# Patient Record
Sex: FEMALE | Race: WHITE | Employment: OTHER | URBAN - METROPOLITAN AREA
[De-identification: names, ages, dates, MRNs, and addresses within clinical notes are randomized per-mention and may not be internally consistent; named-entity substitution may affect disease eponyms.]

---

## 2017-02-08 ENCOUNTER — APPOINTMENT (OUTPATIENT)
Dept: GENERAL RADIOLOGY | Age: 57
DRG: 191 | End: 2017-02-08
Attending: EMERGENCY MEDICINE
Payer: MEDICARE

## 2017-02-08 ENCOUNTER — HOSPITAL ENCOUNTER (INPATIENT)
Age: 57
LOS: 6 days | Discharge: HOME HEALTH | DRG: 191 | End: 2017-02-14
Attending: EMERGENCY MEDICINE | Admitting: FAMILY MEDICINE
Payer: MEDICARE

## 2017-02-08 DIAGNOSIS — J44.1 COPD EXACERBATION (HCC): Primary | ICD-10-CM

## 2017-02-08 DIAGNOSIS — R79.89 ELEVATED LACTIC ACID LEVEL: ICD-10-CM

## 2017-02-08 PROBLEM — J45.901 ACUTE EXACERBATION OF COPD WITH ASTHMA (HCC): Status: ACTIVE | Noted: 2017-02-08

## 2017-02-08 LAB
ALBUMIN SERPL BCP-MCNC: 3.5 G/DL (ref 3.4–5)
ALBUMIN/GLOB SERPL: 0.9 {RATIO} (ref 0.8–1.7)
ALP SERPL-CCNC: 81 U/L (ref 45–117)
ALT SERPL-CCNC: 52 U/L (ref 13–56)
ANION GAP BLD CALC-SCNC: 10 MMOL/L (ref 3–18)
ANION GAP BLD CALC-SCNC: 9 MMOL/L (ref 3–18)
APPEARANCE UR: CLEAR
ARTERIAL PATENCY WRIST A: YES
AST SERPL W P-5'-P-CCNC: 16 U/L (ref 15–37)
ATRIAL RATE: 97 BPM
BASE DEFICIT BLD-SCNC: 2 MMOL/L
BASOPHILS # BLD AUTO: 0 K/UL (ref 0–0.06)
BASOPHILS # BLD: 0 % (ref 0–2)
BDY SITE: ABNORMAL
BILIRUB DIRECT SERPL-MCNC: <0.1 MG/DL (ref 0–0.2)
BILIRUB SERPL-MCNC: 0.6 MG/DL (ref 0.2–1)
BILIRUB UR QL: NEGATIVE
BNP SERPL-MCNC: 41 PG/ML (ref 0–900)
BODY TEMPERATURE: 37
BUN SERPL-MCNC: 11 MG/DL (ref 7–18)
BUN SERPL-MCNC: 11 MG/DL (ref 7–18)
BUN/CREAT SERPL: 11 (ref 12–20)
BUN/CREAT SERPL: 11 (ref 12–20)
CALCIUM SERPL-MCNC: 9.4 MG/DL (ref 8.5–10.1)
CALCIUM SERPL-MCNC: 9.4 MG/DL (ref 8.5–10.1)
CALCULATED P AXIS, ECG09: 25 DEGREES
CALCULATED R AXIS, ECG10: 21 DEGREES
CALCULATED T AXIS, ECG11: 32 DEGREES
CHLORIDE SERPL-SCNC: 100 MMOL/L (ref 100–108)
CHLORIDE SERPL-SCNC: 103 MMOL/L (ref 100–108)
CK MB CFR SERPL CALC: 1.6 % (ref 0–4)
CK MB SERPL-MCNC: 1.2 NG/ML (ref 0.5–3.6)
CK SERPL-CCNC: 73 U/L (ref 26–192)
CO2 SERPL-SCNC: 26 MMOL/L (ref 21–32)
CO2 SERPL-SCNC: 30 MMOL/L (ref 21–32)
COLOR UR: YELLOW
CREAT SERPL-MCNC: 1 MG/DL (ref 0.6–1.3)
CREAT SERPL-MCNC: 1.03 MG/DL (ref 0.6–1.3)
DIAGNOSIS, 93000: NORMAL
DIFFERENTIAL METHOD BLD: ABNORMAL
EOSINOPHIL # BLD: 0.1 K/UL (ref 0–0.4)
EOSINOPHIL NFR BLD: 1 % (ref 0–5)
ERYTHROCYTE [DISTWIDTH] IN BLOOD BY AUTOMATED COUNT: 13.4 % (ref 11.6–14.5)
EST. AVERAGE GLUCOSE BLD GHB EST-MCNC: 183 MG/DL
GAS FLOW.O2 O2 DELIVERY SYS: ABNORMAL L/MIN
GLOBULIN SER CALC-MCNC: 4 G/DL (ref 2–4)
GLUCOSE BLD STRIP.AUTO-MCNC: 239 MG/DL (ref 70–110)
GLUCOSE BLD STRIP.AUTO-MCNC: 284 MG/DL (ref 70–110)
GLUCOSE SERPL-MCNC: 242 MG/DL (ref 74–99)
GLUCOSE SERPL-MCNC: 245 MG/DL (ref 74–99)
GLUCOSE UR STRIP.AUTO-MCNC: >1000 MG/DL
HBA1C MFR BLD: 8 % (ref 4.2–5.6)
HCO3 BLD-SCNC: 23.5 MMOL/L (ref 22–26)
HCT VFR BLD AUTO: 38.7 % (ref 35–45)
HGB BLD-MCNC: 13.3 G/DL (ref 12–16)
HGB UR QL STRIP: NEGATIVE
KETONES UR QL STRIP.AUTO: ABNORMAL MG/DL
LACTATE BLD-SCNC: 3.3 MMOL/L (ref 0.4–2)
LACTATE BLD-SCNC: 3.4 MMOL/L (ref 0.4–2)
LACTATE BLD-SCNC: 4 MMOL/L (ref 0.4–2)
LACTATE BLD-SCNC: 4.2 MMOL/L (ref 0.4–2)
LACTATE BLD-SCNC: 5.1 MMOL/L (ref 0.4–2)
LEUKOCYTE ESTERASE UR QL STRIP.AUTO: NEGATIVE
LYMPHOCYTES # BLD AUTO: 18 % (ref 21–52)
LYMPHOCYTES # BLD: 1.5 K/UL (ref 0.9–3.6)
MCH RBC QN AUTO: 30.6 PG (ref 24–34)
MCHC RBC AUTO-ENTMCNC: 34.4 G/DL (ref 31–37)
MCV RBC AUTO: 89 FL (ref 74–97)
MONOCYTES # BLD: 0.5 K/UL (ref 0.05–1.2)
MONOCYTES NFR BLD AUTO: 6 % (ref 3–10)
NEUTS SEG # BLD: 6.3 K/UL (ref 1.8–8)
NEUTS SEG NFR BLD AUTO: 75 % (ref 40–73)
NITRITE UR QL STRIP.AUTO: NEGATIVE
O2/TOTAL GAS SETTING VFR VENT: 21 %
P-R INTERVAL, ECG05: 148 MS
PCO2 BLD: 40.7 MMHG (ref 35–45)
PH BLD: 7.37 [PH] (ref 7.35–7.45)
PH UR STRIP: 5 [PH] (ref 5–8)
PLATELET # BLD AUTO: 226 K/UL (ref 135–420)
PMV BLD AUTO: 9 FL (ref 9.2–11.8)
PO2 BLD: 66 MMHG (ref 80–100)
POTASSIUM SERPL-SCNC: 3.5 MMOL/L (ref 3.5–5.5)
POTASSIUM SERPL-SCNC: 4 MMOL/L (ref 3.5–5.5)
PROT SERPL-MCNC: 7.5 G/DL (ref 6.4–8.2)
PROT UR STRIP-MCNC: NEGATIVE MG/DL
Q-T INTERVAL, ECG07: 348 MS
QRS DURATION, ECG06: 82 MS
QTC CALCULATION (BEZET), ECG08: 441 MS
RBC # BLD AUTO: 4.35 M/UL (ref 4.2–5.3)
SAO2 % BLD: 92 % (ref 92–97)
SERVICE CMNT-IMP: ABNORMAL
SODIUM SERPL-SCNC: 139 MMOL/L (ref 136–145)
SODIUM SERPL-SCNC: 139 MMOL/L (ref 136–145)
SP GR UR REFRACTOMETRY: 1.03 (ref 1–1.03)
SPECIMEN TYPE: ABNORMAL
TOTAL RESP. RATE, ITRR: 26
TROPONIN I SERPL-MCNC: <0.02 NG/ML (ref 0–0.04)
UROBILINOGEN UR QL STRIP.AUTO: 0.2 EU/DL (ref 0.2–1)
VENTRICULAR RATE, ECG03: 97 BPM
WBC # BLD AUTO: 8.4 K/UL (ref 4.6–13.2)

## 2017-02-08 PROCEDURE — 74011250636 HC RX REV CODE- 250/636: Performed by: EMERGENCY MEDICINE

## 2017-02-08 PROCEDURE — 82803 BLOOD GASES ANY COMBINATION: CPT

## 2017-02-08 PROCEDURE — 36600 WITHDRAWAL OF ARTERIAL BLOOD: CPT

## 2017-02-08 PROCEDURE — 65660000004 HC RM CVT STEPDOWN

## 2017-02-08 PROCEDURE — 83605 ASSAY OF LACTIC ACID: CPT

## 2017-02-08 PROCEDURE — 74011000250 HC RX REV CODE- 250: Performed by: EMERGENCY MEDICINE

## 2017-02-08 PROCEDURE — 85025 COMPLETE CBC W/AUTO DIFF WBC: CPT | Performed by: EMERGENCY MEDICINE

## 2017-02-08 PROCEDURE — 74011636637 HC RX REV CODE- 636/637: Performed by: FAMILY MEDICINE

## 2017-02-08 PROCEDURE — 83880 ASSAY OF NATRIURETIC PEPTIDE: CPT | Performed by: EMERGENCY MEDICINE

## 2017-02-08 PROCEDURE — 82962 GLUCOSE BLOOD TEST: CPT

## 2017-02-08 PROCEDURE — 99285 EMERGENCY DEPT VISIT HI MDM: CPT

## 2017-02-08 PROCEDURE — 96374 THER/PROPH/DIAG INJ IV PUSH: CPT

## 2017-02-08 PROCEDURE — 71010 XR CHEST PORT: CPT

## 2017-02-08 PROCEDURE — 87040 BLOOD CULTURE FOR BACTERIA: CPT | Performed by: EMERGENCY MEDICINE

## 2017-02-08 PROCEDURE — 80076 HEPATIC FUNCTION PANEL: CPT | Performed by: FAMILY MEDICINE

## 2017-02-08 PROCEDURE — 83036 HEMOGLOBIN GLYCOSYLATED A1C: CPT | Performed by: FAMILY MEDICINE

## 2017-02-08 PROCEDURE — 74011250636 HC RX REV CODE- 250/636: Performed by: FAMILY MEDICINE

## 2017-02-08 PROCEDURE — 74011250637 HC RX REV CODE- 250/637: Performed by: FAMILY MEDICINE

## 2017-02-08 PROCEDURE — 96361 HYDRATE IV INFUSION ADD-ON: CPT

## 2017-02-08 PROCEDURE — 94762 N-INVAS EAR/PLS OXIMTRY CONT: CPT

## 2017-02-08 PROCEDURE — 93005 ELECTROCARDIOGRAM TRACING: CPT

## 2017-02-08 PROCEDURE — 96365 THER/PROPH/DIAG IV INF INIT: CPT

## 2017-02-08 PROCEDURE — 80048 BASIC METABOLIC PNL TOTAL CA: CPT | Performed by: EMERGENCY MEDICINE

## 2017-02-08 PROCEDURE — 74011000250 HC RX REV CODE- 250: Performed by: FAMILY MEDICINE

## 2017-02-08 PROCEDURE — 80048 BASIC METABOLIC PNL TOTAL CA: CPT | Performed by: FAMILY MEDICINE

## 2017-02-08 PROCEDURE — 81003 URINALYSIS AUTO W/O SCOPE: CPT | Performed by: EMERGENCY MEDICINE

## 2017-02-08 PROCEDURE — 74011250637 HC RX REV CODE- 250/637: Performed by: EMERGENCY MEDICINE

## 2017-02-08 PROCEDURE — 74011250636 HC RX REV CODE- 250/636

## 2017-02-08 PROCEDURE — 82550 ASSAY OF CK (CPK): CPT | Performed by: EMERGENCY MEDICINE

## 2017-02-08 RX ORDER — BUDESONIDE AND FORMOTEROL FUMARATE DIHYDRATE 80; 4.5 UG/1; UG/1
2 AEROSOL RESPIRATORY (INHALATION)
Status: DISCONTINUED | OUTPATIENT
Start: 2017-02-08 | End: 2017-02-09

## 2017-02-08 RX ORDER — LEVOFLOXACIN 5 MG/ML
750 INJECTION, SOLUTION INTRAVENOUS EVERY 24 HOURS
Status: COMPLETED | OUTPATIENT
Start: 2017-02-09 | End: 2017-02-13

## 2017-02-08 RX ORDER — AZITHROMYCIN 250 MG/1
250 TABLET, FILM COATED ORAL DAILY
Qty: 4 TAB | Refills: 0 | Status: SHIPPED | OUTPATIENT
Start: 2017-02-08 | End: 2017-02-12 | Stop reason: CLARIF

## 2017-02-08 RX ORDER — ASPIRIN 81 MG/1
81 TABLET ORAL DAILY
Status: DISCONTINUED | OUTPATIENT
Start: 2017-02-09 | End: 2017-02-14 | Stop reason: HOSPADM

## 2017-02-08 RX ORDER — SODIUM CHLORIDE 9 MG/ML
1000 INJECTION, SOLUTION INTRAVENOUS ONCE
Status: DISCONTINUED | OUTPATIENT
Start: 2017-02-08 | End: 2017-02-08

## 2017-02-08 RX ORDER — ONDANSETRON 2 MG/ML
INJECTION INTRAMUSCULAR; INTRAVENOUS
Status: COMPLETED
Start: 2017-02-08 | End: 2017-02-08

## 2017-02-08 RX ORDER — LANSOPRAZOLE 30 MG/1
30 CAPSULE, DELAYED RELEASE ORAL 2 TIMES DAILY
Status: ON HOLD | COMMUNITY
End: 2018-01-24

## 2017-02-08 RX ORDER — LISINOPRIL 20 MG/1
40 TABLET ORAL DAILY
Status: DISCONTINUED | OUTPATIENT
Start: 2017-02-09 | End: 2017-02-14 | Stop reason: HOSPADM

## 2017-02-08 RX ORDER — MAGNESIUM SULFATE 100 %
4 CRYSTALS MISCELLANEOUS AS NEEDED
Status: DISCONTINUED | OUTPATIENT
Start: 2017-02-08 | End: 2017-02-14 | Stop reason: HOSPADM

## 2017-02-08 RX ORDER — GUAIFENESIN 600 MG/1
600 TABLET, EXTENDED RELEASE ORAL
Status: ON HOLD | COMMUNITY
End: 2018-03-13

## 2017-02-08 RX ORDER — MAGNESIUM SULFATE HEPTAHYDRATE 500 MG/ML
2 INJECTION, SOLUTION INTRAMUSCULAR; INTRAVENOUS
Status: DISCONTINUED | OUTPATIENT
Start: 2017-02-08 | End: 2017-02-08 | Stop reason: RX

## 2017-02-08 RX ORDER — AZITHROMYCIN 250 MG/1
500 TABLET, FILM COATED ORAL
Status: COMPLETED | OUTPATIENT
Start: 2017-02-08 | End: 2017-02-08

## 2017-02-08 RX ORDER — PREDNISONE 50 MG/1
50 TABLET ORAL DAILY
Qty: 5 TAB | Refills: 0 | Status: SHIPPED | OUTPATIENT
Start: 2017-02-08 | End: 2017-02-13

## 2017-02-08 RX ORDER — ACETAMINOPHEN 325 MG/1
650 TABLET ORAL
Status: DISCONTINUED | OUTPATIENT
Start: 2017-02-08 | End: 2017-02-14 | Stop reason: HOSPADM

## 2017-02-08 RX ORDER — IPRATROPIUM BROMIDE AND ALBUTEROL SULFATE 2.5; .5 MG/3ML; MG/3ML
3 SOLUTION RESPIRATORY (INHALATION)
Status: DISCONTINUED | OUTPATIENT
Start: 2017-02-08 | End: 2017-02-09

## 2017-02-08 RX ORDER — FAMOTIDINE 20 MG/1
20 TABLET, FILM COATED ORAL
COMMUNITY
End: 2020-03-25

## 2017-02-08 RX ORDER — LEVOFLOXACIN 5 MG/ML
750 INJECTION, SOLUTION INTRAVENOUS EVERY 24 HOURS
Status: DISCONTINUED | OUTPATIENT
Start: 2017-02-09 | End: 2017-02-08

## 2017-02-08 RX ORDER — ENOXAPARIN SODIUM 100 MG/ML
40 INJECTION SUBCUTANEOUS EVERY 24 HOURS
Status: DISCONTINUED | OUTPATIENT
Start: 2017-02-08 | End: 2017-02-14 | Stop reason: HOSPADM

## 2017-02-08 RX ORDER — ASPIRIN 81 MG/1
81 TABLET ORAL DAILY
COMMUNITY

## 2017-02-08 RX ORDER — MAGNESIUM SULFATE HEPTAHYDRATE 40 MG/ML
2 INJECTION, SOLUTION INTRAVENOUS
Status: COMPLETED | OUTPATIENT
Start: 2017-02-08 | End: 2017-02-09

## 2017-02-08 RX ORDER — LEVOFLOXACIN 5 MG/ML
750 INJECTION, SOLUTION INTRAVENOUS
Status: COMPLETED | OUTPATIENT
Start: 2017-02-08 | End: 2017-02-09

## 2017-02-08 RX ORDER — TRIAMCINOLONE ACETONIDE 1 MG/G
OINTMENT TOPICAL
Status: ON HOLD | COMMUNITY
End: 2018-01-24

## 2017-02-08 RX ORDER — IPRATROPIUM BROMIDE AND ALBUTEROL SULFATE 2.5; .5 MG/3ML; MG/3ML
3 SOLUTION RESPIRATORY (INHALATION)
Status: COMPLETED | OUTPATIENT
Start: 2017-02-08 | End: 2017-02-08

## 2017-02-08 RX ORDER — SODIUM CHLORIDE 9 MG/ML
1000 INJECTION, SOLUTION INTRAVENOUS ONCE
Status: COMPLETED | OUTPATIENT
Start: 2017-02-08 | End: 2017-02-09

## 2017-02-08 RX ORDER — SODIUM CHLORIDE 9 MG/ML
50 INJECTION, SOLUTION INTRAVENOUS ONCE
Status: COMPLETED | OUTPATIENT
Start: 2017-02-08 | End: 2017-02-09

## 2017-02-08 RX ORDER — PANTOPRAZOLE SODIUM 40 MG/1
40 TABLET, DELAYED RELEASE ORAL
Status: DISCONTINUED | OUTPATIENT
Start: 2017-02-09 | End: 2017-02-14 | Stop reason: HOSPADM

## 2017-02-08 RX ORDER — ONDANSETRON 2 MG/ML
4 INJECTION INTRAMUSCULAR; INTRAVENOUS
Status: COMPLETED | OUTPATIENT
Start: 2017-02-08 | End: 2017-02-08

## 2017-02-08 RX ORDER — INSULIN LISPRO 100 [IU]/ML
INJECTION, SOLUTION INTRAVENOUS; SUBCUTANEOUS EVERY 6 HOURS
Status: DISCONTINUED | OUTPATIENT
Start: 2017-02-09 | End: 2017-02-10

## 2017-02-08 RX ORDER — INSULIN LISPRO 100 [IU]/ML
INJECTION, SOLUTION INTRAVENOUS; SUBCUTANEOUS
Status: DISCONTINUED | OUTPATIENT
Start: 2017-02-08 | End: 2017-02-08

## 2017-02-08 RX ORDER — FAMOTIDINE 20 MG/1
20 TABLET, FILM COATED ORAL
Status: DISCONTINUED | OUTPATIENT
Start: 2017-02-08 | End: 2017-02-08

## 2017-02-08 RX ORDER — MONTELUKAST SODIUM 10 MG/1
10 TABLET ORAL DAILY
Status: DISCONTINUED | OUTPATIENT
Start: 2017-02-09 | End: 2017-02-09

## 2017-02-08 RX ORDER — DEXTROSE 50 % IN WATER (D50W) INTRAVENOUS SYRINGE
25-50 AS NEEDED
Status: DISCONTINUED | OUTPATIENT
Start: 2017-02-08 | End: 2017-02-14 | Stop reason: HOSPADM

## 2017-02-08 RX ADMIN — INSULIN LISPRO 4 UNITS: 100 INJECTION, SOLUTION INTRAVENOUS; SUBCUTANEOUS at 23:42

## 2017-02-08 RX ADMIN — IPRATROPIUM BROMIDE AND ALBUTEROL SULFATE 3 ML: .5; 3 SOLUTION RESPIRATORY (INHALATION) at 20:03

## 2017-02-08 RX ADMIN — MAGNESIUM SULFATE HEPTAHYDRATE 2 G: 40 INJECTION, SOLUTION INTRAVENOUS at 08:03

## 2017-02-08 RX ADMIN — LEVOFLOXACIN 750 MG: 5 INJECTION, SOLUTION INTRAVENOUS at 14:58

## 2017-02-08 RX ADMIN — ONDANSETRON 4 MG: 2 INJECTION INTRAMUSCULAR; INTRAVENOUS at 23:47

## 2017-02-08 RX ADMIN — SODIUM CHLORIDE 50 ML/HR: 900 INJECTION, SOLUTION INTRAVENOUS at 19:57

## 2017-02-08 RX ADMIN — IPRATROPIUM BROMIDE AND ALBUTEROL SULFATE 3 ML: .5; 3 SOLUTION RESPIRATORY (INHALATION) at 23:51

## 2017-02-08 RX ADMIN — SODIUM CHLORIDE 1000 ML: 900 INJECTION, SOLUTION INTRAVENOUS at 10:25

## 2017-02-08 RX ADMIN — SODIUM CHLORIDE 1000 ML: 900 INJECTION, SOLUTION INTRAVENOUS at 12:44

## 2017-02-08 RX ADMIN — METHYLPREDNISOLONE SODIUM SUCCINATE 125 MG: 125 INJECTION, POWDER, FOR SOLUTION INTRAMUSCULAR; INTRAVENOUS at 08:03

## 2017-02-08 RX ADMIN — ACETAMINOPHEN 650 MG: 325 TABLET, FILM COATED ORAL at 20:03

## 2017-02-08 RX ADMIN — AZITHROMYCIN 500 MG: 250 TABLET, FILM COATED ORAL at 10:27

## 2017-02-08 RX ADMIN — ENOXAPARIN SODIUM 40 MG: 100 INJECTION SUBCUTANEOUS at 20:03

## 2017-02-08 RX ADMIN — IPRATROPIUM BROMIDE AND ALBUTEROL SULFATE 3 ML: .5; 3 SOLUTION RESPIRATORY (INHALATION) at 14:58

## 2017-02-08 RX ADMIN — METHYLPREDNISOLONE SODIUM SUCCINATE 40 MG: 125 INJECTION, POWDER, FOR SOLUTION INTRAMUSCULAR; INTRAVENOUS at 23:47

## 2017-02-08 NOTE — IP AVS SNAPSHOT
[address] Patient: Michaela Kaiser MRN: [id number] You are allergic to the following Allergen Reactions Ancef (Cefazolin) Hives Contrast Agent (Iodine) Anaphylaxis Shortness of Breath Swelling Needs pre-medication for IV contrast with Benadryl, Solu-Medrol Fish Containing Products Anaphylaxis Pt states she had a severe allergic reaction at 7 y/o. Metformin Other (comments) Abdominal pain, diarrhea. Codeine Other (comments) Altered mental status Recent Documentation Height Weight BMI OB Status Smoking Status 1.6 m 111.6 kg 43.58 kg/m2 Menopause Former Smoker Emergency Contacts Name Discharge Info Relation Home Work Mobile Franck Lewis CAREGIVER [3] Child [2] [phone] Caryn Curran  Daughter [21] [phone] Sal Colvin  Sister [23] [phone] About your hospitalization You were admitted on:  2017 You last received care in the:  [address] [place] [address] You were discharged on:  2017 Unit phone number:  [phone] Why you were hospitalized Your primary diagnosis was:  Copd Exacerbation (Hcc) Your diagnoses also included:  Acute Exacerbation Of Copd With Asthma (Hcc), Essential Hypertension, Benign, [place] On Cpap, Obesity, Class Iii, Bmi 40-49.9 (Morbid Obesity) (Hcc), Esophageal Reflux, Diastolic Chf, Chronic (Hcc), Diverticulosis Providers Seen During Your Hospitalizations Provider Role Specialty Primary office phone Nae Fernandez DO Attending Provider Emergency Medicine [phone] Becky Oliva MD Attending Provider [facility] [phone] Kemar Andre MD Attending Provider Family Practice [phone] Your Primary Care Physician (PCP) Primary Care Physician Office Phone Office Fax Amalia Vargas [phone] Follow-up Information Follow up With Details Comments Contact Info Ramsey Suarez MD Go on 3/10/2017 Appointment time 245 pm, patient will see Dr. Ana Bella [address] [address] 
[phone] [facility] follow up [address]  
[phone] Cameron Davalos MD Go on 2/17/2017 appointment time 10:00am with Dr. Theresa Keene [address] [place] [address],[address] [address] 
[phone] Current Discharge Medication List  
  
START taking these medications Dose & Instructions Dispensing Information Comments Morning Noon Evening Bedtime * Diabetic Supplies, [place]. Misc Your next dose is: Today, Tomorrow Other:  _________ Diabetic needles 1/2 inch 31 gauge - 1 injection daily Quantity:  30 Each Refills:  5  
     
   
   
   
  
 * Diabetic Supplies, [place]. Misc Your next dose is: Today, Tomorrow Other:  _________ Diabetic syringes 1 mL - use one daily Quantity:  30 Each Refills:  5  
     
   
   
   
  
 * Diabetic Supplies, [place]. Misc Your next dose is: Today, Tomorrow Other:  _________ Diabetic test strips - use three times daily Quantity:  90 Each Refills:  5  
     
   
   
   
  
 * Diabetic Supplies, [place]. Misc Your next dose is: Today, Tomorrow Other:  _________ Diabetic lancets - use three times daily Quantity:  90 Each Refills:  5  
     
   
   
   
  
 glucose 4 gram chewable tablet Your next dose is: Today, Tomorrow Other:  _________ Dose:  4 Tab Take 4 Tabs by mouth as needed for up to 30 days. Quantity:  30 Tab Refills:  0  
     
   
   
   
  
 insulin glargine 100 unit/mL injection Commonly known as:  LANTUS  
   
 Your next dose is: Today, Tomorrow Other:  _________ Dose:  10 Units 10 Units by SubCUTAneous route nightly. Indications: type 2 diabetes mellitus Quantity:  1 Vial  
Refills:  2  
     
   
   
   
  
 insulin lispro 100 unit/mL injection Commonly known as:  HumaLOG Your next dose is: Today, Tomorrow Other:  _________ Check sugars three times a day before meals, if blood sugar is greater than 250 give yourself 8 units of Humalog before eating. Indications: type 2 diabetes mellitus Quantity:  1 Vial  
Refills:  2  
     
   
   
   
  
 * predniSONE 10 mg tablet Commonly known as:  Claribel Pound Your next dose is: Today, Tomorrow Other:  _________ Take 40 mg for 2 days, then take 20 mg for 3 days, and then take 10 mg 3 days Quantity:  17 Tab Refills:  0 SITagliptin 100 mg tablet Commonly known as:  Landy Sine Your next dose is: Today, Tomorrow Other:  _________ Dose:  100 mg Take 1 Tab by mouth daily for 30 days. Indications: type 2 diabetes mellitus Quantity:  30 Tab Refills:  0  
     
   
   
   
  
 * Notice: This list has 5 medication(s) that are the same as other medications prescribed for you. Read the directions carefully, and ask your doctor or other care provider to review them with you. CONTINUE these medications which have NOT CHANGED Dose & Instructions Dispensing Information Comments Morning Noon Evening Bedtime Sean Avila 529-64 mcg/actuation inhaler Generic drug:  fluticasone-salmeterol Your next dose is: Today, Tomorrow Other:  _________ Dose:  2 Puff Take 2 Puffs by inhalation two (2) times a day. Refills:  0  
     
   
   
   
  
 * albuterol 2.5 mg /3 mL (0.083 %) nebulizer solution Commonly known as:  PROVENTIL VENTOLIN Your next dose is: Today, Tomorrow Other:  _________  Dose:  2.5 mg  
 2.5 mg by Nebulization route every four (4) hours as needed for Wheezing. Refills:  0  
     
   
   
   
  
 * albuterol 90 mcg/actuation inhaler Commonly known as:  PROVENTIL HFA, VENTOLIN HFA, PROAIR HFA Your next dose is: Today, Tomorrow Other:  _________ Dose:  2 Puff Take 2 Puffs by inhalation every four (4) hours as needed for Wheezing. For the next 2 days after hospital discharge, use your inhaler 4 times a day. Quantity:  1 Inhaler Refills:  0  
     
   
   
   
  
 aspirin delayed-release 81 mg tablet Your next dose is: Today, Tomorrow Other:  _________ Dose:  81 mg Take 81 mg by mouth daily. Refills:  0  
     
   
   
   
  
 famotidine 20 mg tablet Commonly known as:  PEPCID Your next dose is: Today, Tomorrow Other:  _________ Dose:  20 mg Take 20 mg by mouth nightly. Refills:  0  
     
   
   
   
  
 FLONASE 50 mcg/actuation nasal spray Generic drug:  fluticasone Your next dose is: Today, Tomorrow Other:  _________ Dose:  2 Spray 2 Sprays by Both Nostrils route daily. Refills:  0  
     
   
   
   
  
 inhalational spacing device Commonly known as:  AEROCHAMBER Your next dose is: Today, Tomorrow Other:  _________ Dose:  1 Each  
1 Each by Does Not Apply route as needed for Cough or Other (COPD exacerbation). Quantity:  1 Device Refills:  0  
     
   
   
   
  
 lansoprazole 30 mg capsule Commonly known as:  PREVACID Your next dose is: Today, Tomorrow Other:  _________ Dose:  30 mg Take 30 mg by mouth Daily (before breakfast). Refills:  0  
     
   
   
   
  
 lisinopril 10 mg tablet Commonly known as:  Mollie Ripa Your next dose is: Today, Tomorrow Other:  _________ Dose:  40 mg Take 40 mg by mouth daily. Refills:  0 MUCINEX 600 mg ER tablet Generic drug:  guaiFENesin ER Your next dose is: Today, Tomorrow Other:  _________ Dose:  600 mg Take 600 mg by mouth two (2) times daily as needed for Congestion. Refills:  0 SINGULAIR 10 mg tablet Generic drug:  montelukast  
   
Your next dose is: Today, Tomorrow Other:  _________ Dose:  10 mg Take 10 mg by mouth daily. Refills:  0 SPIRIVA WITH HANDIHALER 18 mcg inhalation capsule Generic drug:  tiotropium Your next dose is: Today, Tomorrow Other:  _________ Dose:  1 Cap Take 1 Cap by inhalation daily. Refills:  0  
     
   
   
   
  
 triamcinolone acetonide 0.1 % ointment Commonly known as:  KENALOG Your next dose is: Today, Tomorrow Other:  _________ Apply  to affected area two (2) times daily as needed for Skin Irritation. use thin layer Refills:  0  
     
   
   
   
  
 * Notice: This list has 2 medication(s) that are the same as other medications prescribed for you. Read the directions carefully, and ask your doctor or other care provider to review them with you. STOP taking these medications   
 metFORMIN 500 mg tablet Commonly known as:  GLUCOPHAGE ASK your doctor about these medications Dose & Instructions Dispensing Information Comments Morning Noon Evening Bedtime * predniSONE 50 mg tablet Commonly known as:  Richard Matthewy Ask about: Should I take this medication? Your next dose is: Today, Tomorrow Other:  _________ Dose:  50 mg Take 1 Tab by mouth daily for 5 days. Quantity:  5 Tab Refills:  0  
     
   
   
   
  
 * Notice: This list has 1 medication(s) that are the same as other medications prescribed for you. Read the directions carefully, and ask your doctor or other care provider to review them with you. Where to Get Your Medications Information on where to get these meds will be given to you by the nurse or doctor. ! Ask your nurse or doctor about these medications Diabetic Supplies, [address] Misc Diabetic Supplies, [address] Misc Diabetic Supplies, [address] Misc Diabetic Supplies, [address] Misc  
 glucose 4 gram chewable tablet  
 insulin glargine 100 unit/mL injection  
 insulin lispro 100 unit/mL injection  
 predniSONE 10 mg tablet  
 predniSONE 50 mg tablet SITagliptin 100 mg tablet Discharge Instructions DISCHARGE SUMMARY from Nurse The following personal items are in your possession at time of discharge: 
 
Dental Appliances: None Visual Aid: None Home Medications: Sent to pharmacy ([facility]) Jewelry: None Clothing: Pants Other Valuables: None PATIENT INSTRUCTIONS: 
 
 
F-face looks uneven A-arms unable to move or move unevenly S-speech slurred or non-existent T-time-call 911 as soon as signs and symptoms begin-DO NOT go Back to bed or wait to see if you get better-TIME IS BRAIN. Warning Signs of HEART ATTACK Call 911 if you have these symptoms: 
? Chest discomfort. Most heart attacks involve discomfort in the center of the chest that lasts more than a few minutes, or that goes away and comes back. It can feel like uncomfortable pressure, squeezing, fullness, or pain. ? Discomfort in other areas of the upper body. Symptoms can include pain or discomfort in one or both arms, the back, neck, jaw, or stomach. ? Shortness of breath with or without chest discomfort. ? Other signs may include breaking out in a cold sweat, nausea, or lightheadedness. Don't wait more than five minutes to call [address]! Fast action can save your life. Calling 911 is almost always the fastest way to get lifesaving treatment. Emergency Medical Services staff can begin treatment when they arrive  up to an hour sooner than if someone gets to the hospital by car. The discharge information has been reviewed with the patient. The patient verbalized understanding. Discharge medications reviewed with the patient and appropriate educational materials and side effects teaching were provided. MyChart Activation Thank you for requesting access to [employer]. Please follow the instructions below to securely access and download your online medical record. [employer] allows you to send messages to your doctor, view your test results, renew your prescriptions, schedule appointments, and more. How Do I Sign Up? 1. In your internet browser, go to [email or web address]. 2. Click on the First Time User? Click Here link in the Sign In box. You will see the New Member Sign Up page. 3. Enter your [employer] Access Code exactly as it appears below. You will not need to use this code after youve completed the sign-up process. If you do not sign up before the expiration date, you must request a new code. [employer] Access Code: [facility] Expires: 2017  7:31 AM (This is the date your [employer] access code will ) 4. Enter the last four digits of your Social Security Number (xxxx) and Date of Birth (mm/dd/yyyy) as indicated and click Submit. You will be taken to the next sign-up page. 5. Create a I[employer] ID. This will be your [employer] login ID and cannot be changed, so think of one that is secure and easy to remember. 6. Create a [employer] password. You can change your password at any time. 7. Enter your Password Reset Question and Answer. This can be used at a later time if you forget your password. 8. Enter your e-mail address. You will receive e-mail notification when new information is available in [address] 9. Click Sign Up. You can now view and download portions of your medical record. 10. Click the Download Summary menu link to download a portable copy of your medical information. Additional Information If you have questions, please visit the Frequently Asked Questions section of the [employer] website at [email or web address]/. Remember, [employer] is NOT to be used for urgent needs. For medical emergencies, dial 911. Patient armband removed and shredded Discharge Orders None Introducing [facility] SERVICES! Kaylee Hurd introduces [employer] patient portal. Now you can access parts of your medical record, email your doctor's office, and request medication refills online. 1. In your internet browser, go to [email or web address] 2. Click on the First Time User? Click Here link in the Sign In box. You will see the New Member Sign Up page. 3. Enter your [employer] Access Code exactly as it appears below. You will not need to use this code after youve completed the sign-up process. If you do not sign up before the expiration date, you must request a new code. · [employer] Access Code: [facility] Expires: 5/9/2017  7:31 AM 
 
4. Enter the last four digits of your Social Security Number (xxxx) and Date of Birth (mm/dd/yyyy) as indicated and click Submit. You will be taken to the next sign-up page. 5. Create a [employer] ID. This will be your [employer] login ID and cannot be changed, so think of one that is secure and easy to remember. 6. Create a [employer] password. You can change your password at any time. 7. Enter your Password Reset Question and Answer. This can be used at a later time if you forget your password. 8. Enter your e-mail address. You will receive e-mail notification when new information is available in [address] 9. Click Sign Up. You can now view and download portions of your medical record. 10. Click the Download Summary menu link to download a portable copy of your medical information. If you have questions, please visit the Frequently Asked Questions section of the [email or web address] website. Remember, [email or web address] is NOT to be used for urgent needs. For medical emergencies, dial 911. Now available from your iPhone and Android! General Information Please provide this summary of care documentation to your next provider. Patient Signature:  ____________________________________________________________ Date:  ____________________________________________________________  
  
[facility] Provider Signature:  ____________________________________________________________ Date:  ____________________________________________________________

## 2017-02-08 NOTE — ED PROVIDER NOTES
HPI Comments: 7:12 AM Michaela Kaiser is a 61 y.o. female with a history of HTN, COPD, DM,  who presents to the emergency department via EMS c/o exacerbated difficulty breathing onset last night. The patient explains that last night she started to feel a chest tightness with productive cough last night. She states that she put her CPAP machine on in attempt to help with her breathing but states this morning her symptoms became exacerbated. She mentions that she was recently treated for diverticulitis and was put on Flagyl and Cipro. EMS administered 1 duo-neb and 1 albuterol with noted improvement of symptoms. Pt is currently not on O2 at home. Pt also c/o trace edema in hands and feet. Last course of prednisone about 1 month ago. Pt denies fever, nausea, vomiting or any other symptoms at this time. No history of blood clots or MI. No other concerns at this time. PCP: Ricki Shook MD              The history is provided by the patient and the EMS personnel.         Past Medical History:   Diagnosis Date    Asthma     COPD     Cystocele, midline     Diabetes mellitus ([facility] [address]     GERD (gastroesophageal reflux disease)     Hidradenitis suppurativa     Hyperlipidemia     Hypertension     SHERRIE on CPAP     Stress incontinence        Past Surgical History:   Procedure Laterality Date    Hx cholecystectomy      Hx appendectomy      Pr breast surgery procedure unlisted       Right breast benign tumor removal    Hx dilation and curettage       Dysfunctional uterine bleeding, thought 2/2 fibroids    Hx tubal ligation           Family History:   Problem Relation Age of Onset    Hypertension Mother     Stroke Mother     Breast Cancer Mother      Bilateral mastectomies    Cancer Mother      ovarian and breast    Heart Failure Mother     Heart Attack Father      2011    Heart Surgery Father      CABG    Heart Failure Father     COPD Sister      Heavy smoker    Cancer Sister      ovarian  Heart Failure Sister     Lung Disease Sister     Asthma Child     Cancer Maternal Aunt      pancreatic    Cancer Maternal Grandfather      stomach       Social History     Social History    Marital status: LEGALLY      Spouse name: N/A    Number of children: N/A    Years of education: N/A     Occupational History    Not on file. Social History Main Topics    Smoking status: Former Smoker     Packs/day: 1.00     Years: 30.00     Types: Cigarettes     Quit date: 9/6/2006    Smokeless tobacco: Never Used      Comment: 1-1.5 packs per day    Alcohol use No    Drug use: No    Sexual activity: No     Other Topics Concern    Not on file     Social History Narrative         ALLERGIES: Ancef [cefazolin]; Contrast agent [iodine]; Metformin; and Codeine    Review of Systems   Constitutional: Negative for chills and fever. HENT: Negative for congestion and sneezing. Eyes: Negative for visual disturbance. Respiratory: Positive for chest tightness and shortness of breath. Negative for cough. Cardiovascular: Negative for chest pain. Gastrointestinal: Negative for abdominal pain, nausea and vomiting. Genitourinary: Negative for difficulty urinating and dysuria. Musculoskeletal: Negative for back pain. (+) Swelling of hands and feet   Skin: Negative for rash. Neurological: Negative for weakness and headaches. All other systems reviewed and are negative. Vitals:    02/08/17 0804 02/08/17 0830 02/08/17 0900 02/08/17 0930   BP: 136/72 131/59 137/60 124/55   Pulse: (!) 102      Resp: 16      Temp: 98.1 °F (36.7 °C)      SpO2: 99% 93% 90% 92%            Physical Exam   Constitutional: She is oriented to person, place, and time. She appears well-developed and well-nourished. HENT:   Head: Normocephalic and atraumatic. Neck: Neck supple. No JVD present. Cardiovascular: Normal rate and regular rhythm. Pulmonary/Chest: Tachypnea noted. No respiratory distress.  She has no wheezes. Diminished L base   Abdominal: Soft. She exhibits no distension. There is no tenderness. There is no rebound and no guarding. Musculoskeletal: She exhibits no edema. No calf tenderness   Neurological: She is alert and oriented to person, place, and time. Skin: Skin is warm and dry. No erythema. Psychiatric: Judgment normal.        MDM  Number of Diagnoses or Management Options  Diagnosis management comments: 60 y/o female presents with sob, hx of copd  No PE risk factors,   Initial wheezing, has improved with nebs  Noted tachypnea, will give solumedrol and mag  Check lactate, cxr to eval for pna  Screen for chf. Amount and/or Complexity of Data Reviewed  Clinical lab tests: ordered and reviewed  Tests in the radiology section of CPT®: ordered and reviewed  Tests in the medicine section of CPT®: reviewed and ordered      ED Course       Procedures  Vitals:  Patient Vitals for the past 12 hrs:   Temp Pulse Resp BP SpO2   02/08/17 0930 - - - 124/55 92 %   02/08/17 0900 - - - 137/60 90 %   02/08/17 0830 - - - 131/59 93 %   02/08/17 0804 98.1 °F (36.7 °C) (!) 102 16 136/72 99 %   02/08/17 0800 - - - 145/61 90 %   02/08/17 0730 - - - 152/72 93 %   99 %. Percentage is within normal limits.        Medications ordered:   Medications   albuterol-ipratropium (DUO-NEB) 2.5 MG-0.5 MG/3 ML (not administered)   levoFLOXacin (LEVAQUIN) 750 mg in D5W IVPB (not administered)   methylPREDNISolone (PF) (SOLU-MEDROL) injection 125 mg (125 mg IntraVENous Given 2/8/17 0803)   magnesium sulfate 2 g/50 ml IVPB (premix or compounded) (2 g IntraVENous New Bag 2/8/17 0803)   0.9% sodium chloride infusion 1,000 mL (1,000 mL IntraVENous New Bag 2/8/17 1025)   azithromycin (ZITHROMAX) tablet 500 mg (500 mg Oral Given 2/8/17 1027)   0.9% sodium chloride infusion 1,000 mL (1,000 mL IntraVENous New Bag 2/8/17 1244)         Lab findings:  Recent Results (from the past 12 hour(s))   CBC WITH AUTOMATED DIFF    Collection Time: 02/08/17  7:45 AM   Result Value Ref Range    WBC 8.4 4.6 - 13.2 K/uL    RBC 4.35 4.20 - 5.30 M/uL    HGB 13.3 12.0 - 16.0 g/dL    HCT 38.7 35.0 - 45.0 %    MCV 89.0 74.0 - 97.0 FL    MCH 30.6 24.0 - 34.0 PG    MCHC 34.4 31.0 - 37.0 g/dL    RDW 13.4 11.6 - 14.5 %    PLATELET 170 844 - 651 K/uL    MPV 9.0 (L) 9.2 - 11.8 FL    NEUTROPHILS 75 (H) 40 - 73 %    LYMPHOCYTES 18 (L) 21 - 52 %    MONOCYTES 6 3 - 10 %    EOSINOPHILS 1 0 - 5 %    BASOPHILS 0 0 - 2 %    ABS. NEUTROPHILS 6.3 1.8 - 8.0 K/UL    ABS. LYMPHOCYTES 1.5 0.9 - 3.6 K/UL    ABS. MONOCYTES 0.5 0.05 - 1.2 K/UL    ABS. EOSINOPHILS 0.1 0.0 - 0.4 K/UL    ABS.  BASOPHILS 0.0 0.0 - 0.06 K/UL    DF AUTOMATED     METABOLIC PANEL, BASIC    Collection Time: 02/08/17  7:45 AM   Result Value Ref Range    Sodium 139 136 - 145 mmol/L    Potassium 3.5 3.5 - 5.5 mmol/L    Chloride 100 100 - 108 mmol/L    CO2 30 21 - 32 mmol/L    Anion gap 9 3.0 - 18 mmol/L    Glucose 245 (H) 74 - 99 mg/dL    BUN 11 7.0 - 18 MG/DL    Creatinine 1.03 0.6 - 1.3 MG/DL    BUN/Creatinine ratio 11 (L) 12 - 20      GFR est AA >60 >60 ml/min/1.73m2    GFR est non-AA 55 (L) >60 ml/min/1.73m2    Calcium 9.4 8.5 - 10.1 MG/DL   CARDIAC PANEL,(CK, CKMB & TROPONIN)    Collection Time: 02/08/17  7:45 AM   Result Value Ref Range    CK 73 26 - 192 U/L    CK - MB 1.2 0.5 - 3.6 ng/ml    CK-MB Index 1.6 0.0 - 4.0 %    Troponin-I, Qt. <0.02 0.0 - 0.045 NG/ML   PRO-BNP    Collection Time: 02/08/17  7:45 AM   Result Value Ref Range    NT pro-BNP 41 0 - 900 PG/ML   POC LACTIC ACID    Collection Time: 02/08/17  7:57 AM   Result Value Ref Range    Lactic Acid (POC) 3.4 (HH) 0.4 - 2.0 mmol/L   EKG, 12 LEAD, INITIAL    Collection Time: 02/08/17  8:08 AM   Result Value Ref Range    Ventricular Rate 97 BPM    Atrial Rate 97 BPM    P-R Interval 148 ms    QRS Duration 82 ms    Q-T Interval 348 ms    QTC Calculation (Bezet) 441 ms    Calculated P Axis 25 degrees    Calculated R Axis 21 degrees    Calculated T Axis 32 degrees    Diagnosis       Normal sinus rhythm  Normal ECG  When compared with ECG of 18-SEP-2016 14:10,  Nonspecific T wave abnormality, worse in Lateral leads     POC LACTIC ACID    Collection Time: 02/08/17 12:15 PM   Result Value Ref Range    Lactic Acid (POC) 4.0 (HH) 0.4 - 2.0 mmol/L   POC LACTIC ACID    Collection Time: 02/08/17  1:53 PM   Result Value Ref Range    Lactic Acid (POC) 4.2 (HH) 0.4 - 2.0 mmol/L     Noted elevated lactate, will repeat after IVF    [facility] with continued rising lactate[facility]. EKG interpretation by ED Physician:   808, rate 97, normal axis, NSR, normal intervals, no ST changes    X-Ray, CT or other radiology findings or impressions:  XR CHEST PORT       Left plural effusion with cardiomegaly. No acute process. Interpreted by Dr. Gustavo Solorio           Progress notes, Consult notes or additional Procedure notes:  2:30 PM. Consult with Dr. Gerlene Boeck, [facility] Attending, he will evaluate patient. 2:50 PM. Consult with Dr. Gerlene Boeck, he has evaluated the patient and accepts for admission. Disposition:  Diagnosis:   1. COPD exacerbation ([address]    2. Elevated lactic acid level        Disposition: admitted      Scribe Attestation:     I, [address] [place] [address] for and in the presence of Florence Shen DO February 08, 2017 at 2:57 PM     Physician Attestation:   I personally performed the services described in this documentation, reviewed and edited the documentation which was dictated to the scribe in my presence, and it accurately records my words and actions.  Florence Shen DO  February 08, 2017   Signed by: Katrin Das February 08, 2017, 2:57 PM

## 2017-02-08 NOTE — H&P
Admission History and Physical  [facility]      Patient: Alka Mehta MRN: [id number]  [facility]: [id number]    YOB: 1960  Age: 61 y.o. Sex: female      Admission Date: 2/8/2017       HPI:     Alka Mehta is a 61 y.o. female with history of COPD/Asthma, DMII, HTN, and GERD presenting with shortness of breath. Patient reports that her symptoms started acutely last night. She tried several albuterol nebulizer treatments, but these did not alleviate her symptoms. Patient reports she also developed cough productive of greenish sputum. She denies any fevers or chills. She denies any sick contacts. Due to worsening symptoms, she called EMS and was brought to [facility] for medical attention. En route, she received 3 albuterol treatments. Since arrival in the ED, she has received 1 albuterol treatment and 1 duoneb treatment. She was also given solumedrol. Patient continued to have increased work of breathing and her lactic acidosis worsened despite 2L of IV fluids. [facility] for further management.      ED Course:   Levaquin, Azithromycin  2 breathing treatments      Review of Systems  General ROS: negative for  - chills, fever  Psychological ROS: negative for - anxiety and depression  Ophthalmic ROS: negative for - vision changes  ENT ROS: negative for - headaches  Hematological and Lymphatic ROS: negative for - bruising, jaundice  Respiratory ROS: positive for - cough, shortness of breath, wheezing; negative for - hemoptysis  Cardiovascular ROS: negative for - chest pain, palpitations   Gastrointestinal ROS: negative for - abdominal pain, constipation, diarrhea, hematemesis, melena, nausea/vomiting or swallowing difficulty/pain  Genito-Urinary ROS: negative for - dysuria, hematuria   Musculoskeletal ROS: negative for - joint pain, muscle pain  Neurological ROS: negative for - dizziness, numbness/tingling or weakness  Dermatological ROS: negative for - rash or skin lesion changes      Past Medical History   Diagnosis Date    Asthma     COPD     Cystocele, midline     Diabetes mellitus ([facility] [address]     GERD (gastroesophageal reflux disease)     Hidradenitis suppurativa     Hyperlipidemia     Hypertension     SHERRIE on CPAP     Stress incontinence        Past Surgical History   Procedure Laterality Date    Hx cholecystectomy      Hx appendectomy      Pr breast surgery procedure unlisted       Right breast benign tumor removal    Hx dilation and curettage       Dysfunctional uterine bleeding, thought 2/2 fibroids    Hx tubal ligation         Family History   Problem Relation Age of Onset    Hypertension Mother     Stroke Mother     Breast Cancer Mother      Bilateral mastectomies    Cancer Mother      ovarian and breast    Heart Failure Mother     Heart Attack Father      2011    Heart Surgery Father      CABG    Heart Failure Father     COPD Sister      Heavy smoker    Cancer Sister      ovarian    Heart Failure Sister     Lung Disease Sister     Asthma Child     Cancer Maternal Aunt      pancreatic    Cancer Maternal Grandfather      stomach       Social History     Social History    Marital status: LEGALLY      Spouse name: N/A    Number of children: N/A    Years of education: N/A     Social History Main Topics    Smoking status: Former Smoker     Packs/day: 1.00     Years: 30.00     Types: Cigarettes     Quit date: 9/6/2006    Smokeless tobacco: Never Used      Comment: 1-1.5 packs per day    Alcohol use No    Drug use: No    Sexual activity: No     Other Topics Concern    Not on file     Social History Narrative       Allergies   Allergen Reactions    Ancef [Cefazolin] Hives    Contrast Agent [Iodine] Anaphylaxis, Shortness of Breath and Swelling     Needs pre-medication for IV contrast with Benadryl, Solu-Medrol    Metformin Other (comments)     Abdominal pain, diarrhea.     Codeine Other (comments)     Altered mental status     Prior to Admission Medications   Prescriptions Last Dose Informant Patient Reported? Taking? Inhalational Spacing Device (AEROCHAMBER) 2017 at Unknown time  No Yes   Si Each by Does Not Apply route as needed for Cough or Other (COPD exacerbation). albuterol (PROVENTIL HFA, VENTOLIN HFA, PROAIR HFA) 90 mcg/actuation inhaler 2017 at Unknown time  No Yes   Sig: Take 2 Puffs by inhalation every four (4) hours as needed for Wheezing. For the next 2 days after hospital discharge, use your inhaler 4 times a day. albuterol (PROVENTIL VENTOLIN) 2.5 mg /3 mL (0.083 %) nebulizer solution 2017 at Unknown time  Yes Yes   Si.5 mg by Nebulization route every four (4) hours as needed for Wheezing. aspirin delayed-release 81 mg tablet 2017 at Unknown time  Yes Yes   Sig: Take 81 mg by mouth daily. famotidine (PEPCID) 20 mg tablet 2017 at Unknown time  Yes Yes   Sig: Take 20 mg by mouth nightly. fluticasone (FLONASE) 50 mcg/actuation nasal spray 2017 at Unknown time  Yes Yes   Si Sprays by Both Nostrils route daily. fluticasone-salmeterol (ADVAIR HFA) 115-21 mcg/actuation inhaler 2017 at Unknown time  Yes Yes   Sig: Take 2 Puffs by inhalation two (2) times a day. guaiFENesin ER (MUCINEX) 600 mg ER tablet Unknown at Unknown time  Yes No   Sig: Take 600 mg by mouth two (2) times daily as needed for Congestion. lansoprazole (PREVACID) 30 mg capsule 2017 at Unknown time  Yes Yes   Sig: Take 30 mg by mouth Daily (before breakfast). lisinopril (PRINIVIL, ZESTRIL) 10 mg tablet 2017 at Unknown time  Yes Yes   Sig: Take 40 mg by mouth daily. metFORMIN (GLUCOPHAGE) 500 mg tablet 2017 at Unknown time  Yes Yes   Sig: Take  by mouth two (2) times daily (with meals). montelukast (SINGULAIR) 10 mg tablet 2017 at Unknown time  Yes Yes   Sig: Take 10 mg by mouth daily.      tiotropium (SPIRIVA WITH HANDIHALER) 18 mcg inhalation capsule 2/8/2017 at Unknown time  Yes Yes   Sig: Take 1 Cap by inhalation daily. triamcinolone acetonide (KENALOG) 0.1 % ointment Unknown at Unknown time  Yes No   Sig: Apply  to affected area two (2) times daily as needed for Skin Irritation. use thin layer      Facility-Administered Medications: None       Physical Exam:     Patient Vitals for the past 24 hrs:   Temp Pulse Resp BP SpO2   02/08/17 0930 - - - 124/55 92 %   02/08/17 0900 - - - 137/60 90 %   02/08/17 0830 - - - 131/59 93 %   02/08/17 0804 98.1 °F (36.7 °C) (!) 102 16 136/72 99 %   02/08/17 0800 - - - 145/61 90 %   02/08/17 0730 - - - 152/72 93 %       Physical Exam:  General:  Alert and Responsive and in No acute distress. HEENT: Conjunctiva pink, sclera anicteric. EOMI. Pharynx moist, nonerythematous. Moist mucous membranes. No other gross abnormalities present. CV:  RRR, no murmurs/rubs/gallops. No visible pulsations or thrills. RESP: Labored breathing, using accessory muscles, but not in distress and maintaining O2 saturation. No wheezes/rales/rhonchi, but minimal air movement. Equal expansion bilaterally. ABD:  Soft, nondistended. Minimal tenderness at site of abdominal hernia. RECTAL:  Deferred  MSK:  No joint deformity or instability. No atrophy. Neuro:  5/5 strength bilateral upper extremities and lower extremities. A+Ox3. Ext:  Trace edema. 2+ radial and dp pulses bilaterally. Skin:  No rashes, lesions, or ulcers. Good turgor.     IMAGING:     Recent Results (from the past 12 hour(s))   CBC WITH AUTOMATED DIFF    Collection Time: 02/08/17  7:45 AM   Result Value Ref Range    WBC 8.4 4.6 - 13.2 K/uL    RBC 4.35 4.20 - 5.30 M/uL    HGB 13.3 12.0 - 16.0 g/dL    HCT 38.7 35.0 - 45.0 %    MCV 89.0 74.0 - 97.0 FL    MCH 30.6 24.0 - 34.0 PG    MCHC 34.4 31.0 - 37.0 g/dL    RDW 13.4 11.6 - 14.5 %    PLATELET 086 135 - 589 K/uL    MPV 9.0 (L) 9.2 - 11.8 FL    NEUTROPHILS 75 (H) 40 - 73 %    LYMPHOCYTES 18 (L) 21 - 52 %    MONOCYTES 6 3 - 10 %    EOSINOPHILS 1 0 - 5 %    BASOPHILS 0 0 - 2 %    ABS. NEUTROPHILS 6.3 1.8 - 8.0 K/UL    ABS. LYMPHOCYTES 1.5 0.9 - 3.6 K/UL    ABS. MONOCYTES 0.5 0.05 - 1.2 K/UL    ABS. EOSINOPHILS 0.1 0.0 - 0.4 K/UL    ABS.  BASOPHILS 0.0 0.0 - 0.06 K/UL    DF AUTOMATED     METABOLIC PANEL, BASIC    Collection Time: 02/08/17  7:45 AM   Result Value Ref Range    Sodium 139 136 - 145 mmol/L    Potassium 3.5 3.5 - 5.5 mmol/L    Chloride 100 100 - 108 mmol/L    CO2 30 21 - 32 mmol/L    Anion gap 9 3.0 - 18 mmol/L    Glucose 245 (H) 74 - 99 mg/dL    BUN 11 7.0 - 18 MG/DL    Creatinine 1.03 0.6 - 1.3 MG/DL    BUN/Creatinine ratio 11 (L) 12 - 20      GFR est AA >60 >60 ml/min/1.73m2    GFR est non-AA 55 (L) >60 ml/min/1.73m2    Calcium 9.4 8.5 - 10.1 MG/DL   CARDIAC PANEL,(CK, CKMB & TROPONIN)    Collection Time: 02/08/17  7:45 AM   Result Value Ref Range    CK 73 26 - 192 U/L    CK - MB 1.2 0.5 - 3.6 ng/ml    CK-MB Index 1.6 0.0 - 4.0 %    Troponin-I, Qt. <0.02 0.0 - 0.045 NG/ML   PRO-BNP    Collection Time: 02/08/17  7:45 AM   Result Value Ref Range    NT pro-BNP 41 0 - 900 PG/ML   POC LACTIC ACID    Collection Time: 02/08/17  7:57 AM   Result Value Ref Range    Lactic Acid (POC) 3.4 (HH) 0.4 - 2.0 mmol/L   EKG, 12 LEAD, INITIAL    Collection Time: 02/08/17  8:08 AM   Result Value Ref Range    Ventricular Rate 97 BPM    Atrial Rate 97 BPM    P-R Interval 148 ms    QRS Duration 82 ms    Q-T Interval 348 ms    QTC Calculation (Bezet) 441 ms    Calculated P Axis 25 degrees    Calculated R Axis 21 degrees    Calculated T Axis 32 degrees    Diagnosis       Normal sinus rhythm  Normal ECG  When compared with ECG of 18-SEP-2016 14:10,  No significant change was found    Confirmed by Lidia Lind ([id number]) on 2/8/2017 4:14:59 PM     POC LACTIC ACID    Collection Time: 02/08/17 12:15 PM   Result Value Ref Range    Lactic Acid (POC) 4.0 (HH) 0.4 - 2.0 mmol/L   POC LACTIC ACID    Collection Time: 02/08/17  1:53 PM Result Value Ref Range    Lactic Acid (POC) 4.2 (HH) 0.4 - 2.0 mmol/L   URINALYSIS W/ RFLX MICROSCOPIC    Collection Time: 02/08/17  3:30 PM   Result Value Ref Range    Color YELLOW      Appearance CLEAR      Specific gravity 1.026 1.005 - 1.030      pH (UA) 5.0 5.0 - 8.0      Protein NEGATIVE  NEG mg/dL    Glucose >1000 (A) NEG mg/dL    Ketone TRACE (A) NEG mg/dL    Bilirubin NEGATIVE  NEG      Blood NEGATIVE  NEG      Urobilinogen 0.2 0.2 - 1.0 EU/dL    Nitrites NEGATIVE  NEG      Leukocyte Esterase NEGATIVE  NEG       CXR Results  (Last 48 hours)               02/08/17 0802  XR CHEST PORT Final result    Impression:  Impression:       1. Stable bilateral lower lung zone densities. 2.  No new infiltrate or consolidation. Narrative:  Procedure:  Chest portable. Indication:  Shortness of breath. Comparison:  9/18/16, 6/3/15. Findings: An obliquely oriented bandlike density is identified in the right   lung base. A similar density was noted on 9/18/2016 and 6/3/2015. A focus of   hazy density with circumscribed margins is identified in the left lower lung   zone adjacent to the cardiac apex. This density is found to be related to a   pericardial fat pad on 6/3/15. No new infiltrate or consolidation is   identified. The cardiac silhouette is mildly prominent. Assessment/Plan:   61 y.o. female with history of COPD/Asthma, DMII, HTN, and GERD admitted with acute COPD exacerbation. Patient has been treatment with solumedrol and albuterol, but still has increased work of breathing. Additionally, she has lactic acidosis, which is not resolving with fluid resuscitation.       Acute COPD exacerbation:  - Continue solumedrol 40 mg q8h  - Duonebs q4h; will increase frequency if necessary  - Levaquin 750 mg daily  - Monitor lactic acidosis  - Supplemental oxygen as needed  - Substituting formulary inhaler for Advair    DMII:  - Holding metformin  - [place] SSI    HTN:  - Continue lisinopril 40 mg daily    GERD:  - Continue famotidine and omeprazole (substituted for lansoprazole)      Diet: Diabtetic  DVT Prophylaxis: Lovenox, SCDs  Code Status: FULL  Point of Contact: Gilles Garcia, Daughter, [phone]    Disposition and anticipated LOS: 2+ midnights        April Madrid MD  PGY2, [facility]

## 2017-02-08 NOTE — ED NOTES
TRANSFER - OUT REPORT:    Verbal report given to Desean Menjivar RN (name) on Nishi Lombardi  being transferred to (unit) for routine progression of care       Report consisted of patients Situation, Background, Assessment and   Recommendations(SBAR). Information from the following report(s) ED Summary, Intake/Output and MAR was reviewed with the receiving nurse. Lines:   Peripheral IV 02/08/17 Left Arm (Active)   Site Assessment Clean, dry, & intact 2/8/2017  4:19 PM   Phlebitis Assessment 0 2/8/2017  4:19 PM   Infiltration Assessment 0 2/8/2017  4:19 PM   Dressing Status Clean, dry, & intact 2/8/2017  4:19 PM   Dressing Type Tape;Transparent 2/8/2017  4:19 PM   Hub Color/Line Status Pink;Flushed;Patent 2/8/2017  4:19 PM   Action Taken Blood drawn 2/8/2017  4:19 PM        Opportunity for questions and clarification was provided.       Patient transported with:   Monitor  Registered Nurse

## 2017-02-08 NOTE — IP AVS SNAPSHOT
Current Discharge Medication List  
  
Take these medications at their scheduled times Dose & Instructions Dispensing Information Comments Morning Noon Evening Bedtime Sean Avila 190-59 mcg/actuation inhaler Generic drug:  fluticasone-salmeterol Your next dose is: Today, Tomorrow Other:  ____________ Dose:  2 Puff Take 2 Puffs by inhalation two (2) times a day. Refills:  0  
     
   
   
   
  
 aspirin delayed-release 81 mg tablet Your next dose is: Today, Tomorrow Other:  ____________ Dose:  81 mg Take 81 mg by mouth daily. Refills:  0  
     
   
   
   
  
 famotidine 20 mg tablet Commonly known as:  PEPCID Your next dose is: Today, Tomorrow Other:  ____________ Dose:  20 mg Take 20 mg by mouth nightly. Refills:  0  
     
   
   
   
  
 FLONASE 50 mcg/actuation nasal spray Generic drug:  fluticasone Your next dose is: Today, Tomorrow Other:  ____________ Dose:  2 Spray 2 Sprays by Both Nostrils route daily. Refills:  0  
     
   
   
   
  
 insulin glargine 100 unit/mL injection Commonly known as:  LANTUS Your next dose is: Today, Tomorrow Other:  ____________ Dose:  10 Units 10 Units by SubCUTAneous route nightly. Indications: type 2 diabetes mellitus Quantity:  1 Vial  
Refills:  2  
     
   
   
   
  
 lansoprazole 30 mg capsule Commonly known as:  PREVACID Your next dose is: Today, Tomorrow Other:  ____________ Dose:  30 mg Take 30 mg by mouth Daily (before breakfast). Refills:  0  
     
   
   
   
  
 lisinopril 10 mg tablet Commonly known as:  Malia Pares Your next dose is: Today, Tomorrow Other:  ____________ Dose:  40 mg Take 40 mg by mouth daily. Refills:  0 SINGULAIR 10 mg tablet Generic drug:  montelukast  
   
 Your next dose is: Today, Tomorrow Other:  ____________ Dose:  10 mg Take 10 mg by mouth daily. Refills:  0 SITagliptin 100 mg tablet Commonly known as:  Tania Anaya Your next dose is: Today, Tomorrow Other:  ____________ Dose:  100 mg Take 1 Tab by mouth daily for 30 days. Indications: type 2 diabetes mellitus Quantity:  30 Tab Refills:  0 SPIRIVA WITH HANDIHALER 18 mcg inhalation capsule Generic drug:  tiotropium Your next dose is: Today, Tomorrow Other:  ____________ Dose:  1 Cap Take 1 Cap by inhalation daily. Refills:  0 Take these medications as needed Dose & Instructions Dispensing Information Comments Morning Noon Evening Bedtime * albuterol 2.5 mg /3 mL (0.083 %) nebulizer solution Commonly known as:  PROVENTIL VENTOLIN Your next dose is: Today, Tomorrow Other:  ____________ Dose:  2.5 mg  
2.5 mg by Nebulization route every four (4) hours as needed for Wheezing. Refills:  0  
     
   
   
   
  
 * albuterol 90 mcg/actuation inhaler Commonly known as:  PROVENTIL HFA, VENTOLIN HFA, PROAIR HFA Your next dose is: Today, Tomorrow Other:  ____________ Dose:  2 Puff Take 2 Puffs by inhalation every four (4) hours as needed for Wheezing. For the next 2 days after hospital discharge, use your inhaler 4 times a day. Quantity:  1 Inhaler Refills:  0  
     
   
   
   
  
 glucose 4 gram chewable tablet Your next dose is: Today, Tomorrow Other:  ____________ Dose:  4 Tab Take 4 Tabs by mouth as needed for up to 30 days. Quantity:  30 Tab Refills:  0  
     
   
   
   
  
 inhalational spacing device Commonly known as:  AEROCHAMBER Your next dose is: Today, Tomorrow Other:  ____________ Dose:  1 Each 1 Each by Does Not Apply route as needed for Cough or Other (COPD exacerbation). Quantity:  1 Device Refills:  0 MUCINEX 600 mg ER tablet Generic drug:  guaiFENesin ER Your next dose is: Today, Tomorrow Other:  ____________ Dose:  600 mg Take 600 mg by mouth two (2) times daily as needed for Congestion. Refills:  0  
     
   
   
   
  
 triamcinolone acetonide 0.1 % ointment Commonly known as:  KENALOG Your next dose is: Today, Tomorrow Other:  ____________ Apply  to affected area two (2) times daily as needed for Skin Irritation. use thin layer Refills:  0  
     
   
   
   
  
 * Notice: This list has 2 medication(s) that are the same as other medications prescribed for you. Read the directions carefully, and ask your doctor or other care provider to review them with you. Take these medications as directed Dose & Instructions Dispensing Information Comments Morning Noon Evening Bedtime * Diabetic Supplies, [address]. Misc Your next dose is: Today, Tomorrow Other:  ____________ Diabetic needles 1/2 inch 31 gauge - 1 injection daily Quantity:  30 Each Refills:  5  
     
   
   
   
  
 * Diabetic Supplies, Miscellan. Misc Your next dose is: Today, Tomorrow Other:  ____________ Diabetic syringes 1 mL - use one daily Quantity:  30 Each Refills:  5  
     
   
   
   
  
 * Diabetic Supplies, Miscellan. Misc Your next dose is: Today, Tomorrow Other:  ____________ Diabetic test strips - use three times daily Quantity:  90 Each Refills:  5  
     
   
   
   
  
 * Diabetic Supplies, Miscellan. Misc Your next dose is: Today, Tomorrow Other:  ____________ Diabetic lancets - use three times daily Quantity:  90 Each Refills:  5  
     
   
   
   
  
 insulin lispro 100 unit/mL injection Commonly known as:  HumaLOG Your next dose is: Today, Tomorrow Other:  ____________ Check sugars three times a day before meals, if blood sugar is greater than 250 give yourself 8 units of Humalog before eating. Indications: type 2 diabetes mellitus Quantity:  1 Vial  
Refills:  2  
     
   
   
   
  
 * predniSONE 10 mg tablet Commonly known as:  Ciara Elkinser Your next dose is: Today, Tomorrow Other:  ____________ Take 40 mg for 2 days, then take 20 mg for 3 days, and then take 10 mg 3 days Quantity:  17 Tab Refills:  0  
     
   
   
   
  
 * Notice: This list has 5 medication(s) that are the same as other medications prescribed for you. Read the directions carefully, and ask your doctor or other care provider to review them with you. ASK your doctor about these medications Dose & Instructions Dispensing Information Comments Morning Noon Evening Bedtime * predniSONE 50 mg tablet Commonly known as:  Ciara Byrne Ask about: Should I take this medication? Your next dose is: Today, Tomorrow Other:  ____________ Dose:  50 mg Take 1 Tab by mouth daily for 5 days. Quantity:  5 Tab Refills:  0  
     
   
   
   
  
 * Notice: This list has 1 medication(s) that are the same as other medications prescribed for you. Read the directions carefully, and ask your doctor or other care provider to review them with you. Where to Get Your Medications Information about where to get these medications is not yet available ! Ask your nurse or doctor about these medications Diabetic Supplies, [address] [facility] [place] [address] [facility] [place] [place] [address] [facility] [place] [address] [facility] [place]  
 glucose 4 gram chewable tablet  
 insulin glargine 100 unit/mL injection  
 insulin lispro 100 unit/mL injection  
 predniSONE 10 mg tablet  
 predniSONE 50 mg tablet SITagliptin 100 mg tablet

## 2017-02-08 NOTE — Clinical Note
Status[de-identified] Inpatient [101] Type of Bed: Telemetry Remote [29] Inpatient Hospitalization Certified Necessary for the Following Reasons: 3. Patient receiving treatment that can only be provided in an inpatient setting (further clarification in H&P documentation) Admitting Diagnosis: Acute exacerbation of COPD with asthma ([facility] [[id number]] Admitting Physician: Rosalind Lockhart Attending Physician: Rosalind Lockhart Estimated Length of Stay: > or = to 2 Midnights Discharge Plan[de-identified] Home with Office Follow-up

## 2017-02-09 PROBLEM — K57.90 DIVERTICULOSIS: Status: ACTIVE | Noted: 2017-02-09

## 2017-02-09 PROBLEM — I50.32 DIASTOLIC CHF, CHRONIC (HCC): Status: ACTIVE | Noted: 2017-02-09

## 2017-02-09 LAB
ANION GAP BLD CALC-SCNC: 11 MMOL/L (ref 3–18)
ATRIAL RATE: 107 BPM
BASOPHILS # BLD AUTO: 0 K/UL (ref 0–0.1)
BASOPHILS # BLD: 0 % (ref 0–2)
BUN SERPL-MCNC: 11 MG/DL (ref 7–18)
BUN/CREAT SERPL: 11 (ref 12–20)
CALCIUM SERPL-MCNC: 9.4 MG/DL (ref 8.5–10.1)
CALCULATED P AXIS, ECG09: 54 DEGREES
CALCULATED R AXIS, ECG10: 35 DEGREES
CALCULATED T AXIS, ECG11: 39 DEGREES
CHLORIDE SERPL-SCNC: 102 MMOL/L (ref 100–108)
CK MB CFR SERPL CALC: 1.1 % (ref 0–4)
CK MB CFR SERPL CALC: 1.6 % (ref 0–4)
CK MB CFR SERPL CALC: ABNORMAL % (ref 0–4)
CK MB SERPL-MCNC: 0.8 NG/ML (ref 0.5–3.6)
CK MB SERPL-MCNC: 0.8 NG/ML (ref 0.5–3.6)
CK MB SERPL-MCNC: <0.5 NG/ML (ref 0.5–3.6)
CK SERPL-CCNC: 50 U/L (ref 26–192)
CK SERPL-CCNC: 56 U/L (ref 26–192)
CK SERPL-CCNC: 75 U/L (ref 26–192)
CO2 SERPL-SCNC: 26 MMOL/L (ref 21–32)
CREAT SERPL-MCNC: 0.99 MG/DL (ref 0.6–1.3)
DIAGNOSIS, 93000: NORMAL
DIFFERENTIAL METHOD BLD: ABNORMAL
EOSINOPHIL # BLD: 0 K/UL (ref 0–0.4)
EOSINOPHIL NFR BLD: 0 % (ref 0–5)
ERYTHROCYTE [DISTWIDTH] IN BLOOD BY AUTOMATED COUNT: 14 % (ref 11.6–14.5)
GLUCOSE BLD STRIP.AUTO-MCNC: 228 MG/DL (ref 70–110)
GLUCOSE BLD STRIP.AUTO-MCNC: 242 MG/DL (ref 70–110)
GLUCOSE BLD STRIP.AUTO-MCNC: 245 MG/DL (ref 70–110)
GLUCOSE BLD STRIP.AUTO-MCNC: 250 MG/DL (ref 70–110)
GLUCOSE BLD STRIP.AUTO-MCNC: 264 MG/DL (ref 70–110)
GLUCOSE SERPL-MCNC: 262 MG/DL (ref 74–99)
HCT VFR BLD AUTO: 37.8 % (ref 35–45)
HGB BLD-MCNC: 12.8 G/DL (ref 12–16)
LACTATE SERPL-SCNC: 3.4 MMOL/L (ref 0.4–2)
LACTATE SERPL-SCNC: 3.8 MMOL/L (ref 0.4–2)
LACTATE SERPL-SCNC: 3.9 MMOL/L (ref 0.4–2)
LYMPHOCYTES # BLD AUTO: 4 % (ref 21–52)
LYMPHOCYTES # BLD: 0.4 K/UL (ref 0.9–3.6)
MCH RBC QN AUTO: 30.7 PG (ref 24–34)
MCHC RBC AUTO-ENTMCNC: 33.9 G/DL (ref 31–37)
MCV RBC AUTO: 90.6 FL (ref 74–97)
MONOCYTES # BLD: 0.4 K/UL (ref 0.05–1.2)
MONOCYTES NFR BLD AUTO: 5 % (ref 3–10)
NEUTS SEG # BLD: 8.4 K/UL (ref 1.8–8)
NEUTS SEG NFR BLD AUTO: 91 % (ref 40–73)
P-R INTERVAL, ECG05: 170 MS
PLATELET # BLD AUTO: 267 K/UL (ref 135–420)
PMV BLD AUTO: 9.4 FL (ref 9.2–11.8)
POTASSIUM SERPL-SCNC: 4.3 MMOL/L (ref 3.5–5.5)
Q-T INTERVAL, ECG07: 346 MS
QRS DURATION, ECG06: 90 MS
QTC CALCULATION (BEZET), ECG08: 461 MS
RBC # BLD AUTO: 4.17 M/UL (ref 4.2–5.3)
SODIUM SERPL-SCNC: 139 MMOL/L (ref 136–145)
TROPONIN I SERPL-MCNC: <0.02 NG/ML (ref 0–0.04)
VENTRICULAR RATE, ECG03: 107 BPM
WBC # BLD AUTO: 9.3 K/UL (ref 4.6–13.2)

## 2017-02-09 PROCEDURE — 74011636637 HC RX REV CODE- 636/637: Performed by: FAMILY MEDICINE

## 2017-02-09 PROCEDURE — 74011250637 HC RX REV CODE- 250/637: Performed by: FAMILY MEDICINE

## 2017-02-09 PROCEDURE — 74011250636 HC RX REV CODE- 250/636: Performed by: FAMILY MEDICINE

## 2017-02-09 PROCEDURE — 82550 ASSAY OF CK (CPK): CPT | Performed by: FAMILY MEDICINE

## 2017-02-09 PROCEDURE — 74011000250 HC RX REV CODE- 250: Performed by: FAMILY MEDICINE

## 2017-02-09 PROCEDURE — 80048 BASIC METABOLIC PNL TOTAL CA: CPT | Performed by: FAMILY MEDICINE

## 2017-02-09 PROCEDURE — 94640 AIRWAY INHALATION TREATMENT: CPT

## 2017-02-09 PROCEDURE — 36415 COLL VENOUS BLD VENIPUNCTURE: CPT | Performed by: FAMILY MEDICINE

## 2017-02-09 PROCEDURE — 87070 CULTURE OTHR SPECIMN AEROBIC: CPT | Performed by: INTERNAL MEDICINE

## 2017-02-09 PROCEDURE — 65660000000 HC RM CCU STEPDOWN

## 2017-02-09 PROCEDURE — 93005 ELECTROCARDIOGRAM TRACING: CPT

## 2017-02-09 PROCEDURE — 83605 ASSAY OF LACTIC ACID: CPT | Performed by: FAMILY MEDICINE

## 2017-02-09 PROCEDURE — 74011250636 HC RX REV CODE- 250/636: Performed by: INTERNAL MEDICINE

## 2017-02-09 PROCEDURE — 94660 CPAP INITIATION&MGMT: CPT

## 2017-02-09 PROCEDURE — 74011000250 HC RX REV CODE- 250: Performed by: INTERNAL MEDICINE

## 2017-02-09 PROCEDURE — 85025 COMPLETE CBC W/AUTO DIFF WBC: CPT | Performed by: FAMILY MEDICINE

## 2017-02-09 PROCEDURE — 82962 GLUCOSE BLOOD TEST: CPT

## 2017-02-09 RX ORDER — IPRATROPIUM BROMIDE AND ALBUTEROL SULFATE 2.5; .5 MG/3ML; MG/3ML
3 SOLUTION RESPIRATORY (INHALATION)
Status: DISCONTINUED | OUTPATIENT
Start: 2017-02-09 | End: 2017-02-09

## 2017-02-09 RX ORDER — ARFORMOTEROL TARTRATE 15 UG/2ML
15 SOLUTION RESPIRATORY (INHALATION)
Status: DISCONTINUED | OUTPATIENT
Start: 2017-02-09 | End: 2017-02-14 | Stop reason: HOSPADM

## 2017-02-09 RX ORDER — BUDESONIDE 0.5 MG/2ML
500 INHALANT ORAL
Status: DISCONTINUED | OUTPATIENT
Start: 2017-02-09 | End: 2017-02-14 | Stop reason: HOSPADM

## 2017-02-09 RX ORDER — MONTELUKAST SODIUM 10 MG/1
10 TABLET ORAL
Status: DISCONTINUED | OUTPATIENT
Start: 2017-02-09 | End: 2017-02-13

## 2017-02-09 RX ORDER — IPRATROPIUM BROMIDE AND ALBUTEROL SULFATE 2.5; .5 MG/3ML; MG/3ML
3 SOLUTION RESPIRATORY (INHALATION)
Status: DISCONTINUED | OUTPATIENT
Start: 2017-02-09 | End: 2017-02-14 | Stop reason: HOSPADM

## 2017-02-09 RX ADMIN — PANTOPRAZOLE SODIUM 40 MG: 40 TABLET, DELAYED RELEASE ORAL at 09:07

## 2017-02-09 RX ADMIN — LEVOFLOXACIN 750 MG: 5 INJECTION, SOLUTION INTRAVENOUS at 13:57

## 2017-02-09 RX ADMIN — ARFORMOTEROL TARTRATE 15 MCG: 15 SOLUTION RESPIRATORY (INHALATION) at 19:53

## 2017-02-09 RX ADMIN — INSULIN LISPRO 6 UNITS: 100 INJECTION, SOLUTION INTRAVENOUS; SUBCUTANEOUS at 23:25

## 2017-02-09 RX ADMIN — GUAIFENESIN 600 MG: 600 TABLET, EXTENDED RELEASE ORAL at 09:07

## 2017-02-09 RX ADMIN — METHYLPREDNISOLONE SODIUM SUCCINATE 40 MG: 125 INJECTION, POWDER, FOR SOLUTION INTRAMUSCULAR; INTRAVENOUS at 09:08

## 2017-02-09 RX ADMIN — IPRATROPIUM BROMIDE AND ALBUTEROL SULFATE 3 ML: .5; 3 SOLUTION RESPIRATORY (INHALATION) at 11:26

## 2017-02-09 RX ADMIN — GUAIFENESIN 600 MG: 600 TABLET, EXTENDED RELEASE ORAL at 04:11

## 2017-02-09 RX ADMIN — INSULIN LISPRO 4 UNITS: 100 INJECTION, SOLUTION INTRAVENOUS; SUBCUTANEOUS at 09:08

## 2017-02-09 RX ADMIN — ENOXAPARIN SODIUM 40 MG: 100 INJECTION SUBCUTANEOUS at 17:14

## 2017-02-09 RX ADMIN — GUAIFENESIN 600 MG: 600 TABLET, EXTENDED RELEASE ORAL at 17:15

## 2017-02-09 RX ADMIN — LISINOPRIL 40 MG: 20 TABLET ORAL at 09:07

## 2017-02-09 RX ADMIN — ASPIRIN 81 MG: 81 TABLET, COATED ORAL at 09:07

## 2017-02-09 RX ADMIN — MONTELUKAST SODIUM 10 MG: 10 TABLET, FILM COATED ORAL at 21:45

## 2017-02-09 RX ADMIN — METHYLPREDNISOLONE SODIUM SUCCINATE 40 MG: 125 INJECTION, POWDER, FOR SOLUTION INTRAMUSCULAR; INTRAVENOUS at 17:15

## 2017-02-09 RX ADMIN — INSULIN LISPRO 9 UNITS: 100 INJECTION, SOLUTION INTRAVENOUS; SUBCUTANEOUS at 17:15

## 2017-02-09 RX ADMIN — INSULIN LISPRO 6 UNITS: 100 INJECTION, SOLUTION INTRAVENOUS; SUBCUTANEOUS at 11:46

## 2017-02-09 RX ADMIN — METHYLPREDNISOLONE SODIUM SUCCINATE 40 MG: 125 INJECTION, POWDER, FOR SOLUTION INTRAMUSCULAR; INTRAVENOUS at 23:27

## 2017-02-09 RX ADMIN — IPRATROPIUM BROMIDE AND ALBUTEROL SULFATE 3 ML: .5; 3 SOLUTION RESPIRATORY (INHALATION) at 04:12

## 2017-02-09 RX ADMIN — BUDESONIDE 500 MCG: 0.5 INHALANT RESPIRATORY (INHALATION) at 19:53

## 2017-02-09 RX ADMIN — IPRATROPIUM BROMIDE AND ALBUTEROL SULFATE 3 ML: .5; 3 SOLUTION RESPIRATORY (INHALATION) at 08:58

## 2017-02-09 NOTE — PROGRESS NOTES
conducted an initial consultation and Spiritual Assessment for eMlissa Giron, who is a 61 y.o.,female. Patients Primary Language is: Reyna Hsieh. According to the patients EMR Religion Affiliation is: Charlotte Del Real.     The reason the Patient came to the hospital is:   Patient Active Problem List    Diagnosis Date Noted    Diastolic CHF, chronic ([facility] 02/09/2017    Diverticulosis 02/09/2017    COPD exacerbation ([facility] 02/08/2017    Acute exacerbation of COPD with asthma ([facility] 02/08/2017    Obesity, Class III, BMI 40-49.9 (morbid obesity) ([facility] 08/09/2016    Chest pain 08/09/2016    Dyspnea 08/09/2016    COPD with acute exacerbation ([facility] 05/24/2015    COPD (chronic obstructive pulmonary disease) (HCC) 03/27/2015    Allergic rhinitis 03/27/2015    Essential hypertension, benign 09/30/2012    Diabetes mellitus, type 2 ([facility] 09/30/2012    Esophageal reflux 09/30/2012    SHERRIE on CPAP         The  provided the following Interventions:  Initiated a relationship of care and support. Provided information about Spiritual Care Services. Chart reviewed. The following outcomes where achieved:  Patient expressed gratitude for 's visit. Assessment:  Patient does not have any Christianity/cultural needs that will affect patients preferences in health care. There are no spiritual or Christianity issues which require intervention at this time. Plan:  Chaplains will continue to follow and will provide pastoral care on an as needed/requested basis.  recommends bedside caregivers page  on duty if patient shows signs of acute spiritual or emotional distress.     [address]  [phone])

## 2017-02-09 NOTE — PROGRESS NOTES
Intern Progress Note  [facility]       Patient: Viv Villarreal MRN: [id number]  CSN: [id number]    YOB: 1960  Age: 61 y.o. Sex: female    DOA: 2/8/2017 LOS:  LOS: 1 day                    Subjective:     Acute events:   Reports that her current symptoms are not the worse the have been, nor the best either. She is having difficulty speaking in complete sentences, and endorses chest tightness and chest warmth, \"I feel like I have just run 3 laps around the track,\" without radiation to neck, jaw, shoulders. She is reporting leg swelling since being in the ED, of note she did receive 2L NS infusion. Patient reports having had similar COPD exacerbations requiring hospitalization in past, but that she has not had to be hospitalized in last 3 years. She has never had to be intubated. Review of Systems   Constitutional: Negative for chills and fever. Respiratory: Positive for cough, sputum production, shortness of breath and wheezing. Cardiovascular: Positive for chest pain and leg swelling. Gastrointestinal: Positive for heartburn and nausea. Negative for vomiting. Skin: Negative for itching and rash. Neurological: Positive for speech change and headaches.        Objective:      Patient Vitals for the past 24 hrs:   Temp Pulse Resp BP SpO2   02/08/17 2215 - 99 25 143/73 94 %   02/08/17 2145 - (!) 102 21 146/59 91 %   02/08/17 2115 - (!) 101 23 111/52 90 %   02/08/17 2045 - (!) 104 28 128/61 91 %   02/08/17 2015 - (!) 103 25 149/49 92 %   02/08/17 1945 - 93 25 129/52 92 %   02/08/17 1915 - 92 23 136/66 93 %   02/08/17 1830 - 87 23 128/57 93 %   02/08/17 1800 - 99 27 133/56 92 %   02/08/17 1730 - (!) 101 29 137/62 93 %   02/08/17 1700 - 99 25 139/62 93 %   02/08/17 1630 - (!) 101 28 138/51 93 %   02/08/17 1530 - - - 144/61 94 %   02/08/17 1500 - - - 139/65 98 %   02/08/17 0930 - - - 124/55 92 %   02/08/17 0900 - - - 137/60 90 %   02/08/17 0830 - - - 131/59 93 % 02/08/17 0804 98.1 °F (36.7 °C) (!) 102 16 136/72 99 %   02/08/17 0800 - - - 145/61 90 %   02/08/17 0730 - - - 152/72 93 %       No intake or output data in the 24 hours ending 02/09/17 0304    Physical Exam   Constitutional: She appears well-developed. She is active. She has a sickly appearance. She appears distressed. Nasal cannula in place. Pulmonary/Chest: Accessory muscle usage present. No apnea and no tachypnea. She is in respiratory distress. She has decreased breath sounds in the right middle field, the right lower field, the left middle field and the left lower field. She has wheezes in the right middle field and the right lower field. She has no rhonchi. She has no rales. She exhibits no tenderness. Abdominal: Soft. Bowel sounds are normal.   Neurological: She is alert. Skin: Skin is warm and dry. She is not diaphoretic. Psychiatric: She has a normal mood and affect. Her behavior is normal. Thought content normal.   Nursing note and vitals reviewed. Recent Results (from the past 24 hour(s))   CBC WITH AUTOMATED DIFF    Collection Time: 02/08/17  7:45 AM   Result Value Ref Range    WBC 8.4 4.6 - 13.2 K/uL    RBC 4.35 4.20 - 5.30 M/uL    HGB 13.3 12.0 - 16.0 g/dL    HCT 38.7 35.0 - 45.0 %    MCV 89.0 74.0 - 97.0 FL    MCH 30.6 24.0 - 34.0 PG    MCHC 34.4 31.0 - 37.0 g/dL    RDW 13.4 11.6 - 14.5 %    PLATELET 414 576 - 445 K/uL    MPV 9.0 (L) 9.2 - 11.8 FL    NEUTROPHILS 75 (H) 40 - 73 %    LYMPHOCYTES 18 (L) 21 - 52 %    MONOCYTES 6 3 - 10 %    EOSINOPHILS 1 0 - 5 %    BASOPHILS 0 0 - 2 %    ABS. NEUTROPHILS 6.3 1.8 - 8.0 K/UL    ABS. LYMPHOCYTES 1.5 0.9 - 3.6 K/UL    ABS. MONOCYTES 0.5 0.05 - 1.2 K/UL    ABS. EOSINOPHILS 0.1 0.0 - 0.4 K/UL    ABS.  BASOPHILS 0.0 0.0 - 0.06 K/UL    DF AUTOMATED     METABOLIC PANEL, BASIC    Collection Time: 02/08/17  7:45 AM   Result Value Ref Range    Sodium 139 136 - 145 mmol/L    Potassium 3.5 3.5 - 5.5 mmol/L    Chloride 100 100 - 108 mmol/L    CO2 30 21 - 32 mmol/L    Anion gap 9 3.0 - 18 mmol/L    Glucose 245 (H) 74 - 99 mg/dL    BUN 11 7.0 - 18 MG/DL    Creatinine 1.03 0.6 - 1.3 MG/DL    BUN/Creatinine ratio 11 (L) 12 - 20      GFR est AA >60 >60 ml/min/1.73m2    GFR est non-AA 55 (L) >60 ml/min/1.73m2    Calcium 9.4 8.5 - 10.1 MG/DL   CARDIAC PANEL,(CK, CKMB & TROPONIN)    Collection Time: 02/08/17  7:45 AM   Result Value Ref Range    CK 73 26 - 192 U/L    CK - MB 1.2 0.5 - 3.6 ng/ml    CK-MB Index 1.6 0.0 - 4.0 %    Troponin-I, Qt. <0.02 0.0 - 0.045 NG/ML   PRO-BNP    Collection Time: 02/08/17  7:45 AM   Result Value Ref Range    NT pro-BNP 41 0 - 900 PG/ML   HEMOGLOBIN A1C WITH EAG    Collection Time: 02/08/17  7:45 AM   Result Value Ref Range    Hemoglobin A1c 8.0 (H) 4.2 - 5.6 %    Est. average glucose 183 mg/dL   POC LACTIC ACID    Collection Time: 02/08/17  7:57 AM   Result Value Ref Range    Lactic Acid (POC) 3.4 (HH) 0.4 - 2.0 mmol/L   EKG, 12 LEAD, INITIAL    Collection Time: 02/08/17  8:08 AM   Result Value Ref Range    Ventricular Rate 97 BPM    Atrial Rate 97 BPM    P-R Interval 148 ms    QRS Duration 82 ms    Q-T Interval 348 ms    QTC Calculation (Bezet) 441 ms    Calculated P Axis 25 degrees    Calculated R Axis 21 degrees    Calculated T Axis 32 degrees    Diagnosis       Normal sinus rhythm  Normal ECG  When compared with ECG of 18-SEP-2016 14:10,  No significant change was found    Confirmed by Mauro Lyon ([id number]) on 2/8/2017 4:14:59 PM     POC LACTIC ACID    Collection Time: 02/08/17 12:15 PM   Result Value Ref Range    Lactic Acid (POC) 4.0 (HH) 0.4 - 2.0 mmol/L   POC LACTIC ACID    Collection Time: 02/08/17  1:53 PM   Result Value Ref Range    Lactic Acid (POC) 4.2 (HH) 0.4 - 2.0 mmol/L   URINALYSIS W/ RFLX MICROSCOPIC    Collection Time: 02/08/17  3:30 PM   Result Value Ref Range    Color YELLOW      Appearance CLEAR      Specific gravity 1.026 1.005 - 1.030      pH (UA) 5.0 5.0 - 8.0      Protein NEGATIVE  NEG mg/dL    Glucose >1000 (A) NEG mg/dL    Ketone TRACE (A) NEG mg/dL    Bilirubin NEGATIVE  NEG      Blood NEGATIVE  NEG      Urobilinogen 0.2 0.2 - 1.0 EU/dL    Nitrites NEGATIVE  NEG      Leukocyte Esterase NEGATIVE  NEG     POC LACTIC ACID    Collection Time: 02/08/17  6:00 PM   Result Value Ref Range    Lactic Acid (POC) 5.1 (HH) 0.4 - 2.0 mmol/L   GLUCOSE, POC    Collection Time: 02/08/17  6:02 PM   Result Value Ref Range    Glucose (POC) 284 (H) 70 - 110 mg/dL   HEPATIC FUNCTION PANEL    Collection Time: 02/08/17 10:00 PM   Result Value Ref Range    Protein, total 7.5 6.4 - 8.2 g/dL    Albumin 3.5 3.4 - 5.0 g/dL    Globulin 4.0 2.0 - 4.0 g/dL    A-G Ratio 0.9 0.8 - 1.7      Bilirubin, total 0.6 0.2 - 1.0 MG/DL    Bilirubin, direct <0.1 0.0 - 0.2 MG/DL    Alk. phosphatase 81 45 - 117 U/L    AST (SGOT) 16 15 - 37 U/L    ALT (SGPT) 52 13 - 56 U/L   METABOLIC PANEL, BASIC    Collection Time: 02/08/17 10:00 PM   Result Value Ref Range    Sodium 139 136 - 145 mmol/L    Potassium 4.0 3.5 - 5.5 mmol/L    Chloride 103 100 - 108 mmol/L    CO2 26 21 - 32 mmol/L    Anion gap 10 3.0 - 18 mmol/L    Glucose 242 (H) 74 - 99 mg/dL    BUN 11 7.0 - 18 MG/DL    Creatinine 1.00 0.6 - 1.3 MG/DL    BUN/Creatinine ratio 11 (L) 12 - 20      GFR est AA >60 >60 ml/min/1.73m2    GFR est non-AA 57 (L) >60 ml/min/1.73m2    Calcium 9.4 8.5 - 10.1 MG/DL   GLUCOSE, POC    Collection Time: 02/08/17 10:05 PM   Result Value Ref Range    Glucose (POC) 239 (H) 70 - 110 mg/dL   POC G3    Collection Time: 02/08/17 10:16 PM   Result Value Ref Range    Device: ROOM AIR      FIO2 (POC) 21 %    pH (POC) 7.370 7.35 - 7.45      pCO2 (POC) 40.7 35.0 - 45.0 MMHG    pO2 (POC) 66 (L) 80 - 100 MMHG    HCO3 (POC) 23.5 22 - 26 MMOL/L    sO2 (POC) 92 92 - 97 %    Base deficit (POC) 2 mmol/L    Allens test (POC) YES      Total resp. rate 26      Site RIGHT RADIAL      Patient temp.  37.0      Specimen type (POC) ARTERIAL      Performed by Ange Castillo    POC LACTIC ACID    Collection Time: 02/08/17 10:17 PM   Result Value Ref Range    Lactic Acid (POC) 3.3 (HH) 0.4 - 2.0 mmol/L        Scheduled Medications Reviewed:  Current Facility-Administered Medications   Medication Dose Route Frequency    enoxaparin (LOVENOX) injection 40 mg  40 mg SubCUTAneous Q24H    albuterol-ipratropium (DUO-NEB) 2.5 MG-0.5 MG/3 ML  3 mL Nebulization Q4H RT    levoFLOXacin (LEVAQUIN) 750 mg in D5W IVPB  750 mg IntraVENous Q24H    methylPREDNISolone (PF) (SOLU-MEDROL) injection 40 mg  40 mg IntraVENous Q8H    aspirin delayed-release tablet 81 mg  81 mg Oral DAILY    budesonide-formoterol (SYMBICORT) 80-4.5 mcg inhaler  2 Puff Inhalation BID RT    pantoprazole (PROTONIX) tablet 40 mg  40 mg Oral ACB    lisinopril (PRINIVIL, ZESTRIL) tablet 40 mg  40 mg Oral DAILY    montelukast (SINGULAIR) tablet 10 mg  10 mg Oral DAILY    insulin lispro (HUMALOG) injection   SubCUTAneous Q6H    * Please Update Patient's Weight/Height  1 Each Other NOW     Imaging, microbiology, and EKG/Telemetry:    Results    XR CHEST PORT (Accession [id number]) (Order [id number])         Allergies        High: Ancef [Cefazolin]; Contrast Agent [Iodine]     Unspecified: Metformin     Low: Codeine       Result Information      Status Provider Status        Final result (Exam End: 2/8/2017  8:02 AM) Open        Study Result      Procedure: Chest portable.     Indication: Shortness of breath.     Comparison: 9/18/16, 6/3/15.     Findings: An obliquely oriented bandlike density is identified in the right  lung base. A similar density was noted on 9/18/2016 and 6/3/2015. A focus of  hazy density with circumscribed margins is identified in the left lower lung  zone adjacent to the cardiac apex. This density is found to be related to a  pericardial fat pad on 6/3/15. No new infiltrate or consolidation is  identified. The cardiac silhouette is mildly prominent.     IMPRESSION  Impression:     1. Stable bilateral lower lung zone densities.   2. No new infiltrate or consolidation. 2016  1:02 PM - David, Card Result In       Narrative      [address]   ,    Transthoracic Echocardiogram    Patient: Juanjo Forte  MRN: [id number]  ACCT #: [de-identified]  : 1960  Age: 61 years  Gender: Female  Height: 63 in  Weight: 249 lb  BSA: 2.12 mï¾²  BP: 150 / 92 mmHg  Study date: 02-Sep-2016  Status: Routine  Location: [facility] ACC #: [id number]    Referring_Ordering Physician: Lenora Kwon MD  Interpreting Group:  [facility]  Interpreting Physician: Lenora Kwon MD  Technologist: DIANA Khan    SUMMARY:  Left ventricle: Systolic function was normal. Ejection fraction was  estimated to be 60 %. No obvious wall motion abnormalities identified in  the views obtained. There was moderate concentric hypertrophy. Doppler  parameters were consistent with abnormal left ventricular relaxation  (grade 1 diastolic dysfunction). COMPARISONS:  There has been no significant change. Comparison was made with the  previous study of 19-Aug-2013. INDICATIONS: Shortness of breath. PROCEDURE: This was a routine study. The study included complete 2D  imaging, M-mode, complete spectral Doppler, and color Doppler. The heart  rate was 71 bpm, at the start of the study. Systolic blood pressure was  150 mmHg, at the start of the study. Diastolic blood pressure was 92 mmHg,  at the start of the study. Images were not obtained from the subcostal  acoustic windows. Image quality was fair. LEFT VENTRICLE: Size was normal. Systolic function was normal. Ejection  fraction was estimated to be 60 %. No obvious wall motion abnormalities  identified in the views obtained. There was moderate concentric  hypertrophy. DOPPLER: Doppler parameters were consistent with abnormal  left ventricular relaxation (grade 1 diastolic dysfunction).     RIGHT VENTRICLE: The size was normal. Systolic function was normal.  DOPPLER: Systolic pressure was within the normal range. LEFT ATRIUM: Size was normal. No thrombus was identified. RIGHT ATRIUM: Size was normal.    MITRAL VALVE: Normal valve structure. There was normal leaflet separation. No echocardiographic evidence for prolapse. DOPPLER: The transmitral  velocity was within the normal range. There was no evidence for stenosis. There was no significant regurgitation. AORTIC VALVE: The valve was trileaflet. Leaflets exhibited mildly  increased thickness and normal cuspal separation. DOPPLER: Transaortic  velocity was within the normal range. There was no stenosis. There was no  regurgitation. TRICUSPID VALVE: Normal valve structure. There was normal leaflet  separation. DOPPLER: The transtricuspid velocity was within the normal  range. There was no significant regurgitation. Insufficient tricuspid  regurgitation to estimate pulmonary artery pressure. PULMONIC VALVE: Not well visualized. DOPPLER: The transpulmonic velocity  was within the normal range. There was no significant regurgitation. AORTA: The root exhibited normal size. PULMONARY ARTERY: The size was normal. The morphology appeared normal.    SYSTEMIC VEINS: IVC: The inferior vena cava was not well visualized. PERICARDIUM: There was no thickening. There was no pericardial effusion. SYSTEM MEASUREMENT TABLES    2D  %FS: 46.55 %  Ao Diam: 2.62 cm  EDV(Teich): 70.86 ml  EF(Teich): 78.45 %  ESV(Teich): 15.27 ml  IVSd: 1.6 cm  LVIDd: 4.02 cm  LVIDs: 2.15 cm  LVPWd: 1.69 cm  SV(Teich): 55.59 ml    CW  AV Vmax: 1.36 m/s  AV maxP.45 mmHg    PW  E': 0.04 m/s  E/E': 13.17  MV A Moe: 0.67 m/s  MV Dec Bates: 2.87 m/s2  MV DecT: 203.56 ms  MV E Moe: 0.59 m/s  MV E/A Ratio: 0.88  MV PHT: 59.03 ms  MVA By PHT: 3.73 cm2  P Vein D: 0.03 m/s  P Vein S: 0.26 m/s  P Vein S/D Ratio: 9.02  TAPSV: 0.14 m/s    Prepared and E-signed by    Chucho Red MD  Signed 02-Sep-2016 13:02:23           Assessment/Plan     Acute COPD exacerbation.  Blood gas showing hypoxia, no WBC elevation, afebrile, with normal anion gap lactic acidosis  - Continue solumedrol 40 mg q8h  - Duonebs; will increase to q3hrs   - Levaquin 750 mg daily  - Monitor lactic acidosis, beginning to trend down 3.4>4.0>4.2>5.1>3.3.   - Supplemental oxygen as needed  - Substituting formulary inhaler for Advair  - Pulmonary hygiene per RT   - CPAP at night per RT  - Guaifenesin 600mg PO BID     Diastolic congestive heart failure per echo 09/02/2016 above  · Cautious IV fluid resuscitation (Grade 1 diastolic dysfunction)  · Low salt diet     Chest tightness.  Consistent with COPD exacerbation, but will obtain EKG and trend cardiac enzymes to rule out ACS        Patricio GONZÁLES,BS   PGY-2 [address] [facility]  02/09/17 3:04 AM

## 2017-02-09 NOTE — CDMP QUERY
In order to reflect severity of illness please document pts hx of asthma::::  is Acute Exacerbation or Status Asthmaticus or Chronic  Severity: Mild, Moderate, Severe  Intermittent or Persistent  Clarify if any obstructive component is present      Thank you    QUESTIONS?      Wilfrido Lo [phone]

## 2017-02-09 NOTE — PROGRESS NOTES
Symbicort 80/4.5 was  interchanged for Advair  mcg per Dr. Jane King  Thanks,    Aaron Banks, [facility], Pharmacist  2/8/2017 9:53 PM

## 2017-02-09 NOTE — CONSULTS
[facility]   Pulmonary and Sleep Medicine     Pulmonary Consult Note    Patient: Viv Villarreal               Sex: female          DOA: 2/8/2017       YOB: 1960      Age:  61 y.o.        LOS:  LOS: 1 day        Reason for Consult: COPD exacerbation    IMPRESSION:   · COPD exacerbation with wheezing, mild hypoxia and increased work of breathing - no clinical or radiological evidence of pneumonia and etiology to explain patient's symptoms so doubt VTE. Patient with moderate to severe COPD at baseline  · Elevated lactate - most likely related to increased work of breathing and respiratory muscle fatigue  · Morbid obesity, SHERRIE - on CPAP at home  · Hx of hypertension, chronic diastolic CHF  · Hx of GERD  · Hx of diverticulosis  · Former nicotine dependence   RECOMMENDATIONS:   Start BiPAP routine at night and PRN during day- adjust settings per goal SPO2> 90%  Supplemental O2 via NC when off of BiPAP, titrate flow for goal SPO2> 90%  Check D Dimer and PVL study, low clinical suspicion   ABG in AM  Continue bronchodilators- start Brovana and Pulmicort for patient's ease of use rather than Advair HFA  Steroids - increase dose given ongoing wheezing  Antibiotic choice: Levaquin and Azithromycin  Sputum culture if sample available  Aspiration prevention bundle, head of the bed at 27' all times, pulmonary hygiene care  Glycemic control as needed while on steroids  Stress ulcer prophylaxis  DVT prophylaxis  Will defer respective systems problem management to primary and other consultant and follow patient with primary and other team  Further recommendations will be based on the patient's response to recommended treatment and results of the investigation ordered. HPI:   Ms. Viv Villarreal is a 61 y.o. female who is being seen at the request of Dr. Prabhjot Lobato for COPD exacerbation. The history provided by the patient.  He has PMHx for moderate to severe COPD, SHERRIE on CPAP at home, DM 2, HTN, and GERD came to ED with acute on chronic shortness of breath worsening since Monday associated with cough, chest tightness and wheezing. Patient has followed in past with my associate Dr. Stacy Parisi and since past 3-4 yrs follows with PCP for COPD management. She reports last exacerbation in Nov and that required 2 courses of steroids with improvement late in last month and followed with PCP last week. Since symptoms, she tried several albuterol nebulizer treatments, but these did not alleviate her symptoms. She denies any fevers, chills, sick contacts, calf pain or swelling, palpitations, syncope, orthopnea, edema or paroxysmal nocturnal dyspnea. Since admitted she continued to have increased work of breathing and her lactic acidosis worsened despite 2L of IV fluids when she was transferred to step down and this consult was requested. Patient reports no major change in her symptoms.     Review of Systems  General ROS: negative for - chills, fever, malaise or night sweats  Psychological ROS: negative for - anxiety or depression  Ophthalmic ROS: negative for - eye pain, loss of vision or photophobia  ENT ROS: negative for - epistaxis, sinus pain, sore throat or vocal changes  Allergy and Immunology ROS: negative for - hives or postnasal drip  Hematological and Lymphatic ROS: negative for - bleeding problems, blood clots, bruising, jaundice, pallor or swollen lymph nodes  Endocrine ROS: negative for - polydipsia/polyuria, skin changes, temperature intolerance or unexpected weight changes  Cardiovascular ROS: negative for - chest pain, edema, murmur, orthopnea or palpitations  Gastrointestinal ROS: no abdominal pain, change in bowel habits, or black or bloody stools  Genito-Urinary ROS: no dysuria, trouble voiding, or hematuria  Musculoskeletal ROS: positive for - joint pain, joint stiffness and back pain  Neurological ROS: no TIA or stroke symptoms  Dermatological ROS: negative for - pruritus, rash or skin lesion changes     Past Medical History  Past Medical History   Diagnosis Date    Asthma     COPD     Cystocele, midline     Diabetes mellitus ([facility] [address]     GERD (gastroesophageal reflux disease)     Hidradenitis suppurativa     Hyperlipidemia     Hypertension     SHERRIE on CPAP     Stress incontinence        Past Surgical History  Past Surgical History   Procedure Laterality Date    Hx cholecystectomy      Hx appendectomy      Pr breast surgery procedure unlisted       Right breast benign tumor removal    Hx dilation and curettage       Dysfunctional uterine bleeding, thought 2/2 fibroids    Hx tubal ligation         Family History  Family History   Problem Relation Age of Onset    Hypertension Mother     Stroke Mother     Breast Cancer Mother      Bilateral mastectomies    Cancer Mother      ovarian and breast    Heart Failure Mother     Heart Attack Father      2011    Heart Surgery Father      CABG    Heart Failure Father     COPD Sister      Heavy smoker    Cancer Sister      ovarian    Heart Failure Sister     Lung Disease Sister     Asthma Child     Cancer Maternal Aunt      pancreatic    Cancer Maternal Grandfather      stomach       Social History  Social History   Substance Use Topics    Smoking status: Former Smoker     Packs/day: 1.00     Years: 30.00     Types: Cigarettes     Quit date: 9/6/2006    Smokeless tobacco: Never Used      Comment: 1-1.5 packs per day    Alcohol use No        Medications  Current Facility-Administered Medications   Medication Dose Route Frequency    guaiFENesin SR (MUCINEX) tablet 600 mg  600 mg Oral BID    albuterol-ipratropium (DUO-NEB) 2.5 MG-0.5 MG/3 ML  3 mL Nebulization Q4H PRN    arformoterol (BROVANA) neb solution 15 mcg  15 mcg Nebulization BID RT    budesonide (PULMICORT) 500 mcg/2 ml nebulizer suspension  500 mcg Nebulization BID RT    methylPREDNISolone (PF) (SOLU-MEDROL) injection 40 mg  40 mg IntraVENous Q6H    enoxaparin (LOVENOX) injection 40 mg  40 mg SubCUTAneous Q24H    acetaminophen (TYLENOL) tablet 650 mg  650 mg Oral Q4H PRN    levoFLOXacin (LEVAQUIN) 750 mg in D5W IVPB  750 mg IntraVENous Q24H    glucose chewable tablet 16 g  4 Tab Oral PRN    glucagon (GLUCAGEN) injection 1 mg  1 mg IntraMUSCular PRN    dextrose (D50W) injection syrg 12.5-25 g  25-50 mL IntraVENous PRN    aspirin delayed-release tablet 81 mg  81 mg Oral DAILY    pantoprazole (PROTONIX) tablet 40 mg  40 mg Oral ACB    lisinopril (PRINIVIL, ZESTRIL) tablet 40 mg  40 mg Oral DAILY    montelukast (SINGULAIR) tablet 10 mg  10 mg Oral DAILY    insulin lispro (HUMALOG) injection   SubCUTAneous Q6H     Prior to Admission medications    Medication Sig Start Date End Date Taking? Authorizing Provider   azithromycin (ZITHROMAX Z-WESLEY) 250 mg tablet Take 1 Tab by mouth daily. 2/8/17  Yes Ericka Parikh, DO   predniSONE (DELTASONE) 50 mg tablet Take 1 Tab by mouth daily for 5 days. 2/8/17 2/13/17 Yes Ericka Parikh, DO   fluticasone-salmeterol (ADVAIR HFA) 115-21 mcg/actuation inhaler Take 2 Puffs by inhalation two (2) times a day. Yes Historical Provider   aspirin delayed-release 81 mg tablet Take 81 mg by mouth daily. Yes Historical Provider   famotidine (PEPCID) 20 mg tablet Take 20 mg by mouth nightly. Yes Historical Provider   lansoprazole (PREVACID) 30 mg capsule Take 30 mg by mouth Daily (before breakfast). Yes Historical Provider   metFORMIN (GLUCOPHAGE) 500 mg tablet Take  by mouth two (2) times daily (with meals). Yes Historical Provider   lisinopril (PRINIVIL, ZESTRIL) 10 mg tablet Take 40 mg by mouth daily. Yes Historical Provider   albuterol (PROVENTIL HFA, VENTOLIN HFA, PROAIR HFA) 90 mcg/actuation inhaler Take 2 Puffs by inhalation every four (4) hours as needed for Wheezing. For the next 2 days after hospital discharge, use your inhaler 4 times a day.  3/9/16  Yes Enedina Terry MD   tiotropium ([address] [place] [address]) 18 mcg inhalation capsule Take 1 Cap by inhalation daily. Yes Historical Provider   albuterol (PROVENTIL VENTOLIN) 2.5 mg /3 mL (0.083 %) nebulizer solution 2.5 mg by Nebulization route every four (4) hours as needed for Wheezing. Yes Historical Provider   Inhalational Spacing Device (AEROCHAMBER) 1 Each by Does Not Apply route as needed for Cough or Other (COPD exacerbation). 10/2/12  Yes Danitza South, DO   fluticasone (FLONASE) 50 mcg/actuation nasal spray 2 Sprays by Both Nostrils route daily. Yes Historical Provider   montelukast (SINGULAIR) 10 mg tablet Take 10 mg by mouth daily. Yes Phys Other, MD   guaiFENesin ER (MUCINEX) 600 mg ER tablet Take 600 mg by mouth two (2) times daily as needed for Congestion. Historical Provider   triamcinolone acetonide (KENALOG) 0.1 % ointment Apply  to affected area two (2) times daily as needed for Skin Irritation. use thin layer    Historical Provider       Allergy  Allergies   Allergen Reactions    Ancef [Cefazolin] Hives    Contrast Agent [Iodine] Anaphylaxis, Shortness of Breath and Swelling     Needs pre-medication for IV contrast with Benadryl, Solu-Medrol    Metformin Other (comments)     Abdominal pain, diarrhea.  Codeine Other (comments)     Altered mental status       Physical Exam:   Vital Signs:    Blood pressure 136/58, pulse (!) 108, temperature 98.1 °F (36.7 °C), resp. rate 19, height 5' 3\" (1.6 m), weight 113.4 kg (250 lb), SpO2 96 %. Body mass index is 44.29 kg/(m^2).     O2 Device: Nasal cannula   O2 Flow Rate (L/min): 2 l/min   Temp (24hrs), Av.3 °F (36.8 °C), Min:98.1 °F (36.7 °C), Max:98.4 °F (36.9 °C)       Intake/Output:   Last shift:         Last 3 shifts:    No intake or output data in the 24 hours ending 17 1320   General: in no respiratory distress and acyanotic and oriented times 3, alert, cooperative, appears older than stated age, morbidly obese, on O2 via NC  HEENT: PERRLA, EOMI, fundi benign, throat normal without erythema or exudate  Neck: No abnormally enlarged lymph nodes or thyroid, supple  Chest: normal, obese  Lungs: moderate air entry, end expiratory wheeze and prolonged expiration, normal percussion bilaterally, exam limitation 2' to body habitus, no tenderness/ rash  Heart: Regular rate and rhythm, S1S2 present or without murmur or extra heart sounds  Abdomen: obese, bowel sounds normoactive, tympanic, abdomen is soft without significant tenderness, masses, organomegaly or guarding  Extremity: negative for bl LE edema, cyanosis, clubbing  Neuro: alert, oriented x 3, no defects noted in general exam.  Skin: Skin color, texture, turgor normal. No rashes or lesions    Labs Reviewed:  Recent Results (from the past 24 hour(s))   POC LACTIC ACID    Collection Time: 02/08/17  1:53 PM   Result Value Ref Range    Lactic Acid (POC) 4.2 (HH) 0.4 - 2.0 mmol/L   URINALYSIS W/ RFLX MICROSCOPIC    Collection Time: 02/08/17  3:30 PM   Result Value Ref Range    Color YELLOW      Appearance CLEAR      Specific gravity 1.026 1.005 - 1.030      pH (UA) 5.0 5.0 - 8.0      Protein NEGATIVE  NEG mg/dL    Glucose >1000 (A) NEG mg/dL    Ketone TRACE (A) NEG mg/dL    Bilirubin NEGATIVE  NEG      Blood NEGATIVE  NEG      Urobilinogen 0.2 0.2 - 1.0 EU/dL    Nitrites NEGATIVE  NEG      Leukocyte Esterase NEGATIVE  NEG     CULTURE, BLOOD    Collection Time: 02/08/17  4:14 PM   Result Value Ref Range    Special Requests: NO SPECIAL REQUESTS      Culture result: NO GROWTH AFTER 12 HOURS     CULTURE, BLOOD    Collection Time: 02/08/17  4:45 PM   Result Value Ref Range    Special Requests: NO SPECIAL REQUESTS      Culture result: NO GROWTH AFTER 12 HOURS     POC LACTIC ACID    Collection Time: 02/08/17  6:00 PM   Result Value Ref Range    Lactic Acid (POC) 5.1 (HH) 0.4 - 2.0 mmol/L   GLUCOSE, POC    Collection Time: 02/08/17  6:02 PM   Result Value Ref Range    Glucose (POC) 284 (H) 70 - 110 mg/dL   HEPATIC FUNCTION PANEL    Collection Time: 02/08/17 10:00 PM   Result Value Ref Range    Protein, total 7.5 6.4 - 8.2 g/dL    Albumin 3.5 3.4 - 5.0 g/dL    Globulin 4.0 2.0 - 4.0 g/dL    A-G Ratio 0.9 0.8 - 1.7      Bilirubin, total 0.6 0.2 - 1.0 MG/DL    Bilirubin, direct <0.1 0.0 - 0.2 MG/DL    Alk. phosphatase 81 45 - 117 U/L    AST (SGOT) 16 15 - 37 U/L    ALT (SGPT) 52 13 - 56 U/L   METABOLIC PANEL, BASIC    Collection Time: 02/08/17 10:00 PM   Result Value Ref Range    Sodium 139 136 - 145 mmol/L    Potassium 4.0 3.5 - 5.5 mmol/L    Chloride 103 100 - 108 mmol/L    CO2 26 21 - 32 mmol/L    Anion gap 10 3.0 - 18 mmol/L    Glucose 242 (H) 74 - 99 mg/dL    BUN 11 7.0 - 18 MG/DL    Creatinine 1.00 0.6 - 1.3 MG/DL    BUN/Creatinine ratio 11 (L) 12 - 20      GFR est AA >60 >60 ml/min/1.73m2    GFR est non-AA 57 (L) >60 ml/min/1.73m2    Calcium 9.4 8.5 - 10.1 MG/DL   GLUCOSE, POC    Collection Time: 02/08/17 10:05 PM   Result Value Ref Range    Glucose (POC) 239 (H) 70 - 110 mg/dL   POC G3    Collection Time: 02/08/17 10:16 PM   Result Value Ref Range    Device: ROOM AIR      FIO2 (POC) 21 %    pH (POC) 7.370 7.35 - 7.45      pCO2 (POC) 40.7 35.0 - 45.0 MMHG    pO2 (POC) 66 (L) 80 - 100 MMHG    HCO3 (POC) 23.5 22 - 26 MMOL/L    sO2 (POC) 92 92 - 97 %    Base deficit (POC) 2 mmol/L    Allens test (POC) YES      Total resp. rate 26      Site RIGHT RADIAL      Patient temp.  37.0      Specimen type (POC) ARTERIAL      Performed by Bejna Cabral    POC LACTIC ACID    Collection Time: 02/08/17 10:17 PM   Result Value Ref Range    Lactic Acid (POC) 3.3 (HH) 0.4 - 2.0 mmol/L   CBC WITH AUTOMATED DIFF    Collection Time: 02/09/17  4:10 AM   Result Value Ref Range    WBC 9.3 4.6 - 13.2 K/uL    RBC 4.17 (L) 4.20 - 5.30 M/uL    HGB 12.8 12.0 - 16.0 g/dL    HCT 37.8 35.0 - 45.0 %    MCV 90.6 74.0 - 97.0 FL    MCH 30.7 24.0 - 34.0 PG    MCHC 33.9 31.0 - 37.0 g/dL    RDW 14.0 11.6 - 14.5 %    PLATELET 414 960 - 703 K/uL    MPV 9.4 9.2 - 11.8 FL    NEUTROPHILS 91 (H) 40 - 73 %    LYMPHOCYTES 4 (L) 21 - 52 %    MONOCYTES 5 3 - 10 %    EOSINOPHILS 0 0 - 5 %    BASOPHILS 0 0 - 2 %    ABS. NEUTROPHILS 8.4 (H) 1.8 - 8.0 K/UL    ABS. LYMPHOCYTES 0.4 (L) 0.9 - 3.6 K/UL    ABS. MONOCYTES 0.4 0.05 - 1.2 K/UL    ABS. EOSINOPHILS 0.0 0.0 - 0.4 K/UL    ABS.  BASOPHILS 0.0 0.0 - 0.1 K/UL    DF AUTOMATED     METABOLIC PANEL, BASIC    Collection Time: 02/09/17  4:10 AM   Result Value Ref Range    Sodium 139 136 - 145 mmol/L    Potassium 4.3 3.5 - 5.5 mmol/L    Chloride 102 100 - 108 mmol/L    CO2 26 21 - 32 mmol/L    Anion gap 11 3.0 - 18 mmol/L    Glucose 262 (H) 74 - 99 mg/dL    BUN 11 7.0 - 18 MG/DL    Creatinine 0.99 0.6 - 1.3 MG/DL    BUN/Creatinine ratio 11 (L) 12 - 20      GFR est AA >60 >60 ml/min/1.73m2    GFR est non-AA 58 (L) >60 ml/min/1.73m2    Calcium 9.4 8.5 - 10.1 MG/DL   CARDIAC PANEL,(CK, CKMB & TROPONIN)    Collection Time: 02/09/17  4:10 AM   Result Value Ref Range    CK 50 26 - 192 U/L    CK - MB 0.8 0.5 - 3.6 ng/ml    CK-MB Index 1.6 0.0 - 4.0 %    Troponin-I, Qt. <0.02 0.0 - 0.045 NG/ML   EKG, 12 LEAD, INITIAL    Collection Time: 02/09/17  5:17 AM   Result Value Ref Range    Ventricular Rate 107 BPM    Atrial Rate 107 BPM    P-R Interval 170 ms    QRS Duration 90 ms    Q-T Interval 346 ms    QTC Calculation (Bezet) 461 ms    Calculated P Axis 54 degrees    Calculated R Axis 35 degrees    Calculated T Axis 39 degrees    Diagnosis       Sinus tachycardia  Nonspecific T wave abnormality  Abnormal ECG  When compared with ECG of 08-FEB-2017 08:08,  No significant change was found  Confirmed by Malissa Garner ([id number]) on 2/9/2017 8:11:59 AM     GLUCOSE, POC    Collection Time: 02/09/17  8:55 AM   Result Value Ref Range    Glucose (POC) 228 (H) 70 - 110 mg/dL   GLUCOSE, POC    Collection Time: 02/09/17 11:43 AM   Result Value Ref Range    Glucose (POC) 242 (H) 70 - 110 mg/dL   CARDIAC PANEL,(CK, CKMB & TROPONIN)    Collection Time: 02/09/17 11:51 AM   Result Value Ref Range    CK 56 26 - 192 U/L    CK - MB <0.5 (L) 0.5 - 3.6 ng/ml    CK-MB Index CANNOT BE CALCULATED 0.0 - 4.0 %    Troponin-I, Qt. <0.02 0.0 - 0.045 NG/ML   LACTIC ACID, PLASMA    Collection Time: 02/09/17 11:51 AM   Result Value Ref Range    Lactic acid 3.9 (HH) 0.4 - 2.0 MMOL/L           Recent Labs      02/08/17   2216   FIO2I  21   HCO3I  23.5   PCO2I  40.7   PHI  7.370   PO2I  66*       All Micro Results     Procedure Component Value Units Date/Time    CULTURE, BLOOD [[id number]] Collected:  02/08/17 1645    Order Status:  Completed Specimen:  Whole Blood from Blood Updated:  02/09/17 0832     Special Requests: NO SPECIAL REQUESTS        Culture result: NO GROWTH AFTER 12 HOURS       CULTURE, BLOOD [[id number]] Collected:  02/08/17 1614    Order Status:  Completed Specimen:  Whole Blood from Blood Updated:  02/09/17 0832     Special Requests: NO SPECIAL REQUESTS        Culture result: NO GROWTH AFTER 12 HOURS             5/29/16 PFT   PULMONARY FUNCTIONS TEST:  30 pack year smoker, Sob  Good Effort and reproducibility. Impression:  Low FEV1 ratio so evidence of obstructive disease. FEV1 of 1.32 or 51% predicted  Significant  bronchodilator response. Normal TLC so  evidence of restrictive disease. TLC of 111% predicted. Reduction in DLCO suggest  abnormality in gas exchange related to differential including parenchymal lung disease, isolated reduction can be seen in anemia, chf or pulmonary vascular disease correlate clinically. DLCO corrected to [place] is normal.  RV/TLC elevated suggest hyperinflation. PFT consistent with moderate to severe obstructive airway disease with hyper inflation and mild reduction in DLCO. Overall looks like severe COPD with significant bronchodilator response. Possible ACOS <Asthma COPD Overlap Syndrome>.   Flows:  FEV 1% is reduced/normal  FVC 1.93 or 59% Predicated LLN  Post BD 2.54 or 77% Predicated 31 % Change  FEV1 1.04 or 40% Predicated LLN  Post BD 1.32 or 51% Predicated  27 % Change  FEV1/FVC 54  Volumes:  Normal Volumes  TLC 5.66 or 111 % Predicted   RV/TLC 64 or 181 % Predicted   ERV 0.26 or 31 % Predicted LLN   Flow Volume Loop:  Nonspecific obstructive pattern in Flow Volume Loop  Bronchodilator:  Significant improvement with bronchodilator  Diffusion:   DLCO 15.97 or 69 % Predicted LLN   DLCO/VA 4.45 or 96 % Predicted  Comment:  Failure to respond to inhaled bronchodilator should not preclude a clinical trial of therapy  Please see scanned PFT raw data in patient's chart. Imaging:  [x]I have personally reviewed the patients chest radiographs images and report with the patient    Results from [place] encounter on 02/08/17   XR CHEST PORT   Narrative Procedure:  Chest portable. Indication:  Shortness of breath. Comparison:  9/18/16, 6/3/15. Findings: An obliquely oriented bandlike density is identified in the right  lung base. A similar density was noted on 9/18/2016 and 6/3/2015. A focus of  hazy density with circumscribed margins is identified in the left lower lung  zone adjacent to the cardiac apex. This density is found to be related to a  pericardial fat pad on 6/3/15. No new infiltrate or consolidation is  identified. The cardiac silhouette is mildly prominent. Impression Impression:    1. Stable bilateral lower lung zone densities. 2.  No new infiltrate or consolidation. [x]See my orders for details    My assessment, plan of care, findings, medications, side effects etc were discussed with:  [x]nursing []PT/OT    [x]respiratory therapy []Dr. Kailyn Chavis [x]Patient     [x]Total care time exclusive of procedures 60 minutes with complex decision making performed and > 50% time spent in face to face consultation.     Hector Giron MD

## 2017-02-09 NOTE — PROGRESS NOTES
Intern Progress Note  [facility]       Patient: Tino Glass MRN: [id number]  CSN: [id number]    YOB: 1960  Age: 61 y.o. Sex: female    DOA: 2/8/2017 LOS:  LOS: 1 day   PCP: Ino Caputo MD                 Subjective:     Acute events: No acute events overnight. Patient is still very dyspneic. Can not get more than a few words out without having to catch her breath. She states that the coughing has not gotten any better. Brief ROS: Endorses cough and SOB.  Denies cp, fever, chills, N/V/D    Objective:      Patient Vitals for the past 24 hrs:   Temp Pulse Resp BP SpO2   02/09/17 0600 - 96 24 124/51 95 %   02/09/17 0530 - (!) 102 20 117/48 93 %   02/09/17 0500 - (!) 106 24 134/63 94 %   02/09/17 0415 - (!) 109 (!) 31 150/69 97 %   02/09/17 0345 - 93 23 144/75 94 %   02/09/17 0315 - 94 23 148/78 94 %   02/09/17 0245 - (!) 103 21 169/85 94 %   02/09/17 0215 - (!) 104 30 165/72 96 %   02/09/17 0145 - (!) 101 25 156/61 92 %   02/09/17 0100 - (!) 104 23 137/66 90 %   02/09/17 0030 - (!) 108 26 150/69 92 %   02/09/17 0000 - (!) 108 27 148/57 94 %   02/08/17 2330 - 100 30 148/72 93 %   02/08/17 2300 - 99 25 137/69 92 %   02/08/17 2215 - 99 25 143/73 94 %   02/08/17 2145 - (!) 102 21 146/59 91 %   02/08/17 2115 - (!) 101 23 111/52 90 %   02/08/17 2045 - (!) 104 28 128/61 91 %   02/08/17 2015 - (!) 103 25 149/49 92 %   02/08/17 1945 - 93 25 129/52 92 %   02/08/17 1915 - 92 23 136/66 93 %   02/08/17 1830 - 87 23 128/57 93 %   02/08/17 1800 - 99 27 133/56 92 %   02/08/17 1730 - (!) 101 29 137/62 93 %   02/08/17 1700 - 99 25 139/62 93 %   02/08/17 1630 - (!) 101 28 138/51 93 %   02/08/17 1530 - - - 144/61 94 %   02/08/17 1500 - - - 139/65 98 %   02/08/17 0930 - - - 124/55 92 %   02/08/17 0900 - - - 137/60 90 %   02/08/17 0830 - - - 131/59 93 %   02/08/17 0804 98.1 °F (36.7 °C) (!) 102 16 136/72 99 %   02/08/17 0800 - - - 145/61 90 %   02/08/17 0730 - - - 152/72 93 % Physical Exam:   General: alert, well developed, well nourished, cooperative and in no apparent distress  Head: NC/AT  Cardiovascular: RRR s MRGs  Respiratory: Poor respiratory effort, diffuse inspiratory and expiratory wheezes   Abdomen: Soft, +BS, non-TTP, Non-Distended  Extremities: No edema in lower extremities bilaterally, 2+ radial pulses intact bilaterally  Neuro: Cranial nerves grossly intact, grossly moving upper and lower extremities    Lab/Data Reviewed:    CBC w/Diff Recent Labs      02/09/17 0410 02/08/17   0745   WBC  9.3  8.4   RBC  4.17*  4.35   HGB  12.8  13.3   HCT  37.8  38.7   PLT  267  226   GRANS  91*  75*   LYMPH  4*  18*   EOS  0  1      Chemistry Recent Labs      02/09/17 0410 02/08/17 2205 02/08/17 2200 02/08/17   0745   GLUCPOC   --   239*   --    < >   --    GLU  262*   --   242*   --   245*   NA  139   --   139   --   139   K  4.3   --   4.0   --   3.5   CL  102   --   103   --   100   CO2  26   --   26   --   30   BUN  11   --   11   --   11   CREA  0.99   --   1.00   --   1.03   CA  9.4   --   9.4   --   9.4   AGAP  11   --   10   --   9   BUCR  11*   --   11*   --   11*   AP   --    --   81   --    --    TP   --    --   7.5   --    --    ALB   --    --   3.5   --    --    GLOB   --    --   4.0   --    --    AGRAT   --    --   0.9   --    --     < > = values in this interval not displayed. Microbiology No results for input(s): SDES, CULT in the last 72 hours. No results for input(s): CULT in the last 72 hours.    I/O No intake or output data in the 24 hours ending 02/09/17 0716       Scheduled Medications Reviewed:  Current Facility-Administered Medications   Medication Dose Route Frequency    albuterol-ipratropium (DUO-NEB) 2.5 MG-0.5 MG/3 ML  3 mL Nebulization Q3H RT    guaiFENesin SR (MUCINEX) tablet 600 mg  600 mg Oral BID    enoxaparin (LOVENOX) injection 40 mg  40 mg SubCUTAneous Q24H    levoFLOXacin (LEVAQUIN) 750 mg in D5W IVPB  750 mg IntraVENous Q24H    methylPREDNISolone (PF) (SOLU-MEDROL) injection 40 mg  40 mg IntraVENous Q8H    aspirin delayed-release tablet 81 mg  81 mg Oral DAILY    budesonide-formoterol (SYMBICORT) 80-4.5 mcg inhaler  2 Puff Inhalation BID RT    pantoprazole (PROTONIX) tablet 40 mg  40 mg Oral ACB    lisinopril (PRINIVIL, ZESTRIL) tablet 40 mg  40 mg Oral DAILY    montelukast (SINGULAIR) tablet 10 mg  10 mg Oral DAILY    insulin lispro (HUMALOG) injection   SubCUTAneous Q6H    * Please Update Patient's Weight/Height  1 Each Other NOW       Imaging, microbiology, and EKG/Telemetry:  CXR (2/9/2017) - Impression:1. Stable bilateral lower lung zone densities. 2. No new infiltrate or consolidation. Assessment/Plan     61 y.o. female with history of COPD/Asthma, DMII, HTN, and GERD admitted with acute COPD exacerbation.  Patient has been treatment with solumedrol and albuterol, but still has increased work of breathing.       Acute COPD exacerbation concomitant w Asthma:  - Continue solumedrol 40 mg q8h  - Duonebs q3h; will increase frequency if necessary  - Levaquin 750 mg daily  - Monitor lactic acidosis  - Supplemental oxygen as needed  - Advair HFA 2 puffs BID     DMII:  - Holding metformin  - Accuchecks achs, SSI     HTN:  - Continue lisinopril 40 mg daily     GERD:  - Continue famotidine and omeprazole (substituted for lansoprazole)      Diet: Diabtetic  DVT Prophylaxis: Lovenox, SCDs  Code Status: FULL  Point of Contact: Enid Yu, Daughter, [phone]     Disposition and anticipated LOS: 2+ midnights    Sachi Lee DO PGY-1  2/9/2017, 7:16 AM

## 2017-02-09 NOTE — ED NOTES
TRANSFER - OUT REPORT:    Verbal report given to JOSÉ MIGUEL Bhatti(name) on Edna Mendes  being transferred to ICU(unit) for routine progression of care       Report consisted of patients Situation, Background, Assessment and   Recommendations(SBAR). Information from the following report(s) SBAR, ED Summary and MAR was reviewed with the receiving nurse. Lines:   Peripheral IV 02/08/17 Left Arm (Active)   Site Assessment Clean, dry, & intact 2/8/2017  4:19 PM   Phlebitis Assessment 0 2/8/2017  4:19 PM   Infiltration Assessment 0 2/8/2017  4:19 PM   Dressing Status Clean, dry, & intact 2/8/2017  4:19 PM   Dressing Type Tape;Transparent 2/8/2017  4:19 PM   Hub Color/Line Status Pink;Flushed;Patent 2/8/2017  4:19 PM   Action Taken Blood drawn 2/8/2017  4:19 PM        Opportunity for questions and clarification was provided.       Patient transported with:   Monitor  Registered Nurse

## 2017-02-10 LAB
ANION GAP BLD CALC-SCNC: 9 MMOL/L (ref 3–18)
ARTERIAL PATENCY WRIST A: YES
BASE EXCESS BLD CALC-SCNC: 3 MMOL/L
BASOPHILS # BLD AUTO: 0 K/UL (ref 0–0.1)
BASOPHILS # BLD: 0 % (ref 0–2)
BDY SITE: ABNORMAL
BUN SERPL-MCNC: 18 MG/DL (ref 7–18)
BUN/CREAT SERPL: 19 (ref 12–20)
CALCIUM SERPL-MCNC: 9.8 MG/DL (ref 8.5–10.1)
CHLORIDE SERPL-SCNC: 100 MMOL/L (ref 100–108)
CO2 SERPL-SCNC: 27 MMOL/L (ref 21–32)
CREAT SERPL-MCNC: 0.97 MG/DL (ref 0.6–1.3)
D DIMER PPP FEU-MCNC: <0.27 UG/ML(FEU)
DIFFERENTIAL METHOD BLD: ABNORMAL
EOSINOPHIL # BLD: 0 K/UL (ref 0–0.4)
EOSINOPHIL NFR BLD: 0 % (ref 0–5)
ERYTHROCYTE [DISTWIDTH] IN BLOOD BY AUTOMATED COUNT: 13.8 % (ref 11.6–14.5)
GAS FLOW.O2 O2 DELIVERY SYS: ABNORMAL L/MIN
GLUCOSE BLD STRIP.AUTO-MCNC: 186 MG/DL (ref 70–110)
GLUCOSE BLD STRIP.AUTO-MCNC: 197 MG/DL (ref 70–110)
GLUCOSE BLD STRIP.AUTO-MCNC: 234 MG/DL (ref 70–110)
GLUCOSE BLD STRIP.AUTO-MCNC: 246 MG/DL (ref 70–110)
GLUCOSE BLD STRIP.AUTO-MCNC: 276 MG/DL (ref 70–110)
GLUCOSE BLD STRIP.AUTO-MCNC: 318 MG/DL (ref 70–110)
GLUCOSE SERPL-MCNC: 280 MG/DL (ref 74–99)
HCO3 BLD-SCNC: 28.3 MMOL/L (ref 22–26)
HCT VFR BLD AUTO: 36.7 % (ref 35–45)
HGB BLD-MCNC: 12.2 G/DL (ref 12–16)
LACTATE SERPL-SCNC: 1.9 MMOL/L (ref 0.4–2)
LYMPHOCYTES # BLD AUTO: 6 % (ref 21–52)
LYMPHOCYTES # BLD: 0.5 K/UL (ref 0.9–3.6)
MCH RBC QN AUTO: 30.6 PG (ref 24–34)
MCHC RBC AUTO-ENTMCNC: 33.2 G/DL (ref 31–37)
MCV RBC AUTO: 92 FL (ref 74–97)
MONOCYTES # BLD: 0.4 K/UL (ref 0.05–1.2)
MONOCYTES NFR BLD AUTO: 5 % (ref 3–10)
NEUTS SEG # BLD: 7.7 K/UL (ref 1.8–8)
NEUTS SEG NFR BLD AUTO: 89 % (ref 40–73)
PCO2 BLD: 48.8 MMHG (ref 35–45)
PEEP RESPIRATORY: 6 CMH2O
PH BLD: 7.37 [PH] (ref 7.35–7.45)
PIP ISTAT,IPIP: 15
PLATELET # BLD AUTO: 291 K/UL (ref 135–420)
PMV BLD AUTO: 9.4 FL (ref 9.2–11.8)
PO2 BLD: 131 MMHG (ref 80–100)
POTASSIUM SERPL-SCNC: 4.4 MMOL/L (ref 3.5–5.5)
PRESSURE SUPPORT SETTING VENT: 8 CMH2O
RBC # BLD AUTO: 3.99 M/UL (ref 4.2–5.3)
SAO2 % BLD: 99 % (ref 92–97)
SERVICE CMNT-IMP: 40 L/MIN
SERVICE CMNT-IMP: ABNORMAL
SODIUM SERPL-SCNC: 136 MMOL/L (ref 136–145)
SPECIMEN TYPE: ABNORMAL
SPONTANEOUS TIMED, IST: YES
WBC # BLD AUTO: 8.6 K/UL (ref 4.6–13.2)

## 2017-02-10 PROCEDURE — 36600 WITHDRAWAL OF ARTERIAL BLOOD: CPT

## 2017-02-10 PROCEDURE — 74011250636 HC RX REV CODE- 250/636: Performed by: FAMILY MEDICINE

## 2017-02-10 PROCEDURE — 82803 BLOOD GASES ANY COMBINATION: CPT

## 2017-02-10 PROCEDURE — 80048 BASIC METABOLIC PNL TOTAL CA: CPT | Performed by: FAMILY MEDICINE

## 2017-02-10 PROCEDURE — 65660000000 HC RM CCU STEPDOWN

## 2017-02-10 PROCEDURE — 85379 FIBRIN DEGRADATION QUANT: CPT | Performed by: FAMILY MEDICINE

## 2017-02-10 PROCEDURE — 74011250637 HC RX REV CODE- 250/637: Performed by: FAMILY MEDICINE

## 2017-02-10 PROCEDURE — 82962 GLUCOSE BLOOD TEST: CPT

## 2017-02-10 PROCEDURE — 74011636637 HC RX REV CODE- 636/637: Performed by: FAMILY MEDICINE

## 2017-02-10 PROCEDURE — 85025 COMPLETE CBC W/AUTO DIFF WBC: CPT | Performed by: FAMILY MEDICINE

## 2017-02-10 PROCEDURE — 74011000250 HC RX REV CODE- 250: Performed by: INTERNAL MEDICINE

## 2017-02-10 PROCEDURE — 94640 AIRWAY INHALATION TREATMENT: CPT

## 2017-02-10 PROCEDURE — 83605 ASSAY OF LACTIC ACID: CPT | Performed by: FAMILY MEDICINE

## 2017-02-10 PROCEDURE — 36415 COLL VENOUS BLD VENIPUNCTURE: CPT | Performed by: FAMILY MEDICINE

## 2017-02-10 PROCEDURE — 74011250636 HC RX REV CODE- 250/636: Performed by: INTERNAL MEDICINE

## 2017-02-10 PROCEDURE — 93970 EXTREMITY STUDY: CPT

## 2017-02-10 RX ORDER — INSULIN LISPRO 100 [IU]/ML
INJECTION, SOLUTION INTRAVENOUS; SUBCUTANEOUS
Status: DISCONTINUED | OUTPATIENT
Start: 2017-02-10 | End: 2017-02-14 | Stop reason: HOSPADM

## 2017-02-10 RX ORDER — INSULIN GLARGINE 100 [IU]/ML
30 INJECTION, SOLUTION SUBCUTANEOUS DAILY
Status: DISCONTINUED | OUTPATIENT
Start: 2017-02-10 | End: 2017-02-11

## 2017-02-10 RX ORDER — INSULIN LISPRO 100 [IU]/ML
10 INJECTION, SOLUTION INTRAVENOUS; SUBCUTANEOUS
Status: DISCONTINUED | OUTPATIENT
Start: 2017-02-10 | End: 2017-02-11

## 2017-02-10 RX ADMIN — LEVOFLOXACIN 750 MG: 5 INJECTION, SOLUTION INTRAVENOUS at 14:57

## 2017-02-10 RX ADMIN — GUAIFENESIN 600 MG: 600 TABLET, EXTENDED RELEASE ORAL at 18:00

## 2017-02-10 RX ADMIN — METHYLPREDNISOLONE SODIUM SUCCINATE 40 MG: 125 INJECTION, POWDER, FOR SOLUTION INTRAMUSCULAR; INTRAVENOUS at 06:35

## 2017-02-10 RX ADMIN — INSULIN LISPRO 10 UNITS: 100 INJECTION, SOLUTION INTRAVENOUS; SUBCUTANEOUS at 11:15

## 2017-02-10 RX ADMIN — ENOXAPARIN SODIUM 40 MG: 100 INJECTION SUBCUTANEOUS at 17:58

## 2017-02-10 RX ADMIN — METHYLPREDNISOLONE SODIUM SUCCINATE 40 MG: 125 INJECTION, POWDER, FOR SOLUTION INTRAMUSCULAR; INTRAVENOUS at 18:01

## 2017-02-10 RX ADMIN — ASPIRIN 81 MG: 81 TABLET, COATED ORAL at 09:46

## 2017-02-10 RX ADMIN — BUDESONIDE 500 MCG: 0.5 INHALANT RESPIRATORY (INHALATION) at 20:10

## 2017-02-10 RX ADMIN — PANTOPRAZOLE SODIUM 40 MG: 40 TABLET, DELAYED RELEASE ORAL at 09:46

## 2017-02-10 RX ADMIN — INSULIN LISPRO 10 UNITS: 100 INJECTION, SOLUTION INTRAVENOUS; SUBCUTANEOUS at 17:57

## 2017-02-10 RX ADMIN — INSULIN LISPRO 9 UNITS: 100 INJECTION, SOLUTION INTRAVENOUS; SUBCUTANEOUS at 09:47

## 2017-02-10 RX ADMIN — GUAIFENESIN 600 MG: 600 TABLET, EXTENDED RELEASE ORAL at 09:46

## 2017-02-10 RX ADMIN — INSULIN LISPRO 3 UNITS: 100 INJECTION, SOLUTION INTRAVENOUS; SUBCUTANEOUS at 14:11

## 2017-02-10 RX ADMIN — INSULIN LISPRO 3 UNITS: 100 INJECTION, SOLUTION INTRAVENOUS; SUBCUTANEOUS at 22:29

## 2017-02-10 RX ADMIN — INSULIN LISPRO 6 UNITS: 100 INJECTION, SOLUTION INTRAVENOUS; SUBCUTANEOUS at 17:58

## 2017-02-10 RX ADMIN — LISINOPRIL 40 MG: 20 TABLET ORAL at 09:46

## 2017-02-10 RX ADMIN — BUDESONIDE 500 MCG: 0.5 INHALANT RESPIRATORY (INHALATION) at 08:34

## 2017-02-10 RX ADMIN — MONTELUKAST SODIUM 10 MG: 10 TABLET, FILM COATED ORAL at 22:30

## 2017-02-10 RX ADMIN — INSULIN GLARGINE 30 UNITS: 100 INJECTION, SOLUTION SUBCUTANEOUS at 11:14

## 2017-02-10 RX ADMIN — ARFORMOTEROL TARTRATE 15 MCG: 15 SOLUTION RESPIRATORY (INHALATION) at 20:10

## 2017-02-10 RX ADMIN — ARFORMOTEROL TARTRATE 15 MCG: 15 SOLUTION RESPIRATORY (INHALATION) at 08:34

## 2017-02-10 RX ADMIN — METHYLPREDNISOLONE SODIUM SUCCINATE 40 MG: 125 INJECTION, POWDER, FOR SOLUTION INTRAMUSCULAR; INTRAVENOUS at 12:33

## 2017-02-10 NOTE — PROGRESS NOTES
Care Management Interventions  PCP Verified by CM: Yes  Transition of Care Consult (CM Consult): [address]: Yes  Current Support Network: Relative's Home, Family Lives Nearby  Confirm Follow Up Transport: Family  Plan discussed with Pt/Family/Caregiver: Yes  Freedom of Choice Offered: Yes (For home health)  Discharge Location  Discharge Placement: Home with home health     CM met with pt in room regarding [address] for home health. Pt chooses [address]. Referral submitted. Pt also request for bedside commode. Awaiting order to submit to DME agency.

## 2017-02-10 NOTE — PROCEDURES
[facility]  [facility] ***    Name: Jesenia Croft  MRN: [id number]    Inpatient  : 21 Aug 1960  HIS Order #: [id number]  [address] [facility] Visit #: [id number]  Date: 10 Feb 2017    TYPE OF TEST: Peripheral Venous Testing    REASON FOR TEST  Shortness of breath    Right Leg:-  Deep venous thrombosis:           No  Superficial venous thrombosis:    No  Deep venous insufficiency:        Not examined  Superficial venous insufficiency: Not examined    Left Leg:-  Deep venous thrombosis:           No  Superficial venous thrombosis:    No  Deep venous insufficiency:        Not examined  Superficial venous insufficiency: Not examined      INTERPRETATION/FINDINGS  Duplex images were obtained using 2-D gray scale, color flow, and  spectral Doppler analysis. Right leg :  1. Deep veins visualized include the common femoral, femoral,  popliteal, posterior tibial and peroneal veins. 2. No evidence of deep venous thrombosis detected in the veins  visualized. 3. Superficial veins visualized include the great saphenous vein. 4. No evidence of superficial thrombosis detected. 5. Normal multiphasic flow in the posterior tibial arteries. Left leg :  1. Deep veins visualized include the common femoral, femoral,  popliteal, posterior tibial and peroneal veins. 2. No evidence of deep venous thrombosis detected in the veins  visualized. 3. Superficial veins visualized include the great saphenous vein. 4. No evidence of superficial thrombosis detected. 5. Normal multiphasic flow in the posterior tibial arteries. ADDITIONAL COMMENTS    I have personally reviewed the data relevant to the interpretation of  this  study. TECHNOLOGIST: SOCO Alaniz RVS  Signed: 02/10/2017 01:49 PM    PHYSICIAN: Imer Hampton D.O.   Signed: 02/10/2017 07:54 PM

## 2017-02-10 NOTE — PROGRESS NOTES
Care Management Interventions  PCP Verified by CM: Yes  Current Support Network: Relative's Home, Family Lives Nearby  Confirm Follow Up Transport: Family  Plan discussed with Pt/Family/Caregiver: Yes     Pt is a 61year old female admitted for COPD exacerbation. Pt is alert and oriented and alone in room. Pt states that she resides in the home with her son and his family. Pt's plan upon discharge is to stay with her daughter Romain Thayer ([phone]). Pt's daughter will assist her with transportation. Pt indicates being somewhat independent with min assist with self care, and she reports using a walker to assist with ambulation. Pt mentions that she had been experiencing SOB when walking. PT has no questions or concerns at this time. Pt is requesting for a bedside commode for home use. CM contacted [facility] to inform and to request order for DME.

## 2017-02-10 NOTE — ROUTINE PROCESS
TRANSFER - OUT REPORT:    Verbal report given to hermila kennedy rn(name) on [facility]  being transferred to [facility](unit) for routine progression of care       Report consisted of patients Situation, Background, Assessment and   Recommendations(SBAR). Information from the following report(s) SBAR, ED Summary, Intake/Output, MAR, Recent Results and Cardiac Rhythm nsr was reviewed with the receiving nurse. Lines:   Peripheral IV 02/08/17 Left Arm (Active)   Site Assessment Clean, dry, & intact 2/10/2017  4:00 PM   Phlebitis Assessment 0 2/10/2017  4:00 PM   Infiltration Assessment 0 2/10/2017  4:00 PM   Dressing Status Clean, dry, & intact 2/10/2017  4:00 PM   Dressing Type Transparent 2/10/2017  8:00 AM   Hub Color/Line Status Pink 2/10/2017  8:00 AM   Action Taken Dressing reinforced 2/10/2017  4:00 AM   Alcohol Cap Used No 2/10/2017  4:00 AM       Peripheral IV 02/10/17 Right Antecubital (Active)   Site Assessment Clean, dry, & intact 2/10/2017  4:00 PM   Phlebitis Assessment 0 2/10/2017  4:00 PM   Infiltration Assessment 0 2/10/2017  4:00 PM   Dressing Status Clean, dry, & intact 2/10/2017  4:00 PM   Dressing Type Transparent 2/10/2017  8:00 AM   Hub Color/Line Status Pink 2/10/2017  8:00 AM   Action Taken Dressing reinforced 2/10/2017  4:00 AM   Alcohol Cap Used No 2/10/2017  4:00 AM        Opportunity for questions and clarification was provided.       Patient transported with:   O2 @ 4 liters  Registered Nurse  [facility]

## 2017-02-10 NOTE — NURSE NAVIGATOR
Name: Carlos Hope Age: 61 y.o.  Gender: female  Date: 2/10/2017    Date of Admission: 2/8/2017  7:09 AM    Admission Type: Emergency    Patient Active Problem List    Diagnosis Date Noted    Diastolic CHF, chronic ([facility] 02/09/2017    Diverticulosis 02/09/2017    COPD exacerbation ([facility] 02/08/2017    Acute exacerbation of COPD with asthma ([facility] 02/08/2017    Obesity, Class III, BMI 40-49.9 (morbid obesity) ([facility] 08/09/2016    Chest pain 08/09/2016    Dyspnea 08/09/2016    COPD with acute exacerbation ([facility] 05/24/2015    COPD (chronic obstructive pulmonary disease) (HCC) 03/27/2015    Allergic rhinitis 03/27/2015    Essential hypertension, benign 09/30/2012    Diabetes mellitus, type 2 ([facility] 09/30/2012    Esophageal reflux 09/30/2012    SHERRIE on CPAP      Fede Levine MD    Pulmonologist: Andrei Glass    Past Medical History   Diagnosis Date    Asthma     COPD     Cystocele, midline     [facility]     GERD (gastroesophageal reflux disease)     Hidradenitis suppurativa     Hyperlipidemia     Hypertension     SHERRIE on CPAP     Stress incontinence      Past Surgical History   Procedure Laterality Date    Hx cholecystectomy      Hx appendectomy      Pr breast surgery procedure unlisted       Right breast benign tumor removal    Hx dilation and curettage       Dysfunctional uterine bleeding, thought 2/2 fibroids    Hx tubal ligation       Current Facility-Administered Medications   Medication Dose Route Frequency    insulin glargine (LANTUS) injection 30 Units  30 Units SubCUTAneous DAILY    insulin lispro (HUMALOG) injection 10 Units  10 Units SubCUTAneous TIDAC    insulin lispro (HUMALOG) injection   SubCUTAneous AC&HS    guaiFENesin SR (MUCINEX) tablet 600 mg  600 mg Oral BID    albuterol-ipratropium (DUO-NEB) 2.5 MG-0.5 MG/3 ML  3 mL Nebulization Q4H PRN    arformoterol (BROVANA) neb solution 15 mcg  15 mcg Nebulization BID RT    budesonide (PULMICORT) 500 mcg/2 ml nebulizer suspension  500 mcg Nebulization BID RT    methylPREDNISolone (PF) (SOLU-MEDROL) injection 40 mg  40 mg IntraVENous Q6H    montelukast (SINGULAIR) tablet 10 mg  10 mg Oral QHS    enoxaparin (LOVENOX) injection 40 mg  40 mg SubCUTAneous Q24H    acetaminophen (TYLENOL) tablet 650 mg  650 mg Oral Q4H PRN    levoFLOXacin (LEVAQUIN) 750 mg in D5W IVPB  750 mg IntraVENous Q24H    glucose chewable tablet 16 g  4 Tab Oral PRN    glucagon (GLUCAGEN) injection 1 mg  1 mg IntraMUSCular PRN    dextrose (D50W) injection syrg 12.5-25 g  25-50 mL IntraVENous PRN    aspirin delayed-release tablet 81 mg  81 mg Oral DAILY    pantoprazole (PROTONIX) tablet 40 mg  40 mg Oral ACB    lisinopril (PRINIVIL, ZESTRIL) tablet 40 mg  40 mg Oral DAILY       Received Flu Vaccine for Current Season (usually Sept-March): Yes       Prior to admission patient woke up in respiratory distress.           Today patient was observed lying in bed with nurse at bedside    Current Oxygen therapy O2 Device: Nasal cannula O2 Sat (%): 96 % Resp Rate: 21    Labs-   Recent Results (from the past 24 hour(s))   LACTIC ACID, PLASMA    Collection Time: 02/09/17  3:20 PM   Result Value Ref Range    Lactic acid 3.8 (HH) 0.4 - 2.0 MMOL/L   GLUCOSE, POC    Collection Time: 02/09/17  5:09 PM   Result Value Ref Range    Glucose (POC) 250 (H) 70 - 110 mg/dL   CULTURE, RESPIRATORY/SPUTUM/BRONCH W GRAM STAIN    Collection Time: 02/09/17  5:11 PM   Result Value Ref Range    Special Requests: NO SPECIAL REQUESTS      GRAM STAIN >25 WBC/lpf      GRAM STAIN <10 EPI/lpf      GRAM STAIN MUCUS PRESENT      GRAM STAIN FEW  GRAM POSITIVE COCCI  IN PAIRS        GRAM STAIN RARE  GRAM POSITIVE RODS        GRAM STAIN RARE  GRAM NEGATIVE RODS        Culture result: MODERATE  NORMAL RESPIRATORY TOAN       CARDIAC PANEL,(CK, CKMB & TROPONIN)    Collection Time: 02/09/17  8:41 PM   Result Value Ref Range    CK 75 26 - 192 U/L    CK - MB 0.8 0.5 - 3.6 ng/ml CK-MB Index 1.1 0.0 - 4.0 %    Troponin-I, Qt. <0.02 0.0 - 0.045 NG/ML   LACTIC ACID, PLASMA    Collection Time: 02/09/17  8:41 PM   Result Value Ref Range    Lactic acid 3.4 (HH) 0.4 - 2.0 MMOL/L   GLUCOSE, POC    Collection Time: 02/09/17  9:44 PM   Result Value Ref Range    Glucose (POC) 264 (H) 70 - 110 mg/dL   GLUCOSE, POC    Collection Time: 02/09/17 11:23 PM   Result Value Ref Range    Glucose (POC) 245 (H) 70 - 110 mg/dL   POC G3    Collection Time: 02/10/17  4:54 AM   Result Value Ref Range    Device: BIPAP      pH (POC) 7.372 7.35 - 7.45      pCO2 (POC) 48.8 (H) 35.0 - 45.0 MMHG    pO2 (POC) 131 (H) 80 - 100 MMHG    HCO3 (POC) 28.3 (H) 22 - 26 MMOL/L    sO2 (POC) 99 (H) 92 - 97 %    Base excess (POC) 3 mmol/L    PEEP/CPAP (POC) 6 cmH2O    PIP (POC) 15      Pressure support 8 cmH2O    Allens test (POC) YES      Bleed In 40 L/min    Site LEFT RADIAL      Specimen type (POC) ARTERIAL      Performed by Cathy Montgomery     Spontaneous timed YES     CBC WITH AUTOMATED DIFF    Collection Time: 02/10/17  5:40 AM   Result Value Ref Range    WBC 8.6 4.6 - 13.2 K/uL    RBC 3.99 (L) 4.20 - 5.30 M/uL    HGB 12.2 12.0 - 16.0 g/dL    HCT 36.7 35.0 - 45.0 %    MCV 92.0 74.0 - 97.0 FL    MCH 30.6 24.0 - 34.0 PG    MCHC 33.2 31.0 - 37.0 g/dL    RDW 13.8 11.6 - 14.5 %    PLATELET 419 714 - 608 K/uL    MPV 9.4 9.2 - 11.8 FL    NEUTROPHILS 89 (H) 40 - 73 %    LYMPHOCYTES 6 (L) 21 - 52 %    MONOCYTES 5 3 - 10 %    EOSINOPHILS 0 0 - 5 %    BASOPHILS 0 0 - 2 %    ABS. NEUTROPHILS 7.7 1.8 - 8.0 K/UL    ABS. LYMPHOCYTES 0.5 (L) 0.9 - 3.6 K/UL    ABS. MONOCYTES 0.4 0.05 - 1.2 K/UL    ABS. EOSINOPHILS 0.0 0.0 - 0.4 K/UL    ABS.  BASOPHILS 0.0 0.0 - 0.1 K/UL    DF AUTOMATED     METABOLIC PANEL, BASIC    Collection Time: 02/10/17  5:40 AM   Result Value Ref Range    Sodium 136 136 - 145 mmol/L    Potassium 4.4 3.5 - 5.5 mmol/L    Chloride 100 100 - 108 mmol/L    CO2 27 21 - 32 mmol/L    Anion gap 9 3.0 - 18 mmol/L    Glucose 280 (H) 74 - 99 mg/dL    BUN 18 7.0 - 18 MG/DL    Creatinine 0.97 0.6 - 1.3 MG/DL    BUN/Creatinine ratio 19 12 - 20      GFR est AA >60 >60 ml/min/1.73m2    GFR est non-AA 59 (L) >60 ml/min/1.73m2    Calcium 9.8 8.5 - 10.1 MG/DL   LACTIC ACID, PLASMA    Collection Time: 02/10/17  5:40 AM   Result Value Ref Range    Lactic acid 1.9 0.4 - 2.0 MMOL/L   D DIMER    Collection Time: 02/10/17  5:40 AM   Result Value Ref Range    D DIMER <0.27 <0.46 ug/ml(FEU)   GLUCOSE, POC    Collection Time: 02/10/17  8:52 AM   Result Value Ref Range    Glucose (POC) 276 (H) 70 - 110 mg/dL   GLUCOSE, POC    Collection Time: 02/10/17 11:07 AM   Result Value Ref Range    Glucose (POC) 318 (H) 70 - 110 mg/dL       No results found for: PO2, PCO2            Tests/Procedures resulted:  o Chest xray  o EKG  o Arterial Blood Gas  o Sputum culture    Recommendations: Bedside commode and Home Health set upon discharge. Education reinforced, patient and/or family given opportunity to ask questions. Educational folder given and reviewed with patient, reinforced medication compliance, follow up appointments, breathing techniques, infection prevention and avoidance of lung irritants. Patient stated that she mostly stays with her daughter and sleeps in a reclining chair, to go to the bathroom she has to climb 14 stairs. Patient interested in [facility] upon discharge and a bed side commode for home use.       Priti Sanchez RN

## 2017-02-10 NOTE — DIABETES MGMT
NUTRITIONAL ASSESSMENT AND  PLAN OF CARE     Marizol Sanchez           61 y.o.           2/8/2017                 1. COPD exacerbation (HCC)    2. Elevated lactic acid level       INTERVENTIONS/PLAN:   Continue basal, prandial, and correctional insulin coverage. Adjust as needed to meet glucose targets. Diabetes education     ASSESSMENT:   Pt is a 61year old female with a past medical history significant for type 2 diabetes, hypertension, asthma, COPD, and GERD. Pt is 217% ideal body weight. Blood glucose elevated above targets. Noted addition of basal and prandial insulin coverage today. Pt reports only taking Metformin at home. Stated blood glucose has been running high for a while related to steroids. Pt follows up regularly with PCP. Pt accepted information on free outpatient diabetes education classes.     Diabetes Management:   Recent blood glucose:   250, 264, 245, 276, 318 mg/dL   Within target range (non-ICU: <140; ICU<180): [] Yes   [x]  No    Current Insulin regimen:   Lantus insulin 30 units daily   Prandial Lispro insulin 10 units TID before meals  Correctional Lispro insulin 4 times daily ACHS (very insulin resistant scale)  Home medication/insulin regimen:   Metformin   HbA1c: 8.0% (estimated average glucose of 183 mg/dL)  Adequate glycemic control PTA:  [] Yes  [x] No     SUBJECTIVE/OBJECTIVE:   Information obtained from: chart review, patient    Diet: Diabetic, Consistent Carbohydrate, 1.5 gram NA, High Fiber    Patient Vitals for the past 100 hrs:   % Diet Eaten   02/09/17 1720 100 %   02/09/17 1200 75 %   02/09/17 0900 100 %     Medications: [x]   Reviewed     Most Recent POC Glucose: Recent Labs      02/10/17   0540  02/09/17   0410  02/08/17   2200  02/08/17   0745   GLU  280*  262*  242*  245*      Labs:   Lab Results   Component Value Date/Time    Hemoglobin A1c 8.0 02/08/2017 07:45 AM     Lab Results   Component Value Date/Time    Sodium 136 02/10/2017 05:40 AM    Potassium 4.4 02/10/2017 05:40 AM    Chloride 100 02/10/2017 05:40 AM    CO2 27 02/10/2017 05:40 AM    Anion gap 9 02/10/2017 05:40 AM    Glucose 280 02/10/2017 05:40 AM    BUN 18 02/10/2017 05:40 AM    Creatinine 0.97 02/10/2017 05:40 AM    Calcium 9.8 02/10/2017 05:40 AM    Magnesium 1.7 09/18/2016 02:20 PM    Phosphorus 4.2 03/21/2010 01:10 AM    Albumin 3.5 02/08/2017 10:00 PM     Anthropometrics:   BMI (calculated): 44.4  Wt Readings from Last 1 Encounters:   02/10/17 113.4 kg (250 lb)    Ht Readings from Last 1 Encounters:   02/09/17 5' 3\" (1.6 m)     Estimated Nutrition Needs:   4227-8078 Kcal/day, 54-68 grams protein/day  Based on:   []Actual BW    [] IBW   [x]   Adjusted BW  (68 kg)    Nutrition Diagnoses: Altered nutrition related lab value related to diabetes as evidenced by Hemoglobin A1c of 8%  Nutrition Interventions: diabetes education, coordination of care  Goal: Blood glucose will be within target range of  mg/dL by 2/13/17     Nutrition Monitoring and Evaluation      []     Monitor po intake on meal rounds  [x]     Continue inpatient monitoring and intervention  []     Other:    Diabetes Patient/Family Education Record    Factors That  May Influence Patients Ability  to Learn or  Comply With  Recommendations:    []   Language barrier    []   Cultural needs   []   Motivation    []   Cognitive limitation    []   Physical   []   Education    []   Physiological factors   []   Hearing/vision/speaking impairment   []   Tenriism beliefs    []   Financial factors   []  Other:   [x]  No factors identified at this time.      Person Instructed:   [x]   Patient   []   Family   []  Other     Preference for Learning:   [x]   Verbal   [x]   Written   []  Demonstration     Level of Comprehension & Competence:    []  Good                                      [x] Fair                                     []  Poor                             []  Needs Reinforcement   [x]  Teachback completed    Education Component:   [x] Medication management, including how to administer insulin (if appropriate) and potential medication interactions    [x]  Nutritional management   []  Exercise   []  Signs, symptoms, and treatment of hyperglycemia and hypoglycemia   []  Prevention, recognition and treatment of hyperglycemia and hypoglycemia   [x]  Importance of blood glucose monitoring and how to obtain a blood glucose meter    []  Instruction on use of blood glucose meter   [x]  Discuss the importance of HbA1C monitoring and provide patient with  results   []  Sick day guidelines   []  Proper use and disposal of lancets, needles, syringes or insulin pens (if appropriate)   []  Potential long-term complications (retinopathy, kidney disease, neuropathy, heart disease, stroke, vascular disease, foot care)   [x] Provide emergency contact number and contact number for more information    [x]  Goal:  Patient/family will demonstrate understanding of Diabetes Self Management Skills by: 2/17/17  Plan for post-discharge education or self-management support:    [x] Outpatient class schedule provided            [] Patient Declined    [] Scheduled for outpatient classes (date) _______       Shahnaz Garcia RD, CDE

## 2017-02-10 NOTE — PROGRESS NOTES
S:  Patient reports feeling much better. She is comfortable and eating dinner. O:   Patient Vitals for the past 4 hrs:   Temp Pulse Resp BP SpO2   02/10/17 1700 - - - 158/75 -   02/10/17 1600 98 °F (36.7 °C) 73 23 156/73 98 %   02/10/17 1500 - 74 22 149/83 97 %       Gen: no acute distress  CV: RRR  Resp: Expiratory wheezes in all fields, but improved. Good aeration.      A/P:   61 y.o. female with history of COPD/Asthma, DMII, HTN, and GERD admitted with acute COPD exacerbation  - Continue breathing treatments and steroids  - Continue supplemental oxygen prn  - Continue levaquin

## 2017-02-10 NOTE — PROGRESS NOTES
Intern Progress Note  [facility]       Patient: Brianne Storm MRN: [id number]  CSN: [id number]    YOB: 1960  Age: 61 y.o. Sex: female    DOA: 2/8/2017 LOS:  LOS: 2 days   PCP: Osito Lam MD                 Subjective:     Acute events: No acute events overnight. Patient is much less dyspneic today. Can complete full sentences without getting SOB. Pt states that she can tell that she is feeling much better today too. Had lengthy discussion about her diabetes and she states that she doesn't make the right choices. She feels that she can do better than she has been. Brief ROS: Endorses mild SOB.  Denies cough, cp, fever, chills, N/V/D    Objective:      Patient Vitals for the past 24 hrs:   Temp Pulse Resp BP SpO2   02/10/17 0600 - 82 25 151/69 95 %   02/10/17 0500 - 83 17 163/76 93 %   02/10/17 0400 97.1 °F (36.2 °C) 75 18 145/74 98 %   02/10/17 0300 - 73 20 131/62 97 %   02/10/17 0200 - 83 30 153/71 99 %   02/10/17 0100 - 76 19 137/72 97 %   02/10/17 0000 98.1 °F (36.7 °C) 83 18 147/64 98 %   02/09/17 2347 - - - - 98 %   02/09/17 2300 - 92 20 163/77 96 %   02/09/17 2200 - 98 30 157/85 98 %   02/09/17 2100 - 94 26 168/71 98 %   02/09/17 2000 98.6 °F (37 °C) 99 16 172/80 99 %   02/09/17 1900 - 90 21 155/71 97 %   02/09/17 1800 - 97 26 155/70 96 %   02/09/17 1720 98.2 °F (36.8 °C) 100 19 148/71 95 %   02/09/17 1400 - 97 24 - 96 %   02/09/17 1300 - (!) 108 19 - 96 %   02/09/17 1200 98.1 °F (36.7 °C) 100 26 136/58 93 %   02/09/17 1126 - - - - 98 %   02/09/17 1010 - 100 23 143/62 96 %   02/09/17 0858 - - - - 98 %   02/09/17 0840 98.4 °F (36.9 °C) (!) 105 20 157/73 94 %       Physical Exam:   General: alert, well developed, well nourished, cooperative and in no apparent distress  Head: NC/AT  Cardiovascular: RRR s MRGs  Respiratory: Improved respiratory effort, mild expiratory wheezes at the bases b/l  Abdomen: Soft, +BS, non-TTP, Non-Distended  Extremities: No edema in lower extremities bilaterally, 2+ radial pulses intact bilaterally  Neuro: Cranial nerves grossly intact, grossly moving upper and lower extremities    Lab/Data Reviewed:    CBC w/Diff Recent Labs      02/10/17   0540  02/09/17   0410 02/08/17   0745   WBC  8.6  9.3  8.4   RBC  3.99*  4.17*  4.35   HGB  12.2  12.8  13.3   HCT  36.7  37.8  38.7   PLT  291  267  226   GRANS  89*  91*  75*   LYMPH  6*  4*  18*   EOS  0  0  1      Chemistry Recent Labs      02/10/17   0540  02/09/17   2323   02/09/17   0410 02/08/17   2200   GLUCPOC   --   245*   < >   --    < >   --    GLU  280*   --    --   262*   --   242*   NA  136   --    --   139   --   139   K  4.4   --    --   4.3   --   4.0   CL  100   --    --   102   --   103   CO2  27   --    --   26   --   26   BUN  18   --    --   11   --   11   CREA  0.97   --    --   0.99   --   1.00   CA  9.8   --    --   9.4   --   9.4   AGAP  9   --    --   11   --   10   BUCR  19   --    --   11*   --   11*   AP   --    --    --    --    --   81   TP   --    --    --    --    --   7.5   ALB   --    --    --    --    --   3.5   GLOB   --    --    --    --    --   4.0   AGRAT   --    --    --    --    --   0.9    < > = values in this interval not displayed.         Microbiology Recent Labs      02/09/17 1711 02/08/17   1645 02/08/17   1614   CULT  PENDING  NO GROWTH AFTER 12 HOURS  NO GROWTH AFTER 12 HOURS     Recent Labs      02/09/17   1711 02/08/17   1645  02/08/17   1614   CULT  PENDING  NO GROWTH AFTER 12 HOURS  NO GROWTH AFTER 12 HOURS      I/O   Intake/Output Summary (Last 24 hours) at 02/10/17 0644  Last data filed at 02/10/17 0000   Gross per 24 hour   Intake              870 ml   Output                0 ml   Net              870 ml          Scheduled Medications Reviewed:  Current Facility-Administered Medications   Medication Dose Route Frequency    guaiFENesin SR (MUCINEX) tablet 600 mg  600 mg Oral BID    arformoterol (BROVANA) neb solution 15 mcg  15 mcg Nebulization BID RT    budesonide (PULMICORT) 500 mcg/2 ml nebulizer suspension  500 mcg Nebulization BID RT    methylPREDNISolone (PF) (SOLU-MEDROL) injection 40 mg  40 mg IntraVENous Q6H    montelukast (SINGULAIR) tablet 10 mg  10 mg Oral QHS    enoxaparin (LOVENOX) injection 40 mg  40 mg SubCUTAneous Q24H    levoFLOXacin (LEVAQUIN) 750 mg in D5W IVPB  750 mg IntraVENous Q24H    aspirin delayed-release tablet 81 mg  81 mg Oral DAILY    pantoprazole (PROTONIX) tablet 40 mg  40 mg Oral ACB    lisinopril (PRINIVIL, ZESTRIL) tablet 40 mg  40 mg Oral DAILY    insulin lispro (HUMALOG) injection   SubCUTAneous Q6H       Imaging, microbiology, and EKG/Telemetry:  CXR (2/9/2017) - Impression:1. Stable bilateral lower lung zone densities. 2. No new infiltrate or consolidation. Assessment/Plan     61 y.o. female with history of COPD/Asthma, DMII, HTN, and GERD admitted with acute COPD exacerbation.  Patient has been treatment with solumedrol and albuterol, but still has increased work of breathing.       Acute COPD exacerbation concomitant w Asthma:  - Continue solumedrol 40 mg q8h  - Duonebs q3h; will increase frequency if necessary  - Levaquin 750 mg daily  - Lactic acid 1.9 this AM  - Supplemental oxygen as needed  - Symbicort Nebs     DMII: A1c - 8.0, sugars have been uncontrolled  - Holding metformin  - Accuchecks achs, SSI  - Will consult endocrine today     HTN: 131//80  - Continue lisinopril 40 mg daily     GERD:   - Continue famotidine and omeprazole (substituted for lansoprazole)      Diet: Diabtetic  DVT Prophylaxis: Lovenox, SCDs  Code Status: FULL  Point of Contact: Brina Amaya, Daughter, [phone]     Disposition and anticipated LOS: 2+ midnights    Hu Whitfield DO PGY-1  2/10/2017, 7:16 AM

## 2017-02-10 NOTE — PROGRESS NOTES
[facility]   Pulmonary and Sleep Medicine     Pulmonary Follow Up Note    IMPRESSION:   · COPD exacerbation with improved wheezing, mild hypoxia and improved work of breathing - no clinical or radiological evidence of pneumonia and etiology to explain patient's symptoms, negative D Dimer- so doubt VTE. Patient with moderate to severe COPD at baseline - improved overall clinically  · Elevated lactate - resolved -most likely related to increased work of breathing and respiratory muscle fatigue  · Morbid obesity, SHERRIE - on CPAP at home  · Hx of hypertension, chronic diastolic CHF  · Hx of GERD  · Hx of diverticulosis  · Former nicotine dependence   RECOMMENDATIONS:   BiPAP routine at night and PRN during day- adjust settings per goal SPO2> 90%  May eventually transition to home CPAP in 1-2 days  Supplemental O2 via NC when off of BiPAP, titrate flow for goal SPO2> 90%  Check PVL study, low clinical suspicion   Continue bronchodilators- Brovana and Pulmicort for patient's ease of use- may switch to Advair HFA at OK home  Steroids - same dose  Antibiotic choice: Levaquin and Azithromycin  Sputum culture if sample available  Aspiration prevention bundle, head of the bed at 30' all times, pulmonary hygiene care  Glycemic control as needed while on steroids  Stress ulcer prophylaxis  DVT prophylaxis  Will defer respective systems problem management to primary and other consultant and follow patient with primary and other team. Alfie Chan to transfer to tele  Further recommendations will be based on the patient's response to recommended treatment and results of the investigation ordered. 2/10/2017   Patient used BiPAP PRN during day and whole night   She reports significant improvement with SOB and work of breathing  The patient denies cough, chest pain, wheezing or hemoptysis.    She is on O2 via NC    HPI:   Ms. Alka Mehta is a 61 y.o. female who is being seen at the request of Dr. Naima Urrutia for COPD exacerbation. The history provided by the patient. He has PMHx for moderate to severe COPD, SHERRIE on CPAP at home, DM 2, HTN, and GERD came to ED with acute on chronic shortness of breath worsening since Monday associated with cough, chest tightness and wheezing. Patient has followed in past with my associate Dr. Hemant Jane and since past 3-4 yrs follows with PCP for COPD management. She reports last exacerbation in Nov and that required 2 courses of steroids with improvement late in last month and followed with PCP last week. Since symptoms, she tried several albuterol nebulizer treatments, but these did not alleviate her symptoms. She denies any fevers, chills, sick contacts, calf pain or swelling, palpitations, syncope, orthopnea, edema or paroxysmal nocturnal dyspnea. Since admitted she continued to have increased work of breathing and her lactic acidosis worsened despite 2L of IV fluids when she was transferred to step down and this consult was requested. Patient reports no major change in her symptoms.     Review of Systems  General ROS: negative for - chills, fever, malaise or night sweats  Cardiovascular ROS: negative for - chest pain, edema, murmur, orthopnea or palpitations  Gastrointestinal ROS: no abdominal pain, change in bowel habits, or black or bloody stools    Past Medical History  Past Medical History   Diagnosis Date    Asthma     COPD     Cystocele, midline     Diabetes mellitus ([address]     GERD (gastroesophageal reflux disease)     Hidradenitis suppurativa     Hyperlipidemia     Hypertension     SHERRIE on CPAP     Stress incontinence        Past Surgical History  Past Surgical History   Procedure Laterality Date    Hx cholecystectomy      Hx appendectomy      Pr breast surgery procedure unlisted       Right breast benign tumor removal    Hx dilation and curettage       Dysfunctional uterine bleeding, thought 2/2 fibroids    Hx tubal ligation         Family History  Family History Problem Relation Age of Onset    Hypertension Mother     Stroke Mother     Breast Cancer Mother      Bilateral mastectomies    Cancer Mother      ovarian and breast    Heart Failure Mother     Heart Attack Father      2011    Heart Surgery Father      CABG    Heart Failure Father     COPD Sister      Heavy smoker    Cancer Sister      ovarian    Heart Failure Sister     Lung Disease Sister     Asthma Child     Cancer Maternal Aunt      pancreatic    Cancer Maternal Grandfather      stomach       Social History  Social History   Substance Use Topics    Smoking status: Former Smoker     Packs/day: 1.00     Years: 30.00     Types: Cigarettes     Quit date: 9/6/2006    Smokeless tobacco: Never Used      Comment: 1-1.5 packs per day    Alcohol use No        Medications  Current Facility-Administered Medications   Medication Dose Route Frequency    guaiFENesin SR (MUCINEX) tablet 600 mg  600 mg Oral BID    albuterol-ipratropium (DUO-NEB) 2.5 MG-0.5 MG/3 ML  3 mL Nebulization Q4H PRN    arformoterol (BROVANA) neb solution 15 mcg  15 mcg Nebulization BID RT    budesonide (PULMICORT) 500 mcg/2 ml nebulizer suspension  500 mcg Nebulization BID RT    methylPREDNISolone (PF) (SOLU-MEDROL) injection 40 mg  40 mg IntraVENous Q6H    montelukast (SINGULAIR) tablet 10 mg  10 mg Oral QHS    enoxaparin (LOVENOX) injection 40 mg  40 mg SubCUTAneous Q24H    acetaminophen (TYLENOL) tablet 650 mg  650 mg Oral Q4H PRN    levoFLOXacin (LEVAQUIN) 750 mg in D5W IVPB  750 mg IntraVENous Q24H    glucose chewable tablet 16 g  4 Tab Oral PRN    glucagon (GLUCAGEN) injection 1 mg  1 mg IntraMUSCular PRN    dextrose (D50W) injection syrg 12.5-25 g  25-50 mL IntraVENous PRN    aspirin delayed-release tablet 81 mg  81 mg Oral DAILY    pantoprazole (PROTONIX) tablet 40 mg  40 mg Oral ACB    lisinopril (PRINIVIL, ZESTRIL) tablet 40 mg  40 mg Oral DAILY    insulin lispro (HUMALOG) injection   SubCUTAneous Q6H Prior to Admission medications    Medication Sig Start Date End Date Taking? Authorizing Provider   azithromycin (ZITHROMAX Z-WESLEY) 250 mg tablet Take 1 Tab by mouth daily. 2/8/17  Yes Moises Quiroz DO   predniSONE (DELTASONE) 50 mg tablet Take 1 Tab by mouth daily for 5 days. 2/8/17 2/13/17 Yes Moises Quiroz, DO   fluticasone-salmeterol (ADVAIR HFA) 115-21 mcg/actuation inhaler Take 2 Puffs by inhalation two (2) times a day. Yes Historical Provider   aspirin delayed-release 81 mg tablet Take 81 mg by mouth daily. Yes Historical Provider   famotidine (PEPCID) 20 mg tablet Take 20 mg by mouth nightly. Yes Historical Provider   lansoprazole (PREVACID) 30 mg capsule Take 30 mg by mouth Daily (before breakfast). Yes Historical Provider   metFORMIN (GLUCOPHAGE) 500 mg tablet Take  by mouth two (2) times daily (with meals). Yes Historical Provider   lisinopril (PRINIVIL, ZESTRIL) 10 mg tablet Take 40 mg by mouth daily. Yes Historical Provider   albuterol (PROVENTIL HFA, VENTOLIN HFA, PROAIR HFA) 90 mcg/actuation inhaler Take 2 Puffs by inhalation every four (4) hours as needed for Wheezing. For the next 2 days after hospital discharge, use your inhaler 4 times a day. 3/9/16  Yes Bebeto Bella MD   tiotropium (SPIRIVA WITH HANDIHALER) 18 mcg inhalation capsule Take 1 Cap by inhalation daily. Yes Historical Provider   albuterol (PROVENTIL VENTOLIN) 2.5 mg /3 mL (0.083 %) nebulizer solution 2.5 mg by Nebulization route every four (4) hours as needed for Wheezing. Yes Historical Provider   Inhalational Spacing Device (AEROCHAMBER) 1 Each by Does Not Apply route as needed for Cough or Other (COPD exacerbation). 10/2/12  Yes Danitza South, DO   fluticasone (FLONASE) 50 mcg/actuation nasal spray 2 Sprays by Both Nostrils route daily. Yes Historical Provider   montelukast (SINGULAIR) 10 mg tablet Take 10 mg by mouth daily.      Yes Lauren Allison MD   guaiFENesin ER (MUCINEX) 600 mg ER tablet Take 600 mg by mouth two (2) times daily as needed for Congestion. Historical Provider   triamcinolone acetonide (KENALOG) 0.1 % ointment Apply  to affected area two (2) times daily as needed for Skin Irritation. use thin layer    Historical Provider       Allergy  Allergies   Allergen Reactions    Ancef [Cefazolin] Hives    Contrast Agent [Iodine] Anaphylaxis, Shortness of Breath and Swelling     Needs pre-medication for IV contrast with Benadryl, Solu-Medrol    Metformin Other (comments)     Abdominal pain, diarrhea.  Codeine Other (comments)     Altered mental status       Physical Exam:   Vital Signs:    Blood pressure 170/78, pulse 83, temperature 98.1 °F (36.7 °C), resp. rate 29, height 5' 3\" (1.6 m), weight 113.4 kg (250 lb), SpO2 95 %. Body mass index is 44.29 kg/(m^2).     O2 Device: Nasal cannula   O2 Flow Rate (L/min): 3 l/min (Jx4aofa from 4LPM)   Temp (24hrs), Av °F (36.7 °C), Min:97.1 °F (36.2 °C), Max:98.6 °F (37 °C)       Intake/Output:   Last shift:         Last 3 shifts:  1901 - 02/10 0700  In: 798 [P.O.:870]  Out: -     Intake/Output Summary (Last 24 hours) at 02/10/17 0913  Last data filed at 02/10/17 0000   Gross per 24 hour   Intake              630 ml   Output                0 ml   Net              630 ml      General: in no respiratory distress and acyanotic and oriented times 3, alert, cooperative, appears older than stated age, morbidly obese, on O2 via NC  HEENT: PERRLA, EOMI, fundi benign, throat normal without erythema or exudate  Neck: No abnormally enlarged lymph nodes or thyroid, supple  Chest: normal, obese  Lungs: moderate air entry, end expiratory wheeze and prolonged expiration, normal percussion bilaterally, exam limitation 2' to body habitus, no tenderness/ rash  Heart: Regular rate and rhythm, S1S2 present or without murmur or extra heart sounds  Abdomen: obese, bowel sounds normoactive, tympanic, abdomen is soft without significant tenderness, masses, organomegaly or guarding  Extremity: negative for bl LE edema, cyanosis, clubbing  Neuro: alert, oriented x 3, no defects noted in general exam.  Skin: Skin color, texture, turgor normal. No rashes or lesions    Labs Reviewed:  Recent Results (from the past 24 hour(s))   GLUCOSE, POC    Collection Time: 02/09/17 11:43 AM   Result Value Ref Range    Glucose (POC) 242 (H) 70 - 110 mg/dL   CARDIAC PANEL,(CK, CKMB & TROPONIN)    Collection Time: 02/09/17 11:51 AM   Result Value Ref Range    CK 56 26 - 192 U/L    CK - MB <0.5 (L) 0.5 - 3.6 ng/ml    CK-MB Index CANNOT BE CALCULATED 0.0 - 4.0 %    Troponin-I, Qt. <0.02 0.0 - 0.045 NG/ML   LACTIC ACID, PLASMA    Collection Time: 02/09/17 11:51 AM   Result Value Ref Range    Lactic acid 3.9 (HH) 0.4 - 2.0 MMOL/L   LACTIC ACID, PLASMA    Collection Time: 02/09/17  3:20 PM   Result Value Ref Range    Lactic acid 3.8 (HH) 0.4 - 2.0 MMOL/L   GLUCOSE, POC    Collection Time: 02/09/17  5:09 PM   Result Value Ref Range    Glucose (POC) 250 (H) 70 - 110 mg/dL   CULTURE, RESPIRATORY/SPUTUM/BRONCH W GRAM STAIN    Collection Time: 02/09/17  5:11 PM   Result Value Ref Range    Special Requests: NO SPECIAL REQUESTS      GRAM STAIN >25 WBC/lpf      GRAM STAIN <10 EPI/lpf      GRAM STAIN MUCUS PRESENT      GRAM STAIN FEW  GRAM POSITIVE COCCI  IN PAIRS        GRAM STAIN RARE  GRAM POSITIVE RODS        GRAM STAIN RARE  GRAM NEGATIVE RODS        Culture result: PENDING    CARDIAC PANEL,(CK, CKMB & TROPONIN)    Collection Time: 02/09/17  8:41 PM   Result Value Ref Range    CK 75 26 - 192 U/L    CK - MB 0.8 0.5 - 3.6 ng/ml    CK-MB Index 1.1 0.0 - 4.0 %    Troponin-I, Qt. <0.02 0.0 - 0.045 NG/ML   LACTIC ACID, PLASMA    Collection Time: 02/09/17  8:41 PM   Result Value Ref Range    Lactic acid 3.4 (HH) 0.4 - 2.0 MMOL/L   GLUCOSE, POC    Collection Time: 02/09/17  9:44 PM   Result Value Ref Range    Glucose (POC) 264 (H) 70 - 110 mg/dL   GLUCOSE, POC    Collection Time: 02/09/17 11:23 PM   Result Value Ref Range    Glucose (POC) 245 (H) 70 - 110 mg/dL   POC G3    Collection Time: 02/10/17  4:54 AM   Result Value Ref Range    Device: BIPAP      pH (POC) 7.372 7.35 - 7.45      pCO2 (POC) 48.8 (H) 35.0 - 45.0 MMHG    pO2 (POC) 131 (H) 80 - 100 MMHG    HCO3 (POC) 28.3 (H) 22 - 26 MMOL/L    sO2 (POC) 99 (H) 92 - 97 %    Base excess (POC) 3 mmol/L    PEEP/CPAP (POC) 6 cmH2O    PIP (POC) 15      Pressure support 8 cmH2O    Allens test (POC) YES      Bleed In 40 L/min    Site LEFT RADIAL      Specimen type (POC) ARTERIAL      Performed by Marylene Spruce Spontaneous timed YES     CBC WITH AUTOMATED DIFF    Collection Time: 02/10/17  5:40 AM   Result Value Ref Range    WBC 8.6 4.6 - 13.2 K/uL    RBC 3.99 (L) 4.20 - 5.30 M/uL    HGB 12.2 12.0 - 16.0 g/dL    HCT 36.7 35.0 - 45.0 %    MCV 92.0 74.0 - 97.0 FL    MCH 30.6 24.0 - 34.0 PG    MCHC 33.2 31.0 - 37.0 g/dL    RDW 13.8 11.6 - 14.5 %    PLATELET 113 938 - 314 K/uL    MPV 9.4 9.2 - 11.8 FL    NEUTROPHILS 89 (H) 40 - 73 %    LYMPHOCYTES 6 (L) 21 - 52 %    MONOCYTES 5 3 - 10 %    EOSINOPHILS 0 0 - 5 %    BASOPHILS 0 0 - 2 %    ABS. NEUTROPHILS 7.7 1.8 - 8.0 K/UL    ABS. LYMPHOCYTES 0.5 (L) 0.9 - 3.6 K/UL    ABS. MONOCYTES 0.4 0.05 - 1.2 K/UL    ABS. EOSINOPHILS 0.0 0.0 - 0.4 K/UL    ABS.  BASOPHILS 0.0 0.0 - 0.1 K/UL    DF AUTOMATED     METABOLIC PANEL, BASIC    Collection Time: 02/10/17  5:40 AM   Result Value Ref Range    Sodium 136 136 - 145 mmol/L    Potassium 4.4 3.5 - 5.5 mmol/L    Chloride 100 100 - 108 mmol/L    CO2 27 21 - 32 mmol/L    Anion gap 9 3.0 - 18 mmol/L    Glucose 280 (H) 74 - 99 mg/dL    BUN 18 7.0 - 18 MG/DL    Creatinine 0.97 0.6 - 1.3 MG/DL    BUN/Creatinine ratio 19 12 - 20      GFR est AA >60 >60 ml/min/1.73m2    GFR est non-AA 59 (L) >60 ml/min/1.73m2    Calcium 9.8 8.5 - 10.1 MG/DL   LACTIC ACID, PLASMA    Collection Time: 02/10/17  5:40 AM   Result Value Ref Range    Lactic acid 1.9 0.4 - 2.0 MMOL/L   D DIMER    Collection Time: 02/10/17  5:40 AM Result Value Ref Range    D DIMER <0.27 <0.46 ug/ml(FEU)   GLUCOSE, POC    Collection Time: 02/10/17  8:52 AM   Result Value Ref Range    Glucose (POC) 276 (H) 70 - 110 mg/dL           Recent Labs      02/10/17   0454  02/08/17   2216   FIO2I   --   21   HCO3I  28.3*  23.5   PCO2I  48.8*  40.7   PHI  7.372  7.370   PO2I  131*  66*       All Micro Results     Procedure Component Value Units Date/Time    CULTURE, BLOOD [[id number]] Collected:  02/08/17 1645    Order Status:  Completed Specimen:  Whole Blood from Blood Updated:  02/10/17 0736     Special Requests: NO SPECIAL REQUESTS        Culture result: NO GROWTH 2 DAYS       CULTURE, BLOOD [[id number]] Collected:  02/08/17 1614    Order Status:  Completed Specimen:  Whole Blood from Blood Updated:  02/10/17 0736     Special Requests: NO SPECIAL REQUESTS        Culture result: NO GROWTH 2 DAYS       CULTURE, RESPIRATORY/SPUTUM/BRONCH Durel Euler STAIN [[id number]] Collected:  02/09/17 1711    Order Status:  Completed Specimen:  Sputum from Sputum Updated:  02/09/17 2252     Special Requests: NO SPECIAL REQUESTS        GRAM STAIN >25 WBC/lpf         <10 EPI/lpf         MUCUS PRESENT         FEW  GRAM POSITIVE COCCI  IN PAIRS         RARE  GRAM POSITIVE RODS         RARE  GRAM NEGATIVE RODS        Culture result: PENDING           5/29/16 PFT   PULMONARY FUNCTIONS TEST:  30 pack year smoker, Sob  Good Effort and reproducibility. Impression:  Low FEV1 ratio so evidence of obstructive disease. FEV1 of 1.32 or 51% predicted  Significant  bronchodilator response. Normal TLC so  evidence of restrictive disease. TLC of 111% predicted. Reduction in DLCO suggest  abnormality in gas exchange related to differential including parenchymal lung disease, isolated reduction can be seen in anemia, chf or pulmonary vascular disease correlate clinically. DLCO corrected to [place] is normal.  RV/TLC elevated suggest hyperinflation.   PFT consistent with moderate to severe obstructive airway disease with hyper inflation and mild reduction in DLCO. Overall looks like severe COPD with significant bronchodilator response. Possible ACOS <Asthma COPD Overlap Syndrome>. Flows:  FEV 1% is reduced/normal  FVC 1.93 or 59% Predicated LLN  Post BD 2.54 or 77% Predicated 31 % Change  FEV1 1.04 or 40% Predicated LLN  Post BD 1.32 or 51% Predicated  27 % Change  FEV1/FVC 54  Volumes:  Normal Volumes  TLC 5.66 or 111 % Predicted   RV/TLC 64 or 181 % Predicted   ERV 0.26 or 31 % Predicted LLN   Flow Volume Loop:  Nonspecific obstructive pattern in Flow Volume Loop  Bronchodilator:  Significant improvement with bronchodilator  Diffusion:   DLCO 15.97 or 69 % Predicted LLN   DLCO/VA 4.45 or 96 % Predicted  Comment:  Failure to respond to inhaled bronchodilator should not preclude a clinical trial of therapy  Please see scanned PFT raw data in patient's chart. Imaging:  [x]I have personally reviewed the patients chest radiographs images and report with the patient    Results from [place] encounter on 02/08/17   XR CHEST PORT   Narrative Procedure:  Chest portable. Indication:  Shortness of breath. Comparison:  9/18/16, 6/3/15. Findings: An obliquely oriented bandlike density is identified in the right  lung base. A similar density was noted on 9/18/2016 and 6/3/2015. A focus of  hazy density with circumscribed margins is identified in the left lower lung  zone adjacent to the cardiac apex. This density is found to be related to a  pericardial fat pad on 6/3/15. No new infiltrate or consolidation is  identified. The cardiac silhouette is mildly prominent. Impression Impression:    1. Stable bilateral lower lung zone densities. 2.  No new infiltrate or consolidation.          [x]See my orders for details    My assessment, plan of care, findings, medications, side effects etc were discussed with:  [x]nursing []PT/OT    [x]respiratory therapy [x]Dr. William Kunz   []family [x]Patient [x]Total care time exclusive of procedures 31 minutes with complex decision making performed and > 50% time spent in face to face consultation.     Micha Harris MD

## 2017-02-11 LAB
ANION GAP BLD CALC-SCNC: 10 MMOL/L (ref 3–18)
BACTERIA SPEC CULT: NORMAL
BASOPHILS # BLD AUTO: 0 K/UL (ref 0–0.06)
BASOPHILS # BLD: 0 % (ref 0–3)
BUN SERPL-MCNC: 21 MG/DL (ref 7–18)
BUN/CREAT SERPL: 24 (ref 12–20)
CALCIUM SERPL-MCNC: 9.9 MG/DL (ref 8.5–10.1)
CHLORIDE SERPL-SCNC: 97 MMOL/L (ref 100–108)
CO2 SERPL-SCNC: 29 MMOL/L (ref 21–32)
CREAT SERPL-MCNC: 0.88 MG/DL (ref 0.6–1.3)
DIFFERENTIAL METHOD BLD: ABNORMAL
EOSINOPHIL # BLD: 0 K/UL (ref 0–0.4)
EOSINOPHIL NFR BLD: 0 % (ref 0–5)
ERYTHROCYTE [DISTWIDTH] IN BLOOD BY AUTOMATED COUNT: 13.8 % (ref 11.6–14.5)
GLUCOSE BLD STRIP.AUTO-MCNC: 191 MG/DL (ref 70–110)
GLUCOSE BLD STRIP.AUTO-MCNC: 207 MG/DL (ref 70–110)
GLUCOSE BLD STRIP.AUTO-MCNC: 256 MG/DL (ref 70–110)
GLUCOSE BLD STRIP.AUTO-MCNC: 284 MG/DL (ref 70–110)
GLUCOSE SERPL-MCNC: 237 MG/DL (ref 74–99)
GRAM STN SPEC: NORMAL
HCT VFR BLD AUTO: 37.8 % (ref 35–45)
HGB BLD-MCNC: 12.4 G/DL (ref 12–16)
LYMPHOCYTES # BLD AUTO: 5 % (ref 20–51)
LYMPHOCYTES # BLD: 0.5 K/UL (ref 0.8–3.5)
MCH RBC QN AUTO: 30.2 PG (ref 24–34)
MCHC RBC AUTO-ENTMCNC: 32.8 G/DL (ref 31–37)
MCV RBC AUTO: 92.2 FL (ref 74–97)
MONOCYTES # BLD: 0.5 K/UL (ref 0–1)
MONOCYTES NFR BLD AUTO: 5 % (ref 2–9)
NEUTS BAND NFR BLD MANUAL: 4 % (ref 0–5)
NEUTS SEG # BLD: 8.1 K/UL (ref 1.8–8)
NEUTS SEG NFR BLD AUTO: 86 % (ref 42–75)
PLATELET # BLD AUTO: 319 K/UL (ref 135–420)
PLATELET COMMENTS,PCOM: ABNORMAL
PMV BLD AUTO: 9.4 FL (ref 9.2–11.8)
POTASSIUM SERPL-SCNC: 4.2 MMOL/L (ref 3.5–5.5)
RBC # BLD AUTO: 4.1 M/UL (ref 4.2–5.3)
RBC MORPH BLD: ABNORMAL
SERVICE CMNT-IMP: NORMAL
SODIUM SERPL-SCNC: 136 MMOL/L (ref 136–145)
WBC # BLD AUTO: 9.1 K/UL (ref 4.6–13.2)

## 2017-02-11 PROCEDURE — 74011250637 HC RX REV CODE- 250/637: Performed by: FAMILY MEDICINE

## 2017-02-11 PROCEDURE — 94640 AIRWAY INHALATION TREATMENT: CPT

## 2017-02-11 PROCEDURE — 74011250636 HC RX REV CODE- 250/636: Performed by: FAMILY MEDICINE

## 2017-02-11 PROCEDURE — 74011636637 HC RX REV CODE- 636/637: Performed by: FAMILY MEDICINE

## 2017-02-11 PROCEDURE — 74011250636 HC RX REV CODE- 250/636: Performed by: INTERNAL MEDICINE

## 2017-02-11 PROCEDURE — 82962 GLUCOSE BLOOD TEST: CPT

## 2017-02-11 PROCEDURE — 65660000000 HC RM CCU STEPDOWN

## 2017-02-11 PROCEDURE — 74011636637 HC RX REV CODE- 636/637: Performed by: INTERNAL MEDICINE

## 2017-02-11 PROCEDURE — 36415 COLL VENOUS BLD VENIPUNCTURE: CPT | Performed by: FAMILY MEDICINE

## 2017-02-11 PROCEDURE — 80048 BASIC METABOLIC PNL TOTAL CA: CPT | Performed by: FAMILY MEDICINE

## 2017-02-11 PROCEDURE — 74011000250 HC RX REV CODE- 250: Performed by: INTERNAL MEDICINE

## 2017-02-11 PROCEDURE — 85025 COMPLETE CBC W/AUTO DIFF WBC: CPT | Performed by: FAMILY MEDICINE

## 2017-02-11 RX ORDER — PREDNISONE 20 MG/1
40 TABLET ORAL
Status: DISCONTINUED | OUTPATIENT
Start: 2017-02-12 | End: 2017-02-14 | Stop reason: HOSPADM

## 2017-02-11 RX ORDER — INSULIN LISPRO 100 [IU]/ML
14 INJECTION, SOLUTION INTRAVENOUS; SUBCUTANEOUS
Status: DISCONTINUED | OUTPATIENT
Start: 2017-02-11 | End: 2017-02-12

## 2017-02-11 RX ORDER — INSULIN GLARGINE 100 [IU]/ML
50 INJECTION, SOLUTION SUBCUTANEOUS DAILY
Status: DISCONTINUED | OUTPATIENT
Start: 2017-02-12 | End: 2017-02-12

## 2017-02-11 RX ADMIN — INSULIN LISPRO 3 UNITS: 100 INJECTION, SOLUTION INTRAVENOUS; SUBCUTANEOUS at 18:58

## 2017-02-11 RX ADMIN — ARFORMOTEROL TARTRATE 15 MCG: 15 SOLUTION RESPIRATORY (INHALATION) at 20:43

## 2017-02-11 RX ADMIN — MONTELUKAST SODIUM 10 MG: 10 TABLET, FILM COATED ORAL at 23:14

## 2017-02-11 RX ADMIN — INSULIN GLARGINE 30 UNITS: 100 INJECTION, SOLUTION SUBCUTANEOUS at 08:52

## 2017-02-11 RX ADMIN — ENOXAPARIN SODIUM 40 MG: 100 INJECTION SUBCUTANEOUS at 18:58

## 2017-02-11 RX ADMIN — LEVOFLOXACIN 750 MG: 5 INJECTION, SOLUTION INTRAVENOUS at 14:39

## 2017-02-11 RX ADMIN — INSULIN LISPRO 10 UNITS: 100 INJECTION, SOLUTION INTRAVENOUS; SUBCUTANEOUS at 12:48

## 2017-02-11 RX ADMIN — ASPIRIN 81 MG: 81 TABLET, COATED ORAL at 08:54

## 2017-02-11 RX ADMIN — INSULIN LISPRO 10 UNITS: 100 INJECTION, SOLUTION INTRAVENOUS; SUBCUTANEOUS at 08:53

## 2017-02-11 RX ADMIN — LISINOPRIL 40 MG: 20 TABLET ORAL at 08:54

## 2017-02-11 RX ADMIN — GUAIFENESIN 600 MG: 600 TABLET, EXTENDED RELEASE ORAL at 08:54

## 2017-02-11 RX ADMIN — PANTOPRAZOLE SODIUM 40 MG: 40 TABLET, DELAYED RELEASE ORAL at 08:54

## 2017-02-11 RX ADMIN — BUDESONIDE 500 MCG: 0.5 INHALANT RESPIRATORY (INHALATION) at 20:43

## 2017-02-11 RX ADMIN — METHYLPREDNISOLONE SODIUM SUCCINATE 40 MG: 125 INJECTION, POWDER, FOR SOLUTION INTRAMUSCULAR; INTRAVENOUS at 07:06

## 2017-02-11 RX ADMIN — BUDESONIDE 500 MCG: 0.5 INHALANT RESPIRATORY (INHALATION) at 08:36

## 2017-02-11 RX ADMIN — GUAIFENESIN 600 MG: 600 TABLET, EXTENDED RELEASE ORAL at 18:58

## 2017-02-11 RX ADMIN — INSULIN LISPRO 9 UNITS: 100 INJECTION, SOLUTION INTRAVENOUS; SUBCUTANEOUS at 12:47

## 2017-02-11 RX ADMIN — METHYLPREDNISOLONE SODIUM SUCCINATE 40 MG: 125 INJECTION, POWDER, FOR SOLUTION INTRAMUSCULAR; INTRAVENOUS at 12:48

## 2017-02-11 RX ADMIN — METHYLPREDNISOLONE SODIUM SUCCINATE 40 MG: 125 INJECTION, POWDER, FOR SOLUTION INTRAMUSCULAR; INTRAVENOUS at 00:48

## 2017-02-11 RX ADMIN — INSULIN LISPRO 6 UNITS: 100 INJECTION, SOLUTION INTRAVENOUS; SUBCUTANEOUS at 23:13

## 2017-02-11 RX ADMIN — METHYLPREDNISOLONE SODIUM SUCCINATE 40 MG: 40 INJECTION, POWDER, FOR SOLUTION INTRAMUSCULAR; INTRAVENOUS at 23:13

## 2017-02-11 RX ADMIN — INSULIN LISPRO 9 UNITS: 100 INJECTION, SOLUTION INTRAVENOUS; SUBCUTANEOUS at 08:53

## 2017-02-11 RX ADMIN — ARFORMOTEROL TARTRATE 15 MCG: 15 SOLUTION RESPIRATORY (INHALATION) at 08:36

## 2017-02-11 RX ADMIN — INSULIN LISPRO 14 UNITS: 100 INJECTION, SOLUTION INTRAVENOUS; SUBCUTANEOUS at 18:57

## 2017-02-11 NOTE — ROUTINE PROCESS
TRANSFER - IN REPORT:    Verbal report received from SONI Pinedo RN(name) on Claire Valerio  being received from ICU(unit) for routine progression of care      Report consisted of patients Situation, Background, Assessment and   Recommendations(SBAR). Information from the following report(s) SBAR, Kardex, [facility] and Cardiac Rhythm NSR was reviewed with the receiving nurse. Opportunity for questions and clarification was provided. Assessment completed upon patients arrival to unit and care assumed.

## 2017-02-11 NOTE — PROGRESS NOTES
CDMP Query Update:    Patient has a history of Asthma, but is currently in COPD Exacerbation    Naya Solomon MD PGY-3  [facility]

## 2017-02-11 NOTE — ROUTINE PROCESS
Bedside shift change report given to [address], [address], RN (oncoming nurse) by Alexa Montemayor RN (offgoing nurse). Report included the following information SBAR, Kardex and MAR.     1915 - Bedside shift change report given to Alexa Montemayor RN (oncoming nurse) by Salvador Lea RN (offgoing nurse). Report included the following information SBAR, Kardex and MAR.

## 2017-02-11 NOTE — ROUTINE PROCESS
Bedside and Verbal shift change report given to MAHI Ureña RN (oncoming nurse) by SONI Sy (offgoing nurse). Report included the following information SBAR, Kardex, MAR and Cardiac Rhythm NSR.

## 2017-02-11 NOTE — PROGRESS NOTES
Intern Progress Note  [facility]       Patient: Opal Po MRN: [id number]  CSN: [id number]    YOB: 1960  Age: 61 y.o. Sex: female    DOA: 2/8/2017 LOS:  LOS: 3 days                    Subjective:     Acute events:  Ms Bhavna Ko reports feeling 60% improvement towards being 100% back to her baseline. She is reporting improved work of breathing, and is now able to speak clearly in full sentences. Tolerating PO meals and fluids well. No difficulty using the bathroom to urinate or defecate. Reports increasing exercise tolerance. Reports the spiriva and brovana make her cough, but this tolerable and she states they are helping a lot. She has not had to use BIPAP overnight, or during the day. Denies adverse effects of other medications listed below. Has no other acute complaints, concerns or requests. Review of Systems   Constitutional: Negative for chills and fever. HENT: Negative for ear discharge and ear pain. Eyes: Negative for blurred vision, double vision, discharge and redness. Respiratory: Positive for cough and wheezing. Negative for hemoptysis, sputum production and shortness of breath. Cardiovascular: Negative for chest pain, palpitations and leg swelling. Gastrointestinal: Negative for abdominal pain, nausea and vomiting. Genitourinary: Negative for dysuria, frequency, hematuria and urgency. Musculoskeletal: Negative for back pain and neck pain. Skin: Negative for itching and rash. Neurological: Negative for dizziness, tingling, tremors, sensory change, speech change and headaches. Psychiatric/Behavioral: Negative for depression and suicidal ideas.        Objective:      Patient Vitals for the past 24 hrs:   Temp Pulse Resp BP SpO2   02/11/17 0000 96 °F (35.6 °C) 77 18 150/86 97 %   02/10/17 2011 - - - - 98 %   02/10/17 2000 96.9 °F (36.1 °C) 86 18 152/80 94 %   02/10/17 1700 - - - 158/75 -   02/10/17 1600 98 °F (36.7 °C) 73 23 156/73 98 %   02/10/17 1500 - 74 22 149/83 97 %   02/10/17 1400 - 80 13 - 98 %   02/10/17 1300 - 76 28 156/74 98 %   02/10/17 1200 98.2 °F (36.8 °C) - - 159/74 -   02/10/17 1100 - 82 21 149/74 96 %   02/10/17 1000 - 79 - 157/76 96 %   02/10/17 0946 - 77 - 184/83 -   02/10/17 0900 - 86 25 184/83 93 %   02/10/17 0837 - - - - 95 %   02/10/17 0800 98.1 °F (36.7 °C) 81 25 177/81 95 %   02/10/17 0700 98.1 °F (36.7 °C) 83 29 170/78 97 %   02/10/17 0600 - 82 25 151/69 95 %   02/10/17 0500 - 83 17 163/76 93 %   02/10/17 0400 97.1 °F (36.2 °C) 75 18 145/74 98 %         Intake/Output Summary (Last 24 hours) at 02/11/17 0303  Last data filed at 02/10/17 1803   Gross per 24 hour   Intake             1150 ml   Output             1800 ml   Net             -650 ml       Physical Exam   Constitutional: She is oriented to person, place, and time. She appears well-developed and well-nourished. No distress. HENT:   Head: Normocephalic and atraumatic. Right Ear: External ear normal.   Left Ear: External ear normal.   Eyes: Conjunctivae and EOM are normal. Pupils are equal, round, and reactive to light. Neck: Normal range of motion. Neck supple. Cardiovascular: Normal rate, regular rhythm, normal heart sounds and intact distal pulses. Pulmonary/Chest: No accessory muscle usage. No apnea, no tachypnea and no bradypnea. No respiratory distress. She has no decreased breath sounds. She has wheezes. She has no rhonchi. She has no rales. Abdominal: Soft. Bowel sounds are normal. She exhibits no distension. Musculoskeletal: She exhibits no edema or tenderness. Neurological: She is alert and oriented to person, place, and time. Skin: Skin is warm and dry. She is not diaphoretic. Nursing note and vitals reviewed. Lab/Data Reviewed: All lab results for the last 24 hours reviewed.      Scheduled Medications Reviewed:  Current Facility-Administered Medications   Medication Dose Route Frequency    insulin glargine (LANTUS) injection 30 Units  30 Units SubCUTAneous DAILY    insulin lispro (HUMALOG) injection 10 Units  10 Units SubCUTAneous TIDAC    insulin lispro (HUMALOG) injection   SubCUTAneous AC&HS    guaiFENesin SR (MUCINEX) tablet 600 mg  600 mg Oral BID    arformoterol (BROVANA) neb solution 15 mcg  15 mcg Nebulization BID RT    budesonide (PULMICORT) 500 mcg/2 ml nebulizer suspension  500 mcg Nebulization BID RT    methylPREDNISolone (PF) (SOLU-MEDROL) injection 40 mg  40 mg IntraVENous Q6H    montelukast (SINGULAIR) tablet 10 mg  10 mg Oral QHS    enoxaparin (LOVENOX) injection 40 mg  40 mg SubCUTAneous Q24H    levoFLOXacin (LEVAQUIN) 750 mg in D5W IVPB  750 mg IntraVENous Q24H    aspirin delayed-release tablet 81 mg  81 mg Oral DAILY    pantoprazole (PROTONIX) tablet 40 mg  40 mg Oral ACB    lisinopril (PRINIVIL, ZESTRIL) tablet 40 mg  40 mg Oral DAILY           Assessment/Plan     COPD exacerbation, improving. PVLs 02/10/2017 negative. D dimer negative. Lactic acidosis resolving (Lactic Acid 3.4>4.0>4.2>5.1>3.3>3.9>3.8>1.9). · Dr Ishmael Sy, Pulmonary, following patient, input greatly appreciated  · Continue supplemental O2, with BIPAP qhs and PRN O2 goals >90%  · Continue brovana, pulmicort, levaquin and azithromycin, methyprednisolone 40mg q6hrs  · Continue aspiration precautions,   · Duo-nebs q4hrs PRN     Type II diabetes mellitus, elevated POC glucose consistent with patient's steroid therapy for COPD exacerbation above  · Continue correctional scale insulin, accuchecks qachs, Lantus 30 units daily, 10 units humalog before meals    Hypertension, poorly controlled BP range overnight 149//83, in setting of patient undergoing glucocorticoid therapy with methylprednisolone   · Consider adding antihypertensive such as chlorthalidone, indapamide for tighter BP control.    · Continue lisinopril    Diet: Diabetic, high fiber  DVT Prophylaxis: Lovenox, SCDs  Code Status: FULL  Point of Contact: Alvena Mcardle Damien Rees, Daughter, [phone]    Disposition: Home with home health, patient has chosen [facility], anticipate if patient continues to make such progress she may be discharged from the hospital on around 02/13/2017 or shortly thereafter    Jason GONZÁLES,BS   PGY-2 120 [facility]  02/11/17 3:03 AM

## 2017-02-11 NOTE — PROGRESS NOTES
Pt examined. Chart reviewed. Report dictated. Imp: exacerbation of diabetes due to COPD and steroid therapy. The patient did not have lactic acidosis. PH was normal, and bicarbonate normal.     Plan: Increase insulin to 50 units Lantus daily, and 14 units prandial dose daily.  Oral agents can be added

## 2017-02-11 NOTE — PROGRESS NOTES
[facility]   Pulmonary and Sleep Medicine     Pulmonary Progress Note    Name: Chris Burroughs   : 1960   MRN: [id number]   Date: 2017    [x]I have reviewed the flowsheet and previous days notes. Events, vitals, medications and notes from last 24 hours reviewed. Care plan discussed with nursing and  patient. IMPRESSION:   · COPD exacerbation with improved wheezing, mild hypoxia and improved work of breathing - no clinical or radiological evidence of pneumonia and etiology to explain patient's symptoms, negative D Dimer- doubt VTE.  Patient with moderate to severe COPD at baseline - improved overall clinically  · Elevated lactate - resolved -most likely related to increased work of breathing and respiratory muscle fatigue  · Morbid obesity, SHERRIE - on CPAP at home  · Hx of hypertension, chronic diastolic CHF  · Hx of GERD  · Hx of diverticulosis  · Former nicotine dependence    PLAN:   Last night patient did not use BIPAP, Please consider BiPAP routine at night, and on PRN during day- adjust settings per goal SPO2> 88%  May eventually transition to home CPAP in 1-2 days  Supplemental O2 via NC when off of BiPAP, titrate flow for goal SPO2> 88%  PVL study Negative for DVT  Continue bronchodilators- Brovana and Pulmicort for patient's ease of use- may switch to Advair HFA at DC home  Steroids - Solumedrol until evening,  from tomorrow PO prednisone   Antibiotic choice: Levaquin - for five days   Sputum culture if sample available  Aspiration prevention bundle, head of the bed at 30' all times, pulmonary hygiene care  Glycemic control as needed while on steroids  Stress ulcer prophylaxis  DVT prophylaxis  Will defer respective systems problem management to primary and other consultant and follow patient with primary and other team. Juan Lind to transfer to tele  Further recommendations will be based on the patient's response to recommended treatment and results of the investigation ordered. Subjective:  Patient with COPD presented with COPD exacerbation. Reports interval improvement. Review of Systems   Constitutional: Negative. HENT: Negative. Eyes: Negative. Cardiovascular: Negative. Gastrointestinal: Negative. Genitourinary: Negative. Musculoskeletal: Negative. Skin: Negative. Neurological: Negative. Endo/Heme/Allergies: Negative. Psychiatric/Behavioral: Negative. Vital Signs:    Blood pressure 122/76, pulse 66, temperature 97.4 °F (36.3 °C), resp. rate 18, height 5' 3\" (1.6 m), weight 113.1 kg (249 lb 5.4 oz), SpO2 95 %. Body mass index is 44.17 kg/(m^2). O2 Device: Nasal cannula   O2 Flow Rate (L/min): 3 l/min   Temp (24hrs), Av.3 °F (36.3 °C), Min:96 °F (35.6 °C), Max:98.2 °F (36.8 °C)       Intake/Output:   Last shift:         Last 3 shifts:  1901 -  0700  In: 9022 [P.O.:1440; I.V.:100]  Out: 1800 [Urine:1800]    Intake/Output Summary (Last 24 hours) at 17 1135  Last data filed at 02/10/17 2229   Gross per 24 hour   Intake             1390 ml   Output             1000 ml   Net              390 ml       Physical Exam   Constitutional: She is oriented to person, place, and time. She appears well-developed. HENT:   Head: Normocephalic and atraumatic. Eyes: EOM are normal. Pupils are equal, round, and reactive to light. Neck: Normal range of motion. Neck supple. Cardiovascular: Normal rate and regular rhythm. Pulmonary/Chest: Effort normal. She has no wheezes. Moderate air entry on both side    Abdominal: Soft. Musculoskeletal: Normal range of motion. She exhibits no edema. Neurological: She is alert and oriented to person, place, and time. Skin: Skin is warm and dry. Psychiatric: She has a normal mood and affect.  Her behavior is normal.       DATA:   Current Facility-Administered Medications   Medication Dose Route Frequency    insulin glargine (LANTUS) injection 30 Units  30 Units SubCUTAneous DAILY    insulin lispro (HUMALOG) injection 10 Units  10 Units SubCUTAneous TIDAC    insulin lispro (HUMALOG) injection   SubCUTAneous AC&HS    guaiFENesin SR (MUCINEX) tablet 600 mg  600 mg Oral BID    albuterol-ipratropium (DUO-NEB) 2.5 MG-0.5 MG/3 ML  3 mL Nebulization Q4H PRN    arformoterol (BROVANA) neb solution 15 mcg  15 mcg Nebulization BID RT    budesonide (PULMICORT) 500 mcg/2 ml nebulizer suspension  500 mcg Nebulization BID RT    methylPREDNISolone (PF) (SOLU-MEDROL) injection 40 mg  40 mg IntraVENous Q6H    montelukast (SINGULAIR) tablet 10 mg  10 mg Oral QHS    enoxaparin (LOVENOX) injection 40 mg  40 mg SubCUTAneous Q24H    acetaminophen (TYLENOL) tablet 650 mg  650 mg Oral Q4H PRN    levoFLOXacin (LEVAQUIN) 750 mg in D5W IVPB  750 mg IntraVENous Q24H    glucose chewable tablet 16 g  4 Tab Oral PRN    glucagon (GLUCAGEN) injection 1 mg  1 mg IntraMUSCular PRN    dextrose (D50W) injection syrg 12.5-25 g  25-50 mL IntraVENous PRN    aspirin delayed-release tablet 81 mg  81 mg Oral DAILY    pantoprazole (PROTONIX) tablet 40 mg  40 mg Oral ACB    lisinopril (PRINIVIL, ZESTRIL) tablet 40 mg  40 mg Oral DAILY                    Labs:  Recent Results (from the past 24 hour(s))   GLUCOSE, POC    Collection Time: 02/10/17  2:04 PM   Result Value Ref Range    Glucose (POC) 186 (H) 70 - 110 mg/dL   GLUCOSE, POC    Collection Time: 02/10/17  3:42 PM   Result Value Ref Range    Glucose (POC) 234 (H) 70 - 110 mg/dL   GLUCOSE, POC    Collection Time: 02/10/17  5:40 PM   Result Value Ref Range    Glucose (POC) 246 (H) 70 - 110 mg/dL   GLUCOSE, POC    Collection Time: 02/10/17  9:54 PM   Result Value Ref Range    Glucose (POC) 197 (H) 70 - 110 mg/dL   CBC WITH AUTOMATED DIFF    Collection Time: 02/11/17  3:18 AM   Result Value Ref Range    WBC 9.1 4.6 - 13.2 K/uL    RBC 4.10 (L) 4.20 - 5.30 M/uL    HGB 12.4 12.0 - 16.0 g/dL    HCT 37.8 35.0 - 45.0 %    MCV 92.2 74.0 - 97.0 FL    MCH 30.2 24.0 - 34.0 PG    MCHC 32.8 31.0 - 37.0 g/dL    RDW 13.8 11.6 - 14.5 %    PLATELET 468 386 - 239 K/uL    MPV 9.4 9.2 - 11.8 FL    NEUTROPHILS 86 (H) 42 - 75 %    BAND NEUTROPHILS 4 0 - 5 %    LYMPHOCYTES 5 (L) 20 - 51 %    MONOCYTES 5 2 - 9 %    EOSINOPHILS 0 0 - 5 %    BASOPHILS 0 0 - 3 %    ABS. NEUTROPHILS 8.1 (H) 1.8 - 8.0 K/UL    ABS. LYMPHOCYTES 0.5 (L) 0.8 - 3.5 K/UL    ABS. MONOCYTES 0.5 0 - 1.0 K/UL    ABS. EOSINOPHILS 0.0 0.0 - 0.4 K/UL    ABS.  BASOPHILS 0.0 0.0 - 0.06 K/UL    DF MANUAL      PLATELET COMMENTS ADEQUATE PLATELETS      RBC COMMENTS NORMOCYTIC, NORMOCHROMIC     METABOLIC PANEL, BASIC    Collection Time: 02/11/17  3:18 AM   Result Value Ref Range    Sodium 136 136 - 145 mmol/L    Potassium 4.2 3.5 - 5.5 mmol/L    Chloride 97 (L) 100 - 108 mmol/L    CO2 29 21 - 32 mmol/L    Anion gap 10 3.0 - 18 mmol/L    Glucose 237 (H) 74 - 99 mg/dL    BUN 21 (H) 7.0 - 18 MG/DL    Creatinine 0.88 0.6 - 1.3 MG/DL    BUN/Creatinine ratio 24 (H) 12 - 20      GFR est AA >60 >60 ml/min/1.73m2    GFR est non-AA >60 >60 ml/min/1.73m2    Calcium 9.9 8.5 - 10.1 MG/DL   GLUCOSE, POC    Collection Time: 02/11/17  8:44 AM   Result Value Ref Range    Glucose (POC) 256 (H) 70 - 110 mg/dL           Recent Labs      02/10/17   0454  02/08/17   2216   FIO2I   --   21   HCO3I  28.3*  23.5   PCO2I  48.8*  40.7   PHI  7.372  7.370   PO2I  131*  66*     All Micro Results     Procedure Component Value Units Date/Time    CULTURE, BLOOD [[id number]] Collected:  02/08/17 1645    Order Status:  Completed Specimen:  Whole Blood from Blood Updated:  02/11/17 0760     Special Requests: NO SPECIAL REQUESTS        Culture result: NO GROWTH 3 DAYS       CULTURE, BLOOD [[id number]] Collected:  02/08/17 1614    Order Status:  Completed Specimen:  Whole Blood from Blood Updated:  02/11/17 0747     Special Requests: NO SPECIAL REQUESTS        Culture result: NO GROWTH 3 DAYS       CULTURE, RESPIRATORY/SPUTUM/BRONCH Callie Anabela STAIN [[id number]] Collected: 02/09/17 1711    Order Status:  Completed Specimen:  Sputum from Sputum Updated:  02/10/17 1105     Special Requests: NO SPECIAL REQUESTS        GRAM STAIN >25 WBC/lpf         <10 EPI/lpf         MUCUS PRESENT         FEW  GRAM POSITIVE COCCI  IN PAIRS         RARE  GRAM POSITIVE RODS         RARE  GRAM NEGATIVE RODS        Culture result: MODERATE  NORMAL RESPIRATORY TOAN                 Imaging:  [x]I have personally reviewed the patients chest radiographs images and report with the patient      Results from [place] encounter on 02/08/17   XR CHEST PORT   Narrative Procedure:  Chest portable. Indication:  Shortness of breath. Comparison:  9/18/16, 6/3/15. Findings: An obliquely oriented bandlike density is identified in the right  lung base. A similar density was noted on 9/18/2016 and 6/3/2015. A focus of  hazy density with circumscribed margins is identified in the left lower lung  zone adjacent to the cardiac apex. This density is found to be related to a  pericardial fat pad on 6/3/15. No new infiltrate or consolidation is  identified. The cardiac silhouette is mildly prominent. Impression Impression:    1. Stable bilateral lower lung zone densities. 2.  No new infiltrate or consolidation. Results from [place] encounter on 09/05/13   CTA CHEST W WO CONT   Narrative CTA Chest With Contrast Pulmonary Arterial Exam With Maximum Intensity  Projections (MIPs)    CPT CODE: 21774    INDICATION:    , H/P ASTHMA AND COPD-NOW WITH UNRESLVING SOB-CHEST  PRESSURE/HEAVINESS-EVALUATE FOR MASS AND PE.  coronal and sagittal MIPs  were obtained to better evaluate the pulmonary  arteries in a three dimensional angiographic method to evaluate for possible  pulmonary emboli    COMPARISON: CT chest March 19, 2010.     TECHNIQUE: Initial localizing images were obtained without intravenous  contrast. Standard helical images were obtained from the thoracic inlet through  the adrenals at 2.5 mm thick sections following the intravenous injection of a  bolus of 100 cc nonionic contrast.  Patient was premedicated with the Radin  steroid prep. Images were reviewed on both soft tissue, lung, and bone window  settings. FINDINGS:    The quality of opacification of the pulmonary arteries is insufficient to  exclude peripheral PE. There are no filling defects within the right and left  main pulmonary arteries. There is no evidence of mediastinal, hilar, nor axillary adenopathy. The great vessels and thoracic aorta are unremarkable. There are no pleural effusions. The lungs are well inflated. Minimal linear scarring within the right middle  lobe. .    The included portion of the of the liver is unremarkable. The adrenal glands  are normal.    The chest wall soft tissues are unremarkable. Reconstructions: Coronal and sagittal MIPs ( maximum intensity projections)  were obtained from the axial acquisition. The pulmonary arteries branch  normally. There is insufficient opacification to exclude filling defects. Impression IMPRESSION:    There is insufficient contrast and increased mottle within the pulmonary  arteries to exclude PE. Recommend followup VQ scan. Wet reading, orange alert provided to Dr. Sil Carranza   at 1341 hrs by  Nestor Kyle.          [x]See my orders for details    My assessment, plan of care, findings, medications, side effects etc were discussed with:  [x]nursing []PT/OT    []respiratory therapy []Dr. Trev Lane []Patient       Edgar Arteaga MD, CHRISTIANE

## 2017-02-11 NOTE — ROUTINE PROCESS
Bedside and Verbal shift change report given to EDUARDO RN (oncoming nurse) by Tree Colon RN (offgoing nurse). Report included the following information SBAR, Kardex and MAR.

## 2017-02-12 LAB
25(OH)D3 SERPL-MCNC: 6.5 NG/ML (ref 30–100)
ANION GAP BLD CALC-SCNC: 7 MMOL/L (ref 3–18)
BASOPHILS # BLD AUTO: 0 K/UL (ref 0–0.1)
BASOPHILS # BLD: 0 % (ref 0–2)
BUN SERPL-MCNC: 23 MG/DL (ref 7–18)
BUN/CREAT SERPL: 24 (ref 12–20)
CALCIUM SERPL-MCNC: 9.6 MG/DL (ref 8.5–10.1)
CHLORIDE SERPL-SCNC: 99 MMOL/L (ref 100–108)
CO2 SERPL-SCNC: 30 MMOL/L (ref 21–32)
CREAT SERPL-MCNC: 0.97 MG/DL (ref 0.6–1.3)
DIFFERENTIAL METHOD BLD: ABNORMAL
EOSINOPHIL # BLD: 0 K/UL (ref 0–0.4)
EOSINOPHIL NFR BLD: 0 % (ref 0–5)
ERYTHROCYTE [DISTWIDTH] IN BLOOD BY AUTOMATED COUNT: 13.3 % (ref 11.6–14.5)
FOLATE SERPL-MCNC: 11.9 NG/ML (ref 3.1–17.5)
GLUCOSE BLD STRIP.AUTO-MCNC: 160 MG/DL (ref 70–110)
GLUCOSE BLD STRIP.AUTO-MCNC: 161 MG/DL (ref 70–110)
GLUCOSE BLD STRIP.AUTO-MCNC: 203 MG/DL (ref 70–110)
GLUCOSE BLD STRIP.AUTO-MCNC: 244 MG/DL (ref 70–110)
GLUCOSE SERPL-MCNC: 208 MG/DL (ref 74–99)
HCT VFR BLD AUTO: 39.6 % (ref 35–45)
HGB BLD-MCNC: 13.1 G/DL (ref 12–16)
LYMPHOCYTES # BLD AUTO: 11 % (ref 21–52)
LYMPHOCYTES # BLD: 0.9 K/UL (ref 0.9–3.6)
MCH RBC QN AUTO: 30.3 PG (ref 24–34)
MCHC RBC AUTO-ENTMCNC: 33.1 G/DL (ref 31–37)
MCV RBC AUTO: 91.5 FL (ref 74–97)
MONOCYTES # BLD: 0.6 K/UL (ref 0.05–1.2)
MONOCYTES NFR BLD AUTO: 8 % (ref 3–10)
NEUTS SEG # BLD: 6.6 K/UL (ref 1.8–8)
NEUTS SEG NFR BLD AUTO: 81 % (ref 40–73)
PLATELET # BLD AUTO: 332 K/UL (ref 135–420)
PMV BLD AUTO: 9.4 FL (ref 9.2–11.8)
POTASSIUM SERPL-SCNC: 4 MMOL/L (ref 3.5–5.5)
RBC # BLD AUTO: 4.33 M/UL (ref 4.2–5.3)
SODIUM SERPL-SCNC: 136 MMOL/L (ref 136–145)
VIT B12 SERPL-MCNC: 524 PG/ML (ref 211–911)
WBC # BLD AUTO: 8.1 K/UL (ref 4.6–13.2)

## 2017-02-12 PROCEDURE — 94660 CPAP INITIATION&MGMT: CPT

## 2017-02-12 PROCEDURE — 85025 COMPLETE CBC W/AUTO DIFF WBC: CPT | Performed by: FAMILY MEDICINE

## 2017-02-12 PROCEDURE — 82607 VITAMIN B-12: CPT | Performed by: FAMILY MEDICINE

## 2017-02-12 PROCEDURE — 94640 AIRWAY INHALATION TREATMENT: CPT

## 2017-02-12 PROCEDURE — 65660000000 HC RM CCU STEPDOWN

## 2017-02-12 PROCEDURE — 77010033678 HC OXYGEN DAILY

## 2017-02-12 PROCEDURE — 74011636637 HC RX REV CODE- 636/637: Performed by: INTERNAL MEDICINE

## 2017-02-12 PROCEDURE — 82962 GLUCOSE BLOOD TEST: CPT

## 2017-02-12 PROCEDURE — 82306 VITAMIN D 25 HYDROXY: CPT | Performed by: INTERNAL MEDICINE

## 2017-02-12 PROCEDURE — 36415 COLL VENOUS BLD VENIPUNCTURE: CPT | Performed by: FAMILY MEDICINE

## 2017-02-12 PROCEDURE — 74011250637 HC RX REV CODE- 250/637: Performed by: INTERNAL MEDICINE

## 2017-02-12 PROCEDURE — 74011000250 HC RX REV CODE- 250: Performed by: INTERNAL MEDICINE

## 2017-02-12 PROCEDURE — 74011250636 HC RX REV CODE- 250/636: Performed by: INTERNAL MEDICINE

## 2017-02-12 PROCEDURE — 74011636637 HC RX REV CODE- 636/637: Performed by: FAMILY MEDICINE

## 2017-02-12 PROCEDURE — 74011250637 HC RX REV CODE- 250/637: Performed by: FAMILY MEDICINE

## 2017-02-12 PROCEDURE — 80048 BASIC METABOLIC PNL TOTAL CA: CPT | Performed by: FAMILY MEDICINE

## 2017-02-12 PROCEDURE — 74011250636 HC RX REV CODE- 250/636: Performed by: FAMILY MEDICINE

## 2017-02-12 RX ORDER — INSULIN LISPRO 100 [IU]/ML
16 INJECTION, SOLUTION INTRAVENOUS; SUBCUTANEOUS
Status: DISCONTINUED | OUTPATIENT
Start: 2017-02-12 | End: 2017-02-13

## 2017-02-12 RX ORDER — INSULIN GLARGINE 100 [IU]/ML
60 INJECTION, SOLUTION SUBCUTANEOUS DAILY
Status: DISCONTINUED | OUTPATIENT
Start: 2017-02-13 | End: 2017-02-13

## 2017-02-12 RX ADMIN — LISINOPRIL 40 MG: 20 TABLET ORAL at 09:03

## 2017-02-12 RX ADMIN — INSULIN LISPRO 14 UNITS: 100 INJECTION, SOLUTION INTRAVENOUS; SUBCUTANEOUS at 09:02

## 2017-02-12 RX ADMIN — ARFORMOTEROL TARTRATE 15 MCG: 15 SOLUTION RESPIRATORY (INHALATION) at 07:58

## 2017-02-12 RX ADMIN — ASPIRIN 81 MG: 81 TABLET, COATED ORAL at 09:03

## 2017-02-12 RX ADMIN — INSULIN LISPRO 3 UNITS: 100 INJECTION, SOLUTION INTRAVENOUS; SUBCUTANEOUS at 21:53

## 2017-02-12 RX ADMIN — BUDESONIDE 500 MCG: 0.5 INHALANT RESPIRATORY (INHALATION) at 19:12

## 2017-02-12 RX ADMIN — GUAIFENESIN 600 MG: 600 TABLET, EXTENDED RELEASE ORAL at 09:03

## 2017-02-12 RX ADMIN — INSULIN LISPRO 6 UNITS: 100 INJECTION, SOLUTION INTRAVENOUS; SUBCUTANEOUS at 09:02

## 2017-02-12 RX ADMIN — LEVOFLOXACIN 750 MG: 5 INJECTION, SOLUTION INTRAVENOUS at 13:19

## 2017-02-12 RX ADMIN — GUAIFENESIN 600 MG: 600 TABLET, EXTENDED RELEASE ORAL at 21:53

## 2017-02-12 RX ADMIN — PREDNISONE 40 MG: 20 TABLET ORAL at 09:03

## 2017-02-12 RX ADMIN — INSULIN LISPRO 16 UNITS: 100 INJECTION, SOLUTION INTRAVENOUS; SUBCUTANEOUS at 17:39

## 2017-02-12 RX ADMIN — INSULIN GLARGINE 50 UNITS: 100 INJECTION, SOLUTION SUBCUTANEOUS at 09:01

## 2017-02-12 RX ADMIN — INSULIN LISPRO 14 UNITS: 100 INJECTION, SOLUTION INTRAVENOUS; SUBCUTANEOUS at 11:42

## 2017-02-12 RX ADMIN — PANTOPRAZOLE SODIUM 40 MG: 40 TABLET, DELAYED RELEASE ORAL at 09:03

## 2017-02-12 RX ADMIN — INSULIN LISPRO 6 UNITS: 100 INJECTION, SOLUTION INTRAVENOUS; SUBCUTANEOUS at 11:43

## 2017-02-12 RX ADMIN — ARFORMOTEROL TARTRATE 15 MCG: 15 SOLUTION RESPIRATORY (INHALATION) at 19:12

## 2017-02-12 RX ADMIN — MONTELUKAST SODIUM 10 MG: 10 TABLET, FILM COATED ORAL at 21:53

## 2017-02-12 RX ADMIN — BUDESONIDE 500 MCG: 0.5 INHALANT RESPIRATORY (INHALATION) at 07:58

## 2017-02-12 RX ADMIN — ENOXAPARIN SODIUM 40 MG: 100 INJECTION SUBCUTANEOUS at 17:39

## 2017-02-12 RX ADMIN — INSULIN LISPRO 3 UNITS: 100 INJECTION, SOLUTION INTRAVENOUS; SUBCUTANEOUS at 17:37

## 2017-02-12 RX ADMIN — Medication 50000 UNITS: at 13:17

## 2017-02-12 NOTE — PROGRESS NOTES
CDMP Query:    Diagnosis--  Mild intermittent asthma/acute COPD exacerbation        Kiah Tarango MD  PGY2, [facility]

## 2017-02-12 NOTE — PROGRESS NOTES
PFM Daily Progress Note  DOA: 2/8/2017  7:09 AM  DOS: 2/12/2017  LOS: 4 days    CC: COPD Exacerbation    24-hour events  No acute events overnight. Received BIPAP overnight. Subjective  Ms. Steph Dumont reports that she is feeling much better this morning. She does admit continued wheezing and chest tightness in a band like distribution around her chest associated with coughing.   She denies fever, chills, N/V, abdominal pain,     Medications  Current Facility-Administered Medications   Medication Dose Route Frequency    predniSONE (DELTASONE) tablet 40 mg  40 mg Oral DAILY WITH BREAKFAST    insulin glargine (LANTUS) injection 50 Units  50 Units SubCUTAneous DAILY    insulin lispro (HUMALOG) injection 14 Units  14 Units SubCUTAneous TIDAC    insulin lispro (HUMALOG) injection   SubCUTAneous AC&HS    guaiFENesin SR (MUCINEX) tablet 600 mg  600 mg Oral BID    albuterol-ipratropium (DUO-NEB) 2.5 MG-0.5 MG/3 ML  3 mL Nebulization Q4H PRN    arformoterol (BROVANA) neb solution 15 mcg  15 mcg Nebulization BID RT    budesonide (PULMICORT) 500 mcg/2 ml nebulizer suspension  500 mcg Nebulization BID RT    montelukast (SINGULAIR) tablet 10 mg  10 mg Oral QHS    enoxaparin (LOVENOX) injection 40 mg  40 mg SubCUTAneous Q24H    acetaminophen (TYLENOL) tablet 650 mg  650 mg Oral Q4H PRN    levoFLOXacin (LEVAQUIN) 750 mg in D5W IVPB  750 mg IntraVENous Q24H    glucose chewable tablet 16 g  4 Tab Oral PRN    glucagon (GLUCAGEN) injection 1 mg  1 mg IntraMUSCular PRN    dextrose (D50W) injection syrg 12.5-25 g  25-50 mL IntraVENous PRN    aspirin delayed-release tablet 81 mg  81 mg Oral DAILY    pantoprazole (PROTONIX) tablet 40 mg  40 mg Oral ACB    lisinopril (PRINIVIL, ZESTRIL) tablet 40 mg  40 mg Oral DAILY       Objective  Patient Vitals for the past 24 hrs:   Temp Pulse Resp BP SpO2   02/12/17 0400 96 °F (35.6 °C) 61 18 128/78 99 %   02/12/17 0000 96.9 °F (36.1 °C) 70 20 150/81 97 %   02/11/17 2000 98.2 °F (36.8 °C) 77 18 147/86 94 %   17 1647 98.1 °F (36.7 °C) 68 16 159/90 95 %   17 1214 97.4 °F (36.3 °C) 73 16 119/73 95 %   17 0846 97.4 °F (36.3 °C) 66 18 122/76 95 %     95% O2 by 3L NC    Exam  General: A&Ox3, in no distress  Lungs: end expiratory wheezing throughout lung fields, no rhonchi or crackles  Heart: RRR, no m/r/g  Abdomen: NABS, non-tender, nondistended  Extremities: no edema  Skin: warm, dry, no rashes    Labs  Recent Labs      17   0352   WBC  8.1   HGB  13.1   HCT  39.6   PLT  332   MCV  91.5   RDW  13.3     Lab Results   Component Value Date/Time    Sodium 136 2017 03:52 AM    Potassium 4.0 2017 03:52 AM    Chloride 99 2017 03:52 AM    CO2 30 2017 03:52 AM    Anion gap 7 2017 03:52 AM    Glucose 208 2017 03:52 AM    BUN 23 2017 03:52 AM    Creatinine 0.97 2017 03:52 AM    BUN/Creatinine ratio 24 2017 03:52 AM    GFR est AA >60 2017 03:52 AM    GFR est non-AA 59 2017 03:52 AM    Calcium 9.6 2017 03:52 AM    AST (SGOT) 16 2017 10:00 PM    Alk. phosphatase 81 2017 10:00 PM    Protein, total 7.5 2017 10:00 PM    Albumin 3.5 2017 10:00 PM    Globulin 4.0 2017 10:00 PM    A-G Ratio 0.9 2017 10:00 PM    ALT (SGPT) 52 2017 10:00 PM     Microbiology:    Bcx x 2: NG x 3 days   Sputum Cx: moderate normal respiratory samina    Imagin/10 LE PVL: neg for DVT   CXR: stable bilateral lower lung zone densities, no new infiltrate or density    Assessment/Plan  Samantha Spangler is a 61 y.o. female with a h/o COPD, T2DM, HTN, GERD who was admitted for COPD exacerbation. COPD Exacerbation   --Improving  --Switching to PO Prednisone 40 mg Daily this morning from IV solu-medrol  --Appreciate Pulmonary recommendations  --Wean oxygen to keep O2 Sats >92%  --IV Levaquin Day 4  --Continue brovana BID, pulmicort BID.   Duo-neb PRN    T2DM  --Poorly controlled with A1C 8.0 on admission  --BG ranging 191-287 over last 24 hours  --Appreciate Endocrine recommendations  --Lantus increased to 50 Units daily and prandial insulin increased to 14 Units yesterday  --Plan to resume home metformin on discharge    HTN  --BP well controlled over last 24 hours ranging 119-150/73-86  --Continue Lisinopril    Dispo;  Anticipate discharge home today or tomorrow  [id number] Rolando Morris MD 2/12/2017 7:02 AM   Pager #[phone]

## 2017-02-12 NOTE — ROUTINE PROCESS
Bedside shift change report given to [address], [address], RN (oncoming nurse) by Regis Waggoner RN (offgoing nurse). Report included the following information MEHREEN, Shayy and MAR.     1936 - Bedside shift change report given to Pablo Reeder RN (oncoming nurse) by Cipriano Canavan, RN (offgoing nurse). Report included the following information SBAR, Kardex and MAR.

## 2017-02-12 NOTE — PROGRESS NOTES
BReathing is better. Prednisone switched from IV to oral. Glucose still mainly above 200. Plan: increase Lantus to 60 units every morning           Increase prandial dose of Humalog to 16 units before each meal.            Begin Januvia, 100 mg po daily.

## 2017-02-12 NOTE — ROUTINE PROCESS
Bedside and Verbal shift change report given to EDUARDO RN (oncoming nurse) by Keisha Bob RN (offgoing nurse). Report included the following information SBAR, Kardex and MAR.

## 2017-02-12 NOTE — CONSULTS
[address]    Name:  Oscar Read  MR#:  [id number]  :  1960  Account #:  [de-identified]  Date of Adm:  2017  Date of Consultation:  2017      REASON FOR CONSULTATION: This 70-year-old woman presented  to the hospital with dyspnea. She has diabetes, and consultation was  made for assistance in its management. HISTORY OF PRESENT ILLNESS: The patient had been having  wheezing, cough, and shortness of breath for many days. She has  chronic obstructive pulmonary disease, and asthma, and she had  developed a cough, and shortness of breath worsened, so she came to  the emergency room and was admitted. PAST HISTORY: She has had diabetes for at least 5 years. Her  treatment has been on metformin alone 500 mg twice a day. She has  been managed at [address] [place] [id number] for this. Her blood  glucoses have usually been in the 100s, and her A1c levels have  easily been below 7, but most recently about 3 months ago an A1c  was in the low 7's. She has had to take many courses of prednisone  over the last 20 years. This has lead to weight gain, diabetes, and  hyperglycemia. She says the blood sugars have been higher over the  last 3 months than they were previously. She admits to numbness, tingling, and sharp shooting pains in her feet. This made ambulation difficult. She has had more than 100 pounds of  weight gain in the last 10 years. SOCIAL HISTORY: The patient is  from her . She  has been living with her son. When discharged from the hospital, will  be living with her daughter. She is on disability for obstructive  pulmonary disease. She was a smoker until 11 years ago. PHYSICAL EXAMINATION  GENERAL: The patient is examined sitting on the side of her bed. She  is only slightly dyspneic at rest. She is able to talk and answer  questions coherently.   VITAL SIGNS: Pulse is 60 to 86, respiratory rate 16 to 18, blood  pressure 119/73, O2 saturation is 95%, weight is 113 kilograms, height  5 feet 3 inches. HEENT: There is moderate edema of the eyelids. Eyes show moderate  arcus. Pupils are equal and reactive. CHEST: Reveals bilateral wheezing. BREASTS: Pendulous. ABDOMEN: Obese. EXTREMITIES: Moderately pale. The feet are hairless. Dorsalis pedis  pulses are present. There is decreased sensation in the feet. LABORATORY DATA: On admission, the blood glucose was 245,  sodium 139, potassium 3.5, chloride 100, CO2 30, creatinine 1.0, GFR  55, CPK 73, A1c 8.0. Troponin less than 0.02. Lactic acid was elevated  at 3.9 (normal 0.4 to 2.0). HOSPITAL COURSE: The patient was placed on nebulizers and high-  dose prednisone to control her exacerbation of COPD. Subsequent  blood sugars were 262 on the second day and 280 yesterday. Bedside  blood glucose levels yesterday were 276, 318, 186, 254, and the  patient was placed yesterday on Lantus insulin given in mid morning  30 units, and a dose of prandial insulin 10 units before each meal, and  a correction dose on top of that based on a sliding scale. Subsequently, the blood sugars were 186 at 1404, then 234 at 1542,  and before supper 246, at bedtime 197. Bedside blood glucose this  morning was 256, and before lunch 284. ASSESSMENT: Type 2 diabetes with exacerbation due to prednisone  therapy. DISCUSSION: I do not think the patient had lactic acidosis even  Though she had an elevated lactate level. This could simply be  due to the hypoxia associated with the chronic obstructive pulmonary  disease. Her pH has been normal throughout and her bicarbonate has  also been normal. She will, of course, need insulin during the  hospitalization and subsequently this should be a 3 component  regimen including long-acting, prandial, and correction doses. Glucose  has not come down much with starting the present doses so  increases will be in order. PLAN  1.  Increase Lantus insulin to 50 units every morning. 2. Increase prandial dose to 14 units before each meal.  3. Continue correction dose. The above changes should help improve the blood sugar in the  hospital. Some oral agents may be started in the next day or two prior to  discharge to transition her to the appropriate home regimen. Thank you for consulting us in her care.         MD Elizabeth Cruz / Brad.Justin  D:  02/11/2017   14:33  T:  02/11/2017   21:17  Job #:  [id number]

## 2017-02-12 NOTE — PROGRESS NOTES
[facility]   Pulmonary and Sleep Medicine     Pulmonary Progress Note    Name: Gene Rivers   : 1960   MRN: [id number]   Date: 2017    [x]I have reviewed the flowsheet and previous days notes. Events, vitals, medications and notes from last 24 hours reviewed. Care plan discussed with nursing and  patient. IMPRESSION:   · COPD exacerbation with improved wheezing, mild hypoxia and improved work of breathing - no clinical or radiological evidence of pneumonia and etiology to explain patient's symptoms, negative D Dimer- doubt VTE. Patient with moderate to severe COPD at baseline - improved overall clinically  · Elevated lactate - resolved -most likely related to increased work of breathing and respiratory muscle fatigue  · Morbid obesity, SHERRIE - on CPAP at home  · Hx of hypertension, chronic diastolic CHF  · Hx of GERD  · Hx of diverticulosis  · Former nicotine dependence    PLAN:   Please use BiPAP routinely  at night, and on PRN basis  during day- adjust settings per goal SPO2> 88%. May eventually transition to home CPAP   Supplemental O2 via NC when off of BiPAP, titrate flow for goal SPO2> 88%  Continue bronchodilators- Brovana and Pulmicort for patient's ease of use- may switch to Advair HFA at DC home  Steroids - Continue tapering dose prednisone   Antibiotic choice: Levaquin - for five days (last dose 17)   Sputum culture if sample available  Aspiration prevention bundle, head of the bed at 30' all times, pulmonary hygiene care  Glycemic control as needed while on steroids  Stress ulcer prophylaxis  DVT prophylaxis  Will defer respective systems problem management to primary and other consultant and follow patient with primary and other team. Cameron Solo to transfer to tele  Further recommendations will be based on the patient's response to recommended treatment and results of the investigation ordered.                Subjective:  Patient with COPD presented with COPD exacerbation. Reports interval improvement. Review of Systems   Constitutional: Negative. HENT: Negative. Eyes: Negative. Cardiovascular: Negative. Gastrointestinal: Negative. Genitourinary: Negative. Musculoskeletal: Negative. Skin: Negative. Neurological: Negative. Endo/Heme/Allergies: Negative. Psychiatric/Behavioral: Negative. Vital Signs:    Blood pressure 136/76, pulse 72, temperature 98.5 °F (36.9 °C), resp. rate 16, height 5' 3\" (1.6 m), weight 113 kg (249 lb 1.9 oz), SpO2 94 %. Body mass index is 44.13 kg/(m^2). O2 Device: Nasal cannula   O2 Flow Rate (L/min): 3 l/min   Temp (24hrs), Av.6 °F (36.4 °C), Min:96 °F (35.6 °C), Max:98.5 °F (36.9 °C)       Intake/Output:   Last shift:         Last 3 shifts: 02/10 1901 -  0700  In: 480 [P.O.:480]  Out: 1100 [Urine:1100]    Intake/Output Summary (Last 24 hours) at 17 1212  Last data filed at 17 0600   Gross per 24 hour   Intake              240 ml   Output             1100 ml   Net             -860 ml       Physical Exam   Constitutional: She is oriented to person, place, and time. She appears well-developed. HENT:   Head: Normocephalic and atraumatic. Eyes: EOM are normal. Pupils are equal, round, and reactive to light. Neck: Normal range of motion. Neck supple. Cardiovascular: Normal rate and regular rhythm. Pulmonary/Chest: Effort normal. She has no wheezes. Moderate air entry on both side    Abdominal: Soft. Musculoskeletal: Normal range of motion. She exhibits no edema. Neurological: She is alert and oriented to person, place, and time. Skin: Skin is warm and dry. Psychiatric: She has a normal mood and affect.  Her behavior is normal.       DATA:   Current Facility-Administered Medications   Medication Dose Route Frequency    [START ON 2017] SITagliptin (JANUVIA) tablet 100 mg  100 mg Oral DAILY    [START ON 2017] insulin glargine (LANTUS) injection 60 Units  60 Units SubCUTAneous DAILY    insulin lispro (HUMALOG) injection 16 Units  16 Units SubCUTAneous TIDAC    cholecalciferol (vitamin D3) (VITAMIN D3) wafer 50,000 Units  50,000 Units Oral DAILY    predniSONE (DELTASONE) tablet 40 mg  40 mg Oral DAILY WITH BREAKFAST    insulin lispro (HUMALOG) injection   SubCUTAneous AC&HS    guaiFENesin SR (MUCINEX) tablet 600 mg  600 mg Oral BID    albuterol-ipratropium (DUO-NEB) 2.5 MG-0.5 MG/3 ML  3 mL Nebulization Q4H PRN    arformoterol (BROVANA) neb solution 15 mcg  15 mcg Nebulization BID RT    budesonide (PULMICORT) 500 mcg/2 ml nebulizer suspension  500 mcg Nebulization BID RT    montelukast (SINGULAIR) tablet 10 mg  10 mg Oral QHS    enoxaparin (LOVENOX) injection 40 mg  40 mg SubCUTAneous Q24H    acetaminophen (TYLENOL) tablet 650 mg  650 mg Oral Q4H PRN    levoFLOXacin (LEVAQUIN) 750 mg in D5W IVPB  750 mg IntraVENous Q24H    glucose chewable tablet 16 g  4 Tab Oral PRN    glucagon (GLUCAGEN) injection 1 mg  1 mg IntraMUSCular PRN    dextrose (D50W) injection syrg 12.5-25 g  25-50 mL IntraVENous PRN    aspirin delayed-release tablet 81 mg  81 mg Oral DAILY    pantoprazole (PROTONIX) tablet 40 mg  40 mg Oral ACB    lisinopril (PRINIVIL, ZESTRIL) tablet 40 mg  40 mg Oral DAILY                    Labs:  Recent Results (from the past 24 hour(s))   GLUCOSE, POC    Collection Time: 02/11/17 12:14 PM   Result Value Ref Range    Glucose (POC) 284 (H) 70 - 110 mg/dL   GLUCOSE, POC    Collection Time: 02/11/17  4:51 PM   Result Value Ref Range    Glucose (POC) 191 (H) 70 - 110 mg/dL   GLUCOSE, POC    Collection Time: 02/11/17  9:15 PM   Result Value Ref Range    Glucose (POC) 207 (H) 70 - 110 mg/dL   CBC WITH AUTOMATED DIFF    Collection Time: 02/12/17  3:52 AM   Result Value Ref Range    WBC 8.1 4.6 - 13.2 K/uL    RBC 4.33 4.20 - 5.30 M/uL    HGB 13.1 12.0 - 16.0 g/dL    HCT 39.6 35.0 - 45.0 %    MCV 91.5 74.0 - 97.0 FL    MCH 30.3 24.0 - 34.0 PG    MCHC 33.1 31.0 - 37.0 g/dL    RDW 13.3 11.6 - 14.5 %    PLATELET 579 080 - 558 K/uL    MPV 9.4 9.2 - 11.8 FL    NEUTROPHILS 81 (H) 40 - 73 %    LYMPHOCYTES 11 (L) 21 - 52 %    MONOCYTES 8 3 - 10 %    EOSINOPHILS 0 0 - 5 %    BASOPHILS 0 0 - 2 %    ABS. NEUTROPHILS 6.6 1.8 - 8.0 K/UL    ABS. LYMPHOCYTES 0.9 0.9 - 3.6 K/UL    ABS. MONOCYTES 0.6 0.05 - 1.2 K/UL    ABS. EOSINOPHILS 0.0 0.0 - 0.4 K/UL    ABS.  BASOPHILS 0.0 0.0 - 0.1 K/UL    DF AUTOMATED     METABOLIC PANEL, BASIC    Collection Time: 02/12/17  3:52 AM   Result Value Ref Range    Sodium 136 136 - 145 mmol/L    Potassium 4.0 3.5 - 5.5 mmol/L    Chloride 99 (L) 100 - 108 mmol/L    CO2 30 21 - 32 mmol/L    Anion gap 7 3.0 - 18 mmol/L    Glucose 208 (H) 74 - 99 mg/dL    BUN 23 (H) 7.0 - 18 MG/DL    Creatinine 0.97 0.6 - 1.3 MG/DL    BUN/Creatinine ratio 24 (H) 12 - 20      GFR est AA >60 >60 ml/min/1.73m2    GFR est non-AA 59 (L) >60 ml/min/1.73m2    Calcium 9.6 8.5 - 10.1 MG/DL   VITAMIN B12 & FOLATE    Collection Time: 02/12/17  3:52 AM   Result Value Ref Range    Vitamin B12 524 211 - 911 pg/mL    Folate 11.9 3.10 - 17.50 ng/mL   VITAMIN D, 25 HYDROXY    Collection Time: 02/12/17  4:15 AM   Result Value Ref Range    Vitamin D 25-Hydroxy 6.5 (L) 30 - 100 ng/mL   GLUCOSE, POC    Collection Time: 02/12/17  7:54 AM   Result Value Ref Range    Glucose (POC) 203 (H) 70 - 110 mg/dL   GLUCOSE, POC    Collection Time: 02/12/17 11:24 AM   Result Value Ref Range    Glucose (POC) 244 (H) 70 - 110 mg/dL           Recent Labs      02/10/17   0454   HCO3I  28.3*   PCO2I  48.8*   PHI  7.372   PO2I  131*     All Micro Results     Procedure Component Value Units Date/Time    CULTURE, BLOOD [[id number]] Collected:  02/08/17 1645    Order Status:  Completed Specimen:  Whole Blood from Blood Updated:  02/12/17 0717     Special Requests: NO SPECIAL REQUESTS        Culture result: NO GROWTH 4 DAYS       CULTURE, BLOOD [[id number]] Collected:  02/08/17 1614    Order Status:  Completed Specimen: Whole Blood from Blood Updated:  02/12/17 0717     Special Requests: NO SPECIAL REQUESTS        Culture result: NO GROWTH 4 DAYS       CULTURE, RESPIRATORY/SPUTUM/BRONCH Araceli Miranda [[id number]] Collected:  02/09/17 1711    Order Status:  Completed Specimen:  Sputum from Sputum Updated:  02/11/17 1302     Special Requests: NO SPECIAL REQUESTS        GRAM STAIN >25 WBC/lpf         <10 EPI/lpf         MUCUS PRESENT         FEW  GRAM POSITIVE COCCI  IN PAIRS         RARE  GRAM POSITIVE RODS         RARE  GRAM NEGATIVE RODS        Culture result: MODERATE  NORMAL RESPIRATORY TOAN                 Imaging:  [x]I have personally reviewed the patients chest radiographs images and report with the patient      Results from [place] encounter on 02/08/17   XR CHEST PORT   Narrative Procedure:  Chest portable. Indication:  Shortness of breath. Comparison:  9/18/16, 6/3/15. Findings: An obliquely oriented bandlike density is identified in the right  lung base. A similar density was noted on 9/18/2016 and 6/3/2015. A focus of  hazy density with circumscribed margins is identified in the left lower lung  zone adjacent to the cardiac apex. This density is found to be related to a  pericardial fat pad on 6/3/15. No new infiltrate or consolidation is  identified. The cardiac silhouette is mildly prominent. Impression Impression:    1. Stable bilateral lower lung zone densities. 2.  No new infiltrate or consolidation.           Results from Hospital Encounter encounter on 09/05/13   CTA CHEST W WO CONT   Narrative CTA Chest With Contrast Pulmonary Arterial Exam With Maximum Intensity  Projections (MIPs)    CPT CODE: 84237    INDICATION:    , H/P ASTHMA AND COPD-NOW WITH UNRESLVING SOB-CHEST  PRESSURE/HEAVINESS-EVALUATE FOR MASS AND PE.  coronal and sagittal MIPs  were obtained to better evaluate the pulmonary  arteries in a three dimensional angiographic method to evaluate for possible  pulmonary emboli    COMPARISON: CT chest March 19, 2010. TECHNIQUE: Initial localizing images were obtained without intravenous  contrast. Standard helical images were obtained from the thoracic inlet through  the adrenals at 2.5 mm thick sections following the intravenous injection of a  bolus of 100 cc nonionic contrast.  Patient was premedicated with the Radin  steroid prep. Images were reviewed on both soft tissue, lung, and bone window  settings. FINDINGS:    The quality of opacification of the pulmonary arteries is insufficient to  exclude peripheral PE. There are no filling defects within the right and left  main pulmonary arteries. There is no evidence of mediastinal, hilar, nor axillary adenopathy. The great vessels and thoracic aorta are unremarkable. There are no pleural effusions. The lungs are well inflated. Minimal linear scarring within the right middle  lobe. .    The included portion of the of the liver is unremarkable. The adrenal glands  are normal.    The chest wall soft tissues are unremarkable. Reconstructions: Coronal and sagittal MIPs ( maximum intensity projections)  were obtained from the axial acquisition. The pulmonary arteries branch  normally. There is insufficient opacification to exclude filling defects. Impression IMPRESSION:    There is insufficient contrast and increased mottle within the pulmonary  arteries to exclude PE. Recommend followup VQ scan. Wet reading, orange alert provided to Dr. Raffi Degroot   at 1341 hrs by  Cheryle Rhodes.          [x]See my orders for details    My assessment, plan of care, findings, medications, side effects etc were discussed with:  [x]nursing []PT/OT    []respiratory therapy []Dr. Rivas Filter []Patient       Sathya Hernadez MD, CHRISTIANE

## 2017-02-13 LAB
ANION GAP BLD CALC-SCNC: 7 MMOL/L (ref 3–18)
BASOPHILS # BLD AUTO: 0 K/UL (ref 0–0.1)
BASOPHILS # BLD: 0 % (ref 0–2)
BUN SERPL-MCNC: 24 MG/DL (ref 7–18)
BUN/CREAT SERPL: 28 (ref 12–20)
CALCIUM SERPL-MCNC: 9.4 MG/DL (ref 8.5–10.1)
CHLORIDE SERPL-SCNC: 100 MMOL/L (ref 100–108)
CO2 SERPL-SCNC: 32 MMOL/L (ref 21–32)
CREAT SERPL-MCNC: 0.86 MG/DL (ref 0.6–1.3)
DIFFERENTIAL METHOD BLD: NORMAL
EOSINOPHIL # BLD: 0 K/UL (ref 0–0.4)
EOSINOPHIL NFR BLD: 0 % (ref 0–5)
ERYTHROCYTE [DISTWIDTH] IN BLOOD BY AUTOMATED COUNT: 13.3 % (ref 11.6–14.5)
GLUCOSE BLD STRIP.AUTO-MCNC: 151 MG/DL (ref 70–110)
GLUCOSE BLD STRIP.AUTO-MCNC: 236 MG/DL (ref 70–110)
GLUCOSE BLD STRIP.AUTO-MCNC: 73 MG/DL (ref 70–110)
GLUCOSE SERPL-MCNC: 91 MG/DL (ref 74–99)
HCT VFR BLD AUTO: 39.5 % (ref 35–45)
HGB BLD-MCNC: 13.4 G/DL (ref 12–16)
LYMPHOCYTES # BLD AUTO: 36 % (ref 21–52)
LYMPHOCYTES # BLD: 2.6 K/UL (ref 0.9–3.6)
MAGNESIUM SERPL-MCNC: 2.2 MG/DL (ref 1.8–2.4)
MCH RBC QN AUTO: 30.7 PG (ref 24–34)
MCHC RBC AUTO-ENTMCNC: 33.9 G/DL (ref 31–37)
MCV RBC AUTO: 90.4 FL (ref 74–97)
MONOCYTES # BLD: 0.7 K/UL (ref 0.05–1.2)
MONOCYTES NFR BLD AUTO: 10 % (ref 3–10)
NEUTS SEG # BLD: 3.9 K/UL (ref 1.8–8)
NEUTS SEG NFR BLD AUTO: 54 % (ref 40–73)
PLATELET # BLD AUTO: 299 K/UL (ref 135–420)
PMV BLD AUTO: 9.2 FL (ref 9.2–11.8)
POTASSIUM SERPL-SCNC: 3.2 MMOL/L (ref 3.5–5.5)
POTASSIUM SERPL-SCNC: 4.1 MMOL/L (ref 3.5–5.5)
RBC # BLD AUTO: 4.37 M/UL (ref 4.2–5.3)
SODIUM SERPL-SCNC: 139 MMOL/L (ref 136–145)
WBC # BLD AUTO: 7.2 K/UL (ref 4.6–13.2)

## 2017-02-13 PROCEDURE — 74011250637 HC RX REV CODE- 250/637: Performed by: FAMILY MEDICINE

## 2017-02-13 PROCEDURE — 74011636637 HC RX REV CODE- 636/637: Performed by: FAMILY MEDICINE

## 2017-02-13 PROCEDURE — 94640 AIRWAY INHALATION TREATMENT: CPT

## 2017-02-13 PROCEDURE — 84132 ASSAY OF SERUM POTASSIUM: CPT | Performed by: FAMILY MEDICINE

## 2017-02-13 PROCEDURE — 85025 COMPLETE CBC W/AUTO DIFF WBC: CPT | Performed by: FAMILY MEDICINE

## 2017-02-13 PROCEDURE — 65660000000 HC RM CCU STEPDOWN

## 2017-02-13 PROCEDURE — 74011636637 HC RX REV CODE- 636/637: Performed by: INTERNAL MEDICINE

## 2017-02-13 PROCEDURE — 83735 ASSAY OF MAGNESIUM: CPT

## 2017-02-13 PROCEDURE — 74011250637 HC RX REV CODE- 250/637: Performed by: INTERNAL MEDICINE

## 2017-02-13 PROCEDURE — 94760 N-INVAS EAR/PLS OXIMETRY 1: CPT

## 2017-02-13 PROCEDURE — 94660 CPAP INITIATION&MGMT: CPT

## 2017-02-13 PROCEDURE — 82962 GLUCOSE BLOOD TEST: CPT

## 2017-02-13 PROCEDURE — 36415 COLL VENOUS BLD VENIPUNCTURE: CPT | Performed by: FAMILY MEDICINE

## 2017-02-13 PROCEDURE — 74011250636 HC RX REV CODE- 250/636: Performed by: INTERNAL MEDICINE

## 2017-02-13 PROCEDURE — 74011250636 HC RX REV CODE- 250/636: Performed by: FAMILY MEDICINE

## 2017-02-13 PROCEDURE — 74011000250 HC RX REV CODE- 250: Performed by: INTERNAL MEDICINE

## 2017-02-13 PROCEDURE — 77010033678 HC OXYGEN DAILY

## 2017-02-13 RX ORDER — POTASSIUM CHLORIDE 1.5 G/1.77G
20 POWDER, FOR SOLUTION ORAL
Status: DISCONTINUED | OUTPATIENT
Start: 2017-02-13 | End: 2017-02-13

## 2017-02-13 RX ORDER — INSULIN GLARGINE 100 [IU]/ML
55 INJECTION, SOLUTION SUBCUTANEOUS DAILY
Status: DISCONTINUED | OUTPATIENT
Start: 2017-02-14 | End: 2017-02-13

## 2017-02-13 RX ORDER — POTASSIUM CHLORIDE 20 MEQ/1
40 TABLET, EXTENDED RELEASE ORAL
Status: COMPLETED | OUTPATIENT
Start: 2017-02-13 | End: 2017-02-13

## 2017-02-13 RX ORDER — INSULIN GLARGINE 100 [IU]/ML
40 INJECTION, SOLUTION SUBCUTANEOUS DAILY
Status: DISCONTINUED | OUTPATIENT
Start: 2017-02-14 | End: 2017-02-13

## 2017-02-13 RX ORDER — INSULIN GLARGINE 100 [IU]/ML
20 INJECTION, SOLUTION SUBCUTANEOUS DAILY
Status: DISCONTINUED | OUTPATIENT
Start: 2017-02-14 | End: 2017-02-14

## 2017-02-13 RX ADMIN — GUAIFENESIN 600 MG: 600 TABLET, EXTENDED RELEASE ORAL at 20:23

## 2017-02-13 RX ADMIN — LEVOFLOXACIN 750 MG: 5 INJECTION, SOLUTION INTRAVENOUS at 13:49

## 2017-02-13 RX ADMIN — PANTOPRAZOLE SODIUM 40 MG: 40 TABLET, DELAYED RELEASE ORAL at 09:22

## 2017-02-13 RX ADMIN — BUDESONIDE 500 MCG: 0.5 INHALANT RESPIRATORY (INHALATION) at 08:27

## 2017-02-13 RX ADMIN — GUAIFENESIN 600 MG: 600 TABLET, EXTENDED RELEASE ORAL at 09:22

## 2017-02-13 RX ADMIN — ASPIRIN 81 MG: 81 TABLET, COATED ORAL at 09:22

## 2017-02-13 RX ADMIN — INSULIN GLARGINE 60 UNITS: 100 INJECTION, SOLUTION SUBCUTANEOUS at 09:10

## 2017-02-13 RX ADMIN — INSULIN LISPRO 16 UNITS: 100 INJECTION, SOLUTION INTRAVENOUS; SUBCUTANEOUS at 13:15

## 2017-02-13 RX ADMIN — ARFORMOTEROL TARTRATE 15 MCG: 15 SOLUTION RESPIRATORY (INHALATION) at 19:29

## 2017-02-13 RX ADMIN — ARFORMOTEROL TARTRATE 15 MCG: 15 SOLUTION RESPIRATORY (INHALATION) at 08:27

## 2017-02-13 RX ADMIN — IPRATROPIUM BROMIDE AND ALBUTEROL SULFATE 3 ML: .5; 3 SOLUTION RESPIRATORY (INHALATION) at 13:52

## 2017-02-13 RX ADMIN — Medication 50000 UNITS: at 09:21

## 2017-02-13 RX ADMIN — POTASSIUM CHLORIDE 40 MEQ: 1500 TABLET, EXTENDED RELEASE ORAL at 09:12

## 2017-02-13 RX ADMIN — LISINOPRIL 40 MG: 20 TABLET ORAL at 09:22

## 2017-02-13 RX ADMIN — INSULIN LISPRO 6 UNITS: 100 INJECTION, SOLUTION INTRAVENOUS; SUBCUTANEOUS at 17:56

## 2017-02-13 RX ADMIN — ENOXAPARIN SODIUM 40 MG: 100 INJECTION SUBCUTANEOUS at 17:56

## 2017-02-13 RX ADMIN — BUDESONIDE 500 MCG: 0.5 INHALANT RESPIRATORY (INHALATION) at 19:29

## 2017-02-13 RX ADMIN — PREDNISONE 40 MG: 20 TABLET ORAL at 09:21

## 2017-02-13 NOTE — PROGRESS NOTES
Received report on pt.from Luis Alberto off going RN. Resting quietly in bed on rounds. Denies c/o pain or SOB at this time. No acute distress noted. Will cont to monitor for any changes in ststus. 1545 pt ambulated in hallway. 02 sat on room air at rest was 91%. 02 sats with ambulation on room air 88%. Pt had dyspnea and increased wheezing with ambulation. 1700 pt sts that she felt much less SOB and chest tighness from coughing with her 02 on.

## 2017-02-13 NOTE — PROGRESS NOTES
Intern Progress Note  [facility]       Patient: Nishi Lombardi MRN: [id number]  CSN: [id number]    YOB: 1960  Age: 61 y.o. Sex: female    DOA: 2/8/2017 LOS:  LOS: 5 days   PCP: Víctor Chacko MD                 Subjective:     Acute events: No acute events overnight. Pt used BiPAP and tolerated it well. States she feels a lot better than she did before coming to hospital. Still on supplemental O2, was not using at home. Brief ROS: Endorses cough and SOB.  Denies cp, fever, chills, n/v/d    Objective:      Patient Vitals for the past 24 hrs:   Temp Pulse Resp BP SpO2   02/13/17 0348 97.3 °F (36.3 °C) 60 17 125/81 99 %   02/13/17 0325 - - - - 98 %   02/13/17 0125 - - - - 99 %   02/13/17 0000 97.5 °F (36.4 °C) 94 18 152/89 94 %   02/12/17 1956 97.9 °F (36.6 °C) 78 18 151/87 94 %   02/12/17 1913 - - - - 94 %   02/12/17 1616 97.7 °F (36.5 °C) 79 18 138/81 93 %   02/12/17 1119 98.5 °F (36.9 °C) 72 16 136/76 94 %   02/12/17 0755 98.1 °F (36.7 °C) 64 16 127/80 95 %       Physical Exam:   General: alert, well developed and in no apparent distress  Head: NC/AT  Cardiovascular: RRR s MRGs  Respiratory: CTAB w mild wheezing  Abdomen: Soft, +BS, non-TTP, Non-Distended  Extremities: No edema in lower extremities bilaterally, 2+ radial pulses intact bilaterally    Lab/Data Reviewed:    CBC w/Diff Recent Labs      02/13/17   0331  02/12/17   0352  02/11/17   0318   WBC  7.2  8.1  9.1   RBC  4.37  4.33  4.10*   HGB  13.4  13.1  12.4   HCT  39.5  39.6  37.8   PLT  299  332  319   GRANS  54  81*  86*   LYMPH  36  11*  5*   EOS  0  0  0      Chemistry Recent Labs      02/13/17   0418  02/13/17   0331  02/12/17 2109   02/12/17   0352   02/11/17 0318   GLUCPOC   --    --   161*   < >   --    < >   --    GLU   --   91   --    --   208*   --   237*   NA   --   139   --    --   136   --   136   K   --   3.2*   --    --   4.0   --   4.2   CL   --   100   --    --   99*   --   97*   CO2 --   32   --    --   30   --   29   BUN   --   24*   --    --   23*   --   21*   CREA   --   0.86   --    --   0.97   --   0.88   CA   --   9.4   --    --   9.6   --   9.9   MG  2.2   --    --    --    --    --    --    AGAP   --   7   --    --   7   --   10   BUCR   --   28*   --    --   24*   --   24*    < > = values in this interval not displayed. Microbiology No results for input(s): SDES, CULT in the last 72 hours. No results for input(s): CULT in the last 72 hours.    I/O No intake or output data in the 24 hours ending 02/13/17 2005       Scheduled Medications Reviewed:  Current Facility-Administered Medications   Medication Dose Route Frequency    potassium chloride (KLOR-CON) packet 20 mEq  20 mEq Oral Q2H    SITagliptin (JANUVIA) tablet 100 mg  100 mg Oral DAILY    insulin glargine (LANTUS) injection 60 Units  60 Units SubCUTAneous DAILY    insulin lispro (HUMALOG) injection 16 Units  16 Units SubCUTAneous TIDAC    cholecalciferol (vitamin D3) (VITAMIN D3) wafer 50,000 Units  50,000 Units Oral DAILY    predniSONE (DELTASONE) tablet 40 mg  40 mg Oral DAILY WITH BREAKFAST    insulin lispro (HUMALOG) injection   SubCUTAneous AC&HS    guaiFENesin SR (MUCINEX) tablet 600 mg  600 mg Oral BID    arformoterol (BROVANA) neb solution 15 mcg  15 mcg Nebulization BID RT    budesonide (PULMICORT) 500 mcg/2 ml nebulizer suspension  500 mcg Nebulization BID RT    montelukast (SINGULAIR) tablet 10 mg  10 mg Oral QHS    enoxaparin (LOVENOX) injection 40 mg  40 mg SubCUTAneous Q24H    levoFLOXacin (LEVAQUIN) 750 mg in D5W IVPB  750 mg IntraVENous Q24H    aspirin delayed-release tablet 81 mg  81 mg Oral DAILY    pantoprazole (PROTONIX) tablet 40 mg  40 mg Oral ACB    lisinopril (PRINIVIL, ZESTRIL) tablet 40 mg  40 mg Oral DAILY     Imaging, microbiology, and EKG/Telemetry:  Nothing new    Assessment/Plan     Nishi Lombardi is a 61 y.o. female with a h/o COPD, T2DM, HTN, GERD who was admitted for COPD exacerbation.     COPD Exacerbation   --Improving  --Switching to PO Prednisone 40 mg Daily this morning from IV solu-medrol  --Appreciate Pulmonary recommendations  --Wean oxygen to keep O2 Sats >92%  --IV Levaquin Day 5 (last day)  --Continue brovana BID, pulmicort BID. Duo-neb PRN     T2DM  --Poorly controlled with A1C 8.0 on admission  --BG ranging  over last 24 hours  --Appreciate Endocrine recommendations  --Lantus increased to 60 Units daily and prandial insulin increased to 16 Units yesterday  -- Januvia 100mg daily  --Plan to resume home metformin on discharge     HTN  --BP well controlled over last 24 hours ranging 119-150/73-86  --Continue Lisinopril 40mg daily     Dispo;  Anticipate discharge home today or tomorrow  [address], DO PGY-1  2/13/2017, 6:52 AM

## 2017-02-13 NOTE — PROGRESS NOTES
[facility]   Pulmonary and Sleep Medicine     Pulmonary Progress Note    Name: Melvin Winn   : 1960   MRN: [id number]   Date: 2017    [x]I have reviewed the flowsheet and previous days notes. Events, vitals, medications and notes from last 24 hours reviewed. Care plan discussed with nursing and  patient. IMPRESSION:   · COPD exacerbation with improved wheezing, mild hypoxia and improved work of breathing - no clinical or radiological evidence of pneumonia and etiology to explain patient's symptoms, negative D Dimer- doubt VTE. Patient with moderate to severe COPD at baseline - improved overall clinically but still with bronchitis and wheezing  · Elevated lactate - resolved -most likely related to increased work of breathing and respiratory muscle fatigue  · Morbid obesity, SHERRIE - on CPAP at home  · Hx of hypertension, chronic diastolic CHF  · Hx of GERD  · Hx of diverticulosis  · Former nicotine dependence    PLAN:   Please use BiPAP routinely  at night, and on PRN basis  during day- adjust settings per goal SPO2> 88%. May eventually transition to home CPAP   Supplemental O2 via NC when off of BiPAP, titrate flow for goal SPO2> 88%.  Assess for home needs prior to discharge  Continue bronchodilators- Brovana and Pulmicort for patient's ease of use- may switch to Advair HFA at DC home  Steroids - Continue tapering dose prednisone   Antibiotic choice: Levaquin - for five days (last dose 17)   Sputum culture - follow final report  Aspiration prevention bundle, head of the bed at 30' all times, pulmonary hygiene care  Glycemic control as needed while on steroids  Stress ulcer prophylaxis  DVT prophylaxis  OutPatient follow up  Will defer respective systems problem management to primary and other consultant and follow patient with primary and other team. [id number] Aneta James to transfer to tele  Further recommendations will be based on the patient's response to recommended treatment and results of the investigation ordered. Subjective:  Patient with COPD presented with COPD exacerbation. Reports interval improvement but still with wheezing and needing BiPAP at night. Coughing green mucus-moderate amounts. Denies chest pain. Review of Systems   Constitutional: Negative. HENT: Negative. Eyes: Negative. Cardiovascular: Negative. Gastrointestinal: Negative. Genitourinary: Negative. Musculoskeletal: Negative. Skin: Negative. Neurological: Negative. Endo/Heme/Allergies: Negative. Psychiatric/Behavioral: Negative. Vital Signs:    Blood pressure 146/83, pulse 71, temperature 97.4 °F (36.3 °C), resp. rate 18, height 5' 3\" (1.6 m), weight 111.6 kg (246 lb 1.6 oz), SpO2 98 %. Body mass index is 43.59 kg/(m^2). O2 Device: Nasal cannula   O2 Flow Rate (L/min): 2 l/min   Temp (24hrs), Av.7 °F (36.5 °C), Min:97.3 °F (36.3 °C), Max:98.5 °F (36.9 °C)       Intake/Output:   Last shift:         Last 3 shifts:  1901 -  0700  In: 720 [P.O.:720]  Out: 1800 [Urine:1800]    Intake/Output Summary (Last 24 hours) at 17 0836  Last data filed at 17 3389   Gross per 24 hour   Intake              480 ml   Output              700 ml   Net             -220 ml       Physical Exam   Constitutional: She is oriented to person, place, and time. She appears well-developed. HENT:   Head: Normocephalic and atraumatic. Eyes: EOM are normal. Pupils are equal, round, and reactive to light. Neck: Normal range of motion. Neck supple. Cardiovascular: Normal rate and regular rhythm. Pulmonary/Chest: Effort normal. She has no wheezes. Moderate air entry on both side    Abdominal: Soft. Musculoskeletal: Normal range of motion. She exhibits no edema. Neurological: She is alert and oriented to person, place, and time. Skin: Skin is warm and dry. Psychiatric: She has a normal mood and affect.  Her behavior is normal.       DATA:   Current Facility-Administered Medications   Medication Dose Route Frequency    potassium chloride (K-DUR, KLOR-CON) SR tablet 40 mEq  40 mEq Oral NOW    SITagliptin (JANUVIA) tablet 100 mg  100 mg Oral DAILY    insulin glargine (LANTUS) injection 60 Units  60 Units SubCUTAneous DAILY    insulin lispro (HUMALOG) injection 16 Units  16 Units SubCUTAneous TIDAC    cholecalciferol (vitamin D3) (VITAMIN D3) wafer 50,000 Units  50,000 Units Oral DAILY    predniSONE (DELTASONE) tablet 40 mg  40 mg Oral DAILY WITH BREAKFAST    insulin lispro (HUMALOG) injection   SubCUTAneous AC&HS    guaiFENesin SR (MUCINEX) tablet 600 mg  600 mg Oral BID    albuterol-ipratropium (DUO-NEB) 2.5 MG-0.5 MG/3 ML  3 mL Nebulization Q4H PRN    arformoterol (BROVANA) neb solution 15 mcg  15 mcg Nebulization BID RT    budesonide (PULMICORT) 500 mcg/2 ml nebulizer suspension  500 mcg Nebulization BID RT    montelukast (SINGULAIR) tablet 10 mg  10 mg Oral QHS    enoxaparin (LOVENOX) injection 40 mg  40 mg SubCUTAneous Q24H    acetaminophen (TYLENOL) tablet 650 mg  650 mg Oral Q4H PRN    levoFLOXacin (LEVAQUIN) 750 mg in D5W IVPB  750 mg IntraVENous Q24H    glucose chewable tablet 16 g  4 Tab Oral PRN    glucagon (GLUCAGEN) injection 1 mg  1 mg IntraMUSCular PRN    dextrose (D50W) injection syrg 12.5-25 g  25-50 mL IntraVENous PRN    aspirin delayed-release tablet 81 mg  81 mg Oral DAILY    pantoprazole (PROTONIX) tablet 40 mg  40 mg Oral ACB    lisinopril (PRINIVIL, ZESTRIL) tablet 40 mg  40 mg Oral DAILY                    Labs:  Recent Results (from the past 24 hour(s))   GLUCOSE, POC    Collection Time: 02/12/17 11:24 AM   Result Value Ref Range    Glucose (POC) 244 (H) 70 - 110 mg/dL   GLUCOSE, POC    Collection Time: 02/12/17  4:23 PM   Result Value Ref Range    Glucose (POC) 160 (H) 70 - 110 mg/dL   GLUCOSE, POC    Collection Time: 02/12/17  9:09 PM   Result Value Ref Range    Glucose (POC) 161 (H) 70 - 110 mg/dL   CBC WITH AUTOMATED DIFF    Collection Time: 02/13/17  3:31 AM   Result Value Ref Range    WBC 7.2 4.6 - 13.2 K/uL    RBC 4.37 4.20 - 5.30 M/uL    HGB 13.4 12.0 - 16.0 g/dL    HCT 39.5 35.0 - 45.0 %    MCV 90.4 74.0 - 97.0 FL    MCH 30.7 24.0 - 34.0 PG    MCHC 33.9 31.0 - 37.0 g/dL    RDW 13.3 11.6 - 14.5 %    PLATELET 395 460 - 324 K/uL    MPV 9.2 9.2 - 11.8 FL    NEUTROPHILS 54 40 - 73 %    LYMPHOCYTES 36 21 - 52 %    MONOCYTES 10 3 - 10 %    EOSINOPHILS 0 0 - 5 %    BASOPHILS 0 0 - 2 %    ABS. NEUTROPHILS 3.9 1.8 - 8.0 K/UL    ABS. LYMPHOCYTES 2.6 0.9 - 3.6 K/UL    ABS. MONOCYTES 0.7 0.05 - 1.2 K/UL    ABS. EOSINOPHILS 0.0 0.0 - 0.4 K/UL    ABS. BASOPHILS 0.0 0.0 - 0.1 K/UL    DF AUTOMATED     METABOLIC PANEL, BASIC    Collection Time: 02/13/17  3:31 AM   Result Value Ref Range    Sodium 139 136 - 145 mmol/L    Potassium 3.2 (L) 3.5 - 5.5 mmol/L    Chloride 100 100 - 108 mmol/L    CO2 32 21 - 32 mmol/L    Anion gap 7 3.0 - 18 mmol/L    Glucose 91 74 - 99 mg/dL    BUN 24 (H) 7.0 - 18 MG/DL    Creatinine 0.86 0.6 - 1.3 MG/DL    BUN/Creatinine ratio 28 (H) 12 - 20      GFR est AA >60 >60 ml/min/1.73m2    GFR est non-AA >60 >60 ml/min/1.73m2    Calcium 9.4 8.5 - 10.1 MG/DL   MAGNESIUM    Collection Time: 02/13/17  4:18 AM   Result Value Ref Range    Magnesium 2.2 1.8 - 2.4 mg/dL   GLUCOSE, POC    Collection Time: 02/13/17  8:09 AM   Result Value Ref Range    Glucose (POC) 73 70 - 110 mg/dL           No results for input(s): FIO2I, IFO2, HCO3I, IHCO3, HCOPOC, PCO2I, PCOPOC, IPHI, PHI, PHPOC, PO2I, PO2POC in the last 72 hours.     No lab exists for component: IPOC2  All Micro Results     Procedure Component Value Units Date/Time    CULTURE, BLOOD [[id number]] Collected:  02/08/17 1645    Order Status:  Completed Specimen:  Whole Blood from Blood Updated:  02/13/17 0741     Special Requests: NO SPECIAL REQUESTS        Culture result: NO GROWTH 5 DAYS       CULTURE, BLOOD [[id number]] Collected:  02/08/17 1614    Order Status: Completed Specimen:  Whole Blood from Blood Updated:  02/13/17 0741     Special Requests: NO SPECIAL REQUESTS        Culture result: NO GROWTH 5 DAYS       CULTURE, RESPIRATORY/SPUTUM/BRONCH Uma Gaitan [[id number]] Collected:  02/09/17 1711    Order Status:  Completed Specimen:  Sputum from Sputum Updated:  02/11/17 1302     Special Requests: NO SPECIAL REQUESTS        GRAM STAIN >25 WBC/lpf         <10 EPI/lpf         MUCUS PRESENT         FEW  GRAM POSITIVE COCCI  IN PAIRS         RARE  GRAM POSITIVE RODS         RARE  GRAM NEGATIVE RODS        Culture result: MODERATE  NORMAL RESPIRATORY TOAN                 Imaging:  [x]I have personally reviewed the patients chest radiographs images and report with the patient      Results from Hospital Encounter encounter on 02/08/17   XR CHEST PORT   Narrative Procedure:  Chest portable. Indication:  Shortness of breath. Comparison:  9/18/16, 6/3/15. Findings: An obliquely oriented bandlike density is identified in the right  lung base. A similar density was noted on 9/18/2016 and 6/3/2015. A focus of  hazy density with circumscribed margins is identified in the left lower lung  zone adjacent to the cardiac apex. This density is found to be related to a  pericardial fat pad on 6/3/15. No new infiltrate or consolidation is  identified. The cardiac silhouette is mildly prominent. Impression Impression:    1. Stable bilateral lower lung zone densities. 2.  No new infiltrate or consolidation.           Results from Hospital Encounter encounter on 09/05/13   CTA CHEST W WO CONT   Narrative CTA Chest With Contrast Pulmonary Arterial Exam With Maximum Intensity  Projections (MIPs)    CPT CODE: 97334    INDICATION:    , H/P ASTHMA AND COPD-NOW WITH UNRESLVING SOB-CHEST  PRESSURE/HEAVINESS-EVALUATE FOR MASS AND PE.  coronal and sagittal MIPs  were obtained to better evaluate the pulmonary  arteries in a three dimensional angiographic method to evaluate for possible  pulmonary emboli    COMPARISON: CT chest March 19, 2010. TECHNIQUE: Initial localizing images were obtained without intravenous  contrast. Standard helical images were obtained from the thoracic inlet through  the adrenals at 2.5 mm thick sections following the intravenous injection of a  bolus of 100 cc nonionic contrast.  Patient was premedicated with the Radin  steroid prep. Images were reviewed on both soft tissue, lung, and bone window  settings. FINDINGS:    The quality of opacification of the pulmonary arteries is insufficient to  exclude peripheral PE. There are no filling defects within the right and left  main pulmonary arteries. There is no evidence of mediastinal, hilar, nor axillary adenopathy. The great vessels and thoracic aorta are unremarkable. There are no pleural effusions. The lungs are well inflated. Minimal linear scarring within the right middle  lobe. .    The included portion of the of the liver is unremarkable. The adrenal glands  are normal.    The chest wall soft tissues are unremarkable. Reconstructions: Coronal and sagittal MIPs ( maximum intensity projections)  were obtained from the axial acquisition. The pulmonary arteries branch  normally. There is insufficient opacification to exclude filling defects. Impression IMPRESSION:    There is insufficient contrast and increased mottle within the pulmonary  arteries to exclude PE. Recommend followup VQ scan. Wet reading, orange alert provided to Dr. Mandi Warner   at 1341 hrs by  [facility].          [x]See my orders for details    My assessment, plan of care, findings, medications, side effects etc were discussed with:  [x]nursing []PT/OT    []respiratory therapy    []family [x]Patient       Sarabjit Lange MD

## 2017-02-13 NOTE — ROUTINE PROCESS
1913 Received patient after report from off going nurse. Patient resting in bed. No distress noted. Call bell within reach, siderails up x 3, bed in lowest position, and patient instructed to use call bell for assistance. Will continue to monitor. 9259 Bedside and Verbal shift change report given to [facility] RN (oncoming nurse) by Scotty Palmer RN (offgoing nurse). Report included the following information Kardex, Intake/Output, MAR and Recent Results.

## 2017-02-13 NOTE — INTERDISCIPLINARY ROUNDS
IDR/SLIDR Summary          Patient: Alka Mehta MRN: [id number]    Age: 61 y.o. YOB: 1960 Room/Bed: [facility]   Admit Diagnosis: COPD exacerbation (HCC)  Acute exacerbation of COPD with asthma ([facility] [address] [facility]  Acute exacerbation of COPD with asthma ([facility] [address]  Principal Diagnosis: COPD exacerbation ([facility]   Goals: breath better    Readmission: {YES/NO:52380}  Quality Measure: {Quality Measures:82851}  VTE Prophylaxis: {VTE Prophylaxis:21798}  Influenza Vaccine screening completed? {YES/NO:52625}  Pneumococcal Vaccine screening completed? {YES/NO:24195}  Mobility needs: {Yes/No Caps:35471}   Nutrition plan:{Yes/No Caps:25029}  Consults:{Consult needed:21347}    Financial concerns:{Yes/No Caps:35902}  Escalated to CM? {YES/NO:72264}  RRAT Score: ***   Interventions:{Intervention:21348}  Testing due for pt today?  {YES/NO:52140}  LOS: 5 days Expected length of stay *** days  Discharge plan: ***   PCP: Richard Bernal MD  Transportation needs: {Yes/No Caps:22126}    Days before discharge:{Days before Discharge:21344}  Discharge disposition: {Discharge Destination:41975::\"Home\"}    Signed:     Raghu Gaytan RN  2/13/2017  10:55 AM

## 2017-02-13 NOTE — PROGRESS NOTES
ENDOCRINOLOGY:    Glycemic control improving, previously recommended to reduce Lantus to 40 units, will change to 20 units in the morning.

## 2017-02-14 ENCOUNTER — HOME HEALTH ADMISSION (OUTPATIENT)
Dept: HOME HEALTH SERVICES | Facility: HOME HEALTH | Age: 57
End: 2017-02-14
Payer: MEDICARE

## 2017-02-14 VITALS
DIASTOLIC BLOOD PRESSURE: 68 MMHG | BODY MASS INDEX: 43.59 KG/M2 | TEMPERATURE: 97.8 F | HEIGHT: 63 IN | SYSTOLIC BLOOD PRESSURE: 109 MMHG | HEART RATE: 68 BPM | OXYGEN SATURATION: 97 % | WEIGHT: 246 LBS | RESPIRATION RATE: 20 BRPM

## 2017-02-14 LAB
ANION GAP BLD CALC-SCNC: 9 MMOL/L (ref 3–18)
BACTERIA SPEC CULT: NORMAL
BACTERIA SPEC CULT: NORMAL
BASOPHILS # BLD AUTO: 0 K/UL (ref 0–0.06)
BASOPHILS # BLD: 0 % (ref 0–3)
BUN SERPL-MCNC: 18 MG/DL (ref 7–18)
BUN/CREAT SERPL: 21 (ref 12–20)
CALCIUM SERPL-MCNC: 8.9 MG/DL (ref 8.5–10.1)
CHLORIDE SERPL-SCNC: 101 MMOL/L (ref 100–108)
CO2 SERPL-SCNC: 29 MMOL/L (ref 21–32)
CREAT SERPL-MCNC: 0.86 MG/DL (ref 0.6–1.3)
DIFFERENTIAL METHOD BLD: ABNORMAL
EOSINOPHIL # BLD: 0 K/UL (ref 0–0.4)
EOSINOPHIL NFR BLD: 0 % (ref 0–5)
ERYTHROCYTE [DISTWIDTH] IN BLOOD BY AUTOMATED COUNT: 13.3 % (ref 11.6–14.5)
GLUCOSE BLD STRIP.AUTO-MCNC: 100 MG/DL (ref 70–110)
GLUCOSE BLD STRIP.AUTO-MCNC: 128 MG/DL (ref 70–110)
GLUCOSE BLD STRIP.AUTO-MCNC: 273 MG/DL (ref 70–110)
GLUCOSE SERPL-MCNC: 125 MG/DL (ref 74–99)
HCT VFR BLD AUTO: 38.8 % (ref 35–45)
HGB BLD-MCNC: 13 G/DL (ref 12–16)
LYMPHOCYTES # BLD AUTO: 41 % (ref 20–51)
LYMPHOCYTES # BLD: 3.1 K/UL (ref 0.8–3.5)
MCH RBC QN AUTO: 30.4 PG (ref 24–34)
MCHC RBC AUTO-ENTMCNC: 33.5 G/DL (ref 31–37)
MCV RBC AUTO: 90.7 FL (ref 74–97)
MONOCYTES # BLD: 0.8 K/UL (ref 0–1)
MONOCYTES NFR BLD AUTO: 10 % (ref 2–9)
NEUTS SEG # BLD: 3.7 K/UL (ref 1.8–8)
NEUTS SEG NFR BLD AUTO: 49 % (ref 42–75)
PLATELET # BLD AUTO: 272 K/UL (ref 135–420)
PLATELET COMMENTS,PCOM: ABNORMAL
PMV BLD AUTO: 9.2 FL (ref 9.2–11.8)
POTASSIUM SERPL-SCNC: 3.5 MMOL/L (ref 3.5–5.5)
RBC # BLD AUTO: 4.28 M/UL (ref 4.2–5.3)
RBC MORPH BLD: ABNORMAL
SERVICE CMNT-IMP: NORMAL
SERVICE CMNT-IMP: NORMAL
SODIUM SERPL-SCNC: 139 MMOL/L (ref 136–145)
WBC # BLD AUTO: 7.6 K/UL (ref 4.6–13.2)

## 2017-02-14 PROCEDURE — 74011636637 HC RX REV CODE- 636/637: Performed by: INTERNAL MEDICINE

## 2017-02-14 PROCEDURE — 85025 COMPLETE CBC W/AUTO DIFF WBC: CPT | Performed by: FAMILY MEDICINE

## 2017-02-14 PROCEDURE — 74011636637 HC RX REV CODE- 636/637: Performed by: FAMILY MEDICINE

## 2017-02-14 PROCEDURE — 74011250636 HC RX REV CODE- 250/636: Performed by: FAMILY MEDICINE

## 2017-02-14 PROCEDURE — 77010033678 HC OXYGEN DAILY

## 2017-02-14 PROCEDURE — 74011250637 HC RX REV CODE- 250/637: Performed by: FAMILY MEDICINE

## 2017-02-14 PROCEDURE — 74011000250 HC RX REV CODE- 250: Performed by: INTERNAL MEDICINE

## 2017-02-14 PROCEDURE — 74011250637 HC RX REV CODE- 250/637: Performed by: INTERNAL MEDICINE

## 2017-02-14 PROCEDURE — 36415 COLL VENOUS BLD VENIPUNCTURE: CPT | Performed by: FAMILY MEDICINE

## 2017-02-14 PROCEDURE — 94640 AIRWAY INHALATION TREATMENT: CPT

## 2017-02-14 PROCEDURE — 82962 GLUCOSE BLOOD TEST: CPT

## 2017-02-14 PROCEDURE — 94760 N-INVAS EAR/PLS OXIMETRY 1: CPT

## 2017-02-14 PROCEDURE — 80048 BASIC METABOLIC PNL TOTAL CA: CPT | Performed by: FAMILY MEDICINE

## 2017-02-14 PROCEDURE — 94660 CPAP INITIATION&MGMT: CPT

## 2017-02-14 RX ORDER — INSULIN GLARGINE 100 [IU]/ML
10 INJECTION, SOLUTION SUBCUTANEOUS DAILY
Status: DISCONTINUED | OUTPATIENT
Start: 2017-02-15 | End: 2017-02-14 | Stop reason: HOSPADM

## 2017-02-14 RX ORDER — MAGNESIUM SULFATE 100 %
4 CRYSTALS MISCELLANEOUS AS NEEDED
Qty: 30 TAB | Refills: 0 | Status: SHIPPED | OUTPATIENT
Start: 2017-02-14 | End: 2017-03-16

## 2017-02-14 RX ORDER — INSULIN GLARGINE 100 [IU]/ML
10 INJECTION, SOLUTION SUBCUTANEOUS
Qty: 1 VIAL | Refills: 2 | Status: SHIPPED | OUTPATIENT
Start: 2017-02-14 | End: 2018-01-26

## 2017-02-14 RX ORDER — INSULIN LISPRO 100 [IU]/ML
INJECTION, SOLUTION INTRAVENOUS; SUBCUTANEOUS
Qty: 1 VIAL | Refills: 2 | Status: SHIPPED | OUTPATIENT
Start: 2017-02-14 | End: 2018-06-08 | Stop reason: DRUGHIGH

## 2017-02-14 RX ORDER — PREDNISONE 10 MG/1
TABLET ORAL
Qty: 17 TAB | Refills: 0 | Status: SHIPPED | OUTPATIENT
Start: 2017-02-14 | End: 2018-01-12

## 2017-02-14 RX ADMIN — PREDNISONE 40 MG: 20 TABLET ORAL at 10:17

## 2017-02-14 RX ADMIN — ASPIRIN 81 MG: 81 TABLET, COATED ORAL at 10:18

## 2017-02-14 RX ADMIN — Medication 50000 UNITS: at 10:19

## 2017-02-14 RX ADMIN — GUAIFENESIN 600 MG: 600 TABLET, EXTENDED RELEASE ORAL at 10:17

## 2017-02-14 RX ADMIN — BUDESONIDE 500 MCG: 0.5 INHALANT RESPIRATORY (INHALATION) at 08:27

## 2017-02-14 RX ADMIN — ENOXAPARIN SODIUM 40 MG: 100 INJECTION SUBCUTANEOUS at 17:28

## 2017-02-14 RX ADMIN — INSULIN LISPRO 9 UNITS: 100 INJECTION, SOLUTION INTRAVENOUS; SUBCUTANEOUS at 17:27

## 2017-02-14 RX ADMIN — LISINOPRIL 40 MG: 20 TABLET ORAL at 10:15

## 2017-02-14 RX ADMIN — INSULIN GLARGINE 20 UNITS: 100 INJECTION, SOLUTION SUBCUTANEOUS at 09:00

## 2017-02-14 RX ADMIN — ARFORMOTEROL TARTRATE 15 MCG: 15 SOLUTION RESPIRATORY (INHALATION) at 08:27

## 2017-02-14 RX ADMIN — PANTOPRAZOLE SODIUM 40 MG: 40 TABLET, DELAYED RELEASE ORAL at 10:15

## 2017-02-14 NOTE — HOME CARE
Received HH referral; Discharge noted for today, DME :BSC ordered from [facility] ,notified Roddy Ambriz at [facility] that orders were faxed to [facility] for [facility], Roddy Ambriz states that [facility] does accept pt's insurance ; case brian Hodgson) states that pt does qualify for home O2 and that she has ordered Home O2 from [facility] ; [facility] will follow for SN,COPD,PT/OT; called Dr Lyndsay Mercado due to pt having a home care order and [facility], Dr John Kelsey states she wants pt to have home health care, pt states her sister will pick her up for d/c today. THOMAS NICHOLS.

## 2017-02-14 NOTE — ROUTINE PROCESS
0 0710 Pt received after bedside shift report from Jessi Valdivia RN. Pt resting in bed with no distress noted. . Bed locked and in low position. Call bell in reach. Oxygen qualification test done as ordered. Oxygen level at. 93% on room air and at rest and 88% while ambulating on room air. . 97% oxygen level while ambulating on 2lpm      1820 Portable oxygen delivered at bedside, verbal instructions provided by technician. Discharged instructions given to patient as ordered. Verbalized understanding. Opportunity for questions and clarification provided. All questions answered. All belongings with patient.

## 2017-02-14 NOTE — DISCHARGE SUMMARY
Discharge Summary  [address] [facility]      Patient: Tino Glass Age: 61 y.o. Sex: female  : 1960    MRN: [id number]      DOA: 2017      Discharge Date: 2017      Attending:No att. providers found      PCP: Kimberly Hinojosa MD        ================================================================    Reason for Admission: COPD exacerbation ([address]  Acute exacerbation of COPD with asthma ([facility]  Acute exacerbation of COPD with asthma ([facility]    Discharge Diagnoses:   COPD exacerbation  T2DM  HTN  GERD    Important notes to PCP/ follow-up studies and evaluations   Patient had to be started on insulin for uncontrolled sugars while in hospital and for worsening A1c. Pt likely required insulin 2/2 to the increased amount of steroids prescribed to her. She was instructed to use the lantus while on the Prednisone 40 mg and then to transition to Humalog for pre-prandial coverage. She will likely not need insulin in the long run. Pt was also taken off Metformin for lactic acidosis and started on Januvia. Pending labs and studies:  None  Operative Procedures:   None    Discharge Medications:     Current Discharge Medication List      START taking these medications    Details   glucose 4 gram chewable tablet Take 4 Tabs by mouth as needed for up to 30 days. Qty: 30 Tab, Refills: 0      insulin glargine (LANTUS) 100 unit/mL injection 10 Units by SubCUTAneous route nightly. Indications: type 2 diabetes mellitus  Qty: 1 Vial, Refills: 2      SITagliptin (JANUVIA) 100 mg tablet Take 1 Tab by mouth daily for 30 days. Indications: type 2 diabetes mellitus  Qty: 30 Tab, Refills: 0      !! Diabetic Supplies, [address] [facility] Diabetic needles 1/2 inch 31 gauge - 1 injection daily  Qty: 30 Each, Refills: 5      !! Diabetic Supplies, [address] [facility] Diabetic syringes 1 mL - use one daily  Qty: 30 Each, Refills: 5      !! Diabetic Supplies, [address]  [facility] Diabetic test strips - use three times daily  Qty: 90 Each, Refills: 5      !! Diabetic Supplies, [address] [facility] Diabetic lancets - use three times daily  Qty: 90 Each, Refills: 5      insulin lispro (HUMALOG) 100 unit/mL injection Check sugars three times a day before meals, if blood sugar is greater than 250 give yourself 8 units of Humalog before eating. Indications: type 2 diabetes mellitus  Qty: 1 Vial, Refills: 2       !! - Potential duplicate medications found. Please discuss with provider. CONTINUE these medications which have CHANGED    Details   predniSONE (DELTASONE) 10 mg tablet Take 40 mg for 2 days, then take 20 mg for 3 days, and then take 10 mg 3 days  Qty: 17 Tab, Refills: 0         CONTINUE these medications which have NOT CHANGED    Details   fluticasone-salmeterol (ADVAIR HFA) 230-21 mcg/actuation inhaler Take 2 Puffs by inhalation two (2) times a day. aspirin delayed-release 81 mg tablet Take 81 mg by mouth daily. famotidine (PEPCID) 20 mg tablet Take 20 mg by mouth nightly. lansoprazole (PREVACID) 30 mg capsule Take 30 mg by mouth Daily (before breakfast). lisinopril (PRINIVIL, ZESTRIL) 10 mg tablet Take 40 mg by mouth daily. albuterol (PROVENTIL HFA, VENTOLIN HFA, PROAIR HFA) 90 mcg/actuation inhaler Take 2 Puffs by inhalation every four (4) hours as needed for Wheezing. For the next 2 days after hospital discharge, use your inhaler 4 times a day. Qty: 1 Inhaler, Refills: 0      tiotropium (SPIRIVA WITH HANDIHALER) 18 mcg inhalation capsule Take 1 Cap by inhalation daily. albuterol (PROVENTIL VENTOLIN) 2.5 mg /3 mL (0.083 %) nebulizer solution 2.5 mg by Nebulization route every four (4) hours as needed for Wheezing. Inhalational Spacing Device (AEROCHAMBER) 1 Each by Does Not Apply route as needed for Cough or Other (COPD exacerbation). Qty: 1 Device, Refills: 0      fluticasone (FLONASE) 50 mcg/actuation nasal spray 2 Sprays by Both Nostrils route daily.         montelukast (SINGULAIR) 10 mg tablet Take 10 mg by mouth daily. guaiFENesin ER (MUCINEX) 600 mg ER tablet Take 600 mg by mouth two (2) times daily as needed for Congestion. triamcinolone acetonide (KENALOG) 0.1 % ointment Apply  to affected area two (2) times daily as needed for Skin Irritation. use thin layer         STOP taking these medications       metFORMIN (GLUCOPHAGE) 500 mg tablet Comments:   Reason for Stopping:               Disposition: Home with Home Health    Consultants:    Pulmonology - Juan [facility] Course (including pertinent history and physical findings)  Lenora Jacobs is a 61 y.o. female with history of COPD/Asthma, DMII, HTN, and GERD who presented with shortness of breath. Patient reported that her symptoms started acutely the night before arriving to hospital. She tried several albuterol nebulizer treatments, but these did not alleviate her symptoms. Patient reported she also developed cough productive of greenish sputum. She denied any fevers or chills. She denied any sick contacts. Due to worsening symptoms, she called EMS and was brought to [facility] for medical attention. Patient had an elevated lactic acid that continued to increase to as high as 5.1 on POC test and as high as 3.9 on serum lactic acid. Patient was never septic, taking metformin prior to arrival and had increased work of breathing and the increased lactic acidosis was either attributed to the metformin or muscle fatigue from increased work of breathing. Pulmonology was consulted and she placed on a BiPAP at night and as needed during the day. Prior to this hospitalization she did not require home O2 but required it the entire hospital stay. We held her home inhalers and gave her Brovana and Pulmicort nebulizer treatments, gave her solu-medrol 40mg q6h. Patient was initially started on azithromycin and levaquin.  She finished the course of antibiotics without any complications. Her blood sugars were very poorly controlled, likely due to the increased amount of IV steroids, endocrine was consulted and they started the patient on lantus and preprandial humalog. As the amount of steroids decreased the patient required less lantus and was discharged on 10 units daily and asked to stop the lantus after the 40 mg of prednisone taper stops. Pt also failed her walking ambulatory test and desaturated down to 88% on ambulation, pt was sent home on O2 because of this.      Summarized key findings and results (labs, imaging studies, ECHO, cardiac cath, endoscopies, etc):    CBC w/Diff    Lab Results   Component Value Date/Time    WBC 7.6 02/14/2017 03:41 AM    HGB 13.0 02/14/2017 03:41 AM    HCT 38.8 02/14/2017 03:41 AM    PLATELET 086 67/42/8898 03:41 AM    MCV 90.7 02/14/2017 03:41 AM           Chemistry    Lab Results   Component Value Date/Time    Sodium 139 02/14/2017 03:41 AM    Potassium 3.5 02/14/2017 03:41 AM    Chloride 101 02/14/2017 03:41 AM    CO2 29 02/14/2017 03:41 AM    Anion gap 9 02/14/2017 03:41 AM    Glucose 125 02/14/2017 03:41 AM    BUN 18 02/14/2017 03:41 AM    Creatinine 0.86 02/14/2017 03:41 AM    BUN/Creatinine ratio 21 02/14/2017 03:41 AM    GFR est AA >60 02/14/2017 03:41 AM    GFR est non-AA >60 02/14/2017 03:41 AM    Calcium 8.9 02/14/2017 03:41 AM         All Micro Results     Procedure Component Value Units Date/Time    CULTURE, BLOOD [[id number]] Collected:  02/08/17 1614    Order Status:  Completed Specimen:  Whole Blood from Blood Updated:  02/14/17 0814     Special Requests: NO SPECIAL REQUESTS        Culture result: NO GROWTH 6 DAYS       CULTURE, BLOOD [[id number]] Collected:  02/08/17 1645    Order Status:  Completed Specimen:  Whole Blood from Blood Updated:  02/14/17 0814     Special Requests: NO SPECIAL REQUESTS        Culture result: NO GROWTH 6 DAYS       CULTURE, RESPIRATORY/SPUTUM/BRONCH Milfay Ulises [[id number]] Collected:  02/09/17 1711 Order Status:  Completed Specimen:  Sputum from Sputum Updated:  02/11/17 1302     Special Requests: NO SPECIAL REQUESTS        GRAM STAIN >25 WBC/lpf         <10 EPI/lpf         MUCUS PRESENT         FEW  GRAM POSITIVE COCCI  IN PAIRS         RARE  GRAM POSITIVE RODS         RARE  GRAM NEGATIVE RODS        Culture result: MODERATE  NORMAL RESPIRATORY TOAN             Functional status and cognitive function:    Ambulates without assistance  Status: alert, cooperative, no distress, appears stated age    Diet: Diabetic    Code status and advanced care plan: Full  Power Of [address] [place] [address] of [address] [place] [address], Daughter, [phone]    Patient Education:  Patient was educated on the following topics prior to discharge:Diabetic insulin administration    Follow-up:   Follow-up Information     Follow up With Details Comments Contact Info    Phyllis Worley MD Go on 3/10/2017 Appointment time 245 pm, patient will see Dr. Mesha Lopez [id number]  [phone]      [facility] [address] follow up [facility]    Kimberly Hinojosa MD Go on 2/17/2017 appointment time 10:00am with Dr. Cabrera [address]  [phone]          ================================================================  Shannon Tidwell DO, PGY-1  [facility] Medicine  2/17/2017, 5:58 PM

## 2017-02-14 NOTE — PROGRESS NOTES
Intern Progress Note  [facility]       Patient: Lenora Jacobs MRN: [id number]  CSN: [id number]    YOB: 1960  Age: 61 y.o. Sex: female    DOA: 2/8/2017 LOS:  LOS: 6 days   PCP: Bing Brooks MD                 Subjective:     Acute events: No acute events overnight. Pt used BiPAP overnight and tolerated it well. Had an ambulatory oximetry test done yesterday, patient desaturated down to 88% on RA, will set up home O2 prior to d/c. Pt states she feels strong enough to go home. Brief ROS: Endorses cough and SOB.  Denies cp, fever, chills, n/v/d    Objective:      Patient Vitals for the past 24 hrs:   Temp Pulse Resp BP SpO2   02/14/17 0527 97.2 °F (36.2 °C) 60 22 163/74 100 %   02/14/17 0438 - - - - 92 %   02/14/17 0216 98 °F (36.7 °C) 79 22 107/66 97 %   02/14/17 0103 - - - - 98 %   02/13/17 1944 97.1 °F (36.2 °C) 89 22 112/73 98 %   02/13/17 1942 - - - - 98 %   02/13/17 1556 97.9 °F (36.6 °C) (!) 108 23 139/89 (!) 88 %   02/13/17 1554 - - - - 91 %   02/13/17 1127 97.6 °F (36.4 °C) 84 18 136/84 92 %   02/13/17 0828 - - - - 98 %   02/13/17 0804 97.4 °F (36.3 °C) 71 18 146/83 96 %       Physical Exam:   General: alert, well developed and in no apparent distress  Head: NC/AT  Cardiovascular: RRR s MRGs  Respiratory: CTAB w diffuse wheezing  Abdomen: Soft, +BS, non-TTP, Non-Distended  Extremities: No edema in lower extremities bilaterally, 2+ radial pulses intact bilaterally    Lab/Data Reviewed:    CBC w/Diff Recent Labs      02/14/17   0341  02/13/17   0331  02/12/17   0352   WBC  7.6  7.2  8.1   RBC  4.28  4.37  4.33   HGB  13.0  13.4  13.1   HCT  38.8  39.5  39.6   PLT  272  299  332   GRANS  49  54  81*   LYMPH  41  36  11*   EOS  0  0  0      Chemistry Recent Labs      02/14/17   0341  02/13/17   1702  02/13/17   1505   02/13/17   0418  02/13/17   0331   02/12/17   0352   GLUCPOC   --   236*   --    < >   --    --    < >   --    GLU  125*   --    --    --    -- 91   --   208*   NA  139   --    --    --    --   139   --   136   K  3.5   --   4.1   --    --   3.2*   --   4.0   CL  101   --    --    --    --   100   --   99*   CO2  29   --    --    --    --   32   --   30   BUN  18   --    --    --    --   24*   --   23*   CREA  0.86   --    --    --    --   0.86   --   0.97   CA  8.9   --    --    --    --   9.4   --   9.6   MG   --    --    --    --   2.2   --    --    --    AGAP  9   --    --    --    --   7   --   7   BUCR  21*   --    --    --    --   28*   --   24*    < > = values in this interval not displayed. Microbiology No results for input(s): SDES, CULT in the last 72 hours. No results for input(s): CULT in the last 72 hours.    I/O   Intake/Output Summary (Last 24 hours) at 02/14/17 0730  Last data filed at 02/13/17 1745   Gross per 24 hour   Intake              720 ml   Output             1600 ml   Net             -880 ml          Scheduled Medications Reviewed:  Current Facility-Administered Medications   Medication Dose Route Frequency    insulin glargine (LANTUS) injection 20 Units  20 Units SubCUTAneous DAILY    SITagliptin (JANUVIA) tablet 100 mg  100 mg Oral DAILY    cholecalciferol (vitamin D3) (VITAMIN D3) wafer 50,000 Units  50,000 Units Oral DAILY    predniSONE (DELTASONE) tablet 40 mg  40 mg Oral DAILY WITH BREAKFAST    insulin lispro (HUMALOG) injection   SubCUTAneous AC&HS    guaiFENesin SR (MUCINEX) tablet 600 mg  600 mg Oral BID    arformoterol (BROVANA) neb solution 15 mcg  15 mcg Nebulization BID RT    budesonide (PULMICORT) 500 mcg/2 ml nebulizer suspension  500 mcg Nebulization BID RT    enoxaparin (LOVENOX) injection 40 mg  40 mg SubCUTAneous Q24H    aspirin delayed-release tablet 81 mg  81 mg Oral DAILY    pantoprazole (PROTONIX) tablet 40 mg  40 mg Oral ACB    lisinopril (PRINIVIL, ZESTRIL) tablet 40 mg  40 mg Oral DAILY     Imaging, microbiology, and EKG/Telemetry:  Nothing new    Assessment/Plan     Tanmay Morejon Hal Morrow is a 61 y.o. female with a h/o COPD, T2DM, HTN, GERD who was admitted for COPD exacerbation.     COPD Exacerbation   --Improving  --Switching to PO Prednisone 40 mg Daily taper  --Appreciate Pulmonary recommendations  --Wean oxygen to keep O2 Sats >92%  --IV Levaquin stopped  --Continue brovana BID, pulmicort BID. Duo-neb PRN     T2DM  --Poorly controlled with A1C 8.0 on admission  --Appreciate Endocrine recommendations  --Lantus increased to 20 Units daily and CSI  -- Januvia 100mg daily     HTN  --BP well controlled over last 24 hours  --Continue Lisinopril 40mg daily     Dispo;  Anticipate discharge home today   [address], DO PGY-1  2/14/2017, 6:52 AM

## 2017-02-14 NOTE — DIABETES MGMT
Glycemic Control: noted consult. Pt has been seen for diabetes education on 2/10/17. Followed up to reinforce education and verify pt has glucometer. Pt reports having glucometer and all her supplies at home. Pt did have questions about Januvia and requested written information. Information was provided. Continue inpatient monitoring and intervention.      Chris Salazar RD, CDE

## 2017-02-14 NOTE — ROUTINE PROCESS
Bedside and Verbal shift change report given to Logan Diane RN (oncoming nurse) by Kain Mcleod RN (offgoing nurse). Report given with Shayy VERDE and MAR.

## 2017-02-14 NOTE — DISCHARGE INSTRUCTIONS
DISCHARGE SUMMARY from Nurse    The following personal items are in your possession at time of discharge:    Dental Appliances: None  Visual Aid: None     Home Medications: Sent to pharmacy ([facility])  Jewelry: None  Clothing: Pants  Other Valuables: None             PATIENT INSTRUCTIONS:    After general anesthesia or intravenous sedation, for 24 hours or while taking prescription Narcotics:  · Limit your activities  · Do not drive and operate hazardous machinery  · Do not make important personal or business decisions  · Do  not drink alcoholic beverages  · If you have not urinated within 8 hours after discharge, please contact your surgeon on call. Report the following to your surgeon:  · Excessive pain, swelling, redness or odor of or around the surgical area  · Temperature over 100.5  · Nausea and vomiting lasting longer than 4 hours or if unable to take medications  · Any signs of decreased circulation or nerve impairment to extremity: change in color, persistent  numbness, tingling, coldness or increase pain  · Any questions        What to do at Home:  Recommended activity: Activity as tolerated, please use fall precautions at all times. If you experience any of the following symptoms chest pain difficulty breathing, or any unusual symptoms, please call and inform your primary care doctor or call 911, please follow up with all scheduled appointments as ordered. *  Please give a list of your current medications to your Primary Care Provider. *  Please update this list whenever your medications are discontinued, doses are      changed, or new medications (including over-the-counter products) are added. *  Please carry medication information at all times in case of emergency situations.           These are general instructions for a healthy lifestyle:    No smoking/ No tobacco products/ Avoid exposure to second hand smoke    Surgeon General's Warning:  Quitting smoking now greatly reduces serious risk to your health. Obesity, smoking, and sedentary lifestyle greatly increases your risk for illness    A healthy diet, regular physical exercise & weight monitoring are important for maintaining a healthy lifestyle    You may be retaining fluid if you have a history of heart failure or if you experience any of the following symptoms:  Weight gain of 3 pounds or more overnight or 5 pounds in a week, increased swelling in our hands or feet or shortness of breath while lying flat in bed. Please call your doctor as soon as you notice any of these symptoms; do not wait until your next office visit. Recognize signs and symptoms of STROKE:    F-face looks uneven    A-arms unable to move or move unevenly    S-speech slurred or non-existent    T-time-call 911 as soon as signs and symptoms begin-DO NOT go       Back to bed or wait to see if you get better-TIME IS BRAIN. Warning Signs of HEART ATTACK     Call 911 if you have these symptoms:   Chest discomfort. Most heart attacks involve discomfort in the center of the chest that lasts more than a few minutes, or that goes away and comes back. It can feel like uncomfortable pressure, squeezing, fullness, or pain.  Discomfort in other areas of the upper body. Symptoms can include pain or discomfort in one or both arms, the back, neck, jaw, or stomach.  Shortness of breath with or without chest discomfort.  Other signs may include breaking out in a cold sweat, nausea, or lightheadedness. Don't wait more than five minutes to call 911 - MINUTES MATTER! Fast action can save your life. Calling 911 is almost always the fastest way to get lifesaving treatment. Emergency Medical Services staff can begin treatment when they arrive -- up to an hour sooner than if someone gets to the hospital by car. The discharge information has been reviewed with the patient. The patient verbalized understanding.     Discharge medications reviewed with the patient and appropriate educational materials and side effects teaching were provided. [employer] Activation    Thank you for requesting access to [employer]. Please follow the instructions below to securely access and download your online medical record. [employer] allows you to send messages to your doctor, view your test results, renew your prescriptions, schedule appointments, and more. How Do I Sign Up? 1. In your internet browser, go to [email or web address]. 2. Click on the First Time User? Click Here link in the Sign In box. You will see the New Member Sign Up page. 3. Enter your [employer] Access Code exactly as it appears below. You will not need to use this code after youve completed the sign-up process. If you do not sign up before the expiration date, you must request a new code. [employer] Access Code: [facility]  Expires: 2017  7:31 AM (This is the date your [employer] access code will )    4. Enter the last four digits of your Social Security Number (xxxx) and Date of Birth (mm/dd/yyyy) as indicated and click Submit. You will be taken to the next sign-up page. 5. Create a [employer] ID. This will be your [employer] login ID and cannot be changed, so think of one that is secure and easy to remember. 6. Create a [employer] password. You can change your password at any time. 7. Enter your Password Reset Question and Answer. This can be used at a later time if you forget your password. 8. Enter your e-mail address. You will receive e-mail notification when new information is available in [address]. 9. Click Sign Up. You can now view and download portions of your medical record. 10. Click the Download Summary menu link to download a portable copy of your medical information. Additional Information    If you have questions, please visit the Frequently Asked Questions section of the [employer] website at [email or web address] Remember, [employer] is NOT to be used for urgent needs. For medical emergencies, dial 911.     Patient armband removed and shredded

## 2017-02-14 NOTE — ROUTINE PROCESS
Oxygen qualification test done as ordered. Oxygen level at. 93% on room air and at rest and 88% while ambulating on room air. . 97% oxygen level while ambulating on 2lpm

## 2017-02-14 NOTE — PROGRESS NOTES
According to implemented assessment findings, patient qualifies for home O2. All needed paperwork faxed to [facility] choice fax # [phone]. CM spoke to [facility], informed her this patient is being dc home and will need O2 to be provided prior she leaves the hospital. Order for bsc faxed to [address] [place] [address] as well. MARSHA Mills rn,      4:56 PM cm called [facility] [phone], spoke with intake. After checking, she stated there is \"something wrong' with printer , they received only portion of papers faxed earlier and asked to fax then over again. CM informed the intake of the importance of completing this order because the patient will not be able to be dc home w/o O2. Needed paperwork faxed over again. CM asked [facility] choice intake to contact [address] and to informed of the time for O2, bsc delivery.

## 2017-02-14 NOTE — ROUTINE PROCESS
Bedside and Verbal shift change report given to Jose J Ugarte   (oncoming nurse) by Compa Lowe (offgoing nurse). Report included the following information SBAR, Kardex, Intake/Output and MAR.

## 2017-02-14 NOTE — PROGRESS NOTES
ENDOCRINOLOGY    Glucose this morning 100, as steroids are tapered will lower Lantus dosage. Recommend further reduction of Lantus to 10 units daily.

## 2017-02-14 NOTE — PROGRESS NOTES
[facility]   Pulmonary and Sleep Medicine     Pulmonary Progress Note    Name: Viv Villarreal   : 1960   MRN: [id number]   Date: 2017    [x]I have reviewed the flowsheet and previous days notes. Events, vitals, medications and notes from last 24 hours reviewed. Care plan discussed with nursing and  patient. IMPRESSION:   · COPD exacerbation with improved wheezing, mild hypoxia and improved work of breathing - no clinical or radiological evidence of pneumonia and etiology to explain patient's symptoms, negative D Dimer- doubt VTE. Patient with moderate to severe COPD at baseline - improved overall clinically but still with bronchitis and wheezing  · Elevated lactate - resolved -most likely related to increased work of breathing and respiratory muscle fatigue  · Morbid obesity, SHERRIE - on CPAP at home  · Hx of hypertension, chronic diastolic CHF  · Hx of GERD  · Hx of diverticulosis  · Former nicotine dependence    PLAN:   BiPAP at night, and on PRN basis during day- adjust settings per goal SPO2> 88%. May eventually transition to home CPAP   Supplemental O2 via NC when off of BiPAP, titrate flow for goal SPO2> 88%. Assess for home needs prior to discharge  Continue bronchodilators- Brovana and Pulmicort for patient's ease of use- may switch to Advair HFA at [place] home  Steroids - Continue tapering dose prednisone   Antibiotic choice: Levaquin - for five days (last dose 17) . Completed.   Sputum culture - follow final report ( pending)  Aspiration prevention bundle, head of the bed at 30' all times, pulmonary hygiene care  Glycemic control as needed while on steroids  Stress ulcer prophylaxis  DVT prophylaxis  OutPatient follow up- [facility]  Pulmonary rehab- has enrolled at [facility]  Complete cessation of smoking  Will defer respective systems problem management to primary and other consultant and follow patient with primary and other team. Subjective:   17  Reports interval improvement but still with wheezing and needing BiPAP at night. Coughing green mucus-moderate amounts. Denies chest pain. Anxious to mobilize OOB. Review of Systems   Constitutional: Negative. HENT: Negative. Eyes: Negative. Cardiovascular: Negative. Gastrointestinal: Negative. Genitourinary: Negative. Musculoskeletal: Negative. Skin: Negative. Neurological: Negative. Endo/Heme/Allergies: Negative. Psychiatric/Behavioral: Negative. Vital Signs:    Blood pressure 111/72, pulse 65, temperature 97.7 °F (36.5 °C), resp. rate 20, height 5' 3\" (1.6 m), weight 111.6 kg (246 lb), SpO2 91 %. Body mass index is 43.58 kg/(m^2). O2 Device: BIPAP   O2 Flow Rate (L/min): 2 l/min   Temp (24hrs), Av.6 °F (36.4 °C), Min:97.1 °F (36.2 °C), Max:98 °F (36.7 °C)       Intake/Output:   Last shift:         Last 3 shifts:  1901 -  0700  In: 1200 [P.O.:1200]  Out: 2300 [Urine:2300]    Intake/Output Summary (Last 24 hours) at 17 0906  Last data filed at 17 1745   Gross per 24 hour   Intake              720 ml   Output             1600 ml   Net             -880 ml       Physical Exam   Constitutional: She is oriented to person, place, and time. She appears well-developed. HENT:   Head: Normocephalic and atraumatic. Eyes: EOM are normal. Pupils are equal, round, and reactive to light. Neck: Normal range of motion. Neck supple. Cardiovascular: Normal rate and regular rhythm. Pulmonary/Chest: Effort normal. She has wheezes. Moderate air entry on both side    Abdominal: Soft. Musculoskeletal: Normal range of motion. She exhibits no edema. Neurological: She is alert and oriented to person, place, and time. Skin: Skin is warm and dry. Psychiatric: She has a normal mood and affect.  Her behavior is normal.       DATA:   Current Facility-Administered Medications   Medication Dose Route Frequency    insulin glargine (LANTUS) injection 20 Units  20 Units SubCUTAneous DAILY    SITagliptin (JANUVIA) tablet 100 mg  100 mg Oral DAILY    cholecalciferol (vitamin D3) (VITAMIN D3) wafer 50,000 Units  50,000 Units Oral DAILY    predniSONE (DELTASONE) tablet 40 mg  40 mg Oral DAILY WITH BREAKFAST    insulin lispro (HUMALOG) injection   SubCUTAneous AC&HS    guaiFENesin SR (MUCINEX) tablet 600 mg  600 mg Oral BID    albuterol-ipratropium (DUO-NEB) 2.5 MG-0.5 MG/3 ML  3 mL Nebulization Q4H PRN    arformoterol (BROVANA) neb solution 15 mcg  15 mcg Nebulization BID RT    budesonide (PULMICORT) 500 mcg/2 ml nebulizer suspension  500 mcg Nebulization BID RT    enoxaparin (LOVENOX) injection 40 mg  40 mg SubCUTAneous Q24H    acetaminophen (TYLENOL) tablet 650 mg  650 mg Oral Q4H PRN    glucose chewable tablet 16 g  4 Tab Oral PRN    glucagon (GLUCAGEN) injection 1 mg  1 mg IntraMUSCular PRN    dextrose (D50W) injection syrg 12.5-25 g  25-50 mL IntraVENous PRN    aspirin delayed-release tablet 81 mg  81 mg Oral DAILY    pantoprazole (PROTONIX) tablet 40 mg  40 mg Oral ACB    lisinopril (PRINIVIL, ZESTRIL) tablet 40 mg  40 mg Oral DAILY                    Labs:  Recent Results (from the past 24 hour(s))   GLUCOSE, POC    Collection Time: 02/13/17 11:33 AM   Result Value Ref Range    Glucose (POC) 151 (H) 70 - 110 mg/dL   POTASSIUM    Collection Time: 02/13/17  3:05 PM   Result Value Ref Range    Potassium 4.1 3.5 - 5.5 mmol/L   GLUCOSE, POC    Collection Time: 02/13/17  5:02 PM   Result Value Ref Range    Glucose (POC) 236 (H) 70 - 110 mg/dL   CBC WITH AUTOMATED DIFF    Collection Time: 02/14/17  3:41 AM   Result Value Ref Range    WBC 7.6 4.6 - 13.2 K/uL    RBC 4.28 4.20 - 5.30 M/uL    HGB 13.0 12.0 - 16.0 g/dL    HCT 38.8 35.0 - 45.0 %    MCV 90.7 74.0 - 97.0 FL    MCH 30.4 24.0 - 34.0 PG    MCHC 33.5 31.0 - 37.0 g/dL    RDW 13.3 11.6 - 14.5 %    PLATELET 956 430 - 071 K/uL    MPV 9.2 9.2 - 11.8 FL    NEUTROPHILS 49 42 - 75 % LYMPHOCYTES 41 20 - 51 %    MONOCYTES 10 (H) 2 - 9 %    EOSINOPHILS 0 0 - 5 %    BASOPHILS 0 0 - 3 %    ABS. NEUTROPHILS 3.7 1.8 - 8.0 K/UL    ABS. LYMPHOCYTES 3.1 0.8 - 3.5 K/UL    ABS. MONOCYTES 0.8 0 - 1.0 K/UL    ABS. EOSINOPHILS 0.0 0.0 - 0.4 K/UL    ABS. BASOPHILS 0.0 0.0 - 0.06 K/UL    DF MANUAL      PLATELET COMMENTS ADEQUATE PLATELETS      RBC COMMENTS NORMOCYTIC, NORMOCHROMIC     METABOLIC PANEL, BASIC    Collection Time: 02/14/17  3:41 AM   Result Value Ref Range    Sodium 139 136 - 145 mmol/L    Potassium 3.5 3.5 - 5.5 mmol/L    Chloride 101 100 - 108 mmol/L    CO2 29 21 - 32 mmol/L    Anion gap 9 3.0 - 18 mmol/L    Glucose 125 (H) 74 - 99 mg/dL    BUN 18 7.0 - 18 MG/DL    Creatinine 0.86 0.6 - 1.3 MG/DL    BUN/Creatinine ratio 21 (H) 12 - 20      GFR est AA >60 >60 ml/min/1.73m2    GFR est non-AA >60 >60 ml/min/1.73m2    Calcium 8.9 8.5 - 10.1 MG/DL   GLUCOSE, POC    Collection Time: 02/14/17  7:39 AM   Result Value Ref Range    Glucose (POC) 100 70 - 110 mg/dL           No results for input(s): FIO2I, IFO2, HCO3I, IHCO3, HCOPOC, PCO2I, PCOPOC, IPHI, PHI, PHPOC, PO2I, PO2POC in the last 72 hours.     No lab exists for component: IPOC2  All Micro Results     Procedure Component Value Units Date/Time    CULTURE, BLOOD [[id number]] Collected:  02/08/17 1614    Order Status:  Completed Specimen:  Whole Blood from Blood Updated:  02/14/17 0814     Special Requests: NO SPECIAL REQUESTS        Culture result: NO GROWTH 6 DAYS       CULTURE, BLOOD [[id number]] Collected:  02/08/17 1645    Order Status:  Completed Specimen:  Whole Blood from Blood Updated:  02/14/17 0814     Special Requests: NO SPECIAL REQUESTS        Culture result: NO GROWTH 6 DAYS       CULTURE, RESPIRATORY/SPUTUM/BRONCH Shazia Bracket STAIN [[id number]] Collected:  02/09/17 1711    Order Status:  Completed Specimen:  Sputum from Sputum Updated:  02/11/17 1302     Special Requests: NO SPECIAL REQUESTS        GRAM STAIN >25 WBC/lpf         <10 EPI/lpf MUCUS PRESENT         FEW  GRAM POSITIVE COCCI  IN PAIRS         RARE  GRAM POSITIVE RODS         RARE  GRAM NEGATIVE RODS        Culture result: MODERATE  NORMAL RESPIRATORY TOAN                 Imaging:  [x]I have personally reviewed the patients chest radiographs images and report with the patient      Results from Hospital Encounter encounter on 02/08/17   XR CHEST PORT   Narrative Procedure:  Chest portable. Indication:  Shortness of breath. Comparison:  9/18/16, 6/3/15. Findings: An obliquely oriented bandlike density is identified in the right  lung base. A similar density was noted on 9/18/2016 and 6/3/2015. A focus of  hazy density with circumscribed margins is identified in the left lower lung  zone adjacent to the cardiac apex. This density is found to be related to a  pericardial fat pad on 6/3/15. No new infiltrate or consolidation is  identified. The cardiac silhouette is mildly prominent. Impression Impression:    1. Stable bilateral lower lung zone densities. 2.  No new infiltrate or consolidation. Results from [place] encounter on 09/05/13   CTA CHEST W WO CONT   Narrative CTA Chest With Contrast Pulmonary Arterial Exam With Maximum Intensity  Projections (MIPs)    CPT CODE: 38025    INDICATION:    , H/P ASTHMA AND COPD-NOW WITH UNRESLVING SOB-CHEST  PRESSURE/HEAVINESS-EVALUATE FOR MASS AND PE.  coronal and sagittal MIPs  were obtained to better evaluate the pulmonary  arteries in a three dimensional angiographic method to evaluate for possible  pulmonary emboli    COMPARISON: CT chest March 19, 2010. TECHNIQUE: Initial localizing images were obtained without intravenous  contrast. Standard helical images were obtained from the thoracic inlet through  the adrenals at 2.5 mm thick sections following the intravenous injection of a  bolus of 100 cc nonionic contrast.  Patient was premedicated with the Radin  steroid prep.  Images were reviewed on both soft tissue, lung, and bone window  settings. FINDINGS:    The quality of opacification of the pulmonary arteries is insufficient to  exclude peripheral PE. There are no filling defects within the right and left  main pulmonary arteries. There is no evidence of mediastinal, hilar, nor axillary adenopathy. The great vessels and thoracic aorta are unremarkable. There are no pleural effusions. The lungs are well inflated. Minimal linear scarring within the right middle  lobe. .    The included portion of the of the liver is unremarkable. The adrenal glands  are normal.    The chest wall soft tissues are unremarkable. Reconstructions: Coronal and sagittal MIPs ( maximum intensity projections)  were obtained from the axial acquisition. The pulmonary arteries branch  normally. There is insufficient opacification to exclude filling defects. Impression IMPRESSION:    There is insufficient contrast and increased mottle within the pulmonary  arteries to exclude PE. Recommend followup VQ scan. Wet reading, orange alert provided to Dr. Kim Childers   at 1341 hrs by  Jeyson Skaggs.          [x]See my orders for details    My assessment, plan of care, findings, medications, side effects etc were discussed with:  [x]nursing []PT/OT    []respiratory therapy    []family [x]Patient       Ed Villalta MD

## 2017-02-16 NOTE — PROGRESS NOTES
CM called [facility], spoke to [facility], she stated pt was provided with PORTABLE concentrator.  Eriberto Gaytan rn

## 2017-02-17 ENCOUNTER — HOME CARE VISIT (OUTPATIENT)
Dept: HOME HEALTH SERVICES | Facility: HOME HEALTH | Age: 57
End: 2017-02-17

## 2017-02-18 ENCOUNTER — HOME CARE VISIT (OUTPATIENT)
Dept: SCHEDULING | Facility: HOME HEALTH | Age: 57
End: 2017-02-18
Payer: MEDICARE

## 2017-02-18 PROCEDURE — 3331090001 HH PPS REVENUE CREDIT

## 2017-02-18 PROCEDURE — 3331090002 HH PPS REVENUE DEBIT

## 2017-02-18 PROCEDURE — G0299 HHS/HOSPICE OF RN EA 15 MIN: HCPCS

## 2017-02-18 PROCEDURE — 400013 HH SOC

## 2017-02-19 VITALS
DIASTOLIC BLOOD PRESSURE: 78 MMHG | SYSTOLIC BLOOD PRESSURE: 122 MMHG | TEMPERATURE: 97 F | WEIGHT: 247 LBS | HEART RATE: 64 BPM | HEIGHT: 63 IN | RESPIRATION RATE: 16 BRPM | BODY MASS INDEX: 43.77 KG/M2

## 2017-02-19 PROCEDURE — 3331090001 HH PPS REVENUE CREDIT

## 2017-02-19 PROCEDURE — 3331090002 HH PPS REVENUE DEBIT

## 2017-02-20 ENCOUNTER — HOME CARE VISIT (OUTPATIENT)
Dept: HOME HEALTH SERVICES | Facility: HOME HEALTH | Age: 57
End: 2017-02-20
Payer: MEDICARE

## 2017-02-20 PROCEDURE — 3331090001 HH PPS REVENUE CREDIT

## 2017-02-20 PROCEDURE — 3331090002 HH PPS REVENUE DEBIT

## 2017-02-21 ENCOUNTER — HOME CARE VISIT (OUTPATIENT)
Dept: SCHEDULING | Facility: HOME HEALTH | Age: 57
End: 2017-02-21
Payer: MEDICARE

## 2017-02-21 VITALS — SYSTOLIC BLOOD PRESSURE: 122 MMHG | OXYGEN SATURATION: 98 % | DIASTOLIC BLOOD PRESSURE: 80 MMHG | HEART RATE: 84 BPM

## 2017-02-21 PROCEDURE — G0152 HHCP-SERV OF OT,EA 15 MIN: HCPCS

## 2017-02-21 PROCEDURE — 3331090001 HH PPS REVENUE CREDIT

## 2017-02-21 PROCEDURE — 3331090002 HH PPS REVENUE DEBIT

## 2017-02-22 ENCOUNTER — HOME CARE VISIT (OUTPATIENT)
Dept: SCHEDULING | Facility: HOME HEALTH | Age: 57
End: 2017-02-22
Payer: MEDICARE

## 2017-02-22 PROCEDURE — G0299 HHS/HOSPICE OF RN EA 15 MIN: HCPCS

## 2017-02-22 PROCEDURE — 3331090002 HH PPS REVENUE DEBIT

## 2017-02-22 PROCEDURE — 3331090001 HH PPS REVENUE CREDIT

## 2017-02-23 PROCEDURE — 3331090001 HH PPS REVENUE CREDIT

## 2017-02-23 PROCEDURE — 3331090002 HH PPS REVENUE DEBIT

## 2017-02-24 ENCOUNTER — HOME CARE VISIT (OUTPATIENT)
Dept: HOME HEALTH SERVICES | Facility: HOME HEALTH | Age: 57
End: 2017-02-24
Payer: MEDICARE

## 2017-02-24 ENCOUNTER — HOME CARE VISIT (OUTPATIENT)
Dept: SCHEDULING | Facility: HOME HEALTH | Age: 57
End: 2017-02-24
Payer: MEDICARE

## 2017-02-24 VITALS — OXYGEN SATURATION: 98 % | SYSTOLIC BLOOD PRESSURE: 136 MMHG | DIASTOLIC BLOOD PRESSURE: 77 MMHG | HEART RATE: 71 BPM

## 2017-02-24 PROCEDURE — 3331090001 HH PPS REVENUE CREDIT

## 2017-02-24 PROCEDURE — 3331090002 HH PPS REVENUE DEBIT

## 2017-02-24 PROCEDURE — G0158 HHC OT ASSISTANT EA 15: HCPCS

## 2017-02-25 ENCOUNTER — HOME CARE VISIT (OUTPATIENT)
Dept: HOME HEALTH SERVICES | Facility: HOME HEALTH | Age: 57
End: 2017-02-25
Payer: MEDICARE

## 2017-02-25 PROCEDURE — 3331090002 HH PPS REVENUE DEBIT

## 2017-02-25 PROCEDURE — 3331090001 HH PPS REVENUE CREDIT

## 2017-02-26 PROCEDURE — 3331090002 HH PPS REVENUE DEBIT

## 2017-02-26 PROCEDURE — 3331090001 HH PPS REVENUE CREDIT

## 2017-02-27 ENCOUNTER — HOME CARE VISIT (OUTPATIENT)
Dept: SCHEDULING | Facility: HOME HEALTH | Age: 57
End: 2017-02-27
Payer: MEDICARE

## 2017-02-27 VITALS — OXYGEN SATURATION: 97 % | DIASTOLIC BLOOD PRESSURE: 88 MMHG | HEART RATE: 91 BPM | SYSTOLIC BLOOD PRESSURE: 163 MMHG

## 2017-02-27 PROCEDURE — 3331090001 HH PPS REVENUE CREDIT

## 2017-02-27 PROCEDURE — 3331090002 HH PPS REVENUE DEBIT

## 2017-02-27 PROCEDURE — G0158 HHC OT ASSISTANT EA 15: HCPCS

## 2017-02-27 PROCEDURE — G0299 HHS/HOSPICE OF RN EA 15 MIN: HCPCS

## 2017-02-28 PROCEDURE — 3331090002 HH PPS REVENUE DEBIT

## 2017-02-28 PROCEDURE — 3331090001 HH PPS REVENUE CREDIT

## 2017-03-01 ENCOUNTER — HOME CARE VISIT (OUTPATIENT)
Dept: SCHEDULING | Facility: HOME HEALTH | Age: 57
End: 2017-03-01
Payer: MEDICARE

## 2017-03-01 VITALS
SYSTOLIC BLOOD PRESSURE: 140 MMHG | DIASTOLIC BLOOD PRESSURE: 80 MMHG | HEART RATE: 78 BPM | RESPIRATION RATE: 16 BRPM | TEMPERATURE: 98 F | OXYGEN SATURATION: 98 %

## 2017-03-01 VITALS — SYSTOLIC BLOOD PRESSURE: 135 MMHG | HEART RATE: 81 BPM | OXYGEN SATURATION: 97 % | DIASTOLIC BLOOD PRESSURE: 82 MMHG

## 2017-03-01 VITALS
SYSTOLIC BLOOD PRESSURE: 150 MMHG | TEMPERATURE: 97.9 F | OXYGEN SATURATION: 98 % | DIASTOLIC BLOOD PRESSURE: 82 MMHG | HEART RATE: 73 BPM

## 2017-03-01 PROCEDURE — 3331090002 HH PPS REVENUE DEBIT

## 2017-03-01 PROCEDURE — 3331090001 HH PPS REVENUE CREDIT

## 2017-03-01 PROCEDURE — G0152 HHCP-SERV OF OT,EA 15 MIN: HCPCS

## 2017-03-01 PROCEDURE — G0151 HHCP-SERV OF PT,EA 15 MIN: HCPCS

## 2017-03-02 PROCEDURE — 3331090001 HH PPS REVENUE CREDIT

## 2017-03-02 PROCEDURE — 3331090002 HH PPS REVENUE DEBIT

## 2017-03-03 ENCOUNTER — HOME CARE VISIT (OUTPATIENT)
Dept: SCHEDULING | Facility: HOME HEALTH | Age: 57
End: 2017-03-03
Payer: MEDICARE

## 2017-03-03 VITALS — DIASTOLIC BLOOD PRESSURE: 90 MMHG | HEART RATE: 64 BPM | OXYGEN SATURATION: 98 % | SYSTOLIC BLOOD PRESSURE: 140 MMHG

## 2017-03-03 PROCEDURE — 3331090002 HH PPS REVENUE DEBIT

## 2017-03-03 PROCEDURE — G0299 HHS/HOSPICE OF RN EA 15 MIN: HCPCS

## 2017-03-03 PROCEDURE — 3331090001 HH PPS REVENUE CREDIT

## 2017-03-03 PROCEDURE — G0157 HHC PT ASSISTANT EA 15: HCPCS

## 2017-03-04 VITALS
TEMPERATURE: 99 F | HEART RATE: 74 BPM | OXYGEN SATURATION: 98 % | SYSTOLIC BLOOD PRESSURE: 142 MMHG | DIASTOLIC BLOOD PRESSURE: 86 MMHG | RESPIRATION RATE: 22 BRPM

## 2017-03-04 VITALS
DIASTOLIC BLOOD PRESSURE: 80 MMHG | RESPIRATION RATE: 16 BRPM | HEART RATE: 78 BPM | TEMPERATURE: 97 F | SYSTOLIC BLOOD PRESSURE: 150 MMHG | OXYGEN SATURATION: 98 %

## 2017-03-04 PROCEDURE — 3331090002 HH PPS REVENUE DEBIT

## 2017-03-04 PROCEDURE — 3331090001 HH PPS REVENUE CREDIT

## 2017-03-05 PROCEDURE — 3331090001 HH PPS REVENUE CREDIT

## 2017-03-05 PROCEDURE — 3331090002 HH PPS REVENUE DEBIT

## 2017-03-06 ENCOUNTER — HOME CARE VISIT (OUTPATIENT)
Dept: SCHEDULING | Facility: HOME HEALTH | Age: 57
End: 2017-03-06
Payer: MEDICARE

## 2017-03-06 VITALS — OXYGEN SATURATION: 98 % | SYSTOLIC BLOOD PRESSURE: 130 MMHG | HEART RATE: 89 BPM | DIASTOLIC BLOOD PRESSURE: 78 MMHG

## 2017-03-06 PROCEDURE — 3331090002 HH PPS REVENUE DEBIT

## 2017-03-06 PROCEDURE — 3331090001 HH PPS REVENUE CREDIT

## 2017-03-06 PROCEDURE — G0157 HHC PT ASSISTANT EA 15: HCPCS

## 2017-03-06 PROCEDURE — G0299 HHS/HOSPICE OF RN EA 15 MIN: HCPCS

## 2017-03-07 ENCOUNTER — HOME CARE VISIT (OUTPATIENT)
Dept: HOME HEALTH SERVICES | Facility: HOME HEALTH | Age: 57
End: 2017-03-07
Payer: MEDICARE

## 2017-03-07 PROCEDURE — 3331090002 HH PPS REVENUE DEBIT

## 2017-03-07 PROCEDURE — 3331090001 HH PPS REVENUE CREDIT

## 2017-03-08 ENCOUNTER — HOME CARE VISIT (OUTPATIENT)
Dept: SCHEDULING | Facility: HOME HEALTH | Age: 57
End: 2017-03-08
Payer: MEDICARE

## 2017-03-08 VITALS — OXYGEN SATURATION: 97 % | DIASTOLIC BLOOD PRESSURE: 70 MMHG | SYSTOLIC BLOOD PRESSURE: 115 MMHG | HEART RATE: 97 BPM

## 2017-03-08 PROCEDURE — 3331090002 HH PPS REVENUE DEBIT

## 2017-03-08 PROCEDURE — 3331090001 HH PPS REVENUE CREDIT

## 2017-03-08 PROCEDURE — G0151 HHCP-SERV OF PT,EA 15 MIN: HCPCS

## 2017-03-09 ENCOUNTER — HOME CARE VISIT (OUTPATIENT)
Dept: SCHEDULING | Facility: HOME HEALTH | Age: 57
End: 2017-03-09
Payer: MEDICARE

## 2017-03-09 VITALS — OXYGEN SATURATION: 97 % | SYSTOLIC BLOOD PRESSURE: 122 MMHG | HEART RATE: 88 BPM | DIASTOLIC BLOOD PRESSURE: 82 MMHG

## 2017-03-09 PROCEDURE — 3331090002 HH PPS REVENUE DEBIT

## 2017-03-09 PROCEDURE — 3331090001 HH PPS REVENUE CREDIT

## 2017-03-09 PROCEDURE — G0157 HHC PT ASSISTANT EA 15: HCPCS

## 2017-03-10 VITALS
RESPIRATION RATE: 18 BRPM | TEMPERATURE: 98.2 F | HEART RATE: 78 BPM | OXYGEN SATURATION: 98 % | DIASTOLIC BLOOD PRESSURE: 70 MMHG | SYSTOLIC BLOOD PRESSURE: 130 MMHG

## 2017-03-10 PROCEDURE — 3331090002 HH PPS REVENUE DEBIT

## 2017-03-10 PROCEDURE — 3331090001 HH PPS REVENUE CREDIT

## 2017-03-11 PROCEDURE — 3331090001 HH PPS REVENUE CREDIT

## 2017-03-11 PROCEDURE — 3331090002 HH PPS REVENUE DEBIT

## 2017-03-12 PROCEDURE — 3331090001 HH PPS REVENUE CREDIT

## 2017-03-12 PROCEDURE — 3331090002 HH PPS REVENUE DEBIT

## 2017-03-13 PROCEDURE — 3331090002 HH PPS REVENUE DEBIT

## 2017-03-13 PROCEDURE — 3331090001 HH PPS REVENUE CREDIT

## 2017-03-14 ENCOUNTER — HOME CARE VISIT (OUTPATIENT)
Dept: SCHEDULING | Facility: HOME HEALTH | Age: 57
End: 2017-03-14
Payer: MEDICARE

## 2017-03-14 ENCOUNTER — HOME CARE VISIT (OUTPATIENT)
Dept: HOME HEALTH SERVICES | Facility: HOME HEALTH | Age: 57
End: 2017-03-14
Payer: MEDICARE

## 2017-03-14 VITALS — HEART RATE: 59 BPM | SYSTOLIC BLOOD PRESSURE: 140 MMHG | OXYGEN SATURATION: 98 % | DIASTOLIC BLOOD PRESSURE: 80 MMHG

## 2017-03-14 PROCEDURE — 3331090002 HH PPS REVENUE DEBIT

## 2017-03-14 PROCEDURE — G0157 HHC PT ASSISTANT EA 15: HCPCS

## 2017-03-14 PROCEDURE — 3331090001 HH PPS REVENUE CREDIT

## 2017-03-15 PROCEDURE — 3331090002 HH PPS REVENUE DEBIT

## 2017-03-15 PROCEDURE — 3331090001 HH PPS REVENUE CREDIT

## 2017-03-16 ENCOUNTER — HOME CARE VISIT (OUTPATIENT)
Dept: SCHEDULING | Facility: HOME HEALTH | Age: 57
End: 2017-03-16
Payer: MEDICARE

## 2017-03-16 VITALS — OXYGEN SATURATION: 98 % | HEART RATE: 72 BPM | SYSTOLIC BLOOD PRESSURE: 142 MMHG | DIASTOLIC BLOOD PRESSURE: 80 MMHG

## 2017-03-16 PROCEDURE — 3331090002 HH PPS REVENUE DEBIT

## 2017-03-16 PROCEDURE — G0157 HHC PT ASSISTANT EA 15: HCPCS

## 2017-03-16 PROCEDURE — 3331090001 HH PPS REVENUE CREDIT

## 2017-03-17 PROCEDURE — 3331090001 HH PPS REVENUE CREDIT

## 2017-03-17 PROCEDURE — 3331090002 HH PPS REVENUE DEBIT

## 2017-03-18 PROCEDURE — 3331090002 HH PPS REVENUE DEBIT

## 2017-03-18 PROCEDURE — 3331090001 HH PPS REVENUE CREDIT

## 2017-03-19 PROCEDURE — 3331090001 HH PPS REVENUE CREDIT

## 2017-03-19 PROCEDURE — 3331090002 HH PPS REVENUE DEBIT

## 2017-03-20 PROCEDURE — 3331090001 HH PPS REVENUE CREDIT

## 2017-03-20 PROCEDURE — 3331090002 HH PPS REVENUE DEBIT

## 2017-03-21 ENCOUNTER — HOSPITAL ENCOUNTER (OUTPATIENT)
Dept: LAB | Age: 57
Discharge: HOME | End: 2017-03-21

## 2017-03-21 ENCOUNTER — HOSPITAL ENCOUNTER (OUTPATIENT)
Dept: GENERAL RADIOLOGY | Age: 57
Discharge: HOME | End: 2017-03-21
Payer: MEDICARE

## 2017-03-21 DIAGNOSIS — R06.02 SOB (SHORTNESS OF BREATH) ON EXERTION: ICD-10-CM

## 2017-03-21 PROCEDURE — 3331090001 HH PPS REVENUE CREDIT

## 2017-03-21 PROCEDURE — 99001 SPECIMEN HANDLING PT-LAB: CPT | Performed by: FAMILY MEDICINE

## 2017-03-21 PROCEDURE — 3331090002 HH PPS REVENUE DEBIT

## 2017-03-21 PROCEDURE — 71020 XR CHEST PA LAT: CPT

## 2017-03-22 ENCOUNTER — HOME CARE VISIT (OUTPATIENT)
Dept: SCHEDULING | Facility: HOME HEALTH | Age: 57
End: 2017-03-22
Payer: MEDICARE

## 2017-03-22 PROCEDURE — 3331090001 HH PPS REVENUE CREDIT

## 2017-03-22 PROCEDURE — 3331090003 HH PPS REVENUE ADJ

## 2017-03-22 PROCEDURE — 3331090002 HH PPS REVENUE DEBIT

## 2017-03-22 PROCEDURE — G0151 HHCP-SERV OF PT,EA 15 MIN: HCPCS

## 2017-03-23 PROCEDURE — 3331090002 HH PPS REVENUE DEBIT

## 2017-03-23 PROCEDURE — 3331090001 HH PPS REVENUE CREDIT

## 2017-03-24 VITALS — OXYGEN SATURATION: 97 % | DIASTOLIC BLOOD PRESSURE: 96 MMHG | SYSTOLIC BLOOD PRESSURE: 167 MMHG | HEART RATE: 60 BPM

## 2017-03-24 PROCEDURE — 3331090002 HH PPS REVENUE DEBIT

## 2017-03-24 PROCEDURE — 3331090001 HH PPS REVENUE CREDIT

## 2017-03-25 PROCEDURE — 3331090001 HH PPS REVENUE CREDIT

## 2017-03-25 PROCEDURE — 3331090002 HH PPS REVENUE DEBIT

## 2017-03-26 PROCEDURE — 3331090001 HH PPS REVENUE CREDIT

## 2017-03-26 PROCEDURE — 3331090002 HH PPS REVENUE DEBIT

## 2017-03-27 PROCEDURE — 3331090002 HH PPS REVENUE DEBIT

## 2017-03-27 PROCEDURE — 3331090001 HH PPS REVENUE CREDIT

## 2017-03-28 PROCEDURE — 3331090001 HH PPS REVENUE CREDIT

## 2017-03-28 PROCEDURE — 3331090002 HH PPS REVENUE DEBIT

## 2017-03-29 PROCEDURE — 3331090002 HH PPS REVENUE DEBIT

## 2017-03-29 PROCEDURE — 3331090001 HH PPS REVENUE CREDIT

## 2017-03-30 PROCEDURE — 3331090001 HH PPS REVENUE CREDIT

## 2017-03-30 PROCEDURE — 3331090002 HH PPS REVENUE DEBIT

## 2017-04-10 ENCOUNTER — HOSPITAL ENCOUNTER (OUTPATIENT)
Dept: ULTRASOUND IMAGING | Age: 57
Discharge: HOME | End: 2017-04-10
Attending: FAMILY MEDICINE
Payer: MEDICARE

## 2017-04-10 DIAGNOSIS — R10.13 ABDOMINAL PAIN, EPIGASTRIC: ICD-10-CM

## 2017-04-10 DIAGNOSIS — R10.13 EPIGASTRIC PAIN: ICD-10-CM

## 2017-04-10 DIAGNOSIS — K21.9 GASTROESOPHAGEAL REFLUX DISEASE: ICD-10-CM

## 2017-04-10 PROCEDURE — 76700 US EXAM ABDOM COMPLETE: CPT

## 2017-05-15 ENCOUNTER — OFFICE VISIT (OUTPATIENT)
Dept: PULMONOLOGY | Age: 57
End: 2017-05-15

## 2017-05-15 VITALS
HEIGHT: 63 IN | TEMPERATURE: 98.1 F | OXYGEN SATURATION: 97 % | HEART RATE: 99 BPM | DIASTOLIC BLOOD PRESSURE: 90 MMHG | WEIGHT: 245 LBS | SYSTOLIC BLOOD PRESSURE: 134 MMHG | RESPIRATION RATE: 14 BRPM | BODY MASS INDEX: 43.41 KG/M2

## 2017-05-15 DIAGNOSIS — J44.1 COPD EXACERBATION (HCC): Primary | ICD-10-CM

## 2017-05-15 NOTE — LETTER
Request for Examination Exam Date: 05/15/2017 Patient's Name:  Hussein Kerr SS#:  [id number] :  1960 Pregnant: No 
 
Address:  [address] [address] Phone#:  [phone] (home) Ordering Physician: Tiny Lucas MD 
 
Technician: Hanna Saini LPN Insurance Information: See Attached Exams Ordered: CXR PA/LAT Clinical Data/ Brief History: COPD  J44.1 Preliminary Exam Report: 
 
 
 
 
 
 
 
 
___________________________  __________________ ___________ Radiologist                  Date         Time

## 2017-05-15 NOTE — PATIENT INSTRUCTIONS
Spiriva respimat 2 inhalations once daily  Advair 2 inhalations twice daily and remember to wash it off with water and spit it out  Albuterol by nebulization or by hand-held inhaler (2 puffs) every 4 hours as needed and if you require albuterol too often to control respiratory symptoms call the office for severe symptoms go to the emergency room  Always call for symptoms such as increasing shortness of breath or cough productive of discolored mucus  Use supplemental oxygen only with strenuous activity and CPAP

## 2017-05-15 NOTE — PROGRESS NOTES
[facility]  Pulmonary, Critical Care, and Sleep Medicine    Dear Marlen Mathias,    Chief complaint:  COPD chronic respiratory failure sleep apnea    HPI:  Dhara Hunt is 61years old and comes to the office today at your request for medical clearance for an endoscopy. The patient relates that she has no chest pains significant cough and mucus production hemoptysis or significant leg swelling at this time. She uses CPAP faithfully and does still have some daytime sleepiness at times and uses oxygen basically all the time as well as taking Spiriva and Advair faithfully and only occasionally uses her albuterol. She reports dyspnea with exertion but relates it is stable. Allergies   Allergen Reactions    Ancef [Cefazolin] Hives    Contrast Agent [Iodine] Anaphylaxis, Shortness of Breath and Swelling     Needs pre-medication for IV contrast with Benadryl, Solu-Medrol    Fish Containing Products Anaphylaxis     Pt states she had a severe allergic reaction at 7 y/o.  Metformin Other (comments)     Abdominal pain, diarrhea.  Codeine Other (comments)     Altered mental status     Current Outpatient Prescriptions   Medication Sig    metFORMIN (GLUCOPHAGE) 500 mg tablet Take 500 mg by mouth daily (with breakfast).  OXYGEN-AIR DELIVERY SYSTEMS 2 L by Nasal route continuous.  fluticasone-salmeterol (ADVAIR HFA) 230-21 mcg/actuation inhaler Take 2 Puffs by inhalation two (2) times a day.  insulin glargine (LANTUS) 100 unit/mL injection 10 Units by SubCUTAneous route nightly. Indications: type 2 diabetes mellitus    insulin lispro (HUMALOG) 100 unit/mL injection Check sugars three times a day before meals, if blood sugar is greater than 250 give yourself 8 units of Humalog before eating. Indications: type 2 diabetes mellitus    aspirin delayed-release 81 mg tablet Take 81 mg by mouth daily.  famotidine (PEPCID) 20 mg tablet Take 20 mg by mouth nightly.     lansoprazole (PREVACID) 30 mg capsule Take 30 mg by mouth Daily (before breakfast).  guaiFENesin ER (MUCINEX) 600 mg ER tablet Take 600 mg by mouth two (2) times daily as needed for Congestion.  triamcinolone acetonide (KENALOG) 0.1 % ointment Apply  to affected area two (2) times daily as needed for Skin Irritation. use thin layer    lisinopril (PRINIVIL, ZESTRIL) 10 mg tablet Take 40 mg by mouth daily.  albuterol (PROVENTIL HFA, VENTOLIN HFA, PROAIR HFA) 90 mcg/actuation inhaler Take 2 Puffs by inhalation every four (4) hours as needed for Wheezing. For the next 2 days after hospital discharge, use your inhaler 4 times a day.  tiotropium (SPIRIVA WITH HANDIHALER) 18 mcg inhalation capsule Take 1 Cap by inhalation daily.  albuterol (PROVENTIL VENTOLIN) 2.5 mg /3 mL (0.083 %) nebulizer solution 2.5 mg by Nebulization route every four (4) hours as needed for Wheezing.  fluticasone (FLONASE) 50 mcg/actuation nasal spray 2 Sprays by Both Nostrils route daily.  montelukast (SINGULAIR) 10 mg tablet Take 10 mg by mouth daily.  predniSONE (DELTASONE) 10 mg tablet Take 40 mg for 2 days, then take 20 mg for 3 days, and then take 10 mg 3 days (Patient not taking: Reported on 3/22/2017)    Diabetic Supplies, [address] [facility] Diabetic needles 1/2 inch 31 gauge - 1 injection daily    Diabetic Supplies, [employer] Diabetic syringes 1 mL - use one daily    Diabetic Supplies, [address] [facility] Diabetic test strips - use three times daily    Diabetic Supplies, [address] [facility] Diabetic lancets - use three times daily    Inhalational Spacing Device (AEROCHAMBER) 1 Each by Does Not Apply route as needed for Cough or Other (COPD exacerbation). No current facility-administered medications for this visit.       Past Medical History:   Diagnosis Date    Asthma     COPD     Cystocele, midline     Diabetes mellitus ([facility] [address]     GERD (gastroesophageal reflux disease)     Hidradenitis suppurativa     Hyperlipidemia     Hypertension     SHERRIE on CPAP     Stress incontinence    she denies a history of coronary artery disease kidney disease liver disease or ulcers tuberculosis or cancer  Past Surgical History:   Procedure Laterality Date    BREAST SURGERY PROCEDURE UNLISTED      Right breast benign tumor removal    HX APPENDECTOMY      HX CHOLECYSTECTOMY      HX DILATION AND CURETTAGE      Dysfunctional uterine bleeding, thought 2/2 fibroids    HX TUBAL LIGATION         Family history:heart disease        Social History: smoke cigarettes for many years but stopped at least 10 years ago     Review of systems:  She denies syncope focal muscle weakness and numbness trouble hearing trouble swallowing blood in her bowel movements blood in her urine rashes and reports arthritis in her knees and ankles visual disturbances and chronic abdominal pain requiring the present evaluation.   She denies fever and chills poor appetite or weight loss    Physical Exam:  Visit Vitals    /90 (BP 1 Location: Left arm, BP Patient Position: Sitting)    Pulse 99    Temp 98.1 °F (36.7 °C) (Oral)    Resp 14    Ht 5' 3\" (1.6 m)    Wt 111.1 kg (245 lb)    SpO2 97%  Comment: rest 2lpm O2    BMI 43.4 kg/m2     Wd/obese  HEENT: pupils equal, reactive, sclera, non-icteric   Oropharynx tongue: normal   Neck: Supple   Lymph Nodes: Supra clavicular and cervical nodes, negative   Chest: Equal symmetrical expansion, no dullness, no wheezes, rales or rubs   Heart: Regular, rhythm without jaquelin or murmur no carotid bruits  Abdomen: soft, non-tender no masses or organomegaly   Extremities: no cyanosis, clubbing, no edema no calf tenderness  Skin: No rash  Musculoskeletal no acute joint inflammation or muscle wasting   Neurological: alert, oriented, moves all extremities      LABS:  Chest x-ray 5/15/17 personally reviewed: No evidence of heart failure infection and a normal appearing heart size  O2 sat room air resting 97% and with walking about 600 feet her O2 sat remained at 92%  Impression:   Severe COPD as assessed by pulmonary function tests last year however she appears to be stable with no significant signs of infection by history physical exam her chest x-ray appearance and also with improved oxygenation noted on this exam    Plan:  I see no contraindication to the planned endoscopy with careful observation post procedure  Continue CPAP and supplemental oxygen only for strenuous activity and no supplemental oxygen for usual exercise or sedentary activity  Continue Spiriva 2 puffs once daily and Advair 2 puffs twice daily and as needed albuterol  Followup in 3 months  We'll obtain spirometry as well to evaluate COPD asthma status    Thanks for allowing me to share in this patient's evaluation    Sincerely,    Garrett Garza MD , [facility]           CC: MD Gregory Talamantes MD    [address] [facility] [facility] [address]  [place], [address] [address]     P: [phone]     F: [phone]

## 2017-05-15 NOTE — MR AVS SNAPSHOT
Visit Information Date & Time Provider Department Dept. Phone Encounter #  
 5/15/2017  3:45 PM Onofre Herbert MD [facility] Lisa Kasi [id number] Follow-up Instructions Return in about 3 months (around 8/15/2017). Follow-up and Disposition History Upcoming Health Maintenance Date Due Hepatitis C Screening 1960 FOOT EXAM Q1 8/21/1969 MICROALBUMIN Q1 8/21/1969 EYE EXAM RETINAL OR DILATED Q1 8/21/1969 Pneumococcal 19-64 Medium Risk (1 of 1 - PPSV23) 8/21/1978 DTaP/Tdap/Td series (1 - Tdap) 8/21/1980 PAP AKA CERVICAL CYTOLOGY 8/21/1980 FOBT Q 1 YEAR AGE 50-75 8/21/2009 LIPID PANEL Q1 11/20/2013 INFLUENZA AGE 9 TO ADULT 8/1/2017 HEMOGLOBIN A1C Q6M 8/8/2017 BREAST CANCER SCRN MAMMOGRAM 11/16/2018 Allergies as of 5/15/2017  Review Complete On: 5/15/2017 By: Rex King LPN Severity Noted Reaction Type Reactions Ancef [Cefazolin] High 09/30/2012    Hives Contrast Agent [Iodine] High 09/30/2012    Anaphylaxis, Shortness of Breath, Swelling Needs pre-medication for IV contrast with Benadryl, Solu-Medrol Fish Containing Products High 02/10/2017   Intolerance Anaphylaxis Pt states she had a severe allergic reaction at 7 y/o. Metformin  05/24/2015   Intolerance Other (comments) Abdominal pain, diarrhea. Codeine Low 09/30/2012    Other (comments) Altered mental status Current Immunizations  Never Reviewed No immunizations on file. Not reviewed this visit You Were Diagnosed With   
  
 Codes Comments COPD exacerbation ([facility]    -  Primary ICD-10-CM: J44.1 ICD-9-CM: 491.21 Vitals BP Pulse Temp Resp Height(growth percentile) Weight(growth percentile) 134/90 (BP 1 Location: Left arm, BP Patient Position: Sitting) 99 98.1 °F (36.7 °C) (Oral) 14 5' 3\" (1.6 m) 245 lb (111.1 kg) SpO2 BMI OB Status Smoking Status 97% 43.4 kg/m2 Menopause Former Smoker Vitals History BMI and BSA Data Body Mass Index Body Surface Area  
 43.4 kg/m 2 2.22 m 2 Preferred Pharmacy Pharmacy Name Phone [facility] [address] [phone] Your Updated Medication List  
  
   
This list is accurate as of: 5/15/17  5:11 PM.  Always use your most recent med list.  
  
  
  
  
 Vlad Look 170-12 mcg/actuation inhaler Generic drug:  fluticasone-salmeterol Take 2 Puffs by inhalation two (2) times a day. * albuterol 2.5 mg /3 mL (0.083 %) nebulizer solution Commonly known as:  PROVENTIL VENTOLIN  
2.5 mg by Nebulization route every four (4) hours as needed for Wheezing. * albuterol 90 mcg/actuation inhaler Commonly known as:  PROVENTIL HFA, VENTOLIN HFA, PROAIR HFA Take 2 Puffs by inhalation every four (4) hours as needed for Wheezing. For the next 2 days after hospital discharge, use your inhaler 4 times a day. aspirin delayed-release 81 mg tablet Take 81 mg by mouth daily. * Diabetic Supplies, [address] [place] 1/2 inch 31 gauge - 1 injection daily * Diabetic Supplies, [address] [facility] [place] syringes 1 mL - use one daily * Diabetic Supplies, [address] [facility] [place] [place] test strips - use three times daily * Diabetic Supplies, [address] [facility] lancets - use three times daily  
  
 famotidine 20 mg tablet Commonly known as:  PEPCID Take 20 mg by mouth nightly. FLONASE 50 mcg/actuation nasal spray Generic drug:  fluticasone 2 Sprays by Both Nostrils route daily. inhalational spacing device Commonly known as:  AEROCHAMBER  
1 Each by Does Not Apply route as needed for Cough or Other (COPD exacerbation). insulin glargine 100 unit/mL injection Commonly known as:  LANTUS  
10 Units by SubCUTAneous route nightly. Indications: type 2 diabetes mellitus insulin lispro 100 unit/mL injection Commonly known as:  HumaLOG Check sugars three times a day before meals, if blood sugar is greater than 250 give yourself 8 units of Humalog before eating. Indications: type 2 diabetes mellitus  
  
 lansoprazole 30 mg capsule Commonly known as:  PREVACID Take 30 mg by mouth Daily (before breakfast). lisinopril 10 mg tablet Commonly known as:  Valentín [place] Take 40 mg by mouth daily. metFORMIN 500 mg tablet Commonly known as:  GLUCOPHAGE Take 500 mg by mouth daily (with breakfast). MUCINEX 600 mg ER tablet Generic drug:  guaiFENesin ER Take 600 mg by mouth two (2) times daily as needed for Congestion. OXYGEN-AIR DELIVERY SYSTEMS  
2 L by Nasal route continuous. predniSONE 10 mg tablet Commonly known as:  Laqueta Maw Take 40 mg for 2 days, then take 20 mg for 3 days, and then take 10 mg 3 days SINGULAIR 10 mg tablet Generic drug:  montelukast  
Take 10 mg by mouth daily. SPIRIVA WITH HANDIHALER 18 mcg inhalation capsule Generic drug:  tiotropium Take 1 Cap by inhalation daily. triamcinolone acetonide 0.1 % ointment Commonly known as:  KENALOG Apply  to affected area two (2) times daily as needed for Skin Irritation. use thin layer * Notice: This list has 6 medication(s) that are the same as other medications prescribed for you. Read the directions carefully, and ask your doctor or other care provider to review them with you. We Performed the Following AMB POC XRAY, CHEST, 2 VIEWS, FRONTAL [37629 CPT(R)] Follow-up Instructions Return in about 3 months (around 8/15/2017). Patient Instructions Spiriva respimat 2 inhalations once daily Advair 2 inhalations twice daily and remember to wash it off with water and spit it out Albuterol by nebulization or by hand-held inhaler (2 puffs) every 4 hours as needed and if you require albuterol too often to control respiratory symptoms call the office for severe symptoms go to the emergency room Always call for symptoms such as increasing shortness of breath or cough productive of discolored mucus Use supplemental oxygen only with strenuous activity and CPAP Patient Instructions History Introducing [facility]! Alexys Gilliland introduces [employer] patient portal. Now you can access parts of your medical record, email your doctor's office, and request medication refills online. 1. In your internet browser, go to [email or web address] 2. Click on the First Time User? Click Here link in the Sign In box. You will see the New Member Sign Up page. 3. Enter your [employer] Access Code exactly as it appears below. You will not need to use this code after youve completed the sign-up process. If you do not sign up before the expiration date, you must request a new code. · [employer] Access Code: [email or web address] Expires: 8/13/2017  4:59 PM 
 
4. Enter the last four digits of your Social Security Number (xxxx) and Date of Birth (mm/dd/yyyy) as indicated and click Submit. You will be taken to the next sign-up page. 5. Create a [employer] ID. This will be your [employer] login ID and cannot be changed, so think of one that is secure and easy to remember. 6. Create a [employer] password. You can change your password at any time. 7. Enter your Password Reset Question and Answer. This can be used at a later time if you forget your password. 8. Enter your e-mail address. You will receive e-mail notification when new information is available in [address] 9. Click Sign Up. You can now view and download portions of your medical record. 10. Click the Download Summary menu link to download a portable copy of your medical information.  
 
If you have questions, please visit the Frequently Asked Questions section of the [employer]. Remember, [employer] is NOT to be used for urgent needs. For medical emergencies, dial 911. Now available from your iPhone and Android! Please provide this summary of care documentation to your next provider. Your primary care clinician is listed as Felix Marshall. If you have any questions after today's visit, please call [phone].

## 2017-05-18 DIAGNOSIS — J44.1 CHRONIC OBSTRUCTIVE PULMONARY DISEASE WITH ACUTE EXACERBATION (HCC): Primary | ICD-10-CM

## 2017-05-18 NOTE — PROGRESS NOTES
Verbal Order with read back per Dr. Angi Ramsay MD  For PFT smart panel. AMB POC PFT complete w/ bronchodilator  AMB POC PFT complete w/o bronchodilator    Dr. Angi Ramsay MD will co-sign the orders.

## 2017-08-08 ENCOUNTER — HOSPITAL ENCOUNTER (OUTPATIENT)
Dept: GENERAL RADIOLOGY | Age: 57
Discharge: HOME | End: 2017-08-08
Payer: MEDICARE

## 2017-08-08 DIAGNOSIS — J44.9 COPD (CHRONIC OBSTRUCTIVE PULMONARY DISEASE) (HCC): ICD-10-CM

## 2017-08-08 PROCEDURE — 71020 XR CHEST PA LAT: CPT

## 2017-08-08 RX ORDER — IPRATROPIUM BROMIDE 0.5 MG/2.5ML
SOLUTION RESPIRATORY (INHALATION)
Status: ON HOLD | COMMUNITY
Start: 2016-05-10 | End: 2018-03-15

## 2017-08-08 RX ORDER — LISINOPRIL 20 MG/1
20 TABLET ORAL 2 TIMES DAILY
Status: ON HOLD | COMMUNITY
End: 2018-03-13

## 2017-08-11 ENCOUNTER — ANESTHESIA EVENT (OUTPATIENT)
Dept: ENDOSCOPY | Age: 57
End: 2017-08-11
Payer: MEDICARE

## 2017-08-14 ENCOUNTER — ANESTHESIA (OUTPATIENT)
Dept: ENDOSCOPY | Age: 57
End: 2017-08-14
Payer: MEDICARE

## 2017-08-14 ENCOUNTER — HOSPITAL ENCOUNTER (OUTPATIENT)
Age: 57
Setting detail: OUTPATIENT SURGERY
Discharge: HOME | End: 2017-08-14
Attending: INTERNAL MEDICINE | Admitting: INTERNAL MEDICINE
Payer: MEDICARE

## 2017-08-14 VITALS
SYSTOLIC BLOOD PRESSURE: 154 MMHG | WEIGHT: 246 LBS | HEIGHT: 64 IN | RESPIRATION RATE: 16 BRPM | HEART RATE: 81 BPM | TEMPERATURE: 97.1 F | OXYGEN SATURATION: 98 % | DIASTOLIC BLOOD PRESSURE: 92 MMHG | BODY MASS INDEX: 42 KG/M2

## 2017-08-14 LAB
GLUCOSE BLD STRIP.AUTO-MCNC: 145 MG/DL (ref 70–110)
GLUCOSE BLD STRIP.AUTO-MCNC: 178 MG/DL (ref 70–110)

## 2017-08-14 PROCEDURE — 88305 TISSUE EXAM BY PATHOLOGIST: CPT | Performed by: INTERNAL MEDICINE

## 2017-08-14 PROCEDURE — 76040000019: Performed by: INTERNAL MEDICINE

## 2017-08-14 PROCEDURE — 74011000250 HC RX REV CODE- 250

## 2017-08-14 PROCEDURE — 74011250636 HC RX REV CODE- 250/636: Performed by: NURSE ANESTHETIST, CERTIFIED REGISTERED

## 2017-08-14 PROCEDURE — 77030018846 HC SOL IRR STRL H20 ICUM -A: Performed by: INTERNAL MEDICINE

## 2017-08-14 PROCEDURE — 74011000250 HC RX REV CODE- 250: Performed by: NURSE ANESTHETIST, CERTIFIED REGISTERED

## 2017-08-14 PROCEDURE — 74011250636 HC RX REV CODE- 250/636

## 2017-08-14 PROCEDURE — 77030019988 HC FCPS ENDOSC DISP BSC -B: Performed by: INTERNAL MEDICINE

## 2017-08-14 PROCEDURE — 82962 GLUCOSE BLOOD TEST: CPT

## 2017-08-14 PROCEDURE — 88342 IMHCHEM/IMCYTCHM 1ST ANTB: CPT | Performed by: INTERNAL MEDICINE

## 2017-08-14 PROCEDURE — 76060000031 HC ANESTHESIA FIRST 0.5 HR: Performed by: INTERNAL MEDICINE

## 2017-08-14 PROCEDURE — 77030008565 HC TBNG SUC IRR ERBE -B: Performed by: INTERNAL MEDICINE

## 2017-08-14 RX ORDER — DEXTROSE 50 % IN WATER (D50W) INTRAVENOUS SYRINGE
25-50 AS NEEDED
Status: DISCONTINUED | OUTPATIENT
Start: 2017-08-14 | End: 2017-08-14 | Stop reason: HOSPADM

## 2017-08-14 RX ORDER — NALOXONE HYDROCHLORIDE 0.4 MG/ML
0.1 INJECTION, SOLUTION INTRAMUSCULAR; INTRAVENOUS; SUBCUTANEOUS AS NEEDED
Status: DISCONTINUED | OUTPATIENT
Start: 2017-08-14 | End: 2017-08-14 | Stop reason: HOSPADM

## 2017-08-14 RX ORDER — MAGNESIUM SULFATE 100 %
4 CRYSTALS MISCELLANEOUS AS NEEDED
Status: DISCONTINUED | OUTPATIENT
Start: 2017-08-14 | End: 2017-08-14 | Stop reason: SDUPTHER

## 2017-08-14 RX ORDER — PROPOFOL 10 MG/ML
INJECTION, EMULSION INTRAVENOUS AS NEEDED
Status: DISCONTINUED | OUTPATIENT
Start: 2017-08-14 | End: 2017-08-14 | Stop reason: HOSPADM

## 2017-08-14 RX ORDER — GLYCOPYRROLATE 0.2 MG/ML
INJECTION INTRAMUSCULAR; INTRAVENOUS AS NEEDED
Status: DISCONTINUED | OUTPATIENT
Start: 2017-08-14 | End: 2017-08-14 | Stop reason: HOSPADM

## 2017-08-14 RX ORDER — SODIUM CHLORIDE 0.9 % (FLUSH) 0.9 %
5-10 SYRINGE (ML) INJECTION AS NEEDED
Status: DISCONTINUED | OUTPATIENT
Start: 2017-08-14 | End: 2017-08-14 | Stop reason: HOSPADM

## 2017-08-14 RX ORDER — SODIUM CHLORIDE 0.9 % (FLUSH) 0.9 %
5-10 SYRINGE (ML) INJECTION AS NEEDED
Status: DISCONTINUED | OUTPATIENT
Start: 2017-08-14 | End: 2017-08-14 | Stop reason: SDUPTHER

## 2017-08-14 RX ORDER — ONDANSETRON 2 MG/ML
4 INJECTION INTRAMUSCULAR; INTRAVENOUS
Status: DISCONTINUED | OUTPATIENT
Start: 2017-08-14 | End: 2017-08-14 | Stop reason: HOSPADM

## 2017-08-14 RX ORDER — DEXTROSE 50 % IN WATER (D50W) INTRAVENOUS SYRINGE
25-50 AS NEEDED
Status: DISCONTINUED | OUTPATIENT
Start: 2017-08-14 | End: 2017-08-14 | Stop reason: SDUPTHER

## 2017-08-14 RX ORDER — INSULIN LISPRO 100 [IU]/ML
INJECTION, SOLUTION INTRAVENOUS; SUBCUTANEOUS ONCE
Status: DISCONTINUED | OUTPATIENT
Start: 2017-08-14 | End: 2017-08-14 | Stop reason: HOSPADM

## 2017-08-14 RX ORDER — SODIUM CHLORIDE, SODIUM LACTATE, POTASSIUM CHLORIDE, CALCIUM CHLORIDE 600; 310; 30; 20 MG/100ML; MG/100ML; MG/100ML; MG/100ML
75 INJECTION, SOLUTION INTRAVENOUS CONTINUOUS
Status: DISCONTINUED | OUTPATIENT
Start: 2017-08-14 | End: 2017-08-14 | Stop reason: HOSPADM

## 2017-08-14 RX ORDER — SODIUM CHLORIDE 0.9 % (FLUSH) 0.9 %
5-10 SYRINGE (ML) INJECTION EVERY 8 HOURS
Status: DISCONTINUED | OUTPATIENT
Start: 2017-08-14 | End: 2017-08-14 | Stop reason: HOSPADM

## 2017-08-14 RX ORDER — SODIUM CHLORIDE, SODIUM LACTATE, POTASSIUM CHLORIDE, CALCIUM CHLORIDE 600; 310; 30; 20 MG/100ML; MG/100ML; MG/100ML; MG/100ML
100 INJECTION, SOLUTION INTRAVENOUS CONTINUOUS
Status: DISCONTINUED | OUTPATIENT
Start: 2017-08-14 | End: 2017-08-14 | Stop reason: HOSPADM

## 2017-08-14 RX ORDER — MAGNESIUM SULFATE 100 %
4 CRYSTALS MISCELLANEOUS AS NEEDED
Status: DISCONTINUED | OUTPATIENT
Start: 2017-08-14 | End: 2017-08-14 | Stop reason: HOSPADM

## 2017-08-14 RX ADMIN — PROPOFOL 40 MG: 10 INJECTION, EMULSION INTRAVENOUS at 13:09

## 2017-08-14 RX ADMIN — SODIUM CHLORIDE, SODIUM LACTATE, POTASSIUM CHLORIDE, AND CALCIUM CHLORIDE 75 ML/HR: 600; 310; 30; 20 INJECTION, SOLUTION INTRAVENOUS at 10:59

## 2017-08-14 RX ADMIN — PROPOFOL 20 MG: 10 INJECTION, EMULSION INTRAVENOUS at 13:08

## 2017-08-14 RX ADMIN — PROPOFOL 30 MG: 10 INJECTION, EMULSION INTRAVENOUS at 13:13

## 2017-08-14 RX ADMIN — GLYCOPYRROLATE 0.2 MG: 0.2 INJECTION INTRAMUSCULAR; INTRAVENOUS at 13:01

## 2017-08-14 RX ADMIN — SODIUM CHLORIDE 20 MG: 9 INJECTION INTRAMUSCULAR; INTRAVENOUS; SUBCUTANEOUS at 10:58

## 2017-08-14 RX ADMIN — PROPOFOL 60 MG: 10 INJECTION, EMULSION INTRAVENOUS at 13:07

## 2017-08-14 RX ADMIN — PROPOFOL 20 MG: 10 INJECTION, EMULSION INTRAVENOUS at 13:12

## 2017-08-14 NOTE — DISCHARGE INSTRUCTIONS
Upper GI Endoscopy: What to Expect at [address] [place] [address]  After you have an endoscopy, you will stay at the hospital or clinic for 1 to 2 hours. This will allow the medicine to wear off. You will be able to go home after your doctor or nurse checks to make sure you are not having any problems. You may have to stay overnight if you had treatment during the test. You may have a sore throat for a day or two after the test.  This care sheet gives you a general idea about what to expect after the test.  How can you care for yourself at home? Activity  · Rest as much as you need to after you go home. · You should be able to go back to your usual activities the day after the test.  Diet  · Follow your doctor's directions for eating after the test.  · Drink plenty of fluids (unless your doctor has told you not to). Medications  · If you have a sore throat the day after the test, use an over-the-counter spray to numb your throat. Follow-up care is a key part of your treatment and safety. Be sure to make and go to all appointments, and call your doctor if you are having problems. It's also a good idea to know your test results and keep a list of the medicines you take. When should you call for help? Call 911 anytime you think you may need emergency care. For example, call if:  · You passed out (lost consciousness). · You cough up blood. · You vomit blood or what looks like coffee grounds. · You pass maroon or very bloody stools. Call your doctor now or seek immediate medical care if:  · You have trouble swallowing. · You have belly pain. · Your stools are black and tarlike or have streaks of blood. · You are sick to your stomach or cannot keep fluids down. Watch closely for changes in your health, and be sure to contact your doctor if:  · Your throat still hurts after a day or two. · You do not get better as expected. Where can you learn more? Go to http://etelvina-olivier.info/.   Enter E896 in the search box to learn more about \"Upper GI Endoscopy: What to Expect at Home. \"  Current as of: August 9, 2016  Content Version: 11.3  © 7081-5204 [employer]. Care instructions adapted under license by [employer] (which disclaims liability or warranty for this information). If you have questions about a medical condition or this instruction, always ask your healthcare professional. [address] any warranty or liability for your use of this information. Boone's Esophagus: Care Instructions  Your Care Instructions    The esophagus is the tube that connects the throat to the stomach. Food and liquids go through this tube. In Boone's esophagus, the cells that line the tube change. This is usually because of gastroesophageal reflux disease (GERD). GERD causes acid from your stomach to back up into the esophagus. When you have Boone's esophagus, you are slightly more likely to get cancer of the esophagus. So regular testing is important to watch for signs of this cancer. You can treat GERD to control your symptoms and feel better. Follow-up care is a key part of your treatment and safety. Be sure to make and go to all appointments, and call your doctor if you are having problems. It's also a good idea to know your test results and keep a list of the medicines you take. How can you care for yourself at home? · Take your medicines exactly as prescribed. Call your doctor if you think you are having a problem with your medicine. · If you take over-the-counter medicine, such as antacids or acid reducers, follow all instructions on the label. If you use these medicines often, talk with your doctor. Be careful when you take over-the-counter antacid medicines. Many of these medicines have aspirin in them. Read the label to make sure that you are not taking more than the recommended dose. Too much aspirin can be harmful.   · Do not smoke or chew tobacco. Smoking can make GERD worse. If you need help quitting, talk to your doctor about stop-smoking programs and medicines. These can increase your chances of quitting for good. · Chocolate, mint, and alcohol can make GERD worse. They relax the valve between the esophagus and the stomach. · Spicy foods, foods that have a lot of acid (like tomatoes and oranges), and coffee can make GERD symptoms worse in some people. If your symptoms are worse after you eat a certain food, you may want to stop eating that food to see if your symptoms get better. · Eat smaller meals, and more often. After eating, wait 2 to 3 hours before you lie down. · Raise the head of your bed 6 to 8 inches by putting blocks under the frame or a foam wedge under the head of the mattress. · Do not wear tight clothing around your midsection. · If you are overweight, lose weight. Even losing 5 to 10 pounds can help. When should you call for help? Call 911 if you have symptoms of a heart attack. These may include:  · Chest pain or pressure. · Sweating. · Shortness of breath. · Nausea or vomiting. · Pain, pressure, or a strange feeling in the back, neck, jaw, or upper belly or in one or both shoulders or arms. · Lightheadedness or sudden weakness. · A fast or irregular heartbeat. After you call 911, the  may tell you to chew 1 adult-strength or 2 to 4 low-dose aspirin. Wait for an ambulance. Do not try to drive yourself. Call your doctor now or seek immediate medical care if:  · You have new or different belly pain. · Your stools are black and look like tar, or they have streaks of blood. Watch closely for changes in your health, and be sure to contact your doctor if:  · Your symptoms get worse or are not improving as expected. · You have any pain or difficulty swallowing. · Food seems to catch in your throat or chest.  Where can you learn more? Go to http://etelvina-olivier.info/.   Enter L146 in the search box to learn more about \"Boone's Esophagus: Care Instructions. \"  Current as of: November 16, 2016  Content Version: 11.3  © 9573-7221 [employer]. Care instructions adapted under license by [employer] (which disclaims liability or warranty for this information). If you have questions about a medical condition or this instruction, always ask your healthcare professional. [address] any warranty or liability for your use of this information. Hiatal Hernia: Care Instructions  Your Care Instructions  A hiatal hernia occurs when part of the stomach bulges into the chest cavity. A hiatal hernia may allow stomach acid and juices to back up into the esophagus (acid reflux). This can cause a feeling of burning, warmth, heat, or pain behind the breastbone. This feeling may often occur after you eat, soon after you lie down, or when you bend forward, and it may come and go. You also may have a sour taste in your mouth. These symptoms are commonly known as heartburn or reflux. But not all hiatal hernias cause symptoms. Follow-up care is a key part of your treatment and safety. Be sure to make and go to all appointments, and call your doctor if you are having problems. Its also a good idea to know your test results and keep a list of the medicines you take. How can you care for yourself at home? · Take your medicines exactly as prescribed. Call your doctor if you think you are having a problem with your medicine. · Do not take aspirin or other nonsteroidal anti-inflammatory drugs (NSAIDs), such as ibuprofen (Advil, Motrin) or naproxen (Aleve), unless your doctor says it is okay. Ask your doctor what you can take for pain. · Your doctor may recommend over-the-counter medicine. For mild or occasional indigestion, antacids such as Tums, Gaviscon, Maalox, or Mylanta may help.  Your doctor also may recommend over-the-counter acid reducers, such as famotidine (Pepcid AC), cimetidine (Tagamet HB), ranitidine (Zantac 75 and Zantac 150), or omeprazole (Prilosec). Read and follow all instructions on the label. If you use these medicines often, talk with your doctor. · Change your eating habits. ¨ Its best to eat several small meals instead of two or three large meals. ¨ After you eat, wait 2 to 3 hours before you lie down. Late-night snacks arent a good idea. ¨ Chocolate, mint, and alcohol can make heartburn worse. They relax the valve between the esophagus and the stomach. ¨ Spicy foods, foods that have a lot of acid (like tomatoes and oranges), and coffee can make heartburn symptoms worse in some people. If your symptoms are worse after you eat a certain food, you may want to stop eating that food to see if your symptoms get better. · Do not smoke or chew tobacco.  · If you get heartburn at night, raise the head of your bed 6 to 8 inches by putting the frame on blocks or placing a foam wedge under the head of your mattress. (Adding extra pillows does not work.)  · Do not wear tight clothing around your middle. · Lose weight if you need to. Losing just 5 to 10 pounds can help. When should you call for help? Call 911 anytime you think you may need emergency care. For example, call if:  · You have symptoms of a heart attack such as:  ¨ Chest pain or pressure. ¨ Sweating. ¨ Shortness of breath. ¨ Nausea or vomiting. ¨ Pain that spreads from the chest to the neck, jaw, or one or both shoulders or arms. ¨ Dizziness or lightheadedness. ¨ A fast or uneven pulse. After calling 911, chew 1 adult-strength aspirin. Wait for an ambulance. Do not try to drive yourself. · You vomit blood or what looks like coffee grounds. · You pass maroon or very bloody stools. Call your doctor now or seek immediate medical care if:  · You have new or different belly pain. · Your stools are black and tarlike or have streaks of blood.   · Food seems to catch in your throat or chest.  Watch closely for changes in your health, and be sure to contact your doctor if:  · Your symptoms get worse. · Your symptoms have not improved after 2 days. Where can you learn more? Go to http://etelvina-olivier.info/. Enter V142 in the search box to learn more about \"Hiatal Hernia: Care Instructions. \"  Current as of: August 9, 2016  Content Version: 11.3  © 1650-7502 [employer]. Care instructions adapted under license by [employer] (which disclaims liability or warranty for this information). If you have questions about a medical condition or this instruction, always ask your healthcare professional. [place] [address] any warranty or liability for your use of this information. DISCHARGE SUMMARY from Nurse    The following personal items are in your possession at time of discharge:    Dental Appliances: None  Visual Aid: None                  PATIENT INSTRUCTIONS:    After general anesthesia or intravenous sedation, for 24 hours or while taking prescription Narcotics:  · Limit your activities  · Do not drive and operate hazardous machinery  · Do not make important personal or business decisions  · Do  not drink alcoholic beverages  · If you have not urinated within 8 hours after discharge, please contact your surgeon on call. Report the following to your surgeon:  · Excessive pain, swelling, redness or odor of or around the surgical area  · Temperature over 100.5  · Nausea and vomiting lasting longer than 4 hours or if unable to take medications  · Any signs of decreased circulation or nerve impairment to extremity: change in color, persistent  numbness, tingling, coldness or increase pain  · Any questions      *  Please give a list of your current medications to your Primary Care Provider. *  Please update this list whenever your medications are discontinued, doses are      changed, or new medications (including over-the-counter products) are added.     *  Please carry medication information at all times in case of emergency situations. These are general instructions for a healthy lifestyle:    No smoking/ No tobacco products/ Avoid exposure to second hand smoke    Surgeon General's Warning:  Quitting smoking now greatly reduces serious risk to your health. Obesity, smoking, and sedentary lifestyle greatly increases your risk for illness    A healthy diet, regular physical exercise & weight monitoring are important for maintaining a healthy lifestyle    You may be retaining fluid if you have a history of heart failure or if you experience any of the following symptoms:  Weight gain of 3 pounds or more overnight or 5 pounds in a week, increased swelling in our hands or feet or shortness of breath while lying flat in bed. Please call your doctor as soon as you notice any of these symptoms; do not wait until your next office visit. Recognize signs and symptoms of STROKE:    F-face looks uneven    A-arms unable to move or move unevenly    S-speech slurred or non-existent    T-time-call 911 as soon as signs and symptoms begin-DO NOT go       Back to bed or wait to see if you get better-TIME IS BRAIN. Warning Signs of HEART ATTACK     Call 911 if you have these symptoms:   Chest discomfort. Most heart attacks involve discomfort in the center of the chest that lasts more than a few minutes, or that goes away and comes back. It can feel like uncomfortable pressure, squeezing, fullness, or pain.  Discomfort in other areas of the upper body. Symptoms can include pain or discomfort in one or both arms, the back, neck, jaw, or stomach.  Shortness of breath with or without chest discomfort.  Other signs may include breaking out in a cold sweat, nausea, or lightheadedness. Don't wait more than five minutes to call 911 - MINUTES MATTER! Fast action can save your life. Calling 911 is almost always the fastest way to get lifesaving treatment.  Emergency Medical Services staff can begin treatment when they arrive -- up to an hour sooner than if someone gets to the hospital by car. The discharge information has been reviewed with the patient. The patient verbalized understanding. Discharge medications reviewed with the patient and appropriate educational materials and side effects teaching were provided.

## 2017-08-14 NOTE — H&P
[facility]    [email or web address]      Impression:   1.epig pain   STEPHANIE      Plan:     1. EGD      Chief Complaint: pain , stephanie      HPI:  Patrick Babb is a 61 y.o. female who is being seen on consult for the above. Poor response to Rx. See notes. Claire Oden PMH:   Past Medical History:   Diagnosis Date    Asthma     COPD     Cystocele, midline     Diabetes mellitus ([address]     GERD (gastroesophageal reflux disease)     Hidradenitis suppurativa     Hyperlipidemia     Hypertension     SHERRIE on CPAP     CPAP    Stress incontinence        PSH:   Past Surgical History:   Procedure Laterality Date    BREAST SURGERY PROCEDURE UNLISTED      Right breast benign tumor removal    HX APPENDECTOMY      HX CHOLECYSTECTOMY      HX DILATION AND CURETTAGE      Dysfunctional uterine bleeding, thought 2/2 fibroids    HX TUBAL LIGATION         Social HX:   Social History     Social History    Marital status: LEGALLY      Spouse name: N/A    Number of children: N/A    Years of education: N/A     Occupational History    Not on file.      Social History Main Topics    Smoking status: Former Smoker     Packs/day: 1.00     Years: 30.00     Types: Cigarettes     Quit date: 9/6/2006    Smokeless tobacco: Never Used      Comment: 1-1.5 packs per day    Alcohol use No    Drug use: No    Sexual activity: No     Other Topics Concern    Not on file     Social History Narrative       FHX:   Family History   Problem Relation Age of Onset    Hypertension Mother     Stroke Mother     Breast Cancer Mother      Bilateral mastectomies    Cancer Mother      ovarian and breast    Heart Failure Mother     Heart Attack Father      2011    Heart Surgery Father      CABG    Heart Failure Father     COPD Sister      Heavy smoker    Cancer Sister      ovarian    Heart Failure Sister     Lung Disease Sister     Asthma Child     Cancer Maternal Aunt pancreatic    Cancer Maternal Grandfather      stomach       Allergy:   Allergies   Allergen Reactions    Ancef [Cefazolin] Hives    Contrast Agent [Iodine] Anaphylaxis, Shortness of Breath and Swelling     Needs pre-medication for IV contrast with Benadryl, Solu-Medrol    Fish Containing Products Anaphylaxis     Pt states she had a severe allergic reaction at 9 y/o.  Metformin Other (comments)     Abdominal pain, diarrhea.  Codeine Other (comments)     Altered mental status       Home Medications:     Prescriptions Prior to Admission   Medication Sig    lisinopril (PRINIVIL, ZESTRIL) 20 mg tablet Take 20 mg by mouth two (2) times a day.  ipratropium (ATROVENT) 0.02 % nebulizer solution Take  by inhalation four (4) times daily as needed.  OXYGEN-AIR DELIVERY SYSTEMS 2 L by Nasal route continuous.  fluticasone-salmeterol (ADVAIR HFA) 230-21 mcg/actuation inhaler Take 2 Puffs by inhalation two (2) times a day.  predniSONE (DELTASONE) 10 mg tablet Take 40 mg for 2 days, then take 20 mg for 3 days, and then take 10 mg 3 days (Patient taking differently: 40 mg daily.)    insulin glargine (LANTUS) 100 unit/mL injection 10 Units by SubCUTAneous route nightly. Indications: type 2 diabetes mellitus    [place]abetic Supp[place] [address] [facility] [place] 1/2 inch 31 gauge - 1 injection daily    Diabetic Supplies, Misc[employer]. misc Diabetic syringes 1 mL - use one daily    Diabetic Supplies[place] [address] [facility] [place] strips - use three times daily    Diabetic Supplies, [address] [facility] [place] lancets - use three times daily    insulin lispro (HUMALOG) 100 unit/mL injection Check sugars three times a day before meals, if blood sugar is greater than 250 give yourself 8 units of Humalog before eating. Indications: type 2 diabetes mellitus    aspirin delayed-release 81 mg tablet Take 81 mg by mouth daily.  famotidine (PEPCID) 20 mg tablet Take 20 mg by mouth two (2) times daily as needed.     lansoprazole (PREVACID) 30 mg capsule Take 30 mg by mouth two (2) times a day.  guaiFENesin ER (MUCINEX) 600 mg ER tablet Take 600 mg by mouth two (2) times daily as needed for Congestion.  triamcinolone acetonide (KENALOG) 0.1 % ointment Apply  to affected area two (2) times daily as needed for Skin Irritation. use thin layer    albuterol (PROVENTIL HFA, VENTOLIN HFA, PROAIR HFA) 90 mcg/actuation inhaler Take 2 Puffs by inhalation every four (4) hours as needed for Wheezing. For the next 2 days after hospital discharge, use your inhaler 4 times a day.  albuterol (PROVENTIL VENTOLIN) 2.5 mg /3 mL (0.083 %) nebulizer solution 2.5 mg by Nebulization route every four (4) hours as needed for Wheezing.  Inhalational Spacing Device (AEROCHAMBER) 1 Each by Does Not Apply route as needed for Cough or Other (COPD exacerbation).  fluticasone (FLONASE) 50 mcg/actuation nasal spray 2 Sprays by Both Nostrils route daily.  montelukast (SINGULAIR) 10 mg tablet Take 10 mg by mouth nightly.  tiotropium (SPIRIVA WITH HANDIHALER) 18 mcg inhalation capsule Take 1 Cap by inhalation daily. Review of Systems:     Constitutional: No fevers, chills, weight loss, fatigue. Skin: No rashes, pruritis, jaundice, ulcerations, erythema. HENT: No headaches, nosebleeds, sinus pressure, rhinorrhea, sore throat. Eyes: No visual changes, blurred vision, eye pain, photophobia, jaundice. Cardiovascular: No chest pain, heart palpitations. Respiratory: No cough, SOB, wheezing, chest discomfort, orthopnea. Gastrointestinal: Pain , stephanie   Genitourinary: No dysuria, bleeding, discharge, pyuria. Musculoskeletal: No weakness, arthralgias, wasting. Endo: No sweats. Heme: No bruising, easy bleeding. Allergies: As noted. Neurological: Cranial nerves intact. Alert and oriented. Gait not assessed. Psychiatric:  No anxiety, depression, hallucinations.                  Visit Vitals    /83    Pulse 76    Temp 97.3 °F (36.3 °C)    Resp 20    Ht 5' 4\" (1.626 m)    Wt 111.6 kg (246 lb)    SpO2 95%    Breastfeeding No    BMI 42.23 kg/m2       Physical Assessment:     constitutional: appearance: well developed, well nourished, normal habitus, no deformities, in no acute distress. skin: inspection: no rashes, ulcers, icterus or other lesions; no clubbing or telangiectasias. palpation: no induration or subcutaneos nodules. eyes: inspection: normal conjunctivae and lids; no jaundice pupils: symmetrical, normoreactive to light, normal accommodation and size. ENMT: mouth: normal oral mucosa,lips and gums; good dentition. oropharynx: normal tongue, hard and soft palate; posterior pharynx without erithema, exudate or lesions. neck: thyroid: normal size, consistency and position; no masses or tenderness. respiratory: effort: normal chest excursion; no intercostal retraction or accessory muscle use. cardiovascular: abdominal aorta: normal size and position; no bruits. palpation: PMI of normal size and position; normal rhythm; no thrill or murmurs. abdominal: abdomen: normal consistency; no tenderness or masses. hernias: no hernias appreciated. liver: normal size and consistency. spleen: not palpable. rectal: hemoccult/guaiac: not performed. musculoskeletal: digits and nails: no clubbing, cyanosis, petechiae or other inflammatory conditions. gait: normal gait and station head and neck: normal range of motion; no pain, crepitation or contracture. spine/ribs/pelvis: normal range of motion; no pain, deformity or contracture. lymphatic: axilae: not palpable. groin: not palpable. neck: within normal limits. other: not palpable. neurologic: cranial nerves: II-XII normal.   psychiatric: judgement/insight: within normal limits. memory: within normal limits for recent and remote events. mood and affect: no evidence of depression, anxiety or agitation. orientation: oriented to time, space and person.         Basic Metabolic Profile   No results for input(s): NA, K, CL, CO2, BUN, GLU, CA, MG, PHOS in the last 72 hours. No lab exists for component: CREAT      CBC w/Diff    No results for input(s): WBC, RBC, HGB, HCT, MCV, MCH, MCHC, RDW, PLT, HGBEXT, HCTEXT, PLTEXT in the last 72 hours. No lab exists for component: MPV No results for input(s): GRANS, LYMPH, EOS, PRO, MYELO, METAS, BLAST in the last 72 hours. No lab exists for component: MONO, BASO     Hepatic Function   No results for input(s): ALB, TP, TBILI, GPT, SGOT, AP, AML, LPSE in the last 72 hours. No lab exists for component: Brittney Godinez MD, M.D. [facility], [address] [place] [address]  [email or web address]. [facility]

## 2017-08-14 NOTE — IP AVS SNAPSHOT
[place] 
 
 
 [address] [facility] Patient: Janna Guzman MRN: [id number] IHD:[id number] You are allergic to the following Allergen Reactions Ancef (Cefazolin) Hives Contrast Agent (Iodine) Anaphylaxis Shortness of Breath Swelling Needs pre-medication for IV contrast with Benadryl, Solu-Medrol Fish Containing Products Anaphylaxis Pt states she had a severe allergic reaction at 7 y/o. Metformin Other (comments) Abdominal pain, diarrhea. Codeine Other (comments) Altered mental status Recent Documentation Height Weight Breastfeeding? BMI OB Status Smoking Status 1.626 m 111.6 kg No 42.23 kg/m2 Menopause Former Smoker Emergency Contacts Name Discharge Info Relation Home Work Mobile Caryn Curran  Daughter [21] [phone] Alexandre Griggs  Sister [23] [phone] Anne Going  Child [2] [phone] About your hospitalization You were admitted on:  August 14, 2017 You last received care in the:  [facility] 2 RECOVERY You were discharged on:  August 14, 2017 Unit phone number:  [phone] Why you were hospitalized Your primary diagnosis was:  Not on File Providers Seen During Your Hospitalizations Provider Role Specialty Primary office phone Janay Yancey MD Attending Provider Gastroenterology [phone] Your Primary Care Physician (PCP) Primary Care Physician Office Phone Office Fax Linsey Rosales [phone] Follow-up Information Follow up With Details Comments Contact Info Collette Sicks, MD   [address] [facility] [address] [place] [address] 
[phone] Janay Yancey MD  8-12 week follow up [address] [place] [address] [address] [facility] 
[phone] Current Discharge Medication List  
  
 CONTINUE these medications which have CHANGED Dose & Instructions Dispensing Information Comments Morning Noon Evening Bedtime  
 predniSONE 10 mg tablet Commonly known as:  Rossi Forte What changed:   
- how much to take - when to take this 
- additional instructions Your last dose was: Your next dose is: Take 40 mg for 2 days, then take 20 mg for 3 days, and then take 10 mg 3 days Quantity:  17 Tab Refills:  0 CONTINUE these medications which have NOT CHANGED Dose & Instructions Dispensing Information Comments Morning Noon Evening Bedtime Sean Avila 901-75 mcg/actuation inhaler Generic drug:  fluticasone-salmeterol Your last dose was: Your next dose is:    
   
   
 Dose:  2 Puff Take 2 Puffs by inhalation two (2) times a day. Refills:  0  
     
   
   
   
  
 * albuterol 2.5 mg /3 mL (0.083 %) nebulizer solution Commonly known as:  PROVENTIL VENTOLIN Your last dose was: Your next dose is:    
   
   
 Dose:  2.5 mg  
2.5 mg by Nebulization route every four (4) hours as needed for Wheezing. Refills:  0  
     
   
   
   
  
 * albuterol 90 mcg/actuation inhaler Commonly known as:  PROVENTIL HFA, VENTOLIN HFA, PROAIR HFA Your last dose was: Your next dose is:    
   
   
 Dose:  2 Puff Take 2 Puffs by inhalation every four (4) hours as needed for Wheezing. For the next 2 days after hospital discharge, use your inhaler 4 times a day. Quantity:  1 Inhaler Refills:  0  
     
   
   
   
  
 aspirin delayed-release 81 mg tablet Your last dose was: Your next dose is:    
   
   
 Dose:  81 mg Take 81 mg by mouth daily. Refills:  0  
     
   
   
   
  
 * Diabetic Supplies, Carrol Hernandez. Misc Your last dose was: Your next dose is:    
   
   
 Diabetic needles 1/2 inch 31 gauge - 1 injection daily Quantity:  30 Each Refills:  5 * Diabetic Supplies, [address] Misc Your last dose was: Your next dose is:    
   
   
 Diabetic syringes 1 mL - use one daily Quantity:  30 Each Refills:  5  
     
   
   
   
  
 * Diabetic Supplies, Miscellan. Misc Your last dose was: Your next dose is:    
   
   
 Diabetic test strips - use three times daily Quantity:  90 Each Refills:  5  
     
   
   
   
  
 * Diabetic Supplies, Miscellan. Misc Your last dose was: Your next dose is:    
   
   
 Diabetic lancets - use three times daily Quantity:  90 Each Refills:  5  
     
   
   
   
  
 famotidine 20 mg tablet Commonly known as:  PEPCID Your last dose was: Your next dose is:    
   
   
 Dose:  20 mg Take 20 mg by mouth two (2) times daily as needed. Refills:  0  
     
   
   
   
  
 FLONASE 50 mcg/actuation nasal spray Generic drug:  fluticasone Your last dose was: Your next dose is:    
   
   
 Dose:  2 Spray 2 Sprays by Both Nostrils route daily. Refills:  0  
     
   
   
   
  
 inhalational spacing device Commonly known as:  AEROCHAMBER Your last dose was: Your next dose is:    
   
   
 Dose:  1 Each  
1 Each by Does Not Apply route as needed for Cough or Other (COPD exacerbation). Quantity:  1 Device Refills:  0  
     
   
   
   
  
 insulin glargine 100 unit/mL injection Commonly known as:  LANTUS Your last dose was: Your next dose is:    
   
   
 Dose:  10 Units 10 Units by SubCUTAneous route nightly. Indications: type 2 diabetes mellitus Quantity:  1 Vial  
Refills:  2  
     
   
   
   
  
 insulin lispro 100 unit/mL injection Commonly known as:  HumaLOG Your last dose was: Your next dose is:    
   
   
 Check sugars three times a day before meals, if blood sugar is greater than 250 give yourself 8 units of Humalog before eating.   Indications: type 2 diabetes mellitus Quantity:  1 Vial  
Refills:  2  
     
   
   
   
  
 ipratropium 0.02 % nebulizer solution Commonly known as:  ATROVENT Your last dose was: Your next dose is: Take  by inhalation four (4) times daily as needed. Refills:  0  
     
   
   
   
  
 lansoprazole 30 mg capsule Commonly known as:  PREVACID Your last dose was: Your next dose is:    
   
   
 Dose:  30 mg Take 30 mg by mouth two (2) times a day. Refills:  0  
     
   
   
   
  
 lisinopril 20 mg tablet Commonly known as:  Julissa Coral Your last dose was: Your next dose is:    
   
   
 Dose:  20 mg Take 20 mg by mouth two (2) times a day. Refills:  0 MUCINEX 600 mg ER tablet Generic drug:  guaiFENesin ER Your last dose was: Your next dose is:    
   
   
 Dose:  600 mg Take 600 mg by mouth two (2) times daily as needed for Congestion. Refills:  0 OXYGEN-AIR DELIVERY SYSTEMS Your last dose was: Your next dose is:    
   
   
 Dose:  2 L  
2 L by Nasal route continuous. Refills:  0 SINGULAIR 10 mg tablet Generic drug:  montelukast  
   
Your last dose was: Your next dose is:    
   
   
 Dose:  10 mg Take 10 mg by mouth nightly. Refills:  0 SPIRIVA WITH HANDIHALER 18 mcg inhalation capsule Generic drug:  tiotropium Your last dose was: Your next dose is:    
   
   
 Dose:  1 Cap Take 1 Cap by inhalation daily. Refills:  0  
     
   
   
   
  
 triamcinolone acetonide 0.1 % ointment Commonly known as:  KENALOG Your last dose was: Your next dose is:    
   
   
 Apply  to affected area two (2) times daily as needed for Skin Irritation. use thin layer Refills:  0  
     
   
   
   
  
 * Notice:   This list has 6 medication(s) that are the same as other medications prescribed for you. Read the directions carefully, and ask your doctor or other care provider to review them with you. Discharge Instructions Upper GI Endoscopy: What to Expect at [facility] Your Recovery After you have an endoscopy, you will stay at the hospital or clinic for 1 to 2 hours. This will allow the medicine to wear off. You will be able to go home after your doctor or nurse checks to make sure you are not having any problems. You may have to stay overnight if you had treatment during the test. You may have a sore throat for a day or two after the test. 
This care sheet gives you a general idea about what to expect after the test. 
How can you care for yourself at home? Activity · Rest as much as you need to after you go home. · You should be able to go back to your usual activities the day after the test. 
Diet · Follow your doctor's directions for eating after the test. 
· Drink plenty of fluids (unless your doctor has told you not to). Medications · If you have a sore throat the day after the test, use an over-the-counter spray to numb your throat. Follow-up care is a key part of your treatment and safety. Be sure to make and go to all appointments, and call your doctor if you are having problems. It's also a good idea to know your test results and keep a list of the medicines you take. When should you call for help? Call 911 anytime you think you may need emergency care. For example, call if: 
· You passed out (lost consciousness). · You cough up blood. · You vomit blood or what looks like coffee grounds. · You pass maroon or very bloody stools. Call your doctor now or seek immediate medical care if: 
· You have trouble swallowing. · You have belly pain. · Your stools are black and tarlike or have streaks of blood. · You are sick to your stomach or cannot keep fluids down. Watch closely for changes in your health, and be sure to contact your doctor if: · Your throat still hurts after a day or two. · You do not get better as expected. Where can you learn more? Go to http://etelvina-olivier.info/. Enter [phone] in the search box to learn more about \"Upper GI Endoscopy: What to Expect at Home. \" Current as of: August 9, 2016 Content Version: 11.3 © 9749-4023 [employer]. Care instructions adapted under license by [employer] (which disclaims liability or warranty for this information). If you have questions about a medical condition or this instruction, always ask your healthcare professional. [place] [address] any warranty or liability for your use of this information. Boone's Esophagus: Care Instructions Your Care Instructions The esophagus is the tube that connects the throat to the stomach. Food and liquids go through this tube. In Boone's esophagus, the cells that line the tube change. This is usually because of gastroesophageal reflux disease (GERD). GERD causes acid from your stomach to back up into the esophagus. When you have Boone's esophagus, you are slightly more likely to get cancer of the esophagus. So regular testing is important to watch for signs of this cancer. You can treat GERD to control your symptoms and feel better. Follow-up care is a key part of your treatment and safety. Be sure to make and go to all appointments, and call your doctor if you are having problems. It's also a good idea to know your test results and keep a list of the medicines you take. How can you care for yourself at home? · Take your medicines exactly as prescribed. Call your doctor if you think you are having a problem with your medicine. · If you take over-the-counter medicine, such as antacids or acid reducers, follow all instructions on the label. If you use these medicines often, talk with your doctor.  Be careful when you take over-the-counter antacid medicines. Many of these medicines have aspirin in them. Read the label to make sure that you are not taking more than the recommended dose. Too much aspirin can be harmful. · Do not smoke or chew tobacco. Smoking can make GERD worse. If you need help quitting, talk to your doctor about stop-smoking programs and medicines. These can increase your chances of quitting for good. · Chocolate, mint, and alcohol can make GERD worse. They relax the valve between the esophagus and the stomach. · Spicy foods, foods that have a lot of acid (like tomatoes and oranges), and coffee can make GERD symptoms worse in some people. If your symptoms are worse after you eat a certain food, you may want to stop eating that food to see if your symptoms get better. · Eat smaller meals, and more often. After eating, wait 2 to 3 hours before you lie down. · Raise the head of your bed 6 to 8 inches by putting blocks under the frame or a foam wedge under the head of the mattress. · Do not wear tight clothing around your midsection. · If you are overweight, lose weight. Even losing 5 to 10 pounds can help. When should you call for help? Call 911 if you have symptoms of a heart attack. These may include: · Chest pain or pressure. · Sweating. · Shortness of breath. · Nausea or vomiting. · Pain, pressure, or a strange feeling in the back, neck, jaw, or upper belly or in one or both shoulders or arms. · Lightheadedness or sudden weakness. · A fast or irregular heartbeat. After you call 911, the  may tell you to chew 1 adult-strength or 2 to 4 low-dose aspirin. Wait for an ambulance. Do not try to drive yourself. Call your doctor now or seek immediate medical care if: 
· You have new or different belly pain. · Your stools are black and look like tar, or they have streaks of blood. Watch closely for changes in your health, and be sure to contact your doctor if: · Your symptoms get worse or are not improving as expected. · You have any pain or difficulty swallowing. · Food seems to catch in your throat or chest. 
Where can you learn more? Go to http://etelvina-olivier.info/. Enter L146 in the search box to learn more about \"Boone's Esophagus: Care Instructions. \" Current as of: November 16, 2016 Content Version: 11.3 © 4987-1597 [employer]. Care instructions adapted under license by [employer] (which disclaims liability or warranty for this information). If you have questions about a medical condition or this instruction, always ask your healthcare professional. [place] [address] any warranty or liability for your use of this information. Hiatal Hernia: Care Instructions Your Care Instructions A hiatal hernia occurs when part of the stomach bulges into the chest cavity. A hiatal hernia may allow stomach acid and juices to back up into the esophagus (acid reflux). This can cause a feeling of burning, warmth, heat, or pain behind the breastbone. This feeling may often occur after you eat, soon after you lie down, or when you bend forward, and it may come and go. You also may have a sour taste in your mouth. These symptoms are commonly known as heartburn or reflux. But not all hiatal hernias cause symptoms. Follow-up care is a key part of your treatment and safety. Be sure to make and go to all appointments, and call your doctor if you are having problems. Its also a good idea to know your test results and keep a list of the medicines you take. How can you care for yourself at home? · Take your medicines exactly as prescribed. Call your doctor if you think you are having a problem with your medicine.  
· Do not take aspirin or other nonsteroidal anti-inflammatory drugs (NSAIDs), such as ibuprofen (Advil, Motrin) or naproxen (Aleve), unless your doctor says it is okay. Ask your doctor what you can take for pain. · Your doctor may recommend over-the-counter medicine. For mild or occasional indigestion, antacids such as Tums, Gaviscon, Maalox, or Mylanta may help. Your doctor also may recommend over-the-counter acid reducers, such as famotidine (Pepcid AC), cimetidine (Tagamet HB), ranitidine (Zantac 75 and Zantac 150), or omeprazole (Prilosec). Read and follow all instructions on the label. If you use these medicines often, talk with your doctor. · Change your eating habits. ¨ Its best to eat several small meals instead of two or three large meals. ¨ After you eat, wait 2 to 3 hours before you lie down. Late-night snacks arent a good idea. ¨ Chocolate, mint, and alcohol can make heartburn worse. They relax the valve between the esophagus and the stomach. ¨ Spicy foods, foods that have a lot of acid (like tomatoes and oranges), and coffee can make heartburn symptoms worse in some people. If your symptoms are worse after you eat a certain food, you may want to stop eating that food to see if your symptoms get better. · Do not smoke or chew tobacco. 
· If you get heartburn at night, raise the head of your bed 6 to 8 inches by putting the frame on blocks or placing a foam wedge under the head of your mattress. (Adding extra pillows does not work.) · Do not wear tight clothing around your middle. · Lose weight if you need to. Losing just 5 to 10 pounds can help. When should you call for help? Call 911 anytime you think you may need emergency care. For example, call if: 
· You have symptoms of a heart attack such as: ¨ Chest pain or pressure. ¨ Sweating. ¨ Shortness of breath. ¨ Nausea or vomiting. ¨ Pain that spreads from the chest to the neck, jaw, or one or both shoulders or arms. ¨ Dizziness or lightheadedness. ¨ A fast or uneven pulse. After calling 911, chew 1 adult-strength aspirin. Wait for an ambulance. Do not try to drive yourself. · You vomit blood or what looks like coffee grounds. · You pass maroon or very bloody stools. Call your doctor now or seek immediate medical care if: 
· You have new or different belly pain. · Your stools are black and tarlike or have streaks of blood. · Food seems to catch in your throat or chest. 
Watch closely for changes in your health, and be sure to contact your doctor if: 
· Your symptoms get worse. · Your symptoms have not improved after 2 days. Where can you learn more? Go to http://etelvina-olivier.info/. Enter A411 in the search box to learn more about \"Hiatal Hernia: Care Instructions. \" Current as of: August 9, 2016 Content Version: 11.3 © 3023-4148 [employer]. Care instructions adapted under license by [employer] (which disclaims liability or warranty for this information). If you have questions about a medical condition or this instruction, always ask your healthcare professional. [place] [address] any warranty or liability for your use of this information. DISCHARGE SUMMARY from Nurse The following personal items are in your possession at time of discharge: 
 
Dental Appliances: None Visual Aid: None PATIENT INSTRUCTIONS: 
 
 
F-face looks uneven A-arms unable to move or move unevenly S-speech slurred or non-existent T-time-call 911 as soon as signs and symptoms begin-DO NOT go Back to bed or wait to see if you get better-TIME IS BRAIN. Warning Signs of HEART ATTACK Call 911 if you have these symptoms: 
? Chest discomfort. Most heart attacks involve discomfort in the center of the chest that lasts more than a few minutes, or that goes away and comes back. It can feel like uncomfortable pressure, squeezing, fullness, or pain. ? Discomfort in other areas of the upper body.  Symptoms can include pain or discomfort in one or both arms, the back, neck, jaw, or stomach. ? Shortness of breath with or without chest discomfort. ? Other signs may include breaking out in a cold sweat, nausea, or lightheadedness. Don't wait more than five minutes to call [address]! Fast action can save your life. Calling 911 is almost always the fastest way to get lifesaving treatment. Emergency Medical Services staff can begin treatment when they arrive  up to an hour sooner than if someone gets to the hospital by car. The discharge information has been reviewed with the patient. The patient verbalized understanding. Discharge medications reviewed with the patient and appropriate educational materials and side effects teaching were provided. Discharge Orders None Introducing [facility]! [address] introduces [employer] patient portal. Now you can access parts of your medical record, email your doctor's office, and request medication refills online. 1. In your internet browser, go to [email or web address] 2. Click on the First Time User? Click Here link in the Sign In box. You will see the New Member Sign Up page. 3. Enter your [employer] Access Code exactly as it appears below. You will not need to use this code after youve completed the sign-up process. If you do not sign up before the expiration date, you must request a new code. · [employer] Access Code: [email or web address] Expires: 11/12/2017  2:20 PM 
 
4. Enter the last four digits of your Social Security Number (xxxx) and Date of Birth (mm/dd/yyyy) as indicated and click Submit. You will be taken to the next sign-up page. 5. Create a [employer] ID. This will be your [employer] login ID and cannot be changed, so think of one that is secure and easy to remember. 6. Create a [employer] password. You can change your password at any time. 7. Enter your Password Reset Question and Answer.  This can be used at a later time if you forget your password. 8. Enter your e-mail address. You will receive e-mail notification when new information is available in [address] 9. Click Sign Up. You can now view and download portions of your medical record. 10. Click the Download Summary menu link to download a portable copy of your medical information. If you have questions, please visit the Frequently Asked Questions section of the [employer] website. Remember, [employer] is NOT to be used for urgent needs. For medical emergencies, dial 911. Now available from your iPhone and Android! General Information Please provide this summary of care documentation to your next provider. Patient Signature:  ____________________________________________________________ Date:  ____________________________________________________________  
  
Larri Hazard Provider Signature:  ____________________________________________________________ Date:  ____________________________________________________________

## 2018-01-12 ENCOUNTER — HOSPITAL ENCOUNTER (EMERGENCY)
Age: 58
Discharge: HOME | End: 2018-01-12
Attending: EMERGENCY MEDICINE
Payer: MEDICARE

## 2018-01-12 ENCOUNTER — APPOINTMENT (OUTPATIENT)
Dept: GENERAL RADIOLOGY | Age: 58
End: 2018-01-12
Attending: EMERGENCY MEDICINE
Payer: MEDICARE

## 2018-01-12 VITALS
HEART RATE: 87 BPM | DIASTOLIC BLOOD PRESSURE: 61 MMHG | RESPIRATION RATE: 16 BRPM | TEMPERATURE: 97.7 F | OXYGEN SATURATION: 96 % | SYSTOLIC BLOOD PRESSURE: 126 MMHG

## 2018-01-12 DIAGNOSIS — J44.1 COPD EXACERBATION (HCC): Primary | ICD-10-CM

## 2018-01-12 LAB
ALBUMIN SERPL-MCNC: 3.6 G/DL (ref 3.4–5)
ALBUMIN/GLOB SERPL: 0.9 {RATIO} (ref 0.8–1.7)
ALP SERPL-CCNC: 86 U/L (ref 45–117)
ALT SERPL-CCNC: 51 U/L (ref 13–56)
ANION GAP SERPL CALC-SCNC: 7 MMOL/L (ref 3–18)
AST SERPL-CCNC: 31 U/L (ref 15–37)
BASOPHILS # BLD: 0 K/UL (ref 0–0.06)
BASOPHILS NFR BLD: 0 % (ref 0–2)
BILIRUB SERPL-MCNC: 0.5 MG/DL (ref 0.2–1)
BNP SERPL-MCNC: 80 PG/ML (ref 0–900)
BUN SERPL-MCNC: 10 MG/DL (ref 7–18)
BUN/CREAT SERPL: 12 (ref 12–20)
CALCIUM SERPL-MCNC: 8.8 MG/DL (ref 8.5–10.1)
CHLORIDE SERPL-SCNC: 102 MMOL/L (ref 100–108)
CK MB CFR SERPL CALC: 1.3 % (ref 0–4)
CK MB SERPL-MCNC: 1.2 NG/ML (ref 5–25)
CK SERPL-CCNC: 91 U/L (ref 26–192)
CO2 SERPL-SCNC: 28 MMOL/L (ref 21–32)
CREAT SERPL-MCNC: 0.86 MG/DL (ref 0.6–1.3)
D DIMER PPP FEU-MCNC: <0.27 UG/ML(FEU)
DIFFERENTIAL METHOD BLD: NORMAL
EOSINOPHIL # BLD: 0.1 K/UL (ref 0–0.4)
EOSINOPHIL NFR BLD: 1 % (ref 0–5)
ERYTHROCYTE [DISTWIDTH] IN BLOOD BY AUTOMATED COUNT: 13.6 % (ref 11.6–14.5)
GLOBULIN SER CALC-MCNC: 4.1 G/DL (ref 2–4)
GLUCOSE SERPL-MCNC: 186 MG/DL (ref 74–99)
HCT VFR BLD AUTO: 36.7 % (ref 35–45)
HGB BLD-MCNC: 12.7 G/DL (ref 12–16)
LACTATE BLD-SCNC: 2.5 MMOL/L (ref 0.4–2)
LYMPHOCYTES # BLD: 2.7 K/UL (ref 0.9–3.6)
LYMPHOCYTES NFR BLD: 36 % (ref 21–52)
MCH RBC QN AUTO: 30.2 PG (ref 24–34)
MCHC RBC AUTO-ENTMCNC: 34.6 G/DL (ref 31–37)
MCV RBC AUTO: 87.4 FL (ref 74–97)
MONOCYTES # BLD: 0.5 K/UL (ref 0.05–1.2)
MONOCYTES NFR BLD: 7 % (ref 3–10)
NEUTS SEG # BLD: 4.3 K/UL (ref 1.8–8)
NEUTS SEG NFR BLD: 56 % (ref 40–73)
PLATELET # BLD AUTO: 278 K/UL (ref 135–420)
PMV BLD AUTO: 9.3 FL (ref 9.2–11.8)
POTASSIUM SERPL-SCNC: 3.7 MMOL/L (ref 3.5–5.5)
PROT SERPL-MCNC: 7.7 G/DL (ref 6.4–8.2)
RBC # BLD AUTO: 4.2 M/UL (ref 4.2–5.3)
SODIUM SERPL-SCNC: 137 MMOL/L (ref 136–145)
TROPONIN I SERPL-MCNC: <0.02 NG/ML (ref 0–0.04)
WBC # BLD AUTO: 7.6 K/UL (ref 4.6–13.2)

## 2018-01-12 PROCEDURE — 83880 ASSAY OF NATRIURETIC PEPTIDE: CPT | Performed by: EMERGENCY MEDICINE

## 2018-01-12 PROCEDURE — 82550 ASSAY OF CK (CPK): CPT | Performed by: EMERGENCY MEDICINE

## 2018-01-12 PROCEDURE — 80053 COMPREHEN METABOLIC PANEL: CPT | Performed by: EMERGENCY MEDICINE

## 2018-01-12 PROCEDURE — 83605 ASSAY OF LACTIC ACID: CPT

## 2018-01-12 PROCEDURE — 94640 AIRWAY INHALATION TREATMENT: CPT

## 2018-01-12 PROCEDURE — 71045 X-RAY EXAM CHEST 1 VIEW: CPT

## 2018-01-12 PROCEDURE — 77030029684 HC NEB SM VOL KT MONA -A

## 2018-01-12 PROCEDURE — 99285 EMERGENCY DEPT VISIT HI MDM: CPT

## 2018-01-12 PROCEDURE — 74011250636 HC RX REV CODE- 250/636: Performed by: EMERGENCY MEDICINE

## 2018-01-12 PROCEDURE — 96374 THER/PROPH/DIAG INJ IV PUSH: CPT

## 2018-01-12 PROCEDURE — 85025 COMPLETE CBC W/AUTO DIFF WBC: CPT | Performed by: EMERGENCY MEDICINE

## 2018-01-12 PROCEDURE — 85379 FIBRIN DEGRADATION QUANT: CPT | Performed by: EMERGENCY MEDICINE

## 2018-01-12 PROCEDURE — 74011000250 HC RX REV CODE- 250: Performed by: EMERGENCY MEDICINE

## 2018-01-12 RX ORDER — IPRATROPIUM BROMIDE 0.5 MG/2.5ML
0.5 SOLUTION RESPIRATORY (INHALATION)
Status: COMPLETED | OUTPATIENT
Start: 2018-01-12 | End: 2018-01-12

## 2018-01-12 RX ORDER — PREDNISONE 50 MG/1
50 TABLET ORAL DAILY
Qty: 4 TAB | Refills: 0 | Status: SHIPPED | OUTPATIENT
Start: 2018-01-12 | End: 2018-01-16

## 2018-01-12 RX ORDER — FUROSEMIDE 10 MG/ML
20 INJECTION INTRAMUSCULAR; INTRAVENOUS
Status: COMPLETED | OUTPATIENT
Start: 2018-01-12 | End: 2018-01-12

## 2018-01-12 RX ORDER — ALBUTEROL SULFATE 0.83 MG/ML
5 SOLUTION RESPIRATORY (INHALATION)
Status: COMPLETED | OUTPATIENT
Start: 2018-01-12 | End: 2018-01-12

## 2018-01-12 RX ADMIN — ALBUTEROL SULFATE 5 MG: 2.5 SOLUTION RESPIRATORY (INHALATION) at 22:03

## 2018-01-12 RX ADMIN — IPRATROPIUM BROMIDE 0.5 MG: 0.5 SOLUTION RESPIRATORY (INHALATION) at 21:13

## 2018-01-12 RX ADMIN — FUROSEMIDE 20 MG: 10 INJECTION, SOLUTION INTRAMUSCULAR; INTRAVENOUS at 22:03

## 2018-01-12 NOTE — ED PROVIDER NOTES
EMERGENCY DEPARTMENT HISTORY AND PHYSICAL EXAM    6:48 PM      Date: 1/12/2018  Patient Name: Doron Ortiz    History of Presenting Illness     Chief Complaint   Patient presents with    Shortness of Breath         History Provided By: Patient    Chief Complaint: SOB  Duration:  Days  Timing:  Worsening  Location: Respiratory  Quality: \"Suffocating\"  Modifying Factors: SOB exacerbates with physical exertion   Associated Symptoms: CP and elevated BP      Additional History (Context):    Doron Ortiz is a 61 y.o. female with a pertinent history of COPD, asthma, CHF, SHERRIE on CPAP, HTN, HLD, DM, GERD presents to the ED c/o worsening, \"suffocating,\" SOB x 3 days. Sx worsens with physical exertion such as ambulating to the bathroom more than usual. Sx is dissimilar from her COPD exacerbation or asthma attacks in the past. Also reports CP (\"feels like somebody is standing on my chest and at times, kicking me\") and elevated BP. Pt denies fever, cough. No h/o DVT or PE. States she has not been evaluated by her pulmonologist, Dr. Saintclair Beer, in awhile. No other acute symptoms or complaints were noted. PCP: Tommy Kim MD    Current Outpatient Prescriptions   Medication Sig Dispense Refill    predniSONE (DELTASONE) 50 mg tablet Take 1 Tab by mouth daily for 4 days. 4 Tab 0    lisinopril (PRINIVIL, ZESTRIL) 20 mg tablet Take 20 mg by mouth two (2) times a day.  ipratropium (ATROVENT) 0.02 % nebulizer solution Take  by inhalation four (4) times daily as needed.  OXYGEN-AIR DELIVERY SYSTEMS 2 L by Nasal route continuous.  fluticasone-salmeterol (ADVAIR HFA) 230-21 mcg/actuation inhaler Take 2 Puffs by inhalation two (2) times a day.  insulin glargine (LANTUS) 100 unit/mL injection 10 Units by SubCUTAneous route nightly. Indications: type 2 diabetes mellitus 1 Vial 2    Diabetic Supplies, [employer].  misc Diabetic needles 1/2 inch 31 gauge - 1 injection daily 30 Each 5    Diabetic Supplies, [address] [facility] Diabetic syringes 1 mL - use one daily 30 Each 5    Diabetic Supplies, Miscellan. misc Diabetic test strips - use three times daily 90 Each 5    Diabetic Supplies, [employer]isc[employer]. misc Diabetic lancets - use three times daily 90 Each 5    insulin lispro (HUMALOG) 100 unit/mL injection Check sugars three times a day before meals, if blood sugar is greater than 250 give yourself 8 units of Humalog before eating. Indications: type 2 diabetes mellitus 1 Vial 2    aspirin delayed-release 81 mg tablet Take 81 mg by mouth daily.  famotidine (PEPCID) 20 mg tablet Take 20 mg by mouth two (2) times daily as needed.  lansoprazole (PREVACID) 30 mg capsule Take 30 mg by mouth two (2) times a day.  guaiFENesin ER (MUCINEX) 600 mg ER tablet Take 600 mg by mouth two (2) times daily as needed for Congestion.  triamcinolone acetonide (KENALOG) 0.1 % ointment Apply  to affected area two (2) times daily as needed for Skin Irritation. use thin layer      albuterol (PROVENTIL HFA, VENTOLIN HFA, PROAIR HFA) 90 mcg/actuation inhaler Take 2 Puffs by inhalation every four (4) hours as needed for Wheezing. For the next 2 days after hospital discharge, use your inhaler 4 times a day. 1 Inhaler 0    tiotropium (SPIRIVA WITH HANDIHALER) 18 mcg inhalation capsule Take 1 Cap by inhalation daily.  albuterol (PROVENTIL VENTOLIN) 2.5 mg /3 mL (0.083 %) nebulizer solution 2.5 mg by Nebulization route every four (4) hours as needed for Wheezing.  Inhalational Spacing Device (AEROCHAMBER) 1 Each by Does Not Apply route as needed for Cough or Other (COPD exacerbation). 1 Device 0    fluticasone (FLONASE) 50 mcg/actuation nasal spray 2 Sprays by Both Nostrils route daily.  montelukast (SINGULAIR) 10 mg tablet Take 10 mg by mouth nightly.          Past History     Past Medical History:  Past Medical History:   Diagnosis Date    Asthma     COPD     Cystocele, midline     Diabetes mellitus ([facility] [address]     GERD (gastroesophageal reflux disease)     Hidradenitis suppurativa     Hyperlipidemia     Hypertension     SHERRIE on CPAP     CPAP    Stress incontinence        Past Surgical History:  Past Surgical History:   Procedure Laterality Date    BREAST SURGERY PROCEDURE UNLISTED      Right breast benign tumor removal    HX APPENDECTOMY      HX CHOLECYSTECTOMY      HX DILATION AND CURETTAGE      Dysfunctional uterine bleeding, thought 2/2 fibroids    HX TUBAL LIGATION         Family History:  Family History   Problem Relation Age of Onset    Hypertension Mother     Stroke Mother     Breast Cancer Mother      Bilateral mastectomies    Cancer Mother      ovarian and breast    Heart Failure Mother     Heart Attack Father      2011    Heart Surgery Father      CABG    Heart Failure Father     COPD Sister      Heavy smoker    Cancer Sister      ovarian    Heart Failure Sister     Lung Disease Sister     Asthma Child     Cancer Maternal Aunt      pancreatic    Cancer Maternal Grandfather      stomach       Social History:  Social History   Substance Use Topics    Smoking status: Former Smoker     Packs/day: 1.00     Years: 30.00     Types: Cigarettes     Quit date: 9/6/2006    Smokeless tobacco: Never Used      Comment: 1-1.5 packs per day    Alcohol use No       Allergies: Allergies   Allergen Reactions    Ancef [Cefazolin] Hives    Contrast Agent [Iodine] Anaphylaxis, Shortness of Breath and Swelling     Needs pre-medication for IV contrast with Benadryl, Solu-Medrol    Fish Containing Products Anaphylaxis     Pt states she had a severe allergic reaction at 9 y/o.  Metformin Other (comments)     Abdominal pain, diarrhea.  Codeine Other (comments)     Altered mental status         Review of Systems       Review of Systems   Constitutional: Negative for chills and fever. Respiratory: Positive for shortness of breath. Negative for cough.     Cardiovascular: Positive for chest pain. Gastrointestinal: Negative for diarrhea, nausea and vomiting. All other systems reviewed and are negative. Physical Exam     Visit Vitals    /61 (BP 1 Location: Right arm, BP Patient Position: At rest)    Pulse 87    Temp 97.7 °F (36.5 °C)    Resp 16    SpO2 96%         Physical Exam   Constitutional: She is oriented to person, place, and time. She appears well-developed and well-nourished. Halting speech    HENT:   Head: Normocephalic and atraumatic. Eyes: Conjunctivae and EOM are normal. Right eye exhibits no discharge. Left eye exhibits no discharge. No scleral icterus. Neck: Normal range of motion. Neck supple. No tracheal deviation present. Cardiovascular: Normal rate, regular rhythm and normal heart sounds. No murmur heard. Pulmonary/Chest: Breath sounds normal. She is in respiratory distress (moderate ). She has no wheezes. She has no rales. Halting speech    Abdominal: Soft. She exhibits no distension. There is no tenderness. There is no rebound and no guarding. Musculoskeletal: Normal range of motion. She exhibits no edema or deformity. Neurological: She is alert and oriented to person, place, and time. No cranial nerve deficit. Skin: Skin is warm and dry. She is not diaphoretic. Psychiatric: She has a normal mood and affect. Her behavior is normal. Judgment and thought content normal.         Diagnostic Study Results     Labs -  No results found for this or any previous visit (from the past 12 hour(s)). Radiologic Studies -   XR CHEST SNGL V   Final Result            Medical Decision Making   I am the first provider for this patient. I reviewed the vital signs, available nursing notes, past medical history, past surgical history, family history and social history. Vital Signs-Reviewed the patient's vital signs.       Records Reviewed: Nursing Notes and Old Medical Records (Time of Review: 6:48 PM)      Provider Notes (Medical Decision Making): Pt improved dramatically with treatment. Will give course of steroids and have pt f/u with pulmonologist for gradually worsening copd. Diagnosis     Clinical Impression:   1. COPD exacerbation ([address]        Disposition: home    Follow-up Information     Follow up With Details Comments [address], [place]. Alphonse Jeffrey  [facility] [address]  [phone]      [facility] EMERGENCY DEPT   [address] [address] [phone]           Discharge Medication List as of 1/12/2018  9:27 PM      CONTINUE these medications which have CHANGED    Details   predniSONE (DELTASONE) 50 mg tablet Take 1 Tab by mouth daily for 4 days. , Print, Disp-4 Tab, R-0         CONTINUE these medications which have NOT CHANGED    Details   lisinopril (PRINIVIL, ZESTRIL) 20 mg tablet Take 20 mg by mouth two (2) times a day., Historical Med      ipratropium (ATROVENT) 0.02 % nebulizer solution Take  by inhalation four (4) times daily as needed., Historical Med      OXYGEN-AIR DELIVERY SYSTEMS 2 L by Nasal route continuous. , Historical Med      fluticasone-salmeterol (ADVAIR HFA) 230-21 mcg/actuation inhaler Take 2 Puffs by inhalation two (2) times a day., Historical Med      insulin glargine (LANTUS) 100 unit/mL injection 10 Units by SubCUTAneous route nightly. Indications: type 2 diabetes mellitus, Print, Disp-1 Vial, R-2      !! Diabetic Supplies, [address] [facility] Diabetic needles 1/2 inch 31 gauge - 1 injection daily, Print, Disp-30 Each, R-5      !! Diabetic Supplies, [address] [facility] Diabetic syringes 1 mL - use one daily, Print, Disp-30 Each, R-5      !! Diabetic Supplies, [address] [facility] Diabetic test strips - use three times daily, Print, Disp-90 Each, R-5      !! Diabetic Supplies, [address]  [facility] Diabetic lancets - use three times daily, Print, Disp-90 Each, R-5      insulin lispro (HUMALOG) 100 unit/mL injection Check sugars three times a day before meals, if blood sugar is greater than 250 give yourself 8 units of Humalog before eating. Indications: type 2 diabetes mellitus, Print, Disp-1 Vial, R-2      aspirin delayed-release 81 mg tablet Take 81 mg by mouth daily. , Historical Med      famotidine (PEPCID) 20 mg tablet Take 20 mg by mouth two (2) times daily as needed., Historical Med      lansoprazole (PREVACID) 30 mg capsule Take 30 mg by mouth two (2) times a day., Historical Med      guaiFENesin ER (MUCINEX) 600 mg ER tablet Take 600 mg by mouth two (2) times daily as needed for Congestion. , Historical Med      triamcinolone acetonide (KENALOG) 0.1 % ointment Apply  to affected area two (2) times daily as needed for Skin Irritation. use thin layer, Historical Med      albuterol (PROVENTIL HFA, VENTOLIN HFA, PROAIR HFA) 90 mcg/actuation inhaler Take 2 Puffs by inhalation every four (4) hours as needed for Wheezing. For the next 2 days after hospital discharge, use your inhaler 4 times a day., Print, Disp-1 Inhaler, R-0      tiotropium (SPIRIVA WITH HANDIHALER) 18 mcg inhalation capsule Take 1 Cap by inhalation daily. , Historical Med      albuterol (PROVENTIL VENTOLIN) 2.5 mg /3 mL (0.083 %) nebulizer solution 2.5 mg by Nebulization route every four (4) hours as needed for Wheezing., Historical Med      Inhalational Spacing Device (AEROCHAMBER) 1 Each by Does Not Apply route as needed for Cough or Other (COPD exacerbation). , Normal, Disp-1 Device, R-0      fluticasone (FLONASE) 50 mcg/actuation nasal spray 2 Sprays by Both Nostrils route daily. , Historical Med      montelukast (SINGULAIR) 10 mg tablet Take 10 mg by mouth nightly., Historical Med       !! - Potential duplicate medications found.  Please discuss with provider.        _______________________________    Attestations:  Scribe Attestation     Grace Castillo acting as a scribe for and in the presence of Dorothy Newman MD    January 12, 2018 at 6:48 PM       Provider Attestation:      I personally performed the services described in the documentation, reviewed the documentation, as recorded by the scribe in my presence, and it accurately and completely records my words and actions.  January 12, 2018 at 6:48 PM - Cassie Halsted, MD    _______________________________

## 2018-01-12 NOTE — ED TRIAGE NOTES
Pt arrived via EMS for sob x2 days that has increasingly worsened, pt was given 1 duoneb, 1 albuterol, 125mg solumedrol and 4mg zofran. Pt is currently 95% RA and 97% 2L, pt wears 2L continously at home, ambulated independently from EMS stretcher to ED bed and from ED bed to restroom without incident, a&ox4, c/o midsternal chest pain r/t cough, hx of copd and possible chf, htn and type 2 diabetes, gave herself 8 units regular insulin prior to EMS arrival for BG of 283.

## 2018-01-13 NOTE — ED NOTES
Bedside report given to Rhina Nicolas, [address]. Pt resting comfortably at this time, safety intact.

## 2018-01-13 NOTE — ED NOTES
Assumed care of patient from [address]. Pt is on stretcher, in street clothes, on monitor. Pt is awaiting lab results. Pt has IV access. Will continue to assess.

## 2018-01-13 NOTE — ED NOTES
Reviewed DC instructions with patient. Pt verbalized an understanding. Pt instructed to follow up with PCP this week. Pt was sent home with 1 prescription. Pt signed paper Dc instructions; RN placed in scan bin. RN used wheelchair to take patient to waiting room.  Pt was on 2L of NC.

## 2018-01-23 ENCOUNTER — HOSPITAL ENCOUNTER (INPATIENT)
Age: 58
LOS: 3 days | Discharge: HOME | DRG: 191 | End: 2018-01-26
Attending: EMERGENCY MEDICINE | Admitting: FAMILY MEDICINE
Payer: MEDICARE

## 2018-01-23 ENCOUNTER — APPOINTMENT (OUTPATIENT)
Dept: GENERAL RADIOLOGY | Age: 58
DRG: 191 | End: 2018-01-23
Attending: PHYSICIAN ASSISTANT
Payer: MEDICARE

## 2018-01-23 DIAGNOSIS — R06.03 ACUTE RESPIRATORY DISTRESS: ICD-10-CM

## 2018-01-23 DIAGNOSIS — J20.9 ACUTE BRONCHITIS, UNSPECIFIED ORGANISM: ICD-10-CM

## 2018-01-23 DIAGNOSIS — R06.09 DYSPNEA ON EXERTION: ICD-10-CM

## 2018-01-23 DIAGNOSIS — J44.1 COPD WITH ACUTE EXACERBATION (HCC): Primary | ICD-10-CM

## 2018-01-23 PROBLEM — J44.0 COPD WITH ACUTE BRONCHITIS (HCC): Status: ACTIVE | Noted: 2018-01-23

## 2018-01-23 LAB
ALBUMIN SERPL-MCNC: 3.6 G/DL (ref 3.4–5)
ALBUMIN/GLOB SERPL: 0.8 {RATIO} (ref 0.8–1.7)
ALP SERPL-CCNC: 87 U/L (ref 45–117)
ALT SERPL-CCNC: 51 U/L (ref 13–56)
ANION GAP SERPL CALC-SCNC: 5 MMOL/L (ref 3–18)
AST SERPL-CCNC: 50 U/L (ref 15–37)
BASOPHILS # BLD: 0 K/UL (ref 0–0.1)
BASOPHILS NFR BLD: 0 % (ref 0–2)
BILIRUB SERPL-MCNC: 0.7 MG/DL (ref 0.2–1)
BNP SERPL-MCNC: 22 PG/ML (ref 0–900)
BUN SERPL-MCNC: 11 MG/DL (ref 7–18)
BUN/CREAT SERPL: 13 (ref 12–20)
CALCIUM SERPL-MCNC: 9.7 MG/DL (ref 8.5–10.1)
CHLORIDE SERPL-SCNC: 100 MMOL/L (ref 100–108)
CK MB CFR SERPL CALC: 0.8 % (ref 0–4)
CK MB SERPL-MCNC: 1 NG/ML (ref 5–25)
CK SERPL-CCNC: 128 U/L (ref 26–192)
CO2 SERPL-SCNC: 32 MMOL/L (ref 21–32)
CREAT SERPL-MCNC: 0.88 MG/DL (ref 0.6–1.3)
DIFFERENTIAL METHOD BLD: ABNORMAL
EOSINOPHIL # BLD: 0.1 K/UL (ref 0–0.4)
EOSINOPHIL NFR BLD: 1 % (ref 0–5)
ERYTHROCYTE [DISTWIDTH] IN BLOOD BY AUTOMATED COUNT: 13.6 % (ref 11.6–14.5)
FLUAV AG NPH QL IA: NEGATIVE
FLUBV AG NOSE QL IA: NEGATIVE
GLOBULIN SER CALC-MCNC: 4.6 G/DL (ref 2–4)
GLUCOSE SERPL-MCNC: 174 MG/DL (ref 74–99)
HCT VFR BLD AUTO: 39.3 % (ref 35–45)
HGB BLD-MCNC: 13.8 G/DL (ref 12–16)
LYMPHOCYTES # BLD: 2.1 K/UL (ref 0.9–3.6)
LYMPHOCYTES NFR BLD: 30 % (ref 21–52)
MCH RBC QN AUTO: 30.6 PG (ref 24–34)
MCHC RBC AUTO-ENTMCNC: 35.1 G/DL (ref 31–37)
MCV RBC AUTO: 87.1 FL (ref 74–97)
MONOCYTES # BLD: 0.4 K/UL (ref 0.05–1.2)
MONOCYTES NFR BLD: 6 % (ref 3–10)
NEUTS SEG # BLD: 4.4 K/UL (ref 1.8–8)
NEUTS SEG NFR BLD: 63 % (ref 40–73)
PLATELET # BLD AUTO: 282 K/UL (ref 135–420)
PMV BLD AUTO: 8.8 FL (ref 9.2–11.8)
POTASSIUM SERPL-SCNC: 4.5 MMOL/L (ref 3.5–5.5)
PROT SERPL-MCNC: 8.2 G/DL (ref 6.4–8.2)
RBC # BLD AUTO: 4.51 M/UL (ref 4.2–5.3)
SODIUM SERPL-SCNC: 137 MMOL/L (ref 136–145)
TROPONIN I SERPL-MCNC: <0.02 NG/ML (ref 0–0.04)
WBC # BLD AUTO: 7.1 K/UL (ref 4.6–13.2)

## 2018-01-23 PROCEDURE — 65270000029 HC RM PRIVATE

## 2018-01-23 PROCEDURE — 74011000250 HC RX REV CODE- 250: Performed by: EMERGENCY MEDICINE

## 2018-01-23 PROCEDURE — 85025 COMPLETE CBC W/AUTO DIFF WBC: CPT | Performed by: PHYSICIAN ASSISTANT

## 2018-01-23 PROCEDURE — 74011000250 HC RX REV CODE- 250: Performed by: PHYSICIAN ASSISTANT

## 2018-01-23 PROCEDURE — 93005 ELECTROCARDIOGRAM TRACING: CPT

## 2018-01-23 PROCEDURE — 96375 TX/PRO/DX INJ NEW DRUG ADDON: CPT

## 2018-01-23 PROCEDURE — 96365 THER/PROPH/DIAG IV INF INIT: CPT

## 2018-01-23 PROCEDURE — 83880 ASSAY OF NATRIURETIC PEPTIDE: CPT | Performed by: PHYSICIAN ASSISTANT

## 2018-01-23 PROCEDURE — 99285 EMERGENCY DEPT VISIT HI MDM: CPT

## 2018-01-23 PROCEDURE — 94640 AIRWAY INHALATION TREATMENT: CPT

## 2018-01-23 PROCEDURE — 71046 X-RAY EXAM CHEST 2 VIEWS: CPT

## 2018-01-23 PROCEDURE — 87804 INFLUENZA ASSAY W/OPTIC: CPT | Performed by: EMERGENCY MEDICINE

## 2018-01-23 PROCEDURE — 80053 COMPREHEN METABOLIC PANEL: CPT | Performed by: PHYSICIAN ASSISTANT

## 2018-01-23 PROCEDURE — 74011250636 HC RX REV CODE- 250/636: Performed by: PHYSICIAN ASSISTANT

## 2018-01-23 PROCEDURE — 94761 N-INVAS EAR/PLS OXIMETRY MLT: CPT

## 2018-01-23 PROCEDURE — 74011250636 HC RX REV CODE- 250/636: Performed by: EMERGENCY MEDICINE

## 2018-01-23 PROCEDURE — 82550 ASSAY OF CK (CPK): CPT | Performed by: PHYSICIAN ASSISTANT

## 2018-01-23 PROCEDURE — 77030029684 HC NEB SM VOL KT MONA -A

## 2018-01-23 PROCEDURE — 87040 BLOOD CULTURE FOR BACTERIA: CPT | Performed by: EMERGENCY MEDICINE

## 2018-01-23 RX ORDER — ACETAMINOPHEN 325 MG/1
650 TABLET ORAL
Status: DISCONTINUED | OUTPATIENT
Start: 2018-01-23 | End: 2018-01-26 | Stop reason: HOSPADM

## 2018-01-23 RX ORDER — LEVOFLOXACIN 5 MG/ML
750 INJECTION, SOLUTION INTRAVENOUS EVERY 24 HOURS
Status: DISCONTINUED | OUTPATIENT
Start: 2018-01-23 | End: 2018-01-24

## 2018-01-23 RX ORDER — ENOXAPARIN SODIUM 100 MG/ML
40 INJECTION SUBCUTANEOUS EVERY 24 HOURS
Status: DISCONTINUED | OUTPATIENT
Start: 2018-01-23 | End: 2018-01-26 | Stop reason: HOSPADM

## 2018-01-23 RX ORDER — MAGNESIUM SULFATE HEPTAHYDRATE 40 MG/ML
2 INJECTION, SOLUTION INTRAVENOUS
Status: COMPLETED | OUTPATIENT
Start: 2018-01-23 | End: 2018-01-23

## 2018-01-23 RX ORDER — IPRATROPIUM BROMIDE AND ALBUTEROL SULFATE 2.5; .5 MG/3ML; MG/3ML
3 SOLUTION RESPIRATORY (INHALATION) ONCE
Status: COMPLETED | OUTPATIENT
Start: 2018-01-23 | End: 2018-01-23

## 2018-01-23 RX ORDER — ONDANSETRON 2 MG/ML
4 INJECTION INTRAMUSCULAR; INTRAVENOUS
Status: COMPLETED | OUTPATIENT
Start: 2018-01-23 | End: 2018-01-23

## 2018-01-23 RX ORDER — IPRATROPIUM BROMIDE AND ALBUTEROL SULFATE 2.5; .5 MG/3ML; MG/3ML
3 SOLUTION RESPIRATORY (INHALATION)
Status: COMPLETED | OUTPATIENT
Start: 2018-01-23 | End: 2018-01-23

## 2018-01-23 RX ORDER — SODIUM CHLORIDE 0.9 % (FLUSH) 0.9 %
5-10 SYRINGE (ML) INJECTION AS NEEDED
Status: DISCONTINUED | OUTPATIENT
Start: 2018-01-23 | End: 2018-01-26 | Stop reason: HOSPADM

## 2018-01-23 RX ADMIN — METHYLPREDNISOLONE SODIUM SUCCINATE 125 MG: 125 INJECTION, POWDER, FOR SOLUTION INTRAMUSCULAR; INTRAVENOUS at 19:31

## 2018-01-23 RX ADMIN — IPRATROPIUM BROMIDE AND ALBUTEROL SULFATE 3 ML: .5; 3 SOLUTION RESPIRATORY (INHALATION) at 19:35

## 2018-01-23 RX ADMIN — LEVOFLOXACIN 750 MG: 5 INJECTION, SOLUTION INTRAVENOUS at 22:31

## 2018-01-23 RX ADMIN — IPRATROPIUM BROMIDE AND ALBUTEROL SULFATE 3 ML: .5; 3 SOLUTION RESPIRATORY (INHALATION) at 19:42

## 2018-01-23 RX ADMIN — MAGNESIUM SULFATE IN WATER 2 G: 40 INJECTION, SOLUTION INTRAVENOUS at 19:31

## 2018-01-23 RX ADMIN — ONDANSETRON 4 MG: 2 INJECTION INTRAMUSCULAR; INTRAVENOUS at 19:31

## 2018-01-23 RX ADMIN — IPRATROPIUM BROMIDE AND ALBUTEROL SULFATE 3 ML: .5; 3 SOLUTION RESPIRATORY (INHALATION) at 19:31

## 2018-01-23 NOTE — ED TRIAGE NOTES
Coughing, shortness of breath, wheezing. Finished prednisone , not sleeping well. Cannot lay down, unable to wear cpap. Is coughing too muich.  2-3 wood dyspnea

## 2018-01-23 NOTE — IP AVS SNAPSHOT
[address] [address] Saurabh French Patient: Dalila Vazquez MRN: [id number] SAULO:[id number] About your hospitalization You were admitted on:  January 23, 2018 You last received care in the:  [address] [place] [address] You were discharged on:  January 26, 2018 Why you were hospitalized Your primary diagnosis was:  Copd With Acute Exacerbation (Hcc) Your diagnoses also included:  Dyspnea, Copd With Acute Bronchitis (Hcc), Essential Hypertension, Benign, Esophageal Reflux, Byron On Cpap, Diabetes Mellitus, Type 2 (Hcc), Diastolic Chf, Chronic (Hcc), Hyperlipidemia Follow-up Information Follow up With Details Comments Contact Info Enrique Yao MD On 1/31/2018 at 10:00AM [address] [facility] [address] [facility] [address] 
[phone] Luis Douglas MD On 2/6/2018 at 10:45AM [address] [facility] [address] [address] 
[phone] Your Scheduled Appointments Tuesday February 06, 2018 10:45 AM EST Follow Up with Luis Douglas MD  
[address] ([address] [facility]) [address] [place] [address], [address] [address]  
[phone] Discharge Orders Procedure Order Date Status Priority Quantity Spec Type Associated Dx CALL YOUR DOCTOR For: Other 01/26/18 0933 Normal Routine 1 Questions: For:  Other ACTIVITY AFTER DISCHARGE Patient should: Resume activity as tolerated. 01/26/18 0933 Normal Routine 1 Questions: Patient should:  Resume activity as tolerated. DIET CARDIAC No options chosen 01/26/18 0933 Normal Routine 1 Questions: Additional options:  No options chosen METABOLIC PANEL, BASIC 03/18/67 1205 Future Routine 1 -   
 CBC WITH AUTOMATED DIFF 01/26/18 1205 Future Routine 1 Blood A check cole indicates which time of day the medication should be taken. My Medications START taking these medications Instructions Each Dose to Equal  
 Morning Noon Evening Bedtime  
 ergocalciferol 50,000 unit capsule Commonly known as:  ERGOCALCIFEROL Start taking on:  2/1/2018 Take 1 Cap by mouth every seven (7) days for 30 days. Indications: Vitamin D Deficiency 67771 Units  
    
   
   
   
  
 levoFLOXacin 500 mg tablet Commonly known as:  Mason Soren Take 1 Tab by mouth daily for 3 days. 500 mg  
    
   
   
   
  
 * predniSONE 10 mg tablet Commonly known as:  Kayli Lard Take 4 tablets daily for 3 days, take 3 tablets daily for 3 days and 2 tablets daily for 5 days. * predniSONE 20 mg tablet Commonly known as:  Kayli Lard Take 3 tablets for 3 days, take 2 tablets for 3 days, take 1 tablet for 3 days * Notice: This list has 2 medication(s) that are the same as other medications prescribed for you. Read the directions carefully, and ask your doctor or other care provider to review them with you. CHANGE how you take these medications Instructions Each Dose to Equal  
 Morning Noon Evening Bedtime  
 insulin glargine 100 unit/mL injection Commonly known as:  LANTUS What changed:  how much to take 20 Units by SubCUTAneous route nightly. Indications: type 2 diabetes mellitus 20 Units CONTINUE taking these medications Instructions Each Dose to Equal  
 Morning Noon Evening Bedtime Sean Avila 957-64 mcg/actuation inhaler Generic drug:  fluticasone-salmeterol Take 2 Puffs by inhalation two (2) times a day. 2 Puff * albuterol 2.5 mg /3 mL (0.083 %) nebulizer solution Commonly known as:  PROVENTIL VENTOLIN  
   
 2.5 mg by Nebulization route every four (4) hours as needed for Wheezing. 2.5 mg  
    
   
   
   
  
 * albuterol 90 mcg/actuation inhaler Commonly known as:  PROVENTIL HFA, VENTOLIN HFA, PROAIR HFA Take 2 Puffs by inhalation every four (4) hours as needed for Wheezing. For the next 2 days after hospital discharge, use your inhaler 4 times a day. 2 Puff  
    
   
   
   
  
 aspirin delayed-release 81 mg tablet Take 81 mg by mouth daily. 81 mg  
    
  
   
   
   
  
 * Diabetic Supplies, [address] [facility] Diabetic needles 1/2 inch 31 gauge - 1 injection daily * Diabetic Supplies, [address] [facility] Diabetic syringes 1 mL - use one daily * Diabetic Supplies, [address] [facility] Diabetic test strips - use three times daily * Diabetic Supplies, [address] [facility] Diabetic lancets - use three times daily  
     
   
   
   
  
 famotidine 20 mg tablet Commonly known as:  PEPCID Take 20 mg by mouth two (2) times daily as needed. 20 mg  
    
  
   
   
   
  
  
 FLONASE 50 mcg/actuation nasal spray Generic drug:  fluticasone 2 Sprays by Both Nostrils route daily. 2 Spray  
    
  
   
   
   
  
 inhalational spacing device Commonly known as:  AEROCHAMBER  
   
 1 Each by Does Not Apply route as needed for Cough or Other (COPD exacerbation). 1 Each  
    
   
   
   
  
 insulin lispro 100 unit/mL injection Commonly known as:  HumaLOG Check sugars three times a day before meals, if blood sugar is greater than 250 give yourself 8 units of Humalog before eating. Indications: type 2 diabetes mellitus  
     
  
   
  
   
  
   
  
 ipratropium 0.02 % Soln Commonly known as:  ATROVENT Take  by inhalation four (4) times daily as needed. lisinopril 20 mg tablet Commonly known as:  Adriana Cockayne Take 20 mg by mouth two (2) times a day. 20 mg  
    
  
   
   
   
  
 MUCINEX 600 mg ER tablet Generic drug:  guaiFENesin ER  
   
 Take 600 mg by mouth two (2) times daily as needed for Congestion. 600 mg  
    
  
   
   
   
  
  
 neomycin-polymyxin-hc 3.5-10,000-10 mg-unit-mg/mL ophthalmic suspension Commonly known as:  CORTISPORIN Administer 1 Drop to both eyes four (4) times daily as needed. 1 Drop OXYGEN-AIR DELIVERY SYSTEMS  
   
 2 L by Nasal route continuous. 2 L  
    
   
   
   
  
 pantoprazole 40 mg tablet Commonly known as:  PROTONIX Take 40 mg by mouth daily. 40 mg  
    
  
   
   
   
  
 SINGULAIR 10 mg tablet Generic drug:  montelukast  
   
 Take 10 mg by mouth nightly. 10 mg  
    
   
   
   
  
  
 * Notice: This list has 6 medication(s) that are the same as other medications prescribed for you. Read the directions carefully, and ask your doctor or other care provider to review them with you. Where to Get Your Medications Information on where to get these meds will be given to you by the nurse or doctor. ! Ask your nurse or doctor about these medications  
  ergocalciferol 50,000 unit capsule  
 insulin glargine 100 unit/mL injection  
 levoFLOXacin 500 mg tablet  
 predniSONE 10 mg tablet  
 predniSONE 20 mg tablet Discharge Instructions Bronchitis: Care Instructions Your Care Instructions Bronchitis is inflammation of the bronchial tubes, which carry air to the lungs. The tubes swell and produce mucus, or phlegm. The mucus and inflamed bronchial tubes make you cough. You may have trouble breathing. Most cases of bronchitis are caused by viruses like those that cause colds. Antibiotics usually do not help and they may be harmful. Bronchitis usually develops rapidly and lasts about 2 to 3 weeks in otherwise healthy people. Follow-up care is a key part of your treatment and safety.  Be sure to make and go to all appointments, and call your doctor if you are having problems. It's also a good idea to know your test results and keep a list of the medicines you take. How can you care for yourself at home? · Take all medicines exactly as prescribed. Call your doctor if you think you are having a problem with your medicine. · Get some extra rest. 
· Take an over-the-counter pain medicine, such as acetaminophen (Tylenol), ibuprofen (Advil, Motrin), or naproxen (Aleve) to reduce fever and relieve body aches. Read and follow all instructions on the label. · Do not take two or more pain medicines at the same time unless the doctor told you to. Many pain medicines have acetaminophen, which is Tylenol. Too much acetaminophen (Tylenol) can be harmful. · Take an over-the-counter cough medicine that contains dextromethorphan to help quiet a dry, hacking cough so that you can sleep. Avoid cough medicines that have more than one active ingredient. Read and follow all instructions on the label. · Breathe moist air from a humidifier, hot shower, or sink filled with hot water. The heat and moisture will thin mucus so you can cough it out. · Do not smoke. Smoking can make bronchitis worse. If you need help quitting, talk to your doctor about stop-smoking programs and medicines. These can increase your chances of quitting for good. When should you call for help? Call 911 anytime you think you may need emergency care. For example, call if: 
? · You have severe trouble breathing. ?Call your doctor now or seek immediate medical care if: 
? · You have new or worse trouble breathing. ? · You cough up dark brown or bloody mucus (sputum). ? · You have a new or higher fever. ? · You have a new rash. ? Watch closely for changes in your health, and be sure to contact your doctor if: 
? · You cough more deeply or more often, especially if you notice more mucus or a change in the color of your mucus. ? · You are not getting better as expected. Where can you learn more? Go to http://etelvina-olivier.info/. Enter H333 in the search box to learn more about \"Bronchitis: Care Instructions. \" Current as of: May 12, 2017 Content Version: 11.4 © 0020-5220 [employer]VS Intel[employer]ream. Care instructions adapted under license by [employer] (which disclaims liability or warranty for this information). If you have questions about a medical condition or this instruction, always ask your healthcare professional. [address] [address] any warranty or liability for your use of this information. DISCHARGE SUMMARY from Nurse PATIENT INSTRUCTIONS: 
 
 
F-face looks uneven A-arms unable to move or move unevenly S-speech slurred or non-existent T-time-call 911 as soon as signs and symptoms begin-DO NOT go Back to bed or wait to see if you get better-TIME IS BRAIN. Warning Signs of HEART ATTACK Call 911 if you have these symptoms: 
? Chest discomfort. Most heart attacks involve discomfort in the center of the chest that lasts more than a few minutes, or that goes away and comes back. It can feel like uncomfortable pressure, squeezing, fullness, or pain. ? Discomfort in other areas of the upper body. Symptoms can include pain or discomfort in one or both arms, the back, neck, jaw, or stomach. ? Shortness of breath with or without chest discomfort. ? Other signs may include breaking out in a cold sweat, nausea, or lightheadedness. Don't wait more than five minutes to call [address]! Fast action can save your life.  Calling 911 is almost always the fastest way to get lifesaving treatment. Emergency Medical Services staff can begin treatment when they arrive  up to an hour sooner than if someone gets to the hospital by car. The discharge information has been reviewed with the patient. The patient verbalized understanding. Discharge medications reviewed with the patient and appropriate educational materials and side effects teaching were provided. MyChart Activation Thank you for requesting access to [email or web address]. Please follow the instructions below to securely access and download your online medical record. [email or web address] allows you to send messages to your doctor, view your test results, renew your prescriptions, schedule appointments, and more. How Do I Sign Up? 1. In your internet browser, go to [email or web address]. 2. Click on the First Time User? Click Here link in the Sign In box. You will see the New Member Sign Up page. 3. Enter your Dropost.it Access Code exactly as it appears below. You will not need to use this code after youve completed the sign-up process. If you do not sign up before the expiration date, you must request a new code. [email or web address] Access Code: [email or web address] Expires: 2018  9:27 PM (This is the date your [email or web address] access code will ) 4. Enter the last four digits of your Social Security Number (xxxx) and Date of Birth (mm/dd/yyyy) as indicated and click Submit. You will be taken to the next sign-up page. 5. Create a Dropost.it ID. This will be your Dropost.it login ID and cannot be changed, so think of one that is secure and easy to remember. 6. Create a Dropost.it password. You can change your password at any time. 7. Enter your Password Reset Question and Answer. This can be used at a later time if you forget your password. 8. Enter your e-mail address. You will receive e-mail notification when new information is available in [address] 9. Click Sign Up.  You can now view and download portions of your medical record. 10. Click the Download Summary menu link to download a portable copy of your medical information. Additional Information If you have questions, please visit the Frequently Asked Questions section of the PolySuite website at [email or web address] Remember, PolySuite is NOT to be used for urgent needs. For medical emergencies, dial 911. Patient armband removed and shredded 
 
___________________________________________________________________________________________________________________________________ Introducing [facility]! Ludin Hassan introduces PolySuite patient portal. Now you can access parts of your medical record, email your doctor's office, and request medication refills online. 1. In your internet browser, go to [email or web address] 2. Click on the First Time User? Click Here link in the Sign In box. You will see the New Member Sign Up page. 3. Enter your PolySuite Access Code exactly as it appears below. You will not need to use this code after youve completed the sign-up process. If you do not sign up before the expiration date, you must request a new code. · PolySuite Access Code: [email or web address] Expires: 4/12/2018  9:27 PM 
 
4. Enter the last four digits of your Social Security Number (xxxx) and Date of Birth (mm/dd/yyyy) as indicated and click Submit. You will be taken to the next sign-up page. 5. Create a PolySuite ID. This will be your PolySuite login ID and cannot be changed, so think of one that is secure and easy to remember. 6. Create a PolySuite password. You can change your password at any time. 7. Enter your Password Reset Question and Answer. This can be used at a later time if you forget your password. 8. Enter your e-mail address. You will receive e-mail notification when new information is available in [address] 9. Click Sign Up. You can now view and download portions of your medical record. 10. Click the Download Summary menu link to download a portable copy of your medical information. If you have questions, please visit the Frequently Asked Questions section of the MyChart website. Remember, Fotoliahart is NOT to be used for urgent needs. For medical emergencies, dial 911. Now available from your iPhone and Android! Unresulted Labs-Please follow up with your PCP about these lab tests Order Current Status CULTURE, BLOOD Preliminary result CULTURE, BLOOD Preliminary result Providers Seen During Your Hospitalization Provider Specialty Primary office phone Se Dockery MD Emergency Medicine [phone] Shady Minor MD Family Practice [phone] Rusty Traore MD Internal Medicine [phone] Immunizations Administered for This Admission Name Date Influenza Vaccine (Quad) PF 1/26/2018 Your Primary Care Physician (PCP) Primary Care Physician Office Phone Office Fax Sherron Macias [phone] You are allergic to the following Allergen Reactions Ancef (Cefazolin) Hives Contrast Agent (Iodine) Anaphylaxis Shortness of Breath Swelling Needs pre-medication for IV contrast with Benadryl, Solu-Medrol Fish Containing Products Anaphylaxis Pt states she had a severe allergic reaction at 9 y/o. Metformin Other (comments) Abdominal pain, diarrhea. Codeine Other (comments) Altered mental status Recent Documentation Height Weight Breastfeeding? BMI OB Status Smoking Status 1.6 m 113.9 kg No 44.5 kg/m2 Menopause Former Smoker Emergency Contacts Name Discharge Info Relation Home Work Mobile Caryn Curran DISCHARGE CAREGIVER [3] Daughter [21] [phone] Sommer Rodriguez DISCHARGE CAREGIVER [3] Sister [23] [phone] Patient Belongings  The following personal items are in your possession at time of discharge: Dental Appliances: None  Visual Aid: None      Home Medications: None   Jewelry: None  Clothing: Pants, Shirt, Slippers, Undergarments    Other Valuables:  (tablet and ) Discharge Instructions Attachments/References COPD (ENGLISH) Patient Handouts Chronic Obstructive Pulmonary Disease (COPD): Care Instructions Your Care Instructions Chronic obstructive pulmonary disease (COPD) is a general term for a group of lung diseases, including emphysema and chronic bronchitis. People with COPD have decreased airflow in and out of the lungs, which makes it hard to breathe. The airways also can get clogged with thick mucus. Cigarette smoking is a major cause of COPD. Although there is no cure for COPD, you can slow its progress. Following your treatment plan and taking care of yourself can help you feel better and live longer. Follow-up care is a key part of your treatment and safety. Be sure to make and go to all appointments, and call your doctor if you are having problems. It's also a good idea to know your test results and keep a list of the medicines you take. How can you care for yourself at home? ?Staying healthy ? · Do not smoke. This is the most important step you can take to prevent more damage to your lungs. If you need help quitting, talk to your doctor about stop-smoking programs and medicines. These can increase your chances of quitting for good. ? · Avoid colds and flu. Get a pneumococcal vaccine shot. If you have had one before, ask your doctor whether you need a second dose. Get the flu vaccine every fall. If you must be around people with colds or the flu, wash your hands often. ? · Avoid secondhand smoke, air pollution, and high altitudes. Also avoid cold, dry air and hot, humid air. Stay at home with your windows closed when air pollution is bad. ?Medicines and oxygen therapy ? · Take your medicines exactly as prescribed.  Call your doctor if you think you are having a problem with your medicine. ? · You may be taking medicines such as: ¨ Bronchodilators. These help open your airways and make breathing easier. Bronchodilators are either short-acting (work for 6 to 9 hours) or long-acting (work for 24 hours). You inhale most bronchodilators, so they start to act quickly. Always carry your quick-relief inhaler with you in case you need it while you are away from home. ¨ Corticosteroids (prednisone, budesonide). These reduce airway inflammation. They come in pill or inhaled form. You must take these medicines every day for them to work well. ? · A spacer may help you get more inhaled medicine to your lungs. Ask your doctor or pharmacist if a spacer is right for you. If it is, ask how to use it properly. ? · Do not take any vitamins, over-the-counter medicine, or herbal products without talking to your doctor first.  
? · If your doctor prescribed antibiotics, take them as directed. Do not stop taking them just because you feel better. You need to take the full course of antibiotics. ? · Oxygen therapy boosts the amount of oxygen in your blood and helps you breathe easier. Use the flow rate your doctor has recommended, and do not change it without talking to your doctor first.  
Activity ? · Get regular exercise. Walking is an easy way to get exercise. Start out slowly, and walk a little more each day. ? · Pay attention to your breathing. You are exercising too hard if you cannot talk while you are exercising. ? · Take short rest breaks when doing household chores and other activities. ? · Learn breathing methods-such as breathing through pursed lips-to help you become less short of breath. ? · If your doctor has not set you up with a pulmonary rehabilitation program, talk to him or her about whether rehab is right for you.  Rehab includes exercise programs, education about your disease and how to manage it, help with diet and other changes, and emotional support. Diet ? · Eat regular, healthy meals. Use bronchodilators about 1 hour before you eat to make it easier to eat. Eat several small meals instead of three large ones. Drink beverages at the end of the meal. Avoid foods that are hard to chew. ? · Eat foods that contain protein so that you do not lose muscle mass. ? · Talk with your doctor if you gain too much weight or if you lose weight without trying. ?Mental health ? · Talk to your family, friends, or a therapist about your feelings. It is normal to feel frightened, angry, hopeless, helpless, and even guilty. Talking openly about bad feelings can help you cope. If these feelings last, talk to your doctor. · When should you call for help? Call 911 anytime you think you may need emergency care. For example, call if: 
? · You have severe trouble breathing. ?Call your doctor now or seek immediate medical care if: 
? · You have new or worse trouble breathing. ? · You cough up blood. ? · You have a fever. ? Watch closely for changes in your health, and be sure to contact your doctor if: 
? · You cough more deeply or more often, especially if you notice more mucus or a change in the color of your mucus. ? · You have new or worse swelling in your legs or belly. ? · You are not getting better as expected. Where can you learn more? Go to http://etelvina-olivier.info/. Tiffany Kapoor in the search box to learn more about \"Chronic Obstructive Pulmonary Disease (COPD): Care Instructions. \" Current as of: May 12, 2017 Content Version: 11.4 © 5832-9051 [employer]. Care instructions adapted under license by [employer] (which disclaims liability or warranty for this information).  If you have questions about a medical condition or this instruction, always ask your healthcare professional. [employer] disclaims any warranty or liability for your use of this information. Please provide this summary of care documentation to your next provider. Signatures-by signing, you are acknowledging that this After Visit Summary has been reviewed with you and you have received a copy. Patient Signature:  ____________________________________________________________ Date:  ____________________________________________________________  
  
Willim Roch Provider Signature:  ____________________________________________________________ Date:  ____________________________________________________________

## 2018-01-23 NOTE — Clinical Note
Status[de-identified] Inpatient [101] Type of Bed: Medical [8] Inpatient Hospitalization Certified Necessary for the Following Reasons: 1. Patient Failed outpatient treatment (further clarification in H&P documentation) Admitting Diagnosis: COPD with acute bronchitis ([facility].) [[id number]] Admitting Diagnosis: Dyspnea [803902] Admitting Diagnosis: COPD with acute exacerbation ([facility].) [[id number]] Admitting Physician: Shay Piedra [[id number]] Attending Physician: Shay Piedra [[id number]] Estimated Length of Stay: 2 Midnights Discharge Plan[de-identified] Home with Office Follow-up

## 2018-01-24 PROBLEM — E78.5 HYPERLIPIDEMIA: Chronic | Status: ACTIVE | Noted: 2018-01-24

## 2018-01-24 LAB
25(OH)D3 SERPL-MCNC: 7 NG/ML (ref 30–100)
ANION GAP SERPL CALC-SCNC: 10 MMOL/L (ref 3–18)
ATRIAL RATE: 75 BPM
BASOPHILS # BLD: 0 K/UL (ref 0–0.1)
BASOPHILS NFR BLD: 0 % (ref 0–2)
BUN SERPL-MCNC: 13 MG/DL (ref 7–18)
BUN/CREAT SERPL: 12 (ref 12–20)
CALCIUM SERPL-MCNC: 10 MG/DL (ref 8.5–10.1)
CALCULATED P AXIS, ECG09: 55 DEGREES
CALCULATED R AXIS, ECG10: 23 DEGREES
CALCULATED T AXIS, ECG11: 49 DEGREES
CHLORIDE SERPL-SCNC: 98 MMOL/L (ref 100–108)
CO2 SERPL-SCNC: 25 MMOL/L (ref 21–32)
CREAT SERPL-MCNC: 1.09 MG/DL (ref 0.6–1.3)
DIAGNOSIS, 93000: NORMAL
DIFFERENTIAL METHOD BLD: ABNORMAL
EOSINOPHIL # BLD: 0 K/UL (ref 0–0.4)
EOSINOPHIL NFR BLD: 0 % (ref 0–5)
ERYTHROCYTE [DISTWIDTH] IN BLOOD BY AUTOMATED COUNT: 13.6 % (ref 11.6–14.5)
EST. AVERAGE GLUCOSE BLD GHB EST-MCNC: 163 MG/DL
GLUCOSE BLD STRIP.AUTO-MCNC: 269 MG/DL (ref 70–110)
GLUCOSE BLD STRIP.AUTO-MCNC: 288 MG/DL (ref 70–110)
GLUCOSE BLD STRIP.AUTO-MCNC: 337 MG/DL (ref 70–110)
GLUCOSE BLD STRIP.AUTO-MCNC: 342 MG/DL (ref 70–110)
GLUCOSE BLD STRIP.AUTO-MCNC: 345 MG/DL (ref 70–110)
GLUCOSE SERPL-MCNC: 293 MG/DL (ref 74–99)
HBA1C MFR BLD: 7.3 % (ref 4.2–5.6)
HCT VFR BLD AUTO: 39.3 % (ref 35–45)
HGB BLD-MCNC: 13.6 G/DL (ref 12–16)
LYMPHOCYTES # BLD: 0.7 K/UL (ref 0.9–3.6)
LYMPHOCYTES NFR BLD: 7 % (ref 21–52)
MCH RBC QN AUTO: 30.4 PG (ref 24–34)
MCHC RBC AUTO-ENTMCNC: 34.6 G/DL (ref 31–37)
MCV RBC AUTO: 87.7 FL (ref 74–97)
MONOCYTES # BLD: 0.1 K/UL (ref 0.05–1.2)
MONOCYTES NFR BLD: 1 % (ref 3–10)
NEUTS SEG # BLD: 9.2 K/UL (ref 1.8–8)
NEUTS SEG NFR BLD: 92 % (ref 40–73)
P-R INTERVAL, ECG05: 150 MS
PLATELET # BLD AUTO: 293 K/UL (ref 135–420)
PMV BLD AUTO: 9.1 FL (ref 9.2–11.8)
POTASSIUM SERPL-SCNC: 4.4 MMOL/L (ref 3.5–5.5)
Q-T INTERVAL, ECG07: 372 MS
QRS DURATION, ECG06: 82 MS
QTC CALCULATION (BEZET), ECG08: 415 MS
RBC # BLD AUTO: 4.48 M/UL (ref 4.2–5.3)
SODIUM SERPL-SCNC: 133 MMOL/L (ref 136–145)
VENTRICULAR RATE, ECG03: 75 BPM
WBC # BLD AUTO: 10 K/UL (ref 4.6–13.2)

## 2018-01-24 PROCEDURE — 82306 VITAMIN D 25 HYDROXY: CPT

## 2018-01-24 PROCEDURE — 74011000250 HC RX REV CODE- 250: Performed by: FAMILY MEDICINE

## 2018-01-24 PROCEDURE — 94660 CPAP INITIATION&MGMT: CPT

## 2018-01-24 PROCEDURE — 80048 BASIC METABOLIC PNL TOTAL CA: CPT | Performed by: FAMILY MEDICINE

## 2018-01-24 PROCEDURE — 74011250636 HC RX REV CODE- 250/636: Performed by: STUDENT IN AN ORGANIZED HEALTH CARE EDUCATION/TRAINING PROGRAM

## 2018-01-24 PROCEDURE — 85025 COMPLETE CBC W/AUTO DIFF WBC: CPT | Performed by: FAMILY MEDICINE

## 2018-01-24 PROCEDURE — 77010033678 HC OXYGEN DAILY

## 2018-01-24 PROCEDURE — 65270000029 HC RM PRIVATE

## 2018-01-24 PROCEDURE — 74011250637 HC RX REV CODE- 250/637: Performed by: FAMILY MEDICINE

## 2018-01-24 PROCEDURE — 74011250636 HC RX REV CODE- 250/636: Performed by: FAMILY MEDICINE

## 2018-01-24 PROCEDURE — 74011250637 HC RX REV CODE- 250/637: Performed by: STUDENT IN AN ORGANIZED HEALTH CARE EDUCATION/TRAINING PROGRAM

## 2018-01-24 PROCEDURE — 83036 HEMOGLOBIN GLYCOSYLATED A1C: CPT

## 2018-01-24 PROCEDURE — 74011636637 HC RX REV CODE- 636/637: Performed by: STUDENT IN AN ORGANIZED HEALTH CARE EDUCATION/TRAINING PROGRAM

## 2018-01-24 PROCEDURE — 82962 GLUCOSE BLOOD TEST: CPT

## 2018-01-24 PROCEDURE — 94640 AIRWAY INHALATION TREATMENT: CPT

## 2018-01-24 PROCEDURE — 36415 COLL VENOUS BLD VENIPUNCTURE: CPT

## 2018-01-24 PROCEDURE — 74011636637 HC RX REV CODE- 636/637: Performed by: EMERGENCY MEDICINE

## 2018-01-24 RX ORDER — ASPIRIN 81 MG/1
81 TABLET ORAL DAILY
Status: DISCONTINUED | OUTPATIENT
Start: 2018-01-24 | End: 2018-01-26 | Stop reason: HOSPADM

## 2018-01-24 RX ORDER — MONTELUKAST SODIUM 10 MG/1
10 TABLET ORAL
Status: DISCONTINUED | OUTPATIENT
Start: 2018-01-24 | End: 2018-01-26 | Stop reason: HOSPADM

## 2018-01-24 RX ORDER — INSULIN LISPRO 100 [IU]/ML
INJECTION, SOLUTION INTRAVENOUS; SUBCUTANEOUS
Status: DISCONTINUED | OUTPATIENT
Start: 2018-01-24 | End: 2018-01-26 | Stop reason: HOSPADM

## 2018-01-24 RX ORDER — PANTOPRAZOLE SODIUM 40 MG/1
40 TABLET, DELAYED RELEASE ORAL DAILY
Status: DISCONTINUED | OUTPATIENT
Start: 2018-01-24 | End: 2018-01-26 | Stop reason: HOSPADM

## 2018-01-24 RX ORDER — IPRATROPIUM BROMIDE AND ALBUTEROL SULFATE 2.5; .5 MG/3ML; MG/3ML
3 SOLUTION RESPIRATORY (INHALATION)
Status: DISCONTINUED | OUTPATIENT
Start: 2018-01-24 | End: 2018-01-26 | Stop reason: HOSPADM

## 2018-01-24 RX ORDER — FLUTICASONE PROPIONATE 50 MCG
2 SPRAY, SUSPENSION (ML) NASAL DAILY
Status: DISCONTINUED | OUTPATIENT
Start: 2018-01-24 | End: 2018-01-24

## 2018-01-24 RX ORDER — GUAIFENESIN 600 MG/1
600 TABLET, EXTENDED RELEASE ORAL
Status: DISCONTINUED | OUTPATIENT
Start: 2018-01-24 | End: 2018-01-26 | Stop reason: HOSPADM

## 2018-01-24 RX ORDER — ALBUTEROL SULFATE 0.83 MG/ML
2.5 SOLUTION RESPIRATORY (INHALATION)
Status: DISCONTINUED | OUTPATIENT
Start: 2018-01-24 | End: 2018-01-26 | Stop reason: HOSPADM

## 2018-01-24 RX ORDER — FLUTICASONE PROPIONATE 50 MCG
2 SPRAY, SUSPENSION (ML) NASAL
Status: DISCONTINUED | OUTPATIENT
Start: 2018-01-24 | End: 2018-01-26 | Stop reason: HOSPADM

## 2018-01-24 RX ORDER — NEOMYCIN/POLYMYXIN B/HYDROCORT 3.5-10K-1
1 SUSPENSION, DROPS(FINAL DOSAGE FORM)(ML) OPHTHALMIC (EYE)
Status: DISCONTINUED | OUTPATIENT
Start: 2018-01-24 | End: 2018-01-26 | Stop reason: HOSPADM

## 2018-01-24 RX ORDER — ONDANSETRON 4 MG/1
8 TABLET, ORALLY DISINTEGRATING ORAL
Status: DISCONTINUED | OUTPATIENT
Start: 2018-01-24 | End: 2018-01-26 | Stop reason: HOSPADM

## 2018-01-24 RX ORDER — LEVOFLOXACIN 5 MG/ML
500 INJECTION, SOLUTION INTRAVENOUS EVERY 24 HOURS
Status: DISCONTINUED | OUTPATIENT
Start: 2018-01-24 | End: 2018-01-26 | Stop reason: HOSPADM

## 2018-01-24 RX ORDER — NEOMYCIN/POLYMYXIN B/HYDROCORT 3.5-10K-1
1 SUSPENSION, DROPS(FINAL DOSAGE FORM)(ML) OPHTHALMIC (EYE)
Status: ON HOLD | COMMUNITY
End: 2018-03-13

## 2018-01-24 RX ORDER — INSULIN GLARGINE 100 [IU]/ML
15 INJECTION, SOLUTION SUBCUTANEOUS
Status: DISCONTINUED | OUTPATIENT
Start: 2018-01-24 | End: 2018-01-25

## 2018-01-24 RX ORDER — FAMOTIDINE 20 MG/1
20 TABLET, FILM COATED ORAL
Status: DISCONTINUED | OUTPATIENT
Start: 2018-01-24 | End: 2018-01-26 | Stop reason: HOSPADM

## 2018-01-24 RX ORDER — LISINOPRIL 20 MG/1
20 TABLET ORAL 2 TIMES DAILY
Status: DISCONTINUED | OUTPATIENT
Start: 2018-01-24 | End: 2018-01-26 | Stop reason: HOSPADM

## 2018-01-24 RX ORDER — BUDESONIDE AND FORMOTEROL FUMARATE DIHYDRATE 160; 4.5 UG/1; UG/1
2 AEROSOL RESPIRATORY (INHALATION) 2 TIMES DAILY
Status: DISCONTINUED | OUTPATIENT
Start: 2018-01-24 | End: 2018-01-24

## 2018-01-24 RX ORDER — MAGNESIUM SULFATE 100 %
4 CRYSTALS MISCELLANEOUS AS NEEDED
Status: DISCONTINUED | OUTPATIENT
Start: 2018-01-24 | End: 2018-01-26 | Stop reason: HOSPADM

## 2018-01-24 RX ORDER — IPRATROPIUM BROMIDE AND ALBUTEROL SULFATE 2.5; .5 MG/3ML; MG/3ML
3 SOLUTION RESPIRATORY (INHALATION)
Status: DISCONTINUED | OUTPATIENT
Start: 2018-01-24 | End: 2018-01-24

## 2018-01-24 RX ORDER — PANTOPRAZOLE SODIUM 40 MG/1
40 TABLET, DELAYED RELEASE ORAL DAILY
Status: ON HOLD | COMMUNITY
End: 2018-03-13

## 2018-01-24 RX ORDER — DEXTROSE 50 % IN WATER (D50W) INTRAVENOUS SYRINGE
25-50 AS NEEDED
Status: DISCONTINUED | OUTPATIENT
Start: 2018-01-24 | End: 2018-01-26 | Stop reason: HOSPADM

## 2018-01-24 RX ADMIN — LISINOPRIL 20 MG: 20 TABLET ORAL at 09:32

## 2018-01-24 RX ADMIN — METHYLPREDNISOLONE SODIUM SUCCINATE 125 MG: 125 INJECTION, POWDER, FOR SOLUTION INTRAMUSCULAR; INTRAVENOUS at 09:32

## 2018-01-24 RX ADMIN — ENOXAPARIN SODIUM 40 MG: 100 INJECTION SUBCUTANEOUS at 00:30

## 2018-01-24 RX ADMIN — LISINOPRIL 20 MG: 20 TABLET ORAL at 17:07

## 2018-01-24 RX ADMIN — LEVOFLOXACIN 500 MG: 5 INJECTION, SOLUTION INTRAVENOUS at 22:27

## 2018-01-24 RX ADMIN — ENOXAPARIN SODIUM 40 MG: 100 INJECTION SUBCUTANEOUS at 22:27

## 2018-01-24 RX ADMIN — IPRATROPIUM BROMIDE AND ALBUTEROL SULFATE 3 ML: 2.5; .5 SOLUTION RESPIRATORY (INHALATION) at 11:38

## 2018-01-24 RX ADMIN — IPRATROPIUM BROMIDE AND ALBUTEROL SULFATE 3 ML: 2.5; .5 SOLUTION RESPIRATORY (INHALATION) at 16:07

## 2018-01-24 RX ADMIN — INSULIN GLARGINE 15 UNITS: 100 INJECTION, SOLUTION SUBCUTANEOUS at 03:07

## 2018-01-24 RX ADMIN — MONTELUKAST SODIUM 10 MG: 10 TABLET, FILM COATED ORAL at 21:28

## 2018-01-24 RX ADMIN — IPRATROPIUM BROMIDE AND ALBUTEROL SULFATE 3 ML: 2.5; .5 SOLUTION RESPIRATORY (INHALATION) at 05:52

## 2018-01-24 RX ADMIN — ACETAMINOPHEN 650 MG: 325 TABLET ORAL at 09:32

## 2018-01-24 RX ADMIN — IPRATROPIUM BROMIDE AND ALBUTEROL SULFATE 3 ML: 2.5; .5 SOLUTION RESPIRATORY (INHALATION) at 20:17

## 2018-01-24 RX ADMIN — IPRATROPIUM BROMIDE AND ALBUTEROL SULFATE 3 ML: 2.5; .5 SOLUTION RESPIRATORY (INHALATION) at 07:12

## 2018-01-24 RX ADMIN — PANTOPRAZOLE SODIUM 40 MG: 40 TABLET, DELAYED RELEASE ORAL at 09:32

## 2018-01-24 RX ADMIN — METHYLPREDNISOLONE SODIUM SUCCINATE 125 MG: 125 INJECTION, POWDER, FOR SOLUTION INTRAMUSCULAR; INTRAVENOUS at 02:32

## 2018-01-24 RX ADMIN — INSULIN LISPRO 6 UNITS: 100 INJECTION, SOLUTION INTRAVENOUS; SUBCUTANEOUS at 09:32

## 2018-01-24 RX ADMIN — ONDANSETRON 8 MG: 4 TABLET, ORALLY DISINTEGRATING ORAL at 03:06

## 2018-01-24 RX ADMIN — INSULIN GLARGINE 15 UNITS: 100 INJECTION, SOLUTION SUBCUTANEOUS at 21:28

## 2018-01-24 RX ADMIN — ASPIRIN 81 MG: 81 TABLET, COATED ORAL at 09:32

## 2018-01-24 RX ADMIN — INSULIN LISPRO 8 UNITS: 100 INJECTION, SOLUTION INTRAVENOUS; SUBCUTANEOUS at 13:28

## 2018-01-24 RX ADMIN — METHYLPREDNISOLONE SODIUM SUCCINATE 60 MG: 125 INJECTION, POWDER, FOR SOLUTION INTRAMUSCULAR; INTRAVENOUS at 17:27

## 2018-01-24 RX ADMIN — INSULIN LISPRO 12 UNITS: 100 INJECTION, SOLUTION INTRAVENOUS; SUBCUTANEOUS at 17:08

## 2018-01-24 RX ADMIN — INSULIN LISPRO 9 UNITS: 100 INJECTION, SOLUTION INTRAVENOUS; SUBCUTANEOUS at 21:29

## 2018-01-24 NOTE — PROGRESS NOTES
Intern Progress Note  [facility]       Patient: Lucas Robins MRN: [id number]  CSN: [id number]    YOB: 1960  Age: 61 y.o. Sex: female    DOA: 1/23/2018 LOS:  LOS: 1 day                    Subjective:     Acute events: Patient reports cough with green sputum production and associated chest pain due to the coughing. SOB at exertion also present. Review of Systems   Constitutional: Negative for fever. Respiratory: Positive for cough, sputum production and shortness of breath. Cardiovascular: Positive for chest pain. Gastrointestinal: Negative for abdominal pain. Objective:      Patient Vitals for the past 24 hrs:   Temp Pulse Resp BP SpO2   01/24/18 0729 97.8 °F (36.6 °C) 89 18 130/75 91 %   01/24/18 0712 - - - - 96 %   01/24/18 0552 - - - - 97 %   01/24/18 0431 97.8 °F (36.6 °C) 89 22 120/67 97 %   01/24/18 0145 - - - - 97 %   01/23/18 2334 98 °F (36.7 °C) 92 22 153/82 95 %   01/23/18 2230 - 85 - - 96 %   01/23/18 2215 - 87 - - 96 %   01/23/18 2200 - 88 - - 96 %   01/23/18 2130 - 90 - 133/60 94 %   01/23/18 2115 - 91 - 144/83 96 %   01/23/18 2100 - 91 - 145/70 95 %   01/23/18 2045 - 89 - 150/66 95 %   01/23/18 2030 - 92 - 143/65 96 %   01/23/18 2015 - 93 - 146/76 96 %   01/23/18 2000 - 89 22 147/66 99 %   01/23/18 1945 - 82 - 154/67 100 %   01/23/18 1930 - 76 - 166/68 100 %   01/23/18 1915 - 83 - (!) 192/93 98 %   01/23/18 1857 98.1 °F (36.7 °C) 92 28 (!) 150/94 97 %         Intake/Output Summary (Last 24 hours) at 01/24/18 0900  Last data filed at 01/24/18 0200   Gross per 24 hour   Intake              390 ml   Output                0 ml   Net              390 ml         Physical Exam:   General:  Alert and Responsive and in No acute distress. CV:  RRR, no rubs gallops or murmurs. RESP:  Unlabored breathing. Lungs with expiratory wheezing in the upper and middle lobes bilaterally. No rales, or rhonchi. Equal expansion bilaterally.     ABD:  Soft, nontender, nondistended. No suprapubic tenderness. MS:  No joint deformity or instability. No atrophy. Neuro: alert x o 3  Ext:  No edema. Skin: Dry skin.       Lab/Data Reviewed:  BMP:   Lab Results   Component Value Date/Time     (L) 01/24/2018 04:26 AM    K 4.4 01/24/2018 04:26 AM    CL 98 (L) 01/24/2018 04:26 AM    CO2 25 01/24/2018 04:26 AM    AGAP 10 01/24/2018 04:26 AM     (H) 01/24/2018 04:26 AM    BUN 13 01/24/2018 04:26 AM    CREA 1.09 01/24/2018 04:26 AM    GFRAA >60 01/24/2018 04:26 AM    GFRNA 52 (L) 01/24/2018 04:26 AM     CBC:   Lab Results   Component Value Date/Time    WBC 10.0 01/24/2018 04:26 AM    HGB 13.6 01/24/2018 04:26 AM    HCT 39.3 01/24/2018 04:26 AM     01/24/2018 04:26 AM        Scheduled Medications Reviewed:  Current Facility-Administered Medications   Medication Dose Route Frequency    aspirin delayed-release tablet 81 mg  81 mg Oral DAILY    insulin glargine (LANTUS) injection 15 Units  15 Units SubCUTAneous QHS    lisinopril (PRINIVIL, ZESTRIL) tablet 20 mg  20 mg Oral BID    montelukast (SINGULAIR) tablet 10 mg  10 mg Oral QHS    pantoprazole (PROTONIX) tablet 40 mg  40 mg Oral DAILY    methylPREDNISolone (PF) (SOLU-MEDROL) injection 125 mg  125 mg IntraVENous Q8H    insulin lispro (HUMALOG) injection   SubCUTAneous AC&HS    levoFLOXacin (LEVAQUIN) 500 mg in D5W IVPB  500 mg IntraVENous Q24H    albuterol-ipratropium (DUO-NEB) 2.5 MG-0.5 MG/3 ML  3 mL Nebulization Q4H RT    budesonide-formoterol (SYMBICORT) 160-4.5 mcg/actuation HFA inhaler 2 Puff  2 Puff Inhalation BID    influenza vaccine 2017-18 (3 yrs+)(PF) (FLUZONE QUAD/FLUARIX QUAD) injection 0.5 mL  0.5 mL IntraMUSCular ONCE    enoxaparin (LOVENOX) injection 40 mg  40 mg SubCUTAneous Q24H         Imaging, microbiology, and EKG/Telemetry:  n/a    Assessment/Plan     61 y.o. female with PMH of COPD, IDDM2, HTN, HLD, GERD, stress incontinence, and SHERRIE on CPAP, now admitted with COPD exacerbation.     COPD Exacerbation w/Chronic Hypoxic Respiratory Failure: Patient is on home chronic supplemental O2 therapy @ 2L NC. She is saturating at 91% with 2 L. Patient had low dose CT chest in 11/2016 demonstrating a small 0.4cm ground glass density. Blood cultures show no growth after 8 hours. - Continue home supplemental O2 therapy, wean appropriately to maintain O2 sats >88%  - Continue duo-nebs q4h per RT  - Continue IV levaquin 500 mg daily   - Continue IV solu-medrol 125 mg q8h  - Continue symbicort 2 puffs BID   - Continue home singulair 10 mg daily qhs  - F/u CT Chest w/wo contrast while hospitalized  -Pulmonary consulted     IDDM2: Hgb A1c 7.3 (24//2018). Unable to tolerate metformin d/t abdominal pain & diarrhea. On basal insulin + SSI as an outpatient. FBG uncontrolled in the 200's.   - Hold home humalog SSI  - Continue home Lantus 15 units qhs  - SSI + Accuchecks ACHS     HTN/HLD/CHFpEF: Last Echo (09/2016) showed EF 60% and grade 1 diastolic dysfunction.   - Continue home ASA 81 mg daily  - Continue home lisinopril 20 mg BID  - Daily BMP  - Monitor VS, per unit routine     GERD(stable): EGD (08/2017) showed hiatal hernia and short-segment Derek's esophagus.  - Continue home protonix 40 mg daily  - Continue home pepcid 20 mg BID prn     SHERRIE on CPAP(stable):  - Continue CPAP qhs, per RT     Diet: Cardiac Regular  DVT Prophylaxis: Lovenox  Code Status: FULL  Point of Contact: Saladrienne Joce, [phone] (Relationship: Daughter)     Disposition and anticipated LOS: Aman Olson MD, PGY-1  [facility] [address]

## 2018-01-24 NOTE — ROUTINE PROCESS
TRANSFER - IN REPORT:    Verbal report received from Amy Jenkins RN(name) on Izabela King  being received from [facility]  (unit) for routine progression of care      Report consisted of patients Situation, Background, Assessment and   Recommendations(SBAR). Information from the following report(s) SBAR, Kardex, Intake/Output, MAR and Recent Results was reviewed with the receiving nurse. Opportunity for questions and clarification was provided. Assessment completed upon patients arrival to unit and care assumed. Received to floor. Ambulated to bed. Oriented to room and call bell in reach.

## 2018-01-24 NOTE — ROUTINE PROCESS
Bedside and Verbal shift change report given to Marii Kwok RN (oncoming nurse) by Lisa Mason RN   (offgoing nurse). Report included the following information SBAR, Kardex, Intake/Output, MAR and Recent Results.

## 2018-01-24 NOTE — H&P
Ad[facility] and Physical  [facility]      Patient: Sophia Alarcon MRN: [id number]  CSN: [id number]    YOB: 1960  Age: 61 y.o. Sex: female      DOA: 1/23/2018       HPI:     Sophia Alarcon is a 61 y.o. female with PMH COPD, IDDM2, HTN, HLD, GERD, stress incontinence, SHERRIE on CPAP, now presenting with complaint of progressive and worsening SOB. Patient presented to [facility] ED on 01/12/18 with c/o worsening SOB x3 days. Patient was treated for COPD exacerbation, receiving duo-nebs, magnesium, and steroids in the ED. Patient was then discharged in a stable condition on oral prednisone and resumption of her home controllers. Patient reported that she finished the oral steroids but still did not feel any better. Patient reported having worsening SOB with any form of ambulation in her house. Patient reported not being able to use the restroom in her home on the 2nd floor, but instead using a bedside commode in her room. Patient also has been unable to do regular ADL's such as cook and clean without becoming severely SOB. Patient had gone from using her home duo-nebulizer treatments 2x a week to about 5-6x a day with temporary resolution in her SOB. Patient denied fevers/chills, abdominal pain above baseline, n/v/c/d, or lower extremity edema. Patient endorse lower back pain, bilateral side/rib pain, and substernal chest pain all associated with coughing and improved at rest. Patient endorsed productive cough of light green-norberto sputum over the past week.      ED Course: CBC, CMP, Cardiac Panel, NT-pro BNP, Rapid Flu, Blood Cultures, CXR, EKG, Duo-neb x3, IV magnesium 2g x1, IV Solu-medrol 125 mg x1, IV Zofran 4 mg x1    Review of Systems -   General ROS: negative for chills, fever, night sweats, weight gain and weight loss  Psychological ROS: negative for anxiety and depression  Ophthalmic ROS: negative for blurry vision, decreased vision or loss of vision  ENT ROS: negative for headaches, hearing change or visual changes  Hematological and Lymphatic ROS: negative for bruising, jaundice  Respiratory ROS: negative for cough, hemoptysis, shortness of breath, orthopnea, paroxysmal dyspnea, or wheezing  Cardiovascular ROS: negative for chest pain, dyspnea on exertion, edema, loss of consciousness, or palpitations   Gastrointestinal ROS: negative for abdominal pain, blood in stools, change in stools, constipation, diarrhea, hematemesis, melena, nausea/vomiting or swallowing difficulty/pain  Genito-Urinary ROS: negative for dysuria, hematuria or urinary frequency/urgency  Musculoskeletal ROS: negative for joint pain, joint swelling or muscle pain  Neurological ROS: negative for dizziness, headaches, numbness/tingling or weakness  Dermatological ROS: negative for rash or skin lesion changes      Past Medical History:   Diagnosis Date    Asthma     COPD     Cystocele, midline     Diabetes mellitus ([facility] [address]     GERD (gastroesophageal reflux disease)     Hidradenitis suppurativa     Hyperlipidemia     Hypertension     SHERRIE on CPAP     CPAP    Stress incontinence        Past Surgical History:   Procedure Laterality Date    BREAST SURGERY PROCEDURE UNLISTED      Right breast benign tumor removal    HX APPENDECTOMY      HX CHOLECYSTECTOMY      HX DILATION AND CURETTAGE      Dysfunctional uterine bleeding, thought 2/2 fibroids    HX TUBAL LIGATION         Family History   Problem Relation Age of Onset    Hypertension Mother     Stroke Mother     Breast Cancer Mother      Bilateral mastectomies    Cancer Mother      ovarian and breast    Heart Failure Mother     Heart Attack Father      2011    Heart Surgery Father      CABG    Heart Failure Father     COPD Sister      Heavy smoker    Cancer Sister      ovarian    Heart Failure Sister     Lung Disease Sister     Asthma Child     Cancer Maternal Aunt      pancreatic    Cancer Maternal Grandfather stomach       Social History     Social History    Marital status: LEGALLY      Spouse name: N/A    Number of children: N/A    Years of education: N/A     Social History Main Topics    Smoking status: Former Smoker     Packs/day: 1.00     Years: 30.00     Types: Cigarettes     Quit date: 9/6/2006    Smokeless tobacco: Never Used      Comment: 1-1.5 packs per day    Alcohol use No    Drug use: No    Sexual activity: No     Other Topics Concern    Not on file     Social History Narrative       Allergies   Allergen Reactions    Ancef [Cefazolin] Hives    Contrast Agent [Iodine] Anaphylaxis, Shortness of Breath and Swelling     Needs pre-medication for IV contrast with Benadryl, Solu-Medrol    Fish Containing Products Anaphylaxis     Pt states she had a severe allergic reaction at 7 y/o.  Metformin Other (comments)     Abdominal pain, diarrhea.  Codeine Other (comments)     Altered mental status         Physical Exam:     Patient Vitals for the past 24 hrs:   Temp Pulse Resp BP SpO2   01/23/18 2334 98 °F (36.7 °C) 92 22 153/82 95 %   01/23/18 2230 - 85 - - 96 %   01/23/18 2215 - 87 - - 96 %   01/23/18 2200 - 88 - - 96 %   01/23/18 2130 - 90 - 133/60 94 %   01/23/18 2115 - 91 - 144/83 96 %   01/23/18 2100 - 91 - 145/70 95 %   01/23/18 2045 - 89 - 150/66 95 %   01/23/18 2030 - 92 - 143/65 96 %   01/23/18 2015 - 93 - 146/76 96 %   01/23/18 2000 - 89 22 147/66 99 %   01/23/18 1945 - 82 - 154/67 100 %   01/23/18 1930 - 76 - 166/68 100 %   01/23/18 1915 - 83 - (!) 192/93 98 %   01/23/18 1857 98.1 °F (36.7 °C) 92 28 (!) 150/94 97 %       Physical Exam:  General:  Alert and Responsive and in No acute distress. HEENT: Conjunctiva pink, sclera anicteric. EOMI. Pharynx moist, nonerythematous. Moist mucous membranes. Thyroid not enlarged, no nodules. No cervical, supraclavicular, occipital or submandibular lymphadenopathy. No other gross abnormalities present. CV:  RRR, no murmurs/rubs/gallops.  No visible pulsations or thrills. RESP:  Mild tachypnea but otherwise unlabored breathing. Lungs clear to auscultation. No wheeze, rales, or rhonchi. Equal expansion bilaterally. ABD:  Soft, nontender, nondistended. No hepatosplenomegaly. No suprapubic tenderness. MS:  No joint deformity or instability. No atrophy. Neuro: A+Ox3. 5/5 strength bilateral upper extremities and lower extremities. Ext:  No edema. 2+ radial and dp pulses bilaterally. Skin:  No rashes, lesions, or ulcers. Good turgor. IMAGING:     Xr Chest Pa Lat    Result Date: 1/24/2018  IMPRESSION: Chronic scarring at the left lateral costophrenic angle. Scarring right middle lobe. Stable mild cardiomegaly. Hyperinflation       Recent Results (from the past 12 hour(s))   CBC WITH AUTOMATED DIFF    Collection Time: 01/23/18  7:21 PM   Result Value Ref Range    WBC 7.1 4.6 - 13.2 K/uL    RBC 4.51 4.20 - 5.30 M/uL    HGB 13.8 12.0 - 16.0 g/dL    HCT 39.3 35.0 - 45.0 %    MCV 87.1 74.0 - 97.0 FL    MCH 30.6 24.0 - 34.0 PG    MCHC 35.1 31.0 - 37.0 g/dL    RDW 13.6 11.6 - 14.5 %    PLATELET 628 772 - 011 K/uL    MPV 8.8 (L) 9.2 - 11.8 FL    NEUTROPHILS 63 40 - 73 %    LYMPHOCYTES 30 21 - 52 %    MONOCYTES 6 3 - 10 %    EOSINOPHILS 1 0 - 5 %    BASOPHILS 0 0 - 2 %    ABS. NEUTROPHILS 4.4 1.8 - 8.0 K/UL    ABS. LYMPHOCYTES 2.1 0.9 - 3.6 K/UL    ABS. MONOCYTES 0.4 0.05 - 1.2 K/UL    ABS. EOSINOPHILS 0.1 0.0 - 0.4 K/UL    ABS.  BASOPHILS 0.0 0.0 - 0.1 K/UL    DF AUTOMATED     METABOLIC PANEL, COMPREHENSIVE    Collection Time: 01/23/18  7:21 PM   Result Value Ref Range    Sodium 137 136 - 145 mmol/L    Potassium 4.5 3.5 - 5.5 mmol/L    Chloride 100 100 - 108 mmol/L    CO2 32 21 - 32 mmol/L    Anion gap 5 3.0 - 18 mmol/L    Glucose 174 (H) 74 - 99 mg/dL    BUN 11 7.0 - 18 MG/DL    Creatinine 0.88 0.6 - 1.3 MG/DL    BUN/Creatinine ratio 13 12 - 20      GFR est AA >60 >60 ml/min/1.73m2    GFR est non-AA >60 >60 ml/min/1.73m2    Calcium 9.7 8.5 - 10.1 MG/DL    Bilirubin, total 0.7 0.2 - 1.0 MG/DL    ALT (SGPT) 51 13 - 56 U/L    AST (SGOT) 50 (H) 15 - 37 U/L    Alk. phosphatase 87 45 - 117 U/L    Protein, total 8.2 6.4 - 8.2 g/dL    Albumin 3.6 3.4 - 5.0 g/dL    Globulin 4.6 (H) 2.0 - 4.0 g/dL    A-G Ratio 0.8 0.8 - 1.7     CARDIAC PANEL,(CK, CKMB & TROPONIN)    Collection Time: 01/23/18  7:21 PM   Result Value Ref Range     26 - 192 U/L    CK - MB 1.0 <3.6 ng/ml    CK-MB Index 0.8 0.0 - 4.0 %    Troponin-I, Qt. <0.02 0.0 - 0.045 NG/ML   NT-PRO BNP    Collection Time: 01/23/18  7:21 PM   Result Value Ref Range    NT pro-BNP 22 0 - 900 PG/ML   INFLUENZA A & B AG (RAPID TEST)    Collection Time: 01/23/18  7:30 PM   Result Value Ref Range    Influenza A Antigen NEGATIVE  NEG      Influenza B Antigen NEGATIVE  NEG         Assessment/Plan:   61 y.o. female with PMH of COPD, IDDM2, HTN, HLD, GERD, stress incontinence, and SHERRIE on CPAP, now admitted with COPD exacerbation. COPD Exacerbation w/Chronic Hypoxic Respiratory Failure: Patient on is on home chronic supplemental O2 therapy @ 2L NC. Patient was recently treated in outpatient setting for COPD exacerbation in 12/2017. Patient had low dose CT chest in 11/2016 demonstrating a small 0.4cm ground glass density. Patient did meet 2/4 SIRS criteria (tachycardia, tachypnea) without an expected source of infection. Admission CXR showed signs suggestive of COPD exacerbation. Blood cultures were drawn in ED.  Duo-nebs x3, IV magnesium 2 g x1; IV solu-medrol 125 mg x1, IV levaquin 750 mg x1 were given in ED.  - Continue home supplemental O2 therapy, wean appropriately to maintain O2 sats >88%  - Continue duo-nebs q3h per RT, space out appropriately  - Continue IV levaquin 500 mg daily   - Continue IV solu-medrol 125 mg q8h  - Continue home advair 2 puffs BID, pharmacy to substitute  - Continue home singulair 10 mg daily qhs  - Monitor VS, per unit routine  - F/u CT Chest w/wo contrast while hospitalized    IDDM2: Last Hgb A1c 7.2 (06/2017). Unable to tolerate metformin d/t abdominal pain & diarrhea. On basal insulin + SSI as an outpatient. - Hold home humalog SSI  - Continue home Lantus 15 units qhs  - SSI + Accuchecks ACHS  - F/u Hgb A1c in AM  - Daily BMP  - Cardiac Regular Diet    HTN/HLD/CHFpEF: Last Echo (09/2016) showed EF 60% and grade 1 diastolic dysfunction. Last lipid panel (04/2017): Chol 213, Trigylcerides 171, HDL 44, . BP ranging 130-160s/60-80s while in the ED.  - Continue home ASA 81 mg daily  - Continue home lisinopril 40 mg daily  - Daily BMP  - Monitor VS, per unit routine    GERD: EGD (08/2017) showed hiatal hernia and short-segment Derek's esophagus.  - Continue home protonix 40 mg daily  - Continue home pepcid 20 mg BID prn    SHERRIE on CPAP:  - CPAP qhs, per RT    Diet: Cardiac Regular  DVT Prophylaxis: Lovenox  Code Status: FULL  Point of Contact: Cobian Madeline, [phone] (Relationship: Daughter)    Disposition and anticipated LOS: Bonifacio Madsen MD, PGY-1  [facility]  1/24/2018             Senior Resident History and Physical  [facility]    HPI:     Nunu Guajardo is a 61 y.o. female with a PMH of COPD on home O2, asthma, Type II DM, HTN, SHERRIE and GERD who came into the ED with shortness of breath for the last week. Ms. Debbi Siemens had an ED visit on 1/12/18 for worsening of chronic SOB and was prescribed prednisone which relieved her symptoms only slightly for a few days. Over the past week though she has been using her Albuterol inhaler increasingly more with worsening of her shortness of breath. At baseline she uses Duo-Nebs 1-2 times/week at home, but for the past week she has been using them 5-6 times/day. Pt also stated that she has not been able to get to the bathroom on the 2nd floor of her home and has had to use a bedside commode downstairs.  Today, she presented with complaints of SOB and tachypnea. Her work-up in the ED included a CBC wnl, a reassuring CMP, a negative rapid Flu, a negative cardiac panel, a normal pro-BNP, and a CXR that showed hyperinflation. Ms. Junior Rasheed was treated with Duo-Nebs X2, Solu-Medrol 125 mg IV, and Zofran, after which she still complained of SOB. She was also given Levaquin 750 mg IV and magnesium sulfate 2 g IV. Her vitals have been stable and she hasn't been hypoxic, but she still complains of SOB. She is now admitted with an exacerbation of COPD.     HPI, ROS, PMH, PSH, Family Hx, Social Hx, home medications, and allergies as above in intern H&P. I agree with history as above. Physical Exam:     Visit Vitals    /82 (BP 1 Location: Left arm, BP Patient Position: At rest)    Pulse 92    Temp 98 °F (36.7 °C)    Resp 22    Ht 5' 3\" (1.6 m)    Wt 114.2 kg (251 lb 11.2 oz)    SpO2 95%    Breastfeeding No    BMI 44.59 kg/m2       General:  WD, WN, NAD  HEENT:  NCAT. Conjunctiva pink, sclera anicteric. EOMI. Pharynx moist, no erythema. Moist mucous membranes. No supraclavicular, or submandibular lymphadenopathy, mild anterior cervical lymphadenopathy. CV:  RRR, no mumurs. RESP:  Unlabored breathing. CTAB, no wheeze, rales, or rhonchi. ABD:  Soft, nontender, nondistended. Normoactive bowel sounds. No hepatosplenomegaly. No suprapubic tenderness. MS:  No joint deformity or instability. No atrophy. Neuro:  5/5 strength bilateral upper extremities and lower extremities. A&OX3  Ext:  No edema. 2+ radial and dp pulses bilaterally. Skin:  No rashes, lesions, or ulcers. Good turgor    Imaging     CXR 1/23/18   IMPRESSION:  Chronic scarring at the left lateral costophrenic angle. Scarring right middle lobe. Stable mild cardiomegaly. Hyperinflation    Assessment/Plan:   Salinas Rashid is a 60 y/o female with a PMH of COPD on home O2, asthma, Type II DM, HTN, SHERRIE and GERD, now admitted with an exacerbation of COPD.     Exacerbation of COPD- Followed by Dr. Vadim Benz, Pulmonology, Pt Is on continuous home O2 at 2 LPM; low dose CT Chest 11/2016 showed 0.4 cm ground glass density, read as benign, rec'd 12 mo f/u;  Received * day course of Prednisone that began on 1/12, with gradual worsening of SOB over the last few days; CXR in ED shows no acute cardiopulmonary process; still SOB despite treatment with Duo-Nebs X2 and Solu-medrol 125 mg IV in ED; In ED- RR 22, SpO2 94%+  - Continue home O2 at 2 LPM  - Continue home Advair 230-21 2 puffs BID; pharmacy may substitute   - Continue home Montelukast 10 mg daily   - Continue home Flonase PRN   - Duo-nebs q3hrs per RT   - Solu-Medrol 125 mg q8hrs   - Will transition to Prednisone 60 mg PO X  5 days  - Levaquin 500 mg IV, will transition to PO for home dosing     - Consider CT Chest W&Wo contrast- to f/u on CT chest 11/2016; pt does state allergy to contrast dye, has had to be pre-medicated previously   - Will call and set pt up with appt with Dr. Vadim Benz; per outpatient notes pt was supposed see months ago but hasn't     Type II DM- Last HbA1c 7.2 in 7/2018; Pt previously on Metformin but unable to tolerate due to diarrhea and possible lactic acidosis that developed   - Continue home Lantus 15 units nightly   - SSI w/Accuchecks ACHS   - Will check HbA1c     HTN, Hx diastolic CHF- BP Not well controlled as outpatient; Last TTE showed EF 60% with G1DD    - Continue home ASA 81 mg daily  - Continue home Lisinopril 40 mg daily     SHERRIE  - CPAP per RT     GERD  - Continue home Protonix 40 mg daily   - Continue Pepcid 20 mg BID PRN         H.  Ashli Meléndez, DO   [facility] [place]   1/24/2018

## 2018-01-24 NOTE — PROGRESS NOTES
Physician Assistant Student Progress Note  [facility]    *ATTENTION:  This note has been created by a medical student for educational purposes only. Please do not refer to the content of this note for clinical decision-making, billing, or other purposes. Please see attending physicians note to obtain clinical information on this patient. *     Patient: Doron Ortiz MRN: [id number]  CSN: [id number]    YOB: 1960  Age: 61 y.o. Sex: female    DOA: 1/23/2018 LOS:  LOS: 1 day   BMI: Body mass index is 44.59 kg/(m^2). Subjective:      Patient with no acute events overnight. This morning Ms. Hawk Hobbs was sitting on the side of the bed, resting comfortably. Patient stated she has just got up to walk to the bathroom and became slightly short of breath however, she feels better then yesterday. She states that she has not followed up with pulmonary as outpatient because her father had a heart attack and she was out of town. She is normally followed by Dr. Saintclair Beer. Vit. D level low from 2017. Consider re-checking and supplementing as low levels can worsen COPD exacerbations. Review of Systems:  Constitutional: Negative for fever, chills and diaphoresis. Eyes: Negative for blurred vision and double vision. Respiratory: Positive for SOB, cough and sputum. Negative for hemoptysis. Cardiovascular: Positive for chest pain when coughing. Negative for palpitations. Gastrointestinal: Negative for nausea and vomiting. Neurological: Negative for dizziness and headaches.      Objective:     Patient Vitals for the past 24 hrs:   Temp Pulse Resp BP SpO2   01/24/18 0552 - - - - 97 %   01/24/18 0431 97.8 °F (36.6 °C) 89 22 120/67 97 %   01/24/18 0145 - - - - 97 %   01/23/18 2334 98 °F (36.7 °C) 92 22 153/82 95 %   01/23/18 2230 - 85 - - 96 %   01/23/18 2215 - 87 - - 96 %   01/23/18 2200 - 88 - - 96 %   01/23/18 2130 - 90 - 133/60 94 %   01/23/18 2115 - 91 - 144/83 96 %   01/23/18 2100 - 91 - 145/70 95 %   01/23/18 2045 - 89 - 150/66 95 %   01/23/18 2030 - 92 - 143/65 96 %   01/23/18 2015 - 93 - 146/76 96 %   01/23/18 2000 - 89 22 147/66 99 %   01/23/18 1945 - 82 - 154/67 100 %   01/23/18 1930 - 76 - 166/68 100 %   01/23/18 1915 - 83 - (!) 192/93 98 %   01/23/18 1857 98.1 °F (36.7 °C) 92 28 (!) 150/94 97 %       Intake and Output:  Current Shift:    Last three shifts: 01/22 1901 - 01/24 0700  In: 150 [I.V.:150]  Out: -     PHYSICAL EXAM:  General:  Alert and Responsive and in no acute distress. HEENT: Conjunctiva pink, sclera anicteric. PERRL. EOMI. CV:  RRR, no murmurs, rubs, or gallops. RESP: Bilateral expiratory wheezes, more predominant on the right. ABD:  Soft, nontender, nondistended. Normoactive bowel sounds. MS:  No joint deformity or instability. Neuro: A+Ox3. Ext:  +1 edema bilaterally. 2+ radial and dp pulses bilaterally. Lab/Data Reviewed:  Basic Chemistry  Recent Labs      01/24/18 0426 01/23/18 1921   GLU  293*  174*   NA  133*  137   K  4.4  4.5   CL  98*  100   CO2  25  32   BUN  13  11   CREA  1.09  0.88   CA  10.0  9.7       Liver Enzymes  Recent Labs      01/23/18 1921   TP  8.2   ALB  3.6   GLOB  4.6*   AGRAT  0.8   SGOT  50*   AP  87       Complete Blood Count with Differential  Recent Labs      01/24/18 0426 01/23/18 1921   WBC  10.0  7.1   RBC  4.48  4.51   HGB  13.6  13.8   HCT  39.3  39.3   PLT  293  282   GRANS  92*  63   LYMPH  7*  30   EOS  0  1       Microbiology  No results for input(s): SDES, CULT in the last 72 hours. No results for input(s): CULT in the last 72 hours.     Scheduled Medications Reviewed:  Current Facility-Administered Medications   Medication Dose Route Frequency    aspirin delayed-release tablet 81 mg  81 mg Oral DAILY    insulin glargine (LANTUS) injection 15 Units  15 Units SubCUTAneous QHS    lisinopril (PRINIVIL, ZESTRIL) tablet 20 mg  20 mg Oral BID    montelukast (SINGULAIR) tablet 10 mg  10 mg Oral QHS    pantoprazole (PROTONIX) tablet 40 mg  40 mg Oral DAILY    methylPREDNISolone (PF) (SOLU-MEDROL) injection 125 mg  125 mg IntraVENous Q8H    insulin lispro (HUMALOG) injection   SubCUTAneous AC&HS    levoFLOXacin (LEVAQUIN) 500 mg in D5W IVPB  500 mg IntraVENous Q24H    albuterol-ipratropium (DUO-NEB) 2.5 MG-0.5 MG/3 ML  3 mL Nebulization Q4H RT    enoxaparin (LOVENOX) injection 40 mg  40 mg SubCUTAneous Q24H    influenza vaccine 2017-18 (3 yrs+)(PF) (FLUZONE QUAD/FLUARIX QUAD) injection 0.5 mL  0.5 mL IntraMUSCular PRIOR TO DISCHARGE       Imaging   CXR from 1/23/2018 IMPRESSION:  Chronic scarring at the left lateral costophrenic angle. Scarring right middle lobe. Stable mild cardiomegaly. Hyperinflation    EKG/Telemetry:  1/23/2018: Normal sinus rhythm  Cannot rule out Anterior infarct , age undetermined. Abnormal ECG. When compared with ECG of 09-FEB-2017 05:17,     Oxygen:  2L nasal cannula   Uses CPAP PRN at night     Assessment/Plan     61 y.o. female with PMH of COPD, IDDM2, HTN, HLD, GERD, stress incontinence, and SHERRIE on CPAP, now admitted with COPD exacerbation.     COPD Exacerbation w/Chronic Hypoxic Respiratory Failure: Patient on is on home chronic supplemental O2 therapy @ 2L NC. Patient was recently treated in outpatient setting for COPD exacerbation in 12/2017. Patient had low dose CT chest in 11/2016 demonstrating a small 0.4cm ground glass density. CXR 1/24/2018 showed signs of COPD exacerbation. Sees Dr. Ana Aguirre with Pulmonology as outpatient. Uses CPAP prn for SHERRIE. - Continue home O2 therapy, wean appropriately to maintain O2 sats >88%  - Continue duo-nebs q3h per RT, space out appropriately  - Continue IV levaquin 500 mg daily   - Continue IV solu-medrol 125 mg q8h  - Continue home advair 2 puffs BID, pharmacy to substitute  - Continue home singulair 10 mg daily qhs   - Influenza negative   - Tylenol PRN for headaches associated with duonebs.    - F/u BCx - NGTD  - F/u CT Chest w/o contrast to follow up on . 4mm nodule in 2016. Contrast allergy but states she has had one in the past and was pre-medicated. - Pulmonary Consult   - Sputum culture   - Patient my benefit from a long term steroid taper of 2-3 weeks as overusing her duonebs daily increases her risk for rebound and tachycardia. Vitamin D Deficiency: Lab from 2/12/2017 showed Vit. D level of 6.5. Would consider supplementing and correcting as it can reduce the incidence of moderate/severe COPD exacerbations and upper URI.   - Repeat vitamin D   - Correct as necessay      IDDM2: Admission A1c 7.3 (01/2018) Basal insulin and SSI. Patient states she is well controlled with home regimen. Avoid metformin as patient has h/o lacic acidosis and diarrhea when using.   - Hold home humalog SSI  - Continue home Lantus 15 units qhs  - SSI + Accuchecks ACHS  - Daily BMP  - Cardiac Regular Diet     HTN/HLD/CHFpEF: Last Echo (09/2016) showed EF 60% and grade 1 diastolic dysfunction. Last lipid panel (04/2017): Chol 213, Trigylcerides 171, HDL 44, . BP ranging 130-160s/60-80s while in the ED.  - Continue home ASA 81 mg daily  - Continue home lisinopril 40 mg daily , could consider switching to a non-ACE like HCTZ d/t pateints h/o COPD however have to take in consideration risk of hyperglycemia.   - Daily BMP  - Monitor VS, per unit routine     GERD: EGD (08/2017) showed hiatal hernia and short-segment Derek's esophagus.  - Continue home protonix 40 mg daily  - Continue home pepcid 20 mg BID prn     SHERRIE on CPAP:  - CPAP qhs, per RT      Activity: Per PT/OT  Diet: Cardiac  DVT Prophylaxis: Lovenox  GI Prophylaxis: Protonix   Code Status: FULL  Disposition: >2 midnights  Lines/Tubes: PIV  Point of Contact: Maria L Andres, [phone] (Relationship: Daughter)    Analia Cash.  NILES ChS  Physician Assistant Student   JALEEL DUMAS [facility]  January 24, 2018 7:23 AM

## 2018-01-24 NOTE — ED NOTES
TRANSFER - OUT REPORT:    Verbal report given to Vadim Gómez RN(name) on [facility]  being transferred to [address] [place] [address](unit) for routine progression of care   Report consisted of patients Situation, Background, Assessment and Recommendations(SBAR). Information from the following report(s) SBAR, ED Summary, Procedure Summary, MAR, Recent Results and Cardiac Rhythm NSR was reviewed with the receiving nurse. Opportunity for questions and clarification was provided.       Patient transported with transport

## 2018-01-24 NOTE — PROGRESS NOTES
Patient removed from night time BiPAP use and put on 2 LPM nasal cannula, tolerating well:     01/24/18 0712   Oxygen Therapy   O2 Sat (%) 96 %   Pulse via Oximetry 81 beats per minute   O2 Device Nasal cannula   O2 Flow Rate (L/min) 2 l/min

## 2018-01-24 NOTE — CONSULTS
[facility]. Pulmonary, Critical Care, and Sleep Medicine    Initial Patient Consult    Name: Khadijah Hamilton MRN: [id number]   : 1960 Hospital: [facility]   Date: 2018        IMPRESSION:   · 1. COPD exacerbation with improved wheezing, mild hypoxia and improved work of breathing - no clinical or radiological evidence of pneumonia and etiology to explain patient's symptoms, negative. Patient with moderate to severe COPD on home o2, 2 Ltrs at baseline (GOLD Class B), MMRC class 2, last exacerbation last year    · 2. Morbid obesity, SHERRIE with night time desaturations - on CPAP at home with O2 bleed, cont at home settings. · 3. Hx of hypertension, chronic diastolic CHF- well compensated  · 4. Hx of GERD  · 5. Hx of diverticulosis  · 6. Former nicotine dependence    PLAN:   · 1. BiPAP at night, and on PRN basis during day- adjust settings per goal SPO2> 88%. Supplemental O2 via NC when off of BiPAP, titrate flow for goal SPO2> 88%. · 2. Continue bronchodilators- Brovana and Pulmicort for patient's ease of use- may switch to Advair HFA at DC home  · 3. Steroids - Continue systemic steroids  · 4. Antibiotic choice: Levaquin - for five days. Check Influenza PCR. Get resp Cx  · 5. Aspiration prevention bundle, head of the bed at 30' all times, pulmonary hygiene care  · 6. Glycemic control as needed while on steroids  · 7. Stress ulcer prophylaxis  · 8. DVT prophylaxis  · 9. OutPatient follow up- Dr. Cecile Baxter  · 10. Complete cessation of smoking         HPI:      Khadijah Hamilton is a 61 y.o. female with PMH Severe COPD (follows with Dr. Cecile Baxter, is on Spiriva 2 puffs once daily and Advair 2 puffs twice daily and as needed albuterol), SHERRIE on CPAP, IDDM2, HTN, HLD, GERD, stress incontinence, SHERRIE on CPAP, now presenting with complaint of progressive and worsening SOB.     Patient presented to  [facility] ED on 18 with c/o worsening SOB x3 days.  Patient was treated for COPD exacerbation, receiving duo-nebs, magnesium, and steroids in the ED. Patient was then discharged in a stable condition on oral prednisone and resumption of her home controllers.     Patient reported that she finished the oral steroids but still did not feel any better. Patient reported having worsening SOB with any form of ambulation in her house. Patient reported not being able to use the restroom in her home on the 2nd floor, but instead using a bedside commode in her room. Patient also has been unable to do regular ADL's such as cook and clean without becoming severely SOB. Patient had gone from using her home duo-nebulizer treatments 2x a week to about 5-6x a day with temporary resolution in her SOB. Patient denied fevers/chills, abdominal pain above baseline, n/v/c/d, or lower extremity edema. Patient endorse lower back pain, bilateral side/rib pain, and substernal chest pain all associated with coughing and improved at rest. Patient endorsed productive cough of light green-norberto sputum over the past week.      ED Course: CBC, CMP, Cardiac Panel, NT-pro BNP, Rapid Flu, Blood Cultures, CXR, EKG, Duo-neb x3, IV magnesium 2g x1, IV Solu-medrol 125 mg x1, IV Zofran 4 mg x1     Review of Systems -  Has green sputum for last 1 week. Usually does not produce mucus with mild chronic cough. Denies feet swelling, orthopnea, PNA.  Did not take flu shot yet       Past Medical History:   Diagnosis Date    Asthma      COPD      Cystocele, midline      Diabetes mellitus ([facility] [address]      GERD (gastroesophageal reflux disease)      Hidradenitis suppurativa      Hyperlipidemia      Hypertension      SHERRIE on CPAP       CPAP    Stress incontinence       Past Surgical History:   Procedure Laterality Date    BREAST SURGERY PROCEDURE UNLISTED      Right breast benign tumor removal    HX APPENDECTOMY      HX CHOLECYSTECTOMY      HX DILATION AND CURETTAGE      Dysfunctional uterine bleeding, thought 2/2 fibroids    HX TUBAL LIGATION        Prior to Admission medications    Medication Sig Start Date End Date Taking? Authorizing Provider   pantoprazole (PROTONIX) 40 mg tablet Take 40 mg by mouth daily. Yes Historical Provider   neomycin-polymyxin-hc (CORTISPORIN) 3.5-10,000-10 mg-unit-mg/mL ophthalmic suspension Administer 1 Drop to both eyes four (4) times daily as needed. Yes Historical Provider   lisinopril (PRINIVIL, ZESTRIL) 20 mg tablet Take 20 mg by mouth two (2) times a day. Historical Provider   ipratropium (ATROVENT) 0.02 % nebulizer solution Take  by inhalation four (4) times daily as needed. 5/10/16   Historical Provider   OXYGEN-AIR DELIVERY SYSTEMS 2 L by Nasal route continuous. Historical Provider   fluticasone-salmeterol (ADVAIR HFA) 489-56 mcg/actuation inhaler Take 2 Puffs by inhalation two (2) times a day. Historical Provider   insulin glargine (LANTUS) 100 unit/mL injection 10 Units by SubCUTAneous route nightly. Indications: type 2 diabetes mellitus  Patient taking differently: 15 Units by SubCUTAneous route nightly. Indications: type 2 diabetes mellitus 2/14/17   Noami Levine,    Diabetic Supplies, [place]. misc Diabetic needles 1/2 inch 31 gauge - 1 injection daily 2/14/17   Naomi Levine DO   Diabetic Supplies, [place]. misc Diabetic syringes 1 mL - use one daily 2/14/17   Naomi Levine DO   Diabetic Supplies, [place]. misc Diabetic test strips - use three times daily 2/14/17   Naomi Levine DO   Diabetic Supplies, [place]. misc Diabetic lancets - use three times daily 2/14/17   Naomi Levine DO   insulin lispro (HUMALOG) 100 unit/mL injection Check sugars three times a day before meals, if blood sugar is greater than 250 give yourself 8 units of Humalog before eating. Indications: type 2 diabetes mellitus 2/14/17   Naomi Levine DO   aspirin delayed-release 81 mg tablet Take 81 mg by mouth daily.     Historical Provider   famotidine (PEPCID) 20 mg tablet Take 20 mg by mouth two (2) times daily as needed. Historical Provider   guaiFENesin ER (MUCINEX) 600 mg ER tablet Take 600 mg by mouth two (2) times daily as needed for Congestion. Historical Provider   albuterol (PROVENTIL HFA, VENTOLIN HFA, PROAIR HFA) 90 mcg/actuation inhaler Take 2 Puffs by inhalation every four (4) hours as needed for Wheezing. For the next 2 days after hospital discharge, use your inhaler 4 times a day. 3/9/16   Alexis Wong MD   albuterol (PROVENTIL VENTOLIN) 2.5 mg /3 mL (0.083 %) nebulizer solution 2.5 mg by Nebulization route every four (4) hours as needed for Wheezing. Historical Provider   Inhalational Spacing Device (AEROCHAMBER) 1 Each by Does Not Apply route as needed for Cough or Other (COPD exacerbation). 10/2/12   Danitza South,    fluticasone (FLONASE) 50 mcg/actuation nasal spray 2 Sprays by Both Nostrils route daily. Historical Provider   montelukast (SINGULAIR) 10 mg tablet Take 10 mg by mouth nightly. Lauren Allison MD     Allergies   Allergen Reactions    Ancef [Cefazolin] Hives    Contrast Agent [Iodine] Anaphylaxis, Shortness of Breath and Swelling     Needs pre-medication for IV contrast with Benadryl, Solu-Medrol    Fish Containing Products Anaphylaxis     Pt states she had a severe allergic reaction at 7 y/o.  Metformin Other (comments)     Abdominal pain, diarrhea.  Codeine Other (comments)     Altered mental status      Social History   Substance Use Topics    Smoking status: Former Smoker     Packs/day: 1.00     Years: 30.00     Types: Cigarettes     Quit date: 9/6/2006    Smokeless tobacco: Never Used      Comment: 1-1.5 packs per day    Alcohol use No      Review of Systems:  Pertinent items are noted in HPI.   ROS    Objective:   Vital Signs:    Visit Vitals    /75 (BP 1 Location: Left arm, BP Patient Position: At rest)    Pulse 89    Temp 97.8 °F (36.6 °C)    Resp 18    Ht 5' 3\" (1.6 m)    Wt 114.2 kg (251 lb 11.2 oz)  SpO2 91%    Breastfeeding No    BMI 44.59 kg/m2       O2 Device: Nasal cannula   O2 Flow Rate (L/min): 2 l/min   Temp (24hrs), Av.9 °F (36.6 °C), Min:97.8 °F (36.6 °C), Max:98.1 °F (36.7 °C)       Intake/Output:   Last shift:         Last 3 shifts:  1901 -  0700  In: 390 [P.O.:240; I.V.:150]  Out: -     Intake/Output Summary (Last 24 hours) at 18 1040  Last data filed at 18 0200   Gross per 24 hour   Intake              390 ml   Output                0 ml   Net              390 ml     General:  WD, WN, NAD  HEENT:  NCAT. Conjunctiva pink, sclera anicteric. EOMI. Pharynx moist, no erythema. Moist mucous membranes. No supraclavicular, or submandibular lymphadenopathy, mild anterior cervical lymphadenopathy. CV:  RRR, no mumurs. RESP:  Unlabored breathing. CTAB, no wheeze, rales, or rhonchi. ABD:  Soft, nontender, nondistended. Normoactive bowel sounds. No hepatosplenomegaly. No suprapubic tenderness. MS:  No joint deformity or instability. No atrophy. Neuro:  5/5 strength bilateral upper extremities and lower extremities. A&OX3  Ext:  No edema. 2+ radial and dp pulses bilaterally. Skin:  No rashes, lesions, or ulcers. Good turgor       LABS:                Data review: PFT 16             EKG on 18:        ECHO: 16:      Imaging:  I have personally reviewed the patients radiographs and have reviewed the reports:    CXR 18        IMPRESSION: Chronic scarring at the left lateral costophrenic angle. Scarring right middle lobe. Stable mild cardiomegaly.  Hyperinflation      High complexity decision making was performed during the evaluation of this patient at high risk for decompensation with multiple organ involvement     Above mentioned total time spent on reviewing the case/medical record/data/notes/EMR/patient examination/documentation/coordinating care with nurse/consultants, exclusive of procedures with complex decision making performed and > 50% time spent in face to face evaluation.      Louann oMn MD

## 2018-01-24 NOTE — DIABETES MGMT
Diabetes Patient/Family Education Record    Factors That  May Influence Patients Ability  to Learn or  Comply with Recommendations   []   Language barrier    []   Cultural needs   []   Motivation    []   Cognitive limitation    []   Physical   []   Education    []   Physiological factors   []   Hearing/vision/speaking impairment   []   Hoahaoism beliefs    []   Financial factors   []  Other:   [x]  No factors identified at this time.      Person Instructed:   [x]   Patient   []   Family   []  Other     Preference for Learning:   [x]   Verbal   []   Written   []  Demonstration     Level of Comprehension & Competence:    [x]  Good                                      [] Fair                                     []  Poor                             []  Needs Reinforcement   [x]  Teachback completed    Education Component:   [x]  Medication management, including how to administer insulin (if appropriate) and potential medication interactions    [x]  Nutritional management    []  Exercise   []  Signs, symptoms, and treatment of hyperglycemia and hypoglycemia   [] Prevention, recognition and treatment of hyperglycemia and hypoglycemia   [x]  Importance of blood glucose monitoring and how to obtain a blood glucose meter    []  Instruction on use of the blood glucose meter   [x]  Discuss the importance of HbA1C monitoring    []  Sick day guidelines   []  Proper use and disposal of lancets, needles, syringes or insulin pens (if appropriate)   []  Potential long-term complications (retinopathy, kidney disease, neuropathy, foot care)   [x] Information about whom to contact in case of emergency or for more information    [x]  Goal:  Patient/family will demonstrate understanding of Diabetes Self Management Skills by: 01/31/18  Plan for post-discharge education or self-management support:    [] Outpatient class schedule provided            [x] Patient Declined    [] Scheduled for outpatient classes (date) _______       Nicolle Salguero RD, CDE

## 2018-01-24 NOTE — PROGRESS NOTES
Care Management Interventions  Current Support Network: Lives with Spouse, Family Lives Edin Irvin (Daughter) [phone])  Confirm Follow Up Transport: Family    61 yr old female admit with COPD exacerbation. Patient stated that she lives with her daughter Anand Martinez and address verified. Patient stated that she needs some assistance with her ADL's which her daughter helps her with. Patient stated that she has BSC/CPAP/nebulizer/walker/home 02 with [facility] and denies [place] [address]. Patient may benefit from [facility] vs [place] [address] for discharge disposition for COPD exacerbation. Patient verbalized no needs at this time, will remain available.

## 2018-01-24 NOTE — PROGRESS NOTES
Offered patient prescribed flu shot. Patient verbalized \"I don't want to take it today. I want to wait till I feel better\".

## 2018-01-24 NOTE — ED NOTES
I performed a brief evaluation, including history and physical, of the patient here in triage and I have determined that pt will need further treatment and evaluation from the main side ER physician. I have placed initial orders to help in expediting patients care.      January 23, 2018 at 7:02 PM - ANALI Fraga        Visit Vitals    BP (!) 150/94 (BP 1 Location: Left arm, BP Patient Position: At rest)    Pulse 92    Temp 98.1 °F (36.7 °C)    Resp 28    Ht 5' 3\" (1.6 m)    Wt 115.7 kg (255 lb)    SpO2 97%    BMI 45.17 kg/m2

## 2018-01-24 NOTE — DIABETES MGMT
GLYCEMIC CONTROL PLAN OF CARE     Assessment/Recommendations:  Pt is a 61year old female with a past medical history significant for COPD, type 2 diabetes, hypertension, hyperlipidemia, and GERD. Glucose elevated. Pt is receiving Solu-medrol 60 units every 8 hours likely contributing to elevated glucose. Consider increasing Lantus insulin to 20 units every bedtime  Consider addition of prandial Lispro insulin 3 units TID with meals  Advance to the very insulin resistant correctional Lispro scale    Most recent blood glucose values:  1/24/2018 01:50 1/24/2018 07:57 1/24/2018 11:46   337 (H) 269 (H) 345 (H)     Current A1C of 7.3% is equivalent to average blood glucose of 163 mg/dl over the past 2-3 months.     Current hospital diabetes medications:   Lantus insulin 15 units every bedtime  Correctional Lispro insulin 4 times daily ACHS (normal insulin sensitivity)    Home diabetes medications:  Lantus insulin 15 units nightly   Lispro insulin per sliding scale    Diet:  Cardiac, consistent carbohydrate 1800 Kcal    Education:  __x__Refer to Diabetes Education Record             ____Education not indicated at this time      Lisa De Leon RD, CDE

## 2018-01-24 NOTE — ED PROVIDER NOTES
EMERGENCY DEPARTMENT HISTORY AND PHYSICAL EXAM    7:25 PM      Date: 1/23/2018  Patient Name: Levi Irvin    History of Presenting Illness     Chief Complaint   Patient presents with    Cough    Wheezing    Shortness of Breath         History Provided By: Patient    Chief Complaint: SOB  Duration:  1 week  Timing:  Progressive and Worsening  Location: Generalized  Quality: N/A  Severity: N/A  Modifying Factors: Breathing tx, without improvement  Associated Symptoms: productive cough with green sputum, wheezing, orthopnea and swelling in bilateral hands      Additional History (Context): Levi Irvin is a 61 y.o. female with hypertension, COPD and asthma who presents to the ED with c/o progressively worsening SOB for the past week. Associated symptoms include productive cough with green sputum, wheezing, orthopnea and swelling in bilateral hands. Reports she sleeps with CPAP but has been experiencing difficulty sleeping recently as she finds it uncomfortable to breathe through her mouth. Claims she was in the ED 1 week ago for similar sx, prescribed 20x 50 mg Prednisone which relieved her of her symptoms for a few days. Admits she has been using 5-6 breathing treatments, which help her sx '\" for a while. \" Martine Navas her last hospitalization was in 3/2017 for abnl blood gases. Denies smoking or ETOH use. Reports she is on 2L home O2. No other symptoms or concerns were expressed. PCP: Shelia Lee MD    Current Outpatient Prescriptions   Medication Sig Dispense Refill    lisinopril (PRINIVIL, ZESTRIL) 20 mg tablet Take 20 mg by mouth two (2) times a day.  ipratropium (ATROVENT) 0.02 % nebulizer solution Take  by inhalation four (4) times daily as needed.  OXYGEN-AIR DELIVERY SYSTEMS 2 L by Nasal route continuous.  fluticasone-salmeterol (ADVAIR HFA) 230-21 mcg/actuation inhaler Take 2 Puffs by inhalation two (2) times a day.       insulin glargine (LANTUS) 100 unit/mL injection 10 Units by SubCUTAneous route nightly. Indications: type 2 diabetes mellitus 1 Vial 2    Diabetic Supplies, Miscellan. misc Diabetic needles 1/2 inch 31 gauge - 1 injection daily 30 Each 5    Diabetic Supplies, Miscellan. misc Diabetic syringes 1 mL - use one daily 30 Each 5    Diabetic Supplies, Miscellan. misc Diabetic test strips - use three times daily 90 Each 5    Diabetic Supplies, Miscellan. misc Diabetic lancets - use three times daily 90 Each 5    insulin lispro (HUMALOG) 100 unit/mL injection Check sugars three times a day before meals, if blood sugar is greater than 250 give yourself 8 units of Humalog before eating. Indications: type 2 diabetes mellitus 1 Vial 2    aspirin delayed-release 81 mg tablet Take 81 mg by mouth daily.  famotidine (PEPCID) 20 mg tablet Take 20 mg by mouth two (2) times daily as needed.  lansoprazole (PREVACID) 30 mg capsule Take 30 mg by mouth two (2) times a day.  guaiFENesin ER (MUCINEX) 600 mg ER tablet Take 600 mg by mouth two (2) times daily as needed for Congestion.  triamcinolone acetonide (KENALOG) 0.1 % ointment Apply  to affected area two (2) times daily as needed for Skin Irritation. use thin layer      albuterol (PROVENTIL HFA, VENTOLIN HFA, PROAIR HFA) 90 mcg/actuation inhaler Take 2 Puffs by inhalation every four (4) hours as needed for Wheezing. For the next 2 days after hospital discharge, use your inhaler 4 times a day. 1 Inhaler 0    tiotropium (SPIRIVA WITH HANDIHALER) 18 mcg inhalation capsule Take 1 Cap by inhalation daily.  albuterol (PROVENTIL VENTOLIN) 2.5 mg /3 mL (0.083 %) nebulizer solution 2.5 mg by Nebulization route every four (4) hours as needed for Wheezing.  Inhalational Spacing Device (AEROCHAMBER) 1 Each by Does Not Apply route as needed for Cough or Other (COPD exacerbation). 1 Device 0    fluticasone (FLONASE) 50 mcg/actuation nasal spray 2 Sprays by Both Nostrils route daily.         montelukast (SINGULAIR) 10 mg tablet Take 10 mg by mouth nightly. Past History     Past Medical History:  Past Medical History:   Diagnosis Date    Asthma     COPD     Cystocele, midline     Diabetes mellitus ([address]     GERD (gastroesophageal reflux disease)     Hidradenitis suppurativa     Hyperlipidemia     Hypertension     SHRERIE on CPAP     CPAP    Stress incontinence        Past Surgical History:  Past Surgical History:   Procedure Laterality Date    BREAST SURGERY PROCEDURE UNLISTED      Right breast benign tumor removal    HX APPENDECTOMY      HX CHOLECYSTECTOMY      HX DILATION AND CURETTAGE      Dysfunctional uterine bleeding, thought 2/2 fibroids    HX TUBAL LIGATION         Family History:  Family History   Problem Relation Age of Onset    Hypertension Mother     Stroke Mother     Breast Cancer Mother      Bilateral mastectomies    Cancer Mother      ovarian and breast    Heart Failure Mother     Heart Attack Father      2011    Heart Surgery Father      CABG    Heart Failure Father     COPD Sister      Heavy smoker    Cancer Sister      ovarian    Heart Failure Sister     Lung Disease Sister     Asthma Child     Cancer Maternal Aunt      pancreatic    Cancer Maternal Grandfather      stomach       Social History:  Social History   Substance Use Topics    Smoking status: Former Smoker     Packs/day: 1.00     Years: 30.00     Types: Cigarettes     Quit date: 9/6/2006    Smokeless tobacco: Never Used      Comment: 1-1.5 packs per day    Alcohol use No       Allergies: Allergies   Allergen Reactions    Ancef [Cefazolin] Hives    Contrast Agent [Iodine] Anaphylaxis, Shortness of Breath and Swelling     Needs pre-medication for IV contrast with Benadryl, Solu-Medrol    Fish Containing Products Anaphylaxis     Pt states she had a severe allergic reaction at 7 y/o.  Metformin Other (comments)     Abdominal pain, diarrhea.     Codeine Other (comments)     Altered mental status         Review of Systems     Review of Systems   Constitutional: Positive for unexpected weight change. Negative for activity change, fatigue and fever. HENT: Negative for congestion and rhinorrhea. Eyes: Negative for visual disturbance. Respiratory: Positive for cough (productive, green sputum), shortness of breath and wheezing. Cardiovascular: Negative for chest pain and palpitations. Gastrointestinal: Negative for abdominal pain, diarrhea, nausea and vomiting. Genitourinary: Negative for dysuria and hematuria. Musculoskeletal: Negative for back pain. Skin: Negative for rash. Neurological: Negative for dizziness, weakness and light-headedness. Psychiatric/Behavioral: Positive for sleep disturbance. All other systems reviewed and are negative. Physical Exam     Visit Vitals    /60    Pulse 90    Temp 98.1 °F (36.7 °C)    Resp 22    Ht 5' 3\" (1.6 m)    Wt 115.7 kg (255 lb)    SpO2 94%    BMI 45.17 kg/m2       Physical Exam   Constitutional: She is oriented to person, place, and time. She appears well-developed and well-nourished. No distress. HENT:   Head: Normocephalic and atraumatic. Right Ear: External ear normal.   Left Ear: External ear normal.   Nose: Nose normal.   Mouth/Throat: Oropharynx is clear and moist.   Eyes: Conjunctivae and EOM are normal. Pupils are equal, round, and reactive to light. No scleral icterus. Neck: Normal range of motion. Neck supple. No JVD present. No tracheal deviation present. No thyromegaly present. Cardiovascular: Normal rate, regular rhythm, normal heart sounds and intact distal pulses. Exam reveals no gallop and no friction rub. No murmur heard. Pulmonary/Chest: Effort normal. She has wheezes. She exhibits no tenderness. Rhonchi    Abdominal: Soft. Bowel sounds are normal. She exhibits no distension. There is no tenderness. There is no rebound and no guarding. Musculoskeletal: She exhibits edema.  She exhibits no tenderness. Trace edema    Lymphadenopathy:     She has no cervical adenopathy. Neurological: She is alert and oriented to person, place, and time. No cranial nerve deficit. Coordination normal.   No sensory loss, Gait normal, Motor 5/5   Skin: Skin is warm and dry. Psychiatric: She has a normal mood and affect. Her behavior is normal. Judgment and thought content normal.   Nursing note and vitals reviewed. Diagnostic Study Results     Labs -  Recent Results (from the past 12 hour(s))   CBC WITH AUTOMATED DIFF    Collection Time: 01/23/18  7:21 PM   Result Value Ref Range    WBC 7.1 4.6 - 13.2 K/uL    RBC 4.51 4.20 - 5.30 M/uL    HGB 13.8 12.0 - 16.0 g/dL    HCT 39.3 35.0 - 45.0 %    MCV 87.1 74.0 - 97.0 FL    MCH 30.6 24.0 - 34.0 PG    MCHC 35.1 31.0 - 37.0 g/dL    RDW 13.6 11.6 - 14.5 %    PLATELET 770 415 - 301 K/uL    MPV 8.8 (L) 9.2 - 11.8 FL    NEUTROPHILS 63 40 - 73 %    LYMPHOCYTES 30 21 - 52 %    MONOCYTES 6 3 - 10 %    EOSINOPHILS 1 0 - 5 %    BASOPHILS 0 0 - 2 %    ABS. NEUTROPHILS 4.4 1.8 - 8.0 K/UL    ABS. LYMPHOCYTES 2.1 0.9 - 3.6 K/UL    ABS. MONOCYTES 0.4 0.05 - 1.2 K/UL    ABS. EOSINOPHILS 0.1 0.0 - 0.4 K/UL    ABS. BASOPHILS 0.0 0.0 - 0.1 K/UL    DF AUTOMATED     METABOLIC PANEL, COMPREHENSIVE    Collection Time: 01/23/18  7:21 PM   Result Value Ref Range    Sodium 137 136 - 145 mmol/L    Potassium 4.5 3.5 - 5.5 mmol/L    Chloride 100 100 - 108 mmol/L    CO2 32 21 - 32 mmol/L    Anion gap 5 3.0 - 18 mmol/L    Glucose 174 (H) 74 - 99 mg/dL    BUN 11 7.0 - 18 MG/DL    Creatinine 0.88 0.6 - 1.3 MG/DL    BUN/Creatinine ratio 13 12 - 20      GFR est AA >60 >60 ml/min/1.73m2    GFR est non-AA >60 >60 ml/min/1.73m2    Calcium 9.7 8.5 - 10.1 MG/DL    Bilirubin, total 0.7 0.2 - 1.0 MG/DL    ALT (SGPT) 51 13 - 56 U/L    AST (SGOT) 50 (H) 15 - 37 U/L    Alk.  phosphatase 87 45 - 117 U/L    Protein, total 8.2 6.4 - 8.2 g/dL    Albumin 3.6 3.4 - 5.0 g/dL    Globulin 4.6 (H) 2.0 - 4.0 g/dL    A-G Ratio 0.8 0.8 - 1.7     CARDIAC PANEL,(CK, CKMB & TROPONIN)    Collection Time: 01/23/18  7:21 PM   Result Value Ref Range     26 - 192 U/L    CK - MB 1.0 <3.6 ng/ml    CK-MB Index 0.8 0.0 - 4.0 %    Troponin-I, Qt. <0.02 0.0 - 0.045 NG/ML   NT-PRO BNP    Collection Time: 01/23/18  7:21 PM   Result Value Ref Range    NT pro-BNP 22 0 - 900 PG/ML   INFLUENZA A & B AG (RAPID TEST)    Collection Time: 01/23/18  7:30 PM   Result Value Ref Range    Influenza A Antigen NEGATIVE  NEG      Influenza B Antigen NEGATIVE  NEG         Radiologic Studies -   XR CHEST PA LAT    (Results Pending)   CXR (Interpretation by ED Physician):  Left pleural effusion. Atelectasis at the right base. Medical Decision Making   I am the first provider for this patient. I reviewed the vital signs, available nursing notes, past medical history, past surgical history, family history and social history. Vital Signs-Reviewed the patient's vital signs. Pulse Oximetry Analysis -  97% on 2L of O2 via NC, stable    Cardiac Monitor:  Rate: 75  Rhythm:  Normal Sinus Rhythm     EKG: Interpreted by the EP. Time Interpreted: 19:37   Rate: 75   Rhythm: Normal Sinus Rhythm    Interpretation: No STEMI. Records Reviewed: Nursing Notes and Old Medical Records (Time of Review: 7:25 PM)    ED Course: Progress Notes, Reevaluation, and Consults:  Provider Notes (Medical Decision Making): Pt is a 62yo female with a hx of diastolic heart failure, COPD on home oxygen, DM, SHERRIE presents with increasingly productive cough and dyspnea. Pt completed a steroid course without relief after being seeen last week. Suspect her dyspnea is multifactorial.  Will follow cardiac labs, nebs, steroids, then reevaluate. Olivia Riser, DO 8:15 PM    10:08 PM: Reevaluated patient. Patient continues to have SOB, not hypoxic, RR is 25. Patient feels SOB with any movement, states she cannot go to the restroom at home as a result.  Patient has been to the ED in the last 11 days, has failed outpatient therapy. Will discuss admission with PFM. Consult:  Discussed care with MERLE Martinez. Standard discussion; including history of patients chief complaint, available diagnostic results, and treatment course. Will admit patient to medical bed. 10:16 PM, 1/23/2018     For Hospitalized Patients:    1. Hospitalization Decision Time:  The decision to hospitalize the patient was made by Dr. Marina Mart at 10:17 PM on 1/23/2018    2. Aspirin: Aspirin was not given because the patient did not present with a stroke at the time of their Emergency Department evaluation    Diagnosis     Clinical Impression: Acute Bronchitis, Dyspnea, Failed outpatient therapy, COPD with acute exacerbation    Disposition: Admit    Follow-up Information     None           Patient's Medications   Start Taking    No medications on file   Continue Taking    ALBUTEROL (PROVENTIL HFA, VENTOLIN HFA, PROAIR HFA) 90 MCG/ACTUATION INHALER    Take 2 Puffs by inhalation every four (4) hours as needed for Wheezing. For the next 2 days after hospital discharge, use your inhaler 4 times a day. ALBUTEROL (PROVENTIL VENTOLIN) 2.5 MG /3 ML (0.083 %) NEBULIZER SOLUTION    2.5 mg by Nebulization route every four (4) hours as needed for Wheezing. ASPIRIN DELAYED-RELEASE 81 MG TABLET    Take 81 mg by mouth daily. DIABETIC SUPPLIES, MISCELLAN. MISC    Diabetic needles 1/2 inch 31 gauge - 1 injection daily    DIABETIC SUPPLIES, MISCELLAN. MISC    Diabetic syringes 1 mL - use one daily    DIABETIC SUPPLIES, MISCELLAN. MISC    Diabetic test strips - use three times daily    DIABETIC SUPPLIES, MISCELLAN. MISC    Diabetic lancets - use three times daily    FAMOTIDINE (PEPCID) 20 MG TABLET    Take 20 mg by mouth two (2) times daily as needed. FLUTICASONE (FLONASE) 50 MCG/ACTUATION NASAL SPRAY    2 Sprays by Both Nostrils route daily.       FLUTICASONE-SALMETEROL (ADVAIR HFA) 230-21 MCG/ACTUATION INHALER    Take 2 Puffs by inhalation two (2) times a day. GUAIFENESIN ER (MUCINEX) 600 MG ER TABLET    Take 600 mg by mouth two (2) times daily as needed for Congestion. INHALATIONAL SPACING DEVICE (AEROCHAMBER)    1 Each by Does Not Apply route as needed for Cough or Other (COPD exacerbation). INSULIN GLARGINE (LANTUS) 100 UNIT/ML INJECTION    10 Units by SubCUTAneous route nightly. Indications: type 2 diabetes mellitus    INSULIN LISPRO (HUMALOG) 100 UNIT/ML INJECTION    Check sugars three times a day before meals, if blood sugar is greater than 250 give yourself 8 units of Humalog before eating. Indications: type 2 diabetes mellitus    IPRATROPIUM (ATROVENT) 0.02 % NEBULIZER SOLUTION    Take  by inhalation four (4) times daily as needed. LANSOPRAZOLE (PREVACID) 30 MG CAPSULE    Take 30 mg by mouth two (2) times a day. LISINOPRIL (PRINIVIL, ZESTRIL) 20 MG TABLET    Take 20 mg by mouth two (2) times a day. MONTELUKAST (SINGULAIR) 10 MG TABLET    Take 10 mg by mouth nightly. OXYGEN-AIR DELIVERY SYSTEMS    2 L by Nasal route continuous. TIOTROPIUM (SPIRIVA WITH HANDIHALER) 18 MCG INHALATION CAPSULE    Take 1 Cap by inhalation daily. TRIAMCINOLONE ACETONIDE (KENALOG) 0.1 % OINTMENT    Apply  to affected area two (2) times daily as needed for Skin Irritation. use thin layer   These Medications have changed    No medications on file   Stop Taking    No medications on file     _______________________________    Attestations:  Katrin [address] [place] [address] acting as a scribe for and in the presence of Ino Echavarria MD      January 23, 2018 at [facility] PM       Provider Attestation:      I personally performed the services described in the documentation, reviewed the documentation, as recorded by the scribe in my presence, and it accurately and completely records my words and actions.  January 23, 2018 at 7:25 PM - Ino Echavarria MD    _______________________________

## 2018-01-25 LAB
ANION GAP SERPL CALC-SCNC: 7 MMOL/L (ref 3–18)
BASOPHILS # BLD: 0 K/UL (ref 0–0.06)
BASOPHILS NFR BLD: 0 % (ref 0–3)
BUN SERPL-MCNC: 16 MG/DL (ref 7–18)
BUN/CREAT SERPL: 15 (ref 12–20)
CALCIUM SERPL-MCNC: 9.6 MG/DL (ref 8.5–10.1)
CHLORIDE SERPL-SCNC: 101 MMOL/L (ref 100–108)
CO2 SERPL-SCNC: 30 MMOL/L (ref 21–32)
CREAT SERPL-MCNC: 1.05 MG/DL (ref 0.6–1.3)
DIFFERENTIAL METHOD BLD: ABNORMAL
EOSINOPHIL # BLD: 0 K/UL (ref 0–0.4)
EOSINOPHIL NFR BLD: 0 % (ref 0–5)
ERYTHROCYTE [DISTWIDTH] IN BLOOD BY AUTOMATED COUNT: 13.7 % (ref 11.6–14.5)
GLUCOSE BLD STRIP.AUTO-MCNC: 301 MG/DL (ref 70–110)
GLUCOSE BLD STRIP.AUTO-MCNC: 329 MG/DL (ref 70–110)
GLUCOSE BLD STRIP.AUTO-MCNC: 332 MG/DL (ref 70–110)
GLUCOSE BLD STRIP.AUTO-MCNC: 372 MG/DL (ref 70–110)
GLUCOSE SERPL-MCNC: 283 MG/DL (ref 74–99)
HCT VFR BLD AUTO: 36.7 % (ref 35–45)
HGB BLD-MCNC: 12.5 G/DL (ref 12–16)
LYMPHOCYTES # BLD: 1.5 K/UL (ref 0.8–3.5)
LYMPHOCYTES NFR BLD: 8 % (ref 20–51)
MCH RBC QN AUTO: 30.3 PG (ref 24–34)
MCHC RBC AUTO-ENTMCNC: 34.1 G/DL (ref 31–37)
MCV RBC AUTO: 89.1 FL (ref 74–97)
MONOCYTES # BLD: 0.6 K/UL (ref 0–1)
MONOCYTES NFR BLD: 3 % (ref 2–9)
NEUTS BAND NFR BLD MANUAL: 6 % (ref 0–5)
NEUTS SEG # BLD: 16.5 K/UL (ref 1.8–8)
NEUTS SEG NFR BLD: 83 % (ref 42–75)
PLATELET # BLD AUTO: 306 K/UL (ref 135–420)
PLATELET COMMENTS,PCOM: ABNORMAL
PMV BLD AUTO: 9.2 FL (ref 9.2–11.8)
POTASSIUM SERPL-SCNC: 4.2 MMOL/L (ref 3.5–5.5)
RBC # BLD AUTO: 4.12 M/UL (ref 4.2–5.3)
RBC MORPH BLD: ABNORMAL
SODIUM SERPL-SCNC: 138 MMOL/L (ref 136–145)
WBC # BLD AUTO: 18.6 K/UL (ref 4.6–13.2)

## 2018-01-25 PROCEDURE — 74011000250 HC RX REV CODE- 250: Performed by: FAMILY MEDICINE

## 2018-01-25 PROCEDURE — 74011636637 HC RX REV CODE- 636/637: Performed by: EMERGENCY MEDICINE

## 2018-01-25 PROCEDURE — 36415 COLL VENOUS BLD VENIPUNCTURE: CPT | Performed by: FAMILY MEDICINE

## 2018-01-25 PROCEDURE — 97162 PT EVAL MOD COMPLEX 30 MIN: CPT

## 2018-01-25 PROCEDURE — 82962 GLUCOSE BLOOD TEST: CPT

## 2018-01-25 PROCEDURE — 74011636637 HC RX REV CODE- 636/637: Performed by: STUDENT IN AN ORGANIZED HEALTH CARE EDUCATION/TRAINING PROGRAM

## 2018-01-25 PROCEDURE — 85025 COMPLETE CBC W/AUTO DIFF WBC: CPT | Performed by: FAMILY MEDICINE

## 2018-01-25 PROCEDURE — 94660 CPAP INITIATION&MGMT: CPT

## 2018-01-25 PROCEDURE — 65270000029 HC RM PRIVATE

## 2018-01-25 PROCEDURE — 74011250637 HC RX REV CODE- 250/637: Performed by: STUDENT IN AN ORGANIZED HEALTH CARE EDUCATION/TRAINING PROGRAM

## 2018-01-25 PROCEDURE — 94640 AIRWAY INHALATION TREATMENT: CPT

## 2018-01-25 PROCEDURE — 74011250636 HC RX REV CODE- 250/636: Performed by: STUDENT IN AN ORGANIZED HEALTH CARE EDUCATION/TRAINING PROGRAM

## 2018-01-25 PROCEDURE — 80048 BASIC METABOLIC PNL TOTAL CA: CPT | Performed by: FAMILY MEDICINE

## 2018-01-25 PROCEDURE — 97165 OT EVAL LOW COMPLEX 30 MIN: CPT

## 2018-01-25 RX ORDER — ERGOCALCIFEROL 1.25 MG/1
50000 CAPSULE ORAL
Status: DISCONTINUED | OUTPATIENT
Start: 2018-01-25 | End: 2018-01-26 | Stop reason: HOSPADM

## 2018-01-25 RX ORDER — INSULIN GLARGINE 100 [IU]/ML
20 INJECTION, SOLUTION SUBCUTANEOUS
Status: DISCONTINUED | OUTPATIENT
Start: 2018-01-25 | End: 2018-01-26 | Stop reason: HOSPADM

## 2018-01-25 RX ORDER — INSULIN LISPRO 100 [IU]/ML
3 INJECTION, SOLUTION INTRAVENOUS; SUBCUTANEOUS
Status: DISCONTINUED | OUTPATIENT
Start: 2018-01-25 | End: 2018-01-26 | Stop reason: HOSPADM

## 2018-01-25 RX ADMIN — INSULIN GLARGINE 20 UNITS: 100 INJECTION, SOLUTION SUBCUTANEOUS at 21:38

## 2018-01-25 RX ADMIN — INSULIN LISPRO 12 UNITS: 100 INJECTION, SOLUTION INTRAVENOUS; SUBCUTANEOUS at 09:01

## 2018-01-25 RX ADMIN — METHYLPREDNISOLONE SODIUM SUCCINATE 60 MG: 125 INJECTION, POWDER, FOR SOLUTION INTRAMUSCULAR; INTRAVENOUS at 17:47

## 2018-01-25 RX ADMIN — INSULIN LISPRO 15 UNITS: 100 INJECTION, SOLUTION INTRAVENOUS; SUBCUTANEOUS at 12:18

## 2018-01-25 RX ADMIN — LISINOPRIL 20 MG: 20 TABLET ORAL at 09:02

## 2018-01-25 RX ADMIN — IPRATROPIUM BROMIDE AND ALBUTEROL SULFATE 3 ML: 2.5; .5 SOLUTION RESPIRATORY (INHALATION) at 07:10

## 2018-01-25 RX ADMIN — METHYLPREDNISOLONE SODIUM SUCCINATE 60 MG: 125 INJECTION, POWDER, FOR SOLUTION INTRAMUSCULAR; INTRAVENOUS at 09:01

## 2018-01-25 RX ADMIN — IPRATROPIUM BROMIDE AND ALBUTEROL SULFATE 3 ML: 2.5; .5 SOLUTION RESPIRATORY (INHALATION) at 11:33

## 2018-01-25 RX ADMIN — INSULIN LISPRO 12 UNITS: 100 INJECTION, SOLUTION INTRAVENOUS; SUBCUTANEOUS at 21:37

## 2018-01-25 RX ADMIN — INSULIN LISPRO 332 UNITS: 100 INJECTION, SOLUTION INTRAVENOUS; SUBCUTANEOUS at 17:48

## 2018-01-25 RX ADMIN — METHYLPREDNISOLONE SODIUM SUCCINATE 60 MG: 125 INJECTION, POWDER, FOR SOLUTION INTRAMUSCULAR; INTRAVENOUS at 03:38

## 2018-01-25 RX ADMIN — PANTOPRAZOLE SODIUM 40 MG: 40 TABLET, DELAYED RELEASE ORAL at 09:02

## 2018-01-25 RX ADMIN — IPRATROPIUM BROMIDE AND ALBUTEROL SULFATE 3 ML: 2.5; .5 SOLUTION RESPIRATORY (INHALATION) at 03:49

## 2018-01-25 RX ADMIN — INSULIN LISPRO 3 UNITS: 100 INJECTION, SOLUTION INTRAVENOUS; SUBCUTANEOUS at 17:49

## 2018-01-25 RX ADMIN — MONTELUKAST SODIUM 10 MG: 10 TABLET, FILM COATED ORAL at 21:37

## 2018-01-25 RX ADMIN — IPRATROPIUM BROMIDE AND ALBUTEROL SULFATE 3 ML: 2.5; .5 SOLUTION RESPIRATORY (INHALATION) at 16:01

## 2018-01-25 RX ADMIN — ERGOCALCIFEROL 50000 UNITS: 1.25 CAPSULE ORAL at 10:59

## 2018-01-25 RX ADMIN — IPRATROPIUM BROMIDE AND ALBUTEROL SULFATE 3 ML: 2.5; .5 SOLUTION RESPIRATORY (INHALATION) at 20:47

## 2018-01-25 RX ADMIN — ASPIRIN 81 MG: 81 TABLET, COATED ORAL at 09:02

## 2018-01-25 RX ADMIN — IPRATROPIUM BROMIDE AND ALBUTEROL SULFATE 3 ML: 2.5; .5 SOLUTION RESPIRATORY (INHALATION) at 00:21

## 2018-01-25 RX ADMIN — LISINOPRIL 20 MG: 20 TABLET ORAL at 17:48

## 2018-01-25 NOTE — ROUTINE PROCESS
Bedside and Verbal shift change report given to Giovanny Hendrickson RN   (oncoming nurse) by Ernesto Galeano RN (offgoing nurse). Report included the following information SBAR, Kardex, Intake/Output, MAR and Recent Results. 0730 Bedside and Verbal shift change report given to Ernesto Galeano RN (oncoming nurse) by Giovanny Hendrickson RN   (offgoing nurse). Report included the following information SBAR, Kardex, Intake/Output, MAR and Recent Results.

## 2018-01-25 NOTE — PROGRESS NOTES
Intern Progress Note  [facility]       Patient: Bianka Bowie MRN: [id number]  CSN: [id number]    YOB: 1960  Age: 61 y.o. Sex: female    DOA: 1/23/2018 LOS:  LOS: 2 days                    Subjective:     Acute events: Patient reports a dry cough, is having trouble getting sputum out. Still SOB present at exertion. Last night patient slept better with her CPAP machine. Review of Systems   Respiratory: Positive for cough and shortness of breath. Negative for sputum production. Cardiovascular: Negative for chest pain. Gastrointestinal: Negative for abdominal pain. Objective:      Patient Vitals for the past 24 hrs:   Temp Pulse Resp BP SpO2   01/25/18 0725 97.4 °F (36.3 °C) 74 18 110/74 99 %   01/25/18 0711 - - - - 98 %   01/25/18 0310 97.5 °F (36.4 °C) 84 18 113/64 97 %   01/25/18 0049 98.8 °F (37.1 °C) 88 18 122/68 97 %   01/25/18 0023 - - - - 95 %   01/24/18 2029 97.4 °F (36.3 °C) 89 20 163/60 94 %   01/24/18 2020 - - - - 96 %   01/24/18 1607 - - - - 96 %   01/24/18 1510 97.4 °F (36.3 °C) 91 20 155/78 95 %   01/24/18 1158 97.5 °F (36.4 °C) 92 22 127/80 98 %   01/24/18 1138 - - - - 97 %         Intake/Output Summary (Last 24 hours) at 01/25/18 8703  Last data filed at 01/24/18 2230   Gross per 24 hour   Intake              340 ml   Output                0 ml   Net              340 ml       Physical Exam:   General:  Alert and Responsive and in No acute distress. CV:  RRR, no rubs gallops or murmurs. RESP:  Unlabored breathing. Lungs clear to auscultation. No rales,wheezing or rhonchi. Equal expansion bilaterally. ABD:  Soft, nontender, nondistended. No suprapubic tenderness. MS:  No joint deformity or instability. No atrophy. Neuro: alert x o 3  Ext:  No edema. Skin: Dry skin.        Lab/Data Reviewed:  BMP:   Lab Results   Component Value Date/Time     01/25/2018 03:00 AM    K 4.2 01/25/2018 03:00 AM     01/25/2018 03:00 AM CO2 30 01/25/2018 03:00 AM    AGAP 7 01/25/2018 03:00 AM     (H) 01/25/2018 03:00 AM    BUN 16 01/25/2018 03:00 AM    CREA 1.05 01/25/2018 03:00 AM    GFRAA >60 01/25/2018 03:00 AM    GFRNA 54 (L) 01/25/2018 03:00 AM     CBC:   Lab Results   Component Value Date/Time    WBC 18.6 (H) 01/25/2018 03:00 AM    HGB 12.5 01/25/2018 03:00 AM    HCT 36.7 01/25/2018 03:00 AM     01/25/2018 03:00 AM        Scheduled Medications Reviewed:  Current Facility-Administered Medications   Medication Dose Route Frequency    aspirin delayed-release tablet 81 mg  81 mg Oral DAILY    insulin glargine (LANTUS) injection 15 Units  15 Units SubCUTAneous QHS    lisinopril (PRINIVIL, ZESTRIL) tablet 20 mg  20 mg Oral BID    montelukast (SINGULAIR) tablet 10 mg  10 mg Oral QHS    pantoprazole (PROTONIX) tablet 40 mg  40 mg Oral DAILY    insulin lispro (HUMALOG) injection   SubCUTAneous AC&HS    levoFLOXacin (LEVAQUIN) 500 mg in D5W IVPB  500 mg IntraVENous Q24H    albuterol-ipratropium (DUO-NEB) 2.5 MG-0.5 MG/3 ML  3 mL Nebulization Q4H RT    methylPREDNISolone (PF) (SOLU-MEDROL) injection 60 mg  60 mg IntraVENous Q8H    enoxaparin (LOVENOX) injection 40 mg  40 mg SubCUTAneous Q24H         Imaging, microbiology, and EKG/Telemetry:  n/a    Assessment/Plan     61 y. o. female with PMH of COPD, IDDM2, HTN, HLD, GERD, stress incontinence, and SHERRIE on CPAP, now admitted with COPD exacerbation.      COPD Exacerbation w/Chronic Hypoxic Respiratory Failure: Patient is on home chronic supplemental O2 therapy @ 2L NC. She is saturating at 99% with 2 L. Patient had low dose CT chest in 11/2016 demonstrating a small 0.4cm ground glass density. Blood cultures show no growth in 2 days.  Patient has a leukocytosis today probably due to the use of steroids.     - Continue home supplemental O2 therapy, wean appropriately to maintain O2 sats >88%  - Continue duo-nebs q4h per RT  - Continue IV levaquin 500 mg daily   - Continue IV solu-medrol 60 mg every 8 hours  - Continue symbicort 2 puffs BID   - Continue home singulair 10 mg daily qhs  -Pulmonary recs appreciated      IDDM2: Hgb A1c 7.3 (24//2018). Unable to tolerate metformin d/t abdominal pain & diarrhea. On basal insulin + SSI as an outpatient. FBG uncontrolled in the 200-300's.  BG uncontrolled due to the use of steroids.  - Hold home humalog SSI  - Continue home Lantus 15 units qhs  - SSI + Accuchecks ACHS      HTN/HLD/CHFpEF: Last Echo (09/2016) showed EF 60% and grade 1 diastolic dysfunction.   - Continue home ASA 81 mg daily  - Continue home lisinopril 20 mg BID  - Daily BMP  - Monitor VS, per unit routine      GERD(stable): EGD (08/2017) showed hiatal hernia and short-segment Derek's esophagus.  - Continue home protonix 40 mg daily  - Continue home pepcid 20 mg BID prn      SHERRIE on CPAP(stable):  - Continue CPAP qhs, per RT      Diet: Cardiac Regular  DVT Prophylaxis: Lovenox  Code Status: FULL  Point of Contact: Art Valdez, [phone] (ROSANNA: Daughter)      Disposition and anticipated LOS: Carly Howard MD, PGY-1  [address]

## 2018-01-25 NOTE — DIABETES MGMT
GLYCEMIC CONTROL PLAN OF CARE    Assessment/Recommendations:  Blood glucose elevated (likely related to steroids)  Steroids being tapered, currently has order for Solu-medrol 60 mg every 8 hours     Recommend increasing Lantus insulin to 20 units every bedtime   Recommend addition of prandial Lispro insulin 3 units TID with meals  Diabetes education/assessment of home management 1/24/18 (refer to education note)    Most recent blood glucose values:  1/24/2018 16:34 1/24/2018 21:11 1/25/2018 08:12 1/25/2018 11:08   342 (H) 288 (H) 301 (H) 372 (H)     Current A1C of 7.3% is equivalent to average blood glucose of 163 mg/dl over the past 2-3 months.     Current hospital diabetes medications:   Lantus insulin 15 units every bedtime  Correctional Lispro insulin 4 times daily ACHS (very resistant scale)    Previous day's insulin requirements:   15 units of Lantus insulin   35 units of Lispro insulin     Home diabetes medications:  Lantus insulin 15 units nightly   Lispro insulin per sliding scale     Diet:  Cardiac, consistent carbohydrate 1800 Kcal      Education:  _x___Refer to Diabetes Education Record             ____Education not indicated at this time      Nicholas Skelton RD, CDE

## 2018-01-25 NOTE — PROGRESS NOTES
Problem: Mobility Impaired (Adult and Pediatric)  Goal: *Acute Goals and Plan of Care (Insert Text)  Acute goals not established. Patient reports/demonstrates baseline functional mobility, acute skilled PT services not indicated at this time. physical Therapy EVALUATION and Discharge    Patient: Casandra Dominguez (49 y.o. female)  Date: 1/25/2018  Primary Diagnosis: COPD with acute bronchitis ([facility]  Dyspnea  COPD with acute exacerbation (HCC)  COPD exacerbation ([facility]  Precautions: Fall    ASSESSMENT AND RECOMMENDATIONS:  Based on the objective data described below, the patient presents with baseline functional mobility including bed mobility, transfers, ambulation, and general activity tolerance following admission for COPD exacerbation. Patient presents today supine in bed, alert and agreeable to PT evaluation. She transferred to sitting EOB with Justyna, stood with supervision for safety without assistive device. Patient ambulated ~40 ft before requiring seated rest break, ambulated additional 40 ft, took second seated rest break, the completed ambulation back to room. Patient states this is her baseline. She was left seated in recliner with needs met and call bell in reach. Skilled physical therapy is not indicated at this time. Discharge Recommendations: Home Health  Further Equipment Recommendations for Discharge: N/A      SUBJECTIVE:   Patient stated I've been steadily going downhill for the past few months.     OBJECTIVE DATA SUMMARY:     Past Medical History:   Diagnosis Date    Asthma     COPD     Cystocele, midline     Diabetes mellitus ([facility]     GERD (gastroesophageal reflux disease)     Hidradenitis suppurativa     Hyperlipidemia     Hypertension     SHERRIE on CPAP     CPAP    Stress incontinence      Past Surgical History:   Procedure Laterality Date    BREAST SURGERY PROCEDURE UNLISTED      Right breast benign tumor removal    HX APPENDECTOMY      HX CHOLECYSTECTOMY      HX DILATION AND CURETTAGE      Dysfunctional uterine bleeding, thought 2/2 fibroids    HX TUBAL LIGATION       Barriers to Learning/Limitations: None  Compensate with: N/A  Prior Level of Function/Home Situation: Patient lives with daughter on first floor of 2 story home with daughter. She was able to ambulate short household distances with seated rest breaks as needed. Home Situation  Home Environment: Private residence  # Steps to Enter: 2  One/Two Story Residence: Two story, live on 1st floor  # of Interior Steps: 14  Height of Each Step (in): 12 inches  Interior Rails: Right  Lift Chair Available: No  Living Alone: No  Support Systems: Family member(s)  Patient Expects to be Discharged to[de-identified] Private residence  Current DME Used/Available at Home: Walker, rolling  Tub or Shower Type: Tub/Shower combination  Critical Behavior:  Neurologic State: Alert  Psychosocial  Patient Behaviors: Calm; Cooperative  Purposeful Interaction: Yes  Pt Identified Daily Priority: Clinical issues (comment)  Caritas Process: Establish trust;Teaching/learning; Attend basic human needs;Create healing environment  Caring Interventions: Reassure; Therapeutic modalities  Reassure: Therapeutic listening;Caring rounds; Instilling scout and hope  Therapeutic Modalities: Intentional therapeutic touch;Humor  Strength:    Strength:  Within functional limits (BLE)  Tone & Sensation:   Tone: Normal  Sensation: Intact (BLE)   Range Of Motion:  AROM: Within functional limits (BLE)  Functional Mobility:  Bed Mobility:  Rolling: Modified independent  Supine to Sit: Modified independent  Sit to Supine: Modified independent  Scooting: Modified independent  Transfers:  Sit to Stand: Modified independent  Stand to Sit: Modified independent  Balance:   Sitting: Intact  Standing: Intact  Ambulation/Gait Training:  Distance (ft): 120 Feet (ft) (2 seated rest breaks)  Assistive Device:  (None)  Ambulation - Level of Assistance: Supervision  Base of Support: Widened  Speed/Pam: Pace decreased (<100 feet/min)  Pain:  Pain Scale 1: Numeric (0 - 10)  Pain Intensity 1: 0  Activity Tolerance:   Fair/good  Please refer to the flowsheet for vital signs taken during this treatment. After treatment:   [x] Patient left in no apparent distress sitting up in chair  [] Patient left sitting on EOB  [] Patient left in no apparent distress in bed  [] Patient declined to be OOB at this time due to  [x] Call bell left within reach  [x] Nursing notified(Vee)  [] Caregiver present  [] Bed alarm activated  COMMUNICATION/EDUCATION:   [x]         Fall prevention education was provided and the patient/caregiver indicated understanding. [x]         Patient/family have participated as able in goal setting and plan of care. [x]         Patient/family agree to work toward stated goals and plan of care. []         Patient understands intent and goals of therapy, but is neutral about his/her participation. []         Patient is unable to participate in goal setting and plan of care. Thank you for this referral.  Mario Cameron   Time Calculation: 18 mins    Mobility  Current  CI= 1-19%   Goal  CI= 1-19%  D/C  CI= 1-19%. The severity rating is based on the Level of Assistance required for Functional Mobility and ADLs.     Eval Complexity: History: MEDIUM  Complexity : 1-2 comorbidities / personal factors will impact the outcome/ POC Exam:LOW Complexity : 1-2 Standardized tests and measures addressing body structure, function, activity limitation and / or participation in recreation  Presentation: MEDIUM Complexity : Evolving with changing characteristics Overall Complexity:MEDIUM

## 2018-01-25 NOTE — PROGRESS NOTES
Problem: Self Care Deficits Care Plan (Adult)  Goal: *Acute Goals and Plan of Care (Insert Text)  Outcome: Resolved/Met Date Met: 01/25/18  Occupational Therapy EVALUATION/discharge    Patient: Dalila Vazquez (18 y.o. female)  Date: 1/25/2018  Primary Diagnosis: COPD with acute bronchitis ([facility]  Dyspnea  COPD with acute exacerbation (HCC)  COPD exacerbation ([facility]        Precautions:   Fall    ASSESSMENT AND RECOMMENDATIONS:  Based on the objective data described below, the patient presents at baseline level for ADL and related transfers (MOD (I) to INdependent). Uses a fww as needed for longer distances. Pt reports fear of falling in tub shower transfer and great difficulty in managing SOB at home. Provided education re: equipment/tech for safe tub transfer and energy conservation tech. Pt verbalized understanding. Skilled occupational therapy is not indicated at this time. Discharge Recommendations: None  Further Equipment Recommendations for Discharge: transfer bench tub     Barriers to Learning/Limitations: None  Compensate with: visual, verbal, tactile, kinesthetic cues/model     COMPLEXITY     Eval Complexity: History: LOW Complexity : Brief history review ; Examination: LOW Complexity : 1-3 performance deficits relating to physical, cognitive , or psychosocial skils that result in activity limitations and / or participation restrictions ; Decision Making:LOW Complexity : No comorbidities that affect functional and no verbal or physical assistance needed to complete eval tasks  Assessment: low Complexity        G-CODES:     Self Care  Current  CI= 1-19%   Goal  CI= 1-19%   D/C  CI= 1-19%. The severity rating is based on the Level of Assistance required for Functional Mobility and ADLs. SUBJECTIVE:   Patient stated my daughter lives with me.     OBJECTIVE DATA SUMMARY:     Past Medical History:   Diagnosis Date    Asthma     COPD     Cystocele, midline     [facility]     GERD (gastroesophageal reflux disease)     Hidradenitis suppurativa     Hyperlipidemia     Hypertension     SHERRIE on CPAP     CPAP    Stress incontinence      Past Surgical History:   Procedure Laterality Date    BREAST SURGERY PROCEDURE UNLISTED      Right breast benign tumor removal    HX APPENDECTOMY      HX CHOLECYSTECTOMY      HX DILATION AND CURETTAGE      Dysfunctional uterine bleeding, thought 2/2 fibroids    HX TUBAL LIGATION       Prior Level of Function/Home Situation: independent to MOD independent (uses fww as needed)  Home Situation  Home Environment: Private residence  # Steps to Enter: 2  One/Two Story Residence: Two story  # of Interior Steps: 14  Height of Each Step (in): 12 inches  Interior Rails: Right  Lift Chair Available: No  Living Alone: No  Support Systems: Child(kim)  Patient Expects to be Discharged to[de-identified] Private residence  Current DME Used/Available at Home: Oxygen, portable, CPAP, Shower chair, Commode, bedside  Tub or Shower Type: Tub/Shower combination    Cognitive/Behavioral Status:  Neurologic State: Alert  Orientation Level: Oriented X4          Skin: no noted concerns    Edema: no noted concerns    Vision/Perceptual:                                     Coordination:  Coordination: Within functional limits (BUE)            Balance:       Strength:    Strength:  Within functional limits (BUE)                Tone & Sensation:    Tone: Normal (BUE)  Sensation: Intact (BUE)                      Range of Motion:    AROM: Within functional limits (BUE)  PROM: Within functional limits (BUE)                      Functional Mobility and Transfers for ADLs:  Bed Mobility:              Transfers:  Sit to Stand: Independent                Toilet Transfer : Independent                ADL Assessment:  Feeding: Independent    Oral Facial Hygiene/Grooming: Independent    Bathing: Supervision    Upper Body Dressing: Modified independent    Lower Body Dressing: Modified independent    Toileting: Modified independent                ADL Intervention:     Completed toileting MOD (I) with equipment available at home            Pain:  Pt reports 0/10 pain or discomfort prior to treatment.    Pt reports 0/10 pain or discomfort post treatment. Activity Tolerance:   good    Please refer to the flowsheet for vital signs taken during this treatment. After treatment:   []  Patient left in no apparent distress sitting up in chair  [x]  Patient left in no apparent distress in bed  [x]  Call bell left within reach  []  Nursing notified  []  Caregiver present  []  Bed alarm activated    COMMUNICATION/EDUCATION:   Communication/Collaboration:  [x]      Home safety education was provided and the patient/caregiver indicated understanding. [x]      Patient/family have participated as able and agree with findings and recommendations. []      Patient is unable to participate in plan of care at this time.     Beka Head, OT  Time Calculation: 21 mins

## 2018-01-25 NOTE — PROGRESS NOTES
conducted an initial consultation and Spiritual Assessment for Anival Padilla, who is a 61 y.o.,female. Patients Primary Language is: [place]. According to the patients EMR [employer] Affiliation is: Chau [place].     The reason the Patient came to the hospital is:   Patient Active Problem List    Diagnosis Date Noted    Hyperlipidemia 01/24/2018    COPD with acute bronchitis ([facility] 97/68/9416    Diastolic CHF, chronic ([facility] 02/09/2017    Diverticulosis 02/09/2017    COPD exacerbation ([facility] 02/08/2017    Acute exacerbation of COPD with asthma ([facility] 02/08/2017    Obesity, Class III, BMI 40-49.9 (morbid obesity) ([facility] 08/09/2016    Chest pain 08/09/2016    Dyspnea 08/09/2016    COPD with acute exacerbation ([facility] 05/24/2015    COPD (chronic obstructive pulmonary disease) (HCC) 03/27/2015    Allergic rhinitis 03/27/2015    Essential hypertension, benign 09/30/2012    Diabetes mellitus, type 2 ([facility] 09/30/2012    Esophageal reflux 09/30/2012    SHERRIE on CPAP         The  provided the following Interventions:  Initiated a relationship of care and support. Patient said she is feeling better and hopes to be discharged soon. Explored issues of scout, spirituality and/or Faith needs while hospitalized. She is [place] but says she is not affiliated with a Anabaptism group at this time. Listened empathically. She uses a Daniel for her reading. Provided chaplaincy education. Provided information about Spiritual Care Services. Offered assurance of continued prayers on patient's behalf. Chart reviewed. The following outcomes were achieved:  Patient shared some information about their medical narrative and spiritual journey/beliefs. Patient processed feeling about current hospitalization. Patient expressed gratitude for the 's visit.     Assessment:  Patient did not indicate any spiritual or Faith issues which require Spiritual Care Services interventions at this time.   Patient does not have any Anabaptism/cultural needs that will affect patients preferences in health care. Plan:  Chaplains will continue to follow and will provide pastoral care on an as needed or requested basis.  recommends patient page  on duty if patient shows signs of acute spiritual or emotional distress. Franck Coe MDiv.   Board Certified [employer] [phone]

## 2018-01-25 NOTE — PROGRESS NOTES
Physician Assistant Student Progress Note  [facility]    *ATTENTION:  This note has been created by a medical student for educational purposes only. Please do not refer to the content of this note for clinical decision-making, billing, or other purposes. Please see attending physicians note to obtain clinical information on this patient. *     Patient: Bianka Bowie MRN: [id number]  CSN: [id number]    YOB: 1960  Age: 61 y.o. Sex: female    DOA: 1/23/2018 LOS:  LOS: 2 days   BMI: Body mass index is 43.93 kg/(m^2). Subjective:     Patient resting comfortably this morning. Complaining of cough and thick sputum production. She developed leukocytosis and hyperglycemia overnight, likely secondary to steroid use. Will monitor clinically. Breathing at night has improved w/ CPAP. Review of Systems:  Constitutional: Negative for fever, chills and diaphoresis. Eyes: Negative for blurred vision and double vision. Respiratory: Positive for SOB, cough and sputum. Negative for hemoptysis. Cardiovascular: Negative for chest pain or palpitations. Gastrointestinal: Negative for nausea and vomiting. Objective:     Patient Vitals for the past 24 hrs:   Temp Pulse Resp BP SpO2   01/25/18 0310 97.5 °F (36.4 °C) 84 18 113/64 97 %   01/25/18 0049 98.8 °F (37.1 °C) 88 18 122/68 97 %   01/25/18 0023 - - - - 95 %   01/24/18 2029 97.4 °F (36.3 °C) 89 20 163/60 94 %   01/24/18 2020 - - - - 96 %   01/24/18 1607 - - - - 96 %   01/24/18 1510 97.4 °F (36.3 °C) 91 20 155/78 95 %   01/24/18 1158 97.5 °F (36.4 °C) 92 22 127/80 98 %   01/24/18 1138 - - - - 97 %   01/24/18 0729 97.8 °F (36.6 °C) 89 18 130/75 91 %   01/24/18 0712 - - - - 96 %       Intake and Output:  Current Shift: 01/24 1901 - 01/25 0700  In: 340 [P.O.:240; I.V.:100]  Out: -   Last three shifts: 01/23 0701 - 01/24 1900  In: 390 [P.O.:240;  I.V.:150]  Out: -     PHYSICAL EXAM:  General:  Alert and Responsive and in no acute distress. HEENT: Conjunctiva pink, sclera anicteric.  PERRL.  EOMI. CV:  RRR, no murmurs, rubs, or gallops.    RESP: Minimal bilateral expiratory wheeze  ABD:  Soft, nontender, nondistended.  Normoactive bowel sounds.    MS:  No joint deformity or instability.    Neuro: A+Ox3.   Ext:  2+ radial and dp pulses bilaterally.     Lab/Data Reviewed:  Basic Chemistry  Recent Labs      01/25/18   0300 01/24/18 0426 01/23/18 1921   GLU  283*  293*  174*   NA  138  133*  137   K  4.2  4.4  4.5   CL  101  98*  100   CO2  30  25  32   BUN  16  13  11   CREA  1.05  1.09  0.88   CA  9.6  10.0  9.7       Liver Enzymes  Recent Labs      01/23/18 1921   TP  8.2   ALB  3.6   GLOB  4.6*   AGRAT  0.8   SGOT  50*   AP  87     Complete Blood Count with Differential  Recent Labs      01/25/18 0300 01/24/18 0426  01/23/18 1921   WBC  18.6*  10.0  7.1   RBC  4.12*  4.48  4.51   HGB  12.5  13.6  13.8   HCT  36.7  39.3  39.3   PLT  306  293  282   GRANS  83*  92*  63   LYMPH  8*  7*  30   EOS  0  0  1     Microbiology  Recent Labs      01/23/18 2230 01/23/18   2215   CULT  NO GROWTH AFTER 8 HOURS  NO GROWTH AFTER 8 HOURS     Recent Labs      01/23/18 2230 01/23/18   2215   CULT  NO GROWTH AFTER 8 HOURS  NO GROWTH AFTER 8 HOURS       Scheduled Medications Reviewed:  Current Facility-Administered Medications   Medication Dose Route Frequency    aspirin delayed-release tablet 81 mg  81 mg Oral DAILY    insulin glargine (LANTUS) injection 15 Units  15 Units SubCUTAneous QHS    lisinopril (PRINIVIL, ZESTRIL) tablet 20 mg  20 mg Oral BID    montelukast (SINGULAIR) tablet 10 mg  10 mg Oral QHS    pantoprazole (PROTONIX) tablet 40 mg  40 mg Oral DAILY    insulin lispro (HUMALOG) injection   SubCUTAneous AC&HS    levoFLOXacin (LEVAQUIN) 500 mg in D5W IVPB  500 mg IntraVENous Q24H    albuterol-ipratropium (DUO-NEB) 2.5 MG-0.5 MG/3 ML  3 mL Nebulization Q4H RT    methylPREDNISolone (PF) (SOLU-MEDROL) injection 60 mg  60 mg IntraVENous Q8H    enoxaparin (LOVENOX) injection 40 mg  40 mg SubCUTAneous Q24H     Oxygen:  2L Nasal cannula     Assessment/Plan   61 y. o. female with PMH of COPD, IDDM2, HTN, HLD, GERD, stress incontinence, and SHERRIE on CPAP, now admitted with COPD exacerbation.      COPD Exacerbation w/Chronic Hypoxic Respiratory Failure: Patient on is on home chronic supplemental O2 therapy @ 2L NC. Patient was recently treated in outpatient setting for COPD exacerbation in 12/2017. Patient had low dose CT chest in 11/2016 demonstrating a small 0.4cm ground glass density. [facility] 1/24/2018 showed signs of COPD exacerbation. Sees Dr. Maggie Trinidad with Pulmonology as outpatient. Uses CPAP prn for SHERRIE. - Appreciate Pulmonary   - Continue home O2 therapy, wean appropriately to maintain O2 sats >88%  - Continue duo-nebs q3h per RT, space out appropriately  - Continue IV levaquin 500 mg daily for 5 days per Pulmonary   - Continue IV solu-medrol 125 mg q8h  - Continue home advair 2 puffs BID, pharmacy to substitute  - Continue home singulair 10 mg daily qhs   - Influenza A/B negative   - Tylenol PRN for headaches associated with duonebs. -  BCx - NGTD  - F/u CT Chest w/o contrast to assess . 4mm nodule in 2016. Contrast allergy but states she has had one in the past and was pre-medicated. - FU Sputum culture   - Encourage incentive spirometry and acapella use     Leukocytosis: WBC, increased from 10.0 > 18.6 likely d/t steroid use. - Monitoring with CBC     Vitamin D Deficiency: 1/24/2018 showed Vit. D level of 7. Supplementing and correcting as it can reduce the incidence of moderate/severe COPD exacerbations and upper URI.   - Repeat Vitamin D 50,000 I. U once per week for 6 weeks then transition to 800 I.U daily thereafter.   - FU w/ PFM as outpatient        IDDM2 (stable): Admission A1c 7.3 (01/2018) Basal insulin and SSI. Patient states she is well controlled with home regimen.  Avoid metformin as patient has h/o lacic acidosis and diarrhea when using.   - Humalog SSI  - Continue home Lantus 15 units qhs  - SSI + Accuchecks ACHS  - Daily BMP  - Cardiac Regular Diet  - Consult diabetes management, inpatient hyperglycemia likely d/t steroid use. Will monitor and adjust Lantus accordingly.      HTN/HLD/CHFpEF (stable): Last Echo (09/2016) showed EF 60% and grade 1 diastolic dysfunction. Last lipid panel (04/2017): Chol 213, Trigylcerides 171, HDL 44, . BP ranging 130-160s/60-80s while in the ED.  - Continue home ASA 81 mg daily  - Continue home lisinopril 40 mg daily for now. Would follow up outpatient and consider switching to ARB or HCTZ d/t pateints COPD and cough. - Daily BMP  - Monitor VS, per unit routine      GERD (stable): EGD (08/2017) showed hiatal hernia and short-segment Derek's esophagus.  - Continue home protonix 40 mg daily  - Continue home pepcid 20 mg BID prn      SHERRIE on CPAP (stable):  -BiPAP at night, and on PRN basis during day- adjust settings per goal SPO2> 88%. Supplemental O2 via NC when off of BiPAP, titrate flow for goal SPO2> 88%      Activity: Per PT/OT  Diet: Cardiac  DVT Prophylaxis: Lovenox  GI Prophylaxis: Protonix   Code Status: FULL  Disposition: Possible discharge tomorrow   Lines/Tubes: PIV  Point of Contact: Rodger Martini, [phone] ([id number]: Daughter)    ANALI Canseco-S  Physician Assistant Student   [facility] Medicine  January 25, 2018 6:41 AM

## 2018-01-25 NOTE — PROGRESS NOTES
[facility]  Pulmonary, Critical Care, and Sleep Medicine    Name: Ariana Hays MRN: [id number]   : 1960 Hospital: [facility]   Date: 2018         IMPRESSION:   · 1. COPD exacerbation with improved wheezing, mild hypoxia and improved work of breathing - no clinical or radiological evidence of pneumonia and etiology to explain patient's symptoms, negative. Patient with moderate to severe COPD on home o2, 2 Ltrs at baseline (GOLD Class B), MMRC class 2, last exacerbation was last year     · 2. Morbid obesity, SHERRIE with night time desaturations - on CPAP at home with O2 bleed, cont at home settings. · 3. Leukocytosis: Likely steroid induced. · 4. Steroid worsened hyperglycemia: Mx per primary team.  · 5. Hx of hypertension, chronic diastolic CHF- well compensated  · 6. Hx of GERD  · 7. Hx of diverticulosis  · 8. Former nicotine dependence    PLAN:   · 1. BiPAP at night, and on PRN basis during day- adjust settings per goal SPO2> 88%. Supplemental O2 via NC when off of BiPAP, titrate flow for goal SPO2> 88%. · 2. Continue bronchodilators- Brovana and Pulmicort for patient's ease of use- may switch to Advair HFA at DC home  · 3. Steroids - Continue systemic steroids  · 4. Antibiotic choice: Levaquin - for five days. Influenza PCR and antigen negative. Get resp Cx  · 5. Aspiration prevention bundle, head of the bed at 30' all times, pulmonary hygiene care  · 6. Glycemic control as needed while on steroids  · 7. Stress ulcer prophylaxis  · 8. DVT prophylaxis  · 9. OutPatient follow up- Dr. Tiny Wagner  · 10. Complete cessation of smoking      INTERVAL:    : still gets winded while walking to the bathroom. No Dyspnea at rest. No CP/fever. Minimal cough with white sputum    HPI:       Olimpia De Dios is a 61 y. o. female with PMH Severe COPD (follows with Dr. Tiny Wagner, is on Spiriva 2 puffs once daily and Advair 2 puffs twice daily and as needed albuterol), SHERRIE on CPAP, IDDM2, HTN, HLD, GERD, stress incontinence, SHERRIE on CPAP, now presenting with complaint of progressive and worsening SOB.     Patient presented to [facility] ED on 01/12/18 with c/o worsening SOB x3 days. Patient was treated for COPD exacerbation, receiving duo-nebs, magnesium, and steroids in the ED. Patient was then discharged in a stable condition on oral prednisone and resumption of her home controllers.      Patient reported that she finished the oral steroids but still did not feel any better. Patient reported having worsening SOB with any form of ambulation in her house. Patient reported not being able to use the restroom in her home on the 2nd floor, but instead using a bedside commode in her room. Patient also has been unable to do regular ADL's such as cook and clean without becoming severely SOB. Patient had gone from using her home duo-nebulizer treatments 2x a week to about 5-6x a day with temporary resolution in her SOB.      Patient denied fevers/chills, abdominal pain above baseline, n/v/c/d, or lower extremity edema. Patient endorse lower back pain, bilateral side/rib pain, and substernal chest pain all associated with coughing and improved at rest. Patient endorsed productive cough of light green-norberto sputum over the past week.       ED Course: CBC, CMP, Cardiac Panel, NT-pro BNP, Rapid Flu, Blood Cultures, CXR, EKG, Duo-neb x3, IV magnesium 2g x1, IV Solu-medrol 125 mg x1, IV Zofran 4 mg x1      Review of Systems -  Has green sputum for last 1 week. Usually does not produce mucus with mild chronic cough. Denies feet swelling, orthopnea, PNA.  Did not take flu shot yet    Objective:   Vital Signs:    Visit Vitals    /74 (BP 1 Location: Left arm, BP Patient Position: At rest)    Pulse 74    Temp 97.4 °F (36.3 °C)    Resp 18    Ht 5' 3\" (1.6 m)    Wt 112.5 kg (248 lb)    SpO2 99%    Breastfeeding No    BMI 43.93 kg/m2       O2 Device: CPAP mask   O2 Flow Rate (L/min): 2 l/min   Temp (24hrs), Av.7 °F (36.5 °C), Min:97.4 °F (36.3 °C), Max:98.8 °F (37.1 °C)       Intake/Output:   Last shift:         Last 3 shifts:  190 -  0700  In: 730 [P.O.:480; I.V.:250]  Out: -     Intake/Output Summary (Last 24 hours) at 18 0915  Last data filed at 18 2230   Gross per 24 hour   Intake              340 ml   Output                0 ml   Net              340 ml     General:  WD, WN, NAD  HEENT:  NCAT. Conjunctiva pink, sclera anicteric. EOMI. Pharynx moist, no erythema. Moist mucous membranes. No supraclavicular, or submandibular lymphadenopathy, mild anterior cervical lymphadenopathy. CV:  RRR, no mumurs. RESP:  Unlabored breathing. CTAB, distant BS with no wheeze, prolonged exp. phase, rales, or rhonchi. ABD:  Soft, nontender, nondistended. Normoactive bowel sounds.  No hepatosplenomegaly.  No suprapubic tenderness. MS:  No joint deformity or instability. No atrophy. Neuro:  5/5 strength bilateral upper extremities and lower extremities. A&OX3  Ext:  No edema. 2+ radial and dp pulses bilaterally. Skin:  No rashes, lesions, or ulcers. Good turgor      DATA:  Labs:  Recent Labs      18   0300  18   0426  18   WBC  18.6*  10.0  7.1   HGB  12.5  13.6  13.8   HCT  36.7  39.3  39.3   PLT  306  293  282     Recent Labs      18   0300  18   0426  18   192   NA  138  133*  137   K  4.2  4.4  4.5   CL  101  98*  100   CO2  30  25  32   GLU  283*  293*  174*   BUN  16  13  11   CREA  1.05  1.09  0.88   CA  9.6  10.0  9.7   ALB   --    --   3.6   SGOT   --    --   50*   ALT   --    --   51     Data review: PFT 16            EKG on 18:          ECHO: 16:       Imaging:  I have personally reviewed the patients radiographs and have reviewed the reports:     CXR 18          IMPRESSION: Chronic scarring at the left lateral costophrenic angle. Scarring right middle lobe. Stable mild cardiomegaly. Hyperinflation      High complexity decision making was performed during the evaluation of this patient at high risk for decompensation with multiple organ involvement     Above mentioned total time spent on reviewing the case/medical record/data/notes/EMR/patient examination/documentation/coordinating care with nurse/consultants, exclusive of procedures with complex decision making performed and > 50% time spent in face to face evaluation.     Kelle Barkley MD

## 2018-01-25 NOTE — PROGRESS NOTES
Problem: Falls - Risk of  Goal: *Absence of Falls  Document Ricarda Fall Risk and appropriate interventions in the flowsheet.    Outcome: Progressing Towards Goal  Fall Risk Interventions:            Medication Interventions: Patient to call before getting OOB         History of Falls Interventions: Evaluate medications/consider consulting pharmacy

## 2018-01-26 ENCOUNTER — HOME HEALTH ADMISSION (OUTPATIENT)
Dept: HOME HEALTH SERVICES | Facility: HOME HEALTH | Age: 58
End: 2018-01-26

## 2018-01-26 VITALS
RESPIRATION RATE: 18 BRPM | OXYGEN SATURATION: 98 % | HEIGHT: 63 IN | SYSTOLIC BLOOD PRESSURE: 129 MMHG | DIASTOLIC BLOOD PRESSURE: 80 MMHG | TEMPERATURE: 97 F | HEART RATE: 67 BPM | BODY MASS INDEX: 44.51 KG/M2 | WEIGHT: 251.2 LBS

## 2018-01-26 LAB
ANION GAP SERPL CALC-SCNC: 10 MMOL/L (ref 3–18)
BASOPHILS # BLD: 0 K/UL (ref 0–0.06)
BASOPHILS NFR BLD: 0 % (ref 0–3)
BUN SERPL-MCNC: 25 MG/DL (ref 7–18)
BUN/CREAT SERPL: 24 (ref 12–20)
CALCIUM SERPL-MCNC: 9.2 MG/DL (ref 8.5–10.1)
CHLORIDE SERPL-SCNC: 100 MMOL/L (ref 100–108)
CO2 SERPL-SCNC: 28 MMOL/L (ref 21–32)
CREAT SERPL-MCNC: 1.03 MG/DL (ref 0.6–1.3)
DIFFERENTIAL METHOD BLD: ABNORMAL
EOSINOPHIL # BLD: 0 K/UL (ref 0–0.4)
EOSINOPHIL NFR BLD: 0 % (ref 0–5)
ERYTHROCYTE [DISTWIDTH] IN BLOOD BY AUTOMATED COUNT: 13.8 % (ref 11.6–14.5)
GLUCOSE BLD STRIP.AUTO-MCNC: 338 MG/DL (ref 70–110)
GLUCOSE BLD STRIP.AUTO-MCNC: 367 MG/DL (ref 70–110)
GLUCOSE SERPL-MCNC: 294 MG/DL (ref 74–99)
HCT VFR BLD AUTO: 37.4 % (ref 35–45)
HGB BLD-MCNC: 12.3 G/DL (ref 12–16)
LYMPHOCYTES # BLD: 1.4 K/UL (ref 0.8–3.5)
LYMPHOCYTES NFR BLD: 7 % (ref 20–51)
MCH RBC QN AUTO: 29.9 PG (ref 24–34)
MCHC RBC AUTO-ENTMCNC: 32.9 G/DL (ref 31–37)
MCV RBC AUTO: 90.8 FL (ref 74–97)
MONOCYTES # BLD: 0.6 K/UL (ref 0–1)
MONOCYTES NFR BLD: 3 % (ref 2–9)
NEUTS BAND NFR BLD MANUAL: 7 % (ref 0–5)
NEUTS SEG # BLD: 17.7 K/UL (ref 1.8–8)
NEUTS SEG NFR BLD: 83 % (ref 42–75)
PLATELET # BLD AUTO: 317 K/UL (ref 135–420)
PLATELET COMMENTS,PCOM: ABNORMAL
PMV BLD AUTO: 9.5 FL (ref 9.2–11.8)
POTASSIUM SERPL-SCNC: 4.3 MMOL/L (ref 3.5–5.5)
RBC # BLD AUTO: 4.12 M/UL (ref 4.2–5.3)
RBC MORPH BLD: ABNORMAL
SODIUM SERPL-SCNC: 138 MMOL/L (ref 136–145)
WBC # BLD AUTO: 19.7 K/UL (ref 4.6–13.2)

## 2018-01-26 PROCEDURE — 85025 COMPLETE CBC W/AUTO DIFF WBC: CPT | Performed by: FAMILY MEDICINE

## 2018-01-26 PROCEDURE — 94640 AIRWAY INHALATION TREATMENT: CPT

## 2018-01-26 PROCEDURE — 77010033678 HC OXYGEN DAILY

## 2018-01-26 PROCEDURE — 74011000250 HC RX REV CODE- 250: Performed by: FAMILY MEDICINE

## 2018-01-26 PROCEDURE — 74011636637 HC RX REV CODE- 636/637: Performed by: STUDENT IN AN ORGANIZED HEALTH CARE EDUCATION/TRAINING PROGRAM

## 2018-01-26 PROCEDURE — 94660 CPAP INITIATION&MGMT: CPT

## 2018-01-26 PROCEDURE — 82962 GLUCOSE BLOOD TEST: CPT

## 2018-01-26 PROCEDURE — 74011250637 HC RX REV CODE- 250/637: Performed by: STUDENT IN AN ORGANIZED HEALTH CARE EDUCATION/TRAINING PROGRAM

## 2018-01-26 PROCEDURE — 74011250636 HC RX REV CODE- 250/636: Performed by: STUDENT IN AN ORGANIZED HEALTH CARE EDUCATION/TRAINING PROGRAM

## 2018-01-26 PROCEDURE — 36415 COLL VENOUS BLD VENIPUNCTURE: CPT | Performed by: FAMILY MEDICINE

## 2018-01-26 PROCEDURE — 74011250636 HC RX REV CODE- 250/636: Performed by: FAMILY MEDICINE

## 2018-01-26 PROCEDURE — 90471 IMMUNIZATION ADMIN: CPT

## 2018-01-26 PROCEDURE — 80048 BASIC METABOLIC PNL TOTAL CA: CPT | Performed by: FAMILY MEDICINE

## 2018-01-26 PROCEDURE — 90686 IIV4 VACC NO PRSV 0.5 ML IM: CPT | Performed by: FAMILY MEDICINE

## 2018-01-26 PROCEDURE — 74011636637 HC RX REV CODE- 636/637: Performed by: EMERGENCY MEDICINE

## 2018-01-26 RX ORDER — ERGOCALCIFEROL 1.25 MG/1
50000 CAPSULE ORAL
Qty: 4 CAP | Refills: 0 | Status: SHIPPED | OUTPATIENT
Start: 2018-02-01 | End: 2018-03-03

## 2018-01-26 RX ORDER — PREDNISONE 10 MG/1
TABLET ORAL
Qty: 30 TAB | Refills: 0 | Status: SHIPPED | OUTPATIENT
Start: 2018-01-26 | End: 2018-01-30

## 2018-01-26 RX ORDER — PREDNISONE 20 MG/1
20 TABLET ORAL ONCE
Status: COMPLETED | OUTPATIENT
Start: 2018-01-26 | End: 2018-01-26

## 2018-01-26 RX ORDER — LEVOFLOXACIN 500 MG/1
500 TABLET, FILM COATED ORAL DAILY
Qty: 3 TAB | Refills: 0 | Status: SHIPPED | OUTPATIENT
Start: 2018-01-26 | End: 2018-01-29

## 2018-01-26 RX ORDER — PREDNISONE 20 MG/1
40 TABLET ORAL
Status: DISCONTINUED | OUTPATIENT
Start: 2018-01-26 | End: 2018-01-26 | Stop reason: ALTCHOICE

## 2018-01-26 RX ORDER — PREDNISONE 20 MG/1
TABLET ORAL
Qty: 18 TAB | Refills: 0 | Status: ON HOLD | OUTPATIENT
Start: 2018-01-26 | End: 2018-03-13

## 2018-01-26 RX ORDER — PREDNISONE 20 MG/1
20 TABLET ORAL
Status: DISCONTINUED | OUTPATIENT
Start: 2018-01-26 | End: 2018-01-26 | Stop reason: DRUGHIGH

## 2018-01-26 RX ORDER — INSULIN GLARGINE 100 [IU]/ML
20 INJECTION, SOLUTION SUBCUTANEOUS
Qty: 1 VIAL | Refills: 0 | Status: ON HOLD | OUTPATIENT
Start: 2018-01-26 | End: 2018-03-13

## 2018-01-26 RX ORDER — PREDNISONE 20 MG/1
20 TABLET ORAL
Status: DISCONTINUED | OUTPATIENT
Start: 2018-01-27 | End: 2018-01-26

## 2018-01-26 RX ADMIN — INSULIN LISPRO 15 UNITS: 100 INJECTION, SOLUTION INTRAVENOUS; SUBCUTANEOUS at 13:10

## 2018-01-26 RX ADMIN — IPRATROPIUM BROMIDE AND ALBUTEROL SULFATE 3 ML: 2.5; .5 SOLUTION RESPIRATORY (INHALATION) at 11:32

## 2018-01-26 RX ADMIN — ENOXAPARIN SODIUM 40 MG: 100 INJECTION SUBCUTANEOUS at 00:09

## 2018-01-26 RX ADMIN — INSULIN LISPRO 12 UNITS: 100 INJECTION, SOLUTION INTRAVENOUS; SUBCUTANEOUS at 10:19

## 2018-01-26 RX ADMIN — PREDNISONE 20 MG: 20 TABLET ORAL at 16:19

## 2018-01-26 RX ADMIN — INSULIN LISPRO 3 UNITS: 100 INJECTION, SOLUTION INTRAVENOUS; SUBCUTANEOUS at 13:09

## 2018-01-26 RX ADMIN — METHYLPREDNISOLONE SODIUM SUCCINATE 60 MG: 125 INJECTION, POWDER, FOR SOLUTION INTRAMUSCULAR; INTRAVENOUS at 03:10

## 2018-01-26 RX ADMIN — LEVOFLOXACIN 500 MG: 5 INJECTION, SOLUTION INTRAVENOUS at 00:09

## 2018-01-26 RX ADMIN — IPRATROPIUM BROMIDE AND ALBUTEROL SULFATE 3 ML: 2.5; .5 SOLUTION RESPIRATORY (INHALATION) at 16:42

## 2018-01-26 RX ADMIN — PREDNISONE 40 MG: 20 TABLET ORAL at 10:18

## 2018-01-26 RX ADMIN — PANTOPRAZOLE SODIUM 40 MG: 40 TABLET, DELAYED RELEASE ORAL at 10:09

## 2018-01-26 RX ADMIN — INFLUENZA VIRUS VACCINE 0.5 ML: 15; 15; 15; 15 SUSPENSION INTRAMUSCULAR at 16:19

## 2018-01-26 RX ADMIN — LISINOPRIL 20 MG: 20 TABLET ORAL at 10:09

## 2018-01-26 RX ADMIN — IPRATROPIUM BROMIDE AND ALBUTEROL SULFATE 3 ML: 2.5; .5 SOLUTION RESPIRATORY (INHALATION) at 04:35

## 2018-01-26 RX ADMIN — IPRATROPIUM BROMIDE AND ALBUTEROL SULFATE 3 ML: 2.5; .5 SOLUTION RESPIRATORY (INHALATION) at 07:50

## 2018-01-26 RX ADMIN — ASPIRIN 81 MG: 81 TABLET, COATED ORAL at 10:09

## 2018-01-26 RX ADMIN — INSULIN LISPRO 3 UNITS: 100 INJECTION, SOLUTION INTRAVENOUS; SUBCUTANEOUS at 10:18

## 2018-01-26 RX ADMIN — IPRATROPIUM BROMIDE AND ALBUTEROL SULFATE 3 ML: 2.5; .5 SOLUTION RESPIRATORY (INHALATION) at 00:37

## 2018-01-26 NOTE — ROUTINE PROCESS
Received report on pt.from off going RN. Resting quietly in bed on rounds. Remains on bipap. Denies c/o pain. No acute distress noted. Will cont to monitor for any changes in status. 0815 sitting up at side of bed.  Bipap removed and NC on at 2 LPM.

## 2018-01-26 NOTE — CDMP QUERY
Both COPD and asthma are documented on H&P in PMH. Please specify the asthma. Please clarify if this patient is being treated/managed for:    => Asthma   Acuity ( Acute, Exacerbated, Status Asthmaticus, Chronic)   Severity (mild, moderate, severe)   Intermittent or Persistent  => Other Explanation of clinical findings  => Unable to Determine (no explanation of clinical findings)    The medical record reflects the following:  Risk:  -- admitted with COPD EA    Clinical Indicators:  -- H&P:  Past Medical History:  Diagnosis    Asthma     COPD     Treatment:   -- Pulmonary following  -- receiving Prednisone, Mucinex, Albuterol nebs    Please clarify and document your clinical opinion in the progress notes and discharge summary including the definitive and/or presumptive diagnosis, (suspected or probable), related to the above clinical findings. Please include clinical findings supporting your diagnosis. If you DECLINE this query or would like to communicate with [facility], please utilize the \"[facility] message box\" at the TOP of the Progress Note on the right.       Thank you,  [facility], [address] [place] [address]

## 2018-01-26 NOTE — PROGRESS NOTES
Intern Progress Note  [facility]       Patient: Khadijah Hamilton MRN: [id number]  CSN: [id number]    YOB: 1960  Age: 61 y.o. Sex: female    DOA: 1/23/2018 LOS:  LOS: 3 days                    Subjective:     Acute events: No acute events overnight. Patient reports SOB at exertion that has improved. Yesterday walked to the nurses station. Dry cough present. Denies chest pain, abdominal pain. Review of Systems   Respiratory: Positive for cough and shortness of breath. Cardiovascular: Negative for chest pain. Gastrointestinal: Negative for abdominal pain. Objective:      Patient Vitals for the past 24 hrs:   Temp Pulse Resp BP SpO2   01/26/18 0436 - - - - 98 %   01/26/18 0312 97 °F (36.1 °C) 67 18 129/80 98 %   01/26/18 0005 97.9 °F (36.6 °C) 64 16 120/71 99 %   01/25/18 2049 - - - - 96 %   01/25/18 2000 98.1 °F (36.7 °C) 76 18 112/62 96 %   01/25/18 1601 - - - - 98 %   01/25/18 1541 97.5 °F (36.4 °C) 66 18 118/70 97 %   01/25/18 1135 - - - - 98 %   01/25/18 1127 97.4 °F (36.3 °C) 98 18 132/81 97 %   01/25/18 0725 97.4 °F (36.3 °C) 74 18 110/74 99 %         Intake/Output Summary (Last 24 hours) at 01/26/18 0713  Last data filed at 01/26/18 0615   Gross per 24 hour   Intake              220 ml   Output                0 ml   Net              220 ml         Physical Exam:   General:  Alert and Responsive and in No acute distress. CV:  RRR, no rubs gallops or murmurs. RESP:  Unlabored breathing. Lungs clear to auscultation. No rales,wheezing or rhonchi.  Equal expansion bilaterally.    ABD:  Soft, nontender, nondistended.  No suprapubic tenderness.    MS:  No joint deformity or instability.  No atrophy. Neuro: alert x o 3  Ext:  No edema. Skin: Dry skin.       Lab/Data Reviewed:  BMP:   Lab Results   Component Value Date/Time     01/26/2018 03:06 AM    K 4.3 01/26/2018 03:06 AM     01/26/2018 03:06 AM    CO2 28 01/26/2018 03:06 AM    AGAP 10 01/26/2018 03:06 AM     (H) 01/26/2018 03:06 AM    BUN 25 (H) 01/26/2018 03:06 AM    CREA 1.03 01/26/2018 03:06 AM    GFRAA >60 01/26/2018 03:06 AM    GFRNA 55 (L) 01/26/2018 03:06 AM     CBC:   Lab Results   Component Value Date/Time    WBC 19.7 (H) 01/26/2018 03:06 AM    HGB 12.3 01/26/2018 03:06 AM    HCT 37.4 01/26/2018 03:06 AM     01/26/2018 03:06 AM        Scheduled Medications Reviewed:  Current Facility-Administered Medications   Medication Dose Route Frequency    ergocalciferol (ERGOCALCIFEROL) capsule 50,000 Units  50,000 Units Oral Q7D    insulin glargine (LANTUS) injection 20 Units  20 Units SubCUTAneous QHS    insulin lispro (HUMALOG) injection 3 Units  3 Units SubCUTAneous TIDAC    influenza vaccine 2017-18 (3 yrs+)(PF) (FLUZONE QUAD/FLUARIX QUAD) injection 0.5 mL  0.5 mL IntraMUSCular PRIOR TO DISCHARGE    aspirin delayed-release tablet 81 mg  81 mg Oral DAILY    lisinopril (PRINIVIL, ZESTRIL) tablet 20 mg  20 mg Oral BID    montelukast (SINGULAIR) tablet 10 mg  10 mg Oral QHS    pantoprazole (PROTONIX) tablet 40 mg  40 mg Oral DAILY    insulin lispro (HUMALOG) injection   SubCUTAneous AC&HS    levoFLOXacin (LEVAQUIN) 500 mg in D5W IVPB  500 mg IntraVENous Q24H    albuterol-ipratropium (DUO-NEB) 2.5 MG-0.5 MG/3 ML  3 mL Nebulization Q4H RT    methylPREDNISolone (PF) (SOLU-MEDROL) injection 60 mg  60 mg IntraVENous Q8H    enoxaparin (LOVENOX) injection 40 mg  40 mg SubCUTAneous Q24H         Imaging, microbiology, and EKG/Telemetry:  n/a    Assessment/Plan     61 y. o. female with PMH of COPD, IDDM2, HTN, HLD, GERD, stress incontinence, and SHERRIE on CPAP, now admitted with COPD exacerbation.      COPD Exacerbation w/Chronic Hypoxic Respiratory Failure: Patient is on home chronic supplemental O2 therapy @ 2L NC. She is saturating at 98% with 2 L. Patient had low dose CT chest in 11/2016 demonstrating a small 0.4cm ground glass density. Blood cultures show no growth in 3 days. Patient has an increasing trend leukocytosis most probably due to the use of steroids. Patient will be discharged today.      - Continue home supplemental O2 therapy, wean appropriately to maintain O2 sats >88%  - Continue duo-nebs q4h per RT  - Continue IV levaquin 500 mg daily   - Continue IV solu-medrol 60 mg every 8 hours  - Continue symbicort 2 puffs BID   - Continue home singulair 10 mg daily qhs  -Pulmonary recs appreciated  -PT: home health      SHERRIE on CPAP(stable):  - Continue CPAP qhs, per RT    IDDM2: Hgb A1c 7.3 (24//2018). On basal insulin + SSI as an outpatient. FBG uncontrolled in the 200-300's insulin was increased to help decrease these levels. BG uncontrolled due to the use of steroids.  - Hold home humalog SSI  -  home Lantus 15 units qhs was increased to  Lantus 20 units  - SSI + Accuchecks ACHS  -Lispro 3 units TID before meals    Vitamin D Deficiency: Vit. D level was decreased on 1/24/18. Supplementing and correcting as it can reduce the incidence of moderate/severe COPD exacerbations and upper URI.   - Ergocalciferol 50,000 units every 7 days    HTN/HLD/CHFpEF: Last Echo (09/2016) showed EF 60% and grade 1 diastolic dysfunction. BP controlled.   - Continue home ASA 81 mg daily  - Continue home lisinopril 20 mg BID  - Daily BMP  - Monitor VS, per unit routine      GERD(stable): EGD (08/2017) showed hiatal hernia and short-segment Derek's esophagus.  - Continue home protonix 40 mg daily  - Continue home pepcid 20 mg BID prn      Diet: Cardiac Regular  DVT Prophylaxis: Lovenox  Code Status: FULL  Point of Contact: David Reevesy, [phone] ([id number]: Daughter)      Disposition and anticipated LOS: Roman Vera MD, PGY-1  [facility]

## 2018-01-26 NOTE — PROGRESS NOTES
Care Management Interventions  Physical Therapy Consult: Yes  Occupational Therapy Consult: Yes  Current Support Network: Relative's Home  Confirm Follow Up Transport: Family  Plan discussed with Pt/Family/Caregiver: Yes    Orders for discharge to home today with [facility] for COPD, referral sent [facility] Link/called to Nikos Merchant and they will follow up with patient. Patient verbalized no further needs at this time.

## 2018-01-26 NOTE — PROGRESS NOTES
[facility]  Pulmonary, Critical Care, and Sleep Medicine    Name: Bethanie Montero MRN: [id number]   : 1960 Hospital: [address] [place] [address]    Date: 2018        IMPRESSION:   · 1. COPD exacerbation with improved wheezing, mild hypoxia and improved work of breathing - no clinical or radiological evidence of pneumonia and etiology to explain patient's symptoms, negative. Patient with moderate to severe COPD on home o2, 2 Ltrs at baseline (GOLD Class B), MMRC class 2, last exacerbation was last year      · 2. Morbid obesity, SHERRIE with night time desaturations - on CPAP at home with O2 bleed, cont at home settings.     · 3. Leukocytosis: Likely steroid induced- improved  · 4. Steroid worsened hyperglycemia: Mx per primary team.  · 5. Hx of hypertension, chronic diastolic CHF- well compensated  · 6. Hx of GERD  · 7. Hx of diverticulosis  · 8. Former nicotine dependence    PLAN:   · 1. BiPAP at night, and on PRN basis during day- adjust settings per goal SPO2> 88%. Supplemental O2 via NC when off of BiPAP, titrate flow for goal SPO2> 88%. · 2.  Continue bronchodilators- Brovana and Pulmicort for patient's ease of use- may switch to Advair HFA at DC home  · 3. Steroids - Continue systemic steroids  · 4. Antibiotic choice: Levaquin - for five days. Influenza PCR and antigen negative. Get resp Cx  · 5. Aspiration prevention bundle, head of the bed at 30' all times, pulmonary hygiene care  · 6. Glycemic control as needed while on steroids  · 7. Stress ulcer prophylaxis  · 8. DVT prophylaxis  · 9. OutPatient follow up- Dr. Darrick Bloch  · 10. Complete cessation of smoking    CAN BE D/radha to home from pulmonary standpoint.       INTERVAL:  : still gets winded while walking to the bathroom. No Dyspnea at rest. No CP/fever. Minimal cough with white sputum  :  No acute events overnight. Patient reports SOB at exertion that has improved. Yesterday walked to the nurses station.  Dry cough present. Denies chest pain, abdominal pain.     HPI:       Lauri De Dios is a 61 y. o. female with PMH Severe COPD (follows with Dr. Ana Aguirre, is on Spiriva 2 puffs once daily and Advair 2 puffs twice daily and as needed albuterol), SHERRIE on CPAP, IDDM2, HTN, HLD, GERD, stress incontinence, SHERRIE on CPAP, now presenting with complaint of progressive and worsening SOB.     Patient presented to [facility] ED on 01/12/18 with c/o worsening SOB x3 days. Patient was treated for COPD exacerbation, receiving duo-nebs, magnesium, and steroids in the ED. Patient was then discharged in a stable condition on oral prednisone and resumption of her home controllers.      Patient reported that she finished the oral steroids but still did not feel any better. Patient reported having worsening SOB with any form of ambulation in her house. Patient reported not being able to use the restroom in her home on the 2nd floor, but instead using a bedside commode in her room. Patient also has been unable to do regular ADL's such as cook and clean without becoming severely SOB. Patient had gone from using her home duo-nebulizer treatments 2x a week to about 5-6x a day with temporary resolution in her SOB.     Patient denied fevers/chills, abdominal pain above baseline, n/v/c/d, or lower extremity edema. Patient endorse lower back pain, bilateral side/rib pain, and substernal chest pain all associated with coughing and improved at rest. Patient endorsed productive cough of light green-norberto sputum over the past week.       ED Course: CBC, CMP, Cardiac Panel, NT-pro BNP, Rapid Flu, Blood Cultures, CXR, EKG, Duo-neb x3, IV magnesium 2g x1, IV Solu-medrol 125 mg x1, IV Zofran 4 mg x1      Review of Systems -  Has green sputum for last 1 week. Usually does not produce mucus with mild chronic cough. Denies feet swelling, orthopnea, PNA.  Did not take flu shot yet    Objective:   Vital Signs:    Visit Vitals    /80 (BP 1 Location: Right arm, BP Patient Position: At rest)    Pulse 67    Temp 97 °F (36.1 °C)    Resp 18    Ht 5' 3\" (1.6 m)    Wt 113.9 kg (251 lb 3.2 oz)    SpO2 98%    Breastfeeding No    BMI 44.5 kg/m2       O2 Device: Nasal cannula   O2 Flow Rate (L/min): 2 l/min   Temp (24hrs), Av.6 °F (36.4 °C), Min:97 °F (36.1 °C), Max:98.1 °F (36.7 °C)       Intake/Output:   Last shift:         Last 3 shifts: 1901 -  07  In: 560 [P.O.:360; I.V.:200]  Out: -     Intake/Output Summary (Last 24 hours) at 18 09  Last data filed at 18 0615   Gross per 24 hour   Intake              220 ml   Output                0 ml   Net              220 ml      General:  WD, WN, NAD  HEENT:  NCAT. Conjunctiva pink, sclera anicteric. EOMI. Pharynx moist, no erythema. Moist mucous membranes. No supraclavicular, or submandibular lymphadenopathy, mild anterior cervical lymphadenopathy. CV:  RRR, no mumurs. RESP:  Unlabored breathing. CTAB, GOOD AIR ENTRY B/L, with no wheeze, rales, or rhonchi. ABD:  Soft, nontender, nondistended. Normoactive bowel sounds.  No hepatosplenomegaly.  No suprapubic tenderness. MS:  No joint deformity or instability. No atrophy. Neuro:  5/5 strength bilateral upper extremities and lower extremities. A&OX3  Ext:  No edema. 2+ radial and dp pulses bilaterally. Skin:  No rashes, lesions, or ulcers.  Good turgor       DATA:  Labs:  Recent Labs      18   0306  18   0300  18   0426   WBC  19.7*  18.6*  10.0   HGB  12.3  12.5  13.6   HCT  37.4  36.7  39.3   PLT  317  306  293     Recent Labs      18   0306  18   0300  18   0426  18   1921   NA  138  138  133*  137   K  4.3  4.2  4.4  4.5   CL  100  101  98*  100   CO2  28  30  25  32   GLU  294*  283*  293*  174*   BUN  25*  16  13  11   CREA  1.03  1.05  1.09  0.88   CA  9.2  9.6  10.0  9.7   ALB   --    --    --   3.6   SGOT   --    --    --   50*   ALT   --    --    --   51     Imaging:  [x]I have personally reviewed the patients radiographs  []Radiographs reviewed with radiologist   []No change from prior, tubes and lines in adequate position  []Improved   []Worsening    High complexity decision making was performed during the evaluation of this patient at high risk for decompensation with multiple organ involvement     Above mentioned total time spent on reviewing the case/medical record/data/notes/EMR/patient examination/documentation/coordinating care with nurse/consultants, exclusive of procedures with complex decision making performed and > 50% time spent in face to face evaluation.     Louann Mon MD

## 2018-01-26 NOTE — PROGRESS NOTES
Pt given discharge instructions and prescriptions. Expressed understanding. Discharged home transported to exit per w/c without oxygen. pts sts\" I dont have any in the car and dont need it to go downstairs, I will be fine without it for 5 mins and will put it on when I get home\". Pt drove self home. Denied need to have someone else drive her home. Teenage/young adult in car with pt for ride home. No distress noted at time of transport.

## 2018-01-26 NOTE — HOME CARE
[facility] received referral for [facility] for COPD - discharge orders noted. This nurse verified address and contact number - patient states she lives with her daughter Sana Frost. DME: per patient, she has home O2 thru [employer], glucometer and supplies, nebulizer, bedside commode, front wheeled walker and a CPAP. Referral called to central intake for completion and visit scheduling - AMRITA Larkin LPN

## 2018-01-26 NOTE — DISCHARGE INSTRUCTIONS
Bronchitis: Care Instructions  Your Care Instructions    Bronchitis is inflammation of the bronchial tubes, which carry air to the lungs. The tubes swell and produce mucus, or phlegm. The mucus and inflamed bronchial tubes make you cough. You may have trouble breathing. Most cases of bronchitis are caused by viruses like those that cause colds. Antibiotics usually do not help and they may be harmful. Bronchitis usually develops rapidly and lasts about 2 to 3 weeks in otherwise healthy people. Follow-up care is a key part of your treatment and safety. Be sure to make and go to all appointments, and call your doctor if you are having problems. It's also a good idea to know your test results and keep a list of the medicines you take. How can you care for yourself at home? · Take all medicines exactly as prescribed. Call your doctor if you think you are having a problem with your medicine. · Get some extra rest.  · Take an over-the-counter pain medicine, such as acetaminophen (Tylenol), ibuprofen (Advil, Motrin), or naproxen (Aleve) to reduce fever and relieve body aches. Read and follow all instructions on the label. · Do not take two or more pain medicines at the same time unless the doctor told you to. Many pain medicines have acetaminophen, which is Tylenol. Too much acetaminophen (Tylenol) can be harmful. · Take an over-the-counter cough medicine that contains dextromethorphan to help quiet a dry, hacking cough so that you can sleep. Avoid cough medicines that have more than one active ingredient. Read and follow all instructions on the label. · Breathe moist air from a humidifier, hot shower, or sink filled with hot water. The heat and moisture will thin mucus so you can cough it out. · Do not smoke. Smoking can make bronchitis worse. If you need help quitting, talk to your doctor about stop-smoking programs and medicines. These can increase your chances of quitting for good.   When should you call for help? Call 911 anytime you think you may need emergency care. For example, call if:  ? · You have severe trouble breathing. ?Call your doctor now or seek immediate medical care if:  ? · You have new or worse trouble breathing. ? · You cough up dark brown or bloody mucus (sputum). ? · You have a new or higher fever. ? · You have a new rash. ? Watch closely for changes in your health, and be sure to contact your doctor if:  ? · You cough more deeply or more often, especially if you notice more mucus or a change in the color of your mucus. ? · You are not getting better as expected. Where can you learn more? Go to http://etelvina-olivier.info/. Enter H333 in the search box to learn more about \"Bronchitis: Care Instructions. \"  Current as of: May 12, 2017  Content Version: 11.4  © 8960-2282 [employer]. Care instructions adapted under license by [employer] (which disclaims liability or warranty for this information). If you have questions about a medical condition or this instruction, always ask your healthcare professional. [place] [address] any warranty or liability for your use of this information. DISCHARGE SUMMARY from Nurse    PATIENT INSTRUCTIONS:    After general anesthesia or intravenous sedation, for 24 hours or while taking prescription Narcotics:  · Limit your activities  · Do not drive and operate hazardous machinery  · Do not make important personal or business decisions  · Do  not drink alcoholic beverages  · If you have not urinated within 8 hours after discharge, please contact your surgeon on call.     Report the following to your surgeon:  · Excessive pain, swelling, redness or odor of or around the surgical area  · Temperature over 100.5  · Nausea and vomiting lasting longer than 4 hours or if unable to take medications  · Any signs of decreased circulation or nerve impairment to extremity: change in color, persistent numbness, tingling, coldness or increase pain  · Any questions    What to do at Home:  Recommended activity: Activity as tolerated,     If you experience any of the following symptoms increased shortness of breath, chest pain, fevers over 100.5 increased weakness, dizziness, please follow up with PCP or call 911 if an emergency. *  Please give a list of your current medications to your Primary Care Provider. *  Please update this list whenever your medications are discontinued, doses are      changed, or new medications (including over-the-counter products) are added. *  Please carry medication information at all times in case of emergency situations. These are general instructions for a healthy lifestyle:    No smoking/ No tobacco products/ Avoid exposure to second hand smoke  Surgeon General's Warning:  Quitting smoking now greatly reduces serious risk to your health. Obesity, smoking, and sedentary lifestyle greatly increases your risk for illness    A healthy diet, regular physical exercise & weight monitoring are important for maintaining a healthy lifestyle    You may be retaining fluid if you have a history of heart failure or if you experience any of the following symptoms:  Weight gain of 3 pounds or more overnight or 5 pounds in a week, increased swelling in our hands or feet or shortness of breath while lying flat in bed. Please call your doctor as soon as you notice any of these symptoms; do not wait until your next office visit. Recognize signs and symptoms of STROKE:    F-face looks uneven    A-arms unable to move or move unevenly    S-speech slurred or non-existent    T-time-call 911 as soon as signs and symptoms begin-DO NOT go       Back to bed or wait to see if you get better-TIME IS BRAIN. Warning Signs of HEART ATTACK     Call 911 if you have these symptoms:   Chest discomfort.  Most heart attacks involve discomfort in the center of the chest that lasts more than a few minutes, or that goes away and comes back. It can feel like uncomfortable pressure, squeezing, fullness, or pain.  Discomfort in other areas of the upper body. Symptoms can include pain or discomfort in one or both arms, the back, neck, jaw, or stomach.  Shortness of breath with or without chest discomfort.  Other signs may include breaking out in a cold sweat, nausea, or lightheadedness. Don't wait more than five minutes to call 911 - MINUTES MATTER! Fast action can save your life. Calling 911 is almost always the fastest way to get lifesaving treatment. Emergency Medical Services staff can begin treatment when they arrive -- up to an hour sooner than if someone gets to the hospital by car. The discharge information has been reviewed with the patient. The patient verbalized understanding. Discharge medications reviewed with the patient and appropriate educational materials and side effects teaching were provided. [facility] Activation    Thank you for requesting access to [facility]. Please follow the instructions below to securely access and download your online medical record. [facility] allows you to send messages to your doctor, view your test results, renew your prescriptions, schedule appointments, and more. How Do I Sign Up? 1. In your internet browser, go to [email or web address]. 2. Click on the First Time User? Click Here link in the Sign In box. You will see the New Member Sign Up page. 3. Enter your [facility] Access Code exactly as it appears below. You will not need to use this code after youve completed the sign-up process. If you do not sign up before the expiration date, you must request a new code. [facility] Access Code: [email or web address]  Expires: 2018  9:27 PM (This is the date your [facility] access code will )    4. Enter the last four digits of your Social Security Number (xxxx) and Date of Birth (mm/dd/yyyy) as indicated and click Submit.  You will be taken to the next sign-up page. 5. Create a Cynvect ID. This will be your [facility] login ID and cannot be changed, so think of one that is secure and easy to remember. 6. Create a [facility] password. You can change your password at any time. 7. Enter your Password Reset Question and Answer. This can be used at a later time if you forget your password. 8. Enter your e-mail address. You will receive e-mail notification when new information is available in [address] 9. Click Sign Up. You can now view and download portions of your medical record. 10. Click the Download Summary menu link to download a portable copy of your medical information. Additional Information    If you have questions, please visit the Frequently Asked Questions section of the [facility] website at [email or web address] Remember, [facility] is NOT to be used for urgent needs. For medical emergencies, dial 911.     Patient armband removed and shredded    ___________________________________________________________________________________________________________________________________

## 2018-01-29 ENCOUNTER — TELEPHONE (OUTPATIENT)
Dept: CASE MANAGEMENT | Age: 58
End: 2018-01-29

## 2018-01-29 LAB
BACTERIA SPEC CULT: NORMAL
BACTERIA SPEC CULT: NORMAL
SERVICE CMNT-IMP: NORMAL
SERVICE CMNT-IMP: NORMAL

## 2018-01-29 NOTE — DISCHARGE SUMMARY
Discharge Summary  [facility]      Patient: Olivia Mccollum Age: 61 y.o. Sex: female  : 1960    MRN: [id number]      DOA: 2018      Discharge Date: 2018      Attending: Swathi Gutierrez      PCP: Sonny Rogers MD        ================================================================    Reason for Admission:   COPD with acute bronchitis   Dyspnea  COPD exacerbation     Discharge Diagnoses:   COPD Exacerbation with Chronic Hypoxic Respiratory Failure(resolved)  SHERRIE(stable)  Diabetes Mellitus type 2(uncontrolled)  Vitamin D Deficiency(stable)  Hypertension(stable)  Hyperlipidemia(stable)  CHFpEF(stable)  GERD(stable)    Important notes to PCP/ follow-up studies and evaluations   -Patient's Lantus was increased from 15 to 20 units due to her uncontrolled blood sugars.  -Prednisone 20 mg taper: Take 3 tablets for 3 days, take 2 tablets for 3 days, take 1 tablet for 3 days.  -Follow up with Dr. Meridee Boxer as an out patient. Pending labs and studies:  None    Operative Procedures:   None    Discharge Medications:     Discharge Medication List as of 2018  5:27 PM      START taking these medications    Details   levoFLOXacin (LEVAQUIN) 500 mg tablet Take 1 Tab by mouth daily for 3 days. , Print, Disp-3 Tab, R-0      ergocalciferol (ERGOCALCIFEROL) 50,000 unit capsule Take 1 Cap by mouth every seven (7) days for 30 days. Indications: Vitamin D Deficiency, Print, Disp-4 Cap, R-0      !! predniSONE (DELTASONE) 20 mg tablet Take 3 tablets for 3 days, take 2 tablets for 3 days, take 1 tablet for 3 days, Print, Disp-18 Tab, R-0            CONTINUE these medications which have CHANGED    Details   insulin glargine (LANTUS) 100 unit/mL injection 20 Units by SubCUTAneous route nightly.  Indications: type 2 diabetes mellitus, Print, Disp-1 Vial, R-0         CONTINUE these medications which have NOT CHANGED    Details   pantoprazole (PROTONIX) 40 mg tablet Take 40 mg by mouth daily. , Historical Med      neomycin-polymyxin-hc (CORTISPORIN) 3.5-10,000-10 mg-unit-mg/mL ophthalmic suspension Administer 1 Drop to both eyes four (4) times daily as needed., Historical Med      lisinopril (PRINIVIL, ZESTRIL) 20 mg tablet Take 20 mg by mouth two (2) times a day., Historical Med      ipratropium (ATROVENT) 0.02 % nebulizer solution Take  by inhalation four (4) times daily as needed., Historical Med      OXYGEN-AIR DELIVERY SYSTEMS 2 L by Nasal route continuous. , Historical Med      fluticasone-salmeterol (ADVAIR HFA) 230-21 mcg/actuation inhaler Take 2 Puffs by inhalation two (2) times a day., Historical Med      !! Diabetic Supplies, Henri Michelle. misc Diabetic needles 1/2 inch 31 gauge - 1 injection daily, Print, Disp-30 Each, R-5      !! Diabetic Supplies, Henri Michelle. misc Diabetic syringes 1 mL - use one daily, Print, Disp-30 Each, R-5      !! Diabetic Supplies, Henri Michelle. misc Diabetic test strips - use three times daily, Print, Disp-90 Each, R-5      !! Diabetic Supplies, Henri Michelle. misc Diabetic lancets - use three times daily, Print, Disp-90 Each, R-5      insulin lispro (HUMALOG) 100 unit/mL injection Check sugars three times a day before meals, if blood sugar is greater than 250 give yourself 8 units of Humalog before eating. Indications: type 2 diabetes mellitus, Print, Disp-1 Vial, R-2      aspirin delayed-release 81 mg tablet Take 81 mg by mouth daily. , Historical Med      famotidine (PEPCID) 20 mg tablet Take 20 mg by mouth two (2) times daily as needed., Historical Med      guaiFENesin ER (MUCINEX) 600 mg ER tablet Take 600 mg by mouth two (2) times daily as needed for Congestion. , Historical Med      albuterol (PROVENTIL HFA, VENTOLIN HFA, PROAIR HFA) 90 mcg/actuation inhaler Take 2 Puffs by inhalation every four (4) hours as needed for Wheezing.  For the next 2 days after hospital discharge, use your inhaler 4 times a day., Print, Disp-1 Inhaler, R-0      albuterol (PROVENTIL VENTOLIN) 2.5 mg /3 mL (0.083 %) nebulizer solution 2.5 mg by Nebulization route every four (4) hours as needed for Wheezing., Historical Med      Inhalational Spacing Device (AEROCHAMBER) 1 Each by Does Not Apply route as needed for Cough or Other (COPD exacerbation). , Normal, Disp-1 Device, R-0      fluticasone (FLONASE) 50 mcg/actuation nasal spray 2 Sprays by Both Nostrils route daily. , Historical Med      montelukast (SINGULAIR) 10 mg tablet Take 10 mg by mouth nightly., Historical Med       !! - Potential duplicate medications found. Please discuss with provider. Disposition: Home with Home Health    Consultants:    -Doe Yang MD,pulmonary disease    Brief Hospital Course (including pertinent history and physical findings)    Mansfield Crigler is a 61 y. o. female with PMH of COPD, IDDM2, HTN, HLD, GERD, stress incontinence, and SHERRIE on CPAP, who was admitted for COPD exacerbation. COPD Exacerbation with Chronic Hypoxic Respiratory Failure:   Patient presented with the complaint of SOB with exertion, productive cough with green sputum, wheezing and orthopnea. Patient is on home chronic supplemental O2 therapy at  2L NC. On admission the CXR showed hyperinflation suggestive of COPD exacerbation. Influenza PCR and antigen were negative. Blood cultures showed no growth. She was treated with duo-nebs, levaquin,solu-medrol and Symbicort. On discharge day patient's SOB had resolved. SHERRIE:  Patient was treated with CPAP qhs. Diabetes Mellitus type 2:  Patient's Hgb A1c done on 24//2018 was 7.3. Patient was being treated with steroids and that was increasing her fasting blood sugars levels. Patient has a home humalog SSI and  Lantus 15 units qhs. She was discharged with no changes to the SSI and with an increase of Lantus from 15 to 20 units due to her uncontrolled blood sugars.     Vitamin D Deficiency:  Patient was found to have decreased Vitamin D levels and was started on Ergocalciferol 50,000 units every 7 days for that. Supplementing and correcting as it can reduce the incidence of moderate/severe COPD exacerbations and upper URI. CHFpEF/Hypertension/Hyperlipidemia:   Patient was continued on her home medications on her hospital stay. On discharge there was no change in medications. GERD:  Patient was continued on her home medications on her hospital stay. On discharge there was no change in medications. Summarized key findings and results (labs, imaging studies, ECHO, cardiac cath, endoscopies, etc):    CXR 1/23/18  IMPRESSION:     Chronic scarring at the left lateral costophrenic angle. Scarring right middle lobe. Stable mild cardiomegaly. Hyperinflation    CBC w/Diff    Lab Results   Component Value Date/Time    WBC 19.7 01/26/2018 03:06 AM    HGB 12.3 01/26/2018 03:06 AM    HCT 37.4 01/26/2018 03:06 AM    PLATELET 673 00/71/5888 03:06 AM    MCV 90.8 01/26/2018 03:06 AM           Chemistry    Lab Results   Component Value Date/Time    Sodium 138 01/26/2018 03:06 AM    Potassium 4.3 01/26/2018 03:06 AM    Chloride 100 01/26/2018 03:06 AM    CO2 28 01/26/2018 03:06 AM    Anion gap 10 01/26/2018 03:06 AM    Glucose 294 01/26/2018 03:06 AM    BUN 25 01/26/2018 03:06 AM    Creatinine 1.03 01/26/2018 03:06 AM    BUN/Creatinine ratio 24 01/26/2018 03:06 AM    GFR est AA >60 01/26/2018 03:06 AM    GFR est non-AA 55 01/26/2018 03:06 AM    Calcium 9.2 01/26/2018 03:06 AM           Functional status and cognitive function:    Ambulates without assistance.    Status: alert, cooperative, no distress, appears stated age    Diet:Cardiac    Code status and advanced care plan: Full  Point of Contact:  -Maria L Campos, [phone] ([email or web address]: Daughter)    Patient Education:  Patient was educated on the following topics prior to discharge:  -COPD    Follow-up:   Follow-up Information     Follow up With Details Comments Gregorio Mcgregor MD On 1/31/2018 at 10: [address] [facility]  [address] [place] [address]      Ruslan Etienne MD On 2/6/2018 at 10:45AM [address] [facility]  [phone]              ================================================================  Mulu Pineda MD, PGY-1  [address]

## 2018-01-30 ENCOUNTER — PATIENT OUTREACH (OUTPATIENT)
Dept: CASE MANAGEMENT | Age: 58
End: 2018-01-30

## 2018-02-04 ENCOUNTER — HOME CARE VISIT (OUTPATIENT)
Dept: SCHEDULING | Facility: HOME HEALTH | Age: 58
End: 2018-02-04

## 2018-02-05 ENCOUNTER — HOME CARE VISIT (OUTPATIENT)
Dept: HOME HEALTH SERVICES | Facility: HOME HEALTH | Age: 58
End: 2018-02-05

## 2018-02-06 ENCOUNTER — OFFICE VISIT (OUTPATIENT)
Dept: PULMONOLOGY | Age: 58
End: 2018-02-06

## 2018-02-06 VITALS
OXYGEN SATURATION: 97 % | HEART RATE: 84 BPM | TEMPERATURE: 97.5 F | BODY MASS INDEX: 44.92 KG/M2 | WEIGHT: 253.5 LBS | RESPIRATION RATE: 20 BRPM | SYSTOLIC BLOOD PRESSURE: 130 MMHG | DIASTOLIC BLOOD PRESSURE: 70 MMHG | HEIGHT: 63 IN

## 2018-02-06 DIAGNOSIS — Z99.89 OSA ON CPAP: ICD-10-CM

## 2018-02-06 DIAGNOSIS — J96.10 CHRONIC RESPIRATORY FAILURE, UNSPECIFIED WHETHER WITH HYPOXIA OR HYPERCAPNIA (HCC): ICD-10-CM

## 2018-02-06 DIAGNOSIS — G47.33 OSA ON CPAP: ICD-10-CM

## 2018-02-06 DIAGNOSIS — J44.1 COPD EXACERBATION (HCC): Primary | ICD-10-CM

## 2018-02-06 NOTE — PROGRESS NOTES
[facility]  Pulmonary, Critical Care, and Sleep Medicine      Chief complaint:  COPD exacerbation     HPI:    Nishant Braun    is 61years old and returns the office today for follow-up concerning an exacerbation of COPD requiring hospitalization. The patient relates now her cough is improved she has difficulty bringing up mucus but the mucus she brings up is clearer than the initial purulent mucus noted earlier. She also relates her shortness of breath is improving and she denies chest pain leg swelling. She uses albuterol as needed and Advair Spiriva and supplemental oxygen all the time and uses her CPAP at night      Allergies   Allergen Reactions    Ancef [Cefazolin] Hives    Contrast Agent [Iodine] Anaphylaxis, Shortness of Breath and Swelling     Needs pre-medication for IV contrast with Benadryl, Solu-Medrol    Fish Containing Products Anaphylaxis     Pt states she had a severe allergic reaction at 9 y/o.  Metformin Other (comments)     Abdominal pain, diarrhea.  Codeine Other (comments)     Altered mental status     Current Outpatient Prescriptions   Medication Sig    tiotropium bromide (SPIRIVA RESPIMAT) 1.25 mcg/actuation inhaler Take 2 Puffs by inhalation daily.  insulin glargine (LANTUS) 100 unit/mL injection 20 Units by SubCUTAneous route nightly. Indications: type 2 diabetes mellitus    ergocalciferol (ERGOCALCIFEROL) 50,000 unit capsule Take 1 Cap by mouth every seven (7) days for 30 days. Indications: Vitamin D Deficiency    neomycin-polymyxin-hc (CORTISPORIN) 3.5-10,000-10 mg-unit-mg/mL ophthalmic suspension Administer 1 Drop to both eyes four (4) times daily as needed.  lisinopril (PRINIVIL, ZESTRIL) 20 mg tablet Take 20 mg by mouth two (2) times a day.  OXYGEN-AIR DELIVERY SYSTEMS 2 L by Nasal route continuous.  fluticasone-salmeterol (ADVAIR HFA) 230-21 mcg/actuation inhaler Take 2 Puffs by inhalation two (2) times a day.     Diabetic [facility]. misc Diabetic needles 1/2 inch 31 gauge - 1 injection daily    Diabetic Supplies, [employer]ell[employer]. misc Diabetic syringes 1 mL - use one daily    Diabetic Supplies, [address] [facility] Diabetic test strips - use three times daily    Diabetic Supplies, [address] [facility] Diabetic lancets - use three times daily    insulin lispro (HUMALOG) 100 unit/mL injection Check sugars three times a day before meals, if blood sugar is greater than 250 give yourself 8 units of Humalog before eating. Indications: type 2 diabetes mellitus    aspirin delayed-release 81 mg tablet Take 81 mg by mouth daily.  famotidine (PEPCID) 20 mg tablet Take 20 mg by mouth two (2) times daily as needed.  guaiFENesin ER (MUCINEX) 600 mg ER tablet Take 600 mg by mouth two (2) times daily as needed for Congestion.  albuterol (PROVENTIL HFA, VENTOLIN HFA, PROAIR HFA) 90 mcg/actuation inhaler Take 2 Puffs by inhalation every four (4) hours as needed for Wheezing. For the next 2 days after hospital discharge, use your inhaler 4 times a day.  albuterol (PROVENTIL VENTOLIN) 2.5 mg /3 mL (0.083 %) nebulizer solution 2.5 mg by Nebulization route every four (4) hours as needed for Wheezing.  Inhalational Spacing Device (AEROCHAMBER) 1 Each by Does Not Apply route as needed for Cough or Other (COPD exacerbation).  fluticasone (FLONASE) 50 mcg/actuation nasal spray 2 Sprays by Both Nostrils route daily.  montelukast (SINGULAIR) 10 mg tablet Take 10 mg by mouth nightly.  predniSONE (DELTASONE) 20 mg tablet Take 3 tablets for 3 days, take 2 tablets for 3 days, take 1 tablet for 3 days    pantoprazole (PROTONIX) 40 mg tablet Take 40 mg by mouth daily.  ipratropium (ATROVENT) 0.02 % nebulizer solution Take  by inhalation four (4) times daily as needed. No current facility-administered medications for this visit.       Past Medical History:   Diagnosis Date    Asthma     COPD     Cystocele, midline     Diabetes mellitus ([address]     GERD (gastroesophageal reflux disease)     Hidradenitis suppurativa     Hyperlipidemia     Hypertension     SHERRIE on CPAP     CPAP    Stress incontinence      Past Surgical History:   Procedure Laterality Date    BREAST SURGERY PROCEDURE UNLISTED      Right breast benign tumor removal    HX APPENDECTOMY      HX CHOLECYSTECTOMY      HX DILATION AND CURETTAGE      Dysfunctional uterine bleeding, thought 2/2 fibroids    HX TUBAL LIGATION       Social History     Social History    Marital status: LEGALLY      Spouse name: N/A    Number of children: N/A    Years of education: N/A     Occupational History    Not on file.      Social History Main Topics    Smoking status: Former Smoker     Packs/day: 1.00     Years: 30.00     Types: Cigarettes     Start date: 5/6/1966     Quit date: 9/6/2006    Smokeless tobacco: Never Used      Comment: 1-1.5 packs per day    Alcohol use No    Drug use: No    Sexual activity: No     Other Topics Concern    Not on file     Social History Narrative     Family History   Problem Relation Age of Onset    Hypertension Mother     Stroke Mother     Breast Cancer Mother      Bilateral mastectomies    Cancer Mother      ovarian and breast    Heart Failure Mother     Heart Attack Father      2011    Heart Surgery Father      CABG    Heart Failure Father     COPD Sister      Heavy smoker    Cancer Sister      ovarian    Heart Failure Sister     Lung Disease Sister     Asthma Child     Cancer Maternal Aunt      pancreatic    Cancer Maternal Grandfather      stomach       Review of systems:  She denies fever chills poor appetite or weight loss    Physical Exam:  Visit Vitals    /70 (BP 1 Location: Left arm, BP Patient Position: Sitting)    Pulse 84    Temp 97.5 °F (36.4 °C)    Resp 20    Ht 5' 3\" (1.6 m)    Wt 115 kg (253 lb 8 oz)    SpO2 97%  Comment: O2 @ 2 liters    BMI 44.91 kg/m2       Well-developed obese  HEENT: WNL  Lymph node exam: Supraclavicular cervical lymph nodes negative  Chest: Equal symmetrical expansion no dullness no wheezes rales rubs  Heart: Regular rhythm no gallop no murmur no JVD no peripheral edema  Extremities: No cyanosis clubbing or calf tenderness  Skin: No rashes  Musculoskeletal: No acute joint inflammation or muscle wasting  Neurological: Alert and oriented    Labs:    O2 sat room air at rest 97%  Chest x-ray 1/23/18 personally reviewed with flattened diaphragms normal appearing heart size and discoid like infiltrates probably representing scars and no evidence of active cardiopulmonary process    Impression:     Exacerbation of COPD with chronic respiratory failure which appears stable posttreatment    Plan:     Continue CPAP Spiriva Respimat and Advair as needed albuterol  Use supplemental oxygen with sleep and significant activity but not with sedentary activities or short walks  Plan for pulmonary rehab on return in 3 months  Luis Valdez MD , [facility]    CC: Jos Alarcon MD     [address] [facility]  [facility], [address] [address] [address]     P: [phone]     F: [phone]

## 2018-02-06 NOTE — PATIENT INSTRUCTIONS
Continue Advair 2 inhalations twice daily and remember to wash mouth with water and spit it out after inhaling  Continue Spiriva 2 inhalations daily    Albuterol by nebulizer every 4 hours as needed or by hand-held inhaler 2 puffs every 4 hours as needed but if you require albuterol too often to control respiratory symptoms call the office for severe symptoms go to the emergency room    Use supplemental oxygen with sleep and significant activity but not with sedentary activities for short walks

## 2018-02-06 NOTE — PROGRESS NOTES
O2 Sat is checked on Room Air    O2 Sat at rest [address] is 95%  P  83  SOB scale 0/10    Ambulated on Room Air:    110 feet O2 Sat is 95%  P  85  SBO scale 2/10    220 feet O2 Sat is 94%  P  107  SOB scale 5/10  \"I feel worn out\".     330 [address] O2 Sat is 94%  P  113  SOB scale 8/10

## 2018-02-06 NOTE — MR AVS SNAPSHOT
[address] [facility], [address] [address] 
[phone] Patient: Nishant Braun MRN: [id number] Visit Information Date & Time Provider Department Dept. Phone Encounter #  
 2/6/2018 10:45 AM Nellie Mills MD [facility] [facility]  Follow-up Instructions Return in about 3 months (around 5/6/2018). Follow-up and Disposition History Upcoming Health Maintenance Date Due Hepatitis C Screening 1960 FOOT EXAM Q1 8/21/1969 MICROALBUMIN Q1 8/21/1969 EYE EXAM RETINAL OR DILATED Q1 8/21/1969 Pneumococcal 19-64 Medium Risk (1 of 1 - PPSV23) 8/21/1978 DTaP/Tdap/Td series (1 - Tdap) 8/21/1980 PAP AKA CERVICAL CYTOLOGY 8/21/1980 FOBT Q 1 YEAR AGE 50-75 8/21/2009 LIPID PANEL Q1 11/20/2013 HEMOGLOBIN A1C Q6M 7/24/2018 BREAST CANCER SCRN MAMMOGRAM 11/16/2018 Allergies as of 2/6/2018  Review Complete On: 1/23/2018 By: Khadra Sanchez RN Severity Noted Reaction Type Reactions Ancef [Cefazolin] High 09/30/2012    Hives Contrast Agent [Iodine] High 09/30/2012    Anaphylaxis, Shortness of Breath, Swelling Needs pre-medication for IV contrast with Benadryl, Solu-Medrol Fish Containing Products High 02/10/2017   Intolerance Anaphylaxis Pt states she had a severe allergic reaction at 7 y/o. Metformin  05/24/2015   Intolerance Other (comments) Abdominal pain, diarrhea. Codeine Low 09/30/2012    Other (comments) Altered mental status Current Immunizations  Never Reviewed Name Date Influenza Vaccine (Quad) PF 1/26/2018  4:19 PM  
  
 Not reviewed this visit You Were Diagnosed With   
  
 Codes Comments COPD exacerbation ([facility]    -  Primary ICD-10-CM: J44.1 ICD-9-CM: 491.21   
 SHERRIE on CPAP     ICD-10-CM: G47.33, Z99.89 ICD-9-CM: 327.23, V46.8 Chronic respiratory failure, unspecified whether with hypoxia or hypercapnia ([facility]     ICD-10-CM: J96.10 ICD-9-CM: 518.83 Vitals BP Pulse Temp Resp Height(growth percentile) Weight(growth percentile) 130/70 (BP 1 Location: Left arm, BP Patient Position: Sitting) 84 97.5 °F (36.4 °C) 20 5' 3\" (1.6 m) 253 lb 8 oz (115 kg) SpO2 BMI OB Status Smoking Status 97% 44.91 kg/m2 Menopause Former Smoker Vitals History BMI and BSA Data Body Mass Index Body Surface Area 44.91 kg/m 2 2.26 m 2 Preferred Pharmacy Pharmacy Name Phone [address]. [phone] Your Updated Medication List  
  
   
This list is accurate as of: 2/6/18 11:29 AM.  Always use your most recent med list.  
  
  
  
  
 Royer Dye 213-65 mcg/actuation inhaler Generic drug:  fluticasone-salmeterol Take 2 Puffs by inhalation two (2) times a day. * albuterol 2.5 mg /3 mL (0.083 %) nebulizer solution Commonly known as:  PROVENTIL VENTOLIN  
2.5 mg by Nebulization route every four (4) hours as needed for Wheezing. * albuterol 90 mcg/actuation inhaler Commonly known as:  PROVENTIL HFA, VENTOLIN HFA, PROAIR HFA Take 2 Puffs by inhalation every four (4) hours as needed for Wheezing. For the next 2 days after hospital discharge, use your inhaler 4 times a day. aspirin delayed-release 81 mg tablet Take 81 mg by mouth daily. cpap machine kit  
by Does Not Apply route nightly. * Diabetic Supplies, [address] [facility] Diabetic needles 1/2 inch 31 gauge - 1 injection daily * Diabetic Supplies, [address] [facility] Diabetic syringes 1 mL - use one daily * Diabetic Supplies, [address] [facility] Diabetic test strips - use three times daily * Diabetic Supplies, [address] [facility] Diabetic lancets - use three times daily  
  
 ergocalciferol 50,000 unit capsule Commonly known as:  ERGOCALCIFEROL Take 1 Cap by mouth every seven (7) days for 30 days. Indications: Vitamin D Deficiency  
  
 famotidine 20 mg tablet Commonly known as:  PEPCID Take 20 mg by mouth two (2) times daily as needed. FLONASE 50 mcg/actuation nasal spray Generic drug:  fluticasone 2 Sprays by Both Nostrils route daily. inhalational spacing device Commonly known as:  AEROCHAMBER  
1 Each by Does Not Apply route as needed for Cough or Other (COPD exacerbation). insulin glargine 100 unit/mL injection Commonly known as:  LANTUS  
20 Units by SubCUTAneous route nightly. Indications: type 2 diabetes mellitus  
  
 insulin lispro 100 unit/mL injection Commonly known as:  HumaLOG Check sugars three times a day before meals, if blood sugar is greater than 250 give yourself 8 units of Humalog before eating. Indications: type 2 diabetes mellitus  
  
 ipratropium 0.02 % Soln Commonly known as:  ATROVENT Take  by inhalation four (4) times daily as needed. lisinopril 20 mg tablet Commonly known as:  Afia James Take 20 mg by mouth two (2) times a day. MUCINEX 600 mg ER tablet Generic drug:  guaiFENesin ER Take 600 mg by mouth two (2) times daily as needed for Congestion. neomycin-polymyxin-hc 3.5-10,000-10 mg-unit-mg/mL ophthalmic suspension Commonly known as:  CORTISPORIN Administer 1 Drop to both eyes four (4) times daily as needed. OXYGEN-AIR DELIVERY SYSTEMS  
2 L by Nasal route continuous. pantoprazole 40 mg tablet Commonly known as:  PROTONIX Take 40 mg by mouth daily. predniSONE 20 mg tablet Commonly known as:  Vicie Curio Take 3 tablets for 3 days, take 2 tablets for 3 days, take 1 tablet for 3 days SINGULAIR 10 mg tablet Generic drug:  montelukast  
Take 10 mg by mouth nightly. SPIRIVA RESPIMAT 1.25 mcg/actuation inhaler Generic drug:  tiotropium bromide Take 2 Puffs by inhalation daily. * Notice: This list has 6 medication(s) that are the same as other medications prescribed for you. Read the directions carefully, and ask your doctor or other care provider to review them with you. Follow-up Instructions Return in about 3 months (around 5/6/2018). Patient Instructions Continue Advair 2 inhalations twice daily and remember to wash mouth with water and spit it out after inhaling Continue Spiriva 2 inhalations daily Albuterol by nebulizer every 4 hours as needed or by hand-held inhaler 2 puffs every 4 hours as needed but if you require albuterol too often to control respiratory symptoms call the office for severe symptoms go to the emergency room Use supplemental oxygen with sleep and significant activity but not with sedentary activities for short walks Patient Instructions History Introducing [facility]! Antonella Galdamez introduces Next[employer] patient portal. Now you can access parts of your medical record, email your doctor's office, and request medication refills online. 1. In your internet browser, go to [email or web address] 2. Click on the First Time User? Click Here link in the Sign In box. You will see the New Member Sign Up page. 3. Enter your NextUser Access Code exactly as it appears below. You will not need to use this code after youve completed the sign-up process. If you do not sign up before the expiration date, you must request a new code. · Next[employer] Access Code: [email or web address] Expires: 4/12/2018  9:27 PM 
 
4. Enter the last four digits of your Social Security Number (xxxx) and Date of Birth (mm/dd/yyyy) as indicated and click Submit. You will be taken to the next sign-up page. 5. Create a NextUser ID. This will be your NextUser login ID and cannot be changed, so think of one that is secure and easy to remember. 6. Create a NextUser password. You can change your password at any time. 7. Enter your Password Reset Question and Answer. This can be used at a later time if you forget your password. 8. Enter your e-mail address. You will receive e-mail notification when new information is available in [address] 9. Click Sign Up. You can now view and download portions of your medical record. 10. Click the Download Summary menu link to download a portable copy of your medical information. If you have questions, please visit the Frequently Asked Questions section of the [employer] website. Remember, [employer] is NOT to be used for urgent needs. For medical emergencies, dial 911. Now available from your iPhone and Android! Please provide this summary of care documentation to your next provider. Your primary care clinician is listed as HAYDEN Moore. If you have any questions after today's visit, please call [phone].

## 2018-02-06 NOTE — PROGRESS NOTES
Pt. Susana Ramos. from [facility] 1/26. She has some discomfort in the lungs with deep inspiration. Non prod. Cough. Pt. Feels improved. The pt. States she is due to go to [facility] tomorrow for a \"scan of my lungs\". She can't recall the specifics.

## 2018-02-09 ENCOUNTER — PATIENT OUTREACH (OUTPATIENT)
Dept: CASE MANAGEMENT | Age: 58
End: 2018-02-09

## 2018-02-12 NOTE — PROGRESS NOTES
NN attempted to contact patient 1/30/18, 2/9/18 and 2/12/18. VM left identifying self with direct contact information and request for return call on patient's/daughter's phone number. Recording states that phone is broken \"until Friday\" but that she \"checks phone every 30 minutes\". NN will close this episode as no return call from patient.

## 2018-02-21 ENCOUNTER — HOSPITAL ENCOUNTER (OUTPATIENT)
Dept: CT IMAGING | Age: 58
Discharge: HOME | End: 2018-02-21
Attending: FAMILY MEDICINE
Payer: MEDICARE

## 2018-02-21 DIAGNOSIS — R06.02 SOB (SHORTNESS OF BREATH) ON EXERTION: ICD-10-CM

## 2018-02-21 DIAGNOSIS — J44.1 COPD EXACERBATION (HCC): ICD-10-CM

## 2018-02-21 DIAGNOSIS — R91.1 LUNG NODULE: ICD-10-CM

## 2018-02-21 PROCEDURE — 71250 CT THORAX DX C-: CPT

## 2018-03-13 ENCOUNTER — APPOINTMENT (OUTPATIENT)
Dept: GENERAL RADIOLOGY | Age: 58
DRG: 191 | End: 2018-03-13
Attending: EMERGENCY MEDICINE
Payer: MEDICARE

## 2018-03-13 ENCOUNTER — HOSPITAL ENCOUNTER (INPATIENT)
Age: 58
LOS: 2 days | Discharge: HOME | DRG: 191 | End: 2018-03-15
Attending: EMERGENCY MEDICINE | Admitting: FAMILY MEDICINE
Payer: MEDICARE

## 2018-03-13 DIAGNOSIS — J44.1 COPD EXACERBATION (HCC): Primary | ICD-10-CM

## 2018-03-13 LAB
ALBUMIN SERPL-MCNC: 3.6 G/DL (ref 3.4–5)
ALBUMIN/GLOB SERPL: 1 {RATIO} (ref 0.8–1.7)
ALP SERPL-CCNC: 81 U/L (ref 45–117)
ALT SERPL-CCNC: 34 U/L (ref 13–56)
ANION GAP SERPL CALC-SCNC: 8 MMOL/L (ref 3–18)
AST SERPL-CCNC: 17 U/L (ref 15–37)
BASOPHILS # BLD: 0 K/UL (ref 0–0.06)
BASOPHILS NFR BLD: 0 % (ref 0–2)
BILIRUB SERPL-MCNC: 0.7 MG/DL (ref 0.2–1)
BNP SERPL-MCNC: 42 PG/ML (ref 0–900)
BUN SERPL-MCNC: 12 MG/DL (ref 7–18)
BUN/CREAT SERPL: 15 (ref 12–20)
CALCIUM SERPL-MCNC: 9.6 MG/DL (ref 8.5–10.1)
CHLORIDE SERPL-SCNC: 101 MMOL/L (ref 100–108)
CK MB CFR SERPL CALC: 1.3 % (ref 0–4)
CK MB SERPL-MCNC: 1.3 NG/ML (ref 5–25)
CK SERPL-CCNC: 99 U/L (ref 26–192)
CO2 SERPL-SCNC: 28 MMOL/L (ref 21–32)
CREAT SERPL-MCNC: 0.8 MG/DL (ref 0.6–1.3)
DIFFERENTIAL METHOD BLD: ABNORMAL
EOSINOPHIL # BLD: 0.2 K/UL (ref 0–0.4)
EOSINOPHIL NFR BLD: 2 % (ref 0–5)
ERYTHROCYTE [DISTWIDTH] IN BLOOD BY AUTOMATED COUNT: 14 % (ref 11.6–14.5)
FLUAV AG NPH QL IA: NEGATIVE
FLUBV AG NOSE QL IA: NEGATIVE
GLOBULIN SER CALC-MCNC: 3.5 G/DL (ref 2–4)
GLUCOSE BLD STRIP.AUTO-MCNC: 290 MG/DL (ref 70–110)
GLUCOSE BLD STRIP.AUTO-MCNC: 311 MG/DL (ref 70–110)
GLUCOSE BLD STRIP.AUTO-MCNC: 390 MG/DL (ref 70–110)
GLUCOSE BLD STRIP.AUTO-MCNC: 418 MG/DL (ref 70–110)
GLUCOSE SERPL-MCNC: 213 MG/DL (ref 74–99)
HCT VFR BLD AUTO: 37.3 % (ref 35–45)
HGB BLD-MCNC: 13.3 G/DL (ref 12–16)
LYMPHOCYTES # BLD: 2.8 K/UL (ref 0.9–3.6)
LYMPHOCYTES NFR BLD: 43 % (ref 21–52)
MCH RBC QN AUTO: 30.7 PG (ref 24–34)
MCHC RBC AUTO-ENTMCNC: 35.7 G/DL (ref 31–37)
MCV RBC AUTO: 86.1 FL (ref 74–97)
MONOCYTES # BLD: 0.5 K/UL (ref 0.05–1.2)
MONOCYTES NFR BLD: 7 % (ref 3–10)
NEUTS SEG # BLD: 3.1 K/UL (ref 1.8–8)
NEUTS SEG NFR BLD: 48 % (ref 40–73)
PLATELET # BLD AUTO: 245 K/UL (ref 135–420)
PMV BLD AUTO: 8.6 FL (ref 9.2–11.8)
POTASSIUM SERPL-SCNC: 3.9 MMOL/L (ref 3.5–5.5)
PROT SERPL-MCNC: 7.1 G/DL (ref 6.4–8.2)
RBC # BLD AUTO: 4.33 M/UL (ref 4.2–5.3)
SODIUM SERPL-SCNC: 137 MMOL/L (ref 136–145)
TROPONIN I SERPL-MCNC: <0.02 NG/ML (ref 0–0.04)
WBC # BLD AUTO: 6.5 K/UL (ref 4.6–13.2)

## 2018-03-13 PROCEDURE — 74011636637 HC RX REV CODE- 636/637: Performed by: FAMILY MEDICINE

## 2018-03-13 PROCEDURE — 80053 COMPREHEN METABOLIC PANEL: CPT | Performed by: EMERGENCY MEDICINE

## 2018-03-13 PROCEDURE — 94761 N-INVAS EAR/PLS OXIMETRY MLT: CPT

## 2018-03-13 PROCEDURE — 77030029684 HC NEB SM VOL KT MONA -A

## 2018-03-13 PROCEDURE — 87804 INFLUENZA ASSAY W/OPTIC: CPT | Performed by: STUDENT IN AN ORGANIZED HEALTH CARE EDUCATION/TRAINING PROGRAM

## 2018-03-13 PROCEDURE — 83880 ASSAY OF NATRIURETIC PEPTIDE: CPT | Performed by: EMERGENCY MEDICINE

## 2018-03-13 PROCEDURE — 74011636637 HC RX REV CODE- 636/637: Performed by: STUDENT IN AN ORGANIZED HEALTH CARE EDUCATION/TRAINING PROGRAM

## 2018-03-13 PROCEDURE — 82550 ASSAY OF CK (CPK): CPT | Performed by: EMERGENCY MEDICINE

## 2018-03-13 PROCEDURE — 74011250636 HC RX REV CODE- 250/636: Performed by: FAMILY MEDICINE

## 2018-03-13 PROCEDURE — 94640 AIRWAY INHALATION TREATMENT: CPT

## 2018-03-13 PROCEDURE — 74011000250 HC RX REV CODE- 250: Performed by: EMERGENCY MEDICINE

## 2018-03-13 PROCEDURE — 71045 X-RAY EXAM CHEST 1 VIEW: CPT

## 2018-03-13 PROCEDURE — 93005 ELECTROCARDIOGRAM TRACING: CPT

## 2018-03-13 PROCEDURE — 74011250636 HC RX REV CODE- 250/636

## 2018-03-13 PROCEDURE — 82962 GLUCOSE BLOOD TEST: CPT

## 2018-03-13 PROCEDURE — 74011250637 HC RX REV CODE- 250/637: Performed by: STUDENT IN AN ORGANIZED HEALTH CARE EDUCATION/TRAINING PROGRAM

## 2018-03-13 PROCEDURE — 65270000029 HC RM PRIVATE

## 2018-03-13 PROCEDURE — 74011000250 HC RX REV CODE- 250: Performed by: STUDENT IN AN ORGANIZED HEALTH CARE EDUCATION/TRAINING PROGRAM

## 2018-03-13 PROCEDURE — 74011250636 HC RX REV CODE- 250/636: Performed by: STUDENT IN AN ORGANIZED HEALTH CARE EDUCATION/TRAINING PROGRAM

## 2018-03-13 PROCEDURE — 99284 EMERGENCY DEPT VISIT MOD MDM: CPT

## 2018-03-13 PROCEDURE — 85025 COMPLETE CBC W/AUTO DIFF WBC: CPT | Performed by: EMERGENCY MEDICINE

## 2018-03-13 RX ORDER — INSULIN LISPRO 100 [IU]/ML
INJECTION, SOLUTION INTRAVENOUS; SUBCUTANEOUS
Status: DISCONTINUED | OUTPATIENT
Start: 2018-03-13 | End: 2018-03-13

## 2018-03-13 RX ORDER — ENOXAPARIN SODIUM 100 MG/ML
40 INJECTION SUBCUTANEOUS EVERY 24 HOURS
Status: DISCONTINUED | OUTPATIENT
Start: 2018-03-13 | End: 2018-03-15 | Stop reason: HOSPADM

## 2018-03-13 RX ORDER — AZITHROMYCIN 250 MG/1
250 TABLET, FILM COATED ORAL DAILY
Status: DISCONTINUED | OUTPATIENT
Start: 2018-03-14 | End: 2018-03-13

## 2018-03-13 RX ORDER — INSULIN LISPRO 100 [IU]/ML
INJECTION, SOLUTION INTRAVENOUS; SUBCUTANEOUS
Status: DISCONTINUED | OUTPATIENT
Start: 2018-03-13 | End: 2018-03-15 | Stop reason: HOSPADM

## 2018-03-13 RX ORDER — ASPIRIN 81 MG/1
81 TABLET ORAL DAILY
Status: DISCONTINUED | OUTPATIENT
Start: 2018-03-14 | End: 2018-03-15 | Stop reason: HOSPADM

## 2018-03-13 RX ORDER — PREDNISONE 20 MG/1
60 TABLET ORAL
Status: COMPLETED | OUTPATIENT
Start: 2018-03-14 | End: 2018-03-15

## 2018-03-13 RX ORDER — PANTOPRAZOLE SODIUM 40 MG/1
40 TABLET, DELAYED RELEASE ORAL
Status: DISCONTINUED | OUTPATIENT
Start: 2018-03-14 | End: 2018-03-15 | Stop reason: HOSPADM

## 2018-03-13 RX ORDER — IPRATROPIUM BROMIDE AND ALBUTEROL SULFATE 2.5; .5 MG/3ML; MG/3ML
3 SOLUTION RESPIRATORY (INHALATION)
Status: COMPLETED | OUTPATIENT
Start: 2018-03-13 | End: 2018-03-13

## 2018-03-13 RX ORDER — IPRATROPIUM BROMIDE 0.5 MG/2.5ML
0.5 SOLUTION RESPIRATORY (INHALATION) 4 TIMES DAILY
Status: DISCONTINUED | OUTPATIENT
Start: 2018-03-13 | End: 2018-03-13 | Stop reason: SDUPTHER

## 2018-03-13 RX ORDER — DEXTROSE 50 % IN WATER (D50W) INTRAVENOUS SYRINGE
25-50 AS NEEDED
Status: DISCONTINUED | OUTPATIENT
Start: 2018-03-13 | End: 2018-03-15 | Stop reason: HOSPADM

## 2018-03-13 RX ORDER — SIMETHICONE 80 MG
80 TABLET,CHEWABLE ORAL
COMMUNITY
End: 2019-08-31

## 2018-03-13 RX ORDER — IPRATROPIUM BROMIDE AND ALBUTEROL SULFATE 2.5; .5 MG/3ML; MG/3ML
3 SOLUTION RESPIRATORY (INHALATION)
Status: DISCONTINUED | OUTPATIENT
Start: 2018-03-13 | End: 2018-03-13

## 2018-03-13 RX ORDER — BUDESONIDE AND FORMOTEROL FUMARATE DIHYDRATE 160; 4.5 UG/1; UG/1
2 AEROSOL RESPIRATORY (INHALATION)
Status: DISCONTINUED | OUTPATIENT
Start: 2018-03-14 | End: 2018-03-15

## 2018-03-13 RX ORDER — AZITHROMYCIN 250 MG/1
250 TABLET, FILM COATED ORAL DAILY
Status: DISCONTINUED | OUTPATIENT
Start: 2018-03-14 | End: 2018-03-15 | Stop reason: HOSPADM

## 2018-03-13 RX ORDER — INSULIN GLARGINE 100 [IU]/ML
25 INJECTION, SOLUTION SUBCUTANEOUS
Status: ON HOLD | COMMUNITY
End: 2018-03-15

## 2018-03-13 RX ORDER — LISINOPRIL 40 MG/1
20 TABLET ORAL 2 TIMES DAILY
COMMUNITY
End: 2019-08-31

## 2018-03-13 RX ORDER — LISINOPRIL 20 MG/1
40 TABLET ORAL DAILY
Status: DISCONTINUED | OUTPATIENT
Start: 2018-03-14 | End: 2018-03-15 | Stop reason: HOSPADM

## 2018-03-13 RX ORDER — IPRATROPIUM BROMIDE AND ALBUTEROL SULFATE 2.5; .5 MG/3ML; MG/3ML
3 SOLUTION RESPIRATORY (INHALATION)
Status: DISCONTINUED | OUTPATIENT
Start: 2018-03-14 | End: 2018-03-15 | Stop reason: HOSPADM

## 2018-03-13 RX ORDER — ACETAMINOPHEN 325 MG/1
650 TABLET ORAL
Status: DISCONTINUED | OUTPATIENT
Start: 2018-03-13 | End: 2018-03-15 | Stop reason: HOSPADM

## 2018-03-13 RX ORDER — MONTELUKAST SODIUM 10 MG/1
10 TABLET ORAL
Status: DISCONTINUED | OUTPATIENT
Start: 2018-03-13 | End: 2018-03-15 | Stop reason: HOSPADM

## 2018-03-13 RX ORDER — OMEPRAZOLE 40 MG/1
40 CAPSULE, DELAYED RELEASE ORAL DAILY
COMMUNITY
End: 2020-04-08 | Stop reason: ALTCHOICE

## 2018-03-13 RX ORDER — FAMOTIDINE 20 MG/1
20 TABLET, FILM COATED ORAL
Status: DISCONTINUED | OUTPATIENT
Start: 2018-03-13 | End: 2018-03-15 | Stop reason: HOSPADM

## 2018-03-13 RX ORDER — SIMETHICONE 80 MG
80 TABLET,CHEWABLE ORAL
Status: DISCONTINUED | OUTPATIENT
Start: 2018-03-13 | End: 2018-03-15 | Stop reason: HOSPADM

## 2018-03-13 RX ORDER — MAGNESIUM SULFATE 100 %
16 CRYSTALS MISCELLANEOUS AS NEEDED
Status: DISCONTINUED | OUTPATIENT
Start: 2018-03-13 | End: 2018-03-15 | Stop reason: HOSPADM

## 2018-03-13 RX ORDER — MAGNESIUM SULFATE 100 %
4 CRYSTALS MISCELLANEOUS AS NEEDED
Status: DISCONTINUED | OUTPATIENT
Start: 2018-03-13 | End: 2018-03-15 | Stop reason: HOSPADM

## 2018-03-13 RX ORDER — INSULIN GLARGINE 100 [IU]/ML
25 INJECTION, SOLUTION SUBCUTANEOUS
Status: DISCONTINUED | OUTPATIENT
Start: 2018-03-13 | End: 2018-03-14

## 2018-03-13 RX ORDER — FLUTICASONE PROPIONATE 50 MCG
2 SPRAY, SUSPENSION (ML) NASAL DAILY
Status: DISCONTINUED | OUTPATIENT
Start: 2018-03-14 | End: 2018-03-15 | Stop reason: HOSPADM

## 2018-03-13 RX ADMIN — MONTELUKAST SODIUM 10 MG: 10 TABLET, FILM COATED ORAL at 22:40

## 2018-03-13 RX ADMIN — IPRATROPIUM BROMIDE AND ALBUTEROL SULFATE 3 ML: .5; 3 SOLUTION RESPIRATORY (INHALATION) at 19:32

## 2018-03-13 RX ADMIN — ENOXAPARIN SODIUM 40 MG: 100 INJECTION SUBCUTANEOUS at 14:08

## 2018-03-13 RX ADMIN — IPRATROPIUM BROMIDE AND ALBUTEROL SULFATE 3 ML: 2.5; .5 SOLUTION RESPIRATORY (INHALATION) at 10:00

## 2018-03-13 RX ADMIN — INSULIN LISPRO 15 UNITS: 100 INJECTION, SOLUTION INTRAVENOUS; SUBCUTANEOUS at 18:14

## 2018-03-13 RX ADMIN — AZITHROMYCIN MONOHYDRATE 500 MG: 500 INJECTION, POWDER, LYOPHILIZED, FOR SOLUTION INTRAVENOUS at 22:58

## 2018-03-13 RX ADMIN — METHYLPREDNISOLONE SODIUM SUCCINATE 125 MG: 125 INJECTION, POWDER, FOR SOLUTION INTRAMUSCULAR; INTRAVENOUS at 19:53

## 2018-03-13 RX ADMIN — IPRATROPIUM BROMIDE AND ALBUTEROL SULFATE 3 ML: 2.5; .5 SOLUTION RESPIRATORY (INHALATION) at 16:12

## 2018-03-13 RX ADMIN — INSULIN GLARGINE 25 UNITS: 100 INJECTION, SOLUTION SUBCUTANEOUS at 22:39

## 2018-03-13 RX ADMIN — INSULIN LISPRO 9 UNITS: 100 INJECTION, SOLUTION INTRAVENOUS; SUBCUTANEOUS at 22:40

## 2018-03-13 RX ADMIN — INSULIN LISPRO 8 UNITS: 100 INJECTION, SOLUTION INTRAVENOUS; SUBCUTANEOUS at 16:05

## 2018-03-13 NOTE — IP AVS SNAPSHOT
[address] [id number] [facility] [address] [place] [address] Patient: Sky Disla MRN: [id number] VMP:9/23/9830 About your hospitalization You were admitted on:  March 13, 2018 You last received care in the:  [facility] [address] [facility] You were discharged on:  March 15, 2018 Why you were hospitalized Your primary diagnosis was:  Copd With Acute Exacerbation (Hcc) Your diagnoses also included:  Copd (Chronic Obstructive Pulmonary Disease) (Hcc), Essential Hypertension, Benign, Esophageal Reflux, Byron On Cpap, Hyperlipidemia, Diabetes Mellitus, Type 2 (Hcc), Obesity, Class Iii, Bmi 40-49.9 (Morbid Obesity) (Hcc) Follow-up Information Follow up With Details Comments Contact Info Lico Toney MD On 3/23/2018 @  9:40; New Location at [facility] [address] [address] 
[phone] Sofia Osborn MD On 4/9/2018 @ 12:00 235 [facility] [place] [address] [address] 
[phone] Your Scheduled Appointments Friday March 16, 2018 To Be Determined [address] with Mirtha Rodrigues LPN  
 [facility] CARE SCHEDULING/INTAKE ([address] [place] [address]) 325 [facility] CARE SCHEDULING/INTAKE ( HOME HEALTH/ HOSPICE) Monday April 09, 2018 12:00 PM EDT Follow Up with Sal Richardson MD  
[address] ([address]) 235 [facility], [address] [address]  
[phone] Discharge Orders None A check cole indicates which time of day the medication should be taken. My Medications START taking these medications Instructions Each Dose to Equal  
 Morning Noon Evening Bedtime  
 azithromycin 250 mg tablet Commonly known as:  Gabriela Allen Start taking on:  3/16/2018 Your last dose was: Your next dose is: Take 1 tablet daily for COPD exacerbation  
     
   
   
   
  
 predniSONE 10 mg tablet Commonly known as:  Carmenza Jean Your last dose was: Your next dose is:    
   
   
 Start day after discharge: Day 1: Take 6 pills PO Day 2-3:Take 4 pills PO Day 4-5:Take 3 pills PO Days 6-7:Take 2 pills PO  
     
   
   
   
  
 umeclidinium 62.5 mcg/actuation inhaler Commonly known as:  INCRUSE ELLIPTA Your last dose was: Your next dose is: Take 1 Puff by inhalation daily. Indications: BRONCHOSPASM PREVENTION WITH COPD  
 1 Puff CHANGE how you take these medications Instructions Each Dose to Equal  
 Morning Noon Evening Bedtime * Diabetic Supplies, [address] Misc What changed:  Another medication with the same name was changed. Make sure you understand how and when to take each. Your last dose was: Your next dose is:    
   
   
 Diabetic syringes 1 mL - use one daily * Diabetic Supplies, [address] Misc What changed:  Another medication with the same name was changed. Make sure you understand how and when to take each. Your last dose was: Your next dose is:    
   
   
 Diabetic needles 1/2 inch 31 gauge - 1 injection daily * Diabetic Supplies, [address] Misc What changed:  additional instructions Your last dose was: Your next dose is:    
   
   
 Diabetic test strips - use four times daily * Diabetic Supplies, [address] Misc What changed:  Another medication with the same name was changed. Make sure you understand how and when to take each. Your last dose was: Your next dose is:    
   
   
 Diabetic lancets - use three times daily  
     
   
   
   
  
 insulin glargine 100 unit/mL (3 mL) Inpn Commonly known as:  Abdullahi Bennett What changed:  how much to take Your last dose was: Your next dose is:    
   
   
 28 Units by SubCUTAneous route nightly. Indications: type 2 diabetes mellitus 28 Units * insulin lispro 100 unit/mL injection Commonly known as:  HumaLOG U-100 Insulin What changed:  additional instructions Your last dose was: Your next dose is:    
   
   
 Check sugars three times a day before meals, if blood sugar is greater than 250 give yourself 8 units of Humalog before eating. Indications: type 2 diabetes mellitus * insulin lispro 100 unit/mL Inph What changed: You were already taking a medication with the same name, and this prescription was added. Make sure you understand how and when to take each. Your last dose was: Your next dose is:    
   
   
 4 Units by SubCUTAneous route Before breakfast, lunch, and dinner. Indications: type 2 diabetes mellitus, while taking steroids 4 Units  
    
   
   
   
  
 ipratropium 0.02 % Soln Commonly known as:  ATROVENT What changed:   
- how much to take 
- how to take this - Another medication with the same name was removed. Continue taking this medication, and follow the directions you see here. Your last dose was: Your next dose is:    
   
   
 2.5 mL by Nebulization route four (4) times daily as needed. Indications: BRONCHOSPASM PREVENTION WITH COPD  
 0.5 mg  
    
   
   
   
  
 * Notice: This list has 6 medication(s) that are the same as other medications prescribed for you. Read the directions carefully, and ask your doctor or other care provider to review them with you. CONTINUE taking these medications Instructions Each Dose to Equal  
 Morning Noon Evening Bedtime Sean Avila 669-06 mcg/actuation inhaler Generic drug:  fluticasone-salmeterol Your last dose was: Your next dose is: Take 2 Puffs by inhalation two (2) times a day. 2 Puff * albuterol 90 mcg/actuation inhaler Commonly known as:  PROVENTIL HFA, VENTOLIN HFA, PROAIR HFA Your last dose was: Your next dose is: Take 2 Puffs by inhalation every four (4) hours as needed for Wheezing. For the next 2 days after hospital discharge, use your inhaler 4 times a day. 2 Puff * albuterol 2.5 mg /3 mL (0.083 %) nebulizer solution Commonly known as:  PROVENTIL VENTOLIN Your last dose was: Your next dose is:    
   
   
 3 mL by Nebulization route every four (4) hours as needed for Wheezing. Indications: BRONCHOSPASM PREVENTION, Chronic Obstructive Pulmonary Disease 2.5 mg  
    
   
   
   
  
 aspirin delayed-release 81 mg tablet Your last dose was: Your next dose is: Take 81 mg by mouth daily. 81 mg  
    
   
   
   
  
 cpap machine kit Your last dose was: Your next dose is:    
   
   
 by Does Not Apply route nightly. famotidine 20 mg tablet Commonly known as:  PEPCID Your last dose was: Your next dose is: Take 20 mg by mouth two (2) times daily as needed. 20 mg  
    
   
   
   
  
 FLONASE 50 mcg/actuation nasal spray Generic drug:  fluticasone Your last dose was: Your next dose is: 2 Sprays by Both Nostrils route daily. 2 Spray  
    
   
   
   
  
 inhalational spacing device Commonly known as:  AEROCHAMBER Your last dose was: Your next dose is:    
   
   
 1 Each by Does Not Apply route as needed for Cough or Other (COPD exacerbation). 1 Each  
    
   
   
   
  
 lisinopril 40 mg tablet Commonly known as:  Lizz Aleman Your last dose was: Your next dose is: Take 40 mg by mouth daily. Indications: hypertension 40 mg  
    
   
   
   
  
 omeprazole 40 mg capsule Commonly known as:  PRILOSEC Your last dose was: Your next dose is: Take 40 mg by mouth daily. Indications: gastroesophageal reflux disease 40 mg OXYGEN-AIR DELIVERY SYSTEMS Your last dose was: Your next dose is:    
   
   
 2 L by Nasal route continuous. 2 L  
    
   
   
   
  
 simethicone 80 mg chewable tablet Commonly known as:  Sylvia Bianchi Your last dose was: Your next dose is: Take 80 mg by mouth every six (6) hours as needed for Flatulence. 80 mg  
    
   
   
   
  
 SINGULAIR 10 mg tablet Generic drug:  montelukast  
   
Your last dose was: Your next dose is: Take 10 mg by mouth nightly. 10 mg  
    
   
   
   
  
 * Notice: This list has 2 medication(s) that are the same as other medications prescribed for you. Read the directions carefully, and ask your doctor or other care provider to review them with you. Where to Get Your Medications These medications were sent to [address] [place], [place] [id number] Phone:  [phone]  
  azithromycin 250 mg tablet Diabetic Supplies, [address] [facility] Diabetic Supplies, [address]. Misc  
 insulin glargine 100 unit/mL (3 mL) Inpn  
 insulin lispro 100 unit/mL Inph  
 umeclidinium 62.5 mcg/actuation inhaler Information on where to get these meds will be given to you by the nurse or doctor. ! Ask your nurse or doctor about these medications  
  albuterol 2.5 mg /3 mL (0.083 %) nebulizer solution Diabetic Supplies, [address] Misc  
 ipratropium 0.02 % Soln  
 predniSONE 10 mg tablet Discharge Instructions Use your breathing exercises and slow deep breaths with your spacer. Continue the 4 units of insulin before meals while you are on the steroids unless your sugars are less than 300.  
Call [employer] at [phone] if you have difficulty getting your medications or your breathing is worse. If you have a temperature greater than 100.4*F, Call your doctor. If you have Chest pain  lasting more than 15 minutes, seek care at the nearest Emergency Department. If you cannot tolerate drinking fluids, seek care at the nearest Emergency Department. DISCHARGE SUMMARY from Nurse PATIENT INSTRUCTIONS: 
 
 
F-face looks uneven A-arms unable to move or move unevenly S-speech slurred or non-existent T-time-call 911 as soon as signs and symptoms begin-DO NOT go Back to bed or wait to see if you get better-TIME IS BRAIN. Warning Signs of HEART ATTACK Call 911 if you have these symptoms: 
? Chest discomfort. Most heart attacks involve discomfort in the center of the chest that lasts more than a few minutes, or that goes away and comes back. It can feel like uncomfortable pressure, squeezing, fullness, or pain. ? Discomfort in other areas of the upper body. Symptoms can include pain or discomfort in one or both arms, the back, neck, jaw, or stomach. ? Shortness of breath with or without chest discomfort. ? Other signs may include breaking out in a cold sweat, nausea, or lightheadedness. Don't wait more than five minutes to call [address]! Fast action can save your life. Calling 911 is almost always the fastest way to get lifesaving treatment. Emergency Medical Services staff can begin treatment when they arrive  up to an hour sooner than if someone gets to the hospital by car. The discharge information has been reviewed with the {PATIENT PARENT GUARDIAN:98881}. The {PATIENT PARENT GUARDIAN:43352} verbalized understanding.  
Discharge medications reviewed with the {Dishcarge meds reviewed [facility]:68[address]} and appropriate educational materials and side effects teaching were provided. ___________________________________________________________________________________________________________________________________ MyChart Announcement We are excited to announce that we are making your provider's discharge notes available to you in [employer]. You will see these notes when they are completed and signed by the physician that discharged you from your recent hospital stay. If you have any questions or concerns about any information you see in [employer], please call the Health Information Department where you were seen or reach out to your Primary Care Provider for more information about your plan of care. Introducing [facility]! Yvonne Queen introduces [employer] patient portal. Now you can access parts of your medical record, email your doctor's office, and request medication refills online. 1. In your internet browser, go to [email or web address] 2. Click on the First Time User? Click Here link in the Sign In box. You will see the New Member Sign Up page. 3. Enter your [employer] Access Code exactly as it appears below. You will not need to use this code after youve completed the sign-up process. If you do not sign up before the expiration date, you must request a new code. · [employer] Access Code: [email or web address] Expires: 4/12/2018 10:27 PM 
 
4. Enter the last four digits of your Social Security Number (xxxx) and Date of Birth (mm/dd/yyyy) as indicated and click Submit. You will be taken to the next sign-up page. 5. Create a [employer] ID. This will be your [employer] login ID and cannot be changed, so think of one that is secure and easy to remember. 6. Create a [employer] password. You can change your password at any time. 7. Enter your Password Reset Question and Answer. This can be used at a later time if you forget your password. 8. Enter your e-mail address. You will receive e-mail notification when new information is available in [address] 9. Click Sign Up. You can now view and download portions of your medical record. 10. Click the Download Summary menu link to download a portable copy of your medical information. If you have questions, please visit the Frequently Asked Questions section of the MyChart website. Remember, [employer] is NOT to be used for urgent needs. For medical emergencies, dial 911. Now available from your iPhone and Android! Providers Seen During Your Hospitalization Provider Specialty Primary office phone Azra Gallego DO Emergency Medicine [phone] Renee Cavazos MD Family Practice [phone] Your Primary Care Physician (PCP) Primary Care Physician Office Phone Office Fax Alfred Enrique [phone] You are allergic to the following Allergen Reactions Ancef (Cefazolin) Hives Contrast Agent (Iodine) Anaphylaxis Shortness of Breath Swelling Needs pre-medication for IV contrast with Benadryl, Solu-Medrol Fish Containing Products Anaphylaxis Pt states she had a severe allergic reaction at 9 y/o. Metformin Other (comments) Abdominal pain, diarrhea. Codeine Other (comments) Altered mental status Recent Documentation Height Weight Breastfeeding? BMI OB Status Smoking Status 1.6 m 114.7 kg No 44.78 kg/m2 Menopause Former Smoker Emergency Contacts Name Discharge Info Relation Home Work Mobile Caryn Curran DISCHARGE CAREGIVER [3] Daughter [21] [phone] Sommer Rodriguez DISCHARGE CAREGIVER [3] Sister [23] [phone] Patient Belongings  The following personal items are in your possession at time of discharge: 
  Dental Appliances: None  Visual Aid: None      Home Medications: None Jewelry: None  Clothing: With patient    Other Valuables: At bedside, With patient Discharge Instructions Attachments/References COPD: ADVANCE CARE PLANNING (ENGLISH) COPD: BREATHING TECHNIQUES (ENGLISH) COPD: EXACERBATION (ENGLISH) Patient Handouts Advance Care Planning With COPD: After Your Visit Your Care Instructions Taking care of yourself when you have chronic obstructive pulmonary disease (COPD) will help you feel better and live longer. But the disease gets worse over time. If your health is getting worse, you may want to make decisions about end-of-life care. Planning for the end of your life does not mean that you are giving up. It is a way to make sure that your wishes are met. Also, being clear about your wishes will make it easier for your loved ones. Making plans while you still can may ease your mind. It may help make your final days less stressful and give them more meaning. As part of your planning, it is a good idea to write advance directives. These are legal papers that tell doctors and family members your values and wishes for care at the end of your life. They may include a living will and a durable power of . You don't need a  to write these papers. Make sure that your doctor has a copy of these on file. And give a copy to a family member or close friend. You can have end-of-life care at home or in a nursing home. No matter where you are, hospice may be able to help with your care. Hospice provides pain relief. And it can give emotional and spiritual support to you and your family. You may work with a team of health professionals. The team may include doctors, nurses, social workers, and counselors. At all stages of your disease, your team can give treatment to manage your symptoms and keep you comfortable. Follow-up care is a key part of your treatment and safety.  Be sure to make and go to all appointments, and call your doctor if you are having problems. It's also a good idea to know your test results and keep a list of the medicines you take. How can you care for yourself at home? · Talk openly and honestly with your doctor. This is the best way to understand the decisions you will need to make as your health changes. Know that you can always change your mind. · Ask your doctor about life-support measures that are commonly used. These include tube feedings, breathing machines, and fluids given through a vein (IV). Understanding these treatments will help you decide whether you want them. · You may decide that you would like to have life-supporting treatments for a limited time. This allows a trial period to see if they will help you. You may also decide that you want your doctor to take only certain measures to keep you alive. It is important to spell out these conditions so that your doctor and family understand them. · Talk to your doctor about an estimate of how long you are likely to live. Your doctor likely will not be able to tell you exactly how long you have, but he or she may be able to give you a general time frame. He or she may also be able to point to things (such as oxygen needs and trouble caring for yourself) that cole the advance of the disease. And he or she can give you an idea about what usually happens with COPD. · Ask a friend or family member to make decisions for you when you no longer can. Talk to this person about the kinds of treatments you want and those that you do not want. Make sure that this person understands your wishes. If this person is not your durable power of  named in your advance directive, talk with your durable power of  too. · If you have not already done so, prepare a list of advance directives.  These are instructions to your doctor and family members about what kind of care you want if you can't speak or express yourself. Ask your doctor or your state health department for information on how to write advance directives. They may have forms you can use. ¨ Think about a do-not-resuscitate order, or DNR. This order asks that no extra treatments be used if your heart stops. Extra treatments include electrical shock to restart your heart, a machine to breathe for you, and medicines that can stimulate the heart. If you decide to have a DNR order, ask your doctor to write it. Place the order in your home where everyone can easily see it. ¨ Think about a living will. A living will explains your wishes in case you are in a coma or can't communicate. Living denton tell doctors to use or not use treatments that would keep you alive. ¨ Think about naming a person to make decisions about your care if you are not able to. This is called a durable power of . Most people ask a close friend or family member. Talk to this person about the kinds of treatments you want and those that you do not want. ¨ All of these forms are simple to change. Tell your doctor what you want to change. Ask him or her to make a note in your medical file. Give your family updated copies of the papers. When should you call for help? Be sure to contact your doctor if you have any questions. Where can you learn more? Go to NanoH2O.be Enter G673 in the search box to learn more about \"Advance Care Planning With COPD: After Your Visit. \"  
© 0258-8389 [employer]. Care instructions adapted under license by [employer] (which disclaims liability or warranty for this information). This care instruction is for use with your licensed healthcare professional. If you have questions about a medical condition or this instruction, always ask your healthcare professional. [address] any warranty or liability for your use of this information. Content Version: 78.4.048503; Current as of: September 30, 2013 Breathing Techniques for COPD: Care Instructions Your Care Instructions Breathing is hard when you have chronic obstructive pulmonary disease (COPD). You may take quick, short breaths. Breathing this way makes it harder to get air into your lungs. Learning new ways to control your breathing may help. You may feel better and be able to do more. You can try three basic ways to control your breathing. They are pursed-lip breathing, diaphragmatic breathing, and breathing while bending. Use these methods when you are more short of breath than normal. Practice them often so you can do them well. Follow-up care is a key part of your treatment and safety. Be sure to make and go to all appointments, and call your doctor if you are having problems. It's also a good idea to know your test results and keep a list of the medicines you take. How can you care for yourself at home? · Pursed-lip breathing helps you breathe more air out so that your next breath can be deeper. For this type of breathing, you breathe in through your nose and out through your mouth while almost closing your lips. Breathe in for about 2 seconds, and breathe out for 4 to 6 seconds. Pursed-lip breathing decreases shortness of breath and improves your ability to exercise. · Diaphragmatic breathing helps your lungs expand so that they take in more air. ¨ Lie on your back, or prop yourself up on several pillows. ¨ Put one hand on your belly and the other on your chest. When you breathe in, push your belly out as far as possible. You should feel the hand on your belly move out, while the hand on your chest does not move. ¨ When you breathe out, you should feel the hand on your belly move in. When you can do this type of breathing well while lying down, learn to do it while sitting or standing. Many people with COPD find this breathing method helpful. ¨ Practice diaphragmatic breathing for 20 minutes, 2 or 3 times a day. · Breathing while bending forward at the waist may make breathing easier. It can reduce shortness of breath while you exercise or rest. It helps the diaphragm move more easily. The diaphragm is a large muscle that separates your lungs from your belly. It helps draw air into your lungs as you breathe. · Do not smoke. Smoking makes COPD worse. If you need help quitting, talk to your doctor about stop-smoking programs and medicines. These can increase your chances of quitting for good. When should you call for help? Call your doctor now or seek immediate medical care if: 
? · Your breathing methods do not help. ? · Your shortness of breath gets worse. ? · You cough up blood. ? · You have swelling in your belly and legs. ? · You have severe chest pain. ? Watch closely for changes in your health, and be sure to contact your doctor if you have any problems. Where can you learn more? Go to http://etelvina-olivier.info/. Enter F687 in the search box to learn more about \"Breathing Techniques for COPD: Care Instructions. \" Current as of: May 12, 2017 Content Version: 11.4 © 7903-8294 [employer]. Care instructions adapted under license by [employer] (which disclaims liability or warranty for this information). If you have questions about a medical condition or this instruction, always ask your healthcare professional. [address] [address] any warranty or liability for your use of this information. Chronic Obstructive Pulmonary Disease (COPD) Flare-Ups: Care Instructions Your Care Instructions Chronic obstructive pulmonary disease (COPD) is a lung disease that makes it hard to breathe. It is caused by damage to the lungs over many years, usually from smoking. COPD is often a mix of two diseases: · Chronic bronchitis: The airways that carry air to the lungs (bronchial tubes) get inflamed and make a lot of mucus. This can narrow or block the airways. · Emphysema: In a healthy person, the tiny air sacs in the lungs are like balloons. As you breathe in and out, they get bigger and smaller to move air through your lungs. But with emphysema, these air sacs are damaged and lose their stretch. Less air gets in and out of the lungs. Many people with COPD have attacks called flare-ups or exacerbations. This is when your usual symptoms quickly get worse and stay worse. The doctor has checked you carefully. But problems can develop later. If you notice any problems or new symptoms, get medical treatment right away. Follow-up care is a key part of your treatment and safety. Be sure to make and go to all appointments, and call your doctor if you are having problems. It's also a good idea to know your test results and keep a list of the medicines you take. How can you care for yourself at home? · Be safe with medicines. Take your medicines exactly as prescribed. Call your doctor if you think you are having a problem with your medicine. You may be taking medicines such as: ¨ Bronchodilators. These help open your airways and make breathing easier. ¨ Corticosteroids. These reduce airway inflammation. They may be given as pills, in a vein, or in an inhaled form. You may go home with pills in addition to an inhaler that you already use. · A spacer may help you get more inhaled medicine to your lungs. Ask your doctor or pharmacist if a spacer is right for you. If it is, ask how to use it properly. · If your doctor prescribed antibiotics, take them as directed. Do not stop taking them just because you feel better. You need to take the full course of antibiotics. · If your doctor prescribed oxygen, use the flow rate your doctor has recommended. Do not change it without talking to your doctor first. 
· Do not smoke. Smoking makes COPD worse.  If you need help quitting, talk to your doctor about stop-smoking programs and medicines. These can increase your chances of quitting for good. When should you call for help? Call 911 anytime you think you may need emergency care. For example, call if: 
? · You have severe trouble breathing. ?Call your doctor now or seek immediate medical care if: 
? · You have new or worse trouble breathing. ? · Your coughing or wheezing gets worse. ? · You cough up dark brown or bloody mucus (sputum). ? · You have a new or higher fever. ? Watch closely for changes in your health, and be sure to contact your doctor if: 
? · You notice more mucus or a change in the color of your mucus. ? · You need to use your antibiotic or steroid pills. ? · You do not get better as expected. Where can you learn more? Go to http://etelvina-olivier.info/. Enter D878 in the search box to learn more about \"Chronic Obstructive Pulmonary Disease (COPD) Flare-Ups: Care Instructions. \" Current as of: May 12, 2017 Content Version: 11.4 © 3243-1802 [employer]. Care instructions adapted under license by [employer] (which disclaims liability or warranty for this information). If you have questions about a medical condition or this instruction, always ask your healthcare professional. [address] any warranty or liability for your use of this information. Please provide this summary of care documentation to your next provider. Signatures-by signing, you are acknowledging that this After Visit Summary has been reviewed with you and you have received a copy. Patient Signature:  ____________________________________________________________ Date:  ____________________________________________________________  
  
Lucy Seth Provider Signature:  ____________________________________________________________ Date:  ____________________________________________________________

## 2018-03-13 NOTE — PROGRESS NOTES
Spoke to Dr. Soniya Resendiz from [facility] pertaining to pt blood sugar of 418. MD changed insulin sensitivity to higher resistance. Pt received 15 units per protocol. Will follow up with repeat blood sugar.

## 2018-03-13 NOTE — ED NOTES
TRANSFER - OUT REPORT:    Verbal report given to JOSÉ MIGUEL Monreal(name) on [facility]  being transferred to (unit) for routine progression of care       Report consisted of patients Situation, Background, Assessment and   Recommendations(SBAR). Information from the following report(s) SBAR, ED Summary, Intake/Output, MAR, Recent Results and Cardiac Rhythm NSR was reviewed with the receiving nurse. Opportunity for questions and clarification was provided.       Patient transported with:   Monitor  O2 @ 3 liters  Registered Nurse

## 2018-03-13 NOTE — ED PROVIDER NOTES
EMERGENCY DEPARTMENT HISTORY AND PHYSICAL EXAM    9:49 AM      Date: 3/13/2018  Patient Name: Светлана Morin    History of Presenting Illness     Chief Complaint   Patient presents with    Respiratory Distress         History Provided By: Patient and EMS    Chief Complaint: sob  Duration:  Days  Timing:  Acute and Worsening  Location: chest  Quality: Tightness  Severity: Severe  Modifying Factors: inhaling exacerbates tightness feeling in chest  Associated Symptoms: cough      Additional History (Context): Светлана Morin is a 61 y.o. female with HTN, COPD, DM, asthma, CHF, and GERD who presents via EMS c/o SOB for the past 3 days that has become exacerbated last night. Per EMS on arrival to the pt home she was tripoding with inspiratory and expiratory wheezes. While in route to the ED the pt received combi treatment and albuterol, solumedrol 125mg, mag 2g; since arriving to the ED she has improved. The pt is on CPAP at night and 2L O2 PRN. Per the pt she  Was seen for theses symptoms on Friday by her PCP; says she wasn't prescribed steroids but was told to come back next week to see if there was any improvement or if it would get worse. Pt says her her chest feels tight and congested this exacerbated when she is taking a deep breath. She also reports a cough; says when she cough she feels like she gagging. Has not had any nausea or vomiting. Pt states \" it has felt like someone has been standing on her chest for the past 3 days\". She has a nebulizer at home but has not had any relief. Reported she was admitted in January; has not been admitted since she was discharged. Has not been on any ABx since the last time she was admitted. Last time she seen her pulmonologist was after being discharge; told her everything was fine. Denies being on lasix or blood thinners. Denies having hx of PE or DVT. Says she has been doing fine beside the SOB and her sugar running high.  Also reports her medications are supposed be changed soon but she is still taking all of her current medications as directed. States since receiving treatment on the ambulance she is feeling slightly better. The pt denies CP, fever, chills, nausea, vomiting, abdominal pain, weakness, and numbness. No other complaints or concerns in the ED. PCP: Kalie Zuniga MD    Current Outpatient Prescriptions   Medication Sig Dispense Refill    tiotropium bromide (SPIRIVA RESPIMAT) 1.25 mcg/actuation inhaler Take 2 Puffs by inhalation daily.  cpap machine kit by Does Not Apply route nightly.  insulin glargine (LANTUS) 100 unit/mL injection 20 Units by SubCUTAneous route nightly. Indications: type 2 diabetes mellitus 1 Vial 0    predniSONE (DELTASONE) 20 mg tablet Take 3 tablets for 3 days, take 2 tablets for 3 days, take 1 tablet for 3 days 18 Tab 0    pantoprazole (PROTONIX) 40 mg tablet Take 40 mg by mouth daily.  neomycin-polymyxin-hc (CORTISPORIN) 3.5-10,000-10 mg-unit-mg/mL ophthalmic suspension Administer 1 Drop to both eyes four (4) times daily as needed.  lisinopril (PRINIVIL, ZESTRIL) 20 mg tablet Take 20 mg by mouth two (2) times a day.  ipratropium (ATROVENT) 0.02 % nebulizer solution Take  by inhalation four (4) times daily as needed.  OXYGEN-AIR DELIVERY SYSTEMS 2 L by Nasal route continuous.  fluticasone-salmeterol (ADVAIR HFA) 230-21 mcg/actuation inhaler Take 2 Puffs by inhalation two (2) times a day.  Diabetic Supplies, [address] [facility] [place]abetic needles 1/2 inch 31 gauge - 1 injection daily 30 Each 5    Diabetic Supplies, [employer]isc[employer]. misc Diabetic syringes 1 mL - use one daily 30 Each 5    Diabetic Supplies, [employer]isc[employer]. misc Diabetic test strips - use three times daily 90 Each 5    Diabetic Supplies, [employer]iscell[employer].  misc Diabetic lancets - use three times daily 90 Each 5    insulin lispro (HUMALOG) 100 unit/mL injection Check sugars three times a day before meals, if blood sugar is greater than 250 give yourself 8 units of Humalog before eating. Indications: type 2 diabetes mellitus 1 Vial 2    aspirin delayed-release 81 mg tablet Take 81 mg by mouth daily.  famotidine (PEPCID) 20 mg tablet Take 20 mg by mouth two (2) times daily as needed.  guaiFENesin ER (MUCINEX) 600 mg ER tablet Take 600 mg by mouth two (2) times daily as needed for Congestion.  albuterol (PROVENTIL HFA, VENTOLIN HFA, PROAIR HFA) 90 mcg/actuation inhaler Take 2 Puffs by inhalation every four (4) hours as needed for Wheezing. For the next 2 days after hospital discharge, use your inhaler 4 times a day. 1 Inhaler 0    albuterol (PROVENTIL VENTOLIN) 2.5 mg /3 mL (0.083 %) nebulizer solution 2.5 mg by Nebulization route every four (4) hours as needed for Wheezing.  Inhalational Spacing Device (AEROCHAMBER) 1 Each by Does Not Apply route as needed for Cough or Other (COPD exacerbation). 1 Device 0    fluticasone (FLONASE) 50 mcg/actuation nasal spray 2 Sprays by Both Nostrils route daily.  montelukast (SINGULAIR) 10 mg tablet Take 10 mg by mouth nightly.          Past History     Past Medical History:  Past Medical History:   Diagnosis Date    Asthma     COPD     Cystocele, midline     Diabetes mellitus ([address]     GERD (gastroesophageal reflux disease)     Hidradenitis suppurativa     Hyperlipidemia     Hypertension     SHERRIE on CPAP     CPAP    Stress incontinence        Past Surgical History:  Past Surgical History:   Procedure Laterality Date    BREAST SURGERY PROCEDURE UNLISTED      Right breast benign tumor removal    HX APPENDECTOMY      HX CHOLECYSTECTOMY      HX DILATION AND CURETTAGE      Dysfunctional uterine bleeding, thought 2/2 fibroids    HX TUBAL LIGATION         Family History:  Family History   Problem Relation Age of Onset    Hypertension Mother     Stroke Mother     Breast Cancer Mother      Bilateral mastectomies    Cancer Mother      ovarian and breast    Heart Failure Mother     Heart Attack Father      2011    Heart Surgery Father      CABG    Heart Failure Father     COPD Sister      Heavy smoker    Cancer Sister      ovarian    Heart Failure Sister     Lung Disease Sister     Asthma Child     Cancer Maternal Aunt      pancreatic    Cancer Maternal Grandfather      stomach       Social History:  Social History   Substance Use Topics    Smoking status: Former Smoker     Packs/day: 1.00     Years: 30.00     Types: Cigarettes     Start date: 5/6/1966     Quit date: 9/6/2006    Smokeless tobacco: Never Used      Comment: 1-1.5 packs per day    Alcohol use No       Allergies: Allergies   Allergen Reactions    Ancef [Cefazolin] Hives    Contrast Agent [Iodine] Anaphylaxis, Shortness of Breath and Swelling     Needs pre-medication for IV contrast with Benadryl, Solu-Medrol    Fish Containing Products Anaphylaxis     Pt states she had a severe allergic reaction at 9 y/o.  Metformin Other (comments)     Abdominal pain, diarrhea.  Codeine Other (comments)     Altered mental status         Review of Systems       Review of Systems   Constitutional: Negative for chills and fever. Respiratory: Positive for cough, chest tightness and shortness of breath. Cardiovascular: Negative for chest pain and leg swelling. Gastrointestinal: Negative for abdominal distention, diarrhea, nausea and vomiting. Musculoskeletal: Negative for back pain and myalgias. All other systems reviewed and are negative. Physical Exam     Visit Vitals    /57    Pulse 93    Temp 97.9 °F (36.6 °C)    Resp 16    SpO2 91%         Physical Exam   Constitutional: She is oriented to person, place, and time. She appears well-developed and well-nourished. HENT:   Head: Normocephalic and atraumatic. Neck: Neck supple. No JVD present. Cardiovascular: Normal rate and regular rhythm. Pulmonary/Chest: Tachypnea noted. She is in respiratory distress.    Moderate expiratory wheeze with prolonged exp phase  Mild resp distress, able to speak 5-6 words at a time   Abdominal: Soft. She exhibits no distension. There is no tenderness. There is no rebound and no guarding. Musculoskeletal: She exhibits no edema. No calf tenderness   Neurological: She is alert and oriented to person, place, and time. Skin: Skin is warm and dry. No erythema. Psychiatric: Judgment normal.   Nursing note and vitals reviewed. Diagnostic Study Results     Labs -  Recent Results (from the past 12 hour(s))   CBC WITH AUTOMATED DIFF    Collection Time: 03/13/18  9:55 AM   Result Value Ref Range    WBC 6.5 4.6 - 13.2 K/uL    RBC 4.33 4.20 - 5.30 M/uL    HGB 13.3 12.0 - 16.0 g/dL    HCT 37.3 35.0 - 45.0 %    MCV 86.1 74.0 - 97.0 FL    MCH 30.7 24.0 - 34.0 PG    MCHC 35.7 31.0 - 37.0 g/dL    RDW 14.0 11.6 - 14.5 %    PLATELET 697 279 - 560 K/uL    MPV 8.6 (L) 9.2 - 11.8 FL    NEUTROPHILS 48 40 - 73 %    LYMPHOCYTES 43 21 - 52 %    MONOCYTES 7 3 - 10 %    EOSINOPHILS 2 0 - 5 %    BASOPHILS 0 0 - 2 %    ABS. NEUTROPHILS 3.1 1.8 - 8.0 K/UL    ABS. LYMPHOCYTES 2.8 0.9 - 3.6 K/UL    ABS. MONOCYTES 0.5 0.05 - 1.2 K/UL    ABS. EOSINOPHILS 0.2 0.0 - 0.4 K/UL    ABS. BASOPHILS 0.0 0.0 - 0.06 K/UL    DF AUTOMATED     NT-PRO BNP    Collection Time: 03/13/18  9:55 AM   Result Value Ref Range    NT pro-BNP 42 0 - 508 PG/ML   METABOLIC PANEL, COMPREHENSIVE    Collection Time: 03/13/18  9:55 AM   Result Value Ref Range    Sodium 137 136 - 145 mmol/L    Potassium 3.9 3.5 - 5.5 mmol/L    Chloride 101 100 - 108 mmol/L    CO2 28 21 - 32 mmol/L    Anion gap 8 3.0 - 18 mmol/L    Glucose 213 (H) 74 - 99 mg/dL    BUN 12 7.0 - 18 MG/DL    Creatinine 0.80 0.6 - 1.3 MG/DL    BUN/Creatinine ratio 15 12 - 20      GFR est AA >60 >60 ml/min/1.73m2    GFR est non-AA >60 >60 ml/min/1.73m2    Calcium 9.6 8.5 - 10.1 MG/DL    Bilirubin, total 0.7 0.2 - 1.0 MG/DL    ALT (SGPT) 34 13 - 56 U/L    AST (SGOT) 17 15 - 37 U/L    Alk.  phosphatase 81 45 - 117 U/L    Protein, total 7.1 6.4 - 8.2 g/dL    Albumin 3.6 3.4 - 5.0 g/dL    Globulin 3.5 2.0 - 4.0 g/dL    A-G Ratio 1.0 0.8 - 1.7     CARDIAC PANEL,(CK, CKMB & TROPONIN)    Collection Time: 03/13/18  9:55 AM   Result Value Ref Range    CK 99 26 - 192 U/L    CK - MB 1.3 <3.6 ng/ml    CK-MB Index 1.3 0.0 - 4.0 %    Troponin-I, Qt. <0.02 0.0 - 0.045 NG/ML       Radiologic Studies -   XR CHEST PORT   Final Result        No acute process    Medical Decision Making   I am the first provider for this patient. I reviewed the vital signs, available nursing notes, past medical history, past surgical history, family history and social history. Vital Signs-Reviewed the patient's vital signs. Pulse Oximetry Analysis -  3L of O2 via NC (Interpretation)    Cardiac Monitor:  Rate: 93  Rhythm:  Normal Sinus Rhythm     EKG: Interpreted by the EP. Time Interpreted: 2258   Rate: 89   Rhythm: Normal Sinus Rhythm    Interpretation: normal axis, normal intervals, q waves III, no ST changes   Comparison: unchanged from 1/23/18    Records Reviewed: Nursing Notes and Old Medical Records (Time of Review: 9:49 AM)    ED Course: Progress Notes, Reevaluation, and Consults:  11:04 AM The pt states she is feeling better. I have discussed with her I will keep her here for observation for 1 hour then revaluate to determine whether she is stable for D/C home or being kept for admission. 12:21 PM The pt states she os feeling better after treatment however when the pt attempted to use the bedside commode the pt became tachypneic and extremely SOB. Will contact [facility] for admission. 12:24 PM, 3/13/2018   Consult:  Discussed care with Dr. Olya Salomon with [facility]. Standard discussion; including history of patients chief complaint, available diagnostic results, and treatment course. Will accept the pt for admission. Provider Notes (Medical Decision Making): 62 y/o female presents with sob,.  Hx of copd and chf, will eval, screen for pna. No hx of dvt or PE, noted hypoxia, however seems less likely. Will re-eval after meds. For Hospitalized Patients:    1. Hospitalization Decision Time:  The decision to hospitalize the patient was made by Dr. Lam Murdock at 12:24pm on 3/13/2018    2. Aspirin: Aspirin was not given because the patient did not present with a stroke at the time of their Emergency Department evaluation    Diagnosis     Clinical Impression:   1. COPD exacerbation ([address]        Disposition: Admission. Attestations:  Katrin Rojas acting as a scribe for and in the presence of Devin Bhatt DO      March 13, 2018 at 9:49 AM       Provider Attestation:      I personally performed the services described in the documentation, reviewed the documentation, as recorded by the scribe in my presence, and it accurately and completely records my words and actions.  March 13, 2018 at 9:49 AM - Devin Bhatt DO    _______________________________

## 2018-03-13 NOTE — MED STUDENT NOTES
*ATTENTION:  This note has been created by a medical student for educational purposes only. Please do not refer to the content of this note for clinical decision-making, billing, or other purposes. Please see attending physicians note to obtain clinical information on this patient. *           Admission History and Physical  [facility]      Patient: Sky Disla MRN: [id number]  CSN: [id number]    YOB: 1960  Age: 61 y.o. Sex: female      DOA: 3/13/2018       HPI:     Sky Disla is a 61 y.o. female with PMH of multiple COPD exacerbations, HTN, T2DM, Asthma, CHF, GERD, SHERRIE, now with complaint of shortness of breath on exertion. She has had multiple COPD exacerbations in the past, which she says often flare up in the early months of the year, some of which require hospitalization. She states that she recently saw Dr. Maria Fernanda Nguyen on Friday 3/9/18 at the [facility] office. Since then, she has had shortness of breath for the past 3 days, which has been unbearable when she stands up and walks around. It was so bad last night that she could not use her CPAP machine. Finally, her shortness of breath got bad enough this morning that she called EMS. They gave her Solumedrol, duoneb, and magnesium in the ambulance which helped with her symptoms. She also reports mild chronic cough and fatigue associated with shortness of breath. She also endorses epigastric abdominal pain that is relatively constant, which has been worked up with an EGD in 8/2017. She denies any recent fever, sudden weight changes, chest pain, nausea, vomiting.     Past Medical History:   Diagnosis Date    Asthma     COPD     Cystocele, midline     Diabetes mellitus ([facility] [address]     GERD (gastroesophageal reflux disease)     Hidradenitis suppurativa     Hyperlipidemia     Hypertension     SHERRIE on CPAP     CPAP    Stress incontinence        Past Surgical History:   Procedure Laterality Date    BREAST SURGERY PROCEDURE UNLISTED      Right breast benign tumor removal    HX APPENDECTOMY      HX CHOLECYSTECTOMY      HX DILATION AND CURETTAGE      Dysfunctional uterine bleeding, thought 2/2 fibroids    HX TUBAL LIGATION         Family History   Problem Relation Age of Onset    Hypertension Mother     Stroke Mother     Breast Cancer Mother      Bilateral mastectomies    Cancer Mother      ovarian and breast    Heart Failure Mother     Heart Attack Father      2011    Heart Surgery Father      CABG    Heart Failure Father     COPD Sister      Heavy smoker    Cancer Sister      ovarian    Heart Failure Sister     Lung Disease Sister     Asthma Child     Cancer Maternal Aunt      pancreatic    Cancer Maternal Grandfather      stomach       Social History     Social History    Marital status: LEGALLY      Spouse name: N/A    Number of children: N/A    Years of education: N/A     Social History Main Topics    Smoking status: Former Smoker     Packs/day: 1.00     Years: 30.00     Types: Cigarettes     Start date: 5/6/1966     Quit date: 9/6/2006    Smokeless tobacco: Never Used      Comment: 1-1.5 packs per day    Alcohol use No    Drug use: No    Sexual activity: No     Other Topics Concern    Not on file     Social History Narrative   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 She is now retired. She lives in [place] and has been for most of her life. Prior to Admission medications    Medication Sig Start Date End Date Taking? Authorizing Provider   tiotropium bromide (SPIRIVA RESPIMAT) 1.25 mcg/actuation inhaler Take 2 Puffs by inhalation daily. Historical Provider   cpap machine kit by Does Not Apply route nightly. Historical Provider   insulin glargine (LANTUS) 100 unit/mL injection 20 Units by SubCUTAneous route nightly.  Indications: type 2 diabetes mellitus 1/26/18   Radha Estrada MD   predniSONE (DELTASONE) 20 mg tablet Take 3 tablets for 3 days, take 2 tablets for 3 days, take 1 tablet for 3 days 1/26/18   Lyndon Wolfe MD   pantoprazole (PROTONIX) 40 mg tablet Take 40 mg by mouth daily. Historical Provider   neomycin-polymyxin-hc (CORTISPORIN) 3.5-10,000-10 mg-unit-mg/mL ophthalmic suspension Administer 1 Drop to both eyes four (4) times daily as needed. Historical Provider   lisinopril (PRINIVIL, ZESTRIL) 20 mg tablet Take 20 mg by mouth two (2) times a day. Historical Provider   ipratropium (ATROVENT) 0.02 % nebulizer solution Take  by inhalation four (4) times daily as needed. 5/10/16   Historical Provider   OXYGEN-AIR DELIVERY SYSTEMS 2 L by Nasal route continuous. Historical Provider   fluticasone-salmeterol (ADVAIR HFA) 574-90 mcg/actuation inhaler Take 2 Puffs by inhalation two (2) times a day. Historical Provider   Diabetic Supplies, [address] [facility] [place] needles 1/2 inch 31 gauge - 1 injection daily 2/14/17   Sejal Lanier, DO   Diabetic Supplies, [address] [facility] [place]abetic syringes 1 mL - use one daily 2/14/17   Sejal Lanier, DO   Diabetic Supplies, [address] [facility] [place] Diabetic test strips - use three times daily 2/14/17   Sejal Lanier, DO   Diabetic Supplies, [address] [facility] [place] Diabetic lancets - use three times daily 2/14/17   Sejal Lanier, DO   insulin lispro (HUMALOG) 100 unit/mL injection Check sugars three times a day before meals, if blood sugar is greater than 250 give yourself 8 units of Humalog before eating. Indications: type 2 diabetes mellitus 2/14/17   Sejal Lanier,    aspirin delayed-release 81 mg tablet Take 81 mg by mouth daily. Historical Provider   famotidine (PEPCID) 20 mg tablet Take 20 mg by mouth two (2) times daily as needed. Historical Provider   guaiFENesin ER (MUCINEX) 600 mg ER tablet Take 600 mg by mouth two (2) times daily as needed for Congestion.     Historical Provider   albuterol (PROVENTIL HFA, VENTOLIN HFA, PROAIR HFA) 90 mcg/actuation inhaler Take 2 Puffs by inhalation every four (4) hours as needed for Wheezing. For the next 2 days after hospital discharge, use your inhaler 4 times a day. 3/9/16   Pina Maxwell MD   albuterol (PROVENTIL VENTOLIN) 2.5 mg /3 mL (0.083 %) nebulizer solution 2.5 mg by Nebulization route every four (4) hours as needed for Wheezing. Historical Provider   Inhalational Spacing Device (AEROCHAMBER) 1 Each by Does Not Apply route as needed for Cough or Other (COPD exacerbation). 10/2/12   Danitza South,    fluticasone (FLONASE) 50 mcg/actuation nasal spray 2 Sprays by Both Nostrils route daily. Historical Provider   montelukast (SINGULAIR) 10 mg tablet Take 10 mg by mouth nightly. Lauren Allison MD       Allergies   Allergen Reactions    Ancef [Cefazolin] Hives    Contrast Agent [Iodine] Anaphylaxis, Shortness of Breath and Swelling     Needs pre-medication for IV contrast with Benadryl, Solu-Medrol    Fish Containing Products Anaphylaxis     Pt states she had a severe allergic reaction at 7 y/o.  Metformin Other (comments)     Abdominal pain, diarrhea.     Codeine Other (comments)     Altered mental status       Review of Systems  Constitutional: negative except for fatigue  Eyes: negative for visual disturbance  Ears, Nose, Mouth, Throat, and Face: negative except for nasal congestion and snoring  Respiratory: positive for cough, asthma, wheezing or dyspnea on exertion  Cardiovascular: negative except for none, chest pressure/discomfort, dyspnea  Gastrointestinal: positive for reflux symptoms and abdominal pain  Genitourinary:negative  Integument: negative  Hematologic/Lymphatic: negative  Musculoskeletal: Negative except for tightness along lateral chest wall  Neurological: negative for headaches  Behavioral/Psychiatric: Negative      Physical Exam:      Visit Vitals    /66    Pulse 92    Temp 97.9 °F (36.6 °C)    Resp 25    SpO2 96%       Physical Exam:  General:  Well developed, well nourished, sitting up in bed breathing heavily  HEENT:  NCAT. Conjunctiva pink, sclera anicteric. EOMI. Pharynx moist, nonerythematous. Moist mucous membranes. CV:  RRR, no mumurs. RESP:  Mild wheezing bilaterally in lower lungs. Able to speak 3-4 words without stopping to take a breath. ABD:  Soft, nontender, nondistended. Normoactive bowel sounds. RECTAL:  Not performed  MS:  No joint deformity. Neuro:   CN II-XII intact. Alert and Oriented x3. Ext:  2+ radial and dp pulses bilaterally. Skin:  No rashes or lesions. Labs:  Recent Results (from the past 24 hour(s))   CBC WITH AUTOMATED DIFF    Collection Time: 03/13/18  9:55 AM   Result Value Ref Range    WBC 6.5 4.6 - 13.2 K/uL    RBC 4.33 4.20 - 5.30 M/uL    HGB 13.3 12.0 - 16.0 g/dL    HCT 37.3 35.0 - 45.0 %    MCV 86.1 74.0 - 97.0 FL    MCH 30.7 24.0 - 34.0 PG    MCHC 35.7 31.0 - 37.0 g/dL    RDW 14.0 11.6 - 14.5 %    PLATELET 240 227 - 369 K/uL    MPV 8.6 (L) 9.2 - 11.8 FL    NEUTROPHILS 48 40 - 73 %    LYMPHOCYTES 43 21 - 52 %    MONOCYTES 7 3 - 10 %    EOSINOPHILS 2 0 - 5 %    BASOPHILS 0 0 - 2 %    ABS. NEUTROPHILS 3.1 1.8 - 8.0 K/UL    ABS. LYMPHOCYTES 2.8 0.9 - 3.6 K/UL    ABS. MONOCYTES 0.5 0.05 - 1.2 K/UL    ABS. EOSINOPHILS 0.2 0.0 - 0.4 K/UL    ABS.  BASOPHILS 0.0 0.0 - 0.06 K/UL    DF AUTOMATED     NT-PRO BNP    Collection Time: 03/13/18  9:55 AM   Result Value Ref Range    NT pro-BNP 42 0 - 567 PG/ML   METABOLIC PANEL, COMPREHENSIVE    Collection Time: 03/13/18  9:55 AM   Result Value Ref Range    Sodium 137 136 - 145 mmol/L    Potassium 3.9 3.5 - 5.5 mmol/L    Chloride 101 100 - 108 mmol/L    CO2 28 21 - 32 mmol/L    Anion gap 8 3.0 - 18 mmol/L    Glucose 213 (H) 74 - 99 mg/dL    BUN 12 7.0 - 18 MG/DL    Creatinine 0.80 0.6 - 1.3 MG/DL    BUN/Creatinine ratio 15 12 - 20      GFR est AA >60 >60 ml/min/1.73m2    GFR est non-AA >60 >60 ml/min/1.73m2    Calcium 9.6 8.5 - 10.1 MG/DL    Bilirubin, total 0.7 0.2 - 1.0 MG/DL    ALT (SGPT) 34 13 - 56 U/L    AST (SGOT) 17 15 - 37 U/L    Alk. phosphatase 81 45 - 117 U/L    Protein, total 7.1 6.4 - 8.2 g/dL    Albumin 3.6 3.4 - 5.0 g/dL    Globulin 3.5 2.0 - 4.0 g/dL    A-G Ratio 1.0 0.8 - 1.7     CARDIAC PANEL,(CK, CKMB & TROPONIN)    Collection Time: 03/13/18  9:55 AM   Result Value Ref Range    CK 99 26 - 192 U/L    CK - MB 1.3 <3.6 ng/ml    CK-MB Index 1.3 0.0 - 4.0 %    Troponin-I, Qt. <0.02 0.0 - 0.045 NG/ML   EKG, 12 LEAD, INITIAL    Collection Time: 03/13/18 10:27 AM   Result Value Ref Range    Ventricular Rate 89 BPM    Atrial Rate 89 BPM    P-R Interval 146 ms    QRS Duration 82 ms    Q-T Interval 378 ms    QTC Calculation (Bezet) 459 ms    Calculated P Axis 48 degrees    Calculated R Axis 23 degrees    Calculated T Axis 41 degrees    Diagnosis       Normal sinus rhythm  Cannot rule out Anterior infarct (cited on or before 23-JAN-2018)  Abnormal ECG  When compared with ECG of 23-JAN-2018 19:37,  No significant change was found       EKG shows normal sinus rhythm with similar abnormal findings compared to EKG taken on 1/23/18  CXR shows no acute process with mild lower lung scarring    Assessment/Plan:   61 y.o. female with PMH of multiple COPD exacerbations, Asthma, HTN, T2DM, CHF, GERD, and SHERRIE, now admitted with dyspnea on exertion and suspected COPD exacerbation. She has been treated with Duoneb, Solumedrol 125 mg, and magnesium 2 g with mild resolution of symptoms. She is having persistent dyspnea with exertion and at rest.    Plan:  1. Admit to inpatient service for likely COPD exacerbation  2. Azithromycin 500 mg PO once followed by 250 mg PO on following days for acute bacterial infection prevention secondary to COPD exacerbation  3. Continue Supplemental Oxygen 2LNC with target SpO2 of 88-92%  4. Regular diet  5. Continue home medications  6. Duoneb Q4hrs as needed for breakthrough dyspnea  7.  Thromboprophylaxis with Lovenox 40 mg QD    Shelley Weller MS3  3/13/2018, 2:57 PM

## 2018-03-13 NOTE — H&P
Admission History and Physical  [facility]      Patient: Narciso Chakraborty MRN: [id number]  CSN: [id number]    YOB: 1960  Age: 61 y.o. Sex: female      DOA: 3/13/2018       HPI:     Narciso Chakraborty is a 61 y.o. female with PMH , now presenting with complaint of increased shortness of breath not relieved by home nebulized albuterol and ipratropium. Pt endorses one week dry cough that has worsened progressively over the last 3-4 days. L:ast ngiht, she was not villa to ambulate more than 10 feet, so she began her PRN albuterol and ipratropium nebulizer. She endorses compliance with her medicaitons and was recently started on her hospital formulary medications, but she us using her remaining COPD meds at home. She denies any sick or flu contacts. She notes bilateral chest discomfort with inspiration, but notes some imporvement afetr her first duoneb in the emergency department. ED Course:   EMS: Albuterol, Duoneb, Mg, and 125 Solumedrol  ED: Duoneb on arrival  EKG- No ischemic changes  BNP: WNL   NEG Troponin      Review of Systems   Constitutional: Negative for chills and fever. HENT: Negative for congestion and sore throat. Eyes: Negative for blurred vision and double vision. Respiratory: Negative for cough. Cardiovascular: Negative for chest pain and palpitations. Notes chest comfort with rubber bandlike squeeze bilaterally with inspiration   Gastrointestinal: Negative for abdominal pain, blood in stool, constipation, diarrhea, heartburn, nausea and vomiting. Genitourinary: Negative for flank pain. Musculoskeletal: Negative for joint pain and myalgias. Notes distal peripheral neuropathy in feet(chronic)   Neurological: Negative for dizziness, sensory change, focal weakness and headaches.         Past Medical History:   Diagnosis Date    Asthma     COPD     Cystocele, midline     Diabetes mellitus (HCC)     GERD (gastroesophageal reflux disease)     Hidradenitis suppurativa     Hyperlipidemia     Hypertension     SHERRIE on CPAP     CPAP    Stress incontinence        Past Surgical History:   Procedure Laterality Date    BREAST SURGERY PROCEDURE UNLISTED      Right breast benign tumor removal    HX APPENDECTOMY      HX CHOLECYSTECTOMY      HX DILATION AND CURETTAGE      Dysfunctional uterine bleeding, thought 2/2 fibroids    HX TUBAL LIGATION         Family History   Problem Relation Age of Onset    Hypertension Mother     Stroke Mother     Breast Cancer Mother      Bilateral mastectomies    Cancer Mother      ovarian and breast    Heart Failure Mother     Heart Attack Father      2011    Heart Surgery Father      CABG    Heart Failure Father     COPD Sister      Heavy smoker    Cancer Sister      ovarian    Heart Failure Sister     Lung Disease Sister     Asthma Child     Cancer Maternal Aunt      pancreatic    Cancer Maternal Grandfather      stomach       Social History     Social History    Marital status: LEGALLY      Spouse name: N/A    Number of children: N/A    Years of education: N/A     Social History Main Topics    Smoking status: Former Smoker     Packs/day: 1.00     Years: 30.00     Types: Cigarettes     Start date: 5/6/1966     Quit date: 9/6/2006    Smokeless tobacco: Never Used      Comment: 1-1.5 packs per day    Alcohol use No    Drug use: No    Sexual activity: No     Other Topics Concern    Not on file     Social History Narrative       Allergies   Allergen Reactions    Ancef [Cefazolin] Hives    Contrast Agent [Iodine] Anaphylaxis, Shortness of Breath and Swelling     Needs pre-medication for IV contrast with Benadryl, Solu-Medrol    Fish Containing Products Anaphylaxis     Pt states she had a severe allergic reaction at 7 y/o.  Metformin Other (comments)     Abdominal pain, diarrhea.     Codeine Other (comments)     Altered mental status       Prior to Admission Medications   Prescriptions Last Dose Informant Patient Reported? Taking? Diabetic Supplies, [address] [facility] [place], [place]   No No   Sig: Diabetic needles 1/2 inch 31 gauge - 1 injection daily   Diabetic Supplies, Miscellan. misc   No No   Sig: Diabetic syringes 1 mL - use one daily   Diabetic Supplies, Miscellan. misc   No No   Sig: Diabetic test strips - use three times daily   Diabetic Supplies, Miscellan. misc   No No   Sig: Diabetic lancets - use three times daily   Inhalational Spacing Device (AEROCHAMBER)   No No   Si Each by Does Not Apply route as needed for Cough or Other (COPD exacerbation). OXYGEN-AIR DELIVERY SYSTEMS   Yes No   Si L by Nasal route continuous. albuterol (PROVENTIL HFA, VENTOLIN HFA, PROAIR HFA) 90 mcg/actuation inhaler   No No   Sig: Take 2 Puffs by inhalation every four (4) hours as needed for Wheezing. For the next 2 days after hospital discharge, use your inhaler 4 times a day. albuterol (PROVENTIL VENTOLIN) 2.5 mg /3 mL (0.083 %) nebulizer solution   Yes No   Si.5 mg by Nebulization route every four (4) hours as needed for Wheezing. aspirin delayed-release 81 mg tablet   Yes No   Sig: Take 81 mg by mouth daily. cpap machine kit   Yes No   Sig: by Does Not Apply route nightly. famotidine (PEPCID) 20 mg tablet   Yes No   Sig: Take 20 mg by mouth two (2) times daily as needed. fluticasone (FLONASE) 50 mcg/actuation nasal spray   Yes No   Si Sprays by Both Nostrils route daily. fluticasone-salmeterol (ADVAIR HFA) 230-21 mcg/actuation inhaler   Yes No   Sig: Take 2 Puffs by inhalation two (2) times a day. guaiFENesin ER (MUCINEX) 600 mg ER tablet   Yes No   Sig: Take 600 mg by mouth two (2) times daily as needed for Congestion. insulin glargine (LANTUS) 100 unit/mL injection   No No   Si Units by SubCUTAneous route nightly.  Indications: type 2 diabetes mellitus   insulin lispro (HUMALOG) 100 unit/mL injection   No No   Sig: Check sugars three times a day before meals, if blood sugar is greater than 250 give yourself 8 units of Humalog before eating. Indications: type 2 diabetes mellitus   ipratropium (ATROVENT) 0.02 % nebulizer solution   Yes No   Sig: Take  by inhalation four (4) times daily as needed. lisinopril (PRINIVIL, ZESTRIL) 20 mg tablet   Yes No   Sig: Take 20 mg by mouth two (2) times a day. montelukast (SINGULAIR) 10 mg tablet   Yes No   Sig: Take 10 mg by mouth nightly. neomycin-polymyxin-hc (CORTISPORIN) 3.5-10,000-10 mg-unit-mg/mL ophthalmic suspension   Yes No   Sig: Administer 1 Drop to both eyes four (4) times daily as needed. pantoprazole (PROTONIX) 40 mg tablet   Yes No   Sig: Take 40 mg by mouth daily. predniSONE (DELTASONE) 20 mg tablet   No No   Sig: Take 3 tablets for 3 days, take 2 tablets for 3 days, take 1 tablet for 3 days   tiotropium bromide (SPIRIVA RESPIMAT) 1.25 mcg/actuation inhaler   Yes No   Sig: Take 2 Puffs by inhalation daily. Facility-Administered Medications: None       Physical Exam:     Patient Vitals for the past 24 hrs:   Temp Pulse Resp BP SpO2   03/13/18 1330 - 92 25 142/66 96 %   03/13/18 1315 - 93 23 136/88 96 %   03/13/18 1300 - 92 27 132/60 96 %   03/13/18 1245 - 92 24 123/60 95 %   03/13/18 1235 - - - - 96 %   03/13/18 1230 - 88 24 129/59 95 %   03/13/18 1215 - 89 24 134/61 95 %   03/13/18 1200 - 82 20 124/47 96 %   03/13/18 1145 - 85 22 110/47 94 %   03/13/18 1130 - 86 19 116/50 94 %   03/13/18 1115 - 88 19 139/52 95 %   03/13/18 1100 - 88 17 124/52 95 %   03/13/18 1045 - 88 23 126/60 95 %   03/13/18 1030 - 90 26 143/55 95 %   03/13/18 1015 - 91 20 133/55 97 %   03/13/18 1000 97.9 °F (36.6 °C) - - - -   03/13/18 1000 - 88 17 135/54 96 %   03/13/18 0956 - 93 16 - 91 %   03/13/18 0955 - - - 146/57 -       Physical Exam   Constitutional: She is oriented to person, place, and time. She appears distressed. Speaking in 1-2 word responses with SOB,    HENT:   Head: Normocephalic and atraumatic. Eyes: Conjunctivae and EOM are normal. No scleral icterus. Neck: Normal range of motion. Neck supple. No JVD present. No tracheal deviation present. No thyromegaly present. Cardiovascular: Normal rate, regular rhythm, normal heart sounds and intact distal pulses. Exam reveals no gallop and no friction rub. No murmur heard. Pulmonary/Chest: Breath sounds normal. She is in respiratory distress. Tahcypnic    Abdominal: Soft. Bowel sounds are normal. She exhibits no distension and no mass. There is no tenderness. There is no rebound and no guarding. No hernia. Musculoskeletal: She exhibits no edema, tenderness or deformity. Lymphadenopathy:     She has no cervical adenopathy. Neurological: She is alert and oriented to person, place, and time. Able to move all extremities with equal 5/5 strength, no facial asymmetry   Skin: She is not diaphoretic. Psychiatric: She has a normal mood and affect. IMAGING:     Xr Chest Port    Result Date: 3/13/2018  Impression: 1. Scarring in the lung bases. No acute infiltrate appreciated. Recent Results (from the past 12 hour(s))   CBC WITH AUTOMATED DIFF    Collection Time: 03/13/18  9:55 AM   Result Value Ref Range    WBC 6.5 4.6 - 13.2 K/uL    RBC 4.33 4.20 - 5.30 M/uL    HGB 13.3 12.0 - 16.0 g/dL    HCT 37.3 35.0 - 45.0 %    MCV 86.1 74.0 - 97.0 FL    MCH 30.7 24.0 - 34.0 PG    MCHC 35.7 31.0 - 37.0 g/dL    RDW 14.0 11.6 - 14.5 %    PLATELET 263 602 - 399 K/uL    MPV 8.6 (L) 9.2 - 11.8 FL    NEUTROPHILS 48 40 - 73 %    LYMPHOCYTES 43 21 - 52 %    MONOCYTES 7 3 - 10 %    EOSINOPHILS 2 0 - 5 %    BASOPHILS 0 0 - 2 %    ABS. NEUTROPHILS 3.1 1.8 - 8.0 K/UL    ABS. LYMPHOCYTES 2.8 0.9 - 3.6 K/UL    ABS. MONOCYTES 0.5 0.05 - 1.2 K/UL    ABS. EOSINOPHILS 0.2 0.0 - 0.4 K/UL    ABS.  BASOPHILS 0.0 0.0 - 0.06 K/UL    DF AUTOMATED     NT-PRO BNP    Collection Time: 03/13/18  9:55 AM   Result Value Ref Range    NT pro-BNP 42 0 - 491 PG/ML   METABOLIC PANEL, COMPREHENSIVE    Collection Time: 03/13/18  9:55 AM   Result Value Ref Range    Sodium 137 136 - 145 mmol/L    Potassium 3.9 3.5 - 5.5 mmol/L    Chloride 101 100 - 108 mmol/L    CO2 28 21 - 32 mmol/L    Anion gap 8 3.0 - 18 mmol/L    Glucose 213 (H) 74 - 99 mg/dL    BUN 12 7.0 - 18 MG/DL    Creatinine 0.80 0.6 - 1.3 MG/DL    BUN/Creatinine ratio 15 12 - 20      GFR est AA >60 >60 ml/min/1.73m2    GFR est non-AA >60 >60 ml/min/1.73m2    Calcium 9.6 8.5 - 10.1 MG/DL    Bilirubin, total 0.7 0.2 - 1.0 MG/DL    ALT (SGPT) 34 13 - 56 U/L    AST (SGOT) 17 15 - 37 U/L    Alk. phosphatase 81 45 - 117 U/L    Protein, total 7.1 6.4 - 8.2 g/dL    Albumin 3.6 3.4 - 5.0 g/dL    Globulin 3.5 2.0 - 4.0 g/dL    A-G Ratio 1.0 0.8 - 1.7     CARDIAC PANEL,(CK, CKMB & TROPONIN)    Collection Time: 03/13/18  9:55 AM   Result Value Ref Range    CK 99 26 - 192 U/L    CK - MB 1.3 <3.6 ng/ml    CK-MB Index 1.3 0.0 - 4.0 %    Troponin-I, Qt. <0.02 0.0 - 0.045 NG/ML   EKG, 12 LEAD, INITIAL    Collection Time: 03/13/18 10:27 AM   Result Value Ref Range    Ventricular Rate 89 BPM    Atrial Rate 89 BPM    P-R Interval 146 ms    QRS Duration 82 ms    Q-T Interval 378 ms    QTC Calculation (Bezet) 459 ms    Calculated P Axis 48 degrees    Calculated R Axis 23 degrees    Calculated T Axis 41 degrees    Diagnosis       Normal sinus rhythm  Cannot rule out Anterior infarct (cited on or before 23-JAN-2018)  Abnormal ECG  When compared with ECG of 23-JAN-2018 19:37,  No significant change was found           Assessment/Plan:   61 y.o. female with PMH COPD, Asthma, and Diabetes, now admitted with COPD Exacerbation. Moderate COPD Exacerbation with hx of asthma, Comolicated due to multiple admissiom  Shortness of Breatth DDX includes: COPD Exacerbation vs. Flu vs. PE vs. ACS  Pt notes progressive dry cough and fatigue over the last severl days . Endorses compliance and access to medications. S/P Reported magnesium,Duonebx2,Magnesium, and Solumedrol.  CXR showed bilateral opacities, but no infiltrate  -Admit to Telemetry  -Continue NC Humidified with goal O2 of 88-92%  -Azithromycin 500mg IV x1, then start   -Solumedrol 60 Q8 for 2 doses  -PO prednisone 60 tomorrow  -Duoneb Q3H RT  -Continue Home atrovent and Advair  -Continue home Singulair  -PM Respiratory Check by Night Team  -Consider ABG if no improvement  -Rapid Flu      SHERRIE:  -Titrate to home dose per RT or bring in CPAP from home      Diabetes-Controlled with 3/18 A1c of 7.7  -Lantus 25 untis QHS  -SSI    HTN-Stable  -Lisinopril 40    GERD  -Omeprazole daily  -Famotidine PRN    Diet: Diabetic  DVT Prophylaxis: Lovenox  Code Status: Full  Point of Contact:   Herve Avery [phone]    Disposition and anticipated LOS: >4 Midnights, Expect discharge home with home health    oSfy Franz MD PGY-1  [address] [facility] Medicine               Senior Resident History and Physical  [facility]    HPI:     Narciso Chakraborty is a 61 y.o. female with a PMHx of COPD, Asthma, HTN, Type II DM, and GERD who came into the ED with complaints of shortness of breath. Ms. Jess Ferguson complains of shortness of breath that has been increasing for the last 3 days that has become significantly worse last night and through this morning despite regular breathing treatments and use of her home COPD medications. She called EMS this am who noted that she was tripoding with diffuse inspiratory and expiratory wheezing. Ms. Jess Ferguson received 2 breathing treatments along with Solu-medrol and magnesium en route to the hospital, along with another breathing treatment after her arrival in the ED. Ms. Jess Ferguson improved somewhat after her treatments, but when she got up to walk around, she became acutely short of breath and hypoxic again. Yash Ko is now admitted with an acute exacerbation of COPD.    ROS, PMH, PSH, Family Hx, Social Hx, home medications, and allergies as above in intern H&P.  I agree with history as above. Physical Exam:     Visit Vitals    /68 (BP 1 Location: Left arm, BP Patient Position: At rest)    Pulse 99    Temp 97.9 °F (36.6 °C)    Resp 22    Ht 5' 3\" (1.6 m)    Wt 114.7 kg (252 lb 12.8 oz)    SpO2 95%    BMI 44.78 kg/m2       General:  WD, WN, NAD  HEENT:  NCAT. Conjunctiva pink, sclera anicteric. EOMI. Pharynx moist, nonerythematous. Moist mucous membranes. No cervical, supraclavicular, or submandibular lymphadenopathy. CV:  RRR, no mumurs. PMI not displaced. RESP:  Labored breathing. Scattered expiratory wheezing, scattered rhonchi present bilaterally. ABD: Soft, nontender, nondistended. Normoactive bowel sounds. MS:  No joint deformity or instability. No atrophy. Neuro:  5/5 strength bilateral upper extremities and lower extremities. A+Ox3. Ext: No edema. 2+ radial and dp pulses bilaterally. Skin:  No rashes, lesions, or ulcers. Good turgor. Imaging    CXR 3/13/18   Impression:  1. Scarring in the lung bases. No acute infiltrate appreciated    Assessment/Plan:   Dalila Vazquez is a 61 y.o. female with a PMHx of COPD, Asthma, HTN, Type II DM, and GERD, is now admitted with an acute exacerbation of COPD. Acute Exacerbation of COPD w/Chronic Hypoxic Respiratory Failure- Pt is followed by Dr. Ishmael Araya of Pulmonology;  Has moderate to severe COPD on home O2 at 2 LPM with any activity; last admission in 1/2018; low dose CT Chest 11/2016 showed 0.4 cm ground glass density, read as benign, rec'd 12 mo f/u  - Will check Influenza A&B   - Continue home O2 at 2 LPM continuous   - Continue home Advair 2 puffs BID  - Continue home Atrovent (ipratropium) 2 puffs QID   - Continue home Montelukast 10 mg daily   - Continue home Flonase PRN   - Duo-Nebs q3hrs per RT   - Incentive spirometry   - Solu-Medrol 60 mg q8hrs for 2 more doses  - Prednisone 60 mg daily starting tomorrow for 4 days   - Azithromycin 500 mg IV today, 250 mg for 4 days   - Will call Pulmonology and make aware     Type II DM- Last HbA1c 7.3 in 1/2018; Pt previously on Metformin but unable to tolerate due to diarrhea and possible lactic acidosis that developed   - Continue home Lantus 25 units nightly   - SSI (high sensitivity) w/Accuchecks ACHS     HTN, Hx diastolic CHF- BP Not well controlled as outpatient; Last TTE showed EF 60% with G1DD    - Continue home ASA 81 mg daily  - Continue home Lisinopril 40 mg daily      SHERRIE  - CPAP per RT      GERD  - Continue home Protonix 40 mg daily   - Continue Pepcid 20 mg BID PRN       *Please see intern note above for additional chronic disease mgmt.     Crystal Rueda DO   [facility] [place]   3/13/2018

## 2018-03-13 NOTE — ED TRIAGE NOTES
Pt brought to ED via medic for Respiratory distress. HX COPD and CHF. Pt was given 1albuterol, 1 duoneb, 2mg Mag, and 125 of solumedrol.

## 2018-03-14 LAB
ANION GAP SERPL CALC-SCNC: 9 MMOL/L (ref 3–18)
ATRIAL RATE: 89 BPM
BASOPHILS # BLD: 0 K/UL (ref 0–0.1)
BASOPHILS NFR BLD: 0 % (ref 0–2)
BUN SERPL-MCNC: 17 MG/DL (ref 7–18)
BUN/CREAT SERPL: 16 (ref 12–20)
CALCIUM SERPL-MCNC: 9.2 MG/DL (ref 8.5–10.1)
CALCULATED P AXIS, ECG09: 48 DEGREES
CALCULATED R AXIS, ECG10: 23 DEGREES
CALCULATED T AXIS, ECG11: 41 DEGREES
CHLORIDE SERPL-SCNC: 103 MMOL/L (ref 100–108)
CO2 SERPL-SCNC: 26 MMOL/L (ref 21–32)
CREAT SERPL-MCNC: 1.04 MG/DL (ref 0.6–1.3)
DIAGNOSIS, 93000: NORMAL
DIFFERENTIAL METHOD BLD: ABNORMAL
EOSINOPHIL # BLD: 0 K/UL (ref 0–0.4)
EOSINOPHIL NFR BLD: 0 % (ref 0–5)
ERYTHROCYTE [DISTWIDTH] IN BLOOD BY AUTOMATED COUNT: 13.9 % (ref 11.6–14.5)
GLUCOSE BLD STRIP.AUTO-MCNC: 301 MG/DL (ref 70–110)
GLUCOSE BLD STRIP.AUTO-MCNC: 312 MG/DL (ref 70–110)
GLUCOSE BLD STRIP.AUTO-MCNC: 336 MG/DL (ref 70–110)
GLUCOSE BLD STRIP.AUTO-MCNC: 393 MG/DL (ref 70–110)
GLUCOSE SERPL-MCNC: 263 MG/DL (ref 74–99)
HCT VFR BLD AUTO: 36 % (ref 35–45)
HGB BLD-MCNC: 12.2 G/DL (ref 12–16)
LYMPHOCYTES # BLD: 0.8 K/UL (ref 0.9–3.6)
LYMPHOCYTES NFR BLD: 7 % (ref 21–52)
MCH RBC QN AUTO: 30 PG (ref 24–34)
MCHC RBC AUTO-ENTMCNC: 33.9 G/DL (ref 31–37)
MCV RBC AUTO: 88.7 FL (ref 74–97)
MONOCYTES # BLD: 0.1 K/UL (ref 0.05–1.2)
MONOCYTES NFR BLD: 1 % (ref 3–10)
NEUTS SEG # BLD: 9.7 K/UL (ref 1.8–8)
NEUTS SEG NFR BLD: 92 % (ref 40–73)
P-R INTERVAL, ECG05: 146 MS
PLATELET # BLD AUTO: 285 K/UL (ref 135–420)
PMV BLD AUTO: 9.2 FL (ref 9.2–11.8)
POTASSIUM SERPL-SCNC: 4.5 MMOL/L (ref 3.5–5.5)
Q-T INTERVAL, ECG07: 378 MS
QRS DURATION, ECG06: 82 MS
QTC CALCULATION (BEZET), ECG08: 459 MS
RBC # BLD AUTO: 4.06 M/UL (ref 4.2–5.3)
SODIUM SERPL-SCNC: 138 MMOL/L (ref 136–145)
VENTRICULAR RATE, ECG03: 89 BPM
WBC # BLD AUTO: 10.6 K/UL (ref 4.6–13.2)

## 2018-03-14 PROCEDURE — 74011636637 HC RX REV CODE- 636/637: Performed by: FAMILY MEDICINE

## 2018-03-14 PROCEDURE — 36415 COLL VENOUS BLD VENIPUNCTURE: CPT | Performed by: STUDENT IN AN ORGANIZED HEALTH CARE EDUCATION/TRAINING PROGRAM

## 2018-03-14 PROCEDURE — 74011250636 HC RX REV CODE- 250/636: Performed by: STUDENT IN AN ORGANIZED HEALTH CARE EDUCATION/TRAINING PROGRAM

## 2018-03-14 PROCEDURE — 74011636637 HC RX REV CODE- 636/637: Performed by: STUDENT IN AN ORGANIZED HEALTH CARE EDUCATION/TRAINING PROGRAM

## 2018-03-14 PROCEDURE — 74011250636 HC RX REV CODE- 250/636: Performed by: FAMILY MEDICINE

## 2018-03-14 PROCEDURE — 85025 COMPLETE CBC W/AUTO DIFF WBC: CPT | Performed by: STUDENT IN AN ORGANIZED HEALTH CARE EDUCATION/TRAINING PROGRAM

## 2018-03-14 PROCEDURE — 80048 BASIC METABOLIC PNL TOTAL CA: CPT | Performed by: STUDENT IN AN ORGANIZED HEALTH CARE EDUCATION/TRAINING PROGRAM

## 2018-03-14 PROCEDURE — 65270000029 HC RM PRIVATE

## 2018-03-14 PROCEDURE — 94660 CPAP INITIATION&MGMT: CPT

## 2018-03-14 PROCEDURE — 82962 GLUCOSE BLOOD TEST: CPT

## 2018-03-14 PROCEDURE — 74011250637 HC RX REV CODE- 250/637: Performed by: STUDENT IN AN ORGANIZED HEALTH CARE EDUCATION/TRAINING PROGRAM

## 2018-03-14 PROCEDURE — 74011000250 HC RX REV CODE- 250: Performed by: STUDENT IN AN ORGANIZED HEALTH CARE EDUCATION/TRAINING PROGRAM

## 2018-03-14 PROCEDURE — 94640 AIRWAY INHALATION TREATMENT: CPT

## 2018-03-14 RX ORDER — INSULIN LISPRO 100 [IU]/ML
4 INJECTION, SOLUTION INTRAVENOUS; SUBCUTANEOUS
Status: DISCONTINUED | OUTPATIENT
Start: 2018-03-14 | End: 2018-03-15

## 2018-03-14 RX ORDER — INSULIN GLARGINE 100 [IU]/ML
28 INJECTION, SOLUTION SUBCUTANEOUS
Status: DISCONTINUED | OUTPATIENT
Start: 2018-03-14 | End: 2018-03-15

## 2018-03-14 RX ADMIN — FLUTICASONE PROPIONATE 2 SPRAY: 50 SPRAY, METERED NASAL at 11:51

## 2018-03-14 RX ADMIN — LISINOPRIL 40 MG: 20 TABLET ORAL at 08:13

## 2018-03-14 RX ADMIN — ASPIRIN 81 MG: 81 TABLET, COATED ORAL at 08:13

## 2018-03-14 RX ADMIN — MONTELUKAST SODIUM 10 MG: 10 TABLET, FILM COATED ORAL at 22:31

## 2018-03-14 RX ADMIN — PREDNISONE 60 MG: 20 TABLET ORAL at 08:13

## 2018-03-14 RX ADMIN — IPRATROPIUM BROMIDE AND ALBUTEROL SULFATE 3 ML: .5; 3 SOLUTION RESPIRATORY (INHALATION) at 23:15

## 2018-03-14 RX ADMIN — METHYLPREDNISOLONE SODIUM SUCCINATE 60 MG: 125 INJECTION, POWDER, FOR SOLUTION INTRAMUSCULAR; INTRAVENOUS at 05:41

## 2018-03-14 RX ADMIN — AZITHROMYCIN 250 MG: 250 TABLET, FILM COATED ORAL at 08:13

## 2018-03-14 RX ADMIN — ENOXAPARIN SODIUM 40 MG: 100 INJECTION SUBCUTANEOUS at 16:16

## 2018-03-14 RX ADMIN — INSULIN LISPRO 12 UNITS: 100 INJECTION, SOLUTION INTRAVENOUS; SUBCUTANEOUS at 16:07

## 2018-03-14 RX ADMIN — INSULIN LISPRO 12 UNITS: 100 INJECTION, SOLUTION INTRAVENOUS; SUBCUTANEOUS at 08:13

## 2018-03-14 RX ADMIN — IPRATROPIUM BROMIDE AND ALBUTEROL SULFATE 3 ML: .5; 3 SOLUTION RESPIRATORY (INHALATION) at 00:07

## 2018-03-14 RX ADMIN — PANTOPRAZOLE SODIUM 40 MG: 40 TABLET, DELAYED RELEASE ORAL at 08:13

## 2018-03-14 RX ADMIN — IPRATROPIUM BROMIDE AND ALBUTEROL SULFATE 3 ML: .5; 3 SOLUTION RESPIRATORY (INHALATION) at 16:50

## 2018-03-14 RX ADMIN — INSULIN LISPRO 12 UNITS: 100 INJECTION, SOLUTION INTRAVENOUS; SUBCUTANEOUS at 22:30

## 2018-03-14 RX ADMIN — IPRATROPIUM BROMIDE AND ALBUTEROL SULFATE 3 ML: .5; 3 SOLUTION RESPIRATORY (INHALATION) at 07:54

## 2018-03-14 RX ADMIN — INSULIN GLARGINE 28 UNITS: 100 INJECTION, SOLUTION SUBCUTANEOUS at 22:30

## 2018-03-14 RX ADMIN — INSULIN LISPRO 4 UNITS: 100 INJECTION, SOLUTION INTRAVENOUS; SUBCUTANEOUS at 17:25

## 2018-03-14 RX ADMIN — IPRATROPIUM BROMIDE AND ALBUTEROL SULFATE 3 ML: .5; 3 SOLUTION RESPIRATORY (INHALATION) at 04:28

## 2018-03-14 RX ADMIN — INSULIN LISPRO 15 UNITS: 100 INJECTION, SOLUTION INTRAVENOUS; SUBCUTANEOUS at 11:48

## 2018-03-14 RX ADMIN — IPRATROPIUM BROMIDE AND ALBUTEROL SULFATE 3 ML: .5; 3 SOLUTION RESPIRATORY (INHALATION) at 13:53

## 2018-03-14 NOTE — PROGRESS NOTES
*ATTENTION:  This note has been created by a medical student for educational purposes only. Please do not refer to the content of this note for clinical decision-making, billing, or other purposes. Please see attending physicians note to obtain clinical information on this patient. *     Progress Note  [facility] EVMS Service    Patient: Macie Peña MRN: [id number]   SSN: [id number]  YOB: 1960   Age: 61 y.o. Sex: female      Admit Date: 3/13/2018    LOS: 1 day   Chief Complaint   Patient presents with    Respiratory Distress       Subjective:     Ms. rEon Yap is feeling better since admission yesterday. She still reports shortness of breath on exertion whenever she gets up to use the bathroom. She reports chest tightness on inhalation but no pain. Last night for dinner she reports eating soup and roast beef on flatbread. She still has a dry cough but reports no other symptoms. She also reported some worry about her elevated blood sugars this morning of 312, as she said she normally runs in the 140s in the morning. Review of Systems:  Patient Endorses   ---------------------------------------------------------------------------------  Constitutional: Negative for fever, chills and diaphoresis. HENT: Negative for hearing loss and sore throat. Eyes: Negative for blurred vision and double vision. Respiratory: Negative for hemoptysis. Constant dry cough  Cardiovascular: Negative for chest pain and palpitations. Chest Tightness. Gastrointestinal: Negative for nausea and vomiting. Genitourinary: Negative for dysuria and hematuria. Musculoskeletal: Negative for myalgias and joint pain. Skin: Negative for itching and rash. Neurological: Negative for dizziness and headaches. Psychiatric: Negative for depression and suicidal ideas.      Objective:     Vitals:  Visit Vitals    /71 (BP 1 Location: Left arm, BP Patient Position: At rest)    Pulse 88    Temp 97.2 °F (36.2 °C)    Resp 20    Ht 5' 3\" (1.6 m)    Wt 114.7 kg (252 lb 12.8 oz)    SpO2 95%    Breastfeeding No    BMI 44.78 kg/m2       Physical Exam:   General appearance: alert, cooperative, no distress, appears stated age  Lungs: wheezes R base, L base  Heart: regular rate and rhythm, S1, S2 normal, no murmur, click, rub or gallop  Abdomen: soft, non-tender. Bowel sounds normal. No masses. Pulses: 2+ and symmetric  Skin: Skin color, texture, turgor normal. No rashes or lesions  Neuro:  mental status, speech normal, alert and oriented x iii  reflexes normal and symmetric    Intake and Output:  Current Shift: 03/14 0701 - 03/14 1900  In: 120 [P.O.:120]  Out: -   Last three shifts:      Lab/Data Review:  Recent Results (from the past 12 hour(s))   GLUCOSE, POC    Collection Time: 03/13/18  9:47 PM   Result Value Ref Range    Glucose (POC) 290 (H) 70 - 922 mg/dL   METABOLIC PANEL, BASIC    Collection Time: 03/14/18  4:00 AM   Result Value Ref Range    Sodium 138 136 - 145 mmol/L    Potassium 4.5 3.5 - 5.5 mmol/L    Chloride 103 100 - 108 mmol/L    CO2 26 21 - 32 mmol/L    Anion gap 9 3.0 - 18 mmol/L    Glucose 263 (H) 74 - 99 mg/dL    BUN 17 7.0 - 18 MG/DL    Creatinine 1.04 0.6 - 1.3 MG/DL    BUN/Creatinine ratio 16 12 - 20      GFR est AA >60 >60 ml/min/1.73m2    GFR est non-AA 54 (L) >60 ml/min/1.73m2    Calcium 9.2 8.5 - 10.1 MG/DL   CBC WITH AUTOMATED DIFF    Collection Time: 03/14/18  4:00 AM   Result Value Ref Range    WBC 10.6 4.6 - 13.2 K/uL    RBC 4.06 (L) 4.20 - 5.30 M/uL    HGB 12.2 12.0 - 16.0 g/dL    HCT 36.0 35.0 - 45.0 %    MCV 88.7 74.0 - 97.0 FL    MCH 30.0 24.0 - 34.0 PG    MCHC 33.9 31.0 - 37.0 g/dL    RDW 13.9 11.6 - 14.5 %    PLATELET 611 310 - 115 K/uL    MPV 9.2 9.2 - 11.8 FL    NEUTROPHILS 92 (H) 40 - 73 %    LYMPHOCYTES 7 (L) 21 - 52 %    MONOCYTES 1 (L) 3 - 10 %    EOSINOPHILS 0 0 - 5 %    BASOPHILS 0 0 - 2 %    ABS. NEUTROPHILS 9.7 (H) 1.8 - 8.0 K/UL    ABS.  LYMPHOCYTES 0.8 (L) 0.9 - 3.6 K/UL    ABS. MONOCYTES 0.1 0.05 - 1.2 K/UL    ABS. EOSINOPHILS 0.0 0.0 - 0.4 K/UL    ABS. BASOPHILS 0.0 0.0 - 0.1 K/UL    DF AUTOMATED     GLUCOSE, POC    Collection Time: 03/14/18  7:27 AM   Result Value Ref Range    Glucose (POC) 312 (H) 70 - 110 mg/dL         Assessment and Plan:     60 yo Female with PMH of multiple COPD exacerbations, Asthma, T2DM, HTN, SHERRIE, and GERD presenting with dyspnea on exertion and COPD exacerbation. 1) COPD Exacerbation and Asthma  - Continue Prednisone PO  - Continue NC Humidified with goal O2 of 88-92%  - Continue CPAP at night  - Duoneb Q4 hrs  - Continue home Atrovent, Advair, Singulair  - Followup with respiratory checks throughout the day    2) T2DM  - Continue Humalog at night, Lantus before meals    3) HTN  - Continue home Lisinopril    4) SHERRIE  - Continue CPAP at night    5) GERD  - Continue home Omeprazole with Pepcid for breakthrough exacerbation.     Cory Og, MS3   March 14, 2018

## 2018-03-14 NOTE — PROGRESS NOTES
Intern Progress Note  [facility]       Patient: Sophia Alarcon MRN: [id number]  CSN: [id number]    YOB: 1960  Age: 61 y.o. Sex: female    DOA: 3/13/2018 LOS:  LOS: 0 days                    Subjective:     Acute events: Patient reports SOB when going to the bathroom and exerting herself. Has chest tightness not chest pain, due to the SOB. Review of Systems   Respiratory: Positive for shortness of breath. Objective:      Patient Vitals for the past 24 hrs:   Temp Pulse Resp BP SpO2   03/13/18 2000 97.6 °F (36.4 °C) (!) 103 20 111/61 93 %   03/13/18 1731 97.9 °F (36.6 °C) 99 22 124/68 95 %   03/13/18 1600 - 97 24 146/56 96 %   03/13/18 1545 - 96 23 138/65 95 %   03/13/18 1530 - 98 20 128/65 96 %   03/13/18 1515 - (!) 106 29 141/71 93 %   03/13/18 1500 - 94 21 130/71 95 %   03/13/18 1445 - 91 28 160/82 95 %   03/13/18 1430 - 93 24 132/73 95 %   03/13/18 1415 - 93 25 111/64 95 %   03/13/18 1400 - 92 24 129/63 95 %   03/13/18 1345 - 95 23 136/69 95 %   03/13/18 1330 - 92 25 142/66 96 %   03/13/18 1315 - 93 23 136/88 96 %   03/13/18 1300 - 92 27 132/60 96 %   03/13/18 1245 - 92 24 123/60 95 %   03/13/18 1235 - - - - 96 %   03/13/18 1230 - 88 24 129/59 95 %   03/13/18 1215 - 89 24 134/61 95 %   03/13/18 1200 - 82 20 124/47 96 %   03/13/18 1145 - 85 22 110/47 94 %   03/13/18 1130 - 86 19 116/50 94 %   03/13/18 1115 - 88 19 139/52 95 %   03/13/18 1100 - 88 17 124/52 95 %   03/13/18 1045 - 88 23 126/60 95 %   03/13/18 1030 - 90 26 143/55 95 %   03/13/18 1015 - 91 20 133/55 97 %   03/13/18 1000 97.9 °F (36.6 °C) - - - -   03/13/18 1000 - 88 17 135/54 96 %   03/13/18 0956 - 93 16 - 91 %   03/13/18 0955 - - - 146/57 -       Physical Exam:  General:  Alert and Responsive and in No acute distress. CV:  RRR, no rubs gallops or murmurs. RESP:  Unlabored breathing. Expiratory wheezing bilaterally. Equal expansion bilaterally.       Assessment/Plan   Sophia Likes is a 61 y.o. female with a PMHx of COPD, Asthma, HTN, Type II DM, and GERD, is now admitted with an acute exacerbation of COPD.     Acute Exacerbation of COPD w/Chronic Hypoxic Respiratory Failure:     -Continue current management    Pearla Sandhoff, MD, PGY-1  [address]

## 2018-03-14 NOTE — DIABETES MGMT
Glycemic Control Plan of Care    BG above target range. POC BG range on 03/13/2018: 290-418 mg/dL. POC BG report on 03/14/2018 at time of review: 312, 392 mg/dL. Completed assessment of home diabetes management and education. See separate notes, 03/14/2018. Patient stated that she's eating meals with good appetite and noted that she has fruit juice for breakfast.    Current Meal Intake:  Patient Vitals for the past 100 hrs:   % Diet Eaten   03/14/18 0919 100 %     Recommendation(s):  1.) Consider increasing basal lantus insulin dose of 25 units daily at bedtime to 28 units while patient is on steroids. 2.) Add prandial lispro insulin 4 units TID AC while patient is on steroids. 3.) Continue POC BG monitoring.  4.) Consecrease basal lantus insulin dose back to 25 units and discontinue prandial insulin when steroids d/c'd.  5.) Modify diabetic diet to include no concentrated sweets. Assessment:  Patient is 61year old with past medical history including type 2 diabetes mellitus, esophageal reflux, SHERRIE on Cpap, COPD, obesity, hyperlipidemia, diastolic CHF and diverticulosis - was admitted on 03/13/2018 with report of shortness of breath. Noted:  Acute exacerbation COPD. Type 2 diabetes mellitus with current A1c of 7.3% (03/13/2018). Most recent blood glucose values:    Results for Monica Mendez (MRN [id number]) as of 3/14/2018 12:15   Ref. Range 3/13/2018 15:56 3/13/2018 17:52 3/13/2018 19:33 3/13/2018 21:47   GLUCOSE,FAST - POC Latest Ref Range: 70 - 110 mg/dL 311 (H) 418 (HH) 390 (H) 290 (H)     Results for Monica Mendez (MRN [id number]) as of 3/14/2018 12:15   Ref. Range 3/14/2018 07:27 3/14/2018 11:45   GLUCOSE,FAST - POC Latest Ref Range: 70 - 110 mg/dL 312 (H) 393 (H)     Current A1C: 7.3 (01/24/2018) is equivalent to average blood glucose of 163 mg/dL during the past 2-3 months.     Current hospital diabetes medications:  Basal lantus insulin 25 units daily at bedtime. Correctional lispro insulin ACHS. Very resistant dose. Total daily dose insulin requirement previous day: 03/13/2018:  Lantus: 25 units  Lispro: 32 units  TDD: 57 units of insulin     Home diabetes medications: As reported by patient on 03/14/2018:  Lantus insulin 25 units daily at bedtime. This will change to lantus (basaglar) same dose. Humalog sliding scale insulin 3x daily before meals (breakfast, lunch, dinner). Diet: Diabetic consistent carb; no concentrated sweets. Goals:  Blood glucose will be within target range of  mg/dL by 03/17/2018.     Education:  _X__  Refer to Diabetes Education Record: 03/14/2018.             ___  Education not indicated at this time    Gordo Jaime RN CCM

## 2018-03-14 NOTE — PROGRESS NOTES
Intern Progress Note  [facility]       Patient: Lamberto Rowell MRN: [id number]  CSN: [id number]    YOB: 1960  Age: 61 y.o. Sex: female    DOA: 3/13/2018 LOS:  LOS: 1 day                    Subjective:     Acute events:   CPAP overnight      Pt notes improved breahting today. Still notes pain in her back bilaterally that radiates forward with a squeezing sensation that is worse with inspiration. Denies Chest pain, arm tingling, or vision changes,    Review of Systems   Constitutional: Negative for chills and fever. Gastrointestinal: Negative for abdominal pain, nausea and vomiting.        Objective:      Patient Vitals for the past 24 hrs:   Temp Pulse Resp BP SpO2   03/14/18 0431 - - - - 99 %   03/14/18 0400 97 °F (36.1 °C) 86 22 107/69 92 %   03/14/18 0020 - - - - 98 %   03/14/18 0000 97.6 °F (36.4 °C) 100 24 116/68 95 %   03/13/18 2000 97.6 °F (36.4 °C) (!) 103 20 111/61 93 %   03/13/18 1731 97.9 °F (36.6 °C) 99 22 124/68 95 %   03/13/18 1600 - 97 24 146/56 96 %   03/13/18 1545 - 96 23 138/65 95 %   03/13/18 1530 - 98 20 128/65 96 %   03/13/18 1515 - (!) 106 29 141/71 93 %   03/13/18 1500 - 94 21 130/71 95 %   03/13/18 1445 - 91 28 160/82 95 %   03/13/18 1430 - 93 24 132/73 95 %   03/13/18 1415 - 93 25 111/64 95 %   03/13/18 1400 - 92 24 129/63 95 %   03/13/18 1345 - 95 23 136/69 95 %   03/13/18 1330 - 92 25 142/66 96 %   03/13/18 1315 - 93 23 136/88 96 %   03/13/18 1300 - 92 27 132/60 96 %   03/13/18 1245 - 92 24 123/60 95 %   03/13/18 1235 - - - - 96 %   03/13/18 1230 - 88 24 129/59 95 %   03/13/18 1215 - 89 24 134/61 95 %   03/13/18 1200 - 82 20 124/47 96 %   03/13/18 1145 - 85 22 110/47 94 %   03/13/18 1130 - 86 19 116/50 94 %   03/13/18 1115 - 88 19 139/52 95 %   03/13/18 1100 - 88 17 124/52 95 %   03/13/18 1045 - 88 23 126/60 95 %   03/13/18 1030 - 90 26 143/55 95 %   03/13/18 1015 - 91 20 133/55 97 %   03/13/18 1000 97.9 °F (36.6 °C) - - - -   03/13/18 1000 - 88 17 135/54 96 %   03/13/18 0956 - 93 16 - 91 %   03/13/18 0955 - - - 146/57 -       No intake or output data in the 24 hours ending 03/14/18 0721      Physical Exam:   General:  Alert and Responsive and in No acute distress. HEENT: No cervical, supraclavicular, occipital or submandibular lymphadenopathy. CV:  RRR, no rubs gallops or murmurs  RESP:  Unlabored breathing. Lungs note prominent wheezes diffusely, but improved air movement compared to prior exam.  Equal expansion bilaterally. MS:  No joint deformity or instability. No atrophy. Ext:  No edema. Skin:  No rashes, lesions, or ulcers.        Lab/Data Reviewed:  BMP:   Lab Results   Component Value Date/Time     03/14/2018 04:00 AM    K 4.5 03/14/2018 04:00 AM     03/14/2018 04:00 AM    CO2 26 03/14/2018 04:00 AM    AGAP 9 03/14/2018 04:00 AM     (H) 03/14/2018 04:00 AM    BUN 17 03/14/2018 04:00 AM    CREA 1.04 03/14/2018 04:00 AM    GFRAA >60 03/14/2018 04:00 AM    GFRNA 54 (L) 03/14/2018 04:00 AM     CMP:   Lab Results   Component Value Date/Time     03/14/2018 04:00 AM    K 4.5 03/14/2018 04:00 AM     03/14/2018 04:00 AM    CO2 26 03/14/2018 04:00 AM    AGAP 9 03/14/2018 04:00 AM     (H) 03/14/2018 04:00 AM    BUN 17 03/14/2018 04:00 AM    CREA 1.04 03/14/2018 04:00 AM    GFRAA >60 03/14/2018 04:00 AM    GFRNA 54 (L) 03/14/2018 04:00 AM    CA 9.2 03/14/2018 04:00 AM     CBC:   Lab Results   Component Value Date/Time    WBC 10.6 03/14/2018 04:00 AM    HGB 12.2 03/14/2018 04:00 AM    HCT 36.0 03/14/2018 04:00 AM     03/14/2018 04:00 AM     All Cardiac Markers in the last 24 hours: No results found for: CPK, CK, CKMMB, CKMB, RCK3, CKMBT, CKNDX, CKND1, KESHIA, TROPT, TROIQ, JOEL, TROPT, TNIPOC, BNP, BNPP  Recent Glucose Results:   Lab Results   Component Value Date/Time     (H) 03/14/2018 04:00 AM         Scheduled Medications Reviewed:  Current Facility-Administered Medications   Medication Dose Route Frequency    enoxaparin (LOVENOX) injection 40 mg  40 mg SubCUTAneous Q24H    insulin glargine (LANTUS) injection 25 Units  25 Units SubCUTAneous QHS    insulin lispro (HUMALOG) injection   SubCUTAneous AC&HS    predniSONE (DELTASONE) tablet 60 mg  60 mg Oral DAILY WITH BREAKFAST    azithromycin (ZITHROMAX) tablet 250 mg  250 mg Oral DAILY    aspirin delayed-release tablet 81 mg  81 mg Oral DAILY    lisinopril (PRINIVIL, ZESTRIL) tablet 40 mg  40 mg Oral DAILY    montelukast (SINGULAIR) tablet 10 mg  10 mg Oral QHS    pantoprazole (PROTONIX) tablet 40 mg  40 mg Oral ACB    fluticasone (FLONASE) 50 mcg/actuation nasal spray 2 Spray  2 Spray Both Nostrils DAILY    albuterol-ipratropium (DUO-NEB) 2.5 MG-0.5 MG/3 ML  3 mL Nebulization Q4H RT         Imaging, microbiology, and EKG/Telemetry:  Xr Chest Port    Result Date: 3/13/2018  Impression: 1. Scarring in the lung bases. No acute infiltrate appreciated. Assessment/Plan     61 y.o. female with PMH COPD, Asthma, and Diabetes, now admitted with COPD Exacerbation.     Moderate COPD Exacerbation with hx of asthma, Complicated due to multiple admissions in last year  Rapid Flu A/B: NEG/NEG  Pt notes progressive dry cough and fatigue over the last severl days . Endorses compliance and access to medications. S/P Reported magnesium,Duonebx2,Magnesium, and Solumedrol.  CXR showed bilateral opacities, but no infiltrate  -Continue NC Humidified with goal O2 of 88-92%  -Azithromycin 250mg PO for four days  -PO prednisone 60 today  -Duoneb Q3H RT  -Continue home atrovent, Advair, Singulair   -PM Respiratory Check by Night Team    SHERRIE:  -Titrate to home dose per RT or bring in CPAP from home        Diabetes-Controlled with 3/18 A1c of 7.7  -Increase Lantus 30 units QHS  -Continue SSI with high resistant regimen     HTN-Stable  -Continue Lisinopril 40     GERD  -Omeprazole daily  -Famotidine PRN     Diet: Diabetic  DVT Prophylaxis: Lovenox  Code Status: Full  Point of Contact:   Art Valdez [phone]     Disposition and anticipated LOS: >4 Midnights, Expect discharge home with home health     Mihaela Corbin MD PGY-1  [address]

## 2018-03-14 NOTE — PROGRESS NOTES
Pt is 61 y. o admitted for COPD. Pt is alert and oriented and alone in room. Pt reports her pcp is Dr. Shelly Luo with [facility]. Pt reports she lives with her daughter Dionne Lugo [phone] and her two children. Pt reports she is independent with ADLs and has walker, BSC and nebulizer. She reports she has oxygen and [employer] supplies her. Pt states her family will be transporting her home after discharge.

## 2018-03-14 NOTE — DIABETES MGMT
Diabetes Patient/Family Education Record    Factors That  May Influence Patients Ability  to Learn or  Comply with Recommendations   []   Language barrier    []   Cultural needs   []   Motivation    []   Cognitive limitation    []   Physical   [x]   Education    []   Physiological factors   []   Hearing/vision/speaking impairment   []   Zoroastrian beliefs    []   Financial factors   []  Other:   []  No factors identified at this time. Person Instructed:   [x]   Patient   []   Family   []  Other     Preference for Learning:   [x]   Verbal   [x]   Written   []  Demonstration     Level of Comprehension & Competence:    [x]  Good                                      [] Fair                                     []  Poor                             []  Needs Reinforcement   [x]  Teachback completed    Education Component:   [x]  Medication management, including how to administer insulin (if appropriate) and potential medication interactions: Patient reported taking the following diabetes meds at home as prescribed:  Lantus 25 units daily at bedtime. She will start with lantus (basaglar). Humalog sliding scale insulin 3x daily before meals (breakfast, lunch, dinner). [x]  Nutritional management: Patient stated, \"I try to do my best to stick to my diabetic diet. \" She verbalized understanding about the importance of eating 3 meals daily and serving size/portion control of carbs (starches, fruits, dairy) and limiting intake of concentrated sweets. [x]  Exercise: Patient is able to tolerate short distance walking exercise when not feeling sick. [x]  Signs, symptoms, and treatment of hyperglycemia and hypoglycemia   [x] Prevention, recognition and treatment of hyperglycemia and hypoglycemia   [x]  Importance of blood glucose monitoring and how to obtain a blood glucose meter: Patient stated that she has BG meter and testing supplies at home.  She is checking her blood sugar at least 3x daily because of sliding scale insulin before meals (breakfast, lunch, dinner). []  Instruction on use of the blood glucose meter   [x]  Discuss the importance of HbA1C monitoring: Discussed with patient her current A1c of 7.3% (01/24/2018) which is equivalent to average blood glucose of 163 mg/dL during the past 2-3 months. Patient verbalized knowledge and understanding of A1c.  Encouraged patient to continue to follow her diabetes treatment plan.    []  Sick day guidelines   [x]  Proper use and disposal of lancets, needles, syringes or insulin pens (if appropriate)   [x]  Potential long-term complications (retinopathy, kidney disease, neuropathy, foot care)   [x] Information about whom to contact in case of emergency or for more information    [x]  Goal:  Patient/family will demonstrate understanding of Diabetes Self Management Skills by: 03/21/2018  Plan for post-discharge education or self-management support:    [x] Outpatient class schedule provided            [] Patient Declined    [] Scheduled for outpatient classes (date) _______         Tigre Orourke RN CCM

## 2018-03-14 NOTE — PROGRESS NOTES
Ms. Romelia Last did not have any immediate concerns to share with me today, but she was glad that I stopped by. I invited her to call us at any time if she needs support. She seemed to be in good spirits about her recovery as well.      [address], M.Div, CPE Resident   Pager: [phone]  Phone: [phone]

## 2018-03-14 NOTE — CDMP QUERY
Patient is noted to have a BMI of    44.8. Please clarify if this patient is:     =>Morbidly obese (BMI ³ 40)  =>Obese (BMI 30 - 39.9)  =>Overweight (BMI 25 - 29.9)  =>Other explanation of clinical findings  =>Unable to determine (no explanation for clinical findings)    Presentation: 5'3\",   252  lbs = BMI   47.8     REFERENCE:  The [address] [place] [address] has issued a statement indicating that, \"Individuals who are overweight, obese, or morbidly obese are at an increased risk for certain medical conditions when compared to persons of normal weight. Therefore, these conditions are always clinically significant and reportable when documented by the provider.     Thank you,   Royce Mathur RN   CCDS  x [phone]

## 2018-03-15 ENCOUNTER — HOME HEALTH ADMISSION (OUTPATIENT)
Dept: HOME HEALTH SERVICES | Facility: HOME HEALTH | Age: 58
End: 2018-03-15

## 2018-03-15 VITALS
BODY MASS INDEX: 44.79 KG/M2 | HEIGHT: 63 IN | OXYGEN SATURATION: 96 % | WEIGHT: 252.8 LBS | RESPIRATION RATE: 18 BRPM | HEART RATE: 75 BPM | TEMPERATURE: 97 F | SYSTOLIC BLOOD PRESSURE: 109 MMHG | DIASTOLIC BLOOD PRESSURE: 84 MMHG

## 2018-03-15 LAB
ANION GAP SERPL CALC-SCNC: 8 MMOL/L (ref 3–18)
BASOPHILS # BLD: 0 K/UL (ref 0–0.1)
BASOPHILS NFR BLD: 0 % (ref 0–2)
BUN SERPL-MCNC: 28 MG/DL (ref 7–18)
BUN/CREAT SERPL: 27 (ref 12–20)
CALCIUM SERPL-MCNC: 9 MG/DL (ref 8.5–10.1)
CHLORIDE SERPL-SCNC: 103 MMOL/L (ref 100–108)
CO2 SERPL-SCNC: 27 MMOL/L (ref 21–32)
CREAT SERPL-MCNC: 1.04 MG/DL (ref 0.6–1.3)
DIFFERENTIAL METHOD BLD: ABNORMAL
EOSINOPHIL # BLD: 0 K/UL (ref 0–0.4)
EOSINOPHIL NFR BLD: 0 % (ref 0–5)
ERYTHROCYTE [DISTWIDTH] IN BLOOD BY AUTOMATED COUNT: 14.3 % (ref 11.6–14.5)
GLUCOSE BLD STRIP.AUTO-MCNC: 210 MG/DL (ref 70–110)
GLUCOSE BLD STRIP.AUTO-MCNC: 253 MG/DL (ref 70–110)
GLUCOSE BLD STRIP.AUTO-MCNC: 320 MG/DL (ref 70–110)
GLUCOSE SERPL-MCNC: 183 MG/DL (ref 74–99)
HCT VFR BLD AUTO: 33.7 % (ref 35–45)
HGB BLD-MCNC: 11.6 G/DL (ref 12–16)
LYMPHOCYTES # BLD: 2 K/UL (ref 0.9–3.6)
LYMPHOCYTES NFR BLD: 14 % (ref 21–52)
MCH RBC QN AUTO: 31.2 PG (ref 24–34)
MCHC RBC AUTO-ENTMCNC: 34.4 G/DL (ref 31–37)
MCV RBC AUTO: 90.6 FL (ref 74–97)
MONOCYTES # BLD: 0.8 K/UL (ref 0.05–1.2)
MONOCYTES NFR BLD: 6 % (ref 3–10)
NEUTS SEG # BLD: 11.2 K/UL (ref 1.8–8)
NEUTS SEG NFR BLD: 80 % (ref 40–73)
PLATELET # BLD AUTO: 269 K/UL (ref 135–420)
PMV BLD AUTO: 8.9 FL (ref 9.2–11.8)
POTASSIUM SERPL-SCNC: 4 MMOL/L (ref 3.5–5.5)
RBC # BLD AUTO: 3.72 M/UL (ref 4.2–5.3)
SODIUM SERPL-SCNC: 138 MMOL/L (ref 136–145)
WBC # BLD AUTO: 14.1 K/UL (ref 4.6–13.2)

## 2018-03-15 PROCEDURE — 94640 AIRWAY INHALATION TREATMENT: CPT

## 2018-03-15 PROCEDURE — 82962 GLUCOSE BLOOD TEST: CPT

## 2018-03-15 PROCEDURE — 85025 COMPLETE CBC W/AUTO DIFF WBC: CPT | Performed by: STUDENT IN AN ORGANIZED HEALTH CARE EDUCATION/TRAINING PROGRAM

## 2018-03-15 PROCEDURE — 74011636637 HC RX REV CODE- 636/637: Performed by: STUDENT IN AN ORGANIZED HEALTH CARE EDUCATION/TRAINING PROGRAM

## 2018-03-15 PROCEDURE — 74011636637 HC RX REV CODE- 636/637: Performed by: FAMILY MEDICINE

## 2018-03-15 PROCEDURE — 74011000250 HC RX REV CODE- 250: Performed by: STUDENT IN AN ORGANIZED HEALTH CARE EDUCATION/TRAINING PROGRAM

## 2018-03-15 PROCEDURE — 77010033678 HC OXYGEN DAILY: Performed by: FAMILY MEDICINE

## 2018-03-15 PROCEDURE — 80048 BASIC METABOLIC PNL TOTAL CA: CPT | Performed by: STUDENT IN AN ORGANIZED HEALTH CARE EDUCATION/TRAINING PROGRAM

## 2018-03-15 PROCEDURE — 74011250637 HC RX REV CODE- 250/637: Performed by: STUDENT IN AN ORGANIZED HEALTH CARE EDUCATION/TRAINING PROGRAM

## 2018-03-15 PROCEDURE — 36415 COLL VENOUS BLD VENIPUNCTURE: CPT | Performed by: STUDENT IN AN ORGANIZED HEALTH CARE EDUCATION/TRAINING PROGRAM

## 2018-03-15 RX ORDER — INSULIN GLARGINE 100 [IU]/ML
25 INJECTION, SOLUTION SUBCUTANEOUS
Status: DISCONTINUED | OUTPATIENT
Start: 2018-03-15 | End: 2018-03-15 | Stop reason: HOSPADM

## 2018-03-15 RX ORDER — ARFORMOTEROL TARTRATE 15 UG/2ML
15 SOLUTION RESPIRATORY (INHALATION)
Status: DISCONTINUED | OUTPATIENT
Start: 2018-03-15 | End: 2018-03-15 | Stop reason: HOSPADM

## 2018-03-15 RX ORDER — BUDESONIDE 0.5 MG/2ML
500 INHALANT ORAL
Status: DISCONTINUED | OUTPATIENT
Start: 2018-03-15 | End: 2018-03-15 | Stop reason: HOSPADM

## 2018-03-15 RX ORDER — IPRATROPIUM BROMIDE 0.5 MG/2.5ML
0.5 SOLUTION RESPIRATORY (INHALATION)
Qty: 30 VIAL | Refills: 0 | Status: SHIPPED | OUTPATIENT
Start: 2018-03-15 | End: 2019-01-18

## 2018-03-15 RX ORDER — PREDNISONE 10 MG/1
TABLET ORAL
Qty: 20 TAB | Refills: 0 | Status: SHIPPED | OUTPATIENT
Start: 2018-03-15 | End: 2018-03-15

## 2018-03-15 RX ORDER — AZITHROMYCIN 250 MG/1
TABLET, FILM COATED ORAL
Qty: 2 TAB | Refills: 0 | Status: SHIPPED | OUTPATIENT
Start: 2018-03-16 | End: 2018-03-20 | Stop reason: ALTCHOICE

## 2018-03-15 RX ORDER — ALBUTEROL SULFATE 0.83 MG/ML
2.5 SOLUTION RESPIRATORY (INHALATION)
Qty: 30 EACH | Refills: 0 | Status: SHIPPED | OUTPATIENT
Start: 2018-03-15 | End: 2019-06-06 | Stop reason: SDUPTHER

## 2018-03-15 RX ORDER — INSULIN GLARGINE 100 [IU]/ML
28 INJECTION, SOLUTION SUBCUTANEOUS
Qty: 28 UNITS | Refills: 0 | Status: ON HOLD | OUTPATIENT
Start: 2018-03-15 | End: 2018-07-10

## 2018-03-15 RX ORDER — INSULIN GLARGINE 100 [IU]/ML
28 INJECTION, SOLUTION SUBCUTANEOUS
Qty: 28 UNITS | Refills: 0 | Status: SHIPPED | OUTPATIENT
Start: 2018-03-15 | End: 2018-03-15

## 2018-03-15 RX ORDER — INSULIN LISPRO 100 [IU]/ML
4 INJECTION, SOLUTION SUBCUTANEOUS
Qty: 6 ML | Refills: 0 | Status: SHIPPED | OUTPATIENT
Start: 2018-03-15 | End: 2018-06-08 | Stop reason: ALTCHOICE

## 2018-03-15 RX ORDER — AZITHROMYCIN 250 MG/1
TABLET, FILM COATED ORAL
Qty: 2 TAB | Refills: 0 | Status: SHIPPED | OUTPATIENT
Start: 2018-03-16 | End: 2018-03-15

## 2018-03-15 RX ORDER — PREDNISONE 10 MG/1
TABLET ORAL
Qty: 20 TAB | Refills: 0 | Status: SHIPPED | OUTPATIENT
Start: 2018-03-15 | End: 2018-04-09

## 2018-03-15 RX ADMIN — IPRATROPIUM BROMIDE AND ALBUTEROL SULFATE 3 ML: .5; 3 SOLUTION RESPIRATORY (INHALATION) at 16:58

## 2018-03-15 RX ADMIN — IPRATROPIUM BROMIDE AND ALBUTEROL SULFATE 3 ML: .5; 3 SOLUTION RESPIRATORY (INHALATION) at 08:29

## 2018-03-15 RX ADMIN — BUDESONIDE AND FORMOTEROL FUMARATE DIHYDRATE 2 PUFF: 160; 4.5 AEROSOL RESPIRATORY (INHALATION) at 08:29

## 2018-03-15 RX ADMIN — INSULIN LISPRO 6 UNITS: 100 INJECTION, SOLUTION INTRAVENOUS; SUBCUTANEOUS at 07:30

## 2018-03-15 RX ADMIN — ASPIRIN 81 MG: 81 TABLET, COATED ORAL at 09:31

## 2018-03-15 RX ADMIN — INSULIN LISPRO 9 UNITS: 100 INJECTION, SOLUTION INTRAVENOUS; SUBCUTANEOUS at 13:00

## 2018-03-15 RX ADMIN — INSULIN LISPRO 4 UNITS: 100 INJECTION, SOLUTION INTRAVENOUS; SUBCUTANEOUS at 07:30

## 2018-03-15 RX ADMIN — PANTOPRAZOLE SODIUM 40 MG: 40 TABLET, DELAYED RELEASE ORAL at 09:30

## 2018-03-15 RX ADMIN — IPRATROPIUM BROMIDE AND ALBUTEROL SULFATE 3 ML: .5; 3 SOLUTION RESPIRATORY (INHALATION) at 04:05

## 2018-03-15 RX ADMIN — AZITHROMYCIN 250 MG: 250 TABLET, FILM COATED ORAL at 09:30

## 2018-03-15 RX ADMIN — PREDNISONE 60 MG: 20 TABLET ORAL at 09:30

## 2018-03-15 NOTE — DIABETES MGMT
Glycemic Control Plan of Care    BG above target range. POC BG range on 03/14/2018: 301-393 mg/dL. Increased basal dose, added prandial AC TID, and cont on sliding scale insulin. Also modified diabetic diet by including no concentrated sweets. POC BG report on 03/15/2018 at time of review: 210 mg/dL (taken after eating breakfast). Noted steroids d/c'd. Patient stated that she continue to meals with good appetite. Patient reported eating 100% of her breakfast this morning. Current Meal Intake:  Patient Vitals for the past 100 hrs:   % Diet Eaten   03/14/18 1833 95 %   03/14/18 1410 100 %   03/14/18 0919 100 %     Recommendation(s):  1.) Change basal lantus insulin back to 25 units QHS since patient is no longer on steroids, and discontinue prandial insulin TID AC. [facility] physician and obtained order. 2.) Completed assessment of home diabetes management and education. See separate notes, 03/14/2018. Assessment:  Patient is 61year old with past medical history including type 2 diabetes mellitus, esophageal reflux, SHERRIE on Cpap, COPD, obesity, hyperlipidemia, diastolic CHF and diverticulosis - was admitted on 03/13/2018 with report of shortness of breath. Noted:  Acute exacerbation COPD. Type 2 diabetes mellitus with current A1c of 7.3% (03/13/2018). Most recent blood glucose values:    Results for Emily Marking (MRN [id number]) as of 3/15/2018 10:34   Ref. Range 3/14/2018 07:27 3/14/2018 11:45 3/14/2018 15:36 3/14/2018 22:07   GLUCOSE,FAST - POC Latest Ref Range: 70 - 110 mg/dL 312 (H) 393 (H) 336 (H) 301 (H)     Results for Emily Marking (MRN [id number]) as of 3/15/2018 10:34   Ref. Range 3/15/2018 09:42   GLUCOSE,FAST - POC Latest Ref Range: 70 - 110 mg/dL 210 (H)     Current A1C: 7.3 (01/24/2018) is equivalent to average blood glucose of 163 mg/dL during the past 2-3 months.     Current hospital diabetes medications:  Basal lantus insulin 25 units daily at bedtime. Correctional lispro insulin ACHS. Very resistant dose. Total daily dose insulin requirement previous day: 03/14/2018:  Lantus: 28 units  Lispro: 55 units  TDD: 83 units of insulin     Home diabetes medications: As reported by patient on 03/14/2018:  Lantus insulin 25 units daily at bedtime. This will change to lantus (basaglar) same dose. Humalog sliding scale insulin 3x daily before meals (breakfast, lunch, dinner). Diet: Diabetic consistent carb; no concentrated sweets. Goals:  Blood glucose will be within target range of  mg/dL by 03/18/2018.     Education:  _X__  Refer to Diabetes Education Record: 03/14/2018.             ___  Education not indicated at this time    Erick David RN CCM

## 2018-03-15 NOTE — DISCHARGE INSTRUCTIONS
Use your breathing exercises and slow deep breaths with your spacer. Continue the 4 units of insulin before meals while you are on the steroids unless your sugars are less than 300. Call [address] at [phone] if you have difficulty getting your medications or your breathing is worse. If you have a temperature greater than 100.4*F, Call your doctor. If you have Chest pain  lasting more than 15 minutes, seek care at the nearest Emergency Department. If you cannot tolerate drinking fluids, seek care at the nearest Emergency Department. DISCHARGE SUMMARY from Nurse    PATIENT INSTRUCTIONS:    After general anesthesia or intravenous sedation, for 24 hours or while taking prescription Narcotics:  · Limit your activities  · Do not drive and operate hazardous machinery  · Do not make important personal or business decisions  · Do  not drink alcoholic beverages  · If you have not urinated within 8 hours after discharge, please contact your surgeon on call. Report the following to your surgeon:  · Excessive pain, swelling, redness or odor of or around the surgical area  · Temperature over 100.5  · Nausea and vomiting lasting longer than 4 hours or if unable to take medications  · Any signs of decreased circulation or nerve impairment to extremity: change in color, persistent  numbness, tingling, coldness or increase pain  · Any questions    What to do at Home:  Recommended activity: {discharge activity:16602}, ***    If you experience any of the following symptoms ***, please follow up with ***. *  Please give a list of your current medications to your Primary Care Provider. *  Please update this list whenever your medications are discontinued, doses are      changed, or new medications (including over-the-counter products) are added. *  Please carry medication information at all times in case of emergency situations.     These are general instructions for a healthy lifestyle:    No smoking/ No tobacco products/ Avoid exposure to second hand smoke  Surgeon General's Warning:  Quitting smoking now greatly reduces serious risk to your health. Obesity, smoking, and sedentary lifestyle greatly increases your risk for illness    A healthy diet, regular physical exercise & weight monitoring are important for maintaining a healthy lifestyle    You may be retaining fluid if you have a history of heart failure or if you experience any of the following symptoms:  Weight gain of 3 pounds or more overnight or 5 pounds in a week, increased swelling in our hands or feet or shortness of breath while lying flat in bed. Please call your doctor as soon as you notice any of these symptoms; do not wait until your next office visit. Recognize signs and symptoms of STROKE:    F-face looks uneven    A-arms unable to move or move unevenly    S-speech slurred or non-existent    T-time-call 911 as soon as signs and symptoms begin-DO NOT go       Back to bed or wait to see if you get better-TIME IS BRAIN. Warning Signs of HEART ATTACK     Call 911 if you have these symptoms:   Chest discomfort. Most heart attacks involve discomfort in the center of the chest that lasts more than a few minutes, or that goes away and comes back. It can feel like uncomfortable pressure, squeezing, fullness, or pain.  Discomfort in other areas of the upper body. Symptoms can include pain or discomfort in one or both arms, the back, neck, jaw, or stomach.  Shortness of breath with or without chest discomfort.  Other signs may include breaking out in a cold sweat, nausea, or lightheadedness. Don't wait more than five minutes to call 911 - MINUTES MATTER! Fast action can save your life. Calling 911 is almost always the fastest way to get lifesaving treatment. Emergency Medical Services staff can begin treatment when they arrive -- up to an hour sooner than if someone gets to the hospital by car.      The discharge information has been reviewed with the {PATIENT PARENT GUARDIAN:68788}. The {PATIENT PARENT GUARDIAN:53028} verbalized understanding. Discharge medications reviewed with the {Dishcarge meds reviewed OIXF:91214} and appropriate educational materials and side effects teaching were provided.   ___________________________________________________________________________________________________________________________________

## 2018-03-15 NOTE — DISCHARGE SUMMARY
Discharge Summary  [address] [facility]      Patient: Kennedi Robin Age: 61 y.o. Sex: female  : 1960    MRN: [id number]      DOA: 3/13/2018      Discharge Date: 3/15/18      Attending:No att. providers found      PCP: Kalie Zuniga MD        ================================================================    Reason for Admission: COPD (chronic obstructive pulmonary disease) [facility])    Discharge Diagnoses: Moderate COPD Exacerbation with second admission in three months  Obstructive Sleep Apnea  Diabetes Type II- Controlled with A1c of 7.7  Hypertension-Stable  GERD-Stable      Important notes to PCP/ follow-up studies and evaluations:  COPD: Confirm insurance coverage for Incruse Ellipta  DM2: Accucheck. Determine if parandial dosing with steroids still needed  SHERRIE: Pt needs to follow up with Pulmonology to obtain requested mask    Pending labs and studies:  None    Operative Procedures:   none    Discharge Medications:     Discharge Medication List as of 3/15/2018  5:48 PM      START taking these medications    Details   insulin lispro 100 unit/mL inph 4 Units by SubCUTAneous route Before breakfast, lunch, and dinner. Indications: type 2 diabetes mellitus, while taking steroids, Normal, Disp-6 mL, R-0         CONTINUE these medications which have CHANGED    Details   insulin glargine (LANTUS,BASAGLAR) 100 unit/mL (3 mL) inpn 28 Units by SubCUTAneous route nightly. Indications: type 2 diabetes mellitus, Normal, Disp-28 Units, R-0      umeclidinium (INCRUSE ELLIPTA) 62.5 mcg/actuation inhaler Take 1 Puff by inhalation daily. Indications: BRONCHOSPASM PREVENTION WITH COPD, Normal, Disp-1 Inhaler, R-0      predniSONE (DELTASONE) 10 mg tablet Start day after discharge:  Day 1: Take 6 pills PO  Day 2-3:Take 4 pills PO  Day 4-5:Take 3 pills PO  Days 6-7:Take 2 pills PO, Print, Disp-20 Tab, R-0      !! Diabetic Supplies, [address]  [facility] Diabetic needles 1/2 inch 31 gauge - 1 injection daily, Normal, Disp-30 Each, R-5      !! Diabetic Supplies, [address] [facility] [place] Diabetic test strips - use four times daily, Print, Disp-120 Each, R-0      !! Diabetic Supplies, [address] [facility] [place] Diabetic lancets - use three times daily, Normal, Disp-90 Each, R-5      ipratropium (ATROVENT) 0.02 % soln 2.5 mL by Nebulization route four (4) times daily as needed. Indications: BRONCHOSPASM PREVENTION WITH COPD, Print, Disp-30 Vial, R-0      albuterol (PROVENTIL VENTOLIN) 2.5 mg /3 mL (0.083 %) nebulizer solution 3 mL by Nebulization route every four (4) hours as needed for Wheezing. Indications: BRONCHOSPASM PREVENTION, Chronic Obstructive Pulmonary Disease, Print, Disp-30 Each, R-0      azithromycin (ZITHROMAX) 250 mg tablet Take 1 tablet daily for COPD exacerbation, Normal, Disp-2 Tab, R-0       !! - Potential duplicate medications found. Please discuss with provider. CONTINUE these medications which have NOT CHANGED    Details   omeprazole (PRILOSEC) 40 mg capsule Take 40 mg by mouth daily. Indications: gastroesophageal reflux disease, Historical Med      lisinopril (PRINIVIL, ZESTRIL) 40 mg tablet Take 40 mg by mouth daily. Indications: hypertension, Historical Med      simethicone (MYLICON) 80 mg chewable tablet Take 80 mg by mouth every six (6) hours as needed for Flatulence., Historical Med      cpap machine kit by Does Not Apply route nightly., Historical Med      OXYGEN-AIR DELIVERY SYSTEMS 2 L by Nasal route continuous. , Historical Med      fluticasone-salmeterol (ADVAIR HFA) 230-21 mcg/actuation inhaler Take 2 Puffs by inhalation two (2) times a day., Historical Med      !! Diabetic Supplies, [address] [facility] [place] Diabetic syringes 1 mL - use one daily, Print, Disp-30 Each, R-5      insulin lispro (HUMALOG) 100 unit/mL injection Check sugars three times a day before meals, if blood sugar is greater than 250 give yourself 8 units of Humalog before eating.   Indications: type 2 diabetes mellitus, Print, Disp-1 Vial, R-2      aspirin delayed-release 81 mg tablet Take 81 mg by mouth daily. , Historical Med      famotidine (PEPCID) 20 mg tablet Take 20 mg by mouth two (2) times daily as needed., Historical Med      albuterol (PROVENTIL HFA, VENTOLIN HFA, PROAIR HFA) 90 mcg/actuation inhaler Take 2 Puffs by inhalation every four (4) hours as needed for Wheezing. For the next 2 days after hospital discharge, use your inhaler 4 times a day., Print, Disp-1 Inhaler, R-0      Inhalational Spacing Device (AEROCHAMBER) 1 Each by Does Not Apply route as needed for Cough or Other (COPD exacerbation). , Normal, Disp-1 Device, R-0      fluticasone (FLONASE) 50 mcg/actuation nasal spray 2 Sprays by Both Nostrils route daily. , Historical Med      montelukast (SINGULAIR) 10 mg tablet Take 10 mg by mouth nightly., Historical Med       !! - Potential duplicate medications found. Please discuss with provider. STOP taking these medications       ipratropium (ATROVENT HFA) 17 mcg/actuation inhaler Comments:   Reason for Stopping: Discontinued due to new RX for Incruse            Disposition: Home with [place] [id number] to Home    Consultants:    Pulmonary(Dr. Jahaira Brown)    [facility] Course (including pertinent history and physical findings)  Ms. Polly julien endorsed one week dry cough that worsened progressively over the last 3-4 days prior to admission. She was not able to ambulate more than 10 feet at home, so she began her PRN albuterol and ipratropium nebulizers. She endorses compliance with her medicaitons. She denied any sick or flu contacts. Despite receiving duoneb, albuterol, solumedrol and magnesium by EMS and the Emergency Department, She continued to have bilateral chest discomfort with inspiration and only able to speak one or two words before becoming short of breath. She was admitted for moderate COPD exacerbation. On discharge, her vitals were stable. She was tolerating PO. Her blood glucose had improved. Her breathing had returned to baseline. Moderate COPD Exacerbation with second admission in three months, HX of Asthma  She received nebulized duoneb treatments in addition to her home medications. Her breathing improved to her baseline at day of discharge, She still required her home oxygen nasal cannula titration at 3L. After inpatient pulmonary consultation and transition to PO steroids, She was discharged with a prednisone taper and switched to [facility] [address] for her daily anti-cholinergic. Her remaining medications for her COPD/Asthma did not change, She was recommended to follow up with Pulmonology shortly    Obstructive Sleep Apnea  She received CPAP during admission. Pt had difficulty tolerating the nasal CPAP at home, so she desires a full face mask in order to benefit from her CPAP machine. She is recommended to follow up with her pulmonologist's office since her sleep medicine physician has retired. Her Primary care doctor's office was tasked to obtain records from sleep medicine in order to send to her pulmonologist.    Diabetes Type II- Controlled with A1c of 7.7  Her Blood glucose became elevated due to steroids, so she was placed on parandial dosing while she is on steroids. She is instructed to continue her sliding scale and long acting home regimen as prescribed. Hypertension-Stable  Her blood pressure was well controlled, so her home regimen was continued at discharge. GERD-Stable  Her home medications were resumed on discharge.       Summarized key findings and results (labs, imaging studies, ECHO, cardiac cath, endoscopies, etc):    CBC w/Diff    Lab Results   Component Value Date/Time    WBC 14.1 (H) 03/15/2018 03:44 AM    HGB 11.6 (L) 03/15/2018 03:44 AM    HCT 33.7 (L) 03/15/2018 03:44 AM    PLATELET 990 67/60/4714 03:44 AM    MCV 90.6 03/15/2018 03:44 AM           Chemistry    Lab Results   Component Value Date/Time    Sodium 138 03/15/2018 03:44 AM    Potassium 4.0 03/15/2018 03:44 AM    Chloride 103 03/15/2018 03:44 AM    CO2 27 03/15/2018 03:44 AM    Anion gap 8 03/15/2018 03:44 AM    Glucose 183 (H) 03/15/2018 03:44 AM    BUN 28 (H) 03/15/2018 03:44 AM    Creatinine 1.04 03/15/2018 03:44 AM    BUN/Creatinine ratio 27 (H) 03/15/2018 03:44 AM    GFR est AA >60 03/15/2018 03:44 AM    GFR est non-AA 54 (L) 03/15/2018 03:44 AM    Calcium 9.0 03/15/2018 03:44 AM         All Micro Results     Procedure Component Value Units Date/Time    INFLUENZA A & B AG (RAPID TEST) [[id number]] Collected:  03/13/18 1925    Order Status:  Completed Specimen:  Nasopharyngeal from Nasal washing Updated:  03/13/18 2032     Influenza A Antigen NEGATIVE          A negative result does not exclude influenza virus infection, seasonal or H1N1 due to suboptimal sensitivity. If influenza is circulating in your community, a diagnosis of influenza should be considered based on a patients clinical presentation and empiric antiviral treatment should be considered, if indicated. Influenza B Antigen NEGATIVE        INFLUENZA A & B AG (RAPID TEST) [[id number]] Collected:  03/13/18 2015    Order Status:  Canceled Specimen:  Nasopharyngeal from Nasopharyngeal         Functional status and cognitive function:    Ambulates without assistance  Status: alert, cooperative, no distress, appears stated age    Diet:Diabetic    Code status and advanced care plan: Full  Point of Contact:  [facility] [phone]([id number])      Patient Education:  Patient was educated on the following topics prior to discharge:COPD Advance care planning, COPD Breathing techniques, and COPD exacerbation. The importance of taking their medications as prescribed. The importance of following up with their PCP.  The importance of following up with their Pulmonologist.     Follow-up:   Follow-up Information     Follow up With Details Comments [place], MD On 3/23/2018 @  9:40; New Location at [facility] 2nd Floor [address] [facility] [address] [place] [address] REXSacha Lima MD On 4/9/2018 @ 12:00 [address] [place] [id number]  [phone]            ================================================================      Robin Diego MD PGY-1  [address] [facility]

## 2018-03-15 NOTE — ROUTINE PROCESS
1920-Bedside and Verbal shift change report given to Doctor Stephanie Zimmerman (oncoming nurse) by Lazaro Trevino RN (offgoing nurse). Report included the following information SBAR, Kardex, ED Summary, [facility] and Recent Results.

## 2018-03-15 NOTE — PROGRESS NOTES
Referral for [facility] sent to [facility]. Called [facility] and left a message. Pt has been discharged and pt states her daughter is coming to pick her up to go home but she [facility] [employer] requested [employer] so she can get home care aid to assist her at home when her daughter is at work.

## 2018-03-15 NOTE — PROGRESS NOTES
Problem: Diabetes Self-Management  Goal: *Using medications safely  State effect of diabetes medications on diabetes; name diabetes medication taking, action and side effects. Patient will verbalized diabetic meds taking.

## 2018-03-15 NOTE — CONSULTS
[facility]  Pulmonary, Critical Care, and Sleep Medicine    Initial Patient Consult    Name: Shelbie Ramos MRN: [id number]   : 1960 Hospital: [facility]   Date: 3/15/2018        IMPRESSION:   · COPD exacerbation with acute exacerbation secondary to obstructive bronchitis- improved wheezing, mild hypoxia and improved work of breathing - no clinical or radiological evidence of pneumonia. Patient with moderate to severe COPD on home O2, 2 Ltrs at baseline (GOLD Class B), MMRC class 2, last exacerbation 2 months back. · COPD/asthma overlap syndrome. Significant bronchodilator response noted on PFT  ·  Morbid obesity, SHERRIE with night time desaturations - on CPAP at home with O2 bleed, cont at home settings. ·  Hx of hypertension, chronic diastolic CHF- well compensated  · Hx of GERD  · Hx of diverticulosis  · Former nicotine dependence       RECOMMENDATIONS:   · Oxygen- titrate to SaO2 >88%  · CPAP at night. Will instruct to bring in home machine  · Bronchial hygiene protocol- needs intensified regimen  · Bronchodilators- would continue long acting agents- needs combination of LABA+LAMA per guidelines. Also Inhaled steroids. Recommend Brovonna+ Pulmicort in hospital and switch to either Stiolto+ Pulmicort inhalers or Advair + Incruse or consider Trelegy as single agent as outpatient. Insurance coverage will dictate choice  · Steroids- short taper if symptoms not further improved  · Antibiotics- agree with macrolide- Zithromax  · Aspiration precautions  · Out patient testing- PFT, 6 min walk, Polysomnogram  · Will have patient follow up with Dr. Rylie Munoz for Sleep, Dr. Khai Zapata for Pulmonology  · Assess home Oxygen needs ( flow rate) at discharge  · OT, PT, OOB and ambulate  · Healthy weight  · Will Follow  · DVT, PUD prophylaxis     Subjective: This patient has been seen and evaluated at the request of Dr. April Cruz for COPD and SHERRIE management.    Patient is a 61 y.o. female with PMH Severe COPD (follows with Dr. Valente, is on Spiriva 2 puffs once daily and Advair 2 puffs twice daily and as needed albuterol), SHERRIE on CPAP, IDDM2, HTN, HLD, GERD, stress incontinence, SHERRIE on CPAP, now presenting with complaint of progressive and worsening SOB. not relieved by home nebulized albuterol and ipratropium. She states that since January her insurance does not cover Spiriva so she was prescribed atrovent. She has CPAP at home with nasal pillows and is requesting follow up with Pulmonology since her previous sleep MD is retiring.     Pt endorses one week dry cough that has worsened progressively over the last 3-4 days. She was not able to ambulate more than 10 feet, so she began her PRN albuterol and ipratropium nebulizer. She endorses compliance with her medicaitons and was recently started on her hospital formulary medications, but she us using her remaining COPD meds at home. She denies any sick or flu contacts. She notes bilateral chest discomfort with inspiration, but notes some imporvement afetr her first duoneb in the emergency department. Currently she is attempting to cough- cannot expectorate any mucus. Denies fever/chills  Denies sick contacts  Has had recent admission to [facility] in January and subsequent out pt follow up. Past Medical History:   Diagnosis Date    Asthma     COPD     Cystocele, midline     Diabetes mellitus ([address]     GERD (gastroesophageal reflux disease)     Hidradenitis suppurativa     Hyperlipidemia     Hypertension     SHERRIE on CPAP     CPAP    Stress incontinence       Past Surgical History:   Procedure Laterality Date    BREAST SURGERY PROCEDURE UNLISTED      Right breast benign tumor removal    HX APPENDECTOMY      HX CHOLECYSTECTOMY      HX DILATION AND CURETTAGE      Dysfunctional uterine bleeding, thought 2/2 fibroids    HX TUBAL LIGATION        Prior to Admission medications    Medication Sig Start Date End Date Taking?  Authorizing Provider   insulin glargine (LANTUS,BASAGLAR) 100 unit/mL (3 mL) inpn 25 Units by SubCUTAneous route nightly. Indications: type 2 diabetes mellitus   Yes Historical Provider   ipratropium (ATROVENT HFA) 17 mcg/actuation inhaler Take 2 Puffs by inhalation four (4) times daily. Indications: BRONCHOSPASM PREVENTION WITH COPD   Yes Historical Provider   omeprazole (PRILOSEC) 40 mg capsule Take 40 mg by mouth daily. Indications: gastroesophageal reflux disease   Yes Historical Provider   lisinopril (PRINIVIL, ZESTRIL) 40 mg tablet Take 40 mg by mouth daily. Indications: hypertension   Yes Historical Provider   simethicone (MYLICON) 80 mg chewable tablet Take 80 mg by mouth every six (6) hours as needed for Flatulence. Yes Historical Provider   cpap machine kit by Does Not Apply route nightly. Historical Provider   ipratropium (ATROVENT) 0.02 % nebulizer solution Take  by inhalation four (4) times daily as needed. 5/10/16   Historical Provider   OXYGEN-AIR DELIVERY SYSTEMS 2 L by Nasal route continuous. Historical Provider   fluticasone-salmeterol (ADVAIR HFA) 444-13 mcg/actuation inhaler Take 2 Puffs by inhalation two (2) times a day. Historical Provider   Diabetic Supplies, [address] [facility] Diabetic needles 1/2 inch 31 gauge - 1 injection daily 2/14/17   Marzette Numbers, DO   Diabetic Supplies, [address] [facility] Diabetic syringes 1 mL - use one daily 2/14/17   Marzette Numbers, DO   Diabetic Supplies, [address] [facility] Diabetic test strips - use three times daily 2/14/17   Marzette Numbers, DO   Diabetic Supplies, [address] [facility] Diabetic lancets - use three times daily 2/14/17   Marzette Numbers, DO   insulin lispro (HUMALOG) 100 unit/mL injection Check sugars three times a day before meals, if blood sugar is greater than 250 give yourself 8 units of Humalog before eating.   Indications: type 2 diabetes mellitus  Patient taking differently: Check sugars three times a day before meals, if blood sugar is less than 100, then give do not give. If Blood sugar is 100-200, use 5 units. If Blood sugar 200-300, Give 8 units of Humalog before eating. If greater than 300, use 12 units. Indications: type 2 diabetes mellitus 2/14/17   John Kendrick, DO   aspirin delayed-release 81 mg tablet Take 81 mg by mouth daily. Historical Provider   famotidine (PEPCID) 20 mg tablet Take 20 mg by mouth two (2) times daily as needed. Historical Provider   albuterol (PROVENTIL HFA, VENTOLIN HFA, PROAIR HFA) 90 mcg/actuation inhaler Take 2 Puffs by inhalation every four (4) hours as needed for Wheezing. For the next 2 days after hospital discharge, use your inhaler 4 times a day. 3/9/16   Ronaldo Aguilera MD   albuterol (PROVENTIL VENTOLIN) 2.5 mg /3 mL (0.083 %) nebulizer solution 2.5 mg by Nebulization route every four (4) hours as needed for Wheezing. Historical Provider   Inhalational Spacing Device (AEROCHAMBER) 1 Each by Does Not Apply route as needed for Cough or Other (COPD exacerbation). 10/2/12   Danitza South, DO   fluticasone (FLONASE) 50 mcg/actuation nasal spray 2 Sprays by Both Nostrils route daily. Historical Provider   montelukast (SINGULAIR) 10 mg tablet Take 10 mg by mouth nightly. Lauren Allison MD     Allergies   Allergen Reactions    Ancef [Cefazolin] Hives    Contrast Agent [Iodine] Anaphylaxis, Shortness of Breath and Swelling     Needs pre-medication for IV contrast with Benadryl, Solu-Medrol    Fish Containing Products Anaphylaxis     Pt states she had a severe allergic reaction at 9 y/o.  Metformin Other (comments)     Abdominal pain, diarrhea.     Codeine Other (comments)     Altered mental status      Social History   Substance Use Topics    Smoking status: Former Smoker     Packs/day: 1.00     Years: 30.00     Types: Cigarettes     Start date: 5/6/1966     Quit date: 9/6/2006    Smokeless tobacco: Never Used      Comment: 1-1.5 packs per day    Alcohol use No      Family History   Problem Relation Age of Onset    Hypertension Mother     Stroke Mother     Breast Cancer Mother      Bilateral mastectomies    Cancer Mother      ovarian and breast    Heart Failure Mother     Heart Attack Father      2011    Heart Surgery Father      CABG    Heart Failure Father     COPD Sister      Heavy smoker    Cancer Sister      ovarian    Heart Failure Sister     Lung Disease Sister     Asthma Child     Cancer Maternal Aunt      pancreatic    Cancer Maternal Grandfather      stomach      disabled  Immunization status: up to date and documented. Current Facility-Administered Medications   Medication Dose Route Frequency    insulin lispro (HUMALOG) injection 4 Units  4 Units SubCUTAneous TIDAC    insulin glargine (LANTUS) injection 28 Units  28 Units SubCUTAneous QHS    enoxaparin (LOVENOX) injection 40 mg  40 mg SubCUTAneous Q24H    insulin lispro (HUMALOG) injection   SubCUTAneous AC&HS    azithromycin (ZITHROMAX) tablet 250 mg  250 mg Oral DAILY    aspirin delayed-release tablet 81 mg  81 mg Oral DAILY    lisinopril (PRINIVIL, ZESTRIL) tablet 40 mg  40 mg Oral DAILY    montelukast (SINGULAIR) tablet 10 mg  10 mg Oral QHS    pantoprazole (PROTONIX) tablet 40 mg  40 mg Oral ACB    budesonide-formoterol (SYMBICORT) 160-4.5 mcg/actuation HFA inhaler 2 Puff  2 Puff Inhalation BID RT    fluticasone (FLONASE) 50 mcg/actuation nasal spray 2 Spray  2 Spray Both Nostrils DAILY    albuterol-ipratropium (DUO-NEB) 2.5 MG-0.5 MG/3 ML  3 mL Nebulization Q4H RT       Review of Systems:  A comprehensive review of systems was negative except for that written in the HPI.     Objective:   Vital Signs:    Visit Vitals    /84 (BP 1 Location: Left arm, BP Patient Position: At rest)    Pulse 75    Temp 97 °F (36.1 °C)    Resp 18    Ht 5' 3\" (1.6 m)    Wt 114.7 kg (252 lb 12.8 oz)    SpO2 95%    Breastfeeding No    BMI 44.78 kg/m2       O2 Device: Nasal cannula   O2 Flow Rate (L/min): 3 l/min   Temp (24hrs), Av.4 °F (36.3 °C), Min:96.3 °F (35.7 °C), Max:98.5 °F (36.9 °C)       Intake/Output:   Last shift:         Last 3 shifts:  1901 - 03/15 0700  In: 1140 [P.O.:1140]  Out: -     Intake/Output Summary (Last 24 hours) at 03/15/18 1040  Last data filed at 18 1833   Gross per 24 hour   Intake              660 ml   Output                0 ml   Net              660 ml      Physical Exam:   General:  Alert, cooperative, no distress, appears stated age. Head:  Normocephalic, without obvious abnormality, atraumatic. Eyes:  Conjunctivae/corneas clear. PERRL, EOMs intact. Nose: Nares normal. Septum midline. Mucosa normal. No drainage or sinus tenderness. Throat: Lips, mucosa, and tongue normal. Teeth and gums normal.   Neck: Supple, symmetrical, trachea midline, no adenopathy, thyroid: no enlargment/tenderness/nodules, no carotid bruit and no JVD. Back:   Symmetric, no curvature. ROM normal.   Lungs:   Clear to auscultation bilaterally. Chest wall:  No tenderness or deformity. Heart:  Regular rate and rhythm, S1, S2 normal, no murmur, click, rub or gallop. Abdomen:   Soft, non-tender. Bowel sounds normal. No masses,  No organomegaly. Extremities: Extremities normal, atraumatic, no cyanosis or edema. Pulses: 2+ and symmetric all extremities.    Skin: Skin color, texture, turgor normal. No rashes or lesions   Lymph nodes: Cervical, supraclavicular, and axillary nodes normal.   Neurologic: Grossly nonfocal     Data review:     Recent Results (from the past 24 hour(s))   GLUCOSE, POC    Collection Time: 18 11:45 AM   Result Value Ref Range    Glucose (POC) 393 (H) 70 - 110 mg/dL   GLUCOSE, POC    Collection Time: 18  3:36 PM   Result Value Ref Range    Glucose (POC) 336 (H) 70 - 110 mg/dL   GLUCOSE, POC    Collection Time: 18 10:07 PM   Result Value Ref Range    Glucose (POC) 301 (H) 70 - 892 mg/dL   METABOLIC PANEL, BASIC    Collection Time: 03/15/18  3:44 AM   Result Value Ref Range    Sodium 138 136 - 145 mmol/L    Potassium 4.0 3.5 - 5.5 mmol/L    Chloride 103 100 - 108 mmol/L    CO2 27 21 - 32 mmol/L    Anion gap 8 3.0 - 18 mmol/L    Glucose 183 (H) 74 - 99 mg/dL    BUN 28 (H) 7.0 - 18 MG/DL    Creatinine 1.04 0.6 - 1.3 MG/DL    BUN/Creatinine ratio 27 (H) 12 - 20      GFR est AA >60 >60 ml/min/1.73m2    GFR est non-AA 54 (L) >60 ml/min/1.73m2    Calcium 9.0 8.5 - 10.1 MG/DL   CBC WITH AUTOMATED DIFF    Collection Time: 03/15/18  3:44 AM   Result Value Ref Range    WBC 14.1 (H) 4.6 - 13.2 K/uL    RBC 3.72 (L) 4.20 - 5.30 M/uL    HGB 11.6 (L) 12.0 - 16.0 g/dL    HCT 33.7 (L) 35.0 - 45.0 %    MCV 90.6 74.0 - 97.0 FL    MCH 31.2 24.0 - 34.0 PG    MCHC 34.4 31.0 - 37.0 g/dL    RDW 14.3 11.6 - 14.5 %    PLATELET 514 889 - 064 K/uL    MPV 8.9 (L) 9.2 - 11.8 FL    NEUTROPHILS 80 (H) 40 - 73 %    LYMPHOCYTES 14 (L) 21 - 52 %    MONOCYTES 6 3 - 10 %    EOSINOPHILS 0 0 - 5 %    BASOPHILS 0 0 - 2 %    ABS. NEUTROPHILS 11.2 (H) 1.8 - 8.0 K/UL    ABS. LYMPHOCYTES 2.0 0.9 - 3.6 K/UL    ABS. MONOCYTES 0.8 0.05 - 1.2 K/UL    ABS. EOSINOPHILS 0.0 0.0 - 0.4 K/UL    ABS. BASOPHILS 0.0 0.0 - 0.1 K/UL    DF AUTOMATED     GLUCOSE, POC    Collection Time: 03/15/18  9:42 AM   Result Value Ref Range    Glucose (POC) 210 (H) 70 - 110 mg/dL       Imaging:  I have personally reviewed the patients radiographs and have reviewed the reports:  CXR Results  (Last 48 hours)    None        CT Results  (Last 48 hours)    None           PFT Results  2016    FEV1 reduced to 40% predicted with reversibility post bronchodilator, MMEFR reduced to 17% predicted    Impression:    Low FEV1 ratio so evidence of obstructive disease. FEV1 of 1.32  or 51% predicted  Significant  bronchodilator response. Normal TLC so  evidence of restrictive disease. TLC of 111%  predicted.   Reduction in DLCO suggest  abnormality in gas exchange related to  differential including parenchymal lung disease, isolated  reduction can be seen in anemia, chf or pulmonary vascular  disease correlate clinically. DLCO corrected to [place] is normal.  RV/TLC elevated suggest hyperinflation. PFT consistent with moderate to severe obstructive airway disease  with hyper inflation and mild reduction in DLCO. Overall looks  like severe COPD with significant bronchodilator response. Possible ACOS <Asthma COPD Overlap Syndrome>. Echo Results  (Last 48 hours)    None        High complexity decision making was performed during the evaluation of this patient at high risk for decompensation with multiple organ involvement     Above mentioned total time spent on reviewing the case/medical record/data/notes/EMR/patient examination/documentation/coordinating care with nurse/consultants, exclusive of procedures with complex decision making performed and > 50% time spent in face to face evaluation.          Dominic Reed MD

## 2018-03-15 NOTE — PROGRESS NOTES
*ATTENTION:  This note has been created by a medical student for educational purposes only. Please do not refer to the content of this note for clinical decision-making, billing, or other purposes. Please see attending physicians note to obtain clinical information on this patient. *     Progress Note  PF EVMS Service    Patient: Anival Padilla MRN: [id number]   SSN: [id number]  YOB: 1960   Age: 61 y.o. Sex: female      Admit Date: 3/13/2018    LOS: 2 days   Chief Complaint   Patient presents with    Respiratory Distress       Subjective:     Patient reports feeling much better today. She still feels short of breath when standing, but she reports improvement since yesterday. She also reported feeling fatigued overall and some muscle fatigue along her posterior and lateral chest wall. She states that she still feels chest tightness but that has improved slightly. She notes a dry cough still not producing any phlegm, which made it difficult for her to use her CPAP last night. She denies any chest pain, nausea/vomiting, changes in appetite, difficulties with urination or having bowel movements. Review of Systems:  Patient Endorses   ---------------------------------------------------------------------------------  Constitutional: Negative for fever, chills and diaphoresis. HENT: Negative for hearing loss and sore throat. Eyes: Negative for blurred vision and double vision. Respiratory: Negative for cough and hemoptysis. Positive for dry cough. Cardiovascular: Negative for chest pain and palpitations. Gastrointestinal: Negative for nausea and vomiting. Genitourinary: Negative for dysuria and hematuria. Musculoskeletal: Negative for myalgias and joint pain. Positive for muscular fatigue in posterior and lateral chest.  Skin: Negative for itching and rash. Neurological: Negative for dizziness and headaches.    Psychiatric: Negative for depression and suicidal ideas.     Objective:     Vitals:  Visit Vitals    /84 (BP 1 Location: Left arm, BP Patient Position: At rest)    Pulse 75    Temp 97 °F (36.1 °C)    Resp 18    Ht 5' 3\" (1.6 m)    Wt 114.7 kg (252 lb 12.8 oz)    SpO2 95%    Breastfeeding No    BMI 44.78 kg/m2       Physical Exam:   General appearance: alert, cooperative, no distress, appears stated age  Lungs: wheezes diffusely throughout; Speaking in complete sentences without stopping to take a breath  Heart: regular rate and rhythm, S1, S2 normal, no murmur, click, rub or gallop  Abdomen: soft, non-tender. Bowel sounds normal.  Extremities: Pulses 2+ and symmetric, mild hand and foot edema  Skin: Skin color, texture, turgor normal. No rashes or lesions  Neuro:  mental status, speech normal, alert and oriented x iii    Intake and Output:  Past 24 hours:   In: 1140 mL  UOP: 4 unmeasurable voids    Lab/Data Review:  Recent Results (from the past 12 hour(s))   GLUCOSE, POC    Collection Time: 03/14/18 10:07 PM   Result Value Ref Range    Glucose (POC) 301 (H) 70 - 037 mg/dL   METABOLIC PANEL, BASIC    Collection Time: 03/15/18  3:44 AM   Result Value Ref Range    Sodium 138 136 - 145 mmol/L    Potassium 4.0 3.5 - 5.5 mmol/L    Chloride 103 100 - 108 mmol/L    CO2 27 21 - 32 mmol/L    Anion gap 8 3.0 - 18 mmol/L    Glucose 183 (H) 74 - 99 mg/dL    BUN 28 (H) 7.0 - 18 MG/DL    Creatinine 1.04 0.6 - 1.3 MG/DL    BUN/Creatinine ratio 27 (H) 12 - 20      GFR est AA >60 >60 ml/min/1.73m2    GFR est non-AA 54 (L) >60 ml/min/1.73m2    Calcium 9.0 8.5 - 10.1 MG/DL   CBC WITH AUTOMATED DIFF    Collection Time: 03/15/18  3:44 AM   Result Value Ref Range    WBC 14.1 (H) 4.6 - 13.2 K/uL    RBC 3.72 (L) 4.20 - 5.30 M/uL    HGB 11.6 (L) 12.0 - 16.0 g/dL    HCT 33.7 (L) 35.0 - 45.0 %    MCV 90.6 74.0 - 97.0 FL    MCH 31.2 24.0 - 34.0 PG    MCHC 34.4 31.0 - 37.0 g/dL    RDW 14.3 11.6 - 14.5 %    PLATELET 845 491 - 029 K/uL    MPV 8.9 (L) 9.2 - 11.8 FL    NEUTROPHILS 80 (H) 40 - 73 %    LYMPHOCYTES 14 (L) 21 - 52 %    MONOCYTES 6 3 - 10 %    EOSINOPHILS 0 0 - 5 %    BASOPHILS 0 0 - 2 %    ABS. NEUTROPHILS 11.2 (H) 1.8 - 8.0 K/UL    ABS. LYMPHOCYTES 2.0 0.9 - 3.6 K/UL    ABS. MONOCYTES 0.8 0.05 - 1.2 K/UL    ABS. EOSINOPHILS 0.0 0.0 - 0.4 K/UL    ABS. BASOPHILS 0.0 0.0 - 0.1 K/UL    DF AUTOMATED           Assessment and Plan:     62 yo female with PMH of multiple COPD exacerbations, Asthma, T2DM, HTN, SHERRIE, and GERD now HD # 3 after presenting with dyspnea on exertion and COPD exacerbation.     1) COPD Exacerbation:  - Continue Duo-Neb Q4hrs prn  - Continue Prednisone 60 mg PO  - Continue NC Oxygen  - Continue Symbicort BID  - Continue Azithromycin 250 mg qd for 4 days (currently day 2    2) T2DM:  - Continue 28 units of Lantus QHS (changed from 25 yesterday)  - Continue sliding scale of Humalog 3/6/9/12/15 (changed from 2/4/6/8/10 yesterday)    3) HTN:  - Continue home Lisinopril 40 mg qd     4) SHERRIE:  - Continue CPAP at night  - Followup outpatient with Pulmonology/sleep medicine to establish care    5) GERD:  - Continue protonix 40 mg qam with pepcid 20 mg prn for breakthrough reflux     Ninetta Gottron, MS3   March 15, 2018

## 2018-03-15 NOTE — HOME CARE
Rec HC order - for [facility] for COPD [facility] will follow - D Ema VALDEZ

## 2018-03-15 NOTE — PROGRESS NOTES
Intern Progress Note  [facility]       Patient: Olivia Mccollum MRN: [id number]  CSN: [id number]    YOB: 1960  Age: 61 y.o. Sex: female    DOA: 3/13/2018 LOS:  LOS: 2 days                    Subjective:     Acute events:         Pt notes improved breahting today. Ready to go home. Review of Systems   Constitutional: Negative for chills and fever. Gastrointestinal: Negative for abdominal pain, nausea and vomiting. Objective:      Patient Vitals for the past 24 hrs:   Temp Pulse Resp BP SpO2   03/15/18 0447 97 °F (36.1 °C) 81 20 101/62 94 %   03/15/18 0406 - - - - 90 %   03/15/18 0035 97.7 °F (36.5 °C) 88 20 99/63 93 %   03/14/18 2316 - - - - 96 %   03/14/18 2000 98.5 °F (36.9 °C) 85 18 99/61 96 %   03/14/18 1528 96.3 °F (35.7 °C) 99 18 99/55 92 %   03/14/18 1118 97.5 °F (36.4 °C) (!) 103 18 114/67 97 %   03/14/18 0722 97.2 °F (36.2 °C) 88 20 121/71 95 %         Intake/Output Summary (Last 24 hours) at 03/15/18 0716  Last data filed at 03/14/18 1833   Gross per 24 hour   Intake             1140 ml   Output                0 ml   Net             1140 ml         Physical Exam:   General:  Alert and Responsive and in No acute distress. HEENT: No cervical, supraclavicular, occipital or submandibular lymphadenopathy. CV:  RRR, no rubs gallops or murmurs  RESP:  Unlabored breathing. Lungs note prominent wheezes at bases, but improved air movement compared to prior exam.  Equal expansion bilaterally. MS:  No joint deformity or instability. No atrophy. Ext:  No edema. Skin:  No rashes, lesions, or ulcers.        Lab/Data Reviewed:  BMP:   Lab Results   Component Value Date/Time     03/15/2018 03:44 AM    K 4.0 03/15/2018 03:44 AM     03/15/2018 03:44 AM    CO2 27 03/15/2018 03:44 AM    AGAP 8 03/15/2018 03:44 AM     (H) 03/15/2018 03:44 AM    BUN 28 (H) 03/15/2018 03:44 AM    CREA 1.04 03/15/2018 03:44 AM    GFRAA >60 03/15/2018 03:44 AM GFRNA 54 (L) 03/15/2018 03:44 AM     CMP:   Lab Results   Component Value Date/Time     03/15/2018 03:44 AM    K 4.0 03/15/2018 03:44 AM     03/15/2018 03:44 AM    CO2 27 03/15/2018 03:44 AM    AGAP 8 03/15/2018 03:44 AM     (H) 03/15/2018 03:44 AM    BUN 28 (H) 03/15/2018 03:44 AM    CREA 1.04 03/15/2018 03:44 AM    GFRAA >60 03/15/2018 03:44 AM    GFRNA 54 (L) 03/15/2018 03:44 AM    CA 9.0 03/15/2018 03:44 AM     CBC:   Lab Results   Component Value Date/Time    WBC 14.1 (H) 03/15/2018 03:44 AM    HGB 11.6 (L) 03/15/2018 03:44 AM    HCT 33.7 (L) 03/15/2018 03:44 AM     03/15/2018 03:44 AM     All Cardiac Markers in the last 24 hours: No results found for: CPK, CK, CKMMB, CKMB, RCK3, CKMBT, CKNDX, CKND1, KESHIA, TROPT, TROIQ, JOEL, TROPT, TNIPOC, BNP, BNPP  Recent Glucose Results:   Lab Results   Component Value Date/Time     (H) 03/15/2018 03:44 AM         Scheduled Medications Reviewed:  Current Facility-Administered Medications   Medication Dose Route Frequency    insulin lispro (HUMALOG) injection 4 Units  4 Units SubCUTAneous TIDAC    insulin glargine (LANTUS) injection 28 Units  28 Units SubCUTAneous QHS    enoxaparin (LOVENOX) injection 40 mg  40 mg SubCUTAneous Q24H    insulin lispro (HUMALOG) injection   SubCUTAneous AC&HS    predniSONE (DELTASONE) tablet 60 mg  60 mg Oral DAILY WITH BREAKFAST    azithromycin (ZITHROMAX) tablet 250 mg  250 mg Oral DAILY    aspirin delayed-release tablet 81 mg  81 mg Oral DAILY    lisinopril (PRINIVIL, ZESTRIL) tablet 40 mg  40 mg Oral DAILY    montelukast (SINGULAIR) tablet 10 mg  10 mg Oral QHS    pantoprazole (PROTONIX) tablet 40 mg  40 mg Oral ACB    budesonide-formoterol (SYMBICORT) 160-4.5 mcg/actuation HFA inhaler 2 Puff  2 Puff Inhalation BID RT    fluticasone (FLONASE) 50 mcg/actuation nasal spray 2 Spray  2 Spray Both Nostrils DAILY    albuterol-ipratropium (DUO-NEB) 2.5 MG-0.5 MG/3 ML  3 mL Nebulization Q4H RT Imaging, microbiology, and EKG/Telemetry:  Xr Chest Port    Result Date: 3/13/2018  Impression: 1. Scarring in the lung bases. No acute infiltrate appreciated. Assessment/Plan     61 y.o. female with PMH COPD, Asthma, and Diabetes, now admitted with COPD Exacerbation.     Moderate COPD Exacerbation with hx of asthma, Complicated due to multiple admissions in last year-Improving  Rapid Flu A/B: NEG/NEG      Pt notes progressive dry cough and fatigue over the last severl days. Notes Jan-March is the area most  . Endorses compliance and access to medications. S/P Reported magnesium,Duonebx2,Magnesium, and Solumedrol. CXR showed bilateral opacities, but no infiltrate  -Continue NC Humidified with goal O2 of 88-92%  -D ay #3 Azithromycin,  250mg PO   -PO prednisone 60 today, will discharge to taper  -Duoneb Q3H RT  -Continue home atrovent, Advair, Singulair   -Appreciate Pulmonary Recs sicne pt is on maximal outpatient therapy and second admission in three months.     SHERRIE:  -Titrate to home dose per RT or bring in CPAP from home  -Appreciate Pulm Recs and Outpt follow up for pt's request for mask instead of nasal cannula     Leukocytosis due to Steroids, clinically improving  -continue to monitor    Diabetes-Controlled with 3/18 A1c of 7.7  -Increase Lantus 28 units QHS whiel on steroids  -Continued prandial dosing 4 units lispro  -Continue SSI with high resistant regimen     HTN-Stable  -Continue Lisinopril 40     GERD  -Omeprazole daily  -Famotidine PRN     Diet: Diabetic  DVT Prophylaxis: Lovenox  Code Status: Full  Point of Contact:   Sonny Hayden [phone]     Disposition and anticipated LOS: >4 Midnights, Expect discharge home with home health     Jose Maxwell MD PGY-1  [address]

## 2018-03-16 ENCOUNTER — HOME CARE VISIT (OUTPATIENT)
Dept: SCHEDULING | Facility: HOME HEALTH | Age: 58
End: 2018-03-16

## 2018-03-16 ENCOUNTER — PATIENT OUTREACH (OUTPATIENT)
Dept: PULMONOLOGY | Age: 58
End: 2018-03-16

## 2018-03-16 ENCOUNTER — HOME CARE VISIT (OUTPATIENT)
Dept: HOME HEALTH SERVICES | Facility: HOME HEALTH | Age: 58
End: 2018-03-16

## 2018-03-16 PROCEDURE — G0299 HHS/HOSPICE OF RN EA 15 MIN: HCPCS

## 2018-03-16 NOTE — PROGRESS NOTES
Contacted patient per phone number noted in chart. Spoke with patient 's daughter, Luca Hussein ( listed on PMI ) . Verified 2 patient identifiers. Introduced self, role and reason for call. Dion Shahid stated, \" someone already called and said they were from [facility] and  would be out to review my mothe's medications. \"      NN again explained role of writer to Event[employer]. Eventioz stated, \" you guys don't communicate with each other? I am going to call the hospital to see who are all these people calling me. Anyway, you can't talk to her because I don't get off until 3:00. \"    NN asked Dion Shahid if Writer could call back on Monday after 3 to speak with her mother. Dion Shahid stated, \" that would be fine because I have to call to see why all of you are calling my phone. \"      NN  Proceed to give Uli Sigala contact information and Dion Shahid stated, \" I can see it on my phone. \"      NN ended call with Thank you and have a good weekend.

## 2018-03-19 ENCOUNTER — PATIENT OUTREACH (OUTPATIENT)
Dept: PULMONOLOGY | Age: 58
End: 2018-03-19

## 2018-03-19 ENCOUNTER — HOME CARE VISIT (OUTPATIENT)
Dept: HOME HEALTH SERVICES | Facility: HOME HEALTH | Age: 58
End: 2018-03-19

## 2018-03-19 NOTE — PROGRESS NOTES
Bianka Bowie is a 61 y.o. female admitted to [facility] from 3/13/18 to 3/15/18 for COPD with Acute exacerbation. This patient was received as a referral from [facility]   Inpatient RRAT Score: 22  Contacted patient for follow up s/p hospital stay. Called number listed on chart and spoke with patient's daughter, Jennifer Garcia. Jennifer Garcia stated, \" she is at home. I will be there in 20 mins and have her call you back. \"    Writer waited until 5 pm and no return call from patient of patient's daughter, Jennifer Garcia.          Summary of patients top two problems:     Problem 1: COPD     Problem 2: SHERRIE       Advance Care Planning:   Patient was offered the opportunity to discuss advance care planning:  no     Does patient have an Advance Directive:  no   If no, did you provide information on Advance Care Planning?  no     [employer], Referrals, and Durable Medical Equipment:   [address] and DME from [facility]    Follow up appointments:    3/23/18  At 09:40 with Dr. Willam Hoover   4/9/18 at 12:00 with Dr. Cecilia Mann

## 2018-03-20 ENCOUNTER — PATIENT OUTREACH (OUTPATIENT)
Dept: PULMONOLOGY | Age: 58
End: 2018-03-20

## 2018-03-20 NOTE — PROGRESS NOTES
Gricelda Ibarra is a 61 y.o. female admitted to [facility] from 3/13/18 to 3/15/18 for COPD with Acute exacerbation. This patient was received as a referral from [facility]     Inpatient RRAT Score: Edgar. Spoke with Mary. Verified 2 patient identifiers. Introduced self, role and reason for call. Writer asked Mary to provide the number they have on record for patient due to writer unable to make contact with patient but has spoken with the daughter. Mary reports that she will have the nurse that saw the patient on 3/16/18 to give writer a call. Puneet Reynoso from [facility] contacted writer. 2 pateint identifiers given. Puneet Ng reports seeing patient on 3/16/18 for [facility] visit and reported that she called the [phone] number and the person that answered stated that she would have the patient return call to her. Call was return within a hour. Puneet Reynoso reports patient  Didn't have all her prescriptions filled when she was there but patient stated that she would have her daughter filled the prescriptions later on that day. Puneet Reynoso reports that patient's grand daughter was in the home and was rude but once the grand daughter and patient's sister left the home she was able to have a good conversation with  patient. Puneet Reynoso reports patient is sleeping on couch downstairs due to her bedroom is upstairs and patient becomes SOB climbing stairs. Reports patient using her oxygen at       L and CPAP at night. Contacted pateint's daughter, Mario Miles. Verified 2 patient identifiers. Introduced self, role and reason for call. Writer discussed writer's role with Mario Miles and that patient would not be receiving any other visits from [address] [place] [address] Adalid Barnes at this time and Writer will be following patient if okay with her and the patient. Mario Miles stated, \" okay, you can call her at [phone]. Writer thanked Mario Miles and ended the call.       Contacted patient at above number. No answer. Message left on voice mail. Introduced self, role and reason for call. Requested a return call and Nurse Navigator's contact information given. Returned call to patient at above number. Verified 2 patient identifiers. Introduced self, role and reason for call. Patient states, \" I am rough. I have my bad days and good days. The weather going from damp to warm doesn't help. \"      Patient reports:    I'm on my way to the pharmacy to get my medis straighten out. I don't want to duplicate what I already have. I had prednisone already at home so I didn't get that rx filled. Incruse was the only inhaler I didn't  after leaving hospital.  I hope my insurance pays for it. I was on Spiriva. I have been having problems with my blood sugar. They put me on 4 units of humulog with each meal  while on the prednisone and I take 28 units of lantus nightly. My blood sugar was 89 mg/dl this morning and the highest it has been was on 3/19/18 at 189 mg/dl. I didn't take the lantus last night. I am afraid of waking up with a lower blood sugar and I eat  Snack at night. I have an appointment with Dr. Brian Ribera on the 23rd and I will discuss this with him    When blood sugar drops I get a headache and feel tired.      On 2 L of continuous oxygen    Uses CPAP nightly    CHAUDHARY    Prop self on 3 pillows to sleep    Sleeping downstairs on couch since being home due to taxing to go to upstairs bedroom    Completed antibiotics     Continue to taper prednisone as ordered    Nurse came out for [facility]    Patient denies:    Coughing   Wheezing  Chest Pain  N/V  Dizziness    Summary of patients top two problems:                          Problem 1: COPD                          Problem 2: Diabetes       Potential Barriers:   Purchasing her inhalers  Consistent contact with patient      Advance Care Planning:   Patient was offered the opportunity to discuss advance care planning:  no     Does patient have an Advance Directive:  no   If no, did you provide information on Advance Care Planning? no      Community Services, Referrals, and Durable Medical Equipment:   [facility]  Oxygen and DME from [facility]    Writer instructed patient on her insulins, ie. Lantus is long acting and lispro is rapid acting. Instructed to  Keep track of the results, and share them with her doctor. This is very important in order to determine the correct insulin dose. Instructed patient to check her blood sugar before bedtime. Patient voiced understanding. Educated patient to monitor and report the following  Red flags: Increased shortness of breath, wheezing not relieved with albuterol, fever, lethargy, increased fatigue, increased cough, change in sputum color or texture, Irregular HR/Palpitations, having to prop up on pillows to sleep or any new or concerning symptoms. Patient Voiced understanding.         Follow up appointments:    3/23/18  At 09:40 with Dr. Mei Lambert or Dr. Sher Geronimo  4/9/18 at 12:00 with Dr. Renetta Landon    Patient aware of appointments and family will provide her transportation. Plan:    Patient will notify Nurse Navigator if unable to obtain her Incruse or any of her inhalers/nebs. Patient will attend all scheduled appointments  Patient will check blood sugars before bedtime  NN will identify barriers to patient's care  NN will follow up with patient on 3/21/18 re: Rx. Filled at pharmacy. Goals        Chronic Disease     Identification of barriers to adherence to a plan of care such as inability to afford medications, lack of insurance, lack of transportation, etc.. Target date:  4/6/18      Knowledge and adherence of prescribed medication (ie. action, side effects, missed dose, etc.). Target Date:  3/30/18        Diabetes     Understand Diabetic action plan.  (Ie. when to seek medical care,  what to do with high and low blood glucose, how and when to adjust diabetes treatment. Target Date:  4/6/18

## 2018-03-21 ENCOUNTER — PATIENT OUTREACH (OUTPATIENT)
Dept: PULMONOLOGY | Age: 58
End: 2018-03-21

## 2018-03-21 NOTE — PROGRESS NOTES
Contacted patient for follow up on medications. Verified 2 patient identifiers. Introduced self, role and reason for call. Patient reports:    Alright going. Rough day due to the rain and humidity and now I am getting into my car. SOB with min. To moderate exertion depending on what I am doing and the humidity. I sometimes think it is my asthma and not COPD causing the SOB. Have all medications filled at [address] but they had to order the incruse. Takes about 2 days to come . Using oxgyen at 2 L continuously lately    Wearing CPAP most nights but some nights I can't tolerate it. Feels like I am sticking my head out of a moving car and trying to breath    Taking all other medications as recommended.  mg/dl this morning due to I ate late last night     Wt. 250 lbs. Talked to Dr. Segundo Gregory about coming there for my sleep apnea. My sleep doctor at [facility] retired and moved to [facility] [place]. I am in the yellow zone. It has been a long time since I have been in the green zone. Using nebulizer or rescue inhaler at least 2 times a day. Occasional nonproductive cough    Prednisone will be completed on Thursday or Friday. Patient denies:    Wheezing  Chest Pain  Headaches  Dizziness   Increased HR    Writer encouraged patient to alternate rest and activity to conserve energy and if needed to increase oxygen to 2.5 L with activity and decrease to 2 L at rest.  Patient voiced understanding. Writer instructed patient on action of Incruse with teach back performed by patient. Patient encouraged to weigh self daily and to notify MD /NN of 3 lb. Weight gain in a day and 5 lbs. Weight gain in a week. Patient stated, \" I go up and down like that, but they have never had me on fluid pills. I used to take HCTZ but it didn't control my B/P so now I take lisinopril .       Writer encouraged patient to call office if she is unable to get her Incruse and to start using as soon as she receives it. Patient stated, \" I should get it by Thursday or Friday and I will but I shouldn't have any problems getting it. Opportunity to ask questions was provided. Patient has contact information for future reference or further questions. Appointment:    4/9/18 with Dr. Cipriano James    NN will continue to monitor patient weekly or as needed.

## 2018-03-30 ENCOUNTER — PATIENT OUTREACH (OUTPATIENT)
Dept: PULMONOLOGY | Age: 58
End: 2018-03-30

## 2018-03-30 NOTE — PROGRESS NOTES
Contacted  pateint for follow up. No answer. Left message introducing self, role and reason for call. Requested return call.  Contact information provided

## 2018-04-06 ENCOUNTER — PATIENT OUTREACH (OUTPATIENT)
Dept: PULMONOLOGY | Age: 58
End: 2018-04-06

## 2018-04-06 NOTE — PROGRESS NOTES
Contacted  Patient  for follow up. No answer. Left message introducing self, role and reason for call. Requested return call.  Contact information provided

## 2018-04-09 ENCOUNTER — OFFICE VISIT (OUTPATIENT)
Dept: PULMONOLOGY | Age: 58
End: 2018-04-09

## 2018-04-09 VITALS
BODY MASS INDEX: 45.09 KG/M2 | SYSTOLIC BLOOD PRESSURE: 132 MMHG | HEART RATE: 78 BPM | DIASTOLIC BLOOD PRESSURE: 80 MMHG | HEIGHT: 63 IN | TEMPERATURE: 97 F | WEIGHT: 254.5 LBS | OXYGEN SATURATION: 96 % | RESPIRATION RATE: 16 BRPM

## 2018-04-09 DIAGNOSIS — G47.33 OSA ON CPAP: Primary | ICD-10-CM

## 2018-04-09 DIAGNOSIS — J96.10 CHRONIC RESPIRATORY FAILURE, UNSPECIFIED WHETHER WITH HYPOXIA OR HYPERCAPNIA (HCC): ICD-10-CM

## 2018-04-09 DIAGNOSIS — J43.9 PULMONARY EMPHYSEMA, UNSPECIFIED EMPHYSEMA TYPE (HCC): ICD-10-CM

## 2018-04-09 DIAGNOSIS — Z99.89 OSA ON CPAP: Primary | ICD-10-CM

## 2018-04-09 NOTE — PROGRESS NOTES
Patient walked in hallway for 3 min. Became very SOB and had to stop to rest, with lowest sat of 92%. Patient has home oxygen, did not bring today due to being out of portable tank.

## 2018-04-09 NOTE — PATIENT INSTRUCTIONS
Continue Incruse 1 inhalation daily and remember to exhale fully like blowing out candles on the cake before inhaling and when you inhale inhale hard and fast and then hold your breath for 5-10 seconds    Continue Advair 2 puffs twice daily and remember to wash mouth with water and spit it out after inhaling    Albuterol by nebulization every 4 hours as needed or by hand-held inhaler 2 puffs every 4 hours as needed and if you require albuterol too often to control respiratory symptoms call the office for severe symptoms go to the emergency room    Always call for symptoms such as increasing shortness of breath or cough productive of discolored mucus    Begin exercising at least 4 times a week.   Start walking without stopping approximately 2 minutes and gradually increase the time you walk without stopping until you are walking at least 10 minutes or more without stopping    Continue CPAP with sleep and supplemental oxygen with sleep and activity walking more than 350 feet

## 2018-04-09 NOTE — PROGRESS NOTES
[facility]  Pulmonary, Critical Care, and Sleep Medicine      Chief complaint:  COPD chronic respiratory failure and SHERRIE    HPI:    Tonio Negron    is 61years old and comes to the office today for follow-up concerning COPD and chronic respiratory failure and relates that 1 months ago she was again released from the hospital for another exacerbation of COPD. She says she is no longer coughing and usually cough is not part of her symptomatology. She denies chest pain or leg swelling or leg pain at this time. She does have dyspnea with exertion. She continues to take incuse and Advair and albuterol as needed occasionally. She also uses oxygen with activity and sleep      Allergies   Allergen Reactions    Ancef [Cefazolin] Hives    Contrast Agent [Iodine] Anaphylaxis, Shortness of Breath and Swelling     Needs pre-medication for IV contrast with Benadryl, Solu-Medrol    Fish Containing Products Anaphylaxis     Pt states she had a severe allergic reaction at 7 y/o.  Metformin Other (comments)     Abdominal pain, diarrhea.  Codeine Other (comments)     Altered mental status     Current Outpatient Prescriptions   Medication Sig    insulin glargine (LANTUS,BASAGLAR) 100 unit/mL (3 mL) inpn 28 Units by SubCUTAneous route nightly. Indications: type 2 diabetes mellitus    umeclidinium (INCRUSE ELLIPTA) 62.5 mcg/actuation inhaler Take 1 Puff by inhalation daily. Indications: BRONCHOSPASM PREVENTION WITH COPD    ipratropium (ATROVENT) 0.02 % soln 2.5 mL by Nebulization route four (4) times daily as needed. Indications: BRONCHOSPASM PREVENTION WITH COPD    albuterol (PROVENTIL VENTOLIN) 2.5 mg /3 mL (0.083 %) nebulizer solution 3 mL by Nebulization route every four (4) hours as needed for Wheezing. Indications: BRONCHOSPASM PREVENTION, Chronic Obstructive Pulmonary Disease    omeprazole (PRILOSEC) 40 mg capsule Take 40 mg by mouth daily.  Indications: gastroesophageal reflux disease    lisinopril (PRINIVIL, ZESTRIL) 40 mg tablet Take 40 mg by mouth daily. Indications: hypertension    simethicone (MYLICON) 80 mg chewable tablet Take 80 mg by mouth every six (6) hours as needed for Flatulence.  cpap machine kit by Does Not Apply route nightly.  OXYGEN-AIR DELIVERY SYSTEMS 2 L by Nasal route continuous.  fluticasone-salmeterol (ADVAIR HFA) 230-21 mcg/actuation inhaler Take 2 Puffs by inhalation two (2) times a day.  insulin lispro (HUMALOG) 100 unit/mL injection Check sugars three times a day before meals, if blood sugar is greater than 250 give yourself 8 units of Humalog before eating. Indications: type 2 diabetes mellitus (Patient taking differently: Check sugars three times a day before meals, if blood sugar is less than 100, then give do not give. If Blood sugar is 100-200, use 5 units. If Blood sugar 200-300, Give 8 units of Humalog before eating. If greater than 300, use 12 units. Indications: type 2 diabetes mellitus)    aspirin delayed-release 81 mg tablet Take 81 mg by mouth daily.  famotidine (PEPCID) 20 mg tablet Take 20 mg by mouth two (2) times daily as needed.  albuterol (PROVENTIL HFA, VENTOLIN HFA, PROAIR HFA) 90 mcg/actuation inhaler Take 2 Puffs by inhalation every four (4) hours as needed for Wheezing. For the next 2 days after hospital discharge, use your inhaler 4 times a day.  Inhalational Spacing Device (AEROCHAMBER) 1 Each by Does Not Apply route as needed for Cough or Other (COPD exacerbation).  fluticasone (FLONASE) 50 mcg/actuation nasal spray 2 Sprays by Both Nostrils route daily.  montelukast (SINGULAIR) 10 mg tablet Take 10 mg by mouth nightly.  insulin lispro 100 unit/mL inph 4 Units by SubCUTAneous route Before breakfast, lunch, and dinner. Indications: type 2 diabetes mellitus, while taking steroids    Diabetic Supplies, [address]  [facility] Diabetic needles 1/2 inch 31 gauge - 1 injection daily    Diabetic Supplies, [employer]. misc Diabetic test strips - use four times daily    Diabetic Supplies, [address]. misc Diabetic lancets - use three times daily    Diabetic Supplies, [address]. misc Diabetic syringes 1 mL - use one daily     No current facility-administered medications for this visit. Past Medical History:   Diagnosis Date    Asthma     COPD     Cystocele, midline     Diabetes mellitus ([address]     GERD (gastroesophageal reflux disease)     Hidradenitis suppurativa     Hyperlipidemia     Hypertension     SHERRIE on CPAP     CPAP    Stress incontinence      Past Surgical History:   Procedure Laterality Date    BREAST SURGERY PROCEDURE UNLISTED      Right breast benign tumor removal    HX APPENDECTOMY      HX CHOLECYSTECTOMY      HX DILATION AND CURETTAGE      Dysfunctional uterine bleeding, thought 2/2 fibroids    HX TUBAL LIGATION       Social History     Social History    Marital status: LEGALLY      Spouse name: N/A    Number of children: N/A    Years of education: N/A     Occupational History    Not on file.      Social History Main Topics    Smoking status: Former Smoker     Packs/day: 1.00     Years: 30.00     Types: Cigarettes     Start date: 5/6/1966     Quit date: 9/6/2006    Smokeless tobacco: Never Used      Comment: 1-1.5 packs per day    Alcohol use No    Drug use: No    Sexual activity: No     Other Topics Concern    Not on file     Social History Narrative     Family History   Problem Relation Age of Onset    Hypertension Mother     Stroke Mother     Breast Cancer Mother      Bilateral mastectomies    Cancer Mother      ovarian and breast    Heart Failure Mother     Heart Attack Father      2011    Heart Surgery Father      CABG    Heart Failure Father     COPD Sister      Heavy smoker    Cancer Sister      ovarian    Heart Failure Sister     Lung Disease Sister     Asthma Child     Cancer Maternal Aunt      pancreatic    Cancer Maternal Grandfather stomach       Review of systems:  She denies fever chills poor appetite or weight loss    Physical Exam:  Visit Vitals    /80 (BP 1 Location: Right arm, BP Patient Position: Sitting)    Pulse 78    Temp 97 °F (36.1 °C) (Oral)    Resp 16    Ht 5' 3\" (1.6 m)    Wt 115.4 kg (254 lb 8 oz)    SpO2 96%    BMI 45.08 kg/m2       Well-developed well-nourished and obese  HEENT: WNL  Lymph node exam: Supraclavicular cervical lymph nodes negative  Chest: Equal symmetrical expansion no dullness and absence of wheezes rales rubs  Heart: Regular rhythm no gallop no murmur no JVD no peripheral edema  Extremities: No cyanosis clubbing or calf tenderness  Neurological: Alert and oriented    Labs:    O2 sat room air at rest 96% and with walking 350 feet O2 sat 92%    Impression:     COPD chronic respiratory failure stable  By history SHERRIE with CPAP needs sleep physician follow-up  Plan:   Oxygen with sleep and with walking more than 350 feet w  Appointment with Dr. Angi Cordero for SHERRIE  Continue Advair Incruse as needed albuterol  Start exercise program with eventual follow-up with pulmonary rehab  Follow-up in 3 months    Miladys Smith MD , [facility]    CC: Richard Fernandez MD     [address] [facility]  [place], [address] [address]     P: [phone]     F: [phone]

## 2018-04-09 NOTE — MR AVS SNAPSHOT
[facility], [address] [address] 
[phone] Patient: Mehdi Tomas MRN: [id number] VDQ:[id number] Visit Information Date & Time Provider Department Dept. Phone Encounter #  
 4/9/2018 12:00 PM Jesus Grove MD [facility] Lisa Villaseñor [id number] Follow-up Instructions Return in about 3 months (around 7/9/2018). Follow-up and Disposition History Your Appointments 5/30/2018  2:30 PM  
New Patient with Ayesha Fleming DO [address] ([facility]) Appt Note: PER EJF - SLEEP STUDY EVMS(SENT FOR RECORDS) @2016 - BRING CPAP/CHIP  
 [address] [place] [address] Street, [address] [address]  
[phone]  
  
   
 [address] [place] [address], [address] [facility] [place] [address] Upcoming Health Maintenance Date Due Hepatitis C Screening 1960 FOOT EXAM Q1 8/21/1969 MICROALBUMIN Q1 8/21/1969 EYE EXAM RETINAL OR DILATED Q1 8/21/1969 Pneumococcal 19-64 Medium Risk (1 of 1 - PPSV23) 8/21/1978 DTaP/Tdap/Td series (1 - Tdap) 8/21/1980 PAP AKA CERVICAL CYTOLOGY 8/21/1980 FOBT Q 1 YEAR AGE 50-75 8/21/2009 LIPID PANEL Q1 11/20/2013 MEDICARE YEARLY EXAM 3/14/2018 HEMOGLOBIN A1C Q6M 7/24/2018 BREAST CANCER SCRN MAMMOGRAM 11/16/2018 Allergies as of 4/9/2018  Review Complete On: 4/9/2018 By: Shena Joya RN Severity Noted Reaction Type Reactions Ancef [Cefazolin] High 09/30/2012    Hives Contrast Agent [Iodine] High 09/30/2012    Anaphylaxis, Shortness of Breath, Swelling Needs pre-medication for IV contrast with Benadryl, Solu-Medrol Fish Containing Products High 02/10/2017   Intolerance Anaphylaxis Pt states she had a severe allergic reaction at 7 y/o. Metformin  05/24/2015   Intolerance Other (comments) Abdominal pain, diarrhea. Codeine Low 09/30/2012    Other (comments) Altered mental status Current Immunizations  Never Reviewed Name Date Influenza Vaccine (Quad) PF 1/26/2018  4:19 PM  
  
 Not reviewed this visit You Were Diagnosed With   
  
 Codes Comments SHERRIE on CPAP    -  Primary ICD-10-CM: G47.33, Z99.89 ICD-9-CM: 327.23, V46.8 Chronic respiratory failure, unspecified whether with hypoxia or hypercapnia ([facility] [address]     ICD-10-CM: J96.10 ICD-9-CM: 518.83 Pulmonary emphysema, unspecified emphysema type ([facility]     ICD-10-CM: J43.9 ICD-9-CM: 492.8 Vitals BP Pulse Temp Resp Height(growth percentile) Weight(growth percentile) 132/80 (BP 1 Location: Right arm, BP Patient Position: Sitting) 78 97 °F (36.1 °C) (Oral) 16 5' 3\" (1.6 m) 254 lb 8 oz (115.4 kg) SpO2 BMI OB Status Smoking Status 96% 45.08 kg/m2 Menopause Former Smoker Vitals History BMI and BSA Data Body Mass Index Body Surface Area 45.08 kg/m 2 2.26 m 2 Preferred Pharmacy Pharmacy Name Phone [address] [facility] [phone] Your Updated Medication List  
  
   
This list is accurate as of 4/9/18  1:22 PM.  Always use your most recent med list.  
  
  
  
  
 Sara Disla 346-07 mcg/actuation inhaler Generic drug:  fluticasone-salmeterol Take 2 Puffs by inhalation two (2) times a day. * albuterol 90 mcg/actuation inhaler Commonly known as:  PROVENTIL HFA, VENTOLIN HFA, PROAIR HFA Take 2 Puffs by inhalation every four (4) hours as needed for Wheezing. For the next 2 days after hospital discharge, use your inhaler 4 times a day. * albuterol 2.5 mg /3 mL (0.083 %) nebulizer solution Commonly known as:  PROVENTIL VENTOLIN  
3 mL by Nebulization route every four (4) hours as needed for Wheezing. Indications: BRONCHOSPASM PREVENTION, Chronic Obstructive Pulmonary Disease  
  
 aspirin delayed-release 81 mg tablet Take 81 mg by mouth daily. cpap machine kit by Does Not Apply route nightly. * Diabetic Supplies, [address] [facility] Diabetic syringes 1 mL - use one daily * Diabetic Supplies, [address] [facility] Diabetic needles 1/2 inch 31 gauge - 1 injection daily * Diabetic Supplies, [address] [facility] Diabetic test strips - use four times daily * Diabetic Supplies, [address] [facility] Diabetic lancets - use three times daily  
  
 famotidine 20 mg tablet Commonly known as:  PEPCID Take 20 mg by mouth two (2) times daily as needed. FLONASE 50 mcg/actuation nasal spray Generic drug:  fluticasone 2 Sprays by Both Nostrils route daily. inhalational spacing device Commonly known as:  AEROCHAMBER  
1 Each by Does Not Apply route as needed for Cough or Other (COPD exacerbation). insulin glargine 100 unit/mL (3 mL) Inpn Commonly known as:  LANTUSBASAGLAR  
28 Units by SubCUTAneous route nightly. Indications: type 2 diabetes mellitus * insulin lispro 100 unit/mL injection Commonly known as:  HumaLOG U-100 Insulin Check sugars three times a day before meals, if blood sugar is greater than 250 give yourself 8 units of Humalog before eating. Indications: type 2 diabetes mellitus * insulin lispro 100 unit/mL Inph  
4 Units by SubCUTAneous route Before breakfast, lunch, and dinner. Indications: type 2 diabetes mellitus, while taking steroids  
  
 ipratropium 0.02 % Soln Commonly known as:  ATROVENT  
2.5 mL by Nebulization route four (4) times daily as needed. Indications: BRONCHOSPASM PREVENTION WITH COPD  
  
 lisinopril 40 mg tablet Commonly known as:  Sanjay Handing Take 40 mg by mouth daily. Indications: hypertension  
  
 omeprazole 40 mg capsule Commonly known as:  PRILOSEC Take 40 mg by mouth daily. Indications: gastroesophageal reflux disease OXYGEN-AIR DELIVERY SYSTEMS  
2 L by Nasal route continuous. [facility]  
  
 simethicone 80 mg chewable tablet Commonly known as:  Nguyen Brooks  
 Take 80 mg by mouth every six (6) hours as needed for Flatulence. SINGULAIR 10 mg tablet Generic drug:  montelukast  
Take 10 mg by mouth nightly. umeclidinium 62.5 mcg/actuation inhaler Commonly known as:  INCRUSE ELLIPTA Take 1 Puff by inhalation daily. Indications: BRONCHOSPASM PREVENTION WITH COPD * Notice: This list has 8 medication(s) that are the same as other medications prescribed for you. Read the directions carefully, and ask your doctor or other care provider to review them with you. Follow-up Instructions Return in about 3 months (around 7/9/2018). Patient Instructions Continue Incruse 1 inhalation daily and remember to exhale fully like blowing out candles on the cake before inhaling and when you inhale inhale hard and fast and then hold your breath for 5-10 seconds Continue Advair 2 puffs twice daily and remember to wash mouth with water and spit it out after inhaling Albuterol by nebulization every 4 hours as needed or by hand-held inhaler 2 puffs every 4 hours as needed and if you require albuterol too often to control respiratory symptoms call the office for severe symptoms go to the emergency room Always call for symptoms such as increasing shortness of breath or cough productive of discolored mucus Begin exercising at least 4 times a week. Start walking without stopping approximately 2 minutes and gradually increase the time you walk without stopping until you are walking at least 10 minutes or more without stopping Continue CPAP with sleep and supplemental oxygen with sleep and activity walking more than 350 feet Patient Instructions History Introducing [address] introduces [employer] patient portal. Now you can access parts of your medical record, email your doctor's office, and request medication refills online. 1. In your internet browser, go to [email or web address] 2. Click on the First Time User? Click Here link in the Sign In box. You will see the New Member Sign Up page. 3. Enter your [employer] Access Code exactly as it appears below. You will not need to use this code after youve completed the sign-up process. If you do not sign up before the expiration date, you must request a new code. · [employer] Access Code: [email or web address] Expires: 4/12/2018 10:27 PM 
 
4. Enter the last four digits of your Social Security Number (xxxx) and Date of Birth (mm/dd/yyyy) as indicated and click Submit. You will be taken to the next sign-up page. 5. Create a [employer] ID. This will be your [employer] login ID and cannot be changed, so think of one that is secure and easy to remember. 6. Create a [employer] password. You can change your password at any time. 7. Enter your Password Reset Question and Answer. This can be used at a later time if you forget your password. 8. Enter your e-mail address. You will receive e-mail notification when new information is available in [address] 9. Click Sign Up. You can now view and download portions of your medical record. 10. Click the Download Summary menu link to download a portable copy of your medical information. If you have questions, please visit the Frequently Asked Questions section of the [employer] website. Remember, [employer] is NOT to be used for urgent needs. For medical emergencies, dial 911. Now available from your iPhone and Android! Please provide this summary of care documentation to your next provider. Your primary care clinician is listed as HAYDEN Quintero. If you have any questions after today's visit, please call [phone].

## 2018-04-13 ENCOUNTER — PATIENT OUTREACH (OUTPATIENT)
Dept: PULMONOLOGY | Age: 58
End: 2018-04-13

## 2018-04-13 NOTE — PROGRESS NOTES
Contacted  Patient  for follow up. No answer. Left message introducing self, role and reason for call. Requested return call. Contact information provided. Patient did attend follow up visit with Dr. Silke Rogers on 4/9/18. NN has been unable to reach patient for the last 2 outreaches. NN will continue to attempt contact with patient using cell number given by patent's daughter.

## 2018-04-25 ENCOUNTER — PATIENT OUTREACH (OUTPATIENT)
Dept: PULMONOLOGY | Age: 58
End: 2018-04-25

## 2018-05-07 ENCOUNTER — TELEPHONE (OUTPATIENT)
Dept: PULMONOLOGY | Age: 58
End: 2018-05-07

## 2018-05-07 NOTE — TELEPHONE ENCOUNTER
Pt was contacted by [facility] and advised pt needs to get re-cert for oxygen. Pt seen 1 month ago but unable to get full walk test to use for recert. Pt to return in 3 months. Appt given for end of June for 3 mo f/up and recert for oxygen.

## 2018-05-30 ENCOUNTER — OFFICE VISIT (OUTPATIENT)
Dept: PULMONOLOGY | Age: 58
End: 2018-05-30

## 2018-05-30 VITALS
TEMPERATURE: 97.6 F | HEART RATE: 81 BPM | WEIGHT: 253.6 LBS | BODY MASS INDEX: 44.93 KG/M2 | HEIGHT: 63 IN | SYSTOLIC BLOOD PRESSURE: 130 MMHG | RESPIRATION RATE: 18 BRPM | OXYGEN SATURATION: 97 % | DIASTOLIC BLOOD PRESSURE: 76 MMHG

## 2018-05-30 DIAGNOSIS — F15.10 CAFFEINE ABUSE (HCC): ICD-10-CM

## 2018-05-30 DIAGNOSIS — I10 ESSENTIAL HYPERTENSION, BENIGN: Chronic | ICD-10-CM

## 2018-05-30 DIAGNOSIS — E66.01 OBESITY, CLASS III, BMI 40-49.9 (MORBID OBESITY) (HCC): ICD-10-CM

## 2018-05-30 DIAGNOSIS — K21.9 GASTROESOPHAGEAL REFLUX DISEASE WITHOUT ESOPHAGITIS: Chronic | ICD-10-CM

## 2018-05-30 DIAGNOSIS — G47.33 OSA ON CPAP: Primary | Chronic | ICD-10-CM

## 2018-05-30 DIAGNOSIS — J43.9 PULMONARY EMPHYSEMA, UNSPECIFIED EMPHYSEMA TYPE (HCC): ICD-10-CM

## 2018-05-30 DIAGNOSIS — Z99.89 OSA ON CPAP: Primary | Chronic | ICD-10-CM

## 2018-05-30 DIAGNOSIS — E11.21 TYPE 2 DIABETES WITH NEPHROPATHY (HCC): ICD-10-CM

## 2018-05-30 DIAGNOSIS — J96.11 CHRONIC RESPIRATORY FAILURE WITH HYPOXIA (HCC): ICD-10-CM

## 2018-05-30 DIAGNOSIS — Z78.9 DIFFICULTY WITH CPAP NASAL MASK USE: ICD-10-CM

## 2018-05-30 NOTE — MR AVS SNAPSHOT
[address] [facility], [address] [address] 
[phone] Patient: Debra Luis MRN: [id number] WNS:[birth year] Visit Information Date & Time Provider Department Dept. Phone Encounter #  
 5/30/2018  2:30 PM [facility] Lisa Villaseñor [id number] Follow-up Instructions Return in about 2 months (around 7/30/2018). Your Appointments 6/27/2018  9:30 AM  
Follow Up with Dwight Vaca MD  
[address] ([address]) Appt Note: 3mo followup and Oxygen recert with walk testing [facility] [address] [place] [address], [address] [address]  
[phone]  
  
   
 [address] [place] [address], [address] [place] [address],[address] [place] [address]  
  
    
 7/27/2018 11:00 AM  
Follow Up with Abdon Dumont DO [address] ([address]) Appt Note: FROM 5/30/18  
 [address] [place] [address], [address] [address]  
[phone]  
  
   
 [address] [place] [address], [address] [place] [address],[address] [place] [address] Upcoming Health Maintenance Date Due Hepatitis C Screening 1960 FOOT EXAM Q1 8/21/1969 MICROALBUMIN Q1 8/21/1969 EYE EXAM RETINAL OR DILATED Q1 8/21/1969 Pneumococcal 19-64 Medium Risk (1 of 1 - PPSV23) 8/21/1978 DTaP/Tdap/Td series (1 - Tdap) 8/21/1980 PAP AKA CERVICAL CYTOLOGY 8/21/1980 FOBT Q 1 YEAR AGE 50-75 8/21/2009 LIPID PANEL Q1 11/20/2013 MEDICARE YEARLY EXAM 3/14/2018 HEMOGLOBIN A1C Q6M 7/24/2018 Influenza Age 5 to Adult 8/1/2018 BREAST CANCER SCRN MAMMOGRAM 11/16/2018 Allergies as of 5/30/2018  Review Complete On: 5/30/2018 By: Saud Hicks LPN Severity Noted Reaction Type Reactions Ancef [Cefazolin] High 09/30/2012    Hives Contrast Agent [Iodine] High 09/30/2012    Anaphylaxis, Shortness of Breath, Swelling Needs pre-medication for IV contrast with Benadryl, Solu-Medrol Fish Containing Products High 02/10/2017   Intolerance Anaphylaxis Pt states she had a severe allergic reaction at 7 y/o. Metformin  05/24/2015   Intolerance Other (comments) Abdominal pain, diarrhea. Codeine Low 09/30/2012    Other (comments) Altered mental status Current Immunizations  Never Reviewed Name Date Influenza Vaccine (Quad) PF 1/26/2018  4:19 PM  
  
 Not reviewed this visit You Were Diagnosed With   
  
 Codes Comments SHERRIE on CPAP    -  Primary ICD-10-CM: G47.33, Z99.89 ICD-9-CM: 327.23, V46.8 Pulmonary emphysema, unspecified emphysema type ([facility]     ICD-10-CM: J43.9 ICD-9-CM: 492.8 Difficulty with CPAP nasal mask use     ICD-10-CM: Z78.9 ICD-9-CM: V49.89 Chronic respiratory failure with hypoxia (HCC)     ICD-10-CM: J96.11 
ICD-9-CM: 518.83, 799.02 Gastroesophageal reflux disease without esophagitis     ICD-10-CM: K21.9 ICD-9-CM: 530.81 Essential hypertension, benign     ICD-10-CM: I10 
ICD-9-CM: 401.1 Obesity, Class III, BMI 40-49.9 (morbid obesity) (HCC)     ICD-10-CM: E66.01 
ICD-9-CM: 278.01 Type 2 diabetes with nephropathy (HCC)     ICD-10-CM: E11.21 
ICD-9-CM: 250.40, 583.81 Vitals BP Pulse Temp Resp Height(growth percentile) Weight(growth percentile) 130/76 (BP 1 Location: Left arm, BP Patient Position: Sitting) 81 97.6 °F (36.4 °C) (Oral) 18 5' 3\" (1.6 m) 253 lb 9.6 oz (115 kg) SpO2 BMI OB Status Smoking Status 97% 44.92 kg/m2 Menopause Former Smoker BMI and BSA Data Body Mass Index Body Surface Area 44.92 kg/m 2 2.26 m 2 Preferred Pharmacy Pharmacy Name Phone [address] [place] [address]. [phone] Your Updated Medication List  
  
   
This list is accurate as of 5/30/18  3:00 PM.  Always use your most recent med list.  
  
  
  
  
 [facility] 622-13 mcg/actuation inhaler Generic drug:  fluticasone-salmeterol Take 2 Puffs by inhalation two (2) times a day. * albuterol 90 mcg/actuation inhaler Commonly known as:  PROVENTIL HFA, VENTOLIN HFA, PROAIR HFA Take 2 Puffs by inhalation every four (4) hours as needed for Wheezing. For the next 2 days after hospital discharge, use your inhaler 4 times a day. * albuterol 2.5 mg /3 mL (0.083 %) nebulizer solution Commonly known as:  PROVENTIL VENTOLIN  
3 mL by Nebulization route every four (4) hours as needed for Wheezing. Indications: BRONCHOSPASM PREVENTION, Chronic Obstructive Pulmonary Disease  
  
 aspirin delayed-release 81 mg tablet Take 81 mg by mouth daily. cpap machine kit  
by Does Not Apply route nightly. * Diabetic Supplies, [address] [facility] Diabetic syringes 1 mL - use one daily * Diabetic Supplies, [address] [facility] Diabetic needles 1/2 inch 31 gauge - 1 injection daily * Diabetic Supplies, [address] [facility] Diabetic test strips - use four times daily * Diabetic Supplies, [address] [facility] Diabetic lancets - use three times daily  
  
 famotidine 20 mg tablet Commonly known as:  PEPCID Take 20 mg by mouth two (2) times daily as needed. FLONASE 50 mcg/actuation nasal spray Generic drug:  fluticasone 2 Sprays by Both Nostrils route daily. inhalational spacing device Commonly known as:  AEROCHAMBER  
1 Each by Does Not Apply route as needed for Cough or Other (COPD exacerbation). insulin glargine 100 unit/mL (3 mL) Inpn Commonly known as:  LANTUSBASAGLAR  
28 Units by SubCUTAneous route nightly. Indications: type 2 diabetes mellitus * insulin lispro 100 unit/mL injection Commonly known as:  HumaLOG U-100 Insulin Check sugars three times a day before meals, if blood sugar is greater than 250 give yourself 8 units of Humalog before eating. Indications: type 2 diabetes mellitus * insulin lispro 100 unit/mL Inph  
4 Units by SubCUTAneous route Before breakfast, lunch, and dinner. Indications: type 2 diabetes mellitus, while taking steroids  
  
 ipratropium 0.02 % Soln Commonly known as:  ATROVENT  
2.5 mL by Nebulization route four (4) times daily as needed. Indications: BRONCHOSPASM PREVENTION WITH COPD  
  
 lisinopril 40 mg tablet Commonly known as:  Travis Peek Take 40 mg by mouth daily. Indications: hypertension  
  
 omeprazole 40 mg capsule Commonly known as:  PRILOSEC Take 40 mg by mouth daily. Indications: gastroesophageal reflux disease OXYGEN-AIR DELIVERY SYSTEMS  
2 L by Nasal route continuous. [facility]  
  
 simethicone 80 mg chewable tablet Commonly known as:  Andrew Kocher Take 80 mg by mouth every six (6) hours as needed for Flatulence. SINGULAIR 10 mg tablet Generic drug:  montelukast  
Take 10 mg by mouth nightly. umeclidinium 62.5 mcg/actuation inhaler Commonly known as:  INCRUSE ELLIPTA Take 1 Puff by inhalation daily. Indications: BRONCHOSPASM PREVENTION WITH COPD * Notice: This list has 8 medication(s) that are the same as other medications prescribed for you. Read the directions carefully, and ask your doctor or other care provider to review them with you. Follow-up Instructions Return in about 2 months (around 7/30/2018). Introducing [facility]! 3 [facility] introduces [place] patient portal. Now you can access parts of your medical record, email your doctor's office, and request medication refills online. 1. In your internet browser, go to [email or web address] 2. Click on the First Time User? Click Here link in the Sign In box. You will see the New Member Sign Up page. 3. Enter your [place] Access Code exactly as it appears below. You will not need to use this code after youve completed the sign-up process.  If you do not sign up before the expiration date, you must request a new code. · [employer] Access Code: [email or web address] Expires: 7/16/2018  5:24 AM 
 
4. Enter the last four digits of your Social Security Number (xxxx) and Date of Birth (mm/dd/yyyy) as indicated and click Submit. You will be taken to the next sign-up page. 5. Create a [employer] ID. This will be your [employer] login ID and cannot be changed, so think of one that is secure and easy to remember. 6. Create a [employer] password. You can change your password at any time. 7. Enter your Password Reset Question and Answer. This can be used at a later time if you forget your password. 8. Enter your e-mail address. You will receive e-mail notification when new information is available in [address] 9. Click Sign Up. You can now view and download portions of your medical record. 10. Click the Download Summary menu link to download a portable copy of your medical information. If you have questions, please visit the Frequently Asked Questions section of the [employer] website. Remember, [employer] is NOT to be used for urgent needs. For medical emergencies, dial 911. Now available from your iPhone and Android! Please provide this summary of care documentation to your next provider. Your primary care clinician is listed as HAYDEN Francisco. If you have any questions after today's visit, please call [phone].

## 2018-05-30 NOTE — PROGRESS NOTES
[facility] and [facility]  Pulmonary, Critical Care, and Sleep Medicine    Office Progress Note- Initial Evaluation      Primary Care Physician: Benny Zabala MD     Reason for Visit:  Evaluation for SHERRIE and CPAP therapy    Assessment:  1. COPD on LTOT: 2LPM  2. Difficulty with CPAP  3. SHERRIE: CPAP 9; EPR:2  4. Caffeine Abuse  5. Poor Sleep Hygiene  6. Diabetes  7. Hypertension    Discussion:  Ms. Elli Verma is a 61 y.o. female who has multiple medical problems to include COPD with LTOT and SHERRIE. She has had at least two COPD exacerbations this year requiring hospitalization. Over the last few weeks she has noticed episodic arousals which she feels are similar to the arousals she experienced prior to her initial diagnosis of SHERRIE. She also reports awakening with a rapid heart rate but she is unable to ascertain which symptoms comes first.    She does appear to take in an excess amount of caffeine daily. I have asked that she taper down her caffeine intake. In reviewing her compliance report she does have some leaks. She is currently using a nasal mask from last November. Will try to switch patient over to full face mask and see if her symptoms improve. If not will need to have the patient undergo a repeat PAP titration study. Plan:  · CPAP hygiene- patient has had two exacerbations this year- not clear if there could be correlation  · Order new CPAP supplies- full face mask- DME was Contacted today  · Patient to call our office if symptoms persist and I will order PAP titration study  · Cutback on caffeine intake  · Recommend cardiac evaluation to further assess patient's report of nocturnal rapid heart rate  · Potential consequences of untreated sleep apnea, and/or excessive daytime sleepiness were discussed with the patient. · Educational materials provided.   · Treatment options including CPAP, dental appliance, weight reduction measures, positional therapy, surgeries etc were discussed. · Healthy lifestyle changes to include weight loss and exercise discussed. · Healthy sleep habits were reviewed and encouraged. ·  and workplace safety reviewed and discussed as appropriate. Drowsy and/or inattentive driving should be avoided. · Follow-up in 2 months, sooner should new symptoms or problems arise. History of Present Illness: Mrs. Angelica Jones is a 61 y.o. female patient who presents for evaluation of SHERRIE and CPAP therapy. The history was provided by the patient. The patient is followed in our clinic for COPD by Dr. Jayjay Rob. She had previously been followed for her SHERRIE and CPAP therapy by Dr. Preeti Staley at [facility]. However, Dr. Preeti Staley is no longer there and the patient is here to establish sleep medicine care. She has been using CPAP for years. She has not received new CPAP supplies since the Fall for 2017. Her most recent sleep study was performed on 8/4/16. She was noted to have  An overall AHI of 12. She is currently treated with a CPAP of 9 cwp EPR:2.    She also reports being hospitalized in January and March for COPD exacerbations requiring hospital BIPAP therapy. Sometime in April the patient reports waking up from a deep sleep gasping for air. Symptoms remind her of how she felt prior to her initial diagnosis of SHERRIE. She also feels some chest tightness and rapid heart rate but can't tell which symptoms comes first. No pain or tingling radiating up neck or down her arms. No diaphoresis, water brash , emesis or syncope associate with symptoms. Occupation:    Not working                       Driving: Yes  Drowsy Driving: is not reported. Motor vehicle accident(s) associated with drowsy driving:is denied by the patient       Naps: are rarely reported. Leg Symptoms/Pain: She does not have unpleasant or crawling sensation in legs or strong urge to move when inactive. She does have diabetic neuropathy.  She feels she does kick her legs at night.    GERD: is reported and controlled with Prilosec and Pepcid     Mood: Normally has a stable mood. She does get mood swings with prednisone. She is under more stress lately due to her father is ill and terminal    Sleep-Wake History:     Estimates sleeping approximately 4-5 hours per night/day. Sleeps with 2-3 pillows. This has improved since starting CPAP therapy. Prior to CPAP therapy she use to sleep in a recliner chair. She normally  gets into bed at 2300. She turns on TV for white noise. Reads for two hours. Falls asleep by 0200 and is up by [id number]. Sometimes she will go back to sleep until 0800. She awakens with a headache 2-3 times weekly and dry mouth.- Headaches are worse if she forgets/ or does not  use her CPAP. Denies symptoms suggestive of cataplexy, sleep paralysis, hypnagogic and/or hypnopompic hallucinations.     Denies sleep talking/ walking, or other parasomnia behaviors      PHQ over the last two weeks 5/30/2018   Little interest or pleasure in doing things Several days   Feeling down, depressed or hopeless Several days   Total Score PHQ 2 2       [place] Scale 5/30/2018   Sitting and Reading 0   Watching TV 1   Sitting, inactive in a public place (e.g. a movie theater or meeting) 0   As a passenger in a car for an hour, without a break 0   Lying down to rest in the afternoon, when circumstances permit 1   Sitting and talking to someone 0   Sitting quietly after lunch without alcohol 0   In a car, while stopped for a few minutes in traffic 0   [place] Sleepiness Score 2     Positive Airway Pressure (PAP) Compliance  Report & Summary Trends  DME: CPAP: MED, Oxygen : [facility]    Date >4 hr use % Time  (Total Time%) AHI PAP Rx Median Use Time Leak-Median  (95%) Notes   3/2/2018-05/30/18 43 (78) 3.3 CPAP 9 cwp EPR:2 04:14:00 7.6 (35.8) O2 @ 2LPM                                    Past Medical History:  Past Medical History:   Diagnosis Date    Asthma     COPD     Cystocele, midline     Diabetes mellitus ([facility] [address]     GERD (gastroesophageal reflux disease)     Hidradenitis suppurativa     Hyperlipidemia     Hypertension     SHERRIE on CPAP     CPAP    Stress incontinence        Past Surgical History:  Past Surgical History:   Procedure Laterality Date    BREAST SURGERY PROCEDURE UNLISTED      Right breast benign tumor removal    HX APPENDECTOMY      HX CHOLECYSTECTOMY      HX DILATION AND CURETTAGE      Dysfunctional uterine bleeding, thought 2/2 fibroids    HX TUBAL LIGATION         Family History:  Family History   Problem Relation Age of Onset    Hypertension Mother     Stroke Mother     Breast Cancer Mother      Bilateral mastectomies    Cancer Mother      ovarian and breast    Heart Failure Mother     Heart Attack Father      2011    Heart Surgery Father      CABG    Heart Failure Father     COPD Sister      Heavy smoker    Cancer Sister      ovarian    Heart Failure Sister     Lung Disease Sister     Asthma Child     Cancer Maternal Aunt      pancreatic    Cancer Maternal Grandfather      stomach       Social History:  Social History   Substance Use Topics    Smoking status: Former Smoker     Packs/day: 1.00     Years: 30.00     Types: Cigarettes     Start date: 5/6/1966     Quit date: 9/6/2006    Smokeless tobacco: Never Used      Comment: 1-1.5 packs per day    Alcohol use No        Caffeine Amount Time of last Intake Comments   Coffee 2 cups/am     Soda 2L/1.5 days  Diet Coke or Ginger Ale   Tea Rare     Energy Drinks None     Over- the - counter stimulant pills None     Other Substances      Alcohol None     Tobacco None     Drugs None         Medications:  Current Outpatient Prescriptions on File Prior to Visit   Medication Sig Dispense Refill    insulin glargine (LANTUS,BASAGLAR) 100 unit/mL (3 mL) inpn 28 Units by SubCUTAneous route nightly.  Indications: type 2 diabetes mellitus 28 Units 0    umeclidinium (INCRUSE ELLIPTA) 62.5 mcg/actuation inhaler Take 1 Puff by inhalation daily. Indications: BRONCHOSPASM PREVENTION WITH COPD 1 Inhaler 0    albuterol (PROVENTIL VENTOLIN) 2.5 mg /3 mL (0.083 %) nebulizer solution 3 mL by Nebulization route every four (4) hours as needed for Wheezing. Indications: BRONCHOSPASM PREVENTION, Chronic Obstructive Pulmonary Disease 30 Each 0    omeprazole (PRILOSEC) 40 mg capsule Take 40 mg by mouth daily. Indications: gastroesophageal reflux disease      lisinopril (PRINIVIL, ZESTRIL) 40 mg tablet Take 40 mg by mouth daily. Indications: hypertension      simethicone (MYLICON) 80 mg chewable tablet Take 80 mg by mouth every six (6) hours as needed for Flatulence.  cpap machine kit by Does Not Apply route nightly.  OXYGEN-AIR DELIVERY SYSTEMS 2 L by Nasal route continuous. First Choice      fluticasone-salmeterol (ADVAIR HFA) 230-21 mcg/actuation inhaler Take 2 Puffs by inhalation two (2) times a day.  Diabetic Supplies, [place] [id number]. [facility] Diabetic syringes 1 mL - use one daily 30 Each 5    insulin lispro (HUMALOG) 100 unit/mL injection Check sugars three times a day before meals, if blood sugar is greater than 250 give yourself 8 units of Humalog before eating. Indications: type 2 diabetes mellitus (Patient taking differently: Check sugars three times a day before meals, if blood sugar is less than 100, then give do not give. If Blood sugar is 100-200, use 5 units. If Blood sugar 200-300, Give 8 units of Humalog before eating. If greater than 300, use 12 units. Indications: type 2 diabetes mellitus) 1 Vial 2    aspirin delayed-release 81 mg tablet Take 81 mg by mouth daily.  famotidine (PEPCID) 20 mg tablet Take 20 mg by mouth two (2) times daily as needed.  albuterol (PROVENTIL HFA, VENTOLIN HFA, PROAIR HFA) 90 mcg/actuation inhaler Take 2 Puffs by inhalation every four (4) hours as needed for Wheezing. For the next 2 days after hospital discharge, use your inhaler 4 times a day.  1 Inhaler 0    Inhalational Spacing Device (AEROCHAMBER) 1 Each by Does Not Apply route as needed for Cough or Other (COPD exacerbation). 1 Device 0    fluticasone (FLONASE) 50 mcg/actuation nasal spray 2 Sprays by Both Nostrils route daily.  montelukast (SINGULAIR) 10 mg tablet Take 10 mg by mouth nightly.  insulin lispro 100 unit/mL inph 4 Units by SubCUTAneous route Before breakfast, lunch, and dinner. Indications: type 2 diabetes mellitus, while taking steroids 6 mL 0    Diabetic Supplies, [employer] misc Diabetic needles 1/2 inch 31 gauge - 1 injection daily 30 Each 5    Diabetic Supplies, [employer] misc Diabetic test strips - use four times daily 120 Each 0    Diabetic Supplies, [employer]isc[employer]. misc Diabetic lancets - use three times daily 90 Each 5    ipratropium (ATROVENT) 0.02 % soln 2.5 mL by Nebulization route four (4) times daily as needed. Indications: BRONCHOSPASM PREVENTION WITH COPD 30 Vial 0     No current facility-administered medications on file prior to visit. Allergy:  Allergies   Allergen Reactions    Ancef [Cefazolin] Hives    Contrast Agent [Iodine] Anaphylaxis, Shortness of Breath and Swelling     Needs pre-medication for IV contrast with Benadryl, Solu-Medrol    Fish Containing Products Anaphylaxis     Pt states she had a severe allergic reaction at 9 y/o.  Metformin Other (comments)     Abdominal pain, diarrhea.     Codeine Other (comments)     Altered mental status       Review of Systems  General ROS: positive for  - fatigue and sleep disturbance  negative for - chills, fever, hot flashes, malaise, night sweats, weight gain or weight loss  ENT ROS: negative  Hematological and Lymphatic ROS: negative for - bleeding problems, blood clots, bruising, jaundice, pallor or swollen lymph nodes  Endocrine ROS: negative for - polydipsia/polyuria, skin changes, temperature intolerance or unexpected weight changes  Respiratory ROS: positive for - shortness of breath, tachypnea and wheezing  negative for - cough, hemoptysis, orthopnea, pleuritic pain, sputum changes or stridor  Cardiovascular ROS: positive for - chest pain, dyspnea on exertion and rapid heart rate  negative for - loss of consciousness, murmur or orthopnea  Gastrointestinal ROS: no abdominal pain, change in bowel habits, or black or bloody stools  Genito-Urinary ROS: no dysuria, trouble voiding, or hematuria  Musculoskeletal ROS: positive for - joint stiffness  Neurological ROS: no TIA or stroke symptoms  Dermatological ROS: negative for - pruritus, rash or skin lesion changes   Psychological ROS: negative   Otherwise negative. 2/6/2018 3/13/2018 4/9/2018 5/30/2018   WEIGHT 253 lb 8 oz 252 lb 12.8 oz 254 lb 8 oz 253 lb 9.6 oz           Physical Exam:  Blood pressure 130/76, pulse 81, temperature 97.6 °F (36.4 °C), temperature source Oral, resp. rate 18, height 5' 3\" (1.6 m), weight 115 kg (253 lb 9.6 oz), SpO2 97 %. on 2LPM, Body mass index is 44.92 kg/(m^2). General: in no respiratory distress, acyanotic, appears stated age, cooperative, pleasant  HEENT: PERRL, EOMI, throat without erythema or exudate, Tongue- normal size with some dental indention on tongue, Mallampati's score 3+, Uvula- midline.   Neck: Supple,  no abnormally enlarged lymph nodes, thyroid is not enlarged, non-tender, No JVD  Chest: Increased A-P diameter  Lungs: moderate air entry, clear to auscultation bilaterally,   Heart: Regular rate and rhythm, S1S2 present, without murmur  Abdomen: Obese,  bowel sounds normoactive, abdomen is soft without significant tenderness- does have chronic mild tenderness from ventral hernia- no change in smptoms, or guarding  Extremity: negative for edema, cyanosis or clubbing  Skin: Skin color, texture, turgor normal. No rashes or lesions    Data Reviewed:  CBC: Lab Results   Component Value Date/Time    WBC 14.1 (H) 03/15/2018 03:44 AM    HGB 11.6 (L) 03/15/2018 03:44 AM    HCT 33.7 (L) 03/15/2018 03:44 AM PLATELET 279 04/10/6699 03:44 AM    MCV 90.6 03/15/2018 03:44 AM       BMP: Lab Results   Component Value Date/Time    Sodium 138 03/15/2018 03:44 AM    Potassium 4.0 03/15/2018 03:44 AM    Chloride 103 03/15/2018 03:44 AM    CO2 27 03/15/2018 03:44 AM    Anion gap 8 03/15/2018 03:44 AM    Glucose 183 (H) 03/15/2018 03:44 AM    BUN 28 (H) 03/15/2018 03:44 AM    Creatinine 1.04 03/15/2018 03:44 AM    BUN/Creatinine ratio 27 (H) 03/15/2018 03:44 AM    GFR est AA >60 03/15/2018 03:44 AM    GFR est non-AA 54 (L) 03/15/2018 03:44 AM    Calcium 9.0 03/15/2018 03:44 AM        TSH:  Lab Results   Component Value Date/Time    TSH 2.76 09/18/2016 02:20 PM    TSH 2.94 02/15/2010 11:24 AM       Imaging:  [x]I have personally reviewed the patients radiographs section     Results from Hospital Encounter encounter on 03/13/18   XR CHEST PORT   Narrative EXAM: Chest Radiograph    INDICATION: Respiratory distress    TECHNIQUE: AP view of the chest    COMPARISON: 1/23/2018, 1/12/2018, 8/8/2017 and 3/21/2017    FINDINGS: No pneumothorax identified. Linear opacities are noted in the lung  bases which are chronic and microvascular some scarring. No acute infiltrate  appreciated. No effusions identified. The cardiomediastinal silhouette is  unremarkable. The pulmonary vasculature is unremarkable. Degenerative changes  of the thoracic spine are noted. Impression Impression:  1. Scarring in the lung bases. No acute infiltrate appreciated. Results from Hospital Encounter encounter on 02/21/18   CT CHEST WO CONT   Narrative CT CHEST WITHOUT ENHANCEMENT    INDICATION: Shortness of breath, COPD, smoking history, history of lung nodule  undergoing follow-up. TECHNIQUE: Axial images obtained from the thoracic inlet to the level of the  diaphragm without intravenous contrast. Coronal and sagittal reformatted images  were obtained.     All CT scans at this facility are performed using dose optimization technique as  appropriate to a performed exam, to include automated exposure control,  adjustment of the mA and/or kV according to patient size (including appropriate  matching first site-specific examinations), or use of iterative reconstruction  technique. COMPARISON: 2013. CHEST FINDINGS:   The evaluation of the mediastinum, hilum and vascular structures is limited in  the absence of intravenous contrast.      Thyroid: Unremarkable in its visualized aspects. Pericardium/ Heart: Heart size is normal. No pericardial effusion. Moderate LAD  coronary arteriosclerosis. Aorta/ Vessels: No aneurysm. Mild atherosclerosis. Lymph Nodes: Unremarkable. .    Lungs: Trachea and central bronchial tree are patent. Mild centrilobular  emphysema and central bronchial wall thickening. Similar mild scarring in the  right middle lobe. Pleura: No effusion. Upper Abdomen: There is moderate hepatic steatosis throughout. Suspect  hepatomegaly but the caudal liver is not included in the field-of-view. More  significant abdominal atherosclerosis with coarse calcification at the limited  visualized ostia. .    Bones/soft tissues: Degenerative disc disease. Impression IMPRESSION:    Mild emphysema and bronchitis. Moderate LAD coronary arteriosclerosis. Moderate hepatic steatosis and possible hepatomegaly but caudal liver is not  included in the field-of-view.          Cardiac Echo:     Results for orders placed or performed during the hospital encounter of 13   2D ECHO COMPLETE ADULT (TTE)     Status: None    [address]  [place], [phone]    Transthoracic Echocardiogram    Patient: Inder Salvador  MRN: [id number]  [address]  #: [de-identified]  : 1960  Age: 47 years  Gender: Female  Height: 63 in  Weight: 246.4 lb  BSA: 2.11 m squared  Study date: 19-Aug-2013  BP: 174 / 97 mmHg  Status: Routine  Location: Echo lab  [facility] ACC #: [id number]    Allergies: CODEINE, CEFAZOLIN, IODINE    Technologist:  KERWIN MillerT, RCS  Referring:  Humboldtdiaz Elise. Elvin Grimm MD  Referring:  Renato Rollins MD  Interpreting Group:  [address] [place] [address]  Interpreting Physician:  Leoncio Rossi MD    IMPRESSIONS:  There was no significant valvular heart disease. SUMMARY:  Procedure information: This was a technically difficult study. Left ventricle: Size was normal. Systolic function was normal by EF  (biplane method of disks). Ejection fraction was estimated to be 60 %. No  obvious wall motion abnormalities identified in the views obtained. There  was mild concentric hypertrophy. INDICATIONS: Edema, Shortness of breath. HISTORY: Prior history: Risk factors: hypertension, oral  hypoglycemic-treated diabetes, medication-treated hypercholesterolemia,  morbid obesity, and a history of cigarette use (quitting 7 years ago). Chronic lung disease. PROCEDURE: This was a routine study. The study included complete 2D  imaging, M-mode, complete spectral Doppler, and color Doppler. Systolic  blood pressure was 174 mmHg, at the start of the study. Diastolic blood  pressure was 97 mmHg, at the start of the study. This was a technically  difficult study. LEFT VENTRICLE: Size was normal. Systolic function was normal by EF  (biplane method of disks). Ejection fraction was estimated to be 60 %. No  obvious wall motion abnormalities identified in the views obtained. There  was mild concentric hypertrophy. DOPPLER: Indeterminate diastolic function. RIGHT VENTRICLE: The size was normal. Systolic function was normal.  DOPPLER: The tricuspid jet envelope definition was inadequate for  estimation of RV systolic pressure. There are no indirect findings  (abnormal RV volume or geometry, altered pulmonary flow velocity profile,  or leftward septal displacement) which would suggest moderate or severe  pulmonary hypertension. LEFT ATRIUM: Size was normal. LA volume index was 19 ml/m squared.     RIGHT ATRIUM: Size was normal.    MITRAL VALVE: Normal valve structure. There was normal leaflet separation. DOPPLER: The transmitral velocity was within the normal range. There was  no evidence for stenosis. There was trivial regurgitation. AORTIC VALVE: The valve was probably trileaflet. Leaflets exhibited normal  cuspal separation and good mobility. DOPPLER: There was no stenosis. There  was no regurgitation. TRICUSPID VALVE: Normal valve structure. There was normal leaflet  separation. DOPPLER: The transtricuspid velocity was within the normal  range. There was no evidence for tricuspid stenosis. There was trivial  regurgitation. PULMONIC VALVE: Not well visualized, but normal Doppler findings. DOPPLER:  There was no stenosis. AORTA: The root exhibited normal size. SYSTEMIC VEINS: IVC: The inferior vena cava was not well visualized. PERICARDIUM: There was no pericardial effusion. A pericardial fat pad was  present. MEASUREMENT TABLES    Doppler measurements  Tricuspid valve   (Reference normals)  Regurg peak moe   202 cm/s   (--)  RV-RA peak gradient   16 mmHg   (--)    SYSTEM MEASUREMENT TABLES    2D  Ao Diam: 2.24 cm  LA Diam: 3.23 cm  %FS: 34.79 %  EF(Teich): 64.51 %  IVSd: 1.44 cm  LVIDd: 4.05 cm  LVIDs: 2.64 cm  LVPWd: 1.29 cm  SV(Teich): 46.61 ml  EF Biplane: 67.59 %  LVEDV MOD BP: 60.02 ml  LVESV MOD BP: 19.45 ml  LVOT Diam: 1.93 cm  RVIDd: 3.02 cm    CW  AV Vmax: 1.39 m/s  AV maxP.76 mmHG  TR Vmax: 2.02 m/s  AV Vmean: 1.08 m/s    PW  LVOT VTI: 22.77 cm  LVOT Vmax: 1.33 m/s  LVOT Vmean: 0.72 m/s  MV A Ome: 0.01 m/s  MV E Moe: 0.55 m/s  MV E/A Ratio: 48.33  TED (VTI): 2.57 cm2    Prepared and E-signed by    Lan Rossi MD  Signed 19-Aug-2013 17:23:16            Historical Sleep Testing Data:  - PSG: EVMS:  16:  AHI:12, SpO2 divine 69%- Mean 90%, time below 89%: 30min        Stann Castleman, DO, FCCP  Pulmonary, Sleep and Critical Care Medicine

## 2018-05-30 NOTE — PROGRESS NOTES
Ms. Cloud Forward has a reminder for a \"due or due soon\" health maintenance. I have asked that she contact her primary care provider for follow-up on this health maintenance.   Chief Complaint   Patient presents with    Sleep Apnea

## 2018-06-02 ENCOUNTER — APPOINTMENT (OUTPATIENT)
Dept: GENERAL RADIOLOGY | Age: 58
End: 2018-06-02
Attending: EMERGENCY MEDICINE
Payer: MEDICARE

## 2018-06-02 ENCOUNTER — HOSPITAL ENCOUNTER (EMERGENCY)
Age: 58
Discharge: HOME | End: 2018-06-03
Attending: EMERGENCY MEDICINE
Payer: MEDICARE

## 2018-06-02 DIAGNOSIS — J44.1 COPD EXACERBATION (HCC): Primary | ICD-10-CM

## 2018-06-02 LAB
ALBUMIN SERPL-MCNC: 3.7 G/DL (ref 3.4–5)
ALBUMIN/GLOB SERPL: 0.9 {RATIO} (ref 0.8–1.7)
ALP SERPL-CCNC: 86 U/L (ref 45–117)
ALT SERPL-CCNC: 41 U/L (ref 13–56)
ANION GAP SERPL CALC-SCNC: 8 MMOL/L (ref 3–18)
AST SERPL-CCNC: 23 U/L (ref 15–37)
BASOPHILS # BLD: 0 K/UL (ref 0–0.1)
BASOPHILS NFR BLD: 0 % (ref 0–2)
BILIRUB SERPL-MCNC: 0.9 MG/DL (ref 0.2–1)
BUN SERPL-MCNC: 11 MG/DL (ref 7–18)
BUN/CREAT SERPL: 12 (ref 12–20)
CALCIUM SERPL-MCNC: 9.1 MG/DL (ref 8.5–10.1)
CHLORIDE SERPL-SCNC: 101 MMOL/L (ref 100–108)
CK MB CFR SERPL CALC: 1 % (ref 0–4)
CK MB SERPL-MCNC: 1.2 NG/ML (ref 5–25)
CK SERPL-CCNC: 118 U/L (ref 26–192)
CO2 SERPL-SCNC: 28 MMOL/L (ref 21–32)
CREAT SERPL-MCNC: 0.93 MG/DL (ref 0.6–1.3)
DIFFERENTIAL METHOD BLD: ABNORMAL
EOSINOPHIL # BLD: 0 K/UL (ref 0–0.4)
EOSINOPHIL NFR BLD: 1 % (ref 0–5)
ERYTHROCYTE [DISTWIDTH] IN BLOOD BY AUTOMATED COUNT: 13.1 % (ref 11.6–14.5)
GLOBULIN SER CALC-MCNC: 3.9 G/DL (ref 2–4)
GLUCOSE SERPL-MCNC: 185 MG/DL (ref 74–99)
HCT VFR BLD AUTO: 38.4 % (ref 35–45)
HGB BLD-MCNC: 13.5 G/DL (ref 12–16)
INR PPP: 1.1 (ref 0.8–1.2)
LYMPHOCYTES # BLD: 1.3 K/UL (ref 0.9–3.6)
LYMPHOCYTES NFR BLD: 20 % (ref 21–52)
MCH RBC QN AUTO: 29.7 PG (ref 24–34)
MCHC RBC AUTO-ENTMCNC: 35.2 G/DL (ref 31–37)
MCV RBC AUTO: 84.6 FL (ref 74–97)
MONOCYTES # BLD: 0.3 K/UL (ref 0.05–1.2)
MONOCYTES NFR BLD: 4 % (ref 3–10)
NEUTS SEG # BLD: 4.9 K/UL (ref 1.8–8)
NEUTS SEG NFR BLD: 75 % (ref 40–73)
PLATELET # BLD AUTO: 263 K/UL (ref 135–420)
PMV BLD AUTO: 9 FL (ref 9.2–11.8)
POTASSIUM SERPL-SCNC: 3.7 MMOL/L (ref 3.5–5.5)
PROT SERPL-MCNC: 7.6 G/DL (ref 6.4–8.2)
PROTHROMBIN TIME: 13.5 SEC (ref 11.5–15.2)
RBC # BLD AUTO: 4.54 M/UL (ref 4.2–5.3)
SODIUM SERPL-SCNC: 137 MMOL/L (ref 136–145)
TROPONIN I SERPL-MCNC: <0.02 NG/ML (ref 0–0.04)
WBC # BLD AUTO: 6.5 K/UL (ref 4.6–13.2)

## 2018-06-02 PROCEDURE — 82553 CREATINE MB FRACTION: CPT

## 2018-06-02 PROCEDURE — 71046 X-RAY EXAM CHEST 2 VIEWS: CPT

## 2018-06-02 PROCEDURE — 80053 COMPREHEN METABOLIC PANEL: CPT

## 2018-06-02 PROCEDURE — 74011000250 HC RX REV CODE- 250: Performed by: EMERGENCY MEDICINE

## 2018-06-02 PROCEDURE — 77030029684 HC NEB SM VOL KT MONA -A

## 2018-06-02 PROCEDURE — 94640 AIRWAY INHALATION TREATMENT: CPT

## 2018-06-02 PROCEDURE — 85610 PROTHROMBIN TIME: CPT

## 2018-06-02 PROCEDURE — 85025 COMPLETE CBC W/AUTO DIFF WBC: CPT

## 2018-06-02 PROCEDURE — 99285 EMERGENCY DEPT VISIT HI MDM: CPT

## 2018-06-02 PROCEDURE — 93005 ELECTROCARDIOGRAM TRACING: CPT

## 2018-06-02 RX ORDER — IPRATROPIUM BROMIDE AND ALBUTEROL SULFATE 2.5; .5 MG/3ML; MG/3ML
3 SOLUTION RESPIRATORY (INHALATION)
Status: COMPLETED | OUTPATIENT
Start: 2018-06-02 | End: 2018-06-02

## 2018-06-02 RX ORDER — LEVOFLOXACIN 500 MG/1
500 TABLET, FILM COATED ORAL
Status: COMPLETED | OUTPATIENT
Start: 2018-06-02 | End: 2018-06-03

## 2018-06-02 RX ORDER — PREDNISONE 20 MG/1
60 TABLET ORAL DAILY
Qty: 9 TAB | Refills: 0 | Status: SHIPPED | OUTPATIENT
Start: 2018-06-02 | End: 2018-06-05

## 2018-06-02 RX ORDER — LEVOFLOXACIN 500 MG/1
500 TABLET, FILM COATED ORAL DAILY
Qty: 6 TAB | Refills: 0 | Status: SHIPPED | OUTPATIENT
Start: 2018-06-02 | End: 2018-06-08 | Stop reason: ALTCHOICE

## 2018-06-02 RX ADMIN — IPRATROPIUM BROMIDE AND ALBUTEROL SULFATE 3 ML: .5; 3 SOLUTION RESPIRATORY (INHALATION) at 20:50

## 2018-06-02 NOTE — ED TRIAGE NOTES
Pt presents via EMS for SOB x 2h. Pt was visiting daughter when she began having worsening dyspnea and WOB. Pt has baseline 2lpm NC req't outside hospital and had not been able to use it since this am while traveling. On arrival pt in minimal distress with RA SpO2 at 94%. A/O x 3.  VS stable.

## 2018-06-03 VITALS
BODY MASS INDEX: 42.34 KG/M2 | HEIGHT: 64 IN | OXYGEN SATURATION: 94 % | HEART RATE: 84 BPM | SYSTOLIC BLOOD PRESSURE: 145 MMHG | WEIGHT: 248 LBS | RESPIRATION RATE: 26 BRPM | TEMPERATURE: 97.7 F | DIASTOLIC BLOOD PRESSURE: 74 MMHG

## 2018-06-03 LAB
ATRIAL RATE: 92 BPM
CALCULATED P AXIS, ECG09: 45 DEGREES
CALCULATED R AXIS, ECG10: 14 DEGREES
CALCULATED T AXIS, ECG11: 42 DEGREES
DIAGNOSIS, 93000: NORMAL
P-R INTERVAL, ECG05: 154 MS
Q-T INTERVAL, ECG07: 368 MS
QRS DURATION, ECG06: 86 MS
QTC CALCULATION (BEZET), ECG08: 455 MS
VENTRICULAR RATE, ECG03: 92 BPM

## 2018-06-03 PROCEDURE — 74011250637 HC RX REV CODE- 250/637: Performed by: EMERGENCY MEDICINE

## 2018-06-03 RX ADMIN — LEVOFLOXACIN 500 MG: 500 TABLET, FILM COATED ORAL at 00:31

## 2018-06-03 NOTE — ED NOTES
I have reviewed discharge instructions with the patient. The patient verbalized understanding. Discharge medications reviewed with patient and appropriate educational materials and side effects teaching were provided. Discussed with the patient and all questioned fully answered.  Patient armband removed and shredded

## 2018-06-03 NOTE — DISCHARGE INSTRUCTIONS
Chronic Obstructive Pulmonary Disease (COPD) Flare-Ups: Care Instructions  Your Care Instructions    Chronic obstructive pulmonary disease (COPD) is a lung disease that makes it hard to breathe. It is caused by damage to the lungs over many years, usually from smoking. COPD is often a mix of two diseases:  · Chronic bronchitis: The airways that carry air to the lungs (bronchial tubes) get inflamed and make a lot of mucus. This can narrow or block the airways. · Emphysema: In a healthy person, the tiny air sacs in the lungs are like balloons. As you breathe in and out, they get bigger and smaller to move air through your lungs. But with emphysema, these air sacs are damaged and lose their stretch. Less air gets in and out of the lungs. Many people with COPD have attacks called flare-ups or exacerbations. This is when your usual symptoms quickly get worse and stay worse. The doctor has checked you carefully. But problems can develop later. If you notice any problems or new symptoms, get medical treatment right away. Follow-up care is a key part of your treatment and safety. Be sure to make and go to all appointments, and call your doctor if you are having problems. It's also a good idea to know your test results and keep a list of the medicines you take. How can you care for yourself at home? · Be safe with medicines. Take your medicines exactly as prescribed. Call your doctor if you think you are having a problem with your medicine. You may be taking medicines such as:  ¨ Bronchodilators. These help open your airways and make breathing easier. ¨ Corticosteroids. These reduce airway inflammation. They may be given as pills, in a vein, or in an inhaled form. You may go home with pills in addition to an inhaler that you already use. · A spacer may help you get more inhaled medicine to your lungs. Ask your doctor or pharmacist if a spacer is right for you. If it is, ask how to use it properly.   · If your doctor prescribed antibiotics, take them as directed. Do not stop taking them just because you feel better. You need to take the full course of antibiotics. · If your doctor prescribed oxygen, use the flow rate your doctor has recommended. Do not change it without talking to your doctor first.  · Do not smoke. Smoking makes COPD worse. If you need help quitting, talk to your doctor about stop-smoking programs and medicines. These can increase your chances of quitting for good. When should you call for help? Call 911 anytime you think you may need emergency care. For example, call if:  ? · You have severe trouble breathing. ?Call your doctor now or seek immediate medical care if:  ? · You have new or worse trouble breathing. ? · Your coughing or wheezing gets worse. ? · You cough up dark brown or bloody mucus (sputum). ? · You have a new or higher fever. ? Watch closely for changes in your health, and be sure to contact your doctor if:  ? · You notice more mucus or a change in the color of your mucus. ? · You need to use your antibiotic or steroid pills. ? · You do not get better as expected. Where can you learn more? Go to http://etelvina-olivier.info/. Enter V863 in the search box to learn more about \"Chronic Obstructive Pulmonary Disease (COPD) Flare-Ups: Care Instructions. \"  Current as of: May 12, 2017  Content Version: 11.4  © 4183-4825 [employer]. Care instructions adapted under license by [employer] (which disclaims liability or warranty for this information). If you have questions about a medical condition or this instruction, always ask your healthcare professional. [address] any warranty or liability for your use of this information.

## 2018-06-03 NOTE — ED PROVIDER NOTES
EMERGENCY DEPARTMENT HISTORY AND PHYSICAL EXAM    8:37 PM      Date: 6/2/2018  Patient Name: Farzaneh Tineo    History of Presenting Illness     Chief Complaint   Patient presents with    Shortness of Breath         History Provided By: Patient    Chief Complaint: SOB  Duration: 1 Hours  Timing:  Constant  Location: Respiratory   Modifying Factors: EMS administered mag, solu-medrol, and 2 breathing Tx's with improvement   Associated Symptoms: dry cough    Additional History (Context):     Farzaneh Tineo is a 61 y.o. female with a pertinent history of COPD, asthma, DM, HTN, HLD, GERD, presenting to the ED via EMS c/o constant, SOB starting 1 hr PTA while she was walking. States she generally did not \"feel good\" all day today, notes the heat and humidity have been \"oppressive. \" Also reports dry cough starting today. No fever. Reports that EMS administered mag, solu-medrol, and 2 breathing Tx's with improvement. Notes she is normally on 2 L O2 via NC at home. No cardiac Hx. Notes she quit smoking tobacco use 15 years ago. No other acute symptoms or complaints were noted. PCP: Evaristo Lepe MD    Current Outpatient Prescriptions   Medication Sig Dispense Refill    predniSONE (DELTASONE) 20 mg tablet Take 3 Tabs by mouth daily for 3 days. With Breakfast 9 Tab 0    levoFLOXacin (LEVAQUIN) 500 mg tablet Take 1 Tab by mouth daily for 6 days. Indications: COPD exacerbation 6 Tab 0    insulin glargine (LANTUS,BASAGLAR) 100 unit/mL (3 mL) inpn 28 Units by SubCUTAneous route nightly. Indications: type 2 diabetes mellitus 28 Units 0    umeclidinium (INCRUSE ELLIPTA) 62.5 mcg/actuation inhaler Take 1 Puff by inhalation daily. Indications: BRONCHOSPASM PREVENTION WITH COPD 1 Inhaler 0    insulin lispro 100 unit/mL inph 4 Units by SubCUTAneous route Before breakfast, lunch, and dinner. Indications: type 2 diabetes mellitus, while taking steroids 6 mL 0    Diabetic Supplies, Miscellan.  misc Diabetic needles 1/2 inch 31 gauge - 1 injection daily 30 Each 5    Diabetic Supplies, Miscellan. misc Diabetic test strips - use four times daily 120 Each 0    Diabetic Supplies, Miscellan. misc Diabetic lancets - use three times daily 90 Each 5    ipratropium (ATROVENT) 0.02 % soln 2.5 mL by Nebulization route four (4) times daily as needed. Indications: BRONCHOSPASM PREVENTION WITH COPD 30 Vial 0    albuterol (PROVENTIL VENTOLIN) 2.5 mg /3 mL (0.083 %) nebulizer solution 3 mL by Nebulization route every four (4) hours as needed for Wheezing. Indications: BRONCHOSPASM PREVENTION, Chronic Obstructive Pulmonary Disease 30 Each 0    omeprazole (PRILOSEC) 40 mg capsule Take 40 mg by mouth daily. Indications: gastroesophageal reflux disease      lisinopril (PRINIVIL, ZESTRIL) 40 mg tablet Take 40 mg by mouth daily. Indications: hypertension      simethicone (MYLICON) 80 mg chewable tablet Take 80 mg by mouth every six (6) hours as needed for Flatulence.  cpap machine kit by Does Not Apply route nightly.  OXYGEN-AIR DELIVERY SYSTEMS 2 L by Nasal route continuous. First Choice      fluticasone-salmeterol (ADVAIR HFA) 230-21 mcg/actuation inhaler Take 2 Puffs by inhalation two (2) times a day.  Diabetic Supplies, [address] [facility] [place] Diabetic syringes 1 mL - use one daily 30 Each 5    insulin lispro (HUMALOG) 100 unit/mL injection Check sugars three times a day before meals, if blood sugar is greater than 250 give yourself 8 units of Humalog before eating. Indications: type 2 diabetes mellitus (Patient taking differently: Check sugars three times a day before meals, if blood sugar is less than 100, then give do not give. If Blood sugar is 100-200, use 5 units. If Blood sugar 200-300, Give 8 units of Humalog before eating. If greater than 300, use 12 units. Indications: type 2 diabetes mellitus) 1 Vial 2    aspirin delayed-release 81 mg tablet Take 81 mg by mouth daily.       famotidine (PEPCID) 20 mg tablet Take 20 mg by mouth two (2) times daily as needed.  albuterol (PROVENTIL HFA, VENTOLIN HFA, PROAIR HFA) 90 mcg/actuation inhaler Take 2 Puffs by inhalation every four (4) hours as needed for Wheezing. For the next 2 days after hospital discharge, use your inhaler 4 times a day. 1 Inhaler 0    Inhalational Spacing Device (AEROCHAMBER) 1 Each by Does Not Apply route as needed for Cough or Other (COPD exacerbation). 1 Device 0    fluticasone (FLONASE) 50 mcg/actuation nasal spray 2 Sprays by Both Nostrils route daily.  montelukast (SINGULAIR) 10 mg tablet Take 10 mg by mouth nightly.          Past History     Past Medical History:  Past Medical History:   Diagnosis Date    Asthma     COPD     Cystocele, midline     Diabetes mellitus ([facility] [address]     GERD (gastroesophageal reflux disease)     Hidradenitis suppurativa     Hyperlipidemia     Hypertension     SHERRIE on CPAP     CPAP    Stress incontinence        Past Surgical History:  Past Surgical History:   Procedure Laterality Date    BREAST SURGERY PROCEDURE UNLISTED      Right breast benign tumor removal    HX APPENDECTOMY      HX CHOLECYSTECTOMY      HX DILATION AND CURETTAGE      Dysfunctional uterine bleeding, thought 2/2 fibroids    HX TUBAL LIGATION         Family History:  Family History   Problem Relation Age of Onset    Hypertension Mother     Stroke Mother     Breast Cancer Mother      Bilateral mastectomies    Cancer Mother      ovarian and breast    Heart Failure Mother     Heart Attack Father      2011    Heart Surgery Father      CABG    Heart Failure Father     COPD Sister      Heavy smoker    Cancer Sister      ovarian    Heart Failure Sister     Lung Disease Sister     Asthma Child     Cancer Maternal Aunt      pancreatic    Cancer Maternal Grandfather      stomach       Social History:  Social History   Substance Use Topics    Smoking status: Former Smoker     Packs/day: 1.00     Years: 30.00     Types: Cigarettes     Start date: 5/6/1966     Quit date: 9/6/2006    Smokeless tobacco: Never Used      Comment: 1-1.5 packs per day    Alcohol use No       Allergies: Allergies   Allergen Reactions    Ancef [Cefazolin] Hives    Contrast Agent [Iodine] Anaphylaxis, Shortness of Breath and Swelling     Needs pre-medication for IV contrast with Benadryl, Solu-Medrol    Fish Containing Products Anaphylaxis     Pt states she had a severe allergic reaction at 9 y/o.  Metformin Other (comments)     Abdominal pain, diarrhea.  Codeine Other (comments)     Altered mental status         Review of Systems       Review of Systems   Constitutional: Negative for chills, fatigue and fever. HENT: Negative for congestion, rhinorrhea and sore throat. Eyes: Negative for visual disturbance. Respiratory: Positive for cough (dry) and shortness of breath. Negative for wheezing. Cardiovascular: Negative for chest pain, palpitations and leg swelling. Gastrointestinal: Negative for abdominal pain, diarrhea, nausea and vomiting. Genitourinary: Negative for difficulty urinating and dysuria. Musculoskeletal: Negative for arthralgias. Skin: Negative for rash. Neurological: Negative for weakness and headaches. All other systems reviewed and are negative. Physical Exam     Visit Vitals    /83 (BP 1 Location: Left arm)    Pulse 99    Temp 97.7 °F (36.5 °C)    Resp 20    Ht 5' 4\" (1.626 m)    Wt 112.5 kg (248 lb)    SpO2 94%    BMI 42.57 kg/m2         Physical Exam   Constitutional: She is oriented to person, place, and time. She appears well-developed and well-nourished. No distress. HENT:   Head: Normocephalic and atraumatic. Mouth/Throat: Oropharynx is clear and moist and mucous membranes are normal. No oropharyngeal exudate. Eyes: Conjunctivae and EOM are normal. No scleral icterus. Neck: Normal range of motion. Neck supple. No JVD present. No thyromegaly present.    Cardiovascular: Normal rate, regular rhythm, S1 normal, S2 normal, normal heart sounds and intact distal pulses. Exam reveals no gallop and no friction rub. No murmur heard. Pulmonary/Chest: Breath sounds normal. No accessory muscle usage. No respiratory distress. She has no wheezes. She has no rhonchi. She has no rales. She exhibits no tenderness. Lungs are in long expiratory phase. No wheezes, rales, or crackles. Abdominal: Soft. Normal appearance and bowel sounds are normal. She exhibits no distension and no pulsatile midline mass. There is no tenderness. There is no rebound and no guarding. Musculoskeletal: Normal range of motion. She exhibits no edema. Lymphadenopathy:        Head (right side): No submandibular adenopathy present. She has no cervical adenopathy. Neurological: She is alert and oriented to person, place, and time. Moving all extremities. No obvious deficits or facial asymmetry. Skin: Skin is warm, dry and intact. No rash noted. No erythema. Psychiatric: She has a normal mood and affect. Her speech is normal and behavior is normal.   Nursing note and vitals reviewed.         Diagnostic Study Results     Labs -  Recent Results (from the past 12 hour(s))   EKG, 12 LEAD, INITIAL    Collection Time: 06/02/18  8:50 PM   Result Value Ref Range    Ventricular Rate 92 BPM    Atrial Rate 92 BPM    P-R Interval 154 ms    QRS Duration 86 ms    Q-T Interval 368 ms    QTC Calculation (Bezet) 455 ms    Calculated P Axis 45 degrees    Calculated R Axis 14 degrees    Calculated T Axis 42 degrees    Diagnosis       Normal sinus rhythm  Normal ECG  When compared with ECG of 13-MAR-2018 10:27,  No significant change was found     CBC WITH AUTOMATED DIFF    Collection Time: 06/02/18  9:00 PM   Result Value Ref Range    WBC 6.5 4.6 - 13.2 K/uL    RBC 4.54 4.20 - 5.30 M/uL    HGB 13.5 12.0 - 16.0 g/dL    HCT 38.4 35.0 - 45.0 %    MCV 84.6 74.0 - 97.0 FL    MCH 29.7 24.0 - 34.0 PG    MCHC 35.2 31.0 - 37.0 g/dL    RDW 13.1 11.6 - 14.5 %    PLATELET 331 570 - 117 K/uL    MPV 9.0 (L) 9.2 - 11.8 FL    NEUTROPHILS 75 (H) 40 - 73 %    LYMPHOCYTES 20 (L) 21 - 52 %    MONOCYTES 4 3 - 10 %    EOSINOPHILS 1 0 - 5 %    BASOPHILS 0 0 - 2 %    ABS. NEUTROPHILS 4.9 1.8 - 8.0 K/UL    ABS. LYMPHOCYTES 1.3 0.9 - 3.6 K/UL    ABS. MONOCYTES 0.3 0.05 - 1.2 K/UL    ABS. EOSINOPHILS 0.0 0.0 - 0.4 K/UL    ABS. BASOPHILS 0.0 0.0 - 0.1 K/UL    DF AUTOMATED     PROTHROMBIN TIME + INR    Collection Time: 06/02/18  9:00 PM   Result Value Ref Range    Prothrombin time 13.5 11.5 - 15.2 sec    INR 1.1 0.8 - 1.2     METABOLIC PANEL, COMPREHENSIVE    Collection Time: 06/02/18  9:00 PM   Result Value Ref Range    Sodium 137 136 - 145 mmol/L    Potassium 3.7 3.5 - 5.5 mmol/L    Chloride 101 100 - 108 mmol/L    CO2 28 21 - 32 mmol/L    Anion gap 8 3.0 - 18 mmol/L    Glucose 185 (H) 74 - 99 mg/dL    BUN 11 7.0 - 18 MG/DL    Creatinine 0.93 0.6 - 1.3 MG/DL    BUN/Creatinine ratio 12 12 - 20      GFR est AA >60 >60 ml/min/1.73m2    GFR est non-AA >60 >60 ml/min/1.73m2    Calcium 9.1 8.5 - 10.1 MG/DL    Bilirubin, total 0.9 0.2 - 1.0 MG/DL    ALT (SGPT) 41 13 - 56 U/L    AST (SGOT) 23 15 - 37 U/L    Alk. phosphatase 86 45 - 117 U/L    Protein, total 7.6 6.4 - 8.2 g/dL    Albumin 3.7 3.4 - 5.0 g/dL    Globulin 3.9 2.0 - 4.0 g/dL    A-G Ratio 0.9 0.8 - 1.7     CARDIAC PANEL,(CK, CKMB & TROPONIN)    Collection Time: 06/02/18  9:00 PM   Result Value Ref Range     26 - 192 U/L    CK - MB 1.2 <3.6 ng/ml    CK-MB Index 1.0 0.0 - 4.0 %    Troponin-I, Qt. <0.02 0.0 - 0.045 NG/ML       Radiologic Studies -   Xr Chest Pa Lat    Result Date: 6/2/2018  TWO VIEW CHEST RADIOGRAPH CPT CODE: 98421 INDICATION: Shortness of breath, increased work of breathing. COMPARISON: 3/13/2018 FINDINGS: Unchanged cardiomediastinal silhouette. Atherosclerosis at the arch. Similar regions of subsegmental atelectasis or scarring at the lung bases. No focal opacity otherwise or pneumothorax.  No pleural effusion. IMPRESSION: No significant interval change. Medical Decision Making   I am the first provider for this patient. I reviewed the vital signs, available nursing notes, past medical history, past surgical history, family history and social history. Vital Signs-Reviewed the patient's vital signs. Pulse Oximetry Analysis -  94% on room air (Interpretation)    Cardiac Monitor:  Rate: 91  Rhythm:  Normal Sinus Rhythm     EKG: Interpreted by the EP. Time Interpreted: 20:50   Rate: 92   Rhythm: Normal Sinus Rhythm    Interpretation: Normal axis. Normal intervals. No obvious ST changes. Q-wave in lead 3 and aVF. Comparison: No change form previous EKG done on March 13, 2018      Records Reviewed: Nursing Notes and Old Medical Records (Time of Review: 8:37 PM)      Provider Notes (Medical Decision Making):   ASSESSMENT / PLAN:      58y/o  woman, PMhx of morbid obesity, COPD (on home O2 2lnc), SHERRIE (on CPAP), HTN, DM (insulin) who presents with ~2dys of generally 'not feeling well' and 1dy of increased SOB and CHAUDHARY. Some nonproductive cough as well. No f/c. EMS brought to ED, gave solumedrol, duoneb and Magnesium. Reports feeling somewhat better. On exam, obese, sitting up in bed, NAD. Pleasant. HEENT, CV, abd benign. Vitals normal on home 2l NC. Lungs w/some decreased sounds thfoughout and long expiratory phase. No wheezes or crackles. Some increased rate ~24 when speaking. Speaking full sentences but seems a bit winded. No LE edema. Seems most likely COPD exacerbation. Could be pneumonia, ACS, effusion, pulm edema. No PMHx of CHF but possible as well.    -CXR  -EKG  -CBC, CMP  -Card Enz x 1  -Duoneb x 1 (got 2 in field)  -Got Solumedrol in field and Chanel Polo MD  EM-IM Physician      ED Course: Progress Notes, Reevaluation, and Consults:  UPDATE 11:25 PM  -Labs benign  -CXR clear other than mild band like atelectasis RLL  -Pt reports feeling better  -Will tx emperically as COPD exacerbation    -Rx Levoflox 500mg PO daily x 7dys  -Rx Prednisone 60mg daily for 3dy burst  -Continue nebs prn at home  -Pt will erica her PCP tomorrow morning to discuss any changes in her Insulin. She reports her BS usually goes up to ~400 with prednisone but her PCP is comfortabel making adjustments. She has done thsi before in past.   -DC home      Aric Eldridge MD  EM-IM Physician         Diagnosis     Clinical Impression:   1. COPD exacerbation ([address]        Disposition: Discharge     Follow-up Information     Follow up With Details Comments Rajeev Johnson MD Call in 1 day  [address]      [facility] EMERGENCY DEPT  As needed, If symptoms worsen [address]  [phone]           Patient's Medications   Start Taking    LEVOFLOXACIN (LEVAQUIN) 500 MG TABLET    Take 1 Tab by mouth daily for 6 days. Indications: COPD exacerbation    PREDNISONE (DELTASONE) 20 MG TABLET    Take 3 Tabs by mouth daily for 3 days. With Breakfast   Continue Taking    ALBUTEROL (PROVENTIL HFA, VENTOLIN HFA, PROAIR HFA) 90 MCG/ACTUATION INHALER    Take 2 Puffs by inhalation every four (4) hours as needed for Wheezing. For the next 2 days after hospital discharge, use your inhaler 4 times a day. ALBUTEROL (PROVENTIL VENTOLIN) 2.5 MG /3 ML (0.083 %) NEBULIZER SOLUTION    3 mL by Nebulization route every four (4) hours as needed for Wheezing. Indications: BRONCHOSPASM PREVENTION, Chronic Obstructive Pulmonary Disease    ASPIRIN DELAYED-RELEASE 81 MG TABLET    Take 81 mg by mouth daily. CPAP MACHINE KIT    by Does Not Apply route nightly. DIABETIC SUPPLIES, MISCELLAN. MISC    Diabetic syringes 1 mL - use one daily    DIABETIC SUPPLIES, MISCELLAN. MISC    Diabetic needles 1/2 inch 31 gauge - 1 injection daily    DIABETIC SUPPLIES, MISCELLAN.  MISC    Diabetic test strips - use four times daily    DIABETIC Nel Mcghee. MISC    Diabetic lancets - use three times daily    FAMOTIDINE (PEPCID) 20 MG TABLET    Take 20 mg by mouth two (2) times daily as needed. FLUTICASONE (FLONASE) 50 MCG/ACTUATION NASAL SPRAY    2 Sprays by Both Nostrils route daily. FLUTICASONE-SALMETEROL (ADVAIR HFA) 230-21 MCG/ACTUATION INHALER    Take 2 Puffs by inhalation two (2) times a day. INHALATIONAL SPACING DEVICE (AEROCHAMBER)    1 Each by Does Not Apply route as needed for Cough or Other (COPD exacerbation). INSULIN GLARGINE (LANTUS,BASAGLAR) 100 UNIT/ML (3 ML) INPN    28 Units by SubCUTAneous route nightly. Indications: type 2 diabetes mellitus    INSULIN LISPRO (HUMALOG) 100 UNIT/ML INJECTION    Check sugars three times a day before meals, if blood sugar is greater than 250 give yourself 8 units of Humalog before eating. Indications: type 2 diabetes mellitus    INSULIN LISPRO 100 UNIT/ML INPH    4 Units by SubCUTAneous route Before breakfast, lunch, and dinner. Indications: type 2 diabetes mellitus, while taking steroids    IPRATROPIUM (ATROVENT) 0.02 % SOLN    2.5 mL by Nebulization route four (4) times daily as needed. Indications: BRONCHOSPASM PREVENTION WITH COPD    LISINOPRIL (PRINIVIL, ZESTRIL) 40 MG TABLET    Take 40 mg by mouth daily. Indications: hypertension    MONTELUKAST (SINGULAIR) 10 MG TABLET    Take 10 mg by mouth nightly. OMEPRAZOLE (PRILOSEC) 40 MG CAPSULE    Take 40 mg by mouth daily. Indications: gastroesophageal reflux disease    OXYGEN-AIR DELIVERY SYSTEMS    2 L by Nasal route continuous. First Choice    SIMETHICONE (MYLICON) 80 MG CHEWABLE TABLET    Take 80 mg by mouth every six (6) hours as needed for Flatulence. UMECLIDINIUM (INCRUSE ELLIPTA) 62.5 MCG/ACTUATION INHALER    Take 1 Puff by inhalation daily.  Indications: BRONCHOSPASM PREVENTION WITH COPD   These Medications have changed    No medications on file   Stop Taking    No medications on file _______________________________    Attestations:  Scribe Attestation     Souleymane Ramirez acting as a scribe for and in the presence of James Haney MD   June 02, 2018 at 8:37 PM       Provider Attestation:      I personally performed the services described in the documentation, reviewed the documentation, as recorded by the scribe in my presence, and it accurately and completely records my words and actions.  June 02, 2018 at 8:37 PM - James Haney MD    _______________________________

## 2018-06-08 ENCOUNTER — OFFICE VISIT (OUTPATIENT)
Dept: CARDIOLOGY CLINIC | Age: 58
End: 2018-06-08

## 2018-06-08 VITALS
DIASTOLIC BLOOD PRESSURE: 73 MMHG | BODY MASS INDEX: 42.85 KG/M2 | WEIGHT: 251 LBS | HEIGHT: 64 IN | HEART RATE: 79 BPM | SYSTOLIC BLOOD PRESSURE: 139 MMHG

## 2018-06-08 DIAGNOSIS — E78.5 HYPERLIPIDEMIA, UNSPECIFIED HYPERLIPIDEMIA TYPE: Chronic | ICD-10-CM

## 2018-06-08 DIAGNOSIS — G47.33 OSA ON CPAP: Chronic | ICD-10-CM

## 2018-06-08 DIAGNOSIS — R01.1 CARDIAC MURMUR: ICD-10-CM

## 2018-06-08 DIAGNOSIS — R00.2 PALPITATIONS: Primary | ICD-10-CM

## 2018-06-08 DIAGNOSIS — Z99.89 OSA ON CPAP: Chronic | ICD-10-CM

## 2018-06-08 DIAGNOSIS — I50.32 DIASTOLIC CHF, CHRONIC (HCC): ICD-10-CM

## 2018-06-08 DIAGNOSIS — I10 ESSENTIAL HYPERTENSION, BENIGN: Chronic | ICD-10-CM

## 2018-06-08 DIAGNOSIS — J43.9 PULMONARY EMPHYSEMA, UNSPECIFIED EMPHYSEMA TYPE (HCC): ICD-10-CM

## 2018-06-08 DIAGNOSIS — E66.01 OBESITY, CLASS III, BMI 40-49.9 (MORBID OBESITY) (HCC): ICD-10-CM

## 2018-06-08 DIAGNOSIS — R07.89 OTHER CHEST PAIN: ICD-10-CM

## 2018-06-08 NOTE — LETTER
Mayur Ina 1960 6/8/2018 Dear MD Joanie Coburn MD 
 
I had the pleasure of evaluating  Ms. Bhumika Oconnor in office today. Below are the relevant portions of my assessment and plan of care. ICD-10-CM ICD-9-CM 1. Palpitations R00.2 785.1 [place] EXTERNAL MOBILE CV TELEMETRY W/I&REPORT UP TO 30 DAYS  
 6/18 r/o arrhythmias 2. Diastolic CHF, chronic ([facility]) I50.32 428.32 2D ECHO COMPLETE ADULT (TTE) 428.0   
 6/18 NYHA 3 
  
3. Other chest pain R07.89 786.59   
 chronic, nl stress 9/16, stable ekg 4. Essential hypertension, benign I10 401.1   
 controlled 5. Hyperlipidemia, unspecified hyperlipidemia type E78.5 272.4 LIPID PANEL  
   HEPATIC FUNCTION PANEL  
 6/18 used to take statins 6. SHERRIE on CPAP G47.33 327.23   
 Z99.89 V46.8 7. Pulmonary emphysema, unspecified emphysema type ([facility] [address] J43.9 492.8 8. Obesity, Class III, BMI 40-49.9 (morbid obesity) ([facility]) E66.01 278.01   
9. Cardiac murmur R01.1 785.2 2D ECHO COMPLETE ADULT (TTE) Current Outpatient Prescriptions Medication Sig Dispense Refill  insulin NPH/insulin regular (NOVOLIN 70/30 U-100 INSULIN) 100 unit/mL (70-30) injection by SubCUTAneous route. 8 units if blood sugar is 200-300 over 300 12 units  insulin glargine (LANTUS,BASAGLAR) 100 unit/mL (3 mL) inpn 28 Units by SubCUTAneous route nightly. Indications: type 2 diabetes mellitus 28 Units 0  
 umeclidinium (INCRUSE ELLIPTA) 62.5 mcg/actuation inhaler Take 1 Puff by inhalation daily. Indications: BRONCHOSPASM PREVENTION WITH COPD 1 Inhaler 0  
 Diabetic Supplies, Miscellan. misc Diabetic needles 1/2 inch 31 gauge - 1 injection daily 30 Each 5  
 Diabetic Supplies, Miscellan. misc Diabetic test strips - use four times daily 120 Each 0  
 Diabetic Supplies, Miscellan.  misc Diabetic lancets - use three times daily 90 Each 5  
 ipratropium (ATROVENT) 0.02 % soln 2.5 mL by Nebulization route four (4) times daily as needed. Indications: BRONCHOSPASM PREVENTION WITH COPD 30 Vial 0  
 albuterol (PROVENTIL VENTOLIN) 2.5 mg /3 mL (0.083 %) nebulizer solution 3 mL by Nebulization route every four (4) hours as needed for Wheezing. Indications: BRONCHOSPASM PREVENTION, Chronic Obstructive Pulmonary Disease 30 Each 0  
 omeprazole (PRILOSEC) 40 mg capsule Take 40 mg by mouth daily. Indications: gastroesophageal reflux disease  lisinopril (PRINIVIL, ZESTRIL) 40 mg tablet Take 40 mg by mouth daily. Indications: hypertension  simethicone (MYLICON) 80 mg chewable tablet Take 80 mg by mouth every six (6) hours as needed for Flatulence.  cpap machine kit by Does Not Apply route nightly.  OXYGEN-AIR DELIVERY SYSTEMS 2 L by Nasal route continuous. [employer]  fluticasone-salmeterol (ADVAIR HFA) 230-21 mcg/actuation inhaler Take 2 Puffs by inhalation two (2) times a day.  Diabetic Supplies, [facility]. [facility] Diabetic syringes 1 mL - use one daily 30 Each 5  
 aspirin delayed-release 81 mg tablet Take 81 mg by mouth daily.  famotidine (PEPCID) 20 mg tablet Take 20 mg by mouth two (2) times daily as needed.  albuterol (PROVENTIL HFA, VENTOLIN HFA, PROAIR HFA) 90 mcg/actuation inhaler Take 2 Puffs by inhalation every four (4) hours as needed for Wheezing. For the next 2 days after hospital discharge, use your inhaler 4 times a day. 1 Inhaler 0  
 Inhalational Spacing Device (AEROCHAMBER) 1 Each by Does Not Apply route as needed for Cough or Other (COPD exacerbation). 1 Device 0  
 fluticasone (FLONASE) 50 mcg/actuation nasal spray 2 Sprays by Both Nostrils route daily.  montelukast (SINGULAIR) 10 mg tablet Take 10 mg by mouth nightly. Orders Placed This Encounter  LIPID PANEL Standing Status:   Future Standing Expiration Date:   9/8/2018  
 HEPATIC FUNCTION PANEL Standing Status:   Future Standing Expiration Date:   9/8/2018  2D ECHO COMPLETE ADULT (TTE) Standing Status:   Future Standing Expiration Date:   12/5/2018 Order Specific Question:   Reason for Exam: Answer:   murmur  [place] EXTERNAL MOBILE CV TELEMETRY W/I&REPORT UP TO 30 DAYS  insulin NPH/insulin regular (NOVOLIN 70/30 U-100 INSULIN) 100 unit/mL (70-30) injection Sig: by SubCUTAneous route. 8 units if blood sugar is 200-300 over 300 12 units If you have questions, please do not hesitate to call me. I look forward to following Ms. Ryne Ferro along with you. Sincerely, Hira Mathew MD

## 2018-06-08 NOTE — PROGRESS NOTES
HISTORY OF PRESENT ILLNESS  Debra Luis is a 61 y.o. female. Palpitations    The history is provided by the patient. This is a new problem. The current episode started more than 1 week ago. The problem occurs daily (almost). On average, each episode lasts 3 minutes (usually notices during sleep and is awakened, sometimes while awake and resting). The problem is associated with nothing. Associated symptoms include chest pain, lower extremity edema and shortness of breath. Pertinent negatives include no fever, no malaise/fatigue, no claudication, no orthopnea, no PND, no nausea, no vomiting, no headaches, no dizziness and no cough. Chest Pain (Angina)    The history is provided by the patient. This is a new problem. The current episode started more than 1 week ago. The problem occurs daily. The pain is associated with exertion, normal activity and rest (steps). The pain is present in the substernal region. The pain does not radiate. Associated symptoms include lower extremity edema, palpitations and shortness of breath. Pertinent negatives include no claudication, no cough, no dizziness, no fever, no headaches, no malaise/fatigue, no nausea, no orthopnea, no PND and no vomiting. She has tried rest for the symptoms. Shortness of Breath   The history is provided by the patient. This is a new problem. The problem occurs intermittently. The current episode started more than 1 week ago. Associated symptoms include chest pain and leg swelling. Pertinent negatives include no fever, no headaches, no cough, no wheezing, no PND, no orthopnea, no vomiting, no rash and no claudication. The problem's precipitants include exercise (50 ft; 2LPM for COPD since 2016 approx). Associated medical issues include COPD. Leg Swelling   The history is provided by the patient. This is a new problem. The current episode started more than 1 week ago. The problem occurs every several days.  Associated symptoms include chest pain and shortness of breath. Pertinent negatives include no headaches. The symptoms are aggravated by standing. The symptoms are relieved by sleep. Review of Systems   Constitutional: Negative for chills, fever, malaise/fatigue and weight loss. HENT: Negative for nosebleeds. Eyes: Negative for discharge. Respiratory: Positive for shortness of breath. Negative for cough and wheezing. Cardiovascular: Positive for chest pain, palpitations and leg swelling. Negative for orthopnea, claudication and PND. Gastrointestinal: Negative for diarrhea, nausea and vomiting. Genitourinary: Negative for dysuria and hematuria. Musculoskeletal: Negative for joint pain. Skin: Negative for rash. Neurological: Negative for dizziness, seizures, loss of consciousness and headaches. Endo/Heme/Allergies: Negative for polydipsia. Does not bruise/bleed easily. Psychiatric/Behavioral: Negative for depression and substance abuse. The patient does not have insomnia. Allergies   Allergen Reactions    Ancef [Cefazolin] Hives    Contrast Agent [Iodine] Anaphylaxis, Shortness of Breath and Swelling     Needs pre-medication for IV contrast with Benadryl, Solu-Medrol    Fish Containing Products Anaphylaxis     Pt states she had a severe allergic reaction at 9 y/o.  Metformin Other (comments)     Abdominal pain, diarrhea.     Codeine Other (comments)     Altered mental status       Past Medical History:   Diagnosis Date    Asthma     COPD     Cystocele, midline     Diabetes mellitus (HCC)     GERD (gastroesophageal reflux disease)     Hidradenitis suppurativa     Hyperlipidemia     Hypertension     SHERRIE on CPAP     CPAP    Stress incontinence        Family History   Problem Relation Age of Onset    Hypertension Mother     Stroke Mother     Breast Cancer Mother      Bilateral mastectomies    Cancer Mother      ovarian and breast    Heart Failure Mother     Heart Attack Father      2011    Heart Surgery Father      CABG    Heart Failure Father     COPD Sister      Heavy smoker    Cancer Sister      ovarian    Heart Failure Sister     Lung Disease Sister     Asthma Child     Cancer Maternal Aunt      pancreatic    Cancer Maternal Grandfather      stomach       Social History   Substance Use Topics    Smoking status: Former Smoker     Packs/day: 1.00     Years: 30.00     Types: Cigarettes     Start date: 5/6/1966     Quit date: 9/6/2006    Smokeless tobacco: Never Used      Comment: 1-1.5 packs per day    Alcohol use No        Current Outpatient Prescriptions   Medication Sig    insulin NPH/insulin regular (NOVOLIN 70/30 U-100 INSULIN) 100 unit/mL (70-30) injection by SubCUTAneous route. 8 units if blood sugar is 200-300 over 300 12 units    insulin glargine (LANTUS,BASAGLAR) 100 unit/mL (3 mL) inpn 28 Units by SubCUTAneous route nightly. Indications: type 2 diabetes mellitus    umeclidinium (INCRUSE ELLIPTA) 62.5 mcg/actuation inhaler Take 1 Puff by inhalation daily. Indications: BRONCHOSPASM PREVENTION WITH COPD    Diabetic Supplies, Miscellan. misc Diabetic needles 1/2 inch 31 gauge - 1 injection daily    Diabetic Supplies, [employer]. misc Diabetic test strips - use four times daily    Diabetic Supplies, [address] [facility] Diabetic lancets - use three times daily    ipratropium (ATROVENT) 0.02 % soln 2.5 mL by Nebulization route four (4) times daily as needed. Indications: BRONCHOSPASM PREVENTION WITH COPD    albuterol (PROVENTIL VENTOLIN) 2.5 mg /3 mL (0.083 %) nebulizer solution 3 mL by Nebulization route every four (4) hours as needed for Wheezing. Indications: BRONCHOSPASM PREVENTION, Chronic Obstructive Pulmonary Disease    omeprazole (PRILOSEC) 40 mg capsule Take 40 mg by mouth daily. Indications: gastroesophageal reflux disease    lisinopril (PRINIVIL, ZESTRIL) 40 mg tablet Take 40 mg by mouth daily.  Indications: hypertension    simethicone (MYLICON) 80 mg chewable tablet Take 80 mg by mouth every six (6) hours as needed for Flatulence.  cpap machine kit by Does Not Apply route nightly.  OXYGEN-AIR DELIVERY SYSTEMS 2 L by Nasal route continuous. [employer]    fluticasone-salmeterol (ADVAIR HFA) 230-21 mcg/actuation inhaler Take 2 Puffs by inhalation two (2) times a day.  Diabetic Supplies, [address] [facility] Diabetic syringes 1 mL - use one daily    aspirin delayed-release 81 mg tablet Take 81 mg by mouth daily.  famotidine (PEPCID) 20 mg tablet Take 20 mg by mouth two (2) times daily as needed.  albuterol (PROVENTIL HFA, VENTOLIN HFA, PROAIR HFA) 90 mcg/actuation inhaler Take 2 Puffs by inhalation every four (4) hours as needed for Wheezing. For the next 2 days after hospital discharge, use your inhaler 4 times a day.  Inhalational Spacing Device (AEROCHAMBER) 1 Each by Does Not Apply route as needed for Cough or Other (COPD exacerbation).  fluticasone (FLONASE) 50 mcg/actuation nasal spray 2 Sprays by Both Nostrils route daily.  montelukast (SINGULAIR) 10 mg tablet Take 10 mg by mouth nightly. No current facility-administered medications for this visit. Past Surgical History:   Procedure Laterality Date    BREAST SURGERY PROCEDURE UNLISTED      Right breast benign tumor removal    HX APPENDECTOMY      HX CHOLECYSTECTOMY      HX DILATION AND CURETTAGE      Dysfunctional uterine bleeding, thought 2/2 fibroids    HX TUBAL LIGATION         Visit Vitals    /73    Pulse 79    Ht 5' 4\" (1.626 m)    Wt 251 lb (113.9 kg)    BMI 43.08 kg/m2       Diagnostic Studies:  I have reviewed the relevant tests done on the patient and show as follows  EKG tracings reviewed by me today. No flowsheet data found. 9/16 Nuc Stress  Conclusion:   1. Normal scan.    2. Evidence of a fixed anterior defect is most likely due to soft tissue attenuation.    3. Normal wall motion and ejection fraction.    4. Low risk scan.   9/16 ECHO  SUMMARY:  Left ventricle: Systolic function was normal. Ejection fraction was  estimated to be 60 %. No obvious wall motion abnormalities identified in  the views obtained. There was moderate concentric hypertrophy. Doppler  parameters were consistent with abnormal left ventricular relaxation  (grade 1 diastolic dysfunction). Ms. Bhumika Oconnor has a reminder for a \"due or due soon\" health maintenance. I have asked that she contact her primary care provider for follow-up on this health maintenance. Physical Exam   Constitutional: She is oriented to person, place, and time. She appears well-developed and well-nourished. No distress. Severely obese   HENT:   Head: Normocephalic and atraumatic. Mouth/Throat: Normal dentition. Eyes: Right eye exhibits no discharge. Left eye exhibits no discharge. No scleral icterus. Neck: Neck supple. No JVD present. Carotid bruit is not present. No thyromegaly present. Cardiovascular: Normal rate, regular rhythm, S1 normal, S2 normal and intact distal pulses. Exam reveals no gallop and no friction rub. Murmur heard. Harsh early systolic murmur is present with a grade of 3/6  at the upper right sternal border radiating to the neck  Pulmonary/Chest: Effort normal and breath sounds normal. She has no wheezes. She has no rales. Abdominal: Soft. She exhibits no mass. There is no tenderness. Musculoskeletal: She exhibits no edema. Lymphadenopathy:        Right cervical: No superficial cervical adenopathy present. Left cervical: No superficial cervical adenopathy present. Neurological: She is alert and oriented to person, place, and time. Skin: Skin is warm and dry. No rash noted. Psychiatric: She has a normal mood and affect. Her behavior is normal.       ASSESSMENT and PLAN    I have reviewed/discussed the above normal BMI with the patient. I have recommended the following interventions: dietary management education, guidance, and counseling . Eric Dials           Diagnoses and all orders for this visit:    1. Palpitations  Comments:  6/18 r/o arrhythmias  Orders:  -     [place] EXTERNAL MOBILE CV TELEMETRY W/I&REPORT UP TO 30 DAYS    2. Diastolic CHF, chronic ([facility] [address]  Comments:  6/18 NYHA 3    Orders:  -     2D ECHO COMPLETE ADULT (TTE); Future    3. Other chest pain  Comments:  chronic, nl stress 9/16, stable ekg    4. Essential hypertension, benign  Comments:  controlled      5. Hyperlipidemia, unspecified hyperlipidemia type  Comments:  6/18 used to take statins  Orders:  -     LIPID PANEL; Future  -     HEPATIC FUNCTION PANEL; Future    6. SHERRIE on CPAP    7. Pulmonary emphysema, unspecified emphysema type ([facility]    8. Obesity, Class III, BMI 40-49.9 (morbid obesity) (HCC)    9. Cardiac murmur  -     2D ECHO COMPLETE ADULT (TTE); Future        Pertinent laboratory and test data reviewed and discussed with patient.   See patient instructions also for other medical advice given    Medications Discontinued During This Encounter   Medication Reason    insulin lispro (HUMALOG) 100 unit/mL injection Dose Adjustment    insulin lispro 100 unit/mL inph Discontinued by Another Clinician    levoFLOXacin (LEVAQUIN) 500 mg tablet Therapy Completed       Follow-up Disposition:  Return in about 6 weeks (around 7/20/2018), or if symptoms worsen or fail to improve, for post test.

## 2018-06-08 NOTE — PATIENT INSTRUCTIONS
Medications Discontinued During This Encounter   Medication Reason    insulin lispro (HUMALOG) 100 unit/mL injection Dose Adjustment    insulin lispro 100 unit/mL inph Discontinued by Another Clinician    levoFLOXacin (LEVAQUIN) 500 mg tablet Therapy Completed          Palpitations: Care Instructions  Your Care Instructions    Heart palpitations are the uncomfortable sensation that your heart is beating fast or irregularly. You might feel pounding or fluttering in your chest. It might feel like your heart is skipping a beat. Although palpitations may be caused by a heart problem, they also occur because of stress, fatigue, or use of alcohol, caffeine, or nicotine. Many medicines, including diet pills, antihistamines, decongestants, and some herbal products, can cause heart palpitations. Nearly everyone has palpitations from time to time. Depending on your symptoms, your doctor may need to do more tests to try to find the cause of your palpitations. Follow-up care is a key part of your treatment and safety. Be sure to make and go to all appointments, and call your doctor if you are having problems. It's also a good idea to know your test results and keep a list of the medicines you take. How can you care for yourself at home? · Avoid caffeine, nicotine, and excess alcohol. · Do not take illegal drugs, such as methamphetamines and cocaine. · Do not take weight loss or diet medicines unless you talk with your doctor first.  · Get plenty of sleep. · Do not overeat. · If you have palpitations again, take deep breaths and try to relax. This may slow a racing heart. · If you start to feel lightheaded, lie down to avoid injuries that might result if you pass out and fall down. · Keep a record of your palpitations and bring it to your next doctor's appointment. Write down:  ¨ The date and time. ¨ Your pulse.  (If your heart is beating fast, it may be hard to count your pulse.)  ¨ What you were doing when the palpitations started. ¨ How long the palpitations lasted. ¨ Any other symptoms. · If an activity causes palpitations, slow down or stop. Talk to your doctor before you do that activity again. · Take your medicines exactly as prescribed. Call your doctor if you think you are having a problem with your medicine. When should you call for help? Call 911 anytime you think you may need emergency care. For example, call if:  ? · You passed out (lost consciousness). ? · You have symptoms of a heart attack. These may include:  ¨ Chest pain or pressure, or a strange feeling in the chest.  ¨ Sweating. ¨ Shortness of breath. ¨ Pain, pressure, or a strange feeling in the back, neck, jaw, or upper belly or in one or both shoulders or arms. ¨ Lightheadedness or sudden weakness. ¨ A fast or irregular heartbeat. After you call 911, the  may tell you to chew 1 adult-strength or 2 to 4 low-dose aspirin. Wait for an ambulance. Do not try to drive yourself. ? · You have symptoms of a stroke. These may include:  ¨ Sudden numbness, tingling, weakness, or loss of movement in your face, arm, or leg, especially on only one side of your body. ¨ Sudden vision changes. ¨ Sudden trouble speaking. ¨ Sudden confusion or trouble understanding simple statements. ¨ Sudden problems with walking or balance. ¨ A sudden, severe headache that is different from past headaches. ?Call your doctor now or seek immediate medical care if:  ? · You have heart palpitations and:  ¨ Are dizzy or lightheaded, or you feel like you may faint. ¨ Have new or increased shortness of breath. ? Watch closely for changes in your health, and be sure to contact your doctor if:  ? · You continue to have heart palpitations. Where can you learn more? Go to http://etelvina-olivier.info/. Enter R508 in the search box to learn more about \"Palpitations: Care Instructions. \"  Current as of: September 21, 2016  Content Version: 11.4  © 4006-4030 [employer]. Care instructions adapted under license by [employer] (which disclaims liability or warranty for this information). If you have questions about a medical condition or this instruction, always ask your healthcare professional. [place] [address] any warranty or liability for your use of this information.

## 2018-06-08 NOTE — MR AVS SNAPSHOT
[address] 
 
 
 [address] [facility], [address] [facility] [address] 
[phone] Patient: Mayur Buckley MRN: [id number] YSX:[id number] Visit Information Date & Time Provider Department Dept. Phone Encounter #  
 6/8/2018 11:45 AM Tripp Moreno MD [facility] [address] [place] [address] [id number] Follow-up Instructions Return in about 6 weeks (around 7/20/2018), or if symptoms worsen or fail to improve, for post test.  
  
Your Appointments 6/27/2018  9:30 AM  
Follow Up with Caren Brady MD  
[address] ([address]) Appt Note: 3mo followup and Oxygen recert with walk testing [facility] [address] [place] [address], [address] [address]  
[phone]  
  
   
 [address] [place] [address], [address] [place] [address] Drive [address]  
  
    
 7/18/2018 10:45 AM  
PROCEDURE with [facility] ([address]) Appt Note: gupya [address] [facility], [address] [facility] [address]  
[address], [address] [facility] [address]  
  
    
 7/27/2018 11:00 AM  
Follow Up with Rodger Bahena DO [address] ([address]) Appt Note: FROM 5/30/18  
 [address] [place] [address], [address] [address]  
[phone]  
  
   
 [address] [place] [address], [address] [address]  
  
    
 7/30/2018 11:15 AM  
Office Visit with Tripp Moreno MD  
[facility]) Appt Note: post echo/event/labs [address] [facility], [address] [facility] [address]  
[address], [address] [address] Upcoming Health Maintenance Date Due Hepatitis C Screening 1960 FOOT EXAM Q1 8/21/1969 MICROALBUMIN Q1 8/21/1969 EYE EXAM RETINAL OR DILATED Q1 8/21/1969 Pneumococcal 19-64 Medium Risk (1 of 1 - PPSV23) 8/21/1978 DTaP/Tdap/Td series (1 - Tdap) 8/21/1980 PAP AKA CERVICAL CYTOLOGY 8/21/1980 FOBT Q 1 YEAR AGE 50-75 8/21/2009 LIPID PANEL Q1 11/20/2013 MEDICARE YEARLY EXAM 3/14/2018 HEMOGLOBIN A1C Q6M 7/24/2018 Influenza Age 5 to Adult 8/1/2018 BREAST CANCER SCRN MAMMOGRAM 11/16/2018 Allergies as of 6/8/2018  Review Complete On: 6/8/2018 By: Gerry Graves MD  
  
 Severity Noted Reaction Type Reactions Ancef [Cefazolin] High 09/30/2012    Hives Contrast Agent [Iodine] High 09/30/2012    Anaphylaxis, Shortness of Breath, Swelling Needs pre-medication for IV contrast with Benadryl, Solu-Medrol Fish Containing Products High 02/10/2017   Intolerance Anaphylaxis Pt states she had a severe allergic reaction at 9 y/o. Metformin  05/24/2015   Intolerance Other (comments) Abdominal pain, diarrhea. Codeine Low 09/30/2012    Other (comments) Altered mental status Current Immunizations  Never Reviewed Name Date Influenza Vaccine (Quad) PF 1/26/2018  4:19 PM  
  
 Not reviewed this visit You Were Diagnosed With   
  
 Codes Comments Palpitations    -  Primary ICD-10-CM: R00.2 ICD-9-CM: 785.1 6/18 r/o arrhythmias Diastolic CHF, chronic (HCC)     ICD-10-CM: I50.32 
ICD-9-CM: 428.32, 428.0 6/18 NYHA 3 Other chest pain     ICD-10-CM: R07.89 ICD-9-CM: 786.59 chronic, nl stress 9/16, stable ekg Essential hypertension, benign     ICD-10-CM: I10 
ICD-9-CM: 401.1 controlled Hyperlipidemia, unspecified hyperlipidemia type     ICD-10-CM: E78.5 ICD-9-CM: 272.4 6/18 used to take statins SHERRIE on CPAP     ICD-10-CM: G47.33, Z99.89 ICD-9-CM: 327.23, V46.8 Pulmonary emphysema, unspecified emphysema type ([facility] [address]     ICD-10-CM: J43.9 ICD-9-CM: 492.8 Obesity, Class III, BMI 40-49.9 (morbid obesity) (HCC)     ICD-10-CM: E66.01 
ICD-9-CM: 278.01 Cardiac murmur     ICD-10-CM: R01.1 ICD-9-CM: 481. 2 Vitals BP Pulse Height(growth percentile) Weight(growth percentile) BMI OB Status 139/73 79 5' 4\" (1.626 m) 251 lb (113.9 kg) 43.08 kg/m2 Menopause Smoking Status Former Smoker Vitals History BMI and BSA Data Body Mass Index Body Surface Area 43.08 kg/m 2 2.27 m 2 Preferred Pharmacy Pharmacy Name Phone [address] [facility]. [phone] Your Updated Medication List  
  
   
This list is accurate as of 6/8/18 12:14 PM.  Always use your most recent med list.  
  
  
  
  
 Dillon Budds 083-12 mcg/actuation inhaler Generic drug:  fluticasone-salmeterol Take 2 Puffs by inhalation two (2) times a day. * albuterol 90 mcg/actuation inhaler Commonly known as:  PROVENTIL HFA, VENTOLIN HFA, PROAIR HFA Take 2 Puffs by inhalation every four (4) hours as needed for Wheezing. For the next 2 days after hospital discharge, use your inhaler 4 times a day. * albuterol 2.5 mg /3 mL (0.083 %) nebulizer solution Commonly known as:  PROVENTIL VENTOLIN  
3 mL by Nebulization route every four (4) hours as needed for Wheezing. Indications: BRONCHOSPASM PREVENTION, Chronic Obstructive Pulmonary Disease  
  
 aspirin delayed-release 81 mg tablet Take 81 mg by mouth daily. cpap machine kit  
by Does Not Apply route nightly. * Diabetic Supplies, Sariah Neighbors. [facility] Diabetic syringes 1 mL - use one daily * Diabetic Supplies, Sariah Neighbors. [facility] Diabetic needles 1/2 inch 31 gauge - 1 injection daily * Diabetic Supplies, Auburn Neighbors. [facility] Diabetic test strips - use four times daily * Diabetic Supplies, Sariah Neighbors. [facility] Diabetic lancets - use three times daily  
  
 famotidine 20 mg tablet Commonly known as:  PEPCID Take 20 mg by mouth two (2) times daily as needed. FLONASE 50 mcg/actuation nasal spray Generic drug:  fluticasone 2 Sprays by Both Nostrils route daily. inhalational spacing device Commonly known as:  AEROCHAMBER  
 1 Each by Does Not Apply route as needed for Cough or Other (COPD exacerbation). insulin glargine 100 unit/mL (3 mL) Inpn Commonly known as:  JUDY MEEKS  
28 Units by SubCUTAneous route nightly. Indications: type 2 diabetes mellitus  
  
 ipratropium 0.02 % Soln Commonly known as:  ATROVENT  
2.5 mL by Nebulization route four (4) times daily as needed. Indications: BRONCHOSPASM PREVENTION WITH COPD  
  
 lisinopril 40 mg tablet Commonly known as:  Greg Jump Take 40 mg by mouth daily. Indications: hypertension NovoLIN 70/30 U-100 Insulin 100 unit/mL (70-30) injection Generic drug:  insulin NPH/insulin regular  
by SubCUTAneous route. 8 units if blood sugar is 200-300 over 300 12 units  
  
 omeprazole 40 mg capsule Commonly known as:  PRILOSEC Take 40 mg by mouth daily. Indications: gastroesophageal reflux disease OXYGEN-AIR DELIVERY SYSTEMS  
2 L by Nasal route continuous. [facility]  
  
 simethicone 80 mg chewable tablet Commonly known as:  Cleatis Locket Take 80 mg by mouth every six (6) hours as needed for Flatulence. SINGULAIR 10 mg tablet Generic drug:  montelukast  
Take 10 mg by mouth nightly. umeclidinium 62.5 mcg/actuation inhaler Commonly known as:  INCRUSE ELLIPTA Take 1 Puff by inhalation daily. Indications: BRONCHOSPASM PREVENTION WITH COPD * Notice: This list has 6 medication(s) that are the same as other medications prescribed for you. Read the directions carefully, and ask your doctor or other care provider to review them with you. We Performed the Following OR EXTERNAL MOBILE CV TELEMETRY W/I&REPORT UP TO 30 DAYS [86357 CPT(R)] Follow-up Instructions Return in about 6 weeks (around 7/20/2018), or if symptoms worsen or fail to improve, for post test.  
  
To-Do List   
 Around 06/08/2018 Lab:  HEPATIC FUNCTION PANEL Around 06/08/2018 Lab:  LIPID PANEL Around 07/11/2018 Cardiac Services:  2D ECHO COMPLETE ADULT (TTE) Patient Instructions Medications Discontinued During This Encounter Medication Reason  insulin lispro (HUMALOG) 100 unit/mL injection Dose Adjustment  insulin lispro 100 unit/mL inph Discontinued by Another Clinician  levoFLOXacin (LEVAQUIN) 500 mg tablet Therapy Completed Palpitations: Care Instructions Your Care Instructions Heart palpitations are the uncomfortable sensation that your heart is beating fast or irregularly. You might feel pounding or fluttering in your chest. It might feel like your heart is skipping a beat. Although palpitations may be caused by a heart problem, they also occur because of stress, fatigue, or use of alcohol, caffeine, or nicotine. Many medicines, including diet pills, antihistamines, decongestants, and some herbal products, can cause heart palpitations. Nearly everyone has palpitations from time to time. Depending on your symptoms, your doctor may need to do more tests to try to find the cause of your palpitations. Follow-up care is a key part of your treatment and safety. Be sure to make and go to all appointments, and call your doctor if you are having problems. It's also a good idea to know your test results and keep a list of the medicines you take. How can you care for yourself at home? · Avoid caffeine, nicotine, and excess alcohol. · Do not take illegal drugs, such as methamphetamines and cocaine. · Do not take weight loss or diet medicines unless you talk with your doctor first. 
· Get plenty of sleep. · Do not overeat. · If you have palpitations again, take deep breaths and try to relax. This may slow a racing heart. · If you start to feel lightheaded, lie down to avoid injuries that might result if you pass out and fall down. · Keep a record of your palpitations and bring it to your next doctor's appointment. Write down: ¨ The date and time. ¨ Your pulse. (If your heart is beating fast, it may be hard to count your pulse.) ¨ What you were doing when the palpitations started. ¨ How long the palpitations lasted. ¨ Any other symptoms. · If an activity causes palpitations, slow down or stop. Talk to your doctor before you do that activity again. · Take your medicines exactly as prescribed. Call your doctor if you think you are having a problem with your medicine. When should you call for help? Call 911 anytime you think you may need emergency care. For example, call if: 
? · You passed out (lost consciousness). ? · You have symptoms of a heart attack. These may include: ¨ Chest pain or pressure, or a strange feeling in the chest. 
¨ Sweating. ¨ Shortness of breath. ¨ Pain, pressure, or a strange feeling in the back, neck, jaw, or upper belly or in one or both shoulders or arms. ¨ Lightheadedness or sudden weakness. ¨ A fast or irregular heartbeat. After you call 911, the  may tell you to chew 1 adult-strength or 2 to 4 low-dose aspirin. Wait for an ambulance. Do not try to drive yourself. ? · You have symptoms of a stroke. These may include: 
¨ Sudden numbness, tingling, weakness, or loss of movement in your face, arm, or leg, especially on only one side of your body. ¨ Sudden vision changes. ¨ Sudden trouble speaking. ¨ Sudden confusion or trouble understanding simple statements. ¨ Sudden problems with walking or balance. ¨ A sudden, severe headache that is different from past headaches. ?Call your doctor now or seek immediate medical care if: 
? · You have heart palpitations and: ¨ Are dizzy or lightheaded, or you feel like you may faint. ¨ Have new or increased shortness of breath. ? Watch closely for changes in your health, and be sure to contact your doctor if: 
? · You continue to have heart palpitations. Where can you learn more? Go to http://etelvina-olivier.info/. Enter R508 in the search box to learn more about \"Palpitations: Care Instructions. \" Current as of: September 21, 2016 Content Version: 11.4 © 6673-0422 Healthwise, Incorporated. Care instructions adapted under license by [employer] (which disclaims liability or warranty for this information). If you have questions about a medical condition or this instruction, always ask your healthcare professional. [address] any warranty or liability for your use of this information. Introducing [facility]! [employer] introduces [employer] patient portal. Now you can access parts of your medical record, email your doctor's office, and request medication refills online. 1. In your internet browser, go to [email or web address] 2. Click on the First Time User? Click Here link in the Sign In box. You will see the New Member Sign Up page. 3. Enter your [employer] Access Code exactly as it appears below. You will not need to use this code after youve completed the sign-up process. If you do not sign up before the expiration date, you must request a new code. · [employer] Access Code: [email or web address] Expires: 7/16/2018  5:24 AM 
 
4. Enter the last four digits of your Social Security Number (xxxx) and Date of Birth (mm/dd/yyyy) as indicated and click Submit. You will be taken to the next sign-up page. 5. Create a [employer] ID. This will be your [employer] login ID and cannot be changed, so think of one that is secure and easy to remember. 6. Create a [employer] password. You can change your password at any time. 7. Enter your Password Reset Question and Answer. This can be used at a later time if you forget your password. 8. Enter your e-mail address. You will receive e-mail notification when new information is available in [address] 9. Click Sign Up. You can now view and download portions of your medical record. 10. Click the Download Summary menu link to download a portable copy of your medical information. If you have questions, please visit the Frequently Asked Questions section of the [employer] website. Remember, [employer] is NOT to be used for urgent needs. For medical emergencies, dial 911. Now available from your iPhone and Android! Please provide this summary of care documentation to your next provider. Your primary care clinician is listed as HAYDEN Palacios. If you have any questions after today's visit, please call [phone].

## 2018-06-08 NOTE — PROGRESS NOTES
Patient didn't bring medications, verbally reviewed    1. Have you been to the ER, urgent care clinic since your last visit? Hospitalized since your last visit? Yes When: 6/18 Where: [facility] Reason for visit: COPD    2. Have you seen or consulted any other health care providers outside of the [address] [place] [address] since your last visit? Include any pap smears or colon screening. Yes Where: PCP Reason for visit: Routine     3. Since your last visit, have you had any of the following symptoms? chest pains, palpitations, shortness of breath, dizziness and swelling in legs/arms. 4.  Have you had any blood work, X-rays or cardiac testing? Yes Where: [facility] Reason for visit: Patient was in hospital     Requested: NO     In [facility]: YES    5. Where do you normally have your labs drawn? PCP Office/[facility]    6. Do you need any refills today?    No

## 2018-06-12 ENCOUNTER — PATIENT OUTREACH (OUTPATIENT)
Dept: PULMONOLOGY | Age: 58
End: 2018-06-12

## 2018-06-12 NOTE — PROGRESS NOTES
Nurse Navigator attempted to contact patient on cell # given and used in past.  Person answered and was told writer called the wrong number. Nurse navigator has called all numbers listed in the past without success. Nurse navigator has been unsuccessful at contacting patient even using her daughter's number listed. Due to unavailability of patient and her Daughter not having patient contact Nurse Navigator as requested, closing patient's episode and sending letter to patient. 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 Nurse navigator unable to contact patient after multiple attempts on multiple days. No further nurse navigator follow up scheduled. Pt has nurse navigator's contact information for any further questions, concerns, or needs.   Patients upcoming visits:  Future Appointments  Date Time Provider Lisa Mixon   6/27/2018 9:30 AM Brittaney Rosado MD [address]   7/18/2018 10:45 AM [facility] SCHED   7/27/2018 11:00 AM Dave Clarke DO [address]   7/30/2018 11:15 AM Hira Mathew MD [address] [place] [address]      Episode closed

## 2018-06-12 NOTE — LETTER
6/12/2018 1:19 PM 
 
[facility]. Rao Stains [facility] 
[address] [facility] I am the Nurse Navigator working at [employer]. We last spoke on 3/21/18. I  
 
 have made several attempts to contact you for follow-up. I  have been unable to contact you at all the 
 
 numbers listed in your chart. Please contact the office at your earliest convenience to update your 
 
 contact information. If you have any further questions please call the office. Thank you for allowing us to participate in your care!  
 
 
 
 
 
Sincerely, 
 
 
 
Chaparrita Simmons RN

## 2018-06-28 ENCOUNTER — APPOINTMENT (OUTPATIENT)
Dept: GENERAL RADIOLOGY | Age: 58
End: 2018-06-28
Attending: PHYSICIAN ASSISTANT
Payer: MEDICARE

## 2018-06-28 ENCOUNTER — HOSPITAL ENCOUNTER (EMERGENCY)
Age: 58
Discharge: HOME | End: 2018-06-28
Attending: EMERGENCY MEDICINE
Payer: MEDICARE

## 2018-06-28 VITALS
DIASTOLIC BLOOD PRESSURE: 73 MMHG | SYSTOLIC BLOOD PRESSURE: 179 MMHG | HEIGHT: 63 IN | BODY MASS INDEX: 45.18 KG/M2 | TEMPERATURE: 98.2 F | HEART RATE: 93 BPM | OXYGEN SATURATION: 94 % | RESPIRATION RATE: 24 BRPM | WEIGHT: 255 LBS

## 2018-06-28 DIAGNOSIS — E11.8 TYPE 2 DIABETES MELLITUS WITH COMPLICATION, WITH LONG-TERM CURRENT USE OF INSULIN (HCC): ICD-10-CM

## 2018-06-28 DIAGNOSIS — Z79.4 TYPE 2 DIABETES MELLITUS WITH COMPLICATION, WITH LONG-TERM CURRENT USE OF INSULIN (HCC): ICD-10-CM

## 2018-06-28 DIAGNOSIS — J44.1 COPD EXACERBATION (HCC): Primary | ICD-10-CM

## 2018-06-28 LAB
ANION GAP SERPL CALC-SCNC: 7 MMOL/L (ref 3–18)
ATRIAL RATE: 78 BPM
BASOPHILS # BLD: 0 K/UL (ref 0–0.06)
BASOPHILS NFR BLD: 0 % (ref 0–2)
BNP SERPL-MCNC: 112 PG/ML (ref 0–900)
BUN SERPL-MCNC: 13 MG/DL (ref 7–18)
BUN/CREAT SERPL: 14 (ref 12–20)
CALCIUM SERPL-MCNC: 9.6 MG/DL (ref 8.5–10.1)
CALCULATED P AXIS, ECG09: 67 DEGREES
CALCULATED R AXIS, ECG10: 29 DEGREES
CALCULATED T AXIS, ECG11: 38 DEGREES
CHLORIDE SERPL-SCNC: 104 MMOL/L (ref 100–108)
CK MB CFR SERPL CALC: 1.3 % (ref 0–4)
CK MB CFR SERPL CALC: 1.3 % (ref 0–4)
CK MB SERPL-MCNC: 1.2 NG/ML (ref 5–25)
CK MB SERPL-MCNC: 1.2 NG/ML (ref 5–25)
CK SERPL-CCNC: 91 U/L (ref 26–192)
CK SERPL-CCNC: 92 U/L (ref 26–192)
CO2 SERPL-SCNC: 28 MMOL/L (ref 21–32)
CREAT SERPL-MCNC: 0.91 MG/DL (ref 0.6–1.3)
DIAGNOSIS, 93000: NORMAL
DIFFERENTIAL METHOD BLD: ABNORMAL
EOSINOPHIL # BLD: 0.1 K/UL (ref 0–0.4)
EOSINOPHIL NFR BLD: 2 % (ref 0–5)
ERYTHROCYTE [DISTWIDTH] IN BLOOD BY AUTOMATED COUNT: 13.1 % (ref 11.6–14.5)
GLUCOSE SERPL-MCNC: 256 MG/DL (ref 74–99)
HCT VFR BLD AUTO: 36.5 % (ref 35–45)
HGB BLD-MCNC: 12.6 G/DL (ref 12–16)
LYMPHOCYTES # BLD: 2.8 K/UL (ref 0.9–3.6)
LYMPHOCYTES NFR BLD: 48 % (ref 21–52)
MAGNESIUM SERPL-MCNC: 1.9 MG/DL (ref 1.6–2.6)
MCH RBC QN AUTO: 29.9 PG (ref 24–34)
MCHC RBC AUTO-ENTMCNC: 34.5 G/DL (ref 31–37)
MCV RBC AUTO: 86.5 FL (ref 74–97)
MONOCYTES # BLD: 0.4 K/UL (ref 0.05–1.2)
MONOCYTES NFR BLD: 7 % (ref 3–10)
NEUTS SEG # BLD: 2.4 K/UL (ref 1.8–8)
NEUTS SEG NFR BLD: 43 % (ref 40–73)
P-R INTERVAL, ECG05: 160 MS
PLATELET # BLD AUTO: 243 K/UL (ref 135–420)
PMV BLD AUTO: 8.6 FL (ref 9.2–11.8)
POTASSIUM SERPL-SCNC: 3.9 MMOL/L (ref 3.5–5.5)
Q-T INTERVAL, ECG07: 388 MS
QRS DURATION, ECG06: 82 MS
QTC CALCULATION (BEZET), ECG08: 442 MS
RBC # BLD AUTO: 4.22 M/UL (ref 4.2–5.3)
SODIUM SERPL-SCNC: 139 MMOL/L (ref 136–145)
TROPONIN I SERPL-MCNC: <0.02 NG/ML (ref 0–0.04)
TROPONIN I SERPL-MCNC: <0.02 NG/ML (ref 0–0.04)
VENTRICULAR RATE, ECG03: 78 BPM
WBC # BLD AUTO: 5.7 K/UL (ref 4.6–13.2)

## 2018-06-28 PROCEDURE — 96365 THER/PROPH/DIAG IV INF INIT: CPT

## 2018-06-28 PROCEDURE — 71046 X-RAY EXAM CHEST 2 VIEWS: CPT

## 2018-06-28 PROCEDURE — 80048 BASIC METABOLIC PNL TOTAL CA: CPT

## 2018-06-28 PROCEDURE — 96375 TX/PRO/DX INJ NEW DRUG ADDON: CPT

## 2018-06-28 PROCEDURE — 74011000250 HC RX REV CODE- 250: Performed by: PHYSICIAN ASSISTANT

## 2018-06-28 PROCEDURE — 83880 ASSAY OF NATRIURETIC PEPTIDE: CPT

## 2018-06-28 PROCEDURE — 74011250636 HC RX REV CODE- 250/636: Performed by: PHYSICIAN ASSISTANT

## 2018-06-28 PROCEDURE — 83735 ASSAY OF MAGNESIUM: CPT

## 2018-06-28 PROCEDURE — 82550 ASSAY OF CK (CPK): CPT

## 2018-06-28 PROCEDURE — 85025 COMPLETE CBC W/AUTO DIFF WBC: CPT

## 2018-06-28 PROCEDURE — 94640 AIRWAY INHALATION TREATMENT: CPT

## 2018-06-28 PROCEDURE — 99285 EMERGENCY DEPT VISIT HI MDM: CPT

## 2018-06-28 PROCEDURE — 77030029684 HC NEB SM VOL KT MONA -A

## 2018-06-28 PROCEDURE — 93005 ELECTROCARDIOGRAM TRACING: CPT

## 2018-06-28 RX ORDER — LEVOFLOXACIN 750 MG/1
750 TABLET ORAL DAILY
Qty: 5 TAB | Refills: 0 | Status: SHIPPED | OUTPATIENT
Start: 2018-06-28 | End: 2018-07-03

## 2018-06-28 RX ORDER — IPRATROPIUM BROMIDE AND ALBUTEROL SULFATE 2.5; .5 MG/3ML; MG/3ML
3 SOLUTION RESPIRATORY (INHALATION)
Status: COMPLETED | OUTPATIENT
Start: 2018-06-28 | End: 2018-06-28

## 2018-06-28 RX ORDER — MAGNESIUM SULFATE HEPTAHYDRATE 40 MG/ML
2 INJECTION, SOLUTION INTRAVENOUS ONCE
Status: COMPLETED | OUTPATIENT
Start: 2018-06-28 | End: 2018-06-28

## 2018-06-28 RX ORDER — ALBUTEROL SULFATE 0.83 MG/ML
2.5 SOLUTION RESPIRATORY (INHALATION)
Status: COMPLETED | OUTPATIENT
Start: 2018-06-28 | End: 2018-06-28

## 2018-06-28 RX ORDER — FUROSEMIDE 10 MG/ML
20 INJECTION INTRAMUSCULAR; INTRAVENOUS
Status: COMPLETED | OUTPATIENT
Start: 2018-06-28 | End: 2018-06-28

## 2018-06-28 RX ORDER — PREDNISONE 50 MG/1
50 TABLET ORAL DAILY
Qty: 3 TAB | Refills: 0 | Status: SHIPPED | OUTPATIENT
Start: 2018-06-28 | End: 2018-07-01

## 2018-06-28 RX ADMIN — MAGNESIUM SULFATE IN WATER 2 G: 40 INJECTION, SOLUTION INTRAVENOUS at 09:41

## 2018-06-28 RX ADMIN — FUROSEMIDE 20 MG: 10 INJECTION, SOLUTION INTRAMUSCULAR; INTRAVENOUS at 09:41

## 2018-06-28 RX ADMIN — IPRATROPIUM BROMIDE AND ALBUTEROL SULFATE 3 ML: .5; 3 SOLUTION RESPIRATORY (INHALATION) at 11:46

## 2018-06-28 RX ADMIN — ALBUTEROL SULFATE 2.5 MG: 2.5 SOLUTION RESPIRATORY (INHALATION) at 07:02

## 2018-06-28 NOTE — DISCHARGE INSTRUCTIONS
Chronic Obstructive Pulmonary Disease (COPD): Care Instructions  Your Care Instructions    Chronic obstructive pulmonary disease (COPD) is a general term for a group of lung diseases, including emphysema and chronic bronchitis. People with COPD have decreased airflow in and out of the lungs, which makes it hard to breathe. The airways also can get clogged with thick mucus. Cigarette smoking is a major cause of COPD. Although there is no cure for COPD, you can slow its progress. Following your treatment plan and taking care of yourself can help you feel better and live longer. Follow-up care is a key part of your treatment and safety. Be sure to make and go to all appointments, and call your doctor if you are having problems. It's also a good idea to know your test results and keep a list of the medicines you take. How can you care for yourself at home? ?Staying healthy  ? · Do not smoke. This is the most important step you can take to prevent more damage to your lungs. If you need help quitting, talk to your doctor about stop-smoking programs and medicines. These can increase your chances of quitting for good. ? · Avoid colds and flu. Get a pneumococcal vaccine shot. If you have had one before, ask your doctor whether you need a second dose. Get the flu vaccine every fall. If you must be around people with colds or the flu, wash your hands often. ? · Avoid secondhand smoke, air pollution, and high altitudes. Also avoid cold, dry air and hot, humid air. Stay at home with your windows closed when air pollution is bad. ?Medicines and oxygen therapy  ? · Take your medicines exactly as prescribed. Call your doctor if you think you are having a problem with your medicine. ? · You may be taking medicines such as:  ¨ Bronchodilators. These help open your airways and make breathing easier. Bronchodilators are either short-acting (work for 6 to 9 hours) or long-acting (work for 24 hours).  You inhale most bronchodilators, so they start to act quickly. Always carry your quick-relief inhaler with you in case you need it while you are away from home. ¨ Corticosteroids (prednisone, budesonide). These reduce airway inflammation. They come in pill or inhaled form. You must take these medicines every day for them to work well. ? · A spacer may help you get more inhaled medicine to your lungs. Ask your doctor or pharmacist if a spacer is right for you. If it is, ask how to use it properly. ? · Do not take any vitamins, over-the-counter medicine, or herbal products without talking to your doctor first.   ? · If your doctor prescribed antibiotics, take them as directed. Do not stop taking them just because you feel better. You need to take the full course of antibiotics. ? · Oxygen therapy boosts the amount of oxygen in your blood and helps you breathe easier. Use the flow rate your doctor has recommended, and do not change it without talking to your doctor first.   Activity  ? · Get regular exercise. Walking is an easy way to get exercise. Start out slowly, and walk a little more each day. ? · Pay attention to your breathing. You are exercising too hard if you cannot talk while you are exercising. ? · Take short rest breaks when doing household chores and other activities. ? · Learn breathing methods-such as breathing through pursed lips-to help you become less short of breath. ? · If your doctor has not set you up with a pulmonary rehabilitation program, talk to him or her about whether rehab is right for you. Rehab includes exercise programs, education about your disease and how to manage it, help with diet and other changes, and emotional support. Diet  ? · Eat regular, healthy meals. Use bronchodilators about 1 hour before you eat to make it easier to eat. Eat several small meals instead of three large ones. Drink beverages at the end of the meal. Avoid foods that are hard to chew.    ? · Eat foods that contain protein so that you do not lose muscle mass. ? · Talk with your doctor if you gain too much weight or if you lose weight without trying. ?Mental health  ? · Talk to your family, friends, or a therapist about your feelings. It is normal to feel frightened, angry, hopeless, helpless, and even guilty. Talking openly about bad feelings can help you cope. If these feelings last, talk to your doctor. When should you call for help? Call 911 anytime you think you may need emergency care. For example, call if:  ? · You have severe trouble breathing. ?Call your doctor now or seek immediate medical care if:  ? · You have new or worse trouble breathing. ? · You cough up blood. ? · You have a fever. ? Watch closely for changes in your health, and be sure to contact your doctor if:  ? · You cough more deeply or more often, especially if you notice more mucus or a change in the color of your mucus. ? · You have new or worse swelling in your legs or belly. ? · You are not getting better as expected. Where can you learn more? Go to http://etelvina-olivier.info/. Nerissa Ayoub in the search box to learn more about \"Chronic Obstructive Pulmonary Disease (COPD): Care Instructions. \"  Current as of: May 12, 2017  Content Version: 11.4  © 7401-7331 Healthwise, Incorporated. Care instructions adapted under license by [employer] (which disclaims liability or warranty for this information). If you have questions about a medical condition or this instruction, always ask your healthcare professional. [place] [address] any warranty or liability for your use of this information.

## 2018-06-28 NOTE — ED NOTES
Bedside shift change report given to Patricia Perry RN (oncoming nurse) by Roverto Lange RN (offgoing nurse). Report included the following information SBAR, ED Summary, MAR and Recent Results.  Pt in NAD heading to xray via wheelchair pushed by rad tech on 2L NC

## 2018-06-28 NOTE — ED PROVIDER NOTES
EMERGENCY DEPARTMENT HISTORY AND PHYSICAL EXAM    Date: 6/28/2018  Patient Name: Michelle White    History of Presenting Illness     Chief Complaint   Patient presents with    Shortness of Breath     History Provided By: Patient     Chief Complaint: SOB  Duration: 3 Hours  Timing:  Constant  Location: Respiratory   Modifying Factors: EMS administered solu-medrol, 1 duo-neb, 1 albuterol neb with improvement   Associated Symptoms: dry cough for 2 days, chest tightness     Additional History (Context):   Michelle White is a 61 y.o. female with a pertinent history of COPD, asthma, DM, HTN, HLD, GERD, presenting to the ED via EMS c/o constant, SOB starting 3 hr PTA after waking up. States the heat and humidity have been causing her COPD to trigger and be worse than usual. Also reports dry cough for 2 days. No fever or wheezing. She tried 2 puffs of her albuterol at home without relief. Reports that EMS administered solu-medrol, and 2 breathing Tx's with improvement. Notes she is normally on 2 L O2 via NC at home. She has been taking her home meds as rx. She is also on a holter monitor per Dr. Viktoria richardson for prev c/o palpations, but not currently feeling palpations. She also notes associated chest tightness c/w COPD exacerbations, but denies chest pain. EMS notes her O2 was 88% on 2L nasal cannula. Pt notes she quit smoking tobacco use 15 years ago. No other acute symptoms or complaints were noted.      PCP: Sveta Flanagan MD    Current Outpatient Prescriptions   Medication Sig Dispense Refill    predniSONE (DELTASONE) 50 mg tablet Take 1 Tab by mouth daily for 3 days. 3 Tab 0    levoFLOXacin (LEVAQUIN) 750 mg tablet Take 1 Tab by mouth daily for 5 days. 5 Tab 0    insulin NPH/insulin regular (NOVOLIN 70/30 U-100 INSULIN) 100 unit/mL (70-30) injection by SubCUTAneous route.  8 units if blood sugar is 200-300 over 300 12 units      insulin glargine (LANTUS,BASAGLAR) 100 unit/mL (3 mL) inpn 28 Units by SubCUTAneous route nightly. Indications: type 2 diabetes mellitus 28 Units 0    umeclidinium (INCRUSE ELLIPTA) 62.5 mcg/actuation inhaler Take 1 Puff by inhalation daily. Indications: BRONCHOSPASM PREVENTION WITH COPD 1 Inhaler 0    Diabetic Supplies, Miscellan. misc Diabetic needles 1/2 inch 31 gauge - 1 injection daily 30 Each 5    Diabetic Supplies, Miscellan. misc Diabetic test strips - use four times daily 120 Each 0    Diabetic Supplies, Miscellan. misc Diabetic lancets - use three times daily 90 Each 5    ipratropium (ATROVENT) 0.02 % soln 2.5 mL by Nebulization route four (4) times daily as needed. Indications: BRONCHOSPASM PREVENTION WITH COPD 30 Vial 0    albuterol (PROVENTIL VENTOLIN) 2.5 mg /3 mL (0.083 %) nebulizer solution 3 mL by Nebulization route every four (4) hours as needed for Wheezing. Indications: BRONCHOSPASM PREVENTION, Chronic Obstructive Pulmonary Disease 30 Each 0    omeprazole (PRILOSEC) 40 mg capsule Take 40 mg by mouth daily. Indications: gastroesophageal reflux disease      lisinopril (PRINIVIL, ZESTRIL) 40 mg tablet Take 40 mg by mouth daily. Indications: hypertension      simethicone (MYLICON) 80 mg chewable tablet Take 80 mg by mouth every six (6) hours as needed for Flatulence.  cpap machine kit by Does Not Apply route nightly.  OXYGEN-AIR DELIVERY SYSTEMS 2 L by Nasal route continuous. [employer]      fluticasone-salmeterol (ADVAIR HFA) 230-21 mcg/actuation inhaler Take 2 Puffs by inhalation two (2) times a day.  Diabetic Supplies, [address] [facility] Diabetic syringes 1 mL - use one daily 30 Each 5    aspirin delayed-release 81 mg tablet Take 81 mg by mouth daily.  famotidine (PEPCID) 20 mg tablet Take 20 mg by mouth two (2) times daily as needed.  albuterol (PROVENTIL HFA, VENTOLIN HFA, PROAIR HFA) 90 mcg/actuation inhaler Take 2 Puffs by inhalation every four (4) hours as needed for Wheezing.  For the next 2 days after hospital discharge, use your inhaler 4 times a day. 1 Inhaler 0    Inhalational Spacing Device (AEROCHAMBER) 1 Each by Does Not Apply route as needed for Cough or Other (COPD exacerbation). 1 Device 0    fluticasone (FLONASE) 50 mcg/actuation nasal spray 2 Sprays by Both Nostrils route daily.  montelukast (SINGULAIR) 10 mg tablet Take 10 mg by mouth nightly. Past History     Past Medical History:  Past Medical History:   Diagnosis Date    Asthma     COPD     Cystocele, midline     Diabetes mellitus ([facility] [address]     GERD (gastroesophageal reflux disease)     Hidradenitis suppurativa     Hyperlipidemia     Hypertension     SHERRIE on CPAP     CPAP    Stress incontinence        Past Surgical History:  Past Surgical History:   Procedure Laterality Date    BREAST SURGERY PROCEDURE UNLISTED      Right breast benign tumor removal    HX APPENDECTOMY      HX CHOLECYSTECTOMY      HX DILATION AND CURETTAGE      Dysfunctional uterine bleeding, thought 2/2 fibroids    HX TUBAL LIGATION         Family History:  Family History   Problem Relation Age of Onset    Hypertension Mother     Stroke Mother     Breast Cancer Mother      Bilateral mastectomies    Cancer Mother      ovarian and breast    Heart Failure Mother     Heart Attack Father      2011    Heart Surgery Father      CABG    Heart Failure Father     COPD Sister      Heavy smoker    Cancer Sister      ovarian    Heart Failure Sister     Lung Disease Sister     Asthma Child     Cancer Maternal Aunt      pancreatic    Cancer Maternal Grandfather      stomach       Social History:  Social History   Substance Use Topics    Smoking status: Former Smoker     Packs/day: 1.00     Years: 30.00     Types: Cigarettes     Start date: 5/6/1966     Quit date: 9/6/2006    Smokeless tobacco: Never Used      Comment: 1-1.5 packs per day    Alcohol use No       Allergies:   Allergies   Allergen Reactions    Ancef [Cefazolin] Hives    Contrast Agent [Iodine] Anaphylaxis, Shortness of Breath and Swelling     Needs pre-medication for IV contrast with Benadryl, Solu-Medrol    Fish Containing Products Anaphylaxis     Pt states she had a severe allergic reaction at 7 y/o.  Metformin Other (comments)     Abdominal pain, diarrhea.  Codeine Other (comments)     Altered mental status         Review of Systems   Review of Systems   Constitutional: Negative for chills and fever. HENT: Negative for congestion, ear pain, rhinorrhea and sore throat. Eyes: Negative for pain and redness. Respiratory: Positive for cough, chest tightness and shortness of breath. Negative for choking, wheezing and stridor. Cardiovascular: Negative for chest pain, palpitations and leg swelling. Gastrointestinal: Negative for abdominal pain, constipation, diarrhea, nausea and vomiting. Genitourinary: Negative for dysuria. Musculoskeletal: Negative for back pain and neck pain. Skin: Negative. Neurological: Negative for dizziness, syncope, speech difficulty, weakness, light-headedness, numbness and headaches. Psychiatric/Behavioral: Negative. All other systems reviewed and are negative. All Other Systems Negative  Physical Exam     Vitals:    06/28/18 0655 06/28/18 0730 06/28/18 0830 06/28/18 1100   BP:  161/69  149/72   Pulse:  81 80 81   Resp:  19 20 23   Temp:       SpO2:  93% 96% 97%   Weight: 115.7 kg (255 lb)      Height: 5' 3\" (1.6 m)        Physical Exam   Constitutional: She is oriented to person, place, and time. She appears well-developed and well-nourished. No distress. HENT:   Head: Normocephalic and atraumatic. Right Ear: Tympanic membrane, external ear and ear canal normal.   Left Ear: Tympanic membrane, external ear and ear canal normal.   Nose: Nose normal.   Mouth/Throat: Oropharynx is clear and moist and mucous membranes are normal. No oropharyngeal exudate. Eyes: Conjunctivae and EOM are normal. Pupils are equal, round, and reactive to light. Neck: Normal range of motion. Neck supple. Cardiovascular: Normal rate, regular rhythm and intact distal pulses. Exam reveals no gallop and no friction rub. No murmur heard. Pulmonary/Chest: Effort normal. Tachypnea noted. No bradypnea. She has no decreased breath sounds. She has wheezes (diffuse, mild, expiratory). She has no rhonchi. She has no rales. Mild expiratory wheeze with prolonged exp phase  Pt speaking 5-6 words at a time. Abdominal: Soft. Bowel sounds are normal. There is no tenderness. Musculoskeletal: Normal range of motion. She exhibits no edema or tenderness. No bilateral calf TTP   Lymphadenopathy:     She has no cervical adenopathy. Neurological: She is alert and oriented to person, place, and time. Skin: Skin is warm and dry. She is not diaphoretic. Psychiatric: She has a normal mood and affect. Her behavior is normal. Judgment and thought content normal.   Nursing note and vitals reviewed.              Diagnostic Study Results     Labs -     Recent Results (from the past 12 hour(s))   EKG, 12 LEAD, INITIAL    Collection Time: 06/28/18  6:47 AM   Result Value Ref Range    Ventricular Rate 78 BPM    Atrial Rate 78 BPM    P-R Interval 160 ms    QRS Duration 82 ms    Q-T Interval 388 ms    QTC Calculation (Bezet) 442 ms    Calculated P Axis 67 degrees    Calculated R Axis 29 degrees    Calculated T Axis 38 degrees    Diagnosis       Normal sinus rhythm  Normal ECG  When compared with ECG of 02-JUN-2018 20:50,  No significant change was found  Confirmed by Kathleen Smith ([id number]) on 6/28/2018 7:58:37 AM     CBC WITH AUTOMATED DIFF    Collection Time: 06/28/18  6:59 AM   Result Value Ref Range    WBC 5.7 4.6 - 13.2 K/uL    RBC 4.22 4.20 - 5.30 M/uL    HGB 12.6 12.0 - 16.0 g/dL    HCT 36.5 35.0 - 45.0 %    MCV 86.5 74.0 - 97.0 FL    MCH 29.9 24.0 - 34.0 PG    MCHC 34.5 31.0 - 37.0 g/dL    RDW 13.1 11.6 - 14.5 %    PLATELET 963 517 - 298 K/uL    MPV 8.6 (L) 9.2 - 11.8 FL NEUTROPHILS 43 40 - 73 %    LYMPHOCYTES 48 21 - 52 %    MONOCYTES 7 3 - 10 %    EOSINOPHILS 2 0 - 5 %    BASOPHILS 0 0 - 2 %    ABS. NEUTROPHILS 2.4 1.8 - 8.0 K/UL    ABS. LYMPHOCYTES 2.8 0.9 - 3.6 K/UL    ABS. MONOCYTES 0.4 0.05 - 1.2 K/UL    ABS. EOSINOPHILS 0.1 0.0 - 0.4 K/UL    ABS. BASOPHILS 0.0 0.0 - 0.06 K/UL    DF AUTOMATED     METABOLIC PANEL, BASIC    Collection Time: 06/28/18  6:59 AM   Result Value Ref Range    Sodium 139 136 - 145 mmol/L    Potassium 3.9 3.5 - 5.5 mmol/L    Chloride 104 100 - 108 mmol/L    CO2 28 21 - 32 mmol/L    Anion gap 7 3.0 - 18 mmol/L    Glucose 256 (H) 74 - 99 mg/dL    BUN 13 7.0 - 18 MG/DL    Creatinine 0.91 0.6 - 1.3 MG/DL    BUN/Creatinine ratio 14 12 - 20      GFR est AA >60 >60 ml/min/1.73m2    GFR est non-AA >60 >60 ml/min/1.73m2    Calcium 9.6 8.5 - 10.1 MG/DL   CARDIAC PANEL,(CK, CKMB & TROPONIN)    Collection Time: 06/28/18  6:59 AM   Result Value Ref Range    CK 92 26 - 192 U/L    CK - MB 1.2 <3.6 ng/ml    CK-MB Index 1.3 0.0 - 4.0 %    Troponin-I, Qt. <0.02 0.0 - 0.045 NG/ML   MAGNESIUM    Collection Time: 06/28/18  6:59 AM   Result Value Ref Range    Magnesium 1.9 1.6 - 2.6 mg/dL   NT-PRO BNP    Collection Time: 06/28/18  6:59 AM   Result Value Ref Range    NT pro- 0 - 900 PG/ML   CARDIAC PANEL,(CK, CKMB & TROPONIN)    Collection Time: 06/28/18 11:50 AM   Result Value Ref Range    CK 91 26 - 192 U/L    CK - MB 1.2 <3.6 ng/ml    CK-MB Index 1.3 0.0 - 4.0 %    Troponin-I, Qt. <0.02 0.0 - 0.045 NG/ML       Radiologic Studies -   XR CHEST PA LAT   Final Result        CT Results  (Last 48 hours)    None        CXR Results  (Last 48 hours)               06/28/18 0755  XR CHEST PA LAT Final result    Impression:  IMPRESSION:       Hyperinflation. Linear scarring right base. Stable mild cardiomegaly. Atherosclerosis.            Narrative:  EXAM: CHEST PA AND LATERAL        CLINICAL HISTORY/INDICATION: SOB which awoke the patient from sleep unresponsive   to inhaler, decreased oxygen saturation, history of COPD       COMPARISON: Chest x-ray June 2, 2018, March 13, 2018. TECHNIQUE: PA and lateral views        FINDINGS:        The cardiac silhouette is mildly enlarged. There is tortuosity of the aorta   with calcified plaque. The lungs are hyper inflated. Linear scarring at the   right lung base. Pulmonary vascularity is normal. The costophrenic angles are   sharply defined. Electronic device projects over the upper left chest. Mild disc   space narrowing with marginal spurring involves the mid and lower thoracic   spine. .                   Medical Decision Making   I am the first provider for this patient. I reviewed the vital signs, available nursing notes, past medical history, past surgical history, family history and social history. Vital Signs-Reviewed the patient's vital signs. Pulse Oximetry Analysis - 100% on Aerosol mask with 2 L nasal cannula    Cardiac Monitor:  Rate: 80  Rhythm: sinus    EKG: Interpreted by the EP. Time Interpreted: 6677   Rate: 78   Rhythm: Sinus   Interpretation: Normal sinus rhythm    Comparison: No change. Records Reviewed: Nursing Notes, Old Medical Records, Previous electrocardiograms, Ambulance Run Sheet, Previous Radiology Studies and Previous Laboratory Studies    Procedures:  Procedures    Provider Notes (Medical Decision Making): DDx:  viral vs bacterial URI, asthma exacerbation, COPD, bronchitis, PNE, PE, allergies, pneumothorax, atypical CP, costochondritis/chest wall pain, HF, MI, TAA/AAA, pericarditis, trauma (cardiac contusion, rib Fx, etc), pleuritic chest pain, pleural effusion, intraabdominal process    58y/o  woman, PMhx of morbid obesity, COPD (on home O2 2lnc), SHERRIE (on CPAP), HTN, DM (insulin) who presents with 3 hours of SOB, chest tightness. Some nonproductive cough as well for 1-2 days. No f/c. EMS brought to ED, gave solumedrol, duoneb and albuterol.   Reports feeling somewhat better, but still feeling SOB. .    On exam, obese, sitting up in bed, NAD. Pleasant. HEENT, CV, abd benign. Lungs w/some mild wheezing throughout and long expiratory phase. No crackles. Some increased rate and SOB while speaking, but speaking in full sentences. Seems most likely COPD exacerbation. Could be pneumonia, ACS, effusion, pulm edema. No PMHx of CHF but possible as well. -CXR  -EKG  -CBC, CMP  -Card Enz x 1  -Albuterol x 1 (got 2 in field)  -Got Solumedrol in field  -Reassess    Pt still feeling SOB, will give IV mag and try a dose of lasix, reassess. Marsha Ayobu PA-C 9:22 AM     Pt reassessed and is feeling improved, states she feels like she can go home. Speaking without issue in full sentences. Lungs now CTAB. She is 97% at rest on 2L nasal cannula which is her norm at home. Pt was ambulated, stayed at 96% with ambulation. Will rx prednisone and levaquin for exacerbation, stressed importance of BG monitoring. Pt has f/u appt with her pulmonology 7/2/18, stressed importance of keeping this. Pt given strict ED return precautions. Marsha Ayoub PA-C 1:49 PM     MED RECONCILIATION:  No current facility-administered medications for this encounter. Current Outpatient Prescriptions   Medication Sig    predniSONE (DELTASONE) 50 mg tablet Take 1 Tab by mouth daily for 3 days.  levoFLOXacin (LEVAQUIN) 750 mg tablet Take 1 Tab by mouth daily for 5 days.  insulin NPH/insulin regular (NOVOLIN 70/30 U-100 INSULIN) 100 unit/mL (70-30) injection by SubCUTAneous route. 8 units if blood sugar is 200-300 over 300 12 units    insulin glargine (LANTUS,BASAGLAR) 100 unit/mL (3 mL) inpn 28 Units by SubCUTAneous route nightly. Indications: type 2 diabetes mellitus    umeclidinium (INCRUSE ELLIPTA) 62.5 mcg/actuation inhaler Take 1 Puff by inhalation daily. Indications: BRONCHOSPASM PREVENTION WITH COPD    Diabetic Supplies, Miscellan.  misc Diabetic needles 1/2 inch 31 gauge - 1 injection daily    Diabetic Supplies, [address] [facility] Diabetic test strips - use four times daily    Diabetic Supplies, [address] [facility] Diabetic lancets - use three times daily    ipratropium (ATROVENT) 0.02 % soln 2.5 mL by Nebulization route four (4) times daily as needed. Indications: BRONCHOSPASM PREVENTION WITH COPD    albuterol (PROVENTIL VENTOLIN) 2.5 mg /3 mL (0.083 %) nebulizer solution 3 mL by Nebulization route every four (4) hours as needed for Wheezing. Indications: BRONCHOSPASM PREVENTION, Chronic Obstructive Pulmonary Disease    omeprazole (PRILOSEC) 40 mg capsule Take 40 mg by mouth daily. Indications: gastroesophageal reflux disease    lisinopril (PRINIVIL, ZESTRIL) 40 mg tablet Take 40 mg by mouth daily. Indications: hypertension    simethicone (MYLICON) 80 mg chewable tablet Take 80 mg by mouth every six (6) hours as needed for Flatulence.  cpap machine kit by Does Not Apply route nightly.  OXYGEN-AIR DELIVERY SYSTEMS 2 L by Nasal route continuous. [employer]    fluticasone-salmeterol (ADVAIR HFA) 230-21 mcg/actuation inhaler Take 2 Puffs by inhalation two (2) times a day.  Diabetic Supplies, [address] [facility] Diabetic syringes 1 mL - use one daily    aspirin delayed-release 81 mg tablet Take 81 mg by mouth daily.  famotidine (PEPCID) 20 mg tablet Take 20 mg by mouth two (2) times daily as needed.  albuterol (PROVENTIL HFA, VENTOLIN HFA, PROAIR HFA) 90 mcg/actuation inhaler Take 2 Puffs by inhalation every four (4) hours as needed for Wheezing. For the next 2 days after hospital discharge, use your inhaler 4 times a day.  Inhalational Spacing Device (AEROCHAMBER) 1 Each by Does Not Apply route as needed for Cough or Other (COPD exacerbation).  fluticasone (FLONASE) 50 mcg/actuation nasal spray 2 Sprays by Both Nostrils route daily.  montelukast (SINGULAIR) 10 mg tablet Take 10 mg by mouth nightly. Disposition:  Discharge to home    DISCHARGE NOTE:   Pt has been reexamined. Stable and improved. . Patient has no new complaints, changes, or physical findings. Care plan outlined and precautions discussed. Results of labs, EKG, CXR  were reviewed with the patient. All medications were reviewed with the patient; will d/c home with prednisone and levaquin. All of pt's questions and concerns were addressed. Patient was instructed and agrees to follow up with PCP and pulmonology, as well as to return to the ED upon further deterioration. Patient is ready to go home. Follow-up Information     Follow up With Details Comments Contact Info    [facility] EMERGENCY DEPT  As needed, If symptoms worsen [address] [facility] [address]  [phone]    HAYDEN Lopez MD Go in 2 days  [address] [place] [address]      Arlen Whitehead MD Go on 7/2/2018 As scheduled. [facility] [id number]  [phone]            Current Discharge Medication List      START taking these medications    Details   predniSONE (DELTASONE) 50 mg tablet Take 1 Tab by mouth daily for 3 days. Qty: 3 Tab, Refills: 0      levoFLOXacin (LEVAQUIN) 750 mg tablet Take 1 Tab by mouth daily for 5 days. Qty: 5 Tab, Refills: 0         CONTINUE these medications which have NOT CHANGED    Details   insulin NPH/insulin regular (NOVOLIN 70/30 U-100 INSULIN) 100 unit/mL (70-30) injection by SubCUTAneous route. 8 units if blood sugar is 200-300 over 300 12 units      insulin glargine (LANTUS,BASAGLAR) 100 unit/mL (3 mL) inpn 28 Units by SubCUTAneous route nightly. Indications: type 2 diabetes mellitus  Qty: 28 Units, Refills: 0      umeclidinium (INCRUSE ELLIPTA) 62.5 mcg/actuation inhaler Take 1 Puff by inhalation daily. Indications: BRONCHOSPASM PREVENTION WITH COPD  Qty: 1 Inhaler, Refills: 0      !! Diabetic Supplies, [address] [facility] Diabetic needles 1/2 inch 31 gauge - 1 injection daily  Qty: 30 Each, Refills: 5      !! Diabetic Supplies, [address]  [facility] Diabetic test strips - use four times daily  Qty: 120 Each, Refills: 0      !! Diabetic Supplies, [address] [facility] Diabetic lancets - use three times daily  Qty: 90 Each, Refills: 5      ipratropium (ATROVENT) 0.02 % soln 2.5 mL by Nebulization route four (4) times daily as needed. Indications: BRONCHOSPASM PREVENTION WITH COPD  Qty: 30 Vial, Refills: 0      albuterol (PROVENTIL VENTOLIN) 2.5 mg /3 mL (0.083 %) nebulizer solution 3 mL by Nebulization route every four (4) hours as needed for Wheezing. Indications: BRONCHOSPASM PREVENTION, Chronic Obstructive Pulmonary Disease  Qty: 30 Each, Refills: 0      omeprazole (PRILOSEC) 40 mg capsule Take 40 mg by mouth daily. Indications: gastroesophageal reflux disease      lisinopril (PRINIVIL, ZESTRIL) 40 mg tablet Take 40 mg by mouth daily. Indications: hypertension      simethicone (MYLICON) 80 mg chewable tablet Take 80 mg by mouth every six (6) hours as needed for Flatulence. cpap machine kit by Does Not Apply route nightly. OXYGEN-AIR DELIVERY SYSTEMS 2 L by Nasal route continuous. [employer]      fluticasone-salmeterol (ADVAIR HFA) 230-21 mcg/actuation inhaler Take 2 Puffs by inhalation two (2) times a day. !! Diabetic Supplies, [address] [facility] Diabetic syringes 1 mL - use one daily  Qty: 30 Each, Refills: 5      aspirin delayed-release 81 mg tablet Take 81 mg by mouth daily. famotidine (PEPCID) 20 mg tablet Take 20 mg by mouth two (2) times daily as needed. albuterol (PROVENTIL HFA, VENTOLIN HFA, PROAIR HFA) 90 mcg/actuation inhaler Take 2 Puffs by inhalation every four (4) hours as needed for Wheezing. For the next 2 days after hospital discharge, use your inhaler 4 times a day. Qty: 1 Inhaler, Refills: 0      Inhalational Spacing Device (AEROCHAMBER) 1 Each by Does Not Apply route as needed for Cough or Other (COPD exacerbation). Qty: 1 Device, Refills: 0      fluticasone (FLONASE) 50 mcg/actuation nasal spray 2 Sprays by Both Nostrils route daily. montelukast (SINGULAIR) 10 mg tablet Take 10 mg by mouth nightly. !! - Potential duplicate medications found. Please discuss with provider. Diagnosis     Clinical Impression:   1. COPD exacerbation ([facility] [address]    2.  Type 2 diabetes mellitus with complication, with long-term current use of insulin ([facility] [address]

## 2018-06-28 NOTE — ED NOTES
Pt arrived to the ED by EMS with co SOB that started at 0400 am when she woke up. Hx COPD pt on 2 LPM O2 NC. Pt states she used her albuterol in haler twice wo relief. Medics state pt was 88 % on her home O2 upon their arrival. Pt was given a duo neb and and a albuterol nebulizer in medic. Pt A&Ox4 speaking in broken sentences from apparent SOB.

## 2018-07-06 ENCOUNTER — HOSPITAL ENCOUNTER (INPATIENT)
Age: 58
LOS: 3 days | Discharge: HOME HEALTH | DRG: 191 | End: 2018-07-10
Attending: EMERGENCY MEDICINE | Admitting: FAMILY MEDICINE
Payer: MEDICARE

## 2018-07-06 ENCOUNTER — APPOINTMENT (OUTPATIENT)
Dept: GENERAL RADIOLOGY | Age: 58
DRG: 191 | End: 2018-07-06
Attending: STUDENT IN AN ORGANIZED HEALTH CARE EDUCATION/TRAINING PROGRAM
Payer: MEDICARE

## 2018-07-06 DIAGNOSIS — E11.21 TYPE 2 DIABETES WITH NEPHROPATHY (HCC): ICD-10-CM

## 2018-07-06 DIAGNOSIS — J44.0 COPD WITH ACUTE BRONCHITIS (HCC): Primary | ICD-10-CM

## 2018-07-06 DIAGNOSIS — I50.32 DIASTOLIC CHF, CHRONIC (HCC): ICD-10-CM

## 2018-07-06 DIAGNOSIS — J20.9 COPD WITH ACUTE BRONCHITIS (HCC): Primary | ICD-10-CM

## 2018-07-06 DIAGNOSIS — E78.5 HYPERLIPIDEMIA, UNSPECIFIED HYPERLIPIDEMIA TYPE: Chronic | ICD-10-CM

## 2018-07-06 LAB
ANION GAP SERPL CALC-SCNC: 7 MMOL/L (ref 3–18)
BASOPHILS # BLD: 0 K/UL (ref 0–0.06)
BASOPHILS NFR BLD: 0 % (ref 0–2)
BUN SERPL-MCNC: 10 MG/DL (ref 7–18)
BUN/CREAT SERPL: 10 (ref 12–20)
CALCIUM SERPL-MCNC: 9.2 MG/DL (ref 8.5–10.1)
CHLORIDE SERPL-SCNC: 104 MMOL/L (ref 100–108)
CO2 SERPL-SCNC: 30 MMOL/L (ref 21–32)
CREAT SERPL-MCNC: 0.99 MG/DL (ref 0.6–1.3)
DIFFERENTIAL METHOD BLD: ABNORMAL
EOSINOPHIL # BLD: 0.1 K/UL (ref 0–0.4)
EOSINOPHIL NFR BLD: 2 % (ref 0–5)
ERYTHROCYTE [DISTWIDTH] IN BLOOD BY AUTOMATED COUNT: 13.4 % (ref 11.6–14.5)
GLUCOSE SERPL-MCNC: 212 MG/DL (ref 74–99)
HCT VFR BLD AUTO: 38.4 % (ref 35–45)
HGB BLD-MCNC: 13.2 G/DL (ref 12–16)
LYMPHOCYTES # BLD: 2.3 K/UL (ref 0.9–3.6)
LYMPHOCYTES NFR BLD: 40 % (ref 21–52)
MCH RBC QN AUTO: 29.9 PG (ref 24–34)
MCHC RBC AUTO-ENTMCNC: 34.4 G/DL (ref 31–37)
MCV RBC AUTO: 86.9 FL (ref 74–97)
MONOCYTES # BLD: 0.4 K/UL (ref 0.05–1.2)
MONOCYTES NFR BLD: 7 % (ref 3–10)
NEUTS SEG # BLD: 2.9 K/UL (ref 1.8–8)
NEUTS SEG NFR BLD: 51 % (ref 40–73)
PLATELET # BLD AUTO: 265 K/UL (ref 135–420)
PMV BLD AUTO: 8.8 FL (ref 9.2–11.8)
POTASSIUM SERPL-SCNC: 3.7 MMOL/L (ref 3.5–5.5)
RBC # BLD AUTO: 4.42 M/UL (ref 4.2–5.3)
SODIUM SERPL-SCNC: 141 MMOL/L (ref 136–145)
WBC # BLD AUTO: 5.7 K/UL (ref 4.6–13.2)

## 2018-07-06 PROCEDURE — 99285 EMERGENCY DEPT VISIT HI MDM: CPT

## 2018-07-06 PROCEDURE — 93005 ELECTROCARDIOGRAM TRACING: CPT

## 2018-07-06 PROCEDURE — 80048 BASIC METABOLIC PNL TOTAL CA: CPT

## 2018-07-06 PROCEDURE — 85025 COMPLETE CBC W/AUTO DIFF WBC: CPT

## 2018-07-06 PROCEDURE — 82553 CREATINE MB FRACTION: CPT

## 2018-07-06 PROCEDURE — 85379 FIBRIN DEGRADATION QUANT: CPT

## 2018-07-06 PROCEDURE — 71046 X-RAY EXAM CHEST 2 VIEWS: CPT

## 2018-07-06 PROCEDURE — 83880 ASSAY OF NATRIURETIC PEPTIDE: CPT

## 2018-07-06 PROCEDURE — 83036 HEMOGLOBIN GLYCOSYLATED A1C: CPT

## 2018-07-06 RX ORDER — MAGNESIUM SULFATE HEPTAHYDRATE 40 MG/ML
2 INJECTION, SOLUTION INTRAVENOUS
Status: COMPLETED | OUTPATIENT
Start: 2018-07-06 | End: 2018-07-07

## 2018-07-06 RX ORDER — IPRATROPIUM BROMIDE AND ALBUTEROL SULFATE 2.5; .5 MG/3ML; MG/3ML
3 SOLUTION RESPIRATORY (INHALATION)
Status: COMPLETED | OUTPATIENT
Start: 2018-07-06 | End: 2018-07-07

## 2018-07-06 NOTE — IP AVS SNAPSHOT
[address] Patient: Samantha Spangler MRN: [id number] A check cole indicates which time of day the medication should be taken. My Medications START taking these medications Instructions Each Dose to Equal  
 Morning Noon Evening Bedtime NovoLOG Flexpen U-100 Insulin 100 unit/mL Inpn Generic drug:  insulin aspart U-100 Your last dose was: Your next dose is:    
   
   
 Continue home Sliding scale insulin as prior to admission  
     
   
   
   
  
 predniSONE 20 mg tablet Commonly known as:  Charlotte Aver Your last dose was: Your next dose is: Take 3 tablets for 9 days. Then take 2 tables for 10 days. Then take 1 tablet for 10 days. Then take 1/2 tablet for 10 days. Then take 1/4 tablet for 10 days. Then stop. Indications: COPD CHANGE how you take these medications Instructions Each Dose to Equal  
 Morning Noon Evening Bedtime * ADVAIR -21 mcg/actuation inhaler Generic drug:  fluticasone-salmeterol What changed:  Another medication with the same name was added. Make sure you understand how and when to take each. Your last dose was: Your next dose is: Take 2 Puffs by inhalation two (2) times a day. 2 Puff * fluticasone-salmeterol 100-50 mcg/dose diskus inhaler Commonly known as:  ADVAIR DISKUS What changed: You were already taking a medication with the same name, and this prescription was added. Make sure you understand how and when to take each. Your last dose was: Your next dose is: Take 1 Puff by inhalation every twelve (12) hours. 1 Puff  
    
   
   
   
  
 insulin glargine 100 unit/mL (3 mL) Inpn Commonly known as:  Gene Leola What changed:  how much to take Your last dose was: Your next dose is:    
   
   
 30 Units by SubCUTAneous route nightly. Indications: type 2 diabetes mellitus 30 Units * Notice: This list has 2 medication(s) that are the same as other medications prescribed for you. Read the directions carefully, and ask your doctor or other care provider to review them with you. CONTINUE taking these medications Instructions Each Dose to Equal  
 Morning Noon Evening Bedtime * albuterol 90 mcg/actuation inhaler Commonly known as:  PROVENTIL HFA, VENTOLIN HFA, PROAIR HFA Your last dose was: Your next dose is: Take 2 Puffs by inhalation every four (4) hours as needed for Wheezing. For the next 2 days after hospital discharge, use your inhaler 4 times a day. 2 Puff * albuterol 2.5 mg /3 mL (0.083 %) nebulizer solution Commonly known as:  PROVENTIL VENTOLIN Your last dose was: Your next dose is:    
   
   
 3 mL by Nebulization route every four (4) hours as needed for Wheezing. Indications: BRONCHOSPASM PREVENTION, Chronic Obstructive Pulmonary Disease 2.5 mg  
    
   
   
   
  
 aspirin delayed-release 81 mg tablet Your last dose was: Your next dose is: Take 81 mg by mouth daily. 81 mg  
    
   
   
   
  
 cpap machine kit Your last dose was: Your next dose is:    
   
   
 by Does Not Apply route nightly. * Diabetic Supplies, [address] Misc Your last dose was: Your next dose is:    
   
   
 Diabetic syringes 1 mL - use one daily * Diabetic Supplies, [address] Misc Your last dose was: Your next dose is:    
   
   
 Diabetic needles 1/2 inch 31 gauge - 1 injection daily * Diabetic Supplies, [address] Misc Your last dose was: Your next dose is:    
   
   
 Diabetic test strips - use four times daily * Diabetic Supplies, [facility] [address] [place] Your last dose was: Your next dose is:    
   
   
 Diabetic lancets - use three times daily  
     
   
   
   
  
 famotidine 20 mg tablet Commonly known as:  PEPCID Your last dose was: Your next dose is: Take 20 mg by mouth two (2) times daily as needed. 20 mg  
    
   
   
   
  
 FLONASE 50 mcg/actuation nasal spray Generic drug:  fluticasone Your last dose was: Your next dose is: 2 Sprays by Both Nostrils route daily. 2 Spray  
    
   
   
   
  
 inhalational spacing device Commonly known as:  AEROCHAMBER Your last dose was: Your next dose is:    
   
   
 1 Each by Does Not Apply route as needed for Cough or Other (COPD exacerbation). 1 Each  
    
   
   
   
  
 ipratropium 0.02 % Soln Commonly known as:  ATROVENT Your last dose was: Your next dose is:    
   
   
 2.5 mL by Nebulization route four (4) times daily as needed. Indications: BRONCHOSPASM PREVENTION WITH COPD  
 0.5 mg  
    
   
   
   
  
 lisinopril 40 mg tablet Commonly known as:  Ora Bee Your last dose was: Your next dose is: Take 20 mg by mouth two (2) times a day. Indications: hypertension 20 mg  
    
   
   
   
  
 omeprazole 40 mg capsule Commonly known as:  PRILOSEC Your last dose was: Your next dose is: Take 40 mg by mouth daily. Indications: gastroesophageal reflux disease 40 mg OXYGEN-AIR DELIVERY SYSTEMS Your last dose was: Your next dose is:    
   
   
 2 L by Nasal route continuous. [facility] 2 L  
    
   
   
   
  
 simethicone 80 mg chewable tablet Commonly known as:  Mistry Ores Your last dose was: Your next dose is: Take 80 mg by mouth every six (6) hours as needed for Flatulence.   
 80 mg  
    
   
   
   
 SINGULAIR 10 mg tablet Generic drug:  montelukast  
   
Your last dose was: Your next dose is: Take 10 mg by mouth nightly. 10 mg  
    
   
   
   
  
 umeclidinium 62.5 mcg/actuation inhaler Commonly known as:  INCRUSE ELLIPTA Your last dose was: Your next dose is: Take 1 Puff by inhalation daily. Indications: BRONCHOSPASM PREVENTION WITH COPD  
 1 Puff * Notice: This list has 6 medication(s) that are the same as other medications prescribed for you. Read the directions carefully, and ask your doctor or other care provider to review them with you. STOP taking these medications NovoLIN 70/30 U-100 Insulin 100 unit/mL (70-30) injection Generic drug:  insulin NPH/insulin regular Where to Get Your Medications Information on where to get these meds will be given to you by the nurse or doctor. ! Ask your nurse or doctor about these medications  
  fluticasone-salmeterol 100-50 mcg/dose diskus inhaler  
 insulin glargine 100 unit/mL (3 mL) Inpn NovoLOG Flexpen U-100 Insulin 100 unit/mL Inpn  
 predniSONE 20 mg tablet

## 2018-07-06 NOTE — IP AVS SNAPSHOT
[address] [facility] [address] 
 
 
 [address] [facility] [address] [place] [address] Patient: Viv Villarreal MRN: [id number] UIM:[id number] About your hospitalization You were admitted on:  July 7, 2018 You last received care in the:  [facility] [address] [facility] You were discharged on:  July 10, 2018 Why you were hospitalized Your primary diagnosis was:  Copd Exacerbation (Hcc) Your diagnoses also included:  Essential Hypertension, Benign, Diabetes Mellitus, Type 2 (Hcc), Esophageal Reflux, Byron On Cpap Follow-up Information Follow up With Details Comments Contact Info Idell Koyanagi, MD In 1 week  [address] [facility] [address] [facility] [address] [phone] Liseth Domingo DO In 1 week  [address] [facility] [address] [address] 
[phone] Your Scheduled Appointments Wednesday July 18, 2018 10:45 AM EDT PROCEDURE with [facility] ([facility]) [address] [facility] 102 [facility] [address]  
[phone] Friday July 27, 2018 11:00 AM EDT Follow Up with Liseth Domingo DO [address] ([facility]) [address] [facility], [address] [address]  
[phone] Monday July 30, 2018 11:15 AM EDT Office Visit with King Mendez MD  
[facility]) [address] [facility], [address] [facility] [address]  
[phone] Tuesday July 31, 2018 11:45 AM EDT Follow Up with Mary Chavez MD  
[address] ([facility]) [address] [facility], [address] [address] [phone] Discharge Orders None A check cole indicates which time of day the medication should be taken. My Medications START taking these medications Instructions Each Dose to Equal  
 Morning Noon Evening Bedtime NovoLOG Flexpen U-100 Insulin 100 unit/mL Inpn Generic drug:  insulin aspart U-100 Your last dose was: Your next dose is:    
   
   
 Continue home Sliding scale insulin as prior to admission  
     
   
   
   
  
 predniSONE 20 mg tablet Commonly known as:  Nayla Lucas Your last dose was: Your next dose is: Take 3 tablets for 9 days. Then take 2 tables for 10 days. Then take 1 tablet for 10 days. Then take 1/2 tablet for 10 days. Then take 1/4 tablet for 10 days. Then stop. Indications: COPD CHANGE how you take these medications Instructions Each Dose to Equal  
 Morning Noon Evening Bedtime * ADVAIR -21 mcg/actuation inhaler Generic drug:  fluticasone-salmeterol What changed:  Another medication with the same name was added. Make sure you understand how and when to take each. Your last dose was: Your next dose is: Take 2 Puffs by inhalation two (2) times a day. 2 Puff * fluticasone-salmeterol 100-50 mcg/dose diskus inhaler Commonly known as:  ADVAIR DISKUS What changed: You were already taking a medication with the same name, and this prescription was added. Make sure you understand how and when to take each. Your last dose was: Your next dose is: Take 1 Puff by inhalation every twelve (12) hours. 1 Puff  
    
   
   
   
  
 insulin glargine 100 unit/mL (3 mL) Inpn Commonly known as:  Ad Murdock What changed:  how much to take Your last dose was: Your next dose is:    
   
   
 30 Units by SubCUTAneous route nightly. Indications: type 2 diabetes mellitus 30 Units * Notice: This list has 2 medication(s) that are the same as other medications prescribed for you. Read the directions carefully, and ask your doctor or other care provider to review them with you. CONTINUE taking these medications Instructions Each Dose to Equal  
 Morning Noon Evening Bedtime * albuterol 90 mcg/actuation inhaler Commonly known as:  PROVENTIL HFA, VENTOLIN HFA, PROAIR HFA Your last dose was: Your next dose is: Take 2 Puffs by inhalation every four (4) hours as needed for Wheezing. For the next 2 days after hospital discharge, use your inhaler 4 times a day. 2 Puff * albuterol 2.5 mg /3 mL (0.083 %) nebulizer solution Commonly known as:  PROVENTIL VENTOLIN Your last dose was: Your next dose is:    
   
   
 3 mL by Nebulization route every four (4) hours as needed for Wheezing. Indications: BRONCHOSPASM PREVENTION, Chronic Obstructive Pulmonary Disease 2.5 mg  
    
   
   
   
  
 aspirin delayed-release 81 mg tablet Your last dose was: Your next dose is: Take 81 mg by mouth daily. 81 mg  
    
   
   
   
  
 cpap machine kit Your last dose was: Your next dose is:    
   
   
 by Does Not Apply route nightly. * Diabetic Supplies, [address] Misc Your last dose was: Your next dose is:    
   
   
 Diabetic syringes 1 mL - use one daily * Diabetic Supplies, [address] Misc Your last dose was: Your next dose is:    
   
   
 Diabetic needles 1/2 inch 31 gauge - 1 injection daily * Diabetic Supplies, [address] Misc Your last dose was: Your next dose is:    
   
   
 Diabetic test strips - use four times daily * Diabetic Supplies, [address] Misc Your last dose was: Your next dose is:    
   
   
 Diabetic lancets - use three times daily  
     
   
   
   
  
 famotidine 20 mg tablet Commonly known as:  PEPCID Your last dose was: Your next dose is: Take 20 mg by mouth two (2) times daily as needed. 20 mg  
    
   
   
   
  
 FLONASE 50 mcg/actuation nasal spray Generic drug:  fluticasone Your last dose was: Your next dose is: 2 Sprays by Both Nostrils route daily. 2 Spray  
    
   
   
   
  
 inhalational spacing device Commonly known as:  AEROCHAMBER Your last dose was: Your next dose is:    
   
   
 1 Each by Does Not Apply route as needed for Cough or Other (COPD exacerbation). 1 Each  
    
   
   
   
  
 ipratropium 0.02 % Soln Commonly known as:  ATROVENT Your last dose was: Your next dose is:    
   
   
 2.5 mL by Nebulization route four (4) times daily as needed. Indications: BRONCHOSPASM PREVENTION WITH COPD  
 0.5 mg  
    
   
   
   
  
 lisinopril 40 mg tablet Commonly known as:  Mana Joseph Your last dose was: Your next dose is: Take 20 mg by mouth two (2) times a day. Indications: hypertension 20 mg  
    
   
   
   
  
 omeprazole 40 mg capsule Commonly known as:  PRILOSEC Your last dose was: Your next dose is: Take 40 mg by mouth daily. Indications: gastroesophageal reflux disease 40 mg OXYGEN-AIR DELIVERY SYSTEMS Your last dose was: Your next dose is:    
   
   
 2 L by Nasal route continuous. First Choice 2 L  
    
   
   
   
  
 simethicone 80 mg chewable tablet Commonly known as:  Nellie Whitman Your last dose was: Your next dose is: Take 80 mg by mouth every six (6) hours as needed for Flatulence. 80 mg  
    
   
   
   
  
 SINGULAIR 10 mg tablet Generic drug:  montelukast  
   
Your last dose was: Your next dose is: Take 10 mg by mouth nightly. 10 mg  
    
   
   
   
  
 umeclidinium 62.5 mcg/actuation inhaler Commonly known as:  INCRUSE ELLIPTA Your last dose was: Your next dose is: Take 1 Puff by inhalation daily. Indications: BRONCHOSPASM PREVENTION WITH COPD  
 1 Puff * Notice: This list has 6 medication(s) that are the same as other medications prescribed for you. Read the directions carefully, and ask your doctor or other care provider to review them with you. STOP taking these medications NovoLIN 70/30 U-100 Insulin 100 unit/mL (70-30) injection Generic drug:  insulin NPH/insulin regular Where to Get Your Medications Information on where to get these meds will be given to you by the nurse or doctor. ! Ask your nurse or doctor about these medications  
  fluticasone-salmeterol 100-50 mcg/dose diskus inhaler  
 insulin glargine 100 unit/mL (3 mL) Inpn NovoLOG Flexpen U-100 Insulin 100 unit/mL Inpn  
 predniSONE 20 mg tablet Discharge Instructions Chronic Obstructive Pulmonary Disease (COPD): Care Instructions Your Care Instructions Chronic obstructive pulmonary disease (COPD) is a general term for a group of lung diseases, including emphysema and chronic bronchitis. People with COPD have decreased airflow in and out of the lungs, which makes it hard to breathe. The airways also can get clogged with thick mucus. Cigarette smoking is a major cause of COPD. Although there is no cure for COPD, you can slow its progress. Following your treatment plan and taking care of yourself can help you feel better and live longer. Follow-up care is a key part of your treatment and safety. Be sure to make and go to all appointments, and call your doctor if you are having problems. It's also a good idea to know your test results and keep a list of the medicines you take. How can you care for yourself at home? 
 Staying healthy 
  · Do not smoke. This is the most important step you can take to prevent more damage to your lungs.  If you need help quitting, talk to your doctor about stop-smoking programs and medicines. These can increase your chances of quitting for good.  
  · Avoid colds and flu. Get a pneumococcal vaccine shot. If you have had one before, ask your doctor whether you need a second dose. Get the flu vaccine every fall. If you must be around people with colds or the flu, wash your hands often.  
  · Avoid secondhand smoke, air pollution, and high altitudes. Also avoid cold, dry air and hot, humid air. Stay at home with your windows closed when air pollution is bad.  
 Medicines and oxygen therapy 
  · Take your medicines exactly as prescribed. Call your doctor if you think you are having a problem with your medicine.  
  · You may be taking medicines such as: ¨ Bronchodilators. These help open your airways and make breathing easier. Bronchodilators are either short-acting (work for 6 to 9 hours) or long-acting (work for 24 hours). You inhale most bronchodilators, so they start to act quickly. Always carry your quick-relief inhaler with you in case you need it while you are away from home. ¨ Corticosteroids (prednisone, budesonide). These reduce airway inflammation. They come in pill or inhaled form. You must take these medicines every day for them to work well.  
  · A spacer may help you get more inhaled medicine to your lungs. Ask your doctor or pharmacist if a spacer is right for you. If it is, ask how to use it properly.  
  · Do not take any vitamins, over-the-counter medicine, or herbal products without talking to your doctor first.  
  · If your doctor prescribed antibiotics, take them as directed. Do not stop taking them just because you feel better. You need to take the full course of antibiotics.  
  · Oxygen therapy boosts the amount of oxygen in your blood and helps you breathe easier. Use the flow rate your doctor has recommended, and do not change it without talking to your doctor first.  
Activity   · Get regular exercise. Walking is an easy way to get exercise. Start out slowly, and walk a little more each day.  
  · Pay attention to your breathing. You are exercising too hard if you cannot talk while you are exercising.  
  · Take short rest breaks when doing household chores and other activities.  
  · Learn breathing methods-such as breathing through pursed lips-to help you become less short of breath.  
  · If your doctor has not set you up with a pulmonary rehabilitation program, talk to him or her about whether rehab is right for you. Rehab includes exercise programs, education about your disease and how to manage it, help with diet and other changes, and emotional support. Diet 
  · Eat regular, healthy meals. Use bronchodilators about 1 hour before you eat to make it easier to eat. Eat several small meals instead of three large ones. Drink beverages at the end of the meal. Avoid foods that are hard to chew.  
  · Eat foods that contain protein so that you do not lose muscle mass.  
  · Talk with your doctor if you gain too much weight or if you lose weight without trying.  
 Mental health 
  · Talk to your family, friends, or a therapist about your feelings. It is normal to feel frightened, angry, hopeless, helpless, and even guilty. Talking openly about bad feelings can help you cope. If these feelings last, talk to your doctor. When should you call for help? Call 911 anytime you think you may need emergency care. For example, call if: 
  · You have severe trouble breathing.  
 Call your doctor now or seek immediate medical care if: 
  · You have new or worse trouble breathing.  
  · You cough up blood.  
  · You have a fever.  
 Watch closely for changes in your health, and be sure to contact your doctor if: 
  · You cough more deeply or more often, especially if you notice more mucus or a change in the color of your mucus.  
  · You have new or worse swelling in your legs or belly.   · You are not getting better as expected. Where can you learn more? Go to http://etelvina-olivier.info/. Nora Curran in the search box to learn more about \"Chronic Obstructive Pulmonary Disease (COPD): Care Instructions. \" Current as of: December 6, 2017 Content Version: 11.7 © 3353-4211 Empress[employer]. Care instructions adapted under license by L[employer]ou[employer] (which disclaims liability or warranty for this information). If you have questions about a medical condition or this instruction, always ask your healthcare professional. [place] [address] any warranty or liability for your use of this information. COPD Exacerbation Plan: Care Instructions Your Care Instructions If you have chronic obstructive pulmonary disease (COPD), your usual shortness of breath could suddenly get worse. You may start coughing more and have more mucus. This flare-up is called a COPD exacerbation (say \"dc-HEH-iz-BAY-shun\"). A lung infection or air pollution could set off an exacerbation. Sometimes it can happen after a quick change in temperature or being around chemicals. Work with your doctor to make a plan for dealing with an exacerbation. You can better manage it if you plan ahead. Follow-up care is a key part of your treatment and safety. Be sure to make and go to all appointments, and call your doctor if you are having problems. It's also a good idea to know your test results and keep a list of the medicines you take. How can you care for yourself at home? During an exacerbation · Do not panic if you start to have one. Quick treatment at home may help you prevent serious breathing problems. If you have a COPD exacerbation plan that you developed with your doctor, follow it. · Take your medicines exactly as your doctor tells you. ¨ Use your inhaler as directed by your doctor.  If your symptoms do not get better after you use your medicine, have someone take you to the emergency room. Call an ambulance if necessary. ¨ With inhaled medicines, a spacer or a nebulizer may help you get more medicine to your lungs. Ask your doctor or pharmacist how to use them properly. Practice using the spacer in front of a mirror before you have an exacerbation. This may help you get the medicine into your lungs quickly. ¨ If your doctor has given you steroid pills, take them as directed. ¨ Your doctor may have given you a prescription for antibiotics, which you can fill if you need it. ¨ Talk to your doctor if you have any problems with your medicine. And call your doctor if you have to use your antibiotic or steroid pills. Preventing an exacerbation · Do not smoke. This is the most important step you can take to prevent more damage to your lungs and prevent problems. If you already smoke, it is never too late to stop. If you need help quitting, talk to your doctor about stop-smoking programs and medicines. These can increase your chances of quitting for good. · Take your daily medicines as prescribed. · Avoid colds and flu. ¨ Get a pneumococcal vaccine. ¨ Get a flu vaccine each year, as soon as it is available. Ask those you live or work with to do the same, so they will not get the flu and infect you. ¨ Try to stay away from people with colds or the flu. ¨ Wash your hands often. · Avoid secondhand smoke; air pollution; cold, dry air; hot, humid air; and high altitudes. Stay at home with your windows closed when air pollution is bad. · Learn breathing techniques for COPD, such as breathing through pursed lips. These techniques can help you breathe easier during an exacerbation. When should you call for help? Call 911 anytime you think you may need emergency care. For example, call if: 
  · You have severe trouble breathing.  
  · You have severe chest pain.  
 Call your doctor now or seek immediate medical care if:   · You have new or worse shortness of breath.  
  · You develop new chest pain.  
  · You are coughing more deeply or more often, especially if you notice more mucus or a change in the color of your mucus.  
  · You cough up blood.  
  · You have new or increased swelling in your legs or belly.  
  · You have a fever.  
 Watch closely for changes in your health, and be sure to contact your doctor if: 
  · You need to use your antibiotic or steroid pills.  
  · Your symptoms are getting worse. Where can you learn more? Go to http://etelvina-olivier.info/. Enter H611 in the search box to learn more about \"COPD Exacerbation Plan: Care Instructions. \" Current as of: December 6, 2017 Content Version: 11.7 © 0904-0659 Peap.co. Care instructions adapted under license by Kid[employer]Shirt (which disclaims liability or warranty for this information). If you have questions about a medical condition or this instruction, always ask your healthcare professional. [place] [address] any warranty or liability for your use of this information. Patient armband removed and shredded DISCHARGE SUMMARY from Nurse PATIENT INSTRUCTIONS: 
 
 
F-face looks uneven A-arms unable to move or move unevenly S-speech slurred or non-existent T-time-call 911 as soon as signs and symptoms begin-DO NOT go Back to bed or wait to see if you get better-TIME IS BRAIN. Warning Signs of HEART ATTACK Call 911 if you have these symptoms: 
? Chest discomfort.  Most heart attacks involve discomfort in the center of the chest that lasts more than a few minutes, or that goes away and comes back. It can feel like uncomfortable pressure, squeezing, fullness, or pain. ? Discomfort in other areas of the upper body. Symptoms can include pain or discomfort in one or both arms, the back, neck, jaw, or stomach. ? Shortness of breath with or without chest discomfort. ? Other signs may include breaking out in a cold sweat, nausea, or lightheadedness. Don't wait more than five minutes to call [address]! Fast action can save your life. Calling 911 is almost always the fastest way to get lifesaving treatment. Emergency Medical Services staff can begin treatment when they arrive  up to an hour sooner than if someone gets to the hospital by car. The discharge information has been reviewed with the patient. The patient verbalized understanding. Discharge medications reviewed with the patient and appropriate educational materials and side effects teaching were provided. ___________________________________________________________________________________________________________________________________ Introducing [facility]! [facility] introduces [employer] patient portal. Now you can access parts of your medical record, email your doctor's office, and request medication refills online. 1. In your internet browser, go to [email or web address] [facility]/[employer]st 2. Click on the First Time User? Click Here link in the Sign In box. You will see the New Member Sign Up page. 3. Enter your [employer] Access Code exactly as it appears below. You will not need to use this code after youve completed the sign-up process. If you do not sign up before the expiration date, you must request a new code. · [employer] Access Code: [email or web address] Expires: 7/16/2018  5:24 AM 
 
4. Enter the last four digits of your Social Security Number (xxxx) and Date of Birth (mm/dd/yyyy) as indicated and click Submit. You will be taken to the next sign-up page. 5. Create a Azullo ID. This will be your Azullo login ID and cannot be changed, so think of one that is secure and easy to remember. 6. Create a Azullo password. You can change your password at any time. 7. Enter your Password Reset Question and Answer. This can be used at a later time if you forget your password. 8. Enter your e-mail address. You will receive e-mail notification when new information is available in [address] 9. Click Sign Up. You can now view and download portions of your medical record. 10. Click the Download Summary menu link to download a portable copy of your medical information. If you have questions, please visit the Frequently Asked Questions section of the Azullo website. Remember, Azullo is NOT to be used for urgent needs. For medical emergencies, dial 911. Now available from your iPhone and Android! Introducing Shahbaz Kilpatrick As a [facility] patient, I wanted to make you aware of our electronic visit tool called Shahbaz Alenangelitolawrence. [facility] 24/7 allows you to connect within minutes with a medical provider 24 hours a day, seven days a week via a mobile device or tablet or logging into a secure website from your computer. You can access Shahbaz Kilpatrick from anywhere in the [place]. A virtual visit might be right for you when you have a simple condition and feel like you just dont want to get out of bed, or cant get away from work for an appointment, when your regular [facility] provider is not available (evenings, weekends or holidays), or when youre out of town and need minor care. Electronic visits cost only $49 and if the [facility] 24/7 provider determines a prescription is needed to treat your condition, one can be electronically transmitted to a nearby pharmacy*. Please take a moment to enroll today if you have not already done so. The enrollment process is free and takes just a few minutes.   To enroll, please download the [facility] 24/7 james to your tablet or phone, or visit [email or web address] to enroll on your computer. And, as an [address] [place] [address] patient with a Sensbeat account, the results of your visits will be scanned into your electronic medical record and your primary care provider will be able to view the scanned results. We urge you to continue to see your regular [facility] provider for your ongoing medical care. And while your primary care provider may not be the one available when you seek a Ziploop virtual visit, the peace of mind you get from getting a real diagnosis real time can be priceless. For more information on Ziploop, view our Frequently Asked Questions (FAQs) at [email or web address]. Sincerely, 
 
Valentino Milks, MD 
Chief Medical Officer [facility] Veronica Hernandez *:  certain medications cannot be prescribed via Ziploop Unresulted Labs-Please follow up with your PCP about these lab tests Order Current Status ALLERGEN PROFILE, ZONE 2 In process IMMUNOGLOBULIN E, QT In process Providers Seen During Your Hospitalization Provider Specialty Primary office phone Bernardino Clinton MD Emergency Medicine [phone] Amy Amaya MD Family Practice [phone] Jakub Kerr MD Family Practice [phone] Your Primary Care Physician (PCP) Primary Care Physician Office Phone Office Fax Ni Hardy [phone] You are allergic to the following Allergen Reactions Ancef (Cefazolin) Hives Contrast Agent (Iodine) Anaphylaxis Shortness of Breath Swelling Needs pre-medication for IV contrast with Benadryl, Solu-Medrol Fish Containing Products Anaphylaxis Pt states she had a severe allergic reaction at 9 y/o. Metformin Other (comments) Abdominal pain, diarrhea. Codeine Other (comments) Altered mental status Recent Documentation Height Weight BMI OB Status Smoking Status 1.6 m 115.2 kg 44.99 kg/m2 Menopause Former Smoker Emergency Contacts Name Discharge Info Relation Home Work Mobile Caryn Curran DISCHARGE CAREGIVER [3] Daughter [21] [phone] JenniferSommer armstrong DISCHARGE CAREGIVER [3] Sister [23] [phone] Patient Belongings The following personal items are in your possession at time of discharge: 
  Dental Appliances: None  Visual Aid: None      Home Medications: None   Jewelry: None  Clothing: Dress, Slippers, Undergarments    Other Valuables: At bedside, Purse (30 day event monitor) Please provide this summary of care documentation to your next provider. Signatures-by signing, you are acknowledging that this After Visit Summary has been reviewed with you and you have received a copy. Patient Signature:  ____________________________________________________________ Date:  ____________________________________________________________  
  
Liv Thomas Provider Signature:  ____________________________________________________________ Date:  ____________________________________________________________

## 2018-07-07 ENCOUNTER — APPOINTMENT (OUTPATIENT)
Dept: NON INVASIVE DIAGNOSTICS | Age: 58
DRG: 191 | End: 2018-07-07
Attending: FAMILY MEDICINE
Payer: MEDICARE

## 2018-07-07 LAB
ANION GAP SERPL CALC-SCNC: 9 MMOL/L (ref 3–18)
ARTERIAL PATENCY WRIST A: YES
ATRIAL RATE: 76 BPM
BASE EXCESS BLD CALC-SCNC: 7 MMOL/L
BASOPHILS # BLD: 0 K/UL (ref 0–0.06)
BASOPHILS NFR BLD: 0 % (ref 0–2)
BDY SITE: ABNORMAL
BNP SERPL-MCNC: 37 PG/ML (ref 0–900)
BUN SERPL-MCNC: 12 MG/DL (ref 7–18)
BUN/CREAT SERPL: 11 (ref 12–20)
CALCIUM SERPL-MCNC: 9.1 MG/DL (ref 8.5–10.1)
CALCULATED P AXIS, ECG09: 80 DEGREES
CALCULATED R AXIS, ECG10: 24 DEGREES
CALCULATED T AXIS, ECG11: 64 DEGREES
CHLORIDE SERPL-SCNC: 102 MMOL/L (ref 100–108)
CK MB CFR SERPL CALC: 1.1 % (ref 0–4)
CK MB SERPL-MCNC: 1 NG/ML (ref 5–25)
CK SERPL-CCNC: 88 U/L (ref 26–192)
CO2 SERPL-SCNC: 26 MMOL/L (ref 21–32)
CREAT SERPL-MCNC: 1.05 MG/DL (ref 0.6–1.3)
D DIMER PPP FEU-MCNC: 0.27 UG/ML(FEU)
DIAGNOSIS, 93000: NORMAL
DIFFERENTIAL METHOD BLD: ABNORMAL
ECHO LA AREA 4C: 14.9 CM2
ECHO LA VOL 4C: 36.92 ML (ref 22–52)
ECHO LA VOLUME INDEX A4C: 17.25 ML/M2
ECHO LV E' LATERAL VELOCITY: 8.66 CM/S
ECHO LV INTERNAL DIMENSION DIASTOLIC: 4.06 CM (ref 3.9–5.3)
ECHO LV INTERNAL DIMENSION SYSTOLIC: 2.48 CM
ECHO LV ISOVOLUMETRIC RELAXATION TIME (IVRT): 72.8 MS
ECHO LV IVSD: 1.06 CM (ref 0.6–0.9)
ECHO LV MASS 2D: 170.5 G (ref 67–162)
ECHO LV MASS INDEX 2D: 79.7 G/M2
ECHO LV POSTERIOR WALL DIASTOLIC: 1.13 CM (ref 0.6–0.9)
ECHO LVOT DIAM: 1.88 CM
ECHO MV A VELOCITY: 126.16 CM/S
ECHO MV E DECELERATION TIME (DT): 162 MS
ECHO MV E VELOCITY: 0.86 CM/S
ECHO MV E/A RATIO: 0.7
ECHO MV E/E' LATERAL: 9.9
EOSINOPHIL # BLD: 0 K/UL (ref 0–0.4)
EOSINOPHIL NFR BLD: 0 % (ref 0–5)
ERYTHROCYTE [DISTWIDTH] IN BLOOD BY AUTOMATED COUNT: 13.3 % (ref 11.6–14.5)
EST. AVERAGE GLUCOSE BLD GHB EST-MCNC: 180 MG/DL
GAS FLOW.O2 O2 DELIVERY SYS: ABNORMAL L/MIN
GAS FLOW.O2 SETTING OXYMISER: 2 L/M
GLUCOSE BLD STRIP.AUTO-MCNC: 315 MG/DL (ref 70–110)
GLUCOSE BLD STRIP.AUTO-MCNC: 328 MG/DL (ref 70–110)
GLUCOSE BLD STRIP.AUTO-MCNC: 340 MG/DL (ref 70–110)
GLUCOSE BLD STRIP.AUTO-MCNC: 375 MG/DL (ref 70–110)
GLUCOSE SERPL-MCNC: 325 MG/DL (ref 74–99)
HBA1C MFR BLD: 7.9 % (ref 4.2–5.6)
HCO3 BLD-SCNC: 32.5 MMOL/L (ref 22–26)
HCT VFR BLD AUTO: 38.4 % (ref 35–45)
HGB BLD-MCNC: 13.1 G/DL (ref 12–16)
LYMPHOCYTES # BLD: 0.8 K/UL (ref 0.9–3.6)
LYMPHOCYTES NFR BLD: 14 % (ref 21–52)
MCH RBC QN AUTO: 29.8 PG (ref 24–34)
MCHC RBC AUTO-ENTMCNC: 34.1 G/DL (ref 31–37)
MCV RBC AUTO: 87.5 FL (ref 74–97)
MONOCYTES # BLD: 0.1 K/UL (ref 0.05–1.2)
MONOCYTES NFR BLD: 1 % (ref 3–10)
NEUTS SEG # BLD: 4.8 K/UL (ref 1.8–8)
NEUTS SEG NFR BLD: 85 % (ref 40–73)
O2/TOTAL GAS SETTING VFR VENT: 0.28 %
P-R INTERVAL, ECG05: 150 MS
PCO2 BLD: 46.6 MMHG (ref 35–45)
PH BLD: 7.45 [PH] (ref 7.35–7.45)
PLATELET # BLD AUTO: 255 K/UL (ref 135–420)
PMV BLD AUTO: 9.1 FL (ref 9.2–11.8)
PO2 BLD: 110 MMHG (ref 80–100)
POTASSIUM SERPL-SCNC: 4.5 MMOL/L (ref 3.5–5.5)
Q-T INTERVAL, ECG07: 382 MS
QRS DURATION, ECG06: 88 MS
QTC CALCULATION (BEZET), ECG08: 429 MS
RBC # BLD AUTO: 4.39 M/UL (ref 4.2–5.3)
SAO2 % BLD: 98 % (ref 92–97)
SERVICE CMNT-IMP: ABNORMAL
SODIUM SERPL-SCNC: 137 MMOL/L (ref 136–145)
SPECIMEN TYPE: ABNORMAL
TROPONIN I SERPL-MCNC: <0.02 NG/ML (ref 0–0.04)
VENTRICULAR RATE, ECG03: 76 BPM
WBC # BLD AUTO: 5.6 K/UL (ref 4.6–13.2)

## 2018-07-07 PROCEDURE — 74011250636 HC RX REV CODE- 250/636: Performed by: INTERNAL MEDICINE

## 2018-07-07 PROCEDURE — 77030029684 HC NEB SM VOL KT MONA -A

## 2018-07-07 PROCEDURE — 94640 AIRWAY INHALATION TREATMENT: CPT

## 2018-07-07 PROCEDURE — 74011000250 HC RX REV CODE- 250: Performed by: INTERNAL MEDICINE

## 2018-07-07 PROCEDURE — 36415 COLL VENOUS BLD VENIPUNCTURE: CPT | Performed by: FAMILY MEDICINE

## 2018-07-07 PROCEDURE — 96375 TX/PRO/DX INJ NEW DRUG ADDON: CPT

## 2018-07-07 PROCEDURE — 82962 GLUCOSE BLOOD TEST: CPT

## 2018-07-07 PROCEDURE — 74011000250 HC RX REV CODE- 250: Performed by: FAMILY MEDICINE

## 2018-07-07 PROCEDURE — 85025 COMPLETE CBC W/AUTO DIFF WBC: CPT | Performed by: FAMILY MEDICINE

## 2018-07-07 PROCEDURE — 96365 THER/PROPH/DIAG IV INF INIT: CPT

## 2018-07-07 PROCEDURE — 80048 BASIC METABOLIC PNL TOTAL CA: CPT | Performed by: FAMILY MEDICINE

## 2018-07-07 PROCEDURE — 74011250636 HC RX REV CODE- 250/636: Performed by: FAMILY MEDICINE

## 2018-07-07 PROCEDURE — 82803 BLOOD GASES ANY COMBINATION: CPT

## 2018-07-07 PROCEDURE — 97161 PT EVAL LOW COMPLEX 20 MIN: CPT

## 2018-07-07 PROCEDURE — 77030027138 HC INCENT SPIROMETER -A

## 2018-07-07 PROCEDURE — 74011000250 HC RX REV CODE- 250: Performed by: STUDENT IN AN ORGANIZED HEALTH CARE EDUCATION/TRAINING PROGRAM

## 2018-07-07 PROCEDURE — 74011250636 HC RX REV CODE- 250/636: Performed by: STUDENT IN AN ORGANIZED HEALTH CARE EDUCATION/TRAINING PROGRAM

## 2018-07-07 PROCEDURE — 74011000258 HC RX REV CODE- 258: Performed by: FAMILY MEDICINE

## 2018-07-07 PROCEDURE — 74011250637 HC RX REV CODE- 250/637: Performed by: STUDENT IN AN ORGANIZED HEALTH CARE EDUCATION/TRAINING PROGRAM

## 2018-07-07 PROCEDURE — 74011636637 HC RX REV CODE- 636/637: Performed by: STUDENT IN AN ORGANIZED HEALTH CARE EDUCATION/TRAINING PROGRAM

## 2018-07-07 PROCEDURE — 77010033678 HC OXYGEN DAILY

## 2018-07-07 PROCEDURE — 65270000029 HC RM PRIVATE

## 2018-07-07 PROCEDURE — 36600 WITHDRAWAL OF ARTERIAL BLOOD: CPT

## 2018-07-07 PROCEDURE — C8929 TTE W OR WO FOL WCON,DOPPLER: HCPCS

## 2018-07-07 RX ORDER — ALBUTEROL SULFATE 90 UG/1
2 AEROSOL, METERED RESPIRATORY (INHALATION)
Status: DISCONTINUED | OUTPATIENT
Start: 2018-07-07 | End: 2018-07-07 | Stop reason: CLARIF

## 2018-07-07 RX ORDER — MONTELUKAST SODIUM 10 MG/1
10 TABLET ORAL
Status: DISCONTINUED | OUTPATIENT
Start: 2018-07-07 | End: 2018-07-10 | Stop reason: HOSPADM

## 2018-07-07 RX ORDER — FLUTICASONE PROPIONATE 50 MCG
2 SPRAY, SUSPENSION (ML) NASAL DAILY
Status: DISCONTINUED | OUTPATIENT
Start: 2018-07-07 | End: 2018-07-10 | Stop reason: HOSPADM

## 2018-07-07 RX ORDER — PANTOPRAZOLE SODIUM 40 MG/1
40 TABLET, DELAYED RELEASE ORAL
Status: DISCONTINUED | OUTPATIENT
Start: 2018-07-07 | End: 2018-07-10 | Stop reason: HOSPADM

## 2018-07-07 RX ORDER — SIMETHICONE 80 MG
80 TABLET,CHEWABLE ORAL
Status: DISCONTINUED | OUTPATIENT
Start: 2018-07-07 | End: 2018-07-10 | Stop reason: HOSPADM

## 2018-07-07 RX ORDER — IPRATROPIUM BROMIDE AND ALBUTEROL SULFATE 2.5; .5 MG/3ML; MG/3ML
3 SOLUTION RESPIRATORY (INHALATION)
Status: DISCONTINUED | OUTPATIENT
Start: 2018-07-07 | End: 2018-07-09

## 2018-07-07 RX ORDER — MAGNESIUM SULFATE 100 %
4 CRYSTALS MISCELLANEOUS AS NEEDED
Status: DISCONTINUED | OUTPATIENT
Start: 2018-07-07 | End: 2018-07-10 | Stop reason: HOSPADM

## 2018-07-07 RX ORDER — ACETAMINOPHEN 325 MG/1
650 TABLET ORAL
Status: DISCONTINUED | OUTPATIENT
Start: 2018-07-07 | End: 2018-07-10 | Stop reason: HOSPADM

## 2018-07-07 RX ORDER — ALBUTEROL SULFATE 0.83 MG/ML
2.5 SOLUTION RESPIRATORY (INHALATION)
Status: DISCONTINUED | OUTPATIENT
Start: 2018-07-07 | End: 2018-07-10 | Stop reason: HOSPADM

## 2018-07-07 RX ORDER — ASPIRIN 81 MG/1
81 TABLET ORAL DAILY
Status: DISCONTINUED | OUTPATIENT
Start: 2018-07-07 | End: 2018-07-10 | Stop reason: HOSPADM

## 2018-07-07 RX ORDER — ARFORMOTEROL TARTRATE 15 UG/2ML
15 SOLUTION RESPIRATORY (INHALATION)
Status: DISCONTINUED | OUTPATIENT
Start: 2018-07-07 | End: 2018-07-10 | Stop reason: HOSPADM

## 2018-07-07 RX ORDER — INSULIN LISPRO 100 [IU]/ML
INJECTION, SOLUTION INTRAVENOUS; SUBCUTANEOUS
Status: DISCONTINUED | OUTPATIENT
Start: 2018-07-07 | End: 2018-07-10 | Stop reason: HOSPADM

## 2018-07-07 RX ORDER — IPRATROPIUM BROMIDE AND ALBUTEROL SULFATE 2.5; .5 MG/3ML; MG/3ML
3 SOLUTION RESPIRATORY (INHALATION)
Status: COMPLETED | OUTPATIENT
Start: 2018-07-07 | End: 2018-07-07

## 2018-07-07 RX ORDER — INSULIN GLARGINE 100 [IU]/ML
25 INJECTION, SOLUTION SUBCUTANEOUS
Status: DISCONTINUED | OUTPATIENT
Start: 2018-07-07 | End: 2018-07-08

## 2018-07-07 RX ORDER — ENOXAPARIN SODIUM 100 MG/ML
40 INJECTION SUBCUTANEOUS EVERY 24 HOURS
Status: DISCONTINUED | OUTPATIENT
Start: 2018-07-07 | End: 2018-07-10 | Stop reason: HOSPADM

## 2018-07-07 RX ORDER — BUDESONIDE 0.5 MG/2ML
500 INHALANT ORAL
Status: DISCONTINUED | OUTPATIENT
Start: 2018-07-07 | End: 2018-07-10 | Stop reason: HOSPADM

## 2018-07-07 RX ORDER — PREDNISONE 20 MG/1
40 TABLET ORAL
Status: DISCONTINUED | OUTPATIENT
Start: 2018-07-07 | End: 2018-07-07

## 2018-07-07 RX ORDER — LISINOPRIL 20 MG/1
20 TABLET ORAL 2 TIMES DAILY
Status: DISCONTINUED | OUTPATIENT
Start: 2018-07-07 | End: 2018-07-10 | Stop reason: HOSPADM

## 2018-07-07 RX ORDER — IPRATROPIUM BROMIDE AND ALBUTEROL SULFATE 2.5; .5 MG/3ML; MG/3ML
3 SOLUTION RESPIRATORY (INHALATION)
Status: DISCONTINUED | OUTPATIENT
Start: 2018-07-07 | End: 2018-07-07

## 2018-07-07 RX ORDER — DEXTROSE 50 % IN WATER (D50W) INTRAVENOUS SYRINGE
25-50 AS NEEDED
Status: DISCONTINUED | OUTPATIENT
Start: 2018-07-07 | End: 2018-07-10 | Stop reason: HOSPADM

## 2018-07-07 RX ADMIN — BUDESONIDE 500 MCG: 0.5 INHALANT RESPIRATORY (INHALATION) at 20:15

## 2018-07-07 RX ADMIN — INSULIN LISPRO 12 UNITS: 100 INJECTION, SOLUTION INTRAVENOUS; SUBCUTANEOUS at 08:48

## 2018-07-07 RX ADMIN — INSULIN LISPRO 15 UNITS: 100 INJECTION, SOLUTION INTRAVENOUS; SUBCUTANEOUS at 12:15

## 2018-07-07 RX ADMIN — IPRATROPIUM BROMIDE AND ALBUTEROL SULFATE 3 ML: 2.5; .5 SOLUTION RESPIRATORY (INHALATION) at 20:15

## 2018-07-07 RX ADMIN — PANTOPRAZOLE SODIUM 40 MG: 40 TABLET, DELAYED RELEASE ORAL at 08:48

## 2018-07-07 RX ADMIN — INSULIN GLARGINE 25 UNITS: 100 INJECTION, SOLUTION SUBCUTANEOUS at 22:03

## 2018-07-07 RX ADMIN — INSULIN LISPRO 12 UNITS: 100 INJECTION, SOLUTION INTRAVENOUS; SUBCUTANEOUS at 17:04

## 2018-07-07 RX ADMIN — SODIUM CHLORIDE 3.38 G: 900 INJECTION, SOLUTION INTRAVENOUS at 12:15

## 2018-07-07 RX ADMIN — ENOXAPARIN SODIUM 40 MG: 40 INJECTION, SOLUTION INTRAVENOUS; SUBCUTANEOUS at 04:32

## 2018-07-07 RX ADMIN — FLUTICASONE PROPIONATE 2 SPRAY: 50 SPRAY, METERED NASAL at 08:52

## 2018-07-07 RX ADMIN — IPRATROPIUM BROMIDE AND ALBUTEROL SULFATE 3 ML: .5; 3 SOLUTION RESPIRATORY (INHALATION) at 00:10

## 2018-07-07 RX ADMIN — IPRATROPIUM BROMIDE AND ALBUTEROL SULFATE 3 ML: .5; 3 SOLUTION RESPIRATORY (INHALATION) at 11:28

## 2018-07-07 RX ADMIN — MONTELUKAST SODIUM 10 MG: 10 TABLET, FILM COATED ORAL at 22:03

## 2018-07-07 RX ADMIN — METHYLPREDNISOLONE SODIUM SUCCINATE 125 MG: 125 INJECTION, POWDER, FOR SOLUTION INTRAMUSCULAR; INTRAVENOUS at 00:10

## 2018-07-07 RX ADMIN — INSULIN LISPRO 12 UNITS: 100 INJECTION, SOLUTION INTRAVENOUS; SUBCUTANEOUS at 22:02

## 2018-07-07 RX ADMIN — LISINOPRIL 20 MG: 20 TABLET ORAL at 17:05

## 2018-07-07 RX ADMIN — LISINOPRIL 20 MG: 20 TABLET ORAL at 08:48

## 2018-07-07 RX ADMIN — ARFORMOTEROL TARTRATE 15 MCG: 15 SOLUTION RESPIRATORY (INHALATION) at 20:15

## 2018-07-07 RX ADMIN — IPRATROPIUM BROMIDE AND ALBUTEROL SULFATE 3 ML: .5; 3 SOLUTION RESPIRATORY (INHALATION) at 06:00

## 2018-07-07 RX ADMIN — IPRATROPIUM BROMIDE AND ALBUTEROL SULFATE 3 ML: 2.5; .5 SOLUTION RESPIRATORY (INHALATION) at 15:06

## 2018-07-07 RX ADMIN — SODIUM CHLORIDE 3.38 G: 900 INJECTION, SOLUTION INTRAVENOUS at 06:00

## 2018-07-07 RX ADMIN — ASPIRIN 81 MG: 81 TABLET, COATED ORAL at 08:48

## 2018-07-07 RX ADMIN — SODIUM CHLORIDE 500 MG: 900 INJECTION, SOLUTION INTRAVENOUS at 06:45

## 2018-07-07 RX ADMIN — IPRATROPIUM BROMIDE AND ALBUTEROL SULFATE 3 ML: .5; 3 SOLUTION RESPIRATORY (INHALATION) at 00:44

## 2018-07-07 RX ADMIN — METHYLPREDNISOLONE SODIUM SUCCINATE 125 MG: 125 INJECTION, POWDER, FOR SOLUTION INTRAMUSCULAR; INTRAVENOUS at 14:56

## 2018-07-07 RX ADMIN — IPRATROPIUM BROMIDE AND ALBUTEROL SULFATE 3 ML: .5; 3 SOLUTION RESPIRATORY (INHALATION) at 08:15

## 2018-07-07 RX ADMIN — METHYLPREDNISOLONE SODIUM SUCCINATE 125 MG: 125 INJECTION, POWDER, FOR SOLUTION INTRAMUSCULAR; INTRAVENOUS at 22:02

## 2018-07-07 RX ADMIN — MAGNESIUM SULFATE HEPTAHYDRATE 2 G: 40 INJECTION, SOLUTION INTRAVENOUS at 00:10

## 2018-07-07 RX ADMIN — PERFLUTREN 2 ML: 6.52 INJECTION, SUSPENSION INTRAVENOUS at 12:06

## 2018-07-07 NOTE — ED PROVIDER NOTES
EMERGENCY DEPARTMENT HISTORY AND PHYSICAL EXAM      Date: 7/6/2018  Patient Name: Hillary Hopkins    History of Presenting Illness     Chief Complaint   Patient presents with    Shortness of Breath         History Provided By: Patient    Chief Complaint: Shortness of Breath  Duration:  Weeks  Timing:  Constant  Location: N/A  Quality: Pressure  Severity: Severe  Modifying Factors: None  Associated Symptoms: denies any other associated signs or symptoms      Additional History (Context): Hillary Hopkins is a 61 y.o. female with past medical history of morbid obesity, COPD (on 2L home O2 ), SHERRIE (on CPAP), HTN, IDDM who presents with ongoing shortness of breath and chest tightness. Pt recently seen in this ED for similar complaints, worked up and treated for COPD exacerbation vs new onset CHF with improvement in symptoms, pt discharged from ED with steroid taper (3 days), Levaquin, and instructions to follow up with Pulmonologist on 07/02/2018. Per pt, since discharge she has not returned to baseline despite steroid taper and abx. She continues to have SOB at home despite home O2. Her pulmonologist cancelled the 07/02 appointment, and yesterday at PCP office visit, she was instructed to do home duobnebs q6H, however her breathing has continued to be labored, and therefore she presented to [facility] ED for further evaluation and workup. Pt denies chest pain, however states that it feels as if \"an elephant is sitting on my chest.\" Denies f/c, n/v, abdominal pain.      PCP: Zohra Pimentel MD    Current Facility-Administered Medications   Medication Dose Route Frequency Provider Last Rate Last Dose    acetaminophen (TYLENOL) tablet 650 mg  650 mg Oral Q4H PRN Feng Sewell, DO        enoxaparin (LOVENOX) injection 40 mg  40 mg SubCUTAneous Q24H Feng Sewell, DO   40 mg at 07/07/18 1468       Past History     Past Medical History:  Past Medical History:   Diagnosis Date    Asthma     COPD     Cystocele, midline     Diabetes mellitus ([facility] [address]     GERD (gastroesophageal reflux disease)     Hidradenitis suppurativa     Hyperlipidemia     Hypertension     SHERRIE on CPAP     CPAP    Stress incontinence        Past Surgical History:  Past Surgical History:   Procedure Laterality Date    BREAST SURGERY PROCEDURE UNLISTED      Right breast benign tumor removal    HX APPENDECTOMY      HX CHOLECYSTECTOMY      HX DILATION AND CURETTAGE      Dysfunctional uterine bleeding, thought 2/2 fibroids    HX TUBAL LIGATION         Family History:  Family History   Problem Relation Age of Onset    Hypertension Mother     Stroke Mother     Breast Cancer Mother      Bilateral mastectomies    Cancer Mother      ovarian and breast    Heart Failure Mother     Heart Attack Father      2011    Heart Surgery Father      CABG    Heart Failure Father     COPD Sister      Heavy smoker    Cancer Sister      ovarian    Heart Failure Sister     Lung Disease Sister     Asthma Child     Cancer Maternal Aunt      pancreatic    Cancer Maternal Grandfather      stomach       Social History:  Social History   Substance Use Topics    Smoking status: Former Smoker     Packs/day: 1.00     Years: 30.00     Types: Cigarettes     Start date: 5/6/1966     Quit date: 9/6/2006    Smokeless tobacco: Never Used      Comment: 1-1.5 packs per day    Alcohol use No       Allergies: Allergies   Allergen Reactions    Ancef [Cefazolin] Hives    Contrast Agent [Iodine] Anaphylaxis, Shortness of Breath and Swelling     Needs pre-medication for IV contrast with Benadryl, Solu-Medrol    Fish Containing Products Anaphylaxis     Pt states she had a severe allergic reaction at 7 y/o.  Metformin Other (comments)     Abdominal pain, diarrhea.  Codeine Other (comments)     Altered mental status         Review of Systems   Review of Systems   Constitutional: Negative for chills and fever.    HENT: Negative for congestion, rhinorrhea and sore throat. Eyes: Negative for photophobia and visual disturbance. Respiratory: Positive for chest tightness and shortness of breath. Negative for cough. Cardiovascular: Positive for leg swelling. Negative for chest pain. Gastrointestinal: Negative for abdominal pain, constipation and diarrhea. Endocrine: Negative for polydipsia and polyphagia. Genitourinary: Negative for dysuria and flank pain. Musculoskeletal: Negative for arthralgias and myalgias. Neurological: Negative for dizziness, light-headedness and headaches. Psychiatric/Behavioral: Negative for agitation and confusion. Physical Exam     Vitals:    07/07/18 0330 07/07/18 0400 07/07/18 0416 07/07/18 0433   BP: 180/88 160/72  147/82   Pulse: 77 78  78   Resp: 21 21  24   Temp:    98.1 °F (36.7 °C)   SpO2: 97% 96%  96%   Weight:   115.6 kg (254 lb 13.6 oz)      Physical Exam   Constitutional: No distress. HENT:   Head: Atraumatic. Mouth/Throat: Oropharynx is clear and moist.   Eyes: Conjunctivae and EOM are normal. Pupils are equal, round, and reactive to light. Neck: Normal range of motion. Neck supple. Cardiovascular: Normal rate and normal heart sounds. No murmur heard. Pulmonary/Chest: No respiratory distress. She has wheezes. She has no rales. Talks in 5-6 word sentences, labored  Diminished air entry bilaterally  Wheezing on lung bases   Abdominal: Soft. She exhibits no distension. There is no tenderness. Musculoskeletal: Normal range of motion. She exhibits edema. She exhibits no tenderness. 1+ pitting edema to mid-shins bilaterally  No erythema, tenderness   Neurological: She is alert. No cranial nerve deficit. Coordination normal.   Skin: Skin is warm and dry. No rash noted. She is not diaphoretic. Psychiatric: She has a normal mood and affect.  Her behavior is normal. Judgment and thought content normal.         Diagnostic Study Results     Labs -     Recent Results (from the past 12 hour(s))   CBC WITH AUTOMATED DIFF    Collection Time: 07/06/18 11:30 PM   Result Value Ref Range    WBC 5.7 4.6 - 13.2 K/uL    RBC 4.42 4.20 - 5.30 M/uL    HGB 13.2 12.0 - 16.0 g/dL    HCT 38.4 35.0 - 45.0 %    MCV 86.9 74.0 - 97.0 FL    MCH 29.9 24.0 - 34.0 PG    MCHC 34.4 31.0 - 37.0 g/dL    RDW 13.4 11.6 - 14.5 %    PLATELET 585 697 - 268 K/uL    MPV 8.8 (L) 9.2 - 11.8 FL    NEUTROPHILS 51 40 - 73 %    LYMPHOCYTES 40 21 - 52 %    MONOCYTES 7 3 - 10 %    EOSINOPHILS 2 0 - 5 %    BASOPHILS 0 0 - 2 %    ABS. NEUTROPHILS 2.9 1.8 - 8.0 K/UL    ABS. LYMPHOCYTES 2.3 0.9 - 3.6 K/UL    ABS. MONOCYTES 0.4 0.05 - 1.2 K/UL    ABS. EOSINOPHILS 0.1 0.0 - 0.4 K/UL    ABS.  BASOPHILS 0.0 0.0 - 0.06 K/UL    DF AUTOMATED     METABOLIC PANEL, BASIC    Collection Time: 07/06/18 11:30 PM   Result Value Ref Range    Sodium 141 136 - 145 mmol/L    Potassium 3.7 3.5 - 5.5 mmol/L    Chloride 104 100 - 108 mmol/L    CO2 30 21 - 32 mmol/L    Anion gap 7 3.0 - 18 mmol/L    Glucose 212 (H) 74 - 99 mg/dL    BUN 10 7.0 - 18 MG/DL    Creatinine 0.99 0.6 - 1.3 MG/DL    BUN/Creatinine ratio 10 (L) 12 - 20      GFR est AA >60 >60 ml/min/1.73m2    GFR est non-AA 58 (L) >60 ml/min/1.73m2    Calcium 9.2 8.5 - 10.1 MG/DL   CARDIAC PANEL,(CK, CKMB & TROPONIN)    Collection Time: 07/06/18 11:30 PM   Result Value Ref Range    CK 88 26 - 192 U/L    CK - MB 1.0 <3.6 ng/ml    CK-MB Index 1.1 0.0 - 4.0 %    Troponin-I, Qt. <0.02 0.0 - 0.045 NG/ML   NT-PRO BNP    Collection Time: 07/06/18 11:30 PM   Result Value Ref Range    NT pro-BNP 37 0 - 900 PG/ML   D DIMER    Collection Time: 07/06/18 11:30 PM   Result Value Ref Range    D DIMER 0.27 <0.46 ug/ml(FEU)   EKG, 12 LEAD, INITIAL    Collection Time: 07/06/18 11:44 PM   Result Value Ref Range    Ventricular Rate 76 BPM    Atrial Rate 76 BPM    P-R Interval 150 ms    QRS Duration 88 ms    Q-T Interval 382 ms    QTC Calculation (Bezet) 429 ms    Calculated P Axis 80 degrees    Calculated R Axis 24 degrees    Calculated T Axis 64 degrees    Diagnosis       Normal sinus rhythm  Cannot rule out Anterior infarct , age undetermined  Abnormal ECG  When compared with ECG of 28-JUN-2018 06:47,  Nonspecific T wave abnormality now evident in Lateral leads     POC G3    Collection Time: 07/07/18  2:16 AM   Result Value Ref Range    Device: NASAL CANNULA      Flow rate (POC) 2 L/M    FIO2 (POC) 0.28 %    pH (POC) 7.452 (H) 7.35 - 7.45      pCO2 (POC) 46.6 (H) 35.0 - 45.0 MMHG    pO2 (POC) 110 (H) 80 - 100 MMHG    HCO3 (POC) 32.5 (H) 22 - 26 MMOL/L    sO2 (POC) 98 (H) 92 - 97 %    Base excess (POC) 7 mmol/L    Allens test (POC) YES      Site RIGHT RADIAL      Specimen type (POC) ARTERIAL      Performed by Shikha Beard        Radiologic Studies -   XR CHEST PA LAT   Final Result        CT Results  (Last 48 hours)    None        CXR Results  (Last 48 hours)               07/07/18 0000  XR CHEST PA LAT Final result    Impression:  Impression:   1. No acute process. Scarring in the right lung base. Narrative:      Examination: PA and lateral chest        History: Shortness of breath       Comparison: June 28, 2018       Findings: There is stable linear scarring in the right lung base. There is no   pleural effusion, pulmonary edema, or pneumothorax. No definite focal   consolidation is seen. Cardiomegaly is stable. Mild atherosclerosis of aortic   arch. Degenerative changes of the spine. Medical Decision Making   I am the first provider for this patient. I reviewed the vital signs, available nursing notes, past medical history, past surgical history, family history and social history. Assessment: 61 y.o F with hx of IDDM, COPD on home O2, SHERRIE, HTN presenting with 2 weeks on ongoing shortness of breath and subjective chest tightness despite home duonebs, steroid taper, and abx tx at last visit. Physical exam with some wheezing, however poor air flow.  Suspect COPD exacerbation, will treat with duonebs and solumendrol and reassess. Differential Diagnosis:  Consider Asthma, Bronchitis, Pneumonia, PTX, COPD, URI, CHF, ACS. Doubt PE in this patient as she is not taachycardic, not hypoxic from baseline. Will get d-dimer to rule out. Doubt ACS in this patient as well with similar sxs for weeks with negative workup at previous presentation and ECG without ischemic changes. ED Course/Medical Decision Makin:03 AM  Labwork grossly unremarkable. Cardiac enzymes negative. BNP wnl. D-dimer pending    12:22 AM  D-dimer negative for PE. CXR with no pleural effusion, edema, PTX, or PNA. On re-eval, pt feeling no significant improvement after duoneb and steroids. Mag going now. Will consult admitting MD for likely inpatient admission as pt continues to be symptomatic despite current tx, and I think admission would be the most prudent course for this patient. 1:33 AM  Spoke with Dr. Shlomo Davidson (Resident- [facility]- about patient, agrees to admit, assistance in patient's care greatly appreciated. 2:30 AM  Pt seen by PFM, to be admitted, orders placed by their team.       Vital Signs-Reviewed the patient's vital signs. Pulse Oximetry Analysis -98% on 2L    Cardiac Monitor:  Rate: 85  Rhythm: regular, sinus    EKG: Interpreted by the EP. Time Interpreted: 23:45   Rate: 76   Rhythm: sinus, regular   Interpretation: normal sinus rhythm   Comparison: reviewed, no significant change    Records Reviewed: Nursing Notes, Old Medical Records, Previous electrocardiograms, Previous Radiology Studies and Previous Laboratory Studies      Disposition:  Admit      Procedures:  Procedures    Core Measures:    Critical Care Time:     For Hospitalized Patients:    1. Hospitalization Decision Time:  The decision to hospitalize the patient was made by Dr. Shlomo Davidson at 02:30 AM on 2018     2.  Aspirin: Aspirin was not given because the patient did not present with a stroke at the time of their Emergency Department evaluation    Diagnosis     Clinical Impression:   1. COPD with acute bronchitis ([facility]    2. Type 2 diabetes with nephropathy ([facility]    3. Hyperlipidemia, unspecified hyperlipidemia type    4.  Diastolic CHF, chronic (HCC)        _______________________________

## 2018-07-07 NOTE — H&P
Admission History and Physical  [facility]      Patient: Odalys Mancilla MRN: [id number]  [facility]: [id number]    YOB: 1960  Age: 61 y.o. Sex: female      DOA: 7/6/2018       HPI:     Odalys Mancilla is a 61 y.o. female with PMH COPD GOLD class B, SHERRIE, Asthma, HTN, IDDM, GERD, now presenting with complaint of SOB. Pt endorses SOB, chest tightness, struggle to catch her breath, similar to previous COPD exacerbations. Denies cough, sputum, recent illness. She has been treated twice outpt in the past two weeks and admitted 2x this year. On 6/28 she was treated in ED for COPD exacerbation with Levaquin 750mg every day for 5 days and prednisone 50mg every day for 3 days both of which she completed. She was then seen in clinic 7/5 with SOB, chest tightness and treated with Prednisone 20mg every day for 5 days which she started yesterday. She is currently on home LABA, LAMA, Inhaled Steroids, and Montelukast with duonebs PRN. Her duonebs have not helped with this exacerbation. She is on O2 at home and uses CPAP nightly, however has been unable to tolerate CPAP with this exacerbation. Pt states she has been seeing cardiologist, Dr. Gilberto Olmstead, for evaluation of CHF. She currently has a Cardiac Event Monitor and is scheduled for outpt ECHO. Last ECHO was 2016 with 60% EF. Last A1C 7.7 in March    ED Course: CBC, CMP, Gluc, D-dimer, ABG, Tn, BNP, EKG, CXR  Duoneb, Steroids, Mag with minimal improvement    Review of Systems  Constitutional: Negative for fever, chills and diaphoresis. HENT: Negative for hearing loss and sore throat. Eyes: Negative for blurred vision and double vision. Respiratory: Negative for cough and hemoptysis. Cardiovascular: Negative for palpitations. Gastrointestinal: Negative for nausea and vomiting. Genitourinary: Negative for dysuria and hematuria. Musculoskeletal: Negative for myalgias and joint pain.   Skin: Negative for itching and rash. Neurological: Negative for dizziness. Endorses HA with albuterol use.       Past Medical History:   Diagnosis Date    Asthma     COPD     Cystocele, midline     Diabetes mellitus (HCC)     GERD (gastroesophageal reflux disease)     Hidradenitis suppurativa     Hyperlipidemia     Hypertension     SHERRIE on CPAP     CPAP    Stress incontinence        Past Surgical History:   Procedure Laterality Date    BREAST SURGERY PROCEDURE UNLISTED      Right breast benign tumor removal    HX APPENDECTOMY      HX CHOLECYSTECTOMY      HX DILATION AND CURETTAGE      Dysfunctional uterine bleeding, thought 2/2 fibroids    HX TUBAL LIGATION         Family History   Problem Relation Age of Onset    Hypertension Mother     Stroke Mother     Breast Cancer Mother      Bilateral mastectomies    Cancer Mother      ovarian and breast    Heart Failure Mother     Heart Attack Father      2011    Heart Surgery Father      CABG    Heart Failure Father     COPD Sister      Heavy smoker    Cancer Sister      ovarian    Heart Failure Sister     Lung Disease Sister     Asthma Child     Cancer Maternal Aunt      pancreatic    Cancer Maternal Grandfather      stomach       Social History     Social History    Marital status: LEGALLY      Spouse name: N/A    Number of children: N/A    Years of education: N/A     Social History Main Topics    Smoking status: Former Smoker     Packs/day: 1.00     Years: 30.00     Types: Cigarettes     Start date: 5/6/1966     Quit date: 9/6/2006    Smokeless tobacco: Never Used      Comment: 1-1.5 packs per day    Alcohol use No    Drug use: No    Sexual activity: No     Other Topics Concern    None     Social History Narrative       Allergies   Allergen Reactions    Ancef [Cefazolin] Hives    Contrast Agent [Iodine] Anaphylaxis, Shortness of Breath and Swelling     Needs pre-medication for IV contrast with Benadryl, Solu-Medrol    Fish Containing Products Anaphylaxis     Pt states she had a severe allergic reaction at 7 y/o.  Metformin Other (comments)     Abdominal pain, diarrhea.  Codeine Other (comments)     Altered mental status       Prior to Admission Medications   Prescriptions Last Dose Informant Patient Reported? Taking? Diabetic Supplies, [address] [facility] [place] 2018 at Unknown time  No Yes   Sig: Diabetic syringes 1 mL - use one daily   Diabetic Supplies, Miscellan. misc 2018 at Unknown time  No Yes   Sig: Diabetic needles 1/2 inch 31 gauge - 1 injection daily   Diabetic Supplies, Miscellan. misc 2018 at Unknown time  No Yes   Sig: Diabetic test strips - use four times daily   Diabetic Supplies, Miscellan. misc   No No   Sig: Diabetic lancets - use three times daily   Inhalational Spacing Device (AEROCHAMBER)   No No   Si Each by Does Not Apply route as needed for Cough or Other (COPD exacerbation). OXYGEN-AIR DELIVERY SYSTEMS   Yes No   Si L by Nasal route continuous. [employer]   albuterol (PROVENTIL HFA, VENTOLIN HFA, PROAIR HFA) 90 mcg/actuation inhaler 2018 at Unknown time  No Yes   Sig: Take 2 Puffs by inhalation every four (4) hours as needed for Wheezing. For the next 2 days after hospital discharge, use your inhaler 4 times a day. albuterol (PROVENTIL VENTOLIN) 2.5 mg /3 mL (0.083 %) nebulizer solution 2018 at Unknown time  No Yes   Sig: 3 mL by Nebulization route every four (4) hours as needed for Wheezing. Indications: BRONCHOSPASM PREVENTION, Chronic Obstructive Pulmonary Disease   aspirin delayed-release 81 mg tablet Not Taking at Unknown time  Yes No   Sig: Take 81 mg by mouth daily. cpap machine kit 2018 at Unknown time  Yes Yes   Sig: by Does Not Apply route nightly. famotidine (PEPCID) 20 mg tablet   Yes No   Sig: Take 20 mg by mouth two (2) times daily as needed. fluticasone (FLONASE) 50 mcg/actuation nasal spray   Yes No   Si Sprays by Both Nostrils route daily.      fluticasone-salmeterol (ADVAIR HFA) 612-41 mcg/actuation inhaler   Yes No   Sig: Take 2 Puffs by inhalation two (2) times a day. insulin NPH/insulin regular (NOVOLIN 70/30 U-100 INSULIN) 100 unit/mL (70-30) injection   Yes No   Sig: by SubCUTAneous route. 8 units if blood sugar is 200-300 over 300 12 units   insulin glargine (LANTUS,BASAGLAR) 100 unit/mL (3 mL) inpn   No No   Si Units by SubCUTAneous route nightly. Indications: type 2 diabetes mellitus   ipratropium (ATROVENT) 0.02 % soln   No No   Si.5 mL by Nebulization route four (4) times daily as needed. Indications: BRONCHOSPASM PREVENTION WITH COPD   lisinopril (PRINIVIL, ZESTRIL) 40 mg tablet   Yes No   Sig: Take 40 mg by mouth daily. Indications: hypertension   montelukast (SINGULAIR) 10 mg tablet   Yes No   Sig: Take 10 mg by mouth nightly. omeprazole (PRILOSEC) 40 mg capsule   Yes No   Sig: Take 40 mg by mouth daily. Indications: gastroesophageal reflux disease   simethicone (MYLICON) 80 mg chewable tablet   Yes No   Sig: Take 80 mg by mouth every six (6) hours as needed for Flatulence. umeclidinium (INCRUSE ELLIPTA) 62.5 mcg/actuation inhaler   No No   Sig: Take 1 Puff by inhalation daily. Indications: BRONCHOSPASM PREVENTION WITH COPD      Facility-Administered Medications: None       Physical Exam:     Patient Vitals for the past 24 hrs:   Temp Pulse Resp BP SpO2   18 0200 - 76 23 - 98 %   18 0145 - 77 22 184/90 98 %   18 0130 - 73 19 171/85 98 %   18 0115 - 76 20 153/78 97 %   18 0100 - 77 23 146/73 97 %   18 0045 - 78 23 163/69 97 %   18 0030 - 80 18 167/79 98 %   18 0015 - 76 19 169/75 98 %   18 2345 - 83 18 186/89 98 %   18 2330 98.4 °F (36.9 °C) 83 19 175/74 98 %   18 25 (!) 216/100 97 %   18 26 - 96 %       Physical Exam:  General:  Alert and Responsive and in moderate distress. HEENT: Conjunctiva pink, sclera anicteric. EOMI. Pharynx moist, nonerythematous. Moist mucous membranes. Thyroid not enlarged, no nodules. No cervical, supraclavicular, occipital or submandibular lymphadenopathy. No other gross abnormalities appreciated. CV:  RRR w/o MRGs. No visible pulsations or thrills. No JVD  RESP:  labored breathing. Lungs sounds distant. Faint expiratory wheezes throughout. No rales or rhonchi. Equal expansion bilaterally. ABD:  Soft, nontender, nondistended. Normoactive bowel sounds. No hepatosplenomegaly. No suprapubic tenderness. Umbilical hernia  MS:  No joint deformity or instability. No atrophy. Neuro:  Cranial nerves grossly intact, grossly moving upper and lower extremities. Ext:  No edema. 2+ radial and dp pulses bilaterally. Skin:  No rashes, lesions, or ulcers. Good turgor. IMAGING: Xr Chest Pa Lat    Result Date: 7/7/2018  Impression: 1. No acute process. Scarring in the right lung base. Recent Results (from the past 12 hour(s))   CBC WITH AUTOMATED DIFF    Collection Time: 07/06/18 11:30 PM   Result Value Ref Range    WBC 5.7 4.6 - 13.2 K/uL    RBC 4.42 4.20 - 5.30 M/uL    HGB 13.2 12.0 - 16.0 g/dL    HCT 38.4 35.0 - 45.0 %    MCV 86.9 74.0 - 97.0 FL    MCH 29.9 24.0 - 34.0 PG    MCHC 34.4 31.0 - 37.0 g/dL    RDW 13.4 11.6 - 14.5 %    PLATELET 468 052 - 650 K/uL    MPV 8.8 (L) 9.2 - 11.8 FL    NEUTROPHILS 51 40 - 73 %    LYMPHOCYTES 40 21 - 52 %    MONOCYTES 7 3 - 10 %    EOSINOPHILS 2 0 - 5 %    BASOPHILS 0 0 - 2 %    ABS. NEUTROPHILS 2.9 1.8 - 8.0 K/UL    ABS. LYMPHOCYTES 2.3 0.9 - 3.6 K/UL    ABS. MONOCYTES 0.4 0.05 - 1.2 K/UL    ABS. EOSINOPHILS 0.1 0.0 - 0.4 K/UL    ABS.  BASOPHILS 0.0 0.0 - 0.06 K/UL    DF AUTOMATED     METABOLIC PANEL, BASIC    Collection Time: 07/06/18 11:30 PM   Result Value Ref Range    Sodium 141 136 - 145 mmol/L    Potassium 3.7 3.5 - 5.5 mmol/L    Chloride 104 100 - 108 mmol/L    CO2 30 21 - 32 mmol/L    Anion gap 7 3.0 - 18 mmol/L    Glucose 212 (H) 74 - 99 mg/dL    BUN 10 7.0 - 18 MG/DL    Creatinine 0.99 0.6 - 1.3 MG/DL    BUN/Creatinine ratio 10 (L) 12 - 20      GFR est AA >60 >60 ml/min/1.73m2    GFR est non-AA 58 (L) >60 ml/min/1.73m2    Calcium 9.2 8.5 - 10.1 MG/DL   CARDIAC PANEL,(CK, CKMB & TROPONIN)    Collection Time: 07/06/18 11:30 PM   Result Value Ref Range    CK 88 26 - 192 U/L    CK - MB 1.0 <3.6 ng/ml    CK-MB Index 1.1 0.0 - 4.0 %    Troponin-I, Qt. <0.02 0.0 - 0.045 NG/ML   NT-PRO BNP    Collection Time: 07/06/18 11:30 PM   Result Value Ref Range    NT pro-BNP 37 0 - 900 PG/ML   D DIMER    Collection Time: 07/06/18 11:30 PM   Result Value Ref Range    D DIMER 0.27 <0.46 ug/ml(FEU)   POC G3    Collection Time: 07/07/18  2:16 AM   Result Value Ref Range    Device: NASAL CANNULA      Flow rate (POC) 2 L/M    FIO2 (POC) 0.28 %    pH (POC) 7.452 (H) 7.35 - 7.45      pCO2 (POC) 46.6 (H) 35.0 - 45.0 MMHG    pO2 (POC) 110 (H) 80 - 100 MMHG    HCO3 (POC) 32.5 (H) 22 - 26 MMOL/L    sO2 (POC) 98 (H) 92 - 97 %    Base excess (POC) 7 mmol/L    Allens test (POC) YES      Site RIGHT RADIAL      Specimen type (POC) ARTERIAL      Performed by Conrado Taveras          Assessment/Plan:   61 y.o. female with PMH PMH COPD, SHERRIE, Asthma, HTN, IDDM, GERD, now presenting with acute COPD exaxerbation. Acute Exacerbation of COPD w/ Chronic Hypoxic Respiratory Failure - COPD vs CHF vs PE vs Pulm Htn vs Pneumonia. Did not PERC out, but Wells score <2 making DVT unlikely along with neg d-dimer makes PE unlikely. Given pt hx, unremarkable Physical Exam, and previous EF of 60% unlikely CHF with BNP of 37. Pt is followed by Dr. Ronnell Hamlin of Pulmonology; this is her 3rd admission this year with multiple visits to ED. On previous admission in March Dr. Toney Anand staged her COPD as GOLD Class B. However, she is now on her 3rd admission and has [place] [facility] [place] LABA, LAMA, Inhaled Steroids, and occasional ABX. She was most recently treated with a course of 5 day Levaquin ending 7/2 and admitted recently.   With recent abx use and hospitalizations she is at risk for pseudomonas. - Continue O2 target sats 88-92%  - Brovana and Pulmicort subsituted for Advair  - Hold anticholinurgics  - Continue home Montelukast 10 mg daily  - Continue home Flonase PRN  - Duo-Nebs as per RT  - Incentive spirometry, Bronchial Hygiene  - Mg in ED  - Prednisone 40 mg qd for 5 days as per GOLD criteria recommendations  - Azithromycin X 5 days  - Zosyn as per GOLD criteria recommendations for Pseudomonas risks and Ancef allergy  - Will call Pulmonology and make aware  - PT, OT, Case Mgmt Consult     Type II DM- controlled as per last HbA1c 7.7 in 3/2018; home insulin regimen as above. Will likely climb with steroids.  - Continue home Lantus 25U nightly  - SSI (high sensitivity) w/Accuchecks ACHS  - Check hemoglobin A1c      HTN, Hx diastolic CHF- BP not well controlled; Last TTE showed EF 60%. As per pt, Dr. Ketty Gracia has scheduled another ECHO  - Restart home ASA 81 mg daily  - Continue Lisinopril 20mg BID  - ECHO    SHERRIE  - CPAP per RT as tolerated      GERD  - Continue home Protonix 40 mg daily    Diet: Diabetic  DVT Prophylaxis: Lovenox 40mg  Code Status: Full  Point of Contact: Christine Rodrigues (daughter) [phone]    Disposition and anticipated LOS: [facility], , PGY I  [facility]  07/07/18  2:47 AM                Senior Resident History and Physical  [facility]    HPI:     Subha Hernandes is a 61 y.o. female with a PMHx of COPD, Asthma, HTN, Type II DM, and GERD who came into the ED with complaints of shortness of breath. Ms. Lisandra Kendrick has a history of COPD with frequent exacerbations and has baseline shortness of breath with any activity. She wears O2 at 2-3 LPM at home with any exertion and uses CPAP nightly. Her baseline SOB has become increasingly worse over the past few days despite increased breathing treatments, steroids and antibiotics.  She has had 2 admissions since 1/2018 and has had multiple recent ED visits. Most recently on 6/28/18, she was seen in the ED, treated with multiple duo-neb treatments along with Mag sulfate, and given a 5 day course of Levaquin and 3 days of Prednisone. Earlier this week her pulmonologist appointment had to be rescheduled, after which she was seen in the PFM clinic where she was prescribed a prolonged Prednisone taper. In the ED, work-up included a reassuring CBC, BMP, cardiac panel, EKG, CXR and ABG. She was treated with 2 Duo-Nebs, Mag sulfate, and Solu-medrol with only minimal relief of her symptoms. Ms. Thor Kirby is now admitted with complaint of shortness of breath 2/2 COPD exacerbation. ROS, PMH, PSH, Family Hx, Social Hx, home medications, and allergies as above in intern H&P. I agree with history as above. Physical Exam:     Visit Vitals    /85    Pulse 73    Temp 98.4 °F (36.9 °C)    Resp 19    SpO2 98%       General:  WD, WN, mild-moderate distress   HEENT:  NCAT. Conjunctiva pink, sclera anicteric. EOMI. Pharynx moist, nonerythematous. Moist mucous membranes. No cervical, supraclavicular, or submandibular lymphadenopathy. CV:  RRR, no mumurs. RESP:  Labored breathing, speaks in 5-6 word sentences, significantly diminished sounds bilaterally, faint scattered wheezes   ABD: Soft, nontender, nondistended. Normoactive bowel sounds. No hepatosplenomegaly. No suprapubic tenderness. MS:  No joint deformity or instability. No atrophy. Neuro:  5/5 strength bilateral upper extremities and lower extremities. A+Ox3. Ext: No edema. 2+ radial and dp pulses bilaterally. Skin:  No rashes, lesions, or ulcers. Good turgor. Imaging    Xr Chest Pa Lat    Result Date: 7/7/2018  Impression: 1. No acute process. Scarring in the right lung base.        Assessment/Plan:     Namita Pickett is a 61 y.o. female with a PMHx of COPD, Asthma, HTN, Type II DM, and GERD, who is now admitted with an acute exacerbation of COPD.     Acute Exacerbation of COPD w/Chronic Hypoxic Respiratory Failure- DDx of COPD exacerbation vs CAP vs CHF vs PE vs worsening pulm HTN; Followed by Dr. Bobbi Lee of Pulmonology; Has moderate to severe COPD, GOLD Class B in 3/2018, on home O2 at 2 LPM with any activity; last admission in 3/2018; low dose CT Chest 11/2016 showed 0.4 cm ground glass density, read as benign, rec'd 12 mo f/u; Pt seen in ED on 6/28/18 and given Levaquin X5 days, Prednisone 50 X3 days; seen in PFM clinic 1.5 days ago and prescribed Prednisone prolonged taper of 20 days; Solu-medrol and Mag sulfate in ED; D-dimer 0.27; pro-BNP 37, ABG 7.452/46.6/110/32.5 on 3 LPM in ED   - Consult pulmonology in am   - Continue home O2 to keep SpO2 88-92%   - Substitute Brovanna and Pulmicort for home Advair 2 puffs BID  - Will hold home Incruse Ellipta while having acute exacerbation   - Continue home Montelukast 10 mg daily   - Continue home Flonase PRN   - Duo-Nebs q2hrs per RT   - Bronchial hygiene, Incentive spirometry   - PT, OT, CM   - Prednisone 40 mg daily starting in am for 5 days  - Azithromycin 500 mg today, 250 mg X4 days     - Zosyn 4.5 g q6hrs (pt has Pseudomonas risk; allergy to cephalosporins, states she is not allergic to PCN's; just finished course of Levaquin)      Type II DM- Last HbA1c 7.7 in 3/2018; Pt previously on Metformin but unable to tolerate due to diarrhea and possible lactic acidosis that developed   - Continue home Lantus 25 units nightly   - SSI (high sensitivity) w/Accuchecks ACHS   - Check HbA1c      HTN, Hx diastolic CHF- BP Not well controlled as outpatient; Last TTE 9/2016 showed EF 60% with G1DD; pt has had appointment with cardiologist Dr. Marycruz Schmitt lately, reportedly wanted to get a new TTE; pt is currently wearing event recorder    - TTE ordered and pending    - Continue home ASA 81 mg daily, Lisinopril 20 mg qam, 20 mg qpm       SHERRIE- Followed by Dr. Pawan Mahan, pulmonology;  On CPAP nightly at home, cannot tolerate when having an exacerbation   - CPAP per RT       GERD  - Continue home Protonix 40 mg daily   - Home Pepcid 20 mg BID PRN         *Please see intern note above for additional chronic disease mgmt.     DO GOGO SalasMS-[facility]   7/7/2018

## 2018-07-07 NOTE — ED NOTES
TRANSFER - OUT REPORT:    Verbal report given to [facility] DR THANIA BRANHAM RN(name) on Nelida Locus  being transferred to (unit) for routine progression of care   Report consisted of patients Situation, Background, Assessment and Recommendations(SBAR). Information from the following report(s) SBAR, ED Summary, Procedure Summary, Intake/Output, MAR and Recent Results was reviewed with the receiving nurse. Opportunity for questions and clarification was provided.       Patient transported with: O2 @ 2 liters

## 2018-07-07 NOTE — PROGRESS NOTES
Problem: Mobility Impaired (Adult and Pediatric)  Goal: *Acute Goals and Plan of Care (Insert Text)  Physical Therapy Goals  Initiated 7/7/2018 and to be accomplished within 7 day(s)  1. Patient will move from supine to sit and sit to supine  in bed with modified independence. 2.  Patient will transfer from bed to chair and chair to bed with modified independence using the least restrictive device. 3.  Patient will perform sit to stand with modified independence. 4.  Patient will ambulate with modified independence for 50 feet with the least restrictive device. 5.  Patient will ascend/descend 2 stairs with 1 handrail(s) with minimal assistance/contact guard assist.  physical Therapy EVALUATION    Patient: Namita Pickett (43 y.o. female)  Date: 7/7/2018  Primary Diagnosis: COPD exacerbation ([facility] [address]        Precautions: fall, O2       ASSESSMENT :  Based on the objective data described below, the patient presents with decreased strength, balance and activity tolerance resulting in decreased independence with functional mobility. Pt was sitting up in recliner on arrival.  Pt stood from her recliner with CGA and ambulated 25 feet with RW and SBA. Pt required a several minute seated rest break prior to ambulated the last 5 feet back to the recliner. Pt was on 2L of O2 throughout evaluation. Pt was left in the recliner with needs in reach. Patient will benefit from skilled intervention to address the above impairments.   Patients rehabilitation potential is considered to be Fair  Factors which may influence rehabilitation potential include:   []         None noted  []         Mental ability/status  [x]         Medical condition  []         Home/family situation and support systems  []         Safety awareness  []         Pain tolerance/management  []         Other:      PLAN :  Recommendations and Planned Interventions:  [x]           Bed Mobility Training             [x]    Neuromuscular Re-Education  [x]           Transfer Training                   []    Orthotic/Prosthetic Training  [x]           Gait Training                          []    Modalities  [x]           Therapeutic Exercises          []    Edema Management/Control  [x]           Therapeutic Activities            [x]    Patient and Family Training/Education  []           Other (comment):    Frequency/Duration: Patient will be followed by physical therapy 1-2 times per day/4-7 days per week to address goals. Discharge Recommendations: Home Health  Further Equipment Recommendations for Discharge: rolling walker     G-CODES      Mobility  Current  CJ= 20-39%   Goal  CI= 1-19%. The severity rating is based on the Level of Assistance required for Functional Mobility and ADLs. Eval Complexity: History: MEDIUM  Complexity : 1-2 comorbidities / personal factors will impact the outcome/ POC Exam:LOW Complexity : 1-2 Standardized tests and measures addressing body structure, function, activity limitation and / or participation in recreation  Presentation: MEDIUM Complexity : Evolving with changing characteristics  Clinical Decision Making:Low Complexity , Overall Complexity:LOW     SUBJECTIVE:   Patient stated I have been going downhill lately.     OBJECTIVE DATA SUMMARY:     Past Medical History:   Diagnosis Date    Asthma     COPD     Cystocele, midline     Diabetes mellitus ([facility] [address]     GERD (gastroesophageal reflux disease)     Hidradenitis suppurativa     Hyperlipidemia     Hypertension     SHERRIE on CPAP     CPAP    Stress incontinence      Past Surgical History:   Procedure Laterality Date    BREAST SURGERY PROCEDURE UNLISTED      Right breast benign tumor removal    HX APPENDECTOMY      HX CHOLECYSTECTOMY      HX DILATION AND CURETTAGE      Dysfunctional uterine bleeding, thought 2/2 fibroids    HX TUBAL LIGATION       Prior Level of Function/Home Situation: ambulated short distances in home without an assistive device, lives on first floor of a 2 story home, lives with daughter  Home Situation  Home Environment: Private residence  One/Two Story Residence: Two story  # of Interior Steps: [address]: Right  Living Alone: No  Support Systems: Family member(s)  Patient Expects to be Discharged to[de-identified] Private residence  Current DME Used/Available at Home: Commode, bedside, CPAP, Oxygen, portable, Walker, Wheelchair  Critical Behavior:   calm and cooperative      Strength:    Strength: Generally decreased, functional (B LEs)   Tone & Sensation:   Tone: Normal (B LEs)   Range Of Motion:  AROM: Within functional limits (B LEs)   Functional Mobility:  Transfers:  Sit to Stand: Stand-by assistance  Stand to Sit: Stand-by assistance  Balance:   Sitting: Intact  Standing: With support  Standing - Static: Good  Standing - Dynamic : Fair  Ambulation/Gait Training:  Distance (ft): 25 Feet (ft) (5)  Assistive Device: Walker, rolling  Ambulation - Level of Assistance: Stand-by assistance  Gait Abnormalities: Decreased step clearance      Pain:  Pt reports 0/10 pain or discomfort prior to treatment.    Pt reports 0/10 pain or discomfort post treatment. Activity Tolerance:   poor  Please refer to the flowsheet for vital signs taken during this treatment. After treatment:   [x]         Patient left in no apparent distress sitting up in chair  []         Patient left in no apparent distress in bed  [x]         Call bell left within reach  [x]         Nursing notified  []         Caregiver present  []         Bed alarm activated    COMMUNICATION/EDUCATION:   [x]         Fall prevention education was provided and the patient/caregiver indicated understanding. [x]         Patient/family have participated as able in goal setting and plan of care. [x]         Patient/family agree to work toward stated goals and plan of care. []         Patient understands intent and goals of therapy, but is neutral about his/her participation.   [] Patient is unable to participate in goal setting and plan of care.   Educated patient on activity pacing    Thank you for this referral.  Faiza Townsend, PT   Time Calculation: 13 mins

## 2018-07-07 NOTE — ROUTINE PROCESS
TRANSFER - IN REPORT:    Verbal report received from HAYDEN Florian RN(name) on Brianne Storm  being received from ED (unit) for routine progression of care      Report consisted of patients Situation, Background, Assessment and   Recommendations(SBAR). Information from the following report(s) SBAR, Kardex and MAR was reviewed with the receiving nurse. Opportunity for questions and clarification was provided. Assessment completed upon patients arrival to unit and care assumed.

## 2018-07-07 NOTE — PROGRESS NOTES
Problem: Falls - Risk of  Goal: *Absence of Falls  Document Ricarda Fall Risk and appropriate interventions in the flowsheet.    Outcome: Progressing Towards Goal  Fall Risk Interventions:            Medication Interventions: Evaluate medications/consider consulting pharmacy, Patient to call before getting OOB, Teach patient to arise slowly    Elimination Interventions: Call light in reach, Patient to call for help with toileting needs, Toileting schedule/hourly rounds

## 2018-07-07 NOTE — CONSULTS
[facility]  Pulmonary, Critical Care, and Sleep Medicine    Name: Fred Long MRN: [id number]   : 1960 Hospital: [facility]   Date: 2018        Pulmonary Initial In-Patient Consult                                              Consult requesting physician: Dr. Sherita Scheuermann  Reason for Consult: COPD exacerbation    IMPRESSION:   · Severe COPD with significant asthma overlap now with acute exacerbation failed outpatient therapy with oral steroids. No evidence for infection and has completed course of appropriate antibiotics. · History of asthma  · GOLD stage 3D COPD (high symptoms and increased frequency of exacerbations) with home O2 at 2L/min. Last christiano in 2016 showed FEV1 (post BD) 51% with signficant BD response. · Mild SHERRIE with AHI = 12 currently prescribed CPAP 9 cm H20 with EPR = 2  · Obesity  · DM  · GERD  · HTN      RECOMMENDATIONS:   · Recommend stepping up steroid therapy to IV solumedrol given degree of bronchial constriction on exam and no improvement on oral steroids. · Continue scheduled duonebs in lieu of long acting anticholinergic   · Start nebulized LABA - brovana and nebulized steroids: pulmicort. · Discontinue antibiotics - no evidence of infection and had previously completed course during this COPD exacerbation. · Recommend transition CPAP to BIPAP to improve patient tolerance and help with work of breathing. Advise BIPAP 14/9 cm H20   · Wean supplemental O2 to 2 L/min as tolerated to keep O2 sats > 89%  · Nutrition: per primary team  · Replace electrolytes  · HOB >=30 degree, aggressive pulmonary toileting, incentive spirometry, PT/OT eval and treat  · GI Prophylaxis: per primary team: continue outpatient PPI  · DVT Prophylaxis: per primary team :on lovenox  · Further recommendations will be based on the patient's response to recommended treatment and results of the investigation ordered.      Subjective/History:     Cinderella Coad Luís Burrell is a 61 y.o. female with PMHx significant for COPD/asthma overlap, mild SHERRIE on CPAP therapy, obesity who has been admitted to [facility] for failed outpatient therapy for COPD exacerbation. Patient has moderate to severe COPD with frequent exacerbations and a significant asthma component as she had dramatic bronchodilator response with her last PFTs who initially presented 28JUN this year to ED with symptoms consistent with exacerbation and was sent home with 3 day course of prednisone, course of abx and follow-up plan with her pulmonologist, Dr. Pj Duncan. Her pulmonary appointment was cancelled because of provider sickness and she was seen 2 days ago by [facility] who gave more prednisone and instructed her to use duoneb every 6 hours. She continued to struggle with severe chest tightness and dyspnea prompting ED presentation last night. Denies fever, chills, night sweats. Not using CPAP recently due to difficulty breathing. Review of Systems:  Review of systems not obtained due to patient factors. Allergies   Allergen Reactions    Ancef [Cefazolin] Hives    Contrast Agent [Iodine] Anaphylaxis, Shortness of Breath and Swelling     Needs pre-medication for IV contrast with Benadryl, Solu-Medrol    Fish Containing Products Anaphylaxis     Pt states she had a severe allergic reaction at 7 y/o.  Metformin Other (comments)     Abdominal pain, diarrhea.     Codeine Other (comments)     Altered mental status        Past Medical History:   Diagnosis Date    Asthma     COPD     Cystocele, midline     Diabetes mellitus ([facility] [address]     GERD (gastroesophageal reflux disease)     Hidradenitis suppurativa     Hyperlipidemia     Hypertension     SHERRIE on CPAP     CPAP    Stress incontinence         Past Surgical History:   Procedure Laterality Date    BREAST SURGERY PROCEDURE UNLISTED      Right breast benign tumor removal    HX APPENDECTOMY      HX CHOLECYSTECTOMY      HX DILATION AND CURETTAGE Dysfunctional uterine bleeding, thought 2/2 fibroids    HX TUBAL LIGATION          Family History   Problem Relation Age of Onset    Hypertension Mother     Stroke Mother     Breast Cancer Mother      Bilateral mastectomies    Cancer Mother      ovarian and breast    Heart Failure Mother     Heart Attack Father      2011    Heart Surgery Father      CABG    Heart Failure Father     COPD Sister      Heavy smoker    Cancer Sister      ovarian    Heart Failure Sister     Lung Disease Sister     Asthma Child     Cancer Maternal Aunt      pancreatic    Cancer Maternal Grandfather      stomach        Social History   Substance Use Topics    Smoking status: Former Smoker     Packs/day: 1.00     Years: 30.00     Types: Cigarettes     Start date: 5/6/1966     Quit date: 9/6/2006    Smokeless tobacco: Never Used      Comment: 1-1.5 packs per day    Alcohol use No        Prior to Admission medications    Medication Sig Start Date End Date Taking? Authorizing Provider   insulin NPH/insulin regular (NOVOLIN 70/30 U-100 INSULIN) 100 unit/mL (70-30) injection by SubCUTAneous route. 8 units if blood sugar is 200-300 over 300 12 units   Yes Historical Provider   insulin glargine (LANTUS,BASAGLAR) 100 unit/mL (3 mL) inpn 28 Units by SubCUTAneous route nightly. Indications: type 2 diabetes mellitus  Patient taking differently: 25 Units by SubCUTAneous route nightly. Indications: type 2 diabetes mellitus 3/15/18  Yes Gamaliel Aranda MD   umeclidinium (INCRUSE ELLIPTA) 62.5 mcg/actuation inhaler Take 1 Puff by inhalation daily. Indications: BRONCHOSPASM PREVENTION WITH COPD 3/15/18  Yes Gamaliel Aranda MD   Diabetic Supplies, [place] misc Diabetic needles 1/2 inch 31 gauge - 1 injection daily 3/15/18  Yes Gamaliel Aranda MD   Diabetic Supplies, [place] misc Diabetic test strips - use four times daily 3/15/18  Yes Gamaliel Aranda MD   Diabetic Supplies, [place]  misc Diabetic lancets - use three times daily 3/15/18  Yes Micky Joy MD   ipratropium (ATROVENT) 0.02 % soln 2.5 mL by Nebulization route four (4) times daily as needed. Indications: BRONCHOSPASM PREVENTION WITH COPD 3/15/18  Yes Micky Joy MD   albuterol (PROVENTIL VENTOLIN) 2.5 mg /3 mL (0.083 %) nebulizer solution 3 mL by Nebulization route every four (4) hours as needed for Wheezing. Indications: BRONCHOSPASM PREVENTION, Chronic Obstructive Pulmonary Disease 3/15/18  Yes Micky Joy MD   omeprazole (PRILOSEC) 40 mg capsule Take 40 mg by mouth daily. Indications: gastroesophageal reflux disease   Yes Historical Provider   lisinopril (PRINIVIL, ZESTRIL) 40 mg tablet Take 20 mg by mouth two (2) times a day. Indications: hypertension   Yes Historical Provider   simethicone (MYLICON) 80 mg chewable tablet Take 80 mg by mouth every six (6) hours as needed for Flatulence. Yes Historical Provider   cpap machine kit by Does Not Apply route nightly. Yes Historical Provider   OXYGEN-AIR DELIVERY SYSTEMS 2 L by Nasal route continuous. First Choice   Yes Historical Provider   fluticasone-salmeterol (ADVAIR HFA) 330-40 mcg/actuation inhaler Take 2 Puffs by inhalation two (2) times a day. Yes Historical Provider   Diabetic [employer]. misc Diabetic syringes 1 mL - use one daily 2/14/17  Yes Christine Patel, DO   albuterol (PROVENTIL HFA, VENTOLIN HFA, PROAIR HFA) 90 mcg/actuation inhaler Take 2 Puffs by inhalation every four (4) hours as needed for Wheezing. For the next 2 days after hospital discharge, use your inhaler 4 times a day. 3/9/16  Yes Yary Gan MD   Inhalational Spacing Device (AEROCHAMBER) 1 Each by Does Not Apply route as needed for Cough or Other (COPD exacerbation). 10/2/12  Yes Danitza South, DO   fluticasone (FLONASE) 50 mcg/actuation nasal spray 2 Sprays by Both Nostrils route daily. Yes Historical Provider   montelukast (SINGULAIR) 10 mg tablet Take 10 mg by mouth nightly.    Yes Lauren Allison MD   aspirin delayed-release 81 mg tablet Take 81 mg by mouth daily. Historical Provider   famotidine (PEPCID) 20 mg tablet Take 20 mg by mouth two (2) times daily as needed.     Historical Provider       Current Facility-Administered Medications   Medication Dose Route Frequency    acetaminophen (TYLENOL) tablet 650 mg  650 mg Oral Q4H PRN    enoxaparin (LOVENOX) injection 40 mg  40 mg SubCUTAneous Q24H    montelukast (SINGULAIR) tablet 10 mg  10 mg Oral QHS    fluticasone (FLONASE) 50 mcg/actuation nasal spray 2 Spray  2 Spray Both Nostrils DAILY    aspirin delayed-release tablet 81 mg  81 mg Oral DAILY    lisinopril (PRINIVIL, ZESTRIL) tablet 20 mg  20 mg Oral BID    simethicone (MYLICON) tablet 80 mg  80 mg Oral Q6H PRN    pantoprazole (PROTONIX) tablet 40 mg  40 mg Oral ACB    insulin glargine (LANTUS) injection 25 Units  25 Units SubCUTAneous QHS    insulin lispro (HUMALOG) injection   SubCUTAneous AC&HS    glucose chewable tablet 16 g  4 Tab Oral PRN    glucagon (GLUCAGEN) injection 1 mg  1 mg IntraMUSCular PRN    dextrose (D50W) injection syrg 12.5-25 g  25-50 mL IntraVENous PRN    albuterol (PROVENTIL VENTOLIN) nebulizer solution 2.5 mg  2.5 mg Nebulization Q4H PRN    albuterol-ipratropium (DUO-NEB) 2.5 MG-0.5 MG/3 ML  3 mL Nebulization Q4H RT    methylPREDNISolone (PF) (SOLU-MEDROL) injection 125 mg  125 mg IntraVENous Q12H    arformoterol (BROVANA) neb solution 15 mcg  15 mcg Nebulization BID RT    budesonide (PULMICORT) 500 mcg/2 ml nebulizer suspension  500 mcg Nebulization BID RT         Objective:   Vital Signs:    Visit Vitals    /73 (BP 1 Location: Left arm, BP Patient Position: At rest)    Pulse 92    Temp 97.9 °F (36.6 °C)    Resp 18    Ht 5' 3\" (1.6 m)    Wt 115.2 kg (254 lb)    SpO2 95%    BMI 44.99 kg/m2       O2 Device: Nasal cannula   O2 Flow Rate (L/min): 3 l/min   Temp (24hrs), Av °F (36.7 °C), Min:97.7 °F (36.5 °C), Max:98.4 °F (36.9 °C)       Intake/Output:   Last shift:      07/07 0701 - 07/07 1900  In: 990 [P.O.:990]  Out: 300 [Urine:300]  Last 3 shifts:      Intake/Output Summary (Last 24 hours) at 07/07/18 1345  Last data filed at 07/07/18 1250   Gross per 24 hour   Intake              990 ml   Output              300 ml   Net              690 ml       Physical Exam:     General:  Alert, Awake, NAD, pleasant. Mild to moderate breathlessness with speaking. Head:   Normocephalic, without obvious abnormality, atraumatic. Eye:   Conjunctivae/corneas clear. PERRLA, no scleral icterus, no pallor, no cyanosis  Nose:   Nares normal. Septum midline. Mucosa normal without erythema/exudate. No drainage/discharge. No sinus tenderness. Throat:  Lips, mucosa, and tongue normal. OP: MP 3-4  Neck:   Supple, symmetric, thyroid: no enlargement/tenderness/nodule, no JVD, no carotid bruit, no lymphadenopathy. Trachea midline  Back & spine: Symmetric, no curvature. Chest wall: No tenderness or deformity. No rash  Lung:   End-expiratory wheezes throughout with tight sounding air movement. No rales or rhonchi. Heart:   Regular rate & rhythm. S1 S2 present, no murmur, no gallop, no click, no rub  Abdomen:  Soft, NT, ND, +BS, no masses, no organomegaly  Extremities:  No pedal edema, no cyanosis, no clubbing  Pulses: 2+ and symmetric in DP  Lymphatic:  No cervical, supraclavicular and axillary palpable lymphadenopathy. Musculoskeletal: No joint swelling or tenderness. Neurologic:  Grossly non focal.        Data:         Chemistry Recent Labs      07/07/18   0515  07/06/18   2330   GLU  325*  212*   NA  137  141   K  4.5  3.7   CL  102  104   CO2  26  30   BUN  12  10   CREA  1.05  0.99   CA  9.1  9.2   AGAP  9  7   BUCR  11*  10*        Lactic Acid Lactic acid   Date Value Ref Range Status   02/10/2017 1.9 0.4 - 2.0 MMOL/L Final     No results for input(s): LAC in the last 72 hours.      Liver Enzymes Protein, total   Date Value Ref Range Status   06/02/2018 7.6 6.4 - 8.2 g/dL Final Albumin   Date Value Ref Range Status   06/02/2018 3.7 3.4 - 5.0 g/dL Final     Globulin   Date Value Ref Range Status   06/02/2018 3.9 2.0 - 4.0 g/dL Final     A-G Ratio   Date Value Ref Range Status   06/02/2018 0.9 0.8 - 1.7   Final     AST (SGOT)   Date Value Ref Range Status   06/02/2018 23 15 - 37 U/L Final     Alk. phosphatase   Date Value Ref Range Status   06/02/2018 86 45 - 117 U/L Final     No results for input(s): TP, ALB, GLOB, AGRAT, SGOT, GPT, AP, TBIL in the last 72 hours. No lab exists for component: DBIL     CBC w/Diff Recent Labs      07/07/18   0515  07/06/18   2330   WBC  5.6  5.7   RBC  4.39  4.42   HGB  13.1  13.2   HCT  38.4  38.4   PLT  255  265   GRANS  85*  51   LYMPH  14*  40   EOS  0  2        Cardiac Enzymes Lab Results   Component Value Date/Time    CPK 88 07/06/2018 11:30 PM    CKMB 1.0 07/06/2018 11:30 PM    CKND1 1.1 07/06/2018 11:30 PM    TROIQ <0.02 07/06/2018 11:30 PM        BNP No results found for: BNP, BNPP, XBNPT     Coagulation No results for input(s): PTP, INR, APTT in the last 72 hours.     No lab exists for component: INREXT      Thyroid  Lab Results   Component Value Date/Time    TSH 2.76 09/18/2016 02:20 PM          Lipid Panel Lab Results   Component Value Date/Time    Cholesterol, total 220 (H) 11/20/2012 03:22 AM    HDL Cholesterol 62 (H) 11/20/2012 03:22 AM    LDL, calculated 144.6 (H) 11/20/2012 03:22 AM    VLDL, calculated 13.4 11/20/2012 03:22 AM    Triglyceride 67 11/20/2012 03:22 AM    CHOL/HDL Ratio 3.5 11/20/2012 03:22 AM        ABG Recent Labs      07/07/18   0216   PHI  7.452*   PCO2I  46.6*   PO2I  110*   HCO3I  32.5*   FIO2I  0.28        Urinalysis Lab Results   Component Value Date/Time    Color YELLOW 02/08/2017 03:30 PM    Appearance CLEAR 02/08/2017 03:30 PM    Specific gravity 1.026 02/08/2017 03:30 PM    pH (UA) 5.0 02/08/2017 03:30 PM    Protein NEGATIVE  02/08/2017 03:30 PM    Glucose >1000 (A) 02/08/2017 03:30 PM    Ketone TRACE (A) 02/08/2017 03:30 PM    Bilirubin NEGATIVE  02/08/2017 03:30 PM    Urobilinogen 0.2 02/08/2017 03:30 PM    Nitrites NEGATIVE  02/08/2017 03:30 PM    Leukocyte Esterase NEGATIVE  02/08/2017 03:30 PM    Epithelial cells FEW 11/19/2012 02:01 PM    Bacteria FEW (A) 02/29/2012 04:26 AM    WBC 0 to 1 11/19/2012 02:01 PM    RBC NONE 11/19/2012 02:01 PM        Micro  No results for input(s): SDES, CULT in the last 72 hours. No results for input(s): CULT in the last 72 hours. XR (Most Recent). CXR reviewed by me and compared with previous CXR   Results from Hospital Encounter encounter on 07/06/18   XR CHEST PA LAT   Narrative Examination: PA and lateral chest     History: Shortness of breath    Comparison: June 28, 2018    Findings: There is stable linear scarring in the right lung base. There is no  pleural effusion, pulmonary edema, or pneumothorax. No definite focal  consolidation is seen. Cardiomegaly is stable. Mild atherosclerosis of aortic  arch. Degenerative changes of the spine. Impression Impression:  1. No acute process. Scarring in the right lung base. CT (Most Recent)   Results from Hospital Encounter encounter on 02/21/18   CT CHEST WO CONT   Narrative CT CHEST WITHOUT ENHANCEMENT    INDICATION: Shortness of breath, COPD, smoking history, history of lung nodule  undergoing follow-up. TECHNIQUE: Axial images obtained from the thoracic inlet to the level of the  diaphragm without intravenous contrast. Coronal and sagittal reformatted images  were obtained. All CT scans at this facility are performed using dose optimization technique as  appropriate to a performed exam, to include automated exposure control,  adjustment of the mA and/or kV according to patient size (including appropriate  matching first site-specific examinations), or use of iterative reconstruction  technique. COMPARISON: 9/5/2013.     CHEST FINDINGS:   The evaluation of the mediastinum, hilum and vascular structures is limited in  the absence of intravenous contrast.      Thyroid: Unremarkable in its visualized aspects. Pericardium/ Heart: Heart size is normal. No pericardial effusion. Moderate LAD  coronary arteriosclerosis. Aorta/ Vessels: No aneurysm. Mild atherosclerosis. Lymph Nodes: Unremarkable. .    Lungs: Trachea and central bronchial tree are patent. Mild centrilobular  emphysema and central bronchial wall thickening. Similar mild scarring in the  right middle lobe. Pleura: No effusion. Upper Abdomen: There is moderate hepatic steatosis throughout. Suspect  hepatomegaly but the caudal liver is not included in the field-of-view. More  significant abdominal atherosclerosis with coarse calcification at the limited  visualized ostia. .    Bones/soft tissues: Degenerative disc disease. Impression IMPRESSION:    Mild emphysema and bronchitis. Moderate LAD coronary arteriosclerosis. Moderate hepatic steatosis and possible hepatomegaly but caudal liver is not  included in the field-of-view. EKG No results found for this or any previous visit. ECHO No results found for this or any previous visit. PFT No flowsheet data found. Other ASA reactivity:   Pre-albumin:   Ionized Calcium:   NH4:   T3, FT4:  Cortisol:  Urine Osm:  Urine Lytes:   HbA1c:      Recent Results (from the past 24 hour(s))   CBC WITH AUTOMATED DIFF    Collection Time: 07/06/18 11:30 PM   Result Value Ref Range    WBC 5.7 4.6 - 13.2 K/uL    RBC 4.42 4.20 - 5.30 M/uL    HGB 13.2 12.0 - 16.0 g/dL    HCT 38.4 35.0 - 45.0 %    MCV 86.9 74.0 - 97.0 FL    MCH 29.9 24.0 - 34.0 PG    MCHC 34.4 31.0 - 37.0 g/dL    RDW 13.4 11.6 - 14.5 %    PLATELET 740 423 - 973 K/uL    MPV 8.8 (L) 9.2 - 11.8 FL    NEUTROPHILS 51 40 - 73 %    LYMPHOCYTES 40 21 - 52 %    MONOCYTES 7 3 - 10 %    EOSINOPHILS 2 0 - 5 %    BASOPHILS 0 0 - 2 %    ABS. NEUTROPHILS 2.9 1.8 - 8.0 K/UL    ABS. LYMPHOCYTES 2.3 0.9 - 3.6 K/UL    ABS.  MONOCYTES 0.4 0.05 - 1.2 K/UL    ABS. EOSINOPHILS 0.1 0.0 - 0.4 K/UL    ABS.  BASOPHILS 0.0 0.0 - 0.06 K/UL    DF AUTOMATED     METABOLIC PANEL, BASIC    Collection Time: 07/06/18 11:30 PM   Result Value Ref Range    Sodium 141 136 - 145 mmol/L    Potassium 3.7 3.5 - 5.5 mmol/L    Chloride 104 100 - 108 mmol/L    CO2 30 21 - 32 mmol/L    Anion gap 7 3.0 - 18 mmol/L    Glucose 212 (H) 74 - 99 mg/dL    BUN 10 7.0 - 18 MG/DL    Creatinine 0.99 0.6 - 1.3 MG/DL    BUN/Creatinine ratio 10 (L) 12 - 20      GFR est AA >60 >60 ml/min/1.73m2    GFR est non-AA 58 (L) >60 ml/min/1.73m2    Calcium 9.2 8.5 - 10.1 MG/DL   CARDIAC PANEL,(CK, CKMB & TROPONIN)    Collection Time: 07/06/18 11:30 PM   Result Value Ref Range    CK 88 26 - 192 U/L    CK - MB 1.0 <3.6 ng/ml    CK-MB Index 1.1 0.0 - 4.0 %    Troponin-I, Qt. <0.02 0.0 - 0.045 NG/ML   NT-PRO BNP    Collection Time: 07/06/18 11:30 PM   Result Value Ref Range    NT pro-BNP 37 0 - 900 PG/ML   D DIMER    Collection Time: 07/06/18 11:30 PM   Result Value Ref Range    D DIMER 0.27 <0.46 ug/ml(FEU)   HEMOGLOBIN A1C WITH EAG    Collection Time: 07/06/18 11:30 PM   Result Value Ref Range    Hemoglobin A1c 7.9 (H) 4.2 - 5.6 %    Est. average glucose 180 mg/dL   EKG, 12 LEAD, INITIAL    Collection Time: 07/06/18 11:44 PM   Result Value Ref Range    Ventricular Rate 76 BPM    Atrial Rate 76 BPM    P-R Interval 150 ms    QRS Duration 88 ms    Q-T Interval 382 ms    QTC Calculation (Bezet) 429 ms    Calculated P Axis 80 degrees    Calculated R Axis 24 degrees    Calculated T Axis 64 degrees    Diagnosis       Normal sinus rhythm  Cannot rule out Anterior infarct , age undetermined  Abnormal ECG  When compared with ECG of 28-JUN-2018 06:47,  Nonspecific T wave abnormality now evident in Lateral leads     POC G3    Collection Time: 07/07/18  2:16 AM   Result Value Ref Range    Device: NASAL CANNULA      Flow rate (POC) 2 L/M    FIO2 (POC) 0.28 %    pH (POC) 7.452 (H) 7.35 - 7.45      pCO2 (POC) 46.6 (H) 35.0 - 45.0 MMHG    pO2 (POC) 110 (H) 80 - 100 MMHG    HCO3 (POC) 32.5 (H) 22 - 26 MMOL/L    sO2 (POC) 98 (H) 92 - 97 %    Base excess (POC) 7 mmol/L    Allens test (POC) YES      Site RIGHT RADIAL      Specimen type (POC) ARTERIAL      Performed by Darryl Opitz    CBC WITH AUTOMATED DIFF    Collection Time: 07/07/18  5:15 AM   Result Value Ref Range    WBC 5.6 4.6 - 13.2 K/uL    RBC 4.39 4.20 - 5.30 M/uL    HGB 13.1 12.0 - 16.0 g/dL    HCT 38.4 35.0 - 45.0 %    MCV 87.5 74.0 - 97.0 FL    MCH 29.8 24.0 - 34.0 PG    MCHC 34.1 31.0 - 37.0 g/dL    RDW 13.3 11.6 - 14.5 %    PLATELET 191 638 - 401 K/uL    MPV 9.1 (L) 9.2 - 11.8 FL    NEUTROPHILS 85 (H) 40 - 73 %    LYMPHOCYTES 14 (L) 21 - 52 %    MONOCYTES 1 (L) 3 - 10 %    EOSINOPHILS 0 0 - 5 %    BASOPHILS 0 0 - 2 %    ABS. NEUTROPHILS 4.8 1.8 - 8.0 K/UL    ABS. LYMPHOCYTES 0.8 (L) 0.9 - 3.6 K/UL    ABS. MONOCYTES 0.1 0.05 - 1.2 K/UL    ABS. EOSINOPHILS 0.0 0.0 - 0.4 K/UL    ABS.  BASOPHILS 0.0 0.0 - 0.06 K/UL    DF AUTOMATED     METABOLIC PANEL, BASIC    Collection Time: 07/07/18  5:15 AM   Result Value Ref Range    Sodium 137 136 - 145 mmol/L    Potassium 4.5 3.5 - 5.5 mmol/L    Chloride 102 100 - 108 mmol/L    CO2 26 21 - 32 mmol/L    Anion gap 9 3.0 - 18 mmol/L    Glucose 325 (H) 74 - 99 mg/dL    BUN 12 7.0 - 18 MG/DL    Creatinine 1.05 0.6 - 1.3 MG/DL    BUN/Creatinine ratio 11 (L) 12 - 20      GFR est AA >60 >60 ml/min/1.73m2    GFR est non-AA 54 (L) >60 ml/min/1.73m2    Calcium 9.1 8.5 - 10.1 MG/DL   GLUCOSE, POC    Collection Time: 07/07/18  7:41 AM   Result Value Ref Range    Glucose (POC) 328 (H) 70 - 110 mg/dL   ECHO ADULT COMPLETE    Collection Time: 07/07/18 11:28 AM   Result Value Ref Range    LVIDd 4.06 3.9 - 5.3 cm    LVPWd 1.13 (A) 0.6 - 0.9 cm    LVIDs 2.48 cm    IVSd 1.06 (A) 0.6 - 0.9 cm    LVOT d 1.88 cm    Left Ventricle Isovolumic Relaxation Time 72.8 ms    LV E' Lateral Velocity 8.66 cm/s    MV A Moe 126.16 cm/s    MV E Moe 0.86 cm/s    MV E/A 0.7 LA Vol 4C 36.92 22 - 52 mL    LA Area 4C 14.9 cm2    LV Mass .5 (A) 67 - 162 g    LV Mass AL Index 65.4 g/m2    E/E' lateral 9.9     Mitral Valve E Wave Deceleration Time 162.0 ms    LA Vol Index 17.25 ml/m2   GLUCOSE, POC    Collection Time: 07/07/18 11:40 AM   Result Value Ref Range    Glucose (POC) 375 (H) 70 - 110 mg/dL         Telemetry    The patient is: [x] acutely ill Risk of deterioration: [x] moderate    [] critically ill  [] high     [x]See my orders for details    My assessment, plan of care, findings, medications, side effects etc were discussed with:  [x] Nurse [] PT/OT    [] Respiratory therapy [x]     [] Family: answered all questions to satisfaction [x] Patient: answered all questions to satisfaction   [] Pharmacist []      [] Total critical care time exclusive of procedures 60 minutes with complex decision making, coordination of care and counseling patient performed and > 50% time spent in face to face evaluation.       Rukhsana Briseno MD  7/7/2018

## 2018-07-07 NOTE — PROGRESS NOTES
Albuterol 2.5 mg nebulizer was therapeutically interchanged for albuterol 90 mcg inhaler per the P&T Committee approved Therapeutic Interchanges Policy.      Zoë Brown [facility], Pharmacist  7/7/2018 5:04 AM

## 2018-07-08 LAB
ANION GAP SERPL CALC-SCNC: 10 MMOL/L (ref 3–18)
BASOPHILS # BLD: 0 K/UL (ref 0–0.06)
BASOPHILS NFR BLD: 0 % (ref 0–2)
BUN SERPL-MCNC: 17 MG/DL (ref 7–18)
BUN/CREAT SERPL: 15 (ref 12–20)
CALCIUM SERPL-MCNC: 9 MG/DL (ref 8.5–10.1)
CHLORIDE SERPL-SCNC: 104 MMOL/L (ref 100–108)
CO2 SERPL-SCNC: 25 MMOL/L (ref 21–32)
CREAT SERPL-MCNC: 1.13 MG/DL (ref 0.6–1.3)
DIFFERENTIAL METHOD BLD: ABNORMAL
EOSINOPHIL # BLD: 0 K/UL (ref 0–0.4)
EOSINOPHIL NFR BLD: 0 % (ref 0–5)
ERYTHROCYTE [DISTWIDTH] IN BLOOD BY AUTOMATED COUNT: 13.5 % (ref 11.6–14.5)
GLUCOSE BLD STRIP.AUTO-MCNC: 313 MG/DL (ref 70–110)
GLUCOSE BLD STRIP.AUTO-MCNC: 345 MG/DL (ref 70–110)
GLUCOSE BLD STRIP.AUTO-MCNC: 367 MG/DL (ref 70–110)
GLUCOSE BLD STRIP.AUTO-MCNC: 412 MG/DL (ref 70–110)
GLUCOSE SERPL-MCNC: 303 MG/DL (ref 74–99)
HCT VFR BLD AUTO: 35.4 % (ref 35–45)
HGB BLD-MCNC: 11.9 G/DL (ref 12–16)
LYMPHOCYTES # BLD: 0.6 K/UL (ref 0.9–3.6)
LYMPHOCYTES NFR BLD: 6 % (ref 21–52)
MCH RBC QN AUTO: 29.8 PG (ref 24–34)
MCHC RBC AUTO-ENTMCNC: 33.6 G/DL (ref 31–37)
MCV RBC AUTO: 88.5 FL (ref 74–97)
MONOCYTES # BLD: 0.2 K/UL (ref 0.05–1.2)
MONOCYTES NFR BLD: 2 % (ref 3–10)
NEUTS SEG # BLD: 9.5 K/UL (ref 1.8–8)
NEUTS SEG NFR BLD: 92 % (ref 40–73)
PLATELET # BLD AUTO: 249 K/UL (ref 135–420)
PMV BLD AUTO: 9.2 FL (ref 9.2–11.8)
POTASSIUM SERPL-SCNC: 4.5 MMOL/L (ref 3.5–5.5)
RBC # BLD AUTO: 4 M/UL (ref 4.2–5.3)
SODIUM SERPL-SCNC: 139 MMOL/L (ref 136–145)
WBC # BLD AUTO: 10.2 K/UL (ref 4.6–13.2)

## 2018-07-08 PROCEDURE — 65270000029 HC RM PRIVATE

## 2018-07-08 PROCEDURE — 5A09457 ASSISTANCE WITH RESPIRATORY VENTILATION, 24-96 CONSECUTIVE HOURS, CONTINUOUS POSITIVE AIRWAY PRESSURE: ICD-10-PCS | Performed by: FAMILY MEDICINE

## 2018-07-08 PROCEDURE — 74011250637 HC RX REV CODE- 250/637: Performed by: STUDENT IN AN ORGANIZED HEALTH CARE EDUCATION/TRAINING PROGRAM

## 2018-07-08 PROCEDURE — 80048 BASIC METABOLIC PNL TOTAL CA: CPT | Performed by: FAMILY MEDICINE

## 2018-07-08 PROCEDURE — 94640 AIRWAY INHALATION TREATMENT: CPT

## 2018-07-08 PROCEDURE — 94660 CPAP INITIATION&MGMT: CPT

## 2018-07-08 PROCEDURE — 85025 COMPLETE CBC W/AUTO DIFF WBC: CPT | Performed by: FAMILY MEDICINE

## 2018-07-08 PROCEDURE — 82962 GLUCOSE BLOOD TEST: CPT

## 2018-07-08 PROCEDURE — 36415 COLL VENOUS BLD VENIPUNCTURE: CPT | Performed by: FAMILY MEDICINE

## 2018-07-08 PROCEDURE — 74011250636 HC RX REV CODE- 250/636: Performed by: INTERNAL MEDICINE

## 2018-07-08 PROCEDURE — 74011250636 HC RX REV CODE- 250/636: Performed by: STUDENT IN AN ORGANIZED HEALTH CARE EDUCATION/TRAINING PROGRAM

## 2018-07-08 PROCEDURE — 74011636637 HC RX REV CODE- 636/637: Performed by: STUDENT IN AN ORGANIZED HEALTH CARE EDUCATION/TRAINING PROGRAM

## 2018-07-08 PROCEDURE — 74011000250 HC RX REV CODE- 250: Performed by: INTERNAL MEDICINE

## 2018-07-08 RX ORDER — INSULIN GLARGINE 100 [IU]/ML
28 INJECTION, SOLUTION SUBCUTANEOUS
Status: DISCONTINUED | OUTPATIENT
Start: 2018-07-08 | End: 2018-07-08

## 2018-07-08 RX ORDER — INSULIN GLARGINE 100 [IU]/ML
33 INJECTION, SOLUTION SUBCUTANEOUS
Status: DISCONTINUED | OUTPATIENT
Start: 2018-07-08 | End: 2018-07-10 | Stop reason: HOSPADM

## 2018-07-08 RX ADMIN — BUDESONIDE 500 MCG: 0.5 INHALANT RESPIRATORY (INHALATION) at 20:58

## 2018-07-08 RX ADMIN — IPRATROPIUM BROMIDE AND ALBUTEROL SULFATE 3 ML: 2.5; .5 SOLUTION RESPIRATORY (INHALATION) at 20:00

## 2018-07-08 RX ADMIN — ARFORMOTEROL TARTRATE 15 MCG: 15 SOLUTION RESPIRATORY (INHALATION) at 10:34

## 2018-07-08 RX ADMIN — METHYLPREDNISOLONE SODIUM SUCCINATE 60 MG: 40 INJECTION, POWDER, FOR SOLUTION INTRAMUSCULAR; INTRAVENOUS at 22:51

## 2018-07-08 RX ADMIN — INSULIN LISPRO 12 UNITS: 100 INJECTION, SOLUTION INTRAVENOUS; SUBCUTANEOUS at 08:42

## 2018-07-08 RX ADMIN — FLUTICASONE PROPIONATE 2 SPRAY: 50 SPRAY, METERED NASAL at 09:15

## 2018-07-08 RX ADMIN — LISINOPRIL 20 MG: 20 TABLET ORAL at 08:33

## 2018-07-08 RX ADMIN — IPRATROPIUM BROMIDE AND ALBUTEROL SULFATE 3 ML: 2.5; .5 SOLUTION RESPIRATORY (INHALATION) at 00:41

## 2018-07-08 RX ADMIN — PANTOPRAZOLE SODIUM 40 MG: 40 TABLET, DELAYED RELEASE ORAL at 08:33

## 2018-07-08 RX ADMIN — IPRATROPIUM BROMIDE AND ALBUTEROL SULFATE 3 ML: 2.5; .5 SOLUTION RESPIRATORY (INHALATION) at 06:21

## 2018-07-08 RX ADMIN — IPRATROPIUM BROMIDE AND ALBUTEROL SULFATE 3 ML: 2.5; .5 SOLUTION RESPIRATORY (INHALATION) at 18:30

## 2018-07-08 RX ADMIN — INSULIN LISPRO 15 UNITS: 100 INJECTION, SOLUTION INTRAVENOUS; SUBCUTANEOUS at 22:50

## 2018-07-08 RX ADMIN — METHYLPREDNISOLONE SODIUM SUCCINATE 125 MG: 125 INJECTION, POWDER, FOR SOLUTION INTRAMUSCULAR; INTRAVENOUS at 08:33

## 2018-07-08 RX ADMIN — ARFORMOTEROL TARTRATE 15 MCG: 15 SOLUTION RESPIRATORY (INHALATION) at 20:58

## 2018-07-08 RX ADMIN — BUDESONIDE 500 MCG: 0.5 INHALANT RESPIRATORY (INHALATION) at 10:34

## 2018-07-08 RX ADMIN — MONTELUKAST SODIUM 10 MG: 10 TABLET, FILM COATED ORAL at 22:50

## 2018-07-08 RX ADMIN — INSULIN GLARGINE 33 UNITS: 100 INJECTION, SOLUTION SUBCUTANEOUS at 22:52

## 2018-07-08 RX ADMIN — IPRATROPIUM BROMIDE AND ALBUTEROL SULFATE 3 ML: 2.5; .5 SOLUTION RESPIRATORY (INHALATION) at 14:51

## 2018-07-08 RX ADMIN — ENOXAPARIN SODIUM 40 MG: 40 INJECTION, SOLUTION INTRAVENOUS; SUBCUTANEOUS at 02:51

## 2018-07-08 RX ADMIN — INSULIN LISPRO 9 UNITS: 100 INJECTION, SOLUTION INTRAVENOUS; SUBCUTANEOUS at 11:42

## 2018-07-08 RX ADMIN — IPRATROPIUM BROMIDE AND ALBUTEROL SULFATE 3 ML: 2.5; .5 SOLUTION RESPIRATORY (INHALATION) at 10:34

## 2018-07-08 RX ADMIN — INSULIN LISPRO 15 UNITS: 100 INJECTION, SOLUTION INTRAVENOUS; SUBCUTANEOUS at 16:00

## 2018-07-08 RX ADMIN — ASPIRIN 81 MG: 81 TABLET, COATED ORAL at 08:33

## 2018-07-08 NOTE — PROGRESS NOTES
Patient sitting up on side of bed. A/OX4. Denies pain. Appears to be in no resp. distress. 02 2 liters with humidity. Lung sound diminished bilaterally. Dry cough occasionally. Encouraged to use incentive spirometer, up to 500ml. Fall precautions. Call bell phone within reach. Side rails X2.

## 2018-07-08 NOTE — PROGRESS NOTES
Resident Progress Note  [facility]       Patient: Ariana Colon MRN: [id number]  CSN: [id number]    YOB: 1960  Age: 61 y.o. Sex: female    DOA: 7/6/2018 LOS:  LOS: 1 day                    Subjective:     Acute events:   Bi-pap overnight  Resumed IV Steroid per Pulm yesterday      Patient states breathing much improved. Tolerated Bi-pap. Tolerating diet. Review of Systems   Constitutional: Negative for chills and fever. Respiratory: Positive for wheezing. Cardiovascular: Negative for chest pain and palpitations. Gastrointestinal: Negative for abdominal pain, heartburn, nausea and vomiting. Objective:      Patient Vitals for the past 24 hrs:   Temp Pulse Resp BP SpO2   07/08/18 0622 - - - - 97 %   07/08/18 0621 - - - - 97 %   07/08/18 0400 95.9 °F (35.5 °C) 81 20 120/71 94 %   07/08/18 0045 - - - - 98 %   07/08/18 0041 - - - - 98 %   07/08/18 0000 96.8 °F (36 °C) 84 18 110/60 97 %   07/07/18 2015 - - - - 97 %   07/07/18 1945 98.2 °F (36.8 °C) 91 20 118/67 95 %   07/07/18 1517 97.7 °F (36.5 °C) 86 21 128/66 97 %   07/07/18 1507 - - - - 95 %   07/07/18 1128 - - - - 95 %   07/07/18 1109 97.9 °F (36.6 °C) 92 18 130/73 95 %   07/07/18 1026 - - - 148/90 -   07/07/18 0815 - - - - 93 %   07/07/18 0735 97.7 °F (36.5 °C) 87 21 148/90 95 %         Intake/Output Summary (Last 24 hours) at 07/08/18 0730  Last data filed at 07/07/18 1801   Gross per 24 hour   Intake             1350 ml   Output             1300 ml   Net               50 ml         Physical Exam:   General:  Alert and Responsive and in No acute distress. Speaking in full sentences  HEENT: moist membranes  CV:  RRR, no rubs gallops or murmurs  RESP:  Unlabored breathing. Mild exp wheezes with air movement to bases  Neuro:  Moving all extremities  Ext:  No edema. Skin:  No rashes, lesions, or ulcers.          Lab/Data Reviewed:  BMP:   Lab Results   Component Value Date/Time     07/08/2018 02:20 AM K 4.5 07/08/2018 02:20 AM     07/08/2018 02:20 AM    CO2 25 07/08/2018 02:20 AM    AGAP 10 07/08/2018 02:20 AM     (H) 07/08/2018 02:20 AM    BUN 17 07/08/2018 02:20 AM    CREA 1.13 07/08/2018 02:20 AM    GFRAA 60 (L) 07/08/2018 02:20 AM    GFRNA 49 (L) 07/08/2018 02:20 AM     CMP:   Lab Results   Component Value Date/Time     07/08/2018 02:20 AM    K 4.5 07/08/2018 02:20 AM     07/08/2018 02:20 AM    CO2 25 07/08/2018 02:20 AM    AGAP 10 07/08/2018 02:20 AM     (H) 07/08/2018 02:20 AM    BUN 17 07/08/2018 02:20 AM    CREA 1.13 07/08/2018 02:20 AM    GFRAA 60 (L) 07/08/2018 02:20 AM    GFRNA 49 (L) 07/08/2018 02:20 AM    CA 9.0 07/08/2018 02:20 AM     CBC:   Lab Results   Component Value Date/Time    WBC 10.2 07/08/2018 02:20 AM    HGB 11.9 (L) 07/08/2018 02:20 AM    HCT 35.4 07/08/2018 02:20 AM     07/08/2018 02:20 AM       Lab Results   Component Value Date/Time     (H) 07/08/2018 02:20 AM        Scheduled Medications Reviewed:  Current Facility-Administered Medications   Medication Dose Route Frequency    enoxaparin (LOVENOX) injection 40 mg  40 mg SubCUTAneous Q24H    montelukast (SINGULAIR) tablet 10 mg  10 mg Oral QHS    fluticasone (FLONASE) 50 mcg/actuation nasal spray 2 Spray  2 Spray Both Nostrils DAILY    aspirin delayed-release tablet 81 mg  81 mg Oral DAILY    lisinopril (PRINIVIL, ZESTRIL) tablet 20 mg  20 mg Oral BID    pantoprazole (PROTONIX) tablet 40 mg  40 mg Oral ACB    insulin glargine (LANTUS) injection 25 Units  25 Units SubCUTAneous QHS    insulin lispro (HUMALOG) injection   SubCUTAneous AC&HS    albuterol-ipratropium (DUO-NEB) 2.5 MG-0.5 MG/3 ML  3 mL Nebulization Q4H RT    methylPREDNISolone (PF) (SOLU-MEDROL) injection 125 mg  125 mg IntraVENous Q12H    arformoterol (BROVANA) neb solution 15 mcg  15 mcg Nebulization BID RT    budesonide (PULMICORT) 500 mcg/2 ml nebulizer suspension  500 mcg Nebulization BID RT         Imaging, microbiology, and EKG/Telemetry:  No studies in last 24 hours    Assessment/Plan     Marlys Brennan is a 61 y.o. female with a PMHx of COPD, Asthma, HTN, Type II DM, and GERD, who is now admitted with an acute exacerbation of COPD.      Acute Exacerbation of COPD w/Chronic Hypoxic Respiratory Failure- Improving   Severe COPD, GOLD Class D(High sxs burden, >2 exacerbation in last year, [email or web address]), on home O2 at 2 LPM with any activity;   - appreciate Pulmonology recs  - Continue home O2 to keep SpO2 88-92%   - Substitute Brovanna and Pulmicort for home Advair 2 puffs BID  hold home Incruse Ellipta while having acute exacerbation   -ABX held by Pulm since treated as outpatient   - Continue home Montelukast 10 mg daily   - Continue home Flonase PRN   - Duo-Nebs q4hrs per RT   -CTD Bi-pap per Pulm  - HOB>30*, Bronchial hygiene,Pum toilet,  Incentive spirometry   - Continue Solumedrol 125 Q12, wean tomorrow  -PT, OT  -Outpatient Pulmonary follow up with continued discussion of home bi-pap candidacy      Type II DM- Last HbA1c 7.7 in 3/2018; A! C on admission: 7.9 BG 300s due to steroids   Pt previously on Metformin but unable to tolerate due to diarrhea and possible lactic acidosis that developed   - Increase Lantus to 28 units nightly   - SSI (high sensitivity) w/Accuchecks ACHS         HTN, Hx diastolic CHF- BP Not well controlled as outpatient; Last TTE 9/2016 showed EF 60% with G1DD; pt has had appointment with cardiologist Dr. Madison Ritchie   7/7 TTE: EF 66-70%, concentric hypertrophy,  - Continue home ASA 81 mg daily, Lisinopril 20 mg qam, 20 mg qpm       SHERRIE- Followed by Dr. Elizabeth Nesbitt, pulmonology;  On CPAP nightly at home, cannot tolerate when having an exacerbation   -Hold Home CPAP   -Bipap QHS per PULM and RT      GERD  - Continue home Protonix 40 mg daily   - Home Pepcid 20 mg BID PRN      Diet: Diabetic  DVT Prophylaxis: Lovenox 40mg  Code Status: Full  Point of Contact: Ian Pritchett (daughter) [phone]      Disposition: 3-4 days    Keith Tripp MD PGY-2  [address]

## 2018-07-09 ENCOUNTER — APPOINTMENT (OUTPATIENT)
Dept: GENERAL RADIOLOGY | Age: 58
DRG: 191 | End: 2018-07-09
Attending: INTERNAL MEDICINE
Payer: MEDICARE

## 2018-07-09 ENCOUNTER — APPOINTMENT (OUTPATIENT)
Dept: CT IMAGING | Age: 58
DRG: 191 | End: 2018-07-09
Attending: STUDENT IN AN ORGANIZED HEALTH CARE EDUCATION/TRAINING PROGRAM
Payer: MEDICARE

## 2018-07-09 LAB
ANION GAP SERPL CALC-SCNC: 8 MMOL/L (ref 3–18)
BASOPHILS # BLD: 0 K/UL (ref 0–0.1)
BASOPHILS NFR BLD: 0 % (ref 0–2)
BUN SERPL-MCNC: 20 MG/DL (ref 7–18)
BUN/CREAT SERPL: 17 (ref 12–20)
CALCIUM SERPL-MCNC: 9.1 MG/DL (ref 8.5–10.1)
CHLORIDE SERPL-SCNC: 103 MMOL/L (ref 100–108)
CO2 SERPL-SCNC: 26 MMOL/L (ref 21–32)
CREAT SERPL-MCNC: 1.16 MG/DL (ref 0.6–1.3)
DIFFERENTIAL METHOD BLD: ABNORMAL
EOSINOPHIL # BLD: 0 K/UL (ref 0–0.4)
EOSINOPHIL NFR BLD: 0 % (ref 0–5)
ERYTHROCYTE [DISTWIDTH] IN BLOOD BY AUTOMATED COUNT: 13.9 % (ref 11.6–14.5)
GLUCOSE BLD STRIP.AUTO-MCNC: 233 MG/DL (ref 70–110)
GLUCOSE BLD STRIP.AUTO-MCNC: 271 MG/DL (ref 70–110)
GLUCOSE BLD STRIP.AUTO-MCNC: 285 MG/DL (ref 70–110)
GLUCOSE BLD STRIP.AUTO-MCNC: 304 MG/DL (ref 70–110)
GLUCOSE SERPL-MCNC: 313 MG/DL (ref 74–99)
HCT VFR BLD AUTO: 34.5 % (ref 35–45)
HGB BLD-MCNC: 11.4 G/DL (ref 12–16)
LYMPHOCYTES # BLD: 0.8 K/UL (ref 0.9–3.6)
LYMPHOCYTES NFR BLD: 6 % (ref 21–52)
MCH RBC QN AUTO: 29.4 PG (ref 24–34)
MCHC RBC AUTO-ENTMCNC: 33 G/DL (ref 31–37)
MCV RBC AUTO: 88.9 FL (ref 74–97)
MONOCYTES # BLD: 0.4 K/UL (ref 0.05–1.2)
MONOCYTES NFR BLD: 3 % (ref 3–10)
NEUTS SEG # BLD: 11.7 K/UL (ref 1.8–8)
NEUTS SEG NFR BLD: 91 % (ref 40–73)
PLATELET # BLD AUTO: 273 K/UL (ref 135–420)
PMV BLD AUTO: 9.4 FL (ref 9.2–11.8)
POTASSIUM SERPL-SCNC: 4.6 MMOL/L (ref 3.5–5.5)
RBC # BLD AUTO: 3.88 M/UL (ref 4.2–5.3)
SODIUM SERPL-SCNC: 137 MMOL/L (ref 136–145)
WBC # BLD AUTO: 12.9 K/UL (ref 4.6–13.2)

## 2018-07-09 PROCEDURE — 74011250637 HC RX REV CODE- 250/637: Performed by: STUDENT IN AN ORGANIZED HEALTH CARE EDUCATION/TRAINING PROGRAM

## 2018-07-09 PROCEDURE — 92526 ORAL FUNCTION THERAPY: CPT

## 2018-07-09 PROCEDURE — 92611 MOTION FLUOROSCOPY/SWALLOW: CPT

## 2018-07-09 PROCEDURE — 74011636637 HC RX REV CODE- 636/637: Performed by: STUDENT IN AN ORGANIZED HEALTH CARE EDUCATION/TRAINING PROGRAM

## 2018-07-09 PROCEDURE — 74011250636 HC RX REV CODE- 250/636: Performed by: STUDENT IN AN ORGANIZED HEALTH CARE EDUCATION/TRAINING PROGRAM

## 2018-07-09 PROCEDURE — 94660 CPAP INITIATION&MGMT: CPT

## 2018-07-09 PROCEDURE — 36415 COLL VENOUS BLD VENIPUNCTURE: CPT | Performed by: FAMILY MEDICINE

## 2018-07-09 PROCEDURE — 80048 BASIC METABOLIC PNL TOTAL CA: CPT | Performed by: FAMILY MEDICINE

## 2018-07-09 PROCEDURE — 74011000255 HC RX REV CODE- 255: Performed by: FAMILY MEDICINE

## 2018-07-09 PROCEDURE — 97116 GAIT TRAINING THERAPY: CPT

## 2018-07-09 PROCEDURE — 85025 COMPLETE CBC W/AUTO DIFF WBC: CPT | Performed by: FAMILY MEDICINE

## 2018-07-09 PROCEDURE — 70486 CT MAXILLOFACIAL W/O DYE: CPT

## 2018-07-09 PROCEDURE — 74230 X-RAY XM SWLNG FUNCJ C+: CPT

## 2018-07-09 PROCEDURE — 65270000029 HC RM PRIVATE

## 2018-07-09 PROCEDURE — 82962 GLUCOSE BLOOD TEST: CPT

## 2018-07-09 PROCEDURE — 77010033678 HC OXYGEN DAILY

## 2018-07-09 PROCEDURE — 94640 AIRWAY INHALATION TREATMENT: CPT

## 2018-07-09 PROCEDURE — 74011000250 HC RX REV CODE- 250: Performed by: INTERNAL MEDICINE

## 2018-07-09 RX ORDER — PREDNISONE 20 MG/1
60 TABLET ORAL
Status: DISCONTINUED | OUTPATIENT
Start: 2018-07-09 | End: 2018-07-10 | Stop reason: HOSPADM

## 2018-07-09 RX ORDER — IPRATROPIUM BROMIDE 0.5 MG/2.5ML
0.5 SOLUTION RESPIRATORY (INHALATION)
Status: DISCONTINUED | OUTPATIENT
Start: 2018-07-09 | End: 2018-07-10 | Stop reason: HOSPADM

## 2018-07-09 RX ADMIN — MONTELUKAST SODIUM 10 MG: 10 TABLET, FILM COATED ORAL at 22:18

## 2018-07-09 RX ADMIN — BUDESONIDE 500 MCG: 0.5 INHALANT RESPIRATORY (INHALATION) at 08:25

## 2018-07-09 RX ADMIN — INSULIN LISPRO 12 UNITS: 100 INJECTION, SOLUTION INTRAVENOUS; SUBCUTANEOUS at 12:41

## 2018-07-09 RX ADMIN — INSULIN LISPRO 9 UNITS: 100 INJECTION, SOLUTION INTRAVENOUS; SUBCUTANEOUS at 17:05

## 2018-07-09 RX ADMIN — ARFORMOTEROL TARTRATE 15 MCG: 15 SOLUTION RESPIRATORY (INHALATION) at 08:25

## 2018-07-09 RX ADMIN — ENOXAPARIN SODIUM 40 MG: 40 INJECTION, SOLUTION INTRAVENOUS; SUBCUTANEOUS at 03:04

## 2018-07-09 RX ADMIN — FLUTICASONE PROPIONATE 2 SPRAY: 50 SPRAY, METERED NASAL at 09:09

## 2018-07-09 RX ADMIN — BARIUM SULFATE 700 MG: 700 TABLET ORAL at 14:00

## 2018-07-09 RX ADMIN — PREDNISONE 60 MG: 20 TABLET ORAL at 09:07

## 2018-07-09 RX ADMIN — LISINOPRIL 20 MG: 20 TABLET ORAL at 22:29

## 2018-07-09 RX ADMIN — ASPIRIN 81 MG: 81 TABLET, COATED ORAL at 09:05

## 2018-07-09 RX ADMIN — BARIUM SULFATE 30 ML: 400 PASTE ORAL at 14:00

## 2018-07-09 RX ADMIN — PANTOPRAZOLE SODIUM 40 MG: 40 TABLET, DELAYED RELEASE ORAL at 09:05

## 2018-07-09 RX ADMIN — LISINOPRIL 20 MG: 20 TABLET ORAL at 09:05

## 2018-07-09 RX ADMIN — BARIUM SULFATE 45 G: 960 POWDER, FOR SUSPENSION ORAL at 14:00

## 2018-07-09 RX ADMIN — INSULIN LISPRO 9 UNITS: 100 INJECTION, SOLUTION INTRAVENOUS; SUBCUTANEOUS at 09:03

## 2018-07-09 RX ADMIN — INSULIN GLARGINE 33 UNITS: 100 INJECTION, SOLUTION SUBCUTANEOUS at 22:24

## 2018-07-09 RX ADMIN — INSULIN LISPRO 6 UNITS: 100 INJECTION, SOLUTION INTRAVENOUS; SUBCUTANEOUS at 22:23

## 2018-07-09 NOTE — PROGRESS NOTES
Intern Progress Note  [facility]       Patient: Melissa Giron MRN: [id number]  CSN: [id number]    YOB: 1960  Age: 61 y.o. Sex: female    DOA: 7/6/2018 LOS:  LOS: 2 days   PCP: Milena Zamora MD                 Subjective:     Acute events: no acute events overnight. Patient says she is feeling much better and her breathing has improved. She says she didn't need her ventolin and was not SOB while walking to bathroom.      Brief ROS: Negative for CP, abdominal pain, fevers, chills, urination w/o difficulty     Objective:      Patient Vitals for the past 24 hrs:   Temp Pulse Resp BP SpO2   07/09/18 0826 - - - - 98 %   07/09/18 0804 97.8 °F (36.6 °C) 65 18 149/75 98 %   07/09/18 0452 - - - - 96 %   07/09/18 0400 95.1 °F (35.1 °C) 67 18 107/64 96 %   07/09/18 0000 97.2 °F (36.2 °C) 74 20 108/69 98 %   07/08/18 2102 - - - - 99 %   07/08/18 2000 98.6 °F (37 °C) 88 18 116/64 96 %   07/08/18 1818 - 83 - 104/64 -   07/08/18 1556 98.1 °F (36.7 °C) 96 18 - 100 %   07/08/18 1112 97.3 °F (36.3 °C) 83 20 128/73 96 %       Physical Exam:   General: Patient in NAD, on 3L of NC   Cardiovascular: RRR w/o MRGs  Respiratory: CTAB no rales, rhonchi, wheezes  Abdomen: Soft, +BS, non-tendeder, Non-Distended  Extremities: no peripheral edema, 2+ dorsalis pedis pulses  Neuro: Cranial nerves grossly intact, grossly moving upper and lower extremities  Skin: Negative for lesions, ulcers, rashes    Lab/Data Reviewed:  BMP:   Lab Results   Component Value Date/Time     07/09/2018 02:35 AM    K 4.6 07/09/2018 02:35 AM     07/09/2018 02:35 AM    CO2 26 07/09/2018 02:35 AM    AGAP 8 07/09/2018 02:35 AM     (H) 07/09/2018 02:35 AM    BUN 20 (H) 07/09/2018 02:35 AM    CREA 1.16 07/09/2018 02:35 AM    GFRAA 58 (L) 07/09/2018 02:35 AM    GFRNA 48 (L) 07/09/2018 02:35 AM     CMP:   Lab Results   Component Value Date/Time     07/09/2018 02:35 AM    K 4.6 07/09/2018 02:35 AM     07/09/2018 02:35 AM    CO2 26 07/09/2018 02:35 AM    AGAP 8 07/09/2018 02:35 AM     (H) 07/09/2018 02:35 AM    BUN 20 (H) 07/09/2018 02:35 AM    CREA 1.16 07/09/2018 02:35 AM    GFRAA 58 (L) 07/09/2018 02:35 AM    GFRNA 48 (L) 07/09/2018 02:35 AM    CA 9.1 07/09/2018 02:35 AM     CBC:   Lab Results   Component Value Date/Time    WBC 12.9 07/09/2018 02:35 AM    HGB 11.4 (L) 07/09/2018 02:35 AM    HCT 34.5 (L) 07/09/2018 02:35 AM     07/09/2018 02:35 AM        CBC w/Diff Recent Labs      07/09/18 0235 07/08/18 0220 07/07/18   0515   WBC  12.9  10.2  5.6   RBC  3.88*  4.00*  4.39   HGB  11.4*  11.9*  13.1   HCT  34.5*  35.4  38.4   PLT  273  249  255   GRANS  91*  92*  85*   LYMPH  6*  6*  14*   EOS  0  0  0      Chemistry Recent Labs      07/09/18   0808  07/09/18 0235 07/08/18 0220 07/07/18   0515   GLUCPOC  271*   --    < >   --    < >   --    GLU   --   313*   --   303*   --   325*   NA   --   137   --   139   --   137   K   --   4.6   --   4.5   --   4.5   CL   --   103   --   104   --   102   CO2   --   26   --   25   --   26   BUN   --   20*   --   17   --   12   CREA   --   1.16   --   1.13   --   1.05   CA   --   9.1   --   9.0   --   9.1   AGAP   --   8   --   10   --   9   BUCR   --   17   --   15   --   11*    < > = values in this interval not displayed. Microbiology No results for input(s): SDES, CULT in the last 72 hours. No results for input(s): CULT in the last 72 hours.    I/O   Intake/Output Summary (Last 24 hours) at 07/09/18 0855  Last data filed at 07/08/18 1316   Gross per 24 hour   Intake              480 ml   Output                0 ml   Net              480 ml          Scheduled Medications Reviewed:  Current Facility-Administered Medications   Medication Dose Route Frequency    predniSONE (DELTASONE) tablet 60 mg  60 mg Oral DAILY WITH BREAKFAST    insulin glargine (LANTUS) injection 33 Units  33 Units SubCUTAneous QHS    enoxaparin (LOVENOX) injection 40 mg  40 mg SubCUTAneous Q24H    montelukast (SINGULAIR) tablet 10 mg  10 mg Oral QHS    fluticasone (FLONASE) 50 mcg/actuation nasal spray 2 Spray  2 Spray Both Nostrils DAILY    aspirin delayed-release tablet 81 mg  81 mg Oral DAILY    lisinopril (PRINIVIL, ZESTRIL) tablet 20 mg  20 mg Oral BID    pantoprazole (PROTONIX) tablet 40 mg  40 mg Oral ACB    insulin lispro (HUMALOG) injection   SubCUTAneous AC&HS    albuterol-ipratropium (DUO-NEB) 2.5 MG-0.5 MG/3 ML  3 mL Nebulization Q4H RT    arformoterol (BROVANA) neb solution 15 mcg  15 mcg Nebulization BID RT    budesonide (PULMICORT) 500 mcg/2 ml nebulizer suspension  500 mcg Nebulization BID RT                          Assessment/Plan   Parish Ricks is a 61 y.o. female with a PMHx of COPD, Asthma, HTN, Type II DM, and GERD, who is now admitted with an acute exacerbation of COPD.      Acute Exacerbation of COPD w/Chronic Hypoxic Respiratory Failure- Improving   Severe COPD, GOLD Class D (High sxs burden, >2 exacerbation in last year, [email or web address]), on home O2 at 2 LPM with any activity;   - appreciate Pulmonology recs  - Continue home O2 to keep SpO2 88-92%   - Substitute Brovanna and Pulmicort for home Advair 2 puffs BID  - hold home Incruse Ellipta while having acute exacerbation   - ABX held by Pulm since treated as outpatient   - Continue home Montelukast 10 mg daily   - Continue home Flonase PRN   - Duo-Nebs q4hrs per RT   - CTD Bi-pap per Pulm  - HOB>30*, Bronchial hygiene, Plum toilet, Incentive spirometry   - started on 60 Prednisone PO  -PT, OT  -Outpatient Pulmonary follow up with continued discussion of home bi-pap candidacy      Type II DM- Last HbA1c 7.7 in 3/2018; on admission: 7.9, BG 300s due to steroids  Pt previously on Metformin but unable to tolerate due to diarrhea and possible lactic acidosis that developed   - Increase Lantus to 33 units nightly   - SSI (high sensitivity) w/Accuchecks ACHS          HTN, Hx diastolic CHF- BP Not well controlled as outpatient;  Last TTE 9/2016 showed EF 60% with G1DD; pt has had appointment with cardiologist Dr. Yessenia Tello   7/7 TTE: EF 66-70%, concentric hypertrophy,  - Continue home ASA 81 mg daily, Lisinopril 20 mg qam      SHERRIE- Followed by Dr. Sharron Cameron, pulmonology; On CPAP nightly at home, cannot tolerate when having an exacerbation   -Hold Home CPAP   -Bipap QHS per PULM and RT      GERD  - Continue home Protonix 40 mg daily   - Home Pepcid 20 mg BID PRN       Diet: Diabetic  DVT Prophylaxis: Lovenox 40mg  Code Status: Full  Point of Contact: Seema Dumont (daughter) [phone]    Annika Angeles MD PGY-1  7/9/2018, 8:55 AM

## 2018-07-09 NOTE — PROGRESS NOTES
Problem: Dysphagia (Adult)  Goal: *Acute Goals and Plan of Care (Insert Text)    Rec:   Reg solid diet with thin liquids  Aspiration precautions  Oral care TID  Meds as tolerated           Outcome: Resolved/Met Date Met: 07/09/18  Speech Pathology Modified barium swallow Study/treatment and discharge    Patient: Lynda Marshall (78 y.o. female)  Date: 7/9/2018  Primary Diagnosis: COPD exacerbation ([facility] [address]        Precautions: aspiration       ASSESSMENT :  Based on the objective data described below, the patient presents with oropharyngeal swallow fxn essentially [facility]. Pt tolerated reg solid, puree, thin liquid +/- straw, and 13 mm Ba pill with thin liquid wash without aspiration/penetration events. Bolus manipulation, tongue base retraction, laryngeal elevation, and overall pharyngeal motility/sensation were functional and timely throughout the study. Positive oropharyngeal clearance appreciated. Rec reg solid diet with thin liquids, aspiration precautions, oral care TID, and meds as tolerated. No further skilled SLP services are indicated at this time. Please re-consult if needed. PLAN :  Recommendations and Planned Interventions: See above  Frequency/Duration: x eval/tx only  Discharge Recommendations: Outpatient and To Be Determined     SUBJECTIVE:   Patient stated I'm glad everything looked normal on this test.     OBJECTIVE:     Past Medical History:   Diagnosis Date    Asthma     COPD     Cystocele, midline     Diabetes mellitus ([facility] [address]     GERD (gastroesophageal reflux disease)     Hidradenitis suppurativa     Hyperlipidemia     Hypertension     SHERRIE on CPAP     CPAP    Stress incontinence      Past Surgical History:   Procedure Laterality Date    BREAST SURGERY PROCEDURE UNLISTED      Right breast benign tumor removal    HX APPENDECTOMY      HX CHOLECYSTECTOMY      HX DILATION AND CURETTAGE      Dysfunctional uterine bleeding, thought 2/2 fibroids    HX TUBAL LIGATION       Prior Level of Function/Home Situation: private residence  [address]: Private residence  [address]: Two story  # of Interior Steps: [address]: Right  Living Alone: No  Support Systems: Family member(s)  Patient Expects to be Discharged to[de-identified] Private residence  Current DME Used/Available at Home: Commode, bedside, CPAP, Oxygen, portable, Walker, Wheelchair  Diet prior to admission: reg solid with thin  Current Diet:  Reg solid with thin   Radiologist:    Film Views: Fluoro;Lateral  Patient Position: 90 in fluoro chair    Trial 1:   Consistency Presented: Thin liquid; Solid;Puree;Pill/Tablet   How Presented: Self-fed/presented;Cup/sip;Spoon;Straw       Bolus Acceptance: No impairment   Bolus Formation/Control: No impairment:     Propulsion: No impairment   Oral Residue: None   Initiation of Swallow: No impairment   Timing: No impairment   Penetration: None   Aspiration/Timing: No evidence of aspiration   Pharyngeal Clearance: No residue       Effective Modifications: None   Cues for Modifications: None       Decreased Tongue Base Retraction?: No  Laryngeal Elevation: WFL (within functional limits)  Aspiration/Penetration Score: 1 (No penetration or aspiration-Contrast does not enter the airway)  Pharyngeal Symmetry: Not assessed  Pharyngeal-Esophageal Segment: No impairment  Pharyngeal Dysfunction: None    Oral Phase Severity: No impairment  Pharyngeal Phase Severity: N/A     Treatment Performed Following Evaluation:  Training and education provided related to anatomy/physiology of oropharyngeal swallow, results of MBS, diet recs, safe swallowing strategies (small sips, alternating bites/sips) and positioning. Pt able to verbalize understanding.     GCODESwallowing:  Swallow Current Status CH= 0%   Swallow Goal Status CH= 0%   Swallow D/C Status CH= 0%    The severity rating is based on the following outcomes:    8-point Penetration-Aspiration Scale: Score 1  Professional Judgment    PAIN:  Pt reports 0/10 pain or discomfort prior to MBS. Pt reports 0/10 pain or discomfort post MBS. COMMUNICATION/EDUCATION:   [x]  Patient educated regarding MBS results and diet recommendations. [x]  Patient/family have participated as able in goal setting and plan of care.     Thank you for this referral.    Pedro Pablo Pereira M.S. CCC-SLP/L  Speech-Language Pathologist

## 2018-07-09 NOTE — ROUTINE PROCESS
Assumed care of patient received report from JOSÉ MIGUEL Small.  Patient resting omfortably in no acute distress,

## 2018-07-09 NOTE — CONSULTS
[facility]  Pulmonary, Critical Care, and Sleep Medicine    Name: Deuce Murguia MRN: [id number]   : 1960 Hospital: [facility]   Date: 2018        Pulmonary Follow-up In-Patient Consult                                              Consult requesting physician: Dr. Brittaney Perez  Reason for Consult: COPD- Asthma exacerbation    IMPRESSION:   · Severe COPD with significant asthma Has had several hospitalizations over the past year for COPD-Asthma exacerbations (this is the 3rd this year) -Admitted for  acute exacerbation failed outpatient therapy with oral steroids. No evidence for infection and has completed course of appropriate antibiotics. · History of asthma  · GOLD stage 3D COPD (high symptoms and increased frequency of exacerbations) with home O2 at 2L/min. Last christiano in  showed FEV1- 1.32L (51% predicted)- (post BD) 51% with signficant BD response. · Mild SHERRIE with AHI = 12 currently prescribed CPAP 9 cm H20 with EPR = 2  · Report of palpitations/rapid heart rate- was undergoing evaluation with Holter monitor - Dr. Bennett Toledo  · Obesity  · DM- glucose in the 300 range  · GERD  · HTN      RECOMMENDATIONS:   · Prednisone 60mg Q day- (stepped down from IV Solumderol)  · Esophagram; if negative for high level reflux obtain sinus CT PRIOR to discharge- need to explore other exacerbating factors given the frequency of hospital admissions. · Albuterol Neb PRN  · Continue LABA - brovana and nebulized steroids: Pulmicort BID  · Continue BIPAP qhs to improve patient tolerance and help with work of breathing.   Advise BIPAP 14/9 cm H20   · Continue Singulair 10 mg Q day  · Continue Albuterol PRN  · Change scheduled DuoNeb to Atrovent neb Q6 horus  · IS at bedside  · Continue supplemental O2 to 2 L/min SpO2 .87-94%  · Nutrition: per primary team  · HOB >=30 degree, aggressive pulmonary toileting, incentive spirometry, PT/OT eval and treat  · GI Prophylaxis: per primary team: continue outpatient PPI  · DVT Prophylaxis: per primary team :on lovenox  · Glycemic control per primary team  ·   · Case management Consult- Can we see if we can get authorization for patient to restart Spiriva Respimat instead of Incruse-   ·   · Further recommendations will be based on the patient's response to recommended treatment and results of the investigation ordered. Subjective/History:     Hillary Hopkins is a 61 y.o. female with PMHx significant for COPD/asthma overlap, mild SHERRIE on CPAP therapy, obesity who has been admitted to [facility] for failed outpatient therapy for COPD exacerbation. Patient has moderate to severe COPD with frequent exacerbations and a significant asthma component as she had dramatic bronchodilator response with her last PFTs who initially presented 28JUN this year to ED with symptoms consistent with exacerbation and was sent home with 3 day course of prednisone, course of abx and follow-up plan with her pulmonologist, Dr. Alfred Espinoza. Her pulmonary appointment was cancelled because of provider sickness and she was seen 2 days ago by [facility] who gave more prednisone and instructed her to use duoneb every 6 hours. She continued to struggle with severe chest tightness and dyspnea prompting ED presentation last night. Denies fever, chills, night sweats. Not using CPAP recently due to difficulty breathing. 07/09/18    Patient reports much improvement in her dyspnea and slept well on BIPAP settings. Improved air movement on lung exam.    Ms. Lyla Jarvis is followed by Dr. Alfred Espinoza for her COPD-Asthma and I see her for her SHERRIE. The patient feels that her exacerbations have increased since she was [facility] [address] [facility] and that over the past year, this is the worst control she has had in a long time. She had to change her inhalers due to insurance.     Pets:none at home    Smoking: denies and states she is avoiding smoking    She does not feel she is able to fulling inhaler the Incruse. She does have GERD which is severe if she does not take her PPI. She reports that she is strictly taking her inhalers and PPI and she sometimes needs to take an additional H2- blocker. She has had some maxillary sinus fullness, + clear nasal discharge. No sputum production but she feels chest congestion    In regards to home CPAP- she has been having intolerance issues- was supposed to go to her DME ([facility]) for a mask refit. She has not done that due to increase family stressors. Father  last month in [place]. She states that she feels that she tolerates BIPAP much better and that she is able to exhale much easier. Allergies   Allergen Reactions    Ancef [Cefazolin] Hives    Contrast Agent [Iodine] Anaphylaxis, Shortness of Breath and Swelling     Needs pre-medication for IV contrast with Benadryl, Solu-Medrol    Fish Containing Products Anaphylaxis     Pt states she had a severe allergic reaction at 7 y/o.  Metformin Other (comments)     Abdominal pain, diarrhea.     Codeine Other (comments)     Altered mental status        Past Medical History:   Diagnosis Date    Asthma     COPD     Cystocele, midline     Diabetes mellitus ([address]     GERD (gastroesophageal reflux disease)     Hidradenitis suppurativa     Hyperlipidemia     Hypertension     SHERRIE on CPAP     CPAP    Stress incontinence         Past Surgical History:   Procedure Laterality Date    BREAST SURGERY PROCEDURE UNLISTED      Right breast benign tumor removal    HX APPENDECTOMY      HX CHOLECYSTECTOMY      HX DILATION AND CURETTAGE      Dysfunctional uterine bleeding, thought 2/2 fibroids    HX TUBAL LIGATION          Family History   Problem Relation Age of Onset    Hypertension Mother     Stroke Mother     Breast Cancer Mother      Bilateral mastectomies    Cancer Mother      ovarian and breast    Heart Failure Mother     Heart Attack Father          Heart Surgery Father CABG    Heart Failure Father     COPD Sister      Heavy smoker    Cancer Sister      ovarian    Heart Failure Sister     Lung Disease Sister     Asthma Child     Cancer Maternal Aunt      pancreatic    Cancer Maternal Grandfather      stomach        Social History   Substance Use Topics    Smoking status: Former Smoker     Packs/day: 1.00     Years: 30.00     Types: Cigarettes     Start date: 5/6/1966     Quit date: 9/6/2006    Smokeless tobacco: Never Used      Comment: 1-1.5 packs per day    Alcohol use No        Prior to Admission medications    Medication Sig Start Date End Date Taking? Authorizing Provider   insulin NPH/insulin regular (NOVOLIN 70/30 U-100 INSULIN) 100 unit/mL (70-30) injection by SubCUTAneous route. 8 units if blood sugar is 200-300 over 300 12 units   Yes Historical Provider   insulin glargine (LANTUS,BASAGLAR) 100 unit/mL (3 mL) inpn 28 Units by SubCUTAneous route nightly. Indications: type 2 diabetes mellitus  Patient taking differently: 25 Units by SubCUTAneous route nightly. Indications: type 2 diabetes mellitus 3/15/18  Yes Sophia Vera MD   umeclidinium (INCRUSE ELLIPTA) 62.5 mcg/actuation inhaler Take 1 Puff by inhalation daily. Indications: BRONCHOSPASM PREVENTION WITH COPD 3/15/18  Yes Sophia Vera MD   Diabetic Supplies, [address] [facility] Diabetic needles 1/2 inch 31 gauge - 1 injection daily 3/15/18  Yes Sophia Vera MD   Diabetic Supplies, [address] [facility] Diabetic test strips - use four times daily 3/15/18  Yes Sophia Vera MD   Diabetic Supplies, [address] [facility] Diabetic lancets - use three times daily 3/15/18  Yes Sophia Vera MD   ipratropium (ATROVENT) 0.02 % soln 2.5 mL by Nebulization route four (4) times daily as needed. Indications: BRONCHOSPASM PREVENTION WITH COPD 3/15/18  Yes Sophia Vera MD   albuterol (PROVENTIL VENTOLIN) 2.5 mg /3 mL (0.083 %) nebulizer solution 3 mL by Nebulization route every four (4) hours as needed for Wheezing. Indications: BRONCHOSPASM PREVENTION, Chronic Obstructive Pulmonary Disease 3/15/18  Yes Prasad Hardy MD   omeprazole (PRILOSEC) 40 mg capsule Take 40 mg by mouth daily. Indications: gastroesophageal reflux disease   Yes Historical Provider   lisinopril (PRINIVIL, ZESTRIL) 40 mg tablet Take 20 mg by mouth two (2) times a day. Indications: hypertension   Yes Historical Provider   simethicone (MYLICON) 80 mg chewable tablet Take 80 mg by mouth every six (6) hours as needed for Flatulence. Yes Historical Provider   cpap machine kit by Does Not Apply route nightly. Yes Historical Provider   OXYGEN-AIR DELIVERY SYSTEMS 2 L by Nasal route continuous. [employer]   Yes Historical Provider   fluticasone-salmeterol (ADVAIR HFA) 930-60 mcg/actuation inhaler Take 2 Puffs by inhalation two (2) times a day. Yes Historical Provider   Diabetic [address]. [facility] Diabetic syringes 1 mL - use one daily 2/14/17  Yes Jada Knox,    albuterol (PROVENTIL HFA, VENTOLIN HFA, PROAIR HFA) 90 mcg/actuation inhaler Take 2 Puffs by inhalation every four (4) hours as needed for Wheezing. For the next 2 days after hospital discharge, use your inhaler 4 times a day. 3/9/16  Yes Nakul Garza MD   Inhalational Spacing Device (AEROCHAMBER) 1 Each by Does Not Apply route as needed for Cough or Other (COPD exacerbation). 10/2/12  Yes Danitza South, DO   fluticasone (FLONASE) 50 mcg/actuation nasal spray 2 Sprays by Both Nostrils route daily. Yes Historical Provider   montelukast (SINGULAIR) 10 mg tablet Take 10 mg by mouth nightly. Yes Lauren Allison MD   aspirin delayed-release 81 mg tablet Take 81 mg by mouth daily. Historical Provider   famotidine (PEPCID) 20 mg tablet Take 20 mg by mouth two (2) times daily as needed.     Historical Provider       Current Facility-Administered Medications   Medication Dose Route Frequency    predniSONE (DELTASONE) tablet 60 mg  60 mg Oral DAILY WITH BREAKFAST    insulin glargine (LANTUS) injection 33 Units  33 Units SubCUTAneous QHS    acetaminophen (TYLENOL) tablet 650 mg  650 mg Oral Q4H PRN    enoxaparin (LOVENOX) injection 40 mg  40 mg SubCUTAneous Q24H    montelukast (SINGULAIR) tablet 10 mg  10 mg Oral QHS    fluticasone (FLONASE) 50 mcg/actuation nasal spray 2 Spray  2 Spray Both Nostrils DAILY    aspirin delayed-release tablet 81 mg  81 mg Oral DAILY    lisinopril (PRINIVIL, ZESTRIL) tablet 20 mg  20 mg Oral BID    simethicone (MYLICON) tablet 80 mg  80 mg Oral Q6H PRN    pantoprazole (PROTONIX) tablet 40 mg  40 mg Oral ACB    insulin lispro (HUMALOG) injection   SubCUTAneous AC&HS    glucose chewable tablet 16 g  4 Tab Oral PRN    glucagon (GLUCAGEN) injection 1 mg  1 mg IntraMUSCular PRN    dextrose (D50W) injection syrg 12.5-25 g  25-50 mL IntraVENous PRN    albuterol (PROVENTIL VENTOLIN) nebulizer solution 2.5 mg  2.5 mg Nebulization Q4H PRN    albuterol-ipratropium (DUO-NEB) 2.5 MG-0.5 MG/3 ML  3 mL Nebulization Q4H RT    arformoterol (BROVANA) neb solution 15 mcg  15 mcg Nebulization BID RT    budesonide (PULMICORT) 500 mcg/2 ml nebulizer suspension  500 mcg Nebulization BID RT         Objective:   Vital Signs:    Visit Vitals    /75 (BP 1 Location: Left arm, BP Patient Position: Sitting)    Pulse 65    Temp 97.8 °F (36.6 °C)    Resp 18    Ht 5' 3\" (1.6 m)    Wt 115.2 kg (254 lb)    SpO2 98%    BMI 44.99 kg/m2       O2 Device: Nasal cannula   O2 Flow Rate (L/min): 3 l/min   Temp (24hrs), Av.4 °F (36.3 °C), Min:95.1 °F (35.1 °C), Max:98.6 °F (37 °C)       Intake/Output:   Last shift:         Last 3 shifts:  1901 -  0700  In: 840 [P.O.:840]  Out: -     Intake/Output Summary (Last 24 hours) at 18 0941  Last data filed at 18 1316   Gross per 24 hour   Intake              480 ml   Output                0 ml   Net              480 ml       Physical Exam:     General:  Alert, Awake, NAD, pleasant.   Mild breathlessness with speaking. Head:   Normocephalic, without obvious abnormality, atraumatic. Eye:   Conjunctivae/corneas clear. PERRL, no scleral icterus, no pallor, no cyanosis  Nose:   Nares normal. Septum midline. Mucosa normal without erythema/exudate. No drainage/discharge. No sinus tenderness. Throat:  Lips, mucosa, and tongue normal. OP: MP 3-4  Neck:   Supple, symmetric, thyroid: no enlargement/tenderness/nodule, no JVD, no carotid bruit, no lymphadenopathy. Trachea midline  Back & spine: Symmetric, no curvature. Chest wall: No tenderness or deformity. No rash  Lung:   Diminished but much improved air movement with end-expiratory wheezes. Heart:   Regular rate & rhythm. S1 S2 present, no murmur, no gallop, no click, no rub  Abdomen:  Soft, NT, ND, +BS, no masses, no organomegaly  Extremities:  No pedal edema, no cyanosis, no clubbing  Pulses: 2+ and symmetric in DP  Lymphatic:  No cervical, supraclavicular and axillary palpable lymphadenopathy. Musculoskeletal: No joint swelling or tenderness. Neurologic:  Grossly non focal.        Data:         Chemistry Recent Labs      07/09/18   0235  07/08/18   0220  07/07/18   0515   GLU  313*  303*  325*   NA  137  139  137   K  4.6  4.5  4.5   CL  103  104  102   CO2  26  25  26   BUN  20*  17  12   CREA  1.16  1.13  1.05   CA  9.1  9.0  9.1   AGAP  8  10  9   BUCR  17  15  11*        Lactic Acid Lactic acid   Date Value Ref Range Status   02/10/2017 1.9 0.4 - 2.0 MMOL/L Final     No results for input(s): LAC in the last 72 hours. Liver Enzymes Protein, total   Date Value Ref Range Status   06/02/2018 7.6 6.4 - 8.2 g/dL Final     Albumin   Date Value Ref Range Status   06/02/2018 3.7 3.4 - 5.0 g/dL Final     Globulin   Date Value Ref Range Status   06/02/2018 3.9 2.0 - 4.0 g/dL Final     A-G Ratio   Date Value Ref Range Status   06/02/2018 0.9 0.8 - 1.7   Final     AST (SGOT)   Date Value Ref Range Status   06/02/2018 23 15 - 37 U/L Final     Alk.  phosphatase   Date Value Ref Range Status   06/02/2018 86 45 - 117 U/L Final     No results for input(s): TP, ALB, GLOB, AGRAT, SGOT, GPT, AP, TBIL in the last 72 hours. No lab exists for component: DBIL     CBC w/Diff Recent Labs      07/09/18   0235  07/08/18   0220  07/07/18   0515   WBC  12.9  10.2  5.6   RBC  3.88*  4.00*  4.39   HGB  11.4*  11.9*  13.1   HCT  34.5*  35.4  38.4   PLT  273  249  255   GRANS  91*  92*  85*   LYMPH  6*  6*  14*   EOS  0  0  0        Cardiac Enzymes No results found for: CPK, CK, CKMMB, CKMB, RCK3, CKMBT, CKNDX, CKND1, KESHIA, TROPT, TROIQ, JOEL, TROPT, TNIPOC, BNP, BNPP     BNP No results found for: BNP, BNPP, XBNPT     Coagulation No results for input(s): PTP, INR, APTT in the last 72 hours.     No lab exists for component: INREXT, INREXT      Thyroid  Lab Results   Component Value Date/Time    TSH 2.76 09/18/2016 02:20 PM          Lipid Panel Lab Results   Component Value Date/Time    Cholesterol, total 220 (H) 11/20/2012 03:22 AM    HDL Cholesterol 62 (H) 11/20/2012 03:22 AM    LDL, calculated 144.6 (H) 11/20/2012 03:22 AM    VLDL, calculated 13.4 11/20/2012 03:22 AM    Triglyceride 67 11/20/2012 03:22 AM    CHOL/HDL Ratio 3.5 11/20/2012 03:22 AM        ABG Recent Labs      07/07/18   0216   PHI  7.452*   PCO2I  46.6*   PO2I  110*   HCO3I  32.5*   FIO2I  0.28        Urinalysis Lab Results   Component Value Date/Time    Color YELLOW 02/08/2017 03:30 PM    Appearance CLEAR 02/08/2017 03:30 PM    Specific gravity 1.026 02/08/2017 03:30 PM    pH (UA) 5.0 02/08/2017 03:30 PM    Protein NEGATIVE  02/08/2017 03:30 PM    Glucose >1000 (A) 02/08/2017 03:30 PM    Ketone TRACE (A) 02/08/2017 03:30 PM    Bilirubin NEGATIVE  02/08/2017 03:30 PM    Urobilinogen 0.2 02/08/2017 03:30 PM    Nitrites NEGATIVE  02/08/2017 03:30 PM    Leukocyte Esterase NEGATIVE  02/08/2017 03:30 PM    Epithelial cells FEW 11/19/2012 02:01 PM    Bacteria FEW (A) 02/29/2012 04:26 AM    WBC 0 to 1 11/19/2012 02:01 PM    RBC NONE 11/19/2012 02:01 PM        Micro  No results for input(s): SDES, CULT in the last 72 hours. No results for input(s): CULT in the last 72 hours. XR (Most Recent). CXR reviewed by me and compared with previous CXR   Results from Hospital Encounter encounter on 07/06/18   XR CHEST PA LAT   Narrative Examination: PA and lateral chest     History: Shortness of breath    Comparison: June 28, 2018    Findings: There is stable linear scarring in the right lung base. There is no  pleural effusion, pulmonary edema, or pneumothorax. No definite focal  consolidation is seen. Cardiomegaly is stable. Mild atherosclerosis of aortic  arch. Degenerative changes of the spine. Impression Impression:  1. No acute process. Scarring in the right lung base. CT (Most Recent)   Results from Hospital Encounter encounter on 02/21/18   CT CHEST WO CONT   Narrative CT CHEST WITHOUT ENHANCEMENT    INDICATION: Shortness of breath, COPD, smoking history, history of lung nodule  undergoing follow-up. TECHNIQUE: Axial images obtained from the thoracic inlet to the level of the  diaphragm without intravenous contrast. Coronal and sagittal reformatted images  were obtained. All CT scans at this facility are performed using dose optimization technique as  appropriate to a performed exam, to include automated exposure control,  adjustment of the mA and/or kV according to patient size (including appropriate  matching first site-specific examinations), or use of iterative reconstruction  technique. COMPARISON: 9/5/2013. CHEST FINDINGS:   The evaluation of the mediastinum, hilum and vascular structures is limited in  the absence of intravenous contrast.      Thyroid: Unremarkable in its visualized aspects. Pericardium/ Heart: Heart size is normal. No pericardial effusion. Moderate LAD  coronary arteriosclerosis. Aorta/ Vessels: No aneurysm. Mild atherosclerosis. Lymph Nodes: Unremarkable. .    Lungs: Trachea and central bronchial tree are patent. Mild centrilobular  emphysema and central bronchial wall thickening. Similar mild scarring in the  right middle lobe. Pleura: No effusion. Upper Abdomen: There is moderate hepatic steatosis throughout. Suspect  hepatomegaly but the caudal liver is not included in the field-of-view. More  significant abdominal atherosclerosis with coarse calcification at the limited  visualized ostia. .    Bones/soft tissues: Degenerative disc disease. Impression IMPRESSION:    Mild emphysema and bronchitis. Moderate LAD coronary arteriosclerosis. Moderate hepatic steatosis and possible hepatomegaly but caudal liver is not  included in the field-of-view. EKG No results found for this or any previous visit. ECHO 7/7/18:  · Definity contrast was given to enhance imaging. · Left ventricular hyperdynamic systolic function. Estimated left ventricular ejection fraction is 66 - 70%. Left ventricular mild concentric hypertrophy observed. Normal left ventricular wall motion, no regional wall motion abnormality noted. Age-appropriate left ventricular diastolic function. · Right ventricle not well visualized. · Aortic valve is probably trileaflet. PFT No flowsheet data found.      Other ASA reactivity:   Pre-albumin:   Ionized Calcium:   NH4:   T3, FT4:  Cortisol:  Urine Osm:  Urine Lytes:   HbA1c:      Recent Results (from the past 24 hour(s))   GLUCOSE, POC    Collection Time: 07/08/18 11:16 AM   Result Value Ref Range    Glucose (POC) 345 (H) 70 - 110 mg/dL   GLUCOSE, POC    Collection Time: 07/08/18  3:43 PM   Result Value Ref Range    Glucose (POC) 412 (HH) 70 - 110 mg/dL   GLUCOSE, POC    Collection Time: 07/08/18  9:38 PM   Result Value Ref Range    Glucose (POC) 367 (H) 70 - 110 mg/dL   CBC WITH AUTOMATED DIFF    Collection Time: 07/09/18  2:35 AM   Result Value Ref Range    WBC 12.9 4.6 - 13.2 K/uL    RBC 3.88 (L) 4.20 - 5.30 M/uL    HGB 11.4 (L) 12.0 - 16.0 g/dL    HCT 34.5 (L) 35.0 - 45.0 %    MCV 88.9 74.0 - 97.0 FL    MCH 29.4 24.0 - 34.0 PG    MCHC 33.0 31.0 - 37.0 g/dL    RDW 13.9 11.6 - 14.5 %    PLATELET 419 218 - 358 K/uL    MPV 9.4 9.2 - 11.8 FL    NEUTROPHILS 91 (H) 40 - 73 %    LYMPHOCYTES 6 (L) 21 - 52 %    MONOCYTES 3 3 - 10 %    EOSINOPHILS 0 0 - 5 %    BASOPHILS 0 0 - 2 %    ABS. NEUTROPHILS 11.7 (H) 1.8 - 8.0 K/UL    ABS. LYMPHOCYTES 0.8 (L) 0.9 - 3.6 K/UL    ABS. MONOCYTES 0.4 0.05 - 1.2 K/UL    ABS. EOSINOPHILS 0.0 0.0 - 0.4 K/UL    ABS. BASOPHILS 0.0 0.0 - 0.1 K/UL    DF AUTOMATED     METABOLIC PANEL, BASIC    Collection Time: 07/09/18  2:35 AM   Result Value Ref Range    Sodium 137 136 - 145 mmol/L    Potassium 4.6 3.5 - 5.5 mmol/L    Chloride 103 100 - 108 mmol/L    CO2 26 21 - 32 mmol/L    Anion gap 8 3.0 - 18 mmol/L    Glucose 313 (H) 74 - 99 mg/dL    BUN 20 (H) 7.0 - 18 MG/DL    Creatinine 1.16 0.6 - 1.3 MG/DL    BUN/Creatinine ratio 17 12 - 20      GFR est AA 58 (L) >60 ml/min/1.73m2    GFR est non-AA 48 (L) >60 ml/min/1.73m2    Calcium 9.1 8.5 - 10.1 MG/DL   GLUCOSE, POC    Collection Time: 07/09/18  8:08 AM   Result Value Ref Range    Glucose (POC) 271 (H) 70 - 110 mg/dL         The patient is: [x] acutely ill Risk of deterioration: [x] moderate    [] critically ill  [] high     [x]See my orders for details      [] Total clinical care time exclusive of procedures 45 minutes with complex decision making, coordination of care and counseling patient performed and > 50% time spent in face to face evaluation.       [address] [place] [address],[id number], DO  7/9/2018

## 2018-07-09 NOTE — PROGRESS NOTES
conducted an initial consultation and Spiritual Assessment for Galindo Matthews, who is a 61 y.o.,female. Patients Primary Language is: [place]. According to the patients EMR Moravian Affiliation is: Brian Rashid.     The reason the Patient came to the hospital is:   Patient Active Problem List    Diagnosis Date Noted    Type 2 diabetes with nephropathy ([facility] 05/30/2018    Hyperlipidemia 01/24/2018    COPD with acute bronchitis ([address] 00/59/9288    Diastolic CHF, chronic ([address] 02/09/2017    Diverticulosis 02/09/2017    COPD exacerbation ([address] 02/08/2017    Acute exacerbation of COPD with asthma ([facility] 02/08/2017    Obesity, Class III, BMI 40-49.9 (morbid obesity) ([address] 08/09/2016    Chest pain 08/09/2016    Dyspnea 08/09/2016    COPD with acute exacerbation ([address] 05/24/2015    COPD (chronic obstructive pulmonary disease) (HCC) 03/27/2015    Allergic rhinitis 03/27/2015    Essential hypertension, benign 09/30/2012    Diabetes mellitus, type 2 ([address] 09/30/2012    Esophageal reflux 09/30/2012    SHERRIE on CPAP         The  provided the following Interventions:  Initiated a relationship of care and support. Explored issues of scout, spirituality and/or Adventism needs while hospitalized. Listened empathically. Provided chaplaincy education. Provided information about Spiritual Care Services. Offered prayer and assurance of continued prayers on patient's behalf. Chart reviewed. The following outcomes were achieved:  Patient shared some information about their medical narrative and spiritual journey/beliefs. Patient processed feeling about current hospitalization. Patient expressed gratitude for the 's visit. Assessment:  Patient did not indicate any spiritual or Adventism issues which require Spiritual Care Services interventions at this time.    Patient does not have any Adventism/cultural needs that will affect patients preferences in health care.    Plan:  Chaplains will continue to follow and will provide pastoral care on an as needed or requested basis.  recommends bedside caregivers page  on duty if patient shows signs of acute spiritual or emotional distress.   Elmer Porter, [address] [place] [address]  Spiritual Care  [phone]

## 2018-07-09 NOTE — PROGRESS NOTES
Problem: Mobility Impaired (Adult and Pediatric)  Goal: *Acute Goals and Plan of Care (Insert Text)  Physical Therapy Goals  Initiated 7/7/2018 and to be accomplished within 7 day(s)  1. Patient will move from supine to sit and sit to supine  in bed with modified independence. 2.  Patient will transfer from bed to chair and chair to bed with modified independence using the least restrictive device. 3.  Patient will perform sit to stand with modified independence. 4.  Patient will ambulate with modified independence for 50 feet with the least restrictive device. 5.  Patient will ascend/descend 2 stairs with 1 handrail(s) with minimal assistance/contact guard assist.   Outcome: Progressing Towards Goal  physical Therapy TREATMENT    Patient: Hillary Hopkins (79 y.o. female)  Date: 7/9/2018  Diagnosis: COPD exacerbation ([address] COPD exacerbation ([facility] [address]       Precautions:     Chart, physical therapy assessment, plan of care and goals were reviewed. OBJECTIVE/ ASSESSMENT:  Patient found seated in b/s chair willing to work with PT. Pt stood to  and ambulated into [address] with multiple standing rest breaks due to fatigue and SOB. Educated pt on activity pacing and breathing techniques. Pt returned to b/s chair and performed seated therex (LAQ's, heel/toe taps, and marching x10 bilaterally). Pt was on 3L O2 throughout tx. Pt was left seated in b/s chair with all needs in reach. Education: therex, gait, transfers, breathing techniques, activity pacing. Progression toward goals:  []      Improving appropriately and progressing toward goals  []      Improving slowly and progressing toward goals  []      Not making progress toward goals and plan of care will be adjusted     PLAN:  Patient continues to benefit from skilled intervention to address the above impairments. Continue treatment per established plan of care.   Discharge Recommendations:  Home Health  Further Equipment Recommendations for Discharge:  rolling walker     SUBJECTIVE:   Patient stated I feel pretty winded    OBJECTIVE DATA SUMMARY:   Transfers:  Sit to Stand: Stand-by assistance  Stand to Sit: Stand-by assistance  Balance:  Sitting: Intact  Standing: Impaired; With support  Standing - Static: Good  Standing - Dynamic : Fair  Ambulation/Gait Training:  Distance (ft): 90 Feet (ft)  Assistive Device: Walker, rolling  Ambulation - Level of Assistance: Stand-by assistance  Gait Abnormalities: Decreased step clearance  Base of Support: Narrowed  Speed/Pam: Pace decreased (<100 feet/min)  Step Length: Left shortened;Right shortened  Therapeutic Exercises: see above      EXERCISE   Sets   Reps   Active Active Assist   Passive Self- assited ROM   Comments   Ankle Pumps    [] [] [] []    Quad Sets   [] [] [] []    Glut Sets   [] [] [] []    Short Arc Quads   [] [] [] []    Heel Slides   [] [] [] []    Straight Leg Raises   [] [] [] []    Hip Abd   [] [] [] []    Long Arc Quads   [] [] [] []    Seated Marching   [] [] [] []    Seated Knee Flexion   [] [] [] []    Standing Marching   [] [] [] []      Pain:  Pre tx pain:  0/10  Post tx pain: 0/10  Activity Tolerance:   fair  Please refer to the flowsheet for vital signs taken during this treatment. After treatment:   [x] Patient left in no apparent distress sitting up in chair  [] Patient left in no apparent distress in bed  [x] Call bell left within reach  [x] Nursing notified  [] Caregiver present  [] Bed alarm activated  [] SCDs applied  [] Ice applied      Lauren E D'Amico, PTA   Time Calculation: 17 mins    Mobility  Current  CI= 1-19%. The severity rating is based on the Level of Assistance required for Functional Mobility and ADLs. Mobility   Goal  CI= 1-19%. The severity rating is based on the Level of Assistance required for Functional Mobility and ADLs.

## 2018-07-09 NOTE — PROGRESS NOTES
1864 Offered use of bipap/cpap machine [id number] 7/08/18 and 0000 7/09/118. She indicated that she was not going to use it until after her midnight treatments.

## 2018-07-09 NOTE — DIABETES MGMT
Diabetes Patient/Family Education Record    Factors That  May Influence Patients Ability  to Learn or  Comply with Recommendations   []   Language barrier    []   Cultural needs   []   Motivation    []   Cognitive limitation    []   Physical   [x]   Education    []   Physiological factors   []   Hearing/vision/speaking impairment   []   Caodaism beliefs    []   Financial factors   []  Other:   []  No factors identified at this time. Person Instructed:   [x]   Patient   []   Family   []  Other     Preference for Learning:   [x]   Verbal   []   Written   []  Demonstration     Level of Comprehension & Competence:    [x]  Good                                      [] Fair                                     []  Poor                             []  Needs Reinforcement   [x]  Teachback completed    Education Component:   [x]  Medication management, including how to administer insulin (if appropriate) and potential medication interactions: Patient stated that she is on the following diabetes meds at home:  Lantus (Basaglar, pen) insulin 25 units daily at bedtime. Novolog sliding scale insulin. [x]  Nutritional management: Discussed with patient about the recommended serving size/portion control of carbs (starches, fruits, dairy) and limiting intake of concentrated sweets. Patient stated that she knows how much to consume to help prevent her blood sugar from going up. [x]  Exercise: Patient stated that she's able to tolerate short (household) distance of regular walking exercise when not feeling sick. [x]  Signs, symptoms, and treatment of hyperglycemia and hypoglycemia   [x] Prevention, recognition and treatment of hyperglycemia and hypoglycemia   [x]  Importance of blood glucose monitoring and how to obtain a blood glucose meter: Patient stated that she has BG meter and testing supplies.  She is checking her blood sugar regularly at home.    []  Instruction on use of the blood glucose meter   [x]  Discuss the importance of HbA1C monitoring: Discussed with patient about her current A1c level of 7.9% (07/06/20180 which is equivalent to estimated average blood glucose of 180 mg/dL during the past 2-3 months. Patient stated that she has been staying in the 7% range and plan to continue to follow her diabetes treatment plan.    []  Sick day guidelines   [x]  Proper use and disposal of lancets, needles, syringes or insulin pens (if appropriate)   []  Potential long-term complications (retinopathy, kidney disease, neuropathy, foot care)   [x] Information about whom to contact in case of emergency or for more information    [x]  Goal:  Patient/family will demonstrate understanding of Diabetes Self Management Skills by: 07/16/2018. Plan for post-discharge education or self-management support:    [] Outpatient class schedule provided            [x] Patient Declined: Patient stated that she is well educated about her diabetes management through PCP.     [] Scheduled for outpatient classes (date) _______     Lisa Richardson RN  pgr [phone]

## 2018-07-09 NOTE — DIABETES MGMT
Glycemic Control Plan of Care    T2DM with current A1c of 7.9% (07/06/2018). See separate notes regarding assessment of home diabetes management and education, 07/09/2018. Home diabetes meds: Lantus (Basaglar, pen) 25 units daily at bedtime and novolog sliding scale insulin. BG still above target range after increasing lantus dose from 25 to 33 units. Patient also received a total of 51 units of correctional lispro insulin. POC BG range on 07/08/2018: 313-367 mg/dL. POC BG report on 07/09/2018 at time of review: 271, 304 mg/dL. Noted patient received a total of 185 mg of IV Solumedrol on 07/08/2018 and changed to prednisone 60 mg daily starting 07/09/21018. Recommendation(s):  1.) Continue monitoring and consider increasing basal lantus insulin dose to 40 units if BG remain above target range. Assessment:  Patient is 61year old with past medical history including type 2 diabetes mellitus, asthma, COPD, GERD, hyperlipidemia, hypertension, and SHERRIE on Cpap - was admitted on 07/07/2018 with report of increased shortness of breath. Noted:  Severe COPD (stage 3) with significant asthma. T2DM with current A1c of 7.9% (07/06/2018). Most recent blood glucose values:    Results for Lewanda Sandifer (MRN [id number]) as of 7/9/2018 15:38   Ref. Range 7/8/2018 07:48 7/8/2018 11:16 7/8/2018 15:43 7/8/2018 21:38   GLUCOSE,FAST - POC Latest Ref Range: 70 - 110 mg/dL 313 (H) 345 (H) 412 (HH) 367 (H)     Results for Lewanda Sandifer (MRN [id number]) as of 7/9/2018 15:38   Ref. Range 7/9/2018 08:08 7/9/2018 11:52   GLUCOSE,FAST - POC Latest Ref Range: 70 - 110 mg/dL 271 (H) 304 (H)     Current A1C: 7.9% (07/06/2018) is equivalent to estimated average blood glucose of 180 mg/dL during the past 2-3 months. Current hospital diabetes medications:  Basal lantus insulin 33 units daily at bedtime. Correctional lispro insulin ACHS. Very resistant dose.     Total daily dose insulin requirement previous day: 07/08/2018:  Lantus: 33 units  Lispro: 51 units  TDD: 84 units of insulin    Home diabetes medications: Patient reported on 07/09/2018:  Lantus (Basaglar, pen) insulin 25 units daily at bedtime. Novolog sliding scale insulin. Diet: Diabetic consistent carb regular. Goals:  Blood glucose will be within target range of  mg/dL by 07/12/2018.     Education:  __X_  Refer to Diabetes Education Record: 07/09/2018.             ___  Education not indicated at this time    Nakita Randhawa RN [facility]  Pager: [phone]

## 2018-07-10 ENCOUNTER — HOME HEALTH ADMISSION (OUTPATIENT)
Dept: HOME HEALTH SERVICES | Facility: HOME HEALTH | Age: 58
End: 2018-07-10

## 2018-07-10 ENCOUNTER — APPOINTMENT (OUTPATIENT)
Dept: GENERAL RADIOLOGY | Age: 58
DRG: 191 | End: 2018-07-10
Attending: INTERNAL MEDICINE
Payer: MEDICARE

## 2018-07-10 VITALS
BODY MASS INDEX: 45 KG/M2 | HEIGHT: 63 IN | OXYGEN SATURATION: 99 % | TEMPERATURE: 97 F | WEIGHT: 254 LBS | RESPIRATION RATE: 18 BRPM | HEART RATE: 61 BPM | DIASTOLIC BLOOD PRESSURE: 84 MMHG | SYSTOLIC BLOOD PRESSURE: 157 MMHG

## 2018-07-10 LAB
ANION GAP SERPL CALC-SCNC: 4 MMOL/L (ref 3–18)
BASOPHILS # BLD: 0 K/UL (ref 0–0.06)
BASOPHILS NFR BLD: 0 % (ref 0–2)
BUN SERPL-MCNC: 20 MG/DL (ref 7–18)
BUN/CREAT SERPL: 19 (ref 12–20)
CALCIUM SERPL-MCNC: 9.3 MG/DL (ref 8.5–10.1)
CHLORIDE SERPL-SCNC: 102 MMOL/L (ref 100–108)
CO2 SERPL-SCNC: 31 MMOL/L (ref 21–32)
CREAT SERPL-MCNC: 1.05 MG/DL (ref 0.6–1.3)
DIFFERENTIAL METHOD BLD: ABNORMAL
EOSINOPHIL # BLD: 0 K/UL (ref 0–0.4)
EOSINOPHIL NFR BLD: 0 % (ref 0–5)
ERYTHROCYTE [DISTWIDTH] IN BLOOD BY AUTOMATED COUNT: 13.4 % (ref 11.6–14.5)
GLUCOSE BLD STRIP.AUTO-MCNC: 126 MG/DL (ref 70–110)
GLUCOSE BLD STRIP.AUTO-MCNC: 232 MG/DL (ref 70–110)
GLUCOSE SERPL-MCNC: 247 MG/DL (ref 74–99)
HCT VFR BLD AUTO: 36.9 % (ref 35–45)
HGB BLD-MCNC: 12.2 G/DL (ref 12–16)
LYMPHOCYTES # BLD: 2.8 K/UL (ref 0.9–3.6)
LYMPHOCYTES NFR BLD: 24 % (ref 21–52)
MCH RBC QN AUTO: 29.7 PG (ref 24–34)
MCHC RBC AUTO-ENTMCNC: 33.1 G/DL (ref 31–37)
MCV RBC AUTO: 89.8 FL (ref 74–97)
MONOCYTES # BLD: 0.7 K/UL (ref 0.05–1.2)
MONOCYTES NFR BLD: 7 % (ref 3–10)
NEUTS SEG # BLD: 7.9 K/UL (ref 1.8–8)
NEUTS SEG NFR BLD: 69 % (ref 40–73)
PLATELET # BLD AUTO: 290 K/UL (ref 135–420)
PMV BLD AUTO: 9.4 FL (ref 9.2–11.8)
POTASSIUM SERPL-SCNC: 3.9 MMOL/L (ref 3.5–5.5)
RBC # BLD AUTO: 4.11 M/UL (ref 4.2–5.3)
SODIUM SERPL-SCNC: 137 MMOL/L (ref 136–145)
WBC # BLD AUTO: 11.4 K/UL (ref 4.6–13.2)

## 2018-07-10 PROCEDURE — 74011000250 HC RX REV CODE- 250: Performed by: INTERNAL MEDICINE

## 2018-07-10 PROCEDURE — 85025 COMPLETE CBC W/AUTO DIFF WBC: CPT | Performed by: FAMILY MEDICINE

## 2018-07-10 PROCEDURE — 94640 AIRWAY INHALATION TREATMENT: CPT

## 2018-07-10 PROCEDURE — 77010033678 HC OXYGEN DAILY: Performed by: FAMILY MEDICINE

## 2018-07-10 PROCEDURE — 74011636637 HC RX REV CODE- 636/637: Performed by: STUDENT IN AN ORGANIZED HEALTH CARE EDUCATION/TRAINING PROGRAM

## 2018-07-10 PROCEDURE — 74018 RADEX ABDOMEN 1 VIEW: CPT

## 2018-07-10 PROCEDURE — 82785 ASSAY OF IGE: CPT | Performed by: INTERNAL MEDICINE

## 2018-07-10 PROCEDURE — 86003 ALLG SPEC IGE CRUDE XTRC EA: CPT | Performed by: INTERNAL MEDICINE

## 2018-07-10 PROCEDURE — 97165 OT EVAL LOW COMPLEX 30 MIN: CPT

## 2018-07-10 PROCEDURE — 36415 COLL VENOUS BLD VENIPUNCTURE: CPT | Performed by: FAMILY MEDICINE

## 2018-07-10 PROCEDURE — 82962 GLUCOSE BLOOD TEST: CPT

## 2018-07-10 PROCEDURE — 74011250636 HC RX REV CODE- 250/636: Performed by: STUDENT IN AN ORGANIZED HEALTH CARE EDUCATION/TRAINING PROGRAM

## 2018-07-10 PROCEDURE — 74011250637 HC RX REV CODE- 250/637: Performed by: STUDENT IN AN ORGANIZED HEALTH CARE EDUCATION/TRAINING PROGRAM

## 2018-07-10 PROCEDURE — 80048 BASIC METABOLIC PNL TOTAL CA: CPT | Performed by: FAMILY MEDICINE

## 2018-07-10 RX ORDER — INSULIN GLARGINE 100 [IU]/ML
30 INJECTION, SOLUTION SUBCUTANEOUS
Qty: 28 UNITS | Refills: 0 | Status: ON HOLD | OUTPATIENT
Start: 2018-07-10 | End: 2018-11-20 | Stop reason: SDUPTHER

## 2018-07-10 RX ORDER — FLUTICASONE PROPIONATE AND SALMETEROL 100; 50 UG/1; UG/1
1 POWDER RESPIRATORY (INHALATION) EVERY 12 HOURS
Qty: 1 INHALER | Refills: 0 | Status: SHIPPED
Start: 2018-07-10 | End: 2019-01-18

## 2018-07-10 RX ORDER — INSULIN ASPART 100 [IU]/ML
INJECTION, SOLUTION INTRAVENOUS; SUBCUTANEOUS
Qty: 1 PEN | Refills: 0 | Status: SHIPPED | OUTPATIENT
Start: 2018-07-10

## 2018-07-10 RX ORDER — PREDNISONE 20 MG/1
TABLET ORAL
Qty: 65 TAB | Refills: 0 | Status: SHIPPED | OUTPATIENT
Start: 2018-07-10 | End: 2018-07-30 | Stop reason: ALTCHOICE

## 2018-07-10 RX ADMIN — LISINOPRIL 20 MG: 20 TABLET ORAL at 09:13

## 2018-07-10 RX ADMIN — IPRATROPIUM BROMIDE 0.5 MG: 0.5 SOLUTION RESPIRATORY (INHALATION) at 09:19

## 2018-07-10 RX ADMIN — BUDESONIDE 500 MCG: 0.5 INHALANT RESPIRATORY (INHALATION) at 09:19

## 2018-07-10 RX ADMIN — IPRATROPIUM BROMIDE 0.5 MG: 0.5 SOLUTION RESPIRATORY (INHALATION) at 13:56

## 2018-07-10 RX ADMIN — ARFORMOTEROL TARTRATE 15 MCG: 15 SOLUTION RESPIRATORY (INHALATION) at 09:19

## 2018-07-10 RX ADMIN — INSULIN LISPRO 6 UNITS: 100 INJECTION, SOLUTION INTRAVENOUS; SUBCUTANEOUS at 12:34

## 2018-07-10 RX ADMIN — ASPIRIN 81 MG: 81 TABLET, COATED ORAL at 09:13

## 2018-07-10 RX ADMIN — FLUTICASONE PROPIONATE 2 SPRAY: 50 SPRAY, METERED NASAL at 09:14

## 2018-07-10 RX ADMIN — PANTOPRAZOLE SODIUM 40 MG: 40 TABLET, DELAYED RELEASE ORAL at 09:13

## 2018-07-10 RX ADMIN — PREDNISONE 60 MG: 20 TABLET ORAL at 09:13

## 2018-07-10 RX ADMIN — ENOXAPARIN SODIUM 40 MG: 40 INJECTION, SOLUTION INTRAVENOUS; SUBCUTANEOUS at 03:27

## 2018-07-10 NOTE — DISCHARGE SUMMARY
Discharge Summary  [facility]      Patient: Brittany Galindo Age: 61 y.o. Sex: female  : 1960    MRN: [id number]      DOA: 2018      Discharge Date: 7/10/18      Attending:No att. 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)  - lantus increased to 30 units    - pulmonology recommends trying to get authorization to restart Spiriva Respimat instead of Incurse, may consider switching patient to out patient nebulizer therapy through Medicare Part D  - slow steroid taper: (60 mg x 10 days, 40 mg x 10 days, 20 mg x 10 days, 10mg x 10 days and 5 mg x 10 days then stop)  Pending labs and studies:  none  Operative Procedures:   None     Discharge Medications:     Discharge Medication List as of 7/10/2018  3:27 PM      START taking these medications    Details   NOVOLOG FLEXPEN U-100 INSULIN 100 unit/mL inpn Continue home Sliding scale insulin as prior to admission, Print, Disp-1 Pen, R-0, ERLINDA      fluticasone-salmeterol (ADVAIR DISKUS) 100-50 mcg/dose diskus inhaler Take 1 Puff by inhalation every twelve (12) hours. , No Print, Disp-1 Inhaler, R-0      predniSONE (DELTASONE) 20 mg tablet Take 3 tablets for 9 days. Then take 2 tables for 10 days. Then take 1 tablet for 10 days. Then take 1/2 tablet for 10 days. Then take 1/4 tablet for 10 days. Then stop. Indications: COPD, Print, Disp-65 Tab, R-0         CONTINUE these medications which have CHANGED    Details   insulin glargine (LANTUS,BASAGLAR) 100 unit/mL (3 mL) inpn 30 Units by SubCUTAneous route nightly.  Indications: type 2 diabetes mellitus, Print, Disp-28 Units, R-0         CONTINUE these medications which have NOT CHANGED    Details   umeclidinium (INCRUSE ELLIPTA) 62.5 mcg/actuation inhaler Take 1 Puff by inhalation daily. Indications: BRONCHOSPASM PREVENTION WITH COPD, Normal, Disp-1 Inhaler, R-0      !! Diabetic Supplies, [address] [facility] Diabetic needles 1/2 inch 31 gauge - 1 injection daily, Normal, Disp-30 Each, R-5      !! Diabetic Supplies, [address] [facility] Diabetic test strips - use four times daily, Print, Disp-120 Each, R-0      !! Diabetic Supplies, [address] [facility] Diabetic lancets - use three times daily, Normal, Disp-90 Each, R-5      ipratropium (ATROVENT) 0.02 % soln 2.5 mL by Nebulization route four (4) times daily as needed. Indications: BRONCHOSPASM PREVENTION WITH COPD, Print, Disp-30 Vial, R-0      albuterol (PROVENTIL VENTOLIN) 2.5 mg /3 mL (0.083 %) nebulizer solution 3 mL by Nebulization route every four (4) hours as needed for Wheezing. Indications: BRONCHOSPASM PREVENTION, Chronic Obstructive Pulmonary Disease, Print, Disp-30 Each, R-0      omeprazole (PRILOSEC) 40 mg capsule Take 40 mg by mouth daily. Indications: gastroesophageal reflux disease, Historical Med      lisinopril (PRINIVIL, ZESTRIL) 40 mg tablet Take 20 mg by mouth two (2) times a day. Indications: hypertension, Historical Med      simethicone (MYLICON) 80 mg chewable tablet Take 80 mg by mouth every six (6) hours as needed for Flatulence., Historical Med      cpap machine kit by Does Not Apply route nightly., Historical Med      OXYGEN-AIR DELIVERY SYSTEMS 2 L by Nasal route continuous. First Choice, Historical Med      fluticasone-salmeterol (ADVAIR HFA) 230-21 mcg/actuation inhaler Take 2 Puffs by inhalation two (2) times a day., Historical Med      !! Diabetic Supplies, [address]  [facility] Diabetic syringes 1 mL - use one daily, Print, Disp-30 Each, R-5      albuterol (PROVENTIL HFA, VENTOLIN HFA, PROAIR HFA) 90 mcg/actuation inhaler Take 2 Puffs by inhalation every four (4) hours as needed for Wheezing. For the next 2 days after hospital discharge, use your inhaler 4 times a day., Print, Disp-1 Inhaler, R-0      Inhalational Spacing Device (AEROCHAMBER) 1 Each by Does Not Apply route as needed for Cough or Other (COPD exacerbation). , Normal, Disp-1 Device, R-0      fluticasone (FLONASE) 50 mcg/actuation nasal spray 2 Sprays by Both Nostrils route daily. , Historical Med      montelukast (SINGULAIR) 10 mg tablet Take 10 mg by mouth nightly., Historical Med      aspirin delayed-release 81 mg tablet Take 81 mg by mouth daily. , Historical Med      famotidine (PEPCID) 20 mg tablet Take 20 mg by mouth two (2) times daily as needed., Historical Med       !! - Potential duplicate medications found. Please discuss with provider. STOP taking these medications       insulin NPH/insulin regular (NOVOLIN 70/30 U-100 INSULIN) 100 unit/mL (70-30) injection Comments:   Reason for Stopping:             Disposition: Home with Home Health    Consultants:    [address] [place] [address] Course (including pertinent history and physical findings)  61 y.o. female with PMH COPD GOLD class B, SHERRIE, Asthma, HTN, IDDM, GERD, now presenting with complaint of SOB    COPD  Patient has history of severe COPD , GOLD Class D, FEV1 of 51%. Patient reported to the ED on 7/7 for SOB and chest tightness. On physical exam she had labored breathing, distant lung sounds, and faint expiratory wheezes throughout. She had been treated as an outpatient in the 2 weeks prior with levaquin and prednisone and this was her third admission for COPD exacerbation this year. She is on home O2 at 2 liters per minute. At home she takes incure ellipta, advair, albuterol, and singular. She received zosyn, azithromycin, and Mg in the ED. On admission she was started on duonebs, solumedrol, her advair was substituted for brovana and Pulmicort, and she continued on her home singular.  Pulmonology was consulted and started BIPAP at night and discontinued antibiotics. Patients exacerbation improved and she was transitioned to PO steroids and continued on duonebs, brovana, pulmicort, and singular. On day of discharge she was maintaining adequate oxygen saturation on 2 L nasal canula and improved lung exam. Pulmonology recommended a slow steroid taper and outpatient discussion for BIPAP candidacy. Patient had esophogram to search for cause of recurrent exacerbations but the study was negative for significant reflux. 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 Plan will be for ENT follow up as an out patient. DM  Patient found to have HbA1c of 7.9 with blood sugars in the 300 range. It was likely this elevation was in the setting of steroid administration. She reports metformin intolerance in the past and has since been treated with Lantus and SSI. During this admission she was treated with high sensitivity SSI and her sugar improved to 126 on day of discharge. Her lantus was increased from 25 to 30 and she was continued on her SSI flexpen. HTN, Diastolic CHF  Patient was continued on home aspirin and lisinopril. SHERRIE  Followed by Dr. Orquidea Davila, pulmonology; On CPAP nightly at home. Patient started on BIPAP at night during admission. Summarized key findings and results (labs, imaging studies, ECHO, cardiac cath, endoscopies, etc):  Esophogram (7/918): No aspiration or penetration identified during the study. Full report will be provided by speech pathologist.  CT Sinus (7/9/18): Frontal sinuses: An osteoma is noted in the left frontal sinus near the ethmoid recess. It partially obstructs the frontal ethmoidal recess but is incomplete. The frontal sinuses are otherwise well aerated.   CBC w/Diff    Lab Results   Component Value Date/Time    WBC 11.4 07/10/2018 02:30 AM    HGB 12.2 07/10/2018 02:30 AM    HCT 36.9 07/10/2018 02:30 AM    PLATELET 938 03/49/1260 02:30 AM    MCV 89.8 07/10/2018 02:30 AM           Chemistry    Lab Results   Component Value Date/Time    Sodium 137 07/10/2018 02:30 AM    Potassium 3.9 07/10/2018 02:30 AM    Chloride 102 07/10/2018 02:30 AM    CO2 31 07/10/2018 02:30 AM    Anion gap 4 07/10/2018 02:30 AM    Glucose 247 (H) 07/10/2018 02:30 AM    BUN 20 (H) 07/10/2018 02:30 AM    Creatinine 1.05 07/10/2018 02:30 AM    BUN/Creatinine ratio 19 07/10/2018 02:30 AM    GFR est AA >60 07/10/2018 02:30 AM    GFR est non-AA 54 (L) 07/10/2018 02:30 AM    Calcium 9.3 07/10/2018 02:30 AM           Functional status and cognitive function:    Ambulatory  Status: alert, cooperative, no distress, appears stated age    Diet: Diabetic    Code status and advanced care plan: Full  Point of Contact: Seema Dumont (daughter) [phone]    Patient Education:  Patient was educated on the following topics prior to discharge: COPD    Follow-up:   Follow-up Information     Follow up With Details Faith Olson MD On 7/12/2018 @ 4:00 [facility] [address], DO   will make appointment on Thursday, July 12, as office was closed by the time of calling for appointment.   [address] [place] [address]  [phone]              ================================================================  Annika Angeles MD PGY-1  [facility]  7/10/2018, 5:18 PM

## 2018-07-10 NOTE — PROGRESS NOTES
Intern Progress Note  [facility]       Patient: Noa Choudhary MRN: [id number]  CSN: [id number]    YOB: 1960  Age: 61 y.o. Sex: female    DOA: 7/6/2018 LOS:  LOS: 3 days   PCP: Ashlee Jimenez MD                 Subjective:     Acute events: No acute events overnight. Says she took her O2 off to use bathroom and become slightly SOB but was ok after getting her oxygen back on. Says she used her BIPAP.      Brief ROS: negative for CP, SOB, fevers, HA    Objective:      Patient Vitals for the past 24 hrs:   Temp Pulse Resp BP SpO2   07/10/18 0922 - - - - 99 %   07/10/18 0836 97 °F (36.1 °C) (!) 59 18 134/83 98 %   07/10/18 0403 97 °F (36.1 °C) (!) 59 18 141/85 96 %   07/10/18 0021 97.7 °F (36.5 °C) 65 18 110/63 99 %   07/09/18 2047 96.7 °F (35.9 °C) 68 18 123/68 99 %   07/09/18 1627 97.9 °F (36.6 °C) 67 18 147/76 97 %   07/09/18 1153 97.9 °F (36.6 °C) 70 18 161/78 97 %       Physical Exam:   General: patient alert, awake, in NAD  Cardiovascular: RRR w/o MRGs  Respiratory: CTAB no rales, rhonchi, wheezes  Abdomen: Soft, +BS, non-tendeder, Non-Distended  Extremities: no peripheral edema, 2+ posterior tibial pulses intact bilaterally  Neuro: Cranial nerves grossly intact, grossly moving upper and lower extremities  Skin: Negative for lesions, ulcers, rashes    Lab/Data Reviewed:  BMP:   Lab Results   Component Value Date/Time     07/10/2018 02:30 AM    K 3.9 07/10/2018 02:30 AM     07/10/2018 02:30 AM    CO2 31 07/10/2018 02:30 AM    AGAP 4 07/10/2018 02:30 AM     (H) 07/10/2018 02:30 AM    BUN 20 (H) 07/10/2018 02:30 AM    CREA 1.05 07/10/2018 02:30 AM    GFRAA >60 07/10/2018 02:30 AM    GFRNA 54 (L) 07/10/2018 02:30 AM     CMP:   Lab Results   Component Value Date/Time     07/10/2018 02:30 AM    K 3.9 07/10/2018 02:30 AM     07/10/2018 02:30 AM    CO2 31 07/10/2018 02:30 AM    AGAP 4 07/10/2018 02:30 AM     (H) 07/10/2018 02:30 AM    BUN 20 (H) 07/10/2018 02:30 AM    CREA 1.05 07/10/2018 02:30 AM    GFRAA >60 07/10/2018 02:30 AM    GFRNA 54 (L) 07/10/2018 02:30 AM    CA 9.3 07/10/2018 02:30 AM     CBC:   Lab Results   Component Value Date/Time    WBC 11.4 07/10/2018 02:30 AM    HGB 12.2 07/10/2018 02:30 AM    HCT 36.9 07/10/2018 02:30 AM     07/10/2018 02:30 AM        CBC w/Diff Recent Labs      07/10/18   0230  07/09/18   0235  07/08/18   0220   WBC  11.4  12.9  10.2   RBC  4.11*  3.88*  4.00*   HGB  12.2  11.4*  11.9*   HCT  36.9  34.5*  35.4   PLT  290  273  249   GRANS  69  91*  92*   LYMPH  24  6*  6*   EOS  0  0  0      Chemistry Recent Labs      07/10/18   0835  07/10/18   0230   07/09/18   0235   07/08/18   0220   GLUCPOC  126*   --    < >   --    < >   --    GLU   --   247*   --   313*   --   303*   NA   --   137   --   137   --   139   K   --   3.9   --   4.6   --   4.5   CL   --   102   --   103   --   104   CO2   --   31   --   26   --   25   BUN   --   20*   --   20*   --   17   CREA   --   1.05   --   1.16   --   1.13   CA   --   9.3   --   9.1   --   9.0   AGAP   --   4   --   8   --   10   BUCR   --   19   --   17   --   15    < > = values in this interval not displayed. Microbiology No results for input(s): SDES, CULT in the last 72 hours. No results for input(s): CULT in the last 72 hours.    I/O No intake or output data in the 24 hours ending 07/10/18 0928       Scheduled Medications Reviewed:  Current Facility-Administered Medications   Medication Dose Route Frequency    predniSONE (DELTASONE) tablet 60 mg  60 mg Oral DAILY WITH BREAKFAST    ipratropium (ATROVENT) 0.02 % nebulizer solution 0.5 mg  0.5 mg Nebulization Q6H RT    insulin glargine (LANTUS) injection 33 Units  33 Units SubCUTAneous QHS    enoxaparin (LOVENOX) injection 40 mg  40 mg SubCUTAneous Q24H    montelukast (SINGULAIR) tablet 10 mg  10 mg Oral QHS    fluticasone (FLONASE) 50 mcg/actuation nasal spray 2 Spray  2 Spray Both Nostrils DAILY    aspirin delayed-release tablet 81 mg  81 mg Oral DAILY    lisinopril (PRINIVIL, ZESTRIL) tablet 20 mg  20 mg Oral BID    pantoprazole (PROTONIX) tablet 40 mg  40 mg Oral ACB    insulin lispro (HUMALOG) injection   SubCUTAneous AC&HS    arformoterol (BROVANA) neb solution 15 mcg  15 mcg Nebulization BID RT    budesonide (PULMICORT) 500 mcg/2 ml nebulizer suspension  500 mcg Nebulization BID RT         Imaging, EKG/Telemetry:    Imaging:                   Assessment/Plan     Edna Mendes is a 61 y.o. female with a PMHx of COPD, Asthma, HTN, Type II DM, and GERD, who is now admitted with an acute exacerbation of COPD. Patient appears improved and is at her baseline respiratory status.      Acute Exacerbation of COPD w/Chronic Hypoxic Respiratory Failure- Improving   Severe COPD, GOLD Class D (High sxs burden, >2 exacerbation in last year, [email or web address]), on home O2 at 2 LPM with any activity;   - appreciate Pulmonology recs, patient received esophogram yesterday with no reflux noted, went for CT of sinuses with findings as above  - Continue home O2 to keep SpO2 88-92%   - Substitute Brovanna and Pulmicort for home Advair 2 puffs BID  - hold home Incruse Ellipta while having acute exacerbation   - ABX held by Pulm since treated as outpatient   - Continue home Montelukast 10 mg daily   - Continue home Flonase PRN   - Duo-Nebs q4hrs per RT   - CTD Bi-pap per Pulm  - HOB>30*, Bronchial hygiene, Plum toilet, Incentive spirometry   - started on 60 Prednisone PO yesterday, PO taper per pulm: slow taper (60 mg x 10 days, 40 mg x 10 days, 20 mg x 10 days, 10mg x 10 days and 5 mg x 10 days then stop)  -PT, OT  -Outpatient Pulmonary follow up with continued discussion of home bi-pap candidacy      Type II DM- Last HbA1c 7.7 in 3/2018; on admission: 7.9, BG 300s due to steroids. Improved to 126 today.    Pt previously on Metformin but unable to tolerate due to diarrhea and possible lactic acidosis that developed   - Increase Lantus to 33 units nightly   - SSI (high sensitivity) w/Accuchecks ACHS           HTN, Hx diastolic CHF- BP Not well controlled as outpatient;  Last TTE 9/2016 showed EF 60% with G1DD; pt has had appointment with cardiologist Dr. Jorge Shields   7/7 TTE: EF 66-70%, concentric hypertrophy,  - Continue home ASA 81 mg daily, Lisinopril 20 mg qam      SHERRIE- Followed by Dr. Lyssa Norris, pulmonology; On CPAP nightly at home, cannot tolerate when having an exacerbation   -Hold Home CPAP   -Bipap QHS per PULM and RT      GERD  - Continue home Protonix 40 mg daily       Diet: Diabetic  DVT Prophylaxis: Lovenox 40mg  Code Status: Full  Point of Contact: Michele Arevalo (daughter) [phone]    Jefe Quiroz MD PGY-1  7/10/2018, 9:28 AM

## 2018-07-10 NOTE — PROGRESS NOTES
Discharge instructions provided. Patient stated that she had no questions or concerns. Hard scripts provided. Patient waiting for daughter to arrive.

## 2018-07-10 NOTE — PROGRESS NOTES
Problem: Self Care Deficits Care Plan (Adult)  Goal: *Acute Goals and Plan of Care (Insert Text)  Outcome: Resolved/Met Date Met: 07/10/18  Occupational Therapy EVALUATION/discharge    Patient: Tammy Briggs (62 y.o. female)  Date: 7/10/2018  Primary Diagnosis: COPD exacerbation ([facility] [address]        Precautions:   Fall    ASSESSMENT AND RECOMMENDATIONS:  Based on the objective data described below, the patient is able to perform basic self care tasks without assistance while seated using AE (reacher, sock aid) from home prn. She fatigues easily and needs frequent rest breaks. Supervision given for functional standing/transfers. Will defer to PT for mobility training. Patient has inconsistent performance with ADLs per her own report and is alone during the day with daughter home at night. She will benefit from an aide to assist her at home for safety in times of increased fatigue. She has all needed DME for bathroom safety. Skilled occupational therapy is not indicated at this time. Discharge Recommendations: None  Further Equipment Recommendations for Discharge: N/A      Barriers to Learning/Limitations: None  Compensate with: visual, verbal, tactile, kinesthetic cues/model     COMPLEXITY     Eval Complexity: History: LOW Complexity : Brief history review ; Examination: LOW Complexity : 1-3 performance deficits relating to physical, cognitive , or psychosocial skils that result in activity limitations and / or participation restrictions ; Decision Making:LOW Complexity : No comorbidities that affect functional and no verbal or physical assistance needed to complete eval tasks  Assessment: Low Complexity        G-CODES:     Self Care  Current  CI= 1-19%   Goal  CI= 1-19%   D/C  CI= 1-19%. The severity rating is based on the Level of Assistance required for Functional Mobility and ADLs. SUBJECTIVE:   Patient stated I'm getting an aide when I get home.     OBJECTIVE DATA SUMMARY: Past Medical History:   Diagnosis Date    Asthma     COPD     Cystocele, midline     Diabetes mellitus ([address]     GERD (gastroesophageal reflux disease)     Hidradenitis suppurativa     Hyperlipidemia     Hypertension     SHERRIE on CPAP     CPAP    Stress incontinence      Past Surgical History:   Procedure Laterality Date    BREAST SURGERY PROCEDURE UNLISTED      Right breast benign tumor removal    HX APPENDECTOMY      HX CHOLECYSTECTOMY      HX DILATION AND CURETTAGE      Dysfunctional uterine bleeding, thought 2/2 fibroids    HX TUBAL LIGATION       Prior Level of Function/Home Situation: Pt was modified independent with basic self care tasks and used a RW at times for functional mobility PTA. Home Situation  Home Environment: Private residence  One/Two Story Residence: Two story  # of Interior Steps: 15  Interior Rails: Right  Living Alone: No  Support Systems: Family member(s)  Patient Expects to be Discharged to[de-identified] Private residence  Current DME Used/Available at Home: Commode, bedside, CPAP, Oxygen, portable, Walker, Wheelchair  Tub or Shower Type: Tub/Shower combination (with seat)  [x]     Right hand dominant   []     Left hand dominant  Cognitive/Behavioral Status:  Neurologic State: Alert  Orientation Level: Oriented X4  Cognition: Appropriate decision making; Follows commands  Safety/Judgement: Awareness of environment; Fall prevention    Skin: Intact on UEs    Edema: None noted in UEs    Vision/Perceptual:    Acuity: Within Defined Limits      Coordination:  Fine Motor Skills-Upper: Left Intact; Right Intact    Gross Motor Skills-Upper: Left Intact; Right Intact    Balance:  Sitting: Intact  Standing: With support    Strength:  Strength: Generally decreased, functional (UEs; 4/5 throughout, poor endurance)    Tone & Sensation:  Tone: Normal (UEs)  Sensation: Intact (UEs)    Range of Motion:  AROM: Within functional limits (UEs)    Functional Mobility and Transfers for ADLs:  Bed Mobility:  Supine to Sit:  (Pt up in chair upon arrival.)  Transfers:  Sit to Stand: Supervision   Toilet Transfer : Supervision    ADL Assessment:  Feeding: Independent    Oral Facial Hygiene/Grooming: Independent    Bathing: Supervision    Upper Body Dressing: Modified independent    Lower Body Dressing: Supervision (extra time)    Toileting: Supervision    Pain:  Pt reports 0/10 pain or discomfort prior to treatment.    Pt reports 0/10 pain or discomfort post treatment. Activity Tolerance:   Good    Please refer to the flowsheet for vital signs taken during this treatment. After treatment:   [x]  Patient left in no apparent distress sitting up in chair  []  Patient left in no apparent distress in bed  [x]  Call bell left within reach  []  Nursing notified  []  Caregiver present  []  Bed alarm activated    COMMUNICATION/EDUCATION:   Communication/Collaboration:  [x]      Home safety education was provided and the patient/caregiver indicated understanding. [x]      Patient/family have participated as able and agree with findings and recommendations. []      Patient is unable to participate in plan of care at this time.     Destiny Dorsey MS OTR/L  Time Calculation: 15 mins

## 2018-07-10 NOTE — DISCHARGE INSTRUCTIONS
Chronic Obstructive Pulmonary Disease (COPD): Care Instructions  Your Care Instructions    Chronic obstructive pulmonary disease (COPD) is a general term for a group of lung diseases, including emphysema and chronic bronchitis. People with COPD have decreased airflow in and out of the lungs, which makes it hard to breathe. The airways also can get clogged with thick mucus. Cigarette smoking is a major cause of COPD. Although there is no cure for COPD, you can slow its progress. Following your treatment plan and taking care of yourself can help you feel better and live longer. Follow-up care is a key part of your treatment and safety. Be sure to make and go to all appointments, and call your doctor if you are having problems. It's also a good idea to know your test results and keep a list of the medicines you take. How can you care for yourself at home?   Staying healthy    · Do not smoke. This is the most important step you can take to prevent more damage to your lungs. If you need help quitting, talk to your doctor about stop-smoking programs and medicines. These can increase your chances of quitting for good.     · Avoid colds and flu. Get a pneumococcal vaccine shot. If you have had one before, ask your doctor whether you need a second dose. Get the flu vaccine every fall. If you must be around people with colds or the flu, wash your hands often.     · Avoid secondhand smoke, air pollution, and high altitudes. Also avoid cold, dry air and hot, humid air. Stay at home with your windows closed when air pollution is bad.    Medicines and oxygen therapy    · Take your medicines exactly as prescribed. Call your doctor if you think you are having a problem with your medicine.     · You may be taking medicines such as:  ¨ Bronchodilators. These help open your airways and make breathing easier. Bronchodilators are either short-acting (work for 6 to 9 hours) or long-acting (work for 24 hours).  You inhale most bronchodilators, so they start to act quickly. Always carry your quick-relief inhaler with you in case you need it while you are away from home. ¨ Corticosteroids (prednisone, budesonide). These reduce airway inflammation. They come in pill or inhaled form. You must take these medicines every day for them to work well.     · A spacer may help you get more inhaled medicine to your lungs. Ask your doctor or pharmacist if a spacer is right for you. If it is, ask how to use it properly.     · Do not take any vitamins, over-the-counter medicine, or herbal products without talking to your doctor first.     · If your doctor prescribed antibiotics, take them as directed. Do not stop taking them just because you feel better. You need to take the full course of antibiotics.     · Oxygen therapy boosts the amount of oxygen in your blood and helps you breathe easier. Use the flow rate your doctor has recommended, and do not change it without talking to your doctor first.   Activity    · Get regular exercise. Walking is an easy way to get exercise. Start out slowly, and walk a little more each day.     · Pay attention to your breathing. You are exercising too hard if you cannot talk while you are exercising.     · Take short rest breaks when doing household chores and other activities.     · Learn breathing methods-such as breathing through pursed lips-to help you become less short of breath.     · If your doctor has not set you up with a pulmonary rehabilitation program, talk to him or her about whether rehab is right for you. Rehab includes exercise programs, education about your disease and how to manage it, help with diet and other changes, and emotional support. Diet    · Eat regular, healthy meals. Use bronchodilators about 1 hour before you eat to make it easier to eat. Eat several small meals instead of three large ones.  Drink beverages at the end of the meal. Avoid foods that are hard to chew.     · Eat foods that contain protein so that you do not lose muscle mass.     · Talk with your doctor if you gain too much weight or if you lose weight without trying.    Mental health    · Talk to your family, friends, or a therapist about your feelings. It is normal to feel frightened, angry, hopeless, helpless, and even guilty. Talking openly about bad feelings can help you cope. If these feelings last, talk to your doctor. When should you call for help? Call 911 anytime you think you may need emergency care. For example, call if:    · You have severe trouble breathing.    Call your doctor now or seek immediate medical care if:    · You have new or worse trouble breathing.     · You cough up blood.     · You have a fever.    Watch closely for changes in your health, and be sure to contact your doctor if:    · You cough more deeply or more often, especially if you notice more mucus or a change in the color of your mucus.     · You have new or worse swelling in your legs or belly.     · You are not getting better as expected. Where can you learn more? Go to http://etelvina-olivier.info/. Salbador Centeno in the search box to learn more about \"Chronic Obstructive Pulmonary Disease (COPD): Care Instructions. \"  Current as of: December 6, 2017  Content Version: 11.7  © 2941-2987 [employer]. Care instructions adapted under license by [employer] (which disclaims liability or warranty for this information). If you have questions about a medical condition or this instruction, always ask your healthcare professional. [place] [address] any warranty or liability for your use of this information. COPD Exacerbation Plan: Care Instructions  Your Care Instructions    If you have chronic obstructive pulmonary disease (COPD), your usual shortness of breath could suddenly get worse. You may start coughing more and have more mucus.  This flare-up is called a COPD exacerbation (say \"ka-LTS-[id number]-BAY-shun\"). A lung infection or air pollution could set off an exacerbation. Sometimes it can happen after a quick change in temperature or being around chemicals. Work with your doctor to make a plan for dealing with an exacerbation. You can better manage it if you plan ahead. Follow-up care is a key part of your treatment and safety. Be sure to make and go to all appointments, and call your doctor if you are having problems. It's also a good idea to know your test results and keep a list of the medicines you take. How can you care for yourself at home? During an exacerbation  · Do not panic if you start to have one. Quick treatment at home may help you prevent serious breathing problems. If you have a COPD exacerbation plan that you developed with your doctor, follow it. · Take your medicines exactly as your doctor tells you. ¨ Use your inhaler as directed by your doctor. If your symptoms do not get better after you use your medicine, have someone take you to the emergency room. Call an ambulance if necessary. ¨ With inhaled medicines, a spacer or a nebulizer may help you get more medicine to your lungs. Ask your doctor or pharmacist how to use them properly. Practice using the spacer in front of a mirror before you have an exacerbation. This may help you get the medicine into your lungs quickly. ¨ If your doctor has given you steroid pills, take them as directed. ¨ Your doctor may have given you a prescription for antibiotics, which you can fill if you need it. ¨ Talk to your doctor if you have any problems with your medicine. And call your doctor if you have to use your antibiotic or steroid pills. Preventing an exacerbation  · Do not smoke. This is the most important step you can take to prevent more damage to your lungs and prevent problems. If you already smoke, it is never too late to stop. If you need help quitting, talk to your doctor about stop-smoking programs and medicines.  These can increase your chances of quitting for good. · Take your daily medicines as prescribed. · Avoid colds and flu. ¨ Get a pneumococcal vaccine. ¨ Get a flu vaccine each year, as soon as it is available. Ask those you live or work with to do the same, so they will not get the flu and infect you. ¨ Try to stay away from people with colds or the flu. ¨ Wash your hands often. · Avoid secondhand smoke; air pollution; cold, dry air; hot, humid air; and high altitudes. Stay at home with your windows closed when air pollution is bad. · Learn breathing techniques for COPD, such as breathing through pursed lips. These techniques can help you breathe easier during an exacerbation. When should you call for help? Call 911 anytime you think you may need emergency care. For example, call if:    · You have severe trouble breathing.     · You have severe chest pain.    Call your doctor now or seek immediate medical care if:    · You have new or worse shortness of breath.     · You develop new chest pain.     · You are coughing more deeply or more often, especially if you notice more mucus or a change in the color of your mucus.     · You cough up blood.     · You have new or increased swelling in your legs or belly.     · You have a fever.    Watch closely for changes in your health, and be sure to contact your doctor if:    · You need to use your antibiotic or steroid pills.     · Your symptoms are getting worse. Where can you learn more? Go to http://etelvina-olivier.info/. Enter F845 in the search box to learn more about \"COPD Exacerbation Plan: Care Instructions. \"  Current as of: December 6, 2017  Content Version: 11.7  © 6471-4452 [employer]. Care instructions adapted under license by [employer] (which disclaims liability or warranty for this information).  If you have questions about a medical condition or this instruction, always ask your healthcare professional. [employer] disclaims any warranty or liability for your use of this information. Patient armband removed and shredded      DISCHARGE SUMMARY from Nurse    PATIENT INSTRUCTIONS:    After general anesthesia or intravenous sedation, for 24 hours or while taking prescription Narcotics:  · Limit your activities  · Do not drive and operate hazardous machinery  · Do not make important personal or business decisions  · Do  not drink alcoholic beverages  · If you have not urinated within 8 hours after discharge, please contact your surgeon on call. Report the following to your surgeon:  · Excessive pain, swelling, redness or odor of or around the surgical area  · Temperature over 100.5  · Nausea and vomiting lasting longer than 4 hours or if unable to take medications  · Any signs of decreased circulation or nerve impairment to extremity: change in color, persistent  numbness, tingling, coldness or increase pain  · Any questions    What to do at Home:  Recommended activity: Activity as tolerated    If you experience any of the following symptoms Nausea, vomiting, diarrhea, fever greater than 100.5, dizziness, severe headache, shortness of breath, chest pain, increased pain, please follow up with PCP. *  Please give a list of your current medications to your Primary Care Provider. *  Please update this list whenever your medications are discontinued, doses are      changed, or new medications (including over-the-counter products) are added. *  Please carry medication information at all times in case of emergency situations. These are general instructions for a healthy lifestyle:    No smoking/ No tobacco products/ Avoid exposure to second hand smoke  Surgeon General's Warning:  Quitting smoking now greatly reduces serious risk to your health.     Obesity, smoking, and sedentary lifestyle greatly increases your risk for illness    A healthy diet, regular physical exercise & weight monitoring are important for maintaining a healthy lifestyle    You may be retaining fluid if you have a history of heart failure or if you experience any of the following symptoms:  Weight gain of 3 pounds or more overnight or 5 pounds in a week, increased swelling in our hands or feet or shortness of breath while lying flat in bed. Please call your doctor as soon as you notice any of these symptoms; do not wait until your next office visit. Recognize signs and symptoms of STROKE:    F-face looks uneven    A-arms unable to move or move unevenly    S-speech slurred or non-existent    T-time-call 911 as soon as signs and symptoms begin-DO NOT go       Back to bed or wait to see if you get better-TIME IS BRAIN. Warning Signs of HEART ATTACK     Call 911 if you have these symptoms:   Chest discomfort. Most heart attacks involve discomfort in the center of the chest that lasts more than a few minutes, or that goes away and comes back. It can feel like uncomfortable pressure, squeezing, fullness, or pain.  Discomfort in other areas of the upper body. Symptoms can include pain or discomfort in one or both arms, the back, neck, jaw, or stomach.  Shortness of breath with or without chest discomfort.  Other signs may include breaking out in a cold sweat, nausea, or lightheadedness. Don't wait more than five minutes to call 911 - MINUTES MATTER! Fast action can save your life. Calling 911 is almost always the fastest way to get lifesaving treatment. Emergency Medical Services staff can begin treatment when they arrive -- up to an hour sooner than if someone gets to the hospital by car. The discharge information has been reviewed with the patient. The patient verbalized understanding.   Discharge medications reviewed with the patient and appropriate educational materials and side effects teaching were provided.   ___________________________________________________________________________________________________________________________________

## 2018-07-10 NOTE — HOME CARE
Rec referral for H2h for COPD - [facility] will follow for [facility] - BRENDA Boo RN

## 2018-07-10 NOTE — PROGRESS NOTES
Care Management Interventions  PCP Verified by CM: Yes  Mode of Transport at Discharge: Other (see comment) (family)  Transition of Care Consult (CM Consult): [address]: Yes  Physical Therapy Consult: Yes  Occupational Therapy Consult: Yes  Confirm Follow Up Transport: Family  Plan discussed with Pt/Family/Caregiver: Yes  Discharge Location  Discharge Placement: Home with home health     Reason for Admission:   COPD exacerbation                   RRAT Score:      28               Plan for utilizing home health:   yes                       Likelihood of Readmission:  Red/high                         Transition of Care Plan:   Pt will be discharged home with h2h for COPD  [place] referral has been sent to [facility] and Margaret Chaudhry was called.

## 2018-07-10 NOTE — PROGRESS NOTES
[employer] [facility]  Pulmonary, Critical Care, and Sleep Medicine    Name: Ariana Colon MRN: [id number]   : 1960 Hospital: [facility]   Date: 7/10/2018        Pulmonary Follow-up In-Patient Consult                                              Consult requesting physician: Dr. Juany Praks  Reason for Consult: COPD- Asthma exacerbation    IMPRESSION:   · Severe COPD with significant asthma Has had several hospitalizations over the past year for COPD-Asthma exacerbations (this is the 3rd this year) -Admitted for  acute exacerbation failed outpatient therapy with oral steroids. No evidence for infection and has completed course of appropriate antibiotics. · History of asthma  · GOLD stage 3D COPD (high symptoms and increased frequency of exacerbations) with home O2 at 2L/min. Last christiano in  showed FEV1- 1.32L (51% predicted)- (post BD) 51% with signficant BD response. · Mild SHERRIE with AHI = 12 currently prescribed CPAP 9 cm H20 with EPR = 2  · Report of palpitations/rapid heart rate- was undergoing evaluation with Holter monitor - Dr. Nikkie Orozco  · Obesity  · DM- glucose in the 300 range  · GERD  · HTN      RECOMMENDATIONS:   · Prednisone 60mg Q day- (stepped down from IV Solumderol)- Continue at this dose for now -plan for a slow taper (60 mg x 10 days, 40 mg x 10 days, 20 mg x 10 days, 10mg x 10 days and 5 mg x 10 days then stop)  · Albuterol Neb PRN  · Continue LABA - brovana and nebulized steroids: Pulmicort BID  · Continue BIPAP qhs to improve patient tolerance and help with work of breathing.   Advise BIPAP 14/9 cm H20   · Would like to keep in patient for at least another 24 hours  · Check Allergy Zone 2 panel and IgE  · Continue Singulair 10 mg Q day  · Continue Albuterol PRN  · Change scheduled DuoNeb to Atrovent neb Q6 hours  · IS at bedside  · Continue supplemental O2 to 2 L/min SpO2 .87-94%  · Nutrition: per primary team  · HOB >=30 degree, aggressive pulmonary toileting, incentive spirometry, PT/OT eval and treat  · GI Prophylaxis: per primary team: continue outpatient PPI  · DVT Prophylaxis: per primary team :on lovenox  · Glycemic control per primary team  ·   · Case management Consult- Can we see if we can get authorization for patient to restart Spiriva Respimat instead of Incruse-  May consider switching patient to OP neb therapy- would have to to get nebulized meds through Medicare Part D  ·   · Further recommendations will be based on the patient's response to recommended treatment and results of the investigation ordered. Subjective/History:     Nishi Lombardi is a 61 y.o. female with PMHx significant for COPD/asthma overlap, mild SHERRIE on CPAP therapy, obesity who has been admitted to [facility] for failed outpatient therapy for COPD exacerbation. Patient has moderate to severe COPD with frequent exacerbations and a significant asthma component as she had dramatic bronchodilator response with her last PFTs who initially presented 28JUN this year to ED with symptoms consistent with exacerbation and was sent home with 3 day course of prednisone, course of abx and follow-up plan with her pulmonologist, Dr. Roshan Escamilla. Her pulmonary appointment was cancelled because of provider sickness and she was seen 2 days ago by [facility] who gave more prednisone and instructed her to use duoneb every 6 hours. She continued to struggle with severe chest tightness and dyspnea prompting ED presentation last night. Denies fever, chills, night sweats. Not using CPAP recently due to difficulty breathing. 7/9/18  Patient reports much improvement in her dyspnea and slept well on BIPAP settings. Improved air movement on lung exam.    Ms. Yuan Cuevas is followed by Dr. Roshan Escamilla for her COPD-Asthma and I see her for her SHERRIE.     The patient feels that her exacerbations have increased since she was [facility] [id number] [facility] and that over the past year, this is the worst control she has had in a long time. She had to change her inhalers due to insurance. Pets:none at home    Smoking: denies and states she is avoiding smoking    She does not feel she is able to fulling inhaler the Incruse. She does have GERD which is severe if she does not take her PPI. She reports that she is strictly taking her inhalers and PPI and she sometimes needs to take an additional H2- blocker. She has had some maxillary sinus fullness, + clear nasal discharge. No sputum production but she feels chest congestion    In regards to home CPAP- she has been having intolerance issues- was supposed to go to her DME ([facility]) for a mask refit. She has not done that due to increase family stressors. Father  last month in [place]. She states that she feels that she tolerates BIPAP much better and that she is able to exhale much easier. 07/10/18  -Patient sitting in chair, feeling a bit better today but still not back to baseline  -Modified barium swallow negative for aspiration  - CT Sinus unremarkable for significant sinus disease  - Declined BIPAP overnight    Allergies   Allergen Reactions    Ancef [Cefazolin] Hives    Contrast Agent [Iodine] Anaphylaxis, Shortness of Breath and Swelling     Needs pre-medication for IV contrast with Benadryl, Solu-Medrol    Fish Containing Products Anaphylaxis     Pt states she had a severe allergic reaction at 7 y/o.  Metformin Other (comments)     Abdominal pain, diarrhea.     Codeine Other (comments)     Altered mental status        Past Medical History:   Diagnosis Date    Asthma     COPD     Cystocele, midline     Diabetes mellitus ([address]     GERD (gastroesophageal reflux disease)     Hidradenitis suppurativa     Hyperlipidemia     Hypertension     SHERRIE on CPAP     CPAP    Stress incontinence         Past Surgical History:   Procedure Laterality Date    BREAST SURGERY PROCEDURE UNLISTED      Right breast benign tumor removal    HX APPENDECTOMY      HX CHOLECYSTECTOMY      HX DILATION AND CURETTAGE      Dysfunctional uterine bleeding, thought 2/2 fibroids    HX TUBAL LIGATION          Family History   Problem Relation Age of Onset    Hypertension Mother     Stroke Mother     Breast Cancer Mother      Bilateral mastectomies    Cancer Mother      ovarian and breast    Heart Failure Mother     Heart Attack Father      2011    Heart Surgery Father      CABG    Heart Failure Father     COPD Sister      Heavy smoker    Cancer Sister      ovarian    Heart Failure Sister     Lung Disease Sister     Asthma Child     Cancer Maternal Aunt      pancreatic    Cancer Maternal Grandfather      stomach        Social History   Substance Use Topics    Smoking status: Former Smoker     Packs/day: 1.00     Years: 30.00     Types: Cigarettes     Start date: 5/6/1966     Quit date: 9/6/2006    Smokeless tobacco: Never Used      Comment: 1-1.5 packs per day    Alcohol use No        Prior to Admission medications    Medication Sig Start Date End Date Taking? Authorizing Provider   insulin NPH/insulin regular (NOVOLIN 70/30 U-100 INSULIN) 100 unit/mL (70-30) injection by SubCUTAneous route. 8 units if blood sugar is 200-300 over 300 12 units   Yes Historical Provider   insulin glargine (LANTUS,BASAGLAR) 100 unit/mL (3 mL) inpn 28 Units by SubCUTAneous route nightly. Indications: type 2 diabetes mellitus  Patient taking differently: 25 Units by SubCUTAneous route nightly. Indications: type 2 diabetes mellitus 3/15/18  Yes Bettie Medina MD   umeclidinium (INCRUSE ELLIPTA) 62.5 mcg/actuation inhaler Take 1 Puff by inhalation daily. Indications: BRONCHOSPASM PREVENTION WITH COPD 3/15/18  Yes Bettie Medina MD   Diabetic Supplies, [place] misc Diabetic needles 1/2 inch 31 gauge - 1 injection daily 3/15/18  Yes Bettie Medina MD   Diabetic Supplies, [place]  misc Diabetic test strips - use four times daily 3/15/18  Yes Bettie Medina MD Diabetic Supplies, [facility] [place]anc[place] - use three times daily 3/15/18  Yes Jordan Ortiz MD   ipratropium (ATROVENT) 0.02 % soln 2.5 mL by Nebulization route four (4) times daily as needed. Indications: BRONCHOSPASM PREVENTION WITH COPD 3/15/18  Yes Jordan Ortiz MD   albuterol (PROVENTIL VENTOLIN) 2.5 mg /3 mL (0.083 %) nebulizer solution 3 mL by Nebulization route every four (4) hours as needed for Wheezing. Indications: BRONCHOSPASM PREVENTION, Chronic Obstructive Pulmonary Disease 3/15/18  Yes Jordan Ortiz MD   omeprazole (PRILOSEC) 40 mg capsule Take 40 mg by mouth daily. Indications: gastroesophageal reflux disease   Yes Historical Provider   lisinopril (PRINIVIL, ZESTRIL) 40 mg tablet Take 20 mg by mouth two (2) times a day. Indications: hypertension   Yes Historical Provider   simethicone (MYLICON) 80 mg chewable tablet Take 80 mg by mouth every six (6) hours as needed for Flatulence. Yes Historical Provider   cpap machine kit by Does Not Apply route nightly. Yes Historical Provider   OXYGEN-AIR DELIVERY SYSTEMS 2 L by Nasal route continuous. [employer]   Yes Historical Provider   fluticasone-salmeterol (ADVAIR HFA) 287-80 mcg/actuation inhaler Take 2 Puffs by inhalation two (2) times a day. Yes Historical Provider   Diabetic [employer]. misc Diabetic syringes 1 mL - use one daily 2/14/17  Yes Johanne Barnhart, DO   albuterol (PROVENTIL HFA, VENTOLIN HFA, PROAIR HFA) 90 mcg/actuation inhaler Take 2 Puffs by inhalation every four (4) hours as needed for Wheezing. For the next 2 days after hospital discharge, use your inhaler 4 times a day. 3/9/16  Yes Rhina Suggs MD   Inhalational Spacing Device (AEROCHAMBER) 1 Each by Does Not Apply route as needed for Cough or Other (COPD exacerbation). 10/2/12  Yes Danitza South, DO   fluticasone (FLONASE) 50 mcg/actuation nasal spray 2 Sprays by Both Nostrils route daily.      Yes Historical Provider   montelukast (SINGULAIR) 10 mg tablet Take 10 mg by mouth nightly. Yes Phys Other, MD   aspirin delayed-release 81 mg tablet Take 81 mg by mouth daily. Historical Provider   famotidine (PEPCID) 20 mg tablet Take 20 mg by mouth two (2) times daily as needed.     Historical Provider       Current Facility-Administered Medications   Medication Dose Route Frequency    predniSONE (DELTASONE) tablet 60 mg  60 mg Oral DAILY WITH BREAKFAST    ipratropium (ATROVENT) 0.02 % nebulizer solution 0.5 mg  0.5 mg Nebulization Q6H RT    insulin glargine (LANTUS) injection 33 Units  33 Units SubCUTAneous QHS    acetaminophen (TYLENOL) tablet 650 mg  650 mg Oral Q4H PRN    enoxaparin (LOVENOX) injection 40 mg  40 mg SubCUTAneous Q24H    montelukast (SINGULAIR) tablet 10 mg  10 mg Oral QHS    fluticasone (FLONASE) 50 mcg/actuation nasal spray 2 Spray  2 Spray Both Nostrils DAILY    aspirin delayed-release tablet 81 mg  81 mg Oral DAILY    lisinopril (PRINIVIL, ZESTRIL) tablet 20 mg  20 mg Oral BID    simethicone (MYLICON) tablet 80 mg  80 mg Oral Q6H PRN    pantoprazole (PROTONIX) tablet 40 mg  40 mg Oral ACB    insulin lispro (HUMALOG) injection   SubCUTAneous AC&HS    glucose chewable tablet 16 g  4 Tab Oral PRN    glucagon (GLUCAGEN) injection 1 mg  1 mg IntraMUSCular PRN    dextrose (D50W) injection syrg 12.5-25 g  25-50 mL IntraVENous PRN    albuterol (PROVENTIL VENTOLIN) nebulizer solution 2.5 mg  2.5 mg Nebulization Q4H PRN    arformoterol (BROVANA) neb solution 15 mcg  15 mcg Nebulization BID RT    budesonide (PULMICORT) 500 mcg/2 ml nebulizer suspension  500 mcg Nebulization BID RT         Objective:   Vital Signs:    Visit Vitals    /83 (BP 1 Location: Left arm, BP Patient Position: Sitting)    Pulse (!) 59    Temp 97 °F (36.1 °C)    Resp 18    Ht 5' 3\" (1.6 m)    Wt 115.2 kg (254 lb)    SpO2 99%    BMI 44.99 kg/m2       O2 Device: Nasal cannula   O2 Flow Rate (L/min): 2 l/min   Temp (24hrs), Av.4 °F (36.3 °C), Min:96.7 °F (35.9 °C), Max:97.9 °F (36.6 °C)       Intake/Output:   Last shift:         Last 3 shifts:    No intake or output data in the 24 hours ending 07/10/18 1109    Physical Exam:     General:  Alert, Awake, NAD, pleasant. Mild breathlessness with speaking- persists but improved from 7/9/18. Head:   Normocephalic, without obvious abnormality, atraumatic. Eye:   Conjunctivae/corneas clear. PERRL, no scleral icterus, no pallor, no cyanosis  Nose:   Nares normal. Septum midline. Mucosa normal without erythema/exudate. No drainage/discharge. No sinus tenderness. Throat:  Lips, mucosa, and tongue normal. OP: MP 3-4  Neck:   Supple, symmetric, thyroid: no enlargement/tenderness/nodule, no JVD, no carotid bruit, no lymphadenopathy. Trachea midline  Back & spine: Symmetric, no curvature. Chest wall: No tenderness or deformity. No rash  Lung:   Improved air movement. No rhonchi, no wheezing- still with end-expiratory wheezing left greater than right anterior and posterior. No accessory muscle use,   Heart:   Regular rate & rhythm. S1 S2 present, no murmur, no gallop, no click, no rub  Abdomen:  Soft, NT, ND, +BS, no masses, no organomegaly  Extremities:  No pedal edema, no cyanosis, no clubbing  Pulses: 2+ and symmetric in DP  Lymphatic:  No cervical, supraclavicular and axillary palpable lymphadenopathy. Musculoskeletal: No joint swelling or tenderness. Neurologic:  Grossly non focal.        Data:         Chemistry Recent Labs      07/10/18   0230  07/09/18   0235  07/08/18   0220   GLU  247*  313*  303*   NA  137  137  139   K  3.9  4.6  4.5   CL  102  103  104   CO2  31  26  25   BUN  20*  20*  17   CREA  1.05  1.16  1.13   CA  9.3  9.1  9.0   AGAP  4  8  10   BUCR  19  17  15        Lactic Acid Lactic acid   Date Value Ref Range Status   02/10/2017 1.9 0.4 - 2.0 MMOL/L Final     No results for input(s): LAC in the last 72 hours.      Liver Enzymes Protein, total   Date Value Ref Range Status   06/02/2018 7.6 6.4 - 8.2 g/dL Final     Albumin   Date Value Ref Range Status   06/02/2018 3.7 3.4 - 5.0 g/dL Final     Globulin   Date Value Ref Range Status   06/02/2018 3.9 2.0 - 4.0 g/dL Final     A-G Ratio   Date Value Ref Range Status   06/02/2018 0.9 0.8 - 1.7   Final     AST (SGOT)   Date Value Ref Range Status   06/02/2018 23 15 - 37 U/L Final     Alk. phosphatase   Date Value Ref Range Status   06/02/2018 86 45 - 117 U/L Final     No results for input(s): TP, ALB, GLOB, AGRAT, SGOT, GPT, AP, TBIL in the last 72 hours. No lab exists for component: DBIL     CBC w/Diff Recent Labs      07/10/18   0230  07/09/18   0235  07/08/18   0220   WBC  11.4  12.9  10.2   RBC  4.11*  3.88*  4.00*   HGB  12.2  11.4*  11.9*   HCT  36.9  34.5*  35.4   PLT  290  273  249   GRANS  69  91*  92*   LYMPH  24  6*  6*   EOS  0  0  0        Cardiac Enzymes No results found for: CPK, CK, CKMMB, CKMB, RCK3, CKMBT, CKNDX, CKND1, KESHIA, TROPT, TROIQ, JOEL, TROPT, TNIPOC, BNP, BNPP     BNP No results found for: BNP, BNPP, XBNPT     Coagulation No results for input(s): PTP, INR, APTT in the last 72 hours. No lab exists for component: INREXT, INREXT      Thyroid  Lab Results   Component Value Date/Time    TSH 2.76 09/18/2016 02:20 PM          Lipid Panel Lab Results   Component Value Date/Time    Cholesterol, total 220 (H) 11/20/2012 03:22 AM    HDL Cholesterol 62 (H) 11/20/2012 03:22 AM    LDL, calculated 144.6 (H) 11/20/2012 03:22 AM    VLDL, calculated 13.4 11/20/2012 03:22 AM    Triglyceride 67 11/20/2012 03:22 AM    CHOL/HDL Ratio 3.5 11/20/2012 03:22 AM        ABG No results for input(s): PHI, PHI, POC2, PCO2I, PO2, PO2I, HCO3, HCO3I, FIO2, FIO2I in the last 72 hours.      Urinalysis Lab Results   Component Value Date/Time    Color YELLOW 02/08/2017 03:30 PM    Appearance CLEAR 02/08/2017 03:30 PM    Specific gravity 1.026 02/08/2017 03:30 PM    pH (UA) 5.0 02/08/2017 03:30 PM    Protein NEGATIVE  02/08/2017 03:30 PM    Glucose >1000 (A) 02/08/2017 03:30 PM    Ketone TRACE (A) 02/08/2017 03:30 PM    Bilirubin NEGATIVE  02/08/2017 03:30 PM    Urobilinogen 0.2 02/08/2017 03:30 PM    Nitrites NEGATIVE  02/08/2017 03:30 PM    Leukocyte Esterase NEGATIVE  02/08/2017 03:30 PM    Epithelial cells FEW 11/19/2012 02:01 PM    Bacteria FEW (A) 02/29/2012 04:26 AM    WBC 0 to 1 11/19/2012 02:01 PM    RBC NONE 11/19/2012 02:01 PM        Micro  No results for input(s): SDES, CULT in the last 72 hours. No results for input(s): CULT in the last 72 hours. XR (Most Recent). CXR reviewed by me and compared with previous CXR   Results from Hospital Encounter encounter on 07/06/18   XR ABD (KUB)   Narrative EXAM:  XR ABD (KUB)    INDICATION:  79-year-old female with reflux. COMPARISON: None. FINDINGS: Patient was originally scheduled for an upper GI evaluation for reflux  however original  view demonstrates significant amount of barium present  within the colon. The exam was therefore not performed. There are no dilated loops of bowel. Colonic diverticula are noted. Impression IMPRESSION: Retained contrast within the colon. Therefore upper GI could not be  performed. CT (Most Recent)   Results from Hospital Encounter encounter on 07/06/18   CT SINUSES WO CONT   Narrative EXAM: CT of the Paranasal Sinuses    INDICATION: Chronic rhinosinusitis    TECHNIQUE: CT of the paranasal sinuses obtained. Sagittal and coronal  reformations obtained. All CT scans at this facility are performed using dose optimization technique as  appropriate to a performed exam, to include automated exposure control,  adjustment of the mA and/or kV according to patient size (including appropriate  matching for site specific examination) or use of iterative reconstruction  technique. IV contrast: None. COMPARISON: None. FINDINGS:   Frontal sinuses: An osteoma is noted in the left frontal sinus near the ethmoid  recess.  It partially obstructs the frontal ethmoidal recess but is incomplete. The frontal sinuses are otherwise well aerated. Ethmoid air cells: The air cells are well aerated. Maxillary sinuses: They are well aerated. Sphenoid sinus: The sphenoid sinus is well aerated. Orbits: The orbital globes are unremarkable. The extraocular muscles and  retrobulbar fat are unremarkable. The osseous walls and rims are normal.     Nasal bones: Unremarkable. Nasal septum: The nasal septum is deviated to the left. A gretchen bullosa is  noted on the right. Impression IMPRESSION:  1.   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 It is incompletely  occlusive. EKG No results found for this or any previous visit. ECHO 7/7/18:  · Definity contrast was given to enhance imaging. · Left ventricular hyperdynamic systolic function. Estimated left ventricular ejection fraction is 66 - 70%. Left ventricular mild concentric hypertrophy observed. Normal left ventricular wall motion, no regional wall motion abnormality noted. Age-appropriate left ventricular diastolic function. · Right ventricle not well visualized. · Aortic valve is probably trileaflet. PFT No flowsheet data found.      Other ASA reactivity:   Pre-albumin:   Ionized Calcium:   NH4:   T3, FT4:  Cortisol:  Urine Osm:  Urine Lytes:   HbA1c:      Recent Results (from the past 24 hour(s))   GLUCOSE, POC    Collection Time: 07/09/18 11:52 AM   Result Value Ref Range    Glucose (POC) 304 (H) 70 - 110 mg/dL   GLUCOSE, POC    Collection Time: 07/09/18  4:26 PM   Result Value Ref Range    Glucose (POC) 285 (H) 70 - 110 mg/dL   GLUCOSE, POC    Collection Time: 07/09/18 10:13 PM   Result Value Ref Range    Glucose (POC) 233 (H) 70 - 110 mg/dL   CBC WITH AUTOMATED DIFF    Collection Time: 07/10/18  2:30 AM   Result Value Ref Range    WBC 11.4 4.6 - 13.2 K/uL    RBC 4.11 (L) 4.20 - 5.30 M/uL    HGB 12.2 12.0 - 16.0 g/dL    HCT 36.9 35.0 - 45.0 %    MCV 89.8 74.0 - 97.0 FL    MCH 29.7 24.0 - 34.0 PG    MCHC 33.1 31.0 - 37.0 g/dL    RDW 13.4 11.6 - 14.5 %    PLATELET 404 318 - 749 K/uL    MPV 9.4 9.2 - 11.8 FL    NEUTROPHILS 69 40 - 73 %    LYMPHOCYTES 24 21 - 52 %    MONOCYTES 7 3 - 10 %    EOSINOPHILS 0 0 - 5 %    BASOPHILS 0 0 - 2 %    ABS. NEUTROPHILS 7.9 1.8 - 8.0 K/UL    ABS. LYMPHOCYTES 2.8 0.9 - 3.6 K/UL    ABS. MONOCYTES 0.7 0.05 - 1.2 K/UL    ABS. EOSINOPHILS 0.0 0.0 - 0.4 K/UL    ABS. BASOPHILS 0.0 0.0 - 0.06 K/UL    DF AUTOMATED     METABOLIC PANEL, BASIC    Collection Time: 07/10/18  2:30 AM   Result Value Ref Range    Sodium 137 136 - 145 mmol/L    Potassium 3.9 3.5 - 5.5 mmol/L    Chloride 102 100 - 108 mmol/L    CO2 31 21 - 32 mmol/L    Anion gap 4 3.0 - 18 mmol/L    Glucose 247 (H) 74 - 99 mg/dL    BUN 20 (H) 7.0 - 18 MG/DL    Creatinine 1.05 0.6 - 1.3 MG/DL    BUN/Creatinine ratio 19 12 - 20      GFR est AA >60 >60 ml/min/1.73m2    GFR est non-AA 54 (L) >60 ml/min/1.73m2    Calcium 9.3 8.5 - 10.1 MG/DL   GLUCOSE, POC    Collection Time: 07/10/18  8:35 AM   Result Value Ref Range    Glucose (POC) 126 (H) 70 - 110 mg/dL           [x]See my orders for details      [] Total clinical care time exclusive of procedures 30 minutes with complex decision making, coordination of care and counseling patient performed and > 50% time spent in face to face evaluation.       [address] [place] [address],[facility], [id number], DO  7/10/2018

## 2018-07-10 NOTE — DIABETES MGMT
Glycemic Control Plan of Care    T2DM with current A1c of 7.9% (07/06/2018). See separate notes regarding assessment of home diabetes management and education, 07/09/2018. Home diabetes meds: Lantus (Basaglar, pen) 25 units daily at bedtime and novolog sliding scale insulin. BG still above target range. POC BG range on 07/09/2018: 233-304 mg/dL. POC BG report on 07/10/2018 at time of review: 126, 323 mg/dL. Patient on 60 mg of prednisone and stated that she also drank orange juice this morning with breakfast. Patient stated that she does not drink fruit juice at home. Patient stated that she's schedule for discharge to home later today, 07/10/2018, with plan for taper dose of prednisone. Patient stated that she will f/u with Dr. Camryn Guzman, pulmonologist. Patient verbalized understanding about the importance of taking prednisone as prescribed and not to stop it abruptly on her own. She verbalized understanding and agreed to contact physician if she has any issues and concerns about prednisone. Recommendation(s):  1.) Continue with insulin at discharge. Assessment:  Patient is 61year old with past medical history including type 2 diabetes mellitus, asthma, COPD, GERD, hyperlipidemia, hypertension, and SHERRIE on Cpap - was admitted on 07/07/2018 with report of increased shortness of breath. Noted:  Severe COPD (stage 3) with significant asthma. T2DM with current A1c of 7.9% (07/06/2018). Most recent blood glucose values:    Results for Andrey Reynaga (MRN [id number]) as of 7/10/2018 12:41   Ref. Range 7/9/2018 08:08 7/9/2018 11:52 7/9/2018 16:26 7/9/2018 22:13   GLUCOSE,FAST - POC Latest Ref Range: 70 - 110 mg/dL 271 (H) 304 (H) 285 (H) 233 (H)     Results for Andrey Reynaga (MRN [id number]) as of 7/10/2018 12:41   Ref.  Range 7/10/2018 08:35 7/10/2018 12:23   GLUCOSE,FAST - POC Latest Ref Range: 70 - 110 mg/dL 126 (H) 232 (H)     Current A1C: 7.9% (07/06/2018) is equivalent to estimated average blood glucose of 180 mg/dL during the past 2-3 months. Current hospital diabetes medications:  Basal lantus insulin 33 units daily at bedtime. Correctional lispro insulin ACHS. Very resistant dose. Total daily dose insulin requirement previous day: 07/09/2018:  Lantus: 33 units  Lispro: 36 units  TDD: 69 units of insulin    Home diabetes medications: Patient reported on 07/09/2018:  Lantus (Basaglar, pen) insulin 25 units daily at bedtime. Novolog sliding scale insulin. Diet: Diabetic consistent carb regular. Goals:  Blood glucose will be within target range of  mg/dL by 07/12/2018.     Education:  __X_  Refer to Diabetes Education Record: 07/09/2018.             ___  Education not indicated at this time    Alyce Bobby RN [facility]  Pager: [phone]

## 2018-07-10 NOTE — PROGRESS NOTES
Care Management Specialist reviewed over the Important Medicare Rights Letter with patient. Patient signed the letter. Patient given a copy of letter by bedside. Signed copy placed into patient chart.

## 2018-07-12 ENCOUNTER — HOME CARE VISIT (OUTPATIENT)
Dept: HOME HEALTH SERVICES | Facility: HOME HEALTH | Age: 58
End: 2018-07-12

## 2018-07-12 LAB
A ALTERNATA IGE QN: <0.1 KU/L
A FUMIGATUS IGE QN: <0.1 KU/L
AMER ROACH IGE QN: <0.1 KU/L
AMER SYCAMORE IGE QN: <0.1 KU/L
BAHIA GRASS IGE QN: <0.1 KU/L
BERMUDA GRASS IGE QN: <0.1 KU/L
BOXELDER IGE QN: <0.1 KU/L
C HERBARUM IGE QN: <0.1 KU/L
CAT DANDER IGG QN: <0.1 KU/L
CLASS DESCRIPTION, 600268: NORMAL
COMMON RAGWEED IGE QN: <0.1 KU/L
D FARINAE IGE QN: <0.1 KU/L
D PTERONYSS IGE QN: <0.1 KU/L
DEPRECATED IGE QN: <0.1 KU/L
DOG DANDER IGE QN: <0.1 KU/L
ENGL PLANTAIN IGE QN: <0.1 KU/L
IGE SERPL-ACNC: 157 IU/ML (ref 0–100)
JOHNSON GRASS IGE QN: <0.1 KU/L
M RACEMOSUS IGE QN: <0.1 KU/L
MT JUNIPER IGE QN: <0.1 KU/L
MUGWORT IGE QN: <0.1 KU/L
NETTLE IGE QN: <0.1 KU/L
P NOTATUM IGE QN: <0.1 KU/L
S BOTRYOSUM IGE QN: <0.1 KU/L
SHEEP SORREL IGE QN: <0.1 KU/L
SWEET GUM IGE QN: <0.1 KU/L
TIMOTHY IGE QN: <0.1 KU/L
WHITE BIRCH IGE QN: <0.1 KU/L
WHITE ELM IGG QN: <0.1 KU/L
WHITE HICKORY IGE QN: <0.1 KU/L
WHITE MULBERRY IGE QN: <0.1 KU/L
WHITE OAK IGE QN: <0.1 KU/L

## 2018-07-13 ENCOUNTER — TELEPHONE (OUTPATIENT)
Dept: PULMONOLOGY | Age: 58
End: 2018-07-13

## 2018-07-13 ENCOUNTER — HOME CARE VISIT (OUTPATIENT)
Dept: HOME HEALTH SERVICES | Facility: HOME HEALTH | Age: 58
End: 2018-07-13

## 2018-07-13 NOTE — TELEPHONE ENCOUNTER
PT [phone]). PT CANCELLED TODAY'S APPOINTMENT D/T BEING LATE. WANTS TO TALK TO Grady Francois 27.

## 2018-07-13 NOTE — TELEPHONE ENCOUNTER
Pt missed appt this am for hospital f/u. After review appt given for Dr. Roshan Escamilla 7/16/18 at 3:30.  L/M for daughter or pt to call us back to inform

## 2018-07-16 ENCOUNTER — HOME CARE VISIT (OUTPATIENT)
Dept: SCHEDULING | Facility: HOME HEALTH | Age: 58
End: 2018-07-16

## 2018-07-16 ENCOUNTER — OFFICE VISIT (OUTPATIENT)
Dept: PULMONOLOGY | Age: 58
End: 2018-07-16

## 2018-07-16 VITALS
OXYGEN SATURATION: 97 % | HEIGHT: 63 IN | RESPIRATION RATE: 20 BRPM | BODY MASS INDEX: 46.78 KG/M2 | SYSTOLIC BLOOD PRESSURE: 156 MMHG | TEMPERATURE: 97.3 F | DIASTOLIC BLOOD PRESSURE: 80 MMHG | HEART RATE: 69 BPM | WEIGHT: 264 LBS

## 2018-07-16 DIAGNOSIS — J96.11 CHRONIC RESPIRATORY FAILURE WITH HYPOXIA (HCC): ICD-10-CM

## 2018-07-16 DIAGNOSIS — J44.1 COPD EXACERBATION (HCC): ICD-10-CM

## 2018-07-16 DIAGNOSIS — Z99.89 OSA ON CPAP: Primary | ICD-10-CM

## 2018-07-16 DIAGNOSIS — G47.33 OSA ON CPAP: Primary | ICD-10-CM

## 2018-07-16 PROCEDURE — G0299 HHS/HOSPICE OF RN EA 15 MIN: HCPCS

## 2018-07-16 NOTE — PATIENT INSTRUCTIONS
Continue Incruse1 inhalation daily and remember to exhale fully before inhaling    Continue Advair 2 puffs twice daily and remember to wash mouth with water and spit it out after inhaling    Albuterol by nebulizer or by hand-held inhaler (2 puffs) every 4 hours as needed if you require albuterol too often to control respiratory symptoms call the office for severe symptoms go to the emergency room    Prednisone 2 tablets (40 mg) every morning with breakfast for 7 more days then discontinue    Continue supplemental oxygen with all activities including sleep and rest    Try walking without stopping once or twice a day gradually increasing the distance you walk as tolerated    Always call for symptoms such as increasing shortness of breath or cough productive of discolored mucus

## 2018-07-16 NOTE — PROGRESS NOTES
[facility]  Pulmonary, Critical Care, and Sleep Medicine      Chief complaint:  Acute exacerbation of COPD chronic respiratory failure due to COPD SHERRIE    HPI:    Namita Pickett    is 61years old returns the office today for follow-up concerning an acute exacerbation of COPD since leaving the hospital the patient relates she is been on 60 mg of prednisone as well as Advair Incruse and albuterol and supplemental oxygen 2 L. She says she has difficulty doing any kind of activity but she is not coughing anymore and has no chest pain she continues to have swelling in her lower extremities primarily her feet. She reports crying and depression which she attributes to the prednisone she tries to use her CPAP but says it is intolerable to her at times and is working with Dr. Cmailo Alves concerning these issues      Allergies   Allergen Reactions    Ancef [Cefazolin] Hives    Contrast Agent [Iodine] Anaphylaxis, Shortness of Breath and Swelling     Needs pre-medication for IV contrast with Benadryl, Solu-Medrol    Fish Containing Products Anaphylaxis     Pt states she had a severe allergic reaction at 7 y/o.  Metformin Other (comments)     Abdominal pain, diarrhea.  Codeine Other (comments)     Altered mental status     Current Outpatient Prescriptions   Medication Sig    insulin glargine (LANTUS,BASAGLAR) 100 unit/mL (3 mL) inpn 30 Units by SubCUTAneous route nightly. Indications: type 2 diabetes mellitus    NOVOLOG FLEXPEN U-100 INSULIN 100 unit/mL inpn Continue home Sliding scale insulin as prior to admission    fluticasone-salmeterol (ADVAIR DISKUS) 100-50 mcg/dose diskus inhaler Take 1 Puff by inhalation every twelve (12) hours.  predniSONE (DELTASONE) 20 mg tablet Take 3 tablets for 9 days. Then take 2 tables for 10 days. Then take 1 tablet for 10 days. Then take 1/2 tablet for 10 days. Then take 1/4 tablet for 10 days. Then stop.   Indications: COPD    umeclidinium (INCRUSE ELLIPTA) 62.5 mcg/actuation inhaler Take 1 Puff by inhalation daily. Indications: BRONCHOSPASM PREVENTION WITH COPD    Diabetic Supplies, [employer]. misc Diabetic needles 1/2 inch 31 gauge - 1 injection daily    Diabetic Supplies, [facility]. misc Diabetic test strips - use four times daily    Diabetic Supplies, Jannet Scot. misc Diabetic lancets - use three times daily    ipratropium (ATROVENT) 0.02 % soln 2.5 mL by Nebulization route four (4) times daily as needed. Indications: BRONCHOSPASM PREVENTION WITH COPD    albuterol (PROVENTIL VENTOLIN) 2.5 mg /3 mL (0.083 %) nebulizer solution 3 mL by Nebulization route every four (4) hours as needed for Wheezing. Indications: BRONCHOSPASM PREVENTION, Chronic Obstructive Pulmonary Disease    omeprazole (PRILOSEC) 40 mg capsule Take 40 mg by mouth daily. Indications: gastroesophageal reflux disease    lisinopril (PRINIVIL, ZESTRIL) 40 mg tablet Take 20 mg by mouth two (2) times a day. Indications: hypertension    simethicone (MYLICON) 80 mg chewable tablet Take 80 mg by mouth every six (6) hours as needed for Flatulence.  cpap machine kit by Does Not Apply route nightly.  OXYGEN-AIR DELIVERY SYSTEMS 2 L by Nasal route continuous. [employer]    fluticasone-salmeterol (ADVAIR HFA) 230-21 mcg/actuation inhaler Take 2 Puffs by inhalation two (2) times a day.  Diabetic Supplies, [employer]ann[employer]. misc Diabetic syringes 1 mL - use one daily    aspirin delayed-release 81 mg tablet Take 81 mg by mouth daily.  famotidine (PEPCID) 20 mg tablet Take 20 mg by mouth two (2) times daily as needed.  albuterol (PROVENTIL HFA, VENTOLIN HFA, PROAIR HFA) 90 mcg/actuation inhaler Take 2 Puffs by inhalation every four (4) hours as needed for Wheezing. For the next 2 days after hospital discharge, use your inhaler 4 times a day.  Inhalational Spacing Device (AEROCHAMBER) 1 Each by Does Not Apply route as needed for Cough or Other (COPD exacerbation).     fluticasone (FLONASE) 50 mcg/actuation nasal spray 2 Sprays by Both Nostrils route daily.  montelukast (SINGULAIR) 10 mg tablet Take 10 mg by mouth nightly. No current facility-administered medications for this visit. Past Medical History:   Diagnosis Date    Asthma     COPD     Cystocele, midline     Diabetes mellitus ([facility] [address]     GERD (gastroesophageal reflux disease)     Hidradenitis suppurativa     Hyperlipidemia     Hypertension     SHERRIE on CPAP     CPAP    Stress incontinence      Past Surgical History:   Procedure Laterality Date    BREAST SURGERY PROCEDURE UNLISTED      Right breast benign tumor removal    HX APPENDECTOMY      HX CHOLECYSTECTOMY      HX DILATION AND CURETTAGE      Dysfunctional uterine bleeding, thought 2/2 fibroids    HX TUBAL LIGATION       Social History     Social History    Marital status: LEGALLY      Spouse name: N/A    Number of children: N/A    Years of education: N/A     Occupational History    Not on file.      Social History Main Topics    Smoking status: Former Smoker     Packs/day: 1.00     Years: 30.00     Types: Cigarettes     Start date: 5/6/1966     Quit date: 9/6/2006    Smokeless tobacco: Never Used      Comment: 1-1.5 packs per day    Alcohol use No    Drug use: No    Sexual activity: No     Other Topics Concern    Not on file     Social History Narrative     Family History   Problem Relation Age of Onset    Hypertension Mother     Stroke Mother     Breast Cancer Mother      Bilateral mastectomies    Cancer Mother      ovarian and breast    Heart Failure Mother     Heart Attack Father      2011    Heart Surgery Father      CABG    Heart Failure Father     COPD Sister      Heavy smoker    Cancer Sister      ovarian    Heart Failure Sister     Lung Disease Sister     Asthma Child     Cancer Maternal Aunt      pancreatic    Cancer Maternal Grandfather      stomach       Review of systems:  She denies fever chills poor appetite or weight loss    Physical Exam:  Visit Vitals    /80 (BP 1 Location: Left arm, BP Patient Position: Sitting)    Pulse 69    Temp 97.3 °F (36.3 °C) (Oral)    Resp 20    Ht 5' 3\" (1.6 m)    Wt 119.7 kg (264 lb)    SpO2 97%  Comment: 2l    BMI 46.77 kg/m2       Well-developed and obese  HEENT: WNL  Lymph node exam: Supraclavicular cervical lymph nodes negative  Chest: Equal symmetrical expansion no dullness no wheezes rales rubs attenuated breath sounds  Heart: Regular rhythm no gallop no murmur no JVD 1+ pedal  Edema bilaterally  Extremities: No cyanosis clubbing or calf tenderness  Neurological: Alert and oriented  Labs:    O2 sat on 2 L 97% walking    Impression:     Exacerbation of COPD by history and physical exam slow improvement  Psychological side effects related to prednisone    Plan:   Supplemental oxygen 2 L/min with all activity  Reduce prednisone to 40 mg a day for 7 days and discontinue  Continue Incruse Advair as needed albuterol  Follow-up in 2 weeks  Jony Wade MD , [facility]    CC: Meche Garcia MD     [address] [facility]  [place], [address] [address]     P: [phone]     F: [phone]

## 2018-07-16 NOTE — PROGRESS NOTES
Chief Complaint   Patient presents with   [facility] Follow Up     CXR done 7/6    COPD     1. Have you been to the ER, urgent care clinic since your last visit? Hospitalized since your last visit? Yes Where: [facility]    2. Have you seen or consulted any other health care providers outside of the [facility] since your last visit? Include any pap smears or colon screening.  Yes Where: Dr Timmons Resides PCP

## 2018-07-16 NOTE — MR AVS SNAPSHOT
[address] [facility], [address] [address] 
[phone] Patient: Eufemia Ashby MRN: [id number] AHR:3/74/0876 Visit Information Date & Time Provider Department Dept. Phone Encounter #  
 7/16/2018  3:30 PM MD [facility] [id number] Follow-up Instructions Follow-up and Disposition History Your Appointments 7/27/2018 10:30 AM  
Nurse Visit with [facility] [address] ([address]) Appt Note: 6 min walk per [email or web address] [address] [place] [address], [address] [address]  
[phone]  
  
   
 [address] [place] [address], [address] [place] [address],[address] [place] [address]  
  
    
 7/27/2018 11:00 AM  
Follow Up with Marlin Mason, DO [address] ([address]) Appt Note: FROM 5/30/18  
 [address] [place] [address], [address] [address]  
[phone]  
  
   
 [address] [place] [address], [address] [place] [address],[address] [place] [address]  
  
    
 7/30/2018 11:15 AM  
Office Visit with Sharee Knapp MD  
[facility]) Appt Note: post echo/event/labs [address] [facility], [address] [facility] [address] [address], [address] Upcoming Health Maintenance Date Due Hepatitis C Screening 1960 FOOT EXAM Q1 8/21/1969 MICROALBUMIN Q1 8/21/1969 EYE EXAM RETINAL OR DILATED Q1 8/21/1969 Pneumococcal 19-64 Medium Risk (1 of 1 - PPSV23) 8/21/1978 DTaP/Tdap/Td series (1 - Tdap) 8/21/1980 PAP AKA CERVICAL CYTOLOGY 8/21/1980 FOBT Q 1 YEAR AGE 50-75 8/21/2009 LIPID PANEL Q1 11/20/2013 MEDICARE YEARLY EXAM 3/14/2018 Influenza Age 5 to Adult 8/1/2018 BREAST CANCER SCRN MAMMOGRAM 11/16/2018 HEMOGLOBIN A1C Q6M 1/6/2019 Allergies as of 7/16/2018  Review Complete On: 7/16/2018 By: Teresita Ortiz Мария Solitario, LPN Severity Noted Reaction Type Reactions Ancef [Cefazolin] High 09/30/2012    Hives Contrast Agent [Iodine] High 09/30/2012    Anaphylaxis, Shortness of Breath, Swelling Needs pre-medication for IV contrast with Benadryl, Solu-Medrol Fish Containing Products High 02/10/2017   Intolerance Anaphylaxis Pt states she had a severe allergic reaction at 9 y/o. Metformin  05/24/2015   Intolerance Other (comments) Abdominal pain, diarrhea. Codeine Low 09/30/2012    Other (comments) Altered mental status Current Immunizations  Never Reviewed Name Date Influenza Vaccine (Quad) PF 1/26/2018  4:19 PM  
  
 Not reviewed this visit You Were Diagnosed With   
  
 Codes Comments SHERRIE on CPAP    -  Primary ICD-10-CM: G47.33, Z99.89 ICD-9-CM: 327.23, V46.8 Chronic respiratory failure with hypoxia (HCC)     ICD-10-CM: J96.11 
ICD-9-CM: 518.83, 799.02   
 COPD exacerbation ([facility] [address]     ICD-10-CM: J44.1 ICD-9-CM: 491.21 Vitals BP Pulse Temp Resp Height(growth percentile) Weight(growth percentile) 156/80 (BP 1 Location: Left arm, BP Patient Position: Sitting) 69 97.3 °F (36.3 °C) (Oral) 20 5' 3\" (1.6 m) 264 lb (119.7 kg) SpO2 BMI OB Status Smoking Status 97% 46.77 kg/m2 Postmenopausal Former Smoker Vitals History BMI and BSA Data Body Mass Index Body Surface Area  
 46.77 kg/m 2 2.31 m 2 Preferred Pharmacy Pharmacy Name Phone [address]. [phone] Your Updated Medication List  
  
   
This list is accurate as of 7/16/18  4:32 PM.  Always use your most recent med list.  
  
  
  
  
 * ADVAIR -20 mcg/actuation inhaler Generic drug:  fluticasone-salmeterol Take 2 Puffs by inhalation two (2) times a day. * fluticasone-salmeterol 100-50 mcg/dose diskus inhaler Commonly known as:  ADVAIR DISKUS Take 1 Puff by inhalation every twelve (12) hours. * albuterol 90 mcg/actuation inhaler Commonly known as:  PROVENTIL HFA, VENTOLIN HFA, PROAIR HFA Take 2 Puffs by inhalation every four (4) hours as needed for Wheezing. For the next 2 days after hospital discharge, use your inhaler 4 times a day. * albuterol 2.5 mg /3 mL (0.083 %) nebulizer solution Commonly known as:  PROVENTIL VENTOLIN  
3 mL by Nebulization route every four (4) hours as needed for Wheezing. Indications: BRONCHOSPASM PREVENTION, Chronic Obstructive Pulmonary Disease  
  
 aspirin delayed-release 81 mg tablet Take 81 mg by mouth daily. cpap machine kit  
by Does Not Apply route nightly. * Diabetic Supplies, [address] [facility] Diabetic syringes 1 mL - use one daily * Diabetic Supplies, [address] [facility] Diabetic needles 1/2 inch 31 gauge - 1 injection daily * Diabetic Supplies, [address] [facility] Diabetic test strips - use four times daily * Diabetic Supplies, [address] [facility] Diabetic lancets - use three times daily  
  
 famotidine 20 mg tablet Commonly known as:  PEPCID Take 20 mg by mouth two (2) times daily as needed. FLONASE 50 mcg/actuation nasal spray Generic drug:  fluticasone 2 Sprays by Both Nostrils route daily. inhalational spacing device Commonly known as:  AEROCHAMBER  
1 Each by Does Not Apply route as needed for Cough or Other (COPD exacerbation). insulin glargine 100 unit/mL (3 mL) Inpn Commonly known as:  LANTUS,BASAGLAR  
30 Units by SubCUTAneous route nightly. Indications: type 2 diabetes mellitus  
  
 ipratropium 0.02 % Soln Commonly known as:  ATROVENT  
2.5 mL by Nebulization route four (4) times daily as needed. Indications: BRONCHOSPASM PREVENTION WITH COPD  
  
 lisinopril 40 mg tablet Commonly known as:  Catalina Escort Take 20 mg by mouth two (2) times a day. Indications: hypertension NovoLOG Flexpen U-100 Insulin 100 unit/mL Inpn Generic drug:  insulin aspart U-100 Continue home Sliding scale insulin as prior to admission  
  
 omeprazole 40 mg capsule Commonly known as:  PRILOSEC Take 40 mg by mouth daily. Indications: gastroesophageal reflux disease OXYGEN-AIR DELIVERY SYSTEMS  
2 L by Nasal route continuous. [employer]  
  
 predniSONE 20 mg tablet Commonly known as:  Rosalia Buchanan Take 3 tablets for 9 days. Then take 2 tables for 10 days. Then take 1 tablet for 10 days. Then take 1/2 tablet for 10 days. Then take 1/4 tablet for 10 days. Then stop. Indications: COPD  
  
 simethicone 80 mg chewable tablet Commonly known as:  Eunice Blander Take 80 mg by mouth every six (6) hours as needed for Flatulence. SINGULAIR 10 mg tablet Generic drug:  montelukast  
Take 10 mg by mouth nightly. umeclidinium 62.5 mcg/actuation inhaler Commonly known as:  INCRUSE ELLIPTA Take 1 Puff by inhalation daily. Indications: BRONCHOSPASM PREVENTION WITH COPD * Notice: This list has 8 medication(s) that are the same as other medications prescribed for you. Read the directions carefully, and ask your doctor or other care provider to review them with you. Patient Instructions Continue Incruse1 inhalation daily and remember to exhale fully before inhaling Continue Advair 2 puffs twice daily and remember to wash mouth with water and spit it out after inhaling Albuterol by nebulizer or by hand-held inhaler (2 puffs) every 4 hours as needed if you require albuterol too often to control respiratory symptoms call the office for severe symptoms go to the emergency room Prednisone 2 tablets (40 mg) every morning with breakfast for 7 more days then discontinue Continue supplemental oxygen with all activities including sleep and rest 
 
Try walking without stopping once or twice a day gradually increasing the distance you walk as tolerated Always call for symptoms such as increasing shortness of breath or cough productive of discolored mucus Patient Instructions History Introducing [facility]! [facility] introduces [employer] patient portal. Now you can access parts of your medical record, email your doctor's office, and request medication refills online. 1. In your internet browser, go to [email or web address] 2. Click on the First Time User? Click Here link in the Sign In box. You will see the New Member Sign Up page. 3. Enter your [employer] Access Code exactly as it appears below. You will not need to use this code after youve completed the sign-up process. If you do not sign up before the expiration date, you must request a new code. · [employer] Access Code: [email or web address] Expires: 10/14/2018  8:26 AM 
 
4. Enter the last four digits of your Social Security Number (xxxx) and Date of Birth (mm/dd/yyyy) as indicated and click Submit. You will be taken to the next sign-up page. 5. Create a [employer] ID. This will be your [employer] login ID and cannot be changed, so think of one that is secure and easy to remember. 6. Create a [employer] password. You can change your password at any time. 7. Enter your Password Reset Question and Answer. This can be used at a later time if you forget your password. 8. Enter your e-mail address. You will receive e-mail notification when new information is available in [address] 9. Click Sign Up. You can now view and download portions of your medical record. 10. Click the Download Summary menu link to download a portable copy of your medical information. If you have questions, please visit the Frequently Asked Questions section of the [employer] website. Remember, [employer] is NOT to be used for urgent needs. For medical emergencies, dial 911. Now available from your iPhone and Android! Please provide this summary of care documentation to your next provider. Your primary care clinician is listed as HAYDEN Banks.  If you have any questions after today's visit, please call [phone].

## 2018-07-26 ENCOUNTER — HOSPITAL ENCOUNTER (EMERGENCY)
Age: 58
Discharge: HOME | End: 2018-07-27
Attending: EMERGENCY MEDICINE
Payer: MEDICARE

## 2018-07-26 ENCOUNTER — APPOINTMENT (OUTPATIENT)
Dept: GENERAL RADIOLOGY | Age: 58
End: 2018-07-26
Attending: GENERAL PRACTICE
Payer: MEDICARE

## 2018-07-26 DIAGNOSIS — J44.9 CHRONIC OBSTRUCTIVE PULMONARY DISEASE, UNSPECIFIED COPD TYPE (HCC): ICD-10-CM

## 2018-07-26 DIAGNOSIS — R06.02 SOB (SHORTNESS OF BREATH): Primary | ICD-10-CM

## 2018-07-26 LAB
BASOPHILS # BLD: 0 K/UL (ref 0–0.1)
BASOPHILS NFR BLD: 0 % (ref 0–2)
DIFFERENTIAL METHOD BLD: ABNORMAL
EOSINOPHIL # BLD: 0.1 K/UL (ref 0–0.4)
EOSINOPHIL NFR BLD: 1 % (ref 0–5)
ERYTHROCYTE [DISTWIDTH] IN BLOOD BY AUTOMATED COUNT: 13.6 % (ref 11.6–14.5)
HCT VFR BLD AUTO: 40.8 % (ref 35–45)
HGB BLD-MCNC: 14.5 G/DL (ref 12–16)
LYMPHOCYTES # BLD: 2.1 K/UL (ref 0.9–3.6)
LYMPHOCYTES NFR BLD: 34 % (ref 21–52)
MCH RBC QN AUTO: 30.2 PG (ref 24–34)
MCHC RBC AUTO-ENTMCNC: 35.5 G/DL (ref 31–37)
MCV RBC AUTO: 85 FL (ref 74–97)
MONOCYTES # BLD: 0.5 K/UL (ref 0.05–1.2)
MONOCYTES NFR BLD: 7 % (ref 3–10)
NEUTS SEG # BLD: 3.5 K/UL (ref 1.8–8)
NEUTS SEG NFR BLD: 58 % (ref 40–73)
PLATELET # BLD AUTO: 201 K/UL (ref 135–420)
PMV BLD AUTO: 9.1 FL (ref 9.2–11.8)
RBC # BLD AUTO: 4.8 M/UL (ref 4.2–5.3)
WBC # BLD AUTO: 6.1 K/UL (ref 4.6–13.2)

## 2018-07-26 PROCEDURE — 83880 ASSAY OF NATRIURETIC PEPTIDE: CPT

## 2018-07-26 PROCEDURE — 99285 EMERGENCY DEPT VISIT HI MDM: CPT

## 2018-07-26 PROCEDURE — 85025 COMPLETE CBC W/AUTO DIFF WBC: CPT

## 2018-07-26 PROCEDURE — 93005 ELECTROCARDIOGRAM TRACING: CPT

## 2018-07-26 PROCEDURE — 71045 X-RAY EXAM CHEST 1 VIEW: CPT

## 2018-07-26 PROCEDURE — 80048 BASIC METABOLIC PNL TOTAL CA: CPT

## 2018-07-27 VITALS
OXYGEN SATURATION: 97 % | HEART RATE: 101 BPM | SYSTOLIC BLOOD PRESSURE: 144 MMHG | TEMPERATURE: 97.6 F | RESPIRATION RATE: 23 BRPM | DIASTOLIC BLOOD PRESSURE: 69 MMHG

## 2018-07-27 LAB
ANION GAP SERPL CALC-SCNC: 11 MMOL/L (ref 3–18)
ATRIAL RATE: 103 BPM
BNP SERPL-MCNC: 24 PG/ML (ref 0–900)
BUN SERPL-MCNC: 11 MG/DL (ref 7–18)
BUN/CREAT SERPL: 10 (ref 12–20)
CALCIUM SERPL-MCNC: 9.6 MG/DL (ref 8.5–10.1)
CALCULATED P AXIS, ECG09: 43 DEGREES
CALCULATED R AXIS, ECG10: 7 DEGREES
CALCULATED T AXIS, ECG11: 39 DEGREES
CHLORIDE SERPL-SCNC: 104 MMOL/L (ref 100–108)
CO2 SERPL-SCNC: 25 MMOL/L (ref 21–32)
CREAT SERPL-MCNC: 1.11 MG/DL (ref 0.6–1.3)
DIAGNOSIS, 93000: NORMAL
GLUCOSE SERPL-MCNC: 238 MG/DL (ref 74–99)
P-R INTERVAL, ECG05: 144 MS
POTASSIUM SERPL-SCNC: 3.9 MMOL/L (ref 3.5–5.5)
Q-T INTERVAL, ECG07: 354 MS
QRS DURATION, ECG06: 78 MS
QTC CALCULATION (BEZET), ECG08: 463 MS
SODIUM SERPL-SCNC: 140 MMOL/L (ref 136–145)
VENTRICULAR RATE, ECG03: 103 BPM

## 2018-07-27 NOTE — ED TRIAGE NOTES
Pt arrived via EMS on stretcher with complaints of respiratory distress. Pt had altercations at home this evening and was upset from family issues. Pt has a history of COPD and wears 2L nasal O2 at home and uses a CPAP to sleep. Pt states she has a heart monitor on due to possible heart issues \" my heart has been beating out of my chest.\"  Pt states Dr is determining if it's her heart or if pt requires a BiPAP at home.

## 2018-07-27 NOTE — ED NOTES
I personally saw and examined the patient and chart was reviewed prior to disposition. I have reviewed and agree with the midlevel findings, including all diagnostic interpretations, and plans as written. I was present during the key portions of separately billed procedures. Oseas Cabral MD    Patient with shortness of breath, this is very typical for her COPD exacerbation, and states that this is not different and does not want it worked up for a heart problem, she is given breathing treatment she has recently tapered off her steroid treatment because of side effects of hyperglycemia and crying. She had a fairly unremarkable workup here she is treated with nebulizer treatments and felt much that her, she wishes to be discharged at this time. She is given strict return precautions, declined any additional steroid treatment.   She'll follow-up with her own pulmonologist for further management    Heart and lungs are clear abdomen benign, lower extremities without asymmetry or pain on palpation of calves

## 2018-07-27 NOTE — ED NOTES
Pt resting on stretcher with no acute distress observed. Comfort breathing at 4L nasal O2. Will decrease to 2L. Call bell with in reach. Pt expressed no further needs at this time. Will continue to monitor pt and carry out orders.

## 2018-07-27 NOTE — DISCHARGE INSTRUCTIONS
Using a Metered-Dose Inhaler: Care Instructions  Your Care Instructions    A metered-dose inhaler lets you breathe medicine into your lungs quickly. Inhaled medicine works faster than the same medicine in a pill. An inhaler allows you to take less medicine than you would need if you took it as a pill. \"Metered-dose\" means that the inhaler gives a measured amount of medicine each time you use it. A metered-dose inhaler gives medicine in the form of a liquid mist.  Your doctor may want you to use a spacer with your inhaler. A spacer is a chamber that you attach to the inhaler. The chamber holds the medicine before you inhale it. That way, you can inhale the medicine in as many breaths as you need. Doctors recommend using a spacer with most metered-dose inhalers, especially those with corticosteroid medicines. Follow-up care is a key part of your treatment and safety. Be sure to make and go to all appointments, and call your doctor if you are having problems. It's also a good idea to know your test results and keep a list of the medicines you take. How can you care for yourself at home? To get started using your inhaler  · Talk with your health care provider to be sure you are using your inhaler the right way. It might help if you practice using it in front of a mirror. Use the inhaler exactly as prescribed. · Check that you have the correct medicine. If you use more than one inhaler, put a label on each one. This will let you know which one to use at the right time. · Keep track of how much medicine is in the inhaler. Check the label to see how many doses are in the container. If you know how many puffs you can take, you can replace the inhaler before you run out. Ask your health care provider how you can keep track of how much medicine is left. · Use a spacer if you have problems pressing the inhaler and breathing in at the same time.  You also may need a spacer if you are using corticosteroid medicines. · If you are using a corticosteroid inhaler, gargle and rinse out your mouth with water after use. Do not swallow the water. Swallowing the water will increase the chance that the medicine will get into your bloodstream. This may make it more likely that you will have side effects. To use a spacer with an inhaler  1. Shake the inhaler. Remove the inhaler cap, and place the mouthpiece of the inhaler into the spacer. Check the inhaler instructions to see if you need to prime your inhaler before you use it. If it needs priming, follow the instructions on how to prime your inhaler. 2. Remove the cap from the spacer. 3. Hold the inhaler upright with the mouthpiece at the bottom. 4. Tilt your head back a little, and breathe out slowly and completely. 5. Place the spacer's mouthpiece in your mouth. 6. Press down on the inhaler to spray one puff of medicine into the spacer, and then start breathing in slowly. Wait to inhale until after you have pressed down on the inhaler. Some spacers have a whistle. If you hear it, you should breathe in more slowly. 7. Hold your breath for 10 seconds. This will let the medicine settle in your lungs. 8. If you need to take a second dose, wait 30 to 60 seconds to allow the inhaler valve to refill. To use an inhaler without a spacer  1. Shake the inhaler as directed. Remove the cap. Check the instructions to see if you need to prime your inhaler before you use it. If it needs priming, follow the instructions on how to prime your inhaler. 2. Hold the inhaler upright with the mouthpiece at the bottom. 3. Tilt your head back a little, and breathe out slowly and completely. 4. Position the inhaler in one of two ways:  ¨ You can place the inhaler in your mouth. This is easier for most people. And it lowers the risk that any of the medicine will get into your eyes. ¨ Or you can place the inhaler 1 to 2 inches in front of your open mouth, without closing your lips over it. Try to open your mouth as wide as you can. Placing the inhaler in front of your open mouth may be better for getting the medicine into your lungs. But some people may find this too hard to do. 5. Start taking slow, even breaths through your mouth. Press down on the inhaler once, then inhale fully. 6. Hold your breath for 10 seconds. This will let the medicine settle in your lungs. 7. If you need to take a second dose, wait 30 to 60 seconds to allow the inhaler valve to refill. Where can you learn more? Go to http://etelvina-olivier.info/. Enter K111 in the search box to learn more about \"Using a Metered-Dose Inhaler: Care Instructions. \"  Current as of: November 12, 2017  Content Version: 11.7  © 2423-9414 Healthwise, Incorporated. Care instructions adapted under license by [employer] (which disclaims liability or warranty for this information). If you have questions about a medical condition or this instruction, always ask your healthcare professional. [place] [address] any warranty or liability for your use of this information.

## 2018-07-27 NOTE — ED NOTES
I have reviewed discharge instructions with patient. The patient verbalized understanding. Patient armband removed and shredded. No acute distress noted at this time, pt alert and oriented. Stable at time of discharge. Vital signs stable.

## 2018-07-30 ENCOUNTER — OFFICE VISIT (OUTPATIENT)
Dept: CARDIOLOGY CLINIC | Age: 58
End: 2018-07-30

## 2018-07-30 VITALS
HEIGHT: 63 IN | SYSTOLIC BLOOD PRESSURE: 133 MMHG | HEART RATE: 77 BPM | DIASTOLIC BLOOD PRESSURE: 72 MMHG | WEIGHT: 253 LBS | BODY MASS INDEX: 44.83 KG/M2

## 2018-07-30 DIAGNOSIS — R00.2 PALPITATIONS: ICD-10-CM

## 2018-07-30 DIAGNOSIS — R09.89 BILATERAL CAROTID BRUITS: ICD-10-CM

## 2018-07-30 DIAGNOSIS — Z99.89 OSA ON CPAP: Chronic | ICD-10-CM

## 2018-07-30 DIAGNOSIS — E66.01 OBESITY, CLASS III, BMI 40-49.9 (MORBID OBESITY) (HCC): ICD-10-CM

## 2018-07-30 DIAGNOSIS — J43.9 PULMONARY EMPHYSEMA, UNSPECIFIED EMPHYSEMA TYPE (HCC): ICD-10-CM

## 2018-07-30 DIAGNOSIS — I50.32 DIASTOLIC CHF, CHRONIC (HCC): Primary | ICD-10-CM

## 2018-07-30 DIAGNOSIS — G47.33 OSA ON CPAP: Chronic | ICD-10-CM

## 2018-07-30 DIAGNOSIS — E78.5 HYPERLIPIDEMIA, UNSPECIFIED HYPERLIPIDEMIA TYPE: Chronic | ICD-10-CM

## 2018-07-30 DIAGNOSIS — I10 ESSENTIAL HYPERTENSION, BENIGN: Chronic | ICD-10-CM

## 2018-07-30 NOTE — LETTER
Lenora Jacobs 1960 7/30/2018 Dear Danny Reis MD 
 
I had the pleasure of evaluating  Ms. Merissa Ramirez in office today. Below are the relevant portions of my assessment and plan of care. ICD-10-CM ICD-9-CM 1. Diastolic CHF, chronic ([facility]) I50.32 428.32   
  428.0 2. Essential hypertension, benign I10 401.1 3. Hyperlipidemia, unspecified hyperlipidemia type E78.5 272.4 LIPID PANEL  
4. SHERRIE on CPAP G47.33 327.23   
 Z99.89 V46.8 5. Obesity, Class III, BMI 40-49.9 (morbid obesity) ([facility]) E66.01 278.01   
6. Pulmonary emphysema, unspecified emphysema type ([facility] [address] J43.9 492.8 7. Bilateral carotid bruits R09.89 785.9 DUPLEX CAROTID BILATERAL 8. Palpitations R00.2 785.1 Current Outpatient Prescriptions Medication Sig Dispense Refill  insulin glargine (LANTUS,BASAGLAR) 100 unit/mL (3 mL) inpn 30 Units by SubCUTAneous route nightly. Indications: type 2 diabetes mellitus 28 Units 0  
 NOVOLOG FLEXPEN U-100 INSULIN 100 unit/mL inpn Continue home Sliding scale insulin as prior to admission 1 Pen 0  
 fluticasone-salmeterol (ADVAIR DISKUS) 100-50 mcg/dose diskus inhaler Take 1 Puff by inhalation every twelve (12) hours. 1 Inhaler 0  
 umeclidinium (INCRUSE ELLIPTA) 62.5 mcg/actuation inhaler Take 1 Puff by inhalation daily. Indications: BRONCHOSPASM PREVENTION WITH COPD 1 Inhaler 0  
 ipratropium (ATROVENT) 0.02 % soln 2.5 mL by Nebulization route four (4) times daily as needed. Indications: BRONCHOSPASM PREVENTION WITH COPD 30 Vial 0  
 albuterol (PROVENTIL VENTOLIN) 2.5 mg /3 mL (0.083 %) nebulizer solution 3 mL by Nebulization route every four (4) hours as needed for Wheezing. Indications: BRONCHOSPASM PREVENTION, Chronic Obstructive Pulmonary Disease 30 Each 0  
 omeprazole (PRILOSEC) 40 mg capsule Take 40 mg by mouth daily. Indications: gastroesophageal reflux disease  lisinopril (PRINIVIL, ZESTRIL) 40 mg tablet Take 20 mg by mouth two (2) times a day. Indications: hypertension  simethicone (MYLICON) 80 mg chewable tablet Take 80 mg by mouth every six (6) hours as needed for Flatulence.  cpap machine kit by Does Not Apply route nightly.  OXYGEN-AIR DELIVERY SYSTEMS 2 L by Nasal route continuous. [facility]  aspirin delayed-release 81 mg tablet Take 81 mg by mouth daily.  famotidine (PEPCID) 20 mg tablet Take 20 mg by mouth two (2) times daily as needed.  albuterol (PROVENTIL HFA, VENTOLIN HFA, PROAIR HFA) 90 mcg/actuation inhaler Take 2 Puffs by inhalation every four (4) hours as needed for Wheezing. For the next 2 days after hospital discharge, use your inhaler 4 times a day. 1 Inhaler 0  
 fluticasone (FLONASE) 50 mcg/actuation nasal spray 2 Sprays by Both Nostrils route daily.  montelukast (SINGULAIR) 10 mg tablet Take 10 mg by mouth nightly. Orders Placed This Encounter  LIPID PANEL Standing Status:   Future Standing Expiration Date:   10/30/2018 If you have questions, please do not hesitate to call me. I look forward to following Ms. Thor Kirby along with you. Sincerely, Kenneth Melo MD

## 2018-07-30 NOTE — PROGRESS NOTES
Patient didn't bring medications, verbally reviewed    1. Have you been to the ER, urgent care clinic since your last visit? Hospitalized since your last visit? Yes When: 7/18 Where: [facility] Reason for visit: COPD    2. Have you seen or consulted any other health care providers outside of the [address] [place] [address] since your last visit? Include any pap smears or colon screening. Yes Where: Pulmonary H/F / PCP H/F     3. Since your last visit, have you had any of the following symptoms?      palpitations, shortness of breath and swelling in legs/arms. 4.  Have you had any blood work, X-rays or cardiac testing? No    5. Where do you normally have your labs drawn? [facility]    6. Do you need any refills today?    No

## 2018-07-30 NOTE — MR AVS SNAPSHOT
[address] [facility] [address] 
 
 
 [address], [address] [facility] [address] 
[phone] Patient: Bill Schmitz MRN: [id number] Visit Information Date & Time Provider Department Dept. Phone Encounter #  
 7/30/2018  8:45 AM Rani Loya MD [facility] [address] [place] [address] [id number] Follow-up Instructions Return in about 6 months (around 1/30/2019), or if symptoms worsen or fail to improve, for post test.  
  
Your Appointments 1/4/2019 10:00 AM  
Office Visit with Rani Loya MD  
[facility]) Appt Note: 6 months post labs, carrotid dop [address], [address] [facility] [address] [address], [address] [facility] [address] [place] Upcoming Health Maintenance Date Due Hepatitis C Screening 1960 FOOT EXAM Q1 8/21/1969 MICROALBUMIN Q1 8/21/1969 EYE EXAM RETINAL OR DILATED Q1 8/21/1969 Pneumococcal 19-64 Medium Risk (1 of 1 - PPSV23) 8/21/1978 DTaP/Tdap/Td series (1 - Tdap) 8/21/1980 PAP AKA CERVICAL CYTOLOGY 8/21/1980 FOBT Q 1 YEAR AGE 50-75 8/21/2009 LIPID PANEL Q1 11/20/2013 MEDICARE YEARLY EXAM 3/14/2018 BREAST CANCER SCRN MAMMOGRAM 11/16/2018 Influenza Age 5 to Adult 8/1/2018 HEMOGLOBIN A1C Q6M 1/6/2019 Allergies as of 7/30/2018  Review Complete On: 7/30/2018 By: Rani Loya MD  
  
 Severity Noted Reaction Type Reactions Ancef [Cefazolin] High 09/30/2012    Hives Contrast Agent [Iodine] High 09/30/2012    Anaphylaxis, Shortness of Breath, Swelling Needs pre-medication for IV contrast with Benadryl, Solu-Medrol Fish Containing Products High 02/10/2017   Intolerance Anaphylaxis Pt states she had a severe allergic reaction at 7 y/o. Metformin  05/24/2015   Intolerance Other (comments) Abdominal pain, diarrhea. Codeine Low 09/30/2012    Other (comments) Altered mental status Current Immunizations  Never Reviewed Name Date Influenza Vaccine (Quad) PF 1/26/2018  4:19 PM  
  
 Not reviewed this visit You Were Diagnosed With   
  
 Codes Comments Diastolic CHF, chronic (HCC)    -  Primary ICD-10-CM: I50.32 
ICD-9-CM: 428.32, 428.0 Essential hypertension, benign     ICD-10-CM: I10 
ICD-9-CM: 401.1 Hyperlipidemia, unspecified hyperlipidemia type     ICD-10-CM: E78.5 ICD-9-CM: 272.4 SHERRIE on CPAP     ICD-10-CM: G47.33, Z99.89 ICD-9-CM: 327.23, V46.8 Obesity, Class III, BMI 40-49.9 (morbid obesity) (HCC)     ICD-10-CM: E66.01 
ICD-9-CM: 278.01   
 Pulmonary emphysema, unspecified emph[facility]sema type [facility]     ICD-10-CM: J43.9 ICD-9-CM: 492.8 Bilateral carotid bruits     ICD-10-CM: R09.89 ICD-9-CM: 785.9 Palpitations     ICD-10-CM: R00.2 ICD-9-CM: 785.1 Vitals BP Pulse Height(growth percentile) Weight(growth percentile) BMI OB Status 133/72 77 5' 3\" (1.6 m) 253 lb (114.8 kg) 44.82 kg/m2 Postmenopausal  
 Smoking Status Former Smoker Vitals History BMI and BSA Data Body Mass Index Body Surface Area 44.82 kg/m 2 2.26 m 2 Preferred Pharmacy Pharmacy Name Phone [address] [phone] Your Updated Medication List  
  
   
This list is accurate as of 7/30/18  9:39 AM.  Always use your most recent med list.  
  
  
  
  
 * albuterol 90 mcg/actuation inhaler Commonly known as:  PROVENTIL HFA, VENTOLIN HFA, PROAIR HFA Take 2 Puffs by inhalation every four (4) hours as needed for Wheezing. For the next 2 days after hospital discharge, use your inhaler 4 times a day. * albuterol 2.5 mg /3 mL (0.083 %) nebulizer solution Commonly known as:  PROVENTIL VENTOLIN  
3 mL by Nebulization route every four (4) hours as needed for Wheezing. Indications: BRONCHOSPASM PREVENTION, Chronic Obstructive Pulmonary Disease aspirin delayed-release 81 mg tablet Take 81 mg by mouth daily. cpap machine kit  
by Does Not Apply route nightly. famotidine 20 mg tablet Commonly known as:  PEPCID Take 20 mg by mouth two (2) times daily as needed. FLONASE 50 mcg/actuation nasal spray Generic drug:  fluticasone 2 Sprays by Both Nostrils route daily. fluticasone-salmeterol 100-50 mcg/dose diskus inhaler Commonly known as:  ADVAIR DISKUS Take 1 Puff by inhalation every twelve (12) hours. insulin glargine 100 unit/mL (3 mL) Inpn Commonly known as:  LANTUS,BASAGLAR  
30 Units by SubCUTAneous route nightly. Indications: type 2 diabetes mellitus  
  
 ipratropium 0.02 % Soln Commonly known as:  ATROVENT  
2.5 mL by Nebulization route four (4) times daily as needed. Indications: BRONCHOSPASM PREVENTION WITH COPD  
  
 lisinopril 40 mg tablet Commonly known as:  Hardy Shutters Take 20 mg by mouth two (2) times a day. Indications: hypertension NovoLOG Flexpen U-100 Insulin 100 unit/mL Inpn Generic drug:  insulin aspart U-100 Continue home Sliding scale insulin as prior to admission  
  
 omeprazole 40 mg capsule Commonly known as:  PRILOSEC Take 40 mg by mouth daily. Indications: gastroesophageal reflux disease OXYGEN-AIR DELIVERY SYSTEMS  
2 L by Nasal route continuous. First Choice  
  
 simethicone 80 mg chewable tablet Commonly known as:  Cristina Isrrael Take 80 mg by mouth every six (6) hours as needed for Flatulence. SINGULAIR 10 mg tablet Generic drug:  montelukast  
Take 10 mg by mouth nightly. umeclidinium 62.5 mcg/actuation inhaler Commonly known as:  INCRUSE ELLIPTA Take 1 Puff by inhalation daily. Indications: BRONCHOSPASM PREVENTION WITH COPD * Notice: This list has 2 medication(s) that are the same as other medications prescribed for you. Read the directions carefully, and ask your doctor or other care provider to review them with you. Follow-up Instructions Return in about 6 months (around 1/30/2019), or if symptoms worsen or fail to improve, for post test.  
  
To-Do List   
 Around 07/30/2018 Lab:  LIPID PANEL   
  
 08/06/2018 Vascular/US:  DUPLEX CAROTID BILATERAL Patient Instructions Medications Discontinued During This Encounter Medication Reason  Diabetic Supplies, [address] misc Error  Diabetic Supplies, [address] misc Error  Diabetic Supplies, [address] misc Error  Diabetic Supplies, [address] misc Error  Inhalational Spacing Device (AEROCHAMBER) Error  fluticasone-salmeterol (ADVAIR HFA) 230-21 mcg/actuation inhaler Duplicate Order  predniSONE (DELTASONE) 20 mg tablet Therapy Completed Body Mass Index: Care Instructions Your Care Instructions Body mass index (BMI) can help you see if your weight is raising your risk for health problems. It uses a formula to compare how much you weigh with how tall you are. · A BMI lower than 18.5 is considered underweight. · A BMI between 18.5 and 24.9 is considered healthy. · A BMI between 25 and 29.9 is considered overweight. A BMI of 30 or higher is considered obese. If your BMI is in the normal range, it means that you have a lower risk for weight-related health problems. If your BMI is in the overweight or obese range, you may be at increased risk for weight-related health problems, such as high blood pressure, heart disease, stroke, arthritis or joint pain, and diabetes. If your BMI is in the underweight range, you may be at increased risk for health problems such as fatigue, lower protection (immunity) against illness, muscle loss, bone loss, hair loss, and hormone problems. BMI is just one measure of your risk for weight-related health problems. You may be at higher risk for health problems if you are not active, you eat an unhealthy diet, or you drink too much alcohol or use tobacco products. Follow-up care is a key part of your treatment and safety. Be sure to make and go to all appointments, and call your doctor if you are having problems. It's also a good idea to know your test results and keep a list of the medicines you take. How can you care for yourself at home? · Practice healthy eating habits. This includes eating plenty of fruits, vegetables, whole grains, lean protein, and low-fat dairy. · If your doctor recommends it, get more exercise. Walking is a good choice. Bit by bit, increase the amount you walk every day. Try for at least 30 minutes on most days of the week. · Do not smoke. Smoking can increase your risk for health problems. If you need help quitting, talk to your doctor about stop-smoking programs and medicines. These can increase your chances of quitting for good. · Limit alcohol to 2 drinks a day for men and 1 drink a day for women. Too much alcohol can cause health problems. If you have a BMI higher than 25 · Your doctor may do other tests to check your risk for weight-related health problems. This may include measuring the distance around your waist. A waist measurement of more than 40 inches in men or 35 inches in women can increase the risk of weight-related health problems. · Talk with your doctor about steps you can take to stay healthy or improve your health. You may need to make lifestyle changes to lose weight and stay healthy, such as changing your diet and getting regular exercise. If you have a BMI lower than 18.5 · Your doctor may do other tests to check your risk for health problems. · Talk with your doctor about steps you can take to stay healthy or improve your health. You may need to make lifestyle changes to gain or maintain weight and stay healthy, such as getting more healthy foods in your diet and doing exercises to build muscle. Where can you learn more? Go to http://etelvina-olivier.info/. Enter S176 in the search box to learn more about \"Body Mass Index: Care Instructions. \" Current as of: October 13, 2016 Content Version: 11.4 © 6607-0771 Healthwise, [employer]. Care instructions adapted under license by [employer] (which disclaims liability or warranty for this information). If you have questions about a medical condition or this instruction, always ask your healthcare professional. [address] any warranty or liability for your use of this information. Introducing [facility]! Betina Hewitt introduces Stratus5 patient portal. Now you can access parts of your medical record, email your doctor's office, and request medication refills online. 1. In your internet browser, go to [email or web address] 2. Click on the First Time User? Click Here link in the Sign In box. You will see the New Member Sign Up page. 3. Enter your [email or web address] Access Code exactly as it appears below. You will not need to use this code after youve completed the sign-up process. If you do not sign up before the expiration date, you must request a new code. · [email or web address] Access Code: [email or web address] Expires: 10/14/2018  8:26 AM 
 
4. Enter the last four digits of your Social Security Number (xxxx) and Date of Birth (mm/dd/yyyy) as indicated and click Submit. You will be taken to the next sign-up page. 5. Create a Stratus5 ID. This will be your Stratus5 login ID and cannot be changed, so think of one that is secure and easy to remember. 6. Create a Stratus5 password. You can change your password at any time. 7. Enter your Password Reset Question and Answer. This can be used at a later time if you forget your password. 8. Enter your e-mail address. You will receive e-mail notification when new information is available in [address] 9. Click Sign Up. You can now view and download portions of your medical record. 10. Click the Download Summary menu link to download a portable copy of your medical information. If you have questions, please visit the Frequently Asked Questions section of the [employer] website. Remember, [employer] is NOT to be used for urgent needs. For medical emergencies, dial 911. Now available from your iPhone and Android! Please provide this summary of care documentation to your next provider. Your primary care clinician is listed as K. Tonie Mcardle. If you have any questions after today's visit, please call [phone].

## 2018-07-30 NOTE — PROGRESS NOTES
HISTORY OF PRESENT ILLNESS  Eufemia Ashby is a 61 y.o. female. CHF   The history is provided by the medical records. This is a chronic problem. Associated symptoms include chest pain and shortness of breath. Pertinent negatives include no headaches. Hypertension   The history is provided by the medical records. This is a chronic problem. Associated symptoms include chest pain and shortness of breath. Pertinent negatives include no headaches. Cholesterol Problem   The history is provided by the medical records. This is a chronic problem. Associated symptoms include chest pain and shortness of breath. Pertinent negatives include no headaches. Palpitations    The history is provided by the patient. This is a new problem. The current episode started more than 1 week ago. The problem has been gradually improving. The problem occurs every several days (Usually at night with CPAP). On average, each episode lasts 3 minutes (usually notices during sleep and is awakened, sometimes while awake and resting). The problem is associated with nothing. Associated symptoms include chest pain, lower extremity edema and shortness of breath. Pertinent negatives include no fever, no malaise/fatigue, no claudication, no orthopnea, no PND, no nausea, no vomiting, no headaches, no dizziness and no cough. Her past medical history is significant for hypertension. Chest Pain (Angina)    The history is provided by the patient. This is a new problem. The current episode started more than 1 week ago. The problem occurs daily. The pain is associated with exertion, normal activity and rest (steps, usually with shortness of breath). The pain is present in the substernal region. The quality of the pain is described as tightness. The pain does not radiate. Associated symptoms include lower extremity edema, palpitations and shortness of breath.  Pertinent negatives include no claudication, no cough, no dizziness, no fever, no headaches, no malaise/fatigue, no nausea, no orthopnea, no PND and no vomiting. She has tried rest for the symptoms. Shortness of Breath   The history is provided by the patient. This is a new problem. The problem occurs intermittently. The current episode started more than 1 week ago. The problem has been gradually improving (Since discharge when admitted for COPD exacerbation). Associated symptoms include chest pain and leg swelling. Pertinent negatives include no fever, no headaches, no cough, no wheezing, no PND, no orthopnea, no vomiting, no rash and no claudication. The problem's precipitants include exercise (50 ft; 2LPM for COPD since 2016 approx). Associated medical issues include COPD. Leg Swelling   The history is provided by the patient. This is a new problem. The current episode started more than 1 week ago. The problem occurs every several days. The problem has been gradually improving. Associated symptoms include chest pain and shortness of breath. Pertinent negatives include no headaches. The symptoms are aggravated by standing. The symptoms are relieved by sleep. Review of Systems   Constitutional: Negative for chills, fever, malaise/fatigue and weight loss. HENT: Negative for nosebleeds. Eyes: Negative for discharge. Respiratory: Positive for shortness of breath. Negative for cough and wheezing. Cardiovascular: Positive for chest pain, palpitations and leg swelling. Negative for orthopnea, claudication and PND. Gastrointestinal: Negative for diarrhea, nausea and vomiting. Genitourinary: Negative for dysuria and hematuria. Musculoskeletal: Negative for joint pain. Skin: Negative for rash. Neurological: Negative for dizziness, seizures, loss of consciousness and headaches. Endo/Heme/Allergies: Negative for polydipsia. Does not bruise/bleed easily. Psychiatric/Behavioral: Negative for depression and substance abuse. The patient does not have insomnia.       Allergies Allergen Reactions    Ancef [Cefazolin] Hives    Contrast Agent [Iodine] Anaphylaxis, Shortness of Breath and Swelling     Needs pre-medication for IV contrast with Benadryl, Solu-Medrol    Fish Containing Products Anaphylaxis     Pt states she had a severe allergic reaction at 7 y/o.  Metformin Other (comments)     Abdominal pain, diarrhea.  Codeine Other (comments)     Altered mental status       Past Medical History:   Diagnosis Date    Asthma     COPD     Cystocele, midline     Diabetes mellitus (HCC)     GERD (gastroesophageal reflux disease)     Hidradenitis suppurativa     Hyperlipidemia     Hypertension     SHERRIE on CPAP     CPAP    Stress incontinence        Family History   Problem Relation Age of Onset    Hypertension Mother     Stroke Mother     Breast Cancer Mother      Bilateral mastectomies    Cancer Mother      ovarian and breast    Heart Failure Mother     Heart Attack Father      2011    Heart Surgery Father      CABG    Heart Failure Father     COPD Sister      Heavy smoker    Cancer Sister      ovarian    Heart Failure Sister     Lung Disease Sister     Asthma Child     Cancer Maternal Aunt      pancreatic    Cancer Maternal Grandfather      stomach       Social History   Substance Use Topics    Smoking status: Former Smoker     Packs/day: 1.00     Years: 30.00     Types: Cigarettes     Start date: 5/6/1966     Quit date: 9/6/2006    Smokeless tobacco: Never Used      Comment: 1-1.5 packs per day    Alcohol use No        Current Outpatient Prescriptions   Medication Sig    insulin glargine (LANTUS,BASAGLAR) 100 unit/mL (3 mL) inpn 30 Units by SubCUTAneous route nightly. Indications: type 2 diabetes mellitus    NOVOLOG FLEXPEN U-100 INSULIN 100 unit/mL inpn Continue home Sliding scale insulin as prior to admission    fluticasone-salmeterol (ADVAIR DISKUS) 100-50 mcg/dose diskus inhaler Take 1 Puff by inhalation every twelve (12) hours.     umeclidinium (INCRUSE ELLIPTA) 62.5 mcg/actuation inhaler Take 1 Puff by inhalation daily. Indications: BRONCHOSPASM PREVENTION WITH COPD    ipratropium (ATROVENT) 0.02 % soln 2.5 mL by Nebulization route four (4) times daily as needed. Indications: BRONCHOSPASM PREVENTION WITH COPD    albuterol (PROVENTIL VENTOLIN) 2.5 mg /3 mL (0.083 %) nebulizer solution 3 mL by Nebulization route every four (4) hours as needed for Wheezing. Indications: BRONCHOSPASM PREVENTION, Chronic Obstructive Pulmonary Disease    omeprazole (PRILOSEC) 40 mg capsule Take 40 mg by mouth daily. Indications: gastroesophageal reflux disease    lisinopril (PRINIVIL, ZESTRIL) 40 mg tablet Take 20 mg by mouth two (2) times a day. Indications: hypertension    simethicone (MYLICON) 80 mg chewable tablet Take 80 mg by mouth every six (6) hours as needed for Flatulence.  cpap machine kit by Does Not Apply route nightly.  OXYGEN-AIR DELIVERY SYSTEMS 2 L by Nasal route continuous. [facility]    aspirin delayed-release 81 mg tablet Take 81 mg by mouth daily.  famotidine (PEPCID) 20 mg tablet Take 20 mg by mouth two (2) times daily as needed.  albuterol (PROVENTIL HFA, VENTOLIN HFA, PROAIR HFA) 90 mcg/actuation inhaler Take 2 Puffs by inhalation every four (4) hours as needed for Wheezing. For the next 2 days after hospital discharge, use your inhaler 4 times a day.  fluticasone (FLONASE) 50 mcg/actuation nasal spray 2 Sprays by Both Nostrils route daily.  montelukast (SINGULAIR) 10 mg tablet Take 10 mg by mouth nightly. No current facility-administered medications for this visit.          Past Surgical History:   Procedure Laterality Date    BREAST SURGERY PROCEDURE UNLISTED      Right breast benign tumor removal    HX APPENDECTOMY      HX CHOLECYSTECTOMY      HX DILATION AND CURETTAGE      Dysfunctional uterine bleeding, thought 2/2 fibroids    HX TUBAL LIGATION         Visit Vitals    /72    Pulse 77    Ht 5' 3\" (1.6 m)  Wt 253 lb (114.8 kg)    BMI 44.82 kg/m2       Diagnostic Studies:  I have reviewed the relevant tests done on the patient and show as follows  EKG tracings reviewed by me today. No flowsheet data found. 9/16 Nuc Stress  Conclusion:   1. Normal scan.    2. Evidence of a fixed anterior defect is most likely due to soft tissue attenuation.    3. Normal wall motion and ejection fraction.    4. Low risk scan. 7/18 ECHO  Interpretation Summary   · Definity contrast was given to enhance imaging. · Left ventricular hyperdynamic systolic function. Estimated left ventricular ejection fraction is 66 - 70%. Left ventricular mild concentric hypertrophy observed. Normal left ventricular wall motion, no regional wall motion abnormality noted. Age-appropriate left ventricular diastolic function. · Right ventricle not well visualized. · Aortic valve is probably trileaflet.             Ms. Lyla Jarvis has a reminder for a \"due or due soon\" health maintenance. I have asked that she contact her primary care provider for follow-up on this health maintenance. Physical Exam   Constitutional: She is oriented to person, place, and time. She appears well-developed and well-nourished. No distress. Severely obese   HENT:   Head: Normocephalic and atraumatic. Mouth/Throat: Normal dentition. Eyes: Right eye exhibits no discharge. Left eye exhibits no discharge. No scleral icterus. Neck: Neck supple. No JVD present. Carotid bruit is present (b/l). No thyromegaly present. Cardiovascular: Normal rate, regular rhythm, S1 normal, S2 normal and intact distal pulses. Exam reveals no gallop and no friction rub. Murmur heard. Harsh early systolic murmur is present with a grade of 3/6  at the upper right sternal border radiating to the neck  Pulmonary/Chest: Effort normal and breath sounds normal. She has no wheezes. She has no rales. Abdominal: Soft. She exhibits no mass. There is no tenderness.    Musculoskeletal: She exhibits no edema. Lymphadenopathy:        Right cervical: No superficial cervical adenopathy present. Left cervical: No superficial cervical adenopathy present. Neurological: She is alert and oriented to person, place, and time. Skin: Skin is warm and dry. No rash noted. Psychiatric: She has a normal mood and affect. Her behavior is normal.       ASSESSMENT and PLAN    I have reviewed/discussed the above normal BMI with the patient. I have recommended the following interventions: dietary management education, guidance, and counseling . Maria Fernanda Bianchi HLD : Results for Anjana Croft (MRN [id number]) as of 7/30/2018 09:25   Ref. Range 11/20/2012 03:22   Triglyceride Latest Ref Range: 0 - 150 MG/DL 67   Cholesterol, total Latest Ref Range: 0 - 200 MG/ (H)   HDL Cholesterol Latest Ref Range: 40 - 60 MG/DL 62 (H)   CHOL/HDL Ratio Latest Ref Range: 0 - 5.0   3.5   LDL, calculated Latest Ref Range: 0 - 100 MG/.6 (H)   VLDL, calculated Latest Units: MG/DL 13.4     CHF is compensated. Cardiac condition is stable clinically. Continue present treatment. Check lipids and if needed try a statin other than atorvastatin. Event monitor negative for any significant arrhythmias in June 2018. No more workup. Follow-up with pulmonary, Dr. Gabriella Omer, for her COPD and sleep apnea        Diagnoses and all orders for this visit:    1. Diastolic CHF, chronic ([address]    2. Essential hypertension, benign    3. Hyperlipidemia, unspecified hyperlipidemia type  -     LIPID PANEL; Future    4. SHERRIE on CPAP    5. Obesity, Class III, BMI 40-49.9 (morbid obesity) ([address]    6. Pulmonary emphysema, unspecified emphysema type ([address]    7. Bilateral carotid bruits  -     DUPLEX CAROTID BILATERAL; Future    8. Palpitations        Pertinent laboratory and test data reviewed and discussed with patient.   See patient instructions also for other medical advice given    Medications Discontinued During This Encounter   Medication Reason    Diabetic Supplies, [address] misc Error    Diabetic Supplies, [address] misc Error    Diabetic Supplies, [address] misc Error    Diabetic Supplies, [address]  misc Error    Inhalational Spacing Device (AEROCHAMBER) Error    fluticasone-salmeterol (ADVAIR HFA) 230-21 mcg/actuation inhaler Duplicate Order    predniSONE (DELTASONE) 20 mg tablet Therapy Completed       Follow-up Disposition:  Return in about 6 months (around 1/30/2019), or if symptoms worsen or fail to improve, for post test.

## 2018-07-30 NOTE — PATIENT INSTRUCTIONS
Medications Discontinued During This Encounter   Medication Reason    Diabetic Supplies, [address] misc Error    Diabetic Supplies, [address] misc Error    Diabetic Supplies, [address] misc Error    Diabetic Supplies, [address] misc Error    Inhalational Spacing Device (AEROCHAMBER) Error    fluticasone-salmeterol (ADVAIR HFA) 230-21 mcg/actuation inhaler Duplicate Order    predniSONE (DELTASONE) 20 mg tablet Therapy Completed          Body Mass Index: Care Instructions  Your Care Instructions    Body mass index (BMI) can help you see if your weight is raising your risk for health problems. It uses a formula to compare how much you weigh with how tall you are. · A BMI lower than 18.5 is considered underweight. · A BMI between 18.5 and 24.9 is considered healthy. · A BMI between 25 and 29.9 is considered overweight. A BMI of 30 or higher is considered obese. If your BMI is in the normal range, it means that you have a lower risk for weight-related health problems. If your BMI is in the overweight or obese range, you may be at increased risk for weight-related health problems, such as high blood pressure, heart disease, stroke, arthritis or joint pain, and diabetes. If your BMI is in the underweight range, you may be at increased risk for health problems such as fatigue, lower protection (immunity) against illness, muscle loss, bone loss, hair loss, and hormone problems. BMI is just one measure of your risk for weight-related health problems. You may be at higher risk for health problems if you are not active, you eat an unhealthy diet, or you drink too much alcohol or use tobacco products. Follow-up care is a key part of your treatment and safety. Be sure to make and go to all appointments, and call your doctor if you are having problems. It's also a good idea to know your test results and keep a list of the medicines you take. How can you care for yourself at home?   · Practice healthy eating habits. This includes eating plenty of fruits, vegetables, whole grains, lean protein, and low-fat dairy. · If your doctor recommends it, get more exercise. Walking is a good choice. Bit by bit, increase the amount you walk every day. Try for at least 30 minutes on most days of the week. · Do not smoke. Smoking can increase your risk for health problems. If you need help quitting, talk to your doctor about stop-smoking programs and medicines. These can increase your chances of quitting for good. · Limit alcohol to 2 drinks a day for men and 1 drink a day for women. Too much alcohol can cause health problems. If you have a BMI higher than 25  · Your doctor may do other tests to check your risk for weight-related health problems. This may include measuring the distance around your waist. A waist measurement of more than 40 inches in men or 35 inches in women can increase the risk of weight-related health problems. · Talk with your doctor about steps you can take to stay healthy or improve your health. You may need to make lifestyle changes to lose weight and stay healthy, such as changing your diet and getting regular exercise. If you have a BMI lower than 18.5  · Your doctor may do other tests to check your risk for health problems. · Talk with your doctor about steps you can take to stay healthy or improve your health. You may need to make lifestyle changes to gain or maintain weight and stay healthy, such as getting more healthy foods in your diet and doing exercises to build muscle. Where can you learn more? Go to http://etelvina-olivier.info/. Enter S176 in the search box to learn more about \"Body Mass Index: Care Instructions. \"  Current as of: October 13, 2016  Content Version: 11.4  © 8325-9465 [employer]. Care instructions adapted under license by [employer] (which disclaims liability or warranty for this information).  If you have questions about a medical condition or this instruction, always ask your healthcare professional. [place] [address] any warranty or liability for your use of this information.

## 2018-08-13 ENCOUNTER — HOSPITAL ENCOUNTER (OUTPATIENT)
Dept: VASCULAR SURGERY | Age: 58
Discharge: HOME | End: 2018-08-13
Attending: INTERNAL MEDICINE
Payer: MEDICARE

## 2018-08-13 DIAGNOSIS — R09.89 BILATERAL CAROTID BRUITS: ICD-10-CM

## 2018-08-13 PROCEDURE — 93880 EXTRACRANIAL BILAT STUDY: CPT

## 2018-08-14 LAB
LEFT CCA DIST DIAS: 20.84 CM/S
LEFT CCA DIST SYS: 87.08 CM/S
LEFT CCA MID DIAS: 18.27 CM/S
LEFT CCA MID SYS: 84.7 CM/S
LEFT CCA PROX DIAS: 25.42 CM/S
LEFT CCA PROX SYS: 99.9 CM/S
LEFT ECA DIAS: 15.99 CM/S
LEFT ECA SYS: 121 CM/S
LEFT ICA DIST DIAS: 35.38 CM/S
LEFT ICA DIST SYS: 100 CM/S
LEFT ICA MID DIAS: 27.3 CM/S
LEFT ICA MID SYS: 104.85 CM/S
LEFT ICA PROX DIAS: 30.53 CM/S
LEFT ICA PROX SYS: 96.77 CM/S
LEFT ICA/CCA SYS: 1.2
LEFT SUBCLAVIAN SYS: 167 CM/S
LEFT VERTEBRAL DIAS: 20.83 CM/S
LEFT VERTEBRAL SYS: 64.43 CM/S
RIGHT CCA DIST DIAS: 25.69 CM/S
RIGHT CCA DIST SYS: 101.61 CM/S
RIGHT CCA MID DIAS: 20.84 CM/S
RIGHT CCA MID SYS: 79 CM/S
RIGHT CCA PROX DIAS: 20.67 CM/S
RIGHT CCA PROX SYS: 106.61 CM/S
RIGHT ECA DIAS: 9.07 CM/S
RIGHT ECA SYS: 134.7 CM/S
RIGHT ICA DIST DIAS: 31.2 CM/S
RIGHT ICA DIST SYS: 114.29 CM/S
RIGHT ICA MID DIAS: 34.88 CM/S
RIGHT ICA MID SYS: 125.3 CM/S
RIGHT ICA PROX DIAS: 25.43 CM/S
RIGHT ICA PROX SYS: 94.42 CM/S
RIGHT ICA/CCA SYS: 1.2
RIGHT SUBCLAVIAN SYS: 166 CM/S
RIGHT VERTEBRAL DIAS: 10.18 CM/S
RIGHT VERTEBRAL SYS: 52.33 CM/S

## 2018-08-25 ENCOUNTER — APPOINTMENT (OUTPATIENT)
Dept: GENERAL RADIOLOGY | Age: 58
End: 2018-08-25
Attending: EMERGENCY MEDICINE
Payer: MEDICARE

## 2018-08-25 ENCOUNTER — HOSPITAL ENCOUNTER (EMERGENCY)
Age: 58
Discharge: HOME | End: 2018-08-25
Attending: EMERGENCY MEDICINE
Payer: MEDICARE

## 2018-08-25 VITALS
TEMPERATURE: 97.5 F | HEIGHT: 63 IN | RESPIRATION RATE: 24 BRPM | WEIGHT: 253 LBS | SYSTOLIC BLOOD PRESSURE: 138 MMHG | HEART RATE: 92 BPM | OXYGEN SATURATION: 96 % | DIASTOLIC BLOOD PRESSURE: 75 MMHG | BODY MASS INDEX: 44.83 KG/M2

## 2018-08-25 DIAGNOSIS — J44.1 ACUTE EXACERBATION OF CHRONIC OBSTRUCTIVE PULMONARY DISEASE (COPD) (HCC): Primary | ICD-10-CM

## 2018-08-25 LAB
ALBUMIN SERPL-MCNC: 3.7 G/DL (ref 3.4–5)
ALBUMIN/GLOB SERPL: 1 {RATIO} (ref 0.8–1.7)
ALP SERPL-CCNC: 80 U/L (ref 45–117)
ALT SERPL-CCNC: 51 U/L (ref 13–56)
ANION GAP SERPL CALC-SCNC: 5 MMOL/L (ref 3–18)
AST SERPL-CCNC: 28 U/L (ref 15–37)
BASOPHILS # BLD: 0 K/UL (ref 0–0.1)
BASOPHILS NFR BLD: 0 % (ref 0–2)
BILIRUB SERPL-MCNC: 0.7 MG/DL (ref 0.2–1)
BUN SERPL-MCNC: 9 MG/DL (ref 7–18)
BUN/CREAT SERPL: 10 (ref 12–20)
CALCIUM SERPL-MCNC: 8.9 MG/DL (ref 8.5–10.1)
CHLORIDE SERPL-SCNC: 103 MMOL/L (ref 100–108)
CO2 SERPL-SCNC: 29 MMOL/L (ref 21–32)
CREAT SERPL-MCNC: 0.94 MG/DL (ref 0.6–1.3)
DIFFERENTIAL METHOD BLD: ABNORMAL
EOSINOPHIL # BLD: 0 K/UL (ref 0–0.4)
EOSINOPHIL NFR BLD: 1 % (ref 0–5)
ERYTHROCYTE [DISTWIDTH] IN BLOOD BY AUTOMATED COUNT: 13.8 % (ref 11.6–14.5)
GLOBULIN SER CALC-MCNC: 3.8 G/DL (ref 2–4)
GLUCOSE SERPL-MCNC: 233 MG/DL (ref 74–99)
HCT VFR BLD AUTO: 37.5 % (ref 35–45)
HGB BLD-MCNC: 13.5 G/DL (ref 12–16)
LYMPHOCYTES # BLD: 1.7 K/UL (ref 0.9–3.6)
LYMPHOCYTES NFR BLD: 30 % (ref 21–52)
MCH RBC QN AUTO: 30.5 PG (ref 24–34)
MCHC RBC AUTO-ENTMCNC: 36 G/DL (ref 31–37)
MCV RBC AUTO: 84.7 FL (ref 74–97)
MONOCYTES # BLD: 0.2 K/UL (ref 0.05–1.2)
MONOCYTES NFR BLD: 4 % (ref 3–10)
NEUTS SEG # BLD: 3.7 K/UL (ref 1.8–8)
NEUTS SEG NFR BLD: 65 % (ref 40–73)
PLATELET # BLD AUTO: 246 K/UL (ref 135–420)
PMV BLD AUTO: 8.8 FL (ref 9.2–11.8)
POTASSIUM SERPL-SCNC: 3.7 MMOL/L (ref 3.5–5.5)
PROT SERPL-MCNC: 7.5 G/DL (ref 6.4–8.2)
RBC # BLD AUTO: 4.43 M/UL (ref 4.2–5.3)
SODIUM SERPL-SCNC: 137 MMOL/L (ref 136–145)
WBC # BLD AUTO: 5.7 K/UL (ref 4.6–13.2)

## 2018-08-25 PROCEDURE — 80053 COMPREHEN METABOLIC PANEL: CPT | Performed by: EMERGENCY MEDICINE

## 2018-08-25 PROCEDURE — 74011636637 HC RX REV CODE- 636/637: Performed by: EMERGENCY MEDICINE

## 2018-08-25 PROCEDURE — 99285 EMERGENCY DEPT VISIT HI MDM: CPT

## 2018-08-25 PROCEDURE — 94640 AIRWAY INHALATION TREATMENT: CPT

## 2018-08-25 PROCEDURE — 93005 ELECTROCARDIOGRAM TRACING: CPT

## 2018-08-25 PROCEDURE — 71045 X-RAY EXAM CHEST 1 VIEW: CPT

## 2018-08-25 PROCEDURE — 85025 COMPLETE CBC W/AUTO DIFF WBC: CPT | Performed by: EMERGENCY MEDICINE

## 2018-08-25 PROCEDURE — A9270 NON-COVERED ITEM OR SERVICE: HCPCS | Performed by: EMERGENCY MEDICINE

## 2018-08-25 PROCEDURE — 77030029684 HC NEB SM VOL KT MONA -A

## 2018-08-25 RX ORDER — PREDNISONE 10 MG/1
TABLET ORAL
Qty: 48 TAB | Refills: 0 | Status: SHIPPED | OUTPATIENT
Start: 2018-08-25 | End: 2018-10-30 | Stop reason: ALTCHOICE

## 2018-08-25 RX ORDER — ALBUTEROL SULFATE 0.83 MG/ML
2.5 SOLUTION RESPIRATORY (INHALATION)
Status: DISCONTINUED | OUTPATIENT
Start: 2018-08-25 | End: 2018-08-25 | Stop reason: HOSPADM

## 2018-08-25 RX ORDER — PREDNISONE 20 MG/1
60 TABLET ORAL
Status: COMPLETED | OUTPATIENT
Start: 2018-08-25 | End: 2018-08-25

## 2018-08-25 RX ADMIN — PREDNISONE 60 MG: 20 TABLET ORAL at 17:39

## 2018-08-25 NOTE — DISCHARGE INSTRUCTIONS
COPD Exacerbation Plan: Care Instructions  Your Care Instructions    If you have chronic obstructive pulmonary disease (COPD), your usual shortness of breath could suddenly get worse. You may start coughing more and have more mucus. This flare-up is called a COPD exacerbation (say \"[id number]\"). A lung infection or air pollution could set off an exacerbation. Sometimes it can happen after a quick change in temperature or being around chemicals. Work with your doctor to make a plan for dealing with an exacerbation. You can better manage it if you plan ahead. Follow-up care is a key part of your treatment and safety. Be sure to make and go to all appointments, and call your doctor if you are having problems. It's also a good idea to know your test results and keep a list of the medicines you take. How can you care for yourself at home? During an exacerbation  · Do not panic if you start to have one. Quick treatment at home may help you prevent serious breathing problems. If you have a COPD exacerbation plan that you developed with your doctor, follow it. · Take your medicines exactly as your doctor tells you. ¨ Use your inhaler as directed by your doctor. If your symptoms do not get better after you use your medicine, have someone take you to the emergency room. Call an ambulance if necessary. ¨ With inhaled medicines, a spacer or a nebulizer may help you get more medicine to your lungs. Ask your doctor or pharmacist how to use them properly. Practice using the spacer in front of a mirror before you have an exacerbation. This may help you get the medicine into your lungs quickly. ¨ If your doctor has given you steroid pills, take them as directed. ¨ Your doctor may have given you a prescription for antibiotics, which you can fill if you need it. ¨ Talk to your doctor if you have any problems with your medicine.  And call your doctor if you have to use your antibiotic or steroid pills.  Preventing an exacerbation  · Do not smoke. This is the most important step you can take to prevent more damage to your lungs and prevent problems. If you already smoke, it is never too late to stop. If you need help quitting, talk to your doctor about stop-smoking programs and medicines. These can increase your chances of quitting for good. · Take your daily medicines as prescribed. · Avoid colds and flu. ¨ Get a pneumococcal vaccine. ¨ Get a flu vaccine each year, as soon as it is available. Ask those you live or work with to do the same, so they will not get the flu and infect you. ¨ Try to stay away from people with colds or the flu. ¨ Wash your hands often. · Avoid secondhand smoke; air pollution; cold, dry air; hot, humid air; and high altitudes. Stay at home with your windows closed when air pollution is bad. · Learn breathing techniques for COPD, such as breathing through pursed lips. These techniques can help you breathe easier during an exacerbation. When should you call for help? Call 911 anytime you think you may need emergency care. For example, call if:    · You have severe trouble breathing.     · You have severe chest pain.    Call your doctor now or seek immediate medical care if:    · You have new or worse shortness of breath.     · You develop new chest pain.     · You are coughing more deeply or more often, especially if you notice more mucus or a change in the color of your mucus.     · You cough up blood.     · You have new or increased swelling in your legs or belly.     · You have a fever.    Watch closely for changes in your health, and be sure to contact your doctor if:    · You need to use your antibiotic or steroid pills.     · Your symptoms are getting worse. Where can you learn more? Go to http://etelvina-olivier.info/. Enter V256 in the search box to learn more about \"COPD Exacerbation Plan: Care Instructions. \"  Current as of: December 6, 2017  Content Version: 11.7  © 0933-7461 [employer]. Care instructions adapted under license by [employer] (which disclaims liability or warranty for this information). If you have questions about a medical condition or this instruction, always ask your healthcare professional. [address] any warranty or liability for your use of this information.

## 2018-08-25 NOTE — ED TRIAGE NOTES
Shortness of breath x 2 days, worse this morning. Called to [phone]. [facility] gave patient Duo neb, then albuterol nebulizer, then 125 mgs.  solumedrol prior to arrival.

## 2018-08-25 NOTE — ED PROVIDER NOTES
EMERGENCY DEPARTMENT HISTORY AND PHYSICAL EXAM    3:36 PM      Date: 8/25/2018  Patient Name: Nelida Danielle    History of Presenting Illness     No chief complaint on file. History Provided By: Patient    Additional History (Context): Nelida Danielle is a 60 y.o. female with a past medical history of HTN, COPD, DM, hyperlipidemia, asthma, SHERRIE on CPAP who presents with c/o shortness of breath onset 2 days ago. She describes her shortness of breath as constant, worsening, and \"feels like COPD. \" Patient notes that she has sick contacts at home. She was given 125 Solumedrol, Albuterol, and a Nebulizer. Patient denies other pain, and any other associated symptoms or complaints. PCP: Zeus Juarez MD    Chief Complaint: Shortness of breath  Duration: 2 Days  Timing:  Constant and Worsening  Location: Chest  Severity: Moderate  Modifying Factors: Improved slightly by Solumedrol, Albuterol, and Nebulizer  Associated Symptoms: Patient denies other pain, and any other associated symptoms or complaints      Current Facility-Administered Medications   Medication Dose Route Frequency Provider Last Rate Last Dose    albuterol (PROVENTIL VENTOLIN) nebulizer solution 2.5 mg  2.5 mg Nebulization NOW Yulia Saini MD        predniSONE (DELTASONE) tablet 60 mg  60 mg Oral NOW Yulia Saini MD         Current Outpatient Prescriptions   Medication Sig Dispense Refill    insulin glargine (LANTUS,BASAGLAR) 100 unit/mL (3 mL) inpn 30 Units by SubCUTAneous route nightly. Indications: type 2 diabetes mellitus 28 Units 0    NOVOLOG FLEXPEN U-100 INSULIN 100 unit/mL inpn Continue home Sliding scale insulin as prior to admission 1 Pen 0    fluticasone-salmeterol (ADVAIR DISKUS) 100-50 mcg/dose diskus inhaler Take 1 Puff by inhalation every twelve (12) hours. 1 Inhaler 0    umeclidinium (INCRUSE ELLIPTA) 62.5 mcg/actuation inhaler Take 1 Puff by inhalation daily.  Indications: BRONCHOSPASM PREVENTION WITH COPD 1 Inhaler 0    ipratropium (ATROVENT) 0.02 % soln 2.5 mL by Nebulization route four (4) times daily as needed. Indications: BRONCHOSPASM PREVENTION WITH COPD 30 Vial 0    albuterol (PROVENTIL VENTOLIN) 2.5 mg /3 mL (0.083 %) nebulizer solution 3 mL by Nebulization route every four (4) hours as needed for Wheezing. Indications: BRONCHOSPASM PREVENTION, Chronic Obstructive Pulmonary Disease 30 Each 0    omeprazole (PRILOSEC) 40 mg capsule Take 40 mg by mouth daily. Indications: gastroesophageal reflux disease      lisinopril (PRINIVIL, ZESTRIL) 40 mg tablet Take 20 mg by mouth two (2) times a day. Indications: hypertension      simethicone (MYLICON) 80 mg chewable tablet Take 80 mg by mouth every six (6) hours as needed for Flatulence.  cpap machine kit by Does Not Apply route nightly.  OXYGEN-AIR DELIVERY SYSTEMS 2 L by Nasal route continuous. [facility]      aspirin delayed-release 81 mg tablet Take 81 mg by mouth daily.  famotidine (PEPCID) 20 mg tablet Take 20 mg by mouth two (2) times daily as needed.  albuterol (PROVENTIL HFA, VENTOLIN HFA, PROAIR HFA) 90 mcg/actuation inhaler Take 2 Puffs by inhalation every four (4) hours as needed for Wheezing. For the next 2 days after hospital discharge, use your inhaler 4 times a day. 1 Inhaler 0    fluticasone (FLONASE) 50 mcg/actuation nasal spray 2 Sprays by Both Nostrils route daily.  montelukast (SINGULAIR) 10 mg tablet Take 10 mg by mouth nightly.          Past History     Past Medical History:  Past Medical History:   Diagnosis Date    Asthma     COPD     Cystocele, midline     Diabetes mellitus ([facility] [address]     GERD (gastroesophageal reflux disease)     Hidradenitis suppurativa     Hyperlipidemia     Hypertension     SHERRIE on CPAP     CPAP    Stress incontinence        Past Surgical History:  Past Surgical History:   Procedure Laterality Date    BREAST SURGERY PROCEDURE UNLISTED      Right breast benign tumor removal  HX APPENDECTOMY      HX CHOLECYSTECTOMY      HX DILATION AND CURETTAGE      Dysfunctional uterine bleeding, thought 2/2 fibroids    HX TUBAL LIGATION         Family History:  Family History   Problem Relation Age of Onset    Hypertension Mother     Stroke Mother     Breast Cancer Mother      Bilateral mastectomies    Cancer Mother      ovarian and breast    Heart Failure Mother     Heart Attack Father      2011    Heart Surgery Father      CABG    Heart Failure Father     COPD Sister      Heavy smoker    Cancer Sister      ovarian    Heart Failure Sister     Lung Disease Sister     Asthma Child     Cancer Maternal Aunt      pancreatic    Cancer Maternal Grandfather      stomach       Social History:  Social History   Substance Use Topics    Smoking status: Former Smoker     Packs/day: 1.00     Years: 30.00     Types: Cigarettes     Start date: 5/6/1966     Quit date: 9/6/2006    Smokeless tobacco: Never Used      Comment: 1-1.5 packs per day    Alcohol use No       Allergies: Allergies   Allergen Reactions    Ancef [Cefazolin] Hives    Contrast Agent [Iodine] Anaphylaxis, Shortness of Breath and Swelling     Needs pre-medication for IV contrast with Benadryl, Solu-Medrol    Fish Containing Products Anaphylaxis     Pt states she had a severe allergic reaction at 7 y/o.  Metformin Other (comments)     Abdominal pain, diarrhea.  Codeine Other (comments)     Altered mental status         Review of Systems     Review of Systems   Constitutional: Negative for fever. Respiratory: Positive for shortness of breath. Cardiovascular: Negative for chest pain. All other systems reviewed and are negative. Physical Exam   There were no vitals taken for this visit. Physical Exam   Constitutional: She is oriented to person, place, and time. She appears well-developed. HENT:   Head: Normocephalic and atraumatic.    Eyes: EOM are normal. Pupils are equal, round, and reactive to light.   Neck: Normal range of motion. Neck supple. Cardiovascular: Normal rate, regular rhythm and normal heart sounds. Exam reveals no friction rub. No murmur heard. Pulmonary/Chest: Effort normal and breath sounds normal. No respiratory distress. She has no wheezes. Abdominal: Soft. She exhibits no distension. There is no tenderness. There is no rebound and no guarding. Musculoskeletal: Normal range of motion. Neurological: She is alert and oriented to person, place, and time. Skin: Skin is warm and dry. Psychiatric: She has a normal mood and affect. Her behavior is normal. Thought content normal.         Diagnostic Study Results     cxr napd   ekg nsr ; nl axis. Nl int. No juliana/d no hypertrophy     Medical Decision Making     1. Acute copd; cw prior sx per pt. No fever no chills  2L home o2.     5:46 PM at baseline and would like ot go home     Diagnosis     No diagnosis found. _______________________________    Attestations:  Scribe Attestation     Mary Cruz acting as a scribe for and in the presence of Johnathan Smith MD      August 25, 2018 at 3:36 PM       Provider Attestation:      I personally performed the services described in the documentation, reviewed the documentation, as recorded by the scribe in my presence, and it accurately and completely records my words and actions.  August 25, 2018 at 3:36 PM - Johnathan Smith MD    _______________________________

## 2018-08-26 LAB
ATRIAL RATE: 91 BPM
CALCULATED P AXIS, ECG09: 49 DEGREES
CALCULATED R AXIS, ECG10: 25 DEGREES
CALCULATED T AXIS, ECG11: 34 DEGREES
DIAGNOSIS, 93000: NORMAL
P-R INTERVAL, ECG05: 148 MS
Q-T INTERVAL, ECG07: 376 MS
QRS DURATION, ECG06: 88 MS
QTC CALCULATION (BEZET), ECG08: 462 MS
VENTRICULAR RATE, ECG03: 91 BPM

## 2018-10-02 ENCOUNTER — CLINICAL SUPPORT (OUTPATIENT)
Dept: PULMONOLOGY | Age: 58
End: 2018-10-02

## 2018-10-02 VITALS
OXYGEN SATURATION: 96 % | SYSTOLIC BLOOD PRESSURE: 160 MMHG | HEIGHT: 63 IN | TEMPERATURE: 97.9 F | WEIGHT: 252 LBS | RESPIRATION RATE: 25 BRPM | BODY MASS INDEX: 44.65 KG/M2 | DIASTOLIC BLOOD PRESSURE: 80 MMHG | HEART RATE: 89 BPM

## 2018-10-02 DIAGNOSIS — J44.9 CHRONIC OBSTRUCTIVE PULMONARY DISEASE, UNSPECIFIED COPD TYPE (HCC): Primary | ICD-10-CM

## 2018-10-07 ENCOUNTER — APPOINTMENT (OUTPATIENT)
Dept: GENERAL RADIOLOGY | Age: 58
DRG: 191 | End: 2018-10-07
Attending: STUDENT IN AN ORGANIZED HEALTH CARE EDUCATION/TRAINING PROGRAM
Payer: MEDICARE

## 2018-10-07 ENCOUNTER — HOSPITAL ENCOUNTER (INPATIENT)
Age: 58
LOS: 4 days | Discharge: HOME | DRG: 191 | End: 2018-10-11
Attending: EMERGENCY MEDICINE | Admitting: FAMILY MEDICINE
Payer: MEDICARE

## 2018-10-07 DIAGNOSIS — J44.1 ACUTE EXACERBATION OF CHRONIC OBSTRUCTIVE PULMONARY DISEASE (COPD) (HCC): Primary | ICD-10-CM

## 2018-10-07 LAB
ANION GAP SERPL CALC-SCNC: 7 MMOL/L (ref 3–18)
ARTERIAL PATENCY WRIST A: YES
BASE EXCESS BLD CALC-SCNC: 6 MMOL/L
BASOPHILS # BLD: 0 K/UL (ref 0–0.1)
BASOPHILS NFR BLD: 0 % (ref 0–2)
BDY SITE: ABNORMAL
BNP SERPL-MCNC: 46 PG/ML (ref 0–900)
BODY TEMPERATURE: 98.6
BUN SERPL-MCNC: 10 MG/DL (ref 7–18)
BUN/CREAT SERPL: 11 (ref 12–20)
CALCIUM SERPL-MCNC: 8.9 MG/DL (ref 8.5–10.1)
CHLORIDE SERPL-SCNC: 104 MMOL/L (ref 100–108)
CK MB CFR SERPL CALC: 1.2 % (ref 0–4)
CK MB SERPL-MCNC: 1.3 NG/ML (ref 5–25)
CK SERPL-CCNC: 106 U/L (ref 26–192)
CO2 SERPL-SCNC: 28 MMOL/L (ref 21–32)
CREAT SERPL-MCNC: 0.87 MG/DL (ref 0.6–1.3)
DIFFERENTIAL METHOD BLD: ABNORMAL
EOSINOPHIL # BLD: 0.2 K/UL (ref 0–0.4)
EOSINOPHIL NFR BLD: 3 % (ref 0–5)
ERYTHROCYTE [DISTWIDTH] IN BLOOD BY AUTOMATED COUNT: 13.5 % (ref 11.6–14.5)
GAS FLOW.O2 O2 DELIVERY SYS: ABNORMAL L/MIN
GAS FLOW.O2 SETTING OXYMISER: 3 L/M
GLUCOSE BLD STRIP.AUTO-MCNC: 368 MG/DL (ref 70–110)
GLUCOSE SERPL-MCNC: 193 MG/DL (ref 74–99)
HCO3 BLD-SCNC: 29.7 MMOL/L (ref 22–26)
HCT VFR BLD AUTO: 37.2 % (ref 35–45)
HGB BLD-MCNC: 13.3 G/DL (ref 12–16)
LYMPHOCYTES # BLD: 1.9 K/UL (ref 0.9–3.6)
LYMPHOCYTES NFR BLD: 33 % (ref 21–52)
MCH RBC QN AUTO: 30.6 PG (ref 24–34)
MCHC RBC AUTO-ENTMCNC: 35.8 G/DL (ref 31–37)
MCV RBC AUTO: 85.7 FL (ref 74–97)
MONOCYTES # BLD: 0.3 K/UL (ref 0.05–1.2)
MONOCYTES NFR BLD: 5 % (ref 3–10)
NEUTS SEG # BLD: 3.4 K/UL (ref 1.8–8)
NEUTS SEG NFR BLD: 59 % (ref 40–73)
O2/TOTAL GAS SETTING VFR VENT: 32 %
PCO2 BLD: 40.4 MMHG (ref 35–45)
PH BLD: 7.47 [PH] (ref 7.35–7.45)
PLATELET # BLD AUTO: 250 K/UL (ref 135–420)
PMV BLD AUTO: 8.8 FL (ref 9.2–11.8)
PO2 BLD: 149 MMHG (ref 80–100)
POTASSIUM SERPL-SCNC: 3.8 MMOL/L (ref 3.5–5.5)
RBC # BLD AUTO: 4.34 M/UL (ref 4.2–5.3)
SAO2 % BLD: 99 % (ref 92–97)
SERVICE CMNT-IMP: ABNORMAL
SODIUM SERPL-SCNC: 139 MMOL/L (ref 136–145)
SPECIMEN TYPE: ABNORMAL
TOTAL RESP. RATE, ITRR: 23
TROPONIN I SERPL-MCNC: <0.02 NG/ML (ref 0–0.04)
WBC # BLD AUTO: 5.8 K/UL (ref 4.6–13.2)

## 2018-10-07 PROCEDURE — 82962 GLUCOSE BLOOD TEST: CPT

## 2018-10-07 PROCEDURE — 77030029684 HC NEB SM VOL KT MONA -A

## 2018-10-07 PROCEDURE — 83880 ASSAY OF NATRIURETIC PEPTIDE: CPT | Performed by: EMERGENCY MEDICINE

## 2018-10-07 PROCEDURE — 93005 ELECTROCARDIOGRAM TRACING: CPT

## 2018-10-07 PROCEDURE — 80048 BASIC METABOLIC PNL TOTAL CA: CPT | Performed by: EMERGENCY MEDICINE

## 2018-10-07 PROCEDURE — 94640 AIRWAY INHALATION TREATMENT: CPT

## 2018-10-07 PROCEDURE — 74011000250 HC RX REV CODE- 250: Performed by: STUDENT IN AN ORGANIZED HEALTH CARE EDUCATION/TRAINING PROGRAM

## 2018-10-07 PROCEDURE — 96365 THER/PROPH/DIAG IV INF INIT: CPT

## 2018-10-07 PROCEDURE — 85025 COMPLETE CBC W/AUTO DIFF WBC: CPT | Performed by: EMERGENCY MEDICINE

## 2018-10-07 PROCEDURE — 36600 WITHDRAWAL OF ARTERIAL BLOOD: CPT

## 2018-10-07 PROCEDURE — 74011000250 HC RX REV CODE- 250: Performed by: EMERGENCY MEDICINE

## 2018-10-07 PROCEDURE — 96361 HYDRATE IV INFUSION ADD-ON: CPT

## 2018-10-07 PROCEDURE — 82550 ASSAY OF CK (CPK): CPT | Performed by: EMERGENCY MEDICINE

## 2018-10-07 PROCEDURE — 99285 EMERGENCY DEPT VISIT HI MDM: CPT

## 2018-10-07 PROCEDURE — 71046 X-RAY EXAM CHEST 2 VIEWS: CPT

## 2018-10-07 PROCEDURE — 74011250636 HC RX REV CODE- 250/636: Performed by: EMERGENCY MEDICINE

## 2018-10-07 PROCEDURE — 65660000000 HC RM CCU STEPDOWN

## 2018-10-07 PROCEDURE — 82803 BLOOD GASES ANY COMBINATION: CPT

## 2018-10-07 PROCEDURE — 96375 TX/PRO/DX INJ NEW DRUG ADDON: CPT

## 2018-10-07 RX ORDER — LISINOPRIL 40 MG/1
40 TABLET ORAL DAILY
Status: DISCONTINUED | OUTPATIENT
Start: 2018-10-08 | End: 2018-10-09

## 2018-10-07 RX ORDER — PANTOPRAZOLE SODIUM 40 MG/1
40 TABLET, DELAYED RELEASE ORAL DAILY
Status: DISCONTINUED | OUTPATIENT
Start: 2018-10-08 | End: 2018-10-11 | Stop reason: HOSPADM

## 2018-10-07 RX ORDER — MONTELUKAST SODIUM 10 MG/1
10 TABLET ORAL
Status: DISCONTINUED | OUTPATIENT
Start: 2018-10-08 | End: 2018-10-11 | Stop reason: HOSPADM

## 2018-10-07 RX ORDER — ALBUTEROL SULFATE 0.83 MG/ML
SOLUTION RESPIRATORY (INHALATION)
Status: DISPENSED
Start: 2018-10-07 | End: 2018-10-08

## 2018-10-07 RX ORDER — INSULIN LISPRO 100 [IU]/ML
INJECTION, SOLUTION INTRAVENOUS; SUBCUTANEOUS
Status: DISCONTINUED | OUTPATIENT
Start: 2018-10-08 | End: 2018-10-11 | Stop reason: HOSPADM

## 2018-10-07 RX ORDER — MAGNESIUM SULFATE HEPTAHYDRATE 40 MG/ML
2 INJECTION, SOLUTION INTRAVENOUS ONCE
Status: COMPLETED | OUTPATIENT
Start: 2018-10-07 | End: 2018-10-07

## 2018-10-07 RX ORDER — SIMETHICONE 80 MG
80 TABLET,CHEWABLE ORAL
Status: DISCONTINUED | OUTPATIENT
Start: 2018-10-07 | End: 2018-10-11 | Stop reason: HOSPADM

## 2018-10-07 RX ORDER — INSULIN GLARGINE 100 [IU]/ML
30 INJECTION, SOLUTION SUBCUTANEOUS
Status: DISCONTINUED | OUTPATIENT
Start: 2018-10-08 | End: 2018-10-11 | Stop reason: HOSPADM

## 2018-10-07 RX ORDER — LEVOFLOXACIN 5 MG/ML
750 INJECTION, SOLUTION INTRAVENOUS EVERY 24 HOURS
Status: DISCONTINUED | OUTPATIENT
Start: 2018-10-07 | End: 2018-10-10

## 2018-10-07 RX ORDER — ACETAMINOPHEN 325 MG/1
650 TABLET ORAL
Status: DISCONTINUED | OUTPATIENT
Start: 2018-10-07 | End: 2018-10-11 | Stop reason: HOSPADM

## 2018-10-07 RX ORDER — IPRATROPIUM BROMIDE AND ALBUTEROL SULFATE 2.5; .5 MG/3ML; MG/3ML
3 SOLUTION RESPIRATORY (INHALATION)
Status: COMPLETED | OUTPATIENT
Start: 2018-10-07 | End: 2018-10-07

## 2018-10-07 RX ORDER — FLUTICASONE PROPIONATE 50 MCG
2 SPRAY, SUSPENSION (ML) NASAL DAILY
Status: DISCONTINUED | OUTPATIENT
Start: 2018-10-08 | End: 2018-10-11 | Stop reason: HOSPADM

## 2018-10-07 RX ORDER — IPRATROPIUM BROMIDE AND ALBUTEROL SULFATE 2.5; .5 MG/3ML; MG/3ML
3 SOLUTION RESPIRATORY (INHALATION) ONCE
Status: COMPLETED | OUTPATIENT
Start: 2018-10-07 | End: 2018-10-07

## 2018-10-07 RX ORDER — MAGNESIUM SULFATE 100 %
4 CRYSTALS MISCELLANEOUS AS NEEDED
Status: DISCONTINUED | OUTPATIENT
Start: 2018-10-07 | End: 2018-10-11 | Stop reason: HOSPADM

## 2018-10-07 RX ORDER — ENOXAPARIN SODIUM 100 MG/ML
40 INJECTION SUBCUTANEOUS EVERY 24 HOURS
Status: DISCONTINUED | OUTPATIENT
Start: 2018-10-08 | End: 2018-10-11 | Stop reason: HOSPADM

## 2018-10-07 RX ORDER — FAMOTIDINE 20 MG/1
20 TABLET, FILM COATED ORAL 2 TIMES DAILY
Status: DISCONTINUED | OUTPATIENT
Start: 2018-10-08 | End: 2018-10-11 | Stop reason: HOSPADM

## 2018-10-07 RX ORDER — ASPIRIN 81 MG/1
81 TABLET ORAL DAILY
Status: DISCONTINUED | OUTPATIENT
Start: 2018-10-08 | End: 2018-10-11 | Stop reason: HOSPADM

## 2018-10-07 RX ORDER — IPRATROPIUM BROMIDE AND ALBUTEROL SULFATE 2.5; .5 MG/3ML; MG/3ML
3 SOLUTION RESPIRATORY (INHALATION)
Status: DISCONTINUED | OUTPATIENT
Start: 2018-10-08 | End: 2018-10-08

## 2018-10-07 RX ORDER — MONTELUKAST SODIUM 10 MG/1
10 TABLET ORAL
Status: DISCONTINUED | OUTPATIENT
Start: 2018-10-08 | End: 2018-10-07 | Stop reason: DRUGHIGH

## 2018-10-07 RX ORDER — ALBUTEROL SULFATE 0.83 MG/ML
5 SOLUTION RESPIRATORY (INHALATION)
Status: COMPLETED | OUTPATIENT
Start: 2018-10-07 | End: 2018-10-07

## 2018-10-07 RX ORDER — DEXTROSE 50 % IN WATER (D50W) INTRAVENOUS SYRINGE
25-50 AS NEEDED
Status: DISCONTINUED | OUTPATIENT
Start: 2018-10-07 | End: 2018-10-11 | Stop reason: HOSPADM

## 2018-10-07 RX ADMIN — METHYLPREDNISOLONE SODIUM SUCCINATE 125 MG: 125 INJECTION, POWDER, FOR SOLUTION INTRAMUSCULAR; INTRAVENOUS at 17:14

## 2018-10-07 RX ADMIN — IPRATROPIUM BROMIDE AND ALBUTEROL SULFATE 3 ML: .5; 3 SOLUTION RESPIRATORY (INHALATION) at 20:19

## 2018-10-07 RX ADMIN — IPRATROPIUM BROMIDE AND ALBUTEROL SULFATE 3 ML: .5; 3 SOLUTION RESPIRATORY (INHALATION) at 17:13

## 2018-10-07 RX ADMIN — SODIUM CHLORIDE 500 ML: 900 INJECTION, SOLUTION INTRAVENOUS at 17:14

## 2018-10-07 RX ADMIN — ALBUTEROL SULFATE 5 MG: 2.5 SOLUTION RESPIRATORY (INHALATION) at 17:47

## 2018-10-07 RX ADMIN — MAGNESIUM SULFATE IN WATER 2 G: 40 INJECTION, SOLUTION INTRAVENOUS at 17:13

## 2018-10-07 NOTE — IP AVS SNAPSHOT
[id number] [address] Patient: Alessandra Kam MRN: [id number] CNO:9/28/2624 About your hospitalization You were admitted on:  October 7, 2018 You last received care in the:  [facility] [address] [facility] You were discharged on:  October 11, 2018 Why you were hospitalized Your primary diagnosis was:  Acute Exacerbation Of Chronic Obstructive Pulmonary Disease (Copd) ([facility]) Follow-up Information Follow up With Details Comments Contact Info Deni Patten MD On 10/16/2018 @ 2:20 [address] [facility] [address] [facility] [address] 
[phone] Discharge Orders None A check cole indicates which time of day the medication should be taken. My Medications START taking these medications Instructions Each Dose to Equal  
 Morning Noon Evening Bedtime  
 azithromycin 250 mg tablet Commonly known as:  Georgia Bender Your last dose was: Your next dose is: Take 1 Tab by mouth daily for 84 days. 250 mg CHANGE how you take these medications Instructions Each Dose to Equal  
 Morning Noon Evening Bedtime * predniSONE 10 mg dose pack Commonly known as:  STERAPRED DS What changed:  Another medication with the same name was added. Make sure you understand how and when to take each. Your last dose was: Your next dose is: As per pack * predniSONE 20 mg tablet Commonly known as:  Horacio Jj What changed: You were already taking a medication with the same name, and this prescription was added. Make sure you understand how and when to take each. Your last dose was: Your next dose is: Take 2 tablets daily with breakfast for 4 days (last 40 mg dose on Oct 15), then 1 tablet daily with breakfast for 10 additional days. * Notice: This list has 2 medication(s) that are the same as other medications prescribed for you. Read the directions carefully, and ask your doctor or other care provider to review them with you. CONTINUE taking these medications Instructions Each Dose to Equal  
 Morning Noon Evening Bedtime * albuterol 90 mcg/actuation inhaler Commonly known as:  PROVENTIL HFA, VENTOLIN HFA, PROAIR HFA Your last dose was: Your next dose is: Take 2 Puffs by inhalation every four (4) hours as needed for Wheezing. For the next 2 days after hospital discharge, use your inhaler 4 times a day. 2 Puff * albuterol 2.5 mg /3 mL (0.083 %) nebulizer solution Commonly known as:  PROVENTIL VENTOLIN Your last dose was: Your next dose is:    
   
   
 3 mL by Nebulization route every four (4) hours as needed for Wheezing. Indications: BRONCHOSPASM PREVENTION, Chronic Obstructive Pulmonary Disease 2.5 mg  
    
   
   
   
  
 * albuterol sulfate 90 mcg/actuation Aepb Your last dose was: Your next dose is: Take 1 Puff by inhalation every four (4) hours. 1 Puff  
    
   
   
   
  
 aspirin delayed-release 81 mg tablet Your last dose was: Your next dose is: Take 81 mg by mouth daily. 81 mg  
    
   
   
   
  
 cpap machine kit Your last dose was: Your next dose is:    
   
   
 by Does Not Apply route nightly. famotidine 20 mg tablet Commonly known as:  PEPCID Your last dose was: Your next dose is: Take 20 mg by mouth two (2) times daily as needed. 20 mg  
    
   
   
   
  
 FLONASE 50 mcg/actuation nasal spray Generic drug:  fluticasone Your last dose was: Your next dose is: 2 Sprays by Both Nostrils route daily. 2 [place] fluticasone-salmeterol 100-50 mcg/dose diskus inhaler Commonly known as:  ADVAIR DISKUS Your last dose was: Your next dose is: Take 1 Puff by inhalation every twelve (12) hours. 1 Puff  
    
   
   
   
  
 insulin glargine 100 unit/mL (3 mL) Inpn Commonly known as:  Clay Torres Your last dose was: Your next dose is:    
   
   
 30 Units by SubCUTAneous route nightly. Indications: type 2 diabetes mellitus 30 Units  
    
   
   
   
  
 ipratropium 0.02 % Soln Commonly known as:  ATROVENT Your last dose was: Your next dose is:    
   
   
 2.5 mL by Nebulization route four (4) times daily as needed. Indications: BRONCHOSPASM PREVENTION WITH COPD  
 0.5 mg  
    
   
   
   
  
 lisinopril 40 mg tablet Commonly known as:  Sharron Bianchi Your last dose was: Your next dose is: Take 20 mg by mouth two (2) times a day. Indications: hypertension 20 mg NovoLOG Flexpen U-100 Insulin 100 unit/mL Inpn Generic drug:  insulin aspart U-100 Your last dose was: Your next dose is:    
   
   
 Continue home Sliding scale insulin as prior to admission  
     
   
   
   
  
 omeprazole 40 mg capsule Commonly known as:  PRILOSEC Your last dose was: Your next dose is: Take 40 mg by mouth daily. Indications: gastroesophageal reflux disease 40 mg OXYGEN-AIR DELIVERY SYSTEMS Your last dose was: Your next dose is:    
   
   
 2 L by Nasal route continuous. First Choice 2 L  
    
   
   
   
  
 simethicone 80 mg chewable tablet Commonly known as:  [place] Your last dose was: Your next dose is: Take 80 mg by mouth every six (6) hours as needed for Flatulence. 80 mg  
    
   
   
   
  
 SINGULAIR 10 mg tablet Generic drug:  montelukast  
   
Your last dose was: Your next dose is: Take 10 mg by mouth nightly. 10 mg  
    
   
   
   
  
 umeclidinium 62.5 mcg/actuation inhaler Commonly known as:  INCRUSE ELLIPTA Your last dose was: Your next dose is: Take 1 Puff by inhalation daily. Indications: BRONCHOSPASM PREVENTION WITH COPD  
 1 Puff * Notice: This list has 3 medication(s) that are the same as other medications prescribed for you. Read the directions carefully, and ask your doctor or other care provider to review them with you. Where to Get Your Medications Information on where to get these meds will be given to you by the nurse or doctor. ! Ask your nurse or doctor about these medications  
  azithromycin 250 mg tablet  
 predniSONE 20 mg tablet Discharge Instructions DISCHARGE SUMMARY from Nurse PATIENT INSTRUCTIONS: 
 
After general anesthesia or intravenous sedation, for 24 hours or while taking prescription Narcotics: · Limit your activities · Do not drive and operate hazardous machinery · Do not make important personal or business decisions · Do  not drink alcoholic beverages · If you have not urinated within 8 hours after discharge, please contact your surgeon on call. Report the following to your surgeon: 
· Excessive pain, swelling, redness or odor of or around the surgical area · Temperature over 100.5 · Nausea and vomiting lasting longer than 4 hours or if unable to take medications · Any signs of decreased circulation or nerve impairment to extremity: change in color, persistent  numbness, tingling, coldness or increase pain · Any questions What to do at Home: 
Recommended activity: Activity as tolerated If you experience any of the following symptoms fever over 100, shortness of breath, severe pain, nausea, vomiting, dizziness, or any other symptoms that may cause problems, or any there conerns *  Please give a list of your current medications to your Primary Care Provider. Patient armband removed and shredded MyChart Activation Thank you for requesting access to [employer]. Please follow the instructions below to securely access and download your online medical record. [employer] allows you to send messages to your doctor, view your test results, renew your prescriptions, schedule appointments, and more. How Do I Sign Up? 1. In your internet browser, go to [email or web address] 
2. Click on the First Time User? Click Here link in the Sign In box. You will be redirect to the New Member Sign Up page. 3. Enter your [employer] Access Code exactly as it appears below. You will not need to use this code after youve completed the sign-up process. If you do not sign up before the expiration date, you must request a new code. [employer] Access Code: [email or web address] Expires: 10/14/2018  8:26 AM (This is the date your [employer] access code will ) 4. Enter the last four digits of your Social Security Number (xxxx) and Date of Birth (mm/dd/yyyy) as indicated and click Submit. You will be taken to the next sign-up page. 5. Create a [employer] ID. This will be your [employer] login ID and cannot be changed, so think of one that is secure and easy to remember. 6. Create a [employer] password. You can change your password at any time. 7. Enter your Password Reset Question and Answer. This can be used at a later time if you forget your password. 8. Enter your e-mail address. You will receive e-mail notification when new information is available in [address] 9. Click Sign Up. You can now view and download portions of your medical record. 10. Click the Download Summary menu link to download a portable copy of your medical information. Additional Information If you have questions, please visit the Frequently Asked Questions section of the [employer] website at [email or web address] Remember, MyChart is NOT to be used for urgent needs. For medical emergencies, dial 911. *  Please update this list whenever your medications are discontinued, doses are 
    changed, or new medications (including over-the-counter products) are added. *  Please carry medication information at all times in case of emergency situations. These are general instructions for a healthy lifestyle: No smoking/ No tobacco products/ Avoid exposure to second hand smoke Surgeon General's Warning:  Quitting smoking now greatly reduces serious risk to your health. Obesity, smoking, and sedentary lifestyle greatly increases your risk for illness A healthy diet, regular physical exercise & weight monitoring are important for maintaining a healthy lifestyle You may be retaining fluid if you have a history of heart failure or if you experience any of the following symptoms:  Weight gain of 3 pounds or more overnight or 5 pounds in a week, increased swelling in our hands or feet or shortness of breath while lying flat in bed. Please call your doctor as soon as you notice any of these symptoms; do not wait until your next office visit. Recognize signs and symptoms of STROKE: 
 
F-face looks uneven A-arms unable to move or move unevenly S-speech slurred or non-existent T-time-call 911 as soon as signs and symptoms begin-DO NOT go Back to bed or wait to see if you get better-TIME IS BRAIN. Warning Signs of HEART ATTACK Call 911 if you have these symptoms: 
? Chest discomfort. Most heart attacks involve discomfort in the center of the chest that lasts more than a few minutes, or that goes away and comes back. It can feel like uncomfortable pressure, squeezing, fullness, or pain. ? Discomfort in other areas of the upper body. Symptoms can include pain or discomfort in one or both arms, the back, neck, jaw, or stomach. ? Shortness of breath with or without chest discomfort. ? Other signs may include breaking out in a cold sweat, nausea, or lightheadedness. Don't wait more than five minutes to call [address]! Fast action can save your life. Calling 911 is almost always the fastest way to get lifesaving treatment. Emergency Medical Services staff can begin treatment when they arrive  up to an hour sooner than if someone gets to the hospital by car. The discharge information has been reviewed with the patient. The patient verbalized understanding. Discharge medications reviewed with the patient and appropriate educational materials and side effects teaching were provided. ___________________________________________________________________________________________________________________________________ Learning About COPD, Asthma, and Air Pollution How does air pollution affect COPD and asthma? When you have COPD or asthma, air pollution may make your symptoms worse. If it does, it means that air pollution is a trigger for you. It is important to know what your triggers are and how to deal with them. If air pollution is a trigger for you, you need to learn about air quality and pay attention to weather forecasts that include how bad the air is expected to be. How can you manage a flare-up caused by air pollution? · Do not panic. Quick treatment at home may help you prevent serious breathing problems. · Take your medicines exactly as your doctor tells you. ¨ Use your quick-relief inhaler as directed by your doctor. If your symptoms do not get better after you use your medicine, have someone take you to the emergency room. Call an ambulance if necessary. ¨ With inhaled medicines, a spacer or a nebulizer may help you get more medicine to your lungs. Ask your doctor or pharmacist how to use them properly. Practice using the spacer in front of a mirror before you have a flare-up. This may help you get the medicine into your lungs quickly. ¨ If your doctor has given you steroid pills, take them as directed. ¨ Talk to your doctor if you have any problems with your medicine. What can you do to prevent flare-ups? · Try not to be outside when air pollution levels are high. Stay at home with your windows closed. · Do not smoke. This is the most important step you can take to prevent more damage to your lungs and prevent problems. If you already smoke, it is never too late to stop. If you need help quitting, talk to your doctor about stop-smoking programs and medicines. These can increase your chances of quitting for good. · Avoid secondhand smoke; cold, dry air; and high altitudes. · Take your daily medicines as prescribed. · Avoid colds and flu. ¨ Get a pneumococcal vaccine. ¨ Get a flu vaccine each year, as soon as it is available. Ask those you live or work with to do the same, so they will not get the flu and infect you. ¨ Try to stay away from people with colds or the flu. ¨ Wash your hands often. Follow-up care is a key part of your treatment and safety. Be sure to make and go to all appointments, and call your doctor if you are having problems. It's also a good idea to know your test results and keep a list of the medicines you take. Where can you learn more? Go to http://etelvina-olivier.info/. Enter  in the search box to learn more about \"Learning About COPD, Asthma, and Air Pollution. \" Current as of: December 6, 2017 Content Version: 11.8 © 4928-5229 [employer]. Care instructions adapted under license by [employer] (which disclaims liability or warranty for this information). If you have questions about a medical condition or this instruction, always ask your healthcare professional. [place] [address] any warranty or liability for your use of this information. Chronic Obstructive Pulmonary Disease (COPD) Flare-Ups: Care Instructions Your Care Instructions Chronic obstructive pulmonary disease (COPD) is a lung disease that makes it hard to breathe. It is caused by damage to the lungs over many years, usually from smoking. COPD is often a mix of two diseases: · Chronic bronchitis: The airways that carry air to the lungs (bronchial tubes) get inflamed and make a lot of mucus. This can narrow or block the airways. · Emphysema: In a healthy person, the tiny air sacs in the lungs are like balloons. As you breathe in and out, they get bigger and smaller to move air through your lungs. But with emphysema, these air sacs are damaged and lose their stretch. Less air gets in and out of the lungs. Many people with COPD have attacks called flare-ups or exacerbations. This is when your usual symptoms quickly get worse and stay worse. The doctor has checked you carefully. But problems can develop later. If you notice any problems or new symptoms, get medical treatment right away. Follow-up care is a key part of your treatment and safety. Be sure to make and go to all appointments, and call your doctor if you are having problems. It's also a good idea to know your test results and keep a list of the medicines you take. How can you care for yourself at home? · Be safe with medicines. Take your medicines exactly as prescribed. Call your doctor if you think you are having a problem with your medicine. You may be taking medicines such as: ¨ Bronchodilators. These help open your airways and make breathing easier. ¨ Corticosteroids. These reduce airway inflammation. They may be given as pills, in a vein, or in an inhaled form. You may go home with pills in addition to an inhaler that you already use. · A spacer may help you get more inhaled medicine to your lungs. Ask your doctor or pharmacist if a spacer is right for you. If it is, ask how to use it properly. · If your doctor prescribed antibiotics, take them as directed.  Do not stop taking them just because you feel better. You need to take the full course of antibiotics. · If your doctor prescribed oxygen, use the flow rate your doctor has recommended. Do not change it without talking to your doctor first. 
· Do not smoke. Smoking makes COPD worse. If you need help quitting, talk to your doctor about stop-smoking programs and medicines. These can increase your chances of quitting for good. When should you call for help? Call 911 anytime you think you may need emergency care. For example, call if: 
  · You have severe trouble breathing.  
 Call your doctor now or seek immediate medical care if: 
  · You have new or worse trouble breathing.  
  · Your coughing or wheezing gets worse.  
  · You cough up dark brown or bloody mucus (sputum).  
  · You have a new or higher fever.  
 Watch closely for changes in your health, and be sure to contact your doctor if: 
  · You notice more mucus or a change in the color of your mucus.  
  · You need to use your antibiotic or steroid pills.  
  · You do not get better as expected. Where can you learn more? Go to http://etelvina-olivier.info/. Enter R893 in the search box to learn more about \"Chronic Obstructive Pulmonary Disease (COPD) Flare-Ups: Care Instructions. \" Current as of: December 6, 2017 Content Version: 11.8 © 7953-7082 [employer]orporated. Care instructions adapted under license by [employer] (which disclaims liability or warranty for this information). If you have questions about a medical condition or this instruction, always ask your healthcare professional. [place] [address] any warranty or liability for your use of this information. Introducing [facility]! [email or web address] introduces [employer] patient portal. Now you can access parts of your medical record, email your doctor's office, and request medication refills online.    
 
1. In your internet browser, go to [email or web address] 2. Click on the First Time User? Click Here link in the Sign In box. You will see the New Member Sign Up page. 3. Enter your [employer] Access Code exactly as it appears below. You will not need to use this code after youve completed the sign-up process. If you do not sign up before the expiration date, you must request a new code. · [employer] Access Code: [email or web address] Expires: 10/14/2018  8:26 AM 
 
4. Enter the last four digits of your Social Security Number (xxxx) and Date of Birth (mm/dd/yyyy) as indicated and click Submit. You will be taken to the next sign-up page. 5. Create a [employer] ID. This will be your [employer] login ID and cannot be changed, so think of one that is secure and easy to remember. 6. Create a [employer] password. You can change your password at any time. 7. Enter your Password Reset Question and Answer. This can be used at a later time if you forget your password. 8. Enter your e-mail address. You will receive e-mail notification when new information is available in [address] 9. Click Sign Up. You can now view and download portions of your medical record. 10. Click the Download Summary menu link to download a portable copy of your medical information. If you have questions, please visit the Frequently Asked Questions section of the [employer] website. Remember, [employer] is NOT to be used for urgent needs. For medical emergencies, dial 911. Now available from your iPhone and Android! Introducing Shahbaz Kilpatrick As a [facility] patient, I wanted to make you aware of our electronic visit tool called Shahbaz Kilpatrick. [facility] 24/7 allows you to connect within minutes with a medical provider 24 hours a day, seven days a week via a mobile device or tablet or logging into a secure website from your computer. You can access Shahbaz Kilpatrick from anywhere in the [place]. A virtual visit might be right for you when you have a simple condition and feel like you just dont want to get out of bed, or cant get away from work for an appointment, when your regular [employer] provider is not available (evenings, weekends or holidays), or when youre out of town and need minor care. Electronic visits cost only $49 and if the [employer] 24/7 provider determines a prescription is needed to treat your condition, one can be electronically transmitted to a nearby pharmacy*. Please take a moment to enroll today if you have not already done so. The enrollment process is free and takes just a few minutes. To enroll, please download the [employer] 24/7 james to your tablet or phone, or visit [email or web address] to enroll on your computer. And, as an [address] [place] [address] patient with a [employer] account, the results of your visits will be scanned into your electronic medical record and your primary care provider will be able to view the scanned results. We urge you to continue to see your regular [employer] provider for your ongoing medical care. And while your primary care provider may not be the one available when you seek a [employer] virtual visit, the peace of mind you get from getting a real diagnosis real time can be priceless. For more information on [employer], view our Frequently Asked Questions (FAQs) at [email or web address]. Sincerely, 
 
Sonal Field MD 
Chief Medical Officer Feliz Veronica Hernandez *:  certain medications cannot be prescribed via [employer] Providers Seen During Your Hospitalization Provider Specialty Primary office phone Byron Bruno DO Emergency Medicine [phone] Mando Riley MD Family Practice [phone] Juan M Padilla MD Family Practice [phone] Immunizations Administered for This Admission Name Date Influenza Vaccine (Quad) PF 10/11/2018 Pneumococcal Polysaccharide (PPSV-23) 10/11/2018 Your Primary Care Physician (PCP) Primary Care Physician Office Phone Office Fax Ellen Doty [phone] You are allergic to the following Allergen Reactions Ancef (Cefazolin) Hives Contrast Agent (Iodine) Anaphylaxis Shortness of Breath Swelling Needs pre-medication for IV contrast with Benadryl, Solu-Medrol Fish Containing Products Anaphylaxis Pt states she had a severe allergic reaction at 9 y/o. Metformin Other (comments) Abdominal pain, diarrhea. Codeine Other (comments) Altered mental status Recent Documentation Height Weight BMI OB Status Smoking Status 1.6 m 114.3 kg 44.64 kg/m2 Postmenopausal Former Smoker Emergency Contacts Name Discharge Info Relation Home Work Mobile Caryn Curran DISCHARGE CAREGIVER [3] Daughter [21] [phone] Sommer Rodriguez DISCHARGE CAREGIVER [3] Sister [23] [phone] Haritha Ballesteros  Child [2] [phone] Patient Belongings The following personal items are in your possession at time of discharge: 
  Dental Appliances: None  Visual Aid: None      Home Medications: None   Jewelry: None  Clothing: None    Other Valuables: Cell Phone, Other (comment) (own o2 tank ) Please provide this summary of care documentation to your next provider. Signatures-by signing, you are acknowledging that this After Visit Summary has been reviewed with you and you have received a copy. Patient Signature:  ____________________________________________________________ Date:  ____________________________________________________________  
  
Avery Staple Provider Signature:  ____________________________________________________________ Date:  ____________________________________________________________

## 2018-10-07 NOTE — ED TRIAGE NOTES
Patient states she went to the doctors 2 weeks ago and was given medicine, was feeling better but last night she started feeling short of breath.

## 2018-10-07 NOTE — IP AVS SNAPSHOT
[address] Patient: Roxy Valverde MRN: [id number] ESK:[id number] A check cole indicates which time of day the medication should be taken. My Medications START taking these medications Instructions Each Dose to Equal  
 Morning Noon Evening Bedtime  
 azithromycin 250 mg tablet Commonly known as:  Johnny Napoleon Your last dose was: Your next dose is: Take 1 Tab by mouth daily for 84 days. 250 mg CHANGE how you take these medications Instructions Each Dose to Equal  
 Morning Noon Evening Bedtime * predniSONE 10 mg dose pack Commonly known as:  STERAPRED DS What changed:  Another medication with the same name was added. Make sure you understand how and when to take each. Your last dose was: Your next dose is: As per pack * predniSONE 20 mg tablet Commonly known as:  Jose Colindres What changed: You were already taking a medication with the same name, and this prescription was added. Make sure you understand how and when to take each. Your last dose was: Your next dose is: Take 2 tablets daily with breakfast for 4 days (last 40 mg dose on Oct 15), then 1 tablet daily with breakfast for 10 additional days. * Notice: This list has 2 medication(s) that are the same as other medications prescribed for you. Read the directions carefully, and ask your doctor or other care provider to review them with you. CONTINUE taking these medications Instructions Each Dose to Equal  
 Morning Noon Evening Bedtime * albuterol 90 mcg/actuation inhaler Commonly known as:  PROVENTIL HFA, VENTOLIN HFA, PROAIR HFA Your last dose was: Your next dose is: Take 2 Puffs by inhalation every four (4) hours as needed for Wheezing. For the next 2 days after hospital discharge, use your inhaler 4 times a day. 2 Puff * albuterol 2.5 mg /3 mL (0.083 %) nebulizer solution Commonly known as:  PROVENTIL VENTOLIN Your last dose was: Your next dose is:    
   
   
 3 mL by Nebulization route every four (4) hours as needed for Wheezing. Indications: BRONCHOSPASM PREVENTION, Chronic Obstructive Pulmonary Disease 2.5 mg  
    
   
   
   
  
 * albuterol sulfate 90 mcg/actuation Aepb Your last dose was: Your next dose is: Take 1 Puff by inhalation every four (4) hours. 1 Puff  
    
   
   
   
  
 aspirin delayed-release 81 mg tablet Your last dose was: Your next dose is: Take 81 mg by mouth daily. 81 mg  
    
   
   
   
  
 cpap machine kit Your last dose was: Your next dose is:    
   
   
 by Does Not Apply route nightly. famotidine 20 mg tablet Commonly known as:  PEPCID Your last dose was: Your next dose is: Take 20 mg by mouth two (2) times daily as needed. 20 mg  
    
   
   
   
  
 FLONASE 50 mcg/actuation nasal spray Generic drug:  fluticasone Your last dose was: Your next dose is: 2 Sprays by Both Nostrils route daily. 2 Spray  
    
   
   
   
  
 fluticasone-salmeterol 100-50 mcg/dose diskus inhaler Commonly known as:  ADVAIR DISKUS Your last dose was: Your next dose is: Take 1 Puff by inhalation every twelve (12) hours. 1 Puff  
    
   
   
   
  
 insulin glargine 100 unit/mL (3 mL) Inpn Commonly known as:  Shawntis Felipe Your last dose was: Your next dose is:    
   
   
 30 Units by SubCUTAneous route nightly. Indications: type 2 diabetes mellitus 30 Units  
    
   
   
   
  
 ipratropium 0.02 % Soln Commonly known as:  ATROVENT Your last dose was: Your next dose is:    
   
   
 2.5 mL by Nebulization route four (4) times daily as needed. Indications: BRONCHOSPASM PREVENTION WITH COPD  
 0.5 mg  
    
   
   
   
  
 lisinopril 40 mg tablet Commonly known as:  Chong Anthony Your last dose was: Your next dose is: Take 20 mg by mouth two (2) times a day. Indications: hypertension 20 mg NovoLOG Flexpen U-100 Insulin 100 unit/mL Inpn Generic drug:  insulin aspart U-100 Your last dose was: Your next dose is:    
   
   
 Continue home Sliding scale insulin as prior to admission  
     
   
   
   
  
 omeprazole 40 mg capsule Commonly known as:  PRILOSEC Your last dose was: Your next dose is: Take 40 mg by mouth daily. Indications: gastroesophageal reflux disease 40 mg OXYGEN-AIR DELIVERY SYSTEMS Your last dose was: Your next dose is:    
   
   
 2 L by Nasal route continuous. First Choice 2 L  
    
   
   
   
  
 simethicone 80 mg chewable tablet Commonly known as:  Daniele Thomas Your last dose was: Your next dose is: Take 80 mg by mouth every six (6) hours as needed for Flatulence. 80 mg  
    
   
   
   
  
 SINGULAIR 10 mg tablet Generic drug:  montelukast  
   
Your last dose was: Your next dose is: Take 10 mg by mouth nightly. 10 mg  
    
   
   
   
  
 umeclidinium 62.5 mcg/actuation inhaler Commonly known as:  INCRUSE ELLIPTA Your last dose was: Your next dose is: Take 1 Puff by inhalation daily. Indications: BRONCHOSPASM PREVENTION WITH COPD  
 1 Puff * Notice: This list has 3 medication(s) that are the same as other medications prescribed for you.  Read the directions carefully, and ask your doctor or other care provider to review them with you. Where to Get Your Medications Information on where to get these meds will be given to you by the nurse or doctor. ! Ask your nurse or doctor about these medications  
  azithromycin 250 mg tablet  
 predniSONE 20 mg tablet

## 2018-10-07 NOTE — ED PROVIDER NOTES
EMERGENCY DEPARTMENT HISTORY AND PHYSICAL EXAM    4:47 PM      Date: 10/7/2018  Patient Name: Romina Marlow    History of Presenting Illness     Chief Complaint   Patient presents with    Shortness of Breath       61 y/o F with COPD on 2L home O2 presenting with SOB. Patient has had increased SOB x 2 weeks and saw PCP, who put her on oral and inhaled steroids with albuterol PRN. She has been using albuterol frequently with temporary improvement. She has not increased her home oxygen, just has been sitting still. Denies fever, increased cough, abdominal pain, N/V/D, sick contacts. PCP: Zain Escalante MD    Current Facility-Administered Medications   Medication Dose Route Frequency Provider Last Rate Last Dose    methylPREDNISolone (PF) (SOLU-MEDROL) injection 125 mg  125 mg IntraVENous NOW ANALI Cooper        magnesium sulfate 2 g/50 ml IVPB (premix or compounded)  2 g IntraVENous ONCE ANALI Cooper        albuterol (PROVENTIL VENTOLIN) nebulizer solution 5 mg  5 mg Nebulization NOW ANALI Cooper        albuterol-ipratropium (DUO-NEB) 2.5 MG-0.5 MG/3 ML  3 mL Nebulization NOW ANALI Cooper        sodium chloride 0.9 % bolus infusion 500 mL  500 mL IntraVENous ONCE ANALI Cooper         Current Outpatient Prescriptions   Medication Sig Dispense Refill    albuterol sulfate 90 mcg/actuation aepb Take 1 Puff by inhalation every four (4) hours. 1 Inhaler 0    predniSONE (STERAPRED DS) 10 mg dose pack As per pack 48 Tab 0    insulin glargine (LANTUS,BASAGLAR) 100 unit/mL (3 mL) inpn 30 Units by SubCUTAneous route nightly. Indications: type 2 diabetes mellitus 28 Units 0    NOVOLOG FLEXPEN U-100 INSULIN 100 unit/mL inpn Continue home Sliding scale insulin as prior to admission 1 Pen 0    fluticasone-salmeterol (ADVAIR DISKUS) 100-50 mcg/dose diskus inhaler Take 1 Puff by inhalation every twelve (12) hours.  1 Inhaler 0    umeclidinium (INCRUSE ELLIPTA) 62.5 mcg/actuation inhaler Take 1 Puff by inhalation daily. Indications: BRONCHOSPASM PREVENTION WITH COPD 1 Inhaler 0    ipratropium (ATROVENT) 0.02 % soln 2.5 mL by Nebulization route four (4) times daily as needed. Indications: BRONCHOSPASM PREVENTION WITH COPD 30 Vial 0    albuterol (PROVENTIL VENTOLIN) 2.5 mg /3 mL (0.083 %) nebulizer solution 3 mL by Nebulization route every four (4) hours as needed for Wheezing. Indications: BRONCHOSPASM PREVENTION, Chronic Obstructive Pulmonary Disease 30 Each 0    omeprazole (PRILOSEC) 40 mg capsule Take 40 mg by mouth daily. Indications: gastroesophageal reflux disease      lisinopril (PRINIVIL, ZESTRIL) 40 mg tablet Take 20 mg by mouth two (2) times a day. Indications: hypertension      simethicone (MYLICON) 80 mg chewable tablet Take 80 mg by mouth every six (6) hours as needed for Flatulence.  cpap machine kit by Does Not Apply route nightly.  OXYGEN-AIR DELIVERY SYSTEMS 2 L by Nasal route continuous. First Choice      aspirin delayed-release 81 mg tablet Take 81 mg by mouth daily.  famotidine (PEPCID) 20 mg tablet Take 20 mg by mouth two (2) times daily as needed.  albuterol (PROVENTIL HFA, VENTOLIN HFA, PROAIR HFA) 90 mcg/actuation inhaler Take 2 Puffs by inhalation every four (4) hours as needed for Wheezing. For the next 2 days after hospital discharge, use your inhaler 4 times a day. 1 Inhaler 0    fluticasone (FLONASE) 50 mcg/actuation nasal spray 2 Sprays by Both Nostrils route daily.  montelukast (SINGULAIR) 10 mg tablet Take 10 mg by mouth nightly.          Past History     Past Medical History:  Past Medical History:   Diagnosis Date    Asthma     Chronic lung disease     COPD     Cystocele, midline     Diabetes mellitus ([facility] [address]     GERD (gastroesophageal reflux disease)     Hidradenitis suppurativa     Hyperlipidemia     Hypertension     SHERRIE on CPAP     CPAP    Stress incontinence        Past Surgical History:  Past Surgical History: Procedure Laterality Date    BREAST SURGERY PROCEDURE UNLISTED      Right breast benign tumor removal    HX APPENDECTOMY      HX CHOLECYSTECTOMY      HX DILATION AND CURETTAGE      Dysfunctional uterine bleeding, thought 2/2 fibroids    HX TUBAL LIGATION         Family History:  Family History   Problem Relation Age of Onset    Hypertension Mother     Stroke Mother     Breast Cancer Mother      Bilateral mastectomies    Cancer Mother      ovarian and breast    Heart Failure Mother     Heart Attack Father      2011    Heart Surgery Father      CABG    Heart Failure Father     COPD Sister      Heavy smoker    Cancer Sister      ovarian    Heart Failure Sister     Lung Disease Sister     Asthma Child     Cancer Maternal Aunt      pancreatic    Cancer Maternal Grandfather      stomach       Social History:  Social History   Substance Use Topics    Smoking status: Former Smoker     Packs/day: 1.00     Years: 30.00     Types: Cigarettes     Start date: 5/6/1966     Quit date: 9/6/2006    Smokeless tobacco: Never Used      Comment: 1-1.5 packs per day    Alcohol use No       Allergies: Allergies   Allergen Reactions    Ancef [Cefazolin] Hives    Contrast Agent [Iodine] Anaphylaxis, Shortness of Breath and Swelling     Needs pre-medication for IV contrast with Benadryl, Solu-Medrol    Fish Containing Products Anaphylaxis     Pt states she had a severe allergic reaction at 9 y/o.  Metformin Other (comments)     Abdominal pain, diarrhea.  Codeine Other (comments)     Altered mental status         Review of Systems       Review of Systems   Constitutional: Negative for chills, diaphoresis, fatigue and fever. HENT: Negative for congestion. Eyes: Negative for photophobia and visual disturbance. Respiratory: Positive for chest tightness, shortness of breath and wheezing. Negative for cough. Cardiovascular: Negative for chest pain. Neurological: Negative for weakness and numbness. All other systems reviewed and are negative. Physical Exam     Visit Vitals    BP (!) 173/94 (BP 1 Location: Left arm, BP Patient Position: At rest;Sitting)    Pulse 88    Temp 96.7 °F (35.9 °C)    Resp 22    Ht 5' 3\" (1.6 m)    Wt 114.3 kg (252 lb)    SpO2 96%    BMI 44.64 kg/m2         Physical Exam   Constitutional: She is oriented to person, place, and time. She appears well-developed and well-nourished. She appears distressed. HENT:   Head: Normocephalic and atraumatic. Right Ear: External ear normal.   Left Ear: External ear normal.   Nose: Nose normal.   Mouth/Throat: Oropharynx is clear and moist.   Eyes: Conjunctivae and EOM are normal. Pupils are equal, round, and reactive to light. Neck: Normal range of motion. Neck supple. Cardiovascular: Normal rate, regular rhythm, normal heart sounds and intact distal pulses. No murmur heard. Pulmonary/Chest: She is in respiratory distress. She has wheezes. Reduced breath sounds in all lung fields, diffuse expiratory wheezes   Abdominal: Soft. She exhibits no distension. There is no tenderness. Musculoskeletal: Normal range of motion. She exhibits no edema or tenderness. Neurological: She is alert and oriented to person, place, and time. Skin: Skin is warm and dry. She is not diaphoretic. Psychiatric: She has a normal mood and affect. Nursing note and vitals reviewed. Diagnostic Study Results     Labs -  No results found for this or any previous visit (from the past 12 hour(s)). Radiologic Studies -   No orders to display         Medical Decision Making   I am the first provider for this patient. I reviewed the vital signs, available nursing notes, past medical history, past surgical history, family history and social history. ED Course: Progress Notes, Reevaluation, and Consults:      Provider Notes (Medical Decision Making): 61 y/o F with COPD exacerbation.  Patient in respiratory distress on arrival on home 2L O2, though with sats 96-97%. Tachypneic, conversationally dyspneic. Diffused expiratory wheezes with reduced air movement. Patient given duoneb treatment and 2x albuterol neb, solumedrol, and magnesium with improvement in respiratory status. On re-exam, wheezes had resolved though patient still had globally reduced breath sounds. Breathing comfortably on 2L O2, though still conversationally dyspneic. CXR without acute changes. Cardiac labs unremarkable. No leukocytosis. Afebrile, hemodynamically stable. Low concern for infectious or cardiac process at this time. Will admit to [facility] for further management. Patient understands and agrees with plan. For Hospitalized Patients:    1. Hospitalization Decision Time:  The decision to hospitalize the patient was made by Dr. Mohinder Leblanc at 1900 on 10/7/2018    2. Aspirin: Aspirin was not given because the patient did not present with a stroke at the time of their Emergency Department evaluation    Diagnosis     Clinical Impression:   1. Acute exacerbation of chronic obstructive pulmonary disease (COPD) ([facility])        Disposition: Admitted    Follow-up Information     None           Patient's Medications   Start Taking    No medications on file   Continue Taking    ALBUTEROL (PROVENTIL HFA, VENTOLIN HFA, PROAIR HFA) 90 MCG/ACTUATION INHALER    Take 2 Puffs by inhalation every four (4) hours as needed for Wheezing. For the next 2 days after hospital discharge, use your inhaler 4 times a day. ALBUTEROL (PROVENTIL VENTOLIN) 2.5 MG /3 ML (0.083 %) NEBULIZER SOLUTION    3 mL by Nebulization route every four (4) hours as needed for Wheezing. Indications: BRONCHOSPASM PREVENTION, Chronic Obstructive Pulmonary Disease    ALBUTEROL SULFATE 90 MCG/ACTUATION AEPB    Take 1 Puff by inhalation every four (4) hours. ASPIRIN DELAYED-RELEASE 81 MG TABLET    Take 81 mg by mouth daily. CPAP MACHINE KIT    by Does Not Apply route nightly.     FAMOTIDINE (PEPCID) 20 MG TABLET    Take 20 mg by mouth two (2) times daily as needed. FLUTICASONE (FLONASE) 50 MCG/ACTUATION NASAL SPRAY    2 Sprays by Both Nostrils route daily. FLUTICASONE-SALMETEROL (ADVAIR DISKUS) 100-50 MCG/DOSE DISKUS INHALER    Take 1 Puff by inhalation every twelve (12) hours. INSULIN GLARGINE (LANTUS,BASAGLAR) 100 UNIT/ML (3 ML) INPN    30 Units by SubCUTAneous route nightly. Indications: type 2 diabetes mellitus    IPRATROPIUM (ATROVENT) 0.02 % SOLN    2.5 mL by Nebulization route four (4) times daily as needed. Indications: BRONCHOSPASM PREVENTION WITH COPD    LISINOPRIL (PRINIVIL, ZESTRIL) 40 MG TABLET    Take 20 mg by mouth two (2) times a day. Indications: hypertension    MONTELUKAST (SINGULAIR) 10 MG TABLET    Take 10 mg by mouth nightly. NOVOLOG FLEXPEN U-100 INSULIN 100 UNIT/ML INPN    Continue home Sliding scale insulin as prior to admission    OMEPRAZOLE (PRILOSEC) 40 MG CAPSULE    Take 40 mg by mouth daily. Indications: gastroesophageal reflux disease    OXYGEN-AIR DELIVERY SYSTEMS    2 L by Nasal route continuous. First Choice    PREDNISONE (STERAPRED DS) 10 MG DOSE PACK    As per pack    SIMETHICONE (MYLICON) 80 MG CHEWABLE TABLET    Take 80 mg by mouth every six (6) hours as needed for Flatulence. UMECLIDINIUM (INCRUSE ELLIPTA) 62.5 MCG/ACTUATION INHALER    Take 1 Puff by inhalation daily.  Indications: BRONCHOSPASM PREVENTION WITH COPD   These Medications have changed    No medications on file   Stop Taking    No medications on file

## 2018-10-08 LAB
ANION GAP SERPL CALC-SCNC: 12 MMOL/L (ref 3–18)
ATRIAL RATE: 79 BPM
BASOPHILS # BLD: 0 K/UL (ref 0–0.1)
BASOPHILS NFR BLD: 0 % (ref 0–2)
BUN SERPL-MCNC: 13 MG/DL (ref 7–18)
BUN/CREAT SERPL: 14 (ref 12–20)
CALCIUM SERPL-MCNC: 9.2 MG/DL (ref 8.5–10.1)
CALCULATED P AXIS, ECG09: 56 DEGREES
CALCULATED R AXIS, ECG10: 32 DEGREES
CALCULATED T AXIS, ECG11: 46 DEGREES
CHLORIDE SERPL-SCNC: 102 MMOL/L (ref 100–108)
CO2 SERPL-SCNC: 24 MMOL/L (ref 21–32)
CREAT SERPL-MCNC: 0.95 MG/DL (ref 0.6–1.3)
DIAGNOSIS, 93000: NORMAL
DIFFERENTIAL METHOD BLD: ABNORMAL
EOSINOPHIL # BLD: 0 K/UL (ref 0–0.4)
EOSINOPHIL NFR BLD: 0 % (ref 0–5)
ERYTHROCYTE [DISTWIDTH] IN BLOOD BY AUTOMATED COUNT: 13.6 % (ref 11.6–14.5)
EST. AVERAGE GLUCOSE BLD GHB EST-MCNC: 174 MG/DL
GLUCOSE BLD STRIP.AUTO-MCNC: 262 MG/DL (ref 70–110)
GLUCOSE BLD STRIP.AUTO-MCNC: 292 MG/DL (ref 70–110)
GLUCOSE BLD STRIP.AUTO-MCNC: 345 MG/DL (ref 70–110)
GLUCOSE BLD STRIP.AUTO-MCNC: 354 MG/DL (ref 70–110)
GLUCOSE BLD STRIP.AUTO-MCNC: 387 MG/DL (ref 70–110)
GLUCOSE SERPL-MCNC: 279 MG/DL (ref 74–99)
HBA1C MFR BLD: 7.7 % (ref 4.2–5.6)
HCT VFR BLD AUTO: 37.7 % (ref 35–45)
HGB BLD-MCNC: 13 G/DL (ref 12–16)
LYMPHOCYTES # BLD: 0.7 K/UL (ref 0.9–3.6)
LYMPHOCYTES NFR BLD: 8 % (ref 21–52)
MCH RBC QN AUTO: 30.3 PG (ref 24–34)
MCHC RBC AUTO-ENTMCNC: 34.5 G/DL (ref 31–37)
MCV RBC AUTO: 87.9 FL (ref 74–97)
MONOCYTES # BLD: 0.1 K/UL (ref 0.05–1.2)
MONOCYTES NFR BLD: 1 % (ref 3–10)
NEUTS SEG # BLD: 7.9 K/UL (ref 1.8–8)
NEUTS SEG NFR BLD: 91 % (ref 40–73)
P-R INTERVAL, ECG05: 150 MS
PLATELET # BLD AUTO: 265 K/UL (ref 135–420)
PMV BLD AUTO: 9 FL (ref 9.2–11.8)
POTASSIUM SERPL-SCNC: 4.7 MMOL/L (ref 3.5–5.5)
Q-T INTERVAL, ECG07: 390 MS
QRS DURATION, ECG06: 90 MS
QTC CALCULATION (BEZET), ECG08: 447 MS
RBC # BLD AUTO: 4.29 M/UL (ref 4.2–5.3)
SODIUM SERPL-SCNC: 138 MMOL/L (ref 136–145)
VENTRICULAR RATE, ECG03: 79 BPM
WBC # BLD AUTO: 8.6 K/UL (ref 4.6–13.2)

## 2018-10-08 PROCEDURE — 74011636637 HC RX REV CODE- 636/637: Performed by: FAMILY MEDICINE

## 2018-10-08 PROCEDURE — 74011000250 HC RX REV CODE- 250: Performed by: INTERNAL MEDICINE

## 2018-10-08 PROCEDURE — 94640 AIRWAY INHALATION TREATMENT: CPT

## 2018-10-08 PROCEDURE — 36415 COLL VENOUS BLD VENIPUNCTURE: CPT

## 2018-10-08 PROCEDURE — 74011250636 HC RX REV CODE- 250/636: Performed by: STUDENT IN AN ORGANIZED HEALTH CARE EDUCATION/TRAINING PROGRAM

## 2018-10-08 PROCEDURE — 83036 HEMOGLOBIN GLYCOSYLATED A1C: CPT | Performed by: FAMILY MEDICINE

## 2018-10-08 PROCEDURE — 82962 GLUCOSE BLOOD TEST: CPT

## 2018-10-08 PROCEDURE — 65660000000 HC RM CCU STEPDOWN

## 2018-10-08 PROCEDURE — 80048 BASIC METABOLIC PNL TOTAL CA: CPT

## 2018-10-08 PROCEDURE — 74011000250 HC RX REV CODE- 250: Performed by: STUDENT IN AN ORGANIZED HEALTH CARE EDUCATION/TRAINING PROGRAM

## 2018-10-08 PROCEDURE — 74011636637 HC RX REV CODE- 636/637: Performed by: STUDENT IN AN ORGANIZED HEALTH CARE EDUCATION/TRAINING PROGRAM

## 2018-10-08 PROCEDURE — 85025 COMPLETE CBC W/AUTO DIFF WBC: CPT

## 2018-10-08 PROCEDURE — 74011250637 HC RX REV CODE- 250/637: Performed by: FAMILY MEDICINE

## 2018-10-08 PROCEDURE — 77010033678 HC OXYGEN DAILY

## 2018-10-08 PROCEDURE — 74011250637 HC RX REV CODE- 250/637: Performed by: STUDENT IN AN ORGANIZED HEALTH CARE EDUCATION/TRAINING PROGRAM

## 2018-10-08 RX ORDER — ARFORMOTEROL TARTRATE 15 UG/2ML
15 SOLUTION RESPIRATORY (INHALATION)
Status: DISCONTINUED | OUTPATIENT
Start: 2018-10-08 | End: 2018-10-11 | Stop reason: HOSPADM

## 2018-10-08 RX ORDER — BUDESONIDE 1 MG/2ML
1000 INHALANT ORAL
Status: DISCONTINUED | OUTPATIENT
Start: 2018-10-08 | End: 2018-10-11 | Stop reason: HOSPADM

## 2018-10-08 RX ORDER — IPRATROPIUM BROMIDE AND ALBUTEROL SULFATE 2.5; .5 MG/3ML; MG/3ML
3 SOLUTION RESPIRATORY (INHALATION)
Status: DISCONTINUED | OUTPATIENT
Start: 2018-10-08 | End: 2018-10-08

## 2018-10-08 RX ADMIN — INSULIN LISPRO 12 UNITS: 100 INJECTION, SOLUTION INTRAVENOUS; SUBCUTANEOUS at 11:58

## 2018-10-08 RX ADMIN — METHYLPREDNISOLONE SODIUM SUCCINATE 60 MG: 125 INJECTION, POWDER, FOR SOLUTION INTRAMUSCULAR; INTRAVENOUS at 17:46

## 2018-10-08 RX ADMIN — METHYLPREDNISOLONE SODIUM SUCCINATE 60 MG: 125 INJECTION, POWDER, FOR SOLUTION INTRAMUSCULAR; INTRAVENOUS at 11:57

## 2018-10-08 RX ADMIN — FAMOTIDINE 20 MG: 20 TABLET ORAL at 17:45

## 2018-10-08 RX ADMIN — ENOXAPARIN SODIUM 40 MG: 100 INJECTION SUBCUTANEOUS at 08:32

## 2018-10-08 RX ADMIN — INSULIN GLARGINE 30 UNITS: 100 INJECTION, SOLUTION SUBCUTANEOUS at 22:00

## 2018-10-08 RX ADMIN — IPRATROPIUM BROMIDE AND ALBUTEROL SULFATE 3 ML: .5; 3 SOLUTION RESPIRATORY (INHALATION) at 04:14

## 2018-10-08 RX ADMIN — METHYLPREDNISOLONE SODIUM SUCCINATE 60 MG: 125 INJECTION, POWDER, FOR SOLUTION INTRAMUSCULAR; INTRAVENOUS at 00:23

## 2018-10-08 RX ADMIN — LEVOFLOXACIN 750 MG: 5 INJECTION, SOLUTION INTRAVENOUS at 23:08

## 2018-10-08 RX ADMIN — IPRATROPIUM BROMIDE AND ALBUTEROL SULFATE 3 ML: .5; 3 SOLUTION RESPIRATORY (INHALATION) at 08:07

## 2018-10-08 RX ADMIN — MONTELUKAST SODIUM 10 MG: 10 TABLET, COATED ORAL at 21:46

## 2018-10-08 RX ADMIN — LEVOFLOXACIN 750 MG: 5 INJECTION, SOLUTION INTRAVENOUS at 00:23

## 2018-10-08 RX ADMIN — ASPIRIN 81 MG: 81 TABLET, COATED ORAL at 08:06

## 2018-10-08 RX ADMIN — BUDESONIDE 1000 MCG: 1 SUSPENSION RESPIRATORY (INHALATION) at 14:50

## 2018-10-08 RX ADMIN — INSULIN LISPRO 15 UNITS: 100 INJECTION, SOLUTION INTRAVENOUS; SUBCUTANEOUS at 21:46

## 2018-10-08 RX ADMIN — METHYLPREDNISOLONE SODIUM SUCCINATE 60 MG: 125 INJECTION, POWDER, FOR SOLUTION INTRAMUSCULAR; INTRAVENOUS at 23:08

## 2018-10-08 RX ADMIN — ACETAMINOPHEN 650 MG: 325 TABLET ORAL at 17:50

## 2018-10-08 RX ADMIN — ARFORMOTEROL TARTRATE 15 MCG: 15 SOLUTION RESPIRATORY (INHALATION) at 14:50

## 2018-10-08 RX ADMIN — PANTOPRAZOLE SODIUM 40 MG: 40 TABLET, DELAYED RELEASE ORAL at 08:06

## 2018-10-08 RX ADMIN — ARFORMOTEROL TARTRATE 15 MCG: 15 SOLUTION RESPIRATORY (INHALATION) at 20:44

## 2018-10-08 RX ADMIN — INSULIN LISPRO 9 UNITS: 100 INJECTION, SOLUTION INTRAVENOUS; SUBCUTANEOUS at 17:51

## 2018-10-08 RX ADMIN — METHYLPREDNISOLONE SODIUM SUCCINATE 60 MG: 125 INJECTION, POWDER, FOR SOLUTION INTRAMUSCULAR; INTRAVENOUS at 06:07

## 2018-10-08 RX ADMIN — INSULIN GLARGINE 30 UNITS: 100 INJECTION, SOLUTION SUBCUTANEOUS at 00:00

## 2018-10-08 RX ADMIN — LISINOPRIL 40 MG: 40 TABLET ORAL at 08:06

## 2018-10-08 RX ADMIN — INSULIN LISPRO 9 UNITS: 100 INJECTION, SOLUTION INTRAVENOUS; SUBCUTANEOUS at 08:32

## 2018-10-08 RX ADMIN — FAMOTIDINE 20 MG: 20 TABLET ORAL at 08:07

## 2018-10-08 RX ADMIN — BUDESONIDE 1000 MCG: 1 SUSPENSION RESPIRATORY (INHALATION) at 20:44

## 2018-10-08 NOTE — PROGRESS NOTES
Brief Progress Note     S: patient reports breathing is improved but still not at baseline. She was seen by Dr. Eron Rainey from pulmonology today who discussed her current exacerbation is likely asthma-COPD overlap syndrome. Patient reports she believes her exacerbations may be triggered from seasonal allergies. Patient had some concerns about her insurance and is considering switching plans. O:   Patient Vitals for the past 4 hrs:   Temp Pulse Resp BP SpO2   10/08/18 1938 97.4 °F (36.3 °C) 84 18 139/75 97 %   10/08/18 1603 97.1 °F (36.2 °C) 86 22 103/66 94 %       General: patient appears more comfortable today, still slightly dyspneic but able to speak in complete sentences   CV: RRR  Lungs: mild wheezes b/l     A/P  Instructed patient to ask pulmonology tomorrow what inhaler regimen she should be discharged on. Patient had CPAP machine brought in while we were in the room. Patient currently on brovana. Discussed with patient that may try for prior authorization and approval for Spiriva to control her frequent exacerbations and hospitalizations.  Senior resident Dr. Marcelo Ashton present [facility] discussion.   - continue with plan as per daily progress note     Miri Avendaño MD PGY-1  [address]

## 2018-10-08 NOTE — NURSE NAVIGATOR
Reason for Admission:   Exacerbation of COPD               RRAT Score:     28             Resources/supports as identified by patient/family:                   Top Challenges facing patient (as identified by patient/family and CM): Finances/Medication cost?                    Transportation? Uses public transportation when daughter not available. Support system or lack thereof? Daughter     Living arrangements? Lives with daughter           Self-care/ADLs/Cognition? Needs assist with ADL's/self care due to SOB. Current Advanced Directive/Advance Care Plan:                            Plan for utilizing home health:    May need                       Likelihood of readmission:  High due to diagnosis                 Transition of Care Plan: This patient lives in a 2 story home with her daughter. Per patient, there is a bedroom on 1st floor but the bathroom is on the second floor. She states that she needs assistance with her self care/ADL's due to her breathing condition. Her daughter will be available to transport patient home upon discharge. They have a Bedside commode, CPap, oxygen and a walker already in the home. Patient also uses public transportation at times to assist her with transportation to appt's when daughter not available. List of  home health  agencies left at bedside with the patient for review prior to discharge. Patient inquired about personal care - states paperwork was done with previous admission - will check on status      Care Management Interventions  PCP Verified by CM: Yes  Last Visit to PCP: 10/01/18  Mode of Transport at Discharge: Self  Transition of Care Consult (CM Consult): Home Health  Current Support Network: [place], Other (lives with daughter)  Confirm Follow Up Transport: Family  Plan discussed with Pt/Family/Caregiver: Yes        Karina Kendrick.  ANA M Dobson, RN  Care Management

## 2018-10-08 NOTE — PROGRESS NOTES
LOC: Alert x 4    Vitals: wnl,       IV: 20 R ar. patent      Intake: 480x2      Output: voided ( 4 occurences ), clear yellow urine      Changes: None

## 2018-10-08 NOTE — ROUTINE PROCESS
Bedside and Verbal shift change report given to JOSÉ MIGUEL Momin (oncoming nurse) by Shon Baker RN   (offgoing nurse). Report included the following information SBAR, Kardex, Intake/Output and MAR.

## 2018-10-08 NOTE — ROUTINE PROCESS
TRANSFER - OUT REPORT:    Verbal report given to JOSÉ MIGUEL Aguero (name) on Alfie Paz  being transferred to [facility] for routine progression of care       Report consisted of patients Situation, Background, Assessment and   Recommendations(SBAR). Information from the following report(s) SBAR, ED Summary and MAR was reviewed with the receiving nurse. Lines:   Peripheral IV 10/07/18 Right Antecubital (Active)   Site Assessment Clean, dry, & intact 10/7/2018  8:51 PM   Phlebitis Assessment 0 10/7/2018  8:51 PM   Infiltration Assessment 0 10/7/2018  8:51 PM   Dressing Status Clean, dry, & intact 10/7/2018  8:51 PM   Dressing Type Transparent 10/7/2018  8:51 PM   Hub Color/Line Status Patent; Flushed 10/7/2018  8:51 PM   Action Taken Blood drawn 10/7/2018  8:51 PM        Opportunity for questions and clarification was provided.       Patient transported with:   [employer]

## 2018-10-08 NOTE — PROGRESS NOTES
met with patient, completed the initial Spiritual Assessment of the patient, and offered Pastoral Care, see flow sheets for interventions. Pastoral support provided. Patient said she is doing all right. She has family who are supportive. She said she has had her medical condition for awhile and has accepted having a difficult time breathing. Her [place] scout is important to her well being and peaceful attitude. Patient does not have any Jew/cultural needs that will affect patients preferences in health care. Chart reviewed. Chaplains will continue to follow and will provide pastoral care on an as needed/as requested basis. Franck Coe MDiv.   Board Certified [employer] [phone]

## 2018-10-08 NOTE — PROGRESS NOTES
Intern Progress Note  [facility]       Patient: Henok Kapadia MRN: [id number]  CSN: [id number]    YOB: 1960  Age: 60 y.o. Sex: female    DOA: 10/7/2018 LOS:  LOS: 1 day   PCP: Radha Garcia MD                 Subjective:     Acute events: no acute events. Patient states she is feeling better and breathing better. Still feeling chest tightness, states she has abdominal pain that is there all the time. Patient asking if CPAP will be given to her tonight. Only other concern is her blood sugar which she states was rechecked and found to be in 380s. Brief ROS: positive for chest tightness, abdominal tenderness   SOB improved    Objective:      Patient Vitals for the past 24 hrs:   Temp Pulse Resp BP SpO2   10/08/18 0015 98.2 °F (36.8 °C) 76 18 143/69 96 %   10/07/18 2149 98.2 °F (36.8 °C) 82 18 150/70 95 %   10/07/18 2146 - - - - 95 %   10/07/18 2030 - 91 23 151/79 95 %   10/07/18 2015 - 90 25 147/72 98 %   10/07/18 2000 - 89 29 143/67 97 %   10/07/18 1945 - 93 22 158/71 96 %   10/07/18 1930 - 85 24 153/69 96 %   10/07/18 1915 - 85 25 150/68 95 %   10/07/18 1815 - 86 20 135/71 95 %   10/07/18 1800 - 82 24 154/74 98 %   10/07/18 1745 - 83 20 142/83 97 %   10/07/18 1730 - 86 24 162/78 97 %   10/07/18 1715 - 82 22 193/85 100 %   10/07/18 1700 - 85 - 185/89 100 %   10/07/18 1634 96.7 °F (35.9 °C) 88 22 (!) 173/94 96 %       Physical Exam:   General: patient appears more comfortable, less dyspneic than on admission  Cardiovascular: RRR w/o MRGs  Respiratory: diminished breath sounds, mild expiratory wheezes b/l, dyspnea as improved  Abdomen: Soft, +BS, abdominal tenderness, Non-Distended  Extremities: no peripheral edema   Neuro: Cranial nerves grossly intact   Skin: Negative for lesions, ulcers, rashes.      Lab/Data Reviewed:  BMP:   Lab Results   Component Value Date/Time     10/07/2018 05:05 PM    K 3.8 10/07/2018 05:05 PM     10/07/2018 05:05 PM    CO2 28 10/07/2018 05:05 PM    AGAP 7 10/07/2018 05:05 PM     (H) 10/07/2018 05:05 PM    BUN 10 10/07/2018 05:05 PM    CREA 0.87 10/07/2018 05:05 PM    GFRAA >60 10/07/2018 05:05 PM    GFRNA >60 10/07/2018 05:05 PM     CMP:   Lab Results   Component Value Date/Time     10/07/2018 05:05 PM    K 3.8 10/07/2018 05:05 PM     10/07/2018 05:05 PM    CO2 28 10/07/2018 05:05 PM    AGAP 7 10/07/2018 05:05 PM     (H) 10/07/2018 05:05 PM    BUN 10 10/07/2018 05:05 PM    CREA 0.87 10/07/2018 05:05 PM    GFRAA >60 10/07/2018 05:05 PM    GFRNA >60 10/07/2018 05:05 PM    CA 8.9 10/07/2018 05:05 PM     CBC:   Lab Results   Component Value Date/Time    WBC 5.8 10/07/2018 05:05 PM    HGB 13.3 10/07/2018 05:05 PM    HCT 37.2 10/07/2018 05:05 PM     10/07/2018 05:05 PM        CBC w/Diff Recent Labs      10/07/18   1705   WBC  5.8   RBC  4.34   HGB  13.3   HCT  37.2   PLT  250   GRANS  59   LYMPH  33   EOS  3      Chemistry Recent Labs      10/07/18   2303  10/07/18   1705   GLUCPOC  368*   --    GLU   --   193*   NA   --   139   K   --   3.8   CL   --   104   CO2   --   28   BUN   --   10   CREA   --   0.87   CA   --   8.9   AGAP   --   7   BUCR   --   11*        Microbiology No results for input(s): SDES, CULT in the last 72 hours. No results for input(s): CULT in the last 72 hours.    I/O   Intake/Output Summary (Last 24 hours) at 10/08/18 0233  Last data filed at 10/07/18 2146   Gross per 24 hour   Intake              480 ml   Output                0 ml   Net              480 ml          Scheduled Medications Reviewed:  Current Facility-Administered Medications   Medication Dose Route Frequency    albuterol (PROVENTIL VENTOLIN) 2.5 mg /3 mL (0.083 %) nebulizer solution        aspirin delayed-release tablet 81 mg  81 mg Oral DAILY    famotidine (PEPCID) tablet 20 mg  20 mg Oral BID    lisinopril (PRINIVIL, ZESTRIL) tablet 40 mg  40 mg Oral DAILY    fluticasone (FLONASE) 50 mcg/actuation nasal spray 2 Spray  2 Spray Both Nostrils DAILY    pantoprazole (PROTONIX) tablet 40 mg  40 mg Oral DAILY    enoxaparin (LOVENOX) injection 40 mg  40 mg SubCUTAneous Q24H    montelukast (SINGULAIR) tablet 10 mg  10 mg Oral QHS    insulin glargine (LANTUS) injection 30 Units  30 Units SubCUTAneous QHS    albuterol-ipratropium (DUO-NEB) 2.5 MG-0.5 MG/3 ML  3 mL Nebulization Q4H RT    methylPREDNISolone (PF) (SOLU-MEDROL) injection 60 mg  60 mg IntraVENous Q6H    levoFLOXacin (LEVAQUIN) 750 mg in D5W IVPB  750 mg IntraVENous Q24H    insulin lispro (HUMALOG) injection   SubCUTAneous AC&HS                    Assessment/Plan   60 y.o. female with PMH COPD on home O2, HTN, DM, Diastolic CHF, SHERRIE now admitted with worsening sob and cough. Now being treated for COPD exacerbation.      COPD exacerbation/productive cough  Improving. Likely in setting of medication non-adherence, with elevated BP vs infectious with cough productive of yellow mucous. Patient reports hemoptysis as well. ABG pH 7.47 PCO2 40.4, PO2 149, HCO3 29.7. EKG NSR and unchanged from prior study. Troponin's neg x1. CXR without obvious infiltrate, pending official report. Satting well on 2L NC. Afebrile and WBC wnl. Recently seen in PF for COPD exacerbation and started on duonebs, budesonide, and prednisone taper prior to admission. Home regimen usually consists of albuterol, incruse, advair, and Singulair. Patient has had multiple admissions for COPD this year. - admited to telemetry   - duonebs q4h  - methylprednisolone 60 mg q6hr   - Levaquin 750 mg daily  - continue supplemental O2   - continue Singulair   - continue home flonase  - CPAP at night   - FU sputum cultures  - FU CXR report   - daily BMP, CBC  - consult pulmonolgy in am      Diastolic CHF  Stable on exam, no JVD, no peripheral edema. BNP 46. SOB and orthopnea likely in setting of current COPD exacerbation. Followed by Dr. Skye Wiley cardiology.  Most recent echo 7/7/18 EF 66-70% with mild concentric LV hypertrophy. - continue telemetry   - continue aspirin   - continue lisinopril 40 mg daily      HTN   BP elevated to 209F systolic on admission. Improved to 140-150/70s  - continue home lisinopril 40 mg daily      DM   Blood glucose likely recently elevated in setting of steroid use. - continue glargine 30 U nightly   - SSI      Frontal sinus osteoma  Found on CT scan of sinuses during admission in July 2018. Non-obstructing. Patient followed by [facility]. Next appointment in November.    - continue outpatient follow up with ENT     SHERRIE  - CPAP at night      GERD  - continue home Protonix and pepcid, and simethicone      Diet: Diabetic  DVT Prophylaxis: lovenox  Code Status: Full  Point of Contact: Pam Arellano (daughter) [phone]     Disposition and anticipated LOS: 2 midnights      Rachael Chaney MD PGY-1  10/8/2018, 2:33 AM

## 2018-10-08 NOTE — CONSULTS
[facility]  Pulmonary, Critical Care, and Sleep Medicine      Pulmonary  Consultation    Name: Hector Lam     : 1960     Date: 10/8/2018        Subjective:     Patient is a 60 y.o. female  with PMHx of Asthma- COPD (with four hospitalizations this year),  insulin dependent DM, hypertension and GERD who presented on 10/7/18 with worsening dyspnea.       Patient was treated in clinic on  18 for a mild COPD exacerbation. Her symptoms initially improved with the PO Prednisone taper she was started on but worsened two nights ago on 10/5/18. Patient stated that her symptoms significantly deteriorated after she started to lower the Prednisone taper down to 10 mg daily. She stated that her chest was feeling tight and that her breathing became worse which prompted her to come to the ER. She has been battling with frequent exacerbations in past 10 months. 5 months/10 she has had some degree of exacerbation. Recently her PCP instructed her to hold the Incruse and Advair and use nebulized Budesenide. , duonebs at home. She has home O2 at 2 L. She has had persistent cough with occasional yellow mucus, thick white to foamy mucus and some increase in Sinus congestion. Denies GERD  No fevers or chills. She endorsed chest tightness and symptoms of a racing heart. She endorsed SOB and a minimally productive cough. Endorsed slight nausea but no vomiting. In July she had IgE checked- 157. Allergen profile - Negative      Asthma History;  Frequency of symptoms: almost daily Cough, intermittent wheezing, in day time and night time with seasonal- spring and fall worsening  Triggers: environmental, smoke, dust,   No PET's.  No change in home environment  Relief from-  less than twice a week and daily  Peak Flows: personal best  H/o ER visits  No H/o previous intubation  H/o nasal congestion, postnasal drainage, sneezing  H/o itchy eyes, skin rash, itching  H/o nasal /sinus surgery  No H/o Aspirin allergy  No H/o travel  No H/o smoking, recreational drug use  H/o Sleep related symptoms- snoring, awakening due to SOB, chiking, wheezing  No PETs- Cats, dogs, birds  Occupational: no exposures    Past Medical History:   Diagnosis Date    Asthma     Chronic lung disease     COPD     Cystocele, midline     Diabetes mellitus ([facility] [address]     GERD (gastroesophageal reflux disease)     Hidradenitis suppurativa     Hyperlipidemia     Hypertension     SHERRIE on CPAP     CPAP    Stress incontinence        Past Surgical History:   Procedure Laterality Date    BREAST SURGERY PROCEDURE UNLISTED      Right breast benign tumor removal    HX APPENDECTOMY      HX CHOLECYSTECTOMY      HX DILATION AND CURETTAGE      Dysfunctional uterine bleeding, thought 2/2 fibroids    HX TUBAL LIGATION         Social History     Social History    Marital status: LEGALLY      Spouse name: N/A    Number of children: N/A    Years of education: N/A     Social History Main Topics    Smoking status: Former Smoker     Packs/day: 1.00     Years: 30.00     Types: Cigarettes     Start date: 5/6/1966     Quit date: 9/6/2006    Smokeless tobacco: Never Used      Comment: 1-1.5 packs per day    Alcohol use No    Drug use: No    Sexual activity: No     Other Topics Concern    Not on file     Social History Narrative       Family History   Problem Relation Age of Onset    Hypertension Mother     Stroke Mother     Breast Cancer Mother      Bilateral mastectomies    Cancer Mother      ovarian and breast    Heart Failure Mother     Heart Attack Father      2011    Heart Surgery Father      CABG    Heart Failure Father     COPD Sister      Heavy smoker    Cancer Sister      ovarian    Heart Failure Sister     Lung Disease Sister     Asthma Child     Cancer Maternal Aunt      pancreatic    Cancer Maternal Grandfather      stomach     Allergies   Allergen Reactions    Ancef [Cefazolin] Hives    Contrast Agent [Iodine] Anaphylaxis, Shortness of Breath and Swelling     Needs pre-medication for IV contrast with Benadryl, Solu-Medrol    Fish Containing Products Anaphylaxis     Pt states she had a severe allergic reaction at 7 y/o.  Metformin Other (comments)     Abdominal pain, diarrhea.     Codeine Other (comments)     Altered mental status     Current Facility-Administered Medications   Medication Dose Route Frequency Provider Last Rate Last Dose    budesonide (PULMICORT) 1,000 mcg/2 mL nebulizer susp  1,000 mcg Nebulization BID RT Troy Tate MD        arformoterol (BROVANA) neb solution 15 mcg  15 mcg Nebulization BID RT Troy Tate MD       [address] [place] [address] Anna Prow ON 10/9/2018] tiotropium (SPIRIVA) inhalation capsule 18 mcg  1 Cap Inhalation DAILY Troy Tate MD        aspirin delayed-release tablet 81 mg  81 mg Oral DAILY Joelle Duque MD   81 mg at 10/08/18 0806    famotidine (PEPCID) tablet 20 mg  20 mg Oral BID Joelle Duque MD   20 mg at 10/08/18 4087    lisinopril (PRINIVIL, ZESTRIL) tablet 40 mg  40 mg Oral DAILY Joelle Duque MD   40 mg at 10/08/18 0806    fluticasone (FLONASE) 50 mcg/actuation nasal spray 2 Spray  2 Spray Both Nostrils DAILY Joelle Duque MD        pantoprazole (PROTONIX) tablet 40 mg  40 mg Oral DAILY Joelle Duque MD   40 mg at 10/08/18 0806    simethicone (MYLICON) tablet 80 mg  80 mg Oral Q6H PRN Joelle Duque MD        acetaminophen (TYLENOL) tablet 650 mg  650 mg Oral Q4H PRN Joelle Duque MD        enoxaparin (LOVENOX) injection 40 mg  40 mg SubCUTAneous Q24H Joelle Duque MD   40 mg at 10/08/18 5924    montelukast (SINGULAIR) tablet 10 mg  10 mg Oral QHS Frankie Shepherd MD        insulin glargine (LANTUS) injection 30 Units  30 Units SubCUTAneous QHS Frankie Shepherd MD   30 Units at 10/08/18 0000    methylPREDNISolone (PF) (SOLU-MEDROL) injection 60 mg  60 mg IntraVENous Q6H Joelle Duque MD   60 mg at 10/08/18 0607    levoFLOXacin (LEVAQUIN) 750 mg in D5W IVPB  750 mg IntraVENous Q24H Fede Spence  mL/hr at 10/08/18 0023 750 mg at 10/08/18 0023    insulin lispro (HUMALOG) injection   SubCUTAneous AC&HS Fede Spence MD   9 Units at 10/08/18 4781    glucose chewable tablet 16 g  4 Tab Oral PRN Fede Spence MD        glucagon [facility]) injection 1 mg  1 mg IntraMUSCular PRN Fede Spence MD        dextrose (D50W) injection syrg 12.5-25 g  25-50 mL IntraVENous PRN Fede Spence MD             Review of Systems:  HEENT: No epistaxis, no nasal drainage, no difficulty in swallowing, no redness in eyes  Respiratory: as above  Cardiovascular: no chest pain, no palpitations, no chronic leg edema, no syncope  Gastrointestinal: no abd pain, no vomiting, no diarrhea, no bleeding symptoms  Genitourinary: No urinary symptoms or hematuria  Integument/breast: No ulcers or rashes  Musculoskeletal:Neg  Neurological: No focal weakness, no seizures, no headaches  Behvioral/Psych: No anxiety, no depression  Constitutional: No fever, no chills, no weight loss, no night sweats     Objective:     Visit Vitals    /85 (BP 1 Location: Right arm, BP Patient Position: At rest)    Pulse 90    Temp 96.9 °F (36.1 °C)    Resp 21    Ht 5' 3\" (1.6 m)    Wt 114.3 kg (252 lb)    SpO2 97%    BMI 44.64 kg/m2        Physical Exam:   GENERAL: well developed and in  moderate distress  HEENT:  PERRL, EOMI, no alar flaring or epistaxis, oral mucosa moist without cyanosis  NECK:  no jugular vein distention, no retractions, no thyromegaly or masses  LUNGS:  wheezes bilaterally  HEART:  Regular rate and rhythm with no M,G,R; no edema is present  ABDOMEN:  soft with no tenderness, bowel sounds present  EXTREMITIES:  warm with no cyanosis  SKIN:  no jaundice or ecchymosis  NEUROLOGIC:  alert and oriented, grossly non-focal    Data review:     Blood Eosinophils: no eosinophilia  IgE level: 157  Allergy testing:negative  [facility] allergen panel:negative  Sputum eosinophils: not done    PFT:  Date FEV1/FVC FEV1%Best CEY85-01% FVC%best TLC% RV% VC% DLCO%   5/27/2016 54 1.32/51% 0.42/17% 2.54/77% 111 202 62 69                Methacholine challenge: not done    Imaging:  I have personally reviewed the patients radiographs and have reviewed the reports:  XR Results (most recent):    Results from Hospital Encounter encounter on 10/07/18   XR CHEST PA LAT   Narrative EXAM:  XR CHEST PA LAT    INDICATION:   SOB    COMPARISON: None. FINDINGS:  Stable mild enlargement of the cardiac silhouette. Pulmonary vasculature  unremarkable. No pneumothorax or pleural effusions. Streaky opacities in the  bases. Mild thoracic spondylosis. .         Impression Impression:    Stable mild enlargement of the cardiac silhouette. Basilar streaky opacities  suggesting atelectasis versus less likely infiltrate. IMPRESSION:   · Asthma- COPD overlap syndrome- difficult to control T2 low. Severity- moderate with poor Control. Has had several hospitalizations over the past year for COPD-Asthma exacerbations (this is the 4th this year) -Admitted for acute exacerbation failed outpatient therapy with oral steroids. No evidence for infection and has completed course of appropriate antibiotics. Triggers:environmental allergens. ? 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 EVMS- ENT following  · Comorbid conditions- Obesity, DM           · Will optimize in hospital treatment with nebulized bronchodilators, steroids and stepped up Bronchial hygiene protocol  · Use multi targeted trigger optimization strategy  · Consider chronic Macrolide therapy   · IgE, allergen profile reviewed.  Not a candidate for biologics  · Trigger control- rhinitis, GERD, environmental allergens  · Continue Flonase  · Discussed concept of controllers, rescue  · Advair and Incruse Ellipta can be resumed at discharge  · Rescue inhaler-albuterol  · Peak flow monitoring  · Action plan given with discussion about Green, Yellow, Red zone actions  · Oxygen and CPAP for home use  · Preventive vaccinations: Influenza, Pneumovax  · Will follow       Health maintenance screens deferred to Primary care provider. High complexity decision making was performed during the evaluation of this patient at high risk for decompensation with multiple organ involvement     Above mentioned total time spent on reviewing the case/medical record/data/notes/EMR/patient examination/documentation/coordinating care with nurse/consultants, exclusive of procedures with complex decision making performed and > 50% time spent in face to face evaluation.      Troy Tate MD

## 2018-10-08 NOTE — H&P
Admission History and Physical  [facility]      Patient: Juan Bahena MRN: [id number]  CSN: [id number]    YOB: 1960  Age: 60 y.o. Sex: female      DOA: 10/7/2018       HPI:     Juan Bahena is a 60 y.o. female with PMH 60 y.o. female with PMH COPD on home O2, HTN, DM, Diastolic CHF, SHERRIE now admitted with worsening sob and cough. Patient reports several days of worsening SOB. Says she started feeling bad on Friday with sob and cough. She states she her breathing became worse and she was unable to go from the couch to the kitchen which she is normally able to. She is on home O2 of 2L. Patient was recently seen at [facility] for worsening COPD and had medications optimized and started on prednisone taper. Patient reports adherence with COPD medications but states she sometimes does not take her lisinopril if her BPs are ok. She reports elevated -190/100s in the past several days. Pt reports cough that started several days ago that is productive of yellow sputum. She denies fevers or chills. Notes that family members were sick approximately one month ago. Patient sleeps in a recliner or with 4 pillows at night usually. She states overall feels like her \"body is tense\" and says sometimes has leg swelling. ED Course: Started on duo-nebs, given methylprednisolone 125 mg IV, 2 mg mg IV, and started on supplemental oxygen. ABG pH 7.47 PCO2 40.4, PO2 149, HCO3 29.7. Troponin's neg x1. CXR pending read. Review of Systems  Constitutional: Negative for fever, chills   HENT: Negative for hearing loss   Eyes: negative for vision loss  Respiratory: positive for cough, hemoptysis  Cardiovascular: positive for chest tightness   Gastrointestinal: positive for nausea.  Negative for vomiting, negative for diarrhea or constipation  Genitourinary: Negative for dysuria    Skin: Negative for rash  Neurological: positive for headaches  Psychiatric: Negative for depression .        Past Medical History:   Diagnosis Date    Asthma     Chronic lung disease     COPD     Cystocele, midline     Diabetes mellitus (HCC)     GERD (gastroesophageal reflux disease)     Hidradenitis suppurativa     Hyperlipidemia     Hypertension     SHERRIE on CPAP     CPAP    Stress incontinence        Past Surgical History:   Procedure Laterality Date    BREAST SURGERY PROCEDURE UNLISTED      Right breast benign tumor removal    HX APPENDECTOMY      HX CHOLECYSTECTOMY      HX DILATION AND CURETTAGE      Dysfunctional uterine bleeding, thought 2/2 fibroids    HX TUBAL LIGATION         Family History   Problem Relation Age of Onset    Hypertension Mother     Stroke Mother     Breast Cancer Mother      Bilateral mastectomies    Cancer Mother      ovarian and breast    Heart Failure Mother     Heart Attack Father      2011    Heart Surgery Father      CABG    Heart Failure Father     COPD Sister      Heavy smoker    Cancer Sister      ovarian    Heart Failure Sister     Lung Disease Sister     Asthma Child     Cancer Maternal Aunt      pancreatic    Cancer Maternal Grandfather      stomach       Social History     Social History    Marital status: LEGALLY      Spouse name: N/A    Number of children: N/A    Years of education: N/A     Social History Main Topics    Smoking status: Former Smoker     Packs/day: 1.00     Years: 30.00     Types: Cigarettes     Start date: 5/6/1966     Quit date: 9/6/2006    Smokeless tobacco: Never Used      Comment: 1-1.5 packs per day    Alcohol use No    Drug use: No    Sexual activity: No     Other Topics Concern    Not on file     Social History Narrative       Allergies   Allergen Reactions    Ancef [Cefazolin] Hives    Contrast Agent [Iodine] Anaphylaxis, Shortness of Breath and Swelling     Needs pre-medication for IV contrast with Benadryl, Solu-Medrol    Fish Containing Products Anaphylaxis     Pt states she had a severe allergic reaction at 9 y/o.  Metformin Other (comments)     Abdominal pain, diarrhea.  Codeine Other (comments)     Altered mental status       Prior to Admission Medications   Prescriptions Last Dose Informant Patient Reported? Taking? NOVOLOG FLEXPEN U-100 INSULIN 100 unit/mL inpn 10/7/2018 at Unknown time  No Yes   Sig: Continue home Sliding scale insulin as prior to admission   OXYGEN-AIR DELIVERY SYSTEMS 10/7/2018 at Unknown time  Yes Yes   Si L by Nasal route continuous. First Choice   albuterol (PROVENTIL HFA, VENTOLIN HFA, PROAIR HFA) 90 mcg/actuation inhaler 10/7/2018 at Unknown time  No Yes   Sig: Take 2 Puffs by inhalation every four (4) hours as needed for Wheezing. For the next 2 days after hospital discharge, use your inhaler 4 times a day. albuterol (PROVENTIL VENTOLIN) 2.5 mg /3 mL (0.083 %) nebulizer solution 10/7/2018 at Unknown time  No Yes   Sig: 3 mL by Nebulization route every four (4) hours as needed for Wheezing. Indications: BRONCHOSPASM PREVENTION, Chronic Obstructive Pulmonary Disease   albuterol sulfate 90 mcg/actuation aepb Unknown at Unknown time  No No   Sig: Take 1 Puff by inhalation every four (4) hours. aspirin delayed-release 81 mg tablet 2018 at Unknown time  Yes Yes   Sig: Take 81 mg by mouth daily. cpap machine kit 10/7/2018 at Unknown time  Yes Yes   Sig: by Does Not Apply route nightly. famotidine (PEPCID) 20 mg tablet 10/7/2018 at Unknown time  Yes Yes   Sig: Take 20 mg by mouth two (2) times daily as needed. fluticasone (FLONASE) 50 mcg/actuation nasal spray 10/7/2018 at Unknown time  Yes Yes   Si Sprays by Both Nostrils route daily. fluticasone-salmeterol (ADVAIR DISKUS) 100-50 mcg/dose diskus inhaler 2018 at Unknown time  No Yes   Sig: Take 1 Puff by inhalation every twelve (12) hours. insulin glargine (LANTUS,BASAGLAR) 100 unit/mL (3 mL) inpn 10/7/2018 at Unknown time  No Yes   Si Units by SubCUTAneous route nightly. Indications: type 2 diabetes mellitus   ipratropium (ATROVENT) 0.02 % soln 10/7/2018 at Unknown time  No Yes   Si.5 mL by Nebulization route four (4) times daily as needed. Indications: BRONCHOSPASM PREVENTION WITH COPD   lisinopril (PRINIVIL, ZESTRIL) 40 mg tablet 10/7/2018 at Unknown time  Yes Yes   Sig: Take 20 mg by mouth two (2) times a day. Indications: hypertension   montelukast (SINGULAIR) 10 mg tablet 10/7/2018 at Unknown time  Yes Yes   Sig: Take 10 mg by mouth nightly. omeprazole (PRILOSEC) 40 mg capsule 10/7/2018 at Unknown time  Yes Yes   Sig: Take 40 mg by mouth daily. Indications: gastroesophageal reflux disease   predniSONE (STERAPRED DS) 10 mg dose pack 10/7/2018 at Unknown time  No Yes   Sig: As per pack   simethicone (MYLICON) 80 mg chewable tablet 10/7/2018 at Unknown time  Yes Yes   Sig: Take 80 mg by mouth every six (6) hours as needed for Flatulence. umeclidinium (INCRUSE ELLIPTA) 62.5 mcg/actuation inhaler 2018 at Unknown time  No Yes   Sig: Take 1 Puff by inhalation daily. Indications: BRONCHOSPASM PREVENTION WITH COPD      Facility-Administered Medications: None       Physical Exam:     Patient Vitals for the past 24 hrs:   Temp Pulse Resp BP SpO2   10/07/18 2030 -  23 151/79 95 %   10/07/18 2015 - 90 25 147/72 98 %   10/07/18 2000 - 89 29 143/67 97 %   10/07/18 194 22 158/71 96 %   10/07/18 193 -  24 153/69 96 %   10/07/18 191 -  25 150/68 95 %   10/07/18 181 -  20 135/71 95 %   10/07/18 1800 - 82 24 154/74 98 %   10/07/18 174 -  20 142/83 97 %   10/07/18 173 -  24 162/78 97 %   10/07/18 1715 - 82 22 193/85 100 %   10/07/18 170 -  - 185/89 100 %   10/07/18 1634 96.7 °F (35.9 °C) 88 22 (!) 173/94 96 %       Physical Exam:   General:  Mild-moderate respiratory distress, stopping to take a breath every 3-4 words  HEENT: Conjunctiva pink. EOMI. Pharynx moist, nonerythematous. Moist mucous membranes. Thyroid not enlarged, no nodules.   No cervical, supraclavicular lymphadenopathy. Increased fatty neck tissue. CV:  RRR. No murmurs, rubs, or gallops appreciated. No JVD  RESP: dyspneic, no accessory muscle usage. Diminished breath sounds b/l, prolonged expiratory phase. No wheeze, rales, or rhonchi. ABD:  +BS, Soft, nontender, nondistended. No hepatosplenomegaly. No suprapubic tenderness. Neuro: PERRL, appropriate affect   Ext:  No edema. 2+ dp pulses bilaterally. No calf tenderness, swelling, or erythema   Skin:  No rashes, lesions, or ulcers. No cyanosis. IMAGING: No results found. Recent Results (from the past 24 hour(s))   EKG, 12 LEAD, INITIAL    Collection Time: 10/07/18  4:47 PM   Result Value Ref Range    Ventricular Rate 79 BPM    Atrial Rate 79 BPM    P-R Interval 150 ms    QRS Duration 90 ms    Q-T Interval 390 ms    QTC Calculation (Bezet) 447 ms    Calculated P Axis 56 degrees    Calculated R Axis 32 degrees    Calculated T Axis 46 degrees    Diagnosis       Normal sinus rhythm  Cannot rule out Anterior infarct , age undetermined  Abnormal ECG  When compared with ECG of 25-AUG-2018 15:56,  No significant change was found     CBC WITH AUTOMATED DIFF    Collection Time: 10/07/18  5:05 PM   Result Value Ref Range    WBC 5.8 4.6 - 13.2 K/uL    RBC 4.34 4.20 - 5.30 M/uL    HGB 13.3 12.0 - 16.0 g/dL    HCT 37.2 35.0 - 45.0 %    MCV 85.7 74.0 - 97.0 FL    MCH 30.6 24.0 - 34.0 PG    MCHC 35.8 31.0 - 37.0 g/dL    RDW 13.5 11.6 - 14.5 %    PLATELET 526 964 - 476 K/uL    MPV 8.8 (L) 9.2 - 11.8 FL    NEUTROPHILS 59 40 - 73 %    LYMPHOCYTES 33 21 - 52 %    MONOCYTES 5 3 - 10 %    EOSINOPHILS 3 0 - 5 %    BASOPHILS 0 0 - 2 %    ABS. NEUTROPHILS 3.4 1.8 - 8.0 K/UL    ABS. LYMPHOCYTES 1.9 0.9 - 3.6 K/UL    ABS. MONOCYTES 0.3 0.05 - 1.2 K/UL    ABS. EOSINOPHILS 0.2 0.0 - 0.4 K/UL    ABS.  BASOPHILS 0.0 0.0 - 0.1 K/UL    DF AUTOMATED     METABOLIC PANEL, BASIC    Collection Time: 10/07/18  5:05 PM   Result Value Ref Range    Sodium 139 136 - 145 mmol/L Potassium 3.8 3.5 - 5.5 mmol/L    Chloride 104 100 - 108 mmol/L    CO2 28 21 - 32 mmol/L    Anion gap 7 3.0 - 18 mmol/L    Glucose 193 (H) 74 - 99 mg/dL    BUN 10 7.0 - 18 MG/DL    Creatinine 0.87 0.6 - 1.3 MG/DL    BUN/Creatinine ratio 11 (L) 12 - 20      GFR est AA >60 >60 ml/min/1.73m2    GFR est non-AA >60 >60 ml/min/1.73m2    Calcium 8.9 8.5 - 10.1 MG/DL   CARDIAC PANEL,(CK, CKMB & TROPONIN)    Collection Time: 10/07/18  5:05 PM   Result Value Ref Range     26 - 192 U/L    CK - MB 1.3 <3.6 ng/ml    CK-MB Index 1.2 0.0 - 4.0 %    Troponin-I, Qt. <0.02 0.0 - 0.045 NG/ML   NT-PRO BNP    Collection Time: 10/07/18  5:05 PM   Result Value Ref Range    NT pro-BNP 46 0 - 900 PG/ML   POC G3    Collection Time: 10/07/18  9:03 PM   Result Value Ref Range    Device: NASAL CANNULA      Flow rate (POC) 3 L/M    FIO2 (POC) 32 %    pH (POC) 7.474 (H) 7.35 - 7.45      pCO2 (POC) 40.4 35.0 - 45.0 MMHG    pO2 (POC) 149 (H) 80 - 100 MMHG    HCO3 (POC) 29.7 (H) 22 - 26 MMOL/L    sO2 (POC) 99 (H) 92 - 97 %    Base excess (POC) 6 mmol/L    Allens test (POC) YES      Total resp. rate 23      Site LEFT RADIAL      Patient temp. 98.6      Specimen type (POC) ARTERIAL      Performed by Pedr oPablo Castro          Assessment/Plan:   60 y.o. female with PMH COPD on home O2, HTN, DM, Diastolic CHF, SHERRIE now admitted with worsening sob and cough. COPD exacerbation/productive cough/hemoptysis  Likely in setting of medication non-adherence, with elevated BP vs infectious with cough productive of yellow mucous. Hemoptysis per patient. ABG pH 7.47 PCO2 40.4, PO2 149, HCO3 29.7. EKG NSR and unchanged from prior study. Troponin's neg x1. Satting 95% on home O2. Recently seen in PF and started duonebs and prednisone taper prior to admission.  Home regimen usually consists of albuterol, incruse, advair, and Singulair.   - admit to telemetry   - duonebs q4h  - methylprednisolone 60 mg q6hr   - Levaquin 750 mg daily  - continue supplemental O2   - continue Singulair   - continue home flonase  - CPAP at night   - sputum cultures  - FU CXR   - daily BMP, CBC  - consult pulm in am     Diastolic CHF  Stable on exam, no JVD, no peripheral edema. BNP 46. SOB and orthopnea likely in setting of current COPD exacerbation. - admit to telemetry   - continue aspirin   - continue lisinopril     HTN   BP elevated to 319O systolic on admission. Improved to 150/79.   - continue home lisinopril 40 mg daily     DM   - continue glargine 30 U nightly   - SSI     SHERRIE  - CPAP at night     GERD  - continue home Protonix and pepcid, and simethicone     Diet: Diabetic  DVT Prophylaxis: lovenox  Code Status: Full  Point of Contact: Sue Hyatt (daughter) [phone]    Disposition and anticipated LOS: 2 midnights     Evgeny Oconnor MD PGY-1  [facility]     [facility] Medicine  Admission History and Physical (Senior Addendum)  Date of Admission: 10/7/2018  Medical Record Number: [id number]     Patient: Ton Perez  YOB: 1960   Age: 60 y.o. Sex: female   DOA: 10/7/2018     History of Present Illness  Cornelius Busby is a 60 y.o. female with PMHx of advanced COPD (with four hospitalizations this year), asthma, insulin dependent DM, hypertension and GERD who presented on 10/7/18 with worsening dyspnea.      Patient was treated by me in our office on 9/28/18 for a mild COPD exacerbation. Her symptoms initially improved with the PO Prednisone taper she was started on but worsened two nights ago on 10/5/18. Patient stated that her symptoms significantly deteriorated after she started to lower the Prednisone taper down to 10 mg daily. She stated that her chest was feeling tight and that her breathing became worse which prompted her to come to the ER.     Brief ROS: No fevers or chills. She endorsed chest tightness and symptoms of a racing heart. She endorsed SOB and a minimally productive cough.  Endorsed slight nausea but no vomiting.     ED Course:  -Duoneb x 2; Albuterol neb x1  -Mag 2g  -NS 500cc bolus  -Solumedrol 125mg  -CBC, BMP, Cardiac Biomarkers, BNP  -CXR and EKG     Full review of systems, past medical history, surgical history, social history, allergy list and prior to admissions medications per intern's note above     Physical Examination  Patient Vitals for the past 24 hrs:    Temp Pulse Resp BP SpO2   10/07/18 2015 - 90 25 147/72 98 %   10/07/18 2000 - 89 29 143/67 97 %   10/07/18 1945 - 93 22 158/71 96 %   10/07/18 1930 - 85 24 153/69 96 %   10/07/18 1915 - 85 25 150/68 95 %   10/07/18 1815 - 86 20 135/71 95 %   10/07/18 1800 - 82 24 154/74 98 %   10/07/18 1745 - 83 20 142/83 97 %   10/07/18 1730 - 86 24 162/78 97 %   10/07/18 1715 - 82 22 193/85 100 %   10/07/18 1700 - 85 - 185/89 100 %   10/07/18 1634 96.7 °F (35.9 °C) 88 22 (!) 173/94 96 %      General: Appears to be in moderate distress due to dyspnea. Vitals as above  HEENT: Mucous membranes moist. No cervical LAD. No icterus noted  CV: Tachycardic but regular rhythm. No obvious murmurs  Resp: Dyspneic (cannot speak in full sentences and can speak only 3-4 words at a time). No coughing but has audible wheezes anteriorly and posteriorly (diffusely). No crackles, rhonchi or rales appreciated  Abd: NABS. NDNT  Rectal: Deferred  MS: Appears to be able to move all 4 extremities  Neuro: PERRLA. Tongue was midline  Ext: No obvious LE edema. Radial and DP pulses 1+ bilaterally  Psych: Appropriate affect. Responds to questions appropriately     Pertinent Labs  -Glc 193  -ABG: pH 7.474, pCO2 40.4, pO2 149, HCO3 29.7 and sO2 99% on 3L NC     Pertinent Imaging  -CXR: Slight flattening of the diaphragm but no obvious infiltrate  -EKG: NSR.  No significant ST elevations or depressions        Assessment and Plan  Cy Craig is a 60 y.o. female with PMHx of advanced COPD (with four hospitalizations this year), asthma, insulin dependent DM, chronic diastolic heart failure (compensated) hypertension and GERD who presented on 10/7/18 with worsening dyspnea now being admitted for a COPD exacerbation.     Moderate to Severe COPD Exacerbation: Last FEV1/FVC was 0.56 (71% predicted) with a DLCO that was 44% predicted in 11/2016. Was treated in the outpatient setting on 9/28 for a COPD exacerbation. Initially did well but worsened a few days ago (on 10/5/18) when Prednisone was tapered down. Received 2 duo-neb treatments in the ER with 2g IV Magnesium and Methylprednisolone 125mg IV. CXR demonstrates no obvious infiltrates and EKG demonstrates no obvious cardiac abnormalities at this time. ABG demonstrated mild metabolic alkalosis (pH 9.748, pCO2 40.4, pO2 149, HCO3 29.7 and sO2 99% on 3L NC). Of note, this is patient's fourth COPD exacerbation requiring admission this calendar year. Last saw Pulmonary in 07/2018 following her last admission. She is to be on supplemental O2 at 2L/day with all activity. 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 ). In the past she used to be on Spiriva (in place of her current Incruse Ellipita) and patient felt that the switch to Incruse worsened her symptoms (but due to recent insurance reasons, she wasn't able to obtain Spiriva anymore). Of note, she was a former tobacco user. -IV Methylprednisolone 60mg q6 hours (can titrate this lower as she clinically improves)  -Continue Duo-Nebs q4 hours (scheduled) for the next 24 hours  -IV Levaquin 750mg q24 hours for 7 days  -Pulmonary consultation in the morning  -Sputum cultures   -Hold home Incruse Ellipita (umeclidinium) as well as Advair (fluticasone/salmetorol) while on duo-nebs     Chest Tightness: Likely related to COPD exacerbation. Normal cardiac biomarkers x 1 and EKG demonstrating no acute ST changes. Normal BNP.   -Cardiac monitoring     Chronic Diastolic Heart Failure with History of Heart Palpitations and Hyperlipidemia: Currently compensated. Last echo in 07/2018 demonstrated LVEf of 66-70% with mild concentric hypertrophy. She had an event monitor in 06/2018 that demonstrated no significant arrhythmias. Patient declined statin therapy a few months ago. Dr. Jake Bosworth is cardiologist  -Continue baby Aspirin qD  -Continue home Lisinopril 40mg qD     Hypertension: Has had hypertensive range blood pressures in the past. BP elevated in the ER.   -Continue to monitor BPs throughout admission  -Continue home Lisinopril 40mg qD     Insulin Dependent DM: Last A1c in 07/2018 was 7.9%. Has intolerance to Metformin  -Continue home (Basaglar/Lantus/Glargine) 30 units qHS  -Hold home sliding scale insulin (Novolog Flexpen/Aspart) and [facility] SSI     Seasonal Allergies:  Thought to be contributory to her COPD symptoms  -Continue home Singulair 10mg qHS  -Continue home Flonase qD     GERD  -Continue home Omeprazole/Pantoprazole 40mg qD  -Continue home Famotidine 20mg BID     Remainder of management per Dr. Collette Gander note above     Diet/Nutrition: Diabetic  DVT Prophylaxis: Lovenox  Code Status: Full     Jovani Marroquin MD, PGY-3  [facility] Medicine  October 7, 2018 9:31 PM

## 2018-10-09 LAB
ANION GAP SERPL CALC-SCNC: 8 MMOL/L (ref 3–18)
BASOPHILS # BLD: 0 K/UL (ref 0–0.1)
BASOPHILS NFR BLD: 0 % (ref 0–2)
BUN SERPL-MCNC: 20 MG/DL (ref 7–18)
BUN/CREAT SERPL: 19 (ref 12–20)
CALCIUM SERPL-MCNC: 8.9 MG/DL (ref 8.5–10.1)
CHLORIDE SERPL-SCNC: 103 MMOL/L (ref 100–108)
CO2 SERPL-SCNC: 25 MMOL/L (ref 21–32)
CREAT SERPL-MCNC: 1.05 MG/DL (ref 0.6–1.3)
DIFFERENTIAL METHOD BLD: ABNORMAL
EOSINOPHIL # BLD: 0 K/UL (ref 0–0.4)
EOSINOPHIL NFR BLD: 0 % (ref 0–5)
ERYTHROCYTE [DISTWIDTH] IN BLOOD BY AUTOMATED COUNT: 13.8 % (ref 11.6–14.5)
GLUCOSE BLD STRIP.AUTO-MCNC: 281 MG/DL (ref 70–110)
GLUCOSE BLD STRIP.AUTO-MCNC: 297 MG/DL (ref 70–110)
GLUCOSE BLD STRIP.AUTO-MCNC: 308 MG/DL (ref 70–110)
GLUCOSE BLD STRIP.AUTO-MCNC: 333 MG/DL (ref 70–110)
GLUCOSE BLD STRIP.AUTO-MCNC: 355 MG/DL (ref 70–110)
GLUCOSE BLD STRIP.AUTO-MCNC: 369 MG/DL (ref 70–110)
GLUCOSE SERPL-MCNC: 297 MG/DL (ref 74–99)
HCT VFR BLD AUTO: 34.9 % (ref 35–45)
HGB BLD-MCNC: 11.7 G/DL (ref 12–16)
LYMPHOCYTES # BLD: 0.9 K/UL (ref 0.9–3.6)
LYMPHOCYTES NFR BLD: 6 % (ref 21–52)
MCH RBC QN AUTO: 29.8 PG (ref 24–34)
MCHC RBC AUTO-ENTMCNC: 33.5 G/DL (ref 31–37)
MCV RBC AUTO: 89 FL (ref 74–97)
MONOCYTES # BLD: 0.5 K/UL (ref 0.05–1.2)
MONOCYTES NFR BLD: 3 % (ref 3–10)
NEUTS SEG # BLD: 12.7 K/UL (ref 1.8–8)
NEUTS SEG NFR BLD: 91 % (ref 40–73)
PLATELET # BLD AUTO: 283 K/UL (ref 135–420)
PMV BLD AUTO: 9.1 FL (ref 9.2–11.8)
POTASSIUM SERPL-SCNC: 4.2 MMOL/L (ref 3.5–5.5)
RBC # BLD AUTO: 3.92 M/UL (ref 4.2–5.3)
SODIUM SERPL-SCNC: 136 MMOL/L (ref 136–145)
WBC # BLD AUTO: 14 K/UL (ref 4.6–13.2)

## 2018-10-09 PROCEDURE — 94660 CPAP INITIATION&MGMT: CPT

## 2018-10-09 PROCEDURE — 74011000250 HC RX REV CODE- 250: Performed by: INTERNAL MEDICINE

## 2018-10-09 PROCEDURE — 74011250637 HC RX REV CODE- 250/637: Performed by: INTERNAL MEDICINE

## 2018-10-09 PROCEDURE — 74011250636 HC RX REV CODE- 250/636: Performed by: STUDENT IN AN ORGANIZED HEALTH CARE EDUCATION/TRAINING PROGRAM

## 2018-10-09 PROCEDURE — 74011636637 HC RX REV CODE- 636/637: Performed by: STUDENT IN AN ORGANIZED HEALTH CARE EDUCATION/TRAINING PROGRAM

## 2018-10-09 PROCEDURE — 74011250637 HC RX REV CODE- 250/637: Performed by: STUDENT IN AN ORGANIZED HEALTH CARE EDUCATION/TRAINING PROGRAM

## 2018-10-09 PROCEDURE — 85025 COMPLETE CBC W/AUTO DIFF WBC: CPT

## 2018-10-09 PROCEDURE — 74011250637 HC RX REV CODE- 250/637: Performed by: FAMILY MEDICINE

## 2018-10-09 PROCEDURE — 65660000000 HC RM CCU STEPDOWN

## 2018-10-09 PROCEDURE — 94640 AIRWAY INHALATION TREATMENT: CPT

## 2018-10-09 PROCEDURE — 74011636637 HC RX REV CODE- 636/637: Performed by: FAMILY MEDICINE

## 2018-10-09 PROCEDURE — 77010033678 HC OXYGEN DAILY

## 2018-10-09 PROCEDURE — 36415 COLL VENOUS BLD VENIPUNCTURE: CPT

## 2018-10-09 PROCEDURE — 80048 BASIC METABOLIC PNL TOTAL CA: CPT

## 2018-10-09 PROCEDURE — 82962 GLUCOSE BLOOD TEST: CPT

## 2018-10-09 RX ORDER — INSULIN GLARGINE 100 [IU]/ML
15 INJECTION, SOLUTION SUBCUTANEOUS ONCE
Status: COMPLETED | OUTPATIENT
Start: 2018-10-09 | End: 2018-10-09

## 2018-10-09 RX ORDER — LISINOPRIL 20 MG/1
20 TABLET ORAL
Status: DISCONTINUED | OUTPATIENT
Start: 2018-10-09 | End: 2018-10-11 | Stop reason: HOSPADM

## 2018-10-09 RX ADMIN — ENOXAPARIN SODIUM 40 MG: 100 INJECTION SUBCUTANEOUS at 08:44

## 2018-10-09 RX ADMIN — TIOTROPIUM BROMIDE 18 MCG: 18 CAPSULE ORAL; RESPIRATORY (INHALATION) at 07:07

## 2018-10-09 RX ADMIN — ASPIRIN 81 MG: 81 TABLET, COATED ORAL at 08:46

## 2018-10-09 RX ADMIN — INSULIN GLARGINE 15 UNITS: 100 INJECTION, SOLUTION SUBCUTANEOUS at 08:43

## 2018-10-09 RX ADMIN — FLUTICASONE PROPIONATE 2 SPRAY: 50 SPRAY, METERED NASAL at 08:46

## 2018-10-09 RX ADMIN — METHYLPREDNISOLONE SODIUM SUCCINATE 60 MG: 125 INJECTION, POWDER, FOR SOLUTION INTRAMUSCULAR; INTRAVENOUS at 05:24

## 2018-10-09 RX ADMIN — INSULIN LISPRO 15 UNITS: 100 INJECTION, SOLUTION INTRAVENOUS; SUBCUTANEOUS at 21:18

## 2018-10-09 RX ADMIN — ARFORMOTEROL TARTRATE 15 MCG: 15 SOLUTION RESPIRATORY (INHALATION) at 07:07

## 2018-10-09 RX ADMIN — MONTELUKAST SODIUM 10 MG: 10 TABLET, COATED ORAL at 21:18

## 2018-10-09 RX ADMIN — BUDESONIDE 1000 MCG: 1 SUSPENSION RESPIRATORY (INHALATION) at 07:07

## 2018-10-09 RX ADMIN — PANTOPRAZOLE SODIUM 40 MG: 40 TABLET, DELAYED RELEASE ORAL at 08:45

## 2018-10-09 RX ADMIN — FAMOTIDINE 20 MG: 20 TABLET ORAL at 08:46

## 2018-10-09 RX ADMIN — ARFORMOTEROL TARTRATE 15 MCG: 15 SOLUTION RESPIRATORY (INHALATION) at 20:17

## 2018-10-09 RX ADMIN — BUDESONIDE 1000 MCG: 1 SUSPENSION RESPIRATORY (INHALATION) at 20:17

## 2018-10-09 RX ADMIN — INSULIN LISPRO 9 UNITS: 100 INJECTION, SOLUTION INTRAVENOUS; SUBCUTANEOUS at 12:43

## 2018-10-09 RX ADMIN — INSULIN GLARGINE 30 UNITS: 100 INJECTION, SOLUTION SUBCUTANEOUS at 21:18

## 2018-10-09 RX ADMIN — LISINOPRIL 20 MG: 40 TABLET ORAL at 08:46

## 2018-10-09 RX ADMIN — INSULIN LISPRO 9 UNITS: 100 INJECTION, SOLUTION INTRAVENOUS; SUBCUTANEOUS at 08:43

## 2018-10-09 RX ADMIN — METHYLPREDNISOLONE SODIUM SUCCINATE 60 MG: 125 INJECTION, POWDER, FOR SOLUTION INTRAMUSCULAR; INTRAVENOUS at 19:07

## 2018-10-09 RX ADMIN — LISINOPRIL 20 MG: 20 TABLET ORAL at 21:18

## 2018-10-09 RX ADMIN — METHYLPREDNISOLONE SODIUM SUCCINATE 60 MG: 125 INJECTION, POWDER, FOR SOLUTION INTRAMUSCULAR; INTRAVENOUS at 12:44

## 2018-10-09 RX ADMIN — INSULIN LISPRO 12 UNITS: 100 INJECTION, SOLUTION INTRAVENOUS; SUBCUTANEOUS at 16:57

## 2018-10-09 RX ADMIN — FAMOTIDINE 20 MG: 20 TABLET ORAL at 19:08

## 2018-10-09 NOTE — PROGRESS NOTES
LOC: alert x 4    Vitals: wnl, lungs sounds diminished, on 02 @ 2 LPM via NC      IV: 20 to R AC, patent       Intake: significant PO intake       Output: voiding well, clear yellow urine      Changes: no changes   (Blood sugars remain high, currently on sliding scale, and lantus, but is also receiving solu-medrol )

## 2018-10-09 NOTE — ROUTINE PROCESS
Bedside and Verbal shift change report given to Genesis FR[address], RN (oncoming nurse) by Ann Grider RN (offgoing nurse). Report included the following information SBAR, Kardex, MAR and Recent Results.     SITUATION:    Code Status: Full Code   Reason for Admission: Acute exacerbation of chronic obstructive pulmonary disease (COPD) [facility]   COPD exacerbation [facility] [address] [facility]     Hospital day: 2   Problem List:       Hospital Problems  Date Reviewed: 7/30/2018          Codes Class Noted POA    * (Principal)Acute exacerbation of chronic obstructive pulmonary disease (COPD) ([facility] [address] ICD-10-CM: J44.1  ICD-9-CM: 491.21  10/7/2018 Unknown        COPD exacerbation ([facility] [address] ICD-10-CM: J44.1  ICD-9-CM: 491.21  2/8/2017 Unknown              BACKGROUND:    Past Medical History:   Past Medical History:   Diagnosis Date    Asthma     Chronic lung disease     COPD     Cystocele, midline     Diabetes mellitus ([facility] [address] [facility]     GERD (gastroesophageal reflux disease)     Hidradenitis suppurativa     Hyperlipidemia     Hypertension     SHERRIE on CPAP     CPAP    Stress incontinence          Patient taking anticoagulants yes     ASSESSMENT:    Changes in Assessment Throughout Shift: no     Patient has Central Line: no Reasons if yes: n/a   Patient has Dean Cath: no Reasons if yes: n/a      Last Vitals:     Vitals:    10/09/18 0722 10/09/18 0728 10/09/18 1106 10/09/18 1520   BP: 121/68  122/75 123/71   Pulse: 77  79 70   Resp: 18 18 18   Temp: 97 °F (36.1 °C)  98 °F (36.7 °C) 96.7 °F (35.9 °C)   SpO2: 98% 98% 98% 97%   Weight:       Height:            IV and DRAINS (will only show if present)   Peripheral IV 10/07/18 Right Antecubital-Site Assessment: Clean, dry, & intact     WOUND (if present)   Wound Type:  none   Dressing present Dressing Present : No   Wound Concerns/Notes:  none     PAIN    Pain Assessment    Pain Intensity 1: 0 (10/09/18 1657)              Patient Stated Pain Goal: 0  o Interventions for Pain:  none  o Intervention effective: n/a  o Time of last intervention: denies pain  o Reassessment Completed: yes      Last 3 Weights:  Last 3 Recorded Weights in this Encounter    10/07/18 1634   Weight: 114.3 kg (252 lb)     Weight change:      INTAKE/OUPUT    Current Shift:      Last three shifts: 10/08 0701 - 10/09 1900  In: 1560 [P.O.:1560]  Out: 1      LAB RESULTS     Recent Labs      10/09/18   0134  10/08/18   0343  10/07/18   1705   WBC  14.0*  8.6  5.8   HGB  11.7*  13.0  13.3   HCT  34.9*  37.7  37.2   PLT  283  265  250        Recent Labs      10/09/18   0134  10/08/18   0343  10/07/18   1705   NA  136  138  139   K  4.2  4.7  3.8   GLU  297*  279*  193*   BUN  20*  13  10   CREA  1.05  0.95  0.87   CA  8.9  9.2  8.9       RECOMMENDATIONS AND DISCHARGE PLANNING     1. Pending tests/procedures/ Plan of Care or Other Needs: continue steroids, oxygen, blood sugars     2. Discharge plan for patient and Needs/Barriers: Home    3. Estimated Discharge Date: TBD Posted on Whiteboard in Patients Room: yes      4. The patient's care plan was reviewed with the oncoming nurse. \"HEALS\" SAFETY CHECK      Fall Risk    Total Score: 1    Safety Measures: Safety Measures: Call light within reach, Gripper socks    A safety check occurred in the patient's room between off going nurse and oncoming nurse listed above.     The safety check included the below items  Area Items   H  High Alert Medications - Verify all high alert medication drips (heparin, PCA, etc.)   E  Equipment - Suction is set up for ALL patients (with yanker)  - Red plugs utilized for all equipment (IV pumps, etc.)  - WOWs wiped down at end of shift.  - Room stocked with oxygen, suction, and other unit-specific supplies   A  Alarms - Bed alarm is set for fall risk patients  - Ensure chair alarm is in place and activated if patient is up in a chair   L  Lines - Check IV for any infiltration  - Dean bag is empty if patient has a Dean   - Tubing and IV bags are labeled S  Safety   - Room is clean, patient is clean, and equipment is clean. - Hallways are clear from equipment besides carts. - Fall bracelet on for fall risk patients  - Ensure room is clear and free of clutter  - Suction is set up for ALL patients (with mario)  - Hallways are clear from equipment besides carts.    - Isolation precautions followed, supplies available outside room, sign posted     Clarice Naik RN

## 2018-10-09 NOTE — PROGRESS NOTES
Progress Note  [facility]       Patient: Sami Maldonado MRN: [id number]  CSN: [id number]    YOB: 1960  Age: 60 y.o. Sex: female    DOA: 10/7/2018 LOS:  LOS: 2 days   PCP: Jacoby Ludwig MD                 Subjective:     Acute events: no acute events. States that her use of the CPAP made a huge difference overnight. She said she was able to sleep for about 6 hours. She says her breathing is about \"20%\" better than the day before.        Brief ROS: positive for chest tightness, abdominal tenderness   SOB improved, denies chest pain    Objective:      Patient Vitals for the past 24 hrs:   Temp Pulse Resp BP SpO2   10/09/18 0728 - - - - 98 %   10/09/18 0722 97 °F (36.1 °C) 77 18 121/68 98 %   10/09/18 0715 - - - - 95 %   10/09/18 0708 - - - - 99 %   10/09/18 0449 97.8 °F (36.6 °C) 83 18 132/74 96 %   10/09/18 0015 97.5 °F (36.4 °C) 80 18 135/76 97 %   10/08/18 2355 - - - - 97 %   10/08/18 2055 - - - - 97 %   10/08/18 1939 - - - - 97 %   10/08/18 1938 97.4 °F (36.3 °C) 84 18 139/75 97 %   10/08/18 1603 97.1 °F (36.2 °C) 86 22 103/66 94 %   10/08/18 1144 97.5 °F (36.4 °C) 90 20 120/63 96 %       Physical Exam:   General: patient appears more comfortable  Cardiovascular: RRR w/o MRGs  Respiratory: diffuse wheezing, improved air movement, improved dyspnea  Abdomen: Soft, +BS, abdominal tenderness, Non-Distended  Extremities: no peripheral edema     Lab/Data Reviewed:  BMP:   Lab Results   Component Value Date/Time     10/09/2018 01:34 AM    K 4.2 10/09/2018 01:34 AM     10/09/2018 01:34 AM    CO2 25 10/09/2018 01:34 AM    AGAP 8 10/09/2018 01:34 AM     (H) 10/09/2018 01:34 AM    BUN 20 (H) 10/09/2018 01:34 AM    CREA 1.05 10/09/2018 01:34 AM    GFRAA >60 10/09/2018 01:34 AM    GFRNA 54 (L) 10/09/2018 01:34 AM     CMP:   Lab Results   Component Value Date/Time     10/09/2018 01:34 AM    K 4.2 10/09/2018 01:34 AM     10/09/2018 01:34 AM    CO2 25 10/09/2018 01:34 AM    AGAP 8 10/09/2018 01:34 AM     (H) 10/09/2018 01:34 AM    BUN 20 (H) 10/09/2018 01:34 AM    CREA 1.05 10/09/2018 01:34 AM    GFRAA >60 10/09/2018 01:34 AM    GFRNA 54 (L) 10/09/2018 01:34 AM    CA 8.9 10/09/2018 01:34 AM     CBC:   Lab Results   Component Value Date/Time    WBC 14.0 (H) 10/09/2018 01:34 AM    HGB 11.7 (L) 10/09/2018 01:34 AM    HCT 34.9 (L) 10/09/2018 01:34 AM     10/09/2018 01:34 AM        CBC w/Diff Recent Labs      10/09/18   0134  10/08/18   0343  10/07/18   1705   WBC  14.0*  8.6  5.8   RBC  3.92*  4.29  4.34   HGB  11.7*  13.0  13.3   HCT  34.9*  37.7  37.2   PLT  283  265  250   GRANS  91*  91*  59   LYMPH  6*  8*  33   EOS  0  0  3      Chemistry Recent Labs      10/09/18   0721  10/09/18   0134   10/08/18   0343   10/07/18   1705   GLUCPOC  281*   --    < >   --    < >   --    GLU   --   297*   --   279*   --   193*   NA   --   136   --   138   --   139   K   --   4.2   --   4.7   --   3.8   CL   --   103   --   102   --   104   CO2   --   25   --   24   --   28   BUN   --   20*   --   13   --   10   CREA   --   1.05   --   0.95   --   0.87   CA   --   8.9   --   9.2   --   8.9   AGAP   --   8   --   12   --   7   BUCR   --   19   --   14   --   11*    < > = values in this interval not displayed. Microbiology No results for input(s): SDES, CULT in the last 72 hours. No results for input(s): CULT in the last 72 hours.    I/O   Intake/Output Summary (Last 24 hours) at 10/09/18 0817  Last data filed at 10/09/18 0155   Gross per 24 hour   Intake             1440 ml   Output                0 ml   Net             1440 ml          Scheduled Medications Reviewed:  Current Facility-Administered Medications   Medication Dose Route Frequency    budesonide (PULMICORT) 1,000 mcg/2 mL nebulizer susp  1,000 mcg Nebulization BID RT    arformoterol (BROVANA) neb solution 15 mcg  15 mcg Nebulization BID RT    tiotropium (SPIRIVA) inhalation capsule 18 mcg  1 Cap Inhalation DAILY    aspirin delayed-release tablet 81 mg  81 mg Oral DAILY    famotidine (PEPCID) tablet 20 mg  20 mg Oral BID    lisinopril (PRINIVIL, ZESTRIL) tablet 40 mg  40 mg Oral DAILY    fluticasone (FLONASE) 50 mcg/actuation nasal spray 2 Spray  2 Spray Both Nostrils DAILY    pantoprazole (PROTONIX) tablet 40 mg  40 mg Oral DAILY    enoxaparin (LOVENOX) injection 40 mg  40 mg SubCUTAneous Q24H    montelukast (SINGULAIR) tablet 10 mg  10 mg Oral QHS    insulin glargine (LANTUS) injection 30 Units  30 Units SubCUTAneous QHS    methylPREDNISolone (PF) (SOLU-MEDROL) injection 60 mg  60 mg IntraVENous Q6H    levoFLOXacin (LEVAQUIN) 750 mg in D5W IVPB  750 mg IntraVENous Q24H    insulin lispro (HUMALOG) injection   SubCUTAneous AC&HS                    Assessment/Plan   60 y.o. female with PMH COPD on home O2, HTN, DM, Diastolic CHF, SHERRIE now admitted with worsening sob and cough. Now being treated for COPD exacerbation.      Asthma-COPD Overlap syndrome - Improving  -Continue Brovana and Pulmicort nebulizers  - methylprednisolone 60 mg q6hr   - Levaquin 750 mg daily  - continue supplemental O2   - continue Singulair   - continue home flonase  - CPAP at night   - daily BMP, CBC  - pulmonology consulted      Diastolic CHF  Stable on exam, no JVD, no peripheral edema. BNP 46. SOB and orthopnea likely in setting of current COPD exacerbation. Followed by Dr. Deuce Lai cardiology. Most recent echo 7/7/18 EF 66-70% with mild concentric LV hypertrophy. - continue telemetry   - continue aspirin   - continue lisinopril 40 mg daily      HTN   BP elevated to 144G systolic on admission. Improved to 140-150/70s  - continue home lisinopril 40 mg daily      DM -   Blood glucose elevated in setting of steroid use. - Will add another 15u dose of lantus once for today  - continue glargine 30 U nightly   - SSI      Frontal sinus osteoma  Found on CT scan of sinuses during admission in July 2018. Non-obstructing.  Patient followed by [facility]. Next appointment in November.    - continue outpatient follow up with ENT     SHERRIE  - CPAP at night      GERD  - continue home Protonix and pepcid, and simethicone      Diet: Diabetic  DVT Prophylaxis: lovenox  Code Status: Full  Point of Contact: Pedro Byrd (daughter) [phone]     Disposition and anticipated LOS: [address] [place] [address]   10/09/18 8:17 AM

## 2018-10-09 NOTE — PROGRESS NOTES
Patient A&o x 4, able to express need, RR even and unlabored,  Lung sounds diminished, on 02 @ 2LPM via NC, + bowel sounds, pulses palpable.

## 2018-10-09 NOTE — DIABETES MGMT
Diabetes Patient/Family Education Record Factors That  May Influence Patients Ability  to Learn or  Comply with Recommendations 
 []   Language barrier    []   Cultural needs   []   Motivation  
 []   Cognitive limitation    []   Physical   []   Education  
 []   Physiological factors   []   Hearing/vision/speaking impairment   []   Catholic beliefs []   Financial factors   []  Other:   []  No factors identified at this time. Person Instructed: 
 []   Patient   []   Family   []  Other Preference for Learning: 
 []   Verbal   []   Written   []  Demonstration Level of Comprehension & Competence:   
[]  Good                                      [] Fair                                     []  Poor                             []  Needs Reinforcement  
[]  Teachback completed Education Component:  
[]  Medication management, including how to administer insulin (if appropriate) and potential medication interactions []  Nutritional management   
[]  Exercise  
[]  Signs, symptoms, and treatment of hyperglycemia and hypoglycemia  
[] Prevention, recognition and treatment of hyperglycemia and hypoglycemia  
[]  Importance of blood glucose monitoring and how to obtain a blood glucose meter   
[]  Instruction on use of the blood glucose meter  
[]  Discuss the importance of HbA1C monitoring   
[x]  Sick day guideline: Patient verbalized understandking  
[x]  Proper use and disposal of lancets, needles, syringes or insulin pens (if appropriate) []  Potential long-term complications (retinopathy, kidney disease, neuropathy, foot care) [x] Information about whom to contact in case of emergency or for more information   
[x]  Goal:  Patient/family will demonstrate understanding of Diabetes Self Management Skills by: 10/16/2018 Plan for post-discharge education or self-management support: 
  [x] Outpatient class schedule provided            [] Patient Declined [] Scheduled for outpatient classes (date) _______ Jenny Gil RN 
pgr [phone]

## 2018-10-09 NOTE — PROGRESS NOTES
Problem: Falls - Risk of  Goal: *Absence of Falls  Document Ricarda Fall Risk and appropriate interventions in the flowsheet.    Outcome: Progressing Towards Goal  Fall Risk Interventions:            Medication Interventions: Teach patient to arise slowly

## 2018-10-09 NOTE — DIABETES MGMT
Glycemic Control Plan of Care    T2DM with current A1c of 7.7% (10/08/2018). See separate notes, 10/09/2018, for assessment of home diabetes management and educational needs. Home diabetes meds: Basaglar 30 units daily at bedtime and Novolog sliding scale insulin. POC BG range on 10/08/2018: 262-354 mg/dL. POC BG report on 10/09/2018 at time of review: 281, 297 mg/dL. Patient is currently on 30 units of lantus insulin daily at HS and correctional lispro insulin ACHS. Noted patient received a one time dose of 15 units lantus insulin at 8:43 this morning. IV Solumedrol 60 mg every 6 hours. Recommendation(s):  1.) Continue glycemic monitoring and consider increasing basal lantus insulin to 45 units daily while patient is on steroids. Then decrease the dose back to 30 units daily at HS once patient is off steroids. Assessment:  Patient is 60year old with past medical history including type 2 diabetes mellitus, COPD, hypertension, GERD, SHERRIE on Cpap, obesity, chronic diastolic CHF, and hyperlipidemia - was admitted on 10/07/2018 with report of shortness of breath. Noted:  Asthma - COPD overlap syndrome. T2DM with current A1c of 7.7% (10/09/2018). Most recent blood glucose values:    Results for Jersey Lema (MRN [id number]) as of 10/9/2018 13:50   Ref. Range 10/8/2018 08:12 10/8/2018 11:42 10/8/2018 16:05 10/8/2018 21:05   GLUCOSE,FAST - POC Latest Ref Range: 70 - 110 mg/dL 262 (H) 345 (H) 292 (H) 354 (H)     Results for Jersey Lema (MRN [id number]) as of 10/9/2018 13:50   Ref. Range 10/9/2018 07:21 10/9/2018 11:03   GLUCOSE,FAST - POC Latest Ref Range: 70 - 110 mg/dL 281 (H) 297 (H)     Current A1C: 7.7% (10/08/2018) which is equivalent to estimated average blood glucose of 174 mg/dL during the past 2-3 months. Current hospital diabetes medications:  Basal lantus insulin 30 units daily at bedtime. (Noted patient received a one time dose of15 units at 8:43 this morning). Correctional lispro insulin ACHS. Very resistant dose. Total daily dose insulin requirement previous day: 10/08/2018:  Lantus: 60 units   Lispro: 45 units  TDD: 105 units of insulin    Home diabetes medications: Patient reported on 10/09/2018:  Basaglar (lantus) insulin 30 units daily at bedtime. Novolog sliding scale insulin. Diet: Diabetic consistent carb regular. Goals:  Blood glucose will be within target range of  mg/dL by 10/12/2018.     Education:  _X__  Refer to Diabetes Education Record: 10/09/2018             ___  Education not indicated at this time    Jackalyn Galeazzi, RN [facility]  Pager: [phone]

## 2018-10-09 NOTE — PROGRESS NOTES
[facility]  Pulmonary, Critical Care, and Sleep Medicine      Pulmonary  Progress Note    Name: Alessandra Kam     : 1960     Date: 10/9/2018        Subjective:     10/09/18   Feels improved this am- coughing more productively  Wore and tolerated CPAP overnight  Denies any new complaints  Sitting in bedside chair. HPI:  Patient is a 60 y.o. female  with PMHx of Asthma- COPD (with four hospitalizations this year),  insulin dependent DM, hypertension and GERD who presented on 10/7/18 with worsening dyspnea.       Patient was treated in clinic on  18 for a mild COPD exacerbation. Her symptoms initially improved with the PO Prednisone taper she was started on but worsened two nights ago on 10/5/18. Patient stated that her symptoms significantly deteriorated after she started to lower the Prednisone taper down to 10 mg daily. She stated that her chest was feeling tight and that her breathing became worse which prompted her to come to the ER. She has been battling with frequent exacerbations in past 10 months. 5 months/10 she has had some degree of exacerbation. Recently her PCP instructed her to hold the Incruse and Advair and use nebulized Budesenide. , duonebs at home. She has home O2 at 2 L. She has had persistent cough with occasional yellow mucus, thick white to foamy mucus and some increase in Sinus congestion. Denies GERD  No fevers or chills. She endorsed chest tightness and symptoms of a racing heart. She endorsed SOB and a minimally productive cough. Endorsed slight nausea but no vomiting. In July she had IgE checked- 157. Allergen profile - Negative      Asthma History;  Frequency of symptoms: almost daily Cough, intermittent wheezing, in day time and night time with seasonal- spring and fall worsening  Triggers: environmental, smoke, dust,   No PET's.  No change in home environment  Relief from-  less than twice a week and daily  Peak Flows: personal best  H/o ER visits  No H/o previous intubation  H/o nasal congestion, postnasal drainage, sneezing  H/o itchy eyes, skin rash, itching  H/o nasal /sinus surgery  No H/o Aspirin allergy  No H/o travel  No H/o smoking, recreational drug use  H/o Sleep related symptoms- snoring, awakening due to SOB, choking, wheezing    Occupational: no exposures    Past Medical History:   Diagnosis Date    Asthma     Chronic lung disease     COPD     Cystocele, midline     Diabetes mellitus ([address]     GERD (gastroesophageal reflux disease)     Hidradenitis suppurativa     Hyperlipidemia     Hypertension     SHERRIE on CPAP     CPAP    Stress incontinence      Past Surgical History:   Procedure Laterality Date    BREAST SURGERY PROCEDURE UNLISTED      Right breast benign tumor removal    HX APPENDECTOMY      HX CHOLECYSTECTOMY      HX DILATION AND CURETTAGE      Dysfunctional uterine bleeding, thought 2/2 fibroids    HX TUBAL LIGATION       Allergies   Allergen Reactions    Ancef [Cefazolin] Hives    Contrast Agent [Iodine] Anaphylaxis, Shortness of Breath and Swelling     Needs pre-medication for IV contrast with Benadryl, Solu-Medrol    Fish Containing Products Anaphylaxis     Pt states she had a severe allergic reaction at 9 y/o.  Metformin Other (comments)     Abdominal pain, diarrhea.     Codeine Other (comments)     Altered mental status     Current Facility-Administered Medications   Medication Dose Route Frequency Provider Last Rate Last Dose    lisinopril (PRINIVIL, ZESTRIL) tablet 20 mg  20 mg Oral ACB/HS Evelina Sanon DO        budesonide (PULMICORT) 1,000 mcg/2 mL nebulizer susp  1,000 mcg Nebulization BID RT Radha Gentile MD   1,000 mcg at 10/09/18 0707    arformoterol (BROVANA) neb solution 15 mcg  15 mcg Nebulization BID RT Radha Gentile MD   15 mcg at 10/09/18 0707    tiotropium (SPIRIVA) inhalation capsule 18 mcg  1 Cap Inhalation DAILY Radha Gentile MD   18 mcg at 10/09/18 2422    aspirin delayed-release tablet 81 mg  81 mg Oral DAILY Dominga Robert MD   81 mg at 10/09/18 0846    famotidine (PEPCID) tablet 20 mg  20 mg Oral BID Dominga Robert MD   20 mg at 10/09/18 0846    fluticasone (FLONASE) 50 mcg/actuation nasal spray 2 Spray  2 Spray Both Nostrils DAILY Dominga Robert MD   2 [place] at 10/09/18 0846    pantoprazole (PROTONIX) tablet 40 mg  40 mg Oral DAILY Dominga Robert MD   40 mg at 10/09/18 0845    simethicone (MYLICON) tablet 80 mg  80 mg Oral Q6H PRN Dominga Robert MD        acetaminophen (TYLENOL) tablet 650 mg  650 mg Oral Q4H PRN Dominga Robert MD   650 mg at 10/08/18 1750    enoxaparin (LOVENOX) injection 40 mg  40 mg SubCUTAneous Q24H Dominga Robert MD   40 mg at 10/09/18 0844    montelukast (SINGULAIR) tablet 10 mg  10 mg Oral QHS Lisandro Burciaga MD   10 mg at 10/08/18 2146    insulin glargine (LANTUS) injection 30 Units  30 Units SubCUTAneous QHS Lisandro Burciaga MD   30 Units at 10/08/18 2200    methylPREDNISolone (PF) (SOLU-MEDROL) injection 60 mg  60 mg IntraVENous Q6H Dominga Robert MD   60 mg at 10/09/18 0524    levoFLOXacin (LEVAQUIN) 750 mg in D5W IVPB  750 mg IntraVENous Q24H Dominga Robert  mL/hr at 10/08/18 2308 750 mg at 10/08/18 2308    insulin lispro (HUMALOG) injection   SubCUTAneous AC&HS Dominga Robert MD   9 Units at 10/09/18 0843    glucose chewable tablet 16 g  4 Tab Oral PRN Dominga Robert MD        glucagon [facility]) injection 1 mg  1 mg IntraMUSCular PRN Dominga Robert MD        dextrose (D50W) injection syrg 12.5-25 g  25-50 mL IntraVENous PRVIANCA Robert MD             Review of Systems:  HEENT: No epistaxis, no nasal drainage, no difficulty in swallowing, no redness in eyes  Respiratory: as above  Cardiovascular: no chest pain, no palpitations, no chronic leg edema, no syncope  Gastrointestinal: no abd pain, no vomiting, no diarrhea, no bleeding symptoms  Genitourinary: No urinary symptoms or hematuria  Integument/breast: No ulcers or rashes  Musculoskeletal:Neg  Neurological: No focal weakness, no seizures, no headaches  Behvioral/Psych: No anxiety, no depression  Constitutional: No fever, no chills, no weight loss, no night sweats     Objective:     Visit Vitals    /68 (BP 1 Location: Right arm, BP Patient Position: At rest)    Pulse 77    Temp 97 °F (36.1 °C)    Resp 18    Ht 5' 3\" (1.6 m)    Wt 114.3 kg (252 lb)    SpO2 98%    BMI 44.64 kg/m2        Physical Exam:   GENERAL: well developed and in  moderate distress  HEENT:  PERRL, EOMI, no alar flaring or epistaxis, oral mucosa moist without cyanosis  NECK:  no jugular vein distention, no retractions, no thyromegaly or masses  LUNGS:  wheezes bilaterally  HEART:  Regular rate and rhythm with no M,G,R; no edema is present  ABDOMEN:  soft with no tenderness, bowel sounds present  EXTREMITIES:  warm with no cyanosis  SKIN:  no jaundice or ecchymosis  NEUROLOGIC:  alert and oriented, grossly non-focal    Data review:     Blood Eosinophils: no eosinophilia  IgE level: 157  Allergy testing:negative  [place] allergen panel:negative  Sputum eosinophils: not done    PFT:  Date FEV1/FVC FEV1%Best VGX89-88% FVC%best TLC% RV% VC% DLCO%   5/27/2016 54 1.32/51% 0.42/17% 2.54/77% 111 202 62 69                Methacholine challenge: not done    Imaging:  I have personally reviewed the patients radiographs and have reviewed the reports:  XR Results (most recent):    Results from Hospital Encounter encounter on 10/07/18   XR CHEST PA LAT   Narrative EXAM:  XR CHEST PA LAT    INDICATION:   SOB    COMPARISON: None. FINDINGS:  Stable mild enlargement of the cardiac silhouette. Pulmonary vasculature  unremarkable. No pneumothorax or pleural effusions. Streaky opacities in the  bases. Mild thoracic spondylosis. .         Impression Impression:    Stable mild enlargement of the cardiac silhouette.  Basilar streaky opacities  suggesting atelectasis versus less likely infiltrate. IMPRESSION:   · Asthma- COPD overlap syndrome- difficult to control T2 low. Severity- moderate with poor Control. Has had several hospitalizations over the past year for COPD-Asthma exacerbations (this is the 4th this year) -Admitted for acute exacerbation failed outpatient therapy with oral steroids. No evidence for infection and has completed course of appropriate antibiotics. Triggers:environmental allergens. ? 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 EVMS- ENT following  · Comorbid conditions- Obesity, DM           · Will optimize in hospital treatment with nebulized bronchodilators, steroids and stepped up Bronchial hygiene protocol  · Use multi targeted trigger optimization strategy  · Consider chronic Macrolide therapy - zithromax 250mg daily X 12 weeks  · IgE, allergen profile reviewed. Not a candidate for biologics  · Trigger control- rhinitis, GERD, environmental allergens  · Continue Flonase  · Discussed concept of controllers, rescue  · Advair and Incruse Ellipta can be resumed at discharge  · Rescue inhaler-albuterol  · Peak flow monitoring  · Action plan given with discussion about Green, Yellow, Red zone actions  · Oxygen and CPAP for home use  · Preventive vaccinations: Influenza, Pneumovax  · Will follow       Health maintenance screens deferred to Primary care provider. High complexity decision making was performed during the evaluation of this patient at high risk for decompensation with multiple organ involvement     Above mentioned total time spent on reviewing the case/medical record/data/notes/EMR/patient examination/documentation/coordinating care with nurse/consultants, exclusive of procedures with complex decision making performed and > 50% time spent in face to face evaluation.      Sol HE Merlinda Howard, MD

## 2018-10-09 NOTE — DIABETES MGMT
Diabetes Patient/Family Education Record    Factors That  May Influence Patients Ability  to Learn or  Comply with Recommendations   []   Language barrier    []   Cultural needs   []   Motivation    []   Cognitive limitation    []   Physical   [x]   Education    []   Physiological factors   []   Hearing/vision/speaking impairment   []   Orthodoxy beliefs    []   Financial factors   []  Other:   []  No factors identified at this time. Person Instructed:   [x]   Patient   []   Family   []  Other     Preference for Learning:   [x]   Verbal   []   Written   []  Demonstration     Level of Comprehension & Competence:    [x]  Good                                      [] Fair                                     []  Poor                             []  Needs Reinforcement   [x]  Teachback completed    Education Component:   [x]  Medication management, including how to administer insulin (if appropriate) and potential medication interactions: Patient reported home diabetes meds:  Basaglar (lantus) insulin 30 units daily at bedtime. Novolog sliding scale insulin. [x]  Nutritional management: Patient reported that she's compliant with diet regarding serving size/portion control of carbs (starches, fruits, dairy) and limiting intake of concentrated sweets. []  Exercise   [x]  Signs, symptoms, and treatment of hyperglycemia and hypoglycemia: Patient verbalized understanding. [x] Prevention, recognition and treatment of hyperglycemia and hypoglycemia: Patient verbalized understanding. [x]  Importance of blood glucose monitoring and how to obtain a blood glucose meter: Patient reported that she has BG meter and testing supplies. She is checking her blood sugar regularly.     []  Instruction on use of the blood glucose meter   [x]  Discuss the importance of HbA1C monitoring: Current A1c level is 7.7% (10/08/2018) which is equivalent to estimated average blood glucose of 174 mg/dL during the past 2-3 months. [x]  Sick day guideline: Patient verbalized understanding that blood sugar will be running high therefore it is recommended to continue to take diabetes medications. Get emergency medical help when blood sugar readings is up to 300's and symptoms including nausea, vomiting, abd pain, fruity breath odor and confusion.     [x]  Proper use and disposal of lancets, needles, syringes or insulin pens (if appropriate)   []  Potential long-term complications (retinopathy, kidney disease, neuropathy, foot care)   [x] Information about whom to contact in case of emergency or for more information    [x]  Goal:  Patient/family will demonstrate understanding of Diabetes Self Management Skills by: 10/16/2018  Plan for post-discharge education or self-management support:    [x] Outpatient class schedule provided            [] Patient Declined    [] Scheduled for outpatient classes (date) _______     Sari Cunha RN  [facility] [phone]

## 2018-10-10 LAB
ANION GAP SERPL CALC-SCNC: 6 MMOL/L (ref 3–18)
BASOPHILS # BLD: 0 K/UL (ref 0–0.1)
BASOPHILS NFR BLD: 0 % (ref 0–2)
BUN SERPL-MCNC: 20 MG/DL (ref 7–18)
BUN/CREAT SERPL: 19 (ref 12–20)
CALCIUM SERPL-MCNC: 8.9 MG/DL (ref 8.5–10.1)
CHLORIDE SERPL-SCNC: 104 MMOL/L (ref 100–108)
CO2 SERPL-SCNC: 27 MMOL/L (ref 21–32)
CREAT SERPL-MCNC: 1.03 MG/DL (ref 0.6–1.3)
DIFFERENTIAL METHOD BLD: ABNORMAL
EOSINOPHIL # BLD: 0 K/UL (ref 0–0.4)
EOSINOPHIL NFR BLD: 0 % (ref 0–5)
ERYTHROCYTE [DISTWIDTH] IN BLOOD BY AUTOMATED COUNT: 13.5 % (ref 11.6–14.5)
GLUCOSE BLD STRIP.AUTO-MCNC: 281 MG/DL (ref 70–110)
GLUCOSE BLD STRIP.AUTO-MCNC: 284 MG/DL (ref 70–110)
GLUCOSE BLD STRIP.AUTO-MCNC: 295 MG/DL (ref 70–110)
GLUCOSE BLD STRIP.AUTO-MCNC: 378 MG/DL (ref 70–110)
GLUCOSE SERPL-MCNC: 276 MG/DL (ref 74–99)
HCT VFR BLD AUTO: 36.5 % (ref 35–45)
HGB BLD-MCNC: 12.3 G/DL (ref 12–16)
LYMPHOCYTES # BLD: 1.1 K/UL (ref 0.9–3.6)
LYMPHOCYTES NFR BLD: 9 % (ref 21–52)
MCH RBC QN AUTO: 30 PG (ref 24–34)
MCHC RBC AUTO-ENTMCNC: 33.7 G/DL (ref 31–37)
MCV RBC AUTO: 89 FL (ref 74–97)
MONOCYTES # BLD: 0.2 K/UL (ref 0.05–1.2)
MONOCYTES NFR BLD: 2 % (ref 3–10)
NEUTS SEG # BLD: 10.4 K/UL (ref 1.8–8)
NEUTS SEG NFR BLD: 89 % (ref 40–73)
PLATELET # BLD AUTO: 259 K/UL (ref 135–420)
PMV BLD AUTO: 9.3 FL (ref 9.2–11.8)
POTASSIUM SERPL-SCNC: 4.5 MMOL/L (ref 3.5–5.5)
RBC # BLD AUTO: 4.1 M/UL (ref 4.2–5.3)
SODIUM SERPL-SCNC: 137 MMOL/L (ref 136–145)
WBC # BLD AUTO: 11.7 K/UL (ref 4.6–13.2)

## 2018-10-10 PROCEDURE — 94640 AIRWAY INHALATION TREATMENT: CPT

## 2018-10-10 PROCEDURE — 74011636637 HC RX REV CODE- 636/637: Performed by: INTERNAL MEDICINE

## 2018-10-10 PROCEDURE — 74011636637 HC RX REV CODE- 636/637: Performed by: FAMILY MEDICINE

## 2018-10-10 PROCEDURE — 74011000250 HC RX REV CODE- 250: Performed by: INTERNAL MEDICINE

## 2018-10-10 PROCEDURE — 74011250637 HC RX REV CODE- 250/637: Performed by: FAMILY MEDICINE

## 2018-10-10 PROCEDURE — 74011250636 HC RX REV CODE- 250/636: Performed by: STUDENT IN AN ORGANIZED HEALTH CARE EDUCATION/TRAINING PROGRAM

## 2018-10-10 PROCEDURE — 36415 COLL VENOUS BLD VENIPUNCTURE: CPT | Performed by: FAMILY MEDICINE

## 2018-10-10 PROCEDURE — 65660000000 HC RM CCU STEPDOWN

## 2018-10-10 PROCEDURE — 94660 CPAP INITIATION&MGMT: CPT

## 2018-10-10 PROCEDURE — 85025 COMPLETE CBC W/AUTO DIFF WBC: CPT | Performed by: FAMILY MEDICINE

## 2018-10-10 PROCEDURE — 82962 GLUCOSE BLOOD TEST: CPT

## 2018-10-10 PROCEDURE — 74011636637 HC RX REV CODE- 636/637: Performed by: STUDENT IN AN ORGANIZED HEALTH CARE EDUCATION/TRAINING PROGRAM

## 2018-10-10 PROCEDURE — 80048 BASIC METABOLIC PNL TOTAL CA: CPT | Performed by: FAMILY MEDICINE

## 2018-10-10 PROCEDURE — 74011250637 HC RX REV CODE- 250/637: Performed by: STUDENT IN AN ORGANIZED HEALTH CARE EDUCATION/TRAINING PROGRAM

## 2018-10-10 PROCEDURE — 74011250637 HC RX REV CODE- 250/637: Performed by: INTERNAL MEDICINE

## 2018-10-10 RX ORDER — INSULIN GLARGINE 100 [IU]/ML
15 INJECTION, SOLUTION SUBCUTANEOUS ONCE
Status: COMPLETED | OUTPATIENT
Start: 2018-10-10 | End: 2018-10-10

## 2018-10-10 RX ORDER — AZITHROMYCIN 250 MG/1
250 TABLET, FILM COATED ORAL DAILY
Status: DISCONTINUED | OUTPATIENT
Start: 2018-10-10 | End: 2018-10-11 | Stop reason: HOSPADM

## 2018-10-10 RX ORDER — PREDNISONE 20 MG/1
40 TABLET ORAL
Status: DISCONTINUED | OUTPATIENT
Start: 2018-10-10 | End: 2018-10-11 | Stop reason: DRUGHIGH

## 2018-10-10 RX ADMIN — LISINOPRIL 20 MG: 20 TABLET ORAL at 07:00

## 2018-10-10 RX ADMIN — INSULIN GLARGINE 30 UNITS: 100 INJECTION, SOLUTION SUBCUTANEOUS at 21:52

## 2018-10-10 RX ADMIN — LISINOPRIL 20 MG: 20 TABLET ORAL at 21:55

## 2018-10-10 RX ADMIN — BUDESONIDE 1000 MCG: 1 SUSPENSION RESPIRATORY (INHALATION) at 20:25

## 2018-10-10 RX ADMIN — INSULIN GLARGINE 15 UNITS: 100 INJECTION, SOLUTION SUBCUTANEOUS at 15:04

## 2018-10-10 RX ADMIN — FLUTICASONE PROPIONATE 2 SPRAY: 50 SPRAY, METERED NASAL at 08:25

## 2018-10-10 RX ADMIN — PANTOPRAZOLE SODIUM 40 MG: 40 TABLET, DELAYED RELEASE ORAL at 08:24

## 2018-10-10 RX ADMIN — MONTELUKAST SODIUM 10 MG: 10 TABLET, COATED ORAL at 21:54

## 2018-10-10 RX ADMIN — INSULIN LISPRO 9 UNITS: 100 INJECTION, SOLUTION INTRAVENOUS; SUBCUTANEOUS at 21:50

## 2018-10-10 RX ADMIN — INSULIN LISPRO 9 UNITS: 100 INJECTION, SOLUTION INTRAVENOUS; SUBCUTANEOUS at 08:23

## 2018-10-10 RX ADMIN — ARFORMOTEROL TARTRATE 15 MCG: 15 SOLUTION RESPIRATORY (INHALATION) at 07:51

## 2018-10-10 RX ADMIN — TIOTROPIUM BROMIDE 18 MCG: 18 CAPSULE ORAL; RESPIRATORY (INHALATION) at 09:42

## 2018-10-10 RX ADMIN — INSULIN LISPRO 15 UNITS: 100 INJECTION, SOLUTION INTRAVENOUS; SUBCUTANEOUS at 15:42

## 2018-10-10 RX ADMIN — ENOXAPARIN SODIUM 40 MG: 100 INJECTION SUBCUTANEOUS at 08:24

## 2018-10-10 RX ADMIN — PREDNISONE 40 MG: 20 TABLET ORAL at 09:41

## 2018-10-10 RX ADMIN — LEVOFLOXACIN 750 MG: 5 INJECTION, SOLUTION INTRAVENOUS at 01:07

## 2018-10-10 RX ADMIN — METHYLPREDNISOLONE SODIUM SUCCINATE 60 MG: 125 INJECTION, POWDER, FOR SOLUTION INTRAMUSCULAR; INTRAVENOUS at 01:07

## 2018-10-10 RX ADMIN — BUDESONIDE 1000 MCG: 1 SUSPENSION RESPIRATORY (INHALATION) at 07:51

## 2018-10-10 RX ADMIN — ASPIRIN 81 MG: 81 TABLET, COATED ORAL at 08:24

## 2018-10-10 RX ADMIN — FAMOTIDINE 20 MG: 20 TABLET ORAL at 08:24

## 2018-10-10 RX ADMIN — ARFORMOTEROL TARTRATE 15 MCG: 15 SOLUTION RESPIRATORY (INHALATION) at 20:25

## 2018-10-10 RX ADMIN — AZITHROMYCIN 250 MG: 250 TABLET, FILM COATED ORAL at 09:40

## 2018-10-10 RX ADMIN — FAMOTIDINE 20 MG: 20 TABLET ORAL at 17:19

## 2018-10-10 RX ADMIN — INSULIN LISPRO 9 UNITS: 100 INJECTION, SOLUTION INTRAVENOUS; SUBCUTANEOUS at 12:12

## 2018-10-10 RX ADMIN — METHYLPREDNISOLONE SODIUM SUCCINATE 60 MG: 125 INJECTION, POWDER, FOR SOLUTION INTRAMUSCULAR; INTRAVENOUS at 05:26

## 2018-10-10 NOTE — ROUTINE PROCESS
LOC: alert x 4     Vitals: wnl, lungs sounds diminished, on 02 @ 2 LPM via NC, continues to wear CPAP at night w/o issues,        IV: 20 to R AC, patent -on iv ABTs        Intake: significant PO intake         Output: continues to void well, clear yellow urine        Changes: no changes   (Blood sugars remain high, currently on sliding scale, and lantus, but is also receiving solu-medrol )

## 2018-10-10 NOTE — PROGRESS NOTES
*ATTENTION:  This note has been created by a medical student for educational purposes only. Please do not refer to the content of this note for clinical decision-making, billing, or other purposes. Please see attending physicians note to obtain clinical information on this patient. *     Progress Note  PF EVMS Service    Patient: Bar Hector MRN: [id number]   SSN: [id number]  YOB: 1960   Age: 60 y.o. Sex: female      Admit Date: 10/7/2018    LOS: 3 days   Chief Complaint   Patient presents with    Shortness of Breath       Subjective:     Overnight, the night team did not receive any calls. This morning, she says she feels \"80% better\" than she did on admission. She is still coughing up some clear and frothy phlegm. She is going to try to walk around some more today. She denies any chest pain, palpitations, fevers, chills, N/V, or abdominal pain. Review of Systems:  Constitutional: Negative for fever, chills and diaphoresis. HENT: Negative for sore throat. Respiratory: Endorses cough. Negative for hemoptysis. Cardiovascular: Negative for chest pain and palpitations. Gastrointestinal: Negative for nausea and vomiting. Musculoskeletal: Negative for myalgias and joint pain. Skin: Negative for itching and rash. Neurological: Endorses headaches. Negative for dizziness. Objective:     Vitals:  Visit Vitals    /80 (BP 1 Location: Right arm, BP Patient Position: At rest)    Pulse 60    Temp 98 °F (36.7 °C)    Resp 16    Ht 5' 3\" (1.6 m)    Wt 114.3 kg (252 lb)    SpO2 96%    BMI 44.64 kg/m2       Physical Exam:   General appearance: alert, cooperative, no distress, appears stated age  Lungs: mild wheezing in bilateral lower lobes, good aeration throughout lungs  Heart: regular rate and rhythm, S1, S2 normal, no murmur, click, rub or gallop  Abdomen: soft, non-tender.  Bowel sounds normal. No masses,  no organomegaly  Pulses: 2+ and symmetric radial and pedal pulses  Ext: no edema in the BLE  Skin: Skin color, texture, turgor normal. No rashes or lesions  Neuro:  normal without focal findings; mental status, speech normal, alert and oriented x3    Intake and Output:  Current Shift:    Last three shifts: 10/08 1901 - 10/10 0700  In: 1920 [P.O.:1920]  Out: 1     Lab/Data Review:  Recent Results (from the past 12 hour(s))   GLUCOSE, POC    Collection Time: 10/09/18  8:37 PM   Result Value Ref Range    Glucose (POC) 355 (H) 70 - 110 mg/dL   GLUCOSE, POC    Collection Time: 10/09/18  9:20 PM   Result Value Ref Range    Glucose (POC) 369 (H) 70 - 110 mg/dL   GLUCOSE, POC    Collection Time: 10/09/18 11:03 PM   Result Value Ref Range    Glucose (POC) 333 (H) 70 - 110 mg/dL   CBC WITH AUTOMATED DIFF    Collection Time: 10/10/18  2:20 AM   Result Value Ref Range    WBC 11.7 4.6 - 13.2 K/uL    RBC 4.10 (L) 4.20 - 5.30 M/uL    HGB 12.3 12.0 - 16.0 g/dL    HCT 36.5 35.0 - 45.0 %    MCV 89.0 74.0 - 97.0 FL    MCH 30.0 24.0 - 34.0 PG    MCHC 33.7 31.0 - 37.0 g/dL    RDW 13.5 11.6 - 14.5 %    PLATELET 451 220 - 303 K/uL    MPV 9.3 9.2 - 11.8 FL    NEUTROPHILS 89 (H) 40 - 73 %    LYMPHOCYTES 9 (L) 21 - 52 %    MONOCYTES 2 (L) 3 - 10 %    EOSINOPHILS 0 0 - 5 %    BASOPHILS 0 0 - 2 %    ABS. NEUTROPHILS 10.4 (H) 1.8 - 8.0 K/UL    ABS. LYMPHOCYTES 1.1 0.9 - 3.6 K/UL    ABS. MONOCYTES 0.2 0.05 - 1.2 K/UL    ABS. EOSINOPHILS 0.0 0.0 - 0.4 K/UL    ABS.  BASOPHILS 0.0 0.0 - 0.1 K/UL    DF AUTOMATED     METABOLIC PANEL, BASIC    Collection Time: 10/10/18  2:20 AM   Result Value Ref Range    Sodium 137 136 - 145 mmol/L    Potassium 4.5 3.5 - 5.5 mmol/L    Chloride 104 100 - 108 mmol/L    CO2 27 21 - 32 mmol/L    Anion gap 6 3.0 - 18 mmol/L    Glucose 276 (H) 74 - 99 mg/dL    BUN 20 (H) 7.0 - 18 MG/DL    Creatinine 1.03 0.6 - 1.3 MG/DL    BUN/Creatinine ratio 19 12 - 20      GFR est AA >60 >60 ml/min/1.73m2    GFR est non-AA 55 (L) >60 ml/min/1.73m2    Calcium 8.9 8.5 - 10.1 MG/DL Key Findings or tests:     Troponins and cardiac enzymes 10/7/18: negative    Pro-BNP 10/7/18: 46    CXR 10/7/18: Stable mild enlargement of the cardiac silhouette. Basilar streaky opacities suggesting atelectasis versus less likely infiltrate. ABG 10/7/18: 7.47/40.4/149/29.7    Telemetry YES   Oxygen Nasal cannula 2 L/min     Assessment and Plan:     Ms. Von Slade is a 60year old female with a PMH of COPD on home O2 at 2 L/min, HTN, DM, HFpEF, SHERRIE, and GERD who was admitted for a COPD exacerbation. 1. COPD exacerbation: Had a PFT on 11/2016 with FEV1/FVC 0.56 (71% of predicted) and DLCO 44%. This is her 4th admission for COPD exacerbation this year. She is on continuous O2 therapy at home of 2 L/min. She has been evaluated by [facility] ENT for a left frontal sinus osteoma. She is also a former tobacco user who quit in 2006 with a 30 year pack hx.  -Pulm consult: asthma-COPD overlap syndrome   -Recs: start Azithromax 250 mg daily for 12 weeks, not a candidate for biologics, control triggers (rhinitis, GERD, environmental allergens), continue Flonase, can resume Advair and Incruse Ellipta at discharge, Albuterol as rescue inhaler  -Sputum culture: negative  -Continue Brovana and Pulmicort nebulizers BID  -Continue Methylprednisone 60 mg q6h  -Continue Levaquin 750 mg daily for 5 days (day 3/5 today)  -Continue Singulair 10 mg nightly  -Continue home O2 - 2L NC  -Continue Flonase    2. HTN: Elevated on admission. Pt says she sometimes skips her Lisinopril dose if her BP is good at home. /80 this morning.  -Continue Lisinopril 40 mg daily    3. DM: A1c 7.9% in 7/2018.  -Continue Lantus 30u nightly, Lispro 4x/d before meals and nightly  -Monitor blood glucose readings, currently on steroid therapy    4. HFpEF: Echo 7/2018 showed LVEF 66-70% with mild concentric hypertrophy. Declined statin therapy earlier this year. Sees Dr. Grossman Due with cardiology.  -Continue Aspirin 81 mg daily    5.  SHERRIE: Had a sleep study done in 8/2016. Follows up with pulm outpatient.  -Continue CPAP nightly    6.  GERD  -Continue Famotidine 20 mg BID and Pantoprazole 40 mg daily    Diet  Diabetic   DVT Prophylax  Lovenox   Code status  Full        Sandra Burnett , MS4   October 10, 2018

## 2018-10-10 NOTE — DIABETES MGMT
Glycemic Control Plan of Care    T2DM with current A1c of 7.7% (10/08/2018). See separate notes, 10/09/2018, for assessment of home diabetes management and educational needs. Home diabetes meds: Basaglar 30 units daily at bedtime and Novolog sliding scale insulin. Steroids induced hyperglycemia. POC BG range on 10/09/2018: 281-369 mg/dL. Patient received at total of 45 units lantus insulin and 45 units of very resistant dose of correctional lispro insulin. POC BG report on 10/10/2018 at time of review: 284, 295 mg/dL. Noted IV Solumedrol changed to Prednisone 40 mg daily. Patient is currently on 30 units of lantus insulin daily at HS and very resistant dose of correctional lispro insulin ACHS. She received a total of 90 units of insulin on 10/09/2018. Seen patient this morning and discussed continued inpatient BG monitoring and intervention. Patient anticipate disch to home soon and she verbalized understanding regarding home glucose monitoring. She knows the importance of contacting her medical provider if she experience cont'd high blood sugar at home. Recommendation(s):  1.) Continue glycemic monitoring and consider increasing basal lantus insulin to 40 units daily while patient is on steroids. Then decrease the dose back to 30 units daily at HS once patient is off steroids. 2.) Modified diabetic diet by including no concentrated sweets. Assessment:  Patient is 60year old with past medical history including type 2 diabetes mellitus, COPD, hypertension, GERD, SHERRIE on Cpap, obesity, chronic diastolic CHF, and hyperlipidemia - was admitted on 10/07/2018 with report of shortness of breath. Noted:  Asthma - COPD overlap syndrome. T2DM with current A1c of 7.7% (10/09/2018). Most recent blood glucose values:    Results for Alok Pride (MRN [id number]) as of 10/10/2018 11:17   Ref.  Range 10/9/2018 07:21 10/9/2018 11:03 10/9/2018 16:46 10/9/2018 20:37 10/9/2018 21:20 10/9/2018 23:03   [place] Ref Range: 70 - 110 mg/dL 281 (H) 297 (H) 308 (H) 355 (H) 369 (H) 333 (H)     Results for Christina Em (MRN [id number]) as of 10/10/2018 11:17   Ref. Range 10/10/2018 08:04 10/10/2018 10:57   GLUCOSE,FAST - POC Latest Ref Range: 70 - 110 mg/dL 284 (H) 295 (H)     Current A1C: 7.7% (10/08/2018) which is equivalent to estimated average blood glucose of 174 mg/dL during the past 2-3 months. Current hospital diabetes medications:  Basal lantus insulin 30 units daily at bedtime. Correctional lispro insulin ACHS. Very resistant dose. Total daily dose insulin requirement previous day: 10/09/2018:  Lantus: 45 units   Lispro: 45 units  TDD: 90 units of insulin    Home diabetes medications: Patient reported on 10/09/2018:  Basaglar (lantus) insulin 30 units daily at bedtime. Novolog sliding scale insulin. Diet: Diabetic consistent carb regular; no concentrated sweets. Goals:  Blood glucose will be within target range of  mg/dL by 10/13/2018.     Education:  _X__  Refer to Diabetes Education Record: 10/09/2018             ___  Education not indicated at this time    Mariaelena Dixon RN [facility]  Pager: [phone]

## 2018-10-10 NOTE — PROGRESS NOTES
Patient A&o x 4, able to express need, RR even and unlabored,  Lung sounds diminished, on 02 @ 2LPM via NC, + bowel sounds, 1 bm today, PO fluids provided, pulses palpable. , remain on tele box 40, NSR

## 2018-10-10 NOTE — PROGRESS NOTES
[facility]  Pulmonary, Critical Care, and Sleep Medicine      Pulmonary  Progress Note    Name: Jared Gordillo     : 1960     Date: 10/10/2018        Subjective:     10/10/18   Feels improved this am- coughing more productively and feels significantly improved  Slept well  Wore and tolerated CPAP overnight  Denies any new complaints  Sitting in bed. Has not ambulated yet    HPI:  Patient is a 60 y.o. female  with PMHx of Asthma- COPD (with four hospitalizations this year),  insulin dependent DM, hypertension and GERD who presented on 10/7/18 with worsening dyspnea.       Patient was treated in clinic on  18 for a mild COPD exacerbation. Her symptoms initially improved with the PO Prednisone taper she was started on but worsened two nights ago on 10/5/18. Patient stated that her symptoms significantly deteriorated after she started to lower the Prednisone taper down to 10 mg daily. She stated that her chest was feeling tight and that her breathing became worse which prompted her to come to the ER. She has been battling with frequent exacerbations in past 10 months. 5 months/10 she has had some degree of exacerbation. Recently her PCP instructed her to hold the Incruse and Advair and use nebulized Budesenide. , duonebs at home. She has home O2 at 2 L. She has had persistent cough with occasional yellow mucus, thick white to foamy mucus and some increase in Sinus congestion. Denies GERD  No fevers or chills. She endorsed chest tightness and symptoms of a racing heart. She endorsed SOB and a minimally productive cough. Endorsed slight nausea but no vomiting. In July she had IgE checked- 157. Allergen profile - Negative      Asthma History;  Frequency of symptoms: almost daily Cough, intermittent wheezing, in day time and night time with seasonal- spring and fall worsening  Triggers: environmental, smoke, dust,   No PET's.  No change in home environment  Relief from-  less than twice a week and daily  Peak Flows: personal best  H/o ER visits  No H/o previous intubation  H/o nasal congestion, postnasal drainage, sneezing  H/o itchy eyes, skin rash, itching  H/o nasal /sinus surgery  No H/o Aspirin allergy  No H/o travel  No H/o smoking, recreational drug use  H/o Sleep related symptoms- snoring, awakening due to SOB, choking, wheezing    Occupational: no exposures    Past Medical History:   Diagnosis Date    Asthma     Chronic lung disease     COPD     Cystocele, midline     Diabetes mellitus ([address]     GERD (gastroesophageal reflux disease)     Hidradenitis suppurativa     Hyperlipidemia     Hypertension     SHERRIE on CPAP     CPAP    Stress incontinence      Past Surgical History:   Procedure Laterality Date    BREAST SURGERY PROCEDURE UNLISTED      Right breast benign tumor removal    HX APPENDECTOMY      HX CHOLECYSTECTOMY      HX DILATION AND CURETTAGE      Dysfunctional uterine bleeding, thought 2/2 fibroids    HX TUBAL LIGATION       Allergies   Allergen Reactions    Ancef [Cefazolin] Hives    Contrast Agent [Iodine] Anaphylaxis, Shortness of Breath and Swelling     Needs pre-medication for IV contrast with Benadryl, Solu-Medrol    Fish Containing Products Anaphylaxis     Pt states she had a severe allergic reaction at 9 y/o.  Metformin Other (comments)     Abdominal pain, diarrhea.     Codeine Other (comments)     Altered mental status     Current Facility-Administered Medications   Medication Dose Route Frequency Provider Last Rate Last Dose    predniSONE (DELTASONE) tablet 40 mg  40 mg Oral DAILY WITH BREAKFAST Troy Tate MD        azithromycin (ZITHROMAX) tablet 250 mg  250 mg Oral DAILY Troy Tate MD        lisinopril (PRINIVIL, ZESTRIL) tablet 20 mg  20 mg Oral ACB/HS Derek Rushing, DO   20 mg at 10/10/18 0700    budesonide (PULMICORT) 1,000 mcg/2 mL nebulizer susp  1,000 mcg Nebulization BID RT Troy Tate MD   1,000 mcg at 10/10/18 0751    arformoterol (BROVANA) neb solution 15 mcg  15 mcg Nebulization BID RT No Jurado MD   15 mcg at 10/10/18 0751    tiotropium (SPIRIVA) inhalation capsule 18 mcg  1 Cap Inhalation DAILY No Jurado MD   Stopped at 10/10/18 0900    aspirin delayed-release tablet 81 mg  81 mg Oral DAILY Megha Nielson MD   81 mg at 10/10/18 0091    famotidine (PEPCID) tablet 20 mg  20 mg Oral BID Megha Nielson MD   20 mg at 10/10/18 1960    fluticasone (FLONASE) 50 mcg/actuation nasal spray 2 Spray  2 Spray Both Nostrils DAILY Megha Nielson MD   2 Spray at 10/10/18 0825    pantoprazole (PROTONIX) tablet 40 mg  40 mg Oral DAILY Megha Nielson MD   40 mg at 10/10/18 0824    simethicone (MYLICON) tablet 80 mg  80 mg Oral Q6H PRN Megha Nielson MD        acetaminophen (TYLENOL) tablet 650 mg  650 mg Oral Q4H PRN Megha Nielson MD   650 mg at 10/08/18 1750    enoxaparin (LOVENOX) injection 40 mg  40 mg SubCUTAneous Q24H Megha Nielson MD   40 mg at 10/10/18 0824    montelukast (SINGULAIR) tablet 10 mg  10 mg Oral QHS Pastor Agata MD   10 mg at 10/09/18 2118    insulin glargine (LANTUS) injection 30 Units  30 Units SubCUTAneous QHS Pastor Agata MD   30 Units at 10/09/18 2118    insulin lispro (HUMALOG) injection   SubCUTAneous AC&HS Megha Nielson MD   9 Units at 10/10/18 9133    glucose chewable tablet 16 g  4 Tab Oral PRN Megha Nielson MD        glucagon [facility]) injection 1 mg  1 mg IntraMUSCular PRN Megha Nielson MD        dextrose (D50W) injection syrg 12.5-25 g  25-50 mL IntraVENous PRN Megha Nielson MD             Review of Systems:  HEENT: No epistaxis, no nasal drainage, no difficulty in swallowing, no redness in eyes  Respiratory: as above  Cardiovascular: no chest pain, no palpitations, no chronic leg edema, no syncope  Gastrointestinal: no abd pain, no vomiting, no diarrhea, no bleeding symptoms  Genitourinary: No urinary symptoms or hematuria  Integument/breast: No ulcers or rashes  Musculoskeletal:Neg  Neurological: No focal weakness, no seizures, no headaches  Behvioral/Psych: No anxiety, no depression  Constitutional: No fever, no chills, no weight loss, no night sweats     Objective:     Visit Vitals    /80 (BP 1 Location: Right arm, BP Patient Position: At rest)    Pulse 60    Temp 98 °F (36.7 °C)    Resp 16    Ht 5' 3\" (1.6 m)    Wt 114.3 kg (252 lb)    SpO2 96%    BMI 44.64 kg/m2        Physical Exam:   GENERAL: well developed and in  moderate distress  HEENT:  PERRL, EOMI, no alar flaring or epistaxis, oral mucosa moist without cyanosis  NECK:  no jugular vein distention, no retractions, no thyromegaly or masses  LUNGS:  wheezes bilaterally  HEART:  Regular rate and rhythm with no M,G,R; no edema is present  ABDOMEN:  soft with no tenderness, bowel sounds present  EXTREMITIES:  warm with no cyanosis  SKIN:  no jaundice or ecchymosis  NEUROLOGIC:  alert and oriented, grossly non-focal    Data review:     Blood Eosinophils: no eosinophilia  IgE level: 157  Allergy testing:negative  [facility] allergen panel:negative  Sputum eosinophils: not done    PFT:  Date FEV1/FVC FEV1%Best YMA18-21% FVC%best TLC% RV% VC% DLCO%   5/27/2016 54 1.32/51% 0.42/17% 2.54/77% 111 202 62 69                Methacholine challenge: not done    Imaging:  I have personally reviewed the patients radiographs and have reviewed the reports:  XR Results (most recent):    Results from Hospital Encounter encounter on 10/07/18   XR CHEST PA LAT   Narrative EXAM:  XR CHEST PA LAT    INDICATION:   SOB    COMPARISON: None. FINDINGS:  Stable mild enlargement of the cardiac silhouette. Pulmonary vasculature  unremarkable. No pneumothorax or pleural effusions. Streaky opacities in the  bases. Mild thoracic spondylosis. .         Impression Impression:    Stable mild enlargement of the cardiac silhouette.  Basilar streaky opacities  suggesting atelectasis versus less likely infiltrate. IMPRESSION:   · Asthma- COPD overlap syndrome- difficult to control T2 low. Severity- moderate with poor Control. Has had several hospitalizations over the past year for COPD-Asthma exacerbations (this is the 4th this year) -Admitted for acute exacerbation failed outpatient therapy with oral steroids. No evidence for infection and has completed course of appropriate antibiotics. Triggers:environmental allergens. ? 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 EVMS- ENT following  · Comorbid conditions- Obesity, DM           · Will continue to optimize in hospital treatment with nebulized bronchodilators, steroids and stepped up Bronchial hygiene protocol  · Use multi targeted trigger optimization strategy  · Will start  chronic Macrolide therapy - zithromax 250mg daily X 12 weeks  · IgE, allergen profile reviewed. Not a candidate for biologics  · Trigger control- rhinitis, GERD, environmental allergens,airway inflammation  · Continue Flonase  · Discussed concept of controllers, rescue  · Advair and Incruse Ellipta can be resumed at discharge- explained rationale to patient  · Rescue inhaler-albuterol  · Peak flow monitoring  · Action plan given with discussion about Green, Yellow, Red zone actions  · Oxygen and CPAP for home use  · Preventive vaccinations: Influenza, Pneumovax  · Ambulate   · Will follow       Health maintenance screens deferred to Primary care provider.     High complexity decision making was performed during the evaluation of this patient at high risk for decompensation with multiple organ involvement     Above mentioned total time spent on reviewing the case/medical record/data/notes/EMR/patient examination/documentation/coordinating care with nurse/consultants, exclusive of procedures with complex decision making performed and > 50% time spent in face to face evaluation.      Compa Argueta MD

## 2018-10-10 NOTE — CDMP QUERY
Please clarify if this patient is being treated/managed for: 
 
=>Chronic Hypoxic Respiratory Failure on Home O2 2L 
=>Other Explanation of clinical findings =>Unable to Determine (no explanation of clinical findings) The medical record reflects the following: 
 
Risk:   Dx COPD/Asthma exac, hx diastolic CHF Clinical Indicators: 
--H&P:  PMH COPD on home O2 
--pulm consult:  She has home O2 at 2 L., Asthma- COPD overlap syndrome Treatment: O2 during hospitalization Please clarify and document your clinical opinion in the progress notes and discharge summary including the definitive and/or presumptive diagnosis, (suspected or probable), related to the above clinical findings. Please include clinical findings supporting your diagnosis. If you DECLINE this query or would like to communicate with [facility], please utilize the \"[facility] message box\" at the TOP of the Progress Note on the right. Thank you, 
Zenon Pete RN BSN CCDS [phone]

## 2018-10-10 NOTE — ROUTINE PROCESS
Bedside and Verbal shift change report given to Jose E Hodge (oncoming nurse) by Silviano Bah RN (offgoing nurse). Report included the following information SBAR, Kardex, MAR and Recent Results.     SITUATION:    Code Status: Full Code  Reason for Admission: Acute exacerbation of chronic obstructive pulmonary disease (COPD) ([facility]   COPD exacerbation ([facility]     Hospital day: 3   Problem List:       Hospital Problems  Date Reviewed: 7/30/2018          Codes Class Noted POA    * (Principal)Acute exacerbation of chronic obstructive pulmonary disease (COPD) ([facility] ICD-10-CM: J44.1  ICD-9-CM: 491.21  10/7/2018 Unknown        COPD exacerbation ([facility] ICD-10-CM: J44.1  ICD-9-CM: 491.21  2/8/2017 Unknown              BACKGROUND:    Past Medical History:   Past Medical History:   Diagnosis Date    Asthma     Chronic lung disease     COPD     Cystocele, midline     Diabetes mellitus ([facility]     GERD (gastroesophageal reflux disease)     Hidradenitis suppurativa     Hyperlipidemia     Hypertension     SHERRIE on CPAP     CPAP    Stress incontinence          Patient taking anticoagulants yes     ASSESSMENT:    Changes in Assessment Throughout Shift: no     Patient has Central Line: no Reasons if yes: n/a   Patient has Dean Cath: no Reasons if yes: n/a      Last Vitals:     Vitals:    10/10/18 1109 10/10/18 1350 10/10/18 1542 10/10/18 1555   BP: 156/84  150/77 150/77   Pulse: 69  71 71   Resp: 16  20 18   Temp: 97.6 °F (36.4 °C)  97.7 °F (36.5 °C) 97.7 °F (36.5 °C)   SpO2: 97% 93% 94% 94%   Weight:       Height:            IV and DRAINS (will only show if present)   Peripheral IV 10/07/18 Right Antecubital-Site Assessment: Clean, dry, & intact     WOUND (if present)   Wound Type:  none   Dressing present Dressing Present : No   Wound Concerns/Notes:  none     PAIN    Pain Assessment    Pain Intensity 1: 4 (10/10/18 1542)    Pain Location 1: Head    Pain Intervention(s) 1: Refused    Patient Stated Pain Goal: 0  o Interventions for Pain:  none  o Intervention effective: n/a  o Time of last intervention: denies pain  o Reassessment Completed: yes      Last 3 Weights:  Last 3 Recorded Weights in this Encounter    10/07/18 1634   Weight: 114.3 kg (252 lb)     Weight change:      INTAKE/OUPUT    Current Shift:      Last three shifts: 10/09 0701 - 10/10 1900  In: 720 [P.O.:720]  Out: 1      LAB RESULTS     Recent Labs      10/10/18   0220  10/09/18   0134  10/08/18   0343   WBC  11.7  14.0*  8.6   HGB  12.3  11.7*  13.0   HCT  36.5  34.9*  37.7   PLT  259  283  265        Recent Labs      10/10/18   0220  10/09/18   0134  10/08/18   0343   NA  137  136  138   K  4.5  4.2  4.7   GLU  276*  297*  279*   BUN  20*  20*  13   CREA  1.03  1.05  0.95   CA  8.9  8.9  9.2       RECOMMENDATIONS AND DISCHARGE PLANNING     1. Pending tests/procedures/ Plan of Care or Other Needs: continue steroids, blood sugars     2. Discharge plan for patient and Needs/Barriers: Home    3. Estimated Discharge Date: 10/12/18 Posted on Whiteboard in [facility] Room: yes      4. The patient's care plan was reviewed with the oncoming nurse. \"HEALS\" SAFETY CHECK      Fall Risk    Total Score: 1    Safety Measures: Safety Measures: Bed/Chair alarm on, Bed/Chair-Wheels locked, Bed in low position, Call light within reach, Gripper socks    A safety check occurred in the patient's room between off going nurse and oncoming nurse listed above.     The safety check included the below items  Area Items   H  High Alert Medications - Verify all high alert medication drips (heparin, PCA, etc.)   E  Equipment - Suction is set up for ALL patients (with yanker)  - Red plugs utilized for all equipment (IV pumps, etc.)  - WOWs wiped down at end of shift.  - Room stocked with oxygen, suction, and other unit-specific supplies   A  Alarms - Bed alarm is set for fall risk patients  - Ensure chair alarm is in place and activated if patient is up in a chair   L  Lines - Check IV for any infiltration  - Dean bag is empty if patient has a Dean   - Tubing and IV bags are labeled   S  Safety   - Room is clean, patient is clean, and equipment is clean. - Hallways are clear from equipment besides carts. - Fall bracelet on for fall risk patients  - Ensure room is clear and free of clutter  - Suction is set up for ALL patients (with yanker)  - Hallways are clear from equipment besides carts.    - Isolation precautions followed, supplies available outside room, sign posted     Wendy Elise RN

## 2018-10-10 NOTE — PROGRESS NOTES
Intern Progress Note  [facility]       Patient: Dg Laureano MRN: [id number]  CSN: [id number]    YOB: 1960  Age: 60 y.o. Sex: female    DOA: 10/7/2018 LOS:  LOS: 3 days                    Subjective:     Acute events: No acute events overnight. Patient feels like she is breathing better and is at \"80%\" of her baseline. Thinks she will be good to go home tomorrow after having a discussion with her pulmonologist.     Review of Systems   Constitutional: Negative for chills and fever. Respiratory: Positive for cough and shortness of breath. Cardiovascular: Negative for chest pain. Gastrointestinal: Negative for abdominal pain, constipation, diarrhea, nausea and vomiting. Neurological: Negative for headaches. Objective:      Patient Vitals for the past 24 hrs:   Temp Pulse Resp BP SpO2   10/10/18 1109 97.6 °F (36.4 °C) 69 16 156/84 97 %   10/10/18 1101 - - - - 96 %   10/10/18 0940 - - - - 96 %   10/10/18 0741 98 °F (36.7 °C) 60 16 152/80 96 %   10/10/18 0356 97.4 °F (36.3 °C) 60 20 118/70 100 %   10/09/18 2306 97.1 °F (36.2 °C) 73 20 118/60 99 %   10/09/18 2020 - - - - 95 %   10/09/18 1958 97.6 °F (36.4 °C) 73 20 128/74 95 %   10/09/18 1520 96.7 °F (35.9 °C) 70 18 123/71 97 %         Intake/Output Summary (Last 24 hours) at 10/10/18 1139  Last data filed at 10/10/18 0107   Gross per 24 hour   Intake              600 ml   Output                1 ml   Net              599 ml       Physical Exam:   General:  Alert and Responsive and in No acute distress. HEENT: Conjunctiva pink, sclera anicteric. EOMI. Pharynx moist, nonerythematous. Moist mucous membranes. CV:  RRR, no m/r/g  RESP:  Unlabored breathing. Decreased breath sounds diffusely but LCTA w/ no wheeze, rales, or rhonchi. Equal expansion bilaterally. ABD:  Soft, nontender, nondistended. No hepatosplenomegaly. No suprapubic tenderness. No CVA tenderness.   MS:  No joint deformity or instability. No atrophy. Neuro:  5/5 strength bilateral upper extremities and lower extremities. A+Ox3. Ext:  No edema. 2+ radial and dp pulses bilaterally. Skin:  No rashes, lesions, or ulcers. Good turgor.     Lab/Data Reviewed:  CMP:   Lab Results   Component Value Date/Time     10/10/2018 02:20 AM    K 4.5 10/10/2018 02:20 AM     10/10/2018 02:20 AM    CO2 27 10/10/2018 02:20 AM    AGAP 6 10/10/2018 02:20 AM     (H) 10/10/2018 02:20 AM    BUN 20 (H) 10/10/2018 02:20 AM    CREA 1.03 10/10/2018 02:20 AM    GFRAA >60 10/10/2018 02:20 AM    GFRNA 55 (L) 10/10/2018 02:20 AM    CA 8.9 10/10/2018 02:20 AM     CBC:   Lab Results   Component Value Date/Time    WBC 11.7 10/10/2018 02:20 AM    HGB 12.3 10/10/2018 02:20 AM    HCT 36.5 10/10/2018 02:20 AM     10/10/2018 02:20 AM        Scheduled Medications Reviewed:  Current Facility-Administered Medications   Medication Dose Route Frequency    predniSONE (DELTASONE) tablet 40 mg  40 mg Oral DAILY WITH BREAKFAST    azithromycin (ZITHROMAX) tablet 250 mg  250 mg Oral DAILY    lisinopril (PRINIVIL, ZESTRIL) tablet 20 mg  20 mg Oral ACB/HS    budesonide (PULMICORT) 1,000 mcg/2 mL nebulizer susp  1,000 mcg Nebulization BID RT    arformoterol (BROVANA) neb solution 15 mcg  15 mcg Nebulization BID RT    tiotropium (SPIRIVA) inhalation capsule 18 mcg  1 Cap Inhalation DAILY    aspirin delayed-release tablet 81 mg  81 mg Oral DAILY    famotidine (PEPCID) tablet 20 mg  20 mg Oral BID    fluticasone (FLONASE) 50 mcg/actuation nasal spray 2 Spray  2 Spray Both Nostrils DAILY    pantoprazole (PROTONIX) tablet 40 mg  40 mg Oral DAILY    enoxaparin (LOVENOX) injection 40 mg  40 mg SubCUTAneous Q24H    montelukast (SINGULAIR) tablet 10 mg  10 mg Oral QHS    insulin glargine (LANTUS) injection 30 Units  30 Units SubCUTAneous QHS    insulin lispro (HUMALOG) injection   SubCUTAneous AC&HS       Assessment/Plan   60 y.o. female with PMH COPD on home O2, HTN, DM, Diastolic CHF, SHERRIE now admitted with worsening sob and cough. Now being treated for COPD exacerbation.       Asthma-COPD Overlap syndrome - Improving  - Continue Brovana and Pulmicort nebulizers  - methylprednisolone 60 mg q6hr   - Switch Levaquin 750 mg daily to chronic azithromycin therapy, 250 mg daily for 12 weeks per pulmonology    - continue supplemental O2   - continue Singulair   - continue home flonase  - CPAP at night   - daily BMP, CBC  - pulmonology consulted - appreciate recommendations      Diastolic CHF  Stable on exam, no JVD, no peripheral edema. BNP 46. SOB and orthopnea likely in setting of current COPD exacerbation. Followed by Dr. Vicente Quinteros cardiology. Most recent echo 7/7/18 EF 66-70% with mild concentric LV hypertrophy. - continue telemetry   - continue aspirin   - continue lisinopril 40 mg daily       HTN   BP elevated to 101A systolic on admission. Improved to 140-150/70s  - continue home lisinopril 40 mg daily       DM -   Blood glucose elevated in setting of steroid use. - continue glargine 30 U nightly   - SSI       Frontal sinus osteoma  Found on CT scan of sinuses during admission in July 2018. Non-obstructing. Patient followed by [facility]. Next appointment in November. - continue outpatient follow up with ENT      SHERRIE  - CPAP at night       GERD  - continue home Protonix and pepcid, and simethicone       Diet: Diabetic  DVT Prophylaxis: lovenox  Code Status: Full  Point of Contact: Staci Richardson (daughter) [phone]      Disposition and anticipated LOS: 3 midnights. Plan to D/C home tomorrow.       MD Joe Grijalva 54 PGY-1  10/10/18 11:39 AM

## 2018-10-10 NOTE — PROGRESS NOTES
Room air sats 96%. HR 72 at rest. Ambulating inhall 93% , . Return to recliner and recovering HR 95, Oxygen sats 94%. Lefft oxygen off at this time. Inst patient to call if feeling SOB.

## 2018-10-11 ENCOUNTER — HOME HEALTH ADMISSION (OUTPATIENT)
Dept: HOME HEALTH SERVICES | Facility: HOME HEALTH | Age: 58
End: 2018-10-11

## 2018-10-11 VITALS
WEIGHT: 252 LBS | SYSTOLIC BLOOD PRESSURE: 127 MMHG | HEART RATE: 67 BPM | DIASTOLIC BLOOD PRESSURE: 76 MMHG | TEMPERATURE: 96.6 F | HEIGHT: 63 IN | RESPIRATION RATE: 18 BRPM | OXYGEN SATURATION: 96 % | BODY MASS INDEX: 44.65 KG/M2

## 2018-10-11 LAB
ANION GAP SERPL CALC-SCNC: 9 MMOL/L (ref 3–18)
BASOPHILS # BLD: 0 K/UL (ref 0–0.1)
BASOPHILS NFR BLD: 0 % (ref 0–2)
BUN SERPL-MCNC: 20 MG/DL (ref 7–18)
BUN/CREAT SERPL: 30 (ref 12–20)
CALCIUM SERPL-MCNC: 8.6 MG/DL (ref 8.5–10.1)
CHLORIDE SERPL-SCNC: 104 MMOL/L (ref 100–108)
CO2 SERPL-SCNC: 26 MMOL/L (ref 21–32)
CREAT SERPL-MCNC: 0.66 MG/DL (ref 0.6–1.3)
DIFFERENTIAL METHOD BLD: ABNORMAL
EOSINOPHIL # BLD: 0 K/UL (ref 0–0.4)
EOSINOPHIL NFR BLD: 0 % (ref 0–5)
ERYTHROCYTE [DISTWIDTH] IN BLOOD BY AUTOMATED COUNT: 13.5 % (ref 11.6–14.5)
GLUCOSE BLD STRIP.AUTO-MCNC: 112 MG/DL (ref 70–110)
GLUCOSE BLD STRIP.AUTO-MCNC: 267 MG/DL (ref 70–110)
GLUCOSE SERPL-MCNC: 186 MG/DL (ref 74–99)
HCT VFR BLD AUTO: 35.2 % (ref 35–45)
HGB BLD-MCNC: 12.2 G/DL (ref 12–16)
LYMPHOCYTES # BLD: 2.7 K/UL (ref 0.9–3.6)
LYMPHOCYTES NFR BLD: 30 % (ref 21–52)
MCH RBC QN AUTO: 30 PG (ref 24–34)
MCHC RBC AUTO-ENTMCNC: 34.7 G/DL (ref 31–37)
MCV RBC AUTO: 86.5 FL (ref 74–97)
MONOCYTES # BLD: 0.7 K/UL (ref 0.05–1.2)
MONOCYTES NFR BLD: 8 % (ref 3–10)
NEUTS SEG # BLD: 5.6 K/UL (ref 1.8–8)
NEUTS SEG NFR BLD: 62 % (ref 40–73)
PLATELET # BLD AUTO: 268 K/UL (ref 135–420)
PMV BLD AUTO: 9.4 FL (ref 9.2–11.8)
POTASSIUM SERPL-SCNC: 3.6 MMOL/L (ref 3.5–5.5)
RBC # BLD AUTO: 4.07 M/UL (ref 4.2–5.3)
SODIUM SERPL-SCNC: 139 MMOL/L (ref 136–145)
WBC # BLD AUTO: 9 K/UL (ref 4.6–13.2)

## 2018-10-11 PROCEDURE — 82962 GLUCOSE BLOOD TEST: CPT

## 2018-10-11 PROCEDURE — 74011250637 HC RX REV CODE- 250/637: Performed by: FAMILY MEDICINE

## 2018-10-11 PROCEDURE — 80048 BASIC METABOLIC PNL TOTAL CA: CPT | Performed by: FAMILY MEDICINE

## 2018-10-11 PROCEDURE — 74011636637 HC RX REV CODE- 636/637: Performed by: INTERNAL MEDICINE

## 2018-10-11 PROCEDURE — 74011250636 HC RX REV CODE- 250/636: Performed by: STUDENT IN AN ORGANIZED HEALTH CARE EDUCATION/TRAINING PROGRAM

## 2018-10-11 PROCEDURE — 94660 CPAP INITIATION&MGMT: CPT

## 2018-10-11 PROCEDURE — 74011250637 HC RX REV CODE- 250/637: Performed by: INTERNAL MEDICINE

## 2018-10-11 PROCEDURE — 90686 IIV4 VACC NO PRSV 0.5 ML IM: CPT | Performed by: STUDENT IN AN ORGANIZED HEALTH CARE EDUCATION/TRAINING PROGRAM

## 2018-10-11 PROCEDURE — 74011250637 HC RX REV CODE- 250/637: Performed by: STUDENT IN AN ORGANIZED HEALTH CARE EDUCATION/TRAINING PROGRAM

## 2018-10-11 PROCEDURE — 74011636637 HC RX REV CODE- 636/637: Performed by: STUDENT IN AN ORGANIZED HEALTH CARE EDUCATION/TRAINING PROGRAM

## 2018-10-11 PROCEDURE — 36415 COLL VENOUS BLD VENIPUNCTURE: CPT | Performed by: FAMILY MEDICINE

## 2018-10-11 PROCEDURE — 94640 AIRWAY INHALATION TREATMENT: CPT

## 2018-10-11 PROCEDURE — 85025 COMPLETE CBC W/AUTO DIFF WBC: CPT | Performed by: FAMILY MEDICINE

## 2018-10-11 PROCEDURE — 90471 IMMUNIZATION ADMIN: CPT

## 2018-10-11 PROCEDURE — 90732 PPSV23 VACC 2 YRS+ SUBQ/IM: CPT | Performed by: STUDENT IN AN ORGANIZED HEALTH CARE EDUCATION/TRAINING PROGRAM

## 2018-10-11 PROCEDURE — 74011000250 HC RX REV CODE- 250: Performed by: INTERNAL MEDICINE

## 2018-10-11 RX ORDER — PREDNISONE 20 MG/1
20 TABLET ORAL
Status: DISCONTINUED | OUTPATIENT
Start: 2018-10-16 | End: 2018-10-11

## 2018-10-11 RX ORDER — PREDNISONE 20 MG/1
40 TABLET ORAL
Status: DISCONTINUED | OUTPATIENT
Start: 2018-10-12 | End: 2018-10-11

## 2018-10-11 RX ORDER — AZITHROMYCIN 250 MG/1
250 TABLET, FILM COATED ORAL DAILY
Qty: 84 TAB | Refills: 0 | Status: SHIPPED | OUTPATIENT
Start: 2018-10-11 | End: 2019-01-03

## 2018-10-11 RX ORDER — PREDNISONE 20 MG/1
TABLET ORAL
Qty: 18 TAB | Refills: 0 | Status: SHIPPED | OUTPATIENT
Start: 2018-10-11 | End: 2018-10-30 | Stop reason: ALTCHOICE

## 2018-10-11 RX ADMIN — FAMOTIDINE 20 MG: 20 TABLET ORAL at 08:29

## 2018-10-11 RX ADMIN — ENOXAPARIN SODIUM 40 MG: 100 INJECTION SUBCUTANEOUS at 08:29

## 2018-10-11 RX ADMIN — INFLUENZA VIRUS VACCINE 0.5 ML: 15; 15; 15; 15 SUSPENSION INTRAMUSCULAR at 13:31

## 2018-10-11 RX ADMIN — ARFORMOTEROL TARTRATE 15 MCG: 15 SOLUTION RESPIRATORY (INHALATION) at 09:01

## 2018-10-11 RX ADMIN — PNEUMOCOCCAL VACCINE POLYVALENT 0.5 ML
25; 25; 25; 25; 25; 25; 25; 25; 25; 25; 25; 25; 25; 25; 25; 25; 25; 25; 25; 25; 25; 25; 25 INJECTION, SOLUTION INTRAMUSCULAR; SUBCUTANEOUS at 13:33

## 2018-10-11 RX ADMIN — AZITHROMYCIN 250 MG: 250 TABLET, FILM COATED ORAL at 10:35

## 2018-10-11 RX ADMIN — BUDESONIDE 1000 MCG: 1 SUSPENSION RESPIRATORY (INHALATION) at 09:00

## 2018-10-11 RX ADMIN — FLUTICASONE PROPIONATE 2 SPRAY: 50 SPRAY, METERED NASAL at 08:37

## 2018-10-11 RX ADMIN — PREDNISONE 40 MG: 20 TABLET ORAL at 08:29

## 2018-10-11 RX ADMIN — ASPIRIN 81 MG: 81 TABLET, COATED ORAL at 08:29

## 2018-10-11 RX ADMIN — LISINOPRIL 20 MG: 20 TABLET ORAL at 07:44

## 2018-10-11 RX ADMIN — INSULIN LISPRO 9 UNITS: 100 INJECTION, SOLUTION INTRAVENOUS; SUBCUTANEOUS at 11:30

## 2018-10-11 RX ADMIN — TIOTROPIUM BROMIDE 18 MCG: 18 CAPSULE ORAL; RESPIRATORY (INHALATION) at 09:00

## 2018-10-11 RX ADMIN — PANTOPRAZOLE SODIUM 40 MG: 40 TABLET, DELAYED RELEASE ORAL at 08:29

## 2018-10-11 NOTE — DIABETES MGMT
Glycemic Control Plan of Care    T2DM with current A1c of 7.7% (10/08/2018). See separate notes, 10/09/2018, for assessment of home diabetes management and educational needs. Home diabetes meds: Basaglar 30 units daily at bedtime and Novolog sliding scale insulin. Steroids induced hyperglycemia. POC BG range on 10/010/2018: 281-378 mg/dL. Noted IV Solumedrol changed to prednisone daily. POC BG report on 10/11/2018 at time of review: 112, 267 mg/dL. Prednisone 40 mg daily. Noted disch order for 10/11/2018. Recommendation(s):  1.) Continue inpatient glycemic monitoring and insulin orders until disch.  2.) Discussed with patient and she will continue to monitor BG at home and notify her medical provider for elevated levels. Assessment:  Patient is 60year old with past medical history including type 2 diabetes mellitus, COPD, hypertension, GERD, SHERRIE on Cpap, obesity, chronic diastolic CHF, and hyperlipidemia - was admitted on 10/07/2018 with report of shortness of breath. Noted:  Asthma - COPD overlap syndrome. T2DM with current A1c of 7.7% (10/09/2018). Most recent blood glucose values:    Results for Ledora Counter (MRN [id number]) as of 10/11/2018 12:12   Ref. Range 10/10/2018 08:04 10/10/2018 10:57 10/10/2018 15:19 10/10/2018 21:41   GLUCOSE,FAST - POC Latest Ref Range: 70 - 110 mg/dL 284 (H) 295 (H) 378 (H) 281 (H)     Results for Ledora Counter (MRN [id number]) as of 10/11/2018 12:12   Ref. Range 10/11/2018 08:11 10/11/2018 11:25   GLUCOSE,FAST - POC Latest Ref Range: 70 - 110 mg/dL 112 (H) 267 (H)     Current A1C: 7.7% (10/08/2018) which is equivalent to estimated average blood glucose of 174 mg/dL during the past 2-3 months. Current hospital diabetes medications:  Basal lantus insulin 30 units daily at bedtime. Correctional lispro insulin ACHS. Very resistant dose.     Total daily dose insulin requirement previous day: 10/10/2018:  Lantus: 45 units Lispro: 42 units  TDD: 87 units of insulin    Home diabetes medications: Patient reported on 10/09/2018:  Basaglar (lantus) insulin 30 units daily at bedtime. Novolog sliding scale insulin. Diet: Diabetic consistent carb regular; no concentrated sweets. Goals:  Blood glucose will be within target range of  mg/dL by 10/13/2018.     Education:  _X__  Refer to Diabetes Education Record: 10/09/2018             ___  Education not indicated at this time    Miguelito King RN [facility]  Pager: [phone]

## 2018-10-11 NOTE — ROUTINE PROCESS
Bedside and Verbal shift change report given to Estelita Sánchez / Melissa Lynch RN (oncoming nurse) by Ester Bailon RN (offgoing nurse). Report included the following information SBAR, Kardex, MAR and Recent Results.     SITUATION:    Code Status: Full Code  Reason for Admission: Acute exacerbation of chronic obstructive pulmonary disease (COPD) ([facility]   COPD exacerbation ([facility]     Hospital day: 4   Problem List:       Hospital Problems  Date Reviewed: 7/30/2018          Codes Class Noted POA    * (Principal)Acute exacerbation of chronic obstructive pulmonary disease (COPD) ([facility] ICD-10-CM: J44.1  ICD-9-CM: 491.21  10/7/2018 Unknown        [facility] ICD-10-CM: J44.1  ICD-9-CM: 491.21  2/8/2017 Unknown              BACKGROUND:    Past Medical History:   Past Medical History:   Diagnosis Date    Asthma     Chronic lung disease     COPD     Cystocele, midline     [facility]     GERD (gastroesophageal reflux disease)     Hidradenitis suppurativa     Hyperlipidemia     Hypertension     SHERRIE on CPAP     CPAP    Stress incontinence          Patient taking anticoagulants yes     ASSESSMENT:    Changes in Assessment Throughout Shift: no     Patient has Central Line: no Reasons if yes: n/a   Patient has Dean Cath: no Reasons if yes: n/a      Last Vitals:     Vitals:    10/11/18 0003 10/11/18 0115 10/11/18 0351 10/11/18 0414   BP: 140/79   122/68   Pulse: 61   63   Resp: 18   19   Temp: 97.8 °F (36.6 °C)   97.8 °F (36.6 °C)   SpO2: 98% 99% 99% 96%   Weight:       Height:            IV and DRAINS (will only show if present)   Peripheral IV 10/07/18 Right Antecubital-Site Assessment: Clean, dry, & intact     WOUND (if present)   Wound Type:  none   Dressing present Dressing Present : No   Wound Concerns/Notes:  none     PAIN    Pain Assessment    Pain Intensity 1: 0 (10/11/18 0544)    Pain Location 1: Head    Pain Intervention(s) 1: Refused    Patient Stated Pain Goal: 0  o Interventions for Pain:  none  o Intervention effective: n/a  o Time of last intervention: denies pain  o Reassessment Completed: yes      Last 3 Weights:  Last 3 Recorded Weights in this Encounter    10/07/18 1634   Weight: 114.3 kg (252 lb)     Weight change:      INTAKE/OUPUT    Current Shift:      Last three shifts: 10/09 1901 - 10/11 0700  In: 750 [P.O.:750]  Out: -      LAB RESULTS     Recent Labs      10/11/18   0225  10/10/18   0220  10/09/18   0134   WBC  9.0  11.7  14.0*   HGB  12.2  12.3  11.7*   HCT  35.2  36.5  34.9*   PLT  268  259  283        Recent Labs      10/11/18   0225  10/10/18   0220  10/09/18   0134   NA  139  137  136   K  3.6  4.5  4.2   GLU  186*  276*  297*   BUN  20*  20*  20*   CREA  0.66  1.03  1.05   CA  8.6  8.9  8.9       RECOMMENDATIONS AND DISCHARGE PLANNING     1. Pending tests/procedures/ Plan of Care or Other Needs: continue steroids, blood sugars     2. Discharge plan for patient and Needs/Barriers: Home    3. Estimated Discharge Date: 10/12/18 Posted on Whiteboard in [address] [place] [address] Room: yes      4. The patient's care plan was reviewed with the oncoming nurse. \"HEALS\" SAFETY CHECK      Fall Risk    Total Score: 1    Safety Measures: Safety Measures: Bed/Chair-Wheels locked, Bed in low position, Call light within reach    A safety check occurred in the patient's room between off going nurse and oncoming nurse listed above.     The safety check included the below items  Area Items   H  High Alert Medications - Verify all high alert medication drips (heparin, PCA, etc.)   E  Equipment - Suction is set up for ALL patients (with yanker)  - Red plugs utilized for all equipment (IV pumps, etc.)  - WOWs wiped down at end of shift.  - Room stocked with oxygen, suction, and other unit-specific supplies   A  Alarms - Bed alarm is set for fall risk patients  - Ensure chair alarm is in place and activated if patient is up in a chair   L  Lines - Check IV for any infiltration  - Dean bag is empty if patient has a Dean   - Tubing and IV bags are labeled   S  Safety   - Room is clean, patient is clean, and equipment is clean. - Hallways are clear from equipment besides carts. - Fall bracelet on for fall risk patients  - Ensure room is clear and free of clutter  - Suction is set up for ALL patients (with yanker)  - Hallways are clear from equipment besides carts.    - Isolation precautions followed, supplies available outside room, sign posted     Юлия Helton RN

## 2018-10-11 NOTE — DISCHARGE INSTRUCTIONS
DISCHARGE SUMMARY from Nurse    PATIENT INSTRUCTIONS:    After general anesthesia or intravenous sedation, for 24 hours or while taking prescription Narcotics:  · Limit your activities  · Do not drive and operate hazardous machinery  · Do not make important personal or business decisions  · Do  not drink alcoholic beverages  · If you have not urinated within 8 hours after discharge, please contact your surgeon on call. Report the following to your surgeon:  · Excessive pain, swelling, redness or odor of or around the surgical area  · Temperature over 100.5  · Nausea and vomiting lasting longer than 4 hours or if unable to take medications  · Any signs of decreased circulation or nerve impairment to extremity: change in color, persistent  numbness, tingling, coldness or increase pain  · Any questions    What to do at Home:  Recommended activity: Activity as tolerated    If you experience any of the following symptoms fever over 100, shortness of breath, severe pain, nausea, vomiting, dizziness, or any other symptoms that may cause problems, or any there conerns  *  Please give a list of your current medications to your Primary Care Provider. Patient armband removed and shredded    MyChart Activation    Thank you for requesting access to [facility]. Please follow the instructions below to securely access and download your online medical record. [facility] allows you to send messages to your doctor, view your test results, renew your prescriptions, schedule appointments, and more. How Do I Sign Up? 1. In your internet browser, go to [email or web address]  2. Click on the First Time User? Click Here link in the Sign In box. You will be redirect to the New Member Sign Up page. 3. Enter your [facility] Access Code exactly as it appears below. You will not need to use this code after youve completed the sign-up process. If you do not sign up before the expiration date, you must request a new code.     MyChart Access Code: [email or web address]  Expires: 10/14/2018  8:26 AM (This is the date your [facility] access code will )    4. Enter the last four digits of your Social Security Number (xxxx) and Date of Birth (mm/dd/yyyy) as indicated and click Submit. You will be taken to the next sign-up page. 5. Create a [facility] ID. This will be your [facility] login ID and cannot be changed, so think of one that is secure and easy to remember. 6. Create a [facility] password. You can change your password at any time. 7. Enter your Password Reset Question and Answer. This can be used at a later time if you forget your password. 8. Enter your e-mail address. You will receive e-mail notification when new information is available in [address] 9. Click Sign Up. You can now view and download portions of your medical record. 10. Click the Download Summary menu link to download a portable copy of your medical information. Additional Information    If you have questions, please visit the Frequently Asked Questions section of the [facility] website at [email or web address] Remember, [facility] is NOT to be used for urgent needs. For medical emergencies, dial 911. *  Please update this list whenever your medications are discontinued, doses are      changed, or new medications (including over-the-counter products) are added. *  Please carry medication information at all times in case of emergency situations. These are general instructions for a healthy lifestyle:    No smoking/ No tobacco products/ Avoid exposure to second hand smoke  Surgeon General's Warning:  Quitting smoking now greatly reduces serious risk to your health.     Obesity, smoking, and sedentary lifestyle greatly increases your risk for illness    A healthy diet, regular physical exercise & weight monitoring are important for maintaining a healthy lifestyle    You may be retaining fluid if you have a history of heart failure or if you experience any of the following symptoms:  Weight gain of 3 pounds or more overnight or 5 pounds in a week, increased swelling in our hands or feet or shortness of breath while lying flat in bed. Please call your doctor as soon as you notice any of these symptoms; do not wait until your next office visit. Recognize signs and symptoms of STROKE:    F-face looks uneven    A-arms unable to move or move unevenly    S-speech slurred or non-existent    T-time-call 911 as soon as signs and symptoms begin-DO NOT go       Back to bed or wait to see if you get better-TIME IS BRAIN. Warning Signs of HEART ATTACK     Call 911 if you have these symptoms:   Chest discomfort. Most heart attacks involve discomfort in the center of the chest that lasts more than a few minutes, or that goes away and comes back. It can feel like uncomfortable pressure, squeezing, fullness, or pain.  Discomfort in other areas of the upper body. Symptoms can include pain or discomfort in one or both arms, the back, neck, jaw, or stomach.  Shortness of breath with or without chest discomfort.  Other signs may include breaking out in a cold sweat, nausea, or lightheadedness. Don't wait more than five minutes to call 911 - MINUTES MATTER! Fast action can save your life. Calling 911 is almost always the fastest way to get lifesaving treatment. Emergency Medical Services staff can begin treatment when they arrive -- up to an hour sooner than if someone gets to the hospital by car. The discharge information has been reviewed with the patient. The patient verbalized understanding. Discharge medications reviewed with the patient and appropriate educational materials and side effects teaching were provided. ___________________________________________________________________________________________________________________________________     Learning About COPD, Asthma, and Air Pollution  How does air pollution affect COPD and asthma?     When you have COPD or asthma, air pollution may make your symptoms worse. If it does, it means that air pollution is a trigger for you. It is important to know what your triggers are and how to deal with them. If air pollution is a trigger for you, you need to learn about air quality and pay attention to weather forecasts that include how bad the air is expected to be. How can you manage a flare-up caused by air pollution? · Do not panic. Quick treatment at home may help you prevent serious breathing problems. · Take your medicines exactly as your doctor tells you. ¨ Use your quick-relief inhaler as directed by your doctor. If your symptoms do not get better after you use your medicine, have someone take you to the emergency room. Call an ambulance if necessary. ¨ With inhaled medicines, a spacer or a nebulizer may help you get more medicine to your lungs. Ask your doctor or pharmacist how to use them properly. Practice using the spacer in front of a mirror before you have a flare-up. This may help you get the medicine into your lungs quickly. ¨ If your doctor has given you steroid pills, take them as directed. ¨ Talk to your doctor if you have any problems with your medicine. What can you do to prevent flare-ups? · Try not to be outside when air pollution levels are high. Stay at home with your windows closed. · Do not smoke. This is the most important step you can take to prevent more damage to your lungs and prevent problems. If you already smoke, it is never too late to stop. If you need help quitting, talk to your doctor about stop-smoking programs and medicines. These can increase your chances of quitting for good. · Avoid secondhand smoke; cold, dry air; and high altitudes. · Take your daily medicines as prescribed. · Avoid colds and flu. ¨ Get a pneumococcal vaccine. ¨ Get a flu vaccine each year, as soon as it is available.  Ask those you live or work with to do the same, so they will not get the flu and infect you.  ¨ Try to stay away from people with colds or the flu. ¨ Wash your hands often. Follow-up care is a key part of your treatment and safety. Be sure to make and go to all appointments, and call your doctor if you are having problems. It's also a good idea to know your test results and keep a list of the medicines you take. Where can you learn more? Go to http://etelvina-olivier.info/. Enter  in the search box to learn more about \"Learning About COPD, Asthma, and Air Pollution. \"  Current as of: December 6, 2017  Content Version: 11.8  © 9421-1429 [employer]. Care instructions adapted under license by [facility] (which disclaims liability or warranty for this information). If you have questions about a medical condition or this instruction, always ask your healthcare professional. [place] [address] any warranty or liability for your use of this information. Chronic Obstructive Pulmonary Disease (COPD) Flare-Ups: Care Instructions  Your Care Instructions    Chronic obstructive pulmonary disease (COPD) is a lung disease that makes it hard to breathe. It is caused by damage to the lungs over many years, usually from smoking. COPD is often a mix of two diseases:  · Chronic bronchitis: The airways that carry air to the lungs (bronchial tubes) get inflamed and make a lot of mucus. This can narrow or block the airways. · Emphysema: In a healthy person, the tiny air sacs in the lungs are like balloons. As you breathe in and out, they get bigger and smaller to move air through your lungs. But with emphysema, these air sacs are damaged and lose their stretch. Less air gets in and out of the lungs. Many people with COPD have attacks called flare-ups or exacerbations. This is when your usual symptoms quickly get worse and stay worse. The doctor has checked you carefully. But problems can develop later.  If you notice any problems or new symptoms, get medical treatment right away. Follow-up care is a key part of your treatment and safety. Be sure to make and go to all appointments, and call your doctor if you are having problems. It's also a good idea to know your test results and keep a list of the medicines you take. How can you care for yourself at home? · Be safe with medicines. Take your medicines exactly as prescribed. Call your doctor if you think you are having a problem with your medicine. You may be taking medicines such as:  ¨ Bronchodilators. These help open your airways and make breathing easier. ¨ Corticosteroids. These reduce airway inflammation. They may be given as pills, in a vein, or in an inhaled form. You may go home with pills in addition to an inhaler that you already use. · A spacer may help you get more inhaled medicine to your lungs. Ask your doctor or pharmacist if a spacer is right for you. If it is, ask how to use it properly. · If your doctor prescribed antibiotics, take them as directed. Do not stop taking them just because you feel better. You need to take the full course of antibiotics. · If your doctor prescribed oxygen, use the flow rate your doctor has recommended. Do not change it without talking to your doctor first.  · Do not smoke. Smoking makes COPD worse. If you need help quitting, talk to your doctor about stop-smoking programs and medicines. These can increase your chances of quitting for good. When should you call for help? Call 911 anytime you think you may need emergency care.  For example, call if:    · You have severe trouble breathing.    Call your doctor now or seek immediate medical care if:    · You have new or worse trouble breathing.     · Your coughing or wheezing gets worse.     · You cough up dark brown or bloody mucus (sputum).     · You have a new or higher fever.    Watch closely for changes in your health, and be sure to contact your doctor if:    · You notice more mucus or a change in the color of your mucus.     · You need to use your antibiotic or steroid pills.     · You do not get better as expected. Where can you learn more? Go to http://etelvina-olivier.info/. Enter Q703 in the search box to learn more about \"Chronic Obstructive Pulmonary Disease (COPD) Flare-Ups: Care Instructions. \"  Current as of: December 6, 2017  Content Version: 11.8  © 9832-3517 [employer]. Care instructions adapted under license by [employer] (which disclaims liability or warranty for this information). If you have questions about a medical condition or this instruction, always ask your healthcare professional. [address] any warranty or liability for your use of this information.

## 2018-10-11 NOTE — HOME CARE
[facility] received [facility] referral for COPD - patient noted discharged before liaisons could speak with her. This nurse did reach the patient by phone to verify demographic and DME information. Referral called to BURKE Gibson LPN intake nurse, for completion and visit scheduling - S. Vivianne Denver LPN

## 2018-10-11 NOTE — PROGRESS NOTES
*ATTENTION:  This note has been created by a medical student for educational purposes only. Please do not refer to the content of this note for clinical decision-making, billing, or other purposes. Please see attending physicians note to obtain clinical information on this patient. *     Progress Note  [facility] EVMS Service    Patient: Jared Gordillo MRN: [id number]   SSN: [id number]  YOB: 1960   Age: 60 y.o. Sex: female      Admit Date: 10/7/2018    LOS: 4 days   Chief Complaint   Patient presents with    Shortness of Breath       Subjective:     Overnight, the night team did not receive any calls. This morning, the pt says she is doing well and \"thinks she is more than 80% better\". She still has been coughing some. She was able to get up and walk around a little yesterday. Her appetite has been good. She denies any SOB, chest pain, N/V, fevers, chills, or headaches. Review of Systems:  Constitutional: Negative for fever, chills and diaphoresis. HENT: Negative for sore throat. Respiratory: Endorses cough. Denies hemoptysis. Cardiovascular: Negative for chest pain and palpitations. Gastrointestinal: Negative for nausea and vomiting. Musculoskeletal: Negative for myalgias and joint pain. Skin: Negative for itching and rash. Neurological: Negative for dizziness and headaches. Objective:     Vitals:  Visit Vitals    /73 (BP 1 Location: Right arm, BP Patient Position: At rest)    Pulse (!) 58    Temp 97.1 °F (36.2 °C)    Resp 18    Ht 5' 3\" (1.6 m)    Wt 114.3 kg (252 lb)    SpO2 100%    BMI 44.64 kg/m2       Physical Exam:   General appearance: alert, cooperative, no distress, appears stated age  Lungs: Mild wheezing in bilateral lower lobes, improved from yesterday. Good aeration throughout lobes. Heart: regular rate and rhythm, S1, S2 normal, no murmur, click, rub or gallop  Abdomen: soft, non-tender.  Bowel sounds normal. No masses,  no organomegaly  Pulses: 2+ and symmetric radial and distal pulses bilaterally  Skin: Skin color, texture, turgor normal. No rashes or lesions  Neuro:  normal without focal findings; mental status, speech normal, alert and oriented x3    Intake and Output:  Current Shift: 10/11 0701 - 10/11 1900  In: 580 [P.O.:580]  Out: -   Last three shifts: 10/09 1901 - 10/11 0700  In: 750 [P.O.:750]  Out: -     Lab/Data Review:  Recent Results (from the past 12 hour(s))   GLUCOSE, POC    Collection Time: 10/10/18  9:41 PM   Result Value Ref Range    Glucose (POC) 281 (H) 70 - 110 mg/dL   CBC WITH AUTOMATED DIFF    Collection Time: 10/11/18  2:25 AM   Result Value Ref Range    WBC 9.0 4.6 - 13.2 K/uL    RBC 4.07 (L) 4.20 - 5.30 M/uL    HGB 12.2 12.0 - 16.0 g/dL    HCT 35.2 35.0 - 45.0 %    MCV 86.5 74.0 - 97.0 FL    MCH 30.0 24.0 - 34.0 PG    MCHC 34.7 31.0 - 37.0 g/dL    RDW 13.5 11.6 - 14.5 %    PLATELET 687 538 - 996 K/uL    MPV 9.4 9.2 - 11.8 FL    NEUTROPHILS 62 40 - 73 %    LYMPHOCYTES 30 21 - 52 %    MONOCYTES 8 3 - 10 %    EOSINOPHILS 0 0 - 5 %    BASOPHILS 0 0 - 2 %    ABS. NEUTROPHILS 5.6 1.8 - 8.0 K/UL    ABS. LYMPHOCYTES 2.7 0.9 - 3.6 K/UL    ABS. MONOCYTES 0.7 0.05 - 1.2 K/UL    ABS. EOSINOPHILS 0.0 0.0 - 0.4 K/UL    ABS.  BASOPHILS 0.0 0.0 - 0.1 K/UL    DF AUTOMATED     METABOLIC PANEL, BASIC    Collection Time: 10/11/18  2:25 AM   Result Value Ref Range    Sodium 139 136 - 145 mmol/L    Potassium 3.6 3.5 - 5.5 mmol/L    Chloride 104 100 - 108 mmol/L    CO2 26 21 - 32 mmol/L    Anion gap 9 3.0 - 18 mmol/L    Glucose 186 (H) 74 - 99 mg/dL    BUN 20 (H) 7.0 - 18 MG/DL    Creatinine 0.66 0.6 - 1.3 MG/DL    BUN/Creatinine ratio 30 (H) 12 - 20      GFR est AA >60 >60 ml/min/1.73m2    GFR est non-AA >60 >60 ml/min/1.73m2    Calcium 8.6 8.5 - 10.1 MG/DL   GLUCOSE, POC    Collection Time: 10/11/18  8:11 AM   Result Value Ref Range    Glucose (POC) 112 (H) 70 - 110 mg/dL       Key Findings or tests:     Telemetry NONE   Oxygen NONE     Assessment and Plan:     Ms. Stan Farfan is a 60year old female with a PMH of COPD on home O2 at 2 L/min, HTN, DM, HFpEF, SHERRIE, and GERD who was admitted for a COPD exacerbation.     1. COPD exacerbation: Had a PFT on 11/2016 with FEV1/FVC 0.56 (71% of predicted) and DLCO 44%. This is her 4th admission for COPD exacerbation this year. She is on continuous O2 therapy at home of 2 L/min. 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 She is also a former tobacco user who quit in 2006 with a 30 year pack hx.  -Pulm consult: asthma-COPD overlap syndrome   -Recs: start Azithromax 250 mg daily for 12 weeks, not a candidate for biologics, control triggers (rhinitis, GERD, environmental allergens), continue Flonase, can resume Advair and Incruse Ellipta at discharge, Albuterol as rescue inhaler   -f/u any further pulm recs  -Sputum culture: negative  -Continue Brovana and Pulmicort nebulizers BID  -Continue Methylprednisone 60 mg q6h  -Continue Levaquin 750 mg daily for 5 days (day 4/5 today)  -Continue Singulair 10 mg nightly  -Continue home O2 - 2L NC with activity   -Pt has been off supplemental O2 for the past day and has been doing well - continue to monitor O2 sats   -Consider walking test for determining O2 requirements at home prior to d/c  -Continue Flonase  -Consider d/c today, f/u with pulm     2. HTN: Elevated on admission. Pt says she sometimes skips her Lisinopril dose if her BP is good at home. /73 this morning.  -Continue Lisinopril 40 mg daily     3. DM: A1c 7.9% in 7/2018.  -Continue Lantus 30u nightly, Lispro 4x/d before meals and nightly  -Monitor blood glucose readings, currently on steroid therapy     4. HFpEF: Echo 7/2018 showed LVEF 66-70% with mild concentric hypertrophy. Declined statin therapy earlier this year. Sees Dr. Alma Cardona with cardiology.  -Continue Aspirin 81 mg daily     5. SHERRIE: Had a sleep study done in 8/2016.  Follows up with pulm outpatient.  -Continue CPAP nightly     6.  GERD  -Continue Famotidine 20 mg BID and Pantoprazole 40 mg daily     Diet  Diabetic   DVT Prophylax  Lovenox   Code status  Full         Meri Alarcon , MS4   October 11, 2018

## 2018-10-11 NOTE — PROGRESS NOTES
Pt. Received flu and pneumonia vaccine and instructions given. Discharge orders given with verbal understanding. Pt. Transferred to main entrance.

## 2018-10-11 NOTE — PROGRESS NOTES
Transition of Care ([facility]) Plan:  Patient is being d/c home. FOC signed for [facility]. Referral sent and staff notified. Daughter states she will arrange for the personal care aid. [facility] Transportation:       How is patient being transported at discharge? daughterr is picking up     When? This PM     Is transport scheduled? Follow-up appointment and transportation:     PCP? Specialist?     Time/Date? Who is transporting to the follow-up appointment? Is transport for follow up appointment scheduled? Communication plan (with patient/family): Who is being called? Patient or Next of Kin? Responsible party? What number(s) is to be used? What service provider is calling for [facility] services? When are they calling? Readmission Risk?   (Green/Low; Yellow/Moderate; Red/High):

## 2018-10-11 NOTE — DISCHARGE SUMMARY
DISCHARGE SUMMARY  [facility] Service    Patient: Tess Gutierrez MRN: [id number]   SSN: [id number]  YOB: 1960   Age: 60 y.o. Sex: female    Admit date: 10/7/2018 Discharge date: 10/11/2018     Inpatient care:   Problem that led to hospitalization: Acute exacerbation of chronic obstructive pulmonary disease (COPD) ([facility]    Final diagnoses:  Patient Active Problem List   Diagnosis Code    Essential hypertension, benign I10    Diabetes mellitus, type 2 ([facility] E11.9    Esophageal reflux K21.9    SHERRIE on CPAP G47.33, Z99.89    COPD (chronic obstructive pulmonary disease) ([facility] J44.9    Allergic rhinitis J30.9    COPD with acute exacerbation ([facility]) J44.1    Obesity, Class III, BMI 40-49.9 (morbid obesity) ([facility]) E66.01    Chest pain R07.9    Dyspnea R06.00    COPD exacerbation ([facility]) J44.1    Acute exacerbation of COPD with asthma ([facility]) J44.1, S13.804    Diastolic CHF, chronic ([facility]) I50.32    Diverticulosis K57.90    COPD with acute bronchitis ([facility]) J44.0, J20.9    Hyperlipidemia E78.5    Type 2 diabetes with nephropathy ([facility]) E11.21    Bilateral carotid bruits R09.89    Palpitations R00.2    Acute exacerbation of chronic obstructive pulmonary disease (COPD) ([facility]) J44.1       Key findings or test results: All Micro Results     Procedure Component Value Units Date/Time    CULTURE, RESPIRATORY/SPUTUM/BRONCH Authur Albe [[id number]] Collected:  10/07/18 2130    Order Status:  Canceled Specimen:  Sputum from Sputum         Consults: Pulmonology     Procedures: none    Brief hospital course:     Tess Gutierrez is a 60 y.o. female with PMH 60 y.o. female with PMH COPD on home O2, HTN, DM, Diastolic CHF, SHERRIE admitted to [facility] on 10/7/2018 with worsening sob and cough.     Patient had several days of worsening SOB, CHAUDHARY, and cough prior to admission. Typically on home O2 of 2L.  Recently seen at [facility] for worsening COPD and had medications optimized and started on prednisone taper. Patient reports adherence with COPD medications but states she sometimes does not take her lisinopril if her BPs are ok. She reports elevated -190/100s in the days leading up to admission. Pt reports cough that started several days ago that is productive of yellow sputum. She denies fevers or chills. Notes that family members were sick approximately one month ago. Patient sleeps in a recliner or with 4 pillows at night usually. ED course: Started on duo-nebs, given methylprednisolone 125 mg IV, 2 mg mg IV, and started on supplemental oxygen. ABG pH 7.47 PCO2 40.4, PO2 149, HCO3 29.7. Troponin's neg x1. CXR showing likely atelectasis > infiltrate.     Inpatient course:   Asthma-COPD Overlap syndrome  -Methylprednisolone 60 mg q6hr   -Treated w/ Levaquin 750 mg daily for 3 days.   -Started chronic azithromycin therapy 10/10/2018, 250 mg daily for 12 weeks per pulmonology, end date on January 2, 2018.  -Received influenza and pneumovax vaccinations prior to discharge. -D/c home on 2L O2, and pta Singular, Flonase, Advair 100-50, home CPAP, and prednisone taper (40 mg daily for 4 days, then 20 mg daily for 10 days)  -Plan to switch Incruse Ellipta to Spiriva as patient reports she feels like she was better controlled on the Spiriva.       Diastolic CHF  Stable on exam, no JVD, no peripheral edema. BNP 46. SOB and orthopnea likely in setting of current COPD exacerbation. Followed by Dr. Olive Hernandez cardiology. Most recent echo 7/7/18 EF 66-70% with mild concentric LV hypertrophy. -D/c home on pta aspirin and lisinopril 40 mg daily       HTN   BP elevated to 111P systolic on admission. Improved to 140-150/70s  -D/c home on pta lisinopril 40 mg daily       DM -   Blood glucose elevated in setting of steroid use.  BS peak of 378.   -Treated w/ an additional 15 additional units of Lantus x2 nights  -D/c home on pta glargine 30 U nightly and Novolog SSI.      Frontal sinus osteoma  Found on CT scan of sinuses during admission in July 2018. Non-obstructing. Patient followed by [facility]NT. Next appointment in November.       SHERRIE  -CPAP at night       GERD  -D/c on pta protonix, pepcid, and simethicone     Discharge Exam:  General:  Alert and Responsive and in No acute distress. HEENT: Conjunctiva pink, sclera anicteric. EOMI. Pharynx moist, nonerythematous. Moist mucous membranes. CV:  RRR, no m/r/g  RESP:  Unlabored breathing. LCTA w/ no wheeze, rales, or rhonchi. Equal expansion bilaterally. ABD:  Soft, nontender, nondistended. No hepatosplenomegaly. MS:  No joint deformity or instability. No atrophy. Neuro:  5/5 strength bilateral upper extremities and lower extremities. A+Ox3. Ext:  No edema. 2+ radial and dp pulses bilaterally. Skin:  No rashes, lesions, or ulcers. Good turgor. Discharge Vitals:  Patient Vitals for the past 24 hrs:   Temp Pulse Resp BP SpO2   10/11/18 1106 96.6 °F (35.9 °C) 67 18 127/76 96 %   10/11/18 0743 97.1 °F (36.2 °C) (!) 58 18 132/73 100 %   10/11/18 0414 97.8 °F (36.6 °C) 63 19 122/68 96 %   10/11/18 0351 - - - - 99 %   10/11/18 0115 - - - - 99 %   10/11/18 0003 97.8 °F (36.6 °C) 61 18 140/79 98 %   10/10/18 2035 98.7 °F (37.1 °C) - 17 142/72 97 %   10/10/18 2026 - - - - 94 %       Discharge Labs:  Recent Results (from the past 24 hour(s))   GLUCOSE, POC    Collection Time: 10/10/18  9:41 PM   Result Value Ref Range    Glucose (POC) 281 (H) 70 - 110 mg/dL   CBC WITH AUTOMATED DIFF    Collection Time: 10/11/18  2:25 AM   Result Value Ref Range    WBC 9.0 4.6 - 13.2 K/uL    RBC 4.07 (L) 4.20 - 5.30 M/uL    HGB 12.2 12.0 - 16.0 g/dL    HCT 35.2 35.0 - 45.0 %    MCV 86.5 74.0 - 97.0 FL    MCH 30.0 24.0 - 34.0 PG    MCHC 34.7 31.0 - 37.0 g/dL    RDW 13.5 11.6 - 14.5 %    PLATELET 419 825 - 490 K/uL    MPV 9.4 9.2 - 11.8 FL    NEUTROPHILS 62 40 - 73 %    LYMPHOCYTES 30 21 - 52 %    MONOCYTES 8 3 - 10 %    EOSINOPHILS 0 0 - 5 %    BASOPHILS 0 0 - 2 %    ABS. NEUTROPHILS 5.6 1.8 - 8.0 K/UL    ABS. LYMPHOCYTES 2.7 0.9 - 3.6 K/UL    ABS. MONOCYTES 0.7 0.05 - 1.2 K/UL    ABS. EOSINOPHILS 0.0 0.0 - 0.4 K/UL    ABS. BASOPHILS 0.0 0.0 - 0.1 K/UL    DF AUTOMATED     METABOLIC PANEL, BASIC    Collection Time: 10/11/18  2:25 AM   Result Value Ref Range    Sodium 139 136 - 145 mmol/L    Potassium 3.6 3.5 - 5.5 mmol/L    Chloride 104 100 - 108 mmol/L    CO2 26 21 - 32 mmol/L    Anion gap 9 3.0 - 18 mmol/L    Glucose 186 (H) 74 - 99 mg/dL    BUN 20 (H) 7.0 - 18 MG/DL    Creatinine 0.66 0.6 - 1.3 MG/DL    BUN/Creatinine ratio 30 (H) 12 - 20      GFR est AA >60 >60 ml/min/1.73m2    GFR est non-AA >60 >60 ml/min/1.73m2    Calcium 8.6 8.5 - 10.1 MG/DL   GLUCOSE, POC    Collection Time: 10/11/18  8:11 AM   Result Value Ref Range    Glucose (POC) 112 (H) 70 - 110 mg/dL   GLUCOSE, POC    Collection Time: 10/11/18 11:25 AM   Result Value Ref Range    Glucose (POC) 267 (H) 70 - 110 mg/dL       Postdischarge care and patient self-management:      Discharge Medication List as of 10/11/2018  1:42 PM      START taking these medications    Details   azithromycin (ZITHROMAX) 250 mg tablet Take 1 Tab by mouth daily for 84 days. , Print, Disp-84 Tab, R-0      predniSONE (DELTASONE) 20 mg tablet Take 2 tablets daily with breakfast for 4 days (last 40 mg dose on Oct 15), then 1 tablet daily with breakfast for 10 additional days. , Print, Disp-18 Tab, R-0         CONTINUE these medications which have NOT CHANGED    Details   predniSONE (STERAPRED DS) 10 mg dose pack As per pack, Print, Disp-48 Tab, R-0      insulin glargine (LANTUS,BASAGLAR) 100 unit/mL (3 mL) inpn 30 Units by SubCUTAneous route nightly.  Indications: type 2 diabetes mellitus, Print, Disp-28 Units, R-0      NOVOLOG FLEXPEN U-100 INSULIN 100 unit/mL inpn Continue home Sliding scale insulin as prior to admission, Print, Disp-1 Pen, R-0, ERLINDA      fluticasone-salmeterol (ADVAIR DISKUS) 100-50 mcg/dose diskus inhaler Take 1 Puff by inhalation every twelve (12) hours. , No Print, Disp-1 Inhaler, R-0      umeclidinium (INCRUSE ELLIPTA) 62.5 mcg/actuation inhaler Take 1 Puff by inhalation daily. Indications: BRONCHOSPASM PREVENTION WITH COPD, Normal, Disp-1 Inhaler, R-0      ipratropium (ATROVENT) 0.02 % soln 2.5 mL by Nebulization route four (4) times daily as needed. Indications: BRONCHOSPASM PREVENTION WITH COPD, Print, Disp-30 Vial, R-0      albuterol (PROVENTIL VENTOLIN) 2.5 mg /3 mL (0.083 %) nebulizer solution 3 mL by Nebulization route every four (4) hours as needed for Wheezing. Indications: BRONCHOSPASM PREVENTION, Chronic Obstructive Pulmonary Disease, Print, Disp-30 Each, R-0      omeprazole (PRILOSEC) 40 mg capsule Take 40 mg by mouth daily. Indications: gastroesophageal reflux disease, Historical Med      lisinopril (PRINIVIL, ZESTRIL) 40 mg tablet Take 20 mg by mouth two (2) times a day. Indications: hypertension, Historical Med      simethicone (MYLICON) 80 mg chewable tablet Take 80 mg by mouth every six (6) hours as needed for Flatulence., Historical Med      cpap machine kit by Does Not Apply route nightly., Historical Med      OXYGEN-AIR DELIVERY SYSTEMS 2 L by Nasal route continuous. First Choice, Historical Med      aspirin delayed-release 81 mg tablet Take 81 mg by mouth daily. , Historical Med      famotidine (PEPCID) 20 mg tablet Take 20 mg by mouth two (2) times daily as needed., Historical Med      albuterol (PROVENTIL HFA, VENTOLIN HFA, PROAIR HFA) 90 mcg/actuation inhaler Take 2 Puffs by inhalation every four (4) hours as needed for Wheezing. For the next 2 days after hospital discharge, use your inhaler 4 times a day., Print, Disp-1 Inhaler, R-0      fluticasone (FLONASE) 50 mcg/actuation nasal spray 2 Sprays by Both Nostrils route daily.   , Historical Med      montelukast (SINGULAIR) 10 mg tablet Take 10 mg by mouth nightly., Historical Med      albuterol sulfate 90 mcg/actuation aepb Take 1 Puff by inhalation every four (4) hours., Print, Disp-1 Inhaler, R-0         STOP taking these medications       influenza vaccine 2018-19, 6 mos+,,PF, (FLUARIX QUAD/FLULAVAL QUAD) syrg injection Comments:   Reason for Stopping:         pneumococcal 23-valent (PNEUMOVAX 23) 25 mcg/0.5 mL injection Comments:   Reason for Stopping:               · Pending laboratory work and tests: none  · Condition at discharge and functional status: good and stable  · Diet at discharge Diabetic Diet  · Activities at discharge: Activity as tolerated  · Discharge destination: home  · Care needed: none  · Patient education and special instructions:COPD education  · Patients cognitive function: good    Outpatient care:   24-hour phone number for hospital physician: [phone] PFM     Name of the Caty Amos MD    Follow-up with: Follow-up Information     Follow up With Details Comments Elnora Cheadle, MD On 10/16/2018 @ 2:20 [address] [facility]  [phone]            Recommendations of subspecialty consultants: Pulmonary/Intensive care    Anticipated problems and suggested interventions:   -Started chronic azithromycin therapy 10/10/2018, 250 mg daily for 12 weeks per pulmonology, end date on January 2, 2018.  -Received influenza and pneumovax vaccinations prior to discharge. -D/c home on 2L O2, and pta Singular, Flonase, Advair 100-50, home CPAP  -D/c home on prednisone taper (40 mg daily for 4 days, then 20 mg daily for 10 days)  -Plan to switch Incruse Ellipta to Spiriva as patient reports she feels like she was better controlled on the Spiriva.      Signed,   MD Joe Bear 54, PGY1  10/11/18  5:38 PM

## 2018-10-16 ENCOUNTER — HOME CARE VISIT (OUTPATIENT)
Dept: HOME HEALTH SERVICES | Facility: HOME HEALTH | Age: 58
End: 2018-10-16

## 2018-10-17 ENCOUNTER — HOME CARE VISIT (OUTPATIENT)
Dept: HOME HEALTH SERVICES | Facility: HOME HEALTH | Age: 58
End: 2018-10-17

## 2018-10-30 ENCOUNTER — OFFICE VISIT (OUTPATIENT)
Dept: PULMONOLOGY | Age: 58
End: 2018-10-30

## 2018-10-30 VITALS
DIASTOLIC BLOOD PRESSURE: 78 MMHG | TEMPERATURE: 97.6 F | SYSTOLIC BLOOD PRESSURE: 130 MMHG | WEIGHT: 254 LBS | RESPIRATION RATE: 20 BRPM | OXYGEN SATURATION: 95 % | HEART RATE: 87 BPM | HEIGHT: 63 IN | BODY MASS INDEX: 45 KG/M2

## 2018-10-30 DIAGNOSIS — J44.9 CHRONIC OBSTRUCTIVE PULMONARY DISEASE, UNSPECIFIED COPD TYPE (HCC): Primary | ICD-10-CM

## 2018-10-30 DIAGNOSIS — J96.11 CHRONIC RESPIRATORY FAILURE WITH HYPOXIA (HCC): ICD-10-CM

## 2018-10-30 DIAGNOSIS — Z99.89 OSA ON CPAP: ICD-10-CM

## 2018-10-30 DIAGNOSIS — G47.33 OSA ON CPAP: ICD-10-CM

## 2018-10-30 NOTE — PATIENT INSTRUCTIONS
Continue Incruse 1 inhalation daily and remember to exhale fully before inhaling    Continue Advair 2 puffs twice daily and remember to wash mouth with water and spit it out after inhaling    Albuterol 2 puffs every 4 hours as needed or by nebulizer every 4 hours as needed but if you require albuterol too often to control respiratory symptoms for severe symptoms go to the emergency room    Continue Zithromax    Always call for symptoms such as increasing shortness of breath cough productive of discolored

## 2018-10-30 NOTE — PROGRESS NOTES
Chief Complaint   Patient presents with    COPD     hospital follow up ([facility]-10/7-10/11/2018); CXR 10/7/2018    Sleep Apnea    Other     Respiratory  Failure    Shortness of Breath       1. Have you been to the ER, urgent care clinic since your last visit? Hospitalized since your last visit? Yes When: 10/7-10/11/2018 Where: [facility] Reason for visit: COPD Exacerbation    2. Have you seen or consulted any other health care providers outside of the [address] [place] [address] since your last visit? Include any pap smears or colon screening. Yes Where: [facility], Dr. Johnnie Canela, ENT    [facility]  [address] [facility]. [address], [address] [address] [address]  [facility]  () [phone]      Simple walk test done in office today. Qualifying O2 sats:     O2 Sat at rest on room air is: 94 %, Pulse 86, SOB scale 3    Walked: 29   m on Room Air : 92 %, Pulse 91, SOB scale 3      68   m on Room Air : 93 %, Pulse 106, SOB scale 5      102 m on Room Air : 92 %, Pulse 117, SOB scale 7    O2 Sat at rest on Room Air is : 93 %, Pulse 115, SOB scale 7    Tech comments regarding testing: Patient did simple walk and walked a total of 102 meters on room air. VSS bu C/O SOB. Patient immediately placed her O2 back on when she sat down from her walk d/t shortness of breath. Dr. Luis Lebron notified.      Vicente Guo LPN

## 2018-10-31 ENCOUNTER — PATIENT OUTREACH (OUTPATIENT)
Dept: CASE MANAGEMENT | Age: 58
End: 2018-10-31

## 2018-10-31 NOTE — PROGRESS NOTES
The patient called [facility] regarding needing her UAI faxed to [facility], personal care agency. Her last UAI was done March 15 2018. As per Medicaid, a reassessment needs to be completed after 6 months from when the UAI was completed. The patient was upset, she states that she was just here in October 2018 and discharged 10/11/18. A UAI was not completed then (verified in EPAS). This NN explained to the patient that we cannot do a reassessment now since she's been out of the hospital for too long (2 weeks). Advised her to call her [facility]'s  to request a screening (UAI) to be completed. She remained upset because she states \"I told the  when I was there that I needed all this help, I don't understand why they didn't do it. \" This NN told her that I would discuss this with Mago Gr, case management manager and we would give her a call back. Andreina Hackett was notified as stated above. She stated that she would give the patient a call this afternoon, but she doesn't think that there's anything that we can do since she's been out of the hospital for too long for a reassessment to be done. Cody Velásquez, was also notified.

## 2018-11-14 ENCOUNTER — HOSPITAL ENCOUNTER (EMERGENCY)
Age: 58
Discharge: HOME | DRG: 191 | End: 2018-11-15
Attending: EMERGENCY MEDICINE
Payer: MEDICARE

## 2018-11-14 ENCOUNTER — APPOINTMENT (OUTPATIENT)
Dept: GENERAL RADIOLOGY | Age: 58
DRG: 191 | End: 2018-11-14
Attending: EMERGENCY MEDICINE
Payer: MEDICARE

## 2018-11-14 VITALS
OXYGEN SATURATION: 92 % | TEMPERATURE: 97.3 F | HEART RATE: 87 BPM | RESPIRATION RATE: 18 BRPM | DIASTOLIC BLOOD PRESSURE: 85 MMHG | SYSTOLIC BLOOD PRESSURE: 140 MMHG

## 2018-11-14 DIAGNOSIS — J44.1 COPD EXACERBATION (HCC): Primary | ICD-10-CM

## 2018-11-14 LAB
ALBUMIN SERPL-MCNC: 3.9 G/DL (ref 3.4–5)
ALBUMIN/GLOB SERPL: 1.1 {RATIO} (ref 0.8–1.7)
ALP SERPL-CCNC: 104 U/L (ref 45–117)
ALT SERPL-CCNC: 69 U/L (ref 13–56)
ANION GAP SERPL CALC-SCNC: 11 MMOL/L (ref 3–18)
AST SERPL-CCNC: 37 U/L (ref 15–37)
BASOPHILS # BLD: 0 K/UL (ref 0–0.1)
BASOPHILS NFR BLD: 1 % (ref 0–2)
BILIRUB SERPL-MCNC: 0.6 MG/DL (ref 0.2–1)
BNP SERPL-MCNC: 34 PG/ML (ref 0–900)
BUN SERPL-MCNC: 11 MG/DL (ref 7–18)
BUN/CREAT SERPL: 14 (ref 12–20)
CALCIUM SERPL-MCNC: 9.4 MG/DL (ref 8.5–10.1)
CHLORIDE SERPL-SCNC: 102 MMOL/L (ref 100–108)
CO2 SERPL-SCNC: 27 MMOL/L (ref 21–32)
CREAT SERPL-MCNC: 0.81 MG/DL (ref 0.6–1.3)
DIFFERENTIAL METHOD BLD: ABNORMAL
EOSINOPHIL # BLD: 0.1 K/UL (ref 0–0.4)
EOSINOPHIL NFR BLD: 1 % (ref 0–5)
ERYTHROCYTE [DISTWIDTH] IN BLOOD BY AUTOMATED COUNT: 13.7 % (ref 11.6–14.5)
GLOBULIN SER CALC-MCNC: 3.6 G/DL (ref 2–4)
GLUCOSE SERPL-MCNC: 168 MG/DL (ref 74–99)
HCT VFR BLD AUTO: 39.9 % (ref 35–45)
HGB BLD-MCNC: 14 G/DL (ref 12–16)
LYMPHOCYTES # BLD: 2 K/UL (ref 0.9–3.6)
LYMPHOCYTES NFR BLD: 34 % (ref 21–52)
MCH RBC QN AUTO: 30.6 PG (ref 24–34)
MCHC RBC AUTO-ENTMCNC: 35.1 G/DL (ref 31–37)
MCV RBC AUTO: 87.1 FL (ref 74–97)
MONOCYTES # BLD: 0.4 K/UL (ref 0.05–1.2)
MONOCYTES NFR BLD: 7 % (ref 3–10)
NEUTS SEG # BLD: 3.3 K/UL (ref 1.8–8)
NEUTS SEG NFR BLD: 57 % (ref 40–73)
PLATELET # BLD AUTO: 284 K/UL (ref 135–420)
PMV BLD AUTO: 9.1 FL (ref 9.2–11.8)
POTASSIUM SERPL-SCNC: 4 MMOL/L (ref 3.5–5.5)
PROT SERPL-MCNC: 7.5 G/DL (ref 6.4–8.2)
RBC # BLD AUTO: 4.58 M/UL (ref 4.2–5.3)
SODIUM SERPL-SCNC: 140 MMOL/L (ref 136–145)
TROPONIN I SERPL-MCNC: <0.02 NG/ML (ref 0–0.04)
TROPONIN I SERPL-MCNC: <0.02 NG/ML (ref 0–0.04)
WBC # BLD AUTO: 5.8 K/UL (ref 4.6–13.2)

## 2018-11-14 PROCEDURE — 94640 AIRWAY INHALATION TREATMENT: CPT

## 2018-11-14 PROCEDURE — 83880 ASSAY OF NATRIURETIC PEPTIDE: CPT

## 2018-11-14 PROCEDURE — 85025 COMPLETE CBC W/AUTO DIFF WBC: CPT

## 2018-11-14 PROCEDURE — 96374 THER/PROPH/DIAG INJ IV PUSH: CPT

## 2018-11-14 PROCEDURE — 74011000250 HC RX REV CODE- 250: Performed by: EMERGENCY MEDICINE

## 2018-11-14 PROCEDURE — 99284 EMERGENCY DEPT VISIT MOD MDM: CPT

## 2018-11-14 PROCEDURE — 93005 ELECTROCARDIOGRAM TRACING: CPT

## 2018-11-14 PROCEDURE — 71045 X-RAY EXAM CHEST 1 VIEW: CPT

## 2018-11-14 PROCEDURE — 84484 ASSAY OF TROPONIN QUANT: CPT

## 2018-11-14 PROCEDURE — 80053 COMPREHEN METABOLIC PANEL: CPT

## 2018-11-14 PROCEDURE — 74011250636 HC RX REV CODE- 250/636: Performed by: EMERGENCY MEDICINE

## 2018-11-14 PROCEDURE — 77030029684 HC NEB SM VOL KT MONA -A

## 2018-11-14 RX ORDER — IPRATROPIUM BROMIDE AND ALBUTEROL SULFATE 2.5; .5 MG/3ML; MG/3ML
3 SOLUTION RESPIRATORY (INHALATION)
Status: COMPLETED | OUTPATIENT
Start: 2018-11-14 | End: 2018-11-14

## 2018-11-14 RX ORDER — PREDNISONE 20 MG/1
40 TABLET ORAL DAILY
Qty: 10 TAB | Refills: 0 | Status: ON HOLD | OUTPATIENT
Start: 2018-11-14 | End: 2018-11-16 | Stop reason: ALTCHOICE

## 2018-11-14 RX ORDER — ALBUTEROL SULFATE 0.83 MG/ML
2.5 SOLUTION RESPIRATORY (INHALATION)
Status: COMPLETED | OUTPATIENT
Start: 2018-11-14 | End: 2018-11-14

## 2018-11-14 RX ADMIN — METHYLPREDNISOLONE SODIUM SUCCINATE 125 MG: 125 INJECTION, POWDER, FOR SOLUTION INTRAMUSCULAR; INTRAVENOUS at 18:52

## 2018-11-14 RX ADMIN — ALBUTEROL SULFATE 2.5 MG: 2.5 SOLUTION RESPIRATORY (INHALATION) at 20:37

## 2018-11-14 RX ADMIN — IPRATROPIUM BROMIDE AND ALBUTEROL SULFATE 3 ML: 2.5; .5 SOLUTION RESPIRATORY (INHALATION) at 18:52

## 2018-11-14 NOTE — ED PROVIDER NOTES
EMERGENCY DEPARTMENT HISTORY AND PHYSICAL EXAM    6:22 PM      Date: 11/14/2018  Patient Name: Bar Hector    History of Presenting Illness     Chief Complaint   Patient presents with    Wheezing    Shortness of Breath         History Provided By: Patient    Chief Complaint: SOB  Duration: 2 Weeks  Timing:  Constant  Location: N/A  Quality: N/A  Severity: Moderate  Modifying Factors: No relief with home asthma or COPD medication  Associated Symptoms: Wheezing      Additional History (Context): Bar Hector is a 60 y.o. female with diabetes, hypertension, hyperlipidemia, COPD and asthma who presents with constant moderate SOB that began 2 weeks ago. Pt reports she went to her doctor 12 days ago and again 5 days ago for her shortness of breath. She confirms that she has been admitted month ago for her shortness of breath. Her SOB was not relieved by her home COPD or asthma medications. Pt confirms wheezing. No other symptoms or concerns were expressed. PCP: Maddy Lopez MD    Current Outpatient Medications   Medication Sig Dispense Refill    azithromycin (ZITHROMAX) 250 mg tablet Take 1 Tab by mouth daily for 84 days. 84 Tab 0    albuterol sulfate 90 mcg/actuation aepb Take 1 Puff by inhalation every four (4) hours. 1 Inhaler 0    insulin glargine (LANTUS,BASAGLAR) 100 unit/mL (3 mL) inpn 30 Units by SubCUTAneous route nightly. Indications: type 2 diabetes mellitus 28 Units 0    NOVOLOG FLEXPEN U-100 INSULIN 100 unit/mL inpn Continue home Sliding scale insulin as prior to admission 1 Pen 0    fluticasone-salmeterol (ADVAIR DISKUS) 100-50 mcg/dose diskus inhaler Take 1 Puff by inhalation every twelve (12) hours. 1 Inhaler 0    umeclidinium (INCRUSE ELLIPTA) 62.5 mcg/actuation inhaler Take 1 Puff by inhalation daily. Indications: BRONCHOSPASM PREVENTION WITH COPD 1 Inhaler 0    ipratropium (ATROVENT) 0.02 % soln 2.5 mL by Nebulization route four (4) times daily as needed. Indications: BRONCHOSPASM PREVENTION WITH COPD 30 Vial 0    albuterol (PROVENTIL VENTOLIN) 2.5 mg /3 mL (0.083 %) nebulizer solution 3 mL by Nebulization route every four (4) hours as needed for Wheezing. Indications: BRONCHOSPASM PREVENTION, Chronic Obstructive Pulmonary Disease 30 Each 0    omeprazole (PRILOSEC) 40 mg capsule Take 40 mg by mouth daily. Indications: gastroesophageal reflux disease      lisinopril (PRINIVIL, ZESTRIL) 40 mg tablet Take 20 mg by mouth two (2) times a day. Indications: hypertension      simethicone (MYLICON) 80 mg chewable tablet Take 80 mg by mouth every six (6) hours as needed for Flatulence.  cpap machine kit by Does Not Apply route nightly.  OXYGEN-AIR DELIVERY SYSTEMS 2 L by Nasal route continuous. [facility]      aspirin delayed-release 81 mg tablet Take 81 mg by mouth daily.  famotidine (PEPCID) 20 mg tablet Take 20 mg by mouth two (2) times daily as needed.  albuterol (PROVENTIL HFA, VENTOLIN HFA, PROAIR HFA) 90 mcg/actuation inhaler Take 2 Puffs by inhalation every four (4) hours as needed for Wheezing. For the next 2 days after hospital discharge, use your inhaler 4 times a day. 1 Inhaler 0    fluticasone (FLONASE) 50 mcg/actuation nasal spray 2 Sprays by Both Nostrils route daily.  montelukast (SINGULAIR) 10 mg tablet Take 10 mg by mouth nightly.          Past History     Past Medical History:  Past Medical History:   Diagnosis Date    Asthma     Chronic lung disease     COPD     Cystocele, midline     Diabetes mellitus ([facility] [address]     GERD (gastroesophageal reflux disease)     Hidradenitis suppurativa     Hyperlipidemia     Hypertension     SHERRIE on CPAP     CPAP    Stress incontinence        Past Surgical History:  Past Surgical History:   Procedure Laterality Date    BREAST SURGERY PROCEDURE UNLISTED      Right breast benign tumor removal    HX APPENDECTOMY      HX CHOLECYSTECTOMY      HX DILATION AND CURETTAGE      Dysfunctional uterine bleeding, thought 2/2 fibroids    HX TUBAL LIGATION         Family History:  Family History   Problem Relation Age of Onset    Hypertension Mother     Stroke Mother     Breast Cancer Mother         Bilateral mastectomies    Cancer Mother         ovarian and breast    Heart Failure Mother     Heart Attack Father         2011    Heart Surgery Father         CABG    Heart Failure Father     COPD Sister         Heavy smoker    Cancer Sister         ovarian    Heart Failure Sister     Lung Disease Sister     Asthma Child     Cancer Maternal Aunt         pancreatic    Cancer Maternal Grandfather         stomach       Social History:  Social History     Tobacco Use    Smoking status: Former Smoker     Packs/day: 1.00     Years: 30.00     Pack years: 30.00     Types: Cigarettes     Start date: 1966     Last attempt to quit: 2006     Years since quittin.1    Smokeless tobacco: Never Used    Tobacco comment: 1-1.5 packs per day   Substance Use Topics    Alcohol use: No    Drug use: No       Allergies: Allergies   Allergen Reactions    Ancef [Cefazolin] Hives    Contrast Agent [Iodine] Anaphylaxis, Shortness of Breath and Swelling     Needs pre-medication for IV contrast with Benadryl, Solu-Medrol    Fish Containing Products Anaphylaxis     Pt states she had a severe allergic reaction at 9 y/o.  Metformin Other (comments)     Abdominal pain, diarrhea.  Codeine Other (comments)     Altered mental status         Review of Systems       Review of Systems   Constitutional: Negative for fever. Respiratory: Positive for shortness of breath and wheezing. Negative for cough. All other systems reviewed and are negative. Physical Exam     Visit Vitals  /80   Pulse 82   Temp 97.7 °F (36.5 °C)   Resp 22   SpO2 95%         Physical Exam  Physical Exam:  .   Patient Vitals for the past 12 hrs:   Temp Pulse Resp BP SpO2   18 1845 -- 82 22 163/80 95 %   18 1830 -- 84 20 147/74 96 %   11/14/18 1818 97.7 °F (36.5 °C) 84 24 (!) 159/94 96 %   11/14/18 1815 -- -- -- (!) 159/94 --     Gen: Obese 60 y.o. female  HEENT: Normocephalic, atraumatic  Respiratory: Mild agitated wheeze. No subcutaneous air, no rib crepitus  Cardiovascular: Regular rhythm and rate, Normal pulses, Normal perfusion. No edema  Gastrointestinal: Non distended, Non tender, No masses. No ascites. No organomegaly. No evidence of trauma  Musculoskeletal: Full range of motion at all other tested joints. No joint effusions. Neuro: Normal strength, Normal sensation. Normal speech. No ataxia. Cranial Nerves II-XII normal as tested. Skin: No rash, petechia or purpura. Warm and dry  Psyche: No suicidal ideation, No homicidal ideation. No hallucinations. Organized thoughts. Heme: Normal  : Deferred      Diagnostic Study Results     Labs -  Recent Results (from the past 12 hour(s))   METABOLIC PANEL, COMPREHENSIVE    Collection Time: 11/14/18  6:06 PM   Result Value Ref Range    Sodium 140 136 - 145 mmol/L    Potassium 4.0 3.5 - 5.5 mmol/L    Chloride 102 100 - 108 mmol/L    CO2 27 21 - 32 mmol/L    Anion gap 11 3.0 - 18 mmol/L    Glucose 168 (H) 74 - 99 mg/dL    BUN 11 7.0 - 18 MG/DL    Creatinine 0.81 0.6 - 1.3 MG/DL    BUN/Creatinine ratio 14 12 - 20      GFR est AA >60 >60 ml/min/1.73m2    GFR est non-AA >60 >60 ml/min/1.73m2    Calcium 9.4 8.5 - 10.1 MG/DL    Bilirubin, total 0.6 0.2 - 1.0 MG/DL    ALT (SGPT) 69 (H) 13 - 56 U/L    AST (SGOT) 37 15 - 37 U/L    Alk.  phosphatase 104 45 - 117 U/L    Protein, total 7.5 6.4 - 8.2 g/dL    Albumin 3.9 3.4 - 5.0 g/dL    Globulin 3.6 2.0 - 4.0 g/dL    A-G Ratio 1.1 0.8 - 1.7     CBC WITH AUTOMATED DIFF    Collection Time: 11/14/18  6:06 PM   Result Value Ref Range    WBC 5.8 4.6 - 13.2 K/uL    RBC 4.58 4.20 - 5.30 M/uL    HGB 14.0 12.0 - 16.0 g/dL    HCT 39.9 35.0 - 45.0 %    MCV 87.1 74.0 - 97.0 FL    MCH 30.6 24.0 - 34.0 PG    MCHC 35.1 31.0 - 37.0 g/dL    RDW 13.7 11.6 - 14.5 %    PLATELET 525 409 - 424 K/uL    MPV 9.1 (L) 9.2 - 11.8 FL    NEUTROPHILS 57 40 - 73 %    LYMPHOCYTES 34 21 - 52 %    MONOCYTES 7 3 - 10 %    EOSINOPHILS 1 0 - 5 %    BASOPHILS 1 0 - 2 %    ABS. NEUTROPHILS 3.3 1.8 - 8.0 K/UL    ABS. LYMPHOCYTES 2.0 0.9 - 3.6 K/UL    ABS. MONOCYTES 0.4 0.05 - 1.2 K/UL    ABS. EOSINOPHILS 0.1 0.0 - 0.4 K/UL    ABS. BASOPHILS 0.0 0.0 - 0.1 K/UL    DF AUTOMATED     NT-PRO BNP    Collection Time: 11/14/18  6:06 PM   Result Value Ref Range    NT pro-BNP 34 0 - 900 PG/ML   TROPONIN I    Collection Time: 11/14/18  6:06 PM   Result Value Ref Range    Troponin-I, Qt. <0.02 0.0 - 0.045 NG/ML   EKG, 12 LEAD, INITIAL    Collection Time: 11/14/18  6:16 PM   Result Value Ref Range    Ventricular Rate 84 BPM    Atrial Rate 84 BPM    P-R Interval 148 ms    QRS Duration 88 ms    Q-T Interval 378 ms    QTC Calculation (Bezet) 446 ms    Calculated P Axis 23 degrees    Calculated R Axis 33 degrees    Calculated T Axis 52 degrees    Diagnosis       Normal sinus rhythm  Cannot rule out Anterior infarct (cited on or before 14-NOV-2018)  Abnormal ECG  When compared with ECG of 07-OCT-2018 16:47,  No significant change was found         Radiologic Studies -   XR CHEST SNGL V    (Results Pending)     XR CHEST PRELIMINARY READ: 7:36 PM  No PNA no PTX. Small left sided pleural effusion. Medical Decision Making   I am the first provider for this patient. I reviewed the vital signs, available nursing notes, past medical history, past surgical history, family history and social history. Vital Signs-Reviewed the patient's vital signs. Pulse Oximetry Analysis -  96% on room air (Interpretation)Normal    Cardiac Monitor:  Rate: 84bpm  Rhythm:  Normal Sinus Rhythm     EKG: Interpreted by the EP.    Time Interpreted: 8:16PM   Rate: 84bpm   Rhythm: Normal Sinus Rhythm    Interpretation: QTc @ 446ms, No STEM    Records Reviewed: Nursing Notes (Time of Review: 6:22 PM)    ED Course: Progress Notes, Reevaluation, and Consults:      Diagnosis     Clinical Impression:   Diagnosis: No diagnosis found. Patient Active Problem List   Diagnosis Code    Essential hypertension, benign I10    [facility] E11.9    Esophageal reflux K21.9    SHERRIE on CPAP G47.33, Z99.89    COPD (chronic obstructive pulmonary disease) ([facility]) J44.9    Allergic rhinitis J30.9    COPD with acute exacerbation ([facility]) J44.1    Obesity, Class III, BMI 40-49.9 (morbid obesity) ([facility]) E66.01    Chest pain R07.9    Dyspnea R06.00    COPD exacerbation ([facility]) J44.1    Acute exacerbation of COPD with asthma ([facility]) J44.1, R92.237    Diastolic CHF, chronic ([facility]) I50.32    Diverticulosis K57.90    COPD with acute bronchitis ([facility]) J44.0, J20.9    Hyperlipidemia E78.5    Type 2 diabetes with nephropathy ([facility]) E11.21    Bilateral carotid bruits R09.89    Palpitations R00.2    Acute exacerbation of chronic obstructive pulmonary disease (COPD) ([facility]) J44.1       Disposition:    No discharge date for patient encounter. Discharge Medications:   Current Discharge Medication List            Follow-up Information    None             Medication List      ASK your doctor about these medications    * albuterol 90 mcg/actuation inhaler  Commonly known as:  PROVENTIL HFA, VENTOLIN HFA, PROAIR HFA  Take 2 Puffs by inhalation every four (4) hours as needed for Wheezing. For the next 2 days after hospital discharge, use your inhaler 4 times a day. * albuterol 2.5 mg /3 mL (0.083 %) nebulizer solution  Commonly known as:  PROVENTIL VENTOLIN  3 mL by Nebulization route every four (4) hours as needed for Wheezing. Indications: BRONCHOSPASM PREVENTION, Chronic Obstructive Pulmonary Disease     * albuterol sulfate 90 mcg/actuation Aepb  Take 1 Puff by inhalation every four (4) hours. aspirin delayed-release 81 mg tablet     azithromycin 250 mg tablet  Commonly known as:  ZITHROMAX  Take 1 Tab by mouth daily for 84 days.      cpap machine kit     famotidine 20 mg tablet  Commonly known as:  PEPCID     FLONASE 50 mcg/actuation nasal spray  Generic drug:  fluticasone     fluticasone-salmeterol 100-50 mcg/dose diskus inhaler  Commonly known as:  ADVAIR DISKUS  Take 1 Puff by inhalation every twelve (12) hours. insulin glargine 100 unit/mL (3 mL) Inpn  Commonly known as:  LANTUS,BASAGLAR  30 Units by SubCUTAneous route nightly. Indications: type 2 diabetes mellitus     ipratropium 0.02 % Soln  Commonly known as:  ATROVENT  2.5 mL by Nebulization route four (4) times daily as needed. Indications: BRONCHOSPASM PREVENTION WITH COPD     lisinopril 40 mg tablet  Commonly known as:  PRINIVIL, ZESTRIL     NovoLOG Flexpen U-100 Insulin 100 unit/mL Inpn  Generic drug:  insulin aspart U-100  Continue home Sliding scale insulin as prior to admission     omeprazole 40 mg capsule  Commonly known as:  PRILOSEC     OXYGEN-AIR DELIVERY SYSTEMS     simethicone 80 mg chewable tablet  Commonly known as:  MYLICON     SINGULAIR 10 mg tablet  Generic drug:  montelukast     umeclidinium 62.5 mcg/actuation inhaler  Commonly known as:  INCRUSE ELLIPTA  Take 1 Puff by inhalation daily. Indications: BRONCHOSPASM PREVENTION WITH COPD         * This list has 3 medication(s) that are the same as other medications prescribed for you. Read the directions carefully, and ask your doctor or other care provider to review them with you.              _______________________________       Scribe Attestation     Berenice Mcclure acting as a scribe for and in the presence of Elmer Clemente MD      November 14, 2018 at 6:22 PM       Provider Attestation:      I personally performed the services described in the documentation, reviewed the documentation, as recorded by the scribe in my presence, and it accurately and completely records my words and actions.  November 14, 2018 at 6:22 PM - Elmer Clemente MD        _______________________________

## 2018-11-15 LAB
ATRIAL RATE: 84 BPM
CALCULATED P AXIS, ECG09: 23 DEGREES
CALCULATED R AXIS, ECG10: 33 DEGREES
CALCULATED T AXIS, ECG11: 52 DEGREES
DIAGNOSIS, 93000: NORMAL
P-R INTERVAL, ECG05: 148 MS
Q-T INTERVAL, ECG07: 378 MS
QRS DURATION, ECG06: 88 MS
QTC CALCULATION (BEZET), ECG08: 446 MS
VENTRICULAR RATE, ECG03: 84 BPM

## 2018-11-15 NOTE — ED NOTES
Bedside shift change report given to Ani Ortiz RN (oncoming nurse) by Jono Cesar RN (offgoing nurse). Report included the following information SBAR, ED Summary, Intake/Output, MAR and Recent Results.

## 2018-11-15 NOTE — DISCHARGE INSTRUCTIONS
Chronic Obstructive Pulmonary Disease (COPD): Care Instructions  Your Care Instructions    Chronic obstructive pulmonary disease (COPD) is a general term for a group of lung diseases, including emphysema and chronic bronchitis. People with COPD have decreased airflow in and out of the lungs, which makes it hard to breathe. The airways also can get clogged with thick mucus. Cigarette smoking is a major cause of COPD. Although there is no cure for COPD, you can slow its progress. Following your treatment plan and taking care of yourself can help you feel better and live longer. Follow-up care is a key part of your treatment and safety. Be sure to make and go to all appointments, and call your doctor if you are having problems. It's also a good idea to know your test results and keep a list of the medicines you take. How can you care for yourself at home?   Staying healthy    · Do not smoke. This is the most important step you can take to prevent more damage to your lungs. If you need help quitting, talk to your doctor about stop-smoking programs and medicines. These can increase your chances of quitting for good.     · Avoid colds and flu. Get a pneumococcal vaccine shot. If you have had one before, ask your doctor whether you need a second dose. Get the flu vaccine every fall. If you must be around people with colds or the flu, wash your hands often.     · Avoid secondhand smoke, air pollution, and high altitudes. Also avoid cold, dry air and hot, humid air. Stay at home with your windows closed when air pollution is bad.    Medicines and oxygen therapy    · Take your medicines exactly as prescribed. Call your doctor if you think you are having a problem with your medicine.     · You may be taking medicines such as:  ? Bronchodilators. These help open your airways and make breathing easier. Bronchodilators are either short-acting (work for 6 to 9 hours) or long-acting (work for 24 hours).  You inhale most bronchodilators, so they start to act quickly. Always carry your quick-relief inhaler with you in case you need it while you are away from home. ? Corticosteroids (prednisone, budesonide). These reduce airway inflammation. They come in pill or inhaled form. You must take these medicines every day for them to work well.     · A spacer may help you get more inhaled medicine to your lungs. Ask your doctor or pharmacist if a spacer is right for you. If it is, ask how to use it properly.     · Do not take any vitamins, over-the-counter medicine, or herbal products without talking to your doctor first.     · If your doctor prescribed antibiotics, take them as directed. Do not stop taking them just because you feel better. You need to take the full course of antibiotics.     · Oxygen therapy boosts the amount of oxygen in your blood and helps you breathe easier. Use the flow rate your doctor has recommended, and do not change it without talking to your doctor first.   Activity    · Get regular exercise. Walking is an easy way to get exercise. Start out slowly, and walk a little more each day.     · Pay attention to your breathing. You are exercising too hard if you cannot talk while you are exercising.     · Take short rest breaks when doing household chores and other activities.     · Learn breathing methods--such as breathing through pursed lips--to help you become less short of breath.     · If your doctor has not set you up with a pulmonary rehabilitation program, talk to him or her about whether rehab is right for you. Rehab includes exercise programs, education about your disease and how to manage it, help with diet and other changes, and emotional support. Diet    · Eat regular, healthy meals. Use bronchodilators about 1 hour before you eat to make it easier to eat. Eat several small meals instead of three large ones.  Drink beverages at the end of the meal. Avoid foods that are hard to chew.     · Eat foods that contain protein so that you do not lose muscle mass.     · Talk with your doctor if you gain too much weight or if you lose weight without trying.    Mental health    · Talk to your family, friends, or a therapist about your feelings. It is normal to feel frightened, angry, hopeless, helpless, and even guilty. Talking openly about bad feelings can help you cope. If these feelings last, talk to your doctor. When should you call for help? Call 911 anytime you think you may need emergency care. For example, call if:    · You have severe trouble breathing.    Call your doctor now or seek immediate medical care if:    · You have new or worse trouble breathing.     · You cough up blood.     · You have a fever.    Watch closely for changes in your health, and be sure to contact your doctor if:    · You cough more deeply or more often, especially if you notice more mucus or a change in the color of your mucus.     · You have new or worse swelling in your legs or belly.     · You are not getting better as expected. Where can you learn more? Go to http://etelvina-olivier.info/. Jak Garcia in the search box to learn more about \"Chronic Obstructive Pulmonary Disease (COPD): Care Instructions. \"  Current as of: December 6, 2017  Content Version: 11.8  © 7924-0724 [employer]. Care instructions adapted under license by [employer] (which disclaims liability or warranty for this information). If you have questions about a medical condition or this instruction, always ask your healthcare professional. [place] [address] any warranty or liability for your use of this information.

## 2018-11-15 NOTE — ED NOTES
Received nursing report from JOSÉ MIGUEL Patrick. Patient sitting comfortably on stretcher, denies any new complaints at this time. Patient states she feels slightly better, will closely monitor patients. Awaiting further orders from provider.

## 2018-11-16 ENCOUNTER — HOSPITAL ENCOUNTER (INPATIENT)
Age: 58
LOS: 4 days | Discharge: HOME HEALTH | DRG: 191 | End: 2018-11-20
Attending: EMERGENCY MEDICINE | Admitting: FAMILY MEDICINE
Payer: MEDICARE

## 2018-11-16 ENCOUNTER — APPOINTMENT (OUTPATIENT)
Dept: GENERAL RADIOLOGY | Age: 58
DRG: 191 | End: 2018-11-16
Attending: EMERGENCY MEDICINE
Payer: MEDICARE

## 2018-11-16 DIAGNOSIS — J44.1 ACUTE EXACERBATION OF CHRONIC OBSTRUCTIVE PULMONARY DISEASE (COPD) (HCC): Primary | ICD-10-CM

## 2018-11-16 DIAGNOSIS — R09.02 HYPOXIA: ICD-10-CM

## 2018-11-16 LAB
ALBUMIN SERPL-MCNC: 3.6 G/DL (ref 3.4–5)
ALBUMIN/GLOB SERPL: 0.9 {RATIO} (ref 0.8–1.7)
ALP SERPL-CCNC: 92 U/L (ref 45–117)
ALT SERPL-CCNC: 83 U/L (ref 13–56)
ANION GAP SERPL CALC-SCNC: 8 MMOL/L (ref 3–18)
AST SERPL-CCNC: 68 U/L (ref 15–37)
BASOPHILS # BLD: 0 K/UL (ref 0–0.1)
BASOPHILS NFR BLD: 0 % (ref 0–2)
BILIRUB SERPL-MCNC: 0.4 MG/DL (ref 0.2–1)
BNP SERPL-MCNC: 81 PG/ML (ref 0–900)
BUN SERPL-MCNC: 15 MG/DL (ref 7–18)
BUN/CREAT SERPL: 15 (ref 12–20)
CALCIUM SERPL-MCNC: 8.9 MG/DL (ref 8.5–10.1)
CHLORIDE SERPL-SCNC: 103 MMOL/L (ref 100–108)
CK MB CFR SERPL CALC: 1.4 % (ref 0–4)
CK MB SERPL-MCNC: 1.2 NG/ML (ref 5–25)
CK SERPL-CCNC: 85 U/L (ref 26–192)
CO2 SERPL-SCNC: 27 MMOL/L (ref 21–32)
CREAT SERPL-MCNC: 1.02 MG/DL (ref 0.6–1.3)
DIFFERENTIAL METHOD BLD: ABNORMAL
EOSINOPHIL # BLD: 0.1 K/UL (ref 0–0.4)
EOSINOPHIL NFR BLD: 1 % (ref 0–5)
ERYTHROCYTE [DISTWIDTH] IN BLOOD BY AUTOMATED COUNT: 13.8 % (ref 11.6–14.5)
GLOBULIN SER CALC-MCNC: 4 G/DL (ref 2–4)
GLUCOSE SERPL-MCNC: 241 MG/DL (ref 74–99)
HCT VFR BLD AUTO: 39.7 % (ref 35–45)
HGB BLD-MCNC: 13.7 G/DL (ref 12–16)
LYMPHOCYTES # BLD: 2.6 K/UL (ref 0.9–3.6)
LYMPHOCYTES NFR BLD: 32 % (ref 21–52)
MAGNESIUM SERPL-MCNC: 1.8 MG/DL (ref 1.6–2.6)
MCH RBC QN AUTO: 30.4 PG (ref 24–34)
MCHC RBC AUTO-ENTMCNC: 34.5 G/DL (ref 31–37)
MCV RBC AUTO: 88 FL (ref 74–97)
MONOCYTES # BLD: 0.5 K/UL (ref 0.05–1.2)
MONOCYTES NFR BLD: 6 % (ref 3–10)
NEUTS SEG # BLD: 4.8 K/UL (ref 1.8–8)
NEUTS SEG NFR BLD: 61 % (ref 40–73)
PLATELET # BLD AUTO: 295 K/UL (ref 135–420)
PMV BLD AUTO: 9.1 FL (ref 9.2–11.8)
POTASSIUM SERPL-SCNC: 3.5 MMOL/L (ref 3.5–5.5)
PROT SERPL-MCNC: 7.6 G/DL (ref 6.4–8.2)
RBC # BLD AUTO: 4.51 M/UL (ref 4.2–5.3)
SODIUM SERPL-SCNC: 138 MMOL/L (ref 136–145)
TROPONIN I SERPL-MCNC: <0.02 NG/ML (ref 0–0.04)
WBC # BLD AUTO: 8 K/UL (ref 4.6–13.2)

## 2018-11-16 PROCEDURE — 94761 N-INVAS EAR/PLS OXIMETRY MLT: CPT

## 2018-11-16 PROCEDURE — 96365 THER/PROPH/DIAG IV INF INIT: CPT

## 2018-11-16 PROCEDURE — 74011250637 HC RX REV CODE- 250/637: Performed by: STUDENT IN AN ORGANIZED HEALTH CARE EDUCATION/TRAINING PROGRAM

## 2018-11-16 PROCEDURE — 74011250636 HC RX REV CODE- 250/636: Performed by: STUDENT IN AN ORGANIZED HEALTH CARE EDUCATION/TRAINING PROGRAM

## 2018-11-16 PROCEDURE — 65660000000 HC RM CCU STEPDOWN

## 2018-11-16 PROCEDURE — 96375 TX/PRO/DX INJ NEW DRUG ADDON: CPT

## 2018-11-16 PROCEDURE — 80053 COMPREHEN METABOLIC PANEL: CPT

## 2018-11-16 PROCEDURE — 99285 EMERGENCY DEPT VISIT HI MDM: CPT

## 2018-11-16 PROCEDURE — 93005 ELECTROCARDIOGRAM TRACING: CPT

## 2018-11-16 PROCEDURE — 83735 ASSAY OF MAGNESIUM: CPT

## 2018-11-16 PROCEDURE — 74011250636 HC RX REV CODE- 250/636: Performed by: EMERGENCY MEDICINE

## 2018-11-16 PROCEDURE — 82553 CREATINE MB FRACTION: CPT

## 2018-11-16 PROCEDURE — 74011000250 HC RX REV CODE- 250: Performed by: EMERGENCY MEDICINE

## 2018-11-16 PROCEDURE — 83880 ASSAY OF NATRIURETIC PEPTIDE: CPT

## 2018-11-16 PROCEDURE — 85025 COMPLETE CBC W/AUTO DIFF WBC: CPT

## 2018-11-16 PROCEDURE — 94640 AIRWAY INHALATION TREATMENT: CPT

## 2018-11-16 PROCEDURE — 74011250637 HC RX REV CODE- 250/637: Performed by: EMERGENCY MEDICINE

## 2018-11-16 PROCEDURE — 71045 X-RAY EXAM CHEST 1 VIEW: CPT

## 2018-11-16 PROCEDURE — 77030029684 HC NEB SM VOL KT MONA -A

## 2018-11-16 RX ORDER — ALBUTEROL SULFATE 0.83 MG/ML
2.5 SOLUTION RESPIRATORY (INHALATION)
Status: COMPLETED | OUTPATIENT
Start: 2018-11-16 | End: 2018-11-16

## 2018-11-16 RX ORDER — FAMOTIDINE 20 MG/1
20 TABLET, FILM COATED ORAL
Status: DISCONTINUED | OUTPATIENT
Start: 2018-11-16 | End: 2018-11-17

## 2018-11-16 RX ORDER — PANTOPRAZOLE SODIUM 40 MG/1
40 TABLET, DELAYED RELEASE ORAL
Status: DISCONTINUED | OUTPATIENT
Start: 2018-11-17 | End: 2018-11-16 | Stop reason: SDUPTHER

## 2018-11-16 RX ORDER — DEXTROSE 50 % IN WATER (D50W) INTRAVENOUS SYRINGE
25-50 AS NEEDED
Status: DISCONTINUED | OUTPATIENT
Start: 2018-11-16 | End: 2018-11-20 | Stop reason: HOSPADM

## 2018-11-16 RX ORDER — AZITHROMYCIN 250 MG/1
250 TABLET, FILM COATED ORAL DAILY
Status: DISCONTINUED | OUTPATIENT
Start: 2018-11-17 | End: 2018-11-17

## 2018-11-16 RX ORDER — ACETAMINOPHEN 325 MG/1
650 TABLET ORAL
Status: DISCONTINUED | OUTPATIENT
Start: 2018-11-16 | End: 2018-11-20 | Stop reason: HOSPADM

## 2018-11-16 RX ORDER — LISINOPRIL 20 MG/1
20 TABLET ORAL 2 TIMES DAILY
Status: DISCONTINUED | OUTPATIENT
Start: 2018-11-16 | End: 2018-11-20 | Stop reason: HOSPADM

## 2018-11-16 RX ORDER — IPRATROPIUM BROMIDE AND ALBUTEROL SULFATE 2.5; .5 MG/3ML; MG/3ML
3 SOLUTION RESPIRATORY (INHALATION)
Status: COMPLETED | OUTPATIENT
Start: 2018-11-16 | End: 2018-11-16

## 2018-11-16 RX ORDER — IPRATROPIUM BROMIDE AND ALBUTEROL SULFATE 2.5; .5 MG/3ML; MG/3ML
3 SOLUTION RESPIRATORY (INHALATION)
Status: DISCONTINUED | OUTPATIENT
Start: 2018-11-16 | End: 2018-11-16 | Stop reason: SDUPTHER

## 2018-11-16 RX ORDER — INSULIN GLARGINE 100 [IU]/ML
30 INJECTION, SOLUTION SUBCUTANEOUS
Status: DISCONTINUED | OUTPATIENT
Start: 2018-11-16 | End: 2018-11-20 | Stop reason: HOSPADM

## 2018-11-16 RX ORDER — IPRATROPIUM BROMIDE AND ALBUTEROL SULFATE 2.5; .5 MG/3ML; MG/3ML
3 SOLUTION RESPIRATORY (INHALATION)
Status: DISCONTINUED | OUTPATIENT
Start: 2018-11-17 | End: 2018-11-17

## 2018-11-16 RX ORDER — FLUTICASONE PROPIONATE 50 MCG
2 SPRAY, SUSPENSION (ML) NASAL DAILY
Status: DISCONTINUED | OUTPATIENT
Start: 2018-11-17 | End: 2018-11-20 | Stop reason: HOSPADM

## 2018-11-16 RX ORDER — LEVOFLOXACIN 5 MG/ML
750 INJECTION, SOLUTION INTRAVENOUS EVERY 24 HOURS
Status: DISCONTINUED | OUTPATIENT
Start: 2018-11-16 | End: 2018-11-20

## 2018-11-16 RX ORDER — ENOXAPARIN SODIUM 100 MG/ML
40 INJECTION SUBCUTANEOUS EVERY 24 HOURS
Status: DISCONTINUED | OUTPATIENT
Start: 2018-11-16 | End: 2018-11-20 | Stop reason: HOSPADM

## 2018-11-16 RX ORDER — INSULIN LISPRO 100 [IU]/ML
INJECTION, SOLUTION INTRAVENOUS; SUBCUTANEOUS
Status: DISCONTINUED | OUTPATIENT
Start: 2018-11-17 | End: 2018-11-17

## 2018-11-16 RX ORDER — ACETAMINOPHEN 325 MG/1
650 TABLET ORAL
Status: COMPLETED | OUTPATIENT
Start: 2018-11-16 | End: 2018-11-16

## 2018-11-16 RX ORDER — SIMETHICONE 80 MG
80 TABLET,CHEWABLE ORAL
Status: DISCONTINUED | OUTPATIENT
Start: 2018-11-16 | End: 2018-11-20 | Stop reason: HOSPADM

## 2018-11-16 RX ORDER — MONTELUKAST SODIUM 10 MG/1
10 TABLET ORAL
Status: DISCONTINUED | OUTPATIENT
Start: 2018-11-16 | End: 2018-11-20 | Stop reason: HOSPADM

## 2018-11-16 RX ORDER — ASPIRIN 81 MG/1
81 TABLET ORAL DAILY
Status: DISCONTINUED | OUTPATIENT
Start: 2018-11-17 | End: 2018-11-20 | Stop reason: HOSPADM

## 2018-11-16 RX ORDER — MAGNESIUM SULFATE 100 %
4 CRYSTALS MISCELLANEOUS AS NEEDED
Status: DISCONTINUED | OUTPATIENT
Start: 2018-11-16 | End: 2018-11-20 | Stop reason: HOSPADM

## 2018-11-16 RX ORDER — MAGNESIUM SULFATE HEPTAHYDRATE 40 MG/ML
2 INJECTION, SOLUTION INTRAVENOUS ONCE
Status: COMPLETED | OUTPATIENT
Start: 2018-11-16 | End: 2018-11-16

## 2018-11-16 RX ADMIN — ACETAMINOPHEN 650 MG: 325 TABLET ORAL at 23:36

## 2018-11-16 RX ADMIN — METHYLPREDNISOLONE SODIUM SUCCINATE 125 MG: 125 INJECTION, POWDER, FOR SOLUTION INTRAMUSCULAR; INTRAVENOUS at 17:20

## 2018-11-16 RX ADMIN — METHYLPREDNISOLONE SODIUM SUCCINATE 60 MG: 40 INJECTION, POWDER, FOR SOLUTION INTRAMUSCULAR; INTRAVENOUS at 23:37

## 2018-11-16 RX ADMIN — IPRATROPIUM BROMIDE AND ALBUTEROL SULFATE 3 ML: .5; 3 SOLUTION RESPIRATORY (INHALATION) at 18:39

## 2018-11-16 RX ADMIN — IPRATROPIUM BROMIDE AND ALBUTEROL SULFATE 3 ML: .5; 3 SOLUTION RESPIRATORY (INHALATION) at 18:20

## 2018-11-16 RX ADMIN — ALBUTEROL SULFATE 2.5 MG: 2.5 SOLUTION RESPIRATORY (INHALATION) at 19:01

## 2018-11-16 RX ADMIN — ACETAMINOPHEN 650 MG: 325 TABLET ORAL at 18:39

## 2018-11-16 RX ADMIN — MONTELUKAST SODIUM 10 MG: 10 TABLET, COATED ORAL at 23:30

## 2018-11-16 RX ADMIN — LEVOFLOXACIN 750 MG: 5 INJECTION, SOLUTION INTRAVENOUS at 23:29

## 2018-11-16 RX ADMIN — ENOXAPARIN SODIUM 40 MG: 100 INJECTION SUBCUTANEOUS at 23:29

## 2018-11-16 RX ADMIN — MAGNESIUM SULFATE HEPTAHYDRATE 2 G: 40 INJECTION, SOLUTION INTRAVENOUS at 17:22
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
## 2018-11-17 LAB
ALBUMIN SERPL-MCNC: 3.3 G/DL (ref 3.4–5)
ALBUMIN/GLOB SERPL: 0.9 {RATIO} (ref 0.8–1.7)
ALP SERPL-CCNC: 87 U/L (ref 45–117)
ALT SERPL-CCNC: 74 U/L (ref 13–56)
ANION GAP SERPL CALC-SCNC: 8 MMOL/L (ref 3–18)
AST SERPL-CCNC: 36 U/L (ref 15–37)
ATRIAL RATE: 94 BPM
BASOPHILS # BLD: 0 K/UL (ref 0–0.1)
BASOPHILS NFR BLD: 0 % (ref 0–2)
BILIRUB SERPL-MCNC: 0.4 MG/DL (ref 0.2–1)
BUN SERPL-MCNC: 15 MG/DL (ref 7–18)
BUN/CREAT SERPL: 13 (ref 12–20)
CALCIUM SERPL-MCNC: 8.6 MG/DL (ref 8.5–10.1)
CALCULATED P AXIS, ECG09: 46 DEGREES
CALCULATED R AXIS, ECG10: 35 DEGREES
CALCULATED T AXIS, ECG11: 39 DEGREES
CHLORIDE SERPL-SCNC: 101 MMOL/L (ref 100–108)
CO2 SERPL-SCNC: 25 MMOL/L (ref 21–32)
CREAT SERPL-MCNC: 1.17 MG/DL (ref 0.6–1.3)
DIAGNOSIS, 93000: NORMAL
DIFFERENTIAL METHOD BLD: ABNORMAL
EOSINOPHIL # BLD: 0 K/UL (ref 0–0.4)
EOSINOPHIL NFR BLD: 0 % (ref 0–5)
ERYTHROCYTE [DISTWIDTH] IN BLOOD BY AUTOMATED COUNT: 13.7 % (ref 11.6–14.5)
GLOBULIN SER CALC-MCNC: 3.7 G/DL (ref 2–4)
GLUCOSE BLD STRIP.AUTO-MCNC: 280 MG/DL (ref 70–110)
GLUCOSE BLD STRIP.AUTO-MCNC: 342 MG/DL (ref 70–110)
GLUCOSE BLD STRIP.AUTO-MCNC: 344 MG/DL (ref 70–110)
GLUCOSE BLD STRIP.AUTO-MCNC: 364 MG/DL (ref 70–110)
GLUCOSE BLD STRIP.AUTO-MCNC: 382 MG/DL (ref 70–110)
GLUCOSE BLD STRIP.AUTO-MCNC: 391 MG/DL (ref 70–110)
GLUCOSE SERPL-MCNC: 439 MG/DL (ref 74–99)
HCT VFR BLD AUTO: 37.5 % (ref 35–45)
HGB BLD-MCNC: 12.7 G/DL (ref 12–16)
LYMPHOCYTES # BLD: 0.7 K/UL (ref 0.9–3.6)
LYMPHOCYTES NFR BLD: 8 % (ref 21–52)
MCH RBC QN AUTO: 30 PG (ref 24–34)
MCHC RBC AUTO-ENTMCNC: 33.9 G/DL (ref 31–37)
MCV RBC AUTO: 88.7 FL (ref 74–97)
MONOCYTES # BLD: 0.1 K/UL (ref 0.05–1.2)
MONOCYTES NFR BLD: 1 % (ref 3–10)
NEUTS SEG # BLD: 8.2 K/UL (ref 1.8–8)
NEUTS SEG NFR BLD: 91 % (ref 40–73)
P-R INTERVAL, ECG05: 146 MS
PLATELET # BLD AUTO: 295 K/UL (ref 135–420)
PMV BLD AUTO: 9.3 FL (ref 9.2–11.8)
POTASSIUM SERPL-SCNC: 4.2 MMOL/L (ref 3.5–5.5)
PROT SERPL-MCNC: 7 G/DL (ref 6.4–8.2)
Q-T INTERVAL, ECG07: 374 MS
QRS DURATION, ECG06: 84 MS
QTC CALCULATION (BEZET), ECG08: 467 MS
RBC # BLD AUTO: 4.23 M/UL (ref 4.2–5.3)
SODIUM SERPL-SCNC: 134 MMOL/L (ref 136–145)
VENTRICULAR RATE, ECG03: 94 BPM
WBC # BLD AUTO: 8.9 K/UL (ref 4.6–13.2)

## 2018-11-17 PROCEDURE — 94640 AIRWAY INHALATION TREATMENT: CPT

## 2018-11-17 PROCEDURE — 82962 GLUCOSE BLOOD TEST: CPT

## 2018-11-17 PROCEDURE — 74011000250 HC RX REV CODE- 250: Performed by: STUDENT IN AN ORGANIZED HEALTH CARE EDUCATION/TRAINING PROGRAM

## 2018-11-17 PROCEDURE — 36415 COLL VENOUS BLD VENIPUNCTURE: CPT

## 2018-11-17 PROCEDURE — 74011636637 HC RX REV CODE- 636/637: Performed by: STUDENT IN AN ORGANIZED HEALTH CARE EDUCATION/TRAINING PROGRAM

## 2018-11-17 PROCEDURE — 74011636637 HC RX REV CODE- 636/637: Performed by: FAMILY MEDICINE

## 2018-11-17 PROCEDURE — 74011250637 HC RX REV CODE- 250/637: Performed by: STUDENT IN AN ORGANIZED HEALTH CARE EDUCATION/TRAINING PROGRAM

## 2018-11-17 PROCEDURE — 74011250636 HC RX REV CODE- 250/636: Performed by: STUDENT IN AN ORGANIZED HEALTH CARE EDUCATION/TRAINING PROGRAM

## 2018-11-17 PROCEDURE — 80053 COMPREHEN METABOLIC PANEL: CPT

## 2018-11-17 PROCEDURE — 77010033678 HC OXYGEN DAILY

## 2018-11-17 PROCEDURE — 94660 CPAP INITIATION&MGMT: CPT

## 2018-11-17 PROCEDURE — 85025 COMPLETE CBC W/AUTO DIFF WBC: CPT

## 2018-11-17 PROCEDURE — 74011250637 HC RX REV CODE- 250/637: Performed by: INTERNAL MEDICINE

## 2018-11-17 PROCEDURE — 65660000000 HC RM CCU STEPDOWN

## 2018-11-17 RX ORDER — INSULIN LISPRO 100 [IU]/ML
INJECTION, SOLUTION INTRAVENOUS; SUBCUTANEOUS
Status: DISCONTINUED | OUTPATIENT
Start: 2018-11-17 | End: 2018-11-19

## 2018-11-17 RX ORDER — FAMOTIDINE 20 MG/1
20 TABLET, FILM COATED ORAL 2 TIMES DAILY
Status: DISCONTINUED | OUTPATIENT
Start: 2018-11-17 | End: 2018-11-20 | Stop reason: HOSPADM

## 2018-11-17 RX ORDER — INSULIN LISPRO 100 [IU]/ML
8 INJECTION, SOLUTION INTRAVENOUS; SUBCUTANEOUS ONCE
Status: COMPLETED | OUTPATIENT
Start: 2018-11-17 | End: 2018-11-17

## 2018-11-17 RX ORDER — LORATADINE 10 MG/1
10 TABLET ORAL DAILY
Status: DISCONTINUED | OUTPATIENT
Start: 2018-11-18 | End: 2018-11-20 | Stop reason: HOSPADM

## 2018-11-17 RX ORDER — IPRATROPIUM BROMIDE AND ALBUTEROL SULFATE 2.5; .5 MG/3ML; MG/3ML
3 SOLUTION RESPIRATORY (INHALATION)
Status: DISCONTINUED | OUTPATIENT
Start: 2018-11-17 | End: 2018-11-20

## 2018-11-17 RX ADMIN — IPRATROPIUM BROMIDE AND ALBUTEROL SULFATE 3 ML: .5; 3 SOLUTION RESPIRATORY (INHALATION) at 11:49

## 2018-11-17 RX ADMIN — IPRATROPIUM BROMIDE AND ALBUTEROL SULFATE 3 ML: .5; 3 SOLUTION RESPIRATORY (INHALATION) at 19:07

## 2018-11-17 RX ADMIN — LISINOPRIL 20 MG: 20 TABLET ORAL at 16:41

## 2018-11-17 RX ADMIN — ASPIRIN 81 MG: 81 TABLET, COATED ORAL at 08:13

## 2018-11-17 RX ADMIN — IPRATROPIUM BROMIDE AND ALBUTEROL SULFATE 3 ML: .5; 3 SOLUTION RESPIRATORY (INHALATION) at 00:12

## 2018-11-17 RX ADMIN — FLUTICASONE PROPIONATE 2 SPRAY: 50 SPRAY, METERED NASAL at 08:14

## 2018-11-17 RX ADMIN — INSULIN GLARGINE 30 UNITS: 100 INJECTION, SOLUTION SUBCUTANEOUS at 00:04

## 2018-11-17 RX ADMIN — METHYLPREDNISOLONE SODIUM SUCCINATE 60 MG: 40 INJECTION, POWDER, FOR SOLUTION INTRAMUSCULAR; INTRAVENOUS at 16:43

## 2018-11-17 RX ADMIN — INSULIN LISPRO 8 UNITS: 100 INJECTION, SOLUTION INTRAVENOUS; SUBCUTANEOUS at 03:53

## 2018-11-17 RX ADMIN — ENOXAPARIN SODIUM 40 MG: 100 INJECTION SUBCUTANEOUS at 21:44

## 2018-11-17 RX ADMIN — IPRATROPIUM BROMIDE AND ALBUTEROL SULFATE 3 ML: .5; 3 SOLUTION RESPIRATORY (INHALATION) at 05:06

## 2018-11-17 RX ADMIN — IPRATROPIUM BROMIDE AND ALBUTEROL SULFATE 3 ML: .5; 3 SOLUTION RESPIRATORY (INHALATION) at 16:48

## 2018-11-17 RX ADMIN — INSULIN LISPRO 10 UNITS: 100 INJECTION, SOLUTION INTRAVENOUS; SUBCUTANEOUS at 16:01

## 2018-11-17 RX ADMIN — INSULIN LISPRO 10 UNITS: 100 INJECTION, SOLUTION INTRAVENOUS; SUBCUTANEOUS at 21:47

## 2018-11-17 RX ADMIN — INSULIN LISPRO 8 UNITS: 100 INJECTION, SOLUTION INTRAVENOUS; SUBCUTANEOUS at 00:21

## 2018-11-17 RX ADMIN — FAMOTIDINE 20 MG: 20 TABLET ORAL at 17:14

## 2018-11-17 RX ADMIN — METHYLPREDNISOLONE SODIUM SUCCINATE 60 MG: 40 INJECTION, POWDER, FOR SOLUTION INTRAMUSCULAR; INTRAVENOUS at 05:06

## 2018-11-17 RX ADMIN — INSULIN LISPRO 10 UNITS: 100 INJECTION, SOLUTION INTRAVENOUS; SUBCUTANEOUS at 11:12

## 2018-11-17 RX ADMIN — INSULIN GLARGINE 30 UNITS: 100 INJECTION, SOLUTION SUBCUTANEOUS at 21:48

## 2018-11-17 RX ADMIN — IPRATROPIUM BROMIDE AND ALBUTEROL SULFATE 3 ML: .5; 3 SOLUTION RESPIRATORY (INHALATION) at 08:26

## 2018-11-17 RX ADMIN — LISINOPRIL 20 MG: 20 TABLET ORAL at 08:13

## 2018-11-17 RX ADMIN — MONTELUKAST SODIUM 10 MG: 10 TABLET, COATED ORAL at 21:44

## 2018-11-17 RX ADMIN — AZITHROMYCIN 250 MG: 250 TABLET, FILM COATED ORAL at 08:13

## 2018-11-17 RX ADMIN — METHYLPREDNISOLONE SODIUM SUCCINATE 60 MG: 40 INJECTION, POWDER, FOR SOLUTION INTRAMUSCULAR; INTRAVENOUS at 11:46

## 2018-11-17 RX ADMIN — FAMOTIDINE 20 MG: 20 TABLET ORAL at 08:19

## 2018-11-17 RX ADMIN — INSULIN LISPRO 4 UNITS: 100 INJECTION, SOLUTION INTRAVENOUS; SUBCUTANEOUS at 08:13

## 2018-11-17 RX ADMIN — IPRATROPIUM BROMIDE AND ALBUTEROL SULFATE 3 ML: .5; 3 SOLUTION RESPIRATORY (INHALATION) at 23:03

## 2018-11-17 NOTE — PROGRESS NOTES
Intern Progress Note  [facility]       Patient: Sofi Plaza MRN: [id number]  CSN: [id number]    YOB: 1960  Age: 60 y.o. Sex: female    DOA: 11/16/2018 LOS:  LOS: 1 day                    Subjective:   Patient resting comfortably on CPAP. Patient reports improved respiratory status and continued chest tightness. She has to take a deeper breath with each sentence instead of every few words, which is improved from yesterday. Patient had BM. She has continued to do the Duoneb treatments overnight. ROS  Review of Systems - General ROS: denies fever or chills  Respiratory ROS: positive for - cough, shortness of breath and wheezing  Cardiovascular ROS: positive for - chest tightness, negative for - chest pain, loss of consciousness or palpitations  Gastrointestinal ROS: no abdominal pain, change in bowel habits, or black or bloody stools    Objective:      Patient Vitals for the past 24 hrs:   Temp Pulse Resp BP SpO2   11/17/18 0459 97 °F (36.1 °C) 84 20 125/74 94 %   11/17/18 0242 -- -- -- -- 99 %   11/17/18 0030 98.1 °F (36.7 °C) 90 22 132/72 95 %   11/16/18 2040 97.6 °F (36.4 °C) 84 24 144/88 96 %   11/16/18 1845 97.8 °F (36.6 °C) 86 25 152/73 99 %   11/16/18 1830 -- 83 26 151/66 99 %   11/16/18 1815 -- 89 27 149/72 98 %   11/16/18 1800 -- 89 21 143/73 96 %   11/16/18 1745 -- 91 22 158/79 95 %   11/16/18 1715 -- 94 24 160/73 94 %   11/16/18 1700 -- -- -- -- 95 %   11/16/18 1658 97.8 °F (36.6 °C) 97 24 178/89 95 %       No intake or output data in the 24 hours ending 11/17/18 0707    Physical Exam:  General:  Alert and Responsive and in No acute distress. HEENT: Conjunctiva pink, sclera anicteric. EOMI. Pharynx moist, nonerythematous. Moist mucous membranes. No other gross abnormalities present. CV:  RRR, no murmurs. No visible pulsations or thrills. RESP: Patient able to speak full sentences, improved from initial presentaion.  End expiratory wheezing bilaterally. Equal expansion bilaterally. ABD:  Soft, nontender, nondistended. MS:  No joint deformity or instability. No atrophy. Neuro:  5/5 strength bilateral upper extremities and lower extremities. A+Ox3. Ext:  No edema. 2+ radial and dp pulses bilaterally. Skin:  No rashes, lesions, or ulcers. Good turgor. Lab/Data Reviewed:  Recent Results (from the past 12 hour(s))   GLUCOSE, POC    Collection Time: 11/17/18 12:00 AM   Result Value Ref Range    Glucose (POC) 344 (H) 70 - 110 mg/dL   CBC WITH AUTOMATED DIFF    Collection Time: 11/17/18  1:09 AM   Result Value Ref Range    WBC 8.9 4.6 - 13.2 K/uL    RBC 4.23 4.20 - 5.30 M/uL    HGB 12.7 12.0 - 16.0 g/dL    HCT 37.5 35.0 - 45.0 %    MCV 88.7 74.0 - 97.0 FL    MCH 30.0 24.0 - 34.0 PG    MCHC 33.9 31.0 - 37.0 g/dL    RDW 13.7 11.6 - 14.5 %    PLATELET 936 900 - 275 K/uL    MPV 9.3 9.2 - 11.8 FL    NEUTROPHILS 91 (H) 40 - 73 %    LYMPHOCYTES 8 (L) 21 - 52 %    MONOCYTES 1 (L) 3 - 10 %    EOSINOPHILS 0 0 - 5 %    BASOPHILS 0 0 - 2 %    ABS. NEUTROPHILS 8.2 (H) 1.8 - 8.0 K/UL    ABS. LYMPHOCYTES 0.7 (L) 0.9 - 3.6 K/UL    ABS. MONOCYTES 0.1 0.05 - 1.2 K/UL    ABS. EOSINOPHILS 0.0 0.0 - 0.4 K/UL    ABS. BASOPHILS 0.0 0.0 - 0.1 K/UL    DF AUTOMATED     METABOLIC PANEL, COMPREHENSIVE    Collection Time: 11/17/18  1:09 AM   Result Value Ref Range    Sodium 134 (L) 136 - 145 mmol/L    Potassium 4.2 3.5 - 5.5 mmol/L    Chloride 101 100 - 108 mmol/L    CO2 25 21 - 32 mmol/L    Anion gap 8 3.0 - 18 mmol/L    Glucose 439 (HH) 74 - 99 mg/dL    BUN 15 7.0 - 18 MG/DL    Creatinine 1.17 0.6 - 1.3 MG/DL    BUN/Creatinine ratio 13 12 - 20      GFR est AA 57 (L) >60 ml/min/1.73m2    GFR est non-AA 47 (L) >60 ml/min/1.73m2    Calcium 8.6 8.5 - 10.1 MG/DL    Bilirubin, total 0.4 0.2 - 1.0 MG/DL    ALT (SGPT) 74 (H) 13 - 56 U/L    AST (SGOT) 36 15 - 37 U/L    Alk.  phosphatase 87 45 - 117 U/L    Protein, total 7.0 6.4 - 8.2 g/dL    Albumin 3.3 (L) 3.4 - 5.0 g/dL Globulin 3.7 2.0 - 4.0 g/dL    A-G Ratio 0.9 0.8 - 1.7     GLUCOSE, POC    Collection Time: 11/17/18  3:29 AM   Result Value Ref Range    Glucose (POC) 342 (H) 70 - 110 mg/dL          Scheduled Medications Reviewed:  Current Facility-Administered Medications   Medication Dose Route Frequency    insulin lispro (HUMALOG) injection   SubCUTAneous AC&HS    enoxaparin (LOVENOX) injection 40 mg  40 mg SubCUTAneous Q24H    aspirin delayed-release tablet 81 mg  81 mg Oral DAILY    azithromycin (ZITHROMAX) tablet 250 mg  250 mg Oral DAILY    fluticasone (FLONASE) 50 mcg/actuation nasal spray 2 Spray  2 Spray Both Nostrils DAILY    insulin glargine (LANTUS) injection 30 Units  30 Units SubCUTAneous QHS    lisinopril (PRINIVIL, ZESTRIL) tablet 20 mg  20 mg Oral BID    montelukast (SINGULAIR) tablet 10 mg  10 mg Oral QHS    albuterol-ipratropium (DUO-NEB) 2.5 MG-0.5 MG/3 ML  3 mL Nebulization 02H RT    methylPREDNISolone (PF) (SOLU-MEDROL) injection 60 mg  60 mg IntraVENous Q6H    levoFLOXacin (LEVAQUIN) 750 mg in D5W IVPB  750 mg IntraVENous Q24H         Imaging, microbiology, and EKG/Telemetry:  Sputum Cx pending    Assessment/Plan   60 y.o. female with PMH COPD on home O2, HTN, DM, Diastolic CHF, SHERRIE , now admitted with COPD Exacerbation.     COPD Exacerbation  DDX: COPD exacerbation, Asthma exacerbation, PNA   Patient with history of tobacco use, COPD and Asthma. Patient seen for SOB a least 5 x this past year.  Per GOLD criteria, patient on additional antibiotic for pseudomonal coverage.   -on telemetry  -continue Duonebs q2h per RT  -continue Methylprednisolone 60 mg q6h  -continue Azithromycin 250 mg PO  -continue Levaquin 750 mg qd, patient allergic to cephalosporins  -Continue supplemental O2  -Continue Singluar 10 mg QD  -hold Advair 1 puff BID  -hold Spiriva 1.25 mcg/act 2 puffs daily  -Hold Spiriva and Advair until patient improves  -continue flonase  -hold cetirizine 10 mg QD  -CPAP at night  -Daily BMP, CBC  -sputum culture and gram stain  -pulm consult in am     Transaminitis, Resolved AST 68>74, ALT 83>36  Possibly due to recent addition of azithromycin to medications. Repeat CMP showed improved AST and ALT, possible transient increase in liver enzymes.  -Continue to monitor     Diastolic CHF  Patient troponin wnl and BNP 81. ECHO 7/2018 EF 66-70% with no wma. LVH. -continue ASA 81 mg qd  -continue lisinopril 20 mg BID     HTN, stable  On admission /89 that decreased to 152/73 before transfer to the floor.  -continue lisinopril 20 mg BID     DM    10/2018 HgbA1c 7.7  Patient had POC glucose of 344 and she was given lantus 30 units and 8 units of lispro. He Glucose an hour later was 439. Patient's next Glucose  and patient given 8 units of lispro.  -continue glargine 30 units every day  -correctional insulin scale  -Glucose checks ACHS     SHERRIE  -CPAP at night     GERD  -continue protonix 40 qd  -continue pepcid 20 mg PRN  -continue simethicone 80 mg PRN  -hold omeprazole 40 mg QD     Frontal sinus osteoma  Found on CT scan of sinuses during admission in July 2018. Non-obstructing.  Patient followed by [facility] ENT Dr Handy Perry. Next appointment in Wednesday and had a CT Scan yesterday.         Diet: Diabetic  DVT Prophylaxis:lovenox  Code Status: Full  Point of Contact: Caryn (daughter) [phone]     Disposition and anticipated LOS: <2 [address] [facility] Kelli PGY-1  11/17/18 7:07 AM

## 2018-11-17 NOTE — ROUTINE PROCESS
Patient alert, meds given. Accucheck 344, Dr Kelli Whiteside made aware. Orders received for coverage. Nebulizer treatment given as ordered. No distress noted. Call bell in reach. 0355  Lab called glucose of 439. Accucheck taken at 0330 was 342. Dr Kelli Whiteside was made aware and orders received for insulin. Patient with no complaints. Continue to monitor. 0600  Patient alert, no distress noted. Nebulizer treatment given as ordered. Call bell in reach. 0730  Bedside, Verbal and Written shift change report given to [address] [place] [address] (oncoming nurse) by Charlie Ruano RN (offgoing nurse). Report included the following information SBAR, Kardex, MAR and Accordion.
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
## 2018-11-17 NOTE — PROGRESS NOTES
Would recommend Q2PRN nebulizer with Albuterol and Q4 hour Duoneb due to avoiding giving Atrovent given too frequently and Patient refusal for frequent treatment.

## 2018-11-17 NOTE — CONSULTS
[facility]  Pulmonary, Critical Care, and Sleep Medicine    Name: Mansoor Paulino MRN: [id number]  : 1960 Hospital: [address] [place] [address]  Date: 2018       Pulmonary Initial In-Patient Consult                                              Consult requesting physician: Julia Decker MD  Reason for Consult: COPD exac    IMPRESSION & PLAN:  Patient Active Problem List   Diagnosis Code    Essential hypertension, benign I10    Diabetes mellitus, type 2 ([facility] E11.9    Esophageal reflux K21.9    SHERRIE on CPAP G47.33, Z99.89    COPD (chronic obstructive pulmonary disease) ([facility] J44.9    Allergic rhinitis J30.9    COPD with acute exacerbation ([facility] J44.1    Obesity, Class III, BMI 40-49.9 (morbid obesity) ([facility] E66.01    Chest pain R07.9    Dyspnea R06.00    COPD exacerbation ([facility] J44.1    Acute exacerbation of COPD with asthma ([facility] J44.1, G86.807    Diastolic CHF, chronic ([facility]) I50.32    Diverticulosis K57.90    COPD with acute bronchitis ([facility] J44.0, J20.9    Hyperlipidemia E78.5    Type 2 diabetes with nephropathy ([facility] E11.21    Bilateral carotid bruits R09.89    Palpitations R00.2    Acute exacerbation of chronic obstructive pulmonary disease (COPD) ([facility]) J44.1     Acute resp failure with hypoxia - Titrate FiO2 to keep Sat >90%. Use bipap with sleep  Acute exac of COPD - Cont with solumedrol and duonebs. Cont with levaquin. Stop zithro  SHERRIE - Pt uses CPAP at home. Use Bipap with sleep for now  Allergic rhinitis - Treat with singulair and claritin and flonase  DVT and GI Proph - Pepcid and Lovenox    Other issues management by primary team and respective consultants. Further recommendations will be based on the patient's response to recommended treatment and results of the investigation ordered. Discussed with patient, answered all questions to her satisfaction.      Subjective/History:    Mansoor Paulino is a 60 y.o. female with PMHx significant for COPD who comes in with c/o increased dyspnea for 2 weeks. Pt reports she had been treated with a  Course of prednisone and Z-milka as outpt by her PCP but she did not feel better. Pt has cough with minimal sputum production. C/o wheezing. No hx of fever or chills. Review of Systems:   HEENT: No epistaxis, C/o nasal drainage and congestion, no redness in eyes  Respiratory: as above  Cardiovascular: no chest pain, no palpitations, no chronic leg edema, no syncope  Gastrointestinal: no abd pain, no vomiting, no diarrhea, no bleeding symptoms  Genitourinary: No urinary symptoms or hematuria  Integument/breast: No ulcers or rashes  Musculoskeletal:Neg  Neurological: No focal weakness, no seizures, no headaches  Constitutional: No fever, no chills, no weight loss, no night sweats     Allergies   Allergen Reactions    Ancef [Cefazolin] Hives    Contrast Agent [Iodine] Anaphylaxis, Shortness of Breath and Swelling     Needs pre-medication for IV contrast with Benadryl, Solu-Medrol    Fish Containing Products Anaphylaxis     Pt states she had a severe allergic reaction at 9 y/o.  Metformin Other (comments)     Abdominal pain, diarrhea.     Codeine Other (comments)     Altered mental status        Past Medical History:   Diagnosis Date    Asthma     Chronic lung disease     COPD     Cystocele, midline     Diabetes mellitus ([facility]     GERD (gastroesophageal reflux disease)     Hidradenitis suppurativa     Hyperlipidemia     Hypertension     SHERRIE on CPAP     CPAP    Stress incontinence         Past Surgical History:   Procedure Laterality Date    BREAST SURGERY PROCEDURE UNLISTED      Right breast benign tumor removal    HX APPENDECTOMY      HX CHOLECYSTECTOMY      HX DILATION AND CURETTAGE      Dysfunctional uterine bleeding, thought 2/2 fibroids    HX TUBAL LIGATION          Family History   Problem Relation Age of Onset    Hypertension Mother     Stroke Mother     Breast Cancer Mother Bilateral mastectomies    Cancer Mother         ovarian and breast    Heart Failure Mother     Heart Attack Father         2011    Heart Surgery Father         CABG    Heart Failure Father     COPD Sister         Heavy smoker    Cancer Sister         ovarian    Heart Failure Sister     Lung Disease Sister     Asthma Child     Cancer Maternal Aunt         pancreatic    Cancer Maternal Grandfather         stomach        Social History     Tobacco Use    Smoking status: Former Smoker     Packs/day: 1.00     Years: 30.00     Pack years: 30.00     Types: Cigarettes     Start date: 1966     Last attempt to quit: 2006     Years since quittin.2    Smokeless tobacco: Never Used    Tobacco comment: 1-1.5 packs per day   Substance Use Topics    Alcohol use: No        Prior to Admission medications    Medication Sig Start Date End Date Taking? Authorizing Provider   azithromycin (ZITHROMAX) 250 mg tablet Take 1 Tab by mouth daily for 84 days. 10/11/18 1/3/19 Yes Jean-Paul Barnes MD   albuterol sulfate 90 mcg/actuation aepb Take 1 Puff by inhalation every four (4) hours. 18  Yes John Hazel MD   insulin glargine (LANTUS,BASAGLAR) 100 unit/mL (3 mL) inpn 30 Units by SubCUTAneous route nightly. Indications: type 2 diabetes mellitus 7/10/18  Yes Jimena Seth MD   NOVOLOG FLEXPEN U-100 INSULIN 100 unit/mL inpn Continue home Sliding scale insulin as prior to admission 7/10/18  Yes Jimena Seth MD   fluticasone-salmeterol (ADVAIR DISKUS) 100-50 mcg/dose diskus inhaler Take 1 Puff by inhalation every twelve (12) hours. 7/10/18  Yes Jimena Seth MD   albuterol (PROVENTIL VENTOLIN) 2.5 mg /3 mL (0.083 %) nebulizer solution 3 mL by Nebulization route every four (4) hours as needed for Wheezing. Indications: BRONCHOSPASM PREVENTION, Chronic Obstructive Pulmonary Disease 3/15/18  Yes Cornelio Abreu MD   omeprazole (PRILOSEC) 40 mg capsule Take 40 mg by mouth daily.  Indications: gastroesophageal reflux disease   Yes Provider, Historical   lisinopril (PRINIVIL, ZESTRIL) 40 mg tablet Take 20 mg by mouth two (2) times a day. Indications: hypertension   Yes Provider, Historical   simethicone (MYLICON) 80 mg chewable tablet Take 80 mg by mouth every six (6) hours as needed for Flatulence. Yes Provider, Historical   cpap machine kit by Does Not Apply route nightly. Yes Provider, Historical   OXYGEN-AIR DELIVERY SYSTEMS 2 L by Nasal route continuous. [employer]   Yes Provider, Historical   aspirin delayed-release 81 mg tablet Take 81 mg by mouth daily. Yes Provider, Historical   famotidine (PEPCID) 20 mg tablet Take 20 mg by mouth two (2) times daily as needed. Yes Provider, Historical   albuterol (PROVENTIL HFA, VENTOLIN HFA, PROAIR HFA) 90 mcg/actuation inhaler Take 2 Puffs by inhalation every four (4) hours as needed for Wheezing. For the next 2 days after hospital discharge, use your inhaler 4 times a day. 3/9/16  Yes Daria Prajapati MD   fluticasone [facility]) 50 mcg/actuation nasal spray 2 Sprays by Both Nostrils route daily. Yes Provider, Historical   montelukast (SINGULAIR) 10 mg tablet Take 10 mg by mouth nightly. Yes Other, MD Lauren   ipratropium (ATROVENT) 0.02 % soln 2.5 mL by Nebulization route four (4) times daily as needed.  Indications: BRONCHOSPASM PREVENTION WITH COPD 3/15/18   Sarah Beth RICHARD MD       Current Facility-Administered Medications   Medication Dose Route Frequency    insulin lispro (HUMALOG) injection   SubCUTAneous AC&HS    albuterol-ipratropium (DUO-NEB) 2.5 MG-0.5 MG/3 ML  3 mL Nebulization Q4H RT    acetaminophen (TYLENOL) tablet 650 mg  650 mg Oral Q4H PRN    enoxaparin (LOVENOX) injection 40 mg  40 mg SubCUTAneous Q24H    aspirin delayed-release tablet 81 mg  81 mg Oral DAILY    azithromycin (ZITHROMAX) tablet 250 mg  250 mg Oral DAILY    famotidine (PEPCID) tablet 20 mg  20 mg Oral BID PRN    fluticasone (FLONASE) 50 mcg/actuation nasal spray 2 Spray  2 Spray Both Nostrils DAILY    insulin glargine (LANTUS) injection 30 Units  30 Units SubCUTAneous QHS    lisinopril (PRINIVIL, ZESTRIL) tablet 20 mg  20 mg Oral BID    montelukast (SINGULAIR) tablet 10 mg  10 mg Oral QHS    simethicone (MYLICON) tablet 80 mg  80 mg Oral Q6H PRN    methylPREDNISolone (PF) (SOLU-MEDROL) injection 60 mg  60 mg IntraVENous Q6H    glucose chewable tablet 16 g  4 Tab Oral PRN    glucagon (GLUCAGEN) injection 1 mg  1 mg IntraMUSCular PRN    dextrose (D50W) injection syrg 12.5-25 g  25-50 mL IntraVENous PRN    levoFLOXacin (LEVAQUIN) 750 mg in D5W IVPB  750 mg IntraVENous Q24H       Objective:  Vital Signs:    Visit Vitals  /66   Pulse 88   Temp 97.5 °F (36.4 °C)   Resp 19   Ht 5' 3\" (1.6 m)   Wt 113.8 kg (250 lb 14.1 oz)   SpO2 95%   BMI 44.44 kg/m²      O2 Device: CPAP mask, Nasal cannula  O2 Flow Rate (L/min): 3 l/min  Temp (24hrs), Av.6 °F (36.4 °C), Min:97 °F (36.1 °C), Max:98.1 °F (36.7 °C)      Physical Exam:   General/Neurology: Alert, Awake,   Head:   Normocephalic, without obvious abnormality  Eye:   PERRL, EOM intact  Oral:   Mucus membranes moist  Neck:   Supple  Lung:   B/l air entry is decreased, no wheezing present  Heart:   Regular rate & rhythm. S1 S2 present.   Abdomen/: Soft, non tender, BS +nt   Extremities:  No pedal edema  Skin:   Dry, intact    Data:      Recent Results (from the past 24 hour(s))   EKG, 12 LEAD, INITIAL    Collection Time: 18  5:05 PM   Result Value Ref Range    Ventricular Rate 94 BPM    Atrial Rate 94 BPM    P-R Interval 146 ms    QRS Duration 84 ms    Q-T Interval 374 ms    QTC Calculation (Bezet) 467 ms    Calculated P Axis 46 degrees    Calculated R Axis 35 degrees    Calculated T Axis 39 degrees    Diagnosis       Normal sinus rhythm  Normal ECG  When compared with ECG of 2018 18:16,  No significant change was found  Confirmed by Liz Morelos MD, [facility] ([id number]) on 2018 8:33:33 AM     CBC WITH AUTOMATED DIFF    Collection Time: 11/16/18  5:12 PM   Result Value Ref Range    WBC 8.0 4.6 - 13.2 K/uL    RBC 4.51 4.20 - 5.30 M/uL    HGB 13.7 12.0 - 16.0 g/dL    HCT 39.7 35.0 - 45.0 %    MCV 88.0 74.0 - 97.0 FL    MCH 30.4 24.0 - 34.0 PG    MCHC 34.5 31.0 - 37.0 g/dL    RDW 13.8 11.6 - 14.5 %    PLATELET 553 307 - 023 K/uL    MPV 9.1 (L) 9.2 - 11.8 FL    NEUTROPHILS 61 40 - 73 %    LYMPHOCYTES 32 21 - 52 %    MONOCYTES 6 3 - 10 %    EOSINOPHILS 1 0 - 5 %    BASOPHILS 0 0 - 2 %    ABS. NEUTROPHILS 4.8 1.8 - 8.0 K/UL    ABS. LYMPHOCYTES 2.6 0.9 - 3.6 K/UL    ABS. MONOCYTES 0.5 0.05 - 1.2 K/UL    ABS. EOSINOPHILS 0.1 0.0 - 0.4 K/UL    ABS. BASOPHILS 0.0 0.0 - 0.1 K/UL    DF AUTOMATED     METABOLIC PANEL, COMPREHENSIVE    Collection Time: 11/16/18  5:12 PM   Result Value Ref Range    Sodium 138 136 - 145 mmol/L    Potassium 3.5 3.5 - 5.5 mmol/L    Chloride 103 100 - 108 mmol/L    CO2 27 21 - 32 mmol/L    Anion gap 8 3.0 - 18 mmol/L    Glucose 241 (H) 74 - 99 mg/dL    BUN 15 7.0 - 18 MG/DL    Creatinine 1.02 0.6 - 1.3 MG/DL    BUN/Creatinine ratio 15 12 - 20      GFR est AA >60 >60 ml/min/1.73m2    GFR est non-AA 55 (L) >60 ml/min/1.73m2    Calcium 8.9 8.5 - 10.1 MG/DL    Bilirubin, total 0.4 0.2 - 1.0 MG/DL    ALT (SGPT) 83 (H) 13 - 56 U/L    AST (SGOT) 68 (H) 15 - 37 U/L    Alk.  phosphatase 92 45 - 117 U/L    Protein, total 7.6 6.4 - 8.2 g/dL    Albumin 3.6 3.4 - 5.0 g/dL    Globulin 4.0 2.0 - 4.0 g/dL    A-G Ratio 0.9 0.8 - 1.7     NT-PRO BNP    Collection Time: 11/16/18  5:12 PM   Result Value Ref Range    NT pro-BNP 81 0 - 900 PG/ML   MAGNESIUM    Collection Time: 11/16/18  5:12 PM   Result Value Ref Range    Magnesium 1.8 1.6 - 2.6 mg/dL   CARDIAC PANEL,(CK, CKMB & TROPONIN)    Collection Time: 11/16/18  5:12 PM   Result Value Ref Range    CK 85 26 - 192 U/L    CK - MB 1.2 <3.6 ng/ml    CK-MB Index 1.4 0.0 - 4.0 %    Troponin-I, Qt. <0.02 0.0 - 0.045 NG/ML   GLUCOSE, POC    Collection Time: 11/17/18 12:00 AM Result Value Ref Range    Glucose (POC) 344 (H) 70 - 110 mg/dL   CBC WITH AUTOMATED DIFF    Collection Time: 11/17/18  1:09 AM   Result Value Ref Range    WBC 8.9 4.6 - 13.2 K/uL    RBC 4.23 4.20 - 5.30 M/uL    HGB 12.7 12.0 - 16.0 g/dL    HCT 37.5 35.0 - 45.0 %    MCV 88.7 74.0 - 97.0 FL    MCH 30.0 24.0 - 34.0 PG    MCHC 33.9 31.0 - 37.0 g/dL    RDW 13.7 11.6 - 14.5 %    PLATELET 566 745 - 979 K/uL    MPV 9.3 9.2 - 11.8 FL    NEUTROPHILS 91 (H) 40 - 73 %    LYMPHOCYTES 8 (L) 21 - 52 %    MONOCYTES 1 (L) 3 - 10 %    EOSINOPHILS 0 0 - 5 %    BASOPHILS 0 0 - 2 %    ABS. NEUTROPHILS 8.2 (H) 1.8 - 8.0 K/UL    ABS. LYMPHOCYTES 0.7 (L) 0.9 - 3.6 K/UL    ABS. MONOCYTES 0.1 0.05 - 1.2 K/UL    ABS. EOSINOPHILS 0.0 0.0 - 0.4 K/UL    ABS. BASOPHILS 0.0 0.0 - 0.1 K/UL    DF AUTOMATED     METABOLIC PANEL, COMPREHENSIVE    Collection Time: 11/17/18  1:09 AM   Result Value Ref Range    Sodium 134 (L) 136 - 145 mmol/L    Potassium 4.2 3.5 - 5.5 mmol/L    Chloride 101 100 - 108 mmol/L    CO2 25 21 - 32 mmol/L    Anion gap 8 3.0 - 18 mmol/L    Glucose 439 (HH) 74 - 99 mg/dL    BUN 15 7.0 - 18 MG/DL    Creatinine 1.17 0.6 - 1.3 MG/DL    BUN/Creatinine ratio 13 12 - 20      GFR est AA 57 (L) >60 ml/min/1.73m2    GFR est non-AA 47 (L) >60 ml/min/1.73m2    Calcium 8.6 8.5 - 10.1 MG/DL    Bilirubin, total 0.4 0.2 - 1.0 MG/DL    ALT (SGPT) 74 (H) 13 - 56 U/L    AST (SGOT) 36 15 - 37 U/L    Alk.  phosphatase 87 45 - 117 U/L    Protein, total 7.0 6.4 - 8.2 g/dL    Albumin 3.3 (L) 3.4 - 5.0 g/dL    Globulin 3.7 2.0 - 4.0 g/dL    A-G Ratio 0.9 0.8 - 1.7     GLUCOSE, POC    Collection Time: 11/17/18  3:29 AM   Result Value Ref Range    Glucose (POC) 342 (H) 70 - 110 mg/dL   GLUCOSE, POC    Collection Time: 11/17/18  7:22 AM   Result Value Ref Range    Glucose (POC) 280 (H) 70 - 110 mg/dL   GLUCOSE, POC    Collection Time: 11/17/18 11:11 AM   Result Value Ref Range    Glucose (POC) 364 (H) 70 - 110 mg/dL         Chemistry   Recent Labs 11/17/18  0109 11/16/18  1712 11/14/18  1806   * 241* 168*   * 138 140   K 4.2 3.5 4.0    103 102   CO2 25 27 27   BUN 15 15 11   CREA 1.17 1.02 0.81   CA 8.6 8.9 9.4   MG  --  1.8  --    AGAP 8 8 11   BUCR 13 15 14   AP 87 92 104   TP 7.0 7.6 7.5   ALB 3.3* 3.6 3.9   GLOB 3.7 4.0 3.6   AGRAT 0.9 0.9 1.1       CBC w/Diff   Recent Labs     11/17/18  0109 11/16/18  1712 11/14/18  1806   WBC 8.9 8.0 5.8   RBC 4.23 4.51 4.58   HGB 12.7 13.7 14.0   HCT 37.5 39.7 39.9    295 284   GRANS 91* 61 57   LYMPH 8* 32 34   EOS 0 1 1       XR (Most Recent). CXR reviewed by me and compared with previous CXR   Results from Hospital Encounter encounter on 11/16/18   XR CHEST PORT    Narrative CHEST AP    CPT CODE: 03357      HISTORY: Wheezing and shortness of breath. COMPARISONS: 11/14/2018. FINDINGS:    Single AP upright view of the chest performed. The lungs are clear and remain  slightly hyperinflated. The cardiomediastinal silhouette is normal. No effusion  is seen. Bony structures are are unremarkable. Impression IMPRESSION:     No active process. Stable hyperinflation. Thank you for this referral.       Imaging:    I have personally reviewed the patients radiographs    Total care time exclusive of procedures with complex decision making, coordination of care and counseling patient performed and > 50% time spent in face to face evaluation as mentioned above.       David Harvey MD  11/17/2018

## 2018-11-17 NOTE — ROUTINE PROCESS
TRANSFER - IN REPORT:    Verbal report received from Gricel RN(name) on Shine Duran  being received from ED(unit) for routine progression of care      Report consisted of patients Situation, Background, Assessment and   Recommendations(SBAR). Information from the following report(s) SBAR, Kardex, MAR and Accordion was reviewed with the receiving nurse. Opportunity for questions and clarification was provided. Assessment completed upon patients arrival to unit and care assumed.

## 2018-11-18 LAB
ANION GAP SERPL CALC-SCNC: 9 MMOL/L (ref 3–18)
BASOPHILS # BLD: 0 K/UL (ref 0–0.1)
BASOPHILS NFR BLD: 0 % (ref 0–2)
BUN SERPL-MCNC: 19 MG/DL (ref 7–18)
BUN/CREAT SERPL: 18 (ref 12–20)
CALCIUM SERPL-MCNC: 9.4 MG/DL (ref 8.5–10.1)
CHLORIDE SERPL-SCNC: 102 MMOL/L (ref 100–108)
CO2 SERPL-SCNC: 26 MMOL/L (ref 21–32)
CREAT SERPL-MCNC: 1.05 MG/DL (ref 0.6–1.3)
DIFFERENTIAL METHOD BLD: ABNORMAL
EOSINOPHIL # BLD: 0 K/UL (ref 0–0.4)
EOSINOPHIL NFR BLD: 0 % (ref 0–5)
ERYTHROCYTE [DISTWIDTH] IN BLOOD BY AUTOMATED COUNT: 13.8 % (ref 11.6–14.5)
GLUCOSE BLD STRIP.AUTO-MCNC: 292 MG/DL (ref 70–110)
GLUCOSE BLD STRIP.AUTO-MCNC: 334 MG/DL (ref 70–110)
GLUCOSE BLD STRIP.AUTO-MCNC: 359 MG/DL (ref 70–110)
GLUCOSE BLD STRIP.AUTO-MCNC: 409 MG/DL (ref 70–110)
GLUCOSE BLD STRIP.AUTO-MCNC: 420 MG/DL (ref 70–110)
GLUCOSE SERPL-MCNC: 318 MG/DL (ref 74–99)
HCT VFR BLD AUTO: 35.8 % (ref 35–45)
HGB BLD-MCNC: 12.1 G/DL (ref 12–16)
LYMPHOCYTES # BLD: 1 K/UL (ref 0.9–3.6)
LYMPHOCYTES NFR BLD: 7 % (ref 21–52)
MCH RBC QN AUTO: 30.2 PG (ref 24–34)
MCHC RBC AUTO-ENTMCNC: 33.8 G/DL (ref 31–37)
MCV RBC AUTO: 89.3 FL (ref 74–97)
MONOCYTES # BLD: 0.7 K/UL (ref 0.05–1.2)
MONOCYTES NFR BLD: 5 % (ref 3–10)
NEUTS SEG # BLD: 11.8 K/UL (ref 1.8–8)
NEUTS SEG NFR BLD: 88 % (ref 40–73)
PLATELET # BLD AUTO: 279 K/UL (ref 135–420)
PMV BLD AUTO: 9.2 FL (ref 9.2–11.8)
POTASSIUM SERPL-SCNC: 4.5 MMOL/L (ref 3.5–5.5)
RBC # BLD AUTO: 4.01 M/UL (ref 4.2–5.3)
SODIUM SERPL-SCNC: 137 MMOL/L (ref 136–145)
WBC # BLD AUTO: 13.4 K/UL (ref 4.6–13.2)

## 2018-11-18 PROCEDURE — 74011250637 HC RX REV CODE- 250/637: Performed by: STUDENT IN AN ORGANIZED HEALTH CARE EDUCATION/TRAINING PROGRAM

## 2018-11-18 PROCEDURE — 94660 CPAP INITIATION&MGMT: CPT

## 2018-11-18 PROCEDURE — 36415 COLL VENOUS BLD VENIPUNCTURE: CPT

## 2018-11-18 PROCEDURE — 74011636637 HC RX REV CODE- 636/637: Performed by: STUDENT IN AN ORGANIZED HEALTH CARE EDUCATION/TRAINING PROGRAM

## 2018-11-18 PROCEDURE — 74011250637 HC RX REV CODE- 250/637: Performed by: INTERNAL MEDICINE

## 2018-11-18 PROCEDURE — 82962 GLUCOSE BLOOD TEST: CPT

## 2018-11-18 PROCEDURE — 74011636637 HC RX REV CODE- 636/637: Performed by: FAMILY MEDICINE

## 2018-11-18 PROCEDURE — 74011000250 HC RX REV CODE- 250: Performed by: STUDENT IN AN ORGANIZED HEALTH CARE EDUCATION/TRAINING PROGRAM

## 2018-11-18 PROCEDURE — 85025 COMPLETE CBC W/AUTO DIFF WBC: CPT

## 2018-11-18 PROCEDURE — 74011250636 HC RX REV CODE- 250/636: Performed by: STUDENT IN AN ORGANIZED HEALTH CARE EDUCATION/TRAINING PROGRAM

## 2018-11-18 PROCEDURE — 80048 BASIC METABOLIC PNL TOTAL CA: CPT

## 2018-11-18 PROCEDURE — 94640 AIRWAY INHALATION TREATMENT: CPT

## 2018-11-18 PROCEDURE — 65660000000 HC RM CCU STEPDOWN

## 2018-11-18 RX ORDER — INSULIN LISPRO 100 [IU]/ML
5 INJECTION, SOLUTION INTRAVENOUS; SUBCUTANEOUS
Status: DISCONTINUED | OUTPATIENT
Start: 2018-11-18 | End: 2018-11-19

## 2018-11-18 RX ADMIN — IPRATROPIUM BROMIDE AND ALBUTEROL SULFATE 3 ML: .5; 3 SOLUTION RESPIRATORY (INHALATION) at 11:16

## 2018-11-18 RX ADMIN — METHYLPREDNISOLONE SODIUM SUCCINATE 60 MG: 40 INJECTION, POWDER, FOR SOLUTION INTRAMUSCULAR; INTRAVENOUS at 12:09

## 2018-11-18 RX ADMIN — FAMOTIDINE 20 MG: 20 TABLET ORAL at 17:19

## 2018-11-18 RX ADMIN — LEVOFLOXACIN 750 MG: 5 INJECTION, SOLUTION INTRAVENOUS at 22:41

## 2018-11-18 RX ADMIN — IPRATROPIUM BROMIDE AND ALBUTEROL SULFATE 3 ML: .5; 3 SOLUTION RESPIRATORY (INHALATION) at 19:26

## 2018-11-18 RX ADMIN — INSULIN LISPRO 5 UNITS: 100 INJECTION, SOLUTION INTRAVENOUS; SUBCUTANEOUS at 15:40

## 2018-11-18 RX ADMIN — METHYLPREDNISOLONE SODIUM SUCCINATE 60 MG: 40 INJECTION, POWDER, FOR SOLUTION INTRAMUSCULAR; INTRAVENOUS at 22:41

## 2018-11-18 RX ADMIN — METHYLPREDNISOLONE SODIUM SUCCINATE 60 MG: 40 INJECTION, POWDER, FOR SOLUTION INTRAMUSCULAR; INTRAVENOUS at 01:29

## 2018-11-18 RX ADMIN — IPRATROPIUM BROMIDE AND ALBUTEROL SULFATE 3 ML: .5; 3 SOLUTION RESPIRATORY (INHALATION) at 15:55

## 2018-11-18 RX ADMIN — INSULIN LISPRO 10 UNITS: 100 INJECTION, SOLUTION INTRAVENOUS; SUBCUTANEOUS at 23:43

## 2018-11-18 RX ADMIN — MONTELUKAST SODIUM 10 MG: 10 TABLET, COATED ORAL at 22:41

## 2018-11-18 RX ADMIN — IPRATROPIUM BROMIDE AND ALBUTEROL SULFATE 3 ML: .5; 3 SOLUTION RESPIRATORY (INHALATION) at 03:24

## 2018-11-18 RX ADMIN — FAMOTIDINE 20 MG: 20 TABLET ORAL at 08:20

## 2018-11-18 RX ADMIN — LEVOFLOXACIN 750 MG: 5 INJECTION, SOLUTION INTRAVENOUS at 01:29

## 2018-11-18 RX ADMIN — INSULIN GLARGINE 30 UNITS: 100 INJECTION, SOLUTION SUBCUTANEOUS at 23:42

## 2018-11-18 RX ADMIN — ASPIRIN 81 MG: 81 TABLET, COATED ORAL at 08:20

## 2018-11-18 RX ADMIN — ENOXAPARIN SODIUM 40 MG: 100 INJECTION SUBCUTANEOUS at 22:41

## 2018-11-18 RX ADMIN — LISINOPRIL 20 MG: 20 TABLET ORAL at 17:19

## 2018-11-18 RX ADMIN — METHYLPREDNISOLONE SODIUM SUCCINATE 60 MG: 40 INJECTION, POWDER, FOR SOLUTION INTRAMUSCULAR; INTRAVENOUS at 17:19

## 2018-11-18 RX ADMIN — INSULIN LISPRO 6 UNITS: 100 INJECTION, SOLUTION INTRAVENOUS; SUBCUTANEOUS at 08:20

## 2018-11-18 RX ADMIN — IPRATROPIUM BROMIDE AND ALBUTEROL SULFATE 3 ML: .5; 3 SOLUTION RESPIRATORY (INHALATION) at 10:05

## 2018-11-18 RX ADMIN — FLUTICASONE PROPIONATE 2 SPRAY: 50 SPRAY, METERED NASAL at 10:26

## 2018-11-18 RX ADMIN — LISINOPRIL 20 MG: 20 TABLET ORAL at 08:20

## 2018-11-18 RX ADMIN — INSULIN LISPRO 10 UNITS: 100 INJECTION, SOLUTION INTRAVENOUS; SUBCUTANEOUS at 15:41

## 2018-11-18 RX ADMIN — METHYLPREDNISOLONE SODIUM SUCCINATE 60 MG: 40 INJECTION, POWDER, FOR SOLUTION INTRAMUSCULAR; INTRAVENOUS at 06:03

## 2018-11-18 RX ADMIN — INSULIN LISPRO 10 UNITS: 100 INJECTION, SOLUTION INTRAVENOUS; SUBCUTANEOUS at 12:13

## 2018-11-18 NOTE — ROUTINE PROCESS
Ms. Kumar Logan wishes not to go on V60 Bipap machine at this time. I will administer quality respiratory care until properly relieved.

## 2018-11-18 NOTE — PROGRESS NOTES
[facility]  Pulmonary, Critical Care, and Sleep Medicine    Pulmonary Medicine Progress Note    Name: Bar Hector MRN: [id number]  : 1960 Hospital: [facility]  Date: 2018       IMPRESSION:  Patient Active Problem List   Diagnosis Code    Essential hypertension, benign I10    Diabetes mellitus, type 2 ([facility] E11.9    Esophageal reflux K21.9    SHERRIE on CPAP G47.33, Z99.89    COPD (chronic obstructive pulmonary disease) ([facility] J44.9    Allergic rhinitis J30.9    COPD with acute exacerbation ([facility]) J44.1    Obesity, Class III, BMI 40-49.9 (morbid obesity) ([facility]) E66.01    Chest pain R07.9    Dyspnea R06.00    COPD exacerbation ([facility]) J44.1    Acute exacerbation of COPD with asthma ([facility]) J44.1, R53.602    Diastolic CHF, chronic ([facility]) I50.32    Diverticulosis K57.90    COPD with acute bronchitis ([facility]) J44.0, J20.9    Hyperlipidemia E78.5    Type 2 diabetes with nephropathy ([facility]) E11.21    Bilateral carotid bruits R09.89    Palpitations R00.2    Acute exacerbation of chronic obstructive pulmonary disease (COPD) ([facility]) J44.1       PLAN:  Acute resp failure with hypoxia - Titrate FiO2 to keep Sat >90%. Use bipap with sleep  Acute exac of COPD - Cont with solumedrol and duonebs. No taper of steroids today  SHERRIE - Pt uses CPAP at home. Use Bipap with sleep for now  Allergic rhinitis - Treat with singulair and claritin and flonase  Ac bronchitis - Cont with Levaquin. WBC count is mildy elevated but afebrile. DVT and GI Proph - Pepcid and Lovenox    FiO2 to keep SpO2 >=90%, HOB >=30 degree, aspiration precautions, aggressive pulmonary toileting, incentive spirometry. Other issues management by primary team and respective consultants. Events and notes from last 24 hours reviewed. Discussed with patient, answered all questions to her satisfaction. Care plan discussed with nursing.      Labs and images personally seen and available reports reviewed  All current medicines are reviewed     Subjective/Interval History:  Pt says she still has sig dyspnea specially with minimal exertion.  Occ cough    Medications- Current:  Current Facility-Administered Medications   Medication Dose Route Frequency    insulin lispro (HUMALOG) injection   SubCUTAneous AC&HS    albuterol-ipratropium (DUO-NEB) 2.5 MG-0.5 MG/3 ML  3 mL Nebulization Q4H RT    famotidine (PEPCID) tablet 20 mg  20 mg Oral BID    loratadine (CLARITIN) tablet 10 mg  10 mg Oral DAILY    acetaminophen (TYLENOL) tablet 650 mg  650 mg Oral Q4H PRN    enoxaparin (LOVENOX) injection 40 mg  40 mg SubCUTAneous Q24H    aspirin delayed-release tablet 81 mg  81 mg Oral DAILY    fluticasone (FLONASE) 50 mcg/actuation nasal spray 2 Spray  2 Spray Both Nostrils DAILY    insulin glargine (LANTUS) injection 30 Units  30 Units SubCUTAneous QHS    lisinopril (PRINIVIL, ZESTRIL) tablet 20 mg  20 mg Oral BID    montelukast (SINGULAIR) tablet 10 mg  10 mg Oral QHS    simethicone (MYLICON) tablet 80 mg  80 mg Oral Q6H PRN    methylPREDNISolone (PF) (SOLU-MEDROL) injection 60 mg  60 mg IntraVENous Q6H    glucose chewable tablet 16 g  4 Tab Oral PRN    glucagon (GLUCAGEN) injection 1 mg  1 mg IntraMUSCular PRN    dextrose (D50W) injection syrg 12.5-25 g  25-50 mL IntraVENous PRN    levoFLOXacin (LEVAQUIN) 750 mg in D5W IVPB  750 mg IntraVENous Q24H       Objective:  Vital Signs:    Visit Vitals  /57 (BP 1 Location: Left arm, BP Patient Position: Supine)   Pulse 66   Temp 97.2 °F (36.2 °C)   Resp 17   Ht 5' 3\" (1.6 m)   Wt 115.2 kg (254 lb)   SpO2 97%   BMI 44.99 kg/m²      O2 Device: Nasal cannula  O2 Flow Rate (L/min): 2 l/min  Temp (24hrs), Av.7 °F (36.5 °C), Min:97.2 °F (36.2 °C), Max:98.5 °F (36.9 °C)      Intake/Output:   Last shift:       0701 - 11/18 1900  In: 240 [P.O.:240]  Out: -   Last 3 shifts: 1901 -  07  In: 461 [P.O.:461]  Out: -     Intake/Output Summary (Last 24 hours) at 11/18/2018 1059  Last data filed at 11/18/2018 0843  Gross per 24 hour   Intake 461 ml   Output --   Net 461 ml       Physical Exam:     General/Neurology: Alert, Awake  Head:   Normocephalic, without obvious abnormality  Eye:   PERRL, EOM intact  Oral:  Mucus membranes moist  Lung:   B/l air entry decreased  Heart:   S1 S2 present  Abdomen:  Soft, non tender, BS+nt   Extremities:  No pedal edema. Skin:   Dry, intact  Data:      Recent Results (from the past 24 hour(s))   GLUCOSE, POC    Collection Time: 11/17/18 11:11 AM   Result Value Ref Range    Glucose (POC) 364 (H) 70 - 110 mg/dL   GLUCOSE, POC    Collection Time: 11/17/18  4:00 PM   Result Value Ref Range    Glucose (POC) 391 (H) 70 - 110 mg/dL   GLUCOSE, POC    Collection Time: 11/17/18  9:43 PM   Result Value Ref Range    Glucose (POC) 382 (H) 70 - 110 mg/dL   CBC WITH AUTOMATED DIFF    Collection Time: 11/18/18  2:38 AM   Result Value Ref Range    WBC 13.4 (H) 4.6 - 13.2 K/uL    RBC 4.01 (L) 4.20 - 5.30 M/uL    HGB 12.1 12.0 - 16.0 g/dL    HCT 35.8 35.0 - 45.0 %    MCV 89.3 74.0 - 97.0 FL    MCH 30.2 24.0 - 34.0 PG    MCHC 33.8 31.0 - 37.0 g/dL    RDW 13.8 11.6 - 14.5 %    PLATELET 070 777 - 560 K/uL    MPV 9.2 9.2 - 11.8 FL    NEUTROPHILS 88 (H) 40 - 73 %    LYMPHOCYTES 7 (L) 21 - 52 %    MONOCYTES 5 3 - 10 %    EOSINOPHILS 0 0 - 5 %    BASOPHILS 0 0 - 2 %    ABS. NEUTROPHILS 11.8 (H) 1.8 - 8.0 K/UL    ABS. LYMPHOCYTES 1.0 0.9 - 3.6 K/UL    ABS. MONOCYTES 0.7 0.05 - 1.2 K/UL    ABS. EOSINOPHILS 0.0 0.0 - 0.4 K/UL    ABS.  BASOPHILS 0.0 0.0 - 0.1 K/UL    DF AUTOMATED     METABOLIC PANEL, BASIC    Collection Time: 11/18/18  2:38 AM   Result Value Ref Range    Sodium 137 136 - 145 mmol/L    Potassium 4.5 3.5 - 5.5 mmol/L    Chloride 102 100 - 108 mmol/L    CO2 26 21 - 32 mmol/L    Anion gap 9 3.0 - 18 mmol/L    Glucose 318 (H) 74 - 99 mg/dL    BUN 19 (H) 7.0 - 18 MG/DL    Creatinine 1.05 0.6 - 1.3 MG/DL    BUN/Creatinine ratio 18 12 - 20      GFR est AA >60 >60 ml/min/1.73m2    GFR est non-AA 54 (L) >60 ml/min/1.73m2    Calcium 9.4 8.5 - 10.1 MG/DL   GLUCOSE, POC    Collection Time: 11/18/18  6:18 AM   Result Value Ref Range    Glucose (POC) 292 (H) 70 - 110 mg/dL         Chemistry   Recent Labs     11/18/18  0238 11/17/18  0109 11/16/18  1712   * 439* 241*    134* 138   K 4.5 4.2 3.5    101 103   CO2 26 25 27   BUN 19* 15 15   CREA 1.05 1.17 1.02   CA 9.4 8.6 8.9   MG  --   --  1.8   AGAP 9 8 8   BUCR 18 13 15   AP  --  87 92   TP  --  7.0 7.6   ALB  --  3.3* 3.6   GLOB  --  3.7 4.0   AGRAT  --  0.9 0.9       CBC w/Diff   Recent Labs     11/18/18 0238 11/17/18 0109 11/16/18  1712   WBC 13.4* 8.9 8.0   RBC 4.01* 4.23 4.51   HGB 12.1 12.7 13.7   HCT 35.8 37.5 39.7    295 295   GRANS 88* 91* 61   LYMPH 7* 8* 32   EOS 0 0 1       XR (Most Recent). CXR reviewed by me and compared with previous CXR   Results from Hospital Encounter encounter on 11/16/18   XR CHEST PORT    Narrative CHEST AP    CPT CODE: 09036      HISTORY: Wheezing and shortness of breath. COMPARISONS: 11/14/2018. FINDINGS:    Single AP upright view of the chest performed. The lungs are clear and remain  slightly hyperinflated. The cardiomediastinal silhouette is normal. No effusion  is seen. Bony structures are are unremarkable. Impression IMPRESSION:     No active process. Stable hyperinflation. Thank you for this referral.       See my orders for details     Total care time exclusive of procedures with complex decision making, coordination of care and counseling patient performed and > 50% time spent in face to face evaluation as mentioned above.     Kehinde Diallo MD  11/18/2018

## 2018-11-18 NOTE — ROUTINE PROCESS
MsSacha Lyndeboroughconstantin Slade advised me that she is able to put herself on and off the [place] by herself. I advised MsSacha Lyndeboroughconstantin Slade that I will still need to check on her periodically throughout the shift. I will administer quality respiratory care until properly relived.

## 2018-11-18 NOTE — PROGRESS NOTES
Intern Progress Note  [facility]       Patient: Jenny Landeros MRN: [id number]  CSN: [id number]    YOB: 1960  Age: 60 y.o. Sex: female    DOA: 11/16/2018 LOS:  LOS: 2 days                    Subjective:     Acute events: Patient states that she is doing better than yesterday. She has SOB while walking and using the restroom, but it has improved. She states that she is 60% at her baseline. She denies headache, CP, lightheadedness, abdominal pain, or N/V. Review of Systems   Constitutional: Negative for chills and fever. Eyes: Negative for blurred vision. Respiratory: Positive for cough and shortness of breath. Negative for sputum production and wheezing. Cardiovascular: Negative for chest pain and palpitations. Gastrointestinal: Negative for abdominal pain, nausea and vomiting. Neurological: Negative for dizziness and headaches. Objective:      Patient Vitals for the past 24 hrs:   Temp Pulse Resp BP SpO2   11/18/18 1200 96.7 °F (35.9 °C) 80 19 120/64 98 %   11/18/18 1116 -- -- -- -- 98 %   11/18/18 1006 -- -- -- -- 97 %   11/18/18 0730 97.2 °F (36.2 °C) 66 17 117/57 98 %   11/18/18 0457 97.4 °F (36.3 °C) 75 18 113/72 98 %   11/18/18 0325 -- -- -- -- 98 %   11/18/18 0239 -- -- -- -- 98 %   11/18/18 0004 97.5 °F (36.4 °C) 84 18 106/61 96 %   11/17/18 2303 -- -- -- -- 96 %   11/17/18 2010 98.2 °F (36.8 °C) 83 18 101/58 95 %   11/17/18 1908 -- -- -- -- 97 %   11/17/18 1648 -- -- -- -- 96 %   11/17/18 1602 98.5 °F (36.9 °C) 74 18 103/66 95 %       Intake/Output Summary (Last 24 hours) at 11/18/2018 1212  Last data filed at 11/18/2018 0843  Gross per 24 hour   Intake 461 ml   Output --   Net 461 ml       Physical Exam:  General:  Alert and Responsive and in No acute distress. HEENT: Conjunctiva pink, sclera anicteric. EOMI. Pharynx moist, nonerythematous. Moist mucous membranes. Thyroid not enlarged, no nodules.   No cervical, supraclavicular, occipital or submandibular lymphadenopathy. No other gross abnormalities present. CV:  RRR, no murmurs. No visible pulsations or thrills. RESP:  Unlabored breathing. Lungs clear to auscultation. no wheeze, rales, or rhonchi. Equal expansion bilaterally. On 2L NC. ABD:  Soft, nontender, nondistended. No hepatosplenomegaly. No suprapubic tenderness. No CVA tenderness. MS:  No joint deformity or instability. No atrophy. Neuro:  5/5 strength bilateral upper extremities and lower extremities. A+Ox3. Ext:  No edema. 2+ radial and dp pulses bilaterally. Skin:  No rashes, lesions, or ulcers. Good turgor. Lab/Data Reviewed:  Recent Results (from the past 12 hour(s))   CBC WITH AUTOMATED DIFF    Collection Time: 11/18/18  2:38 AM   Result Value Ref Range    WBC 13.4 (H) 4.6 - 13.2 K/uL    RBC 4.01 (L) 4.20 - 5.30 M/uL    HGB 12.1 12.0 - 16.0 g/dL    HCT 35.8 35.0 - 45.0 %    MCV 89.3 74.0 - 97.0 FL    MCH 30.2 24.0 - 34.0 PG    MCHC 33.8 31.0 - 37.0 g/dL    RDW 13.8 11.6 - 14.5 %    PLATELET 048 370 - 428 K/uL    MPV 9.2 9.2 - 11.8 FL    NEUTROPHILS 88 (H) 40 - 73 %    LYMPHOCYTES 7 (L) 21 - 52 %    MONOCYTES 5 3 - 10 %    EOSINOPHILS 0 0 - 5 %    BASOPHILS 0 0 - 2 %    ABS. NEUTROPHILS 11.8 (H) 1.8 - 8.0 K/UL    ABS. LYMPHOCYTES 1.0 0.9 - 3.6 K/UL    ABS. MONOCYTES 0.7 0.05 - 1.2 K/UL    ABS. EOSINOPHILS 0.0 0.0 - 0.4 K/UL    ABS.  BASOPHILS 0.0 0.0 - 0.1 K/UL    DF AUTOMATED     METABOLIC PANEL, BASIC    Collection Time: 11/18/18  2:38 AM   Result Value Ref Range    Sodium 137 136 - 145 mmol/L    Potassium 4.5 3.5 - 5.5 mmol/L    Chloride 102 100 - 108 mmol/L    CO2 26 21 - 32 mmol/L    Anion gap 9 3.0 - 18 mmol/L    Glucose 318 (H) 74 - 99 mg/dL    BUN 19 (H) 7.0 - 18 MG/DL    Creatinine 1.05 0.6 - 1.3 MG/DL    BUN/Creatinine ratio 18 12 - 20      GFR est AA >60 >60 ml/min/1.73m2    GFR est non-AA 54 (L) >60 ml/min/1.73m2    Calcium 9.4 8.5 - 10.1 MG/DL   GLUCOSE, POC    Collection Time: 11/18/18 6:18 AM   Result Value Ref Range    Glucose (POC) 292 (H) 70 - 110 mg/dL   GLUCOSE, POC    Collection Time: 11/18/18 11:21 AM   Result Value Ref Range    Glucose (POC) 359 (H) 70 - 110 mg/dL   GLUCOSE, POC    Collection Time: 11/18/18 11:49 AM   Result Value Ref Range    Glucose (POC) 334 (H) 70 - 110 mg/dL         Scheduled Medications Reviewed:  Current Facility-Administered Medications   Medication Dose Route Frequency    insulin lispro (HUMALOG) injection   SubCUTAneous AC&HS    albuterol-ipratropium (DUO-NEB) 2.5 MG-0.5 MG/3 ML  3 mL Nebulization Q4H RT    famotidine (PEPCID) tablet 20 mg  20 mg Oral BID    loratadine (CLARITIN) tablet 10 mg  10 mg Oral DAILY    enoxaparin (LOVENOX) injection 40 mg  40 mg SubCUTAneous Q24H    aspirin delayed-release tablet 81 mg  81 mg Oral DAILY    fluticasone (FLONASE) 50 mcg/actuation nasal spray 2 Spray  2 Spray Both Nostrils DAILY    insulin glargine (LANTUS) injection 30 Units  30 Units SubCUTAneous QHS    lisinopril (PRINIVIL, ZESTRIL) tablet 20 mg  20 mg Oral BID    montelukast (SINGULAIR) tablet 10 mg  10 mg Oral QHS    methylPREDNISolone (PF) (SOLU-MEDROL) injection 60 mg  60 mg IntraVENous Q6H    levoFLOXacin (LEVAQUIN) 750 mg in D5W IVPB  750 mg IntraVENous Q24H         Imaging, microbiology, and EKG/Telemetry:  - No recent images    Assessment/Plan   60 y.o. female with PMH COPD on home O2, HTN, DM, Diastolic CHF, SHERRIE , now admitted with COPD Exacerbation.     COPD Exacerbation: Patient with history of tobacco use, COPD and Asthma. Patient seen for SOB a least 5 x this past year. Per GOLD criteria, patient on additional antibiotic for pseudomonal coverage.   - Continue Duonebs q2h per RT  - Continue Methylprednisolone 60 mg q6h  - D/c Azithromycin 250 mg PO per pulmonology  - Continue Levaquin 750 mg qd, patient allergic to cephalosporins  - Continue Singluar 10 mg QD  - Hold Spiriva and Advair until patient improves  - Hold cetirizine 10 mg QD  - Continue flonase  - BiPAP at night  - Follow-up sputum culture and gram stain     Transaminitis: Resolved AST 68>74, ALT 83>36  Possibly due to recent addition of azithromycin to medications. Repeat CMP showed improved AST and ALT, possible transient increase in liver enzymes.  -Continue to monitor     Diastolic CHF: Patient troponin wnl and BNP 81. ECHO 7/2018 EF 66-70% with no wma. LVH. - Continue ASA 81 mg qd  - Continue lisinopril 20 mg BID     HTN: Stable. On admission /89 that decreased to 152/73 before transfer to the floor. Currently 120/64.  - Continue lisinopril 20 mg BID     T2DM: 10/2018 HgbA1c 7.7. Patient had POC glucose of 344 and she was given lantus 30 units and 8 units of lispro. He Glucose an hour later was 439. Patient's next Glucose  and patient given 8 units of lispro. 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  Patient followed by [facility] Dr Evelia Jones. Next appointment in Wednesday and had a CT Scan yesterday.         Diet: Diabetic  DVT Prophylaxis:lovenox  Code Status: Full  Point of Contact: Caryn (daughter) [phone]     Disposition and anticipated LOS: Discharged planned for tomorrow (11/19/18)       MD Kelli García PGY-1  11/18/18 12:12 PM

## 2018-11-18 NOTE — ROUTINE PROCESS
Bedside and Verbal shift change report given to richard randhawa  (oncoming nurse) by Kanwal Manuel RN  (offgoing nurse). Report included the following information SBAR, MAR and Recent Results.

## 2018-11-19 LAB
ANION GAP SERPL CALC-SCNC: 9 MMOL/L (ref 3–18)
BASOPHILS # BLD: 0 K/UL (ref 0–0.1)
BASOPHILS NFR BLD: 0 % (ref 0–2)
BUN SERPL-MCNC: 22 MG/DL (ref 7–18)
BUN/CREAT SERPL: 19 (ref 12–20)
CALCIUM SERPL-MCNC: 9.2 MG/DL (ref 8.5–10.1)
CHLORIDE SERPL-SCNC: 100 MMOL/L (ref 100–108)
CO2 SERPL-SCNC: 27 MMOL/L (ref 21–32)
CREAT SERPL-MCNC: 1.18 MG/DL (ref 0.6–1.3)
DIFFERENTIAL METHOD BLD: ABNORMAL
EOSINOPHIL # BLD: 0 K/UL (ref 0–0.4)
EOSINOPHIL NFR BLD: 0 % (ref 0–5)
ERYTHROCYTE [DISTWIDTH] IN BLOOD BY AUTOMATED COUNT: 13.8 % (ref 11.6–14.5)
GLUCOSE BLD STRIP.AUTO-MCNC: 284 MG/DL (ref 70–110)
GLUCOSE BLD STRIP.AUTO-MCNC: 307 MG/DL (ref 70–110)
GLUCOSE BLD STRIP.AUTO-MCNC: 319 MG/DL (ref 70–110)
GLUCOSE BLD STRIP.AUTO-MCNC: 345 MG/DL (ref 70–110)
GLUCOSE SERPL-MCNC: 357 MG/DL (ref 74–99)
HCT VFR BLD AUTO: 36.5 % (ref 35–45)
HGB BLD-MCNC: 12.2 G/DL (ref 12–16)
LYMPHOCYTES # BLD: 0.6 K/UL (ref 0.9–3.6)
LYMPHOCYTES NFR BLD: 5 % (ref 21–52)
MCH RBC QN AUTO: 30.2 PG (ref 24–34)
MCHC RBC AUTO-ENTMCNC: 33.4 G/DL (ref 31–37)
MCV RBC AUTO: 90.3 FL (ref 74–97)
MONOCYTES # BLD: 0.2 K/UL (ref 0.05–1.2)
MONOCYTES NFR BLD: 2 % (ref 3–10)
NEUTS SEG # BLD: 11.1 K/UL (ref 1.8–8)
NEUTS SEG NFR BLD: 93 % (ref 40–73)
PLATELET # BLD AUTO: 303 K/UL (ref 135–420)
PMV BLD AUTO: 9.5 FL (ref 9.2–11.8)
POTASSIUM SERPL-SCNC: 4.8 MMOL/L (ref 3.5–5.5)
RBC # BLD AUTO: 4.04 M/UL (ref 4.2–5.3)
SODIUM SERPL-SCNC: 136 MMOL/L (ref 136–145)
WBC # BLD AUTO: 11.9 K/UL (ref 4.6–13.2)

## 2018-11-19 PROCEDURE — 74011636637 HC RX REV CODE- 636/637: Performed by: STUDENT IN AN ORGANIZED HEALTH CARE EDUCATION/TRAINING PROGRAM

## 2018-11-19 PROCEDURE — 74011250637 HC RX REV CODE- 250/637: Performed by: STUDENT IN AN ORGANIZED HEALTH CARE EDUCATION/TRAINING PROGRAM

## 2018-11-19 PROCEDURE — 94660 CPAP INITIATION&MGMT: CPT

## 2018-11-19 PROCEDURE — 77010033678 HC OXYGEN DAILY

## 2018-11-19 PROCEDURE — 97530 THERAPEUTIC ACTIVITIES: CPT

## 2018-11-19 PROCEDURE — 85025 COMPLETE CBC W/AUTO DIFF WBC: CPT

## 2018-11-19 PROCEDURE — 74011000250 HC RX REV CODE- 250: Performed by: STUDENT IN AN ORGANIZED HEALTH CARE EDUCATION/TRAINING PROGRAM

## 2018-11-19 PROCEDURE — 94640 AIRWAY INHALATION TREATMENT: CPT

## 2018-11-19 PROCEDURE — 74011250637 HC RX REV CODE- 250/637: Performed by: INTERNAL MEDICINE

## 2018-11-19 PROCEDURE — 74011250636 HC RX REV CODE- 250/636: Performed by: STUDENT IN AN ORGANIZED HEALTH CARE EDUCATION/TRAINING PROGRAM

## 2018-11-19 PROCEDURE — 74011636637 HC RX REV CODE- 636/637: Performed by: FAMILY MEDICINE

## 2018-11-19 PROCEDURE — 97165 OT EVAL LOW COMPLEX 30 MIN: CPT

## 2018-11-19 PROCEDURE — 97116 GAIT TRAINING THERAPY: CPT

## 2018-11-19 PROCEDURE — 82962 GLUCOSE BLOOD TEST: CPT

## 2018-11-19 PROCEDURE — 36415 COLL VENOUS BLD VENIPUNCTURE: CPT

## 2018-11-19 PROCEDURE — 65660000000 HC RM CCU STEPDOWN

## 2018-11-19 PROCEDURE — 80048 BASIC METABOLIC PNL TOTAL CA: CPT

## 2018-11-19 PROCEDURE — 97161 PT EVAL LOW COMPLEX 20 MIN: CPT

## 2018-11-19 RX ORDER — GUAIFENESIN 600 MG/1
600 TABLET, EXTENDED RELEASE ORAL EVERY 12 HOURS
Status: DISCONTINUED | OUTPATIENT
Start: 2018-11-19 | End: 2018-11-20 | Stop reason: HOSPADM

## 2018-11-19 RX ORDER — INSULIN LISPRO 100 [IU]/ML
10 INJECTION, SOLUTION INTRAVENOUS; SUBCUTANEOUS
Status: DISCONTINUED | OUTPATIENT
Start: 2018-11-19 | End: 2018-11-20 | Stop reason: HOSPADM

## 2018-11-19 RX ORDER — INSULIN LISPRO 100 [IU]/ML
INJECTION, SOLUTION INTRAVENOUS; SUBCUTANEOUS
Status: DISCONTINUED | OUTPATIENT
Start: 2018-11-19 | End: 2018-11-20 | Stop reason: HOSPADM

## 2018-11-19 RX ADMIN — FAMOTIDINE 20 MG: 20 TABLET ORAL at 08:51

## 2018-11-19 RX ADMIN — LISINOPRIL 20 MG: 20 TABLET ORAL at 18:30

## 2018-11-19 RX ADMIN — IPRATROPIUM BROMIDE AND ALBUTEROL SULFATE 3 ML: .5; 3 SOLUTION RESPIRATORY (INHALATION) at 01:02

## 2018-11-19 RX ADMIN — INSULIN LISPRO 5 UNITS: 100 INJECTION, SOLUTION INTRAVENOUS; SUBCUTANEOUS at 08:51

## 2018-11-19 RX ADMIN — LISINOPRIL 20 MG: 20 TABLET ORAL at 08:51

## 2018-11-19 RX ADMIN — METHYLPREDNISOLONE SODIUM SUCCINATE 60 MG: 40 INJECTION, POWDER, FOR SOLUTION INTRAMUSCULAR; INTRAVENOUS at 06:26

## 2018-11-19 RX ADMIN — LEVOFLOXACIN 750 MG: 5 INJECTION, SOLUTION INTRAVENOUS at 22:07

## 2018-11-19 RX ADMIN — INSULIN LISPRO 8 UNITS: 100 INJECTION, SOLUTION INTRAVENOUS; SUBCUTANEOUS at 08:52

## 2018-11-19 RX ADMIN — IPRATROPIUM BROMIDE AND ALBUTEROL SULFATE 3 ML: .5; 3 SOLUTION RESPIRATORY (INHALATION) at 08:49

## 2018-11-19 RX ADMIN — GUAIFENESIN 600 MG: 600 TABLET, EXTENDED RELEASE ORAL at 20:21

## 2018-11-19 RX ADMIN — MONTELUKAST SODIUM 10 MG: 10 TABLET, COATED ORAL at 22:06

## 2018-11-19 RX ADMIN — IPRATROPIUM BROMIDE AND ALBUTEROL SULFATE 3 ML: .5; 3 SOLUTION RESPIRATORY (INHALATION) at 20:21

## 2018-11-19 RX ADMIN — FAMOTIDINE 20 MG: 20 TABLET ORAL at 18:30

## 2018-11-19 RX ADMIN — INSULIN GLARGINE 30 UNITS: 100 INJECTION, SOLUTION SUBCUTANEOUS at 22:04

## 2018-11-19 RX ADMIN — INSULIN LISPRO 10 UNITS: 100 INJECTION, SOLUTION INTRAVENOUS; SUBCUTANEOUS at 16:44

## 2018-11-19 RX ADMIN — METHYLPREDNISOLONE SODIUM SUCCINATE 60 MG: 40 INJECTION, POWDER, FOR SOLUTION INTRAMUSCULAR; INTRAVENOUS at 11:52

## 2018-11-19 RX ADMIN — ASPIRIN 81 MG: 81 TABLET, COATED ORAL at 08:51

## 2018-11-19 RX ADMIN — INSULIN LISPRO 12 UNITS: 100 INJECTION, SOLUTION INTRAVENOUS; SUBCUTANEOUS at 17:02

## 2018-11-19 RX ADMIN — FLUTICASONE PROPIONATE 2 SPRAY: 50 SPRAY, METERED NASAL at 08:55

## 2018-11-19 RX ADMIN — METHYLPREDNISOLONE SODIUM SUCCINATE 60 MG: 40 INJECTION, POWDER, FOR SOLUTION INTRAMUSCULAR; INTRAVENOUS at 18:30

## 2018-11-19 RX ADMIN — INSULIN LISPRO 9 UNITS: 100 INJECTION, SOLUTION INTRAVENOUS; SUBCUTANEOUS at 22:03

## 2018-11-19 RX ADMIN — INSULIN LISPRO 8 UNITS: 100 INJECTION, SOLUTION INTRAVENOUS; SUBCUTANEOUS at 11:51

## 2018-11-19 RX ADMIN — ENOXAPARIN SODIUM 40 MG: 100 INJECTION SUBCUTANEOUS at 20:21

## 2018-11-19 RX ADMIN — INSULIN LISPRO 5 UNITS: 100 INJECTION, SOLUTION INTRAVENOUS; SUBCUTANEOUS at 11:51

## 2018-11-19 RX ADMIN — IPRATROPIUM BROMIDE AND ALBUTEROL SULFATE 3 ML: .5; 3 SOLUTION RESPIRATORY (INHALATION) at 04:43

## 2018-11-19 NOTE — CDMP QUERY
\"Diastolic CHF\" has been documented in progress notes. Please clarify if this patient is being treated/managed for: 
 
=> Chronic Diastolic CHF 
=> Other Explanation of clinical findings 
=> Unable to Determine (no explanation of clinical findings) The medical record reflects the following: 
 
Risk: PMH CHF Clinical Indicators: 
- BNP 81 Treatment: 
- receiving Lisinopril Please clarify and document your clinical opinion in the progress notes and discharge summary including the definitive and/or presumptive diagnosis, (suspected or probable), related to the above clinical findings. Please include clinical findings supporting your diagnosis. If you DECLINE this query or would like to communicate with [employer], please utilize the \"[employer] message box\" at the TOP of the Progress Note on the right. Thank you, [id number], [place] [address]

## 2018-11-19 NOTE — CDMP QUERY
The diagnosis of asthma is unclear. Please specify: 
 
=> Asthma  Acuity ( Acute, Exacerbated, Status Asthmaticus, Chronic)  Severity (mild, moderate, severe)  Intermittent or Persistent 
=> Other Explanation of clinical findings 
=> Unable to Determine (no explanation of clinical findings) The medical record reflects the following: 
 
Risk:  
- PMH asthma, COPD 
- admitted with COPD exacerbation Clinical Indicators: 
- ED:  
Respiratory: Positive for chest tightness and shortness of breath. Pulmonary/Chest: She has decreased breath sounds. She has wheezes. She exhibits no tenderness. Retracting   
 
- H&P: 
RESP: Disfficult for patient to speak full sentences. Unlabored breathing. Lungs clear to auscultation. no wheeze, rales, or rhonchi. Equal expansion bilaterally RESP:  CTAB, no wheeze, rales, or rhonchi. Labored breathing, tachypnea Treatment: receiving Albuterol nebs, IV Solumedrol Please clarify and document your clinical opinion in the progress notes and discharge summary including the definitive and/or presumptive diagnosis, (suspected or probable), related to the above clinical findings. Please include clinical findings supporting your diagnosis. If you DECLINE this query or would like to communicate with [facility], please utilize the \"[employer] message box\" at the TOP of the Progress Note on the right. Thank you, [address], [place] [address]

## 2018-11-19 NOTE — CDMP QUERY
Documentation of Acute Respiratory Elsa Poke has been documented by pulmonary. Please document in the progress notes the clinical indicators that support this diagnosis or state that the diagnosis has been ruled out. Acute Respiratory Failure indicators include: - Respirations <12 or >25 - [employer] - Use of accessory muscles of respiration  
- Inability to speak in full sentences - Cyanosis AND 
 
- Pulse ox <90% RA or <95% on O2  
- pH <7.35 or >7.45  
- pO2 < 60 mm Hg (or 10mm below COPD patient's baseline) - pCO2 >50mm Hg (or 10mm above COPD patient's baseline) Please clarify and document your clinical opinion in the progress notes and discharge summary including the definitive and/or presumptive diagnosis, (suspected or probable), related to the above clinical findings. Please include clinical findings supporting your diagnosis. Thank you, [id number], [place] [address]

## 2018-11-19 NOTE — PROGRESS NOTES
Problem: Self Care Deficits Care Plan (Adult)  Goal: *Acute Goals and Plan of Care (Insert Text)  Outcome: Resolved/Met Date Met: 11/19/18  Occupational Therapy EVALUATION/discharge    Patient: Jared Gordillo (39 y.o. female)  Date: 11/19/2018  Primary Diagnosis: COPD with acute exacerbation ([facility] [address]  COPD exacerbation ([facility] [address]       Precautions: 2 L O2       ASSESSMENT AND RECOMMENDATIONS:  Based on the objective data described below, the patient presents with decreased activity tolerance secondary to SOB and decreased safety with mobility reporting several falls in the home. Pt demonstrates good sitting balance and intact UB strength and ROM. Pt able to doff/jayjay socks with extra time. Pt performed sit>stand from bed without AD and SUP. Pt performed ambulation to bathroom without AD and SUP. Pt was not visibly SOB during short walk, however, reports more fatiguing than when she is home at her baseline. Pt educated on wearing supportive shoes when at home to reduce falls and avoid slippers without firm sole and heel. Pt educated on tub transfer bench due to having tub/shower at home and no GB. Pt reports she has fallen twice in the shower. Pt stated she will look into purchasing a tub transfer bench. Pt educated on the recommendation for [facility] OT to continue working on activity tolerance in the home and safety with transfer into/out of bathtub. Skilled occupational therapy is not indicated at this time. Discharge Recommendations: Home Health  Further Equipment Recommendations for Discharge: transfer bench      Barriers to Learning/Limitations: None  Compensate with: visual, verbal, tactile, kinesthetic cues/model     COMPLEXITY     Eval Complexity: History: LOW Complexity : Brief history review ; Examination: LOW Complexity : 1-3 performance deficits relating to physical, cognitive , or psychosocial skils that result in activity limitations and / or participation restrictions ;  Decision Making:MEDIUM Complexity : Patient may present with comorbidities that affect occupational performnce. Miniml to moderate modification of tasks or assistance (eg, physical or verbal ) with assesment(s) is necessary to enable patient to complete evaluation  Assessment: low Complexity        G-CODES:     Self Care  Current  CI= 1-19%   Goal  CI= 1-19%   D/C  CI= 1-19%. The severity rating is based on the Level of Assistance required for Functional Mobility and ADLs. SUBJECTIVE:   Patient stated I have lots of chair in my house. \"    OBJECTIVE DATA SUMMARY:     Past Medical History:   Diagnosis Date    Asthma     Chronic lung disease     COPD     Cystocele, midline     Diabetes mellitus ([address]     GERD (gastroesophageal reflux disease)     Hidradenitis suppurativa     Hyperlipidemia     Hypertension     SHERRIE on CPAP     CPAP    Stress incontinence      Past Surgical History:   Procedure Laterality Date    BREAST SURGERY PROCEDURE UNLISTED      Right breast benign tumor removal    HX APPENDECTOMY      HX CHOLECYSTECTOMY      HX DILATION AND CURETTAGE      Dysfunctional uterine bleeding, thought 2/2 fibroids    HX TUBAL LIGATION       Prior Level of Function/Home Situation: Pt was MI in self care, and ambulation with RW. Pt reports looking into securing home aide for assistance when she returns home.    Home Situation  Home Environment: Private residence  # Steps to Enter: 1  One/Two Story Residence: Two story, live on 1st floor(bedroom on 1st and bath on 2nd)  Living Alone: No  Support Systems: Family member(s)  Patient Expects to be Discharged to[de-identified] Private residence  Current DME Used/Available at Home: Shower chair, Commode, bedside, Walker, rolling, Oxygen, portable  Tub or Shower Type: Tub/Shower combination  [x]     Right hand dominant   []     Left hand dominant  Cognitive/Behavioral Status:  Neurologic State: Alert  Orientation Level: Oriented X4  Cognition: Appropriate decision making; Appropriate for age attention/concentration; Follows commands  Safety/Judgement: Fall prevention;Home safety  Skin: intact BUEs  Edema: none noted BUEs  Coordination:  Coordination: Within functional limits(BUEs)  Fine Motor Skills-Upper: Left Intact; Right Intact    Gross Motor Skills-Upper: Left Intact; Right Intact  Balance:  Sitting: Intact  Standing: Impaired; Without support  Standing - Static: Good  Standing - Dynamic : Fair  Strength:  Strength: Within functional limits(BUEs)   Tone & Sensation:  Tone: Normal(BUEs)  Sensation: Impaired(neuropathy ocassionally)   Range of Motion:  AROM: Within functional limits(BUEs)   Functional Mobility and Transfers for ADLs:  Bed Mobility:   Scooting: Independent  Transfers:  Sit to Stand: Modified independent    Bathroom Mobility: Supervision/set up    ADL Assessment:  Feeding: Independent  Oral Facial Hygiene/Grooming: Independent  Bathing: Supervision  Upper Body Dressing: Modified independent  Lower Body Dressing: Modified independent  Toileting: Supervision   ADL Intervention:   Cognitive Retraining  Safety/Judgement: Fall prevention;Home safety  Pain:  Pt reports 0/10 pain or discomfort prior to treatment.    Pt reports 0/10 pain or discomfort post treatment. Activity Tolerance:   fair    Please refer to the flowsheet for vital signs taken during this treatment. After treatment:   []  Patient left in no apparent distress sitting up in chair  [x]  Patient left in no apparent distress in bed  [x]  Call bell left within reach  [x]  Nursing notified  []  Caregiver present  []  Bed alarm activated    COMMUNICATION/EDUCATION:   Communication/Collaboration:  [x]      Home safety education was provided and the patient/caregiver indicated understanding. [x]      Patient/family have participated as able and agree with findings and recommendations. []      Patient is unable to participate in plan of care at this time.     Horace Stone OT  Time Calculation: 23 mins

## 2018-11-19 NOTE — PROGRESS NOTES
Reason for Admission:   COPD exacerbation               RRAT Score:     33             Resources/supports as identified by patient/family:   None reported                Top Challenges facing patient (as identified by patient/family and CM): Finances/Medication cost?     No problems reported. Transportation? Family provides transport to appointments              Support system or lack thereof? Daughter only. Pt states that she Xochilt Solomon had a UAI done in the hospital during an admission in March of 2018, but she did not know that it was processed and she could have been receiving personal care aid services. Pt states that [employer] was due to come to her residence to evaluate her in two weeks. She is concerened that it is becoming difficult to perform ADL's independently and her decline in health over the past several months  is becoming a strain on her daughter                     Living arrangements? Lives with daughter and her two young children           Self-care/ADLs/Cognition? assisted          Current Advanced Directive/Advance Care Plan:  Not on file                          Plan for utilizing home health: To be determined                      Likelihood of readmission: high                 Transition of Care Plan:    Demographics confirmed. Pt lives in a two story home and expresses difficulty going up the stairs to shower. Pt has 02 in the home and utilizes 2L NC as needed. DME in the home include a Cpap machine, nebulizer walker, cane toilet and shower chair. Plan is to return home. [place] [facility] signed for   home health patient has requested an UAI be completed on her behalf and submitted to [facility]. Care Management Interventions  PCP Verified by CM:  Yes  Last Visit to PCP: 11/14/18  Mode of Transport at Discharge: BLS  Transition of Care Consult (CM Consult): Discharge Planning  Physical Therapy Consult: No  Occupational Therapy Consult: No  Speech Therapy Consult: No  Current Support Network: Lives with Caregiver, Relative's Home  Confirm Follow Up Transport: Family  Plan discussed with Pt/Family/Caregiver: Yes  Freedom of Choice Offered: Yes  Discharge Location  Discharge Placement: Home with home health     Angelica White RN, BSN  [phone]

## 2018-11-19 NOTE — PROGRESS NOTES
conducted an initial consultation and Spiritual Assessment for Romina Marlow, who is a 60 y.o.,female. Patients Primary Language is: [place]. According to the patients EMR Baptist Affiliation is: Emma Mccain.     The reason the Patient came to the hospital is:   Patient Active Problem List    Diagnosis Date Noted    Acute exacerbation of chronic obstructive pulmonary disease (COPD) ([facility] [address] 10/07/2018    Bilateral carotid bruits 07/30/2018    Palpitations 07/30/2018    Type 2 diabetes with nephropathy ([address] 05/30/2018    Hyperlipidemia 01/24/2018    COPD with acute bronchitis ([address] 47/66/6656    Diastolic CHF, chronic ([address] 02/09/2017    Diverticulosis 02/09/2017    COPD exacerbation ([address] 02/08/2017    Acute exacerbation of COPD with asthma ([facility] [address] 02/08/2017    Obesity, Class III, BMI 40-49.9 (morbid obesity) ([facility] [address] 08/09/2016    Chest pain 08/09/2016    Dyspnea 08/09/2016    COPD with acute exacerbation ([address] 05/24/2015    COPD (chronic obstructive pulmonary disease) (HCC) 03/27/2015    Allergic rhinitis 03/27/2015    Essential hypertension, benign 09/30/2012    Diabetes mellitus, type 2 ([address] 09/30/2012    Esophageal reflux 09/30/2012    SHERRIE on CPAP         The  provided the following Interventions:  Initiated a relationship of care and support. Explored issues of scout, belief, spirituality and Religion/ritual needs while hospitalized. Listened empathically. Provided chaplaincy education. Provided information about Spiritual Care Services. Offered prayer and assurance of continued prayers on patient's behalf. Chart reviewed. The following outcomes where achieved:  Patient shared limited information about both their medical narrative and spiritual journey/beliefs.  confirmed Patient's Baptist Affiliation. Patient processed feeling about current hospitalization. Patient expressed gratitude for 's visit.     Assessment:  Patient does not have any Gnosticism/cultural needs that will affect patients preferences in health care. There are no spiritual or Gnosticism issues which require intervention at this time. Plan:  Chaplains will continue to follow and will provide pastoral care on an as needed/requested basis.  recommends bedside caregivers page  on duty if patient shows signs of acute spiritual or emotional distress.     [address]   [phone]

## 2018-11-19 NOTE — PROGRESS NOTES
Problem: Mobility Impaired (Adult and Pediatric)  Goal: *Acute Goals and Plan of Care (Insert Text)  Physical Therapy Goals  Initiated 11/19/2018 and to be accomplished within 7 day(s)  1. Patient will move from supine to sit and sit to supine , scoot up and down and roll side to side in bed with modified independence. 2.  Patient will perform sit to stand with modified independence. 4.  Patient will ambulate with independence/modified independence for >100 feet with the least restrictive device. 5.  Patient will ascend/descend 2 stairs with 1 handrail(s) with supervision/set-up. physical Therapy EVALUATION    Patient: Hillary Tariq (12 y.o. female)  Date: 11/19/2018  Primary Diagnosis: COPD with acute exacerbation (HCC)  COPD exacerbation ([facility] [address]       Precautions: Fall   ASSESSMENT :  Patient is 64yo F admitted to hospital for COPD exacerbation and presents today alert and agreeable to therapy, sitting EOB with NC O2 donned at 2L. Patient requested to use bathroom and stood to ambulated into bathroom where she transferred on/off commode and then transferred back to sitting EOB. Patient performed objective assessment and stood to ambulated 85ft at slow pace; took standing rest break after 40ft then returned to sitting in a locked recliner. Patient required apprx 2mins recovery and SaO2 at 96% though is audibly SOB with increased accessory muscle use. Patient then agreeable to additional ambulation of 45ft and returned to sitting in recliner at conclusion of session. Patient educated on energy conservation and reports she utilizes these strategies at home. Also educated on role and goals of therapy and on recommendation for [place] PT upon D/C. Oriented to call bell, encouraged to ambulate with staff assist including to bathroom, and educated not to get up without assist.   Patient will benefit from skilled intervention to address the above impairments.   Patients rehabilitation potential is considered to be Good  Factors which may influence rehabilitation potential include:   []         None noted  []         Mental ability/status  [x]         Medical condition  [x]         Home/family situation and support systems  [x]         Safety awareness  []         Pain tolerance/management  []         Other:      PLAN :  Recommendations and Planned Interventions:  [x]           Bed Mobility Training             [x]    Neuromuscular Re-Education  [x]           Transfer Training                   []    Orthotic/Prosthetic Training  [x]           Gait Training                          []    Modalities  [x]           Therapeutic Exercises          []    Edema Management/Control  [x]           Therapeutic Activities            [x]    Patient and Family Training/Education  []           Other (comment):    Frequency/Duration: Patient will be followed by physical therapy 1-2 times per day/4-7 days per week to address goals. Discharge Recommendations: Home Health  Further Equipment Recommendations for Discharge: N/A     G-CODES     Mobility  Current  CI= 1-19%   Goal  CI= 1-19%. The severity rating is based on the Level of Assistance required for Functional Mobility and ADLs.        G-CODES     Eval Complexity: History: MEDIUM  Complexity : 1-2 comorbidities / personal factors will impact the outcome/ POC Exam:LOW Complexity : 1-2 Standardized tests and measures addressing body structure, function, activity limitation and / or participation in recreation  Presentation: LOW Complexity : Stable, uncomplicated  Clinical Decision Making:Low Complexity   Overall Complexity:LOW     SUBJECTIVE:   Patient stated I can get a sentence of two out today before I need to take a breath, so that's better than yesterday.     OBJECTIVE DATA SUMMARY:     Past Medical History:   Diagnosis Date    Asthma     Chronic lung disease     COPD     Cystocele, midline     Diabetes mellitus ([facility] [address]     GERD (gastroesophageal reflux disease)  Hidradenitis suppurativa     Hyperlipidemia     Hypertension     SHERRIE on CPAP     CPAP    Stress incontinence      Past Surgical History:   Procedure Laterality Date    BREAST SURGERY PROCEDURE UNLISTED      Right breast benign tumor removal    HX APPENDECTOMY      HX CHOLECYSTECTOMY      HX DILATION AND CURETTAGE      Dysfunctional uterine bleeding, thought 2/2 fibroids    HX TUBAL LIGATION       Barriers to Learning/Limitations: None  Compensate with: N/A  Prior Level of Function/Home Situation: Patient lives in 2 story home with bedroom on 1st floor and bathroom on 2nd floor. Patient has BSC and walker at home. Patient lives with her daughter and reports that she has chairs set around her home for energy conservation. Home Situation  Home Environment: Private residence  # Steps to Enter: 1  One/Two Story Residence: Two story  Living Alone: No  Support Systems: Family member(s), Child(kim)  Patient Expects to be Discharged to[de-identified] Private residence  Current DME Used/Available at Home: CPAP, Commode, bedside, Walker, Nebulizer, Oxygen, portable  Critical Behavior:    A&Ox4  Strength:    Strength: Within functional limits(BLE)   Tone & Sensation:   Tone: Normal(BLE)   Sensation: Intact(BLE)    Range Of Motion:  AROM: Within functional limits(BLE)   Functional Mobility:  Bed Mobility:   Scooting: Independent  Transfers:  Sit to Stand: Modified independent  Stand to Sit: Modified independent  Balance:   Sitting: Intact  Standing: Intact  Ambulation/Gait Training:  Distance (ft): (85ft, seated rest break, 45ft)   Ambulation - Level of Assistance: Supervision    Base of Support: Widened  Speed/Pam: Slow  Interventions: Verbal cues     Pain:  Pt reports 0/10 pain or discomfort prior to treatment.    Pt reports 0/10 pain or discomfort post treatment. Activity Tolerance:   Patient demos fair tolerance to activity with dyspnea on exertion despite SaO2 AT 96% on 2L NC.    Please refer to the flowsheet for vital signs taken during this treatment. After treatment:   [x]         Patient left in no apparent distress sitting up in chair  []         Patient left in no apparent distress in bed  [x]         Call bell left within reach  []         Nursing notified  []         Caregiver present  []         Bed alarm activated  []         SCDs in place to B LE     COMMUNICATION/EDUCATION:   [x]         Fall prevention education was provided and the patient/caregiver indicated understanding. [x]         Patient/family have participated as able in goal setting and plan of care. [x]         Patient/family agree to work toward stated goals and plan of care. []         Patient understands intent and goals of therapy, but is neutral about his/her participation. []         Patient is unable to participate in goal setting and plan of care.     Thank you for this referral.  Colette Ayala, PT   Time Calculation: 23 mins

## 2018-11-19 NOTE — PROGRESS NOTES
Intern Progress Note  [facility]       Patient: Sylvia Gomez MRN: [id number]  CSN: [id number]    YOB: 1960  Age: 60 y.o. Sex: female    DOA: 11/16/2018 LOS:  LOS: 3 days                    Subjective:     Acute events: Patient has continued SOB on 2L home O2. She walked around the unit and was more SOB than before. She does not think she is back to her baseline. She also complains of inability to cough up mucus in chest. She refused Bipap overnight. Review of Systems   Constitutional: Negative for chills and fever. HENT: Positive for congestion. Eyes: Negative for blurred vision. Respiratory: Positive for cough, sputum production and shortness of breath. Negative for wheezing. Cardiovascular: Negative for chest pain and leg swelling. Gastrointestinal: Negative for abdominal pain, heartburn, nausea and vomiting. Neurological: Negative for dizziness and headaches. All other systems reviewed and are negative. Objective:      Patient Vitals for the past 24 hrs:   Temp Pulse Resp BP SpO2   11/19/18 0742 97.3 °F (36.3 °C) 74 20 143/81 94 %   11/19/18 0444 -- -- -- -- 98 %   11/19/18 0434 98.2 °F (36.8 °C) 71 18 130/65 96 %   11/19/18 0103 -- -- -- -- 97 %   11/19/18 0000 98 °F (36.7 °C) 78 19 146/81 96 %   11/18/18 2012 97.6 °F (36.4 °C) 79 18 108/66 93 %   11/18/18 1926 -- -- -- -- 96 %   11/18/18 1556 -- -- -- -- 98 %   11/18/18 1200 96.7 °F (35.9 °C) 80 19 120/64 98 %   11/18/18 1116 -- -- -- -- 98 %   11/18/18 1006 -- -- -- -- 97 %         Intake/Output Summary (Last 24 hours) at 11/19/2018 0755  Last data filed at 11/18/2018 1556  Gross per 24 hour   Intake 720 ml   Output --   Net 720 ml       Physical Exam:   General:  Alert and Responsive and in No acute distress. HEENT: Conjunctiva pink, sclera anicteric. EOMI. Pharynx moist, nonerythematous. Moist mucous membranes. Thyroid not enlarged, no nodules.   No cervical, supraclavicular, occipital or submandibular lymphadenopathy. No other gross abnormalities present. CV:  RRR, no murmurs. No visible pulsations or thrills. RESP:  Unlabored breathing. Lungs clear to auscultation. no wheeze, rales, or rhonchi. Equal expansion bilaterally. On 2L humidified NC. ABD:  Soft, nontender, nondistended. No hepatosplenomegaly. No suprapubic tenderness. No CVA tenderness. MS:  No joint deformity or instability. No atrophy. Neuro:  5/5 strength bilateral upper extremities and lower extremities. A+Ox3. Ext:  No edema. 2+ radial and dp pulses bilaterally. Skin:  No rashes, lesions, or ulcers. Good turgor    Lab/Data Reviewed:  Recent Results (from the past 12 hour(s))   GLUCOSE, POC    Collection Time: 11/18/18 11:27 PM   Result Value Ref Range    Glucose (POC) 420 (HH) 70 - 110 mg/dL   CBC WITH AUTOMATED DIFF    Collection Time: 11/19/18  2:44 AM   Result Value Ref Range    WBC 11.9 4.6 - 13.2 K/uL    RBC 4.04 (L) 4.20 - 5.30 M/uL    HGB 12.2 12.0 - 16.0 g/dL    HCT 36.5 35.0 - 45.0 %    MCV 90.3 74.0 - 97.0 FL    MCH 30.2 24.0 - 34.0 PG    MCHC 33.4 31.0 - 37.0 g/dL    RDW 13.8 11.6 - 14.5 %    PLATELET 012 741 - 803 K/uL    MPV 9.5 9.2 - 11.8 FL    NEUTROPHILS 93 (H) 40 - 73 %    LYMPHOCYTES 5 (L) 21 - 52 %    MONOCYTES 2 (L) 3 - 10 %    EOSINOPHILS 0 0 - 5 %    BASOPHILS 0 0 - 2 %    ABS. NEUTROPHILS 11.1 (H) 1.8 - 8.0 K/UL    ABS. LYMPHOCYTES 0.6 (L) 0.9 - 3.6 K/UL    ABS. MONOCYTES 0.2 0.05 - 1.2 K/UL    ABS. EOSINOPHILS 0.0 0.0 - 0.4 K/UL    ABS.  BASOPHILS 0.0 0.0 - 0.1 K/UL    DF AUTOMATED     METABOLIC PANEL, BASIC    Collection Time: 11/19/18  2:44 AM   Result Value Ref Range    Sodium 136 136 - 145 mmol/L    Potassium 4.8 3.5 - 5.5 mmol/L    Chloride 100 100 - 108 mmol/L    CO2 27 21 - 32 mmol/L    Anion gap 9 3.0 - 18 mmol/L    Glucose 357 (H) 74 - 99 mg/dL    BUN 22 (H) 7.0 - 18 MG/DL    Creatinine 1.18 0.6 - 1.3 MG/DL    BUN/Creatinine ratio 19 12 - 20      GFR est AA 57 (L) >60 ml/min/1.73m2    GFR est non-AA 47 (L) >60 ml/min/1.73m2    Calcium 9.2 8.5 - 10.1 MG/DL   GLUCOSE, POC    Collection Time: 11/19/18  7:35 AM   Result Value Ref Range    Glucose (POC) 319 (H) 70 - 110 mg/dL       Scheduled Medications Reviewed:  Current Facility-Administered Medications   Medication Dose Route Frequency    insulin lispro (HUMALOG) injection 5 Units  5 Units SubCUTAneous TIDAC    insulin lispro (HUMALOG) injection   SubCUTAneous AC&HS    albuterol-ipratropium (DUO-NEB) 2.5 MG-0.5 MG/3 ML  3 mL Nebulization Q4H RT    famotidine (PEPCID) tablet 20 mg  20 mg Oral BID    loratadine (CLARITIN) tablet 10 mg  10 mg Oral DAILY    enoxaparin (LOVENOX) injection 40 mg  40 mg SubCUTAneous Q24H    aspirin delayed-release tablet 81 mg  81 mg Oral DAILY    fluticasone (FLONASE) 50 mcg/actuation nasal spray 2 Spray  2 Spray Both Nostrils DAILY    insulin glargine (LANTUS) injection 30 Units  30 Units SubCUTAneous QHS    lisinopril (PRINIVIL, ZESTRIL) tablet 20 mg  20 mg Oral BID    montelukast (SINGULAIR) tablet 10 mg  10 mg Oral QHS    methylPREDNISolone (PF) (SOLU-MEDROL) injection 60 mg  60 mg IntraVENous Q6H    levoFLOXacin (LEVAQUIN) 750 mg in D5W IVPB  750 mg IntraVENous Q24H       Imaging, microbiology, and EKG/Telemetry:  No results found.     Assessment/Plan   60 y.o. female with PMH COPD on home O2, HTN, DM, Diastolic CHF, SHERRIE , now admitted with COPD Exacerbation.     COPD Exacerbation: Patient with history of tobacco use, COPD and Asthma. Patient seen for SOB a least 5 x this past year. Per GOLD criteria, patient on additional antibiotic for pseudomonal coverage.   - Continue Duonebs q2h per RT  - Continue Methylprednisolone 60 mg q6h  - D/c Azithromycin 250 mg PO per pulmonology  - Continue Levaquin 750 mg qd, patient allergic to cephalosporins  - Continue Singluar 10 mg QD  - Hold Spiriva and Advair until patient improves  - Hold cetirizine 10 mg QD  - Continue flonase  - BiPAP at night  - Follow-up sputum culture and gram stain  - Add Mucinex     Transaminitis: Resolved AST 68>74, ALT 83>36  Possibly due to recent addition of azithromycin to medications. Repeat CMP showed improved AST and ALT, possible transient increase in liver enzymes.  -Continue to monitor     Diastolic CHF: Patient troponin wnl and BNP 81. ECHO 7/2018 EF 66-70% with no wma. LVH. - Continue ASA 81 mg qd  - Continue lisinopril 20 mg BID     HTN: Stable. On admission /89 that decreased to 152/73 before transfer to the floor. Currently 120/64.  - Continue lisinopril 20 mg BID     T2DM: 10/2018 HgbA1c 7.7. Patient had POC glucose of 344 and she was given lantus 30 units and 8 units of lispro. He Glucose an hour later was 439. Patient's next Glucose  and patient given 8 units of lispro. 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  Patient followed by [facility] Dr Vic Chawla. Next appointment in Wednesday and had a CT Scan yesterday.      Diet: Diabetic  DVT Prophylaxis:lovenox  Code Status: Full  Point of Contact: Caryn (daughter) [phone]     Disposition and anticipated LOS: Discharged planned for tomorrow      Pallavi Ortiz MD   [facility] PGY-1  11/19/18 7:55 AM

## 2018-11-19 NOTE — PROGRESS NOTES
Progress Note  Pulmonary, Critical Care, and Sleep Medicine    Name: Blank Marshall MRN: [id number]   : 1960 Hospital: [facility]   Date: 2018        IMPRESSION:   · Acute on chronic hypoxic respiratory failure  · COPD exacerbation  · COPD GOLD D with significant Asthma component, steroid dependent  · HTN  · GERD  · SHERRIE mild AHI 12 on CPAP  · Obesity       PLAN:   · Will use BIPAP HS for now. Pt followed by Pulmonary and Sleep MD and is scheduled for titration soon. Would defer decision on device/mode then  · Taper steroids today  · Bronchial hygiene and aspiration precautions  · Continue empiric antibiotics  · Pt without perceived benefit from Azithromycin for suppression, may consider Daliresp in the future  · Mobilize pt  · Assess home O2 needs prior to discharge  · Strict glycemic control. Anticipate better control with steroid taper     Subjective/Interval History:   I have reviewed the flowsheet and previous days notes. Reviewed interval history. Discussed management with nursing staff. Pt up in bed, without new respiratory complaints. No chest pain      ROS:Pertinent items are noted in HPI. Orders reviewed including medications. Changes made if indicated. Telemetry monitor reviewed at the bedside.   Objective:   Vital Signs:    Visit Vitals  /72 (BP 1 Location: Left arm, BP Patient Position: At rest)   Pulse 79   Temp 97.2 °F (36.2 °C)   Resp 18   Ht 5' 3\" (1.6 m)   Wt 115.2 kg (254 lb)   SpO2 95%   BMI 44.99 kg/m²       O2 Device: Nasal airway   O2 Flow Rate (L/min): 2 l/min   Temp (24hrs), Av.7 °F (36.5 °C), Min:97.2 °F (36.2 °C), Max:98.2 °F (36.8 °C)       Intake/Output:   Last shift:      701 - 1900  In: 888 [P.O.:720]  Out: -   Last 3 shifts: 1901 - 700  In: 941 [P.O.:941]  Out: -     Intake/Output Summary (Last 24 hours) at 2018 1438  Last data filed at 2018 1256  Gross per 24 hour   Intake 960 ml   Output -- Net 960 ml        Physical Exam:    General:  Alert, cooperative, in no distress, appears stated age. Head:  Normocephalic, without obvious abnormality, atraumatic. Eyes:  ANicteric, PERRL,   Nose: Nares normal.  Mucosa normal. No drainage or sinus tenderness. Throat: Lips, mucosa, and tongue normal.   NO Thrush   Neck: Supple, symmetrical, trachea midline, no adenopathy, thyroid: no enlargment/tenderness/nodules, no carotid bruit and no JVD. Back:   Symmetric    Lungs:   Bilateral auscultation poor air entry, no rales or wheezes   Chest wall:  No tenderness or deformity. NO intercostal retractions   Heart:  Regular rate and rhythm, S1, S2 normal, no murmur, click, rub or gallop. Abdomen:   Soft, non-tender. Bowel sounds normal. No masses,  No organomegaly. NO paradoxical motion   Extremities: normal, atraumatic, no cyanosis or edema. Pulses: 2+ and symmetric all extremities. Skin: Skin color, texture, turgor normal. No rashes or lesions. NO clubbing   Lymph nodes: Cervical nodes normal.   Neurologic: Grossly nonfocal      :        Devices:             Drips:    DATA:  Labs:  Recent Labs     11/19/18  0244 11/18/18  0238 11/17/18  0109   WBC 11.9 13.4* 8.9   HGB 12.2 12.1 12.7   HCT 36.5 35.8 37.5    279 295     Recent Labs     11/19/18  0244 11/18/18  0238 11/17/18  0109 11/16/18  1712    137 134* 138   K 4.8 4.5 4.2 3.5    102 101 103   CO2 27 26 25 27   * 318* 439* 241*   BUN 22* 19* 15 15   CREA 1.18 1.05 1.17 1.02   CA 9.2 9.4 8.6 8.9   MG  --   --   --  1.8   ALB  --   --  3.3* 3.6   SGOT  --   --  36 68*   ALT  --   --  74* 83*     No results for input(s): PH, PCO2, PO2, HCO3, FIO2 in the last 72 hours.     Imaging:  []        I have personally reviewed the patients radiographs and reports  []         []        No change from prior, tubes and lines in adequate position  []        Improved   []        Worsening  High complexity decision making was performed during this consultation and evaluation.       Jessica Guerrero MD

## 2018-11-20 ENCOUNTER — HOME HEALTH ADMISSION (OUTPATIENT)
Dept: HOME HEALTH SERVICES | Facility: HOME HEALTH | Age: 58
End: 2018-11-20
Payer: MEDICARE

## 2018-11-20 VITALS
BODY MASS INDEX: 45.04 KG/M2 | HEIGHT: 63 IN | DIASTOLIC BLOOD PRESSURE: 81 MMHG | TEMPERATURE: 97.7 F | RESPIRATION RATE: 18 BRPM | OXYGEN SATURATION: 97 % | HEART RATE: 69 BPM | WEIGHT: 254.2 LBS | SYSTOLIC BLOOD PRESSURE: 151 MMHG

## 2018-11-20 LAB
ANION GAP SERPL CALC-SCNC: 11 MMOL/L (ref 3–18)
BASOPHILS # BLD: 0 K/UL (ref 0–0.1)
BASOPHILS NFR BLD: 0 % (ref 0–2)
BUN SERPL-MCNC: 23 MG/DL (ref 7–18)
BUN/CREAT SERPL: 25 (ref 12–20)
CALCIUM SERPL-MCNC: 9 MG/DL (ref 8.5–10.1)
CHLORIDE SERPL-SCNC: 102 MMOL/L (ref 100–108)
CO2 SERPL-SCNC: 25 MMOL/L (ref 21–32)
CREAT SERPL-MCNC: 0.91 MG/DL (ref 0.6–1.3)
DIFFERENTIAL METHOD BLD: ABNORMAL
EOSINOPHIL # BLD: 0 K/UL (ref 0–0.4)
EOSINOPHIL NFR BLD: 0 % (ref 0–5)
ERYTHROCYTE [DISTWIDTH] IN BLOOD BY AUTOMATED COUNT: 13.4 % (ref 11.6–14.5)
GLUCOSE BLD STRIP.AUTO-MCNC: 308 MG/DL (ref 70–110)
GLUCOSE BLD STRIP.AUTO-MCNC: 320 MG/DL (ref 70–110)
GLUCOSE BLD STRIP.AUTO-MCNC: 324 MG/DL (ref 70–110)
GLUCOSE SERPL-MCNC: 301 MG/DL (ref 74–99)
HCT VFR BLD AUTO: 36.5 % (ref 35–45)
HGB BLD-MCNC: 12.5 G/DL (ref 12–16)
LYMPHOCYTES # BLD: 0.6 K/UL (ref 0.9–3.6)
LYMPHOCYTES NFR BLD: 6 % (ref 21–52)
MCH RBC QN AUTO: 30.5 PG (ref 24–34)
MCHC RBC AUTO-ENTMCNC: 34.2 G/DL (ref 31–37)
MCV RBC AUTO: 89 FL (ref 74–97)
MONOCYTES # BLD: 0.6 K/UL (ref 0.05–1.2)
MONOCYTES NFR BLD: 5 % (ref 3–10)
NEUTS SEG # BLD: 9.9 K/UL (ref 1.8–8)
NEUTS SEG NFR BLD: 89 % (ref 40–73)
PLATELET # BLD AUTO: 279 K/UL (ref 135–420)
PMV BLD AUTO: 9.2 FL (ref 9.2–11.8)
POTASSIUM SERPL-SCNC: 4.3 MMOL/L (ref 3.5–5.5)
RBC # BLD AUTO: 4.1 M/UL (ref 4.2–5.3)
SODIUM SERPL-SCNC: 138 MMOL/L (ref 136–145)
WBC # BLD AUTO: 11.1 K/UL (ref 4.6–13.2)

## 2018-11-20 PROCEDURE — 80048 BASIC METABOLIC PNL TOTAL CA: CPT

## 2018-11-20 PROCEDURE — 94640 AIRWAY INHALATION TREATMENT: CPT

## 2018-11-20 PROCEDURE — 74011250637 HC RX REV CODE- 250/637: Performed by: STUDENT IN AN ORGANIZED HEALTH CARE EDUCATION/TRAINING PROGRAM

## 2018-11-20 PROCEDURE — 74011250637 HC RX REV CODE- 250/637: Performed by: INTERNAL MEDICINE

## 2018-11-20 PROCEDURE — 36415 COLL VENOUS BLD VENIPUNCTURE: CPT

## 2018-11-20 PROCEDURE — 74011636637 HC RX REV CODE- 636/637: Performed by: STUDENT IN AN ORGANIZED HEALTH CARE EDUCATION/TRAINING PROGRAM

## 2018-11-20 PROCEDURE — 74011000250 HC RX REV CODE- 250: Performed by: STUDENT IN AN ORGANIZED HEALTH CARE EDUCATION/TRAINING PROGRAM

## 2018-11-20 PROCEDURE — 82962 GLUCOSE BLOOD TEST: CPT

## 2018-11-20 PROCEDURE — 85025 COMPLETE CBC W/AUTO DIFF WBC: CPT

## 2018-11-20 PROCEDURE — 97112 NEUROMUSCULAR REEDUCATION: CPT

## 2018-11-20 PROCEDURE — 74011250636 HC RX REV CODE- 250/636: Performed by: STUDENT IN AN ORGANIZED HEALTH CARE EDUCATION/TRAINING PROGRAM

## 2018-11-20 PROCEDURE — 94660 CPAP INITIATION&MGMT: CPT

## 2018-11-20 PROCEDURE — 74011636637 HC RX REV CODE- 636/637: Performed by: FAMILY MEDICINE

## 2018-11-20 PROCEDURE — 97530 THERAPEUTIC ACTIVITIES: CPT

## 2018-11-20 PROCEDURE — 97116 GAIT TRAINING THERAPY: CPT

## 2018-11-20 RX ORDER — IPRATROPIUM BROMIDE AND ALBUTEROL SULFATE 2.5; .5 MG/3ML; MG/3ML
3 SOLUTION RESPIRATORY (INHALATION)
Qty: 30 NEBULE | Refills: 0 | Status: ON HOLD | OUTPATIENT
Start: 2018-11-20 | End: 2019-06-29

## 2018-11-20 RX ORDER — LEVOFLOXACIN 750 MG/1
750 TABLET ORAL EVERY 24 HOURS
Status: DISCONTINUED | OUTPATIENT
Start: 2018-11-20 | End: 2018-11-20 | Stop reason: HOSPADM

## 2018-11-20 RX ORDER — PREDNISONE 10 MG/1
10 TABLET ORAL
Qty: 30 TAB | Refills: 0 | Status: SHIPPED | OUTPATIENT
Start: 2018-11-20 | End: 2019-01-07 | Stop reason: ALTCHOICE

## 2018-11-20 RX ORDER — IPRATROPIUM BROMIDE AND ALBUTEROL SULFATE 2.5; .5 MG/3ML; MG/3ML
3 SOLUTION RESPIRATORY (INHALATION)
Status: DISCONTINUED | OUTPATIENT
Start: 2018-11-20 | End: 2018-11-20 | Stop reason: HOSPADM

## 2018-11-20 RX ORDER — INSULIN GLARGINE 100 [IU]/ML
35 INJECTION, SOLUTION SUBCUTANEOUS
Qty: 28 UNITS | Refills: 0 | Status: ON HOLD | OUTPATIENT
Start: 2018-11-20 | End: 2020-04-02 | Stop reason: SDUPTHER

## 2018-11-20 RX ADMIN — ASPIRIN 81 MG: 81 TABLET, COATED ORAL at 08:06

## 2018-11-20 RX ADMIN — INSULIN LISPRO 10 UNITS: 100 INJECTION, SOLUTION INTRAVENOUS; SUBCUTANEOUS at 16:34

## 2018-11-20 RX ADMIN — IPRATROPIUM BROMIDE AND ALBUTEROL SULFATE 3 ML: .5; 3 SOLUTION RESPIRATORY (INHALATION) at 13:42

## 2018-11-20 RX ADMIN — METHYLPREDNISOLONE SODIUM SUCCINATE 60 MG: 40 INJECTION, POWDER, FOR SOLUTION INTRAMUSCULAR; INTRAVENOUS at 05:31

## 2018-11-20 RX ADMIN — METHYLPREDNISOLONE SODIUM SUCCINATE 60 MG: 40 INJECTION, POWDER, FOR SOLUTION INTRAMUSCULAR; INTRAVENOUS at 00:00

## 2018-11-20 RX ADMIN — IPRATROPIUM BROMIDE AND ALBUTEROL SULFATE 3 ML: .5; 3 SOLUTION RESPIRATORY (INHALATION) at 03:44

## 2018-11-20 RX ADMIN — LISINOPRIL 20 MG: 20 TABLET ORAL at 08:06

## 2018-11-20 RX ADMIN — INSULIN LISPRO 10 UNITS: 100 INJECTION, SOLUTION INTRAVENOUS; SUBCUTANEOUS at 11:57

## 2018-11-20 RX ADMIN — INSULIN LISPRO 12 UNITS: 100 INJECTION, SOLUTION INTRAVENOUS; SUBCUTANEOUS at 16:32

## 2018-11-20 RX ADMIN — METHYLPREDNISOLONE SODIUM SUCCINATE 60 MG: 40 INJECTION, POWDER, FOR SOLUTION INTRAMUSCULAR; INTRAVENOUS at 11:58

## 2018-11-20 RX ADMIN — INSULIN LISPRO 10 UNITS: 100 INJECTION, SOLUTION INTRAVENOUS; SUBCUTANEOUS at 08:12

## 2018-11-20 RX ADMIN — INSULIN LISPRO 12 UNITS: 100 INJECTION, SOLUTION INTRAVENOUS; SUBCUTANEOUS at 08:11

## 2018-11-20 RX ADMIN — FLUTICASONE PROPIONATE 2 SPRAY: 50 SPRAY, METERED NASAL at 08:07

## 2018-11-20 RX ADMIN — IPRATROPIUM BROMIDE AND ALBUTEROL SULFATE 3 ML: .5; 3 SOLUTION RESPIRATORY (INHALATION) at 09:10

## 2018-11-20 RX ADMIN — GUAIFENESIN 600 MG: 600 TABLET, EXTENDED RELEASE ORAL at 08:05

## 2018-11-20 RX ADMIN — IPRATROPIUM BROMIDE AND ALBUTEROL SULFATE 3 ML: .5; 3 SOLUTION RESPIRATORY (INHALATION) at 00:01

## 2018-11-20 RX ADMIN — FAMOTIDINE 20 MG: 20 TABLET ORAL at 08:06

## 2018-11-20 RX ADMIN — INSULIN LISPRO 12 UNITS: 100 INJECTION, SOLUTION INTRAVENOUS; SUBCUTANEOUS at 11:57

## 2018-11-20 NOTE — PROGRESS NOTES
physical Therapy TREATMENT Patient: Keshawn Abbott (84 y.o. female) Date: 11/20/2018 Diagnosis: COPD with acute exacerbation ([address] COPD exacerbation ([facility] [address] COPD with acute exacerbation ([facility] [address] Precautions:    
Chart, physical therapy assessment, plan of care and goals were reviewed. OBJECTIVE/ASSESSMENT: 
Pt seen bedside 2S, presents supine on 3L O2 via NC. Cleared by nsg for PT treatment. Pt demonstrates modified independence with bed mobility and tx, and SBA for ambulation 3 x 100 ft without AD, on 3L O2 via portable O2 tank. Pt requires 3-4 minute seated rest breaks between walks, however, O2 sats remain 95-97% t/o session, and HR remains in 60's - 70's t/o session. Pt positioned up in b/s chair post treatment with call bell, phone and tray in reach - RN notified of performance and positioning. Education: Pt educated on gait safety as well as importance of frequent rest breaks secondary to c/o SOB/fatigue. Progression toward goals: 
[x]      Improving appropriately and progressing toward goals 
[]      Improving slowly and progressing toward goals 
[]      Not making progress toward goals and plan of care will be adjusted PLAN: 
Patient continues to benefit from skilled intervention to address the above impairments. Continue treatment per established plan of care. Discharge Recommendations:  None anticipated from PT standpoint. Pt planning for Pulmonary Rehab upon d/c. Will have good family support. Further Equipment Recommendations for Discharge:  N/A  
 
SUBJECTIVE:  
Patient stated I still get a bit winded, but I feel much better than yesterday.  OBJECTIVE DATA SUMMARY:  
Critical Behavior: 
Neurologic State: Alert Orientation Level: Oriented X4 Cognition: Appropriate decision making, Appropriate for age attention/concentration, Appropriate safety awareness, Follows commands Safety/Judgement: Fall prevention, Home safety Functional Mobility Training: Bed Mobility: 
Rolling: Modified independent Supine to Sit: Modified independent Scooting: Modified independent Level of Assistance: Modified independent Interventions: Verbal cues Transfers: 
Sit to Stand: Modified independent Stand to Sit: Modified independent Balance: 
Sitting: Intact Standing: Impaired; Without support Standing - Static: Good Standing - Dynamic : Fair Ambulation/Gait Training: 
Distance (ft): 100 Feet (ft)(x 3 trials with 3-4 minute rest break in between. VSS t/o. ) Assistive Device: Other (comment)(none) Ambulation - Level of Assistance: Stand-by assistance Stairs: TBA. Pt does not wish to attempt stairs today. States she thinks she will be OK with them at home, and will try them tomorrow if able. Pain: 
Pre session: 0 Post session: 0 Activity Tolerance: Pt with c/o mild fatigue/SOB with mobility, however, VSS stable t/o session as detailed above. Please refer to the flowsheet for vital signs taken during this treatment. After treatment:  
[x] Patient left in no apparent distress sitting up in chair 
[] Patient left in no apparent distress in bed 
[x] Call bell left within reach [x] Nursing notified 
[] Caregiver present 
[] Bed alarm activated Melvin Hodgkin, PT Time Calculation: 39 mins Mobility [id number] Current  CI= 1-19%   Goal  CI= 1-19%. The severity rating is based on the Level of Assistance required for Functional Mobility and ADLs.

## 2018-11-20 NOTE — PROGRESS NOTES
Intern Progress Note  [facility]       Patient: Shine Duran MRN: [id number]  CSN: [id number]    YOB: 1960  Age: 60 y.o. Sex: female    DOA: 11/16/2018 LOS:  LOS: 4 days                    Subjective:     Acute events: Patient has improved overnight and states that her SOB is better. She feels better after Mucinex, but does not feel she is back to her baseline. Review of Systems   Constitutional: Negative for chills and fever. HENT: Positive for congestion. Eyes: Negative for blurred vision. Respiratory: Positive for cough, sputum production and shortness of breath. Negative for wheezing. Cardiovascular: Negative for chest pain and leg swelling. Gastrointestinal: Negative for abdominal pain, heartburn, nausea and vomiting. Neurological: Negative for dizziness and headaches. All other systems reviewed and are negative. Objective:      Patient Vitals for the past 24 hrs:   Temp Pulse Resp BP SpO2   11/20/18 0809 97.4 °F (36.3 °C) 61 18 161/62 99 %   11/20/18 0323 97.9 °F (36.6 °C) 63 18 171/81 98 %   11/19/18 2313 97.8 °F (36.6 °C) 68 18 120/69 94 %   11/19/18 1914 97.8 °F (36.6 °C) 74 18 148/74 96 %   11/19/18 1522 97.9 °F (36.6 °C) 72 18 146/75 97 %   11/19/18 1120 97.2 °F (36.2 °C) 79 18 118/72 95 %         Intake/Output Summary (Last 24 hours) at 11/20/2018 0940  Last data filed at 11/20/2018 9369  Gross per 24 hour   Intake 1160 ml   Output 500 ml   Net 660 ml       Physical Exam:   General:  Alert and Responsive and in No acute distress. HEENT: Conjunctiva pink, sclera anicteric. EOMI. Pharynx moist, nonerythematous. Moist mucous membranes. Thyroid not enlarged, no nodules. No cervical, supraclavicular, occipital or submandibular lymphadenopathy. No other gross abnormalities present. CV:  RRR, no murmurs. No visible pulsations or thrills. RESP:  Unlabored breathing. Lungs clear to auscultation. no wheeze, rales, or rhonchi. Equal expansion bilaterally. On 3L humidified NC. ABD:  Soft, nontender, nondistended. No hepatosplenomegaly. No suprapubic tenderness. No CVA tenderness. MS:  No joint deformity or instability. No atrophy. Neuro:  5/5 strength bilateral upper extremities and lower extremities. A+Ox3. Ext:  No edema. 2+ radial and dp pulses bilaterally. Skin:  No rashes, lesions, or ulcers. Good turgor    Lab/Data Reviewed:  Recent Results (from the past 12 hour(s))   CBC WITH AUTOMATED DIFF    Collection Time: 11/20/18  2:16 AM   Result Value Ref Range    WBC 11.1 4.6 - 13.2 K/uL    RBC 4.10 (L) 4.20 - 5.30 M/uL    HGB 12.5 12.0 - 16.0 g/dL    HCT 36.5 35.0 - 45.0 %    MCV 89.0 74.0 - 97.0 FL    MCH 30.5 24.0 - 34.0 PG    MCHC 34.2 31.0 - 37.0 g/dL    RDW 13.4 11.6 - 14.5 %    PLATELET 907 347 - 038 K/uL    MPV 9.2 9.2 - 11.8 FL    NEUTROPHILS 89 (H) 40 - 73 %    LYMPHOCYTES 6 (L) 21 - 52 %    MONOCYTES 5 3 - 10 %    EOSINOPHILS 0 0 - 5 %    BASOPHILS 0 0 - 2 %    ABS. NEUTROPHILS 9.9 (H) 1.8 - 8.0 K/UL    ABS. LYMPHOCYTES 0.6 (L) 0.9 - 3.6 K/UL    ABS. MONOCYTES 0.6 0.05 - 1.2 K/UL    ABS. EOSINOPHILS 0.0 0.0 - 0.4 K/UL    ABS.  BASOPHILS 0.0 0.0 - 0.1 K/UL    DF AUTOMATED     METABOLIC PANEL, BASIC    Collection Time: 11/20/18  2:16 AM   Result Value Ref Range    Sodium 138 136 - 145 mmol/L    Potassium 4.3 3.5 - 5.5 mmol/L    Chloride 102 100 - 108 mmol/L    CO2 25 21 - 32 mmol/L    Anion gap 11 3.0 - 18 mmol/L    Glucose 301 (H) 74 - 99 mg/dL    BUN 23 (H) 7.0 - 18 MG/DL    Creatinine 0.91 0.6 - 1.3 MG/DL    BUN/Creatinine ratio 25 (H) 12 - 20      GFR est AA >60 >60 ml/min/1.73m2    GFR est non-AA >60 >60 ml/min/1.73m2    Calcium 9.0 8.5 - 10.1 MG/DL   GLUCOSE, POC    Collection Time: 11/20/18  6:09 AM   Result Value Ref Range    Glucose (POC) 320 (H) 70 - 110 mg/dL       Scheduled Medications Reviewed:  Current Facility-Administered Medications   Medication Dose Route Frequency    insulin lispro (HUMALOG) injection 10 Units  10 Units SubCUTAneous TIDAC    guaiFENesin ER (MUCINEX) tablet 600 mg  600 mg Oral Q12H    insulin lispro (HUMALOG) injection   SubCUTAneous AC&HS    albuterol-ipratropium (DUO-NEB) 2.5 MG-0.5 MG/3 ML  3 mL Nebulization Q4H RT    famotidine (PEPCID) tablet 20 mg  20 mg Oral BID    loratadine (CLARITIN) tablet 10 mg  10 mg Oral DAILY    enoxaparin (LOVENOX) injection 40 mg  40 mg SubCUTAneous Q24H    aspirin delayed-release tablet 81 mg  81 mg Oral DAILY    fluticasone (FLONASE) 50 mcg/actuation nasal spray 2 Spray  2 Spray Both Nostrils DAILY    insulin glargine (LANTUS) injection 30 Units  30 Units SubCUTAneous QHS    lisinopril (PRINIVIL, ZESTRIL) tablet 20 mg  20 mg Oral BID    montelukast (SINGULAIR) tablet 10 mg  10 mg Oral QHS    methylPREDNISolone (PF) (SOLU-MEDROL) injection 60 mg  60 mg IntraVENous Q6H    levoFLOXacin (LEVAQUIN) 750 mg in D5W IVPB  750 mg IntraVENous Q24H       Imaging, microbiology, and EKG/Telemetry:  No results found. Assessment/Plan   60 y.o. female with PMH COPD on home O2, HTN, DM, Diastolic CHF, SHERRIE , now admitted with COPD Exacerbation.     COPD Exacerbation: Patient with history of tobacco use, COPD and Asthma. Patient seen for SOB a least 5 x this past year. Per GOLD criteria, patient on additional antibiotic for pseudomonal coverage.   - Continue Duonebs q2h per RT  - Switch Methylprednisolone 60 mg q6h to Prednisone 60mg  - Decreased O2 from 3L to 2L  - Continue Levaquin 750 mg qd, patient allergic to cephalosporins  - Continue Singluar 10 mg QD  - Hold Spiriva and Advair until patient improves  - Hold cetirizine 10 mg QD  - Continue flonase  - BiPAP at night  - Follow-up sputum culture and gram stain  - Continue Mucinex     Transaminitis: Resolved AST 68>74, ALT 83>36  Possibly due to recent addition of azithromycin to medications.  Repeat CMP showed improved AST and ALT, possible transient increase in liver enzymes.  -Continue to monitor     Diastolic CHF: Patient troponin wnl and BNP 81. ECHO 7/2018 EF 66-70% with no wma. LVH. - Continue ASA 81 mg qd  - Continue lisinopril 20 mg BID     HTN: Stable. On admission /89 that decreased to 152/73 before transfer to the floor. Currently 120/64.  - Continue lisinopril 20 mg BID     T2DM: 10/2018 HgbA1c 7.7. Patient had POC glucose of 344 and she was given lantus 30 units and 8 units of lispro. He Glucose an hour later was 439. Patient's next Glucose  and patient given 8 units of lispro. Improved, but continued elevation given solumedrol administration.   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  Patient followed by [facility]NT Dr Meri Shaw. Next appointment in Wednesday and had a CT Scan yesterday.      Diet: Diabetic  DVT Prophylaxis:lovenox  Code Status: Full  Point of Contact: Caryn (daughter) [phone]     Disposition and anticipated LOS: Discharged planned for tomorrow      Carlos Morelos MD   [address] Medicine PGY-1  11/20/18 7:55 AM

## 2018-11-20 NOTE — HOME CARE
Rec HC Order - d/c noted for today [facility] will follow for SN/PT/OT/Aide / telehealth - BRENDA Boo RN

## 2018-11-20 NOTE — PROGRESS NOTES
Problem: Mobility Impaired (Adult and Pediatric)  Goal: *Acute Goals and Plan of Care (Insert Text)  Physical Therapy Goals  Initiated 11/19/2018 and to be accomplished within 7 day(s)  1. Patient will move from supine to sit and sit to supine , scoot up and down and roll side to side in bed with modified independence. 2.  Patient will perform sit to stand with modified independence. 4.  Patient will ambulate with independence/modified independence for >100 feet with the least restrictive device. 5.  Patient will ascend/descend 2 stairs with 1 handrail(s) with supervision/set-up. Outcome: Progressing Towards Goal  PHYSICAL THERAPY TREATMENT     Patient: Toshia Bobby (89 y.o. female)  Date: 11/20/2018  Diagnosis: COPD with acute exacerbation (HCC)  COPD exacerbation ([facility] [address] COPD with acute exacerbation (HCC)      Precautions:     Chart, physical therapy assessment, plan of care and goals were reviewed.     OBJECTIVE/ASSESSMENT:  Pt seen bedside 2S, presents supine on 3L O2 via NC. Cleared by nsg for PT treatment. Pt demonstrates modified independence with bed mobility and tx, and SBA for ambulation 3 x 100 ft without AD, on 3L O2 via portable O2 tank. Pt requires 3-4 minute seated rest breaks between walks, however, O2 sats remain 95-97% t/o session, and HR remains in 60's - 70's t/o session. Pt positioned up in b/s chair post treatment with call bell, phone and tray in reach - RN notified of performance and positioning.       Education: Pt educated on gait safety as well as importance of frequent rest breaks secondary to c/o SOB/fatigue. Progression toward goals:  [x]      Improving appropriately and progressing toward goals  []      Improving slowly and progressing toward goals  []      Not making progress toward goals and plan of care will be adjusted      PLAN:  Patient continues to benefit from skilled intervention to address the above impairments.   Continue treatment per established plan of care. Discharge Recommendations:  None anticipated from PT standpoint. Pt planning for Pulmonary Rehab upon d/c. Will have good family support. Further Equipment Recommendations for Discharge:  N/A      SUBJECTIVE:   Patient stated I still get a bit winded, but I feel much better than yesterday.      OBJECTIVE DATA SUMMARY:   Critical Behavior:  Neurologic State: Alert  Orientation Level: Oriented X4  Cognition: Appropriate decision making, Appropriate for age attention/concentration, Appropriate safety awareness, Follows commands  Safety/Judgement: Fall prevention, Home safety  Functional Mobility Training:  Bed Mobility:  Rolling: Modified independent  Supine to Sit: Modified independent  Scooting: Modified independent  Level of Assistance: Modified independent              Interventions: Verbal cues  Transfers:  Sit to Stand: Modified independent  Stand to Sit: Modified independent              Balance:  Sitting: Intact  Standing: Impaired; Without support  Standing - Static: Good  Standing - Dynamic : Fair  Ambulation/Gait Training:  Distance (ft): 100 Feet (ft)(x 3 trials with 3-4 minute rest break in between. VSS t/o. )  Assistive Device: Other (comment)(none)  Ambulation - Level of Assistance: Stand-by assistance  Stairs: TBA. Pt does not wish to attempt stairs today. States she thinks she will be OK with them at home, and will try them tomorrow if able. Pain:  Pre session: 0  Post session: 0  Activity Tolerance:   Pt with c/o mild fatigue/SOB with mobility, however, VSS stable t/o session as detailed above. Please refer to the flowsheet for vital signs taken during this treatment.   After treatment:   [x] Patient left in no apparent distress sitting up in chair  [] Patient left in no apparent distress in bed  [x] Call bell left within reach  [x] Nursing notified  [] Caregiver present  [] Bed alarm activated      Bess Pantoja, PT   Time Calculation: 39 mins     Mobility  Current  CI= 1-19%   Goal  CI= 1-19%. The severity rating is based on the Level of Assistance required for Functional Mobility and ADLs.

## 2018-11-20 NOTE — PROGRESS NOTES
Pt to be discharged today. Referral placed and BS  home health liaison Perri Gutiérrez notified of discharge. No additional needs identified.      Franky Whiting RN, BSN  [phone]

## 2018-11-20 NOTE — ROUTINE PROCESS
1900-Bedside shift change report given to Wilbert Perez RN (oncoming nurse) by Esther Herndon RN (offgoing nurse). Report included the following information SBAR, Kardex and MAR. Patient awake and alert, Patient sitting up on side of bed. Sob with exertion. 2100-Patient awake watching tv. No distress noted,    2300-Patient went on Bipap. No distress noted. 0700-Bedside shift change report given to JOSÉ MIGUEL Chan (oncoming nurse) by Wilbert Perez RN (offgoing nurse). Report included the following information SBAR, Kardex and MAR. Patient alert and oriented x4. No distress noted. Night was uneventful, Patient on bipap throughout the night. SOB while ambulating.

## 2018-11-20 NOTE — PROGRESS NOTES
conducted a Follow up consultation and Spiritual Assessment for Jenny Landeros, who is a 60 y.o.,female. The  provided the following Interventions:  Continued the relationship of care and support. Listened empathically. Offered prayer and assurance of continued prayer on patients behalf. Chart reviewed. The following outcomes were achieved:  Patient expressed gratitude for 's visit. Assessment:  There are no further spiritual or Zoroastrianism issues which require Spiritual Care Services interventions at this time. Plan:  Chaplains will continue to follow and will provide pastoral care on an as needed/requested basis.  recommends bedside caregivers page  on duty if patient shows signs of acute spiritual or emotional distress.        [id number] [facility]   [phone]

## 2018-11-20 NOTE — PROGRESS NOTES
Progress Note  Pulmonary, Critical Care, and Sleep Medicine    Name: Blank Marshall MRN: [id number]   : 1960 Hospital: [address] [place] [address] Dr   Date: 2018        IMPRESSION:   · Acute on chronic hypoxic respiratory failure, much improved  · COPD exacerbation resolved  · COPD GOLD D with significant Asthma component, steroid dependent, on LTOT  · HTN stable  · GERD  · SHERRIE mild AHI 12 on CPAP  · Obesity       PLAN:   · Switch to po steroid taper prior to discharge  · Pt followed by Pulmonary and Sleep MD and is scheduled for titration soon. Would defer decision on device/mode then  · Taper steroids today  · Bronchial hygiene and aspiration precautions  · Continue empiric antibiotics  · Pt without perceived benefit from Azithromycin for suppression, may consider Daliresp in the future  · Mobilize pt  · RA O2 saturation 96%, will resume O2 with activity and HS with CPAP  · Strict glycemic control. Anticipate better control with steroid taper  · Discussed with PRM resident     Subjective/Interval History:   I have reviewed the flowsheet and previous days notes. Reviewed interval history. Discussed management with nursing staff. Pt up in bed, without new respiratory complaints. No chest pain  RA saturation 96% at rest.      ROS:Pertinent items are noted in HPI. Orders reviewed including medications. Changes made if indicated. Telemetry monitor reviewed at the bedside. Objective:   Vital Signs:    Visit Vitals  /70 (BP 1 Location: Left arm, BP Patient Position: Sitting)   Pulse 66   Temp 97.8 °F (36.6 °C)   Resp 18   Ht 5' 3\" (1.6 m)   Wt 115.3 kg (254 lb 3.2 oz)   SpO2 94%   BMI 45.03 kg/m²       O2 Device: Nasal cannula   O2 Flow Rate (L/min): 2 l/min   Temp (24hrs), Av.7 °F (36.5 °C), Min:97.4 °F (36.3 °C), Max:97.9 °F (36.6 °C)       Intake/Output:   Last shift:      No intake/output data recorded. Last 3 shifts:  1901 -  0700  In: 1520 [P.O.:1370;  I.V.:150]  Out: 500 [Urine:500]    Intake/Output Summary (Last 24 hours) at 11/20/2018 1617  Last data filed at 11/20/2018 2126  Gross per 24 hour   Intake 800 ml   Output 500 ml   Net 300 ml        Physical Exam:    General:  Alert, cooperative, in no distress, appears stated age. Head:  Normocephalic, without obvious abnormality, atraumatic. Eyes:  ANicteric, PERRL,   Nose: Nares normal.  Mucosa normal. No drainage or sinus tenderness. Throat: Lips, mucosa, and tongue normal.   NO Thrush   Neck: Supple, symmetrical, trachea midline, no adenopathy, thyroid: no enlargment/tenderness/nodules, no carotid bruit and no JVD. Back:   Symmetric    Lungs:   Bilateral auscultation poor to fair air entry, no rales or wheezes   Chest wall:  No tenderness or deformity. NO intercostal retractions   Heart:  Regular rate and rhythm, S1, S2 normal, no murmur, click, rub or gallop. Abdomen:   Soft, non-tender. Bowel sounds normal. No masses,  No organomegaly. NO paradoxical motion   Extremities: normal, atraumatic, no cyanosis or edema. Pulses: 2+ and symmetric all extremities. Skin: Skin color, texture, turgor normal. No rashes or lesions. NO clubbing   Lymph nodes: Cervical nodes normal.   Neurologic: Grossly nonfocal      :        Devices:             Drips:    DATA:  Labs:  Recent Labs     11/20/18  0216 11/19/18  0244 11/18/18  0238   WBC 11.1 11.9 13.4*   HGB 12.5 12.2 12.1   HCT 36.5 36.5 35.8    303 279     Recent Labs     11/20/18  0216 11/19/18  0244 11/18/18  0238    136 137   K 4.3 4.8 4.5    100 102   CO2 25 27 26   * 357* 318*   BUN 23* 22* 19*   CREA 0.91 1.18 1.05   CA 9.0 9.2 9.4     No results for input(s): PH, PCO2, PO2, HCO3, FIO2 in the last 72 hours.     Imaging:  []        I have personally reviewed the patients radiographs and reports  []         []        No change from prior, tubes and lines in adequate position  []        Improved   []        Worsening  High complexity decision making was performed during this consultation and evaluation.       Zabrina Danielle MD

## 2018-11-20 NOTE — DISCHARGE INSTRUCTIONS
Patient armband removed and shredded  DISCHARGE SUMMARY from Nurse  As part of the discharge instructions, medications already given today were discussed with the patient. The next dose due of all ordered meds was highlighted as part of the medication discharge instructions. Discussed with the patient the importance of taking medications as directed, as well as the side effects and adverse reactions to medications ordered. PATIENT INSTRUCTIONS:      What to do at Home:  Recommended activity: Activity as tolerated,     If you experience any of the following symptoms shortness of breath/difficulty breathing. Weight gain of 2lbs or greater in a week. , please follow up with nearest emergency room or primary care physician. *  Please give a list of your current medications to your Primary Care Provider. *  Please update this list whenever your medications are discontinued, doses are      changed, or new medications (including over-the-counter products) are added. *  Please carry medication information at all times in case of emergency situations. These are general instructions for a healthy lifestyle:    No smoking/ No tobacco products/ Avoid exposure to second hand smoke  Surgeon General's Warning:  Quitting smoking now greatly reduces serious risk to your health. Obesity, smoking, and sedentary lifestyle greatly increases your risk for illness    A healthy diet, regular physical exercise & weight monitoring are important for maintaining a healthy lifestyle    You may be retaining fluid if you have a history of heart failure or if you experience any of the following symptoms:  Weight gain of 3 pounds or more overnight or 5 pounds in a week, increased swelling in our hands or feet or shortness of breath while lying flat in bed. Please call your doctor as soon as you notice any of these symptoms; do not wait until your next office visit.     Recognize signs and symptoms of STROKE:    F-face looks uneven    A-arms unable to move or move unevenly    S-speech slurred or non-existent    T-time-call 911 as soon as signs and symptoms begin-DO NOT go       Back to bed or wait to see if you get better-TIME IS BRAIN. Warning Signs of HEART ATTACK     Call 911 if you have these symptoms:   Chest discomfort. Most heart attacks involve discomfort in the center of the chest that lasts more than a few minutes, or that goes away and comes back. It can feel like uncomfortable pressure, squeezing, fullness, or pain.  Discomfort in other areas of the upper body. Symptoms can include pain or discomfort in one or both arms, the back, neck, jaw, or stomach.  Shortness of breath with or without chest discomfort.  Other signs may include breaking out in a cold sweat, nausea, or lightheadedness. Don't wait more than five minutes to call 911 - MINUTES MATTER! Fast action can save your life. Calling 911 is almost always the fastest way to get lifesaving treatment. Emergency Medical Services staff can begin treatment when they arrive -- up to an hour sooner than if someone gets to the hospital by car. The discharge information has been reviewed with the patient. The patient verbalized understanding. Discharge medications reviewed with the patient and appropriate educational materials and side effects teaching were provided. ___________________________________________________________________________________________________________________________________     Learning About COPD, Asthma, and Air Pollution  How does air pollution affect COPD and asthma? When you have COPD or asthma, air pollution may make your symptoms worse. If it does, it means that air pollution is a trigger for you. It is important to know what your triggers are and how to deal with them.  If air pollution is a trigger for you, you need to learn about air quality and pay attention to weather forecasts that include how bad the air is expected to be.  How can you manage a flare-up caused by air pollution? · Do not panic. Quick treatment at home may help you prevent serious breathing problems. · Take your medicines exactly as your doctor tells you.  ? Use your quick-relief inhaler as directed by your doctor. If your symptoms do not get better after you use your medicine, have someone take you to the emergency room. Call an ambulance if necessary. ? With inhaled medicines, a spacer or a nebulizer may help you get more medicine to your lungs. Ask your doctor or pharmacist how to use them properly. Practice using the spacer in front of a mirror before you have a flare-up. This may help you get the medicine into your lungs quickly. ? If your doctor has given you steroid pills, take them as directed. ? Talk to your doctor if you have any problems with your medicine. What can you do to prevent flare-ups? · Try not to be outside when air pollution levels are high. Stay at home with your windows closed. · Do not smoke. This is the most important step you can take to prevent more damage to your lungs and prevent problems. If you already smoke, it is never too late to stop. If you need help quitting, talk to your doctor about stop-smoking programs and medicines. These can increase your chances of quitting for good. · Avoid secondhand smoke; cold, dry air; and high altitudes. · Take your daily medicines as prescribed. · Avoid colds and flu. ? Get a pneumococcal vaccine. ? Get a flu vaccine each year, as soon as it is available. Ask those you live or work with to do the same, so they will not get the flu and infect you. ? Try to stay away from people with colds or the flu. ? Wash your hands often. Follow-up care is a key part of your treatment and safety. Be sure to make and go to all appointments, and call your doctor if you are having problems. It's also a good idea to know your test results and keep a list of the medicines you take.   Where can you learn more? Go to http://etelvina-olivier.info/. Enter  in the search box to learn more about \"Learning About COPD, Asthma, and Air Pollution. \"  Current as of: December 6, 2017  Content Version: 11.8  © 9729-0775 [employer]. Care instructions adapted under license by [employer] (which disclaims liability or warranty for this information). If you have questions about a medical condition or this instruction, always ask your healthcare professional. [address] any warranty or liability for your use of this information. Breathing Techniques for COPD: Care Instructions  Your Care Instructions    Breathing is hard when you have chronic obstructive pulmonary disease (COPD). You may take quick, short breaths. Breathing this way makes it harder to get air into your lungs. Learning new ways to control your breathing may help. You may feel better and be able to do more. You can try three basic ways to control your breathing. They are pursed-lip breathing, diaphragmatic breathing, and breathing while bending. Use these methods when you are more short of breath than normal. Practice them often so you can do them well. Follow-up care is a key part of your treatment and safety. Be sure to make and go to all appointments, and call your doctor if you are having problems. It's also a good idea to know your test results and keep a list of the medicines you take. How can you care for yourself at home? · Pursed-lip breathing helps you breathe more air out so that your next breath can be deeper. For this type of breathing, you breathe in through your nose and out through your mouth while almost closing your lips. Breathe in for about 2 seconds, and breathe out for 4 to 6 seconds. Pursed-lip breathing decreases shortness of breath and improves your ability to exercise.   · Diaphragmatic breathing helps your lungs expand so that they take in more air.  ? Lie on your back, or prop yourself up on several pillows. ? Put one hand on your belly and the other on your chest. When you breathe in, push your belly out as far as possible. You should feel the hand on your belly move out, while the hand on your chest does not move. ? When you breathe out, you should feel the hand on your belly move in. When you can do this type of breathing well while lying down, learn to do it while sitting or standing. Many people with COPD find this breathing method helpful. ? Practice diaphragmatic breathing for 20 minutes, 2 or 3 times a day. · Breathing while bending forward at the waist may make breathing easier. It can reduce shortness of breath while you exercise or rest. It helps the diaphragm move more easily. The diaphragm is a large muscle that separates your lungs from your belly. It helps draw air into your lungs as you breathe. · Do not smoke. Smoking makes COPD worse. If you need help quitting, talk to your doctor about stop-smoking programs and medicines. These can increase your chances of quitting for good. When should you call for help? Call your doctor now or seek immediate medical care if:    · Your breathing methods do not help.     · Your shortness of breath gets worse.     · You cough up blood.     · You have swelling in your belly and legs.     · You have severe chest pain.    Watch closely for changes in your health, and be sure to contact your doctor if you have any problems. Where can you learn more? Go to http://etelvina-olivier.info/. Enter O533 in the search box to learn more about \"Breathing Techniques for COPD: Care Instructions. \"  Current as of: December 6, 2017  Content Version: 11.8  © 5319-6454 [employer]. Care instructions adapted under license by [employer] (which disclaims liability or warranty for this information).  If you have questions about a medical condition or this instruction, always ask your healthcare professional. [address] any warranty or liability for your use of this information. Chronic Obstructive Pulmonary Disease (COPD) Flare-Ups: Care Instructions  Your Care Instructions    Chronic obstructive pulmonary disease (COPD) is a lung disease that makes it hard to breathe. It is caused by damage to the lungs over many years, usually from smoking. COPD is often a mix of two diseases:  · Chronic bronchitis: The airways that carry air to the lungs (bronchial tubes) get inflamed and make a lot of mucus. This can narrow or block the airways. · Emphysema: In a healthy person, the tiny air sacs in the lungs are like balloons. As you breathe in and out, they get bigger and smaller to move air through your lungs. But with emphysema, these air sacs are damaged and lose their stretch. Less air gets in and out of the lungs. Many people with COPD have attacks called flare-ups or exacerbations. This is when your usual symptoms quickly get worse and stay worse. The doctor has checked you carefully. But problems can develop later. If you notice any problems or new symptoms, get medical treatment right away. Follow-up care is a key part of your treatment and safety. Be sure to make and go to all appointments, and call your doctor if you are having problems. It's also a good idea to know your test results and keep a list of the medicines you take. How can you care for yourself at home? · Be safe with medicines. Take your medicines exactly as prescribed. Call your doctor if you think you are having a problem with your medicine. You may be taking medicines such as:  ? Bronchodilators. These help open your airways and make breathing easier. ? Corticosteroids. These reduce airway inflammation. They may be given as pills, in a vein, or in an inhaled form. You may go home with pills in addition to an inhaler that you already use.   · A spacer may help you get more inhaled medicine to your lungs. Ask your doctor or pharmacist if a spacer is right for you. If it is, ask how to use it properly. · If your doctor prescribed antibiotics, take them as directed. Do not stop taking them just because you feel better. You need to take the full course of antibiotics. · If your doctor prescribed oxygen, use the flow rate your doctor has recommended. Do not change it without talking to your doctor first.  · Do not smoke. Smoking makes COPD worse. If you need help quitting, talk to your doctor about stop-smoking programs and medicines. These can increase your chances of quitting for good. When should you call for help? Call 911 anytime you think you may need emergency care. For example, call if:    · You have severe trouble breathing.    Call your doctor now or seek immediate medical care if:    · You have new or worse trouble breathing.     · Your coughing or wheezing gets worse.     · You cough up dark brown or bloody mucus (sputum).     · You have a new or higher fever.    Watch closely for changes in your health, and be sure to contact your doctor if:    · You notice more mucus or a change in the color of your mucus.     · You need to use your antibiotic or steroid pills.     · You do not get better as expected. Where can you learn more? Go to http://etelvina-olivier.info/. Enter D207 in the search box to learn more about \"Chronic Obstructive Pulmonary Disease (COPD) Flare-Ups: Care Instructions. \"  Current as of: December 6, 2017  Content Version: 11.8  © 5302-0344 [employer]. Care instructions adapted under license by "[employer]" (which disclaims liability or warranty for this information). If you have questions about a medical condition or this instruction, always ask your healthcare professional. [place] [address] any warranty or liability for your use of this information.            Learning About COPD Triggers  What are triggers? When you have COPD (chronic obstructive pulmonary disease), certain things can make your symptoms worse. These are called triggers. They include:  · Cigarette smoke or air pollution. · Illnesses like colds, flu, or pneumonia. · Cleaning supplies or other chemicals. · Gases, particles, or fumes from wood or kerosene home heaters. Not all people have the same triggers. What may cause symptoms in one person may not be a problem for another person. How do triggers affect COPD? Triggers can make it harder for your lungs to work as they should and can lead to sudden difficulty breathing and other symptoms. When you are around a trigger, a COPD flare-up is more likely. If your symptoms are severe, you may need emergency treatment or have to go to the hospital for treatment. If you know what your triggers are and can avoid them, you can reduce how often you have flare-ups and how much COPD affects your life. It's also important to be active and to take your daily medicines as prescribed. This helps prevent flare-ups too. What can you do to avoid triggers? The first thing is to know your triggers. When you are having symptoms, note the things around you that might be causing them. Then look for patterns in what may be triggering your symptoms. When you have your list of possible triggers, work with your doctor to find ways to avoid them. Here are some ways to avoid a few common triggers. · Do not smoke or allow others to smoke around you. If you need help quitting, talk to your doctor about stop-smoking programs and medicines. These can increase your chances of quitting for good. · If there is a lot of pollution, pollen, or dust outside, stay at home and keep your windows closed. Use an air conditioner or air filter in your home. Check your local weather report or newspaper for air quality and pollen reports. · Get a flu vaccine every year. Talk to your doctor about getting a pneumococcal shot. Wash your hands often to prevent infections. How can you manage a flare-up? Do not panic if you start to have a COPD flare-up. · If you have a COPD action plan, follow the plan. In general:  ? Use your quick-relief inhaler as directed by your doctor. If your symptoms do not get better after you use your medicine, have someone take you to the emergency room. Call an ambulance if needed. ? Use a spacer with your metered-dose inhaler (MDI). If you have a nebulizer for inhaled medicine, use it. A spacer or nebulizer may help get more medicine to your lungs. ? If your doctor has given you other inhaled medicines or steroid pills, take them as directed. ? If your doctor has given you a prescription for an antibiotic, fill it if you need to.  ? Call your doctor if you have to use your antibiotic or steroid pills. Where can you learn more? Go to http://etelvina-olivier.info/. Enter A034 in the search box to learn more about \"Learning About COPD Triggers. \"  Current as of: December 6, 2017  Content Version: 11.8  © 1902-6090 [employer]. Care instructions adapted under license by [employer] (which disclaims liability or warranty for this information). If you have questions about a medical condition or this instruction, always ask your healthcare professional. [address] any warranty or liability for your use of this information.

## 2018-11-20 NOTE — PROGRESS NOTES
Processed UAI submitted to [facility] as requested by pt.      Linus Jackson RN, BSN  [phone]

## 2018-11-20 NOTE — DISCHARGE SUMMARY
Discharge Summary  [facility]      Patient: Mansoor Paulino Age: 60 y.o. Sex: female  : 1960    MRN: [id number]      DOA: 2018      Discharge Date: 2018      Gus Awad MD      PCP: Julia Decker MD        ================================================================    Reason for Admission:   COPD with acute exacerbation [facility])    Discharge Diagnoses:   COPD exacerbation  HTN  Type 2 Diabetes  Diastolic CHF  SHERRIE    Important notes to PCP/ follow-up studies and evaluations   1. Patient is on a 60mg Prednisone Taper  2. Prophylactic Azithromycin was stopped. 3. 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 4. Glargine was increased from 30U to 35U given Solumedrol use as inpatient and outpatient Prednisone. Pending labs and studies:  None. Operative Procedures:   None. Discharge Medications:     Current Discharge Medication List      START taking these medications    Details   predniSONE (DELTASONE) 10 mg tablet Take 10 mg by mouth TITRATE. Steroid Taper Instructions: Take 4 tablets for three days, next take 3 tablets for three days, then take 2 tablets for three days, and finally take 1 tablet for 3 days. Qty: 30 Tab, Refills: 0         CONTINUE these medications which have CHANGED    Details   insulin glargine (LANTUS,BASAGLAR) 100 unit/mL (3 mL) inpn 35 Units by SubCUTAneous route nightly. Qty: 28 Units, Refills: 0         CONTINUE these medications which have NOT CHANGED    Details   albuterol sulfate 90 mcg/actuation aepb Take 1 Puff by inhalation every four (4) hours.   Qty: 1 Inhaler, Refills: 0      NOVOLOG FLEXPEN U-100 INSULIN 100 unit/mL inpn Continue home Sliding scale insulin as prior to admission  Qty: 1 Pen, Refills: 0      fluticasone-salmeterol (ADVAIR DISKUS) 100-50 mcg/dose diskus inhaler Take 1 Puff by inhalation every twelve (12) hours.  Qty: 1 Inhaler, Refills: 0      albuterol (PROVENTIL VENTOLIN) 2.5 mg /3 mL (0.083 %) nebulizer solution 3 mL by Nebulization route every four (4) hours as needed for Wheezing. Indications: BRONCHOSPASM PREVENTION, Chronic Obstructive Pulmonary Disease  Qty: 30 Each, Refills: 0      omeprazole (PRILOSEC) 40 mg capsule Take 40 mg by mouth daily. Indications: gastroesophageal reflux disease      lisinopril (PRINIVIL, ZESTRIL) 40 mg tablet Take 20 mg by mouth two (2) times a day. Indications: hypertension      simethicone (MYLICON) 80 mg chewable tablet Take 80 mg by mouth every six (6) hours as needed for Flatulence. cpap machine kit by Does Not Apply route nightly. OXYGEN-AIR DELIVERY SYSTEMS 2 L by Nasal route continuous. [employer] Choice      aspirin delayed-release 81 mg tablet Take 81 mg by mouth daily. famotidine (PEPCID) 20 mg tablet Take 20 mg by mouth two (2) times daily as needed. albuterol (PROVENTIL HFA, VENTOLIN HFA, PROAIR HFA) 90 mcg/actuation inhaler Take 2 Puffs by inhalation every four (4) hours as needed for Wheezing. For the next 2 days after hospital discharge, use your inhaler 4 times a day. Qty: 1 Inhaler, Refills: 0      fluticasone (FLONASE) 50 mcg/actuation nasal spray 2 Sprays by Both Nostrils route daily. montelukast (SINGULAIR) 10 mg tablet Take 10 mg by mouth nightly. ipratropium (ATROVENT) 0.02 % soln 2.5 mL by Nebulization route four (4) times daily as needed. Indications: BRONCHOSPASM PREVENTION WITH COPD  Qty: 30 Vial, Refills: 0             Disposition: Home    Consultants:    Pulmonology- Dr. Eugenia Barrett (including pertinent history and physical findings)  Ms. Meron Irene presented to the ED with SOB that progressively got worse over a 2-week period. Associated symptoms included nasal congestion, headache, and a productive cough. This is a similar presentation as previous hospital admissions.  Patient was admitted to telemetry, administered Duonebs, Solumedrol, Levaquin, and supplemental oxygen. She was also offered BiPaP, which she used sometimes as she slept. On daily assessments, patient stated that she continued to improve closer to her baseline where she could walk the same distance without getting SOB. After evaluation by her primary team and pulmonology, the patient's SOB improved significantly and she felt ready to go home. She was able to breath comfortably on room air without supplemental oxygen.       Summarized key findings and results (labs, imaging studies, ECHO, cardiac cath, endoscopies, etc):    CBC w/Diff    Lab Results   Component Value Date/Time    WBC 11.1 11/20/2018 02:16 AM    HGB 12.5 11/20/2018 02:16 AM    HCT 36.5 11/20/2018 02:16 AM    PLATELET 213 50/04/1440 02:16 AM    MCV 89.0 11/20/2018 02:16 AM           Chemistry    Lab Results   Component Value Date/Time    Sodium 138 11/20/2018 02:16 AM    Potassium 4.3 11/20/2018 02:16 AM    Chloride 102 11/20/2018 02:16 AM    CO2 25 11/20/2018 02:16 AM    Anion gap 11 11/20/2018 02:16 AM    Glucose 301 (H) 11/20/2018 02:16 AM    BUN 23 (H) 11/20/2018 02:16 AM    Creatinine 0.91 11/20/2018 02:16 AM    BUN/Creatinine ratio 25 (H) 11/20/2018 02:16 AM    GFR est AA >60 11/20/2018 02:16 AM    GFR est non-AA >60 11/20/2018 02:16 AM    Calcium 9.0 11/20/2018 02:16 AM           Functional status and cognitive function:    Ambulates with: No Assistance  Status: alert, cooperative, no distress, appears stated age    Diet: General Diet    Code status and advanced care plan: [address] of Contact: Jerman Domingo (daughter) [phone]      Patient Education:  Patient was educated on the following topics prior to discharge: COPD Exacerbation    Follow-up:     Follow-up Information     Follow up With Specialties Details Why Contact Info    Rosina Villa MD [facility] Go on 11/26/2018 Follow up @2:00pm [address]  [facility] [address] [place] ISREAL Guallpa MD Pulmonary Disease, Internal Medicine Go on 11/21/2018 Hospital Follow-up @9:30am [address] [place] [address] [address] [address]  [phone]                 ================================================================    Amanda Pereira MD   [address] Medicine PGY-1  11/20/18 3:21 PM

## 2018-11-21 ENCOUNTER — HOME CARE VISIT (OUTPATIENT)
Dept: HOME HEALTH SERVICES | Facility: HOME HEALTH | Age: 58
End: 2018-11-21

## 2018-11-22 ENCOUNTER — HOME CARE VISIT (OUTPATIENT)
Dept: SCHEDULING | Facility: HOME HEALTH | Age: 58
End: 2018-11-22
Payer: MEDICARE

## 2018-11-22 VITALS
HEART RATE: 79 BPM | RESPIRATION RATE: 20 BRPM | OXYGEN SATURATION: 96 % | DIASTOLIC BLOOD PRESSURE: 60 MMHG | TEMPERATURE: 96 F | SYSTOLIC BLOOD PRESSURE: 118 MMHG

## 2018-11-22 PROCEDURE — 400013 HH SOC

## 2018-11-22 PROCEDURE — G0299 HHS/HOSPICE OF RN EA 15 MIN: HCPCS

## 2018-11-22 PROCEDURE — 3331090001 HH PPS REVENUE CREDIT

## 2018-11-22 PROCEDURE — 3331090002 HH PPS REVENUE DEBIT

## 2018-11-23 ENCOUNTER — HOME CARE VISIT (OUTPATIENT)
Dept: HOME HEALTH SERVICES | Facility: HOME HEALTH | Age: 58
End: 2018-11-23
Payer: MEDICARE

## 2018-11-23 PROCEDURE — 3331090001 HH PPS REVENUE CREDIT

## 2018-11-23 PROCEDURE — 3331090002 HH PPS REVENUE DEBIT

## 2018-11-24 ENCOUNTER — HOME CARE VISIT (OUTPATIENT)
Dept: SCHEDULING | Facility: HOME HEALTH | Age: 58
End: 2018-11-24
Payer: MEDICARE

## 2018-11-24 VITALS
HEART RATE: 76 BPM | DIASTOLIC BLOOD PRESSURE: 84 MMHG | SYSTOLIC BLOOD PRESSURE: 160 MMHG | TEMPERATURE: 97 F | OXYGEN SATURATION: 96 %

## 2018-11-24 PROCEDURE — 3331090002 HH PPS REVENUE DEBIT

## 2018-11-24 PROCEDURE — 3331090001 HH PPS REVENUE CREDIT

## 2018-11-24 PROCEDURE — G0299 HHS/HOSPICE OF RN EA 15 MIN: HCPCS

## 2018-11-25 ENCOUNTER — HOME CARE VISIT (OUTPATIENT)
Dept: HOME HEALTH SERVICES | Facility: HOME HEALTH | Age: 58
End: 2018-11-25
Payer: MEDICARE

## 2018-11-25 PROCEDURE — 3331090002 HH PPS REVENUE DEBIT

## 2018-11-25 PROCEDURE — 3331090001 HH PPS REVENUE CREDIT

## 2018-11-26 ENCOUNTER — HOME CARE VISIT (OUTPATIENT)
Dept: SCHEDULING | Facility: HOME HEALTH | Age: 58
End: 2018-11-26
Payer: MEDICARE

## 2018-11-26 VITALS
DIASTOLIC BLOOD PRESSURE: 90 MMHG | SYSTOLIC BLOOD PRESSURE: 156 MMHG | OXYGEN SATURATION: 95 % | HEART RATE: 84 BPM | TEMPERATURE: 97.4 F

## 2018-11-26 PROCEDURE — 3331090002 HH PPS REVENUE DEBIT

## 2018-11-26 PROCEDURE — G0151 HHCP-SERV OF PT,EA 15 MIN: HCPCS

## 2018-11-26 PROCEDURE — 3331090001 HH PPS REVENUE CREDIT

## 2018-11-26 NOTE — PROGRESS NOTES
CDMP Query: Ms. Magy Murillo is not acutely being treated for diastolic CHF. Leisa Cervantes.  Gita Bishop MD, PGY-1  [address] [facility]  11/26/18 1:41 PM

## 2018-11-26 NOTE — PROGRESS NOTES
CMDP  Query: Ms. Alejandra Hernandez has chronic, intermittent moderate asthma    Katherine Carvalho MD, PGY-1  [facility] [facility]  11/26/18 1:40 PM

## 2018-11-27 ENCOUNTER — HOME CARE VISIT (OUTPATIENT)
Dept: HOME HEALTH SERVICES | Facility: HOME HEALTH | Age: 58
End: 2018-11-27
Payer: MEDICARE

## 2018-11-27 ENCOUNTER — HOME CARE VISIT (OUTPATIENT)
Dept: SCHEDULING | Facility: HOME HEALTH | Age: 58
End: 2018-11-27
Payer: MEDICARE

## 2018-11-27 PROCEDURE — G0152 HHCP-SERV OF OT,EA 15 MIN: HCPCS

## 2018-11-27 PROCEDURE — G0299 HHS/HOSPICE OF RN EA 15 MIN: HCPCS

## 2018-11-27 PROCEDURE — 3331090002 HH PPS REVENUE DEBIT

## 2018-11-27 PROCEDURE — 3331090001 HH PPS REVENUE CREDIT

## 2018-11-28 PROCEDURE — 3331090001 HH PPS REVENUE CREDIT

## 2018-11-28 PROCEDURE — 3331090002 HH PPS REVENUE DEBIT

## 2018-11-29 ENCOUNTER — HOME CARE VISIT (OUTPATIENT)
Dept: SCHEDULING | Facility: HOME HEALTH | Age: 58
End: 2018-11-29
Payer: MEDICARE

## 2018-11-29 PROCEDURE — 3331090001 HH PPS REVENUE CREDIT

## 2018-11-29 PROCEDURE — G0157 HHC PT ASSISTANT EA 15: HCPCS

## 2018-11-29 PROCEDURE — 3331090002 HH PPS REVENUE DEBIT

## 2018-11-30 ENCOUNTER — HOME CARE VISIT (OUTPATIENT)
Dept: SCHEDULING | Facility: HOME HEALTH | Age: 58
End: 2018-11-30
Payer: MEDICARE

## 2018-11-30 VITALS
OXYGEN SATURATION: 95 % | DIASTOLIC BLOOD PRESSURE: 90 MMHG | HEART RATE: 77 BPM | SYSTOLIC BLOOD PRESSURE: 162 MMHG | RESPIRATION RATE: 20 BRPM | TEMPERATURE: 98.1 F

## 2018-11-30 PROCEDURE — 3331090001 HH PPS REVENUE CREDIT

## 2018-11-30 PROCEDURE — G0158 HHC OT ASSISTANT EA 15: HCPCS

## 2018-11-30 PROCEDURE — 3331090002 HH PPS REVENUE DEBIT

## 2018-12-01 ENCOUNTER — HOME CARE VISIT (OUTPATIENT)
Dept: SCHEDULING | Facility: HOME HEALTH | Age: 58
End: 2018-12-01
Payer: MEDICARE

## 2018-12-01 VITALS
TEMPERATURE: 97.3 F | HEART RATE: 82 BPM | OXYGEN SATURATION: 97 % | DIASTOLIC BLOOD PRESSURE: 84 MMHG | SYSTOLIC BLOOD PRESSURE: 158 MMHG

## 2018-12-01 PROCEDURE — 3331090001 HH PPS REVENUE CREDIT

## 2018-12-01 PROCEDURE — 3331090002 HH PPS REVENUE DEBIT

## 2018-12-02 VITALS
DIASTOLIC BLOOD PRESSURE: 100 MMHG | OXYGEN SATURATION: 99 % | RESPIRATION RATE: 16 BRPM | HEART RATE: 75 BPM | SYSTOLIC BLOOD PRESSURE: 179 MMHG | TEMPERATURE: 97 F

## 2018-12-02 PROCEDURE — 3331090001 HH PPS REVENUE CREDIT

## 2018-12-02 PROCEDURE — 3331090002 HH PPS REVENUE DEBIT

## 2018-12-03 ENCOUNTER — HOME CARE VISIT (OUTPATIENT)
Dept: SCHEDULING | Facility: HOME HEALTH | Age: 58
End: 2018-12-03
Payer: MEDICARE

## 2018-12-03 VITALS
HEART RATE: 84 BPM | DIASTOLIC BLOOD PRESSURE: 90 MMHG | TEMPERATURE: 97.3 F | OXYGEN SATURATION: 95 % | SYSTOLIC BLOOD PRESSURE: 174 MMHG | RESPIRATION RATE: 16 BRPM

## 2018-12-03 VITALS
OXYGEN SATURATION: 96 % | HEART RATE: 82 BPM | SYSTOLIC BLOOD PRESSURE: 160 MMHG | DIASTOLIC BLOOD PRESSURE: 80 MMHG | TEMPERATURE: 97.3 F

## 2018-12-03 PROCEDURE — G0157 HHC PT ASSISTANT EA 15: HCPCS

## 2018-12-03 PROCEDURE — G0158 HHC OT ASSISTANT EA 15: HCPCS

## 2018-12-03 PROCEDURE — 3331090002 HH PPS REVENUE DEBIT

## 2018-12-03 PROCEDURE — 3331090001 HH PPS REVENUE CREDIT

## 2018-12-04 ENCOUNTER — HOME CARE VISIT (OUTPATIENT)
Dept: SCHEDULING | Facility: HOME HEALTH | Age: 58
End: 2018-12-04
Payer: MEDICARE

## 2018-12-04 VITALS
OXYGEN SATURATION: 94 % | RESPIRATION RATE: 20 BRPM | HEART RATE: 76 BPM | SYSTOLIC BLOOD PRESSURE: 138 MMHG | DIASTOLIC BLOOD PRESSURE: 70 MMHG | TEMPERATURE: 96 F

## 2018-12-04 PROCEDURE — 3331090001 HH PPS REVENUE CREDIT

## 2018-12-04 PROCEDURE — 3331090002 HH PPS REVENUE DEBIT

## 2018-12-04 PROCEDURE — G0299 HHS/HOSPICE OF RN EA 15 MIN: HCPCS

## 2018-12-04 NOTE — PROGRESS NOTES
CDMP Query: Ms. Alejandra Hernandez presented with acute respiratory distress (not acute respiratory failure). Jason Orin.  Noe Carvalho MD, PGY-1  [address]  12/04/18 9:59 AM

## 2018-12-04 NOTE — PROGRESS NOTES
CDMP Query:  Ms. Pauline Silva is not acutely being treated for her chronic diastolic CHF. Kit Persaud.  Ayanna Christensen MD, PGY-1  [address] [facility]  12/04/18 9:56 AM

## 2018-12-05 ENCOUNTER — HOME CARE VISIT (OUTPATIENT)
Dept: SCHEDULING | Facility: HOME HEALTH | Age: 58
End: 2018-12-05
Payer: MEDICARE

## 2018-12-05 VITALS
OXYGEN SATURATION: 94 % | SYSTOLIC BLOOD PRESSURE: 128 MMHG | RESPIRATION RATE: 18 BRPM | HEART RATE: 74 BPM | DIASTOLIC BLOOD PRESSURE: 72 MMHG | TEMPERATURE: 98.1 F

## 2018-12-05 PROCEDURE — G0158 HHC OT ASSISTANT EA 15: HCPCS

## 2018-12-05 PROCEDURE — 3331090002 HH PPS REVENUE DEBIT

## 2018-12-05 PROCEDURE — 3331090001 HH PPS REVENUE CREDIT

## 2018-12-06 PROCEDURE — 3331090002 HH PPS REVENUE DEBIT

## 2018-12-06 PROCEDURE — 3331090001 HH PPS REVENUE CREDIT

## 2018-12-07 ENCOUNTER — HOME CARE VISIT (OUTPATIENT)
Dept: SCHEDULING | Facility: HOME HEALTH | Age: 58
End: 2018-12-07
Payer: MEDICARE

## 2018-12-07 ENCOUNTER — HOME CARE VISIT (OUTPATIENT)
Dept: HOME HEALTH SERVICES | Facility: HOME HEALTH | Age: 58
End: 2018-12-07
Payer: MEDICARE

## 2018-12-07 PROCEDURE — 3331090001 HH PPS REVENUE CREDIT

## 2018-12-07 PROCEDURE — 3331090002 HH PPS REVENUE DEBIT

## 2018-12-08 ENCOUNTER — HOME CARE VISIT (OUTPATIENT)
Dept: HOME HEALTH SERVICES | Facility: HOME HEALTH | Age: 58
End: 2018-12-08
Payer: MEDICARE

## 2018-12-08 PROCEDURE — 3331090002 HH PPS REVENUE DEBIT

## 2018-12-08 PROCEDURE — 3331090001 HH PPS REVENUE CREDIT

## 2018-12-09 PROCEDURE — 3331090001 HH PPS REVENUE CREDIT

## 2018-12-09 PROCEDURE — 3331090002 HH PPS REVENUE DEBIT

## 2018-12-10 ENCOUNTER — HOME CARE VISIT (OUTPATIENT)
Dept: HOME HEALTH SERVICES | Facility: HOME HEALTH | Age: 58
End: 2018-12-10
Payer: MEDICARE

## 2018-12-10 ENCOUNTER — HOME CARE VISIT (OUTPATIENT)
Dept: SCHEDULING | Facility: HOME HEALTH | Age: 58
End: 2018-12-10
Payer: MEDICARE

## 2018-12-10 VITALS
DIASTOLIC BLOOD PRESSURE: 60 MMHG | HEART RATE: 86 BPM | TEMPERATURE: 97.2 F | RESPIRATION RATE: 18 BRPM | SYSTOLIC BLOOD PRESSURE: 140 MMHG | OXYGEN SATURATION: 98 %

## 2018-12-10 PROCEDURE — 3331090002 HH PPS REVENUE DEBIT

## 2018-12-10 PROCEDURE — 3331090001 HH PPS REVENUE CREDIT

## 2018-12-10 PROCEDURE — G0158 HHC OT ASSISTANT EA 15: HCPCS

## 2018-12-11 ENCOUNTER — HOME CARE VISIT (OUTPATIENT)
Dept: HOME HEALTH SERVICES | Facility: HOME HEALTH | Age: 58
End: 2018-12-11
Payer: MEDICARE

## 2018-12-11 ENCOUNTER — HOME CARE VISIT (OUTPATIENT)
Dept: SCHEDULING | Facility: HOME HEALTH | Age: 58
End: 2018-12-11
Payer: MEDICARE

## 2018-12-11 VITALS
WEIGHT: 254.5 LBS | BODY MASS INDEX: 45.08 KG/M2 | OXYGEN SATURATION: 93 % | HEART RATE: 91 BPM | DIASTOLIC BLOOD PRESSURE: 75 MMHG | SYSTOLIC BLOOD PRESSURE: 162 MMHG

## 2018-12-11 PROCEDURE — 3331090001 HH PPS REVENUE CREDIT

## 2018-12-11 PROCEDURE — G0299 HHS/HOSPICE OF RN EA 15 MIN: HCPCS

## 2018-12-11 PROCEDURE — G0495 RN CARE TRAIN/EDU IN HH: HCPCS

## 2018-12-11 PROCEDURE — 3331090002 HH PPS REVENUE DEBIT

## 2018-12-12 ENCOUNTER — HOME CARE VISIT (OUTPATIENT)
Dept: SCHEDULING | Facility: HOME HEALTH | Age: 58
End: 2018-12-12
Payer: MEDICARE

## 2018-12-12 PROCEDURE — 3331090002 HH PPS REVENUE DEBIT

## 2018-12-12 PROCEDURE — 3331090001 HH PPS REVENUE CREDIT

## 2018-12-12 PROCEDURE — G0151 HHCP-SERV OF PT,EA 15 MIN: HCPCS

## 2018-12-13 VITALS
SYSTOLIC BLOOD PRESSURE: 146 MMHG | OXYGEN SATURATION: 98 % | DIASTOLIC BLOOD PRESSURE: 82 MMHG | HEART RATE: 79 BPM | TEMPERATURE: 97.2 F

## 2018-12-13 PROCEDURE — 3331090002 HH PPS REVENUE DEBIT

## 2018-12-13 PROCEDURE — 3331090001 HH PPS REVENUE CREDIT

## 2018-12-14 ENCOUNTER — HOME CARE VISIT (OUTPATIENT)
Dept: SCHEDULING | Facility: HOME HEALTH | Age: 58
End: 2018-12-14
Payer: MEDICARE

## 2018-12-14 ENCOUNTER — HOSPITAL ENCOUNTER (OUTPATIENT)
Dept: GENERAL RADIOLOGY | Age: 58
Discharge: HOME | End: 2018-12-14
Payer: MEDICARE

## 2018-12-14 VITALS
TEMPERATURE: 97.4 F | DIASTOLIC BLOOD PRESSURE: 78 MMHG | HEART RATE: 83 BPM | SYSTOLIC BLOOD PRESSURE: 150 MMHG | OXYGEN SATURATION: 97 % | RESPIRATION RATE: 20 BRPM

## 2018-12-14 DIAGNOSIS — R09.1 PLEURISY: ICD-10-CM

## 2018-12-14 DIAGNOSIS — J44.1 COPD EXACERBATION (HCC): ICD-10-CM

## 2018-12-14 PROCEDURE — 3331090002 HH PPS REVENUE DEBIT

## 2018-12-14 PROCEDURE — 3331090001 HH PPS REVENUE CREDIT

## 2018-12-14 PROCEDURE — 71046 X-RAY EXAM CHEST 2 VIEWS: CPT

## 2018-12-14 PROCEDURE — G0152 HHCP-SERV OF OT,EA 15 MIN: HCPCS

## 2018-12-14 PROCEDURE — G0157 HHC PT ASSISTANT EA 15: HCPCS

## 2018-12-15 PROCEDURE — 3331090001 HH PPS REVENUE CREDIT

## 2018-12-15 PROCEDURE — 3331090002 HH PPS REVENUE DEBIT

## 2018-12-16 PROCEDURE — 3331090002 HH PPS REVENUE DEBIT

## 2018-12-16 PROCEDURE — 3331090001 HH PPS REVENUE CREDIT

## 2018-12-17 PROCEDURE — 3331090002 HH PPS REVENUE DEBIT

## 2018-12-17 PROCEDURE — 3331090001 HH PPS REVENUE CREDIT

## 2018-12-18 PROCEDURE — 3331090001 HH PPS REVENUE CREDIT

## 2018-12-18 PROCEDURE — 3331090002 HH PPS REVENUE DEBIT

## 2018-12-19 ENCOUNTER — HOME CARE VISIT (OUTPATIENT)
Dept: SCHEDULING | Facility: HOME HEALTH | Age: 58
End: 2018-12-19
Payer: MEDICARE

## 2018-12-19 VITALS
TEMPERATURE: 97.5 F | SYSTOLIC BLOOD PRESSURE: 182 MMHG | OXYGEN SATURATION: 96 % | DIASTOLIC BLOOD PRESSURE: 92 MMHG | HEART RATE: 78 BPM

## 2018-12-19 PROCEDURE — 3331090002 HH PPS REVENUE DEBIT

## 2018-12-19 PROCEDURE — G0157 HHC PT ASSISTANT EA 15: HCPCS

## 2018-12-19 PROCEDURE — 3331090001 HH PPS REVENUE CREDIT

## 2018-12-20 PROCEDURE — 3331090002 HH PPS REVENUE DEBIT

## 2018-12-20 PROCEDURE — 3331090001 HH PPS REVENUE CREDIT

## 2018-12-21 ENCOUNTER — HOME CARE VISIT (OUTPATIENT)
Dept: SCHEDULING | Facility: HOME HEALTH | Age: 58
End: 2018-12-21
Payer: MEDICARE

## 2018-12-21 PROCEDURE — 3331090002 HH PPS REVENUE DEBIT

## 2018-12-21 PROCEDURE — 3331090001 HH PPS REVENUE CREDIT

## 2018-12-21 PROCEDURE — G0157 HHC PT ASSISTANT EA 15: HCPCS

## 2018-12-22 PROCEDURE — 3331090001 HH PPS REVENUE CREDIT

## 2018-12-22 PROCEDURE — 3331090002 HH PPS REVENUE DEBIT

## 2018-12-23 VITALS
OXYGEN SATURATION: 97 % | SYSTOLIC BLOOD PRESSURE: 171 MMHG | HEART RATE: 75 BPM | DIASTOLIC BLOOD PRESSURE: 87 MMHG | TEMPERATURE: 97.7 F

## 2018-12-23 PROCEDURE — 3331090002 HH PPS REVENUE DEBIT

## 2018-12-23 PROCEDURE — 3331090001 HH PPS REVENUE CREDIT

## 2018-12-24 ENCOUNTER — HOME CARE VISIT (OUTPATIENT)
Dept: SCHEDULING | Facility: HOME HEALTH | Age: 58
End: 2018-12-24
Payer: MEDICARE

## 2018-12-24 VITALS
DIASTOLIC BLOOD PRESSURE: 100 MMHG | OXYGEN SATURATION: 99 % | SYSTOLIC BLOOD PRESSURE: 186 MMHG | HEART RATE: 74 BPM | TEMPERATURE: 97.3 F

## 2018-12-24 PROCEDURE — 3331090001 HH PPS REVENUE CREDIT

## 2018-12-24 PROCEDURE — G0151 HHCP-SERV OF PT,EA 15 MIN: HCPCS

## 2018-12-24 PROCEDURE — 3331090002 HH PPS REVENUE DEBIT

## 2019-01-07 ENCOUNTER — OFFICE VISIT (OUTPATIENT)
Dept: PULMONOLOGY | Age: 59
End: 2019-01-07

## 2019-01-07 VITALS
TEMPERATURE: 97.8 F | BODY MASS INDEX: 44.65 KG/M2 | DIASTOLIC BLOOD PRESSURE: 72 MMHG | WEIGHT: 252 LBS | RESPIRATION RATE: 20 BRPM | HEIGHT: 63 IN | OXYGEN SATURATION: 95 % | HEART RATE: 74 BPM | SYSTOLIC BLOOD PRESSURE: 140 MMHG

## 2019-01-07 DIAGNOSIS — G47.33 OSA ON CPAP: Primary | ICD-10-CM

## 2019-01-07 DIAGNOSIS — J96.11 CHRONIC RESPIRATORY FAILURE WITH HYPOXIA (HCC): ICD-10-CM

## 2019-01-07 DIAGNOSIS — Z99.89 OSA ON CPAP: Primary | ICD-10-CM

## 2019-01-07 DIAGNOSIS — J44.1 COPD EXACERBATION (HCC): ICD-10-CM

## 2019-01-07 PROBLEM — J98.11 ATELECTASIS OF RIGHT LUNG: Status: ACTIVE | Noted: 2018-12-14

## 2019-01-07 RX ORDER — HYDROCHLOROTHIAZIDE 12.5 MG/1
12.5 TABLET ORAL DAILY
Status: ON HOLD | COMMUNITY
End: 2020-12-18

## 2019-01-07 RX ORDER — PREDNISONE 20 MG/1
TABLET ORAL
Qty: 14 TAB | Refills: 0 | Status: SHIPPED | OUTPATIENT
Start: 2019-01-07 | End: 2019-01-18

## 2019-01-07 NOTE — PROGRESS NOTES
[facility]  Pulmonary, Critical Care, and Sleep Medicine      Chief complaint:  COPD chronic respiratory failure sleep apnea    HPI:    Nilo Bahena    is 60years old returns the office today for follow-up concerning COPD and chronic respiratory failure and relates she is continuing to cough up slightly discolored mucus without blood without chest pain without leg swelling but with worsening shortness of breath. She is taking her albuterol 4 times a day and continues to be faithful with her CPAP and her Spiriva and Advair. Allergies   Allergen Reactions    Ancef [Cefazolin] Hives    Contrast Agent [Iodine] Anaphylaxis, Shortness of Breath and Swelling     Needs pre-medication for IV contrast with Benadryl, Solu-Medrol    Fish Containing Products Anaphylaxis     Pt states she had a severe allergic reaction at 9 y/o.  Metformin Other (comments)     Abdominal pain, diarrhea.  Codeine Other (comments)     Altered mental status     Current Outpatient Medications   Medication Sig    hydroCHLOROthiazide (HYDRODIURIL) 12.5 mg tablet Take 12.5 mg by mouth daily.  predniSONE (DELTASONE) 20 mg tablet 2 tablets every morning with breakfast for 7 days    fluticasone-salmeterol (ADVAIR HFA) 230-21 mcg/actuation inhaler Take 2 Puffs by inhalation two (2) times a day.  tiotropium bromide (SPIRIVA RESPIMAT) 2.5 mcg/actuation inhaler Take 2 Puffs by inhalation daily.  insulin glargine (LANTUS,BASAGLAR) 100 unit/mL (3 mL) inpn 35 Units by SubCUTAneous route nightly.  albuterol sulfate 90 mcg/actuation aepb Take 1 Puff by inhalation every four (4) hours.  NOVOLOG FLEXPEN U-100 INSULIN 100 unit/mL inpn Continue home Sliding scale insulin as prior to admission (Patient taking differently:  If BG <100=0u  101-150=5u  151-250=8u  251-300=12u  >300 call MD)    albuterol (PROVENTIL VENTOLIN) 2.5 mg /3 mL (0.083 %) nebulizer solution 3 mL by Nebulization route every four (4) hours as needed for Wheezing. Indications: BRONCHOSPASM PREVENTION, Chronic Obstructive Pulmonary Disease    omeprazole (PRILOSEC) 40 mg capsule Take 40 mg by mouth daily. Indications: gastroesophageal reflux disease    lisinopril (PRINIVIL, ZESTRIL) 40 mg tablet Take 20 mg by mouth two (2) times a day. Indications: hypertension    simethicone (MYLICON) 80 mg chewable tablet Take 80 mg by mouth every six (6) hours as needed for Flatulence.  cpap machine kit by Does Not Apply route nightly.  OXYGEN-AIR DELIVERY SYSTEMS 2 L by Nasal route continuous. First Choice    aspirin delayed-release 81 mg tablet Take 81 mg by mouth daily.  famotidine (PEPCID) 20 mg tablet Take 20 mg by mouth two (2) times daily as needed for Other (reflux).  albuterol (PROVENTIL HFA, VENTOLIN HFA, PROAIR HFA) 90 mcg/actuation inhaler Take 2 Puffs by inhalation every four (4) hours as needed for Wheezing. For the next 2 days after hospital discharge, use your inhaler 4 times a day.  fluticasone (FLONASE) 50 mcg/actuation nasal spray 2 Sprays by Both Nostrils route daily.  montelukast (SINGULAIR) 10 mg tablet Take 10 mg by mouth nightly.  indapamide (LOZOL) 1.25 mg tablet Take 1.25 mg by mouth daily.  albuterol-ipratropium (DUO-NEB) 2.5 mg-0.5 mg/3 ml nebu 3 mL by Nebulization route every six (6) hours as needed.  fluticasone-salmeterol (ADVAIR DISKUS) 100-50 mcg/dose diskus inhaler Take 1 Puff by inhalation every twelve (12) hours.  ipratropium (ATROVENT) 0.02 % soln 2.5 mL by Nebulization route four (4) times daily as needed. Indications: BRONCHOSPASM PREVENTION WITH COPD (Patient not taking: Reported on 11/22/2018)     No current facility-administered medications for this visit.       Past Medical History:   Diagnosis Date    Asthma     Chronic lung disease     COPD     Cystocele, midline     Diabetes mellitus (HCC)     GERD (gastroesophageal reflux disease)     Hidradenitis suppurativa     Hyperlipidemia     Hypertension     SHERRIE on CPAP     CPAP    Stress incontinence      Past Surgical History:   Procedure Laterality Date    BREAST SURGERY PROCEDURE UNLISTED      Right breast benign tumor removal    HX APPENDECTOMY      HX CHOLECYSTECTOMY      HX DILATION AND CURETTAGE      Dysfunctional uterine bleeding, thought 2/2 fibroids    HX TUBAL LIGATION       Social History     Socioeconomic History    Marital status: LEGALLY      Spouse name: Not on file    Number of children: Not on file    Years of education: Not on file    Highest education level: Not on file   Social Needs    Financial resource strain: Not on file    Food insecurity - worry: Not on file    Food insecurity - inability: Not on file    Transportation needs - medical: Not on file   [employer] needs - non-medical: Not on file   Occupational History    Not on file   Tobacco Use    Smoking status: Former Smoker     Packs/day: 1.00     Years: 30.00     Pack years: 30.00     Types: Cigarettes     Start date: 1966     Last attempt to quit: 2006     Years since quittin.3    Smokeless tobacco: Never Used    Tobacco comment: 1-1.5 packs per day   Substance and Sexual Activity    Alcohol use: No    Drug use: No    Sexual activity: No   Other Topics Concern    Not on file   Social History Narrative    Not on file     Family History   Problem Relation Age of Onset    Hypertension Mother     Stroke Mother     Breast Cancer Mother         Bilateral mastectomies    Cancer Mother         ovarian and breast    Heart Failure Mother     Heart Attack Father         2011    Heart Surgery Father         CABG    Heart Failure Father     COPD Sister         Heavy smoker    Cancer Sister         ovarian    Heart Failure Sister     Lung Disease Sister     Asthma Child     Cancer Maternal Aunt         pancreatic    Cancer Maternal Grandfather         stomach       Review of systems:  She denies fever chills poor appetite weight loss    Physical Exam:  Visit Vitals  /72 (BP 1 Location: Right arm, BP Patient Position: Sitting)   Pulse 74   Temp 97.8 °F (36.6 °C) (Oral)   Resp 20   Ht 5' 3\" (1.6 m)   Wt 114.3 kg (252 lb)   SpO2 95%   BMI 44.64 kg/m²       Well-developed and obese  HEENT: WNL  Lymph node exam: Supraclavicular cervical lymph nodes negative  Chest: Equal symmetrical expansion no dullness no wheezes rales rubs inspiratory and expiratory rhonchi in the right chest  Heart: Regular rhythm no gallop no murmur no JVD no peripheral edema  Extremities: No cyanosis clubbing or calf tenderness  Neurological: Alert and oriented    Labs:    O2 sat room air at rest 95%    Impression:     History of recurrent respiratory infections  Expiratory and inspiratory rhonchi suggesting an exacerbation of COPD along with history and physical exam  Plan:   Continue CPAP Spiriva Advair as needed albuterol  7-day course of prednisone  Sputum culture and Gram stain and the patient will call back for results in 1  Mary Pool MD , [facility]    CC: Imer Russell MD     [address] [facility], [address] [address],[place]     P: [phone]     F: [phone]

## 2019-01-07 NOTE — PATIENT INSTRUCTIONS
Continue Spiriva Respimat 2 inhalations daily    Continue Advair 2 inhalations twice daily and remember to wash mouth with water and spit it out after inhaling    Continue albuterol by nebulizer or by hand-held inhaler 2 puffs every 4 hours as needed if you require albuterol too often to control respiratory symptoms call the office for severe symptoms go to the emergency room    Always use her CPAP with sleep    Present sputum specimen to the hospital lab for culture and call back a week later for results    Prednisone 2 tablets every morning with breakfast for 7 days

## 2019-01-10 ENCOUNTER — HOSPITAL ENCOUNTER (EMERGENCY)
Age: 59
Discharge: HOME | End: 2019-01-10
Attending: EMERGENCY MEDICINE
Payer: MEDICARE

## 2019-01-10 ENCOUNTER — APPOINTMENT (OUTPATIENT)
Dept: GENERAL RADIOLOGY | Age: 59
End: 2019-01-10
Attending: EMERGENCY MEDICINE
Payer: MEDICARE

## 2019-01-10 VITALS
SYSTOLIC BLOOD PRESSURE: 145 MMHG | HEART RATE: 69 BPM | TEMPERATURE: 97.7 F | OXYGEN SATURATION: 94 % | DIASTOLIC BLOOD PRESSURE: 52 MMHG | RESPIRATION RATE: 22 BRPM

## 2019-01-10 DIAGNOSIS — J44.1 COPD EXACERBATION (HCC): Primary | ICD-10-CM

## 2019-01-10 LAB
ALBUMIN SERPL-MCNC: 3.7 G/DL (ref 3.4–5)
ALBUMIN/GLOB SERPL: 0.9 {RATIO} (ref 0.8–1.7)
ALP SERPL-CCNC: 94 U/L (ref 45–117)
ALT SERPL-CCNC: 52 U/L (ref 13–56)
ANION GAP SERPL CALC-SCNC: 7 MMOL/L (ref 3–18)
AST SERPL-CCNC: 40 U/L (ref 15–37)
ATRIAL RATE: 77 BPM
BASOPHILS # BLD: 0 K/UL (ref 0–0.1)
BASOPHILS NFR BLD: 0 % (ref 0–2)
BILIRUB SERPL-MCNC: 0.8 MG/DL (ref 0.2–1)
BNP SERPL-MCNC: 186 PG/ML (ref 0–900)
BUN SERPL-MCNC: 9 MG/DL (ref 7–18)
BUN/CREAT SERPL: 10 (ref 12–20)
CALCIUM SERPL-MCNC: 9.2 MG/DL (ref 8.5–10.1)
CALCULATED P AXIS, ECG09: 50 DEGREES
CALCULATED R AXIS, ECG10: 40 DEGREES
CALCULATED T AXIS, ECG11: 50 DEGREES
CHLORIDE SERPL-SCNC: 103 MMOL/L (ref 100–108)
CK MB CFR SERPL CALC: 1.2 % (ref 0–4)
CK MB SERPL-MCNC: 1.1 NG/ML (ref 5–25)
CK SERPL-CCNC: 90 U/L (ref 26–192)
CO2 SERPL-SCNC: 28 MMOL/L (ref 21–32)
CREAT SERPL-MCNC: 0.89 MG/DL (ref 0.6–1.3)
DIAGNOSIS, 93000: NORMAL
DIFFERENTIAL METHOD BLD: ABNORMAL
EOSINOPHIL # BLD: 0.1 K/UL (ref 0–0.4)
EOSINOPHIL NFR BLD: 2 % (ref 0–5)
ERYTHROCYTE [DISTWIDTH] IN BLOOD BY AUTOMATED COUNT: 13.6 % (ref 11.6–14.5)
GLOBULIN SER CALC-MCNC: 4 G/DL (ref 2–4)
GLUCOSE SERPL-MCNC: 211 MG/DL (ref 74–99)
HCT VFR BLD AUTO: 38.5 % (ref 35–45)
HGB BLD-MCNC: 13.5 G/DL (ref 12–16)
LYMPHOCYTES # BLD: 1.6 K/UL (ref 0.9–3.6)
LYMPHOCYTES NFR BLD: 26 % (ref 21–52)
MCH RBC QN AUTO: 30.7 PG (ref 24–34)
MCHC RBC AUTO-ENTMCNC: 35.1 G/DL (ref 31–37)
MCV RBC AUTO: 87.5 FL (ref 74–97)
MONOCYTES # BLD: 0.5 K/UL (ref 0.05–1.2)
MONOCYTES NFR BLD: 8 % (ref 3–10)
NEUTS SEG # BLD: 4 K/UL (ref 1.8–8)
NEUTS SEG NFR BLD: 64 % (ref 40–73)
P-R INTERVAL, ECG05: 154 MS
PLATELET # BLD AUTO: 259 K/UL (ref 135–420)
PMV BLD AUTO: 8.9 FL (ref 9.2–11.8)
POTASSIUM SERPL-SCNC: 4.1 MMOL/L (ref 3.5–5.5)
PROT SERPL-MCNC: 7.7 G/DL (ref 6.4–8.2)
Q-T INTERVAL, ECG07: 394 MS
QRS DURATION, ECG06: 88 MS
QTC CALCULATION (BEZET), ECG08: 445 MS
RBC # BLD AUTO: 4.4 M/UL (ref 4.2–5.3)
SODIUM SERPL-SCNC: 138 MMOL/L (ref 136–145)
TROPONIN I SERPL-MCNC: <0.02 NG/ML (ref 0–0.04)
VENTRICULAR RATE, ECG03: 77 BPM
WBC # BLD AUTO: 6.2 K/UL (ref 4.6–13.2)

## 2019-01-10 PROCEDURE — 74011000250 HC RX REV CODE- 250: Performed by: EMERGENCY MEDICINE

## 2019-01-10 PROCEDURE — 74011250636 HC RX REV CODE- 250/636: Performed by: EMERGENCY MEDICINE

## 2019-01-10 PROCEDURE — 83880 ASSAY OF NATRIURETIC PEPTIDE: CPT

## 2019-01-10 PROCEDURE — 94640 AIRWAY INHALATION TREATMENT: CPT

## 2019-01-10 PROCEDURE — 77030029684 HC NEB SM VOL KT MONA -A

## 2019-01-10 PROCEDURE — 74011250636 HC RX REV CODE- 250/636

## 2019-01-10 PROCEDURE — 93005 ELECTROCARDIOGRAM TRACING: CPT

## 2019-01-10 PROCEDURE — 71046 X-RAY EXAM CHEST 2 VIEWS: CPT

## 2019-01-10 PROCEDURE — 96365 THER/PROPH/DIAG IV INF INIT: CPT

## 2019-01-10 PROCEDURE — 96366 THER/PROPH/DIAG IV INF ADDON: CPT

## 2019-01-10 PROCEDURE — 82550 ASSAY OF CK (CPK): CPT

## 2019-01-10 PROCEDURE — 80053 COMPREHEN METABOLIC PANEL: CPT

## 2019-01-10 PROCEDURE — 74011000250 HC RX REV CODE- 250

## 2019-01-10 PROCEDURE — 74011250637 HC RX REV CODE- 250/637: Performed by: EMERGENCY MEDICINE

## 2019-01-10 PROCEDURE — 96375 TX/PRO/DX INJ NEW DRUG ADDON: CPT

## 2019-01-10 PROCEDURE — 85025 COMPLETE CBC W/AUTO DIFF WBC: CPT

## 2019-01-10 PROCEDURE — 99285 EMERGENCY DEPT VISIT HI MDM: CPT

## 2019-01-10 RX ORDER — LISINOPRIL 20 MG/1
40 TABLET ORAL
Status: COMPLETED | OUTPATIENT
Start: 2019-01-10 | End: 2019-01-10

## 2019-01-10 RX ORDER — LABETALOL HCL 20 MG/4 ML
20 SYRINGE (ML) INTRAVENOUS
Status: DISCONTINUED | OUTPATIENT
Start: 2019-01-10 | End: 2019-01-10

## 2019-01-10 RX ORDER — ALBUTEROL SULFATE 0.83 MG/ML
5 SOLUTION RESPIRATORY (INHALATION)
Status: COMPLETED | OUTPATIENT
Start: 2019-01-10 | End: 2019-01-10

## 2019-01-10 RX ORDER — IPRATROPIUM BROMIDE AND ALBUTEROL SULFATE 2.5; .5 MG/3ML; MG/3ML
3 SOLUTION RESPIRATORY (INHALATION) ONCE
Status: COMPLETED | OUTPATIENT
Start: 2019-01-10 | End: 2019-01-10

## 2019-01-10 RX ORDER — DOXYCYCLINE HYCLATE 100 MG
100 TABLET ORAL 2 TIMES DAILY
Qty: 14 TAB | Refills: 0 | Status: SHIPPED | OUTPATIENT
Start: 2019-01-10 | End: 2019-01-18

## 2019-01-10 RX ORDER — ALBUTEROL SULFATE 0.83 MG/ML
5 SOLUTION RESPIRATORY (INHALATION)
Status: DISCONTINUED | OUTPATIENT
Start: 2019-01-10 | End: 2019-01-10 | Stop reason: HOSPADM

## 2019-01-10 RX ORDER — MAGNESIUM SULFATE HEPTAHYDRATE 40 MG/ML
2 INJECTION, SOLUTION INTRAVENOUS
Status: COMPLETED | OUTPATIENT
Start: 2019-01-10 | End: 2019-01-10

## 2019-01-10 RX ORDER — ALBUTEROL SULFATE 0.83 MG/ML
SOLUTION RESPIRATORY (INHALATION)
Status: COMPLETED
Start: 2019-01-10 | End: 2019-01-10

## 2019-01-10 RX ORDER — LABETALOL HCL 20 MG/4 ML
SYRINGE (ML) INTRAVENOUS
Status: COMPLETED
Start: 2019-01-10 | End: 2019-01-10

## 2019-01-10 RX ORDER — DOXYCYCLINE 100 MG/1
100 CAPSULE ORAL
Status: COMPLETED | OUTPATIENT
Start: 2019-01-10 | End: 2019-01-10

## 2019-01-10 RX ORDER — PREDNISONE 20 MG/1
40 TABLET ORAL DAILY
Qty: 8 TAB | Refills: 0 | Status: SHIPPED | OUTPATIENT
Start: 2019-01-10 | End: 2019-01-14

## 2019-01-10 RX ADMIN — MAGNESIUM SULFATE HEPTAHYDRATE 2 G: 40 INJECTION, SOLUTION INTRAVENOUS at 13:14

## 2019-01-10 RX ADMIN — METHYLPREDNISOLONE SODIUM SUCCINATE 125 MG: 125 INJECTION, POWDER, FOR SOLUTION INTRAMUSCULAR; INTRAVENOUS at 12:59

## 2019-01-10 RX ADMIN — ALBUTEROL SULFATE 5 MG: 2.5 SOLUTION RESPIRATORY (INHALATION) at 12:58

## 2019-01-10 RX ADMIN — ALBUTEROL SULFATE 2.5 MG: 2.5 SOLUTION RESPIRATORY (INHALATION) at 15:11

## 2019-01-10 RX ADMIN — LABETALOL HYDROCHLORIDE 20 MG: 5 INJECTION, SOLUTION INTRAVENOUS at 14:35

## 2019-01-10 RX ADMIN — DOXYCYCLINE HYCLATE 100 MG: 100 CAPSULE ORAL at 13:14

## 2019-01-10 RX ADMIN — IPRATROPIUM BROMIDE AND ALBUTEROL SULFATE 3 ML: 2.5; .5 SOLUTION RESPIRATORY (INHALATION) at 12:26

## 2019-01-10 RX ADMIN — ALBUTEROL SULFATE 5 MG: 2.5 SOLUTION RESPIRATORY (INHALATION) at 12:57

## 2019-01-10 RX ADMIN — LISINOPRIL 40 MG: 20 TABLET ORAL at 13:14

## 2019-01-10 NOTE — ED NOTES
I performed a brief evaluation, including history and physical, of the patient here in triage and I have determined that pt will need further treatment and evaluation from the main side ER physician. I have placed initial orders to help in expediting patients care. January 10, 2019 at 12:25 PM - ANALI Durand There were no vitals taken for this visit.

## 2019-01-10 NOTE — ED TRIAGE NOTES
C/o SOB and wheezing X 1 month with worsening symptoms. Pt presents with some  Difficulty speaking in full sentences Sat 96% on RA . States HX of COPD, pt also reports possible CHF.  Pitting edema to lower extremities

## 2019-01-10 NOTE — ED PROVIDER NOTES
EMERGENCY DEPARTMENT HISTORY AND PHYSICAL EXAM    1:21 PM      Date: 1/10/2019  Patient Name: Charly Ziegler    History of Presenting Illness     Chief Complaint   Patient presents with    Shortness of Breath    Wheezing         History Provided By: Patient      Additional History (Context): 1:21 PM Charly Ziegler is a 60 y.o. female with h/o COPD, DM, HTN, asthma, and other noted PMHx who presents to ED complaining of worsening, moderate SOB onset 1 month, with associated wheezing, cough, nausea, and leg swelling. The patient reports that she has been intermittently sick for one month, which started with a cold or URI. The patient claims that she has been on abx for 3 weeks. She states that she saw her PCP 10 days ago and saw her pulmonologist on the Monday the 7th. The patient claims that her pulmonologist gave her 7 days of Prednisone, which she started on Tuesday. She denies CP, vomiting, blood thinner use, smoking tobacco use, and h/o blood clots. The patient also reports \"sore\" abd pain secondary to cough. She describes her cough as producing light green mucus. The patient notes that she has not taken her medications today because she has been nauseous \"all day. \" She claims that she was using her nebulizer at home every 3 or 4 hours, but feels like she needs to use it more often. No other concerns or symptoms at this time. PCP: Phuc Hurst MD        Current Outpatient Medications   Medication Sig Dispense Refill    doxycycline (VIBRA-TABS) 100 mg tablet Take 1 Tab by mouth two (2) times a day for 7 days. 14 Tab 0    hydroCHLOROthiazide (HYDRODIURIL) 12.5 mg tablet Take 12.5 mg by mouth daily.  predniSONE (DELTASONE) 20 mg tablet 2 tablets every morning with breakfast for 7 days 14 Tab 0    indapamide (LOZOL) 1.25 mg tablet Take 1.25 mg by mouth daily.  fluticasone-salmeterol (ADVAIR HFA) 230-21 mcg/actuation inhaler Take 2 Puffs by inhalation two (2) times a day.  tiotropium bromide (SPIRIVA RESPIMAT) 2.5 mcg/actuation inhaler Take 2 Puffs by inhalation daily.  insulin glargine (LANTUS,BASAGLAR) 100 unit/mL (3 mL) inpn 35 Units by SubCUTAneous route nightly. 28 Units 0    albuterol-ipratropium (DUO-NEB) 2.5 mg-0.5 mg/3 ml nebu 3 mL by Nebulization route every six (6) hours as needed. 30 Nebule 0    albuterol sulfate 90 mcg/actuation aepb Take 1 Puff by inhalation every four (4) hours. 1 Inhaler 0    NOVOLOG FLEXPEN U-100 INSULIN 100 unit/mL inpn Continue home Sliding scale insulin as prior to admission (Patient taking differently: If BG <100=0u  101-150=5u  151-250=8u  251-300=12u  >300 call MD) 1 Pen 0    fluticasone-salmeterol (ADVAIR DISKUS) 100-50 mcg/dose diskus inhaler Take 1 Puff by inhalation every twelve (12) hours. 1 Inhaler 0    ipratropium (ATROVENT) 0.02 % soln 2.5 mL by Nebulization route four (4) times daily as needed. Indications: BRONCHOSPASM PREVENTION WITH COPD (Patient not taking: Reported on 11/22/2018) 30 Vial 0    albuterol (PROVENTIL VENTOLIN) 2.5 mg /3 mL (0.083 %) nebulizer solution 3 mL by Nebulization route every four (4) hours as needed for Wheezing. Indications: BRONCHOSPASM PREVENTION, Chronic Obstructive Pulmonary Disease 30 Each 0    omeprazole (PRILOSEC) 40 mg capsule Take 40 mg by mouth daily. Indications: gastroesophageal reflux disease      lisinopril (PRINIVIL, ZESTRIL) 40 mg tablet Take 20 mg by mouth two (2) times a day. Indications: hypertension      simethicone (MYLICON) 80 mg chewable tablet Take 80 mg by mouth every six (6) hours as needed for Flatulence.  cpap machine kit by Does Not Apply route nightly.  OXYGEN-AIR DELIVERY SYSTEMS 2 L by Nasal route continuous. First Choice      aspirin delayed-release 81 mg tablet Take 81 mg by mouth daily.  famotidine (PEPCID) 20 mg tablet Take 20 mg by mouth two (2) times daily as needed for Other (reflux).       albuterol (PROVENTIL HFA, VENTOLIN HFA, PROAIR HFA) 90 mcg/actuation inhaler Take 2 Puffs by inhalation every four (4) hours as needed for Wheezing. For the next 2 days after hospital discharge, use your inhaler 4 times a day. 1 Inhaler 0    fluticasone (FLONASE) 50 mcg/actuation nasal spray 2 Sprays by Both Nostrils route daily.  montelukast (SINGULAIR) 10 mg tablet Take 10 mg by mouth nightly.          Past History     Past Medical History:  Past Medical History:   Diagnosis Date    Asthma     Chronic lung disease     COPD     Cystocele, midline     Diabetes mellitus ([facility] [address]     GERD (gastroesophageal reflux disease)     Hidradenitis suppurativa     Hyperlipidemia     Hypertension     SHERRIE on CPAP     CPAP    Stress incontinence        Past Surgical History:  Past Surgical History:   Procedure Laterality Date    BREAST SURGERY PROCEDURE UNLISTED      Right breast benign tumor removal    HX APPENDECTOMY      HX CHOLECYSTECTOMY      HX DILATION AND CURETTAGE      Dysfunctional uterine bleeding, thought 2/2 fibroids    HX TUBAL LIGATION         Family History:  Family History   Problem Relation Age of Onset    Hypertension Mother     Stroke Mother     Breast Cancer Mother         Bilateral mastectomies    Cancer Mother         ovarian and breast    Heart Failure Mother     Heart Attack Father         2011    Heart Surgery Father         CABG    Heart Failure Father     COPD Sister         Heavy smoker    Cancer Sister         ovarian    Heart Failure Sister     Lung Disease Sister     Asthma Child     Cancer Maternal Aunt         pancreatic    Cancer Maternal Grandfather         stomach       Social History:  Social History     Tobacco Use    Smoking status: Former Smoker     Packs/day: 1.00     Years: 30.00     Pack years: 30.00     Types: Cigarettes     Start date: 1966     Last attempt to quit: 2006     Years since quittin.3    Smokeless tobacco: Never Used    Tobacco comment: 1-1.5 packs per day Substance Use Topics    Alcohol use: No    Drug use: No       Allergies: Allergies   Allergen Reactions    Ancef [Cefazolin] Hives    Contrast Agent [Iodine] Anaphylaxis, Shortness of Breath and Swelling     Needs pre-medication for IV contrast with Benadryl, Solu-Medrol    Fish Containing Products Anaphylaxis     Pt states she had a severe allergic reaction at 7 y/o.  Metformin Other (comments)     Abdominal pain, diarrhea.  Codeine Other (comments)     Altered mental status         Review of Systems       Review of Systems   Respiratory: Positive for cough, shortness of breath and wheezing. Cardiovascular: Positive for leg swelling. Negative for chest pain. Gastrointestinal: Positive for abdominal pain and nausea. Negative for vomiting. All other systems reviewed and are negative. Physical Exam     Visit Vitals  /52   Pulse 69   Temp 97.7 °F (36.5 °C)   Resp 22   SpO2 94%         Physical Exam   Constitutional: She is oriented to person, place, and time. She appears well-developed and well-nourished. HENT:   Head: Normocephalic and atraumatic. Eyes: EOM are normal. Pupils are equal, round, and reactive to light. Right eye exhibits no discharge. Left eye exhibits no discharge. Neck: Normal range of motion. Neck supple. No JVD present. No tracheal deviation present. Cardiovascular: Normal rate, regular rhythm and normal heart sounds. No murmur heard. Pulses:       Radial pulses are 2+ on the right side, and 2+ on the left side. Pulmonary/Chest: Effort normal. No respiratory distress. She has wheezes. She has no rales. Very restricted air movement. Diffuse expiratory wheezing. Retractions. Pt is speaking in 3 word sentences. Abdominal: Soft. Bowel sounds are normal. She exhibits no distension. There is no tenderness. There is no rebound. Abd soft 4 quadrants. Musculoskeletal: Normal range of motion. She exhibits edema. She exhibits no tenderness or deformity. LE edema. No calf tenderness. Neurological: She is alert and oriented to person, place, and time. No cranial nerve deficit. 5/5 strength UE/LE, 5/5 sensation UE/LE     Skin: Skin is warm and dry. No rash noted. No erythema. Psychiatric: She has a normal mood and affect. Her behavior is normal.         Diagnostic Study Results     Labs -  No results found for this or any previous visit (from the past 12 hour(s)). Radiologic Studies -   XR CHEST PA LAT   Final Result   IMPRESSION:      Mild perihilar and basilar interstitial infiltrate or edema. No consolidation. Medical Decision Making   I am the first provider for this patient. I reviewed the vital signs, available nursing notes, past medical history, past surgical history, family history and social history. Vital Signs-Reviewed the patient's vital signs. EKG: Interpreted by the EP. Time Interpreted: 12:40   Rate: 77   Rhythm: Normal Sinus Rhythm    Interpretation: Nml intervals, nml axis. No signs of ischemia. Records Reviewed: Nursing Notes      Provider Notes (Medical Decision Making):     MDM  Number of Diagnoses or Management Options  Diagnosis management comments: Diff dx: copd exac, acs, pe    Pt having wheezing, worsening since 1mo after viral infection. Hx copd, on home o2, has not increased this. Speaking in 3 word sentences, poor air movement, c/w copd exac. Will give doxy (has only received augmentin, not covered for atypicals), mag, nebs, xray, bmp, cbc, reassess      Diagnosis     Clinical Impression:   1.  COPD exacerbation ([address]        Disposition: Home    Follow-up Information     Follow up With Specialties Details Why Contact Info    Elli Malone MD Family Practice In 2 days For follow-up [address] [id number] [phone]      [facility] EMERGENCY DEPT Emergency Medicine Go to As needed, If symptoms worsen [address] [id number] [phone] Medication List      START taking these medications    doxycycline 100 mg tablet  Commonly known as:  VIBRA-TABS  Take 1 Tab by mouth two (2) times a day for 7 days. CHANGE how you take these medications    NovoLOG Flexpen U-100 Insulin 100 unit/mL Inpn  Generic drug:  insulin aspart U-100  Continue home Sliding scale insulin as prior to admission  What changed:  additional instructions        CONTINUE taking these medications    * albuterol 90 mcg/actuation inhaler  Commonly known as:  PROVENTIL HFA, VENTOLIN HFA, PROAIR HFA  Take 2 Puffs by inhalation every four (4) hours as needed for Wheezing. For the next 2 days after hospital discharge, use your inhaler 4 times a day. * albuterol 2.5 mg /3 mL (0.083 %) nebulizer solution  Commonly known as:  PROVENTIL VENTOLIN  3 mL by Nebulization route every four (4) hours as needed for Wheezing. Indications: BRONCHOSPASM PREVENTION, Chronic Obstructive Pulmonary Disease     * albuterol sulfate 90 mcg/actuation Aepb  Take 1 Puff by inhalation every four (4) hours. albuterol-ipratropium 2.5 mg-0.5 mg/3 ml Nebu  Commonly known as:  DUO-NEB  3 mL by Nebulization route every six (6) hours as needed. aspirin delayed-release 81 mg tablet     cpap machine kit     famotidine 20 mg tablet  Commonly known as:  PEPCID     FLONASE 50 mcg/actuation nasal spray  Generic drug:  fluticasone     * ADVAIR -21 mcg/actuation inhaler  Generic drug:  fluticasone-salmeterol     * fluticasone-salmeterol 100-50 mcg/dose diskus inhaler  Commonly known as:  ADVAIR DISKUS  Take 1 Puff by inhalation every twelve (12) hours. hydroCHLOROthiazide 12.5 mg tablet  Commonly known as:  HYDRODIURIL     indapamide 1.25 mg tablet  Commonly known as:  LOZOL     insulin glargine 100 unit/mL (3 mL) Inpn  Commonly known as:  LANTUSBASAGLAR  35 Units by SubCUTAneous route nightly.      ipratropium 0.02 % Soln  Commonly known as:  ATROVENT  2.5 mL by Nebulization route four (4) times daily as needed. Indications: BRONCHOSPASM PREVENTION WITH COPD     lisinopril 40 mg tablet  Commonly known as:  PRINIVIL, ZESTRIL     omeprazole 40 mg capsule  Commonly known as:  PRILOSEC     OXYGEN-AIR DELIVERY SYSTEMS     * predniSONE 20 mg tablet  Commonly known as:  DELTASONE  2 tablets every morning with breakfast for 7 days     simethicone 80 mg chewable tablet  Commonly known as:  MYLICON     SINGULAIR 10 mg tablet  Generic drug:  montelukast     SPIRIVA RESPIMAT 2.5 mcg/actuation inhaler  Generic drug:  tiotropium bromide         * This list has 6 medication(s) that are the same as other medications prescribed for you. Read the directions carefully, and ask your doctor or other care provider to review them with you. ASK your doctor about these medications    * predniSONE 20 mg tablet  Commonly known as:  DELTASONE  Take 40 mg by mouth daily for 4 days. With Breakfast  Ask about: Should I take this medication? * This list has 1 medication(s) that are the same as other medications prescribed for you. Read the directions carefully, and ask your doctor or other care provider to review them with you. Where to Get Your Medications      Information about where to get these medications is not yet available    Ask your nurse or doctor about these medications  · doxycycline 100 mg tablet  · predniSONE 20 mg tablet       _______________________________    Attestations:  Scribe Attestation     Jadon Jones acting as a scribe for and in the presence of Phyllis Phipps MD      January 10, 2019 at [facility] AT [facility]       Provider Attestation:      I personally performed the services described in the documentation, reviewed the documentation, as recorded by the scribe in my presence, and it accurately and completely records my words and actions.  January 10, 2019 at 1:21 PM - Phyllis Phipps MD    _______________________________

## 2019-01-10 NOTE — DISCHARGE INSTRUCTIONS

## 2019-01-14 ENCOUNTER — HOSPITAL ENCOUNTER (OUTPATIENT)
Dept: LAB | Age: 59
Discharge: HOME | End: 2019-01-14
Payer: MEDICARE

## 2019-01-14 DIAGNOSIS — J44.1 COPD EXACERBATION (HCC): ICD-10-CM

## 2019-01-14 PROCEDURE — 87186 SC STD MICRODIL/AGAR DIL: CPT

## 2019-01-14 PROCEDURE — 87077 CULTURE AEROBIC IDENTIFY: CPT

## 2019-01-14 PROCEDURE — 87070 CULTURE OTHR SPECIMN AEROBIC: CPT

## 2019-01-16 LAB
BACTERIA SPEC CULT: ABNORMAL
BACTERIA SPEC CULT: ABNORMAL
GRAM STN SPEC: ABNORMAL
SERVICE CMNT-IMP: ABNORMAL

## 2019-01-16 RX ORDER — LEVOFLOXACIN 500 MG/1
500 TABLET, FILM COATED ORAL DAILY
Qty: 10 TAB | Refills: 0 | Status: SHIPPED | OUTPATIENT
Start: 2019-01-16 | End: 2019-01-18 | Stop reason: SDUPTHER

## 2019-01-18 ENCOUNTER — HOSPITAL ENCOUNTER (INPATIENT)
Age: 59
LOS: 3 days | Discharge: HOME | DRG: 191 | End: 2019-01-21
Attending: EMERGENCY MEDICINE | Admitting: FAMILY MEDICINE
Payer: MEDICARE

## 2019-01-18 ENCOUNTER — APPOINTMENT (OUTPATIENT)
Dept: GENERAL RADIOLOGY | Age: 59
DRG: 191 | End: 2019-01-18
Attending: EMERGENCY MEDICINE
Payer: MEDICARE

## 2019-01-18 DIAGNOSIS — R06.00 DYSPNEA, UNSPECIFIED TYPE: ICD-10-CM

## 2019-01-18 DIAGNOSIS — J44.1 ACUTE EXACERBATION OF CHRONIC OBSTRUCTIVE PULMONARY DISEASE (COPD) (HCC): Primary | ICD-10-CM

## 2019-01-18 DIAGNOSIS — R53.1 GENERALIZED WEAKNESS: ICD-10-CM

## 2019-01-18 LAB
ALBUMIN SERPL-MCNC: 3.7 G/DL (ref 3.4–5)
ALBUMIN/GLOB SERPL: 1 {RATIO} (ref 0.8–1.7)
ALP SERPL-CCNC: 95 U/L (ref 45–117)
ALT SERPL-CCNC: 52 U/L (ref 13–56)
ANION GAP SERPL CALC-SCNC: 8 MMOL/L (ref 3–18)
AST SERPL-CCNC: 25 U/L (ref 15–37)
ATRIAL RATE: 84 BPM
BASOPHILS # BLD: 0 K/UL (ref 0–0.1)
BASOPHILS NFR BLD: 0 % (ref 0–2)
BILIRUB SERPL-MCNC: 0.5 MG/DL (ref 0.2–1)
BUN SERPL-MCNC: 11 MG/DL (ref 7–18)
BUN/CREAT SERPL: 12 (ref 12–20)
CALCIUM SERPL-MCNC: 9.2 MG/DL (ref 8.5–10.1)
CALCULATED P AXIS, ECG09: 44 DEGREES
CALCULATED R AXIS, ECG10: 23 DEGREES
CALCULATED T AXIS, ECG11: 55 DEGREES
CHLORIDE SERPL-SCNC: 102 MMOL/L (ref 100–108)
CK MB CFR SERPL CALC: 1.4 % (ref 0–4)
CK MB SERPL-MCNC: 1.3 NG/ML (ref 5–25)
CK SERPL-CCNC: 93 U/L (ref 26–192)
CO2 SERPL-SCNC: 29 MMOL/L (ref 21–32)
CREAT SERPL-MCNC: 0.93 MG/DL (ref 0.6–1.3)
DIAGNOSIS, 93000: NORMAL
DIFFERENTIAL METHOD BLD: ABNORMAL
EOSINOPHIL # BLD: 0.1 K/UL (ref 0–0.4)
EOSINOPHIL NFR BLD: 2 % (ref 0–5)
ERYTHROCYTE [DISTWIDTH] IN BLOOD BY AUTOMATED COUNT: 13.5 % (ref 11.6–14.5)
EST. AVERAGE GLUCOSE BLD GHB EST-MCNC: 183 MG/DL
GLOBULIN SER CALC-MCNC: 3.7 G/DL (ref 2–4)
GLUCOSE BLD STRIP.AUTO-MCNC: 269 MG/DL (ref 70–110)
GLUCOSE SERPL-MCNC: 233 MG/DL (ref 74–99)
HBA1C MFR BLD: 8 % (ref 4.2–5.6)
HCT VFR BLD AUTO: 39.7 % (ref 35–45)
HGB BLD-MCNC: 13.8 G/DL (ref 12–16)
LYMPHOCYTES # BLD: 2.2 K/UL (ref 0.9–3.6)
LYMPHOCYTES NFR BLD: 33 % (ref 21–52)
MCH RBC QN AUTO: 30.5 PG (ref 24–34)
MCHC RBC AUTO-ENTMCNC: 34.8 G/DL (ref 31–37)
MCV RBC AUTO: 87.6 FL (ref 74–97)
MONOCYTES # BLD: 0.4 K/UL (ref 0.05–1.2)
MONOCYTES NFR BLD: 5 % (ref 3–10)
NEUTS SEG # BLD: 4.2 K/UL (ref 1.8–8)
NEUTS SEG NFR BLD: 60 % (ref 40–73)
P-R INTERVAL, ECG05: 140 MS
PLATELET # BLD AUTO: 302 K/UL (ref 135–420)
PMV BLD AUTO: 9.1 FL (ref 9.2–11.8)
POTASSIUM SERPL-SCNC: 3.8 MMOL/L (ref 3.5–5.5)
PROT SERPL-MCNC: 7.4 G/DL (ref 6.4–8.2)
Q-T INTERVAL, ECG07: 362 MS
QRS DURATION, ECG06: 82 MS
QTC CALCULATION (BEZET), ECG08: 427 MS
RBC # BLD AUTO: 4.53 M/UL (ref 4.2–5.3)
SODIUM SERPL-SCNC: 139 MMOL/L (ref 136–145)
TROPONIN I SERPL-MCNC: <0.02 NG/ML (ref 0–0.04)
VENTRICULAR RATE, ECG03: 84 BPM
WBC # BLD AUTO: 6.9 K/UL (ref 4.6–13.2)

## 2019-01-18 PROCEDURE — 99284 EMERGENCY DEPT VISIT MOD MDM: CPT

## 2019-01-18 PROCEDURE — 96365 THER/PROPH/DIAG IV INF INIT: CPT

## 2019-01-18 PROCEDURE — 96375 TX/PRO/DX INJ NEW DRUG ADDON: CPT

## 2019-01-18 PROCEDURE — 65660000000 HC RM CCU STEPDOWN

## 2019-01-18 PROCEDURE — 94760 N-INVAS EAR/PLS OXIMETRY 1: CPT

## 2019-01-18 PROCEDURE — 85025 COMPLETE CBC W/AUTO DIFF WBC: CPT

## 2019-01-18 PROCEDURE — 93005 ELECTROCARDIOGRAM TRACING: CPT

## 2019-01-18 PROCEDURE — 82550 ASSAY OF CK (CPK): CPT

## 2019-01-18 PROCEDURE — 77030029684 HC NEB SM VOL KT MONA -A

## 2019-01-18 PROCEDURE — 74011000250 HC RX REV CODE- 250: Performed by: EMERGENCY MEDICINE

## 2019-01-18 PROCEDURE — 74011250636 HC RX REV CODE- 250/636: Performed by: EMERGENCY MEDICINE

## 2019-01-18 PROCEDURE — 94640 AIRWAY INHALATION TREATMENT: CPT

## 2019-01-18 PROCEDURE — 71045 X-RAY EXAM CHEST 1 VIEW: CPT

## 2019-01-18 PROCEDURE — 80053 COMPREHEN METABOLIC PANEL: CPT

## 2019-01-18 PROCEDURE — 82962 GLUCOSE BLOOD TEST: CPT

## 2019-01-18 PROCEDURE — 74011000250 HC RX REV CODE- 250

## 2019-01-18 PROCEDURE — 83036 HEMOGLOBIN GLYCOSYLATED A1C: CPT

## 2019-01-18 RX ORDER — MONTELUKAST SODIUM 10 MG/1
10 TABLET ORAL
Status: DISCONTINUED | OUTPATIENT
Start: 2019-01-18 | End: 2019-01-21 | Stop reason: HOSPADM

## 2019-01-18 RX ORDER — LEVOFLOXACIN 5 MG/ML
750 INJECTION, SOLUTION INTRAVENOUS EVERY 24 HOURS
Status: DISCONTINUED | OUTPATIENT
Start: 2019-01-19 | End: 2019-01-20

## 2019-01-18 RX ORDER — NALOXONE HYDROCHLORIDE 0.4 MG/ML
0.4 INJECTION, SOLUTION INTRAMUSCULAR; INTRAVENOUS; SUBCUTANEOUS AS NEEDED
Status: DISCONTINUED | OUTPATIENT
Start: 2019-01-18 | End: 2019-01-21 | Stop reason: HOSPADM

## 2019-01-18 RX ORDER — INSULIN GLARGINE 100 [IU]/ML
30 INJECTION, SOLUTION SUBCUTANEOUS
Status: DISCONTINUED | OUTPATIENT
Start: 2019-01-18 | End: 2019-01-21 | Stop reason: HOSPADM

## 2019-01-18 RX ORDER — IPRATROPIUM BROMIDE AND ALBUTEROL SULFATE 2.5; .5 MG/3ML; MG/3ML
3 SOLUTION RESPIRATORY (INHALATION)
Status: DISCONTINUED | OUTPATIENT
Start: 2019-01-19 | End: 2019-01-21 | Stop reason: HOSPADM

## 2019-01-18 RX ORDER — SODIUM CHLORIDE 0.9 % (FLUSH) 0.9 %
5-40 SYRINGE (ML) INJECTION AS NEEDED
Status: DISCONTINUED | OUTPATIENT
Start: 2019-01-18 | End: 2019-01-21 | Stop reason: HOSPADM

## 2019-01-18 RX ORDER — ALBUTEROL SULFATE 0.83 MG/ML
2.5 SOLUTION RESPIRATORY (INHALATION)
Status: COMPLETED | OUTPATIENT
Start: 2019-01-18 | End: 2019-01-18

## 2019-01-18 RX ORDER — PANTOPRAZOLE SODIUM 40 MG/1
40 TABLET, DELAYED RELEASE ORAL
Status: DISCONTINUED | OUTPATIENT
Start: 2019-01-19 | End: 2019-01-21 | Stop reason: HOSPADM

## 2019-01-18 RX ORDER — ALBUTEROL SULFATE 0.83 MG/ML
SOLUTION RESPIRATORY (INHALATION)
Status: COMPLETED
Start: 2019-01-18 | End: 2019-01-18

## 2019-01-18 RX ORDER — PREDNISONE 20 MG/1
40 TABLET ORAL
Status: DISCONTINUED | OUTPATIENT
Start: 2019-01-19 | End: 2019-01-21 | Stop reason: HOSPADM

## 2019-01-18 RX ORDER — ASPIRIN 81 MG/1
81 TABLET ORAL DAILY
Status: DISCONTINUED | OUTPATIENT
Start: 2019-01-19 | End: 2019-01-21 | Stop reason: HOSPADM

## 2019-01-18 RX ORDER — HEPARIN SODIUM 5000 [USP'U]/ML
5000 INJECTION, SOLUTION INTRAVENOUS; SUBCUTANEOUS EVERY 12 HOURS
Status: DISCONTINUED | OUTPATIENT
Start: 2019-01-18 | End: 2019-01-21 | Stop reason: HOSPADM

## 2019-01-18 RX ORDER — FLUTICASONE PROPIONATE 50 MCG
2 SPRAY, SUSPENSION (ML) NASAL DAILY
Status: DISCONTINUED | OUTPATIENT
Start: 2019-01-19 | End: 2019-01-18

## 2019-01-18 RX ORDER — SIMETHICONE 80 MG
80 TABLET,CHEWABLE ORAL
Status: DISCONTINUED | OUTPATIENT
Start: 2019-01-18 | End: 2019-01-21 | Stop reason: HOSPADM

## 2019-01-18 RX ORDER — SODIUM CHLORIDE 0.9 % (FLUSH) 0.9 %
5-40 SYRINGE (ML) INJECTION EVERY 8 HOURS
Status: DISCONTINUED | OUTPATIENT
Start: 2019-01-18 | End: 2019-01-21 | Stop reason: HOSPADM

## 2019-01-18 RX ORDER — LISINOPRIL 20 MG/1
20 TABLET ORAL 2 TIMES DAILY
Status: DISCONTINUED | OUTPATIENT
Start: 2019-01-19 | End: 2019-01-21 | Stop reason: HOSPADM

## 2019-01-18 RX ORDER — MAGNESIUM SULFATE HEPTAHYDRATE 40 MG/ML
2 INJECTION, SOLUTION INTRAVENOUS ONCE
Status: COMPLETED | OUTPATIENT
Start: 2019-01-18 | End: 2019-01-18

## 2019-01-18 RX ORDER — FAMOTIDINE 20 MG/1
20 TABLET, FILM COATED ORAL
Status: DISCONTINUED | OUTPATIENT
Start: 2019-01-18 | End: 2019-01-21 | Stop reason: HOSPADM

## 2019-01-18 RX ORDER — PROMETHAZINE HYDROCHLORIDE 6.25 MG/5ML
25 SYRUP ORAL
Status: DISCONTINUED | OUTPATIENT
Start: 2019-01-18 | End: 2019-01-21 | Stop reason: HOSPADM

## 2019-01-18 RX ORDER — MAGNESIUM SULFATE 100 %
4 CRYSTALS MISCELLANEOUS AS NEEDED
Status: DISCONTINUED | OUTPATIENT
Start: 2019-01-18 | End: 2019-01-21 | Stop reason: HOSPADM

## 2019-01-18 RX ORDER — DEXTROSE 50 % IN WATER (D50W) INTRAVENOUS SYRINGE
25-50 AS NEEDED
Status: DISCONTINUED | OUTPATIENT
Start: 2019-01-18 | End: 2019-01-21 | Stop reason: HOSPADM

## 2019-01-18 RX ORDER — ACETAMINOPHEN 325 MG/1
650 TABLET ORAL
Status: DISCONTINUED | OUTPATIENT
Start: 2019-01-18 | End: 2019-01-21 | Stop reason: HOSPADM

## 2019-01-18 RX ORDER — IPRATROPIUM BROMIDE AND ALBUTEROL SULFATE 2.5; .5 MG/3ML; MG/3ML
SOLUTION RESPIRATORY (INHALATION)
Status: COMPLETED
Start: 2019-01-18 | End: 2019-01-18

## 2019-01-18 RX ORDER — INSULIN LISPRO 100 [IU]/ML
INJECTION, SOLUTION INTRAVENOUS; SUBCUTANEOUS
Status: DISCONTINUED | OUTPATIENT
Start: 2019-01-19 | End: 2019-01-21 | Stop reason: HOSPADM

## 2019-01-18 RX ORDER — HYDROCHLOROTHIAZIDE 25 MG/1
12.5 TABLET ORAL DAILY
Status: DISCONTINUED | OUTPATIENT
Start: 2019-01-19 | End: 2019-01-21 | Stop reason: HOSPADM

## 2019-01-18 RX ORDER — ONDANSETRON 4 MG/1
4 TABLET, ORALLY DISINTEGRATING ORAL
Status: DISCONTINUED | OUTPATIENT
Start: 2019-01-18 | End: 2019-01-21 | Stop reason: HOSPADM

## 2019-01-18 RX ORDER — IPRATROPIUM BROMIDE AND ALBUTEROL SULFATE 2.5; .5 MG/3ML; MG/3ML
3 SOLUTION RESPIRATORY (INHALATION)
Status: COMPLETED | OUTPATIENT
Start: 2019-01-18 | End: 2019-01-18

## 2019-01-18 RX ADMIN — MAGNESIUM SULFATE HEPTAHYDRATE 2 G: 40 INJECTION, SOLUTION INTRAVENOUS at 19:54

## 2019-01-18 RX ADMIN — ALBUTEROL SULFATE 2.5 MG: 2.5 SOLUTION RESPIRATORY (INHALATION) at 19:55

## 2019-01-18 RX ADMIN — IPRATROPIUM BROMIDE AND ALBUTEROL SULFATE 3 ML: 2.5; .5 SOLUTION RESPIRATORY (INHALATION) at 18:20

## 2019-01-18 RX ADMIN — IPRATROPIUM BROMIDE AND ALBUTEROL SULFATE 3 ML: .5; 3 SOLUTION RESPIRATORY (INHALATION) at 18:22

## 2019-01-18 RX ADMIN — ALBUTEROL SULFATE 2.5 MG: 2.5 SOLUTION RESPIRATORY (INHALATION) at 18:19

## 2019-01-18 RX ADMIN — IPRATROPIUM BROMIDE AND ALBUTEROL SULFATE 3 ML: .5; 3 SOLUTION RESPIRATORY (INHALATION) at 18:20

## 2019-01-18 RX ADMIN — METHYLPREDNISOLONE SODIUM SUCCINATE 125 MG: 125 INJECTION, POWDER, FOR SOLUTION INTRAMUSCULAR; INTRAVENOUS at 19:54

## 2019-01-18 NOTE — ED PROVIDER NOTES
EMERGENCY DEPARTMENT HISTORY AND PHYSICAL EXAM    6:07 PM      Date: 1/18/2019  Patient Name: Charly Ziegler    History of Presenting Illness     Chief Complaint   Patient presents with    Shortness of Breath         History Provided By: Patient    Chief Complaint: sob  Duration:  Days  Timing:  Progressive  Location: lungs  Quality: N/A  Severity: Moderate  Modifying Factors: has been on augmentin, zpak, levaquin and doxycycline in the past month, along with steroids last week but no improvement  Associated Symptoms: productive cough, wheezing, generalized weakness      Additional History (Context): Charly Ziegler is a 60 y.o. female with diabetes and COPD who presents with productive cough and wheezing. She states despite multiple antibiotics, and prednisone she is not improving. Dr Cristian Bright did sputum culture and started her on Levaquin 3 days ago. No cp, dizziness, vomiting or fever. No other complaints.      PCP: Phuc Hurst MD      Past History     Past Medical History:  Past Medical History:   Diagnosis Date    Asthma     Chronic lung disease     COPD     Cystocele, midline     Diabetes mellitus ([address]     GERD (gastroesophageal reflux disease)     Hidradenitis suppurativa     Hyperlipidemia     Hypertension     SHERRIE on CPAP     CPAP    Stress incontinence        Past Surgical History:  Past Surgical History:   Procedure Laterality Date    BREAST SURGERY PROCEDURE UNLISTED      Right breast benign tumor removal    HX APPENDECTOMY      HX CHOLECYSTECTOMY      HX DILATION AND CURETTAGE      Dysfunctional uterine bleeding, thought 2/2 fibroids    HX TUBAL LIGATION         Family History:  Family History   Problem Relation Age of Onset    Hypertension Mother     Stroke Mother     Breast Cancer Mother         Bilateral mastectomies    Cancer Mother         ovarian and breast    Heart Failure Mother     Heart Attack Father         2011    Heart Surgery Father CABG    Heart Failure Father     COPD Sister         Heavy smoker    Cancer Sister         ovarian    Heart Failure Sister     Lung Disease Sister     Asthma Child     Cancer Maternal Aunt         pancreatic    Cancer Maternal Grandfather         stomach       Social History:  Social History     Tobacco Use    Smoking status: Former Smoker     Packs/day: 1.00     Years: 30.00     Pack years: 30.00     Types: Cigarettes     Start date: 1966     Last attempt to quit: 2006     Years since quittin.3    Smokeless tobacco: Never Used    Tobacco comment: 1-1.5 packs per day   Substance Use Topics    Alcohol use: No    Drug use: No       Allergies: Allergies   Allergen Reactions    Ancef [Cefazolin] Hives    Contrast Agent [Iodine] Anaphylaxis, Shortness of Breath and Swelling     Needs pre-medication for IV contrast with Benadryl, Solu-Medrol    Fish Containing Products Anaphylaxis     Pt states she had a severe allergic reaction at 7 y/o.  Metformin Other (comments)     Abdominal pain, diarrhea.  Codeine Other (comments)     Altered mental status         Review of Systems       Review of Systems   Constitutional: Negative for chills and fever. HENT: Negative for congestion, rhinorrhea, sore throat and trouble swallowing. Eyes: Negative for visual disturbance. Respiratory: Positive for cough, shortness of breath and wheezing. Cardiovascular: Negative for chest pain. Gastrointestinal: Negative for abdominal pain, nausea and vomiting. Endocrine: Negative for polyuria. Genitourinary: Negative for dysuria. Musculoskeletal: Negative for arthralgias and neck stiffness. Skin: Negative for pallor and rash. Neurological: Positive for weakness (generalized weakness). Negative for dizziness, numbness and headaches. Hematological: Does not bruise/bleed easily. Psychiatric/Behavioral: Negative for confusion and dysphoric mood.    All other systems reviewed and are negative. Physical Exam     Visit Vitals  /57   Pulse 93   Temp 98.4 °F (36.9 °C)   Resp 22   Wt 109.8 kg (242 lb)   SpO2 98%   BMI 42.87 kg/m²         Physical Exam   Constitutional: She is oriented to person, place, and time. She appears well-developed and well-nourished. No distress. HENT:   Head: Normocephalic and atraumatic. Mouth/Throat: Oropharynx is clear and moist.   Eyes: Conjunctivae are normal. Pupils are equal, round, and reactive to light. No scleral icterus. Neck: Normal range of motion. Neck supple. Cardiovascular: Normal rate and intact distal pulses. Exam reveals no gallop and no friction rub. Capillary refill < 3 seconds   Pulmonary/Chest: No stridor. She has no rales. Active cough at bedside  Using accessory muscles to breath  Respiratory distress  Diffuse wheezing   Abdominal: Soft. Bowel sounds are normal. She exhibits no distension. There is no tenderness. Musculoskeletal: Normal range of motion. She exhibits no edema. Lymphadenopathy:     She has no cervical adenopathy. Neurological: She is alert and oriented to person, place, and time. No cranial nerve deficit. She exhibits normal muscle tone. Coordination normal.   Skin: Skin is warm and dry. No rash noted. She is not diaphoretic. Psychiatric: She has a normal mood and affect. Her behavior is normal.   Nursing note and vitals reviewed.         Diagnostic Study Results     Labs -  Recent Results (from the past 12 hour(s))   EKG, 12 LEAD, INITIAL    Collection Time: 01/18/19  5:56 PM   Result Value Ref Range    Ventricular Rate 84 BPM    Atrial Rate 84 BPM    P-R Interval 140 ms    QRS Duration 82 ms    Q-T Interval 362 ms    QTC Calculation (Bezet) 427 ms    Calculated P Axis 44 degrees    Calculated R Axis 23 degrees    Calculated T Axis 55 degrees    Diagnosis       Normal sinus rhythm  Normal ECG  When compared with ECG of 10-JAMMIE-2019 12:38,  No significant change was found  Confirmed by Analia Espinoza MD, Alondra Dorsey ([phone]) on 1/18/2019 8:16:35 PM     CBC WITH AUTOMATED DIFF    Collection Time: 01/18/19  6:05 PM   Result Value Ref Range    WBC 6.9 4.6 - 13.2 K/uL    RBC 4.53 4.20 - 5.30 M/uL    HGB 13.8 12.0 - 16.0 g/dL    HCT 39.7 35.0 - 45.0 %    MCV 87.6 74.0 - 97.0 FL    MCH 30.5 24.0 - 34.0 PG    MCHC 34.8 31.0 - 37.0 g/dL    RDW 13.5 11.6 - 14.5 %    PLATELET 841 999 - 001 K/uL    MPV 9.1 (L) 9.2 - 11.8 FL    NEUTROPHILS 60 40 - 73 %    LYMPHOCYTES 33 21 - 52 %    MONOCYTES 5 3 - 10 %    EOSINOPHILS 2 0 - 5 %    BASOPHILS 0 0 - 2 %    ABS. NEUTROPHILS 4.2 1.8 - 8.0 K/UL    ABS. LYMPHOCYTES 2.2 0.9 - 3.6 K/UL    ABS. MONOCYTES 0.4 0.05 - 1.2 K/UL    ABS. EOSINOPHILS 0.1 0.0 - 0.4 K/UL    ABS. BASOPHILS 0.0 0.0 - 0.1 K/UL    DF AUTOMATED     METABOLIC PANEL, COMPREHENSIVE    Collection Time: 01/18/19  6:05 PM   Result Value Ref Range    Sodium 139 136 - 145 mmol/L    Potassium 3.8 3.5 - 5.5 mmol/L    Chloride 102 100 - 108 mmol/L    CO2 29 21 - 32 mmol/L    Anion gap 8 3.0 - 18 mmol/L    Glucose 233 (H) 74 - 99 mg/dL    BUN 11 7.0 - 18 MG/DL    Creatinine 0.93 0.6 - 1.3 MG/DL    BUN/Creatinine ratio 12 12 - 20      GFR est AA >60 >60 ml/min/1.73m2    GFR est non-AA >60 >60 ml/min/1.73m2    Calcium 9.2 8.5 - 10.1 MG/DL    Bilirubin, total 0.5 0.2 - 1.0 MG/DL    ALT (SGPT) 52 13 - 56 U/L    AST (SGOT) 25 15 - 37 U/L    Alk. phosphatase 95 45 - 117 U/L    Protein, total 7.4 6.4 - 8.2 g/dL    Albumin 3.7 3.4 - 5.0 g/dL    Globulin 3.7 2.0 - 4.0 g/dL    A-G Ratio 1.0 0.8 - 1.7     CARDIAC PANEL,(CK, CKMB & TROPONIN)    Collection Time: 01/18/19  6:05 PM   Result Value Ref Range    CK 93 26 - 192 U/L    CK - MB 1.3 <3.6 ng/ml    CK-MB Index 1.4 0.0 - 4.0 %    Troponin-I, QT <0.02 0.0 - 0.045 NG/ML       Radiologic Studies -   XR CHEST SNGL V    (Results Pending)   per Dr Chad Boucher prelim read: no acute process      Medical Decision Making   I am the first provider for this patient.     I reviewed the vital signs, available nursing notes, past medical history, past surgical history, family history and social history. Vital Signs-Reviewed the patient's vital signs. Pulse Oximetry Analysis - 99% on  2L of O2 via NC (Interpretation)normal;  Pt on 2L O2 at home    Cardiac Monitor:  Rate: 88  Rhythm:  Normal Sinus Rhythm     EKG: Interpreted by the EP. Time Interpreted: 5:58PM   Rate: 84   Rhythm: Normal Sinus Rhythm    Interpretation:normal axis, normal QRS, normal QTC, no ESTEVAN and no T wave inversions      Records Reviewed: Nursing Notes and Old Medical Records (Time of Review: 6:07 PM)    Provider Notes (Medical Decision Making): ddx copd exacerbation, URI, PNA, metabolic, cardiac    Labs, ekg, CxR, nebs, steroids    MDM    Medications   magnesium sulfate 2 g/50 ml IVPB (premix or compounded) (2 g IntraVENous New Bag 1/18/19 1954)   albuterol (PROVENTIL VENTOLIN) 2.5 mg /3 mL (0.083 %) nebulizer solution (2.5 mg Nebulization Given 1/18/19 1819)   albuterol-ipratropium (DUO-NEB) 2.5 MG-0.5 MG/3 ML (3 mL Nebulization Given 1/18/19 1820)   albuterol-ipratropium (DUO-NEB) 2.5 MG-0.5 MG/3 ML (3 mL Nebulization Given 1/18/19 1822)   albuterol (PROVENTIL VENTOLIN) nebulizer solution 2.5 mg (2.5 mg Nebulization Given 1/18/19 1955)   methylPREDNISolone (PF) (Solu-MEDROL) injection 125 mg (125 mg IntraVENous Given 1/18/19 1954)           ED Course: Progress Notes, Reevaluation, and Consults:  Wbc wnl  Afebrile    Reassessed and despite multiple nebs and mag, steroid, pt still sob wheezing. Will admit. I discussed dx's, results and plan with pt who agrees. Consult:  Discussed care with Dr Navi Graves, Specialty: PFM Attending  Standard discussion; including history of patients chief complaint, available diagnostic results, and treatment course. Accepts pt for admission  8:15 PM, 1/18/2019     I discussed with PFM resident.      Critical care time:   I have spent 47 minutes of critical care time involved in lab review, consultations with specialist, family decision making, and documentation. During this entire length of time I was immediately available to the patient. Critical care: The reason for providing this level of medical care for this critically ill patient was due to a critical illness that impaired one or more vital organ systems such that there was a high probability of imminent or life threatening deterioration in the patients condition. This care involved high complexity decision making to assess, manipulate and support vital system functions, to treat this degree vital organ system failure and to prevent further life threatening deterioration of the patient's condition. For Hospitalized Patients:    1. Hospitalization Decision Time:  The decision to hospitalize the patient was made by Dr. Madelyn Anaya at 8:10PM on 1/18/2019    2. Aspirin: Aspirin was not given because the patient did not present with a stroke at the time of their Emergency Department evaluation    Diagnosis     Clinical Impression:   1. Acute exacerbation of chronic obstructive pulmonary disease (COPD) ([facility] [address]    2. Dyspnea, unspecified type    3. Generalized weakness        Disposition: admit    Follow-up Information    None              Attestations:  Scribe Attestation     Evelio Hernandez DO acting as a scribe for and in the presence of Kody Villafana DO      January 18, 2019 at 8:28 PM       Provider Attestation:      I personally performed the services described in the documentation, reviewed the documentation, as recorded by the scribe in my presence, and it accurately and completely records my words and actions.  January 18, 2019 at 8:28 PM - Kody Villafana DO    _______________________________

## 2019-01-18 NOTE — ED TRIAGE NOTES
Patient arrived from home c/o copd exacerbation. Patient states she has been feeling shortness of breath since her last visit to the ER. Patient denies smoking.  HX copd diabetes hypertension sleep apnea and chf

## 2019-01-19 LAB
ANION GAP SERPL CALC-SCNC: 10 MMOL/L (ref 3–18)
BASOPHILS # BLD: 0 K/UL (ref 0–0.1)
BASOPHILS NFR BLD: 0 % (ref 0–2)
BUN SERPL-MCNC: 16 MG/DL (ref 7–18)
BUN/CREAT SERPL: 15 (ref 12–20)
CALCIUM SERPL-MCNC: 9.2 MG/DL (ref 8.5–10.1)
CHLORIDE SERPL-SCNC: 103 MMOL/L (ref 100–108)
CO2 SERPL-SCNC: 25 MMOL/L (ref 21–32)
CREAT SERPL-MCNC: 1.1 MG/DL (ref 0.6–1.3)
DIFFERENTIAL METHOD BLD: ABNORMAL
EOSINOPHIL # BLD: 0 K/UL (ref 0–0.4)
EOSINOPHIL NFR BLD: 0 % (ref 0–5)
ERYTHROCYTE [DISTWIDTH] IN BLOOD BY AUTOMATED COUNT: 13.6 % (ref 11.6–14.5)
GLUCOSE BLD STRIP.AUTO-MCNC: 321 MG/DL (ref 70–110)
GLUCOSE BLD STRIP.AUTO-MCNC: 322 MG/DL (ref 70–110)
GLUCOSE BLD STRIP.AUTO-MCNC: 343 MG/DL (ref 70–110)
GLUCOSE BLD STRIP.AUTO-MCNC: 361 MG/DL (ref 70–110)
GLUCOSE BLD STRIP.AUTO-MCNC: 392 MG/DL (ref 70–110)
GLUCOSE SERPL-MCNC: 341 MG/DL (ref 74–99)
HCT VFR BLD AUTO: 39.4 % (ref 35–45)
HGB BLD-MCNC: 13.4 G/DL (ref 12–16)
LYMPHOCYTES # BLD: 1 K/UL (ref 0.9–3.6)
LYMPHOCYTES NFR BLD: 9 % (ref 21–52)
MAGNESIUM SERPL-MCNC: 2 MG/DL (ref 1.6–2.6)
MCH RBC QN AUTO: 30.1 PG (ref 24–34)
MCHC RBC AUTO-ENTMCNC: 34 G/DL (ref 31–37)
MCV RBC AUTO: 88.5 FL (ref 74–97)
MONOCYTES # BLD: 0.1 K/UL (ref 0.05–1.2)
MONOCYTES NFR BLD: 1 % (ref 3–10)
NEUTS SEG # BLD: 10.6 K/UL (ref 1.8–8)
NEUTS SEG NFR BLD: 90 % (ref 40–73)
PHOSPHATE SERPL-MCNC: 3.1 MG/DL (ref 2.5–4.9)
PLATELET # BLD AUTO: 316 K/UL (ref 135–420)
PMV BLD AUTO: 9.1 FL (ref 9.2–11.8)
POTASSIUM SERPL-SCNC: 4.2 MMOL/L (ref 3.5–5.5)
RBC # BLD AUTO: 4.45 M/UL (ref 4.2–5.3)
SODIUM SERPL-SCNC: 138 MMOL/L (ref 136–145)
WBC # BLD AUTO: 11.7 K/UL (ref 4.6–13.2)

## 2019-01-19 PROCEDURE — 74011000250 HC RX REV CODE- 250: Performed by: STUDENT IN AN ORGANIZED HEALTH CARE EDUCATION/TRAINING PROGRAM

## 2019-01-19 PROCEDURE — 77010033678 HC OXYGEN DAILY

## 2019-01-19 PROCEDURE — 97116 GAIT TRAINING THERAPY: CPT

## 2019-01-19 PROCEDURE — 74011250636 HC RX REV CODE- 250/636: Performed by: STUDENT IN AN ORGANIZED HEALTH CARE EDUCATION/TRAINING PROGRAM

## 2019-01-19 PROCEDURE — 36415 COLL VENOUS BLD VENIPUNCTURE: CPT

## 2019-01-19 PROCEDURE — 97161 PT EVAL LOW COMPLEX 20 MIN: CPT

## 2019-01-19 PROCEDURE — 83735 ASSAY OF MAGNESIUM: CPT

## 2019-01-19 PROCEDURE — 94761 N-INVAS EAR/PLS OXIMETRY MLT: CPT

## 2019-01-19 PROCEDURE — 74011636637 HC RX REV CODE- 636/637: Performed by: STUDENT IN AN ORGANIZED HEALTH CARE EDUCATION/TRAINING PROGRAM

## 2019-01-19 PROCEDURE — 74011250637 HC RX REV CODE- 250/637: Performed by: STUDENT IN AN ORGANIZED HEALTH CARE EDUCATION/TRAINING PROGRAM

## 2019-01-19 PROCEDURE — 77030018846 HC SOL IRR STRL H20 ICUM -A

## 2019-01-19 PROCEDURE — 65270000029 HC RM PRIVATE

## 2019-01-19 PROCEDURE — 84100 ASSAY OF PHOSPHORUS: CPT

## 2019-01-19 PROCEDURE — 85025 COMPLETE CBC W/AUTO DIFF WBC: CPT

## 2019-01-19 PROCEDURE — 80048 BASIC METABOLIC PNL TOTAL CA: CPT

## 2019-01-19 PROCEDURE — 94640 AIRWAY INHALATION TREATMENT: CPT

## 2019-01-19 PROCEDURE — 74011250637 HC RX REV CODE- 250/637: Performed by: INTERNAL MEDICINE

## 2019-01-19 PROCEDURE — 82962 GLUCOSE BLOOD TEST: CPT

## 2019-01-19 PROCEDURE — 74011636637 HC RX REV CODE- 636/637: Performed by: FAMILY MEDICINE

## 2019-01-19 RX ORDER — GUAIFENESIN 600 MG/1
1200 TABLET, EXTENDED RELEASE ORAL EVERY 12 HOURS
Status: DISCONTINUED | OUTPATIENT
Start: 2019-01-19 | End: 2019-01-21 | Stop reason: HOSPADM

## 2019-01-19 RX ADMIN — INSULIN GLARGINE 30 UNITS: 100 INJECTION, SOLUTION SUBCUTANEOUS at 01:48

## 2019-01-19 RX ADMIN — IPRATROPIUM BROMIDE AND ALBUTEROL SULFATE 3 ML: .5; 3 SOLUTION RESPIRATORY (INHALATION) at 03:16

## 2019-01-19 RX ADMIN — GUAIFENESIN 1200 MG: 600 TABLET, EXTENDED RELEASE ORAL at 21:38

## 2019-01-19 RX ADMIN — GUAIFENESIN 1200 MG: 600 TABLET, EXTENDED RELEASE ORAL at 12:08

## 2019-01-19 RX ADMIN — HEPARIN SODIUM 5000 UNITS: 5000 INJECTION INTRAVENOUS; SUBCUTANEOUS at 12:08

## 2019-01-19 RX ADMIN — INSULIN LISPRO 12 UNITS: 100 INJECTION, SOLUTION INTRAVENOUS; SUBCUTANEOUS at 17:33

## 2019-01-19 RX ADMIN — INSULIN LISPRO 4 UNITS: 100 INJECTION, SOLUTION INTRAVENOUS; SUBCUTANEOUS at 08:54

## 2019-01-19 RX ADMIN — HYDROCHLOROTHIAZIDE 12.5 MG: 25 TABLET ORAL at 08:41

## 2019-01-19 RX ADMIN — INSULIN LISPRO 8 UNITS: 100 INJECTION, SOLUTION INTRAVENOUS; SUBCUTANEOUS at 12:08

## 2019-01-19 RX ADMIN — LISINOPRIL 20 MG: 20 TABLET ORAL at 08:41

## 2019-01-19 RX ADMIN — Medication 10 ML: at 16:53

## 2019-01-19 RX ADMIN — Medication 10 ML: at 06:39

## 2019-01-19 RX ADMIN — ASPIRIN 81 MG: 81 TABLET, COATED ORAL at 08:41

## 2019-01-19 RX ADMIN — INSULIN GLARGINE 30 UNITS: 100 INJECTION, SOLUTION SUBCUTANEOUS at 21:39

## 2019-01-19 RX ADMIN — IPRATROPIUM BROMIDE AND ALBUTEROL SULFATE 3 ML: .5; 3 SOLUTION RESPIRATORY (INHALATION) at 12:15

## 2019-01-19 RX ADMIN — HEPARIN SODIUM 5000 UNITS: 5000 INJECTION INTRAVENOUS; SUBCUTANEOUS at 01:48

## 2019-01-19 RX ADMIN — MONTELUKAST SODIUM 10 MG: 10 TABLET, FILM COATED ORAL at 01:48

## 2019-01-19 RX ADMIN — HEPARIN SODIUM 5000 UNITS: 5000 INJECTION INTRAVENOUS; SUBCUTANEOUS at 23:07

## 2019-01-19 RX ADMIN — IPRATROPIUM BROMIDE AND ALBUTEROL SULFATE 3 ML: .5; 3 SOLUTION RESPIRATORY (INHALATION) at 19:22

## 2019-01-19 RX ADMIN — LEVOFLOXACIN 750 MG: 5 INJECTION, SOLUTION INTRAVENOUS at 08:54

## 2019-01-19 RX ADMIN — IPRATROPIUM BROMIDE AND ALBUTEROL SULFATE 3 ML: .5; 3 SOLUTION RESPIRATORY (INHALATION) at 07:37

## 2019-01-19 RX ADMIN — MONTELUKAST SODIUM 10 MG: 10 TABLET, FILM COATED ORAL at 21:38

## 2019-01-19 RX ADMIN — LISINOPRIL 20 MG: 20 TABLET ORAL at 17:34

## 2019-01-19 RX ADMIN — PANTOPRAZOLE SODIUM 40 MG: 40 TABLET, DELAYED RELEASE ORAL at 08:41

## 2019-01-19 RX ADMIN — INSULIN LISPRO 15 UNITS: 100 INJECTION, SOLUTION INTRAVENOUS; SUBCUTANEOUS at 21:39

## 2019-01-19 RX ADMIN — Medication 10 ML: at 23:08

## 2019-01-19 RX ADMIN — IPRATROPIUM BROMIDE AND ALBUTEROL SULFATE 3 ML: .5; 3 SOLUTION RESPIRATORY (INHALATION) at 15:59

## 2019-01-19 RX ADMIN — PREDNISONE 40 MG: 20 TABLET ORAL at 08:41

## 2019-01-19 RX ADMIN — IPRATROPIUM BROMIDE AND ALBUTEROL SULFATE 3 ML: .5; 3 SOLUTION RESPIRATORY (INHALATION) at 23:23

## 2019-01-19 RX ADMIN — Medication 10 ML: at 02:18

## 2019-01-19 NOTE — PROGRESS NOTES
[facility]  Intern Progress Note    Patient: Leyda Sal MRN: [id number]   SSN: [id number]  YOB: 1960   Age: 60 y.o. Sex: female      Admit Date: 1/18/2019    LOS: 1 day   Chief Complaint   Patient presents with    Shortness of Breath       Subjective:     Pt seen at bedside. No acute events overnight aside from elevated blood glucose. Per patient, \"I just want to feel better so I can go to [facility] with my granddaughter in [place]". She used her CPAP overnight and there has been no deterioration in her breathing. In agreement with oral steroids instead of IV methylprednisolone. No relief from her cough. OOB w/o assistance to bathroom. Review of Systems   Constitutional: Positive for malaise/fatigue. Negative for chills and fever. Respiratory: Positive for cough, shortness of breath and wheezing (improved). Cardiovascular: Negative for chest pain and palpitations. Gastrointestinal: Positive for abdominal pain (chronic) and heartburn. Negative for nausea and vomiting. Genitourinary: Negative for dysuria and urgency. Musculoskeletal: Negative for joint pain and myalgias. Neurological: Positive for headaches. Negative for dizziness. Psychiatric/Behavioral: Negative for depression. The patient is not nervous/anxious. Objective:     Visit Vitals  /58 (BP 1 Location: Left arm, BP Patient Position: At rest)   Pulse 86   Temp 97.1 °F (36.2 °C)   Resp 24   Ht 5' 3\" (1.6 m)   Wt 113.3 kg (249 lb 12.5 oz)   SpO2 92%   Breastfeeding? No   BMI 44.25 kg/m²       Physical Exam:   General appearance: AAO, NAD, appears older than stated age, elderly  female, obese  Lungs: NC in place. Normal WOB. Diffuse wheezes heard b/l, improved since last night. No focal consolidations. Equal expansion bilaterally. Heart: RRR, no murmurs. No visible pulsations or thrills. Abdomen:Soft, mild RUQ tenderness, nondistended. BS (+). Umbilical hernia. Exremities: No clubbing/cyanosis/edema. Psych: Mood/affect congruent. Intake and Output:  Current Shift: 01/18 1901 - 01/19 0700  In: -   Out: 400 [Urine:400]  Last three shifts: No intake/output data recorded. Lab/Data Review:  Recent Results (from the past 12 hour(s))   GLUCOSE, POC    Collection Time: 01/18/19 11:05 PM   Result Value Ref Range    Glucose (POC) 269 (H) 70 - 110 mg/dL   GLUCOSE, POC    Collection Time: 01/19/19  1:47 AM   Result Value Ref Range    Glucose (POC) 343 (H) 70 - 110 mg/dL       Key Findings or tests:   DATE TEST RESULT OR IMPRESSION   01/14/2019 Sputum Cx Few SERRATIA MARCESCENS   01/18/2019 CXR Official read pending  By my interpretation, no interval change from CXR on 1/10/2019 as per H&P                    Telemetry NONE   Oxygen 2L NC, CPAP     Assessment and Plan:     60 y.o. female with PMH COPD on home O2, HTN, T2DM, obesity, Diastolic CHF, SHERRIE now admitted with COPD exacerbation.     1. COPD with Acute Exacerbation. H/O Moderate COPD, GOLD 2 (FEV1/FVC1 79% on PFTs 11/04/2016). On home O2 and 6 admissions this past year for COPD exacerbations. Vitals on presentation reassuring as was initial lab work. No fever, no leukocytosis or metabolic derrangements. Recent sputum cx grew Serratia marcescens and sensitivities reviewed. Repeat sputum cx pending.   - Continue Duonebs q4h per RT  - Continue Prednisone 40 mg po daily for five day burst (1/19/2019-1/24/2019)  - Continue Levaquin 750 mg IV every day until 1/22/2019 for total duration of antibiotic therapy of 7 days (1/16/2019-1/22/2019)  - Continue supplemental O2 with NC and CPAP overnight  - Titrate SpO2> 92%  - Continue Singluar 10 mg QD  - Hold Advair 1 puff BID  - Hold Spiriva 1.25 mcg/act 2 puffs daily  - Daily BMP, CBC, mag/phos  - F/U sputum culture and gram stain  - promethazine prn cough  - PT/OT/CM  - Reccomend PCV13 vaccine as outpatient     2. H/O Diastolic CHF with preserved EF.  No s/sx heart failure this admission. ECHO 7/2018 EF 66-70% with no wma. LVH. - telemetry monitoring  - continue aspirin 81 mg po daily  - continue lisinopril 20 mg po bid     3. H/O HTN. BPs overnight 111-142/58-83. - Vital signs per unit routing  - Continue lisinopril 20 mg po bid  - Continue hctz 12.5 mg po daily     4. H/O T2DM. BG overnight up to 343. Hemoglobin A1c 8.0.  - Accuchecks achs with sliding scale insulin for BG>220, potentially titrate sliding scale up if patient's ac checks remain higher today  - Continue home Lantus 30U qhs  - Consistent carbohydrate diet     5. H/O SHERRIE  - CPAP qhs     6.  H/O GERD  - Continue home omeprazole 40 mg po daily   - Continue home famotidine 20 mg po bid prn reflux  - Continue home simethicone prn abdominal distention and gas reflief    Diet  Diabetic   DVT Prophylaxis  SQH q12h   GI Prophylaxis  PPI   Code status  FULL   Disposition  Anticipate LOS>2 midnights to home with office f/u      Point of Contact Kain Loya  Relationship: Daughter  [phone]      María Elena DO Magan, PGY-1   [address]   Intern Pager: [phone]   January 19, 2019, 06:57AM

## 2019-01-19 NOTE — PROGRESS NOTES
Problem: Falls - Risk of  Goal: *Absence of Falls  Document Ricarda Fall Risk and appropriate interventions in the flowsheet.   Outcome: Progressing Towards Goal  Fall Risk Interventions:            Medication Interventions: Assess postural VS orthostatic hypotension, Evaluate medications/consider consulting pharmacy, Patient to call before getting OOB, Teach patient to arise slowly         History of Falls Interventions: Bed/chair exit alarm, Consult care management for discharge planning, Door open when patient unattended, Evaluate medications/consider consulting pharmacy, Investigate reason for fall, Room close to nurse's station

## 2019-01-19 NOTE — ED NOTES
Patient states she is feeling better. She is coughing slightly productive. She states she has been coughing and spiting x1 month. Patient has been changed into a gown and placed on cardiac monitoring equipment. Will continue to monitor.

## 2019-01-19 NOTE — PROGRESS NOTES
conducted an initial consultation and Spiritual Assessment for Kingston Rodríguez, who is a 60 y.o.,female. Patients Primary Language is: [place]. According to the patients EMR Oriental orthodox Affiliation is: Zaid Patel.     The reason the Patient came to the hospital is:   Patient Active Problem List    Diagnosis Date Noted    Atelectasis of right lung 12/14/2018    Acute exacerbation of chronic obstructive pulmonary disease (COPD) ([address] 10/07/2018    Bilateral carotid bruits 07/30/2018    Palpitations 07/30/2018    Type 2 diabetes with nephropathy ([address] 05/30/2018    Hyperlipidemia 01/24/2018    COPD with acute bronchitis ([address] 15/00/4946    Diastolic CHF, chronic ([address] 02/09/2017    Diverticulosis 02/09/2017    COPD exacerbation ([address] 02/08/2017    Acute exacerbation of COPD with asthma ([address] 02/08/2017    Obesity, Class III, BMI 40-49.9 (morbid obesity) ([address] 08/09/2016    Chest pain 08/09/2016    Dyspnea 08/09/2016    COPD with acute exacerbation ([address] 05/24/2015    COPD (chronic obstructive pulmonary disease) (HCC) 03/27/2015    Allergic rhinitis 03/27/2015    Essential hypertension, benign 09/30/2012    Diabetes mellitus, type 2 ([address] 09/30/2012    Esophageal reflux 09/30/2012    SHERRIE on CPAP         The  provided the following Interventions:  Initiated a relationship of care and support. Explored issues of scout, belief, spirituality and Pentecostal/ritual needs while hospitalized. Listened empathically. Provided chaplaincy education. Provided information about Spiritual Care Services. Offered prayer and assurance of continued prayers on patient's behalf. Chart reviewed. The following outcomes where achieved:  Patient shared limited information about both their medical narrative and spiritual journey/beliefs.  confirmed Patient's Oriental orthodox Affiliation. Patient processed feeling about current hospitalization.   Patient expressed gratitude for 's visit. Assessment:  Patient does not have any Baptism/cultural needs that will affect patients preferences in health care. There are no spiritual or Baptism issues which require intervention at this time. Plan:  Chaplains will continue to follow and will provide pastoral care on an as needed/requested basis.  recommends bedside caregivers page  on duty if patient shows signs of acute spiritual or emotional distress.     Chaplain Resident [address] [place] [address]   [phone]

## 2019-01-19 NOTE — PROGRESS NOTES
Problem: Mobility Impaired (Adult and Pediatric)  Goal: *Acute Goals and Plan of Care (Insert Text)  Physical Therapy Goals  Initiated 1/19/2019 and to be accomplished within 7 day(s)  1. Patient will move from supine to sit and sit to supine , scoot up and down and roll side to side in bed with modified independence. 2.  Patient will transfer from bed to chair and chair to bed with modified independence using the least restrictive device. 3.  Patient will perform sit to stand with modified independence. 4.  Patient will ambulate with supervision/set-up for >150 feet with 1 standing rest break. 5.  Patient will ascend/descend 4-10 stairs with 1 handrail(s) with supervision/set-up. physical Therapy EVALUATION    Patient: Amebr Zelaya (32 y.o. female)  Date: 1/19/2019  Primary Diagnosis: Acute exacerbation of chronic obstructive pulmonary disease (COPD) ([facility])  Dyspnea  Acute exacerbation of chronic obstructive pulmonary disease (COPD) ([facility]       Precautions: Fall     ASSESSMENT :  Patient presents today alert and agreeable to therapy and was sitting on EOB with NC O2 donned upon arrival. Patient reports she has been getting up to bathroom and feels better than when she arrived to hospital. Patient doffed NC as she reports wearing it at home PRN and Sao2 at rest is 95%. Patient ambulated total 150ft no AD and required standing rest break appx every 50ft when SaO2 reached 91%. Patient recovered within 30-45s to 94% and reports HH PT has helped her be more mindful of her recovery breathing. Patient then transferred to sitting EOB in room upon conclusion of gait and was educated on role and goals of therapy. Patient is able to walk further at baseline before needing rest break. Also spoke with patient about possible Rollator for longer distance mobility and directed patient to ask outpatient physician for possible order for this.  Patient is motivated to participate in therapy dn will benefit to increased endurance. Patient will benefit from skilled intervention to address the above impairments. Patients rehabilitation potential is considered to be Good  Factors which may influence rehabilitation potential include:   []         None noted  []         Mental ability/status  [x]         Medical condition  [x]         Home/family situation and support systems  []         Safety awareness  []         Pain tolerance/management  []         Other:      PLAN :  Recommendations and Planned Interventions:  [x]           Bed Mobility Training             [x]    Neuromuscular Re-Education  [x]           Transfer Training                   []    Orthotic/Prosthetic Training  [x]           Gait Training                          []    Modalities  [x]           Therapeutic Exercises          []    Edema Management/Control  [x]           Therapeutic Activities            [x]    Patient and Family Training/Education  []           Other (comment):    Frequency/Duration: Patient will be followed by physical therapy 1-2 times per day/4-7 days per week to address goals. Discharge Recommendations: Home Health  Further Equipment Recommendations for Discharge: N/A       G-CODES     Eval Complexity: History: MEDIUM  Complexity : 1-2 comorbidities / personal factors will impact the outcome/ POC Exam:LOW Complexity : 1-2 Standardized tests and measures addressing body structure, function, activity limitation and / or participation in recreation  Presentation: LOW Complexity : Stable, uncomplicated  Clinical Decision Making:Low Complexity   Overall Complexity:LOW     SUBJECTIVE:   Patient stated I want to get better and go to [facility] in August with my granddaughter.     OBJECTIVE DATA SUMMARY:     Past Medical History:   Diagnosis Date    Asthma     Chronic lung disease     COPD     Cystocele, midline     Diabetes mellitus ([facility] [address]     GERD (gastroesophageal reflux disease)     Hidradenitis suppurativa     Hyperlipidemia     Hypertension     SHERRIE on CPAP     CPAP    Stress incontinence      Past Surgical History:   Procedure Laterality Date    BREAST SURGERY PROCEDURE UNLISTED      Right breast benign tumor removal    HX APPENDECTOMY      HX CHOLECYSTECTOMY      HX DILATION AND CURETTAGE      Dysfunctional uterine bleeding, thought 2/2 fibroids    HX TUBAL LIGATION       Barriers to Learning/Limitations: None  Compensate with: N/A  Prior Level of Function/Home Situation: Patient lives with daughter and grand kids in 2 story home with bathroom upstairs. Patient notes she has BSC, SC in a step over tub/shower, and a RW. Patient used RW for outside the house and was independent with mobility inside the home. She wears NC O2 PRN during the day and CPAP at night. She reports that she have PCA from 9a-1p five days per week. Home Situation  Home Environment: Other (comment)(duplex)  # Steps to Enter: 2  One/Two Story Residence: Two story  # of Interior Steps: 14  Height of Each Step (in): 7 inches  Interior Rails: Right  Lift Chair Available: No  Living Alone: No  Support Systems: Family member(s)  Patient Expects to be Discharged to[de-identified] Other (comment)(duplex)  Current DME Used/Available at Home: CPAP, Glucometer, Blood pressure cuff, Commode, bedside, Walker, rolling, Transfer bench, Shower chair, Oxygen, portable  Critical Behavior:    A&Ox4  Strength:    Strength:  Within functional limits(BLE)   Tone & Sensation:   Tone: Normal(BLE)   Sensation: Intact(BLE)    Range Of Motion:  AROM: Within functional limits(BLE)   Functional Mobility:   Transfers:  Sit to Stand: Modified independent  Stand to Sit: Modified independent   Balance:   Sitting: Intact  Standing: Intact  Ambulation/Gait Training:  Distance (ft): 150 Feet (ft)(3 standing rest breaks apprx every 50ft)   Ambulation - Level of Assistance: Supervision   Base of Support: Widened  Speed/Pam: Slow  Interventions: Verbal cues  Pain:  Pt reports 0/10 pain or discomfort prior to treatment.    Pt reports 0/10 pain or discomfort post treatment. Activity Tolerance:   Patient tolerated activity well; SaO2 on room air 95% at rest and drops to 91% after apprx 50ft of walking and recovers to 94% within 30-45 seconds. Please refer to the flowsheet for vital signs taken during this treatment. After treatment:   []         Patient left in no apparent distress sitting up in chair  [x]         Patient left in no apparent distress in bed  [x]         Call bell left within reach  []         Nursing notified  []         Caregiver present  []         Bed alarm activated  []         SCDs in place to B LE     COMMUNICATION/EDUCATION:   [x]         Fall prevention education was provided and the patient/caregiver indicated understanding. [x]         Patient/family have participated as able in goal setting and plan of care. [x]         Patient/family agree to work toward stated goals and plan of care. []         Patient understands intent and goals of therapy, but is neutral about his/her participation. []         Patient is unable to participate in goal setting and plan of care.     Thank you for this referral.  Daiana Houser, PT   Time Calculation: 26 mins

## 2019-01-19 NOTE — PROGRESS NOTES
Respiratory Care Assessment for Bronchial hygiene or Lung Expansion Therapy  Patient  Colleen Palomares     60 y.o.   female     1/19/2019  12:19 PM  Patient Active Problem List   Diagnosis Code    Essential hypertension, benign I10    [facility] E11.9    Esophageal reflux K21.9    SHERRIE on CPAP G47.33, Z99.89    COPD (chronic obstructive pulmonary disease) ([facility]) J44.9    Allergic rhinitis J30.9    COPD with acute exacerbation ([facility]) J44.1    Obesity, Class III, BMI 40-49.9 (morbid obesity) ([facility]) E66.01    Chest pain R07.9    Dyspnea R06.00    COPD exacerbation ([facility]) J44.1    Acute exacerbation of COPD with asthma ([facility]) J44.1, O28.974    Diastolic CHF, chronic ([facility]) I50.32    Diverticulosis K57.90    COPD with acute bronchitis ([facility]) J44.0, J20.9    Hyperlipidemia E78.5    Type 2 diabetes with nephropathy ([facility]) E11.21    Bilateral carotid bruits R09.89    Palpitations R00.2    Acute exacerbation of chronic obstructive pulmonary disease (COPD) ([facility]) J44.1    Atelectasis of right lung J98.11       ABG:  Date:1/19/2019  No results found for: PH, PHI, PCO2, PCO2I, PO2, PO2I, HCO3, HCO3I, FIO2, FIO2I    Chest X-ray:  Date:1/19/2019  Results from Hospital Encounter encounter on 01/18/19   XR CHEST SNGL V    Narrative PA CHEST    CPT CODE: 62911    INDICATION: Shortness of breath. COPD exacerbation. .    COMPARISON: 1/10/2019 and 6/2/2018 PA and lateral.    TECHNIQUE: Single PA chest radiograph is reviewed. FINDINGS:    Mild streaky bibasilar opacities similar to prior studies, likely atelectasis  and/or scar. No evidence of pneumothorax. The costophrenic sulci appear sharp. The cardiac silhouette is upper normal in size. No acute osseous abnormalities identified. Impression IMPRESSION:     Mild streaky bibasilar opacities, likely atelectasis and/or scar.         Lab Test:  Date:1/19/2019  WBC:   Lab Results   Component Value Date/Time    WBC 11.7 01/19/2019 06:51 AM   HGB:   Lab Results   Component Value Date/Time    HGB 13.4 01/19/2019 06:51 AM    PLTS:   Lab Results   Component Value Date/Time    PLATELET 252 00/02/3079 06:51 AM       SaO2%/flow: @UTBWUGP3(0)@    Vital Signs:     Patient Vitals for the past 8 hrs:   Temp Pulse Resp BP SpO2   01/19/19 1217 -- -- -- -- 98 %   01/19/19 1115 97.9 °F (36.6 °C) 90 22 147/81 95 %   01/19/19 0829 97.7 °F (36.5 °C) 92 23 134/76 93 %   01/19/19 0739 -- -- -- -- 98 %   01/19/19 0514 97.1 °F (36.2 °C) 86 24 111/58 92 %         RA/O2 flow/device:2L NC       First Inital Assesment:     [x]Yes []No   Reevaluation/Reassessment:    []Yes [x]No     CHART REVIEW   Points 0 X 1 X 2 X 3 X 4 X Points   Pulmonary History Smoking History (1) none  Recent Smoking History <1 PPD  Recent Smoking History >1 PPD  Pulmonary Disease or Impairment  Severe Pulmonary Disease  3   Surgical History No Surgery  General Surgery  Lower Abdominal  Thoracic or Upper Abdominal  Thoracic & Pulmonary Disease  0   CXR Clear or not indicated  Chronic changes or CXR Pending  Infiltrate, atelectasis or pleural effusions  Infiltrates in more than 1lobe  Infiltrates +atelectasis  +/or pleural effusions  1     PATIENT ASSESSMENT    0 X 1 X 2 X 3 X 4 X Points   Respiratory Pattern Regular pattern RR 12-20  Increased RR 21-27   Mild Dyspnea at rest, irregular pattern RR 28-32  Moderate Dyspnea at rest, Use of accessory muscles, RR 33-36  Severe Dyspnea, Use of accessory muscles RR >36  0   Mental Status Alert Oriented cooperative  Confused, Follows commands  Lethargic, Does not follow commands  Obtunded  Unresponsive  0   Breath Sounds Clear  Decreased Unilaterally  Decreased Bilaterally  Mild Scattered wheezing or Crackles in bases  Severe Wheezing or rhonchi  2   Cough Strong dry NPC  Strong Productive  Weak NPC  Weak productive or weak with rhonchi  No cough or may require suctioning  0   Level of Activity Ambulatory  Ambulatory with assistance  Temporarily Non-ambulatory  Non-Ambulatory, able to position self  Unable to position self, confined to bed  0   Total Points/Score:   6     Specific Intervention Chart()    Bronchial Hygiene/Secretion Clearance:    []EZPAP []Rotation bed with vibration    []CPT with percussor []CPT via vest   [x]Oscillastiang positive pressure expiratory device      Lung Expansion:    []Incentive Spirometer w/RT visits []Incentive Spirometer w/nursing    []EZPAP      *Suctioning:    []Nasal Tracheal []Tracheal     *suctioning will be ordered and done PRN with an associated frequency such as QID/PRN based on score       Other:    Care Plan   Level # Score Modality Frequency Comment   Level 1 >17      Level 2 14-17      Level 3 10-13      Level 4 1-9 Aerobika PRN      BRONCHIAL HYGIENE SCORING AND FREQUENCY GUIDELINES   Frequency Indications/Findings Level #   Q4 ATC Copious secretions, SOB, unable to sleep 1   QID & PRN at night Moderate amounts of secretions 2   TID or Q6 wa Small amounts of secretions and poor cough: recent history of secretions 3   BID or Q8 wa Unable to deep breathe and cough effectively 4        Comments:  Aerobika PRN to help mobilize and clear secretions    Respiratory Therapist: Desean Callahan, RT

## 2019-01-19 NOTE — ROUTINE PROCESS
TRANSFER - IN REPORT:    Verbal report received from [facility]. RN(name) on Debra Luis  being received from ER(unit) for routine progression of care      Report consisted of patients Situation, Background, Assessment and   Recommendations(SBAR). Information from the following report(s) SBAR, Kardex, ED Summary, Intake/Output, MAR, Recent Results and Cardiac Rhythm SR was reviewed with the receiving nurse. Opportunity for questions and clarification was provided. Assessment completed upon patients arrival to unit and care assumed. Primary Nurse Franklin Blair RN and Burgos RN performed a dual skin assessment on this patient No impairment noted  Viktor score is 20    Bedside and Verbal shift change report given to [facility] (oncoming nurse) by me (offgoing nurse).  Report included the following information SBAR, Kardex, ED Summary, Intake/Output, MAR, Recent Results and Cardiac Rhythm SR.

## 2019-01-19 NOTE — H&P
Intern Admission History and Physical  [facility]      Patient: Jayleen Nguyễn MRN: [id number]  [facility]: [id number]    YOB: 1960  Age: 60 y.o. Sex: female      Admission Date: 1/18/2019       HPI:     Jayleen Nguyễn is a 60 y.o. female with PMH COPD on home O2, HTN, T2DM, obesity, Diastolic CHF, SHERRIE, now presenting with complaint of cough and increasing dyspnea. Patient has had 6 hospitalizations for COPD exacerbations over the past year. She has not had to be intubated. Patient describes the course of this exacerbation as different in that it started with a (presumed) viral illness. In December, patient saw Dr. Rosa Isela Rosario at [facility] when she had complaints of rhinorrhea and sinus congestion for which she was begun on Augmentin. Patient felt better after being on the augment for three days but then began to feel worse again. On January 2nd, she was seen by Dr. Janna Villagomez at [facility] where she continued to have URI symptoms and was flu negative. She saw her pulmonologist, Dr. Fani Link, several days later and was given a five day course of steroids and a sputum culture was obtained. She continued to deteriorate and recently sought care at the ED where she was prescribed doxycyline. She took doxy for three days before the results of her sputum culture grew out Serratia marcescens and Dr. Fani Link switched her to levaquin. She has taken levaquin for the past two days. She continues to have a voracious cough that has caused her to become quite fatigued. Her sputum over this time period has changed from white to a light green to a dark green and is now yellow. She has needed her home O2 ATC whereas she would typically only use it with exertional activities. She has been using her rescue inhaler and neb treatments with increased frequency. She says earlier today she was saturating 91% on RA and approximately 96% on 2L oxygen.      On presentation to the ED, she was afebrile, hypertensive with SBP in the 180s and tachypneic on RA but saturating well. She received several neb treatments, a 125 mg solu-medrol injection and her magnesium was repleted. She saturated well on 2L NC and her BPs have been stable in the 120s-150s. An official CXR read is pending but largely unremarkable. She has not required BiPAP/CPAP and an abg was not drawn. CBC/CMP largely unremarkable aside from hyperglycemia. Review of Systems   Constitutional: Positive for malaise/fatigue. Negative for chills and fever. HENT: Positive for congestion, sinus pain and sore throat. Eyes: Negative for double vision and photophobia. Respiratory: Positive for cough, sputum production, shortness of breath and wheezing. Negative for hemoptysis. Cardiovascular: Positive for chest pain. Negative for palpitations and leg swelling. Gastrointestinal: Positive for abdominal pain, heartburn and nausea. Negative for constipation, diarrhea and vomiting. Genitourinary: Negative for dysuria, hematuria and urgency. Musculoskeletal: Positive for myalgias. Negative for joint pain. Skin: Negative for itching and rash. Neurological: Negative for dizziness and headaches. Psychiatric/Behavioral: Negative for depression. The patient is not nervous/anxious.       Past Medical History:   Diagnosis Date    Asthma     Chronic lung disease     COPD     Cystocele, midline     Diabetes mellitus ([address]     GERD (gastroesophageal reflux disease)     Hidradenitis suppurativa     Hyperlipidemia     Hypertension     SHERRIE on CPAP     CPAP    Stress incontinence      Past Surgical History:   Procedure Laterality Date    BREAST SURGERY PROCEDURE UNLISTED      Right breast benign tumor removal    HX APPENDECTOMY      HX CHOLECYSTECTOMY      HX DILATION AND CURETTAGE      Dysfunctional uterine bleeding, thought 2/2 fibroids    HX TUBAL LIGATION       Family History   Problem Relation Age of Onset    Hypertension Mother    [facility] Stroke Mother     Breast Cancer Mother         Bilateral mastectomies    Cancer Mother         ovarian and breast    Heart Failure Mother     Heart Attack Father         2011    Heart Surgery Father         CABG    Heart Failure Father     COPD Sister         Heavy smoker    Cancer Sister         ovarian    Heart Failure Sister     Lung Disease Sister     Asthma Child     Cancer Maternal Aunt         pancreatic    Cancer Maternal Grandfather         stomach     Social History     Socioeconomic History    Marital status: LEGALLY      Spouse name: Not on file    Number of children: Not on file    Years of education: Not on file    Highest education level: Not on file   Tobacco Use    Smoking status: Former Smoker     Packs/day: 1.00     Years: 30.00     Pack years: 30.00     Types: Cigarettes     Start date: 1966     Last attempt to quit: 2006     Years since quittin.3    Smokeless tobacco: Never Used    Tobacco comment: 1-1.5 packs per day   Substance and Sexual Activity    Alcohol use: No    Drug use: No    Sexual activity: No     Allergies   Allergen Reactions    Ancef [Cefazolin] Hives    Contrast Agent [Iodine] Anaphylaxis, Shortness of Breath and Swelling     Needs pre-medication for IV contrast with Benadryl, Solu-Medrol    Fish Containing Products Anaphylaxis     Pt states she had a severe allergic reaction at 9 y/o.  Metformin Other (comments)     Abdominal pain, diarrhea.  Codeine Other (comments)     Altered mental status     Prior to Admission Medications   Prescriptions Last Dose Informant Patient Reported? Taking? NOVOLOG FLEXPEN U-100 INSULIN 100 unit/mL inpn   No No   Sig: Continue home Sliding scale insulin as prior to admission   Patient taking differently: If BG <100=0u  101-150=5u  151-250=8u  251-300=12u  >300 call MD   OXYGEN-AIR DELIVERY SYSTEMS   Yes No   Si L by Nasal route continuous.  [facility] Choice   albuterol (PROVENTIL HFA, VENTOLIN HFA, PROAIR HFA) 90 mcg/actuation inhaler   No No   Sig: Take 2 Puffs by inhalation every four (4) hours as needed for Wheezing. For the next 2 days after hospital discharge, use your inhaler 4 times a day. albuterol (PROVENTIL VENTOLIN) 2.5 mg /3 mL (0.083 %) nebulizer solution   No No   Sig: 3 mL by Nebulization route every four (4) hours as needed for Wheezing. Indications: BRONCHOSPASM PREVENTION, Chronic Obstructive Pulmonary Disease   albuterol sulfate 90 mcg/actuation aepb   No No   Sig: Take 1 Puff by inhalation every four (4) hours. albuterol-ipratropium (DUO-NEB) 2.5 mg-0.5 mg/3 ml nebu   No No   Sig: 3 mL by Nebulization route every six (6) hours as needed. aspirin delayed-release 81 mg tablet   Yes No   Sig: Take 81 mg by mouth daily. cpap machine kit   Yes No   Sig: by Does Not Apply route nightly. doxycycline (VIBRA-TABS) 100 mg tablet   No No   Sig: Take 1 Tab by mouth two (2) times a day for 7 days. famotidine (PEPCID) 20 mg tablet   Yes No   Sig: Take 20 mg by mouth two (2) times daily as needed for Other (reflux). fluticasone (FLONASE) 50 mcg/actuation nasal spray   Yes No   Si Sprays by Both Nostrils route daily. fluticasone-salmeterol (ADVAIR DISKUS) 100-50 mcg/dose diskus inhaler   No No   Sig: Take 1 Puff by inhalation every twelve (12) hours. fluticasone-salmeterol (ADVAIR HFA) 230-21 mcg/actuation inhaler   Yes No   Sig: Take 2 Puffs by inhalation two (2) times a day. hydroCHLOROthiazide (HYDRODIURIL) 12.5 mg tablet   Yes No   Sig: Take 12.5 mg by mouth daily. indapamide (LOZOL) 1.25 mg tablet   Yes No   Sig: Take 1.25 mg by mouth daily. insulin glargine (LANTUS,BASAGLAR) 100 unit/mL (3 mL) inpn   No No   Si Units by SubCUTAneous route nightly. ipratropium (ATROVENT) 0.02 % soln   No No   Si.5 mL by Nebulization route four (4) times daily as needed.  Indications: BRONCHOSPASM PREVENTION WITH COPD   Patient not taking: Reported on 2018 levoFLOXacin (LEVAQUIN) 500 mg tablet   No No   Sig: Take 1 Tab by mouth daily. lisinopril (PRINIVIL, ZESTRIL) 40 mg tablet   Yes No   Sig: Take 20 mg by mouth two (2) times a day. Indications: hypertension   montelukast (SINGULAIR) 10 mg tablet   Yes No   Sig: Take 10 mg by mouth nightly. omeprazole (PRILOSEC) 40 mg capsule   Yes No   Sig: Take 40 mg by mouth daily. Indications: gastroesophageal reflux disease   predniSONE (DELTASONE) 20 mg tablet   No No   Si tablets every morning with breakfast for 7 days   simethicone (MYLICON) 80 mg chewable tablet   Yes No   Sig: Take 80 mg by mouth every six (6) hours as needed for Flatulence. tiotropium bromide (SPIRIVA RESPIMAT) 2.5 mcg/actuation inhaler   Yes No   Sig: Take 2 Puffs by inhalation daily. Facility-Administered Medications: None     Physical Exam:     Patient Vitals for the past 24 hrs:   Temp Pulse Resp BP SpO2   19 1845 -- 93 22 148/57 98 %   19 1745 98.4 °F (36.9 °C) 92 20 186/82 95 %     Physical Exam:   General:  AAOx3, NAD, appears older than stated age, elderly  female, obese  HEENT: Conjunctiva pink, sclera anicteric. PERRLA. Pupils 3mm b/l. EOMI. Pharynx moist, nonerythematous. Moist mucous membranes. Thyroid not enlarged, no nodules. No cervical, supraclavicular, infraclavicular, occipital or submandibular lymphadenopathy. No other gross abnormalities present. CV:  RRR, no murmurs. No visible pulsations or thrills. RESP:  Deep breaths exacerbate cough. Normal WOB. Diffuse wheezes heard b/l, worse on R side. No focal consolidations. Minimal coarseness of breath sounds. Equal expansion bilaterally. ABD:  Soft, mild epigastric tenderness, nondistended. BS (+). Umbilical hernia. No hepatosplenomegaly. No suprapubic tenderness. MS:  No joint deformity or instability. No atrophy. Neuro:  CN II-XII grossly intact. 5/5 strength bilateral upper extremities and lower extremities. Ext:  No edema.   2+ radial and dp pulses bilaterally. Skin:  No rashes, lesions, or ulcers. Good turgor. Chemistry Recent Labs     01/18/19  1805   *      K 3.8      CO2 29   BUN 11   CREA 0.93   CA 9.2   AGAP 8   BUCR 12   AP 95   TP 7.4   ALB 3.7   GLOB 3.7   AGRAT 1.0        CBC w/Diff Recent Labs     01/18/19  1805   WBC 6.9   RBC 4.53   HGB 13.8   HCT 39.7      GRANS 60   LYMPH 33   EOS 2        Liver Enzymes Protein, total   Date Value Ref Range Status   01/18/2019 7.4 6.4 - 8.2 g/dL Final     Albumin   Date Value Ref Range Status   01/18/2019 3.7 3.4 - 5.0 g/dL Final     Globulin   Date Value Ref Range Status   01/18/2019 3.7 2.0 - 4.0 g/dL Final     A-G Ratio   Date Value Ref Range Status   01/18/2019 1.0 0.8 - 1.7   Final     AST (SGOT)   Date Value Ref Range Status   01/18/2019 25 15 - 37 U/L Final     Alk.  phosphatase   Date Value Ref Range Status   01/18/2019 95 45 - 117 U/L Final     Recent Labs     01/18/19  1805   TP 7.4   ALB 3.7   GLOB 3.7   AGRAT 1.0   SGOT 25   AP 95         on 1/10/2019     Cardiac Enzymes Lab Results   Component Value Date/Time    CPK 93 01/18/2019 06:05 PM    CKMB 1.3 01/18/2019 06:05 PM    CKND1 1.4 01/18/2019 06:05 PM    TROIQ <0.02 01/18/2019 06:05 PM        Micro Specimen Information: Sputum        Component Value Flag Ref Range Units Status   Special Requests:      Final   NO SPECIAL REQUESTS    GRAM STAIN 10-25 WBC/lpf      Final   GRAM STAIN <10 EPI/lpf      Final   GRAM STAIN      Final   MODERATE GRAM POSITIVE COCCI IN PAIRS IN GROUPS IN CHAINS    GRAM STAIN MUCUS PRESENT      Final   Culture result: (Abnormal)      Final   FEW SERRATIA MARCESCENS Abnormal     Culture result:      Final   MODERATE NORMAL RESPIRATORY TOAN    Susceptibility     Serratia marcescens     Antibiotic Interpretation Value Method Comment   Cefazolin ($) Resistant >=64 ug/mL JENNIFER    Cefepime ($$) Susceptible <=1 ug/mL JENNIFER    Ceftazidime ($) Susceptible <=1 ug/mL JENNIFER Ceftriaxone ($) Susceptible <=1 ug/mL JENNIFER    Ciprofloxacin ($) Susceptible <=0.25 ug/mL JENNIFER    Gentamicin ($) Susceptible <=1 ug/mL JENNIFER    Levofloxacin ($) Susceptible <=0.12 ug/mL JENNIFER    Tobramycin ($) Susceptible 2 ug/mL JENNIFER    Trimeth-Sulfamethoxa Susceptible <=20 ug/mL JENNIFER    Susceptibility     Serratia marcescens (1)     Antibiotic Interpretation Method Status    Cefazolin ($) Resistant JENNIFER Final    Cefepime ($$) Susceptible JENNIFER Final    Ceftazidime ($) Susceptible JENNIFER Final    Ceftriaxone ($) Susceptible JENNIFER Final    Ciprofloxacin ($) Susceptible JENNIFER Final    Gentamicin ($) Susceptible JENNIFER Final    Levofloxacin ($) Susceptible JENNIFER Final    Tobramycin ($) Susceptible JENNIFER Final    Trimeth-Sulfamethoxa Susceptible JENNIFER Final         Imaging:    XR (Most Recent). Results from [place] encounter on 01/10/19   XR CHEST PA LAT    Narrative EXAMINATION: Chest 2 views    INDICATION: Shortness of breath, wheezing    COMPARISON: 12/14/2018    FINDINGS: Frontal and lateral views of the chest obtained. No consolidation. Mediastinal silhouette normal in size. Slightly prominent perihilar and basilar  interstitium. No evidence of pneumothorax. No acute osseous findings. Impression IMPRESSION:    Mild perihilar and basilar interstitial infiltrate or edema. No consolidation. CXR 1/18/2019 pending  By my interpretation and in comparison to above, no acute changes. Cardiomegaly, not pronounced barrel chesting. No changes to mild perihilar and basilar edema. No focal consolidations. ECHO · Definity contrast was given to enhance imaging. · Left ventricular hyperdynamic systolic function. Estimated left ventricular ejection fraction is 66 - 70%. Left ventricular mild concentric hypertrophy observed. Normal left ventricular wall motion, no regional wall motion abnormality noted. Age-appropriate left ventricular diastolic function. · Right ventricle not well visualized.   · Aortic valve is probably trileaflet. EKG 1/18/2019  8:16 PM - David, Card Result In     Component Value Ref Range & Units Status   Ventricular Rate 84  BPM Final   Atrial Rate 84  BPM Final   P-R Interval 140  ms Final   QRS Duration 82  ms Final   Q-T Interval 362  ms Final   QTC Calculation (Bezet) 427  ms Final   Calculated P Axis 44  degrees Final   Calculated R Axis 23  degrees Final   Calculated T Axis 55  degrees Final   Diagnosis   Final   Normal sinus rhythm   Normal ECG   When compared with ECG of 10-JAMMIE-2019 12:38,   No significant change was found   Confirmed by David Williamson MD, Robb Davalos ([id number]) on 1/18/2019 8:16:35 PM           Recent Results (from the past 12 hour(s))   EKG, 12 LEAD, INITIAL    Collection Time: 01/18/19  5:56 PM   Result Value Ref Range    Ventricular Rate 84 BPM    Atrial Rate 84 BPM    P-R Interval 140 ms    QRS Duration 82 ms    Q-T Interval 362 ms    QTC Calculation (Bezet) 427 ms    Calculated P Axis 44 degrees    Calculated R Axis 23 degrees    Calculated T Axis 55 degrees    Diagnosis       Normal sinus rhythm  Normal ECG  When compared with ECG of 10-JAMMIE-2019 12:38,  No significant change was found  Confirmed by David Williamson MD, Willian ([id number]) on 1/18/2019 8:16:35 PM     CBC WITH AUTOMATED DIFF    Collection Time: 01/18/19  6:05 PM   Result Value Ref Range    WBC 6.9 4.6 - 13.2 K/uL    RBC 4.53 4.20 - 5.30 M/uL    HGB 13.8 12.0 - 16.0 g/dL    HCT 39.7 35.0 - 45.0 %    MCV 87.6 74.0 - 97.0 FL    MCH 30.5 24.0 - 34.0 PG    MCHC 34.8 31.0 - 37.0 g/dL    RDW 13.5 11.6 - 14.5 %    PLATELET 177 238 - 497 K/uL    MPV 9.1 (L) 9.2 - 11.8 FL    NEUTROPHILS 60 40 - 73 %    LYMPHOCYTES 33 21 - 52 %    MONOCYTES 5 3 - 10 %    EOSINOPHILS 2 0 - 5 %    BASOPHILS 0 0 - 2 %    ABS. NEUTROPHILS 4.2 1.8 - 8.0 K/UL    ABS. LYMPHOCYTES 2.2 0.9 - 3.6 K/UL    ABS. MONOCYTES 0.4 0.05 - 1.2 K/UL    ABS. EOSINOPHILS 0.1 0.0 - 0.4 K/UL    ABS.  BASOPHILS 0.0 0.0 - 0.1 K/UL    DF AUTOMATED     METABOLIC PANEL, COMPREHENSIVE    Collection Time: 01/18/19  6:05 PM   Result Value Ref Range    Sodium 139 136 - 145 mmol/L    Potassium 3.8 3.5 - 5.5 mmol/L    Chloride 102 100 - 108 mmol/L    CO2 29 21 - 32 mmol/L    Anion gap 8 3.0 - 18 mmol/L    Glucose 233 (H) 74 - 99 mg/dL    BUN 11 7.0 - 18 MG/DL    Creatinine 0.93 0.6 - 1.3 MG/DL    BUN/Creatinine ratio 12 12 - 20      GFR est AA >60 >60 ml/min/1.73m2    GFR est non-AA >60 >60 ml/min/1.73m2    Calcium 9.2 8.5 - 10.1 MG/DL    Bilirubin, total 0.5 0.2 - 1.0 MG/DL    ALT (SGPT) 52 13 - 56 U/L    AST (SGOT) 25 15 - 37 U/L    Alk. phosphatase 95 45 - 117 U/L    Protein, total 7.4 6.4 - 8.2 g/dL    Albumin 3.7 3.4 - 5.0 g/dL    Globulin 3.7 2.0 - 4.0 g/dL    A-G Ratio 1.0 0.8 - 1.7     CARDIAC PANEL,(CK, CKMB & TROPONIN)    Collection Time: 01/18/19  6:05 PM   Result Value Ref Range    CK 93 26 - 192 U/L    CK - MB 1.3 <3.6 ng/ml    CK-MB Index 1.4 0.0 - 4.0 %    Troponin-I, QT <0.02 0.0 - 0.045 NG/ML     Assessment/Plan:   60 y.o. female with PMH COPD on home O2, HTN, T2DM, obesity, Diastolic CHF, SHERRIE now admitted with COPD exacerbation. 1. COPD with Acute Exacerbation. H/O Moderate COPD, GOLD 2 (FEV1/FVC1 79% on PFTs 11/04/2016). DDx to include Asthma exacerbation, PNA. Vital signs on presentation reassuring: afebrile, elevated BPs 186/82 that have since normalized, not tachycardic, and intermittent tachypnea to 31 but typically between 18-21 on RA. No leukocytosis, electrolyte disturbances. Hyperglycemia 2/2 T2DM. Negative cardiac panel and EKG (QTc <450). Admissions for COPD exacerbation on 10/08/2018 and 11/17/2018 and patient reports 6 admissions for COPD exacerbations in the past year. Recent sputum cx grew Serratia marcescens and sensitivities reviewed. Patient has been on recent systemic glucoccorticoid therapy and numerous antibiotics including augmentin, doxycycline and levaquin.   - Admit to telemetry  - Lindsey q4h per RT  - Prednisone 40 mg po daily for five day burst  - Levaquin 750 mg IV every day for five additional days for total antibiotic duration of 7 days, increased pseudomonas risk for: Frequent administration of antibiotics, Frequent hospital admissions, and Systemic glucocorticoid use  - Continue supplemental O2 with HFNC and CPAP overnight  - Continue Singluar 10 mg QD  - Hold Advair 1 puff BID  - Hold Spiriva 1.25 mcg/act 2 puffs daily  - Daily BMP, CBC, mag/phos  - F/U sputum culture and gram stain  - promethazine prn cough  - PT/OT/CM  - Reccomend PCV13 vaccine as outpatient    2. H/O Diastolic CHF with preserved EF. Stable on exam, no JVD, no peripheral edema. ECHO 7/2018 EF 66-70% with no wma. LVH. - admit to telemetry   - proBNP 186 on 1/10/2019  - continue aspirin 81 mg po daily  - continue lisinopril 20 mg po bid    3. H/O HTN. BPs stable in ED ranging from 120s-180s. - Vital signs per unit routing  - Continue lisinopril 20 mg po bid  - Continue hctz 12.5 mg po daily    4. H/O T2DM. BG on admission 233. Last a1c 7.9 from 07/09/2018. - Accuchecks achs with sliding scale insulin for BG>220  - Continue home Lantus 30U qhs  - Diabetic education consult  - Consistent carbohydrate diet  - Hemoglobin A1c    5. H/O SHERRIE  - CPAP qhs; spoke with patient's daughter who will bring in machine    6. H/O GERD  - Continue home omeprazole 40 mg po daily   - Continue home famotidine 20 mg po bid prn reflux  - Continue home simethicone prn abdominal distention and gas reflief    Diet  DIABETIC   DVT Prophylaxis  SQH q12h   GI Prophylaxis  PPI   Code status  FULL   Disposition  Anticipate LOS>2 midnights to home with office f/u     [place] [employer]  Relationship: Daughter  [phone]      Myles Diaz DO , PGY-1   [address]   Intern Pager: [phone]   January 18, 2019, 8:22 PM       Admission History and Physical  [facility]        Patient: Alfredito Lopez MRN: [id number]  CSN: [id number]    YOB: 1960  Age: 60 y.o. Sex: female       DOA: 1/18/2019       HPI:      Jalen Uriarte is a 60 y.o. female with PMH of advanced COPD on home O2, HTN, Type II DM, diastolic CHF, and SHERRIE, now presenting with complaint of productive cough and dyspnea that has been present for over a month. The patient was hospitalized in 11/2018 for COPD exacerbation and had finished her steroid taper when she believe she caught viral URI from her granddaughter who was sick. The symptoms started off with nasal congestion, facial pressure, rhinorrhea, and cough, but gotten worse and was seen by the PCP office on 12/14/18 and was diagnosed with acute bronchitis with COPD exacerbation and was given course of Augmentin. The patient was not feeling any better and was seen in the ED on 1/10/19 and was given doxycycline. The patient was seen by her pulm, Dr. Rafael Briseno, on 1/7/19 and her sputum culture came back positive for Serratia marcescens that was pansensitive and she was placed on levaquin as well as 7 day course of prednisone. The patient took 2 day course of Levaquin but does not feel she is feeling any better since this started. She has been using albuterol rescue inhalers multiple times along with her multiple COPD medications including advair, Spiriva, singulair, . She also has been using her albuterol neb q3hrs without much relief. The patient uses home O2 at 2L when she is active and sleeping, but feels she has been needing higher amount and becomes more dyspneic with exertion. Patient denies any F/C, emesis, chest pain, myalgia, arthralgias, swelling. Admits to headaches, post tussive nausea. , sinus pressure, and rhinorrhea.     ED Course: albuterol neb x4, SoluMedrol 125mg x1, Mg 2g IV x1     Review of Systems   Constitutional: Positive for malaise/fatigue. Negative for chills, diaphoresis and fever. HENT: Positive for congestion and sinus pain. Eyes: Negative for photophobia, pain and discharge.    Respiratory: Positive for cough, sputum production, shortness of breath and wheezing. Cardiovascular: Positive for orthopnea. Negative for chest pain and leg swelling. Gastrointestinal: Positive for nausea. Negative for abdominal pain and vomiting. Genitourinary: Negative for dysuria. Musculoskeletal: Negative for myalgias. Neurological: Positive for headaches.  Negative for sensory change, focal weakness, seizures and loss of consciousness.              Past Medical History:   Diagnosis Date    Asthma      Chronic lung disease      COPD      Cystocele, midline      Diabetes mellitus ([facility] [address]      GERD (gastroesophageal reflux disease)      Hidradenitis suppurativa      Hyperlipidemia      Hypertension      SHERRIE on CPAP       CPAP    Stress incontinence                 Past Surgical History:   Procedure Laterality Date    BREAST SURGERY PROCEDURE UNLISTED         Right breast benign tumor removal    HX APPENDECTOMY        HX CHOLECYSTECTOMY        HX DILATION AND CURETTAGE         Dysfunctional uterine bleeding, thought 2/2 fibroids    HX TUBAL LIGATION                   Family History   Problem Relation Age of Onset    Hypertension Mother      Stroke Mother      Breast Cancer Mother           Bilateral mastectomies    Cancer Mother           ovarian and breast    Heart Failure Mother      Heart Attack Father           2011    Heart Surgery Father           CABG    Heart Failure Father      COPD Sister           Heavy smoker    Cancer Sister           ovarian    Heart Failure Sister      Lung Disease Sister      Asthma Child      Cancer Maternal Aunt           pancreatic    Cancer Maternal Grandfather           stomach         Social History               Socioeconomic History    Marital status: LEGALLY        Spouse name: Not on file    Number of children: Not on file    Years of education: Not on file    Highest education level: Not on file   Tobacco Use    Smoking status: Former Smoker       Packs/day: 1.00       Years: 30.00       Pack years: 30.00       Types: Cigarettes       Start date: 1966       Last attempt to quit: 2006       Years since quittin.3    Smokeless tobacco: Never Used    Tobacco comment: 1-1.5 packs per day   Substance and Sexual Activity    Alcohol use: No    Drug use: No    Sexual activity: No                  Allergies   Allergen Reactions    Ancef [Cefazolin] Hives    Contrast Agent [Iodine] Anaphylaxis, Shortness of Breath and Swelling       Needs pre-medication for IV contrast with Benadryl, Solu-Medrol    Fish Containing Products Anaphylaxis       Pt states she had a severe allergic reaction at 9 y/o.  Metformin Other (comments)       Abdominal pain, diarrhea.  Codeine Other (comments)       Altered mental status         Prior to Admission Medications   Prescriptions Last Dose Informant Patient Reported? Taking? NOVOLOG FLEXPEN U-100 INSULIN 100 unit/mL inpn     No No   Sig: Continue home Sliding scale insulin as prior to admission   Patient taking differently: If BG <100=0u  101-150=5u  151-250=8u  251-300=12u  >300 call MD   OXYGEN-AIR DELIVERY SYSTEMS     Yes No   Si L by Nasal route continuous. First Choice   albuterol (PROVENTIL HFA, VENTOLIN HFA, PROAIR HFA) 90 mcg/actuation inhaler     No No   Sig: Take 2 Puffs by inhalation every four (4) hours as needed for Wheezing. For the next 2 days after hospital discharge, use your inhaler 4 times a day. albuterol (PROVENTIL VENTOLIN) 2.5 mg /3 mL (0.083 %) nebulizer solution     No No   Sig: 3 mL by Nebulization route every four (4) hours as needed for Wheezing. Indications: BRONCHOSPASM PREVENTION, Chronic Obstructive Pulmonary Disease   albuterol sulfate 90 mcg/actuation aepb     No No   Sig: Take 1 Puff by inhalation every four (4) hours. albuterol-ipratropium (DUO-NEB) 2.5 mg-0.5 mg/3 ml nebu     No No   Sig: 3 mL by Nebulization route every six (6) hours as needed.    aspirin delayed-release 81 mg tablet     Yes No   Sig: Take 81 mg by mouth daily. cpap machine kit     Yes No   Sig: by Does Not Apply route nightly. doxycycline (VIBRA-TABS) 100 mg tablet     No No   Sig: Take 1 Tab by mouth two (2) times a day for 7 days. famotidine (PEPCID) 20 mg tablet     Yes No   Sig: Take 20 mg by mouth two (2) times daily as needed for Other (reflux). fluticasone (FLONASE) 50 mcg/actuation nasal spray     Yes No   Si Sprays by Both Nostrils route daily. fluticasone-salmeterol (ADVAIR DISKUS) 100-50 mcg/dose diskus inhaler     No No   Sig: Take 1 Puff by inhalation every twelve (12) hours. fluticasone-salmeterol (ADVAIR HFA) 230-21 mcg/actuation inhaler     Yes No   Sig: Take 2 Puffs by inhalation two (2) times a day. hydroCHLOROthiazide (HYDRODIURIL) 12.5 mg tablet     Yes No   Sig: Take 12.5 mg by mouth daily. indapamide (LOZOL) 1.25 mg tablet     Yes No   Sig: Take 1.25 mg by mouth daily. insulin glargine (LANTUS,BASAGLAR) 100 unit/mL (3 mL) inpn     No No   Si Units by SubCUTAneous route nightly. ipratropium (ATROVENT) 0.02 % soln     No No   Si.5 mL by Nebulization route four (4) times daily as needed. Indications: BRONCHOSPASM PREVENTION WITH COPD   Patient not taking: Reported on 2018   levoFLOXacin (LEVAQUIN) 500 mg tablet     No No   Sig: Take 1 Tab by mouth daily. lisinopril (PRINIVIL, ZESTRIL) 40 mg tablet     Yes No   Sig: Take 20 mg by mouth two (2) times a day. Indications: hypertension   montelukast (SINGULAIR) 10 mg tablet     Yes No   Sig: Take 10 mg by mouth nightly. omeprazole (PRILOSEC) 40 mg capsule     Yes No   Sig: Take 40 mg by mouth daily. Indications: gastroesophageal reflux disease   predniSONE (DELTASONE) 20 mg tablet     No No   Si tablets every morning with breakfast for 7 days   simethicone (MYLICON) 80 mg chewable tablet     Yes No   Sig: Take 80 mg by mouth every six (6) hours as needed for Flatulence.    tiotropium bromide (SPIRIVA RESPIMAT) 2.5 mcg/actuation inhaler     Yes No   Sig: Take 2 Puffs by inhalation daily. Facility-Administered Medications: None         Physical Exam:      Patient Vitals for the past 24 hrs:    Temp Pulse Resp BP SpO2   01/18/19 1845 -- 93 22 148/57 98 %   01/18/19 1745 98.4 °F (36.9 °C) 92 20 186/82 95 %         Physical Exam   Constitutional: She is oriented to person, place, and time. She appears well-developed and well-nourished. She appears distressed. Obese   HENT:   Head: Normocephalic and atraumatic. Eyes: Conjunctivae and EOM are normal. No scleral icterus. Neck: Normal range of motion. Neck supple. Cardiovascular: Normal rate, regular rhythm and normal heart sounds. Pulmonary/Chest: No stridor. She is in respiratory distress. She has wheezes. She has rales. Mild respiratory distress   Abdominal: Soft. She exhibits no distension. There is no tenderness. obese   Musculoskeletal: Normal range of motion. She exhibits no edema. Neurological: She is alert and oriented to person, place, and time. Skin: Skin is warm. She is not diaphoretic. No erythema.    Psychiatric: She has a normal mood and affect.         IMAGING:       CXR Results  (Last 48 hours)     None                Recent Results          Recent Results (from the past 12 hour(s))   EKG, 12 LEAD, INITIAL     Collection Time: 01/18/19  5:56 PM   Result Value Ref Range     Ventricular Rate 84 BPM     Atrial Rate 84 BPM     P-R Interval 140 ms     QRS Duration 82 ms     Q-T Interval 362 ms     QTC Calculation (Bezet) 427 ms     Calculated P Axis 44 degrees     Calculated R Axis 23 degrees     Calculated T Axis 55 degrees     Diagnosis           Normal sinus rhythm  Normal ECG  When compared with ECG of 10-JAMMIE-2019 12:38,  No significant change was found  Confirmed by Allen Roe MD, Willian ([id number]) on 1/18/2019 8:16:35 PM      CBC WITH AUTOMATED DIFF     Collection Time: 01/18/19  6:05 PM   Result Value Ref Range     WBC 6.9 4.6 - 13.2 K/uL     RBC 4.53 4.20 - 5.30 M/uL     HGB 13.8 12.0 - 16.0 g/dL     HCT 39.7 35.0 - 45.0 %     MCV 87.6 74.0 - 97.0 FL     MCH 30.5 24.0 - 34.0 PG     MCHC 34.8 31.0 - 37.0 g/dL     RDW 13.5 11.6 - 14.5 %     PLATELET 580 328 - 634 K/uL     MPV 9.1 (L) 9.2 - 11.8 FL     NEUTROPHILS 60 40 - 73 %     LYMPHOCYTES 33 21 - 52 %     MONOCYTES 5 3 - 10 %     EOSINOPHILS 2 0 - 5 %     BASOPHILS 0 0 - 2 %     ABS. NEUTROPHILS 4.2 1.8 - 8.0 K/UL     ABS. LYMPHOCYTES 2.2 0.9 - 3.6 K/UL     ABS. MONOCYTES 0.4 0.05 - 1.2 K/UL     ABS. EOSINOPHILS 0.1 0.0 - 0.4 K/UL     ABS. BASOPHILS 0.0 0.0 - 0.1 K/UL     DF AUTOMATED     METABOLIC PANEL, COMPREHENSIVE     Collection Time: 01/18/19  6:05 PM   Result Value Ref Range     Sodium 139 136 - 145 mmol/L     Potassium 3.8 3.5 - 5.5 mmol/L     Chloride 102 100 - 108 mmol/L     CO2 29 21 - 32 mmol/L     Anion gap 8 3.0 - 18 mmol/L     Glucose 233 (H) 74 - 99 mg/dL     BUN 11 7.0 - 18 MG/DL     Creatinine 0.93 0.6 - 1.3 MG/DL     BUN/Creatinine ratio 12 12 - 20       GFR est AA >60 >60 ml/min/1.73m2     GFR est non-AA >60 >60 ml/min/1.73m2     Calcium 9.2 8.5 - 10.1 MG/DL     Bilirubin, total 0.5 0.2 - 1.0 MG/DL     ALT (SGPT) 52 13 - 56 U/L     AST (SGOT) 25 15 - 37 U/L     Alk. phosphatase 95 45 - 117 U/L     Protein, total 7.4 6.4 - 8.2 g/dL     Albumin 3.7 3.4 - 5.0 g/dL     Globulin 3.7 2.0 - 4.0 g/dL     A-G Ratio 1.0 0.8 - 1.7     CARDIAC PANEL,(CK, CKMB & TROPONIN)     Collection Time: 01/18/19  6:05 PM   Result Value Ref Range     CK 93 26 - 192 U/L     CK - MB 1.3 <3.6 ng/ml     CK-MB Index 1.4 0.0 - 4.0 %     Troponin-I, QT <0.02 0.0 - 0.045 NG/ML               Assessment/Plan:   60 y.o. female with PMH of advanced COPD on home O2, HTN, Type II DM, diastolic CHF, and SHERRIE, now admitted with COPD exacerbation.     COPD exacerbation with outpatient failure  Patient's last COPD exacerbation was in 11/2018 and had multiple COPD exacerbations but never intubated.  Her Last FEV1/FVC 0.56 (71% predicted), DLCO 44% predicted in 11/2016. GOLD 2; CXR negative. No leukocytosis noted on admit and patient is afebrile. Sputum culture from 1/7/19 showed pan-sensitive Serratia and patient took 2 day course of Levaquin  CXR on admit does not appear to have acute cardiopulm pathology. Pending radiology report  Rapid flu neg on 1/2/19 outpatient  - Admit to medicine  - Duoneb q4h per RT  - pulmonary toileting  - Prednisone 40mg daily for 5 days  - supplemental O2 PRN  - Levaquin 750mg IV every day for 5 more days  - C/w home med singulair  - holding home med Advair, spiriva  - C/w CPAP  - daily BMP, CBC, Mg  - FU sputum Cx     SHERRIE  - c/w CPAP @ night     Diastolic CHF  - c/w home meds lisinopril 40mg daily, HCTZ     Type II DM- HbA1c 7.7 10/81/8; Pt intolerant to Metformin  - Accuchecks ACHS   - Continue home (Basaglar/Lantus/Glargine) 30 units qHS  - Hold home sliding scale insulin (Novolog Flexpen/Aspart) and [facility]        *Please see intern note above for additional chronic disease mgmt.           Juan Madrid  PGY-2 [address] [facility]  01/18/19 8:40 PM

## 2019-01-19 NOTE — DISCHARGE SUMMARY
[address] [facility]  Discharge Summary      Patient: Trang Box MRN: [id number]  CSN: [id number]    YOB: 1960  Age: 60 y.o. Sex: female      Admission Date: 1/18/2019 Discharge Date: 1/21/2019   Attending: Pito España MD PCP: Gordy Noble MD     ================================================================    Reason for Admission: Acute exacerbation of chronic obstructive pulmonary disease (COPD) ([facility] [address]  Dyspnea  Acute exacerbation of chronic obstructive pulmonary disease (COPD) ([facility]    Discharge Diagnoses:   COPD  Diastolic CHF w/ preserved EF  HTN  DM   GERD  SHERRIE    Important notes to PCP/ follow-up studies and evaluations   1. Continue Levaquin 750mg daily for 5 days. 2. Continue Prednisone taper for 2 weeks. 3. Continue chronic macrolide therapy. 4. Continue Flonase, Spiriva Respimat, Advair    Pending labs and studies:  None  Operative Procedures:   None    Discharge Medications:     Current Discharge Medication List      START taking these medications    Details   levoFLOXacin (LEVAQUIN) 750 mg tablet Take 1 Tab by mouth Daily (before breakfast) for 5 days. Qty: 5 Tab, Refills: 0      predniSONE (DELTASONE) 10 mg tablet Take 2 tabs daily for one week. Then take 1 tab daily for 4 days. Then take one tablet every other day until finished. Qty: 20 Tab, Refills: 0         CONTINUE these medications which have NOT CHANGED    Details   fluticasone-salmeterol (ADVAIR HFA) 115-21 mcg/actuation inhaler Take 2 Puffs by inhalation two (2) times a day. hydroCHLOROthiazide (HYDRODIURIL) 12.5 mg tablet Take 12.5 mg by mouth daily. tiotropium bromide (SPIRIVA RESPIMAT) 2.5 mcg/actuation inhaler Take 2 Puffs by inhalation daily. insulin glargine (LANTUS,BASAGLAR) 100 unit/mL (3 mL) inpn 35 Units by SubCUTAneous route nightly.   Qty: 28 Units, Refills: 0      albuterol sulfate 90 mcg/actuation aepb Take 1 Puff by inhalation every four (4) hours.  Qty: 1 Inhaler, Refills: 0      NOVOLOG FLEXPEN U-100 INSULIN 100 unit/mL inpn Continue home Sliding scale insulin as prior to admission  Qty: 1 Pen, Refills: 0      albuterol (PROVENTIL VENTOLIN) 2.5 mg /3 mL (0.083 %) nebulizer solution 3 mL by Nebulization route every four (4) hours as needed for Wheezing. Indications: BRONCHOSPASM PREVENTION, Chronic Obstructive Pulmonary Disease  Qty: 30 Each, Refills: 0      omeprazole (PRILOSEC) 40 mg capsule Take 40 mg by mouth daily. Indications: gastroesophageal reflux disease      lisinopril (PRINIVIL, ZESTRIL) 40 mg tablet Take 20 mg by mouth two (2) times a day. Indications: hypertension      simethicone (MYLICON) 80 mg chewable tablet Take 80 mg by mouth every six (6) hours as needed for Flatulence. cpap machine kit by Does Not Apply route nightly. OXYGEN-AIR DELIVERY SYSTEMS 2 L by Nasal route continuous. First Choice      aspirin delayed-release 81 mg tablet Take 81 mg by mouth daily. famotidine (PEPCID) 20 mg tablet Take 20 mg by mouth two (2) times daily as needed for Other (reflux). montelukast (SINGULAIR) 10 mg tablet Take 10 mg by mouth nightly. albuterol-ipratropium (DUO-NEB) 2.5 mg-0.5 mg/3 ml nebu 3 mL by Nebulization route every six (6) hours as needed. Qty: 30 Nebule, Refills: 0             Disposition: Home    Consultants:    97 Marlon Jacobsen B Course (including pertinent history and physical findings)  Sandi Horowitz is a 60 y.o. female with PMH COPD on home O2, HTN, T2DM, obesity, Diastolic CHF, SHERRIE, who presented to the ER with the complaint of cough and increasing dyspnea. She has had 6 hospitalizations for COPD exacerbations over the past year. She has failed recent outpatient treatment with Doxy and Levaquin. She continued to have a severe cough that caused her to become quite fatigued. She had been requiring more frequent use of her home O2 and her rescue inhaler with nebulizer treatments.  In the ER, she was hypertensive and tachypneic. She received several neb treatments, Solu-medrol 125mg. Once admitted, she was started on scheduled neb treatments and started on a steroid burst. Pulmonology was consulted. Levaquin was continued. She improved significantly over her hospital admission, and was discharged on hospital day 3. She was discharged on a 2 week steroid taper and Levaquin for an additional 5 days. Summarized key findings and results (labs, imaging studies, ECHO, cardiac cath, endoscopies, etc):    Xr Chest Sngl V    Result Date: 1/19/2019  IMPRESSION: Mild streaky bibasilar opacities, likely atelectasis and/or scar. Chemistry Recent Labs     01/21/19  0251 01/20/19  0404 01/19/19  0651 01/18/19  1805   * 233* 341* 233*    140 138 139   K 3.6 3.3* 4.2 3.8    103 103 102   CO2 28 27 25 29   BUN 19* 18 16 11   CREA 0.93 0.87 1.10 0.93   CA 9.1 9.1 9.2 9.2   MG 2.0 1.9 2.0  --    PHOS  --   --  3.1  --    AGAP 6 10 10 8   BUCR 20 21* 15 12   AP  --   --   --  95   TP  --   --   --  7.4   ALB  --   --   --  3.7   GLOB  --   --   --  3.7   AGRAT  --   --   --  1.0        CBC w/Diff Recent Labs     01/21/19  0251 01/20/19  0404 01/19/19  0651   WBC 10.3 9.9 11.7   RBC 4.08* 4.05* 4.45   HGB 12.3 12.0 13.4   HCT 36.4 36.1 39.4    273 316   GRANS 64 72 90*   LYMPH 30 22 9*   EOS 0 0 0        Liver Enzymes Protein, total   Date Value Ref Range Status   01/18/2019 7.4 6.4 - 8.2 g/dL Final     Albumin   Date Value Ref Range Status   01/18/2019 3.7 3.4 - 5.0 g/dL Final     Globulin   Date Value Ref Range Status   01/18/2019 3.7 2.0 - 4.0 g/dL Final     A-G Ratio   Date Value Ref Range Status   01/18/2019 1.0 0.8 - 1.7   Final     AST (SGOT)   Date Value Ref Range Status   01/18/2019 25 15 - 37 U/L Final     Alk.  phosphatase   Date Value Ref Range Status   01/18/2019 95 45 - 117 U/L Final     Recent Labs     01/18/19  1805   TP 7.4   ALB 3.7   GLOB 3.7   AGRAT 1.0 SGOT 25   AP 95        Lactic Acid Lactic acid   Date Value Ref Range Status   02/10/2017 1.9 0.4 - 2.0 MMOL/L Final     No results for input(s): LAC in the last 72 hours. BNP No results found for: BNP, BNPP, XBNPT     Cardiac Enzymes No results found for: CPK, CK, CKMMB, CKMB, RCK3, CKMBT, CKNDX, CKND1, KESHIA, TROPT, TROIQ, JOEL, TROPT, TNIPOC, BNP, BNPP     Coagulation No results for input(s): PTP, INR, APTT in the last 72 hours. No lab exists for component: INREXT, INREXT      Thyroid  Lab Results   Component Value Date/Time    TSH 2.76 09/18/2016 02:20 PM          Lipid Panel Lab Results   Component Value Date/Time    Cholesterol, total 220 (H) 11/20/2012 03:22 AM    HDL Cholesterol 62 (H) 11/20/2012 03:22 AM    LDL, calculated 144.6 (H) 11/20/2012 03:22 AM    VLDL, calculated 13.4 11/20/2012 03:22 AM    Triglyceride 67 11/20/2012 03:22 AM    CHOL/HDL Ratio 3.5 11/20/2012 03:22 AM        ABG No results for input(s): PHI, PHI, POC2, PCO2I, PO2, PO2I, HCO3, HCO3I, FIO2, FIO2I in the last 72 hours. Urinalysis Lab Results   Component Value Date/Time    Color YELLOW 02/08/2017 03:30 PM    Appearance CLEAR 02/08/2017 03:30 PM    Specific gravity 1.026 02/08/2017 03:30 PM    pH (UA) 5.0 02/08/2017 03:30 PM    Protein NEGATIVE  02/08/2017 03:30 PM    Glucose >1,000 (A) 02/08/2017 03:30 PM    Ketone TRACE (A) 02/08/2017 03:30 PM    Bilirubin NEGATIVE  02/08/2017 03:30 PM    Urobilinogen 0.2 02/08/2017 03:30 PM    Nitrites NEGATIVE  02/08/2017 03:30 PM    Leukocyte Esterase NEGATIVE  02/08/2017 03:30 PM    Epithelial cells FEW 11/19/2012 02:01 PM    Bacteria FEW (A) 02/29/2012 04:26 AM    WBC 0 to 1 11/19/2012 02:01 PM    RBC NONE 11/19/2012 02:01 PM        Micro No results for input(s): SDES, CULT in the last 72 hours. No results for input(s): CULT in the last 72 hours.      Functional status and cognitive function:    Ambulates with: on her own  Status: alert, cooperative, no distress, appears stated age    Diet: Diabetic    Code status and advanced care plan: Full  [facility] of Contact: Shanel Cabral  Relationship: Daughter  [phone]    Patient Education:  Patient was educated on the following topics prior to discharge: COPD    Follow-up:   Follow-up Information     Follow up With Specialties Details Why Contact Info    Puneet Pepe MD Family Practice In 2 days [facility] [address] [facility]  [phone]            ================================================================    Raffi Rodriguez DO, PGY-1   [address] Medicine   Intern Pager: [phone]   January 21, 2019, 11:11 PM

## 2019-01-19 NOTE — PROGRESS NOTES
Reminded pt that we still need a sputum culture. pt said she is unable to produce one at the moment but will continue to try.

## 2019-01-19 NOTE — CONSULTS
[facility]  Pulmonary, Critical Care, and Sleep Medicine      Pulmonary  Consultation    Name: Mayur Buckley     : 1960     Date: 2019        Subjective:     Patient is a 60 y.o. female  with PMH COPD on home O2, HTN, T2DM, obesity, Diastolic CHF, SHERRIE, now presenting with complaint of cough and increasing dyspnea.     Patient has had 6 hospitalizations for COPD exacerbations over the past year. She has not had to be intubated. Patient describes the course of this exacerbation as different in that it started with a (presumed) viral illness. In December, patient saw Dr. Mansi Saxena at [facility] when she had complaints of rhinorrhea and sinus congestion for which she was begun on Augmentin. Patient felt better after being on the augmentin for three days but then began to feel worse again. On , she was seen by Dr. Summer Sanders at [facility] where she continued to have URI symptoms and was flu negative. She saw her pulmonologist, Dr. Deniz Hyatt, several days later and was given a five day course of steroids and a sputum culture was obtained. She continued to deteriorate and recently sought care at the ED where she was prescribed doxycyline. She took doxy for three days before the results of her sputum culture grew out Serratia marcescens and Dr. Deniz Hyatt switched her to levaquin. She has taken levaquin for the past two days. She continues to have a voracious cough that has caused her to become quite fatigued. Her sputum over this time period has changed from white to a light green to a dark green and is now yellow. She has needed her home O2 at all times whereas she would typically only use it with exertional activities. She has been using her rescue inhaler and neb treatments with increased frequency.    She says earlier today she was saturating 91% on RA and approximately 96% on 2L oxygen.      On presentation to the ED, she was afebrile, hypertensive with SBP in the 180s and tachypneic on RA but saturating well. She received several neb treatments, a 125 mg solu-medrol injection and her magnesium was repleted. She saturated well on 2L NC and her BPs have been stable in the 120s-150s. Denies GERD  No fevers or chills. She endorsed SOB and a minimally productive cough. Endorsed slight nausea but no vomiting. In July she had IgE checked- 157. Allergen profile - Negative      Asthma History;  Frequency of symptoms: almost daily Cough, intermittent wheezing, in day time and night time with seasonal- spring and fall worsening  Triggers: environmental, smoke, dust,   No PET's.  No change in home environment  Relief from-  less than twice a week and daily  Peak Flows: personal best  H/o ER visits  No H/o previous intubation  H/o nasal congestion, postnasal drainage, sneezing  H/o itchy eyes, skin rash, itching  H/o nasal /sinus surgery  No H/o Aspirin allergy  No H/o travel  No H/o smoking, recreational drug use  H/o Sleep related symptoms- snoring, awakening due to SOB, chiking, wheezing  No PETs- Cats, dogs, birds  Occupational: no exposures    Past Medical History:   Diagnosis Date    Asthma     Chronic lung disease     COPD     Cystocele, midline     Diabetes mellitus ([address]     GERD (gastroesophageal reflux disease)     Hidradenitis suppurativa     Hyperlipidemia     Hypertension     SHERRIE on CPAP     CPAP    Stress incontinence        Past Surgical History:   Procedure Laterality Date    BREAST SURGERY PROCEDURE UNLISTED      Right breast benign tumor removal    HX APPENDECTOMY      HX CHOLECYSTECTOMY      HX DILATION AND CURETTAGE      Dysfunctional uterine bleeding, thought 2/2 fibroids    HX TUBAL LIGATION         Social History     Socioeconomic History    Marital status: LEGALLY      Spouse name: Not on file    Number of children: Not on file    Years of education: Not on file    Highest education level: Not on file   Tobacco Use    Smoking status: Former Smoker     Packs/day: 1.00     Years: 30.00     Pack years: 30.00     Types: Cigarettes     Start date: 1966     Last attempt to quit: 2006     Years since quittin.3    Smokeless tobacco: Never Used    Tobacco comment: 1-1.5 packs per day   Substance and Sexual Activity    Alcohol use: No    Drug use: No    Sexual activity: No       Family History   Problem Relation Age of Onset    Hypertension Mother     Stroke Mother     Breast Cancer Mother         Bilateral mastectomies    Cancer Mother         ovarian and breast    Heart Failure Mother     Heart Attack Father         2011    Heart Surgery Father         CABG    Heart Failure Father     COPD Sister         Heavy smoker    Cancer Sister         ovarian    Heart Failure Sister     Lung Disease Sister     Asthma Child     Cancer Maternal Aunt         pancreatic    Cancer Maternal Grandfather         stomach     Allergies   Allergen Reactions    Ancef [Cefazolin] Hives    Contrast Agent [Iodine] Anaphylaxis, Shortness of Breath and Swelling     Needs pre-medication for IV contrast with Benadryl, Solu-Medrol    Fish Containing Products Anaphylaxis     Pt states she had a severe allergic reaction at 7 y/o.  Metformin Other (comments)     Abdominal pain, diarrhea.     Codeine Other (comments)     Altered mental status     Current Facility-Administered Medications   Medication Dose Route Frequency Provider Last Rate Last Dose    aspirin delayed-release tablet 81 mg  81 mg Oral DAILY Ras Miles, DO   81 mg at 19 0841    famotidine (PEPCID) tablet 20 mg  20 mg Oral BID PRN Ras Miles DO        hydroCHLOROthiazide (HYDRODIURIL) tablet 12.5 mg  12.5 mg Oral DAILY Ras Miles, DO   12.5 mg at 19 0841    lisinopril (PRINIVIL, ZESTRIL) tablet 20 mg  20 mg Oral BID Ras Miles DO   20 mg at 19 0841    montelukast (SINGULAIR) tablet 10 mg  10 mg Oral QHS Ras Miles, DO 10 mg at 01/19/19 0148    pantoprazole (PROTONIX) tablet 40 mg  40 mg Oral ACB MilesRas, DO   40 mg at 01/19/19 0841    simethicone (MYLICON) tablet 80 mg  80 mg Oral QID PRN Achilles Number T, DO        sodium chloride (NS) flush 5-40 mL  5-40 mL IntraVENous Q8H MilesRas arce, DO   10 mL at 01/19/19 1094    sodium chloride (NS) flush 5-40 mL  5-40 mL IntraVENous PRN MilesRas arce, DO        naloxone [facility]) injection 0.4 mg  0.4 mg IntraVENous PRN MilesRas arce, DO        acetaminophen (TYLENOL) tablet 650 mg  650 mg Oral Q4H PRN MilesRas arce, DO        ondansetron (ZOFRAN ODT) tablet 4 mg  4 mg Oral Q4H PRN MilesRas arce, DO        heparin (porcine) injection 5,000 Units  5,000 Units SubCUTAneous Q12H MilesRas arce, DO   5,000 Units at 01/19/19 0148    promethazine (PHENERGAN) 6.25 mg/5 mL syrup 25 mg  25 mg Oral Q6H PRN MilesRas arce, DO        albuterol-ipratropium (DUO-NEB) 2.5 MG-0.5 MG/3 ML  3 mL Nebulization Q4H RT MilesRas arce, DO   3 mL at 01/19/19 0737    levoFLOXacin (LEVAQUIN) 750 mg in D5W IVPB  750 mg IntraVENous Q24H MilesRas arce,  mL/hr at 01/19/19 0854 750 mg at 01/19/19 0854    predniSONE (DELTASONE) tablet 40 mg  40 mg Oral DAILY WITH BREAKFAST MilesRas arce, DO   40 mg at 01/19/19 0841    insulin lispro (HUMALOG) injection   SubCUTAneous AC&HS Achilles Number T, DO   4 Units at 01/19/19 0854    glucose chewable tablet 16 g  4 Tab Oral PRN Blinda CleverRas jacobs, DO        glucagon (GLUCAGEN) injection 1 mg  1 mg IntraMUSCular PRN MilesRas arce,         dextrose (D50W) injection syrg 12.5-25 g  25-50 mL IntraVENous PRN MilesRas arce, DO        insulin glargine (LANTUS) injection 30 Units  30 Units SubCUTAneous QHS Miles, Ras HERNANDEZ DO   30 Units at 01/19/19 0148         Review of Systems:    Constitutional: Positive for malaise/fatigue. Negative for chills and fever.    HENT: Positive for congestion, sinus pain and sore throat. Eyes: Negative for double vision and photophobia. Respiratory: Positive for cough, sputum production, shortness of breath and wheezing. Negative for hemoptysis. Cardiovascular: Positive for chest pain. Negative for palpitations and leg swelling. Gastrointestinal: Positive for abdominal pain, heartburn and nausea. Negative for constipation, diarrhea and vomiting. Genitourinary: Negative for dysuria, hematuria and urgency. Musculoskeletal: Positive for myalgias. Negative for joint pain. Skin: Negative for itching and rash. Neurological: Negative for dizziness and headaches. Psychiatric/Behavioral: Negative for depression. The patient is not nervous/anxious. Objective:     Visit Vitals  /76 (BP 1 Location: Left arm, BP Patient Position: At rest)   Pulse 92   Temp 97.7 °F (36.5 °C)   Resp 23   Ht 5' 3\" (1.6 m)   Wt 113.3 kg (249 lb 12.5 oz)   SpO2 93%   Breastfeeding?  No   BMI 44.25 kg/m²        Physical Exam:   GENERAL: well developed and in  moderate distress  HEENT:  PERRL, EOMI, no alar flaring or epistaxis, oral mucosa moist without cyanosis  NECK:  no jugular vein distention, no retractions, no thyromegaly or masses  LUNGS:  wheezes bilaterally  HEART:  Regular rate and rhythm with no M,G,R; no edema is present  ABDOMEN:  soft with no tenderness, bowel sounds present  EXTREMITIES:  warm with no cyanosis  SKIN:  no jaundice or ecchymosis  NEUROLOGIC:  alert and oriented, grossly non-focal    Data review:   Sputum culture- 1/14/2019  Serratia marcescens (1)      Antibiotic Interpretation Method Status     Cefazolin ($) Resistant JENNIFER Final     Cefepime ($$) Susceptible JENNIFER Final     Ceftazidime ($) Susceptible JENNIFER Final     Ceftriaxone ($) Susceptible JENNIFER Final     Ciprofloxacin ($) Susceptible JENNIFER Final     Gentamicin ($) Susceptible JENNIFER Final     Levofloxacin ($) Susceptible JENNIFER Final     Tobramycin ($) Susceptible JENNIFER Final     Trimeth-Sulfamethoxa Susceptible JENNIFER Final          Blood Eosinophils: no eosinophilia  IgE level: 157  Allergy testing:negative  [facility] allergen panel:negative  Sputum eosinophils: not done    PFT:  Date FEV1/FVC FEV1%Best GNW21-30% FVC%best TLC% RV% VC% DLCO%   5/27/2016 54 1.32/51% 0.42/17% 2.54/77% 111 202 62 69                Methacholine challenge: not done    Imaging:  I have personally reviewed the patients radiographs and have reviewed the reports:  XR Results (most recent):  Results from Hospital Encounter encounter on 01/18/19   XR CHEST SNGL V    Narrative PA CHEST    CPT CODE: 76641    INDICATION: Shortness of breath. COPD exacerbation. .    COMPARISON: 1/10/2019 and 6/2/2018 PA and lateral.    TECHNIQUE: Single PA chest radiograph is reviewed. FINDINGS:    Mild streaky bibasilar opacities similar to prior studies, likely atelectasis  and/or scar. No evidence of pneumothorax. The costophrenic sulci appear sharp. The cardiac silhouette is upper normal in size. No acute osseous abnormalities identified. Impression IMPRESSION:     Mild streaky bibasilar opacities, likely atelectasis and/or scar. IMPRESSION:   · Asthma- COPD overlap syndrome- difficult to control T2 low. Severity- moderate with poor Control. Has had several hospitalizations over the past year for COPD-Asthma exacerbations (this is the 6th this year) -Admitted for acute exacerbation failed outpatient therapy with antibiotics and oral steroids. Now with  evidence for infection - serratia and changed to appropriate antibiotics. Triggers:environmental allergens. ? 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  [facility]- ENT following  · Comorbid conditions- Obesity, DM           · Will optimize in hospital treatment with nebulized bronchodilators, steroids and stepped up Bronchial hygiene protocol  · Use multi targeted trigger optimization strategy  · Agree with Levaquin to treat Serratia bronchitis  · Bronchial hygiene- add mucolytics and flutter device  · Consider chronic Macrolide therapy as antiinflammatory treatment  · IgE, allergen profile reviewed. Not a candidate for biologics  · Trigger control- rhinitis, GERD, environmental allergens  · Continue Flonase  · Discussed concept of controllers, rescue  · Spiriva Respimet and Advair can be resumed at discharge  · Rescue inhaler-albuterol  · Peak flow monitoring  · Action plan given with discussion about Green, Yellow, Red zone actions  · Oxygen and CPAP for home use  · Preventive vaccinations: Influenza, Pneumovax  · Will follow       Health maintenance screens deferred to Primary care provider. High complexity decision making was performed during the evaluation of this patient at high risk for decompensation with multiple organ involvement     Above mentioned total time spent on reviewing the case/medical record/data/notes/EMR/patient examination/documentation/coordinating care with nurse/consultants, exclusive of procedures with complex decision making performed and > 50% time spent in face to face evaluation.      Bran Eldridge MD

## 2019-01-20 LAB
ANION GAP SERPL CALC-SCNC: 10 MMOL/L (ref 3–18)
BASOPHILS # BLD: 0 K/UL (ref 0–0.1)
BASOPHILS NFR BLD: 0 % (ref 0–2)
BUN SERPL-MCNC: 18 MG/DL (ref 7–18)
BUN/CREAT SERPL: 21 (ref 12–20)
CALCIUM SERPL-MCNC: 9.1 MG/DL (ref 8.5–10.1)
CHLORIDE SERPL-SCNC: 103 MMOL/L (ref 100–108)
CO2 SERPL-SCNC: 27 MMOL/L (ref 21–32)
CREAT SERPL-MCNC: 0.87 MG/DL (ref 0.6–1.3)
DIFFERENTIAL METHOD BLD: ABNORMAL
EOSINOPHIL # BLD: 0 K/UL (ref 0–0.4)
EOSINOPHIL NFR BLD: 0 % (ref 0–5)
ERYTHROCYTE [DISTWIDTH] IN BLOOD BY AUTOMATED COUNT: 13.8 % (ref 11.6–14.5)
GLUCOSE BLD STRIP.AUTO-MCNC: 199 MG/DL (ref 70–110)
GLUCOSE BLD STRIP.AUTO-MCNC: 307 MG/DL (ref 70–110)
GLUCOSE BLD STRIP.AUTO-MCNC: 317 MG/DL (ref 70–110)
GLUCOSE BLD STRIP.AUTO-MCNC: 357 MG/DL (ref 70–110)
GLUCOSE SERPL-MCNC: 233 MG/DL (ref 74–99)
HCT VFR BLD AUTO: 36.1 % (ref 35–45)
HGB BLD-MCNC: 12 G/DL (ref 12–16)
LYMPHOCYTES # BLD: 2.2 K/UL (ref 0.9–3.6)
LYMPHOCYTES NFR BLD: 22 % (ref 21–52)
MAGNESIUM SERPL-MCNC: 1.9 MG/DL (ref 1.6–2.6)
MCH RBC QN AUTO: 29.6 PG (ref 24–34)
MCHC RBC AUTO-ENTMCNC: 33.2 G/DL (ref 31–37)
MCV RBC AUTO: 89.1 FL (ref 74–97)
MONOCYTES # BLD: 0.6 K/UL (ref 0.05–1.2)
MONOCYTES NFR BLD: 6 % (ref 3–10)
NEUTS SEG # BLD: 7.1 K/UL (ref 1.8–8)
NEUTS SEG NFR BLD: 72 % (ref 40–73)
PLATELET # BLD AUTO: 273 K/UL (ref 135–420)
PMV BLD AUTO: 9.3 FL (ref 9.2–11.8)
POTASSIUM SERPL-SCNC: 3.3 MMOL/L (ref 3.5–5.5)
RBC # BLD AUTO: 4.05 M/UL (ref 4.2–5.3)
SODIUM SERPL-SCNC: 140 MMOL/L (ref 136–145)
WBC # BLD AUTO: 9.9 K/UL (ref 4.6–13.2)

## 2019-01-20 PROCEDURE — 74011636637 HC RX REV CODE- 636/637: Performed by: STUDENT IN AN ORGANIZED HEALTH CARE EDUCATION/TRAINING PROGRAM

## 2019-01-20 PROCEDURE — 36415 COLL VENOUS BLD VENIPUNCTURE: CPT

## 2019-01-20 PROCEDURE — 74011250637 HC RX REV CODE- 250/637: Performed by: INTERNAL MEDICINE

## 2019-01-20 PROCEDURE — 74011000250 HC RX REV CODE- 250: Performed by: STUDENT IN AN ORGANIZED HEALTH CARE EDUCATION/TRAINING PROGRAM

## 2019-01-20 PROCEDURE — 97530 THERAPEUTIC ACTIVITIES: CPT

## 2019-01-20 PROCEDURE — 74011250637 HC RX REV CODE- 250/637: Performed by: FAMILY MEDICINE

## 2019-01-20 PROCEDURE — 94640 AIRWAY INHALATION TREATMENT: CPT

## 2019-01-20 PROCEDURE — 83735 ASSAY OF MAGNESIUM: CPT

## 2019-01-20 PROCEDURE — 85025 COMPLETE CBC W/AUTO DIFF WBC: CPT

## 2019-01-20 PROCEDURE — 74011250637 HC RX REV CODE- 250/637: Performed by: STUDENT IN AN ORGANIZED HEALTH CARE EDUCATION/TRAINING PROGRAM

## 2019-01-20 PROCEDURE — 74011636637 HC RX REV CODE- 636/637: Performed by: FAMILY MEDICINE

## 2019-01-20 PROCEDURE — 80048 BASIC METABOLIC PNL TOTAL CA: CPT

## 2019-01-20 PROCEDURE — 74011250636 HC RX REV CODE- 250/636: Performed by: STUDENT IN AN ORGANIZED HEALTH CARE EDUCATION/TRAINING PROGRAM

## 2019-01-20 PROCEDURE — 65270000029 HC RM PRIVATE

## 2019-01-20 PROCEDURE — 82962 GLUCOSE BLOOD TEST: CPT

## 2019-01-20 PROCEDURE — 97165 OT EVAL LOW COMPLEX 30 MIN: CPT

## 2019-01-20 RX ORDER — POTASSIUM CHLORIDE 1.5 G/1.77G
20 POWDER, FOR SOLUTION ORAL
Status: DISCONTINUED | OUTPATIENT
Start: 2019-01-20 | End: 2019-01-20

## 2019-01-20 RX ORDER — LEVOFLOXACIN 750 MG/1
750 TABLET ORAL
Status: DISCONTINUED | OUTPATIENT
Start: 2019-01-21 | End: 2019-01-21 | Stop reason: HOSPADM

## 2019-01-20 RX ORDER — POTASSIUM CHLORIDE 20 MEQ/1
20 TABLET, EXTENDED RELEASE ORAL ONCE
Status: COMPLETED | OUTPATIENT
Start: 2019-01-20 | End: 2019-01-20

## 2019-01-20 RX ADMIN — GUAIFENESIN 1200 MG: 600 TABLET, EXTENDED RELEASE ORAL at 21:38

## 2019-01-20 RX ADMIN — POTASSIUM CHLORIDE 20 MEQ: 1.5 POWDER, FOR SOLUTION ORAL at 09:50

## 2019-01-20 RX ADMIN — MONTELUKAST SODIUM 10 MG: 10 TABLET, FILM COATED ORAL at 21:38

## 2019-01-20 RX ADMIN — ASPIRIN 81 MG: 81 TABLET, COATED ORAL at 09:23

## 2019-01-20 RX ADMIN — LISINOPRIL 20 MG: 20 TABLET ORAL at 09:23

## 2019-01-20 RX ADMIN — LISINOPRIL 20 MG: 20 TABLET ORAL at 17:50

## 2019-01-20 RX ADMIN — IPRATROPIUM BROMIDE AND ALBUTEROL SULFATE 3 ML: .5; 3 SOLUTION RESPIRATORY (INHALATION) at 07:58

## 2019-01-20 RX ADMIN — HEPARIN SODIUM 5000 UNITS: 5000 INJECTION INTRAVENOUS; SUBCUTANEOUS at 21:38

## 2019-01-20 RX ADMIN — LEVOFLOXACIN 750 MG: 5 INJECTION, SOLUTION INTRAVENOUS at 09:17

## 2019-01-20 RX ADMIN — HYDROCHLOROTHIAZIDE 12.5 MG: 25 TABLET ORAL at 09:23

## 2019-01-20 RX ADMIN — IPRATROPIUM BROMIDE AND ALBUTEROL SULFATE 3 ML: .5; 3 SOLUTION RESPIRATORY (INHALATION) at 16:04

## 2019-01-20 RX ADMIN — POTASSIUM CHLORIDE 20 MEQ: 20 TABLET, EXTENDED RELEASE ORAL at 14:01

## 2019-01-20 RX ADMIN — Medication 10 ML: at 05:47

## 2019-01-20 RX ADMIN — Medication 10 ML: at 21:38

## 2019-01-20 RX ADMIN — PANTOPRAZOLE SODIUM 40 MG: 40 TABLET, DELAYED RELEASE ORAL at 09:23

## 2019-01-20 RX ADMIN — INSULIN LISPRO 3 UNITS: 100 INJECTION, SOLUTION INTRAVENOUS; SUBCUTANEOUS at 09:17

## 2019-01-20 RX ADMIN — POTASSIUM CHLORIDE 20 MEQ: 1.5 POWDER, FOR SOLUTION ORAL at 11:00

## 2019-01-20 RX ADMIN — GUAIFENESIN 1200 MG: 600 TABLET, EXTENDED RELEASE ORAL at 09:23

## 2019-01-20 RX ADMIN — INSULIN LISPRO 15 UNITS: 100 INJECTION, SOLUTION INTRAVENOUS; SUBCUTANEOUS at 17:49

## 2019-01-20 RX ADMIN — IPRATROPIUM BROMIDE AND ALBUTEROL SULFATE 3 ML: .5; 3 SOLUTION RESPIRATORY (INHALATION) at 22:03

## 2019-01-20 RX ADMIN — INSULIN LISPRO 12 UNITS: 100 INJECTION, SOLUTION INTRAVENOUS; SUBCUTANEOUS at 21:38

## 2019-01-20 RX ADMIN — HEPARIN SODIUM 5000 UNITS: 5000 INJECTION INTRAVENOUS; SUBCUTANEOUS at 11:03

## 2019-01-20 RX ADMIN — Medication 10 ML: at 17:50

## 2019-01-20 RX ADMIN — INSULIN GLARGINE 30 UNITS: 100 INJECTION, SOLUTION SUBCUTANEOUS at 21:38

## 2019-01-20 RX ADMIN — INSULIN LISPRO 12 UNITS: 100 INJECTION, SOLUTION INTRAVENOUS; SUBCUTANEOUS at 12:31

## 2019-01-20 RX ADMIN — PREDNISONE 40 MG: 20 TABLET ORAL at 09:50

## 2019-01-20 NOTE — PROGRESS NOTES
NUTRITION    Nursing Referral: [facility]     RECOMMENDATIONS / PLAN:     - Continue current diet order.  - Continue RD inpatient monitoring and evaluation. NUTRITION INTERVENTIONS & DIAGNOSIS:     [x] Meals/snacks: modify composition    Nutrition Diagnosis: Overweight/obese related to predicted excessive energy intake over time as evidenced by BMI of 44.4 kg/(m^2) upon admission    ASSESSMENT:     Pt reports decreased appetite with fair meal intake x several days PTA due to not feeling well. Feels as if she has lost a few lbs from not eating as much. Currently with good appetite and meal intake. Tolerating diet. Average po intake adequate to meet patients estimated nutritional needs:   [x] Yes     [] No   [] Unable to determine at this time    Diet: DIET DIABETIC CONSISTENT CARB Regular; No Conc. Sweets      Food Allergies: Fish containing products  Current Appetite:   [x] Good     [] Fair     [] Poor     [] Other:  Appetite/meal intake prior to admission:   [] Good     [x] Fair     [] Poor     [] Other:  Feeding Limitations:  [] Swallowing difficulty    [] Chewing difficulty    [] Other:  Current Meal Intake: No data found.     BM: 1/19  Skin Integrity: WDL  Edema:   [] No     [x] Yes   Pertinent Medications: Reviewed: HCTZ, lantus, SSI, zofran, 60 mEq KCL, steroid    Recent Labs     01/20/19  0404 01/19/19  0651 01/18/19  1805    138 139   K 3.3* 4.2 3.8    103 102   CO2 27 25 29   * 341* 233*   BUN 18 16 11   CREA 0.87 1.10 0.93   CA 9.1 9.2 9.2   MG 1.9 2.0  --    PHOS  --  3.1  --    ALB  --   --  3.7   SGOT  --   --  25   ALT  --   --  52     No intake or output data in the 24 hours ending 01/20/19 1256    Anthropometrics:  Ht Readings from Last 1 Encounters:   01/19/19 5' 3\" (1.6 m)     Last 3 Recorded Weights in this Encounter    01/19/19 0054 01/19/19 0514 01/20/19 0431   Weight: 113.3 kg (249 lb 12.5 oz) 113.3 kg (249 lb 12.5 oz) 113.6 kg (250 lb 6.4 oz)     Body mass index is 44.36 kg/m². Obese Class III    Weight History: Pt reports some weight loss over the years but unsure how much. Per chart review pt with stable weight hx PTA. Weight Metrics 1/20/2019 1/7/2019 12/11/2018 11/20/2018 10/30/2018 10/7/2018 10/2/2018   Weight 250 lb 6.4 oz 252 lb 254 lb 8 oz 254 lb 3.2 oz 254 lb 252 lb 252 lb   BMI 44.36 kg/m2 44.64 kg/m2 45.08 kg/m2 45.03 kg/m2 44.99 kg/m2 44.64 kg/m2 44.64 kg/m2        Admitting Diagnosis: Acute exacerbation of chronic obstructive pulmonary disease (COPD) ([facility])  Dyspnea  Acute exacerbation of chronic obstructive pulmonary disease (COPD) [address] [address]  Pertinent PMHx: COPD, DM, GERD, HLD, HTN, SHERRIE    Education Needs:        [x] None identified  [] Identified - Not appropriate at this time  []  Identified and addressed - refer to education log  Learning Limitations:   [x] None identified  [] Identified    Cultural, Sikhism & ethnic food preferences:  [x] None identified    [] Identified and addressed     ESTIMATED NUTRITION NEEDS:     500 kcal deficit to promote weight loss  Calories: 7934-7110 kcal (MSJx1.2-1.3) based on  [x] Actual BW: 114 kg      [] IBW   Protein:  gm (0.8-1 gm/kg) based on  [x] Actual BW      [] IBW   Fluid: 1 mL/kcal     MONITORING & EVALUATION:     Nutrition Goal(s):   1. Po intake of meals will meet >75% of patient estimated nutritional needs within the next 7 days. Outcome:  [] Met/Ongoing    []  Not Met    [x] New/Initial Goal   2. Weight loss of 1-2 lbs over the next 7 days.    Outcome:  [] Met/Ongoing    []  Not Met    [x] New/Initial Goal     Monitoring:   [x] Food and beverage intake   [x] Diet order   [x] Nutrition-focused physical findings   [x] Treatment/therapy   [x] Weight   [] Enteral nutrition intake        Previous Recommendations (for follow-up assessments only):     []   Implemented       []   Not Implemented (RD to address)      [] No Longer Appropriate     [] No Recommendation Made     Discharge Planning: diabetic diet   [x] Participated in care planning, discharge planning, & interdisciplinary rounds as appropriate      Imer Tolbert RD   Pager: [phone]

## 2019-01-20 NOTE — PROGRESS NOTES
Problem: Falls - Risk of  Goal: *Absence of Falls  Document Ricarda Fall Risk and appropriate interventions in the flowsheet.   Outcome: Progressing Towards Goal  Fall Risk Interventions:            Medication Interventions: Patient to call before getting OOB, Teach patient to arise slowly         History of Falls Interventions: Door open when patient unattended, Evaluate medications/consider consulting pharmacy

## 2019-01-20 NOTE — CONSULTS
[facility]  Pulmonary, Critical Care, and Sleep Medicine      Pulmonary  Progress note    Name: Leyda Sal     : 1960     Date: 2019        Subjective:     Patient is a 60 y.o. female  with PMH COPD on home O2, HTN, T2DM, obesity, Diastolic CHF, SHERRIE, now presenting with complaint of cough and increasing dyspnea.   19   Feels 50% improved- still with cough and difficulty expectorating mucus  Denies chest pain  Some wheezing  Did get OOB  Slept fair with CPAP on  Denies new compalints    HPI:  Patient has had 6 hospitalizations for COPD exacerbations over the past year. She has not had to be intubated. Patient describes the course of this exacerbation as different in that it started with a (presumed) viral illness. In December, patient saw Dr. Lauren Doan at [facility] when she had complaints of rhinorrhea and sinus congestion for which she was begun on Augmentin. Patient felt better after being on the augmentin for three days but then began to feel worse again. On , she was seen by Dr. Cuco Reynolds at [facility] where she continued to have URI symptoms and was flu negative. She saw her pulmonologist, Dr. Court Gilliland, several days later and was given a five day course of steroids and a sputum culture was obtained. She continued to deteriorate and recently sought care at the ED where she was prescribed doxycyline. She took doxy for three days before the results of her sputum culture grew out Serratia marcescens and Dr. Court Gilliland switched her to levaquin. She has taken levaquin for the past two days. She continues to have a voracious cough that has caused her to become quite fatigued. Her sputum over this time period has changed from white to a light green to a dark green and is now yellow. She has needed her home O2 at all times whereas she would typically only use it with exertional activities. She has been using her rescue inhaler and neb treatments with increased frequency. She says earlier today she was saturating 91% on RA and approximately 96% on 2L oxygen.      On presentation to the ED, she was afebrile, hypertensive with SBP in the 180s and tachypneic on RA but saturating well. She received several neb treatments, a 125 mg solu-medrol injection and her magnesium was repleted. She saturated well on 2L NC and her BPs have been stable in the 120s-150s. Denies GERD  No fevers or chills. She endorsed SOB and a minimally productive cough. Endorsed slight nausea but no vomiting. In July she had IgE checked- 157. Allergen profile - Negative      Asthma History;  Frequency of symptoms: almost daily Cough, intermittent wheezing, in day time and night time with seasonal- spring and fall worsening  Triggers: environmental, smoke, dust,   No PET's. No change in home environment  Relief from-  less than twice a week and daily  Peak Flows: personal best  H/o ER visits  No H/o previous intubation  H/o nasal congestion, postnasal drainage, sneezing  H/o itchy eyes, skin rash, itching  H/o nasal /sinus surgery  No H/o Aspirin allergy  No H/o travel  No H/o smoking, recreational drug use  H/o Sleep related symptoms- snoring, awakening due to SOB, chiking, wheezing  No PETs- Cats, dogs, birds  Occupational: no exposures    Allergies   Allergen Reactions    Ancef [Cefazolin] Hives    Contrast Agent [Iodine] Anaphylaxis, Shortness of Breath and Swelling     Needs pre-medication for IV contrast with Benadryl, Solu-Medrol    Fish Containing Products Anaphylaxis     Pt states she had a severe allergic reaction at 9 y/o.  Metformin Other (comments)     Abdominal pain, diarrhea.     Codeine Other (comments)     Altered mental status     Current Facility-Administered Medications   Medication Dose Route Frequency Provider Last Rate Last Dose    potassium chloride (KLOR-CON) packet for solution 20 mEq  20 mEq Oral Q2H Davion Marroquin MD   20 mEq at 01/20/19 1100    guaiFENesin ER [facility]) tablet 1,200 mg  1,200 mg Oral Q12H Caryle Friar A, MD   1,200 mg at 01/20/19 8692    aspirin delayed-release tablet 81 mg  81 mg Oral DAILY Mesha HERNANDEZ, DO   81 mg at 01/20/19 6927    famotidine (PEPCID) tablet 20 mg  20 mg Oral BID PRN Ras Miles, DO        hydroCHLOROthiazide (HYDRODIURIL) tablet 12.5 mg  12.5 mg Oral DAILY MilesRas arce, DO   12.5 mg at 01/20/19 3282    lisinopril (PRINIVIL, ZESTRIL) tablet 20 mg  20 mg Oral BID Ras Miles DO   20 mg at 01/20/19 0460    montelukast (SINGULAIR) tablet 10 mg  10 mg Oral QHS Ras Miles, DO   10 mg at 01/19/19 2138    pantoprazole (PROTONIX) tablet 40 mg  40 mg Oral ACB Ras Miles, DO   40 mg at 01/20/19 9876    simethicone (MYLICON) tablet 80 mg  80 mg Oral QID PRN Mesha HERNANDEZ DO        sodium chloride (NS) flush 5-40 mL  5-40 mL IntraVENous Q8H MilesRas arce DO   10 mL at 01/20/19 0547    sodium chloride (NS) flush 5-40 mL  5-40 mL IntraVENous PRN Ras Miles DO        naloxone [facility]) injection 0.4 mg  0.4 mg IntraVENous PRN Ras Miles, DO        acetaminophen (TYLENOL) tablet 650 mg  650 mg Oral Q4H PRN Ras Miles, DO        ondansetron (ZOFRAN ODT) tablet 4 mg  4 mg Oral Q4H PRN Rsa Miles, DO        heparin (porcine) injection 5,000 Units  5,000 Units SubCUTAneous Q12H MilesRas arce DO   5,000 Units at 01/20/19 1103    promethazine (PHENERGAN) 6.25 mg/5 mL syrup 25 mg  25 mg Oral Q6H PRN Ras Miles, DO        albuterol-ipratropium (DUO-NEB) 2.5 MG-0.5 MG/3 ML  3 mL Nebulization Q4H RT Ras Miles, DO   3 mL at 01/20/19 0758    levoFLOXacin (LEVAQUIN) 750 mg in D5W IVPB  750 mg IntraVENous Q24H MilesRas arce,  mL/hr at 01/20/19 0917 750 mg at 01/20/19 0917    predniSONE (DELTASONE) tablet 40 mg  40 mg Oral DAILY WITH BREAKFAST Ras Miles, DO   40 mg at 01/20/19 0950    insulin lispro (HUMALOG) injection SubCUTAneous AC&HS Chico Thomson MD   12 Units at 01/20/19 1231    glucose chewable tablet 16 g  4 Tab Oral PRN Chales Days T, DO        glucagon (GLUCAGEN) injection 1 mg  1 mg IntraMUSCular PRN Ras Miles, DO        dextrose (D50W) injection syrg 12.5-25 g  25-50 mL IntraVENous PRN Miles, Ras T, DO        insulin glargine (LANTUS) injection 30 Units  30 Units SubCUTAneous QHS Ras Miles, DO   30 Units at 01/19/19 6899     Review of Systems:    Constitutional: Positive for malaise/fatigue. Negative for chills and fever. HENT: Positive for congestion, sinus pain and sore throat. Eyes: Negative for double vision and photophobia. Respiratory: Positive for cough, sputum production, shortness of breath and wheezing. Negative for hemoptysis. Cardiovascular: Positive for chest pain. Negative for palpitations and leg swelling. Gastrointestinal: Positive for abdominal pain, heartburn and nausea. Negative for constipation, diarrhea and vomiting. Genitourinary: Negative for dysuria, hematuria and urgency. Musculoskeletal: Positive for myalgias. Negative for joint pain. Skin: Negative for itching and rash. Neurological: Negative for dizziness and headaches. Psychiatric/Behavioral: Negative for depression. The patient is not nervous/anxious. Objective:     Visit Vitals  /81 (BP 1 Location: Left arm, BP Patient Position: Sitting)   Pulse 72   Temp 97.2 °F (36.2 °C)   Resp 18   Ht 5' 3\" (1.6 m)   Wt 113.6 kg (250 lb 6.4 oz)   SpO2 98%   Breastfeeding?  No   BMI 44.36 kg/m²        Physical Exam:   GENERAL: well developed and in  moderate distress  HEENT:  PERRL, EOMI, no alar flaring or epistaxis, oral mucosa moist without cyanosis  NECK:  no jugular vein distention, no retractions, no thyromegaly or masses  LUNGS: faint wheezes bilaterally  HEART:  Regular rate and rhythm with no M,G,R; no edema is present  ABDOMEN:  soft with no tenderness, bowel sounds present  EXTREMITIES:  warm with no cyanosis  SKIN:  no jaundice or ecchymosis  NEUROLOGIC:  alert and oriented, grossly non-focal    Data review:   Sputum culture- 1/14/2019  Serratia marcescens (1)      Antibiotic Interpretation Method Status     Cefazolin ($) Resistant JENNIFER Final     Cefepime ($$) Susceptible JENNIFER Final     Ceftazidime ($) Susceptible JENNIFER Final     Ceftriaxone ($) Susceptible JENNIFER Final     Ciprofloxacin ($) Susceptible JENNIFER Final     Gentamicin ($) Susceptible JENNIFER Final     Levofloxacin ($) Susceptible JENNIFER Final     Tobramycin ($) Susceptible JENNIFER Final     Trimeth-Sulfamethoxa Susceptible JENNIFER Final          Blood Eosinophils: no eosinophilia  IgE level: 157  Allergy testing:negative  [place] allergen panel:negative  Sputum eosinophils: not done    PFT:  Date FEV1/FVC FEV1%Best ZDR99-88% FVC%best TLC% RV% VC% DLCO%   5/27/2016 54 1.32/51% 0.42/17% 2.54/77% 111 202 62 69                Methacholine challenge: not done    Imaging:  I have personally reviewed the patients radiographs and have reviewed the reports:  XR Results (most recent):  Results from Hospital Encounter encounter on 01/18/19   XR CHEST SNGL V    Narrative PA CHEST    CPT CODE: 29732    INDICATION: Shortness of breath. COPD exacerbation. .    COMPARISON: 1/10/2019 and 6/2/2018 PA and lateral.    TECHNIQUE: Single PA chest radiograph is reviewed. FINDINGS:    Mild streaky bibasilar opacities similar to prior studies, likely atelectasis  and/or scar. No evidence of pneumothorax. The costophrenic sulci appear sharp. The cardiac silhouette is upper normal in size. No acute osseous abnormalities identified. Impression IMPRESSION:     Mild streaky bibasilar opacities, likely atelectasis and/or scar. IMPRESSION:   · Asthma- COPD overlap syndrome- difficult to control T2 low. Severity- moderate with poor Control. Has had several hospitalizations over the past year for COPD-Asthma exacerbations (this is the 6th this year) -Admitted for acute exacerbation failed outpatient therapy with antibiotics and oral steroids. Now with  evidence for infection - serratia and changed to appropriate antibiotics. Triggers:environmental allergens. ? 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 EVMS- ENT following  · Comorbid conditions- Obesity, DM           · Will continue to optimize in hospital treatment with nebulized bronchodilators, steroids and stepped up Bronchial hygiene protocol  · Use multi targeted trigger optimization strategy  · Agree with Levaquin to treat Serratia bronchitis  · Bronchial hygiene- add mucolytics and flutter device  · Consider chronic Macrolide therapy as antiinflammatory treatment  · IgE, allergen profile reviewed. Not a candidate for biologics  · Trigger control- rhinitis, GERD, environmental allergens  · Continue Flonase  · Discussed concept of controllers, rescue  · Spiriva Respimet and Advair can be resumed at discharge  · Rescue inhaler-albuterol  · Peak flow monitoring  · Action plan given with discussion about Green, Yellow, Red zone actions  · Oxygen and CPAP for home use  · Preventive vaccinations: Influenza, Pneumovax  · Will follow       Health maintenance screens deferred to Primary care provider. High complexity decision making was performed during the evaluation of this patient at high risk for decompensation with multiple organ involvement     Above mentioned total time spent on reviewing the case/medical record/data/notes/EMR/patient examination/documentation/coordinating care with nurse/consultants, exclusive of procedures with complex decision making performed and > 50% time spent in face to face evaluation.      Tray Torres MD

## 2019-01-20 NOTE — PROGRESS NOTES
[facility] [facility]  Progress Note  Medical Record Number: [id number]    Patient: Earlene Moreno  YOB: 1960   Age: 60 y.o. Sex: female   DOA: 1/18/2019     Subjective  Acute events overnight and patient complaints:  -Patient still feels slightly SOB  -Has a non-productive cough  -Would like to stay another night and go home Monday if possible    ROS  Resp: Coughing but non-productive. SOB as above     Objective:  Visit Vitals  /66 (BP 1 Location: Left arm, BP Patient Position: At rest)   Pulse 87   Temp 97.7 °F (36.5 °C)   Resp 20   Ht 5' 3\" (1.6 m)   Wt 113.3 kg (249 lb 12.5 oz)   SpO2 95%   Breastfeeding? No   BMI 44.25 kg/m²     Physical Exam  General: NAD. Sitting up in bed  HEENT: Mucous membranes appears moist. Using CPAP machine when I walked in  CV: RRR. No murmurs or gallops  RESP:  Mild expiratory wheezes appreciated bilaterally in the posterior lung fields  ABD: NABS  Ext: Radial pulses 1+ bilaterally    Lab/Data Reviewed:  Basic Chemistry  Recent Labs     01/19/19  0651 01/18/19  1805   * 233*    139   K 4.2 3.8    102   CO2 25 29   BUN 16 11   CREA 1.10 0.93   CA 9.2 9.2   MG 2.0  --    PHOS 3.1  --        Liver Enzymes  Recent Labs     01/18/19  1805   TP 7.4   ALB 3.7   GLOB 3.7   AGRAT 1.0   SGOT 25   AP 95       Complete Blood Count with Differential  Recent Labs     01/19/19  0651 01/18/19  1805   WBC 11.7 6.9   RBC 4.45 4.53   HGB 13.4 13.8   HCT 39.4 39.7    302   GRANS 90* 60   LYMPH 9* 33   EOS 0 2     Microbiology  No results for input(s): SDES, CULT in the last 72 hours. No results for input(s): CULT in the last 72 hours.     Scheduled Medications Reviewed:  Current Facility-Administered Medications   Medication Dose Route Frequency    guaiFENesin ER (MUCINEX) tablet 1,200 mg  1,200 mg Oral Q12H    aspirin delayed-release tablet 81 mg  81 mg Oral DAILY    hydroCHLOROthiazide (HYDRODIURIL) tablet 12.5 mg  12.5 mg Oral DAILY    lisinopril (PRINIVIL, ZESTRIL) tablet 20 mg  20 mg Oral BID    montelukast (SINGULAIR) tablet 10 mg  10 mg Oral QHS    pantoprazole (PROTONIX) tablet 40 mg  40 mg Oral ACB    sodium chloride (NS) flush 5-40 mL  5-40 mL IntraVENous Q8H    heparin (porcine) injection 5,000 Units  5,000 Units SubCUTAneous Q12H    albuterol-ipratropium (DUO-NEB) 2.5 MG-0.5 MG/3 ML  3 mL Nebulization Q4H RT    levoFLOXacin (LEVAQUIN) 750 mg in D5W IVPB  750 mg IntraVENous Q24H    predniSONE (DELTASONE) tablet 40 mg  40 mg Oral DAILY WITH BREAKFAST    insulin lispro (HUMALOG) injection   SubCUTAneous AC&HS    insulin glargine (LANTUS) injection 30 Units  30 Units SubCUTAneous QHS       Imaging   CXR 1/18: Mild streaky bilateral opacities. Likely atelectasis vs scar    Microbiology   Sputum Cultures 1/14: Few Serratia Marcescens     EKG/Telemetry  Per Tele: Sinus Rhythm HR 80s    Assessment and Plan  60year old female with history of COPD on Home O2, hypertension, insulin dependent DM, obesity, diastolic CHF and SHERRIE who is admitted for a COPD exacerbation. COPD Exacerbation: History of moderate to severe COPD/GOLD2. On home O2 with multiple admissions within the past 12 months. She was swabbed for Influenza earlier this month and was negative. Was started on Doxycycline on 1/10/18 for presumed COPD exacerbation and treated in the outpatient setting and was swapped for Levofloxacin on 1/16 PO as she grew Serratia on sputum cultures. SOB worsened and she was admitted on 1/18 for COPD exacerbation requiring hospitalization  -F/U Pulmonary recommendations  -Continue Duo-Nebs q4 hours via RT  -Continue Levaquin 750mg qD IV for now. Consider switching to PO version when patient's respiratory status stabilizes.  Likely needs 7-10 days of total treatment  -Continue Prednisone 40mg qD for a total of 5 doses (1/19-1/23) but patient historically has required longer tapers  -Continue Singulair 10mg qD  -Continue supplemental O2 with NC and CPAP overnight  -Goal SPO2 >92%  -Hold home Advair and Spiriva while hospitalized as she is getting Duo-Nebs  -Mucinex for cough q12 hours. Can also use Promethazine  -F/U BMP, CBC and Mg    Diastolic CHF with Preserved Ef  -Continue ASA 81mg qD  -Continue Lisinopril 20mg qD    Hypokalemia  -K 3.0 this morning likely secondary to continued albuterol use  -Will give oral K 20mEq q2 hours for 4 doses    Hypertension  -Continue Lisinopril 20mg qD  -Continue HCTZ 12.5mg qD    Insulin Dependent DM: HbA1c 8.0  -Continue home Lantus 30 units qHS  -Sliding Scale Insulin    SHERRIE  -Continue CPAP overnight    GERD  -Holding home Omeprazole 40mg qD and ordering Pantoprazole 40mg qD instead  -Continue home Famotidine 20mg BID PRN  0Simethicone 80mg QID PRN for GI discomfort    Diet: Diabetic Diet  DVT Prophylaxis: SQH  Code Status: Full  Point of Contact: Sade Gudino (Daughter): [phone]    HAYDEN Reyes MD, PGY-3  [facility] Medicine  January 20, 2019 7:49 AM

## 2019-01-20 NOTE — PROGRESS NOTES
Bedside shift change report given to Kristi Gomes RN (oncoming nurse) by Nicolas Devine (offgoing nurse). Report included the following information SBAR, Kardex, MAR and Cardiac Rhythm NSR. Mauricio Burns

## 2019-01-20 NOTE — PROGRESS NOTES
Problem: Self Care Deficits Care Plan (Adult)  Goal: *Acute Goals and Plan of Care (Insert Text)  Outcome: Resolved/Met Date Met: 01/20/19  Occupational Therapy EVALUATION/discharge    Patient: Dg Brasher (37 y.o. female)  Date: 1/20/2019  Primary Diagnosis: Acute exacerbation of chronic obstructive pulmonary disease (COPD) (HCC)  Dyspnea  Acute exacerbation of chronic obstructive pulmonary disease (COPD) ([facility] [address]       Precautions:  Fall    ASSESSMENT AND RECOMMENDATIONS:  Based on the objective data described below, the patient seen for OT evaluation, seated on EOB upon arrival. Patient reported she has a nurse M-F from 9a-1p that assists with bathing and IADLs. Patient was able to stand with mod I and perform functional mobility with supervision and no AD. With O2 in place, patient noted to be slightly SOB post mobility, O2 sats were 96% on 2L of O2. Patient needed SBA/supervision with adaptive strategies to jayjay socks seated on EOB. Patient reported she has a sock aide and reacher for LE ADLs if needed (diffcult days) at home. Patient educated on deep breathing techniques, seated ADLs for safety, and activity pacing to implement during ADLs/functional mobility. Will sign off and defer to PT for mobility. Skilled acute care occupational therapy is not indicated at this time. Discharge Recommendations: Home Health  Further Equipment Recommendations for Discharge: Patient already has a BSC and a shower chair      Barriers to Learning/Limitations: none    COMPLEXITY     Eval Complexity: History: LOW Complexity : Brief history review ; Examination: LOW Complexity : 1-3 performance deficits relating to physical, cognitive , or psychosocial skils that result in activity limitations and / or participation restrictions ;  Decision Making:LOW Complexity : No comorbidities that affect functional and no verbal or physical assistance needed to complete eval tasks  Assessment: Low Complexity        G-CODES: Self Care  Current  CI= 1-19%   Goal  CI= 1-19%   D/C  CI= 1-19%. The severity rating is based on the Level of Assistance required for Functional Mobility and ADLs. SUBJECTIVE:   Patient stated I have a nurse that helps me.     OBJECTIVE DATA SUMMARY:     Past Medical History:   Diagnosis Date    Asthma     Chronic lung disease     COPD     Cystocele, midline     Diabetes mellitus ([address]     GERD (gastroesophageal reflux disease)     Hidradenitis suppurativa     Hyperlipidemia     Hypertension     SHERRIE on CPAP     CPAP    Stress incontinence      Past Surgical History:   Procedure Laterality Date    BREAST SURGERY PROCEDURE UNLISTED      Right breast benign tumor removal    HX APPENDECTOMY      HX CHOLECYSTECTOMY      HX DILATION AND CURETTAGE      Dysfunctional uterine bleeding, thought 2/2 fibroids    HX TUBAL LIGATION       Prior Level of Function/Home Situation: Patient reported she was independent in basic self care tasks and functional mobility PTA, with assistance from nurse for bathing and IADLs. Home Situation  Home Environment: Private residence  # Steps to Enter: 2  One/Two Story Residence: Two story  # of Interior Steps: 14  Height of Each Step (in): 7 inches  Interior Rails: Right  Lift Chair Available: No  Living Alone: No  Support Systems: Family member(s)  Patient Expects to be Discharged to[de-identified] Private residence  Current DME Used/Available at Home: Shower chair, Commode, bedside, Oxygen, portable  Tub or Shower Type: Tub/Shower combination  [x]     Right hand dominant   []     Left hand dominant  Cognitive/Behavioral Status:  Neurologic State: Alert  Orientation Level: Oriented X4  Cognition: Follows commands    Skin: No skin changes noted    Edema: No edema noted    Vision/Perceptual:       Acuity: Within Defined Limits      Coordination:  Coordination: Within functional limits(BUEs)       Balance:  Sitting: Intact  Standing: Intact; Without support    Strength:  Strength: Within functional limits(BUEs)     Tone & Sensation:  Tone: Normal(BUEs)  Sensation: Intact(BUEs)     Range of Motion:  AROM: Within functional limits(BUEs)     Functional Mobility and Transfers for ADLs:  Bed Mobility:    Patient seated on EOB upon arrival   Scooting: Supervision  Transfers:  Sit to Stand: Modified independent    Bathroom Mobility: Supervision/set up(simulated with no AD and O2 in place)    ADL Assessment:(clinical judgement)  Feeding: Independent    Oral Facial Hygiene/Grooming: Supervision    Bathing: Supervision    Upper Body Dressing: Modified independent    Lower Body Dressing: Supervision    Toileting: Supervision     Pain:  Pt reports 0/10 pain or discomfort prior to treatment.    Pt reports 0/10 pain or discomfort post treatment. Activity Tolerance:   Fair    Please refer to the flowsheet for vital signs taken during this treatment. After treatment:   []  Patient left in no apparent distress sitting up in chair  [x]  Patient left in no apparent distress in bed  [x]  Call bell left within reach  [x]  Nursing notified/present  []  Caregiver present  []  Bed alarm activated    COMMUNICATION/EDUCATION:   Communication/Collaboration:  []      Home safety education was provided and the patient/caregiver indicated understanding. [x]      Patient/family have participated as able and agree with findings and recommendations. []      Patient is unable to participate in plan of care at this time.     Alejandra Devlin OTR/L  Time Calculation: 24 mins

## 2019-01-20 NOTE — ROUTINE PROCESS
Bedside and Verbal shift change report given to Shelley Gould RN   (oncoming nurse) by Damion Alatorre RN (offgoing nurse). Report included the following information SBAR, Kardex, Intake/Output, MAR and Recent Results. 2145 Dr. Colon Cools on floor to round on patient. Informed of FSBS of 361. No further orders.

## 2019-01-20 NOTE — PROGRESS NOTES
Bedside shift change report given to Shikha Delcid RN (oncoming nurse) by Filiberto Pittman (offgoing nurse). Report included the following information SBAR, Kardex and MAR.

## 2019-01-21 VITALS
TEMPERATURE: 98.7 F | HEIGHT: 63 IN | OXYGEN SATURATION: 93 % | WEIGHT: 250.4 LBS | HEART RATE: 71 BPM | DIASTOLIC BLOOD PRESSURE: 78 MMHG | BODY MASS INDEX: 44.37 KG/M2 | RESPIRATION RATE: 20 BRPM | SYSTOLIC BLOOD PRESSURE: 133 MMHG

## 2019-01-21 LAB
ANION GAP SERPL CALC-SCNC: 6 MMOL/L (ref 3–18)
BASOPHILS # BLD: 0 K/UL (ref 0–0.1)
BASOPHILS NFR BLD: 0 % (ref 0–2)
BUN SERPL-MCNC: 19 MG/DL (ref 7–18)
BUN/CREAT SERPL: 20 (ref 12–20)
CALCIUM SERPL-MCNC: 9.1 MG/DL (ref 8.5–10.1)
CHLORIDE SERPL-SCNC: 103 MMOL/L (ref 100–108)
CO2 SERPL-SCNC: 28 MMOL/L (ref 21–32)
CREAT SERPL-MCNC: 0.93 MG/DL (ref 0.6–1.3)
DIFFERENTIAL METHOD BLD: ABNORMAL
EOSINOPHIL # BLD: 0 K/UL (ref 0–0.4)
EOSINOPHIL NFR BLD: 0 % (ref 0–5)
ERYTHROCYTE [DISTWIDTH] IN BLOOD BY AUTOMATED COUNT: 13.7 % (ref 11.6–14.5)
GLUCOSE BLD STRIP.AUTO-MCNC: 155 MG/DL (ref 70–110)
GLUCOSE BLD STRIP.AUTO-MCNC: 215 MG/DL (ref 70–110)
GLUCOSE SERPL-MCNC: 171 MG/DL (ref 74–99)
HCT VFR BLD AUTO: 36.4 % (ref 35–45)
HGB BLD-MCNC: 12.3 G/DL (ref 12–16)
LYMPHOCYTES # BLD: 3 K/UL (ref 0.9–3.6)
LYMPHOCYTES NFR BLD: 30 % (ref 21–52)
MAGNESIUM SERPL-MCNC: 2 MG/DL (ref 1.6–2.6)
MCH RBC QN AUTO: 30.1 PG (ref 24–34)
MCHC RBC AUTO-ENTMCNC: 33.8 G/DL (ref 31–37)
MCV RBC AUTO: 89.2 FL (ref 74–97)
MONOCYTES # BLD: 0.7 K/UL (ref 0.05–1.2)
MONOCYTES NFR BLD: 6 % (ref 3–10)
NEUTS SEG # BLD: 6.6 K/UL (ref 1.8–8)
NEUTS SEG NFR BLD: 64 % (ref 40–73)
PLATELET # BLD AUTO: 277 K/UL (ref 135–420)
PMV BLD AUTO: 9.4 FL (ref 9.2–11.8)
POTASSIUM SERPL-SCNC: 3.6 MMOL/L (ref 3.5–5.5)
RBC # BLD AUTO: 4.08 M/UL (ref 4.2–5.3)
SODIUM SERPL-SCNC: 137 MMOL/L (ref 136–145)
WBC # BLD AUTO: 10.3 K/UL (ref 4.6–13.2)

## 2019-01-21 PROCEDURE — 97116 GAIT TRAINING THERAPY: CPT

## 2019-01-21 PROCEDURE — 80048 BASIC METABOLIC PNL TOTAL CA: CPT

## 2019-01-21 PROCEDURE — 74011250636 HC RX REV CODE- 250/636: Performed by: STUDENT IN AN ORGANIZED HEALTH CARE EDUCATION/TRAINING PROGRAM

## 2019-01-21 PROCEDURE — 85025 COMPLETE CBC W/AUTO DIFF WBC: CPT

## 2019-01-21 PROCEDURE — 74011636637 HC RX REV CODE- 636/637: Performed by: STUDENT IN AN ORGANIZED HEALTH CARE EDUCATION/TRAINING PROGRAM

## 2019-01-21 PROCEDURE — 94640 AIRWAY INHALATION TREATMENT: CPT

## 2019-01-21 PROCEDURE — 74011250637 HC RX REV CODE- 250/637: Performed by: STUDENT IN AN ORGANIZED HEALTH CARE EDUCATION/TRAINING PROGRAM

## 2019-01-21 PROCEDURE — 82962 GLUCOSE BLOOD TEST: CPT

## 2019-01-21 PROCEDURE — 36415 COLL VENOUS BLD VENIPUNCTURE: CPT

## 2019-01-21 PROCEDURE — 87070 CULTURE OTHR SPECIMN AEROBIC: CPT

## 2019-01-21 PROCEDURE — 83735 ASSAY OF MAGNESIUM: CPT

## 2019-01-21 PROCEDURE — 74011636637 HC RX REV CODE- 636/637: Performed by: FAMILY MEDICINE

## 2019-01-21 PROCEDURE — 74011000250 HC RX REV CODE- 250: Performed by: STUDENT IN AN ORGANIZED HEALTH CARE EDUCATION/TRAINING PROGRAM

## 2019-01-21 PROCEDURE — 74011250637 HC RX REV CODE- 250/637: Performed by: INTERNAL MEDICINE

## 2019-01-21 RX ORDER — LEVOFLOXACIN 750 MG/1
750 TABLET ORAL
Qty: 5 TAB | Refills: 0 | Status: SHIPPED | OUTPATIENT
Start: 2019-01-22 | End: 2019-01-27

## 2019-01-21 RX ORDER — PREDNISONE 10 MG/1
TABLET ORAL
Qty: 20 TAB | Refills: 0 | Status: SHIPPED | OUTPATIENT
Start: 2019-01-21 | End: 2019-04-05

## 2019-01-21 RX ADMIN — LISINOPRIL 20 MG: 20 TABLET ORAL at 09:03

## 2019-01-21 RX ADMIN — LEVOFLOXACIN 750 MG: 750 TABLET, FILM COATED ORAL at 09:03

## 2019-01-21 RX ADMIN — Medication 10 ML: at 05:46

## 2019-01-21 RX ADMIN — HYDROCHLOROTHIAZIDE 12.5 MG: 25 TABLET ORAL at 09:03

## 2019-01-21 RX ADMIN — IPRATROPIUM BROMIDE AND ALBUTEROL SULFATE 3 ML: .5; 3 SOLUTION RESPIRATORY (INHALATION) at 08:05

## 2019-01-21 RX ADMIN — IPRATROPIUM BROMIDE AND ALBUTEROL SULFATE 3 ML: .5; 3 SOLUTION RESPIRATORY (INHALATION) at 04:55

## 2019-01-21 RX ADMIN — PANTOPRAZOLE SODIUM 40 MG: 40 TABLET, DELAYED RELEASE ORAL at 09:03

## 2019-01-21 RX ADMIN — GUAIFENESIN 1200 MG: 600 TABLET, EXTENDED RELEASE ORAL at 09:03

## 2019-01-21 RX ADMIN — PREDNISONE 40 MG: 20 TABLET ORAL at 09:03

## 2019-01-21 RX ADMIN — INSULIN LISPRO 6 UNITS: 100 INJECTION, SOLUTION INTRAVENOUS; SUBCUTANEOUS at 12:38

## 2019-01-21 RX ADMIN — HEPARIN SODIUM 5000 UNITS: 5000 INJECTION INTRAVENOUS; SUBCUTANEOUS at 12:38

## 2019-01-21 RX ADMIN — ASPIRIN 81 MG: 81 TABLET, COATED ORAL at 09:03

## 2019-01-21 RX ADMIN — INSULIN LISPRO 3 UNITS: 100 INJECTION, SOLUTION INTRAVENOUS; SUBCUTANEOUS at 09:02

## 2019-01-21 NOTE — PROGRESS NOTES
[facility]  Pulmonary, Critical Care, and Sleep Medicine      Pulmonary  Progress note    Name: Cristel Gil     : 1960     Date: 2019        Subjective:     Patient is a 60 y.o. female  with PMH COPD on home O2, HTN, T2DM, obesity, Diastolic CHF, SHERRIE, now presenting with complaint of cough and increasing dyspnea.     19   Feels further improved- still with cough and difficulty expectorating mucus. Not a very restful night due to cough  Denies chest pain  Some wheezing  Did get OOB  Slept fair with CPAP on  Denies new compalints    HPI:  Patient has had 6 hospitalizations for COPD exacerbations over the past year. She has not had to be intubated. Patient describes the course of this exacerbation as different in that it started with a (presumed) viral illness. In December, patient saw Dr. Brigette Nelson at [facility] when she had complaints of rhinorrhea and sinus congestion for which she was begun on Augmentin. Patient felt better after being on the augmentin for three days but then began to feel worse again. On , she was seen by Dr. Darlene Alfaro at [facility] where she continued to have URI symptoms and was flu negative. She saw her pulmonologist, Dr. Jasper Ovalle, several days later and was given a five day course of steroids and a sputum culture was obtained. She continued to deteriorate and recently sought care at the ED where she was prescribed doxycyline. She took doxy for three days before the results of her sputum culture grew out Serratia marcescens and Dr. Jasper Ovalle switched her to levaquin. She has taken levaquin for the past two days. She continues to have a voracious cough that has caused her to become quite fatigued. Her sputum over this time period has changed from white to a light green to a dark green and is now yellow. She has needed her home O2 at all times whereas she would typically only use it with exertional activities.    She has been using her rescue inhaler and neb treatments with increased frequency. She says earlier today she was saturating 91% on RA and approximately 96% on 2L oxygen.      On presentation to the ED, she was afebrile, hypertensive with SBP in the 180s and tachypneic on RA but saturating well. She received several neb treatments, a 125 mg solu-medrol injection and her magnesium was repleted. She saturated well on 2L NC and her BPs have been stable in the 120s-150s. Denies GERD  No fevers or chills. She endorsed SOB and a minimally productive cough. Endorsed slight nausea but no vomiting. In July she had IgE checked- 157. Allergen profile - Negative        Allergies   Allergen Reactions    Ancef [Cefazolin] Hives    Contrast Agent [Iodine] Anaphylaxis, Shortness of Breath and Swelling     Needs pre-medication for IV contrast with Benadryl, Solu-Medrol    Fish Containing Products Anaphylaxis     Pt states she had a severe allergic reaction at 7 y/o.  Metformin Other (comments)     Abdominal pain, diarrhea.     Codeine Other (comments)     Altered mental status     Current Facility-Administered Medications   Medication Dose Route Frequency Provider Last Rate Last Dose    levoFLOXacin (LEVAQUIN) tablet 750 mg  750 mg Oral ACB Ras Miles DO        guaiFENesin ER [facility] [place] [facility]\"") tablet 1,200 mg  1,200 mg Oral Q12H Emma HE MD   1,200 mg at 01/20/19 2138    aspirin delayed-release tablet 81 mg  81 mg Oral DAILY Ras Miles, DO   81 mg at 01/20/19 8846    famotidine (PEPCID) tablet 20 mg  20 mg Oral BID PRN Ras Miles DO        hydroCHLOROthiazide (HYDRODIURIL) tablet 12.5 mg  12.5 mg Oral DAILY Ras Miles, DO   12.5 mg at 01/20/19 5575    lisinopril (PRINIVIL, ZESTRIL) tablet 20 mg  20 mg Oral BID Ras Miles DO   20 mg at 01/20/19 1750    montelukast (SINGULAIR) tablet 10 mg  10 mg Oral QHS Ras Miles, DO   10 mg at 01/20/19 2138    pantoprazole (PROTONIX) tablet 40 mg  40 mg Oral ACB Nel Hernandez, DO   40 mg at 01/20/19 1144    simethicone (MYLICON) tablet 80 mg  80 mg Oral QID PRN Tia HERNANDEZ, DO        sodium chloride (NS) flush 5-40 mL  5-40 mL IntraVENous Q8H Miles, Ras HERNANDEZ, DO   10 mL at 01/21/19 0546    sodium chloride (NS) flush 5-40 mL  5-40 mL IntraVENous PRN Ras Miles, DO        naloxone [facility]) injection 0.4 mg  0.4 mg IntraVENous PRN MilesRas arce, DO        acetaminophen (TYLENOL) tablet 650 mg  650 mg Oral Q4H PRN MilesRas arce, DO        ondansetron (ZOFRAN ODT) tablet 4 mg  4 mg Oral Q4H PRN Jd, Ras HERNANDEZ, DO        heparin (porcine) injection 5,000 Units  5,000 Units SubCUTAneous Q12H MilesRas arce, DO   5,000 Units at 01/20/19 2138    promethazine (PHENERGAN) 6.25 mg/5 mL syrup 25 mg  25 mg Oral Q6H PRN Ras Miles DO        albuterol-ipratropium (DUO-NEB) 2.5 MG-0.5 MG/3 ML  3 mL Nebulization Q4H RT Ras Miles DO   3 mL at 01/21/19 0805    predniSONE (DELTASONE) tablet 40 mg  40 mg Oral DAILY WITH BREAKFAST Ras Miles, DO   40 mg at 01/20/19 1044    insulin lispro (HUMALOG) injection   SubCUTAneous AC&HS Ayesha Waller MD   12 Units at 01/20/19 2138    glucose chewable tablet 16 g  4 Tab Oral PRN Tia HERNANDEZ,         glucagon (GLUCAGEN) injection 1 mg  1 mg IntraMUSCular PRN Ras Miles,         dextrose (D50W) injection syrg 12.5-25 g  25-50 mL IntraVENous PRN Ras Miles,         insulin glargine (LANTUS) injection 30 Units  30 Units SubCUTAneous QHS MilesRas arce, DO   30 Units at 01/20/19 2138     Review of Systems:    Constitutional: Positive for malaise/fatigue. Negative for chills and fever. HENT: Positive for congestion, sinus pain and sore throat. Eyes: Negative for double vision and photophobia. Respiratory: Positive for cough, sputum production, shortness of breath and wheezing. Negative for hemoptysis.     Cardiovascular: Positive for chest pain. Negative for palpitations and leg swelling. Gastrointestinal: Positive for abdominal pain, heartburn and nausea. Negative for constipation, diarrhea and vomiting. Genitourinary: Negative for dysuria, hematuria and urgency. Musculoskeletal: Positive for myalgias. Negative for joint pain. Skin: Negative for itching and rash. Neurological: Negative for dizziness and headaches. Psychiatric/Behavioral: Negative for depression. The patient is not nervous/anxious. Objective:     Visit Vitals  /73 (BP 1 Location: Right arm, BP Patient Position: At rest)   Pulse 79   Temp 96.5 °F (35.8 °C)   Resp 17   Ht 5' 3\" (1.6 m)   Wt 113.6 kg (250 lb 6.4 oz)   SpO2 96%   Breastfeeding?  No   BMI 44.36 kg/m²        Physical Exam:   GENERAL: well developed and in  moderate distress  HEENT:  PERRL, EOMI, no alar flaring or epistaxis, oral mucosa moist without cyanosis  NECK:  no jugular vein distention, no retractions, no thyromegaly or masses  LUNGS: faint wheezes bilaterally  HEART:  Regular rate and rhythm with no M,G,R; no edema is present  ABDOMEN:  soft with no tenderness, bowel sounds present  EXTREMITIES:  warm with no cyanosis  SKIN:  no jaundice or ecchymosis  NEUROLOGIC:  alert and oriented, grossly non-focal    Data review:   Sputum culture- 1/14/2019  Serratia marcescens (1)      Antibiotic Interpretation Method Status     Cefazolin ($) Resistant JENNIFER Final     Cefepime ($$) Susceptible JENNIFER Final     Ceftazidime ($) Susceptible JENNIFRE Final     Ceftriaxone ($) Susceptible JENNIFRE Final     Ciprofloxacin ($) Susceptible JENNIFER Final     Gentamicin ($) Susceptible JENNIFER Final     Levofloxacin ($) Susceptible JENNIFER Final     Tobramycin ($) Susceptible JENNIFER Final     Trimeth-Sulfamethoxa Susceptible JENNIFER Final          Blood Eosinophils: no eosinophilia  IgE level: 157  Allergy testing:negative  [facility] allergen panel:negative  Sputum eosinophils: not done    PFT:  Date FEV1/FVC FEV1%Best PHJ34-45% FVC%best TLC% RV% VC% DLCO%   5/27/2016 54 1.32/51% 0.42/17% 2.54/77% 111 202 62 69                Methacholine challenge: not done    Imaging:  I have personally reviewed the patients radiographs and have reviewed the reports:  XR Results (most recent):  Results from Hospital Encounter encounter on 01/18/19   XR CHEST SNGL V    Narrative PA CHEST    CPT CODE: 28445    INDICATION: Shortness of breath. COPD exacerbation. .    COMPARISON: 1/10/2019 and 6/2/2018 PA and lateral.    TECHNIQUE: Single PA chest radiograph is reviewed. FINDINGS:    Mild streaky bibasilar opacities similar to prior studies, likely atelectasis  and/or scar. No evidence of pneumothorax. The costophrenic sulci appear sharp. The cardiac silhouette is upper normal in size. No acute osseous abnormalities identified. Impression IMPRESSION:     Mild streaky bibasilar opacities, likely atelectasis and/or scar. IMPRESSION:   · Asthma- COPD overlap syndrome- difficult to control T2 low. Severity- moderate with poor Control. Has had several hospitalizations over the past year for COPD-Asthma exacerbations (this is the 6th this year) -Admitted for acute exacerbation failed outpatient therapy with antibiotics and oral steroids. Now with  evidence for infection - serratia and changed to appropriate antibiotics. Triggers:environmental allergens. ? 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 EVMS- ENT following  · Comorbid conditions- Obesity, DM           · Continue to optimize in hospital treatment with nebulized bronchodilators, steroids and stepped up Bronchial hygiene protocol.  Getting close to discharge  · Use multi targeted trigger optimization strategy  · Agree with Levaquin to treat Serratia bronchitis  · Bronchial hygiene- add mucolytics and flutter device  · Consider chronic Macrolide therapy as antiinflammatory treatment  · IgE, allergen profile reviewed. Not a candidate for biologics  · Trigger control- rhinitis, GERD, environmental allergens  · Continue Flonase  · Discussed concept of controllers, rescue  · Spiriva Respimet and Advair can be resumed at discharge  · Rescue inhaler-albuterol  · Peak flow monitoring  · Action plan given with discussion about Green, Yellow, Red zone actions  · Oxygen and CPAP for home use  · Preventive vaccinations: Influenza, Pneumovax  · Will follow       Health maintenance screens deferred to Primary care provider. High complexity decision making was performed during the evaluation of this patient at high risk for decompensation with multiple organ involvement     Above mentioned total time spent on reviewing the case/medical record/data/notes/EMR/patient examination/documentation/coordinating care with nurse/consultants, exclusive of procedures with complex decision making performed and > 50% time spent in face to face evaluation.      Molly Pinto MD

## 2019-01-21 NOTE — ROUTINE PROCESS
Bedside and Verbal shift change report given to Marilin Cooper RN   (oncoming nurse) by Robin Alex RN (offgoing nurse). Report included the following information SBAR, Kardex, Intake/Output, MAR and Recent Results. 0730 Bedside and Verbal shift change report given to Robin Alex RN (oncoming nurse) by Marilin Cooper RN   (offgoing nurse). Report included the following information SBAR, Kardex, Intake/Output, MAR and Recent Results.

## 2019-01-21 NOTE — PROGRESS NOTES
Reason for Admission:   Acute exacerbation of chronic obstructive pulmonary disease (COPD) (HCC)  Dyspnea  Acute exacerbation of chronic obstructive pulmonary disease (COPD) ([facility] [address]               RRAT Score:     35             Resources/supports as identified by patient/family:   Patient reports that she has lots of family support at home and she has a personal care aide that is actively assisting her in home. Top Challenges facing patient (as identified by patient/family and CM): Finances/Medication cost?     NO    Transportation      Family will transport home. Support system or lack thereof? Family support system and personal care aide. Living arrangements? Lives with her daughter and grandchildren. Self-care/ADLs/Cognition? Alert and oriented. Current Advanced Directive/Advance Care Plan:   no                          Plan for utilizing home health:   Patient has used home health in the past and is open to using [facility] home health if she needs home health again. Likelihood of readmission:   HIGH    Transition of Care Plan:    Patient plans to return home with help from her family and personal care aide. Patient's family will transport home at the time of discharge. Initial assessment completed with patient. Cognitive status of patient: Alert and oriented. Face sheet information confirmed:  yes. The patient designates her daughter Pat Snyder and her sister King Aureliano to participate in his/her discharge plan and to receive any needed information. This patient lives in a house with her daughter and grandchildren. Patient is able to navigate steps as needed; however, it takes her a while to do so. Prior to hospitalization, patient was considered to be independent with ADLs/IADLS : yes . Patient has a current ACP document on file: no     Patient's daughter Pat Shelby) will be available to transport patient home upon discharge.    The patient already has home O2 (concetrator and portable), CPAP machine, nebulizer, bedside commode, a walker and a shower chair, available in the home. Patient is not currently active with home health; however, she is active with personal care aides. Patient has not stayed in a skilled nursing facility or rehab. Currently, the discharge plan is to return home with help from her family and her personal care aide. Patient's family will transport home at the time of discharge. Patient's daughter is (Anne Eldridge, #[phone] and [facility]#[phone]). Patient's sister is Valencia Sellers, #[phone]). Patient's PCP is Dr. Von Roe. Patient is insured through [place] and she also has [employer]. This writer will continue to closely monitor for discharge planning to ensure a safe discharge home from [facility]. The patient states that she can obtain her medications from the pharmacy, and take her medications as directed. Care Management Interventions  PCP Verified by CM: Yes(Dr. Von Roe)  Palliative Care Criteria Met (RRAT>21 & CHF Dx)?: No  Mode of Transport at Discharge:  Other (see comment)(Daughter Juventino Davila) will transport home.)  Transition of Care Consult (CM Consult): Discharge Planning  Current Support Network: Relative's Home(Lives with her daughter and grandchildren)  Confirm Follow Up Transport: Family  Plan discussed with Pt/Family/Caregiver: Yes  Discharge Location  Discharge Placement: Home with family assistance        Roxi Estrada 77. MSW  Care Manager  [facility]#[phone]

## 2019-01-21 NOTE — DISCHARGE INSTRUCTIONS
Patient Education     DISCHARGE SUMMARY from Nurse    PATIENT INSTRUCTIONS:    After general anesthesia or intravenous sedation, for 24 hours or while taking prescription Narcotics:  · Limit your activities  · Do not drive and operate hazardous machinery  · Do not make important personal or business decisions  · Do  not drink alcoholic beverages  · If you have not urinated within 8 hours after discharge, please contact your surgeon on call. Report the following to your surgeon:  · Excessive pain, swelling, redness or odor of or around the surgical area  · Temperature over 100.5  · Nausea and vomiting lasting longer than 4 hours or if unable to take medications  · Any signs of decreased circulation or nerve impairment to extremity: change in color, persistent  numbness, tingling, coldness or increase pain  · Any questions    What to do at Home:  Recommended activity: Activity as tolerated    If you experience any of the following symptoms worsening shortness of breath, swelling and/or pain in either one or both legs, fever, chills, or nausea and unable to keep down meds, please follow up with primary care physician. *  Please give a list of your current medications to your Primary Care Provider. *  Please update this list whenever your medications are discontinued, doses are      changed, or new medications (including over-the-counter products) are added. *  Please carry medication information at all times in case of emergency situations. These are general instructions for a healthy lifestyle:    No smoking/ No tobacco products/ Avoid exposure to second hand smoke  Surgeon General's Warning:  Quitting smoking now greatly reduces serious risk to your health.     Obesity, smoking, and sedentary lifestyle greatly increases your risk for illness    A healthy diet, regular physical exercise & weight monitoring are important for maintaining a healthy lifestyle    You may be retaining fluid if you have a history of heart failure or if you experience any of the following symptoms:  Weight gain of 3 pounds or more overnight or 5 pounds in a week, increased swelling in our hands or feet or shortness of breath while lying flat in bed. Please call your doctor as soon as you notice any of these symptoms; do not wait until your next office visit. Recognize signs and symptoms of STROKE:    F-face looks uneven    A-arms unable to move or move unevenly    S-speech slurred or non-existent    T-time-call 911 as soon as signs and symptoms begin-DO NOT go       Back to bed or wait to see if you get better-TIME IS BRAIN. Warning Signs of HEART ATTACK     Call 911 if you have these symptoms:   Chest discomfort. Most heart attacks involve discomfort in the center of the chest that lasts more than a few minutes, or that goes away and comes back. It can feel like uncomfortable pressure, squeezing, fullness, or pain.  Discomfort in other areas of the upper body. Symptoms can include pain or discomfort in one or both arms, the back, neck, jaw, or stomach.  Shortness of breath with or without chest discomfort.  Other signs may include breaking out in a cold sweat, nausea, or lightheadedness. Don't wait more than five minutes to call 911 - MINUTES MATTER! Fast action can save your life. Calling 911 is almost always the fastest way to get lifesaving treatment. Emergency Medical Services staff can begin treatment when they arrive -- up to an hour sooner than if someone gets to the hospital by car. The discharge information has been reviewed with the patient. The patient verbalized understanding. Discharge medications reviewed with the patient and appropriate educational materials and side effects teaching were provided.   ___________________________________________________________________________________________________________________________________     Chronic Obstructive Pulmonary Disease (COPD): Care Instructions  Your Care Instructions    Chronic obstructive pulmonary disease (COPD) is a general term for a group of lung diseases, including emphysema and chronic bronchitis. People with COPD have decreased airflow in and out of the lungs, which makes it hard to breathe. The airways also can get clogged with thick mucus. Cigarette smoking is a major cause of COPD. Although there is no cure for COPD, you can slow its progress. Following your treatment plan and taking care of yourself can help you feel better and live longer. Follow-up care is a key part of your treatment and safety. Be sure to make and go to all appointments, and call your doctor if you are having problems. It's also a good idea to know your test results and keep a list of the medicines you take. How can you care for yourself at home?   Staying healthy    · Do not smoke. This is the most important step you can take to prevent more damage to your lungs. If you need help quitting, talk to your doctor about stop-smoking programs and medicines. These can increase your chances of quitting for good.     · Avoid colds and flu. Get a pneumococcal vaccine shot. If you have had one before, ask your doctor whether you need a second dose. Get the flu vaccine every fall. If you must be around people with colds or the flu, wash your hands often.     · Avoid secondhand smoke, air pollution, and high altitudes. Also avoid cold, dry air and hot, humid air. Stay at home with your windows closed when air pollution is bad.    Medicines and oxygen therapy    · Take your medicines exactly as prescribed. Call your doctor if you think you are having a problem with your medicine.     · You may be taking medicines such as:  ? Bronchodilators. These help open your airways and make breathing easier. Bronchodilators are either short-acting (work for 6 to 9 hours) or long-acting (work for 24 hours). You inhale most bronchodilators, so they start to act quickly.  Always carry your quick-relief inhaler with you in case you need it while you are away from home. ? Corticosteroids (prednisone, budesonide). These reduce airway inflammation. They come in pill or inhaled form. You must take these medicines every day for them to work well.     · A spacer may help you get more inhaled medicine to your lungs. Ask your doctor or pharmacist if a spacer is right for you. If it is, ask how to use it properly.     · Do not take any vitamins, over-the-counter medicine, or herbal products without talking to your doctor first.     · If your doctor prescribed antibiotics, take them as directed. Do not stop taking them just because you feel better. You need to take the full course of antibiotics.     · Oxygen therapy boosts the amount of oxygen in your blood and helps you breathe easier. Use the flow rate your doctor has recommended, and do not change it without talking to your doctor first.   Activity    · Get regular exercise. Walking is an easy way to get exercise. Start out slowly, and walk a little more each day.     · Pay attention to your breathing. You are exercising too hard if you cannot talk while you are exercising.     · Take short rest breaks when doing household chores and other activities.     · Learn breathing methods--such as breathing through pursed lips--to help you become less short of breath.     · If your doctor has not set you up with a pulmonary rehabilitation program, talk to him or her about whether rehab is right for you. Rehab includes exercise programs, education about your disease and how to manage it, help with diet and other changes, and emotional support. Diet    · Eat regular, healthy meals. Use bronchodilators about 1 hour before you eat to make it easier to eat. Eat several small meals instead of three large ones.  Drink beverages at the end of the meal. Avoid foods that are hard to chew.     · Eat foods that contain protein so that you do not lose muscle mass.     · Talk with your doctor if you gain too much weight or if you lose weight without trying.    Mental health    · Talk to your family, friends, or a therapist about your feelings. It is normal to feel frightened, angry, hopeless, helpless, and even guilty. Talking openly about bad feelings can help you cope. If these feelings last, talk to your doctor. When should you call for help? Call 911 anytime you think you may need emergency care. For example, call if:    · You have severe trouble breathing.    Call your doctor now or seek immediate medical care if:    · You have new or worse trouble breathing.     · You cough up blood.     · You have a fever.    Watch closely for changes in your health, and be sure to contact your doctor if:    · You cough more deeply or more often, especially if you notice more mucus or a change in the color of your mucus.     · You have new or worse swelling in your legs or belly.     · You are not getting better as expected. Where can you learn more? Go to http://etelvina-olivier.info/. Moncho Rios in the search box to learn more about \"Chronic Obstructive Pulmonary Disease (COPD): Care Instructions. \"  Current as of: September 5, 2018  Content Version: 11.9  © 9603-9970 [employer]. Care instructions adapted under license by [employer] (which disclaims liability or warranty for this information). If you have questions about a medical condition or this instruction, always ask your healthcare professional. [place] [address] any warranty or liability for your use of this information. Patient armband removed and shredded    MyChart Activation    Thank you for requesting access to [facility]. Please follow the instructions below to securely access and download your online medical record. [employer] allows you to send messages to your doctor, view your test results, renew your prescriptions, schedule appointments, and more.     How Do I Sign Up? 1. In your internet browser, go to [email or web address]  2. Click on the First Time User? Click Here link in the Sign In box. You will be redirect to the New Member Sign Up page. 3. Enter your MediVision Access Code exactly as it appears below. You will not need to use this code after youve completed the sign-up process. If you do not sign up before the expiration date, you must request a new code. MediVision Access Code: [email or web address]  Expires: 2019  9:44 AM (This is the date your MediVision access code will )    4. Enter the last four digits of your Social Security Number (xxxx) and Date of Birth (mm/dd/yyyy) as indicated and click Submit. You will be taken to the next sign-up page. 5. Create a MediVision ID. This will be your MediVision login ID and cannot be changed, so think of one that is secure and easy to remember. 6. Create a MediVision password. You can change your password at any time. 7. Enter your Password Reset Question and Answer. This can be used at a later time if you forget your password. 8. Enter your e-mail address. You will receive e-mail notification when new information is available in [address] 9. Click Sign Up. You can now view and download portions of your medical record. 10. Click the Download Summary menu link to download a portable copy of your medical information. Additional Information    If you have questions, please visit the Frequently Asked Questions section of the MediVision website at [email or web address] Remember, Med[employer]Vision is NOT to be used for urgent needs. For medical emergencies, dial 911.

## 2019-01-21 NOTE — CDMP QUERY
Pt admitted with COPD /Pt noted to use home 02. . If possible, please document in the progress notes and d/c summary if you are evaluating and / or treating any of the following:  Chronic respiratory failure 
o With hypoxia 
o With hypercapnia  Other, please specify  Clinically unable to determine The medical record reflects the following: 
 
   Risk Factors:   COPD   , asthma, numerous admits Clinical Indicators: per pulm consult \" She has needed her home O2 at all times whereas she would typically only use it with exertional activities. \" 
saturating 91% on RA and approximately 96% on 2L oxygen. Treatment: continue home o2   2L  NC Thank you,   Darby Corrigan RN  CCDS  x [phone]

## 2019-01-21 NOTE — PROGRESS NOTES
Problem: Mobility Impaired (Adult and Pediatric)  Goal: *Acute Goals and Plan of Care (Insert Text)  Physical Therapy Goals  Initiated 1/19/2019 and to be accomplished within 7 day(s)  1. Patient will move from supine to sit and sit to supine , scoot up and down and roll side to side in bed with modified independence. 2.  Patient will transfer from bed to chair and chair to bed with modified independence using the least restrictive device. 3.  Patient will perform sit to stand with modified independence. 4.  Patient will ambulate with supervision/set-up for >150 feet with 1 standing rest break. 5.  Patient will ascend/descend 4-10 stairs with 1 handrail(s) with supervision/set-up. Outcome: Progressing Towards Goal  physical Therapy TREATMENT    Patient: Dustin Padilla (78 y.o. female)  Date: 1/21/2019  Diagnosis: Acute exacerbation of chronic obstructive pulmonary disease (COPD) ([facility])  Dyspnea  Acute exacerbation of chronic obstructive pulmonary disease (COPD) ([facility]) [facility]      Precautions: Fall   Chart, physical therapy assessment, plan of care and goals were reviewed. OBJECTIVE/ASSESSMENT:  Pt cleared for therapy by nursing and received semi-reclined in bed, agreeable to physical therapy treatment. Pt demonstrated independent supine-sit transfers and independent sit-stand transfers. Pt ambulated x 150 feet with supervision, no AD and no rest break but at a slow speed. Pt able to verbalize pursed lip breathing and is aware of when she should rest.  Pt declined attempting stairs this morning. At end of session, pt was left sitting independently on EOB with tray table ready for lunch, call bell in place, O2 on.   Education:   [x]         Bed mobility  [x]         Transfers  [x]         Ambulation  []         Assistive device management  []         Stairs  []         Body mechanics  [x]         Position change  [x] Activity pacing/energy conservation  []         Other:  Progression toward goals:  [x]      Improving appropriately and progressing toward goals  []      Improving slowly and progressing toward goals  []      Not making progress toward goals and plan of care will be adjusted     PLAN:  Patient continues to benefit from skilled intervention to address the above impairments. Continue treatment per established plan of care. Discharge Recommendations:  None  Further Equipment Recommendations for Discharge:  N/A     SUBJECTIVE:   Patient stated I know what to do when I get short of breath.     OBJECTIVE DATA SUMMARY:   Critical Behavior:  Neurologic State: Alert  Orientation Level: Oriented X4  Cognition: Follows commands     Functional Mobility Training:  Bed Mobility:  Scooting: Independent  Transfers:  Sit to Stand: Independent  Stand to Sit: Independent  Balance:  Sitting: Intact  Standing: Intact  Ambulation/Gait Training:  Distance (ft): 150 Feet (ft)(No standing rest breaks required)  Ambulation - Level of Assistance: Supervision  Gait Abnormalities: Decreased step clearance  Base of Support: Widened  Speed/Pam: Slow  Step Length: Left shortened;Right shortened    Pain:  Pre session: 0  Post session: 0  Activity Tolerance:   Fair  Please refer to the flowsheet for vital signs taken during this treatment. After treatment:   [] Patient left in no apparent distress sitting up in chair  [x] Patient left in no apparent distress in bed  [x] Call bell left within reach  [x] Nursing notified  [] Caregiver present  [] Bed alarm activated      Areli Ashby, PT   Time Calculation: 10 mins    Mobility  Current  CI= 1-19%   Goal  CH= 0%. The severity rating is based on the Level of Assistance required for Functional Mobility and ADLs.

## 2019-01-21 NOTE — PROGRESS NOTES
[facility]  Progress Note  Medical Record Number: [id number]    Patient: Elena Seth  YOB: 1960   Age: 60 y.o. Sex: female   DOA: 1/18/2019     Subjective  Acute events overnight and patient complaints:  -Patient still feels slightly SOB  -Has a non-productive cough  -She is open to going home today, if the team feels it is ok. ROS  Resp: Coughing but non-productive. SOB as above     Objective:  Visit Vitals  /73 (BP 1 Location: Right arm, BP Patient Position: At rest)   Pulse 79   Temp 96.5 °F (35.8 °C)   Resp 17   Ht 5' 3\" (1.6 m)   Wt 113.6 kg (250 lb 6.4 oz)   SpO2 96%   Breastfeeding? No   BMI 44.36 kg/m²     Physical Exam  General: NAD. Sitting up in bed  HEENT: Mucous membranes appears moist. No cervical LA. CV: RRR. No murmurs or gallops  RESP:  Mild expiratory wheezes appreciated bilaterally in the posterior lung fields  ABD: NABS, soft  Ext: Radial pulses 1+ bilaterally    Lab/Data Reviewed:  Basic Chemistry  Recent Labs     01/21/19  0251 01/20/19  0404 01/19/19  0651   * 233* 341*    140 138   K 3.6 3.3* 4.2    103 103   CO2 28 27 25   BUN 19* 18 16   CREA 0.93 0.87 1.10   CA 9.1 9.1 9.2   MG 2.0 1.9 2.0   PHOS  --   --  3.1       Liver Enzymes  Recent Labs     01/18/19  1805   TP 7.4   ALB 3.7   GLOB 3.7   AGRAT 1.0   SGOT 25   AP 95       Complete Blood Count with Differential  Recent Labs     01/21/19  0251 01/20/19  0404 01/19/19  0651   WBC 10.3 9.9 11.7   RBC 4.08* 4.05* 4.45   HGB 12.3 12.0 13.4   HCT 36.4 36.1 39.4    273 316   GRANS 64 72 90*   LYMPH 30 22 9*   EOS 0 0 0     Microbiology  No results for input(s): SDES, CULT in the last 72 hours. No results for input(s): CULT in the last 72 hours.     Scheduled Medications Reviewed:  Current Facility-Administered Medications   Medication Dose Route Frequency    levoFLOXacin (LEVAQUIN) tablet 750 mg  750 mg Oral ACB    guaiFENesin ER (MUCINEX) tablet 1,200 mg 1,200 mg Oral Q12H    aspirin delayed-release tablet 81 mg  81 mg Oral DAILY    hydroCHLOROthiazide (HYDRODIURIL) tablet 12.5 mg  12.5 mg Oral DAILY    lisinopril (PRINIVIL, ZESTRIL) tablet 20 mg  20 mg Oral BID    montelukast (SINGULAIR) tablet 10 mg  10 mg Oral QHS    pantoprazole (PROTONIX) tablet 40 mg  40 mg Oral ACB    sodium chloride (NS) flush 5-40 mL  5-40 mL IntraVENous Q8H    heparin (porcine) injection 5,000 Units  5,000 Units SubCUTAneous Q12H    albuterol-ipratropium (DUO-NEB) 2.5 MG-0.5 MG/3 ML  3 mL Nebulization Q4H RT    predniSONE (DELTASONE) tablet 40 mg  40 mg Oral DAILY WITH BREAKFAST    insulin lispro (HUMALOG) injection   SubCUTAneous AC&HS    insulin glargine (LANTUS) injection 30 Units  30 Units SubCUTAneous QHS       Imaging   CXR 1/18: Mild streaky bilateral opacities. Likely atelectasis vs scar    Microbiology   Sputum Cultures 1/14: Few Serratia Marcescens     EKG/Telemetry  Per Tele: Sinus Rhythm HR 80s    Assessment and Plan  60year old female with history of COPD on Home O2, hypertension, insulin dependent DM, obesity, diastolic CHF and SHERRIE who is admitted for a COPD exacerbation. COPD Exacerbation: She is significantly imporved from admission. Pulm is following.  -Continue Levaquin 750mg qD. Likely needs 7-10 days of total treatment  -Continue Prednisone 40mg qD for a total of 5 doses (1/19-1/23) but patient historically has required longer tapers  -Continue Singulair 10mg qD  -Continue supplemental O2 with NC and CPAP overnight  -Goal SPO2 >92%  -Mucinex for cough q12 hours.  Can also use Promethazine    Diastolic CHF with Preserved Ef  -Continue ASA 81mg qD  -Continue Lisinopril 20mg qD    Hypokalemia  -Resolved this moprning    Hypertension  -Continue Lisinopril 20mg qD  -Continue HCTZ 12.5mg qD    Insulin Dependent DM: HbA1c 8.0  -Continue home Lantus 30 units qHS  -Sliding Scale Insulin    SHERRIE  -Continue CPAP overnight    GERD  -Holding home Omeprazole 40mg qD and ordering Pantoprazole 40mg qD instead  -Continue home Famotidine 20mg BID PRN  -Simethicone 80mg QID PRN for GI discomfort    Diet: Diabetic Diet  DVT Prophylaxis: SQH  Code Status: Full  Point of Contact: Joao Weeks (Daughter): [phone]    Mc Palma MD, PGY-1  [facility]  January 21, 2019 7:49 AM

## 2019-01-21 NOTE — DIABETES MGMT
Diabetes Patient/Family Education Record  Factors That  May Influence Patients Ability  to Learn or  Comply with Recommendations   []   Language barrier    []   Cultural needs   []   Motivation    []   Cognitive limitation    []   Physical   []   Education    []   Physiological factors   []   Hearing/vision/speaking impairment   []   Holiness beliefs    []   Financial factors   []  Other:   [x]  No factors identified at this time. Person Instructed:   [x]   Patient   []   Family   []  Other     Preference for Learning:   [x]   Verbal   []   Written   []  Demonstration     Level of Comprehension & Competence:    []  Good                                      [x] Fair                                     []  Poor                             []  Needs Reinforcement   [x]  Teachback completed    Education Component:   [x]  Medication management, including how to administer insulin (if appropriate) and potential medication interactions Pt reports taking Lantus insulin 30 units nightly and Novolog insulin per sliding scale. Reports regular follow up with physician. [x]  Nutritional management -obtain usual meal pattern   []  Exercise   []  Signs, symptoms, and treatment of hyperglycemia and hypoglycemia   [] Prevention, recognition and treatment of hyperglycemia and hypoglycemia   [x]  Importance of blood glucose monitoring and how to obtain a blood glucose meter Pt has glucometer. Reports glucose has been higher than normal recently due to steroids.     []  Instruction on use of the blood glucose meter   [x]  Discuss the importance of HbA1C monitoring 8.0%   []  Sick day guidelines   []  Proper use and disposal of lancets, needles, syringes or insulin pens (if appropriate)   []  Potential long-term complications (retinopathy, kidney disease, neuropathy, foot care)   [] Information about whom to contact in case of emergency or for more information    [x]  Goal:  Patient/family will demonstrate understanding of Diabetes Self Management Skills by: 1/28/19  Plan for post-discharge education or self-management support:    [x] Outpatient class schedule provided            [] Patient Declined    [] Scheduled for outpatient classes (date) _______  Verify:  Does patient understand how diabetes medications work?  yes  Does patient know what their most recent A1c is? yes  Does patient monitor glucose at home? yes  Does patient have difficulty obtaining diabetes medications? no     Kaykay Norman RD, CDE  pgr [phone]

## 2019-01-21 NOTE — PROGRESS NOTES
Discharge:    Patient will be discharged home today (1/21/2019). Patient has no home health orders. She already has a personal care aide that will restart once she is at home. Patient's family will transport home at the time of discharge. There are no other care manager concerns regarding this discharge. This writer will continue to closely monitor for discharge planning to ensure a safe discharge home from Mikey Bhandari.     Camila Chao [facility] Manager  [facility]#[phone]

## 2019-01-23 LAB
BACTERIA SPEC CULT: NORMAL
GRAM STN SPEC: NORMAL
SERVICE CMNT-IMP: NORMAL

## 2019-04-05 ENCOUNTER — HOSPITAL ENCOUNTER (INPATIENT)
Age: 59
LOS: 5 days | Discharge: HOME HEALTH | DRG: 191 | End: 2019-04-10
Attending: EMERGENCY MEDICINE | Admitting: FAMILY MEDICINE
Payer: MEDICARE

## 2019-04-05 ENCOUNTER — APPOINTMENT (OUTPATIENT)
Dept: GENERAL RADIOLOGY | Age: 59
DRG: 191 | End: 2019-04-05
Attending: EMERGENCY MEDICINE
Payer: MEDICARE

## 2019-04-05 DIAGNOSIS — J44.1 ACUTE EXACERBATION OF CHRONIC OBSTRUCTIVE PULMONARY DISEASE (COPD) (HCC): Primary | ICD-10-CM

## 2019-04-05 DIAGNOSIS — R06.02 SOB (SHORTNESS OF BREATH): ICD-10-CM

## 2019-04-05 LAB
ALBUMIN SERPL-MCNC: 3.9 G/DL (ref 3.4–5)
ALBUMIN/GLOB SERPL: 1.1 {RATIO} (ref 0.8–1.7)
ALP SERPL-CCNC: 102 U/L (ref 45–117)
ALT SERPL-CCNC: 45 U/L (ref 13–56)
ANION GAP SERPL CALC-SCNC: 10 MMOL/L (ref 3–18)
APPEARANCE UR: ABNORMAL
AST SERPL-CCNC: 23 U/L (ref 15–37)
BACTERIA URNS QL MICRO: ABNORMAL /HPF
BASOPHILS # BLD: 0 K/UL (ref 0–0.1)
BASOPHILS NFR BLD: 0 % (ref 0–2)
BILIRUB SERPL-MCNC: 0.5 MG/DL (ref 0.2–1)
BILIRUB UR QL: NEGATIVE
BNP SERPL-MCNC: 34 PG/ML (ref 0–900)
BUN SERPL-MCNC: 12 MG/DL (ref 7–18)
BUN/CREAT SERPL: 12 (ref 12–20)
CALCIUM SERPL-MCNC: 9.3 MG/DL (ref 8.5–10.1)
CHLORIDE SERPL-SCNC: 104 MMOL/L (ref 100–108)
CK MB CFR SERPL CALC: 1.7 % (ref 0–4)
CK MB SERPL-MCNC: 1.3 NG/ML (ref 5–25)
CK SERPL-CCNC: 78 U/L (ref 26–192)
CO2 SERPL-SCNC: 26 MMOL/L (ref 21–32)
COLOR UR: YELLOW
CREAT SERPL-MCNC: 1.04 MG/DL (ref 0.6–1.3)
DIFFERENTIAL METHOD BLD: NORMAL
EOSINOPHIL # BLD: 0.2 K/UL (ref 0–0.4)
EOSINOPHIL NFR BLD: 2 % (ref 0–5)
EPITH CASTS URNS QL MICRO: ABNORMAL /LPF (ref 0–5)
ERYTHROCYTE [DISTWIDTH] IN BLOOD BY AUTOMATED COUNT: 13.1 % (ref 11.6–14.5)
EST. AVERAGE GLUCOSE BLD GHB EST-MCNC: 183 MG/DL
GLOBULIN SER CALC-MCNC: 3.6 G/DL (ref 2–4)
GLUCOSE SERPL-MCNC: 264 MG/DL (ref 74–99)
GLUCOSE UR STRIP.AUTO-MCNC: >1000 MG/DL
HBA1C MFR BLD: 8 % (ref 4.2–5.6)
HCT VFR BLD AUTO: 41.1 % (ref 35–45)
HGB BLD-MCNC: 14.7 G/DL (ref 12–16)
HGB UR QL STRIP: NEGATIVE
INR PPP: 0.9 (ref 0.8–1.2)
KETONES UR QL STRIP.AUTO: NEGATIVE MG/DL
LACTATE BLD-SCNC: 2.72 MMOL/L (ref 0.4–2)
LEUKOCYTE ESTERASE UR QL STRIP.AUTO: NEGATIVE
LYMPHOCYTES # BLD: 2.9 K/UL (ref 0.9–3.6)
LYMPHOCYTES NFR BLD: 39 % (ref 21–52)
MAGNESIUM SERPL-MCNC: 2.8 MG/DL (ref 1.6–2.6)
MCH RBC QN AUTO: 30.8 PG (ref 24–34)
MCHC RBC AUTO-ENTMCNC: 35.8 G/DL (ref 31–37)
MCV RBC AUTO: 86.2 FL (ref 74–97)
MONOCYTES # BLD: 0.5 K/UL (ref 0.05–1.2)
MONOCYTES NFR BLD: 7 % (ref 3–10)
NEUTS SEG # BLD: 3.9 K/UL (ref 1.8–8)
NEUTS SEG NFR BLD: 52 % (ref 40–73)
NITRITE UR QL STRIP.AUTO: NEGATIVE
PH UR STRIP: 5 [PH] (ref 5–8)
PLATELET # BLD AUTO: 294 K/UL (ref 135–420)
PMV BLD AUTO: 9.2 FL (ref 9.2–11.8)
POTASSIUM SERPL-SCNC: 4.1 MMOL/L (ref 3.5–5.5)
PROT SERPL-MCNC: 7.5 G/DL (ref 6.4–8.2)
PROT UR STRIP-MCNC: 30 MG/DL
PROTHROMBIN TIME: 11.8 SEC (ref 11.5–15.2)
RBC # BLD AUTO: 4.77 M/UL (ref 4.2–5.3)
RBC #/AREA URNS HPF: ABNORMAL /HPF (ref 0–5)
SODIUM SERPL-SCNC: 140 MMOL/L (ref 136–145)
SP GR UR REFRACTOMETRY: 1.02 (ref 1–1.03)
TROPONIN I SERPL-MCNC: <0.02 NG/ML (ref 0–0.04)
UROBILINOGEN UR QL STRIP.AUTO: 0.2 EU/DL (ref 0.2–1)
WBC # BLD AUTO: 7.4 K/UL (ref 4.6–13.2)
WBC URNS QL MICRO: ABNORMAL /HPF (ref 0–4)

## 2019-04-05 PROCEDURE — 83735 ASSAY OF MAGNESIUM: CPT

## 2019-04-05 PROCEDURE — 65270000029 HC RM PRIVATE

## 2019-04-05 PROCEDURE — 83605 ASSAY OF LACTIC ACID: CPT

## 2019-04-05 PROCEDURE — 74011250637 HC RX REV CODE- 250/637: Performed by: STUDENT IN AN ORGANIZED HEALTH CARE EDUCATION/TRAINING PROGRAM

## 2019-04-05 PROCEDURE — 74011636637 HC RX REV CODE- 636/637: Performed by: STUDENT IN AN ORGANIZED HEALTH CARE EDUCATION/TRAINING PROGRAM

## 2019-04-05 PROCEDURE — 87040 BLOOD CULTURE FOR BACTERIA: CPT

## 2019-04-05 PROCEDURE — 83036 HEMOGLOBIN GLYCOSYLATED A1C: CPT

## 2019-04-05 PROCEDURE — 74011250636 HC RX REV CODE- 250/636: Performed by: EMERGENCY MEDICINE

## 2019-04-05 PROCEDURE — 85025 COMPLETE CBC W/AUTO DIFF WBC: CPT

## 2019-04-05 PROCEDURE — 77030029684 HC NEB SM VOL KT MONA -A

## 2019-04-05 PROCEDURE — 82550 ASSAY OF CK (CPK): CPT

## 2019-04-05 PROCEDURE — 99285 EMERGENCY DEPT VISIT HI MDM: CPT

## 2019-04-05 PROCEDURE — 74011000250 HC RX REV CODE- 250: Performed by: EMERGENCY MEDICINE

## 2019-04-05 PROCEDURE — 83880 ASSAY OF NATRIURETIC PEPTIDE: CPT

## 2019-04-05 PROCEDURE — 96365 THER/PROPH/DIAG IV INF INIT: CPT

## 2019-04-05 PROCEDURE — 93005 ELECTROCARDIOGRAM TRACING: CPT

## 2019-04-05 PROCEDURE — 71045 X-RAY EXAM CHEST 1 VIEW: CPT

## 2019-04-05 PROCEDURE — 85610 PROTHROMBIN TIME: CPT

## 2019-04-05 PROCEDURE — 81001 URINALYSIS AUTO W/SCOPE: CPT

## 2019-04-05 PROCEDURE — 80053 COMPREHEN METABOLIC PANEL: CPT

## 2019-04-05 PROCEDURE — 74011250636 HC RX REV CODE- 250/636: Performed by: STUDENT IN AN ORGANIZED HEALTH CARE EDUCATION/TRAINING PROGRAM

## 2019-04-05 PROCEDURE — 94640 AIRWAY INHALATION TREATMENT: CPT

## 2019-04-05 RX ORDER — LEVOFLOXACIN 5 MG/ML
750 INJECTION, SOLUTION INTRAVENOUS EVERY 24 HOURS
Status: DISCONTINUED | OUTPATIENT
Start: 2019-04-05 | End: 2019-04-07

## 2019-04-05 RX ORDER — FAMOTIDINE 20 MG/1
20 TABLET, FILM COATED ORAL
Status: DISCONTINUED | OUTPATIENT
Start: 2019-04-05 | End: 2019-04-10 | Stop reason: HOSPADM

## 2019-04-05 RX ORDER — ACETAMINOPHEN 325 MG/1
650 TABLET ORAL
Status: DISCONTINUED | OUTPATIENT
Start: 2019-04-05 | End: 2019-04-10 | Stop reason: HOSPADM

## 2019-04-05 RX ORDER — MONTELUKAST SODIUM 10 MG/1
10 TABLET ORAL
Status: DISCONTINUED | OUTPATIENT
Start: 2019-04-05 | End: 2019-04-10 | Stop reason: HOSPADM

## 2019-04-05 RX ORDER — MAGNESIUM SULFATE 100 %
4 CRYSTALS MISCELLANEOUS AS NEEDED
Status: DISCONTINUED | OUTPATIENT
Start: 2019-04-05 | End: 2019-04-10 | Stop reason: HOSPADM

## 2019-04-05 RX ORDER — HEPARIN SODIUM 5000 [USP'U]/ML
5000 INJECTION, SOLUTION INTRAVENOUS; SUBCUTANEOUS EVERY 8 HOURS
Status: DISCONTINUED | OUTPATIENT
Start: 2019-04-05 | End: 2019-04-10 | Stop reason: HOSPADM

## 2019-04-05 RX ORDER — INSULIN LISPRO 100 [IU]/ML
INJECTION, SOLUTION INTRAVENOUS; SUBCUTANEOUS
Status: DISCONTINUED | OUTPATIENT
Start: 2019-04-06 | End: 2019-04-10 | Stop reason: HOSPADM

## 2019-04-05 RX ORDER — ASPIRIN 81 MG/1
81 TABLET ORAL DAILY
Status: DISCONTINUED | OUTPATIENT
Start: 2019-04-06 | End: 2019-04-10 | Stop reason: HOSPADM

## 2019-04-05 RX ORDER — FLUTICASONE PROPIONATE 50 MCG
2 SPRAY, SUSPENSION (ML) NASAL DAILY
COMMUNITY
End: 2020-07-18

## 2019-04-05 RX ORDER — PANTOPRAZOLE SODIUM 40 MG/1
40 TABLET, DELAYED RELEASE ORAL
Status: DISCONTINUED | OUTPATIENT
Start: 2019-04-06 | End: 2019-04-10 | Stop reason: HOSPADM

## 2019-04-05 RX ORDER — ALBUTEROL SULFATE 0.83 MG/ML
2.5 SOLUTION RESPIRATORY (INHALATION)
Status: COMPLETED | OUTPATIENT
Start: 2019-04-05 | End: 2019-04-05

## 2019-04-05 RX ORDER — INSULIN GLARGINE 100 [IU]/ML
30 INJECTION, SOLUTION SUBCUTANEOUS
Status: DISCONTINUED | OUTPATIENT
Start: 2019-04-05 | End: 2019-04-10 | Stop reason: HOSPADM

## 2019-04-05 RX ORDER — LISINOPRIL 40 MG/1
40 TABLET ORAL DAILY
Status: DISCONTINUED | OUTPATIENT
Start: 2019-04-06 | End: 2019-04-07

## 2019-04-05 RX ORDER — FLUTICASONE PROPIONATE 50 MCG
2 SPRAY, SUSPENSION (ML) NASAL DAILY
Status: DISCONTINUED | OUTPATIENT
Start: 2019-04-06 | End: 2019-04-10 | Stop reason: HOSPADM

## 2019-04-05 RX ORDER — SIMETHICONE 80 MG
80 TABLET,CHEWABLE ORAL
Status: DISCONTINUED | OUTPATIENT
Start: 2019-04-05 | End: 2019-04-10 | Stop reason: HOSPADM

## 2019-04-05 RX ORDER — DEXTROSE 50 % IN WATER (D50W) INTRAVENOUS SYRINGE
25-50 AS NEEDED
Status: DISCONTINUED | OUTPATIENT
Start: 2019-04-05 | End: 2019-04-10 | Stop reason: HOSPADM

## 2019-04-05 RX ORDER — HYDROCHLOROTHIAZIDE 25 MG/1
12.5 TABLET ORAL DAILY
Status: DISCONTINUED | OUTPATIENT
Start: 2019-04-06 | End: 2019-04-10 | Stop reason: HOSPADM

## 2019-04-05 RX ADMIN — INSULIN GLARGINE 30 UNITS: 100 INJECTION, SOLUTION SUBCUTANEOUS at 22:28

## 2019-04-05 RX ADMIN — HEPARIN SODIUM 5000 UNITS: 5000 INJECTION INTRAVENOUS; SUBCUTANEOUS at 22:30

## 2019-04-05 RX ADMIN — ALBUTEROL SULFATE 2.5 MG: 2.5 SOLUTION RESPIRATORY (INHALATION) at 20:24

## 2019-04-05 RX ADMIN — LEVOFLOXACIN 750 MG: 5 INJECTION, SOLUTION INTRAVENOUS at 20:25

## 2019-04-05 RX ADMIN — MONTELUKAST SODIUM 10 MG: 10 TABLET, FILM COATED ORAL at 22:24

## 2019-04-05 NOTE — ED NOTES
Bedside report handed off to [employer]. All essential information handed off.  Pt stable at this time

## 2019-04-05 NOTE — ED PROVIDER NOTES
EMERGENCY DEPARTMENT HISTORY AND PHYSICAL EXAM  This was created with voice recognition software and transcription errors may be present. 7:23 PM  Date: 4/5/2019  Patient Name: Namita Pickett    History of Presenting Illness     Chief Complaint:    History Provided By:     HPI: Namita Pickett is a 60 y.o. female with a past medical history of asthma, COPD, diabetes, GERD, hyperlipidemia, hypertension, SHERRIE who presents with shortness of breath times 5 days. Seen by pcp 2 days ago and placed on steriods and levoflox. She denies any fever or chills. Does note cough. No nausea no vomiting no chest pain.     PCP: Heather Burns MD      Past History     Past Medical History:  Past Medical History:   Diagnosis Date    Asthma     Chronic lung disease     COPD     Cystocele, midline     Diabetes mellitus ([address]     GERD (gastroesophageal reflux disease)     Hidradenitis suppurativa     Hyperlipidemia     Hypertension     SHERRIE on CPAP     CPAP    Stress incontinence        Past Surgical History:  Past Surgical History:   Procedure Laterality Date    BREAST SURGERY PROCEDURE UNLISTED      Right breast benign tumor removal    HX APPENDECTOMY      HX CHOLECYSTECTOMY      HX DILATION AND CURETTAGE      Dysfunctional uterine bleeding, thought 2/2 fibroids    HX TUBAL LIGATION         Family History:  Family History   Problem Relation Age of Onset    Hypertension Mother     Stroke Mother     Breast Cancer Mother         Bilateral mastectomies    Cancer Mother         ovarian and breast    Heart Failure Mother     Heart Attack Father         2011    Heart Surgery Father         CABG    Heart Failure Father     COPD Sister         Heavy smoker    Cancer Sister         ovarian    Heart Failure Sister     Lung Disease Sister     Asthma Child     Cancer Maternal Aunt         pancreatic    Cancer Maternal Grandfather         stomach       Social History:  Social History Tobacco Use    Smoking status: Former Smoker     Packs/day: 1.00     Years: 30.00     Pack years: 30.00     Types: Cigarettes     Start date: 1966     Last attempt to quit: 2006     Years since quittin.5    Smokeless tobacco: Never Used    Tobacco comment: 1-1.5 packs per day   Substance Use Topics    Alcohol use: No    Drug use: No       Allergies: Allergies   Allergen Reactions    Ancef [Cefazolin] Hives    Contrast Agent [Iodine] Anaphylaxis, Shortness of Breath and Swelling     Needs pre-medication for IV contrast with Benadryl, Solu-Medrol    Fish Containing Products Anaphylaxis     Pt states she had a severe allergic reaction at 7 y/o.  Metformin Other (comments)     Abdominal pain, diarrhea.  Codeine Other (comments)     Altered mental status       Review of Systems     Review of Systems   Constitutional: Negative for chills and fatigue. Respiratory: Positive for shortness of breath. All other systems reviewed and are negative. Physical Exam       Physical Exam   Constitutional: She is oriented to person, place, and time. She appears well-developed. HENT:   Head: Normocephalic and atraumatic. Eyes: Pupils are equal, round, and reactive to light. EOM are normal.   Neck: Normal range of motion. Neck supple. Cardiovascular: Normal rate, regular rhythm and normal heart sounds. Exam reveals no friction rub. No murmur heard. Pulmonary/Chest: Effort normal and breath sounds normal. No respiratory distress. She has no wheezes. Diminished breath sounds bilaterally faint wheeze at most nothing overt   Abdominal: Soft. She exhibits no distension. There is no tenderness. There is no rebound and no guarding. Musculoskeletal: Normal range of motion. Neurological: She is alert and oriented to person, place, and time. Skin: Skin is warm and dry. Psychiatric: She has a normal mood and affect.  Her behavior is normal. Thought content normal.       Diagnostic Study Results     Vital Signs   Visit Vitals  /82   Pulse (!) 107   Temp 97.9 °F (36.6 °C)   Resp 20   SpO2 97%          EKG: EKG shows sinus tachycardia with a rate of 107 with a normal axis normal intervals there is no ST elevation or depression and no hypertrophy    Labs: reviewed    Imaging: cxr ? pulm edema; was htn on arrival IVF held due to poss ;chf.  ? Echo   Medical Decision Making     ED Course: Progress Notes, Reevaluation, and Consults:      Provider Notes (Medical Decision Making): This is a 48-year-old female who presents with shortness of breath consistent with prior COPD. She was seen by her PCP yesterday. She was given albuterol Atrovent and Solu-Medrol in route. She denies any fevers will observe and see if she does need admission. At         Diagnosis     Clinical Impression: No diagnosis found. Disposition:    Patient's Medications   Start Taking    No medications on file   Continue Taking    ALBUTEROL (PROVENTIL VENTOLIN) 2.5 MG /3 ML (0.083 %) NEBULIZER SOLUTION    3 mL by Nebulization route every four (4) hours as needed for Wheezing. Indications: BRONCHOSPASM PREVENTION, Chronic Obstructive Pulmonary Disease    ALBUTEROL SULFATE 90 MCG/ACTUATION AEPB    Take 1 Puff by inhalation every four (4) hours. ALBUTEROL-IPRATROPIUM (DUO-NEB) 2.5 MG-0.5 MG/3 ML NEBU    3 mL by Nebulization route every six (6) hours as needed. ASPIRIN DELAYED-RELEASE 81 MG TABLET    Take 81 mg by mouth daily. CPAP MACHINE KIT    by Does Not Apply route nightly. FAMOTIDINE (PEPCID) 20 MG TABLET    Take 20 mg by mouth two (2) times daily as needed for Other (reflux). FLUTICASONE-SALMETEROL (ADVAIR HFA) 115-21 MCG/ACTUATION INHALER    Take 2 Puffs by inhalation two (2) times a day. HYDROCHLOROTHIAZIDE (HYDRODIURIL) 12.5 MG TABLET    Take 12.5 mg by mouth daily. INSULIN GLARGINE (LANTUS,BASAGLAR) 100 UNIT/ML (3 ML) INPN    35 Units by SubCUTAneous route nightly.     LISINOPRIL ([place]) 40 MG TABLET    Take 20 mg by mouth two (2) times a day. Indications: hypertension    MONTELUKAST (SINGULAIR) 10 MG TABLET    Take 10 mg by mouth nightly. NOVOLOG FLEXPEN U-100 INSULIN 100 UNIT/ML INPN    Continue home Sliding scale insulin as prior to admission    OMEPRAZOLE (PRILOSEC) 40 MG CAPSULE    Take 40 mg by mouth daily. Indications: gastroesophageal reflux disease    OXYGEN-AIR DELIVERY SYSTEMS    2 L by Nasal route continuous. [facility]    PREDNISONE (DELTASONE) 10 MG TABLET    Take 2 tabs daily for one week. Then take 1 tab daily for 4 days. Then take one tablet every other day until finished. SIMETHICONE (MYLICON) 80 MG CHEWABLE TABLET    Take 80 mg by mouth every six (6) hours as needed for Flatulence. TIOTROPIUM BROMIDE (SPIRIVA RESPIMAT) 2.5 MCG/ACTUATION INHALER    Take 2 Puffs by inhalation daily.    These Medications have changed    No medications on file   Stop Taking    No medications on file

## 2019-04-05 NOTE — ED TRIAGE NOTES
Pt arrived to ED via EMS with CO respiratory distress x1 week but worse today. Given solumedrol, mag, and duo neb in route with improvement.  Pt remains labored

## 2019-04-06 LAB
ANION GAP SERPL CALC-SCNC: 10 MMOL/L (ref 3–18)
ATRIAL RATE: 107 BPM
BASOPHILS # BLD: 0 K/UL (ref 0–0.1)
BASOPHILS NFR BLD: 0 % (ref 0–2)
BUN SERPL-MCNC: 16 MG/DL (ref 7–18)
BUN/CREAT SERPL: 13 (ref 12–20)
CALCIUM SERPL-MCNC: 9.8 MG/DL (ref 8.5–10.1)
CALCULATED P AXIS, ECG09: 48 DEGREES
CALCULATED R AXIS, ECG10: 36 DEGREES
CALCULATED T AXIS, ECG11: 43 DEGREES
CHLORIDE SERPL-SCNC: 99 MMOL/L (ref 100–108)
CO2 SERPL-SCNC: 24 MMOL/L (ref 21–32)
CREAT SERPL-MCNC: 1.19 MG/DL (ref 0.6–1.3)
DIAGNOSIS, 93000: NORMAL
DIFFERENTIAL METHOD BLD: ABNORMAL
EOSINOPHIL # BLD: 0 K/UL (ref 0–0.4)
EOSINOPHIL NFR BLD: 0 % (ref 0–5)
ERYTHROCYTE [DISTWIDTH] IN BLOOD BY AUTOMATED COUNT: 13.2 % (ref 11.6–14.5)
GLUCOSE BLD STRIP.AUTO-MCNC: 282 MG/DL (ref 70–110)
GLUCOSE BLD STRIP.AUTO-MCNC: 305 MG/DL (ref 70–110)
GLUCOSE BLD STRIP.AUTO-MCNC: 316 MG/DL (ref 70–110)
GLUCOSE BLD STRIP.AUTO-MCNC: 355 MG/DL (ref 70–110)
GLUCOSE SERPL-MCNC: 337 MG/DL (ref 74–99)
HCT VFR BLD AUTO: 40.4 % (ref 35–45)
HGB BLD-MCNC: 14.1 G/DL (ref 12–16)
LACTATE SERPL-SCNC: 3.1 MMOL/L (ref 0.4–2)
LACTATE SERPL-SCNC: 3.8 MMOL/L (ref 0.4–2)
LYMPHOCYTES # BLD: 0.7 K/UL (ref 0.9–3.6)
LYMPHOCYTES NFR BLD: 9 % (ref 21–52)
MCH RBC QN AUTO: 30.1 PG (ref 24–34)
MCHC RBC AUTO-ENTMCNC: 34.9 G/DL (ref 31–37)
MCV RBC AUTO: 86.1 FL (ref 74–97)
MONOCYTES # BLD: 0.1 K/UL (ref 0.05–1.2)
MONOCYTES NFR BLD: 1 % (ref 3–10)
NEUTS SEG # BLD: 6.6 K/UL (ref 1.8–8)
NEUTS SEG NFR BLD: 90 % (ref 40–73)
P-R INTERVAL, ECG05: 142 MS
PLATELET # BLD AUTO: 286 K/UL (ref 135–420)
PMV BLD AUTO: 9.2 FL (ref 9.2–11.8)
POTASSIUM SERPL-SCNC: 4.2 MMOL/L (ref 3.5–5.5)
Q-T INTERVAL, ECG07: 346 MS
QRS DURATION, ECG06: 86 MS
QTC CALCULATION (BEZET), ECG08: 461 MS
RBC # BLD AUTO: 4.69 M/UL (ref 4.2–5.3)
SODIUM SERPL-SCNC: 133 MMOL/L (ref 136–145)
VENTRICULAR RATE, ECG03: 107 BPM
WBC # BLD AUTO: 7.4 K/UL (ref 4.6–13.2)

## 2019-04-06 PROCEDURE — 5A09357 ASSISTANCE WITH RESPIRATORY VENTILATION, LESS THAN 24 CONSECUTIVE HOURS, CONTINUOUS POSITIVE AIRWAY PRESSURE: ICD-10-PCS | Performed by: FAMILY MEDICINE

## 2019-04-06 PROCEDURE — 74011250637 HC RX REV CODE- 250/637: Performed by: STUDENT IN AN ORGANIZED HEALTH CARE EDUCATION/TRAINING PROGRAM

## 2019-04-06 PROCEDURE — 74011250636 HC RX REV CODE- 250/636: Performed by: STUDENT IN AN ORGANIZED HEALTH CARE EDUCATION/TRAINING PROGRAM

## 2019-04-06 PROCEDURE — 94660 CPAP INITIATION&MGMT: CPT

## 2019-04-06 PROCEDURE — 83605 ASSAY OF LACTIC ACID: CPT

## 2019-04-06 PROCEDURE — 82962 GLUCOSE BLOOD TEST: CPT

## 2019-04-06 PROCEDURE — 94761 N-INVAS EAR/PLS OXIMETRY MLT: CPT

## 2019-04-06 PROCEDURE — 77010033678 HC OXYGEN DAILY

## 2019-04-06 PROCEDURE — 65270000029 HC RM PRIVATE

## 2019-04-06 PROCEDURE — 94640 AIRWAY INHALATION TREATMENT: CPT

## 2019-04-06 PROCEDURE — 74011000250 HC RX REV CODE- 250: Performed by: STUDENT IN AN ORGANIZED HEALTH CARE EDUCATION/TRAINING PROGRAM

## 2019-04-06 PROCEDURE — 80048 BASIC METABOLIC PNL TOTAL CA: CPT

## 2019-04-06 PROCEDURE — 36415 COLL VENOUS BLD VENIPUNCTURE: CPT

## 2019-04-06 PROCEDURE — 74011000258 HC RX REV CODE- 258: Performed by: STUDENT IN AN ORGANIZED HEALTH CARE EDUCATION/TRAINING PROGRAM

## 2019-04-06 PROCEDURE — 85025 COMPLETE CBC W/AUTO DIFF WBC: CPT

## 2019-04-06 PROCEDURE — 74011636637 HC RX REV CODE- 636/637: Performed by: STUDENT IN AN ORGANIZED HEALTH CARE EDUCATION/TRAINING PROGRAM

## 2019-04-06 RX ORDER — SODIUM CHLORIDE 9 MG/ML
500 INJECTION, SOLUTION INTRAVENOUS ONCE
Status: COMPLETED | OUTPATIENT
Start: 2019-04-06 | End: 2019-04-06

## 2019-04-06 RX ORDER — IPRATROPIUM BROMIDE AND ALBUTEROL SULFATE 2.5; .5 MG/3ML; MG/3ML
3 SOLUTION RESPIRATORY (INHALATION)
Status: DISCONTINUED | OUTPATIENT
Start: 2019-04-06 | End: 2019-04-08 | Stop reason: SDUPTHER

## 2019-04-06 RX ORDER — IPRATROPIUM BROMIDE AND ALBUTEROL SULFATE 2.5; .5 MG/3ML; MG/3ML
3 SOLUTION RESPIRATORY (INHALATION)
Status: DISCONTINUED | OUTPATIENT
Start: 2019-04-06 | End: 2019-04-06

## 2019-04-06 RX ORDER — SODIUM CHLORIDE 450 MG/100ML
150 INJECTION, SOLUTION INTRAVENOUS CONTINUOUS
Status: DISCONTINUED | OUTPATIENT
Start: 2019-04-06 | End: 2019-04-09

## 2019-04-06 RX ADMIN — INSULIN LISPRO 6 UNITS: 100 INJECTION, SOLUTION INTRAVENOUS; SUBCUTANEOUS at 22:57

## 2019-04-06 RX ADMIN — ASPIRIN 81 MG: 81 TABLET, COATED ORAL at 09:32

## 2019-04-06 RX ADMIN — MONTELUKAST SODIUM 10 MG: 10 TABLET, FILM COATED ORAL at 22:55

## 2019-04-06 RX ADMIN — INSULIN LISPRO 10 UNITS: 100 INJECTION, SOLUTION INTRAVENOUS; SUBCUTANEOUS at 12:30

## 2019-04-06 RX ADMIN — SODIUM CHLORIDE 500 ML: 900 INJECTION, SOLUTION INTRAVENOUS at 02:31

## 2019-04-06 RX ADMIN — SODIUM CHLORIDE 100 ML/HR: 450 INJECTION, SOLUTION INTRAVENOUS at 22:53

## 2019-04-06 RX ADMIN — METHYLPREDNISOLONE SODIUM SUCCINATE 60 MG: 40 INJECTION, POWDER, FOR SOLUTION INTRAMUSCULAR; INTRAVENOUS at 05:53

## 2019-04-06 RX ADMIN — FLUTICASONE PROPIONATE 2 SPRAY: 50 SPRAY, METERED NASAL at 09:00

## 2019-04-06 RX ADMIN — LEVOFLOXACIN 750 MG: 5 INJECTION, SOLUTION INTRAVENOUS at 22:54

## 2019-04-06 RX ADMIN — IPRATROPIUM BROMIDE AND ALBUTEROL SULFATE 3 ML: .5; 3 SOLUTION RESPIRATORY (INHALATION) at 16:31

## 2019-04-06 RX ADMIN — HEPARIN SODIUM 5000 UNITS: 5000 INJECTION INTRAVENOUS; SUBCUTANEOUS at 05:53

## 2019-04-06 RX ADMIN — HYDROCHLOROTHIAZIDE 12.5 MG: 25 TABLET ORAL at 09:32

## 2019-04-06 RX ADMIN — LISINOPRIL 40 MG: 40 TABLET ORAL at 09:00

## 2019-04-06 RX ADMIN — HEPARIN SODIUM 5000 UNITS: 5000 INJECTION INTRAVENOUS; SUBCUTANEOUS at 23:01

## 2019-04-06 RX ADMIN — SODIUM CHLORIDE 100 ML/HR: 450 INJECTION, SOLUTION INTRAVENOUS at 12:00

## 2019-04-06 RX ADMIN — INSULIN LISPRO 8 UNITS: 100 INJECTION, SOLUTION INTRAVENOUS; SUBCUTANEOUS at 08:30

## 2019-04-06 RX ADMIN — INSULIN GLARGINE 30 UNITS: 100 INJECTION, SOLUTION SUBCUTANEOUS at 22:56

## 2019-04-06 RX ADMIN — HEPARIN SODIUM 5000 UNITS: 5000 INJECTION INTRAVENOUS; SUBCUTANEOUS at 14:30

## 2019-04-06 RX ADMIN — PANTOPRAZOLE SODIUM 40 MG: 40 TABLET, DELAYED RELEASE ORAL at 09:32

## 2019-04-06 RX ADMIN — INSULIN LISPRO 8 UNITS: 100 INJECTION, SOLUTION INTRAVENOUS; SUBCUTANEOUS at 16:00

## 2019-04-06 NOTE — H&P
Admission History and Physical  [facility]      Patient: Maggie Zelaya MRN: [id number]  CSN: [id number]    YOB: 1960  Age: 60 y.o. Sex: female      DOA: 4/5/2019       HPI:     Maggie Zelaya is a 60 y.o. female with PMH COPD, diastolic CHF, DMT2, HTN, GERD, allergic rhinitis and SHERRIE, now presenting with complaint of SOB. Patient complains of SOB, CHAUDHARY, chest tightness, wheeze and dry cough that started one week ago. Patient reports that she has significant CHAUDHARY and had difficulty finding the energy to get out of her car. Patient seen at [facility] on 4/3 for COPD exacerbation and was started on levaquin for 10 days and prednisone taper. Patient has taken one dose of each. However, her SOB did not improve and so she came to the ED. Denies sick contacts, fever, chills, congestion, n/v or CP. Patient endorses trace edema. Patient reports that Juan Mews tend to be difficult months for her. She is a former smoker.     ED Course: CBC, BMP, EKG, atrovent and solumedrol en route    Review of Systems - General ROS: negative for  - chills, fever, night sweats  Psychological ROS: negative for - anxiety and depression  Ophthalmic ROS: negative for - blurry vision, decreased vision or loss of vision  ENT ROS: negative for - headaches, hearing change or visual changes  Hematological and Lymphatic ROS: negative for - bruising, jaundice  Respiratory ROS:  Cough, shortness of breath, wheezing, negative for - hemoptysis,  orthopnea, paroxysmal dyspnea  Cardiovascular ROS: chest tightness, dyspnea on exertion, edema, negative for - chest pain,  loss of consciousness, or palpitations   Gastrointestinal ROS: negative for - abdominal pain, blood in stools, change in stools, constipation, diarrhea, hematemesis, melena, nausea/vomiting or swallowing difficulty/pain  Genito-Urinary ROS: negative for - dysuria, hematuria or urinary frequency/urgency  Musculoskeletal ROS: negative for - joint pain, joint swelling or muscle pain  Neurological ROS: negative for - dizziness, headaches, numbness/tingling or weakness  Dermatological ROS: negative for - rash or skin lesion changes      Past Medical History:   Diagnosis Date    Asthma     Chronic lung disease     COPD     Cystocele, midline     Diabetes mellitus ([facility] [address]     GERD (gastroesophageal reflux disease)     Hidradenitis suppurativa     Hyperlipidemia     Hypertension     SHERRIE on CPAP     CPAP    Stress incontinence        Past Surgical History:   Procedure Laterality Date    BREAST SURGERY PROCEDURE UNLISTED      Right breast benign tumor removal    HX APPENDECTOMY      HX CHOLECYSTECTOMY      HX DILATION AND CURETTAGE      Dysfunctional uterine bleeding, thought 2/2 fibroids    HX TUBAL LIGATION         Family History   Problem Relation Age of Onset    Hypertension Mother     Stroke Mother     Breast Cancer Mother         Bilateral mastectomies    Cancer Mother         ovarian and breast    Heart Failure Mother     Heart Attack Father         2011    Heart Surgery Father         CABG    Heart Failure Father     COPD Sister         Heavy smoker    Cancer Sister         ovarian    Heart Failure Sister     Lung Disease Sister     Asthma Child     Cancer Maternal Aunt         pancreatic    Cancer Maternal Grandfather         stomach       Social History     Socioeconomic History    Marital status: LEGALLY      Spouse name: Not on file    Number of children: Not on file    Years of education: Not on file    Highest education level: Not on file   Tobacco Use    Smoking status: Former Smoker     Packs/day: 1.00     Years: 30.00     Pack years: 30.00     Types: Cigarettes     Start date: 1966     Last attempt to quit: 2006     Years since quittin.5    Smokeless tobacco: Never Used    Tobacco comment: 1-1.5 packs per day   Substance and Sexual Activity    Alcohol use: No    Drug use: No    Sexual activity: Never       Allergies   Allergen Reactions    Ancef [Cefazolin] Hives    Contrast Agent [Iodine] Anaphylaxis, Shortness of Breath and Swelling     Needs pre-medication for IV contrast with Benadryl, Solu-Medrol    Fish Containing Products Anaphylaxis     Pt states she had a severe allergic reaction at 7 y/o.  Metformin Other (comments)     Abdominal pain, diarrhea.  Codeine Other (comments)     Altered mental status       Prior to Admission Medications   Prescriptions Last Dose Informant Patient Reported? Taking? NOVOLOG FLEXPEN U-100 INSULIN 100 unit/mL inpn   No No   Sig: Continue home Sliding scale insulin as prior to admission   Patient taking differently: If BG <100=0u  101-150=5u  151-250=8u  251-300=12u  >300 call MD   OXYGEN-AIR DELIVERY SYSTEMS   Yes No   Si L by Nasal route continuous. [facility] Choice   albuterol (PROVENTIL VENTOLIN) 2.5 mg /3 mL (0.083 %) nebulizer solution   No No   Sig: 3 mL by Nebulization route every four (4) hours as needed for Wheezing. Indications: BRONCHOSPASM PREVENTION, Chronic Obstructive Pulmonary Disease   albuterol sulfate 90 mcg/actuation aepb   No No   Sig: Take 1 Puff by inhalation every four (4) hours. albuterol-ipratropium (DUO-NEB) 2.5 mg-0.5 mg/3 ml nebu   No No   Sig: 3 mL by Nebulization route every six (6) hours as needed. aspirin delayed-release 81 mg tablet   Yes No   Sig: Take 81 mg by mouth daily. cpap machine kit   Yes No   Sig: by Does Not Apply route nightly. famotidine (PEPCID) 20 mg tablet   Yes No   Sig: Take 20 mg by mouth two (2) times daily as needed for Other (reflux). fluticasone-salmeterol (ADVAIR HFA) 115-21 mcg/actuation inhaler   Yes No   Sig: Take 2 Puffs by inhalation two (2) times a day. hydroCHLOROthiazide (HYDRODIURIL) 12.5 mg tablet   Yes No   Sig: Take 12.5 mg by mouth daily. insulin glargine (LANTUS,BASAGLAR) 100 unit/mL (3 mL) inpn   No No   Si Units by SubCUTAneous route nightly.    Patient taking differently: 30 Units by SubCUTAneous route nightly. lisinopril (PRINIVIL, ZESTRIL) 40 mg tablet   Yes No   Sig: Take 20 mg by mouth two (2) times a day. Indications: hypertension   montelukast (SINGULAIR) 10 mg tablet   Yes No   Sig: Take 10 mg by mouth nightly. omeprazole (PRILOSEC) 40 mg capsule   Yes No   Sig: Take 40 mg by mouth daily. Indications: gastroesophageal reflux disease   predniSONE (DELTASONE) 10 mg tablet   No No   Sig: Take 2 tabs daily for one week. Then take 1 tab daily for 4 days. Then take one tablet every other day until finished. simethicone (MYLICON) 80 mg chewable tablet   Yes No   Sig: Take 80 mg by mouth every six (6) hours as needed for Flatulence. tiotropium bromide (SPIRIVA RESPIMAT) 2.5 mcg/actuation inhaler   Yes No   Sig: Take 2 Puffs by inhalation daily. Facility-Administered Medications: None       Physical Exam:     Patient Vitals for the past 24 hrs:   Temp Pulse Resp BP SpO2   04/05/19 2024 -- 97 -- 136/68 --   04/05/19 1945 -- 100 27 168/72 96 %   04/05/19 1930 -- (!) 103 -- -- 96 %   04/05/19 1929 -- (!) 104 -- -- 97 %   04/05/19 1928 -- 100 14 -- 96 %   04/05/19 1927 -- -- -- -- 96 %   04/05/19 1927 -- (!) 101 21 -- 96 %   04/05/19 1926 -- (!) 103 27 -- 96 %   04/05/19 1925 -- (!) 106 23 -- 97 %   04/05/19 1924 -- (!) 106 21 -- 96 %   04/05/19 1923 -- (!) 106 21 -- 97 %   04/05/19 1922 -- (!) 108 19 -- 96 %   04/05/19 1921 97.9 °F (36.6 °C) (!) 114 20 200/82 97 %       Physical Exam:  General:  Alert and Responsive and in No acute distress. HEENT: Conjunctiva pink, sclera anicteric. EOMI. Pharynx moist, nonerythematous. Moist mucous membranes. Thyroid not enlarged, no nodules. No cervical, supraclavicular, occipital or submandibular lymphadenopathy. No other gross abnormalities present. CV:  RRR, no murmurs. No visible pulsations or thrills. RESP:  Speaks 2-3 word sentences. Bilateral end-expiratory wheezing. Equal expansion bilaterally. ABD:  Soft, nontender, nondistended. MS:  No joint deformity or instability. No atrophy. Neuro:  5/5 strength bilateral upper extremities and lower extremities. A+Ox3. Ext:  Trace edema. 2+ radial and dp pulses bilaterally. Skin:  No rashes, lesions, or ulcers. Good turgor. IMAGING:   No results found. Recent Results (from the past 12 hour(s))   CBC WITH AUTOMATED DIFF    Collection Time: 04/05/19  7:12 PM   Result Value Ref Range    WBC 7.4 4.6 - 13.2 K/uL    RBC 4.77 4.20 - 5.30 M/uL    HGB 14.7 12.0 - 16.0 g/dL    HCT 41.1 35.0 - 45.0 %    MCV 86.2 74.0 - 97.0 FL    MCH 30.8 24.0 - 34.0 PG    MCHC 35.8 31.0 - 37.0 g/dL    RDW 13.1 11.6 - 14.5 %    PLATELET 970 510 - 597 K/uL    MPV 9.2 9.2 - 11.8 FL    NEUTROPHILS 52 40 - 73 %    LYMPHOCYTES 39 21 - 52 %    MONOCYTES 7 3 - 10 %    EOSINOPHILS 2 0 - 5 %    BASOPHILS 0 0 - 2 %    ABS. NEUTROPHILS 3.9 1.8 - 8.0 K/UL    ABS. LYMPHOCYTES 2.9 0.9 - 3.6 K/UL    ABS. MONOCYTES 0.5 0.05 - 1.2 K/UL    ABS. EOSINOPHILS 0.2 0.0 - 0.4 K/UL    ABS. BASOPHILS 0.0 0.0 - 0.1 K/UL    DF AUTOMATED     METABOLIC PANEL, COMPREHENSIVE    Collection Time: 04/05/19  7:12 PM   Result Value Ref Range    Sodium 140 136 - 145 mmol/L    Potassium 4.1 3.5 - 5.5 mmol/L    Chloride 104 100 - 108 mmol/L    CO2 26 21 - 32 mmol/L    Anion gap 10 3.0 - 18 mmol/L    Glucose 264 (H) 74 - 99 mg/dL    BUN 12 7.0 - 18 MG/DL    Creatinine 1.04 0.6 - 1.3 MG/DL    BUN/Creatinine ratio 12 12 - 20      GFR est AA >60 >60 ml/min/1.73m2    GFR est non-AA 54 (L) >60 ml/min/1.73m2    Calcium 9.3 8.5 - 10.1 MG/DL    Bilirubin, total PENDING MG/DL    ALT (SGPT) 45 13 - 56 U/L    AST (SGOT) 23 15 - 37 U/L    Alk.  phosphatase PENDING U/L    Protein, total PENDING g/dL    Albumin 3.9 3.4 - 5.0 g/dL    Globulin PENDING g/dL    A-G Ratio PENDING     PROTHROMBIN TIME + INR    Collection Time: 04/05/19  7:12 PM   Result Value Ref Range    Prothrombin time 11.8 11.5 - 15.2 sec    INR 0.9 0.8 - 1.2 MAGNESIUM    Collection Time: 04/05/19  7:12 PM   Result Value Ref Range    Magnesium 2.8 (H) 1.6 - 2.6 mg/dL   EKG, 12 LEAD, INITIAL    Collection Time: 04/05/19  7:23 PM   Result Value Ref Range    Ventricular Rate 107 BPM    Atrial Rate 107 BPM    P-R Interval 142 ms    QRS Duration 86 ms    Q-T Interval 346 ms    QTC Calculation (Bezet) 461 ms    Calculated P Axis 48 degrees    Calculated R Axis 36 degrees    Calculated T Axis 43 degrees    Diagnosis       Sinus tachycardia  Septal infarct , age undetermined  Abnormal ECG  When compared with ECG of 18-JAN-2019 17:56,  No significant change was found     URINALYSIS W/ RFLX MICROSCOPIC    Collection Time: 04/05/19  8:30 PM   Result Value Ref Range    Color YELLOW      Appearance CLOUDY      Specific gravity 1.025 1.005 - 1.030      pH (UA) 5.0 5.0 - 8.0      Protein 30 (A) NEG mg/dL    Glucose >1,000 (A) NEG mg/dL    Ketone NEGATIVE  NEG mg/dL    Bilirubin NEGATIVE  NEG      Blood NEGATIVE  NEG      Urobilinogen 0.2 0.2 - 1.0 EU/dL    Nitrites NEGATIVE  NEG      Leukocyte Esterase NEGATIVE  NEG     POC LACTIC ACID    Collection Time: 04/05/19  8:41 PM   Result Value Ref Range    Lactic Acid (POC) 2.72 (HH) 0.40 - 2.00 mmol/L         Assessment/Plan:   60 y.o. female with PMH COPD, diastolic CHF, DMT2, HTN, GERD, allergic rhinitis and SHERRIE, now admitted with COPD exacerbation. COPD exacerbation with lactic acidosis, SIRS 2/4  On presentation, patient was tachycardic and tachypneic with shortness of breath on 6L oxygen NC. Patient had elevated lactic 2.72 concerning for possible CAP. SIRS 2/4 (RR 28 and ). Patient recurrently admitted for COPD exacerbations.   - Admit to telemetry  - CBC, BMP, Mg daily   - Duonebs prn  - Levaquin 750 mg IV daily  - IV solumedrol 60 mg  - Continue supplemental oxygen  - CPAP overnight, daughter to bring tomorrow  - Continue singular 10 mg every day  - Hold advair 2 puffs BID daily  - Hold spiriva 1.25 mcg/act 2 puffs daily  - Tylenol prn  - PT/OT/CM     Diastolic CHF with preserved EF, HTN. Stable on exam. ECHO 7/2018 EF 66-70% with no wma and LVH. - continue aspirin 81 mg po daily  - Vital signs per unit routine  - Continue lisinopril 40 mg po daily  - Continue hctz 12.5 mg po daily     DMT2 BG on admission 264. Last HgbA1c 8 from 1/18/2019. - Accuchecks achs  - SSI  - Continue home Lantus 30U qhs  - Consistent carbohydrate diet  - Hemoglobin A1c     H/O SHERRIE  - CPAP qhs     GERD, Allergic rhinitis  - Hold home omeprazole 40 mg po daily   - Continue protonix 40 mg po daily  - Continue home famotidine 20 mg po bid prn   - Continue home simethicone prn   - Continue flonase    Diet: Diabetic  DVT Prophylaxis: [facility]  Code Status: Full  Point of Contact: Caryn Relationship: Daughter [phone]    Disposition and anticipated LOS: [id number] [id number] Kelli  PGY-1  04/05/19 8:49 PM  Pager: [phone]          Senior Resident History and Physical  [facility]     HPI:      Viv Villarreal is a 60 y.o. female with a PMH of COPD on home O2, IDDM2, HTN, HFpEF, SHERRIE on CPAP, GERD, and allergic rhinitis now admitted with complaint of worsening shortness of breath.     Patient reports that for the past 5 days this week she has been having worsening shortness of breath. Patient reports that every little movement made he feel winded and caused chest tightness. Patient was seen by her PCP Dr. Yessica Cohen at [facility] on 4/3/19 and was noted to be wheezing diffusely on physical exam. Patient was started on PO steroid 12-day taper and PO levaquin for acute COPD exacerbation. Patient was instructed to do albuterol nebulizer every 3-4 hours over the next few days. Patient reports that she took her first dose of PO steroids and PO levaquin this AM. Patient reports since her PCP visit her shortness of breath had worsened.  Patient reports that she was so winded, she was unable to get out of her own vehicle today after arriving to her granddaughter's house. Patient denies any fevers/chills, congestion, sore throat, ear pain, chest pain, abdominal pain, n/v/c/d, or urinary symptoms. Patient reports she has been having clear rhinorrhea all week which is indicative of her allergic rhinitis. Patient reports that she has been having a dry cough all week that worsened over the past 2 days. Patient denies any sick contacts of smoke exposure.     HPI, ROS, PMH, PSH, Family Hx, Social Hx, home medications, and allergies as above in intern H&P.     ED Course:  Labs: Cardiac Panel, CBC, CMP, Mag, Lactic Acid, PT/INR, UA w/micro, Blood cultures x2  Imaging/EKG/Radiology: CXR, EKG  Medications: Albuterol Neb x1  Drips/Infusions: IV Levaquin 750 mg x1     Physical Exam:      Visit Vitals  /68   Pulse 99   Temp 98.6 °F (37 °C)   Resp 24   SpO2 95%         General:  WD, WN, NAD  HEENT:  NCAT. Conjunctiva pink, sclera anicteric. PERRL. EOMI. Pharynx moist, nonerythematous. Moist mucous membranes. Thyroid not enlarged, no nodules. No cervical, supraclavicular, or submandibular lymphadenopathy. CV:  RRR, no mumurs. PMI not displaced. RESP:  Unlabored breathing. CTAB, difuse end-expiratory wheezin, no rales or rhonchi. ABD:  Soft, nontender, nondistended. Normoactive bowel sounds. No hepatosplenomegaly. No suprapubic tenderness. No CVA tenderness. MS:  No joint deformity or instability. No atrophy. Neuro:  5/5 strength bilateral upper extremities and lower extremities. CN II-XII intact. Sensation grossly intact. A+Ox3. Ext:  No edema. 2+ radial and dp pulses bilaterally. Skin:  No rashes, lesions, or ulcers. Good turgor.   RECTAL:  See intern physical exam     Recent Results          Recent Results (from the past 12 hour(s))   CBC WITH AUTOMATED DIFF     Collection Time: 04/05/19  7:12 PM   Result Value Ref Range     WBC 7.4 4.6 - 13.2 K/uL     RBC 4.77 4.20 - 5.30 M/uL     HGB 14.7 12.0 - 16.0 g/dL     HCT 41.1 35.0 - 45.0 %     MCV 86.2 74.0 - 97.0 FL     MCH 30.8 24.0 - 34.0 PG     MCHC 35.8 31.0 - 37.0 g/dL     RDW 13.1 11.6 - 14.5 %     PLATELET 410 573 - 741 K/uL     MPV 9.2 9.2 - 11.8 FL     NEUTROPHILS 52 40 - 73 %     LYMPHOCYTES 39 21 - 52 %     MONOCYTES 7 3 - 10 %     EOSINOPHILS 2 0 - 5 %     BASOPHILS 0 0 - 2 %     ABS. NEUTROPHILS 3.9 1.8 - 8.0 K/UL     ABS. LYMPHOCYTES 2.9 0.9 - 3.6 K/UL     ABS. MONOCYTES 0.5 0.05 - 1.2 K/UL     ABS. EOSINOPHILS 0.2 0.0 - 0.4 K/UL     ABS. BASOPHILS 0.0 0.0 - 0.1 K/UL     DF AUTOMATED     METABOLIC PANEL, COMPREHENSIVE     Collection Time: 04/05/19  7:12 PM   Result Value Ref Range     Sodium 140 136 - 145 mmol/L     Potassium 4.1 3.5 - 5.5 mmol/L     Chloride 104 100 - 108 mmol/L     CO2 26 21 - 32 mmol/L     Anion gap 10 3.0 - 18 mmol/L     Glucose 264 (H) 74 - 99 mg/dL     BUN 12 7.0 - 18 MG/DL     Creatinine 1.04 0.6 - 1.3 MG/DL     BUN/Creatinine ratio 12 12 - 20       GFR est AA >60 >60 ml/min/1.73m2     GFR est non-AA 54 (L) >60 ml/min/1.73m2     Calcium 9.3 8.5 - 10.1 MG/DL     Bilirubin, total 0.5 0.2 - 1.0 MG/DL     ALT (SGPT) 45 13 - 56 U/L     AST (SGOT) 23 15 - 37 U/L     Alk.  phosphatase 102 45 - 117 U/L     Protein, total 7.5 6.4 - 8.2 g/dL     Albumin 3.9 3.4 - 5.0 g/dL     Globulin 3.6 2.0 - 4.0 g/dL     A-G Ratio 1.1 0.8 - 1.7     PROTHROMBIN TIME + INR     Collection Time: 04/05/19  7:12 PM   Result Value Ref Range     Prothrombin time 11.8 11.5 - 15.2 sec     INR 0.9 0.8 - 1.2     CARDIAC PANEL,(CK, CKMB & TROPONIN)     Collection Time: 04/05/19  7:12 PM   Result Value Ref Range     CK 78 26 - 192 U/L     CK - MB 1.3 <3.6 ng/ml     CK-MB Index 1.7 0.0 - 4.0 %     Troponin-I, QT <0.02 0.0 - 0.045 NG/ML   MAGNESIUM     Collection Time: 04/05/19  7:12 PM   Result Value Ref Range     Magnesium 2.8 (H) 1.6 - 2.6 mg/dL   NT-PRO BNP     Collection Time: 04/05/19  7:12 PM   Result Value Ref Range     NT pro-BNP 34 0 - 900 PG/ML EKG, 12 LEAD, INITIAL     Collection Time: 04/05/19  7:23 PM   Result Value Ref Range     Ventricular Rate 107 BPM     Atrial Rate 107 BPM     P-R Interval 142 ms     QRS Duration 86 ms     Q-T Interval 346 ms     QTC Calculation (Bezet) 461 ms     Calculated P Axis 48 degrees     Calculated R Axis 36 degrees     Calculated T Axis 43 degrees     Diagnosis           Sinus tachycardia  Septal infarct , age undetermined  Abnormal ECG  When compared with ECG of 18-JAN-2019 17:56,  No significant change was found      URINALYSIS W/ RFLX MICROSCOPIC     Collection Time: 04/05/19  8:30 PM   Result Value Ref Range     Color YELLOW       Appearance CLOUDY       Specific gravity 1.025 1.005 - 1.030       pH (UA) 5.0 5.0 - 8.0       Protein 30 (A) NEG mg/dL     Glucose >1,000 (A) NEG mg/dL     Ketone NEGATIVE  NEG mg/dL     Bilirubin NEGATIVE  NEG       Blood NEGATIVE  NEG       Urobilinogen 0.2 0.2 - 1.0 EU/dL     Nitrites NEGATIVE  NEG       Leukocyte Esterase NEGATIVE  NEG     URINE MICROSCOPIC ONLY     Collection Time: 04/05/19  8:30 PM   Result Value Ref Range     WBC 0 to 3 0 - 4 /hpf     RBC 0 to 3 0 - 5 /hpf     Epithelial cells 1+ 0 - 5 /lpf     Bacteria FEW (A) NEG /hpf   POC LACTIC ACID     Collection Time: 04/05/19  8:41 PM   Result Value Ref Range     Lactic Acid (POC) 2.72 (HH) 0.40 - 2.00 mmol/L         No results found.     Assessment/Plan:   60 y.o. female with a PMH of COPD on home O2, IDDM2, HTN, HFpEF, SHERRIE on CPAP, GERD, and allergic rhinitis , now admitted with acute COPD exacerbation and lactic acidosis.     1.) COPD w/Acute Exacerbation: H/o moderate COPD, GOLD 2. Presents to [facility] after 5 days of worsening shortness of breath, dyspnea on exertion, and chest tightness. Noted to have diffuse end-expiratory wheezing on physical exam. Started on outpatient treatment for COPD exacerbation on 4/3/19 to include PO prednisone + PO levaquin; first dose of both medications on 4/5/19.  Given albuterol, Atrovent, and solumedrol en route to [facility].  - Admit to telemetry   - Duo-nebs q4h per RT  - IV solumedrol 60 mg daily  - Continue IV Levaquin 750 mg q24h  - Continue supplemental O2 to maintain O2 sats between 88-92%, wean appropriately  - Continue home Singluar 10 mg QHS  - Hold home Advair 1 puff BID  - Hold home Spiriva 1.25 mcg/act 2 puffs daily  - F/u CXR  - Daily CBC, BMP, Mag  - Monitor VS per unit routine  - Pulse oximetry per unit routine  - F/u PT/OT/CM recs     2.) SIRS, Lactic Acidosis: DDx: COPD w/Acute COPD Exacerbation vs PNA vs UTI vs viral URI. Met 2/4 SIRS criteria (tachycardia, tachypnea). Afebrile. No leukocytosis, no left shift, no bands. Lactic acid 2.72. Urinalysis showed 30 protein, >1,000 glucose, and few bacteria. - Continue IV Levaquin 750 mg q24h  - F/u CXR  - F/u blood cultures  - Trend lactic acid q4h until normalizes  - Daily CBC  - Monitor fever curve  - Monitor VS per unit routine     3.) IDDM2: Last Hgb A1c: 8.0 (1/18/19). - Continue home Lantus 30 units QHS  - SSI + Accuchecks ACHS  - F/u Hgb A1c     4.) HTN, HFpEF: Admission BP ranging 130s-200s/60-80s. Last ECHO (7/2018): EF 66-70%; left ventricular mild concentric hypertrophy.   - Continue home ASA 81 mg daily  - Continue home Lisinopril 40 mg daily  - Continue home HCTZ 12.5 mg daily  - Monitor VS per unit routine  - Monitor I/O's per unit routine  - Daily BMP, Mag     5.) GERD  - Hold home Omeprazole 40 mg daily, not on hospital formulary  - Start PO Protonix 40 mg daily  - Continue home Famotidine 20 mg BID PRN  - Continue home Simethicone 80 mg QID PRN  - Reflux precautions     6.) Allergic Rhinitis  - Continue home Flonase 2 sprays daily        *Please see intern note above for additional chronic disease mgmt.        Ritu Brown MD, PGY-2  [facility] Medicine

## 2019-04-06 NOTE — ROUTINE PROCESS
TRANSFER - IN REPORT:    Verbal report received from Darell Childers RN(name) on Samantha Spangler  being received from ER(unit) for routine progression of care      Report consisted of patients Situation, Background, Assessment and   Recommendations(SBAR). Information from the following report(s) SBAR, Kardex, ED Summary, Intake/Output, MAR and Recent Results was reviewed with the receiving nurse. Opportunity for questions and clarification was provided. Assessment completed upon patients arrival to unit and care assumed.

## 2019-04-06 NOTE — PROGRESS NOTES
Problem: Diabetes Self-Management  Goal: *Disease process and treatment process  Description  Define diabetes and identify own type of diabetes; list 3 options for treating diabetes. Outcome: Progressing Towards Goal  Goal: *Incorporating nutritional management into lifestyle  Description  Describe effect of type, amount and timing of food on blood glucose; list 3 methods for planning meals. Outcome: Progressing Towards Goal  Goal: *Incorporating physical activity into lifestyle  Description  State effect of exercise on blood glucose levels. Outcome: Progressing Towards Goal  Goal: *Developing strategies to promote health/change behavior  Description  Define the ABC's of diabetes; identify appropriate screenings, schedule and personal plan for screenings. Outcome: Progressing Towards Goal  Goal: *Using medications safely  Description  State effect of diabetes medications on diabetes; name diabetes medication taking, action and side effects. Outcome: Progressing Towards Goal  Goal: *Monitoring blood glucose, interpreting and using results  Description  Identify recommended blood glucose targets  and personal targets. Outcome: Progressing Towards Goal  Goal: *Prevention, detection, treatment of acute complications  Description  List symptoms of hyper- and hypoglycemia; describe how to treat low blood sugar and actions for lowering  high blood glucose level. Outcome: Progressing Towards Goal  Goal: *Prevention, detection and treatment of chronic complications  Description  Define the natural course of diabetes and describe the relationship of blood glucose levels to long term complications of diabetes.   Outcome: Progressing Towards Goal  Goal: *Developing strategies to address psychosocial issues  Description  Describe feelings about living with diabetes; identify support needed and support network  Outcome: Progressing Towards Goal  Goal: *Sick day guidelines  Outcome: Progressing Towards Goal  Goal: *Patient Specific Goal (EDIT GOAL, INSERT TEXT)  Outcome: Progressing Towards Goal     Problem: Patient Education: Go to Patient Education Activity  Goal: Patient/Family Education  Outcome: Progressing Towards Goal     Problem: Falls - Risk of  Goal: *Absence of Falls  Description  Document David Emily Fall Risk and appropriate interventions in the flowsheet.   Outcome: Progressing Towards Goal     Problem: Patient Education: Go to Patient Education Activity  Goal: Patient/Family Education  Outcome: Progressing Towards Goal     Problem: Chronic Obstructive Pulmonary Disease (COPD)  Goal: *Oxygen saturation during activity within specified parameters  Outcome: Progressing Towards Goal  Goal: *Able to remain out of bed as prescribed  Outcome: Progressing Towards Goal  Goal: *Absence of hypoxia  Outcome: Progressing Towards Goal  Goal: *Optimize nutritional status  Outcome: Progressing Towards Goal     Problem: Patient Education: Go to Patient Education Activity  Goal: Patient/Family Education  Outcome: Progressing Towards Goal

## 2019-04-06 NOTE — PROGRESS NOTES
Intern Progress Note  [facility]       Patient: Tayo Queen MRN: [id number]  CSN: [id number]    YOB: 1960  Age: 60 y.o. Sex: female    DOA: 4/5/2019 LOS:  LOS: 1 day                    Subjective:     Acute events: Patient on CPAP overnight. Patient reports improved SOB, chest tightness and wheezing, although they are still present,    Patient given 500 cc NS bolus last night due to elevated lactic acid 3.8. Review of Systems   Constitutional: Negative for chills and fever. Respiratory: Positive for cough and shortness of breath. Negative for sputum production. Cardiovascular: Negative for chest pain and palpitations. Gastrointestinal: Negative for nausea and vomiting. Objective:      Patient Vitals for the past 24 hrs:   Temp Pulse Resp BP SpO2   04/06/19 0407 97.9 °F (36.6 °C) 84 23 116/72 94 %   04/05/19 2321 97.4 °F (36.3 °C) 95 22 153/78 94 %   04/05/19 2215 -- 95 23 143/79 96 %   04/05/19 2200 -- -- -- 130/56 --   04/05/19 2130 98.6 °F (37 °C) 99 24 142/68 95 %   04/05/19 2115 -- 97 17 -- 96 %   04/05/19 2100 -- 100 28 148/65 96 %   04/05/19 2045 -- 98 24 133/63 95 %   04/05/19 2024 -- 97 -- 136/68 --   04/05/19 1945 -- 100 27 168/72 96 %   04/05/19 1930 -- (!) 103 -- -- 96 %   04/05/19 1929 -- (!) 104 -- -- 97 %   04/05/19 1928 -- 100 14 -- 96 %   04/05/19 1927 -- -- -- -- 96 %   04/05/19 1927 -- (!) 101 21 -- 96 %   04/05/19 1926 -- (!) 103 27 -- 96 %   04/05/19 1925 -- (!) 106 23 -- 97 %   04/05/19 1924 -- (!) 106 21 -- 96 %   04/05/19 1923 -- (!) 106 21 -- 97 %   04/05/19 1922 -- (!) 108 19 -- 96 %   04/05/19 1921 97.9 °F (36.6 °C) (!) 114 20 200/82 97 %         Intake/Output Summary (Last 24 hours) at 4/6/2019 0517  Last data filed at 4/6/2019 0056  Gross per 24 hour   Intake 390 ml   Output --   Net 390 ml     Physical Exam:  General:  Alert and Responsive and in No acute distress. HEENT: Conjunctiva pink, sclera anicteric. EOMI.   Pharynx moist, nonerythematous. Moist mucous membranes. CV:  RRR, no murmurs. No visible pulsations or thrills. RESP:  Speaks 4-5 word sentences. Bilateral end-expiratory wheezing. Equal expansion bilaterally. ABD:  Soft, nontender, nondistended. MS:  No joint deformity or instability. No atrophy. Neuro:  5/5 strength bilateral upper extremities and lower extremities. A+Ox3. Ext:  Trace edema. 2+ radial and dp pulses bilaterally. Skin:  No rashes, lesions, or ulcers. Good turgor. Lab/Data Reviewed:  Recent Results (from the past 12 hour(s))   CBC WITH AUTOMATED DIFF    Collection Time: 04/05/19  7:12 PM   Result Value Ref Range    WBC 7.4 4.6 - 13.2 K/uL    RBC 4.77 4.20 - 5.30 M/uL    HGB 14.7 12.0 - 16.0 g/dL    HCT 41.1 35.0 - 45.0 %    MCV 86.2 74.0 - 97.0 FL    MCH 30.8 24.0 - 34.0 PG    MCHC 35.8 31.0 - 37.0 g/dL    RDW 13.1 11.6 - 14.5 %    PLATELET 929 033 - 397 K/uL    MPV 9.2 9.2 - 11.8 FL    NEUTROPHILS 52 40 - 73 %    LYMPHOCYTES 39 21 - 52 %    MONOCYTES 7 3 - 10 %    EOSINOPHILS 2 0 - 5 %    BASOPHILS 0 0 - 2 %    ABS. NEUTROPHILS 3.9 1.8 - 8.0 K/UL    ABS. LYMPHOCYTES 2.9 0.9 - 3.6 K/UL    ABS. MONOCYTES 0.5 0.05 - 1.2 K/UL    ABS. EOSINOPHILS 0.2 0.0 - 0.4 K/UL    ABS. BASOPHILS 0.0 0.0 - 0.1 K/UL    DF AUTOMATED     METABOLIC PANEL, COMPREHENSIVE    Collection Time: 04/05/19  7:12 PM   Result Value Ref Range    Sodium 140 136 - 145 mmol/L    Potassium 4.1 3.5 - 5.5 mmol/L    Chloride 104 100 - 108 mmol/L    CO2 26 21 - 32 mmol/L    Anion gap 10 3.0 - 18 mmol/L    Glucose 264 (H) 74 - 99 mg/dL    BUN 12 7.0 - 18 MG/DL    Creatinine 1.04 0.6 - 1.3 MG/DL    BUN/Creatinine ratio 12 12 - 20      GFR est AA >60 >60 ml/min/1.73m2    GFR est non-AA 54 (L) >60 ml/min/1.73m2    Calcium 9.3 8.5 - 10.1 MG/DL    Bilirubin, total 0.5 0.2 - 1.0 MG/DL    ALT (SGPT) 45 13 - 56 U/L    AST (SGOT) 23 15 - 37 U/L    Alk.  phosphatase 102 45 - 117 U/L    Protein, total 7.5 6.4 - 8.2 g/dL    Albumin 3.9 3.4 - 5.0 g/dL    Globulin 3.6 2.0 - 4.0 g/dL    A-G Ratio 1.1 0.8 - 1.7     PROTHROMBIN TIME + INR    Collection Time: 04/05/19  7:12 PM   Result Value Ref Range    Prothrombin time 11.8 11.5 - 15.2 sec    INR 0.9 0.8 - 1.2     CARDIAC PANEL,(CK, CKMB & TROPONIN)    Collection Time: 04/05/19  7:12 PM   Result Value Ref Range    CK 78 26 - 192 U/L    CK - MB 1.3 <3.6 ng/ml    CK-MB Index 1.7 0.0 - 4.0 %    Troponin-I, QT <0.02 0.0 - 0.045 NG/ML   MAGNESIUM    Collection Time: 04/05/19  7:12 PM   Result Value Ref Range    Magnesium 2.8 (H) 1.6 - 2.6 mg/dL   NT-PRO BNP    Collection Time: 04/05/19  7:12 PM   Result Value Ref Range    NT pro-BNP 34 0 - 900 PG/ML   HEMOGLOBIN A1C WITH EAG    Collection Time: 04/05/19  7:12 PM   Result Value Ref Range    Hemoglobin A1c 8.0 (H) 4.2 - 5.6 %    Est. average glucose 183 mg/dL   EKG, 12 LEAD, INITIAL    Collection Time: 04/05/19  7:23 PM   Result Value Ref Range    Ventricular Rate 107 BPM    Atrial Rate 107 BPM    P-R Interval 142 ms    QRS Duration 86 ms    Q-T Interval 346 ms    QTC Calculation (Bezet) 461 ms    Calculated P Axis 48 degrees    Calculated R Axis 36 degrees    Calculated T Axis 43 degrees    Diagnosis       Sinus tachycardia  Septal infarct , age undetermined  Abnormal ECG  When compared with ECG of 18-JAN-2019 17:56,  No significant change was found     URINALYSIS W/ RFLX MICROSCOPIC    Collection Time: 04/05/19  8:30 PM   Result Value Ref Range    Color YELLOW      Appearance CLOUDY      Specific gravity 1.025 1.005 - 1.030      pH (UA) 5.0 5.0 - 8.0      Protein 30 (A) NEG mg/dL    Glucose >1,000 (A) NEG mg/dL    Ketone NEGATIVE  NEG mg/dL    Bilirubin NEGATIVE  NEG      Blood NEGATIVE  NEG      Urobilinogen 0.2 0.2 - 1.0 EU/dL    Nitrites NEGATIVE  NEG      Leukocyte Esterase NEGATIVE  NEG     URINE MICROSCOPIC ONLY    Collection Time: 04/05/19  8:30 PM   Result Value Ref Range    WBC 0 to 3 0 - 4 /hpf    RBC 0 to 3 0 - 5 /hpf    Epithelial cells 1+ 0 - 5 /lpf    Bacteria FEW (A) NEG /hpf   POC LACTIC ACID    Collection Time: 04/05/19  8:41 PM   Result Value Ref Range    Lactic Acid (POC) 2.72 (HH) 0.40 - 2.00 mmol/L   LACTIC ACID    Collection Time: 04/06/19  1:03 AM   Result Value Ref Range    Lactic acid 3.8 (HH) 0.4 - 2.0 MMOL/L   METABOLIC PANEL, BASIC    Collection Time: 04/06/19  1:03 AM   Result Value Ref Range    Sodium 133 (L) 136 - 145 mmol/L    Potassium 4.2 3.5 - 5.5 mmol/L    Chloride 99 (L) 100 - 108 mmol/L    CO2 24 21 - 32 mmol/L    Anion gap 10 3.0 - 18 mmol/L    Glucose 337 (H) 74 - 99 mg/dL    BUN 16 7.0 - 18 MG/DL    Creatinine 1.19 0.6 - 1.3 MG/DL    BUN/Creatinine ratio 13 12 - 20      GFR est AA 56 (L) >60 ml/min/1.73m2    GFR est non-AA 46 (L) >60 ml/min/1.73m2    Calcium 9.8 8.5 - 10.1 MG/DL   CBC WITH AUTOMATED DIFF    Collection Time: 04/06/19  1:03 AM   Result Value Ref Range    WBC 7.4 4.6 - 13.2 K/uL    RBC 4.69 4.20 - 5.30 M/uL    HGB 14.1 12.0 - 16.0 g/dL    HCT 40.4 35.0 - 45.0 %    MCV 86.1 74.0 - 97.0 FL    MCH 30.1 24.0 - 34.0 PG    MCHC 34.9 31.0 - 37.0 g/dL    RDW 13.2 11.6 - 14.5 %    PLATELET 136 815 - 577 K/uL    MPV 9.2 9.2 - 11.8 FL    NEUTROPHILS 90 (H) 40 - 73 %    LYMPHOCYTES 9 (L) 21 - 52 %    MONOCYTES 1 (L) 3 - 10 %    EOSINOPHILS 0 0 - 5 %    BASOPHILS 0 0 - 2 %    ABS. NEUTROPHILS 6.6 1.8 - 8.0 K/UL    ABS. LYMPHOCYTES 0.7 (L) 0.9 - 3.6 K/UL    ABS. MONOCYTES 0.1 0.05 - 1.2 K/UL    ABS. EOSINOPHILS 0.0 0.0 - 0.4 K/UL    ABS.  BASOPHILS 0.0 0.0 - 0.1 K/UL    DF AUTOMATED           Scheduled Medications Reviewed:  Current Facility-Administered Medications   Medication Dose Route Frequency    levoFLOXacin (LEVAQUIN) 750 mg in D5W IVPB  750 mg IntraVENous Q24H    heparin (porcine) injection 5,000 Units  5,000 Units SubCUTAneous Q8H    aspirin delayed-release tablet 81 mg  81 mg Oral DAILY    fluticasone propionate (FLONASE) 50 mcg/actuation nasal spray 2 Spray  2 Spray Both Nostrils DAILY    hydroCHLOROthiazide (HYDRODIURIL) tablet 12.5 mg  12.5 mg Oral DAILY    lisinopril (PRINIVIL, ZESTRIL) tablet 40 mg  40 mg Oral DAILY    montelukast (SINGULAIR) tablet 10 mg  10 mg Oral QHS    pantoprazole (PROTONIX) tablet 40 mg  40 mg Oral ACB    insulin lispro (HUMALOG) injection   SubCUTAneous AC&HS    insulin glargine (LANTUS) injection 30 Units  30 Units SubCUTAneous QHS    methylPREDNISolone (PF) (SOLU-MEDROL) injection 60 mg  60 mg IntraVENous DAILY         Imaging, microbiology, and EKG/Telemetry:No results found. Assessment/Plan   60 y.o. female with PMH COPD, diastolic CHF, DMT2, HTN, GERD, allergic rhinitis and SHERRIE, now admitted with COPD exacerbation.     COPD exacerbation with lactic acidosis, SIRS 2/4  On presentation, patient was tachycardic and tachypneic with shortness of breath on 6L oxygen NC. Patient had elevated lactic 2.72 concerning for possible CAP. SIRS 2/4 (RR 28 and ). Patient recurrently admitted for COPD exacerbations. - CBC, BMP, Mg daily   - Duonebs prn  - Levaquin 750 mg IV daily  - IV solumedrol 60 mg daily  - Continue supplemental oxygen  - CPAP overnight, daughter to bring today  - Continue singular 10 mg every day  - Hold advair 2 puffs BID daily  - Hold spiriva 1.25 mcg/act 2 puffs daily  - Tylenol prn  - PT/OT/CM      Diastolic CHF with preserved EF, HTN. Stable on exam. ECHO 7/2018 EF 66-70% with no wma and LVH. - continue aspirin 81 mg po daily  - Vital signs per unit routine  - Continue lisinopril 40 mg po daily  - Continue hctz 12.5 mg po daily     DMT2 BG on admission 264. Last HgbA1c 8 from 1/18/2019.   - Accuchecks achs  - SSI  - Continue home Lantus 30U qhs  - Consistent carbohydrate diet  - Hemoglobin A1c 8 (4/5)     H/O SHERRIE  - CPAP qhs     GERD, Allergic rhinitis  - Hold home omeprazole 40 mg po daily   - Continue protonix 40 mg po daily  - Continue home famotidine 20 mg po bid prn   - Continue home simethicone prn   - Continue flonase     Diet: Diabetic  DVT Prophylaxis: SQH  Code Status: Full  Point of Contact: Sergey David [phone]     Disposition and anticipated LOS: [address] [address], MD Kelli Chapman PGY-1  04/06/19 5:17 AM  Pager: [phone]

## 2019-04-06 NOTE — ED NOTES
TRANSFER - OUT REPORT:    Verbal report given to [facility]. 9name) on Namita Pickett  being transferred to [address] [place] [address] (unit) for routine progression of care       Report consisted of patients Situation, Background, Assessment and   Recommendations(SBAR). Information from the following report(s) SBAR, ED Summary, [facility] and Recent Results was reviewed with the receiving nurse. Lines:   Peripheral IV 04/05/19 Left;Right Forearm (Active)       Peripheral IV 04/05/19 Right Wrist (Active)   Site Assessment Clean, dry, & intact 4/5/2019  7:53 PM   Phlebitis Assessment 0 4/5/2019  7:53 PM   Dressing Status Clean, dry, & intact 4/5/2019  7:53 PM   Hub Color/Line Status Blue 4/5/2019  7:53 PM        Opportunity for questions and clarification was provided.       Patient transported with:  Belongings and oxygen

## 2019-04-06 NOTE — DISCHARGE SUMMARY
Discharge Summary  [facility]      Patient: Troy Perkins Age: 60 y.o. Sex: female  : 1960    MRN: [id number]      DOA: 2019      Discharge Date: 4/10/19      Attending:Andie Pedro MD      PCP: José Mendes MD        ================================================================    Reason for Admission: COPD with acute exacerbation [facility]) [J44.1]    Discharge Diagnoses:   COPD exacerbation  COPD  Diastolic CHF with preserved EF  DMT2  HTN  GERD  Allergic rhinitis  SHERRIE    Important notes to PCP/ follow-up studies and evaluations   -Continue Flonase, Spiriva Respimat, Advair  -Continue Levaquin for two days  -Continue Prednisone 60 mg for two days, 40 mg for three days, 20 mg for three days and lastly 10 mg for three days  -Put in DME order for nebulizer    Pending labs and studies:  None  Operative Procedures:   None    Discharge Medications:     Current Discharge Medication List      START taking these medications    Details   levoFLOXacin (LEVAQUIN) 750 mg tablet Take 1 Tab by mouth every twenty-four (24) hours. Indications: COPD exacebation  Qty: 2 Tab, Refills: 0      predniSONE (DELTASONE) 20 mg tablet Take 20 mg by mouth daily (with breakfast). Prednisone taper: Take 60 mg (3 tablets) for two days. Then 40 mg (two tablets) for three days. Then 20 mg (one tablet) for three days. Then 10 mg (half of one tablet) for three days. Indications: COPD exacerbation  Qty: 17 Tab, Refills: 0         CONTINUE these medications which have NOT CHANGED    Details   fluticasone propionate (FLONASE ALLERGY RELIEF) 50 mcg/actuation nasal spray 2 Sprays by Both Nostrils route daily. fluticasone-salmeterol (ADVAIR HFA) 115-21 mcg/actuation inhaler Take 2 Puffs by inhalation two (2) times a day. hydroCHLOROthiazide (HYDRODIURIL) 12.5 mg tablet Take 12.5 mg by mouth daily.       tiotropium bromide (SPIRIVA RESPIMAT) 2.5 mcg/actuation inhaler Take 2 Puffs by inhalation daily. insulin glargine (LANTUS,BASAGLAR) 100 unit/mL (3 mL) inpn 35 Units by SubCUTAneous route nightly. Qty: 28 Units, Refills: 0      albuterol-ipratropium (DUO-NEB) 2.5 mg-0.5 mg/3 ml nebu 3 mL by Nebulization route every six (6) hours as needed. Qty: 30 Nebule, Refills: 0      albuterol sulfate 90 mcg/actuation aepb Take 1 Puff by inhalation every four (4) hours. Qty: 1 Inhaler, Refills: 0      NOVOLOG FLEXPEN U-100 INSULIN 100 unit/mL inpn Continue home Sliding scale insulin as prior to admission  Qty: 1 Pen, Refills: 0      albuterol (PROVENTIL VENTOLIN) 2.5 mg /3 mL (0.083 %) nebulizer solution 3 mL by Nebulization route every four (4) hours as needed for Wheezing. Indications: BRONCHOSPASM PREVENTION, Chronic Obstructive Pulmonary Disease  Qty: 30 Each, Refills: 0      omeprazole (PRILOSEC) 40 mg capsule Take 40 mg by mouth daily. Indications: gastroesophageal reflux disease      lisinopril (PRINIVIL, ZESTRIL) 40 mg tablet Take 40 mg by mouth daily. Indications: high blood pressure      simethicone (MYLICON) 80 mg chewable tablet Take 80 mg by mouth every six (6) hours as needed for Flatulence. cpap machine kit by Does Not Apply route nightly. OXYGEN-AIR DELIVERY SYSTEMS 2 L by Nasal route continuous. [employer]      aspirin delayed-release 81 mg tablet Take 81 mg by mouth daily. famotidine (PEPCID) 20 mg tablet Take 20 mg by mouth two (2) times daily as needed for Other (reflux). montelukast (SINGULAIR) 10 mg tablet Take 10 mg by mouth nightly. Disposition: Home with Home Health    Consultants:    None    Brief Hospital Course (including pertinent history and physical findings)  Julio Jimenez is a 60 y.o. female with PMH COPD, diastolic CHF, DMT2, HTN, GERD, allergic rhinitis and SHERRIE, now admitted with COPD exacerbation.      COPD exacerbation with lactic acidosis, SIRS 2/4  Patient presented with SOB, dry cough, tachycardia (), tachypnea (RR 28) and elevated lactic (2.72) requiring 6L oxygen NC on admission. Patient recently seen at [facility] on 4/3 for COPD exacerbation and was started on Levaquin and Prednisone. SOB worsened at home despite taking one dose of each, albuterol every 2-3 hours and home inhaler use. Patient continued on duonebs, steroids and levaquin while inpatient. She was given multiple boluses and maintenance IVF to lower her lactic acid. Patient clinically improved during hospitalization. Her lactic normalized and IVF were stopped. Patient HD stable upon discharge. Patient to continue Levaquin to finish 5 day course and prednisone taper. DMT2   On admission, HgbA1c was 8. Patient controlled on lantus 30 units at night and sliding scale insulin. Diastolic CHF with preserved EF, HTN, GERD, Allergic rhinitis  Patient controlled on home medications or hospital equivalents (Lisinopril, HCTZ, ASA, Protonix, Flonase, Famotidine, Simethicone) while hospitalized. Summarized key findings and results (labs, imaging studies, ECHO, cardiac cath, endoscopies, etc):    CBC w/Diff    Lab Results   Component Value Date/Time    WBC 8.6 04/10/2019 04:33 AM    HGB 12.8 04/10/2019 04:33 AM    HCT 36.9 04/10/2019 04:33 AM    PLATELET 327 74/18/6310 04:33 AM    MCV 86.8 04/10/2019 04:33 AM           Chemistry    Lab Results   Component Value Date/Time    Sodium 139 04/10/2019 04:33 AM    Potassium 3.6 04/10/2019 04:33 AM    Chloride 100 04/10/2019 04:33 AM    CO2 31 04/10/2019 04:33 AM    Anion gap 8 04/10/2019 04:33 AM    Glucose 135 (H) 04/10/2019 04:33 AM    BUN 16 04/10/2019 04:33 AM    Creatinine 1.06 04/10/2019 04:33 AM    BUN/Creatinine ratio 15 04/10/2019 04:33 AM    GFR est AA >60 04/10/2019 04:33 AM    GFR est non-AA 53 (L) 04/10/2019 04:33 AM    Calcium 9.8 04/10/2019 04:33 AM         CXR: 4/6/2019  IMPRESSION: Basilar streaky densities.        Functional status and cognitive function:    Ambulatory  Status: alert, cooperative, no distress, appears stated age    Diet: Diabetic    Code status and advanced care plan: Full  [facility] of Contact: Kumar Mcginnis Relationship: Daughter [phone]    Patient Education:  Patient was educated on the following topics prior to discharge: COPD, COPD Exacerbation Plan, COPD Exacerbation    Follow-up:   Follow-up Information     Follow up With Specialties Details Why Contact Info    Hoang Alexander MD Family Practice On 4/12/2019 at 3:20pm [address] [place] [address]  [phone]              ================================================================    Carmel Chaparro MD   [address] Medicine PGY-1  04/10/19 10:12 PM

## 2019-04-06 NOTE — PROGRESS NOTES
7:29 PM Beside shift change report given by Bufford Kussmaul, RN  to PEYTON Bob RN. Report included the information from the Kylie Moeller I/O, and recent results. Denies pain or discomfort. Call light within reach. Bed in low position. 7:59 AM Beside shift change report given by PEYTON Bob RN to Glennda Sandhoff, RN. Report included the information from the Kylie Moeller I/O, and recent results.

## 2019-04-07 LAB
ANION GAP SERPL CALC-SCNC: 5 MMOL/L (ref 3–18)
BASOPHILS # BLD: 0 K/UL (ref 0–0.1)
BASOPHILS NFR BLD: 0 % (ref 0–2)
BUN SERPL-MCNC: 18 MG/DL (ref 7–18)
BUN/CREAT SERPL: 19 (ref 12–20)
CALCIUM SERPL-MCNC: 9.6 MG/DL (ref 8.5–10.1)
CHLORIDE SERPL-SCNC: 104 MMOL/L (ref 100–108)
CO2 SERPL-SCNC: 31 MMOL/L (ref 21–32)
CREAT SERPL-MCNC: 0.96 MG/DL (ref 0.6–1.3)
DIFFERENTIAL METHOD BLD: ABNORMAL
EOSINOPHIL # BLD: 0 K/UL (ref 0–0.4)
EOSINOPHIL NFR BLD: 0 % (ref 0–5)
ERYTHROCYTE [DISTWIDTH] IN BLOOD BY AUTOMATED COUNT: 13.4 % (ref 11.6–14.5)
GLUCOSE BLD STRIP.AUTO-MCNC: 156 MG/DL (ref 70–110)
GLUCOSE BLD STRIP.AUTO-MCNC: 241 MG/DL (ref 70–110)
GLUCOSE BLD STRIP.AUTO-MCNC: 390 MG/DL (ref 70–110)
GLUCOSE BLD STRIP.AUTO-MCNC: 410 MG/DL (ref 70–110)
GLUCOSE SERPL-MCNC: 173 MG/DL (ref 74–99)
HCT VFR BLD AUTO: 36.9 % (ref 35–45)
HGB BLD-MCNC: 12.6 G/DL (ref 12–16)
LACTATE SERPL-SCNC: 4.6 MMOL/L (ref 0.4–2)
LYMPHOCYTES # BLD: 2.7 K/UL (ref 0.9–3.6)
LYMPHOCYTES NFR BLD: 23 % (ref 21–52)
MCH RBC QN AUTO: 29.9 PG (ref 24–34)
MCHC RBC AUTO-ENTMCNC: 34.1 G/DL (ref 31–37)
MCV RBC AUTO: 87.6 FL (ref 74–97)
MONOCYTES # BLD: 0.6 K/UL (ref 0.05–1.2)
MONOCYTES NFR BLD: 5 % (ref 3–10)
NEUTS SEG # BLD: 8.5 K/UL (ref 1.8–8)
NEUTS SEG NFR BLD: 72 % (ref 40–73)
PLATELET # BLD AUTO: 288 K/UL (ref 135–420)
PMV BLD AUTO: 9.4 FL (ref 9.2–11.8)
POTASSIUM SERPL-SCNC: 3.8 MMOL/L (ref 3.5–5.5)
RBC # BLD AUTO: 4.21 M/UL (ref 4.2–5.3)
SODIUM SERPL-SCNC: 140 MMOL/L (ref 136–145)
WBC # BLD AUTO: 11.9 K/UL (ref 4.6–13.2)

## 2019-04-07 PROCEDURE — 74011000250 HC RX REV CODE- 250: Performed by: FAMILY MEDICINE

## 2019-04-07 PROCEDURE — 74011000258 HC RX REV CODE- 258: Performed by: STUDENT IN AN ORGANIZED HEALTH CARE EDUCATION/TRAINING PROGRAM

## 2019-04-07 PROCEDURE — 94640 AIRWAY INHALATION TREATMENT: CPT

## 2019-04-07 PROCEDURE — 80048 BASIC METABOLIC PNL TOTAL CA: CPT

## 2019-04-07 PROCEDURE — 65270000029 HC RM PRIVATE

## 2019-04-07 PROCEDURE — 74011250637 HC RX REV CODE- 250/637: Performed by: FAMILY MEDICINE

## 2019-04-07 PROCEDURE — 74011250637 HC RX REV CODE- 250/637: Performed by: STUDENT IN AN ORGANIZED HEALTH CARE EDUCATION/TRAINING PROGRAM

## 2019-04-07 PROCEDURE — 74011636637 HC RX REV CODE- 636/637: Performed by: FAMILY MEDICINE

## 2019-04-07 PROCEDURE — 82962 GLUCOSE BLOOD TEST: CPT

## 2019-04-07 PROCEDURE — 83605 ASSAY OF LACTIC ACID: CPT

## 2019-04-07 PROCEDURE — 85025 COMPLETE CBC W/AUTO DIFF WBC: CPT

## 2019-04-07 PROCEDURE — 74011250636 HC RX REV CODE- 250/636: Performed by: STUDENT IN AN ORGANIZED HEALTH CARE EDUCATION/TRAINING PROGRAM

## 2019-04-07 PROCEDURE — 74011636637 HC RX REV CODE- 636/637: Performed by: STUDENT IN AN ORGANIZED HEALTH CARE EDUCATION/TRAINING PROGRAM

## 2019-04-07 PROCEDURE — 36415 COLL VENOUS BLD VENIPUNCTURE: CPT

## 2019-04-07 RX ORDER — PREDNISONE 20 MG/1
60 TABLET ORAL
Status: DISCONTINUED | OUTPATIENT
Start: 2019-04-08 | End: 2019-04-10 | Stop reason: HOSPADM

## 2019-04-07 RX ORDER — IPRATROPIUM BROMIDE AND ALBUTEROL SULFATE 2.5; .5 MG/3ML; MG/3ML
3 SOLUTION RESPIRATORY (INHALATION)
Status: DISCONTINUED | OUTPATIENT
Start: 2019-04-07 | End: 2019-04-08

## 2019-04-07 RX ORDER — LEVOFLOXACIN 750 MG/1
750 TABLET ORAL EVERY 24 HOURS
Status: DISCONTINUED | OUTPATIENT
Start: 2019-04-07 | End: 2019-04-10 | Stop reason: HOSPADM

## 2019-04-07 RX ORDER — LISINOPRIL 20 MG/1
20 TABLET ORAL 2 TIMES DAILY
Status: DISCONTINUED | OUTPATIENT
Start: 2019-04-07 | End: 2019-04-10 | Stop reason: HOSPADM

## 2019-04-07 RX ORDER — LISINOPRIL 20 MG/1
20 TABLET ORAL DAILY
Status: COMPLETED | OUTPATIENT
Start: 2019-04-07 | End: 2019-04-07

## 2019-04-07 RX ADMIN — IPRATROPIUM BROMIDE AND ALBUTEROL SULFATE 3 ML: .5; 3 SOLUTION RESPIRATORY (INHALATION) at 16:56

## 2019-04-07 RX ADMIN — HYDROCHLOROTHIAZIDE 12.5 MG: 25 TABLET ORAL at 10:42

## 2019-04-07 RX ADMIN — IPRATROPIUM BROMIDE AND ALBUTEROL SULFATE 3 ML: .5; 3 SOLUTION RESPIRATORY (INHALATION) at 20:01

## 2019-04-07 RX ADMIN — INSULIN LISPRO 4 UNITS: 100 INJECTION, SOLUTION INTRAVENOUS; SUBCUTANEOUS at 13:24

## 2019-04-07 RX ADMIN — SODIUM CHLORIDE 125 ML/HR: 450 INJECTION, SOLUTION INTRAVENOUS at 21:56

## 2019-04-07 RX ADMIN — SODIUM CHLORIDE 100 ML/HR: 450 INJECTION, SOLUTION INTRAVENOUS at 11:03

## 2019-04-07 RX ADMIN — INSULIN GLARGINE 30 UNITS: 100 INJECTION, SOLUTION SUBCUTANEOUS at 22:02

## 2019-04-07 RX ADMIN — INSULIN LISPRO 10 UNITS: 100 INJECTION, SOLUTION INTRAVENOUS; SUBCUTANEOUS at 16:54

## 2019-04-07 RX ADMIN — LEVOFLOXACIN 750 MG: 750 TABLET, FILM COATED ORAL at 22:02

## 2019-04-07 RX ADMIN — HEPARIN SODIUM 5000 UNITS: 5000 INJECTION INTRAVENOUS; SUBCUTANEOUS at 08:33

## 2019-04-07 RX ADMIN — INSULIN LISPRO 15 UNITS: 100 INJECTION, SOLUTION INTRAVENOUS; SUBCUTANEOUS at 22:02

## 2019-04-07 RX ADMIN — IPRATROPIUM BROMIDE AND ALBUTEROL SULFATE 3 ML: .5; 3 SOLUTION RESPIRATORY (INHALATION) at 23:41

## 2019-04-07 RX ADMIN — METHYLPREDNISOLONE SODIUM SUCCINATE 60 MG: 40 INJECTION, POWDER, FOR SOLUTION INTRAMUSCULAR; INTRAVENOUS at 10:49

## 2019-04-07 RX ADMIN — SODIUM CHLORIDE, SODIUM ACETATE ANHYDROUS, SODIUM GLUCONATE, POTASSIUM CHLORIDE, AND MAGNESIUM CHLORIDE 500 ML: 526; 222; 502; 37; 30 INJECTION, SOLUTION INTRAVENOUS at 21:57

## 2019-04-07 RX ADMIN — LISINOPRIL 20 MG: 20 TABLET ORAL at 22:02

## 2019-04-07 RX ADMIN — LISINOPRIL 20 MG: 40 TABLET ORAL at 10:54

## 2019-04-07 RX ADMIN — PANTOPRAZOLE SODIUM 40 MG: 40 TABLET, DELAYED RELEASE ORAL at 10:41

## 2019-04-07 RX ADMIN — HEPARIN SODIUM 5000 UNITS: 5000 INJECTION INTRAVENOUS; SUBCUTANEOUS at 16:55

## 2019-04-07 RX ADMIN — HEPARIN SODIUM 5000 UNITS: 5000 INJECTION INTRAVENOUS; SUBCUTANEOUS at 22:11

## 2019-04-07 RX ADMIN — INSULIN LISPRO 2 UNITS: 100 INJECTION, SOLUTION INTRAVENOUS; SUBCUTANEOUS at 10:45

## 2019-04-07 RX ADMIN — IPRATROPIUM BROMIDE AND ALBUTEROL SULFATE 3 ML: .5; 3 SOLUTION RESPIRATORY (INHALATION) at 13:33

## 2019-04-07 RX ADMIN — MONTELUKAST SODIUM 10 MG: 10 TABLET, FILM COATED ORAL at 22:02

## 2019-04-07 RX ADMIN — ASPIRIN 81 MG: 81 TABLET, COATED ORAL at 10:41

## 2019-04-07 NOTE — PROGRESS NOTES
Intern Progress Note  [facility]       Patient: Deuce Murguia MRN: [id number]  CSN: [id number]    YOB: 1960  Age: 60 y.o. Sex: female    DOA: 4/5/2019 LOS:  LOS: 2 days                    Subjective:     Acute events: VICKY overnight. Patient used home CPAP overnight. Patient endorses improves SOB and continued dry cough. Patient had good PO intake. Review of Systems   Constitutional: Negative for chills and fever. Respiratory: Positive for cough and shortness of breath. Cardiovascular: Negative for chest pain and palpitations. Gastrointestinal: Negative for nausea and vomiting. Objective:      Patient Vitals for the past 24 hrs:   Temp Pulse Resp BP SpO2   04/07/19 0750 97.3 °F (36.3 °C) 71 20 138/79 94 %   04/07/19 0455 97.6 °F (36.4 °C) 80 20 110/69 --   04/07/19 0050 98.1 °F (36.7 °C) 81 19 122/79 --   04/06/19 2040 98.3 °F (36.8 °C) 76 20 100/58 98 %   04/06/19 1631 -- -- -- -- 97 %   04/06/19 1526 98.8 °F (37.1 °C) 79 20 129/80 96 %   04/06/19 1150 98 °F (36.7 °C) 87 20 144/70 95 %         Intake/Output Summary (Last 24 hours) at 4/7/2019 0912  Last data filed at 4/7/2019 0659  Gross per 24 hour   Intake 2298.33 ml   Output 2225 ml   Net 73.33 ml     Physical Exam:  General:  Alert and Responsive and in No acute distress. HEENT: Conjunctiva pink, sclera anicteric. EOMI.  Pharynx moist, nonerythematous.  Moist mucous membranes.    CV:  RRR, no murmurs. No visible pulsations or thrills.    RESP:  Unlabored breathing. Bilateral end-expiratory wheezing. Equal expansion bilaterally.    ABD:  Soft, nontender, nondistended.    MS:  No joint deformity or instability.  No atrophy. Neuro:  5/5 strength bilateral upper extremities and lower extremities.  A+Ox3. Ext:  Trace edema.  2+ radial and dp pulses bilaterally. Skin:  No rashes, lesions, or ulcers.  Good turgor.       Lab/Data Reviewed:  Recent Results (from the past 12 hour(s))   GLUCOSE, POC Collection Time: 04/06/19  9:38 PM   Result Value Ref Range    Glucose (POC) 282 (H) 70 - 277 mg/dL   METABOLIC PANEL, BASIC    Collection Time: 04/07/19  3:44 AM   Result Value Ref Range    Sodium 140 136 - 145 mmol/L    Potassium 3.8 3.5 - 5.5 mmol/L    Chloride 104 100 - 108 mmol/L    CO2 31 21 - 32 mmol/L    Anion gap 5 3.0 - 18 mmol/L    Glucose 173 (H) 74 - 99 mg/dL    BUN 18 7.0 - 18 MG/DL    Creatinine 0.96 0.6 - 1.3 MG/DL    BUN/Creatinine ratio 19 12 - 20      GFR est AA >60 >60 ml/min/1.73m2    GFR est non-AA 60 (L) >60 ml/min/1.73m2    Calcium 9.6 8.5 - 10.1 MG/DL   CBC WITH AUTOMATED DIFF    Collection Time: 04/07/19  3:44 AM   Result Value Ref Range    WBC 11.9 4.6 - 13.2 K/uL    RBC 4.21 4.20 - 5.30 M/uL    HGB 12.6 12.0 - 16.0 g/dL    HCT 36.9 35.0 - 45.0 %    MCV 87.6 74.0 - 97.0 FL    MCH 29.9 24.0 - 34.0 PG    MCHC 34.1 31.0 - 37.0 g/dL    RDW 13.4 11.6 - 14.5 %    PLATELET 720 019 - 283 K/uL    MPV 9.4 9.2 - 11.8 FL    NEUTROPHILS 72 40 - 73 %    LYMPHOCYTES 23 21 - 52 %    MONOCYTES 5 3 - 10 %    EOSINOPHILS 0 0 - 5 %    BASOPHILS 0 0 - 2 %    ABS. NEUTROPHILS 8.5 (H) 1.8 - 8.0 K/UL    ABS. LYMPHOCYTES 2.7 0.9 - 3.6 K/UL    ABS. MONOCYTES 0.6 0.05 - 1.2 K/UL    ABS. EOSINOPHILS 0.0 0.0 - 0.4 K/UL    ABS.  BASOPHILS 0.0 0.0 - 0.1 K/UL    DF AUTOMATED     GLUCOSE, POC    Collection Time: 04/07/19  7:46 AM   Result Value Ref Range    Glucose (POC) 156 (H) 70 - 110 mg/dL         Scheduled Medications Reviewed:  Current Facility-Administered Medications   Medication Dose Route Frequency    methylPREDNISolone (PF) (SOLU-MEDROL) injection 60 mg  60 mg IntraVENous DAILY    0.45% sodium chloride infusion  100 mL/hr IntraVENous CONTINUOUS    levoFLOXacin (LEVAQUIN) 750 mg in D5W IVPB  750 mg IntraVENous Q24H    heparin (porcine) injection 5,000 Units  5,000 Units SubCUTAneous Q8H    aspirin delayed-release tablet 81 mg  81 mg Oral DAILY    fluticasone propionate (FLONASE) 50 mcg/actuation nasal spray 2 Spray  2 Spray Both Nostrils DAILY    hydroCHLOROthiazide (HYDRODIURIL) tablet 12.5 mg  12.5 mg Oral DAILY    lisinopril (PRINIVIL, ZESTRIL) tablet 40 mg  40 mg Oral DAILY    montelukast (SINGULAIR) tablet 10 mg  10 mg Oral QHS    pantoprazole (PROTONIX) tablet 40 mg  40 mg Oral ACB    insulin lispro (HUMALOG) injection   SubCUTAneous AC&HS    insulin glargine (LANTUS) injection 30 Units  30 Units SubCUTAneous QHS         Imaging, microbiology, and EKG/Telemetry:  No results found. Assessment/Plan   60 y.o. female with PMH COPD, diastolic CHF, DMT2, HTN, GERD, allergic rhinitis and SHERRIE, now admitted with COPD exacerbation.     COPD exacerbation with lactic acidosis, SIRS 2/4  On presentation, patient was tachycardic and tachypneic with shortness of breath on 6L oxygen NC. Patient had elevated lactic 2.72 concerning for possible CAP. SIRS 2/4 (RR 28 and ).  Patient recurrently admitted for COPD exacerbations. - CBC, BMP, Mg daily   -1/2NS @ 100 cc/hr   - Duonebs prn  - Levaquin 750 mg IV daily  - IV solumedrol 60 mg daily, switch to prednisone 60 mg  - Continue supplemental oxygen  - CPAP overnight, daughter to bring today  - Continue singular 10 mg every day  - Hold advair 2 puffs BID daily  - Hold spiriva 1.25 mcg/act 2 puffs daily  - Tylenol prn  - PT/OT/CM      Diastolic CHF with preserved EF, HTN. Stable on exam. ECHO 7/2018 EF 66-70% with no wma and LVH. - continue aspirin 81 mg po daily  - Vital signs per unit routine  - Continue lisinopril 40 mg po daily  - Continue hctz 12.5 mg po daily     DMT2 BG on admission 264. Last HgbA1c 8 from 1/18/2019.   - Accuchecks achs  - SSI  - Continue home Lantus 30U qhs  - Consistent carbohydrate diet  - Hemoglobin A1c 8 (4/5)     H/O SHERRIE  - CPAP qhs     GERD, Allergic rhinitis  - Hold home omeprazole 40 mg po daily   - Continue protonix 40 mg po daily  - Continue home famotidine 20 mg po bid prn   - Continue home simethicone prn   - Continue flonase     Diet: Diabetic  DVT Prophylaxis: SQH  Code Status: Full  Point of Contact: Caryn Relationship: Daughter [phone]     Disposition and anticipated LOS: 3 midnights    MD Kelli Sahu PGY-1  04/07/19 9:12 AM  Pager: [phone]

## 2019-04-07 NOTE — PROGRESS NOTES
Bedside and Verbal shift change report given to Monty Aleman (oncoming nurse) by Norman Monsivais   (offgoing nurse). Report included the following information SBAR, Kardex, ED Summary, Procedure Summary, Intake/Output, MAR and Recent Results. Visit Vitals  /80 (BP 1 Location: Left arm, BP Patient Position: At rest)   Pulse 79   Temp 98.8 °F (37.1 °C)   Resp 20   Ht 5' 3\" (1.6 m)   Wt 115.7 kg (255 lb 1.6 oz)   SpO2 97%   Breastfeeding?  No   BMI 45.19 kg/m²

## 2019-04-07 NOTE — PROGRESS NOTES
PT orders received, chart reviewed. Pt unable to participate with PT due to:  []  Nausea/vomiting  [x]  Eating, lunch  []  Pain  []  Pt lethargic  []  Off Unit  []  Pt refused  []  Other   Will f/u tomorrow as patient's schedule allows.  Thank you for this referral.  Kaylene Bamberger, PT, DPT

## 2019-04-07 NOTE — PROGRESS NOTES
This patient meets the following P&T approved criteria and has been converted from IV to oral therapy for the following medication: Levofloxacin    1. INITIAL IV Therapy  Patient has received an initial 48 hours of IV therapy for azithromycin and levetiracetam OR received an initial 24 hours of IV therapy for all other medications. 2. Clinically Stable   HR?100 Pulse (Heart Rate): 67   SBP ?90 BP: 132/76   RR ? 24 Resp Rate: 18   Temp < 100.4° F Temp: 97.2 °F (36.2 °C)   O2 ?90% O2 Sat (%): 98 %   3. Functional GI Tract  Please note that a ? No? response means the patient is not a candidate for IV to PO conversion   No active NPO order (check order review activity) Yes   Taking enteral meds (PO, NG, GT, etc)   (check medication activity or order review for a tube) Yes   Able to tolerate enteral medications without risk of aspiration   (check progress notes) Yes   Non-tubed patients can swallow without difficulty  (check progress notes) Yes   Eating or tolerating feeds at goal rate   (check progress notes, order review activity, LDA flowsheet)  Diet =Diabetic  Yes   NG access is available if patient is unconscious Yes   NG tube, if present,  is not on continuous suction   (check progress notes) Yes   Continuous tube feedings can be interrupted for an extended period of time for the drug administration (e.g. Ciprofloxacin)   (check progress notes, order review) Yes   No severe or persistent nausea or vomiting       (check progress notes, use of antiemetics) Yes   No malabsorption  syndromes   (e.g. gastrectomy, intestinal resection, bariatric surgery, uncontrolled diarrhea, short bowel syndrome, ileus, gastrointestinal obstruction)   (check progress notes) Yes   No active gastrointestinal bleeding     (check progress notes)   Yes   4. Infection Specific Criteria (ABX only)  Please note that a ? No? response means the patient is not a candidate for IV to PO conversion   WBC normal or normalizing (check labs) Yes    WBC =  Lab Results   Component Value Date/Time    WBC 11.9 04/07/2019 03:44 AM        Neutropenia (ANC < 1500 cells/microL) NOT present  (check labs)   Yes   No infection with high treatment failure rate  (e.g. meningitis, brain abscess,orbital cellulitis, other central nervous system infections, endophthalmitis, mediastinitis,  IV TMP/SMX treatment for PCP in AIDs, fungemia, ventriculitis, endocarditis, Pseudomonas pneumonia, intra-abdominal abscess, empyema, necrotizing soft tissue infections, osteomyelitis or post-obstructive pneumonia) (check indication for antibiotic, progress notes) Yes   5. Miscellaneous Criteria    Please note that a ? No? response means the patient is not a candidate for IV to PO conversion   Not on high doses of vasopressor medications        (check medication activity) Yes   Not actively seizing or risk of actively seizing        (check progress notes) Yes     Pharmacist Notes: Levofloxacin day 3 PO  Pharmacy will continue to monitor for the duration of therapy to ensure the patient continues to meet protocol criteria and the conversion remains appropriate.     Coni Crawford, Pharmacist  4/7/2019 2:11 PM

## 2019-04-08 LAB
ANION GAP SERPL CALC-SCNC: 7 MMOL/L (ref 3–18)
BASOPHILS # BLD: 0 K/UL (ref 0–0.1)
BASOPHILS NFR BLD: 0 % (ref 0–2)
BUN SERPL-MCNC: 21 MG/DL (ref 7–18)
BUN/CREAT SERPL: 21 (ref 12–20)
CALCIUM SERPL-MCNC: 9.7 MG/DL (ref 8.5–10.1)
CHLORIDE SERPL-SCNC: 100 MMOL/L (ref 100–108)
CO2 SERPL-SCNC: 29 MMOL/L (ref 21–32)
CREAT SERPL-MCNC: 1 MG/DL (ref 0.6–1.3)
DIFFERENTIAL METHOD BLD: ABNORMAL
EOSINOPHIL # BLD: 0 K/UL (ref 0–0.4)
EOSINOPHIL NFR BLD: 0 % (ref 0–5)
ERYTHROCYTE [DISTWIDTH] IN BLOOD BY AUTOMATED COUNT: 13.3 % (ref 11.6–14.5)
GLUCOSE BLD STRIP.AUTO-MCNC: 137 MG/DL (ref 70–110)
GLUCOSE BLD STRIP.AUTO-MCNC: 192 MG/DL (ref 70–110)
GLUCOSE BLD STRIP.AUTO-MCNC: 245 MG/DL (ref 70–110)
GLUCOSE BLD STRIP.AUTO-MCNC: 303 MG/DL (ref 70–110)
GLUCOSE SERPL-MCNC: 213 MG/DL (ref 74–99)
HCT VFR BLD AUTO: 36.9 % (ref 35–45)
HGB BLD-MCNC: 12.4 G/DL (ref 12–16)
LACTATE SERPL-SCNC: 2.1 MMOL/L (ref 0.4–2)
LACTATE SERPL-SCNC: 2.9 MMOL/L (ref 0.4–2)
LACTATE SERPL-SCNC: 3.2 MMOL/L (ref 0.4–2)
LACTATE SERPL-SCNC: 4.3 MMOL/L (ref 0.4–2)
LYMPHOCYTES # BLD: 1.6 K/UL (ref 0.9–3.6)
LYMPHOCYTES NFR BLD: 16 % (ref 21–52)
MCH RBC QN AUTO: 29.6 PG (ref 24–34)
MCHC RBC AUTO-ENTMCNC: 33.6 G/DL (ref 31–37)
MCV RBC AUTO: 88.1 FL (ref 74–97)
MONOCYTES # BLD: 0.5 K/UL (ref 0.05–1.2)
MONOCYTES NFR BLD: 5 % (ref 3–10)
NEUTS SEG # BLD: 7.9 K/UL (ref 1.8–8)
NEUTS SEG NFR BLD: 79 % (ref 40–73)
PLATELET # BLD AUTO: 306 K/UL (ref 135–420)
PMV BLD AUTO: 9.2 FL (ref 9.2–11.8)
POTASSIUM SERPL-SCNC: 3.9 MMOL/L (ref 3.5–5.5)
RBC # BLD AUTO: 4.19 M/UL (ref 4.2–5.3)
SODIUM SERPL-SCNC: 136 MMOL/L (ref 136–145)
WBC # BLD AUTO: 10 K/UL (ref 4.6–13.2)

## 2019-04-08 PROCEDURE — 77010033678 HC OXYGEN DAILY

## 2019-04-08 PROCEDURE — 65270000029 HC RM PRIVATE

## 2019-04-08 PROCEDURE — 94640 AIRWAY INHALATION TREATMENT: CPT

## 2019-04-08 PROCEDURE — 85025 COMPLETE CBC W/AUTO DIFF WBC: CPT

## 2019-04-08 PROCEDURE — 36415 COLL VENOUS BLD VENIPUNCTURE: CPT

## 2019-04-08 PROCEDURE — 74011636637 HC RX REV CODE- 636/637: Performed by: STUDENT IN AN ORGANIZED HEALTH CARE EDUCATION/TRAINING PROGRAM

## 2019-04-08 PROCEDURE — 82962 GLUCOSE BLOOD TEST: CPT

## 2019-04-08 PROCEDURE — 74011636637 HC RX REV CODE- 636/637: Performed by: FAMILY MEDICINE

## 2019-04-08 PROCEDURE — 74011000258 HC RX REV CODE- 258: Performed by: STUDENT IN AN ORGANIZED HEALTH CARE EDUCATION/TRAINING PROGRAM

## 2019-04-08 PROCEDURE — 97161 PT EVAL LOW COMPLEX 20 MIN: CPT

## 2019-04-08 PROCEDURE — 74011250637 HC RX REV CODE- 250/637: Performed by: STUDENT IN AN ORGANIZED HEALTH CARE EDUCATION/TRAINING PROGRAM

## 2019-04-08 PROCEDURE — 74011000250 HC RX REV CODE- 250: Performed by: STUDENT IN AN ORGANIZED HEALTH CARE EDUCATION/TRAINING PROGRAM

## 2019-04-08 PROCEDURE — 80048 BASIC METABOLIC PNL TOTAL CA: CPT

## 2019-04-08 PROCEDURE — 74011250636 HC RX REV CODE- 250/636: Performed by: STUDENT IN AN ORGANIZED HEALTH CARE EDUCATION/TRAINING PROGRAM

## 2019-04-08 PROCEDURE — 74011250637 HC RX REV CODE- 250/637: Performed by: FAMILY MEDICINE

## 2019-04-08 PROCEDURE — 83605 ASSAY OF LACTIC ACID: CPT

## 2019-04-08 PROCEDURE — 97530 THERAPEUTIC ACTIVITIES: CPT

## 2019-04-08 PROCEDURE — 97116 GAIT TRAINING THERAPY: CPT

## 2019-04-08 PROCEDURE — 97165 OT EVAL LOW COMPLEX 30 MIN: CPT

## 2019-04-08 RX ORDER — IPRATROPIUM BROMIDE AND ALBUTEROL SULFATE 2.5; .5 MG/3ML; MG/3ML
3 SOLUTION RESPIRATORY (INHALATION)
Status: DISCONTINUED | OUTPATIENT
Start: 2019-04-08 | End: 2019-04-10 | Stop reason: HOSPADM

## 2019-04-08 RX ORDER — SODIUM CHLORIDE 9 MG/ML
500 INJECTION, SOLUTION INTRAVENOUS ONCE
Status: COMPLETED | OUTPATIENT
Start: 2019-04-08 | End: 2019-04-08

## 2019-04-08 RX ORDER — BUDESONIDE AND FORMOTEROL FUMARATE DIHYDRATE 160; 4.5 UG/1; UG/1
2 AEROSOL RESPIRATORY (INHALATION)
Status: DISCONTINUED | OUTPATIENT
Start: 2019-04-08 | End: 2019-04-10 | Stop reason: HOSPADM

## 2019-04-08 RX ADMIN — IPRATROPIUM BROMIDE AND ALBUTEROL SULFATE 3 ML: .5; 3 SOLUTION RESPIRATORY (INHALATION) at 06:51

## 2019-04-08 RX ADMIN — LEVOFLOXACIN 750 MG: 750 TABLET, FILM COATED ORAL at 21:45

## 2019-04-08 RX ADMIN — INSULIN LISPRO 6 UNITS: 100 INJECTION, SOLUTION INTRAVENOUS; SUBCUTANEOUS at 21:46

## 2019-04-08 RX ADMIN — HEPARIN SODIUM 5000 UNITS: 5000 INJECTION INTRAVENOUS; SUBCUTANEOUS at 06:48

## 2019-04-08 RX ADMIN — HEPARIN SODIUM 5000 UNITS: 5000 INJECTION INTRAVENOUS; SUBCUTANEOUS at 19:19

## 2019-04-08 RX ADMIN — INSULIN GLARGINE 30 UNITS: 100 INJECTION, SOLUTION SUBCUTANEOUS at 21:45

## 2019-04-08 RX ADMIN — MONTELUKAST SODIUM 10 MG: 10 TABLET, FILM COATED ORAL at 21:44

## 2019-04-08 RX ADMIN — SODIUM CHLORIDE 125 ML/HR: 450 INJECTION, SOLUTION INTRAVENOUS at 19:19

## 2019-04-08 RX ADMIN — LISINOPRIL 20 MG: 20 TABLET ORAL at 09:50

## 2019-04-08 RX ADMIN — PREDNISONE 60 MG: 20 TABLET ORAL at 09:50

## 2019-04-08 RX ADMIN — IPRATROPIUM BROMIDE AND ALBUTEROL SULFATE 3 ML: .5; 3 SOLUTION RESPIRATORY (INHALATION) at 21:47

## 2019-04-08 RX ADMIN — PANTOPRAZOLE SODIUM 40 MG: 40 TABLET, DELAYED RELEASE ORAL at 09:50

## 2019-04-08 RX ADMIN — SODIUM CHLORIDE 125 ML/HR: 450 INJECTION, SOLUTION INTRAVENOUS at 09:49

## 2019-04-08 RX ADMIN — BUDESONIDE AND FORMOTEROL FUMARATE DIHYDRATE 2 PUFF: 160; 4.5 AEROSOL RESPIRATORY (INHALATION) at 21:41

## 2019-04-08 RX ADMIN — INSULIN LISPRO 3 UNITS: 100 INJECTION, SOLUTION INTRAVENOUS; SUBCUTANEOUS at 12:29

## 2019-04-08 RX ADMIN — ASPIRIN 81 MG: 81 TABLET, COATED ORAL at 09:50

## 2019-04-08 RX ADMIN — INSULIN LISPRO 12 UNITS: 100 INJECTION, SOLUTION INTRAVENOUS; SUBCUTANEOUS at 19:17

## 2019-04-08 RX ADMIN — HYDROCHLOROTHIAZIDE 12.5 MG: 25 TABLET ORAL at 09:50

## 2019-04-08 RX ADMIN — SODIUM CHLORIDE 500 ML: 900 INJECTION, SOLUTION INTRAVENOUS at 23:04

## 2019-04-08 RX ADMIN — HEPARIN SODIUM 5000 UNITS: 5000 INJECTION INTRAVENOUS; SUBCUTANEOUS at 21:46

## 2019-04-08 RX ADMIN — LISINOPRIL 20 MG: 20 TABLET ORAL at 21:45

## 2019-04-08 NOTE — PROGRESS NOTES
Bedside and Verbal shift change report received from  [facility], [address] [place] [address] (off going nurse). Report included the following information SBAR, Kardex, MAR and Recent Results. Assumed care patient in bed awake watching TV. Fall prevention program maintained,call bell and bedside table within reach. No problems identified at this time,no complaints made by patient ,will continue care. 1930- Bedside and Verbal shift change report given to Rhea Martinez RN (oncoming nurse) by Hoang Elmore RN (offgoing nurse).  Report included the following information SBAR, Kardex, MAR and Recent Results.

## 2019-04-08 NOTE — DIABETES MGMT
Diabetes Patient/Family Education Record  Factors That  May Influence Patients Ability  to Learn or  Comply with Recommendations   []   Language barrier    []   Cultural needs   []   Motivation    []   Cognitive limitation    []   Physical   []   Education    []   Physiological factors   []   Hearing/vision/speaking impairment   []   Episcopalian beliefs    []   Financial factors   []  Other:   [x]  No factors identified at this time.      Person Instructed:   [x]   Patient   []   Family   []  Other     Preference for Learning:   [x]   Verbal   []   Written   []  Demonstration     Level of Comprehension & Competence:    []  Good                                      [x] Fair                                     []  Poor                             []  Needs Reinforcement   [x]  Teachback completed    Education Component:   [x]  Medication management, including how to administer insulin (if appropriate) and potential medication interactions    [x]  Nutritional management    []  Exercise   []  Signs, symptoms, and treatment of hyperglycemia and hypoglycemia   [] Prevention, recognition and treatment of hyperglycemia and hypoglycemia   [x]  Importance of blood glucose monitoring and how to obtain a blood glucose meter    []  Instruction on use of the blood glucose meter   [x]  Discuss the importance of HbA1C monitoring    []  Sick day guidelines   []  Proper use and disposal of lancets, needles, syringes or insulin pens (if appropriate)   []  Potential long-term complications (retinopathy, kidney disease, neuropathy, foot care)   [] Information about whom to contact in case of emergency or for more information    [x]  Goal:  Patient/family will demonstrate understanding of Diabetes Self Management Skills by: 4/15/19  Plan for post-discharge education or self-management support:    [x] Outpatient class schedule provided            [] Patient Declined    [] Scheduled for outpatient classes (date) _______  Verify:  Does patient understand how diabetes medications work?  yes  Does patient know what their most recent A1c is? yes  Does patient monitor glucose at home? yes  Does patient have difficulty obtaining diabetes medications or testing supplies? no     Angelika Houston RD, CDE  pgr [phone]

## 2019-04-08 NOTE — PROGRESS NOTES
Reason for Admission:   COPD with acute exacerbation ([facility] [J44.1]               RRAT Score:    33             Resources/supports as identified by patient/family:  Patient has family supports. Top Challenges facing patient (as identified by patient/family and CM): Finances/Medication cost?     NO    Transportation     Patient's family will transport home. Either her daughter or her sister. Support system or lack thereof? Patient has a supportive family system. Living arrangements? Patient resides with her daughter Mikki Craft). Self-care/ADLs/Cognition? Alert and oriented. Current Advanced Directive/Advance Care Plan:   no                          Plan for utilizing home health:  Patient has no home health orders; however, she is active with a personal care agency. This writer completed freedom of choice for [facility]. She will likely need home health returning home. Likelihood of readmission:   HIGH    Transition of Care Plan:    Patient will return home with help from her family. She will have her personal care aide services restarted and she will be set up with home health services [facility]). Patient's family will transport home. Initial assessment completed with patient. Cognitive status of patient: Alert and oriented. Face sheet information confirmed:  yes. The patient designates her daughter Mikki Craft) and her sister (Aileen Damon) to participate in her discharge plan and to receive any needed information. This patient lives in a house with her daughter Mikki Craft). Patient is able to navigate steps as needed, but slowly. Prior to hospitalization, patient was considered to be independent with ADLs/IADLS : yes . Patient has a current ACP document on file: no     Patient's family will be available to transport patient home upon discharge.    The patient already has home O2, a CPAP, a bedside commode and a walker available in the home. She reports to needing a new nebulizer machine. She has had the machine for years and it is no longer functioning. Patient is not currently active with home health. She is active with a personal care agency. Patient has not stayed in a skilled nursing facility or rehab. List of available Home Health agencies were provided and reviewed with the patient prior to discharge. Freedom of choice signed: yes, for [facility]. Currently, the discharge plan is to return home with help from her family, restart her personal care aide services and be set up with [address] [place] [address] health. Patient's family will transport home at the time of discharge. Patient's daughter is (Jorge Luis Liz, FX#[phone] and P-HH#[phone]). Patient's sister is Tommy Cunha, XC#[phone]). Patient's PCP is Ashly Chen. Patient is insured through Medicare and she also has [employer]. The patient states that she can obtain her medications from the pharmacy, and take her medications as directed. This writer will continue to closely monitor for discharge planning to ensure a safe discharge home from [facility]. Care Management Interventions  PCP Verified by CM: Yes(Wayne Sheehan)  Palliative Care Criteria Met (RRAT>21 & CHF Dx)?: No  Mode of Transport at Discharge: Other (see comment)(Family will transport home.)  Transition of Care Consult (CM Consult): Discharge Planning, [address]: Yes  Current Support Network: Own Home(Lives with her daughter Carole Dias). )  Confirm Follow Up Transport: Family  Plan discussed with Pt/Family/Caregiver: Yes  Freedom of Choice Offered: Yes  Discharge Location  Discharge Placement: Home with home health        Chuy Trujillo MSW  Care Manager  Pager#: [phone]

## 2019-04-08 NOTE — DIABETES MGMT
NUTRITIONAL ASSESSMENT GLYCEMIC CONTROL/ PLAN OF CARE     Parish Ricks           60 y.o.           4/5/2019                 1. Acute exacerbation of chronic obstructive pulmonary disease (COPD) ([address]    2. SOB (shortness of breath)       INTERVENTIONS/PLAN:   Monitor need to increase Lantus insulin if glucose remains elevated. ASSESSMENT:   Pt is a 60year old female with a past medical history significant for COPD, type 2 diabetes, hypertension, and SHERRIE who presented with SOB. Blood glucose elevated upon admission. Pt is receiving home dose of Lantus insulin along with correctional Lispro insulin coverage. Pt is receiving prednisone 60 mg daily with breakfast. Pt states she always has difficulty controlling her blood sugar when she is on steroids. States when she is not on steroids her sugar is well controlled. Pt has no difficulty getting her insulin or testing supplies. Provided information on free outpatient diabetes education classes.      Diabetes Management:   Recent blood glucose:     4/7/2019 16:33 4/7/2019 20:45 4/8/2019 08:35 4/8/2019 11:27   410 (HH) 390 (H) 137 (H) 192 (H)   Within target range (non-ICU: <140; ICU<180): [] Yes   [x]  No    Current Insulin regimen:   Lantus insulin 30 units every bedtime   Correctional Lispro insulin 4 times daily ACHS (very resistant)  Home medication/insulin regimen:   Lantus insulin 30 units nightly   Novolog insulin per sliding scale   HbA1c: 8% (estimated average glucose of 183 mg/dL)  Adequate glycemic control PTA:  [] Yes  [x] No     SUBJECTIVE/OBJECTIVE:   Information obtained from: patient, chart review     Diet: diabetic consistent carbohydrate     Patient Vitals for the past 100 hrs:   % Diet Eaten   04/06/19 0927 100 %     Medications: [x] Reviewed     Most Recent POC Glucose:   Recent Labs     04/08/19  0142 04/07/19  0344 04/06/19  0103 04/05/19  1912   * 173* 337* 264*       Labs:   Lab Results   Component Value Date/Time Hemoglobin A1c 8.0 (H) 04/05/2019 07:12 PM     Lab Results   Component Value Date/Time    Sodium 136 04/08/2019 01:42 AM    Potassium 3.9 04/08/2019 01:42 AM    Chloride 100 04/08/2019 01:42 AM    CO2 29 04/08/2019 01:42 AM    Anion gap 7 04/08/2019 01:42 AM    Glucose 213 (H) 04/08/2019 01:42 AM    BUN 21 (H) 04/08/2019 01:42 AM    Creatinine 1.00 04/08/2019 01:42 AM    Calcium 9.7 04/08/2019 01:42 AM    Magnesium 2.8 (H) 04/05/2019 07:12 PM    Phosphorus 3.1 01/19/2019 06:51 AM    Albumin 3.9 04/05/2019 07:12 PM     Anthropometrics:   BMI (calculated): 44.5  Wt Readings from Last 1 Encounters:   04/08/19 113.9 kg (251 lb 1.6 oz)      Ht Readings from Last 1 Encounters:   04/05/19 5' 3\" (1.6 m)     Estimated Nutrition Needs: 1323-0203 Kcal/day, 54-68 grams protein/day   Based on:   []  Actual BW    [] IBW   [x] Adjusted BW  (68 kg)    Nutrition Diagnoses:    Altered nutrition related lab value related to diabetes as evidenced by Hemoglobin A1c of 8.0%  Nutrition Interventions: assessment of home management  Goal: Blood glucose will be within target range of  mg/dL by 4/11/19     Nutrition Monitoring and Evaluation    []     Monitor po intake on meal rounds  [x]     Continue inpatient monitoring and intervention  []     Other:    Daren Fairchild RD, CDE  pgr [phone]

## 2019-04-08 NOTE — PROGRESS NOTES
Intern Progress Note  [facility]       Patient: Hillary Hopkins MRN: [id number]  CSN: [id number]    YOB: 1960  Age: 60 y.o. Sex: female    DOA: 4/5/2019 LOS:  LOS: 3 days                    Subjective:     Acute events: VICKY overnight. Patient use home CPAP overnight. Patient reports improved SOB. Denies CP or palpitations. Patient has good PO intake, moving bowels and no difficulty with urination. Patient given 500 cc bolus yesterday for elevated lactic acid. Review of Systems   Constitutional: Negative for chills and fever. Respiratory: Positive for cough and shortness of breath. Cardiovascular: Negative for chest pain and palpitations. Gastrointestinal: Negative for nausea and vomiting. Objective:      Patient Vitals for the past 24 hrs:   Temp Pulse Resp BP SpO2   04/08/19 0427 97.5 °F (36.4 °C) 67 18 145/70 94 %   04/07/19 2348 97.3 °F (36.3 °C) 77 18 133/71 99 %   04/07/19 2342 -- -- -- -- 96 %   04/07/19 2007 97.4 °F (36.3 °C) 81 18 107/74 97 %   04/07/19 2002 -- -- -- -- 95 %   04/07/19 1141 97.2 °F (36.2 °C) 67 18 132/76 98 %   04/07/19 0750 97.3 °F (36.3 °C) 71 20 138/79 94 %         Intake/Output Summary (Last 24 hours) at 4/8/2019 0639  Last data filed at 4/8/2019 0557  Gross per 24 hour   Intake 1995 ml   Output 900 ml   Net 1095 ml     Physical Exam:  General:  Alert and Responsive and in No acute distress. HEENT: Conjunctiva pink, sclera anicteric. EOMI.  Pharynx moist, nonerythematous.  Moist mucous membranes.    CV:  RRR, no murmurs. No visible pulsations or thrills.    RESP:  Unlabored breathing. Bilateral end-expiratory wheezing. Equal expansion bilaterally.   3L Oxygen NC  ABD:  Soft, nontender, nondistended.    MS:  No joint deformity or instability.  No atrophy. Neuro:  5/5 strength bilateral upper extremities and lower extremities.  A+Ox3. Ext:  Trace edema.  2+ radial and dp pulses bilaterally.   Skin:  No rashes, lesions, or ulcers.  Good turgor. Lab/Data Reviewed:  Recent Results (from the past 12 hour(s))   LACTIC ACID    Collection Time: 04/07/19  7:33 PM   Result Value Ref Range    Lactic acid 4.6 (HH) 0.4 - 2.0 MMOL/L   GLUCOSE, POC    Collection Time: 04/07/19  8:45 PM   Result Value Ref Range    Glucose (POC) 390 (H) 70 - 731 mg/dL   METABOLIC PANEL, BASIC    Collection Time: 04/08/19  1:42 AM   Result Value Ref Range    Sodium 136 136 - 145 mmol/L    Potassium 3.9 3.5 - 5.5 mmol/L    Chloride 100 100 - 108 mmol/L    CO2 29 21 - 32 mmol/L    Anion gap 7 3.0 - 18 mmol/L    Glucose 213 (H) 74 - 99 mg/dL    BUN 21 (H) 7.0 - 18 MG/DL    Creatinine 1.00 0.6 - 1.3 MG/DL    BUN/Creatinine ratio 21 (H) 12 - 20      GFR est AA >60 >60 ml/min/1.73m2    GFR est non-AA 57 (L) >60 ml/min/1.73m2    Calcium 9.7 8.5 - 10.1 MG/DL   CBC WITH AUTOMATED DIFF    Collection Time: 04/08/19  1:42 AM   Result Value Ref Range    WBC 10.0 4.6 - 13.2 K/uL    RBC 4.19 (L) 4.20 - 5.30 M/uL    HGB 12.4 12.0 - 16.0 g/dL    HCT 36.9 35.0 - 45.0 %    MCV 88.1 74.0 - 97.0 FL    MCH 29.6 24.0 - 34.0 PG    MCHC 33.6 31.0 - 37.0 g/dL    RDW 13.3 11.6 - 14.5 %    PLATELET 179 428 - 714 K/uL    MPV 9.2 9.2 - 11.8 FL    NEUTROPHILS 79 (H) 40 - 73 %    LYMPHOCYTES 16 (L) 21 - 52 %    MONOCYTES 5 3 - 10 %    EOSINOPHILS 0 0 - 5 %    BASOPHILS 0 0 - 2 %    ABS. NEUTROPHILS 7.9 1.8 - 8.0 K/UL    ABS. LYMPHOCYTES 1.6 0.9 - 3.6 K/UL    ABS. MONOCYTES 0.5 0.05 - 1.2 K/UL    ABS. EOSINOPHILS 0.0 0.0 - 0.4 K/UL    ABS.  BASOPHILS 0.0 0.0 - 0.1 K/UL    DF AUTOMATED     LACTIC ACID    Collection Time: 04/08/19  1:54 AM   Result Value Ref Range    Lactic acid 4.3 (HH) 0.4 - 2.0 MMOL/L         Scheduled Medications Reviewed:  Current Facility-Administered Medications   Medication Dose Route Frequency    albuterol-ipratropium (DUO-NEB) 2.5 MG-0.5 MG/3 ML  3 mL Nebulization Q4H RT    lisinopril (PRINIVIL, ZESTRIL) tablet 20 mg  20 mg Oral BID    levoFLOXacin (LEVAQUIN) tablet 750 mg  750 mg Oral Q24H    predniSONE (DELTASONE) tablet 60 mg  60 mg Oral DAILY WITH BREAKFAST    0.45% sodium chloride infusion  125 mL/hr IntraVENous CONTINUOUS    heparin (porcine) injection 5,000 Units  5,000 Units SubCUTAneous Q8H    aspirin delayed-release tablet 81 mg  81 mg Oral DAILY    fluticasone propionate (FLONASE) 50 mcg/actuation nasal spray 2 Spray  2 Spray Both Nostrils DAILY    hydroCHLOROthiazide (HYDRODIURIL) tablet 12.5 mg  12.5 mg Oral DAILY    montelukast (SINGULAIR) tablet 10 mg  10 mg Oral QHS    pantoprazole (PROTONIX) tablet 40 mg  40 mg Oral ACB    insulin lispro (HUMALOG) injection   SubCUTAneous AC&HS    insulin glargine (LANTUS) injection 30 Units  30 Units SubCUTAneous QHS         Imaging, microbiology, and EKG/Telemetry:  No results found. All Micro Results     Procedure Component Value Units Date/Time    CULTURE, BLOOD [[id number]] Collected:  04/05/19 1915    Order Status:  Completed Specimen:  Blood Updated:  04/07/19 0614     Special Requests: NO SPECIAL REQUESTS        Culture result: NO GROWTH 2 DAYS       CULTURE, BLOOD [[id number]] Collected:  04/05/19 1930    Order Status:  Completed Specimen:  Blood Updated:  04/07/19 0614     Special Requests: NO SPECIAL REQUESTS        Culture result: NO GROWTH 2 DAYS               Assessment/Plan   60 y.o. female with PMH COPD, diastolic CHF, DMT2, HTN, GERD, allergic rhinitis and SHERRIE, now admitted with COPD exacerbation.     COPD exacerbation with lactic acidosis, SIRS 2/4  On presentation, patient was tachycardic and tachypneic with shortness of breath on 6L oxygen NC. Patient had elevated lactic 2.72 concerning for possible CAP. SIRS 2/4 (RR 28 and ).  Patient recurrently admitted for COPD exacerbations.   - CBC, BMP, Mg daily   - Normosol @ 125 cc/hr   - Duonebs prn  - Levaquin 750 mg PO daily  - Prednisone 60 mg  - Continue supplemental oxygen  - CPAP overnight  - Continue singular 10 mg every day  - Hold advair 2 puffs BID daily  - Hold spiriva 1.25 mcg/act 2 puffs daily  - symbicort 160-4.5 mcg/act 2 puffs BID  - spiriva 18 mcg daily  - Tylenol prn  - PT/OT/CM  - consider respiratory culture      Diastolic CHF with preserved EF, HTN. Stable on exam. ECHO 7/2018 EF 66-70% with no wma and LVH. - continue aspirin 81 mg po daily  - Vital signs per unit routine  - Continue lisinopril 40 mg po daily  - Continue hctz 12.5 mg po daily     DMT2 BG on admission 264. Last HgbA1c 8 from 1/18/2019.   - Accuchecks achs  - SSI  - Continue home Lantus 30U qhs  - Consistent carbohydrate diet  - Hemoglobin A1c 8 (4/5)     H/O SHERRIE  - CPAP qhs     GERD, Allergic rhinitis  - Hold home omeprazole 40 mg po daily   - Continue protonix 40 mg po daily  - Continue home famotidine 20 mg po bid prn   - Continue home simethicone prn   - Continue flonase     Diet: Diabetic  DVT Prophylaxis: [facility]  Code Status: Full  Point of Contact: Caryn Relationship: Daughter [phone]     Disposition and anticipated LOS: 3 midnights    Dalton Younger MD   [address] PGY-1  04/08/19 9:12 AM  Pager: [phone]

## 2019-04-08 NOTE — PROGRESS NOTES
Bedside shift change report given to Kalie Gruber (oncoming nurse) by Shannen Frazier (offgoing nurse). Report included the following information SBAR, Kardex and MAR. Pt sitting up in bed. No c/o pain. Bed in lowest position and call bell in reach. Bedside shift change report given to Shannen Frazier (oncoming nurse) by Kalie Gruber (offgoing nurse). Report included the following information SBAR, Kardex and MAR.

## 2019-04-08 NOTE — PROGRESS NOTES
Problem: Self Care Deficits Care Plan (Adult)  Goal: *Acute Goals and Plan of Care (Insert Text)  Outcome: Resolved/Met   OCCUPATIONAL THERAPY EVALUATION/DISCHARGE    Patient: Odalys Mancilla (19 y.o. female)  Date: 4/8/2019  Primary Diagnosis: COPD with acute exacerbation ([facility] [address] [J44.1]        Precautions: O2     PLOF: Pt reports she lives with her daughter and grandchildren. She was (I) with basic self-care/ADLs PTA. However she has a caregiver aid (hours: 9am-1:30 pm) that assists with IADLs and transfers. The pt does not drive, but her daughter or sister assists her to get to her destination when needed. ASSESSMENT AND RECOMMENDATIONS:  Pt cleared to participate in OT evaluation by RN. Based on the objective data described below, the patient presents she is able to perform most basic self-care/ADL tasks independently, requiring supervision for bathroom mobility for toilet transfers and bathing. Will defer to PT for functional balance and mobility tasks. Educated pt on the role of OT, evaluation process, shower chair vs tub transfer bench, and provided pt with a handout on energy conservation techniques for basic self-care tasks with pt demonstrating good understanding. The pt has a supportive care aide to assist her when she returns home PRN. Skilled occupational therapy is not indicated at this time. Discharge Recommendations: Home Health  Further Equipment Recommendations for Discharge: Tub transfer bench     SUBJECTIVE:   Patient stated ? I have an aide that comes 5x a week?     OBJECTIVE DATA SUMMARY:     Past Medical History:   Diagnosis Date    Asthma     Chronic lung disease     COPD     Cystocele, midline     Diabetes mellitus ([facility] [address]     GERD (gastroesophageal reflux disease)     Hidradenitis suppurativa     Hyperlipidemia     Hypertension     SHERRIE on CPAP     CPAP    Stress incontinence      Past Surgical History:   Procedure Laterality Date    BREAST SURGERY PROCEDURE UNLISTED      Right breast benign tumor removal    HX APPENDECTOMY      HX CHOLECYSTECTOMY      HX DILATION AND CURETTAGE      Dysfunctional uterine bleeding, thought 2/2 fibroids    HX TUBAL LIGATION       Barriers to Learning/Limitations: None  Compensate with: visual, verbal, tactile, kinesthetic cues/model    Home Situation:   Home Situation  Home Environment: Private residence  # Steps to Enter: 2  Rails to Enter: Yes  One/Two Story Residence: Two story  # of Interior Steps: 14  Living Alone: No  Support Systems: Child(kim)  Patient Expects to be Discharged to[de-identified] Private residence  Current DME Used/Available at Home: Oxygen, portable; Shower chair; Bedside commode  Tub or Shower Type: Tub/Shower combination  ? Right hand dominant   ? Left hand dominant    Cognitive/Behavioral Status:  Neurologic State: Alert  Orientation Level: Oriented X4  Cognition: Appropriate decision making; Follows commands  Safety/Judgement: Awareness of environment; Fall prevention    Skin: Visible skin appeared intact  Edema: None noted    Vision/Perceptual:    Acuity: Within Defined Limits        Coordination: BUE  Coordination: Within functional limits  Fine Motor Skills-Upper: Left Intact; Right Intact    Gross Motor Skills-Upper: Left Intact; Right Intact    Balance:  Sitting: Intact  Standing: Impaired; Without support  Standing - Static: Good  Standing - Dynamic : Fair    Strength: BUE  Strength: Generally decreased, functional    Tone & Sensation: BUE  Tone: Normal  Sensation: Intact    Range of Motion: BUE  AROM: Within functional limits      Functional Mobility and Transfers for ADLs:  Bed Mobility:  Supine to Sit: Modified independent  Sit to Supine: Modified independent  Transfers:  Sit to Stand: Supervision      ADL Assessment:  Feeding: Independent    Oral Facial Hygiene/Grooming: Independent    Bathing: Supervision    Upper Body Dressing: Independent    Lower Body Dressing: Independent    Toileting: Supervision      ADL Intervention:  Issued pt a handout on energy conservation techniques to improve pt's performance in basic self-care/ADLs. Lower Body Dressing Assistance  Dressing Assistance: Independent    Cognitive Retraining  Safety/Judgement: Awareness of environment; Fall prevention    Pain:  Pain level pre-treatment: 0/10   Pain level post-treatment: 0/10   Pain Intervention(s): Medication (see MAR); Rest, Ice, Repositioning   Response to intervention: Nurse notified, See doc flow    Activity Tolerance:   Good    Please refer to the flowsheet for vital signs taken during this treatment. After treatment:   ?  Patient left in no apparent distress sitting up in chair  ? Patient left in no apparent distress in bed  ? Call bell left within reach  ? Nursing notified  ? Physician present  ? Bed alarm activated    COMMUNICATION/EDUCATION:   ?      Role of Occupational Therapy in the acute care setting  ? Home safety education was provided and the patient/caregiver indicated understanding. ? Patient/family have participated as able and agree with findings and recommendations. ?      Patient is unable to participate in plan of care at this time. Thank you for this referral.  Odalys Tobin, OT  Time Calculation: 23 mins      Eval Complexity: History: MEDIUM Complexity : Expanded review of history including physical, cognitive and psychosocial  history ; Examination: LOW Complexity : 1-3 performance deficits relating to physical, cognitive , or psychosocial skils that result in activity limitations and / or participation restrictions ;    Decision Making:LOW Complexity : No comorbidities that affect functional and no verbal or physical assistance needed to complete eval tasks

## 2019-04-08 NOTE — ROUTINE PROCESS
Bedside and Verbal shift change report given to Nicole Abarca RN   (oncoming nurse) by Tobias Shelby RN (offgoing nurse). Report included the following information SBAR, Kardex, Intake/Output, MAR and Recent Results.

## 2019-04-08 NOTE — PROGRESS NOTES
Problem: Diabetes Self-Management  Goal: *Disease process and treatment process  Description  Define diabetes and identify own type of diabetes; list 3 options for treating diabetes. Outcome: Progressing Towards Goal  Goal: *Incorporating nutritional management into lifestyle  Description  Describe effect of type, amount and timing of food on blood glucose; list 3 methods for planning meals. Outcome: Progressing Towards Goal  Goal: *Incorporating physical activity into lifestyle  Description  State effect of exercise on blood glucose levels. Outcome: Progressing Towards Goal  Goal: *Developing strategies to promote health/change behavior  Description  Define the ABC's of diabetes; identify appropriate screenings, schedule and personal plan for screenings. Outcome: Progressing Towards Goal  Goal: *Using medications safely  Description  State effect of diabetes medications on diabetes; name diabetes medication taking, action and side effects. Outcome: Progressing Towards Goal  Goal: *Monitoring blood glucose, interpreting and using results  Description  Identify recommended blood glucose targets  and personal targets. Outcome: Progressing Towards Goal  Goal: *Prevention, detection, treatment of acute complications  Description  List symptoms of hyper- and hypoglycemia; describe how to treat low blood sugar and actions for lowering  high blood glucose level. Outcome: Progressing Towards Goal  Goal: *Prevention, detection and treatment of chronic complications  Description  Define the natural course of diabetes and describe the relationship of blood glucose levels to long term complications of diabetes.   Outcome: Progressing Towards Goal  Goal: *Developing strategies to address psychosocial issues  Description  Describe feelings about living with diabetes; identify support needed and support network  Outcome: Progressing Towards Goal  Goal: *Sick day guidelines  Outcome: Progressing Towards Goal  Goal: *Patient Specific Goal (EDIT GOAL, INSERT TEXT)  Outcome: Progressing Towards Goal     Problem: Patient Education: Go to Patient Education Activity  Goal: Patient/Family Education  Outcome: Progressing Towards Goal     Problem: Falls - Risk of  Goal: *Absence of Falls  Description  Document Emma [place] Fall Risk and appropriate interventions in the flowsheet.   Outcome: Progressing Towards Goal     Problem: Patient Education: Go to Patient Education Activity  Goal: Patient/Family Education  Outcome: Progressing Towards Goal     Problem: Chronic Obstructive Pulmonary Disease (COPD)  Goal: *Oxygen saturation during activity within specified parameters  Outcome: Progressing Towards Goal  Goal: *Able to remain out of bed as prescribed  Outcome: Progressing Towards Goal  Goal: *Absence of hypoxia  Outcome: Progressing Towards Goal  Goal: *Optimize nutritional status  Outcome: Progressing Towards Goal     Problem: Patient Education: Go to Patient Education Activity  Goal: Patient/Family Education  Outcome: Progressing Towards Goal     Problem: General Medical Care Plan  Goal: *Vital signs within specified parameters  Outcome: Progressing Towards Goal  Goal: *Labs within defined limits  Outcome: Progressing Towards Goal  Goal: *Absence of infection signs and symptoms  Outcome: Progressing Towards Goal  Goal: *Optimal pain control at patient's stated goal  Outcome: Progressing Towards Goal  Goal: *Skin integrity maintained  Outcome: Progressing Towards Goal  Goal: *Fluid volume balance  Outcome: Progressing Towards Goal  Goal: *Optimize nutritional status  Outcome: Progressing Towards Goal  Goal: *Anxiety reduced or absent  Outcome: Progressing Towards Goal  Goal: *Progressive mobility and function (eg: ADL's)  Outcome: Progressing Towards Goal

## 2019-04-08 NOTE — PROGRESS NOTES
Problem: Mobility Impaired (Adult and Pediatric)  Goal: *Acute Goals and Plan of Care (Insert Text)  Description  Physical Therapy Goals  Initiated 4/8/2019 and to be accomplished within 7 day(s)  1. Patient will move from supine to sit and sit to supine  in bed with independence. 2.  Patient will transfer from bed to chair and chair to bed with independence using the least restrictive device. 3.  Patient will perform sit to stand with independence. 4.  Patient will ambulate with independence for 150 feet with the least restrictive device. 5.  Patient will ascend/descend 2 stairs with bilateral handrail(s) with supervision/set-up. Outcome: Progressing Towards Goal    PHYSICAL THERAPY EVALUATION    Patient: Namita Pickett (68 y.o. female)  Date: 4/8/2019  Primary Diagnosis: COPD with acute exacerbation (HCC) [J44.1]        Precautions:         PLOF: Pt lives with daughter in a 2 story home with 2 steps to enter, railings on both sets of stairs. Prior to this episode, pt was ambulating independently in home with no AD but supplemental O2    ASSESSMENT :  Based on the objective data described below, the patient presents with impairments in bilateral LE strength, decreased functional activity tolerance, impaired standing balance after hospital admission for COPD exacerbation. Pt was able to perform supine-sit transfer with modified independnce and displayed good sitting balance. Pt performed supine-sit transfer with supervision and was able to ambulate x 40 feet with O2, no Ad, but with wide JULY, mild unsteadiness of gait and decreased step length. Recommend pt use Rw. Pt returned to room and sat with supervision. Pt with c/o mild SOB with exertion, but states she feels much better since her admission on Friday. Patient will benefit from skilled intervention to address the above impairments.   Patient's rehabilitation potential is considered to be Good  Factors which may influence rehabilitation potential include:   ? None noted  ? Mental ability/status  ? Medical condition  ? Home/family situation and support systems  ? Safety awareness  ? Pain tolerance/management  ? Other:      PLAN :  Recommendations and Planned Interventions:   ?           Bed Mobility Training             ? Neuromuscular Re-Education  ? Transfer Training                   ? Orthotic/Prosthetic Training  ? Gait Training                          ? Modalities  ? Therapeutic Exercises           ? Edema Management/Control  ? Therapeutic Activities            ? Family Training/Education  ? Patient Education  ? Other (comment):    Frequency/Duration: Patient will be followed by physical therapy 1-2 times per day to address goals. Discharge Recommendations: None  Further Equipment Recommendations for Discharge: N/A     SUBJECTIVE:   Patient stated ? I feel a whole lot better. ?    OBJECTIVE DATA SUMMARY:     Past Medical History:   Diagnosis Date    Asthma     Chronic lung disease     COPD     Cystocele, midline     Diabetes mellitus (HCC)     GERD (gastroesophageal reflux disease)     Hidradenitis suppurativa     Hyperlipidemia     Hypertension     SHERRIE on CPAP     CPAP    Stress incontinence      Past Surgical History:   Procedure Laterality Date    BREAST SURGERY PROCEDURE UNLISTED      Right breast benign tumor removal    HX APPENDECTOMY      HX CHOLECYSTECTOMY      HX DILATION AND CURETTAGE      Dysfunctional uterine bleeding, thought 2/2 fibroids    HX TUBAL LIGATION       Barriers to Learning/Limitations: None  Compensate with: N/A  Home Situation:  Home Situation  Home Environment: Private residence  # Steps to Enter: 2  Rails to Enter: Yes  One/Two Story Residence: Two story  # of Interior Steps: 14  Living Alone: No  Support Systems: Child(kim)  Patient Expects to be Discharged to[employer] residence  Current DME Used/Available at Home: Oxygen, portable  Critical Behavior:  Neurologic State: Alert  Orientation Level: Oriented X4  Cognition: Appropriate decision making; Appropriate for age attention/concentration; Appropriate safety awareness    Strength:    Strength: Generally decreased, functional(B LEs)  Range Of Motion:  AROM: Within functional limits(B LEs)    Functional Mobility:  Bed Mobility:  Supine to Sit: Modified independent  Sit to Supine: Modified independent  Transfers:  Sit to Stand: Supervision  Stand to Sit: Supervision    Balance:   Sitting: Intact  Standing: Impaired; Without support  Standing - Static: Good  Standing - Dynamic : Fair  Ambulation/Gait Training:  Distance (ft): 40 Feet (ft)  Ambulation - Level of Assistance: Contact guard assistance  Gait Description (WDL): Exceptions to WDL  Gait Abnormalities: Decreased step clearance  Base of Support: Widened    Pain:  Pain level pre-treatment: 0/10   Pain level post-treatment: 0/10   Pain Intervention(s) : Medication (see MAR); Rest, Ice, Repositioning  Response to intervention: Nurse notified, See doc flow    Activity Tolerance:   Fair  Please refer to the flowsheet for vital signs taken during this treatment. After treatment:   ?         Patient left in no apparent distress sitting up in chair  ? Patient left in no apparent distress in bed  ? Call bell left within reach  ? Nursing notified  ? Caregiver present  ? Bed alarm activated  ? SCDs applied    COMMUNICATION/EDUCATION:   ?         Role of Physical Therapy in the acute care setting. ?         Fall prevention education was provided and the patient/caregiver indicated understanding. ? Patient/family have participated as able in goal setting and plan of care. ?         Patient/family agree to work toward stated goals and plan of care.   ?         Patient understands intent and goals of therapy, but is neutral about his/her participation. ? Patient is unable to participate in goal setting/plan of care: ongoing with therapy staff. ?         Other:     Thank you for this referral.  Winsome Ferrara, PT   Time Calculation: 23 mins      Eval Complexity: History: LOW Complexity : Zero comorbidities / personal factors that will impact the outcome / POCExam:LOW Complexity : 1-2 Standardized tests and measures addressing body structure, function, activity limitation and / or participation in recreation  Presentation: LOW Complexity : Stable, uncomplicated  Clinical Decision Making:Low Complexity    Overall Complexity:LOW

## 2019-04-09 ENCOUNTER — HOME HEALTH ADMISSION (OUTPATIENT)
Dept: HOME HEALTH SERVICES | Facility: HOME HEALTH | Age: 59
End: 2019-04-09
Payer: MEDICARE

## 2019-04-09 LAB
ANION GAP SERPL CALC-SCNC: 7 MMOL/L (ref 3–18)
BASOPHILS # BLD: 0 K/UL (ref 0–0.1)
BASOPHILS NFR BLD: 0 % (ref 0–2)
BUN SERPL-MCNC: 16 MG/DL (ref 7–18)
BUN/CREAT SERPL: 19 (ref 12–20)
CALCIUM SERPL-MCNC: 9.4 MG/DL (ref 8.5–10.1)
CHLORIDE SERPL-SCNC: 104 MMOL/L (ref 100–108)
CO2 SERPL-SCNC: 31 MMOL/L (ref 21–32)
CREAT SERPL-MCNC: 0.83 MG/DL (ref 0.6–1.3)
DIFFERENTIAL METHOD BLD: ABNORMAL
EOSINOPHIL # BLD: 0 K/UL (ref 0–0.4)
EOSINOPHIL NFR BLD: 0 % (ref 0–5)
ERYTHROCYTE [DISTWIDTH] IN BLOOD BY AUTOMATED COUNT: 13.2 % (ref 11.6–14.5)
GLUCOSE BLD STRIP.AUTO-MCNC: 119 MG/DL (ref 70–110)
GLUCOSE BLD STRIP.AUTO-MCNC: 237 MG/DL (ref 70–110)
GLUCOSE BLD STRIP.AUTO-MCNC: 260 MG/DL (ref 70–110)
GLUCOSE BLD STRIP.AUTO-MCNC: 290 MG/DL (ref 70–110)
GLUCOSE SERPL-MCNC: 117 MG/DL (ref 74–99)
HCT VFR BLD AUTO: 36 % (ref 35–45)
HGB BLD-MCNC: 12.3 G/DL (ref 12–16)
LACTATE SERPL-SCNC: 1.7 MMOL/L (ref 0.4–2)
LYMPHOCYTES # BLD: 2.7 K/UL (ref 0.9–3.6)
LYMPHOCYTES NFR BLD: 32 % (ref 21–52)
MCH RBC QN AUTO: 29.7 PG (ref 24–34)
MCHC RBC AUTO-ENTMCNC: 34.2 G/DL (ref 31–37)
MCV RBC AUTO: 87 FL (ref 74–97)
MONOCYTES # BLD: 0.5 K/UL (ref 0.05–1.2)
MONOCYTES NFR BLD: 6 % (ref 3–10)
NEUTS SEG # BLD: 5 K/UL (ref 1.8–8)
NEUTS SEG NFR BLD: 62 % (ref 40–73)
PLATELET # BLD AUTO: 273 K/UL (ref 135–420)
PMV BLD AUTO: 9.2 FL (ref 9.2–11.8)
POTASSIUM SERPL-SCNC: 3.4 MMOL/L (ref 3.5–5.5)
RBC # BLD AUTO: 4.14 M/UL (ref 4.2–5.3)
SODIUM SERPL-SCNC: 142 MMOL/L (ref 136–145)
WBC # BLD AUTO: 8.2 K/UL (ref 4.6–13.2)

## 2019-04-09 PROCEDURE — 82962 GLUCOSE BLOOD TEST: CPT

## 2019-04-09 PROCEDURE — 74011636637 HC RX REV CODE- 636/637: Performed by: STUDENT IN AN ORGANIZED HEALTH CARE EDUCATION/TRAINING PROGRAM

## 2019-04-09 PROCEDURE — 97116 GAIT TRAINING THERAPY: CPT

## 2019-04-09 PROCEDURE — 74011250636 HC RX REV CODE- 250/636: Performed by: STUDENT IN AN ORGANIZED HEALTH CARE EDUCATION/TRAINING PROGRAM

## 2019-04-09 PROCEDURE — 36415 COLL VENOUS BLD VENIPUNCTURE: CPT

## 2019-04-09 PROCEDURE — 85025 COMPLETE CBC W/AUTO DIFF WBC: CPT

## 2019-04-09 PROCEDURE — 74011636637 HC RX REV CODE- 636/637: Performed by: FAMILY MEDICINE

## 2019-04-09 PROCEDURE — 74011250637 HC RX REV CODE- 250/637: Performed by: STUDENT IN AN ORGANIZED HEALTH CARE EDUCATION/TRAINING PROGRAM

## 2019-04-09 PROCEDURE — 65270000029 HC RM PRIVATE

## 2019-04-09 PROCEDURE — 94640 AIRWAY INHALATION TREATMENT: CPT

## 2019-04-09 PROCEDURE — 83605 ASSAY OF LACTIC ACID: CPT

## 2019-04-09 PROCEDURE — 94760 N-INVAS EAR/PLS OXIMETRY 1: CPT

## 2019-04-09 PROCEDURE — 80048 BASIC METABOLIC PNL TOTAL CA: CPT

## 2019-04-09 PROCEDURE — 74011250637 HC RX REV CODE- 250/637: Performed by: FAMILY MEDICINE

## 2019-04-09 PROCEDURE — 74011000258 HC RX REV CODE- 258: Performed by: STUDENT IN AN ORGANIZED HEALTH CARE EDUCATION/TRAINING PROGRAM

## 2019-04-09 RX ORDER — PREDNISONE 20 MG/1
20 TABLET ORAL
Qty: 17 TAB | Refills: 0 | Status: SHIPPED | OUTPATIENT
Start: 2019-04-09 | End: 2019-05-09

## 2019-04-09 RX ORDER — POTASSIUM CHLORIDE 20 MEQ/1
20 TABLET, EXTENDED RELEASE ORAL
Status: COMPLETED | OUTPATIENT
Start: 2019-04-09 | End: 2019-04-09

## 2019-04-09 RX ORDER — LEVOFLOXACIN 750 MG/1
750 TABLET ORAL EVERY 24 HOURS
Qty: 2 TAB | Refills: 0 | Status: SHIPPED | OUTPATIENT
Start: 2019-04-09 | End: 2019-05-27

## 2019-04-09 RX ADMIN — INSULIN LISPRO 9 UNITS: 100 INJECTION, SOLUTION INTRAVENOUS; SUBCUTANEOUS at 17:40

## 2019-04-09 RX ADMIN — POTASSIUM CHLORIDE 20 MEQ: 20 TABLET, EXTENDED RELEASE ORAL at 09:07

## 2019-04-09 RX ADMIN — INSULIN GLARGINE 30 UNITS: 100 INJECTION, SOLUTION SUBCUTANEOUS at 21:35

## 2019-04-09 RX ADMIN — BUDESONIDE AND FORMOTEROL FUMARATE DIHYDRATE 2 PUFF: 160; 4.5 AEROSOL RESPIRATORY (INHALATION) at 20:27

## 2019-04-09 RX ADMIN — SODIUM CHLORIDE 150 ML/HR: 450 INJECTION, SOLUTION INTRAVENOUS at 06:28

## 2019-04-09 RX ADMIN — HEPARIN SODIUM 5000 UNITS: 5000 INJECTION INTRAVENOUS; SUBCUTANEOUS at 21:35

## 2019-04-09 RX ADMIN — ASPIRIN 81 MG: 81 TABLET, COATED ORAL at 09:08

## 2019-04-09 RX ADMIN — PREDNISONE 60 MG: 20 TABLET ORAL at 09:07

## 2019-04-09 RX ADMIN — BUDESONIDE AND FORMOTEROL FUMARATE DIHYDRATE 2 PUFF: 160; 4.5 AEROSOL RESPIRATORY (INHALATION) at 09:01

## 2019-04-09 RX ADMIN — MONTELUKAST SODIUM 10 MG: 10 TABLET, FILM COATED ORAL at 21:35

## 2019-04-09 RX ADMIN — INSULIN LISPRO 6 UNITS: 100 INJECTION, SOLUTION INTRAVENOUS; SUBCUTANEOUS at 11:52

## 2019-04-09 RX ADMIN — LISINOPRIL 20 MG: 20 TABLET ORAL at 09:08

## 2019-04-09 RX ADMIN — PANTOPRAZOLE SODIUM 40 MG: 40 TABLET, DELAYED RELEASE ORAL at 09:08

## 2019-04-09 RX ADMIN — POTASSIUM CHLORIDE 20 MEQ: 20 TABLET, EXTENDED RELEASE ORAL at 11:52

## 2019-04-09 RX ADMIN — LEVOFLOXACIN 750 MG: 750 TABLET, FILM COATED ORAL at 21:35

## 2019-04-09 RX ADMIN — POTASSIUM CHLORIDE 20 MEQ: 20 TABLET, EXTENDED RELEASE ORAL at 06:29

## 2019-04-09 RX ADMIN — HYDROCHLOROTHIAZIDE 12.5 MG: 25 TABLET ORAL at 09:07

## 2019-04-09 RX ADMIN — LISINOPRIL 20 MG: 20 TABLET ORAL at 21:34

## 2019-04-09 RX ADMIN — INSULIN LISPRO 9 UNITS: 100 INJECTION, SOLUTION INTRAVENOUS; SUBCUTANEOUS at 21:35

## 2019-04-09 RX ADMIN — TIOTROPIUM BROMIDE 18 MCG: 18 CAPSULE ORAL; RESPIRATORY (INHALATION) at 09:01

## 2019-04-09 RX ADMIN — FLUTICASONE PROPIONATE 2 SPRAY: 50 SPRAY, METERED NASAL at 09:06

## 2019-04-09 RX ADMIN — HEPARIN SODIUM 5000 UNITS: 5000 INJECTION INTRAVENOUS; SUBCUTANEOUS at 06:28

## 2019-04-09 RX ADMIN — HEPARIN SODIUM 5000 UNITS: 5000 INJECTION INTRAVENOUS; SUBCUTANEOUS at 17:40

## 2019-04-09 NOTE — ROUTINE PROCESS
Bedside and Verbal shift change report given to Wendie Nolasco RN   (oncoming nurse) by Holly Garcia RN (offgoing nurse). Report included the following information SBAR, Kardex, Intake/Output, MAR and Recent Results.

## 2019-04-09 NOTE — PROGRESS NOTES
Problem: Mobility Impaired (Adult and Pediatric)  Goal: *Acute Goals and Plan of Care (Insert Text)  Description  Physical Therapy Goals  Initiated 4/8/2019 and to be accomplished within 7 day(s)  1. Patient will move from supine to sit and sit to supine  in bed with independence. 2.  Patient will transfer from bed to chair and chair to bed with independence using the least restrictive device. 3.  Patient will perform sit to stand with independence. 4.  Patient will ambulate with independence for 150 feet with the least restrictive device. 5.  Patient will ascend/descend 2 stairs with bilateral handrail(s) with supervision/set-up. Outcome: Progressing Towards Goal   PHYSICAL THERAPY TREATMENT    Patient: Eufemia Ashby (89 y.o. female)  Date: 4/9/2019  Diagnosis: COPD with acute exacerbation ([address] [J44.1] COPD with acute exacerbation ([facility] [address]       Precautions:    ASSESSMENT:  Nursing cleared pt to participate with PT. Pt found seated in recliner chair on 3L of wall O2 upon arrival and willing to work with PT. Pt was put on portable O2 and performed standing marches with RW SBA x10 bilaterally. Pt fatigues quickly and required a seated rest break prior to gait training. Pt then ambulated into hallway with RW SBA 110ft total. Pt demonstrated a very slow zee and required 3 standing rest breaks. Pt returned to recliner chair in room and was put back on wall O2. Pt was positioned to comfort in recliner chair and was left seated with all needs in reach and nursing notified. Progression toward goals:   ?      Improving appropriately and progressing toward goals  ? Improving slowly and progressing toward goals  ? Not making progress toward goals and plan of care will be adjusted     PLAN:  Patient continues to benefit from skilled intervention to address the above impairments. Continue treatment per established plan of care.   Discharge Recommendations:  Home Health PT  Further Equipment Recommendations for Discharge:  shower bench     SUBJECTIVE:   Patient stated ? I just have to take it a few steps at a time and take a rest.? (referring to getting up 14 steps to 2nd floor of house)    OBJECTIVE DATA SUMMARY:   Critical Behavior:  Neurologic State: Alert  Orientation Level: Oriented X4  Cognition: Follows commands  Safety/Judgement: Awareness of environment, Fall prevention  Functional Mobility Training:  Transfers:  Sit to Stand: Supervision  Stand to Sit: Supervision  Balance:  Sitting: Intact  Standing: Impaired; With support  Standing - Static: Good  Standing - Dynamic : Fair(Fair+)  Ambulation/Gait Training:  Distance (ft): 110 Feet (ft)  Assistive Device: Walker, rolling(portable O2)  Ambulation - Level of Assistance: Stand-by assistance  Gait Abnormalities: Decreased step clearance  Base of Support: Center of gravity altered  Speed/Pam: Pace decreased (<100 feet/min); Slow  Step Length: Right shortened;Left shortened  Interventions: Verbal cues  Therapeutic Exercises:     EXERCISE   Sets   Reps   Active Active Assist   Passive Self ROM   Comments   Ankle Pumps    ? ? ? ?    Quad Sets/Glut Sets    ? ? ? ? Hold for 5 secs   Hamstring Sets   ? ? ? ? Short Arc Quads   ? ? ? ? Heel Slides   ? ? ? ? Straight Leg Raises   ? ? ? ? Hip Add   ? ? ? ? Hold for 5 secs, w/ pillow squeeze   Long Arc Quads   ? ? ? ? Seated Marching   ? ? ? ? Standing Marching 1 10 ? ? ? ?       ? ? ? ? Pain:  Pain level pre-treatment: 0/10  Pain level post-treatment: 0/10   Activity Tolerance:   fair  Please refer to the flowsheet for vital signs taken during this treatment. After treatment:   ? Patient left in no apparent distress sitting up in chair  ? Patient left in no apparent distress in bed  ? Call bell left within reach  ? Nursing notified  ? Caregiver present  ? Bed alarm activated  ?  SCDs applied      COMMUNICATION/EDUCATION:   ?         Role of Physical Therapy in the acute care setting. ?         Fall prevention education was provided and the patient/caregiver indicated understanding. ? Patient/family have participated as able in working toward goals and plan of care. ?         Patient/family agree to work toward stated goals and plan of care. ?         Patient understands intent and goals of therapy, but is neutral about his/her participation. ? Patient is unable to participate in stated goals/plan of care: ongoing with therapy staff.   ?         Other:        Frankie Salinas, KYLE   Time Calculation: 24 mins

## 2019-04-09 NOTE — PROGRESS NOTES
Intern Progress Note  [facility]       Patient: Brianne Storm MRN: [id number]  CSN: [id number]    YOB: 1960  Age: 60 y.o. Sex: female    DOA: 4/5/2019 LOS:  LOS: 4 days                    Subjective:     Acute events: VICKY overnight. Patient use home CPAP overnight. Patient reports improvement in respiratory status and fatigue. Review of Systems   Constitutional: Negative for chills and fever. Respiratory: Positive for cough and shortness of breath. Cardiovascular: Negative for chest pain and palpitations. Gastrointestinal: Negative for nausea and vomiting. Objective:      Patient Vitals for the past 24 hrs:   Temp Pulse Resp BP SpO2   04/09/19 0817 97.4 °F (36.3 °C) 60 18 141/83 96 %   04/09/19 0421 97.7 °F (36.5 °C) 67 18 133/79 97 %   04/09/19 0016 97.6 °F (36.4 °C) 75 18 146/72 97 %   04/08/19 2147 -- -- -- -- 99 %   04/08/19 2141 -- -- -- -- 99 %   04/08/19 1937 97.3 °F (36.3 °C) 76 18 115/54 96 %   04/08/19 1659 98.9 °F (37.2 °C) 69 18 120/69 98 %   04/08/19 1131 97.5 °F (36.4 °C) 69 18 148/81 97 %         Intake/Output Summary (Last 24 hours) at 4/9/2019 0901  Last data filed at 4/9/2019 2598  Gross per 24 hour   Intake 1817 ml   Output 3100 ml   Net -1283 ml     Physical Exam:  General:  Alert and Responsive and in No acute distress. HEENT: Conjunctiva pink, sclera anicteric. EOMI.  Pharynx moist, nonerythematous.  Moist mucous membranes.    CV:  RRR, no murmurs. No visible pulsations or thrills.    RESP:  Unlabored breathing. CTAB. Equal expansion bilaterally. ABD:  Soft, nontender, nondistended.    MS:  No joint deformity or instability.  No atrophy. Neuro:  5/5 strength bilateral upper extremities and lower extremities.  A+Ox3. Ext:  Trace edema.  2+ radial and dp pulses bilaterally. Skin:  No rashes, lesions, or ulcers.  Good turgor.       Lab/Data Reviewed:  Recent Results (from the past 12 hour(s))   LACTIC ACID    Collection Time: 04/08/19  9:37 PM   Result Value Ref Range    Lactic acid 2.1 (HH) 0.4 - 2.0 MMOL/L   GLUCOSE, POC    Collection Time: 04/08/19  9:40 PM   Result Value Ref Range    Glucose (POC) 245 (H) 70 - 063 mg/dL   METABOLIC PANEL, BASIC    Collection Time: 04/09/19  3:39 AM   Result Value Ref Range    Sodium 142 136 - 145 mmol/L    Potassium 3.4 (L) 3.5 - 5.5 mmol/L    Chloride 104 100 - 108 mmol/L    CO2 31 21 - 32 mmol/L    Anion gap 7 3.0 - 18 mmol/L    Glucose 117 (H) 74 - 99 mg/dL    BUN 16 7.0 - 18 MG/DL    Creatinine 0.83 0.6 - 1.3 MG/DL    BUN/Creatinine ratio 19 12 - 20      GFR est AA >60 >60 ml/min/1.73m2    GFR est non-AA >60 >60 ml/min/1.73m2    Calcium 9.4 8.5 - 10.1 MG/DL   CBC WITH AUTOMATED DIFF    Collection Time: 04/09/19  3:39 AM   Result Value Ref Range    WBC 8.2 4.6 - 13.2 K/uL    RBC 4.14 (L) 4.20 - 5.30 M/uL    HGB 12.3 12.0 - 16.0 g/dL    HCT 36.0 35.0 - 45.0 %    MCV 87.0 74.0 - 97.0 FL    MCH 29.7 24.0 - 34.0 PG    MCHC 34.2 31.0 - 37.0 g/dL    RDW 13.2 11.6 - 14.5 %    PLATELET 063 259 - 794 K/uL    MPV 9.2 9.2 - 11.8 FL    NEUTROPHILS 62 40 - 73 %    LYMPHOCYTES 32 21 - 52 %    MONOCYTES 6 3 - 10 %    EOSINOPHILS 0 0 - 5 %    BASOPHILS 0 0 - 2 %    ABS. NEUTROPHILS 5.0 1.8 - 8.0 K/UL    ABS. LYMPHOCYTES 2.7 0.9 - 3.6 K/UL    ABS. MONOCYTES 0.5 0.05 - 1.2 K/UL    ABS. EOSINOPHILS 0.0 0.0 - 0.4 K/UL    ABS.  BASOPHILS 0.0 0.0 - 0.1 K/UL    DF AUTOMATED     LACTIC ACID    Collection Time: 04/09/19  3:39 AM   Result Value Ref Range    Lactic acid 1.7 0.4 - 2.0 MMOL/L   GLUCOSE, POC    Collection Time: 04/09/19  8:23 AM   Result Value Ref Range    Glucose (POC) 119 (H) 70 - 110 mg/dL         Scheduled Medications Reviewed:  Current Facility-Administered Medications   Medication Dose Route Frequency    potassium chloride (K-DUR, KLOR-CON) SR tablet 20 mEq  20 mEq Oral Q2H    tiotropium (SPIRIVA) inhalation capsule 18 mcg  1 Cap Inhalation DAILY    budesonide-formoterol (SYMBICORT) 160-4.5 mcg/actuation HFA inhaler 2 Puff  2 Puff Inhalation BID RT    lisinopril (PRINIVIL, ZESTRIL) tablet 20 mg  20 mg Oral BID    levoFLOXacin (LEVAQUIN) tablet 750 mg  750 mg Oral Q24H    predniSONE (DELTASONE) tablet 60 mg  60 mg Oral DAILY WITH BREAKFAST    0.45% sodium chloride infusion  150 mL/hr IntraVENous CONTINUOUS    heparin (porcine) injection 5,000 Units  5,000 Units SubCUTAneous Q8H    aspirin delayed-release tablet 81 mg  81 mg Oral DAILY    fluticasone propionate (FLONASE) 50 mcg/actuation nasal spray 2 Spray  2 Spray Both Nostrils DAILY    hydroCHLOROthiazide (HYDRODIURIL) tablet 12.5 mg  12.5 mg Oral DAILY    montelukast (SINGULAIR) tablet 10 mg  10 mg Oral QHS    pantoprazole (PROTONIX) tablet 40 mg  40 mg Oral ACB    insulin lispro (HUMALOG) injection   SubCUTAneous AC&HS    insulin glargine (LANTUS) injection 30 Units  30 Units SubCUTAneous QHS         Imaging, microbiology, and EKG/Telemetry:  No results found. All Micro Results     Procedure Component Value Units Date/Time    CULTURE, BLOOD [[id number]] Collected:  04/05/19 1930    Order Status:  Completed Specimen:  Blood Updated:  04/09/19 0628     Special Requests: NO SPECIAL REQUESTS        Culture result: NO GROWTH 4 DAYS       CULTURE, BLOOD [[id number]] Collected:  04/05/19 1915    Order Status:  Completed Specimen:  Blood Updated:  04/09/19 7769     Special Requests: NO SPECIAL REQUESTS        Culture result: NO GROWTH 4 DAYS               Assessment/Plan   60 y.o. female with PMH COPD, diastolic CHF, DMT2, HTN, GERD, allergic rhinitis and SHERRIE, now admitted with COPD exacerbation.     COPD exacerbation with lactic acidosis, SIRS 2/4  On presentation, patient was tachycardic and tachypneic with shortness of breath on 6L oxygen NC. Patient had elevated lactic 2.72 concerning for possible CAP. SIRS 2/4 (RR 28 and ).  Patient recurrently admitted for COPD exacerbations.   - CBC, BMP, Mg daily   - Normosol @ 125 cc/hr   - Duonebs prn  - Levaquin 750 mg PO daily  - Prednisone 60 mg  - Continue supplemental oxygen  - CPAP overnight  - Continue singular 10 mg every day  - Hold advair 2 puffs BID daily  - Hold spiriva 1.25 mcg/act 2 puffs daily  - symbicort 160-4.5 mcg/act 2 puffs BID  - spiriva 18 mcg daily  - Tylenol prn  - PT/OT/CM  - consider respiratory culture      Diastolic CHF with preserved EF, HTN. Stable on exam. ECHO 7/2018 EF 66-70% with no wma and LVH. - continue aspirin 81 mg po daily  - Vital signs per unit routine  - Continue lisinopril 40 mg po daily  - Continue hctz 12.5 mg po daily     DMT2 BG on admission 264. Last HgbA1c 8 from 1/18/2019.   - Accuchecks achs  - SSI  - Continue home Lantus 30U qhs  - Consistent carbohydrate diet  - Hemoglobin A1c 8 (4/5)     H/O SHERRIE  - CPAP qhs     GERD, Allergic rhinitis  - Hold home omeprazole 40 mg po daily   - Continue protonix 40 mg po daily  - Continue home famotidine 20 mg po bid prn   - Continue home simethicone prn   - Continue flonase     Diet: Diabetic  DVT Prophylaxis: [facility]  Code Status: Full  Point of Contact: Caryn Relationship: Daughter [phone]     Disposition and anticipated LOS: 3 midnights    MD Kelli Phillips PGY-1  04/09/19 9:12 AM  Pager: [phone]

## 2019-04-10 VITALS
SYSTOLIC BLOOD PRESSURE: 121 MMHG | RESPIRATION RATE: 18 BRPM | BODY MASS INDEX: 43.55 KG/M2 | WEIGHT: 245.81 LBS | OXYGEN SATURATION: 94 % | HEART RATE: 65 BPM | HEIGHT: 63 IN | DIASTOLIC BLOOD PRESSURE: 73 MMHG | TEMPERATURE: 96.5 F

## 2019-04-10 LAB
ANION GAP SERPL CALC-SCNC: 8 MMOL/L (ref 3–18)
BASOPHILS # BLD: 0 K/UL (ref 0–0.1)
BASOPHILS NFR BLD: 0 % (ref 0–2)
BUN SERPL-MCNC: 16 MG/DL (ref 7–18)
BUN/CREAT SERPL: 15 (ref 12–20)
CALCIUM SERPL-MCNC: 9.8 MG/DL (ref 8.5–10.1)
CHLORIDE SERPL-SCNC: 100 MMOL/L (ref 100–108)
CO2 SERPL-SCNC: 31 MMOL/L (ref 21–32)
CREAT SERPL-MCNC: 1.06 MG/DL (ref 0.6–1.3)
DIFFERENTIAL METHOD BLD: NORMAL
EOSINOPHIL # BLD: 0 K/UL (ref 0–0.4)
EOSINOPHIL NFR BLD: 0 % (ref 0–5)
ERYTHROCYTE [DISTWIDTH] IN BLOOD BY AUTOMATED COUNT: 13.2 % (ref 11.6–14.5)
GLUCOSE BLD STRIP.AUTO-MCNC: 118 MG/DL (ref 70–110)
GLUCOSE BLD STRIP.AUTO-MCNC: 288 MG/DL (ref 70–110)
GLUCOSE SERPL-MCNC: 135 MG/DL (ref 74–99)
HCT VFR BLD AUTO: 36.9 % (ref 35–45)
HGB BLD-MCNC: 12.8 G/DL (ref 12–16)
LYMPHOCYTES # BLD: 3 K/UL (ref 0.9–3.6)
LYMPHOCYTES NFR BLD: 35 % (ref 21–52)
MCH RBC QN AUTO: 30.1 PG (ref 24–34)
MCHC RBC AUTO-ENTMCNC: 34.7 G/DL (ref 31–37)
MCV RBC AUTO: 86.8 FL (ref 74–97)
MONOCYTES # BLD: 0.5 K/UL (ref 0.05–1.2)
MONOCYTES NFR BLD: 6 % (ref 3–10)
NEUTS SEG # BLD: 5.1 K/UL (ref 1.8–8)
NEUTS SEG NFR BLD: 59 % (ref 40–73)
PLATELET # BLD AUTO: 273 K/UL (ref 135–420)
PMV BLD AUTO: 9.2 FL (ref 9.2–11.8)
POTASSIUM SERPL-SCNC: 3.6 MMOL/L (ref 3.5–5.5)
RBC # BLD AUTO: 4.25 M/UL (ref 4.2–5.3)
SODIUM SERPL-SCNC: 139 MMOL/L (ref 136–145)
WBC # BLD AUTO: 8.6 K/UL (ref 4.6–13.2)

## 2019-04-10 PROCEDURE — 94640 AIRWAY INHALATION TREATMENT: CPT

## 2019-04-10 PROCEDURE — 74011636637 HC RX REV CODE- 636/637: Performed by: STUDENT IN AN ORGANIZED HEALTH CARE EDUCATION/TRAINING PROGRAM

## 2019-04-10 PROCEDURE — 36415 COLL VENOUS BLD VENIPUNCTURE: CPT

## 2019-04-10 PROCEDURE — 80048 BASIC METABOLIC PNL TOTAL CA: CPT

## 2019-04-10 PROCEDURE — 74011250636 HC RX REV CODE- 250/636: Performed by: STUDENT IN AN ORGANIZED HEALTH CARE EDUCATION/TRAINING PROGRAM

## 2019-04-10 PROCEDURE — 74011250637 HC RX REV CODE- 250/637: Performed by: FAMILY MEDICINE

## 2019-04-10 PROCEDURE — 74011636637 HC RX REV CODE- 636/637: Performed by: FAMILY MEDICINE

## 2019-04-10 PROCEDURE — 97116 GAIT TRAINING THERAPY: CPT

## 2019-04-10 PROCEDURE — 85025 COMPLETE CBC W/AUTO DIFF WBC: CPT

## 2019-04-10 PROCEDURE — 82962 GLUCOSE BLOOD TEST: CPT

## 2019-04-10 PROCEDURE — 74011250637 HC RX REV CODE- 250/637: Performed by: STUDENT IN AN ORGANIZED HEALTH CARE EDUCATION/TRAINING PROGRAM

## 2019-04-10 RX ADMIN — BUDESONIDE AND FORMOTEROL FUMARATE DIHYDRATE 2 PUFF: 160; 4.5 AEROSOL RESPIRATORY (INHALATION) at 08:54

## 2019-04-10 RX ADMIN — INSULIN LISPRO 9 UNITS: 100 INJECTION, SOLUTION INTRAVENOUS; SUBCUTANEOUS at 12:40

## 2019-04-10 RX ADMIN — TIOTROPIUM BROMIDE 18 MCG: 18 CAPSULE ORAL; RESPIRATORY (INHALATION) at 08:54

## 2019-04-10 RX ADMIN — PREDNISONE 60 MG: 20 TABLET ORAL at 09:13

## 2019-04-10 RX ADMIN — ASPIRIN 81 MG: 81 TABLET, COATED ORAL at 09:14

## 2019-04-10 RX ADMIN — HYDROCHLOROTHIAZIDE 12.5 MG: 25 TABLET ORAL at 09:13

## 2019-04-10 RX ADMIN — LISINOPRIL 20 MG: 20 TABLET ORAL at 09:13

## 2019-04-10 RX ADMIN — FLUTICASONE PROPIONATE 2 SPRAY: 50 SPRAY, METERED NASAL at 09:15

## 2019-04-10 RX ADMIN — HEPARIN SODIUM 5000 UNITS: 5000 INJECTION INTRAVENOUS; SUBCUTANEOUS at 06:34

## 2019-04-10 RX ADMIN — PANTOPRAZOLE SODIUM 40 MG: 40 TABLET, DELAYED RELEASE ORAL at 09:13

## 2019-04-10 NOTE — PROGRESS NOTES
Bedside and Verbal shift change report given to this RN (oncoming nurse) by Rohini Wang RN (offgoing nurse). Report included the following information SBAR, Kardex and Cardiac Rhythm NSR   -    0919-At room air patient at 94%. Patient states that she has Oxygen tank at home and usually uses a 2L flow rate. She is requesting a nebulizer to go home with as well. Communicated to DR. Tracey. 1500--Reviewed discharge paperwork with patient. Removed all lines, answered all questions, and removed armbands. Pt stable and in no distress      Also, patient verbally stated it was okay to give information to her independent living agency that assists her in the community. Information given with pt verbal consent. Pt awaiting sister to pick her up    [phone]- Patient transport accompanied pt downstairs to main lobby for discharge.

## 2019-04-10 NOTE — HOME CARE
Discharge noted for today. Home health referral for [facility] for SN, PT, and OT - telehealth monitoring. Order processed and called to [facility] in intake.   Lien Gordillo LPN Alert/Awake/Cooperative

## 2019-04-10 NOTE — PROGRESS NOTES
Discharge:    Discharge order noted for today. Pt has been accepted to [facility] agency. Met with patient and she is agreeable to the transition plan today. Transport has been arranged through her family. Patient's discharge summary and home health  orders have been forwarded to [facility] home health  agency via [facility] Updated bedside RN, Yary Ovalle,  to the transition plan. Discharge information has been documented on the AVS.       Chuy Marlow MSW  Care Manager  Pager#: [phone]

## 2019-04-10 NOTE — PROGRESS NOTES
Problem: Falls - Risk of  Goal: *Absence of Falls  Description  Document Popeye Castro Fall Risk and appropriate interventions in the flowsheet.   Outcome: Progressing Towards Goal     Problem: Chronic Obstructive Pulmonary Disease (COPD)  Goal: *Oxygen saturation during activity within specified parameters  Outcome: Progressing Towards Goal     Problem: Pain  Goal: *Control of Pain  Outcome: Progressing Towards Goal

## 2019-04-10 NOTE — ROUTINE PROCESS
1925 Assumed care of patient from off going nurse. Patient resting in bed. No distress noted. Call bell within reach, siderails up x 3, bed in lowest position, and patient instructed to use call bell for assistance. Will continue to monitor. 2190 Bedside and Verbal shift change report given to [address] (oncoming nurse) by Amauri Middleton RN(offgoing nurse). Report included the following information Kardex, Intake/Output, MAR, Recent Results and Cardiac Rhythm SR tele box# [id number].

## 2019-04-10 NOTE — PROGRESS NOTES
Bedside and Verbal shift change report given to [facility] (oncoming nurse) by this RN (offgoing nurse). Report included the following information SBAR and Kardex.

## 2019-04-10 NOTE — DISCHARGE INSTRUCTIONS
Patient Education        Chronic Obstructive Pulmonary Disease (COPD): Care Instructions  Your Care Instructions    Chronic obstructive pulmonary disease (COPD) is a general term for a group of lung diseases, including emphysema and chronic bronchitis. People with COPD have decreased airflow in and out of the lungs, which makes it hard to breathe. The airways also can get clogged with thick mucus. Cigarette smoking is a major cause of COPD. Although there is no cure for COPD, you can slow its progress. Following your treatment plan and taking care of yourself can help you feel better and live longer. Follow-up care is a key part of your treatment and safety. Be sure to make and go to all appointments, and call your doctor if you are having problems. It's also a good idea to know your test results and keep a list of the medicines you take. How can you care for yourself at home?   Staying healthy    · Do not smoke. This is the most important step you can take to prevent more damage to your lungs. If you need help quitting, talk to your doctor about stop-smoking programs and medicines. These can increase your chances of quitting for good.     · Avoid colds and flu. Get a pneumococcal vaccine shot. If you have had one before, ask your doctor whether you need a second dose. Get the flu vaccine every fall. If you must be around people with colds or the flu, wash your hands often.     · Avoid secondhand smoke, air pollution, and high altitudes. Also avoid cold, dry air and hot, humid air. Stay at home with your windows closed when air pollution is bad.    Medicines and oxygen therapy    · Take your medicines exactly as prescribed. Call your doctor if you think you are having a problem with your medicine.     · You may be taking medicines such as:  ? Bronchodilators. These help open your airways and make breathing easier. Bronchodilators are either short-acting (work for 6 to 9 hours) or long-acting (work for 24 hours). You inhale most bronchodilators, so they start to act quickly. Always carry your quick-relief inhaler with you in case you need it while you are away from home. ? Corticosteroids (prednisone, budesonide). These reduce airway inflammation. They come in pill or inhaled form. You must take these medicines every day for them to work well.     · A spacer may help you get more inhaled medicine to your lungs. Ask your doctor or pharmacist if a spacer is right for you. If it is, ask how to use it properly.     · Do not take any vitamins, over-the-counter medicine, or herbal products without talking to your doctor first.     · If your doctor prescribed antibiotics, take them as directed. Do not stop taking them just because you feel better. You need to take the full course of antibiotics.     · Oxygen therapy boosts the amount of oxygen in your blood and helps you breathe easier. Use the flow rate your doctor has recommended, and do not change it without talking to your doctor first.   Activity    · Get regular exercise. Walking is an easy way to get exercise. Start out slowly, and walk a little more each day.     · Pay attention to your breathing. You are exercising too hard if you cannot talk while you are exercising.     · Take short rest breaks when doing household chores and other activities.     · Learn breathing methods--such as breathing through pursed lips--to help you become less short of breath.     · If your doctor has not set you up with a pulmonary rehabilitation program, talk to him or her about whether rehab is right for you. Rehab includes exercise programs, education about your disease and how to manage it, help with diet and other changes, and emotional support. Diet    · Eat regular, healthy meals. Use bronchodilators about 1 hour before you eat to make it easier to eat. Eat several small meals instead of three large ones.  Drink beverages at the end of the meal. Avoid foods that are hard to chew.     · Eat foods that contain protein so that you do not lose muscle mass.     · Talk with your doctor if you gain too much weight or if you lose weight without trying.    Mental health    · Talk to your family, friends, or a therapist about your feelings. It is normal to feel frightened, angry, hopeless, helpless, and even guilty. Talking openly about bad feelings can help you cope. If these feelings last, talk to your doctor. When should you call for help? Call 911 anytime you think you may need emergency care. For example, call if:    · You have severe trouble breathing.    Call your doctor now or seek immediate medical care if:    · You have new or worse trouble breathing.     · You cough up blood.     · You have a fever.    Watch closely for changes in your health, and be sure to contact your doctor if:    · You cough more deeply or more often, especially if you notice more mucus or a change in the color of your mucus.     · You have new or worse swelling in your legs or belly.     · You are not getting better as expected. Where can you learn more? Go to http://etelvina-olivier.info/. Maday Santiago in the search box to learn more about \"Chronic Obstructive Pulmonary Disease (COPD): Care Instructions. \"  Current as of: September 5, 2018  Content Version: 11.9  © 2440-9953 [employer]. Care instructions adapted under license by [email or web address] (which disclaims liability or warranty for this information). If you have questions about a medical condition or this instruction, always ask your healthcare professional. [place] [address] any warranty or liability for your use of this information. Patient Education        Learning About COPD, Asthma, and Air Pollution  How does air pollution affect COPD and asthma? When you have COPD or asthma, air pollution may make your symptoms worse. If it does, it means that air pollution is a trigger for you.   It is important to know what your triggers are and how to deal with them. If air pollution is a trigger for you, you need to learn about air quality and pay attention to weather forecasts that include how bad the air is expected to be. How can you manage a flare-up caused by air pollution? · Do not panic. Quick treatment at home may help you prevent serious breathing problems. · Take your medicines exactly as your doctor tells you.  ? Use your quick-relief inhaler as directed by your doctor. If your symptoms do not get better after you use your medicine, have someone take you to the emergency room. Call an ambulance if necessary. ? With inhaled medicines, a spacer or a nebulizer may help you get more medicine to your lungs. Ask your doctor or pharmacist how to use them properly. Practice using the spacer in front of a mirror before you have a flare-up. This may help you get the medicine into your lungs quickly. ? If your doctor has given you steroid pills, take them as directed. ? Talk to your doctor if you have any problems with your medicine. What can you do to prevent flare-ups? · Try not to be outside when air pollution levels are high. Stay at home with your windows closed. · Do not smoke. This is the most important step you can take to prevent more damage to your lungs and prevent problems. If you already smoke, it is never too late to stop. If you need help quitting, talk to your doctor about stop-smoking programs and medicines. These can increase your chances of quitting for good. · Avoid secondhand smoke; cold, dry air; and high altitudes. · Take your daily medicines as prescribed. · Avoid colds and flu. ? Get a pneumococcal vaccine. ? Get a flu vaccine each year, as soon as it is available. Ask those you live or work with to do the same, so they will not get the flu and infect you. ? Try to stay away from people with colds or the flu. ? Wash your hands often.   Follow-up care is a key part of your treatment and safety. Be sure to make and go to all appointments, and call your doctor if you are having problems. It's also a good idea to know your test results and keep a list of the medicines you take. Where can you learn more? Go to http://etelvina-olivier.info/. Enter  in the search box to learn more about \"Learning About COPD, Asthma, and Air Pollution. \"  Current as of: September 5, 2018  Content Version: 11.9  © 9572-8289 [employer]. Care instructions adapted under license by [employer] (which disclaims liability or warranty for this information). If you have questions about a medical condition or this instruction, always ask your healthcare professional. [address] any warranty or liability for your use of this information. Patient Education     Learning About Diabetes and Your Teeth  How does diabetes affect your teeth and gums? When you have diabetes, managing blood sugar levels and taking good care of your teeth and gums are both important. When blood sugar levels are high, there's a greater risk for:  · Gum (periodontal) disease. · Tooth decay. · Fungal infections in the mouth, like thrush. · Dry mouth, or xerostomia (say \"zee-ruh-STO-manjeet-uh\"). The mouth needs saliva to neutralize the acids in your mouth. These acids can lead to gum disease and tooth decay. Keeping your blood sugar levels in your target range can help prevent problems with the teeth and gums. If you have any problems with your teeth or gums, see your dentist.  How do you care for your teeth and gums when you have diabetes? · Brush your teeth twice a day. · Floss daily. Make sure to press the floss against your teeth and not your gums. · Check each day for areas where your gums might be red or painful. Be sure to let your dentist know of any sores in your mouth.   · See your dentist regularly for professional cleaning of your teeth and to look for gum problems. Many dentists recommend getting checkups twice a year. Remind your dentist that you have diabetes before any work is done. · Don't smoke or use smokeless tobacco. Tobacco use with diabetes can lead to a greater risk of severe gum disease. If you need help quitting, talk to your doctor about stop-smoking programs and medicines. These can increase your chances of quitting for good. Follow-up care is a key part of your treatment and safety. Be sure to make and go to all appointments, and call your doctor if you are having problems. It's also a good idea to know your test results and keep a list of the medicines you take. Where can you learn more? Go to [employer]  Enter H523 in the search box to learn more about \"Learning About Diabetes and Your Teeth. \"   © 3756-1069 [employer]. Care instructions adapted under license by [employer] (which disclaims liability or warranty for this information). This care instruction is for use with your licensed healthcare professional. If you have questions about a medical condition or this instruction, always ask your healthcare professional. [place] [address] any warranty or liability for your use of this information. Content Version: 04.2.397824; Current as of: May 22, 2015           Patient Education        Learning About Diabetes and Coronary Artery Disease  How are diabetes and heart disease connected? Many people think diabetes and heart disease go hand in hand. But having diabetes doesn't have to mean that you are going to have a heart attack someday. Healthy living can help prevent many of the problems that come with both diabetes and heart disease. For some people, diabetes can cause problems in your body that may lead to heart disease. Diabetes can make the problems of heart disease worse. Experts do not fully understand how diabetes affects the heart.  Many things can lead to heart disease, including high blood sugar, insulin resistance, high cholesterol, and high blood pressure. But lifestyle and genetics may also affect a person's risk. But here's the good news: The good things you're doing to stay healthy with diabetes--eating healthy foods, quitting smoking, getting exercise and more--are also helping your heart. What increases your risk for heart disease? When you have diabetes, your risk for heart disease is even higher if you have:  · High blood pressure, which pushes blood through the arteries with too much force. Over time, this damages the walls of the arteries. · High cholesterol, which causes the buildup of a kind of fat inside the blood vessel walls. This buildup can lower blood flow to the heart muscle and raise your risk for having a heart attack. · Kidney damage, which shares many of the risk factors for heart disease (such as high blood sugar, high blood pressure, and high cholesterol). How can you keep your heart healthy when you have diabetes? Managing your diabetes and keeping your heart healthy are two sides of the same coin. Here are some things you can do. · Test your blood sugar levels and get your diabetes tests on schedule. Try to keep your numbers within your target range. · Keep track of your blood pressure. Your doctor will give you a goal that's right for you. If your blood pressure is high, your treatment may also include medicine. Changes in your lifestyle, such as staying at a healthy weight, may also help you lower your blood pressure. · Eat heart-healthy foods. These include fruits, vegetables, whole grains, fish, and low-fat or nonfat dairy foods. Limit sodium, alcohol, and sweets. · If your doctor recommends it, get more exercise. Walking is a good choice. Bit by bit, increase the amount you walk every day. Try for at least 30 minutes on most days of the week. · Do not smoke. Smoking can make diabetes and heart disease worse.  If you need help quitting, talk to your doctor about stop-smoking programs and medicines. These can increase your chances of quitting for good. · Your doctor may talk with you about taking medicines for your heart. For example, your doctor may suggest taking a statin or daily aspirin. Where can you learn more? Go to http://etelvina-olivier.info/. Enter V875 in the search box to learn more about \"Learning About Diabetes and Coronary Artery Disease. \"  Current as of: July 25, 2018  Content Version: 11.9  © 0564-2820 [employer]. Care instructions adapted under license by [employer] (which disclaims liability or warranty for this information). If you have questions about a medical condition or this instruction, always ask your healthcare professional. [place] [address] any warranty or liability for your use of this information. Patient Education        Learning About Diuretics for High Blood Pressure  Introduction  Diuretics help to lower blood pressure. This reduces your risk of a heart attack and stroke. It also reduces your risk of kidney disease. Diuretics cause your kidneys to remove sodium and water. They also relax the blood vessel walls. These help lower your blood pressure. Examples  · Chlorthalidone  · Hydrochlorothiazide  Possible side effects  There are some common side effects. They are:  · Too little potassium. · Feeling dizzy. · Rash. · Urinating a lot. · High blood sugar. (But this is not common.)  You may have other side effects. Check the information that comes with your medicine. What to know about taking this medicine  · You may take other medicines for blood pressure. Diuretics can help those work better. They can also prevent extra fluid in your body. · You may need to take potassium pills. Or you may have to watch how much potassium is in your food. Ask your doctor about this.   · You may need blood tests to check your kidneys and your potassium level.  · Take your medicines exactly as prescribed. Call your doctor if you think you are having a problem with your medicine. · Check with your doctor or pharmacist before you use any other medicines. This includes over-the-counter medicines. Make sure your doctor knows all of the medicines, vitamins, herbal products, and supplements you take. Taking some medicines together can cause problems. Where can you learn more? Go to http://etelvina-olivier.info/. Enter X755 in the search box to learn more about \"Learning About Diuretics for High Blood Pressure. \"  Current as of: July 22, 2018  Content Version: 11.9  © 4408-5050 [employer]. Care instructions adapted under license by [employer] (which disclaims liability or warranty for this information). If you have questions about a medical condition or this instruction, always ask your healthcare professional. [place] [address] any warranty or liability for your use of this information. Patient Education        Sleep Apnea: Care Instructions  Your Care Instructions    Sleep apnea means that you frequently stop breathing for 10 seconds or longer during sleep. It can be mild to severe, based on the number of times an hour that you stop breathing or have slowed breathing. Blocked or narrowed airways in your nose, mouth, or throat can cause sleep apnea. Your airway can become blocked when your throat muscles and tongue relax during sleep. You can treat sleep apnea at home by making lifestyle changes. You also can use a CPAP breathing machine that keeps tissues in the throat from blocking your airway. Or your doctor may suggest that you use a breathing device while you sleep. It helps keep your airway open. This could be a device that you put in your mouth. In some cases, surgery may be needed to remove enlarged tissues in the throat. Follow-up care is a key part of your treatment and safety.  Be sure to make and go to all appointments, and call your doctor if you are having problems. It's also a good idea to know your test results and keep a list of the medicines you take. How can you care for yourself at home? · Lose weight, if needed. It may reduce the number of times you stop breathing or have slowed breathing. · Sleep on your side. It may stop mild apnea. If you tend to roll onto your back, sew a pocket in the back of your pajama top. Put a tennis ball into the pocket, and stitch the pocket shut. This will help keep you from sleeping on your back. · Avoid alcohol and medicines such as sleeping pills and sedatives before bed. · Do not smoke. Smoking can make sleep apnea worse. If you need help quitting, talk to your doctor about stop-smoking programs and medicines. These can increase your chances of quitting for good. · Prop up the head of your bed 4 to 6 inches by putting bricks under the legs of the bed. · Treat breathing problems, such as a stuffy nose, caused by a cold or allergies. · Try a continuous positive airway pressure (CPAP) breathing machine if your doctor recommends it. The machine keeps your airway open when you sleep. · If CPAP does not work for you, ask your doctor if you can try other breathing machines. A bilevel positive airway pressure machine uses one type of air pressure for breathing in and another type for breathing out. Another device raises or lowers air pressure as needed while you breathe. · Talk to your doctor if:  ? Your nose feels dry or bleeds when you use one of these machines. You may need to increase moisture in the air. A humidifier may help. ? Your nose is runny or stuffy from using a breathing machine. Decongestants or a corticosteroid nasal spray may help. ? You are sleepy during the day and it gets in the way of the normal things you do. Do not drive when you are drowsy. When should you call for help?   Watch closely for changes in your health, and be sure to contact your doctor if:    · You still have sleep apnea even though you have made lifestyle changes.     · You are thinking of trying a device such as CPAP.     · You are having problems using a CPAP or similar machine. Where can you learn more? Go to http://etelvina-olivier.info/. Enter R996 in the search box to learn more about \"Sleep Apnea: Care Instructions. \"  Current as of: 2018  Content Version: 11.9  © 4446-5448 [employer]. Care instructions adapted under license by [employer] (which disclaims liability or warranty for this information). If you have questions about a medical condition or this instruction, always ask your healthcare professional. [address] any warranty or liability for your use of this information. Aniket Hollins arm    Patient armband removed and shredded    [employer] Activation    Thank you for requesting access to [employer]. Please follow the instructions below to securely access and download your online medical record. [employer] allows you to send messages to your doctor, view your test results, renew your prescriptions, schedule appointments, and more. How Do I Sign Up? 1. In your internet browser, go to [email or web address]  2. Click on the First Time User? Click Here link in the Sign In box. You will be redirect to the New Member Sign Up page. 3. Enter your [employer] Access Code exactly as it appears below. You will not need to use this code after youve completed the sign-up process. If you do not sign up before the expiration date, you must request a new code. [employer] Access Code: [email or web address]  Expires: 2019  1:32 PM (This is the date your [employer] access code will )    4. Enter the last four digits of your Social Security Number (xxxx) and Date of Birth (mm/dd/yyyy) as indicated and click Submit. You will be taken to the next sign-up page. 5. Create a [employer] ID.  This will be your [employer] login ID and cannot be changed, so think of one that is secure and easy to remember. 6. Create a [employer] password. You can change your password at any time. 7. Enter your Password Reset Question and Answer. This can be used at a later time if you forget your password. 8. Enter your e-mail address. You will receive e-mail notification when new information is available in [address] 9. Click Sign Up. You can now view and download portions of your medical record. 10. Click the Download Summary menu link to download a portable copy of your medical information. Additional Information    If you have questions, please visit the Frequently Asked Questions section of the [employer] website at [email or web address] Remember, [employer] is NOT to be used for urgent needs. For medical emergencies, dial 911. DISCHARGE SUMMARY from Nurse    PATIENT INSTRUCTIONS:    After general anesthesia or intravenous sedation, for 24 hours or while taking prescription Narcotics:  · Limit your activities  · Do not drive and operate hazardous machinery  · Do not make important personal or business decisions  · Do  not drink alcoholic beverages  · If you have not urinated within 8 hours after discharge, please contact your surgeon on call. Report the following to your surgeon:  · Excessive pain, swelling, redness or odor of or around the surgical area  · Temperature over 100.5  · Nausea and vomiting lasting longer than 4 hours or if unable to take medications  · Any signs of decreased circulation or nerve impairment to extremity: change in color, persistent  numbness, tingling, coldness or increase pain  · Any questions    What to do at Home:  Recommended activity: Activity as tolerated, ***    If you experience any of the following symptoms shortness of breath, chest pain, unresolved bleeding and/or pain, and fever above 100.6 unresolved, please follow up with nearest ER and/or PCP.     *  Please give a list of your current medications to your Primary Care Provider. *  Please update this list whenever your medications are discontinued, doses are      changed, or new medications (including over-the-counter products) are added. *  Please carry medication information at all times in case of emergency situations. These are general instructions for a healthy lifestyle:    No smoking/ No tobacco products/ Avoid exposure to second hand smoke  Surgeon General's Warning:  Quitting smoking now greatly reduces serious risk to your health. Obesity, smoking, and sedentary lifestyle greatly increases your risk for illness    A healthy diet, regular physical exercise & weight monitoring are important for maintaining a healthy lifestyle    You may be retaining fluid if you have a history of heart failure or if you experience any of the following symptoms:  Weight gain of 3 pounds or more overnight or 5 pounds in a week, increased swelling in our hands or feet or shortness of breath while lying flat in bed. Please call your doctor as soon as you notice any of these symptoms; do not wait until your next office visit. Recognize signs and symptoms of STROKE:    F-face looks uneven    A-arms unable to move or move unevenly    S-speech slurred or non-existent    T-time-call 911 as soon as signs and symptoms begin-DO NOT go       Back to bed or wait to see if you get better-TIME IS BRAIN. Warning Signs of HEART ATTACK     Call 911 if you have these symptoms:   Chest discomfort. Most heart attacks involve discomfort in the center of the chest that lasts more than a few minutes, or that goes away and comes back. It can feel like uncomfortable pressure, squeezing, fullness, or pain.  Discomfort in other areas of the upper body. Symptoms can include pain or discomfort in one or both arms, the back, neck, jaw, or stomach.  Shortness of breath with or without chest discomfort.    Other signs may include breaking out in a cold sweat, nausea, or lightheadedness. Don't wait more than five minutes to call 911 - MINUTES MATTER! Fast action can save your life. Calling 911 is almost always the fastest way to get lifesaving treatment. Emergency Medical Services staff can begin treatment when they arrive -- up to an hour sooner than if someone gets to the hospital by car. The discharge information has been reviewed with the patient and spouse. The patient verbalized understanding. Discharge medications reviewed with the patient and appropriate educational materials and side effects teaching were provided.   ___________________________________________________________________________________________________________________________________

## 2019-04-10 NOTE — PROGRESS NOTES
Intern Progress Note  [facility]       Patient: Noa Choudhary MRN: [id number]  CSN: [id number]    YOB: 1960  Age: 60 y.o. Sex: female    DOA: 4/5/2019 LOS:  LOS: 5 days                    Subjective:     Acute events: VICKY overnight. Patient use home CPAP overnight. Patient endorses improved SOB and cough. Patient feels ready to home today. Review of Systems   Constitutional: Negative for chills and fever. Respiratory: Positive for cough and shortness of breath. Cardiovascular: Negative for chest pain and palpitations. Gastrointestinal: Negative for nausea and vomiting. Objective:      Patient Vitals for the past 24 hrs:   Temp Pulse Resp BP SpO2   04/10/19 0748 97.5 °F (36.4 °C) 67 18 126/72 95 %   04/10/19 0351 97.7 °F (36.5 °C) 68 18 120/71 98 %   04/09/19 2357 98.1 °F (36.7 °C) 71 18 120/77 98 %   04/09/19 1951 97.8 °F (36.6 °C) 69 18 124/74 95 %   04/09/19 1646 97.7 °F (36.5 °C) 75 18 150/72 94 %   04/09/19 1139 97.5 °F (36.4 °C) 64 18 129/66 95 %         Intake/Output Summary (Last 24 hours) at 4/10/2019 0909  Last data filed at 4/10/2019 0353  Gross per 24 hour   Intake 1440 ml   Output 3100 ml   Net -1660 ml     Physical Exam:  General:  Alert and Responsive and in No acute distress. HEENT: Conjunctiva pink, sclera anicteric. EOMI.  Pharynx moist, nonerythematous.  Moist mucous membranes.    CV:  RRR, no murmurs. No visible pulsations or thrills.    RESP:  Unlabored breathing. CTAB. Equal expansion bilaterally. ABD:  Soft, nontender, nondistended.    MS:  No joint deformity or instability.  No atrophy. Neuro:  5/5 strength bilateral upper extremities and lower extremities.  A+Ox3. Ext:  Trace edema.  2+ radial and dp pulses bilaterally. Skin:  No rashes, lesions, or ulcers.  Good turgor.       Lab/Data Reviewed:  Recent Results (from the past 12 hour(s))   METABOLIC PANEL, BASIC    Collection Time: 04/10/19  4:33 AM   Result Value Ref Range Sodium 139 136 - 145 mmol/L    Potassium 3.6 3.5 - 5.5 mmol/L    Chloride 100 100 - 108 mmol/L    CO2 31 21 - 32 mmol/L    Anion gap 8 3.0 - 18 mmol/L    Glucose 135 (H) 74 - 99 mg/dL    BUN 16 7.0 - 18 MG/DL    Creatinine 1.06 0.6 - 1.3 MG/DL    BUN/Creatinine ratio 15 12 - 20      GFR est AA >60 >60 ml/min/1.73m2    GFR est non-AA 53 (L) >60 ml/min/1.73m2    Calcium 9.8 8.5 - 10.1 MG/DL   CBC WITH AUTOMATED DIFF    Collection Time: 04/10/19  4:33 AM   Result Value Ref Range    WBC 8.6 4.6 - 13.2 K/uL    RBC 4.25 4.20 - 5.30 M/uL    HGB 12.8 12.0 - 16.0 g/dL    HCT 36.9 35.0 - 45.0 %    MCV 86.8 74.0 - 97.0 FL    MCH 30.1 24.0 - 34.0 PG    MCHC 34.7 31.0 - 37.0 g/dL    RDW 13.2 11.6 - 14.5 %    PLATELET 834 417 - 378 K/uL    MPV 9.2 9.2 - 11.8 FL    NEUTROPHILS 59 40 - 73 %    LYMPHOCYTES 35 21 - 52 %    MONOCYTES 6 3 - 10 %    EOSINOPHILS 0 0 - 5 %    BASOPHILS 0 0 - 2 %    ABS. NEUTROPHILS 5.1 1.8 - 8.0 K/UL    ABS. LYMPHOCYTES 3.0 0.9 - 3.6 K/UL    ABS. MONOCYTES 0.5 0.05 - 1.2 K/UL    ABS. EOSINOPHILS 0.0 0.0 - 0.4 K/UL    ABS.  BASOPHILS 0.0 0.0 - 0.1 K/UL    DF AUTOMATED     GLUCOSE, POC    Collection Time: 04/10/19  7:51 AM   Result Value Ref Range    Glucose (POC) 118 (H) 70 - 110 mg/dL         Scheduled Medications Reviewed:  Current Facility-Administered Medications   Medication Dose Route Frequency    tiotropium (SPIRIVA) inhalation capsule 18 mcg  1 Cap Inhalation DAILY    budesonide-formoterol (SYMBICORT) 160-4.5 mcg/actuation HFA inhaler 2 Puff  2 Puff Inhalation BID RT    lisinopril (PRINIVIL, ZESTRIL) tablet 20 mg  20 mg Oral BID    levoFLOXacin (LEVAQUIN) tablet 750 mg  750 mg Oral Q24H    predniSONE (DELTASONE) tablet 60 mg  60 mg Oral DAILY WITH BREAKFAST    heparin (porcine) injection 5,000 Units  5,000 Units SubCUTAneous Q8H    aspirin delayed-release tablet 81 mg  81 mg Oral DAILY    fluticasone propionate (FLONASE) 50 mcg/actuation nasal spray 2 Spray  2 Spray Both Nostrils DAILY  hydroCHLOROthiazide (HYDRODIURIL) tablet 12.5 mg  12.5 mg Oral DAILY    montelukast (SINGULAIR) tablet 10 mg  10 mg Oral QHS    pantoprazole (PROTONIX) tablet 40 mg  40 mg Oral ACB    insulin lispro (HUMALOG) injection   SubCUTAneous AC&HS    insulin glargine (LANTUS) injection 30 Units  30 Units SubCUTAneous QHS         Imaging, microbiology, and EKG/Telemetry:  No results found. All Micro Results     Procedure Component Value Units Date/Time    CULTURE, BLOOD [[id number]] Collected:  04/05/19 1915    Order Status:  Completed Specimen:  Blood Updated:  04/10/19 0735     Special Requests: NO SPECIAL REQUESTS        Culture result: NO GROWTH 5 DAYS       CULTURE, BLOOD [[id number]] Collected:  04/05/19 1930    Order Status:  Completed Specimen:  Blood Updated:  04/10/19 0735     Special Requests: NO SPECIAL REQUESTS        Culture result: NO GROWTH 5 DAYS               Assessment/Plan   60 y.o. female with PMH COPD, diastolic CHF, DMT2, HTN, GERD, allergic rhinitis and SHERRIE, now admitted with COPD exacerbation.     COPD exacerbation, lactic acidosis- resolved  On presentation, patient was tachycardic and tachypneic with shortness of breath on 6L oxygen NC. Lactic normalized  - CBC, BMP, Mg daily   - Duonebs prn  - Levaquin 750 mg PO daily  - Prednisone 60 mg  - Continue supplemental oxygen  - CPAP overnight  - Continue singular 10 mg every day  - Hold advair 2 puffs BID daily  - Hold spiriva 1.25 mcg/act 2 puffs daily  - symbicort 160-4.5 mcg/act 2 puffs BID  - spiriva 18 mcg daily  - Tylenol prn  - PT/OT/CM  - consider respiratory culture      Diastolic CHF with preserved EF, HTN. Stable on exam. ECHO 7/2018 EF 66-70% with no wma and LVH. - continue aspirin 81 mg po daily  - Vital signs per unit routine  - Continue lisinopril 40 mg po daily  - Continue hctz 12.5 mg po daily     DMT2 BG on admission 264. Last HgbA1c 8 from 1/18/2019.   - [facility] Lantus 30U qhs  - Consistent carbohydrate diet  - Hemoglobin A1c 8 (4/5)     H/O SHERRIE  - CPAP qhs     GERD, Allergic rhinitis  - Hold home omeprazole 40 mg po daily   - Continue protonix 40 mg po daily  - Continue home famotidine 20 mg po bid prn   - Continue home simethicone prn   - Continue flonase     Diet: Diabetic  DVT Prophylaxis: SQH  Code Status: Full  Point of Contact: Caryn Relationship: Daughter [phone]     Disposition and anticipated LOS: d/c today    MD Kelli Reilly PGY-1  04/10/19 9:12 AM  Pager: [phone]

## 2019-04-10 NOTE — PROGRESS NOTES
Problem: Mobility Impaired (Adult and Pediatric)  Goal: *Acute Goals and Plan of Care (Insert Text)  Description  Physical Therapy Goals  Initiated 4/8/2019 and to be accomplished within 7 day(s)  1. Patient will move from supine to sit and sit to supine  in bed with independence. 2.  Patient will transfer from bed to chair and chair to bed with independence using the least restrictive device. 3.  Patient will perform sit to stand with independence. 4.  Patient will ambulate with independence for 150 feet with the least restrictive device. 5.  Patient will ascend/descend 2 stairs with bilateral handrail(s) with supervision/set-up. Outcome: Progressing Towards Goal   PHYSICAL THERAPY TREATMENT    Patient: Brittany Galindo (17 y.o. female)  Date: 4/10/2019  Diagnosis: COPD with acute exacerbation ([facility] [address] [J44.1] COPD with acute exacerbation ([facility] [address]       Precautions:      ASSESSMENT:  Pt found semi reclined in bed on room air and willing to work with PT. Pt performed bed mobility with Supervision. Pt's O2 sats 94% prior to gait training on room air. Pt ambulated into [facility] 120ft SBA without supplemental O2 and no AD. Pt demonstrated a decreased zee and no LOB and declined dizziness or lightheadedness. Pt's SaO2 remained between 93-94% during gait training. Pt required one brief standing rest break (<60sec). Pt returned to recliner chair in room and was positioned to comfort. Pt was left seated in recliner chair with all needs in reach and nursing notified. Progression toward goals:   ?      Improving appropriately and progressing toward goals  ? Improving slowly and progressing toward goals  ? Not making progress toward goals and plan of care will be adjusted     PLAN:  Patient continues to benefit from skilled intervention to address the above impairments. Continue treatment per established plan of care.   Discharge Recommendations:  Home Health PT  Further Equipment Recommendations for Discharge:  rollator     SUBJECTIVE:   Patient stated ? I am in good shape right now.?    OBJECTIVE DATA SUMMARY:   Critical Behavior:  Neurologic State: Alert, Appropriate for age  Orientation Level: Oriented X4  Cognition: Follows commands  Safety/Judgement: Awareness of environment, Fall prevention  Functional Mobility Training:  Transfers:  Sit to Stand: Supervision  Stand to Sit: Supervision  Balance:  Sitting: Intact  Standing: Impaired; Without support  Standing - Static: Good  Standing - Dynamic : Fair(fair+)  Ambulation/Gait Training:  Distance (ft): 120 Feet (ft)  Assistive Device: (No AD)  Ambulation - Level of Assistance: Stand-by assistance  Gait Abnormalities: Decreased step clearance  Base of Support: Center of gravity altered  Speed/Pam: Pace decreased (<100 feet/min)  Step Length: Right shortened;Left shortened  Interventions: Verbal cues  Pain:  Pain level pre-treatment: 0/10  Pain level post-treatment: 0/10   Activity Tolerance:   fair  Please refer to the flowsheet for vital signs taken during this treatment. After treatment:   ? Patient left in no apparent distress sitting up in chair  ? Patient left in no apparent distress in bed  ? Call bell left within reach  ? Nursing notified (RNCheryle Estimable)  ? Caregiver present  ? Bed alarm activated  ? SCDs applied      COMMUNICATION/EDUCATION:   ?         Role of Physical Therapy in the acute care setting. ?         Fall prevention education was provided and the patient/caregiver indicated understanding. ? Patient/family have participated as able in working toward goals and plan of care. ?         Patient/family agree to work toward stated goals and plan of care. ?         Patient understands intent and goals of therapy, but is neutral about his/her participation. ? Patient is unable to participate in stated goals/plan of care: ongoing with therapy staff.   ?         Other:        KYLE Lewis Calculation: 12 mins

## 2019-04-12 ENCOUNTER — HOME CARE VISIT (OUTPATIENT)
Dept: HOME HEALTH SERVICES | Facility: HOME HEALTH | Age: 59
End: 2019-04-12

## 2019-04-12 ENCOUNTER — HOME CARE VISIT (OUTPATIENT)
Dept: SCHEDULING | Facility: HOME HEALTH | Age: 59
End: 2019-04-12
Payer: MEDICARE

## 2019-04-12 PROCEDURE — G0299 HHS/HOSPICE OF RN EA 15 MIN: HCPCS

## 2019-04-12 PROCEDURE — 400013 HH SOC

## 2019-04-12 PROCEDURE — 3331090001 HH PPS REVENUE CREDIT

## 2019-04-12 PROCEDURE — 3331090002 HH PPS REVENUE DEBIT

## 2019-04-13 PROCEDURE — 3331090002 HH PPS REVENUE DEBIT

## 2019-04-13 PROCEDURE — 3331090001 HH PPS REVENUE CREDIT

## 2019-04-14 PROCEDURE — 3331090002 HH PPS REVENUE DEBIT

## 2019-04-14 PROCEDURE — 3331090001 HH PPS REVENUE CREDIT

## 2019-04-15 ENCOUNTER — HOME CARE VISIT (OUTPATIENT)
Dept: SCHEDULING | Facility: HOME HEALTH | Age: 59
End: 2019-04-15
Payer: MEDICARE

## 2019-04-15 ENCOUNTER — HOME CARE VISIT (OUTPATIENT)
Dept: HOME HEALTH SERVICES | Facility: HOME HEALTH | Age: 59
End: 2019-04-15

## 2019-04-15 ENCOUNTER — HOME CARE VISIT (OUTPATIENT)
Dept: HOME HEALTH SERVICES | Facility: HOME HEALTH | Age: 59
End: 2019-04-15
Payer: MEDICARE

## 2019-04-15 VITALS
SYSTOLIC BLOOD PRESSURE: 150 MMHG | WEIGHT: 253 LBS | HEIGHT: 63 IN | HEART RATE: 65 BPM | RESPIRATION RATE: 20 BRPM | TEMPERATURE: 97.1 F | OXYGEN SATURATION: 98 % | DIASTOLIC BLOOD PRESSURE: 84 MMHG | BODY MASS INDEX: 44.83 KG/M2

## 2019-04-15 PROCEDURE — G0300 HHS/HOSPICE OF LPN EA 15 MIN: HCPCS

## 2019-04-15 PROCEDURE — 3331090002 HH PPS REVENUE DEBIT

## 2019-04-15 PROCEDURE — G0152 HHCP-SERV OF OT,EA 15 MIN: HCPCS

## 2019-04-15 PROCEDURE — 3331090001 HH PPS REVENUE CREDIT

## 2019-04-16 VITALS
RESPIRATION RATE: 18 BRPM | SYSTOLIC BLOOD PRESSURE: 148 MMHG | OXYGEN SATURATION: 98 % | DIASTOLIC BLOOD PRESSURE: 90 MMHG | HEART RATE: 74 BPM | TEMPERATURE: 97.1 F

## 2019-04-16 PROCEDURE — 3331090001 HH PPS REVENUE CREDIT

## 2019-04-16 PROCEDURE — 3331090002 HH PPS REVENUE DEBIT

## 2019-04-17 PROCEDURE — 3331090002 HH PPS REVENUE DEBIT

## 2019-04-17 PROCEDURE — 3331090001 HH PPS REVENUE CREDIT

## 2019-04-18 ENCOUNTER — HOME CARE VISIT (OUTPATIENT)
Dept: SCHEDULING | Facility: HOME HEALTH | Age: 59
End: 2019-04-18
Payer: MEDICARE

## 2019-04-18 PROCEDURE — 3331090002 HH PPS REVENUE DEBIT

## 2019-04-18 PROCEDURE — 3331090001 HH PPS REVENUE CREDIT

## 2019-04-19 PROCEDURE — 3331090001 HH PPS REVENUE CREDIT

## 2019-04-19 PROCEDURE — 3331090002 HH PPS REVENUE DEBIT

## 2019-04-20 PROCEDURE — 3331090002 HH PPS REVENUE DEBIT

## 2019-04-20 PROCEDURE — 3331090001 HH PPS REVENUE CREDIT

## 2019-04-21 PROCEDURE — 3331090002 HH PPS REVENUE DEBIT

## 2019-04-21 PROCEDURE — 3331090001 HH PPS REVENUE CREDIT

## 2019-04-22 ENCOUNTER — HOME CARE VISIT (OUTPATIENT)
Dept: SCHEDULING | Facility: HOME HEALTH | Age: 59
End: 2019-04-22
Payer: MEDICARE

## 2019-04-22 PROCEDURE — 3331090001 HH PPS REVENUE CREDIT

## 2019-04-22 PROCEDURE — 3331090002 HH PPS REVENUE DEBIT

## 2019-04-23 ENCOUNTER — OFFICE VISIT (OUTPATIENT)
Dept: PULMONOLOGY | Age: 59
End: 2019-04-23

## 2019-04-23 VITALS
OXYGEN SATURATION: 96 % | RESPIRATION RATE: 24 BRPM | SYSTOLIC BLOOD PRESSURE: 130 MMHG | HEART RATE: 92 BPM | WEIGHT: 250 LBS | BODY MASS INDEX: 44.3 KG/M2 | TEMPERATURE: 97.9 F | HEIGHT: 63 IN | DIASTOLIC BLOOD PRESSURE: 70 MMHG

## 2019-04-23 DIAGNOSIS — G47.33 OSA ON CPAP: Primary | ICD-10-CM

## 2019-04-23 DIAGNOSIS — Z99.89 OSA ON CPAP: Primary | ICD-10-CM

## 2019-04-23 DIAGNOSIS — J96.11 CHRONIC RESPIRATORY FAILURE WITH HYPOXIA (HCC): ICD-10-CM

## 2019-04-23 DIAGNOSIS — J44.1 COPD EXACERBATION (HCC): ICD-10-CM

## 2019-04-23 DIAGNOSIS — J44.9 CHRONIC OBSTRUCTIVE PULMONARY DISEASE, UNSPECIFIED COPD TYPE (HCC): ICD-10-CM

## 2019-04-23 PROCEDURE — 3331090001 HH PPS REVENUE CREDIT

## 2019-04-23 PROCEDURE — 3331090002 HH PPS REVENUE DEBIT

## 2019-04-23 RX ORDER — PREDNISONE 10 MG/1
TABLET ORAL
Qty: 30 TAB | Refills: 1 | Status: ON HOLD | OUTPATIENT
Start: 2019-04-23 | End: 2019-05-31 | Stop reason: SDUPTHER

## 2019-04-23 NOTE — PATIENT INSTRUCTIONS
Discontinue Advair     continue Dulera 2 inhalations twice daily and remember to exhale fully before inhaling    Continue Spiriva Respimat 2 inhalations once daily    Albuterol 2 puffs every 4 hours as needed or by nebulization every 4 hours if you require albuterol too often to control respiratory symptoms call the office for severe symptoms go to the emergency room    Prednisone 1 tablet every other morning with breakfast monitor your blood sugars carefully and follow your diabetic diet carefully while on prednisone    Daliresp 1 (250 mg) tablet daily after 1 month if tolerated will increase dose

## 2019-04-23 NOTE — PROGRESS NOTES
[facility]  Pulmonary, Critical Care, and Sleep Medicine      Chief complaint:  COPD and acute exacerbation chronic respiratory failure SHERRIE on CPAP    HPI:    Tayo Queen    is 60years old and returns the office today for an exacerbation of COPD requiring hospitalization high-dose steroids and antibiotics and now the patient says her cough is dry and she still has some wheezing and shortness of breath but it is significantly improved she denies chest pain leg swelling. She is taking Advair but her doctor switched her to [facility] and she will pick this up at the pharmacy soon and she is on Spiriva and as needed albuterol and supplemental and also says she uses her CPAP faithfully. Allergies   Allergen Reactions    Ancef [Cefazolin] Hives    Contrast Agent [Iodine] Anaphylaxis, Shortness of Breath and Swelling     Needs pre-medication for IV contrast with Benadryl, Solu-Medrol    Fish Containing Products Anaphylaxis     Pt states she had a severe allergic reaction at 9 y/o.  Metformin Other (comments)     Abdominal pain, diarrhea.  Codeine Other (comments)     Altered mental status     Current Outpatient Medications   Medication Sig    fluticasone propionate (FLONASE ALLERGY RELIEF) 50 mcg/actuation nasal spray 2 Sprays by Both Nostrils route daily.  fluticasone-salmeterol (ADVAIR HFA) 115-21 mcg/actuation inhaler Take 2 Puffs by inhalation two (2) times a day.  hydroCHLOROthiazide (HYDRODIURIL) 12.5 mg tablet Take 12.5 mg by mouth daily.  tiotropium bromide (SPIRIVA RESPIMAT) 2.5 mcg/actuation inhaler Take 2 Puffs by inhalation daily.  insulin glargine (LANTUS,BASAGLAR) 100 unit/mL (3 mL) inpn 35 Units by SubCUTAneous route nightly. (Patient taking differently: 30 Units by SubCUTAneous route nightly.)    albuterol sulfate 90 mcg/actuation aepb Take 1 Puff by inhalation every four (4) hours.     NOVOLOG FLEXPEN U-100 INSULIN 100 unit/mL inpn Continue home Sliding scale insulin as prior to admission (Patient taking differently: 1 Units by SubCUTAneous route three (3) times daily. If BG <100=0u  101-150=5u  151-250=8u  251-300=12u  >300 call MD  )    albuterol (PROVENTIL VENTOLIN) 2.5 mg /3 mL (0.083 %) nebulizer solution 3 mL by Nebulization route every four (4) hours as needed for Wheezing. Indications: BRONCHOSPASM PREVENTION, Chronic Obstructive Pulmonary Disease    omeprazole (PRILOSEC) 40 mg capsule Take 40 mg by mouth daily. Indications: gastroesophageal reflux disease    lisinopril (PRINIVIL, ZESTRIL) 40 mg tablet Take 20 mg by mouth two (2) times a day. Indications: high blood pressure    simethicone (MYLICON) 80 mg chewable tablet Take 80 mg by mouth every six (6) hours as needed for Flatulence.  cpap machine kit by Does Not Apply route nightly. M.E.D.    OXYGEN-AIR DELIVERY SYSTEMS 2 L by Nasal route continuous. [facility] Choice    aspirin delayed-release 81 mg tablet Take 81 mg by mouth daily.  montelukast (SINGULAIR) 10 mg tablet Take 10 mg by mouth nightly.  mometasone/formoterol (DULERA IN) Take 1 Puff by inhalation daily. Indications: Bronchospasm Prevention with COPD, per pt    levoFLOXacin (LEVAQUIN) 750 mg tablet Take 1 Tab by mouth every twenty-four (24) hours. Indications: COPD exacebation    predniSONE (DELTASONE) 20 mg tablet Take 20 mg by mouth daily (with breakfast). Prednisone taper: Take 60 mg (3 tablets) for two days. Then 40 mg (two tablets) for three days. Then 20 mg (one tablet) for three days. Then 10 mg (half of one tablet) for three days. Indications: COPD exacerbation    albuterol-ipratropium (DUO-NEB) 2.5 mg-0.5 mg/3 ml nebu 3 mL by Nebulization route every six (6) hours as needed. (Patient taking differently: 3 mL by Nebulization route every six (6) hours as needed for Cough. as needed for cough, dyspnea)    famotidine (PEPCID) 20 mg tablet Take 20 mg by mouth two (2) times daily as needed for Other (reflux). No current facility-administered medications for this visit.       Past Medical History:   Diagnosis Date    Asthma     Chronic lung disease     COPD     Cystocele, midline     Diabetes mellitus (HCC)     GERD (gastroesophageal reflux disease)     Hidradenitis suppurativa     Hyperlipidemia     Hypertension     SHERRIE on CPAP     CPAP    Stress incontinence      Past Surgical History:   Procedure Laterality Date    BREAST SURGERY PROCEDURE UNLISTED      Right breast benign tumor removal    HX APPENDECTOMY      HX CHOLECYSTECTOMY      HX DILATION AND CURETTAGE      Dysfunctional uterine bleeding, thought 2/2 fibroids    HX TUBAL LIGATION       Social History     Socioeconomic History    Marital status: LEGALLY      Spouse name: Not on file    Number of children: Not on file    Years of education: Not on file    Highest education level: Not on file   Occupational History    Not on file   Social Needs    Financial resource strain: Not on file    Food insecurity:     Worry: Not on file     Inability: Not on file    Transportation needs:     Medical: Not on file     Non-medical: Not on file   Tobacco Use    Smoking status: Former Smoker     Packs/day: 1.00     Years: 30.00     Pack years: 30.00     Types: Cigarettes     Start date: 1966     Last attempt to quit: 2006     Years since quittin.6    Smokeless tobacco: Never Used    Tobacco comment: 1-1.5 packs per day   Substance and Sexual Activity    Alcohol use: No    Drug use: No    Sexual activity: Never   Lifestyle    Physical activity:     Days per week: Not on file     Minutes per session: Not on file    Stress: Not on file   Relationships    Social connections:     Talks on phone: Not on file     Gets together: Not on file     Attends [employer]ism service: Not on file     Active member of club or organization: Not on file     Attends meetings of clubs or organizations: Not on file     Relationship status: Not on file    Intimate partner violence:     Fear of current or ex partner: Not on file     Emotionally abused: Not on file     Physically abused: Not on file     Forced sexual activity: Not on file   Other Topics Concern    Not on file   Social History Narrative    Not on file     Family History   Problem Relation Age of Onset    Hypertension Mother     Stroke Mother     Breast Cancer Mother         Bilateral mastectomies    Cancer Mother         ovarian and breast    Heart Failure Mother     Heart Attack Father         2011    Heart Surgery Father         CABG    Heart Failure Father     COPD Sister         Heavy smoker    Cancer Sister         ovarian    Heart Failure Sister     Lung Disease Sister     Asthma Child     Cancer Maternal Aunt         pancreatic    Cancer Maternal Grandfather         stomach       Review of systems:  She denies fever chills poor appetite or weight loss    Physical Exam:  Visit Vitals  /70 (BP 1 Location: Left arm, BP Patient Position: Sitting)   Pulse 92   Temp 97.9 °F (36.6 °C)   Resp 24   Ht 5' 3\" (1.6 m)   Wt 113.4 kg (250 lb)   SpO2 96%   BMI 44.29 kg/m²       Well-developed obese  HEENT: WNL  Lymph node exam: Supraclavicular cervical lymph nodes negative  Chest: Equal symmetrical expansion no dullness no wheezes rales or rubs with prolonged expiratory phase of breath sounds  Heart: Regular rhythm no gallop no murmur  Extremities: No cyanosis clubbing or calf tenderness    Labs:    O2 sat on supplemental oxygen 2 L 96%  Chest x-ray 4/5/19 personally reviewed: No heart failure lung lesions or or infiltrates    Impression:   Resolving exacerbation of COPD  Exacerbation of COPD associated with bronchitis and the patient has had many episodes in the last year some requiring hospitalizations other requiring steroids LAMA has been refused Daliresp in the past which may be of great benefit    Plan:   Continue supplemental oxygen and CPAP  Every other day prednisone for now continue LABA and LAMA supplemental oxygen  Again applied for Daliresp  Follow-up in 4-6 weeks  Marlon Quiroga MD , [facility]    CC: Heather Burns MD     [address] [facility], [address] [address]     P: [phone]     F: [phone]

## 2019-04-24 PROCEDURE — 3331090001 HH PPS REVENUE CREDIT

## 2019-04-24 PROCEDURE — 3331090002 HH PPS REVENUE DEBIT

## 2019-04-24 NOTE — TELEPHONE ENCOUNTER
The [address] [place] [address] received a confusing prescription on Daliresp. The order is rewritten and pended to Dr. Adriana Edwards.

## 2019-04-25 ENCOUNTER — HOME CARE VISIT (OUTPATIENT)
Dept: SCHEDULING | Facility: HOME HEALTH | Age: 59
End: 2019-04-25
Payer: MEDICARE

## 2019-04-25 PROCEDURE — 3331090002 HH PPS REVENUE DEBIT

## 2019-04-25 PROCEDURE — 3331090001 HH PPS REVENUE CREDIT

## 2019-04-26 PROCEDURE — 3331090001 HH PPS REVENUE CREDIT

## 2019-04-26 PROCEDURE — 3331090002 HH PPS REVENUE DEBIT

## 2019-04-27 PROCEDURE — 3331090001 HH PPS REVENUE CREDIT

## 2019-04-27 PROCEDURE — 3331090002 HH PPS REVENUE DEBIT

## 2019-04-28 PROCEDURE — 3331090002 HH PPS REVENUE DEBIT

## 2019-04-28 PROCEDURE — 3331090001 HH PPS REVENUE CREDIT

## 2019-04-29 PROCEDURE — 3331090001 HH PPS REVENUE CREDIT

## 2019-04-29 PROCEDURE — 3331090002 HH PPS REVENUE DEBIT

## 2019-04-30 ENCOUNTER — HOME CARE VISIT (OUTPATIENT)
Dept: SCHEDULING | Facility: HOME HEALTH | Age: 59
End: 2019-04-30
Payer: MEDICARE

## 2019-04-30 VITALS
OXYGEN SATURATION: 96 % | DIASTOLIC BLOOD PRESSURE: 80 MMHG | RESPIRATION RATE: 18 BRPM | SYSTOLIC BLOOD PRESSURE: 150 MMHG | TEMPERATURE: 97.6 F | HEART RATE: 91 BPM

## 2019-04-30 PROCEDURE — 3331090002 HH PPS REVENUE DEBIT

## 2019-04-30 PROCEDURE — 3331090001 HH PPS REVENUE CREDIT

## 2019-04-30 PROCEDURE — G0299 HHS/HOSPICE OF RN EA 15 MIN: HCPCS

## 2019-05-01 PROCEDURE — 3331090001 HH PPS REVENUE CREDIT

## 2019-05-01 PROCEDURE — 3331090002 HH PPS REVENUE DEBIT

## 2019-05-02 PROCEDURE — 3331090001 HH PPS REVENUE CREDIT

## 2019-05-02 PROCEDURE — 3331090002 HH PPS REVENUE DEBIT

## 2019-05-03 ENCOUNTER — HOME CARE VISIT (OUTPATIENT)
Dept: HOME HEALTH SERVICES | Facility: HOME HEALTH | Age: 59
End: 2019-05-03
Payer: MEDICARE

## 2019-05-03 PROCEDURE — 3331090002 HH PPS REVENUE DEBIT

## 2019-05-03 PROCEDURE — 3331090001 HH PPS REVENUE CREDIT

## 2019-05-04 PROCEDURE — 3331090001 HH PPS REVENUE CREDIT

## 2019-05-04 PROCEDURE — 3331090002 HH PPS REVENUE DEBIT

## 2019-05-05 PROCEDURE — 3331090001 HH PPS REVENUE CREDIT

## 2019-05-05 PROCEDURE — 3331090002 HH PPS REVENUE DEBIT

## 2019-05-06 PROCEDURE — 3331090001 HH PPS REVENUE CREDIT

## 2019-05-06 PROCEDURE — 3331090002 HH PPS REVENUE DEBIT

## 2019-05-07 PROCEDURE — 3331090001 HH PPS REVENUE CREDIT

## 2019-05-07 PROCEDURE — 3331090002 HH PPS REVENUE DEBIT

## 2019-05-08 PROCEDURE — 3331090001 HH PPS REVENUE CREDIT

## 2019-05-08 PROCEDURE — 3331090002 HH PPS REVENUE DEBIT

## 2019-05-09 ENCOUNTER — APPOINTMENT (OUTPATIENT)
Dept: GENERAL RADIOLOGY | Age: 59
End: 2019-05-09
Attending: STUDENT IN AN ORGANIZED HEALTH CARE EDUCATION/TRAINING PROGRAM
Payer: MEDICARE

## 2019-05-09 ENCOUNTER — HOSPITAL ENCOUNTER (EMERGENCY)
Age: 59
Discharge: HOME | End: 2019-05-09
Attending: EMERGENCY MEDICINE
Payer: MEDICARE

## 2019-05-09 VITALS
SYSTOLIC BLOOD PRESSURE: 143 MMHG | BODY MASS INDEX: 49.6 KG/M2 | HEART RATE: 103 BPM | DIASTOLIC BLOOD PRESSURE: 59 MMHG | OXYGEN SATURATION: 97 % | RESPIRATION RATE: 25 BRPM | WEIGHT: 280 LBS | TEMPERATURE: 98.1 F

## 2019-05-09 DIAGNOSIS — J44.1 COPD EXACERBATION (HCC): Primary | ICD-10-CM

## 2019-05-09 LAB
ALBUMIN SERPL-MCNC: 3.6 G/DL (ref 3.4–5)
ALBUMIN/GLOB SERPL: 0.9 {RATIO} (ref 0.8–1.7)
ALP SERPL-CCNC: 95 U/L (ref 45–117)
ALT SERPL-CCNC: 45 U/L (ref 13–56)
ANION GAP SERPL CALC-SCNC: 3 MMOL/L (ref 3–18)
APPEARANCE UR: CLEAR
ARTERIAL PATENCY WRIST A: ABNORMAL
AST SERPL-CCNC: 23 U/L (ref 15–37)
ATRIAL RATE: 101 BPM
BASE DEFICIT BLD-SCNC: 2 MMOL/L
BASOPHILS # BLD: 0 K/UL (ref 0–0.1)
BASOPHILS NFR BLD: 0 % (ref 0–2)
BDY SITE: ABNORMAL
BILIRUB SERPL-MCNC: 0.9 MG/DL (ref 0.2–1)
BILIRUB UR QL: NEGATIVE
BODY TEMPERATURE: 36.7
BUN SERPL-MCNC: 13 MG/DL (ref 7–18)
BUN/CREAT SERPL: 15 (ref 12–20)
CALCIUM SERPL-MCNC: 8.9 MG/DL (ref 8.5–10.1)
CALCULATED P AXIS, ECG09: 45 DEGREES
CALCULATED R AXIS, ECG10: 25 DEGREES
CALCULATED T AXIS, ECG11: 53 DEGREES
CHLORIDE SERPL-SCNC: 104 MMOL/L (ref 100–108)
CO2 SERPL-SCNC: 29 MMOL/L (ref 21–32)
COLOR UR: YELLOW
CREAT SERPL-MCNC: 0.89 MG/DL (ref 0.6–1.3)
DIAGNOSIS, 93000: NORMAL
DIFFERENTIAL METHOD BLD: ABNORMAL
EOSINOPHIL # BLD: 0.1 K/UL (ref 0–0.4)
EOSINOPHIL NFR BLD: 1 % (ref 0–5)
ERYTHROCYTE [DISTWIDTH] IN BLOOD BY AUTOMATED COUNT: 13.8 % (ref 11.6–14.5)
GAS FLOW.O2 O2 DELIVERY SYS: ABNORMAL L/MIN
GAS FLOW.O2 SETTING OXYMISER: 2 L/M
GLOBULIN SER CALC-MCNC: 3.8 G/DL (ref 2–4)
GLUCOSE SERPL-MCNC: 246 MG/DL (ref 74–99)
GLUCOSE UR STRIP.AUTO-MCNC: >1000 MG/DL
HCO3 BLD-SCNC: 24.7 MMOL/L (ref 22–26)
HCT VFR BLD AUTO: 41.1 % (ref 35–45)
HGB BLD-MCNC: 14.5 G/DL (ref 12–16)
HGB UR QL STRIP: NEGATIVE
KETONES UR QL STRIP.AUTO: ABNORMAL MG/DL
LEUKOCYTE ESTERASE UR QL STRIP.AUTO: NEGATIVE
LYMPHOCYTES # BLD: 1.1 K/UL (ref 0.9–3.6)
LYMPHOCYTES NFR BLD: 10 % (ref 21–52)
MCH RBC QN AUTO: 30.7 PG (ref 24–34)
MCHC RBC AUTO-ENTMCNC: 35.3 G/DL (ref 31–37)
MCV RBC AUTO: 86.9 FL (ref 74–97)
MONOCYTES # BLD: 0.6 K/UL (ref 0.05–1.2)
MONOCYTES NFR BLD: 5 % (ref 3–10)
NEUTS SEG # BLD: 9.8 K/UL (ref 1.8–8)
NEUTS SEG NFR BLD: 84 % (ref 40–73)
NITRITE UR QL STRIP.AUTO: NEGATIVE
O2/TOTAL GAS SETTING VFR VENT: 28 %
P-R INTERVAL, ECG05: 144 MS
PCO2 BLD: 46.5 MMHG (ref 35–45)
PH BLD: 7.33 [PH] (ref 7.35–7.45)
PH UR STRIP: 5 [PH] (ref 5–8)
PLATELET # BLD AUTO: 251 K/UL (ref 135–420)
PMV BLD AUTO: 9 FL (ref 9.2–11.8)
PO2 BLD: 77 MMHG (ref 80–100)
POTASSIUM SERPL-SCNC: 4.2 MMOL/L (ref 3.5–5.5)
PROT SERPL-MCNC: 7.4 G/DL (ref 6.4–8.2)
PROT UR STRIP-MCNC: NEGATIVE MG/DL
Q-T INTERVAL, ECG07: 350 MS
QRS DURATION, ECG06: 80 MS
QTC CALCULATION (BEZET), ECG08: 453 MS
RBC # BLD AUTO: 4.73 M/UL (ref 4.2–5.3)
SAO2 % BLD: 94 % (ref 92–97)
SERVICE CMNT-IMP: ABNORMAL
SODIUM SERPL-SCNC: 136 MMOL/L (ref 136–145)
SP GR UR REFRACTOMETRY: >1.03 (ref 1–1.03)
SPECIMEN TYPE: ABNORMAL
TOTAL RESP. RATE, ITRR: 30
UROBILINOGEN UR QL STRIP.AUTO: 1 EU/DL (ref 0.2–1)
VENTRICULAR RATE, ECG03: 101 BPM
WBC # BLD AUTO: 11.5 K/UL (ref 4.6–13.2)

## 2019-05-09 PROCEDURE — 74011250637 HC RX REV CODE- 250/637: Performed by: STUDENT IN AN ORGANIZED HEALTH CARE EDUCATION/TRAINING PROGRAM

## 2019-05-09 PROCEDURE — 82803 BLOOD GASES ANY COMBINATION: CPT

## 2019-05-09 PROCEDURE — 77030029684 HC NEB SM VOL KT MONA -A

## 2019-05-09 PROCEDURE — 3331090001 HH PPS REVENUE CREDIT

## 2019-05-09 PROCEDURE — 99285 EMERGENCY DEPT VISIT HI MDM: CPT

## 2019-05-09 PROCEDURE — 85025 COMPLETE CBC W/AUTO DIFF WBC: CPT

## 2019-05-09 PROCEDURE — 93005 ELECTROCARDIOGRAM TRACING: CPT

## 2019-05-09 PROCEDURE — 74011000250 HC RX REV CODE- 250: Performed by: STUDENT IN AN ORGANIZED HEALTH CARE EDUCATION/TRAINING PROGRAM

## 2019-05-09 PROCEDURE — 96374 THER/PROPH/DIAG INJ IV PUSH: CPT

## 2019-05-09 PROCEDURE — 3331090002 HH PPS REVENUE DEBIT

## 2019-05-09 PROCEDURE — 94640 AIRWAY INHALATION TREATMENT: CPT

## 2019-05-09 PROCEDURE — 80053 COMPREHEN METABOLIC PANEL: CPT

## 2019-05-09 PROCEDURE — 81003 URINALYSIS AUTO W/O SCOPE: CPT

## 2019-05-09 PROCEDURE — 36600 WITHDRAWAL OF ARTERIAL BLOOD: CPT

## 2019-05-09 PROCEDURE — 71045 X-RAY EXAM CHEST 1 VIEW: CPT

## 2019-05-09 PROCEDURE — 74011250636 HC RX REV CODE- 250/636: Performed by: STUDENT IN AN ORGANIZED HEALTH CARE EDUCATION/TRAINING PROGRAM

## 2019-05-09 RX ORDER — PREDNISONE 50 MG/1
50 TABLET ORAL DAILY
Qty: 5 TAB | Refills: 0 | Status: SHIPPED | OUTPATIENT
Start: 2019-05-09 | End: 2019-05-14

## 2019-05-09 RX ORDER — IPRATROPIUM BROMIDE AND ALBUTEROL SULFATE 2.5; .5 MG/3ML; MG/3ML
3 SOLUTION RESPIRATORY (INHALATION)
Status: COMPLETED | OUTPATIENT
Start: 2019-05-09 | End: 2019-05-09

## 2019-05-09 RX ORDER — DOXYCYCLINE 100 MG/1
200 CAPSULE ORAL
Status: COMPLETED | OUTPATIENT
Start: 2019-05-09 | End: 2019-05-09

## 2019-05-09 RX ORDER — DOXYCYCLINE HYCLATE 100 MG
100 TABLET ORAL 2 TIMES DAILY
Qty: 20 TAB | Refills: 0 | Status: SHIPPED | OUTPATIENT
Start: 2019-05-09 | End: 2019-05-19

## 2019-05-09 RX ORDER — PREDNISONE 20 MG/1
20 TABLET ORAL
Qty: 17 TAB | Refills: 0 | Status: SHIPPED | OUTPATIENT
Start: 2019-05-09 | End: 2019-05-27

## 2019-05-09 RX ADMIN — DOXYCYCLINE HYCLATE 200 MG: 100 CAPSULE ORAL at 13:35

## 2019-05-09 RX ADMIN — IPRATROPIUM BROMIDE AND ALBUTEROL SULFATE 3 ML: .5; 3 SOLUTION RESPIRATORY (INHALATION) at 13:21

## 2019-05-09 RX ADMIN — METHYLPREDNISOLONE SODIUM SUCCINATE 125 MG: 125 INJECTION, POWDER, FOR SOLUTION INTRAMUSCULAR; INTRAVENOUS at 13:35

## 2019-05-09 RX ADMIN — IPRATROPIUM BROMIDE AND ALBUTEROL SULFATE 3 ML: .5; 3 SOLUTION RESPIRATORY (INHALATION) at 15:09

## 2019-05-09 NOTE — ED PROVIDER NOTES
Emergency Department Treatment Report    Patient: Brittany Galindo Age: 60 y.o. Sex: female    YOB: 1960 Admit Date: 5/9/2019 PCP: Staci Macias MD   MRN: [id number]  CSN: [id number]     Room: [place] [address] Time Dictated: 12:46 PM      Chief Complaint   Chief Complaint   Patient presents with    COPD       History of Present Illness   60 y.o. female, PMH COPD, diastolic CHF, DMT2, HTN, GERD, allergic rhinitis and SHERRIE presents with the complaint of productive cough. Patient reports on Monday she started experiencing cough productive of green sputum. It started getting progressively worse until today. Also patient reports shortness of breath that is getting worse. She uses 2 L NC at home. She has been taking her controller medications daily. Has been using her albuterol nebulizer more often this past week. Has been using it every hour since feeling sick. Reports chest pain that feels like a band like across her chest. She thinks is associated with the coughing. Associated symptoms headache, nausea, vomiting, and diarrhea. Patient was brought by EMS. She was given duo-nebs, solumedrol,albuterol and phenergan. Review of Systems   Constitutional: No fever, chills, or weight loss. Eyes: No visual symptoms. ENT: No sore throat, runny nose or ear pain. Respiratory: Cough, dyspnea. Cardiovascular: Chest pain. No pressure, palpitations, tightness or heaviness. Gastrointestinal: Vomiting, diarrhea, abdominal pain. Genitourinary: No dysuria, frequency, or urgency. Musculoskeletal: No joint pain or swelling. Integumentary: No rashes. Neurological:  Headaches. No sensory or motor symptoms. Denies complaints in all other systems.     Past Medical/Surgical History     Past Medical History:   Diagnosis Date    Asthma     Chronic lung disease     COPD     Cystocele, midline     Diabetes mellitus ([facility] [address]     GERD (gastroesophageal reflux disease)     Hidradenitis suppurativa     Hyperlipidemia     Hypertension     SHERRIE on CPAP     CPAP    Stress incontinence      Past Surgical History:   Procedure Laterality Date    BREAST SURGERY PROCEDURE UNLISTED      Right breast benign tumor removal    HX APPENDECTOMY      HX CHOLECYSTECTOMY      HX DILATION AND CURETTAGE      Dysfunctional uterine bleeding, thought 2/2 fibroids    HX TUBAL LIGATION         Social History     Social History     Socioeconomic History    Marital status: LEGALLY      Spouse name: Not on file    Number of children: Not on file    Years of education: Not on file    Highest education level: Not on file   Tobacco Use    Smoking status: Former Smoker     Packs/day: 1.00     Years: 30.00     Pack years: 30.00     Types: Cigarettes     Start date: 1966     Last attempt to quit: 2006     Years since quittin.6    Smokeless tobacco: Never Used    Tobacco comment: 1-1.5 packs per day   Substance and Sexual Activity    Alcohol use: No    Drug use: No    Sexual activity: Never       Family History     Family History   Problem Relation Age of Onset    Hypertension Mother     Stroke Mother     Breast Cancer Mother         Bilateral mastectomies    Cancer Mother         ovarian and breast    Heart Failure Mother     Heart Attack Father         2011    Heart Surgery Father         CABG    Heart Failure Father     COPD Sister         Heavy smoker    Cancer Sister         ovarian    Heart Failure Sister     Lung Disease Sister     Asthma Child     Cancer Maternal Aunt         pancreatic    Cancer Maternal Grandfather         stomach       Home Medications     Prior to Admission Medications   Prescriptions Last Dose Informant Patient Reported? Taking? NOVOLOG FLEXPEN U-100 INSULIN 100 unit/mL inpn  Self No No   Sig: Continue home Sliding scale insulin as prior to admission   Patient taking differently: 1 Units by SubCUTAneous route three (3) times daily.  If BG <100=0u  101-150=5u  151-250=8u  251-300=12u  >300 call MD     OXYGEN-AIR DELIVERY SYSTEMS   Yes No   Si L by Nasal route continuous. [facility]   albuterol (PROVENTIL VENTOLIN) 2.5 mg /3 mL (0.083 %) nebulizer solution   No No   Sig: 3 mL by Nebulization route every four (4) hours as needed for Wheezing. Indications: BRONCHOSPASM PREVENTION, Chronic Obstructive Pulmonary Disease   albuterol sulfate 90 mcg/actuation aepb   No No   Sig: Take 1 Puff by inhalation every four (4) hours. albuterol-ipratropium (DUO-NEB) 2.5 mg-0.5 mg/3 ml nebu   No No   Sig: 3 mL by Nebulization route every six (6) hours as needed. Patient taking differently: 3 mL by Nebulization route every six (6) hours as needed for Cough. as needed for cough, dyspnea   aspirin delayed-release 81 mg tablet   Yes No   Sig: Take 81 mg by mouth daily. cpap machine kit   Yes No   Sig: by Does Not Apply route nightly. M.E.D.   famotidine (PEPCID) 20 mg tablet   Yes No   Sig: Take 20 mg by mouth two (2) times daily as needed for Other (reflux). fluticasone propionate (FLONASE ALLERGY RELIEF) 50 mcg/actuation nasal spray   Yes No   Si Sprays by Both Nostrils route daily. fluticasone-salmeterol (ADVAIR HFA) 115-21 mcg/actuation inhaler   Yes No   Sig: Take 2 Puffs by inhalation two (2) times a day. hydroCHLOROthiazide (HYDRODIURIL) 12.5 mg tablet   Yes No   Sig: Take 12.5 mg by mouth daily. insulin glargine (LANTUS,BASAGLAR) 100 unit/mL (3 mL) inpn   No No   Si Units by SubCUTAneous route nightly. Patient taking differently: 30 Units by SubCUTAneous route nightly. levoFLOXacin (LEVAQUIN) 750 mg tablet   No No   Sig: Take 1 Tab by mouth every twenty-four (24) hours. Indications: COPD exacebation   lisinopril (PRINIVIL, ZESTRIL) 40 mg tablet   Yes No   Sig: Take 20 mg by mouth two (2) times a day. Indications: high blood pressure   mometasone/formoterol (DULERA IN)   Yes No   Sig: Take 1 Puff by inhalation daily.  Indications: Bronchospasm Prevention with COPD, per pt   montelukast (SINGULAIR) 10 mg tablet   Yes No   Sig: Take 10 mg by mouth nightly. omeprazole (PRILOSEC) 40 mg capsule   Yes No   Sig: Take 40 mg by mouth daily. Indications: gastroesophageal reflux disease   predniSONE (DELTASONE) 10 mg tablet   No No   Si tablet with breakfast every other morning   predniSONE (DELTASONE) 20 mg tablet   No No   Sig: Take 20 mg by mouth daily (with breakfast). Prednisone taper: Take 60 mg (3 tablets) for two days. Then 40 mg (two tablets) for three days. Then 20 mg (one tablet) for three days. Then 10 mg (half of one tablet) for three days. Indications: COPD exacerbation   roflumilast (DALIRESP) 250 mcg tab   No No   Sig: Take 1 Tab by mouth daily. for four weeks then call MD for further direction. simethicone (MYLICON) 80 mg chewable tablet   Yes No   Sig: Take 80 mg by mouth every six (6) hours as needed for Flatulence. tiotropium bromide (SPIRIVA RESPIMAT) 2.5 mcg/actuation inhaler   Yes No   Sig: Take 2 Puffs by inhalation daily. Facility-Administered Medications: None       Allergies     Allergies   Allergen Reactions    Ancef [Cefazolin] Hives    Contrast Agent [Iodine] Anaphylaxis, Shortness of Breath and Swelling     Needs pre-medication for IV contrast with Benadryl, Solu-Medrol    Fish Containing Products Anaphylaxis     Pt states she had a severe allergic reaction at 7 y/o.  Metformin Other (comments)     Abdominal pain, diarrhea.  Codeine Other (comments)     Altered mental status       Physical Exam     ED Triage Vitals   ED Encounter Vitals Group      BP       Pulse       Resp       Temp       Temp src       SpO2       Weight       Height      Constitutional: Patient appears well developed and well nourished. Appearance and behavior are age and situation appropriate. HEENT: Conjunctiva clear. PERRLA. Mucous membranes moist, non-erythematous.  Surface of the pharynx, palate, and tongue are pink, moist and without lesions. Neck: supple, non tender, symmetrical.   Respiratory: decreased breath sounds bilaterally, labored respirations. Expiratory wheezing. No tachypnea or accessory muscle use. Cardiovascular: heart regular rate and rhythm without murmur rubs or gallops. Calves soft and non-tender. Distal pulses 2+ and equal bilaterally. No peripheral edema. Gastrointestinal:  Abdomen soft, nontender without complaint of pain to palpation. Musculoskeletal: Nail beds pink with prompt capillary refill. Integumentary: warm and dry without rashes or lesions. Neurologic: alert and oriented, Sensation intact, motor strength equal and symmetric. No facial asymmetry or dysarthria. Diagnostic Studies   Lab:   Recent Results (from the past 12 hour(s))   CBC WITH AUTOMATED DIFF    Collection Time: 05/09/19  1:30 PM   Result Value Ref Range    WBC 11.5 4.6 - 13.2 K/uL    RBC 4.73 4.20 - 5.30 M/uL    HGB 14.5 12.0 - 16.0 g/dL    HCT 41.1 35.0 - 45.0 %    MCV 86.9 74.0 - 97.0 FL    MCH 30.7 24.0 - 34.0 PG    MCHC 35.3 31.0 - 37.0 g/dL    RDW 13.8 11.6 - 14.5 %    PLATELET 955 197 - 467 K/uL    MPV 9.0 (L) 9.2 - 11.8 FL    NEUTROPHILS 84 (H) 40 - 73 %    LYMPHOCYTES 10 (L) 21 - 52 %    MONOCYTES 5 3 - 10 %    EOSINOPHILS 1 0 - 5 %    BASOPHILS 0 0 - 2 %    ABS. NEUTROPHILS 9.8 (H) 1.8 - 8.0 K/UL    ABS. LYMPHOCYTES 1.1 0.9 - 3.6 K/UL    ABS. MONOCYTES 0.6 0.05 - 1.2 K/UL    ABS. EOSINOPHILS 0.1 0.0 - 0.4 K/UL    ABS.  BASOPHILS 0.0 0.0 - 0.1 K/UL    DF AUTOMATED     METABOLIC PANEL, COMPREHENSIVE    Collection Time: 05/09/19  1:30 PM   Result Value Ref Range    Sodium 136 136 - 145 mmol/L    Potassium 4.2 3.5 - 5.5 mmol/L    Chloride 104 100 - 108 mmol/L    CO2 29 21 - 32 mmol/L    Anion gap 3 3.0 - 18 mmol/L    Glucose 246 (H) 74 - 99 mg/dL    BUN 13 7.0 - 18 MG/DL    Creatinine 0.89 0.6 - 1.3 MG/DL    BUN/Creatinine ratio 15 12 - 20      GFR est AA >60 >60 ml/min/1.73m2    GFR est non-AA >60 >60 ml/min/1.73m2 Calcium 8.9 8.5 - 10.1 MG/DL    Bilirubin, total 0.9 0.2 - 1.0 MG/DL    ALT (SGPT) 45 13 - 56 U/L    AST (SGOT) 23 15 - 37 U/L    Alk. phosphatase 95 45 - 117 U/L    Protein, total 7.4 6.4 - 8.2 g/dL    Albumin 3.6 3.4 - 5.0 g/dL    Globulin 3.8 2.0 - 4.0 g/dL    A-G Ratio 0.9 0.8 - 1.7     EKG, 12 LEAD, INITIAL    Collection Time: 05/09/19  1:45 PM   Result Value Ref Range    Ventricular Rate 101 BPM    Atrial Rate 101 BPM    P-R Interval 144 ms    QRS Duration 80 ms    Q-T Interval 350 ms    QTC Calculation (Bezet) 453 ms    Calculated P Axis 45 degrees    Calculated R Axis 25 degrees    Calculated T Axis 53 degrees    Diagnosis       Sinus tachycardia  Cannot rule out Anterior infarct (cited on or before 05-APR-2019)  Abnormal ECG  When compared with ECG of 05-APR-2019 19:23,  No significant change was found  Confirmed by Kayla Guthrie MD, ----- ([id number]) on 5/9/2019 4:30:30 PM     URINALYSIS W/ RFLX MICROSCOPIC    Collection Time: 05/09/19  4:00 PM   Result Value Ref Range    Color YELLOW      Appearance CLEAR      Specific gravity >1.030 (H) 1.005 - 1.030    pH (UA) 5.0 5.0 - 8.0      Protein NEGATIVE  NEG mg/dL    Glucose >1,000 (A) NEG mg/dL    Ketone TRACE (A) NEG mg/dL    Bilirubin NEGATIVE  NEG      Blood NEGATIVE  NEG      Urobilinogen 1.0 0.2 - 1.0 EU/dL    Nitrites NEGATIVE  NEG      Leukocyte Esterase NEGATIVE  NEG         Imaging:    Xr Chest Port    Result Date: 5/9/2019  EXAM: Chest Radiograph INDICATION: Cough and chest pain TECHNIQUE: AP view of the chest COMPARISON: 4/5/2019, 1/18/2019, 1/10/2019 and 12/14/2018 FINDINGS: No pneumothorax identified. The lungs are clear. No infiltrates appreciated. No effusions identified. The cardiomediastinal silhouette is unremarkable. The pulmonary vasculature is unremarkable. Degenerative changes are noted in the thoracic spine. Impression: 1. No acute cardiopulmonary process.         ED Course/Medical Decision Making     Rigoberto Hampton is a 59 y/o patient with COPD, diastolic CHF, DMT2, HTN, GERD, allergic rhinitis and SHERRIE who presented with cough productive of green sputum and shortness of breath. DDX: pneumonia, pneumothorax, CHF exacerbation,bronchitis etc.Symptoms most consistent with a COPD exacerbation. CXR, CBC,BMP, U/A was obtained. The CXR showed no acute cardiopulmunary process. CBC,BMP,U/A were unremarkable. Patient was given 2 duo-nebs, doxycycline and methylprednisolone. On examination there was no wheezing on auscultation. Patient had a walking trial and her saturation remained steady. Patient agreed to go home and will return to the ER if her breathing worsens. Will followed tomorrow at [facility] 11:40 am appointment. EKG: sinus tachycardia. Medications   albuterol-ipratropium (DUO-NEB) 2.5 MG-0.5 MG/3 ML (3 mL Nebulization Given 5/9/19 1321)   doxycycline (VIBRAMYCIN) capsule 200 mg (200 mg Oral Given 5/9/19 1335)   methylPREDNISolone (PF) (Solu-MEDROL) injection 125 mg (125 mg IntraVENous Given 5/9/19 1335)   albuterol-ipratropium (DUO-NEB) 2.5 MG-0.5 MG/3 ML (3 mL Nebulization Given 5/9/19 1509)       Final Diagnosis       ICD-10-CM ICD-9-CM   1. COPD exacerbation ([facility] J44.1 491.21       Disposition   Patient is discharged home in stable condition. Advised to follow with their primary care physician. Patient advised to return to the ER for any new or worsening symptoms. My Medications      ASK your doctor about these medications      Instructions Each Dose to Equal Morning Noon Evening Bedtime   * albuterol 2.5 mg /3 mL (0.083 %) nebulizer solution  Commonly known as:  PROVENTIL VENTOLIN    Your last dose was: Your next dose is:          3 mL by Nebulization route every four (4) hours as needed for Wheezing. Indications: BRONCHOSPASM PREVENTION, Chronic Obstructive Pulmonary Disease   2.5 mg                 * albuterol sulfate 90 mcg/actuation Aepb    Your last dose was: Your next dose is:           Take 1 Puff by inhalation every four (4) hours. 1 Puff                 albuterol-ipratropium 2.5 mg-0.5 mg/3 ml Nebu  Commonly known as:  DUO-NEB    Your last dose was: Your next dose is:          3 mL by Nebulization route every six (6) hours as needed. 3 mL                 aspirin delayed-release 81 mg tablet    Your last dose was: Your next dose is: Take 81 mg by mouth daily. 81 mg                 cpap machine kit    Your last dose was: Your next dose is:          by Does Not Apply route nightly. [place]    Your last dose was: Your next dose is: Take 1 Puff by inhalation daily. Indications: Bronchospasm Prevention with COPD, per pt   1 Puff                 famotidine 20 mg tablet  Commonly known as:  PEPCID    Your last dose was: Your next dose is: Take 20 mg by mouth two (2) times daily as needed for Other (reflux). 20 mg                 FLONASE ALLERGY RELIEF 50 mcg/actuation nasal spray  Generic drug:  fluticasone propionate    Your last dose was: Your next dose is: 2 Sprays by Both Nostrils route daily. 2 Spray                 fluticasone propion-salmeterol 115-21 mcg/actuation inhaler  Commonly known as:  ADVAIR HFA    Your last dose was: Your next dose is: Take 2 Puffs by inhalation two (2) times a day. 2 Puff                 hydroCHLOROthiazide 12.5 mg tablet  Commonly known as:  HYDRODIURIL    Your last dose was: Your next dose is: Take 12.5 mg by mouth daily. 12.5 mg                 insulin glargine 100 unit/mL (3 mL) Inpn  Commonly known as:  JUDY MEEKS    Your last dose was: Your next dose is:          35 Units by SubCUTAneous route nightly. 35 Units                 levoFLOXacin 750 mg tablet  Commonly known as:  LEVAQUIN    Your last dose was: Your next dose is: Take 1 Tab by mouth every twenty-four (24) hours.  Indications: COPD exacebation   750 mg lisinopril 40 mg tablet  Commonly known as:  Kobe Murray    Your last dose was: Your next dose is: Take 20 mg by mouth two (2) times a day. Indications: high blood pressure   20 mg                 NovoLOG Flexpen U-100 Insulin 100 unit/mL (3 mL) Inpn  Generic drug:  insulin aspart U-100    Your last dose was: Your next dose is:          Continue home Sliding scale insulin as prior to admission                  omeprazole 40 mg capsule  Commonly known as:  PRILOSEC    Your last dose was: Your next dose is: Take 40 mg by mouth daily. Indications: gastroesophageal reflux disease   40 mg                 OXYGEN-AIR DELIVERY SYSTEMS    Your last dose was: Your next dose is:          2 L by Nasal route continuous. [facility]   2 L                 * predniSONE 20 mg tablet  Commonly known as:  [address] last dose was: Your next dose is: Take 20 mg by mouth daily (with breakfast). Prednisone taper: Take 60 mg (3 tablets) for two days. Then 40 mg (two tablets) for three days. Then 20 mg (one tablet) for three days. Then 10 mg (half of one tablet) for three days. Indications: COPD exacerbation   20 mg                 * predniSONE 10 mg tablet  Commonly known as:  [address] last dose was: Your next dose is:          1 tablet with breakfast every other morning                  roflumilast 250 mcg Tab  Commonly known as:  DALIRESP    Your last dose was: Your next dose is: Take 1 Tab by mouth daily. for four weeks then call MD for further direction. 1 Tab                 simethicone 80 mg chewable tablet  Commonly known as:  MYLICON    Your last dose was: Your next dose is: Take 80 mg by mouth every six (6) hours as needed for Flatulence. 80 mg                 SINGULAIR 10 mg tablet  Generic drug:  montelukast    Your last dose was: Your next dose is: Take 10 mg by mouth nightly.    10 mg SPIRIVA RESPIMAT 2.5 mcg/actuation inhaler  Generic drug:  tiotropium bromide    Your last dose was: Your next dose is: Take 2 Puffs by inhalation daily. 2 Puff                     * This list has 4 medication(s) that are the same as other medications prescribed for you. Read the directions carefully, and ask your doctor or other care provider to review them with you.                   Regulo Selby MD, PGY-2  [facility]  [facility]  May 9, 2019

## 2019-05-09 NOTE — DISCHARGE INSTRUCTIONS
Patient Education        COPD Exacerbation Plan: Care Instructions  Your Care Instructions    If you have chronic obstructive pulmonary disease (COPD), your usual shortness of breath could suddenly get worse. You may start coughing more and have more mucus. This flare-up is called a COPD exacerbation (say \"[id number]\"). A lung infection or air pollution could set off an exacerbation. Sometimes it can happen after a quick change in temperature or being around chemicals. Work with your doctor to make a plan for dealing with an exacerbation. You can better manage it if you plan ahead. Follow-up care is a key part of your treatment and safety. Be sure to make and go to all appointments, and call your doctor if you are having problems. It's also a good idea to know your test results and keep a list of the medicines you take. How can you care for yourself at home? During an exacerbation  · Do not panic if you start to have one. Quick treatment at home may help you prevent serious breathing problems. If you have a COPD exacerbation plan that you developed with your doctor, follow it. · Take your medicines exactly as your doctor tells you.  ? Use your inhaler as directed by your doctor. If your symptoms do not get better after you use your medicine, have someone take you to the emergency room. Call an ambulance if necessary. ? With inhaled medicines, a spacer or a nebulizer may help you get more medicine to your lungs. Ask your doctor or pharmacist how to use them properly. Practice using the spacer in front of a mirror before you have an exacerbation. This may help you get the medicine into your lungs quickly. ? If your doctor has given you steroid pills, take them as directed. ? Your doctor may have given you a prescription for antibiotics, which you can fill if you need it. ? Talk to your doctor if you have any problems with your medicine.  And call your doctor if you have to use your antibiotic or steroid pills. Preventing an exacerbation  · Do not smoke. This is the most important step you can take to prevent more damage to your lungs and prevent problems. If you already smoke, it is never too late to stop. If you need help quitting, talk to your doctor about stop-smoking programs and medicines. These can increase your chances of quitting for good. · Take your daily medicines as prescribed. · Avoid colds and flu. ? Get a pneumococcal vaccine. ? Get a flu vaccine each year, as soon as it is available. Ask those you live or work with to do the same, so they will not get the flu and infect you. ? Try to stay away from people with colds or the flu. ? Wash your hands often. · Avoid secondhand smoke; air pollution; cold, dry air; hot, humid air; and high altitudes. Stay at home with your windows closed when air pollution is bad. · Learn breathing techniques for COPD, such as breathing through pursed lips. These techniques can help you breathe easier during an exacerbation. When should you call for help? Call 911 anytime you think you may need emergency care. For example, call if:    · You have severe trouble breathing.     · You have severe chest pain.    Call your doctor now or seek immediate medical care if:    · You have new or worse shortness of breath.     · You develop new chest pain.     · You are coughing more deeply or more often, especially if you notice more mucus or a change in the color of your mucus.     · You cough up blood.     · You have new or increased swelling in your legs or belly.     · You have a fever.    Watch closely for changes in your health, and be sure to contact your doctor if:    · You need to use your antibiotic or steroid pills.     · Your symptoms are getting worse. Where can you learn more? Go to http://etelvina-olivier.info/. Enter J319 in the search box to learn more about \"COPD Exacerbation Plan: Care Instructions. \"  Current as of: September 5, 2018  Content Version: 11.9  © 5038-7200 [employer]. Care instructions adapted under license by [employer] (which disclaims liability or warranty for this information). If you have questions about a medical condition or this instruction, always ask your healthcare professional. [address] any warranty or liability for your use of this information.

## 2019-05-09 NOTE — ED TRIAGE NOTES
Received pt from ems, per pt she has been having trouble breathing past couple days more than normal. On 2L of oxygen at home

## 2019-05-09 NOTE — ED NOTES
Ambulated with patient in room and to bathroom.  Pt tolerated well and oxygen saturation remained steady

## 2019-05-10 PROCEDURE — 3331090002 HH PPS REVENUE DEBIT

## 2019-05-10 PROCEDURE — 3331090001 HH PPS REVENUE CREDIT

## 2019-05-11 PROCEDURE — 3331090001 HH PPS REVENUE CREDIT

## 2019-05-11 PROCEDURE — 3331090002 HH PPS REVENUE DEBIT

## 2019-05-12 PROCEDURE — 3331090001 HH PPS REVENUE CREDIT

## 2019-05-12 PROCEDURE — 3331090002 HH PPS REVENUE DEBIT

## 2019-05-13 PROCEDURE — 3331090001 HH PPS REVENUE CREDIT

## 2019-05-13 PROCEDURE — 3331090002 HH PPS REVENUE DEBIT

## 2019-05-14 PROCEDURE — 3331090002 HH PPS REVENUE DEBIT

## 2019-05-14 PROCEDURE — 3331090001 HH PPS REVENUE CREDIT

## 2019-05-15 PROCEDURE — 3331090001 HH PPS REVENUE CREDIT

## 2019-05-15 PROCEDURE — 3331090002 HH PPS REVENUE DEBIT

## 2019-05-16 ENCOUNTER — TELEPHONE (OUTPATIENT)
Dept: PULMONOLOGY | Age: 59
End: 2019-05-16

## 2019-05-16 PROCEDURE — 3331090002 HH PPS REVENUE DEBIT

## 2019-05-16 PROCEDURE — 3331090001 HH PPS REVENUE CREDIT

## 2019-05-16 NOTE — TELEPHONE ENCOUNTER
[facility] [phone]. NEEDS ORDER FOR NEBULIZER MEDS FAXED TO THEM [id number]. PATIENT WANTS TO GET MEDS THROUGH THEM.

## 2019-05-17 PROCEDURE — 3331090001 HH PPS REVENUE CREDIT

## 2019-05-17 PROCEDURE — 3331090002 HH PPS REVENUE DEBIT

## 2019-05-17 NOTE — TELEPHONE ENCOUNTER
Per [facility] the pt was d/c hospital on Albuterol. She filled at local pharmacy and has medication. They talked with pt and advised they could supply medication and she advised them per [facility] she was next fill with them.  Pt has appt 5/28, pt to talk with Dr. Alvarez Aguilera at visit and advise him where she would like rx sent since he did not prescribe at hospital d/c and he can do next rx

## 2019-05-18 PROCEDURE — 3331090002 HH PPS REVENUE DEBIT

## 2019-05-18 PROCEDURE — 3331090001 HH PPS REVENUE CREDIT

## 2019-05-19 PROCEDURE — 3331090002 HH PPS REVENUE DEBIT

## 2019-05-19 PROCEDURE — 3331090001 HH PPS REVENUE CREDIT

## 2019-05-20 PROCEDURE — 3331090002 HH PPS REVENUE DEBIT

## 2019-05-20 PROCEDURE — 3331090001 HH PPS REVENUE CREDIT

## 2019-05-21 PROCEDURE — 3331090001 HH PPS REVENUE CREDIT

## 2019-05-21 PROCEDURE — 3331090002 HH PPS REVENUE DEBIT

## 2019-05-22 PROCEDURE — 3331090001 HH PPS REVENUE CREDIT

## 2019-05-22 PROCEDURE — 3331090002 HH PPS REVENUE DEBIT

## 2019-05-23 ENCOUNTER — HOME CARE VISIT (OUTPATIENT)
Dept: HOME HEALTH SERVICES | Facility: HOME HEALTH | Age: 59
End: 2019-05-23
Payer: MEDICARE

## 2019-05-23 PROCEDURE — 3331090002 HH PPS REVENUE DEBIT

## 2019-05-23 PROCEDURE — 3331090001 HH PPS REVENUE CREDIT

## 2019-05-23 PROCEDURE — 3331090003 HH PPS REVENUE ADJ

## 2019-05-27 ENCOUNTER — HOSPITAL ENCOUNTER (INPATIENT)
Age: 59
LOS: 4 days | Discharge: HOME HEALTH | DRG: 191 | End: 2019-05-31
Attending: EMERGENCY MEDICINE | Admitting: FAMILY MEDICINE
Payer: MEDICARE

## 2019-05-27 ENCOUNTER — APPOINTMENT (OUTPATIENT)
Dept: GENERAL RADIOLOGY | Age: 59
DRG: 191 | End: 2019-05-27
Attending: EMERGENCY MEDICINE
Payer: MEDICARE

## 2019-05-27 DIAGNOSIS — J44.1 COPD EXACERBATION (HCC): Primary | ICD-10-CM

## 2019-05-27 LAB
ALBUMIN SERPL-MCNC: 3.9 G/DL (ref 3.4–5)
ALBUMIN/GLOB SERPL: 1 {RATIO} (ref 0.8–1.7)
ALP SERPL-CCNC: 87 U/L (ref 45–117)
ALT SERPL-CCNC: 46 U/L (ref 13–56)
ANION GAP SERPL CALC-SCNC: 8 MMOL/L (ref 3–18)
ARTERIAL PATENCY WRIST A: YES
AST SERPL-CCNC: 20 U/L (ref 15–37)
BASE DEFICIT BLD-SCNC: 2 MMOL/L
BASOPHILS # BLD: 0 K/UL (ref 0–0.1)
BASOPHILS NFR BLD: 0 % (ref 0–2)
BDY SITE: ABNORMAL
BILIRUB SERPL-MCNC: 0.5 MG/DL (ref 0.2–1)
BNP SERPL-MCNC: 55 PG/ML (ref 0–900)
BUN SERPL-MCNC: 12 MG/DL (ref 7–18)
BUN/CREAT SERPL: 13 (ref 12–20)
CALCIUM SERPL-MCNC: 9.6 MG/DL (ref 8.5–10.1)
CHLORIDE SERPL-SCNC: 104 MMOL/L (ref 100–108)
CK MB CFR SERPL CALC: 1.4 % (ref 0–4)
CK MB SERPL-MCNC: 1.2 NG/ML (ref 5–25)
CK SERPL-CCNC: 87 U/L (ref 26–192)
CO2 SERPL-SCNC: 27 MMOL/L (ref 21–32)
CREAT SERPL-MCNC: 0.95 MG/DL (ref 0.6–1.3)
DIFFERENTIAL METHOD BLD: ABNORMAL
EOSINOPHIL # BLD: 0.1 K/UL (ref 0–0.4)
EOSINOPHIL NFR BLD: 1 % (ref 0–5)
ERYTHROCYTE [DISTWIDTH] IN BLOOD BY AUTOMATED COUNT: 13.6 % (ref 11.6–14.5)
GAS FLOW.O2 O2 DELIVERY SYS: ABNORMAL L/MIN
GAS FLOW.O2 SETTING OXYMISER: 2 L/M
GLOBULIN SER CALC-MCNC: 3.8 G/DL (ref 2–4)
GLUCOSE SERPL-MCNC: 228 MG/DL (ref 74–99)
HCO3 BLD-SCNC: 23.7 MMOL/L (ref 22–26)
HCT VFR BLD AUTO: 39.4 % (ref 35–45)
HGB BLD-MCNC: 13.5 G/DL (ref 12–16)
LYMPHOCYTES # BLD: 1 K/UL (ref 0.9–3.6)
LYMPHOCYTES NFR BLD: 16 % (ref 21–52)
MCH RBC QN AUTO: 29.9 PG (ref 24–34)
MCHC RBC AUTO-ENTMCNC: 34.3 G/DL (ref 31–37)
MCV RBC AUTO: 87.4 FL (ref 74–97)
MONOCYTES # BLD: 0.1 K/UL (ref 0.05–1.2)
MONOCYTES NFR BLD: 2 % (ref 3–10)
NEUTS SEG # BLD: 4.7 K/UL (ref 1.8–8)
NEUTS SEG NFR BLD: 81 % (ref 40–73)
O2/TOTAL GAS SETTING VFR VENT: 28 %
PCO2 BLD: 43.4 MMHG (ref 35–45)
PH BLD: 7.34 [PH] (ref 7.35–7.45)
PLATELET # BLD AUTO: 268 K/UL (ref 135–420)
PMV BLD AUTO: 8.9 FL (ref 9.2–11.8)
PO2 BLD: 82 MMHG (ref 80–100)
POTASSIUM SERPL-SCNC: 4.3 MMOL/L (ref 3.5–5.5)
PROT SERPL-MCNC: 7.7 G/DL (ref 6.4–8.2)
RBC # BLD AUTO: 4.51 M/UL (ref 4.2–5.3)
SAO2 % BLD: 95 % (ref 92–97)
SERVICE CMNT-IMP: ABNORMAL
SODIUM SERPL-SCNC: 139 MMOL/L (ref 136–145)
SPECIMEN TYPE: ABNORMAL
TOTAL RESP. RATE, ITRR: 20
TROPONIN I SERPL-MCNC: <0.02 NG/ML (ref 0–0.04)
WBC # BLD AUTO: 5.9 K/UL (ref 4.6–13.2)

## 2019-05-27 PROCEDURE — 94640 AIRWAY INHALATION TREATMENT: CPT

## 2019-05-27 PROCEDURE — A9270 NON-COVERED ITEM OR SERVICE: HCPCS | Performed by: EMERGENCY MEDICINE

## 2019-05-27 PROCEDURE — 65660000004 HC RM CVT STEPDOWN

## 2019-05-27 PROCEDURE — 36600 WITHDRAWAL OF ARTERIAL BLOOD: CPT

## 2019-05-27 PROCEDURE — 74011000250 HC RX REV CODE- 250: Performed by: EMERGENCY MEDICINE

## 2019-05-27 PROCEDURE — 74011636637 HC RX REV CODE- 636/637: Performed by: EMERGENCY MEDICINE

## 2019-05-27 PROCEDURE — 80053 COMPREHEN METABOLIC PANEL: CPT

## 2019-05-27 PROCEDURE — 94660 CPAP INITIATION&MGMT: CPT

## 2019-05-27 PROCEDURE — 83880 ASSAY OF NATRIURETIC PEPTIDE: CPT

## 2019-05-27 PROCEDURE — 74011250636 HC RX REV CODE- 250/636: Performed by: EMERGENCY MEDICINE

## 2019-05-27 PROCEDURE — 99285 EMERGENCY DEPT VISIT HI MDM: CPT

## 2019-05-27 PROCEDURE — 77030029684 HC NEB SM VOL KT MONA -A

## 2019-05-27 PROCEDURE — 82550 ASSAY OF CK (CPK): CPT

## 2019-05-27 PROCEDURE — 96375 TX/PRO/DX INJ NEW DRUG ADDON: CPT

## 2019-05-27 PROCEDURE — 71045 X-RAY EXAM CHEST 1 VIEW: CPT

## 2019-05-27 PROCEDURE — 94761 N-INVAS EAR/PLS OXIMETRY MLT: CPT

## 2019-05-27 PROCEDURE — 82803 BLOOD GASES ANY COMBINATION: CPT

## 2019-05-27 PROCEDURE — 96374 THER/PROPH/DIAG INJ IV PUSH: CPT

## 2019-05-27 PROCEDURE — 85025 COMPLETE CBC W/AUTO DIFF WBC: CPT

## 2019-05-27 PROCEDURE — 93005 ELECTROCARDIOGRAM TRACING: CPT

## 2019-05-27 PROCEDURE — 5A09457 ASSISTANCE WITH RESPIRATORY VENTILATION, 24-96 CONSECUTIVE HOURS, CONTINUOUS POSITIVE AIRWAY PRESSURE: ICD-10-PCS | Performed by: FAMILY MEDICINE

## 2019-05-27 RX ORDER — PREDNISONE 20 MG/1
60 TABLET ORAL
Status: COMPLETED | OUTPATIENT
Start: 2019-05-27 | End: 2019-05-27

## 2019-05-27 RX ORDER — LEVOFLOXACIN 750 MG/1
750 TABLET ORAL EVERY 24 HOURS
Status: DISCONTINUED | OUTPATIENT
Start: 2019-05-27 | End: 2019-05-27

## 2019-05-27 RX ORDER — IPRATROPIUM BROMIDE AND ALBUTEROL SULFATE 2.5; .5 MG/3ML; MG/3ML
3 SOLUTION RESPIRATORY (INHALATION) ONCE
Status: COMPLETED | OUTPATIENT
Start: 2019-05-27 | End: 2019-05-27

## 2019-05-27 RX ORDER — BUDESONIDE AND FORMOTEROL FUMARATE DIHYDRATE 160; 4.5 UG/1; UG/1
2 AEROSOL RESPIRATORY (INHALATION)
Status: DISCONTINUED | OUTPATIENT
Start: 2019-05-28 | End: 2019-05-31 | Stop reason: HOSPADM

## 2019-05-27 RX ORDER — MONTELUKAST SODIUM 10 MG/1
10 TABLET ORAL
Status: DISCONTINUED | OUTPATIENT
Start: 2019-05-27 | End: 2019-05-31 | Stop reason: HOSPADM

## 2019-05-27 RX ORDER — FUROSEMIDE 10 MG/ML
40 INJECTION INTRAMUSCULAR; INTRAVENOUS
Status: COMPLETED | OUTPATIENT
Start: 2019-05-27 | End: 2019-05-27

## 2019-05-27 RX ORDER — LISINOPRIL 20 MG/1
20 TABLET ORAL 2 TIMES DAILY
Status: DISCONTINUED | OUTPATIENT
Start: 2019-05-28 | End: 2019-05-31 | Stop reason: HOSPADM

## 2019-05-27 RX ORDER — SIMETHICONE 80 MG
80 TABLET,CHEWABLE ORAL
Status: DISCONTINUED | OUTPATIENT
Start: 2019-05-27 | End: 2019-05-31 | Stop reason: HOSPADM

## 2019-05-27 RX ORDER — PANTOPRAZOLE SODIUM 40 MG/1
40 TABLET, DELAYED RELEASE ORAL
Status: DISCONTINUED | OUTPATIENT
Start: 2019-05-28 | End: 2019-05-31 | Stop reason: HOSPADM

## 2019-05-27 RX ORDER — ACETAMINOPHEN 325 MG/1
650 TABLET ORAL
Status: DISCONTINUED | OUTPATIENT
Start: 2019-05-27 | End: 2019-05-31 | Stop reason: HOSPADM

## 2019-05-27 RX ORDER — FAMOTIDINE 20 MG/1
20 TABLET, FILM COATED ORAL
Status: DISCONTINUED | OUTPATIENT
Start: 2019-05-27 | End: 2019-05-31 | Stop reason: HOSPADM

## 2019-05-27 RX ORDER — ENOXAPARIN SODIUM 100 MG/ML
40 INJECTION SUBCUTANEOUS EVERY 24 HOURS
Status: DISCONTINUED | OUTPATIENT
Start: 2019-05-28 | End: 2019-05-31 | Stop reason: HOSPADM

## 2019-05-27 RX ORDER — PREDNISONE 20 MG/1
40 TABLET ORAL
Status: DISCONTINUED | OUTPATIENT
Start: 2019-05-28 | End: 2019-05-27

## 2019-05-27 RX ORDER — IPRATROPIUM BROMIDE AND ALBUTEROL SULFATE 2.5; .5 MG/3ML; MG/3ML
3 SOLUTION RESPIRATORY (INHALATION)
Status: DISCONTINUED | OUTPATIENT
Start: 2019-05-28 | End: 2019-05-31 | Stop reason: HOSPADM

## 2019-05-27 RX ORDER — PREDNISONE 20 MG/1
40 TABLET ORAL
Status: COMPLETED | OUTPATIENT
Start: 2019-05-28 | End: 2019-05-31

## 2019-05-27 RX ORDER — MAGNESIUM SULFATE 100 %
4 CRYSTALS MISCELLANEOUS AS NEEDED
Status: DISCONTINUED | OUTPATIENT
Start: 2019-05-27 | End: 2019-05-31 | Stop reason: HOSPADM

## 2019-05-27 RX ORDER — HYDROCHLOROTHIAZIDE 25 MG/1
12.5 TABLET ORAL DAILY
Status: DISCONTINUED | OUTPATIENT
Start: 2019-05-28 | End: 2019-05-31 | Stop reason: HOSPADM

## 2019-05-27 RX ORDER — ASPIRIN 81 MG/1
81 TABLET ORAL DAILY
Status: DISCONTINUED | OUTPATIENT
Start: 2019-05-28 | End: 2019-05-31 | Stop reason: HOSPADM

## 2019-05-27 RX ORDER — ALBUTEROL SULFATE 0.83 MG/ML
5 SOLUTION RESPIRATORY (INHALATION)
Status: COMPLETED | OUTPATIENT
Start: 2019-05-27 | End: 2019-05-27

## 2019-05-27 RX ORDER — INSULIN GLARGINE 100 [IU]/ML
30 INJECTION, SOLUTION SUBCUTANEOUS
Status: DISCONTINUED | OUTPATIENT
Start: 2019-05-28 | End: 2019-05-27

## 2019-05-27 RX ORDER — DEXTROSE 50 % IN WATER (D50W) INTRAVENOUS SYRINGE
25-50 AS NEEDED
Status: DISCONTINUED | OUTPATIENT
Start: 2019-05-27 | End: 2019-05-31 | Stop reason: HOSPADM

## 2019-05-27 RX ORDER — ALBUTEROL SULFATE 0.83 MG/ML
2.5 SOLUTION RESPIRATORY (INHALATION)
Status: DISPENSED | OUTPATIENT
Start: 2019-05-27 | End: 2019-05-27

## 2019-05-27 RX ORDER — LEVOFLOXACIN 750 MG/1
750 TABLET ORAL EVERY 24 HOURS
Status: DISCONTINUED | OUTPATIENT
Start: 2019-05-27 | End: 2019-05-31 | Stop reason: HOSPADM

## 2019-05-27 RX ORDER — INSULIN GLARGINE 100 [IU]/ML
30 INJECTION, SOLUTION SUBCUTANEOUS
Status: DISCONTINUED | OUTPATIENT
Start: 2019-05-27 | End: 2019-05-28

## 2019-05-27 RX ORDER — INSULIN LISPRO 100 [IU]/ML
INJECTION, SOLUTION INTRAVENOUS; SUBCUTANEOUS
Status: DISCONTINUED | OUTPATIENT
Start: 2019-05-28 | End: 2019-05-31 | Stop reason: HOSPADM

## 2019-05-27 RX ORDER — FLUTICASONE PROPIONATE 50 MCG
2 SPRAY, SUSPENSION (ML) NASAL DAILY
Status: DISCONTINUED | OUTPATIENT
Start: 2019-05-28 | End: 2019-05-31 | Stop reason: HOSPADM

## 2019-05-27 RX ADMIN — ALBUTEROL SULFATE 5 MG: 2.5 SOLUTION RESPIRATORY (INHALATION) at 15:45

## 2019-05-27 RX ADMIN — ALBUTEROL SULFATE 2.5 MG: 2.5 SOLUTION RESPIRATORY (INHALATION) at 19:09

## 2019-05-27 RX ADMIN — PREDNISONE 60 MG: 20 TABLET ORAL at 15:45

## 2019-05-27 RX ADMIN — METHYLPREDNISOLONE SODIUM SUCCINATE 62.5 MG: 125 INJECTION, POWDER, FOR SOLUTION INTRAMUSCULAR; INTRAVENOUS at 19:09

## 2019-05-27 RX ADMIN — IPRATROPIUM BROMIDE AND ALBUTEROL SULFATE 3 ML: .5; 3 SOLUTION RESPIRATORY (INHALATION) at 15:45

## 2019-05-27 RX ADMIN — IPRATROPIUM BROMIDE AND ALBUTEROL SULFATE 3 ML: .5; 3 SOLUTION RESPIRATORY (INHALATION) at 19:48

## 2019-05-27 RX ADMIN — FUROSEMIDE 40 MG: 10 INJECTION, SOLUTION INTRAMUSCULAR; INTRAVENOUS at 19:50

## 2019-05-27 NOTE — ED TRIAGE NOTES
SOB progressively getting worse per patient. She is having difficulty speaking full sentences. Patient has a hx of COPD and Asthma.

## 2019-05-28 LAB
ANION GAP SERPL CALC-SCNC: 7 MMOL/L (ref 3–18)
ARTERIAL PATENCY WRIST A: NORMAL
ATRIAL RATE: 83 BPM
BASE EXCESS BLD CALC-SCNC: 0 MMOL/L
BASOPHILS # BLD: 0 K/UL (ref 0–0.1)
BASOPHILS NFR BLD: 0 % (ref 0–2)
BDY SITE: NORMAL
BUN SERPL-MCNC: 15 MG/DL (ref 7–18)
BUN/CREAT SERPL: 15 (ref 12–20)
CALCIUM SERPL-MCNC: 9.6 MG/DL (ref 8.5–10.1)
CALCULATED P AXIS, ECG09: 51 DEGREES
CALCULATED R AXIS, ECG10: 44 DEGREES
CALCULATED T AXIS, ECG11: 69 DEGREES
CHLORIDE SERPL-SCNC: 102 MMOL/L (ref 100–108)
CK MB CFR SERPL CALC: NORMAL % (ref 0–4)
CK MB CFR SERPL CALC: NORMAL % (ref 0–4)
CK MB SERPL-MCNC: <1 NG/ML (ref 5–25)
CK MB SERPL-MCNC: <1 NG/ML (ref 5–25)
CK SERPL-CCNC: 51 U/L (ref 26–192)
CK SERPL-CCNC: 72 U/L (ref 26–192)
CO2 SERPL-SCNC: 27 MMOL/L (ref 21–32)
CREAT SERPL-MCNC: 0.97 MG/DL (ref 0.6–1.3)
DIAGNOSIS, 93000: NORMAL
DIFFERENTIAL METHOD BLD: ABNORMAL
EOSINOPHIL # BLD: 0 K/UL (ref 0–0.4)
EOSINOPHIL NFR BLD: 0 % (ref 0–5)
ERYTHROCYTE [DISTWIDTH] IN BLOOD BY AUTOMATED COUNT: 13.5 % (ref 11.6–14.5)
EST. AVERAGE GLUCOSE BLD GHB EST-MCNC: 189 MG/DL
GAS FLOW.O2 O2 DELIVERY SYS: NORMAL L/MIN
GLUCOSE BLD STRIP.AUTO-MCNC: 285 MG/DL (ref 70–110)
GLUCOSE BLD STRIP.AUTO-MCNC: 314 MG/DL (ref 70–110)
GLUCOSE BLD STRIP.AUTO-MCNC: 327 MG/DL (ref 70–110)
GLUCOSE BLD STRIP.AUTO-MCNC: 331 MG/DL (ref 70–110)
GLUCOSE SERPL-MCNC: 313 MG/DL (ref 74–99)
HBA1C MFR BLD: 8.2 % (ref 4.2–5.6)
HCO3 BLD-SCNC: 24.3 MMOL/L (ref 22–26)
HCT VFR BLD AUTO: 38.4 % (ref 35–45)
HGB BLD-MCNC: 13.2 G/DL (ref 12–16)
LYMPHOCYTES # BLD: 0.7 K/UL (ref 0.9–3.6)
LYMPHOCYTES NFR BLD: 8 % (ref 21–52)
MCH RBC QN AUTO: 29.9 PG (ref 24–34)
MCHC RBC AUTO-ENTMCNC: 34.4 G/DL (ref 31–37)
MCV RBC AUTO: 87.1 FL (ref 74–97)
MONOCYTES # BLD: 0.1 K/UL (ref 0.05–1.2)
MONOCYTES NFR BLD: 1 % (ref 3–10)
NEUTS SEG # BLD: 7.5 K/UL (ref 1.8–8)
NEUTS SEG NFR BLD: 91 % (ref 40–73)
O2/TOTAL GAS SETTING VFR VENT: 21 %
P-R INTERVAL, ECG05: 148 MS
PCO2 BLD: 38.8 MMHG (ref 35–45)
PH BLD: 7.4 [PH] (ref 7.35–7.45)
PLATELET # BLD AUTO: 287 K/UL (ref 135–420)
PMV BLD AUTO: 9.2 FL (ref 9.2–11.8)
PO2 BLD: 84 MMHG (ref 80–100)
POTASSIUM SERPL-SCNC: 4.7 MMOL/L (ref 3.5–5.5)
Q-T INTERVAL, ECG07: 370 MS
QRS DURATION, ECG06: 84 MS
QTC CALCULATION (BEZET), ECG08: 434 MS
RBC # BLD AUTO: 4.41 M/UL (ref 4.2–5.3)
SAO2 % BLD: 96 % (ref 92–97)
SERVICE CMNT-IMP: NORMAL
SODIUM SERPL-SCNC: 136 MMOL/L (ref 136–145)
SPECIMEN TYPE: NORMAL
TOTAL RESP. RATE, ITRR: 19
TROPONIN I SERPL-MCNC: <0.02 NG/ML (ref 0–0.04)
TROPONIN I SERPL-MCNC: <0.02 NG/ML (ref 0–0.04)
VENTRICULAR RATE, ECG03: 83 BPM
WBC # BLD AUTO: 8.3 K/UL (ref 4.6–13.2)

## 2019-05-28 PROCEDURE — 80048 BASIC METABOLIC PNL TOTAL CA: CPT

## 2019-05-28 PROCEDURE — 74011250636 HC RX REV CODE- 250/636: Performed by: STUDENT IN AN ORGANIZED HEALTH CARE EDUCATION/TRAINING PROGRAM

## 2019-05-28 PROCEDURE — 94640 AIRWAY INHALATION TREATMENT: CPT

## 2019-05-28 PROCEDURE — 94660 CPAP INITIATION&MGMT: CPT

## 2019-05-28 PROCEDURE — 82803 BLOOD GASES ANY COMBINATION: CPT

## 2019-05-28 PROCEDURE — 82962 GLUCOSE BLOOD TEST: CPT

## 2019-05-28 PROCEDURE — 74011000250 HC RX REV CODE- 250: Performed by: STUDENT IN AN ORGANIZED HEALTH CARE EDUCATION/TRAINING PROGRAM

## 2019-05-28 PROCEDURE — 82550 ASSAY OF CK (CPK): CPT

## 2019-05-28 PROCEDURE — 36600 WITHDRAWAL OF ARTERIAL BLOOD: CPT

## 2019-05-28 PROCEDURE — 36415 COLL VENOUS BLD VENIPUNCTURE: CPT

## 2019-05-28 PROCEDURE — 83036 HEMOGLOBIN GLYCOSYLATED A1C: CPT

## 2019-05-28 PROCEDURE — 74011636637 HC RX REV CODE- 636/637: Performed by: STUDENT IN AN ORGANIZED HEALTH CARE EDUCATION/TRAINING PROGRAM

## 2019-05-28 PROCEDURE — 74011636637 HC RX REV CODE- 636/637: Performed by: FAMILY MEDICINE

## 2019-05-28 PROCEDURE — 85025 COMPLETE CBC W/AUTO DIFF WBC: CPT

## 2019-05-28 PROCEDURE — 65660000000 HC RM CCU STEPDOWN

## 2019-05-28 PROCEDURE — 74011250637 HC RX REV CODE- 250/637: Performed by: STUDENT IN AN ORGANIZED HEALTH CARE EDUCATION/TRAINING PROGRAM

## 2019-05-28 RX ORDER — INSULIN GLARGINE 100 [IU]/ML
15 INJECTION, SOLUTION SUBCUTANEOUS
Status: DISCONTINUED | OUTPATIENT
Start: 2019-05-28 | End: 2019-05-29

## 2019-05-28 RX ADMIN — BUDESONIDE AND FORMOTEROL FUMARATE DIHYDRATE 2 PUFF: 160; 4.5 AEROSOL RESPIRATORY (INHALATION) at 20:37

## 2019-05-28 RX ADMIN — ASPIRIN 81 MG: 81 TABLET, COATED ORAL at 08:15

## 2019-05-28 RX ADMIN — ENOXAPARIN SODIUM 40 MG: 40 INJECTION SUBCUTANEOUS at 23:45

## 2019-05-28 RX ADMIN — IPRATROPIUM BROMIDE AND ALBUTEROL SULFATE 3 ML: .5; 3 SOLUTION RESPIRATORY (INHALATION) at 20:37

## 2019-05-28 RX ADMIN — MONTELUKAST 10 MG: 10 TABLET, FILM COATED ORAL at 01:13

## 2019-05-28 RX ADMIN — LISINOPRIL 20 MG: 20 TABLET ORAL at 08:14

## 2019-05-28 RX ADMIN — INSULIN GLARGINE 15 UNITS: 100 INJECTION, SOLUTION SUBCUTANEOUS at 21:31

## 2019-05-28 RX ADMIN — INSULIN LISPRO 6 UNITS: 100 INJECTION, SOLUTION INTRAVENOUS; SUBCUTANEOUS at 08:25

## 2019-05-28 RX ADMIN — HYDROCHLOROTHIAZIDE 12.5 MG: 25 TABLET ORAL at 08:14

## 2019-05-28 RX ADMIN — IPRATROPIUM BROMIDE AND ALBUTEROL SULFATE 3 ML: .5; 3 SOLUTION RESPIRATORY (INHALATION) at 17:09

## 2019-05-28 RX ADMIN — IPRATROPIUM BROMIDE AND ALBUTEROL SULFATE 3 ML: .5; 3 SOLUTION RESPIRATORY (INHALATION) at 01:14

## 2019-05-28 RX ADMIN — IPRATROPIUM BROMIDE AND ALBUTEROL SULFATE 3 ML: .5; 3 SOLUTION RESPIRATORY (INHALATION) at 08:14

## 2019-05-28 RX ADMIN — ENOXAPARIN SODIUM 40 MG: 40 INJECTION SUBCUTANEOUS at 01:13

## 2019-05-28 RX ADMIN — LEVOFLOXACIN 750 MG: 750 TABLET, FILM COATED ORAL at 21:30

## 2019-05-28 RX ADMIN — INSULIN LISPRO 8 UNITS: 100 INJECTION, SOLUTION INTRAVENOUS; SUBCUTANEOUS at 21:30

## 2019-05-28 RX ADMIN — IPRATROPIUM BROMIDE AND ALBUTEROL SULFATE 3 ML: .5; 3 SOLUTION RESPIRATORY (INHALATION) at 13:57

## 2019-05-28 RX ADMIN — MONTELUKAST 10 MG: 10 TABLET, FILM COATED ORAL at 21:30

## 2019-05-28 RX ADMIN — INSULIN LISPRO 8 UNITS: 100 INJECTION, SOLUTION INTRAVENOUS; SUBCUTANEOUS at 17:12

## 2019-05-28 RX ADMIN — PREDNISONE 40 MG: 20 TABLET ORAL at 08:14

## 2019-05-28 RX ADMIN — INSULIN GLARGINE 30 UNITS: 100 INJECTION, SOLUTION SUBCUTANEOUS at 01:14

## 2019-05-28 RX ADMIN — PANTOPRAZOLE SODIUM 40 MG: 40 TABLET, DELAYED RELEASE ORAL at 08:15

## 2019-05-28 RX ADMIN — LEVOFLOXACIN 750 MG: 750 TABLET, FILM COATED ORAL at 04:54

## 2019-05-28 NOTE — ED NOTES
1900 PM :Pt care assumed from Dr. Keysha Izaguirre , ED provider. Pt complaint(s), current treatment plan, progression and available diagnostic results have been discussed thoroughly. Rounding occurred: yes  Intended Disposition: TBD   Pending diagnostic reports and/or labs (please list): pending re-evaluation    9:37 PM  On reevaluation, patient is received steroids and multiple nebulizer treatments, but still has significant wheezing throughout, slight increased work of breathing, and difficulty completing sentences. She is not hypoxic at this time, but seems that she has had very little improvement. I believe the patient would benefit from hospital admission for COPD exacerbation. No evidence of other acute etiology. Plan for admission to [facility] medicine.

## 2019-05-28 NOTE — H&P
Admission History and Physical  [facility]      Patient: Subha Hernandes MRN: [id number]  [facility]: [id number]    YOB: 1960  Age: 60 y.o. Sex: female      DOA: 5/27/2019       HPI:     Subha Hernandes is a 60 y.o. female with PMH COPD (on 2L O2 NC with activity), diastolic CHF, DMT2, HTN, GERD, allergic rhinitis and SHERRIE, now presenting with complaint of shortness of breath. Patient developed worsening productive cough two days ago with SOB and CHAUDHARY. She used albuterol nebulizer that only relieved her symptoms for one hour. Denies fever, chills, itchy red eyes or sick contacts. Patient endorses bilateral chest pain/tightness that worsens with cough and feels like someone is squeezing her ribs. Denies pain in left arm or neck. She noticed swelling in her feet, fatigue and worsening of symptoms with exertion. Patient reports that COPD can be triggered by infection, seasonal allergies and heat. Patient recently seen in ED on 5/9 for similar symptoms and was treated with doxycycline and prednisone. Patient had been taking controller medications daily and only briefly ran out of duoneb inhalers, which she restocked a week and a half ago. She is a former smoker. Lives with her daughter and grandchildren. Uses walker to walk longer distances. ED Course:    Albuterol nebs x2    Solumedrol 6-0 mg IV  Pred 60 mg po  Lasix 40 mg IV    Review of Systems - General ROS: negative for  - chills, fever  Psychological ROS: negative for - anxiety and depression  Ophthalmic ROS: +blurry vision, negative for - decreased vision or loss of vision  ENT ROS: +HA, negative for - hearing change or visual changes  Hematological and Lymphatic ROS: negative for - bruising, jaundice  Respiratory ROS: +productive cough, SOB, wheezing, negative for - hemoptysis, orthopnea, paroxysmal dyspnea  Cardiovascular ROS: +CHAUDHARY, +edema, +chest pain, negative for - loss of consciousness, or palpitations Gastrointestinal ROS: negative for - abdominal pain, blood in stools, change in stools, constipation, diarrhea, hematemesis, melena, nausea/vomiting or swallowing difficulty/pain  Genito-Urinary ROS: negative for - dysuria, hematuria or urinary frequency/urgency  Musculoskeletal ROS: negative for - joint pain, joint swelling or muscle pain  Neurological ROS: +HA, negative for - dizziness, numbness/tingling or weakness  Dermatological ROS: +dry skin      Past Medical History:   Diagnosis Date    Asthma     Chronic lung disease     COPD     Cystocele, midline     Diabetes mellitus ([facility] [address]     GERD (gastroesophageal reflux disease)     Hidradenitis suppurativa     Hyperlipidemia     Hypertension     SHERRIE on CPAP     CPAP    Stress incontinence        Past Surgical History:   Procedure Laterality Date    BREAST SURGERY PROCEDURE UNLISTED      Right breast benign tumor removal    HX APPENDECTOMY      HX CHOLECYSTECTOMY      HX DILATION AND CURETTAGE      Dysfunctional uterine bleeding, thought 2/2 fibroids    HX TUBAL LIGATION         Family History   Problem Relation Age of Onset    Hypertension Mother     Stroke Mother     Breast Cancer Mother         Bilateral mastectomies    Cancer Mother         ovarian and breast    Heart Failure Mother     Heart Attack Father         2011    Heart Surgery Father         CABG    Heart Failure Father     COPD Sister         Heavy smoker    Cancer Sister         ovarian    Heart Failure Sister     Lung Disease Sister     Asthma Child     Cancer Maternal Aunt         pancreatic    Cancer Maternal Grandfather         stomach       Social History     Socioeconomic History    Marital status: LEGALLY      Spouse name: Not on file    Number of children: Not on file    Years of education: Not on file    Highest education level: Not on file   Tobacco Use    Smoking status: Former Smoker     Packs/day: 1.00     Years: 30.00     Pack years: 30.00 Types: Cigarettes     Start date: 1966     Last attempt to quit: 2006     Years since quittin.7    Smokeless tobacco: Never Used    Tobacco comment: 1-1.5 packs per day   Substance and Sexual Activity    Alcohol use: No    Drug use: No    Sexual activity: Never       Allergies   Allergen Reactions    Ancef [Cefazolin] Hives    Contrast Agent [Iodine] Anaphylaxis, Shortness of Breath and Swelling     Needs pre-medication for IV contrast with Benadryl, Solu-Medrol    Fish Containing Products Anaphylaxis     Pt states she had a severe allergic reaction at 7 y/o.  Metformin Other (comments)     Abdominal pain, diarrhea.  Codeine Other (comments)     Altered mental status       Prior to Admission Medications   Prescriptions Last Dose Informant Patient Reported? Taking? NOVOLOG FLEXPEN U-100 INSULIN 100 unit/mL inpn  Self No No   Sig: Continue home Sliding scale insulin as prior to admission   Patient taking differently: 1 Units by SubCUTAneous route three (3) times daily. If BG <100=0u  101-150=5u  151-250=8u  251-300=12u  >300 call MD     OXYGEN-AIR DELIVERY SYSTEMS   Yes No   Si L by Nasal route continuous. [facility]   albuterol (PROVENTIL VENTOLIN) 2.5 mg /3 mL (0.083 %) nebulizer solution   No No   Sig: 3 mL by Nebulization route every four (4) hours as needed for Wheezing. Indications: BRONCHOSPASM PREVENTION, Chronic Obstructive Pulmonary Disease   albuterol sulfate 90 mcg/actuation aepb   No No   Sig: Take 1 Puff by inhalation every four (4) hours. albuterol-ipratropium (DUO-NEB) 2.5 mg-0.5 mg/3 ml nebu   No No   Sig: 3 mL by Nebulization route every six (6) hours as needed. Patient taking differently: 3 mL by Nebulization route every six (6) hours as needed for Cough. as needed for cough, dyspnea   aspirin delayed-release 81 mg tablet   Yes No   Sig: Take 81 mg by mouth daily. cpap machine kit   Yes No   Sig: by Does Not Apply route nightly.  M.E.D.   famotidine (PEPCID) 20 mg tablet   Yes No   Sig: Take 20 mg by mouth two (2) times daily as needed for Other (reflux). fluticasone propionate (FLONASE ALLERGY RELIEF) 50 mcg/actuation nasal spray   Yes No   Si Sprays by Both Nostrils route daily. fluticasone-salmeterol (ADVAIR HFA) 115-21 mcg/actuation inhaler   Yes No   Sig: Take 2 Puffs by inhalation two (2) times a day. hydroCHLOROthiazide (HYDRODIURIL) 12.5 mg tablet   Yes No   Sig: Take 12.5 mg by mouth daily. insulin glargine (LANTUS,BASAGLAR) 100 unit/mL (3 mL) inpn   No No   Si Units by SubCUTAneous route nightly. Patient taking differently: 30 Units by SubCUTAneous route nightly. levoFLOXacin (LEVAQUIN) 750 mg tablet   No No   Sig: Take 1 Tab by mouth every twenty-four (24) hours. Indications: COPD exacebation   lisinopril (PRINIVIL, ZESTRIL) 40 mg tablet   Yes No   Sig: Take 20 mg by mouth two (2) times a day. Indications: high blood pressure   mometasone/formoterol (DULERA IN)   Yes No   Sig: Take 1 Puff by inhalation daily. Indications: Bronchospasm Prevention with COPD, per pt   montelukast (SINGULAIR) 10 mg tablet   Yes No   Sig: Take 10 mg by mouth nightly. omeprazole (PRILOSEC) 40 mg capsule   Yes No   Sig: Take 40 mg by mouth daily. Indications: gastroesophageal reflux disease   predniSONE (DELTASONE) 10 mg tablet   No No   Si tablet with breakfast every other morning   predniSONE (DELTASONE) 20 mg tablet   No No   Sig: Take 20 mg by mouth daily (with breakfast). Prednisone taper: Take 60 mg (3 tablets) for two days. Then 40 mg (two tablets) for three days. Then 20 mg (one tablet) for three days. Then 10 mg (half of one tablet) for three days. Indications: COPD exacerbation   roflumilast (DALIRESP) 250 mcg tab   No No   Sig: Take 1 Tab by mouth daily. for four weeks then call MD for further direction. simethicone (MYLICON) 80 mg chewable tablet   Yes No   Sig: Take 80 mg by mouth every six (6) hours as needed for Flatulence.    tiotropium bromide (SPIRIVA RESPIMAT) 2.5 mcg/actuation inhaler   Yes No   Sig: Take 2 Puffs by inhalation daily. Facility-Administered Medications: None       Physical Exam:     Patient Vitals for the past 24 hrs:   Temp Pulse Resp BP SpO2   05/27/19 1950 -- 91 -- 161/81 --   05/27/19 1542 97.8 °F (36.6 °C) 87 19 (!) 175/93 94 %       Physical Exam:   General:  Alert and Responsive and in No acute distress. HEENT: Conjunctiva pink, sclera anicteric. EOMI. Pharynx moist, nonerythematous. Moist mucous membranes. Thyroid not enlarged, no nodules. No cervical, supraclavicular, occipital or submandibular lymphadenopathy. No other gross abnormalities present. CV:  RRR, no murmurs. No visible pulsations or thrills. RESP:  Speaks in short sentences. Bilateral scatted wheezes. Equal expansion bilaterally. ABD:  Soft, nontender, nondistended. Hernia. MS:  No joint deformity or instability. No atrophy. Neuro:  5/5 strength bilateral upper extremities and lower extremities. A+Ox3. Ext: +1 edema. 2+ radial and dp pulses bilaterally. Skin:  Excoriations along anterior tibia bilaterally. Good turgor. IMAGING:   Xr Chest Port    Result Date: 5/27/2019  No acute finding.          Recent Results (from the past 12 hour(s))   EKG, 12 LEAD, INITIAL    Collection Time: 05/27/19  4:00 PM   Result Value Ref Range    Ventricular Rate 83 BPM    Atrial Rate 83 BPM    P-R Interval 148 ms    QRS Duration 84 ms    Q-T Interval 370 ms    QTC Calculation (Bezet) 434 ms    Calculated P Axis 51 degrees    Calculated R Axis 44 degrees    Calculated T Axis 69 degrees    Diagnosis       Normal sinus rhythm  Nonspecific T wave abnormality  Abnormal ECG  When compared with ECG of 09-MAY-2019 13:45,  No significant change was found     CBC WITH AUTOMATED DIFF    Collection Time: 05/27/19  7:17 PM   Result Value Ref Range    WBC 5.9 4.6 - 13.2 K/uL    RBC 4.51 4.20 - 5.30 M/uL    HGB 13.5 12.0 - 16.0 g/dL    HCT 39.4 35.0 - 45.0 % MCV 87.4 74.0 - 97.0 FL    MCH 29.9 24.0 - 34.0 PG    MCHC 34.3 31.0 - 37.0 g/dL    RDW 13.6 11.6 - 14.5 %    PLATELET 460 837 - 998 K/uL    MPV 8.9 (L) 9.2 - 11.8 FL    NEUTROPHILS 81 (H) 40 - 73 %    LYMPHOCYTES 16 (L) 21 - 52 %    MONOCYTES 2 (L) 3 - 10 %    EOSINOPHILS 1 0 - 5 %    BASOPHILS 0 0 - 2 %    ABS. NEUTROPHILS 4.7 1.8 - 8.0 K/UL    ABS. LYMPHOCYTES 1.0 0.9 - 3.6 K/UL    ABS. MONOCYTES 0.1 0.05 - 1.2 K/UL    ABS. EOSINOPHILS 0.1 0.0 - 0.4 K/UL    ABS. BASOPHILS 0.0 0.0 - 0.1 K/UL    DF AUTOMATED     METABOLIC PANEL, COMPREHENSIVE    Collection Time: 05/27/19  7:17 PM   Result Value Ref Range    Sodium 139 136 - 145 mmol/L    Potassium 4.3 3.5 - 5.5 mmol/L    Chloride 104 100 - 108 mmol/L    CO2 27 21 - 32 mmol/L    Anion gap 8 3.0 - 18 mmol/L    Glucose 228 (H) 74 - 99 mg/dL    BUN 12 7.0 - 18 MG/DL    Creatinine 0.95 0.6 - 1.3 MG/DL    BUN/Creatinine ratio 13 12 - 20      GFR est AA >60 >60 ml/min/1.73m2    GFR est non-AA >60 >60 ml/min/1.73m2    Calcium 9.6 8.5 - 10.1 MG/DL    Bilirubin, total 0.5 0.2 - 1.0 MG/DL    ALT (SGPT) 46 13 - 56 U/L    AST (SGOT) 20 15 - 37 U/L    Alk. phosphatase 87 45 - 117 U/L    Protein, total 7.7 6.4 - 8.2 g/dL    Albumin 3.9 3.4 - 5.0 g/dL    Globulin 3.8 2.0 - 4.0 g/dL    A-G Ratio 1.0 0.8 - 1.7     NT-PRO BNP    Collection Time: 05/27/19  7:17 PM   Result Value Ref Range    NT pro-BNP 55 0 - 900 PG/ML   CARDIAC PANEL,(CK, CKMB & TROPONIN)    Collection Time: 05/27/19  7:17 PM   Result Value Ref Range    CK 87 26 - 192 U/L    CK - MB 1.2 <3.6 ng/ml    CK-MB Index 1.4 0.0 - 4.0 %    Troponin-I, QT <0.02 0.0 - 0.045 NG/ML         Assessment/Plan:   60 y.o. female with PMH  COPD (on 2L O2 NC with activity), diastolic CHF, DMT2, HTN, GERD, allergic rhinitis and SHERRIE on CPAP, now admitted with COPD exacerbation. Acute on Chronic COPD exacerbation  Patient denies fever, chills, itchy eyes and sick contacts. COPD may have been triggered by change in season vs allergies. Patient has had difficulty using CPAP machine as symptoms worsened, recommend addressing concerns with sleep medicine physician Dr Flory Martel. Complains of chest pain, fatigue, leg edema and CHAUDHARY suspicious for ACS vs MI vs PE. Troponin, BNP and CXR nl. On home 2L oxygen for activity. Patient hospitalized five times in the past year and six ED visits. No hx of previous intubation. Patient did not improve after receiving steroids, albuterol nebs and lasix. - admit to stepdown  - BIPAP  - cbc, bmp daily  - trend cardiac enzymes  - duonebs q4h with RT  - levofloxacin 750 mg daily for five days  - sputum cx and gram stain  - supplemental oxygen as needed  - Goal SpO2 88-92%  - ABG in am  - prednisone 40 mg for five days  - continue singular 10 mg daily  - Hold advair 2 puffs BID  - Hold Spiriva 2 puffs daily  - Tylenol prn  - PT/OT/CM    Diastolic CHF with preserved EF, HTN  Stable on exam. ECHO 7/2018 EF 66-70% with no wma. - Continue aspirin 81 mg po daily  - Vital signs per unit routine  - Continue lisinopril 40 mg po daily  - Continue HCTZ 12.5 mg po daily     DMT2   BG on admission 228. Last HgbA1c 8 from 4/5/2019.   - Accuchecks achs  - SSI  - Continue home Lantus 30U qhs  - Consistent carbohydrate diet  - Hemoglobin A1c      H/O SHERRIE  - hold CPAP qhs  - BIPAP     GERD, Allergic rhinitis  - Hold home omeprazole 40 mg po daily   - Continue protonix 40 mg po daily  - Continue home famotidine 20 mg po bid prn   - Continue home simethicone prn   - Continue flonase    Diet: Diabetic  DVT Prophylaxis: Lovenox  Code Status: Full  Point of ContactGearold Phy Relationship: Daughter [phone]    Disposition and anticipated LOS: [id number] MD Kelli Chin  PGY-1  05/27/19 9:41 PM  Pager: [phone]         Senior Resident History and Physical  [facility]     HPI:      Maggie Zelaya is a 60 y.o. female with a PMH of  Severe COPD with Asthma Overlap Syndrome, HFpEF(GR1DD on 2016 Echo),HTN, and Diabetes  now admitted with complaint of  Shortness of breath for 2 days.        Patient endorses Progressive dyspnea one xertion for the klast 2 days with increased sputum that has changed in color. She denies fevers, hemoptysis, and  sick contacts. She endorses bilateral chest pressure along her rib cage that is worth with exertion. She denies LUE sxs, vision changes, neck pain ro focal deficits. Her chest pain pressure began with coughing and relieved with rest. She noted three days of fatigue and increased leg swelling. Notes complaint with taking all medications. Advair increased to 230/21 1 month ago agfter April 2019 Hospitalization. Notes unable to tolerate CPAP last night due to shortness of breath, but compliant otherwise.     HPI, ROS, PMH, PSH, Family Hx, Social Hx, home medications, and allergies as above in intern H&P. I agree with history as above. Additional comments to the subjective history include:     Physical Exam:      Visit Vitals  /81   Pulse 91   Temp 97.8 °F (36.6 °C)   Resp 19   Ht 5' 3\" (1.6 m)   Wt 114.8 kg (253 lb)   SpO2 94%   BMI 44.82 kg/m²         General:  WD, WN, Tachypnic in 20s, speaking in short words with icnreased with increased worth of breathing ad pursed lips  HEENT:  NCAT. Conjunctiva pink, sclera anicteric. PERRL. EOMI. Pharynx moist, nonerythematous. Moist mucous membranes. Thyroid not enlarged, no nodules. CV:  RRR, no mumurs. PMI not displaced. Barrel chest.  RESP:  Increased work of breathing,equal , but  shallow breaths with diffuse wheezes, pursed lips,   ABD:  Soft, nontender, nondistended. No hepatosplenomegaly. No suprapubic tenderness. No CVA tenderness. MS:  No joint deformity or instability. No atrophy. Neuro:  Moving all extremities. No fiacial assymetru  Sensation grossly intact. A+Ox3. Ext:  No edema. 2+ radial and dp pulses bilaterally. Skin:  No rashes, lesions, or ulcers.   Good turgor.     Recent Results          Recent Results (from the past 24 hour(s))   EKG, 12 LEAD, INITIAL     Collection Time: 05/27/19  4:00 PM   Result Value Ref Range     Ventricular Rate 83 BPM     Atrial Rate 83 BPM     P-R Interval 148 ms     QRS Duration 84 ms     Q-T Interval 370 ms     QTC Calculation (Bezet) 434 ms     Calculated P Axis 51 degrees     Calculated R Axis 44 degrees     Calculated T Axis 69 degrees     Diagnosis           Normal sinus rhythm  Nonspecific T wave abnormality  Abnormal ECG  When compared with ECG of 09-MAY-2019 13:45,  No significant change was found      CBC WITH AUTOMATED DIFF     Collection Time: 05/27/19  7:17 PM   Result Value Ref Range     WBC 5.9 4.6 - 13.2 K/uL     RBC 4.51 4.20 - 5.30 M/uL     HGB 13.5 12.0 - 16.0 g/dL     HCT 39.4 35.0 - 45.0 %     MCV 87.4 74.0 - 97.0 FL     MCH 29.9 24.0 - 34.0 PG     MCHC 34.3 31.0 - 37.0 g/dL     RDW 13.6 11.6 - 14.5 %     PLATELET 704 749 - 734 K/uL     MPV 8.9 (L) 9.2 - 11.8 FL     NEUTROPHILS 81 (H) 40 - 73 %     LYMPHOCYTES 16 (L) 21 - 52 %     MONOCYTES 2 (L) 3 - 10 %     EOSINOPHILS 1 0 - 5 %     BASOPHILS 0 0 - 2 %     ABS. NEUTROPHILS 4.7 1.8 - 8.0 K/UL     ABS. LYMPHOCYTES 1.0 0.9 - 3.6 K/UL     ABS. MONOCYTES 0.1 0.05 - 1.2 K/UL     ABS. EOSINOPHILS 0.1 0.0 - 0.4 K/UL     ABS. BASOPHILS 0.0 0.0 - 0.1 K/UL     DF AUTOMATED     METABOLIC PANEL, COMPREHENSIVE     Collection Time: 05/27/19  7:17 PM   Result Value Ref Range     Sodium 139 136 - 145 mmol/L     Potassium 4.3 3.5 - 5.5 mmol/L     Chloride 104 100 - 108 mmol/L     CO2 27 21 - 32 mmol/L     Anion gap 8 3.0 - 18 mmol/L     Glucose 228 (H) 74 - 99 mg/dL     BUN 12 7.0 - 18 MG/DL     Creatinine 0.95 0.6 - 1.3 MG/DL     BUN/Creatinine ratio 13 12 - 20       GFR est AA >60 >60 ml/min/1.73m2     GFR est non-AA >60 >60 ml/min/1.73m2     Calcium 9.6 8.5 - 10.1 MG/DL     Bilirubin, total 0.5 0.2 - 1.0 MG/DL     ALT (SGPT) 46 13 - 56 U/L     AST (SGOT) 20 15 - 37 U/L     Alk. phosphatase 87 45 - 117 U/L     Protein, total 7.7 6.4 - 8.2 g/dL     Albumin 3.9 3.4 - 5.0 g/dL     Globulin 3.8 2.0 - 4.0 g/dL     A-G Ratio 1.0 0.8 - 1.7     NT-PRO BNP     Collection Time: 05/27/19  7:17 PM   Result Value Ref Range     NT pro-BNP 55 0 - 900 PG/ML   CARDIAC PANEL,(CK, CKMB & TROPONIN)     Collection Time: 05/27/19  7:17 PM   Result Value Ref Range     CK 87 26 - 192 U/L     CK - MB 1.2 <3.6 ng/ml     CK-MB Index 1.4 0.0 - 4.0 %     Troponin-I, QT <0.02 0.0 - 0.045 NG/ML             CXR: poor penetration on study, but no acute abnormalities per radiology     EKG: NSR, no ischemia or heart block when compared to EKG of 5/9/2019      PFT per Pulmonary Progress Note JAN 2019:  Date FEV1/FVC FEV1%Best [id number]% FVC%best TLC% RV% VC% DLCO%   5/27/2016 54 1.32/51% 0.42/17% 2.54/77% 111 202 62 69                                .  Labs and imaging reviewed     Assessment/Plan:   60 y.o. female with a PMH ofSevere COPD with Asthma Overlap Syndrome, HFpEF(GR1DD on 2016 Echo),HTN, and Diabetes , now admitted with Acute Respiratory Distress.     Acute Respiratory Distress Due to COPD Exacerbation- GOLD C, Still Dypsnea  DDX: COPD Exacerbation(hx of 6 ED discharges in last year, 5 hospital admisisons in last year) vs. Unstable Angina(no EKG Changes, NEG troponinx1) vs. CHF Exacerbation(GR1DD on 2016 Echo, Sxs of elg swelling, HTN on arrival), BNP WNL) vs. Atypical PNA vs. PE.   PMH: Asthma-COPD Overlap Syndrome on 2l Home O2, SHERRIE on C-PAP  ED Course: S/p Abuterol x2, duonebx2, Prednisone 60, solumedrol 60, Lasix IV 40mg  Home regimen: Prednisone 10mg every other day, Advair 230, Spiriva 2.5 daily, Singulair   (dulera prescribed, but not covered by insurance)  2 days of increased shortness of breath  On exertion with increased sputum and cough. Absent hemoptysis. Hypertensive to 175/90s. Afebrile. Aki Dapper in 25s. HR>90. Pro-BNP:55. Trop Negx1.  1800 CXR: no acute findings and no volume overload.  EKG shows NSR with no ischemia when compared to 5/9/19 EKG, Do not suspect PE given b/l peluritc pain c/w with prior COPD exacerbations and dyspnea is not persistent.   Last Echo: July 2018: EF 66-70%,   -Admit to [facility] for continuous Bi-pap  -Continuous bi-pap per RT protocol  -Duonebx1  -ABG now and in AM  -Hold Spiriva  -Symbicort instead of advair per hospital forumary  -continue flonase and singulair  -vital signs per unit routine  -Serial troponins  -Start 5 days course of PO Prednisone, Day#2 tomorrow at 40mg  -Duoneb Q4 hour RT  -Levaquin 750mg daily for five days  -Reassess Lasix in morning, but BNP is not elevated and no signs of interstial disease  -Goal SPO2 88-92%  -sputum cx  -case management for dispo planning and outpatient prescription assistance  -Advance Care Planning as Outpatient give GOLD C (8.2% 3 year mortality)        *Please see intern note above for additional chronic disease mgmt.     Manuela South MD PGY-2  5/27/2019, 10:11 PM

## 2019-05-28 NOTE — ROUTINE PROCESS
Bedside and Verbal shift change report given to JOSÉ MIGUEL Carson (oncoming nurse) by Quan Soto RN (offgoing nurse). Report included the following information SBAR, Kardex and MAR.

## 2019-05-28 NOTE — ED NOTES
Patient back in room, reconnected back to cardiac monitor and placed back on 2 liters O2 via NC. Awaiting further orders from provider.

## 2019-05-28 NOTE — ED NOTES
Patient sitting up on side of bed, increased work of breathing noted. Audible wheezing noted. Patient states breathing treatments she received is noted helping, patient unable to speak in clear sentences. Will administer medication as ordered by provider.

## 2019-05-28 NOTE — ED NOTES
Patient placed on Bipap machine by respiratory therapist. Patient states she's feeling slightly better.

## 2019-05-28 NOTE — ED NOTES
Patient provided with healthy choice meal and water. Tolerating well. Patient denies any new complaints at this time.

## 2019-05-28 NOTE — ED NOTES
Patient ambulated to bathroom, refused to have purewick or bedpan. Steady gait noted. Awaiting return to room.

## 2019-05-28 NOTE — PROGRESS NOTES
Intern Progress Note  [facility]       Patient: Noa Choudhary MRN: [id number]  CSN: [id number]    YOB: 1960  Age: 60 y.o. Sex: female    DOA: 5/27/2019 LOS:  LOS: 1 day                    Subjective:     Acute events: No acute events since admission overnight. She is currently on BiPAP in the ER, awaiting a bed in stepdown. Her breathing has improved from initial presentation. ROS: no fevers or chills, +cough w/ SOB and some wheezing, no chest pain, no abdominal pain/nausea/vomiting/diarrhea    Objective:      Patient Vitals for the past 24 hrs:   Temp Pulse Resp BP SpO2   05/28/19 0830 -- 88 30 145/68 95 %   05/28/19 0815 -- 86 25 137/75 94 %   05/28/19 0800 -- 89 22 124/68 95 %   05/28/19 0745 97.8 °F (36.6 °C) 88 18 143/78 96 %   05/28/19 0615 -- 71 20 127/63 97 %   05/28/19 0545 -- 73 20 118/65 97 %   05/28/19 0515 -- 77 21 127/61 96 %   05/28/19 0500 -- 76 21 136/66 97 %   05/28/19 0445 -- 79 20 103/61 96 %   05/28/19 0415 -- 80 22 111/51 96 %   05/28/19 0345 -- 85 20 139/63 96 %   05/28/19 0300 -- 84 20 110/52 97 %   05/28/19 0245 -- 85 21 123/52 97 %   05/28/19 0230 -- 80 21 136/68 96 %   05/28/19 0215 -- 80 20 148/79 97 %   05/28/19 0130 -- 86 19 133/79 98 %   05/27/19 2215 97.7 °F (36.5 °C) 94 18 139/78 96 %   05/27/19 2145 -- 94 19 140/80 96 %   05/27/19 2100 -- 90 (!) 31 (!) 129/95 96 %   05/27/19 1950 -- 91 -- 161/81 --   05/27/19 1542 97.8 °F (36.6 °C) 87 19 (!) 175/93 94 %       No intake or output data in the 24 hours ending 05/28/19 1015    Physical Exam:  General:  Alert and Responsive, BiPAP in place. HEENT: Conjunctiva pink, sclera anicteric. EOMI. No cervical, supraclavicular, occipital or submandibular lymphadenopathy. No other gross abnormalities present. CV:  RRR, no murmurs. No visible pulsations or thrills. RESP:  Labored breathing with increased work of breathing. Lungs clear to auscultation. no wheeze, rales, or rhonchi.   Equal expansion bilaterally. ABD:  Soft, nontender, nondistended. No hepatosplenomegaly. No suprapubic tenderness. MS:  No joint deformity or instability. No atrophy. Neuro:  moving upper extremities and lower extremities. Answers questions appropriately. Ext:  No edema. 2+ radial and dp pulses bilaterally. Skin:  No rashes, lesions, or ulcers. Good turgor. Lab/Data Reviewed: All lab results for the last 24 hours reviewed. Scheduled Medications Reviewed:  Current Facility-Administered Medications   Medication Dose Route Frequency    enoxaparin (LOVENOX) injection 40 mg  40 mg SubCUTAneous Q24H    insulin lispro (HUMALOG) injection   SubCUTAneous AC&HS    albuterol-ipratropium (DUO-NEB) 2.5 MG-0.5 MG/3 ML  3 mL Nebulization Q4H RT    insulin glargine (LANTUS) injection 30 Units  30 Units SubCUTAneous QHS    aspirin delayed-release tablet 81 mg  81 mg Oral DAILY    fluticasone propionate (FLONASE) 50 mcg/actuation nasal spray 2 Spray  2 Spray Both Nostrils DAILY    hydroCHLOROthiazide (HYDRODIURIL) tablet 12.5 mg  12.5 mg Oral DAILY    lisinopril (PRINIVIL, ZESTRIL) tablet 20 mg  20 mg Oral BID    montelukast (SINGULAIR) tablet 10 mg  10 mg Oral QHS    pantoprazole (PROTONIX) tablet 40 mg  40 mg Oral ACB    budesonide-formoterol (SYMBICORT) 160-4.5 mcg/actuation HFA inhaler 2 Puff  2 Puff Inhalation BID RT    levoFLOXacin (LEVAQUIN) tablet 750 mg  750 mg Oral Q24H    predniSONE (DELTASONE) tablet 40 mg  40 mg Oral DAILY WITH BREAKFAST         Imaging, microbiology, and EKG/Telemetry:  Xr Chest Port    Result Date: 5/27/2019  No acute finding. Assessment/Plan   60 y.o. female with PMH  COPD (on 2L O2 NC with activity), diastolic CHF, DMT2, HTN, GERD, allergic rhinitis and SHERRIE on CPAP, now admitted with COPD exacerbation.     Acute on Chronic COPD exacerbation  COPD may have been triggered by change in season vs allergies.  Patient  had difficulty using CPAP machine at home, likely making her exacerbation worse. Troponin, BNP and CXR normal on admission. On home 2L oxygen for activity. Patient hospitalized five times in the past year and six ED visits. No hx of previous intubation. Patient did not improve after receiving steroids, albuterol nebs and lasix in the ER.   - BIPAP until repeat ABG  - F/U repeat ABG  - duonebs q4h with RT  - levofloxacin 750 mg daily for five days  - sputum cx and gram stain  - Goal SpO2 88-92%  - prednisone 40 mg for five days  - continue singular 10 mg daily  - Hold advair 2 puffs BID  - Hold Spiriva 2 puffs daily  - PT/OT/CM     Diastolic CHF with preserved EF, HTN  Stable on exam. ECHO 7/2018 EF 66-70% with no wma.   - Continue aspirin 81 mg po daily  - Vital signs per unit routine  - Continue lisinopril 40 mg po daily  - Continue HCTZ 12.5 mg po daily     DMT2   BG on admission 228. Last HgbA1c 8 from 4/5/2019, was 8.2 on admission.   - Accuchecks achs  - SSI  - Continue home Lantus 30U qhs  - Consistent carbohydrate diet      H/O SHERRIE  - hold CPAP qhs  - BIPAP     GERD, Allergic rhinitis  - Hold home omeprazole 40 mg po daily   - Continue protonix 40 mg po daily  - Continue home famotidine 20 mg po bid prn   - Continue home simethicone prn   - Continue flonase     Diet: Diabetic  DVT Prophylaxis: Lovenox  Code Status: Full  Point of Contact: Giuseppe Sewell [phone]     Disposition and anticipated LOS: Perla Wagner [address], [address] PGY-1  05/28/19 10:15 AM

## 2019-05-28 NOTE — ED NOTES
Patient resting comfortably on stretcher, chest rise and fall noted. Vital signs are stable. Will continue to closely monitor patient.

## 2019-05-28 NOTE — ED NOTES
Patient given 2 liters of O2 via NC for SOB and increased work of breathing. Patient states its helping her slightly with her breathing but not a lot.

## 2019-05-28 NOTE — ROUTINE PROCESS
TRANSFER - OUT REPORT:    Verbal report given to [facility] RN(name) on Chris Burroughs  being transferred to [address] [place] [address](unit) for routine progression of care       Report consisted of patients Situation, Background, Assessment and   Recommendations(SBAR). Information from the following report(s) SBAR was reviewed with the receiving nurse. Lines:   Peripheral IV 05/27/19 Right Hand (Active)   Site Assessment Clean, dry, & intact 5/27/2019  7:17 PM   Phlebitis Assessment 0 5/27/2019  7:17 PM   Infiltration Assessment 0 5/27/2019  7:17 PM   Dressing Status Clean, dry, & intact 5/27/2019  7:17 PM   Dressing Type Tape;Transparent 5/27/2019  7:17 PM   Hub Color/Line Status Blue;Flushed;Patent 5/27/2019  7:17 PM   Action Taken Blood drawn 5/27/2019  7:17 PM   Alcohol Cap Used No 5/27/2019  7:17 PM        Opportunity for questions and clarification was provided.       Patient transported with:   Monitor

## 2019-05-29 LAB
ANION GAP SERPL CALC-SCNC: 7 MMOL/L (ref 3–18)
BASOPHILS # BLD: 0 K/UL (ref 0–0.1)
BASOPHILS NFR BLD: 0 % (ref 0–2)
BUN SERPL-MCNC: 22 MG/DL (ref 7–18)
BUN/CREAT SERPL: 18 (ref 12–20)
CALCIUM SERPL-MCNC: 9.2 MG/DL (ref 8.5–10.1)
CHLORIDE SERPL-SCNC: 102 MMOL/L (ref 100–108)
CO2 SERPL-SCNC: 27 MMOL/L (ref 21–32)
CREAT SERPL-MCNC: 1.19 MG/DL (ref 0.6–1.3)
DIFFERENTIAL METHOD BLD: NORMAL
EOSINOPHIL # BLD: 0 K/UL (ref 0–0.4)
EOSINOPHIL NFR BLD: 0 % (ref 0–5)
ERYTHROCYTE [DISTWIDTH] IN BLOOD BY AUTOMATED COUNT: 13.9 % (ref 11.6–14.5)
GLUCOSE BLD STRIP.AUTO-MCNC: 224 MG/DL (ref 70–110)
GLUCOSE BLD STRIP.AUTO-MCNC: 277 MG/DL (ref 70–110)
GLUCOSE BLD STRIP.AUTO-MCNC: 336 MG/DL (ref 70–110)
GLUCOSE BLD STRIP.AUTO-MCNC: 439 MG/DL (ref 70–110)
GLUCOSE SERPL-MCNC: 249 MG/DL (ref 74–99)
HCT VFR BLD AUTO: 37.8 % (ref 35–45)
HGB BLD-MCNC: 12.9 G/DL (ref 12–16)
LYMPHOCYTES # BLD: 2.6 K/UL (ref 0.9–3.6)
LYMPHOCYTES NFR BLD: 24 % (ref 21–52)
MCH RBC QN AUTO: 30.4 PG (ref 24–34)
MCHC RBC AUTO-ENTMCNC: 34.1 G/DL (ref 31–37)
MCV RBC AUTO: 89.2 FL (ref 74–97)
MONOCYTES # BLD: 0.8 K/UL (ref 0.05–1.2)
MONOCYTES NFR BLD: 7 % (ref 3–10)
NEUTS SEG # BLD: 7.3 K/UL (ref 1.8–8)
NEUTS SEG NFR BLD: 69 % (ref 40–73)
PLATELET # BLD AUTO: 304 K/UL (ref 135–420)
PMV BLD AUTO: 9.3 FL (ref 9.2–11.8)
POTASSIUM SERPL-SCNC: 3.4 MMOL/L (ref 3.5–5.5)
POTASSIUM SERPL-SCNC: 5 MMOL/L (ref 3.5–5.5)
RBC # BLD AUTO: 4.24 M/UL (ref 4.2–5.3)
SODIUM SERPL-SCNC: 136 MMOL/L (ref 136–145)
WBC # BLD AUTO: 10.7 K/UL (ref 4.6–13.2)

## 2019-05-29 PROCEDURE — 84132 ASSAY OF SERUM POTASSIUM: CPT

## 2019-05-29 PROCEDURE — 94660 CPAP INITIATION&MGMT: CPT

## 2019-05-29 PROCEDURE — 97162 PT EVAL MOD COMPLEX 30 MIN: CPT

## 2019-05-29 PROCEDURE — 85025 COMPLETE CBC W/AUTO DIFF WBC: CPT

## 2019-05-29 PROCEDURE — 97530 THERAPEUTIC ACTIVITIES: CPT

## 2019-05-29 PROCEDURE — 74011250637 HC RX REV CODE- 250/637: Performed by: STUDENT IN AN ORGANIZED HEALTH CARE EDUCATION/TRAINING PROGRAM

## 2019-05-29 PROCEDURE — 65660000000 HC RM CCU STEPDOWN

## 2019-05-29 PROCEDURE — 77010033678 HC OXYGEN DAILY

## 2019-05-29 PROCEDURE — 74011636637 HC RX REV CODE- 636/637: Performed by: FAMILY MEDICINE

## 2019-05-29 PROCEDURE — 94640 AIRWAY INHALATION TREATMENT: CPT

## 2019-05-29 PROCEDURE — 97165 OT EVAL LOW COMPLEX 30 MIN: CPT

## 2019-05-29 PROCEDURE — 74011250636 HC RX REV CODE- 250/636: Performed by: STUDENT IN AN ORGANIZED HEALTH CARE EDUCATION/TRAINING PROGRAM

## 2019-05-29 PROCEDURE — 97116 GAIT TRAINING THERAPY: CPT

## 2019-05-29 PROCEDURE — 74011000250 HC RX REV CODE- 250: Performed by: STUDENT IN AN ORGANIZED HEALTH CARE EDUCATION/TRAINING PROGRAM

## 2019-05-29 PROCEDURE — 36415 COLL VENOUS BLD VENIPUNCTURE: CPT

## 2019-05-29 PROCEDURE — 82962 GLUCOSE BLOOD TEST: CPT

## 2019-05-29 PROCEDURE — 74011636637 HC RX REV CODE- 636/637: Performed by: STUDENT IN AN ORGANIZED HEALTH CARE EDUCATION/TRAINING PROGRAM

## 2019-05-29 RX ORDER — INSULIN GLARGINE 100 [IU]/ML
30 INJECTION, SOLUTION SUBCUTANEOUS
Status: DISCONTINUED | OUTPATIENT
Start: 2019-05-29 | End: 2019-05-31 | Stop reason: HOSPADM

## 2019-05-29 RX ORDER — POTASSIUM CHLORIDE 20 MEQ/1
20 TABLET, EXTENDED RELEASE ORAL
Status: COMPLETED | OUTPATIENT
Start: 2019-05-29 | End: 2019-05-29

## 2019-05-29 RX ADMIN — PREDNISONE 40 MG: 20 TABLET ORAL at 09:33

## 2019-05-29 RX ADMIN — POTASSIUM CHLORIDE 20 MEQ: 20 TABLET, EXTENDED RELEASE ORAL at 11:19

## 2019-05-29 RX ADMIN — FLUTICASONE PROPIONATE 2 SPRAY: 50 SPRAY, METERED NASAL at 11:19

## 2019-05-29 RX ADMIN — PANTOPRAZOLE SODIUM 40 MG: 40 TABLET, DELAYED RELEASE ORAL at 09:33

## 2019-05-29 RX ADMIN — ENOXAPARIN SODIUM 40 MG: 40 INJECTION SUBCUTANEOUS at 23:44

## 2019-05-29 RX ADMIN — IPRATROPIUM BROMIDE AND ALBUTEROL SULFATE 3 ML: .5; 3 SOLUTION RESPIRATORY (INHALATION) at 19:47

## 2019-05-29 RX ADMIN — LISINOPRIL 20 MG: 20 TABLET ORAL at 09:33

## 2019-05-29 RX ADMIN — IPRATROPIUM BROMIDE AND ALBUTEROL SULFATE 3 ML: .5; 3 SOLUTION RESPIRATORY (INHALATION) at 07:41

## 2019-05-29 RX ADMIN — INSULIN LISPRO 4 UNITS: 100 INJECTION, SOLUTION INTRAVENOUS; SUBCUTANEOUS at 09:36

## 2019-05-29 RX ADMIN — INSULIN LISPRO 15 UNITS: 100 INJECTION, SOLUTION INTRAVENOUS; SUBCUTANEOUS at 16:53

## 2019-05-29 RX ADMIN — INSULIN LISPRO 6 UNITS: 100 INJECTION, SOLUTION INTRAVENOUS; SUBCUTANEOUS at 21:44

## 2019-05-29 RX ADMIN — IPRATROPIUM BROMIDE AND ALBUTEROL SULFATE 3 ML: .5; 3 SOLUTION RESPIRATORY (INHALATION) at 12:43

## 2019-05-29 RX ADMIN — IPRATROPIUM BROMIDE AND ALBUTEROL SULFATE 3 ML: .5; 3 SOLUTION RESPIRATORY (INHALATION) at 00:30

## 2019-05-29 RX ADMIN — POTASSIUM CHLORIDE 20 MEQ: 20 TABLET, EXTENDED RELEASE ORAL at 09:33

## 2019-05-29 RX ADMIN — ASPIRIN 81 MG: 81 TABLET, COATED ORAL at 09:33

## 2019-05-29 RX ADMIN — LEVOFLOXACIN 750 MG: 750 TABLET, FILM COATED ORAL at 21:39

## 2019-05-29 RX ADMIN — INSULIN LISPRO 8 UNITS: 100 INJECTION, SOLUTION INTRAVENOUS; SUBCUTANEOUS at 13:06

## 2019-05-29 RX ADMIN — BUDESONIDE AND FORMOTEROL FUMARATE DIHYDRATE 2 PUFF: 160; 4.5 AEROSOL RESPIRATORY (INHALATION) at 07:41

## 2019-05-29 RX ADMIN — BUDESONIDE AND FORMOTEROL FUMARATE DIHYDRATE 2 PUFF: 160; 4.5 AEROSOL RESPIRATORY (INHALATION) at 19:47

## 2019-05-29 RX ADMIN — HYDROCHLOROTHIAZIDE 12.5 MG: 25 TABLET ORAL at 09:32

## 2019-05-29 RX ADMIN — INSULIN GLARGINE 30 UNITS: 100 INJECTION, SOLUTION SUBCUTANEOUS at 21:45

## 2019-05-29 RX ADMIN — POTASSIUM CHLORIDE 20 MEQ: 20 TABLET, EXTENDED RELEASE ORAL at 13:15

## 2019-05-29 RX ADMIN — IPRATROPIUM BROMIDE AND ALBUTEROL SULFATE 3 ML: .5; 3 SOLUTION RESPIRATORY (INHALATION) at 05:15

## 2019-05-29 RX ADMIN — LISINOPRIL 20 MG: 20 TABLET ORAL at 18:08

## 2019-05-29 RX ADMIN — MONTELUKAST 10 MG: 10 TABLET, FILM COATED ORAL at 21:39

## 2019-05-29 NOTE — PROGRESS NOTES
Problem: Self Care Deficits Care Plan (Adult)  Goal: *Acute Goals and Plan of Care (Insert Text)  Outcome: Resolved/Met   OCCUPATIONAL THERAPY EVALUATION/DISCHARGE    Patient: Brittany Galindo (45 y.o. female)  Date: 5/29/2019  Primary Diagnosis: COPD (chronic obstructive pulmonary disease) ([facility] [J44.9]  COPD exacerbation (HCC) [J44.1]  COPD exacerbation ([facility] [J44.1]        Precautions: N/A     PLOF: Pt reports she lives with her daughter and grandchildren and has a caregiver who comes to for 6 hours M-F. She was (I) with basic self-care/ADLs and during as much IADLs as possible PTA. ASSESSMENT AND RECOMMENDATIONS:  Based on the objective data described below, the patient presents she is able to perform most basic self-care/ADL tasks Mod(I) to (I). Pt is able to ambulate down the hallway on 2L O2, with SpO2 at 94-97% for 3 minutes, in preparation for kitchen mobility for cooking task when she returns home. Educated pt on the role of OT, evaluation process, energy conservation, pacing, and having a tub transfer bench for easier transition to/from shower and to decrease the risk of falls with pt demonstrating good understanding. The pt has supportive family members at home to assist her PRN. At the end of the session, pt left sitting up at the edge of the bed, call-bell in reach, with all needs met. Skilled occupational therapy is not indicated at this time. Discharge Recommendations: None  Further Equipment Recommendations for Discharge: Pt reports she has a shower chair at home, but could benefit from using a tub transfer bench for easier transition to/from shower and to decrease the risk of falls. SUBJECTIVE:   Patient stated ? i've been better. ?    OBJECTIVE DATA SUMMARY:     Past Medical History:   Diagnosis Date    Asthma     Chronic lung disease     COPD     Cystocele, midline     Diabetes mellitus ([facility]     GERD (gastroesophageal reflux disease)     Hidradenitis suppurativa Hyperlipidemia     Hypertension     SHERRIE on CPAP     CPAP    Stress incontinence      Past Surgical History:   Procedure Laterality Date    BREAST SURGERY PROCEDURE UNLISTED      Right breast benign tumor removal    HX APPENDECTOMY      HX CHOLECYSTECTOMY      HX DILATION AND CURETTAGE      Dysfunctional uterine bleeding, thought 2/2 fibroids    HX TUBAL LIGATION       Barriers to Learning/Limitations: None  Compensate with: visual, verbal, tactile, kinesthetic cues/model    Home Situation:   Home Situation  Home Environment: Apartment  One/Two Story Residence: Two story  Living Alone: No  Support Systems: Family member(s)  Patient Expects to be Discharged to[de-identified] Private residence  Current DME Used/Available at Home: Braden Canavan, rollator  ? Right hand dominant   ? Left hand dominant    Cognitive/Behavioral Status:  Neurologic State: Alert  Orientation Level: Appropriate for age;Oriented X4  Cognition: Follows commands  Safety/Judgement: Fall prevention; Awareness of environment    Skin: Visible skin appeared intact  Edema: None noted    Coordination: BUE  Coordination: Within functional limits  Fine Motor Skills-Upper: Left Intact; Right Intact    Gross Motor Skills-Upper: Left Intact; Right Intact    Balance:  Sitting: Intact  Standing: Intact; With support  Standing - Static: Good  Standing - Dynamic : Fair(+)    Strength: BUE  Strength:  Within functional limits    Tone & Sensation: BUE  Tone: Normal  Sensation: Intact    Range of Motion: BUE  AROM: Within functional limits    Functional Mobility and Transfers for ADLs:  Bed Mobility:  Pt sitting edge of bed upon arrival  Transfers:  Sit to Stand: Modified independent  Stand to Sit: Modified independent    ADL Assessment:  Feeding: Independent    Oral Facial Hygiene/Grooming: Independent    Bathing: Modified independent    Upper Body Dressing: Independent    Lower Body Dressing: Modified independent    Toileting: Modified independent    ADL Intervention:  Upper Body Dressing Assistance  Dressing Assistance: Modified independent  Hospital Gown: Modified independent    Lower Body Dressing Assistance  Dressing Assistance: Modified independent    Cognitive Retraining  Safety/Judgement: Fall prevention; Awareness of environment    Pain:  Pain level pre-treatment: 0/10   Pain level post-treatment: 0/10   Pain Intervention(s): Medication (see MAR); Rest, Ice, Repositioning  Response to intervention: Nurse notified, See doc flow    Activity Tolerance:   Good    Please refer to the flowsheet for vital signs taken during this treatment. After treatment:   ?  Patient left in no apparent distress sitting up in chair  ? Patient left in no apparent distress sitting edge of bed  ? Call bell left within reach  ? Nursing notified  ? Caregiver present  ? Bed alarm activated    COMMUNICATION/EDUCATION:   ?      Role of Occupational Therapy in the acute care setting  ? Home safety education was provided and the patient/caregiver indicated understanding. ? Patient/family have participated as able and agree with findings and recommendations. ?      Patient is unable to participate in plan of care at this time. Thank you for this referral.  Odalys Tobin, OT  Time Calculation: 23 mins      Eval Complexity: History: MEDIUM Complexity : Expanded review of history including physical, cognitive and psychosocial  history ; Examination: LOW Complexity : 1-3 performance deficits relating to physical, cognitive , or psychosocial skils that result in activity limitations and / or participation restrictions ;    Decision Making:LOW Complexity : No comorbidities that affect functional and no verbal or physical assistance needed to complete eval tasks

## 2019-05-29 NOTE — CDMP QUERY
Patient admitted with sepsis/copd, noted to have Asthma. If possible, please document in progress notes and d/c summary if you are evaluating and/or treating any of the following:  Asthma: Specify if Acute exacerbation or Acute status asthmatics  Severity: mild, moderate, severe.  Intrinsic (non-allergenic) or extrinsic (allergenic)  Intermittent or persistent  Please Clarify: if any obstructive component is present  Other Explanation of clinical findings  Clinically Undetermined (no explanation for clinical findings) The medical record reflects the following: 
     Risk Factors: 58yof c/o sob Clinical Indicators: sob, wheezing TX : neb. tx's,,prednisone,bipap Thank you . Abdullahi Bentley RN    [phone] ext

## 2019-05-29 NOTE — PROGRESS NOTES
conducted an initial consultation and Spiritual Assessment for Lenora Jacobs, who is a 60 y.o.,female. Patients Primary Language is: [place]. According to the patients EMR Sabianism Affiliation is: Alisa Walls.     The reason the Patient came to the hospital is:   Patient Active Problem List    Diagnosis Date Noted    Atelectasis of right lung 12/14/2018    Acute exacerbation of chronic obstructive pulmonary disease (COPD) ([address] 10/07/2018    Bilateral carotid bruits 07/30/2018    Palpitations 07/30/2018    Type 2 diabetes with nephropathy ([address] 05/30/2018    Hyperlipidemia 01/24/2018    COPD with acute bronchitis ([address] 33/27/5503    Diastolic CHF, chronic ([address] 02/09/2017    Diverticulosis 02/09/2017    COPD exacerbation ([address] 02/08/2017    Acute exacerbation of COPD with asthma ([address] 02/08/2017    Obesity, Class III, BMI 40-49.9 (morbid obesity) ([address] 08/09/2016    Chest pain 08/09/2016    Dyspnea 08/09/2016    COPD with acute exacerbation ([address] 05/24/2015    COPD (chronic obstructive pulmonary disease) (HCC) 03/27/2015    Allergic rhinitis 03/27/2015    Essential hypertension, benign 09/30/2012    Diabetes mellitus, type 2 ([address] 09/30/2012    Esophageal reflux 09/30/2012    SHERRIE on CPAP         The  provided the following Interventions:  Initiated a relationship of care and support. Provided information about Spiritual Care Services. Chart reviewed. The following outcomes where achieved:  Patient expressed gratitude for 's visit. Assessment:  Patient does not have any Episcopal/cultural needs that will affect patients preferences in health care. There are no spiritual or Episcopal issues which require intervention at this time. Plan:  Chaplains will continue to follow and will provide pastoral care on an as needed/requested basis.    recommends bedside caregivers page  on duty if patient shows signs of acute spiritual or emotional distress.     [address]  [phone])

## 2019-05-29 NOTE — PROGRESS NOTES
Medical Student Progress Note  [facility]       Patient: Fred Long MRN: [id number]  CSN: [id number]    YOB: 1960  Age: 60 y.o. Sex: female    DOA: 5/27/2019 LOS:  LOS: 2 days                    Subjective:     Acute events: No acute events overnight. Patient sitting up comfortably in bed enjoying breakfast. 2L O2 NC in place. Her only concern is that her BG has been running high in the setting of having her Lantus halved. Patient notes improvement in her symptoms, including work of breathing, wheezing, and cough. Michelle does not feel that she is back to her baseline, however. She would like to be able to walk to the bathroom without needing to sit down and rest before being discharged. ROS + for cough, SOB, wheezing, headache. Denies nausea, vomiting, chest pain, abdominal pain, dysuria, constipation, diarrhea, or weakness.      Objective:      Patient Vitals for the past 24 hrs:   Temp Pulse Resp BP SpO2   05/29/19 0600 96.6 °F (35.9 °C) 65 20 123/77 99 %   05/29/19 0515 -- -- -- -- 98 %   05/29/19 0237 96.9 °F (36.1 °C) 83 20 111/77 96 %   05/29/19 0031 -- -- -- -- 98 %   05/28/19 2200 96.9 °F (36.1 °C) 91 20 133/56 93 %   05/28/19 2041 -- -- -- -- 94 %   05/28/19 2039 -- -- -- -- 94 %   05/28/19 1809 96 °F (35.6 °C) 92 19 117/69 93 %   05/28/19 1715 97.9 °F (36.6 °C) 91 21 113/47 94 %   05/28/19 1711 -- -- -- 112/50 --   05/28/19 1707 -- 85 19 (!) 89/73 93 %   05/28/19 1630 -- 86 19 121/53 100 %   05/28/19 1615 -- 86 28 97/51 97 %   05/28/19 1600 -- 88 24 115/55 96 %   05/28/19 1545 -- 89 25 (!) 108/93 99 %   05/28/19 1530 -- 93 23 109/58 93 %   05/28/19 1515 -- 89 24 100/48 96 %   05/28/19 1500 -- 93 25 106/50 92 %   05/28/19 1445 -- 96 25 126/56 92 %   05/28/19 1430 -- 96 28 123/53 93 %   05/28/19 1415 -- 92 26 119/69 99 %   05/28/19 1400 -- 90 21 95/56 95 %   05/28/19 1345 -- 92 25 98/67 93 %   05/28/19 1330 -- 91 24 114/59 91 %   05/28/19 1315 -- 91 26 119/47 93 %   05/28/19 1300 -- 91 24 123/62 92 %   05/28/19 1245 -- 90 19 125/63 92 %   05/28/19 1230 -- 84 19 117/63 93 %   05/28/19 1215 -- 75 22 109/58 100 %   05/28/19 1200 -- 88 18 114/75 95 %   05/28/19 1145 -- 79 20 112/56 94 %   05/28/19 1130 -- 79 28 114/42 93 %   05/28/19 1115 -- 76 20 115/56 97 %   05/28/19 1100 -- 85 20 124/59 99 %   05/28/19 1045 -- 67 19 115/55 96 %   05/28/19 1030 -- -- -- 125/46 --   05/28/19 0830 -- 88 30 145/68 95 %   05/28/19 0815 -- 86 25 137/75 94 %   05/28/19 0800 -- 89 22 124/68 95 %   05/28/19 0745 97.8 °F (36.6 °C) 88 18 143/78 96 %         Intake/Output Summary (Last 24 hours) at 5/29/2019 0729  Last data filed at 5/29/2019 0604  Gross per 24 hour   Intake 240 ml   Output 1250 ml   Net -1010 ml       Physical Exam:   General:  Alert and Responsive and in No acute distress. HEENT: Conjunctiva pink, sclera anicteric. EOMI. Pharynx moist, nonerythematous. Moist mucous membranes. Thyroid not enlarged, no nodules. No cervical, supraclavicular, occipital or submandibular lymphadenopathy. No other gross abnormalities present. CV:  RRR, no murmurs. No visible pulsations or thrills. RESP:  Unlabored breathing on 2L NC. Bilateral inspiratory wheezes heard, most prominent in upper lobes. No rales, or rhonchi. Equal expansion bilaterally. ABD:  Soft, nontender, nondistended obese abdomen. Freely movable hernia around the umbilicus without erythema, warmth, or tenderness. No hepatosplenomegaly. No suprapubic tenderness. No CVA tenderness. RECTAL:  Defferred  MS:  No joint deformity or instability. No atrophy. Neuro:  5/5 strength bilateral upper extremities and lower extremities. A+Ox3. Ext:  No edema. 2+ radial and dp pulses bilaterally. Skin:  No rashes, lesions, or ulcers. Good turgor.     Lab/Data Reviewed:    Recent Results (from the past 24 hour(s))   GLUCOSE, POC    Collection Time: 05/28/19  4:07 PM   Result Value Ref Range    Glucose (POC) 331 (H) 70 - 110 mg/dL   POC G3    Collection Time: 05/28/19  4:49 PM   Result Value Ref Range    Device: ROOM AIR      FIO2 (POC) 21 %    pH (POC) 7.404 7.35 - 7.45      pCO2 (POC) 38.8 35.0 - 45.0 MMHG    pO2 (POC) 84 80 - 100 MMHG    HCO3 (POC) 24.3 22 - 26 MMOL/L    sO2 (POC) 96 92 - 97 %    Base excess (POC) 0 mmol/L    Allens test (POC) N/A      Total resp. rate 19      Site LEFT RADIAL      Specimen type (POC) ARTERIAL      Performed by [id number] PANEL,(CK, CKMB & TROPONIN)    Collection Time: 05/28/19  7:00 PM   Result Value Ref Range    CK 51 26 - 192 U/L    CK - MB <1.0 <3.6 ng/ml    CK-MB Index  0.0 - 4.0 %     CALCULATION NOT PERFORMED WHEN RESULT IS BELOW LINEAR LIMIT    Troponin-I, QT <0.02 0.0 - 0.045 NG/ML   GLUCOSE, POC    Collection Time: 05/28/19  9:02 PM   Result Value Ref Range    Glucose (POC) 327 (H) 70 - 994 mg/dL   METABOLIC PANEL, BASIC    Collection Time: 05/29/19  3:46 AM   Result Value Ref Range    Sodium 136 136 - 145 mmol/L    Potassium 3.4 (L) 3.5 - 5.5 mmol/L    Chloride 102 100 - 108 mmol/L    CO2 27 21 - 32 mmol/L    Anion gap 7 3.0 - 18 mmol/L    Glucose 249 (H) 74 - 99 mg/dL    BUN 22 (H) 7.0 - 18 MG/DL    Creatinine 1.19 0.6 - 1.3 MG/DL    BUN/Creatinine ratio 18 12 - 20      GFR est AA 56 (L) >60 ml/min/1.73m2    GFR est non-AA 46 (L) >60 ml/min/1.73m2    Calcium 9.2 8.5 - 10.1 MG/DL   CBC WITH AUTOMATED DIFF    Collection Time: 05/29/19  3:46 AM   Result Value Ref Range    WBC 10.7 4.6 - 13.2 K/uL    RBC 4.24 4.20 - 5.30 M/uL    HGB 12.9 12.0 - 16.0 g/dL    HCT 37.8 35.0 - 45.0 %    MCV 89.2 74.0 - 97.0 FL    MCH 30.4 24.0 - 34.0 PG    MCHC 34.1 31.0 - 37.0 g/dL    RDW 13.9 11.6 - 14.5 %    PLATELET 306 587 - 499 K/uL    MPV 9.3 9.2 - 11.8 FL    NEUTROPHILS 69 40 - 73 %    LYMPHOCYTES 24 21 - 52 %    MONOCYTES 7 3 - 10 %    EOSINOPHILS 0 0 - 5 %    BASOPHILS 0 0 - 2 %    ABS. NEUTROPHILS 7.3 1.8 - 8.0 K/UL    ABS. LYMPHOCYTES 2.6 0.9 - 3.6 K/UL    ABS. MONOCYTES 0.8 0.05 - 1.2 K/UL    ABS. EOSINOPHILS 0.0 0.0 - 0.4 K/UL    ABS. BASOPHILS 0.0 0.0 - 0.1 K/UL    DF AUTOMATED           Scheduled Medications Reviewed:  Current Facility-Administered Medications   Medication Dose Route Frequency    potassium chloride (K-DUR, KLOR-CON) SR tablet 20 mEq  20 mEq Oral Q2H    insulin glargine (LANTUS) injection 15 Units  15 Units SubCUTAneous QHS    enoxaparin (LOVENOX) injection 40 mg  40 mg SubCUTAneous Q24H    insulin lispro (HUMALOG) injection   SubCUTAneous AC&HS    albuterol-ipratropium (DUO-NEB) 2.5 MG-0.5 MG/3 ML  3 mL Nebulization Q4H RT    aspirin delayed-release tablet 81 mg  81 mg Oral DAILY    fluticasone propionate (FLONASE) 50 mcg/actuation nasal spray 2 Spray  2 Spray Both Nostrils DAILY    hydroCHLOROthiazide (HYDRODIURIL) tablet 12.5 mg  12.5 mg Oral DAILY    lisinopril (PRINIVIL, ZESTRIL) tablet 20 mg  20 mg Oral BID    montelukast (SINGULAIR) tablet 10 mg  10 mg Oral QHS    pantoprazole (PROTONIX) tablet 40 mg  40 mg Oral ACB    budesonide-formoterol (SYMBICORT) 160-4.5 mcg/actuation HFA inhaler 2 Puff  2 Puff Inhalation BID RT    levoFLOXacin (LEVAQUIN) tablet 750 mg  750 mg Oral Q24H    predniSONE (DELTASONE) tablet 40 mg  40 mg Oral DAILY WITH BREAKFAST         Imaging, microbiology, and EKG/Telemetry:  None    Assessment/Plan     Patient is a 59 yo female with PMH of COPD requiring 2L oxygen NC with activity, diastolic CHF, DMT2, HTN, GERD, allergic rhinitis, and SHERRIE on CPAP, who presents for management of COPD exacerbation. Acute on Chronic COPD exacerbation  COPD may have been triggered by change in season vs allergies. Patient  had difficulty using CPAP machine at home, likely making her exacerbation worse. Troponin, BNP and CXR normal on admission. On home 2L NC oxygen for activity. Patient hospitalized five times in the past year and six ED visits. No hx of previous intubation.  Patient did not improve after receiving steroids, albuterol nebs and lasix in the ER. Repeat ABG showed improvement in respiratory acidosis to 7.404, with an sO2 of 96% on 21%FiO2.  - Back on CPAP at night  -Continue 2L NC during the day  - duonebs q4h with RT  - levofloxacin 750 mg daily for five days (5/27-5/31)  - sputum cx and gram stain pending  - Goal SpO2 88-92%  - prednisone 40 mg for five days  - continue singular 10 mg daily  -continue Symbicort 160-4.5 mcg bid  - Hold advair 2 puffs BID  - Hold Spiriva 2 puffs daily  - PT/OT/CM     Diastolic CHF with preserved EF, HTN  Stable on exam. ECHO 7/2018 EF 66-70% with no wma. - Continue aspirin 81 mg po daily  - Vital signs per unit routine  - Continue lisinopril 40 mg po daily  - Continue HCTZ 12.5 mg po daily     DMT2   BG on admission 228 > 327 > 249. Last HgbA1c 8 from 4/5/2019, was 8.2 on admission.   - Accuchecks achs  - SSI  - Continue Lantus 15U qhs (may consider increasing dose)  - Consistent carbohydrate diet      H/O SHERRIE  -CPAP     GERD, Allergic rhinitis  - Hold home omeprazole 40 mg po daily   - Continue protonix 40 mg po daily  - Continue home famotidine 20 mg po bid prn   - Continue home simethicone prn   - Continue flonase        Diet: Diabetic   DVT Prophylaxis: Lovenox  Code Status: Full  Point of Contact: Sophie Hughes (Relationship: Daughter), [phone]    Disposition: likely discharge today or tomorrow    [address] [facility], Medical Student  05/29/19 7:29 AM  *ATTENTION:  This note has been created by a medical student for educational purposes only. Please do not refer to the content of this note for clinical decision-making, billing, or other purposes. Please see attending physicians note to obtain clinical information on this patient. *

## 2019-05-29 NOTE — PROGRESS NOTES
Assumed care; Pt sitting in bed; 2LNC on, baseline from home; no distress noted; Cpap machine at bedside; Pt denies pain; bed in low position; Side rails up x 3; Call light and personal belonging within reach. Will continue to monitor.

## 2019-05-29 NOTE — PROGRESS NOTES
Intern Progress Note  [facility]       Patient: Marizol Sanchez MRN: [id number]  CSN: [id number]    YOB: 1960  Age: 60 y.o. Sex: female    DOA: 5/27/2019 LOS:  LOS: 2 days                    Subjective:     Acute events: No acute events  overnight. She was on BiPAP overnight. She is no longer short of breath at rest, only if she gets up to walk to the bathroom.      ROS: no fevers or chills, +cough w/ SOB and some wheezing, no chest pain, no abdominal pain/nausea/vomiting/diarrhea    Objective:      Patient Vitals for the past 24 hrs:   Temp Pulse Resp BP SpO2   05/29/19 0600 96.6 °F (35.9 °C) 65 20 123/77 99 %   05/29/19 0515 -- -- -- -- 98 %   05/29/19 0237 96.9 °F (36.1 °C) 83 20 111/77 96 %   05/29/19 0031 -- -- -- -- 98 %   05/28/19 2200 96.9 °F (36.1 °C) 91 20 133/56 93 %   05/28/19 2041 -- -- -- -- 94 %   05/28/19 2039 -- -- -- -- 94 %   05/28/19 1809 96 °F (35.6 °C) 92 19 117/69 93 %   05/28/19 1715 97.9 °F (36.6 °C) 91 21 113/47 94 %   05/28/19 1711 -- -- -- 112/50 --   05/28/19 1707 -- 85 19 (!) 89/73 93 %   05/28/19 1630 -- 86 19 121/53 100 %   05/28/19 1615 -- 86 28 97/51 97 %   05/28/19 1600 -- 88 24 115/55 96 %   05/28/19 1545 -- 89 25 (!) 108/93 99 %   05/28/19 1530 -- 93 23 109/58 93 %   05/28/19 1515 -- 89 24 100/48 96 %   05/28/19 1500 -- 93 25 106/50 92 %   05/28/19 1445 -- 96 25 126/56 92 %   05/28/19 1430 -- 96 28 123/53 93 %   05/28/19 1415 -- 92 26 119/69 99 %   05/28/19 1400 -- 90 21 95/56 95 %   05/28/19 1345 -- 92 25 98/67 93 %   05/28/19 1330 -- 91 24 114/59 91 %   05/28/19 1315 -- 91 26 119/47 93 %   05/28/19 1300 -- 91 24 123/62 92 %   05/28/19 1245 -- 90 19 125/63 92 %   05/28/19 1230 -- 84 19 117/63 93 %   05/28/19 1215 -- 75 22 109/58 100 %   05/28/19 1200 -- 88 18 114/75 95 %   05/28/19 1145 -- 79 20 112/56 94 %   05/28/19 1130 -- 79 28 114/42 93 %   05/28/19 1115 -- 76 20 115/56 97 %   05/28/19 1100 -- 85 20 124/59 99 %   05/28/19 1045 -- 67 19 115/55 96 %   05/28/19 1030 -- -- -- 125/46 --         Intake/Output Summary (Last 24 hours) at 5/29/2019 0942  Last data filed at 5/29/2019 2945  Gross per 24 hour   Intake 480 ml   Output 1250 ml   Net -770 ml       Physical Exam:  General:  Alert and Responsive  HEENT: Conjunctiva pink, sclera anicteric. EOMI. No cervical, supraclavicular, occipital or submandibular lymphadenopathy. No other gross abnormalities present. CV:  RRR, no murmurs. No visible pulsations or thrills. RESP:  Expiratory wheezing present, no labored breathing. Lungs clear to auscultation. no wheeze, rales, or rhonchi. Equal expansion bilaterally. ABD:  Soft, nontender, nondistended. No hepatosplenomegaly. No suprapubic tenderness. MS:  No joint deformity or instability. No atrophy. Neuro:  moving upper extremities and lower extremities. Answers questions appropriately. Ext:  No edema. 2+ radial and dp pulses bilaterally. Skin:  No rashes, lesions, or ulcers. Good turgor. Lab/Data Reviewed: All lab results for the last 24 hours reviewed.      Scheduled Medications Reviewed:  Current Facility-Administered Medications   Medication Dose Route Frequency    potassium chloride (K-DUR, KLOR-CON) SR tablet 20 mEq  20 mEq Oral Q2H    insulin glargine (LANTUS) injection 15 Units  15 Units SubCUTAneous QHS    enoxaparin (LOVENOX) injection 40 mg  40 mg SubCUTAneous Q24H    insulin lispro (HUMALOG) injection   SubCUTAneous AC&HS    albuterol-ipratropium (DUO-NEB) 2.5 MG-0.5 MG/3 ML  3 mL Nebulization Q4H RT    aspirin delayed-release tablet 81 mg  81 mg Oral DAILY    fluticasone propionate (FLONASE) 50 mcg/actuation nasal spray 2 Spray  2 Spray Both Nostrils DAILY    hydroCHLOROthiazide (HYDRODIURIL) tablet 12.5 mg  12.5 mg Oral DAILY    lisinopril (PRINIVIL, ZESTRIL) tablet 20 mg  20 mg Oral BID    montelukast (SINGULAIR) tablet 10 mg  10 mg Oral QHS    pantoprazole (PROTONIX) tablet 40 mg  40 mg Oral ACB    budesonide-formoterol (SYMBICORT) 160-4.5 mcg/actuation HFA inhaler 2 Puff  2 Puff Inhalation BID RT    levoFLOXacin (LEVAQUIN) tablet 750 mg  750 mg Oral Q24H    predniSONE (DELTASONE) tablet 40 mg  40 mg Oral DAILY WITH BREAKFAST         Imaging, microbiology, and EKG/Telemetry:  Xr Chest Port    Result Date: 5/27/2019  No acute finding. Assessment/Plan   60 y.o. female with PMH  COPD (on 2L O2 NC with activity), diastolic CHF, DMT2, HTN, GERD, allergic rhinitis and SHERRIE on CPAP, now admitted with COPD exacerbation.     Acute on Chronic COPD exacerbation:  No history of asthma, it would be very unusual for this to be diagnosed as an adult after years of tobacco use. Improving now, on 2/5L of O2 on nasal cannula. On home 2L oxygen for activity. She is using the BiPAP qhs.   - duonebs q4h with RT  - levofloxacin 750 mg daily for five days  - Goal SpO2 88-92%  - prednisone 40 mg for five days  - continue singular 10 mg daily  - PT/OT/CM     Diastolic CHF with preserved EF, HTN  Stable on exam. ECHO 7/2018 EF 66-70% with no wma. - Continue aspirin 81 mg po daily  - Continue lisinopril 40 mg po daily  - Continue HCTZ 12.5 mg po daily     DMT2   BG on admission 228. Last HgbA1c 8 from 4/5/2019, was 8.2 on admission.  Home Lantus dose is 30U qhs.  - SSI  -  Lantus 15U qhs  - Consistent carbohydrate diet      H/O SHERRIE  - hold CPAP qhs  - BIPAP     GERD, Allergic rhinitis  - Hold home omeprazole 40 mg po daily   - Continue protonix 40 mg po daily  - Continue home famotidine 20 mg po bid prn   - Continue home simethicone prn   - Continue flonase     Diet: Diabetic  DVT Prophylaxis: Lovenox  Code Status: Full  Point of Contact: Maria Dolores Cueto [phone]     Disposition and anticipated LOS: Perla Marmolejo Bernard [address], [address] PGY-1  05/29/19 10:15 AM

## 2019-05-29 NOTE — PROGRESS NOTES
Bedside shift change report given to Rocco VALDEZ (oncoming nurse) by Claire Brown (offgoing nurse). Report included the following information SBAR, Kardex, Intake/Output and MAR.

## 2019-05-29 NOTE — DIABETES MGMT
NUTRITIONAL ASSESSMENT GLYCEMIC CONTROL/ PLAN OF CARE     Marquis Santiago           60 y.o.           5/27/2019                 1. COPD exacerbation (HCC)       INTERVENTIONS/PLAN:   Consider addition of prandial Lispro insulin, 3 units TID with meals    ASSESSMENT:   Pt is a 60year old female with a past medical history significant for COPD, CHF, type 2 diabetes, hypertension, and GERD who presented with sob. Pt reports glucose has been hard to control lately due to steroids. Blood glucose elevated above targets. Pt is receiving prednisone 40 mg daily. Lantus insulin increased back to 30 units nightly. Pt ate all of lunch meal. States she tries to eat healthy at home but isn't perfect. Pt is avoiding regular soda and sweetened beverages.      Diabetes Management:   Recent blood glucose:     5/28/2019 16:07 5/28/2019 21:02 5/29/2019 07:53 5/29/2019 11:26   331 (H) 327 (H) 224 (H) 336 (H)   Within target range (non-ICU: <140; ICU<180): [] Yes   [x]  No    Current Insulin regimen:   Lantus insulin 30 units every bedtime  Correctional Lispro insulin 4 times daily ACHS (normal sensitivity)  Home medication/insulin regimen:   lantus insulin 30 units nightly   Novolog insulin per sliding scale   HbA1c: 8.2%  Adequate glycemic control PTA:  [] Yes  [x] No     SUBJECTIVE/OBJECTIVE:   Information obtained from: patient, chart review      Diet: Diabetic consistent carbohydrate     Patient Vitals for the past 100 hrs:   % Diet Eaten   05/29/19 1351 100 %   05/29/19 0927 100 %   05/28/19 1837 100 %     Medications: [x]  Reviewed     Most Recent POC Glucose:   Recent Labs     05/29/19  0346 05/28/19  0453 05/27/19  1917   * 313* 228*      Labs:   Lab Results   Component Value Date/Time    Hemoglobin A1c 8.2 (H) 05/28/2019 04:53 AM     Lab Results   Component Value Date/Time    Sodium 136 05/29/2019 03:46 AM    Potassium 3.4 (L) 05/29/2019 03:46 AM    Chloride 102 05/29/2019 03:46 AM    CO2 27 05/29/2019 03:46 AM Anion gap 7 05/29/2019 03:46 AM    Glucose 249 (H) 05/29/2019 03:46 AM    BUN 22 (H) 05/29/2019 03:46 AM    Creatinine 1.19 05/29/2019 03:46 AM    Calcium 9.2 05/29/2019 03:46 AM    Magnesium 2.8 (H) 04/05/2019 07:12 PM    Phosphorus 3.1 01/19/2019 06:51 AM    Albumin 3.9 05/27/2019 07:17 PM     Anthropometrics: BMI (calculated): 44.3  Wt Readings from Last 1 Encounters:   05/29/19 113.5 kg (250 lb 3.2 oz)      Ht Readings from Last 1 Encounters:   05/27/19 5' 3\" (1.6 m)     Estimated Nutrition Needs:  0855-7593 Kcal/day, 54-68 grams protein/day   Based on:   [] Actual BW    [] IBW   [x]  Adjusted BW  (68 kg)    Nutrition Diagnoses:    Altered nutrition related lab value related to diabetes as evidenced by Hemoglobin A1c of 8.2%  Nutrition Interventions: assessment of home management, diabetic diet  Goal: Blood glucose will be within target range of  mg/dL by 6/01/19     Nutrition Monitoring and Evaluation    []     Monitor po intake on meal rounds  [x]     Continue inpatient monitoring and intervention  []     Other:    Martine Llamas RD. CDE  pgr [phone]

## 2019-05-29 NOTE — DIABETES MGMT
Diabetes Patient/Family Education Record  Factors That  May Influence Patients Ability  to Learn or  Comply with Recommendations   []   Language barrier    []   Cultural needs   []   Motivation    []   Cognitive limitation    []   Physical   []   Education    []   Physiological factors   []   Hearing/vision/speaking impairment   []   Pentecostalism beliefs    []   Financial factors   []  Other:   [x]  No factors identified at this time. Person Instructed:   [x]   Patient   []   Family   []  Other     Preference for Learning:   [x]   Verbal   [x]   Written   []  Demonstration     Level of Comprehension & Competence:    [x]  Good                                      [] Fair                                     []  Poor                             []  Needs Reinforcement   [x]  Teachback completed    Education Component:   [x]  Medication management, including how to administer insulin (if appropriate) and potential medication interactions pt taking Lantus insulin 30 units every bedtime and Novolog corrective insulin with meals. []  Nutritional management -obtain usual meal pattern   []  Exercise   [x]  Signs, symptoms, and treatment of hyperglycemia and hypoglycemia   [x] Prevention, recognition and treatment of hyperglycemia and hypoglycemia   [x]  Importance of blood glucose monitoring and how to obtain a blood glucose meter has a glucometer and supplies at home. States she checks her blood glucose 4 times daily. []  Instruction on use of the blood glucose meter   [x]  Discuss the importance of HbA1C monitoring 8.2% equivalent to an average BG of 189 mg/dl over the past 2-3 months.    [x]  Sick day guidelines   [x]  Proper use and disposal of lancets, needles, syringes or insulin pens (if appropriate)   [x]  Potential long-term complications (retinopathy, kidney disease, neuropathy, foot care)   [x] Information about whom to contact in case of emergency or for more information    [x] Goal:  Patient/family will demonstrate understanding of Diabetes Self Management Skills by: (date) _______  Plan for post-discharge education or self-management support:    [x] Outpatient class schedule provided            [] Patient Declined    [] Scheduled for outpatient classes (date) _______  Verify:  Does patient understand how diabetes medications work? yes______________________  Does patient know what their most recent A1c is? yes______________________________  Does patient monitor glucose at home? Yes_____________________________________  Does patient have difficulty obtaining diabetes medications or testing supplies?  No issues voiced

## 2019-05-29 NOTE — PROGRESS NOTES
Problem: Self Care Deficits Care Plan (Adult) Goal: *Acute Goals and Plan of Care (Insert Text) Outcome: Resolved/Met

## 2019-05-29 NOTE — PROGRESS NOTES
Reason for Admission:   COPD (chronic obstructive pulmonary disease) ([facility] [J44.9]  COPD exacerbation ([facility] [J44.1]  [facility] [J44.1]               RRAT Score:     35             Resources/supports as identified by patient/family:       Top Challenges facing patient (as identified by patient/family and CM):     none    Finances/Medication cost?     Insurance  Transportation      daughtr Kylee Lomas  Support system or lack thereof? Daughter Kylee Lomas and personal aide everyday for 6 hours  Living arrangements? Live with daughter   Self-care/ADLs/Cognition? Does most         Current Advanced Directive/Advance Care Plan:   no                          Plan for utilizing home health:    no pt does not want HH                      Likelihood of readmission:   HIGH    Transition of Care Plan:                    Initial assessment completed with patient. Cognitive status of patient: oriented to time, place, person and situation. Face sheet information confirmed:  yes. The patient designates d to participate in her discharge plan and to receive any needed information. This patient lives in a single family home with patient and daughter. Patient is able to navigate steps as needed. Prior to hospitalization, patient was considered to be independent with ADLs/IADLS : yes with assist . If not independent,  patient needs assist with : dressing, bathing and food preparation    Patient has a current ACP document on file: yes  The patient and daughter will be available to transport patient home upon discharge. The patient already has Walker [employer] O2, and cpap medical equipment available in the home. Patient is not currently active with home health. This patient is on dialysis :no    . Freedom of choice signed: no, pt refuses home health. Currently, the discharge plan is Home. The patient states that she can obtain her medications from the pharmacy, and take her medications as directed.     Patient's current insurance is Medicare and Medicaid. Care Management Interventions  PCP Verified by CM: Yes  Mode of Transport at Discharge:  Other (see comment)(jonathan daughter)  Transition of Care Consult (CM Consult): Discharge Planning  Current Support Network: Relative's Home  Confirm Follow Up Transport: Family  Plan discussed with Pt/Family/Caregiver: Yes  Discharge Location  Discharge Placement: [address] Sylvia Lazcano RN BSN  Case Management  [phone]

## 2019-05-29 NOTE — PROGRESS NOTES
Problem: Mobility Impaired (Adult and Pediatric)  Goal: *Acute Goals and Plan of Care (Insert Text)  Description  Physical Therapy Goals  Initiated 5/29/2019 and to be accomplished within 7 day(s)  1. Patient will ambulate with modified independence for 150 feet with the least restrictive device. 2.  Patient will ascend/descend 12 stairs with bilateral handrail(s) with supervision/set-up. To prepare pt for home mobility. PLOF: Pt lives with daughter in a 2 story home with 2 steps to enter. Pt ambulated in the home with no AD and with RW independently. Outcome: Progressing Towards Goal  PHYSICAL THERAPY EVALUATION    Patient: Deuce Murguia (65 y.o. female)  Date: 5/29/2019  Primary Diagnosis: COPD (chronic obstructive pulmonary disease) (HCC) [J44.9]  COPD exacerbation (HCC) [J44.1]  COPD exacerbation (HCC) [J44.1]        Precautions:        PLOF: See goals section above    ASSESSMENT :  Based on the objective data described below, the patient presents with decreased functional activity tolerance following admission for COPD. Pt was cleared by nursing to work with therapy. Pt was received semi-reclined in bed, agreeable to participate in PT . Pt performed supine-sit independently and sit-stand with supervision. Pt displayed good sitting balance and good standing balance. Pt ambulated 80 feet with RW , supervision on 3 L/min. Pt  returned to sitting edge of bed with supervision. At conclusion of session, pt was left resting comfortably in chair, needs met, call bell in reach, nurse notified. Patient will benefit from skilled intervention to address the above impairments. Patient's rehabilitation potential is considered to be Good  Factors which may influence rehabilitation potential include:   ? None noted  ? Mental ability/status  ? Medical condition  ? Home/family situation and support systems  ? Safety awareness  ?          Pain tolerance/management  ? Other:      PLAN :  Recommendations and Planned Interventions:   ?           Bed Mobility Training             ? Neuromuscular Re-Education  ? Transfer Training                   ? Orthotic/Prosthetic Training  ? Gait Training                          ? Modalities  ? Therapeutic Exercises           ? Edema Management/Control  ? Therapeutic Activities            ? Family Training/Education  ? Patient Education  ? Other (comment):    Frequency/Duration: Patient will be followed by physical therapy 1-2 times per day to address goals. Discharge Recommendations: None  Further Equipment Recommendations for Discharge: N/A     SUBJECTIVE:   Patient stated ? I know my limits. ?    OBJECTIVE DATA SUMMARY:     Past Medical History:   Diagnosis Date    Asthma     Chronic lung disease     COPD     Cystocele, midline     Diabetes mellitus (HCC)     GERD (gastroesophageal reflux disease)     Hidradenitis suppurativa     Hyperlipidemia     Hypertension     SHERRIE on CPAP     CPAP    Stress incontinence      Past Surgical History:   Procedure Laterality Date    BREAST SURGERY PROCEDURE UNLISTED      Right breast benign tumor removal    HX APPENDECTOMY      HX CHOLECYSTECTOMY      HX DILATION AND CURETTAGE      Dysfunctional uterine bleeding, thought 2/2 fibroids    HX TUBAL LIGATION       Barriers to Learning/Limitations: None  Compensate with: N/A  Home Situation:  Home Situation  Home Environment: Private residence  # Steps to Enter: 2  One/Two Story Residence: Two story  Interior Rails: Both  Living Alone: No  Support Systems: Family member(s)  Patient Expects to be Discharged to[de-identified] Private residence  Current DME Used/Available at Home: Walker, rolling  Critical Behavior:  Neurologic State: Alert  Orientation Level: Appropriate for age  Cognition: Follows commands  Safety/Judgement: Fall prevention; Awareness of environment  Psychosocial  Patient Behaviors: Calm; Cooperative  Purposeful Interaction: Yes  Pt Identified Daily Priority: Clinical issues (comment)  Caritas Process: Nurture loving kindness;Establish trust  Caring Interventions: Reassure  Reassure: Informing;Caring rounds    Strength:    Strength: Within functional limits    Tone & Sensation:   Tone: Normal    Sensation: Intact    Range Of Motion:  AROM: Within functional limits    Functional Mobility:  Bed Mobility:     Supine to Sit: Modified independent  Sit to Supine: Modified independent     Transfers:  Sit to Stand: Modified independent  Stand to Sit: Modified independent    Balance:   Sitting: Intact  Standing: Intact  Standing - Static: Good  Standing - Dynamic : Fair  Ambulation/Gait Training:  Distance (ft): 80 Feet (ft)  Assistive Device: Walker, rolling  Ambulation - Level of Assistance: Supervision     Gait Description (WDL): Exceptions to WDL  Gait Abnormalities: Decreased step clearance      Pain:  Pain level pre-treatment: 0/10   Pain level post-treatment: 0/10   Pain Intervention(s) : Medication (see MAR); Rest, Ice, Repositioning  Response to intervention: Nurse notified, See doc flow    Activity Tolerance:   Fair  Please refer to the flowsheet for vital signs taken during this treatment. After treatment:   ?         Patient left in no apparent distress sitting up in chair  ? Patient left in no apparent distress in bed  ? Call bell left within reach  ? Nursing notified  ? Caregiver present  ? Bed alarm activated  ? SCDs applied    COMMUNICATION/EDUCATION:   ?         Role of Physical Therapy in the acute care setting. ?         Fall prevention education was provided and the patient/caregiver indicated understanding. ? Patient/family have participated as able in goal setting and plan of care. ?         Patient/family agree to work toward stated goals and plan of care.   ?         Patient understands intent and goals of therapy, but is neutral about his/her participation. ? Patient is unable to participate in goal setting/plan of care: ongoing with therapy staff. ?         Other:     Thank you for this referral.  Saqib Sosa, PT   Time Calculation: 23 mins      Eval Complexity: History: MEDIUM  Complexity : 1-2 comorbidities / personal factors will impact the outcome/ POC Exam:MEDIUM Complexity : 3 Standardized tests and measures addressing body structure, function, activity limitation and / or participation in recreation  Presentation: MEDIUM Complexity : Evolving with changing characteristics  Clinical Decision Making:Medium Complexity    Overall Complexity:MEDIUM

## 2019-05-30 LAB
ANION GAP SERPL CALC-SCNC: 6 MMOL/L (ref 3–18)
BASOPHILS # BLD: 0 K/UL (ref 0–0.1)
BASOPHILS NFR BLD: 0 % (ref 0–2)
BUN SERPL-MCNC: 17 MG/DL (ref 7–18)
BUN/CREAT SERPL: 19 (ref 12–20)
CALCIUM SERPL-MCNC: 9 MG/DL (ref 8.5–10.1)
CHLORIDE SERPL-SCNC: 103 MMOL/L (ref 100–108)
CO2 SERPL-SCNC: 27 MMOL/L (ref 21–32)
CREAT SERPL-MCNC: 0.91 MG/DL (ref 0.6–1.3)
DIFFERENTIAL METHOD BLD: ABNORMAL
EOSINOPHIL # BLD: 0 K/UL (ref 0–0.4)
EOSINOPHIL NFR BLD: 0 % (ref 0–5)
ERYTHROCYTE [DISTWIDTH] IN BLOOD BY AUTOMATED COUNT: 13.8 % (ref 11.6–14.5)
GLUCOSE BLD STRIP.AUTO-MCNC: 155 MG/DL (ref 70–110)
GLUCOSE BLD STRIP.AUTO-MCNC: 232 MG/DL (ref 70–110)
GLUCOSE BLD STRIP.AUTO-MCNC: 303 MG/DL (ref 70–110)
GLUCOSE BLD STRIP.AUTO-MCNC: 307 MG/DL (ref 70–110)
GLUCOSE SERPL-MCNC: 210 MG/DL (ref 74–99)
HCT VFR BLD AUTO: 36 % (ref 35–45)
HGB BLD-MCNC: 11.8 G/DL (ref 12–16)
LYMPHOCYTES # BLD: 2.3 K/UL (ref 0.9–3.6)
LYMPHOCYTES NFR BLD: 32 % (ref 21–52)
MCH RBC QN AUTO: 29.4 PG (ref 24–34)
MCHC RBC AUTO-ENTMCNC: 32.8 G/DL (ref 31–37)
MCV RBC AUTO: 89.8 FL (ref 74–97)
MONOCYTES # BLD: 0.5 K/UL (ref 0.05–1.2)
MONOCYTES NFR BLD: 7 % (ref 3–10)
NEUTS SEG # BLD: 4.3 K/UL (ref 1.8–8)
NEUTS SEG NFR BLD: 61 % (ref 40–73)
PLATELET # BLD AUTO: 259 K/UL (ref 135–420)
PMV BLD AUTO: 9.3 FL (ref 9.2–11.8)
POTASSIUM SERPL-SCNC: 4.3 MMOL/L (ref 3.5–5.5)
RBC # BLD AUTO: 4.01 M/UL (ref 4.2–5.3)
SODIUM SERPL-SCNC: 136 MMOL/L (ref 136–145)
WBC # BLD AUTO: 7.1 K/UL (ref 4.6–13.2)

## 2019-05-30 PROCEDURE — 82962 GLUCOSE BLOOD TEST: CPT

## 2019-05-30 PROCEDURE — 94640 AIRWAY INHALATION TREATMENT: CPT

## 2019-05-30 PROCEDURE — 80048 BASIC METABOLIC PNL TOTAL CA: CPT

## 2019-05-30 PROCEDURE — 74011000250 HC RX REV CODE- 250: Performed by: STUDENT IN AN ORGANIZED HEALTH CARE EDUCATION/TRAINING PROGRAM

## 2019-05-30 PROCEDURE — 74011636637 HC RX REV CODE- 636/637: Performed by: FAMILY MEDICINE

## 2019-05-30 PROCEDURE — 74011636637 HC RX REV CODE- 636/637: Performed by: STUDENT IN AN ORGANIZED HEALTH CARE EDUCATION/TRAINING PROGRAM

## 2019-05-30 PROCEDURE — 74011250637 HC RX REV CODE- 250/637: Performed by: STUDENT IN AN ORGANIZED HEALTH CARE EDUCATION/TRAINING PROGRAM

## 2019-05-30 PROCEDURE — 65660000000 HC RM CCU STEPDOWN

## 2019-05-30 PROCEDURE — 36415 COLL VENOUS BLD VENIPUNCTURE: CPT

## 2019-05-30 PROCEDURE — 85025 COMPLETE CBC W/AUTO DIFF WBC: CPT

## 2019-05-30 PROCEDURE — 97116 GAIT TRAINING THERAPY: CPT

## 2019-05-30 RX ADMIN — INSULIN GLARGINE 30 UNITS: 100 INJECTION, SOLUTION SUBCUTANEOUS at 21:15

## 2019-05-30 RX ADMIN — INSULIN LISPRO 6 UNITS: 100 INJECTION, SOLUTION INTRAVENOUS; SUBCUTANEOUS at 21:15

## 2019-05-30 RX ADMIN — IPRATROPIUM BROMIDE AND ALBUTEROL SULFATE 3 ML: .5; 3 SOLUTION RESPIRATORY (INHALATION) at 13:06

## 2019-05-30 RX ADMIN — LISINOPRIL 20 MG: 20 TABLET ORAL at 17:10

## 2019-05-30 RX ADMIN — BUDESONIDE AND FORMOTEROL FUMARATE DIHYDRATE 2 PUFF: 160; 4.5 AEROSOL RESPIRATORY (INHALATION) at 19:59

## 2019-05-30 RX ADMIN — LISINOPRIL 20 MG: 20 TABLET ORAL at 08:33

## 2019-05-30 RX ADMIN — INSULIN LISPRO 12 UNITS: 100 INJECTION, SOLUTION INTRAVENOUS; SUBCUTANEOUS at 12:36

## 2019-05-30 RX ADMIN — MONTELUKAST 10 MG: 10 TABLET, FILM COATED ORAL at 21:15

## 2019-05-30 RX ADMIN — IPRATROPIUM BROMIDE AND ALBUTEROL SULFATE 3 ML: .5; 3 SOLUTION RESPIRATORY (INHALATION) at 07:59

## 2019-05-30 RX ADMIN — PANTOPRAZOLE SODIUM 40 MG: 40 TABLET, DELAYED RELEASE ORAL at 08:30

## 2019-05-30 RX ADMIN — IPRATROPIUM BROMIDE AND ALBUTEROL SULFATE 3 ML: .5; 3 SOLUTION RESPIRATORY (INHALATION) at 19:59

## 2019-05-30 RX ADMIN — INSULIN LISPRO 3 UNITS: 100 INJECTION, SOLUTION INTRAVENOUS; SUBCUTANEOUS at 08:30

## 2019-05-30 RX ADMIN — BUDESONIDE AND FORMOTEROL FUMARATE DIHYDRATE 2 PUFF: 160; 4.5 AEROSOL RESPIRATORY (INHALATION) at 08:00

## 2019-05-30 RX ADMIN — INSULIN LISPRO 12 UNITS: 100 INJECTION, SOLUTION INTRAVENOUS; SUBCUTANEOUS at 17:17

## 2019-05-30 RX ADMIN — FLUTICASONE PROPIONATE 2 SPRAY: 50 SPRAY, METERED NASAL at 08:34

## 2019-05-30 RX ADMIN — LEVOFLOXACIN 750 MG: 750 TABLET, FILM COATED ORAL at 21:15

## 2019-05-30 RX ADMIN — HYDROCHLOROTHIAZIDE 12.5 MG: 25 TABLET ORAL at 08:33

## 2019-05-30 RX ADMIN — PREDNISONE 40 MG: 20 TABLET ORAL at 08:35

## 2019-05-30 RX ADMIN — ASPIRIN 81 MG: 81 TABLET, COATED ORAL at 08:33

## 2019-05-30 RX ADMIN — IPRATROPIUM BROMIDE AND ALBUTEROL SULFATE 3 ML: .5; 3 SOLUTION RESPIRATORY (INHALATION) at 16:41

## 2019-05-30 NOTE — ROUTINE PROCESS
Bedside shift change report given to Marcella Corbin RN (oncoming nurse) by Candido Lennox, RN (offgoing nurse). Report included the following information SBAR, Kardex and MAR.

## 2019-05-30 NOTE — PROGRESS NOTES
Bedside shift change report given to [address] [place] [address] (oncoming nurse) by Delta Castillo RN (offgoing nurse). Report included the following information SBAR, Kardex and MAR.

## 2019-05-30 NOTE — PROGRESS NOTES
Intern Progress Note  [facility]       Patient: Tayo Queen MRN: [id number]  CSN: [id number]    YOB: 1960  Age: 60 y.o. Sex: female    DOA: 5/27/2019 LOS:  LOS: 3 days                    Subjective:     Acute events: No acute events  overnight. She was on BiPAP overnight. She is no longer short of breath at rest. Still having SOB when walking to the bathroom, but less so than yesterday. ROS: no fevers or chills, +cough w/ SOB and some wheezing, no chest pain, no abdominal pain/nausea/vomiting/diarrhea    Objective:      Patient Vitals for the past 24 hrs:   Temp Pulse Resp BP SpO2   05/30/19 0955 97.2 °F (36.2 °C) 77 18 112/64 94 %   05/30/19 0838 -- 73 -- 116/65 --   05/30/19 0600 98.9 °F (37.2 °C) 65 18 99/59 100 %   05/30/19 0247 98.5 °F (36.9 °C) 64 18 102/64 100 %   05/29/19 2309 -- -- -- -- 98 %   05/29/19 2200 97.9 °F (36.6 °C) 80 20 102/52 98 %   05/29/19 1951 -- -- -- -- 98 %   05/29/19 1949 -- -- -- -- 98 %   05/29/19 1815 98 °F (36.7 °C) 82 20 93/60 95 %   05/29/19 1438 98 °F (36.7 °C) 85 18 107/57 95 %         Intake/Output Summary (Last 24 hours) at 5/30/2019 1218  Last data filed at 5/30/2019 0339  Gross per 24 hour   Intake 960 ml   Output --   Net 960 ml       Physical Exam:  General:  Alert and Responsive  HEENT: Conjunctiva pink, sclera anicteric. EOMI. No cervical, supraclavicular, occipital or submandibular lymphadenopathy. No other gross abnormalities present. CV:  RRR, no murmurs. No visible pulsations or thrills. RESP:  Expiratory wheezing present, no labored breathing. Equal expansion bilaterally. ABD:  Soft, nontender, nondistended. No hepatosplenomegaly. No suprapubic tenderness. MS:  No joint deformity or instability. No atrophy. Neuro:  moving upper extremities and lower extremities. Answers questions appropriately. Ext:  No edema. 2+ radial and dp pulses bilaterally. Skin:  No rashes, lesions, or ulcers.   Good paul. Lab/Data Reviewed: All lab results for the last 24 hours reviewed. Scheduled Medications Reviewed:  Current Facility-Administered Medications   Medication Dose Route Frequency    insulin glargine (LANTUS) injection 30 Units  30 Units SubCUTAneous QHS    enoxaparin (LOVENOX) injection 40 mg  40 mg SubCUTAneous Q24H    insulin lispro (HUMALOG) injection   SubCUTAneous AC&HS    albuterol-ipratropium (DUO-NEB) 2.5 MG-0.5 MG/3 ML  3 mL Nebulization Q4H RT    aspirin delayed-release tablet 81 mg  81 mg Oral DAILY    fluticasone propionate (FLONASE) 50 mcg/actuation nasal spray 2 Spray  2 Spray Both Nostrils DAILY    hydroCHLOROthiazide (HYDRODIURIL) tablet 12.5 mg  12.5 mg Oral DAILY    lisinopril (PRINIVIL, ZESTRIL) tablet 20 mg  20 mg Oral BID    montelukast (SINGULAIR) tablet 10 mg  10 mg Oral QHS    pantoprazole (PROTONIX) tablet 40 mg  40 mg Oral ACB    budesonide-formoterol (SYMBICORT) 160-4.5 mcg/actuation HFA inhaler 2 Puff  2 Puff Inhalation BID RT    levoFLOXacin (LEVAQUIN) tablet 750 mg  750 mg Oral Q24H    predniSONE (DELTASONE) tablet 40 mg  40 mg Oral DAILY WITH BREAKFAST         Imaging, microbiology, and EKG/Telemetry:  Xr Chest Port    Result Date: 5/27/2019  No acute finding. Assessment/Plan   60 y.o. female with PMH  COPD (on 2L O2 NC with activity), diastolic CHF, DMT2, HTN, GERD, allergic rhinitis and SHERRIE on CPAP, now admitted with COPD exacerbation.     Acute on Chronic COPD exacerbation:  No history of asthma, it would be very unusual for this to be diagnosed as an adult after years of tobacco use. Improving now, on 2L of O2 on nasal cannula. On home 2L oxygen for activity.  She is using the BiPAP qhs.   - scheduled duonebs q4h with RT  - levofloxacin 750 mg daily for five days  - Goal SpO2 88-92%  - prednisone 40 mg for five days  - continue singular 10 mg daily  - PT/OT/CM     Diastolic CHF with preserved EF, HTN  Stable on exam. ECHO 7/2018 EF 66-70% with no wma.  - Continue aspirin 81 mg po daily  - Continue lisinopril 40 mg po daily  - Continue HCTZ 12.5 mg po daily     DMT2   BG on admission 228. Last HgbA1c 8 from 4/5/2019, was 8.2 on admission.  Home Lantus dose is 30U qhs.  - SSI  -  On home Lantus 30U qhs  - Consistent carbohydrate diet      H/O SHERRIE  - hold CPAP qhs  - BIPAP     GERD, Allergic rhinitis  - Hold home omeprazole 40 mg po daily   - Continue protonix 40 mg po daily  - Continue home famotidine 20 mg po bid prn   - Continue home simethicone prn   - Continue flonase     Diet: Diabetic  DVT Prophylaxis: Lovenox  Code Status: Full  Point of Contact: Yee Stanford ([phone]     Disposition and anticipated LOS: [address], [address] PGY-1  05/30/19 10:15 AM

## 2019-05-30 NOTE — PROGRESS NOTES
Problem: Mobility Impaired (Adult and Pediatric)  Goal: *Acute Goals and Plan of Care (Insert Text)  Description  Physical Therapy Goals  Initiated 5/29/2019 and to be accomplished within 7 day(s)  1. Patient will ambulate with modified independence for 150 feet with the least restrictive device. 2.  Patient will ascend/descend 12 stairs with bilateral handrail(s) with supervision/set-up. To prepare pt for home mobility. PLOF: Pt lives with daughter in a 2 story home with 2 steps to enter. Pt ambulated in the home with no AD and with RW independently. Outcome: Progressing Towards Goal   PHYSICAL THERAPY TREATMENT    Patient: Noa Choudhary (54 y.o. female)  Date: 5/30/2019  Diagnosis: COPD (chronic obstructive pulmonary disease) ([facility] [address] [J44.9]  COPD exacerbation ([facility]) [J44.1]  COPD exacerbation ([facility] [address] [J44.1]   Precautions: Fall    ASSESSMENT:  Patient presents today alert and agreeable to therapy and was on 2L NC O2 during mobility with Spo2 96% and required 3 standing rest breaks during 110ft ambulation with RW. Patient demos slow pace with no LoB and tolerated well. Patient then negotiated 3 steps using 6inch box step and declined ability to perform further. Patient confirms she has BSC at home is she is unable to negotiate steps to 2nd level and reports that her bedroom is on first level. Patient left resting with call bell by her side and handoff performed to nursing. Progression toward goals:   ?      Improving appropriately and progressing toward goals  ? Improving slowly and progressing toward goals  ? Not making progress toward goals and plan of care will be adjusted     PLAN:  Patient continues to benefit from skilled intervention to address the above impairments. Continue treatment per established plan of care. Discharge Recommendations:  Home Health  Further Equipment Recommendations for Discharge:  rolling walker     SUBJECTIVE:   Patient stated ? I feel a little better. ?    OBJECTIVE DATA SUMMARY:   Critical Behavior:  Neurologic State: Alert, Eyes open spontaneously  Orientation Level: Oriented X4  Cognition: Appropriate decision making  Safety/Judgement: Fall prevention, Awareness of environment  Functional Mobility Training:  Bed Mobility:   Scooting: Modified independent   Transfers:  Sit to Stand: Modified independent  Stand to Sit: Modified independent   Balance:  Sitting: Intact  Standing: Intact; With support   Ambulation/Gait Training:  Distance (ft): 110 Feet (ft)(3 standing rest breaks)  Assistive Device: Walker, rolling  Ambulation - Level of Assistance: Supervision   Gait Abnormalities: Decreased step clearance  Stairs:  Number of Stairs Trained: 3(6inch box step)  Stairs - Level of Assistance: Supervision  Rail Use: None  Education:  ?         Bed mobility  ? Transfers  ? Ambulation  ? Assistive device management  ? Stairs  ? Body mechanics  ? Position change  ? Activity pacing/energy conservation  ? Other:   Pain:  Pain level pre-treatment: 0/10  Pain level post-treatment: 0/10     Activity Tolerance:   Patient tolerated activity well and was left resting with call bell by her side. Please refer to the flowsheet for vital signs taken during this treatment. After treatment:   ? Patient left in no apparent distress sitting up in chair  ? Patient left in no apparent distress in bed  ? Call bell left within reach  ? Nursing notified Rosemarie Lvoett)  ? Caregiver present  ? Bed alarm activated  ? SCDs applied      COMMUNICATION/EDUCATION:   ?         Role of Physical Therapy in the acute care setting. ?         Fall prevention education was provided and the patient/caregiver indicated understanding. ? Patient/family have participated as able in working toward goals and plan of care. ?         Patient/family agree to work toward stated goals and plan of care.   ?         Patient understands intent and goals of therapy, but is neutral about his/her participation. ? Patient is unable to participate in stated goals/plan of care: ongoing with therapy staff.   ?         Other:        Anahy Stark, PT   Time Calculation: 23 mins

## 2019-05-30 NOTE — PROGRESS NOTES
Out of bed working with physical therapy. A/OX4. Denies pain. 02 2 liters via nasal cannula. Lung sound diminished bilaterally, expiratory wheezing noted Nonproductive cough. Short of brath with activity. Call bell phone within reach.

## 2019-05-30 NOTE — PROGRESS NOTES
Medical Student Progress Note  [facility]       Patient: Marquis Santiago MRN: [id number]  CSN: [id number]    YOB: 1960  Age: 60 y.o. Sex: female    DOA: 5/27/2019 LOS:  LOS: 3 days                    Subjective:     Acute events: No acute events overnight. VSS. Patient notes continued improvement overall. Although she claims to be coughing more often and still cannot bring anything up, her SOB and wheezing have improved. She is able to ambulate more without dyspnea. ROS + for cough, wheeze, SOB. Negative for CP, N, V, abdominal pain, fevers, chills. Objective:      Patient Vitals for the past 24 hrs:   Temp Pulse Resp BP SpO2   05/30/19 0600 98.9 °F (37.2 °C) 65 18 99/59 100 %   05/30/19 0247 98.5 °F (36.9 °C) 64 18 102/64 100 %   05/29/19 2309 -- -- -- -- 98 %   05/29/19 2200 97.9 °F (36.6 °C) 80 20 102/52 98 %   05/29/19 1951 -- -- -- -- 98 %   05/29/19 1949 -- -- -- -- 98 %   05/29/19 1815 98 °F (36.7 °C) 82 20 93/60 95 %   05/29/19 1438 98 °F (36.7 °C) 85 18 107/57 95 %   05/29/19 1039 97.7 °F (36.5 °C) 86 20 104/67 95 %         Intake/Output Summary (Last 24 hours) at 5/30/2019 0710  Last data filed at 5/29/2019 2204  Gross per 24 hour   Intake 960 ml   Output --   Net 960 ml       Physical Exam:   General:  Alert and Responsive and in No acute distress. HEENT: Conjunctiva pink, sclera anicteric. EOMI. Pharynx moist, nonerythematous. Moist mucous membranes. Thyroid not enlarged, no nodules. No cervical, supraclavicular, occipital or submandibular lymphadenopathy. No other gross abnormalities present. CV:  RRR, no murmurs. No visible pulsations or thrills. RESP:  Unlabored breathing on 2L NC. Moderate inspiratory wheezes throughout, improved from yesterday. No rales, or rhonchi. Equal expansion bilaterally. ABD:  Soft, nontender, nondistended. No hepatosplenomegaly. No suprapubic tenderness. No CVA tenderness.  Freely mobile umbilical hernia without erythema, warmth, or tenderness. RECTAL:  Deferred. MS:  No joint deformity or instability. No atrophy. Neuro:  5/5 strength bilateral upper extremities and lower extremities. A+Ox3. Ext:  No edema. 2+ radial and dp pulses bilaterally. Skin:  No rashes, lesions, or ulcers. Good turgor. Lab/Data Reviewed:  Recent Results (from the past 24 hour(s))   GLUCOSE, POC    Collection Time: 05/29/19  7:53 AM   Result Value Ref Range    Glucose (POC) 224 (H) 70 - 110 mg/dL   GLUCOSE, POC    Collection Time: 05/29/19 11:26 AM   Result Value Ref Range    Glucose (POC) 336 (H) 70 - 110 mg/dL   GLUCOSE, POC    Collection Time: 05/29/19  3:23 PM   Result Value Ref Range    Glucose (POC) 439 (HH) 70 - 110 mg/dL   POTASSIUM    Collection Time: 05/29/19  3:40 PM   Result Value Ref Range    Potassium 5.0 3.5 - 5.5 mmol/L   GLUCOSE, POC    Collection Time: 05/29/19  9:42 PM   Result Value Ref Range    Glucose (POC) 277 (H) 70 - 787 mg/dL   METABOLIC PANEL, BASIC    Collection Time: 05/30/19  4:05 AM   Result Value Ref Range    Sodium 136 136 - 145 mmol/L    Potassium 4.3 3.5 - 5.5 mmol/L    Chloride 103 100 - 108 mmol/L    CO2 27 21 - 32 mmol/L    Anion gap 6 3.0 - 18 mmol/L    Glucose 210 (H) 74 - 99 mg/dL    BUN 17 7.0 - 18 MG/DL    Creatinine 0.91 0.6 - 1.3 MG/DL    BUN/Creatinine ratio 19 12 - 20      GFR est AA >60 >60 ml/min/1.73m2    GFR est non-AA >60 >60 ml/min/1.73m2    Calcium 9.0 8.5 - 10.1 MG/DL   CBC WITH AUTOMATED DIFF    Collection Time: 05/30/19  4:05 AM   Result Value Ref Range    WBC 7.1 4.6 - 13.2 K/uL    RBC 4.01 (L) 4.20 - 5.30 M/uL    HGB 11.8 (L) 12.0 - 16.0 g/dL    HCT 36.0 35.0 - 45.0 %    MCV 89.8 74.0 - 97.0 FL    MCH 29.4 24.0 - 34.0 PG    MCHC 32.8 31.0 - 37.0 g/dL    RDW 13.8 11.6 - 14.5 %    PLATELET 483 618 - 756 K/uL    MPV 9.3 9.2 - 11.8 FL    NEUTROPHILS 61 40 - 73 %    LYMPHOCYTES 32 21 - 52 %    MONOCYTES 7 3 - 10 %    EOSINOPHILS 0 0 - 5 %    BASOPHILS 0 0 - 2 %    ABS. NEUTROPHILS 4.3 1.8 - 8.0 K/UL    ABS. LYMPHOCYTES 2.3 0.9 - 3.6 K/UL    ABS. MONOCYTES 0.5 0.05 - 1.2 K/UL    ABS. EOSINOPHILS 0.0 0.0 - 0.4 K/UL    ABS. BASOPHILS 0.0 0.0 - 0.1 K/UL    DF AUTOMATED           Scheduled Medications Reviewed:  Current Facility-Administered Medications   Medication Dose Route Frequency    insulin glargine (LANTUS) injection 30 Units  30 Units SubCUTAneous QHS    enoxaparin (LOVENOX) injection 40 mg  40 mg SubCUTAneous Q24H    insulin lispro (HUMALOG) injection   SubCUTAneous AC&HS    albuterol-ipratropium (DUO-NEB) 2.5 MG-0.5 MG/3 ML  3 mL Nebulization Q4H RT    aspirin delayed-release tablet 81 mg  81 mg Oral DAILY    fluticasone propionate (FLONASE) 50 mcg/actuation nasal spray 2 Spray  2 Spray Both Nostrils DAILY    hydroCHLOROthiazide (HYDRODIURIL) tablet 12.5 mg  12.5 mg Oral DAILY    lisinopril (PRINIVIL, ZESTRIL) tablet 20 mg  20 mg Oral BID    montelukast (SINGULAIR) tablet 10 mg  10 mg Oral QHS    pantoprazole (PROTONIX) tablet 40 mg  40 mg Oral ACB    budesonide-formoterol (SYMBICORT) 160-4.5 mcg/actuation HFA inhaler 2 Puff  2 Puff Inhalation BID RT    levoFLOXacin (LEVAQUIN) tablet 750 mg  750 mg Oral Q24H    predniSONE (DELTASONE) tablet 40 mg  40 mg Oral DAILY WITH BREAKFAST         Imaging, microbiology, and EKG/Telemetry:  None    Assessment/Plan     Patient is a 59 yo female with PMH of COPD requiring 2L oxygen NC with activity, diastolic CHF, DMT2, HTN, GERD, allergic rhinitis, and SHERRIE on CPAP, who presents for management of COPD exacerbation.     Acute on Chronic COPD exacerbation  COPD may have been triggered by change in season vs allergies. Troponin, BNP and CXR normal on admission. On home 2L NC oxygen for activity. Patient did not improve after receiving steroids, albuterol nebs and lasix in the ER. Repeat ABG showed improvement in respiratory acidosis to 7.404, with an sO2 of 96% on 21%FiO2. Clinically improving as well.   - BiPAP at night  -Continue 2L NC during the day  - duonebs q4h with RT  - levofloxacin 750 mg daily for five days (5/27-5/31)  - sputum cx and gram stain pending  - Goal SpO2 88-92%  - prednisone 40 mg for five days  - continue singular 10 mg daily  -continue Symbicort 160-4.5 mcg bid  - Hold advair 2 puffs BID  - Hold Spiriva 2 puffs daily  - PT/OT/CM     Diastolic CHF with preserved EF, HTN  Stable on exam. ECHO 7/2018 EF 66-70% with no wma. - Continue aspirin 81 mg po daily  - Vital signs per unit routine  - Continue lisinopril 40 mg po daily  - Continue HCTZ 12.5 mg po daily     DMT2   BG on admission 228 > 327 > 249>>> 439>277>210 . Last HgbA1c 8 from 4/5/2019, was 8.2 on admission.   - Accuchecks achs  - SSI  - Lantus increased to 30 units (home dose)  - Consistent carbohydrate diet      H/O SHERRIE  -hold CPAP qhs  -BiPAP     GERD, Allergic rhinitis  - Hold home omeprazole 40 mg po daily   - Continue protonix 40 mg po daily  - Continue home famotidine 20 mg po bid prn   - Continue home simethicone prn   - Continue flonase        Diet: Diabetic   DVT Prophylaxis: Lovenox  Code Status: Full  Point of Contact: Domenica Morrison (Relationship: Daughter), [phone]     Disposition: likely discharge today      [facility], Medical Student  05/30/19 7:10 AM    *ATTENTION:  This note has been created by a medical student for educational purposes only. Please do not refer to the content of this note for clinical decision-making, billing, or other purposes. Please see attending physicians note to obtain clinical information on this patient. *

## 2019-05-31 ENCOUNTER — HOME HEALTH ADMISSION (OUTPATIENT)
Dept: HOME HEALTH SERVICES | Facility: HOME HEALTH | Age: 59
End: 2019-05-31

## 2019-05-31 VITALS
BODY MASS INDEX: 43.96 KG/M2 | SYSTOLIC BLOOD PRESSURE: 150 MMHG | OXYGEN SATURATION: 93 % | TEMPERATURE: 97.8 F | HEART RATE: 75 BPM | HEIGHT: 63 IN | DIASTOLIC BLOOD PRESSURE: 81 MMHG | RESPIRATION RATE: 16 BRPM | WEIGHT: 248.1 LBS

## 2019-05-31 LAB
ANION GAP SERPL CALC-SCNC: 5 MMOL/L (ref 3–18)
BASOPHILS # BLD: 0 K/UL (ref 0–0.1)
BASOPHILS NFR BLD: 0 % (ref 0–2)
BUN SERPL-MCNC: 17 MG/DL (ref 7–18)
BUN/CREAT SERPL: 18 (ref 12–20)
CALCIUM SERPL-MCNC: 9 MG/DL (ref 8.5–10.1)
CHLORIDE SERPL-SCNC: 102 MMOL/L (ref 100–108)
CO2 SERPL-SCNC: 31 MMOL/L (ref 21–32)
CREAT SERPL-MCNC: 0.96 MG/DL (ref 0.6–1.3)
DIFFERENTIAL METHOD BLD: ABNORMAL
EOSINOPHIL # BLD: 0 K/UL (ref 0–0.4)
EOSINOPHIL NFR BLD: 0 % (ref 0–5)
ERYTHROCYTE [DISTWIDTH] IN BLOOD BY AUTOMATED COUNT: 13.7 % (ref 11.6–14.5)
GLUCOSE BLD STRIP.AUTO-MCNC: 128 MG/DL (ref 70–110)
GLUCOSE BLD STRIP.AUTO-MCNC: 238 MG/DL (ref 70–110)
GLUCOSE SERPL-MCNC: 237 MG/DL (ref 74–99)
HCT VFR BLD AUTO: 35.7 % (ref 35–45)
HGB BLD-MCNC: 11.7 G/DL (ref 12–16)
LYMPHOCYTES # BLD: 2.6 K/UL (ref 0.9–3.6)
LYMPHOCYTES NFR BLD: 34 % (ref 21–52)
MCH RBC QN AUTO: 29.2 PG (ref 24–34)
MCHC RBC AUTO-ENTMCNC: 32.8 G/DL (ref 31–37)
MCV RBC AUTO: 89 FL (ref 74–97)
MONOCYTES # BLD: 0.5 K/UL (ref 0.05–1.2)
MONOCYTES NFR BLD: 6 % (ref 3–10)
NEUTS SEG # BLD: 4.7 K/UL (ref 1.8–8)
NEUTS SEG NFR BLD: 60 % (ref 40–73)
PLATELET # BLD AUTO: 270 K/UL (ref 135–420)
PMV BLD AUTO: 9.1 FL (ref 9.2–11.8)
POTASSIUM SERPL-SCNC: 3.8 MMOL/L (ref 3.5–5.5)
RBC # BLD AUTO: 4.01 M/UL (ref 4.2–5.3)
SODIUM SERPL-SCNC: 138 MMOL/L (ref 136–145)
WBC # BLD AUTO: 7.8 K/UL (ref 4.6–13.2)

## 2019-05-31 PROCEDURE — 94660 CPAP INITIATION&MGMT: CPT

## 2019-05-31 PROCEDURE — 74011000250 HC RX REV CODE- 250: Performed by: STUDENT IN AN ORGANIZED HEALTH CARE EDUCATION/TRAINING PROGRAM

## 2019-05-31 PROCEDURE — 97116 GAIT TRAINING THERAPY: CPT

## 2019-05-31 PROCEDURE — 74011250637 HC RX REV CODE- 250/637: Performed by: STUDENT IN AN ORGANIZED HEALTH CARE EDUCATION/TRAINING PROGRAM

## 2019-05-31 PROCEDURE — 85025 COMPLETE CBC W/AUTO DIFF WBC: CPT

## 2019-05-31 PROCEDURE — 74011636637 HC RX REV CODE- 636/637: Performed by: STUDENT IN AN ORGANIZED HEALTH CARE EDUCATION/TRAINING PROGRAM

## 2019-05-31 PROCEDURE — 94640 AIRWAY INHALATION TREATMENT: CPT

## 2019-05-31 PROCEDURE — 74011250636 HC RX REV CODE- 250/636: Performed by: STUDENT IN AN ORGANIZED HEALTH CARE EDUCATION/TRAINING PROGRAM

## 2019-05-31 PROCEDURE — 36415 COLL VENOUS BLD VENIPUNCTURE: CPT

## 2019-05-31 PROCEDURE — 80048 BASIC METABOLIC PNL TOTAL CA: CPT

## 2019-05-31 PROCEDURE — 74011636637 HC RX REV CODE- 636/637: Performed by: FAMILY MEDICINE

## 2019-05-31 PROCEDURE — 82962 GLUCOSE BLOOD TEST: CPT

## 2019-05-31 RX ORDER — LEVOFLOXACIN 750 MG/1
750 TABLET ORAL EVERY 24 HOURS
Qty: 2 TAB | Refills: 0 | Status: SHIPPED | OUTPATIENT
Start: 2019-05-31 | End: 2019-06-02

## 2019-05-31 RX ORDER — PREDNISONE 10 MG/1
TABLET ORAL
Qty: 10 TAB | Refills: 0 | Status: ON HOLD | OUTPATIENT
Start: 2019-05-31 | End: 2019-06-29

## 2019-05-31 RX ORDER — PREDNISONE 10 MG/1
TABLET ORAL
Qty: 30 TAB | Refills: 1 | Status: ON HOLD | OUTPATIENT
Start: 2019-06-06 | End: 2019-07-02 | Stop reason: SDUPTHER

## 2019-05-31 RX ADMIN — PREDNISONE 40 MG: 20 TABLET ORAL at 09:55

## 2019-05-31 RX ADMIN — PANTOPRAZOLE SODIUM 40 MG: 40 TABLET, DELAYED RELEASE ORAL at 09:56

## 2019-05-31 RX ADMIN — HYDROCHLOROTHIAZIDE 12.5 MG: 25 TABLET ORAL at 09:55

## 2019-05-31 RX ADMIN — IPRATROPIUM BROMIDE AND ALBUTEROL SULFATE 3 ML: .5; 3 SOLUTION RESPIRATORY (INHALATION) at 08:02

## 2019-05-31 RX ADMIN — FLUTICASONE PROPIONATE 2 SPRAY: 50 SPRAY, METERED NASAL at 09:56

## 2019-05-31 RX ADMIN — LISINOPRIL 20 MG: 20 TABLET ORAL at 09:56

## 2019-05-31 RX ADMIN — ENOXAPARIN SODIUM 40 MG: 40 INJECTION SUBCUTANEOUS at 00:11

## 2019-05-31 RX ADMIN — INSULIN LISPRO 6 UNITS: 100 INJECTION, SOLUTION INTRAVENOUS; SUBCUTANEOUS at 12:02

## 2019-05-31 RX ADMIN — ASPIRIN 81 MG: 81 TABLET, COATED ORAL at 09:56

## 2019-05-31 RX ADMIN — BUDESONIDE AND FORMOTEROL FUMARATE DIHYDRATE 2 PUFF: 160; 4.5 AEROSOL RESPIRATORY (INHALATION) at 08:01

## 2019-05-31 NOTE — PROGRESS NOTES
Problem: Mobility Impaired (Adult and Pediatric)  Goal: *Acute Goals and Plan of Care (Insert Text)  Description  Physical Therapy Goals  Initiated 5/29/2019 and to be accomplished within 7 day(s)  1. Patient will ambulate with modified independence for 150 feet with the least restrictive device. 2.  Patient will ascend/descend 12 stairs with bilateral handrail(s) with supervision/set-up. To prepare pt for home mobility. PLOF: Pt lives with daughter in a 2 story home with 2 steps to enter. Pt ambulated in the home with no AD and with RW independently. Outcome: Progressing Towards Goal   PHYSICAL THERAPY TREATMENT    Patient: Chris Burroughs (26 y.o. female)  Date: 5/31/2019  Diagnosis: COPD (chronic obstructive pulmonary disease) (HCC) [J44.9]  COPD exacerbation (HCC) [J44.1]  COPD exacerbation (HCC) [J44.1] COPD exacerbation ([facility] [address]    Precautions: None    ASSESSMENT:  Patient presents today alert and agreeable to therapy and was supine in bed upon arrival. Patient transferred to standing and with NC O2 donned throughout session and ambulated 110ft with no AD with supervision and required one standing rest break apprx 10-15sec. Patient then took seated rest break. Respiratory entered room and patient declined further mobility asking PT to allow respiratory come into room for treatment. Patient left resting with call bell by her side and declined further need for assist.   Progression toward goals:   ?      Improving appropriately and progressing toward goals  ? Improving slowly and progressing toward goals  ? Not making progress toward goals and plan of care will be adjusted     PLAN:  Patient continues to benefit from skilled intervention to address the above impairments. Continue treatment per established plan of care. Discharge Recommendations:  Home Health  Further Equipment Recommendations for Discharge:  N/A     SUBJECTIVE:   Patient stated ? I think I'm feeling even better today. I'm supposed to home. ?    OBJECTIVE DATA SUMMARY:   Critical Behavior:  Neurologic State: Alert, Appropriate for age  Orientation Level: Oriented X4  Cognition: Follows commands  Safety/Judgement: Fall prevention, Awareness of environment  Functional Mobility Training:  Bed Mobility:   Scooting: Modified independent   Transfers:  Sit to Stand: Modified independent  Stand to Sit: Modified independent    Balance:  Sitting: Intact  Standing: Intact   Ambulation/Gait Training:  Distance (ft): 110 Feet (ft)(1 standing rest break)   Ambulation - Level of Assistance: Supervision   Gait Abnormalities: Decreased step clearance   Stairs - Level of Assistance: Supervision  Education:  ?         Bed mobility  ? Transfers  ? Ambulation  ? Assistive device management  ? Stairs  ? Body mechanics  ? Position change  ? Activity pacing/energy conservation  ? Other:     Pain:  Pain level pre-treatment: 0/10  Pain level post-treatment: 0/10     Activity Tolerance:   Patient tolerated activity well today and was left resting with call bell by her side. Please refer to the flowsheet for vital signs taken during this treatment. After treatment:   ? Patient left in no apparent distress sitting up in chair  ? Patient left in no apparent distress in bed  ? Call bell left within reach  ? Nursing notified  ? Caregiver present  ? Bed alarm activated  ? SCDs applied      COMMUNICATION/EDUCATION:   ?         Role of Physical Therapy in the acute care setting. ?         Fall prevention education was provided and the patient/caregiver indicated understanding. ? Patient/family have participated as able in working toward goals and plan of care. ?         Patient/family agree to work toward stated goals and plan of care. ?         Patient understands intent and goals of therapy, but is neutral about his/her participation. ?          Patient is unable to participate in stated goals/plan of care: ongoing with therapy staff.   ?         Other:        Annella Kocher, PT   Time Calculation: 18 mins

## 2019-05-31 NOTE — PROGRESS NOTES
Thank you for your referral for a front wheel walker-delivered equipment to patients room for DC home.     [address] Liaison  [facility]

## 2019-05-31 NOTE — PROGRESS NOTES
Problem: Falls - Risk of  Goal: *Absence of Falls  Description  Document Thomas Tye Fall Risk and appropriate interventions in the flowsheet.   Outcome: Progressing Towards Goal     Problem: Patient Education: Go to Patient Education Activity  Goal: Patient/Family Education  Outcome: Progressing Towards Goal     Problem: Pain  Goal: *Control of Pain  Outcome: Progressing Towards Goal

## 2019-05-31 NOTE — PROGRESS NOTES
Discharge order noted for today. Pt has been accepted to [address] agency. Met with patient  and is agreeable to the transition plan today. Transport has been arranged through family. Patient's discharge summary and home health  orders have been forwarded to [facility] home health  agency via cclink. Updated bedside RN, Zeferino Simental,  to the transition plan.   Discharge information has been documented on the AVS.       Ernestine Solomon RN BSN  Case Management  [phone]

## 2019-05-31 NOTE — ROUTINE PROCESS
1915 Assumed care of patient from off going nurse. Patient resting in bed. No distress noted. Call bell within reach, siderails up x 3, bed in lowest position, and patient instructed to use call bell for assistance. Will continue to monitor. 1329 Bedside and Verbal shift change report given to Dolly Mason 33 (oncoming nurse) by Ericka iVck RN(offgoing nurse).  Report included the following information Kardex, Intake/Output, MAR, Recent Results and Cardiac Rhythm SR.

## 2019-05-31 NOTE — DISCHARGE SUMMARY
Discharge Summary  [address] [facility]      Patient: Viv Villarreal Age: 60 y.o. Sex: female  : 1960    MRN: [id number]      DOA: 2019      Discharge Date: 2019      Attending:Zia Pedro MD      PCP: Jenae Elizabeth MD        ================================================================    Reason for Admission: COPD (chronic obstructive pulmonary disease) ([facility] [address] [J44.9]  COPD exacerbation ([facility] [address] [J44.1]  COPD exacerbation ([facility] [address] [J44.1]    Discharge Diagnoses:   Acute on Chronic COPD exacerbation  Chronic, Diastolic CHF  HTN  DM type 2  SHERRIE  GERD  Allergic rhinitis    Important notes to PCP/ follow-up studies and evaluations   1. Follow up for outpatient CPAP w/ Dr. Hector Wasserman. Pending labs and studies:  None    Operative Procedures:   None    Discharge Medications:     Current Discharge Medication List      START taking these medications    Details   levoFLOXacin (LEVAQUIN) 750 mg tablet Take 1 Tab by mouth every twenty-four (24) hours for 2 doses. Qty: 2 Tab, Refills: 0         CONTINUE these medications which have CHANGED    Details   ! ! predniSONE (DELTASONE) 10 mg tablet 1 tablet with breakfast every other morning  Qty: 30 Tab, Refills: 1      !! predniSONE (DELTASONE) 10 mg tablet Prednisone 10mg tabs: p.o.  2 tabs daily for 3 days then drop to   1 tabs daily for 2 days then drop to   Your regularly scheduled dose of 1 tab every other day. Dispense 10 tabs  Dispense 30 tabs  Indications: COPD exacerbation  Qty: 10 Tab, Refills: 0    Associated Diagnoses: COPD exacerbation ([facility] [address]       ! ! - Potential duplicate medications found. Please discuss with provider. CONTINUE these medications which have NOT CHANGED    Details   albuterol (PROVENTIL VENTOLIN) 2.5 mg /3 mL (0.083 %) nebulizer solution 3 mL by Nebulization route every four (4) hours as needed for Wheezing.  Indications: BRONCHOSPASM PREVENTION, Chronic Obstructive Pulmonary Disease  Qty: 30 Each, Refills: 0      roflumilast (DALIRESP) 250 mcg tab Take 1 Tab by mouth daily. for four weeks then call MD for further direction. Qty: 28 Tab, Refills: 0      mometasone/formoterol (DULERA IN) Take 1 Puff by inhalation daily. Indications: Bronchospasm Prevention with COPD, per pt      fluticasone propionate (FLONASE ALLERGY RELIEF) 50 mcg/actuation nasal spray 2 Sprays by Both Nostrils route daily. fluticasone propion-salmeterol (ADVAIR HFA) 230-21 mcg/actuation inhaler Take 2 Puffs by inhalation two (2) times a day. hydroCHLOROthiazide (HYDRODIURIL) 12.5 mg tablet Take 12.5 mg by mouth daily. tiotropium bromide (SPIRIVA RESPIMAT) 2.5 mcg/actuation inhaler Take 2 Puffs by inhalation daily. insulin glargine (LANTUS,BASAGLAR) 100 unit/mL (3 mL) inpn 35 Units by SubCUTAneous route nightly. Qty: 28 Units, Refills: 0      albuterol-ipratropium (DUO-NEB) 2.5 mg-0.5 mg/3 ml nebu 3 mL by Nebulization route every six (6) hours as needed. Qty: 30 Nebule, Refills: 0      albuterol sulfate 90 mcg/actuation aepb Take 1 Puff by inhalation every four (4) hours. Qty: 1 Inhaler, Refills: 0      NOVOLOG FLEXPEN U-100 INSULIN 100 unit/mL inpn Continue home Sliding scale insulin as prior to admission  Qty: 1 Pen, Refills: 0      omeprazole (PRILOSEC) 40 mg capsule Take 40 mg by mouth daily. Indications: gastroesophageal reflux disease      lisinopril (PRINIVIL, ZESTRIL) 40 mg tablet Take 20 mg by mouth two (2) times a day. Indications: high blood pressure      simethicone (MYLICON) 80 mg chewable tablet Take 80 mg by mouth every six (6) hours as needed for Flatulence. cpap machine kit by Does Not Apply route nightly. [employer] 2 L by Nasal route continuous. [facility]      aspirin delayed-release 81 mg tablet Take 81 mg by mouth daily. famotidine (PEPCID) 20 mg tablet Take 20 mg by mouth two (2) times daily as needed for Other (reflux).       montelukast (SINGULAIR) 10 mg tablet Take 10 mg by mouth nightly. Disposition: Home with Home Health    Consultants:    None    Brief Hospital Course (including pertinent history and physical findings)  Judy Menjivar is a 60 y.o. female with PMH COPD, diastolic CHF, DMT2, HTN, GERD, allergic rhinitis and SHERRIE, now admitted with COPD exacerbation.      COPD exacerbation   Patient presented with SOB, wheezing, and dry cough, that had been worsening at home for several days. She uses oxygen at home, 2L with activity. She believes her allergies may have started it. She then stopped using her CPAP qhs due to difficulty breathing and became even worse. Patient continued her home medications and her prn albuterol treatments, but it did not help significantly. No evidence of sepsis on presentation. In the ER, she was given albuterol nebs x2, solumedrol 60mg IV x1, Prednisone 60mg po, and Lasix 40mg IV x1 and started on BiPAP. She quickly came off BiPAP to nasal cannula. Once admitted, she was treated with scheduled Duo-nebs, levofloxacin 750mg daily, and prednisone 40mg x5 days. Patient clinically improved during hospitalization and was near her baseline on day of discharge. Patient HD stable upon discharge. Patient to continue Levaquin to finish 5 day course and prednisone taper.     DMT2   Elevated blood sugar while admitted, likely 2/2 to steroid use. Patient controlled on lantus 30 units at night and sliding scale insulin.     Diastolic CHF with preserved EF, HTN, GERD, Allergic rhinitis  Patient controlled on home medications or hospital equivalents (Lisinopril, HCTZ, ASA, Protonix, Flonase, Famotidine, Simethicone) while hospitalized.        Summarized key findings and results (labs, imaging studies, ECHO, cardiac cath, endoscopies, etc):    CBC w/Diff    Lab Results   Component Value Date/Time    WBC 7.8 05/31/2019 03:25 AM    HGB 11.7 (L) 05/31/2019 03:25 AM    HCT 35.7 05/31/2019 03:25 AM    PLATELET 604 13/09/8130 03:25 AM MCV 89.0 05/31/2019 03:25 AM           Chemistry    Lab Results   Component Value Date/Time    Sodium 138 05/31/2019 03:25 AM    Potassium 3.8 05/31/2019 03:25 AM    Chloride 102 05/31/2019 03:25 AM    CO2 31 05/31/2019 03:25 AM    Anion gap 5 05/31/2019 03:25 AM    Glucose 237 (H) 05/31/2019 03:25 AM    BUN 17 05/31/2019 03:25 AM    Creatinine 0.96 05/31/2019 03:25 AM    BUN/Creatinine ratio 18 05/31/2019 03:25 AM    GFR est AA >60 05/31/2019 03:25 AM    GFR est non-AA 60 (L) 05/31/2019 03:25 AM    Calcium 9.0 05/31/2019 03:25 AM         Xr Chest Port    Result Date: 5/27/2019  No acute finding. Functional status and cognitive function:    Ambulates without assistance.   Status: alert, cooperative, no distress, appears stated age    Diet:Cardiac/Diabetic    Code status and advanced care plan: Full  [facility] of Contact: Lisandrocarina Nevarez Relationship: Daughter [phone]        Patient Education:  Patient was educated on the following topics prior to discharge: COPD exacerbation    Follow-up:   Follow-up Information     Follow up With Specialties Details Why Contact Omar Gasca MD [facility] On 6/6/2019 Hospital Discharge Follow-up @ 3:40pm [address] [phone]              ================================================================    Yashira Osuna, [address] [facility] PGY-1  05/31/19 10:21 PM

## 2019-05-31 NOTE — PROGRESS NOTES
Medical Student Progress Note  [facility]       Patient: Ariana Colon MRN: [id number]  CSN: [id number]    YOB: 1960  Age: 60 y.o. Sex: female    DOA: 5/27/2019 LOS:  LOS: 4 days                    Subjective:     Acute events: No acute events overnight. VSS. Patient claims that she feels nearly back to her baseline, with improvement in her SOB and cough. Still notes mild wheezing. She feels ready to go home today. ROS + cough, wheezing, SOB. - CP, N, V, diarrhea, constipation, abdominal pain, headache, weakness, numbness. Objective:      Patient Vitals for the past 24 hrs:   Temp Pulse Resp BP SpO2   05/31/19 0602 98.3 °F (36.8 °C) 65 16 122/77 96 %   05/31/19 0231 97.3 °F (36.3 °C) 77 16 116/76 91 %   05/31/19 0040 -- -- -- -- 99 %   05/30/19 2227 98.3 °F (36.8 °C) 76 16 133/69 96 %   05/30/19 2001 -- -- -- -- 93 %   05/30/19 1800 97.2 °F (36.2 °C) 83 18 123/65 93 %   05/30/19 1710 -- 75 -- 116/66 --   05/30/19 1400 97.5 °F (36.4 °C) 79 18 107/63 96 %   05/30/19 0955 97.2 °F (36.2 °C) 77 18 112/64 94 %   05/30/19 0838 -- 73 -- 116/65 --         Intake/Output Summary (Last 24 hours) at 5/31/2019 0749  Last data filed at 5/31/2019 0011  Gross per 24 hour   Intake 820 ml   Output 1000 ml   Net -180 ml       Physical Exam:   General:  Alert and Responsive and in No acute distress. HEENT: Conjunctiva pink, sclera anicteric. EOMI. Pharynx moist, nonerythematous. Moist mucous membranes. Thyroid not enlarged, no nodules. No cervical, supraclavicular, occipital or submandibular lymphadenopathy. No other gross abnormalities present. CV:  RRR, no murmurs. No visible pulsations or thrills. RESP:  Unlabored breathing on 2L NC. Very mild late expiratory wheezes bilaterally, much improved. No rales or rhonchi. Equal expansion bilaterally. ABD:  Soft, nontender, nondistended. Freely mobile umbilical hernia without erythema, warmth, or tenderness.  No hepatosplenomegaly. No suprapubic tenderness. No CVA tenderness. RECTAL:  Deferred. MS:  No joint deformity or instability. No atrophy. Neuro:  5/5 strength bilateral upper extremities and lower extremities. A+Ox3. Ext:  No edema. 2+ radial and dp pulses bilaterally. Skin:  No rashes, lesions, or ulcers. Good turgor. Lab/Data Reviewed:  Recent Results (from the past 24 hour(s))   GLUCOSE, POC    Collection Time: 05/30/19  8:24 AM   Result Value Ref Range    Glucose (POC) 155 (H) 70 - 110 mg/dL   GLUCOSE, POC    Collection Time: 05/30/19 11:57 AM   Result Value Ref Range    Glucose (POC) 307 (H) 70 - 110 mg/dL   GLUCOSE, POC    Collection Time: 05/30/19  4:45 PM   Result Value Ref Range    Glucose (POC) 303 (H) 70 - 110 mg/dL   GLUCOSE, POC    Collection Time: 05/30/19  9:05 PM   Result Value Ref Range    Glucose (POC) 232 (H) 70 - 664 mg/dL   METABOLIC PANEL, BASIC    Collection Time: 05/31/19  3:25 AM   Result Value Ref Range    Sodium 138 136 - 145 mmol/L    Potassium 3.8 3.5 - 5.5 mmol/L    Chloride 102 100 - 108 mmol/L    CO2 31 21 - 32 mmol/L    Anion gap 5 3.0 - 18 mmol/L    Glucose 237 (H) 74 - 99 mg/dL    BUN 17 7.0 - 18 MG/DL    Creatinine 0.96 0.6 - 1.3 MG/DL    BUN/Creatinine ratio 18 12 - 20      GFR est AA >60 >60 ml/min/1.73m2    GFR est non-AA 60 (L) >60 ml/min/1.73m2    Calcium 9.0 8.5 - 10.1 MG/DL   CBC WITH AUTOMATED DIFF    Collection Time: 05/31/19  3:25 AM   Result Value Ref Range    WBC 7.8 4.6 - 13.2 K/uL    RBC 4.01 (L) 4.20 - 5.30 M/uL    HGB 11.7 (L) 12.0 - 16.0 g/dL    HCT 35.7 35.0 - 45.0 %    MCV 89.0 74.0 - 97.0 FL    MCH 29.2 24.0 - 34.0 PG    MCHC 32.8 31.0 - 37.0 g/dL    RDW 13.7 11.6 - 14.5 %    PLATELET 583 559 - 583 K/uL    MPV 9.1 (L) 9.2 - 11.8 FL    NEUTROPHILS 60 40 - 73 %    LYMPHOCYTES 34 21 - 52 %    MONOCYTES 6 3 - 10 %    EOSINOPHILS 0 0 - 5 %    BASOPHILS 0 0 - 2 %    ABS. NEUTROPHILS 4.7 1.8 - 8.0 K/UL    ABS. LYMPHOCYTES 2.6 0.9 - 3.6 K/UL    ABS. MONOCYTES 0.5 0.05 - 1.2 K/UL    ABS. EOSINOPHILS 0.0 0.0 - 0.4 K/UL    ABS. BASOPHILS 0.0 0.0 - 0.1 K/UL    DF AUTOMATED         Scheduled Medications Reviewed:  Current Facility-Administered Medications   Medication Dose Route Frequency    insulin glargine (LANTUS) injection 30 Units  30 Units SubCUTAneous QHS    enoxaparin (LOVENOX) injection 40 mg  40 mg SubCUTAneous Q24H    insulin lispro (HUMALOG) injection   SubCUTAneous AC&HS    albuterol-ipratropium (DUO-NEB) 2.5 MG-0.5 MG/3 ML  3 mL Nebulization Q4H RT    aspirin delayed-release tablet 81 mg  81 mg Oral DAILY    fluticasone propionate (FLONASE) 50 mcg/actuation nasal spray 2 Spray  2 Spray Both Nostrils DAILY    hydroCHLOROthiazide (HYDRODIURIL) tablet 12.5 mg  12.5 mg Oral DAILY    lisinopril (PRINIVIL, ZESTRIL) tablet 20 mg  20 mg Oral BID    montelukast (SINGULAIR) tablet 10 mg  10 mg Oral QHS    pantoprazole (PROTONIX) tablet 40 mg  40 mg Oral ACB    budesonide-formoterol (SYMBICORT) 160-4.5 mcg/actuation HFA inhaler 2 Puff  2 Puff Inhalation BID RT    levoFLOXacin (LEVAQUIN) tablet 750 mg  750 mg Oral Q24H    predniSONE (DELTASONE) tablet 40 mg  40 mg Oral DAILY WITH BREAKFAST         Imaging, microbiology, and EKG/Telemetry:  None    Assessment/Plan     Patient is a 59 yo female with PMH of COPD requiring 2L oxygen NC with activity, diastolic CHF, DMT2, HTN, GERD, allergic rhinitis, and SHERRIE on CPAP, who presents for management of COPD exacerbation.     Acute on Chronic COPD exacerbation  COPD may have been triggered by change in season vs allergies. Troponin, BNP and CXR normal on admission. On home 2L NC oxygen for activity. Patient did not improve after receiving steroids, albuterol nebs and lasix in the ER. Repeat ABG showed improvement in respiratory acidosis to 7.404, with an sO2 of 96% on 21%FiO2. Clinically improving as well.   - BiPAP at night  -Continue 2L NC during the day  - duonebs q4h with RT  - levofloxacin 750 mg daily for five days (5/27-5/31)  - Goal SpO2 88-92%  - prednisone 40 mg for five days (5/27-5/31)  - continue singular 10 mg daily  -continue Symbicort 160-4.5 mcg bid  - Hold advair 2 puffs BID  - Hold Spiriva 2 puffs daily  - PT/OT/CM     Diastolic CHF with preserved EF, HTN  Stable on exam. ECHO 7/2018 EF 66-70% with no wma. - Continue aspirin 81 mg po daily  - Vital signs per unit routine  - Continue lisinopril 40 mg po daily  - Continue HCTZ 12.5 mg po daily     DMT2   BG on admission 228 . Last HgbA1c 8 from 4/5/2019, was 8.2 on admission. Improved BG control with increased Lantus dose. Past 24 hours ranged 155-307. - Accuchecks achs  - SSI  - Lantus 30 units (home dose)  - Consistent carbohydrate diet      H/O SHERRIE  -hold CPAP qhs  -BiPAP     GERD, Allergic rhinitis  - Hold home omeprazole 40 mg po daily   - Continue protonix 40 mg po daily  - Continue home famotidine 20 mg po bid prn   - Continue home simethicone prn   - Continue flonase        Diet: Diabetic   DVT Prophylaxis: Lovenox  Code Status: Full  Point of Contact: Caryn (Relationship: Daughter), [phone]     Disposition: likely discharge today      [address] [facility], Medical Student  05/31/19 7:49 AM    *ATTENTION:  This note has been created by a medical student for educational purposes only. Please do not refer to the content of this note for clinical decision-making, billing, or other purposes. Please see attending physicians note to obtain clinical information on this patient. *

## 2019-05-31 NOTE — PROGRESS NOTES
Pt agreed to have [address] come to her house. FOC obtained for KIMBER COLEY [facility]. Alina from [facility].     Colin Sosa RN BSN  Case Management  [phone]

## 2019-05-31 NOTE — PROGRESS NOTES
Intern Progress Note  [facility]       Patient: Lynda Marshall MRN: [id number]  CSN: [id number]    YOB: 1960  Age: 60 y.o. Sex: female    DOA: 5/27/2019 LOS:  LOS: 4 days                    Subjective:     Acute events: No acute events  overnight. She was on BiPAP overnight. She is no longer short of breath at rest. Still having some SOB when walking, but she feels near her baseline. ROS: no fevers or chills, +cough w/ SOB and some wheezing, no chest pain, no abdominal pain/nausea/vomiting/diarrhea    Objective:      Patient Vitals for the past 24 hrs:   Temp Pulse Resp BP SpO2   05/31/19 0602 98.3 °F (36.8 °C) 65 16 122/77 96 %   05/31/19 0231 97.3 °F (36.3 °C) 77 16 116/76 91 %   05/31/19 0040 -- -- -- -- 99 %   05/30/19 2227 98.3 °F (36.8 °C) 76 16 133/69 96 %   05/30/19 2001 -- -- -- -- 93 %   05/30/19 1800 97.2 °F (36.2 °C) 83 18 123/65 93 %   05/30/19 1710 -- 75 -- 116/66 --   05/30/19 1400 97.5 °F (36.4 °C) 79 18 107/63 96 %   05/30/19 0955 97.2 °F (36.2 °C) 77 18 112/64 94 %         Intake/Output Summary (Last 24 hours) at 5/31/2019 0847  Last data filed at 5/31/2019 0011  Gross per 24 hour   Intake 580 ml   Output 1000 ml   Net -420 ml       Physical Exam:  General:  Alert and Responsive  HEENT: Conjunctiva pink, sclera anicteric. EOMI. No cervical, supraclavicular, occipital or submandibular lymphadenopathy. No other gross abnormalities present. CV:  RRR, no murmurs. No visible pulsations or thrills. RESP:  Expiratory wheezing present, no labored breathing. Equal expansion bilaterally. ABD:  Soft, nontender, nondistended. No hepatosplenomegaly. No suprapubic tenderness. MS:  No joint deformity or instability. No atrophy. Neuro:  moving upper extremities and lower extremities. Answers questions appropriately. Ext:  No edema. 2+ radial and dp pulses bilaterally. Skin:  No rashes, lesions, or ulcers. Good turgor. Lab/Data Reviewed:   All lab results for the last 24 hours reviewed. Scheduled Medications Reviewed:  Current Facility-Administered Medications   Medication Dose Route Frequency    insulin glargine (LANTUS) injection 30 Units  30 Units SubCUTAneous QHS    enoxaparin (LOVENOX) injection 40 mg  40 mg SubCUTAneous Q24H    insulin lispro (HUMALOG) injection   SubCUTAneous AC&HS    albuterol-ipratropium (DUO-NEB) 2.5 MG-0.5 MG/3 ML  3 mL Nebulization Q4H RT    aspirin delayed-release tablet 81 mg  81 mg Oral DAILY    fluticasone propionate (FLONASE) 50 mcg/actuation nasal spray 2 Spray  2 Spray Both Nostrils DAILY    hydroCHLOROthiazide (HYDRODIURIL) tablet 12.5 mg  12.5 mg Oral DAILY    lisinopril (PRINIVIL, ZESTRIL) tablet 20 mg  20 mg Oral BID    montelukast (SINGULAIR) tablet 10 mg  10 mg Oral QHS    pantoprazole (PROTONIX) tablet 40 mg  40 mg Oral ACB    budesonide-formoterol (SYMBICORT) 160-4.5 mcg/actuation HFA inhaler 2 Puff  2 Puff Inhalation BID RT    levoFLOXacin (LEVAQUIN) tablet 750 mg  750 mg Oral Q24H    predniSONE (DELTASONE) tablet 40 mg  40 mg Oral DAILY WITH BREAKFAST         Imaging, microbiology, and EKG/Telemetry:  Xr Chest Port    Result Date: 5/27/2019  No acute finding. Assessment/Plan   60 y.o. female with PMH  COPD (on 2L O2 NC with activity), diastolic CHF, DMT2, HTN, GERD, allergic rhinitis and SHERRIE on CPAP, now admitted with COPD exacerbation.     Acute on Chronic COPD exacerbation:  No history of asthma, it would be very unusual for this to be diagnosed as an adult after years of tobacco use. Improving now, on 2L of O2 on nasal cannula. On home 2L oxygen for activity. She is using the BiPAP qhs. She is comfortable with discharging home today.   - scheduled duonebs q4h with RT  - levofloxacin 750 mg daily for five days  - Goal SpO2 88-92%  - prednisone 40 mg for five days  - continue singular 10 mg daily  - PT/OT/CM     Diastolic CHF with preserved EF, HTN  Stable on exam. ECHO 7/2018 EF 66-70% with no wma. - Continue aspirin 81 mg po daily  - Continue lisinopril 40 mg po daily  - Continue HCTZ 12.5 mg po daily     DMT2   BG on admission 228. Last HgbA1c 8 from 4/5/2019, was 8.2 on admission.  Home Lantus dose is 30U qhs.  - SSI  -  On home Lantus 30U qhs  - Consistent carbohydrate diet      H/O SHERRIE  - hold CPAP qhs  - BIPAP     GERD, Allergic rhinitis  - Hold home omeprazole 40 mg po daily   - Continue protonix 40 mg po daily  - Continue home famotidine 20 mg po bid prn   - Continue home simethicone prn   - Continue flonase     Diet: Diabetic  DVT Prophylaxis: Lovenox  Code Status: Full  Point of Contact: Shaista Current [phone]     Disposition and anticipated LOS: Perla Wagner [address], [address] PGY-1  05/31/19 10:15 AM

## 2019-05-31 NOTE — DISCHARGE INSTRUCTIONS
GitCafe Activation    Thank you for requesting access to GitC[employer]. Please follow the instructions below to securely access and download your online medical record. GitCafe allows you to send messages to your doctor, view your test results, renew your prescriptions, schedule appointments, and more. How Do I Sign Up? 1. In your internet browser, go to [email or web address]  2. Click on the First Time User? Click Here link in the Sign In box. You will be redirect to the New Member Sign Up page. 3. Enter your GitCafe Access Code exactly as it appears below. You will not need to use this code after youve completed the sign-up process. If you do not sign up before the expiration date, you must request a new code. GitCafe Access Code: [email or web address]  Expires: 7/15/2019  1:10 PM (This is the date your GitCafe access code will )    4. Enter the last four digits of your Social Security Number (xxxx) and Date of Birth (mm/dd/yyyy) as indicated and click Submit. You will be taken to the next sign-up page. 5. Create a GitCafe ID. This will be your GitCafe login ID and cannot be changed, so think of one that is secure and easy to remember. 6. Create a GitCafe password. You can change your password at any time. 7. Enter your Password Reset Question and Answer. This can be used at a later time if you forget your password. 8. Enter your e-mail address. You will receive e-mail notification when new information is available in [address]. 9. Click Sign Up. You can now view and download portions of your medical record. 10. Click the Download Summary menu link to download a portable copy of your medical information. Additional Information    If you have questions, please visit the Frequently Asked Questions section of the GitCafe website at [email or web address] Remember, GitCafe is NOT to be used for urgent needs. For medical emergencies, dial 911.     Patient armband removed and shredded  DISCHARGE SUMMARY from Nurse    PATIENT INSTRUCTIONS:    After general anesthesia or intravenous sedation, for 24 hours or while taking prescription Narcotics:  · Limit your activities  · Do not drive and operate hazardous machinery  · Do not make important personal or business decisions  · Do  not drink alcoholic beverages  · If you have not urinated within 8 hours after discharge, please contact your surgeon on call. Report the following to your surgeon:  · Excessive pain, swelling, redness or odor of or around the surgical area  · Temperature over 100.5  · Nausea and vomiting lasting longer than 4 hours or if unable to take medications  · Any signs of decreased circulation or nerve impairment to extremity: change in color, persistent  numbness, tingling, coldness or increase pain  · Any questions    What to do at Home:  Recommended activity: PT/OT per Home Health, walker. If you experience any of the following symptoms shortness of breath not relieved by medications, weight gain of 2 pounds per day or 5 pounds  In week ,please follow up with primary care doctor. *  Please give a list of your current medications to your Primary Care Provider. *  Please update this list whenever your medications are discontinued, doses are      changed, or new medications (including over-the-counter products) are added. *  Please carry medication information at all times in case of emergency situations. These are general instructions for a healthy lifestyle:    No smoking/ No tobacco products/ Avoid exposure to second hand smoke  Surgeon General's Warning:  Quitting smoking now greatly reduces serious risk to your health.     Obesity, smoking, and sedentary lifestyle greatly increases your risk for illness    A healthy diet, regular physical exercise & weight monitoring are important for maintaining a healthy lifestyle    You may be retaining fluid if you have a history of heart failure or if you experience any of the following symptoms:  Weight gain of 3 pounds or more overnight or 5 pounds in a week, increased swelling in our hands or feet or shortness of breath while lying flat in bed. Please call your doctor as soon as you notice any of these symptoms; do not wait until your next office visit. Recognize signs and symptoms of STROKE:    F-face looks uneven    A-arms unable to move or move unevenly    S-speech slurred or non-existent    T-time-call 911 as soon as signs and symptoms begin-DO NOT go       Back to bed or wait to see if you get better-TIME IS BRAIN. Warning Signs of HEART ATTACK     Call 911 if you have these symptoms:   Chest discomfort. Most heart attacks involve discomfort in the center of the chest that lasts more than a few minutes, or that goes away and comes back. It can feel like uncomfortable pressure, squeezing, fullness, or pain.  Discomfort in other areas of the upper body. Symptoms can include pain or discomfort in one or both arms, the back, neck, jaw, or stomach.  Shortness of breath with or without chest discomfort.  Other signs may include breaking out in a cold sweat, nausea, or lightheadedness. Don't wait more than five minutes to call 911 - MINUTES MATTER! Fast action can save your life. Calling 911 is almost always the fastest way to get lifesaving treatment. Emergency Medical Services staff can begin treatment when they arrive -- up to an hour sooner than if someone gets to the hospital by car. The discharge information has been reviewed with the patient. The patient verbalized understanding. Discharge medications reviewed with the {[facility] meds reviewed [id number]} and appropriate educational materials and side effects teaching were provided.   ___________________________________________________________________________________________________________________________________  ACE Inhibitors: Care Instructions  Your Care Instructions    ACE (angiotensin-converting enzyme) inhibitors lower blood pressure. They also treat heart failure and prevent heart attacks and strokes. They block an enzyme that makes blood vessels narrow. As a result, the blood vessels relax and widen. This lowers blood pressure. These medicines also put more water and salt into the urine. This lowers blood pressure too. These medicines are a good choice for people with diabetes. They don't affect blood sugar levels, and they may protect the kidneys. Examples include:  · Benazepril (Lotensin). · Lisinopril (Prinivil, Zestril). · Ramipril (Altace). Before you start taking this medicine, make sure your doctor knows if you take other medicines, especially water pills (diuretics) or potassium tablets. And tell your doctor if you use a salt substitute. You should not take an ACE inhibitor if you are pregnant or planning to become pregnant. You may need regular blood tests. Follow-up care is a key part of your treatment and safety. Be sure to make and go to all appointments, and call your doctor if you are having problems. It's also a good idea to know your test results and keep a list of the medicines you take. How can you care for yourself at home? · Take your medicines exactly as prescribed. Be sure to take high blood pressure medicine every day. Since high blood pressure often has no symptoms, it is easy to forget to take the pills. Call your doctor if you think you are having a problem with your medicine. · ACE inhibitors can cause a dry cough. If the cough is bad, talk to your doctor. You may need to try a different medicine. · ACE inhibitors can cause an allergic reaction of the skin. Symptoms may range from mild swelling to painful welts. Severe swelling can make it hard to breathe, but this is very rare. · Check with your doctor or pharmacist before you use any other medicines. This includes over-the-counter medicines.  Make sure your doctor knows all of the medicines, vitamins, herbal products, and supplements you take. Taking some medicines together can cause problems. When should you call for help? Call your doctor now or seek immediate medical care if:    · You develop a new cough.     · You think you are having problems with your medicine.    Watch closely for changes in your health, and be sure to contact your doctor if you have any problems. Where can you learn more? Go to http://etelvina-olivier.info/. Enter O267 in the search box to learn more about \"ACE Inhibitors: Care Instructions. \"  Current as of: July 22, 2018  Content Version: 11.9  © 5008-3580 [employer]. Care instructions adapted under license by [employer] (which disclaims liability or warranty for this information). If you have questions about a medical condition or this instruction, always ask your healthcare professional. [address] any warranty or liability for your use of this information. Learning About ARBs  Introduction    ARBs (angiotensin II receptor blockers) block a hormone that makes blood vessels narrow. As a result, the blood vessels relax and widen. This lowers blood pressure. ARBs also put more water and salt into the urine. This also lowers blood pressure. ARBs can treat:  · High blood pressure. · Coronary artery disease. · Heart failure. They also may be used to help your kidneys when you have diabetes. Examples  ARBs include:  · Candesartan (Atacand). · Irbesartan (Avapro). · Losartan (Cozaar). This is not a complete list of all ARBs. Possible side effects  Side effects may include:  · Low blood pressure. You may feel dizzy and weak. · Diarrhea. · High potassium levels. You may have other side effects or reactions not listed here. Check the information that comes with your medicine. What to know about taking this medicine  · ARBs may be used if you had a cough when you tried to take an ACE inhibitor. ARBs are less likely to cause a cough. · You may need regular blood tests. · Take your medicines exactly as prescribed. Call your doctor if you think you are having a problem with your medicine. · Tell your doctor or pharmacist all the medicines you take. This includes over-the-counter medicines, vitamins, herbal products, and supplements. Taking some medicines together can cause problems. · You should not take ARBs if you are pregnant or planning to become pregnant. Where can you learn more? Go to http://etelvina-olivier.info/. Enter K212 in the search box to learn more about \"Learning About ARBs. \"  Current as of: July 22, 2018  Content Version: 11.9  © 1729-4740 [employer]. Care instructions adapted under license by [employer]ox (which disclaims liability or warranty for this information). If you have questions about a medical condition or this instruction, always ask your healthcare professional. [place] [address] any warranty or liability for your use of this information. Allergies: Care Instructions  Your Care Instructions    Allergies occur when your body's defense system (immune system) overreacts to certain substances. The immune system treats a harmless substance as if it were a harmful germ or virus. Many things can cause this overreaction, including pollens, medicine, food, dust, animal dander, and mold. Allergies can be mild or severe. Mild allergies can be managed with home treatment. But medicine may be needed to prevent problems. Managing your allergies is an important part of staying healthy. Your doctor may suggest that you have allergy testing to help find out what is causing your allergies. When you know what things trigger your symptoms, you can avoid them. This can prevent allergy symptoms and other health problems.   For severe allergies that cause reactions that affect your whole body (anaphylactic reactions), your doctor may prescribe a shot of epinephrine to carry with you in case you have a severe reaction. Learn how to give yourself the shot and keep it with you at all times. Make sure it is not . Follow-up care is a key part of your treatment and safety. Be sure to make and go to all appointments, and call your doctor if you are having problems. It's also a good idea to know your test results and keep a list of the medicines you take. How can you care for yourself at home? · If you have been told by your doctor that dust or dust mites are causing your allergy, decrease the dust around your bed:  ? Wash sheets, pillowcases, and other bedding in hot water every week. ? Use dust-proof covers for pillows, duvets, and mattresses. Avoid plastic covers because they tear easily and do not \"breathe. \" Wash as instructed on the label. ? Do not use any blankets and pillows that you do not need. ? Use blankets that you can wash in your washing machine. ? Consider removing drapes and carpets, which attract and hold dust, from your bedroom. · If you are allergic to house dust and mites, do not use home humidifiers. Your doctor can suggest ways you can control dust and mites. · Look for signs of cockroaches. Cockroaches cause allergic reactions. Use cockroach baits to get rid of them. Then, clean your home well. Cockroaches like areas where grocery bags, newspapers, empty bottles, or cardboard boxes are stored. Do not keep these inside your home, and keep trash and food containers sealed. Seal off any spots where cockroaches might enter your home. · If you are allergic to mold, get rid of furniture, rugs, and drapes that smell musty. Check for mold in the bathroom. · If you are allergic to outdoor pollen or mold spores, use air-conditioning. Change or clean all filters every month. Keep windows closed. · If you are allergic to pollen, stay inside when pollen counts are high.  Use a vacuum  with a HEPA filter or a double-thickness filter at least two times each week. · Stay inside when air pollution is bad. Avoid paint fumes, perfumes, and other strong odors. · Avoid conditions that make your allergies worse. Stay away from smoke. Do not smoke or let anyone else smoke in your house. Do not use fireplaces or wood-burning stoves. · If you are allergic to your pets, change the air filter in your furnace every month. Use high-efficiency filters. · If you are allergic to pet dander, keep pets outside or out of your bedroom. Old carpet and cloth furniture can hold a lot of animal dander. You may need to replace them. When should you call for help? Give an epinephrine shot if:    · You think you are having a severe allergic reaction.     · You have symptoms in more than one body area, such as mild nausea and an itchy mouth.    After giving an epinephrine shot call 911, even if you feel better.   Call 911 if:    · You have symptoms of a severe allergic reaction. These may include:  ? Sudden raised, red areas (hives) all over your body. ? Swelling of the throat, mouth, lips, or tongue. ? Trouble breathing. ? Passing out (losing consciousness). Or you may feel very lightheaded or suddenly feel weak, confused, or restless.     · You have been given an epinephrine shot, even if you feel better.    Call your doctor now or seek immediate medical care if:    · You have symptoms of an allergic reaction, such as:  ? A rash or hives (raised, red areas on the skin). ? Itching. ? Swelling. ? Belly pain, nausea, or vomiting.    Watch closely for changes in your health, and be sure to contact your doctor if:    · You do not get better as expected. Where can you learn more? Go to http://etelvina-olivier.info/. Enter G902 in the search box to learn more about \"Allergies: Care Instructions. \"  Current as of: June 27, 2018  Content Version: 11.9  © 5466-6696 [employer].  Care instructions adapted under license by [address] (which disclaims liability or warranty for this information). If you have questions about a medical condition or this instruction, always ask your healthcare professional. [address] any warranty or liability for your use of this information. Chronic Obstructive Pulmonary Disease (COPD): Care Instructions  Your Care Instructions    Chronic obstructive pulmonary disease (COPD) is a general term for a group of lung diseases, including emphysema and chronic bronchitis. People with COPD have decreased airflow in and out of the lungs, which makes it hard to breathe. The airways also can get clogged with thick mucus. Cigarette smoking is a major cause of COPD. Although there is no cure for COPD, you can slow its progress. Following your treatment plan and taking care of yourself can help you feel better and live longer. Follow-up care is a key part of your treatment and safety. Be sure to make and go to all appointments, and call your doctor if you are having problems. It's also a good idea to know your test results and keep a list of the medicines you take. How can you care for yourself at home?   Staying healthy    · Do not smoke. This is the most important step you can take to prevent more damage to your lungs. If you need help quitting, talk to your doctor about stop-smoking programs and medicines. These can increase your chances of quitting for good.     · Avoid colds and flu. Get a pneumococcal vaccine shot. If you have had one before, ask your doctor whether you need a second dose. Get the flu vaccine every fall. If you must be around people with colds or the flu, wash your hands often.     · Avoid secondhand smoke, air pollution, and high altitudes. Also avoid cold, dry air and hot, humid air. Stay at home with your windows closed when air pollution is bad.    Medicines and oxygen therapy    · Take your medicines exactly as prescribed.  Call your doctor if you think you are having a problem with your medicine.     · You may be taking medicines such as:  ? Bronchodilators. These help open your airways and make breathing easier. Bronchodilators are either short-acting (work for 6 to 9 hours) or long-acting (work for 24 hours). You inhale most bronchodilators, so they start to act quickly. Always carry your quick-relief inhaler with you in case you need it while you are away from home. ? Corticosteroids (prednisone, budesonide). These reduce airway inflammation. They come in pill or inhaled form. You must take these medicines every day for them to work well.     · A spacer may help you get more inhaled medicine to your lungs. Ask your doctor or pharmacist if a spacer is right for you. If it is, ask how to use it properly.     · Do not take any vitamins, over-the-counter medicine, or herbal products without talking to your doctor first.     · If your doctor prescribed antibiotics, take them as directed. Do not stop taking them just because you feel better. You need to take the full course of antibiotics.     · Oxygen therapy boosts the amount of oxygen in your blood and helps you breathe easier. Use the flow rate your doctor has recommended, and do not change it without talking to your doctor first.   Activity    · Get regular exercise. Walking is an easy way to get exercise. Start out slowly, and walk a little more each day.     · Pay attention to your breathing. You are exercising too hard if you cannot talk while you are exercising.     · Take short rest breaks when doing household chores and other activities.     · Learn breathing methods--such as breathing through pursed lips--to help you become less short of breath.     · If your doctor has not set you up with a pulmonary rehabilitation program, talk to him or her about whether rehab is right for you.  Rehab includes exercise programs, education about your disease and how to manage it, help with diet and other changes, and emotional support. Diet    · Eat regular, healthy meals. Use bronchodilators about 1 hour before you eat to make it easier to eat. Eat several small meals instead of three large ones. Drink beverages at the end of the meal. Avoid foods that are hard to chew.     · Eat foods that contain protein so that you do not lose muscle mass.     · Talk with your doctor if you gain too much weight or if you lose weight without trying.    Mental health    · Talk to your family, friends, or a therapist about your feelings. It is normal to feel frightened, angry, hopeless, helpless, and even guilty. Talking openly about bad feelings can help you cope. If these feelings last, talk to your doctor. When should you call for help? Call 911 anytime you think you may need emergency care. For example, call if:    · You have severe trouble breathing.    Call your doctor now or seek immediate medical care if:    · You have new or worse trouble breathing.     · You cough up blood.     · You have a fever.    Watch closely for changes in your health, and be sure to contact your doctor if:    · You cough more deeply or more often, especially if you notice more mucus or a change in the color of your mucus.     · You have new or worse swelling in your legs or belly.     · You are not getting better as expected. Where can you learn more? Go to http://etelvina-olivier.info/. Kipp [place] in the search box to learn more about \"Chronic Obstructive Pulmonary Disease (COPD): Care Instructions. \"  Current as of: September 5, 2018  Content Version: 11.9  © 2794-1819 [employer]. Care instructions adapted under license by [employer] (which disclaims liability or warranty for this information).  If you have questions about a medical condition or this instruction, always ask your healthcare professional. [place] [address] any warranty or liability for your use of this information. COPD and Asthma: Care Instructions  Your Care Instructions    Some people who have chronic obstructive pulmonary disease (COPD) also have asthma. Both of these problems can damage your lungs. This makes it very important to control them. Asthma causes the airways that lead to the lungs to swell and become narrow. This makes it hard to breathe. You may wheeze or cough. If you have a bad attack, you may need emergency care. There are two parts to treating asthma. · Controlling asthma over the long term. · Treating attacks when they occur. You and your doctor can make an asthma treatment plan that will help. This plan tells you the medicines you take every day to reduce the swelling in your airways and prevent attacks. It also tells you what to do if you have an asthma attack. Follow-up care is a key part of your treatment and safety. Be sure to make and go to all appointments, and call your doctor if you are having problems. It's also a good idea to know your test results and keep a list of the medicines you take. How can you care for yourself at home? To control asthma over the long term  Medicines  Controller medicines reduce swelling in your lungs. They also prevent asthma attacks. Take your controller medicine exactly as prescribed. Talk to your doctor if you have any problems with your medicine. · Inhaled corticosteroid is a common and effective controller medicine. Using it the right way can prevent or reduce most side effects. · Take your controller medicine every day, not just when you have symptoms. This helps prevent problems before they occur. · Always bring your asthma medicine with you when you travel. · Your doctor may prescribe long-acting medicine that combines a corticosteroid with a beta2-agonist. Follow your doctor's instructions exactly about how to take a long-acting medicine. Examples include:  ? Fluticasone and salmeterol (Advair).   ? Budesonide and formoterol (Symbicort). · Do not depend on your controller medicines to stop an asthma attack that has already started. They do not work fast enough to help. · Your doctor may also prescribe anticholinergic inhalers. These include ipratropium (Atrovent) and tiotropium (Spiriva). Education  · Learn what sets off an asthma attack. Avoid these triggers when you can. Common triggers include smoke, air pollution, pollen, animal dander, colds, stress, and cold air. · Do not smoke. Smoking can make COPD and asthma worse. If you need help quitting, talk to your doctor about stop-smoking programs and medicines. These can increase your chances of quitting for good. · Check yourself for symptoms to know which step to follow in your action plan. Watch for things like being short of breath, having chest tightness, coughing, and wheezing. Also notice if symptoms wake you up at night or if you get tired quickly when you exercise. · You may want to learn how to use a peak flow meter. This measures how open your airways are. It may help you know when you will have an asthma attack. To treat attacks when they occur  Use your asthma action plan when you have an attack. Your quick-relief medicine, such as albuterol, will stop an asthma attack. It relaxes the muscles that get tight around the airways. · Take your quick-relief medicine exactly as prescribed. Talk with your doctor if you have any problems with your medicine. · Keep this medicine with you at all times. · You may need to use this medicine before you exercise. If your doctor prescribed corticosteroid pills to use during an attack, take them as directed. They may take hours to work, but they may shorten the attack and help you breathe better. When should you call for help? Call 911 anytime you think you may need emergency care.  For example, call if:    · You have severe trouble breathing.    Call your doctor now or seek immediate medical care if:    · You have new or worse shortness of breath.     · You are coughing more deeply or more often, especially if you notice more mucus or a change in the color of your mucus.     · You cough up blood.     · You have new or increased swelling in your legs or belly.     · You have a fever.     · You have used your quick-relief medicine but you are still short of breath.    Watch closely for changes in your health, and be sure to contact your doctor if you have any problems. Where can you learn more? Go to http://etelvina-olivier.info/. Enter A350 in the search box to learn more about \"COPD and Asthma: Care Instructions. \"  Current as of: September 5, 2018  Content Version: 11.9  © 0716-6376 [employer]. Care instructions adapted under license by [employer] (which disclaims liability or warranty for this information). If you have questions about a medical condition or this instruction, always ask your healthcare professional. [address] any warranty or liability for your use of this information. Learning About COPD Triggers  What are triggers? When you have COPD (chronic obstructive pulmonary disease), certain things can make your symptoms worse. These are called triggers. They include:  · Cigarette smoke or air pollution. · Illnesses like colds, flu, or pneumonia. · Cleaning supplies or other chemicals. · Gases, particles, or fumes from wood or kerosene home heaters. Not all people have the same triggers. What may cause symptoms in one person may not be a problem for another person. How do triggers affect COPD? Triggers can make it harder for your lungs to work as they should and can lead to sudden difficulty breathing and other symptoms. When you are around a trigger, a COPD flare-up is more likely. If your symptoms are severe, you may need emergency treatment or have to go to the hospital for treatment.   If you know what your triggers are and can avoid them, you can reduce how often you have flare-ups and how much COPD affects your life. It's also important to be active and to take your daily medicines as prescribed. This helps prevent flare-ups too. What can you do to avoid triggers? The first thing is to know your triggers. When you are having symptoms, note the things around you that might be causing them. Then look for patterns in what may be triggering your symptoms. When you have your list of possible triggers, work with your doctor to find ways to avoid them. Here are some ways to avoid a few common triggers. · Do not smoke or allow others to smoke around you. If you need help quitting, talk to your doctor about stop-smoking programs and medicines. These can increase your chances of quitting for good. · If there is a lot of pollution, pollen, or dust outside, stay at home and keep your windows closed. Use an air conditioner or air filter in your home. Check your local weather report or newspaper for air quality and pollen reports. · Get a flu vaccine every year. Talk to your doctor about getting a pneumococcal shot. Wash your hands often to prevent infections. How can you manage a flare-up? Do not panic if you start to have a COPD flare-up. · If you have a COPD action plan, follow the plan. In general:  ? Use your quick-relief inhaler as directed by your doctor. If your symptoms do not get better after you use your medicine, have someone take you to the emergency room. Call an ambulance if needed. ? Use a spacer with your metered-dose inhaler (MDI). If you have a nebulizer for inhaled medicine, use it. A spacer or nebulizer may help get more medicine to your lungs. ? If your doctor has given you other inhaled medicines or steroid pills, take them as directed. ? If your doctor has given you a prescription for an antibiotic, fill it if you need to.  ? Call your doctor if you have to use your antibiotic or steroid pills. Where can you learn more?   Go to http://etelvina-olivier.info/. Enter T287 in the search box to learn more about \"Learning About COPD Triggers. \"  Current as of: September 5, 2018  Content Version: 11.9  © 6796-1560 [employer]. Care instructions adapted under license by [employer] (which disclaims liability or warranty for this information). If you have questions about a medical condition or this instruction, always ask your healthcare professional. [address] any warranty or liability for your use of this information. Learning About COPD and How to Prevent Lung Infections  How do lung infections affect COPD? Lung infections like pneumonia and acute bronchitis are common causes of COPD flare-ups. And people who have COPD are more likely to get these lung infections, especially if they smoke. When you have COPD, it is important to know the symptoms of pneumonia and acute bronchitis and call your doctor if you have them. Symptoms include:  · A cough that brings up more mucus than usual.  · Fever. · Shortness of breath. What can you do to prevent these infections? Stay healthy  · Get a flu shot every year. · Get a pneumococcal vaccine shot. If you have had one before, ask your doctor whether you need another dose. Two different types of pneumococcal vaccines are recommended for people ages 72 and older. · If you must be around people with colds or the flu, wash your hands often. · Do not smoke. This is the most important step you can take to prevent more damage to your lungs. If you need help quitting, talk to your doctor about stop-smoking programs and medicines. These can increase your chances of quitting for good. · Avoid secondhand smoke, air pollution, and high altitudes. Also avoid cold, dry air and hot, humid air. Stay at home with your windows closed when air pollution is bad. Exercise and eat well  · If your doctor recommends it, get more exercise.  Walking is a good choice. Bit by bit, increase the amount you walk every day. Try for at least 30 minutes on most days of the week. · Eat regular, well-balanced meals. Eating right keeps your energy levels up and helps your body fight infection. · Get plenty of rest and sleep. Follow-up care is a key part of your treatment and safety. Be sure to make and go to all appointments, and call your doctor if you are having problems. It's also a good idea to know your test results and keep a list of the medicines you take. Where can you learn more? Go to http://etelvina-olivier.info/. Enter T721 in the search box to learn more about \"Learning About COPD and How to Prevent Lung Infections. \"  Current as of: September 5, 2018  Content Version: 11.9  © 2946-8127 [employer]. Care instructions adapted under license by [employer] (which disclaims liability or warranty for this information). If you have questions about a medical condition or this instruction, always ask your healthcare professional. [place] [address] any warranty or liability for your use of this information. Chest Pain: Care Instructions  Your Care Instructions    There are many things that can cause chest pain. Some are not serious and will get better on their own in a few days. But some kinds of chest pain need more testing and treatment. Your doctor may have recommended a follow-up visit in the next 8 to 12 hours. If you are not getting better, you may need more tests or treatment. Even though your doctor has released you, you still need to watch for any problems. The doctor carefully checked you, but sometimes problems can develop later. If you have new symptoms or if your symptoms do not get better, get medical care right away.   If you have worse or different chest pain or pressure that lasts more than 5 minutes or you passed out (lost consciousness), call 911 or seek other emergency help right away.   A medical visit is only one step in your treatment. Even if you feel better, you still need to do what your doctor recommends, such as going to all suggested follow-up appointments and taking medicines exactly as directed. This will help you recover and help prevent future problems. How can you care for yourself at home? · Rest until you feel better. · Take your medicine exactly as prescribed. Call your doctor if you think you are having a problem with your medicine. · Do not drive after taking a prescription pain medicine. When should you call for help? Call 911 if:    · You passed out (lost consciousness).     · You have severe difficulty breathing.     · You have symptoms of a heart attack. These may include:  ? Chest pain or pressure, or a strange feeling in your chest.  ? Sweating. ? Shortness of breath. ? Nausea or vomiting. ? Pain, pressure, or a strange feeling in your back, neck, jaw, or upper belly or in one or both shoulders or arms. ? Lightheadedness or sudden weakness. ? A fast or irregular heartbeat. After you call 911, the  may tell you to chew 1 adult-strength or 2 to 4 low-dose aspirin. Wait for an ambulance. Do not try to drive yourself.    Call your doctor today if:    · You have any trouble breathing.     · Your chest pain gets worse.     · You are dizzy or lightheaded, or you feel like you may faint.     · You are not getting better as expected.     · You are having new or different chest pain. Where can you learn more? Go to http://etelvina-olivier.info/. Enter A120 in the search box to learn more about \"Chest Pain: Care Instructions. \"  Current as of: September 23, 2018  Content Version: 11.9  © 1007-6712 [employer]. Care instructions adapted under license by [employer] (which disclaims liability or warranty for this information).  If you have questions about a medical condition or this instruction, always ask your healthcare professional. [address] any warranty or liability for your use of this information. Learning About CPAP for Sleep Apnea  What is CPAP? CPAP is a small machine that you use at home every night while you sleep. It increases air pressure in your throat to keep your airway open. When you have sleep apnea, this can help you sleep better so you feel much better. CPAP stands for \"continuous positive airway pressure. \"  The CPAP machine will have one of the following:  · A mask that covers your nose and mouth  · Prongs that fit into your nose  · A mask that covers your nose only, the most common type. This type is called NCPAP. The N stands for \"nasal.\"  Why is it done? CPAP is usually the best treatment for obstructive sleep apnea. It is the first treatment choice and the most widely used. Your doctor may suggest CPAP if you have:  · Moderate to severe sleep apnea. · Sleep apnea and coronary artery disease (CAD). · Sleep apnea and heart failure. How does it help? · CPAP can help you have more normal sleep, so you feel less sleepy and more alert during the daytime. · CPAP may help keep heart failure or other heart problems from getting worse. · CPAP may help lower your blood pressure. · If you use CPAP, your bed partner may also sleep better because you are not snoring or restless. What are the side effects? Some people who use CPAP have:  · A dry or stuffy nose and a sore throat. · Irritated skin on the face. · Sore eyes. · Bloating. If you have any of these problems, work with your doctor to fix them. Here are some things you can try:  · Be sure the mask or nasal prongs fit well. · See if your doctor can adjust the pressure of your CPAP. · If your nose is dry, try a humidifier. · If your nose is runny or stuffy, try decongestant medicine or a steroid nasal spray. Be safe with medicines. Read and follow all instructions on the label.  Do not use the medicine longer than the label says. If these things do not help, you might try a different type of machine. Some machines have air pressure that adjusts on its own. Others have air pressures that are different when you breathe in than when you breathe out. This may reduce discomfort caused by too much pressure in your nose. Where can you learn more? Go to http://etelvina-olivier.info/. Enter M071 in the search box to learn more about \"Learning About CPAP for Sleep Apnea. \"  Current as of: September 5, 2018  Content Version: 11.9  © 8244-0109 [employer]. Care instructions adapted under license by [employer] (which disclaims liability or warranty for this information). If you have questions about a medical condition or this instruction, always ask your healthcare professional. [place] [address] any warranty or liability for your use of this information. Learning About Certified Diabetes Educators  What is a certified diabetes educator? Certified diabetes educators are health professionals who have special training to help you manage your diabetes. They may be:  · Registered nurses. · Registered dietitians. · Pharmacists. · Social workers. · Doctors. Your diabetes educator will give you tips and help with daily diabetes care. He or she also may teach classes. These classes give you a chance to learn from and connect with others who have diabetes. Why see a diabetes educator? Your doctor wants you to get the personal support and help that a diabetes educator can give. And most insurance plans will cover part or all of the cost.  Learning is a key part of living with diabetes. A diabetes educator teaches about the most important parts of your care. You will learn about:  · Eating healthy meals. · Being active. · Taking medicine. · Checking your blood sugar. · Dealing with your feelings about having diabetes.   He or she can help you find ways to live better with diabetes. And your diabetes educator can show you small changes that can make a big difference in your daily routine and your health. If you need to make a big change, he or she will be able to answer your questions and guide you though each step. When should you see one? It can be helpful to see a diabetes educator at certain points in your care. He or she can:  · Get you started when you're first diagnosed with diabetes. · Check in once a year for a review of your health and daily routine. · Show you how to handle a new health problem along with your diabetes. · Help you work with a new health care team.  Follow-up care is a key part of your treatment and safety. Be sure to make and go to all appointments, and call your doctor if you are having problems. It's also a good idea to know your test results and keep a list of the medicines you take. Where can you learn more? Go to http://etelvinaHeadplayolivier.info/. Enter U359 in the search box to learn more about \"Learning About Certified Diabetes Educators. \"  Current as of: July 25, 2018  Content Version: 11.9  © 5473-4143 [employer]. Care instructions adapted under license by [employer] (which disclaims liability or warranty for this information). If you have questions about a medical condition or this instruction, always ask your healthcare professional. [address] any warranty or liability for your use of this information. Learning About Diabetes Food Guidelines  Your Care Instructions    Meal planning is important to manage diabetes. It helps keep your blood sugar at a target level (which you set with your doctor). You don't have to eat special foods. You can eat what your family eats, including sweets once in a while. But you do have to pay attention to how often you eat and how much you eat of certain foods.   You may want to work with a dietitian or a certified diabetes educator (CDE) to help you plan meals and snacks. A dietitian or CDE can also help you lose weight if that is one of your goals. What should you know about eating carbs? Managing the amount of carbohydrate (carbs) you eat is an important part of healthy meals when you have diabetes. Carbohydrate is found in many foods. · Learn which foods have carbs. And learn the amounts of carbs in different foods. ? Bread, cereal, pasta, and rice have about 15 grams of carbs in a serving. A serving is 1 slice of bread (1 ounce), ½ cup of cooked cereal, or 1/3 cup of cooked pasta or rice. ? Fruits have 15 grams of carbs in a serving. A serving is 1 small fresh fruit, such as an apple or orange; ½ of a banana; ½ cup of cooked or canned fruit; ½ cup of fruit juice; 1 cup of melon or raspberries; or 2 tablespoons of dried fruit. ? Milk and no-sugar-added yogurt have 15 grams of carbs in a serving. A serving is 1 cup of milk or 2/3 cup of no-sugar-added yogurt. ? Starchy vegetables have 15 grams of carbs in a serving. A serving is ½ cup of mashed potatoes or sweet potato; 1 cup winter squash; ½ of a small baked potato; ½ cup of cooked beans; or ½ cup cooked corn or green peas. · Learn how much carbs to eat each day and at each meal. A dietitian or CDE can teach you how to keep track of the amount of carbs you eat. This is called carbohydrate counting. · If you are not sure how to count carbohydrate grams, use the Plate Method to plan meals. It is a good, quick way to make sure that you have a balanced meal. It also helps you spread carbs throughout the day. ? Divide your plate by types of foods. Put non-starchy vegetables on half the plate, meat or other protein food on one-quarter of the plate, and a grain or starchy vegetable in the final quarter of the plate.  To this you can add a small piece of fruit and 1 cup of milk or yogurt, depending on how many carbs you are supposed to eat at a meal.  · Try to eat about the same amount of carbs at each meal. Do not \"save up\" your daily allowance of carbs to eat at one meal.  · Proteins have very little or no carbs per serving. Examples of proteins are beef, chicken, turkey, fish, eggs, tofu, cheese, cottage cheese, and peanut butter. A serving size of meat is 3 ounces, which is about the size of a deck of cards. Examples of meat substitute serving sizes (equal to 1 ounce of meat) are 1/4 cup of cottage cheese, 1 egg, 1 tablespoon of peanut butter, and ½ cup of tofu. How can you eat out and still eat healthy? · Learn to estimate the serving sizes of foods that have carbohydrate. If you measure food at home, it will be easier to estimate the amount in a serving of restaurant food. · If the meal you order has too much carbohydrate (such as potatoes, corn, or baked beans), ask to have a low-carbohydrate food instead. Ask for a salad or green vegetables. · If you use insulin, check your blood sugar before and after eating out to help you plan how much to eat in the future. · If you eat more carbohydrate at a meal than you had planned, take a walk or do other exercise. This will help lower your blood sugar. What else should you know? · Limit saturated fat, such as the fat from meat and dairy products. This is a healthy choice because people who have diabetes are at higher risk of heart disease. So choose lean cuts of meat and nonfat or low-fat dairy products. Use olive or canola oil instead of butter or shortening when cooking. · Don't skip meals. Your blood sugar may drop too low if you skip meals and take insulin or certain medicines for diabetes. · Check with your doctor before you drink alcohol. Alcohol can cause your blood sugar to drop too low. Alcohol can also cause a bad reaction if you take certain diabetes medicines. Follow-up care is a key part of your treatment and safety. Be sure to make and go to all appointments, and call your doctor if you are having problems.  It's also a good idea to know your test results and keep a list of the medicines you take. Where can you learn more? Go to http://etelvina-olivier.info/. Enter G054 in the search box to learn more about \"Learning About Diabetes Food Guidelines. \"  Current as of: July 25, 2018  Content Version: 11.9  © 2006-2018 [employer]. Care instructions adapted under license by [employer] (which disclaims liability or warranty for this information). If you have questions about a medical condition or this instruction, always ask your healthcare professional. [place] [address] any warranty or liability for your use of this information. Type 2 Diabetes: Care Instructions  Your Care Instructions    Type 2 diabetes is a disease that develops when the body's tissues cannot use insulin properly. Over time, the pancreas cannot make enough insulin. Insulin is a hormone that helps the body's cells use sugar (glucose) for energy. It also helps the body store extra sugar in muscle, fat, and liver cells. Without insulin, the sugar cannot get into the cells to do its work. It stays in the blood instead. This can cause high blood sugar levels. A person has diabetes when the blood sugar stays too high too much of the time. Over time, diabetes can lead to diseases of the heart, blood vessels, nerves, kidneys, and eyes. You may be able to control your blood sugar by losing weight, eating a healthy diet, and getting daily exercise. You may also have to take insulin or other diabetes medicine. Follow-up care is a key part of your treatment and safety. Be sure to make and go to all appointments. Call your doctor if you are having problems. It's also a good idea to know your test results and keep a list of the medicines you take. How can you care for yourself at home? · Keep your blood sugar at a target level (which you set with your doctor).   ? Eat a good diet that spreads carbohydrate throughout the day. Carbohydrate--the body's main source of fuel--affects blood sugar more than any other nutrient. Carbohydrate is in fruits, vegetables, milk, and yogurt. It also is in breads, cereals, vegetables such as potatoes and corn, and sugary foods such as candy and cakes. ? Aim for 30 minutes of exercise on most, preferably all, days of the week. Walking is a good choice. You also may want to do other activities, such as running, swimming, cycling, or playing tennis or team sports. If your doctor says it's okay, do muscle-strengthening exercises at least 2 times a week. ? Take your medicines exactly as prescribed. Call your doctor if you think you are having a problem with your medicine. You will get more details on the specific medicines your doctor prescribes. · Check your blood sugar as often as your doctor recommends. It is important to keep track of any symptoms you have, such as low blood sugar. Also tell your doctor if you have any changes in your activities, diet, or insulin use. · Talk to your doctor before you start taking aspirin every day. Aspirin can help certain people lower their risk of a heart attack or stroke. But taking aspirin isn't right for everyone, because it can cause serious bleeding. · Do not smoke. If you need help quitting, talk to your doctor about stop-smoking programs and medicines. These can increase your chances of quitting for good. · Keep your cholesterol and blood pressure at normal levels. You may need to take one or more medicines to reach your goals. Take them exactly as directed. Do not stop or change a medicine without talking to your doctor first.  When should you call for help? Call 911 anytime you think you may need emergency care. For example, call if:    · You passed out (lost consciousness), or you suddenly become very sleepy or confused.  (You may have very low blood sugar.)    Call your doctor now or seek immediate medical care if:    · Your blood sugar is 300 mg/dL or is higher than the level your doctor has set for you.     · You have symptoms of low blood sugar, such as:  ? Sweating. ? Feeling nervous, shaky, and weak. ? Extreme hunger and slight nausea. ? Dizziness and headache.  ? Blurred vision. ? Confusion.    Watch closely for changes in your health, and be sure to contact your doctor if:    · You often have problems controlling your blood sugar.     · You have symptoms of long-term diabetes problems, such as:  ? New vision changes. ? New pain, numbness, or tingling in your hands or feet. ? Skin problems. Where can you learn more? Go to http://etelvina-olivier.info/. Enter C553 in the search box to learn more about \"Type 2 Diabetes: Care Instructions. \"  Current as of: July 25, 2018  Content Version: 11.9  © 0262-9955 [employer]. Care instructions adapted under license by [employer] (which disclaims liability or warranty for this information). If you have questions about a medical condition or this instruction, always ask your healthcare professional. [place] [address] any warranty or liability for your use of this information. Gastroesophageal Reflux Disease (GERD): Care Instructions  Your Care Instructions    Gastroesophageal reflux disease (GERD) is the backward flow of stomach acid into the esophagus. The esophagus is the tube that leads from your throat to your stomach. A one-way valve prevents the stomach acid from moving up into this tube. When you have GERD, this valve does not close tightly enough. If you have mild GERD symptoms including heartburn, you may be able to control the problem with antacids or over-the-counter medicine. Changing your diet, losing weight, and making other lifestyle changes can also help reduce symptoms. Follow-up care is a key part of your treatment and safety.  Be sure to make and go to all appointments, and call your doctor if you are having problems. It's also a good idea to know your test results and keep a list of the medicines you take. How can you care for yourself at home? · Take your medicines exactly as prescribed. Call your doctor if you think you are having a problem with your medicine. · Your doctor may recommend over-the-counter medicine. For mild or occasional indigestion, antacids, such as Tums, Gaviscon, Mylanta, or Maalox, may help. Your doctor also may recommend over-the-counter acid reducers, such as Pepcid AC, Tagamet HB, Zantac 75, or Prilosec. Read and follow all instructions on the label. If you use these medicines often, talk with your doctor. · Change your eating habits. ? It's best to eat several small meals instead of two or three large meals. ? After you eat, wait 2 to 3 hours before you lie down. ? Chocolate, mint, and alcohol can make GERD worse. ? Spicy foods, foods that have a lot of acid (like tomatoes and oranges), and coffee can make GERD symptoms worse in some people. If your symptoms are worse after you eat a certain food, you may want to stop eating that food to see if your symptoms get better. · Do not smoke or chew tobacco. Smoking can make GERD worse. If you need help quitting, talk to your doctor about stop-smoking programs and medicines. These can increase your chances of quitting for good. · If you have GERD symptoms at night, raise the head of your bed 6 to 8 inches by putting the frame on blocks or placing a foam wedge under the head of your mattress. (Adding extra pillows does not work.)  · Do not wear tight clothing around your middle. · Lose weight if you need to. Losing just 5 to 10 pounds can help. When should you call for help?   Call your doctor now or seek immediate medical care if:    · You have new or different belly pain.     · Your stools are black and tarlike or have streaks of blood.    Watch closely for changes in your health, and be sure to contact your doctor if:    · Your symptoms have not improved after 2 days.     · Food seems to catch in your throat or chest.   Where can you learn more? Go to http://etelvina-olivier.info/. Enter C256 in the search box to learn more about \"Gastroesophageal Reflux Disease (GERD): Care Instructions. \"  Current as of: March 27, 2018  Content Version: 11.9  © 3275-2964 [employer]. Care instructions adapted under license by [employer] (which disclaims liability or warranty for this information). If you have questions about a medical condition or this instruction, always ask your healthcare professional. [place] [address] any warranty or liability for your use of this information. Learning About Diuretics for High Blood Pressure  Introduction  Diuretics help to lower blood pressure. This reduces your risk of a heart attack and stroke. It also reduces your risk of kidney disease. Diuretics cause your kidneys to remove sodium and water. They also relax the blood vessel walls. These help lower your blood pressure. Examples  · Chlorthalidone  · Hydrochlorothiazide  Possible side effects  There are some common side effects. They are:  · Too little potassium. · Feeling dizzy. · Rash. · Urinating a lot. · High blood sugar. (But this is not common.)  You may have other side effects. Check the information that comes with your medicine. What to know about taking this medicine  · You may take other medicines for blood pressure. Diuretics can help those work better. They can also prevent extra fluid in your body. · You may need to take potassium pills. Or you may have to watch how much potassium is in your food. Ask your doctor about this. · You may need blood tests to check your kidneys and your potassium level. · Take your medicines exactly as prescribed. Call your doctor if you think you are having a problem with your medicine.   · Check with your doctor or pharmacist before you use any other medicines. This includes over-the-counter medicines. Make sure your doctor knows all of the medicines, vitamins, herbal products, and supplements you take. Taking some medicines together can cause problems. Where can you learn more? Go to http://etelvina-olivier.info/. Enter S928 in the search box to learn more about \"Learning About Diuretics for High Blood Pressure. \"  Current as of: July 22, 2018  Content Version: 11.9  © 2276-0405 [employer]. Care instructions adapted under license by [employer] (which disclaims liability or warranty for this information). If you have questions about a medical condition or this instruction, always ask your healthcare professional. [address] any warranty or liability for your use of this information. Learning About Heart Failure  What is heart failure? Heart failure means that your heart muscle does not pump as much blood as your body needs. Failure does not mean that your heart has stopped. It means that your heart is not pumping as well as it should. Your body has an amazing ability to make up for heart failure. It may do such a good job that you don't know you have a disease. But at some point, your heart and body will no longer be able to keep up. Then fluid starts to build up in your lungs and other parts of your body. What can you expect when you have heart failure? Heart failure is a lifelong (chronic) disease. Treatment may be able to slow the disease and help you feel better. But heart failure tends to get worse over time. Despite this, there are many steps you can take to feel better and stay healthy longer. Early on, your symptoms may not be too bad. As heart failure gets worse, symptoms typically get worse, and you may need to limit your activities. Heart failure can also get worse suddenly. If this happens, you need emergency care.  Then, after treatment, your symptoms may go back to being stable (which means they stay the same) for a long time. Heart failure can lead to other health problems, such as heart rhythm problems. Over time, your treatment options may change, especially as your symptoms get worse. As heart failure gets worse, palliative care can help improve the quality of your life. You can do advance care planning to decide what kind of care you want at the end of your life. What are the symptoms? Symptoms of heart failure start to happen when your heart can't pump enough blood to the rest of your body. In the early stages of heart failure, you may:  · Feel tired easily. · Be short of breath when you exert yourself. · Feel like your heart is pounding or racing (palpitations). · Feel weak or dizzy. As heart failure gets worse, fluid starts to build up in your lungs and other parts of your body. This may cause you to:  · Feel short of breath even at rest.  · Have swelling (edema), especially in your legs, ankles, and feet. · Gain weight. This may happen over just a day or two, or more slowly. · Cough or wheeze, especially when you lie down. How is heart failure treated? · You'll probably take several medicines. · You might attend cardiac rehabilitation (rehab) to get education and support that help you make lifestyle changes and stay as healthy as possible. · You may get a heart device. A pacemaker helps your heart pump blood. An ICD can stop abnormal heart rhythms. How can you care for yourself? There are many steps you can take to feel better and stay healthy longer. These steps are an important part of treatment. They can help you stay active and enjoy life. · Take your medicine the right way. Avoid medicines that can make your symptoms worse. · Check your weight and symptoms every day. Know what to do if your symptoms get worse. · Limit sodium. This helps keep fluid from building up. It may help you feel better. · Be active.  Exercise regularly, but don't exercise too hard. · Be heart-healthy. Eat healthy foods, stay at a healthy weight, limit alcohol, and don't smoke. · Stay as healthy as possible. Avoid colds and flu, get help for depression and anxiety, and manage stress. Follow-up care is a key part of your treatment and safety. Be sure to make and go to all appointments, and call your doctor if you are having problems. It's also a good idea to know your test results and keep a list of the medicines you take. Where can you learn more? Go to http://etelvina-olivier.info/. Enter D970 in the search box to learn more about \"Learning About Heart Failure. \"  Current as of: July 22, 2018  Content Version: 11.9  © 3553-4001 [employer]. Care instructions adapted under license by [employer] (which disclaims liability or warranty for this information). If you have questions about a medical condition or this instruction, always ask your healthcare professional. [place] [address] any warranty or liability for your use of this information. Learning About Nebulizers  What is a nebulizer? A nebulizer is a tool that delivers liquid medicine as a fine mist. You breathe in the medicine through a mouthpiece or face mask. This sends the medicine directly to your airways and lungs. You breathe in the medicine for a few minutes. What is it used for? A nebulizer may be used to treat respiratory problems. These include asthma and chronic obstructive pulmonary disease (COPD). If you have asthma, it can be used with daily controller medicines or with quick-relief medicine during an attack or flare-up. A nebulizer can make inhaling medicines easier. It may be very helpful if it is hard for you to breathe or use an inhaler. How do you use a nebulizer? 6. Put the medicine into the medicine container. Be sure to measure the right amount.   7. Make sure that the container is connected to the mouthpiece or face mask.  8. Turn on the air compressor. 9. Take deep, slow breaths through the mouthpiece or mask. Hold each breath for about 2 seconds. 10. Continue breathing until the medicine is gone from the container. When the medicine is gone, there will be no more mist coming out. This may take about 10 minutes. 11. Shake the container to make sure you get all the medicine. Where can you learn more? Go to http://etelvina-olivier.info/. Enter E342 in the search box to learn more about \"Learning About Nebulizers. \"  Current as of: September 5, 2018  Content Version: 11.9  © 3029-5060 [employer]. Care instructions adapted under license by [employer] (which disclaims liability or warranty for this information). If you have questions about a medical condition or this instruction, always ask your healthcare professional. [place] [address] any warranty or liability for your use of this information. Learning About Statins for People With Diabetes  Introduction  Statins are medicines that help with your cholesterol. Cholesterol is a type of fat in your blood. If you have too much of this fat, it can build up in blood vessels. This raises your risk of heart disease, heart attack, and stroke. Many people with diabetes take statins. Diabetes can cause problems in your body that may also lead to heart disease. That means your risks of heart attack and stroke are higher when you have diabetes. Statins can lower your risk. Your doctor may prescribe a statin if:  · You have diabetes. · AND you are age 36 to 76. Statins may help people with diabetes at other ages too. Your doctor can help you decide if statins may help you. Examples  Common statins include:  · Atorvastatin (Lipitor). · Pravastatin (Pravachol). · Rosuvastatin (Crestor). · Simvastatin (Zocor). Possible side effects  Some people who take statins report that they have more muscle aches.  But it's not clear whether these are actually a side effect of statins. Most side effects will go away if you stop taking the medicine. You may have other side effects not described here. Check the information that comes with your medicine. What to know about taking this medicine  · You must take statins on a regular basis for them to work well. If you stop, your risk for heart attack and stroke may go back up. · Be safe with medicines. Take your medicines exactly as prescribed. Call your doctor if you think you are having a problem with your medicine. · If you have side effects that bother you, talk to your doctor. You may be able to take a different statin. · Check with your doctor or pharmacist before you use any other medicines. This includes over-the-counter medicines. Make sure your doctor knows all of the medicines, vitamins, herbal products, and supplements you take. Taking some medicines together can cause problems. Where can you learn more? Go to http://etelvina-olivier.info/. Enter G896 in the search box to learn more about \"Learning About Statins for People With Diabetes. \"  Current as of: July 25, 2018  Content Version: 11.9  © 7739-5068 [employer]. Care instructions adapted under license by [email or web address] (which disclaims liability or warranty for this information). If you have questions about a medical condition or this instruction, always ask your healthcare professional. [address] any warranty or liability for your use of this information. Patient Education   Please follow up as an outpatient with [address] and your pulmonary specialist. Return to the ER for difficulty breathing, chest pain, or any other concerns.      Chronic Obstructive Pulmonary Disease (COPD): Care Instructions  Your Care Instructions    Chronic obstructive pulmonary disease (COPD) is a general term for a group of lung diseases, including emphysema and chronic bronchitis. People with COPD have decreased airflow in and out of the lungs, which makes it hard to breathe. The airways also can get clogged with thick mucus. Cigarette smoking is a major cause of COPD. Although there is no cure for COPD, you can slow its progress. Following your treatment plan and taking care of yourself can help you feel better and live longer. Follow-up care is a key part of your treatment and safety. Be sure to make and go to all appointments, and call your doctor if you are having problems. It's also a good idea to know your test results and keep a list of the medicines you take. How can you care for yourself at home?   Staying healthy    · Do not smoke. This is the most important step you can take to prevent more damage to your lungs. If you need help quitting, talk to your doctor about stop-smoking programs and medicines. These can increase your chances of quitting for good.     · Avoid colds and flu. Get a pneumococcal vaccine shot. If you have had one before, ask your doctor whether you need a second dose. Get the flu vaccine every fall. If you must be around people with colds or the flu, wash your hands often.     · Avoid secondhand smoke, air pollution, and high altitudes. Also avoid cold, dry air and hot, humid air. Stay at home with your windows closed when air pollution is bad.    Medicines and oxygen therapy    · Take your medicines exactly as prescribed. Call your doctor if you think you are having a problem with your medicine.     · You may be taking medicines such as:  ? Bronchodilators. These help open your airways and make breathing easier. Bronchodilators are either short-acting (work for 6 to 9 hours) or long-acting (work for 24 hours). You inhale most bronchodilators, so they start to act quickly. Always carry your quick-relief inhaler with you in case you need it while you are away from home. ? Corticosteroids (prednisone, budesonide).  These reduce airway inflammation. They come in pill or inhaled form. You must take these medicines every day for them to work well.     · A spacer may help you get more inhaled medicine to your lungs. Ask your doctor or pharmacist if a spacer is right for you. If it is, ask how to use it properly.     · Do not take any vitamins, over-the-counter medicine, or herbal products without talking to your doctor first.     · If your doctor prescribed antibiotics, take them as directed. Do not stop taking them just because you feel better. You need to take the full course of antibiotics.     · Oxygen therapy boosts the amount of oxygen in your blood and helps you breathe easier. Use the flow rate your doctor has recommended, and do not change it without talking to your doctor first.   Activity    · Get regular exercise. Walking is an easy way to get exercise. Start out slowly, and walk a little more each day.     · Pay attention to your breathing. You are exercising too hard if you cannot talk while you are exercising.     · Take short rest breaks when doing household chores and other activities.     · Learn breathing methods--such as breathing through pursed lips--to help you become less short of breath.     · If your doctor has not set you up with a pulmonary rehabilitation program, talk to him or her about whether rehab is right for you. Rehab includes exercise programs, education about your disease and how to manage it, help with diet and other changes, and emotional support. Diet    · Eat regular, healthy meals. Use bronchodilators about 1 hour before you eat to make it easier to eat. Eat several small meals instead of three large ones.  Drink beverages at the end of the meal. Avoid foods that are hard to chew.     · Eat foods that contain protein so that you do not lose muscle mass.     · Talk with your doctor if you gain too much weight or if you lose weight without trying.    Mental health    · Talk to your family, friends, or a therapist about your feelings. It is normal to feel frightened, angry, hopeless, helpless, and even guilty. Talking openly about bad feelings can help you cope. If these feelings last, talk to your doctor. When should you call for help? Call 911 anytime you think you may need emergency care. For example, call if:    · You have severe trouble breathing.    Call your doctor now or seek immediate medical care if:    · You have new or worse trouble breathing.     · You cough up blood.     · You have a fever.    Watch closely for changes in your health, and be sure to contact your doctor if:    · You cough more deeply or more often, especially if you notice more mucus or a change in the color of your mucus.     · You have new or worse swelling in your legs or belly.     · You are not getting better as expected. Where can you learn more? Go to http://etelvina-olivier.info/. Maybimal April in the search box to learn more about \"Chronic Obstructive Pulmonary Disease (COPD): Care Instructions. \"  Current as of: September 5, 2018  Content Version: 11.9  © 9250-6322 [employer]. Care instructions adapted under license by [employer] (which disclaims liability or warranty for this information). If you have questions about a medical condition or this instruction, always ask your healthcare professional. [address] any warranty or liability for your use of this information.

## 2019-05-31 NOTE — HOME CARE
Rec HC order - spoke with pt - she states she is not homebound, but will accept [facility] visit - matt DENNEY and rec [facility] [place], [facility]. order for [facility] for COPD - ordered RW from [facility] and it has been delivered to pt room - D Emaashli VALDEZ

## 2019-06-01 ENCOUNTER — HOME CARE VISIT (OUTPATIENT)
Dept: HOME HEALTH SERVICES | Facility: HOME HEALTH | Age: 59
End: 2019-06-01

## 2019-06-03 ENCOUNTER — HOME CARE VISIT (OUTPATIENT)
Dept: SCHEDULING | Facility: HOME HEALTH | Age: 59
End: 2019-06-03

## 2019-06-03 ENCOUNTER — HOME CARE VISIT (OUTPATIENT)
Dept: HOME HEALTH SERVICES | Facility: HOME HEALTH | Age: 59
End: 2019-06-03

## 2019-06-06 ENCOUNTER — OFFICE VISIT (OUTPATIENT)
Dept: PULMONOLOGY | Age: 59
End: 2019-06-06

## 2019-06-06 ENCOUNTER — TELEPHONE (OUTPATIENT)
Dept: PULMONOLOGY | Age: 59
End: 2019-06-06

## 2019-06-06 VITALS
SYSTOLIC BLOOD PRESSURE: 150 MMHG | HEIGHT: 63 IN | HEART RATE: 84 BPM | OXYGEN SATURATION: 98 % | BODY MASS INDEX: 44.3 KG/M2 | WEIGHT: 250 LBS | DIASTOLIC BLOOD PRESSURE: 78 MMHG | RESPIRATION RATE: 18 BRPM | TEMPERATURE: 97.4 F

## 2019-06-06 DIAGNOSIS — E66.01 OBESITY, CLASS III, BMI 40-49.9 (MORBID OBESITY) (HCC): ICD-10-CM

## 2019-06-06 DIAGNOSIS — J96.21 ACUTE ON CHRONIC RESPIRATORY FAILURE WITH HYPOXIA (HCC): ICD-10-CM

## 2019-06-06 DIAGNOSIS — J44.1 CHRONIC OBSTRUCTIVE PULMONARY DISEASE WITH ACUTE EXACERBATION (HCC): Primary | ICD-10-CM

## 2019-06-06 DIAGNOSIS — E11.21 TYPE 2 DIABETES WITH NEPHROPATHY (HCC): ICD-10-CM

## 2019-06-06 DIAGNOSIS — G47.33 OSA ON CPAP: ICD-10-CM

## 2019-06-06 DIAGNOSIS — R94.2 RESTRICTIVE VENTILATORY DEFECT: ICD-10-CM

## 2019-06-06 DIAGNOSIS — J96.11 CHRONIC RESPIRATORY FAILURE WITH HYPOXIA (HCC): ICD-10-CM

## 2019-06-06 DIAGNOSIS — I10 ESSENTIAL HYPERTENSION, BENIGN: ICD-10-CM

## 2019-06-06 DIAGNOSIS — J44.1 COPD EXACERBATION (HCC): ICD-10-CM

## 2019-06-06 DIAGNOSIS — Z99.89 OSA ON CPAP: ICD-10-CM

## 2019-06-06 RX ORDER — ALBUTEROL SULFATE 2.5 MG/.5ML
2.5 SOLUTION RESPIRATORY (INHALATION)
Qty: 60 ML | Refills: 3 | Status: ON HOLD | OUTPATIENT
Start: 2019-06-06 | End: 2019-07-02 | Stop reason: SDUPTHER

## 2019-06-06 NOTE — LETTER
6/6/19 Patient: Melvin Winn YOB: 1960 Date of Visit: 6/6/2019  
 
HAYDEN Nguyen MD 
[address] [address] [facility] [address] VIA Facsimile: [phone] Dear Ashlee Crowell MD, Thank you for referring Ms. Abdon Aldridge to [address] [place] [address] for evaluation. My notes for this consultation are attached. If you have questions, please do not hesitate to call me. I look forward to following your patient along with you.  
 
 
Sincerely, 
 
Harvey Solorio, DO

## 2019-06-06 NOTE — PROGRESS NOTES
[facility]  Pulmonary, Critical Care, and Sleep Medicine    Office Progress Note      Primary Care Physician: Claribel Harper MD     Reason for Visit:  Evaluation for SHERRIE and CPAP therapy      Assessment:  1. COPD on LTOT: 2LPM- Followd by Dr. Franklin Bhagat. Patient continue with frequent COPD exacerbations requiring hospitalization and NIV. She has been hospitalized 3 times since January 2019. She is completing steroid taper and is steroid dependent. She reports compliance with inhalers: Spiriva Respirmat and Advair. She reports that after several attempts she just got authorization for Alline Lights which she will start taking once she gets it. She is having difficulty with expectorating sputum. She has not tried hypertonic saline before but she is willing to try. 2. Obstructive Sleep Apnea (SHERRIE)- Currently on CPAP. She has been using her device but she has been having difficulties with obtaining consumable supplies. However she is reporting increased difficulty exhaling on the device. Given her multiple exacerbations I have discussed NIV today with her and she would be more than willing to try NIV. She is quite familiar with it as she has used it multiple times in the hospital.  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 Poor Sleep Hygiene- multifactorial  5. Diabetes  6. Hypertension  7. Morbid Obesity: Body mass index is 44.29 kg/m².     Plan:  · Will order NIV for patient in place of CPAP along with supplemental oxygen at 2LPM  · COPD Nurse navigator  · Will give patient a trial of 3.5% hypertonic saline nebs to see if this helps with mobilizing sputum  · Continue with current inhalers  · Start Daliresp when able  · Follow up with Dr. Franklin Bhagat for COPD  · Patient to call our office if symptoms if condition declines  · Continue to work on cutting back on caffeine intake  · Healthy lifestyle changes to include weight loss and exercise discussed. · Healthy sleep habits were reviewed and encouraged. ·  and workplace safety reviewed and discussed as appropriate. Drowsy and/or inattentive driving should be avoided. · Follow-up in 3 months, sooner should new symptoms or problems arise. History of Present Illness: Mrs. Jose Multani is a 60 y.o. female patient who presents for evaluation of SHERRIE and CPAP therapy. The history was provided by the patient. The patient is followed in our clinic for COPD by Dr. Roberto Holbrook. She had previously been followed for her SHERRIE and CPAP therapy by Dr. Dyan Lincoln at [facility]. She switch her SHERRIE care to our clinic in May 2018. Since her last visit, she was hospitalized in January, April and again in late May of 2019 for COPD exacerbation. She is continuing with her Advair and Spiriva. She is needing her PRN albuterol approximately 4 times daily. She is having difficulty expectorating sputum. No chest pain. No hemoptysis. Her insurance just approved her Daliresp. She is using her CPAP but reports that it is harder to breakout again the air. She reports she has not received new supplies for her CPPA     She has cut back on her coffee intake since last visit    She is using supplemental oxygen at 2LPM at night and with walking    Occupation:    Not working                       Driving: Yes  Drowsy Driving: is not reported.       Motor vehicle accident(s) associated with drowsy driving:is denied by the patient         3 most recent PHQ Screens 6/6/2019   Little interest or pleasure in doing things Not at all   Feeling down, depressed, irritable, or hopeless Not at all   Total Score PHQ 2 0       Preemption Scale 6/6/2019 5/30/2018   Sitting and Reading 1 0   Watching TV 3 1   Sitting, inactive in a public place (e.g. a movie theater or meeting) 0 0   As a passenger in a car for an hour, without a break 1 0   Lying down to rest in the afternoon, when circumstances permit 3 1   Sitting and talking to someone 0 0   Sitting quietly after lunch without alcohol 1 0   In a car, while stopped for a few minutes in traffic 0 0   Orange Sleepiness Score 9 2     Positive Airway Pressure (PAP) Compliance  Report & Summary Trends  DME: CPAP: MED, Oxygen : [facility]    Date >4 hr use % Time  (Total Time%) AHI PAP Rx Median Use Time Leak-Median  (95%) Notes   3/2/2018-05/30/18 43 (78) 3.3 CPAP 9 cwp EPR:2 04:14:00 7.6 (35.8) O2 @ 2LPM   3/5/19-6/2/19 46 (74) 2.4 CPAP 9 cwp EPR:2 04:44:00 9.7 (52.3) O2 @ 2LPM                           Past Medical History:  Past Medical History:   Diagnosis Date    Asthma     Chronic lung disease     COPD     Cystocele, midline     Diabetes mellitus ([facility] [address]     GERD (gastroesophageal reflux disease)     Hidradenitis suppurativa     Hyperlipidemia     Hypertension     SHERRIE on CPAP     CPAP    Stress incontinence        Past Surgical History:  Past Surgical History:   Procedure Laterality Date    BREAST SURGERY PROCEDURE UNLISTED      Right breast benign tumor removal    HX APPENDECTOMY      HX CHOLECYSTECTOMY      HX DILATION AND CURETTAGE      Dysfunctional uterine bleeding, thought 2/2 fibroids    HX TUBAL LIGATION         Family History:  Family History   Problem Relation Age of Onset    Hypertension Mother     Stroke Mother     Breast Cancer Mother         Bilateral mastectomies    Cancer Mother         ovarian and breast    Heart Failure Mother     Heart Attack Father         2011    Heart Surgery Father         CABG    Heart Failure Father     COPD Sister         Heavy smoker    Cancer Sister         ovarian    Heart Failure Sister     Lung Disease Sister     Asthma Child     Cancer Maternal Aunt         pancreatic    Cancer Maternal Grandfather         stomach       Social History:  Social History     Tobacco Use    Smoking status: Former Smoker     Packs/day: 1.00     Years: 30.00     Pack years: 30.00     Types: Cigarettes     Start date: 1966     Last attempt to quit: 2006     Years since quittin.7    Smokeless tobacco: Never Used    Tobacco comment: 1-1.5 packs per day   Substance Use Topics    Alcohol use: No    Drug use: No        Caffeine Amount Time of last Intake Comments   Coffee 0-1 c/am     Soda 2L/1.5 days  Diet Coke or Ginger Ale   Tea Rare     Energy Drinks None     Over- the - counter stimulant pills None     Other Substances      Alcohol None     Tobacco None     Drugs None         Medications:  Current Outpatient Medications on File Prior to Visit   Medication Sig Dispense Refill    predniSONE (DELTASONE) 10 mg tablet 1 tablet with breakfast every other morning 30 Tab 1    fluticasone propionate (FLONASE ALLERGY RELIEF) 50 mcg/actuation nasal spray 2 Sprays by Both Nostrils route daily.  fluticasone propion-salmeterol (ADVAIR HFA) 230-21 mcg/actuation inhaler Take 2 Puffs by inhalation two (2) times a day.  hydroCHLOROthiazide (HYDRODIURIL) 12.5 mg tablet Take 12.5 mg by mouth daily.  tiotropium bromide (SPIRIVA RESPIMAT) 2.5 mcg/actuation inhaler Take 2 Puffs by inhalation daily.  insulin glargine (LANTUS,BASAGLAR) 100 unit/mL (3 mL) inpn 35 Units by SubCUTAneous route nightly. (Patient taking differently: 30 Units by SubCUTAneous route nightly.) 28 Units 0    albuterol sulfate 90 mcg/actuation aepb Take 1 Puff by inhalation every four (4) hours. 1 Inhaler 0    NOVOLOG FLEXPEN U-100 INSULIN 100 unit/mL inpn Continue home Sliding scale insulin as prior to admission (Patient taking differently: 1 Units by SubCUTAneous route three (3) times daily. If BG <100=0u  101-150=5u  151-250=8u  251-300=12u  >300 call MD  ) 1 Pen 0    albuterol (PROVENTIL VENTOLIN) 2.5 mg /3 mL (0.083 %) nebulizer solution 3 mL by Nebulization route every four (4) hours as needed for Wheezing.  Indications: BRONCHOSPASM PREVENTION, Chronic Obstructive Pulmonary Disease 30 Each 0    omeprazole (PRILOSEC) 40 mg capsule Take 40 mg by mouth daily. Indications: gastroesophageal reflux disease      lisinopril (PRINIVIL, ZESTRIL) 40 mg tablet Take 20 mg by mouth two (2) times a day. Indications: high blood pressure      simethicone (MYLICON) 80 mg chewable tablet Take 80 mg by mouth every six (6) hours as needed for Flatulence.  cpap machine kit by Does Not Apply route nightly. M.E.D.      OXYGEN-AIR DELIVERY SYSTEMS 2 L by Nasal route continuous. [facility]      aspirin delayed-release 81 mg tablet Take 81 mg by mouth daily.  famotidine (PEPCID) 20 mg tablet Take 20 mg by mouth two (2) times daily as needed for Other (reflux).  montelukast (SINGULAIR) 10 mg tablet Take 10 mg by mouth nightly.  predniSONE (DELTASONE) 10 mg tablet Prednisone 10mg tabs: p.o.  2 tabs daily for 3 days then drop to   1 tabs daily for 2 days then drop to   Your regularly scheduled dose of 1 tab every other day. Dispense 10 tabs  Dispense 30 tabs  Indications: COPD exacerbation 10 Tab 0    roflumilast (DALIRESP) 250 mcg tab Take 1 Tab by mouth daily. for four weeks then call MD for further direction. 28 Tab 0    mometasone/formoterol (DULERA IN) Take 1 Puff by inhalation daily. Indications: Bronchospasm Prevention with COPD, per pt      albuterol-ipratropium (DUO-NEB) 2.5 mg-0.5 mg/3 ml nebu 3 mL by Nebulization route every six (6) hours as needed. (Patient taking differently: 3 mL by Nebulization route every six (6) hours as needed for Cough. as needed for cough, dyspnea) 30 Nebule 0     No current facility-administered medications on file prior to visit. Allergy:  Allergies   Allergen Reactions    Ancef [Cefazolin] Hives    Contrast Agent [Iodine] Anaphylaxis, Shortness of Breath and Swelling     Needs pre-medication for IV contrast with Benadryl, Solu-Medrol    Fish Containing Products Anaphylaxis     Pt states she had a severe allergic reaction at 9 y/o.  Metformin Other (comments)     Abdominal pain, diarrhea.  Codeine Other (comments)     Altered mental status       Review of Systems  General ROS: positive for  - fatigue and sleep disturbance  negative for - chills, fever, hot flashes, malaise, night sweats, weight gain or weight loss  ENT ROS: negative  Hematological and Lymphatic ROS: negative for - bleeding problems, blood clots, bruising, jaundice, pallor or swollen lymph nodes  Endocrine ROS: negative for - polydipsia/polyuria, skin changes, temperature intolerance or unexpected weight changes  Respiratory ROS: positive for - shortness of breath, tachypnea and wheezing  negative for - cough, hemoptysis, orthopnea, pleuritic pain, sputum changes or stridor  Cardiovascular ROS: positive fordyspnea on exertion  negative for - loss of consciousness, murmur or orthopnea  Gastrointestinal ROS: no abdominal pain, change in bowel habits, or black or bloody stools  Genito-Urinary ROS: no dysuria, trouble voiding, or hematuria  Musculoskeletal ROS: positive for - joint stiffness  Neurological ROS: no TIA or stroke symptoms  Dermatological ROS: negative for - pruritus, rash or skin lesion changes   Psychological ROS: negative   Otherwise negative. Physical Exam:  Blood pressure 150/78, pulse 84, temperature 97.4 °F (36.3 °C), temperature source Oral, resp. rate 18, height 5' 3\" (1.6 m), weight 113.4 kg (250 lb), SpO2 98 %. on 2LPM, Body mass index is 44.29 kg/m². General:No distress, acyanotic, appears stated age, cooperative, pleasant  HEENT: PERRL, EOMI, throat without erythema or exudate, Tongue- normal size with some dental indention on tongue, Mallampati's score 3+, Uvula- midline.   Neck: Supple,  no abnormally enlarged lymph nodes, thyroid is not enlarged, non-tender, No JVD  Chest: Increased A-P diameter  Lungs: moderate air entry, diffuse - bilateral wheezing, no stridor  Heart: Regular rate and rhythm, S1S2 present, without murmur  Abdomen: Obese,  bowel sounds normoactive, abdomen is soft without significant tenderness  Extremity: negative for edema, cyanosis or clubbing  Skin: Skin color, texture, turgor normal. No rashes or lesions    Data Reviewed:  CBC:   Lab Results   Component Value Date/Time    WBC 7.8 05/31/2019 03:25 AM    HGB 11.7 (L) 05/31/2019 03:25 AM    HCT 35.7 05/31/2019 03:25 AM    PLATELET 730 52/45/3838 03:25 AM    MCV 89.0 05/31/2019 03:25 AM       BMP:   Lab Results   Component Value Date/Time    Sodium 138 05/31/2019 03:25 AM    Potassium 3.8 05/31/2019 03:25 AM    Chloride 102 05/31/2019 03:25 AM    CO2 31 05/31/2019 03:25 AM    Anion gap 5 05/31/2019 03:25 AM    Glucose 237 (H) 05/31/2019 03:25 AM    BUN 17 05/31/2019 03:25 AM    Creatinine 0.96 05/31/2019 03:25 AM    BUN/Creatinine ratio 18 05/31/2019 03:25 AM    GFR est AA >60 05/31/2019 03:25 AM    GFR est non-AA 60 (L) 05/31/2019 03:25 AM    Calcium 9.0 05/31/2019 03:25 AM        TSH:  Lab Results   Component Value Date/Time    TSH 2.76 09/18/2016 02:20 PM    TSH 2.94 02/15/2010 11:24 AM     Results for Randee Hodgson (MRN [id number]) as of 6/6/2019 10:56   Ref. Range 5/28/2019 16:49   pH (POC) Latest Ref Range: 7.35 - 7.45   7.404   pCO2 (POC) Latest Ref Range: 35.0 - 45.0 MMHG 38.8   pO2 (POC) Latest Ref Range: 80 - 100 MMHG 84   HCO3 (POC) Latest Ref Range: 22 - 26 MMOL/L 24.3   sO2 (POC) Latest Ref Range: 92 - 97 % 96   Base excess (POC) Latest Units: mmol/L 0   FIO2 (POC) Latest Units: % 21   Specimen type (POC) Latest Units:   ARTERIAL   Site Latest Units:   LEFT RADIAL   Device: Latest Units:   ROOM AIR   Total resp.  rate Latest Units:   19   Allens test (POC) Latest Units:   N/A     Imaging:  [x]I have personally reviewed the patients radiographs section   Results from Hospital Encounter encounter on 05/27/19   XR CHEST PORT    Narrative EXAM: CHEST ONE VIEW  portable 1749 hours    CLINICAL HISTORY/INDICATION: Progressively worsening shortness of breath,  difficulty speaking in full sentences, history of COPD    COMPARISON: Chest x-ray May 9, 2019, January 18, 2019. TECHNIQUE: One view obtained. FINDINGS:     The cardiac and mediastinal silhouette is normal. The lungs are clear. Pulmonary  vascularity is normal. The costophrenic angles are sharply defined. No bony  abnormalities are seen. Impression No acute finding. Results from [place] encounter on 07/06/18   CT SINUSES WO CONT    Narrative EXAM: CT of the Paranasal Sinuses    INDICATION: Chronic rhinosinusitis    TECHNIQUE: CT of the paranasal sinuses obtained. Sagittal and coronal  reformations obtained. All CT scans at this facility are performed using dose optimization technique as  appropriate to a performed exam, to include automated exposure control,  adjustment of the mA and/or kV according to patient size (including appropriate  matching for site specific examination) or use of iterative reconstruction  technique. IV contrast: None. COMPARISON: None. FINDINGS:   Frontal sinuses: An osteoma is noted in the left frontal sinus near the ethmoid  recess. It partially obstructs the frontal ethmoidal recess but is incomplete. The frontal sinuses are otherwise well aerated. Ethmoid air cells: The air cells are well aerated. Maxillary sinuses: They are well aerated. Sphenoid sinus: The sphenoid sinus is well aerated. Orbits: The orbital globes are unremarkable. The extraocular muscles and  retrobulbar fat are unremarkable. The osseous walls and rims are normal.     Nasal bones: Unremarkable. Nasal septum: The nasal septum is deviated to the left. A gretchen bullosa is  noted on the right. Impression IMPRESSION:  1.   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 It is incompletely  occlusive.          Cardiac Echo:     Results for orders placed or performed during the hospital encounter of 08/19/13   2D ECHO COMPLETE ADULT (TTE)     Status: None    Narrative    [facility]  [address], [address]  [phone]    Transthoracic Echocardiogram    Patient: Gerhardt Lu  MRN: [id number]  [address] #: [de-identified]  : 1960  Age: 47 years  Gender: Female  Height: 63 in  Weight: 246.4 lb  BSA: 2.11 m squared  Study date: 19-Aug-2013  BP: 174 / 97 mmHg  Status: Routine  Location: Echo lab  [facility] ACC #: [id number]    Allergies: CODEINE, CEFAZOLIN, IODINE    Technologist:  Elis Lowe CVT, RCS  Referring:  Camp Sinarielle. Dawn Dance, MD  Referring:  Braden Holter, MD  Interpreting Group:  [address] [place] [address]  Interpreting Physician:  Trevon Rossi MD    IMPRESSIONS:  There was no significant valvular heart disease. SUMMARY:  Procedure information: This was a technically difficult study. Left ventricle: Size was normal. Systolic function was normal by EF  (biplane method of disks). Ejection fraction was estimated to be 60 %. No  obvious wall motion abnormalities identified in the views obtained. There  was mild concentric hypertrophy. INDICATIONS: Edema, Shortness of breath. HISTORY: Prior history: Risk factors: hypertension, oral  hypoglycemic-treated diabetes, medication-treated hypercholesterolemia,  morbid obesity, and a history of cigarette use (quitting 7 years ago). Chronic lung disease. PROCEDURE: This was a routine study. The study included complete 2D  imaging, M-mode, complete spectral Doppler, and color Doppler. Systolic  blood pressure was 174 mmHg, at the start of the study. Diastolic blood  pressure was 97 mmHg, at the start of the study. This was a technically  difficult study. LEFT VENTRICLE: Size was normal. Systolic function was normal by EF  (biplane method of disks). Ejection fraction was estimated to be 60 %. No  obvious wall motion abnormalities identified in the views obtained. There  was mild concentric hypertrophy. DOPPLER: Indeterminate diastolic function.     RIGHT VENTRICLE: The size was normal. Systolic function was normal.  DOPPLER: The tricuspid jet envelope definition was inadequate for  estimation of RV systolic pressure. There are no indirect findings  (abnormal RV volume or geometry, altered pulmonary flow velocity profile,  or leftward septal displacement) which would suggest moderate or severe  pulmonary hypertension. LEFT ATRIUM: Size was normal. LA volume index was 19 ml/m squared. RIGHT ATRIUM: Size was normal.    MITRAL VALVE: Normal valve structure. There was normal leaflet separation. DOPPLER: The transmitral velocity was within the normal range. There was  no evidence for stenosis. There was trivial regurgitation. AORTIC VALVE: The valve was probably trileaflet. Leaflets exhibited normal  cuspal separation and good mobility. DOPPLER: There was no stenosis. There  was no regurgitation. TRICUSPID VALVE: Normal valve structure. There was normal leaflet  separation. DOPPLER: The transtricuspid velocity was within the normal  range. There was no evidence for tricuspid stenosis. There was trivial  regurgitation. PULMONIC VALVE: Not well visualized, but normal Doppler findings. DOPPLER:  There was no stenosis. AORTA: The root exhibited normal size. SYSTEMIC VEINS: IVC: The inferior vena cava was not well visualized. PERICARDIUM: There was no pericardial effusion. A pericardial fat pad was  present.     MEASUREMENT TABLES    Doppler measurements  Tricuspid valve   (Reference normals)  Regurg peak moe   202 cm/s   (--)  RV-RA peak gradient   16 mmHg   (--)    SYSTEM MEASUREMENT TABLES    2D  Ao Diam: 2.24 cm  LA Diam: 3.23 cm  %FS: 34.79 %  EF(Teich): 64.51 %  IVSd: 1.44 cm  LVIDd: 4.05 cm  LVIDs: 2.64 cm  LVPWd: 1.29 cm  SV(Teich): 46.61 ml  EF Biplane: 67.59 %  LVEDV MOD BP: 60.02 ml  LVESV MOD BP: 19.45 ml  LVOT Diam: 1.93 cm  RVIDd: 3.02 cm    CW  AV Vmax: 1.39 m/s  AV maxP.76 mmHG  TR Vmax: 2.02 m/s  AV Vmean: 1.08 m/s    PW  LVOT VTI: 22.77 cm  LVOT Vmax: 1.33 m/s  LVOT Vmean: 0.72 m/s  MV A Moe: 0.01 m/s  MV E Moe: 0.55 m/s  MV E/A Ratio: 48.33  TED (VTI): 2.57 cm2    Prepared and E-signed by    Lan Rossi MD  Signed 19-Aug-2013 17:23:16            Historical Sleep Testing Data:  - PSG: EVMS:  8/4/16:  AHI:12, SpO2 divine 69%- Mean 90%, time below 89%: 30min        Ethel Chin DO, FCCP  Pulmonary, Sleep and Critical Care Medicine

## 2019-06-06 NOTE — PROGRESS NOTES
Chris Burroughs presents today for   Chief Complaint   Patient presents with    Sleep Apnea       Is someone accompanying this pt? Yes  Great grandaughter    Is the patient using any DME equipment during OV? Yes    -D[employer]     Depression Screening:  3 most recent PHQ Screens 6/6/2019   Little interest or pleasure in doing things Not at all   Feeling down, depressed, irritable, or hopeless Not at all   Total Score PHQ 2 0       Learning Assessment:  Learning Assessment 4/9/2018   PRIMARY LEARNER Patient   HIGHEST LEVEL OF EDUCATION - PRIMARY LEARNER  SOME COLLEGE   BARRIERS PRIMARY LEARNER -   PRIMARY LANGUAGE ENGLISH   LEARNER PREFERENCE PRIMARY READING   ANSWERED BY patient Sabiha Piedra   RELATIONSHIP SELF       Abuse Screening:  No flowsheet data found. Fall Risk  Fall Risk Assessment, last 12 mths 2/6/2018   Able to walk? Yes   Fall in past 12 months? No         Coordination of Care:  1. Have you been to the ER, urgent care clinic since your last visit? Hospitalized since your last visit? Yes; Name: [facility]  COPD    2. Have you seen or consulted any other health care providers outside of the [address] [place] [address] since your last visit? Include any pap smears or colon screening.  No    .

## 2019-06-12 ENCOUNTER — APPOINTMENT (OUTPATIENT)
Dept: GENERAL RADIOLOGY | Age: 59
End: 2019-06-12
Attending: EMERGENCY MEDICINE
Payer: MEDICARE

## 2019-06-12 ENCOUNTER — HOSPITAL ENCOUNTER (EMERGENCY)
Age: 59
Discharge: HOME | End: 2019-06-13
Attending: EMERGENCY MEDICINE
Payer: MEDICARE

## 2019-06-12 DIAGNOSIS — J44.1 COPD EXACERBATION (HCC): Primary | ICD-10-CM

## 2019-06-12 LAB
BASOPHILS # BLD: 0 K/UL (ref 0–0.1)
BASOPHILS NFR BLD: 0 % (ref 0–2)
DIFFERENTIAL METHOD BLD: ABNORMAL
EOSINOPHIL # BLD: 0.3 K/UL (ref 0–0.4)
EOSINOPHIL NFR BLD: 4 % (ref 0–5)
ERYTHROCYTE [DISTWIDTH] IN BLOOD BY AUTOMATED COUNT: 13.4 % (ref 11.6–14.5)
HCT VFR BLD AUTO: 39 % (ref 35–45)
HGB BLD-MCNC: 13.8 G/DL (ref 12–16)
LYMPHOCYTES # BLD: 2.4 K/UL (ref 0.9–3.6)
LYMPHOCYTES NFR BLD: 37 % (ref 21–52)
MCH RBC QN AUTO: 30.5 PG (ref 24–34)
MCHC RBC AUTO-ENTMCNC: 35.4 G/DL (ref 31–37)
MCV RBC AUTO: 86.3 FL (ref 74–97)
MONOCYTES # BLD: 0.4 K/UL (ref 0.05–1.2)
MONOCYTES NFR BLD: 7 % (ref 3–10)
NEUTS SEG # BLD: 3.4 K/UL (ref 1.8–8)
NEUTS SEG NFR BLD: 52 % (ref 40–73)
PLATELET # BLD AUTO: 239 K/UL (ref 135–420)
PMV BLD AUTO: 8.8 FL (ref 9.2–11.8)
RBC # BLD AUTO: 4.52 M/UL (ref 4.2–5.3)
WBC # BLD AUTO: 6.5 K/UL (ref 4.6–13.2)

## 2019-06-12 PROCEDURE — 77030029684 HC NEB SM VOL KT MONA -A

## 2019-06-12 PROCEDURE — 99285 EMERGENCY DEPT VISIT HI MDM: CPT

## 2019-06-12 PROCEDURE — 74011000250 HC RX REV CODE- 250: Performed by: EMERGENCY MEDICINE

## 2019-06-12 PROCEDURE — 74011250636 HC RX REV CODE- 250/636: Performed by: EMERGENCY MEDICINE

## 2019-06-12 PROCEDURE — 94640 AIRWAY INHALATION TREATMENT: CPT

## 2019-06-12 PROCEDURE — 85025 COMPLETE CBC W/AUTO DIFF WBC: CPT

## 2019-06-12 PROCEDURE — 84484 ASSAY OF TROPONIN QUANT: CPT

## 2019-06-12 PROCEDURE — 96374 THER/PROPH/DIAG INJ IV PUSH: CPT

## 2019-06-12 PROCEDURE — 71045 X-RAY EXAM CHEST 1 VIEW: CPT

## 2019-06-12 PROCEDURE — 80053 COMPREHEN METABOLIC PANEL: CPT

## 2019-06-12 PROCEDURE — 93005 ELECTROCARDIOGRAM TRACING: CPT

## 2019-06-12 RX ORDER — IPRATROPIUM BROMIDE AND ALBUTEROL SULFATE 2.5; .5 MG/3ML; MG/3ML
3 SOLUTION RESPIRATORY (INHALATION)
Status: COMPLETED | OUTPATIENT
Start: 2019-06-12 | End: 2019-06-12

## 2019-06-12 RX ORDER — IPRATROPIUM BROMIDE AND ALBUTEROL SULFATE 2.5; .5 MG/3ML; MG/3ML
3 SOLUTION RESPIRATORY (INHALATION)
Status: COMPLETED | OUTPATIENT
Start: 2019-06-12 | End: 2019-06-13

## 2019-06-12 RX ADMIN — IPRATROPIUM BROMIDE AND ALBUTEROL SULFATE 3 ML: .5; 3 SOLUTION RESPIRATORY (INHALATION) at 23:48

## 2019-06-12 RX ADMIN — METHYLPREDNISOLONE SODIUM SUCCINATE 125 MG: 125 INJECTION, POWDER, FOR SOLUTION INTRAMUSCULAR; INTRAVENOUS at 23:48

## 2019-06-13 ENCOUNTER — PATIENT OUTREACH (OUTPATIENT)
Dept: PULMONOLOGY | Age: 59
End: 2019-06-13

## 2019-06-13 VITALS
RESPIRATION RATE: 21 BRPM | DIASTOLIC BLOOD PRESSURE: 57 MMHG | HEART RATE: 100 BPM | OXYGEN SATURATION: 92 % | SYSTOLIC BLOOD PRESSURE: 114 MMHG

## 2019-06-13 LAB
ALBUMIN SERPL-MCNC: 3.8 G/DL (ref 3.4–5)
ALBUMIN/GLOB SERPL: 1 {RATIO} (ref 0.8–1.7)
ALP SERPL-CCNC: 87 U/L (ref 45–117)
ALT SERPL-CCNC: 49 U/L (ref 13–56)
ANION GAP SERPL CALC-SCNC: 5 MMOL/L (ref 3–18)
AST SERPL-CCNC: 18 U/L (ref 15–37)
ATRIAL RATE: 89 BPM
BILIRUB SERPL-MCNC: 0.8 MG/DL (ref 0.2–1)
BUN SERPL-MCNC: 10 MG/DL (ref 7–18)
BUN/CREAT SERPL: 11 (ref 12–20)
CALCIUM SERPL-MCNC: 9.4 MG/DL (ref 8.5–10.1)
CALCULATED P AXIS, ECG09: 50 DEGREES
CALCULATED R AXIS, ECG10: 31 DEGREES
CALCULATED T AXIS, ECG11: 50 DEGREES
CHLORIDE SERPL-SCNC: 103 MMOL/L (ref 100–108)
CK MB CFR SERPL CALC: 1.8 % (ref 0–4)
CK MB SERPL-MCNC: 1.5 NG/ML (ref 5–25)
CK SERPL-CCNC: 85 U/L (ref 26–192)
CO2 SERPL-SCNC: 30 MMOL/L (ref 21–32)
CREAT SERPL-MCNC: 0.92 MG/DL (ref 0.6–1.3)
DIAGNOSIS, 93000: NORMAL
GLOBULIN SER CALC-MCNC: 4 G/DL (ref 2–4)
GLUCOSE SERPL-MCNC: 221 MG/DL (ref 74–99)
P-R INTERVAL, ECG05: 142 MS
POTASSIUM SERPL-SCNC: 3.4 MMOL/L (ref 3.5–5.5)
PROT SERPL-MCNC: 7.8 G/DL (ref 6.4–8.2)
Q-T INTERVAL, ECG07: 372 MS
QRS DURATION, ECG06: 88 MS
QTC CALCULATION (BEZET), ECG08: 452 MS
SODIUM SERPL-SCNC: 138 MMOL/L (ref 136–145)
TROPONIN I SERPL-MCNC: <0.02 NG/ML (ref 0–0.04)
VENTRICULAR RATE, ECG03: 89 BPM

## 2019-06-13 PROCEDURE — 74011000250 HC RX REV CODE- 250: Performed by: EMERGENCY MEDICINE

## 2019-06-13 PROCEDURE — 94640 AIRWAY INHALATION TREATMENT: CPT

## 2019-06-13 RX ORDER — IPRATROPIUM BROMIDE AND ALBUTEROL SULFATE 2.5; .5 MG/3ML; MG/3ML
3 SOLUTION RESPIRATORY (INHALATION)
Status: COMPLETED | OUTPATIENT
Start: 2019-06-13 | End: 2019-06-13

## 2019-06-13 RX ORDER — IPRATROPIUM BROMIDE AND ALBUTEROL SULFATE 2.5; .5 MG/3ML; MG/3ML
SOLUTION RESPIRATORY (INHALATION)
Status: DISCONTINUED
Start: 2019-06-13 | End: 2019-06-13 | Stop reason: HOSPADM

## 2019-06-13 RX ORDER — PREDNISONE 10 MG/1
TABLET ORAL
Qty: 48 TAB | Refills: 0 | Status: ON HOLD | OUTPATIENT
Start: 2019-06-13 | End: 2019-06-29

## 2019-06-13 RX ORDER — ALBUTEROL SULFATE 0.83 MG/ML
2.5 SOLUTION RESPIRATORY (INHALATION)
Status: COMPLETED | OUTPATIENT
Start: 2019-06-13 | End: 2019-06-13

## 2019-06-13 RX ADMIN — ALBUTEROL SULFATE 2.5 MG: 2.5 SOLUTION RESPIRATORY (INHALATION) at 03:21

## 2019-06-13 RX ADMIN — IPRATROPIUM BROMIDE AND ALBUTEROL SULFATE 3 ML: .5; 3 SOLUTION RESPIRATORY (INHALATION) at 00:20

## 2019-06-13 RX ADMIN — IPRATROPIUM BROMIDE AND ALBUTEROL SULFATE 3 ML: .5; 3 SOLUTION RESPIRATORY (INHALATION) at 02:03
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
## 2019-06-13 NOTE — DISCHARGE INSTRUCTIONS
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
## 2019-06-24 ENCOUNTER — OFFICE VISIT (OUTPATIENT)
Dept: PULMONOLOGY | Age: 59
End: 2019-06-24

## 2019-06-24 VITALS
HEIGHT: 63 IN | HEART RATE: 88 BPM | SYSTOLIC BLOOD PRESSURE: 180 MMHG | WEIGHT: 249 LBS | BODY MASS INDEX: 44.12 KG/M2 | TEMPERATURE: 97.6 F | DIASTOLIC BLOOD PRESSURE: 80 MMHG | OXYGEN SATURATION: 93 % | RESPIRATION RATE: 20 BRPM

## 2019-06-24 DIAGNOSIS — J96.11 CHRONIC RESPIRATORY FAILURE WITH HYPOXIA (HCC): ICD-10-CM

## 2019-06-24 DIAGNOSIS — G47.33 OSA ON CPAP: ICD-10-CM

## 2019-06-24 DIAGNOSIS — Z99.89 OSA ON CPAP: ICD-10-CM

## 2019-06-24 DIAGNOSIS — J44.1 CHRONIC OBSTRUCTIVE PULMONARY DISEASE WITH ACUTE EXACERBATION (HCC): Primary | ICD-10-CM

## 2019-06-24 NOTE — PROGRESS NOTES
Viv Villarreal presents today for   Chief Complaint   Patient presents with   [facility] Follow Up       Is someone accompanying this pt? No    Is the patient using any DME equipment during OV? Yes     -DME Company [employer]    Depression Screening:  3 most recent PHQ Screens 6/6/2019   Little interest or pleasure in doing things Not at all   Feeling down, depressed, irritable, or hopeless Not at all   Total Score PHQ 2 0       Learning Assessment:  Learning Assessment 4/9/2018   PRIMARY LEARNER Patient   HIGHEST LEVEL OF EDUCATION - PRIMARY LEARNER  SOME COLLEGE   BARRIERS PRIMARY LEARNER -   PRIMARY LANGUAGE ENGLISH   LEARNER PREFERENCE PRIMARY READING   ANSWERED BY patient Lissette Nassar   RELATIONSHIP SELF       Abuse Screening:  No flowsheet data found. Fall Risk  Fall Risk Assessment, last 12 mths 2/6/2018   Able to walk? Yes   Fall in past 12 months? No         Coordination of Care:  1. Have you been to the ER, urgent care clinic since your last visit? Hospitalized since your last visit? Yes; Name: [facility] 6/2019    2. Have you seen or consulted any other health care providers outside of the [address] [place] [address] [place] since your last visit? Include any pap smears or colon screening.  No      PATIENT'S O2 SATS:   93% Room Air Rest, 88 Heart Rate                                          88% Room Air Activity      After walking  220 Feet                                     02 SAT   97% @ 2LPM  HR 80 after Ambulating 200 Feet  Dr Deepika Monet informed

## 2019-06-24 NOTE — PROGRESS NOTES
[facility]  Pulmonary, Critical Care, and Sleep Medicine      Chief complaint:  COPD chronic respiratory failure SHERRIE    HPI:    Maggie Zelaya    is 60years old returns the office today for COPD and chronic respiratory failure and SHERRIE and relates that she has dyspnea on exertion which is significant and that she is coughing up blood-tinged mucus which is light green in color. She denies chest pain leg swelling. She takes Advair Spiriva Daliresp which she just started and prednisone 10 mg every other day as well as supplemental oxygen with activity and sleep with her CPAP. She relates that she is having difficulty with her CPAP and Dr. Flory Martel who is attempting to obtain BiPAP for her      Allergies   Allergen Reactions    Ancef [Cefazolin] Hives    Contrast Agent [Iodine] Anaphylaxis, Shortness of Breath and Swelling     Needs pre-medication for IV contrast with Benadryl, Solu-Medrol    Fish Containing Products Anaphylaxis     Pt states she had a severe allergic reaction at 9 y/o.  Metformin Other (comments)     Abdominal pain, diarrhea.  Codeine Other (comments)     Altered mental status     Current Outpatient Medications   Medication Sig    sodium chloride (HYPER-SAL) 3.5 % nebu Take 1 Ampule by inhalation two (2) times a day.  albuterol sulfate (PROVENTIL;VENTOLIN) 2.5 mg/0.5 mL nebu nebulizer solution 0.5 mL by Nebulization route four (4) times daily as needed for Wheezing. To be used with HyperSal nebulizer solution. ICD code: COPD J44.1    predniSONE (DELTASONE) 10 mg tablet 1 tablet with breakfast every other morning    roflumilast (DALIRESP) 250 mcg tab Take 1 Tab by mouth daily. for four weeks then call MD for further direction.  fluticasone propionate (FLONASE ALLERGY RELIEF) 50 mcg/actuation nasal spray 2 Sprays by Both Nostrils route daily.     fluticasone propion-salmeterol (ADVAIR HFA) 888-92 mcg/actuation inhaler Take 2 Puffs by inhalation two (2) times a day.  hydroCHLOROthiazide (HYDRODIURIL) 12.5 mg tablet Take 12.5 mg by mouth daily.  tiotropium bromide (SPIRIVA RESPIMAT) 2.5 mcg/actuation inhaler Take 2 Puffs by inhalation daily.  insulin glargine (LANTUS,BASAGLAR) 100 unit/mL (3 mL) inpn 35 Units by SubCUTAneous route nightly. (Patient taking differently: 30 Units by SubCUTAneous route nightly.)    albuterol sulfate 90 mcg/actuation aepb Take 1 Puff by inhalation every four (4) hours.  NOVOLOG FLEXPEN U-100 INSULIN 100 unit/mL inpn Continue home Sliding scale insulin as prior to admission (Patient taking differently: 1 Units by SubCUTAneous route three (3) times daily. If BG <100=0u  101-150=5u  151-250=8u  251-300=12u  >300 call MD  )    omeprazole (PRILOSEC) 40 mg capsule Take 40 mg by mouth daily. Indications: gastroesophageal reflux disease    lisinopril (PRINIVIL, ZESTRIL) 40 mg tablet Take 20 mg by mouth two (2) times a day. Indications: high blood pressure    simethicone (MYLICON) 80 mg chewable tablet Take 80 mg by mouth every six (6) hours as needed for Flatulence.  cpap machine kit by Does Not Apply route nightly. M.E.D.    OXYGEN-AIR DELIVERY SYSTEMS 2 L by Nasal route continuous. [facility]    aspirin delayed-release 81 mg tablet Take 81 mg by mouth daily.  famotidine (PEPCID) 20 mg tablet Take 20 mg by mouth two (2) times daily as needed for Other (reflux).  montelukast (SINGULAIR) 10 mg tablet Take 10 mg by mouth nightly.  predniSONE (STERAPRED DS) 10 mg dose pack As directed    predniSONE (DELTASONE) 10 mg tablet Prednisone 10mg tabs: p.o.  2 tabs daily for 3 days then drop to   1 tabs daily for 2 days then drop to   Your regularly scheduled dose of 1 tab every other day. Dispense 10 tabs  Dispense 30 tabs  Indications: COPD exacerbation    mometasone/formoterol (DULERA IN) Take 1 Puff by inhalation daily.  Indications: Bronchospasm Prevention with COPD, per pt    albuterol-ipratropium (Tamraririchardson Cortes) 2.5 mg-0.5 mg/3 ml nebu 3 mL by Nebulization route every six (6) hours as needed. (Patient taking differently: 3 mL by Nebulization route every six (6) hours as needed for Cough. as needed for cough, dyspnea)     No current facility-administered medications for this visit.       Past Medical History:   Diagnosis Date    Asthma     Chronic lung disease     COPD     Cystocele, midline     Diabetes mellitus (HCC)     GERD (gastroesophageal reflux disease)     Hidradenitis suppurativa     Hyperlipidemia     Hypertension     SHERRIE on CPAP     CPAP    Stress incontinence      Past Surgical History:   Procedure Laterality Date    BREAST SURGERY PROCEDURE UNLISTED      Right breast benign tumor removal    HX APPENDECTOMY      HX CHOLECYSTECTOMY      HX DILATION AND CURETTAGE      Dysfunctional uterine bleeding, thought 2/2 fibroids    HX TUBAL LIGATION       Social History     Socioeconomic History    Marital status: LEGALLY      Spouse name: Not on file    Number of children: Not on file    Years of education: Not on file    Highest education level: Not on file   Occupational History    Not on file   Social Needs    Financial resource strain: Not on file    Food insecurity:     Worry: Not on file     Inability: Not on file    Transportation needs:     Medical: Not on file     Non-medical: Not on file   Tobacco Use    Smoking status: Former Smoker     Packs/day: 1.00     Years: 30.00     Pack years: 30.00     Types: Cigarettes     Start date: 1966     Last attempt to quit: 2006     Years since quittin.8    Smokeless tobacco: Never Used    Tobacco comment: 1-1.5 packs per day   Substance and Sexual Activity    Alcohol use: No    Drug use: No    Sexual activity: Never   Lifestyle    Physical activity:     Days per week: Not on file     Minutes per session: Not on file    Stress: Not on file   Relationships    Social connections:     Talks on phone: Not on file     Gets together: Not on file     Attends Hoahaoism service: Not on file     Active member of club or organization: Not on file     Attends meetings of clubs or organizations: Not on file     Relationship status: Not on file    Intimate partner violence:     Fear of current or ex partner: Not on file     Emotionally abused: Not on file     Physically abused: Not on file     Forced sexual activity: Not on file   Other Topics Concern    Not on file   Social History Narrative    Not on file     Family History   Problem Relation Age of Onset    Hypertension Mother     Stroke Mother     Breast Cancer Mother         Bilateral mastectomies    Cancer Mother         ovarian and breast    Heart Failure Mother     Heart Attack Father         2011    Heart Surgery Father         CABG    Heart Failure Father     COPD Sister         Heavy smoker    Cancer Sister         ovarian    Heart Failure Sister     Lung Disease Sister     Asthma Child     Cancer Maternal Aunt         pancreatic    Cancer Maternal Grandfather         stomach       Review of systems:  She denies fever chills poor appetite weight loss    Physical Exam:  Visit Vitals  /80 (BP 1 Location: Left arm, BP Patient Position: Sitting)   Pulse 88   Temp 97.6 °F (36.4 °C) (Oral)   Resp 20   Ht 5' 3\" (1.6 m)   Wt 112.9 kg (249 lb)   SpO2 93% Comment: RA Rest   BMI 44.11 kg/m²       Well-developed well-nourished  HEENT: WNL  Lymph node exam: Supraclavicular cervical lymph nodes negative  Chest: Equal symmetrical expansion no dullness no wheezes rales rubs attenuated breath sounds throughout  Heart: Regular rhythm no gallop no murmur no JVD no peripheral edema  Extremities: No cyanosis clubbing or calf tenderness  Neurological: Alert and oriented    Labs:    O2 sat room air at rest 93% and with walking 150 feet O2 sat desaturated to 88%.   With 2 L of oxygen the patient was able to ambulate several 100 feet without desaturation her O2 sat was 97%    Impression:   Problems with SHERRIE being followed by Dr. Sharron Cameron  COPD with recurrent bacterial bronchitis as suggested by the history of blood-tinged green mucus    Plan:   Continue Daliresp prednisone every other day Spiriva Advair PRN albuterol and supplemental oxygen  Sputum culture and Gram stain  F/u 6 -8 weeks    Claudeen Sequin, MD, [facility]    CC: Staci Macias MD     [address] [facility], [address] [address]     P: [phone]     F: [phone]

## 2019-06-24 NOTE — PATIENT INSTRUCTIONS
Continue Spiriva Respimat 2 inhalations once daily    Continue Advair 2 inhalations twice daily and remember to wash mouth with water and spit it out after inhaling    Continue albuterol solution every 4 hours as needed if you require albuterol too often to control respiratory symptoms call the office for severe symptoms go to the emergency room    Continue Daliresp 1 tablet daily if the side effects related to emotions are too bothersome discontinue and notify the office    Prednisone 10 mg every other day    Submit a good sputum sample to the hospital laboratory and call on Monday for results

## 2019-06-25 ENCOUNTER — HOSPITAL ENCOUNTER (OUTPATIENT)
Dept: LAB | Age: 59
Discharge: HOME | End: 2019-06-25
Payer: MEDICARE

## 2019-06-25 DIAGNOSIS — J96.11 CHRONIC RESPIRATORY FAILURE WITH HYPOXIA (HCC): ICD-10-CM

## 2019-06-25 DIAGNOSIS — J44.1 CHRONIC OBSTRUCTIVE PULMONARY DISEASE WITH ACUTE EXACERBATION (HCC): ICD-10-CM

## 2019-06-25 PROCEDURE — 87070 CULTURE OTHR SPECIMN AEROBIC: CPT

## 2019-06-26 RX ORDER — DOXYCYCLINE 100 MG/1
100 CAPSULE ORAL 2 TIMES DAILY
Qty: 14 CAP | Refills: 0 | Status: SHIPPED | OUTPATIENT
Start: 2019-06-26 | End: 2019-07-02

## 2019-06-27 LAB
BACTERIA SPEC CULT: NORMAL
GRAM STN SPEC: NORMAL
SERVICE CMNT-IMP: NORMAL

## 2019-06-29 ENCOUNTER — HOSPITAL ENCOUNTER (INPATIENT)
Age: 59
LOS: 3 days | Discharge: HOME | DRG: 191 | End: 2019-07-02
Attending: EMERGENCY MEDICINE | Admitting: FAMILY MEDICINE
Payer: MEDICARE

## 2019-06-29 ENCOUNTER — APPOINTMENT (OUTPATIENT)
Dept: GENERAL RADIOLOGY | Age: 59
DRG: 191 | End: 2019-06-29
Attending: EMERGENCY MEDICINE
Payer: MEDICARE

## 2019-06-29 DIAGNOSIS — J44.1 ACUTE EXACERBATION OF CHRONIC OBSTRUCTIVE PULMONARY DISEASE (COPD) (HCC): Primary | ICD-10-CM

## 2019-06-29 LAB
ALBUMIN SERPL-MCNC: 3.7 G/DL (ref 3.4–5)
ALBUMIN/GLOB SERPL: 1 {RATIO} (ref 0.8–1.7)
ALP SERPL-CCNC: 93 U/L (ref 45–117)
ALT SERPL-CCNC: 45 U/L (ref 13–56)
ANION GAP SERPL CALC-SCNC: 6 MMOL/L (ref 3–18)
ARTERIAL PATENCY WRIST A: YES
AST SERPL-CCNC: 24 U/L (ref 15–37)
BASE EXCESS BLD CALC-SCNC: 2 MMOL/L
BASOPHILS # BLD: 0 K/UL (ref 0–0.1)
BASOPHILS NFR BLD: 0 % (ref 0–2)
BDY SITE: ABNORMAL
BILIRUB SERPL-MCNC: 0.8 MG/DL (ref 0.2–1)
BODY TEMPERATURE: 36.7
BUN SERPL-MCNC: 10 MG/DL (ref 7–18)
BUN/CREAT SERPL: 11 (ref 12–20)
CALCIUM SERPL-MCNC: 9.5 MG/DL (ref 8.5–10.1)
CHLORIDE SERPL-SCNC: 106 MMOL/L (ref 100–108)
CO2 SERPL-SCNC: 30 MMOL/L (ref 21–32)
CREAT SERPL-MCNC: 0.9 MG/DL (ref 0.6–1.3)
DIFFERENTIAL METHOD BLD: ABNORMAL
EOSINOPHIL # BLD: 0.3 K/UL (ref 0–0.4)
EOSINOPHIL NFR BLD: 6 % (ref 0–5)
ERYTHROCYTE [DISTWIDTH] IN BLOOD BY AUTOMATED COUNT: 13.6 % (ref 11.6–14.5)
GAS FLOW.O2 O2 DELIVERY SYS: ABNORMAL L/MIN
GAS FLOW.O2 SETTING OXYMISER: 2 L/M
GLOBULIN SER CALC-MCNC: 3.8 G/DL (ref 2–4)
GLUCOSE BLD STRIP.AUTO-MCNC: 358 MG/DL (ref 70–110)
GLUCOSE SERPL-MCNC: 213 MG/DL (ref 74–99)
HCO3 BLD-SCNC: 28.1 MMOL/L (ref 22–26)
HCT VFR BLD AUTO: 38.3 % (ref 35–45)
HGB BLD-MCNC: 13.6 G/DL (ref 12–16)
LYMPHOCYTES # BLD: 2 K/UL (ref 0.9–3.6)
LYMPHOCYTES NFR BLD: 32 % (ref 21–52)
MCH RBC QN AUTO: 30.8 PG (ref 24–34)
MCHC RBC AUTO-ENTMCNC: 35.5 G/DL (ref 31–37)
MCV RBC AUTO: 86.7 FL (ref 74–97)
MONOCYTES # BLD: 0.3 K/UL (ref 0.05–1.2)
MONOCYTES NFR BLD: 5 % (ref 3–10)
NEUTS SEG # BLD: 3.6 K/UL (ref 1.8–8)
NEUTS SEG NFR BLD: 57 % (ref 40–73)
O2/TOTAL GAS SETTING VFR VENT: 28 %
PCO2 BLD: 48.6 MMHG (ref 35–45)
PH BLD: 7.37 [PH] (ref 7.35–7.45)
PLATELET # BLD AUTO: 254 K/UL (ref 135–420)
PMV BLD AUTO: 8.9 FL (ref 9.2–11.8)
PO2 BLD: 81 MMHG (ref 80–100)
POTASSIUM SERPL-SCNC: 4 MMOL/L (ref 3.5–5.5)
PROT SERPL-MCNC: 7.5 G/DL (ref 6.4–8.2)
RBC # BLD AUTO: 4.42 M/UL (ref 4.2–5.3)
SAO2 % BLD: 95 % (ref 92–97)
SERVICE CMNT-IMP: ABNORMAL
SODIUM SERPL-SCNC: 142 MMOL/L (ref 136–145)
SPECIMEN TYPE: ABNORMAL
TOTAL RESP. RATE, ITRR: 23
WBC # BLD AUTO: 6.2 K/UL (ref 4.6–13.2)

## 2019-06-29 PROCEDURE — 5A09357 ASSISTANCE WITH RESPIRATORY VENTILATION, LESS THAN 24 CONSECUTIVE HOURS, CONTINUOUS POSITIVE AIRWAY PRESSURE: ICD-10-PCS | Performed by: FAMILY MEDICINE

## 2019-06-29 PROCEDURE — 74011636637 HC RX REV CODE- 636/637: Performed by: STUDENT IN AN ORGANIZED HEALTH CARE EDUCATION/TRAINING PROGRAM

## 2019-06-29 PROCEDURE — 94760 N-INVAS EAR/PLS OXIMETRY 1: CPT

## 2019-06-29 PROCEDURE — 74011000250 HC RX REV CODE- 250: Performed by: EMERGENCY MEDICINE

## 2019-06-29 PROCEDURE — 74011000250 HC RX REV CODE- 250: Performed by: STUDENT IN AN ORGANIZED HEALTH CARE EDUCATION/TRAINING PROGRAM

## 2019-06-29 PROCEDURE — 94640 AIRWAY INHALATION TREATMENT: CPT

## 2019-06-29 PROCEDURE — 82962 GLUCOSE BLOOD TEST: CPT

## 2019-06-29 PROCEDURE — 94762 N-INVAS EAR/PLS OXIMTRY CONT: CPT

## 2019-06-29 PROCEDURE — 77010033678 HC OXYGEN DAILY

## 2019-06-29 PROCEDURE — 77030029684 HC NEB SM VOL KT MONA -A

## 2019-06-29 PROCEDURE — 65660000004 HC RM CVT STEPDOWN

## 2019-06-29 PROCEDURE — 74011636637 HC RX REV CODE- 636/637: Performed by: EMERGENCY MEDICINE

## 2019-06-29 PROCEDURE — A9270 NON-COVERED ITEM OR SERVICE: HCPCS | Performed by: EMERGENCY MEDICINE

## 2019-06-29 PROCEDURE — 93005 ELECTROCARDIOGRAM TRACING: CPT

## 2019-06-29 PROCEDURE — 82803 BLOOD GASES ANY COMBINATION: CPT

## 2019-06-29 PROCEDURE — 71046 X-RAY EXAM CHEST 2 VIEWS: CPT

## 2019-06-29 PROCEDURE — 36600 WITHDRAWAL OF ARTERIAL BLOOD: CPT

## 2019-06-29 PROCEDURE — 99285 EMERGENCY DEPT VISIT HI MDM: CPT

## 2019-06-29 PROCEDURE — 85025 COMPLETE CBC W/AUTO DIFF WBC: CPT

## 2019-06-29 PROCEDURE — 74011250636 HC RX REV CODE- 250/636: Performed by: EMERGENCY MEDICINE

## 2019-06-29 PROCEDURE — 74011250636 HC RX REV CODE- 250/636: Performed by: STUDENT IN AN ORGANIZED HEALTH CARE EDUCATION/TRAINING PROGRAM

## 2019-06-29 PROCEDURE — 74011250637 HC RX REV CODE- 250/637: Performed by: STUDENT IN AN ORGANIZED HEALTH CARE EDUCATION/TRAINING PROGRAM

## 2019-06-29 PROCEDURE — 80053 COMPREHEN METABOLIC PANEL: CPT

## 2019-06-29 RX ORDER — HYDROCHLOROTHIAZIDE 25 MG/1
12.5 TABLET ORAL DAILY
Status: DISCONTINUED | OUTPATIENT
Start: 2019-06-30 | End: 2019-07-02 | Stop reason: HOSPADM

## 2019-06-29 RX ORDER — FLUTICASONE PROPIONATE 50 MCG
2 SPRAY, SUSPENSION (ML) NASAL DAILY
Status: DISCONTINUED | OUTPATIENT
Start: 2019-06-30 | End: 2019-07-02 | Stop reason: HOSPADM

## 2019-06-29 RX ORDER — ACETAMINOPHEN 325 MG/1
650 TABLET ORAL
Status: DISCONTINUED | OUTPATIENT
Start: 2019-06-29 | End: 2019-07-02 | Stop reason: HOSPADM

## 2019-06-29 RX ORDER — IPRATROPIUM BROMIDE 0.5 MG/2.5ML
0.5 SOLUTION RESPIRATORY (INHALATION)
Status: COMPLETED | OUTPATIENT
Start: 2019-06-29 | End: 2019-06-29

## 2019-06-29 RX ORDER — PANTOPRAZOLE SODIUM 40 MG/1
40 TABLET, DELAYED RELEASE ORAL
Status: DISCONTINUED | OUTPATIENT
Start: 2019-06-30 | End: 2019-07-02 | Stop reason: HOSPADM

## 2019-06-29 RX ORDER — PREDNISONE 20 MG/1
60 TABLET ORAL
Status: DISCONTINUED | OUTPATIENT
Start: 2019-06-30 | End: 2019-07-02 | Stop reason: HOSPADM

## 2019-06-29 RX ORDER — INSULIN GLARGINE 100 [IU]/ML
30 INJECTION, SOLUTION SUBCUTANEOUS
Status: DISCONTINUED | OUTPATIENT
Start: 2019-06-29 | End: 2019-07-02 | Stop reason: HOSPADM

## 2019-06-29 RX ORDER — LISINOPRIL 20 MG/1
20 TABLET ORAL 2 TIMES DAILY
Status: DISCONTINUED | OUTPATIENT
Start: 2019-06-29 | End: 2019-07-02 | Stop reason: HOSPADM

## 2019-06-29 RX ORDER — IPRATROPIUM BROMIDE AND ALBUTEROL SULFATE 2.5; .5 MG/3ML; MG/3ML
3 SOLUTION RESPIRATORY (INHALATION)
Status: DISCONTINUED | OUTPATIENT
Start: 2019-06-29 | End: 2019-07-02

## 2019-06-29 RX ORDER — FAMOTIDINE 20 MG/1
20 TABLET, FILM COATED ORAL
Status: DISCONTINUED | OUTPATIENT
Start: 2019-06-29 | End: 2019-07-02 | Stop reason: HOSPADM

## 2019-06-29 RX ORDER — LEVOFLOXACIN 5 MG/ML
750 INJECTION, SOLUTION INTRAVENOUS
Status: COMPLETED | OUTPATIENT
Start: 2019-06-29 | End: 2019-06-29

## 2019-06-29 RX ORDER — ENOXAPARIN SODIUM 100 MG/ML
40 INJECTION SUBCUTANEOUS EVERY 24 HOURS
Status: DISCONTINUED | OUTPATIENT
Start: 2019-06-29 | End: 2019-07-02 | Stop reason: HOSPADM

## 2019-06-29 RX ORDER — CETIRIZINE HCL 10 MG
10 TABLET ORAL
COMMUNITY
End: 2019-10-14

## 2019-06-29 RX ORDER — ALBUTEROL SULFATE 2.5 MG/.5ML
10 SOLUTION RESPIRATORY (INHALATION)
Status: COMPLETED | OUTPATIENT
Start: 2019-06-29 | End: 2019-06-29

## 2019-06-29 RX ORDER — MAGNESIUM SULFATE 100 %
4 CRYSTALS MISCELLANEOUS AS NEEDED
Status: DISCONTINUED | OUTPATIENT
Start: 2019-06-29 | End: 2019-07-02 | Stop reason: HOSPADM

## 2019-06-29 RX ORDER — ASPIRIN 81 MG/1
81 TABLET ORAL DAILY
Status: DISCONTINUED | OUTPATIENT
Start: 2019-06-30 | End: 2019-07-02 | Stop reason: HOSPADM

## 2019-06-29 RX ORDER — MAGNESIUM SULFATE HEPTAHYDRATE 40 MG/ML
2 INJECTION, SOLUTION INTRAVENOUS
Status: COMPLETED | OUTPATIENT
Start: 2019-06-29 | End: 2019-06-29

## 2019-06-29 RX ORDER — INSULIN LISPRO 100 [IU]/ML
INJECTION, SOLUTION INTRAVENOUS; SUBCUTANEOUS
Status: DISCONTINUED | OUTPATIENT
Start: 2019-06-29 | End: 2019-07-02 | Stop reason: HOSPADM

## 2019-06-29 RX ORDER — IPRATROPIUM BROMIDE AND ALBUTEROL SULFATE 2.5; .5 MG/3ML; MG/3ML
3 SOLUTION RESPIRATORY (INHALATION)
Status: DISCONTINUED | OUTPATIENT
Start: 2019-06-29 | End: 2019-06-29

## 2019-06-29 RX ORDER — SIMETHICONE 80 MG
80 TABLET,CHEWABLE ORAL
Status: DISCONTINUED | OUTPATIENT
Start: 2019-06-29 | End: 2019-07-02 | Stop reason: HOSPADM

## 2019-06-29 RX ORDER — MONTELUKAST SODIUM 10 MG/1
10 TABLET ORAL
Status: DISCONTINUED | OUTPATIENT
Start: 2019-06-29 | End: 2019-07-02 | Stop reason: HOSPADM

## 2019-06-29 RX ORDER — DEXTROSE MONOHYDRATE 100 MG/ML
125-250 INJECTION, SOLUTION INTRAVENOUS AS NEEDED
Status: DISCONTINUED | OUTPATIENT
Start: 2019-06-29 | End: 2019-07-02 | Stop reason: HOSPADM

## 2019-06-29 RX ORDER — DEXTROSE 50 % IN WATER (D50W) INTRAVENOUS SYRINGE
25-50 AS NEEDED
Status: DISCONTINUED | OUTPATIENT
Start: 2019-06-29 | End: 2019-06-29

## 2019-06-29 RX ORDER — PREDNISONE 20 MG/1
60 TABLET ORAL
Status: COMPLETED | OUTPATIENT
Start: 2019-06-29 | End: 2019-06-29

## 2019-06-29 RX ADMIN — MONTELUKAST 10 MG: 10 TABLET, FILM COATED ORAL at 22:01

## 2019-06-29 RX ADMIN — IPRATROPIUM BROMIDE AND ALBUTEROL SULFATE 3 ML: .5; 3 SOLUTION RESPIRATORY (INHALATION) at 20:34

## 2019-06-29 RX ADMIN — ENOXAPARIN SODIUM 40 MG: 40 INJECTION SUBCUTANEOUS at 18:00

## 2019-06-29 RX ADMIN — IPRATROPIUM BROMIDE 0.5 MG: 0.5 SOLUTION RESPIRATORY (INHALATION) at 12:11

## 2019-06-29 RX ADMIN — MAGNESIUM SULFATE HEPTAHYDRATE 2 G: 40 INJECTION, SOLUTION INTRAVENOUS at 17:02

## 2019-06-29 RX ADMIN — LEVOFLOXACIN 750 MG: 5 INJECTION, SOLUTION INTRAVENOUS at 17:03

## 2019-06-29 RX ADMIN — ALBUTEROL SULFATE 10 MG: 2.5 SOLUTION RESPIRATORY (INHALATION) at 12:11

## 2019-06-29 RX ADMIN — INSULIN GLARGINE 30 UNITS: 100 INJECTION, SOLUTION SUBCUTANEOUS at 22:02

## 2019-06-29 RX ADMIN — PREDNISONE 60 MG: 20 TABLET ORAL at 12:11

## 2019-06-29 RX ADMIN — INSULIN LISPRO 10 UNITS: 100 INJECTION, SOLUTION INTRAVENOUS; SUBCUTANEOUS at 22:03

## 2019-06-29 RX ADMIN — ALBUTEROL SULFATE 10 MG: 2.5 SOLUTION RESPIRATORY (INHALATION) at 14:40

## 2019-06-29 NOTE — PROGRESS NOTES
1800- TRANSFER - IN REPORT:    Verbal report received from Maxine RN(name) on Tomasz Imus  being received from ED(unit) for routine progression of care      Report consisted of patients Situation, Background, Assessment and   Recommendations(SBAR). Information from the following report(s) SBAR, Kardex, Procedure Summary, Intake/Output, MAR and Recent Results was reviewed with the receiving nurse. Opportunity for questions and clarification was provided. Assessment completed upon patients arrival to unit and care assumed. Assessment performed. Pt in good spirits, eating dinner, speaking with MD. Pt has no complaints at this time. Bedside shift change report given to The Thang (oncoming nurse) by Candice Bhat RN (offgoing nurse). Report included the following information SBAR, Kardex, Procedure Summary, Intake/Output, MAR and Recent Results.

## 2019-06-29 NOTE — ED NOTES
TRANSFER - OUT REPORT:    Verbal report given to [facility] RN(name) on Narcisa Scott  being transferred to [facility](unit) for routine progression of care       Report consisted of patients Situation, Background, Assessment and   Recommendations(SBAR). Information from the following report(s) SBAR and Kardex was reviewed with the receiving nurse. Lines:   Peripheral IV 06/29/19 Right Forearm (Active)   Site Assessment Clean, dry, & intact 6/29/2019 11:37 AM   Phlebitis Assessment 0 6/29/2019 11:37 AM   Infiltration Assessment 0 6/29/2019 11:37 AM   Dressing Status Clean, dry, & intact 6/29/2019 11:37 AM   Dressing Type Tape;Transparent 6/29/2019 11:37 AM   Hub Color/Line Status Pink;Flushed;Patent 6/29/2019 11:37 AM   Action Taken Blood drawn 6/29/2019 11:37 AM        Opportunity for questions and clarification was provided.       Patient transported with:   Monitor  Registered Nurse

## 2019-06-29 NOTE — ED PROVIDER NOTES
EMERGENCY DEPARTMENT HISTORY AND PHYSICAL EXAM    1:36 PM  Date: 6/29/2019  Patient Name: Sandro Phillips    History of Presenting Illness     Chief Complaint   Patient presents with    Shortness of Breath        History Provided By: Patient    HPI: Sandro Phillips is a 60 y.o. female with hx of COPD, SHERRIE, DM, HTN. Pt is c/o sob for the past few days, saw her pulmo and she was give prednisone 10 every other day and started on doxy. Pt reports increased sputum production and chest tightness, uses nebs q4 hr. Denies fever, chills, cp, nausea or vomiting. No ap or diarrhea. No increase in her O2 demands, 2L at home. She had not been using her CPAP for the past 3-4 days as she had not been able to sleep due to sob    Location:  Severity:  Timing/course:    Onset/Duration:     PCP: Taniya Carnes MD    Past History     Past Medical History:  Past Medical History:   Diagnosis Date    Asthma     Chronic lung disease     COPD     Cystocele, midline     Diabetes mellitus ([facility] [address]     GERD (gastroesophageal reflux disease)     Hidradenitis suppurativa     Hyperlipidemia     Hypertension     SHERRIE on CPAP     CPAP    Stress incontinence        Past Surgical History:  Past Surgical History:   Procedure Laterality Date    BREAST SURGERY PROCEDURE UNLISTED      Right breast benign tumor removal    HX APPENDECTOMY      HX CHOLECYSTECTOMY      HX DILATION AND CURETTAGE      Dysfunctional uterine bleeding, thought 2/2 fibroids    HX TUBAL LIGATION         Family History:  Family History   Problem Relation Age of Onset    Hypertension Mother     Stroke Mother     Breast Cancer Mother         Bilateral mastectomies    Cancer Mother         ovarian and breast    Heart Failure Mother     Heart Attack Father         2011    Heart Surgery Father         CABG    Heart Failure Father     COPD Sister         Heavy smoker    Cancer Sister         ovarian    Heart Failure Sister     Lung Disease Sister     Asthma Child     Cancer Maternal Aunt         pancreatic    Cancer Maternal Grandfather         stomach       Social History:  Social History     Tobacco Use    Smoking status: Former Smoker     Packs/day: 1.00     Years: 30.00     Pack years: 30.00     Types: Cigarettes     Start date: 1966     Last attempt to quit: 2006     Years since quittin.8    Smokeless tobacco: Never Used    Tobacco comment: 1-1.5 packs per day   Substance Use Topics    Alcohol use: No    Drug use: No       Allergies: Allergies   Allergen Reactions    Ancef [Cefazolin] Hives    Contrast Agent [Iodine] Anaphylaxis, Shortness of Breath and Swelling     Needs pre-medication for IV contrast with Benadryl, Solu-Medrol    Fish Containing Products Anaphylaxis     Pt states she had a severe allergic reaction at 9 y/o.  Metformin Other (comments)     Abdominal pain, diarrhea.  Codeine Other (comments)     Altered mental status       Review of Systems   Review of Systems   Respiratory: Positive for cough, chest tightness, shortness of breath and wheezing. All other systems reviewed and are negative. Physical Exam     Patient Vitals for the past 12 hrs:   Temp Pulse Resp BP SpO2   19 1500 -- 85 27 130/53 96 %   19 1445 -- 79 23 118/53 99 %   19 1430 -- 82 21 137/70 97 %   19 1415 -- 76 24 145/65 97 %   19 1400 -- 85 21 134/70 97 %   19 1345 -- 79 23 127/56 96 %   19 1330 -- 78 24 135/64 97 %   19 1315 -- 84 25 130/63 97 %   19 1300 -- 77 20 131/64 97 %   19 1245 -- 84 24 140/73 96 %   19 1145 -- 85 24 129/60 95 %   19 1130 -- 88 25 160/63 96 %   19 1110 98 °F (36.7 °C) 96 25 152/66 97 %       Physical Exam   Constitutional: She is oriented to person, place, and time. She appears well-developed and well-nourished. HENT:   Head: Normocephalic and atraumatic.    Eyes: Conjunctivae and EOM are normal.   Neck: Normal range of motion. Neck supple. Cardiovascular: Normal rate, regular rhythm and normal heart sounds. Pulmonary/Chest: She is in respiratory distress. She has wheezes. Speaks 1-2 words at a time   Musculoskeletal: Normal range of motion. Neurological: She is alert and oriented to person, place, and time. Skin: Skin is warm and dry. Psychiatric: She has a normal mood and affect. Her behavior is normal.       Diagnostic Study Results     Labs -  Recent Results (from the past 12 hour(s))   CBC WITH AUTOMATED DIFF    Collection Time: 06/29/19 11:32 AM   Result Value Ref Range    WBC 6.2 4.6 - 13.2 K/uL    RBC 4.42 4.20 - 5.30 M/uL    HGB 13.6 12.0 - 16.0 g/dL    HCT 38.3 35.0 - 45.0 %    MCV 86.7 74.0 - 97.0 FL    MCH 30.8 24.0 - 34.0 PG    MCHC 35.5 31.0 - 37.0 g/dL    RDW 13.6 11.6 - 14.5 %    PLATELET 387 812 - 737 K/uL    MPV 8.9 (L) 9.2 - 11.8 FL    NEUTROPHILS 57 40 - 73 %    LYMPHOCYTES 32 21 - 52 %    MONOCYTES 5 3 - 10 %    EOSINOPHILS 6 (H) 0 - 5 %    BASOPHILS 0 0 - 2 %    ABS. NEUTROPHILS 3.6 1.8 - 8.0 K/UL    ABS. LYMPHOCYTES 2.0 0.9 - 3.6 K/UL    ABS. MONOCYTES 0.3 0.05 - 1.2 K/UL    ABS. EOSINOPHILS 0.3 0.0 - 0.4 K/UL    ABS. BASOPHILS 0.0 0.0 - 0.1 K/UL    DF AUTOMATED     METABOLIC PANEL, COMPREHENSIVE    Collection Time: 06/29/19 11:32 AM   Result Value Ref Range    Sodium 142 136 - 145 mmol/L    Potassium 4.0 3.5 - 5.5 mmol/L    Chloride 106 100 - 108 mmol/L    CO2 30 21 - 32 mmol/L    Anion gap 6 3.0 - 18 mmol/L    Glucose 213 (H) 74 - 99 mg/dL    BUN 10 7.0 - 18 MG/DL    Creatinine 0.90 0.6 - 1.3 MG/DL    BUN/Creatinine ratio 11 (L) 12 - 20      GFR est AA >60 >60 ml/min/1.73m2    GFR est non-AA >60 >60 ml/min/1.73m2    Calcium 9.5 8.5 - 10.1 MG/DL    Bilirubin, total 0.8 0.2 - 1.0 MG/DL    ALT (SGPT) 45 13 - 56 U/L    AST (SGOT) 24 15 - 37 U/L    Alk.  phosphatase 93 45 - 117 U/L    Protein, total 7.5 6.4 - 8.2 g/dL    Albumin 3.7 3.4 - 5.0 g/dL    Globulin 3.8 2.0 - 4.0 g/dL    A-G Ratio 1.0 0.8 - 1. 7     POC G3    Collection Time: 06/29/19  2:31 PM   Result Value Ref Range    Device: NASAL CANNULA      Flow rate (POC) 2 L/M    FIO2 (POC) 28 %    pH (POC) 7.370 7.35 - 7.45      pCO2 (POC) 48.6 (H) 35.0 - 45.0 MMHG    pO2 (POC) 81 80 - 100 MMHG    HCO3 (POC) 28.1 (H) 22 - 26 MMOL/L    sO2 (POC) 95 92 - 97 %    Base excess (POC) 2 mmol/L    Allens test (POC) YES      Total resp. rate 23      Site RIGHT RADIAL      Patient temp. 36.7      Specimen type (POC) ARTERIAL      Performed by Margarita Dockery        Radiologic Studies -   Xr Chest Pa Lat    Result Date: 6/29/2019  IMPRESSION: Similar likely mild atelectasis lung bases. .        Medical Decision Making     ED Course: Progress Notes, Reevaluation, and Consults:    1:36 PM Initial assessment performed. The patients presenting problems have been discussed, and they/their family are in agreement with the care plan formulated and outlined with them. I have encouraged them to ask questions as they arise throughout their visit. 1:48 PM  Pt is still very wheezy with poor air movement. Will order another neb then reeval for dispo  3:41 PM  Pt is still very wheezy and cant speak in full sentences. Will order mag, bipap, IV abx and admit    Provider Notes (Medical Decision Making):   Pt with hx of COPD seems to be in exacerbation of her chronic condition. VS are stable, not tachy or hypoxic, not concerned for resp failure. Will check EKG, blood gas and labs to assess her general condition. She is already on doxy and sputum cx ordered by her pulm shows gm +cocci. Will give more nebs, steroids and cont with the current plan. Unlikely to be PE as the presentation is going more with COPD flare. 4:09 PM  Discussed with Dr Lila Berg, agreed to admit  4:12 PM  Pt is listed under family practice, will page  4:58 PM  Family practice aware.  Plan discussed will admit to stepdown    Procedures:     Critical Care Time: Critical Care Time:  The services I provided to this patient were to treat and/or prevent clinically significant deterioration that could result in the failure of one or more body systems and/or organ systems due to respiratory distress, COPD exacerbation. Services included the following:  -reviewing nursing notes and old charts  -vital sign assessments  -direct patient care  -medication orders and management  -interpreting and reviewing diagnostic studies/labs  -re-evaluations  -documentation time    Aggregate critical care time was 35 minutes, which includes only time during which I was engaged in work directly related to the patient's care as described above, whether I was at bedside or elsewhere in the Emergency Department. It did not include time spent performing other reported procedures or the services of residents, students, nurses, or advance practice providers. Montana Peña MD    4:10 PM      Vital Signs-Reviewed the patient's vital signs. Reviewed pt's pulse ox reading. EKG: Interpreted by the EP. Time Interpreted:    Rate:    Rhythm: Normal Sinus Rhythm 82   Interpretation: no sig changes   Comparison: no sign interval changes    Records Reviewed: Nursing Notes, Old Medical Records, Previous electrocardiograms, Previous Radiology Studies and Previous Laboratory Studies (Time of Review: 1:36 PM)  -I am the first provider for this patient.  -I reviewed the vital signs, available nursing notes, past medical history, past surgical history, family history and social history. Current Facility-Administered Medications   Medication Dose Route Frequency Provider Last Rate Last Dose    magnesium sulfate 2 g/50 ml IVPB (premix or compounded)  2 g IntraVENous NOW Montana Peña MD        levoFLOXacin (LEVAQUIN) 750 mg in D5W IVPB  750 mg IntraVENous NOW Montana Peña MD         Current Outpatient Medications   Medication Sig Dispense Refill    doxycycline (MONODOX) 100 mg capsule Take 1 Cap by mouth two (2) times a day.  14 Cap 0    predniSONE (STERAPRED DS) 10 mg dose pack As directed 48 Tab 0    sodium chloride (HYPER-SAL) 3.5 % nebu Take 1 Ampule by inhalation two (2) times a day. 60 Vial 6    albuterol sulfate (PROVENTIL;VENTOLIN) 2.5 mg/0.5 mL nebu nebulizer solution 0.5 mL by Nebulization route four (4) times daily as needed for Wheezing. To be used with HyperSal nebulizer solution. ICD code: COPD J44.1 60 mL 3    predniSONE (DELTASONE) 10 mg tablet 1 tablet with breakfast every other morning 30 Tab 1    predniSONE (DELTASONE) 10 mg tablet Prednisone 10mg tabs: p.o.  2 tabs daily for 3 days then drop to   1 tabs daily for 2 days then drop to   Your regularly scheduled dose of 1 tab every other day. Dispense 10 tabs  Dispense 30 tabs  Indications: COPD exacerbation 10 Tab 0    roflumilast (DALIRESP) 250 mcg tab Take 1 Tab by mouth daily. for four weeks then call MD for further direction. 28 Tab 0    mometasone/formoterol (DULERA IN) Take 1 Puff by inhalation daily. Indications: Bronchospasm Prevention with COPD, per pt      fluticasone propionate (FLONASE ALLERGY RELIEF) 50 mcg/actuation nasal spray 2 Sprays by Both Nostrils route daily.  fluticasone propion-salmeterol (ADVAIR HFA) 230-21 mcg/actuation inhaler Take 2 Puffs by inhalation two (2) times a day.  hydroCHLOROthiazide (HYDRODIURIL) 12.5 mg tablet Take 12.5 mg by mouth daily.  tiotropium bromide (SPIRIVA RESPIMAT) 2.5 mcg/actuation inhaler Take 2 Puffs by inhalation daily.  insulin glargine (LANTUS,BASAGLAR) 100 unit/mL (3 mL) inpn 35 Units by SubCUTAneous route nightly. (Patient taking differently: 30 Units by SubCUTAneous route nightly.) 28 Units 0    albuterol-ipratropium (DUO-NEB) 2.5 mg-0.5 mg/3 ml nebu 3 mL by Nebulization route every six (6) hours as needed. (Patient taking differently: 3 mL by Nebulization route every six (6) hours as needed for Cough.  as needed for cough, dyspnea) 30 Nebule 0    albuterol sulfate 90 mcg/actuation aepb Take 1 Puff by inhalation every four (4) hours. 1 Inhaler 0    NOVOLOG FLEXPEN U-100 INSULIN 100 unit/mL inpn Continue home Sliding scale insulin as prior to admission (Patient taking differently: 1 Units by SubCUTAneous route three (3) times daily. If BG <100=0u  101-150=5u  151-250=8u  251-300=12u  >300 call MD  ) 1 Pen 0    omeprazole (PRILOSEC) 40 mg capsule Take 40 mg by mouth daily. Indications: gastroesophageal reflux disease      lisinopril (PRINIVIL, ZESTRIL) 40 mg tablet Take 20 mg by mouth two (2) times a day. Indications: high blood pressure      simethicone (MYLICON) 80 mg chewable tablet Take 80 mg by mouth every six (6) hours as needed for Flatulence.  cpap machine kit by Does Not Apply route nightly. M.E.D.      OXYGEN-AIR DELIVERY SYSTEMS 2 L by Nasal route continuous. [facility] Choice      aspirin delayed-release 81 mg tablet Take 81 mg by mouth daily.  famotidine (PEPCID) 20 mg tablet Take 20 mg by mouth two (2) times daily as needed for Other (reflux).  montelukast (SINGULAIR) 10 mg tablet Take 10 mg by mouth nightly. Clinical Impression     Clinical Impression:   1. Acute exacerbation of chronic obstructive pulmonary disease (COPD) ([address]        Disposition: admit        This note was dictated utilizing voice recognition software which may lead to typographical errors. I apologize in advance if the situation occurs. If questions arise please do not hesitate to contact me or call our department.     Montana Persaud MD  1:36 PM

## 2019-06-29 NOTE — H&P
Admission History and Physical  [facility]      Patient: Tomasz Joyner MRN: [id number]  [facility]: [id number]    YOB: 1960  Age: 60 y.o. Sex: female      DOA: 6/29/2019       HPI:     Tomasz Joyner is a 60 y.o. female with PMH of COPD on home O2, IDDM2, HTN, HFpEF, SHERRIE on CPAP, GERD, and allergic rhinitis, now presenting with complaint of increasing and worsening shortness of breath. Patient reports that she was seen in both her PCP office ([facility]) and her pulmonologist office (Dr. Gerald El) on either Monday or Tuesday of this week. At that time, patient reports that she was mildly short of breath but was having increased sputum production at that time. Patient described her sputum as thick and dark-green. Patient reports at that time, Dr. Gerald El obtained a sputum culture. Patient reports that over the past few days since both of those office visits, she has been having increased wheezing, increased sputum production of now light green sputum, and worsening shortness of breath to the point where she cannot even say a complete sentence. Patient reported having chest tightness and difficulty moving air through her lungs. Patient denies any fevers/chills, chest pain, abdominal pain, n/v/c/d, urinary symptoms, or swelling in her legs. Patient reports that Dr. Kady Kumar office called her and advised her to  prescription from her local pharmacy for Doxycycline. Patient reports that by the time she started those oral antibiotics, she was already to forgone and very tachypneic. Of note, patient had recent hospitalization from 5/27/19 - 5/31/19 for COPD Exacerbation that improvement with PO steroid burst and duo-neb treatments. Patient has had multiple hospitalizations for COPD Exacerbation, with roughly 9-10 hospitalizations in the last 2 years.     ED Course:  Labs: CBC, CMP, ABG  Imaging/EKG/Radiology: CXR  Medications: Duo-neb x1, Albuterol neb x1, Prednisone 60 mg x1  Drips/Infusions: IV Levaquin 750 mg x1, IV Magnesium 2 g x1    Review of Systems -   General ROS: negative for chills, fever, night sweats, weight gain and weight loss  Psychological ROS: negative for anxiety and depression  Ophthalmic ROS: negative for blurry vision, decreased vision or loss of vision  ENT ROS: negative for headaches, hearing change or visual changes  Hematological and Lymphatic ROS: negative for bruising, jaundice  Respiratory ROS: negative for cough, hemoptysis, shortness of breath, orthopnea, paroxysmal dyspnea, or wheezing  Cardiovascular ROS: negative for chest pain, dyspnea on exertion, edema, loss of consciousness, or palpitations   Gastrointestinal ROS: negative for abdominal pain, blood in stools, change in stools, constipation, diarrhea, hematemesis, melena, nausea/vomiting or swallowing difficulty/pain  Genito-Urinary ROS: negative for dysuria, hematuria or urinary frequency/urgency  Musculoskeletal ROS: negative for joint pain, joint swelling or muscle pain  Neurological ROS: negative for dizziness, headaches, numbness/tingling or weakness  Dermatological ROS: negative for rash or skin lesion changes    Past Medical History:   Diagnosis Date    Asthma     Chronic lung disease     COPD     Cystocele, midline     Diabetes mellitus ([facility] [address]     GERD (gastroesophageal reflux disease)     Hidradenitis suppurativa     Hyperlipidemia     Hypertension     SHERRIE on CPAP     CPAP    Stress incontinence        Past Surgical History:   Procedure Laterality Date    BREAST SURGERY PROCEDURE UNLISTED      Right breast benign tumor removal    HX APPENDECTOMY      HX CHOLECYSTECTOMY      HX DILATION AND CURETTAGE      Dysfunctional uterine bleeding, thought 2/2 fibroids    HX TUBAL LIGATION         Family History   Problem Relation Age of Onset    Hypertension Mother     Stroke Mother     Breast Cancer Mother         Bilateral mastectomies    Cancer Mother ovarian and breast    Heart Failure Mother     Heart Attack Father             Heart Surgery Father         CABG    Heart Failure Father     COPD Sister         Heavy smoker    Cancer Sister         ovarian    Heart Failure Sister     Lung Disease Sister     Asthma Child     Cancer Maternal Aunt         pancreatic    Cancer Maternal Grandfather         stomach       Social History     Socioeconomic History    Marital status: LEGALLY      Spouse name: Not on file    Number of children: Not on file    Years of education: Not on file    Highest education level: Not on file   Tobacco Use    Smoking status: Former Smoker     Packs/day: 1.00     Years: 30.00     Pack years: 30.00     Types: Cigarettes     Start date: 1966     Last attempt to quit: 2006     Years since quittin.8    Smokeless tobacco: Never Used    Tobacco comment: 1-1.5 packs per day   Substance and Sexual Activity    Alcohol use: No    Drug use: No    Sexual activity: Never       Allergies   Allergen Reactions    Ancef [Cefazolin] Hives    Contrast Agent [Iodine] Anaphylaxis, Shortness of Breath and Swelling     Needs pre-medication for IV contrast with Benadryl, Solu-Medrol    Fish Containing Products Anaphylaxis     Pt states she had a severe allergic reaction at 7 y/o.  Metformin Other (comments)     Abdominal pain, diarrhea.  Codeine Other (comments)     Altered mental status       Prior to Admission Medications   Prescriptions Last Dose Informant Patient Reported? Taking? NOVOLOG FLEXPEN U-100 INSULIN 100 unit/mL inpn Unknown at Unknown time Self No No   Sig: Continue home Sliding scale insulin as prior to admission   Patient taking differently: 1 Units by SubCUTAneous route three (3) times daily. If BG <100=0u  101-150=5u  151-250=8u  251-300=12u  >300 call MD     OXYGEN-AIR DELIVERY SYSTEMS Unknown at Unknown time  Yes No   Si L by Nasal route continuous.  First Choice   albuterol sulfate (PROVENTIL;VENTOLIN) 2.5 mg/0.5 mL nebu nebulizer solution Unknown at Unknown time  No No   Si.5 mL by Nebulization route four (4) times daily as needed for Wheezing. To be used with HyperSal nebulizer solution. ICD code: COPD J44.1   albuterol sulfate 90 mcg/actuation aepb Unknown at Unknown time  No No   Sig: Take 1 Puff by inhalation every four (4) hours. aspirin delayed-release 81 mg tablet Unknown at Unknown time  Yes No   Sig: Take 81 mg by mouth daily. cetirizine (ZYRTEC) 10 mg tablet   Yes Yes   Sig: Take  by mouth daily as needed for Allergies or Rhinitis. cpap machine kit Unknown at Unknown time  Yes No   Sig: by Does Not Apply route nightly. M.E.D.   doxycycline (MONODOX) 100 mg capsule Unknown at Unknown time  No No   Sig: Take 1 Cap by mouth two (2) times a day. famotidine (PEPCID) 20 mg tablet Unknown at Unknown time  Yes No   Sig: Take 20 mg by mouth two (2) times daily as needed for Other (reflux). fluticasone propion-salmeterol (ADVAIR HFA) 230-21 mcg/actuation inhaler Unknown at Unknown time  Yes No   Sig: Take 2 Puffs by inhalation two (2) times a day. fluticasone propionate (FLONASE ALLERGY RELIEF) 50 mcg/actuation nasal spray Unknown at Unknown time  Yes No   Si Sprays by Both Nostrils route daily. hydroCHLOROthiazide (HYDRODIURIL) 12.5 mg tablet Unknown at Unknown time  Yes No   Sig: Take 12.5 mg by mouth daily. insulin glargine (LANTUS,BASAGLAR) 100 unit/mL (3 mL) inpn Unknown at Unknown time  No No   Si Units by SubCUTAneous route nightly. Patient taking differently: 30 Units by SubCUTAneous route nightly. lisinopril (PRINIVIL, ZESTRIL) 40 mg tablet Unknown at Unknown time  Yes No   Sig: Take 20 mg by mouth two (2) times a day. Indications: high blood pressure   montelukast (SINGULAIR) 10 mg tablet Unknown at Unknown time  Yes No   Sig: Take 10 mg by mouth nightly.    omeprazole (PRILOSEC) 40 mg capsule Unknown at Unknown time  Yes No   Sig: Take 40 mg by mouth daily. Indications: gastroesophageal reflux disease   predniSONE (DELTASONE) 10 mg tablet Unknown at Unknown time  No No   Si tablet with breakfast every other morning   roflumilast (DALIRESP) 250 mcg tab Not Taking at Unknown time  No No   Sig: Take 1 Tab by mouth daily. for four weeks then call MD for further direction. simethicone (MYLICON) 80 mg chewable tablet Unknown at Unknown time  Yes No   Sig: Take 80 mg by mouth every six (6) hours as needed for Flatulence. tiotropium bromide (SPIRIVA RESPIMAT) 2.5 mcg/actuation inhaler Unknown at Unknown time  Yes No   Sig: Take 2 Puffs by inhalation daily. Facility-Administered Medications: None       Physical Exam:     Patient Vitals for the past 24 hrs:   Temp Pulse Resp BP SpO2   19 1800 97.5 °F (36.4 °C) 90 18 144/87 95 %   19 1708 -- 86 16 -- 96 %   19 1630 -- 88 18 110/65 98 %   19 1615 -- 86 23 109/50 96 %   19 1600 -- 86 24 120/54 95 %   19 1545 -- 86 23 118/56 96 %   19 1530 -- 88 23 117/55 96 %   19 1515 -- 84 16 136/54 97 %   19 1500 -- 85 27 130/53 96 %   19 1445 -- 79 23 118/53 99 %   19 1430 -- 82 21 137/70 97 %   19 1415 -- 76 24 145/65 97 %   19 1400 -- 85 21 134/70 97 %   19 1345 -- 79 23 127/56 96 %   19 1330 -- 78 24 135/64 97 %   19 1315 -- 84 25 130/63 97 %   19 1300 -- 77 20 131/64 97 %   19 1245 -- 84 24 140/73 96 %   19 1145 -- 85 24 129/60 95 %   19 1130 -- 88 25 160/63 96 %   19 1110 98 °F (36.7 °C) 96 25 152/66 97 %       Physical Exam:  General:  Alert and responsive and in mild respiratory distress. HEENT: Conjunctiva pink, sclera anicteric. EOMI. Pharynx moist, nonerythematous. Moist mucous membranes. Thyroid not enlarged, no nodules. Lymph: No cervical, supraclavicular, occipital or submandibular lymphadenopathy. No other gross abnormalities present. CV:  RRR, no murmurs/rubs/gallops.  No visible pulsations or thrills. Resp:  Mildly labored breathing. Decreased breath sounds in bilateral lung bases. Diffuse end-expiratory wheeze, no rales or rhonchi. Equal expansion bilaterally. Abd:  Soft, nontender, nondistended. No hepatosplenomegaly. No suprapubic tenderness. No CVA tenderness. Rectal:  Deferred  MS:  No joint deformity or instability. No atrophy. Neuro: A+Ox3. 5/5 strength bilateral upper extremities and lower extremities. Ext:  Trace edema bilaterally. 2+ radial and dp pulses bilaterally. Skin:  No rashes, lesions, or ulcers. Good turgor. IMAGING:     Xr Chest Pa Lat    Result Date: 6/29/2019  IMPRESSION: Similar likely mild atelectasis lung bases. .    Recent Results (from the past 12 hour(s))   CBC WITH AUTOMATED DIFF    Collection Time: 06/29/19 11:32 AM   Result Value Ref Range    WBC 6.2 4.6 - 13.2 K/uL    RBC 4.42 4.20 - 5.30 M/uL    HGB 13.6 12.0 - 16.0 g/dL    HCT 38.3 35.0 - 45.0 %    MCV 86.7 74.0 - 97.0 FL    MCH 30.8 24.0 - 34.0 PG    MCHC 35.5 31.0 - 37.0 g/dL    RDW 13.6 11.6 - 14.5 %    PLATELET 218 200 - 230 K/uL    MPV 8.9 (L) 9.2 - 11.8 FL    NEUTROPHILS 57 40 - 73 %    LYMPHOCYTES 32 21 - 52 %    MONOCYTES 5 3 - 10 %    EOSINOPHILS 6 (H) 0 - 5 %    BASOPHILS 0 0 - 2 %    ABS. NEUTROPHILS 3.6 1.8 - 8.0 K/UL    ABS. LYMPHOCYTES 2.0 0.9 - 3.6 K/UL    ABS. MONOCYTES 0.3 0.05 - 1.2 K/UL    ABS. EOSINOPHILS 0.3 0.0 - 0.4 K/UL    ABS.  BASOPHILS 0.0 0.0 - 0.1 K/UL    DF AUTOMATED     METABOLIC PANEL, COMPREHENSIVE    Collection Time: 06/29/19 11:32 AM   Result Value Ref Range    Sodium 142 136 - 145 mmol/L    Potassium 4.0 3.5 - 5.5 mmol/L    Chloride 106 100 - 108 mmol/L    CO2 30 21 - 32 mmol/L    Anion gap 6 3.0 - 18 mmol/L    Glucose 213 (H) 74 - 99 mg/dL    BUN 10 7.0 - 18 MG/DL    Creatinine 0.90 0.6 - 1.3 MG/DL    BUN/Creatinine ratio 11 (L) 12 - 20      GFR est AA >60 >60 ml/min/1.73m2    GFR est non-AA >60 >60 ml/min/1.73m2    Calcium 9.5 8.5 - 10.1 MG/DL    Bilirubin, total 0.8 0.2 - 1.0 MG/DL    ALT (SGPT) 45 13 - 56 U/L    AST (SGOT) 24 15 - 37 U/L    Alk. phosphatase 93 45 - 117 U/L    Protein, total 7.5 6.4 - 8.2 g/dL    Albumin 3.7 3.4 - 5.0 g/dL    Globulin 3.8 2.0 - 4.0 g/dL    A-G Ratio 1.0 0.8 - 1.7     POC G3    Collection Time: 06/29/19  2:31 PM   Result Value Ref Range    Device: NASAL CANNULA      Flow rate (POC) 2 L/M    FIO2 (POC) 28 %    pH (POC) 7.370 7.35 - 7.45      pCO2 (POC) 48.6 (H) 35.0 - 45.0 MMHG    pO2 (POC) 81 80 - 100 MMHG    HCO3 (POC) 28.1 (H) 22 - 26 MMOL/L    sO2 (POC) 95 92 - 97 %    Base excess (POC) 2 mmol/L    Allens test (POC) YES      Total resp. rate 23      Site RIGHT RADIAL      Patient temp. 36.7      Specimen type (POC) ARTERIAL      Performed by Renea Gomez        Assessment/Plan:   60 y.o. female with PMH of COPD on home O2, IDDM2, HTN, HFpEF, SHERRIE on CPAP, GERD, and allergic rhinitis, now admitted with acute COPD exacerbation. Acute COPD Exacerbation w/Chronic Hypoxic Respiratory Failure: H/o moderate COPD, GOLD 2. On home supplemental O2 @ 2 LPM at home. Presents to [facility] ED after 4 days of worsening shortness of breath, dyspnea on exertion, chest tightness, and increased sputum production or greenish sputum. Respiratory Cx (6/25/19) showed many normal respiratory samina with moderate GPC, few GNR, and moderate gram(-) diplococci. Prescribed Doxycylcine by primary pulmonologist, Dr. Haleigh Mancilla, without any improvement in sputum production or increased work of breathing. Met 1/4 SIRS criteria (tachypnea) 2/2 COPD exacerbation. Noted to have diffuse end-expiratory wheezing on physical exam. Given duo-neb x1 en route to [facility] ED. Given duo-neb x1, albuterol neb x1, PO prednisione 60 mg x1, IV magnesium 2 g x1 in ED. CXR showed mild atelectasis in bilateral lung bases.  Started on IV levaquin 750 mg in [facility].  - Admit to CVT Stepdown, will downgrade to telemetry if not requiring increased O2 in AM  - F/u Sputum Cx  - Duo-nebs q4h per RT  - PO Prednisone 60 mg daily for 5 days; will plan to taper to home Prednisone 10 mg QOD  - Continue IV Levaquin 750 mg q24h, will transition to PO Levaquin for COPD Exacerbation as status improves  - Continue home supplemental O2 @ 2 LPM, may titrate supplemental O2 to maintain SpO2 between 88-92%, wean appropriately  - Continue home Singluar 10 mg QHS  - Hold home Advair 2 puff BID  - Hold home Spiriva 1.25 mcg/act 2 puffs daily  - Daily CBC, BMP, Mag  - Monitor VS per unit routine  - Pulse oximetry per unit routine  - F/u PT/OT/CM recs     IDDM2: Last Hgb A1c: 8.2 (5/28/19). Cannot tolerate Metformin (nausea, diarrhea). - Continue home Lantus 30 units QHS  - SSI + Accuchecks ACHS  - Hypoglycemia protocol  - F/u Hgb A1c in AM  - Diabetic Low-Salt Diet     HTN, HFpEF: Admission BP ranging 100s-150s/50-70s. Last ECHO (7/2018): EF 66-70%; left ventricular mild concentric hypertrophy.   - Continue home ASA 81 mg daily  - Continue home Lisinopril 40 mg daily, will hold for now d/t soft BP  - Continue home HCTZ 12.5 mg daily, will hold for now d/t soft BP  - Monitor VS per unit routine  - Monitor I/O's per unit routine  - Daily BMP, Mag     GERD  - Hold home Omeprazole 40 mg daily, not on hospital formulary  - Start PO Protonix 40 mg daily  - Continue home Famotidine 20 mg BID PRN  - Continue home Simethicone 80 mg QID PRN  - Reflux precautions     Allergic Rhinitis  - Continue home Flonase 2 sprays daily  - Hold home Cetirizine 10 mg nightly PRN      Diet: Diabetic Low-Salt  DVT Prophylaxis: Lovenox  Code Status: FULL  Point of Contact: Vandana Martinez, Daughter, [phone]    Disposition and anticipated LOS: >2 midnights      Jame Garcia MD, PGY-2  [facility] Medicine  6/29/2019

## 2019-06-29 NOTE — ED TRIAGE NOTES
Pt called EMS with c/o shortness of breath. Pt states hx of COPD, CHF, asthma. PT states on 2L at home. Paramedics gave albuterol and duo-neb.  PT states feeling better but stilll trouble breathing

## 2019-06-30 LAB
ANION GAP SERPL CALC-SCNC: 5 MMOL/L (ref 3–18)
ARTERIAL PATENCY WRIST A: ABNORMAL
ATRIAL RATE: 82 BPM
BASE EXCESS BLDV CALC-SCNC: 2 MMOL/L
BASOPHILS # BLD: 0 K/UL (ref 0–0.1)
BASOPHILS NFR BLD: 0 % (ref 0–2)
BDY SITE: ABNORMAL
BODY TEMPERATURE: 98.6
BUN SERPL-MCNC: 10 MG/DL (ref 7–18)
BUN/CREAT SERPL: 12 (ref 12–20)
CALCIUM SERPL-MCNC: 9.7 MG/DL (ref 8.5–10.1)
CALCULATED P AXIS, ECG09: 43 DEGREES
CALCULATED R AXIS, ECG10: 23 DEGREES
CALCULATED T AXIS, ECG11: 47 DEGREES
CHLORIDE SERPL-SCNC: 103 MMOL/L (ref 100–108)
CO2 SERPL-SCNC: 31 MMOL/L (ref 21–32)
CREAT SERPL-MCNC: 0.81 MG/DL (ref 0.6–1.3)
DIAGNOSIS, 93000: NORMAL
DIFFERENTIAL METHOD BLD: ABNORMAL
EOSINOPHIL # BLD: 0 K/UL (ref 0–0.4)
EOSINOPHIL NFR BLD: 0 % (ref 0–5)
ERYTHROCYTE [DISTWIDTH] IN BLOOD BY AUTOMATED COUNT: 13.5 % (ref 11.6–14.5)
EST. AVERAGE GLUCOSE BLD GHB EST-MCNC: 186 MG/DL
GAS FLOW.O2 O2 DELIVERY SYS: ABNORMAL L/MIN
GLUCOSE BLD STRIP.AUTO-MCNC: 189 MG/DL (ref 70–110)
GLUCOSE BLD STRIP.AUTO-MCNC: 232 MG/DL (ref 70–110)
GLUCOSE BLD STRIP.AUTO-MCNC: 280 MG/DL (ref 70–110)
GLUCOSE BLD STRIP.AUTO-MCNC: 363 MG/DL (ref 70–110)
GLUCOSE BLD STRIP.AUTO-MCNC: 386 MG/DL (ref 70–110)
GLUCOSE SERPL-MCNC: 163 MG/DL (ref 74–99)
HBA1C MFR BLD: 8.1 % (ref 4.2–5.6)
HCO3 BLDV-SCNC: 28.4 MMOL/L (ref 23–28)
HCT VFR BLD AUTO: 37.6 % (ref 35–45)
HGB BLD-MCNC: 13 G/DL (ref 12–16)
LYMPHOCYTES # BLD: 1.2 K/UL (ref 0.9–3.6)
LYMPHOCYTES NFR BLD: 16 % (ref 21–52)
MAGNESIUM SERPL-MCNC: 2.1 MG/DL (ref 1.6–2.6)
MCH RBC QN AUTO: 30.1 PG (ref 24–34)
MCHC RBC AUTO-ENTMCNC: 34.6 G/DL (ref 31–37)
MCV RBC AUTO: 87 FL (ref 74–97)
MONOCYTES # BLD: 0.5 K/UL (ref 0.05–1.2)
MONOCYTES NFR BLD: 6 % (ref 3–10)
NEUTS SEG # BLD: 5.9 K/UL (ref 1.8–8)
NEUTS SEG NFR BLD: 78 % (ref 40–73)
P-R INTERVAL, ECG05: 144 MS
PCO2 BLDV: 53 MMHG (ref 41–51)
PH BLDV: 7.34 [PH] (ref 7.32–7.42)
PLATELET # BLD AUTO: 290 K/UL (ref 135–420)
PMV BLD AUTO: 9 FL (ref 9.2–11.8)
PO2 BLDV: 49 MMHG (ref 25–40)
POTASSIUM SERPL-SCNC: 3.7 MMOL/L (ref 3.5–5.5)
Q-T INTERVAL, ECG07: 394 MS
QRS DURATION, ECG06: 86 MS
QTC CALCULATION (BEZET), ECG08: 460 MS
RBC # BLD AUTO: 4.32 M/UL (ref 4.2–5.3)
SAO2 % BLDV: 81 % (ref 65–88)
SERVICE CMNT-IMP: ABNORMAL
SODIUM SERPL-SCNC: 139 MMOL/L (ref 136–145)
SPECIMEN TYPE: ABNORMAL
VENTRICULAR RATE, ECG03: 82 BPM
WBC # BLD AUTO: 7.6 K/UL (ref 4.6–13.2)

## 2019-06-30 PROCEDURE — 94760 N-INVAS EAR/PLS OXIMETRY 1: CPT

## 2019-06-30 PROCEDURE — 74011000250 HC RX REV CODE- 250: Performed by: STUDENT IN AN ORGANIZED HEALTH CARE EDUCATION/TRAINING PROGRAM

## 2019-06-30 PROCEDURE — 82962 GLUCOSE BLOOD TEST: CPT

## 2019-06-30 PROCEDURE — 97165 OT EVAL LOW COMPLEX 30 MIN: CPT

## 2019-06-30 PROCEDURE — 94660 CPAP INITIATION&MGMT: CPT

## 2019-06-30 PROCEDURE — 74011636637 HC RX REV CODE- 636/637: Performed by: FAMILY MEDICINE

## 2019-06-30 PROCEDURE — 74011250637 HC RX REV CODE- 250/637: Performed by: STUDENT IN AN ORGANIZED HEALTH CARE EDUCATION/TRAINING PROGRAM

## 2019-06-30 PROCEDURE — 94761 N-INVAS EAR/PLS OXIMETRY MLT: CPT

## 2019-06-30 PROCEDURE — 65270000029 HC RM PRIVATE

## 2019-06-30 PROCEDURE — 74011250636 HC RX REV CODE- 250/636: Performed by: STUDENT IN AN ORGANIZED HEALTH CARE EDUCATION/TRAINING PROGRAM

## 2019-06-30 PROCEDURE — 85025 COMPLETE CBC W/AUTO DIFF WBC: CPT

## 2019-06-30 PROCEDURE — 74011636637 HC RX REV CODE- 636/637: Performed by: STUDENT IN AN ORGANIZED HEALTH CARE EDUCATION/TRAINING PROGRAM

## 2019-06-30 PROCEDURE — 83735 ASSAY OF MAGNESIUM: CPT

## 2019-06-30 PROCEDURE — 80048 BASIC METABOLIC PNL TOTAL CA: CPT

## 2019-06-30 PROCEDURE — 83036 HEMOGLOBIN GLYCOSYLATED A1C: CPT

## 2019-06-30 PROCEDURE — 36415 COLL VENOUS BLD VENIPUNCTURE: CPT

## 2019-06-30 PROCEDURE — 97161 PT EVAL LOW COMPLEX 20 MIN: CPT

## 2019-06-30 PROCEDURE — 97535 SELF CARE MNGMENT TRAINING: CPT

## 2019-06-30 PROCEDURE — 94640 AIRWAY INHALATION TREATMENT: CPT

## 2019-06-30 PROCEDURE — 77010033678 HC OXYGEN DAILY

## 2019-06-30 RX ADMIN — INSULIN GLARGINE 30 UNITS: 100 INJECTION, SOLUTION SUBCUTANEOUS at 21:46

## 2019-06-30 RX ADMIN — IPRATROPIUM BROMIDE AND ALBUTEROL SULFATE 3 ML: .5; 3 SOLUTION RESPIRATORY (INHALATION) at 16:44

## 2019-06-30 RX ADMIN — IPRATROPIUM BROMIDE AND ALBUTEROL SULFATE 3 ML: .5; 3 SOLUTION RESPIRATORY (INHALATION) at 08:13

## 2019-06-30 RX ADMIN — PREDNISONE 60 MG: 20 TABLET ORAL at 08:37

## 2019-06-30 RX ADMIN — IPRATROPIUM BROMIDE AND ALBUTEROL SULFATE 3 ML: .5; 3 SOLUTION RESPIRATORY (INHALATION) at 19:53

## 2019-06-30 RX ADMIN — MONTELUKAST 10 MG: 10 TABLET, FILM COATED ORAL at 21:45

## 2019-06-30 RX ADMIN — INSULIN LISPRO 15 UNITS: 100 INJECTION, SOLUTION INTRAVENOUS; SUBCUTANEOUS at 17:18

## 2019-06-30 RX ADMIN — IPRATROPIUM BROMIDE AND ALBUTEROL SULFATE 3 ML: .5; 3 SOLUTION RESPIRATORY (INHALATION) at 11:42

## 2019-06-30 RX ADMIN — INSULIN LISPRO 2 UNITS: 100 INJECTION, SOLUTION INTRAVENOUS; SUBCUTANEOUS at 08:35

## 2019-06-30 RX ADMIN — IPRATROPIUM BROMIDE AND ALBUTEROL SULFATE 3 ML: .5; 3 SOLUTION RESPIRATORY (INHALATION) at 23:13

## 2019-06-30 RX ADMIN — IPRATROPIUM BROMIDE AND ALBUTEROL SULFATE 3 ML: .5; 3 SOLUTION RESPIRATORY (INHALATION) at 04:49

## 2019-06-30 RX ADMIN — ENOXAPARIN SODIUM 40 MG: 40 INJECTION SUBCUTANEOUS at 17:17

## 2019-06-30 RX ADMIN — INSULIN LISPRO 6 UNITS: 100 INJECTION, SOLUTION INTRAVENOUS; SUBCUTANEOUS at 21:48

## 2019-06-30 RX ADMIN — ASPIRIN 81 MG: 81 TABLET, COATED ORAL at 08:36

## 2019-06-30 RX ADMIN — PANTOPRAZOLE SODIUM 40 MG: 40 TABLET, DELAYED RELEASE ORAL at 08:36

## 2019-06-30 RX ADMIN — IPRATROPIUM BROMIDE AND ALBUTEROL SULFATE 3 ML: .5; 3 SOLUTION RESPIRATORY (INHALATION) at 00:32

## 2019-06-30 RX ADMIN — FLUTICASONE PROPIONATE 2 SPRAY: 50 SPRAY, METERED NASAL at 12:26

## 2019-06-30 RX ADMIN — INSULIN LISPRO 6 UNITS: 100 INJECTION, SOLUTION INTRAVENOUS; SUBCUTANEOUS at 12:25

## 2019-06-30 NOTE — PROGRESS NOTES
TRANSFER - IN REPORT:    Verbal report called to Ani Ordaz RN(name) on Madison Jensen  being received from T-sd(unit) for routine progression of care      Report consisted of patients Situation, Background, Assessment and   Recommendations(SBAR). Information from the following report(s) SBAR, Kardex, ED Summary and MAR was reviewed with the receiving nurse. Opportunity for questions and clarification was provided. Assessment completed upon patients arrival to unit and care assumed.      PT TO GO TO ROOM 471

## 2019-06-30 NOTE — DISCHARGE SUMMARY
Discharge Summary  [facility]      Patient: Kay Frost Age: 60 y.o. Sex: female  : 1960    MRN: [id number]      DOA: 2019      Discharge Date: 19      Porsche Hazel MD      PCP: Tex Pham MD        ================================================================    Reason for Admission: COPD exacerbation [facility]) [J44.1]    Discharge Diagnoses:   Acute on chronic COPD Exacerbation  Chronic DM  Chronic HTN    Important notes to PCP/ follow-up studies and evaluations   Tapering prednisone- May have increased insulin needs. Patient is aware of how to manage her SSI while on Prednisone. No changes to home inhaler/COPD medication regimen   Patient to complete 7 day course of Levaquin (ending ). Stable SpO2 on home O2 requirement  Patient will have hospital to home COPD evaluation. Pending labs and studies:  None    Operative Procedures:   None    Discharge Medications:     Discharge Medication List as of 2019  4:51 PM      START taking these medications    Details   levoFLOXacin (LEVAQUIN) 750 mg tablet Take 1 Tab by mouth every twenty-four (24) hours for 4 days. , Print, Disp-4 Tab, R-0         CONTINUE these medications which have CHANGED    Details   albuterol sulfate (PROVENTIL;VENTOLIN) 2.5 mg/0.5 mL nebu nebulizer solution 0.5 mL by Nebulization route four (4) times daily as needed for Wheezing. To be used with HyperSal nebulizer solution. ICD code: COPD J44.1, Print, Disp-60 mL, R-3      predniSONE (DELTASONE) 10 mg tablet Take 60 mg by mouth daily for 1 day, THEN 50 mg daily for 3 days, THEN 40 mg daily for 3 days, THEN 30 mg daily for 3 days, THEN 20 mg daily for 3 days, THEN 10 mg every other day for 10 days.  1 tablet with breakfast every other morning, Print, Disp-53 T ab, R-0         CONTINUE these medications which have NOT CHANGED    Details   cetirizine (ZYRTEC) 10 mg tablet Take  by mouth daily as needed for Allergies or Rhinitis., Historical Med      fluticasone propionate (FLONASE ALLERGY RELIEF) 50 mcg/actuation nasal spray 2 Sprays by Both Nostrils route daily. , Historical Med      fluticasone propion-salmeterol (ADVAIR HFA) 230-21 mcg/actuation inhaler Take 2 Puffs by inhalation two (2) times a day., Historical Med      hydroCHLOROthiazide (HYDRODIURIL) 12.5 mg tablet Take 12.5 mg by mouth daily. , Historical Med      tiotropium bromide (SPIRIVA RESPIMAT) 2.5 mcg/actuation inhaler Take 2 Puffs by inhalation daily. , Historical Med      insulin glargine (LANTUS,BASAGLAR) 100 unit/mL (3 mL) inpn 35 Units by SubCUTAneous route nightly., Normal, Disp-28 Units, R-0      albuterol sulfate 90 mcg/actuation aepb Take 1 Puff by inhalation every four (4) hours. , Print, Disp-1 Inhaler, R-0      NOVOLOG FLEXPEN U-100 INSULIN 100 unit/mL inpn Continue home Sliding scale insulin as prior to admission, Print, Disp-1 Pen, R-0, ERLINDA      omeprazole (PRILOSEC) 40 mg capsule Take 40 mg by mouth daily. Indications: gastroesophageal reflux disease, Historical Med      lisinopril (PRINIVIL, ZESTRIL) 40 mg tablet Take 20 mg by mouth two (2) times a day. Indications: high blood pressure, Historical Med      simethicone (MYLICON) 80 mg chewable tablet Take 80 mg by mouth every six (6) hours as needed for Flatulence., Historical Med      cpap machine kit by Does Not Apply route nightly. M.E.D., Historical Med      OXYGEN-AIR DELIVERY SYSTEMS 2 L by Nasal route continuous. First Choice, Historical Med      aspirin delayed-release 81 mg tablet Take 81 mg by mouth daily. , Historical Med      famotidine (PEPCID) 20 mg tablet Take 20 mg by mouth two (2) times daily as needed for Other (reflux). , Historical Med      montelukast (SINGULAIR) 10 mg tablet Take 10 mg by mouth nightly., Historical Med         STOP taking these medications       doxycycline (MONODOX) 100 mg capsule Comments:   Reason for Stopping:         roflumilast (DALIRESP) 250 mcg tab Comments:   Reason for Stopping:             Disposition: Home With hospital to home     Consultants:    None    Brief Hospital Course (including pertinent history and physical findings)  Pt was admitted for COPD exacerbation, tachypneic, increased WOB, hypoxic. Patient was afebrile and no white count throughout her admission. Vitals have been stable. COPD  Pt was found to be in COPD exacerbation, requiring increased O2, is on 2L O2 at home. She was started on duonebs and Levaquin. Her O2 saturations improved to baseline and her wheezing and SOB has improved. Patient discharged on home medications. Prescription for duonebs was given. Patient has nebulizer at home. Prednisone taper initiated. DM   She was continued on Lantus and CDI, her sugars remained 132-310 and was discharged on her home regimen. Patient is aware of the effects of Prednisone on her blood glucose and knows how to adjust her SSI regimen appropriately. Summarized key findings and results (labs, imaging studies, ECHO, cardiac cath, endoscopies, etc):    CXR (6/29)     The cardiomediastinal silhouette is within normal limits. The pulmonary  vascular markings are unremarkable. Similar haziness at the lung bases. No  pleural effusion or pneumothorax. Degenerative disc disease. . .     IMPRESSION:Similar likely mild atelectasis lung bases. .    Functional status and cognitive function:    Ambulates without assistance   Status: alert, cooperative, no distress, appears stated age     Diet:Diabetic: 2000 Calories    Code status and advanced care plan: Full  [address] [facility] [address] Daughter, [phone]        Patient Education:  Patient was educated on the following topics prior to discharge:  - COPD   - Diabetes management, sliding scale insulin       Follow-up:   Follow-up Information     Follow up With Specialties Details Why Contact Info    [facility] MEDICINE  Go on 7/9/2019 at 8:20 AM [address] [facility] [address]  [phone]    Sylvia Luis DO Pulmonary Disease, Critical Care Medicine Go on 7/16/2019 at 1:45 PM [address] [place] [address]  [address] [id number]  [phone]             Signed By: Viet Adhikari MD     July 2, 2019

## 2019-06-30 NOTE — PROGRESS NOTES
Problem: Self Care Deficits Care Plan (Adult)  Goal: *Acute Goals and Plan of Care (Insert Text)  Outcome: Resolved/Met     OCCUPATIONAL THERAPY EVALUATION/DISCHARGE    Patient: Alonso Xiao (11 y.o. female)  Date: 6/30/2019  Primary Diagnosis: COPD exacerbation ([facility] [address] [J44.1]  Precautions: Universal  PLOF: Patient was independent with self-care and used a RW for functional mobility prn PTA. Pt reports having an aide come to her home M-F to assist, but she is still able to care for herself with ADLs. ASSESSMENT AND RECOMMENDATIONS:  Pt cleared to participate in OT evaluation by RN. Upon entering the room, pt was semi-reclined in bed, alert, and agreeable to participate in OT evaluation. Pt was seen with PT to maximize pt safety and participation; O2 donned throughout session reading 94%. Pt educated on energy conservation techniques, pursed lip breathing, and self pacing during daily activities. Patient given energy conservation handout and verbalized understanding. Based on the objective data described below, the patient presents with no deficits that impede pt function with ADLs, functional transfers, and functional mobility. Patient is independent with basic self-care, modified independent with bed mobility and Supervision with functional transfers using rolling walker. At this time patient is safe to d/c home with family support. OT to d/c from caseload at this time. Skilled occupational therapy is not indicated at this time.   Discharge Recommendations: None  Further Equipment Recommendations for Discharge: N/A      SUBJECTIVE:   Patient stated nghia had COPD for 15 years so Im pretty much used to this and doing things at my own pace    OBJECTIVE DATA SUMMARY:     Past Medical History:   Diagnosis Date    Asthma     Chronic lung disease     COPD     Cystocele, midline     Diabetes mellitus ([facility] [address]     GERD (gastroesophageal reflux disease)     Hidradenitis suppurativa     Hyperlipidemia     Hypertension     SHERRIE on CPAP     CPAP    Stress incontinence      Past Surgical History:   Procedure Laterality Date    BREAST SURGERY PROCEDURE UNLISTED      Right breast benign tumor removal    HX APPENDECTOMY      HX CHOLECYSTECTOMY      HX DILATION AND CURETTAGE      Dysfunctional uterine bleeding, thought 2/2 fibroids    HX TUBAL LIGATION       Barriers to Learning/Limitations: None  Compensate with: visual, verbal, tactile, kinesthetic cues/model    Home Situation:   Home Situation  Home Environment: Private residence  # Steps to Enter: 4  Rails to Enter: Yes  Hand Rails : Right  One/Two Story Residence: Two story  # of Interior Steps: 15  Interior Rails: Right  Living Alone: No  Support Systems: Child(kim), Friends \ neighbors  Patient Expects to be Discharged to[de-identified] Private residence  Current DME Used/Available at Home: Cane, straight, CPAP, Glucometer, Oxygen, portable, Walker  Tub or Shower Type: Tub/Shower combination  ? Right hand dominant   ? Left hand dominant    Cognitive/Behavioral Status:  Neurologic State: Alert  Orientation Level: Oriented X4  Cognition: Follows commands; Appropriate decision making; Appropriate safety awareness  Safety/Judgement: Awareness of environment    Skin: Intact  Edema: None noted    Vision/Perceptual:    Acuity: Within Defined Limits;Able to read normal print without difficulty      Coordination: BUE  Fine Motor Skills-Upper: Left Intact; Right Intact    Gross Motor Skills-Upper: Left Intact; Right Intact    Balance:  Sitting: Intact  Standing: Intact; With support    Strength: BUE  Strength: Generally decreased, functional    Tone & Sensation: BUE  Tone: Normal  Sensation: Intact    Range of Motion: BUE  AROM: Within functional limits    Functional Mobility and Transfers for ADLs:  Bed Mobility:  Supine to Sit: Modified independent  Scooting: Modified independent    Transfers:  Sit to Stand: Supervision  Stand to Sit: Supervision    ADL Assessment:  Feeding: Independent    Oral Facial Hygiene/Grooming: Independent    Bathing: Modified independent; Additional time    Upper Body Dressing: Independent    Lower Body Dressing: Modified independent; Additional time    Toileting: Independent    ADL Intervention:  Lower Body Dressing Assistance  Dressing Assistance: Modified independent  Socks: Modified independent  Leg Crossed Method Used: Yes  Position Performed: Seated edge of bed    Cognitive Retraining  Safety/Judgement: Awareness of environment    Pain:  Pain level pre-treatment: 6/10, back and B sides  Pain level post-treatment: 6/10, back and B sides   Pain Intervention(s): Medication (see MAR); Response to intervention: See doc flow    Activity Tolerance:   Patient demonstrated fair activity tolerance during OT evaluation. Please refer to the flowsheet for vital signs taken during this treatment. After treatment:   ?  Patient left in no apparent distress sitting up in chair  ? Patient left in no apparent distress in bed  ? Call bell left within reach  ? Nursing notified  ? Caregiver present  ? Bed alarm activated    COMMUNICATION/EDUCATION:   ?      Role of Occupational Therapy in the acute care setting  ? Home safety education was provided and the patient/caregiver indicated understanding. ? Patient/family have participated as able and agree with findings and recommendations. ?      Patient is unable to participate in plan of care at this time. Thank you for this referral.  Nancy Pryor, OTR/L  Time Calculation: 28 mins      Eval Complexity: History: MEDIUM Complexity : Expanded review of history including physical, cognitive and psychosocial  history ; Examination: LOW Complexity : 1-3 performance deficits relating to physical, cognitive , or psychosocial skils that result in activity limitations and / or participation restrictions ;    Decision Making:MEDIUM Complexity : Patient may present with comorbidities that affect occupational performnce.  Miniml to moderate modification of tasks or assistance (eg, physical or verbal ) with assesment(s) is necessary to enable patient to complete evaluation

## 2019-06-30 NOTE — ROUTINE PROCESS
Received report from [address] [place] [address]. Pt AAOx3, NAD, breathing non labored, on O2 NC at 2L, HOB up. IV site clean, dry and intact. Bed at the lowest level on lock position, call bell w/i reach. Bedside and Verbal shift change report given to [id number] Vanessa (oncoming nurse) by me (offgoing nurse). Report included the following information SBAR, Kardex, Intake/Output, MAR and Recent Results.

## 2019-06-30 NOTE — PROGRESS NOTES
Problem: Mobility Impaired (Adult and Pediatric)  Goal: *Acute Goals and Plan of Care (Insert Text)  Description  Physical Therapy Goals  Initiated 6/30/2019 and to be accomplished within 7 day(s)  1. Patient will move from supine to sit and sit to supine  in bed with independence. 2.  Patient will transfer from bed to chair and chair to bed with independence using the least restrictive device. 3.  Patient will perform sit to stand with independence. 4.  Patient will ambulate with modified independence for 50 feet with the least restrictive device. 5.  Patient will ascend/descend 4 stairs with 1 handrail(s) with modified independence. 6/30/2019 1134 by Spaphire Morrow, PT  Outcome: Progressing Towards Goal    PHYSICAL THERAPY EVALUATION    Patient: David Laughlin (43 y.o. female)  Date: 6/30/2019  Primary Diagnosis: COPD exacerbation ([facility] [address] [J44.1]        Precautions: fall risk, decreased activity tolerance     PLOF: Ind with ambulation at home, uses RW for community ambulation, aid comes Monday-Friday    ASSESSMENT :  Based on the objective data described below, the patient presents in the process of sitting up to side of bed. She was agreeable to participate in PT today. She is on 2L of oxygen and this is what she uses at home. She was supervision assist with sit to stand transfer and with ambulation. She ambulated 8 feet with RW with good stability and shuffle gait pattern. She had SOB with ambulation but SPO2% remained about 95%. She ambulated another 8 feet after a rest break and was again SOB. She was left in sitting with OT to review energy conservation techniques. Patient will benefit from skilled intervention to address the above impairments. Patient's rehabilitation potential is considered to be Good  Factors which may influence rehabilitation potential include:   ? None noted  ? Mental ability/status  ? Medical condition  ?          Home/family situation and support systems  ? Safety awareness  ? Pain tolerance/management  ? Other:      PLAN :  Recommendations and Planned Interventions:   ?           Bed Mobility Training             ? Neuromuscular Re-Education  ? Transfer Training                   ? Orthotic/Prosthetic Training  ? Gait Training                          ? Modalities  ? Therapeutic Exercises           ? Edema Management/Control  ? Therapeutic Activities            ? Family Training/Education  ? Patient Education  ? Other (comment):    Frequency/Duration: Patient will be followed by physical therapy 1-2 times per day/4-7 days per week to address goals. Discharge Recommendations: Home Health  Further Equipment Recommendations for Discharge: N/A     SUBJECTIVE:   Patient stated ? I'm doing ok today. The pain on my side is normally there?     OBJECTIVE DATA SUMMARY:     Past Medical History:   Diagnosis Date    Asthma     Chronic lung disease     COPD     Cystocele, midline     Diabetes mellitus ([facility] [address]     GERD (gastroesophageal reflux disease)     Hidradenitis suppurativa     Hyperlipidemia     Hypertension     SHERRIE on CPAP     CPAP    Stress incontinence      Past Surgical History:   Procedure Laterality Date    BREAST SURGERY PROCEDURE UNLISTED      Right breast benign tumor removal    HX APPENDECTOMY      HX CHOLECYSTECTOMY      HX DILATION AND CURETTAGE      Dysfunctional uterine bleeding, thought 2/2 fibroids    HX TUBAL LIGATION       Barriers to Learning/Limitations: None  Compensate with: N/A  Home Situation:  Home Situation  Home Environment: Private residence  # Steps to Enter: 4  Rails to Enter: Yes  Hand Rails : Right  One/Two Story Residence: Two story  # of Interior Steps: 15  Interior Rails: Right  Living Alone: No  Support Systems: Child(kim), Friends \ neighbors  Patient Expects to be Discharged to[de-identified] Private residence  Current DME Used/Available at Home: Cane, straight, CPAP, Glucometer, Oxygen, portable, Walker  Critical Behavior:  Neurologic State: Alert  Orientation Level: Oriented X4  Cognition: Appropriate for age attention/concentration     Psychosocial  Purposeful Interaction: Yes  Pt Identified Daily Priority: Clinical issues (comment)  [place] Process: Nurture loving kindness; Teaching/learning; Attend basic human needs  Caring Interventions: Reassure  Reassure: Therapeutic listening; Acceptance; Informing  Therapeutic Modalities: Humor; Intentional therapeutic touch       Strength:    Strength: Generally decreased, functional    Range Of Motion:  AROM: Within functional limits        Functional Mobility:  Bed Mobility:     Supine to Sit: Modified independent        Transfers:  Sit to Stand: Supervision  Stand to Sit: Supervision    Balance:   Sitting: Intact  Standing: With support  Ambulation/Gait Training:  Distance (ft): 8 Feet (ft)(twice)  Assistive Device: Walker, rolling  Ambulation - Level of Assistance: Stand-by assistance     Gait Description (WDL): Exceptions to WDL  Gait Abnormalities: Shuffling gait    Stance: Left decreased;Right decreased     Step Length: Left shortened;Right shortened  Pain:  Pain level pre-treatment: 6/10   Pain level post-treatment: 6/10     Activity Tolerance:   Poor ambulation tolerance secondary to SOB. Please refer to the flowsheet for vital signs taken during this treatment. After treatment:   ?         Patient left in no apparent distress sitting up in chair  ? Patient left in no apparent distress in bed  ? Call bell left within reach  ? Nursing notified  ? Caregiver present  ? Bed alarm activated  ? SCDs applied    COMMUNICATION/EDUCATION:   ?         Role of Physical Therapy in the acute care setting. ?         Fall prevention education was provided and the patient/caregiver indicated understanding. ?          Patient/family have participated as able in goal setting and plan of care. ?         Patient/family agree to work toward stated goals and plan of care. ?         Patient understands intent and goals of therapy, but is neutral about his/her participation. ? Patient is unable to participate in goal setting/plan of care: ongoing with therapy staff. ?         Other:     Thank you for this referral.  Liyah Monroy, PT   Time Calculation: 16 mins      Eval Complexity: History: MEDIUM  Complexity : 1-2 comorbidities / personal factors will impact the outcome/ POC Exam:LOW Complexity : 1-2 Standardized tests and measures addressing body structure, function, activity limitation and / or participation in recreation  Presentation: LOW Complexity : Stable, uncomplicated  Clinical Decision Making:Low Complexity    Overall Complexity:LOW

## 2019-06-30 NOTE — PROGRESS NOTES
Bedside and Verbal shift change report given to Silvio Lorenzo RN (oncoming nurse) by Brielle Campa RN (offgoing nurse). Report included the following information SBAR, Kardex, Intake/Output and Recent Results.

## 2019-06-30 NOTE — PROGRESS NOTES
Progress Note  [facility]       Patient: Madison Jensen MRN: [id number]  CSN: [id number]    YOB: 1960  Age: 60 y.o. Sex: female    DOA: 6/29/2019 LOS:  LOS: 1 day                    Subjective:     Acute events: No acute events. Afebrile, hemodynamically. No issues or concerns overnight or this AM.    Patient sleeping comfortably this AM with CPAP machine on and easily aroused to name. Patient reports that she is breathing much better but not quite at baseline. Patient reports that her coughing and sputum production has decreased. Patient denied any other issues or concerns. Review of Systems   Constitutional: Negative for chills and fever. Eyes: Negative for blurred vision and double vision. Respiratory: Negative for cough, sputum production, shortness of breath and wheezing. Cardiovascular: Negative for chest pain, palpitations and leg swelling. Gastrointestinal: Negative for abdominal pain, heartburn, nausea and vomiting. Genitourinary: Negative for dysuria, flank pain, frequency, hematuria and urgency. Musculoskeletal: Negative for back pain, joint pain and neck pain. Neurological: Negative for dizziness, tingling, weakness and headaches. Psychiatric/Behavioral: Negative for depression. The patient is not nervous/anxious.         Objective:      Patient Vitals for the past 24 hrs:   Temp Pulse Resp BP SpO2   06/30/19 0813 -- -- -- -- 90 %   06/30/19 0739 97.4 °F (36.3 °C) 71 20 110/52 99 %   06/30/19 0449 -- -- -- -- 98 %   06/30/19 0400 97.4 °F (36.3 °C) 69 20 128/59 99 %   06/30/19 0032 -- -- -- -- 96 %   06/30/19 0000 97.3 °F (36.3 °C) 88 18 130/72 95 %   06/29/19 2034 -- -- -- -- 93 %   06/29/19 2000 97.8 °F (36.6 °C) 87 20 126/70 96 %   06/29/19 1800 97.5 °F (36.4 °C) 90 18 144/87 95 %   06/29/19 1708 -- 86 16 -- 96 %   06/29/19 1630 -- 88 18 110/65 98 %   06/29/19 1615 -- 86 23 109/50 96 %   06/29/19 1600 -- 86 24 120/54 95 %   06/29/19 1545 -- 86 23 118/56 96 %   06/29/19 1530 -- 88 23 117/55 96 %   06/29/19 1515 -- 84 16 136/54 97 %   06/29/19 1500 -- 85 27 130/53 96 %   06/29/19 1445 -- 79 23 118/53 99 %   06/29/19 1430 -- 82 21 137/70 97 %   06/29/19 1415 -- 76 24 145/65 97 %   06/29/19 1400 -- 85 21 134/70 97 %   06/29/19 1345 -- 79 23 127/56 96 %   06/29/19 1330 -- 78 24 135/64 97 %   06/29/19 1315 -- 84 25 130/63 97 %   06/29/19 1300 -- 77 20 131/64 97 %   06/29/19 1245 -- 84 24 140/73 96 %   06/29/19 1145 -- 85 24 129/60 95 %   06/29/19 1130 -- 88 25 160/63 96 %   06/29/19 1110 98 °F (36.7 °C) 96 25 152/66 97 %         Intake/Output Summary (Last 24 hours) at 6/30/2019 0837  Last data filed at 6/30/2019 0108  Gross per 24 hour   Intake 240 ml   Output 900 ml   Net -660 ml       Physical Exam   Constitutional: She is oriented to person, place, and time. She appears well-developed and well-nourished. No distress. HENT:   Head: Normocephalic and atraumatic. Mouth/Throat: Oropharynx is clear and moist.   Eyes: Pupils are equal, round, and reactive to light. Conjunctivae and EOM are normal.   Neck: Normal range of motion. Neck supple. No tracheal deviation present. No thyromegaly present. Cardiovascular: Normal rate, regular rhythm, normal heart sounds and intact distal pulses. Exam reveals no gallop and no friction rub. No murmur heard. Pulmonary/Chest: Effort normal. No stridor. No respiratory distress. She has wheezes. She has no rales. Abdominal: Soft. Bowel sounds are normal. She exhibits no distension. There is no tenderness. Musculoskeletal: Normal range of motion. She exhibits no edema, tenderness or deformity. Neurological: She is alert and oriented to person, place, and time. No cranial nerve deficit. Skin: Skin is warm and dry. She is not diaphoretic. No erythema. No pallor.        Lab/Data Reviewed:  Recent Results (from the past 24 hour(s))   CBC WITH AUTOMATED DIFF    Collection Time: 06/29/19 11:32 AM   Result Value Ref Range    WBC 6.2 4.6 - 13.2 K/uL    RBC 4.42 4.20 - 5.30 M/uL    HGB 13.6 12.0 - 16.0 g/dL    HCT 38.3 35.0 - 45.0 %    MCV 86.7 74.0 - 97.0 FL    MCH 30.8 24.0 - 34.0 PG    MCHC 35.5 31.0 - 37.0 g/dL    RDW 13.6 11.6 - 14.5 %    PLATELET 951 669 - 563 K/uL    MPV 8.9 (L) 9.2 - 11.8 FL    NEUTROPHILS 57 40 - 73 %    LYMPHOCYTES 32 21 - 52 %    MONOCYTES 5 3 - 10 %    EOSINOPHILS 6 (H) 0 - 5 %    BASOPHILS 0 0 - 2 %    ABS. NEUTROPHILS 3.6 1.8 - 8.0 K/UL    ABS. LYMPHOCYTES 2.0 0.9 - 3.6 K/UL    ABS. MONOCYTES 0.3 0.05 - 1.2 K/UL    ABS. EOSINOPHILS 0.3 0.0 - 0.4 K/UL    ABS. BASOPHILS 0.0 0.0 - 0.1 K/UL    DF AUTOMATED     METABOLIC PANEL, COMPREHENSIVE    Collection Time: 06/29/19 11:32 AM   Result Value Ref Range    Sodium 142 136 - 145 mmol/L    Potassium 4.0 3.5 - 5.5 mmol/L    Chloride 106 100 - 108 mmol/L    CO2 30 21 - 32 mmol/L    Anion gap 6 3.0 - 18 mmol/L    Glucose 213 (H) 74 - 99 mg/dL    BUN 10 7.0 - 18 MG/DL    Creatinine 0.90 0.6 - 1.3 MG/DL    BUN/Creatinine ratio 11 (L) 12 - 20      GFR est AA >60 >60 ml/min/1.73m2    GFR est non-AA >60 >60 ml/min/1.73m2    Calcium 9.5 8.5 - 10.1 MG/DL    Bilirubin, total 0.8 0.2 - 1.0 MG/DL    ALT (SGPT) 45 13 - 56 U/L    AST (SGOT) 24 15 - 37 U/L    Alk.  phosphatase 93 45 - 117 U/L    Protein, total 7.5 6.4 - 8.2 g/dL    Albumin 3.7 3.4 - 5.0 g/dL    Globulin 3.8 2.0 - 4.0 g/dL    A-G Ratio 1.0 0.8 - 1.7     EKG, 12 LEAD, INITIAL    Collection Time: 06/29/19 12:36 PM   Result Value Ref Range    Ventricular Rate 82 BPM    Atrial Rate 82 BPM    P-R Interval 144 ms    QRS Duration 86 ms    Q-T Interval 394 ms    QTC Calculation (Bezet) 460 ms    Calculated P Axis 43 degrees    Calculated R Axis 23 degrees    Calculated T Axis 47 degrees    Diagnosis       Normal sinus rhythm  Possible Inferior infarct , age undetermined  Cannot rule out Anterior infarct , age undetermined  Abnormal ECG  When compared with ECG of 12-JUN-2019 22:49,  No significant change was found POC G3    Collection Time: 06/29/19  2:31 PM   Result Value Ref Range    Device: NASAL CANNULA      Flow rate (POC) 2 L/M    FIO2 (POC) 28 %    pH (POC) 7.370 7.35 - 7.45      pCO2 (POC) 48.6 (H) 35.0 - 45.0 MMHG    pO2 (POC) 81 80 - 100 MMHG    HCO3 (POC) 28.1 (H) 22 - 26 MMOL/L    sO2 (POC) 95 92 - 97 %    Base excess (POC) 2 mmol/L    Allens test (POC) YES      Total resp. rate 23      Site RIGHT RADIAL      Patient temp. 36.7      Specimen type (POC) ARTERIAL      Performed by Yoshi Storm, POC    Collection Time: 06/29/19  8:53 PM   Result Value Ref Range    Glucose (POC) 358 (H) 70 - 271 mg/dL   METABOLIC PANEL, BASIC    Collection Time: 06/30/19  3:13 AM   Result Value Ref Range    Sodium 139 136 - 145 mmol/L    Potassium 3.7 3.5 - 5.5 mmol/L    Chloride 103 100 - 108 mmol/L    CO2 31 21 - 32 mmol/L    Anion gap 5 3.0 - 18 mmol/L    Glucose 163 (H) 74 - 99 mg/dL    BUN 10 7.0 - 18 MG/DL    Creatinine 0.81 0.6 - 1.3 MG/DL    BUN/Creatinine ratio 12 12 - 20      GFR est AA >60 >60 ml/min/1.73m2    GFR est non-AA >60 >60 ml/min/1.73m2    Calcium 9.7 8.5 - 10.1 MG/DL   MAGNESIUM    Collection Time: 06/30/19  3:13 AM   Result Value Ref Range    Magnesium 2.1 1.6 - 2.6 mg/dL   CBC WITH AUTOMATED DIFF    Collection Time: 06/30/19  3:13 AM   Result Value Ref Range    WBC 7.6 4.6 - 13.2 K/uL    RBC 4.32 4.20 - 5.30 M/uL    HGB 13.0 12.0 - 16.0 g/dL    HCT 37.6 35.0 - 45.0 %    MCV 87.0 74.0 - 97.0 FL    MCH 30.1 24.0 - 34.0 PG    MCHC 34.6 31.0 - 37.0 g/dL    RDW 13.5 11.6 - 14.5 %    PLATELET 755 941 - 338 K/uL    MPV 9.0 (L) 9.2 - 11.8 FL    NEUTROPHILS 78 (H) 40 - 73 %    LYMPHOCYTES 16 (L) 21 - 52 %    MONOCYTES 6 3 - 10 %    EOSINOPHILS 0 0 - 5 %    BASOPHILS 0 0 - 2 %    ABS. NEUTROPHILS 5.9 1.8 - 8.0 K/UL    ABS. LYMPHOCYTES 1.2 0.9 - 3.6 K/UL    ABS. MONOCYTES 0.5 0.05 - 1.2 K/UL    ABS. EOSINOPHILS 0.0 0.0 - 0.4 K/UL    ABS.  BASOPHILS 0.0 0.0 - 0.1 K/UL    DF AUTOMATED     HEMOGLOBIN A1C WITH EAG Collection Time: 06/30/19  3:13 AM   Result Value Ref Range    Hemoglobin A1c 8.1 (H) 4.2 - 5.6 %    Est. average glucose 186 mg/dL   GLUCOSE, POC    Collection Time: 06/30/19  7:45 AM   Result Value Ref Range    Glucose (POC) 189 (H) 70 - 110 mg/dL       Scheduled Medications Reviewed:  Current Facility-Administered Medications   Medication Dose Route Frequency    enoxaparin (LOVENOX) injection 40 mg  40 mg SubCUTAneous Q24H    insulin lispro (HUMALOG) injection   SubCUTAneous AC&HS    aspirin delayed-release tablet 81 mg  81 mg Oral DAILY    fluticasone propionate (FLONASE) 50 mcg/actuation nasal spray 2 Spray  2 Spray Both Nostrils DAILY    [Held by provider] hydroCHLOROthiazide (HYDRODIURIL) tablet 12.5 mg  12.5 mg Oral DAILY    insulin glargine (LANTUS) injection 30 Units  30 Units SubCUTAneous QHS    [Held by provider] lisinopril (PRINIVIL, ZESTRIL) tablet 20 mg  20 mg Oral BID    montelukast (SINGULAIR) tablet 10 mg  10 mg Oral QHS    pantoprazole (PROTONIX) tablet 40 mg  40 mg Oral ACB    predniSONE (DELTASONE) tablet 60 mg  60 mg Oral DAILY WITH BREAKFAST    albuterol-ipratropium (DUO-NEB) 2.5 MG-0.5 MG/3 ML  3 mL Nebulization Q4H RT       Imaging, microbiology, and EKG/Telemetry:  Xr Chest Pa Lat    Result Date: 6/29/2019  IMPRESSION: Similar likely mild atelectasis lung bases. .      Assessment/Plan   60 y.o. female with PMH of COPD on home O2, IDDM2, HTN, HFpEF, SHERRIE on CPAP, GERD, and allergic rhinitis, now admitted with acute COPD exacerbation.     Acute COPD Exacerbation w/Chronic Hypoxic Respiratory Failure: H/o moderate COPD, GOLD 2. On home supplemental O2 @ 2 LPM at home. Respiratory Cx (6/25/19) showed many normal respiratory samina with moderate GPC, few GNR, and moderate gram(-) diplococci. Prescribed Doxycylcine by primary pulmonologist, Dr. Ana Maria Sheppard, without any improvement in sputum production or increased work of breathing.  Patient doing much better this AM with decreased cough and sputum production. - Will downgrade to telemetry if not requiring increased O2 in AM  - F/u Sputum Cx  - Duo-nebs q4h per RT  - Continue Prednisone 60 mg daily for 5 days; will plan to taper to home Prednisone 10 mg QOD  - Continue IV Levaquin 750 mg q24h, will transition to PO Levaquin for COPD Exacerbation as status improves  - Continue home supplemental O2 @ 2 LPM, may titrate supplemental O2 to maintain SpO2 between 88-92%, wean appropriately  - Continue home Singluar 10 mg QHS  - Hold home Advair 2 puff BID  - Hold home Spiriva 1.25 mcg/act 2 puffs daily  - Daily CBC, BMP, Mag  - Monitor VS per unit routine  - Pulse oximetry per unit routine  - F/u PT/OT/CM recs     IDDM2: Admission Hgb A1c: 8.1. Cannot tolerate Metformin (nausea, diarrhea). - Continue home Lantus 30 units QHS  - SSI + Accuchecks ACHS  - Hypoglycemia protocol  - Diabetic Low-Salt Diet     HTN, HFpEF: Admission BP ranging 100s-150s/50-70s. Last ECHO (7/2018): EF 66-70%; left ventricular mild concentric hypertrophy.   - Continue home ASA 81 mg daily  - Continue home Lisinopril 40 mg daily, will hold for now d/t soft BP  - Continue home HCTZ 12.5 mg daily, will hold for now d/t soft BP  - Monitor VS per unit routine  - Monitor I/O's per unit routine  - Daily BMP, Mag     GERD  - Hold home Omeprazole 40 mg daily, not on hospital formulary  - Start PO Protonix 40 mg daily  - Continue home Famotidine 20 mg BID PRN  - Continue home Simethicone 80 mg QID PRN  - Reflux precautions     Allergic Rhinitis  - Continue home Flonase 2 sprays daily  - Hold home Cetirizine 10 mg nightly PRN        Diet: Diabetic Low-Salt  DVT Prophylaxis: Lovenox  Code Status: FULL  Point of Contact: Mercedez Levin, Daughter, [phone]     Disposition and anticipated LOS: >2 midnights      Claudio Guzmán MD, PGY-2  [facility]

## 2019-06-30 NOTE — ROUTINE PROCESS
1930-Bedside and verbal report from Leo Alanis RN. Pt resting in bed, NAD, no SOB noted at rest. Pt reports SOB one exertion, wheezing, and green phlegm. Pt reports using CPAP at home for SHERRIE. Did not bring hers from home. Breathing treatments scheduled. Steady Gait observed. SOB on exertion noted. Hat in [address]. Explained to pt we record output and not to dump the hat. Pt understands. Required docs completed. 2030-Pt resting, NAD, NSR. No apparent pain, no SOB noted at rest. CPAP at bedside set up by RT. Call bell within reach. 2130-Pt resting, NAD, NSR. No apparent pain,no SOB noted. CPAP at bedside. Pt given snack with evening insulin. 2230-Pt resting, NAD, NSR, no SOB or pain reported. Call bell within reach. 2330-Pt resting, NSR, NAD, no SOB or pain reported by pt CPAP on. Call bell within reach. 0130-Pt resting, NSR, NAD, no c/o pain, no SOB, CPAP on. Call bell within reach. 0330-Pt resitng, NAD, NSR, no apparent pain, no SOB, CPAP on, call bell within reach. 0530-Pt resitng, NAD, NSR, no apparent pain, no SOB, CPAP on, call bell within reach.      0700-Bedside and verbal report given to Remberto Jara RN

## 2019-07-01 LAB
ANION GAP SERPL CALC-SCNC: 5 MMOL/L (ref 3–18)
BASOPHILS # BLD: 0 K/UL (ref 0–0.1)
BASOPHILS NFR BLD: 0 % (ref 0–2)
BUN SERPL-MCNC: 13 MG/DL (ref 7–18)
BUN/CREAT SERPL: 13 (ref 12–20)
CALCIUM SERPL-MCNC: 9.3 MG/DL (ref 8.5–10.1)
CHLORIDE SERPL-SCNC: 102 MMOL/L (ref 100–108)
CO2 SERPL-SCNC: 32 MMOL/L (ref 21–32)
CREAT SERPL-MCNC: 0.97 MG/DL (ref 0.6–1.3)
DIFFERENTIAL METHOD BLD: ABNORMAL
EOSINOPHIL # BLD: 0 K/UL (ref 0–0.4)
EOSINOPHIL NFR BLD: 0 % (ref 0–5)
ERYTHROCYTE [DISTWIDTH] IN BLOOD BY AUTOMATED COUNT: 13.9 % (ref 11.6–14.5)
GLUCOSE BLD STRIP.AUTO-MCNC: 134 MG/DL (ref 70–110)
GLUCOSE BLD STRIP.AUTO-MCNC: 236 MG/DL (ref 70–110)
GLUCOSE BLD STRIP.AUTO-MCNC: 272 MG/DL (ref 70–110)
GLUCOSE BLD STRIP.AUTO-MCNC: 280 MG/DL (ref 70–110)
GLUCOSE SERPL-MCNC: 202 MG/DL (ref 74–99)
HCT VFR BLD AUTO: 35.4 % (ref 35–45)
HGB BLD-MCNC: 12.1 G/DL (ref 12–16)
LYMPHOCYTES # BLD: 2.1 K/UL (ref 0.9–3.6)
LYMPHOCYTES NFR BLD: 29 % (ref 21–52)
MAGNESIUM SERPL-MCNC: 2 MG/DL (ref 1.6–2.6)
MCH RBC QN AUTO: 30.1 PG (ref 24–34)
MCHC RBC AUTO-ENTMCNC: 34.2 G/DL (ref 31–37)
MCV RBC AUTO: 88.1 FL (ref 74–97)
MONOCYTES # BLD: 0.5 K/UL (ref 0.05–1.2)
MONOCYTES NFR BLD: 7 % (ref 3–10)
NEUTS SEG # BLD: 4.7 K/UL (ref 1.8–8)
NEUTS SEG NFR BLD: 64 % (ref 40–73)
PLATELET # BLD AUTO: 253 K/UL (ref 135–420)
PMV BLD AUTO: 8.9 FL (ref 9.2–11.8)
POTASSIUM SERPL-SCNC: 3.4 MMOL/L (ref 3.5–5.5)
POTASSIUM SERPL-SCNC: 4.6 MMOL/L (ref 3.5–5.5)
RBC # BLD AUTO: 4.02 M/UL (ref 4.2–5.3)
SODIUM SERPL-SCNC: 139 MMOL/L (ref 136–145)
WBC # BLD AUTO: 7.3 K/UL (ref 4.6–13.2)

## 2019-07-01 PROCEDURE — 97116 GAIT TRAINING THERAPY: CPT

## 2019-07-01 PROCEDURE — 94640 AIRWAY INHALATION TREATMENT: CPT

## 2019-07-01 PROCEDURE — 83735 ASSAY OF MAGNESIUM: CPT

## 2019-07-01 PROCEDURE — 74011250636 HC RX REV CODE- 250/636: Performed by: STUDENT IN AN ORGANIZED HEALTH CARE EDUCATION/TRAINING PROGRAM

## 2019-07-01 PROCEDURE — 74011636637 HC RX REV CODE- 636/637: Performed by: FAMILY MEDICINE

## 2019-07-01 PROCEDURE — 74011250637 HC RX REV CODE- 250/637: Performed by: FAMILY MEDICINE

## 2019-07-01 PROCEDURE — 82962 GLUCOSE BLOOD TEST: CPT

## 2019-07-01 PROCEDURE — 74011250637 HC RX REV CODE- 250/637: Performed by: STUDENT IN AN ORGANIZED HEALTH CARE EDUCATION/TRAINING PROGRAM

## 2019-07-01 PROCEDURE — 94660 CPAP INITIATION&MGMT: CPT

## 2019-07-01 PROCEDURE — 36415 COLL VENOUS BLD VENIPUNCTURE: CPT

## 2019-07-01 PROCEDURE — 65270000029 HC RM PRIVATE

## 2019-07-01 PROCEDURE — 74011636637 HC RX REV CODE- 636/637: Performed by: STUDENT IN AN ORGANIZED HEALTH CARE EDUCATION/TRAINING PROGRAM

## 2019-07-01 PROCEDURE — 74011000250 HC RX REV CODE- 250: Performed by: STUDENT IN AN ORGANIZED HEALTH CARE EDUCATION/TRAINING PROGRAM

## 2019-07-01 PROCEDURE — 77010033678 HC OXYGEN DAILY

## 2019-07-01 PROCEDURE — 84132 ASSAY OF SERUM POTASSIUM: CPT

## 2019-07-01 PROCEDURE — 85025 COMPLETE CBC W/AUTO DIFF WBC: CPT

## 2019-07-01 RX ORDER — LEVOFLOXACIN 750 MG/1
750 TABLET ORAL EVERY 24 HOURS
Status: DISCONTINUED | OUTPATIENT
Start: 2019-07-01 | End: 2019-07-02 | Stop reason: HOSPADM

## 2019-07-01 RX ORDER — POTASSIUM CHLORIDE 20 MEQ/1
20 TABLET, EXTENDED RELEASE ORAL
Status: COMPLETED | OUTPATIENT
Start: 2019-07-01 | End: 2019-07-01

## 2019-07-01 RX ADMIN — INSULIN LISPRO 9 UNITS: 100 INJECTION, SOLUTION INTRAVENOUS; SUBCUTANEOUS at 18:38

## 2019-07-01 RX ADMIN — ENOXAPARIN SODIUM 40 MG: 40 INJECTION SUBCUTANEOUS at 18:39

## 2019-07-01 RX ADMIN — POTASSIUM CHLORIDE 20 MEQ: 20 TABLET, EXTENDED RELEASE ORAL at 08:08

## 2019-07-01 RX ADMIN — MONTELUKAST 10 MG: 10 TABLET, FILM COATED ORAL at 21:26

## 2019-07-01 RX ADMIN — IPRATROPIUM BROMIDE AND ALBUTEROL SULFATE 3 ML: .5; 3 SOLUTION RESPIRATORY (INHALATION) at 15:27

## 2019-07-01 RX ADMIN — ASPIRIN 81 MG: 81 TABLET, COATED ORAL at 08:09

## 2019-07-01 RX ADMIN — PREDNISONE 60 MG: 20 TABLET ORAL at 08:08

## 2019-07-01 RX ADMIN — POTASSIUM CHLORIDE 20 MEQ: 20 TABLET, EXTENDED RELEASE ORAL at 11:31

## 2019-07-01 RX ADMIN — IPRATROPIUM BROMIDE AND ALBUTEROL SULFATE 3 ML: .5; 3 SOLUTION RESPIRATORY (INHALATION) at 03:54

## 2019-07-01 RX ADMIN — IPRATROPIUM BROMIDE AND ALBUTEROL SULFATE 3 ML: .5; 3 SOLUTION RESPIRATORY (INHALATION) at 13:17

## 2019-07-01 RX ADMIN — IPRATROPIUM BROMIDE AND ALBUTEROL SULFATE 3 ML: .5; 3 SOLUTION RESPIRATORY (INHALATION) at 20:35

## 2019-07-01 RX ADMIN — INSULIN LISPRO 6 UNITS: 100 INJECTION, SOLUTION INTRAVENOUS; SUBCUTANEOUS at 21:27

## 2019-07-01 RX ADMIN — INSULIN LISPRO 9 UNITS: 100 INJECTION, SOLUTION INTRAVENOUS; SUBCUTANEOUS at 13:27

## 2019-07-01 RX ADMIN — IPRATROPIUM BROMIDE AND ALBUTEROL SULFATE 3 ML: .5; 3 SOLUTION RESPIRATORY (INHALATION) at 08:34

## 2019-07-01 RX ADMIN — POTASSIUM CHLORIDE 20 MEQ: 20 TABLET, EXTENDED RELEASE ORAL at 08:09

## 2019-07-01 RX ADMIN — PANTOPRAZOLE SODIUM 40 MG: 40 TABLET, DELAYED RELEASE ORAL at 08:09

## 2019-07-01 RX ADMIN — FLUTICASONE PROPIONATE 2 SPRAY: 50 SPRAY, METERED NASAL at 11:34

## 2019-07-01 RX ADMIN — LEVOFLOXACIN 750 MG: 750 TABLET, FILM COATED ORAL at 18:41

## 2019-07-01 RX ADMIN — INSULIN GLARGINE 30 UNITS: 100 INJECTION, SOLUTION SUBCUTANEOUS at 21:26

## 2019-07-01 NOTE — DIABETES MGMT
Diabetes Patient/Family Education Record  Factors That  May Influence Patients Ability  to Learn or  Comply with Recommendations   []   Language barrier    []   Cultural needs   []   Motivation    []   Cognitive limitation    []   Physical   []   Education    []   Physiological factors   []   Hearing/vision/speaking impairment   []   Islam beliefs    []   Financial factors   []  Other:   [x]  No factors identified at this time.      Person Instructed:   [x]   Patient   []   Family   []  Other     Preference for Learning:   [x]   Verbal   [x]   Written   []  Demonstration     Level of Comprehension & Competence:    []  Good                                      [x] Fair                                     []  Poor                             []  Needs Reinforcement   [x]  Teachback completed    Education Component:   [x]  Medication management, including how to administer insulin (if appropriate) and potential medication interactions    [x]  Nutritional management    []  Exercise   [x]  Signs, symptoms, and treatment of hyperglycemia and hypoglycemia   [x] Prevention, recognition and treatment of hyperglycemia and hypoglycemia   [x]  Importance of blood glucose monitoring and how to obtain a blood glucose meter    []  Instruction on use of the blood glucose meter   [x]  Discuss the importance of HbA1C monitoring    []  Sick day guidelines   []  Proper use and disposal of lancets, needles, syringes or insulin pens (if appropriate)   []  Potential long-term complications (retinopathy, kidney disease, neuropathy, foot care)   [] Information about whom to contact in case of emergency or for more information    [x]  Goal:  Patient/family will demonstrate understanding of Diabetes Self Management Skills by: 7/08/19  Plan for post-discharge education or self-management support:    [x] Outpatient class schedule provided            [] Patient Declined    [] Scheduled for outpatient classes (date) _______  Verify:  Does patient understand how diabetes medications work?  yes  Does patient know what their most recent A1c is? reviewed  Does patient monitor glucose at home? yes  Does patient have difficulty obtaining diabetes medications or testing supplies? no       Tio Currie, [address], [address] [place] [address]   Ext [phone]

## 2019-07-01 NOTE — ROUTINE PROCESS
Bedside and Verbal shift change report given to Kimberly Weller (oncoming nurse) by Nakita Ny (offgoing nurse). Report included the following information SBAR, Kardex, Intake/Output, MAR, Recent Results and Quality Measures.

## 2019-07-01 NOTE — NURSE NAVIGATOR
Transitional Care Team: Initial HUG Note    Date of Assessment: 07/01/19  Time of Assessment:  11:45 AM    William Washington is a 60 y.[facility]. [facility] Assessment & Plan   -Social: she lives with her youngest daughter, and her two grandchildren. She has 4 adult children locally. Her family provides transportation. She needs help with her ADL/IADLs; she has an aide 5 days per week for 6 hours per day; she thinks that the personal care agency is called \"[facility]. \" She has a RW, BSC, glucometer, oxygen (uses 2 L), nebulizer, and CPAP. She has an [employer]  that helps her as needed. -COPD educational flyer was provided; discussed with patient  -Cardiac/DM diets- she reports that she tries to follow diets and eat healthy; A1C 8.1%, elevated BG readings- she's on prednisone  -former smoker; stopped in 2007  -she received her influenza vaccine last year; she's up to date with PNA vaccine  -she states that Dr. Severo Brock is trying to get Trelegy authorized for her     Primary Diagnosis: Acute COPD Exacerbation w/Chronic Hypoxic Respiratory Failure    Advance Directive:  not on file; education provided. The patient states \"I want everything done. If it gets to the point where I can't decide. My children will decide. They all know what I want. \"      Current Code Status:  Full Code    Referral to Hospice/ Palliative Care Appropriate: no.    Awareness of Medical Conditions: (Trajectory of illness and pts expectations). She's aware     Discharge Needs: (to include safety issues) P.T. recommends home health, but the patient refuses home health because she will not agree to be homebound. Barriers Identified: None at this time    Patient is willing to go to SNF/Inpatient Rehab if recommended: no! Medication Review:  was not performed, awaiting final med list on AVS.    Can patient afford medications:  yes.     Patient is Compliant with Medication regimen: yes    Who manages medications at home: self; aide reminds her to take her medications    Best Patient Contact Number: [phone]       HUG (Healthy Understanding of Goals) program introduced to patient/family. The Transitional Care Team bridges the gaps in care and education surrounding discharge from the acute care facility. The objective is to empower the patient and family in taking a proactive role in preventing readmission within the first thirty days after discharge. The team is also involved in the efforts to reduce readmission to the acute care setting after stabilization and discharge from the acute care environment either to skilled nursing facilities or community. HUG RN/CNS will return with [facility] Calendar/ follow up appointments/ Ambulatory Nurse Navigator name and contact information when the patient is ready for discharge. Future Appointments   Date Time Provider Lisa Mixon   8/12/2019 11:00 AM Bigg Squires MD [address]       Non-[facility] follow up appointment(s): to be determined    Patient education focused on readmission zones as described as: The Red Zone: High risk for readmission, days 1-21  The Yellow Zone: Moderate  risk for readmission, days 22-29   The Green Zone: Lower risk for readmission, days                30 and after    The G Team will attempt to follow the patient from a distance while inpatient as well as be available for further transition/disposition needs. The [facility] team will continue to offer support during the 30- 90 day discharge from acute care setting. Notified the appropriate [facility] team members.      Past Medical History:   Diagnosis Date    Asthma     Chronic lung disease     COPD     Cystocele, midline     Diabetes mellitus ([address]     GERD (gastroesophageal reflux disease)     Hidradenitis suppurativa     Hyperlipidemia     Hypertension     SHERRIE on CPAP     CPAP    Stress incontinence        Bennett N[place] Heart MSN, RN, [facility]  HUG Program Care Transitions Nurse   [phone]

## 2019-07-01 NOTE — PROGRESS NOTES
Progress Note    Patient: Taya Costa MRN: [id number]  SSN: [id number]    YOB: 1960  Age: 60 y.o. Sex: female      Admit Date: 6/29/2019    LOS: 2 days     Subjective:    Patient is a 60year old female with a PMHx of COPD, DM, HTN, HFpEF, SHERRIE on CPAP, GERD, and Allergic rhinitis who presented with increased work of breething, cough and wheezing. She reports wheezing and coughing last night. SpO2 94% on 2L of O2 (which is her home O2 requirement). She complains of a bump on her scalp that has been steadily growing over the past week. Causes her some discomfort, but she denies any drainage from the site. Review of Systems   Constitutional: Negative for chills and fever. HENT: Negative for sinus pain and sore throat. Respiratory: Positive for cough, sputum production, shortness of breath and wheezing. Cardiovascular: Negative for chest pain, palpitations and leg swelling. Gastrointestinal: Negative for abdominal pain, constipation, diarrhea, nausea and vomiting. Genitourinary: Negative for dysuria and urgency. Objective:     Vitals:    07/01/19 0807 07/01/19 0829 07/01/19 1200 07/01/19 1623   BP: 140/79  166/85 122/67   Pulse: 72  72 92   Resp: 18  19 16   Temp: 97.2 °F (36.2 °C)  96.2 °F (35.7 °C) 96.3 °F (35.7 °C)   SpO2: 94% 94% 95% 94%   Weight:            Intake and Output:  Current Shift: 07/01 0701 - 07/01 1900  In: 1560 [P.O.:1560]  Out: 1750 [Urine:1750]  Last three shifts: 06/29 1901 - 07/01 0700  In: 1500 [P.O.:1500]  Out: 900 [Urine:900]    Physical Exam:   Physical Exam   Constitutional: She is oriented to person, place, and time and well-developed, well-nourished, and in no distress. Neck: Neck supple. No JVD present. Cardiovascular: Normal rate and regular rhythm. Exam reveals no gallop and no friction rub. No murmur heard. Pulmonary/Chest: She has wheezes. Increased work of breathing   Abdominal: Soft.  Bowel sounds are normal. She exhibits no distension. Musculoskeletal: She exhibits no edema. Lymphadenopathy:     She has no cervical adenopathy. Neurological: She is alert and oriented to person, place, and time. Skin: Skin is warm and dry. Well circumscribed abscess on posterior scalp. Dry/scaling. No drainage today. Mildly tender to touch. Non erythematous. Lab/Data Review:    Recent Results (from the past 24 hour(s))   GLUCOSE, POC    Collection Time: 06/30/19  9:44 PM   Result Value Ref Range    Glucose (POC) 232 (H) 70 - 612 mg/dL   METABOLIC PANEL, BASIC    Collection Time: 07/01/19  4:19 AM   Result Value Ref Range    Sodium 139 136 - 145 mmol/L    Potassium 3.4 (L) 3.5 - 5.5 mmol/L    Chloride 102 100 - 108 mmol/L    CO2 32 21 - 32 mmol/L    Anion gap 5 3.0 - 18 mmol/L    Glucose 202 (H) 74 - 99 mg/dL    BUN 13 7.0 - 18 MG/DL    Creatinine 0.97 0.6 - 1.3 MG/DL    BUN/Creatinine ratio 13 12 - 20      GFR est AA >60 >60 ml/min/1.73m2    GFR est non-AA 59 (L) >60 ml/min/1.73m2    Calcium 9.3 8.5 - 10.1 MG/DL   MAGNESIUM    Collection Time: 07/01/19  4:19 AM   Result Value Ref Range    Magnesium 2.0 1.6 - 2.6 mg/dL   CBC WITH AUTOMATED DIFF    Collection Time: 07/01/19  4:19 AM   Result Value Ref Range    WBC 7.3 4.6 - 13.2 K/uL    RBC 4.02 (L) 4.20 - 5.30 M/uL    HGB 12.1 12.0 - 16.0 g/dL    HCT 35.4 35.0 - 45.0 %    MCV 88.1 74.0 - 97.0 FL    MCH 30.1 24.0 - 34.0 PG    MCHC 34.2 31.0 - 37.0 g/dL    RDW 13.9 11.6 - 14.5 %    PLATELET 973 306 - 907 K/uL    MPV 8.9 (L) 9.2 - 11.8 FL    NEUTROPHILS 64 40 - 73 %    LYMPHOCYTES 29 21 - 52 %    MONOCYTES 7 3 - 10 %    EOSINOPHILS 0 0 - 5 %    BASOPHILS 0 0 - 2 %    ABS. NEUTROPHILS 4.7 1.8 - 8.0 K/UL    ABS. LYMPHOCYTES 2.1 0.9 - 3.6 K/UL    ABS. MONOCYTES 0.5 0.05 - 1.2 K/UL    ABS. EOSINOPHILS 0.0 0.0 - 0.4 K/UL    ABS.  BASOPHILS 0.0 0.0 - 0.1 K/UL    DF AUTOMATED     GLUCOSE, POC    Collection Time: 07/01/19  8:05 AM   Result Value Ref Range    Glucose (POC) 134 (H) 70 - 110 mg/dL   GLUCOSE, POC    Collection Time: 07/01/19 11:30 AM   Result Value Ref Range    Glucose (POC) 280 (H) 70 - 110 mg/dL   POTASSIUM    Collection Time: 07/01/19  4:00 PM   Result Value Ref Range    Potassium 4.6 3.5 - 5.5 mmol/L   GLUCOSE, POC    Collection Time: 07/01/19  4:28 PM   Result Value Ref Range    Glucose (POC) 272 (H) 70 - 110 mg/dL       Assessment:     Principal Problem:    COPD with acute exacerbation ([facility] (5/24/2015)    Active Problems:    Essential hypertension, benign (9/30/2012)      Overview: controlled      Diabetes mellitus, type 2 ([facility] (9/30/2012)      Esophageal reflux (9/30/2012)      Allergic rhinitis (3/75/0775)      Diastolic CHF, chronic ([facility] (2/9/2017)      Overview: 6/18 NYHA 3      Hyperlipidemia (1/24/2018)      Overview: 6/18 used to take statins     Abscess (posterir scalp)    Plan:   1. COPD with acute exacerbation  - Patient is still wheezing and SOB   - continue Duonebs q4hrs   - Continue 2L O2- this is patients home O2, SpO2 is 94%  - Continue Prednisone 60mg- Patient needs taper for discharge  - Levaquin 750mg every day for 7 days  - Continue home singulair     2. HTN/HFpEF  - restart home BP meds as patients blood pressure was up to 166/85 this morning (home BP meds were previously held due to soft Bps). 3. DM  - continue Lantus 30U + SSI   - diabetic diet   - continue fingersticks QHS  -  Admission Hgb A1c: 8.1.  -  Cannot tolerate Metformin    4. GERD  - Pantoprazole 40mg     5. Abscess on posterior scalp  - Monitor, start hot compress to encourage drainage if it is still bothering her. No signs of systemic infection.      5. SHERRIE  - continue CPAP at night      Diet: Diabetic, low salt  DVT prophylaxis: Lovenox   Code status: Full  [place] [facility] [address], Daughter, [phone]                Signed By: Cheo Brown MD     July 1, 2019

## 2019-07-01 NOTE — PROGRESS NOTES
Problem: Mobility Impaired (Adult and Pediatric)  Goal: *Acute Goals and Plan of Care (Insert Text)  Description  Physical Therapy Goals  Initiated 6/30/2019 and to be accomplished within 7 day(s)  1. Patient will move from supine to sit and sit to supine  in bed with independence. 2.  Patient will transfer from bed to chair and chair to bed with independence using the least restrictive device. 3.  Patient will perform sit to stand with independence. 4.  Patient will ambulate with modified independence for 50 feet with the least restrictive device. 5.  Patient will ascend/descend 4 stairs with 1 handrail(s) with modified independence. PLOF: Ind with ambulation at home, uses RW for community ambulation, aid comes Monday-Friday   Outcome: Progressing Towards Goal       PHYSICAL THERAPY TREATMENT    Patient: Madison Jensen (65 y.o. female)  Date: 7/1/2019  Diagnosis: COPD exacerbation ([facility] [J44.1] COPD with acute exacerbation ([facility]       Precautions:    PLOF: see above    ASSESSMENT:  Pt cleared for PT treatment session per nursing. Pt received sitting EOB, NC donned in place, and agreeable to therapy. Pt's vitals prior to mobility: SpO2 96% on 2L O2, HR 98 bpm. Pt Mod I for sit<>stands demonstrating safe sequencing and technique. Pt demos good static standing balance to don gown to cover backside. Pt ambulated ~8 ft in room with no AD and SUPV to navigate tight spaces. Pt also ambulated 200 ft in franco with RW and SBA for line management as pt remained on 2L O2 throughout session. Pt required 3 standing rest breaks lasting ~30 seconds due to fatigue and dyspnea. Pt instructed in pursed lip breathing to aid in improving dyspnea. Pt's vitals stable throughout session with O2 desat to 93% during ambulation and HR increasing to 108 bpm. Pt returned to sitting EOB and verbalizes fatigue after ambulation.  Pt left sitting EOB, NC donned in place, food services at bedside, and all needs met/within reach.   Progression toward goals:   ?      Improving appropriately and progressing toward goals  ? Improving slowly and progressing toward goals  ? Not making progress toward goals and plan of care will be adjusted     PLAN:  Patient continues to benefit from skilled intervention to address the above impairments. Continue treatment per established plan of care. Discharge Recommendations:  Home Health  Further Equipment Recommendations for Discharge:  N/A, pt owns RW     SUBJECTIVE:   Patient stated ? I'm always short-winded, that's normal. I'm good to go for a walk.?    OBJECTIVE DATA SUMMARY:   Critical Behavior:  Neurologic State: Alert, Appropriate for age, Eyes open spontaneously  Orientation Level: Oriented X4  Cognition: Appropriate decision making, Appropriate safety awareness, Recognition of people/places, Follows commands  Safety/Judgement: Awareness of environment  Functional Mobility Training:  Bed Mobility:  Pt sitting EOB upon arrival.   Transfers:  Sit to Stand: Modified independent  Stand to Sit: Modified independent  Balance:  Sitting: Intact  Standing: Intact; With support  Standing - Static: Good  Standing - Dynamic : Good(-)  Ambulation/Gait Training:  Distance (ft): 200 Feet (ft)  Assistive Device: Walker, rolling  Ambulation - Level of Assistance: Stand-by assistance  Gait Abnormalities: Decreased step clearance    Pain:  Pain level pre-treatment: 0/10, verbalized soreness around B ribcage from coughing  Pain level post-treatment: 0/10     Activity Tolerance:   Good-  Please refer to the flowsheet for vital signs taken during this treatment. After treatment:   ? Patient left in no apparent distress sitting up in chair  ? Patient left in no apparent distress in bed: sitting EOB  ? Call bell left within reach  ? Nursing notified  ? Caregiver present: Food services at bedside  ? Bed alarm activated  ?  SCDs applied      COMMUNICATION/EDUCATION:   ?         Role of Physical Therapy in the acute care setting. ?         Fall prevention education was provided and the patient/caregiver indicated understanding. ? Patient/family have participated as able in working toward goals and plan of care. ?         Patient/family agree to work toward stated goals and plan of care. ?         Patient understands intent and goals of therapy, but is neutral about his/her participation. ? Patient is unable to participate in stated goals/plan of care: ongoing with therapy staff.   ?         Other: pursed lip breathing        Woody Patiño PTA   Time Calculation: 14 mins

## 2019-07-01 NOTE — ACP (ADVANCE CARE PLANNING)
Advance Directive:  not on file; education provided. The patient states \"I want everything done. If it gets to the point where I can't decide. My children will decide. They all know what I want. \"

## 2019-07-01 NOTE — DIABETES MGMT
NUTRITIONAL ASSESSMENT GLYCEMIC CONTROL/ PLAN OF CARE     Darius Camacho           60 y.o.           6/29/2019                 1. Acute exacerbation of chronic obstructive pulmonary disease (COPD) (HCC)       INTERVENTIONS/PLAN:   Consider addition of prandial Lispro insulin (3 units TID before meals)   ASSESSMENT:   Pt is a 60year old female with a past medical history significant for COPD, type 2 diabetes, hypertension, SHERRIE, and GERD. Blood glucose fluctuating above targets, possibly related to steroids. Pt receives prednisone 60 mg daily with breakfast. Reports blood glucose has been running high prior to admission (pt was also taking prednisone at home every other day). Pt reports she has stopped putting sugar in her coffee and has switched to sugar free beverages. Reports she has seen improvement in blood sugar after doing so. Pt encouraged to attend free outpatient diabetes education classes.      Diabetes Management:   Recent blood glucose:    6/30/2019 16:22 6/30/2019 21:44 7/1/2019 08:05 7/1/2019 11:30   363 (H) 232 (H) 134 (H) 280 (H)    Within target range (non-ICU: <140; ICU<180): [] Yes   [x]  No    Current Insulin regimen:   Lantus insulin 30 units every bedtime   Correctional Lispro insulin 4 times daily ACHS (very resistant)  Home medication/insulin regimen:   Lantus insulin 30 units nightly   Novolog per sliding scale  HbA1c: 8.1%  Adequate glycemic control PTA:  [] Yes  [x] No     SUBJECTIVE/OBJECTIVE:   Information obtained from: patient, chart review     Diet: Diabetic consistent carbohydrate, 2 gram NA    Patient Vitals for the past 100 hrs:   % Diet Eaten   07/01/19 1330 100 %   07/01/19 0921 95 %   06/30/19 1748 100 %   06/30/19 1231 75 %   06/30/19 0921 90 %     Medications: [x]  Reviewed     Most Recent POC Glucose:   Recent Labs     07/01/19  0419 06/30/19  0313 06/29/19  1132   * 163* 213*      Labs:   Lab Results   Component Value Date/Time    Hemoglobin A1c 8.1 (H) 06/30/2019 03:13 AM     Lab Results   Component Value Date/Time    Sodium 139 07/01/2019 04:19 AM    Potassium 3.4 (L) 07/01/2019 04:19 AM    Chloride 102 07/01/2019 04:19 AM    CO2 32 07/01/2019 04:19 AM    Anion gap 5 07/01/2019 04:19 AM    Glucose 202 (H) 07/01/2019 04:19 AM    BUN 13 07/01/2019 04:19 AM    Creatinine 0.97 07/01/2019 04:19 AM    Calcium 9.3 07/01/2019 04:19 AM    Magnesium 2.0 07/01/2019 04:19 AM    Phosphorus 3.1 01/19/2019 06:51 AM    Albumin 3.7 06/29/2019 11:32 AM     Anthropometrics: Wt Readings from Last 1 Encounters:   07/01/19 112.4 kg (247 lb 14.4 oz)      Ht Readings from Last 1 Encounters:   06/24/19 5' 3\" (1.6 m)     Estimated Nutrition Needs:  7257-3863 Kcal/day, 54-67 grams protein/day  Based on:   [x] Actual BW    [] IBW   [] Adjusted BW  (67 kg)    Nutrition Diagnoses:    Altered nutrition related lab value related to diabetes as evidenced by Hemoglobin A1c of 8.1%  Nutrition Interventions: diabetes education, assessment of home management  Goal: Blood glucose will be within target range of  mg/dL by 7/03/19     Nutrition Monitoring and Evaluation    []     Monitor po intake on meal rounds  [x]     Continue inpatient monitoring and intervention  []     Other:    Alexandria Abacra, [address]

## 2019-07-01 NOTE — CDMP QUERY
Patient admitted with COPD exacerbation . noted to have K+ level  3.4;  . If possible, please document in progress notes and d/c summary if you are evaluating and/or treating any of the following: 
  
? hypokalemia 
? Other, please specify ? Unable to Determine The medical record reflects the following: 
 
  Risk Factors: admitted with COPD Clinical Indicators: 6/29-   K+   4.0;      6/30-   K+    3.7;     7/1-   K+    3.4 Treatment   ( : K  dur  ( k-cheryle con )  SR  20 meq po   q   2 hrs  
repeat K+   level at 1500 Thank you,    Soto Montgomery RN   CCDS  x [phone]

## 2019-07-01 NOTE — PROGRESS NOTES
conducted an initial consultation and Spiritual Assessment for Vanesa Khalil, who is a 60 y.o.,female. Patients Primary Language is: [place]. According to the patients EMR Gnosticist Affiliation is: Arlet Brumfield.     The reason the Patient came to the hospital is:   Patient Active Problem List    Diagnosis Date Noted    Atelectasis of right lung 12/14/2018    Acute exacerbation of chronic obstructive pulmonary disease (COPD) ([address] 10/07/2018    Bilateral carotid bruits 07/30/2018    Palpitations 07/30/2018    Type 2 diabetes with nephropathy ([address] 05/30/2018    Hyperlipidemia 01/24/2018    COPD with acute bronchitis ([address] 06/05/1542    Diastolic CHF, chronic ([address] 02/09/2017    Diverticulosis 02/09/2017    COPD exacerbation ([address] 02/08/2017    Acute exacerbation of COPD with asthma ([address] 02/08/2017    Obesity, Class III, BMI 40-49.9 (morbid obesity) ([address] 08/09/2016    Chest pain 08/09/2016    Dyspnea 08/09/2016    COPD with acute exacerbation ([address] 05/24/2015    COPD (chronic obstructive pulmonary disease) (HCC) 03/27/2015    Allergic rhinitis 03/27/2015    Essential hypertension, benign 09/30/2012    Diabetes mellitus, type 2 ([address] 09/30/2012    Esophageal reflux 09/30/2012    SHERRIE on CPAP         The  provided the following Interventions:  Initiated a relationship of care and support. Explored issues of scout, belief, spirituality and Episcopal/ritual needs while hospitalized. Listened empathically. Provided chaplaincy education. Provided information about Spiritual Care Services. Offered prayer and assurance of continued prayers on patient's behalf. Chart reviewed. The following outcomes where achieved:  Patient shared limited information about both their medical narrative and spiritual journey/beliefs. Patient processed feeling about current hospitalization. Patient expressed gratitude for 's visit.     Assessment:  Patient does not have any Episcopal/cultural needs that will affect patients preferences in health care. There are no spiritual or Episcopal issues which require intervention at this time. Plan:  Chaplains will continue to follow and will provide pastoral care on an as needed/requested basis.  recommends bedside caregivers page  on duty if patient shows signs of acute spiritual or emotional distress.     [id number] [facility]   [phone]

## 2019-07-02 ENCOUNTER — HOME HEALTH ADMISSION (OUTPATIENT)
Dept: HOME HEALTH SERVICES | Facility: HOME HEALTH | Age: 59
End: 2019-07-02

## 2019-07-02 VITALS
RESPIRATION RATE: 18 BRPM | WEIGHT: 245.3 LBS | HEART RATE: 74 BPM | DIASTOLIC BLOOD PRESSURE: 66 MMHG | BODY MASS INDEX: 43.45 KG/M2 | SYSTOLIC BLOOD PRESSURE: 135 MMHG | OXYGEN SATURATION: 97 % | TEMPERATURE: 96.7 F

## 2019-07-02 LAB
ANION GAP SERPL CALC-SCNC: 5 MMOL/L (ref 3–18)
BASOPHILS # BLD: 0 K/UL (ref 0–0.1)
BASOPHILS NFR BLD: 0 % (ref 0–2)
BUN SERPL-MCNC: 16 MG/DL (ref 7–18)
BUN/CREAT SERPL: 18 (ref 12–20)
CALCIUM SERPL-MCNC: 9.4 MG/DL (ref 8.5–10.1)
CHLORIDE SERPL-SCNC: 102 MMOL/L (ref 100–108)
CO2 SERPL-SCNC: 32 MMOL/L (ref 21–32)
CREAT SERPL-MCNC: 0.88 MG/DL (ref 0.6–1.3)
DIFFERENTIAL METHOD BLD: ABNORMAL
EOSINOPHIL # BLD: 0 K/UL (ref 0–0.4)
EOSINOPHIL NFR BLD: 0 % (ref 0–5)
ERYTHROCYTE [DISTWIDTH] IN BLOOD BY AUTOMATED COUNT: 13.7 % (ref 11.6–14.5)
GLUCOSE BLD STRIP.AUTO-MCNC: 140 MG/DL (ref 70–110)
GLUCOSE BLD STRIP.AUTO-MCNC: 215 MG/DL (ref 70–110)
GLUCOSE BLD STRIP.AUTO-MCNC: 310 MG/DL (ref 70–110)
GLUCOSE SERPL-MCNC: 179 MG/DL (ref 74–99)
HCT VFR BLD AUTO: 35 % (ref 35–45)
HGB BLD-MCNC: 11.5 G/DL (ref 12–16)
LYMPHOCYTES # BLD: 2.6 K/UL (ref 0.9–3.6)
LYMPHOCYTES NFR BLD: 34 % (ref 21–52)
MAGNESIUM SERPL-MCNC: 1.9 MG/DL (ref 1.6–2.6)
MCH RBC QN AUTO: 29.2 PG (ref 24–34)
MCHC RBC AUTO-ENTMCNC: 32.9 G/DL (ref 31–37)
MCV RBC AUTO: 88.8 FL (ref 74–97)
MONOCYTES # BLD: 0.4 K/UL (ref 0.05–1.2)
MONOCYTES NFR BLD: 6 % (ref 3–10)
NEUTS SEG # BLD: 4.6 K/UL (ref 1.8–8)
NEUTS SEG NFR BLD: 60 % (ref 40–73)
PLATELET # BLD AUTO: 255 K/UL (ref 135–420)
PMV BLD AUTO: 9 FL (ref 9.2–11.8)
POTASSIUM SERPL-SCNC: 3.9 MMOL/L (ref 3.5–5.5)
RBC # BLD AUTO: 3.94 M/UL (ref 4.2–5.3)
SODIUM SERPL-SCNC: 139 MMOL/L (ref 136–145)
WBC # BLD AUTO: 7.7 K/UL (ref 4.6–13.2)

## 2019-07-02 PROCEDURE — 94640 AIRWAY INHALATION TREATMENT: CPT

## 2019-07-02 PROCEDURE — 74011000250 HC RX REV CODE- 250: Performed by: STUDENT IN AN ORGANIZED HEALTH CARE EDUCATION/TRAINING PROGRAM

## 2019-07-02 PROCEDURE — 83735 ASSAY OF MAGNESIUM: CPT

## 2019-07-02 PROCEDURE — 74011636637 HC RX REV CODE- 636/637: Performed by: STUDENT IN AN ORGANIZED HEALTH CARE EDUCATION/TRAINING PROGRAM

## 2019-07-02 PROCEDURE — 74011250637 HC RX REV CODE- 250/637: Performed by: STUDENT IN AN ORGANIZED HEALTH CARE EDUCATION/TRAINING PROGRAM

## 2019-07-02 PROCEDURE — 82962 GLUCOSE BLOOD TEST: CPT

## 2019-07-02 PROCEDURE — 97116 GAIT TRAINING THERAPY: CPT

## 2019-07-02 PROCEDURE — 94660 CPAP INITIATION&MGMT: CPT

## 2019-07-02 PROCEDURE — 85025 COMPLETE CBC W/AUTO DIFF WBC: CPT

## 2019-07-02 PROCEDURE — 77010033678 HC OXYGEN DAILY: Performed by: FAMILY MEDICINE

## 2019-07-02 PROCEDURE — 36415 COLL VENOUS BLD VENIPUNCTURE: CPT

## 2019-07-02 PROCEDURE — 80048 BASIC METABOLIC PNL TOTAL CA: CPT

## 2019-07-02 RX ORDER — PREDNISONE 10 MG/1
TABLET ORAL
Qty: 53 TAB | Refills: 0 | Status: SHIPPED | OUTPATIENT
Start: 2019-07-02 | End: 2019-07-16 | Stop reason: ALTCHOICE

## 2019-07-02 RX ORDER — ALBUTEROL SULFATE 2.5 MG/.5ML
2.5 SOLUTION RESPIRATORY (INHALATION)
Qty: 60 ML | Refills: 3 | Status: SHIPPED | OUTPATIENT
Start: 2019-07-02 | End: 2020-07-07

## 2019-07-02 RX ORDER — IPRATROPIUM BROMIDE AND ALBUTEROL SULFATE 2.5; .5 MG/3ML; MG/3ML
3 SOLUTION RESPIRATORY (INHALATION)
Status: DISCONTINUED | OUTPATIENT
Start: 2019-07-02 | End: 2019-07-02 | Stop reason: HOSPADM

## 2019-07-02 RX ORDER — LEVOFLOXACIN 750 MG/1
750 TABLET ORAL EVERY 24 HOURS
Qty: 4 TAB | Refills: 0 | Status: SHIPPED | OUTPATIENT
Start: 2019-07-02 | End: 2019-07-06

## 2019-07-02 RX ADMIN — PANTOPRAZOLE SODIUM 40 MG: 40 TABLET, DELAYED RELEASE ORAL at 08:46

## 2019-07-02 RX ADMIN — HYDROCHLOROTHIAZIDE 12.5 MG: 25 TABLET ORAL at 08:46

## 2019-07-02 RX ADMIN — PREDNISONE 60 MG: 20 TABLET ORAL at 08:46

## 2019-07-02 RX ADMIN — FLUTICASONE PROPIONATE 2 SPRAY: 50 SPRAY, METERED NASAL at 08:48

## 2019-07-02 RX ADMIN — ASPIRIN 81 MG: 81 TABLET, COATED ORAL at 08:46

## 2019-07-02 RX ADMIN — IPRATROPIUM BROMIDE AND ALBUTEROL SULFATE 3 ML: .5; 3 SOLUTION RESPIRATORY (INHALATION) at 04:09

## 2019-07-02 RX ADMIN — IPRATROPIUM BROMIDE AND ALBUTEROL SULFATE 3 ML: .5; 3 SOLUTION RESPIRATORY (INHALATION) at 00:18

## 2019-07-02 RX ADMIN — LISINOPRIL 20 MG: 20 TABLET ORAL at 08:46

## 2019-07-02 RX ADMIN — IPRATROPIUM BROMIDE AND ALBUTEROL SULFATE 3 ML: .5; 3 SOLUTION RESPIRATORY (INHALATION) at 08:35

## 2019-07-02 NOTE — PROGRESS NOTES
Discharge Planning:  Pt will discharge to her home. Pt signed [address] for [address] program. Pt's family will transport this pt home when ready.     [facility]

## 2019-07-02 NOTE — DISCHARGE INSTRUCTIONS
Patient Education        COPD Exacerbation Plan: Care Instructions  Your Care Instructions    If you have chronic obstructive pulmonary disease (COPD), your usual shortness of breath could suddenly get worse. You may start coughing more and have more mucus. This flare-up is called a COPD exacerbation (say \"kw-[id number]-rosi\"). A lung infection or air pollution could set off an exacerbation. Sometimes it can happen after a quick change in temperature or being around chemicals. Work with your doctor to make a plan for dealing with an exacerbation. You can better manage it if you plan ahead. Follow-up care is a key part of your treatment and safety. Be sure to make and go to all appointments, and call your doctor if you are having problems. It's also a good idea to know your test results and keep a list of the medicines you take. How can you care for yourself at home? During an exacerbation  · Do not panic if you start to have one. Quick treatment at home may help you prevent serious breathing problems. If you have a COPD exacerbation plan that you developed with your doctor, follow it. · Take your medicines exactly as your doctor tells you.  ? Use your inhaler as directed by your doctor. If your symptoms do not get better after you use your medicine, have someone take you to the emergency room. Call an ambulance if necessary. ? With inhaled medicines, a spacer or a nebulizer may help you get more medicine to your lungs. Ask your doctor or pharmacist how to use them properly. Practice using the spacer in front of a mirror before you have an exacerbation. This may help you get the medicine into your lungs quickly. ? If your doctor has given you steroid pills, take them as directed. ? Your doctor may have given you a prescription for antibiotics, which you can fill if you need it. ? Talk to your doctor if you have any problems with your medicine.  And call your doctor if you have to use your antibiotic or steroid pills. Preventing an exacerbation  · Do not smoke. This is the most important step you can take to prevent more damage to your lungs and prevent problems. If you already smoke, it is never too late to stop. If you need help quitting, talk to your doctor about stop-smoking programs and medicines. These can increase your chances of quitting for good. · Take your daily medicines as prescribed. · Avoid colds and flu. ? Get a pneumococcal vaccine. ? Get a flu vaccine each year, as soon as it is available. Ask those you live or work with to do the same, so they will not get the flu and infect you. ? Try to stay away from people with colds or the flu. ? Wash your hands often. · Avoid secondhand smoke; air pollution; cold, dry air; hot, humid air; and high altitudes. Stay at home with your windows closed when air pollution is bad. · Learn breathing techniques for COPD, such as breathing through pursed lips. These techniques can help you breathe easier during an exacerbation. When should you call for help? Call 911 anytime you think you may need emergency care. For example, call if:    · You have severe trouble breathing.     · You have severe chest pain.    Call your doctor now or seek immediate medical care if:    · You have new or worse shortness of breath.     · You develop new chest pain.     · You are coughing more deeply or more often, especially if you notice more mucus or a change in the color of your mucus.     · You cough up blood.     · You have new or increased swelling in your legs or belly.     · You have a fever.    Watch closely for changes in your health, and be sure to contact your doctor if:    · You need to use your antibiotic or steroid pills.     · Your symptoms are getting worse. Where can you learn more? Go to http://etelvina-olivier.info/. Enter W393 in the search box to learn more about \"COPD Exacerbation Plan: Care Instructions. \"  Current as of: September 5, 2018  Content Version: 11.9  © 3198-6405 [employer]. Care instructions adapted under license by [employer] (which disclaims liability or warranty for this information). If you have questions about a medical condition or this instruction, always ask your healthcare professional. [address] any warranty or liability for your use of this information. Patient Education     Advance Care Planning With COPD: After Your Visit  Your Care Instructions  Taking care of yourself when you have chronic obstructive pulmonary disease (COPD) will help you feel better and live longer. But the disease gets worse over time. If your health is getting worse, you may want to make decisions about end-of-life care. Planning for the end of your life does not mean that you are giving up. It is a way to make sure that your wishes are met. Also, being clear about your wishes will make it easier for your loved ones. Making plans while you still can may ease your mind. It may help make your final days less stressful and give them more meaning. As part of your planning, it is a good idea to write advance directives. These are legal papers that tell doctors and family members your values and wishes for care at the end of your life. They may include a living will and a durable power of . You don't need a  to write these papers. Make sure that your doctor has a copy of these on file. And give a copy to a family member or close friend. You can have end-of-life care at home or in a nursing home. No matter where you are, hospice may be able to help with your care. Hospice provides pain relief. And it can give emotional and spiritual support to you and your family. You may work with a team of health professionals. The team may include doctors, nurses, social workers, and counselors.  At all stages of your disease, your team can give treatment to manage your symptoms and keep you comfortable. Follow-up care is a key part of your treatment and safety. Be sure to make and go to all appointments, and call your doctor if you are having problems. It's also a good idea to know your test results and keep a list of the medicines you take. How can you care for yourself at home? · Talk openly and honestly with your doctor. This is the best way to understand the decisions you will need to make as your health changes. Know that you can always change your mind. · Ask your doctor about life-support measures that are commonly used. These include tube feedings, breathing machines, and fluids given through a vein (IV). Understanding these treatments will help you decide whether you want them. · You may decide that you would like to have life-supporting treatments for a limited time. This allows a trial period to see if they will help you. You may also decide that you want your doctor to take only certain measures to keep you alive. It is important to spell out these conditions so that your doctor and family understand them. · Talk to your doctor about an estimate of how long you are likely to live. Your doctor likely will not be able to tell you exactly how long you have, but he or she may be able to give you a general time frame. He or she may also be able to point to things (such as oxygen needs and trouble caring for yourself) that cole the advance of the disease. And he or she can give you an idea about what usually happens with COPD. · Ask a friend or family member to make decisions for you when you no longer can. Talk to this person about the kinds of treatments you want and those that you do not want. Make sure that this person understands your wishes. If this person is not your durable power of  named in your advance directive, talk with your durable power of  too. · If you have not already done so, prepare a list of advance directives.  These are instructions to your doctor and family members about what kind of care you want if you can't speak or express yourself. Ask your doctor or your [facility] department for information on how to write advance directives. They may have forms you can use. ¨ Think about a do-not-resuscitate order, or DNR. This order asks that no extra treatments be used if your heart stops. Extra treatments include electrical shock to restart your heart, a machine to breathe for you, and medicines that can stimulate the heart. If you decide to have a DNR order, ask your doctor to write it. Place the order in your home where everyone can easily see it. ¨ Think about a living will. A living will explains your wishes in case you are in a coma or can't communicate. Living denton tell doctors to use or not use treatments that would keep you alive. ¨ Think about naming a person to make decisions about your care if you are not able to. This is called a durable power of . Most people ask a close friend or family member. Talk to this person about the kinds of treatments you want and those that you do not want. ¨ All of these forms are simple to change. Tell your doctor what you want to change. Ask him or her to make a note in your medical file. Give your family updated copies of the papers. When should you call for help? Be sure to contact your doctor if you have any questions. Where can you learn more? Go to [employer].be  Enter G673 in the search box to learn more about \"Advance Care Planning With COPD: After Your Visit. \"   © 3773-6142 [employer]. Care instructions adapted under license by [facility] (which disclaims liability or warranty for this information).  This care instruction is for use with your licensed healthcare professional. If you have questions about a medical condition or this instruction, always ask your healthcare professional. [place] [address] any warranty or liability for your use of this information.   Content Version: 13.6.706077; Current as of: September 30, 2013

## 2019-07-02 NOTE — PROGRESS NOTES
Problem: Mobility Impaired (Adult and Pediatric)  Goal: *Acute Goals and Plan of Care (Insert Text)  Description  Physical Therapy Goals  Initiated 6/30/2019 and to be accomplished within 7 day(s)  1. Patient will move from supine to sit and sit to supine  in bed with independence. 2.  Patient will transfer from bed to chair and chair to bed with independence using the least restrictive device. 3.  Patient will perform sit to stand with independence. 4.  Patient will ambulate with modified independence for 50 feet with the least restrictive device. 5.  Patient will ascend/descend 4 stairs with 1 handrail(s) with modified independence. PLOF: Ind with ambulation at home, uses RW for community ambulation, aid comes Monday-Friday   Outcome: Progressing Towards Goal    PHYSICAL THERAPY TREATMENT    Patient: Lana Aguilera (28 y.o. female)  Date: 7/2/2019  Diagnosis: COPD exacerbation ([facility] [address] [J44.1] COPD with acute exacerbation ([facility] [address]       Precautions:    PLOF: see above    ASSESSMENT:  Pt cleared for PT treatment session per nursing. Pt received sitting EOB, NC donned in place, and agreeable to therapy. Pt's vitals prior to mobility: SpO2 99% on 2L O2, HR 69 bpm /82 mmHg. Pt Mod I for sit<>stands and demos good static and dynamic balance upon standing. Pt ambulated ~8 ft in room with no AD and SUPV to navigate tight spaces. Pt also ambulated 300 ft in franco with RW and SBA/SUPV for line management as pt remained on 2L O2 throughout session. Pt pace increased compared to last session, pt did required 4 standing rest breaks lasting ~15 seconds due to fatigue. Stair negotiation deferred this session due to pt's overall fatigue, however, pt instructed in standing marches to simulate stairs and demos good step clearance >6 inches. Pt verbalizes fatigue post session and unable to assess vitals once returned sitting EOB due to multiple machines not reading.  Pt left sitting EOB, NC donned in place, visitor at bedside, and all needs met/within reach. Progression toward goals: Good  ? Improving appropriately and progressing toward goals  ? Improving slowly and progressing toward goals  ? Not making progress toward goals and plan of care will be adjusted     PLAN:  Patient continues to benefit from skilled intervention to address the above impairments. Continue treatment per established plan of care. Discharge Recommendations:  Home Health  Further Equipment Recommendations for Discharge:  N/A, pt owns RW     SUBJECTIVE:   Patient stated ? I'm getting there. ?    OBJECTIVE DATA SUMMARY:   Critical Behavior:  Neurologic State: Alert  Orientation Level: Oriented X4  Cognition: Appropriate decision making, Appropriate for age attention/concentration, Appropriate safety awareness, Follows commands  Safety/Judgement: Awareness of environment  Functional Mobility Training:  Bed Mobility:  Pt sitting EOB upon arrival.   Transfers:  Sit to Stand: Modified independent  Stand to Sit: Modified independent  Balance:  Sitting: Intact  Standing: Intact  Ambulation/Gait Training:  Distance (ft): 300 Feet (ft)  Assistive Device: Walker, rolling  Ambulation - Level of Assistance: Stand-by assistance;Supervision  Gait Abnormalities: Decreased step clearance    Therapeutic Exercises:   See flowsheet below. TE performed to simulate stair negotiation. EXERCISE   Sets   Reps   Active Active Assist   Passive Self ROM   Comments   Ankle Pumps    ? ? ? ?    Quad Sets/Glut Sets    ? ? ? ? Hold for 5 secs   Hamstring Sets   ? ? ? ? Short Arc Quads   ? ? ? ? Heel Slides   ? ? ? ? Straight Leg Raises   ? ? ? ? Hip Add   ? ? ? ? Hold for 5 secs, w/ pillow squeeze   Long Arc Quads   ? ? ? ? Seated Marching   ? ? ? ? Standing Marching 1 5 ? ? ? ? bilaterally      ? ? ? ?         Pain:  Pain level pre-treatment: 0/10  Pain level post-treatment: 0/10     Activity Tolerance:   Good  Please refer to the flowsheet for vital signs taken during this treatment. After treatment:   ? Patient left in no apparent distress sitting up in chair  ? Patient left in no apparent distress in bed: sitting EOB  ? Call bell left within reach  ? Nursing notified  ? Caregiver present: Visitor at bedside  ? Bed alarm activated  ? SCDs applied      COMMUNICATION/EDUCATION:   ?         Role of Physical Therapy in the acute care setting. ?         Fall prevention education was provided and the patient/caregiver indicated understanding. ? Patient/family have participated as able in working toward goals and plan of care. ?         Patient/family agree to work toward stated goals and plan of care. ?         Patient understands intent and goals of therapy, but is neutral about his/her participation. ? Patient is unable to participate in stated goals/plan of care: ongoing with therapy staff.   ?         Other:        Theone KYLE Hill   Time Calculation: 14 mins

## 2019-07-02 NOTE — NURSE NAVIGATOR
Transitional Care Team: JOSESITO Discharge Review    Date: 07/02/19  Time:  1:58 PM  Hospital Nurse Navigator: Chad Granado MSN, RN, [facility]    Narcisa Scott is a 60 y.o. female [facility] with COPD exacerbation ([facility] [address] [J44.1] on  6/29/2019. Primary Diagnosis  COPD with acute exacerbation ([facility] [address]    Review and Plan as below:  1. She needs continued encouragement to follow cardiac/DM diets: 2000 Calories; A1C 8.1%. 2. She states that Dr. Rick Dent is trying to get Trelegy authorized for her. 3. Physical therapy recommends home health, but the patient told this NN yesterday that she refuses to be homebound for home health. 4. Abscess on posterior scalp- it's being monitored by PCP office    5. Follow-up Information     Follow up With Specialties Details Why Contact Info    [facility] MEDICINE  Go on 7/9/2019 at 8:20 AM [address], DO Pulmonary Disease, Critical Care Medicine Go on 7/16/2019 at 1:45  [address]  [phone]            This Nurse Navigator accessed the patient's chart to offer support and placement in the [facility]. The Transitional Care Team bridges the gaps in care and education surrounding discharge from the acute care facility. The objective is to empower the patient and family in taking a proactive role in the task of preventing readmission within the first thirty days after discharge from the acute care setting. The Transitional Care Team is also involved in the efforts to reduce readmission to the acute care setting after stabilization and discharge from the acute care environment either to the skilled nursing facilities or community. EDUCATION / INTERVENTIONS OFFERED:      1. Discharge medication list was reviewed for accuracy and potential interactions.     2. Patient was instructed on specific signs/symptoms to look for with regards to worsening of their medical conditions. Learning was assessed using teach back. 3. Patient was given all educational material from the [facility] with services identified for complete wrap around services during their 30 day recovery phase. The patient and I discussed wrap around services including nurse navigation, care coordination, home health, transitional care appointments with their primary care provider and specialist team.      Patient education focused on readmission zones as described as: The Red Zone: High risk for readmission, days 1-21    The Yellow Zone: Moderate risk for readmission, days 22-29    The Green Zone: Lower risk for readmission, days 30 and after    4. Advance Care Planning: not on file; education provided. The patient states \"I want everything done. If it gets to the point where I can't decide. My children will decide. They all know what I want. \"     BARRIERS IDENTIFIED:    Ketty Duran expressed the following barriers to her discharge:   -Diet compliance needs to be enforced    PLAN:  A written individual care plan including education materials, a calendar individualized with their follow up appointments with primary care providers, specialist, and telephone numbers of healthcare personnel that are available for them to ask questions during the recovery phase was provided to the patient and/or family. The patient was discharged home with [facility] for COPD. The TC Team will follow the patient from a distance while inpatient as well as be available for further transition disposition as needed. The TC Team will continue to offer support 30- 90 days post discharge from the acute care setting. The appropriate TC Team members were notified.     Past Medical History:   Diagnosis Date    Asthma     Chronic lung disease     COPD     Cystocele, midline     Diabetes mellitus (HCC)     GERD (gastroesophageal reflux disease)     Hidradenitis suppurativa     Hyperlipidemia     Hypertension     SHERRIE on CPAP     CPAP    Stress incontinence        Advance Care Planning 6/29/2019   Patient's Healthcare Decision Maker is: -   Primary Decision Maker Name -   Primary Decision Maker Phone Number -   Primary Decision Maker Relationship to Patient -   Secondary Decision Maker Name -   Secondary Decision [facility] Phone Number -   Secondary Decision Maker Relationship to Patient -   Confirm Advance Directive None   Patient Would Like to Complete Advance Directive -   Does the patient have other document types -       Maninder Israel MSN, RN, [facility] Nurse   [phone]

## 2019-07-02 NOTE — ROUTINE PROCESS
Received report from Jessica Mendez. Pt AAOx3, NAD, breathing non labored, on O2 NC at 2L, HOB up. IV site clean, dry and intact. BIPAP at bedside. Bed at the lowest level on lock position, call bell w/i reach. Bedside and Verbal shift change report given to Jessica Mendez (oncoming nurse) by me (offgoing nurse). Report included the following information SBAR, Kardex, Intake/Output, MAR and Recent Results.

## 2019-07-02 NOTE — HOME CARE
Met with  patient in room. Verified address and telephone numbers. Explained services. [facility] COPD   Home care orders noted and arranged. Left contact information .     Miesha Barber RN, BSN   [employer]

## 2019-07-02 NOTE — PROGRESS NOTES
Progress Note    Patient: Vanesa Khalil MRN: [id number]  SSN: [id number]    YOB: 1960  Age: 60 y.o. Sex: female      Admit Date: 6/29/2019    LOS: 3 days     Subjective:    Patient is a 60year old female with a PMHx of COPD, DM, HTN, HFpEF, SHERRIE on CPAP, GERD, and Allergic rhinitis who presented with increased work of breething, cough and wheezing. She reports less wheezing and coughing last night. She is speaking in full sentences without any visible difficulty breathing. SpO2 95% on 2L of O2 (2L is her home O2 requirement). She states she is ready to go home today    The abscess on her scalp seems     Review of Systems   Constitutional: Negative for chills and fever. HENT: Negative for sinus pain and sore throat. Respiratory: Positive for cough, sputum production, shortness of breath and wheezing. Cardiovascular: Negative for chest pain, palpitations and leg swelling. Gastrointestinal: Negative for abdominal pain, constipation, diarrhea, nausea and vomiting. Genitourinary: Negative for dysuria and urgency. Objective:     Vitals:    07/02/19 0001 07/02/19 0022 07/02/19 0424 07/02/19 0738   BP: 119/60  120/62 132/74   Pulse: 77  71 70   Resp: 18  18 18   Temp: 97.5 °F (36.4 °C)  98 °F (36.7 °C) 97.2 °F (36.2 °C)   SpO2: 97% 98% 99% 95%   Weight:   111.3 kg (245 lb 4.8 oz)         Intake and Output:  Current Shift: 07/02 0701 - 07/02 1900  In: 520 [P.O.:520]  Out: 450 [Urine:450]  Last three shifts: 06/30 1901 - 07/02 0700  In: 1920 [P.O.:1920]  Out: 1750 [Urine:1750]    Physical Exam:   Physical Exam   Constitutional: She is oriented to person, place, and time and well-developed, well-nourished, and in no distress. Neck: Neck supple. No JVD present. Cardiovascular: Normal rate and regular rhythm. Exam reveals no gallop and no friction rub. No murmur heard. Pulmonary/Chest: Effort normal. No respiratory distress. She has wheezes. She has no rales. Expiratory wheezing on right side of chest   Abdominal: Soft. Bowel sounds are normal. She exhibits no distension. Musculoskeletal: She exhibits no edema. Lymphadenopathy:     She has no cervical adenopathy. Neurological: She is alert and oriented to person, place, and time. Skin: Skin is warm and dry. Well circumscribed abscess on posterior scalp. Dry/scaling. No drainage today. Mildly tender to touch. Non erythematous.           Lab/Data Review:    Recent Results (from the past 24 hour(s))   GLUCOSE, POC    Collection Time: 07/01/19 11:30 AM   Result Value Ref Range    Glucose (POC) 280 (H) 70 - 110 mg/dL   POTASSIUM    Collection Time: 07/01/19  4:00 PM   Result Value Ref Range    Potassium 4.6 3.5 - 5.5 mmol/L   GLUCOSE, POC    Collection Time: 07/01/19  4:28 PM   Result Value Ref Range    Glucose (POC) 272 (H) 70 - 110 mg/dL   GLUCOSE, POC    Collection Time: 07/01/19  9:24 PM   Result Value Ref Range    Glucose (POC) 236 (H) 70 - 706 mg/dL   METABOLIC PANEL, BASIC    Collection Time: 07/02/19  4:21 AM   Result Value Ref Range    Sodium 139 136 - 145 mmol/L    Potassium 3.9 3.5 - 5.5 mmol/L    Chloride 102 100 - 108 mmol/L    CO2 32 21 - 32 mmol/L    Anion gap 5 3.0 - 18 mmol/L    Glucose 179 (H) 74 - 99 mg/dL    BUN 16 7.0 - 18 MG/DL    Creatinine 0.88 0.6 - 1.3 MG/DL    BUN/Creatinine ratio 18 12 - 20      GFR est AA >60 >60 ml/min/1.73m2    GFR est non-AA >60 >60 ml/min/1.73m2    Calcium 9.4 8.5 - 10.1 MG/DL   MAGNESIUM    Collection Time: 07/02/19  4:21 AM   Result Value Ref Range    Magnesium 1.9 1.6 - 2.6 mg/dL   CBC WITH AUTOMATED DIFF    Collection Time: 07/02/19  4:21 AM   Result Value Ref Range    WBC 7.7 4.6 - 13.2 K/uL    RBC 3.94 (L) 4.20 - 5.30 M/uL    HGB 11.5 (L) 12.0 - 16.0 g/dL    HCT 35.0 35.0 - 45.0 %    MCV 88.8 74.0 - 97.0 FL    MCH 29.2 24.0 - 34.0 PG    MCHC 32.9 31.0 - 37.0 g/dL    RDW 13.7 11.6 - 14.5 %    PLATELET 820 158 - 519 K/uL    MPV 9.0 (L) 9.2 - 11.8 FL NEUTROPHILS 60 40 - 73 %    LYMPHOCYTES 34 21 - 52 %    MONOCYTES 6 3 - 10 %    EOSINOPHILS 0 0 - 5 %    BASOPHILS 0 0 - 2 %    ABS. NEUTROPHILS 4.6 1.8 - 8.0 K/UL    ABS. LYMPHOCYTES 2.6 0.9 - 3.6 K/UL    ABS. MONOCYTES 0.4 0.05 - 1.2 K/UL    ABS. EOSINOPHILS 0.0 0.0 - 0.4 K/UL    ABS. BASOPHILS 0.0 0.0 - 0.1 K/UL    DF AUTOMATED     GLUCOSE, POC    Collection Time: 07/02/19  7:42 AM   Result Value Ref Range    Glucose (POC) 140 (H) 70 - 110 mg/dL       Assessment:     Principal Problem:    COPD with acute exacerbation ([facility] (5/24/2015)    Active Problems:    Essential hypertension, benign (9/30/2012)      Overview: controlled      Diabetes mellitus, type 2 ([facility] (9/30/2012)      Esophageal reflux (9/30/2012)      Allergic rhinitis (7/45/4293)      Diastolic CHF, chronic ([facility] (2/9/2017)      Overview: 6/18 NYHA 3      Hyperlipidemia (1/24/2018)      Overview: 6/18 used to take statins     Abscess (posterir scalp)    Plan:   1. COPD with acute exacerbation- Pt was admitted for COPD exacerbation, tachypneic, increased WOB, hypoxic. Was started on duonebs, Prednisone, Levaquin. - Wheezing is improved, breathing comfortably on 2L of O2 with SpO2 >94%. - continue Duonebs q4hrs- will continue duonebs PRN at home when discharged. - Continue 2L O2- this is patients home O2, SpO2 is 94%  - Continue Prednisone 60mg- Patient needs taper for discharge. She take 10mg Prednisone BID long term at home  - Levaquin 750mg every day for 7 days. (Continue until 07/06)  - Continue home singulair     2. HTN/HFpEF  - Home BP meds were started yesterday. Patients BP is well controlled this morning (132/74). - Continue Lisinopril 40mg BID + HCTZ 12.5mg .      3. DM  - continue Lantus 30U + SSI . Patient understands how to adjust SSI when on Prednisone.   - diabetic diet   - continue fingersticks QHS  -  Admission Hgb A1c: 8.1.  -  Cannot tolerate Metformin    4.  GERD  - Stable, patient not complaining of any reflux symptoms  - Continue home medications. 5. Abscess on posterior scalp  - Monitor. Smaller today. No discharge noticed. Continue moist compress if it is bothering her.      5. SHERRIE  - continue CPAP at night      Diet: Diabetic, low salt  DVT prophylaxis: Lovenox   Code status: Full  [place] [address], D[place], [phone]                Signed By: Moni Christianson MD     July 2, 2019

## 2019-07-02 NOTE — PROGRESS NOTES
Kelli Chapman  RESPONSE TO [facility] INQUIRY      Patient: Linh Whittaker MRN: [id number]  CSN: [id number]    YOB: 1960  Age: 60 y.o. Sex: female         INQUIRY     Patient admitted with COPD exacerbation . noted to have K+ level  3.4;  . If possible, please document in progress notes and d/c summary if you are evaluating and/or treating any of the following:                ?          hypokalemia  ? Other, please specify  ? Unable to Determine     The medical record reflects the following:       Risk Factors: admitted with COPD       Clinical Indicators: 6/29-   K+   4.0;      6/30-   K+    3.7;     7/1-   K+    3.4         Treatment   ( : K  dur  ( k-cheryle con )  SR  20 meq po   q   2 hrs   repeat K+   level at 1500    RESPONSE     Patient has hypokalemia which has improved with supplementation.     Vivian Mcclellan DO, PGY-2   [facility] Medicine   Senior Pager: [phone]   July 2, 2019, 9:20 AM

## 2019-07-03 ENCOUNTER — PATIENT OUTREACH (OUTPATIENT)
Dept: PULMONOLOGY | Age: 59
End: 2019-07-03

## 2019-07-03 NOTE — PROGRESS NOTES
Chronic Obstructive Pulmonary Disease Initial Follow Up Call    Minh Mcmullen, RN, Nurse Navigator (NN) contacted [place] ,  by telephone to perform COPD Transitions of Care. Introduced self, role and reason for call. Verified Name and  as identifiers. Patient reported:      Not so great but better than I was     Nonproductive cough but when she coughs long and forceful finally gets up yellowish stuff   ( NN encouraged patient to continue to cough and DB/ use nebulizer as needed and pep device)  She voice understanding. Using CPAP nightly. Patient stated, have you heard if I was approved for my Trilogy ? \"  NN discussed with patient that writer hasn't heard anything but will refax her order to [facility] [facility]. Since she is back at home. My B/P low in hospital and they held my b/p meds but I am back on my lisinopril and HCTZ. .      I do have some wheezing     SOB with min assist ( NN encouraged patient to alternate rest and activity ) She voiced understanding. Using oxygen at 2 L  ( NN asked patient if she has an oxygen in use on her door )  Patient stated, \" no. \"  NN instructed her to call [employer] to bring a sign for her door. She reported that she had to call them anyway and she would . Feels tired ( Encouraged patient to give herself time to get stronger ) patient talked about her last 2 year of decline due to COPD and wanting to get stronger. ) NN performed active listening. NN discussed Pulmonary Rehab with patient which patient reported that she is very interested in it and had spoke with Dr. Evens Waddell about it last year but couldn't stay out of the hospital long enough.        mg/dl today       Patient denies:      SOB at rest    Dizziness    Increase HR/ Palpitations     N/V     Blurred vision    Hemoptysis     Having pulse oximeter ( NN informed patient to come by NN office on 16 when she sees Dr. Evens Wadedll and Sonia Dhillon will supply her with one.)  She stated, \"Thank You. \"      Noted Priorities:  Obtaining her NIV and pulse oximeter     Are you using a rescue inhaler? yes, Type: Albuterol , frequency :  Once since being home . Scheduled for every 4 hrs     Nebulizer? yes, frequency  3 times since being home . Scheduled for every 6 hrs. Zone:(Pt Reported)  yellow     Provider Notified: yes      Barriers to care? None noted at this time         Needs addressed from pathway:   24-48 Hours/Initial   Review/Verification:    ? Identify Care Team  ? Disposition (Home, SNF, IRF LTC, long term, Hospice)  ? DC Instructions, Education, and COPD Zones  ? Luis Felipe Bill in place. [facility])   ? Evaluate DME needs; arrange home equipment   ? **portable tanks to get to appointments  ? Advanced care planning (e.g.: Palliative Care; Hospice  ? Type of monitoring  ? Labs/Diagnostics to follow  ? Follow-up appts are arranged-   ? Spirometry Testing   ? Identify smoking status,  ? SSecond hand smoke exposure  ? 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 ?             ? Identify transportation    Med Rec*   ? PAM  ? PINKY  ? LAMA  ? LABA  ? Steroids- rinse mouth  ? ICS  ? LTOT  ? Antibiotics    Baseline Labs*  ? ABG  ? BMP  ? AAt  ? Sputum Culture      YOANA Documentation:  ? Goals  ? Challenges/Plan  ? Symptoms      Education Disease Management:    ? Comorbidity Management  ? SHERRIE- CPAP/ BiPAP  ? Advance medical directive status   ? Cornet/ Flute   ? IS  ? TCRS- DB  ? Abdominal/ purse lip breathing    Advanced Therapy:    ? Need for Non-invasive ventilator support?  ( NIV pending approval  /NN refaxed to [employer]       Surgery: N/A   ? Lung Volume Reduction Surgery (LVRS)  ? Bronchoscopic Lung Volume Reduction (BLVR)  ? Bullectomy  ?  Transplant         Pulmonologist consult while IP:  No     Palliative consult while IP:    no      PCP/Specialist follow up:   Future Appointments   Date Time Provider Lisa Mixon   7/16/2019  1:45 PM Esther Barnett Loida Rice MD [address]   8/12/2019 11:00 AM Mason Rosales MD [address]   7/9/19                          8:20 AM                    [facility]. Transportation: Drives self     COPD Assessment Test (CAT)    I never cough 0 1 2 3  X 4 5 I cough all the time   I have no phelgm (mucus) 0   1  X 2 3 4 5 My chest is completely full of phelgm (mucus)   My chest does not feel tight at all    0  X 1   2 3 4 5 My chest feels tight   When I walk up a hill or one flight of stairs I am not breathless   0 1 2 3 4 5  X When I walk up a hill or one flight of stairs I am very breathless   I am not limited doing any activities at home   0 1 2  X 3 4 5 I am very limited doing activities at home    I am confident leaving my home despite my condition  0  X 1 2 3 4 5 I am not at all confident leaving my home because of my lung condition   I sleep soundly  0 1  X 2 3 4 5 I don't sleep soundly because of my lung condition   I have lots of energy   0 1 2 3   4  X   5 I have no energy at all     TOTAL: 16                GOLD class- 1, 2, 3, 4  FEV1  ? GOLD 1(mild)=FEV1 ?80%   predicted  ? GOLD 2 (Moderate)=50% ? FEV 1< 80%  predicted  ? GOLD 3(severe)= 30% ? FEV1 < 50% predicted  ?  GOLD 4 (very severe)=<30% predicted      COPD Medications: PAM; PINKY; LAMA; LABA; ICS; PDE4 ; Macrolides ; O2 ( LTOT )    Med Rec*   PAM Short acting muscarinic antagonist   []  Atrovent HFA/ipratropium bromide    PINKY Short acting Beta2-agonist bronchodilators   []  Pro Air HFA / albuterol sulfate   []  Pro Air Respiclick /albuterol sulfate inhalation powder    [x]  Proventil HFA / albuterol sulfate    []  Ventolin HFA / albuterol sulfate    []  Xopenex HFA/ l evalbuterol tartrate     PAM/PINKY Short acting muscarinic antagonist and Short acting                 Beta2-agonist bronchodilators   []  Combivent Respimat / ipratropium bromide and albuterol    LAMA Long acting muscarinic antagonist   []  Oneta Houston / Glycopyrrolate inhalation powder    []  Incruse Ellipta / Umeclidinum inhalation powder    []  Spiriva HandiHaler / tiotropium bromide inhalation powder    [x]  Spiriva Respimat / tiotropium bromide    []  Kendal Larch / aclidinium bromide inhalation powder    LABA- Long-acting beta2-agonist bronchodilators     []  Harman Rile / Indacaterol inhalation powder    []  Serevent Diskus / Salmeterol xinafoate inhalation powder    []  Stiverdi Respimat / Olodaterol hydrochloride    LAMA/ LABA Long acting muscarinic antagonist and Long-acting beta2-agonist bronchodilators   []  Anoro Ellipta / Umeclidinium and vilanterol inhalation powder    []  Anna Asiya / Gycopyrrolate and formoterol fumarate                                inhalation aerosol;     []  Stiolto Respimat / Tiotropium bromide and olodaterol    []  Utibron Neohaler / Indacaterol and glycopyrrolate inhalation powder    ICS Inhaled Corticosteroids (Steroids- rinse mouth)   []   Aerospan Diskus / Fluticasone propionate and salmeterol inhalation        powder;    [x]  Advair HFA / Fluticasone propionate and salmeterol xinafoate  []  Arnuity Ellipta/ Fluticasone furoate inhalation powder    []  Asmanex HFA / Mometasone furoate    []  AsmanexTwisthaler / Mometasone furoate inhalation powder    []  Flovent Diskus / Fluticasone propionate inhalation powder     []  Flovent HFA / Fluticasone propionate    []  Pulmicort FlexHaler / Budesonide inhalation powder    []  QVAR (HFA) / Beclomethasome dipropionate    ICS/LABA Inhaled Corticosteroids and Long-acting beta2-agonist bronchodilators   []  Advair Diskus / Fluticasone propionate and salmeterol inhalation                           powder     []  Advair HFA / fluticasone propionate and salmeterol xinaoate    []  Breo Ellipta / fluticasone furoate and vilanterol inhalation powder    []  Dulera / mometasone furoate and formoterol fumarate dehydrate   []  Symbicort (HFA) / budesonide and formoterol fumarate dehydrate    PDE-4 inhibitor    [x]  Deliresp / Roflumilast    Disposition of patient:  Home      Services:  [address] and Contact information: Geri [place] @ [phone]    Patient has a PCA that comes in 6 hrs for 5 x week to assist     DME: O2:  nebulizer: CPAP; etc.  DME Company and Contact Information: [employer] [facility] NIV and [employer] for oxygen     Social support: Family     Does patient have an Advance Directive:  not on file     Advance Directive scanned into patients chart:  no    NN routed chart and staff message to Dr. Rj Carlos Attends follow-up appointments as directed. Target Date:  7/16/19      Plan:7/3/19  NN will Remind patient of upcoming appointment and verify transportation    7/9/19 @ 8:20 AM    [facility]   7/16/2019 1:45 PM Maya Galloway MD [place] [phone] [address]     7/3/19 NN reminded patient of upcoming appointments.  Improve activity tolerance and quality of life (ie. identify issue such as depression)Target  Date:  10/3/19      Plan:  7/3/19  Patient will adhere to Medicatiion regimen  Patient will avoid triggers to exacerbation   Patient will participate in Pulmonary Rehab before end of episode  NN will monitor patient and educated as needed. 7/3/19 NN educated patient on pulmonary rehab/alternating rest and activity          Prevent complications post hospitalization. Target Date:  8/3/19      Plan:  7/3/19  Educate on red flags and reinforce recent d/c instructions     7/3/19 NN educated patient on COPD Red flags to report            Plan: NN will perform outreach weekly x 4 , every 2 weeks x 2 , monthly x 1 or as needed     Patient reminded that there are physicians on call 24 hours a day / 7 days a week (M-F 5pm to 8am and from Friday 5pm until Monday 8a for the weekend) should the patient have questions or concerns.   Patient reminded to call 911 if situation is emergent or patient feels the situation is emergent. Pt verbalizes understanding.

## 2019-07-06 ENCOUNTER — HOME CARE VISIT (OUTPATIENT)
Dept: HOME HEALTH SERVICES | Facility: HOME HEALTH | Age: 59
End: 2019-07-06

## 2019-07-08 ENCOUNTER — PATIENT OUTREACH (OUTPATIENT)
Dept: PULMONOLOGY | Age: 59
End: 2019-07-08

## 2019-07-08 NOTE — PROGRESS NOTES
Nils Limon with [employer] dme co. Call to give writer update on patient's NIV order and to request MD notes. 2 patient identifiers given. NN pulled up Dr. Yasemin Beltre  note from 6/6/19 and faxed them to # given by Nils Limon with confirmation sheet noted as going through.

## 2019-07-10 ENCOUNTER — HOME CARE VISIT (OUTPATIENT)
Dept: SCHEDULING | Facility: HOME HEALTH | Age: 59
End: 2019-07-10

## 2019-07-10 PROCEDURE — G0299 HHS/HOSPICE OF RN EA 15 MIN: HCPCS

## 2019-07-11 ENCOUNTER — TELEPHONE (OUTPATIENT)
Dept: PULMONOLOGY | Age: 59
End: 2019-07-11

## 2019-07-11 NOTE — TELEPHONE ENCOUNTER
Per Noah Bustos from [facility], she received orders for a Trilogy and O2 for pt. She has several clinical questions. Please call [phone] ext [phone].

## 2019-07-12 ENCOUNTER — TELEPHONE (OUTPATIENT)
Dept: PULMONOLOGY | Age: 59
End: 2019-07-12

## 2019-07-12 NOTE — TELEPHONE ENCOUNTER
Please call Fuentes Campbell from [address] [place] [address] ext [phone]. She has some questions.

## 2019-07-12 NOTE — TELEPHONE ENCOUNTER
César Gr with [employer] needs support notes for the NIV ordered from all hospitalizations and ER visits for COPD exacerbations since 1/19.  All faxed [phone]

## 2019-07-14 ENCOUNTER — HOSPITAL ENCOUNTER (EMERGENCY)
Age: 59
Discharge: HOME | End: 2019-07-14
Attending: EMERGENCY MEDICINE
Payer: MEDICARE

## 2019-07-14 ENCOUNTER — APPOINTMENT (OUTPATIENT)
Dept: CT IMAGING | Age: 59
End: 2019-07-14
Attending: EMERGENCY MEDICINE
Payer: MEDICARE

## 2019-07-14 VITALS
OXYGEN SATURATION: 99 % | HEIGHT: 63 IN | TEMPERATURE: 97.7 F | DIASTOLIC BLOOD PRESSURE: 57 MMHG | SYSTOLIC BLOOD PRESSURE: 148 MMHG | BODY MASS INDEX: 43.94 KG/M2 | WEIGHT: 248 LBS | HEART RATE: 71 BPM | RESPIRATION RATE: 20 BRPM

## 2019-07-14 DIAGNOSIS — R11.0 NAUSEA WITHOUT VOMITING: ICD-10-CM

## 2019-07-14 DIAGNOSIS — R10.33 PERIUMBILICAL ABDOMINAL PAIN: ICD-10-CM

## 2019-07-14 DIAGNOSIS — J44.1 ACUTE EXACERBATION OF CHRONIC OBSTRUCTIVE PULMONARY DISEASE (COPD) (HCC): Primary | ICD-10-CM

## 2019-07-14 LAB
ALBUMIN SERPL-MCNC: 3.8 G/DL (ref 3.4–5)
ALBUMIN/GLOB SERPL: 1 {RATIO} (ref 0.8–1.7)
ALP SERPL-CCNC: 95 U/L (ref 45–117)
ALT SERPL-CCNC: 50 U/L (ref 13–56)
ANION GAP SERPL CALC-SCNC: 7 MMOL/L (ref 3–18)
APPEARANCE UR: CLEAR
AST SERPL-CCNC: 25 U/L (ref 15–37)
ATRIAL RATE: 73 BPM
BASOPHILS # BLD: 0 K/UL (ref 0–0.1)
BASOPHILS NFR BLD: 0 % (ref 0–2)
BILIRUB SERPL-MCNC: 0.5 MG/DL (ref 0.2–1)
BILIRUB UR QL: NEGATIVE
BUN SERPL-MCNC: 13 MG/DL (ref 7–18)
BUN/CREAT SERPL: 14 (ref 12–20)
CALCIUM SERPL-MCNC: 9.1 MG/DL (ref 8.5–10.1)
CALCULATED P AXIS, ECG09: 49 DEGREES
CALCULATED R AXIS, ECG10: 49 DEGREES
CALCULATED T AXIS, ECG11: 55 DEGREES
CHLORIDE SERPL-SCNC: 105 MMOL/L (ref 100–108)
CO2 SERPL-SCNC: 28 MMOL/L (ref 21–32)
COLOR UR: YELLOW
CREAT SERPL-MCNC: 0.94 MG/DL (ref 0.6–1.3)
DIAGNOSIS, 93000: NORMAL
DIFFERENTIAL METHOD BLD: ABNORMAL
EOSINOPHIL # BLD: 0.2 K/UL (ref 0–0.4)
EOSINOPHIL NFR BLD: 3 % (ref 0–5)
ERYTHROCYTE [DISTWIDTH] IN BLOOD BY AUTOMATED COUNT: 13.5 % (ref 11.6–14.5)
GLOBULIN SER CALC-MCNC: 3.8 G/DL (ref 2–4)
GLUCOSE BLD STRIP.AUTO-MCNC: 254 MG/DL (ref 70–110)
GLUCOSE SERPL-MCNC: 262 MG/DL (ref 74–99)
GLUCOSE UR STRIP.AUTO-MCNC: >1000 MG/DL
HCT VFR BLD AUTO: 40.3 % (ref 35–45)
HGB BLD-MCNC: 13.7 G/DL (ref 12–16)
HGB UR QL STRIP: NEGATIVE
KETONES UR QL STRIP.AUTO: NEGATIVE MG/DL
LEUKOCYTE ESTERASE UR QL STRIP.AUTO: NEGATIVE
LIPASE SERPL-CCNC: 90 U/L (ref 73–393)
LYMPHOCYTES # BLD: 2.1 K/UL (ref 0.9–3.6)
LYMPHOCYTES NFR BLD: 28 % (ref 21–52)
MCH RBC QN AUTO: 30.2 PG (ref 24–34)
MCHC RBC AUTO-ENTMCNC: 34 G/DL (ref 31–37)
MCV RBC AUTO: 88.8 FL (ref 74–97)
MONOCYTES # BLD: 0.6 K/UL (ref 0.05–1.2)
MONOCYTES NFR BLD: 8 % (ref 3–10)
NEUTS SEG # BLD: 4.6 K/UL (ref 1.8–8)
NEUTS SEG NFR BLD: 61 % (ref 40–73)
NITRITE UR QL STRIP.AUTO: NEGATIVE
P-R INTERVAL, ECG05: 156 MS
PH UR STRIP: 5 [PH] (ref 5–8)
PLATELET # BLD AUTO: 261 K/UL (ref 135–420)
PMV BLD AUTO: 9.1 FL (ref 9.2–11.8)
POTASSIUM SERPL-SCNC: 4.3 MMOL/L (ref 3.5–5.5)
PROT SERPL-MCNC: 7.6 G/DL (ref 6.4–8.2)
PROT UR STRIP-MCNC: NEGATIVE MG/DL
Q-T INTERVAL, ECG07: 396 MS
QRS DURATION, ECG06: 84 MS
QTC CALCULATION (BEZET), ECG08: 436 MS
RBC # BLD AUTO: 4.54 M/UL (ref 4.2–5.3)
SODIUM SERPL-SCNC: 140 MMOL/L (ref 136–145)
SP GR UR REFRACTOMETRY: >1.03 (ref 1–1.03)
TROPONIN I SERPL-MCNC: <0.02 NG/ML (ref 0–0.04)
UROBILINOGEN UR QL STRIP.AUTO: 1 EU/DL (ref 0.2–1)
VENTRICULAR RATE, ECG03: 73 BPM
WBC # BLD AUTO: 7.5 K/UL (ref 4.6–13.2)

## 2019-07-14 PROCEDURE — 74011000250 HC RX REV CODE- 250: Performed by: EMERGENCY MEDICINE

## 2019-07-14 PROCEDURE — 99285 EMERGENCY DEPT VISIT HI MDM: CPT

## 2019-07-14 PROCEDURE — 74011250637 HC RX REV CODE- 250/637: Performed by: EMERGENCY MEDICINE

## 2019-07-14 PROCEDURE — 83690 ASSAY OF LIPASE: CPT

## 2019-07-14 PROCEDURE — 81003 URINALYSIS AUTO W/O SCOPE: CPT

## 2019-07-14 PROCEDURE — C9113 INJ PANTOPRAZOLE SODIUM, VIA: HCPCS | Performed by: EMERGENCY MEDICINE

## 2019-07-14 PROCEDURE — 93005 ELECTROCARDIOGRAM TRACING: CPT

## 2019-07-14 PROCEDURE — 82962 GLUCOSE BLOOD TEST: CPT

## 2019-07-14 PROCEDURE — 74176 CT ABD & PELVIS W/O CONTRAST: CPT

## 2019-07-14 PROCEDURE — 80053 COMPREHEN METABOLIC PANEL: CPT

## 2019-07-14 PROCEDURE — 94640 AIRWAY INHALATION TREATMENT: CPT

## 2019-07-14 PROCEDURE — 85025 COMPLETE CBC W/AUTO DIFF WBC: CPT

## 2019-07-14 PROCEDURE — 84484 ASSAY OF TROPONIN QUANT: CPT

## 2019-07-14 PROCEDURE — 74011250636 HC RX REV CODE- 250/636: Performed by: EMERGENCY MEDICINE

## 2019-07-14 PROCEDURE — 77030029684 HC NEB SM VOL KT MONA -A

## 2019-07-14 RX ORDER — FENTANYL CITRATE 50 UG/ML
50 INJECTION, SOLUTION INTRAMUSCULAR; INTRAVENOUS
Status: DISCONTINUED | OUTPATIENT
Start: 2019-07-14 | End: 2019-07-14 | Stop reason: HOSPADM

## 2019-07-14 RX ORDER — ACETAMINOPHEN 500 MG
1000 TABLET ORAL
Status: COMPLETED | OUTPATIENT
Start: 2019-07-14 | End: 2019-07-14

## 2019-07-14 RX ORDER — ONDANSETRON 8 MG/1
8 TABLET, ORALLY DISINTEGRATING ORAL
Qty: 12 TAB | Refills: 0 | Status: SHIPPED | OUTPATIENT
Start: 2019-07-14 | End: 2019-08-31

## 2019-07-14 RX ORDER — DICYCLOMINE HYDROCHLORIDE 10 MG/1
10 CAPSULE ORAL 4 TIMES DAILY
Qty: 20 CAP | Refills: 0 | Status: SHIPPED | OUTPATIENT
Start: 2019-07-14 | End: 2019-07-19

## 2019-07-14 RX ORDER — SODIUM CHLORIDE 9 MG/ML
100 INJECTION, SOLUTION INTRAVENOUS CONTINUOUS
Status: DISCONTINUED | OUTPATIENT
Start: 2019-07-14 | End: 2019-07-14 | Stop reason: HOSPADM

## 2019-07-14 RX ORDER — ONDANSETRON 8 MG/1
8 TABLET, ORALLY DISINTEGRATING ORAL
Status: COMPLETED | OUTPATIENT
Start: 2019-07-14 | End: 2019-07-14

## 2019-07-14 RX ORDER — ALBUTEROL SULFATE 0.83 MG/ML
5 SOLUTION RESPIRATORY (INHALATION)
Status: COMPLETED | OUTPATIENT
Start: 2019-07-14 | End: 2019-07-14

## 2019-07-14 RX ORDER — DICYCLOMINE HYDROCHLORIDE 10 MG/1
10 CAPSULE ORAL
Status: COMPLETED | OUTPATIENT
Start: 2019-07-14 | End: 2019-07-14

## 2019-07-14 RX ORDER — ONDANSETRON 2 MG/ML
4 INJECTION INTRAMUSCULAR; INTRAVENOUS
Status: DISCONTINUED | OUTPATIENT
Start: 2019-07-14 | End: 2019-07-14 | Stop reason: HOSPADM

## 2019-07-14 RX ORDER — PANTOPRAZOLE SODIUM 40 MG/1
40 TABLET, DELAYED RELEASE ORAL
Status: COMPLETED | OUTPATIENT
Start: 2019-07-14 | End: 2019-07-14

## 2019-07-14 RX ORDER — IPRATROPIUM BROMIDE AND ALBUTEROL SULFATE 2.5; .5 MG/3ML; MG/3ML
3 SOLUTION RESPIRATORY (INHALATION) ONCE
Status: COMPLETED | OUTPATIENT
Start: 2019-07-14 | End: 2019-07-14

## 2019-07-14 RX ADMIN — ACETAMINOPHEN 1000 MG: 500 TABLET ORAL at 09:37

## 2019-07-14 RX ADMIN — ONDANSETRON 8 MG: 8 TABLET, ORALLY DISINTEGRATING ORAL at 09:37

## 2019-07-14 RX ADMIN — SODIUM CHLORIDE 100 ML/HR: 900 INJECTION, SOLUTION INTRAVENOUS at 09:11

## 2019-07-14 RX ADMIN — DICYCLOMINE HYDROCHLORIDE 10 MG: 10 CAPSULE ORAL at 11:05

## 2019-07-14 RX ADMIN — PANTOPRAZOLE SODIUM 40 MG: 40 TABLET, DELAYED RELEASE ORAL at 09:37

## 2019-07-14 RX ADMIN — IPRATROPIUM BROMIDE AND ALBUTEROL SULFATE 3 ML: .5; 3 SOLUTION RESPIRATORY (INHALATION) at 10:48

## 2019-07-14 RX ADMIN — ALBUTEROL SULFATE 5 MG: 2.5 SOLUTION RESPIRATORY (INHALATION) at 10:48

## 2019-07-14 NOTE — DISCHARGE INSTRUCTIONS
Patient Education        Abdominal Pain: Care Instructions  Your Care Instructions    Abdominal pain has many possible causes. Some aren't serious and get better on their own in a few days. Others need more testing and treatment. If your pain continues or gets worse, you need to be rechecked and may need more tests to find out what is wrong. You may need surgery to correct the problem. Don't ignore new symptoms, such as fever, nausea and vomiting, urination problems, pain that gets worse, and dizziness. These may be signs of a more serious problem. Your doctor may have recommended a follow-up visit in the next 8 to 12 hours. If you are not getting better, you may need more tests or treatment. The doctor has checked you carefully, but problems can develop later. If you notice any problems or new symptoms, get medical treatment right away. Follow-up care is a key part of your treatment and safety. Be sure to make and go to all appointments, and call your doctor if you are having problems. It's also a good idea to know your test results and keep a list of the medicines you take. How can you care for yourself at home? · Rest until you feel better. · To prevent dehydration, drink plenty of fluids, enough so that your urine is light yellow or clear like water. Choose water and other caffeine-free clear liquids until you feel better. If you have kidney, heart, or liver disease and have to limit fluids, talk with your doctor before you increase the amount of fluids you drink. · If your stomach is upset, eat mild foods, such as rice, dry toast or crackers, bananas, and applesauce. Try eating several small meals instead of two or three large ones. · Wait until 48 hours after all symptoms have gone away before you have spicy foods, alcohol, and drinks that contain caffeine. · Do not eat foods that are high in fat. · Avoid anti-inflammatory medicines such as aspirin, ibuprofen (Advil, Motrin), and naproxen (Aleve). These can cause stomach upset. Talk to your doctor if you take daily aspirin for another health problem. When should you call for help? Call 911 anytime you think you may need emergency care. For example, call if:    · You passed out (lost consciousness).     · You pass maroon or very bloody stools.     · You vomit blood or what looks like coffee grounds.     · You have new, severe belly pain.    Call your doctor now or seek immediate medical care if:    · Your pain gets worse, especially if it becomes focused in one area of your belly.     · You have a new or higher fever.     · Your stools are black and look like tar, or they have streaks of blood.     · You have unexpected vaginal bleeding.     · You have symptoms of a urinary tract infection. These may include:  ? Pain when you urinate. ? Urinating more often than usual.  ? Blood in your urine.     · You are dizzy or lightheaded, or you feel like you may faint.    Watch closely for changes in your health, and be sure to contact your doctor if:    · You are not getting better after 1 day (24 hours). Where can you learn more? Go to http://etelvinaEasy Vinoolivier.info/. Enter E121 in the search box to learn more about \"Abdominal Pain: Care Instructions. \"  Current as of: September 23, 2018  Content Version: 11.9  © 5278-0825 [employer]. Care instructions adapted under license by [employer] (which disclaims liability or warranty for this information). If you have questions about a medical condition or this instruction, always ask your healthcare professional. [place] [id number] any warranty or liability for your use of this information. Patient Education        COPD Exacerbation Plan: Care Instructions  Your Care Instructions    If you have chronic obstructive pulmonary disease (COPD), your usual shortness of breath could suddenly get worse. You may start coughing more and have more mucus.  This flare-up is called a COPD exacerbation (say \"yu-BHW-ne-BAY-shun\"). A lung infection or air pollution could set off an exacerbation. Sometimes it can happen after a quick change in temperature or being around chemicals. Work with your doctor to make a plan for dealing with an exacerbation. You can better manage it if you plan ahead. Follow-up care is a key part of your treatment and safety. Be sure to make and go to all appointments, and call your doctor if you are having problems. It's also a good idea to know your test results and keep a list of the medicines you take. How can you care for yourself at home? During an exacerbation  · Do not panic if you start to have one. Quick treatment at home may help you prevent serious breathing problems. If you have a COPD exacerbation plan that you developed with your doctor, follow it. · Take your medicines exactly as your doctor tells you.  ? Use your inhaler as directed by your doctor. If your symptoms do not get better after you use your medicine, have someone take you to the emergency room. Call an ambulance if necessary. ? With inhaled medicines, a spacer or a nebulizer may help you get more medicine to your lungs. Ask your doctor or pharmacist how to use them properly. Practice using the spacer in front of a mirror before you have an exacerbation. This may help you get the medicine into your lungs quickly. ? If your doctor has given you steroid pills, take them as directed. ? Your doctor may have given you a prescription for antibiotics, which you can fill if you need it. ? Talk to your doctor if you have any problems with your medicine. And call your doctor if you have to use your antibiotic or steroid pills. Preventing an exacerbation  · Do not smoke. This is the most important step you can take to prevent more damage to your lungs and prevent problems. If you already smoke, it is never too late to stop.  If you need help quitting, talk to your doctor about stop-smoking programs and medicines. These can increase your chances of quitting for good. · Take your daily medicines as prescribed. · Avoid colds and flu. ? Get a pneumococcal vaccine. ? Get a flu vaccine each year, as soon as it is available. Ask those you live or work with to do the same, so they will not get the flu and infect you. ? Try to stay away from people with colds or the flu. ? Wash your hands often. · Avoid secondhand smoke; air pollution; cold, dry air; hot, humid air; and high altitudes. Stay at home with your windows closed when air pollution is bad. · Learn breathing techniques for COPD, such as breathing through pursed lips. These techniques can help you breathe easier during an exacerbation. When should you call for help? Call 911 anytime you think you may need emergency care. For example, call if:    · You have severe trouble breathing.     · You have severe chest pain.    Call your doctor now or seek immediate medical care if:    · You have new or worse shortness of breath.     · You develop new chest pain.     · You are coughing more deeply or more often, especially if you notice more mucus or a change in the color of your mucus.     · You cough up blood.     · You have new or increased swelling in your legs or belly.     · You have a fever.    Watch closely for changes in your health, and be sure to contact your doctor if:    · You need to use your antibiotic or steroid pills.     · Your symptoms are getting worse. Where can you learn more? Go to http://etelvina-olivier.info/. Enter U995 in the search box to learn more about \"COPD Exacerbation Plan: Care Instructions. \"  Current as of: September 5, 2018  Content Version: 11.9  © 2989-1026 [employer]. Care instructions adapted under license by [employer] (which disclaims liability or warranty for this information).  If you have questions about a medical condition or this instruction, always ask your healthcare professional. [place] [address] any warranty or liability for your use of this information.

## 2019-07-14 NOTE — ED PROVIDER NOTES
EMERGENCY DEPARTMENT HISTORY AND PHYSICAL EXAM    Date: 7/14/2019  Patient Name: Kay Frost    History of Presenting Illness     No chief complaint on file. History Provided By: Patient      Additional History (Context): Kay Frost is a 60 y.o. female with diabetes, hypertension, hyperlipidemia, obesity, COPD and Prior appendectomy and cholecystectomy, GERD who presents with right-sided abdominal pain since this morning. Feels nauseated but has not vomited. Is having regular bowel movements. Has not one had today. Denies fever or dysuria. Denies history of hepatitis cirrhosis or pancreatitis. Patient says she had a swallow study done once by GI for GERD/gastritis but this was several years ago and is not actively followed by GI. Denies chest pain shortness of breath. Denies melena or hematochezia. Has not taken her blood pressure medications yet this morning. PCP: Tex Pham MD    Current Facility-Administered Medications   Medication Dose Route Frequency Provider Last Rate Last Dose    0.9% sodium chloride infusion  100 mL/hr IntraVENous CONTINUOUS Britt Nathan PA   Stopped at 07/14/19 0938    ondansetron (ZOFRAN) injection 4 mg  4 mg IntraVENous NOW Britt Nathan PA   Stopped at 07/14/19 0912    pantoprazole (PROTONIX) 40 mg in sodium chloride 0.9% 10 mL injection  40 mg IntraVENous ONCE Britt Nathan PA        fentaNYL citrate (PF) injection 50 mcg  50 mcg IntraVENous NOW Britt Nathan PA        dicyclomine (BENTYL) capsule 10 mg  10 mg Oral NOW Britt Nathan PA         Current Outpatient Medications   Medication Sig Dispense Refill    ondansetron (ZOFRAN ODT) 8 mg disintegrating tablet Take 1 Tab by mouth every eight (8) hours as needed for Nausea. 12 Tab 0    dicyclomine (BENTYL) 10 mg capsule Take 1 Cap by mouth four (4) times daily for 5 days. 20 Cap 0    lisinopril (PRINIVIL, ZESTRIL) 20 mg tablet Take 20 mg by mouth two (2) times a day.  albuterol sulfate (PROVENTIL;VENTOLIN) 2.5 mg/0.5 mL nebu nebulizer solution 0.5 mL by Nebulization route four (4) times daily as needed for Wheezing. To be used with HyperSal nebulizer solution. ICD code: COPD J44.1 60 mL 3    predniSONE (DELTASONE) 10 mg tablet Take 60 mg by mouth daily for 1 day, THEN 50 mg daily for 3 days, THEN 40 mg daily for 3 days, THEN 30 mg daily for 3 days, THEN 20 mg daily for 3 days, THEN 10 mg every other day for 10 days. 1 tablet with breakfast every other morning 53 Tab 0    cetirizine (ZYRTEC) 10 mg tablet Take  by mouth daily as needed for Allergies or Rhinitis.  fluticasone propionate (FLONASE ALLERGY RELIEF) 50 mcg/actuation nasal spray 2 Sprays by Both Nostrils route daily.  fluticasone propion-salmeterol (ADVAIR HFA) 230-21 mcg/actuation inhaler Take 2 Puffs by inhalation two (2) times a day.  hydroCHLOROthiazide (HYDRODIURIL) 12.5 mg tablet Take 12.5 mg by mouth daily.  tiotropium bromide (SPIRIVA RESPIMAT) 2.5 mcg/actuation inhaler Take 2 Puffs by inhalation daily.  insulin glargine (LANTUS,BASAGLAR) 100 unit/mL (3 mL) inpn 35 Units by SubCUTAneous route nightly. (Patient taking differently: 30 Units by SubCUTAneous route nightly.) 28 Units 0    albuterol sulfate 90 mcg/actuation aepb Take 1 Puff by inhalation every four (4) hours. 1 Inhaler 0    NOVOLOG FLEXPEN U-100 INSULIN 100 unit/mL inpn Continue home Sliding scale insulin as prior to admission (Patient taking differently: 1 Units by SubCUTAneous route three (3) times daily. If BG <100=0u  101-150=5u  151-250=8u  251-300=12u  >300 call MD  ) 1 Pen 0    omeprazole (PRILOSEC) 40 mg capsule Take 40 mg by mouth daily. Indications: gastroesophageal reflux disease      lisinopril (PRINIVIL, ZESTRIL) 40 mg tablet Take 20 mg by mouth two (2) times a day.  Indications: high blood pressure      simethicone (MYLICON) 80 mg chewable tablet Take 80 mg by mouth every six (6) hours as needed for Flatulence.  cpap machine kit by Does Not Apply route nightly. M.E.D.      OXYGEN-AIR DELIVERY SYSTEMS 2 L by Nasal route continuous. [facility]      aspirin delayed-release 81 mg tablet Take 81 mg by mouth daily.  famotidine (PEPCID) 20 mg tablet Take 20 mg by mouth two (2) times daily as needed for Other (reflux).  montelukast (SINGULAIR) 10 mg tablet Take 10 mg by mouth nightly.          Past History     Past Medical History:  Past Medical History:   Diagnosis Date    Asthma     Chronic lung disease     COPD     Cystocele, midline     Diabetes mellitus ([address]     GERD (gastroesophageal reflux disease)     Hidradenitis suppurativa     Hyperlipidemia     Hypertension     SHERRIE on CPAP     CPAP    Stress incontinence        Past Surgical History:  Past Surgical History:   Procedure Laterality Date    BREAST SURGERY PROCEDURE UNLISTED      Right breast benign tumor removal    HX APPENDECTOMY      HX CHOLECYSTECTOMY      HX DILATION AND CURETTAGE      Dysfunctional uterine bleeding, thought 2/2 fibroids    HX TUBAL LIGATION         Family History:  Family History   Problem Relation Age of Onset    Hypertension Mother     Stroke Mother     Breast Cancer Mother         Bilateral mastectomies    Cancer Mother         ovarian and breast    Heart Failure Mother     Heart Attack Father         2011    Heart Surgery Father         CABG    Heart Failure Father     COPD Sister         Heavy smoker    Cancer Sister         ovarian    Heart Failure Sister     Lung Disease Sister     Asthma Child     Cancer Maternal Aunt         pancreatic    Cancer Maternal Grandfather         stomach       Social History:  Social History     Tobacco Use    Smoking status: Former Smoker     Packs/day: 1.00     Years: 30.00     Pack years: 30.00     Types: Cigarettes     Start date: 1966     Last attempt to quit: 2006     Years since quittin.8    Smokeless tobacco: Never Used   Ewelina Antunez Tobacco comment: 1-1.5 packs per day   Substance Use Topics    Alcohol use: No    Drug use: No       Allergies: Allergies   Allergen Reactions    Ancef [Cefazolin] Hives    Contrast Agent [Iodine] Anaphylaxis, Shortness of Breath and Swelling     Needs pre-medication for IV contrast with Benadryl, Solu-Medrol    Fish Containing Products Anaphylaxis     Pt states she had a severe allergic reaction at 9 y/o.  Metformin Other (comments)     Abdominal pain, diarrhea.  Codeine Other (comments)     Altered mental status         Review of Systems   Review of Systems   Constitutional: Negative for fever. Gastrointestinal: Positive for abdominal pain and nausea. Negative for blood in stool, constipation, diarrhea and vomiting. Genitourinary: Negative for dysuria and flank pain. All Other Systems Negative  Physical Exam     Vitals:    07/14/19 0830 07/14/19 0931 07/14/19 0953 07/14/19 1048   BP: 194/87   148/57   Pulse: 81 78  71   Resp: 20 20     Temp:       SpO2: 100% 98% 99%    Weight:       Height:         Physical Exam   Constitutional: She is oriented to person, place, and time. She appears well-developed. She appears distressed. HENT:   Head: Normocephalic and atraumatic. Eyes: Pupils are equal, round, and reactive to light. Neck: No JVD present. No tracheal deviation present. No thyromegaly present. Cardiovascular: Normal rate, regular rhythm, normal heart sounds and intact distal pulses. Exam reveals no gallop and no friction rub. No murmur heard. DP and PT pulses palpable bilat. Pulmonary/Chest: Effort normal. No stridor. No respiratory distress. She has wheezes. She has no rales. She exhibits no tenderness. Abdominal: Soft. She exhibits no distension and no mass. There is tenderness. There is no rebound and no guarding. Epigastric and right lower quadrant tenderness to palpation. Musculoskeletal: She exhibits no edema or tenderness.    Lymphadenopathy:     She has no cervical adenopathy. Neurological: She is alert and oriented to person, place, and time. Skin: Skin is warm and dry. No rash noted. No erythema. No pallor. Psychiatric: She has a normal mood and affect. Her behavior is normal. Thought content normal.   Nursing note and vitals reviewed. Diagnostic Study Results     Labs -     Recent Results (from the past 12 hour(s))   GLUCOSE, POC    Collection Time: 07/14/19  8:05 AM   Result Value Ref Range    Glucose (POC) 254 (H) 70 - 110 mg/dL   EKG, 12 LEAD, INITIAL    Collection Time: 07/14/19  8:15 AM   Result Value Ref Range    Ventricular Rate 73 BPM    Atrial Rate 73 BPM    P-R Interval 156 ms    QRS Duration 84 ms    Q-T Interval 396 ms    QTC Calculation (Bezet) 436 ms    Calculated P Axis 49 degrees    Calculated R Axis 49 degrees    Calculated T Axis 55 degrees    Diagnosis       Normal sinus rhythm  Normal ECG  When compared with ECG of 29-JUN-2019 12:36,  Borderline criteria for Inferior infarct are no longer present     METABOLIC PANEL, COMPREHENSIVE    Collection Time: 07/14/19  8:27 AM   Result Value Ref Range    Sodium 140 136 - 145 mmol/L    Potassium 4.3 3.5 - 5.5 mmol/L    Chloride 105 100 - 108 mmol/L    CO2 28 21 - 32 mmol/L    Anion gap 7 3.0 - 18 mmol/L    Glucose 262 (H) 74 - 99 mg/dL    BUN 13 7.0 - 18 MG/DL    Creatinine 0.94 0.6 - 1.3 MG/DL    BUN/Creatinine ratio 14 12 - 20      GFR est AA >60 >60 ml/min/1.73m2    GFR est non-AA >60 >60 ml/min/1.73m2    Calcium 9.1 8.5 - 10.1 MG/DL    Bilirubin, total 0.5 0.2 - 1.0 MG/DL    ALT (SGPT) 50 13 - 56 U/L    AST (SGOT) 25 15 - 37 U/L    Alk.  phosphatase 95 45 - 117 U/L    Protein, total 7.6 6.4 - 8.2 g/dL    Albumin 3.8 3.4 - 5.0 g/dL    Globulin 3.8 2.0 - 4.0 g/dL    A-G Ratio 1.0 0.8 - 1.7     LIPASE    Collection Time: 07/14/19  8:27 AM   Result Value Ref Range    Lipase 90 73 - 393 U/L   CBC WITH AUTOMATED DIFF    Collection Time: 07/14/19  8:27 AM   Result Value Ref Range    WBC 7.5 4.6 - 13.2 K/uL    RBC 4.54 4.20 - 5.30 M/uL    HGB 13.7 12.0 - 16.0 g/dL    HCT 40.3 35.0 - 45.0 %    MCV 88.8 74.0 - 97.0 FL    MCH 30.2 24.0 - 34.0 PG    MCHC 34.0 31.0 - 37.0 g/dL    RDW 13.5 11.6 - 14.5 %    PLATELET 570 863 - 916 K/uL    MPV 9.1 (L) 9.2 - 11.8 FL    NEUTROPHILS 61 40 - 73 %    LYMPHOCYTES 28 21 - 52 %    MONOCYTES 8 3 - 10 %    EOSINOPHILS 3 0 - 5 %    BASOPHILS 0 0 - 2 %    ABS. NEUTROPHILS 4.6 1.8 - 8.0 K/UL    ABS. LYMPHOCYTES 2.1 0.9 - 3.6 K/UL    ABS. MONOCYTES 0.6 0.05 - 1.2 K/UL    ABS. EOSINOPHILS 0.2 0.0 - 0.4 K/UL    ABS. BASOPHILS 0.0 0.0 - 0.1 K/UL    DF AUTOMATED     TROPONIN I    Collection Time: 07/14/19  8:27 AM   Result Value Ref Range    Troponin-I, QT <0.02 0.0 - 0.045 NG/ML   URINALYSIS W/ RFLX MICROSCOPIC    Collection Time: 07/14/19  9:24 AM   Result Value Ref Range    Color YELLOW      Appearance CLEAR      Specific gravity >1.030 (H) 1.005 - 1.030    pH (UA) 5.0 5.0 - 8.0      Protein NEGATIVE  NEG mg/dL    Glucose >1,000 (A) NEG mg/dL    Ketone NEGATIVE  NEG mg/dL    Bilirubin NEGATIVE  NEG      Blood NEGATIVE  NEG      Urobilinogen 1.0 0.2 - 1.0 EU/dL    Nitrites NEGATIVE  NEG      Leukocyte Esterase NEGATIVE  NEG         Radiologic Studies -   CT ABD PELV WO CONT   Final Result   IMPRESSION:      1. Umbilical hernia with single loop of small bowel as well as omental fat   present within the defect without evidence of obstruction is measured above. 2.   Medical renal disease. 3. Cholecystectomy. 4. Hepatic steatosis. Follow-up with liver ultrasound and LFTs is recommended. .        CT Results  (Last 48 hours)               07/14/19 0856  CT ABD PELV WO CONT Final result    Impression:  IMPRESSION:       1. Umbilical hernia with single loop of small bowel as well as omental fat   present within the defect without evidence of obstruction is measured above. 2.   Medical renal disease. 3. Cholecystectomy. 4. Hepatic steatosis.  Follow-up with liver ultrasound and LFTs is recommended. .       Narrative:  HISTORY: Right-sided abdominal pain since this morning. Della Robertson EXAM: CT abdomen and pelvis. TECHNIQUE: Spiral images of the abdomen and pelvis were performed according to   routine protocol without intravenous contrast. Coronal and sagittal   reconstructions were provided for evaluation. Oral contrast  was not given. COMPARISON: None. All CT scans at this facility are performed using dose optimization technique as   appropriate to a performed exam, to include automated exposure control,   adjustment of the mA and/or kV according to patient size (including appropriate   matching for site-specific examinations), or use of iterative reconstruction   technique. ABDOMEN FINDINGS: Limited evaluation of abdomen and pelvis given lack of oral   and intravenous contrast.       Emphysematous changes noted bilaterally. Liver: Hepatic steatosis. Limited evaluation. Gallbladder: Has been removed. No biliary ductal dilatation. Pancreas: Normal attenuation without mass or ductal dilatation. Spleen: [Unremarkable.]       Adrenal Glands: Unremarkable. Kidneys:        Cortical renal thinning bilaterally. No hydronephrosis or hydroureter. No   nephrolithiasis. Lymph Nodes: No large adenopathy. Aorta:   Grossly unremarkable       PELVIS FINDINGS:        Bowel: Small Bowel: Normal in caliber with normal wall thickness. Large Bowel: Normal in caliber with normal wall thickness. Limited evaluation of   large bowel given presence of fecal material and lack of oral contrast.       Appendix: Not seen. Bladder: Limited evaluation given its under distention. No ascites or pneumoperitoneum. Umbilical hernia is demonstrated with loop of small bowel as well as omental fat   present within the defect. No evidence of obstruction. Hernia defect measures   approximately series 2 image 61 2.5 cm. Marked body habitus. Bones: No lytic or blastic lesions. CXR Results  (Last 48 hours)    None            Medical Decision Making   I am the first provider for this patient. I reviewed the vital signs, available nursing notes, past medical history, past surgical history, family history and social history. Vital Signs-Reviewed the patient's vital signs. Records Reviewed: Nursing Notes    Procedures:  EKG  Date/Time: 7/14/2019 8:15 AM  Performed by: ANALI Phillip  Authorized by: ANALI Phillip     Rate:     ECG rate:  73    ECG rate assessment: normal    Rhythm:     Rhythm: sinus rhythm    Ectopy:     Ectopy: none    QRS:     QRS axis:  Normal    QRS intervals:  Normal  Conduction:     Conduction: normal    ST segments:     ST segments:  Normal  T waves:     T waves: normal          Provider Notes (Medical Decision Making): Bowel obstruction hernia strangulation, gastritis, constipation, urinary tract infection kidney stone    Patient scan is unremarkable. Says her abdominal pain has improved. Initially she declined any kind of nebulizers for her COPD but now is saying that she would walk on some and after being given a round of nebs and duo nebs, she feels much better. Patient declined any steroid. She is been intermittently on a steroid since March. MED RECONCILIATION:  Current Facility-Administered Medications   Medication Dose Route Frequency    0.9% sodium chloride infusion  100 mL/hr IntraVENous CONTINUOUS    ondansetron (ZOFRAN) injection 4 mg  4 mg IntraVENous NOW    pantoprazole (PROTONIX) 40 mg in sodium chloride 0.9% 10 mL injection  40 mg IntraVENous ONCE    fentaNYL citrate (PF) injection 50 mcg  50 mcg IntraVENous NOW    dicyclomine (BENTYL) capsule 10 mg  10 mg Oral NOW     Current Outpatient Medications   Medication Sig    ondansetron (ZOFRAN ODT) 8 mg disintegrating tablet Take 1 Tab by mouth every eight (8) hours as needed for Nausea.     dicyclomine (BENTYL) 10 mg capsule Take 1 Cap by mouth four (4) times daily for 5 days.  lisinopril (PRINIVIL, ZESTRIL) 20 mg tablet Take 20 mg by mouth two (2) times a day.  albuterol sulfate (PROVENTIL;VENTOLIN) 2.5 mg/0.5 mL nebu nebulizer solution 0.5 mL by Nebulization route four (4) times daily as needed for Wheezing. To be used with HyperSal nebulizer solution. ICD code: COPD J44.1    predniSONE (DELTASONE) 10 mg tablet Take 60 mg by mouth daily for 1 day, THEN 50 mg daily for 3 days, THEN 40 mg daily for 3 days, THEN 30 mg daily for 3 days, THEN 20 mg daily for 3 days, THEN 10 mg every other day for 10 days. 1 tablet with breakfast every other morning    cetirizine (ZYRTEC) 10 mg tablet Take  by mouth daily as needed for Allergies or Rhinitis.  fluticasone propionate (FLONASE ALLERGY RELIEF) 50 mcg/actuation nasal spray 2 Sprays by Both Nostrils route daily.  fluticasone propion-salmeterol (ADVAIR HFA) 230-21 mcg/actuation inhaler Take 2 Puffs by inhalation two (2) times a day.  hydroCHLOROthiazide (HYDRODIURIL) 12.5 mg tablet Take 12.5 mg by mouth daily.  tiotropium bromide (SPIRIVA RESPIMAT) 2.5 mcg/actuation inhaler Take 2 Puffs by inhalation daily.  insulin glargine (LANTUS,BASAGLAR) 100 unit/mL (3 mL) inpn 35 Units by SubCUTAneous route nightly. (Patient taking differently: 30 Units by SubCUTAneous route nightly.)    albuterol sulfate 90 mcg/actuation aepb Take 1 Puff by inhalation every four (4) hours.  NOVOLOG FLEXPEN U-100 INSULIN 100 unit/mL inpn Continue home Sliding scale insulin as prior to admission (Patient taking differently: 1 Units by SubCUTAneous route three (3) times daily. If BG <100=0u  101-150=5u  151-250=8u  251-300=12u  >300 call MD  )    omeprazole (PRILOSEC) 40 mg capsule Take 40 mg by mouth daily. Indications: gastroesophageal reflux disease    lisinopril (PRINIVIL, ZESTRIL) 40 mg tablet Take 20 mg by mouth two (2) times a day.  Indications: high blood pressure    simethicone (MYLICON) 80 mg chewable tablet Take 80 mg by mouth every six (6) hours as needed for Flatulence.  cpap machine kit by Does Not Apply route nightly. M.E.D.    OXYGEN-AIR DELIVERY SYSTEMS 2 L by Nasal route continuous. [facility]    aspirin delayed-release 81 mg tablet Take 81 mg by mouth daily.  famotidine (PEPCID) 20 mg tablet Take 20 mg by mouth two (2) times daily as needed for Other (reflux).  montelukast (SINGULAIR) 10 mg tablet Take 10 mg by mouth nightly. Disposition:  home    DISCHARGE NOTE:   11:03 AM    Pt has been reexamined. Patient has no new complaints, changes, or physical findings. Care plan outlined and precautions discussed. Results of labs, CT were reviewed with the patient. All medications were reviewed with the patient; will d/c home with  See below. All of pt's questions and concerns were addressed. Patient was instructed and agrees to follow up with PCP, as well as to return to the ED upon further deterioration. Patient is ready to go home. Follow-up Information     Follow up With Specialties Details Why Contact Info    Jessie Garza MD [facility] Schedule an appointment as soon as possible for a visit in 1 day  [phone]  [phone] [address] [facility] EMERGENCY DEPT Emergency Medicine  If symptoms worsen return immediately [address]  [phone]          Current Discharge Medication List      START taking these medications    Details   ondansetron (ZOFRAN ODT) 8 mg disintegrating tablet Take 1 Tab by mouth every eight (8) hours as needed for Nausea. Qty: 12 Tab, Refills: 0      dicyclomine (BENTYL) 10 mg capsule Take 1 Cap by mouth four (4) times daily for 5 days. Qty: 20 Cap, Refills: 0             Diagnosis     Clinical Impression:   1. Acute exacerbation of chronic obstructive pulmonary disease (COPD) ([address]    2. Periumbilical abdominal pain    3.  Nausea without vomiting

## 2019-07-16 ENCOUNTER — OFFICE VISIT (OUTPATIENT)
Dept: PULMONOLOGY | Age: 59
End: 2019-07-16

## 2019-07-16 VITALS
SYSTOLIC BLOOD PRESSURE: 136 MMHG | TEMPERATURE: 98 F | OXYGEN SATURATION: 96 % | RESPIRATION RATE: 22 BRPM | DIASTOLIC BLOOD PRESSURE: 78 MMHG | BODY MASS INDEX: 44.44 KG/M2 | HEART RATE: 82 BPM | WEIGHT: 250.8 LBS | HEIGHT: 63 IN

## 2019-07-16 DIAGNOSIS — Z99.89 OSA ON CPAP: ICD-10-CM

## 2019-07-16 DIAGNOSIS — J96.11 CHRONIC RESPIRATORY FAILURE WITH HYPOXIA (HCC): ICD-10-CM

## 2019-07-16 DIAGNOSIS — J44.1 CHRONIC OBSTRUCTIVE PULMONARY DISEASE WITH ACUTE EXACERBATION (HCC): Primary | ICD-10-CM

## 2019-07-16 DIAGNOSIS — G47.33 OSA ON CPAP: ICD-10-CM

## 2019-07-16 DIAGNOSIS — J44.1 ACUTE EXACERBATION OF COPD WITH ASTHMA (HCC): ICD-10-CM

## 2019-07-16 DIAGNOSIS — J45.901 ACUTE EXACERBATION OF COPD WITH ASTHMA (HCC): ICD-10-CM

## 2019-07-16 RX ORDER — RANITIDINE 150 MG/1
150 TABLET, FILM COATED ORAL
COMMUNITY
End: 2019-08-31

## 2019-07-16 RX ORDER — SODIUM CHLORIDE FOR INHALATION 3.5 %
VIAL, NEBULIZER (ML) INHALATION
Refills: 6 | COMMUNITY
Start: 2019-06-12 | End: 2020-03-05

## 2019-07-16 RX ORDER — PREDNISONE 10 MG/1
TABLET ORAL
Qty: 50 TAB | Refills: 2 | Status: SHIPPED | OUTPATIENT
Start: 2019-07-16 | End: 2019-10-14

## 2019-07-16 NOTE — PROGRESS NOTES
Adriana Trujillo presents today for   Chief Complaint   Patient presents with    COPD     follow up from 6/24/2019    Sleep Apnea     on CPAP    Other     respiratory failure with hypoxia   [facility] Follow Up     from [facility] on 6/29-7/2/2019 for COPD exacerbation, CXR 6/29/2019       Is someone accompanying this pt? No    Is the patient using any DME equipment during OV? Yes. CPAP machine, O2, nebulizer, walker   -[employer]-O2 & nebulizer, M.E.D.- CPAP machine    Depression Screening:  3 most recent PHQ Screens 7/16/2019   Little interest or pleasure in doing things Not at all   Feeling down, depressed, irritable, or hopeless Not at all   Total Score PHQ 2 0       Learning Assessment:  Learning Assessment 4/9/2018   PRIMARY LEARNER Patient   HIGHEST LEVEL OF EDUCATION - PRIMARY LEARNER  SOME COLLEGE   BARRIERS PRIMARY LEARNER -   PRIMARY LANGUAGE ENGLISH   LEARNER PREFERENCE PRIMARY READING   ANSWERED BY patient Farheen Castro   RELATIONSHIP SELF       Abuse Screening:  No flowsheet data found. Fall Risk  Fall Risk Assessment, last 12 mths 2/6/2018   Able to walk? Yes   Fall in past 12 months? No         Coordination of Care:  1. Have you been to the ER, urgent care clinic since your last visit? Hospitalized since your last visit? Yes; Where: [facility], When: 6/29-7/2/2019-COPD exacerbation    2. Have you seen or consulted any other health care providers outside of the [facility] since your last visit? Include any pap smears or colon screening. Yes.  Dr. Amy Douglass, PCP

## 2019-07-16 NOTE — PROGRESS NOTES
[facility]  Pulmonary, Critical Care, and Sleep Medicine      Chief complaint:  COPD chronic respiratory failure SHERRIE    HPI:    Vanesa Khalil    is 60years old and returns the office today for follow-up after an exacerbation of COPD requiring hospitalization and treatment with prednisone in a tapering dose and Levaquin. She remains on Advair and Spiriva Respimat and PRN albuterol and supplemental oxygen and uses CPAP at night. She is scheduled to be switched to [facility] a noninvasive ventilator for CPAP and respiratory failure in the near future. She relates she is no longer coughing and having no chest pain or significant leg swelling but has significant shortness of breath she is using her albuterol. She is not taking Daliresp at this time because of feelings of depression      Allergies   Allergen Reactions    Ancef [Cefazolin] Hives    Contrast Agent [Iodine] Anaphylaxis, Shortness of Breath and Swelling     Needs pre-medication for IV contrast with Benadryl, Solu-Medrol    Fish Containing Products Anaphylaxis     Pt states she had a severe allergic reaction at 9 y/o.  Metformin Other (comments)     Abdominal pain, diarrhea.  Codeine Other (comments)     Altered mental status     Current Outpatient Medications   Medication Sig    HYPER-SAL 3.5 % nebu USE 1 VIAL IN NEBULIZER TWICE DAILY    raNITIdine (ZANTAC) 150 mg tablet Take 150 mg by mouth two (2) times daily as needed for Indigestion.  ondansetron (ZOFRAN ODT) 8 mg disintegrating tablet Take 1 Tab by mouth every eight (8) hours as needed for Nausea.  dicyclomine (BENTYL) 10 mg capsule Take 1 Cap by mouth four (4) times daily for 5 days.  lisinopril (PRINIVIL, ZESTRIL) 20 mg tablet Take 20 mg by mouth two (2) times a day.  albuterol sulfate (PROVENTIL;VENTOLIN) 2.5 mg/0.5 mL nebu nebulizer solution 0.5 mL by Nebulization route four (4) times daily as needed for Wheezing.  To be used with HyperSal nebulizer solution. ICD code: COPD J44.1    fluticasone propionate (FLONASE ALLERGY RELIEF) 50 mcg/actuation nasal spray 2 Sprays by Both Nostrils route daily.  fluticasone propion-salmeterol (ADVAIR HFA) 230-21 mcg/actuation inhaler Take 2 Puffs by inhalation two (2) times a day.  hydroCHLOROthiazide (HYDRODIURIL) 12.5 mg tablet Take 12.5 mg by mouth daily.  tiotropium bromide (SPIRIVA RESPIMAT) 2.5 mcg/actuation inhaler Take 2 Puffs by inhalation daily.  insulin glargine (LANTUS,BASAGLAR) 100 unit/mL (3 mL) inpn 35 Units by SubCUTAneous route nightly. (Patient taking differently: 30 Units by SubCUTAneous route nightly.)    albuterol sulfate 90 mcg/actuation aepb Take 1 Puff by inhalation every four (4) hours. (Patient taking differently: Take 1 Puff by inhalation every four (4) hours as needed.)    NOVOLOG FLEXPEN U-100 INSULIN 100 unit/mL inpn Continue home Sliding scale insulin as prior to admission (Patient taking differently: 1 Units by SubCUTAneous route three (3) times daily. If BG <100=0u  101-150=5u  151-250=8u  251-300=12u  >300 call MD  )    omeprazole (PRILOSEC) 40 mg capsule Take 40 mg by mouth daily. Indications: gastroesophageal reflux disease    simethicone (MYLICON) 80 mg chewable tablet Take 80 mg by mouth every six (6) hours as needed for Flatulence.  cpap machine kit by Does Not Apply route nightly. M.E.D.    OXYGEN-AIR DELIVERY SYSTEMS 2 L by Nasal route continuous. First Choice    aspirin delayed-release 81 mg tablet Take 81 mg by mouth daily.  montelukast (SINGULAIR) 10 mg tablet Take 10 mg by mouth nightly.  cetirizine (ZYRTEC) 10 mg tablet Take 10 mg by mouth daily as needed for Allergies or Rhinitis.  lisinopril (PRINIVIL, ZESTRIL) 40 mg tablet Take 20 mg by mouth two (2) times a day. Indications: high blood pressure    famotidine (PEPCID) 20 mg tablet Take 20 mg by mouth two (2) times daily as needed for Other (reflux).      No current facility-administered medications for this visit.       Past Medical History:   Diagnosis Date    Asthma     Chronic lung disease     COPD     Cystocele, midline     Diabetes mellitus (HCC)     GERD (gastroesophageal reflux disease)     Hidradenitis suppurativa     Hyperlipidemia     Hypertension     SHERRIE on CPAP     CPAP    Stress incontinence      Past Surgical History:   Procedure Laterality Date    BREAST SURGERY PROCEDURE UNLISTED      Right breast benign tumor removal    HX APPENDECTOMY      HX CHOLECYSTECTOMY      HX DILATION AND CURETTAGE      Dysfunctional uterine bleeding, thought 2/2 fibroids    HX TUBAL LIGATION       Social History     Socioeconomic History    Marital status: LEGALLY      Spouse name: Not on file    Number of children: Not on file    Years of education: Not on file    Highest education level: Not on file   Occupational History    Not on file   Social Needs    Financial resource strain: Not on file    Food insecurity:     Worry: Not on file     Inability: Not on file    Transportation needs:     Medical: Not on file     Non-medical: Not on file   Tobacco Use    Smoking status: Former Smoker     Packs/day: 1.00     Years: 30.00     Pack years: 30.00     Types: Cigarettes     Start date: 1966     Last attempt to quit: 2006     Years since quittin.8    Smokeless tobacco: Never Used    Tobacco comment: 1-1.5 packs per day   Substance and Sexual Activity    Alcohol use: No    Drug use: No    Sexual activity: Never   Lifestyle    Physical activity:     Days per week: Not on file     Minutes per session: Not on file    Stress: Not on file   Relationships    Social connections:     Talks on phone: Not on file     Gets together: Not on file     Attends [employer] service: Not on file     Active member of club or organization: Not on file     Attends meetings of clubs or organizations: Not on file     Relationship status: Not on file    Intimate partner violence: Fear of current or ex partner: Not on file     Emotionally abused: Not on file     Physically abused: Not on file     Forced sexual activity: Not on file   Other Topics Concern    Not on file   Social History Narrative    Not on file     Family History   Problem Relation Age of Onset    Hypertension Mother     Stroke Mother     Breast Cancer Mother         Bilateral mastectomies    Cancer Mother         ovarian and breast    Heart Failure Mother     Heart Attack Father         2011    Heart Surgery Father         CABG    Heart Failure Father     COPD Sister         Heavy smoker    Cancer Sister         ovarian    Heart Failure Sister     Lung Disease Sister     Asthma Child     Cancer Maternal Aunt         pancreatic    Cancer Maternal Grandfather         stomach       Review of systems:  She denies fever chills poor appetite weight loss but there is a question about neurological symptoms and she stopped taking Daliresp    Physical Exam:  Visit Vitals  /78 (BP 1 Location: Left arm, BP Patient Position: Sitting)   Pulse 82   Temp 98 °F (36.7 °C) (Oral)   Resp 22   Ht 5' 3\" (1.6 m)   Wt 113.8 kg (250 lb 12.8 oz)   SpO2 96% Comment: 2 LPM O2   BMI 44.43 kg/m²       Well-developed and obese  HEENT: WNL  Lymph node exam: Supraclavicular cervical lymph nodes negative  Chest: Equal symmetrical expansion no dullness and absence ofrales rubs bilateral expiratory fine wheezes  Heart: Regular rhythm no gallop no murmur no JVD no cyanosis clubbing or calf tenderness  Neurological: Alert and oriented    Labs:    O2 sat on supplemental oxygen 96%    Impression:     Exacerbation of COPD suggested by physical exam with wheezing and symptoms of persistent worsening shortness of breath    Plan:   Continue Advair Spiriva PRN albuterol supplemental oxygen and trilogy noninvasive ventilator  10-day course of prednisone 40 mg a day and start back on 10 mg every other day  Hold Daliresp for now  Follow-up in 1 rohit Mackey MD , [facility]    CC: Lidia Roblero MD     [address] [facility], [address] [address] [address]     P: [phone]     F: [phone]

## 2019-07-16 NOTE — PATIENT INSTRUCTIONS
Continue Spiriva Respimat 2 inhalations daily    Continue Advair 2 inhalations twice daily and remember to wash mouth with water and spit it out after inhaling    Continue supplemental oxygen and CPAP or trilogy at night with sleep    Prednisone 4 tablets every morning with breakfast and after 10 days start 1 tablet every other morning

## 2019-08-12 ENCOUNTER — OFFICE VISIT (OUTPATIENT)
Dept: PULMONOLOGY | Age: 59
End: 2019-08-12

## 2019-08-12 VITALS
HEART RATE: 75 BPM | WEIGHT: 250 LBS | SYSTOLIC BLOOD PRESSURE: 156 MMHG | RESPIRATION RATE: 18 BRPM | DIASTOLIC BLOOD PRESSURE: 76 MMHG | TEMPERATURE: 97.9 F | BODY MASS INDEX: 44.3 KG/M2 | HEIGHT: 63 IN | OXYGEN SATURATION: 97 %

## 2019-08-12 DIAGNOSIS — J44.1 CHRONIC OBSTRUCTIVE PULMONARY DISEASE WITH ACUTE EXACERBATION (HCC): Primary | ICD-10-CM

## 2019-08-12 DIAGNOSIS — J96.11 CHRONIC RESPIRATORY FAILURE WITH HYPOXIA (HCC): ICD-10-CM

## 2019-08-12 NOTE — PATIENT INSTRUCTIONS
Continue trilogy at night and supplemental oxygen    Continue Spiriva Respimat 2 inhalations once daily    Continue Advair 2 inhalations twice daily about 12 hours apart and remember to wash mouth with water and spit it out after inhaling    Albuterol 2 puffs every 4 hours as needed if you require albuterol too often to control respiratory symptoms call the office for severe symptoms go to the emergency room    Prednisone 10 mg every other a.m. with breakfast    Begin a conditioning program when you start walking 4-5 times a week without stopping  Begin with 1-1/2 minutes up in 1-1/2 minutes back and gradually increase the time you walk    Always call for symptoms such as worsening shortness of

## 2019-08-12 NOTE — PROGRESS NOTES
[facility]  Pulmonary, Critical Care, and Sleep Medicine      Chief complaint:  COPD respiratory failure    HPI:    Alonso Xiao    is 60years old and returns the office today for follow-up concerning COPD and relates that she has no chest pain or significant cough that she does have leg edema off and on but none now and that she has dyspnea on exertion which is slightly improved. She continues to take Advair Spiriva PRN albuterol supplemental oxygen and trilogy noninvasive ventilator at night. She says she is sleeping better with the trilogy. Allergies   Allergen Reactions    Ancef [Cefazolin] Hives    Contrast Agent [Iodine] Anaphylaxis, Shortness of Breath and Swelling     Needs pre-medication for IV contrast with Benadryl, Solu-Medrol    Fish Containing Products Anaphylaxis     Pt states she had a severe allergic reaction at 9 y/o.  Metformin Other (comments)     Abdominal pain, diarrhea.  Codeine Other (comments)     Altered mental status     Current Outpatient Medications   Medication Sig    HYPER-SAL 3.5 % nebu USE 1 VIAL IN NEBULIZER TWICE DAILY    raNITIdine (ZANTAC) 150 mg tablet Take 150 mg by mouth two (2) times daily as needed for Indigestion.  predniSONE (DELTASONE) 10 mg tablet 4 tablets every morning with breakfast for 10 days then 1 tablet every other morning with breakfast    lisinopril (PRINIVIL, ZESTRIL) 20 mg tablet Take 20 mg by mouth two (2) times a day.  albuterol sulfate (PROVENTIL;VENTOLIN) 2.5 mg/0.5 mL nebu nebulizer solution 0.5 mL by Nebulization route four (4) times daily as needed for Wheezing. To be used with HyperSal nebulizer solution. ICD code: COPD J44.1    fluticasone propionate (FLONASE ALLERGY RELIEF) 50 mcg/actuation nasal spray 2 Sprays by Both Nostrils route daily.  fluticasone propion-salmeterol (ADVAIR HFA) 230-21 mcg/actuation inhaler Take 2 Puffs by inhalation two (2) times a day.     hydroCHLOROthiazide (HYDRODIURIL) 12.5 mg tablet Take 12.5 mg by mouth daily.  tiotropium bromide (SPIRIVA RESPIMAT) 2.5 mcg/actuation inhaler Take 2 Puffs by inhalation daily.  insulin glargine (LANTUS,BASAGLAR) 100 unit/mL (3 mL) inpn 35 Units by SubCUTAneous route nightly. (Patient taking differently: 30 Units by SubCUTAneous route nightly.)    albuterol sulfate 90 mcg/actuation aepb Take 1 Puff by inhalation every four (4) hours. (Patient taking differently: Take 1 Puff by inhalation every four (4) hours as needed.)    NOVOLOG FLEXPEN U-100 INSULIN 100 unit/mL inpn Continue home Sliding scale insulin as prior to admission (Patient taking differently: 1 Units by SubCUTAneous route three (3) times daily. If BG <100=0u  101-150=5u  151-250=8u  251-300=12u  >300 call MD  )    omeprazole (PRILOSEC) 40 mg capsule Take 40 mg by mouth daily. Indications: gastroesophageal reflux disease    cpap machine kit by Does Not Apply route nightly. M.E.D.    OXYGEN-AIR DELIVERY SYSTEMS 2 L by Nasal route continuous. First Choice    aspirin delayed-release 81 mg tablet Take 81 mg by mouth daily.  montelukast (SINGULAIR) 10 mg tablet Take 10 mg by mouth nightly.  ondansetron (ZOFRAN ODT) 8 mg disintegrating tablet Take 1 Tab by mouth every eight (8) hours as needed for Nausea.  cetirizine (ZYRTEC) 10 mg tablet Take 10 mg by mouth daily as needed for Allergies or Rhinitis.  lisinopril (PRINIVIL, ZESTRIL) 40 mg tablet Take 20 mg by mouth two (2) times a day. Indications: high blood pressure    simethicone (MYLICON) 80 mg chewable tablet Take 80 mg by mouth every six (6) hours as needed for Flatulence.  famotidine (PEPCID) 20 mg tablet Take 20 mg by mouth two (2) times daily as needed for Other (reflux). No current facility-administered medications for this visit.       Past Medical History:   Diagnosis Date    Asthma     Chronic lung disease     COPD     Cystocele, midline     Diabetes mellitus (HCC)     GERD (gastroesophageal reflux disease)     Hidradenitis suppurativa     Hyperlipidemia     Hypertension     SHERRIE on CPAP     CPAP    Stress incontinence      Past Surgical History:   Procedure Laterality Date    BREAST SURGERY PROCEDURE UNLISTED      Right breast benign tumor removal    HX APPENDECTOMY      HX CHOLECYSTECTOMY      HX DILATION AND CURETTAGE      Dysfunctional uterine bleeding, thought 2/2 fibroids    HX TUBAL LIGATION       Social History     Socioeconomic History    Marital status: LEGALLY      Spouse name: Not on file    Number of children: Not on file    Years of education: Not on file    Highest education level: Not on file   Occupational History    Not on file   Social Needs    Financial resource strain: Not on file    Food insecurity:     Worry: Not on file     Inability: Not on file    Transportation needs:     Medical: Not on file     Non-medical: Not on file   Tobacco Use    Smoking status: Former Smoker     Packs/day: 1.00     Years: 30.00     Pack years: 30.00     Types: Cigarettes     Start date: 1966     Last attempt to quit: 2006     Years since quittin.9    Smokeless tobacco: Never Used    Tobacco comment: 1-1.5 packs per day   Substance and Sexual Activity    Alcohol use: No    Drug use: No    Sexual activity: Never   Lifestyle    Physical activity:     Days per week: Not on file     Minutes per session: Not on file    Stress: Not on file   Relationships    Social connections:     Talks on phone: Not on file     Gets together: Not on file     Attends [employer] service: Not on file     Active member of club or organization: Not on file     Attends meetings of clubs or organizations: Not on file     Relationship status: Not on file    Intimate partner violence:     Fear of current or ex partner: Not on file     Emotionally abused: Not on file     Physically abused: Not on file     Forced sexual activity: Not on file   Other Topics Concern    Not on file   Social History Narrative    Not on file     Family History   Problem Relation Age of Onset    Hypertension Mother     Stroke Mother     Breast Cancer Mother         Bilateral mastectomies    Cancer Mother         ovarian and breast    Heart Failure Mother     Heart Attack Father         2011    Heart Surgery Father         CABG    Heart Failure Father     COPD Sister         Heavy smoker    Cancer Sister         ovarian    Heart Failure Sister     Lung Disease Sister     Asthma Child     Cancer Maternal Aunt         pancreatic    Cancer Maternal Grandfather         stomach       Review of systems:  She denies fever chills poor appetite weight loss    Physical Exam:  Visit Vitals  /76 (BP 1 Location: Left arm, BP Patient Position: Sitting)   Pulse 75   Temp 97.9 °F (36.6 °C) (Oral)   Resp 18   Ht 5' 3\" (1.6 m)   Wt 113.4 kg (250 lb)   SpO2 97% Comment: 2 LPM   BMI 44.29 kg/m²       Well-developed obese  HEENT: WNL  Lymph node exam: Supraclavicular cervical lymph nodes negative  Chest: Equal symmetrical expansion no dullness no wheezes rales rubs  Heart: Regular rhythm no gallop no murmur no JVD no peripheral edema  Extremities: No cyanosis clubbing or calf tenderness  Neurological: Alert and oriented    Labs:    O2 sat supplemental oxygen 97%    Impression:   Unable to take Daliresp  By history physical exam COPD chronic respiratory failure improved    Plan:   Continue every other day prednisone for 6 more weeks  Continue Advair Spiriva PRN albuterol supplemental oxygen and trilogy nightly  Begin modest exercise program  Follow-up in 6 weeks or sooner if needed  Art Mitchell MD , [facility]    CC: Aracelis Riley MD     [address] [facility], [address] [address],[place] [address]     P: [phone]     F: [phone]

## 2019-08-12 NOTE — PROGRESS NOTES
Ramon Lynch presents today for   Chief Complaint   Patient presents with    Shortness of Breath       Is someone accompanying this pt? No    Is the patient using any DME equipment during OV? Yes Oxygen    -[employer]    Depression Screening:  3 most recent PHQ Screens 7/16/2019   Little interest or pleasure in doing things Not at all   Feeling down, depressed, irritable, or hopeless Not at all   Total Score PHQ 2 0       Learning Assessment:  Learning Assessment 4/9/2018   PRIMARY LEARNER Patient   HIGHEST LEVEL OF EDUCATION - PRIMARY LEARNER  SOME COLLEGE   BARRIERS PRIMARY LEARNER -   PRIMARY LANGUAGE ENGLISH   LEARNER PREFERENCE PRIMARY READING   ANSWERED BY patient Kathryn Braden   RELATIONSHIP SELF       Abuse Screening:  No flowsheet data found. Fall Risk  Fall Risk Assessment, last 12 mths 2/6/2018   Able to walk? Yes   Fall in past 12 months? No         Coordination of Care:  1. Have you been to the ER, urgent care clinic since your last visit? Hospitalized since your last visit? No    2. Have you seen or consulted any other health care providers outside of the [address] [place] [address] since your last visit?  Include any pap smears or colon screen   No

## 2019-08-30 ENCOUNTER — HOSPITAL ENCOUNTER (INPATIENT)
Age: 59
LOS: 5 days | Discharge: HOME | DRG: 190 | End: 2019-09-05
Attending: EMERGENCY MEDICINE | Admitting: FAMILY MEDICINE
Payer: MEDICARE

## 2019-08-30 ENCOUNTER — APPOINTMENT (OUTPATIENT)
Dept: GENERAL RADIOLOGY | Age: 59
DRG: 190 | End: 2019-08-30
Attending: EMERGENCY MEDICINE
Payer: MEDICARE

## 2019-08-30 DIAGNOSIS — J44.1 ACUTE EXACERBATION OF CHRONIC OBSTRUCTIVE PULMONARY DISEASE (COPD) (HCC): Primary | ICD-10-CM

## 2019-08-30 DIAGNOSIS — R06.03 RESPIRATORY DISTRESS: ICD-10-CM

## 2019-08-30 LAB
ALBUMIN SERPL-MCNC: 3.9 G/DL (ref 3.4–5)
ALBUMIN/GLOB SERPL: 1 {RATIO} (ref 0.8–1.7)
ALP SERPL-CCNC: 101 U/L (ref 45–117)
ALT SERPL-CCNC: 46 U/L (ref 13–56)
ANION GAP SERPL CALC-SCNC: 8 MMOL/L (ref 3–18)
APPEARANCE UR: ABNORMAL
ARTERIAL PATENCY WRIST A: ABNORMAL
AST SERPL-CCNC: 23 U/L (ref 10–38)
BACTERIA URNS QL MICRO: ABNORMAL /HPF
BASE EXCESS BLDV CALC-SCNC: 5 MMOL/L
BASOPHILS # BLD: 0 K/UL (ref 0–0.1)
BASOPHILS NFR BLD: 0 % (ref 0–2)
BDY SITE: ABNORMAL
BILIRUB SERPL-MCNC: 0.8 MG/DL (ref 0.2–1)
BILIRUB UR QL: NEGATIVE
BUN SERPL-MCNC: 12 MG/DL (ref 7–18)
BUN/CREAT SERPL: 12 (ref 12–20)
CALCIUM SERPL-MCNC: 9.5 MG/DL (ref 8.5–10.1)
CHLORIDE SERPL-SCNC: 103 MMOL/L (ref 100–111)
CK MB CFR SERPL CALC: NORMAL % (ref 0–4)
CK MB SERPL-MCNC: <1 NG/ML (ref 5–25)
CK SERPL-CCNC: 115 U/L (ref 26–192)
CO2 SERPL-SCNC: 31 MMOL/L (ref 21–32)
COLOR UR: ABNORMAL
CREAT SERPL-MCNC: 1.02 MG/DL (ref 0.6–1.3)
DIFFERENTIAL METHOD BLD: ABNORMAL
EOSINOPHIL # BLD: 0.2 K/UL (ref 0–0.4)
EOSINOPHIL NFR BLD: 2 % (ref 0–5)
EPITH CASTS URNS QL MICRO: ABNORMAL /LPF (ref 0–5)
ERYTHROCYTE [DISTWIDTH] IN BLOOD BY AUTOMATED COUNT: 13.5 % (ref 11.6–14.5)
GAS FLOW.O2 O2 DELIVERY SYS: ABNORMAL L/MIN
GAS FLOW.O2 SETTING OXYMISER: 3 L/M
GLOBULIN SER CALC-MCNC: 4 G/DL (ref 2–4)
GLUCOSE SERPL-MCNC: 157 MG/DL (ref 74–99)
GLUCOSE UR STRIP.AUTO-MCNC: NEGATIVE MG/DL
HCO3 BLDV-SCNC: 30.3 MMOL/L (ref 23–28)
HCT VFR BLD AUTO: 40.3 % (ref 35–45)
HGB BLD-MCNC: 14.1 G/DL (ref 12–16)
HGB UR QL STRIP: NEGATIVE
KETONES UR QL STRIP.AUTO: ABNORMAL MG/DL
LACTATE BLD-SCNC: 2.17 MMOL/L (ref 0.4–2)
LEUKOCYTE ESTERASE UR QL STRIP.AUTO: NEGATIVE
LYMPHOCYTES # BLD: 2.2 K/UL (ref 0.9–3.6)
LYMPHOCYTES NFR BLD: 23 % (ref 21–52)
MCH RBC QN AUTO: 30.6 PG (ref 24–34)
MCHC RBC AUTO-ENTMCNC: 35 G/DL (ref 31–37)
MCV RBC AUTO: 87.4 FL (ref 74–97)
MONOCYTES # BLD: 0.7 K/UL (ref 0.05–1.2)
MONOCYTES NFR BLD: 7 % (ref 3–10)
MUCOUS THREADS URNS QL MICRO: ABNORMAL /LPF
NEUTS SEG # BLD: 6.4 K/UL (ref 1.8–8)
NEUTS SEG NFR BLD: 68 % (ref 40–73)
NITRITE UR QL STRIP.AUTO: NEGATIVE
PCO2 BLDV: 44 MMHG (ref 41–51)
PH BLDV: 7.45 [PH] (ref 7.32–7.42)
PH UR STRIP: 5 [PH] (ref 5–8)
PLATELET # BLD AUTO: 250 K/UL (ref 135–420)
PMV BLD AUTO: 9 FL (ref 9.2–11.8)
PO2 BLDV: 33 MMHG (ref 25–40)
POTASSIUM SERPL-SCNC: 3.6 MMOL/L (ref 3.5–5.5)
PROT SERPL-MCNC: 7.9 G/DL (ref 6.4–8.2)
PROT UR STRIP-MCNC: 30 MG/DL
RBC # BLD AUTO: 4.61 M/UL (ref 4.2–5.3)
RBC #/AREA URNS HPF: NEGATIVE /HPF (ref 0–5)
SAO2 % BLDV: 66 % (ref 65–88)
SERVICE CMNT-IMP: ABNORMAL
SODIUM SERPL-SCNC: 142 MMOL/L (ref 136–145)
SP GR UR REFRACTOMETRY: 1.03 (ref 1–1.03)
SPECIMEN TYPE: ABNORMAL
TOTAL RESP. RATE, ITRR: 18
TROPONIN I SERPL-MCNC: <0.02 NG/ML (ref 0–0.04)
UROBILINOGEN UR QL STRIP.AUTO: 1 EU/DL (ref 0.2–1)
WBC # BLD AUTO: 9.4 K/UL (ref 4.6–13.2)
WBC URNS QL MICRO: ABNORMAL /HPF (ref 0–4)

## 2019-08-30 PROCEDURE — 94640 AIRWAY INHALATION TREATMENT: CPT

## 2019-08-30 PROCEDURE — 96365 THER/PROPH/DIAG IV INF INIT: CPT

## 2019-08-30 PROCEDURE — 81001 URINALYSIS AUTO W/SCOPE: CPT

## 2019-08-30 PROCEDURE — 71045 X-RAY EXAM CHEST 1 VIEW: CPT

## 2019-08-30 PROCEDURE — 74011000250 HC RX REV CODE- 250: Performed by: EMERGENCY MEDICINE

## 2019-08-30 PROCEDURE — 93005 ELECTROCARDIOGRAM TRACING: CPT

## 2019-08-30 PROCEDURE — 85025 COMPLETE CBC W/AUTO DIFF WBC: CPT

## 2019-08-30 PROCEDURE — 87040 BLOOD CULTURE FOR BACTERIA: CPT

## 2019-08-30 PROCEDURE — 87086 URINE CULTURE/COLONY COUNT: CPT

## 2019-08-30 PROCEDURE — 5A09357 ASSISTANCE WITH RESPIRATORY VENTILATION, LESS THAN 24 CONSECUTIVE HOURS, CONTINUOUS POSITIVE AIRWAY PRESSURE: ICD-10-PCS | Performed by: EMERGENCY MEDICINE

## 2019-08-30 PROCEDURE — 99285 EMERGENCY DEPT VISIT HI MDM: CPT

## 2019-08-30 PROCEDURE — 82803 BLOOD GASES ANY COMBINATION: CPT

## 2019-08-30 PROCEDURE — 83605 ASSAY OF LACTIC ACID: CPT

## 2019-08-30 PROCEDURE — 74011250636 HC RX REV CODE- 250/636: Performed by: EMERGENCY MEDICINE

## 2019-08-30 PROCEDURE — 82550 ASSAY OF CK (CPK): CPT

## 2019-08-30 PROCEDURE — 80053 COMPREHEN METABOLIC PANEL: CPT

## 2019-08-30 PROCEDURE — 96375 TX/PRO/DX INJ NEW DRUG ADDON: CPT

## 2019-08-30 PROCEDURE — 94660 CPAP INITIATION&MGMT: CPT

## 2019-08-30 RX ORDER — SODIUM CHLORIDE 0.9 % (FLUSH) 0.9 %
5-10 SYRINGE (ML) INJECTION AS NEEDED
Status: DISCONTINUED | OUTPATIENT
Start: 2019-08-30 | End: 2019-09-05 | Stop reason: HOSPADM

## 2019-08-30 RX ORDER — IPRATROPIUM BROMIDE AND ALBUTEROL SULFATE 2.5; .5 MG/3ML; MG/3ML
3 SOLUTION RESPIRATORY (INHALATION)
Status: COMPLETED | OUTPATIENT
Start: 2019-08-30 | End: 2019-08-30

## 2019-08-30 RX ORDER — MAGNESIUM SULFATE HEPTAHYDRATE 40 MG/ML
2 INJECTION, SOLUTION INTRAVENOUS
Status: COMPLETED | OUTPATIENT
Start: 2019-08-30 | End: 2019-08-30

## 2019-08-30 RX ORDER — LEVOFLOXACIN 5 MG/ML
750 INJECTION, SOLUTION INTRAVENOUS
Status: COMPLETED | OUTPATIENT
Start: 2019-08-30 | End: 2019-08-31

## 2019-08-30 RX ORDER — PREDNISONE 10 MG/1
10 TABLET ORAL
COMMUNITY
End: 2019-08-31 | Stop reason: SDUPTHER

## 2019-08-30 RX ADMIN — METHYLPREDNISOLONE SODIUM SUCCINATE 125 MG: 125 INJECTION, POWDER, FOR SOLUTION INTRAMUSCULAR; INTRAVENOUS at 20:47

## 2019-08-30 RX ADMIN — SODIUM CHLORIDE 500 ML: 900 INJECTION, SOLUTION INTRAVENOUS at 20:44

## 2019-08-30 RX ADMIN — IPRATROPIUM BROMIDE AND ALBUTEROL SULFATE 3 ML: .5; 3 SOLUTION RESPIRATORY (INHALATION) at 20:48

## 2019-08-30 RX ADMIN — IPRATROPIUM BROMIDE AND ALBUTEROL SULFATE 3 ML: .5; 3 SOLUTION RESPIRATORY (INHALATION) at 23:32

## 2019-08-30 RX ADMIN — MAGNESIUM SULFATE 2 G: 2 INJECTION INTRAVENOUS at 20:46

## 2019-08-31 LAB
ANION GAP SERPL CALC-SCNC: 9 MMOL/L (ref 3–18)
ARTERIAL PATENCY WRIST A: YES
ATRIAL RATE: 101 BPM
BASE DEFICIT BLD-SCNC: 2 MMOL/L
BASOPHILS # BLD: 0 K/UL (ref 0–0.1)
BASOPHILS NFR BLD: 0 % (ref 0–2)
BDY SITE: ABNORMAL
BUN SERPL-MCNC: 14 MG/DL (ref 7–18)
BUN/CREAT SERPL: 14 (ref 12–20)
CALCIUM SERPL-MCNC: 8.9 MG/DL (ref 8.5–10.1)
CALCULATED P AXIS, ECG09: 45 DEGREES
CALCULATED R AXIS, ECG10: 11 DEGREES
CALCULATED T AXIS, ECG11: 51 DEGREES
CHLORIDE SERPL-SCNC: 103 MMOL/L (ref 100–111)
CO2 SERPL-SCNC: 26 MMOL/L (ref 21–32)
CREAT SERPL-MCNC: 1.02 MG/DL (ref 0.6–1.3)
DIAGNOSIS, 93000: NORMAL
DIFFERENTIAL METHOD BLD: ABNORMAL
EOSINOPHIL # BLD: 0 K/UL (ref 0–0.4)
EOSINOPHIL NFR BLD: 0 % (ref 0–5)
ERYTHROCYTE [DISTWIDTH] IN BLOOD BY AUTOMATED COUNT: 13.4 % (ref 11.6–14.5)
GAS FLOW.O2 O2 DELIVERY SYS: ABNORMAL L/MIN
GAS FLOW.O2 SETTING OXYMISER: 2 L/M
GLUCOSE BLD STRIP.AUTO-MCNC: 257 MG/DL (ref 70–110)
GLUCOSE BLD STRIP.AUTO-MCNC: 264 MG/DL (ref 70–110)
GLUCOSE BLD STRIP.AUTO-MCNC: 264 MG/DL (ref 70–110)
GLUCOSE BLD STRIP.AUTO-MCNC: 266 MG/DL (ref 70–110)
GLUCOSE BLD STRIP.AUTO-MCNC: 302 MG/DL (ref 70–110)
GLUCOSE SERPL-MCNC: 259 MG/DL (ref 74–99)
HCO3 BLD-SCNC: 23.8 MMOL/L (ref 22–26)
HCT VFR BLD AUTO: 36.2 % (ref 35–45)
HGB BLD-MCNC: 12.4 G/DL (ref 12–16)
LACTATE BLD-SCNC: 3.57 MMOL/L (ref 0.4–2)
LACTATE BLD-SCNC: 3.83 MMOL/L (ref 0.4–2)
LACTATE SERPL-SCNC: 2.9 MMOL/L (ref 0.4–2)
LACTATE SERPL-SCNC: 3.8 MMOL/L (ref 0.4–2)
LACTATE SERPL-SCNC: 4.1 MMOL/L (ref 0.4–2)
LYMPHOCYTES # BLD: 0.6 K/UL (ref 0.9–3.6)
LYMPHOCYTES NFR BLD: 7 % (ref 21–52)
MAGNESIUM SERPL-MCNC: 1.9 MG/DL (ref 1.6–2.6)
MCH RBC QN AUTO: 29.9 PG (ref 24–34)
MCHC RBC AUTO-ENTMCNC: 34.3 G/DL (ref 31–37)
MCV RBC AUTO: 87.2 FL (ref 74–97)
MONOCYTES # BLD: 0.3 K/UL (ref 0.05–1.2)
MONOCYTES NFR BLD: 3 % (ref 3–10)
NEUTS SEG # BLD: 8.3 K/UL (ref 1.8–8)
NEUTS SEG NFR BLD: 90 % (ref 40–73)
P-R INTERVAL, ECG05: 132 MS
PCO2 BLD: 41.8 MMHG (ref 35–45)
PH BLD: 7.36 [PH] (ref 7.35–7.45)
PLATELET # BLD AUTO: 264 K/UL (ref 135–420)
PMV BLD AUTO: 9.2 FL (ref 9.2–11.8)
PO2 BLD: 106 MMHG (ref 80–100)
POTASSIUM SERPL-SCNC: 4.3 MMOL/L (ref 3.5–5.5)
Q-T INTERVAL, ECG07: 400 MS
QRS DURATION, ECG06: 86 MS
QTC CALCULATION (BEZET), ECG08: 518 MS
RBC # BLD AUTO: 4.15 M/UL (ref 4.2–5.3)
SAO2 % BLD: 98 % (ref 92–97)
SERVICE CMNT-IMP: ABNORMAL
SODIUM SERPL-SCNC: 138 MMOL/L (ref 136–145)
SPECIMEN TYPE: ABNORMAL
TOTAL RESP. RATE, ITRR: 20
VENTRICULAR RATE, ECG03: 101 BPM
WBC # BLD AUTO: 9.2 K/UL (ref 4.6–13.2)

## 2019-08-31 PROCEDURE — 74011000250 HC RX REV CODE- 250: Performed by: INTERNAL MEDICINE

## 2019-08-31 PROCEDURE — 82962 GLUCOSE BLOOD TEST: CPT

## 2019-08-31 PROCEDURE — 94640 AIRWAY INHALATION TREATMENT: CPT

## 2019-08-31 PROCEDURE — 74011250636 HC RX REV CODE- 250/636: Performed by: STUDENT IN AN ORGANIZED HEALTH CARE EDUCATION/TRAINING PROGRAM

## 2019-08-31 PROCEDURE — 74011000250 HC RX REV CODE- 250: Performed by: STUDENT IN AN ORGANIZED HEALTH CARE EDUCATION/TRAINING PROGRAM

## 2019-08-31 PROCEDURE — 36600 WITHDRAWAL OF ARTERIAL BLOOD: CPT

## 2019-08-31 PROCEDURE — 74011250637 HC RX REV CODE- 250/637: Performed by: STUDENT IN AN ORGANIZED HEALTH CARE EDUCATION/TRAINING PROGRAM

## 2019-08-31 PROCEDURE — 87070 CULTURE OTHR SPECIMN AEROBIC: CPT

## 2019-08-31 PROCEDURE — 94762 N-INVAS EAR/PLS OXIMTRY CONT: CPT

## 2019-08-31 PROCEDURE — 74011000258 HC RX REV CODE- 258: Performed by: STUDENT IN AN ORGANIZED HEALTH CARE EDUCATION/TRAINING PROGRAM

## 2019-08-31 PROCEDURE — 82803 BLOOD GASES ANY COMBINATION: CPT

## 2019-08-31 PROCEDURE — 83605 ASSAY OF LACTIC ACID: CPT

## 2019-08-31 PROCEDURE — 74011636637 HC RX REV CODE- 636/637: Performed by: STUDENT IN AN ORGANIZED HEALTH CARE EDUCATION/TRAINING PROGRAM

## 2019-08-31 PROCEDURE — 74011636637 HC RX REV CODE- 636/637: Performed by: FAMILY MEDICINE

## 2019-08-31 PROCEDURE — 65610000006 HC RM INTENSIVE CARE

## 2019-08-31 PROCEDURE — 77010033678 HC OXYGEN DAILY

## 2019-08-31 PROCEDURE — 83735 ASSAY OF MAGNESIUM: CPT

## 2019-08-31 PROCEDURE — 74011250636 HC RX REV CODE- 250/636: Performed by: EMERGENCY MEDICINE

## 2019-08-31 PROCEDURE — 80048 BASIC METABOLIC PNL TOTAL CA: CPT

## 2019-08-31 PROCEDURE — 94660 CPAP INITIATION&MGMT: CPT

## 2019-08-31 PROCEDURE — 85025 COMPLETE CBC W/AUTO DIFF WBC: CPT

## 2019-08-31 PROCEDURE — 36415 COLL VENOUS BLD VENIPUNCTURE: CPT

## 2019-08-31 RX ORDER — INSULIN LISPRO 100 [IU]/ML
INJECTION, SOLUTION INTRAVENOUS; SUBCUTANEOUS
Status: DISCONTINUED | OUTPATIENT
Start: 2019-08-31 | End: 2019-08-31

## 2019-08-31 RX ORDER — IPRATROPIUM BROMIDE AND ALBUTEROL SULFATE 2.5; .5 MG/3ML; MG/3ML
3 SOLUTION RESPIRATORY (INHALATION)
Status: DISCONTINUED | OUTPATIENT
Start: 2019-08-31 | End: 2019-09-05 | Stop reason: HOSPADM

## 2019-08-31 RX ORDER — FAMOTIDINE 20 MG/1
20 TABLET, FILM COATED ORAL 2 TIMES DAILY
Status: DISCONTINUED | OUTPATIENT
Start: 2019-08-31 | End: 2019-08-31

## 2019-08-31 RX ORDER — HEPARIN SODIUM 5000 [USP'U]/ML
5000 INJECTION, SOLUTION INTRAVENOUS; SUBCUTANEOUS EVERY 8 HOURS
Status: DISCONTINUED | OUTPATIENT
Start: 2019-08-31 | End: 2019-09-05 | Stop reason: HOSPADM

## 2019-08-31 RX ORDER — INSULIN LISPRO 100 [IU]/ML
INJECTION, SOLUTION INTRAVENOUS; SUBCUTANEOUS
Status: DISCONTINUED | OUTPATIENT
Start: 2019-08-31 | End: 2019-09-05 | Stop reason: HOSPADM

## 2019-08-31 RX ORDER — HYDROCHLOROTHIAZIDE 25 MG/1
12.5 TABLET ORAL DAILY
Status: DISCONTINUED | OUTPATIENT
Start: 2019-08-31 | End: 2019-08-31

## 2019-08-31 RX ORDER — DEXTROSE 50 % IN WATER (D50W) INTRAVENOUS SYRINGE
25-50 AS NEEDED
Status: DISCONTINUED | OUTPATIENT
Start: 2019-08-31 | End: 2019-09-01

## 2019-08-31 RX ORDER — SODIUM CHLORIDE, SODIUM LACTATE, POTASSIUM CHLORIDE, CALCIUM CHLORIDE 600; 310; 30; 20 MG/100ML; MG/100ML; MG/100ML; MG/100ML
1000 INJECTION, SOLUTION INTRAVENOUS ONCE
Status: COMPLETED | OUTPATIENT
Start: 2019-08-31 | End: 2019-08-31

## 2019-08-31 RX ORDER — HYDROCHLOROTHIAZIDE 12.5 MG/1
12.5 CAPSULE ORAL DAILY
Status: DISCONTINUED | OUTPATIENT
Start: 2019-08-31 | End: 2019-09-05 | Stop reason: HOSPADM

## 2019-08-31 RX ORDER — ASPIRIN 81 MG/1
81 TABLET ORAL DAILY
Status: DISCONTINUED | OUTPATIENT
Start: 2019-08-31 | End: 2019-09-05 | Stop reason: HOSPADM

## 2019-08-31 RX ORDER — ALBUTEROL SULFATE 0.83 MG/ML
2.5 SOLUTION RESPIRATORY (INHALATION)
Status: DISCONTINUED | OUTPATIENT
Start: 2019-08-31 | End: 2019-09-01

## 2019-08-31 RX ORDER — MONTELUKAST SODIUM 10 MG/1
10 TABLET ORAL
Status: DISCONTINUED | OUTPATIENT
Start: 2019-08-31 | End: 2019-09-05 | Stop reason: HOSPADM

## 2019-08-31 RX ORDER — BUDESONIDE AND FORMOTEROL FUMARATE DIHYDRATE 160; 4.5 UG/1; UG/1
2 AEROSOL RESPIRATORY (INHALATION)
Status: DISCONTINUED | OUTPATIENT
Start: 2019-08-31 | End: 2019-09-05 | Stop reason: HOSPADM

## 2019-08-31 RX ORDER — PANTOPRAZOLE SODIUM 40 MG/1
40 TABLET, DELAYED RELEASE ORAL
Status: DISCONTINUED | OUTPATIENT
Start: 2019-08-31 | End: 2019-09-05 | Stop reason: HOSPADM

## 2019-08-31 RX ORDER — PANTOPRAZOLE SODIUM 40 MG/1
40 TABLET, DELAYED RELEASE ORAL
Status: DISCONTINUED | OUTPATIENT
Start: 2019-08-31 | End: 2019-08-31

## 2019-08-31 RX ORDER — MAGNESIUM SULFATE 100 %
4 CRYSTALS MISCELLANEOUS AS NEEDED
Status: DISCONTINUED | OUTPATIENT
Start: 2019-08-31 | End: 2019-09-05 | Stop reason: HOSPADM

## 2019-08-31 RX ORDER — FLUTICASONE PROPIONATE 50 MCG
2 SPRAY, SUSPENSION (ML) NASAL DAILY
Status: DISCONTINUED | OUTPATIENT
Start: 2019-08-31 | End: 2019-09-05 | Stop reason: HOSPADM

## 2019-08-31 RX ORDER — INSULIN GLARGINE 100 [IU]/ML
30 INJECTION, SOLUTION SUBCUTANEOUS
Status: DISCONTINUED | OUTPATIENT
Start: 2019-08-31 | End: 2019-09-01

## 2019-08-31 RX ORDER — LISINOPRIL 20 MG/1
20 TABLET ORAL 2 TIMES DAILY
Status: DISCONTINUED | OUTPATIENT
Start: 2019-08-31 | End: 2019-09-05 | Stop reason: HOSPADM

## 2019-08-31 RX ORDER — LEVOFLOXACIN 5 MG/ML
750 INJECTION, SOLUTION INTRAVENOUS EVERY 24 HOURS
Status: DISCONTINUED | OUTPATIENT
Start: 2019-09-01 | End: 2019-09-04

## 2019-08-31 RX ORDER — SODIUM CHLORIDE 450 MG/100ML
100 INJECTION, SOLUTION INTRAVENOUS CONTINUOUS
Status: DISCONTINUED | OUTPATIENT
Start: 2019-08-31 | End: 2019-08-31

## 2019-08-31 RX ORDER — INSULIN LISPRO 100 [IU]/ML
INJECTION, SOLUTION INTRAVENOUS; SUBCUTANEOUS EVERY 6 HOURS
Status: DISCONTINUED | OUTPATIENT
Start: 2019-08-31 | End: 2019-08-31

## 2019-08-31 RX ADMIN — IPRATROPIUM BROMIDE AND ALBUTEROL SULFATE 3 ML: .5; 3 SOLUTION RESPIRATORY (INHALATION) at 19:34

## 2019-08-31 RX ADMIN — PANTOPRAZOLE SODIUM 40 MG: 40 TABLET, DELAYED RELEASE ORAL at 11:43

## 2019-08-31 RX ADMIN — SODIUM CHLORIDE, SODIUM LACTATE, POTASSIUM CHLORIDE, AND CALCIUM CHLORIDE 1000 ML: 600; 310; 30; 20 INJECTION, SOLUTION INTRAVENOUS at 11:49

## 2019-08-31 RX ADMIN — INSULIN LISPRO 9 UNITS: 100 INJECTION, SOLUTION INTRAVENOUS; SUBCUTANEOUS at 17:36

## 2019-08-31 RX ADMIN — IPRATROPIUM BROMIDE AND ALBUTEROL SULFATE 3 ML: .5; 3 SOLUTION RESPIRATORY (INHALATION) at 15:51

## 2019-08-31 RX ADMIN — INSULIN LISPRO 9 UNITS: 100 INJECTION, SOLUTION INTRAVENOUS; SUBCUTANEOUS at 12:35

## 2019-08-31 RX ADMIN — METHYLPREDNISOLONE SODIUM SUCCINATE 60 MG: 125 INJECTION, POWDER, FOR SOLUTION INTRAMUSCULAR; INTRAVENOUS at 20:24

## 2019-08-31 RX ADMIN — LEVOFLOXACIN 750 MG: 5 INJECTION, SOLUTION INTRAVENOUS at 23:43

## 2019-08-31 RX ADMIN — MONTELUKAST 10 MG: 10 TABLET, FILM COATED ORAL at 02:33

## 2019-08-31 RX ADMIN — METHYLPREDNISOLONE SODIUM SUCCINATE 60 MG: 125 INJECTION, POWDER, FOR SOLUTION INTRAMUSCULAR; INTRAVENOUS at 11:41

## 2019-08-31 RX ADMIN — HEPARIN SODIUM 5000 UNITS: 5000 INJECTION INTRAVENOUS; SUBCUTANEOUS at 02:28

## 2019-08-31 RX ADMIN — IPRATROPIUM BROMIDE AND ALBUTEROL SULFATE 3 ML: .5; 3 SOLUTION RESPIRATORY (INHALATION) at 07:10

## 2019-08-31 RX ADMIN — SODIUM CHLORIDE 100 ML/HR: 450 INJECTION, SOLUTION INTRAVENOUS at 02:47

## 2019-08-31 RX ADMIN — INSULIN GLARGINE 30 UNITS: 100 INJECTION, SOLUTION SUBCUTANEOUS at 02:25

## 2019-08-31 RX ADMIN — INSULIN LISPRO 12 UNITS: 100 INJECTION, SOLUTION INTRAVENOUS; SUBCUTANEOUS at 02:27

## 2019-08-31 RX ADMIN — INSULIN LISPRO 9 UNITS: 100 INJECTION, SOLUTION INTRAVENOUS; SUBCUTANEOUS at 22:22

## 2019-08-31 RX ADMIN — BUDESONIDE AND FORMOTEROL FUMARATE DIHYDRATE 2 PUFF: 160; 4.5 AEROSOL RESPIRATORY (INHALATION) at 19:35

## 2019-08-31 RX ADMIN — HEPARIN SODIUM 5000 UNITS: 5000 INJECTION INTRAVENOUS; SUBCUTANEOUS at 11:43

## 2019-08-31 RX ADMIN — IPRATROPIUM BROMIDE AND ALBUTEROL SULFATE 3 ML: .5; 3 SOLUTION RESPIRATORY (INHALATION) at 02:33

## 2019-08-31 RX ADMIN — MONTELUKAST 10 MG: 10 TABLET, FILM COATED ORAL at 22:00

## 2019-08-31 RX ADMIN — LEVOFLOXACIN 750 MG: 5 INJECTION, SOLUTION INTRAVENOUS at 00:04

## 2019-08-31 RX ADMIN — IPRATROPIUM BROMIDE AND ALBUTEROL SULFATE 3 ML: .5; 3 SOLUTION RESPIRATORY (INHALATION) at 11:45

## 2019-08-31 RX ADMIN — ALBUTEROL SULFATE 2.5 MG: 2.5 SOLUTION RESPIRATORY (INHALATION) at 10:26

## 2019-08-31 RX ADMIN — INSULIN GLARGINE 30 UNITS: 100 INJECTION, SOLUTION SUBCUTANEOUS at 22:21

## 2019-08-31 RX ADMIN — ASPIRIN 81 MG: 81 TABLET, COATED ORAL at 11:43

## 2019-08-31 RX ADMIN — HEPARIN SODIUM 5000 UNITS: 5000 INJECTION INTRAVENOUS; SUBCUTANEOUS at 17:36

## 2019-08-31 NOTE — H&P
Admission History and Physical  [facility]      Patient: Omar Garcia MRN: [id number]  [facility]: [id number]    YOB: 1960  Age: 60 y.o. Sex: female      DOA: 8/30/2019       HPI:     Omar Garcia is a 60 y.o. female with PMH  COPD on home O2, IDDM2, HTN, HFpEF, SHERRIE on CPAP, GERD, allergic rhinitis, now presenting with complaint of SOB. Patient reports feeling well until Thursday 8/29 when she developed a sore throat, sinus congestion, green rhinorrhea and cough productive of green sputum associated with increased SOB requiring use of duonebs at least every 3 hours and increased use of trelegy machine. Denies chest pain, palpitations or fevers/chills. Did report that her granddaughter was sick several weeks ago. No other complaints or concerns at this time. Two word dyspnea when speaking without bipap on. Notes that she normally only uses 2L O2 when active and normally only uses trelegy at night. Using 10mg prednisone every other day. ED Course: CXR unremarkable. Venous BG fairly unremarkable pH 7.446, pCO2 44, pO2 33. S/p albuterol x 2, solumedrol 125mg x1 and magnesium 2g x 1 in the ER. 500mL bolus NS in the ED. Required continuous bipap while in the ED d/t significant dyspnea. Review of Systems   Constitutional: Negative for chills and fever. HENT: Positive for congestion, sinus pain and sore throat. Negative for hearing loss and nosebleeds. Eyes: Negative for blurred vision and double vision. Respiratory: Positive for cough, sputum production, shortness of breath and wheezing. Cardiovascular: Negative for chest pain and palpitations. Gastrointestinal: Positive for nausea. Negative for abdominal pain, blood in stool, constipation, diarrhea and vomiting. Genitourinary: Negative for dysuria and frequency. Skin: Negative for rash. Neurological: Negative for dizziness, speech change, focal weakness and headaches.         Past Medical History:   Diagnosis Date    Asthma     Chronic lung disease     COPD     Cystocele, midline     Diabetes mellitus (HCC)     GERD (gastroesophageal reflux disease)     Hidradenitis suppurativa     Hyperlipidemia     Hypertension     SHERRIE on CPAP     CPAP    Stress incontinence        Past Surgical History:   Procedure Laterality Date    BREAST SURGERY PROCEDURE UNLISTED      Right breast benign tumor removal    HX APPENDECTOMY      HX CHOLECYSTECTOMY      HX DILATION AND CURETTAGE      Dysfunctional uterine bleeding, thought 2/2 fibroids    HX TUBAL LIGATION         Family History   Problem Relation Age of Onset    Hypertension Mother     Stroke Mother     Breast Cancer Mother         Bilateral mastectomies    Cancer Mother         ovarian and breast    Heart Failure Mother     Heart Attack Father         2011    Heart Surgery Father         CABG    Heart Failure Father     COPD Sister         Heavy smoker    Cancer Sister         ovarian    Heart Failure Sister     Lung Disease Sister     Asthma Child     Cancer Maternal Aunt         pancreatic    Cancer Maternal Grandfather         stomach       Social History     Socioeconomic History    Marital status: LEGALLY      Spouse name: Not on file    Number of children: Not on file    Years of education: Not on file    Highest education level: Not on file   Tobacco Use    Smoking status: Former Smoker     Packs/day: 1.00     Years: 30.00     Pack years: 30.00     Types: Cigarettes     Start date: 1966     Last attempt to quit: 2006     Years since quittin.9    Smokeless tobacco: Never Used    Tobacco comment: 1-1.5 packs per day   Substance and Sexual Activity    Alcohol use: No    Drug use: No    Sexual activity: Never       Allergies   Allergen Reactions    Ancef [Cefazolin] Hives    Contrast Agent [Iodine] Anaphylaxis, Shortness of Breath and Swelling     Needs pre-medication for IV contrast with Benadryl, Solu-Medrol    Fish Containing Products Anaphylaxis     Pt states she had a severe allergic reaction at 9 y/o.  Metformin Other (comments)     Abdominal pain, diarrhea.  Codeine Other (comments)     Altered mental status       Prior to Admission Medications   Prescriptions Last Dose Informant Patient Reported? Taking? HYPER-SAL 3.5 % nebu   Yes No   Sig: USE 1 VIAL IN NEBULIZER TWICE DAILY   NOVOLOG FLEXPEN U-100 INSULIN 100 unit/mL inpn  Self No Yes   Sig: Continue home Sliding scale insulin as prior to admission   Patient taking differently: 1 Units by SubCUTAneous route three (3) times daily. If BG <100=0u  101-150=5u  151-250=8u  251-300=12u  >300 call MD     OXYGEN-AIR DELIVERY SYSTEMS   Yes No   Si L by Nasal route continuous. First Choice   albuterol sulfate (PROVENTIL;VENTOLIN) 2.5 mg/0.5 mL nebu nebulizer solution   No Yes   Si.5 mL by Nebulization route four (4) times daily as needed for Wheezing. To be used with HyperSal nebulizer solution. ICD code: COPD J44.1   albuterol sulfate 90 mcg/actuation aepb   No Yes   Sig: Take 1 Puff by inhalation every four (4) hours. Patient taking differently: Take 1 Puff by inhalation every four (4) hours as needed. aspirin delayed-release 81 mg tablet   Yes Yes   Sig: Take 81 mg by mouth daily. cetirizine (ZYRTEC) 10 mg tablet   Yes Yes   Sig: Take 10 mg by mouth daily as needed for Allergies or Rhinitis. cpap machine kit   Yes No   Sig: by Does Not Apply route nightly. M.E.D.   famotidine (PEPCID) 20 mg tablet   Yes Yes   Sig: Take 20 mg by mouth two (2) times daily as needed for Other (reflux). fluticasone propion-salmeterol (ADVAIR HFA) 230-21 mcg/actuation inhaler   Yes Yes   Sig: Take 2 Puffs by inhalation two (2) times a day. fluticasone propionate (FLONASE ALLERGY RELIEF) 50 mcg/actuation nasal spray   Yes Yes   Si Sprays by Both Nostrils route daily.    hydroCHLOROthiazide (HYDRODIURIL) 12.5 mg tablet   Yes Yes   Sig: Take 12.5 mg by mouth daily. insulin glargine (LANTUS,BASAGLAR) 100 unit/mL (3 mL) inpn   No Yes   Si Units by SubCUTAneous route nightly. Patient taking differently: 30 Units by SubCUTAneous route nightly. lisinopril (PRINIVIL, ZESTRIL) 20 mg tablet   Yes Yes   Sig: Take 20 mg by mouth two (2) times a day. montelukast (SINGULAIR) 10 mg tablet   Yes Yes   Sig: Take 10 mg by mouth nightly. omeprazole (PRILOSEC) 40 mg capsule   Yes Yes   Sig: Take 40 mg by mouth daily. Indications: gastroesophageal reflux disease   predniSONE (DELTASONE) 10 mg tablet   No Yes   Si tablets every morning with breakfast for 10 days then 1 tablet every other morning with breakfast   tiotropium bromide (SPIRIVA RESPIMAT) 2.5 mcg/actuation inhaler   Yes Yes   Sig: Take 2 Puffs by inhalation daily. Facility-Administered Medications: None       Physical Exam:     Patient Vitals for the past 24 hrs:   Temp Pulse Resp BP SpO2   19 2330 -- -- -- -- 98 %   19 2315 -- 95 27 154/74 92 %   19 2308 98.3 °F (36.8 °C) -- -- -- --   19 2300 -- 95 26 140/69 97 %   19 2245 -- 96 28 156/57 96 %   19 2230 -- 95 28 165/64 99 %   19 2215 -- 96 19 144/64 99 %   190 -- 97 19 149/59 98 %   195 -- (!) 101 18 155/77 98 %   190 -- (!) 105 26 154/72 98 %   19 -- (!) 103 27 139/68 100 %   19 -- (!) 105 24 139/87 98 %   19 -- (!) 108 -- -- 98 %   19 -- (!) 111 24 -- 98 %   19 -- (!) 108 23 170/88 99 %   19 1950 98.3 °F (36.8 °C) (!) 111 27 -- 100 %       Physical Exam   Constitutional: She appears well-developed and well-nourished. No distress. HENT:   Head: Normocephalic and atraumatic. Eyes: Pupils are equal, round, and reactive to light. Right eye exhibits no discharge. Left eye exhibits no discharge. Cardiovascular: Normal rate and regular rhythm. Exam reveals no gallop and no friction rub.    No murmur heard.  Pulmonary/Chest: Effort normal. She has no rales. Decreased lung sounds throughout. 2 word dyspnea without bipap. Prolonged expiratory phase    Abdominal: Soft. Bowel sounds are normal. She exhibits no distension. There is no tenderness. There is no rebound and no guarding. Musculoskeletal:   Trace pedal edema    Neurological: She is alert. No cranial nerve deficit. Gross sensation normal throughout   Skin: Skin is warm and dry. No rash noted. She is not diaphoretic. No erythema. No pallor. Psychiatric: She has a normal mood and affect. Her behavior is normal. Judgment and thought content normal.       Recent Results (from the past 12 hour(s))   METABOLIC PANEL, COMPREHENSIVE    Collection Time: 08/30/19  8:05 PM   Result Value Ref Range    Sodium 142 136 - 145 mmol/L    Potassium 3.6 3.5 - 5.5 mmol/L    Chloride 103 100 - 111 mmol/L    CO2 31 21 - 32 mmol/L    Anion gap 8 3.0 - 18 mmol/L    Glucose 157 (H) 74 - 99 mg/dL    BUN 12 7.0 - 18 MG/DL    Creatinine 1.02 0.6 - 1.3 MG/DL    BUN/Creatinine ratio 12 12 - 20      GFR est AA >60 >60 ml/min/1.73m2    GFR est non-AA 55 (L) >60 ml/min/1.73m2    Calcium 9.5 8.5 - 10.1 MG/DL    Bilirubin, total 0.8 0.2 - 1.0 MG/DL    ALT (SGPT) 46 13 - 56 U/L    AST (SGOT) 23 10 - 38 U/L    Alk. phosphatase 101 45 - 117 U/L    Protein, total 7.9 6.4 - 8.2 g/dL    Albumin 3.9 3.4 - 5.0 g/dL    Globulin 4.0 2.0 - 4.0 g/dL    A-G Ratio 1.0 0.8 - 1.7     CBC WITH AUTOMATED DIFF    Collection Time: 08/30/19  8:05 PM   Result Value Ref Range    WBC 9.4 4.6 - 13.2 K/uL    RBC 4.61 4.20 - 5.30 M/uL    HGB 14.1 12.0 - 16.0 g/dL    HCT 40.3 35.0 - 45.0 %    MCV 87.4 74.0 - 97.0 FL    MCH 30.6 24.0 - 34.0 PG    MCHC 35.0 31.0 - 37.0 g/dL    RDW 13.5 11.6 - 14.5 %    PLATELET 818 133 - 460 K/uL    MPV 9.0 (L) 9.2 - 11.8 FL    NEUTROPHILS 68 40 - 73 %    LYMPHOCYTES 23 21 - 52 %    MONOCYTES 7 3 - 10 %    EOSINOPHILS 2 0 - 5 %    BASOPHILS 0 0 - 2 %    ABS.  NEUTROPHILS 6.4 1.8 - 8.0 K/UL    ABS. LYMPHOCYTES 2.2 0.9 - 3.6 K/UL    ABS. MONOCYTES 0.7 0.05 - 1.2 K/UL    ABS. EOSINOPHILS 0.2 0.0 - 0.4 K/UL    ABS. BASOPHILS 0.0 0.0 - 0.1 K/UL    DF AUTOMATED     CARDIAC PANEL,(CK, CKMB & TROPONIN)    Collection Time: 08/30/19  8:05 PM   Result Value Ref Range     26 - 192 U/L    CK - MB <1.0 <3.6 ng/ml    CK-MB Index  0.0 - 4.0 %     CALCULATION NOT PERFORMED WHEN RESULT IS BELOW LINEAR LIMIT    Troponin-I, QT <0.02 0.0 - 0.045 NG/ML   POC LACTIC ACID    Collection Time: 08/30/19  8:14 PM   Result Value Ref Range    Lactic Acid (POC) 2.17 (HH) 0.40 - 2.00 mmol/L   POC VENOUS BLOOD GAS    Collection Time: 08/30/19  8:26 PM   Result Value Ref Range    Device: NASAL CANNULA      Flow rate (POC) 3 L/M    pH, venous (POC) 7.446 (H) 7.32 - 7.42      pCO2, venous (POC) 44.0 41 - 51 MMHG    pO2, venous (POC) 33 25 - 40 mmHg    HCO3, venous (POC) 30.3 (H) 23.0 - 28.0 MMOL/L    sO2, venous (POC) 66 65 - 88 %    Base excess, venous (POC) 5 mmol/L    Allens test (POC) N/A      Total resp.  rate 18      Site OTHER      Specimen type (POC) VENOUS BLOOD      Performed by Jessica Grossman W/ [employer] MICROSCOP[employer]    Collection Time: 08/30/19  9:17 PM   Result Value Ref Range    Color DARK YELLOW      Appearance CLOUDY      Specific gravity 1.030 1.005 - 1.030      pH (UA) 5.0 5.0 - 8.0      Protein 30 (A) NEG mg/dL    Glucose NEGATIVE  NEG mg/dL    Ketone TRACE (A) NEG mg/dL    Bilirubin NEGATIVE  NEG      Blood NEGATIVE  NEG      Urobilinogen 1.0 0.2 - 1.0 EU/dL    Nitrites NEGATIVE  NEG      Leukocyte Esterase NEGATIVE  NEG     URINE MICROSCOPIC ONLY    Collection Time: 08/30/19  9:17 PM   Result Value Ref Range    WBC 4 to 11 0 - 4 /hpf    RBC NEGATIVE  0 - 5 /hpf    Epithelial cells 4+ 0 - 5 /lpf    Bacteria 3+ (A) NEG /hpf    Mucus 3+ (A) NEG /lpf         Assessment/Plan:   60 y.o. female with PMH significant for COPD on home O2, IDDM2, HTN, HFpEF, SHERRIE on CPAP, GERD, allergic rhinitis now admitted with COPD exacerbation. COPD exacerbation patient with severe COPD on 2L of O2 at home when active + trelegy at night and PRN as well as prednisone 10mg every other day. Follows with Dr. Evens Waddell as an outpatient. Patient with a one day history of increasing SOB + cough productive of green sputum, required continuous bipap in the ER. Lactic acid slightly elevated at 2.17, but no leukocytosis or left shift to suggest sepsis picture. Slight tachycardia and tachypnea on arrival resolved following appropriate respiratory treatments. CXR unremarkable. Venous BG fairly unremarkable pH 7.446, pCO2 44, pO2 33. S/p albuterol x 2, solumedrol 125mg x1 and magnesium 2g x 1 in the ER. 500mL bolus in the ED. Many admissions within the last year with the most recent 6/30/2019.   - Fu blood cx, respiratory cx  - FU repeat lactic acid   - Levaquin 750mg QD - reviewed available data on up to date at higher risk for pseudomonas infection/colonization given recent hospitalization and abx use and levaquin is an appropriate abx for this indication   - Scheduled duonebs Q4H  - Solumedrol 60mg BID for now, suggest a slow taper.   - Home trelegy PRN and QHS   - Goal SpO2 should be between 88-92% in this patient with severe COPD  - Continue home singular  - Hold home spiriva while on scheduled duoneb treatments  - Sub home advair per pharmacy recs  - Daily CBC, BMP   - PT/OT/CM  - Consider pulm consult if not able to come off bipap     0224 Addendum - Lactic acidosis noted lactic acid 3.57, will trend lactic acid Q 4H. Patient overall very well appearing, do not believe this fits in with a septic picture at this time, will defer additional fluids for now and monitor very closely. Most likely d/t probable hypoxia prior to admission    ? Bactiurea vs UTI patient with no frequency, dysuria but 3+ bacteria noted on UA. No nitrites or LE.   - Will culture.  ABX as above for COPD exacerbation     IDDM2 moderately controleld with last A1C 8.1 7/2019.  Taking lantus 30 units QHS, does note that her blood sugars elevate significantly with steroid therapy  - SSI Q 6H for now   - Lantus 30 QHS for now  - Diabetic diet   - Will monitor BS closely    HTN blood pressure stable in the ER 's  - Continue home lisinopril 20mg BID   - Continue home HCTZ 12.5 QD    HFpEF last echo with EF 66-70% 7/2018, noted prior history of diastolic dysfunction.   - Continue home Aspirin for now  - Monitor for fluid overload  - I&O  - Daily weights     GERD well controlled  - Will give IV protonix for now with high dose steroid and increase risk for bleeding     Allergic rhinitis  - Continue home flonase  - Hold home cetirizine     Diet: cardiac  DVT Prophylaxis: heparin  Code Status: full  Point of Contact: Macarena Alvarado (daughter) [phone]    Disposition and anticipated LOS: >2mnt    Noy Cespedes MD  PGY-3 [facility]  08/31/19 12:31 AM

## 2019-08-31 NOTE — ED TRIAGE NOTES
Patient has had increasing shortness of breath since yesterday. Patient has had a productive cough with green sputum since yesterday. Home oxygen at 2L via nasal canula wasn't helping enough so patient was using her home CPAP and HHN treatments with limited success.

## 2019-08-31 NOTE — DISCHARGE SUMMARY
Discharge Summary  [facility]      Patient: Wang Conley Age: 60 y.o. Sex: female  : 1960    MRN: [id number]      Admission Date: 2019      Discharge Date: 19      Attending:Brandon Pedro MD      PCP: Aracelis Riley MD        ================================================================    Reason for Admission:   COPD exacerbation [facility]) [J44.1]    Discharge Diagnoses:   COPD exacerbation  Lactic acidosis  IDDM2  HTN  GERD  Allergic rhinitis     Important notes to PCP/ follow-up studies and evaluations   adhearance to home med regimen  Consider pulm rehab  Would benefit from PPSV 23, Tdap, zoster  Prednisone taper: Prednisone 60mg BID for now, suggest a slow taper. Prednisone 50mg BID 9/3-,  Prednisone 40mg BID -,  Prednisone 30mg BID -,  Prednisone 20mg BID -9/10, 19 Prednisone 10mg every other day which was her home regiment prior to admit. Pending labs and studies:  none    Operative Procedures:   none    Discharge Medications:     Current Discharge Medication List      START taking these medications    Details   ! ! predniSONE (DELTASONE) 20 mg tablet Take 40 mg by mouth daily (with breakfast). Qty: 2 Tab, Refills: 0      !! predniSONE (DELTASONE) 10 mg tablet Take 30 mg by mouth daily (with breakfast). Qty: 4 Tab, Refills: 0      !! predniSONE (DELTASONE) 20 mg tablet Take 20 mg by mouth daily (with breakfast). Qty: 4 Tab, Refills: 0       !! - Potential duplicate medications found. Please discuss with provider. CONTINUE these medications which have NOT CHANGED    Details   ! ! predniSONE (DELTASONE) 10 mg tablet 4 tablets every morning with breakfast for 10 days then 1 tablet every other morning with breakfast  Qty: 50 Tab, Refills: 2      lisinopril (PRINIVIL, ZESTRIL) 20 mg tablet Take 20 mg by mouth two (2) times a day.       albuterol sulfate (PROVENTIL;VENTOLIN) 2.5 mg/0.5 mL nebu nebulizer solution 0.5 mL by Nebulization route four (4) times daily as needed for Wheezing. To be used with HyperSal nebulizer solution. ICD code: COPD J44.1  Qty: 60 mL, Refills: 3      cetirizine (ZYRTEC) 10 mg tablet Take 10 mg by mouth daily as needed for Allergies or Rhinitis. fluticasone propionate (FLONASE ALLERGY RELIEF) 50 mcg/actuation nasal spray 2 Sprays by Both Nostrils route daily. fluticasone propion-salmeterol (ADVAIR HFA) 230-21 mcg/actuation inhaler Take 2 Puffs by inhalation two (2) times a day. hydroCHLOROthiazide (HYDRODIURIL) 12.5 mg tablet Take 12.5 mg by mouth daily. tiotropium bromide (SPIRIVA RESPIMAT) 2.5 mcg/actuation inhaler Take 2 Puffs by inhalation daily. insulin glargine (LANTUS,BASAGLAR) 100 unit/mL (3 mL) inpn 35 Units by SubCUTAneous route nightly. Qty: 28 Units, Refills: 0      albuterol sulfate 90 mcg/actuation aepb Take 1 Puff by inhalation every four (4) hours. Qty: 1 Inhaler, Refills: 0      NOVOLOG FLEXPEN U-100 INSULIN 100 unit/mL inpn Continue home Sliding scale insulin as prior to admission  Qty: 1 Pen, Refills: 0      omeprazole (PRILOSEC) 40 mg capsule Take 40 mg by mouth daily. Indications: gastroesophageal reflux disease      aspirin delayed-release 81 mg tablet Take 81 mg by mouth daily. famotidine (PEPCID) 20 mg tablet Take 20 mg by mouth two (2) times daily as needed for Other (reflux). montelukast (SINGULAIR) 10 mg tablet Take 10 mg by mouth nightly. HYPER-SAL 3.5 % nebu USE 1 VIAL IN NEBULIZER TWICE DAILY  Refills: 6      cpap machine kit by Does Not Apply route nightly. M.E.D. OXYGEN-AIR DELIVERY SYSTEMS 2 L by Nasal route continuous. First Choice       ! ! - Potential duplicate medications found. Please discuss with provider.             Disposition: Home    Consultants:    none    Brief Hospital Course (including pertinent history and physical findings)  Lana Aguilera is a 60 y.o. female with PMH  COPD on home O2, IDDM2, HTN, HFpEF, SHERRIE on CPAP, GERD, allergic rhinitis, now presenting with complaint of SOB.     Patient reports feeling well until Thursday 8/29 when she developed a sore throat, sinus congestion, green rhinorrhea and cough productive of green sputum associated with increased SOB requiring use of duonebs at least every 3 hours and increased use of trelegy machine. Denies chest pain, palpitations or fevers/chills. Did report that her granddaughter was sick several weeks ago. No other complaints or concerns at this time. Two word dyspnea when speaking without bipap on. Notes that she normally only uses 2L O2 when active and normally only uses trelegy at night. Using 10mg prednisone every other day.      ED Course:   CXR was obtained and results unremarkable. Venous BG  Was fairly unremarkable pH 7.446, pCO2 44, pO2 33. Pt was given the following medicaitons S/p albuterol x 2, solumedrol 125mg x1 and magnesium 2g x 1 in the ER. 500mL bolus NS in the ED. Required continuous bipap while in the ED d/t significant dyspnea. Pt was then admitted to the step down unit. Was on 2 liters O2 NC. Pt was transitioned to oral medications on 9/1/19. Pt stated that she would not like a long taper of the steroids as it cause her to have change in psych status. A ten day taper was dicussed with pt and pt agreed after demonstrating the importance of steroid taper. Pt was transferred to the general medical floor 9/3/19. Steroid taper started 9/3/19: Prednisone 50mg BID 9/3-9/4,  Prednisone 40mg BID 9/5-9/6,  Prednisone 30mg BID 9/7-9/8,  Prednisone 20mg BID 9/9-9/10, 9/11/19 Prednisone 10mg which was her home regiment prior to admit.     Summarized key findings and results (labs, imaging studies, ECHO, cardiac cath, endoscopies, etc):    Recent Results (from the past 24 hour(s))   GLUCOSE, POC    Collection Time: 09/04/19  4:28 PM   Result Value Ref Range    Glucose (POC) 321 (H) 70 - 110 mg/dL   GLUCOSE, POC    Collection Time: 09/04/19  8:59 PM   Result Value Ref Range    Glucose (POC) 239 (H) 70 - 110 mg/dL   CBC WITH AUTOMATED DIFF    Collection Time: 09/05/19  2:30 AM   Result Value Ref Range    WBC 9.8 4.6 - 13.2 K/uL    RBC 4.16 (L) 4.20 - 5.30 M/uL    HGB 12.4 12.0 - 16.0 g/dL    HCT 36.8 35.0 - 45.0 %    MCV 88.5 74.0 - 97.0 FL    MCH 29.8 24.0 - 34.0 PG    MCHC 33.7 31.0 - 37.0 g/dL    RDW 13.3 11.6 - 14.5 %    PLATELET 045 547 - 014 K/uL    MPV 8.9 (L) 9.2 - 11.8 FL    NEUTROPHILS 67 40 - 73 %    LYMPHOCYTES 26 21 - 52 %    MONOCYTES 7 3 - 10 %    EOSINOPHILS 0 0 - 5 %    BASOPHILS 0 0 - 2 %    ABS. NEUTROPHILS 6.6 1.8 - 8.0 K/UL    ABS. LYMPHOCYTES 2.5 0.9 - 3.6 K/UL    ABS. MONOCYTES 0.6 0.05 - 1.2 K/UL    ABS. EOSINOPHILS 0.0 0.0 - 0.4 K/UL    ABS.  BASOPHILS 0.0 0.0 - 0.1 K/UL    DF AUTOMATED     METABOLIC PANEL, BASIC    Collection Time: 09/05/19  2:30 AM   Result Value Ref Range    Sodium 136 136 - 145 mmol/L    Potassium 3.7 3.5 - 5.5 mmol/L    Chloride 98 (L) 100 - 111 mmol/L    CO2 33 (H) 21 - 32 mmol/L    Anion gap 5 3.0 - 18 mmol/L    Glucose 182 (H) 74 - 99 mg/dL    BUN 19 (H) 7.0 - 18 MG/DL    Creatinine 1.01 0.6 - 1.3 MG/DL    BUN/Creatinine ratio 19 12 - 20      GFR est AA >60 >60 ml/min/1.73m2    GFR est non-AA 56 (L) >60 ml/min/1.73m2    Calcium 8.8 8.5 - 10.1 MG/DL   GLUCOSE, POC    Collection Time: 09/05/19  8:44 AM   Result Value Ref Range    Glucose (POC) 105 70 - 110 mg/dL   GLUCOSE, POC    Collection Time: 09/05/19 11:41 AM   Result Value Ref Range    Glucose (POC) 169 (H) 70 - 110 mg/dL         Functional status and cognitive function:    Ambulates with independence  Status: alert, cooperative, no distress, appears stated age    Diet:  regular    Code status and advanced care plan: Full  [facility] Contact: Ny Solomon (daughter) [phone]    Patient Education:  Patient was educated on the following topics prior to discharge:  COPD symptoms and treatment plan    Follow-up:   Follow-up Information     Follow up With Specialties Details Why Contact Info    In, [facility], [address]  [phone]            ================================================================  [facility]

## 2019-08-31 NOTE — ED NOTES
Patient resting quietly at this time with no signs of distress noted. Cardiac monitor, BP monitor and pulse ox remain intact. IV insertion with with no redness or edema noted. HOB elevated and call bell within reach. Will continue to monitor for any change in condition.

## 2019-08-31 NOTE — PROGRESS NOTES
Progress Note  [facility]       Patient: Taya Costa MRN: [id number]  CSN: [id number]    YOB: 1960  Age: 60 y.o. Sex: female    DOA: 8/30/2019 LOS:  LOS: 0 days                    Subjective:     Acute events overnight:  Endorses SOB     Review of Systems   Constitutional: Positive for malaise/fatigue. Negative for chills and fever. Respiratory: Positive for cough and sputum production. Cardiovascular: Negative for chest pain and palpitations. Gastrointestinal: Negative for abdominal pain, nausea and vomiting. Musculoskeletal: Negative for joint pain and myalgias. Skin: Negative for itching. Neurological: Negative for tingling and headaches. Endo/Heme/Allergies: Does not bruise/bleed easily. Psychiatric/Behavioral: Negative for depression and suicidal ideas.        Objective:      Patient Vitals for the past 24 hrs:   Temp Pulse Resp BP SpO2   08/31/19 1028 -- -- -- -- 97 %   08/31/19 0800 97.8 °F (36.6 °C) 94 22 -- 95 %   08/31/19 0745 -- 92 19 141/52 99 %   08/31/19 0730 -- 96 20 138/72 94 %   08/31/19 0715 -- 84 26 123/44 99 %   08/31/19 0700 -- 86 19 143/50 97 %   08/31/19 0645 -- 83 19 136/52 97 %   08/31/19 0630 -- 83 18 140/67 99 %   08/31/19 0615 -- 85 18 141/70 98 %   08/31/19 0600 -- 86 17 130/55 98 %   08/31/19 0545 -- 89 23 125/61 96 %   08/31/19 0530 -- 87 17 104/46 97 %   08/31/19 0515 -- 92 19 129/51 93 %   08/31/19 0500 -- 93 -- 135/57 96 %   08/31/19 0445 -- 90 -- 130/68 97 %   08/31/19 0430 -- 91 -- 126/55 97 %   08/31/19 0415 -- 92 -- 120/58 96 %   08/31/19 0400 -- 92 -- 117/51 97 %   08/31/19 0350 -- -- -- -- 96 %   08/31/19 0345 -- 93 -- 126/43 97 %   08/31/19 0331 97.9 °F (36.6 °C) -- -- -- --   08/31/19 0330 97.9 °F (36.6 °C) 94 14 157/50 97 %   08/31/19 0315 -- 91 14 125/67 98 %   08/31/19 0300 -- 96 -- 129/71 97 %   08/31/19 0245 -- 88 22 -- 99 %   08/31/19 0230 -- 88 20 133/64 98 %   08/31/19 0215 -- 87 23 119/55 97 % 08/31/19 0200 -- 92 25 140/66 94 %   08/31/19 0145 -- 91 25 132/42 95 %   08/31/19 0130 -- 92 23 127/63 99 %   08/31/19 0115 -- 92 18 127/85 98 %   08/31/19 0100 -- 90 22 150/82 98 %   08/31/19 0045 -- 92 24 149/82 98 %   08/31/19 0030 -- 95 22 149/78 99 %   08/31/19 0000 -- -- -- 141/68 --   08/30/19 2345 -- 99 -- 157/88 99 %   08/30/19 2330 -- 93 -- 141/55 98 %   08/30/19 2315 -- 95 27 154/74 92 %   08/30/19 2308 98.3 °F (36.8 °C) -- -- -- --   08/30/19 2300 -- 95 26 140/69 97 %   08/30/19 2245 -- 96 28 156/57 96 %   08/30/19 2230 -- 95 28 165/64 99 %   08/30/19 2215 -- 96 19 144/64 99 %   08/30/19 2200 -- 97 19 149/59 98 %   08/30/19 2145 -- (!) 101 18 155/77 98 %   08/30/19 2130 -- (!) 105 26 154/72 98 %   08/30/19 2100 -- (!) 103 27 139/68 100 %   08/30/19 2045 -- (!) 105 24 139/87 98 %   08/30/19 2030 -- (!) 108 -- -- 98 %   08/30/19 2015 -- (!) 111 24 -- 98 %   08/30/19 2000 -- (!) 108 23 170/88 99 %   08/30/19 1950 98.3 °F (36.8 °C) (!) 111 27 -- 100 %         Intake/Output Summary (Last 24 hours) at 8/31/2019 1033  Last data filed at 8/31/2019 0138  Gross per 24 hour   Intake 1200 ml   Output --   Net 1200 ml       Physical Exam   Constitutional: She is oriented to person, place, and time. She appears well-developed and well-nourished. No distress. HENT:   Head: Normocephalic and atraumatic. Eyes: Conjunctivae and EOM are normal. Right eye exhibits no discharge. Left eye exhibits no discharge. Neck: Normal range of motion. Neck supple. No tracheal deviation present. No thyromegaly present. Cardiovascular: Normal rate, regular rhythm and intact distal pulses. Pulmonary/Chest: No respiratory distress. She has wheezes. She has rales. She exhibits no tenderness. Abdominal: Soft. Bowel sounds are normal. She exhibits no distension. Musculoskeletal: Normal range of motion. She exhibits no edema or deformity. Neurological: She is alert and oriented to person, place, and time. No sensory deficit. She exhibits normal muscle tone. Skin: Skin is warm and dry. Capillary refill takes less than 2 seconds. She is not diaphoretic. No erythema. No pallor. Psychiatric: She has a normal mood and affect.  Her behavior is normal. Thought content normal.       Lab/Data Reviewed:  BMP:   Lab Results   Component Value Date/Time     08/30/2019 08:05 PM    K 3.6 08/30/2019 08:05 PM     08/30/2019 08:05 PM    CO2 31 08/30/2019 08:05 PM    AGAP 8 08/30/2019 08:05 PM     (H) 08/30/2019 08:05 PM    BUN 12 08/30/2019 08:05 PM    CREA 1.02 08/30/2019 08:05 PM    GFRAA >60 08/30/2019 08:05 PM    GFRNA 55 (L) 08/30/2019 08:05 PM     CMP:   Lab Results   Component Value Date/Time     08/30/2019 08:05 PM    K 3.6 08/30/2019 08:05 PM     08/30/2019 08:05 PM    CO2 31 08/30/2019 08:05 PM    AGAP 8 08/30/2019 08:05 PM     (H) 08/30/2019 08:05 PM    BUN 12 08/30/2019 08:05 PM    CREA 1.02 08/30/2019 08:05 PM    GFRAA >60 08/30/2019 08:05 PM    GFRNA 55 (L) 08/30/2019 08:05 PM    CA 9.5 08/30/2019 08:05 PM    ALB 3.9 08/30/2019 08:05 PM    TP 7.9 08/30/2019 08:05 PM    GLOB 4.0 08/30/2019 08:05 PM    AGRAT 1.0 08/30/2019 08:05 PM    SGOT 23 08/30/2019 08:05 PM    ALT 46 08/30/2019 08:05 PM     CBC:   Lab Results   Component Value Date/Time    WBC 9.2 08/31/2019 09:47 AM    HGB 12.4 08/31/2019 09:47 AM    HCT 36.2 08/31/2019 09:47 AM     08/31/2019 09:47 AM     All Cardiac Markers in the last 24 hours:   Lab Results   Component Value Date/Time     08/30/2019 08:05 PM    CKMB <1.0 08/30/2019 08:05 PM    CKND1  08/30/2019 08:05 PM     CALCULATION NOT PERFORMED WHEN RESULT IS BELOW LINEAR LIMIT    Vola Havers <0.02 08/30/2019 08:05 PM     Recent Glucose Results:   Lab Results   Component Value Date/Time     (H) 08/30/2019 08:05 PM     ABG:   Lab Results   Component Value Date/Time    PHI 7.363 08/31/2019 08:40 AM    PCO2I 41.8 08/31/2019 08:40 AM    PO2I 106 (H) 08/31/2019 08:40 AM HCO3I 23.8 08/31/2019 08:40 AM     COAGS: No results found for: APTT, PTP, INR  Liver Panel:   Lab Results   Component Value Date/Time    ALB 3.9 08/30/2019 08:05 PM    TP 7.9 08/30/2019 08:05 PM    GLOB 4.0 08/30/2019 08:05 PM    AGRAT 1.0 08/30/2019 08:05 PM    SGOT 23 08/30/2019 08:05 PM    ALT 46 08/30/2019 08:05 PM     08/30/2019 08:05 PM     Pancreatic Markers: No results found for: AMYLPOCT, AML, LIPPOCT, LPSE     Scheduled Medications Reviewed:  Current Facility-Administered Medications   Medication Dose Route Frequency    aspirin delayed-release tablet 81 mg  81 mg Oral DAILY    budesonide-formoterol (SYMBICORT) 160-4.5 mcg/actuation HFA inhaler 2 Puff  2 Puff Inhalation BID RT    fluticasone propionate (FLONASE) 50 mcg/actuation nasal spray 2 Spray  2 Spray Both Nostrils DAILY    insulin glargine (LANTUS) injection 30 Units  30 Units SubCUTAneous QHS    [Held by provider] lisinopril (PRINIVIL, ZESTRIL) tablet 20 mg  20 mg Oral BID    montelukast (SINGULAIR) tablet 10 mg  10 mg Oral QHS    heparin (porcine) injection 5,000 Units  5,000 Units SubCUTAneous Q8H    albuterol-ipratropium (DUO-NEB) 2.5 MG-0.5 MG/3 ML  3 mL Nebulization Q4H RT    [START ON 9/1/2019] levoFLOXacin (LEVAQUIN) 750 mg in D5W IVPB  750 mg IntraVENous Q24H    methylPREDNISolone (PF) (Solu-MEDROL) injection 60 mg  60 mg IntraVENous Q12H    insulin lispro (HUMALOG) injection   SubCUTAneous Q6H    [Held by provider] hydroCHLOROthiazide (MICROZIDE) capsule 12.5 mg  12.5 mg Oral DAILY    0.45% sodium chloride infusion  100 mL/hr IntraVENous CONTINUOUS    lactated Ringers infusion 1,000 mL  1,000 mL IntraVENous ONCE    pantoprazole (PROTONIX) tablet 40 mg  40 mg Oral ACB       Imaging, microbiology, and EKG/Telemetry:  Results     Procedure Component Value Units Date/Time    CULTURE, RESPIRATORY/SPUTUM/BRONCH Robert Victor Hugo [[id number]]     Order Status:  Sent Specimen:  Sputum     CULTURE, URINE [[id number]] Collected: 08/30/19 2117    Order Status:  Completed Specimen:  Urine from Clean catch Updated:  08/31/19 0924    CULTURE, BLOOD [[id number]] Collected:  08/30/19 2018    Order Status:  Completed Specimen:  Blood Updated:  08/31/19 0626     Special Requests: NO SPECIAL REQUESTS        Culture result: NO GROWTH AFTER 9 HOURS       CULTURE, BLOOD [[id number]] Collected:  08/30/19 2005    Order Status:  Completed Specimen:  Blood Updated:  08/31/19 0626     Special Requests: NO SPECIAL REQUESTS        Culture result: NO GROWTH AFTER 9 HOURS               Assessment/Plan     60 y.o. female with PMH significant for COPD on home O2, IDDM2, HTN, HFpEF, SHERRIE on CPAP, GERD, allergic rhinitis now admitted with COPD exacerbation.     COPD exacerbation patient with severe COPD on 2L of O2 at home when active + trelegy at night and PRN as well as prednisone 10mg every other day. Follows with Dr. Leda Gallegos as an outpatient. Patient with a one day history of increasing SOB + cough productive of green sputum, required continuous bipap in the ER. Lactic acid slightly elevated at 2.17, but no leukocytosis or left shift to suggest sepsis picture. Slight tachycardia and tachypnea on arrival resolved following appropriate respiratory treatments. CXR unremarkable. Venous BG fairly unremarkable pH 7.446, pCO2 44, pO2 33. S/p albuterol x 2, solumedrol 125mg x1 and magnesium 2g x 1 in the ER. 500mL bolus in the ED.  Many admissions within the last year with the most recent 6/30/2019.   - Fu blood cx, respiratory cx  - FU repeat lactic acid   - Levaquin 750mg QD - reviewed available data on up to date at higher risk for pseudomonas infection/colonization given recent hospitalization and abx use and levaquin is an appropriate abx for this indication   - Scheduled duonebs Q4H  - Solumedrol 60mg BID for now, suggest a slow taper.   - Home trelegy PRN and QHS   - Goal SpO2 should be between 88-92% in this patient with severe COPD  - Continue home singular  - Hold home spiriva while on scheduled duoneb treatments  - Sub home advair per pharmacy recs  - Daily CBC, BMP   - PT/OT/CM  - Consider pulm consult if not able to come off bipap   Incentive spirometery in place, pt educated on how to use device,   [place] coughing technique taught, pt taught back.      1934 Addendum - Lactic acidosis noted lactic acid 3.57, will trend lactic acid Q 4H. Patient overall very well appearing, do not believe this fits in with a septic picture at this time, will defer additional fluids for now and monitor very closely. Most likely d/t probable hypoxia prior to admission     ?Bactiurea vs UTI patient with no frequency, dysuria but 3+ bacteria noted on UA. No nitrites or LE.   - Will culture. ABX as above for COPD exacerbation      IDDM2 moderately controleld with last A1C 8.1 7/2019.  Taking lantus 30 units QHS, does note that her blood sugars elevate significantly with steroid therapy  - SSI Q 6H for now   - Lantus 30 QHS for now  - Diabetic diet   - Will monitor BS closely     HTN blood pressure stable in the ER 's  - Continue home lisinopril 20mg BID   - Continue home HCTZ 12.5 QD     HFpEF last echo with EF 66-70% 7/2018, noted prior history of diastolic dysfunction.   - Continue home Aspirin for now  - Monitor for fluid overload  - I&O  - Daily weights      GERD well controlled  - Will give IV protonix for now with high dose steroid and increase risk for bleeding      Allergic rhinitis  - Continue home flonase  - Hold home cetirizine      Diet: cardiac  DVT Prophylaxis: heparin  Code Status: full  Point of Contact: Thomas Albert (daughter) [phone]     Disposition and anticipated LOS: >2mnt    Rock Estela AUSTIN PGY1  [facility]

## 2019-08-31 NOTE — PROGRESS NOTES
Brief note -     Received call from nursing, lactic acid increase to 3.83. Patient sleeping on exam with bipap, but awoke easily to verbal stimuli. Reports feeling improved since last exam.     Physical exam improved from prior, with wheezes throughout (rather than decreased airflow throughout), no rhonchi or rhales on pulmonary exam. Slightly dry mucous membranes on exam.     Plan:  - Will order 1000 mL LR bolus now   - Will continue to monitor this patient closely   - Patient continues to have no other si/sx of sepsis, suspect lactic acidosis of other origin - will FU on ABG which may help differentiate other etiologies.      Marty Johnson MD PGY-3  [facility]

## 2019-08-31 NOTE — ED PROVIDER NOTES
EMERGENCY DEPARTMENT HISTORY AND PHYSICAL EXAM    8:11 PM      Date: 8/30/2019  Patient Name: Katalina James    History of Presenting Illness     Chief Complaint   Patient presents with    Respiratory Distress         History Provided By: Patient      Additional History (Context): Katalina James is a 60 y.o. female with PMHx of hypertension, COPD, diabetes, hyperlipidemia, and asthma who presents with worsening shortness of breath since yesterday. Associated symptoms include green productive cough. Patient has used her nebulizer without relief. She is taking steroids 10mg every other day. She is normally on 2L O2 at home. She was last admitted for COPD in July 2019. No other concerns or symptoms at this time.     PCP: Dulce Maria Mccarthy MD    Chief Complaint: Shortness of breath  Duration:  \"yesterday\"  Timing:  Worsening  Location: N/A  Quality: N/A  Severity: N/A  Modifying Factors: nebulizer with no relief, 2L O2 at home, steroids 10 mg every other day  Associated Symptoms: cough    Current Facility-Administered Medications   Medication Dose Route Frequency Provider Last Rate Last Dose    aspirin delayed-release tablet 81 mg  81 mg Oral DAILY Lexx Arce MD        budesonide-formoterol (SYMBICORT) 160-4.5 mcg/actuation HFA inhaler 2 Puff  2 Puff Inhalation BID RT Lexx Arce MD        fluticasone propionate (FLONASE) 50 mcg/actuation nasal spray 2 Spray  2 Spray Both Nostrils DAILY Lexx Arce MD        insulin glargine (LANTUS) injection 30 Units  30 Units SubCUTAneous QHS Sánchez HE MD   30 Units at 08/31/19 0225    lisinopril (PRINIVIL, ZESTRIL) tablet 20 mg  20 mg Oral BID Lexx Arce MD        montelukast (SINGULAIR) tablet 10 mg  10 mg Oral QHS Sánchez HE MD   10 mg at 08/31/19 0233    heparin (porcine) injection 5,000 Units  5,000 Units SubCUTAneous Q8H Lexx Arce MD   5,000 Units at 08/31/19 0228    albuterol-ipratropium (DUO-NEB) 2.5 MG-0.5 MG/3 ML  3 mL Nebulization Q4H RT Fabby HE MD   3 mL at 08/31/19 0233    [START ON 9/1/2019] levoFLOXacin (LEVAQUIN) 750 mg in D5W IVPB  750 mg IntraVENous Q24H Liudmila Glover MD        pantoprazole (PROTONIX) 40 mg in sodium chloride 0.9% 10 mL injection  40 mg IntraVENous DAILY Liudmila Glover MD        methylPREDNISolone (PF) (Solu-MEDROL) injection 60 mg  60 mg IntraVENous Q12H Liudmila Glover MD        insulin lispro (HUMALOG) injection   SubCUTAneous Q6H Liudmila Glover MD   12 Units at 08/31/19 3177    glucose chewable tablet 16 g  4 Tab Oral PRN Liudmila Glover MD        glucagon [facility]) injection 1 mg  1 mg IntraMUSCular PRN Liudmila Glover MD        dextrose (D50W) injection syrg 12.5-25 g  25-50 mL IntraVENous PRN Liudmila Glover MD        hydroCHLOROthiazide (MICROZIDE) capsule 12.5 mg  12.5 mg Oral DAILY Liudmila Glover MD        0.45% sodium chloride infusion  100 mL/hr IntraVENous CONTINUOUS Liudmila Glover MD        sodium chloride (NS) flush 5-10 mL  5-10 mL IntraVENous PRN Bigg Gresham A, DO         Current Outpatient Medications   Medication Sig Dispense Refill    predniSONE (DELTASONE) 10 mg tablet 4 tablets every morning with breakfast for 10 days then 1 tablet every other morning with breakfast 50 Tab 2    lisinopril (PRINIVIL, ZESTRIL) 20 mg tablet Take 20 mg by mouth two (2) times a day.  albuterol sulfate (PROVENTIL;VENTOLIN) 2.5 mg/0.5 mL nebu nebulizer solution 0.5 mL by Nebulization route four (4) times daily as needed for Wheezing. To be used with HyperSal nebulizer solution. ICD code: COPD J44.1 60 mL 3    cetirizine (ZYRTEC) 10 mg tablet Take 10 mg by mouth daily as needed for Allergies or Rhinitis.  fluticasone propionate (FLONASE ALLERGY RELIEF) 50 mcg/actuation nasal spray 2 Sprays by Both Nostrils route daily.       fluticasone propion-salmeterol (ADVAIR HFA) 071-47 mcg/actuation inhaler Take 2 Puffs by inhalation two (2) times a day.  hydroCHLOROthiazide (HYDRODIURIL) 12.5 mg tablet Take 12.5 mg by mouth daily.  tiotropium bromide (SPIRIVA RESPIMAT) 2.5 mcg/actuation inhaler Take 2 Puffs by inhalation daily.  insulin glargine (LANTUS,BASAGLAR) 100 unit/mL (3 mL) inpn 35 Units by SubCUTAneous route nightly. (Patient taking differently: 30 Units by SubCUTAneous route nightly.) 28 Units 0    albuterol sulfate 90 mcg/actuation aepb Take 1 Puff by inhalation every four (4) hours. (Patient taking differently: Take 1 Puff by inhalation every four (4) hours as needed.) 1 Inhaler 0    NOVOLOG FLEXPEN U-100 INSULIN 100 unit/mL inpn Continue home Sliding scale insulin as prior to admission (Patient taking differently: 1 Units by SubCUTAneous route three (3) times daily. If BG <100=0u  101-150=5u  151-250=8u  251-300=12u  >300 call MD  ) 1 Pen 0    omeprazole (PRILOSEC) 40 mg capsule Take 40 mg by mouth daily. Indications: gastroesophageal reflux disease      aspirin delayed-release 81 mg tablet Take 81 mg by mouth daily.  famotidine (PEPCID) 20 mg tablet Take 20 mg by mouth two (2) times daily as needed for Other (reflux).  montelukast (SINGULAIR) 10 mg tablet Take 10 mg by mouth nightly.  HYPER-SAL 3.5 % nebu USE 1 VIAL IN NEBULIZER TWICE DAILY  6    cpap machine kit by Does Not Apply route nightly. M.E.D.      OXYGEN-AIR DELIVERY SYSTEMS 2 L by Nasal route continuous.  [facility]         Past History     Past Medical History:  Past Medical History:   Diagnosis Date    Asthma     Chronic lung disease     COPD     Cystocele, midline     Diabetes mellitus ([address]     GERD (gastroesophageal reflux disease)     Hidradenitis suppurativa     Hyperlipidemia     Hypertension     SHERRIE on CPAP     CPAP    Stress incontinence        Past Surgical History:  Past Surgical History:   Procedure Laterality Date    BREAST SURGERY PROCEDURE UNLISTED      Right breast benign tumor removal    HX APPENDECTOMY      HX CHOLECYSTECTOMY      HX DILATION AND CURETTAGE      Dysfunctional uterine bleeding, thought 2/2 fibroids    HX TUBAL LIGATION         Family History:  Family History   Problem Relation Age of Onset    Hypertension Mother     Stroke Mother     Breast Cancer Mother         Bilateral mastectomies    Cancer Mother         ovarian and breast    Heart Failure Mother     Heart Attack Father         2011    Heart Surgery Father         CABG    Heart Failure Father     COPD Sister         Heavy smoker    Cancer Sister         ovarian    Heart Failure Sister     Lung Disease Sister     Asthma Child     Cancer Maternal Aunt         pancreatic    Cancer Maternal Grandfather         stomach       Social History:  Social History     Tobacco Use    Smoking status: Former Smoker     Packs/day: 1.00     Years: 30.00     Pack years: 30.00     Types: Cigarettes     Start date: 1966     Last attempt to quit: 2006     Years since quittin.9    Smokeless tobacco: Never Used    Tobacco comment: 1-1.5 packs per day   Substance Use Topics    Alcohol use: No    Drug use: No       Allergies: Allergies   Allergen Reactions    Ancef [Cefazolin] Hives    Contrast Agent [Iodine] Anaphylaxis, Shortness of Breath and Swelling     Needs pre-medication for IV contrast with Benadryl, Solu-Medrol    Fish Containing Products Anaphylaxis     Pt states she had a severe allergic reaction at 7 y/o.  Metformin Other (comments)     Abdominal pain, diarrhea.  Codeine Other (comments)     Altered mental status         Review of Systems       Review of Systems   Constitutional: Negative for activity change, fatigue and fever. HENT: Negative for congestion and rhinorrhea. Eyes: Negative for visual disturbance. Respiratory: Positive for cough and shortness of breath. Cardiovascular: Negative for chest pain and palpitations.    Gastrointestinal: Negative for abdominal pain, diarrhea, nausea and vomiting. Genitourinary: Negative for dysuria and hematuria. Musculoskeletal: Negative for back pain. Skin: Negative for rash. Neurological: Negative for dizziness, weakness and light-headedness. All other systems reviewed and are negative. Physical Exam     Visit Vitals  /63   Pulse 92   Temp 98.3 °F (36.8 °C)   Resp 23   Ht 5' 3\" (1.6 m)   Wt 112 kg (247 lb)   SpO2 99%   BMI 43.75 kg/m²         Physical Exam   Constitutional: She is oriented to person, place, and time. She appears well-developed and well-nourished. She appears distressed. HENT:   Head: Normocephalic and atraumatic. Right Ear: External ear normal.   Left Ear: External ear normal.   Nose: Nose normal.   Dry MM   Eyes: Pupils are equal, round, and reactive to light. Conjunctivae and EOM are normal.   Neck: Normal range of motion. Neck supple. No tracheal deviation present. Cardiovascular: Normal rate, regular rhythm and intact distal pulses. Pulmonary/Chest: She is in respiratory distress. She has wheezes. She exhibits no tenderness. Abdominal: Soft. Bowel sounds are normal. She exhibits no distension. There is no tenderness. There is no rebound and no guarding. Musculoskeletal: Normal range of motion. She exhibits no edema or tenderness. Neurological: She is alert and oriented to person, place, and time. No cranial nerve deficit. Coordination normal.   Skin: Skin is warm and dry. Psychiatric: She has a normal mood and affect. Her behavior is normal. Judgment and thought content normal.   Nursing note and vitals reviewed.         Diagnostic Study Results     Labs -  Recent Results (from the past 12 hour(s))   METABOLIC PANEL, COMPREHENSIVE    Collection Time: 08/30/19  8:05 PM   Result Value Ref Range    Sodium 142 136 - 145 mmol/L    Potassium 3.6 3.5 - 5.5 mmol/L    Chloride 103 100 - 111 mmol/L    CO2 31 21 - 32 mmol/L    Anion gap 8 3.0 - 18 mmol/L    Glucose 157 (H) 74 - 99 mg/dL    BUN 12 7.0 - 18 MG/DL    Creatinine 1.02 0.6 - 1.3 MG/DL    BUN/Creatinine ratio 12 12 - 20      GFR est AA >60 >60 ml/min/1.73m2    GFR est non-AA 55 (L) >60 ml/min/1.73m2    Calcium 9.5 8.5 - 10.1 MG/DL    Bilirubin, total 0.8 0.2 - 1.0 MG/DL    ALT (SGPT) 46 13 - 56 U/L    AST (SGOT) 23 10 - 38 U/L    Alk. phosphatase 101 45 - 117 U/L    Protein, total 7.9 6.4 - 8.2 g/dL    Albumin 3.9 3.4 - 5.0 g/dL    Globulin 4.0 2.0 - 4.0 g/dL    A-G Ratio 1.0 0.8 - 1.7     CBC WITH AUTOMATED DIFF    Collection Time: 08/30/19  8:05 PM   Result Value Ref Range    WBC 9.4 4.6 - 13.2 K/uL    RBC 4.61 4.20 - 5.30 M/uL    HGB 14.1 12.0 - 16.0 g/dL    HCT 40.3 35.0 - 45.0 %    MCV 87.4 74.0 - 97.0 FL    MCH 30.6 24.0 - 34.0 PG    MCHC 35.0 31.0 - 37.0 g/dL    RDW 13.5 11.6 - 14.5 %    PLATELET 387 701 - 769 K/uL    MPV 9.0 (L) 9.2 - 11.8 FL    NEUTROPHILS 68 40 - 73 %    LYMPHOCYTES 23 21 - 52 %    MONOCYTES 7 3 - 10 %    EOSINOPHILS 2 0 - 5 %    BASOPHILS 0 0 - 2 %    ABS. NEUTROPHILS 6.4 1.8 - 8.0 K/UL    ABS. LYMPHOCYTES 2.2 0.9 - 3.6 K/UL    ABS. MONOCYTES 0.7 0.05 - 1.2 K/UL    ABS. EOSINOPHILS 0.2 0.0 - 0.4 K/UL    ABS.  BASOPHILS 0.0 0.0 - 0.1 K/UL    DF AUTOMATED     CARDIAC PANEL,(CK, CKMB & TROPONIN)    Collection Time: 08/30/19  8:05 PM   Result Value Ref Range     26 - 192 U/L    CK - MB <1.0 <3.6 ng/ml    CK-MB Index  0.0 - 4.0 %     CALCULATION NOT PERFORMED WHEN RESULT IS BELOW LINEAR LIMIT    Troponin-I, QT <0.02 0.0 - 0.045 NG/ML   POC LACTIC ACID    Collection Time: 08/30/19  8:14 PM   Result Value Ref Range    Lactic Acid (POC) 2.17 (HH) 0.40 - 2.00 mmol/L   POC VENOUS BLOOD GAS    Collection Time: 08/30/19  8:26 PM   Result Value Ref Range    Device: NASAL CANNULA      Flow rate (POC) 3 L/M    pH, venous (POC) 7.446 (H) 7.32 - 7.42      pCO2, venous (POC) 44.0 41 - 51 MMHG    pO2, venous (POC) 33 25 - 40 mmHg    HCO3, venous (POC) 30.3 (H) 23.0 - 28.0 MMOL/L    sO2, venous (POC) 66 65 - 88 %    Base excess, venous (POC) 5 mmol/L    Allens test (POC) N/A      Total resp. rate 18      Site OTHER      Specimen type (POC) VENOUS BLOOD      Performed by Hayley Nation W/ RFLX MICROSCOPIC    Collection Time: 08/30/19  9:17 PM   Result Value Ref Range    Color DARK YELLOW      Appearance CLOUDY      Specific gravity 1.030 1.005 - 1.030      pH (UA) 5.0 5.0 - 8.0      Protein 30 (A) NEG mg/dL    Glucose NEGATIVE  NEG mg/dL    Ketone TRACE (A) NEG mg/dL    Bilirubin NEGATIVE  NEG      Blood NEGATIVE  NEG      Urobilinogen 1.0 0.2 - 1.0 EU/dL    Nitrites NEGATIVE  NEG      Leukocyte Esterase NEGATIVE  NEG     URINE MICROSCOPIC ONLY    Collection Time: 08/30/19  9:17 PM   Result Value Ref Range    WBC 4 to 11 0 - 4 /hpf    RBC NEGATIVE  0 - 5 /hpf    Epithelial cells 4+ 0 - 5 /lpf    Bacteria 3+ (A) NEG /hpf    Mucus 3+ (A) NEG /lpf   POC LACTIC ACID    Collection Time: 08/31/19  2:15 AM   Result Value Ref Range    Lactic Acid (POC) 3.57 (HH) 0.40 - 2.00 mmol/L   GLUCOSE, POC    Collection Time: 08/31/19  2:18 AM   Result Value Ref Range    Glucose (POC) 302 (H) 70 - 110 mg/dL       Radiologic Studies -   XR CHEST PORT    (Results Pending)         Medical Decision Making     It should be noted that Veronica DIXON DO will be the provider of record for this patient. I reviewed the vital signs, available nursing notes, past medical history, past surgical history, family history and social history. Vital Signs-Reviewed the patient's vital signs. Pulse Oximetry Analysis -  3L of O2 via NC , nml    Cardiac Monitor:  Rate: 110 BPM, tachycardic    EKG: Interpreted by the EP.    Time Interpreted: 20:55   Rate: 101 BPM   Rhythm: Sinus Tachycardia    Interpretation: No acute changes    Records Reviewed: Nursing Notes and Old Medical Records (Time of Review: 8:11 PM)    Orders Placed This Encounter    SEPSIS BUNDLE INITIATED IN ED (REQUIRED)    CULTURE, BLOOD    CULTURE, BLOOD    CULTURE, URINE    CULTURE, RESPIRATORY/SPUTUM/BRONCH W GRAM STAIN    XR CHEST PORT    LACTIC ACID    URINALYSIS W/ RFLX MICROSCOPIC    METABOLIC PANEL, COMPREHENSIVE    CBC WITH AUTOMATED DIFF    CARDIAC PANEL,(CK, CKMB & TROPONIN)    URINE MICROSCOPIC ONLY    CBC WITH AUTOMATED DIFF    METABOLIC PANEL, BASIC    LACTIC ACID    LACTIC ACID    BLOOD GAS, ARTERIAL    DIET DIABETIC CONSISTENT CARB Regular; No Conc. Sweets    POC LACTIC ACID    VITAL SIGNS - PER UNIT ROUTINE    STRICT I & O    NEUROLOGIC STATUS ASSESSMENT - PER UNIT ROUTINE    VITAL SIGNS PER UNIT ROUTINE    AMBULATE WITH ASSISTANCE    WEIGH PATIENT    NOTIFY PROVIDER: VITAL SIGNS CHANGES    NOTIFY PROVIDER:  ADMIT STATUS    INTAKE AND OUTPUT    NURSING-MISCELLANEOUS: SEE HYPOGLYCEMIA PROTOCOL BELOW HYPOGLYCEMIA PROTOCOL: Initiate Hypoglycemia protocol if blood glucose is less than 70 mg/dL FOR CONSCIOUS PATIENT: Administer 4 ounces fruit juice OR regular soda OR 4 glucose tablets. FOR UN. ..    NURSING-MISCELLANEOUS: Blood glucose targets -- ICU: 140 - 180 mg/dL;  NON-ICU: 100 - 140 mg/dL CONTINUOUS    FULL CODE    RT--BIPAP    OXIMETRY, SPOT CHECK    NON-INVASIVE POSITIVE PRESSURE VENTILATION    POC LACTIC ACID    POC VENOUS BLOOD GAS    POC LACTIC ACID    GLUCOSE, POC    EKG, 12 LEAD, INITIAL    EKG, 12 LEAD, INITIAL    SALINE LOCK IV ONE TIME STAT    albuterol-ipratropium (DUO-NEB) 2.5 MG-0.5 MG/3 ML    methylPREDNISolone (PF) (Solu-MEDROL) injection 125 mg    magnesium sulfate 2 g/50 ml IVPB (premix or compounded)    sodium chloride (NS) flush 5-10 mL    sodium chloride 0.9 % bolus infusion 500 mL    DISCONTD: predniSONE (DELTASONE) 10 mg tablet    albuterol-ipratropium (DUO-NEB) 2.5 MG-0.5 MG/3 ML    levoFLOXacin (LEVAQUIN) 750 mg in D5W IVPB    aspirin delayed-release tablet 81 mg    budesonide-formoterol (SYMBICORT) 160-4.5 mcg/actuation HFA inhaler 2 Puff    fluticasone propionate (FLONASE) 50 mcg/actuation nasal spray 2 Spray  DISCONTD: hydroCHLOROthiazide (HYDRODIURIL) tablet 12.5 mg    insulin glargine (LANTUS) injection 30 Units    lisinopril (PRINIVIL, ZESTRIL) tablet 20 mg    montelukast (SINGULAIR) tablet 10 mg    DISCONTD: pantoprazole (PROTONIX) tablet 40 mg    heparin (porcine) injection 5,000 Units    albuterol-ipratropium (DUO-NEB) 2.5 MG-0.5 MG/3 ML    DISCONTD: methylPREDNISolone (PF) (SOLU-MEDROL) injection 60 mg    levoFLOXacin (LEVAQUIN) 750 mg in D5W IVPB    pantoprazole (PROTONIX) 40 mg in sodium chloride 0.9% 10 mL injection    methylPREDNISolone (PF) (Solu-MEDROL) injection 60 mg    insulin lispro (HUMALOG) injection    glucose chewable tablet 16 g    glucagon (GLUCAGEN) injection 1 mg    dextrose (D50W) injection syrg 12.5-25 g    hydroCHLOROthiazide (MICROZIDE) capsule 12.5 mg    0.45% sodium chloride infusion    INITIAL PHYSICIAN ORDER: INPATIENT Stepdown; 3. Patient receiving treatment that can only be provided in an inpatient setting (further clarification in H&P documentation)       ED Course: Progress Notes, Reevaluation, and Consults:  8:11 PM  Respiratory was paged. 8:15 PM  RT at bedside to place patient on BiPAP.    11:20 PM  Patient re-evaluated. Another Duo-neb treatment ordered. C/w  Wheezing after nebs, solumedrol, Mg    11:53 PM  Consult:  Discussed care with [facility] Resident. Standard discussion; including history of patients chief complaint, available diagnostic results, and treatment course. Will evaluate the patient. Provider Notes (Medical Decision Making):   Patient is a 9year-old female who comes in with respiratory distress and wheezing throughout. She is a history of COPD. Patient given nebulizer treatments, Solu-Medrol, magnesium, and placed on BiPAP. She is still wheezing after 3 hours. Discussed with family medicine and will be admitted to their service. Patient is agreeable to this plan. Stable at the time of admission.   Of note, she keeps taking her BiPAP machine off and puts the machine on stand by herself. This is likely why her lactic is risen. Critical Care Time:  The services I provided to this patient were to treat and/or prevent clinically significant deterioration that could result in the failure of one or more body systems and/or organ systems due to respiratory distress. Services included the following:  -reviewing nursing notes and old charts  -vital sign assessments  -direct patient care  -medication orders and management  -interpreting and reviewing diagnostic studies/labs  -re-evaluations  -documentation time    Aggregate critical care time was 35 minutes, which includes only time during which I was engaged in work directly related to the patient's care as described above, whether I was at bedside or elsewhere in the Emergency Department. It did not include time spent performing other reported procedures or the services of residents, students, nurses, or advance practice providers. Rachel Matta DO    2:45 AM                   For Hospitalized Patients:    Hospitalization Decision Time:  The decision to hospitalize the patient was made by Dr. Hai Blanco at 11:43 PM on 8/30/2019    Diagnosis     Clinical Impression:   1. Acute exacerbation of chronic obstructive pulmonary disease (COPD) ([facility] [address]    2. Respiratory distress        Disposition: Admitted    Follow-up Information    None          Patient's Medications   Start Taking    No medications on file   Continue Taking    ALBUTEROL SULFATE (PROVENTIL;VENTOLIN) 2.5 MG/0.5 ML NEBU NEBULIZER SOLUTION    0.5 mL by Nebulization route four (4) times daily as needed for Wheezing. To be used with HyperSal nebulizer solution. ICD code: COPD J44.1    ALBUTEROL SULFATE 90 MCG/ACTUATION AEPB    Take 1 Puff by inhalation every four (4) hours. ASPIRIN DELAYED-RELEASE 81 MG TABLET    Take 81 mg by mouth daily.     CETIRIZINE (ZYRTEC) 10 MG TABLET    Take 10 mg by mouth daily as needed for Allergies or Rhinitis. CPAP MACHINE KIT    by Does Not Apply route nightly. M.E.D. FAMOTIDINE (PEPCID) 20 MG TABLET    Take 20 mg by mouth two (2) times daily as needed for Other (reflux). FLUTICASONE PROPION-SALMETEROL (ADVAIR HFA) 230-21 MCG/ACTUATION INHALER    Take 2 Puffs by inhalation two (2) times a day. FLUTICASONE PROPIONATE (FLONASE ALLERGY RELIEF) 50 MCG/ACTUATION NASAL SPRAY    2 Sprays by Both Nostrils route daily. HYDROCHLOROTHIAZIDE (HYDRODIURIL) 12.5 MG TABLET    Take 12.5 mg by mouth daily. HYPER-SAL 3.5 % NEBU    USE 1 VIAL IN NEBULIZER TWICE DAILY    INSULIN GLARGINE (LANTUS,BASAGLAR) 100 UNIT/ML (3 ML) INPN    35 Units by SubCUTAneous route nightly. LISINOPRIL (PRINIVIL, ZESTRIL) 20 MG TABLET    Take 20 mg by mouth two (2) times a day. MONTELUKAST (SINGULAIR) 10 MG TABLET    Take 10 mg by mouth nightly. NOVOLOG FLEXPEN U-100 INSULIN 100 UNIT/ML INPN    Continue home Sliding scale insulin as prior to admission    OMEPRAZOLE (PRILOSEC) 40 MG CAPSULE    Take 40 mg by mouth daily. Indications: gastroesophageal reflux disease    OXYGEN-AIR DELIVERY SYSTEMS    2 L by Nasal route continuous. [facility]    PREDNISONE (DELTASONE) 10 MG TABLET    4 tablets every morning with breakfast for 10 days then 1 tablet every other morning with breakfast    TIOTROPIUM BROMIDE (SPIRIVA RESPIMAT) 2.5 MCG/ACTUATION INHALER    Take 2 Puffs by inhalation daily. These Medications have changed    No medications on file   Stop Taking    LISINOPRIL (PRINIVIL, ZESTRIL) 40 MG TABLET    Take 20 mg by mouth two (2) times a day. Indications: high blood pressure    ONDANSETRON (ZOFRAN ODT) 8 MG DISINTEGRATING TABLET    Take 1 Tab by mouth every eight (8) hours as needed for Nausea. PREDNISONE (DELTASONE) 10 MG TABLET    Take 10 mg by mouth every fourty-eight (48) hours. RANITIDINE (ZANTAC) 150 MG TABLET    Take 150 mg by mouth two (2) times daily as needed for Indigestion. SIMETHICONE (MYLICON) 80 MG CHEWABLE TABLET    Take 80 mg by mouth every six (6) hours as needed for Flatulence.     _______________________________       Katrin Sampson acting as a scribe for and in the presence of Mayur Valiente DO      August 31, 2019 at 2:46 AM       Provider Attestation:      I personally performed the services described in the documentation, reviewed the documentation, as recorded by the scribe in my presence, and it accurately and completely records my words and actions.  August 31, 2019 at 2:46 AM - Mayur HE DO       _______________________________

## 2019-08-31 NOTE — ROUTINE PROCESS
Received pt sitting on side bed of receiving HHN treatmen  watching tV. Voiced no complaints. Call bell within reach. Bed low and locked. Will continue to monitor. 46 paged [place] [facility] pratice  to inform of increase  lactic of 4.0. Dr Tan De La Garza. No new order at this time     Bedside and Verbal shift change report given to [id number] Sukhdeep Tao (oncoming nurse) by Jojo Kim RN   (offgoing nurse). Report included the following information SBAR, Kardex, Intake/Output and MAR.

## 2019-08-31 NOTE — ROUTINE PROCESS
Bedside and Verbal shift change report given to Layla Maria (oncoming nurse) by Thien VALDEZ (offgoing nurse). Report included the following information SBAR, Kardex, Intake/Output, MAR, Recent Results and Cardiac Rhythm NSR.    0815 Pt arrived to floor. Pt is stable with Bipap at bedside. Respiratory at bedside. Pt currently on L via nasal cannula. 1330 Pt requested to be put on Bipap. Pt resting in bed taking a nap with Bipap in place. Pt is stable. Will continue to monitor. 1645 Pt sitting at edge of bed eating dinner. Family at bedside. Will continue to monitor. Bedside and Verbal shift change report given to [address] [place] [address] Sera (oncoming nurse) by Layla Maria (offgoing nurse). Report included the following information SBAR, Kardex, Intake/Output, MAR, Recent Results and Cardiac Rhythm NSR.

## 2019-08-31 NOTE — ED NOTES
patient sitting on edge of the bed with oxygen at 2L via nasal canula. Patient remains short of breath with activity.

## 2019-09-01 LAB
ANION GAP SERPL CALC-SCNC: 9 MMOL/L (ref 3–18)
ARTERIAL PATENCY WRIST A: YES
BACTERIA SPEC CULT: NORMAL
BASE EXCESS BLD CALC-SCNC: 1 MMOL/L
BASOPHILS # BLD: 0 K/UL (ref 0–0.1)
BASOPHILS NFR BLD: 0 % (ref 0–2)
BDY SITE: NORMAL
BODY TEMPERATURE: 98
BUN SERPL-MCNC: 16 MG/DL (ref 7–18)
BUN/CREAT SERPL: 14 (ref 12–20)
CALCIUM SERPL-MCNC: 9 MG/DL (ref 8.5–10.1)
CHLORIDE SERPL-SCNC: 102 MMOL/L (ref 100–111)
CK MB CFR SERPL CALC: 1.3 % (ref 0–4)
CK MB SERPL-MCNC: 1.2 NG/ML (ref 5–25)
CK SERPL-CCNC: 95 U/L (ref 26–192)
CO2 SERPL-SCNC: 25 MMOL/L (ref 21–32)
CREAT SERPL-MCNC: 1.15 MG/DL (ref 0.6–1.3)
DIFFERENTIAL METHOD BLD: ABNORMAL
EOSINOPHIL # BLD: 0 K/UL (ref 0–0.4)
EOSINOPHIL NFR BLD: 0 % (ref 0–5)
ERYTHROCYTE [DISTWIDTH] IN BLOOD BY AUTOMATED COUNT: 13.5 % (ref 11.6–14.5)
GAS FLOW.O2 O2 DELIVERY SYS: NORMAL L/MIN
GAS FLOW.O2 SETTING OXYMISER: 2 L/M
GLUCOSE BLD STRIP.AUTO-MCNC: 244 MG/DL (ref 70–110)
GLUCOSE BLD STRIP.AUTO-MCNC: 282 MG/DL (ref 70–110)
GLUCOSE BLD STRIP.AUTO-MCNC: 297 MG/DL (ref 70–110)
GLUCOSE BLD STRIP.AUTO-MCNC: 365 MG/DL (ref 70–110)
GLUCOSE SERPL-MCNC: 347 MG/DL (ref 74–99)
HCO3 BLD-SCNC: 26 MMOL/L (ref 22–26)
HCT VFR BLD AUTO: 36.4 % (ref 35–45)
HGB BLD-MCNC: 12 G/DL (ref 12–16)
LACTATE SERPL-SCNC: 3.4 MMOL/L (ref 0.4–2)
LACTATE SERPL-SCNC: 4 MMOL/L (ref 0.4–2)
LACTATE SERPL-SCNC: 4.4 MMOL/L (ref 0.4–2)
LACTATE SERPL-SCNC: 4.6 MMOL/L (ref 0.4–2)
LACTATE SERPL-SCNC: 4.6 MMOL/L (ref 0.4–2)
LYMPHOCYTES # BLD: 0.6 K/UL (ref 0.9–3.6)
LYMPHOCYTES NFR BLD: 6 % (ref 21–52)
MCH RBC QN AUTO: 29.4 PG (ref 24–34)
MCHC RBC AUTO-ENTMCNC: 33 G/DL (ref 31–37)
MCV RBC AUTO: 89.2 FL (ref 74–97)
MONOCYTES # BLD: 0.2 K/UL (ref 0.05–1.2)
MONOCYTES NFR BLD: 2 % (ref 3–10)
NEUTS SEG # BLD: 10 K/UL (ref 1.8–8)
NEUTS SEG NFR BLD: 92 % (ref 40–73)
O2/TOTAL GAS SETTING VFR VENT: 28 %
PCO2 BLD: 43.7 MMHG (ref 35–45)
PH BLD: 7.38 [PH] (ref 7.35–7.45)
PLATELET # BLD AUTO: 257 K/UL (ref 135–420)
PMV BLD AUTO: 9.1 FL (ref 9.2–11.8)
PO2 BLD: 97 MMHG (ref 80–100)
POTASSIUM SERPL-SCNC: 4.1 MMOL/L (ref 3.5–5.5)
RBC # BLD AUTO: 4.08 M/UL (ref 4.2–5.3)
SAO2 % BLD: 97 % (ref 92–97)
SERVICE CMNT-IMP: NORMAL
SERVICE CMNT-IMP: NORMAL
SODIUM SERPL-SCNC: 136 MMOL/L (ref 136–145)
SPECIMEN TYPE: NORMAL
TOTAL RESP. RATE, ITRR: 20
TROPONIN I SERPL-MCNC: <0.02 NG/ML (ref 0–0.04)
WBC # BLD AUTO: 10.8 K/UL (ref 4.6–13.2)

## 2019-09-01 PROCEDURE — 94762 N-INVAS EAR/PLS OXIMTRY CONT: CPT

## 2019-09-01 PROCEDURE — 74011250636 HC RX REV CODE- 250/636: Performed by: STUDENT IN AN ORGANIZED HEALTH CARE EDUCATION/TRAINING PROGRAM

## 2019-09-01 PROCEDURE — 80048 BASIC METABOLIC PNL TOTAL CA: CPT

## 2019-09-01 PROCEDURE — 36600 WITHDRAWAL OF ARTERIAL BLOOD: CPT

## 2019-09-01 PROCEDURE — 82962 GLUCOSE BLOOD TEST: CPT

## 2019-09-01 PROCEDURE — 94640 AIRWAY INHALATION TREATMENT: CPT

## 2019-09-01 PROCEDURE — 83605 ASSAY OF LACTIC ACID: CPT

## 2019-09-01 PROCEDURE — 74011636637 HC RX REV CODE- 636/637: Performed by: FAMILY MEDICINE

## 2019-09-01 PROCEDURE — 77010033678 HC OXYGEN DAILY

## 2019-09-01 PROCEDURE — 74011636637 HC RX REV CODE- 636/637: Performed by: STUDENT IN AN ORGANIZED HEALTH CARE EDUCATION/TRAINING PROGRAM

## 2019-09-01 PROCEDURE — 65660000004 HC RM CVT STEPDOWN

## 2019-09-01 PROCEDURE — 74011250637 HC RX REV CODE- 250/637: Performed by: STUDENT IN AN ORGANIZED HEALTH CARE EDUCATION/TRAINING PROGRAM

## 2019-09-01 PROCEDURE — 82550 ASSAY OF CK (CPK): CPT

## 2019-09-01 PROCEDURE — 36415 COLL VENOUS BLD VENIPUNCTURE: CPT

## 2019-09-01 PROCEDURE — 74011000250 HC RX REV CODE- 250: Performed by: STUDENT IN AN ORGANIZED HEALTH CARE EDUCATION/TRAINING PROGRAM

## 2019-09-01 PROCEDURE — 85025 COMPLETE CBC W/AUTO DIFF WBC: CPT

## 2019-09-01 PROCEDURE — 82803 BLOOD GASES ANY COMBINATION: CPT

## 2019-09-01 RX ORDER — PREDNISONE 20 MG/1
60 TABLET ORAL
Status: DISCONTINUED | OUTPATIENT
Start: 2019-09-01 | End: 2019-09-02

## 2019-09-01 RX ORDER — DEXTROSE MONOHYDRATE 100 MG/ML
125-250 INJECTION, SOLUTION INTRAVENOUS AS NEEDED
Status: DISCONTINUED | OUTPATIENT
Start: 2019-09-01 | End: 2019-09-05 | Stop reason: HOSPADM

## 2019-09-01 RX ORDER — INSULIN GLARGINE 100 [IU]/ML
35 INJECTION, SOLUTION SUBCUTANEOUS
Status: DISCONTINUED | OUTPATIENT
Start: 2019-09-01 | End: 2019-09-03

## 2019-09-01 RX ADMIN — BUDESONIDE AND FORMOTEROL FUMARATE DIHYDRATE 2 PUFF: 160; 4.5 AEROSOL RESPIRATORY (INHALATION) at 20:20

## 2019-09-01 RX ADMIN — PANTOPRAZOLE SODIUM 40 MG: 40 TABLET, DELAYED RELEASE ORAL at 08:12

## 2019-09-01 RX ADMIN — IPRATROPIUM BROMIDE AND ALBUTEROL SULFATE 3 ML: .5; 3 SOLUTION RESPIRATORY (INHALATION) at 20:18

## 2019-09-01 RX ADMIN — INSULIN LISPRO 9 UNITS: 100 INJECTION, SOLUTION INTRAVENOUS; SUBCUTANEOUS at 11:16

## 2019-09-01 RX ADMIN — PREDNISONE 60 MG: 20 TABLET ORAL at 17:03

## 2019-09-01 RX ADMIN — IPRATROPIUM BROMIDE AND ALBUTEROL SULFATE 3 ML: .5; 3 SOLUTION RESPIRATORY (INHALATION) at 15:52

## 2019-09-01 RX ADMIN — INSULIN GLARGINE 35 UNITS: 100 INJECTION, SOLUTION SUBCUTANEOUS at 21:24

## 2019-09-01 RX ADMIN — INSULIN LISPRO 9 UNITS: 100 INJECTION, SOLUTION INTRAVENOUS; SUBCUTANEOUS at 21:24

## 2019-09-01 RX ADMIN — HEPARIN SODIUM 5000 UNITS: 5000 INJECTION INTRAVENOUS; SUBCUTANEOUS at 03:00

## 2019-09-01 RX ADMIN — FLUTICASONE PROPIONATE 2 SPRAY: 50 SPRAY, METERED NASAL at 08:10

## 2019-09-01 RX ADMIN — IPRATROPIUM BROMIDE AND ALBUTEROL SULFATE 3 ML: .5; 3 SOLUTION RESPIRATORY (INHALATION) at 23:48

## 2019-09-01 RX ADMIN — ASPIRIN 81 MG: 81 TABLET, COATED ORAL at 08:11

## 2019-09-01 RX ADMIN — INSULIN LISPRO 6 UNITS: 100 INJECTION, SOLUTION INTRAVENOUS; SUBCUTANEOUS at 08:15

## 2019-09-01 RX ADMIN — BUDESONIDE AND FORMOTEROL FUMARATE DIHYDRATE 2 PUFF: 160; 4.5 AEROSOL RESPIRATORY (INHALATION) at 08:40

## 2019-09-01 RX ADMIN — METHYLPREDNISOLONE SODIUM SUCCINATE 60 MG: 125 INJECTION, POWDER, FOR SOLUTION INTRAMUSCULAR; INTRAVENOUS at 08:12

## 2019-09-01 RX ADMIN — HEPARIN SODIUM 5000 UNITS: 5000 INJECTION INTRAVENOUS; SUBCUTANEOUS at 11:16

## 2019-09-01 RX ADMIN — IPRATROPIUM BROMIDE AND ALBUTEROL SULFATE 3 ML: .5; 3 SOLUTION RESPIRATORY (INHALATION) at 14:35

## 2019-09-01 RX ADMIN — IPRATROPIUM BROMIDE AND ALBUTEROL SULFATE 3 ML: .5; 3 SOLUTION RESPIRATORY (INHALATION) at 00:05

## 2019-09-01 RX ADMIN — IPRATROPIUM BROMIDE AND ALBUTEROL SULFATE 3 ML: .5; 3 SOLUTION RESPIRATORY (INHALATION) at 04:03

## 2019-09-01 RX ADMIN — IPRATROPIUM BROMIDE AND ALBUTEROL SULFATE 3 ML: .5; 3 SOLUTION RESPIRATORY (INHALATION) at 08:40

## 2019-09-01 RX ADMIN — INSULIN LISPRO 15 UNITS: 100 INJECTION, SOLUTION INTRAVENOUS; SUBCUTANEOUS at 17:19

## 2019-09-01 RX ADMIN — MONTELUKAST 10 MG: 10 TABLET, FILM COATED ORAL at 21:24

## 2019-09-01 RX ADMIN — HEPARIN SODIUM 5000 UNITS: 5000 INJECTION INTRAVENOUS; SUBCUTANEOUS at 17:04

## 2019-09-01 NOTE — PROGRESS NOTES
Progress Note  [facility]       Patient: Charlotte Galindo MRN: [id number]  CSN: [id number]    YOB: 1960  Age: 60 y.o. Sex: female    DOA: 8/30/2019 LOS:  LOS: 1 day                    Subjective:     No acute events overnight. Patient feels she is improving but slowly. Drinking and eating well. Denies dysuria. Endorses SOB     Review of Systems   Constitutional: Positive for malaise/fatigue. Negative for chills and fever. Respiratory: Positive for cough and sputum production. Cardiovascular: Negative for chest pain and palpitations. Gastrointestinal: Negative for abdominal pain, nausea and vomiting. Musculoskeletal: Negative for joint pain and myalgias. Skin: Negative for itching. Neurological: Negative for tingling and headaches. Endo/Heme/Allergies: Does not bruise/bleed easily. Psychiatric/Behavioral: Negative for depression and suicidal ideas.        Objective:      Patient Vitals for the past 24 hrs:   Temp Pulse Resp BP SpO2   09/01/19 0800 97.7 °F (36.5 °C) 80 18 150/83 98 %   09/01/19 0700 -- 67 18 123/60 100 %   09/01/19 0600 -- 69 16 107/47 96 %   09/01/19 0500 -- 74 20 105/46 95 %   09/01/19 0403 -- -- -- -- 98 %   09/01/19 0400 97 °F (36.1 °C) 73 19 135/75 97 %   09/01/19 0300 -- 73 18 123/67 97 %   09/01/19 0200 -- 79 20 131/55 94 %   09/01/19 0100 -- 87 22 134/68 97 %   09/01/19 0016 -- -- -- -- 98 %   09/01/19 0006 -- -- -- -- 97 %   09/01/19 0000 98.6 °F (37 °C) 80 26 131/59 100 %   08/31/19 2300 -- 89 26 141/74 97 %   08/31/19 2200 -- 93 24 119/66 98 %   08/31/19 2100 -- 91 24 132/69 96 %   08/31/19 2000 98 °F (36.7 °C) 98 24 137/63 99 %   08/31/19 1949 -- -- -- -- 98 %   08/31/19 1946 -- -- -- -- 100 %   08/31/19 1935 -- -- -- -- 98 %   08/31/19 1900 -- 87 22 132/57 97 %   08/31/19 1800 -- 85 18 136/59 97 %   08/31/19 1700 -- 87 24 143/59 97 %   08/31/19 1600 97.8 °F (36.6 °C) 86 21 (!) 130/114 96 %   08/31/19 1551 -- -- -- -- 97 % 08/31/19 1500 -- 85 22 119/47 95 %   08/31/19 1435 -- 84 21 -- 96 %   08/31/19 1400 -- 83 20 129/59 97 %   08/31/19 1300 -- 97 14 144/71 97 %   08/31/19 1200 98 °F (36.7 °C) 90 14 134/58 100 %   08/31/19 1145 -- -- -- -- 96 %   08/31/19 1100 -- 93 19 135/50 98 %   08/31/19 1028 -- -- -- -- 97 %   08/31/19 1000 -- 87 23 132/67 98 %   08/31/19 0900 -- 88 21 123/61 97 %         Intake/Output Summary (Last 24 hours) at 9/1/2019 0852  Last data filed at 9/1/2019 0600  Gross per 24 hour   Intake 900 ml   Output 1550 ml   Net -650 ml       Physical Exam   Constitutional: She is oriented to person, place, and time. She appears well-developed and well-nourished. She appears distressed (mild). HENT:   Head: Normocephalic and atraumatic. Eyes: Conjunctivae and EOM are normal. Right eye exhibits no discharge. Left eye exhibits no discharge. Neck: Normal range of motion. Neck supple. No tracheal deviation present. No thyromegaly present. Cardiovascular: Normal rate, regular rhythm and intact distal pulses. Pulmonary/Chest: No respiratory distress. She has wheezes. She has rales. She exhibits no tenderness. Abdominal: Soft. Bowel sounds are normal. She exhibits no distension. Musculoskeletal: Normal range of motion. She exhibits no edema or deformity. Neurological: She is alert and oriented to person, place, and time. No sensory deficit. She exhibits normal muscle tone. Skin: Skin is warm and dry. Capillary refill takes less than 2 seconds. She is not diaphoretic. No erythema. No pallor. Psychiatric: She has a normal mood and affect.  Her behavior is normal. Thought content normal.       Lab/Data Reviewed:  BMP:   Lab Results   Component Value Date/Time     09/01/2019 12:20 AM    K 4.1 09/01/2019 12:20 AM     09/01/2019 12:20 AM    CO2 25 09/01/2019 12:20 AM    AGAP 9 09/01/2019 12:20 AM     (H) 09/01/2019 12:20 AM    BUN 16 09/01/2019 12:20 AM    CREA 1.15 09/01/2019 12:20 AM    GFRAA 58 (L) 09/01/2019 12:20 AM    GFRNA 48 (L) 09/01/2019 12:20 AM     CMP:   Lab Results   Component Value Date/Time     09/01/2019 12:20 AM    K 4.1 09/01/2019 12:20 AM     09/01/2019 12:20 AM    CO2 25 09/01/2019 12:20 AM    AGAP 9 09/01/2019 12:20 AM     (H) 09/01/2019 12:20 AM    BUN 16 09/01/2019 12:20 AM    CREA 1.15 09/01/2019 12:20 AM    GFRAA 58 (L) 09/01/2019 12:20 AM    GFRNA 48 (L) 09/01/2019 12:20 AM    CA 9.0 09/01/2019 12:20 AM    MG 1.9 08/31/2019 09:37 AM     CBC:   Lab Results   Component Value Date/Time    WBC 10.8 09/01/2019 12:20 AM    HGB 12.0 09/01/2019 12:20 AM    HCT 36.4 09/01/2019 12:20 AM     09/01/2019 12:20 AM     All Cardiac Markers in the last 24 hours:   No results found for: CPK, CK, CKMMB, CKMB, RCK3, CKMBT, CKNDX, CKND1, KESHIA, TROPT, TROIQ, JOEL, TROPT, TNIPOC, BNP, BNPP  Recent Glucose Results:   Lab Results   Component Value Date/Time     (H) 09/01/2019 12:20 AM     (H) 08/31/2019 09:47 AM     ABG:   No results found for: PH, PHI, PCO2, PCO2I, PO2, PO2I, HCO3, HCO3I, FIO2, FIO2I  COAGS: No results found for: APTT, PTP, INR  Liver Panel:   No results found for: ALB, CBIL, TBIL, TP, GLOB, AGRAT, SGOT, ASTPOC, ALTPOC, ALT, GPT, AP  Pancreatic Markers: No results found for: AMYLPOCT, AML, LIPPOCT, LPSE     Scheduled Medications Reviewed:  Current Facility-Administered Medications   Medication Dose Route Frequency    aspirin delayed-release tablet 81 mg  81 mg Oral DAILY    budesonide-formoterol (SYMBICORT) 160-4.5 mcg/actuation HFA inhaler 2 Puff  2 Puff Inhalation BID RT    fluticasone propionate (FLONASE) 50 mcg/actuation nasal spray 2 Spray  2 Spray Both Nostrils DAILY    insulin glargine (LANTUS) injection 30 Units  30 Units SubCUTAneous QHS    [Held by provider] lisinopril (PRINIVIL, ZESTRIL) tablet 20 mg  20 mg Oral BID    montelukast (SINGULAIR) tablet 10 mg  10 mg Oral QHS    heparin (porcine) injection 5,000 Units  5,000 Units SubCUTAneous Q8H    albuterol-ipratropium (DUO-NEB) 2.5 MG-0.5 MG/3 ML  3 mL Nebulization Q4H RT    levoFLOXacin (LEVAQUIN) 750 mg in D5W IVPB  750 mg IntraVENous Q24H    methylPREDNISolone (PF) (Solu-MEDROL) injection 60 mg  60 mg IntraVENous Q12H    [Held by provider] hydroCHLOROthiazide (MICROZIDE) capsule 12.5 mg  12.5 mg Oral DAILY    pantoprazole (PROTONIX) tablet 40 mg  40 mg Oral ACB    insulin lispro (HUMALOG) injection   SubCUTAneous AC&HS       Imaging, microbiology, and EKG/Telemetry:  Results     Procedure Component Value Units Date/Time    CULTURE, RESPIRATORY/SPUTUM/BRONCH Arlet Praveena [[id number]]     Order Status:  Sent Specimen:  Sputum     CULTURE, URINE [[id number]] Collected:  08/30/19 2117    Order Status:  Completed Specimen:  Urine from Clean catch Updated:  08/31/19 0924    CULTURE, BLOOD [[id number]] Collected:  08/30/19 2018    Order Status:  Completed Specimen:  Blood Updated:  09/01/19 0615     Special Requests: NO SPECIAL REQUESTS        Culture result: NO GROWTH 2 DAYS       CULTURE, BLOOD [[id number]] Collected:  08/30/19 2005    Order Status:  Completed Specimen:  Blood Updated:  09/01/19 0615     Special Requests: NO SPECIAL REQUESTS        Culture result: NO GROWTH 2 DAYS               Assessment/Plan     60 y.o. female with PMH significant for COPD on home O2, IDDM2, HTN, HFpEF, SHERRIE on CPAP, GERD, allergic rhinitis now admitted with COPD exacerbation.     COPD exacerbation patient with severe COPD on 2L of O2 at home when active + trelegy at night and PRN as well as prednisone 10mg every other day. GOLD 2017 unknown grade but group D w/ >2 hospital admissions in 1yr. No recent PFT but expect significant reduction in FEV1. Event likely exacerbated by URI as patient endorsed purulent rhinorrhea, sinus tenderness, increased sputum and cough prior to admission. Lactic acid elevated with last value at 4. No leukocytosis or left shift.  Patient is also afebrile but patient states she never gets fever even when she is very ill. However considering the patient is improving clinically and taking PO adequately, will monitor for now. ABG on 8/31/19 post Bipap usage was pH 7.36, pCO2 42, pO2 106, HCO3 234, sO2 98%  - Fu blood cx, respiratory cx  - FU repeat lactic acid   - Levaquin 750mg QD - reviewed available data on up to date at higher risk for pseudomonas infection/colonization given recent hospitalization and abx use  - Scheduled duonebs Q4H  - Solumedrol 60mg BID for now, suggest a slow taper.   - Home trelegy PRN and QHS   - Goal SpO2 should be between 88-92% in this patient with severe COPD  - Continue home singular  - Hold home spiriva while on scheduled duoneb treatments  - Sub home advair per pharmacy recs: c/w symbicort  - Daily CBC, BMP   - PT/OT/CM  - Consider pulm consult if no improvement   - Incentive spirometery in place, pt educated on how to use device,   - acapella vibratory PEP     Bacteriuria vs UTI patient with no frequency, dysuria but 3+ bacteria noted on UA. No nitrites or LE.   - Will culture. ABX as above for COPD exacerbation. Will not treat in setting of asymptomatic bacteriuria      IDDM2 moderately controleld with last A1C 8.1 7/2019. Taking lantus 30 units QHS, does note that her blood sugars elevate significantly with steroid therapy.  Currently BG is uncontrolled with ranges in 200-300s  - SSI QACHS  - consider increasing lantus dose or increasing frequency (and dose) to BID while on high dose of steroids  - Diabetic diet   - Will monitor BS closely     HTN   - holding home lisinopril 20mg BID due to lower BP  - holding home HCTZ 12.5 QD     HFpEF? last echo with EF 66-70% 7/2018, noted prior history of diastolic dysfunction.  - Continue home Aspirin for now  - Monitor for fluid overload  - I&O  - Daily weights      GERD well controlled  - Will give IV protonix for now with high dose steroid and increase risk for bleeding      Allergic rhinitis  - Continue home flonase  - Hold home cetirizine      Diet: cardiac  DVT Prophylaxis: heparin  Code Status: full  Point of Contact: Andrez Watters (daughter) [phone]     Disposition and anticipated LOS: >2mnt    Juan Yu  PGY-3 [facility]  09/01/19 6:42 AM

## 2019-09-01 NOTE — PROGRESS NOTES
Problem: Patient Education: Go to Patient Education Activity  Goal: Patient/Family Education  Outcome: Progressing Towards Goal     Problem: Pain  Goal: *Control of Pain  Outcome: Progressing Towards Goal     Problem: Patient Education: Go to Patient Education Activity  Goal: Patient/Family Education  Outcome: Progressing Towards Goal

## 2019-09-02 LAB
ANION GAP SERPL CALC-SCNC: 11 MMOL/L (ref 3–18)
BASOPHILS # BLD: 0 K/UL (ref 0–0.1)
BASOPHILS NFR BLD: 0 % (ref 0–2)
BUN SERPL-MCNC: 19 MG/DL (ref 7–18)
BUN/CREAT SERPL: 17 (ref 12–20)
CALCIUM SERPL-MCNC: 9.2 MG/DL (ref 8.5–10.1)
CHLORIDE SERPL-SCNC: 99 MMOL/L (ref 100–111)
CO2 SERPL-SCNC: 26 MMOL/L (ref 21–32)
CREAT SERPL-MCNC: 1.13 MG/DL (ref 0.6–1.3)
DIFFERENTIAL METHOD BLD: ABNORMAL
EOSINOPHIL # BLD: 0 K/UL (ref 0–0.4)
EOSINOPHIL NFR BLD: 0 % (ref 0–5)
ERYTHROCYTE [DISTWIDTH] IN BLOOD BY AUTOMATED COUNT: 13.5 % (ref 11.6–14.5)
GLUCOSE BLD STRIP.AUTO-MCNC: 283 MG/DL (ref 70–110)
GLUCOSE BLD STRIP.AUTO-MCNC: 288 MG/DL (ref 70–110)
GLUCOSE BLD STRIP.AUTO-MCNC: 313 MG/DL (ref 70–110)
GLUCOSE BLD STRIP.AUTO-MCNC: 313 MG/DL (ref 70–110)
GLUCOSE SERPL-MCNC: 321 MG/DL (ref 74–99)
HCT VFR BLD AUTO: 35.6 % (ref 35–45)
HGB BLD-MCNC: 11.7 G/DL (ref 12–16)
LACTATE SERPL-SCNC: 2.8 MMOL/L (ref 0.4–2)
LACTATE SERPL-SCNC: 3.2 MMOL/L (ref 0.4–2)
LACTATE SERPL-SCNC: 3.7 MMOL/L (ref 0.4–2)
LACTATE SERPL-SCNC: 4.4 MMOL/L (ref 0.4–2)
LYMPHOCYTES # BLD: 0.8 K/UL (ref 0.9–3.6)
LYMPHOCYTES NFR BLD: 6 % (ref 21–52)
MCH RBC QN AUTO: 29.6 PG (ref 24–34)
MCHC RBC AUTO-ENTMCNC: 32.9 G/DL (ref 31–37)
MCV RBC AUTO: 90.1 FL (ref 74–97)
MONOCYTES # BLD: 0.5 K/UL (ref 0.05–1.2)
MONOCYTES NFR BLD: 4 % (ref 3–10)
NEUTS SEG # BLD: 11 K/UL (ref 1.8–8)
NEUTS SEG NFR BLD: 90 % (ref 40–73)
PLATELET # BLD AUTO: 300 K/UL (ref 135–420)
PMV BLD AUTO: 9.3 FL (ref 9.2–11.8)
POTASSIUM SERPL-SCNC: 4 MMOL/L (ref 3.5–5.5)
RBC # BLD AUTO: 3.95 M/UL (ref 4.2–5.3)
SODIUM SERPL-SCNC: 136 MMOL/L (ref 136–145)
WBC # BLD AUTO: 12.3 K/UL (ref 4.6–13.2)

## 2019-09-02 PROCEDURE — 74011636637 HC RX REV CODE- 636/637: Performed by: FAMILY MEDICINE

## 2019-09-02 PROCEDURE — 74011250637 HC RX REV CODE- 250/637: Performed by: STUDENT IN AN ORGANIZED HEALTH CARE EDUCATION/TRAINING PROGRAM

## 2019-09-02 PROCEDURE — 36415 COLL VENOUS BLD VENIPUNCTURE: CPT

## 2019-09-02 PROCEDURE — 94640 AIRWAY INHALATION TREATMENT: CPT

## 2019-09-02 PROCEDURE — 74011000250 HC RX REV CODE- 250: Performed by: STUDENT IN AN ORGANIZED HEALTH CARE EDUCATION/TRAINING PROGRAM

## 2019-09-02 PROCEDURE — 65660000004 HC RM CVT STEPDOWN

## 2019-09-02 PROCEDURE — 82962 GLUCOSE BLOOD TEST: CPT

## 2019-09-02 PROCEDURE — 74011636637 HC RX REV CODE- 636/637: Performed by: STUDENT IN AN ORGANIZED HEALTH CARE EDUCATION/TRAINING PROGRAM

## 2019-09-02 PROCEDURE — 83605 ASSAY OF LACTIC ACID: CPT

## 2019-09-02 PROCEDURE — 77010033678 HC OXYGEN DAILY

## 2019-09-02 PROCEDURE — 94762 N-INVAS EAR/PLS OXIMTRY CONT: CPT

## 2019-09-02 PROCEDURE — 85025 COMPLETE CBC W/AUTO DIFF WBC: CPT

## 2019-09-02 PROCEDURE — 80048 BASIC METABOLIC PNL TOTAL CA: CPT

## 2019-09-02 PROCEDURE — 74011250636 HC RX REV CODE- 250/636: Performed by: STUDENT IN AN ORGANIZED HEALTH CARE EDUCATION/TRAINING PROGRAM

## 2019-09-02 RX ORDER — PREDNISONE 20 MG/1
20 TABLET ORAL
Status: DISCONTINUED | OUTPATIENT
Start: 2019-09-09 | End: 2019-09-05 | Stop reason: HOSPADM

## 2019-09-02 RX ORDER — PREDNISONE 20 MG/1
20 TABLET ORAL
Status: DISCONTINUED | OUTPATIENT
Start: 2019-09-10 | End: 2019-09-02

## 2019-09-02 RX ORDER — PREDNISONE 20 MG/1
40 TABLET ORAL
Status: DISCONTINUED | OUTPATIENT
Start: 2019-09-05 | End: 2019-09-05 | Stop reason: HOSPADM

## 2019-09-02 RX ADMIN — HEPARIN SODIUM 5000 UNITS: 5000 INJECTION INTRAVENOUS; SUBCUTANEOUS at 01:01

## 2019-09-02 RX ADMIN — ASPIRIN 81 MG: 81 TABLET, COATED ORAL at 08:11

## 2019-09-02 RX ADMIN — FLUTICASONE PROPIONATE 2 SPRAY: 50 SPRAY, METERED NASAL at 09:00

## 2019-09-02 RX ADMIN — BUDESONIDE AND FORMOTEROL FUMARATE DIHYDRATE 2 PUFF: 160; 4.5 AEROSOL RESPIRATORY (INHALATION) at 20:03

## 2019-09-02 RX ADMIN — PREDNISONE 60 MG: 20 TABLET ORAL at 08:11

## 2019-09-02 RX ADMIN — IPRATROPIUM BROMIDE AND ALBUTEROL SULFATE 3 ML: .5; 3 SOLUTION RESPIRATORY (INHALATION) at 12:10

## 2019-09-02 RX ADMIN — INSULIN LISPRO 12 UNITS: 100 INJECTION, SOLUTION INTRAVENOUS; SUBCUTANEOUS at 17:29

## 2019-09-02 RX ADMIN — INSULIN GLARGINE 35 UNITS: 100 INJECTION, SOLUTION SUBCUTANEOUS at 21:54

## 2019-09-02 RX ADMIN — IPRATROPIUM BROMIDE AND ALBUTEROL SULFATE 3 ML: .5; 3 SOLUTION RESPIRATORY (INHALATION) at 07:26

## 2019-09-02 RX ADMIN — HEPARIN SODIUM 5000 UNITS: 5000 INJECTION INTRAVENOUS; SUBCUTANEOUS at 10:03

## 2019-09-02 RX ADMIN — PANTOPRAZOLE SODIUM 40 MG: 40 TABLET, DELAYED RELEASE ORAL at 08:11

## 2019-09-02 RX ADMIN — LEVOFLOXACIN 750 MG: 5 INJECTION, SOLUTION INTRAVENOUS at 00:58

## 2019-09-02 RX ADMIN — IPRATROPIUM BROMIDE AND ALBUTEROL SULFATE 3 ML: .5; 3 SOLUTION RESPIRATORY (INHALATION) at 20:03

## 2019-09-02 RX ADMIN — IPRATROPIUM BROMIDE AND ALBUTEROL SULFATE 3 ML: .5; 3 SOLUTION RESPIRATORY (INHALATION) at 04:30

## 2019-09-02 RX ADMIN — HEPARIN SODIUM 5000 UNITS: 5000 INJECTION INTRAVENOUS; SUBCUTANEOUS at 17:29

## 2019-09-02 RX ADMIN — BUDESONIDE AND FORMOTEROL FUMARATE DIHYDRATE 2 PUFF: 160; 4.5 AEROSOL RESPIRATORY (INHALATION) at 07:26

## 2019-09-02 RX ADMIN — INSULIN LISPRO 9 UNITS: 100 INJECTION, SOLUTION INTRAVENOUS; SUBCUTANEOUS at 08:11

## 2019-09-02 RX ADMIN — INSULIN LISPRO 9 UNITS: 100 INJECTION, SOLUTION INTRAVENOUS; SUBCUTANEOUS at 21:54

## 2019-09-02 RX ADMIN — MONTELUKAST 10 MG: 10 TABLET, FILM COATED ORAL at 21:44

## 2019-09-02 RX ADMIN — INSULIN LISPRO 12 UNITS: 100 INJECTION, SOLUTION INTRAVENOUS; SUBCUTANEOUS at 12:10

## 2019-09-02 RX ADMIN — IPRATROPIUM BROMIDE AND ALBUTEROL SULFATE 3 ML: .5; 3 SOLUTION RESPIRATORY (INHALATION) at 16:42
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
## 2019-09-02 NOTE — PROGRESS NOTES
Bedside shift change report given to Branden Pablo RN (oncoming nurse) by Beatrice Cardona RN (offgoing nurse).  Report included the following information SBAR, Kardex, MAR, Recent Results and Cardiac Rhythm SR.

## 2019-09-02 NOTE — PROGRESS NOTES
Progress Note  [facility]       Patient: William Washington MRN: [id number]  CSN: [id number]    YOB: 1960  Age: 60 y.o. Sex: female    DOA: 8/30/2019 LOS:  LOS: 2 days                    Subjective:     No acute events overnight. Patient feels she is improving but slowly. Drinking and eating well. Endorses SOB, which is improving  States she is coughing up some green sputum  Is using the acapella hourly     Review of Systems   Constitutional: Positive for malaise/fatigue. Negative for chills and fever. Respiratory: Positive for cough and sputum production. Cardiovascular: Negative for chest pain and palpitations. Gastrointestinal: Negative for abdominal pain, nausea and vomiting. Musculoskeletal: Negative for joint pain and myalgias. Skin: Negative for itching. Neurological: Negative for tingling and headaches. Endo/Heme/Allergies: Does not bruise/bleed easily. Psychiatric/Behavioral: Negative for depression and suicidal ideas.        Objective:      Patient Vitals for the past 24 hrs:   Temp Pulse Resp BP SpO2   09/02/19 0430 -- -- -- -- 97 %   09/02/19 0350 97.2 °F (36.2 °C) 83 21 148/77 91 %   09/01/19 2351 98.2 °F (36.8 °C) 74 18 149/72 96 %   09/01/19 2348 -- -- -- -- 94 %   09/01/19 2340 -- -- -- -- 95 %   09/01/19 2025 -- -- -- -- 94 %   09/01/19 2020 -- -- -- -- 98 %   09/01/19 2000 98.7 °F (37.1 °C) 86 27 150/87 97 %   09/01/19 1817 98.3 °F (36.8 °C) 94 22 142/72 96 %   09/01/19 1600 97.9 °F (36.6 °C) 89 27 (!) 123/92 96 %   09/01/19 1553 -- -- -- -- 98 %   09/01/19 1500 -- 95 23 142/57 98 %   09/01/19 1444 -- -- -- -- 98 %   09/01/19 1400 -- 86 24 156/68 97 %   09/01/19 1300 -- 87 26 145/66 97 %   09/01/19 1200 97.8 °F (36.6 °C) 98 22 166/62 98 %   09/01/19 1100 -- 87 21 127/54 97 %   09/01/19 1000 -- 90 23 145/63 97 %   09/01/19 0840 -- -- -- -- 99 %   09/01/19 0800 97.7 °F (36.5 °C) 80 18 150/83 98 %   09/01/19 0700 -- 67 18 123/60 100 % Intake/Output Summary (Last 24 hours) at 9/2/2019 0603  Last data filed at 9/2/2019 0430  Gross per 24 hour   Intake 400 ml   Output 1600 ml   Net -1200 ml       Physical Exam   Constitutional: She is oriented to person, place, and time. She appears well-developed and well-nourished. No distress (mild). HENT:   Head: Normocephalic and atraumatic. Eyes: Conjunctivae and EOM are normal. Right eye exhibits no discharge. Left eye exhibits no discharge. Neck: Normal range of motion. Neck supple. No tracheal deviation present. No thyromegaly present. Cardiovascular: Normal rate, regular rhythm and intact distal pulses. Pulmonary/Chest: No respiratory distress. She has wheezes. She has rales. She exhibits no tenderness. Abdominal: Soft. Bowel sounds are normal. She exhibits no distension. Musculoskeletal: Normal range of motion. She exhibits edema (minor trace edema LLE). She exhibits no deformity. Neurological: She is alert and oriented to person, place, and time. No sensory deficit. She exhibits normal muscle tone. Skin: Skin is warm and dry. Capillary refill takes less than 2 seconds. She is not diaphoretic. No erythema. No pallor. Psychiatric: She has a normal mood and affect.  Her behavior is normal. Thought content normal.       Lab/Data Reviewed:  BMP:   Lab Results   Component Value Date/Time     09/02/2019 01:06 AM    K 4.0 09/02/2019 01:06 AM    CL 99 (L) 09/02/2019 01:06 AM    CO2 26 09/02/2019 01:06 AM    AGAP 11 09/02/2019 01:06 AM     (H) 09/02/2019 01:06 AM    BUN 19 (H) 09/02/2019 01:06 AM    CREA 1.13 09/02/2019 01:06 AM    GFRAA 60 (L) 09/02/2019 01:06 AM    GFRNA 49 (L) 09/02/2019 01:06 AM     CMP:   Lab Results   Component Value Date/Time     09/02/2019 01:06 AM    K 4.0 09/02/2019 01:06 AM    CL 99 (L) 09/02/2019 01:06 AM    CO2 26 09/02/2019 01:06 AM    AGAP 11 09/02/2019 01:06 AM     (H) 09/02/2019 01:06 AM    BUN 19 (H) 09/02/2019 01:06 AM    CREA 1.13 09/02/2019 01:06 AM    GFRAA 60 (L) 09/02/2019 01:06 AM    GFRNA 49 (L) 09/02/2019 01:06 AM    CA 9.2 09/02/2019 01:06 AM     CBC:   Lab Results   Component Value Date/Time    WBC 12.3 09/02/2019 01:06 AM    HGB 11.7 (L) 09/02/2019 01:06 AM    HCT 35.6 09/02/2019 01:06 AM     09/02/2019 01:06 AM     All Cardiac Markers in the last 24 hours:   Lab Results   Component Value Date/Time    CPK 95 09/01/2019 10:38 AM    CKMB 1.2 09/01/2019 10:38 AM    CKND1 1.3 09/01/2019 10:38 AM    TROIQ <0.02 09/01/2019 10:38 AM     Recent Glucose Results:   Lab Results   Component Value Date/Time     (H) 09/02/2019 01:06 AM     ABG:   Lab Results   Component Value Date/Time    PHI 7.381 09/01/2019 12:49 PM    PCO2I 43.7 09/01/2019 12:49 PM    PO2I 97 09/01/2019 12:49 PM    HCO3I 26.0 09/01/2019 12:49 PM    FIO2I 28 09/01/2019 12:49 PM     COAGS: No results found for: APTT, PTP, INR  Liver Panel:   No results found for: ALB, CBIL, TBIL, TP, GLOB, AGRAT, SGOT, ASTPOC, ALTPOC, ALT, GPT, AP  Pancreatic Markers: No results found for: AMYLPOCT, AML, LIPPOCT, LPSE     Scheduled Medications Reviewed:  Current Facility-Administered Medications   Medication Dose Route Frequency    predniSONE (DELTASONE) tablet 60 mg  60 mg Oral DAILY WITH BREAKFAST    insulin glargine (LANTUS) injection 35 Units  35 Units SubCUTAneous QHS    aspirin delayed-release tablet 81 mg  81 mg Oral DAILY    budesonide-formoterol (SYMBICORT) 160-4.5 mcg/actuation HFA inhaler 2 Puff  2 Puff Inhalation BID RT    fluticasone propionate (FLONASE) 50 mcg/actuation nasal spray 2 Spray  2 Spray Both Nostrils DAILY    [Held by provider] lisinopril (PRINIVIL, ZESTRIL) tablet 20 mg  20 mg Oral BID    montelukast (SINGULAIR) tablet 10 mg  10 mg Oral QHS    heparin (porcine) injection 5,000 Units  5,000 Units SubCUTAneous Q8H    albuterol-ipratropium (DUO-NEB) 2.5 MG-0.5 MG/3 ML  3 mL Nebulization Q4H RT    levoFLOXacin (LEVAQUIN) 750 mg in D5W IVPB  750 mg IntraVENous Q24H    [Held by provider] hydroCHLOROthiazide (MICROZIDE) capsule 12.5 mg  12.5 mg Oral DAILY    pantoprazole (PROTONIX) tablet 40 mg  40 mg Oral ACB    insulin lispro (HUMALOG) injection   SubCUTAneous AC&HS       Imaging, microbiology, and EKG/Telemetry:  Results     Procedure Component Value Units Date/Time    CULTURE, RESPIRATORY/SPUTUM/BRONCH Ian Sands [[id number]] Collected:  08/31/19 2020    Order Status:  Completed Specimen:  Sputum Updated:  09/01/19 1049     Special Requests: NO SPECIAL REQUESTS        GRAM STAIN >25 WBC/lpf         <10 EPI/lpf               RARE GRAM POSITIVE COCCI IN PAIRS            MUCUS PRESENT        Culture result: PENDING    CULTURE, URINE [[id number]] Collected:  08/30/19 2117    Order Status:  Completed Specimen:  Urine from Clean catch Updated:  09/01/19 1052     Special Requests: NO SPECIAL REQUESTS        Culture result:       [id number]  COLONIES/mL  MIXED GRAM POSITIVE TOAN, PROBABLE SKIN/GENITAL CONTAMINATION. CULTURE, BLOOD [[id number]] Collected:  08/30/19 2018    Order Status:  Completed Specimen:  Blood Updated:  09/01/19 0615     Special Requests: NO SPECIAL REQUESTS        Culture result: NO GROWTH 2 DAYS       CULTURE, BLOOD [[id number]] Collected:  08/30/19 2005    Order Status:  Completed Specimen:  Blood Updated:  09/01/19 0615     Special Requests: NO SPECIAL REQUESTS        Culture result: NO GROWTH 2 DAYS               Assessment/Plan     60 y.o. female with PMH significant for COPD on home O2, IDDM2, HTN, HFpEF, SHERRIE on CPAP, GERD, allergic rhinitis now admitted with COPD exacerbation.     COPD exacerbation patient with severe COPD on 2L of O2 at home when active + trelegy at night and PRN as well as prednisone 10mg every other day. GOLD 2017 unknown grade but group D w/ >2 hospital admissions in 1yr. No recent PFT but expect significant reduction in FEV1.    Event likely exacerbated by URI as patient endorsed purulent rhinorrhea, sinus tenderness, increased sputum and cough prior to admission. Lactic acid elevated with last value at 4. No leukocytosis or left shift. Patient is also afebrile but patient states she never gets fever even when she is very ill. However considering the patient is improving clinically and taking PO adequately, will monitor for now. ABG on 19 post Bipap usage was pH 7.36, pCO2 42, pO2 106, HCO3 234, sO2 98%  Levaquin 750mg QD - reviewed available data on up to date at higher risk for pseudomonas infection/colonization given recent hospitalization and abx use  Goal SpO2 should be between 88-92% in this patient with severe COPD  - Hold home spiriva while on scheduled duoneb treatments  - Sub home advair per pharmacy recs: c/w symbicort  - PT/OT/CM  - Incentive spirometry and acapella in place, pt educated on how to use device   Lactic acid 3.2   BC no growth at 2 days    Plan   Fu  respiratory cx  FU repeat lactic acid   Scheduled duonebs Q4H  Prednisone 60mg BID for now, suggest a slow taper. Prednisone 50mg BID 9/3-,  Prednisone 40mg BID -,  Prednisone 30mg BID -,  Prednisone 20mg BID -9/10, 19 Prednisone 10mg which was her home regiment prior to admit  Home trelegy PRN and QHS   Continue home singular  Daily CBC WNL Hgb 11.7, BMP WNL   AB.831/43.7/97/     Bacteriuria vs UTI patient with no frequency, dysuria but 3+ bacteria noted on UA. No nitrites or LE. ABX as above for COPD exacerbation. Will not treat in setting of asymptomatic bacteriuria    UC results: [id number]  COLONIES/mL  MIXED GRAM POSITIVE TOAN, PROBABLE SKIN/GENITAL CONTAMINATION   BC no growth at 2 days  Plan   monitor for symptoms    IDDM2 moderately controleld with last A1C 8.1 2019. Taking lantus 30 units QHS, does note that her blood sugars elevate significantly with steroid therapy.  Currently BG is uncontrolled with ranges in 200-300s  Last 24hr range 244-347  Plan  - SSI QACHS  - consider increasing lantus dose or increasing frequency (and dose) to BID while on high dose of steroids  - Diabetic diet   - Will monitor BS closely     HTN   24 hr range 123-16/60-62  Plan  - holding home lisinopril 20mg BID due to lower BP  - holding home HCTZ 12.5 every day       HFpEF? last echo with EF 66-70% 7/2018, noted prior history of diastolic dysfunction.  - Continue home Aspirin for now  Plan  - Monitor for fluid overload  - I&O  - Daily weights      GERD well controlled  - Will give IV protonix for now with high dose steroid and increase risk for bleeding      Allergic rhinitis  - Continue home flonase  - Hold home cetirizine      Diet: cardiac  DVT Prophylaxis: heparin  Code Status: full  Point of Contact: Jerald Cesar (daughter) [phone]     Disposition and anticipated LOS: >2mnt    López Page DO  [facility] Resident  [facility]  09/02/19 6:42 AM

## 2019-09-02 NOTE — DIABETES MGMT
NUTRITIONAL ASSESSMENT GLYCEMIC CONTROL/ PLAN OF CARE     John Gunter           60 y.o.           8/30/2019                 1. Acute exacerbation of chronic obstructive pulmonary disease (COPD) ([address]    2. Respiratory distress       INTERVENTIONS/PLAN:   Continue to adjust Lantus insulin as needed to reach glucose targets. ASSESSMENT:   Pt is a 60year old female with a past medical history significant for COPD, type 2 diabetes, hypertension, SHERRIE, and GERD, who is admitted with COPD exacerbation. Blood glucose elevated above targets, likely related to steroids. Lantus insulin increased to 35 units. Pt is tolerating diet with good intake.      Diabetes Management:   Recent blood glucose:    9/1/2019 11:00 9/1/2019 15:37 9/1/2019 21:23 9/2/2019 07:31   297 (H) 365 (H) 282 (H) 288 (H)    Within target range (non-ICU: <140; ICU<180): [] Yes   [x]  No    Current Insulin regimen:   Lantus insulin 35 units every bedtime   Correctional Lispro insulin 4 times daily ACHS   Home medication/insulin regimen:   Lantus insulin 30 units nightly   Novolog insulin per sliding scale   HbA1c: 8.1% (estimated average glucose of 186 mg/dL)  Adequate glycemic control PTA:  [] Yes  [x] No     SUBJECTIVE/OBJECTIVE:     Diet: Diabetic consistent carbohydrate, no concentrated sweets     Patient Vitals for the past 100 hrs:   % Diet Eaten   09/02/19 0858 100 %   09/01/19 1200 100 %   09/01/19 0800 100 %   08/31/19 1951 0 %   08/31/19 1650 100 %   08/31/19 1245 60 %     Medications: [x]  Reviewed     Most Recent POC Glucose:   Recent Labs     09/02/19  0106 09/01/19  0020 08/31/19  0947 08/30/19  2005   * 347* 259* 157*      Labs:   Lab Results   Component Value Date/Time    Hemoglobin A1c 8.1 (H) 06/30/2019 03:13 AM     Lab Results   Component Value Date/Time    Sodium 136 09/02/2019 01:06 AM    Potassium 4.0 09/02/2019 01:06 AM    Chloride 99 (L) 09/02/2019 01:06 AM    CO2 26 09/02/2019 01:06 AM    Anion gap 11 2019 01:06 AM    Glucose 321 (H) 2019 01:06 AM    BUN 19 (H) 2019 01:06 AM    Creatinine 1.13 2019 01:06 AM    Calcium 9.2 2019 01:06 AM    Magnesium 1.9 2019 09:37 AM    Phosphorus 3.1 2019 06:51 AM    Albumin 3.9 2019 08:05 PM     Anthropometrics: BMI (calculated): 43.8  Wt Readings from Last 1 Encounters:   19 112 kg (247 lb)      Ht Readings from Last 1 Encounters:   19 5' 3\" (1.6 m)     Estimated Nutrition Needs: 0472-8338 Kcal/day, 54-67 grams protein/day   Based on:   [] Actual BW    [] IBW   [x] Adjusted BW  (67 kg)    Nutrition Diagnoses:    Altered nutrition related lab value related to diabetes as evidenced by Hemoglobin A1c of 8.1%  Nutrition Interventions: coordination of care, diabetic diet   Goal: Blood glucose will be within target range of  mg/dL by 19     Nutrition Monitoring and Evaluation      []     Monitor po intake on meal rounds  [x]     Continue inpatient monitoring and intervention  []     Other:    Veverly Apgar, [address] [place] [address], [address] [place] [address]

## 2019-09-03 LAB
ANION GAP SERPL CALC-SCNC: 6 MMOL/L (ref 3–18)
BACTERIA SPEC CULT: NORMAL
BASOPHILS # BLD: 0 K/UL (ref 0–0.1)
BASOPHILS NFR BLD: 0 % (ref 0–2)
BUN SERPL-MCNC: 21 MG/DL (ref 7–18)
BUN/CREAT SERPL: 23 (ref 12–20)
CALCIUM SERPL-MCNC: 9.4 MG/DL (ref 8.5–10.1)
CHLORIDE SERPL-SCNC: 101 MMOL/L (ref 100–111)
CO2 SERPL-SCNC: 30 MMOL/L (ref 21–32)
CREAT SERPL-MCNC: 0.93 MG/DL (ref 0.6–1.3)
DIFFERENTIAL METHOD BLD: ABNORMAL
EOSINOPHIL # BLD: 0 K/UL (ref 0–0.4)
EOSINOPHIL NFR BLD: 0 % (ref 0–5)
ERYTHROCYTE [DISTWIDTH] IN BLOOD BY AUTOMATED COUNT: 13.2 % (ref 11.6–14.5)
GLUCOSE BLD STRIP.AUTO-MCNC: 137 MG/DL (ref 70–110)
GLUCOSE BLD STRIP.AUTO-MCNC: 219 MG/DL (ref 70–110)
GLUCOSE BLD STRIP.AUTO-MCNC: 270 MG/DL (ref 70–110)
GLUCOSE BLD STRIP.AUTO-MCNC: 313 MG/DL (ref 70–110)
GLUCOSE SERPL-MCNC: 154 MG/DL (ref 74–99)
GRAM STN SPEC: NORMAL
HCT VFR BLD AUTO: 35 % (ref 35–45)
HGB BLD-MCNC: 11.8 G/DL (ref 12–16)
LACTATE SERPL-SCNC: 2 MMOL/L (ref 0.4–2)
LACTATE SERPL-SCNC: 2.1 MMOL/L (ref 0.4–2)
LACTATE SERPL-SCNC: 2.6 MMOL/L (ref 0.4–2)
LACTATE SERPL-SCNC: 3.1 MMOL/L (ref 0.4–2)
LACTATE SERPL-SCNC: 3.1 MMOL/L (ref 0.4–2)
LYMPHOCYTES # BLD: 2.6 K/UL (ref 0.9–3.6)
LYMPHOCYTES NFR BLD: 29 % (ref 21–52)
MCH RBC QN AUTO: 29.6 PG (ref 24–34)
MCHC RBC AUTO-ENTMCNC: 33.7 G/DL (ref 31–37)
MCV RBC AUTO: 87.9 FL (ref 74–97)
MONOCYTES # BLD: 0.7 K/UL (ref 0.05–1.2)
MONOCYTES NFR BLD: 8 % (ref 3–10)
NEUTS SEG # BLD: 5.7 K/UL (ref 1.8–8)
NEUTS SEG NFR BLD: 63 % (ref 40–73)
PLATELET # BLD AUTO: 286 K/UL (ref 135–420)
PMV BLD AUTO: 9.1 FL (ref 9.2–11.8)
POTASSIUM SERPL-SCNC: 3.5 MMOL/L (ref 3.5–5.5)
RBC # BLD AUTO: 3.98 M/UL (ref 4.2–5.3)
SERVICE CMNT-IMP: NORMAL
SODIUM SERPL-SCNC: 137 MMOL/L (ref 136–145)
WBC # BLD AUTO: 8.9 K/UL (ref 4.6–13.2)

## 2019-09-03 PROCEDURE — 94640 AIRWAY INHALATION TREATMENT: CPT

## 2019-09-03 PROCEDURE — 94762 N-INVAS EAR/PLS OXIMTRY CONT: CPT

## 2019-09-03 PROCEDURE — 85025 COMPLETE CBC W/AUTO DIFF WBC: CPT

## 2019-09-03 PROCEDURE — 65270000029 HC RM PRIVATE

## 2019-09-03 PROCEDURE — 80048 BASIC METABOLIC PNL TOTAL CA: CPT

## 2019-09-03 PROCEDURE — 74011250637 HC RX REV CODE- 250/637: Performed by: STUDENT IN AN ORGANIZED HEALTH CARE EDUCATION/TRAINING PROGRAM

## 2019-09-03 PROCEDURE — 77010033678 HC OXYGEN DAILY: Performed by: FAMILY MEDICINE

## 2019-09-03 PROCEDURE — 74011000258 HC RX REV CODE- 258: Performed by: STUDENT IN AN ORGANIZED HEALTH CARE EDUCATION/TRAINING PROGRAM

## 2019-09-03 PROCEDURE — 74011250636 HC RX REV CODE- 250/636: Performed by: STUDENT IN AN ORGANIZED HEALTH CARE EDUCATION/TRAINING PROGRAM

## 2019-09-03 PROCEDURE — 74011636637 HC RX REV CODE- 636/637: Performed by: STUDENT IN AN ORGANIZED HEALTH CARE EDUCATION/TRAINING PROGRAM

## 2019-09-03 PROCEDURE — 83605 ASSAY OF LACTIC ACID: CPT

## 2019-09-03 PROCEDURE — 36415 COLL VENOUS BLD VENIPUNCTURE: CPT

## 2019-09-03 PROCEDURE — 74011636637 HC RX REV CODE- 636/637: Performed by: FAMILY MEDICINE

## 2019-09-03 PROCEDURE — 82962 GLUCOSE BLOOD TEST: CPT

## 2019-09-03 PROCEDURE — 74011000250 HC RX REV CODE- 250: Performed by: STUDENT IN AN ORGANIZED HEALTH CARE EDUCATION/TRAINING PROGRAM

## 2019-09-03 RX ORDER — INSULIN GLARGINE 100 [IU]/ML
38 INJECTION, SOLUTION SUBCUTANEOUS
Status: DISCONTINUED | OUTPATIENT
Start: 2019-09-03 | End: 2019-09-05 | Stop reason: HOSPADM

## 2019-09-03 RX ORDER — POLYETHYLENE GLYCOL 3350 17 G/17G
17 POWDER, FOR SOLUTION ORAL DAILY
Status: DISCONTINUED | OUTPATIENT
Start: 2019-09-03 | End: 2019-09-05 | Stop reason: HOSPADM

## 2019-09-03 RX ORDER — GUAIFENESIN 600 MG/1
600 TABLET, EXTENDED RELEASE ORAL EVERY 12 HOURS
Status: DISCONTINUED | OUTPATIENT
Start: 2019-09-03 | End: 2019-09-05 | Stop reason: HOSPADM

## 2019-09-03 RX ADMIN — HEPARIN SODIUM 5000 UNITS: 5000 INJECTION INTRAVENOUS; SUBCUTANEOUS at 10:15

## 2019-09-03 RX ADMIN — GUAIFENESIN 600 MG: 600 TABLET, EXTENDED RELEASE ORAL at 21:42

## 2019-09-03 RX ADMIN — POLYETHYLENE GLYCOL 3350 17 G: 17 POWDER, FOR SOLUTION ORAL at 10:12

## 2019-09-03 RX ADMIN — INSULIN LISPRO 12 UNITS: 100 INJECTION, SOLUTION INTRAVENOUS; SUBCUTANEOUS at 16:56

## 2019-09-03 RX ADMIN — IPRATROPIUM BROMIDE AND ALBUTEROL SULFATE 3 ML: .5; 3 SOLUTION RESPIRATORY (INHALATION) at 12:16

## 2019-09-03 RX ADMIN — LEVOFLOXACIN 750 MG: 5 INJECTION, SOLUTION INTRAVENOUS at 01:07

## 2019-09-03 RX ADMIN — PANTOPRAZOLE SODIUM 40 MG: 40 TABLET, DELAYED RELEASE ORAL at 09:13

## 2019-09-03 RX ADMIN — GUAIFENESIN 600 MG: 600 TABLET, EXTENDED RELEASE ORAL at 15:28

## 2019-09-03 RX ADMIN — HEPARIN SODIUM 5000 UNITS: 5000 INJECTION INTRAVENOUS; SUBCUTANEOUS at 17:30

## 2019-09-03 RX ADMIN — IPRATROPIUM BROMIDE AND ALBUTEROL SULFATE 3 ML: .5; 3 SOLUTION RESPIRATORY (INHALATION) at 20:35

## 2019-09-03 RX ADMIN — IPRATROPIUM BROMIDE AND ALBUTEROL SULFATE 3 ML: .5; 3 SOLUTION RESPIRATORY (INHALATION) at 16:22

## 2019-09-03 RX ADMIN — BUDESONIDE AND FORMOTEROL FUMARATE DIHYDRATE 2 PUFF: 160; 4.5 AEROSOL RESPIRATORY (INHALATION) at 20:35

## 2019-09-03 RX ADMIN — MONTELUKAST 10 MG: 10 TABLET, FILM COATED ORAL at 21:42

## 2019-09-03 RX ADMIN — IPRATROPIUM BROMIDE AND ALBUTEROL SULFATE 3 ML: .5; 3 SOLUTION RESPIRATORY (INHALATION) at 00:16

## 2019-09-03 RX ADMIN — INSULIN GLARGINE 38 UNITS: 100 INJECTION, SOLUTION SUBCUTANEOUS at 21:41

## 2019-09-03 RX ADMIN — IPRATROPIUM BROMIDE AND ALBUTEROL SULFATE 3 ML: .5; 3 SOLUTION RESPIRATORY (INHALATION) at 07:32

## 2019-09-03 RX ADMIN — INSULIN LISPRO 9 UNITS: 100 INJECTION, SOLUTION INTRAVENOUS; SUBCUTANEOUS at 21:43

## 2019-09-03 RX ADMIN — BUDESONIDE AND FORMOTEROL FUMARATE DIHYDRATE 2 PUFF: 160; 4.5 AEROSOL RESPIRATORY (INHALATION) at 07:32

## 2019-09-03 RX ADMIN — FLUTICASONE PROPIONATE 2 SPRAY: 50 SPRAY, METERED NASAL at 12:48

## 2019-09-03 RX ADMIN — ASPIRIN 81 MG: 81 TABLET, COATED ORAL at 09:14

## 2019-09-03 RX ADMIN — PREDNISONE 50 MG: 20 TABLET ORAL at 09:15

## 2019-09-03 RX ADMIN — LEVOFLOXACIN 750 MG: 5 INJECTION, SOLUTION INTRAVENOUS at 23:14

## 2019-09-03 RX ADMIN — INSULIN LISPRO 6 UNITS: 100 INJECTION, SOLUTION INTRAVENOUS; SUBCUTANEOUS at 12:49

## 2019-09-03 RX ADMIN — SODIUM CHLORIDE, SODIUM ACETATE ANHYDROUS, SODIUM GLUCONATE, POTASSIUM CHLORIDE, AND MAGNESIUM CHLORIDE 500 ML: 526; 222; 502; 37; 30 INJECTION, SOLUTION INTRAVENOUS at 18:20

## 2019-09-03 RX ADMIN — LISINOPRIL 20 MG: 20 TABLET ORAL at 17:30

## 2019-09-03 RX ADMIN — HEPARIN SODIUM 5000 UNITS: 5000 INJECTION INTRAVENOUS; SUBCUTANEOUS at 01:08

## 2019-09-03 NOTE — PROGRESS NOTES
Bedside shift change report given to Naye Joyner RN (oncoming nurse) by Teresa Seip, RN (offgoing nurse).  Report included the following information SBAR, Kardex, Intake/Output, MAR, Recent Results and Cardiac Rhythm SR.

## 2019-09-03 NOTE — PROGRESS NOTES
0700: Bedside and verbal report received from [address].     0800: Head-to-toe completed. Auscultated lungs; expiratory wheezing present in upper anterior and posterior lobes. 1015: Reassessed lungs, expiratory wheezing persist. Denies SOB. RR 20. Continue respiratory treatments. 1600: Reassessed lungs, expiratory wheezing decreased. Denies SOB at rest. RR 20. Continue respiratory treatments. 1715: Wendy Schaeffer from lab reported a critical lactic acid of 3.1. Notified MD.     1900: Bedside shift change report given to Tiffany Galvin RN (oncoming nurse) by Fariba Redding RN (offgoing nurse). Report included the following information SBAR, Kardex, Intake/Output, MAR, Recent Results and Med Rec Status.

## 2019-09-03 NOTE — PROGRESS NOTES
Progress Note  [facility]       Patient: Sanjana Maciel MRN: [id number]  CSN: [id number]    YOB: 1960  Age: 60 y.o. Sex: female    DOA: 8/30/2019 LOS:  LOS: 3 days                    Subjective:     No acute events overnight. Patient feels she is improving but slowly. Drinking and eating well. Endorses SOB, which is improving  States she is coughing up some green sputum  Is using the acapella hourly     Review of Systems   Constitutional: Positive for malaise/fatigue. Negative for chills and fever. Respiratory: Positive for cough and sputum production. Cardiovascular: Negative for chest pain and palpitations. Gastrointestinal: Negative for abdominal pain, nausea and vomiting. Musculoskeletal: Negative for joint pain and myalgias. Skin: Negative for itching. Neurological: Negative for tingling and headaches. Endo/Heme/Allergies: Does not bruise/bleed easily. Psychiatric/Behavioral: Negative for depression and suicidal ideas. Objective:      Patient Vitals for the past 24 hrs:   Temp Pulse Resp BP SpO2   09/03/19 0403 98.3 °F (36.8 °C) 62 24 142/70 95 %   09/02/19 2358 98.2 °F (36.8 °C) 76 13 154/75 93 %   09/02/19 2018 97.2 °F (36.2 °C) 78 15 134/76 92 %   09/02/19 1643 -- -- -- -- 99 %   09/02/19 1640 97.3 °F (36.3 °C) 76 18 150/82 100 %   09/02/19 1210 -- -- -- -- 97 %   09/02/19 1102 97.7 °F (36.5 °C) 66 23 148/81 97 %   09/02/19 0729 97.3 °F (36.3 °C) 73 21 152/81 100 %   09/02/19 0728 -- -- -- -- 100 %         Intake/Output Summary (Last 24 hours) at 9/3/2019 0618  Last data filed at 9/3/2019 0403  Gross per 24 hour   Intake 1110 ml   Output 2350 ml   Net -1240 ml       Physical Exam   Constitutional: She is oriented to person, place, and time. She appears well-developed and well-nourished. No distress (mild). HENT:   Head: Normocephalic and atraumatic. Eyes: Conjunctivae and EOM are normal. Right eye exhibits no discharge.  Left eye exhibits no discharge. Neck: Normal range of motion. Neck supple. No tracheal deviation present. No thyromegaly present. Cardiovascular: Normal rate, regular rhythm and intact distal pulses. Pulmonary/Chest: No respiratory distress. She has wheezes. She has rales. She exhibits no tenderness. Abdominal: Soft. Bowel sounds are normal. She exhibits no distension. Musculoskeletal: Normal range of motion. She exhibits edema (minor trace edema LLE). She exhibits no deformity. Neurological: She is alert and oriented to person, place, and time. No sensory deficit. She exhibits normal muscle tone. Skin: Skin is warm and dry. Capillary refill takes less than 2 seconds. She is not diaphoretic. No erythema. No pallor. Psychiatric: She has a normal mood and affect.  Her behavior is normal. Thought content normal.       Lab/Data Reviewed:  BMP:   No results found for: NA, K, CL, CO2, AGAP, GLU, BUN, CREA, GFRAA, GFRNA  CMP:   No results found for: NA, K, CL, CO2, AGAP, GLU, BUN, CREA, GFRAA, GFRNA, CA, MG, PHOS, ALB, TBIL, TP, ALB, GLOB, AGRAT, SGOT, ALT, GPT  CBC:   No results found for: WBC, HGB, HGBEXT, HCT, HCTEXT, PLT, PLTEXT, HGBEXT, HCTEXT, PLTEXT  All Cardiac Markers in the last 24 hours:   No results found for: CPK, CK, CKMMB, CKMB, RCK3, CKMBT, CKNDX, CKND1, KESHIA, TROPT, TROIQ, JOEL, TROPT, TNIPOC, BNP, BNPP  Recent Glucose Results:   No results found for: GLU  ABG:   No results found for: PH, PHI, PCO2, PCO2I, PO2, PO2I, HCO3, HCO3I, FIO2, FIO2I  COAGS: No results found for: APTT, PTP, INR  Liver Panel:   No results found for: ALB, CBIL, TBIL, TP, GLOB, AGRAT, SGOT, ASTPOC, ALTPOC, ALT, GPT, AP  Pancreatic Markers: No results found for: AMYLPOCT, AML, LIPPOCT, LPSE     Scheduled Medications Reviewed:  Current Facility-Administered Medications   Medication Dose Route Frequency    predniSONE (DELTASONE) tablet 50 mg  50 mg Oral DAILY WITH BREAKFAST    [START ON 9/5/2019] predniSONE (DELTASONE) tablet 40 mg  40 mg Oral DAILY WITH BREAKFAST    [START ON 9/7/2019] predniSONE (DELTASONE) tablet 30 mg  30 mg Oral DAILY WITH BREAKFAST    [START ON 9/9/2019] predniSONE (DELTASONE) tablet 20 mg  20 mg Oral DAILY WITH BREAKFAST    insulin glargine (LANTUS) injection 35 Units  35 Units SubCUTAneous QHS    aspirin delayed-release tablet 81 mg  81 mg Oral DAILY    budesonide-formoterol (SYMBICORT) 160-4.5 mcg/actuation HFA inhaler 2 Puff  2 Puff Inhalation BID RT    fluticasone propionate (FLONASE) 50 mcg/actuation nasal spray 2 Spray  2 Spray Both Nostrils DAILY    [Held by provider] lisinopril (PRINIVIL, ZESTRIL) tablet 20 mg  20 mg Oral BID    montelukast (SINGULAIR) tablet 10 mg  10 mg Oral QHS    heparin (porcine) injection 5,000 Units  5,000 Units SubCUTAneous Q8H    albuterol-ipratropium (DUO-NEB) 2.5 MG-0.5 MG/3 ML  3 mL Nebulization Q4H RT    levoFLOXacin (LEVAQUIN) 750 mg in D5W IVPB  750 mg IntraVENous Q24H    [Held by provider] hydroCHLOROthiazide (MICROZIDE) capsule 12.5 mg  12.5 mg Oral DAILY    pantoprazole (PROTONIX) tablet 40 mg  40 mg Oral ACB    insulin lispro (HUMALOG) injection   SubCUTAneous AC&HS       Imaging, microbiology, and EKG/Telemetry:  Results     Procedure Component Value Units Date/Time    CULTURE, RESPIRATORY/SPUTUM/BRONCH Darlis Slocumb [[id number]] Collected:  08/31/19 2020    Order Status:  Completed Specimen:  Sputum Updated:  09/02/19 1106     Special Requests: NO SPECIAL REQUESTS        GRAM STAIN >25 WBC/lpf         <10 EPI/lpf               RARE GRAM POSITIVE COCCI IN PAIRS            MUCUS PRESENT        Culture result:       MODERATE NORMAL RESPIRATORY TOAN          CULTURE, URINE [[id number]] Collected:  08/30/19 2117    Order Status:  Completed Specimen:  Urine from Clean catch Updated:  09/01/19 1052     Special Requests: NO SPECIAL REQUESTS        Culture result:       [id number]  COLONIES/mL  MIXED GRAM POSITIVE TOAN, PROBABLE SKIN/GENITAL CONTAMINATION.       CULTURE, BLOOD [[id number]] Collected:  19    Order Status:  Completed Specimen:  Blood Updated:  19     Special Requests: NO SPECIAL REQUESTS        Culture result: NO GROWTH 3 DAYS       CULTURE, BLOOD [[id number]] Collected:  19    Order Status:  Completed Specimen:  Blood Updated:  19     Special Requests: NO SPECIAL REQUESTS        Culture result: NO GROWTH 3 DAYS               Assessment/Plan     60 y.o. female with PMH significant for COPD on home O2, IDDM2, HTN, HFpEF, SHERRIE on CPAP, GERD, allergic rhinitis now admitted with COPD exacerbation.     COPD exacerbation patient with severe COPD on 2L of O2 at home when active + trelegy at night and PRN as well as prednisone 10mg every other day. GOLD 2017 unknown grade but group D w/ >2 hospital admissions in 1yr. No recent PFT but expect significant reduction in FEV1. Event likely exacerbated by URI as patient endorsed purulent rhinorrhea, sinus tenderness, increased sputum and cough prior to admission. Lactic acid elevated with last value at 4. No leukocytosis or left shift. Patient is also afebrile but patient states she never gets fever even when she is very ill. However considering the patient is improving clinically and taking PO adequately, will monitor for now.   ABG on 19 post Bipap usage was pH 7.36, pCO2 42, pO2 106, HCO3 234, sO2 98%  Levaquin 750mg QD - reviewed available data on up to date at higher risk for pseudomonas infection/colonization given recent hospitalization and abx use  Goal SpO2 should be between 88-92% in this patient with severe COPD  - Hold home spiriva while on scheduled duoneb treatments  - Sub home advair per pharmacy recs: c/w symbicort  - PT/OT/CM  - Incentive spirometry and acapella in place, pt educated on how to use device   AB.831/43.7/97// Lactic acid 3.2  9/3 lactic acid 2.6   BC no growth at 2 days  9/3 respiratory culture moderate normal respiratory samina  9/3 CBC WNL  9/3 BMP WNL  Plan   FU lactic acid   Scheduled duonebs Q4H  Prednisone 60mg BID for now, suggest a slow taper. Prednisone 50mg BID 9/3-9/4,  Prednisone 40mg BID 9/5-9/6,  Prednisone 30mg BID 9/7-9/8,  Prednisone 20mg BID 9/9-9/10, 9/11/19 Prednisone 10mg which was her home regiment prior to admit  Home trelegy PRN and QHS   Continue home singular  Daily CBC & BMP  Down grade to tele floor today 9/3  mucinex added       Bacteriuria vs UTI patient with no frequency, dysuria but 3+ bacteria noted on UA. No nitrites or LE. ABX as above for COPD exacerbation. Will not treat in setting of asymptomatic bacteriuria   8/30 UC results: 32008  COLONIES/mL  MIXED GRAM POSITIVE TOAN, PROBABLE SKIN/GENITAL CONTAMINATION  8/30 BC no growth at 2 days  Plan   monitor for symptoms    IDDM2 moderately controleld with last A1C 8.1 7/2019. Taking lantus 30 units QHS, does note that her blood sugars elevate significantly with steroid therapy.  Currently BG is uncontrolled with ranges in 200-300s  Last 24hr range 282-321  Plan  - SSI QACHS  - Increased lantus to 38 units a day    - Diabetic diet   - Will monitor BS closely     HTN   24 hr range 134-154/75-76  Plan  - restarted 9/3 home lisinopril 20mg BID due to lower BP  - restarted 9/3 home HCTZ 12.5 every day    Constipation   Has not had a bowel movement in 2 days  Plan  Given sonya lax  Performed Mesenteric release      HFpEF? last echo with EF 66-70% 7/2018, noted prior history of diastolic dysfunction.  - Continue home Aspirin for now  Plan  - Monitor for fluid overload  - I&O  - Daily weights      GERD well controlled  - Will give IV protonix for now with high dose steroid and increase risk for bleeding      Allergic rhinitis  - Continue home flonase  - Hold home cetirizine      Diet: cardiac  DVT Prophylaxis: heparin  Code Status: full  Point of Contact: Kiersten Wood (daughter) [phone]     Disposition and anticipated LOS: >2mnt    [facility] DO PGY 1 [address] 09/03/19 6:42 AM

## 2019-09-03 NOTE — DIABETES MGMT
Glycemic Control Plan of Care    T2DM with current A1c of 8.1% (6/3/2019). See separate notes, 9/03/2019, for assessment of home diabetes management and education. Patient stated that chronic use of steroids is making it difficult to achieve/maintain diabetes control. Home diabetes medications: Patient reported on 9/03/2019:  Lantus insulin 30 units daily at bedtime. Novolog sliding scale insulin TID AC. POC BG range on 9/02/2019: 283-313 mg/dL. Patient on 35 units basal lantus and very resistant dose of correctional lispro insulin. POC BG report on 9/03/2019 at time of review: 137, 219 mg/dL. Noted provider increased basal lantus insulin dose to 38 units daily at bedtime. Patient on tapering dose of prednisone. Recommendation(s):  1.) Cont inpatient glycemic monitoring and insulin orders: basal lantus and correctional lispro. Assessment:  Patient is 60year old with past medical history including type 2 diabetes mellitus, COPD on home oxygen 2L, diastolic CHF, morbid obesity, hypertension and obstructive sleep apnea on Cpap - was admitted on 8/30/2019 with report of increasing shortness of breath. Noted:  COPD Exacerbation.  T2DM. Most recent blood glucose values:    Results for Jaylene Schultz (MRN [id number]) as of 9/3/2019 11:27   Ref. Range 9/2/2019 07:31 9/2/2019 11:04 9/2/2019 16:44 9/2/2019 21:54   GLUCOSE,FAST - POC Latest Ref Range: 70 - 110 mg/dL 288 (H) 313 (H) 313 (H) 283 (H)     Results for Jaylene Schultz (MRN [id number]) as of 9/3/2019 11:27   Ref. Range 9/3/2019 08:10 9/3/2019 11:21   GLUCOSE,FAST - POC Latest Ref Range: 70 - 110 mg/dL 137 (H) 219 (H)     Current A1C: 8.1% (6/30/2019) which is equivalent to estimated average blood glucose of 186 mg/dL during the past 2-3 months. Current hospital diabetes medications:  Basal lantus insulin 38 units daily at bedtime. Correctional lispro insulin ACHS. Very resistant dose.     Total daily dose insulin requirement previous day: 9/02/2019:  Lantus: 35 units  Lispro: 42 units  TDD insulin: 77 units    Home diabetes medications: Patient reported on 9/03/2019:  Lantus insulin 30 units daily at bedtime. Novolog sliding scale insulin TID AC. Diet: Diabetic consistent carb regular; no concentrated sweets;no chicken. Goals:  Blood glucose will be within target range of  mg/dL by 9/06/2019.     Education:  __X_  Refer to Diabetes Education Record: 9/03/2019             ___  Education not indicated at this time    Margaret Ahumada RN [facility]  Pager: [phone]

## 2019-09-03 NOTE — PROGRESS NOTES
0800: Bedside and Verbal shift change report given to Tomasa Resendiz RN andJOSÉ MIGUEL Sifuentes  (oncoming nurse) by Brigitte Verduzco (offgoing nurse). Report included the following information SBAR, Kardex, Intake/Output, MAR, Accordion, Recent Results, Cardiac Rhythm NSR. and Alarm Parameters . Complete assessment performed. AAOx4. Denies pain or discomfort. Breakfast served. Monitor.

## 2019-09-03 NOTE — DIABETES MGMT
Diabetes Patient/Family Education Record  Factors That  May Influence Patients Ability  to Learn or  Comply with Recommendations   []   Language barrier    []   Cultural needs   []   Motivation    []   Cognitive limitation    []   Physical   [x]   Education    []   Physiological factors   []   Hearing/vision/speaking impairment   []   Yazidism beliefs    []   Financial factors   []  Other:   []  No factors identified at this time. Person Instructed:   [x]   Patient   []   Family   []  Other     Preference for Learning:   [x]   Verbal   []   Written   []  Demonstration     Level of Comprehension & Competence:    [x]  Good                                      [] Fair                                     []  Poor                             []  Needs Reinforcement   [x]  Teachback completed    Education Component:   [x]  Medication management, including how to administer insulin (if appropriate) and potential medication interactions: Yes. Patient reported list of home diabetes medications:  Lantus insulin 30 units daily at bedtime. Novolog sliding scale insulin TID AC. [x]  Nutritional management -obtain usual meal pattern: Yes. Patient able to articulate basic nutritional recommendations for management of diabetes. Patient stated that I spoke to her in July 2019 hospital admission and she's able to recall information about the recommended serving size/portion control of carbs (starches, fruits, dairy) and limiting intake of concentrated sweets. [x]  Exercise: Yes. Patient knows the importance of taking rest breaks to help prevent problem with breathing due to history of COPD. [x]  Signs, symptoms, and treatment of hyperglycemia and hypoglycemia: Yes. [x] Prevention, recognition and treatment of hyperglycemia and hypoglycemia: Yes.    [x]  Importance of blood glucose monitoring and how to obtain a blood glucose meter: Yes.    []  Instruction on use of the blood glucose meter   [x]  Discuss the importance of HbA1C monitoring:Yes. Patient's current A1c level is 8.1% (6/30/2019) which is equivalent to estimated average blood glucose of 186 mg/dL during the past 2-3 months. Patient stated that chronic use of steroids is affecting her diabetes control. []  Sick day guidelines   [x]  Proper use and disposal of lancets, needles, syringes or insulin pens (if appropriate): Yes. [x]  Potential long-term complications (retinopathy, kidney disease, neuropathy, foot care): Yes. [x] Information about whom to contact in case of emergency or for more information: Yes. [x]  Goal:  Patient/family will demonstrate understanding of Diabetes Self Management Skills by: 9/10/2019  Plan for post-discharge education or self-management support:    [] Outpatient class schedule provided            [x] Patient Declined: Patient stated that she still have copy of the diabetes educational flyer which I gave her in July 2019,    [] Scheduled for outpatient classes (date) _______  Verify:  Does patient understand how diabetes medications work? Yes. Does patient know what their most recent A1c is? Yes. Does patient monitor glucose at home? Yes. Does patient have difficulty obtaining diabetes medications and testing supplies?  No.       Fredi Koch RN [facility]  Pager: [phone]

## 2019-09-04 LAB
ANION GAP SERPL CALC-SCNC: 4 MMOL/L (ref 3–18)
BASOPHILS # BLD: 0 K/UL (ref 0–0.1)
BASOPHILS NFR BLD: 0 % (ref 0–2)
BUN SERPL-MCNC: 20 MG/DL (ref 7–18)
BUN/CREAT SERPL: 21 (ref 12–20)
CALCIUM SERPL-MCNC: 9.3 MG/DL (ref 8.5–10.1)
CHLORIDE SERPL-SCNC: 99 MMOL/L (ref 100–111)
CO2 SERPL-SCNC: 35 MMOL/L (ref 21–32)
CREAT SERPL-MCNC: 0.96 MG/DL (ref 0.6–1.3)
DIFFERENTIAL METHOD BLD: ABNORMAL
EOSINOPHIL # BLD: 0 K/UL (ref 0–0.4)
EOSINOPHIL NFR BLD: 0 % (ref 0–5)
ERYTHROCYTE [DISTWIDTH] IN BLOOD BY AUTOMATED COUNT: 13.2 % (ref 11.6–14.5)
GLUCOSE BLD STRIP.AUTO-MCNC: 116 MG/DL (ref 70–110)
GLUCOSE BLD STRIP.AUTO-MCNC: 188 MG/DL (ref 70–110)
GLUCOSE BLD STRIP.AUTO-MCNC: 239 MG/DL (ref 70–110)
GLUCOSE BLD STRIP.AUTO-MCNC: 321 MG/DL (ref 70–110)
GLUCOSE SERPL-MCNC: 120 MG/DL (ref 74–99)
HCT VFR BLD AUTO: 36 % (ref 35–45)
HGB BLD-MCNC: 12.4 G/DL (ref 12–16)
LACTATE SERPL-SCNC: 2 MMOL/L (ref 0.4–2)
LYMPHOCYTES # BLD: 2.8 K/UL (ref 0.9–3.6)
LYMPHOCYTES NFR BLD: 31 % (ref 21–52)
MCH RBC QN AUTO: 30.2 PG (ref 24–34)
MCHC RBC AUTO-ENTMCNC: 34.4 G/DL (ref 31–37)
MCV RBC AUTO: 87.6 FL (ref 74–97)
MONOCYTES # BLD: 0.8 K/UL (ref 0.05–1.2)
MONOCYTES NFR BLD: 9 % (ref 3–10)
NEUTS SEG # BLD: 5.4 K/UL (ref 1.8–8)
NEUTS SEG NFR BLD: 60 % (ref 40–73)
PLATELET # BLD AUTO: 279 K/UL (ref 135–420)
PMV BLD AUTO: 9.1 FL (ref 9.2–11.8)
POTASSIUM SERPL-SCNC: 3.7 MMOL/L (ref 3.5–5.5)
RBC # BLD AUTO: 4.11 M/UL (ref 4.2–5.3)
SODIUM SERPL-SCNC: 138 MMOL/L (ref 136–145)
WBC # BLD AUTO: 9 K/UL (ref 4.6–13.2)

## 2019-09-04 PROCEDURE — 74011250637 HC RX REV CODE- 250/637: Performed by: STUDENT IN AN ORGANIZED HEALTH CARE EDUCATION/TRAINING PROGRAM

## 2019-09-04 PROCEDURE — 94640 AIRWAY INHALATION TREATMENT: CPT

## 2019-09-04 PROCEDURE — 74011000250 HC RX REV CODE- 250: Performed by: STUDENT IN AN ORGANIZED HEALTH CARE EDUCATION/TRAINING PROGRAM

## 2019-09-04 PROCEDURE — 74011636637 HC RX REV CODE- 636/637: Performed by: FAMILY MEDICINE

## 2019-09-04 PROCEDURE — 80048 BASIC METABOLIC PNL TOTAL CA: CPT

## 2019-09-04 PROCEDURE — 83605 ASSAY OF LACTIC ACID: CPT

## 2019-09-04 PROCEDURE — 77010033678 HC OXYGEN DAILY

## 2019-09-04 PROCEDURE — 74011636637 HC RX REV CODE- 636/637: Performed by: STUDENT IN AN ORGANIZED HEALTH CARE EDUCATION/TRAINING PROGRAM

## 2019-09-04 PROCEDURE — 82962 GLUCOSE BLOOD TEST: CPT

## 2019-09-04 PROCEDURE — 74011250636 HC RX REV CODE- 250/636: Performed by: STUDENT IN AN ORGANIZED HEALTH CARE EDUCATION/TRAINING PROGRAM

## 2019-09-04 PROCEDURE — 36415 COLL VENOUS BLD VENIPUNCTURE: CPT

## 2019-09-04 PROCEDURE — 94762 N-INVAS EAR/PLS OXIMTRY CONT: CPT

## 2019-09-04 PROCEDURE — 65270000029 HC RM PRIVATE

## 2019-09-04 PROCEDURE — 85025 COMPLETE CBC W/AUTO DIFF WBC: CPT

## 2019-09-04 PROCEDURE — 74011000258 HC RX REV CODE- 258: Performed by: STUDENT IN AN ORGANIZED HEALTH CARE EDUCATION/TRAINING PROGRAM

## 2019-09-04 RX ORDER — LEVOFLOXACIN 250 MG/1
750 TABLET ORAL EVERY 24 HOURS
Status: DISCONTINUED | OUTPATIENT
Start: 2019-09-05 | End: 2019-09-05 | Stop reason: ALTCHOICE

## 2019-09-04 RX ADMIN — INSULIN LISPRO 3 UNITS: 100 INJECTION, SOLUTION INTRAVENOUS; SUBCUTANEOUS at 12:09

## 2019-09-04 RX ADMIN — POLYETHYLENE GLYCOL 3350 17 G: 17 POWDER, FOR SOLUTION ORAL at 08:45

## 2019-09-04 RX ADMIN — INSULIN GLARGINE 38 UNITS: 100 INJECTION, SOLUTION SUBCUTANEOUS at 21:21

## 2019-09-04 RX ADMIN — BUDESONIDE AND FORMOTEROL FUMARATE DIHYDRATE 2 PUFF: 160; 4.5 AEROSOL RESPIRATORY (INHALATION) at 08:51

## 2019-09-04 RX ADMIN — INSULIN LISPRO 12 UNITS: 100 INJECTION, SOLUTION INTRAVENOUS; SUBCUTANEOUS at 17:12

## 2019-09-04 RX ADMIN — IPRATROPIUM BROMIDE AND ALBUTEROL SULFATE 3 ML: .5; 3 SOLUTION RESPIRATORY (INHALATION) at 08:51

## 2019-09-04 RX ADMIN — HEPARIN SODIUM 5000 UNITS: 5000 INJECTION INTRAVENOUS; SUBCUTANEOUS at 12:09

## 2019-09-04 RX ADMIN — HEPARIN SODIUM 5000 UNITS: 5000 INJECTION INTRAVENOUS; SUBCUTANEOUS at 17:14

## 2019-09-04 RX ADMIN — PREDNISONE 50 MG: 20 TABLET ORAL at 08:41

## 2019-09-04 RX ADMIN — GUAIFENESIN 600 MG: 600 TABLET, EXTENDED RELEASE ORAL at 08:41

## 2019-09-04 RX ADMIN — IPRATROPIUM BROMIDE AND ALBUTEROL SULFATE 3 ML: .5; 3 SOLUTION RESPIRATORY (INHALATION) at 16:18

## 2019-09-04 RX ADMIN — IPRATROPIUM BROMIDE AND ALBUTEROL SULFATE 3 ML: .5; 3 SOLUTION RESPIRATORY (INHALATION) at 00:39

## 2019-09-04 RX ADMIN — ASPIRIN 81 MG: 81 TABLET, COATED ORAL at 08:40

## 2019-09-04 RX ADMIN — IPRATROPIUM BROMIDE AND ALBUTEROL SULFATE 3 ML: .5; 3 SOLUTION RESPIRATORY (INHALATION) at 03:51

## 2019-09-04 RX ADMIN — FLUTICASONE PROPIONATE 2 SPRAY: 50 SPRAY, METERED NASAL at 09:00

## 2019-09-04 RX ADMIN — MONTELUKAST 10 MG: 10 TABLET, FILM COATED ORAL at 21:21

## 2019-09-04 RX ADMIN — INSULIN LISPRO 6 UNITS: 100 INJECTION, SOLUTION INTRAVENOUS; SUBCUTANEOUS at 21:22

## 2019-09-04 RX ADMIN — LISINOPRIL 20 MG: 20 TABLET ORAL at 08:41

## 2019-09-04 RX ADMIN — BUDESONIDE AND FORMOTEROL FUMARATE DIHYDRATE 2 PUFF: 160; 4.5 AEROSOL RESPIRATORY (INHALATION) at 20:42

## 2019-09-04 RX ADMIN — SODIUM CHLORIDE, SODIUM ACETATE ANHYDROUS, SODIUM GLUCONATE, POTASSIUM CHLORIDE, AND MAGNESIUM CHLORIDE: 526; 222; 502; 37; 30 INJECTION, SOLUTION INTRAVENOUS at 06:50

## 2019-09-04 RX ADMIN — HEPARIN SODIUM 5000 UNITS: 5000 INJECTION INTRAVENOUS; SUBCUTANEOUS at 01:59

## 2019-09-04 RX ADMIN — GUAIFENESIN 600 MG: 600 TABLET, EXTENDED RELEASE ORAL at 21:21

## 2019-09-04 RX ADMIN — IPRATROPIUM BROMIDE AND ALBUTEROL SULFATE 3 ML: .5; 3 SOLUTION RESPIRATORY (INHALATION) at 20:42

## 2019-09-04 RX ADMIN — PANTOPRAZOLE SODIUM 40 MG: 40 TABLET, DELAYED RELEASE ORAL at 08:41

## 2019-09-04 NOTE — PROGRESS NOTES
This patient meets the following P&T approved criteria and has been converted from IV to oral therapy for the following medication: Levofloxacin    1. INITIAL IV Therapy  Patient has received an initial 48 hours of IV therapy for azithromycin and levetiracetam OR received an initial 24 hours of IV therapy for all other medications. 2. Clinically Stable   HR?100 Pulse (Heart Rate): 60   SBP ?90 BP: 125/64   RR ? 24 Resp Rate: 18   Temp < 100.4° F Temp: 98.8 °F (37.1 °C)   O2 ?90% O2 Sat (%): 98 %   3. Functional GI Tract  Please note that a No response means the patient is not a candidate for IV to PO conversion   No active NPO order (check order review activity) Yes   Taking enteral meds (PO, NG, GT, etc)   (check medication activity or order review for a tube) Yes   Able to tolerate enteral medications without risk of aspiration   (check progress notes) Yes   Non-tubed patients can swallow without difficulty  (check progress notes) Yes   Eating or tolerating feeds at goal rate   (check progress notes, order review activity, LDA flowsheet)  Diet = diabetic   Yes   NG access is available if patient is unconscious Yes   NG tube, if present,  is not on continuous suction   (check progress notes) NA   Continuous tube feedings can be interrupted for an extended period of time for the drug administration (e.g. Ciprofloxacin)   (check progress notes, order review) NA   No severe or persistent nausea or vomiting       (check progress notes, use of antiemetics) Yes   No malabsorption  syndromes   (e.g. gastrectomy, intestinal resection, bariatric surgery, uncontrolled diarrhea, short bowel syndrome, ileus, gastrointestinal obstruction)   (check progress notes) Yes   No active gastrointestinal bleeding     (check progress notes)   Yes   4.  Infection Specific Criteria (ABX only)  Please note that a No response means the patient is not a candidate for IV to PO conversion   WBC normal or normalizing (check labs) Yes    WBC =  Lab Results   Component Value Date/Time    WBC 9.0 09/04/2019 06:10 AM      Neutropenia (ANC < 1500 cells/microL) NOT present  (check labs)   Yes   No infection with high treatment failure rate  (e.g. meningitis, brain abscess,orbital cellulitis, other central nervous system infections, endophthalmitis, mediastinitis,  IV TMP/SMX treatment for PCP in AIDs, fungemia, ventriculitis, endocarditis, Pseudomonas pneumonia, intra-abdominal abscess, empyema, necrotizing soft tissue infections, osteomyelitis or post-obstructive pneumonia) (check indication for antibiotic, progress notes) Yes   5. Miscellaneous Criteria    Please note that a No response means the patient is not a candidate for IV to PO conversion   Not on high doses of vasopressor medications        (check medication activity) Yes   Not actively seizing or risk of actively seizing        (check progress notes) Yes     Pharmacist Notes: change to po and monitor   Pharmacy will continue to monitor for the duration of therapy to ensure the patient continues to meet protocol criteria and the conversion remains appropriate.     Cierra Soto, PHARMD, Pharmacist  9/4/2019 11:43 AM

## 2019-09-04 NOTE — DIABETES MGMT
Glycemic Control Plan of Care    T2DM with current A1c of 8.1% (6/3/2019). See separate notes, 9/03/2019, for assessment of home diabetes management and education. Patient stated that chronic use of steroids is making it difficult to achieve/maintain diabetes control. Home diabetes medications: Patient reported on 9/03/2019:  Lantus insulin 30 units daily at bedtime. Novolog sliding scale insulin TID AC. POC BG range on 9/03/2019: 137-313 mg/dL. POC BG report on 9/04/2019 at time of review: 116, 188 mg/dL. Patient on tapering dose of prednisone. Patient currently on 38 units of basal lantus insulin daily at bedtime and very resistant dose of correctional lispro insulin. Recommendation(s):  1.) Cont inpatient glycemic monitoring and insulin orders: basal lantus and correctional lispro. Assessment:  Patient is 60year old with past medical history including type 2 diabetes mellitus, COPD on home oxygen 2L, diastolic CHF, morbid obesity, hypertension and obstructive sleep apnea on Cpap - was admitted on 8/30/2019 with report of increasing shortness of breath. Noted:  COPD Exacerbation.  T2DM. Most recent blood glucose values:    Results for Erin Archer (MRN [id number]) as of 9/4/2019 14:35   Ref. Range 9/3/2019 08:10 9/3/2019 11:21 9/3/2019 16:38 9/3/2019 21:38   GLUCOSE,FAST - POC Latest Ref Range: 70 - 110 mg/dL 137 (H) 219 (H) 313 (H) 270 (H)     Results for Erin Archer (MRN [id number]) as of 9/4/2019 14:35   Ref. Range 9/4/2019 07:14 9/4/2019 11:43   GLUCOSE,FAST - POC Latest Ref Range: 70 - 110 mg/dL 116 (H) 188 (H)     Current A1C: 8.1% (6/30/2019) which is equivalent to estimated average blood glucose of 186 mg/dL during the past 2-3 months. Current hospital diabetes medications:  Basal lantus insulin 38 units daily at bedtime. Correctional lispro insulin ACHS. Very resistant dose.     Total daily dose insulin requirement previous day: 9/03/2019:  Lantus: 38 units  Lispro: 27 units  TDD insulin: 65 units    Home diabetes medications: Patient reported on 9/03/2019:  Lantus insulin 30 units daily at bedtime. Novolog sliding scale insulin TID AC. Diet: Diabetic consistent carb regular; no concentrated sweets;no chicken. Goals:  Blood glucose will be within target range of  mg/dL by 9/07/2019.     Education:  __X_  Refer to Diabetes Education Record: 9/03/2019             ___  Education not indicated at this time    Mgagie Restrepo RN [facility]  Pager: [phone]

## 2019-09-04 NOTE — PROGRESS NOTES
[address] [facility]  Brief PM Progress Note    Subjective/Objective  Pt had a second lactate of 3.1. Assessed Pt at bedside. Pt appearing clinically improved since admission. Vitals were stable and Pt stated that she does not find much change in her condition since yesterday but feels much better than when she was admitted. Visit Vitals  /73   Pulse 76   Temp 97.3 °F (36.3 °C)   Resp 15   Ht 5' 3\" (1.6 m)   Wt 113.7 kg (250 lb 9.6 oz)   SpO2 96%   BMI 44.39 kg/m²     Physical Exam  Resp: Wheezing on bilateral posterior lung fields. Assessment/Plan  60 y.o. female with PMH significant for COPD on home O2, IDDM2, HTN, HFpEF, SHERRIE on CPAP, GERD, allergic rhinitis now admitted with COPD exacerbation. Elevated Lactic acid: Pt's condition has improved since admission. Pt is being treated for bacterial infection since Pt has severe COPD, however currently COPD exacerbation more likely to be secondary to viral infection. Pt's vitals stable and since Pt's condition has improved since admission elevated lactate less likely to be secondary to end-organ damage and infection. On literature review, found that B-Agonist treatments can also lead to elevated lactate.   -Ordered Lactate for morning labs. -Will see how lactate is in the AM and determine frequency of lactate labs. Rest Per AM note    MD Kelli Hyman.

## 2019-09-04 NOTE — ROUTINE PROCESS
1930 Pt is up on side of bed. Family members at bedside. Pt denies pain. Call bell in reach. 2115 Up on side of bed. No SOB noted. Call bell in reach. 18 Ot is resting in bed. Call bell in reach. 0725 Bedside shift change report given to 1812 Mora Chapman (oncoming nurse) by Saira Madden (offgoing nurse). Report given with SBAR, Kardex, Intake/Output, MAR and Recent Results.

## 2019-09-04 NOTE — PROGRESS NOTES
Progress Note  [facility]       Patient: Alonso Xiao MRN: [id number]  [facility]: [id number]    YOB: 1960  Age: 60 y.o. Sex: female    DOA: 8/30/2019 LOS:  LOS: 4 days                    Subjective:     No acute events overnight. Patient feels she is improving but slowly. Drinking and eating well. Endorses SOB, which is improving  States she is coughing up a small amount of green sputum  Is using the acapella hourly 3  States that is over 70% ready to go home is interested in leaving tomorrow     Review of Systems   Constitutional: Negative for chills, fever and malaise/fatigue. Respiratory: Positive for cough and sputum production. Cardiovascular: Negative for chest pain and palpitations. Gastrointestinal: Negative for abdominal pain, nausea and vomiting. Musculoskeletal: Negative for joint pain and myalgias. Skin: Negative for itching. Neurological: Negative for tingling and headaches. Endo/Heme/Allergies: Does not bruise/bleed easily. Psychiatric/Behavioral: Negative for depression and suicidal ideas.        Objective:      Patient Vitals for the past 24 hrs:   Temp Pulse Resp BP SpO2   09/04/19 0358 97.6 °F (36.4 °C) 98 16 108/66 96 %   09/04/19 0352 -- -- -- -- 96 %   09/04/19 0039 97.5 °F (36.4 °C) 70 19 128/70 95 %   09/03/19 2316 -- 79 19 -- 95 %   09/03/19 2134 -- 76 -- -- --   09/03/19 2113 97.3 °F (36.3 °C) 82 15 133/73 96 %   09/03/19 2042 -- -- -- -- 96 %   09/03/19 1633 97.4 °F (36.3 °C) 78 21 146/73 93 %   09/03/19 1124 -- 73 19 152/75 97 %   09/03/19 1120 97.4 °F (36.3 °C) -- -- -- --   09/03/19 0901 -- 86 20 150/70 92 %   09/03/19 0810 -- 74 22 154/77 94 %   09/03/19 0808 98.1 °F (36.7 °C) 68 19 154/77 95 %         Intake/Output Summary (Last 24 hours) at 9/4/2019 0558  Last data filed at 9/4/2019 0328  Gross per 24 hour   Intake 750 ml   Output 500 ml   Net 250 ml       Physical Exam   Constitutional: She is oriented to person, place, and time. She appears well-developed and well-nourished. No distress (mild). HENT:   Head: Normocephalic and atraumatic. Eyes: Conjunctivae and EOM are normal. Right eye exhibits no discharge. Left eye exhibits no discharge. Neck: Normal range of motion. Neck supple. No tracheal deviation present. No thyromegaly present. Cardiovascular: Normal rate, regular rhythm and intact distal pulses. Pulmonary/Chest: No respiratory distress. She has wheezes. She has rales. She exhibits no tenderness. Abdominal: Soft. Bowel sounds are normal. She exhibits no distension. Musculoskeletal: Normal range of motion. She exhibits edema (minor trace edema LLE). She exhibits no deformity. Neurological: She is alert and oriented to person, place, and time. No sensory deficit. She exhibits normal muscle tone. Skin: Skin is warm and dry. Capillary refill takes less than 2 seconds. She is not diaphoretic. No erythema. No pallor. Psychiatric: She has a normal mood and affect.  Her behavior is normal. Thought content normal.       Lab/Data Reviewed:  BMP:   Lab Results   Component Value Date/Time     09/03/2019 06:18 AM    K 3.5 09/03/2019 06:18 AM     09/03/2019 06:18 AM    CO2 30 09/03/2019 06:18 AM    AGAP 6 09/03/2019 06:18 AM     (H) 09/03/2019 06:18 AM    BUN 21 (H) 09/03/2019 06:18 AM    CREA 0.93 09/03/2019 06:18 AM    GFRAA >60 09/03/2019 06:18 AM    GFRNA >60 09/03/2019 06:18 AM     CMP:   Lab Results   Component Value Date/Time     09/03/2019 06:18 AM    K 3.5 09/03/2019 06:18 AM     09/03/2019 06:18 AM    CO2 30 09/03/2019 06:18 AM    AGAP 6 09/03/2019 06:18 AM     (H) 09/03/2019 06:18 AM    BUN 21 (H) 09/03/2019 06:18 AM    CREA 0.93 09/03/2019 06:18 AM    GFRAA >60 09/03/2019 06:18 AM    GFRNA >60 09/03/2019 06:18 AM    CA 9.4 09/03/2019 06:18 AM     CBC:   Lab Results   Component Value Date/Time    WBC 8.9 09/03/2019 06:18 AM    HGB 11.8 (L) 09/03/2019 06:18 AM    HCT 35.0 09/03/2019 06:18 AM     09/03/2019 06:18 AM     All Cardiac Markers in the last 24 hours:   No results found for: CPK, CK, CKMMB, CKMB, RCK3, CKMBT, CKNDX, CKND1, KESHIA, TROPT, TROIQ, JOEL, TROPT, TNIPOC, BNP, BNPP  Recent Glucose Results:   Lab Results   Component Value Date/Time     (H) 09/03/2019 06:18 AM     ABG:   No results found for: PH, PHI, PCO2, PCO2I, PO2, PO2I, HCO3, HCO3I, FIO2, FIO2I  COAGS: No results found for: APTT, PTP, INR  Liver Panel:   No results found for: ALB, CBIL, TBIL, TP, GLOB, AGRAT, SGOT, ASTPOC, ALTPOC, ALT, GPT, AP  Pancreatic Markers: No results found for: AMYLPOCT, AML, LIPPOCT, LPSE     Scheduled Medications Reviewed:  Current Facility-Administered Medications   Medication Dose Route Frequency    insulin glargine (LANTUS) injection 38 Units  38 Units SubCUTAneous QHS    polyethylene glycol (MIRALAX) packet 17 g  17 g Oral DAILY    guaiFENesin ER (MUCINEX) tablet 600 mg  600 mg Oral Q12H    predniSONE (DELTASONE) tablet 50 mg  50 mg Oral DAILY WITH BREAKFAST    [START ON 9/5/2019] predniSONE (DELTASONE) tablet 40 mg  40 mg Oral DAILY WITH BREAKFAST    [START ON 9/7/2019] predniSONE (DELTASONE) tablet 30 mg  30 mg Oral DAILY WITH BREAKFAST    [START ON 9/9/2019] predniSONE (DELTASONE) tablet 20 mg  20 mg Oral DAILY WITH BREAKFAST    aspirin delayed-release tablet 81 mg  81 mg Oral DAILY    budesonide-formoterol (SYMBICORT) 160-4.5 mcg/actuation HFA inhaler 2 Puff  2 Puff Inhalation BID RT    fluticasone propionate (FLONASE) 50 mcg/actuation nasal spray 2 Spray  2 Spray Both Nostrils DAILY    lisinopril (PRINIVIL, ZESTRIL) tablet 20 mg  20 mg Oral BID    montelukast (SINGULAIR) tablet 10 mg  10 mg Oral QHS    heparin (porcine) injection 5,000 Units  5,000 Units SubCUTAneous Q8H    albuterol-ipratropium (DUO-NEB) 2.5 MG-0.5 MG/3 ML  3 mL Nebulization Q4H RT    levoFLOXacin (LEVAQUIN) 750 mg in D5W IVPB  750 mg IntraVENous Q24H    hydroCHLOROthiazide (MICROZIDE) capsule 12.5 mg  12.5 mg Oral DAILY    pantoprazole (PROTONIX) tablet 40 mg  40 mg Oral ACB    insulin lispro (HUMALOG) injection   SubCUTAneous AC&HS       Imaging, microbiology, and EKG/Telemetry:  Results     Procedure Component Value Units Date/Time    CULTURE, RESPIRATORY/SPUTUM/BRONCH Ian Sands [[id number]] Collected:  08/31/19 2020    Order Status:  Completed Specimen:  Sputum Updated:  09/03/19 0923     Special Requests: NO SPECIAL REQUESTS        GRAM STAIN >25 WBC/lpf         <10 EPI/lpf               RARE GRAM POSITIVE COCCI IN PAIRS            MUCUS PRESENT        Culture result:       MODERATE NORMAL RESPIRATORY TOAN          CULTURE, URINE [[id number]] Collected:  08/30/19 2117    Order Status:  Completed Specimen:  Urine from Clean catch Updated:  09/01/19 1052     Special Requests: NO SPECIAL REQUESTS        Culture result:       [id number]  COLONIES/mL  MIXED GRAM POSITIVE TOAN, PROBABLE SKIN/GENITAL CONTAMINATION. CULTURE, BLOOD [[id number]] Collected:  08/30/19 2018    Order Status:  Completed Specimen:  Blood Updated:  09/03/19 0740     Special Requests: NO SPECIAL REQUESTS        Culture result: NO GROWTH 4 DAYS       CULTURE, BLOOD [[id number]] Collected:  08/30/19 2005    Order Status:  Completed Specimen:  Blood Updated:  09/03/19 0740     Special Requests: NO SPECIAL REQUESTS        Culture result: NO GROWTH 4 DAYS               Assessment/Plan     60 y.o. female with PMH significant for COPD on home O2, IDDM2, HTN, HFpEF, SHERRIE on CPAP, GERD, allergic rhinitis now admitted with COPD exacerbation.     COPD exacerbation patient with severe COPD on 2L of O2 at home when active + trelegy at night and PRN as well as prednisone 10mg every other day. GOLD 2017 unknown grade but group D w/ >2 hospital admissions in 1yr. No recent PFT but expect significant reduction in FEV1.    Event likely exacerbated by URI as patient endorsed purulent rhinorrhea, sinus tenderness, increased sputum and cough prior to admission. Lactic acid elevated with last value at 4. No leukocytosis or left shift. Patient is also afebrile but patient states she never gets fever even when she is very ill. However considering the patient is improving clinically and taking PO adequately, will monitor for now. ABG on 19 post Bipap usage was pH 7.36, pCO2 42, pO2 106, HCO3 234, sO2 98%  Levaquin 750mg QD - reviewed available data on up to date at higher risk for pseudomonas infection/colonization given recent hospitalization and abx use  Goal SpO2 should be between 88-92% in this patient with severe COPD  - Hold home spiriva while on scheduled duoneb treatments  - Sub home advair per pharmacy recs: c/w symbicort  - PT/OT/CM  - Incentive spirometry and acapella in place, pt educated on how to use device   AB.831/43.7/97// Lactic acid 3.2  9/3 lactic acid 2.6>3.1>3.1>2.0   no growth at 2 days  9/3 respiratory culture moderate normal respiratory samina  9/3 CBC WNL  9/3 BMP WNL  Plan   Lactic acid stopped   Scheduled duonebs Q4H  Prednisone 60mg BID for now, suggest a slow taper. Prednisone 50mg BID 9/3-,  Prednisone 40mg BID -,  Prednisone 30mg BID -,  Prednisone 20mg BID -9/10, 19 Prednisone 10mg which was her home regiment prior to admit  Home trelegy PRN and QHS   Continue home singular  Daily CBC & BMP  Down grade to tele floor today 9/3  mucinex added  NS 50ml/hr         Bacteriuria vs UTI patient with no frequency, dysuria but 3+ bacteria noted on UA. No nitrites or LE. ABX as above for COPD exacerbation. Will not treat in setting of asymptomatic bacteriuria    UC results: 32350  COLONIES/mL  MIXED GRAM POSITIVE SAMINA, PROBABLE SKIN/GENITAL CONTAMINATION   BC no growth at 2 days  Plan   monitor for symptoms    IDDM2 moderately controleld with last A1C 8.1 2019.  Taking lantus 30 units QHS, does note that her blood sugars elevate significantly with steroid therapy.  Currently BG is uncontrolled with ranges in 200-300s  Last 24hr range 154-313  Plan  - SSI QACHS  - Increased lantus to 38 units a day    - Diabetic diet   - Will monitor BS closely     HTN   24 hr range 108-154/66-77  Plan  - restarted 9/3 home lisinopril 20mg BID due to lower BP  - restarted 9/3 home HCTZ 12.5 every day    Constipation   Has not had a bowel movement in 2 days  Plan  Given sonya lax  Performed Mesenteric release      HFpEF? last echo with EF 66-70% 7/2018, noted prior history of diastolic dysfunction.  - Continue home Aspirin for now  Plan  - Monitor for fluid overload  - I&O  - Daily weights      GERD well controlled  - Will give IV protonix for now with high dose steroid and increase risk for bleeding      Allergic rhinitis  - Continue home flonase  - Hold home cetirizine      Diet: cardiac  DVT Prophylaxis: heparin  Code Status: full  Point of Contact: Zofia Gil (daughter) [phone]     Disposition and anticipated LOS: >2mnt    Waleska Hand DO  [facility] Resident  [facility]  09/04/19 6:42 AM

## 2019-09-04 NOTE — PROGRESS NOTES
Reason for Admission:   COPD exacerbation ([facility] [address] [J44.1]               RRAT Score:    40             Resources/supports as identified by patient/family:   Patient lives with daughterMalissa and has personal care aide. Top Challenges facing patient (as identified by patient/family and CM): Finances/Medication cost?    No concerns voiced. Transportation      Daughter and medicaid transportation. Support system or lack thereof? Good support system. Living arrangements? Lives with daughter. Self-care/ADLs/Cognition? Alert and orient and need assistance ADL's & IADL's. Current Advanced Directive/Advance Care Plan:   no                          Plan for utilizing home health: To be determined. Likelihood of readmission:   HIGH              Initial assessment completed with patient. Cognitive status of patient: oriented to time, place, person and situation. Face sheet information confirmed:  yes. The patient designates Esme Chavez daughter ([phone]) to participate in her discharge plan and to receive any needed information. This patient lives in a duplex home with daughter. Patient is not able to navigate steps as needed. Prior to hospitalization, patient was considered to be independent with ADLs/IADLS : no . If not independent,  patient needs assist with : dressing, bathing, food preparation and cooking, cleaning, and laundry. Patient [facility] 5 days a week 6 hrs a day. Patient has a current ACP document on file: no  The patient and daughter will be available to transport patient home upon discharge. The patient already has Walker, BSC, shower chair, oxygen concentrator and tanks ( 2 Liters via NC)  and CPAP medical equipment available in the home. Patient is not currently active with home health. Patient has not stayed in a skilled nursing facility or rehab.         This patient is on dialysis :no    Freedom of choice signed: no.     Currently, the discharge plan is Home with family assistance and personal care aide. - Patient refused home health unless a skilled nurse is needed. Patient stated \" I don't want home health for physical therapy. I will accept a skilled nurse if I need one. \"    The patient states that she can obtain her medications from the pharmacy, and take her medications as directed. Patient's current insurance is [employer] Medicare Part A & B and [facility] Medicaid. Care Management Interventions  PCP Verified by CM: Yes  Last Visit to PCP: 08/26/19  Mode of Transport at Discharge: Self  Transition of Care Consult (CM Consult): Discharge Planning  MyChart Signup: No  Discharge Durable Medical Equipment: No  Physical Therapy Consult: No  Occupational Therapy Consult: No  Speech Therapy Consult: No  Current Support Network: Relative's Home  Confirm Follow Up Transport: Family  Plan discussed with Pt/Family/Caregiver: Yes  Discharge Location  Discharge Placement: Home with family assistance      ANA M Higgins, RN  Pager # [phone]  Care Manager

## 2019-09-05 VITALS
OXYGEN SATURATION: 95 % | RESPIRATION RATE: 18 BRPM | HEART RATE: 74 BPM | TEMPERATURE: 98.7 F | BODY MASS INDEX: 44.44 KG/M2 | SYSTOLIC BLOOD PRESSURE: 115 MMHG | DIASTOLIC BLOOD PRESSURE: 68 MMHG | WEIGHT: 250.8 LBS | HEIGHT: 63 IN

## 2019-09-05 LAB
ANION GAP SERPL CALC-SCNC: 5 MMOL/L (ref 3–18)
BACTERIA SPEC CULT: NORMAL
BACTERIA SPEC CULT: NORMAL
BASOPHILS # BLD: 0 K/UL (ref 0–0.1)
BASOPHILS NFR BLD: 0 % (ref 0–2)
BUN SERPL-MCNC: 19 MG/DL (ref 7–18)
BUN/CREAT SERPL: 19 (ref 12–20)
CALCIUM SERPL-MCNC: 8.8 MG/DL (ref 8.5–10.1)
CHLORIDE SERPL-SCNC: 98 MMOL/L (ref 100–111)
CO2 SERPL-SCNC: 33 MMOL/L (ref 21–32)
CREAT SERPL-MCNC: 1.01 MG/DL (ref 0.6–1.3)
DIFFERENTIAL METHOD BLD: ABNORMAL
EOSINOPHIL # BLD: 0 K/UL (ref 0–0.4)
EOSINOPHIL NFR BLD: 0 % (ref 0–5)
ERYTHROCYTE [DISTWIDTH] IN BLOOD BY AUTOMATED COUNT: 13.3 % (ref 11.6–14.5)
GLUCOSE BLD STRIP.AUTO-MCNC: 105 MG/DL (ref 70–110)
GLUCOSE BLD STRIP.AUTO-MCNC: 169 MG/DL (ref 70–110)
GLUCOSE BLD STRIP.AUTO-MCNC: 316 MG/DL (ref 70–110)
GLUCOSE SERPL-MCNC: 182 MG/DL (ref 74–99)
HCT VFR BLD AUTO: 36.8 % (ref 35–45)
HGB BLD-MCNC: 12.4 G/DL (ref 12–16)
LYMPHOCYTES # BLD: 2.5 K/UL (ref 0.9–3.6)
LYMPHOCYTES NFR BLD: 26 % (ref 21–52)
MCH RBC QN AUTO: 29.8 PG (ref 24–34)
MCHC RBC AUTO-ENTMCNC: 33.7 G/DL (ref 31–37)
MCV RBC AUTO: 88.5 FL (ref 74–97)
MONOCYTES # BLD: 0.6 K/UL (ref 0.05–1.2)
MONOCYTES NFR BLD: 7 % (ref 3–10)
NEUTS SEG # BLD: 6.6 K/UL (ref 1.8–8)
NEUTS SEG NFR BLD: 67 % (ref 40–73)
PLATELET # BLD AUTO: 299 K/UL (ref 135–420)
PMV BLD AUTO: 8.9 FL (ref 9.2–11.8)
POTASSIUM SERPL-SCNC: 3.7 MMOL/L (ref 3.5–5.5)
RBC # BLD AUTO: 4.16 M/UL (ref 4.2–5.3)
SERVICE CMNT-IMP: NORMAL
SERVICE CMNT-IMP: NORMAL
SODIUM SERPL-SCNC: 136 MMOL/L (ref 136–145)
WBC # BLD AUTO: 9.8 K/UL (ref 4.6–13.2)

## 2019-09-05 PROCEDURE — 85025 COMPLETE CBC W/AUTO DIFF WBC: CPT

## 2019-09-05 PROCEDURE — 82962 GLUCOSE BLOOD TEST: CPT

## 2019-09-05 PROCEDURE — 74011636637 HC RX REV CODE- 636/637: Performed by: STUDENT IN AN ORGANIZED HEALTH CARE EDUCATION/TRAINING PROGRAM

## 2019-09-05 PROCEDURE — 80048 BASIC METABOLIC PNL TOTAL CA: CPT

## 2019-09-05 PROCEDURE — 74011250637 HC RX REV CODE- 250/637: Performed by: STUDENT IN AN ORGANIZED HEALTH CARE EDUCATION/TRAINING PROGRAM

## 2019-09-05 PROCEDURE — 94640 AIRWAY INHALATION TREATMENT: CPT

## 2019-09-05 PROCEDURE — 74011000258 HC RX REV CODE- 258: Performed by: STUDENT IN AN ORGANIZED HEALTH CARE EDUCATION/TRAINING PROGRAM

## 2019-09-05 PROCEDURE — 74011636637 HC RX REV CODE- 636/637: Performed by: FAMILY MEDICINE

## 2019-09-05 PROCEDURE — 36415 COLL VENOUS BLD VENIPUNCTURE: CPT

## 2019-09-05 PROCEDURE — 94761 N-INVAS EAR/PLS OXIMETRY MLT: CPT

## 2019-09-05 PROCEDURE — 74011250636 HC RX REV CODE- 250/636: Performed by: STUDENT IN AN ORGANIZED HEALTH CARE EDUCATION/TRAINING PROGRAM

## 2019-09-05 PROCEDURE — 74011250637 HC RX REV CODE- 250/637: Performed by: FAMILY MEDICINE

## 2019-09-05 PROCEDURE — 74011000250 HC RX REV CODE- 250: Performed by: STUDENT IN AN ORGANIZED HEALTH CARE EDUCATION/TRAINING PROGRAM

## 2019-09-05 PROCEDURE — 77010033678 HC OXYGEN DAILY

## 2019-09-05 RX ORDER — PREDNISONE 20 MG/1
20 TABLET ORAL
Qty: 4 TAB | Refills: 0 | Status: SHIPPED | OUTPATIENT
Start: 2019-09-09 | End: 2019-10-14

## 2019-09-05 RX ORDER — PREDNISONE 20 MG/1
40 TABLET ORAL
Qty: 2 TAB | Refills: 0 | Status: SHIPPED | OUTPATIENT
Start: 2019-09-06 | End: 2019-10-14

## 2019-09-05 RX ORDER — PREDNISONE 10 MG/1
30 TABLET ORAL
Qty: 4 TAB | Refills: 0 | Status: SHIPPED | OUTPATIENT
Start: 2019-09-07 | End: 2019-10-14

## 2019-09-05 RX ADMIN — LISINOPRIL 20 MG: 20 TABLET ORAL at 08:08

## 2019-09-05 RX ADMIN — ASPIRIN 81 MG: 81 TABLET, COATED ORAL at 08:09

## 2019-09-05 RX ADMIN — INSULIN LISPRO 3 UNITS: 100 INJECTION, SOLUTION INTRAVENOUS; SUBCUTANEOUS at 11:30

## 2019-09-05 RX ADMIN — IPRATROPIUM BROMIDE AND ALBUTEROL SULFATE 3 ML: .5; 3 SOLUTION RESPIRATORY (INHALATION) at 15:53

## 2019-09-05 RX ADMIN — GUAIFENESIN 600 MG: 600 TABLET, EXTENDED RELEASE ORAL at 08:09

## 2019-09-05 RX ADMIN — FLUTICASONE PROPIONATE 2 SPRAY: 50 SPRAY, METERED NASAL at 09:00

## 2019-09-05 RX ADMIN — IPRATROPIUM BROMIDE AND ALBUTEROL SULFATE 3 ML: .5; 3 SOLUTION RESPIRATORY (INHALATION) at 04:46

## 2019-09-05 RX ADMIN — BUDESONIDE AND FORMOTEROL FUMARATE DIHYDRATE 2 PUFF: 160; 4.5 AEROSOL RESPIRATORY (INHALATION) at 08:32

## 2019-09-05 RX ADMIN — PREDNISONE 40 MG: 20 TABLET ORAL at 08:09

## 2019-09-05 RX ADMIN — IPRATROPIUM BROMIDE AND ALBUTEROL SULFATE 3 ML: .5; 3 SOLUTION RESPIRATORY (INHALATION) at 08:32

## 2019-09-05 RX ADMIN — LEVOFLOXACIN 750 MG: 250 TABLET, FILM COATED ORAL at 01:30

## 2019-09-05 RX ADMIN — SODIUM CHLORIDE, SODIUM ACETATE ANHYDROUS, SODIUM GLUCONATE, POTASSIUM CHLORIDE, AND MAGNESIUM CHLORIDE: 526; 222; 502; 37; 30 INJECTION, SOLUTION INTRAVENOUS at 06:15

## 2019-09-05 RX ADMIN — PANTOPRAZOLE SODIUM 40 MG: 40 TABLET, DELAYED RELEASE ORAL at 08:09

## 2019-09-05 RX ADMIN — INSULIN LISPRO 12 UNITS: 100 INJECTION, SOLUTION INTRAVENOUS; SUBCUTANEOUS at 17:30

## 2019-09-05 RX ADMIN — IPRATROPIUM BROMIDE AND ALBUTEROL SULFATE 3 ML: .5; 3 SOLUTION RESPIRATORY (INHALATION) at 13:33

## 2019-09-05 RX ADMIN — HEPARIN SODIUM 5000 UNITS: 5000 INJECTION INTRAVENOUS; SUBCUTANEOUS at 11:55

## 2019-09-05 RX ADMIN — HEPARIN SODIUM 5000 UNITS: 5000 INJECTION INTRAVENOUS; SUBCUTANEOUS at 01:34

## 2019-09-05 NOTE — ROUTINE PROCESS
1930-Bedside and verbal report from Easton Galan RN. Pt resting, NAD, no c/o pain, no SOB on 2LNC, at baseline, some wheezing at rest. Clear in upper lung fields. No c/o pain, call bell within reach. 2030-Pt resting,NAD, no c/o pain, no SOB at rest,at baseline 2LNC. NSR.call bell within reach. 2130-Pt resting,NAD, NSR. Call bell within reach. 2230-Pt resting,NAD, NSR, assisted to RR, pt strand by assist. No SOB on exertion noted. 0000-Pt resting, NSR, no c/o pain, no SOB at this time. Call bell within reach. 0200-Pt resting,NAD, no c/o pain, no SOB at this time,. Call bell within reach. 0400-Pt resting, NAD, NSR, no c/o pain, no SOB, pt is medical, cardiac monitoring taken off per pt request.     0600-Pt resting,NAD, NSR, no c/o pain, no SOB at this time, at [facility], baseline, sleeping CPAP.     0700-Bedside and verbal report to Easton Galan RN.

## 2019-09-05 NOTE — PROGRESS NOTES
Progress Note  [facility]       Patient: Julianne Link MRN: [id number]  CSN: [id number]    YOB: 1960  Age: 60 y.o. Sex: female    DOA: 8/30/2019 LOS:  LOS: 5 days                    Subjective:     No acute events overnight. Patient feels she is improving but slowly. Drinking and eating well. Endorses SOB, which is improving  States she is coughing up a small amount of green sputum  Is using the acapella hourly   States she is ready to go home    Review of Systems   Constitutional: Negative for chills, fever and malaise/fatigue. Respiratory: Positive for cough. Negative for sputum production. Cardiovascular: Negative for chest pain and palpitations. Gastrointestinal: Negative for abdominal pain, nausea and vomiting. Musculoskeletal: Negative for joint pain and myalgias. Skin: Negative for itching. Neurological: Negative for tingling and headaches. Endo/Heme/Allergies: Does not bruise/bleed easily. Psychiatric/Behavioral: Negative for depression and suicidal ideas. Objective:      Patient Vitals for the past 24 hrs:   Temp Pulse Resp BP SpO2   09/05/19 0527 97.6 °F (36.4 °C) 62 18 132/69 99 %   09/05/19 0446 -- -- -- -- 98 %   09/05/19 0016 98 °F (36.7 °C) 68 18 150/80 100 %   09/04/19 2042 -- -- -- -- 98 %   09/04/19 2003 97.5 °F (36.4 °C) 71 21 131/69 96 %   09/04/19 1627 97.4 °F (36.3 °C) 76 21 114/73 95 %   09/04/19 1620 -- -- -- -- 99 %   09/04/19 1140 97.3 °F (36.3 °C) 70 20 121/67 99 %   09/04/19 0856 -- -- -- -- 98 %   09/04/19 0712 98.8 °F (37.1 °C) 60 18 125/64 96 %         Intake/Output Summary (Last 24 hours) at 9/5/2019 0649  Last data filed at 9/5/2019 0141  Gross per 24 hour   Intake 1647.5 ml   Output 600 ml   Net 1047.5 ml       Physical Exam   Constitutional: She is oriented to person, place, and time. She appears well-developed and well-nourished. No distress (mild). HENT:   Head: Normocephalic and atraumatic.    Eyes: Conjunctivae and EOM are normal. Right eye exhibits no discharge. Left eye exhibits no discharge. Neck: Normal range of motion. Neck supple. No tracheal deviation present. No thyromegaly present. Cardiovascular: Normal rate, regular rhythm and intact distal pulses. Pulmonary/Chest: No respiratory distress. She has wheezes. She has rales. She exhibits no tenderness. Wheezing and rales are deminished compared to prior exam, improved thoracic cage motion during breathing    Abdominal: Soft. Bowel sounds are normal. She exhibits no distension. Musculoskeletal: Normal range of motion. She exhibits no edema (minor trace edema LLE) or deformity. Neurological: She is alert and oriented to person, place, and time. No sensory deficit. She exhibits normal muscle tone. Skin: Skin is warm and dry. Capillary refill takes less than 2 seconds. She is not diaphoretic. No erythema. No pallor. Psychiatric: She has a normal mood and affect.  Her behavior is normal. Thought content normal.       Lab/Data Reviewed:  BMP:   Lab Results   Component Value Date/Time     09/05/2019 02:30 AM    K 3.7 09/05/2019 02:30 AM    CL 98 (L) 09/05/2019 02:30 AM    CO2 33 (H) 09/05/2019 02:30 AM    AGAP 5 09/05/2019 02:30 AM     (H) 09/05/2019 02:30 AM    BUN 19 (H) 09/05/2019 02:30 AM    CREA 1.01 09/05/2019 02:30 AM    GFRAA >60 09/05/2019 02:30 AM    GFRNA 56 (L) 09/05/2019 02:30 AM     CMP:   Lab Results   Component Value Date/Time     09/05/2019 02:30 AM    K 3.7 09/05/2019 02:30 AM    CL 98 (L) 09/05/2019 02:30 AM    CO2 33 (H) 09/05/2019 02:30 AM    AGAP 5 09/05/2019 02:30 AM     (H) 09/05/2019 02:30 AM    BUN 19 (H) 09/05/2019 02:30 AM    CREA 1.01 09/05/2019 02:30 AM    GFRAA >60 09/05/2019 02:30 AM    GFRNA 56 (L) 09/05/2019 02:30 AM    CA 8.8 09/05/2019 02:30 AM     CBC:   Lab Results   Component Value Date/Time    WBC 9.8 09/05/2019 02:30 AM    HGB 12.4 09/05/2019 02:30 AM    HCT 36.8 09/05/2019 02:30 AM     09/05/2019 02:30 AM     All Cardiac Markers in the last 24 hours:   No results found for: CPK, CK, CKMMB, CKMB, RCK3, CKMBT, CKNDX, CKND1, KESHIA, TROPT, TROIQ, JOEL, TROPT, TNIPOC, BNP, BNPP  Recent Glucose Results:   Lab Results   Component Value Date/Time     (H) 09/05/2019 02:30 AM     ABG:   No results found for: PH, PHI, PCO2, PCO2I, PO2, PO2I, HCO3, HCO3I, FIO2, FIO2I  COAGS: No results found for: APTT, PTP, INR  Liver Panel:   No results found for: ALB, CBIL, TBIL, TP, GLOB, AGRAT, SGOT, ASTPOC, ALTPOC, ALT, GPT, AP  Pancreatic Markers: No results found for: AMYLPOCT, AML, LIPPOCT, LPSE     Scheduled Medications Reviewed:  Current Facility-Administered Medications   Medication Dose Route Frequency    electrolyte-r (NORMOSOL R) infusion   IntraVENous CONTINUOUS    levoFLOXacin (LEVAQUIN) tablet 750 mg  750 mg Oral Q24H    insulin glargine (LANTUS) injection 38 Units  38 Units SubCUTAneous QHS    polyethylene glycol (MIRALAX) packet 17 g  17 g Oral DAILY    guaiFENesin ER (MUCINEX) tablet 600 mg  600 mg Oral Q12H    predniSONE (DELTASONE) tablet 40 mg  40 mg Oral DAILY WITH BREAKFAST    [START ON 9/7/2019] predniSONE (DELTASONE) tablet 30 mg  30 mg Oral DAILY WITH BREAKFAST    [START ON 9/9/2019] predniSONE (DELTASONE) tablet 20 mg  20 mg Oral DAILY WITH BREAKFAST    aspirin delayed-release tablet 81 mg  81 mg Oral DAILY    budesonide-formoterol (SYMBICORT) 160-4.5 mcg/actuation HFA inhaler 2 Puff  2 Puff Inhalation BID RT    fluticasone propionate (FLONASE) 50 mcg/actuation nasal spray 2 Spray  2 Spray Both Nostrils DAILY    lisinopril (PRINIVIL, ZESTRIL) tablet 20 mg  20 mg Oral BID    montelukast (SINGULAIR) tablet 10 mg  10 mg Oral QHS    heparin (porcine) injection 5,000 Units  5,000 Units SubCUTAneous Q8H    albuterol-ipratropium (DUO-NEB) 2.5 MG-0.5 MG/3 ML  3 mL Nebulization Q4H RT    hydroCHLOROthiazide (MICROZIDE) capsule 12.5 mg  12.5 mg Oral DAILY    pantoprazole (PROTONIX) tablet 40 mg  40 mg Oral ACB    insulin lispro (HUMALOG) injection   SubCUTAneous AC&HS       Imaging, microbiology, and EKG/Telemetry:  Results     Procedure Component Value Units Date/Time    CULTURE, RESPIRATORY/SPUTUM/BRONCH Arlet Grissom [[id number]] Collected:  08/31/19 2020    Order Status:  Completed Specimen:  Sputum Updated:  09/03/19 0923     Special Requests: NO SPECIAL REQUESTS        GRAM STAIN >25 WBC/lpf         <10 EPI/lpf               RARE GRAM POSITIVE COCCI IN PAIRS            MUCUS PRESENT        Culture result:       MODERATE NORMAL RESPIRATORY TOAN          CULTURE, URINE [[id number]] Collected:  08/30/19 2117    Order Status:  Completed Specimen:  Urine from Clean catch Updated:  09/01/19 1052     Special Requests: NO SPECIAL REQUESTS        Culture result:       [id number]  COLONIES/mL  MIXED GRAM POSITIVE TOAN, PROBABLE SKIN/GENITAL CONTAMINATION. CULTURE, BLOOD [[id number]] Collected:  08/30/19 2018    Order Status:  Completed Specimen:  Blood Updated:  09/04/19 0730     Special Requests: NO SPECIAL REQUESTS        Culture result: NO GROWTH 5 DAYS       CULTURE, BLOOD [[id number]] Collected:  08/30/19 2005    Order Status:  Completed Specimen:  Blood Updated:  09/04/19 0730     Special Requests: NO SPECIAL REQUESTS        Culture result: NO GROWTH 5 DAYS               Assessment/Plan     60 y.o. female with PMH significant for COPD on home O2, IDDM2, HTN, HFpEF, SHERRIE on CPAP, GERD, allergic rhinitis now admitted with COPD exacerbation.     COPD exacerbation patient with severe COPD on 2L of O2 at home when active + trelegy at night and PRN as well as prednisone 10mg every other day. GOLD 2017 unknown grade but group D w/ >2 hospital admissions in 1yr. No recent PFT but expect significant reduction in FEV1. Event likely exacerbated by URI as patient endorsed purulent rhinorrhea, sinus tenderness, increased sputum and cough prior to admission.  Lactic acid elevated with last value at 4. No leukocytosis or left shift. Patient is also afebrile but patient states she never gets fever even when she is very ill. However considering the patient is improving clinically and taking PO adequately, will monitor for now. ABG on 19 post Bipap usage was pH 7.36, pCO2 42, pO2 106, HCO3 234, sO2 98%  Levaquin 750mg QD - reviewed available data on up to date at higher risk for pseudomonas infection/colonization given recent hospitalization and abx use  Goal SpO2 should be between 88-92% in this patient with severe COPD  - Hold home spiriva while on scheduled duoneb treatments  - Sub home advair per pharmacy recs: c/w symbicort  - PT/OT/CM  - Incentive spirometry and acapella in place, pt educated on how to use device   AB.831/43.7/97// Lactic acid 3.2  9/3 lactic acid 2.6>3.1>3.1>2.0   BC no growth at 2 days  9/3 respiratory culture moderate normal respiratory samina   CBC WNL   BMP WNL  Plan   Scheduled duonebs Q4H  Prednisone 60mg BID for now, suggest a slow taper. Prednisone 50mg BID 9/3-,  Prednisone 40mg BID -,  Prednisone 30mg BID -,  Prednisone 20mg BID -9/10, 19 Prednisone 10mg which was her home regiment prior to admit  Home trelegy PRN and QHS   Continue home singular  Daily CBC & BMP  Down grade to tele floor today 9/3  mucinex added  NS 50ml/hr         Bacteriuria vs UTI patient with no frequency, dysuria but 3+ bacteria noted on UA. No nitrites or LE. ABX as above for COPD exacerbation. Will not treat in setting of asymptomatic bacteriuria    UC results: [id number]  COLONIES/mL  MIXED GRAM POSITIVE SAMINA, PROBABLE SKIN/GENITAL CONTAMINATION   BC no growth at 2 days  Plan   monitor for symptoms    IDDM2 moderately controleld with last A1C 8.1 2019. Taking lantus 30 units QHS, does note that her blood sugars elevate significantly with steroid therapy.  Currently BG is uncontrolled with ranges in 200-300s  Last 24hr range 120-321  Plan  - SSI QACHS  - Increased lantus to 38 units a day    - Diabetic diet   - Will monitor BS closely     HTN   24 hr range 114-150/73-80  Plan  - continue  home lisinopril 20mg BID   - continue home HCTZ 12.5 every day    Constipation   Has not had a bowel movement in 2 days  Plan  Given sonya lax  Performed Mesenteric release      HFpEF last echo with EF 66-70% 7/2018, noted prior history of diastolic dysfunction.  - Continue home Aspirin for now  Last 24hr I&O net: +1047.5ml  Plan  - Monitor for fluid overload  - I&O  - Daily weights      GERD well controlled  - Will give IV protonix for now with high dose steroid and increase risk for bleeding      Allergic rhinitis  - Continue home flonase  - Hold home cetirizine      Diet: cardiac  DVT Prophylaxis: heparin  Code Status: full  Point of Contact: Zofia Gil (daughter) [phone]     Disposition and anticipated LOS: >2mnt    Waleska Hand DO  [facility] Resident  [facility]  09/05/19 6:42 AM

## 2019-09-05 NOTE — DISCHARGE INSTRUCTIONS
Patient Education        Chronic Obstructive Pulmonary Disease (COPD): Care Instructions  Your Care Instructions    Chronic obstructive pulmonary disease (COPD) is a general term for a group of lung diseases, including emphysema and chronic bronchitis. People with COPD have decreased airflow in and out of the lungs, which makes it hard to breathe. The airways also can get clogged with thick mucus. Cigarette smoking is a major cause of COPD. Although there is no cure for COPD, you can slow its progress. Following your treatment plan and taking care of yourself can help you feel better and live longer. Follow-up care is a key part of your treatment and safety. Be sure to make and go to all appointments, and call your doctor if you are having problems. It's also a good idea to know your test results and keep a list of the medicines you take. How can you care for yourself at home?   Staying healthy    · Do not smoke. This is the most important step you can take to prevent more damage to your lungs. If you need help quitting, talk to your doctor about stop-smoking programs and medicines. These can increase your chances of quitting for good.     · Avoid colds and flu. Get a pneumococcal vaccine shot. If you have had one before, ask your doctor whether you need a second dose. Get the flu vaccine every fall. If you must be around people with colds or the flu, wash your hands often.     · Avoid secondhand smoke, air pollution, and high altitudes. Also avoid cold, dry air and hot, humid air. Stay at home with your windows closed when air pollution is bad.    Medicines and oxygen therapy    · Take your medicines exactly as prescribed. Call your doctor if you think you are having a problem with your medicine.     · You may be taking medicines such as:  ? Bronchodilators. These help open your airways and make breathing easier. Bronchodilators are either short-acting (work for 6 to 9 hours) or long-acting (work for 24 hours). You inhale most bronchodilators, so they start to act quickly. Always carry your quick-relief inhaler with you in case you need it while you are away from home. ? Corticosteroids (prednisone, budesonide). These reduce airway inflammation. They come in pill or inhaled form. You must take these medicines every day for them to work well.     · A spacer may help you get more inhaled medicine to your lungs. Ask your doctor or pharmacist if a spacer is right for you. If it is, ask how to use it properly.     · Do not take any vitamins, over-the-counter medicine, or herbal products without talking to your doctor first.     · If your doctor prescribed antibiotics, take them as directed. Do not stop taking them just because you feel better. You need to take the full course of antibiotics.     · Oxygen therapy boosts the amount of oxygen in your blood and helps you breathe easier. Use the flow rate your doctor has recommended, and do not change it without talking to your doctor first.   Activity    · Get regular exercise. Walking is an easy way to get exercise. Start out slowly, and walk a little more each day.     · Pay attention to your breathing. You are exercising too hard if you cannot talk while you are exercising.     · Take short rest breaks when doing household chores and other activities.     · Learn breathing methods--such as breathing through pursed lips--to help you become less short of breath.     · If your doctor has not set you up with a pulmonary rehabilitation program, talk to him or her about whether rehab is right for you. Rehab includes exercise programs, education about your disease and how to manage it, help with diet and other changes, and emotional support. Diet    · Eat regular, healthy meals. Use bronchodilators about 1 hour before you eat to make it easier to eat. Eat several small meals instead of three large ones.  Drink beverages at the end of the meal. Avoid foods that are hard to chew.     · Eat foods that contain protein so that you do not lose muscle mass.     · Talk with your doctor if you gain too much weight or if you lose weight without trying.    Mental health    · Talk to your family, friends, or a therapist about your feelings. It is normal to feel frightened, angry, hopeless, helpless, and even guilty. Talking openly about bad feelings can help you cope. If these feelings last, talk to your doctor. When should you call for help? Call 911 anytime you think you may need emergency care. For example, call if:    · You have severe trouble breathing.    Call your doctor now or seek immediate medical care if:    · You have new or worse trouble breathing.     · You cough up blood.     · You have a fever.    Watch closely for changes in your health, and be sure to contact your doctor if:    · You cough more deeply or more often, especially if you notice more mucus or a change in the color of your mucus.     · You have new or worse swelling in your legs or belly.     · You are not getting better as expected. Where can you learn more? Go to http://etelvina-olivier.info/. Odalys Martinez in the search box to learn more about \"Chronic Obstructive Pulmonary Disease (COPD): Care Instructions. \"  Current as of: September 5, 2018  Content Version: 12.1  © 3599-3669 [employer]. Care instructions adapted under license by [employer] (which disclaims liability or warranty for this information). If you have questions about a medical condition or this instruction, always ask your healthcare professional. [place] [address] any warranty or liability for your use of this information. Patient Education        COPD Exacerbation Plan: Care Instructions  Your Care Instructions    If you have chronic obstructive pulmonary disease (COPD), your usual shortness of breath could suddenly get worse. You may start coughing more and have more mucus.  This flare-up is called a COPD exacerbation (say \"fs-KOF-up-BAY-shun\"). A lung infection or air pollution could set off an exacerbation. Sometimes it can happen after a quick change in temperature or being around chemicals. Work with your doctor to make a plan for dealing with an exacerbation. You can better manage it if you plan ahead. Follow-up care is a key part of your treatment and safety. Be sure to make and go to all appointments, and call your doctor if you are having problems. It's also a good idea to know your test results and keep a list of the medicines you take. How can you care for yourself at home? During an exacerbation  · Do not panic if you start to have one. Quick treatment at home may help you prevent serious breathing problems. If you have a COPD exacerbation plan that you developed with your doctor, follow it. · Take your medicines exactly as your doctor tells you.  ? Use your inhaler as directed by your doctor. If your symptoms do not get better after you use your medicine, have someone take you to the emergency room. Call an ambulance if necessary. ? With inhaled medicines, a spacer or a nebulizer may help you get more medicine to your lungs. Ask your doctor or pharmacist how to use them properly. Practice using the spacer in front of a mirror before you have an exacerbation. This may help you get the medicine into your lungs quickly. ? If your doctor has given you steroid pills, take them as directed. ? Your doctor may have given you a prescription for antibiotics, which you can fill if you need it. ? Talk to your doctor if you have any problems with your medicine. And call your doctor if you have to use your antibiotic or steroid pills. Preventing an exacerbation  · Do not smoke. This is the most important step you can take to prevent more damage to your lungs and prevent problems. If you already smoke, it is never too late to stop.  If you need help quitting, talk to your doctor about stop-smoking programs and medicines. These can increase your chances of quitting for good. · Take your daily medicines as prescribed. · Avoid colds and flu. ? Get a pneumococcal vaccine. ? Get a flu vaccine each year, as soon as it is available. Ask those you live or work with to do the same, so they will not get the flu and infect you. ? Try to stay away from people with colds or the flu. ? Wash your hands often. · Avoid secondhand smoke; air pollution; cold, dry air; hot, humid air; and high altitudes. Stay at home with your windows closed when air pollution is bad. · Learn breathing techniques for COPD, such as breathing through pursed lips. These techniques can help you breathe easier during an exacerbation. When should you call for help? Call 911 anytime you think you may need emergency care. For example, call if:    · You have severe trouble breathing.     · You have severe chest pain.    Call your doctor now or seek immediate medical care if:    · You have new or worse shortness of breath.     · You develop new chest pain.     · You are coughing more deeply or more often, especially if you notice more mucus or a change in the color of your mucus.     · You cough up blood.     · You have new or increased swelling in your legs or belly.     · You have a fever.    Watch closely for changes in your health, and be sure to contact your doctor if:    · You need to use your antibiotic or steroid pills.     · Your symptoms are getting worse. Where can you learn more? Go to http://etelvina-olivier.info/. Enter F461 in the search box to learn more about \"COPD Exacerbation Plan: Care Instructions. \"  Current as of: September 5, 2018  Content Version: 12.1  © 2962-7236 [employer]. Care instructions adapted under license by [facility] (which disclaims liability or warranty for this information).  If you have questions about a medical condition or this instruction, always ask your healthcare professional. [place] [address] any warranty or liability for your use of this information.

## 2019-09-05 NOTE — PROGRESS NOTES
Discharge planning    Discharge order noted for today. Met with patient and agreeable to the transition plan today. Transport has been arranged daughter. Daughter will bring patient's oxygen tank from home at discharge. Updated bedside RN, Rylan Vera,  to the transition plan.   Discharge information has been documented on the AVS.     ANA M Webber, RN  Pager # [phone]  Care Manager

## 2019-09-05 NOTE — PROGRESS NOTES
Problem: Pain  Goal: *Control of Pain  Outcome: Progressing Towards Goal     Problem: Patient Education: Go to Patient Education Activity  Goal: Patient/Family Education  Outcome: Progressing Towards Goal     Problem: Falls - Risk of  Goal: *Absence of Falls  Description  Document Rambo Paulinos Fall Risk and appropriate interventions in the flowsheet. Outcome: Progressing Towards Goal  Note:   Fall Risk Interventions:  Mobility Interventions: Patient to call before getting OOB         Medication Interventions: Patient to call before getting OOB    Elimination Interventions: Bed/chair exit alarm, Call light in reach    History of Falls Interventions: Bed/chair exit alarm, Investigate reason for fall, Room close to nurse's station         Problem: Pressure Injury - Risk of  Goal: *Prevention of pressure injury  Description  Document Viktor Scale and appropriate interventions in the flowsheet.   Outcome: Progressing Towards Goal  Note:   Pressure Injury Interventions:  Sensory Interventions: Assess changes in LOC    Moisture Interventions: Absorbent underpads    Activity Interventions: Increase time out of bed    Mobility Interventions: Pressure redistribution bed/mattress (bed type)    Nutrition Interventions: Document food/fluid/supplement intake    Friction and Shear Interventions: Apply protective barrier, creams and emollients

## 2019-09-05 NOTE — PROGRESS NOTES
1100: Received report from [facility].    1130: Administered scheduled meds. Assessment completed. Physicians at bedside. 1300: Patient in bed eating. 1441: Paged [place] [phone] at patient D/C orders, physician stated that orders will be in shortly. 1545: Notified patient about D/C plans. Patient in bed resting. NAD. 1730: Fluids stopped. IV taken out. Adminsietred scheduled meds. D/C instructions discussed with patient. 1800: Patient D/C via wheelchair with sister.

## 2019-09-05 NOTE — PROGRESS NOTES
Problem: Pain  Goal: *Control of Pain  9/5/2019 1606 by Iris Crisostomo RN  Outcome: Resolved/Met  9/5/2019 1342 by Iris Crisostomo RN  Outcome: Progressing Towards Goal     Problem: Patient Education: Go to Patient Education Activity  Goal: Patient/Family Education  9/5/2019 1606 by Iris Crisostomo RN  Outcome: Resolved/Met  9/5/2019 1342 by Iris Crisostomo RN  Outcome: Progressing Towards Goal     Problem: Falls - Risk of  Goal: *Absence of Falls  Description  Document Arlet Evans Fall Risk and appropriate interventions in the flowsheet.   9/5/2019 1606 by Iris Crisostomo RN  Outcome: Resolved/Met  Note:   Fall Risk Interventions:  Mobility Interventions: Patient to call before getting OOB         Medication Interventions: Patient to call before getting OOB    Elimination Interventions: Bed/chair exit alarm, Call light in reach    History of Falls Interventions: Bed/chair exit alarm, Investigate reason for fall, Room close to nurse's station      9/5/2019 1342 by Iris Crisostomo RN  Outcome: Progressing Towards Goal  Note:   Fall Risk Interventions:  Mobility Interventions: Patient to call before getting OOB         Medication Interventions: Patient to call before getting OOB    Elimination Interventions: Bed/chair exit alarm, Call light in reach    History of Falls Interventions: Bed/chair exit alarm, Investigate reason for fall, Room close to nurse's station         Problem: Patient Education: Go to Patient Education Activity  Goal: Patient/Family Education  9/5/2019 1606 by Iris Crisostomo RN  Outcome: Resolved/Met  9/5/2019 1342 by Iris Crisostomo RN  Outcome: Progressing Towards Goal     Problem: Chronic Obstructive Pulmonary Disease (COPD)  Goal: *Oxygen saturation during activity within specified parameters  9/5/2019 1606 by Iris Crisostomo RN  Outcome: Resolved/Met  9/5/2019 1342 by Iris Crisostomo RN  Outcome: Progressing Towards Goal  Goal: *Able to remain out of bed as prescribed  9/5/2019 1606 by Moira Short Olya Monte RN  Outcome: Resolved/Met  9/5/2019 1342 by Christiana Crigler, RN  Outcome: Progressing Towards Goal  Goal: *Absence of hypoxia  9/5/2019 1606 by Christiana Crigler, RN  Outcome: Resolved/Met  9/5/2019 1342 by Christiana Crigler, RN  Outcome: Progressing Towards Goal  Goal: *Optimize nutritional status  9/5/2019 1606 by Christiana Crigler, RN  Outcome: Resolved/Met  9/5/2019 1342 by Christiana Crigler, RN  Outcome: Progressing Towards Goal     Problem: Patient Education: Go to Patient Education Activity  Goal: Patient/Family Education  9/5/2019 1606 by Christiana Crigler, RN  Outcome: Resolved/Met  9/5/2019 1342 by Christiana Crigler, RN  Outcome: Progressing Towards Goal     Problem: Activity Intolerance  Goal: *Oxygen saturation during activity within specified parameters  9/5/2019 1606 by Christiana Crigler, RN  Outcome: Resolved/Met  9/5/2019 1342 by Christiana Crigler, RN  Outcome: Progressing Towards Goal  Goal: *Able to remain out of bed as prescribed  9/5/2019 1606 by Christiana Crigler, RN  Outcome: Resolved/Met  9/5/2019 1342 by Christiana Crigler, RN  Outcome: Progressing Towards Goal     Problem: Pressure Injury - Risk of  Goal: *Prevention of pressure injury  Description  Document Viktor Scale and appropriate interventions in the flowsheet.   9/5/2019 1606 by Christiana Crigler, RN  Outcome: Resolved/Met  Note:   Pressure Injury Interventions:  Sensory Interventions: Assess changes in LOC    Moisture Interventions: Absorbent underpads    Activity Interventions: Increase time out of bed    Mobility Interventions: Pressure redistribution bed/mattress (bed type)    Nutrition Interventions: Document food/fluid/supplement intake    Friction and Shear Interventions: Apply protective barrier, creams and emollients             9/5/2019 1342 by Christiana Crigler, RN  Outcome: Progressing Towards Goal  Note:   Pressure Injury Interventions:  Sensory Interventions: Assess changes in LOC    Moisture Interventions: Absorbent underpads    Activity Interventions: Increase time out of bed    Mobility Interventions: Pressure redistribution bed/mattress (bed type)    Nutrition Interventions: Document food/fluid/supplement intake    Friction and Shear Interventions: Apply protective barrier, creams and emollients                Problem: Patient Education: Go to Patient Education Activity  Goal: Patient/Family Education  9/5/2019 1606 by Iris Crisostomo RN  Outcome: Resolved/Met  9/5/2019 1342 by Iris Crisostomo RN  Outcome: Progressing Towards Goal

## 2019-09-05 NOTE — PROGRESS NOTES
[address] [place] [address]  ANTIMICROBIAL STEWARDSHIP TEAM  PRESCRIBER COMMUNICATION FORM      We have briefly reviewed the antimicrobials  currently prescribed for your patient along with  the clinical indications and would like to Make the following recommendations:    DISCONTINUE ANTIBIOTICS   levofloxacin      Rationale:    (  )  No evidence of bacterial infection    (  )  Microbiology report does not support use    (  )  Narrowing broad-spectrum empiric coverage    (  )  Therapeutic duplication: overlapping antimicrobial spectrum    (  )  Clinical situation dictates change    (x)  Prolonged duration of therapy not needed    (  )  Allergy/toxicity/drug interaction present    (  ) More clinically/cost effective therapy available  ADD ANTIBIOTICS  Rationale:        (  ) Microbiology report supports use    (  ) Expanded coverage needed: gm positive /negative / anaerobic /                               atypical    ( ) More clinicaly/cost effective therapy than current regimen    ( ) Needed for synergy    ( ) Double coverage needed    ( ) Less toxic therapy    ( ) Antifungal therapy needed  ADDITIONAL SUGGESTIONS    ( ) continue current therapy with the following change    ( ) additional laboratory testing    ( ) consider infectious disease consultation    ( ) consider outpatient IV therapy    ( ) other    COMMENTS: patient has completed adequate antibiotic therapy for bronchitis. thanks       This communication is not to be considered a consultation. You are under no obligation to follow these suggestions. A physician-patient relationship has not been established between the physician Completing this form and your patient. If a thorough analysis of the case is desired, please request an ID consultation.

## 2019-09-20 ENCOUNTER — HOSPITAL ENCOUNTER (EMERGENCY)
Age: 59
Discharge: HOME | End: 2019-09-21
Attending: EMERGENCY MEDICINE
Payer: MEDICARE

## 2019-09-20 ENCOUNTER — APPOINTMENT (OUTPATIENT)
Dept: GENERAL RADIOLOGY | Age: 59
End: 2019-09-20
Attending: NURSE PRACTITIONER
Payer: MEDICARE

## 2019-09-20 DIAGNOSIS — J44.1 COPD EXACERBATION (HCC): Primary | ICD-10-CM

## 2019-09-20 LAB
ALBUMIN SERPL-MCNC: 3.6 G/DL (ref 3.4–5)
ALBUMIN/GLOB SERPL: 1 {RATIO} (ref 0.8–1.7)
ALP SERPL-CCNC: 86 U/L (ref 45–117)
ALT SERPL-CCNC: 51 U/L (ref 13–56)
ANION GAP SERPL CALC-SCNC: 4 MMOL/L (ref 3–18)
AST SERPL-CCNC: 22 U/L (ref 10–38)
BASOPHILS # BLD: 0 K/UL (ref 0–0.1)
BASOPHILS NFR BLD: 0 % (ref 0–2)
BILIRUB SERPL-MCNC: 0.5 MG/DL (ref 0.2–1)
BNP SERPL-MCNC: 72 PG/ML (ref 0–900)
BUN SERPL-MCNC: 10 MG/DL (ref 7–18)
BUN/CREAT SERPL: 11 (ref 12–20)
CALCIUM SERPL-MCNC: 9.3 MG/DL (ref 8.5–10.1)
CHLORIDE SERPL-SCNC: 105 MMOL/L (ref 100–111)
CK MB CFR SERPL CALC: 1.4 % (ref 0–4)
CK MB SERPL-MCNC: 1.3 NG/ML (ref 5–25)
CK SERPL-CCNC: 90 U/L (ref 26–192)
CO2 SERPL-SCNC: 32 MMOL/L (ref 21–32)
CREAT SERPL-MCNC: 0.88 MG/DL (ref 0.6–1.3)
DIFFERENTIAL METHOD BLD: ABNORMAL
EOSINOPHIL # BLD: 0.2 K/UL (ref 0–0.4)
EOSINOPHIL NFR BLD: 3 % (ref 0–5)
ERYTHROCYTE [DISTWIDTH] IN BLOOD BY AUTOMATED COUNT: 13.7 % (ref 11.6–14.5)
GLOBULIN SER CALC-MCNC: 3.6 G/DL (ref 2–4)
GLUCOSE SERPL-MCNC: 184 MG/DL (ref 74–99)
HCT VFR BLD AUTO: 37.5 % (ref 35–45)
HGB BLD-MCNC: 13.1 G/DL (ref 12–16)
INR PPP: 0.9 (ref 0.8–1.2)
LYMPHOCYTES # BLD: 2.2 K/UL (ref 0.9–3.6)
LYMPHOCYTES NFR BLD: 39 % (ref 21–52)
MAGNESIUM SERPL-MCNC: 1.5 MG/DL (ref 1.6–2.6)
MCH RBC QN AUTO: 30.3 PG (ref 24–34)
MCHC RBC AUTO-ENTMCNC: 34.9 G/DL (ref 31–37)
MCV RBC AUTO: 86.8 FL (ref 74–97)
MONOCYTES # BLD: 0.4 K/UL (ref 0.05–1.2)
MONOCYTES NFR BLD: 8 % (ref 3–10)
NEUTS SEG # BLD: 2.8 K/UL (ref 1.8–8)
NEUTS SEG NFR BLD: 50 % (ref 40–73)
PLATELET # BLD AUTO: 256 K/UL (ref 135–420)
PMV BLD AUTO: 8.9 FL (ref 9.2–11.8)
POTASSIUM SERPL-SCNC: 3.6 MMOL/L (ref 3.5–5.5)
PROT SERPL-MCNC: 7.2 G/DL (ref 6.4–8.2)
PROTHROMBIN TIME: 11.9 SEC (ref 11.5–15.2)
RBC # BLD AUTO: 4.32 M/UL (ref 4.2–5.3)
SODIUM SERPL-SCNC: 141 MMOL/L (ref 136–145)
TROPONIN I SERPL-MCNC: <0.02 NG/ML (ref 0–0.04)
WBC # BLD AUTO: 5.5 K/UL (ref 4.6–13.2)

## 2019-09-20 PROCEDURE — 80053 COMPREHEN METABOLIC PANEL: CPT

## 2019-09-20 PROCEDURE — 83880 ASSAY OF NATRIURETIC PEPTIDE: CPT

## 2019-09-20 PROCEDURE — 74011000250 HC RX REV CODE- 250: Performed by: NURSE PRACTITIONER

## 2019-09-20 PROCEDURE — 74011636637 HC RX REV CODE- 636/637: Performed by: EMERGENCY MEDICINE

## 2019-09-20 PROCEDURE — 85025 COMPLETE CBC W/AUTO DIFF WBC: CPT

## 2019-09-20 PROCEDURE — 94640 AIRWAY INHALATION TREATMENT: CPT

## 2019-09-20 PROCEDURE — 85610 PROTHROMBIN TIME: CPT

## 2019-09-20 PROCEDURE — 82550 ASSAY OF CK (CPK): CPT

## 2019-09-20 PROCEDURE — A9270 NON-COVERED ITEM OR SERVICE: HCPCS | Performed by: EMERGENCY MEDICINE

## 2019-09-20 PROCEDURE — 93005 ELECTROCARDIOGRAM TRACING: CPT

## 2019-09-20 PROCEDURE — 83735 ASSAY OF MAGNESIUM: CPT

## 2019-09-20 PROCEDURE — 71046 X-RAY EXAM CHEST 2 VIEWS: CPT

## 2019-09-20 PROCEDURE — 99284 EMERGENCY DEPT VISIT MOD MDM: CPT

## 2019-09-20 RX ORDER — IPRATROPIUM BROMIDE AND ALBUTEROL SULFATE 2.5; .5 MG/3ML; MG/3ML
3 SOLUTION RESPIRATORY (INHALATION) ONCE
Status: COMPLETED | OUTPATIENT
Start: 2019-09-20 | End: 2019-09-20

## 2019-09-20 RX ORDER — PREDNISONE 20 MG/1
60 TABLET ORAL
Status: DISCONTINUED | OUTPATIENT
Start: 2019-09-20 | End: 2019-09-20

## 2019-09-20 RX ORDER — IPRATROPIUM BROMIDE AND ALBUTEROL SULFATE 2.5; .5 MG/3ML; MG/3ML
3 SOLUTION RESPIRATORY (INHALATION)
Status: DISPENSED | OUTPATIENT
Start: 2019-09-20 | End: 2019-09-20

## 2019-09-20 RX ORDER — PREDNISONE 20 MG/1
20 TABLET ORAL
Status: COMPLETED | OUTPATIENT
Start: 2019-09-20 | End: 2019-09-20

## 2019-09-20 RX ORDER — BUDESONIDE 0.5 MG/2ML
500 INHALANT ORAL
Status: COMPLETED | OUTPATIENT
Start: 2019-09-20 | End: 2019-09-20

## 2019-09-20 RX ADMIN — IPRATROPIUM BROMIDE AND ALBUTEROL SULFATE 3 ML: .5; 3 SOLUTION RESPIRATORY (INHALATION) at 23:34

## 2019-09-20 RX ADMIN — PREDNISONE 20 MG: 20 TABLET ORAL at 20:12

## 2019-09-20 RX ADMIN — BUDESONIDE 500 MCG: 0.5 INHALANT RESPIRATORY (INHALATION) at 23:34

## 2019-09-20 RX ADMIN — IPRATROPIUM BROMIDE AND ALBUTEROL SULFATE 3 ML: .5; 3 SOLUTION RESPIRATORY (INHALATION) at 19:58

## 2019-09-21 VITALS
HEIGHT: 63 IN | DIASTOLIC BLOOD PRESSURE: 84 MMHG | RESPIRATION RATE: 22 BRPM | WEIGHT: 250 LBS | TEMPERATURE: 98.2 F | SYSTOLIC BLOOD PRESSURE: 153 MMHG | OXYGEN SATURATION: 97 % | BODY MASS INDEX: 44.3 KG/M2 | HEART RATE: 87 BPM

## 2019-09-21 LAB
ARTERIAL PATENCY WRIST A: YES
ATRIAL RATE: 86 BPM
BASE EXCESS BLD CALC-SCNC: 1 MMOL/L
BDY SITE: ABNORMAL
CALCULATED P AXIS, ECG09: 40 DEGREES
CALCULATED R AXIS, ECG10: 23 DEGREES
CALCULATED T AXIS, ECG11: 46 DEGREES
DIAGNOSIS, 93000: NORMAL
GAS FLOW.O2 O2 DELIVERY SYS: ABNORMAL L/MIN
GAS FLOW.O2 SETTING OXYMISER: 2 L/M
HCO3 BLD-SCNC: 26.7 MMOL/L (ref 22–26)
P-R INTERVAL, ECG05: 142 MS
PCO2 BLD: 44 MMHG (ref 35–45)
PH BLD: 7.39 [PH] (ref 7.35–7.45)
PO2 BLD: 99 MMHG (ref 80–100)
Q-T INTERVAL, ECG07: 382 MS
QRS DURATION, ECG06: 90 MS
QTC CALCULATION (BEZET), ECG08: 457 MS
SAO2 % BLD: 98 % (ref 92–97)
SERVICE CMNT-IMP: ABNORMAL
SPECIMEN TYPE: ABNORMAL
TOTAL RESP. RATE, ITRR: 20
VENTRICULAR RATE, ECG03: 86 BPM

## 2019-09-21 PROCEDURE — 82803 BLOOD GASES ANY COMBINATION: CPT

## 2019-09-21 PROCEDURE — 36600 WITHDRAWAL OF ARTERIAL BLOOD: CPT

## 2019-09-21 NOTE — DISCHARGE INSTRUCTIONS
Patient Education        Chronic Obstructive Pulmonary Disease (COPD): Care Instructions  Your Care Instructions    Chronic obstructive pulmonary disease (COPD) is a general term for a group of lung diseases, including emphysema and chronic bronchitis. People with COPD have decreased airflow in and out of the lungs, which makes it hard to breathe. The airways also can get clogged with thick mucus. Cigarette smoking is a major cause of COPD. Although there is no cure for COPD, you can slow its progress. Following your treatment plan and taking care of yourself can help you feel better and live longer. Follow-up care is a key part of your treatment and safety. Be sure to make and go to all appointments, and call your doctor if you are having problems. It's also a good idea to know your test results and keep a list of the medicines you take. How can you care for yourself at home?   Staying healthy    · Do not smoke. This is the most important step you can take to prevent more damage to your lungs. If you need help quitting, talk to your doctor about stop-smoking programs and medicines. These can increase your chances of quitting for good.     · Avoid colds and flu. Get a pneumococcal vaccine shot. If you have had one before, ask your doctor whether you need a second dose. Get the flu vaccine every fall. If you must be around people with colds or the flu, wash your hands often.     · Avoid secondhand smoke, air pollution, and high altitudes. Also avoid cold, dry air and hot, humid air. Stay at home with your windows closed when air pollution is bad.    Medicines and oxygen therapy    · Take your medicines exactly as prescribed. Call your doctor if you think you are having a problem with your medicine.     · You may be taking medicines such as:  ? Bronchodilators. These help open your airways and make breathing easier. Bronchodilators are either short-acting (work for 6 to 9 hours) or long-acting (work for 24 hours). You inhale most bronchodilators, so they start to act quickly. Always carry your quick-relief inhaler with you in case you need it while you are away from home. ? Corticosteroids (prednisone, budesonide). These reduce airway inflammation. They come in pill or inhaled form. You must take these medicines every day for them to work well.     · A spacer may help you get more inhaled medicine to your lungs. Ask your doctor or pharmacist if a spacer is right for you. If it is, ask how to use it properly.     · Do not take any vitamins, over-the-counter medicine, or herbal products without talking to your doctor first.     · If your doctor prescribed antibiotics, take them as directed. Do not stop taking them just because you feel better. You need to take the full course of antibiotics.     · Oxygen therapy boosts the amount of oxygen in your blood and helps you breathe easier. Use the flow rate your doctor has recommended, and do not change it without talking to your doctor first.   Activity    · Get regular exercise. Walking is an easy way to get exercise. Start out slowly, and walk a little more each day.     · Pay attention to your breathing. You are exercising too hard if you cannot talk while you are exercising.     · Take short rest breaks when doing household chores and other activities.     · Learn breathing methods--such as breathing through pursed lips--to help you become less short of breath.     · If your doctor has not set you up with a pulmonary rehabilitation program, talk to him or her about whether rehab is right for you. Rehab includes exercise programs, education about your disease and how to manage it, help with diet and other changes, and emotional support. Diet    · Eat regular, healthy meals. Use bronchodilators about 1 hour before you eat to make it easier to eat. Eat several small meals instead of three large ones.  Drink beverages at the end of the meal. Avoid foods that are hard to chew.     · Eat foods that contain protein so that you do not lose muscle mass.     · Talk with your doctor if you gain too much weight or if you lose weight without trying.    Mental health    · Talk to your family, friends, or a therapist about your feelings. It is normal to feel frightened, angry, hopeless, helpless, and even guilty. Talking openly about bad feelings can help you cope. If these feelings last, talk to your doctor. When should you call for help? Call 911 anytime you think you may need emergency care. For example, call if:    · You have severe trouble breathing.    Call your doctor now or seek immediate medical care if:    · You have new or worse trouble breathing.     · You cough up blood.     · You have a fever.    Watch closely for changes in your health, and be sure to contact your doctor if:    · You cough more deeply or more often, especially if you notice more mucus or a change in the color of your mucus.     · You have new or worse swelling in your legs or belly.     · You are not getting better as expected. Where can you learn more? Go to http://etelvina-olivier.info/. Nataliia Bahena in the search box to learn more about \"Chronic Obstructive Pulmonary Disease (COPD): Care Instructions. \"  Current as of: June 9, 2019  Content Version: 12.2  © 8813-9920 "[employer]. Care instructions adapted under license by [employer] (which disclaims liability or warranty for this information). If you have questions about a medical condition or this instruction, always ask your healthcare professional. [place] [address] any warranty or liability for your use of this information.

## 2019-09-21 NOTE — ED PROVIDER NOTES
DR. [facility]  Emergency Department Treatment Report        8:45 PM Mary Adan is a 60 y.o. female with a history of COPD who presents to ED with wheezing and mild shortness of breath. Patient states that she has had increased shortness of breath for the last 2 weeks. She uses a nebulizer at home and she is on home O2 at 2 L. No chest pain or no fever has been reported. No leg swelling and no leg pain has been reported either. Patient has a BiPAP at home she has a nebulizer she is on Cipro and prednisone per her primary care she has been evaluated by her primary care recently no chest pain at this time    No other complaints, associated symptoms or modifying factors at this time. PCP: Tricia Mcghee MD    The history is provided by the patient. No  was used. COPD   This is a chronic problem. The current episode started more than 1 week ago. The problem occurs constantly. The problem has not changed since onset. Pertinent negatives include no chest pain, no abdominal pain, no headaches and no shortness of breath. The symptoms are aggravated by walking.         Past Medical History:   Diagnosis Date    Asthma     Chronic lung disease     COPD     Cystocele, midline     Diabetes mellitus ([facility] [address]     GERD (gastroesophageal reflux disease)     Hidradenitis suppurativa     Hyperlipidemia     Hypertension     SHERRIE on CPAP     CPAP    Stress incontinence        Past Surgical History:   Procedure Laterality Date    BREAST SURGERY PROCEDURE UNLISTED      Right breast benign tumor removal    HX APPENDECTOMY      HX CHOLECYSTECTOMY      HX DILATION AND CURETTAGE      Dysfunctional uterine bleeding, thought 2/2 fibroids    HX TUBAL LIGATION           Family History:   Problem Relation Age of Onset    Hypertension Mother     Stroke Mother     Breast Cancer Mother         Bilateral mastectomies    Cancer Mother         ovarian and breast    Heart Failure Mother     Heart Attack Father         2011    Heart Surgery Father         CABG    Heart Failure Father     COPD Sister         Heavy smoker    Cancer Sister         ovarian    Heart Failure Sister     Lung Disease Sister     Asthma Child     Cancer Maternal Aunt         pancreatic    Cancer Maternal Grandfather         stomach       Social History     Socioeconomic History    Marital status: LEGALLY      Spouse name: Not on file    Number of children: Not on file    Years of education: Not on file    Highest education level: Not on file   Occupational History    Not on file   Social Needs    Financial resource strain: Not on file    Food insecurity:     Worry: Not on file     Inability: Not on file    Transportation needs:     Medical: Not on file     Non-medical: Not on file   Tobacco Use    Smoking status: Former Smoker     Packs/day: 1.00     Years: 30.     Pack years: 30.00     Types: Cigarettes     Start date: 1966     Last attempt to quit: 2006     Years since quittin.0    Smokeless tobacco: Never Used    Tobacco comment: 1-1.5 packs per day   Substance and Sexual Activity    Alcohol use: No    Drug use: No    Sexual activity: Never   Lifestyle    Physical activity:     Days per week: Not on file     Minutes per session: Not on file    Stress: Not on file   Relationships    Social connections:     Talks on phone: Not on file     Gets together: Not on file     Attends [employer] service: Not on file     Active member of club or organization: Not on file     Attends meetings of clubs or organizations: Not on file     Relationship status: Not on file    Intimate partner violence:     Fear of current or ex partner: Not on file     Emotionally abused: Not on file     Physically abused: Not on file     Forced sexual activity: Not on file   Other Topics Concern    Not on file   Social History Narrative    Not on file         ALLERGIES: Ancef [cefazolin];  Contrast agent [iodine]; Fish containing products; Metformin; and Codeine    Review of Systems   Constitutional: Negative for fever. Respiratory: Positive for chest tightness and wheezing. Negative for cough, shortness of breath and stridor. Cardiovascular: Negative for chest pain, palpitations and leg swelling. Gastrointestinal: Negative for abdominal pain. Neurological: Negative for dizziness and headaches. All other systems reviewed and are negative. Vitals:    09/21/19 0045 09/21/19 0100 09/21/19 0115 09/21/19 0129   BP:    153/84   Pulse:    87   Resp:       Temp:    98.2 °F (36.8 °C)   SpO2: 99% 96% 97% 97%   Weight:       Height:                Physical Exam   Constitutional: She is oriented to person, place, and time. She appears well-developed and well-nourished. No distress. HENT:   Head: Normocephalic and atraumatic. Eyes: Pupils are equal, round, and reactive to light. Conjunctivae and EOM are normal.   Neck: Normal range of motion. Neck supple. Cardiovascular: Normal rate and regular rhythm. Pulmonary/Chest: Effort normal. No tachypnea. No respiratory distress. She has wheezes. Diffuse scattered wheeze   Abdominal: Soft. Bowel sounds are normal. There is no tenderness. Musculoskeletal: Normal range of motion. Neurological: She is alert and oriented to person, place, and time. She has normal reflexes. Skin: Skin is warm and dry. Capillary refill takes less than 2 seconds. Psychiatric: She has a normal mood and affect. Her behavior is normal. Judgment and thought content normal.   Nursing note and vitals reviewed. MDM  Number of Diagnoses or Management Options  COPD exacerbation [facility]):   Diagnosis management comments: Patient on home O2 no respiratory distress noted at this time I will order prednisone breathing treatment and evaluate.   I suspect COPD exacerbation    Patient is improved after medication in the emergency department ABG was done and looks within normal limits for her baseline COPD. I had discussed admission with patient and patient states she is feeling better. It is reasonable for patient to go home at this time she is on Cipro at this time for upper respiratory infection suspected there is no pneumonia noted on x-ray of chest.  ABG is within normal limits for her. She is on steroids at this time from her primary care. She has a nebulizer at home she has a BiPAP at home I think it is reasonable at this time to discharge patient to home to continue her regular medication and treatment. She will follow-up with her primary care in a few days if she is worse to return to the emergency department.        Amount and/or Complexity of Data Reviewed  Clinical lab tests: ordered and reviewed  Tests in the radiology section of CPT®: ordered and reviewed  Review and summarize past medical records: yes  Independent visualization of images, tracings, or specimens: yes    Risk of Complications, Morbidity, and/or Mortality  Presenting problems: moderate  Diagnostic procedures: moderate  Management options: moderate    Patient Progress  Patient progress: improved         Procedures            Vitals:  Patient Vitals for the past 12 hrs:   Temp Pulse Resp BP SpO2   09/21/19 0129 98.2 °F (36.8 °C) 87 -- 153/84 97 %   09/21/19 0115 -- -- -- -- 97 %   09/21/19 0100 -- -- -- -- 96 %   09/21/19 0045 -- -- -- -- 99 %   09/21/19 0030 -- -- -- -- 97 %   09/21/19 0015 -- -- -- -- 96 %   09/20/19 2345 -- -- -- -- 96 %   09/20/19 2330 -- -- -- -- 96 %   09/20/19 2315 -- -- -- -- 97 %   09/20/19 2300 -- -- -- -- 97 %   09/20/19 2245 -- -- -- -- 98 %   09/20/19 2230 -- -- -- -- 97 %   09/20/19 2215 -- -- -- -- 98 %   09/20/19 2200 -- -- -- -- 99 %   09/20/19 2145 -- -- -- -- 99 %   09/20/19 2130 -- -- -- -- 100 %   09/20/19 2115 -- -- -- -- 100 %   09/20/19 2100 -- -- -- -- 95 %   09/20/19 2045 -- -- -- -- 96 %   09/20/19 2030 -- -- -- -- 97 %   09/20/19 1809 97.9 °F (36.6 °C) 100 22 (!) 179/93 98 %       Medications ordered:   Medications   albuterol-ipratropium (DUO-NEB) 2.5 MG-0.5 MG/3 ML (3 mL Nebulization Given 9/20/19 2334)   albuterol-ipratropium (DUO-NEB) 2.5 MG-0.5 MG/3 ML (3 mL Nebulization Given 9/20/19 1958)   predniSONE (DELTASONE) tablet 20 mg (20 mg Oral Given 9/20/19 2012)   budesonide (PULMICORT) 500 mcg/2 ml nebulizer suspension (500 mcg Nebulization Given 9/20/19 2334)         Lab findings:  Recent Results (from the past 12 hour(s))   EKG, 12 LEAD, INITIAL    Collection Time: 09/20/19  6:19 PM   Result Value Ref Range    Ventricular Rate 86 BPM    Atrial Rate 86 BPM    P-R Interval 142 ms    QRS Duration 90 ms    Q-T Interval 382 ms    QTC Calculation (Bezet) 457 ms    Calculated P Axis 40 degrees    Calculated R Axis 23 degrees    Calculated T Axis 46 degrees    Diagnosis       Normal sinus rhythm  Cannot rule out Anterior infarct , age undetermined  Abnormal ECG  When compared with ECG of 30-AUG-2019 20:55,  QT has shortened     CBC WITH AUTOMATED DIFF    Collection Time: 09/20/19  6:40 PM   Result Value Ref Range    WBC 5.5 4.6 - 13.2 K/uL    RBC 4.32 4.20 - 5.30 M/uL    HGB 13.1 12.0 - 16.0 g/dL    HCT 37.5 35.0 - 45.0 %    MCV 86.8 74.0 - 97.0 FL    MCH 30.3 24.0 - 34.0 PG    MCHC 34.9 31.0 - 37.0 g/dL    RDW 13.7 11.6 - 14.5 %    PLATELET 321 006 - 028 K/uL    MPV 8.9 (L) 9.2 - 11.8 FL    NEUTROPHILS 50 40 - 73 %    LYMPHOCYTES 39 21 - 52 %    MONOCYTES 8 3 - 10 %    EOSINOPHILS 3 0 - 5 %    BASOPHILS 0 0 - 2 %    ABS. NEUTROPHILS 2.8 1.8 - 8.0 K/UL    ABS. LYMPHOCYTES 2.2 0.9 - 3.6 K/UL    ABS. MONOCYTES 0.4 0.05 - 1.2 K/UL    ABS. EOSINOPHILS 0.2 0.0 - 0.4 K/UL    ABS.  BASOPHILS 0.0 0.0 - 0.1 K/UL    DF AUTOMATED     METABOLIC PANEL, COMPREHENSIVE    Collection Time: 09/20/19  6:40 PM   Result Value Ref Range    Sodium 141 136 - 145 mmol/L    Potassium 3.6 3.5 - 5.5 mmol/L    Chloride 105 100 - 111 mmol/L    CO2 32 21 - 32 mmol/L    Anion gap 4 3.0 - 18 mmol/L    Glucose 184 (H) 74 - 99 mg/dL    BUN 10 7.0 - 18 MG/DL    Creatinine 0.88 0.6 - 1.3 MG/DL    BUN/Creatinine ratio 11 (L) 12 - 20      GFR est AA >60 >60 ml/min/1.73m2    GFR est non-AA >60 >60 ml/min/1.73m2    Calcium 9.3 8.5 - 10.1 MG/DL    Bilirubin, total 0.5 0.2 - 1.0 MG/DL    ALT (SGPT) 51 13 - 56 U/L    AST (SGOT) 22 10 - 38 U/L    Alk. phosphatase 86 45 - 117 U/L    Protein, total 7.2 6.4 - 8.2 g/dL    Albumin 3.6 3.4 - 5.0 g/dL    Globulin 3.6 2.0 - 4.0 g/dL    A-G Ratio 1.0 0.8 - 1.7     PROTHROMBIN TIME + INR    Collection Time: 09/20/19  6:40 PM   Result Value Ref Range    Prothrombin time 11.9 11.5 - 15.2 sec    INR 0.9 0.8 - 1.2     CARDIAC PANEL,(CK, CKMB & TROPONIN)    Collection Time: 09/20/19  6:40 PM   Result Value Ref Range    CK 90 26 - 192 U/L    CK - MB 1.3 <3.6 ng/ml    CK-MB Index 1.4 0.0 - 4.0 %    Troponin-I, QT <0.02 0.0 - 0.045 NG/ML   MAGNESIUM    Collection Time: 09/20/19  6:40 PM   Result Value Ref Range    Magnesium 1.5 (L) 1.6 - 2.6 mg/dL   NT-PRO BNP    Collection Time: 09/20/19  6:40 PM   Result Value Ref Range    NT pro-BNP 72 0 - 900 PG/ML   POC G3    Collection Time: 09/21/19 12:27 AM   Result Value Ref Range    Device: NASAL CANNULA      Flow rate (POC) 2.0 L/M    pH (POC) 7.391 7.35 - 7.45      pCO2 (POC) 44.0 35.0 - 45.0 MMHG    pO2 (POC) 99 80 - 100 MMHG    HCO3 (POC) 26.7 (H) 22 - 26 MMOL/L    sO2 (POC) 98 (H) 92 - 97 %    Base excess (POC) 1 mmol/L    Allens test (POC) YES      Total resp. rate 20      Site RIGHT RADIAL      Specimen type (POC) ARTERIAL      Performed by Marce Steward             X-Ray, CT or other radiology findings or impressions:  XR CHEST PA LAT    (Results Pending)     No acute finding per my read + chronic findings related to COPD. Disposition:    Diagnosis:   1.  COPD exacerbation ([address]        Disposition: to Home      Follow-up Information     Follow up With Specialties Details Why Contact Info    Imani Recinos MD [facility] In 2 days  [address] [facility]  [phone]             Discharge Medication List as of 9/21/2019  1:17 AM      CONTINUE these medications which have NOT CHANGED    Details   ! ! predniSONE (DELTASONE) 20 mg tablet Take 40 mg by mouth daily (with breakfast). , Print, Disp-2 Tab, R-0      !! predniSONE (DELTASONE) 10 mg tablet Take 30 mg by mouth daily (with breakfast). , Print, Disp-4 Tab, R-0      !! predniSONE (DELTASONE) 20 mg tablet Take 20 mg by mouth daily (with breakfast). , Print, Disp-4 Tab, R-0      HYPER-SAL 3.5 % nebu USE 1 VIAL IN NEBULIZER TWICE DAILY, Historical Med, R-6, ERLINDA      !! predniSONE (DELTASONE) 10 mg tablet 4 tablets every morning with breakfast for 10 days then 1 tablet every other morning with breakfast, Normal, Disp-50 Tab, R-2      lisinopril (PRINIVIL, ZESTRIL) 20 mg tablet Take 20 mg by mouth two (2) times a day., Historical Med      albuterol sulfate (PROVENTIL;VENTOLIN) 2.5 mg/0.5 mL nebu nebulizer solution 0.5 mL by Nebulization route four (4) times daily as needed for Wheezing. To be used with HyperSal nebulizer solution. ICD code: COPD J44.1, Print, Disp-60 mL, R-3      cetirizine (ZYRTEC) 10 mg tablet Take 10 mg by mouth daily as needed for Allergies or Rhinitis., Historical Med      fluticasone propionate (FLONASE ALLERGY RELIEF) 50 mcg/actuation nasal spray 2 Sprays by Both Nostrils route daily. , Historical Med      fluticasone propion-salmeterol (ADVAIR HFA) 230-21 mcg/actuation inhaler Take 2 Puffs by inhalation two (2) times a day., Historical Med      hydroCHLOROthiazide (HYDRODIURIL) 12.5 mg tablet Take 12.5 mg by mouth daily. , Historical Med      tiotropium bromide (SPIRIVA RESPIMAT) 2.5 mcg/actuation inhaler Take 2 Puffs by inhalation daily. , Historical Med      insulin glargine (LANTUS,BASAGLAR) 100 unit/mL (3 mL) inpn 35 Units by SubCUTAneous route nightly., Normal, Disp-28 Units, R-0      albuterol sulfate 90 mcg/actuation aepb Take 1 Puff by inhalation every four (4) hours. , Print, Disp-1 Inhaler, R-0      NOVOLOG FLEXPEN U-100 INSULIN 100 unit/mL inpn Continue home Sliding scale insulin as prior to admission, Print, Disp-1 Pen, R-0, ERLINDA      omeprazole (PRILOSEC) 40 mg capsule Take 40 mg by mouth daily. Indications: gastroesophageal reflux disease, Historical Med      cpap machine kit by Does Not Apply route nightly. M.E.D., Historical Med      OXYGEN-AIR DELIVERY SYSTEMS 2 L by Nasal route continuous. First Choice, Historical Med      aspirin delayed-release 81 mg tablet Take 81 mg by mouth daily. , Historical Med      famotidine (PEPCID) 20 mg tablet Take 20 mg by mouth two (2) times daily as needed for Other (reflux). , Historical Med      montelukast (SINGULAIR) 10 mg tablet Take 10 mg by mouth nightly., Historical Med       !! - Potential duplicate medications found. Please discuss with provider. Return to the ER if you are unable to obtain referral as directed. Gabriel Holstein Goodman's  results have been reviewed with her. She has been counseled regarding her diagnosis, treatment, and plan. She verbally conveys understanding and agreement of the signs, symptoms, diagnosis, treatment and prognosis and additionally agrees to follow up as discussed. She also agrees with the care-plan and conveys that all of her questions have been answered. I have also provided discharge instructions for her that include: educational information regarding their diagnosis and treatment, and list of reasons why they would want to return to the ED prior to their follow-up appointment, should her condition change. Melvin Adkins ENP-C,FNP-C      Dragon voice recognition was used to generate this report, which may have resulted in some phonetic based errors in grammar and contents.  Even though attempts were made to correct all the mistakes, some may have been missed, and remained in the body of the document

## 2019-10-14 ENCOUNTER — APPOINTMENT (OUTPATIENT)
Dept: GENERAL RADIOLOGY | Age: 59
DRG: 190 | End: 2019-10-14
Attending: EMERGENCY MEDICINE
Payer: MEDICARE

## 2019-10-14 ENCOUNTER — HOSPITAL ENCOUNTER (INPATIENT)
Age: 59
LOS: 3 days | Discharge: HOME | DRG: 190 | End: 2019-10-17
Attending: EMERGENCY MEDICINE | Admitting: FAMILY MEDICINE
Payer: MEDICARE

## 2019-10-14 DIAGNOSIS — J44.1 ACUTE EXACERBATION OF CHRONIC OBSTRUCTIVE PULMONARY DISEASE (COPD) (HCC): Primary | ICD-10-CM

## 2019-10-14 LAB
ALBUMIN SERPL-MCNC: 3.9 G/DL (ref 3.4–5)
ALBUMIN/GLOB SERPL: 0.9 {RATIO} (ref 0.8–1.7)
ALP SERPL-CCNC: 106 U/L (ref 45–117)
ALT SERPL-CCNC: 42 U/L (ref 13–56)
ANION GAP SERPL CALC-SCNC: 6 MMOL/L (ref 3–18)
APPEARANCE UR: CLEAR
ARTERIAL PATENCY WRIST A: YES
AST SERPL-CCNC: 16 U/L (ref 10–38)
ATRIAL RATE: 73 BPM
BASE EXCESS BLD CALC-SCNC: 3 MMOL/L
BASOPHILS # BLD: 0 K/UL (ref 0–0.1)
BASOPHILS NFR BLD: 0 % (ref 0–2)
BDY SITE: ABNORMAL
BILIRUB SERPL-MCNC: 0.6 MG/DL (ref 0.2–1)
BILIRUB UR QL: NEGATIVE
BNP SERPL-MCNC: 41 PG/ML (ref 0–900)
BUN SERPL-MCNC: 13 MG/DL (ref 7–18)
BUN/CREAT SERPL: 14 (ref 12–20)
CALCIUM SERPL-MCNC: 9.4 MG/DL (ref 8.5–10.1)
CALCULATED P AXIS, ECG09: 32 DEGREES
CALCULATED R AXIS, ECG10: 31 DEGREES
CALCULATED T AXIS, ECG11: 39 DEGREES
CHLORIDE SERPL-SCNC: 103 MMOL/L (ref 100–111)
CO2 SERPL-SCNC: 28 MMOL/L (ref 21–32)
COLOR UR: YELLOW
CREAT SERPL-MCNC: 0.92 MG/DL (ref 0.6–1.3)
DIAGNOSIS, 93000: NORMAL
DIFFERENTIAL METHOD BLD: ABNORMAL
EOSINOPHIL # BLD: 0.3 K/UL (ref 0–0.4)
EOSINOPHIL NFR BLD: 6 % (ref 0–5)
ERYTHROCYTE [DISTWIDTH] IN BLOOD BY AUTOMATED COUNT: 14 % (ref 11.6–14.5)
EST. AVERAGE GLUCOSE BLD GHB EST-MCNC: 192 MG/DL
GAS FLOW.O2 O2 DELIVERY SYS: ABNORMAL L/MIN
GAS FLOW.O2 SETTING OXYMISER: 2 L/M
GLOBULIN SER CALC-MCNC: 4.3 G/DL (ref 2–4)
GLUCOSE BLD STRIP.AUTO-MCNC: 396 MG/DL (ref 70–110)
GLUCOSE SERPL-MCNC: 228 MG/DL (ref 74–99)
GLUCOSE UR STRIP.AUTO-MCNC: >1000 MG/DL
HBA1C MFR BLD: 8.3 % (ref 4.2–5.6)
HCO3 BLD-SCNC: 28.8 MMOL/L (ref 22–26)
HCT VFR BLD AUTO: 40.1 % (ref 35–45)
HGB BLD-MCNC: 13.9 G/DL (ref 12–16)
HGB UR QL STRIP: NEGATIVE
KETONES UR QL STRIP.AUTO: NEGATIVE MG/DL
LACTATE BLD-SCNC: 1.75 MMOL/L (ref 0.4–2)
LEUKOCYTE ESTERASE UR QL STRIP.AUTO: NEGATIVE
LIPASE SERPL-CCNC: 98 U/L (ref 73–393)
LYMPHOCYTES # BLD: 2 K/UL (ref 0.9–3.6)
LYMPHOCYTES NFR BLD: 35 % (ref 21–52)
MAGNESIUM SERPL-MCNC: 1.6 MG/DL (ref 1.6–2.6)
MCH RBC QN AUTO: 30 PG (ref 24–34)
MCHC RBC AUTO-ENTMCNC: 34.7 G/DL (ref 31–37)
MCV RBC AUTO: 86.6 FL (ref 74–97)
MONOCYTES # BLD: 0.3 K/UL (ref 0.05–1.2)
MONOCYTES NFR BLD: 6 % (ref 3–10)
NEUTS SEG # BLD: 3.2 K/UL (ref 1.8–8)
NEUTS SEG NFR BLD: 53 % (ref 40–73)
NITRITE UR QL STRIP.AUTO: NEGATIVE
O2/TOTAL GAS SETTING VFR VENT: 0.28 %
P-R INTERVAL, ECG05: 176 MS
PCO2 BLD: 48.6 MMHG (ref 35–45)
PH BLD: 7.38 [PH] (ref 7.35–7.45)
PH UR STRIP: 5 [PH] (ref 5–8)
PLATELET # BLD AUTO: 310 K/UL (ref 135–420)
PMV BLD AUTO: 9.1 FL (ref 9.2–11.8)
PO2 BLD: 75 MMHG (ref 80–100)
POTASSIUM SERPL-SCNC: 4 MMOL/L (ref 3.5–5.5)
PROT SERPL-MCNC: 8.2 G/DL (ref 6.4–8.2)
PROT UR STRIP-MCNC: NEGATIVE MG/DL
Q-T INTERVAL, ECG07: 374 MS
QRS DURATION, ECG06: 64 MS
QTC CALCULATION (BEZET), ECG08: 412 MS
RBC # BLD AUTO: 4.63 M/UL (ref 4.2–5.3)
SAO2 % BLD: 94 % (ref 92–97)
SERVICE CMNT-IMP: ABNORMAL
SODIUM SERPL-SCNC: 137 MMOL/L (ref 136–145)
SP GR UR REFRACTOMETRY: 1.03 (ref 1–1.03)
SPECIMEN TYPE: ABNORMAL
TROPONIN I SERPL-MCNC: <0.02 NG/ML (ref 0–0.04)
UROBILINOGEN UR QL STRIP.AUTO: 0.2 EU/DL (ref 0.2–1)
VENTRICULAR RATE, ECG03: 73 BPM
WBC # BLD AUTO: 5.9 K/UL (ref 4.6–13.2)

## 2019-10-14 PROCEDURE — 94762 N-INVAS EAR/PLS OXIMTRY CONT: CPT

## 2019-10-14 PROCEDURE — 80053 COMPREHEN METABOLIC PANEL: CPT

## 2019-10-14 PROCEDURE — 84484 ASSAY OF TROPONIN QUANT: CPT

## 2019-10-14 PROCEDURE — 96365 THER/PROPH/DIAG IV INF INIT: CPT

## 2019-10-14 PROCEDURE — 74011636637 HC RX REV CODE- 636/637: Performed by: STUDENT IN AN ORGANIZED HEALTH CARE EDUCATION/TRAINING PROGRAM

## 2019-10-14 PROCEDURE — 71046 X-RAY EXAM CHEST 2 VIEWS: CPT

## 2019-10-14 PROCEDURE — 81003 URINALYSIS AUTO W/O SCOPE: CPT

## 2019-10-14 PROCEDURE — 74011000250 HC RX REV CODE- 250: Performed by: STUDENT IN AN ORGANIZED HEALTH CARE EDUCATION/TRAINING PROGRAM

## 2019-10-14 PROCEDURE — 83036 HEMOGLOBIN GLYCOSYLATED A1C: CPT

## 2019-10-14 PROCEDURE — 77010033678 HC OXYGEN DAILY

## 2019-10-14 PROCEDURE — 96375 TX/PRO/DX INJ NEW DRUG ADDON: CPT

## 2019-10-14 PROCEDURE — 99285 EMERGENCY DEPT VISIT HI MDM: CPT

## 2019-10-14 PROCEDURE — 82803 BLOOD GASES ANY COMBINATION: CPT

## 2019-10-14 PROCEDURE — 96368 THER/DIAG CONCURRENT INF: CPT

## 2019-10-14 PROCEDURE — 36600 WITHDRAWAL OF ARTERIAL BLOOD: CPT

## 2019-10-14 PROCEDURE — 83605 ASSAY OF LACTIC ACID: CPT

## 2019-10-14 PROCEDURE — 85025 COMPLETE CBC W/AUTO DIFF WBC: CPT

## 2019-10-14 PROCEDURE — 83880 ASSAY OF NATRIURETIC PEPTIDE: CPT

## 2019-10-14 PROCEDURE — 82962 GLUCOSE BLOOD TEST: CPT

## 2019-10-14 PROCEDURE — 94640 AIRWAY INHALATION TREATMENT: CPT

## 2019-10-14 PROCEDURE — 74011250636 HC RX REV CODE- 250/636: Performed by: STUDENT IN AN ORGANIZED HEALTH CARE EDUCATION/TRAINING PROGRAM

## 2019-10-14 PROCEDURE — 74011250637 HC RX REV CODE- 250/637: Performed by: STUDENT IN AN ORGANIZED HEALTH CARE EDUCATION/TRAINING PROGRAM

## 2019-10-14 PROCEDURE — 83735 ASSAY OF MAGNESIUM: CPT

## 2019-10-14 PROCEDURE — 94761 N-INVAS EAR/PLS OXIMETRY MLT: CPT

## 2019-10-14 PROCEDURE — 93005 ELECTROCARDIOGRAM TRACING: CPT

## 2019-10-14 PROCEDURE — 83690 ASSAY OF LIPASE: CPT

## 2019-10-14 PROCEDURE — 65660000000 HC RM CCU STEPDOWN

## 2019-10-14 RX ORDER — FLUTICASONE PROPIONATE 50 MCG
2 SPRAY, SUSPENSION (ML) NASAL DAILY
Status: DISCONTINUED | OUTPATIENT
Start: 2019-10-15 | End: 2019-10-17 | Stop reason: HOSPADM

## 2019-10-14 RX ORDER — LEVOFLOXACIN 5 MG/ML
750 INJECTION, SOLUTION INTRAVENOUS
Status: COMPLETED | OUTPATIENT
Start: 2019-10-14 | End: 2019-10-14

## 2019-10-14 RX ORDER — PANTOPRAZOLE SODIUM 40 MG/1
40 TABLET, DELAYED RELEASE ORAL
Status: DISCONTINUED | OUTPATIENT
Start: 2019-10-15 | End: 2019-10-17 | Stop reason: HOSPADM

## 2019-10-14 RX ORDER — LORATADINE 10 MG/1
10 TABLET ORAL
Status: ON HOLD | COMMUNITY
End: 2020-12-18

## 2019-10-14 RX ORDER — ASPIRIN 81 MG/1
81 TABLET ORAL DAILY
Status: DISCONTINUED | OUTPATIENT
Start: 2019-10-15 | End: 2019-10-17 | Stop reason: HOSPADM

## 2019-10-14 RX ORDER — KETOROLAC TROMETHAMINE 15 MG/ML
15 INJECTION, SOLUTION INTRAMUSCULAR; INTRAVENOUS
Status: DISCONTINUED | OUTPATIENT
Start: 2019-10-14 | End: 2019-10-14

## 2019-10-14 RX ORDER — INSULIN LISPRO 100 [IU]/ML
INJECTION, SOLUTION INTRAVENOUS; SUBCUTANEOUS
Status: DISCONTINUED | OUTPATIENT
Start: 2019-10-14 | End: 2019-10-14

## 2019-10-14 RX ORDER — ACETAMINOPHEN 500 MG
500-1000 TABLET ORAL
Status: DISCONTINUED | OUTPATIENT
Start: 2019-10-14 | End: 2019-10-14

## 2019-10-14 RX ORDER — ALBUTEROL SULFATE 0.83 MG/ML
10 SOLUTION RESPIRATORY (INHALATION)
Status: COMPLETED | OUTPATIENT
Start: 2019-10-14 | End: 2019-10-14

## 2019-10-14 RX ORDER — INSULIN LISPRO 100 [IU]/ML
INJECTION, SOLUTION INTRAVENOUS; SUBCUTANEOUS
Status: DISCONTINUED | OUTPATIENT
Start: 2019-10-15 | End: 2019-10-17 | Stop reason: HOSPADM

## 2019-10-14 RX ORDER — ENOXAPARIN SODIUM 100 MG/ML
40 INJECTION SUBCUTANEOUS EVERY 24 HOURS
Status: DISCONTINUED | OUTPATIENT
Start: 2019-10-14 | End: 2019-10-17 | Stop reason: HOSPADM

## 2019-10-14 RX ORDER — IPRATROPIUM BROMIDE AND ALBUTEROL SULFATE 2.5; .5 MG/3ML; MG/3ML
3 SOLUTION RESPIRATORY (INHALATION)
Status: DISCONTINUED | OUTPATIENT
Start: 2019-10-14 | End: 2019-10-17 | Stop reason: HOSPADM

## 2019-10-14 RX ORDER — HYDROCHLOROTHIAZIDE 25 MG/1
12.5 TABLET ORAL DAILY
Status: DISCONTINUED | OUTPATIENT
Start: 2019-10-15 | End: 2019-10-17 | Stop reason: HOSPADM

## 2019-10-14 RX ORDER — MAGNESIUM SULFATE 100 %
4 CRYSTALS MISCELLANEOUS AS NEEDED
Status: DISCONTINUED | OUTPATIENT
Start: 2019-10-14 | End: 2019-10-17 | Stop reason: HOSPADM

## 2019-10-14 RX ORDER — SIMETHICONE 125 MG
125 CAPSULE ORAL
Status: ON HOLD | COMMUNITY
End: 2020-12-18

## 2019-10-14 RX ORDER — DEXTROSE 50 % IN WATER (D50W) INTRAVENOUS SYRINGE
25-50 AS NEEDED
Status: DISCONTINUED | OUTPATIENT
Start: 2019-10-14 | End: 2019-10-14 | Stop reason: SDUPTHER

## 2019-10-14 RX ORDER — SODIUM CHLORIDE 0.9 % (FLUSH) 0.9 %
5-40 SYRINGE (ML) INJECTION EVERY 8 HOURS
Status: DISCONTINUED | OUTPATIENT
Start: 2019-10-14 | End: 2019-10-14

## 2019-10-14 RX ORDER — DEXTROSE MONOHYDRATE 100 MG/ML
125-250 INJECTION, SOLUTION INTRAVENOUS AS NEEDED
Status: DISCONTINUED | OUTPATIENT
Start: 2019-10-14 | End: 2019-10-17 | Stop reason: HOSPADM

## 2019-10-14 RX ORDER — IPRATROPIUM BROMIDE 0.5 MG/2.5ML
0.5 SOLUTION RESPIRATORY (INHALATION)
Status: DISCONTINUED | OUTPATIENT
Start: 2019-10-14 | End: 2019-10-14

## 2019-10-14 RX ORDER — ONDANSETRON 4 MG/1
4 TABLET, ORALLY DISINTEGRATING ORAL
Status: DISCONTINUED | OUTPATIENT
Start: 2019-10-14 | End: 2019-10-17 | Stop reason: HOSPADM

## 2019-10-14 RX ORDER — ALBUTEROL SULFATE 0.83 MG/ML
10 SOLUTION RESPIRATORY (INHALATION)
Status: DISCONTINUED | OUTPATIENT
Start: 2019-10-14 | End: 2019-10-14

## 2019-10-14 RX ORDER — LEVOFLOXACIN 250 MG/1
500 TABLET ORAL EVERY 24 HOURS
Status: DISCONTINUED | OUTPATIENT
Start: 2019-10-15 | End: 2019-10-17 | Stop reason: HOSPADM

## 2019-10-14 RX ORDER — INSULIN GLARGINE 100 [IU]/ML
17 INJECTION, SOLUTION SUBCUTANEOUS
Status: DISCONTINUED | OUTPATIENT
Start: 2019-10-14 | End: 2019-10-15

## 2019-10-14 RX ORDER — IPRATROPIUM BROMIDE AND ALBUTEROL SULFATE 2.5; .5 MG/3ML; MG/3ML
3 SOLUTION RESPIRATORY (INHALATION)
Status: DISCONTINUED | OUTPATIENT
Start: 2019-10-14 | End: 2019-10-14

## 2019-10-14 RX ORDER — LISINOPRIL 20 MG/1
20 TABLET ORAL 2 TIMES DAILY
Status: DISCONTINUED | OUTPATIENT
Start: 2019-10-14 | End: 2019-10-17 | Stop reason: HOSPADM

## 2019-10-14 RX ORDER — MAGNESIUM SULFATE HEPTAHYDRATE 40 MG/ML
2 INJECTION, SOLUTION INTRAVENOUS ONCE
Status: COMPLETED | OUTPATIENT
Start: 2019-10-14 | End: 2019-10-14

## 2019-10-14 RX ORDER — SODIUM CHLORIDE 0.9 % (FLUSH) 0.9 %
5-40 SYRINGE (ML) INJECTION AS NEEDED
Status: DISCONTINUED | OUTPATIENT
Start: 2019-10-14 | End: 2019-10-14

## 2019-10-14 RX ORDER — ACETAMINOPHEN 325 MG/1
650 TABLET ORAL
Status: DISCONTINUED | OUTPATIENT
Start: 2019-10-14 | End: 2019-10-17 | Stop reason: HOSPADM

## 2019-10-14 RX ADMIN — LISINOPRIL 20 MG: 20 TABLET ORAL at 20:41

## 2019-10-14 RX ADMIN — ALBUTEROL SULFATE 10 MG: 2.5 SOLUTION RESPIRATORY (INHALATION) at 13:07

## 2019-10-14 RX ADMIN — METHYLPREDNISOLONE SODIUM SUCCINATE 60 MG: 40 INJECTION, POWDER, FOR SOLUTION INTRAMUSCULAR; INTRAVENOUS at 13:07

## 2019-10-14 RX ADMIN — IPRATROPIUM BROMIDE AND ALBUTEROL SULFATE 3 ML: .5; 3 SOLUTION RESPIRATORY (INHALATION) at 20:42

## 2019-10-14 RX ADMIN — LEVOFLOXACIN 750 MG: 5 INJECTION, SOLUTION INTRAVENOUS at 13:08

## 2019-10-14 RX ADMIN — INSULIN GLARGINE 17 UNITS: 100 INJECTION, SOLUTION SUBCUTANEOUS at 23:13

## 2019-10-14 RX ADMIN — MAGNESIUM SULFATE HEPTAHYDRATE 2 G: 40 INJECTION, SOLUTION INTRAVENOUS at 13:08

## 2019-10-14 RX ADMIN — IPRATROPIUM BROMIDE AND ALBUTEROL SULFATE 3 ML: .5; 3 SOLUTION RESPIRATORY (INHALATION) at 23:31

## 2019-10-14 RX ADMIN — INSULIN LISPRO 10 UNITS: 100 INJECTION, SOLUTION INTRAVENOUS; SUBCUTANEOUS at 23:13

## 2019-10-14 RX ADMIN — ENOXAPARIN SODIUM 40 MG: 40 INJECTION SUBCUTANEOUS at 17:20

## 2019-10-14 NOTE — MED STUDENT NOTES
Admission History and Physical  [address]  This is a medical student note for learning purposes only. Patient: Carrol Last MRN: [id number]  CSN: [id number]    YOB: 1960  Age: 60 y.o. Sex: female      DOA: 10/14/2019       HPI:     Carrol Last is a 60 y.o. female with PMH of COPD on home O2, HTN, T2DM, SHERRIE on Trilogy, GERD, allergic rhinitis, now presenting with complaint of dyspnea and cough for 2 days. She states she \"never got better after last admission\" (8/30/2019) since then she has been tapered from Prednisone. Reports >12 home nebulizer treatments over the past day without relief of symptoms. Endorses 3 episodes of finger-tip sized blood clots in sputum last night. Reports usual sputum of light green unchanged. Pt uses 2L O2 NC at home during the day and with her Trilogy at night. Pt sleeps in recliner chair at home or with 4 pillows in bed. Since last admission she has been using rolling walker for ambulation and has had to use a wheelchair for the past 2 days. She lives at home with her daughter and her 2 children. She states her grandson had recently been ill with Strep throat. ED Course:   Appeared to be in respiratory distress. Started on continuous albuterol nebulizer for 1 hour. ABG obtained with CO2 of 48. Lactic acid 1.75. Urinalysis: >1000 glucose  CBC: MPV 9.1, Eosinophils 6  CMP: Glucose 228  Lipase 98  Troponin <0.02  NT pro-BNP 41  Hgb A1c 8.3  Patient saturating at 97% of home O2 level of 2L NC. Worsening of inspiratory/expiratory wheezing after continuous nebs. Patient saturating 95% and appears fatigued. Will proceed with another 10 mg albuterol, 2.5 mg ipratropium and 10 KCl.     Review of Systems  General ROS: negative for  - chills, fever, night sweats, weight gain and weight loss  Psychological ROS: negative for - anxiety and depression  Ophthalmic ROS: negative for - blurry vision, decreased vision or loss of vision  HEENT ROS: endorses headaches and nasal congestion. Negative for hearing change or visual changes  Hematological and Lymphatic ROS: negative for - bruising, jaundice  Respiratory ROS: endorses cough, hemoptysis, shortness of breath, orthopnea, paroxysmal dyspnea, or wheezing  Cardiovascular ROS: negative for - chest pain edema, loss of consciousness, or palpitations   Gastrointestinal ROS: endorses abdominal pain and chronic periumbilical hernia, difficulty swallowing, and nausea. Negative for - blood in stools, change in stools, constipation, diarrhea, hematemesis, melena, vomiting or swallowing pain.   Genito-Urinary ROS: negative for - dysuria, hematuria or urinary frequency/urgency  Musculoskeletal ROS: negative for - joint pain, joint swelling or muscle pain  Neurological ROS: endorses headache. negative for - dizziness, numbness/tingling or weakness  Dermatological ROS: negative for - rash or skin lesion changes      Past Medical History:   Diagnosis Date    Asthma     Chronic lung disease     COPD     Cystocele, midline     Diabetes mellitus ([facility] [address]     GERD (gastroesophageal reflux disease)     Hidradenitis suppurativa     Hyperlipidemia     Hypertension     SHERRIE on CPAP     CPAP    Stress incontinence        Past Surgical History:   Procedure Laterality Date    BREAST SURGERY PROCEDURE UNLISTED      Right breast benign tumor removal    HX APPENDECTOMY      HX CHOLECYSTECTOMY      HX DILATION AND CURETTAGE      Dysfunctional uterine bleeding, thought 2/2 fibroids    HX TUBAL LIGATION         Family History   Problem Relation Age of Onset    Hypertension Mother     Stroke Mother     Breast Cancer Mother         Bilateral mastectomies    Cancer Mother         ovarian and breast    Heart Failure Mother     Heart Attack Father         2011    Heart Surgery Father         CABG    Heart Failure Father     COPD Sister         Heavy smoker    Cancer Sister         ovarian    Heart Failure Sister     Lung Disease Sister     Asthma Child     Cancer Maternal Aunt         pancreatic    Cancer Maternal Grandfather         stomach       Social History     Socioeconomic History    Marital status: LEGALLY      Spouse name: Not on file    Number of children: Not on file    Years of education: Not on file    Highest education level: Not on file   Tobacco Use    Smoking status: Former Smoker     Packs/day: 1.00     Years: 30.00     Pack years: 30.00     Types: Cigarettes     Start date: 1966     Last attempt to quit: 2006     Years since quittin.1    Smokeless tobacco: Never Used    Tobacco comment: 1-1.5 packs per day   Substance and Sexual Activity    Alcohol use: No    Drug use: No    Sexual activity: Never       Allergies   Allergen Reactions    Ancef [Cefazolin] Hives    Contrast Agent [Iodine] Anaphylaxis, Shortness of Breath and Swelling     Needs pre-medication for IV contrast with Benadryl, Solu-Medrol    Fish Containing Products Anaphylaxis     Pt states she had a severe allergic reaction at 9 y/o.  Metformin Other (comments)     Abdominal pain, diarrhea.  Codeine Other (comments)     Altered mental status       Prior to Admission Medications   Prescriptions Last Dose Informant Patient Reported? Taking? HYPER-SAL 3.5 % nebu   Yes No   Sig: USE 1 VIAL IN NEBULIZER TWICE DAILY   NOVOLOG FLEXPEN U-100 INSULIN 100 unit/mL inpn  Self No No   Sig: Continue home Sliding scale insulin as prior to admission   Patient taking differently: 1 Units by SubCUTAneous route three (3) times daily. If BG <100=0u  101-150=5u  151-250=8u  251-300=12u  >300 call MD     OXYGEN-AIR DELIVERY SYSTEMS   Yes No   Si L by Nasal route continuous. [facility]   albuterol sulfate (PROVENTIL;VENTOLIN) 2.5 mg/0.5 mL nebu nebulizer solution   No No   Si.5 mL by Nebulization route four (4) times daily as needed for Wheezing. To be used with HyperSal nebulizer solution.  ICD code: COPD J44.1   albuterol sulfate 90 mcg/actuation aepb   No No   Sig: Take 1 Puff by inhalation every four (4) hours. Patient taking differently: Take 1 Puff by inhalation every four (4) hours as needed. aspirin delayed-release 81 mg tablet   Yes No   Sig: Take 81 mg by mouth daily. cetirizine (ZYRTEC) 10 mg tablet   Yes No   Sig: Take 10 mg by mouth daily as needed for Allergies or Rhinitis. cpap machine kit   Yes No   Sig: by Does Not Apply route nightly. M.E.D.   famotidine (PEPCID) 20 mg tablet   Yes No   Sig: Take 20 mg by mouth two (2) times daily as needed for Other (reflux). fluticasone propion-salmeterol (ADVAIR HFA) 230-21 mcg/actuation inhaler   Yes No   Sig: Take 2 Puffs by inhalation two (2) times a day. fluticasone propionate (FLONASE ALLERGY RELIEF) 50 mcg/actuation nasal spray   Yes No   Si Sprays by Both Nostrils route daily. hydroCHLOROthiazide (HYDRODIURIL) 12.5 mg tablet   Yes No   Sig: Take 12.5 mg by mouth daily. insulin glargine (LANTUS,BASAGLAR) 100 unit/mL (3 mL) inpn   No No   Si Units by SubCUTAneous route nightly. Patient taking differently: 30 Units by SubCUTAneous route nightly. lisinopril (PRINIVIL, ZESTRIL) 20 mg tablet   Yes No   Sig: Take 20 mg by mouth two (2) times a day. montelukast (SINGULAIR) 10 mg tablet   Yes No   Sig: Take 10 mg by mouth nightly. omeprazole (PRILOSEC) 40 mg capsule   Yes No   Sig: Take 40 mg by mouth daily. Indications: gastroesophageal reflux disease   predniSONE (DELTASONE) 10 mg tablet   No No   Si tablets every morning with breakfast for 10 days then 1 tablet every other morning with breakfast   predniSONE (DELTASONE) 10 mg tablet   No No   Sig: Take 30 mg by mouth daily (with breakfast). predniSONE (DELTASONE) 20 mg tablet   No No   Sig: Take 40 mg by mouth daily (with breakfast). predniSONE (DELTASONE) 20 mg tablet   No No   Sig: Take 20 mg by mouth daily (with breakfast).    tiotropium bromide (SPIRIVA RESPIMAT) 2.5 mcg/actuation inhaler   Yes No   Sig: Take 2 Puffs by inhalation daily. Facility-Administered Medications: None       Physical Exam:     Patient Vitals for the past 24 hrs:   Pulse Resp BP SpO2   10/14/19 1221 -- -- -- 97 %   10/14/19 0952 92 22 159/85 92 %       Physical Exam:   General:  Alert and Responsive. HEENT: Conjunctiva pink, sclera anicteric. EOMI. Pharynx moist, nonerythematous. Moist mucous membranes. No other gross abnormalities appreciated. CV:  RRR. No murmurs, rubs, or gallops appreciated. No visible pulsations or thrills. RESP:  Appears to be in respiratory distress. Increased work of breathing. Inspiratory and expiratory wheezing throughout. Equal expansion bilaterally. ABD:  Obese, soft, mildly tender and reducible periumbilical hernia. Normoactive bowel sounds. No hepatosplenomegaly. No suprapubic tenderness. MS:  No joint deformity or instability. No atrophy. Neuro: A+Ox3. Sensation intact throughout. Ext:  No edema. 2+ radial and dp pulses bilaterally. Skin:  No rashes, lesions, or ulcers. Good turgor.     IMAGING:    Recent Results (from the past 12 hour(s))   EKG, 12 LEAD, INITIAL    Collection Time: 10/14/19 10:05 AM   Result Value Ref Range    Ventricular Rate 73 BPM    Atrial Rate 73 BPM    P-R Interval 176 ms    QRS Duration 64 ms    Q-T Interval 374 ms    QTC Calculation (Bezet) 412 ms    Calculated P Axis 32 degrees    Calculated R Axis 31 degrees    Calculated T Axis 39 degrees    Diagnosis       Normal sinus rhythm  Low voltage QRS  Borderline ECG  When compared with ECG of 20-SEP-2019 18:19,  QRS duration has decreased  QT has shortened  Confirmed by Cam Maddox MD, Anna Horse ([id number]) on 10/14/2019 11:59:06 AM     URINALYSIS W/ RFLX MICROSCOPIC    Collection Time: 10/14/19 11:40 AM   Result Value Ref Range    Color YELLOW      Appearance CLEAR      Specific gravity 1.028 1.005 - 1.030      pH (UA) 5.0 5.0 - 8.0      Protein NEGATIVE  NEG mg/dL Glucose >1,000 (A) NEG mg/dL    Ketone NEGATIVE  NEG mg/dL    Bilirubin NEGATIVE  NEG      Blood NEGATIVE  NEG      Urobilinogen 0.2 0.2 - 1.0 EU/dL    Nitrites NEGATIVE  NEG      Leukocyte Esterase NEGATIVE  NEG     CBC WITH AUTOMATED DIFF    Collection Time: 10/14/19 12:00 PM   Result Value Ref Range    WBC 5.9 4.6 - 13.2 K/uL    RBC 4.63 4.20 - 5.30 M/uL    HGB 13.9 12.0 - 16.0 g/dL    HCT 40.1 35.0 - 45.0 %    MCV 86.6 74.0 - 97.0 FL    MCH 30.0 24.0 - 34.0 PG    MCHC 34.7 31.0 - 37.0 g/dL    RDW 14.0 11.6 - 14.5 %    PLATELET 047 757 - 596 K/uL    MPV 9.1 (L) 9.2 - 11.8 FL    NEUTROPHILS 53 40 - 73 %    LYMPHOCYTES 35 21 - 52 %    MONOCYTES 6 3 - 10 %    EOSINOPHILS 6 (H) 0 - 5 %    BASOPHILS 0 0 - 2 %    ABS. NEUTROPHILS 3.2 1.8 - 8.0 K/UL    ABS. LYMPHOCYTES 2.0 0.9 - 3.6 K/UL    ABS. MONOCYTES 0.3 0.05 - 1.2 K/UL    ABS. EOSINOPHILS 0.3 0.0 - 0.4 K/UL    ABS. BASOPHILS 0.0 0.0 - 0.1 K/UL    DF AUTOMATED     METABOLIC PANEL, COMPREHENSIVE    Collection Time: 10/14/19 12:00 PM   Result Value Ref Range    Sodium 137 136 - 145 mmol/L    Potassium 4.0 3.5 - 5.5 mmol/L    Chloride 103 100 - 111 mmol/L    CO2 28 21 - 32 mmol/L    Anion gap 6 3.0 - 18 mmol/L    Glucose 228 (H) 74 - 99 mg/dL    BUN 13 7.0 - 18 MG/DL    Creatinine 0.92 0.6 - 1.3 MG/DL    BUN/Creatinine ratio 14 12 - 20      GFR est AA >60 >60 ml/min/1.73m2    GFR est non-AA >60 >60 ml/min/1.73m2    Calcium 9.4 8.5 - 10.1 MG/DL    Bilirubin, total 0.6 0.2 - 1.0 MG/DL    ALT (SGPT) 42 13 - 56 U/L    AST (SGOT) 16 10 - 38 U/L    Alk.  phosphatase 106 45 - 117 U/L    Protein, total 8.2 6.4 - 8.2 g/dL    Albumin 3.9 3.4 - 5.0 g/dL    Globulin 4.3 (H) 2.0 - 4.0 g/dL    A-G Ratio 0.9 0.8 - 1.7     TROPONIN I    Collection Time: 10/14/19 12:00 PM   Result Value Ref Range    Troponin-I, QT <0.02 0.0 - 0.045 NG/ML   LIPASE    Collection Time: 10/14/19 12:00 PM   Result Value Ref Range    Lipase 98 73 - 393 U/L   MAGNESIUM    Collection Time: 10/14/19 12:00 PM Result Value Ref Range    Magnesium 1.6 1.6 - 2.6 mg/dL   NT-PRO BNP    Collection Time: 10/14/19 12:00 PM   Result Value Ref Range    NT pro-BNP 41 0 - 900 PG/ML   POC LACTIC ACID    Collection Time: 10/14/19 12:06 PM   Result Value Ref Range    Lactic Acid (POC) 1.75 0.40 - 2.00 mmol/L   POC G3    Collection Time: 10/14/19 12:45 PM   Result Value Ref Range    Device: NASAL CANNULA      Flow rate (POC) 2 L/M    FIO2 (POC) 0.28 %    pH (POC) 7.381 7.35 - 7.45      pCO2 (POC) 48.6 (H) 35.0 - 45.0 MMHG    pO2 (POC) 75 (L) 80 - 100 MMHG    HCO3 (POC) 28.8 (H) 22 - 26 MMOL/L    sO2 (POC) 94 92 - 97 %    Base excess (POC) 3 mmol/L    Allens test (POC) YES      Site LEFT RADIAL      Specimen type (POC) ARTERIAL      Performed by Chrissy Johnson          Assessment/Plan:   60 y.o. female with PMH of COPD with home O2, SHERRIE with Trilogy at night, T2DM, HTN, GERD, allergic rhinitis, now admitted with acute COPD exacerbation. Acute COPD exacerbation  - increased SOB and cough for 2 days  - admitted 8/2019 for COPD exacerbation and discharged on Prednisone taper  - followed by Dr. Ed Meléndez as an outpatient  - home medications: Advair, Albuterol, Spiriva, Montelukast  - >12 nebulizer treatments with albuterol at home without relief  - ABG in ED on 2L O2 NC: pCO2 48.6, pO@ 75, HCO3 28.8  - CXR in ED: 1.  No acute process.  2.  Hyperinflated lungs suggestive of COPD  - CBC in ED: eosinophils 6%, MPV 9.1  - levofloxacin 750 mg IV x1 given in ED    PLAN:  - Duo-nebs q4hrs  - supplemental O2 NC   - maintain SpO2 88-92%  - methylprednisolone 60 mg IV q12hrs  - tiotropium (Spiriva) 18 mcg inhalation capsule daily  - levofloxacin 500 mg PO daily for 7 days  - continuous pulse oximetry  - cardiac monitoring    Obstructive sleep apnea  - followed by sleep medicine as outpatient  - uses Trilogy machine at night with 2L O2 at home  - sleeps in recliner or in bed with 4 pillows    PLAN:  - use home Trilogy machine while inpatient  - continuous pulse oximetry    Type 2 diabetes mellitus  - home medications: Basaglar 32 units at night, Novalog SSI  - per pt, home blood glucose 200-300 recently  - Hgb A1c in ED 8.3 (est. Avg. Glucose 192)    PLAN:  - diabetic diet  - SSI and POC glucose per protocol    Hypertension  - home medications: ASA, HCTZ, Lisinopril    PLAN:  - lisinopril 20 mg PO BID  - hydrochlorothiazide 12.5 mg PO daily  - ASA 81 mg PO daily  - monitor BP    GERD  - home medications: omeprazole and famotidine    PLAN:  - pantoprazole 40 mg PO daily    Allergic rhinitis  - home medication: fluticasone propionate 50 mcg    PLAN:  - fluticasone propionate 50 mcg 2 sprays in both notrils daily    Diet: diabetic  DVT Prophylaxis: Enoxaparin SubQ  Code Status: FULL  Point of Contact: Court [address] [phone] (relationship: daughter)    Disposition and anticipated LOS: admit to floor. Anticipated LOS 2-3 midnights. Benjamín Nation, MS-4  [facility] Medicine  *ATTENTION:  This note has been created by a medical student for educational purposes only. Please do not refer to the content of this note for clinical decision-making, billing, or other purposes. Please see attending physicians note to obtain clinical information on this patient. *

## 2019-10-14 NOTE — ED PROVIDER NOTES
EMERGENCY DEPARTMENT HISTORY AND PHYSICAL EXAM    11:56 AM    Date: 10/14/2019  Patient Name: Shikha Morales    History of Presenting Illness     Chief Complaint   Patient presents with    Shortness of Breath     History Provided By: Patient  Location/Duration/Severity/Modifying factors     Patient is a 61 yo  female with PMH COPD on home O2, IDDM2, HTN, HFpEF, SHERRIE on CPAP, GERD, allergic rhinitis presents with dyspnea and cough x2days. Patient stated she started developing increasing respiratory symptoms on Saturday evening. She endorses increasing dyspnea, sputum production, and change in sputum color. She came into the ED this morning after doing q1h albuterol treatments at home and having persistent air hunger. She states she has a green sputum and also coughed up some small blood clots last night. No hemoptysis reported today. In addition she complains of generalized abdominal pain. Patient thinks that all of her coughing has put a lot of pressure on her periumbilical hernia. She endorses nausea w/o emesis and also denies any change to her bowel movements.        PCP: Luz Maria Gallegos MD    Current Facility-Administered Medications   Medication Dose Route Frequency Provider Last Rate Last Dose    sodium chloride (NS) flush 5-40 mL  5-40 mL IntraVENous Q8H Ras Miles, DO        sodium chloride (NS) flush 5-40 mL  5-40 mL IntraVENous PRN Ras Miles, DO        levoFLOXacin (LEVAQUIN) 750 mg in D5W IVPB  750 mg IntraVENous NOW Ras Miles,  mL/hr at 10/14/19 1308 750 mg at 10/14/19 1308    acetaminophen (TYLENOL) tablet 500-1,000 mg  500-1,000 mg Oral ONCE PRN Ras Miles, DO        albuterol (PROVENTIL VENTOLIN) nebulizer solution 10 mg  10 mg Nebulization NOW Ras Miles, DO        ipratropium (ATROVENT) 0.02 % nebulizer solution 0.5 mg  0.5 mg Nebulization NOW Ras Miles, DO        potassium chloride 10 mEq, lidocaine (PF) (XYLOCAINE) 10 mg/mL (1 %) 1 mL in 0.9% sodium chloride 100 mL IVPB   IntraVENous ONCE Miles, Ras T, DO         Current Outpatient Medications   Medication Sig Dispense Refill    predniSONE (DELTASONE) 20 mg tablet Take 40 mg by mouth daily (with breakfast). 2 Tab 0    predniSONE (DELTASONE) 10 mg tablet Take 30 mg by mouth daily (with breakfast). 4 Tab 0    predniSONE (DELTASONE) 20 mg tablet Take 20 mg by mouth daily (with breakfast). 4 Tab 0    HYPER-SAL 3.5 % nebu USE 1 VIAL IN NEBULIZER TWICE DAILY  6    predniSONE (DELTASONE) 10 mg tablet 4 tablets every morning with breakfast for 10 days then 1 tablet every other morning with breakfast 50 Tab 2    lisinopril (PRINIVIL, ZESTRIL) 20 mg tablet Take 20 mg by mouth two (2) times a day.  albuterol sulfate (PROVENTIL;VENTOLIN) 2.5 mg/0.5 mL nebu nebulizer solution 0.5 mL by Nebulization route four (4) times daily as needed for Wheezing. To be used with HyperSal nebulizer solution. ICD code: COPD J44.1 60 mL 3    cetirizine (ZYRTEC) 10 mg tablet Take 10 mg by mouth daily as needed for Allergies or Rhinitis.  fluticasone propionate (FLONASE ALLERGY RELIEF) 50 mcg/actuation nasal spray 2 Sprays by Both Nostrils route daily.  fluticasone propion-salmeterol (ADVAIR HFA) 230-21 mcg/actuation inhaler Take 2 Puffs by inhalation two (2) times a day.  hydroCHLOROthiazide (HYDRODIURIL) 12.5 mg tablet Take 12.5 mg by mouth daily.  tiotropium bromide (SPIRIVA RESPIMAT) 2.5 mcg/actuation inhaler Take 2 Puffs by inhalation daily.  insulin glargine (LANTUS,BASAGLAR) 100 unit/mL (3 mL) inpn 35 Units by SubCUTAneous route nightly. (Patient taking differently: 30 Units by SubCUTAneous route nightly.) 28 Units 0    albuterol sulfate 90 mcg/actuation aepb Take 1 Puff by inhalation every four (4) hours.  (Patient taking differently: Take 1 Puff by inhalation every four (4) hours as needed.) 1 Inhaler 0    NOVOLOG FLEXPEN U-100 INSULIN 100 unit/mL inpn Continue home Sliding scale insulin as prior to admission (Patient taking differently: 1 Units by SubCUTAneous route three (3) times daily. If BG <100=0u  101-150=5u  151-250=8u  251-300=12u  >300 call MD  ) 1 Pen 0    omeprazole (PRILOSEC) 40 mg capsule Take 40 mg by mouth daily. Indications: gastroesophageal reflux disease      cpap machine kit by Does Not Apply route nightly. M.E.D.      OXYGEN-AIR DELIVERY SYSTEMS 2 L by Nasal route continuous. First Choice      aspirin delayed-release 81 mg tablet Take 81 mg by mouth daily.  famotidine (PEPCID) 20 mg tablet Take 20 mg by mouth two (2) times daily as needed for Other (reflux).  montelukast (SINGULAIR) 10 mg tablet Take 10 mg by mouth nightly.        Past History     Past Medical History:  Past Medical History:   Diagnosis Date    Asthma     Chronic lung disease     COPD     Cystocele, midline     Diabetes mellitus ([address]     GERD (gastroesophageal reflux disease)     Hidradenitis suppurativa     Hyperlipidemia     Hypertension     SHERRIE on CPAP     CPAP    Stress incontinence      Past Surgical History:  Past Surgical History:   Procedure Laterality Date    BREAST SURGERY PROCEDURE UNLISTED      Right breast benign tumor removal    HX APPENDECTOMY      HX CHOLECYSTECTOMY      HX DILATION AND CURETTAGE      Dysfunctional uterine bleeding, thought 2/2 fibroids    HX TUBAL LIGATION       Family History:  Family History   Problem Relation Age of Onset    Hypertension Mother     Stroke Mother     Breast Cancer Mother         Bilateral mastectomies    Cancer Mother         ovarian and breast    Heart Failure Mother     Heart Attack Father         2011    Heart Surgery Father         CABG    Heart Failure Father     COPD Sister         Heavy smoker    Cancer Sister         ovarian    Heart Failure Sister     Lung Disease Sister     Asthma Child     Cancer Maternal Aunt         pancreatic    Cancer Maternal Grandfather stomach     Social History:  Social History     Tobacco Use    Smoking status: Former Smoker     Packs/day: 1.00     Years: 30.00     Pack years: 30.00     Types: Cigarettes     Start date: 1966     Last attempt to quit: 2006     Years since quittin.1    Smokeless tobacco: Never Used    Tobacco comment: 1-1.5 packs per day   Substance Use Topics    Alcohol use: No    Drug use: No     Allergies: Allergies   Allergen Reactions    Ancef [Cefazolin] Hives    Contrast Agent [Iodine] Anaphylaxis, Shortness of Breath and Swelling     Needs pre-medication for IV contrast with Benadryl, Solu-Medrol    Fish Containing Products Anaphylaxis     Pt states she had a severe allergic reaction at 7 y/o.  Metformin Other (comments)     Abdominal pain, diarrhea.  Codeine Other (comments)     Altered mental status     Review of Systems     Review of Systems   Constitutional: Positive for fatigue. Negative for chills, diaphoresis and fever. HENT: Negative for congestion, ear pain, sinus pressure and sinus pain. Eyes: Negative for pain and visual disturbance. Respiratory: Positive for cough, chest tightness, shortness of breath and wheezing. Negative for choking and stridor. Cardiovascular: Positive for leg swelling. Negative for chest pain and palpitations. Gastrointestinal: Positive for abdominal pain and nausea. Negative for abdominal distention, constipation, diarrhea and vomiting. Endocrine: Negative for polydipsia and polyuria. Genitourinary: Negative for dysuria and flank pain. Musculoskeletal: Positive for myalgias. Negative for arthralgias. Skin: Negative for color change and pallor. No abdominal skin changes   Neurological: Positive for headaches. Negative for dizziness, weakness and numbness. Psychiatric/Behavioral: Negative for agitation, behavioral problems and confusion.      Physical Exam     Visit Vitals  /85   Pulse 92   Resp 22   SpO2 97%     Physical Exam Constitutional: She is oriented to person, place, and time. She appears well-developed and well-nourished. No distress. Uncomfortable but nontoxic in appearance   HENT:   Head: Normocephalic and atraumatic. Mouth/Throat: Oropharynx is clear and moist. No oropharyngeal exudate. NC in place   Eyes: Pupils are equal, round, and reactive to light. Conjunctivae and EOM are normal. Right eye exhibits no discharge. Left eye exhibits no discharge. No scleral icterus. Neck: Normal range of motion. Neck supple. No JVD present. No tracheal deviation present. No thyromegaly present. Cardiovascular: Normal rate, regular rhythm, normal heart sounds and intact distal pulses. Exam reveals no gallop and no friction rub. No murmur heard. Pulmonary/Chest: No accessory muscle usage. She is in respiratory distress (speaks in two-three word phrases). She has no decreased breath sounds. She has wheezes (inspiratory/expiratory diffusely). She has no rales. She exhibits no tenderness. Abdominal: Soft. She exhibits no distension and no mass. There is tenderness (diffuse). There is no rebound and no guarding. Pannus; easily reducible periumbilical hernia; no skin discoloration   Genitourinary:   Genitourinary Comments: deferred   Musculoskeletal: Normal range of motion. She exhibits no edema, tenderness or deformity. Lymphadenopathy:     She has no cervical adenopathy. Neurological: She is alert and oriented to person, place, and time. No cranial nerve deficit. She exhibits normal muscle tone. Coordination normal.   Skin: Skin is warm and dry. No rash noted. She is not diaphoretic. No erythema. No pallor. Psychiatric: She has a normal mood and affect.  Her behavior is normal. Judgment and thought content normal.     Diagnostic Study Results     Labs -  Recent Results (from the past 12 hour(s))   EKG, 12 LEAD, INITIAL    Collection Time: 10/14/19 10:05 AM   Result Value Ref Range    Ventricular Rate 73 BPM    Atrial Rate 73 BPM    P-R Interval 176 ms    QRS Duration 64 ms    Q-T Interval 374 ms    QTC Calculation (Bezet) 412 ms    Calculated P Axis 32 degrees    Calculated R Axis 31 degrees    Calculated T Axis 39 degrees    Diagnosis       Normal sinus rhythm  Low voltage QRS  Borderline ECG  When compared with ECG of 20-SEP-2019 18:19,  QRS duration has decreased  QT has shortened  Confirmed by Lucero Simms MD, Wendy Keys ([id number]) on 10/14/2019 11:59:06 AM     URINALYSIS W/ RFLX MICROSCOPIC    Collection Time: 10/14/19 11:40 AM   Result Value Ref Range    Color YELLOW      Appearance CLEAR      Specific gravity 1.028 1.005 - 1.030      pH (UA) 5.0 5.0 - 8.0      Protein NEGATIVE  NEG mg/dL    Glucose >1,000 (A) NEG mg/dL    Ketone NEGATIVE  NEG mg/dL    Bilirubin NEGATIVE  NEG      Blood NEGATIVE  NEG      Urobilinogen 0.2 0.2 - 1.0 EU/dL    Nitrites NEGATIVE  NEG      Leukocyte Esterase NEGATIVE  NEG     CBC WITH AUTOMATED DIFF    Collection Time: 10/14/19 12:00 PM   Result Value Ref Range    WBC 5.9 4.6 - 13.2 K/uL    RBC 4.63 4.20 - 5.30 M/uL    HGB 13.9 12.0 - 16.0 g/dL    HCT 40.1 35.0 - 45.0 %    MCV 86.6 74.0 - 97.0 FL    MCH 30.0 24.0 - 34.0 PG    MCHC 34.7 31.0 - 37.0 g/dL    RDW 14.0 11.6 - 14.5 %    PLATELET 913 144 - 500 K/uL    MPV 9.1 (L) 9.2 - 11.8 FL    NEUTROPHILS 53 40 - 73 %    LYMPHOCYTES 35 21 - 52 %    MONOCYTES 6 3 - 10 %    EOSINOPHILS 6 (H) 0 - 5 %    BASOPHILS 0 0 - 2 %    ABS. NEUTROPHILS 3.2 1.8 - 8.0 K/UL    ABS. LYMPHOCYTES 2.0 0.9 - 3.6 K/UL    ABS. MONOCYTES 0.3 0.05 - 1.2 K/UL    ABS. EOSINOPHILS 0.3 0.0 - 0.4 K/UL    ABS.  BASOPHILS 0.0 0.0 - 0.1 K/UL    DF AUTOMATED     METABOLIC PANEL, COMPREHENSIVE    Collection Time: 10/14/19 12:00 PM   Result Value Ref Range    Sodium 137 136 - 145 mmol/L    Potassium 4.0 3.5 - 5.5 mmol/L    Chloride 103 100 - 111 mmol/L    CO2 28 21 - 32 mmol/L    Anion gap 6 3.0 - 18 mmol/L    Glucose 228 (H) 74 - 99 mg/dL    BUN 13 7.0 - 18 MG/DL    Creatinine 0.92 0.6 - 1.3 MG/DL    BUN/Creatinine ratio 14 12 - 20      GFR est AA >60 >60 ml/min/1.73m2    GFR est non-AA >60 >60 ml/min/1.73m2    Calcium 9.4 8.5 - 10.1 MG/DL    Bilirubin, total 0.6 0.2 - 1.0 MG/DL    ALT (SGPT) 42 13 - 56 U/L    AST (SGOT) 16 10 - 38 U/L    Alk. phosphatase 106 45 - 117 U/L    Protein, total 8.2 6.4 - 8.2 g/dL    Albumin 3.9 3.4 - 5.0 g/dL    Globulin 4.3 (H) 2.0 - 4.0 g/dL    A-G Ratio 0.9 0.8 - 1.7     TROPONIN I    Collection Time: 10/14/19 12:00 PM   Result Value Ref Range    Troponin-I, QT <0.02 0.0 - 0.045 NG/ML   LIPASE    Collection Time: 10/14/19 12:00 PM   Result Value Ref Range    Lipase 98 73 - 393 U/L   MAGNESIUM    Collection Time: 10/14/19 12:00 PM   Result Value Ref Range    Magnesium 1.6 1.6 - 2.6 mg/dL   NT-PRO BNP    Collection Time: 10/14/19 12:00 PM   Result Value Ref Range    NT pro-BNP 41 0 - 900 PG/ML   POC LACTIC ACID    Collection Time: 10/14/19 12:06 PM   Result Value Ref Range    Lactic Acid (POC) 1.75 0.40 - 2.00 mmol/L   POC G3    Collection Time: 10/14/19 12:45 PM   Result Value Ref Range    Device: NASAL CANNULA      Flow rate (POC) 2 L/M    FIO2 (POC) 0.28 %    pH (POC) 7.381 7.35 - 7.45      pCO2 (POC) 48.6 (H) 35.0 - 45.0 MMHG    pO2 (POC) 75 (L) 80 - 100 MMHG    HCO3 (POC) 28.8 (H) 22 - 26 MMOL/L    sO2 (POC) 94 92 - 97 %    Base excess (POC) 3 mmol/L    Allens test (POC) YES      Site LEFT RADIAL      Specimen type (POC) ARTERIAL      Performed by Nel Shah        Radiologic Studies -   XR CHEST PA LAT   Final Result   Impression:   1. No acute process. 2.  Hyperinflated lungs suggestive of COPD. Medical Decision Making   I am the first provider for this patient. I reviewed the vital signs, available nursing notes, past medical history, past surgical history, family history and social history. Vital Signs-Reviewed the patient's vital signs.     EKG: by my interpretation NSR, NAD, intervals wnl rate 73, , QRS 64, QTc 412, no ST elevations/depression, no T wave changes    Records Reviewed: Nursing Notes, Old Medical Records, Previous electrocardiograms, Previous Radiology Studies and Previous Laboratory Studies (Time of Review: 11:56 AM)    ED Course: Progress Notes, Reevaluation, and Consults:  1140 Patient seen and evaluated at bedside. Initial orders/imaging placed/reviewed. Discussed case with Dr. Lola Currie. Paged RT for ABG. Communicated with RN plan for continous hour long albuterol neb. Also placed orders for solumedrol, mag and abx.    1227 Lactic acid 1.75. Patient saturating 97% on home O2 level (2L NC). Still with persistent cough and dyspnea. 1247 ABG completed, mixed venous sample per RT CO2 ~48. Will proceed with treatment plan. 1325 Patient reevaluated approx 20 minutes into continuous neb. Significant improvement in inspiratory wheezing, persistent expiratory wheezing. Patient satting 100% and appears more comfortable. 1413 Patient reevaluated. Worsening of inspiratory/expiratory wheezing after continuous nebs. Patient saturating 95% and appears fatigued. Will proceed with another 10 mg albuterol, 2.5 mg ipratropium and 10 KCl.    1420 Spoke with resident on call for [address]. Will admit to general medicine bed with telemetry monitoring. Provider Notes (Medical Decision Making):   MDM  Number of Diagnoses or Management Options  Acute exacerbation of chronic obstructive pulmonary disease (COPD) [facility]):   Diagnosis management comments:     61 yo  female with PMH COPD on home O2, IDDM2, HTN, HFpEF, SHERRIE on CPAP, GERD, allergic rhinitis presents with acute exacerbation of COPD. Patient's symptoms began two days ago including dyspnea, increased sputum production, change in sputum color. Patient has had numerous ED visits and numerous admissions for COPD exacerbations in the past year including an admission at the beginning of September.  Patient compliant with regimen of spiriva, advair and albuterol nebs; she also uses trilogy ventilator device every night. She is established with Dr. Kanika Simmons whom she has recently seen and also with [address] [place] [address] (Dr. Vahid Madden). Initial ABG showed no acidemia and mild hypercapneia to 48.6; patient does not historically retain CO2. Decided to hold off on BiPAP in favor of a continuous albuterol treatment x1hour. In addition, she received mag, solumedrol 60 mg and levofloxacin. Labworks significant for no leukocytosis/left shift, no metabolic disturbances, negative trop/lipase, negative BNP. ABG was a mixed venous sample likely explaining PO2 of 75 but minimal hypercapneia at 48. 6. UA demonstrated significant glycosuria. A CXR did not show an acute cardiopulmonary process. Patient did not make significant progress after continuous albuterol for an hour. She was ordered another 10 mg albuterol, 2.5 mg ipratropium and 10 mg KCl (K on arrival was 4.0). Separately, patient was complaining of abdominal pain. She has a known periumbilical hernia without any s/sx of strangulation/incarceration. She declined antiemetics. Patient will be admitted to [facility]. Amount and/or Complexity of Data Reviewed  Clinical lab tests: ordered and reviewed  Discuss the patient with other providers: yes Jessica Zelaya (ED Attending))  Independent visualization of images, tracings, or specimens: yes      Diagnosis     Clinical Impression:   1. Acute exacerbation of chronic obstructive pulmonary disease (COPD) ([facility])        Disposition: Admit to tele      Patient's Medications   Start Taking    No medications on file   Continue Taking    ALBUTEROL SULFATE (PROVENTIL;VENTOLIN) 2.5 MG/0.5 ML NEBU NEBULIZER SOLUTION    0.5 mL by Nebulization route four (4) times daily as needed for Wheezing. To be used with HyperSal nebulizer solution. ICD code: COPD J44.1    ALBUTEROL SULFATE 90 MCG/ACTUATION AEPB    Take 1 Puff by inhalation every four (4) hours.     ASPIRIN DELAYED-RELEASE 81 MG TABLET Take 81 mg by mouth daily. CETIRIZINE (ZYRTEC) 10 MG TABLET    Take 10 mg by mouth daily as needed for Allergies or Rhinitis. CPAP MACHINE KIT    by Does Not Apply route nightly. M.E.D. FAMOTIDINE (PEPCID) 20 MG TABLET    Take 20 mg by mouth two (2) times daily as needed for Other (reflux). FLUTICASONE PROPION-SALMETEROL (ADVAIR HFA) 230-21 MCG/ACTUATION INHALER    Take 2 Puffs by inhalation two (2) times a day. FLUTICASONE PROPIONATE (FLONASE ALLERGY RELIEF) 50 MCG/ACTUATION NASAL SPRAY    2 Sprays by Both Nostrils route daily. HYDROCHLOROTHIAZIDE (HYDRODIURIL) 12.5 MG TABLET    Take 12.5 mg by mouth daily. HYPER-SAL 3.5 % NEBU    USE 1 VIAL IN NEBULIZER TWICE DAILY    INSULIN GLARGINE (LANTUS,BASAGLAR) 100 UNIT/ML (3 ML) INPN    35 Units by SubCUTAneous route nightly. LISINOPRIL (PRINIVIL, ZESTRIL) 20 MG TABLET    Take 20 mg by mouth two (2) times a day. MONTELUKAST (SINGULAIR) 10 MG TABLET    Take 10 mg by mouth nightly. NOVOLOG FLEXPEN U-100 INSULIN 100 UNIT/ML INPN    Continue home Sliding scale insulin as prior to admission    OMEPRAZOLE (PRILOSEC) 40 MG CAPSULE    Take 40 mg by mouth daily. Indications: gastroesophageal reflux disease    OXYGEN-AIR DELIVERY SYSTEMS    2 L by Nasal route continuous. First Choice    PREDNISONE (DELTASONE) 10 MG TABLET    4 tablets every morning with breakfast for 10 days then 1 tablet every other morning with breakfast    PREDNISONE (DELTASONE) 10 MG TABLET    Take 30 mg by mouth daily (with breakfast). PREDNISONE (DELTASONE) 20 MG TABLET    Take 40 mg by mouth daily (with breakfast). PREDNISONE (DELTASONE) 20 MG TABLET    Take 20 mg by mouth daily (with breakfast). TIOTROPIUM BROMIDE (SPIRIVA RESPIMAT) 2.5 MCG/ACTUATION INHALER    Take 2 Puffs by inhalation daily.    These Medications have changed    No medications on file   Stop Taking    No medications on file     Silva Perez DO, PGY-2   [facility]   October 14, 2019, 11:56 AM

## 2019-10-14 NOTE — H&P
Admission History and Physical  [facility]      Patient: Momo Blair MRN: [id number]  CSN: [id number]    YOB: 1960  Age: 60 y.o. Sex: female      DOA: 10/14/2019       HPI:     Momo Blair is a 60 y.o. female with PMH of COPD on home O2, IDDM2, HTN, HFpEF, SHERRIE on CPAP, GERD, allergic rhinitis, now presenting with complaint of SOB. Pt started feeling bad a couple days ago, and it got worse overnight. Pt endorses productive cough with hemoptysis. Reports blood clots x2, about a quarter-sized. Pt's baseline productive cough is usually light yellow, and she states that her COPD exacerbations usually come with a cough productive of green mucus. Pt reports that she has had green mucus last night. Pt reports using trelegy machine every night, but recently she has had to use it during the day for shortness of breath. Pt sleeps in a recliner or with 3 pillows as it is uncomfortable for her to lay flat. She has required 12 breathing treatments at home. Pt states that breathing treatments help for a little while, but then she gets back to where she was before. Reports that grandson has strep throat and scarlet fever. No other sick contacts. Pt feels like she didn't get better since last admission. Last saw Dr. Jarrod Ha pulmonologist last month. Also follows with Dr. Layla Orlando for sleep apnea. No recent changes in COPD regimen. Quit smoking 14 years ago. Smoked 1.5-2ppd for 30 years. Denies etoh and illicit drug use. Lives with daughter and 2 grand-kids. At baseline walks without assistance, but hasn't been at her baseline in over a year. Uses a walker, last 2 days has required a wheelchair. Pt requesting portable oxygen. Uses 2L at home.     ED Course:   CBC, CMP wnl  Lipase 98  Mg 1.6  NT-pro BNP 41  ABG 7.3/48.6/75/29 on 2L NC  Lactic acid 1.75  Trop <0.02  UA glucose >1000  CXR  EKG  Albuterol nebulizer, solumedrol 60mg  Levofloxacin  Mg sulfate    Review of Systems   Constitutional: Negative for chills and fever. Gained 4lbs past week   HENT: Positive for congestion and sinus pain. Headache   Respiratory: Positive for cough and shortness of breath. Cardiovascular: Positive for orthopnea. Negative for chest pain, palpitations and leg swelling. Gastrointestinal: Positive for abdominal pain, diarrhea and nausea. Negative for blood in stool and vomiting. Hernia, chronic. Anorexia. No dysphagia/odynophagia   Genitourinary: Positive for frequency. Negative for dysuria. Skin: Negative for rash. Feels bloated   Neurological: Positive for headaches.        Past Medical History:   Diagnosis Date    Asthma     Chronic lung disease     COPD     Cystocele, midline     Diabetes mellitus (HCC)     GERD (gastroesophageal reflux disease)     Hidradenitis suppurativa     Hyperlipidemia     Hypertension     SHERRIE on CPAP     CPAP    Stress incontinence        Past Surgical History:   Procedure Laterality Date    BREAST SURGERY PROCEDURE UNLISTED      Right breast benign tumor removal    HX APPENDECTOMY      HX CHOLECYSTECTOMY      HX DILATION AND CURETTAGE      Dysfunctional uterine bleeding, thought 2/2 fibroids    HX TUBAL LIGATION         Family History   Problem Relation Age of Onset    Hypertension Mother     Stroke Mother     Breast Cancer Mother         Bilateral mastectomies    Cancer Mother         ovarian and breast    Heart Failure Mother     Heart Attack Father         2011    Heart Surgery Father         CABG    Heart Failure Father     COPD Sister         Heavy smoker    Cancer Sister         ovarian    Heart Failure Sister     Lung Disease Sister     Asthma Child     Cancer Maternal Aunt         pancreatic    Cancer Maternal Grandfather         stomach       Social History     Socioeconomic History    Marital status: LEGALLY      Spouse name: Not on file    Number of children: Not on file    Years of education: Not on file    Highest education level: Not on file   Tobacco Use    Smoking status: Former Smoker     Packs/day: 1.00     Years: 30.00     Pack years: 30.00     Types: Cigarettes     Start date: 1966     Last attempt to quit: 2006     Years since quittin.1    Smokeless tobacco: Never Used    Tobacco comment: 1-1.5 packs per day   Substance and Sexual Activity    Alcohol use: No    Drug use: No    Sexual activity: Never       Allergies   Allergen Reactions    Ancef [Cefazolin] Hives    Contrast Agent [Iodine] Anaphylaxis, Shortness of Breath and Swelling     Needs pre-medication for IV contrast with Benadryl, Solu-Medrol    Fish Containing Products Anaphylaxis     Pt states she had a severe allergic reaction at 9 y/o.  Metformin Other (comments)     Abdominal pain, diarrhea.  Codeine Other (comments)     Altered mental status       Prior to Admission Medications   Prescriptions Last Dose Informant Patient Reported? Taking? HYPER-SAL 3.5 % nebu   Yes No   Sig: USE 1 VIAL IN NEBULIZER TWICE DAILY   NOVOLOG FLEXPEN U-100 INSULIN 100 unit/mL inpn  Self No No   Sig: Continue home Sliding scale insulin as prior to admission   Patient taking differently: 1 Units by SubCUTAneous route three (3) times daily. If BG <100=0u  101-150=5u  151-250=8u  251-300=12u  >300 call MD     OXYGEN-AIR DELIVERY SYSTEMS   Yes No   Si L by Nasal route continuous. [facility]   albuterol sulfate (PROVENTIL;VENTOLIN) 2.5 mg/0.5 mL nebu nebulizer solution   No No   Si.5 mL by Nebulization route four (4) times daily as needed for Wheezing. To be used with HyperSal nebulizer solution. ICD code: COPD J44.1   albuterol sulfate 90 mcg/actuation aepb   No No   Sig: Take 1 Puff by inhalation every four (4) hours. Patient taking differently: Take 1 Puff by inhalation every four (4) hours as needed.    aspirin delayed-release 81 mg tablet   Yes No   Sig: Take 81 mg by mouth daily.   cetirizine (ZYRTEC) 10 mg tablet   Yes No   Sig: Take 10 mg by mouth daily as needed for Allergies or Rhinitis. cpap machine kit   Yes No   Sig: by Does Not Apply route nightly. M.E.D.   famotidine (PEPCID) 20 mg tablet   Yes No   Sig: Take 20 mg by mouth two (2) times daily as needed for Other (reflux). fluticasone propion-salmeterol (ADVAIR HFA) 230-21 mcg/actuation inhaler   Yes No   Sig: Take 2 Puffs by inhalation two (2) times a day. fluticasone propionate (FLONASE ALLERGY RELIEF) 50 mcg/actuation nasal spray   Yes No   Si Sprays by Both Nostrils route daily. hydroCHLOROthiazide (HYDRODIURIL) 12.5 mg tablet   Yes No   Sig: Take 12.5 mg by mouth daily. insulin glargine (LANTUS,BASAGLAR) 100 unit/mL (3 mL) inpn   No No   Si Units by SubCUTAneous route nightly. Patient taking differently: 30 Units by SubCUTAneous route nightly. lisinopril (PRINIVIL, ZESTRIL) 20 mg tablet   Yes No   Sig: Take 20 mg by mouth two (2) times a day. montelukast (SINGULAIR) 10 mg tablet   Yes No   Sig: Take 10 mg by mouth nightly. omeprazole (PRILOSEC) 40 mg capsule   Yes No   Sig: Take 40 mg by mouth daily. Indications: gastroesophageal reflux disease   predniSONE (DELTASONE) 10 mg tablet   No No   Si tablets every morning with breakfast for 10 days then 1 tablet every other morning with breakfast   predniSONE (DELTASONE) 10 mg tablet   No No   Sig: Take 30 mg by mouth daily (with breakfast). predniSONE (DELTASONE) 20 mg tablet   No No   Sig: Take 40 mg by mouth daily (with breakfast). predniSONE (DELTASONE) 20 mg tablet   No No   Sig: Take 20 mg by mouth daily (with breakfast). tiotropium bromide (SPIRIVA RESPIMAT) 2.5 mcg/actuation inhaler   Yes No   Sig: Take 2 Puffs by inhalation daily.       Facility-Administered Medications: None       Physical Exam:     Patient Vitals for the past 24 hrs:   Pulse Resp BP SpO2   10/14/19 1221 -- -- -- 97 %   10/14/19 0952 92 22 159/85 92 % Physical Exam   Constitutional: She is oriented to person, place, and time. She appears distressed. HENT:   Head: Normocephalic and atraumatic. Eyes: Pupils are equal, round, and reactive to light. EOM are normal.   Neck: Normal range of motion. No JVD present. No tracheal deviation present. Cardiovascular: Normal rate, regular rhythm and intact distal pulses. Murmur heard. Pulmonary/Chest: She is in respiratory distress. She has wheezes. Expiratory wheezing, coarse breath sounds, increased respiratory effort. Conversationally dyspneic on 2L NC   Abdominal: Soft. Bowel sounds are normal. She exhibits distension. There is tenderness. There is no rebound and no guarding. Epigastric tenderness   Musculoskeletal: Normal range of motion. She exhibits no edema. Neurological: She is alert and oriented to person, place, and time. No cranial nerve deficit. Skin: Skin is warm and dry. She is not diaphoretic. Psychiatric: Affect normal.       IMAGING:   Xr Chest Pa Lat    Result Date: 10/14/2019  Impression: 1. No acute process. 2.  Hyperinflated lungs suggestive of COPD.       Recent Results (from the past 12 hour(s))   EKG, 12 LEAD, INITIAL    Collection Time: 10/14/19 10:05 AM   Result Value Ref Range    Ventricular Rate 73 BPM    Atrial Rate 73 BPM    P-R Interval 176 ms    QRS Duration 64 ms    Q-T Interval 374 ms    QTC Calculation (Bezet) 412 ms    Calculated P Axis 32 degrees    Calculated R Axis 31 degrees    Calculated T Axis 39 degrees    Diagnosis       Normal sinus rhythm  Low voltage QRS  Borderline ECG  When compared with ECG of 20-SEP-2019 18:19,  QRS duration has decreased  QT has shortened  Confirmed by Aliyah Perez MD, [facility] ([id number] on 10/14/2019 11:59:06 AM     URINALYSIS W/ RFLX MICROSCOPIC    Collection Time: 10/14/19 11:40 AM   Result Value Ref Range    Color YELLOW      Appearance CLEAR      Specific gravity 1.028 1.005 - 1.030      pH (UA) 5.0 5.0 - 8.0      Protein NEGATIVE  NEG mg/dL    Glucose >1,000 (A) NEG mg/dL    Ketone NEGATIVE  NEG mg/dL    Bilirubin NEGATIVE  NEG      Blood NEGATIVE  NEG      Urobilinogen 0.2 0.2 - 1.0 EU/dL    Nitrites NEGATIVE  NEG      Leukocyte Esterase NEGATIVE  NEG     CBC WITH AUTOMATED DIFF    Collection Time: 10/14/19 12:00 PM   Result Value Ref Range    WBC 5.9 4.6 - 13.2 K/uL    RBC 4.63 4.20 - 5.30 M/uL    HGB 13.9 12.0 - 16.0 g/dL    HCT 40.1 35.0 - 45.0 %    MCV 86.6 74.0 - 97.0 FL    MCH 30.0 24.0 - 34.0 PG    MCHC 34.7 31.0 - 37.0 g/dL    RDW 14.0 11.6 - 14.5 %    PLATELET 617 001 - 982 K/uL    MPV 9.1 (L) 9.2 - 11.8 FL    NEUTROPHILS 53 40 - 73 %    LYMPHOCYTES 35 21 - 52 %    MONOCYTES 6 3 - 10 %    EOSINOPHILS 6 (H) 0 - 5 %    BASOPHILS 0 0 - 2 %    ABS. NEUTROPHILS 3.2 1.8 - 8.0 K/UL    ABS. LYMPHOCYTES 2.0 0.9 - 3.6 K/UL    ABS. MONOCYTES 0.3 0.05 - 1.2 K/UL    ABS. EOSINOPHILS 0.3 0.0 - 0.4 K/UL    ABS. BASOPHILS 0.0 0.0 - 0.1 K/UL    DF AUTOMATED     METABOLIC PANEL, COMPREHENSIVE    Collection Time: 10/14/19 12:00 PM   Result Value Ref Range    Sodium 137 136 - 145 mmol/L    Potassium 4.0 3.5 - 5.5 mmol/L    Chloride 103 100 - 111 mmol/L    CO2 28 21 - 32 mmol/L    Anion gap 6 3.0 - 18 mmol/L    Glucose 228 (H) 74 - 99 mg/dL    BUN 13 7.0 - 18 MG/DL    Creatinine 0.92 0.6 - 1.3 MG/DL    BUN/Creatinine ratio 14 12 - 20      GFR est AA >60 >60 ml/min/1.73m2    GFR est non-AA >60 >60 ml/min/1.73m2    Calcium 9.4 8.5 - 10.1 MG/DL    Bilirubin, total 0.6 0.2 - 1.0 MG/DL    ALT (SGPT) 42 13 - 56 U/L    AST (SGOT) 16 10 - 38 U/L    Alk.  phosphatase 106 45 - 117 U/L    Protein, total 8.2 6.4 - 8.2 g/dL    Albumin 3.9 3.4 - 5.0 g/dL    Globulin 4.3 (H) 2.0 - 4.0 g/dL    A-G Ratio 0.9 0.8 - 1.7     TROPONIN I    Collection Time: 10/14/19 12:00 PM   Result Value Ref Range    Troponin-I, QT <0.02 0.0 - 0.045 NG/ML   LIPASE    Collection Time: 10/14/19 12:00 PM   Result Value Ref Range    Lipase 98 73 - 393 U/L   MAGNESIUM    Collection Time: 10/14/19 12:00 PM   Result Value Ref Range    Magnesium 1.6 1.6 - 2.6 mg/dL   NT-PRO BNP    Collection Time: 10/14/19 12:00 PM   Result Value Ref Range    NT pro-BNP 41 0 - 900 PG/ML   POC LACTIC ACID    Collection Time: 10/14/19 12:06 PM   Result Value Ref Range    Lactic Acid (POC) 1.75 0.40 - 2.00 mmol/L   POC G3    Collection Time: 10/14/19 12:45 PM   Result Value Ref Range    Device: NASAL CANNULA      Flow rate (POC) 2 L/M    FIO2 (POC) 0.28 %    pH (POC) 7.381 7.35 - 7.45      pCO2 (POC) 48.6 (H) 35.0 - 45.0 MMHG    pO2 (POC) 75 (L) 80 - 100 MMHG    HCO3 (POC) 28.8 (H) 22 - 26 MMOL/L    sO2 (POC) 94 92 - 97 %    Base excess (POC) 3 mmol/L    Allens test (POC) YES      Site LEFT RADIAL      Specimen type (POC) ARTERIAL      Performed by Edyta Lane          Assessment/Plan:   Micha De Dios is a 60 y.o. female with PMH of COPD on home O2, IDDM2, HTN, HFpEF, SHERRIE on CPAP, GERD, allergic rhinitis, now presenting with complaint of SOB.     COPD Exacerbation  -previous hospitalization 8/30/19-9/5/19 for COPD exacerbation  -home meds: prednisone 10mg every other day, albuterol neb, spiriva,  -home oxygen 2L when active, trelegy nightly  Plan  -titrate oxygen to goal oxygen saturation of 88-92%  -influenza testing  -duoneb scheduled q4h  -levofloxacin 750mg IV  -solumedrol 40mg q6h  -6 min walk test before discharge    Subjective Dysphagia  -speech swallow eval    SHERRIE/pulm htn  -trelegy nightly    Insuline dependent Type 2 Diabetes  -home lantus 35u qhs, novolog SSI  -monitor blood glucose while on steroids    Hypertension, HFpEF  -home lisinopril 20mg bid, HCTZ 12.5mg  -echo 7/7/18 EF 66%    GERD  -pepcid 20mg    Allergic Rhinitis  -home zyrtec, flonase, singulair 10mg      Diet: diabetic  DVT Prophylaxis: lovenox  Code Status: Full Code  Point of Contact: Swapna Branch, daughter, [phone]    Disposition and anticipated LOS: +/- 2 midnights    ROSEANN Poole 54, PGY-1  10/14/19 3:10 PM             Senior Addendum to History and Physical  [facility]        Patient: Latonya Etienne MRN: [id number]  CSN: [id number]    YOB: 1960  Age: 60 y.o. Sex: female       DOA: 10/14/2019       HPI:      Latonya Etienne is a 60 y.o. female with PMH COPD on home O2, IDDM2, HTN, HFpEF, SHERRIE on CPAP, GERD, allergic rhinitis, now presenting with complaint of SOB and cough. Pt reports 2 days of worsening dyspnea, cough productive of green sputum. Pt also reports using home albuterol q1h with no relief. Pt reports receiving about 12 treatments in the last day.      Notes that she normally only uses 2L O2 when active and normally only uses trelegy at night.       ED Course: CBC unremarkable  CMP unremarkable  Lipase: 98  M.6  BNP: 41  Lactate: 1.75  Trop: <0.02  UA: glucose: >1000  EKG: NSR, low voltage QRS  CXR: No acute process. Hyperinflated lungs suggestive of COPD.     Physical Exam:      Physical Exam:  General:  Alert and Responsive and in No acute distress. HEENT: Conjunctiva pink, sclera anicteric. PERRL. EOMI. Pharynx moist, nonerythematous. Moist mucous membranes. Thyroid not enlarged, no nodules. No cervical, supraclavicular, occipital or submandibular lymphadenopathy. No other gross abnormalities present. CV:  RRR, no murmurs. PMI not displaced. No visible pulsations or thrills. No carotid bruits. RESP:  Unable to say more than 2-3 words without having to stop to catch breath. Diffuse wheezing bilaterally. Accessory muscle usage. Equal expansion bilaterally. ABD:  Soft, RUQ tenderness, nondistended. Normoactive bowel sounds. No hepatosplenomegaly. Umbilical hernia easily reduced. RECTAL:  See intern note. MS:  No joint deformity or instability. No atrophy. Neuro:  5/5 strength bilateral upper extremities and lower extremities. CN II-XII intact. Sensation grossly intact. A+Ox3. Ext:  No edema.   2+ radial and dp pulses bilaterally. Skin:  Excoriations on LE bilaterally. Good turgor.        Assessment/Plan:   60 y.o. female with PMH COPD on home O2, IDDM2, HTN, HFpEF, SHERRIE on CPAP, GERD, allergic rhinitis, now admitted with COPD exacerbation.     COPD exacerbation patient with severe COPD on 2L of O2 at home when active + trelegy at night and PRN. Follows with Dr. Iain Plaza as an outpatient. Slight tachycardia and tachypnea on arrival resolved following appropriate respiratory treatments. CXR unremarkable. Mixed BG fairly unremarkable pH 7.381, pCO2 48.6, pO2 75. S/p 10mg albuterol, solumedrol 60mg x1 and magnesium 2g x 1 in the ER.   Many admissions within the last year with the most recent 8/31/2019.   - Fu blood cx, respiratory cx  - FU repeat lactic acid   - Levaquin 750mg QD - reviewed available data on up to date at higher risk for pseudomonas infection/colonization given recent hospitalization and abx use and levaquin is an appropriate abx for this indication   - Scheduled duonebs Q4H  - Solumedrol 60mg BID for now, suggest a slow taper.   - Home trelegy PRN and QHS   - Goal SpO2 should be between 88-92% in this patient with severe COPD  - Continue home singular  - Hold home spiriva while on scheduled duoneb treatments  - Sub home advair per pharmacy recs  - Daily CBC, BMP   - PT/OT/CM/Speech  - Consider pulm consult if not improving     For additional problem list, assessment, and plan see intern note.     Ori Valdovinos MD, PGY-3  [facility]  10/14/19 2:22 PM

## 2019-10-15 LAB
ANION GAP SERPL CALC-SCNC: 6 MMOL/L (ref 3–18)
BUN SERPL-MCNC: 18 MG/DL (ref 7–18)
BUN/CREAT SERPL: 18 (ref 12–20)
CALCIUM SERPL-MCNC: 9.2 MG/DL (ref 8.5–10.1)
CHLORIDE SERPL-SCNC: 101 MMOL/L (ref 100–111)
CO2 SERPL-SCNC: 26 MMOL/L (ref 21–32)
CREAT SERPL-MCNC: 1 MG/DL (ref 0.6–1.3)
ERYTHROCYTE [DISTWIDTH] IN BLOOD BY AUTOMATED COUNT: 14 % (ref 11.6–14.5)
EST. AVERAGE GLUCOSE BLD GHB EST-MCNC: 192 MG/DL
FLUAV AG NPH QL IA: NEGATIVE
FLUBV AG NOSE QL IA: NEGATIVE
GLUCOSE BLD STRIP.AUTO-MCNC: 324 MG/DL (ref 70–110)
GLUCOSE BLD STRIP.AUTO-MCNC: 325 MG/DL (ref 70–110)
GLUCOSE BLD STRIP.AUTO-MCNC: 351 MG/DL (ref 70–110)
GLUCOSE BLD STRIP.AUTO-MCNC: 360 MG/DL (ref 70–110)
GLUCOSE SERPL-MCNC: 324 MG/DL (ref 74–99)
HBA1C MFR BLD: 8.3 % (ref 4.2–5.6)
HCT VFR BLD AUTO: 36.4 % (ref 35–45)
HGB BLD-MCNC: 12.4 G/DL (ref 12–16)
MCH RBC QN AUTO: 29.7 PG (ref 24–34)
MCHC RBC AUTO-ENTMCNC: 34.1 G/DL (ref 31–37)
MCV RBC AUTO: 87.3 FL (ref 74–97)
PLATELET # BLD AUTO: 282 K/UL (ref 135–420)
PMV BLD AUTO: 8.9 FL (ref 9.2–11.8)
POTASSIUM SERPL-SCNC: 4.3 MMOL/L (ref 3.5–5.5)
RBC # BLD AUTO: 4.17 M/UL (ref 4.2–5.3)
SODIUM SERPL-SCNC: 133 MMOL/L (ref 136–145)
WBC # BLD AUTO: 8.8 K/UL (ref 4.6–13.2)

## 2019-10-15 PROCEDURE — 92610 EVALUATE SWALLOWING FUNCTION: CPT

## 2019-10-15 PROCEDURE — 97165 OT EVAL LOW COMPLEX 30 MIN: CPT

## 2019-10-15 PROCEDURE — 77010033678 HC OXYGEN DAILY

## 2019-10-15 PROCEDURE — 74011250637 HC RX REV CODE- 250/637: Performed by: STUDENT IN AN ORGANIZED HEALTH CARE EDUCATION/TRAINING PROGRAM

## 2019-10-15 PROCEDURE — 74011636637 HC RX REV CODE- 636/637: Performed by: FAMILY MEDICINE

## 2019-10-15 PROCEDURE — 82962 GLUCOSE BLOOD TEST: CPT

## 2019-10-15 PROCEDURE — 87804 INFLUENZA ASSAY W/OPTIC: CPT

## 2019-10-15 PROCEDURE — 97530 THERAPEUTIC ACTIVITIES: CPT

## 2019-10-15 PROCEDURE — 97161 PT EVAL LOW COMPLEX 20 MIN: CPT

## 2019-10-15 PROCEDURE — 92526 ORAL FUNCTION THERAPY: CPT

## 2019-10-15 PROCEDURE — 94762 N-INVAS EAR/PLS OXIMTRY CONT: CPT

## 2019-10-15 PROCEDURE — 74011000250 HC RX REV CODE- 250: Performed by: STUDENT IN AN ORGANIZED HEALTH CARE EDUCATION/TRAINING PROGRAM

## 2019-10-15 PROCEDURE — 74011250636 HC RX REV CODE- 250/636: Performed by: STUDENT IN AN ORGANIZED HEALTH CARE EDUCATION/TRAINING PROGRAM

## 2019-10-15 PROCEDURE — 83036 HEMOGLOBIN GLYCOSYLATED A1C: CPT

## 2019-10-15 PROCEDURE — 85027 COMPLETE CBC AUTOMATED: CPT

## 2019-10-15 PROCEDURE — 80048 BASIC METABOLIC PNL TOTAL CA: CPT

## 2019-10-15 PROCEDURE — 94640 AIRWAY INHALATION TREATMENT: CPT

## 2019-10-15 PROCEDURE — 65660000000 HC RM CCU STEPDOWN

## 2019-10-15 PROCEDURE — 36415 COLL VENOUS BLD VENIPUNCTURE: CPT

## 2019-10-15 PROCEDURE — 74011636637 HC RX REV CODE- 636/637: Performed by: STUDENT IN AN ORGANIZED HEALTH CARE EDUCATION/TRAINING PROGRAM

## 2019-10-15 RX ORDER — INSULIN GLARGINE 100 [IU]/ML
30 INJECTION, SOLUTION SUBCUTANEOUS
Status: DISCONTINUED | OUTPATIENT
Start: 2019-10-15 | End: 2019-10-15

## 2019-10-15 RX ORDER — INSULIN GLARGINE 100 [IU]/ML
35 INJECTION, SOLUTION SUBCUTANEOUS
Status: DISCONTINUED | OUTPATIENT
Start: 2019-10-15 | End: 2019-10-16

## 2019-10-15 RX ADMIN — INSULIN LISPRO 12 UNITS: 100 INJECTION, SOLUTION INTRAVENOUS; SUBCUTANEOUS at 10:16

## 2019-10-15 RX ADMIN — IPRATROPIUM BROMIDE AND ALBUTEROL SULFATE 3 ML: .5; 3 SOLUTION RESPIRATORY (INHALATION) at 20:20

## 2019-10-15 RX ADMIN — INSULIN GLARGINE 35 UNITS: 100 INJECTION, SOLUTION SUBCUTANEOUS at 21:19

## 2019-10-15 RX ADMIN — TIOTROPIUM BROMIDE 18 MCG: 18 CAPSULE ORAL; RESPIRATORY (INHALATION) at 08:53

## 2019-10-15 RX ADMIN — IPRATROPIUM BROMIDE AND ALBUTEROL SULFATE 3 ML: .5; 3 SOLUTION RESPIRATORY (INHALATION) at 23:57

## 2019-10-15 RX ADMIN — INSULIN LISPRO 15 UNITS: 100 INJECTION, SOLUTION INTRAVENOUS; SUBCUTANEOUS at 12:45

## 2019-10-15 RX ADMIN — ACETAMINOPHEN 650 MG: 325 TABLET ORAL at 14:38

## 2019-10-15 RX ADMIN — IPRATROPIUM BROMIDE AND ALBUTEROL SULFATE 3 ML: .5; 3 SOLUTION RESPIRATORY (INHALATION) at 08:28

## 2019-10-15 RX ADMIN — LISINOPRIL 20 MG: 20 TABLET ORAL at 18:09

## 2019-10-15 RX ADMIN — METHYLPREDNISOLONE SODIUM SUCCINATE 60 MG: 40 INJECTION, POWDER, FOR SOLUTION INTRAMUSCULAR; INTRAVENOUS at 10:17

## 2019-10-15 RX ADMIN — PANTOPRAZOLE SODIUM 40 MG: 40 TABLET, DELAYED RELEASE ORAL at 10:17

## 2019-10-15 RX ADMIN — LISINOPRIL 20 MG: 20 TABLET ORAL at 10:17

## 2019-10-15 RX ADMIN — FLUTICASONE PROPIONATE 2 SPRAY: 50 SPRAY, METERED NASAL at 10:18

## 2019-10-15 RX ADMIN — HYDROCHLOROTHIAZIDE 12.5 MG: 25 TABLET ORAL at 10:16

## 2019-10-15 RX ADMIN — ENOXAPARIN SODIUM 40 MG: 40 INJECTION SUBCUTANEOUS at 18:10

## 2019-10-15 RX ADMIN — ASPIRIN 81 MG: 81 TABLET, COATED ORAL at 10:17

## 2019-10-15 RX ADMIN — LEVOFLOXACIN 500 MG: 250 TABLET, FILM COATED ORAL at 14:38

## 2019-10-15 RX ADMIN — IPRATROPIUM BROMIDE AND ALBUTEROL SULFATE 3 ML: .5; 3 SOLUTION RESPIRATORY (INHALATION) at 12:49

## 2019-10-15 RX ADMIN — INSULIN LISPRO 15 UNITS: 100 INJECTION, SOLUTION INTRAVENOUS; SUBCUTANEOUS at 18:09

## 2019-10-15 RX ADMIN — INSULIN LISPRO 12 UNITS: 100 INJECTION, SOLUTION INTRAVENOUS; SUBCUTANEOUS at 21:25

## 2019-10-15 RX ADMIN — METHYLPREDNISOLONE SODIUM SUCCINATE 60 MG: 40 INJECTION, POWDER, FOR SOLUTION INTRAMUSCULAR; INTRAVENOUS at 21:19

## 2019-10-15 NOTE — PROGRESS NOTES
Problem: Dysphagia (Adult)  Goal: *Acute Goals and Plan of Care (Insert Text)  Description  Patient will:  1. Tolerate PO trials with 0 s/s overt distress in 4/5 trials - met  2. Utilize compensatory swallow strategies/maneuvers (decrease bite/sip, size/rate, alt. liq/sol) with min cues in 4/5 trials - met    Rec:     Reg solid with thin liquids  Breaks with PO as needed  Aspiration precautions  HOB >45 during po intake, remain >30 for 30-45 minutes after po   Small bites/sips; alternate liquid/solid with slow feeding rate   Oral care TID  Meds per pt preference         Outcome: Resolved/Met    SPEECH LANGUAGE PATHOLOGY BEDSIDE SWALLOW EVALUATION/TREATMENT AND DISCHARGE    Patient: Marybel Whitt (49 y.o. female)  Date: 10/15/2019  Primary Diagnosis: Acute exacerbation of COPD with asthma ([facility] [address] [J44.1, X39.058]  COPD with acute exacerbation ([facility] [address] [J44.1]        Precautions: aspiration     PLOF: As per H&P    ASSESSMENT :  Based on the objective data described below, the patient presents with oropharyngeal swallow fxn essentially WFL. Pt with shortness of breath at baseline, during phonatory tasks, and during PO trials. Pt familiar to this service and completed a MBS in June 2018 with normal oropharyngeal swallow fxn and no aspiration/penetration observed. Pt tolerated reg solid, puree, and thin liquid trials at bedside this AM with no overt s/sx of aspiration. She was able to recall compensatory strategies to take her time elizabeth. Laryngeal elevation was functional/timely to palpation. Rec reg solid diet with thin liquids, aspiration precautions, oral care TID, and meds as tolerated. TREATMENT :  Skilled therapy initiated; Educated pt on MBS results, aspiration precautions, and importance of compensatory swallow techniques to decrease aspiration risk (decrease rate of intake & sip/bite size, frequent breaks with PO, upright @HOB for all po intake and ~30 minutes after po); verbalized comprehension.  No further skilled SLP services are indicated at this time. Please re-consult if needed. PLAN :  Recommendations and Planned Interventions: See above  Frequency/Duration: x eval/tx   Discharge Recommendations: None     SUBJECTIVE:   Patient stated I remember seeing you last time. I'm still glad we did that test to make sure everything was alright. Nothing has changed. OBJECTIVE:     Past Medical History:   Diagnosis Date    Asthma     Chronic lung disease     COPD     Cystocele, midline     Diabetes mellitus (HCC)     GERD (gastroesophageal reflux disease)     Hidradenitis suppurativa     Hyperlipidemia     Hypertension     SHERRIE on CPAP     CPAP    Stress incontinence      Past Surgical History:   Procedure Laterality Date    BREAST SURGERY PROCEDURE UNLISTED      Right breast benign tumor removal    HX APPENDECTOMY      HX CHOLECYSTECTOMY      HX DILATION AND CURETTAGE      Dysfunctional uterine bleeding, thought 2/2 fibroids    HX TUBAL LIGATION       Prior Level of Function/Home Situation: see below  Home Situation  Home Environment: Private residence  One/Two Story Residence: One story  Living Alone: No  Support Systems: Family member(s), Friends \ neighbors  Patient Expects to be Discharged to[de-identified] Private residence  Current DME Used/Available at Home: CPAP    Diet prior to admission: reg solid with thin  Current Diet:  reg solid with thin      Cognitive and Communication Status:  Neurologic State: Alert  Orientation Level: Oriented X4  Cognition: Follows commands  Oral Assessment:  Oral Assessment  Labial: No impairment  Dentition: Natural;Intact  Oral Hygiene: ADEQUATE  Lingual: No impairment  Velum: No impairment  Mandible: No impairment  P.O. Trials:  Patient Position: 90 at side of bed  Vocal quality prior to P.O.: No impairment  Consistency Presented: Thin liquid; Solid;Puree  How Presented: Self-fed/presented;Cup/sip;Spoon;Straw  Bolus Acceptance: No impairment  Bolus Formation/Control: No impairment  Propulsion: No impairment  Oral Residue: None  Initiation of Swallow: No impairment  Laryngeal Elevation: Functional  Aspiration Signs/Symptoms: None  Pharyngeal Phase Characteristics: No impairment, issues, or problems   Effective Modifications: None  Cues for Modifications: None     Oral Phase Severity: No impairment  Pharyngeal Phase Severity : No impairment    PAIN:  Start of Eval: 0  End of Eval: 0     After treatment:   ?            Patient left in no apparent distress sitting up in chair  ? Patient left in no apparent distress in bed  ? Call bell left within reach  ? Nursing notified  ? Family present  ? Caregiver present  ? Bed alarm activated    COMMUNICATION/EDUCATION:   ?            Aspiration precautions; swallow safety; compensatory techniques. ?            Patient/family have participated as able in goal setting and plan of care. ? Posted safety precautions in patient's room.     Thank you for this referral.    Darnell Echavarria M.S. CCC-SLP/L  Speech-Language Pathologist

## 2019-10-15 NOTE — ROUTINE PROCESS
Bedside and Verbal shift change report given to [employer] (oncoming nurse) by Yudelka Pleitez RN (offgoing nurse). Report given with SBAR, Kardex, Intake/Output, MAR, Accordion and Recent Results.

## 2019-10-15 NOTE — ROUTINE PROCESS
TRANSFER - IN REPORT:    Verbal report received from Jovita Kitchen (name) on Irving Simmons  being received from ED(unit) for routine progression of care      Report consisted of patients Situation, Background, Assessment and   Recommendations(SBAR). Information from the following report(s) Recent Results was reviewed with the receiving nurse. Opportunity for questions and clarification was provided. Assessment completed upon patients arrival to unit and care assumed.      Primary Nurse Juana Noel RN and JOSÉ MIGUEL CISNEROS performed a dual skin assessment on this patient No impairment noted  Viktor score is 21

## 2019-10-15 NOTE — DIABETES MGMT
NUTRITIONAL ASSESSMENT GLYCEMIC CONTROL/ PLAN OF CARE     Lissette Guerra           60 y.o.           10/14/2019                 1. Acute exacerbation of chronic obstructive pulmonary disease (COPD) (HCC)       INTERVENTIONS/PLAN:   Recommend increasing Lantus insulin to 30 units nightly   Consider addition of prandial Lispro insulin, 3 units TID with meals    ASSESSMENT:   Pt is a 60year old female with a past medical history significant for COPD, type 2 diabetes, SHERRIE, hypertension, and GERD who presented with SOB. Blood glucose elevated above targets, pt is receiving solu-medrol 60 mg every 12 hours. Pt has order for basal and correctional insulin coverage. Pt also seen during recent admissions. Pt has information on free outpatient diabetes education classes. Currently receiving a diabetic diet.      Diabetes Management:   10/14/2019 22:02 10/15/2019 06:42   396 (H) 324 (H)   Within target range (non-ICU: <140; ICU<180): [] Yes   [x]  No    Current Insulin regimen:   Lantus insulin 17 units every bedtime  Correctional Lispro insulin 4 times daily ACHS (very insulin resistant)  Home medication/insulin regimen:   Lantus insulin 30 units nightly   Novolog insulin per sliding scale  HbA1c: 8.3% (esitmated average glucose of 192 mg/dL)  Adequate glycemic control PTA:  [] Yes  [x] No     SUBJECTIVE/OBJECTIVE:     Diet: Diabetic consistent carbohydrate     Medications: [x] Reviewed     Most Recent POC Glucose:   Recent Labs     10/15/19  0410 10/14/19  1200   * 228*      Labs:   Lab Results   Component Value Date/Time    Hemoglobin A1c 8.3 (H) 10/15/2019 04:10 AM     Lab Results   Component Value Date/Time    Sodium 133 (L) 10/15/2019 04:10 AM    Potassium 4.3 10/15/2019 04:10 AM    Chloride 101 10/15/2019 04:10 AM    CO2 26 10/15/2019 04:10 AM    Anion gap 6 10/15/2019 04:10 AM    Glucose 324 (H) 10/15/2019 04:10 AM    BUN 18 10/15/2019 04:10 AM    Creatinine 1.00 10/15/2019 04:10 AM    Calcium 9.2 10/15/2019 04:10 AM    Magnesium 1.6 10/14/2019 12:00 PM    Phosphorus 3.1 01/19/2019 06:51 AM    Albumin 3.9 10/14/2019 12:00 PM     Anthropometrics: Wt Readings from Last 1 Encounters:   10/14/19 113.4 kg (250 lb)      Ht Readings from Last 1 Encounters:   09/20/19 5' 3\" (1.6 m)     Estimated Nutrition Needs:  5552-7022 Kcal/day,  grams protein/day  Based on:   [x]Actual BW    []   IBW   []  Adjusted BW      Nutrition Diagnoses:    Altered nutrition related lab value related to diabetes as evidenced by Hemoglobin A1c of 8.3%  Nutrition Interventions: diabetic diet, coordination of care   Goal: Blood glucose will be within target range of  mg/dL by 10/18/19     Nutrition Monitoring and Evaluation    []     Monitor po intake on meal rounds  [x]     Continue inpatient monitoring and intervention  []     Other:    Valentin Pritchett, 65 [address], [address]

## 2019-10-15 NOTE — ED NOTES
TRANSFER - OUT REPORT:    Verbal report given to Da Mendoza RN (name) on Amilcar Young  being transferred to [address] (unit) for routine progression of care       Report consisted of patients Situation, Background, Assessment and   Recommendations(SBAR). Information from the following report(s) SBAR, ED Summary and MAR was reviewed with the receiving nurse. Lines:   Peripheral IV 10/14/19 Right Hand (Active)   Site Assessment Clean, dry, & intact 10/14/2019 12:19 PM   Phlebitis Assessment 0 10/14/2019 12:19 PM   Infiltration Assessment 0 10/14/2019 12:19 PM   Dressing Status Clean, dry, & intact 10/14/2019 12:19 PM   Dressing Type Transparent 10/14/2019 12:19 PM   Hub Color/Line Status Blue;Flushed 10/14/2019 12:19 PM   Action Taken Blood drawn 10/14/2019 12:19 PM       Peripheral IV 75/95/25 Left Cephalic (Active)   Site Assessment Clean, dry, & intact 10/14/2019  1:16 PM   Phlebitis Assessment 0 10/14/2019  1:16 PM   Infiltration Assessment 0 10/14/2019  1:16 PM   Dressing Status Clean, dry, & intact 10/14/2019  1:16 PM   Dressing Type Transparent;Tape 10/14/2019  1:16 PM   Hub Color/Line Status Pink;Patent; Flushed 10/14/2019  1:16 PM        Opportunity for questions and clarification was provided.       Patient transported with:   Monitor  O2 @ 2 liters  Registered Nurse

## 2019-10-15 NOTE — PROGRESS NOTES
Problem: Mobility Impaired (Adult and Pediatric)  Goal: *Acute Goals and Plan of Care (Insert Text)  Description  Physical Therapy Goals  Initiated 10/15/2019 and to be accomplished within 7 day(s)  1. Patient will move from supine to sit and sit to supine , scoot up and down and roll side to side in bed with modified independence. 2.  Patient will transfer from bed to chair and chair to bed with modified independence using the least restrictive device. 3.  Patient will perform sit to stand with modified independence. 4.  Patient will ambulate with modified independence for >100 feet with the least restrictive device. 5.  Patient will ascend/descend 2 stairs with 1 handrail(s) with supervision/set-up. PLOF: Patient lives in 2 story home with 2STE and reports she mostly stays on the first level as she sleeps upright in a recliner. Patient uses RW to ambulate and for past few days was needing WC due to SOB. Outcome: Progressing Towards Goal   PHYSICAL THERAPY EVALUATION    Patient: Jah Bartholomew (49 y.o. female)  Date: 10/15/2019  Primary Diagnosis: Acute exacerbation of COPD with asthma ([facility] [J44.1, J45.901]  COPD with acute exacerbation ([facility] [J44.1]    Precautions: Fall    ASSESSMENT :  Patient is 62yo F admitted to hospital for COPD exacerbation and presents today alert and agreeable to therapy on BiPAP and switched herself to NC O2 for ambulation. Patient demos SOB with short distance mobility and gait unsteadiness requiring SBA/occasional CG for steadying with directional changes. Patient transferred to sitting in locked recliner as lunch arriving and left with BiPAP and call bell by her side. Patient denied further need for assist at this time and educated on call bell use; she acknowledged understanding. Patient will benefit from skilled intervention to address the above impairments.   Patient's rehabilitation potential is considered to be Good  Factors which may influence rehabilitation potential include:   ? None noted  ? Mental ability/status  ? Medical condition  ? Home/family situation and support systems  ? Safety awareness  ? Pain tolerance/management  ? Other:      PLAN :  Recommendations and Planned Interventions:   ?           Bed Mobility Training             ? Neuromuscular Re-Education  ? Transfer Training                   ? Orthotic/Prosthetic Training  ? Gait Training                          ? Modalities  ? Therapeutic Exercises           ? Edema Management/Control  ? Therapeutic Activities            ? Family Training/Education  ? Patient Education  ? Other (comment):    Frequency/Duration: Patient will be followed by physical therapy 1-2 times per day/4-7 days per week to address goals. Discharge Recommendations: Home Health  Further Equipment Recommendations for Discharge: N/A     SUBJECTIVE:   Patient stated I feel better, but still a little unsteady and short of breath.     OBJECTIVE DATA SUMMARY:     Past Medical History:   Diagnosis Date    Asthma     Chronic lung disease     COPD     Cystocele, midline     Diabetes mellitus (HCC)     GERD (gastroesophageal reflux disease)     Hidradenitis suppurativa     Hyperlipidemia     Hypertension     SHERRIE on CPAP     CPAP    Stress incontinence      Past Surgical History:   Procedure Laterality Date    BREAST SURGERY PROCEDURE UNLISTED      Right breast benign tumor removal    HX APPENDECTOMY      HX CHOLECYSTECTOMY      HX DILATION AND CURETTAGE      Dysfunctional uterine bleeding, thought 2/2 fibroids    HX TUBAL LIGATION       Barriers to Learning/Limitations: None  Compensate with: N/A  Home Situation:  Home Situation  Home Environment: Private residence  One/Two Story Residence: One story  Living Alone: No  Support Systems: Family member(s), Friends \ neighbors  Patient Expects to be Discharged to[de-identified] Private residence  Current DME Used/Available at Home: CPAP  Critical Behavior:  Neurologic State: Alert  Orientation Level: Oriented X4  Cognition: Follows commands    Strength:    Strength: Within functional limits(BLE)   Tone & Sensation:   Tone: Normal(BLE)   Sensation: Intact(BLE)   Range Of Motion:  AROM: Within functional limits(BLE)   Functional Mobility:  Bed Mobility:  Scooting: Modified independent  Transfers:  Sit to Stand: Modified independent  Stand to Sit: Modified independent    Balance:   Sitting: Intact  Standing: Impaired  Standing - Static: Good  Standing - Dynamic : Fair  Ambulation/Gait Training:  Distance (ft): 30 Feet (ft)   Ambulation - Level of Assistance: Stand-by assistance   Gait Abnormalities: Decreased step clearance   Speed/Pam: Slow  Step Length: Left shortened;Right shortened   Interventions: Verbal cues; Visual/Demos  Pain:  Pain level pre-treatment: 0/10   Pain level post-treatment: 0/10     Activity Tolerance:   Patient tolerated activity well with minimal SOB on NC O2. Please refer to the flowsheet for vital signs taken during this treatment. After treatment:   ?         Patient left in no apparent distress sitting up in chair  ? Patient left in no apparent distress in bed  ? Call bell left within reach  ? Nursing notified  ? Caregiver present  ? Bed alarm activated  ? SCDs applied    COMMUNICATION/EDUCATION:   ?         Role of Physical Therapy in the acute care setting. ?         Fall prevention education was provided and the patient/caregiver indicated understanding. ? Patient/family have participated as able in goal setting and plan of care. ?         Patient/family agree to work toward stated goals and plan of care. ?         Patient understands intent and goals of therapy, but is neutral about his/her participation. ?          Patient is unable to participate in goal setting/plan of care: ongoing with therapy staff. ?         Other:     Thank you for this referral.  Whit Lovelace, PT   Time Calculation: 12 mins      Eval Complexity: History: LOW Complexity : Zero comorbidities / personal factors that will impact the outcome / POCExam:LOW Complexity : 1-2 Standardized tests and measures addressing body structure, function, activity limitation and / or participation in recreation  Presentation: LOW Complexity : Stable, uncomplicated  Clinical Decision Making:Low Complexity    Overall Complexity:LOW

## 2019-10-15 NOTE — PROGRESS NOTES
Problem: Self Care Deficits Care Plan (Adult)  Goal: *Acute Goals and Plan of Care (Insert Text)  Description  Occupational Therapy Goals  Initiated 10/15/2019 within 7 day(s). 1.  Patient will perform bathing/dressing routine without increased SOB  2. Patient will perform pacing and modified proper breathing techniques for her efficient completion of her self care  3. Patient will demonstrate independence with scapular setting and strengthening program in preparation for her efficient completion of her self care routine   Outcome: Resolved/Met    OCCUPATIONAL THERAPY EVALUATION/TREATMENT/DISCHARGE    Patient: Pipo Brody (61 y.o. female)  Date: 10/15/2019  Primary Diagnosis: Acute exacerbation of COPD with asthma ([facility] [J44.1, J45.901]  COPD with acute exacerbation ([facility] [J44.1]  Precautions: none listed     PLOF: she has assistance for her self care 5 days/week and has difficulty breathing during those tasks she completes     ASSESSMENT AND RECOMMENDATIONS:  Based on the objective data described below, the patient presents with SOB during all activities (including talking). This limits her independence and overall quality of life. Following training, she is independent return demonstration of pacing, modified proper breathing techniques and scapular setting/strengthening program. Following this, she increased to being able to complete her LB dressing independently and without SOB. She is pleased with her progress and is able to independently apply it to her home situation during her functional mobility. She met her goals, has maximized her functional status (and is pleased with her progress); therefore, further skilled occupational therapy is not indicated at this time.   Discharge Recommendations: None  Further Equipment Recommendations for Discharge: N/A      SUBJECTIVE:   Patient stated This is the first time I understand what to do and why I need to avoid getting short of breath.     OBJECTIVE DATA SUMMARY:     Past Medical History:   Diagnosis Date    Asthma     Chronic lung disease     COPD     Cystocele, midline     Diabetes mellitus ([facility]     GERD (gastroesophageal reflux disease)     Hidradenitis suppurativa     Hyperlipidemia     Hypertension     SHERRIE on CPAP     CPAP    Stress incontinence      Past Surgical History:   Procedure Laterality Date    BREAST SURGERY PROCEDURE UNLISTED      Right breast benign tumor removal    HX APPENDECTOMY      HX CHOLECYSTECTOMY      HX DILATION AND CURETTAGE      Dysfunctional uterine bleeding, thought 2/2 fibroids    HX TUBAL LIGATION       Barriers to Learning/Limitations: yes  Home Situation:   Home Situation  Home Environment: Private residence  One/Two Story Residence: One story  Living Alone: No  Support Systems: Family member(s), Friends \ neighbors  Patient Expects to be Discharged to[de-identified] Private residence  Current DME Used/Available at Home: CPAP  ? Right hand dominant   ?      Left hand dominant  Cognitive/Behavioral Status:  Neurologic State: Alert  Orientation Level: Oriented X4   Skin: no skin integrity issues noted during OT evaluation  Edema: no UE edema noted  Vision/Perceptual:      Tracking is WFL     Coordination: BUE  WFL  Balance:  Sitting: Intact  Standing: Impaired  Standing - Static: Good  Standing - Dynamic : Fair  Strength: BUE  4/5  Tone & Sensation: BUE  WFL  Range of Motion: BUE  AROM WFL  Functional Mobility and Transfers for ADLs:  Bed Mobility:  Scooting: Modified independent  Transfers:  Sit to Stand: Modified independent  Stand to Sit: Modified independent  ADL Assessment:   Self feeding: verbal cues for breathing and pacing  Grooming: verbal cues for breathing and pacing  UB bathing/dressing: verbal cues for breathing and pacing  LB bathing/dressing: verbal cues for breathing and pacing    Following training, this patient is modified independent with her self care and no increased SOB when she follow the technique  ADL Intervention:   Energy conservation training as noted above  Therapeutic Exercise:  Scapular setting and strengthening  Pain:  Pain level pre-treatment: 0/10   Pain level post-treatment: 0/10   Activity Tolerance:   SOB and anxiety noted at rest; able to actively resolve following training as noted above  Please refer to the flowsheet for vital signs taken during this treatment. After treatment:   ?  Patient left in no apparent distress sitting up on the edge of the bed  ? Patient left in no apparent distress in bed  ? Call bell left within reach  ? Nursing notified  ? Caregiver present  ? Bed alarm activated    COMMUNICATION/EDUCATION:   ?      Role of Occupational Therapy in the acute care setting  ? Home safety education was provided and the patient/caregiver indicated understanding. ? Patient/family have participated as able and agree with findings and recommendations. ?      Patient is unable to participate in plan of care at this time. Thank you for this referral.  Joel Nye OTR/L  Time Calculation: 24 mins      Eval Complexity: History: LOW Complexity : Brief history review ; Examination: LOW Complexity : 1-3 performance deficits relating to physical, cognitive , or psychosocial skils that result in activity limitations and / or participation restrictions ;    Decision Making:LOW Complexity : No comorbidities that affect functional and no verbal or physical assistance needed to complete eval tasks

## 2019-10-15 NOTE — PROGRESS NOTES
Kelli Chapman  RESPONSE TO [facility] INQUIRY      Patient: Kirke Hashimoto MRN: [id number]  CSN: [id number]    YOB: 1960  Age: 60 y.o.   Sex: female         INQUIRY   Pt admitted with   COPD  exacerbation.      Pt noted to have  home oxygen  at 2L  NC.      If possible, please document in the progress notes and d/c summary if you are evaluating and / or treating any of the following:                   Chronic respiratory failure  o          With hypoxia  o          With hypercapnia              Other, please specify             Clinically unable to determine     The medical record reflects the following:        Risk Factors: COPD;          Clinical Indicators: per  H&P-  PMH of COPD on home O2  at 2L  .        Treatment: 02  at 2L   continues here.      Thank you,   Nadya Simon   RN  CCDS  x [phone]     RESPONSE        Shabnamdorina De Dios is a 60 y.o. female with PMH of COPD on home O2, IDDM2, HTN, HFpEF, SHERRIE on CPAP, GERD, allergic rhinitis, now presenting with complaint of SOB.     Acute on Chronic Respiratory Failure with hypercapnia  COPD Exacerbation  -previous hospitalization 8/30/19-9/5/19 for COPD exacerbation  -ABG in ED 7.38/49/75/29  -home meds: prednisone 10mg every other day, albuterol neb, spiriva,  -home oxygen 2L when active, trelegy nightly  Plan  -titrate oxygen to goal oxygen saturation of 88-92%  -influenza testing  -duoneb scheduled q4h  -levofloxacin 750mg IV  -solumedrol 40mg q6h  -6 min walk test before discharge    Michelle Arcos MD , PGY-1   [facility]   Senior Pager: [phone]   October 15, 2019, 7:08 PM

## 2019-10-15 NOTE — PROGRESS NOTES
Reason for Admission:   Acute exacerbation of COPD with asthma ([facility] [J44.1, J45.901]  COPD [facility]ation [facility] [J44.1]     RRAT Score:    40             Resources/supports as identified by patient/family:   Patient lives with daughter, Sandi Oneill and has personal care aide.     Top Challenges facing patient (as identified by patient/family and CM):          Finances/Medication cost?    No concerns voiced. Transportation      Daughter and medicaid transportation. Support system or lack thereof? Good support system. Living arrangements? Lives with daughter. Self-care/ADLs/Cognition? Alert and orient and need assistance ADL's & IADL's. Current Advanced Directive/Advance Care Plan:   no                          Plan for utilizing home health:    Pt is aware that PT is recommending HH, but pt declines HH. Likelihood of readmission:   HIGH                Initial assessment completed with patient. Cognitive status of patient: oriented to time, place, person and situation.      Face sheet information confirmed:  yes. The patient designates luana Byrd ([phone]) to participate in her discharge plan and to receive any needed information. This patient lives in a duplex home with daughter. Patient is not able to navigate steps as needed. Prior to hospitalization, patient was considered to be independent with ADLs/IADLS : no . If not independent,  patient needs assist with : dressing, bathing, food preparation and cooking, cleaning, and laundry. Patient [facility] 5 days a week 6 hrs a day.     Patient has a current ACP document on file: no  The patient and daughter will be available to transport patient home upon discharge.    The patient already has Walker, BSC, shower chair, oxygen concentrator and tanks ( 2 Liters via NC)  and CPAP medical equipment available in the home.      Patient is not currently active with home health. Patient has not stayed in a skilled nursing facility or rehab.         This patient is on dialysis :no     Freedom of choice signed: no.      Currently, the discharge plan is Home with family assistance and personal care aide. - Patient refused home health.      The patient states that she can obtain her medications from the pharmacy, and take her medications as directed.     Patient's current insurance is [place] Medicare Part A & B and [facility] Medicaid.                    Care Management Interventions  PCP Verified by CM: Yes  Mode of Transport at Discharge: Self  Transition of Care Consult (CM Consult): Discharge Planning  Current Support Network: Lives with Caregiver  Confirm Follow Up Transport: Family  Plan discussed with Pt/Family/Caregiver: Yes  Discharge Location  Discharge Placement: Home        HAYDEN Barney, [address]

## 2019-10-15 NOTE — PROGRESS NOTES
Bedside and Verbal shift change report given to Lilliam Perez RN (oncoming nurse) by Juana Schmidt RN (offgoing nurse). Report included the following information SBAR, Kardex and Recent Results.

## 2019-10-15 NOTE — PROGRESS NOTES
Intern Progress Note  [facility]       Patient: Leesa Garsia MRN: [id number]  CSN: [id number]    YOB: 1960  Age: 60 y.o. Sex: female    DOA: 10/14/2019 LOS:  LOS: 1 day                    Subjective:     Acute events: No acute events overnight. Pt had just got back from walking to the bathroom when I went into her room. She was very short of breath. Recommended for her to put back on her trelegy machine during daytime if she gets this dyspneic. Review of Systems   Constitutional: Negative for chills and fever. HENT: Negative for congestion and sinus pain. Respiratory: Positive for cough, sputum production, shortness of breath and wheezing. Cardiovascular: Negative for chest pain and palpitations. Gastrointestinal: Negative for abdominal pain. Genitourinary: Negative for dysuria. Neurological: Negative for headaches. Objective:      Patient Vitals for the past 24 hrs:   Temp Pulse Resp BP SpO2   10/15/19 0829 -- -- -- -- 94 %   10/15/19 0808 97.8 °F (36.6 °C) 85 20 135/78 90 %   10/15/19 0427 97.7 °F (36.5 °C) 73 19 107/64 94 %   10/15/19 0037 97.5 °F (36.4 °C) 92 21 113/69 93 %   10/14/19 2125 98.2 °F (36.8 °C) 97 22 130/68 95 %   10/14/19 2015 96.7 °F (35.9 °C) 92 28 142/65 --   10/14/19 1930 -- 95 24 144/53 95 %   10/14/19 1221 -- -- -- -- 97 %         Intake/Output Summary (Last 24 hours) at 10/15/2019 1010  Last data filed at 10/15/2019 0427  Gross per 24 hour   Intake 150 ml   Output 500 ml   Net -350 ml       Physical Exam   Constitutional: She is oriented to person, place, and time. She appears well-nourished. She appears distressed. HENT:   Head: Normocephalic and atraumatic. Eyes: Pupils are equal, round, and reactive to light. Neck: Normal range of motion. No JVD present. Cardiovascular: Normal rate, regular rhythm and normal heart sounds. Pulmonary/Chest: She is in respiratory distress. She has wheezes.    Tachypneic, increased respiratory effort, expiratory wheezing, diffuse coarse breath sounds   Abdominal: Soft. She exhibits no distension. Musculoskeletal: She exhibits no edema. Neurological: She is alert and oriented to person, place, and time. Skin: Skin is warm and dry. She is not diaphoretic. Psychiatric: She has a normal mood and affect.        Lab/Data Reviewed:      CBC W/O DIFF    Collection Time: 10/15/19  4:10 AM   Result Value Ref Range    WBC 8.8 4.6 - 13.2 K/uL    RBC 4.17 (L) 4.20 - 5.30 M/uL    HGB 12.4 12.0 - 16.0 g/dL    HCT 36.4 35.0 - 45.0 %    MCV 87.3 74.0 - 97.0 FL    MCH 29.7 24.0 - 34.0 PG    MCHC 34.1 31.0 - 37.0 g/dL    RDW 14.0 11.6 - 14.5 %    PLATELET 827 407 - 555 K/uL    MPV 8.9 (L) 9.2 - 11.8 FL     BMP:   Lab Results   Component Value Date/Time     (L) 10/15/2019 04:10 AM    K 4.3 10/15/2019 04:10 AM     10/15/2019 04:10 AM    CO2 26 10/15/2019 04:10 AM    AGAP 6 10/15/2019 04:10 AM     (H) 10/15/2019 04:10 AM    BUN 18 10/15/2019 04:10 AM    CREA 1.00 10/15/2019 04:10 AM    GFRAA >60 10/15/2019 04:10 AM    GFRNA 57 (L) 10/15/2019 04:10 AM      Scheduled Medications Reviewed:  Current Facility-Administered Medications   Medication Dose Route Frequency    influenza vaccine 2019-20 (6 mos+)(PF) (FLUARIX/FLULAVAL/FLUZONE QUAD) injection 0.5 mL  0.5 mL IntraMUSCular PRIOR TO DISCHARGE    aspirin delayed-release tablet 81 mg  81 mg Oral DAILY    fluticasone propionate (FLONASE) 50 mcg/actuation nasal spray 2 Spray  2 Spray Both Nostrils DAILY    hydroCHLOROthiazide (HYDRODIURIL) tablet 12.5 mg  12.5 mg Oral DAILY    lisinopril (PRINIVIL, ZESTRIL) tablet 20 mg  20 mg Oral BID    pantoprazole (PROTONIX) tablet 40 mg  40 mg Oral ACB    tiotropium (SPIRIVA) inhalation capsule 18 mcg  1 Cap Inhalation DAILY    enoxaparin (LOVENOX) injection 40 mg  40 mg SubCUTAneous Q24H    methylPREDNISolone (PF) (SOLU-MEDROL) injection 60 mg  60 mg IntraVENous Q12H    albuterol-ipratropium (DUO-NEB) 2.5 MG-0.5 MG/3 ML  3 mL Nebulization Q4H RT    levoFLOXacin (LEVAQUIN) tablet 500 mg  500 mg Oral Q24H    insulin glargine (LANTUS) injection 17 Units  17 Units SubCUTAneous QHS    insulin lispro (HUMALOG) injection   SubCUTAneous AC&HS         Imaging, microbiology, and EKG/Telemetry:  No results found.       Assessment/Plan     Lauri De Dios is a 60 y.o. female with PMH of COPD on home O2, IDDM2, HTN, HFpEF, SHERRIE on CPAP, GERD, allergic rhinitis, now presenting with complaint of SOB.     Acute on Chronic Respiratory Failure with hypercapnia  COPD Exacerbation  -previous hospitalization 8/30/19-9/5/19 for COPD exacerbation  -ABG in ED 7.38/49/75/29  -home meds: prednisone 10mg every other day, albuterol neb, spiriva,  -home oxygen 2L when active, trelegy nightly  Plan  -titrate oxygen to goal oxygen saturation of 88-92%  -influenza testing  -duoneb scheduled q4h  -levofloxacin 750mg IV  -solumedrol 40mg q6h  -6 min walk test before discharge     Subjective Dysphagia  -speech swallow eval     SHERRIE/pulm htn  -trelegy nightly     Insuline dependent Type 2 Diabetes  -home lantus 35u qhs, novolog SSI  -monitor blood glucose while on steroids  -continue home lantus     Hypertension, HFpEF  -home lisinopril 20mg bid, HCTZ 12.5mg  -echo 7/7/18 EF 66%     GERD  -pepcid 20mg     Allergic Rhinitis  -home zyrtec, flonase, singulair 10mg     Diet: diabetic  DVT Prophylaxis: lovenox  Code Status: Full Code  Point of Contact: Sterling Potts, daughter, [phone]     Disposition and anticipated LOS: +/- 2 midnights    Jeneal Gowers, MD-R  [address], PGY-1  10/15/19 10:10 AM

## 2019-10-15 NOTE — CDMP QUERY
Pt admitted with   COPD  exacerbation. Pt noted to have  home oxygen  at 2L  NC. If possible, please document in the progress notes and d/c summary if you are evaluating and / or treating any of the following:  Chronic respiratory failure 
o With hypoxia 
o With hypercapnia Other, please specify  Clinically unable to determine The medical record reflects the following: 
 
   Risk Factors: COPD;   
 
   Clinical Indicators: per  H&P-  PMH of COPD on home O2  at 2L  . Treatment: 02  at 2L   continues here. Thank you,   Layton Lopes RN  CCDS  x [phone]

## 2019-10-15 NOTE — PROGRESS NOTES
conducted an initial consultation and Spiritual Assessment for Dawood Garduno, who is a 60 y.o.,female. Patient's Primary Language is: [place]. According to the patient's EMR Bahai Affiliation is: Nadine Forman.     The reason the Patient came to the hospital is:   Patient Active Problem List    Diagnosis Date Noted    Atelectasis of right lung 12/14/2018    Acute exacerbation of chronic obstructive pulmonary disease (COPD) ([address] 10/07/2018    Bilateral carotid bruits 07/30/2018    Palpitations 07/30/2018    Type 2 diabetes with nephropathy ([address] 05/30/2018    Hyperlipidemia 01/24/2018    COPD with acute bronchitis ([address] 68/02/7284    Diastolic CHF, chronic ([address] 02/09/2017    Diverticulosis 02/09/2017    COPD exacerbation ([address] 02/08/2017    Acute exacerbation of COPD with asthma ([address] 02/08/2017    Obesity, Class III, BMI 40-49.9 (morbid obesity) ([address] 08/09/2016    Chest pain 08/09/2016    Dyspnea 08/09/2016    COPD with acute exacerbation ([address] 05/24/2015    COPD (chronic obstructive pulmonary disease) (HCC) 03/27/2015    Allergic rhinitis 03/27/2015    Essential hypertension, benign 09/30/2012    Diabetes mellitus, type 2 ([address] 09/30/2012    Esophageal reflux 09/30/2012    SHERRIE on CPAP         The  provided the following Interventions:  Initiated a relationship of care and support. Explored issues of scout, belief, spirituality and Sabianism/ritual needs while hospitalized. Listened empathically. Provided chaplaincy education. Provided information about Spiritual Care Services. Offered prayer and assurance of continued prayers on patient's behalf. Chart reviewed. The following outcomes where achieved:  Patient shared limited information about both their medical narrative and spiritual journey/beliefs.  confirmed Patient's Bahai Affiliation. Patient processed feeling about current hospitalization.   Patient expressed gratitude for 's visit. Assessment:  Patient does not have any Caodaism/cultural needs that will affect patient's preferences in health care. There are no spiritual or Caodaism issues which require intervention at this time. Plan:  Chaplains will continue to follow and will provide pastoral care on an as needed/requested basis.  recommends bedside caregivers page  on duty if patient shows signs of acute spiritual or emotional distress.     [place] [id number]   [phone]

## 2019-10-16 LAB
ANION GAP SERPL CALC-SCNC: 5 MMOL/L (ref 3–18)
BUN SERPL-MCNC: 20 MG/DL (ref 7–18)
BUN/CREAT SERPL: 18 (ref 12–20)
CALCIUM SERPL-MCNC: 9.5 MG/DL (ref 8.5–10.1)
CHLORIDE SERPL-SCNC: 102 MMOL/L (ref 100–111)
CO2 SERPL-SCNC: 27 MMOL/L (ref 21–32)
CREAT SERPL-MCNC: 1.12 MG/DL (ref 0.6–1.3)
ERYTHROCYTE [DISTWIDTH] IN BLOOD BY AUTOMATED COUNT: 14.1 % (ref 11.6–14.5)
GLUCOSE BLD STRIP.AUTO-MCNC: 310 MG/DL (ref 70–110)
GLUCOSE BLD STRIP.AUTO-MCNC: 350 MG/DL (ref 70–110)
GLUCOSE BLD STRIP.AUTO-MCNC: 371 MG/DL (ref 70–110)
GLUCOSE BLD STRIP.AUTO-MCNC: 386 MG/DL (ref 70–110)
GLUCOSE SERPL-MCNC: 365 MG/DL (ref 74–99)
HCT VFR BLD AUTO: 35.3 % (ref 35–45)
HGB BLD-MCNC: 11.7 G/DL (ref 12–16)
MCH RBC QN AUTO: 29.7 PG (ref 24–34)
MCHC RBC AUTO-ENTMCNC: 33.1 G/DL (ref 31–37)
MCV RBC AUTO: 89.6 FL (ref 74–97)
PLATELET # BLD AUTO: 297 K/UL (ref 135–420)
PMV BLD AUTO: 9.3 FL (ref 9.2–11.8)
POTASSIUM SERPL-SCNC: 4.5 MMOL/L (ref 3.5–5.5)
RBC # BLD AUTO: 3.94 M/UL (ref 4.2–5.3)
SODIUM SERPL-SCNC: 134 MMOL/L (ref 136–145)
WBC # BLD AUTO: 12.5 K/UL (ref 4.6–13.2)

## 2019-10-16 PROCEDURE — 97110 THERAPEUTIC EXERCISES: CPT

## 2019-10-16 PROCEDURE — 80048 BASIC METABOLIC PNL TOTAL CA: CPT

## 2019-10-16 PROCEDURE — 77010033678 HC OXYGEN DAILY

## 2019-10-16 PROCEDURE — 36415 COLL VENOUS BLD VENIPUNCTURE: CPT

## 2019-10-16 PROCEDURE — 85027 COMPLETE CBC AUTOMATED: CPT

## 2019-10-16 PROCEDURE — 94640 AIRWAY INHALATION TREATMENT: CPT

## 2019-10-16 PROCEDURE — 74011000250 HC RX REV CODE- 250: Performed by: STUDENT IN AN ORGANIZED HEALTH CARE EDUCATION/TRAINING PROGRAM

## 2019-10-16 PROCEDURE — 74011636637 HC RX REV CODE- 636/637: Performed by: FAMILY MEDICINE

## 2019-10-16 PROCEDURE — 74011250637 HC RX REV CODE- 250/637: Performed by: STUDENT IN AN ORGANIZED HEALTH CARE EDUCATION/TRAINING PROGRAM

## 2019-10-16 PROCEDURE — 65660000000 HC RM CCU STEPDOWN

## 2019-10-16 PROCEDURE — 74011636637 HC RX REV CODE- 636/637: Performed by: STUDENT IN AN ORGANIZED HEALTH CARE EDUCATION/TRAINING PROGRAM

## 2019-10-16 PROCEDURE — 74011250636 HC RX REV CODE- 250/636: Performed by: STUDENT IN AN ORGANIZED HEALTH CARE EDUCATION/TRAINING PROGRAM

## 2019-10-16 PROCEDURE — 82962 GLUCOSE BLOOD TEST: CPT

## 2019-10-16 RX ORDER — INSULIN GLARGINE 100 [IU]/ML
37 INJECTION, SOLUTION SUBCUTANEOUS
Status: DISCONTINUED | OUTPATIENT
Start: 2019-10-16 | End: 2019-10-17 | Stop reason: HOSPADM

## 2019-10-16 RX ORDER — INSULIN GLARGINE 100 [IU]/ML
2 INJECTION, SOLUTION SUBCUTANEOUS ONCE
Status: COMPLETED | OUTPATIENT
Start: 2019-10-16 | End: 2019-10-16

## 2019-10-16 RX ADMIN — FLUTICASONE PROPIONATE 2 SPRAY: 50 SPRAY, METERED NASAL at 08:42

## 2019-10-16 RX ADMIN — METHYLPREDNISOLONE SODIUM SUCCINATE 60 MG: 40 INJECTION, POWDER, FOR SOLUTION INTRAMUSCULAR; INTRAVENOUS at 08:44

## 2019-10-16 RX ADMIN — IPRATROPIUM BROMIDE AND ALBUTEROL SULFATE 3 ML: .5; 3 SOLUTION RESPIRATORY (INHALATION) at 07:27

## 2019-10-16 RX ADMIN — INSULIN LISPRO 15 UNITS: 100 INJECTION, SOLUTION INTRAVENOUS; SUBCUTANEOUS at 08:39

## 2019-10-16 RX ADMIN — IPRATROPIUM BROMIDE AND ALBUTEROL SULFATE 3 ML: .5; 3 SOLUTION RESPIRATORY (INHALATION) at 04:32

## 2019-10-16 RX ADMIN — INSULIN GLARGINE 37 UNITS: 100 INJECTION, SOLUTION SUBCUTANEOUS at 21:52

## 2019-10-16 RX ADMIN — LISINOPRIL 20 MG: 20 TABLET ORAL at 08:41

## 2019-10-16 RX ADMIN — IPRATROPIUM BROMIDE AND ALBUTEROL SULFATE 3 ML: .5; 3 SOLUTION RESPIRATORY (INHALATION) at 11:36

## 2019-10-16 RX ADMIN — ASPIRIN 81 MG: 81 TABLET, COATED ORAL at 08:41

## 2019-10-16 RX ADMIN — INSULIN GLARGINE 2 UNITS: 100 INJECTION, SOLUTION SUBCUTANEOUS at 12:39

## 2019-10-16 RX ADMIN — INSULIN LISPRO 12 UNITS: 100 INJECTION, SOLUTION INTRAVENOUS; SUBCUTANEOUS at 21:53

## 2019-10-16 RX ADMIN — INSULIN LISPRO 15 UNITS: 100 INJECTION, SOLUTION INTRAVENOUS; SUBCUTANEOUS at 12:39

## 2019-10-16 RX ADMIN — ENOXAPARIN SODIUM 40 MG: 40 INJECTION SUBCUTANEOUS at 17:52

## 2019-10-16 RX ADMIN — IPRATROPIUM BROMIDE AND ALBUTEROL SULFATE 3 ML: .5; 3 SOLUTION RESPIRATORY (INHALATION) at 16:09

## 2019-10-16 RX ADMIN — IPRATROPIUM BROMIDE AND ALBUTEROL SULFATE 3 ML: .5; 3 SOLUTION RESPIRATORY (INHALATION) at 19:33

## 2019-10-16 RX ADMIN — PANTOPRAZOLE SODIUM 40 MG: 40 TABLET, DELAYED RELEASE ORAL at 08:41

## 2019-10-16 RX ADMIN — INSULIN LISPRO 15 UNITS: 100 INJECTION, SOLUTION INTRAVENOUS; SUBCUTANEOUS at 17:50

## 2019-10-16 RX ADMIN — LEVOFLOXACIN 500 MG: 250 TABLET, FILM COATED ORAL at 17:52

## 2019-10-16 RX ADMIN — PREDNISONE 30 MG: 20 TABLET ORAL at 17:51

## 2019-10-16 RX ADMIN — LISINOPRIL 20 MG: 20 TABLET ORAL at 17:51

## 2019-10-16 NOTE — PROGRESS NOTES
Problem: Mobility Impaired (Adult and Pediatric)  Goal: *Acute Goals and Plan of Care (Insert Text)  Description  Physical Therapy Goals  Initiated 10/15/2019 and to be accomplished within 7 day(s)  1. Patient will move from supine to sit and sit to supine , scoot up and down and roll side to side in bed with modified independence. 2.  Patient will transfer from bed to chair and chair to bed with modified independence using the least restrictive device. 3.  Patient will perform sit to stand with modified independence. 4.  Patient will ambulate with modified independence for >100 feet with the least restrictive device. 5.  Patient will ascend/descend 2 stairs with 1 handrail(s) with supervision/set-up. PLOF: Patient lives in 2 story home with 2STE and reports she mostly stays on the first level as she sleeps upright in a recliner. Patient uses RW to ambulate and for past few days was needing WC due to SOB. Outcome: Progressing Towards Goal   PHYSICAL THERAPY TREATMENT    Patient: Zaida Morgan (1 y.o. female)  Date: 10/16/2019  Diagnosis: Acute exacerbation of COPD with asthma ([facility] [J44.1, J45.901]  COPD with acute exacerbation ([facility] [J44.1]   Precautions:  Chart, physical therapy assessment, plan of care and goals were reviewed. OBJECTIVE/ ASSESSMENT:  Patient found supine in bed willing to work with PT. Pt has just returned from the bathroom and is feeling short of breath, thus supine therex was performed this tx. Pt performed SLR and heel slides x 5 bilaterally as she reports she cannot perform 10. Pt also performed ankle pumps x 10 bilaterally and was encouraged to perform therex on own. Progression toward goals:  ?      Improving appropriately and progressing toward goals  ? Improving slowly and progressing toward goals  ?       Not making progress toward goals and plan of care will be adjusted     PLAN:  Patient continues to benefit from skilled intervention to address the above impairments. Continue treatment per established plan of care. Discharge Recommendations:  Home Health  Further Equipment Recommendations for Discharge: None     SUBJECTIVE:   Patient stated I can move fine, I just get short of breath.     OBJECTIVE DATA SUMMARY:   Critical Behavior:  Neurologic State: Alert  Orientation Level: Oriented X4  Cognition: Follows commands  Therapeutic Exercises:   Per above    Pain:  Pain level pre-treatment: Not rated  Pain level post-treatment: Not rated    Activity Tolerance:   Fair  Please refer to the flowsheet for vital signs taken during this treatment. After treatment:   ? Patient left in no apparent distress sitting up in chair  ? Patient left in no apparent distress in bed  ? Call bell left within reach  ? Nursing notified  ? Caregiver present  ? Bed alarm activated  ? SCDs applied    COMMUNICATION/EDUCATION:   ?         Role of Physical Therapy  ? Fall prevention  ? Bed mobility  ? Transfers  ? Ambulation / gait  ? Assistive device management  ? Stairs  ? Body mechanics  ? Position change   ? Therapeutic exercise  ? Activity pacing / energy conservation  ?          Other:      Berta Alvarado PTA   Time Calculation: 12 mins

## 2019-10-16 NOTE — DIABETES MGMT
GLYCEMIC CONTROL AND NUTRITION    Assessment/Recommendations:  Fasting lab glucose this am 365 mg/dl  Noted lantus dose increased to 37 units every bedtime  Noted steroid dose decreased. Continue corrective insulin coverage as needed  Pt already receiving very insulin resistant dosing. Will continue inpatient monitoring. Most recent blood glucose values:  Results for Hemal Alvarado (MRN [id number]) as of 10/16/2019 15:46   Ref. Range 10/15/2019 12:17 10/15/2019 15:27 10/15/2019 21:15 10/16/2019 06:43 10/16/2019 12:08   GLUCOSE,FAST - POC Latest Ref Range: 70 - 110 mg/dL 351 (H) 360 (H) 325 (H) 386 (H) 350 (H)     Current A1C of 8.3 % is equivalent to average blood glucose of 192 mg/dl over the past 2-3 months.     Current hospital diabetes medications:   lantus 37 units every bedtime  Lispro corrective insulin coverage AC&HS  Previous day's insulin requirements:   lantus 35 units  Lispro 54 units corrective insulin  Home diabetes medications:  lantus 32 units every night  Novolog corrective scale 3 times daily  Diet:    Diabetic consistent carb      Linsey Phillips, [address] [facility] CDE  Ext [phone]

## 2019-10-16 NOTE — ROUTINE PROCESS
Bedside and Verbal shift change report given to [facility] RN (oncoming nurse) by Fede Briseno RN (offgoing nurse). Report given with SBAR, Kardex, Intake/Output, MAR, Accordion and Recent Results.

## 2019-10-16 NOTE — PROGRESS NOTES
Intern Progress Note  [facility]       Patient: Lissette Guerra MRN: [id number]  CSN: [id number]    YOB: 1960  Age: 60 y.o. Sex: female    DOA: 10/14/2019 LOS:  LOS: 2 days                    Subjective:     Acute events: No acute events    Patient improving on current therapy and had a great session with occupational therapy. She says she is stronger but still somewhat wheezy and feels comfortable leaving tomorrow. Review of Systems   Constitutional: Negative for chills and fever. Respiratory: Negative for cough and shortness of breath. Cardiovascular: Negative for chest pain. Gastrointestinal: Negative for nausea and vomiting. Genitourinary: Negative for dysuria. Skin: Negative for rash. Objective:      Patient Vitals for the past 24 hrs:   Temp Pulse Resp BP SpO2   10/16/19 0433 97.2 °F (36.2 °C) 70 18 117/70 99 %   10/15/19 2358 97.3 °F (36.3 °C) 76 18 149/73 94 %   10/15/19 2022 -- -- -- -- 95 %   10/15/19 2017 97.4 °F (36.3 °C) 83 18 137/71 95 %   10/15/19 1519 97.6 °F (36.4 °C) 93 18 119/70 95 %   10/15/19 1251 -- -- -- -- 99 %   10/15/19 1209 97.6 °F (36.4 °C) 71 19 115/72 95 %   10/15/19 0829 -- -- -- -- 94 %   10/15/19 0808 97.8 °F (36.6 °C) 85 20 135/78 90 %       No intake or output data in the 24 hours ending 10/16/19 9067    Physical Exam:  Constitutional: She is oriented to person, place, and time. She appears well-nourished. She is in no acute distress   HENT:   Head: Normocephalic and atraumatic. Eyes: Pupils are equal, round, and reactive to light. Neck: Normal range of motion. No JVD present. Cardiovascular: Normal rate, regular rhythm and normal heart sounds. Pulmonary/Chest: No increased respiratory effort, normal rate and equal expansion bilaterally. She has diffuse wheezes. Abdominal: Soft. She exhibits no distension. Musculoskeletal: She exhibits no edema.    Neurological: She is alert and oriented to person, place, and time. Skin: Skin is warm and dry. She is not diaphoretic. Psychiatric: She has a normal mood and affect.      Lab/Data Reviewed:      Recent Results (from the past 12 hour(s))   GLUCOSE, POC    Collection Time: 10/15/19  9:15 PM   Result Value Ref Range    Glucose (POC) 325 (H) 70 - 856 mg/dL   METABOLIC PANEL, BASIC    Collection Time: 10/16/19  3:46 AM   Result Value Ref Range    Sodium 134 (L) 136 - 145 mmol/L    Potassium 4.5 3.5 - 5.5 mmol/L    Chloride 102 100 - 111 mmol/L    CO2 27 21 - 32 mmol/L    Anion gap 5 3.0 - 18 mmol/L    Glucose 365 (H) 74 - 99 mg/dL    BUN 20 (H) 7.0 - 18 MG/DL    Creatinine 1.12 0.6 - 1.3 MG/DL    BUN/Creatinine ratio 18 12 - 20      GFR est AA >60 >60 ml/min/1.73m2    GFR est non-AA 50 (L) >60 ml/min/1.73m2    Calcium 9.5 8.5 - 10.1 MG/DL   CBC W/O DIFF    Collection Time: 10/16/19  3:46 AM   Result Value Ref Range    WBC 12.5 4.6 - 13.2 K/uL    RBC 3.94 (L) 4.20 - 5.30 M/uL    HGB 11.7 (L) 12.0 - 16.0 g/dL    HCT 35.3 35.0 - 45.0 %    MCV 89.6 74.0 - 97.0 FL    MCH 29.7 24.0 - 34.0 PG    MCHC 33.1 31.0 - 37.0 g/dL    RDW 14.1 11.6 - 14.5 %    PLATELET 445 202 - 639 K/uL    MPV 9.3 9.2 - 11.8 FL   GLUCOSE, POC    Collection Time: 10/16/19  6:43 AM   Result Value Ref Range    Glucose (POC) 386 (H) 70 - 110 mg/dL         Scheduled Medications Reviewed:  Current Facility-Administered Medications   Medication Dose Route Frequency    influenza vaccine 2019-20 (6 mos+)(PF) (FLUARIX/FLULAVAL/FLUZONE QUAD) injection 0.5 mL  0.5 mL IntraMUSCular PRIOR TO DISCHARGE    insulin glargine (LANTUS) injection 35 Units  35 Units SubCUTAneous QHS    aspirin delayed-release tablet 81 mg  81 mg Oral DAILY    fluticasone propionate (FLONASE) 50 mcg/actuation nasal spray 2 Spray  2 Spray Both Nostrils DAILY    hydroCHLOROthiazide (HYDRODIURIL) tablet 12.5 mg  12.5 mg Oral DAILY    lisinopril (PRINIVIL, ZESTRIL) tablet 20 mg  20 mg Oral BID    pantoprazole (PROTONIX) tablet 40 mg  40 mg Oral ACB    tiotropium (SPIRIVA) inhalation capsule 18 mcg  1 Cap Inhalation DAILY    enoxaparin (LOVENOX) injection 40 mg  40 mg SubCUTAneous Q24H    methylPREDNISolone (PF) (SOLU-MEDROL) injection 60 mg  60 mg IntraVENous Q12H    albuterol-ipratropium (DUO-NEB) 2.5 MG-0.5 MG/3 ML  3 mL Nebulization Q4H RT    levoFLOXacin (LEVAQUIN) tablet 500 mg  500 mg Oral Q24H    insulin lispro (HUMALOG) injection   SubCUTAneous AC&HS       Assessment/Plan   Lauri Curran  is a 60 y.o. female with PMH of COPD on home O2, IDDM2, HTN, HFpEF, SHERRIE on CPAP, GERD, allergic rhinitis, now presenting with complaint of SOB.     Acute on Chronic Respiratory Failure with hypercapnia  COPD Exacerbation  -previous hospitalization 8/30/19-9/5/19 for COPD exacerbation  -ABG in ED 7.38/49/75/29  -home meds: prednisone 10mg every other day, albuterol neb, spiriva,  -home oxygen 2L when active, trelegy nightly  -Flu negative  Plan  -titrate oxygen to goal oxygen saturation of 88-92%  -duoneb scheduled q4h  -levofloxacin 750mg IV to PO today  -solumedrol 40mg q12h to PO prednisone 30 q12     Subjective Dysphagia, not concerning  -speech swallow evaluated, advised patient and signed off     SHERRIE/pulm htn  -trelegy nightly     Insuline dependent Type 2 Diabetes  - Eevated glucoses overnight  -home lantus 35u qhs increase today to 37U while decreasing steroid dose, novolog SSI  -monitor blood glucose while on steroids  -continue home lantus     Hypertension, HFpEF  -home lisinopril 20mg bid, HCTZ 12.5mg  -echo 7/7/18 EF 66%     GERD  -pepcid 20mg     Allergic Rhinitis  -home zyrtec, flonase, singulair 10mg     Diet: diabetic  DVT Prophylaxis: lovenox  Code Status: Full Code  Point of [address], daughter, [phone]     Disposition and anticipated LOS: +/- Mery Kang MD   [id number]  10/16/19 6:51 AM

## 2019-10-16 NOTE — PROGRESS NOTES
Intern Progress Note  [facility] Medicine  This is a medical student note       Patient: Sarwat Biggs MRN: [id number]  CSN: [id number]    YOB: 1960  Age: 60 y.o. Sex: female    DOA: 10/14/2019 LOS:  LOS: 2 days                    Subjective:     Acute events: Glucose 360 > 325 > 386. Lantus increased last night to 35 units. Ms. Amy Fung states she is feeling much better this morning. She still has SOB when ambulating and talking in full sentences. Reports headache with her increased glucose levels. ROS  Constitutional: denies fevers, chills, fatigue. HEENT: endorses HAs. Denies changes in vision, changes in hearing, difficulty swallowing. Cardiovascular: denies chest pain, palpitations, edema. Pulmonary: endorses cough, SOB, wheezing. Gastrointestinal: endorses chronic abdominal pain. Denies diarrhea, constipation. Genitourinary: endorses urinary frequency. Denies hematuria, dysuria, urgency. Skin: denies rashes, lesions, ulcers. Musculoskeletal: denies joint pain, decreased ROM, myalgias. Neurological: endorses HA. Denies numbness, tingling, weakness. Psychological: denies depressed mood, anxiety, changes in thought content. Objective:      Patient Vitals for the past 24 hrs:   Temp Pulse Resp BP SpO2   10/16/19 0821 97.2 °F (36.2 °C) 88 18 113/68 97 %   10/16/19 0736 -- -- -- -- 96 %   10/16/19 0433 97.2 °F (36.2 °C) 70 18 117/70 99 %   10/15/19 2358 97.3 °F (36.3 °C) 76 18 149/73 94 %   10/15/19 2022 -- -- -- -- 95 %   10/15/19 2017 97.4 °F (36.3 °C) 83 18 137/71 95 %   10/15/19 1519 97.6 °F (36.4 °C) 93 18 119/70 95 %   10/15/19 1251 -- -- -- -- 99 %   10/15/19 1209 97.6 °F (36.4 °C) 71 19 115/72 95 %       No intake or output data in the 24 hours ending 10/16/19 0819    Physical Exam:   General:  Alert and Responsive and in No acute distress. HEENT: Conjunctiva pink, sclera anicteric. EOMI. Pharynx moist, nonerythematous. Moist mucous membranes.    CV: RRR, no murmurs. No visible pulsations or thrills. RESP:  Wheezing heard in bilateral lung fields. No rhonchi or rales. Equal expansion bilaterally. ABD:  Mildly tender to palpation in all 4 quadrants. Periumbilical hernia. Soft, nondistended. No hepatosplenomegaly. MS:  No joint deformity or instability. No atrophy. Neuro: A+Ox3. Ext:  No edema. 2+ radial and dp pulses bilaterally. Skin:  No rashes, lesions, or ulcers. Good turgor.       Airway: 2L O2 NC    Lab/Data Reviewed:  Recent Results (from the past 24 hour(s))   GLUCOSE, POC    Collection Time: 10/15/19 12:17 PM   Result Value Ref Range    Glucose (POC) 351 (H) 70 - 110 mg/dL   GLUCOSE, POC    Collection Time: 10/15/19  3:27 PM   Result Value Ref Range    Glucose (POC) 360 (H) 70 - 110 mg/dL   GLUCOSE, POC    Collection Time: 10/15/19  9:15 PM   Result Value Ref Range    Glucose (POC) 325 (H) 70 - 199 mg/dL   METABOLIC PANEL, BASIC    Collection Time: 10/16/19  3:46 AM   Result Value Ref Range    Sodium 134 (L) 136 - 145 mmol/L    Potassium 4.5 3.5 - 5.5 mmol/L    Chloride 102 100 - 111 mmol/L    CO2 27 21 - 32 mmol/L    Anion gap 5 3.0 - 18 mmol/L    Glucose 365 (H) 74 - 99 mg/dL    BUN 20 (H) 7.0 - 18 MG/DL    Creatinine 1.12 0.6 - 1.3 MG/DL    BUN/Creatinine ratio 18 12 - 20      GFR est AA >60 >60 ml/min/1.73m2    GFR est non-AA 50 (L) >60 ml/min/1.73m2    Calcium 9.5 8.5 - 10.1 MG/DL   CBC W/O DIFF    Collection Time: 10/16/19  3:46 AM   Result Value Ref Range    WBC 12.5 4.6 - 13.2 K/uL    RBC 3.94 (L) 4.20 - 5.30 M/uL    HGB 11.7 (L) 12.0 - 16.0 g/dL    HCT 35.3 35.0 - 45.0 %    MCV 89.6 74.0 - 97.0 FL    MCH 29.7 24.0 - 34.0 PG    MCHC 33.1 31.0 - 37.0 g/dL    RDW 14.1 11.6 - 14.5 %    PLATELET 921 437 - 659 K/uL    MPV 9.3 9.2 - 11.8 FL   GLUCOSE, POC    Collection Time: 10/16/19  6:43 AM   Result Value Ref Range    Glucose (POC) 386 (H) 70 - 110 mg/dL       Scheduled Medications Reviewed:  Current Facility-Administered Medications Medication Dose Route Frequency    influenza vaccine 2019-20 (6 mos+)(PF) (FLUARIX/FLULAVAL/FLUZONE QUAD) injection 0.5 mL  0.5 mL IntraMUSCular PRIOR TO DISCHARGE    insulin glargine (LANTUS) injection 35 Units  35 Units SubCUTAneous QHS    aspirin delayed-release tablet 81 mg  81 mg Oral DAILY    fluticasone propionate (FLONASE) 50 mcg/actuation nasal spray 2 Spray  2 Spray Both Nostrils DAILY    hydroCHLOROthiazide (HYDRODIURIL) tablet 12.5 mg  12.5 mg Oral DAILY    lisinopril (PRINIVIL, ZESTRIL) tablet 20 mg  20 mg Oral BID    pantoprazole (PROTONIX) tablet 40 mg  40 mg Oral ACB    tiotropium (SPIRIVA) inhalation capsule 18 mcg  1 Cap Inhalation DAILY    enoxaparin (LOVENOX) injection 40 mg  40 mg SubCUTAneous Q24H    methylPREDNISolone (PF) (SOLU-MEDROL) injection 60 mg  60 mg IntraVENous Q12H    albuterol-ipratropium (DUO-NEB) 2.5 MG-0.5 MG/3 ML  3 mL Nebulization Q4H RT    levoFLOXacin (LEVAQUIN) tablet 500 mg  500 mg Oral Q24H    insulin lispro (HUMALOG) injection   SubCUTAneous AC&HS         Imaging, microbiology, and EKG/Telemetry:      Assessment/Plan     60 y.o. female with PMH of COPD with home O2, SHERRIE with Trilogy at night, T2DM, HTN, GERD, allergic rhinitis, now admitted with acute COPD exacerbation.     Acute COPD exacerbation  - increased SOB and cough for 2 days  - admitted 8/2019 for COPD exacerbation and discharged on Prednisone taper  - followed by Dr. Baca Sessions as an outpatient  - home medications: Advair, Albuterol, Spiriva, Montelukast  - >12 nebulizer treatments with albuterol at home without relief  - ABG in ED on 2L O2 NC: pCO2 48.6, pO@ 75, HCO3 28.8  - CXR in ED: 1.  No acute process.  2.  Hyperinflated lungs suggestive of COPD  - CBC in ED: eosinophils 6%, MPV 9.1  - levofloxacin 750 mg IV x1 given in ED  - methylprednisolone 60 mg IV q12hrs 3 doses received     PLAN:  - Duo-nebs q4hrs  - supplemental O2 NC   - maintain SpO2 88-92%  - start prednisone 30 mg PO BID  - tiotropium (Spiriva) 18 mcg inhalation capsule daily  - levofloxacin 500 mg PO daily for 7 days  - continuous pulse oximetry  - pulmonary rehab  - cardiac monitoring     Obstructive sleep apnea  - followed by sleep medicine as outpatient  - uses Trilogy machine at night with 2L O2 at home  - sleeps in recliner or in bed with 4 pillows     PLAN:  - use home Trilogy machine while inpatient  - continuous pulse oximetry     Type 2 diabetes mellitus  - home medications: Basaglar 32 units at night, Novalog SSI  - per pt, home blood glucose 200-300 recently  - Hgb A1c in ED 8.3 (est. Avg. Glucose 192)  - Glucose in 300's     PLAN:  - diabetic diet  - Increase Lantus to 37 units nightly  - SSI  - POC glucose     Hypertension  - home medications: ASA, HCTZ, Lisinopril     PLAN:  - lisinopril 20 mg PO BID  - hydrochlorothiazide 12.5 mg PO daily  - ASA 81 mg PO daily  - monitor BP     GERD  - home medications: omeprazole and famotidine     PLAN:  - pantoprazole 40 mg PO daily     Allergic rhinitis  - home medication: fluticasone propionate 50 mcg     PLAN:  - fluticasone propionate 50 mcg 2 sprays in both notrils daily     Diet: diabetic  DVT Prophylaxis: Enoxaparin SubQ  Code Status: FULL  Point of Contact: Sterling Potts [phone] (relationship: daughter)     Disposition: home with home health    [id number] [place] [address] MS-4  10/16/19 8:19 AM  *ATTENTION:  This note has been created by a medical student for educational purposes only. Please do not refer to the content of this note for clinical decision-making, billing, or other purposes. Please see attending physicians note to obtain clinical information on this patient. *

## 2019-10-17 VITALS
HEART RATE: 62 BPM | BODY MASS INDEX: 43.72 KG/M2 | DIASTOLIC BLOOD PRESSURE: 70 MMHG | RESPIRATION RATE: 21 BRPM | OXYGEN SATURATION: 95 % | TEMPERATURE: 97.7 F | WEIGHT: 246.8 LBS | SYSTOLIC BLOOD PRESSURE: 137 MMHG

## 2019-10-17 LAB
ANION GAP SERPL CALC-SCNC: 7 MMOL/L (ref 3–18)
BUN SERPL-MCNC: 20 MG/DL (ref 7–18)
BUN/CREAT SERPL: 21 (ref 12–20)
CALCIUM SERPL-MCNC: 8.9 MG/DL (ref 8.5–10.1)
CHLORIDE SERPL-SCNC: 100 MMOL/L (ref 100–111)
CO2 SERPL-SCNC: 28 MMOL/L (ref 21–32)
CREAT SERPL-MCNC: 0.96 MG/DL (ref 0.6–1.3)
ERYTHROCYTE [DISTWIDTH] IN BLOOD BY AUTOMATED COUNT: 14.1 % (ref 11.6–14.5)
GLUCOSE BLD STRIP.AUTO-MCNC: 209 MG/DL (ref 70–110)
GLUCOSE BLD STRIP.AUTO-MCNC: 217 MG/DL (ref 70–110)
GLUCOSE SERPL-MCNC: 249 MG/DL (ref 74–99)
HCT VFR BLD AUTO: 35.2 % (ref 35–45)
HGB BLD-MCNC: 11.6 G/DL (ref 12–16)
MCH RBC QN AUTO: 29.1 PG (ref 24–34)
MCHC RBC AUTO-ENTMCNC: 33 G/DL (ref 31–37)
MCV RBC AUTO: 88.4 FL (ref 74–97)
PLATELET # BLD AUTO: 300 K/UL (ref 135–420)
PMV BLD AUTO: 9.3 FL (ref 9.2–11.8)
POTASSIUM SERPL-SCNC: 3.9 MMOL/L (ref 3.5–5.5)
RBC # BLD AUTO: 3.98 M/UL (ref 4.2–5.3)
SODIUM SERPL-SCNC: 135 MMOL/L (ref 136–145)
WBC # BLD AUTO: 12 K/UL (ref 4.6–13.2)

## 2019-10-17 PROCEDURE — 74011636637 HC RX REV CODE- 636/637: Performed by: STUDENT IN AN ORGANIZED HEALTH CARE EDUCATION/TRAINING PROGRAM

## 2019-10-17 PROCEDURE — 82962 GLUCOSE BLOOD TEST: CPT

## 2019-10-17 PROCEDURE — 74011250637 HC RX REV CODE- 250/637: Performed by: STUDENT IN AN ORGANIZED HEALTH CARE EDUCATION/TRAINING PROGRAM

## 2019-10-17 PROCEDURE — 85027 COMPLETE CBC AUTOMATED: CPT

## 2019-10-17 PROCEDURE — 80048 BASIC METABOLIC PNL TOTAL CA: CPT

## 2019-10-17 PROCEDURE — 74011636637 HC RX REV CODE- 636/637: Performed by: FAMILY MEDICINE

## 2019-10-17 PROCEDURE — 74011000250 HC RX REV CODE- 250: Performed by: STUDENT IN AN ORGANIZED HEALTH CARE EDUCATION/TRAINING PROGRAM

## 2019-10-17 PROCEDURE — 36415 COLL VENOUS BLD VENIPUNCTURE: CPT

## 2019-10-17 PROCEDURE — 94640 AIRWAY INHALATION TREATMENT: CPT

## 2019-10-17 RX ORDER — LEVOFLOXACIN 500 MG/1
500 TABLET, FILM COATED ORAL EVERY 24 HOURS
Qty: 4 TAB | Refills: 0 | Status: SHIPPED | OUTPATIENT
Start: 2019-10-17 | End: 2020-02-03 | Stop reason: ALTCHOICE

## 2019-10-17 RX ORDER — IPRATROPIUM BROMIDE AND ALBUTEROL SULFATE 2.5; .5 MG/3ML; MG/3ML
3 SOLUTION RESPIRATORY (INHALATION)
Qty: 30 NEBULE | Refills: 0 | Status: SHIPPED | OUTPATIENT
Start: 2019-10-17 | End: 2020-03-05

## 2019-10-17 RX ORDER — INSULIN GLARGINE 100 [IU]/ML
INJECTION, SOLUTION SUBCUTANEOUS
Qty: 1 VIAL | Refills: 0 | Status: SHIPPED | OUTPATIENT
Start: 2019-10-17 | End: 2020-03-10

## 2019-10-17 RX ORDER — PREDNISONE 10 MG/1
TABLET ORAL
Qty: 131 TAB | Refills: 0 | OUTPATIENT
Start: 2019-10-17 | End: 2020-01-11

## 2019-10-17 RX ADMIN — IPRATROPIUM BROMIDE AND ALBUTEROL SULFATE 3 ML: .5; 3 SOLUTION RESPIRATORY (INHALATION) at 03:21

## 2019-10-17 RX ADMIN — FLUTICASONE PROPIONATE 2 SPRAY: 50 SPRAY, METERED NASAL at 08:22

## 2019-10-17 RX ADMIN — IPRATROPIUM BROMIDE AND ALBUTEROL SULFATE 3 ML: .5; 3 SOLUTION RESPIRATORY (INHALATION) at 09:08

## 2019-10-17 RX ADMIN — INSULIN LISPRO 6 UNITS: 100 INJECTION, SOLUTION INTRAVENOUS; SUBCUTANEOUS at 11:48

## 2019-10-17 RX ADMIN — INSULIN LISPRO 6 UNITS: 100 INJECTION, SOLUTION INTRAVENOUS; SUBCUTANEOUS at 08:20

## 2019-10-17 RX ADMIN — IPRATROPIUM BROMIDE AND ALBUTEROL SULFATE 3 ML: .5; 3 SOLUTION RESPIRATORY (INHALATION) at 00:39

## 2019-10-17 RX ADMIN — PANTOPRAZOLE SODIUM 40 MG: 40 TABLET, DELAYED RELEASE ORAL at 08:14

## 2019-10-17 RX ADMIN — PREDNISONE 30 MG: 20 TABLET ORAL at 08:15

## 2019-10-17 RX ADMIN — LISINOPRIL 20 MG: 20 TABLET ORAL at 08:15

## 2019-10-17 RX ADMIN — ASPIRIN 81 MG: 81 TABLET, COATED ORAL at 08:15

## 2019-10-17 RX ADMIN — IPRATROPIUM BROMIDE AND ALBUTEROL SULFATE 3 ML: .5; 3 SOLUTION RESPIRATORY (INHALATION) at 12:40

## 2019-10-17 RX ADMIN — HYDROCHLOROTHIAZIDE 12.5 MG: 25 TABLET ORAL at 08:16

## 2019-10-17 NOTE — PROGRESS NOTES
Thank you for your referral for a nebulizer-patient received a nebulizer from [facility] 4/2019. Patient said she does not need nebulizer because she has one at home.      Kofi E Vinny Bonds Liaison  [facility]

## 2019-10-17 NOTE — PROGRESS NOTES
Intern Progress Note  [facility]       Patient: Momo Blair MRN: [id number]  [facility]: [id number]    YOB: 1960  Age: 60 y.o. Sex: female    DOA: 10/14/2019 LOS:  LOS: 3 days                    Subjective:     Acute events: No acute events overnight, patient says she feels ok to go home. She says her sugars have been better and she is ok with steroids as long as it isn't for too long because it she gets irritable on steroids. She has some abdominal pain from a hernia when she coughs. Review of Systems   Constitutional: Negative for chills and fever. Eyes: Negative for blurred vision. Respiratory: Positive for cough and wheezing. Negative for shortness of breath. Cardiovascular: Negative for chest pain and palpitations. Gastrointestinal: Positive for abdominal pain. Negative for nausea and vomiting. Neurological: Negative for dizziness and headaches. Objective:      Patient Vitals for the past 24 hrs:   Temp Pulse Resp BP SpO2   10/17/19 0801 97.5 °F (36.4 °C) 69 16 146/82 95 %   10/17/19 0432 97.5 °F (36.4 °C) 80 19 104/54 92 %   10/17/19 0322 -- -- -- -- 94 %   10/17/19 0041 -- -- -- -- 93 %   10/17/19 0012 97.6 °F (36.4 °C) 81 19 148/78 93 %   10/16/19 2009 98 °F (36.7 °C) 72 19 148/77 100 %   10/16/19 1935 -- -- -- -- 99 %   10/16/19 1610 -- -- -- -- 99 %   10/16/19 1600 97 °F (36.1 °C) 80 18 144/78 97 %   10/16/19 1200 97.6 °F (36.4 °C) 75 18 143/74 97 %   10/16/19 0821 97.2 °F (36.2 °C) 88 18 113/68 97 %         Intake/Output Summary (Last 24 hours) at 10/17/2019 7453  Last data filed at 10/17/2019 0439  Gross per 24 hour   Intake 1080 ml   Output 4250 ml   Net -3170 ml        Physical Exam:  General: wearing c-pap when I entered the room  Constitutional: She is oriented to person, place, and time. She appears well-nourished. She is in no acute distress   HENT:   Head: Normocephalic and atraumatic.    Eyes: Pupils are equal, round, and reactive to light.   Neck: Normal range of motion. No JVD present. Cardiovascular: Normal rate, regular rhythm and normal heart sounds. Pulmonary/Chest: No increased respiratory effort, normal rate and equal expansion bilaterally. She has end expiratory wheezes worse in lower right lung fields.   Abdominal: Soft. She exhibits no distension. Musculoskeletal: She exhibits no edema. Neurological: She is alert and oriented to person, place, and time. Skin: Skin is warm and dry. She is not diaphoretic.    Psychiatric: She has a normal mood and affect.     Lab/Data Reviewed:    Recent Results (from the past 12 hour(s))   GLUCOSE, POC    Collection Time: 10/16/19  9:26 PM   Result Value Ref Range    Glucose (POC) 310 (H) 70 - 110 mg/dL   CBC W/O DIFF    Collection Time: 10/17/19  5:10 AM   Result Value Ref Range    WBC 12.0 4.6 - 13.2 K/uL    RBC 3.98 (L) 4.20 - 5.30 M/uL    HGB 11.6 (L) 12.0 - 16.0 g/dL    HCT 35.2 35.0 - 45.0 %    MCV 88.4 74.0 - 97.0 FL    MCH 29.1 24.0 - 34.0 PG    MCHC 33.0 31.0 - 37.0 g/dL    RDW 14.1 11.6 - 14.5 %    PLATELET 368 760 - 911 K/uL    MPV 9.3 9.2 - 09.8 FL   METABOLIC PANEL, BASIC    Collection Time: 10/17/19  5:10 AM   Result Value Ref Range    Sodium 135 (L) 136 - 145 mmol/L    Potassium 3.9 3.5 - 5.5 mmol/L    Chloride 100 100 - 111 mmol/L    CO2 28 21 - 32 mmol/L    Anion gap 7 3.0 - 18 mmol/L    Glucose 249 (H) 74 - 99 mg/dL    BUN 20 (H) 7.0 - 18 MG/DL    Creatinine 0.96 0.6 - 1.3 MG/DL    BUN/Creatinine ratio 21 (H) 12 - 20      GFR est AA >60 >60 ml/min/1.73m2    GFR est non-AA 59 (L) >60 ml/min/1.73m2    Calcium 8.9 8.5 - 10.1 MG/DL   GLUCOSE, POC    Collection Time: 10/17/19  7:11 AM   Result Value Ref Range    Glucose (POC) 217 (H) 70 - 110 mg/dL         Scheduled Medications Reviewed:  Current Facility-Administered Medications   Medication Dose Route Frequency    insulin glargine (LANTUS) injection 37 Units  37 Units SubCUTAneous QHS    predniSONE (DELTASONE) tablet 30 mg  30 mg Oral BID WITH MEALS    influenza vaccine 2019-20 (6 mos+)(PF) (FLUARIX/FLULAVAL/FLUZONE QUAD) injection 0.5 mL  0.5 mL IntraMUSCular PRIOR TO DISCHARGE    aspirin delayed-release tablet 81 mg  81 mg Oral DAILY    fluticasone propionate (FLONASE) 50 mcg/actuation nasal spray 2 Spray  2 Spray Both Nostrils DAILY    hydroCHLOROthiazide (HYDRODIURIL) tablet 12.5 mg  12.5 mg Oral DAILY    lisinopril (PRINIVIL, ZESTRIL) tablet 20 mg  20 mg Oral BID    pantoprazole (PROTONIX) tablet 40 mg  40 mg Oral ACB    tiotropium (SPIRIVA) inhalation capsule 18 mcg  1 Cap Inhalation DAILY    enoxaparin (LOVENOX) injection 40 mg  40 mg SubCUTAneous Q24H    albuterol-ipratropium (DUO-NEB) 2.5 MG-0.5 MG/3 ML  3 mL Nebulization Q4H RT    levoFLOXacin (LEVAQUIN) tablet 500 mg  500 mg Oral Q24H    insulin lispro (HUMALOG) injection   SubCUTAneous AC&HS     Assessment/Plan   Lauri Curran  is a 60 y.o. female with PMH of COPD on home O2, IDDM2, HTN, HFpEF, SHERRIE on CPAP, GERD, allergic rhinitis, now presenting with complaint of SOB.     Acute on Chronic Respiratory Failure with hypercapnia  COPD Exacerbation  -previous hospitalization 8/30/19-9/5/19 for COPD exacerbation  -ABG in ED 7.38/49/75/29  -home meds: prednisone 10mg every other day, albuterol neb, spiriva,  -home oxygen 2L when active, trelegy nightly  -Flu negative  Plan  -titrate oxygen to goal oxygen saturation of 88-92%  -duoneb scheduled q4h  -levofloxacin 750mg PO   -PO prednisone 30 q12  -Home today with home health and home neb machine     Subjective Dysphagia, not concerning  -speech swallow evaluated, advised patient and signed off     SHERRIE/pulm htn  -trelegy nightly     Insuline dependent Type 2 Diabetes  - Eevated glucoses overnight  -home lantus 35u qhs increase today to 37U while decreasing steroid dose, novolog SSI  -monitor blood glucose while on steroids  -continue home lantus     Hypertension, HFpEF  -home lisinopril 20mg bid, HCTZ 12.5mg  -echo 7/7/18 EF 66%     GERD  -pepcid 20mg     Allergic Rhinitis  -home zyrtec, flonase, singulair 10mg     Diet: diabetic  DVT Prophylaxis: lovenox  Code Status: Full Code  [place] of [address], daughter, [phone]     Disposition and anticipated LOS: +/- 2 midnights, home today with home health    Harjinder Shultz Intern  10/17/19 8:08 AM

## 2019-10-17 NOTE — PROGRESS NOTES
Intern Progress Note [address] This is a medical student note Patient: Rosa Sandoval MRN: [id number]  CSN: [id number] YOB: 1960  Age: 60 y.o. Sex: female DOA: 10/14/2019 LOS:  LOS: 3 days Subjective:  
 
Acute events: No calls overnight. *** 
 
ROS Constitutional: denies fevers, chills, fatigue. HEENT: denies HA, changes in vision, changes in hearing, difficulty swallowing. Cardiovascular: denies chest pain, palpitations, edema. Pulmonary: denies cough, SOB, wheezing. Gastrointestinal: denies diarrhea, constipation, abdominal pain. Genitourinary: denies hematuria, dysuria, urgency, frequency. Skin: denies rashes, lesions, ulcers. Musculoskeletal: denies joint pain, decreased ROM, myalgias. Neurological: denies HA, numbness, tingling, weakness. Psychological: denies depressed mood, anxiety, changes in thought content. Objective:  
  
Patient Vitals for the past 24 hrs: 
 Temp Pulse Resp BP SpO2  
10/17/19 0432 97.5 °F (36.4 °C) 80 19 104/54 92 % 10/17/19 0322     94 % 10/17/19 0041     93 % 10/17/19 0012 97.6 °F (36.4 °C) 81 19 148/78 93 % 10/16/19 2009 98 °F (36.7 °C) 72 19 148/77 100 % 10/16/19 1935     99 % 10/16/19 1610     99 % 10/16/19 1600 97 °F (36.1 °C) 80 18 144/78 97 % 10/16/19 1200 97.6 °F (36.4 °C) 75 18 143/74 97 % 10/16/19 0821 97.2 °F (36.2 °C) 88 18 113/68 97 % Intake/Output Summary (Last 24 hours) at 10/17/2019 0750 Last data filed at 10/17/2019 7101 Gross per 24 hour Intake 1080 ml Output 4250 ml Net -3170 ml Physical Exam: *** General:  Alert and Responsive and in No acute distress. HEENT: Conjunctiva pink, sclera anicteric. EOMI. Pharynx moist, nonerythematous. Moist mucous membranes. Thyroid not enlarged, no nodules. No cervical, supraclavicular, occipital or submandibular lymphadenopathy. No other gross abnormalities present. CV:  RRR, no murmurs. No visible pulsations or thrills. RESP:  Unlabored breathing. Lungs clear to auscultation. no wheeze, rales, or rhonchi. Equal expansion bilaterally. ABD:  Soft, nontender, nondistended. No hepatosplenomegaly. No suprapubic tenderness. No CVA tenderness. RECTAL:  No masses or hemorrhoids. Hemoccult ***. 
MS:  No joint deformity or instability. No atrophy. Neuro:  5/5 strength bilateral upper extremities and lower extremities. A+Ox3. Ext:  No edema. 2+ radial and dp pulses bilaterally. Skin:  No rashes, lesions, or ulcers. Good turgor. Lines:  
Drains: Airway:  
 
Lab/Data Reviewed: 
Recent Results (from the past 24 hour(s)) GLUCOSE, POC Collection Time: 10/16/19 12:08 PM  
Result Value Ref Range Glucose (POC) 350 (H) 70 - 110 mg/dL GLUCOSE, POC Collection Time: 10/16/19  4:43 PM  
Result Value Ref Range Glucose (POC) 371 (H) 70 - 110 mg/dL GLUCOSE, POC Collection Time: 10/16/19  9:26 PM  
Result Value Ref Range Glucose (POC) 310 (H) 70 - 110 mg/dL CBC W/O DIFF Collection Time: 10/17/19  5:10 AM  
Result Value Ref Range WBC 12.0 4.6 - 13.2 K/uL  
 RBC 3.98 (L) 4.20 - 5.30 M/uL  
 HGB 11.6 (L) 12.0 - 16.0 g/dL HCT 35.2 35.0 - 45.0 % MCV 88.4 74.0 - 97.0 FL  
 MCH 29.1 24.0 - 34.0 PG  
 MCHC 33.0 31.0 - 37.0 g/dL  
 RDW 14.1 11.6 - 14.5 % PLATELET 123 551 - 657 K/uL MPV 9.3 9.2 - 52.9 FL  
METABOLIC PANEL, BASIC Collection Time: 10/17/19  5:10 AM  
Result Value Ref Range Sodium 135 (L) 136 - 145 mmol/L Potassium 3.9 3.5 - 5.5 mmol/L Chloride 100 100 - 111 mmol/L  
 CO2 28 21 - 32 mmol/L Anion gap 7 3.0 - 18 mmol/L Glucose 249 (H) 74 - 99 mg/dL BUN 20 (H) 7.0 - 18 MG/DL Creatinine 0.96 0.6 - 1.3 MG/DL  
 BUN/Creatinine ratio 21 (H) 12 - 20 GFR est AA >60 >60 ml/min/1.73m2 GFR est non-AA 59 (L) >60 ml/min/1.73m2 Calcium 8.9 8.5 - 10.1 MG/DL  
GLUCOSE, POC  Collection Time: 10/17/19  7:11 AM  
 Result Value Ref Range Glucose (POC) 217 (H) 70 - 110 mg/dL Scheduled Medications Reviewed: 
Current Facility-Administered Medications Medication Dose Route Frequency  insulin glargine (LANTUS) injection 37 Units  37 Units SubCUTAneous QHS  predniSONE (DELTASONE) tablet 30 mg  30 mg Oral BID WITH MEALS  influenza vaccine 2019-20 (6 mos+)(PF) (FLUARIX/FLULAVAL/FLUZONE QUAD) injection 0.5 mL  0.5 mL IntraMUSCular PRIOR TO DISCHARGE  aspirin delayed-release tablet 81 mg  81 mg Oral DAILY  fluticasone propionate (FLONASE) 50 mcg/actuation nasal spray 2 Spray  2 Spray Both Nostrils DAILY  hydroCHLOROthiazide (HYDRODIURIL) tablet 12.5 mg  12.5 mg Oral DAILY  lisinopril (PRINIVIL, ZESTRIL) tablet 20 mg  20 mg Oral BID  pantoprazole (PROTONIX) tablet 40 mg  40 mg Oral ACB  tiotropium (SPIRIVA) inhalation capsule 18 mcg  1 Cap Inhalation DAILY  enoxaparin (LOVENOX) injection 40 mg  40 mg SubCUTAneous Q24H  
 albuterol-ipratropium (DUO-NEB) 2.5 MG-0.5 MG/3 ML  3 mL Nebulization Q4H RT  
 levoFLOXacin (LEVAQUIN) tablet 500 mg  500 mg Oral Q24H  
 insulin lispro (HUMALOG) injection   SubCUTAneous AC&HS Imaging, microbiology, and EKG/Telemetry: 
 
 
Assessment/Plan  
 
60 y.o. female with PMH of COPD with home O2, SHERRIE with Trilogy at night, T2DM, HTN, GERD, allergic rhinitis, now admitted with acute COPD exacerbation. 
  
Acute COPD exacerbation 
- increased SOB and cough for 2 days 
- admitted 8/2019 for COPD exacerbation and discharged on Prednisone taper 
- followed by Dr. Carole Aguirre as an outpatient 
- home medications: Advair, Albuterol, Spiriva, Montelukast 
- >12 nebulizer treatments with albuterol at home without relief - ABG in ED on 2L O2 NC: pCO2 48.6, pO@ 75, HCO3 28.8 
- CXR in ED: 1.  No acute process.  2.  Hyperinflated lungs suggestive of COPD 
- CBC in ED: eosinophils 6%, MPV 9.1 
- levofloxacin 750 mg IV x1 given in ED 
 - methylprednisolone 60 mg IV q12hrs 3 doses received 
  PLAN: 
- Duo-nebs q4hrs 
- supplemental O2 NC  
- maintain SpO2 88-92% - prednisone 30 mg PO BID 
- tiotropium (Spiriva) 18 mcg inhalation capsule daily - levofloxacin 500 mg PO daily for 7 days 
- continuous pulse oximetry - pulmonary rehab 
- cardiac monitoring 
- home health with pulmonary rehab 
- DME for home nebulizer 
  
Obstructive sleep apnea 
- followed by sleep medicine as outpatient 
- uses Trilogy machine at night with 2L O2 at home 
- sleeps in recliner or in bed with 4 pillows 
  PLAN: 
- use home Trilogy machine while inpatient 
- continuous pulse oximetry 
  
Type 2 diabetes mellitus 
- home medications: Basaglar 32 units at night, Novalog SSI 
- per pt, home blood glucose 200-300 recently 
- Hgb A1c in ED 8.3 (est. Avg. Glucose 192) - Glucose in 300's, now down to 217 
  
PLAN: 
- diabetic diet - Lantus to 37 units nightly 
- SSI 
- POC glucose 
  
Hypertension 
- home medications: ASA, HCTZ, Lisinopril 
  
PLAN: 
- lisinopril 20 mg PO BID 
- hydrochlorothiazide 12.5 mg PO daily - ASA 81 mg PO daily 
- monitor BP 
  
GERD 
- home medications: omeprazole and famotidine 
  
PLAN: 
- pantoprazole 40 mg PO daily 
  
Allergic rhinitis 
- home medication: fluticasone propionate 50 mcg 
  PLAN: 
- fluticasone propionate 50 mcg 2 sprays in both notrils daily 
  
Diet: diabetic DVT Prophylaxis: Enoxaparin SubQ Code Status: FULL [place] [facility] [address] [phone] (relationship: daughter) Disposition: home with home health. Nicolasjuan Nation Kelli Carrillo Chapman MS-4 
10/17/19 7:50 AM 
*ATTENTION:  This note has been created by a medical student for educational purposes only. Please do not refer to the content of this note for clinical decision-making, billing, or other purposes. Please see attending physicians note to obtain clinical information on this patient. *

## 2019-10-17 NOTE — PROGRESS NOTES
Pt has been seen 2 times for therapy and reports she is back at her baseline for mobility. She is performing all mobility independently including gait to and from the bathroom. Pt step up and down off the scale today with PT present no issues or LOB. Pt reports no further inpatient PT needs. She may benefit from outpatient pulmonary rehab vs home health after the hospital. D/C pt from PT at this time.      Thank you for this referral.  Marnie Pool, PT, DPT

## 2019-10-17 NOTE — ROUTINE PROCESS
Bedside and Verbal shift change report given to Kimberly RN (oncoming nurse) by Renetta Butterfield RN (offgoing nurse). Report given with SBAR, Kardex, Intake/Output, MAR, Accordion and Recent Results.

## 2019-10-17 NOTE — PROGRESS NOTES
Pt has discharge orders, home health orders and order for nebulizer. Pt stated she does not want home health and also she has a nebulizer at home.         TRAVIS MathewsN RN  Care Management  Pager: [phone]

## 2019-10-17 NOTE — DISCHARGE INSTRUCTIONS
Patient Education        COPD Exacerbation Plan: Care Instructions  Your Care Instructions    If you have chronic obstructive pulmonary disease (COPD), your usual shortness of breath could suddenly get worse. You may start coughing more and have more mucus. This flare-up is called a COPD exacerbation (say \"au-SER-yy-BAY-rosi\"). A lung infection or air pollution could set off an exacerbation. Sometimes it can happen after a quick change in temperature or being around chemicals. Work with your doctor to make a plan for dealing with an exacerbation. You can better manage it if you plan ahead. Follow-up care is a key part of your treatment and safety. Be sure to make and go to all appointments, and call your doctor if you are having problems. It's also a good idea to know your test results and keep a list of the medicines you take. How can you care for yourself at home? During an exacerbation  · Do not panic if you start to have one. Quick treatment at home may help you prevent serious breathing problems. If you have a COPD exacerbation plan that you developed with your doctor, follow it. · Take your medicines exactly as your doctor tells you.  ? Use your inhaler as directed by your doctor. If your symptoms do not get better after you use your medicine, have someone take you to the emergency room. Call an ambulance if necessary. ? With inhaled medicines, a spacer or a nebulizer may help you get more medicine to your lungs. Ask your doctor or pharmacist how to use them properly. Practice using the spacer in front of a mirror before you have an exacerbation. This may help you get the medicine into your lungs quickly. ? If your doctor has given you steroid pills, take them as directed. ? Your doctor may have given you a prescription for antibiotics, which you can fill if you need it. ? Talk to your doctor if you have any problems with your medicine.  And call your doctor if you have to use your antibiotic or steroid pills. Preventing an exacerbation  · Do not smoke. This is the most important step you can take to prevent more damage to your lungs and prevent problems. If you already smoke, it is never too late to stop. If you need help quitting, talk to your doctor about stop-smoking programs and medicines. These can increase your chances of quitting for good. · Take your daily medicines as prescribed. · Avoid colds and flu. ? Get a pneumococcal vaccine. ? Get a flu vaccine each year, as soon as it is available. Ask those you live or work with to do the same, so they will not get the flu and infect you. ? Try to stay away from people with colds or the flu. ? Wash your hands often. · Avoid secondhand smoke; air pollution; cold, dry air; hot, humid air; and high altitudes. Stay at home with your windows closed when air pollution is bad. · Learn breathing techniques for COPD, such as breathing through pursed lips. These techniques can help you breathe easier during an exacerbation. When should you call for help? Call 911 anytime you think you may need emergency care. For example, call if:    · You have severe trouble breathing.     · You have severe chest pain.    Call your doctor now or seek immediate medical care if:    · You have new or worse shortness of breath.     · You develop new chest pain.     · You are coughing more deeply or more often, especially if you notice more mucus or a change in the color of your mucus.     · You cough up blood.     · You have new or increased swelling in your legs or belly.     · You have a fever.    Watch closely for changes in your health, and be sure to contact your doctor if:    · You need to use your antibiotic or steroid pills.     · Your symptoms are getting worse. Where can you learn more? Go to http://etelvina-olivier.info/. Enter H042 in the search box to learn more about \"COPD Exacerbation Plan: Care Instructions. \"  Current as of: June 9, 2019  Content Version: 12.2  © 8587-6868 [employer]. Care instructions adapted under license by [employer] (which disclaims liability or warranty for this information). If you have questions about a medical condition or this instruction, always ask your healthcare professional. [address] any warranty or liability for your use of this information. Patient armband removed and shredded  MyChart Activation    Thank you for requesting access to Moku. Please follow the instructions below to securely access and download your online medical record. Moku allows you to send messages to your doctor, view your test results, renew your prescriptions, schedule appointments, and more. How Do I Sign Up? 1. In your internet browser, go to [email or web address]  2. Click on the First Time User? Click Here link in the Sign In box. You will be redirect to the New Member Sign Up page. 3. Enter your Moku Access Code exactly as it appears below. You will not need to use this code after youve completed the sign-up process. If you do not sign up before the expiration date, you must request a new code. Moku Access Code: [email or web address]  Expires: 2019  1:10 PM (This is the date your Moku access code will )    4. Enter the last four digits of your Social Security Number (xxxx) and Date of Birth (mm/dd/yyyy) as indicated and click Submit. You will be taken to the next sign-up page. 5. Create a Moku ID. This will be your Moku login ID and cannot be changed, so think of one that is secure and easy to remember. 6. Create a Moku password. You can change your password at any time. 7. Enter your Password Reset Question and Answer. This can be used at a later time if you forget your password. 8. Enter your e-mail address. You will receive e-mail notification when new information is available in [address] 9. Click Sign Up.  You can now view and download portions of your medical record. 10. Click the Download Summary menu link to download a portable copy of your medical information. Additional Information    If you have questions, please visit the Frequently Asked Questions section of the [facility] website at [email or web address] [email or web address]/. Remember, GeoPage[employer] is NOT to be used for urgent needs. For medical emergencies, dial 911. DISCHARGE SUMMARY from Nurse    PATIENT INSTRUCTIONS:    What to do at Home:  Recommended activity: Activity as tolerated,     If you experience any of the following symptoms chest pain, shortness of breath, fever, chills, elevated temperature greater than 100.9 F, a chronic cough that may produce mucus (sputum) that may be clear, white, yellow or greenish,Wheezing. excessive nausea or vomiting, please follow up with nearest Emergency room. *  Please give a list of your current medications to your Primary Care Provider. *  Please update this list whenever your medications are discontinued, doses are      changed, or new medications (including over-the-counter products) are added. *  Please carry medication information at all times in case of emergency situations. These are general instructions for a healthy lifestyle:    No smoking/ No tobacco products/ Avoid exposure to second hand smoke  Surgeon General's Warning:  Quitting smoking now greatly reduces serious risk to your health. Obesity, smoking, and sedentary lifestyle greatly increases your risk for illness    A healthy diet, regular physical exercise & weight monitoring are important for maintaining a healthy lifestyle    You may be retaining fluid if you have a history of heart failure or if you experience any of the following symptoms:  Weight gain of 3 pounds or more overnight or 5 pounds in a week, increased swelling in our hands or feet or shortness of breath while lying flat in bed.   Please call your doctor as soon as you notice any of these symptoms; do not wait until your next office visit. The discharge information has been reviewed with the patient. The patient verbalized understanding. Discharge medications reviewed with the patient and appropriate educational materials and side effects teaching were provided.   ___________________________________________________________________________________________________________________________________

## 2019-10-17 NOTE — DISCHARGE SUMMARY
[facility]  Discharge Summary    Patient: Arnulfo Ruth MRN: [id number]  CSN: [id number]    YOB: 1960  Age: 60 y.o. Sex: female      Admission Date: 10/14/2019 Discharge Date: 10/17/19   Attending: Charla Matthews PCP: Rafy Pastor MD     =========================================================  Reason for Admission: Acute exacerbation of COPD with asthma ([facility] [address] [J44.1, J45.901]  COP[facility] [J44.1]    Discharge Diagnoses:   Acute on Chronic Respiratory Failure - resolved  Subjective dysphagia - stable  SHERRIE/pulm htn -  stable  IDT2DM - stable  GERD - stable    Important notes to PCP/ follow-up studies and evaluations   Monitor A1c and glucose control after steroid taper ends  Ensure pt is only using one version of albuterol/LAMA    Pending labs and studies:  none    Operative Procedures:   none    Discharge Medications:     Discharge Medication List as of 10/17/2019  1:45 PM      START taking these medications    Details   insulin glargine (LANTUS) 100 unit/mL injection Take 37u QHS while on steroids, then return to 35u QHS when not taking increased steroids, Normal, Disp-1 Vial, R-0      albuterol-ipratropium (DUO-NEB) 2.5 mg-0.5 mg/3 ml nebu 3 mL by Nebulization route every four (4) hours as needed (SOB). , Print, Disp-30 Nebule, R-0      levoFLOXacin (LEVAQUIN) 500 mg tablet Take 1 Tab by mouth every twenty-four (24) hours. , Print, Disp-4 Tab, R-0      predniSONE (DELTASONE) 10 mg tablet Take 30mg BID for 7 days total, then 50mg daily for 7 days,40 mg daily for 7 days, 30mg daily for 7 days, 20mg daily for 7 days, 10 mg, Print, Disp-131 Tab, R-0         CONTINUE these medications which have NOT CHANGED    Details   simethicone (GAS-X) 125 mg capsule Take 125 mg by mouth four (4) times daily as needed for Flatulence., Historical Med      lisinopril (PRINIVIL, ZESTRIL) 20 mg tablet Take 20 mg by mouth two (2) times a day., Historical Med albuterol sulfate (PROVENTIL;VENTOLIN) 2.5 mg/0.5 mL nebu nebulizer solution 0.5 mL by Nebulization route four (4) times daily as needed for Wheezing. To be used with HyperSal nebulizer solution. ICD code: COPD J44.1, Print, Disp-60 mL, R-3      fluticasone propionate (FLONASE ALLERGY RELIEF) 50 mcg/actuation nasal spray 2 Sprays by Both Nostrils route daily. , Historical Med      fluticasone propion-salmeterol (ADVAIR HFA) 230-21 mcg/actuation inhaler Take 2 Puffs by inhalation two (2) times a day., Historical Med      hydroCHLOROthiazide (HYDRODIURIL) 12.5 mg tablet Take 12.5 mg by mouth daily. , Historical Med      tiotropium bromide (SPIRIVA RESPIMAT) 2.5 mcg/actuation inhaler Take 2 Puffs by inhalation daily. , Historical Med      insulin glargine (LANTUS,BASAGLAR) 100 unit/mL (3 mL) inpn 35 Units by SubCUTAneous route nightly., Normal, Disp-28 Units, R-0      albuterol sulfate 90 mcg/actuation aepb Take 1 Puff by inhalation every four (4) hours. , Print, Disp-1 Inhaler, R-0      NOVOLOG FLEXPEN U-100 INSULIN 100 unit/mL inpn Continue home Sliding scale insulin as prior to admission, Print, Disp-1 Pen, R-0, ERLINDA      omeprazole (PRILOSEC) 40 mg capsule Take 40 mg by mouth daily. Indications: gastroesophageal reflux disease, Historical Med      cpap machine kit by Does Not Apply route nightly. M.E.D., Historical Med      OXYGEN-AIR DELIVERY SYSTEMS 2 L by Nasal route continuous. First Choice, Historical Med      aspirin delayed-release 81 mg tablet Take 81 mg by mouth daily. , Historical Med      famotidine (PEPCID) 20 mg tablet Take 20 mg by mouth two (2) times daily as needed for Other (reflux). , Historical Med      montelukast (SINGULAIR) 10 mg tablet Take 10 mg by mouth nightly., Historical Med      loratadine (CLARITIN) 10 mg tablet Take 10 mg by mouth daily as needed for Allergies. , Historical Med      HYPER-SAL 3.5 % nebu USE 1 VIAL IN NEBULIZER TWICE DAILY, Historical Med, R-6, ERLINDA             Disposition: Home with     Consultants:    none    Brief Hospital Course (including pertinent history and physical findings)  Summary:  Lissette Guerra is a 60 y.o. female with a history of COPD on home O2, IDDM2, HTN, HFpEF, SHERRIE on CPAP, GERD, allergic rhinitis, who presented to [facility] Emergency Department on 10/14/2019 with 2 days of cough and dyspnea. She has had multiple hospitalizations for COPD exacerbation in the past year. ED Course: In the ED, Ms. William Delcid appeared to be in respiratory distress. The ED administered albuterol, ipratropium, mag, solumedrol and levofloxacin. Labs were significant for ABG with mild hypercapnia, no leukocytosis/left shift, no metabolic disturbances, negative troponin, negative lipase and negative BNP. UA showed glucosuria. CXR showed hyperinflated lungs suggestive of COPD, otherwise no acute process. EKG with NRS, low voltage QRS. Admission Course:  COPD Exacerbation:  For her COPD, Ms. William Delcid has been on 2L oxygen at home when active and Trelegy at night and PRN. She has been compliant with home regimen of Prednisone 10mg every other day, albuterol nebs PRN and Spiriva. She follows with Dr. Keven Medina as an outpatient. In the hospital, she was treated with 1 dose IV Levaquin 750mg and was transitioned to PO for a total of 7 days, scheduled duonebs Q4H, Solumedrol 60mg Q12H, and continued on her home Trelegy PRN and QHS, home Spiriva. Oxygen was titrated to goal oxygen saturation of 88-92%. Daily CBCs and BMPs were followed. Influenza testing was negative and she was given an influenza vaccine. On day of discharge she felt strong and was seen walking around cleaning up her hospital room. She went home on PO Levaquin and was sent with duo nebs to use in place of her home tiotropium until she feels back to baseline. CURRENT ADMISSION IMAGING RESULTS   Xr Chest Pa Lat    Result Date: 10/14/2019  Impression: 1. No acute process.  2.  Hyperinflated lungs suggestive of COPD. Cardiology Procedures/Testing:  MODALITY RESULTS   EKG Results for orders placed or performed during the hospital encounter of 10/14/19   EKG, 12 LEAD, INITIAL   Result Value Ref Range    Ventricular Rate 73 BPM    Atrial Rate 73 BPM    P-R Interval 176 ms    QRS Duration 64 ms    Q-T Interval 374 ms    QTC Calculation (Bezet) 412 ms    Calculated P Axis 32 degrees    Calculated R Axis 31 degrees    Calculated T Axis 39 degrees    Diagnosis       Normal sinus rhythm  Low voltage QRS  Borderline ECG  When compared with ECG of 20-SEP-2019 18:19,  QRS duration has decreased  QT has shortened  Confirmed by Rachael Trotter MD, Reji Corrigan ([id number]) on 10/14/2019 11:59:06 AM         ECHO 07/06/18   ECHO ADULT COMPLETE 07/07/2018 7/7/2018    Narrative · Definity contrast was given to enhance imaging. · Left ventricular hyperdynamic systolic function. Estimated left   ventricular ejection fraction is 66 - 70%. Left ventricular mild   concentric hypertrophy observed. Normal left ventricular wall motion, no   regional wall motion abnormality noted. Age-appropriate left ventricular   diastolic function. · Right ventricle not well visualized. · Aortic valve is probably trileaflet. Signed by: Margaret Rollins MD      Nuclear Medicine Results from [facility] Encounter encounter on 09/09/13   NM LUNG PERFUSION W VENT    Impression IMPRESSION:    Low probability of pulmonary embolism. Results from [place] encounter on 09/04/13   TRANSCRIBED NUCLEAR MEDICINE   Results from Hospital Encounter encounter on 12/06/12   NM LUNG VENT/PERF    Impression IMPRESSION:    Very low probability for pulmonary embolus. IR No results found for this or any previous visit.    CATH       Special Testing/Procedures:  MODALITY RESULTS   MICRO All Micro Results     Procedure Component Value Units Date/Time    INFLUENZA A & B AG (RAPID TEST) [[id number]] Collected:  10/15/19 0320    Order Status:  Completed Specimen: Nasopharyngeal from Nasal washing Updated:  10/15/19 5157     Influenza A Antigen NEGATIVE         Comment: A negative result does not exclude influenza virus infection, seasonal or H1N1 due to suboptimal sensitivity. If influenza is circulating in your community, a diagnosis of influenza should be considered based on a patients clinical presentation and empiric antiviral treatment should be considered, if indicated. Influenza B Antigen NEGATIVE             ABG Lab Results   Component Value Date/Time    pH (POC) 7.381 10/14/2019 12:45 PM    pCO2 (POC) 48.6 (H) 10/14/2019 12:45 PM    pO2 (POC) 75 (L) 10/14/2019 12:45 PM    HCO3 (POC) 28.8 (H) 10/14/2019 12:45 PM    FIO2 (POC) 0.28 10/14/2019 12:45 PM      UA No results found for this or any previous visit. Laboratory Results:  LABORATORY RESULTS   HEMATOLOGY Lab Results   Component Value Date/Time    WBC 12.0 10/17/2019 05:10 AM    HGB 11.6 (L) 10/17/2019 05:10 AM    HCT 35.2 10/17/2019 05:10 AM    PLATELET 200 56/55/7797 05:10 AM    MCV 88.4 10/17/2019 05:10 AM       CHEMISTRIES Lab Results   Component Value Date/Time    Sodium 135 (L) 10/17/2019 05:10 AM    Potassium 3.9 10/17/2019 05:10 AM    Chloride 100 10/17/2019 05:10 AM    CO2 28 10/17/2019 05:10 AM    Anion gap 7 10/17/2019 05:10 AM    Glucose 249 (H) 10/17/2019 05:10 AM    BUN 20 (H) 10/17/2019 05:10 AM    Creatinine 0.96 10/17/2019 05:10 AM    BUN/Creatinine ratio 21 (H) 10/17/2019 05:10 AM    GFR est AA >60 10/17/2019 05:10 AM    GFR est non-AA 59 (L) 10/17/2019 05:10 AM    Calcium 8.9 10/17/2019 05:10 AM      HEPATIC FUNCTION Lab Results   Component Value Date/Time    Albumin 3.9 10/14/2019 12:00 PM    Bilirubin, direct <0.1 02/08/2017 10:00 PM    Bilirubin, total 0.6 10/14/2019 12:00 PM    Protein, total 8.2 10/14/2019 12:00 PM    Globulin 4.3 (H) 10/14/2019 12:00 PM    A-G Ratio 0.9 10/14/2019 12:00 PM    AST (SGOT) 16 10/14/2019 12:00 PM    ALT (SGPT) 42 10/14/2019 12:00 PM    Alk. phosphatase 106 10/14/2019 12:00 PM       LACTIC ACID Lab Results   Component Value Date/Time    Lactic acid 2.0 09/04/2019 06:10 AM    Lactic acid 3.1 (HH) 09/03/2019 09:39 PM    Lactic acid 3.1 (HH) 09/03/2019 04:56 PM      CARDIAC PANEL Lab Results   Component Value Date/Time    CK 90 09/20/2019 06:40 PM    CK - MB 1.3 09/20/2019 06:40 PM    CK-MB Index 1.4 09/20/2019 06:40 PM    Troponin-I, QT <0.02 10/14/2019 12:00 PM      NT-proBNP Lab Results   Component Value Date/Time    NT pro-BNP 41 10/14/2019 12:00 PM    NT pro-BNP 72 09/20/2019 06:40 PM    NT pro-BNP 55 05/27/2019 07:17 PM    NT pro-BNP 34 04/05/2019 07:12 PM    NT pro- 01/10/2019 01:05 PM      THYROID Lab Results   Component Value Date/Time    TSH 2.76 09/18/2016 02:20 PM      LIPID PANEL Lab Results   Component Value Date/Time    Cholesterol, total 220 (H) 11/20/2012 03:22 AM    HDL Cholesterol 62 (H) 11/20/2012 03:22 AM    LDL, calculated 144.6 (H) 11/20/2012 03:22 AM    VLDL, calculated 13.4 11/20/2012 03:22 AM    Triglyceride 67 11/20/2012 03:22 AM    CHOL/HDL Ratio 3.5 11/20/2012 03:22 AM         RISK CALCULATORS:  SCORE RESULT   ASCVD The ASCVD Risk score (Lucas Mcnair et al., 2013) failed to calculate for the following reasons:    ASCVD risk score not calculated    [id number]     HAS-BLED    READMISSION RISK SCORE High Risk            23       Total Score        3 Has Seen PCP in Last 6 Months (Yes=3, No=0)    11 IP Visits Last 12 Months (1-3=4, 4=9, >4=11)    9 Pt. Coverage (Medicare=5 , Medicaid, or Self-Pay=4)        Criteria that do not apply:    . Living with Significant Other. Assisted Living. LTAC. SNF.  or   Rehab    Patient Length of Stay (>5 days = 3)    Charlson Comorbidity Score (Age + Comorbid Conditions)           Functional status and cognitive function:    Ambulates with no assistance  Status: Alert, cooperative, in no distress  Condition: STABLE  Disposition: Home with prior home health    Diet: diabetic    Code status and advanced care plan: Full    Point of Contact Adam Mccormack, daughter, [phone]     Patient Education:  Patient was educated on the following topics prior to discharge: breathing exercises.      Follow-up:   Follow-up Information     Follow up With Specialties Details Why Contact Info    Anastasia Brito MD [facility] Go on 10/22/2019 At 140 PM [facility]      Benji Noonan MD Pulmonary Disease, Internal Medicine Schedule an appointment as soon as possible for a visit For hospital follow-up with pulmonologist [address] [place] [address],[address] [place] [address]  [phone]            ==============================================================  Nelly Mccollum MD, PGY-1   [address] Medicine   Intern Pager: [phone]   October 17, 2019, 5:47 PM

## 2019-10-18 NOTE — PROGRESS NOTES
Discharge instructions given verbally and written all questions answered IV, tele, and armband discontinued prescriptions given to be filled at pt's local pharmacy. Pt stated her sister will bring her portable oxygen tank from home to be used. Transported to the front entrance via w/c. As part of the discharge instructions, medications already given today were discussed with the patient. The next dose due of all ordered meds was highlighted as part of the medication discharge instructions. Discussed with the patient the importance of taking medications as directed, as well as the side effects and adverse reactions to medications ordered.

## 2019-11-05 ENCOUNTER — HOSPITAL ENCOUNTER (EMERGENCY)
Age: 59
Discharge: HOME | End: 2019-11-06
Attending: EMERGENCY MEDICINE
Payer: MEDICARE

## 2019-11-05 DIAGNOSIS — R06.02 SHORTNESS OF BREATH: ICD-10-CM

## 2019-11-05 DIAGNOSIS — J44.1 ACUTE EXACERBATION OF CHRONIC OBSTRUCTIVE PULMONARY DISEASE (COPD) (HCC): Primary | ICD-10-CM

## 2019-11-05 PROCEDURE — 96374 THER/PROPH/DIAG INJ IV PUSH: CPT

## 2019-11-05 PROCEDURE — 94640 AIRWAY INHALATION TREATMENT: CPT

## 2019-11-05 PROCEDURE — 99285 EMERGENCY DEPT VISIT HI MDM: CPT

## 2019-11-05 RX ORDER — IPRATROPIUM BROMIDE AND ALBUTEROL SULFATE 2.5; .5 MG/3ML; MG/3ML
3 SOLUTION RESPIRATORY (INHALATION)
Status: COMPLETED | OUTPATIENT
Start: 2019-11-05 | End: 2019-11-06

## 2019-11-05 NOTE — PROGRESS NOTES
Hospital Discharge Follow-Up        Patient listed on discharge ([facility]) report on 1/29/18. Patient hospitalized at [facility] 1/23-1/26/18 for acute exacerbation of COPD. RRAT score: 22 High    Medical History:     Past Medical History:   Diagnosis Date    Asthma     COPD     Cystocele, midline     Diabetes mellitus (HCC)     GERD (gastroesophageal reflux disease)     Hidradenitis suppurativa     Hyperlipidemia     Hypertension     SHERRIE on CPAP     CPAP    Stress incontinence        NN attempted to contact patient at listed number. VM left identifying self, direct contact information given with request for return call. NN will attempt to contact patient at later time. [Negative] : Sleep Disorder

## 2019-11-05 NOTE — PROGRESS NOTES
Intern Progress Note  [facility]       Patient: Dilip White MRN: [id number]  CSN: [id number]    YOB: 1960  Age: 60 y.o. Sex: female    DOA: 10/7/2018 LOS:  LOS: 4 days                    Subjective:     Acute events: No acute events overnight. Patient feels like she is breathing better and is at \"100%\" of her baseline. Is starting to have some productive cough that she says she expected \"as things start to get moving again. \"    Review of Systems   Constitutional: Negative for chills and fever. Respiratory: Positive for cough and sputum production. Negative for shortness of breath. Cardiovascular: Negative for chest pain. Gastrointestinal: Negative for abdominal pain, constipation, diarrhea, nausea and vomiting. Neurological: Negative for headaches. Psychiatric/Behavioral: Negative for depression. Objective:      Patient Vitals for the past 24 hrs:   Temp Pulse Resp BP SpO2   10/11/18 0414 97.8 °F (36.6 °C) 63 19 122/68 96 %   10/11/18 0351 - - - - 99 %   10/11/18 0115 - - - - 99 %   10/11/18 0003 97.8 °F (36.6 °C) 61 18 140/79 98 %   10/10/18 2035 98.7 °F (37.1 °C) - 17 142/72 97 %   10/10/18 2026 - - - - 94 %   10/10/18 1555 97.7 °F (36.5 °C) 71 18 150/77 94 %   10/10/18 1542 97.7 °F (36.5 °C) 71 20 150/77 94 %   10/10/18 1350 - - - - 93 %   10/10/18 1109 97.6 °F (36.4 °C) 69 16 156/84 97 %   10/10/18 1101 - - - - 96 %   10/10/18 0940 - - - - 96 %   10/10/18 0741 98 °F (36.7 °C) 60 16 152/80 96 %         Intake/Output Summary (Last 24 hours) at 10/11/18 0727  Last data filed at 10/10/18 2353   Gross per 24 hour   Intake              150 ml   Output                0 ml   Net              150 ml       Physical Exam:   General:  Alert and Responsive and in No acute distress. HEENT: Conjunctiva pink, sclera anicteric. EOMI. Pharynx moist, nonerythematous. Moist mucous membranes. CV:  RRR, no m/r/g  RESP:  Unlabored breathing.  LCTA w/ no wheeze, rales, or rhonchi. Equal expansion bilaterally. ABD:  Soft, nontender, nondistended. No hepatosplenomegaly. No suprapubic tenderness. No CVA tenderness. MS:  No joint deformity or instability. No atrophy. Neuro:  5/5 strength bilateral upper extremities and lower extremities. A+Ox3. Ext:  No edema. 2+ radial and dp pulses bilaterally. Skin:  No rashes, lesions, or ulcers. Good turgor.     Lab/Data Reviewed:  CMP:   Lab Results   Component Value Date/Time     10/11/2018 02:25 AM    K 3.6 10/11/2018 02:25 AM     10/11/2018 02:25 AM    CO2 26 10/11/2018 02:25 AM    AGAP 9 10/11/2018 02:25 AM     (H) 10/11/2018 02:25 AM    BUN 20 (H) 10/11/2018 02:25 AM    CREA 0.66 10/11/2018 02:25 AM    GFRAA >60 10/11/2018 02:25 AM    GFRNA >60 10/11/2018 02:25 AM    CA 8.6 10/11/2018 02:25 AM     CBC:   Lab Results   Component Value Date/Time    WBC 9.0 10/11/2018 02:25 AM    HGB 12.2 10/11/2018 02:25 AM    HCT 35.2 10/11/2018 02:25 AM     10/11/2018 02:25 AM        Scheduled Medications Reviewed:  Current Facility-Administered Medications   Medication Dose Route Frequency    predniSONE (DELTASONE) tablet 40 mg  40 mg Oral DAILY WITH BREAKFAST    azithromycin (ZITHROMAX) tablet 250 mg  250 mg Oral DAILY    lisinopril (PRINIVIL, ZESTRIL) tablet 20 mg  20 mg Oral ACB/HS    budesonide (PULMICORT) 1,000 mcg/2 mL nebulizer susp  1,000 mcg Nebulization BID RT    arformoterol (BROVANA) neb solution 15 mcg  15 mcg Nebulization BID RT    tiotropium (SPIRIVA) inhalation capsule 18 mcg  1 Cap Inhalation DAILY    aspirin delayed-release tablet 81 mg  81 mg Oral DAILY    famotidine (PEPCID) tablet 20 mg  20 mg Oral BID    fluticasone (FLONASE) 50 mcg/actuation nasal spray 2 Spray  2 Spray Both Nostrils DAILY    pantoprazole (PROTONIX) tablet 40 mg  40 mg Oral DAILY    enoxaparin (LOVENOX) injection 40 mg  40 mg SubCUTAneous Q24H    montelukast (SINGULAIR) tablet 10 mg  10 mg Oral QHS    insulin glargine (LANTUS) injection 30 Units  30 Units SubCUTAneous QHS    insulin lispro (HUMALOG) injection   SubCUTAneous AC&HS       Assessment/Plan   60 y.o. female with PMH COPD on home O2, HTN, DM, Diastolic CHF, SHERRIE now admitted with worsening sob and cough. Now being treated for COPD exacerbation.       Asthma-COPD Overlap syndrome - Improving  - Continue Brovana and Pulmicort nebulizers  - methylprednisolone 60 mg q6hr   - Started chronic azithromycin therapy 10/10/2018, 250 mg daily for 12 weeks per pulmonology, end date on January 2, 2018  - continue supplemental O2   - continue Singulair   - continue home flonase  - CPAP at night   - daily BMP, CBC  - pulmonology consulted - appreciate recommendations      Diastolic CHF  Stable on exam, no JVD, no peripheral edema. BNP 46. SOB and orthopnea likely in setting of current COPD exacerbation. Followed by Dr. Hayley Cox cardiology. Most recent echo 7/7/18 EF 66-70% with mild concentric LV hypertrophy. - continue telemetry   - continue aspirin   - continue lisinopril 40 mg daily       HTN   BP elevated to 774U systolic on admission. Improved to 140-150/70s  - continue home lisinopril 40 mg daily       DM -   Blood glucose elevated in setting of steroid use.  yesterday. Received 15 additional units of Lantus, w/ resolution to 186 this AM.  - continue glargine 30 U nightly   - SSI       Frontal sinus osteoma  Found on CT scan of sinuses during admission in July 2018. Non-obstructing. Patient followed by [facility]. Next appointment in November. - continue outpatient follow up with ENT      SHERRIE  - CPAP at night       GERD  - continue home Protonix and pepcid, and simethicone       Diet: Diabetic  DVT Prophylaxis: lovenox  Code Status: Full  Point of Contact: Kar Santamaria (daughter) [phone]      Disposition and anticipated LOS: 3 midnights. Plan to D/C home today.        MD Joe Cosme 54 PGY-1  10/11/18 11:39 AM Patient/Caregiver provided printed discharge information.

## 2019-11-06 ENCOUNTER — APPOINTMENT (OUTPATIENT)
Dept: GENERAL RADIOLOGY | Age: 59
End: 2019-11-06
Attending: PHYSICIAN ASSISTANT
Payer: MEDICARE

## 2019-11-06 VITALS
TEMPERATURE: 97.7 F | RESPIRATION RATE: 21 BRPM | OXYGEN SATURATION: 98 % | SYSTOLIC BLOOD PRESSURE: 139 MMHG | HEART RATE: 77 BPM | DIASTOLIC BLOOD PRESSURE: 81 MMHG

## 2019-11-06 LAB
ALBUMIN SERPL-MCNC: 3.7 G/DL (ref 3.4–5)
ALBUMIN/GLOB SERPL: 0.9 {RATIO} (ref 0.8–1.7)
ALP SERPL-CCNC: 77 U/L (ref 45–117)
ALT SERPL-CCNC: 31 U/L (ref 13–56)
ANION GAP SERPL CALC-SCNC: 5 MMOL/L (ref 3–18)
ARTERIAL PATENCY WRIST A: ABNORMAL
AST SERPL-CCNC: 8 U/L (ref 10–38)
BASE EXCESS BLDV CALC-SCNC: 2 MMOL/L
BASOPHILS # BLD: 0 K/UL (ref 0–0.1)
BASOPHILS NFR BLD: 0 % (ref 0–2)
BDY SITE: ABNORMAL
BILIRUB SERPL-MCNC: 0.6 MG/DL (ref 0.2–1)
BNP SERPL-MCNC: 82 PG/ML (ref 0–900)
BUN SERPL-MCNC: 18 MG/DL (ref 7–18)
BUN/CREAT SERPL: 21 (ref 12–20)
CALCIUM SERPL-MCNC: 9.3 MG/DL (ref 8.5–10.1)
CHLORIDE SERPL-SCNC: 105 MMOL/L (ref 100–111)
CO2 SERPL-SCNC: 29 MMOL/L (ref 21–32)
CREAT SERPL-MCNC: 0.85 MG/DL (ref 0.6–1.3)
DIFFERENTIAL METHOD BLD: ABNORMAL
EOSINOPHIL # BLD: 0 K/UL (ref 0–0.4)
EOSINOPHIL NFR BLD: 0 % (ref 0–5)
ERYTHROCYTE [DISTWIDTH] IN BLOOD BY AUTOMATED COUNT: 13.7 % (ref 11.6–14.5)
GAS FLOW.O2 O2 DELIVERY SYS: ABNORMAL L/MIN
GAS FLOW.O2 SETTING OXYMISER: 3 L/M
GLOBULIN SER CALC-MCNC: 3.9 G/DL (ref 2–4)
GLUCOSE SERPL-MCNC: 157 MG/DL (ref 74–99)
HCO3 BLDV-SCNC: 27.6 MMOL/L (ref 23–28)
HCT VFR BLD AUTO: 39 % (ref 35–45)
HGB BLD-MCNC: 13.6 G/DL (ref 12–16)
LACTATE BLD-SCNC: 1.84 MMOL/L (ref 0.4–2)
LYMPHOCYTES # BLD: 2.1 K/UL (ref 0.9–3.6)
LYMPHOCYTES NFR BLD: 24 % (ref 21–52)
MCH RBC QN AUTO: 29.8 PG (ref 24–34)
MCHC RBC AUTO-ENTMCNC: 34.9 G/DL (ref 31–37)
MCV RBC AUTO: 85.3 FL (ref 74–97)
MONOCYTES # BLD: 0.6 K/UL (ref 0.05–1.2)
MONOCYTES NFR BLD: 7 % (ref 3–10)
NEUTS SEG # BLD: 5.9 K/UL (ref 1.8–8)
NEUTS SEG NFR BLD: 69 % (ref 40–73)
PCO2 BLDV: 44.7 MMHG (ref 41–51)
PH BLDV: 7.4 [PH] (ref 7.32–7.42)
PLATELET # BLD AUTO: 288 K/UL (ref 135–420)
PMV BLD AUTO: 8.9 FL (ref 9.2–11.8)
PO2 BLDV: 85 MMHG (ref 25–40)
POTASSIUM SERPL-SCNC: 3.8 MMOL/L (ref 3.5–5.5)
PROT SERPL-MCNC: 7.6 G/DL (ref 6.4–8.2)
RBC # BLD AUTO: 4.57 M/UL (ref 4.2–5.3)
SAO2 % BLDV: 96 % (ref 65–88)
SERVICE CMNT-IMP: ABNORMAL
SODIUM SERPL-SCNC: 139 MMOL/L (ref 136–145)
SPECIMEN TYPE: ABNORMAL
TOTAL RESP. RATE, ITRR: 19
WBC # BLD AUTO: 8.6 K/UL (ref 4.6–13.2)

## 2019-11-06 PROCEDURE — 80053 COMPREHEN METABOLIC PANEL: CPT

## 2019-11-06 PROCEDURE — 74011250636 HC RX REV CODE- 250/636: Performed by: PHYSICIAN ASSISTANT

## 2019-11-06 PROCEDURE — 71045 X-RAY EXAM CHEST 1 VIEW: CPT

## 2019-11-06 PROCEDURE — 74011000250 HC RX REV CODE- 250: Performed by: PHYSICIAN ASSISTANT

## 2019-11-06 PROCEDURE — 83605 ASSAY OF LACTIC ACID: CPT

## 2019-11-06 PROCEDURE — 96374 THER/PROPH/DIAG INJ IV PUSH: CPT

## 2019-11-06 PROCEDURE — 85025 COMPLETE CBC W/AUTO DIFF WBC: CPT

## 2019-11-06 PROCEDURE — 83880 ASSAY OF NATRIURETIC PEPTIDE: CPT

## 2019-11-06 PROCEDURE — 82803 BLOOD GASES ANY COMBINATION: CPT

## 2019-11-06 RX ADMIN — METHYLPREDNISOLONE SODIUM SUCCINATE 125 MG: 125 INJECTION, POWDER, FOR SOLUTION INTRAMUSCULAR; INTRAVENOUS at 00:20

## 2019-11-06 RX ADMIN — IPRATROPIUM BROMIDE AND ALBUTEROL SULFATE 3 ML: .5; 3 SOLUTION RESPIRATORY (INHALATION) at 00:17

## 2019-11-06 NOTE — ED TRIAGE NOTES
Received pt via EMS for SOB. Pt is on home O2 2L via [place]. Pt used home \"combi\" nebs, ambulance gave 2 albuterol and 1 duoneb. Blood glucose was 130mg/dL.

## 2019-11-06 NOTE — DISCHARGE INSTRUCTIONS
Patient Education        COPD Exacerbation Plan: Care Instructions  Your Care Instructions    If you have chronic obstructive pulmonary disease (COPD), your usual shortness of breath could suddenly get worse. You may start coughing more and have more mucus. This flare-up is called a COPD exacerbation (say \"[id number]-rosi\"). A lung infection or air pollution could set off an exacerbation. Sometimes it can happen after a quick change in temperature or being around chemicals. Work with your doctor to make a plan for dealing with an exacerbation. You can better manage it if you plan ahead. Follow-up care is a key part of your treatment and safety. Be sure to make and go to all appointments, and call your doctor if you are having problems. It's also a good idea to know your test results and keep a list of the medicines you take. How can you care for yourself at home? During an exacerbation  · Do not panic if you start to have one. Quick treatment at home may help you prevent serious breathing problems. If you have a COPD exacerbation plan that you developed with your doctor, follow it. · Take your medicines exactly as your doctor tells you.  ? Use your inhaler as directed by your doctor. If your symptoms do not get better after you use your medicine, have someone take you to the emergency room. Call an ambulance if necessary. ? With inhaled medicines, a spacer or a nebulizer may help you get more medicine to your lungs. Ask your doctor or pharmacist how to use them properly. Practice using the spacer in front of a mirror before you have an exacerbation. This may help you get the medicine into your lungs quickly. ? If your doctor has given you steroid pills, take them as directed. ? Your doctor may have given you a prescription for antibiotics, which you can fill if you need it. ? Talk to your doctor if you have any problems with your medicine.  And call your doctor if you have to use your antibiotic or steroid pills. Preventing an exacerbation  · Do not smoke. This is the most important step you can take to prevent more damage to your lungs and prevent problems. If you already smoke, it is never too late to stop. If you need help quitting, talk to your doctor about stop-smoking programs and medicines. These can increase your chances of quitting for good. · Take your daily medicines as prescribed. · Avoid colds and flu. ? Get a pneumococcal vaccine. ? Get a flu vaccine each year, as soon as it is available. Ask those you live or work with to do the same, so they will not get the flu and infect you. ? Try to stay away from people with colds or the flu. ? Wash your hands often. · Avoid secondhand smoke; air pollution; cold, dry air; hot, humid air; and high altitudes. Stay at home with your windows closed when air pollution is bad. · Learn breathing techniques for COPD, such as breathing through pursed lips. These techniques can help you breathe easier during an exacerbation. When should you call for help? Call 911 anytime you think you may need emergency care. For example, call if:    · You have severe trouble breathing.     · You have severe chest pain.    Call your doctor now or seek immediate medical care if:    · You have new or worse shortness of breath.     · You develop new chest pain.     · You are coughing more deeply or more often, especially if you notice more mucus or a change in the color of your mucus.     · You cough up blood.     · You have new or increased swelling in your legs or belly.     · You have a fever.    Watch closely for changes in your health, and be sure to contact your doctor if:    · You need to use your antibiotic or steroid pills.     · Your symptoms are getting worse. Where can you learn more? Go to http://etelvina-olivier.info/. Enter F575 in the search box to learn more about \"COPD Exacerbation Plan: Care Instructions. \"  Current as of: June 9, 2019  Content Version: 12.2  © 1310-7889 [employer]. Care instructions adapted under license by [employer] (which disclaims liability or warranty for this information). If you have questions about a medical condition or this instruction, always ask your healthcare professional. [address] any warranty or liability for your use of this information.

## 2019-11-07 ENCOUNTER — OFFICE VISIT (OUTPATIENT)
Dept: PULMONOLOGY | Age: 59
End: 2019-11-07

## 2019-11-07 VITALS
HEART RATE: 86 BPM | HEIGHT: 63 IN | OXYGEN SATURATION: 97 % | SYSTOLIC BLOOD PRESSURE: 147 MMHG | RESPIRATION RATE: 18 BRPM | WEIGHT: 248.6 LBS | TEMPERATURE: 97 F | BODY MASS INDEX: 44.05 KG/M2 | DIASTOLIC BLOOD PRESSURE: 71 MMHG

## 2019-11-07 DIAGNOSIS — J44.9 COPD WITH ASTHMA (HCC): Primary | ICD-10-CM

## 2019-11-07 DIAGNOSIS — J96.11 CHRONIC RESPIRATORY FAILURE WITH HYPOXIA (HCC): ICD-10-CM

## 2019-11-07 DIAGNOSIS — G47.33 OSA ON CPAP: ICD-10-CM

## 2019-11-07 DIAGNOSIS — I10 ESSENTIAL HYPERTENSION, BENIGN: ICD-10-CM

## 2019-11-07 DIAGNOSIS — E66.01 MORBID OBESITY WITH BMI OF 40.0-44.9, ADULT (HCC): ICD-10-CM

## 2019-11-07 DIAGNOSIS — Z99.89 OSA ON CPAP: ICD-10-CM

## 2019-11-07 DIAGNOSIS — F15.10 CAFFEINE ABUSE (HCC): ICD-10-CM

## 2019-11-07 NOTE — PROGRESS NOTES
[facility]  Pulmonary, Critical Care, and Sleep Medicine    Office Progress Note      Primary Care Physician: Ayaka Champion MD     Reason for Visit:  Evaluation for SHERRIE and CPAP therapy      Assessment:  1. COPD/Asthma on LTOT: 2LPM- Followd by Dr. Mohan Juan. Patient continue with frequent COPD exacerbations requiring hospitalization and NIV. She has been hospitalized 4 times since January 2019. She is completing steroid taper and is steroid dependent. She reports compliance with medications. Did not tolerate Daliresp. She seems to be doing a little bit better on NIV. I am concerned that she has an environmental trigger in her home. Absolute eosinophil count elevated on 2 occassions (200 and 300). IgE in 2018 was 157. Her apartment lease is up in Feb and she and her daughter are planning to move to a new location at that time. 2. Obstructive Sleep Apnea (SHERRIE) with chronic hypoxic respiratory failure- Intolerant to CPAP. Patient doing much better on NIV. Currently on Trilogy: AVAPS-AE Patient reports continued clinical benefit and that she is able to achieve deep and restorative sleep which she was not able to do before    3. 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   5. Poor Sleep Hygiene- multifactorial, some areas improved and others not. Recent prolonged steroid taper with mood swings  6. Diabetes- higher blood sugars with systemic steroids. Should improve as taper down on steroids. 7. Hypertension  8. Morbid Obesity: Body mass index is 44.29 kg/m². Plan:  · Continue with AVAPs-AE with supplemental oxygen at 2LPM  · Will order lightweight tanks for oxygen DME: [employer], MED manages vent.   · COPD Nurse navigator  · Continue 3.5% hypertonic saline nebs to see if this helps with mobilizing sputum  · Restart Advair and Spiriva  · Albuterol Neb PRN  · D/C DuoNeb  · Complete steroid taper  · Continue Singulair  · Patient may be a candidate for biologic such as Scott Fox  · Follow up with Dr. Carole Aguirre for COPD/Asthma  · Patient to call our office if symptoms if condition declines  · Continue to work on cutting back on caffeine intake  · Healthy lifestyle changes to include weight loss and exercise discussed. · Healthy sleep habits were reviewed and encouraged. ·  and workplace safety reviewed and discussed as appropriate. Drowsy and/or inattentive driving should be avoided. · Follow-up in 3 months, sooner should new symptoms or problems arise. History of Present Illness: Mrs. Pipo Brody is a 60 y.o. female patient who presents for follow up  of SHERRIE    The patient is followed in our clinic for COPD/Asthma by Dr. Denver Callahan. She had previously been followed for her SHERRIE and CPAP therapy by Dr. Gabino Banuelos at [facility]. She switch her SHERRIE care to our clinic in May 2018. She was hospitalized in January, April and again in late May of 2019 for COPD exacerbation. After last visit CPAP was discontinued and she was started on NIV with Trilogy ventilator: AVAPs - AE. Today is her first follow up since starting NIV. The following is noted  · The patient was again admitted for an asthma/COPD exacerbation  · She was discharged on a prolonged 6 week steroid taper which has caused mood swings and hyperglycemia. She will step down to 20 mg tomorrow which should help eluviate some of these secondary symptoms. · Her Spiriva and Advair were held and the patient placed on DuoNebs QID  · She continues to take [address]  · She had been doing better until her landlord performed some repairs in her apartment bathroom. She had another exacerbation and called EMS to the home who took her to the ED on 11/5/19 but she was not admitted. · She is not smoking  · No pets in the home  · No chest pain, + wheezing, dyspnea slowly improving  · No notable sputum production. No hemoptysis  · No fever of chills.  But she is still not back to her baseline  · In regards to Trilogy, the patient is very happy with the device. She feels she is able to breath and sleep much better. · She reports that since starting Trilogy she is able to obtain restful sleep. · She is using a FFM- Simplus. No skin irritation or breakdown. · Patient has E- tank with clifton for oxygen which is too heavy  For her. She is on 2LPM and requesting light weight tanks. She has a concentrator in the home. Ventilator Summary Trends:  DME: Vent: MED: OP- RT: Too   Oxygen: [facility]    AVPS- AE: Vt: 450mls P max: 30 cwpPS max: 15cwp PS Min: 6 cwp, EPAP max: 15  EPAP min: 8, breath rate: auto, Trigger: auto trak, Rise Time: 3. Ramp Lenth: 10 minutes    Dates Avg IPAP  (cwp) Avg EPAP  (cwp) Avg RR  (bpm) Avg Peak Flow L/min Avg Hrs/day Use Avg Vte Avg % Pt   triggered breaths  Days used Notes   9/22/19-19/21/19 16.9 8.4 21.9 26.3 6 404.1 80.3 30/30 O2 @ 2LPM                               Occupation:    Not working                       Driving: Yes  Drowsy Driving: is not reported.       Motor vehicle accident(s) associated with drowsy driving:is denied by the patient         3 most recent PHQ Screens 11/7/2019   Little interest or pleasure in doing things Not at all   Feeling down, depressed, irritable, or hopeless Not at all   Total Score PHQ 2 0       [place] Scale 11/7/2019 6/6/2019 5/30/2018   Sitting and Reading 1 1 0   Watching TV 1 3 1   Sitting, inactive in a public place (e.g. a movie theater or meeting) 0 0 0   As a passenger in a car for an hour, without a break 2 1 0   Lying down to rest in the afternoon, when circumstances permit 3 3 1   Sitting and talking to someone 0 0 0   Sitting quietly after lunch without alcohol 1 1 0   In a car, while stopped for a few minutes in traffic 0 0 0   [place] Sleepiness Score 8 9 2         Past Medical History:  Past Medical History:   Diagnosis Date    Asthma     Chronic lung disease     COPD     Cystocele, midline     Diabetes mellitus ([facility] [address]     GERD (gastroesophageal reflux disease)     Hidradenitis suppurativa     Hyperlipidemia     Hypertension     SHERRIE on CPAP     CPAP    Stress incontinence        Past Surgical History:  Past Surgical History:   Procedure Laterality Date    BREAST SURGERY PROCEDURE UNLISTED      Right breast benign tumor removal    HX APPENDECTOMY      HX CHOLECYSTECTOMY      HX DILATION AND CURETTAGE      Dysfunctional uterine bleeding, thought 2/2 fibroids    HX TUBAL LIGATION         Family History:  Family History   Problem Relation Age of Onset    Hypertension Mother     Stroke Mother     Breast Cancer Mother         Bilateral mastectomies    Cancer Mother         ovarian and breast    Heart Failure Mother     Heart Attack Father         2011    Heart Surgery Father         CABG    Heart Failure Father     COPD Sister         Heavy smoker    Cancer Sister         ovarian    Heart Failure Sister     Lung Disease Sister     Asthma Child     Cancer Maternal Aunt         pancreatic    Cancer Maternal Grandfather         stomach       Social History:  Social History     Tobacco Use    Smoking status: Former Smoker     Packs/day: 1.00     Years: 30.00     Pack years: 30.00     Types: Cigarettes     Start date: 1966     Last attempt to quit: 2006     Years since quittin.1    Smokeless tobacco: Never Used    Tobacco comment: 1-1.5 packs per day   Substance Use Topics    Alcohol use: No    Drug use: No        Caffeine Amount Time of last Intake Comments   Coffee 0-1 c/am     Soda 2L/1.5 days  Diet Coke or Ginger Ale   Tea Rare     Energy Drinks None     Over- the - counter stimulant pills None     Other Substances      Alcohol None     Tobacco None     Drugs None         Medications:  Current Outpatient Medications on File Prior to Visit   Medication Sig Dispense Refill    albuterol-ipratropium (DUO-NEB) 2.5 mg-0.5 mg/3 ml nebu 3 mL by Nebulization route every four (4) hours as needed (SOB).  30 Nebule 0    predniSONE (DELTASONE) 10 mg tablet Take 30mg BID for 7 days total, then 50mg daily for 7 days,40 mg daily for 7 days, 30mg daily for 7 days, 20mg daily for 7 days, 10 mg 131 Tab 0    simethicone (GAS-X) 125 mg capsule Take 125 mg by mouth four (4) times daily as needed for Flatulence.  lisinopril (PRINIVIL, ZESTRIL) 20 mg tablet Take 20 mg by mouth two (2) times a day.  albuterol sulfate (PROVENTIL;VENTOLIN) 2.5 mg/0.5 mL nebu nebulizer solution 0.5 mL by Nebulization route four (4) times daily as needed for Wheezing. To be used with HyperSal nebulizer solution. ICD code: COPD J44.1 60 mL 3    fluticasone propionate (FLONASE ALLERGY RELIEF) 50 mcg/actuation nasal spray 2 Sprays by Both Nostrils route daily.  hydroCHLOROthiazide (HYDRODIURIL) 12.5 mg tablet Take 12.5 mg by mouth daily.  insulin glargine (LANTUS,BASAGLAR) 100 unit/mL (3 mL) inpn 35 Units by SubCUTAneous route nightly. (Patient taking differently: 32 Units by SubCUTAneous route nightly. Indications: type 2 diabetes mellitus) 28 Units 0    albuterol sulfate 90 mcg/actuation aepb Take 1 Puff by inhalation every four (4) hours. (Patient taking differently: Take 1 Puff by inhalation every four (4) hours as needed.) 1 Inhaler 0    NOVOLOG FLEXPEN U-100 INSULIN 100 unit/mL inpn Continue home Sliding scale insulin as prior to admission (Patient taking differently: 1 Units by SubCUTAneous route three (3) times daily. If BG <100=0u  101-150=5u  151-250=8u  251-300=12u  >300 call MD  ) 1 Pen 0    omeprazole (PRILOSEC) 40 mg capsule Take 40 mg by mouth daily. Indications: gastroesophageal reflux disease      cpap machine kit by Does Not Apply route nightly. M.E.D.      OXYGEN-AIR DELIVERY SYSTEMS 2 L by Nasal route continuous. [facility]      aspirin delayed-release 81 mg tablet Take 81 mg by mouth daily.  famotidine (PEPCID) 20 mg tablet Take 20 mg by mouth two (2) times daily as needed for Other (reflux).       montelukast (SINGULAIR) 10 mg tablet Take 10 mg by mouth nightly.  insulin glargine (LANTUS) 100 unit/mL injection Take 37u QHS while on steroids, then return to 35u QHS when not taking increased steroids 1 Vial 0    levoFLOXacin (LEVAQUIN) 500 mg tablet Take 1 Tab by mouth every twenty-four (24) hours. 4 Tab 0    loratadine (CLARITIN) 10 mg tablet Take 10 mg by mouth daily as needed for Allergies.  HYPER-SAL 3.5 % nebu USE 1 VIAL IN NEBULIZER TWICE DAILY  6    fluticasone propion-salmeterol (ADVAIR HFA) 230-21 mcg/actuation inhaler Take 2 Puffs by inhalation two (2) times a day.  tiotropium bromide (SPIRIVA RESPIMAT) 2.5 mcg/actuation inhaler Take 2 Puffs by inhalation daily. No current facility-administered medications on file prior to visit. Allergy:  Allergies   Allergen Reactions    Ancef [Cefazolin] Hives    Contrast Agent [Iodine] Anaphylaxis, Shortness of Breath and Swelling     Needs pre-medication for IV contrast with Benadryl, Solu-Medrol    Fish Containing Products Anaphylaxis     Pt states she had a severe allergic reaction at 7 y/o.  Metformin Other (comments)     Abdominal pain, diarrhea.     Codeine Other (comments)     Altered mental status       Review of Systems  General ROS: positive for  - fatigue and sleep disturbance  negative for - chills, fever, hot flashes, malaise, night sweats, weight gain or weight loss  ENT ROS: negative  Hematological and Lymphatic ROS: negative for - bleeding problems, blood clots, bruising, jaundice, pallor or swollen lymph nodes  Endocrine ROS: negative for - polydipsia/polyuria, skin changes, temperature intolerance or unexpected weight changes  Respiratory ROS: positive for - shortness of breath, tachypnea and wheezing  negative for - cough, hemoptysis, orthopnea, pleuritic pain, sputum changes or stridor  Cardiovascular ROS: positive fordyspnea on exertion  negative for - loss of consciousness, murmur or orthopnea  Gastrointestinal ROS: no abdominal pain, change in bowel habits, or black or bloody stools  Genito-Urinary ROS: no dysuria, trouble voiding, or hematuria  Musculoskeletal ROS: positive for - joint stiffness  Neurological ROS: no TIA or stroke symptoms  Dermatological ROS: negative for - pruritus, rash or skin lesion changes   Psychological ROS: negative   Otherwise negative. Physical Exam:  Blood pressure 147/71, pulse 86, temperature 97 °F (36.1 °C), temperature source Oral, resp. rate 18, height 5' 3\" (1.6 m), weight 112.8 kg (248 lb 9.6 oz), SpO2 97 %. on 2LPM, Body mass index is 44.04 kg/m². General:No distress, acyanotic, appears stated age, cooperative, pleasant  HEENT: PERRL, EOMI, throat without erythema or exudate, Tongue- normal size with some dental indention on tongue, Mallampati's score 3+, Uvula- midline. Neck: Supple,  no abnormally enlarged lymph nodes, thyroid is not enlarged, non-tender, No JVD  Chest: Increased A-P diameter  Lungs: moderate air entry, mild - bilateral, end-expiratory wheezing, no stridor, no accessory muscle use. Heart: Regular rate and rhythm, S1S2 present, without murmur  Abdomen: Obese,  bowel sounds normoactive, abdomen is soft without significant tenderness  Extremity: negative for edema, cyanosis or clubbing  Skin: Skin color, texture, turgor normal. No rashes or lesions    Data Reviewed:    7/12/2018 11:36 PM - David, Lab In Text A Cab     Component Value Flag Ref Range Units Status   Immunoglobulin E 157  High   0 - 100 IU/mL Final   Comment:     Results for Cristine Lopez (MRN [id number]) as of 11/7/2019 20:53   Ref. Range 9/1/2019 00:20 9/2/2019 01:06 9/3/2019 06:18 9/4/2019 06:10 9/5/2019 02:30 9/20/2019 18:40 10/14/2019 12:00 11/6/2019 00:02   ABS.  EOSINOPHILS Latest Ref Range: 0.0 - 0.4 K/UL 0.0 0.0 0.0 0.0 0.0 0.2 0.3 0.0       CBC:   Lab Results   Component Value Date/Time    WBC 8.6 11/06/2019 12:02 AM    HGB 13.6 11/06/2019 12:02 AM    HCT 39.0 11/06/2019 12:02 AM    PLATELET 189 65/83/5606 12:02 AM    MCV 85.3 11/06/2019 12:02 AM       BMP:   Lab Results   Component Value Date/Time    Sodium 139 11/06/2019 12:02 AM    Potassium 3.8 11/06/2019 12:02 AM    Chloride 105 11/06/2019 12:02 AM    CO2 29 11/06/2019 12:02 AM    Anion gap 5 11/06/2019 12:02 AM    Glucose 157 (H) 11/06/2019 12:02 AM    BUN 18 11/06/2019 12:02 AM    Creatinine 0.85 11/06/2019 12:02 AM    BUN/Creatinine ratio 21 (H) 11/06/2019 12:02 AM    GFR est AA >60 11/06/2019 12:02 AM    GFR est non-AA >60 11/06/2019 12:02 AM    Calcium 9.3 11/06/2019 12:02 AM        TSH:  Lab Results   Component Value Date/Time    TSH 2.76 09/18/2016 02:20 PM    TSH 2.94 02/15/2010 11:24 AM     Results for [facility] (MRN [id number]) as of 6/6/2019 10:56   Ref. Range 5/28/2019 16:49   pH (POC) Latest Ref Range: 7.35 - 7.45   7.404   pCO2 (POC) Latest Ref Range: 35.0 - 45.0 MMHG 38.8   pO2 (POC) Latest Ref Range: 80 - 100 MMHG 84   HCO3 (POC) Latest Ref Range: 22 - 26 MMOL/L 24.3   sO2 (POC) Latest Ref Range: 92 - 97 % 96   Base excess (POC) Latest Units: mmol/L 0   FIO2 (POC) Latest Units: % 21   Specimen type (POC) Latest Units:   ARTERIAL   Site Latest Units:   LEFT RADIAL   Device: Latest Units:   ROOM AIR   Total resp. rate Latest Units:   19   Allens test (POC) Latest Units:   N/A     Imaging:  [x]I have personally reviewed the patients radiographs section   Results from Hospital Encounter encounter on 11/05/19   XR CHEST PORT    Narrative EXAM: XR CHEST PORT    INDICATION: dyspnea    COMPARISON: 10/14/2019    FINDINGS: A portable AP radiograph of the chest was obtained at 0034 hours. The  bones are slightly osteopenic. The heart is prominent. The lungs reveal mild  vascular congestion. There is no mass or consolidation. Minor retrocardiac  atelectasis noted.       Impression IMPRESSION: No significant interval change, no active disease       Results from [facility] Encounter encounter on 07/14/19   CT ABD PELV WO CONT    Narrative HISTORY: Right-sided abdominal pain since this morning. Sonal Hind EXAM: CT abdomen and pelvis. TECHNIQUE: Spiral images of the abdomen and pelvis were performed according to  routine protocol without intravenous contrast. Coronal and sagittal  reconstructions were provided for evaluation. Oral contrast  was not given. COMPARISON: None. All CT scans at this facility are performed using dose optimization technique as  appropriate to a performed exam, to include automated exposure control,  adjustment of the mA and/or kV according to patient size (including appropriate  matching for site-specific examinations), or use of iterative reconstruction  technique. ABDOMEN FINDINGS: Limited evaluation of abdomen and pelvis given lack of oral  and intravenous contrast.    Emphysematous changes noted bilaterally. Liver: Hepatic steatosis. Limited evaluation. Gallbladder: Has been removed. No biliary ductal dilatation. Pancreas: Normal attenuation without mass or ductal dilatation. Spleen: [Unremarkable.]    Adrenal Glands: Unremarkable. Kidneys:     Cortical renal thinning bilaterally. No hydronephrosis or hydroureter. No  nephrolithiasis. Lymph Nodes: No large adenopathy. Aorta:   Grossly unremarkable    PELVIS FINDINGS:     Bowel: Small Bowel: Normal in caliber with normal wall thickness. Large Bowel: Normal in caliber with normal wall thickness. Limited evaluation of  large bowel given presence of fecal material and lack of oral contrast.    Appendix: Not seen. Bladder: Limited evaluation given its under distention. No ascites or pneumoperitoneum. Umbilical hernia is demonstrated with loop of small bowel as well as omental fat  present within the defect. No evidence of obstruction. Hernia defect measures  approximately series 2 image 61 2.5 cm. Marked body habitus. Bones: No lytic or blastic lesions. Impression IMPRESSION:    1. Umbilical hernia with single loop of small bowel as well as omental fat  present within the defect without evidence of obstruction is measured above. 2.   Medical renal disease. 3. Cholecystectomy. 4. Hepatic steatosis. Follow-up with liver ultrasound and LFTs is recommended. .        Cardiac Echo:     Results for orders placed or performed during the hospital encounter of 13   2D ECHO COMPLETE ADULT (TTE)     Status: None    [facility]  [facility], [phone]    Transthoracic Echocardiogram    Patient: Obdulio Nolan  MRN: [id number]  ACCT #: [de-identified]  : 1960  Age: 47 years  Gender: Female  Height: 63 in  Weight: 246.4 lb  BSA: 2.11 m squared  Study date: 19-Aug-2013  BP: 174 / 97 mmHg  Status: Routine  Location: Echo lab  DMS ACC #: [id number]    Allergies: CODEINE, CEFAZOLIN, IODINE    Technologist:  ALEKS Pereira, [facility]  Referring:  Austin Angel. Raphael Webster MD  Referring:  Marques Medina MD  Interpreting Group:  [address] [place] [address]  Interpreting Physician:  Angela Rossi MD    IMPRESSIONS:  There was no significant valvular heart disease. SUMMARY:  Procedure information: This was a technically difficult study. Left ventricle: Size was normal. Systolic function was normal by EF  (biplane method of disks). Ejection fraction was estimated to be 60 %. No  obvious wall motion abnormalities identified in the views obtained. There  was mild concentric hypertrophy. INDICATIONS: Edema, Shortness of breath. HISTORY: Prior history: Risk factors: hypertension, oral  hypoglycemic-treated diabetes, medication-treated hypercholesterolemia,  morbid obesity, and a history of cigarette use (quitting 7 years ago). Chronic lung disease. PROCEDURE: This was a routine study. The study included complete 2D  imaging, M-mode, complete spectral Doppler, and color Doppler.  Systolic  blood pressure was 174 mmHg, at the start of the study. Diastolic blood  pressure was 97 mmHg, at the start of the study. This was a technically  difficult study. LEFT VENTRICLE: Size was normal. Systolic function was normal by EF  (biplane method of disks). Ejection fraction was estimated to be 60 %. No  obvious wall motion abnormalities identified in the views obtained. There  was mild concentric hypertrophy. DOPPLER: Indeterminate diastolic function. RIGHT VENTRICLE: The size was normal. Systolic function was normal.  DOPPLER: The tricuspid jet envelope definition was inadequate for  estimation of RV systolic pressure. There are no indirect findings  (abnormal RV volume or geometry, altered pulmonary flow velocity profile,  or leftward septal displacement) which would suggest moderate or severe  pulmonary hypertension. LEFT ATRIUM: Size was normal. LA volume index was 19 ml/m squared. RIGHT ATRIUM: Size was normal.    MITRAL VALVE: Normal valve structure. There was normal leaflet separation. DOPPLER: The transmitral velocity was within the normal range. There was  no evidence for stenosis. There was trivial regurgitation. AORTIC VALVE: The valve was probably trileaflet. Leaflets exhibited normal  cuspal separation and good mobility. DOPPLER: There was no stenosis. There  was no regurgitation. TRICUSPID VALVE: Normal valve structure. There was normal leaflet  separation. DOPPLER: The transtricuspid velocity was within the normal  range. There was no evidence for tricuspid stenosis. There was trivial  regurgitation. PULMONIC VALVE: Not well visualized, but normal Doppler findings. DOPPLER:  There was no stenosis. AORTA: The root exhibited normal size. SYSTEMIC VEINS: IVC: The inferior vena cava was not well visualized. PERICARDIUM: There was no pericardial effusion. A pericardial fat pad was  present.     MEASUREMENT TABLES    Doppler measurements  Tricuspid valve   (Reference normals)  Regurg peak isacc   202 cm/s (--)  RV-RA peak gradient   16 mmHg   (--)    SYSTEM MEASUREMENT TABLES    2D  Ao Diam: 2.24 cm  LA Diam: 3.23 cm  %FS: 34.79 %  EF(Teich): 64.51 %  IVSd: 1.44 cm  LVIDd: 4.05 cm  LVIDs: 2.64 cm  LVPWd: 1.29 cm  SV(Teich): 46.61 ml  EF Biplane: 67.59 %  LVEDV MOD BP: 60.02 ml  LVESV MOD BP: 19.45 ml  LVOT Diam: 1.93 cm  RVIDd: 3.02 cm    CW  AV Vmax: 1.39 m/s  AV maxP.76 mmHG  TR Vmax: 2.02 m/s  AV Vmean: 1.08 m/s    PW  LVOT VTI: 22.77 cm  LVOT Vmax: 1.33 m/s  LVOT Vmean: 0.72 m/s  MV A Moe: 0.01 m/s  MV E Moe: 0.55 m/s  MV E/A Ratio: 48.33  TED (VTI): 2.57 cm2    Prepared and E-signed by    Lan Rossi MD  Signed 19-Aug-2013 17:23:16            Historical Sleep Testing Data:  - PSG: EVMS:  16:  AHI:12, SpO2 divine 69%- Mean 90%, time below 89%: 30min        Neha Chu DO, [facility]  Pulmonary, Sleep and Critical Care Medicine

## 2019-11-07 NOTE — PROGRESS NOTES
Peder Lundborg presents today for   Chief Complaint   Patient presents with    Sleep Apnea       Is someone accompanying this pt? No    Is the patient using any DME equipment during OV? CPAP     -[employer]     Depression Screening:  3 most recent PHQ Screens 7/16/2019   Little interest or pleasure in doing things Not at all   Feeling down, depressed, irritable, or hopeless Not at all   Total Score PHQ 2 0       Learning Assessment:  Learning Assessment 4/9/2018   PRIMARY LEARNER Patient   HIGHEST LEVEL OF EDUCATION - PRIMARY LEARNER  SOME COLLEGE   BARRIERS PRIMARY LEARNER -   PRIMARY LANGUAGE ENGLISH   LEARNER PREFERENCE PRIMARY READING   ANSWERED BY patient Yann Rodriguez   RELATIONSHIP SELF       Abuse Screening:  No flowsheet data found. Fall Risk  Fall Risk Assessment, last 12 mths 2/6/2018   Able to walk? Yes   Fall in past 12 months? No         Coordination of Care:  1. Have you been to the ER, urgent care clinic since your last visit? Hospitalized since your last visit? Yes; Name: James [facility] COPD 10/17/2019    2. Have you seen or consulted any other health care providers outside of the [address] [place] [address] since your last visit? Include any pap smears or colon screening.  No

## 2019-11-21 ENCOUNTER — TELEPHONE (OUTPATIENT)
Dept: PULMONOLOGY | Age: 59
End: 2019-11-21

## 2019-11-21 NOTE — TELEPHONE ENCOUNTER
[facility] PCP office called. They received request for IGE level report.  They have checked their records and do not have any IGE level's to report

## 2020-01-07 ENCOUNTER — OFFICE VISIT (OUTPATIENT)
Dept: PULMONOLOGY | Age: 60
End: 2020-01-07

## 2020-01-07 VITALS
BODY MASS INDEX: 42.95 KG/M2 | TEMPERATURE: 97.5 F | DIASTOLIC BLOOD PRESSURE: 90 MMHG | SYSTOLIC BLOOD PRESSURE: 160 MMHG | RESPIRATION RATE: 24 BRPM | WEIGHT: 242.4 LBS | OXYGEN SATURATION: 96 % | HEIGHT: 63 IN | HEART RATE: 64 BPM

## 2020-01-07 DIAGNOSIS — J96.11 CHRONIC RESPIRATORY FAILURE WITH HYPOXIA (HCC): Primary | ICD-10-CM

## 2020-01-07 DIAGNOSIS — J44.9 CHRONIC OBSTRUCTIVE PULMONARY DISEASE, UNSPECIFIED COPD TYPE (HCC): ICD-10-CM

## 2020-01-07 RX ORDER — PREDNISONE 10 MG/1
10 TABLET ORAL EVERY OTHER DAY
COMMUNITY
End: 2020-01-11

## 2020-01-07 NOTE — PROGRESS NOTES
The pt. Received both the Flu Vaccine and Prevnar 13 on 12/31/19. Several days later she developed a serious headache and gen malaise. She also developed a cough and wheezing. She has needed her nebulizer several times a day since. She had surg. For glaucoma a couple of days later. She states her household has had viral symptoms in this period also. Fred Long presents today for   Chief Complaint   Patient presents with    Breathing Problem     F/U to 8/12 ED 11/6 SOB, CXRs 9/20, 10/14, 116       Is someone accompanying this pt? No    Is the patient using any DME equipment during OV? Trilogy, nebulizer, O2    [employer]. Depression Screening:  3 most recent PHQ Screens 11/7/2019   Little interest or pleasure in doing things Not at all   Feeling down, depressed, irritable, or hopeless Not at all   Total Score PHQ 2 0       Learning Assessment:  Learning Assessment 4/9/2018   PRIMARY LEARNER Patient   HIGHEST LEVEL OF EDUCATION - PRIMARY LEARNER  SOME COLLEGE   BARRIERS PRIMARY LEARNER -   PRIMARY LANGUAGE ENGLISH   LEARNER PREFERENCE PRIMARY READING   ANSWERED BY patient Zeinab Juarez   RELATIONSHIP SELF       Abuse Screening:  The pt. Denies any. Fall Risk  Fall Risk Assessment, last 12 mths 2/6/2018   Able to walk? Yes   Fall in past 12 months? No         Coordination of Care:  1. Have you been to the ER, urgent care clinic since your last visit? Hospitalized since your last visit? ED in Nov.    2. Have you seen or consulted any other health care providers outside of the [address] [place] [address] since your last visit? [facility]    Medication variance in dosage/sig per patient as follows: Per med. rec. Candance Huger

## 2020-01-07 NOTE — PROGRESS NOTES
[facility]  Pulmonary, Critical Care, and Sleep Medicine      Chief complaint:  COPD chronic respiratory failure    HPI:    Galindo Matthews    is 60years old and returns the office today for follow-up and relates that she has a dry cough and shortness of breath with exertion which is stable. She denies leg swelling or chest pain and says she continues to take Spiriva Advair albuterol and use her supplemental oxygen and her noninvasive ventilator at night. And for now is taking prednisone 10 mg every other a.m. Allergies   Allergen Reactions    Ancef [Cefazolin] Hives    Contrast Agent [Iodine] Anaphylaxis, Shortness of Breath and Swelling     Needs pre-medication for IV contrast with Benadryl, Solu-Medrol    Fish Containing Products Anaphylaxis     Pt states she had a severe allergic reaction at 7 y/o.  Statins-Hmg-Coa Reductase Inhibitors Myalgia    Metformin Other (comments)     Abdominal pain, diarrhea.  Codeine Other (comments)     Altered mental status     Current Outpatient Medications   Medication Sig    predniSONE (DELTASONE) 10 mg tablet Take 10 mg by mouth every other day.  insulin glargine (LANTUS) 100 unit/mL injection Take 37u QHS while on steroids, then return to 35u QHS when not taking increased steroids    simethicone (GAS-X) 125 mg capsule Take 125 mg by mouth four (4) times daily as needed for Flatulence.  loratadine (CLARITIN) 10 mg tablet Take 10 mg by mouth daily as needed for Allergies.  lisinopril (PRINIVIL, ZESTRIL) 20 mg tablet Take 20 mg by mouth two (2) times a day.  albuterol sulfate (PROVENTIL;VENTOLIN) 2.5 mg/0.5 mL nebu nebulizer solution 0.5 mL by Nebulization route four (4) times daily as needed for Wheezing. To be used with HyperSal nebulizer solution. ICD code: COPD J44.1    fluticasone propionate (FLONASE ALLERGY RELIEF) 50 mcg/actuation nasal spray 2 Sprays by Both Nostrils route daily.     fluticasone propion-salmeterol (ADVAIR HFA) 230-21 mcg/actuation inhaler Take 2 Puffs by inhalation two (2) times a day.  hydroCHLOROthiazide (HYDRODIURIL) 12.5 mg tablet Take 12.5 mg by mouth daily.  tiotropium bromide (SPIRIVA RESPIMAT) 2.5 mcg/actuation inhaler Take 2 Puffs by inhalation daily.  insulin glargine (LANTUS,BASAGLAR) 100 unit/mL (3 mL) inpn 35 Units by SubCUTAneous route nightly. (Patient taking differently: 32 Units by SubCUTAneous route nightly. Indications: type 2 diabetes mellitus)    albuterol sulfate 90 mcg/actuation aepb Take 1 Puff by inhalation every four (4) hours. (Patient taking differently: Take 1 Puff by inhalation every four (4) hours as needed.)    NOVOLOG FLEXPEN U-100 INSULIN 100 unit/mL inpn Continue home Sliding scale insulin as prior to admission (Patient taking differently: 1 Units by SubCUTAneous route three (3) times daily. If BG <100=0u  101-150=5u  151-250=8u  251-300=12u  >300 call MD  )    omeprazole (PRILOSEC) 40 mg capsule Take 40 mg by mouth daily. Indications: gastroesophageal reflux disease    cpap machine kit by Does Not Apply route nightly. [place] [facility] Trigreg[facility]/Dr. Camilo Alves    OXYGEN-AIR DELIVERY SYSTEMS 2 L by Nasal route continuous. [facility]    aspirin delayed-release 81 mg tablet Take 81 mg by mouth daily.  montelukast (SINGULAIR) 10 mg tablet Take 10 mg by mouth nightly.  albuterol-ipratropium (DUO-NEB) 2.5 mg-0.5 mg/3 ml nebu 3 mL by Nebulization route every four (4) hours as needed (SOB).  levoFLOXacin (LEVAQUIN) 500 mg tablet Take 1 Tab by mouth every twenty-four (24) hours.  predniSONE (DELTASONE) 10 mg tablet Take 30mg BID for 7 days total, then 50mg daily for 7 days,40 mg daily for 7 days, 30mg daily for 7 days, 20mg daily for 7 days, 10 mg    HYPER-SAL 3.5 % nebu USE 1 VIAL IN NEBULIZER TWICE DAILY    famotidine (PEPCID) 20 mg tablet Take 20 mg by mouth two (2) times daily as needed for Other (reflux).      No current facility-administered medications for this visit.       Past Medical History:   Diagnosis Date    Asthma     Chronic lung disease     COPD     Cystocele, midline     Diabetes mellitus (HCC)     GERD (gastroesophageal reflux disease)     Hidradenitis suppurativa     Hyperlipidemia     Hypertension     SHERRIE on CPAP     CPAP    Stress incontinence      Past Surgical History:   Procedure Laterality Date    BREAST SURGERY PROCEDURE UNLISTED      Right breast benign tumor removal    HX APPENDECTOMY      HX CHOLECYSTECTOMY      HX DILATION AND CURETTAGE      Dysfunctional uterine bleeding, thought 2/2 fibroids    HX TUBAL LIGATION       Social History     Socioeconomic History    Marital status: LEGALLY      Spouse name: Not on file    Number of children: Not on file    Years of education: Not on file    Highest education level: Not on file   Occupational History    Not on file   Social Needs    Financial resource strain: Not on file    Food insecurity:     Worry: Not on file     Inability: Not on file    Transportation needs:     Medical: Not on file     Non-medical: Not on file   Tobacco Use    Smoking status: Former Smoker     Packs/day: 1.00     Years: 30.00     Pack years: 30.00     Types: Cigarettes     Start date: 1966     Last attempt to quit: 2006     Years since quittin.3    Smokeless tobacco: Never Used    Tobacco comment: 1-1.5 packs per day   Substance and Sexual Activity    Alcohol use: No    Drug use: No    Sexual activity: Never   Lifestyle    Physical activity:     Days per week: Not on file     Minutes per session: Not on file    Stress: Not on file   Relationships    Social connections:     Talks on phone: Not on file     Gets together: Not on file     Attends Mandaeism service: Not on file     Active member of club or organization: Not on file     Attends meetings of clubs or organizations: Not on file     Relationship status: Not on file    Intimate partner violence:     Fear of current or ex partner: Not on file     Emotionally abused: Not on file     Physically abused: Not on file     Forced sexual activity: Not on file   Other Topics Concern    Not on file   Social History Narrative    Not on file     Family History   Problem Relation Age of Onset    Hypertension Mother     Stroke Mother     Breast Cancer Mother         Bilateral mastectomies    Cancer Mother         ovarian and breast    Heart Failure Mother     Heart Attack Father         2011    Heart Surgery Father         CABG    Heart Failure Father     COPD Sister         Heavy smoker    Cancer Sister         ovarian    Heart Failure Sister     Lung Disease Sister     Asthma Child     Cancer Maternal Aunt         pancreatic    Cancer Maternal Grandfather         stomach       Review of systems:  She denies fever chills poor appetite or weight loss    Physical Exam:  Visit Vitals  /90 (BP 1 Location: Left arm, BP Patient Position: Sitting)   Pulse 64   Temp 97.5 °F (36.4 °C)   Resp 24   Ht 5' 3\" (1.6 m)   Wt 110 kg (242 lb 6.4 oz)   SpO2 96% Comment: POC @ 2 liters   BMI 42.94 kg/m²       Well-developed obese  HEENT: WNL  Lymph node exam: Supraclavicular cervical lymph nodes negative  Chest: Equal symmetrical expansion no dullness no wheezes rales rubs  Heart: Regular rhythm no gallop no murmur no JVD no peripheral edema  Extremities: No cyanosis clubbing or calf tenderness  Neurological: Alert and oriented    Labs:    O2 sat 2 L of oxygen at rest 96%    Impression:     By history and physical exam COPD chronic respiratory failure stable    Plan:     Continue every other day prednisone for several more months and then make a decision about continuing it  Continue Spiriva Advair PRN albuterol supplemental oxygen and noninvasive ventilator  Follow-up in 3 months or sooner if needed    Sebastian Cobian MD , [facility]    CC: Chet Pedro MD     [address] [facility] [place] [address]     P: [phone]     F: [phone]

## 2020-01-07 NOTE — PATIENT INSTRUCTIONS
Continue Spiriva 2 inhalations once daily    Continue Advair 2 inhalations twice daily and remember to wash mouth with water and spit it out after inhaling    Continue prednisone 10 mg tablet every other morning with breakfast    Continue albuterol nebulizer every 4 hours as needed or by hand-held inhaler 2 puffs every 4 hours as needed and if you require albuterol too often to control respiratory symptoms call the office for severe symptoms go to the emergency room    Continue noninvasive ventilator with sleep and supplemental oxygen 24 hours a day

## 2020-01-11 ENCOUNTER — HOSPITAL ENCOUNTER (EMERGENCY)
Age: 60
Discharge: HOME | End: 2020-01-11
Attending: EMERGENCY MEDICINE
Payer: MEDICARE

## 2020-01-11 ENCOUNTER — APPOINTMENT (OUTPATIENT)
Dept: GENERAL RADIOLOGY | Age: 60
End: 2020-01-11
Attending: EMERGENCY MEDICINE
Payer: MEDICARE

## 2020-01-11 VITALS
OXYGEN SATURATION: 96 % | WEIGHT: 247 LBS | RESPIRATION RATE: 25 BRPM | TEMPERATURE: 98.1 F | SYSTOLIC BLOOD PRESSURE: 127 MMHG | HEART RATE: 89 BPM | BODY MASS INDEX: 43.77 KG/M2 | HEIGHT: 63 IN | DIASTOLIC BLOOD PRESSURE: 87 MMHG

## 2020-01-11 DIAGNOSIS — J20.9 ACUTE BRONCHITIS, UNSPECIFIED ORGANISM: Primary | ICD-10-CM

## 2020-01-11 LAB
ALBUMIN SERPL-MCNC: 3.5 G/DL (ref 3.4–5)
ALBUMIN/GLOB SERPL: 0.8 {RATIO} (ref 0.8–1.7)
ALP SERPL-CCNC: 94 U/L (ref 45–117)
ALT SERPL-CCNC: 41 U/L (ref 13–56)
ANION GAP SERPL CALC-SCNC: 6 MMOL/L (ref 3–18)
APPEARANCE UR: CLEAR
AST SERPL-CCNC: 27 U/L (ref 10–38)
BASOPHILS # BLD: 0 K/UL (ref 0–0.1)
BASOPHILS NFR BLD: 0 % (ref 0–2)
BILIRUB SERPL-MCNC: 0.5 MG/DL (ref 0.2–1)
BILIRUB UR QL: NEGATIVE
BUN SERPL-MCNC: 13 MG/DL (ref 7–18)
BUN/CREAT SERPL: 14 (ref 12–20)
CALCIUM SERPL-MCNC: 9.4 MG/DL (ref 8.5–10.1)
CHLORIDE SERPL-SCNC: 103 MMOL/L (ref 100–111)
CO2 SERPL-SCNC: 30 MMOL/L (ref 21–32)
COLOR UR: YELLOW
CREAT SERPL-MCNC: 0.93 MG/DL (ref 0.6–1.3)
DIFFERENTIAL METHOD BLD: NORMAL
EOSINOPHIL # BLD: 0.2 K/UL (ref 0–0.4)
EOSINOPHIL NFR BLD: 2 % (ref 0–5)
ERYTHROCYTE [DISTWIDTH] IN BLOOD BY AUTOMATED COUNT: 13.4 % (ref 11.6–14.5)
GLOBULIN SER CALC-MCNC: 4.2 G/DL (ref 2–4)
GLUCOSE SERPL-MCNC: 304 MG/DL (ref 74–99)
GLUCOSE UR STRIP.AUTO-MCNC: >1000 MG/DL
HCT VFR BLD AUTO: 39.4 % (ref 35–45)
HGB BLD-MCNC: 13.6 G/DL (ref 12–16)
HGB UR QL STRIP: NEGATIVE
KETONES UR QL STRIP.AUTO: NEGATIVE MG/DL
LACTATE BLD-SCNC: 3.39 MMOL/L (ref 0.4–2)
LEUKOCYTE ESTERASE UR QL STRIP.AUTO: NEGATIVE
LYMPHOCYTES # BLD: 2.7 K/UL (ref 0.9–3.6)
LYMPHOCYTES NFR BLD: 36 % (ref 21–52)
MCH RBC QN AUTO: 30 PG (ref 24–34)
MCHC RBC AUTO-ENTMCNC: 34.5 G/DL (ref 31–37)
MCV RBC AUTO: 87 FL (ref 74–97)
MONOCYTES # BLD: 0.5 K/UL (ref 0.05–1.2)
MONOCYTES NFR BLD: 6 % (ref 3–10)
NEUTS SEG # BLD: 4.2 K/UL (ref 1.8–8)
NEUTS SEG NFR BLD: 56 % (ref 40–73)
NITRITE UR QL STRIP.AUTO: NEGATIVE
PH UR STRIP: 6.5 [PH] (ref 5–8)
PLATELET # BLD AUTO: 299 K/UL (ref 135–420)
PMV BLD AUTO: 9.2 FL (ref 9.2–11.8)
POTASSIUM SERPL-SCNC: 4.2 MMOL/L (ref 3.5–5.5)
PROT SERPL-MCNC: 7.7 G/DL (ref 6.4–8.2)
PROT UR STRIP-MCNC: NEGATIVE MG/DL
RBC # BLD AUTO: 4.53 M/UL (ref 4.2–5.3)
SODIUM SERPL-SCNC: 139 MMOL/L (ref 136–145)
SP GR UR REFRACTOMETRY: 1.02 (ref 1–1.03)
UROBILINOGEN UR QL STRIP.AUTO: 1 EU/DL (ref 0.2–1)
WBC # BLD AUTO: 7.5 K/UL (ref 4.6–13.2)

## 2020-01-11 PROCEDURE — 74011250636 HC RX REV CODE- 250/636: Performed by: EMERGENCY MEDICINE

## 2020-01-11 PROCEDURE — 99285 EMERGENCY DEPT VISIT HI MDM: CPT

## 2020-01-11 PROCEDURE — 96374 THER/PROPH/DIAG INJ IV PUSH: CPT

## 2020-01-11 PROCEDURE — 87040 BLOOD CULTURE FOR BACTERIA: CPT

## 2020-01-11 PROCEDURE — 87186 SC STD MICRODIL/AGAR DIL: CPT

## 2020-01-11 PROCEDURE — 71045 X-RAY EXAM CHEST 1 VIEW: CPT

## 2020-01-11 PROCEDURE — 87077 CULTURE AEROBIC IDENTIFY: CPT

## 2020-01-11 PROCEDURE — 81003 URINALYSIS AUTO W/O SCOPE: CPT

## 2020-01-11 PROCEDURE — 83605 ASSAY OF LACTIC ACID: CPT

## 2020-01-11 PROCEDURE — 93005 ELECTROCARDIOGRAM TRACING: CPT

## 2020-01-11 PROCEDURE — 85025 COMPLETE CBC W/AUTO DIFF WBC: CPT

## 2020-01-11 PROCEDURE — 94640 AIRWAY INHALATION TREATMENT: CPT

## 2020-01-11 PROCEDURE — 80053 COMPREHEN METABOLIC PANEL: CPT

## 2020-01-11 PROCEDURE — 74011000250 HC RX REV CODE- 250: Performed by: EMERGENCY MEDICINE

## 2020-01-11 PROCEDURE — 96375 TX/PRO/DX INJ NEW DRUG ADDON: CPT

## 2020-01-11 RX ORDER — IPRATROPIUM BROMIDE AND ALBUTEROL SULFATE 2.5; .5 MG/3ML; MG/3ML
3 SOLUTION RESPIRATORY (INHALATION)
Status: COMPLETED | OUTPATIENT
Start: 2020-01-11 | End: 2020-01-11

## 2020-01-11 RX ORDER — CEFTRIAXONE 2 G/1
INJECTION, POWDER, FOR SOLUTION INTRAMUSCULAR; INTRAVENOUS
Status: DISPENSED
Start: 2020-01-11 | End: 2020-01-12

## 2020-01-11 RX ORDER — PREDNISONE 50 MG/1
50 TABLET ORAL DAILY
Qty: 5 TAB | Refills: 0 | Status: SHIPPED | OUTPATIENT
Start: 2020-01-11 | End: 2020-01-16

## 2020-01-11 RX ORDER — SODIUM CHLORIDE 0.9 % (FLUSH) 0.9 %
5-10 SYRINGE (ML) INJECTION AS NEEDED
Status: DISCONTINUED | OUTPATIENT
Start: 2020-01-11 | End: 2020-01-12 | Stop reason: HOSPADM

## 2020-01-11 RX ORDER — AZITHROMYCIN 250 MG/1
TABLET, FILM COATED ORAL
Qty: 6 TAB | Refills: 0 | Status: SHIPPED | OUTPATIENT
Start: 2020-01-11 | End: 2020-01-16

## 2020-01-11 RX ADMIN — IPRATROPIUM BROMIDE AND ALBUTEROL SULFATE 3 ML: .5; 3 SOLUTION RESPIRATORY (INHALATION) at 20:17

## 2020-01-11 RX ADMIN — SODIUM CHLORIDE 1000 ML: 900 INJECTION, SOLUTION INTRAVENOUS at 20:04

## 2020-01-11 RX ADMIN — AZITHROMYCIN MONOHYDRATE 500 MG: 500 INJECTION, POWDER, LYOPHILIZED, FOR SOLUTION INTRAVENOUS at 20:03

## 2020-01-11 RX ADMIN — WATER 2 G: 1 INJECTION INTRAMUSCULAR; INTRAVENOUS; SUBCUTANEOUS at 20:04

## 2020-01-11 RX ADMIN — IPRATROPIUM BROMIDE AND ALBUTEROL SULFATE 3 ML: .5; 3 SOLUTION RESPIRATORY (INHALATION) at 20:03

## 2020-01-11 NOTE — ED TRIAGE NOTES
Pt arrived via EMS with c/o SOB for a couple of days. Pt has hx of COPD and Asthma. Pt is on 2L O2 at home where sats were 92%. Pt received Nebulizer x 1, duo neb x 1, Solumedrol 125 mg and Mag 2g.

## 2020-01-12 LAB
ATRIAL RATE: 89 BPM
CALCULATED P AXIS, ECG09: 50 DEGREES
CALCULATED R AXIS, ECG10: 23 DEGREES
CALCULATED T AXIS, ECG11: 49 DEGREES
DIAGNOSIS, 93000: NORMAL
P-R INTERVAL, ECG05: 158 MS
Q-T INTERVAL, ECG07: 388 MS
QRS DURATION, ECG06: 88 MS
QTC CALCULATION (BEZET), ECG08: 472 MS
VENTRICULAR RATE, ECG03: 89 BPM

## 2020-01-12 NOTE — DISCHARGE INSTRUCTIONS
Patient Education        Bronchitis: Care Instructions  Your Care Instructions    Bronchitis is inflammation of the bronchial tubes, which carry air to the lungs. The tubes swell and produce mucus, or phlegm. The mucus and inflamed bronchial tubes make you cough. You may have trouble breathing. Most cases of bronchitis are caused by viruses like those that cause colds. Antibiotics usually do not help and they may be harmful. Bronchitis usually develops rapidly and lasts about 2 to 3 weeks in otherwise healthy people. Follow-up care is a key part of your treatment and safety. Be sure to make and go to all appointments, and call your doctor if you are having problems. It's also a good idea to know your test results and keep a list of the medicines you take. How can you care for yourself at home? · Take all medicines exactly as prescribed. Call your doctor if you think you are having a problem with your medicine. · Get some extra rest.  · Take an over-the-counter pain medicine, such as acetaminophen (Tylenol), ibuprofen (Advil, Motrin), or naproxen (Aleve) to reduce fever and relieve body aches. Read and follow all instructions on the label. · Do not take two or more pain medicines at the same time unless the doctor told you to. Many pain medicines have acetaminophen, which is Tylenol. Too much acetaminophen (Tylenol) can be harmful. · Take an over-the-counter cough medicine that contains dextromethorphan to help quiet a dry, hacking cough so that you can sleep. Avoid cough medicines that have more than one active ingredient. Read and follow all instructions on the label. · Breathe moist air from a humidifier, hot shower, or sink filled with hot water. The heat and moisture will thin mucus so you can cough it out. · Do not smoke. Smoking can make bronchitis worse. If you need help quitting, talk to your doctor about stop-smoking programs and medicines.  These can increase your chances of quitting for good.  When should you call for help? Call 911 anytime you think you may need emergency care. For example, call if:    · You have severe trouble breathing.    Call your doctor now or seek immediate medical care if:    · You have new or worse trouble breathing.     · You cough up dark brown or bloody mucus (sputum).     · You have a new or higher fever.     · You have a new rash.    Watch closely for changes in your health, and be sure to contact your doctor if:    · You cough more deeply or more often, especially if you notice more mucus or a change in the color of your mucus.     · You are not getting better as expected. Where can you learn more? Go to http://[employer]elvina[employer] Systemsolivier.info/. Enter H333 in the search box to learn more about \"Bronchitis: Care Instructions. \"  Current as of: June 9, 2019  Content Version: 12.2  © 8024-2679 [employer]. Care instructions adapted under license by [employer] (which disclaims liability or warranty for this information). If you have questions about a medical condition or this instruction, always ask your healthcare professional. [address] any warranty or liability for your use of this information. Patient Education        Learning About COPD and How to Prevent Lung Infections  How do lung infections affect COPD? Lung infections like pneumonia and acute bronchitis are common causes of COPD flare-ups. And people who have COPD are more likely to get these lung infections, especially if they smoke. When you have COPD, it is important to know the symptoms of pneumonia and acute bronchitis and call your doctor if you have them. Symptoms include:  · A cough that brings up more mucus than usual.  · Fever. · Shortness of breath. What can you do to prevent these infections? Stay healthy  · Get a flu shot every year. · Get a pneumococcal vaccine shot.  If you have had one before, ask your doctor whether you need another dose. Two different types of pneumococcal vaccines are recommended for people ages 72 and older. · If you must be around people with colds or the flu, wash your hands often. · Do not smoke. This is the most important step you can take to prevent more damage to your lungs. If you need help quitting, talk to your doctor about stop-smoking programs and medicines. These can increase your chances of quitting for good. · Avoid secondhand smoke, air pollution, and high altitudes. Also avoid cold, dry air and hot, humid air. Stay at home with your windows closed when air pollution is bad. Exercise and eat well  · If your doctor recommends it, get more exercise. Walking is a good choice. Bit by bit, increase the amount you walk every day. Try for at least 30 minutes on most days of the week. · Eat regular, well-balanced meals. Eating right keeps your energy levels up and helps your body fight infection. · Get plenty of rest and sleep. Follow-up care is a key part of your treatment and safety. Be sure to make and go to all appointments, and call your doctor if you are having problems. It's also a good idea to know your test results and keep a list of the medicines you take. Where can you learn more? Go to http://etelvina-olivier.info/. Enter J506 in the search box to learn more about \"Learning About COPD and How to Prevent Lung Infections. \"  Current as of: June 9, 2019  Content Version: 12.2  © 8922-9818 [employer]. Care instructions adapted under license by [employer] (which disclaims liability or warranty for this information). If you have questions about a medical condition or this instruction, always ask your healthcare professional. [address] any warranty or liability for your use of this information.

## 2020-01-12 NOTE — ED PROVIDER NOTES
EMERGENCY DEPARTMENT HISTORY AND PHYSICAL EXAM    9:19 PM      Date: 1/11/2020  Patient Name: Subha Hernandes    History of Presenting Illness     Chief Complaint   Patient presents with    Shortness of Breath         History Provided By: Patient    Additional History (Context): Subha Hernandes is a 60 y.o. female with hypertension, hyperlipidemia and COPD who presents with shortness of breath. Patient has been coughing. Cough is dry. Patient denies fever. Patient has mild chest tightness and wheezing. She is tried her home nebulizer with no improvement. Patient denies nausea, vomiting or diarrhea. Patient denies any smoking, alcohol or recreational drug use. Kelly Frey PCP: Giacomo Ruffin MD        Current Facility-Administered Medications   Medication Dose Route Frequency Provider Last Rate Last Dose    sodium chloride (NS) flush 5-10 mL  5-10 mL IntraVENous PRN Daren Jauregui DO        cefTRIAXone (ROCEPHIN) 2 g in sterile water (preservative free) 20 mL IV syringe  2 g IntraVENous Q24H Daren Jauregui DO   2 g at 01/11/20 2004    azithromycin (ZITHROMAX) 500 mg in  mL  500 mg IntraVENous Q24H Daren Jauregui DO   500 mg at 01/11/20 2003    sodium chloride 0.9 % bolus infusion 1,000 mL  1,000 mL IntraVENous ONCE JuventinoMelony greene DO        Followed by   Velta Ganser sodium chloride 0.9 % bolus infusion 1,000 mL  1,000 mL IntraVENous ONCE JuventinoMelindaa Christo, DO        Followed by   Velta Ganser sodium chloride 0.9 % bolus infusion 360 mL  360 mL IntraVENous ONCE JuventinoMelony greene DO         Current Outpatient Medications   Medication Sig Dispense Refill    azithromycin (ZITHROMAX Z-WESLEY) 250 mg tablet Take two tablets on day one then one tablet daily for the next 4 days. 6 Tab 0    predniSONE (DELTASONE) 10 mg tablet Take 10 mg by mouth every other day.       insulin glargine (LANTUS) 100 unit/mL injection Take 37u QHS while on steroids, then return to 35u QHS when not taking increased steroids 1 Vial 0    albuterol-ipratropium (DUO-NEB) 2.5 mg-0.5 mg/3 ml nebu 3 mL by Nebulization route every four (4) hours as needed (SOB). 30 Nebule 0    levoFLOXacin (LEVAQUIN) 500 mg tablet Take 1 Tab by mouth every twenty-four (24) hours. 4 Tab 0    predniSONE (DELTASONE) 10 mg tablet Take 30mg BID for 7 days total, then 50mg daily for 7 days,40 mg daily for 7 days, 30mg daily for 7 days, 20mg daily for 7 days, 10 mg 131 Tab 0    simethicone (GAS-X) 125 mg capsule Take 125 mg by mouth four (4) times daily as needed for Flatulence.  loratadine (CLARITIN) 10 mg tablet Take 10 mg by mouth daily as needed for Allergies.  HYPER-SAL 3.5 % nebu USE 1 VIAL IN NEBULIZER TWICE DAILY  6    lisinopril (PRINIVIL, ZESTRIL) 20 mg tablet Take 20 mg by mouth two (2) times a day.  albuterol sulfate (PROVENTIL;VENTOLIN) 2.5 mg/0.5 mL nebu nebulizer solution 0.5 mL by Nebulization route four (4) times daily as needed for Wheezing. To be used with HyperSal nebulizer solution. ICD code: COPD J44.1 60 mL 3    fluticasone propionate (FLONASE ALLERGY RELIEF) 50 mcg/actuation nasal spray 2 Sprays by Both Nostrils route daily.  fluticasone propion-salmeterol (ADVAIR HFA) 230-21 mcg/actuation inhaler Take 2 Puffs by inhalation two (2) times a day.  hydroCHLOROthiazide (HYDRODIURIL) 12.5 mg tablet Take 12.5 mg by mouth daily.  tiotropium bromide (SPIRIVA RESPIMAT) 2.5 mcg/actuation inhaler Take 2 Puffs by inhalation daily.  insulin glargine (LANTUS,BASAGLAR) 100 unit/mL (3 mL) inpn 35 Units by SubCUTAneous route nightly. (Patient taking differently: 32 Units by SubCUTAneous route nightly. Indications: type 2 diabetes mellitus) 28 Units 0    albuterol sulfate 90 mcg/actuation aepb Take 1 Puff by inhalation every four (4) hours.  (Patient taking differently: Take 1 Puff by inhalation every four (4) hours as needed.) 1 Inhaler 0    NOVOLOG FLEXPEN U-100 INSULIN 100 unit/mL inpn Continue home Sliding scale insulin as prior to admission (Patient taking differently: 1 Units by SubCUTAneous route three (3) times daily. If BG <100=0u  101-150=5u  151-250=8u  251-300=12u  >300 call MD  ) 1 Pen 0    omeprazole (PRILOSEC) 40 mg capsule Take 40 mg by mouth daily. Indications: gastroesophageal reflux disease      cpap machine kit by Does Not Apply route nightly. [place] [facility] Trigreg[facility]/Dr. Gabriella Omer      OXYGEN-AIR DELIVERY SYSTEMS 2 L by Nasal route continuous. [facility]      aspirin delayed-release 81 mg tablet Take 81 mg by mouth daily.  famotidine (PEPCID) 20 mg tablet Take 20 mg by mouth two (2) times daily as needed for Other (reflux).  montelukast (SINGULAIR) 10 mg tablet Take 10 mg by mouth nightly.          Past History     Past Medical History:  Past Medical History:   Diagnosis Date    Asthma     Chronic lung disease     COPD     Cystocele, midline     Diabetes mellitus ([address]     GERD (gastroesophageal reflux disease)     Hidradenitis suppurativa     Hyperlipidemia     Hypertension     SHERRIE on CPAP     CPAP    Stress incontinence        Past Surgical History:  Past Surgical History:   Procedure Laterality Date    BREAST SURGERY PROCEDURE UNLISTED      Right breast benign tumor removal    HX APPENDECTOMY      HX CHOLECYSTECTOMY      HX DILATION AND CURETTAGE      Dysfunctional uterine bleeding, thought 2/2 fibroids    HX TUBAL LIGATION         Family History:  Family History   Problem Relation Age of Onset    Hypertension Mother     Stroke Mother     Breast Cancer Mother         Bilateral mastectomies    Cancer Mother         ovarian and breast    Heart Failure Mother     Heart Attack Father         2011    Heart Surgery Father         CABG    Heart Failure Father     COPD Sister         Heavy smoker    Cancer Sister         ovarian    Heart Failure Sister     Lung Disease Sister     Asthma Child     Cancer Maternal Aunt         pancreatic    Cancer Maternal Grandfather stomach       Social History:  Social History     Tobacco Use    Smoking status: Former Smoker     Packs/day: 1.00     Years: 30.00     Pack years: 30.00     Types: Cigarettes     Start date: 1966     Last attempt to quit: 2006     Years since quittin.3    Smokeless tobacco: Never Used    Tobacco comment: 1-1.5 packs per day   Substance Use Topics    Alcohol use: No    Drug use: No       Allergies: Allergies   Allergen Reactions    Ancef [Cefazolin] Hives    Contrast Agent [Iodine] Anaphylaxis, Shortness of Breath and Swelling     Needs pre-medication for IV contrast with Benadryl, Solu-Medrol    Fish Containing Products Anaphylaxis     Pt states she had a severe allergic reaction at 9 y/o.  Statins-Hmg-Coa Reductase Inhibitors Myalgia    Metformin Other (comments)     Abdominal pain, diarrhea.  Codeine Other (comments)     Altered mental status         Review of Systems       Review of Systems   Constitutional: Negative. Negative for chills, diaphoresis and fever. HENT: Negative. Negative for congestion, rhinorrhea and sore throat. Eyes: Negative. Negative for pain, discharge and redness. Respiratory: Positive for cough, chest tightness, shortness of breath and wheezing. Cardiovascular: Negative. Negative for chest pain. Gastrointestinal: Negative. Negative for abdominal pain, constipation, diarrhea, nausea and vomiting. Genitourinary: Negative. Negative for dysuria, flank pain, frequency, hematuria and urgency. Musculoskeletal: Negative. Negative for back pain and neck pain. Skin: Negative. Negative for rash. Neurological: Negative. Negative for syncope, weakness, numbness and headaches. Psychiatric/Behavioral: Negative. All other systems reviewed and are negative.         Physical Exam     Visit Vitals  /87   Pulse 89   Temp 98.1 °F (36.7 °C)   Resp 25   Ht 5' 3\" (1.6 m)   Wt 112 kg (247 lb)   SpO2 96%   BMI 43.75 kg/m²         Physical Exam  Constitutional:       General: She is not in acute distress. Appearance: Normal appearance. She is well-developed. She is not ill-appearing, toxic-appearing or diaphoretic. HENT:      Head: Normocephalic and atraumatic. Mouth/Throat:      Pharynx: No oropharyngeal exudate. Eyes:      General: No scleral icterus. Conjunctiva/sclera: Conjunctivae normal.      Pupils: Pupils are equal, round, and reactive to light. Neck:      Musculoskeletal: Normal range of motion and neck supple. Thyroid: No thyromegaly. Vascular: No hepatojugular reflux or JVD. Trachea: No tracheal deviation. Cardiovascular:      Rate and Rhythm: Normal rate and regular rhythm. Pulses: Normal pulses. Radial pulses are 2+ on the right side and 2+ on the left side. Dorsalis pedis pulses are 2+ on the right side and 2+ on the left side. Heart sounds: Normal heart sounds, S1 normal and S2 normal. No murmur. No gallop. No S3 or S4 sounds. Pulmonary:      Effort: Accessory muscle usage present. No respiratory distress. Breath sounds: Examination of the right-upper field reveals rhonchi. Examination of the left-upper field reveals rhonchi. Examination of the right-lower field reveals rhonchi. Examination of the left-lower field reveals rhonchi. Rhonchi present. No decreased breath sounds, wheezing or rales. Abdominal:      General: Bowel sounds are normal. There is no distension. Palpations: Abdomen is soft. Abdomen is not rigid. There is no mass. Tenderness: There is no tenderness. There is no guarding or rebound. Negative signs include Duarte's sign and McBurney's sign. Musculoskeletal: Normal range of motion. Comments: Strength 4-5 throughout. Lymphadenopathy:      Head:      Right side of head: No submental, submandibular, preauricular or occipital adenopathy. Left side of head: No submental, submandibular, preauricular or occipital adenopathy. Cervical: No cervical adenopathy. Upper Body:      Right upper body: No supraclavicular adenopathy. Left upper body: No supraclavicular adenopathy. Skin:     General: Skin is warm and dry. Findings: No rash. Neurological:      Mental Status: She is alert. She is not disoriented. GCS: GCS eye subscore is 4. GCS verbal subscore is 5. GCS motor subscore is 6. Cranial Nerves: No cranial nerve deficit. Sensory: No sensory deficit. Coordination: Coordination normal.      Gait: Gait normal.      Deep Tendon Reflexes: Reflexes are normal and symmetric. Comments: Grossly intact. Psychiatric:         Speech: Speech normal.         Behavior: Behavior normal.         Thought Content: Thought content normal.         Judgment: Judgment normal.           Diagnostic Study Results     Labs -  Recent Results (from the past 12 hour(s))   URINALYSIS W/ RFLX MICROSCOPIC    Collection Time: 01/11/20  7:16 PM   Result Value Ref Range    Color YELLOW      Appearance CLEAR      Specific gravity 1.023 1.005 - 1.030      pH (UA) 6.5 5.0 - 8.0      Protein NEGATIVE  NEG mg/dL    Glucose >1,000 (A) NEG mg/dL    Ketone NEGATIVE  NEG mg/dL    Bilirubin NEGATIVE  NEG      Blood NEGATIVE  NEG      Urobilinogen 1.0 0.2 - 1.0 EU/dL    Nitrites NEGATIVE  NEG      Leukocyte Esterase NEGATIVE  NEG     METABOLIC PANEL, COMPREHENSIVE    Collection Time: 01/11/20  7:16 PM   Result Value Ref Range    Sodium 139 136 - 145 mmol/L    Potassium 4.2 3.5 - 5.5 mmol/L    Chloride 103 100 - 111 mmol/L    CO2 30 21 - 32 mmol/L    Anion gap 6 3.0 - 18 mmol/L    Glucose 304 (H) 74 - 99 mg/dL    BUN 13 7.0 - 18 MG/DL    Creatinine 0.93 0.6 - 1.3 MG/DL    BUN/Creatinine ratio 14 12 - 20      GFR est AA >60 >60 ml/min/1.73m2    GFR est non-AA >60 >60 ml/min/1.73m2    Calcium 9.4 8.5 - 10.1 MG/DL    Bilirubin, total 0.5 0.2 - 1.0 MG/DL    ALT (SGPT) 41 13 - 56 U/L    AST (SGOT) 27 10 - 38 U/L    Alk.  phosphatase 94 45 - 117 U/L    Protein, total 7.7 6.4 - 8.2 g/dL    Albumin 3.5 3.4 - 5.0 g/dL    Globulin 4.2 (H) 2.0 - 4.0 g/dL    A-G Ratio 0.8 0.8 - 1.7     CBC WITH AUTOMATED DIFF    Collection Time: 01/11/20  7:16 PM   Result Value Ref Range    WBC 7.5 4.6 - 13.2 K/uL    RBC 4.53 4.20 - 5.30 M/uL    HGB 13.6 12.0 - 16.0 g/dL    HCT 39.4 35.0 - 45.0 %    MCV 87.0 74.0 - 97.0 FL    MCH 30.0 24.0 - 34.0 PG    MCHC 34.5 31.0 - 37.0 g/dL    RDW 13.4 11.6 - 14.5 %    PLATELET 950 615 - 749 K/uL    MPV 9.2 9.2 - 11.8 FL    NEUTROPHILS 56 40 - 73 %    LYMPHOCYTES 36 21 - 52 %    MONOCYTES 6 3 - 10 %    EOSINOPHILS 2 0 - 5 %    BASOPHILS 0 0 - 2 %    ABS. NEUTROPHILS 4.2 1.8 - 8.0 K/UL    ABS. LYMPHOCYTES 2.7 0.9 - 3.6 K/UL    ABS. MONOCYTES 0.5 0.05 - 1.2 K/UL    ABS. EOSINOPHILS 0.2 0.0 - 0.4 K/UL    ABS.  BASOPHILS 0.0 0.0 - 0.1 K/UL    DF AUTOMATED     EKG, 12 LEAD, INITIAL    Collection Time: 01/11/20  7:56 PM   Result Value Ref Range    Ventricular Rate 89 BPM    Atrial Rate 89 BPM    P-R Interval 158 ms    QRS Duration 88 ms    Q-T Interval 388 ms    QTC Calculation (Bezet) 472 ms    Calculated P Axis 50 degrees    Calculated R Axis 23 degrees    Calculated T Axis 49 degrees    Diagnosis       Normal sinus rhythm  Normal ECG  When compared with ECG of 14-OCT-2019 10:05,  QT has lengthened     POC LACTIC ACID    Collection Time: 01/11/20  8:22 PM   Result Value Ref Range    Lactic Acid (POC) 3.39 (HH) 0.40 - 2.00 mmol/L       Radiologic Studies -   XR CHEST PORT    (Results Pending)         Medical Decision Making   Provider Notes (Medical Decision Making):  MDM  Number of Diagnoses or Management Options  Acute bronchitis, unspecified organism:   Diagnosis management comments: Shortness of breath etiologies include chronic obstructive pulmonary disease (COPD), acute asthma exacerbation, congestive heart failure, pneumonia, acute bronchitis, pulmonary embolism, upper respiratory infection, cardiac event to include acute coronary syndrome, acute myocardial infarction or a combination of the above (ex URI on top of COPD thus causing respiratory distress). I am the first provider for this patient. I reviewed the vital signs, available nursing notes, past medical history, past surgical history, family history and social history. Vital Signs-Reviewed the patient's vital signs. Records Reviewed: Nursing Notes (Time of Review: 9:19 PM)    ED Course: Progress Notes, Reevaluation, and Consults:    Labs essentially normal with the exception of lactic of 3.39, glucose of 304. Chest X-Ray showed mild left sided atelectasis versus infiltrate. EKG showed NSR with rate of 89 bpm. With no ST elevations or depression and non specific T wave changes. 9:19 PM 1/11/2020      Diagnosis       I have reassessed the patient. Patient is feeling much better. Patient will be prescribed Azithromycin and Prednisone. Patient was discharged in stable condition. Patient is to return to emergency department if any new or worsening condition. Clinical Impression:   1. Acute bronchitis, unspecified organism        Disposition: Discharged home    Follow-up Information     Follow up With Specialties Details Why Contact Info    Erika Celis MD Family Practice In 2 days  [address] [facility]  [phone]               Attestation        Provider Attestation:     I personally performed the services described in the documentation, reviewed the documentation and it accurately and completely records my words and actions utilizing the [address] January 11, 2020 at 9:19 PM - [place], [address]. It is dictated using utilizing voice recognition software. Unfortunately this leads to occasional typographical errors. I apologize in advance if the situation occurs. If questions arise please do not hesitate to contact me or call our department.

## 2020-01-13 ENCOUNTER — HOSPITAL ENCOUNTER (EMERGENCY)
Age: 60
Discharge: HOME | End: 2020-01-14
Attending: EMERGENCY MEDICINE
Payer: MEDICARE

## 2020-01-13 ENCOUNTER — APPOINTMENT (OUTPATIENT)
Dept: GENERAL RADIOLOGY | Age: 60
End: 2020-01-13
Attending: EMERGENCY MEDICINE
Payer: MEDICARE

## 2020-01-13 DIAGNOSIS — J44.1 ACUTE EXACERBATION OF CHRONIC OBSTRUCTIVE PULMONARY DISEASE (COPD) (HCC): Primary | ICD-10-CM

## 2020-01-13 LAB
ALBUMIN SERPL-MCNC: 3.6 G/DL (ref 3.4–5)
ALBUMIN/GLOB SERPL: 0.9 {RATIO} (ref 0.8–1.7)
ALP SERPL-CCNC: 77 U/L (ref 45–117)
ALT SERPL-CCNC: 36 U/L (ref 13–56)
ANION GAP SERPL CALC-SCNC: 1 MMOL/L (ref 3–18)
AST SERPL-CCNC: 17 U/L (ref 10–38)
BASOPHILS # BLD: 0 K/UL (ref 0–0.1)
BASOPHILS NFR BLD: 0 % (ref 0–2)
BILIRUB SERPL-MCNC: 0.5 MG/DL (ref 0.2–1)
BNP SERPL-MCNC: 107 PG/ML (ref 0–900)
BUN SERPL-MCNC: 16 MG/DL (ref 7–18)
BUN/CREAT SERPL: 20 (ref 12–20)
CALCIUM SERPL-MCNC: 9 MG/DL (ref 8.5–10.1)
CHLORIDE SERPL-SCNC: 104 MMOL/L (ref 100–111)
CO2 SERPL-SCNC: 33 MMOL/L (ref 21–32)
CREAT SERPL-MCNC: 0.82 MG/DL (ref 0.6–1.3)
DIFFERENTIAL METHOD BLD: ABNORMAL
EOSINOPHIL # BLD: 0.1 K/UL (ref 0–0.4)
EOSINOPHIL NFR BLD: 1 % (ref 0–5)
ERYTHROCYTE [DISTWIDTH] IN BLOOD BY AUTOMATED COUNT: 13.6 % (ref 11.6–14.5)
GLOBULIN SER CALC-MCNC: 3.9 G/DL (ref 2–4)
GLUCOSE SERPL-MCNC: 136 MG/DL (ref 74–99)
HCT VFR BLD AUTO: 40 % (ref 35–45)
HGB BLD-MCNC: 13.7 G/DL (ref 12–16)
LACTATE BLD-SCNC: 1.47 MMOL/L (ref 0.4–2)
LYMPHOCYTES # BLD: 2.5 K/UL (ref 0.9–3.6)
LYMPHOCYTES NFR BLD: 35 % (ref 21–52)
MCH RBC QN AUTO: 30 PG (ref 24–34)
MCHC RBC AUTO-ENTMCNC: 34.3 G/DL (ref 31–37)
MCV RBC AUTO: 87.5 FL (ref 74–97)
MONOCYTES # BLD: 0.5 K/UL (ref 0.05–1.2)
MONOCYTES NFR BLD: 6 % (ref 3–10)
NEUTS SEG # BLD: 4.1 K/UL (ref 1.8–8)
NEUTS SEG NFR BLD: 58 % (ref 40–73)
PLATELET # BLD AUTO: 279 K/UL (ref 135–420)
PMV BLD AUTO: 9.1 FL (ref 9.2–11.8)
POTASSIUM SERPL-SCNC: 3.6 MMOL/L (ref 3.5–5.5)
PROT SERPL-MCNC: 7.5 G/DL (ref 6.4–8.2)
RBC # BLD AUTO: 4.57 M/UL (ref 4.2–5.3)
SODIUM SERPL-SCNC: 138 MMOL/L (ref 136–145)
TROPONIN I SERPL-MCNC: <0.02 NG/ML (ref 0–0.04)
WBC # BLD AUTO: 7.1 K/UL (ref 4.6–13.2)

## 2020-01-13 PROCEDURE — 74011250637 HC RX REV CODE- 250/637: Performed by: EMERGENCY MEDICINE

## 2020-01-13 PROCEDURE — 96365 THER/PROPH/DIAG IV INF INIT: CPT

## 2020-01-13 PROCEDURE — 74011636637 HC RX REV CODE- 636/637: Performed by: EMERGENCY MEDICINE

## 2020-01-13 PROCEDURE — 96366 THER/PROPH/DIAG IV INF ADDON: CPT

## 2020-01-13 PROCEDURE — 71045 X-RAY EXAM CHEST 1 VIEW: CPT

## 2020-01-13 PROCEDURE — 83880 ASSAY OF NATRIURETIC PEPTIDE: CPT

## 2020-01-13 PROCEDURE — 83605 ASSAY OF LACTIC ACID: CPT

## 2020-01-13 PROCEDURE — 85025 COMPLETE CBC W/AUTO DIFF WBC: CPT

## 2020-01-13 PROCEDURE — 96367 TX/PROPH/DG ADDL SEQ IV INF: CPT

## 2020-01-13 PROCEDURE — 84484 ASSAY OF TROPONIN QUANT: CPT

## 2020-01-13 PROCEDURE — 74011250636 HC RX REV CODE- 250/636: Performed by: EMERGENCY MEDICINE

## 2020-01-13 PROCEDURE — 87040 BLOOD CULTURE FOR BACTERIA: CPT

## 2020-01-13 PROCEDURE — 74011000250 HC RX REV CODE- 250: Performed by: EMERGENCY MEDICINE

## 2020-01-13 PROCEDURE — 99285 EMERGENCY DEPT VISIT HI MDM: CPT

## 2020-01-13 PROCEDURE — A9270 NON-COVERED ITEM OR SERVICE: HCPCS | Performed by: EMERGENCY MEDICINE

## 2020-01-13 PROCEDURE — 93005 ELECTROCARDIOGRAM TRACING: CPT

## 2020-01-13 PROCEDURE — 80053 COMPREHEN METABOLIC PANEL: CPT

## 2020-01-13 RX ORDER — DOXYCYCLINE 100 MG/1
100 CAPSULE ORAL
Status: COMPLETED | OUTPATIENT
Start: 2020-01-13 | End: 2020-01-13

## 2020-01-13 RX ORDER — ALBUTEROL SULFATE 0.83 MG/ML
10 SOLUTION RESPIRATORY (INHALATION)
Status: COMPLETED | OUTPATIENT
Start: 2020-01-13 | End: 2020-01-13

## 2020-01-13 RX ORDER — LEVOFLOXACIN 5 MG/ML
750 INJECTION, SOLUTION INTRAVENOUS EVERY 24 HOURS
Status: DISCONTINUED | OUTPATIENT
Start: 2020-01-13 | End: 2020-01-14 | Stop reason: HOSPADM

## 2020-01-13 RX ORDER — VANCOMYCIN 2 GRAM/500 ML IN 0.9 % SODIUM CHLORIDE INTRAVENOUS
2000 ONCE
Status: COMPLETED | OUTPATIENT
Start: 2020-01-13 | End: 2020-01-14

## 2020-01-13 RX ORDER — PREDNISONE 20 MG/1
60 TABLET ORAL
Status: COMPLETED | OUTPATIENT
Start: 2020-01-13 | End: 2020-01-13

## 2020-01-13 RX ORDER — IPRATROPIUM BROMIDE AND ALBUTEROL SULFATE 2.5; .5 MG/3ML; MG/3ML
3 SOLUTION RESPIRATORY (INHALATION) ONCE
Status: COMPLETED | OUTPATIENT
Start: 2020-01-13 | End: 2020-01-13

## 2020-01-13 RX ADMIN — VANCOMYCIN HYDROCHLORIDE 2000 MG: 10 INJECTION, POWDER, LYOPHILIZED, FOR SOLUTION INTRAVENOUS at 22:13

## 2020-01-13 RX ADMIN — DOXYCYCLINE HYCLATE 100 MG: 100 CAPSULE ORAL at 20:07

## 2020-01-13 RX ADMIN — PREDNISONE 60 MG: 20 TABLET ORAL at 20:07

## 2020-01-13 RX ADMIN — LEVOFLOXACIN 750 MG: 5 INJECTION, SOLUTION INTRAVENOUS at 20:09

## 2020-01-13 RX ADMIN — IPRATROPIUM BROMIDE AND ALBUTEROL SULFATE 3 ML: .5; 3 SOLUTION RESPIRATORY (INHALATION) at 19:55

## 2020-01-13 RX ADMIN — ALBUTEROL SULFATE 10 MG: 2.5 SOLUTION RESPIRATORY (INHALATION) at 19:44

## 2020-01-13 RX ADMIN — ALBUTEROL SULFATE 10 MG: 2.5 SOLUTION RESPIRATORY (INHALATION) at 20:00

## 2020-01-13 NOTE — ED TRIAGE NOTES
The patient was here two days ago for shortness of breath. She was asked to return because she has positive blood cultures from two days ago. States, \"I still feel short of breath. \"

## 2020-01-13 NOTE — CALL BACK NOTE
Called patient's number after receiving tech call from microbiology lab that patient's blood culture test came back positive. I called and spoke with her daughter; daughter said she was just speaking with her mom who said she is not feeling well. Instructed daughter to tell mother to return to the emergency department immediately. Patient's daughter states she will contact mother and instruct the same.  - bobby solo

## 2020-01-14 ENCOUNTER — DOCUMENTATION ONLY (OUTPATIENT)
Dept: PULMONOLOGY | Age: 60
End: 2020-01-14

## 2020-01-14 VITALS
BODY MASS INDEX: 43.77 KG/M2 | WEIGHT: 247 LBS | DIASTOLIC BLOOD PRESSURE: 72 MMHG | RESPIRATION RATE: 23 BRPM | SYSTOLIC BLOOD PRESSURE: 159 MMHG | TEMPERATURE: 98.5 F | HEART RATE: 82 BPM | HEIGHT: 63 IN | OXYGEN SATURATION: 97 %

## 2020-01-14 LAB
ATRIAL RATE: 63 BPM
CALCULATED P AXIS, ECG09: 66 DEGREES
CALCULATED R AXIS, ECG10: 50 DEGREES
CALCULATED T AXIS, ECG11: 56 DEGREES
DIAGNOSIS, 93000: NORMAL
P-R INTERVAL, ECG05: 158 MS
Q-T INTERVAL, ECG07: 418 MS
QRS DURATION, ECG06: 86 MS
QTC CALCULATION (BEZET), ECG08: 427 MS
VENTRICULAR RATE, ECG03: 63 BPM

## 2020-01-14 PROCEDURE — 96366 THER/PROPH/DIAG IV INF ADDON: CPT

## 2020-01-14 RX ORDER — VANCOMYCIN HYDROCHLORIDE
1250 EVERY 12 HOURS
Status: DISCONTINUED | OUTPATIENT
Start: 2020-01-14 | End: 2020-01-14 | Stop reason: HOSPADM

## 2020-01-14 NOTE — PROGRESS NOTES
Patient on Home Vent Trilogy    DME: MED    AVAPs-AE (Trilogy)- Average use Summary    Date   IPAP/EPAP   RR bpm   Vt   Vte  (L/min)   Triggered Breaths (%)   # days used  Avg Hr use/day Notes:    12/10/19-01/08/20 16.5/8.6 24.8 374 8.4 98 28/30 4.9 Leak: 60.8                             Compliance data from DME.   Will scan into media tab    Sim Caldwell, DO, [facility]    [facility]  Pulmonary, Critical Care, and Sleep Medicine

## 2020-01-14 NOTE — DISCHARGE INSTRUCTIONS

## 2020-01-14 NOTE — ED PROVIDER NOTES
EMERGENCY DEPARTMENT HISTORY AND PHYSICAL EXAM    8:13 PM  Date: 1/13/2020  Patient Name: Gene Rivers    History of Presenting Illness     Chief Complaint   Patient presents with    Shortness of Breath        History Provided By: Patient    HPI: Gene Rivers is a 60 y.o. female with history multiple medical problems as below including COPD on home O2. Patient was seen yesterday for COPD exacerbation and had improvement of her symptoms and was discharged home. She was called back today because she had positive blood cultures. Patient is complaining of worsening of her shortness of breath and tachypnea. Denies fever, nausea or vomiting. No new symptoms today. Location:  Severity:  Timing/course:    Onset/Duration:     PCP: Jodie Cortés MD    Past History     Past Medical History:  Past Medical History:   Diagnosis Date    Asthma     Chronic lung disease     COPD     Cystocele, midline     Diabetes mellitus ([facility] [place] [address]     GERD (gastroesophageal reflux disease)     Hidradenitis suppurativa     Hyperlipidemia     Hypertension     SHERRIE on CPAP     CPAP    Stress incontinence        Past Surgical History:  Past Surgical History:   Procedure Laterality Date    BREAST SURGERY PROCEDURE UNLISTED      Right breast benign tumor removal    HX APPENDECTOMY      HX CHOLECYSTECTOMY      HX DILATION AND CURETTAGE      Dysfunctional uterine bleeding, thought 2/2 fibroids    HX TUBAL LIGATION         Family History:  Family History   Problem Relation Age of Onset    Hypertension Mother     Stroke Mother     Breast Cancer Mother         Bilateral mastectomies    Cancer Mother         ovarian and breast    Heart Failure Mother     Heart Attack Father         2011    Heart Surgery Father         CABG    Heart Failure Father     COPD Sister         Heavy smoker    Cancer Sister         ovarian    Heart Failure Sister     Lung Disease Sister     Asthma Child     Cancer Maternal Aunt         pancreatic    Cancer Maternal Grandfather         stomach       Social History:  Social History     Tobacco Use    Smoking status: Former Smoker     Packs/day: 1.00     Years: 30.00     Pack years: 30.00     Types: Cigarettes     Start date: 1966     Last attempt to quit: 2006     Years since quittin.3    Smokeless tobacco: Never Used    Tobacco comment: 1-1.5 packs per day   Substance Use Topics    Alcohol use: No    Drug use: No       Allergies: Allergies   Allergen Reactions    Ancef [Cefazolin] Hives    Contrast Agent [Iodine] Anaphylaxis, Shortness of Breath and Swelling     Needs pre-medication for IV contrast with Benadryl, Solu-Medrol    Fish Containing Products Anaphylaxis     Pt states she had a severe allergic reaction at 7 y/o.  Statins-Hmg-Coa Reductase Inhibitors Myalgia    Metformin Other (comments)     Abdominal pain, diarrhea.  Codeine Other (comments)     Altered mental status       Review of Systems   Review of Systems   Respiratory: Positive for cough, shortness of breath and wheezing. All other systems reviewed and are negative. Physical Exam     Patient Vitals for the past 12 hrs:   Temp Pulse Resp BP SpO2   20 -- 71 -- 191/86 --   20 1639 98.5 °F (36.9 °C) 72 24 (!) 192/94 99 %       Physical Exam  Vitals signs and nursing note reviewed. Constitutional:       Appearance: She is well-developed. HENT:      Head: Normocephalic and atraumatic. Eyes:      Extraocular Movements: Extraocular movements intact. Cardiovascular:      Rate and Rhythm: Normal rate. Pulmonary:      Effort: Tachypnea present. Breath sounds: Wheezing and rhonchi present. Musculoskeletal: Normal range of motion. Skin:     General: Skin is warm and dry. Neurological:      General: No focal deficit present. Mental Status: She is alert and oriented to person, place, and time.    Psychiatric:         Mood and Affect: Mood normal. Behavior: Behavior normal.         Diagnostic Study Results     Labs -  Recent Results (from the past 12 hour(s))   METABOLIC PANEL, COMPREHENSIVE    Collection Time: 01/13/20  7:15 PM   Result Value Ref Range    Sodium 138 136 - 145 mmol/L    Potassium 3.6 3.5 - 5.5 mmol/L    Chloride 104 100 - 111 mmol/L    CO2 33 (H) 21 - 32 mmol/L    Anion gap 1 (L) 3.0 - 18 mmol/L    Glucose 136 (H) 74 - 99 mg/dL    BUN 16 7.0 - 18 MG/DL    Creatinine 0.82 0.6 - 1.3 MG/DL    BUN/Creatinine ratio 20 12 - 20      GFR est AA >60 >60 ml/min/1.73m2    GFR est non-AA >60 >60 ml/min/1.73m2    Calcium 9.0 8.5 - 10.1 MG/DL    Bilirubin, total 0.5 0.2 - 1.0 MG/DL    ALT (SGPT) 36 13 - 56 U/L    AST (SGOT) 17 10 - 38 U/L    Alk. phosphatase 77 45 - 117 U/L    Protein, total 7.5 6.4 - 8.2 g/dL    Albumin 3.6 3.4 - 5.0 g/dL    Globulin 3.9 2.0 - 4.0 g/dL    A-G Ratio 0.9 0.8 - 1.7     CBC WITH AUTOMATED DIFF    Collection Time: 01/13/20  7:15 PM   Result Value Ref Range    WBC 7.1 4.6 - 13.2 K/uL    RBC 4.57 4.20 - 5.30 M/uL    HGB 13.7 12.0 - 16.0 g/dL    HCT 40.0 35.0 - 45.0 %    MCV 87.5 74.0 - 97.0 FL    MCH 30.0 24.0 - 34.0 PG    MCHC 34.3 31.0 - 37.0 g/dL    RDW 13.6 11.6 - 14.5 %    PLATELET 118 276 - 568 K/uL    MPV 9.1 (L) 9.2 - 11.8 FL    NEUTROPHILS 58 40 - 73 %    LYMPHOCYTES 35 21 - 52 %    MONOCYTES 6 3 - 10 %    EOSINOPHILS 1 0 - 5 %    BASOPHILS 0 0 - 2 %    ABS. NEUTROPHILS 4.1 1.8 - 8.0 K/UL    ABS. LYMPHOCYTES 2.5 0.9 - 3.6 K/UL    ABS. MONOCYTES 0.5 0.05 - 1.2 K/UL    ABS. EOSINOPHILS 0.1 0.0 - 0.4 K/UL    ABS.  BASOPHILS 0.0 0.0 - 0.1 K/UL    DF AUTOMATED     TROPONIN I    Collection Time: 01/13/20  7:15 PM   Result Value Ref Range    Troponin-I, QT <0.02 0.0 - 0.045 NG/ML   EKG, 12 LEAD, INITIAL    Collection Time: 01/13/20  7:17 PM   Result Value Ref Range    Ventricular Rate 63 BPM    Atrial Rate 63 BPM    P-R Interval 158 ms    QRS Duration 86 ms    Q-T Interval 418 ms    QTC Calculation (Bezet) 427 ms Calculated P Axis 66 degrees    Calculated R Axis 50 degrees    Calculated T Axis 56 degrees    Diagnosis       Normal sinus rhythm  Cannot rule out Anterior infarct , age undetermined  Abnormal ECG  When compared with ECG of 11-JAN-2020 19:56,  No significant change was found     POC LACTIC ACID    Collection Time: 01/13/20  7:29 PM   Result Value Ref Range    Lactic Acid (POC) 1.47 0.40 - 2.00 mmol/L       Radiologic Studies -   No results found. Medical Decision Making     ED Course: Progress Notes, Reevaluation, and Consults:    8:13 PM Initial assessment performed. The patients presenting problems have been discussed, and they/their family are in agreement with the care plan formulated and outlined with them. I have encouraged them to ask questions as they arise throughout their visit. 9:33 PM  Patient reports significant improvement of her symptoms and that she still wheezing but not as extensively only expiratory wheezing. I discussed the results with her and she agreed on the plan of discharge and continue taking her outpatient regimen of prednisone, azithromycin and here inhalers. I provided her with strict return precautions in case she got worse or became febrile. I really have very low concern for the blood cultures to be accurate is a most likely contamination from skin samina. Provider Notes (Medical Decision Making): 70-year-old with multiple medical problems including COPD called back for positive blood cultures. The blood cultures had gram-positive cocci in 1 of the bottles, likely skin contamination. However will recheck her labs. Patient vitals are within normal and she is afebrile. Except for wheezing and tachypnea she is otherwise well-appearing. We will start her on doxy for COPD exacerbation, nebs, steroids then reevaluate for dispo    Procedures:     Critical Care Time:     Vital Signs-Reviewed the patient's vital signs. Reviewed pt's pulse ox reading. EKG:   Interpreted by the EP. Time Interpreted:    Rate:    Rhythm:    Interpretation:   Comparison:     Records Reviewed: Nursing Notes (Time of Review: 8:13 PM)  -I am the first provider for this patient.  -I reviewed the vital signs, available nursing notes, past medical history, past surgical history, family history and social history. Current Facility-Administered Medications   Medication Dose Route Frequency Provider Last Rate Last Dose    levoFLOXacin (LEVAQUIN) 750 mg in D5W IVPB  750 mg IntraVENous Q24H Britt Nathan  mL/hr at 01/13/20 2009 750 mg at 01/13/20 2009    vancomycin (VANCOCIN) 2000 mg in  ml infusion  2,000 mg IntraVENous ONCE Britt Nathan PA        VANCOMYCIN INFORMATION NOTE   Other Rx Dosing/Monitoring ANALI Post         Current Outpatient Medications   Medication Sig Dispense Refill    azithromycin (ZITHROMAX Z-WESLEY) 250 mg tablet Take two tablets on day one then one tablet daily for the next 4 days. 6 Tab 0    predniSONE (DELTASONE) 50 mg tablet Take 1 Tab by mouth daily for 5 days. 5 Tab 0    insulin glargine (LANTUS) 100 unit/mL injection Take 37u QHS while on steroids, then return to 35u QHS when not taking increased steroids 1 Vial 0    albuterol-ipratropium (DUO-NEB) 2.5 mg-0.5 mg/3 ml nebu 3 mL by Nebulization route every four (4) hours as needed (SOB). 30 Nebule 0    levoFLOXacin (LEVAQUIN) 500 mg tablet Take 1 Tab by mouth every twenty-four (24) hours. 4 Tab 0    simethicone (GAS-X) 125 mg capsule Take 125 mg by mouth four (4) times daily as needed for Flatulence.  loratadine (CLARITIN) 10 mg tablet Take 10 mg by mouth daily as needed for Allergies.  HYPER-SAL 3.5 % nebu USE 1 VIAL IN NEBULIZER TWICE DAILY  6    lisinopril (PRINIVIL, ZESTRIL) 20 mg tablet Take 20 mg by mouth two (2) times a day.  albuterol sulfate (PROVENTIL;VENTOLIN) 2.5 mg/0.5 mL nebu nebulizer solution 0.5 mL by Nebulization route four (4) times daily as needed for Wheezing.  To be used with HyperSal nebulizer solution. ICD code: COPD J44.1 60 mL 3    fluticasone propionate (FLONASE ALLERGY RELIEF) 50 mcg/actuation nasal spray 2 Sprays by Both Nostrils route daily.  fluticasone propion-salmeterol (ADVAIR HFA) 230-21 mcg/actuation inhaler Take 2 Puffs by inhalation two (2) times a day.  hydroCHLOROthiazide (HYDRODIURIL) 12.5 mg tablet Take 12.5 mg by mouth daily.  tiotropium bromide (SPIRIVA RESPIMAT) 2.5 mcg/actuation inhaler Take 2 Puffs by inhalation daily.  insulin glargine (LANTUS,BASAGLAR) 100 unit/mL (3 mL) inpn 35 Units by SubCUTAneous route nightly. (Patient taking differently: 32 Units by SubCUTAneous route nightly. Indications: type 2 diabetes mellitus) 28 Units 0    albuterol sulfate 90 mcg/actuation aepb Take 1 Puff by inhalation every four (4) hours. (Patient taking differently: Take 1 Puff by inhalation every four (4) hours as needed.) 1 Inhaler 0    NOVOLOG FLEXPEN U-100 INSULIN 100 unit/mL inpn Continue home Sliding scale insulin as prior to admission (Patient taking differently: 1 Units by SubCUTAneous route three (3) times daily. If BG <100=0u  101-150=5u  151-250=8u  251-300=12u  >300 call MD  ) 1 Pen 0    omeprazole (PRILOSEC) 40 mg capsule Take 40 mg by mouth daily. Indications: gastroesophageal reflux disease      cpap machine kit by Does Not Apply route nightly. [place] [facility] Remy/Dr. Yocasta Simpson      OXYGEN-AIR DELIVERY SYSTEMS 2 L by Nasal route continuous. [facility]      aspirin delayed-release 81 mg tablet Take 81 mg by mouth daily.  famotidine (PEPCID) 20 mg tablet Take 20 mg by mouth two (2) times daily as needed for Other (reflux).  montelukast (SINGULAIR) 10 mg tablet Take 10 mg by mouth nightly. Clinical Impression     Clinical Impression: No diagnosis found. Disposition: This note was dictated utilizing voice recognition software which may lead to typographical errors.   I apologize in advance if the situation occurs. If questions arise please do not hesitate to contact me or call our department.     Nathanael Mcgarry MD  8:13 PM

## 2020-01-15 NOTE — ED NOTES
7:53 AM  Call from microbiology lab, pt's blood cultures on 1/11 positive, aerobic bottle gram positive rods. Pt returned to the ED on 1/13 with negative blood cultures x 2 at that time. Will await final blood culture results from 1/11.

## 2020-01-17 LAB
BACTERIA SPEC CULT: NORMAL
SERVICE CMNT-IMP: NORMAL

## 2020-02-03 ENCOUNTER — OFFICE VISIT (OUTPATIENT)
Dept: PULMONOLOGY | Age: 60
End: 2020-02-03

## 2020-02-03 VITALS
HEIGHT: 63 IN | BODY MASS INDEX: 45.75 KG/M2 | TEMPERATURE: 95.3 F | DIASTOLIC BLOOD PRESSURE: 76 MMHG | SYSTOLIC BLOOD PRESSURE: 148 MMHG | HEART RATE: 82 BPM | RESPIRATION RATE: 20 BRPM | OXYGEN SATURATION: 92 % | WEIGHT: 258.2 LBS

## 2020-02-03 DIAGNOSIS — I10 ESSENTIAL HYPERTENSION, BENIGN: ICD-10-CM

## 2020-02-03 DIAGNOSIS — J44.9 COPD WITH ASTHMA (HCC): ICD-10-CM

## 2020-02-03 DIAGNOSIS — E66.01 MORBID OBESITY WITH BMI OF 40.0-44.9, ADULT (HCC): ICD-10-CM

## 2020-02-03 DIAGNOSIS — Z99.89 OSA ON CPAP: Primary | ICD-10-CM

## 2020-02-03 DIAGNOSIS — G47.33 OSA ON CPAP: Primary | ICD-10-CM

## 2020-02-03 RX ORDER — PREDNISONE 10 MG/1
10 TABLET ORAL EVERY OTHER DAY
COMMUNITY
End: 2020-02-29

## 2020-02-03 RX ORDER — BROMFENAC SODIUM 0.7 MG/ML
1 SOLUTION/ DROPS OPHTHALMIC DAILY
COMMUNITY
Start: 2019-12-19 | End: 2020-04-08 | Stop reason: ALTCHOICE

## 2020-02-03 NOTE — PROGRESS NOTES
[facility]  Pulmonary, Critical Care, and Sleep Medicine    Office Progress Note      Primary Care Physician: Lynette Mayes MD     Reason for Visit:  Evaluation for SHERRIE and CPAP therapy      Assessment:  1. COPD/Asthma on LTOT: 2LPM- Followd by Dr. Pj Duncan. Patient continue with frequent COPD exacerbations requiring hospitalization and NIV. She has been hospitalized 4 times since January 2019. She is completing steroid taper and is steroid dependent. She reports compliance with medications. Did not tolerate Daliresp. She seems to be doing a little bit better on NIV. I am concerned that she has an environmental trigger in her home. Absolute eosinophil count elevated on 2 occassions (200 and 300). IgE in 2018 was 157. Her apartment lease is up in Feb and she and her daughter are planning to move to a new location at that time. 2. Obstructive Sleep Apnea (SHERRIE) with chronic hypoxic respiratory failure- Intolerant to CPAP. Patient doing much better on NIV. Currently on Trilogy: AVAPS-AE Patient reports continued clinical benefit and that she is able to achieve deep and restorative sleep which she was not able to do before    3. 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   5. Poor Sleep Hygiene- multifactorial, some areas improved and others not. Recent prolonged steroid taper with mood swings  6. Diabetes- higher blood sugars with systemic steroids. Should improve as taper down on steroids. 7. Hypertension- BP elevated today- assymptomatic  8. Morbid Obesity: Body mass index is 44.29 kg/m².     Plan:  · Continue with AVAPs-AE with supplemental oxygen at 2LPM and pulse dose 2-3LPM  · Follow up with Dr. Pj Duncan for COPD/Asthma-Patient may be a candidate for biologic such as Lylia Shutters or Nucala- I have discussed this with Dr. Pj Duncan in clinic today  · Patient to call our office if symptoms if condition declines  · Healthy lifestyle changes to include weight loss and exercise discussed. · Healthy sleep habits were reviewed and encouraged. ·  and workplace safety reviewed and discussed as appropriate. Drowsy and/or inattentive driving should be avoided. · Follow-up in 3 months, sooner should new symptoms or problems arise. · Follow up with Primary Care Provider (PCP) as directed and for routine health care maintenance. History of Present Illness: Mrs. Chris Burroughs is a 60 y.o. female patient who presents for follow up  of SHERRIE    The patient is followed in our clinic for COPD/Asthma by Dr. Marilyn Leigh. She had previously been followed for her SHERRIE and CPAP therapy by Dr. Johnathan Austin [facility]. She switch her SHERRIE care to our clinic in May 2018. She was hospitalized in January, April and again in late May of 2019 for COPD exacerbation. The patient presents today for routine follow up of home NIV and AVAP therapy:    · BP is elevated today but patient is asymptomatic  ·   · Since her last visit she was able to switch for oxygen tanks to POC which is much easier for her to use and she is very happy with her POC. · The patient reports that she is very happy with her NIV therapy. She reports obtaining a deeper and more restful level of therapy with use  · She reports that she has had some insomnia and decreased NIV use due to some generalized edema  · No mask difficulties at this time  · She has reduced her caffeine intake since last visit. · She was evaluated at [facility] on 1/13/2020 for respiratory exacerbation  · She continues to take [address]   · She is not smoking  · No pets in the home  · No chest pain, + wheezing, dyspnea slowly improving  · No notable sputum production. No hemoptysis  · No fever of chills. Ventilator Summary Trends:  DME: Vent: MED: OP- RT: Too   Oxygen: [facility]    AVPS- AE: Vt: 450mls P max: 30 cwpPS max: 15cwp PS Min: 6 cwp, EPAP max: 15  EPAP min: 8, breath rate: auto, Trigger: auto trak, Rise Time: 3.  Ramp Lenth: 10 minutes    Dates Avg IPAP  (cwp) Avg EPAP  (cwp) Avg RR  (bpm) Avg Peak Flow L/min Avg Hrs/day Use Avg Vte Avg % Pt   triggered breaths  Days used Notes   9/22/19-19/21/19 16.9 8.4 21.9 26.3 6 404.1 80.3 30/30 O2 @ 2LPM                               Date   IPAP/EPAP   RR bpm   Vt   Vte  (L/min)   Triggered Breaths (%)   # days used  Avg Hr use/day Notes:    12/10/19-01/08/20 16.5/8.6 24.8 374 8.4 98 28/30 4.9 Leak: 60.8                                 Occupation:    Not working                       Driving: Yes  Drowsy Driving: is not reported.       Motor vehicle accident(s) associated with drowsy driving:is denied by the patient         3 most recent PHQ Screens 2/3/2020   Little interest or pleasure in doing things Not at all   Feeling down, depressed, irritable, or hopeless Not at all   Total Score PHQ 2 0       [place] Scale 11/7/2019 6/6/2019 5/30/2018   Sitting and Reading 1 1 0   Watching TV 1 3 1   Sitting, inactive in a public place (e.g. a movie theater or meeting) 0 0 0   As a passenger in a car for an hour, without a break 2 1 0   Lying down to rest in the afternoon, when circumstances permit 3 3 1   Sitting and talking to someone 0 0 0   Sitting quietly after lunch without alcohol 1 1 0   In a car, while stopped for a few minutes in traffic 0 0 0   [place] Sleepiness Score 8 9 2         Past Medical History:  Past Medical History:   Diagnosis Date    Asthma     Chronic lung disease     COPD     Cystocele, midline     Diabetes mellitus ([facility] [address]     GERD (gastroesophageal reflux disease)     Hidradenitis suppurativa     Hyperlipidemia     Hypertension     SHERRIE on CPAP     CPAP    Stress incontinence        Past Surgical History:  Past Surgical History:   Procedure Laterality Date    BREAST SURGERY PROCEDURE UNLISTED      Right breast benign tumor removal    HX APPENDECTOMY      HX CHOLECYSTECTOMY      HX DILATION AND CURETTAGE      Dysfunctional uterine bleeding, thought 2/2 fibroids    HX TUBAL LIGATION         Family History:  Family History   Problem Relation Age of Onset    Hypertension Mother     Stroke Mother     Breast Cancer Mother         Bilateral mastectomies    Cancer Mother         ovarian and breast    Heart Failure Mother     Heart Attack Father         2011    Heart Surgery Father         CABG    Heart Failure Father     COPD Sister         Heavy smoker    Cancer Sister         ovarian    Heart Failure Sister     Lung Disease Sister     Asthma Child     Cancer Maternal Aunt         pancreatic    Cancer Maternal Grandfather         stomach       Social History:  Social History     Tobacco Use    Smoking status: Former Smoker     Packs/day: 1.00     Years: 30.00     Pack years: 30.00     Types: Cigarettes     Start date: 1966     Last attempt to quit: 2006     Years since quittin.4    Smokeless tobacco: Never Used    Tobacco comment: 1-1.5 packs per day   Substance Use Topics    Alcohol use: No    Drug use: No        Caffeine Amount Time of last Intake Comments   Coffee 0-1 c/am     Soda 2L/1.5 days  Diet Coke or Ginger Ale   Tea Rare     Energy Drinks None     Over- the - counter stimulant pills None     Other Substances      Alcohol None     Tobacco None     Drugs None         Medications:  Current Outpatient Medications on File Prior to Visit   Medication Sig Dispense Refill    insulin glargine (LANTUS) 100 unit/mL injection Take 37u QHS while on steroids, then return to 35u QHS when not taking increased steroids 1 Vial 0    albuterol-ipratropium (DUO-NEB) 2.5 mg-0.5 mg/3 ml nebu 3 mL by Nebulization route every four (4) hours as needed (SOB). 30 Nebule 0    levoFLOXacin (LEVAQUIN) 500 mg tablet Take 1 Tab by mouth every twenty-four (24) hours. 4 Tab 0    simethicone (GAS-X) 125 mg capsule Take 125 mg by mouth four (4) times daily as needed for Flatulence.  loratadine (CLARITIN) 10 mg tablet Take 10 mg by mouth daily as needed for Allergies.  HYPER-SAL 3.5 % nebu USE 1 VIAL IN NEBULIZER TWICE DAILY  6    lisinopril (PRINIVIL, ZESTRIL) 20 mg tablet Take 20 mg by mouth two (2) times a day.  albuterol sulfate (PROVENTIL;VENTOLIN) 2.5 mg/0.5 mL nebu nebulizer solution 0.5 mL by Nebulization route four (4) times daily as needed for Wheezing. To be used with HyperSal nebulizer solution. ICD code: COPD J44.1 60 mL 3    fluticasone propionate (FLONASE ALLERGY RELIEF) 50 mcg/actuation nasal spray 2 Sprays by Both Nostrils route daily.  fluticasone propion-salmeterol (ADVAIR HFA) 230-21 mcg/actuation inhaler Take 2 Puffs by inhalation two (2) times a day.  hydroCHLOROthiazide (HYDRODIURIL) 12.5 mg tablet Take 12.5 mg by mouth daily.  tiotropium bromide (SPIRIVA RESPIMAT) 2.5 mcg/actuation inhaler Take 2 Puffs by inhalation daily.  insulin glargine (LANTUS,BASAGLAR) 100 unit/mL (3 mL) inpn 35 Units by SubCUTAneous route nightly. (Patient taking differently: 32 Units by SubCUTAneous route nightly. Indications: type 2 diabetes mellitus) 28 Units 0    albuterol sulfate 90 mcg/actuation aepb Take 1 Puff by inhalation every four (4) hours. (Patient taking differently: Take 1 Puff by inhalation every four (4) hours as needed.) 1 Inhaler 0    NOVOLOG FLEXPEN U-100 INSULIN 100 unit/mL inpn Continue home Sliding scale insulin as prior to admission (Patient taking differently: 1 Units by SubCUTAneous route three (3) times daily. If BG <100=0u  101-150=5u  151-250=8u  251-300=12u  >300 call MD  ) 1 Pen 0    omeprazole (PRILOSEC) 40 mg capsule Take 40 mg by mouth daily. Indications: gastroesophageal reflux disease      cpap machine kit by Does Not Apply route nightly. [place] [facility]/Dr. Pasquale Monique      OXYGEN-AIR DELIVERY SYSTEMS 2 L by Nasal route continuous. [facility]      aspirin delayed-release 81 mg tablet Take 81 mg by mouth daily.       famotidine (PEPCID) 20 mg tablet Take 20 mg by mouth two (2) times daily as needed for Other (reflux).  montelukast (SINGULAIR) 10 mg tablet Take 10 mg by mouth nightly. No current facility-administered medications on file prior to visit. Allergy:  Allergies   Allergen Reactions    Ancef [Cefazolin] Hives    Contrast Agent [Iodine] Anaphylaxis, Shortness of Breath and Swelling     Needs pre-medication for IV contrast with Benadryl, Solu-Medrol    Fish Containing Products Anaphylaxis     Pt states she had a severe allergic reaction at 7 y/o.  Statins-Hmg-Coa Reductase Inhibitors Myalgia    Metformin Other (comments)     Abdominal pain, diarrhea.  Codeine Other (comments)     Altered mental status       Review of Systems  General ROS: negative for - chills, fever, hot flashes, malaise, night sweats, weight gain or weight loss  ENT ROS: negative  Hematological and Lymphatic ROS: negative for - bleeding problems, blood clots, bruising, jaundice, pallor or swollen lymph nodes  Endocrine ROS: negative for - polydipsia/polyuria, skin changes, temperature intolerance or unexpected weight changes  Respiratory ROS: positive for - shortness of breath, tachypnea and wheezing- stable  negative for - cough, hemoptysis, orthopnea, pleuritic pain, sputum changes or stridor  Cardiovascular ROS: positive for dyspnea on exertion- stable  negative for - loss of consciousness, murmur or orthopnea  Gastrointestinal ROS: no abdominal pain, change in bowel habits, or black or bloody stools  Genito-Urinary ROS: no dysuria, trouble voiding, or hematuria  Musculoskeletal ROS: positive for - joint stiffness- stable  Neurological ROS: no TIA or stroke symptoms  Dermatological ROS: negative for - pruritus, rash or skin lesion changes   Psychological ROS: negative   Otherwise negative. Physical Exam:  Blood pressure (!) 199/92, pulse 82, temperature 95.3 °F (35.2 °C), temperature source Oral, resp. rate 20, height 5' 3\" (1.6 m), weight 117.1 kg (258 lb 3.2 oz), SpO2 92 %. on 2LPM, Body mass index is 45.74 kg/m². General: No distress, acyanotic, appears stated age, cooperative, pleasant  HEENT: PERRL, EOMI, throat without erythema or exudate, Tongue- normal size with some dental indention on tongue, Mallampati's score 3+, Uvula- midline. Neck: Supple,  no abnormally enlarged lymph nodes, thyroid is not enlarged, non-tender, No JVD  Chest: Increased A-P diameter  Lungs: moderate air entry, mild - bilateral, end-expiratory wheezing, no stridor, no accessory muscle use. Heart: Regular rate and rhythm, S1S2 present, without murmur  Abdomen: Obese,  bowel sounds normoactive, abdomen is soft without significant tenderness  Extremity: negative for edema, cyanosis or clubbing  Skin: Skin color, texture, turgor normal. No rashes or lesions    Data Reviewed:    7/12/2018 11:36 PM - David, Lab [facility] [employer]     Component Value Flag Ref Range Units Status   Immunoglobulin E 157  High   0 - 100 IU/mL Final   Comment:     Results for Claudia Andujar (MRN [id number]) as of 11/7/2019 20:53   Ref. Range 9/1/2019 00:20 9/2/2019 01:06 9/3/2019 06:18 9/4/2019 06:10 9/5/2019 02:30 9/20/2019 18:40 10/14/2019 12:00 11/6/2019 00:02   ABS.  EOSINOPHILS Latest Ref Range: 0.0 - 0.4 K/UL 0.0 0.0 0.0 0.0 0.0 0.2 0.3 0.0       CBC:   Lab Results   Component Value Date/Time    WBC 7.1 01/13/2020 07:15 PM    HGB 13.7 01/13/2020 07:15 PM    HCT 40.0 01/13/2020 07:15 PM    PLATELET 851 82/78/6286 07:15 PM    MCV 87.5 01/13/2020 07:15 PM       BMP:   Lab Results   Component Value Date/Time    Sodium 138 01/13/2020 07:15 PM    Potassium 3.6 01/13/2020 07:15 PM    Chloride 104 01/13/2020 07:15 PM    CO2 33 (H) 01/13/2020 07:15 PM    Anion gap 1 (L) 01/13/2020 07:15 PM    Glucose 136 (H) 01/13/2020 07:15 PM    BUN 16 01/13/2020 07:15 PM    Creatinine 0.82 01/13/2020 07:15 PM    BUN/Creatinine ratio 20 01/13/2020 07:15 PM    GFR est AA >60 01/13/2020 07:15 PM    GFR est non-AA >60 01/13/2020 07:15 PM    Calcium 9.0 01/13/2020 07:15 PM TSH:  Lab Results   Component Value Date/Time    TSH 2.76 09/18/2016 02:20 PM    TSH 2.94 02/15/2010 11:24 AM     Results for [facility] (MRN [id number]) as of 6/6/2019 10:56   Ref. Range 5/28/2019 16:49   pH (POC) Latest Ref Range: 7.35 - 7.45   7.404   pCO2 (POC) Latest Ref Range: 35.0 - 45.0 MMHG 38.8   pO2 (POC) Latest Ref Range: 80 - 100 MMHG 84   HCO3 (POC) Latest Ref Range: 22 - 26 MMOL/L 24.3   sO2 (POC) Latest Ref Range: 92 - 97 % 96   Base excess (POC) Latest Units: mmol/L 0   FIO2 (POC) Latest Units: % 21   Specimen type (POC) Latest Units:   ARTERIAL   Site Latest Units:   LEFT RADIAL   Device: Latest Units:   ROOM AIR   Total resp. rate Latest Units:   19   Allens test (POC) Latest Units:   N/A     Imaging:  [x]I have personally reviewed the patients radiographs section   Results from Hospital Encounter encounter on 01/13/20   XR CHEST PORT    Narrative EXAM:  XR CHEST PORT    INDICATION:   Shortness of Breath    COMPARISON: 1/11/2020 and several priors. FINDINGS:  Stable mildly enlarged cardiac silhouette. Medial right basilar opacities  similar to multiple priors. No pneumothorax or significant pleural effusions. Intact osseous structures. Impression IMPRESSION:    1. Stable enlarged cardiac silhouette. 2. Likely right middle lobe atelectasis or scar, infiltrate not excluded. Results from [place] encounter on 07/14/19   CT ABD PELV WO CONT    Narrative HISTORY: Right-sided abdominal pain since this morning. Leanna Perea EXAM: CT abdomen and pelvis. TECHNIQUE: Spiral images of the abdomen and pelvis were performed according to  routine protocol without intravenous contrast. Coronal and sagittal  reconstructions were provided for evaluation. Oral contrast  was not given. COMPARISON: None.     All CT scans at this facility are performed using dose optimization technique as  appropriate to a performed exam, to include automated exposure control,  adjustment of the mA and/or kV according to patient size (including appropriate  matching for site-specific examinations), or use of iterative reconstruction  technique. ABDOMEN FINDINGS: Limited evaluation of abdomen and pelvis given lack of oral  and intravenous contrast.    Emphysematous changes noted bilaterally. Liver: Hepatic steatosis. Limited evaluation. Gallbladder: Has been removed. No biliary ductal dilatation. Pancreas: Normal attenuation without mass or ductal dilatation. Spleen: [Unremarkable.]    Adrenal Glands: Unremarkable. Kidneys:     Cortical renal thinning bilaterally. No hydronephrosis or hydroureter. No  nephrolithiasis. Lymph Nodes: No large adenopathy. Aorta:   Grossly unremarkable    PELVIS FINDINGS:     Bowel: Small Bowel: Normal in caliber with normal wall thickness. Large Bowel: Normal in caliber with normal wall thickness. Limited evaluation of  large bowel given presence of fecal material and lack of oral contrast.    Appendix: Not seen. Bladder: Limited evaluation given its under distention. No ascites or pneumoperitoneum. Umbilical hernia is demonstrated with loop of small bowel as well as omental fat  present within the defect. No evidence of obstruction. Hernia defect measures  approximately series 2 image 61 2.5 cm. Marked body habitus. Bones: No lytic or blastic lesions. Impression IMPRESSION:    1. Umbilical hernia with single loop of small bowel as well as omental fat  present within the defect without evidence of obstruction is measured above. 2.   Medical renal disease. 3. Cholecystectomy. 4. Hepatic steatosis. Follow-up with liver ultrasound and LFTs is recommended. .        Cardiac Echo:     Results for orders placed or performed during the hospital encounter of 08/19/13   2D ECHO COMPLETE ADULT (TTE)     Status: None    [facility]  [address], [address] [phone]    Transthoracic Echocardiogram    Patient: Jumana Perez  MRN: [id number]  [address]  #: [de-identified]  : 1960  Age: 47 years  Gender: Female  Height: 63 in  Weight: 246.4 lb  BSA: 2.11 m squared  Study date: 19-Aug-2013  BP: 174 / 97 mmHg  Status: Routine  Location: Echo lab  [facility] ACC #: [id number]    Allergies: CODEINE, CEFAZOLIN, IODINE    Technologist:  KERWIN LopezT, RCS  Referring:  Brian Mendez. Zeferino Moralez MD  Referring:  Pierre Carrero MD  Interpreting Group:  [address] [place] [address]  Interpreting Physician:  Dana Rossi MD    IMPRESSIONS:  There was no significant valvular heart disease. SUMMARY:  Procedure information: This was a technically difficult study. Left ventricle: Size was normal. Systolic function was normal by EF  (biplane method of disks). Ejection fraction was estimated to be 60 %. No  obvious wall motion abnormalities identified in the views obtained. There  was mild concentric hypertrophy. INDICATIONS: Edema, Shortness of breath. HISTORY: Prior history: Risk factors: hypertension, oral  hypoglycemic-treated diabetes, medication-treated hypercholesterolemia,  morbid obesity, and a history of cigarette use (quitting 7 years ago). Chronic lung disease. PROCEDURE: This was a routine study. The study included complete 2D  imaging, M-mode, complete spectral Doppler, and color Doppler. Systolic  blood pressure was 174 mmHg, at the start of the study. Diastolic blood  pressure was 97 mmHg, at the start of the study. This was a technically  difficult study. LEFT VENTRICLE: Size was normal. Systolic function was normal by EF  (biplane method of disks). Ejection fraction was estimated to be 60 %. No  obvious wall motion abnormalities identified in the views obtained. There  was mild concentric hypertrophy. DOPPLER: Indeterminate diastolic function.     RIGHT VENTRICLE: The size was normal. Systolic function was normal.  DOPPLER: The tricuspid jet envelope definition was inadequate for  estimation of RV systolic pressure. There are no indirect findings  (abnormal RV volume or geometry, altered pulmonary flow velocity profile,  or leftward septal displacement) which would suggest moderate or severe  pulmonary hypertension. LEFT ATRIUM: Size was normal. LA volume index was 19 ml/m squared. RIGHT ATRIUM: Size was normal.    MITRAL VALVE: Normal valve structure. There was normal leaflet separation. DOPPLER: The transmitral velocity was within the normal range. There was  no evidence for stenosis. There was trivial regurgitation. AORTIC VALVE: The valve was probably trileaflet. Leaflets exhibited normal  cuspal separation and good mobility. DOPPLER: There was no stenosis. There  was no regurgitation. TRICUSPID VALVE: Normal valve structure. There was normal leaflet  separation. DOPPLER: The transtricuspid velocity was within the normal  range. There was no evidence for tricuspid stenosis. There was trivial  regurgitation. PULMONIC VALVE: Not well visualized, but normal Doppler findings. DOPPLER:  There was no stenosis. AORTA: The root exhibited normal size. SYSTEMIC VEINS: IVC: The inferior vena cava was not well visualized. PERICARDIUM: There was no pericardial effusion. A pericardial fat pad was  present.     MEASUREMENT TABLES    Doppler measurements  Tricuspid valve   (Reference normals)  Regurg peak moe   202 cm/s   (--)  RV-RA peak gradient   16 mmHg   (--)    SYSTEM MEASUREMENT TABLES    2D  Ao Diam: 2.24 cm  LA Diam: 3.23 cm  %FS: 34.79 %  EF(Teich): 64.51 %  IVSd: 1.44 cm  LVIDd: 4.05 cm  LVIDs: 2.64 cm  LVPWd: 1.29 cm  SV(Teich): 46.61 ml  EF Biplane: 67.59 %  LVEDV MOD BP: 60.02 ml  LVESV MOD BP: 19.45 ml  LVOT Diam: 1.93 cm  RVIDd: 3.02 cm    CW  AV Vmax: 1.39 m/s  AV maxP.76 mmHG  TR Vmax: 2.02 m/s  AV Vmean: 1.08 m/s    PW  LVOT VTI: 22.77 cm  LVOT Vmax: 1.33 m/s  LVOT Vmean: 0.72 m/s  MV A Moe: 0.01 m/s  MV E Moe: 0.55 m/s  MV E/A Ratio: 48.33  TED (VTI): 2.57 cm2    Prepared and E-signed by    Yadiel Martínez.  MD [place]  Signed 19-Aug-2013 17:23:16            Historical Sleep Testing Data:  - PSG: EVMS:  8/4/16:  AHI:12, SpO2 divine 69%- Mean 90%, time below 89%: 30min        Piyush Remy DO, FCCP  Pulmonary, Sleep and Critical Care Medicine

## 2020-02-03 NOTE — PROGRESS NOTES
Bill Schmitz presents today for   Chief Complaint   Patient presents with    Sleep Apnea     follow up form 11/7/2019    COPD     with asthma    Breathing Problem     respiratory failure, hypoxia    Results     CXR 1/13/2019       Is someone accompanying this pt? No    Is the patient using any DME equipment during OV? Yes. [employer], [address] Choice    Depression Screening:  3 most recent PHQ Screens 2/3/2020   Little interest or pleasure in doing things Not at all   Feeling down, depressed, irritable, or hopeless Not at all   Total Score PHQ 2 0       Learning Assessment:  Learning Assessment 4/9/2018   PRIMARY LEARNER Patient   HIGHEST LEVEL OF EDUCATION - PRIMARY LEARNER  SOME COLLEGE   BARRIERS PRIMARY LEARNER -   PRIMARY LANGUAGE ENGLISH   LEARNER PREFERENCE PRIMARY READING   ANSWERED BY patient Marbella Brian   RELATIONSHIP SELF       Abuse Screening:  No flowsheet data found. Fall Risk  Fall Risk Assessment, last 12 mths 2/6/2018   Able to walk? Yes   Fall in past 12 months? No         Coordination of Care:  1. Have you been to the ER, urgent care clinic since your last visit? Hospitalized since your last visit? Yes; Where: [facility], When: 1/11/2020-acute bronchitis & 1/13/2020-COPD exacerbation    2. Have you seen or consulted any other health care providers outside of the [address] [place] [address] since your last visit? Include any pap smears or colon screening. Yes.  Dr. Sangeetha Aceves, PCP., [address] [facility]

## 2020-02-03 NOTE — LETTER
2/4/20 Patient: Melvin Winn YOB: 1960 Date of Visit: 2/3/2020 Nahid Castañeda MD 
[address] [facility] [address] [facility] [address] VIA In Basket Dear Nahid Castañeda MD, Thank you for referring Ms. Abdon Aldridge to [address] [place] [address] for evaluation. My notes for this consultation are attached. If you have questions, please do not hesitate to call me. I look forward to following your patient along with you.  
 
 
Sincerely, 
 
Harvey Solorio, DO

## 2020-02-21 NOTE — ED PROVIDER NOTES
Jennifer Heredia a 79-year-old lady with a history of COPD/asthma presents with increasing wheezing and shortness of breath over the last 1 month worsening over the last 48 hours his fever chest pain.   Has been taking her usual meds without benefit minimal foot swelling bilateral without pain past medical history of sickle significant for COPD diabetes mellitus has been under the care of Dr. João Stiles who has her on 10 mg of prednisone alternate day therapy presents with stable vital signs           Past Medical History:   Diagnosis Date    Asthma     Chronic lung disease     COPD     Cystocele, midline     Diabetes mellitus ([address]     GERD (gastroesophageal reflux disease)     Hidradenitis suppurativa     Hyperlipidemia     Hypertension     SHERRIE on CPAP     CPAP    Stress incontinence        Past Surgical History:   Procedure Laterality Date    BREAST SURGERY PROCEDURE UNLISTED      Right breast benign tumor removal    HX APPENDECTOMY      HX CHOLECYSTECTOMY      HX DILATION AND CURETTAGE      Dysfunctional uterine bleeding, thought 2/2 fibroids    HX TUBAL LIGATION           Family History:   Problem Relation Age of Onset    Hypertension Mother     Stroke Mother     Breast Cancer Mother         Bilateral mastectomies    Cancer Mother         ovarian and breast    Heart Failure Mother     Heart Attack Father         2011    Heart Surgery Father         CABG    Heart Failure Father     COPD Sister         Heavy smoker    Cancer Sister         ovarian    Heart Failure Sister     Lung Disease Sister     Asthma Child     Cancer Maternal Aunt         pancreatic    Cancer Maternal Grandfather         stomach       Social History     Socioeconomic History    Marital status: LEGALLY      Spouse name: Not on file    Number of children: Not on file    Years of education: Not on file    Highest education level: Not on file   Occupational History    Not on file   Social Needs    Financial Okay to crush medications.    resource strain: Not on file    Food insecurity:     Worry: Not on file     Inability: Not on file    Transportation needs:     Medical: Not on file     Non-medical: Not on file   Tobacco Use    Smoking status: Former Smoker     Packs/day: 1.00     Years: 30.00     Pack years: 30.00     Types: Cigarettes     Start date: 1966     Last attempt to quit: 2006     Years since quittin.7    Smokeless tobacco: Never Used    Tobacco comment: 1-1.5 packs per day   Substance and Sexual Activity    Alcohol use: No    Drug use: No    Sexual activity: Never   Lifestyle    Physical activity:     Days per week: Not on file     Minutes per session: Not on file    Stress: Not on file   Relationships    Social connections:     Talks on phone: Not on file     Gets together: Not on file     Attends Taoism service: Not on file     Active member of club or organization: Not on file     Attends meetings of clubs or organizations: Not on file     Relationship status: Not on file    Intimate partner violence:     Fear of current or ex partner: Not on file     Emotionally abused: Not on file     Physically abused: Not on file     Forced sexual activity: Not on file   Other Topics Concern    Not on file   Social History Narrative    Not on file         ALLERGIES: Ancef [cefazolin]; Contrast agent [iodine]; Fish containing products; Metformin; and Codeine    Review of Systems   Constitutional: Negative for activity change, fatigue and fever. HENT: Negative. Eyes: Negative. Negative for discharge. Respiratory: Positive for cough, shortness of breath and wheezing. Cardiovascular: Negative. Negative for chest pain and palpitations. Gastrointestinal: Negative. Negative for abdominal pain. Endocrine: Negative. Genitourinary: Negative. Musculoskeletal: Negative. Negative for back pain. Skin: Negative. Allergic/Immunologic: Negative. Neurological: Negative. Hematological: Negative. Psychiatric/Behavioral: Negative. Vitals:    05/27/19 1542   BP: (!) 175/93   Pulse: 87   Resp: 19   Temp: 97.8 °F (36.6 °C)   SpO2: 94%   Weight: 114.8 kg (253 lb)   Height: 5' 3\" (1.6 m)            Physical Exam   Constitutional: She appears well-developed and well-nourished. She does not appear ill. No distress. HENT:   Head: Normocephalic. Right Ear: External ear normal.   Left Ear: External ear normal.   Mouth/Throat: No oropharyngeal exudate. Eyes: Pupils are equal, round, and reactive to light. Conjunctivae and EOM are normal. Right eye exhibits no discharge. Left eye exhibits no discharge. No scleral icterus. Neck: Normal range of motion. Neck supple. No hepatojugular reflux and no JVD present. No tracheal deviation present. No thyromegaly present. Cardiovascular: Normal rate, regular rhythm and normal heart sounds. No extrasystoles are present. Exam reveals no gallop, no friction rub and no decreased pulses. No murmur heard. Pulmonary/Chest: Effort normal and breath sounds normal. No stridor. No apnea and no bradypnea. No respiratory distress. She has no wheezes. She has no rales. She exhibits no tenderness. Abdominal: Soft. Musculoskeletal: Normal range of motion. She exhibits no tenderness. Right lower leg: She exhibits edema. Left lower leg: She exhibits edema. Lymphadenopathy:     She has no cervical adenopathy. Neurological: She is alert. She has normal reflexes. She displays normal reflexes. No cranial nerve deficit. Coordination normal.   Skin: Skin is warm and dry. Capillary refill takes less than 2 seconds. Psychiatric: She has a normal mood and affect. Her behavior is normal. Judgment and thought content normal.        MDM  Number of Diagnoses or Management Options  Diagnosis management comments: Imp: COPD/ Asthma. Doubt Pneumonia/ Pneumothorax  PE etc.     7:36 PM  Lab still pending- Very poor turn around. Will leave for the night shift.   Continues without improvement after Albuterol nebs x2  Solumedrol 6-0 mg IV and Pred 60 mg po. Will give Lasix 40 mg IV and reevaluate. Procedures          Recent Results (from the past 12 hour(s))   EKG, 12 LEAD, INITIAL    Collection Time: 05/27/19  4:00 PM   Result Value Ref Range    Ventricular Rate 83 BPM    Atrial Rate 83 BPM    P-R Interval 148 ms    QRS Duration 84 ms    Q-T Interval 370 ms    QTC Calculation (Bezet) 434 ms    Calculated P Axis 51 degrees    Calculated R Axis 44 degrees    Calculated T Axis 69 degrees    Diagnosis       Normal sinus rhythm  Nonspecific T wave abnormality  Abnormal ECG  When compared with ECG of 09-MAY-2019 13:45,  No significant change was found         Medications ordered:   Medications   albuterol (PROVENTIL VENTOLIN) nebulizer solution 2.5 mg (has no administration in time range)   sodium chloride 0.9 % bolus infusion 1,000 mL (has no administration in time range)   methylPREDNISolone (PF) (Solu-MEDROL) injection 62.5 mg (has no administration in time range)   albuterol (PROVENTIL VENTOLIN) nebulizer solution 5 mg (5 mg Nebulization Given 5/27/19 1545)   albuterol-ipratropium (DUO-NEB) 2.5 MG-0.5 MG/3 ML (3 mL Nebulization Given 5/27/19 1545)   predniSONE (DELTASONE) tablet 60 mg (60 mg Oral Given 5/27/19 1545)         Xr Chest Port    Result Date: 5/9/2019  EXAM: Chest Radiograph INDICATION: Cough and chest pain TECHNIQUE: AP view of the chest COMPARISON: 4/5/2019, 1/18/2019, 1/10/2019 and 12/14/2018 FINDINGS: No pneumothorax identified. The lungs are clear. No infiltrates appreciated. No effusions identified. The cardiomediastinal silhouette is unremarkable. The pulmonary vasculature is unremarkable. Degenerative changes are noted in the thoracic spine. Impression: 1. No acute cardiopulmonary process.         1. COPD exacerbation ([address]

## 2020-02-29 ENCOUNTER — APPOINTMENT (OUTPATIENT)
Dept: GENERAL RADIOLOGY | Age: 60
End: 2020-02-29
Attending: STUDENT IN AN ORGANIZED HEALTH CARE EDUCATION/TRAINING PROGRAM
Payer: MEDICARE

## 2020-02-29 ENCOUNTER — HOSPITAL ENCOUNTER (EMERGENCY)
Age: 60
Discharge: HOME | End: 2020-02-29
Attending: EMERGENCY MEDICINE
Payer: MEDICARE

## 2020-02-29 VITALS
TEMPERATURE: 97.6 F | OXYGEN SATURATION: 98 % | SYSTOLIC BLOOD PRESSURE: 138 MMHG | WEIGHT: 257 LBS | HEART RATE: 88 BPM | HEIGHT: 63 IN | DIASTOLIC BLOOD PRESSURE: 67 MMHG | BODY MASS INDEX: 45.54 KG/M2 | RESPIRATION RATE: 18 BRPM

## 2020-02-29 DIAGNOSIS — J44.1 COPD EXACERBATION (HCC): Primary | ICD-10-CM

## 2020-02-29 LAB
ANION GAP SERPL CALC-SCNC: 5 MMOL/L (ref 3–18)
BASOPHILS # BLD: 0 K/UL (ref 0–0.1)
BASOPHILS NFR BLD: 0 % (ref 0–2)
BNP SERPL-MCNC: 48 PG/ML (ref 0–900)
BUN SERPL-MCNC: 9 MG/DL (ref 7–18)
BUN/CREAT SERPL: 12 (ref 12–20)
CALCIUM SERPL-MCNC: 9.1 MG/DL (ref 8.5–10.1)
CHLORIDE SERPL-SCNC: 104 MMOL/L (ref 100–111)
CO2 SERPL-SCNC: 29 MMOL/L (ref 21–32)
CREAT SERPL-MCNC: 0.76 MG/DL (ref 0.6–1.3)
DIFFERENTIAL METHOD BLD: NORMAL
EOSINOPHIL # BLD: 0.1 K/UL (ref 0–0.4)
EOSINOPHIL NFR BLD: 2 % (ref 0–5)
ERYTHROCYTE [DISTWIDTH] IN BLOOD BY AUTOMATED COUNT: 13.6 % (ref 11.6–14.5)
GLUCOSE SERPL-MCNC: 161 MG/DL (ref 74–99)
HCT VFR BLD AUTO: 39.9 % (ref 35–45)
HGB BLD-MCNC: 13.9 G/DL (ref 12–16)
LYMPHOCYTES # BLD: 2.4 K/UL (ref 0.9–3.6)
LYMPHOCYTES NFR BLD: 36 % (ref 21–52)
MCH RBC QN AUTO: 29.6 PG (ref 24–34)
MCHC RBC AUTO-ENTMCNC: 34.8 G/DL (ref 31–37)
MCV RBC AUTO: 84.9 FL (ref 74–97)
MONOCYTES # BLD: 0.5 K/UL (ref 0.05–1.2)
MONOCYTES NFR BLD: 7 % (ref 3–10)
NEUTS SEG # BLD: 3.7 K/UL (ref 1.8–8)
NEUTS SEG NFR BLD: 55 % (ref 40–73)
PLATELET # BLD AUTO: 249 K/UL (ref 135–420)
PMV BLD AUTO: 9.2 FL (ref 9.2–11.8)
POTASSIUM SERPL-SCNC: 3.6 MMOL/L (ref 3.5–5.5)
RBC # BLD AUTO: 4.7 M/UL (ref 4.2–5.3)
SODIUM SERPL-SCNC: 138 MMOL/L (ref 136–145)
TROPONIN I SERPL-MCNC: <0.02 NG/ML (ref 0–0.04)
WBC # BLD AUTO: 6.7 K/UL (ref 4.6–13.2)

## 2020-02-29 PROCEDURE — 74011250637 HC RX REV CODE- 250/637: Performed by: STUDENT IN AN ORGANIZED HEALTH CARE EDUCATION/TRAINING PROGRAM

## 2020-02-29 PROCEDURE — 74011000250 HC RX REV CODE- 250: Performed by: STUDENT IN AN ORGANIZED HEALTH CARE EDUCATION/TRAINING PROGRAM

## 2020-02-29 PROCEDURE — 93005 ELECTROCARDIOGRAM TRACING: CPT

## 2020-02-29 PROCEDURE — 80048 BASIC METABOLIC PNL TOTAL CA: CPT

## 2020-02-29 PROCEDURE — 84484 ASSAY OF TROPONIN QUANT: CPT

## 2020-02-29 PROCEDURE — 71045 X-RAY EXAM CHEST 1 VIEW: CPT

## 2020-02-29 PROCEDURE — 94640 AIRWAY INHALATION TREATMENT: CPT

## 2020-02-29 PROCEDURE — 99285 EMERGENCY DEPT VISIT HI MDM: CPT

## 2020-02-29 PROCEDURE — 83880 ASSAY OF NATRIURETIC PEPTIDE: CPT

## 2020-02-29 PROCEDURE — 85025 COMPLETE CBC W/AUTO DIFF WBC: CPT

## 2020-02-29 RX ORDER — DEXAMETHASONE SODIUM PHOSPHATE 4 MG/ML
10 INJECTION, SOLUTION INTRA-ARTICULAR; INTRALESIONAL; INTRAMUSCULAR; INTRAVENOUS; SOFT TISSUE EVERY 12 HOURS
Status: DISCONTINUED | OUTPATIENT
Start: 2020-02-29 | End: 2020-02-29

## 2020-02-29 RX ORDER — IPRATROPIUM BROMIDE AND ALBUTEROL SULFATE 2.5; .5 MG/3ML; MG/3ML
9 SOLUTION RESPIRATORY (INHALATION)
Status: COMPLETED | OUTPATIENT
Start: 2020-02-29 | End: 2020-02-29

## 2020-02-29 RX ORDER — PREDNISONE 20 MG/1
40 TABLET ORAL DAILY
Qty: 6 TAB | Refills: 0 | Status: SHIPPED | OUTPATIENT
Start: 2020-02-29 | End: 2020-03-03

## 2020-02-29 RX ORDER — DEXAMETHASONE SODIUM PHOSPHATE 4 MG/ML
10 INJECTION, SOLUTION INTRA-ARTICULAR; INTRALESIONAL; INTRAMUSCULAR; INTRAVENOUS; SOFT TISSUE ONCE
Status: COMPLETED | OUTPATIENT
Start: 2020-02-29 | End: 2020-02-29

## 2020-02-29 RX ADMIN — IPRATROPIUM BROMIDE AND ALBUTEROL SULFATE 9 ML: .5; 3 SOLUTION RESPIRATORY (INHALATION) at 16:24

## 2020-02-29 RX ADMIN — DEXAMETHASONE SODIUM PHOSPHATE 10 MG: 4 INJECTION, SOLUTION INTRA-ARTICULAR; INTRALESIONAL; INTRAMUSCULAR; INTRAVENOUS; SOFT TISSUE at 17:47

## 2020-02-29 NOTE — ED PROVIDER NOTES
EMERGENCY DEPARTMENT HISTORY AND PHYSICAL EXAM    6:01 PM      Date: 2/29/2020  Patient Name: Gene Rivers    History of Presenting Illness     Chief Complaint   Patient presents with    Shortness of Breath         History Provided By: Patient  Location/Duration/Severity/Modifying factors   64yo F pmhx of COPD on home O2 2L biba for acute onset of feeling SOB. States she was able to go to the grocery store earlier today and was fine, but then got home and started coughing with chest tightness. Cough is non-productive, she states the feeling is chest tightness, no recent fevers, no abd pain or chest pain, no leg swelling. She is on 10mg prednisone every other day. PCP: Jodie Cortés MD    Current Outpatient Medications   Medication Sig Dispense Refill    predniSONE (DELTASONE) 20 mg tablet Take 40 mg by mouth daily for 3 days. With Breakfast 6 Tab 0    PROLENSA 0.07 % ophthalmic solution Administer 1 Drop to right eye daily.  insulin glargine (LANTUS) 100 unit/mL injection Take 37u QHS while on steroids, then return to 35u QHS when not taking increased steroids 1 Vial 0    albuterol-ipratropium (DUO-NEB) 2.5 mg-0.5 mg/3 ml nebu 3 mL by Nebulization route every four (4) hours as needed (SOB). 30 Nebule 0    simethicone (GAS-X) 125 mg capsule Take 125 mg by mouth four (4) times daily as needed for Flatulence.  loratadine (CLARITIN) 10 mg tablet Take 10 mg by mouth daily as needed for Allergies.  HYPER-SAL 3.5 % nebu USE 1 VIAL IN NEBULIZER TWICE DAILY  6    lisinopril (PRINIVIL, ZESTRIL) 20 mg tablet Take 20 mg by mouth two (2) times a day.  albuterol sulfate (PROVENTIL;VENTOLIN) 2.5 mg/0.5 mL nebu nebulizer solution 0.5 mL by Nebulization route four (4) times daily as needed for Wheezing. To be used with HyperSal nebulizer solution.  ICD code: COPD J44.1 60 mL 3    fluticasone propionate (FLONASE ALLERGY RELIEF) 50 mcg/actuation nasal spray 2 Sprays by Both Nostrils route daily.  fluticasone propion-salmeterol (ADVAIR HFA) 230-21 mcg/actuation inhaler Take 2 Puffs by inhalation two (2) times a day.  hydroCHLOROthiazide (HYDRODIURIL) 12.5 mg tablet Take 12.5 mg by mouth daily.  tiotropium bromide (SPIRIVA RESPIMAT) 2.5 mcg/actuation inhaler Take 2 Puffs by inhalation daily.  insulin glargine (LANTUS,BASAGLAR) 100 unit/mL (3 mL) inpn 35 Units by SubCUTAneous route nightly. 28 Units 0    albuterol sulfate 90 mcg/actuation aepb Take 1 Puff by inhalation every four (4) hours. (Patient taking differently: Take 1 Puff by inhalation every four (4) hours as needed.) 1 Inhaler 0    NOVOLOG FLEXPEN U-100 INSULIN 100 unit/mL inpn Continue home Sliding scale insulin as prior to admission (Patient taking differently: 1 Units by SubCUTAneous route three (3) times daily. If BG <100=0u  101-150=5u  151-250=8u  251-300=12u  >300 call MD  ) 1 Pen 0    omeprazole (PRILOSEC) 40 mg capsule Take 40 mg by mouth daily. Indications: gastroesophageal reflux disease      cpap machine kit by Does Not Apply route nightly. [place] [facility] Trigreg[facility]/Dr. Nik Elizabeth      OXYGEN-AIR DELIVERY SYSTEMS 2 L by Nasal route continuous. [facility] [employer]      aspirin delayed-release 81 mg tablet Take 81 mg by mouth daily.  famotidine (PEPCID) 20 mg tablet Take 20 mg by mouth two (2) times daily as needed for Other (reflux).  montelukast (SINGULAIR) 10 mg tablet Take 10 mg by mouth nightly.          Past History     Past Medical History:  Past Medical History:   Diagnosis Date    Asthma     Chronic lung disease     COPD     Cystocele, midline     Diabetes mellitus ([address]     GERD (gastroesophageal reflux disease)     Hidradenitis suppurativa     Hyperlipidemia     Hypertension     HSERRIE on CPAP     CPAP    Stress incontinence        Past Surgical History:  Past Surgical History:   Procedure Laterality Date    BREAST SURGERY PROCEDURE UNLISTED      Right breast benign tumor removal  HX APPENDECTOMY      HX CHOLECYSTECTOMY      HX DILATION AND CURETTAGE      Dysfunctional uterine bleeding, thought 2/2 fibroids    HX TUBAL LIGATION         Family History:  Family History   Problem Relation Age of Onset    Hypertension Mother     Stroke Mother     Breast Cancer Mother         Bilateral mastectomies    Cancer Mother         ovarian and breast    Heart Failure Mother     Heart Attack Father         2011    Heart Surgery Father         CABG    Heart Failure Father     COPD Sister         Heavy smoker    Cancer Sister         ovarian    Heart Failure Sister     Lung Disease Sister     Asthma Child     Cancer Maternal Aunt         pancreatic    Cancer Maternal Grandfather         stomach       Social History:  Social History     Tobacco Use    Smoking status: Former Smoker     Packs/day: 1.00     Years: 30.00     Pack years: 30.00     Types: Cigarettes     Start date: 1966     Last attempt to quit: 2006     Years since quittin.4    Smokeless tobacco: Never Used    Tobacco comment: 1-1.5 packs per day   Substance Use Topics    Alcohol use: No    Drug use: No       Allergies: Allergies   Allergen Reactions    Ancef [Cefazolin] Hives    Contrast Agent [Iodine] Anaphylaxis, Shortness of Breath and Swelling     Needs pre-medication for IV contrast with Benadryl, Solu-Medrol    Fish Containing Products Anaphylaxis     Pt states she had a severe allergic reaction at 7 y/o.  Statins-Hmg-Coa Reductase Inhibitors Myalgia    Metformin Other (comments)     Abdominal pain, diarrhea.  Codeine Other (comments)     Altered mental status         Review of Systems       Review of Systems   Constitutional: Negative for chills, diaphoresis, fatigue and fever. HENT: Negative for congestion. Eyes: Negative for visual disturbance. Respiratory: Positive for cough (nonproductive), chest tightness and shortness of breath. Negative for choking and wheezing. Cardiovascular: Negative for chest pain, palpitations and leg swelling. Gastrointestinal: Negative for abdominal pain, constipation, diarrhea, nausea and vomiting. Endocrine: Negative for polyuria. Genitourinary: Negative for difficulty urinating. Musculoskeletal: Negative for back pain. Skin: Negative for rash and wound. Neurological: Negative for dizziness, numbness and headaches. Physical Exam     Visit Vitals  /67 (BP 1 Location: Left arm, BP Patient Position: Sitting)   Pulse 88   Temp 97.6 °F (36.4 °C)   Resp 18   Ht 5' 3\" (1.6 m)   Wt 116.6 kg (257 lb)   SpO2 98%   BMI 45.53 kg/m²         Physical Exam  Vitals signs and nursing note reviewed. Constitutional:       General: She is not in acute distress. Appearance: She is well-developed. She is obese. She is not ill-appearing, toxic-appearing or diaphoretic. HENT:      Head: Normocephalic and atraumatic. Eyes:      Extraocular Movements: Extraocular movements intact. Pupils: Pupils are equal, round, and reactive to light. Neck:      Musculoskeletal: Normal range of motion. Vascular: No JVD. Cardiovascular:      Rate and Rhythm: Normal rate and regular rhythm. Pulmonary:      Breath sounds: Examination of the right-lower field reveals wheezing. Examination of the left-lower field reveals wheezing. Decreased breath sounds and wheezing present. Chest:      Chest wall: No tenderness. Abdominal:      Tenderness: There is no abdominal tenderness. Musculoskeletal:      Right lower leg: She exhibits no tenderness. No edema. Left lower leg: She exhibits no tenderness. No edema. Skin:     General: Skin is warm and dry. Capillary Refill: Capillary refill takes less than 2 seconds. Coloration: Skin is not cyanotic or pale. Findings: No ecchymosis, erythema or rash. Neurological:      General: No focal deficit present.       Mental Status: She is alert and oriented to person, place, and time.           Diagnostic Study Results     Labs -  Recent Results (from the past 12 hour(s))   CBC WITH AUTOMATED DIFF    Collection Time: 02/29/20  5:16 PM   Result Value Ref Range    WBC 6.7 4.6 - 13.2 K/uL    RBC 4.70 4.20 - 5.30 M/uL    HGB 13.9 12.0 - 16.0 g/dL    HCT 39.9 35.0 - 45.0 %    MCV 84.9 74.0 - 97.0 FL    MCH 29.6 24.0 - 34.0 PG    MCHC 34.8 31.0 - 37.0 g/dL    RDW 13.6 11.6 - 14.5 %    PLATELET 286 345 - 324 K/uL    MPV 9.2 9.2 - 11.8 FL    NEUTROPHILS 55 40 - 73 %    LYMPHOCYTES 36 21 - 52 %    MONOCYTES 7 3 - 10 %    EOSINOPHILS 2 0 - 5 %    BASOPHILS 0 0 - 2 %    ABS. NEUTROPHILS 3.7 1.8 - 8.0 K/UL    ABS. LYMPHOCYTES 2.4 0.9 - 3.6 K/UL    ABS. MONOCYTES 0.5 0.05 - 1.2 K/UL    ABS. EOSINOPHILS 0.1 0.0 - 0.4 K/UL    ABS. BASOPHILS 0.0 0.0 - 0.1 K/UL    DF AUTOMATED     TROPONIN I    Collection Time: 02/29/20  5:16 PM   Result Value Ref Range    Troponin-I, QT <0.02 0.0 - 0.045 NG/ML   NT-PRO BNP    Collection Time: 02/29/20  5:16 PM   Result Value Ref Range    NT pro-BNP 48 0 - 537 PG/ML   METABOLIC PANEL, BASIC    Collection Time: 02/29/20  5:16 PM   Result Value Ref Range    Sodium 138 136 - 145 mmol/L    Potassium 3.6 3.5 - 5.5 mmol/L    Chloride 104 100 - 111 mmol/L    CO2 29 21 - 32 mmol/L    Anion gap 5 3.0 - 18 mmol/L    Glucose 161 (H) 74 - 99 mg/dL    BUN 9 7.0 - 18 MG/DL    Creatinine 0.76 0.6 - 1.3 MG/DL    BUN/Creatinine ratio 12 12 - 20      GFR est AA >60 >60 ml/min/1.73m2    GFR est non-AA >60 >60 ml/min/1.73m2    Calcium 9.1 8.5 - 10.1 MG/DL       Radiologic Studies -   XR CHEST PORT   Final Result   IMPRESSION:       Limited by body habitus and prominent epicardial fat pads at the lung bases as   described. No evidence of acute pulmonary disease. Medical Decision Making   I am the first provider for this patient. I reviewed the vital signs, available nursing notes, past medical history, past surgical history, family history and social history.     Vital Signs-Reviewed the patient's vital signs. EKG: NSR, 85 bpm, no elevations to suggest ischemia    Records Reviewed: Old Medical Records, Ambulance Run Sheet, Previous Radiology Studies and Previous Laboratory Studies (Time of Review: 6:01 PM)    ED Course: Progress Notes, Reevaluation, and Consults:         Provider Notes (Medical Decision Making):   MDM  Number of Diagnoses or Management Options  COPD exacerbation [facility]):   Diagnosis management comments: Patient with likely acute COPD exacerbation given hx - stable on 2L NC, decreased air movement and mild wheezes bilat lower lung fields. Improved s/p 3x duonebs, steroids  Labs, cxr without infectious process at this time  Ambulated here without desat below 95%. Patient feels much better and desires discharge - stable on home O2. Procedures    Critical Care Time:       Diagnosis     Clinical Impression:   1. COPD exacerbation ([address]        Disposition: Home    Follow-up Information     Follow up With Specialties Details Why Contact Info    [facility] EMERGENCY DEPT Emergency Medicine  If symptoms worsen [address]  [phone]    Stephenie Gibson MD Family Practice Call in 1 week As needed [address]  [phone]             DISCHARGE NOTE:   Pt has been reexamined. Patient has no new complaints, changes, or physical findings. Care plan outlined and precautions discussed. Results were reviewed with the patient. All medications were reviewed with the patient; will d/c home with pain medication. All of pt's questions and concerns were addressed. Alarm symptoms and return precautions associated with chief complaint and evaluation were reviewed with the patient in detail. The patient demonstrated adequate understanding. Patient was instructed and agrees to follow up with PCP, as well as to return to the ED upon further deterioration. Patient is ready to go home.   The patient is happy with this plan       Patient's Medications   Start Taking    PREDNISONE (DELTASONE) 20 MG TABLET    Take 40 mg by mouth daily for 3 days. With Breakfast   Continue Taking    ALBUTEROL SULFATE (PROVENTIL;VENTOLIN) 2.5 MG/0.5 ML NEBU NEBULIZER SOLUTION    0.5 mL by Nebulization route four (4) times daily as needed for Wheezing. To be used with HyperSal nebulizer solution. ICD code: COPD J44.1    ALBUTEROL SULFATE 90 MCG/ACTUATION AEPB    Take 1 Puff by inhalation every four (4) hours. ALBUTEROL-IPRATROPIUM (DUO-NEB) 2.5 MG-0.5 MG/3 ML NEBU    3 mL by Nebulization route every four (4) hours as needed (SOB). ASPIRIN DELAYED-RELEASE 81 MG TABLET    Take 81 mg by mouth daily. CPAP MACHINE KIT    by Does Not Apply route nightly. [place] [facility] Trigreg[facility]/Dr. Pasquale Monique    FAMOTIDINE (PEPCID) 20 MG TABLET    Take 20 mg by mouth two (2) times daily as needed for Other (reflux). FLUTICASONE PROPION-SALMETEROL (ADVAIR HFA) 230-21 MCG/ACTUATION INHALER    Take 2 Puffs by inhalation two (2) times a day. FLUTICASONE PROPIONATE (FLONASE ALLERGY RELIEF) 50 MCG/ACTUATION NASAL SPRAY    2 Sprays by Both Nostrils route daily. HYDROCHLOROTHIAZIDE (HYDRODIURIL) 12.5 MG TABLET    Take 12.5 mg by mouth daily. HYPER-SAL 3.5 % NEBU    USE 1 VIAL IN NEBULIZER TWICE DAILY    INSULIN GLARGINE (LANTUS) 100 UNIT/ML INJECTION    Take 37u QHS while on steroids, then return to 35u QHS when not taking increased steroids    INSULIN GLARGINE (LANTUS,BASAGLAR) 100 UNIT/ML (3 ML) INPN    35 Units by SubCUTAneous route nightly. LISINOPRIL (PRINIVIL, ZESTRIL) 20 MG TABLET    Take 20 mg by mouth two (2) times a day. LORATADINE (CLARITIN) 10 MG TABLET    Take 10 mg by mouth daily as needed for Allergies. MONTELUKAST (SINGULAIR) 10 MG TABLET    Take 10 mg by mouth nightly.     NOVOLOG FLEXPEN U-100 INSULIN 100 UNIT/ML INPN    Continue home Sliding scale insulin as prior to admission    OMEPRAZOLE (PRILOSEC) 40 MG CAPSULE    Take 40 mg by mouth daily. Indications: gastroesophageal reflux disease    OXYGEN-AIR DELIVERY SYSTEMS    2 L by Nasal route continuous. [facility]    PROLENSA 0.07 % OPHTHALMIC SOLUTION    Administer 1 Drop to right eye daily. SIMETHICONE (GAS-X) 125 MG CAPSULE    Take 125 mg by mouth four (4) times daily as needed for Flatulence. TIOTROPIUM BROMIDE (SPIRIVA RESPIMAT) 2.5 MCG/ACTUATION INHALER    Take 2 Puffs by inhalation daily. These Medications have changed    No medications on file   Stop Taking    PREDNISONE (DELTASONE) 10 MG TABLET    Take 10 mg by mouth every other day. Disclaimer: Sections of this note are dictated using utilizing voice recognition software. Minor typographical errors may be present. If questions arise, please do not hesitate to contact me or call our department.

## 2020-02-29 NOTE — ED NOTES
1st contact with this patient:  Patient identified. Patient noted to have been brought in by EMS. This writer apologized to pt and ems for not being readily available when they arrived and educated them that this writer was off the unit taking a patient to the floor. Pt noted to be on her own oxygen at this time. See triage noted by this writer. Pt is awake alert and oriented x 4 pt with RR 17 but not speaking in full complete sentences   Pt educated on Er flow pt awaits provider debra    1747:  Patient educated on the medication(s) he/she is going to receive, allergies reviewed/verified and patient medicated as ordered. Side rails up and call light within reach. Will continue to monitor. Pt given crackers and diet ginger ale. The plan will be to discharge. Pt reports she is feeling so much better. 1819:  Pt ambulated from room 8 to room 12 and back with her own oxygen, 2L NC.  SpO2 96% pt noted with some CHAUDHARY but pt reports that is normal for her and she feels well enough to go home at this time. This writer will update the provider. Pt reports she will be able to call for a ride. Pt placed back on wall oxygen 2 NC with SpO2 up to 99%    1834: This writer apologized to pt and updated her on the reason for the delay.

## 2020-03-01 LAB
ATRIAL RATE: 85 BPM
CALCULATED P AXIS, ECG09: 76 DEGREES
CALCULATED R AXIS, ECG10: 22 DEGREES
CALCULATED T AXIS, ECG11: 38 DEGREES
DIAGNOSIS, 93000: NORMAL
P-R INTERVAL, ECG05: 152 MS
Q-T INTERVAL, ECG07: 380 MS
QRS DURATION, ECG06: 86 MS
QTC CALCULATION (BEZET), ECG08: 452 MS
VENTRICULAR RATE, ECG03: 85 BPM

## 2020-03-04 ENCOUNTER — HOSPITAL ENCOUNTER (INPATIENT)
Age: 60
LOS: 5 days | Discharge: HOME | DRG: 190 | End: 2020-03-09
Attending: EMERGENCY MEDICINE | Admitting: FAMILY MEDICINE
Payer: MEDICARE

## 2020-03-04 ENCOUNTER — APPOINTMENT (OUTPATIENT)
Dept: GENERAL RADIOLOGY | Age: 60
DRG: 190 | End: 2020-03-04
Attending: PHYSICIAN ASSISTANT
Payer: MEDICARE

## 2020-03-04 DIAGNOSIS — J44.1 ACUTE EXACERBATION OF CHRONIC OBSTRUCTIVE PULMONARY DISEASE (COPD) (HCC): Primary | ICD-10-CM

## 2020-03-04 LAB
ALBUMIN SERPL-MCNC: 3.8 G/DL (ref 3.4–5)
ALBUMIN/GLOB SERPL: 0.9 {RATIO} (ref 0.8–1.7)
ALP SERPL-CCNC: 85 U/L (ref 45–117)
ALT SERPL-CCNC: 38 U/L (ref 13–56)
ANION GAP SERPL CALC-SCNC: 5 MMOL/L (ref 3–18)
ARTERIAL PATENCY WRIST A: YES
AST SERPL-CCNC: 17 U/L (ref 10–38)
BASE EXCESS BLD CALC-SCNC: 4 MMOL/L
BASOPHILS # BLD: 0 K/UL (ref 0–0.1)
BASOPHILS NFR BLD: 0 % (ref 0–2)
BDY SITE: ABNORMAL
BILIRUB SERPL-MCNC: 0.7 MG/DL (ref 0.2–1)
BNP SERPL-MCNC: 216 PG/ML (ref 0–900)
BUN SERPL-MCNC: 12 MG/DL (ref 7–18)
BUN/CREAT SERPL: 16 (ref 12–20)
CALCIUM SERPL-MCNC: 9.6 MG/DL (ref 8.5–10.1)
CHLORIDE SERPL-SCNC: 104 MMOL/L (ref 100–111)
CO2 SERPL-SCNC: 32 MMOL/L (ref 21–32)
CREAT SERPL-MCNC: 0.77 MG/DL (ref 0.6–1.3)
DIFFERENTIAL METHOD BLD: NORMAL
EOSINOPHIL # BLD: 0.1 K/UL (ref 0–0.4)
EOSINOPHIL NFR BLD: 2 % (ref 0–5)
ERYTHROCYTE [DISTWIDTH] IN BLOOD BY AUTOMATED COUNT: 13.5 % (ref 11.6–14.5)
GAS FLOW.O2 O2 DELIVERY SYS: ABNORMAL L/MIN
GLOBULIN SER CALC-MCNC: 4.2 G/DL (ref 2–4)
GLUCOSE SERPL-MCNC: 145 MG/DL (ref 74–99)
HCO3 BLD-SCNC: 29.1 MMOL/L (ref 22–26)
HCT VFR BLD AUTO: 40.7 % (ref 35–45)
HGB BLD-MCNC: 14.2 G/DL (ref 12–16)
LYMPHOCYTES # BLD: 2.1 K/UL (ref 0.9–3.6)
LYMPHOCYTES NFR BLD: 30 % (ref 21–52)
MAGNESIUM SERPL-MCNC: 1.9 MG/DL (ref 1.6–2.6)
MCH RBC QN AUTO: 29.6 PG (ref 24–34)
MCHC RBC AUTO-ENTMCNC: 34.9 G/DL (ref 31–37)
MCV RBC AUTO: 85 FL (ref 74–97)
MONOCYTES # BLD: 0.6 K/UL (ref 0.05–1.2)
MONOCYTES NFR BLD: 9 % (ref 3–10)
NEUTS SEG # BLD: 4.1 K/UL (ref 1.8–8)
NEUTS SEG NFR BLD: 59 % (ref 40–73)
O2/TOTAL GAS SETTING VFR VENT: 0.3 %
PCO2 BLD: 46.8 MMHG (ref 35–45)
PEEP RESPIRATORY: 5 CMH2O
PH BLD: 7.4 [PH] (ref 7.35–7.45)
PIP ISTAT,IPIP: 12
PLATELET # BLD AUTO: 305 K/UL (ref 135–420)
PMV BLD AUTO: 9.2 FL (ref 9.2–11.8)
PO2 BLD: 157 MMHG (ref 80–100)
POTASSIUM SERPL-SCNC: 3.8 MMOL/L (ref 3.5–5.5)
PROT SERPL-MCNC: 8 G/DL (ref 6.4–8.2)
RBC # BLD AUTO: 4.79 M/UL (ref 4.2–5.3)
SAO2 % BLD: 99 % (ref 92–97)
SERVICE CMNT-IMP: ABNORMAL
SODIUM SERPL-SCNC: 141 MMOL/L (ref 136–145)
SPECIMEN TYPE: ABNORMAL
SPONTANEOUS TIMED, IST: YES
TOTAL RESP. RATE, ITRR: 17
TROPONIN I SERPL-MCNC: <0.02 NG/ML (ref 0–0.04)
WBC # BLD AUTO: 6.9 K/UL (ref 4.6–13.2)

## 2020-03-04 PROCEDURE — 83880 ASSAY OF NATRIURETIC PEPTIDE: CPT

## 2020-03-04 PROCEDURE — 82803 BLOOD GASES ANY COMBINATION: CPT

## 2020-03-04 PROCEDURE — 71046 X-RAY EXAM CHEST 2 VIEWS: CPT

## 2020-03-04 PROCEDURE — 5A09357 ASSISTANCE WITH RESPIRATORY VENTILATION, LESS THAN 24 CONSECUTIVE HOURS, CONTINUOUS POSITIVE AIRWAY PRESSURE: ICD-10-PCS | Performed by: FAMILY MEDICINE

## 2020-03-04 PROCEDURE — 96375 TX/PRO/DX INJ NEW DRUG ADDON: CPT

## 2020-03-04 PROCEDURE — 80053 COMPREHEN METABOLIC PANEL: CPT

## 2020-03-04 PROCEDURE — 65660000004 HC RM CVT STEPDOWN

## 2020-03-04 PROCEDURE — 93005 ELECTROCARDIOGRAM TRACING: CPT

## 2020-03-04 PROCEDURE — 74011000250 HC RX REV CODE- 250: Performed by: PHYSICIAN ASSISTANT

## 2020-03-04 PROCEDURE — 36600 WITHDRAWAL OF ARTERIAL BLOOD: CPT

## 2020-03-04 PROCEDURE — 94660 CPAP INITIATION&MGMT: CPT

## 2020-03-04 PROCEDURE — 96365 THER/PROPH/DIAG IV INF INIT: CPT

## 2020-03-04 PROCEDURE — 84484 ASSAY OF TROPONIN QUANT: CPT

## 2020-03-04 PROCEDURE — 99285 EMERGENCY DEPT VISIT HI MDM: CPT

## 2020-03-04 PROCEDURE — 83735 ASSAY OF MAGNESIUM: CPT

## 2020-03-04 PROCEDURE — 74011250636 HC RX REV CODE- 250/636: Performed by: PHYSICIAN ASSISTANT

## 2020-03-04 PROCEDURE — 85025 COMPLETE CBC W/AUTO DIFF WBC: CPT

## 2020-03-04 PROCEDURE — 94762 N-INVAS EAR/PLS OXIMTRY CONT: CPT

## 2020-03-04 PROCEDURE — 94640 AIRWAY INHALATION TREATMENT: CPT

## 2020-03-04 RX ORDER — MAGNESIUM SULFATE HEPTAHYDRATE 40 MG/ML
2 INJECTION, SOLUTION INTRAVENOUS
Status: COMPLETED | OUTPATIENT
Start: 2020-03-04 | End: 2020-03-04

## 2020-03-04 RX ORDER — IPRATROPIUM BROMIDE AND ALBUTEROL SULFATE 2.5; .5 MG/3ML; MG/3ML
3 SOLUTION RESPIRATORY (INHALATION)
Status: COMPLETED | OUTPATIENT
Start: 2020-03-04 | End: 2020-03-04

## 2020-03-04 RX ADMIN — MAGNESIUM SULFATE HEPTAHYDRATE 2 G: 40 INJECTION, SOLUTION INTRAVENOUS at 19:20

## 2020-03-04 RX ADMIN — METHYLPREDNISOLONE SODIUM SUCCINATE 125 MG: 125 INJECTION, POWDER, FOR SOLUTION INTRAMUSCULAR; INTRAVENOUS at 19:19

## 2020-03-04 RX ADMIN — IPRATROPIUM BROMIDE AND ALBUTEROL SULFATE 3 ML: .5; 3 SOLUTION RESPIRATORY (INHALATION) at 19:16

## 2020-03-04 NOTE — ED TRIAGE NOTES
Pt presents with c/o SOB, CHAUDHARY, and chest tightness that began Friday and that she was seen here Saturday but has no improved. Pt noted to be HTN at this time and tachypnic otherwise VSS. Pt AxOx4 and GCS 15. Pt noted to be talking in one word phrases rt dyspnea.

## 2020-03-05 LAB
ANION GAP SERPL CALC-SCNC: 7 MMOL/L (ref 3–18)
APPEARANCE UR: CLEAR
ATRIAL RATE: 73 BPM
BASOPHILS # BLD: 0 K/UL (ref 0–0.1)
BASOPHILS NFR BLD: 0 % (ref 0–2)
BILIRUB UR QL: NEGATIVE
BUN SERPL-MCNC: 15 MG/DL (ref 7–18)
BUN/CREAT SERPL: 15 (ref 12–20)
CALCIUM SERPL-MCNC: 9 MG/DL (ref 8.5–10.1)
CALCULATED P AXIS, ECG09: 76 DEGREES
CALCULATED R AXIS, ECG10: 55 DEGREES
CALCULATED T AXIS, ECG11: 67 DEGREES
CHLORIDE SERPL-SCNC: 102 MMOL/L (ref 100–111)
CO2 SERPL-SCNC: 25 MMOL/L (ref 21–32)
COLOR UR: YELLOW
CREAT SERPL-MCNC: 0.98 MG/DL (ref 0.6–1.3)
DIAGNOSIS, 93000: NORMAL
DIFFERENTIAL METHOD BLD: ABNORMAL
EOSINOPHIL # BLD: 0 K/UL (ref 0–0.4)
EOSINOPHIL NFR BLD: 0 % (ref 0–5)
ERYTHROCYTE [DISTWIDTH] IN BLOOD BY AUTOMATED COUNT: 13.4 % (ref 11.6–14.5)
EST. AVERAGE GLUCOSE BLD GHB EST-MCNC: 189 MG/DL
GLUCOSE BLD STRIP.AUTO-MCNC: 196 MG/DL (ref 70–110)
GLUCOSE BLD STRIP.AUTO-MCNC: 227 MG/DL (ref 70–110)
GLUCOSE BLD STRIP.AUTO-MCNC: 268 MG/DL (ref 70–110)
GLUCOSE BLD STRIP.AUTO-MCNC: 311 MG/DL (ref 70–110)
GLUCOSE SERPL-MCNC: 313 MG/DL (ref 74–99)
GLUCOSE UR STRIP.AUTO-MCNC: >1000 MG/DL
HBA1C MFR BLD: 8.2 % (ref 4.2–5.6)
HCT VFR BLD AUTO: 37.9 % (ref 35–45)
HGB BLD-MCNC: 13.2 G/DL (ref 12–16)
HGB UR QL STRIP: NEGATIVE
KETONES UR QL STRIP.AUTO: ABNORMAL MG/DL
LEUKOCYTE ESTERASE UR QL STRIP.AUTO: NEGATIVE
LYMPHOCYTES # BLD: 0.7 K/UL (ref 0.9–3.6)
LYMPHOCYTES NFR BLD: 8 % (ref 21–52)
MCH RBC QN AUTO: 29.4 PG (ref 24–34)
MCHC RBC AUTO-ENTMCNC: 34.8 G/DL (ref 31–37)
MCV RBC AUTO: 84.4 FL (ref 74–97)
MONOCYTES # BLD: 0.1 K/UL (ref 0.05–1.2)
MONOCYTES NFR BLD: 1 % (ref 3–10)
NEUTS SEG # BLD: 8.8 K/UL (ref 1.8–8)
NEUTS SEG NFR BLD: 91 % (ref 40–73)
NITRITE UR QL STRIP.AUTO: NEGATIVE
P-R INTERVAL, ECG05: 152 MS
PH UR STRIP: 5 [PH] (ref 5–8)
PLATELET # BLD AUTO: 290 K/UL (ref 135–420)
PMV BLD AUTO: 9.2 FL (ref 9.2–11.8)
POTASSIUM SERPL-SCNC: 4.5 MMOL/L (ref 3.5–5.5)
PROT UR STRIP-MCNC: NEGATIVE MG/DL
Q-T INTERVAL, ECG07: 406 MS
QRS DURATION, ECG06: 86 MS
QTC CALCULATION (BEZET), ECG08: 447 MS
RBC # BLD AUTO: 4.49 M/UL (ref 4.2–5.3)
SODIUM SERPL-SCNC: 134 MMOL/L (ref 136–145)
SP GR UR REFRACTOMETRY: 1.03 (ref 1–1.03)
UROBILINOGEN UR QL STRIP.AUTO: 0.2 EU/DL (ref 0.2–1)
VENTRICULAR RATE, ECG03: 73 BPM
WBC # BLD AUTO: 9.6 K/UL (ref 4.6–13.2)

## 2020-03-05 PROCEDURE — 80048 BASIC METABOLIC PNL TOTAL CA: CPT

## 2020-03-05 PROCEDURE — 65660000000 HC RM CCU STEPDOWN

## 2020-03-05 PROCEDURE — 74011000258 HC RX REV CODE- 258: Performed by: STUDENT IN AN ORGANIZED HEALTH CARE EDUCATION/TRAINING PROGRAM

## 2020-03-05 PROCEDURE — 87502 INFLUENZA DNA AMP PROBE: CPT

## 2020-03-05 PROCEDURE — 82962 GLUCOSE BLOOD TEST: CPT

## 2020-03-05 PROCEDURE — 74011250636 HC RX REV CODE- 250/636: Performed by: STUDENT IN AN ORGANIZED HEALTH CARE EDUCATION/TRAINING PROGRAM

## 2020-03-05 PROCEDURE — 94762 N-INVAS EAR/PLS OXIMTRY CONT: CPT

## 2020-03-05 PROCEDURE — 97162 PT EVAL MOD COMPLEX 30 MIN: CPT

## 2020-03-05 PROCEDURE — 74011000250 HC RX REV CODE- 250: Performed by: STUDENT IN AN ORGANIZED HEALTH CARE EDUCATION/TRAINING PROGRAM

## 2020-03-05 PROCEDURE — 94660 CPAP INITIATION&MGMT: CPT

## 2020-03-05 PROCEDURE — 85025 COMPLETE CBC W/AUTO DIFF WBC: CPT

## 2020-03-05 PROCEDURE — 94640 AIRWAY INHALATION TREATMENT: CPT

## 2020-03-05 PROCEDURE — 74011636637 HC RX REV CODE- 636/637: Performed by: STUDENT IN AN ORGANIZED HEALTH CARE EDUCATION/TRAINING PROGRAM

## 2020-03-05 PROCEDURE — 97116 GAIT TRAINING THERAPY: CPT

## 2020-03-05 PROCEDURE — 81003 URINALYSIS AUTO W/O SCOPE: CPT

## 2020-03-05 PROCEDURE — 83036 HEMOGLOBIN GLYCOSYLATED A1C: CPT

## 2020-03-05 PROCEDURE — 36415 COLL VENOUS BLD VENIPUNCTURE: CPT

## 2020-03-05 PROCEDURE — 74011250637 HC RX REV CODE- 250/637: Performed by: STUDENT IN AN ORGANIZED HEALTH CARE EDUCATION/TRAINING PROGRAM

## 2020-03-05 PROCEDURE — 97530 THERAPEUTIC ACTIVITIES: CPT

## 2020-03-05 RX ORDER — ENOXAPARIN SODIUM 100 MG/ML
40 INJECTION SUBCUTANEOUS EVERY 24 HOURS
Status: DISCONTINUED | OUTPATIENT
Start: 2020-03-05 | End: 2020-03-10 | Stop reason: HOSPADM

## 2020-03-05 RX ORDER — INSULIN LISPRO 100 [IU]/ML
INJECTION, SOLUTION INTRAVENOUS; SUBCUTANEOUS
Status: DISCONTINUED | OUTPATIENT
Start: 2020-03-05 | End: 2020-03-10 | Stop reason: HOSPADM

## 2020-03-05 RX ORDER — HYDROCHLOROTHIAZIDE 25 MG/1
12.5 TABLET ORAL DAILY
Status: DISCONTINUED | OUTPATIENT
Start: 2020-03-05 | End: 2020-03-10 | Stop reason: HOSPADM

## 2020-03-05 RX ORDER — PREDNISONE 10 MG/1
10 TABLET ORAL EVERY OTHER DAY
Status: ON HOLD | COMMUNITY
End: 2020-03-25 | Stop reason: SDUPTHER

## 2020-03-05 RX ORDER — IPRATROPIUM BROMIDE 0.5 MG/2.5ML
0.5 SOLUTION RESPIRATORY (INHALATION)
Status: DISCONTINUED | OUTPATIENT
Start: 2020-03-05 | End: 2020-03-10 | Stop reason: HOSPADM

## 2020-03-05 RX ORDER — SODIUM CHLORIDE 450 MG/100ML
157 INJECTION, SOLUTION INTRAVENOUS CONTINUOUS
Status: DISCONTINUED | OUTPATIENT
Start: 2020-03-05 | End: 2020-03-05

## 2020-03-05 RX ORDER — FLUTICASONE PROPIONATE 50 MCG
2 SPRAY, SUSPENSION (ML) NASAL DAILY
Status: DISCONTINUED | OUTPATIENT
Start: 2020-03-05 | End: 2020-03-10 | Stop reason: HOSPADM

## 2020-03-05 RX ORDER — DEXTROSE MONOHYDRATE 100 MG/ML
125-250 INJECTION, SOLUTION INTRAVENOUS AS NEEDED
Status: DISCONTINUED | OUTPATIENT
Start: 2020-03-05 | End: 2020-03-10 | Stop reason: HOSPADM

## 2020-03-05 RX ORDER — ARFORMOTEROL TARTRATE 15 UG/2ML
15 SOLUTION RESPIRATORY (INHALATION)
Status: DISCONTINUED | OUTPATIENT
Start: 2020-03-05 | End: 2020-03-10 | Stop reason: HOSPADM

## 2020-03-05 RX ORDER — MONTELUKAST SODIUM 10 MG/1
10 TABLET ORAL
Status: DISCONTINUED | OUTPATIENT
Start: 2020-03-05 | End: 2020-03-10 | Stop reason: HOSPADM

## 2020-03-05 RX ORDER — MAGNESIUM SULFATE 100 %
4 CRYSTALS MISCELLANEOUS AS NEEDED
Status: DISCONTINUED | OUTPATIENT
Start: 2020-03-05 | End: 2020-03-10 | Stop reason: HOSPADM

## 2020-03-05 RX ORDER — IPRATROPIUM BROMIDE AND ALBUTEROL SULFATE 2.5; .5 MG/3ML; MG/3ML
3 SOLUTION RESPIRATORY (INHALATION)
Status: DISCONTINUED | OUTPATIENT
Start: 2020-03-05 | End: 2020-03-10 | Stop reason: HOSPADM

## 2020-03-05 RX ORDER — DEXTROSE 50 % IN WATER (D50W) INTRAVENOUS SYRINGE
25-50 AS NEEDED
Status: DISCONTINUED | OUTPATIENT
Start: 2020-03-05 | End: 2020-03-05

## 2020-03-05 RX ORDER — LISINOPRIL 20 MG/1
20 TABLET ORAL 2 TIMES DAILY
Status: DISCONTINUED | OUTPATIENT
Start: 2020-03-05 | End: 2020-03-10 | Stop reason: HOSPADM

## 2020-03-05 RX ORDER — ASPIRIN 81 MG/1
81 TABLET ORAL DAILY
Status: DISCONTINUED | OUTPATIENT
Start: 2020-03-05 | End: 2020-03-10 | Stop reason: HOSPADM

## 2020-03-05 RX ORDER — INSULIN GLARGINE 100 [IU]/ML
25 INJECTION, SOLUTION SUBCUTANEOUS DAILY
Status: DISCONTINUED | OUTPATIENT
Start: 2020-03-05 | End: 2020-03-07

## 2020-03-05 RX ORDER — BUDESONIDE 1 MG/2ML
1000 INHALANT ORAL
Status: DISCONTINUED | OUTPATIENT
Start: 2020-03-05 | End: 2020-03-10 | Stop reason: HOSPADM

## 2020-03-05 RX ORDER — PANTOPRAZOLE SODIUM 40 MG/1
40 TABLET, DELAYED RELEASE ORAL DAILY
Status: DISCONTINUED | OUTPATIENT
Start: 2020-03-05 | End: 2020-03-10 | Stop reason: HOSPADM

## 2020-03-05 RX ORDER — SODIUM CHLORIDE 9 MG/ML
157 INJECTION, SOLUTION INTRAVENOUS CONTINUOUS
Status: DISCONTINUED | OUTPATIENT
Start: 2020-03-05 | End: 2020-03-05

## 2020-03-05 RX ADMIN — MONTELUKAST 10 MG: 10 TABLET, FILM COATED ORAL at 21:11

## 2020-03-05 RX ADMIN — HYDROCHLOROTHIAZIDE 12.5 MG: 25 TABLET ORAL at 09:06

## 2020-03-05 RX ADMIN — PIPERACILLIN SODIUM AND TAZOBACTAM SODIUM 4.5 G: 4; .5 INJECTION, POWDER, LYOPHILIZED, FOR SOLUTION INTRAVENOUS at 15:04

## 2020-03-05 RX ADMIN — INSULIN LISPRO 8 UNITS: 100 INJECTION, SOLUTION INTRAVENOUS; SUBCUTANEOUS at 12:41

## 2020-03-05 RX ADMIN — FLUTICASONE PROPIONATE 2 SPRAY: 50 SPRAY, METERED NASAL at 09:42

## 2020-03-05 RX ADMIN — INSULIN LISPRO 2 UNITS: 100 INJECTION, SOLUTION INTRAVENOUS; SUBCUTANEOUS at 18:00

## 2020-03-05 RX ADMIN — ARFORMOTEROL TARTRATE 15 MCG: 15 SOLUTION RESPIRATORY (INHALATION) at 07:51

## 2020-03-05 RX ADMIN — IPRATROPIUM BROMIDE 0.5 MG: 0.5 SOLUTION RESPIRATORY (INHALATION) at 20:35

## 2020-03-05 RX ADMIN — PIPERACILLIN SODIUM AND TAZOBACTAM SODIUM 4.5 G: 4; .5 INJECTION, POWDER, LYOPHILIZED, FOR SOLUTION INTRAVENOUS at 21:11

## 2020-03-05 RX ADMIN — INSULIN LISPRO 4 UNITS: 100 INJECTION, SOLUTION INTRAVENOUS; SUBCUTANEOUS at 21:09

## 2020-03-05 RX ADMIN — BUDESONIDE 1000 MCG: 1 SUSPENSION RESPIRATORY (INHALATION) at 07:51

## 2020-03-05 RX ADMIN — ARFORMOTEROL TARTRATE 15 MCG: 15 SOLUTION RESPIRATORY (INHALATION) at 20:35

## 2020-03-05 RX ADMIN — PANTOPRAZOLE SODIUM 40 MG: 40 TABLET, DELAYED RELEASE ORAL at 09:06

## 2020-03-05 RX ADMIN — PIPERACILLIN SODIUM AND TAZOBACTAM SODIUM 4.5 G: 4; .5 INJECTION, POWDER, LYOPHILIZED, FOR SOLUTION INTRAVENOUS at 09:13

## 2020-03-05 RX ADMIN — Medication 81 MG: at 09:06

## 2020-03-05 RX ADMIN — IPRATROPIUM BROMIDE 0.5 MG: 0.5 SOLUTION RESPIRATORY (INHALATION) at 14:15

## 2020-03-05 RX ADMIN — IPRATROPIUM BROMIDE 0.5 MG: 0.5 SOLUTION RESPIRATORY (INHALATION) at 07:40

## 2020-03-05 RX ADMIN — MONTELUKAST 10 MG: 10 TABLET, FILM COATED ORAL at 02:19

## 2020-03-05 RX ADMIN — PIPERACILLIN SODIUM AND TAZOBACTAM SODIUM 4.5 G: 4; .5 INJECTION, POWDER, LYOPHILIZED, FOR SOLUTION INTRAVENOUS at 03:14

## 2020-03-05 RX ADMIN — LISINOPRIL 20 MG: 20 TABLET ORAL at 09:05

## 2020-03-05 RX ADMIN — SODIUM CHLORIDE 157 ML/HR: 450 INJECTION, SOLUTION INTRAVENOUS at 06:29

## 2020-03-05 RX ADMIN — BUDESONIDE 1000 MCG: 1 SUSPENSION RESPIRATORY (INHALATION) at 20:35

## 2020-03-05 RX ADMIN — METHYLPREDNISOLONE SODIUM SUCCINATE 125 MG: 125 INJECTION, POWDER, FOR SOLUTION INTRAMUSCULAR; INTRAVENOUS at 18:55

## 2020-03-05 RX ADMIN — INSULIN GLARGINE 25 UNITS: 100 INJECTION, SOLUTION SUBCUTANEOUS at 09:00

## 2020-03-05 RX ADMIN — INSULIN LISPRO 6 UNITS: 100 INJECTION, SOLUTION INTRAVENOUS; SUBCUTANEOUS at 09:07

## 2020-03-05 RX ADMIN — ENOXAPARIN SODIUM 40 MG: 40 INJECTION SUBCUTANEOUS at 03:03

## 2020-03-05 NOTE — ED PROVIDER NOTES
EMERGENCY DEPARTMENT HISTORY AND PHYSICAL EXAM    7:00 PM  Date: 3/4/2020  Patient Name: Brianne Storm    History of Presenting Illness     Chief Complaint   Patient presents with    Cough    Shortness of Breath        History Provided By: Patient    HPI: Brianne Storm is a 60 y.o. female with history of SHERRIE, hypertension, GERD, COPD, diabetes, asthma, here for shortness of breath/she had a COPD exacerbation over the weekend. Progressed to get worse despite her using her home nebs. She is on 2 L of nasal cannula at home. HPI limited due to acuity of situation.      PCP: Benjamin Barrow MD    Past History     Past Medical History:  Past Medical History:   Diagnosis Date    Asthma     Chronic lung disease     COPD     Cystocele, midline     Diabetes mellitus ([facility] [address] [address]     GERD (gastroesophageal reflux disease)     Hidradenitis suppurativa     Hyperlipidemia     Hypertension     SHERRIE on CPAP     CPAP    Stress incontinence        Past Surgical History:  Past Surgical History:   Procedure Laterality Date    BREAST SURGERY PROCEDURE UNLISTED      Right breast benign tumor removal    HX APPENDECTOMY      HX CHOLECYSTECTOMY      HX DILATION AND CURETTAGE      Dysfunctional uterine bleeding, thought 2/2 fibroids    HX TUBAL LIGATION         Family History:  Family History   Problem Relation Age of Onset    Hypertension Mother     Stroke Mother     Breast Cancer Mother         Bilateral mastectomies    Cancer Mother         ovarian and breast    Heart Failure Mother     Heart Attack Father         2011    Heart Surgery Father         CABG    Heart Failure Father     COPD Sister         Heavy smoker    Cancer Sister         ovarian    Heart Failure Sister     Lung Disease Sister     Asthma Child     Cancer Maternal Aunt         pancreatic    Cancer Maternal Grandfather         stomach       Social History:  Social History     Tobacco Use    Smoking status: Former Smoker     Packs/day: 1.00     Years: 30.00     Pack years: 30.00     Types: Cigarettes     Start date: 1966     Last attempt to quit: 2006     Years since quittin.5    Smokeless tobacco: Never Used    Tobacco comment: 1-1.5 packs per day   Substance Use Topics    Alcohol use: No    Drug use: No       Allergies: Allergies   Allergen Reactions    Ancef [Cefazolin] Hives    Contrast Agent [Iodine] Anaphylaxis, Shortness of Breath and Swelling     Needs pre-medication for IV contrast with Benadryl, Solu-Medrol    Fish Containing Products Anaphylaxis     Pt states she had a severe allergic reaction at 7 y/o.  Statins-Hmg-Coa Reductase Inhibitors Myalgia    Metformin Other (comments)     Abdominal pain, diarrhea.  Codeine Other (comments)     Altered mental status       Review of Systems     ROS limited due to acuity of situation    Physical Exam     Patient Vitals for the past 12 hrs:   Temp Pulse Resp BP SpO2   20 -- -- -- -- 99 %   20 -- -- -- -- 100 %   20 -- -- -- -- 98 %   20 1930 -- -- -- -- 100 %   20 1915 -- -- -- -- 99 %   20 1900 -- -- -- -- 98 %   20 1801 96.9 °F (36.1 °C) 79 24 (!) 210/94 98 %       Physical Exam   Constitutional: Appears well-developed. Is not diaphoretic. Eyes: Conjunctiva clear, EOMI  Head: Normocephalic and atraumatic. Neck: Normal range of motion. No stridor or tracheal deviation. Cardiovascular: Intact distal pulses. Pulmonary/Chest: Severe respiratory stress, dyspneic, able speak in single words/phrases, retractions. Abdominal: Non distended. Nontender. Musculoskeletal: Normal range of motion. Exhibits no tenderness or swelling. Neurological: Conversant, alert. Skin: Skin is warm and dry. Psychiatric: Has a normal mood and affect. Behavior is normal.   Nursing note and vitals reviewed.     Diagnostic Study Results     Labs -  Recent Results (from the past 12 hour(s))   EKG, 12 LEAD, INITIAL    Collection Time: 03/04/20  6:10 PM   Result Value Ref Range    Ventricular Rate 73 BPM    Atrial Rate 73 BPM    P-R Interval 152 ms    QRS Duration 86 ms    Q-T Interval 406 ms    QTC Calculation (Bezet) 447 ms    Calculated P Axis 76 degrees    Calculated R Axis 55 degrees    Calculated T Axis 67 degrees    Diagnosis       Normal sinus rhythm  Normal ECG  When compared with ECG of 29-FEB-2020 17:01,  Borderline criteria for Inferior infarct are no longer present     CBC WITH AUTOMATED DIFF    Collection Time: 03/04/20  6:15 PM   Result Value Ref Range    WBC 6.9 4.6 - 13.2 K/uL    RBC 4.79 4.20 - 5.30 M/uL    HGB 14.2 12.0 - 16.0 g/dL    HCT 40.7 35.0 - 45.0 %    MCV 85.0 74.0 - 97.0 FL    MCH 29.6 24.0 - 34.0 PG    MCHC 34.9 31.0 - 37.0 g/dL    RDW 13.5 11.6 - 14.5 %    PLATELET 842 692 - 123 K/uL    MPV 9.2 9.2 - 11.8 FL    NEUTROPHILS 59 40 - 73 %    LYMPHOCYTES 30 21 - 52 %    MONOCYTES 9 3 - 10 %    EOSINOPHILS 2 0 - 5 %    BASOPHILS 0 0 - 2 %    ABS. NEUTROPHILS 4.1 1.8 - 8.0 K/UL    ABS. LYMPHOCYTES 2.1 0.9 - 3.6 K/UL    ABS. MONOCYTES 0.6 0.05 - 1.2 K/UL    ABS. EOSINOPHILS 0.1 0.0 - 0.4 K/UL    ABS. BASOPHILS 0.0 0.0 - 0.1 K/UL    DF AUTOMATED     METABOLIC PANEL, COMPREHENSIVE    Collection Time: 03/04/20  6:15 PM   Result Value Ref Range    Sodium 141 136 - 145 mmol/L    Potassium 3.8 3.5 - 5.5 mmol/L    Chloride 104 100 - 111 mmol/L    CO2 32 21 - 32 mmol/L    Anion gap 5 3.0 - 18 mmol/L    Glucose 145 (H) 74 - 99 mg/dL    BUN 12 7.0 - 18 MG/DL    Creatinine 0.77 0.6 - 1.3 MG/DL    BUN/Creatinine ratio 16 12 - 20      GFR est AA >60 >60 ml/min/1.73m2    GFR est non-AA >60 >60 ml/min/1.73m2    Calcium 9.6 8.5 - 10.1 MG/DL    Bilirubin, total 0.7 0.2 - 1.0 MG/DL    ALT (SGPT) 38 13 - 56 U/L    AST (SGOT) 17 10 - 38 U/L    Alk.  phosphatase 85 45 - 117 U/L    Protein, total 8.0 6.4 - 8.2 g/dL    Albumin 3.8 3.4 - 5.0 g/dL    Globulin 4.2 (H) 2.0 - 4.0 g/dL    A-G Ratio 0.9 0.8 - 1.7     MAGNESIUM Collection Time: 03/04/20  6:15 PM   Result Value Ref Range    Magnesium 1.9 1.6 - 2.6 mg/dL   TROPONIN I    Collection Time: 03/04/20  6:15 PM   Result Value Ref Range    Troponin-I, QT <0.02 0.0 - 0.045 NG/ML   NT-PRO BNP    Collection Time: 03/04/20  6:15 PM   Result Value Ref Range    NT pro- 0 - 900 PG/ML   POC G3    Collection Time: 03/04/20  7:34 PM   Result Value Ref Range    Device: BIPAP      FIO2 (POC) 0.30 %    pH (POC) 7.401 7.35 - 7.45      pCO2 (POC) 46.8 (H) 35.0 - 45.0 MMHG    pO2 (POC) 157 (H) 80 - 100 MMHG    HCO3 (POC) 29.1 (H) 22 - 26 MMOL/L    sO2 (POC) 99 (H) 92 - 97 %    Base excess (POC) 4 mmol/L    PEEP/CPAP (POC) 5.0 cmH2O    PIP (POC) 12      Allens test (POC) YES      Total resp. rate 17      Site LEFT RADIAL      Specimen type (POC) ARTERIAL      Performed by Kwabena Beauchamp     Spontaneous timed YES         Radiologic Studies -   No results found. Medical Decision Making     ED Course: Progress Notes, Reevaluation, and Consults:    7:00 PM Initial assessment performed. Patient in likely severe COPD exacerbation, called respiratory for administration of BiPAP and ABG. 8:40 PM- spoke to [facility]. They will admit the patient to their service for additional treatment for COPD. Provider Notes (Medical Decision Making): Based on my history, physical exam, and diagnostic evaluation, the pt appears to have symptoms consistent with an acute COPD exacerbation. Patient initially very dyspneic and required BiPAP therapy. Pt received nebulized bronchodialators and systemic steroid therapy. On re-exam the pt has an improved respiratory pattern but is still significantly symptomatic. I currently have a low suspicion for concomitant VTE or PNA or cardiac cause contributing to their symptoms. They will be admitted for further inhaled therapy and IV steroids.  The case was discussed with the admitting attending    Critical Care Time: Critical Care Time:  The services I provided to this patient were to treat and/or prevent clinically significant deterioration that could result in the failure of one or more body systems and/or organ systems due to COPD requiring BiPAP. Services included the following:  -reviewing nursing notes and old charts  -vital sign assessments  -direct patient care  -medication orders and management  -interpreting and reviewing diagnostic studies/labs  -re-evaluations  -documentation time    Aggregate critical care time was 35 minutes, which includes only time during which I was engaged in work directly related to the patient's care as described above, whether I was at bedside or elsewhere in the Emergency Department. It did not include time spent performing other reported procedures or the services of residents, students, nurses, or advance practice providers. Rafat Aragon MD    8:51 PM      Vital Signs-Reviewed the patient's vital signs. Reviewed pt's pulse ox reading. Records Reviewed: Nursing Notes and Old Medical Records (Time of Review: 7:00 PM)  -I am the first provider for this patient.  -I reviewed the vital signs, available nursing notes, past medical history, past surgical history, family history and social history. Current Outpatient Medications   Medication Sig Dispense Refill    PROLENSA 0.07 % ophthalmic solution Administer 1 Drop to right eye daily.  insulin glargine (LANTUS) 100 unit/mL injection Take 37u QHS while on steroids, then return to 35u QHS when not taking increased steroids 1 Vial 0    albuterol-ipratropium (DUO-NEB) 2.5 mg-0.5 mg/3 ml nebu 3 mL by Nebulization route every four (4) hours as needed (SOB). 30 Nebule 0    simethicone (GAS-X) 125 mg capsule Take 125 mg by mouth four (4) times daily as needed for Flatulence.  loratadine (CLARITIN) 10 mg tablet Take 10 mg by mouth daily as needed for Allergies.       HYPER-SAL 3.5 % nebu USE 1 VIAL IN NEBULIZER TWICE DAILY  6    lisinopril (PRINIVIL, ZESTRIL) 20 mg tablet Take 20 mg by mouth two (2) times a day.  albuterol sulfate (PROVENTIL;VENTOLIN) 2.5 mg/0.5 mL nebu nebulizer solution 0.5 mL by Nebulization route four (4) times daily as needed for Wheezing. To be used with HyperSal nebulizer solution. ICD code: COPD J44.1 60 mL 3    fluticasone propionate (FLONASE ALLERGY RELIEF) 50 mcg/actuation nasal spray 2 Sprays by Both Nostrils route daily.  fluticasone propion-salmeterol (ADVAIR HFA) 230-21 mcg/actuation inhaler Take 2 Puffs by inhalation two (2) times a day.  hydroCHLOROthiazide (HYDRODIURIL) 12.5 mg tablet Take 12.5 mg by mouth daily.  tiotropium bromide (SPIRIVA RESPIMAT) 2.5 mcg/actuation inhaler Take 2 Puffs by inhalation daily.  insulin glargine (LANTUS,BASAGLAR) 100 unit/mL (3 mL) inpn 35 Units by SubCUTAneous route nightly. 28 Units 0    albuterol sulfate 90 mcg/actuation aepb Take 1 Puff by inhalation every four (4) hours. (Patient taking differently: Take 1 Puff by inhalation every four (4) hours as needed.) 1 Inhaler 0    NOVOLOG FLEXPEN U-100 INSULIN 100 unit/mL inpn Continue home Sliding scale insulin as prior to admission (Patient taking differently: 1 Units by SubCUTAneous route three (3) times daily. If BG <100=0u  101-150=5u  151-250=8u  251-300=12u  >300 call MD  ) 1 Pen 0    omeprazole (PRILOSEC) 40 mg capsule Take 40 mg by mouth daily. Indications: gastroesophageal reflux disease      cpap machine kit by Does Not Apply route nightly. M.ENAYELI Has Remy/Dr. Lyssa Norris      OXYGEN-AIR DELIVERY SYSTEMS 2 L by Nasal route continuous. [employer]      aspirin delayed-release 81 mg tablet Take 81 mg by mouth daily.  famotidine (PEPCID) 20 mg tablet Take 20 mg by mouth two (2) times daily as needed for Other (reflux).  montelukast (SINGULAIR) 10 mg tablet Take 10 mg by mouth nightly. Clinical Impression     Clinical Impression:   1.  Acute exacerbation of chronic obstructive pulmonary disease (COPD) ([facility]        Disposition: Admit        This note was dictated utilizing voice recognition software which may lead to typographical errors. I apologize in advance if the situation occurs. If questions arise please do not hesitate to contact me or call our department.

## 2020-03-05 NOTE — PROGRESS NOTES
Problem: Mobility Impaired (Adult and Pediatric)  Goal: *Acute Goals and Plan of Care (Insert Text)  Description  Physical Therapy Goals  Initiated 3/5/2020 and to be accomplished within 7 day(s)  1. Patient will move from supine to sit and sit to supine , scoot up and down and roll side to side in bed with modified independence. 2.  Patient will transfer from bed to chair and chair to bed with modified independence using the least restrictive device. 3.  Patient will perform sit to stand with modified independence. 4.  Patient will ambulate with supervision/set-up for 100 feet with the least restrictive device. 5.  Patient will ascend/descend 4-8 stairs with R handrail(s) with supervision/set-up. Prior Level of Function:   Patient was modified independence for all mobility including gait with intermittent use of rollator. Patient also indicates she furniture-walks when without an AD. Patient lives with her daughter and grandchildren in a duplex, 2 ESTEVAN no HR; patient's bedroom is on the 1st level, but bathroom is upstairs (14 steps R HR). Patient uses 2L supplemental O2 and nebulizer at home. Outcome: Progressing Towards Goal     PHYSICAL THERAPY EVALUATION    Patient: Claire Valerio (83 y.o. female)  Date: 3/5/2020  Primary Diagnosis: COPD with acute exacerbation ([facility] [address] [J44.1]  COPD exacerbation ([facility] [address] [J44.1]        Precautions:   Fall    ASSESSMENT :  PT orders received and patient cleared by nursing to participate with therapy. Patient is a 60 y.o. female admitted to the hospital due to COPD with acute exacerbation. Patient consents to PT evaluation and treatment. Patient received semi-reclined in bed, had just removed bipap and changed to 2L O2 via NC. Patient performs bed mobility with supervision, and demonstrates intact sitting balance at EOB. While at EOB: /51 HR 76 SpO2 95%. Patient performs sit<>stand on 3 occasions with supervision including toilet transfer.  Patient ambulated to bathroom 10ft without use of an AD supervision. Patient able to perform handwashing in standing with good dynamic standing balance without UE support. Patient rested for several minutes in a chair; nursing also administered medications during rest break. Patient then ambulated 75ft with RW supervision on 2L O2. Patient with decreased zee and step length bilaterally. Patient able to determine when she should turn around to head back due to decreased activity tolerance. Emphasized education on activity pacing and monitoring of symptoms of SOB, CHAUDHARY. Patient returned to room to sit in recliner supervision. After ambulation /66 HR 82 SpO2 94%. Patient education regarding role of PT in the hospital, performance of seated therapeutic exercise, and OOB mobility with nursing. Session ended with patient seated upright in recliner, legs elevated, all needs in reach. Patient's decreased activity tolerance, ambulation, and stair negotiation indicates need for skilled physical therapy. Patient will benefit from skilled intervention to address the above impairments.   Patient's rehabilitation potential is considered to be Good  Factors which may influence rehabilitation potential include:   []         None noted  []         Mental ability/status  [x]         Medical condition  []         Home/family situation and support systems  []         Safety awareness  []         Pain tolerance/management  []         Other:      PLAN :  Recommendations and Planned Interventions:   [x]           Bed Mobility Training             [x]    Neuromuscular Re-Education  [x]           Transfer Training                   []    Orthotic/Prosthetic Training  [x]           Gait Training                          [x]    Modalities  [x]           Therapeutic Exercises           [x]    Edema Management/Control  [x]           Therapeutic Activities            [x]    Family Training/Education  [x]           Patient Education  [] Other (comment):    Frequency/Duration: Patient will be followed by physical therapy 1-2 times per day/4-7 days per week to address goals. Discharge Recommendations: Home Health  Further Equipment Recommendations for Discharge: N/A     SUBJECTIVE:   Patient stated I have chairs set up strategically throughout my house that I can reach out to when I'm walking. Nazia Saab \"I got a rollator for Teagan. \"    OBJECTIVE DATA SUMMARY:     Past Medical History:   Diagnosis Date    Asthma     Chronic lung disease     COPD     Cystocele, midline     Diabetes mellitus ([facility]     GERD (gastroesophageal reflux disease)     Hidradenitis suppurativa     Hyperlipidemia     Hypertension     SHERRIE on CPAP     CPAP    Stress incontinence      Past Surgical History:   Procedure Laterality Date    BREAST SURGERY PROCEDURE UNLISTED      Right breast benign tumor removal    HX APPENDECTOMY      HX CHOLECYSTECTOMY      HX DILATION AND CURETTAGE      Dysfunctional uterine bleeding, thought 2/2 fibroids    HX TUBAL LIGATION       Barriers to Learning/Limitations: None  Compensate with: N/A  Home Situation:  Home Situation  Home Environment: Private residence  # Steps to Enter: 2  Rails to Enter: No  One/Two Story Residence: Two story(bedroom on 1st floor, bathroom upstairs)  # of Interior Steps: 14  Interior Rails: Right  Living Alone: No  Support Systems: Child(kim), Family member(s)  Patient Expects to be Discharged to[de-identified] Private residence  Current DME Used/Available at Home: Nebulizer, Oxygen, portable, Walker, rollator, Walker, rolling, Commode, bedside, Shower chair  Critical Behavior:  Neurologic State: Alert  Orientation Level: Oriented X4  Cognition: Follows commands; Appropriate safety awareness  Safety/Judgement: Fall prevention; Insight into deficits  Psychosocial  Patient Behaviors: Calm; Cooperative  Purposeful Interaction: Yes  Pt Identified Daily Priority: Clinical issues (comment)  Caritas Process: Nurture loving kindness;Establish trust;Attend basic human needs; Teaching/learning  Caring Interventions: Reassure; Therapeutic modalities  Reassure: Therapeutic listening; Informing;Caring rounds  Therapeutic Modalities: Intentional therapeutic touch        B LE Strength:    Strength: Generally decreased, functional              B LE Tone & Sensation:   Tone: Normal          Sensation: Intact           B LE Range Of Motion:  AROM: Generally decreased, functional                 Posture:  Posture (WDL): Exceptions to WDL  Posture Assessment: Forward head;Rounded shoulders  Functional Mobility:  Bed Mobility:  Supine to Sit: Supervision  Scooting: Supervision    Transfers:  Sit to Stand: Supervision   Stand to Sit: Supervision       Balance:   Sitting: Intact; Without support  Standing: Impaired; Without support  Standing - Static: Good  Standing - Dynamic : Good    Ambulation/Gait Training:  Distance (ft): 75 Feet (ft)  Assistive Device: Walker, rolling  Ambulation - Level of Assistance: Stand-by assistance  Base of Support: Center of gravity altered  Speed/Pam: Slow  Step Length: Left shortened;Right shortened     Therapeutic Exercises:   Reviewed and performed ankle pumps to increase blood flow and circulation. Pain:  Pain level pre-treatment: 0/10   Pain level post-treatment: 0/10     Activity Tolerance:   Fair  Please refer to the flowsheet for vital signs taken during this treatment. After treatment:   [x]         Patient left in no apparent distress sitting up in chair  []         Patient left in no apparent distress in bed  [x]         Call bell left within reach  [x]   Personal items in reach   [x]         Nursing notified Nessa Messina  []         Caregiver present  []         Bed/chair alarm activated  []         SCDs applied    COMMUNICATION/EDUCATION:   [x]         Role of Physical Therapy in the acute care setting.   [x]         Fall prevention education was provided and the patient/caregiver indicated understanding. [x]         Patient/family have participated as able in goal setting and plan of care. [x]         Patient/family agree to work toward stated goals and plan of care. []         Patient understands intent and goals of therapy, but is neutral about his/her participation. []         Patient is unable to participate in goal setting/plan of care: ongoing with therapy staff. [x]         Out of bed with nursing assistance 3-5 times a day. []         Other: Thank you for this referral.  Oliverio Khalil DPT   Time Calculation: 42 mins      Eval Complexity: History: HIGH Complexity :3+ comorbidities / personal factors will impact the outcome/ POC Exam:HIGH Complexity : 4+ Standardized tests and measures addressing body structure, function, activity limitation and / or participation in recreation  Presentation: MEDIUM Complexity : Evolving with changing characteristics  Clinical Decision Making:Medium Complexity    Overall Complexity:MEDIUM    A student participated in this treatment session. Per CMS Medicare statements and guidelines I certify that the following was true:   1. I was present and directly observed the entire session. 2. I made all skilled judgments and clinical decisions regarding care. 3. I am the practitioner responsible for assessment, treatment, and documentation.     Thank you,   Jesus Grande, PT, DPT

## 2020-03-05 NOTE — PROGRESS NOTES
conducted an initial consultation and Spiritual Assessment for Viv Villarreal, who is a 60 y.o.,female. Patient's Primary Language is: [place]. According to the patient's EMR Mandaeism Affiliation is: Vivian Turcios.     The reason the Patient came to the hospital is:   Patient Active Problem List    Diagnosis Date Noted    Atelectasis of right lung 12/14/2018    Acute exacerbation of chronic obstructive pulmonary disease (COPD) ([address] 10/07/2018    Bilateral carotid bruits 07/30/2018    Palpitations 07/30/2018    Type 2 diabetes with nephropathy ([address] 05/30/2018    Hyperlipidemia 01/24/2018    COPD with acute bronchitis ([address] 51/19/9898    Diastolic CHF, chronic ([address] 02/09/2017    Diverticulosis 02/09/2017    COPD exacerbation ([address] 02/08/2017    Acute exacerbation of COPD with asthma ([address] 02/08/2017    Obesity, Class III, BMI 40-49.9 (morbid obesity) ([address] 08/09/2016    Chest pain 08/09/2016    Dyspnea 08/09/2016    COPD with acute exacerbation ([address] 05/24/2015    COPD (chronic obstructive pulmonary disease) (HCC) 03/27/2015    Allergic rhinitis 03/27/2015    Essential hypertension, benign 09/30/2012    Diabetes mellitus, type 2 ([address] 09/30/2012    Esophageal reflux 09/30/2012    SHERRIE on CPAP         The  provided the following Interventions:  Initiated a relationship of care and support. Explored issues of scout, belief, spirituality and Judaism/ritual needs while hospitalized. Listened empathically. Provided chaplaincy education. Provided information about Spiritual Care Services. Offered prayer and assurance of continued prayers on patient's behalf. Chart reviewed. The following outcomes where achieved:  Patient shared limited information about both their medical narrative and spiritual journey/beliefs. Patient processed feeling about current hospitalization. Patient expressed gratitude for 's visit.     Assessment:  Patient does not have any Amish/cultural needs that will affect patient's preferences in health care. There are no spiritual or Amish issues which require intervention at this time. Plan:  Chaplains will continue to follow and will provide pastoral care on an as needed/requested basis.  recommends bedside caregivers page  on duty if patient shows signs of acute spiritual or emotional distress.     [id number] [address]   [phone]

## 2020-03-05 NOTE — H&P
Admission History and Physical  [facility]      Patient: Viv Villarreal MRN: [id number]  CSN: [id number]    YOB: 1960  Age: 60 y.o. Sex: female      DOA: 3/4/2020       HPI:     Carley De Dios is a 60 y.o. female with PMH of COPD on home O2, IDDM2, HTN, HFpEF, SHERRIE on CPAP, GERD, allergic rhinitis, now presenting with complaint of SOB. Patient w/ 1 wk hx of cough w/ increasing SOB. No chills, fever, congestion, sore throat, or sputum production. No sick contacts. Pt seen in ED on 2/29 for COPD exast. D/c w/ 3 days of prednisone 40mg. Initialkly improved, but has again worsened. She endosrses the dry cough, wheeze and SOB today. Has been using home nebs w/ limited (5-6x today and w/ ~1 hr improvement). Today she also endorses an episode of posttussis emesis.      ED Course:   Vitals: afebrile, 210/94, HR 79, rR24, 98% on 2L NC  CBC unremarkable  BMP   Trops <0.02  Pro-  ABG 7.4, pC02 46.8, p02 157, HCO3 29.1 (bipap)  CXR - unconcerning for PNA, read pending  EKG - NSR  Meds: duo-nebs, solumedrol and mag sulphate    Review of Systems -   General ROS: negative for chills, fever, night sweats, weight gain and weight loss  Psychological ROS: negative for anxiety and depression  Ophthalmic ROS: negative for blurry vision, decreased vision or loss of vision  ENT ROS: negative for headaches, hearing change or visual changes, neg congestion/sore throat  Hematological and Lymphatic ROS: negative for bruising, jaundice  Respiratory ROS: negative hemoptysis, paroxysmal dyspnea, +cough, +SOB, +orthopnea, +wheezing  Cardiovascular ROS: negative for chest pain, dyspnea on exertion, edema, loss of consciousness, or palpitations   Gastrointestinal ROS: negative for abdominal pain, blood in stools, change in stools, constipation, diarrhea, hematemesis, or swallowing difficulty/pain, +nausea/vomiting  Genito-Urinary ROS: negative for dysuria, hematuria or urgency, +urinary frequency  Musculoskeletal ROS: negative for joint pain, joint swelling or muscle pain  Neurological ROS: negative for dizziness, headaches, numbness/tingling or weakness  Dermatological ROS: negative for rash or skin lesion changes    Past Medical History:   Diagnosis Date    Asthma     Chronic lung disease     COPD     Cystocele, midline     Diabetes mellitus ([facility] [address]     GERD (gastroesophageal reflux disease)     Hidradenitis suppurativa     Hyperlipidemia     Hypertension     SHERRIE on CPAP     CPAP    Stress incontinence        Past Surgical History:   Procedure Laterality Date    BREAST SURGERY PROCEDURE UNLISTED      Right breast benign tumor removal    HX APPENDECTOMY      HX CHOLECYSTECTOMY      HX DILATION AND CURETTAGE      Dysfunctional uterine bleeding, thought 2/2 fibroids    HX TUBAL LIGATION         Family History   Problem Relation Age of Onset    Hypertension Mother     Stroke Mother     Breast Cancer Mother         Bilateral mastectomies    Cancer Mother         ovarian and breast    Heart Failure Mother     Heart Attack Father         2011    Heart Surgery Father         CABG    Heart Failure Father     COPD Sister         Heavy smoker    Cancer Sister         ovarian    Heart Failure Sister     Lung Disease Sister     Asthma Child     Cancer Maternal Aunt         pancreatic    Cancer Maternal Grandfather         stomach       Social History     Socioeconomic History    Marital status: LEGALLY      Spouse name: Not on file    Number of children: Not on file    Years of education: Not on file    Highest education level: Not on file   Tobacco Use    Smoking status: Former Smoker     Packs/day: 1.00     Years: 30.00     Pack years: 30.00     Types: Cigarettes     Start date: 1966     Last attempt to quit: 2006     Years since quittin.5    Smokeless tobacco: Never Used    Tobacco comment: 1-1.5 packs per day   Substance and Sexual Activity    Alcohol use: No    Drug use: No    Sexual activity: Never       Allergies   Allergen Reactions    Ancef [Cefazolin] Hives    Contrast Agent [Iodine] Anaphylaxis, Shortness of Breath and Swelling     Needs pre-medication for IV contrast with Benadryl, Solu-Medrol    Fish Containing Products Anaphylaxis     Pt states she had a severe allergic reaction at 7 y/o.  Statins-Hmg-Coa Reductase Inhibitors Myalgia    Metformin Other (comments)     Abdominal pain, diarrhea.  Codeine Other (comments)     Altered mental status       Prior to Admission Medications   Prescriptions Last Dose Informant Patient Reported? Taking? HYPER-SAL 3.5 % nebu   Yes No   Sig: USE 1 VIAL IN NEBULIZER TWICE DAILY   NOVOLOG FLEXPEN U-100 INSULIN 100 unit/mL inpn  Self No No   Sig: Continue home Sliding scale insulin as prior to admission   Patient taking differently: 1 Units by SubCUTAneous route three (3) times daily. If BG <100=0u  101-150=5u  151-250=8u  251-300=12u  >300 call MD     OXYGEN-AIR DELIVERY SYSTEMS   Yes No   Si L by Nasal route continuous. [facility]   PROLENSA 0.07 % ophthalmic solution   Yes No   Sig: Administer 1 Drop to right eye daily. albuterol sulfate (PROVENTIL;VENTOLIN) 2.5 mg/0.5 mL nebu nebulizer solution   No No   Si.5 mL by Nebulization route four (4) times daily as needed for Wheezing. To be used with HyperSal nebulizer solution. ICD code: COPD J44.1   albuterol sulfate 90 mcg/actuation aepb   No No   Sig: Take 1 Puff by inhalation every four (4) hours. Patient taking differently: Take 1 Puff by inhalation every four (4) hours as needed. albuterol-ipratropium (DUO-NEB) 2.5 mg-0.5 mg/3 ml nebu   No No   Sig: 3 mL by Nebulization route every four (4) hours as needed (SOB). aspirin delayed-release 81 mg tablet   Yes No   Sig: Take 81 mg by mouth daily. cpap machine kit   Yes No   Sig: by Does Not Apply route nightly. [place]   Has Remy/Dr. Amie maher (PEPCID) 20 mg tablet   Yes No   Sig: Take 20 mg by mouth two (2) times daily as needed for Other (reflux). fluticasone propion-salmeterol (ADVAIR HFA) 230-21 mcg/actuation inhaler   Yes No   Sig: Take 2 Puffs by inhalation two (2) times a day. fluticasone propionate (FLONASE ALLERGY RELIEF) 50 mcg/actuation nasal spray   Yes No   Si Sprays by Both Nostrils route daily. hydroCHLOROthiazide (HYDRODIURIL) 12.5 mg tablet   Yes No   Sig: Take 12.5 mg by mouth daily. insulin glargine (LANTUS) 100 unit/mL injection   No No   Sig: Take 37u QHS while on steroids, then return to 35u QHS when not taking increased steroids   insulin glargine (LANTUS,BASAGLAR) 100 unit/mL (3 mL) inpn   No No   Si Units by SubCUTAneous route nightly. lisinopril (PRINIVIL, ZESTRIL) 20 mg tablet   Yes No   Sig: Take 20 mg by mouth two (2) times a day. loratadine (CLARITIN) 10 mg tablet   Yes No   Sig: Take 10 mg by mouth daily as needed for Allergies. montelukast (SINGULAIR) 10 mg tablet   Yes No   Sig: Take 10 mg by mouth nightly. omeprazole (PRILOSEC) 40 mg capsule   Yes No   Sig: Take 40 mg by mouth daily. Indications: gastroesophageal reflux disease   predniSONE (DELTASONE) 20 mg tablet   No No   Sig: Take 40 mg by mouth daily for 3 days. With Breakfast   simethicone (GAS-X) 125 mg capsule   Yes No   Sig: Take 125 mg by mouth four (4) times daily as needed for Flatulence. tiotropium bromide (SPIRIVA RESPIMAT) 2.5 mcg/actuation inhaler   Yes No   Sig: Take 2 Puffs by inhalation daily.       Facility-Administered Medications: None       Physical Exam:     Patient Vitals for the past 24 hrs:   Temp Pulse Resp BP SpO2   20 -- 75 20 181/79 99 %   20 -- 71 13 164/67 98 %   20 -- -- -- -- 99 %   20 -- -- -- -- 100 %   20 -- -- -- -- 98 %   20 -- -- -- -- 100 %   20 -- -- -- -- 99 %   20 -- -- -- -- 98 %   20 96.9 °F (36.1 °C) 79 24 (!) 210/94 98 %       Physical Exam:   General:  Alert and responsive and no acute distress. HEENT: Conjunctiva pink, sclera anicteric. PERRLA, EOMI. Lymph: No cervical, supraclavicular, occipital or submandibular lymphadenopathy. No other gross abnormalities present. CV:  RRR, no murmurs/rubs/gallops. No visible pulsations or thrills. Resp:  labored breathing. On bipap. Talking in short sentences. Lungs clear to auscultation. Poor airflow. No wheeze, rales, or rhonchi. Equal expansion bilaterally. Abd:  Soft, nontender, nondistended. No hepatosplenomegaly. +Suprapubic tenderness, no fullness No CVA tenderness. MS:  No joint deformity or instability. No atrophy. Neuro: A+Ox3. 5/5 strength bilateral upper extremities and lower extremities. Ext:  +1 pitting edema to mid shins. 2+ radial and dp pulses bilaterally. Skin:  No rashes, lesions, or ulcers. Good turgor.     IMAGING:    Recent Results (from the past 12 hour(s))   EKG, 12 LEAD, INITIAL    Collection Time: 03/04/20  6:10 PM   Result Value Ref Range    Ventricular Rate 73 BPM    Atrial Rate 73 BPM    P-R Interval 152 ms    QRS Duration 86 ms    Q-T Interval 406 ms    QTC Calculation (Bezet) 447 ms    Calculated P Axis 76 degrees    Calculated R Axis 55 degrees    Calculated T Axis 67 degrees    Diagnosis       Normal sinus rhythm  Normal ECG  When compared with ECG of 29-FEB-2020 17:01,  Borderline criteria for Inferior infarct are no longer present     CBC WITH AUTOMATED DIFF    Collection Time: 03/04/20  6:15 PM   Result Value Ref Range    WBC 6.9 4.6 - 13.2 K/uL    RBC 4.79 4.20 - 5.30 M/uL    HGB 14.2 12.0 - 16.0 g/dL    HCT 40.7 35.0 - 45.0 %    MCV 85.0 74.0 - 97.0 FL    MCH 29.6 24.0 - 34.0 PG    MCHC 34.9 31.0 - 37.0 g/dL    RDW 13.5 11.6 - 14.5 %    PLATELET 392 920 - 270 K/uL    MPV 9.2 9.2 - 11.8 FL    NEUTROPHILS 59 40 - 73 %    LYMPHOCYTES 30 21 - 52 %    MONOCYTES 9 3 - 10 %    EOSINOPHILS 2 0 - 5 %    BASOPHILS 0 0 - 2 % ABS. NEUTROPHILS 4.1 1.8 - 8.0 K/UL    ABS. LYMPHOCYTES 2.1 0.9 - 3.6 K/UL    ABS. MONOCYTES 0.6 0.05 - 1.2 K/UL    ABS. EOSINOPHILS 0.1 0.0 - 0.4 K/UL    ABS. BASOPHILS 0.0 0.0 - 0.1 K/UL    DF AUTOMATED     METABOLIC PANEL, COMPREHENSIVE    Collection Time: 03/04/20  6:15 PM   Result Value Ref Range    Sodium 141 136 - 145 mmol/L    Potassium 3.8 3.5 - 5.5 mmol/L    Chloride 104 100 - 111 mmol/L    CO2 32 21 - 32 mmol/L    Anion gap 5 3.0 - 18 mmol/L    Glucose 145 (H) 74 - 99 mg/dL    BUN 12 7.0 - 18 MG/DL    Creatinine 0.77 0.6 - 1.3 MG/DL    BUN/Creatinine ratio 16 12 - 20      GFR est AA >60 >60 ml/min/1.73m2    GFR est non-AA >60 >60 ml/min/1.73m2    Calcium 9.6 8.5 - 10.1 MG/DL    Bilirubin, total 0.7 0.2 - 1.0 MG/DL    ALT (SGPT) 38 13 - 56 U/L    AST (SGOT) 17 10 - 38 U/L    Alk. phosphatase 85 45 - 117 U/L    Protein, total 8.0 6.4 - 8.2 g/dL    Albumin 3.8 3.4 - 5.0 g/dL    Globulin 4.2 (H) 2.0 - 4.0 g/dL    A-G Ratio 0.9 0.8 - 1.7     MAGNESIUM    Collection Time: 03/04/20  6:15 PM   Result Value Ref Range    Magnesium 1.9 1.6 - 2.6 mg/dL   TROPONIN I    Collection Time: 03/04/20  6:15 PM   Result Value Ref Range    Troponin-I, QT <0.02 0.0 - 0.045 NG/ML   NT-PRO BNP    Collection Time: 03/04/20  6:15 PM   Result Value Ref Range    NT pro- 0 - 900 PG/ML   POC G3    Collection Time: 03/04/20  7:34 PM   Result Value Ref Range    Device: BIPAP      FIO2 (POC) 0.30 %    pH (POC) 7.401 7.35 - 7.45      pCO2 (POC) 46.8 (H) 35.0 - 45.0 MMHG    pO2 (POC) 157 (H) 80 - 100 MMHG    HCO3 (POC) 29.1 (H) 22 - 26 MMOL/L    sO2 (POC) 99 (H) 92 - 97 %    Base excess (POC) 4 mmol/L    PEEP/CPAP (POC) 5.0 cmH2O    PIP (POC) 12      Allens test (POC) YES      Total resp.  rate 17      Site LEFT RADIAL      Specimen type (POC) ARTERIAL      Performed by Kiet Padilla     Spontaneous timed YES           Assessment/Plan:   Richard De Dios is a 60 y.o. female with PMH of COPD on home O2, IDDM2, HTN, HFpEF, SHERRIE on CPAP, GERD, allergic rhinitis, now admitted for COPD exacerbation.      Acute on Chronic Respiratory Failure with hypercapnia   DDx: COPD exast 2/2 URI vs bronchitis/PNA vs environmental trigger  Has hx of asthma/COPD on 2L home 02. Followed by Dr. Sakshi Kelley in OP. Per last pulm note pt may have environmental triggers. -previously hospitalized 4x in 2019 for COPD exacerbations, last hospitalization 10/14-10/17.   -home meds: prednisone 10mg every other day, albuterol neb, spiriva, trelegy nightly  -Afebrile, no leukocytosis on admission. . CXR unconcerning for PNA but official read pending. Will cover for pna. Pt at risk for pseudomonas due to chronic steroid use and has allergy to cefazolin. Plan  -admit to stepdown  -oxygen as needed, goal 88-92%  -cardiac monitoring  -BIPAP  -NPO  -CBC, BMP daily  -FU CXR  -ABG PRN  -sputum culture  -continue bronchodilators/  -Influenza testing  -solumedrol 125 mg daily  -Duonebs q4h   -mIVF 1/2 157ml/hr  -zosyn 4.5g q6hr   -vitals per routine  -consider pulm consult   -consult to CM/PT/OT    Suprapubic tenderness and urinary frequency  -UA r/o UTI      SHERRIE/pulm htn  Intolerant to Cpap. On trelegy nightly.     Insuline dependent Type 2 Diabetes  Home lantus 35u and novolog SSI.  on admission.   -Lantas 25U and increase as needed  -SSI  -poc glucose achs     Hypertension, HFpEF  Stable. Echo 7/7/18 EF 66-70%, LV mild concentric hypertrophy, No left regional wall motion abnorm. SBP in ED elevated 210-160s.  -continue home lisinopril 20mg bid, HCTZ 12.5mg    GERD  Takes omeprazole 40mg qD and pepcid for breakthrough symptoms.    -protonix 40mg qD     Allergic Rhinitis  -home flonase, singulair 10mg     Diet: npo, diabetic diet when cleared  DVT Prophylaxis: lovenox  Code Status: Full Code  Point of [address], daughter, [phone]     Disposition and anticipated LOS:  2 Phillip Ruth MD, PGY-1  [facility] [address]  3/4/2020      Nataliia Villa MD   Resident   FAMILY PRACTICE   H&P   Shared   Date of Service:  03/04/20 2238                    []Hide copied text    []Rachel for details       Senior Addendum to History and Physical  [facility]        Patient: Brittany Galindo MRN: [id number]  CSN: [id number]    YOB: 1960  Age: 60 y.o. Sex: female       DOA: 3/4/2020       HPI:      Brittany Galindo is a 60 y.o. female with PMH COPD on home O2, IDDM2, HTN, HFpEF, SHERRIE on CPAP, GERD, allergic rhinitis, now presenting with complaint of cough and SOB. Patient developed dry cough on Friday and worsening shortness of breath. Patient seen in the ED on 2/29 for COPD Exacerbation and was discharged 3 days of Prednisone 40 mg. Patient was on Prednisone 10 mg every other day. Patient's symptoms improved initally and then worsened. She endorses cough, wheeze and SOB. She has had intermittent leg swelling for the past few weeks. Patient endorses post-tussive emesis x 1 and used 5-6 breathing treatments today.      ED  -BNP, Cardiac enzymes, Mag, CMP, CBC  -CXR     HPI, ROS, PMH, PSH, Family Hx, Social Hx, Home medications, and allergies as above in intern H&P. Which has been reviewed in full. Additional comments to subjective history include:     Physical Exam:      Physical Exam:  General:  Alert and Responsive and in No acute distress. HEENT: Conjunctiva pink, sclera anicteric. PERRL. EOMI. Pharynx moist, nonerythematous. Moist mucous membranes. Thyroid not enlarged, no nodules. No cervical, supraclavicular, occipital or submandibular lymphadenopathy. No other gross abnormalities present. CV:  RRR, no murmurs. PMI not displaced. No visible pulsations or thrills. No carotid bruits. RESP:  Labored breathing. Speaks 3-4 words with difficulty. Lungs clear to auscultation. no wheeze, rales, or rhonchi. Equal expansion bilaterally. ABD:  Soft, nontender, nondistended. Normoactive bowel sounds. MS:  No joint deformity or instability. No atrophy. Neuro:  5/5 strength bilateral upper extremities and lower extremities. CN II-XII intact. A+Ox3. Ext:  +1 pitting edema bilaterally. 2+ radial and dp pulses bilaterally. Skin:  No rashes, lesions, or ulcers. Good turgor.     Labs and imaging reviewed      Assessment/Plan:   60 y.o. female with PMH COPD on home O2, IDDM2, HTN, HFpEF, SHERRIE on CPAP, GERD, allergic rhinitis, now admitted with COPD Exacerbation.     Active Problems:    COPD with acute exacerbation ([facility] [address] (5/24/2015)       COPD exacerbation ([facility]) (2/8/2017)        COPD Exacerbation  DDX: COPD exacerbation 2/2 URI vs Flu, CHF exacerbation, Bronchitis, PNA  At home on 2 L Oxygen NC. No leukocytosis. Normal troponin. . CXR shows hyperinflation compatible with COPD and stable scarring right lung base.  EKG shows NSR  -admit to stepdown  -vitals per routine  -oxygen as needed, goal 88-92%  -cardiac monitoring  -BIPAP  -NPO  -CBC, BMP daily  -ABG PRN  -sputum culture  -Influenza testing  -solumedrol 125 mg daily  -Ciprofloxacin 750 mg BID for 10 days  -Duonebs q4h      For additional problem list, assessment, and plan see intern note     Rachelle Frazier MD  3/4/2020, 10:39 PM

## 2020-03-05 NOTE — ED NOTES
Pt reports SOB. Pt at [facility] ED 2/29/20. PT wheelchair to bathroom. Pt on 2 L nasal cannula. Denies pain. Respiratory at bedside and bi-pap applied. Report and care of pt given to Davida Castleman, [facility].

## 2020-03-05 NOTE — ROUTINE PROCESS
Bedside and Verbal shift change report given to [address] Eric Sanon (oncoming nurse) by Aidan Zhang RN   (offgoing nurse).  Report included the following information SBAR, Intake/Output, MAR, Recent Results and Cardiac Rhythm SR.

## 2020-03-05 NOTE — ROUTINE PROCESS
TRANSFER - IN REPORT:    Verbal report received from Cheryle Hole RN (name) on Odalys Mancilla  being received from ED (unit) for routine progression of care      Report consisted of patients Situation, Background, Assessment and   Recommendations(SBAR). Information from the following report(s) ED Summary was reviewed with the receiving nurse. Opportunity for questions and clarification was provided. Assessment completed upon patients arrival to unit and care assumed.

## 2020-03-05 NOTE — DIABETES MGMT
Diabetes Patient/Family Education Record  Factors That  May Influence Patients Ability  to Learn or  Comply with Recommendations   []   Language barrier    []   Cultural needs   []   Motivation    []   Cognitive limitation    []   Physical   []   Education    []   Physiological factors   []   Hearing/vision/speaking impairment   []   Adventist beliefs    []   Financial factors   []  Other:   [x]  No factors identified at this time.      Person Instructed:   [x]   Patient   []   Family   []  Other     Preference for Learning:   [x]   Verbal   []   Written   []  Demonstration     Level of Comprehension & Competence:    [x]  Good                                      [] Fair                                     []  Poor                             []  Needs Reinforcement   [x]  Teachback completed    Education Component:   [x]  Medication management, including how to administer insulin (if appropriate) and potential medication interactions Lantus insulin 35 units nightly, Novolog insulin per sliding scale)     []  Nutritional management -obtain usual meal pattern   []  Exercise   []  Signs, symptoms, and treatment of hyperglycemia and hypoglycemia   [] Prevention, recognition and treatment of hyperglycemia and hypoglycemia   [x]  Importance of blood glucose monitoring and how to obtain a blood glucose meter    []  Instruction on use of the blood glucose meter   [x]  Discuss the importance of HbA1C monitoring 8.2% (189 mg/dL estimated average glucose)   []  Sick day guidelines   []  Proper use and disposal of lancets, needles, syringes or insulin pens (if appropriate)   [x]  Potential long-term complications (retinopathy, kidney disease, neuropathy, foot care)   [] Information about whom to contact in case of emergency or for more information    [x]  Goal:  Patient/family will demonstrate understanding of Diabetes Self Management Skills by: 3/12/20  Plan for post-discharge education or self-management support:    [] Outpatient class schedule provided            [x] Patient Declined (has from previous admissions)    [] Scheduled for outpatient classes (date) _______  Verify:  Does patient understand how diabetes medications work? yes  Does patient know what their most recent A1c is?  Reviewed  Does patient monitor glucose at home? yes  Does patient have difficulty obtaining diabetes medications or testing supplies? no       Chris Cabello RD, CDE

## 2020-03-05 NOTE — DIABETES MGMT
NUTRITIONAL ASSESSMENT GLYCEMIC CONTROL/ PLAN OF CARE     Marlys Brennan           60 y.o.           3/4/2020                 1. Acute exacerbation of chronic obstructive pulmonary disease (COPD) ([facility])       INTERVENTIONS/PLAN:   If glucose remains elevated recommend increasing Lantus to 30 units daily  Will advance to the very insulin resistant correctional Lispro scale    ASSESSMENT:   Pt is a 60year old female with a past medical history significant for COPD, type 2 diabetes, hypertension, SHERRIE, and GERD who presented with SOB. Blood glucose elevated. Steroids likely contributing to elevations. Pt receives solu-medrol 125 mg daily. Pt states she continues to try hard to eat right and manage her blood glucose but steroids make it a challenge. Adjusted Lantus when on high dose steroids. Pt has been seen during previous admissions and reports no questions about diabetes at this time. Pt receiving a diabetic diet.      Diabetes Management:   Recent blood glucose:  145, 313, 268 mg/dL   Within target range (non-ICU: <140; ICU<180): [] Yes   [x]  No    Current Insulin regimen:   Lantus insulin 25 units daily   Correctional Lispro insulin 4 times daily ACHS  Home medication/insulin regimen:   Lantus insulin 35 units nightly  Novolog insulin per sliding scale   HbA1c: 8.2% (estimated average glucose of 189 mg/dL)  Adequate glycemic control PTA:  [] Yes  [x] No     SUBJECTIVE/OBJECTIVE:   Information obtained from: patient, chart review     Diet: Diabetic consistent carbohydrate     Medications: [x] Reviewed     Most Recent POC Glucose:   Recent Labs     03/05/20  0541 03/04/20  1815   * 145*      Labs:   Lab Results   Component Value Date/Time    Hemoglobin A1c 8.2 (H) 03/05/2020 05:41 AM     Lab Results   Component Value Date/Time    Sodium 134 (L) 03/05/2020 05:41 AM    Potassium 4.5 03/05/2020 05:41 AM    Chloride 102 03/05/2020 05:41 AM    CO2 25 03/05/2020 05:41 AM    Anion gap 7 03/05/2020 05:41 AM Glucose 313 (H) 03/05/2020 05:41 AM    BUN 15 03/05/2020 05:41 AM    Creatinine 0.98 03/05/2020 05:41 AM    Calcium 9.0 03/05/2020 05:41 AM    Magnesium 1.9 03/04/2020 06:15 PM    Phosphorus 3.1 01/19/2019 06:51 AM    Albumin 3.8 03/04/2020 06:15 PM     Anthropometrics:  BMI (calculated): 45.5  Wt Readings from Last 1 Encounters:   03/04/20 116.6 kg (257 lb)      Ht Readings from Last 1 Encounters:   03/04/20 5' 3\" (1.6 m)     Estimated Nutrition Needs:4418-0001 Kcal/day, 54-68 grams protein/day   Based on:   [] Actual BW    []  IBW   [x]  Adjusted BW   (68 Kg)    Nutrition Diagnoses:    Altered nutrition related lab value related to diabetes as evidenced by Hemoglobin A1c of 8.2%  Nutrition Interventions: diabetes education, assessment of home mangement  Goal: Blood glucose will be within target range of  mg/dL by 3/08/20     Nutrition Monitoring and Evaluation    []     Monitor po intake on meal rounds  [x]     Continue inpatient monitoring and intervention  []     Other:    Sabine Nichole, [address], [address] [facility]  Ext [phone]

## 2020-03-05 NOTE — PROGRESS NOTES
Franck Lewis  Intern Progress Note    Patient: Marla Marcum MRN: [id number]   SSN: [id number]  YOB: 1960   Age: 60 y.o. Sex: female      Admit Date: 3/4/2020    LOS: 1 day   Chief Complaint   Patient presents with    Cough    Shortness of Breath       Subjective:     Patient seen at bedside this morning. Feels much improved from last night. Breathing comfortably on Bipap. Speaking in full sentences. States she feels her blood sugar is high this morning because she missed her insulin dose last night due to being in the ER. Anxious about eating and getting some insulin.      ROS:   Denies:   - fevers/chill  - headache  - CP  - N/V or abdominal pain     Objective:     PE:  - General: patient is in no acute distress  - Cv: RRR, no murmurs/gallops/rubs, peripheral pulses intact  - Resp: Lungs decreased at bases, scattered wheezing in apices  - Abdominal: Soft, non-tender, non-distended  - MSK: trace swelling in extermities     Vitals:   Patient Vitals for the past 24 hrs:   Temp Pulse Resp BP SpO2   03/05/20 0839 97.3 °F (36.3 °C) 78 22 121/52 97 %   03/05/20 0259 97.4 °F (36.3 °C) 69 30 158/73 99 %   03/05/20 0230 -- 70 19 153/72 --   03/05/20 0215 -- 69 20 160/82 --   03/05/20 0200 -- 67 20 180/69 --   03/05/20 0145 -- 69 21 179/84 --   03/05/20 0130 -- 68 18 159/68 --   03/05/20 0115 -- 70 19 160/66 --   03/05/20 0100 -- 68 20 144/73 --   03/05/20 0045 -- 70 23 169/89 --   03/05/20 0030 -- 74 21 140/68 --   03/05/20 0015 -- 73 21 146/62 --   03/05/20 0000 -- -- -- 156/73 --   03/04/20 2345 -- 76 16 169/65 --   03/04/20 2343 -- -- -- -- 100 %   03/04/20 2330 -- 71 19 135/82 --   03/04/20 2315 -- 71 18 153/64 --   03/04/20 2300 -- 71 20 146/54 --   03/04/20 2045 -- 75 20 181/79 99 %   03/04/20 2030 -- 71 13 164/67 98 %   03/04/20 2000 -- -- -- -- 99 %   03/04/20 1953 -- -- -- -- 100 %   03/04/20 1945 -- -- -- -- 98 %   03/04/20 1930 -- -- -- -- 100 %   03/04/20 1915 -- -- -- -- 99 %   03/04/20 1900 -- -- -- -- 98 %   03/04/20 1801 96.9 °F (36.1 °C) 79 24 (!) 210/94 98 %         Intake/Output Summary (Last 24 hours) at 3/5/2020 0847  Last data filed at 3/5/2020 0430  Gross per 24 hour   Intake 0 ml   Output 300 ml   Net -300 ml       Labs reviewed. Pertinent studies: UA unremarkable    ABG 7.4/46.8/157/29.1    Assessment/Plan:     Lauri De Dios is a 60 y.o. female with PMH of COPD on home O2, IDDM2, HTN, HFpEF, SHERRIE on CPAP, GERD, allergic rhinitis, now admitted for COPD exacerbation.      COPD exacerbation: Acute on Chronic Respiratory Failure with hypercapnia   Has hx of asthma/COPD on 2L home 02. Followed by Dr. Valente in OP. Per last pulm note pt may have environmental triggers. At home takes prednisone 10mg every other day, albuterol neb, spiriva, trelegy nightly. Afebrile, no leukocytosis on admission. . CXR with hyperinflation but no concern for infection. Patient has been on BiPAP overnight and is improving this AM  - Continue Bipap PRN, oxygen as needed, goal 88-92%  - FU sputum culture  - continue brovana/pulmicort  - continue atrovent nebs q6h  - continue home singulair  - FU influenza PCR  - Continue Solumedrol  - Protonix for SUP  - Continue zosyn, may consider d/c soon- does not appear to be infectious etiology       SHERRIE/pulm htn  Intolerant to Cpap. On trelegy nightly. - tolerating BiPAP here      Insuline dependent Type 2 Diabetes  Home lantus 35u and novolog SSI.  on admission.   -Lantas 25U and increase as needed  -SSI  -poc glucose achs  - diabetic diet     Hypertension, HFpEF  Stable. Echo 7/7/18 EF 66-70%, LV mild concentric hypertrophy, No left regional wall motion abnorm.  's-150's this morning.   -continue home lisinopril 20mg bid, HCTZ 12.5mg     GERD  Takes omeprazole 40mg qD and pepcid for breakthrough symptoms.   -continue protonix 40mg qD     Allergic Rhinitis  -home flonase, singulair 10mg     Diet: npo, diabetic diet when cleared  DVT Prophylaxis: lovenox  Code Status: Full Code  Point of [address], daughter, [phone]     Disposition and anticipated LOS:  2 midnights    Signed By: Anusha Alvarado MD     March 5, 2020

## 2020-03-05 NOTE — PROGRESS NOTES
Problem: Falls - Risk of  Goal: *Absence of Falls  Description  Document Akash Diego Fall Risk and appropriate interventions in the flowsheet.   Outcome: Progressing Towards Goal  Note: Fall Risk Interventions:                      History of Falls Interventions: Consult care management for discharge planning, Bed/chair exit alarm, Door open when patient unattended, Evaluate medications/consider consulting pharmacy, Assess for delayed presentation/identification of injury for 48 hrs (comment for end date)         Problem: Patient Education: Go to Patient Education Activity  Goal: Patient/Family Education  Outcome: Progressing Towards Goal     Problem: Chronic Obstructive Pulmonary Disease (COPD)  Goal: *Oxygen saturation during activity within specified parameters  Outcome: Progressing Towards Goal  Goal: *Able to remain out of bed as prescribed  Outcome: Progressing Towards Goal  Goal: *Absence of hypoxia  Outcome: Progressing Towards Goal  Goal: *Optimize nutritional status  Outcome: Progressing Towards Goal     Problem: Patient Education: Go to Patient Education Activity  Goal: Patient/Family Education  Outcome: Progressing Towards Goal

## 2020-03-05 NOTE — DISCHARGE SUMMARY
[facility]  Discharge Summary    Patient: Marlys Brennan MRN: [id number]  CSN: [id number]    YOB: 1960  Age: 60 y.o. Sex: female      Admission Date: 3/4/2020 Discharge Date: 3/9/2020   Attending: Michele James MD PCP: Pasquale Chavis MD     ===================================================================    Reason for Admission: COPD with acute exacerbation ([facility] [address] [J44.1]  [facility] [J44.1]    Discharge Diagnoses:   COPD exacerbation: Acute on chronic respiratory failure with hypercapnia   SHERRIE/PHTN   IDDM2  HTN, HFpEF  GERD  Allergic rhinitis     Important notes to PCP/ follow-up studies and evaluations   -Pt completed 4 of 5 days of antibiotics(Zosyn and Augmentin) therefore was discharged with 2 doses of Augmentin  -Was discharged on steroid taper 60mg(3tabs) for 3 days, 40mg(2tabs) for 3 days, 20mg(1 tab) for 3 days, and then 10mg for 1 day. Then resume 10mg every other day  -Pt discharged on Lantus 35units and novolog SSI      Pending labs and studies:  None    Operative Procedures:   None    Discharge Medications:     Current Discharge Medication List      START taking these medications    Details   amoxicillin-clavulanate (AUGMENTIN) 875-125 mg per tablet Take 1 Tab by mouth every twelve (12) hours. Qty: 2 Tab, Refills: 0      lactobacillus sp. 50 billion cpu (BIO-K PLUS) 50 billion cell -375 mg cap capsule Take 1 Cap by mouth daily. Qty: 30 Cap, Refills: 0         CONTINUE these medications which have CHANGED    Details   predniSONE (DELTASONE) 20 mg tablet Take 60mg(3tabs) for 3 days, Take 40mg(2tabs) for 3 days, Take 20mg(1 tab) for 3 days, and then 10mg for 1 day. Then resume 10mg every other day. Qty: 20 Tab, Refills: 0             CONTINUE these medications which have NOT CHANGED    Details   predniSONE (DELTASONE) 10 mg tablet Take 10 mg by mouth every other day.       simethicone (GAS-X) 125 mg capsule Take 125 mg by mouth four (4) times daily as needed for Flatulence. lisinopril (PRINIVIL, ZESTRIL) 20 mg tablet Take 20 mg by mouth two (2) times a day. albuterol sulfate (PROVENTIL;VENTOLIN) 2.5 mg/0.5 mL nebu nebulizer solution 0.5 mL by Nebulization route four (4) times daily as needed for Wheezing. To be used with HyperSal nebulizer solution. ICD code: COPD J44.1  Qty: 60 mL, Refills: 3      fluticasone propionate (FLONASE ALLERGY RELIEF) 50 mcg/actuation nasal spray 2 Sprays by Both Nostrils route daily. fluticasone propion-salmeterol (ADVAIR HFA) 230-21 mcg/actuation inhaler Take 2 Puffs by inhalation two (2) times a day. hydroCHLOROthiazide (HYDRODIURIL) 12.5 mg tablet Take 12.5 mg by mouth daily. tiotropium bromide (SPIRIVA RESPIMAT) 2.5 mcg/actuation inhaler Take 2 Puffs by inhalation daily. insulin glargine (LANTUS,BASAGLAR) 100 unit/mL (3 mL) inpn 35 Units by SubCUTAneous route nightly. Qty: 28 Units, Refills: 0      albuterol sulfate 90 mcg/actuation aepb Take 1 Puff by inhalation every four (4) hours. Qty: 1 Inhaler, Refills: 0      NOVOLOG FLEXPEN U-100 INSULIN 100 unit/mL inpn Continue home Sliding scale insulin as prior to admission  Qty: 1 Pen, Refills: 0      omeprazole (PRILOSEC) 40 mg capsule Take 40 mg by mouth daily. Indications: gastroesophageal reflux disease      OXYGEN-AIR DELIVERY SYSTEMS 2 L by Nasal route continuous. [employer]      aspirin delayed-release 81 mg tablet Take 81 mg by mouth daily. famotidine (PEPCID) 20 mg tablet Take 20 mg by mouth two (2) times daily as needed for Other (reflux). montelukast (SINGULAIR) 10 mg tablet Take 10 mg by mouth nightly. PROLENSA 0.07 % ophthalmic solution Administer 1 Drop to right eye daily. loratadine (CLARITIN) 10 mg tablet Take 10 mg by mouth daily as needed for Allergies.              STOP taking these medications       insulin glargine (LANTUS) 100 unit/mL injection Comments:   Reason for Stopping: Repeated Medication              Disposition: Home    Consultants:    [address] Course (including pertinent history and physical findings)  Patient was admitted for SOB/increased work of breathing. 3 days prior to admission was seen in ED for similar complaints and discharged with course of prednisone. Her respiratory status continued to decline at home despite the steroids and increased use of her inhalers, and she again returned to the ED. On arrival to the ED she had increased work of breathing and had to be placed on Bipap. SPO2 was stable on arrival. ABG with a small degree of hypercapnia, but patient is likely chronically hypercapnic, unchanged when compared to prior ABGs. Labs WNL. No fevers or leukocytosis. CXR with hyperinflation, but otherwise no signs of infiltrate. Patient was admitted to step down. Started on Zosyn for concern for Pseudomonas given degree of COPD and many recent exacerbations. Was also started on Solumedrol. Duonebs q4h. Patient weaned off of bipap during the day and continued to use Bipap at night for her SHERRIE. Weaned back down to [facility] which is her home oxygen requirement. Continued to improve day by day. Was discharged on steroid taper 60mg(3tabs) for 3 days, 40mg(2tabs) for 3 days, 20mg(1 tab) for 3 days, and then 10mg for 1 day. Then resume 10mg every other day. COPD exacerbation: Acute on Chronic Respiratory Failure with hypercapnia   Has hx of asthma/COPD on 2L home 02. Followed by Dr. Dixon Nolan in OP. Per last pulm note pt may have environmental triggers. At home takes prednisone 10mg every other day, albuterol neb, spiriva, trelegy nightly. Afebrile, no leukocytosis on admission. . CXR with hyperinflation but no concern for infection. Patient has been on BiPAP overnight, and has been on 2L O2 throughout the day. States her breathing is improving. Pt maintained on Bipap PRN, oxygen PRN, goal 88-92%.  Pt on brovana/pulmicort, atrovent nebs q6h, Mucinex, home singulair while admitted. Pt on Solumedrol 125mg daily from (3/4-3/7), Started Prednisone 60mg daily 3/7/20. Pt also treated with zosyn (3/5-3/7) and then started on Augmentin (3/8-3/14)(Pt discharged on Augmentin  and Prednisone Taper 60mg(3tabs) for 3 days, 40mg(2tabs) for 3 days, 20mg(1 tab) for 3 days, and then 10mg for 1 day. Then resume 10mg every other day.     Diarrhea: Pt with 7 watery stools on 3/7/2020. No SIRS. Abdominal tenderness in the epigastric region. Probiotic added 3/7/20 however given the number of stools and the watery consistency considered C Diff AG&Toxin A/B. However since stools became formed and pt continuing to be without SIRS pt monitored and then discharged home.       SHERRIE/pulm htn  Intolerant to Cpap. On trelegy nightly. Tolerating BiPAP while admitted and continued BiPAP at night. However, pt's breathing improved and will be using home CPAP on discharge.      Insulin dependent Type 2 Diabetes  Home regimen (lantus 35u and novolog SSI).  on admission. Fasting blood sugars in the 200s. Likely exacerbated by steroids. Pt initially on Lantas 25U which was increased to Lantus 35units since fasting BG in the 200. Pt on Accuchecks ACHS and SSI while admitted. Pt on diabetic diet. Pt discharged on Lantus 35units and novolog SSI     Hypertension, HFpEF  Stable. Echo 7/7/18 EF 66-70%, LV mild concentric hypertrophy, No left regional wall motion abnorm. SBP 120's-150's this morning. Continued home lisinopril 20mg bid, HCTZ 12.5mg while admitted.      GERD  Takes omeprazole 40mg daily and pepcid for breakthrough symptoms at home. Pt on protonix 40mg daily while admitted     Allergic Rhinitis  Continued home flonase, singulair 10mg and while admitted. CURRENT ADMISSION IMAGING RESULTS   Xr Chest Pa Lat    Result Date: 3/4/2020  IMPRESSION: Hyperinflation compatible with COPD. Stable scarring right lung base.          Cardiology Procedures/Testing:  MODALITY RESULTS   EKG Results for orders placed or performed during the hospital encounter of 03/04/20   EKG, 12 LEAD, INITIAL   Result Value Ref Range    Ventricular Rate 73 BPM    Atrial Rate 73 BPM    P-R Interval 152 ms    QRS Duration 86 ms    Q-T Interval 406 ms    QTC Calculation (Bezet) 447 ms    Calculated P Axis 76 degrees    Calculated R Axis 55 degrees    Calculated T Axis 67 degrees    Diagnosis       Normal sinus rhythm  Normal ECG  When compared with ECG of 29-FEB-2020 17:01,  Borderline criteria for Inferior infarct are no longer present  Confirmed by Kenneth Link ([id number]) on 3/5/2020 3:12:21 PM           Special Testing/Procedures:  MODALITY RESULTS   MICRO All Micro Results     Procedure Component Value Units Date/Time    C. DIFFICILE AG & TOXIN A/B [[id number]] Collected:  03/08/20 0915    Order Status:  Canceled Specimen:  Stool     INFLUENZA A AND B BY PCR [[id number]] Collected:  03/05/20 0048    Order Status:  Completed Specimen:  Serum Updated:  03/06/20 1835     Influenza A NEGATIVE         Influenza B NEGATIVE         Comment: (NOTE)  Performed At: [address] [id number]  Marvin Coko MD UP:[id number]         CULTURE, RESPIRATORY/SPUTUM/BRONCH Minnette Hoe STAIN [[id number]] Collected:  03/05/20 0200    Order Status:  Canceled Specimen:  Sputum          ABG Lab Results   Component Value Date/Time    pH (POC) 7.401 03/04/2020 07:34 PM    pCO2 (POC) 46.8 (H) 03/04/2020 07:34 PM    pO2 (POC) 157 (H) 03/04/2020 07:34 PM    HCO3 (POC) 29.1 (H) 03/04/2020 07:34 PM    FIO2 (POC) 0.30 03/04/2020 07:34 PM        Laboratory Results:  LABORATORY RESULTS   HEMATOLOGY Lab Results   Component Value Date/Time    WBC 8.5 03/09/2020 03:44 AM    HGB 13.3 03/09/2020 03:44 AM    HCT 39.1 03/09/2020 03:44 AM    PLATELET 740 84/54/1447 03:44 AM    MCV 86.3 03/09/2020 03:44 AM       CHEMISTRIES Lab Results   Component Value Date/Time    Sodium 134 (L) 03/09/2020 03:44 AM    Potassium 4.4 03/09/2020 03:44 AM    Chloride 99 (L) 03/09/2020 03:44 AM    CO2 32 03/09/2020 03:44 AM    Anion gap 3 03/09/2020 03:44 AM    Glucose 298 (H) 03/09/2020 03:44 AM    BUN 19 (H) 03/09/2020 03:44 AM    Creatinine 1.10 03/09/2020 03:44 AM    BUN/Creatinine ratio 17 03/09/2020 03:44 AM    GFR est AA >60 03/09/2020 03:44 AM    GFR est non-AA 51 (L) 03/09/2020 03:44 AM    Calcium 8.8 03/09/2020 03:44 AM      HEPATIC FUNCTION Lab Results   Component Value Date/Time    Albumin 3.8 03/04/2020 06:15 PM    Bilirubin, direct <0.1 02/08/2017 10:00 PM    Bilirubin, total 0.7 03/04/2020 06:15 PM    Protein, total 8.0 03/04/2020 06:15 PM    Globulin 4.2 (H) 03/04/2020 06:15 PM    A-G Ratio 0.9 03/04/2020 06:15 PM    AST (SGOT) 17 03/04/2020 06:15 PM    ALT (SGPT) 38 03/04/2020 06:15 PM    Alk.  phosphatase 85 03/04/2020 06:15 PM       NT-proBNP Lab Results   Component Value Date/Time    NT pro- 03/04/2020 06:15 PM          Functional status and cognitive function:    Status: alert, cooperative, no distress, appears stated age  Condition: STABLE  Disposition: Home    Diet: Diabetic    Code status and advanced care plan: Full    Point of Contact Kay Maharaj, daughter, [phone]     Patient Education:  Patient was educated on the following topics prior to discharge:COPD and COPD management     Follow-up:   Follow-up Information     Follow up With Specialties Details Why Contact Info    Anoop Fonseca MD [facility] On 3/10/2020 Hospital Follow-up appointment at [address] [place] [address] at  2:20 pm on 3/10/20 [address]   [phone]            ================================================  Benja Betancourt MD, PGY-1   [address] [place], [place] [address]   Intern Pager: [phone]   March 9, 2020, 9:05 AM

## 2020-03-05 NOTE — PROGRESS NOTES
[address]  RESPONSE TO [facility] INQUIRY      Patient: Marizol Sanchez MRN: [id number]  CSN: [id number]    YOB: 1960  Age: 60 y.o. Sex: female         [address] [facility]   Patient admitted with COPD with acute exacerbation. Pt noted to have documented Insuline dependent Type 2 Diabetes Insuline dependent Type 2 Diabetes noted in H&P by Nely Wills MD on 3/4/2020. Point-of-Care Tests on 3/5/2020 at 0838 reveals  and 3/5/2020 at 1132 is 311.     Please document in progress notes and discharge summary further specificity regarding the control status of DM:     · Uncontrolled DM with hyperglycemia  · Other, please specify  · Clinically unable to determine  ·    The medical record reflects the following:      Risk Factors: PMH DM type 2       Clinical Indicators: Blood glucose 268, 311      Treatment: Receiving Humalog, lantus, Diabetic diet consistant carb     Thank you,  Garey Sandhoff, RN, BSN, [facility]  [phone]    RESPONSE   I agree with poorly controlled DM with hyperglycemia, likely exacerbated by steroid use.      Dougie Reynoso MD , PGY-1   [address] Medicine   Senior Pager: [phone]   March 5, 2020, 4:44 PM

## 2020-03-05 NOTE — PROGRESS NOTES
Reason for Admission:   COPD with acute exacerbation ([facility] [address] [J44.1]  COPD exacerbation ([facility] [address] [J44.1]               RRAT Score:    30%             Resources/supports as identified by patient/family:   Has family supports and a personal care aide (5 days a week/6 hours each day). Top Challenges facing patient (as identified by patient/family and CM):    NONE    Finances/Medication cost?   NO    Transportation  Family    Support system or lack thereof? Family and personal care aide    Living arrangements? Her daughter Alka Underwood and grandchildren resides with her. Self-care/ADLs/Cognition? Alert and oriented. Needs assistance with ADLs. Current Advanced Directive/Advance Care Plan:   no                          Plan for utilizing home health:  Patient has no home health orders in place at this time. This writer will continue to monitor for potential home health needs and orders. Likelihood of readmission:   HIGH    Transition of Care Plan:  Patient plans to return home with help from her family. Family will also transport home, at the time of discharge. This writer will monitor for any discharge recommendations. Initial assessment completed with patient. Cognitive status of patient: Alert and oriented. Face sheet information confirmed:  yes. The patient designates her daughter Alka Palacios) and her sister (Kimberly Barros)  to participate in her discharge plan and to receive any needed information. This [address], with her daughter Alka Palcaios) and her grandchildren. Patient is able to navigate steps as needed, but slowly. Prior to hospitalization, patient was considered to be independent with ADLs/IADLS : no . If not independent,  patient needs assist with : ADLs    Patient has a current ACP document on file: no     Patient's family will be available to transport patient home upon discharge.    The patient already has a rollator, a shower chair and home O2 ([employer]), available in the home. Patient is not currently active with home health. She does, however, have personal care services, in the home. Patient has not stayed in a skilled nursing facility or rehab. This patient is on dialysis :no    Currently, the discharge plan is for patient to return home with help from her family and restarting her personal care services. Patient's family will transport home, at the time of discharge. Patient's daughter is (Hanna Marroquin, MQ#[phone] and UL#[phone]). Patient's sister is Nba Johnson, OB#[phone]). Patient's PCP is Dr. Nahid Castañeda. Patient is insured through Medicare [employer] and she also has [employer]. The patient states that she can obtain her medications from the pharmacy, and take her medications as directed. This writer will continue to monitor for discharge planning to ensure a safe discharge home from [facility]. Care Management Interventions  PCP Verified by CM: Yes(Dr. Nahid Castañeda)  Palliative Care Criteria Met (RRAT>21 & CHF Dx)?: No  Mode of Transport at Discharge: Other (see comment)(Family will transport.)  Transition of Care Consult (CM Consult): Discharge Planning  Current Support Network: Own Home, Family Lives Nearby(Daughter lives with her.)  Confirm Follow Up Transport: Family  Discharge Location  Discharge Placement: Home with family assistance        Chuy Betancourt MSW  Care Manager  Pager#: [phone]

## 2020-03-05 NOTE — CDMP QUERY
Patient admitted with COPD with acute exacerbation. Pt noted to have documented Insuline dependent Type 2 Diabetes Insuline dependent Type 2 Diabetes noted in H&P by Holly Diaz MD on 3/4/2020. Point-of-Care Tests on 3/5/2020 at 0838 reveals  and 3/5/2020 at 1132 is 311. Please document in progress notes and discharge summary further specificity regarding the control status of DM: 
 
? Uncontrolled DM with hyperglycemia 
? Other, please specify ? Clinically unable to determine ? The medical record reflects the following: 
 
  Risk Factors: PMH DM type 2 Clinical Indicators: Blood glucose 268, 311 Treatment: Receiving Humalog, lantus, Diabetic diet consistant carb Thank you, Kelin Malone RN, BSN, [address] [place] [address] 
[phone]

## 2020-03-06 LAB
ANION GAP SERPL CALC-SCNC: 5 MMOL/L (ref 3–18)
BASOPHILS # BLD: 0 K/UL (ref 0–0.1)
BASOPHILS NFR BLD: 0 % (ref 0–2)
BUN SERPL-MCNC: 22 MG/DL (ref 7–18)
BUN/CREAT SERPL: 18 (ref 12–20)
CALCIUM SERPL-MCNC: 8.9 MG/DL (ref 8.5–10.1)
CHLORIDE SERPL-SCNC: 101 MMOL/L (ref 100–111)
CO2 SERPL-SCNC: 29 MMOL/L (ref 21–32)
CREAT SERPL-MCNC: 1.19 MG/DL (ref 0.6–1.3)
DIFFERENTIAL METHOD BLD: ABNORMAL
EOSINOPHIL # BLD: 0 K/UL (ref 0–0.4)
EOSINOPHIL NFR BLD: 0 % (ref 0–5)
ERYTHROCYTE [DISTWIDTH] IN BLOOD BY AUTOMATED COUNT: 13.7 % (ref 11.6–14.5)
GLUCOSE BLD STRIP.AUTO-MCNC: 178 MG/DL (ref 70–110)
GLUCOSE BLD STRIP.AUTO-MCNC: 246 MG/DL (ref 70–110)
GLUCOSE BLD STRIP.AUTO-MCNC: 250 MG/DL (ref 70–110)
GLUCOSE BLD STRIP.AUTO-MCNC: 284 MG/DL (ref 70–110)
GLUCOSE SERPL-MCNC: 302 MG/DL (ref 74–99)
HCT VFR BLD AUTO: 36.9 % (ref 35–45)
HGB BLD-MCNC: 12.5 G/DL (ref 12–16)
INFLUENZA A PCR, 186222: NEGATIVE
INFLUENZA B PCR, 186223: NEGATIVE
LYMPHOCYTES # BLD: 0.9 K/UL (ref 0.9–3.6)
LYMPHOCYTES NFR BLD: 10 % (ref 21–52)
MCH RBC QN AUTO: 29.4 PG (ref 24–34)
MCHC RBC AUTO-ENTMCNC: 33.9 G/DL (ref 31–37)
MCV RBC AUTO: 86.8 FL (ref 74–97)
MONOCYTES # BLD: 0.1 K/UL (ref 0.05–1.2)
MONOCYTES NFR BLD: 1 % (ref 3–10)
NEUTS SEG # BLD: 8.1 K/UL (ref 1.8–8)
NEUTS SEG NFR BLD: 89 % (ref 40–73)
PLATELET # BLD AUTO: 286 K/UL (ref 135–420)
PMV BLD AUTO: 9.5 FL (ref 9.2–11.8)
POTASSIUM SERPL-SCNC: 4.6 MMOL/L (ref 3.5–5.5)
RBC # BLD AUTO: 4.25 M/UL (ref 4.2–5.3)
SODIUM SERPL-SCNC: 135 MMOL/L (ref 136–145)
WBC # BLD AUTO: 9.1 K/UL (ref 4.6–13.2)

## 2020-03-06 PROCEDURE — 74011636637 HC RX REV CODE- 636/637: Performed by: FAMILY MEDICINE

## 2020-03-06 PROCEDURE — 74011250637 HC RX REV CODE- 250/637: Performed by: STUDENT IN AN ORGANIZED HEALTH CARE EDUCATION/TRAINING PROGRAM

## 2020-03-06 PROCEDURE — 85025 COMPLETE CBC W/AUTO DIFF WBC: CPT

## 2020-03-06 PROCEDURE — 36415 COLL VENOUS BLD VENIPUNCTURE: CPT

## 2020-03-06 PROCEDURE — 74011636637 HC RX REV CODE- 636/637: Performed by: STUDENT IN AN ORGANIZED HEALTH CARE EDUCATION/TRAINING PROGRAM

## 2020-03-06 PROCEDURE — 94640 AIRWAY INHALATION TREATMENT: CPT

## 2020-03-06 PROCEDURE — 97535 SELF CARE MNGMENT TRAINING: CPT

## 2020-03-06 PROCEDURE — 77010033678 HC OXYGEN DAILY

## 2020-03-06 PROCEDURE — 74011000250 HC RX REV CODE- 250: Performed by: STUDENT IN AN ORGANIZED HEALTH CARE EDUCATION/TRAINING PROGRAM

## 2020-03-06 PROCEDURE — 80048 BASIC METABOLIC PNL TOTAL CA: CPT

## 2020-03-06 PROCEDURE — 82962 GLUCOSE BLOOD TEST: CPT

## 2020-03-06 PROCEDURE — 97165 OT EVAL LOW COMPLEX 30 MIN: CPT

## 2020-03-06 PROCEDURE — 74011250636 HC RX REV CODE- 250/636: Performed by: STUDENT IN AN ORGANIZED HEALTH CARE EDUCATION/TRAINING PROGRAM

## 2020-03-06 PROCEDURE — 65660000000 HC RM CCU STEPDOWN

## 2020-03-06 PROCEDURE — 94660 CPAP INITIATION&MGMT: CPT

## 2020-03-06 PROCEDURE — 74011000258 HC RX REV CODE- 258: Performed by: STUDENT IN AN ORGANIZED HEALTH CARE EDUCATION/TRAINING PROGRAM

## 2020-03-06 RX ORDER — INSULIN GLARGINE 100 [IU]/ML
12 INJECTION, SOLUTION SUBCUTANEOUS ONCE
Status: COMPLETED | OUTPATIENT
Start: 2020-03-06 | End: 2020-03-06

## 2020-03-06 RX ORDER — INSULIN GLARGINE 100 [IU]/ML
13 INJECTION, SOLUTION SUBCUTANEOUS ONCE
Status: COMPLETED | OUTPATIENT
Start: 2020-03-06 | End: 2020-03-06

## 2020-03-06 RX ORDER — INSULIN GLARGINE 100 [IU]/ML
13 INJECTION, SOLUTION SUBCUTANEOUS ONCE
Status: DISCONTINUED | OUTPATIENT
Start: 2020-03-06 | End: 2020-03-06

## 2020-03-06 RX ORDER — GUAIFENESIN 600 MG/1
600 TABLET, EXTENDED RELEASE ORAL EVERY 12 HOURS
Status: DISCONTINUED | OUTPATIENT
Start: 2020-03-06 | End: 2020-03-10 | Stop reason: HOSPADM

## 2020-03-06 RX ADMIN — Medication 81 MG: at 08:58

## 2020-03-06 RX ADMIN — PANTOPRAZOLE SODIUM 40 MG: 40 TABLET, DELAYED RELEASE ORAL at 08:58

## 2020-03-06 RX ADMIN — IPRATROPIUM BROMIDE 0.5 MG: 0.5 SOLUTION RESPIRATORY (INHALATION) at 14:34

## 2020-03-06 RX ADMIN — INSULIN GLARGINE 13 UNITS: 100 INJECTION, SOLUTION SUBCUTANEOUS at 21:15

## 2020-03-06 RX ADMIN — ARFORMOTEROL TARTRATE 15 MCG: 15 SOLUTION RESPIRATORY (INHALATION) at 07:57

## 2020-03-06 RX ADMIN — LISINOPRIL 20 MG: 20 TABLET ORAL at 18:36

## 2020-03-06 RX ADMIN — MONTELUKAST 10 MG: 10 TABLET, FILM COATED ORAL at 21:16

## 2020-03-06 RX ADMIN — GUAIFENESIN 600 MG: 600 TABLET, EXTENDED RELEASE ORAL at 02:12

## 2020-03-06 RX ADMIN — PIPERACILLIN SODIUM AND TAZOBACTAM SODIUM 4.5 G: 4; .5 INJECTION, POWDER, LYOPHILIZED, FOR SOLUTION INTRAVENOUS at 02:12

## 2020-03-06 RX ADMIN — BUDESONIDE 1000 MCG: 1 SUSPENSION RESPIRATORY (INHALATION) at 07:57

## 2020-03-06 RX ADMIN — ARFORMOTEROL TARTRATE 15 MCG: 15 SOLUTION RESPIRATORY (INHALATION) at 20:11

## 2020-03-06 RX ADMIN — INSULIN GLARGINE 12 UNITS: 100 INJECTION, SOLUTION SUBCUTANEOUS at 09:01

## 2020-03-06 RX ADMIN — INSULIN LISPRO 9 UNITS: 100 INJECTION, SOLUTION INTRAVENOUS; SUBCUTANEOUS at 21:15

## 2020-03-06 RX ADMIN — GUAIFENESIN 600 MG: 600 TABLET, EXTENDED RELEASE ORAL at 08:58

## 2020-03-06 RX ADMIN — IPRATROPIUM BROMIDE 0.5 MG: 0.5 SOLUTION RESPIRATORY (INHALATION) at 07:57

## 2020-03-06 RX ADMIN — INSULIN LISPRO 4 UNITS: 100 INJECTION, SOLUTION INTRAVENOUS; SUBCUTANEOUS at 12:52

## 2020-03-06 RX ADMIN — PIPERACILLIN AND TAZOBACTAM 3.38 G: 3; .375 INJECTION, POWDER, LYOPHILIZED, FOR SOLUTION INTRAVENOUS at 16:56

## 2020-03-06 RX ADMIN — INSULIN LISPRO 4 UNITS: 100 INJECTION, SOLUTION INTRAVENOUS; SUBCUTANEOUS at 09:01

## 2020-03-06 RX ADMIN — IPRATROPIUM BROMIDE 0.5 MG: 0.5 SOLUTION RESPIRATORY (INHALATION) at 20:11

## 2020-03-06 RX ADMIN — METHYLPREDNISOLONE SODIUM SUCCINATE 125 MG: 125 INJECTION, POWDER, FOR SOLUTION INTRAMUSCULAR; INTRAVENOUS at 18:36

## 2020-03-06 RX ADMIN — IPRATROPIUM BROMIDE 0.5 MG: 0.5 SOLUTION RESPIRATORY (INHALATION) at 04:26

## 2020-03-06 RX ADMIN — INSULIN LISPRO 2 UNITS: 100 INJECTION, SOLUTION INTRAVENOUS; SUBCUTANEOUS at 17:14

## 2020-03-06 RX ADMIN — GUAIFENESIN 600 MG: 600 TABLET, EXTENDED RELEASE ORAL at 21:16

## 2020-03-06 RX ADMIN — FLUTICASONE PROPIONATE 2 SPRAY: 50 SPRAY, METERED NASAL at 08:57

## 2020-03-06 RX ADMIN — HYDROCHLOROTHIAZIDE 12.5 MG: 25 TABLET ORAL at 08:58

## 2020-03-06 RX ADMIN — PIPERACILLIN SODIUM AND TAZOBACTAM SODIUM 4.5 G: 4; .5 INJECTION, POWDER, LYOPHILIZED, FOR SOLUTION INTRAVENOUS at 09:08

## 2020-03-06 RX ADMIN — PIPERACILLIN AND TAZOBACTAM 3.38 G: 3; .375 INJECTION, POWDER, LYOPHILIZED, FOR SOLUTION INTRAVENOUS at 21:15

## 2020-03-06 RX ADMIN — BUDESONIDE 1000 MCG: 1 SUSPENSION RESPIRATORY (INHALATION) at 20:11

## 2020-03-06 RX ADMIN — ENOXAPARIN SODIUM 40 MG: 40 INJECTION SUBCUTANEOUS at 08:57

## 2020-03-06 NOTE — PROGRESS NOTES
Problem: Falls - Risk of  Goal: *Absence of Falls  Description  Document Beryl Ruano Fall Risk and appropriate interventions in the flowsheet. Outcome: Progressing Towards Goal  Note: Fall Risk Interventions:  Mobility Interventions: Utilize walker, cane, or other assistive device         Medication Interventions: Evaluate medications/consider consulting pharmacy         History of Falls Interventions: Evaluate medications/consider consulting pharmacy         Problem: Patient Education: Go to Patient Education Activity  Goal: Patient/Family Education  Outcome: Progressing Towards Goal     Problem: Chronic Obstructive Pulmonary Disease (COPD)  Goal: *Oxygen saturation during activity within specified parameters  Outcome: Progressing Towards Goal  Goal: *Able to remain out of bed as prescribed  Outcome: Progressing Towards Goal  Goal: *Absence of hypoxia  Outcome: Progressing Towards Goal  Goal: *Optimize nutritional status  Outcome: Progressing Towards Goal     Problem: Patient Education: Go to Patient Education Activity  Goal: Patient/Family Education  Outcome: Progressing Towards Goal     Problem: Pain  Goal: *Control of Pain  Outcome: Progressing Towards Goal  Goal: *PALLIATIVE CARE:  Alleviation of Pain  Outcome: Progressing Towards Goal     Problem: Diabetes Self-Management  Goal: *Disease process and treatment process  Description  Define diabetes and identify own type of diabetes; list 3 options for treating diabetes. Outcome: Progressing Towards Goal  Goal: *Incorporating nutritional management into lifestyle  Description  Describe effect of type, amount and timing of food on blood glucose; list 3 methods for planning meals. Outcome: Progressing Towards Goal  Goal: *Incorporating physical activity into lifestyle  Description  State effect of exercise on blood glucose levels.   Outcome: Progressing Towards Goal  Goal: *Developing strategies to promote health/change behavior  Description  Define the ABC's of diabetes; identify appropriate screenings, schedule and personal plan for screenings. Outcome: Progressing Towards Goal  Goal: *Using medications safely  Description  State effect of diabetes medications on diabetes; name diabetes medication taking, action and side effects. Outcome: Progressing Towards Goal  Goal: *Monitoring blood glucose, interpreting and using results  Description  Identify recommended blood glucose targets  and personal targets. Outcome: Progressing Towards Goal  Goal: *Prevention, detection, treatment of acute complications  Description  List symptoms of hyper- and hypoglycemia; describe how to treat low blood sugar and actions for lowering  high blood glucose level. Outcome: Progressing Towards Goal  Goal: *Prevention, detection and treatment of chronic complications  Description  Define the natural course of diabetes and describe the relationship of blood glucose levels to long term complications of diabetes.   Outcome: Progressing Towards Goal  Goal: *Developing strategies to address psychosocial issues  Description  Describe feelings about living with diabetes; identify support needed and support network  Outcome: Progressing Towards Goal  Goal: *Insulin pump training  Outcome: Progressing Towards Goal  Goal: *Sick day guidelines  Outcome: Progressing Towards Goal  Goal: *Patient Specific Goal (EDIT GOAL, INSERT TEXT)  Outcome: Progressing Towards Goal     Problem: Patient Education: Go to Patient Education Activity  Goal: Patient/Family Education  Outcome: Progressing Towards Goal     Problem: Patient Education: Go to Patient Education Activity  Goal: Patient/Family Education  Outcome: Progressing Towards Goal     Problem: Patient Education: Go to Patient Education Activity  Goal: Patient/Family Education  Outcome: Progressing Towards Goal

## 2020-03-06 NOTE — PROGRESS NOTES
Problem: Self Care Deficits Care Plan (Adult)  Goal: *Acute Goals and Plan of Care (Insert Text)  Outcome: Resolved/Met     OCCUPATIONAL THERAPY EVALUATION/DISCHARGE    Patient: Bill Schmitz (89 y.o. female)  Date: 3/6/2020  Primary Diagnosis: COPD with acute exacerbation ([facility] [address] [J44.1]  COPD exacerbation ([facility] [address] [J44.1]  Precautions:  Fall  PLOF: Patient was modified independent with self-care and functional mobility PTA. Patient has an aide that comes for 6 hours, 5x/week and all DME. ASSESSMENT AND RECOMMENDATIONS:  Upon entering the room, pt was seated edge of bed reading, alert, and agreeable to participate in OT evaluation. Patient educated on role of OT and evaluation process with patient verbalizing understanding as she is familiar with OT. Patient is  Modified independent with basic self-care using AE prn and Supervision with bed mobility and functional transfers. Patient educated on energy conservation techniques, pursed lip breathing, and self pacing during daily activities. Patient issued reacher, long handled sponge, and long handled shoe horn this session to assist with self-care tasks \"on a bad day\". Based on the objective data described below, the patient presents with no deficits that impede pt function with ADLs, functional transfers, and functional mobility. At this time patient is safe to d/c home with family support. OT to d/c from caseload at this time. Skilled occupational therapy is not indicated at this time. Discharge Recommendations: None  Further Equipment Recommendations for Discharge: Patient has all DME     SUBJECTIVE:   Patient stated I dont let this get me down. You have to do what you can or it will get the best of you. I'm active, I have a trip to [place] and [place] planned for the next 2 years.  and \"I am thankful for OT because you guys have helped me find ways to conserve my energy during the years.  You really dont know how much energy bathing can take out of you until you have COPD\"    OBJECTIVE DATA SUMMARY:     Past Medical History:   Diagnosis Date    Asthma     Chronic lung disease     COPD     Cystocele, midline     Diabetes mellitus ([facility] [address]     GERD (gastroesophageal reflux disease)     Hidradenitis suppurativa     Hyperlipidemia     Hypertension     SHERRIE on CPAP     CPAP    Stress incontinence      Past Surgical History:   Procedure Laterality Date    BREAST SURGERY PROCEDURE UNLISTED      Right breast benign tumor removal    HX APPENDECTOMY      HX CHOLECYSTECTOMY      HX DILATION AND CURETTAGE      Dysfunctional uterine bleeding, thought 2/2 fibroids    HX TUBAL LIGATION       Barriers to Learning/Limitations: None  Compensate with: visual, verbal, tactile, kinesthetic cues/model    Home Situation:   Home Situation  Home Environment: Private residence  # Steps to Enter: 2  Rails to Enter: No  One/Two Story Residence: Two story(bedroom on 1st floor, bathroom upstairs)  # of Interior Steps: 14  Interior Rails: Right  Living Alone: No  Support Systems: Child(kim), Family member(s)  Patient Expects to be Discharged to[de-identified] Private residence  Current DME Used/Available at Home: Nebulizer, Oxygen, portable, Walker, rollator, Walker, rolling, Commode, bedside, Shower chair  Tub or Shower Type: Tub/Shower combination  [x]     Right hand dominant   []     Left hand dominant    Cognitive/Behavioral Status:  Neurologic State: Alert  Orientation Level: Oriented X4  Cognition: Follows commands  Safety/Judgement: Fall prevention; Awareness of environment    Skin: Intact  Edema: None noted    Vision/Perceptual:    Acuity: Within Defined Limits;Able to read normal print without difficulty      Coordination: BUE  Fine Motor Skills-Upper: Left Intact; Right Intact    Gross Motor Skills-Upper: Left Intact; Right Intact    Balance:  Sitting: Intact  Standing: Intact    Strength: BUE  Strength: Generally decreased, functional    Tone & Sensation: BUE  Tone: Normal  Sensation: Intact    Range of Motion: BUE  AROM: Within functional limits    Functional Mobility and Transfers for ADLs:  Bed Mobility:  Supine to Sit: Supervision  Sit to Supine: Supervision  Scooting: Supervision     Transfers:  Sit to Stand: Supervision  Stand to Sit: Supervision    ADL Assessment:  Feeding: Modified independent    Oral Facial Hygiene/Grooming: Modified Independent    Bathing: Modified independent; Adaptive equipment; Additional time    Upper Body Dressing: Independent    Lower Body Dressing: Modified independent; Adaptive equipment    Toileting: Modified independent    ADL Intervention:  Upper Body Dressing Assistance  Dressing Assistance: Modified independent  Pullover Shirt: Modified independent    Lower Body Dressing Assistance  Dressing Assistance: Modified independent  Pants With Elastic Waist: Modified independent(simulation tailor sitting)  Socks: (patient wears slip ons shoes not socks)  Leg Crossed Method Used: Yes  Position Performed: Seated edge of bed    Cognitive Retraining  Safety/Judgement: Fall prevention; Awareness of environment    Pain:  Pain level pre-treatment: 0/10   Pain Intervention(s): Medication (see MAR); Response to intervention:  See doc flow    Activity Tolerance:   Good    Please refer to the flowsheet for vital signs taken during this treatment. After treatment:   []  Patient left in no apparent distress sitting up in chair  [x]  Patient left in no apparent distress in bed  [x]  Call bell left within reach  [x]  Nursing notified  []  Caregiver present  []  Bed alarm activated    COMMUNICATION/EDUCATION:   [x]      Role of Occupational Therapy in the acute care setting  [x]      Home safety education was provided and the patient/caregiver indicated understanding. [x]      Patient/family have participated as able and agree with findings and recommendations. []      Patient is unable to participate in plan of care at this time.     Thank you for this referral.  Mani Bishop OTR/L  Time Calculation: 23 mins      Eval Complexity: History: MEDIUM Complexity : Expanded review of history including physical, cognitive and psychosocial  history ; Examination: LOW Complexity : 1-3 performance deficits relating to physical, cognitive , or psychosocial skils that result in activity limitations and / or participation restrictions ;    Decision Making:LOW Complexity : No comorbidities that affect functional and no verbal or physical assistance needed to complete eval tasks

## 2020-03-06 NOTE — DIABETES MGMT
GLYCEMIC CONTROL PLAN OF CARE     Assessment/Recommendations:  Fasting lab glucose this am 302 mg/dl  Noted Lantus dose split for today to get pt back on a nighttime schedule  Continue corrective insulin coverage as needed  Will advance to very insulin resistant dosing  Will continue inpatient monitoring. Most recent blood glucose values:  Results for Vandana Zimmer (MRN [id number]) as of 3/6/2020 13:30   Ref. Range 3/5/2020 11:32 3/5/2020 17:00 3/5/2020 19:53 3/6/2020 08:31 3/6/2020 12:14   GLUCOSE,FAST - POC Latest Ref Range: 70 - 110 mg/dL 311 (H) 196 (H) 227 (H) 250 (H) 246 (H)     Current A1C of 8.2 % is equivalent to average blood glucose of 189 mg/dl over the past 2-3 months.     Current hospital diabetes medications:   lantus 12 units this am  Lantus 13 units this evening  Lispro corrective insulin coverage AC&HS as needed  Previous day's insulin requirements:   lantus 25 units  Lispro 20 units corrective insulin coverage  Home diabetes medications:  Lantus insulin 35 units nightly  Novolog insulin per sliding scale   Diet:    Diabetic consistent carb  Education:  __x_Refer to Diabetes Education Record 3/5/2020            ____Education not indicated at this time      Fabian Judd, [facility] CDE  Ext [phone]

## 2020-03-06 NOTE — ROUTINE PROCESS
Bedside and Verbal shift change report given to Maisha (oncoming nurse) by [facility] (offgoing nurse). Report included the following information SBAR, Kardex, Intake/Output, MAR, Recent Results, Cardiac Rhythm , and Alarm Parameters . 1630- Patient BP noted to be 116/48 with repeat of 100/56 Dr Himanshu Cast family was paged. Orders made at this time is to continue monitoring.

## 2020-03-06 NOTE — PROGRESS NOTES
Brief Progress Note    Subjective:    Pt sitting at bedside this evening, reading on phone. Pt stated that she feels much better and her breathing is improved since last evening. Feeling much less dyspneic at rest, still with sig CHAUDHARY. She reports she is coughing and feels like the congestion is stuck in her chest and she cant get it out. Pt reports she is tolerating PO intake well. ROS: no HA/CP/no abdo pain or n/v/d/c, no dysuria/frequency, +cough/SOB/CHAUDHARY    Objective:  Patient Vitals for the past 4 hrs:   Temp Pulse Resp BP SpO2   03/06/20 0006 98.2 °F (36.8 °C) 91 18 136/74 96 %       PE:  General: well, appropriately responsive, NAD  Cardio:RRR  Resp: no increased WOB, CTA w/ improved airflo from admission, on 2l NC  Neuro: A&Ox3    Assessment/Plan:    Pt off bipap today on 2L NC this evening. Breathing is much improved from admission. Will add mucinex this evening to help loosen up chest congestion.      For full assessment and plan see AM intern note    Logan Mancilla MD, PGY-1  [facility]

## 2020-03-06 NOTE — PROGRESS NOTES
Problem: Falls - Risk of  Goal: *Absence of Falls  Description  Document Tiny Mckinney Fall Risk and appropriate interventions in the flowsheet. Outcome: Progressing Towards Goal  Note: Fall Risk Interventions:  Mobility Interventions: Utilize walker, cane, or other assistive device         Medication Interventions: Evaluate medications/consider consulting pharmacy         History of Falls Interventions: Evaluate medications/consider consulting pharmacy         Problem: Patient Education: Go to Patient Education Activity  Goal: Patient/Family Education  Outcome: Progressing Towards Goal     Problem: Chronic Obstructive Pulmonary Disease (COPD)  Goal: *Oxygen saturation during activity within specified parameters  Outcome: Progressing Towards Goal  Goal: *Able to remain out of bed as prescribed  Outcome: Progressing Towards Goal  Goal: *Absence of hypoxia  Outcome: Progressing Towards Goal  Goal: *Optimize nutritional status  Outcome: Progressing Towards Goal     Problem: Patient Education: Go to Patient Education Activity  Goal: Patient/Family Education  Outcome: Progressing Towards Goal     Problem: Pain  Goal: *Control of Pain  Outcome: Progressing Towards Goal  Goal: *PALLIATIVE CARE:  Alleviation of Pain  Outcome: Progressing Towards Goal     Problem: Diabetes Self-Management  Goal: *Disease process and treatment process  Description  Define diabetes and identify own type of diabetes; list 3 options for treating diabetes. Outcome: Progressing Towards Goal  Goal: *Incorporating nutritional management into lifestyle  Description  Describe effect of type, amount and timing of food on blood glucose; list 3 methods for planning meals. Outcome: Progressing Towards Goal  Goal: *Incorporating physical activity into lifestyle  Description  State effect of exercise on blood glucose levels.   Outcome: Progressing Towards Goal  Goal: *Developing strategies to promote health/change behavior  Description  Define the ABC's of diabetes; identify appropriate screenings, schedule and personal plan for screenings. Outcome: Progressing Towards Goal  Goal: *Using medications safely  Description  State effect of diabetes medications on diabetes; name diabetes medication taking, action and side effects. Outcome: Progressing Towards Goal  Goal: *Monitoring blood glucose, interpreting and using results  Description  Identify recommended blood glucose targets  and personal targets. Outcome: Progressing Towards Goal  Goal: *Prevention, detection, treatment of acute complications  Description  List symptoms of hyper- and hypoglycemia; describe how to treat low blood sugar and actions for lowering  high blood glucose level. Outcome: Progressing Towards Goal  Goal: *Prevention, detection and treatment of chronic complications  Description  Define the natural course of diabetes and describe the relationship of blood glucose levels to long term complications of diabetes.   Outcome: Progressing Towards Goal  Goal: *Developing strategies to address psychosocial issues  Description  Describe feelings about living with diabetes; identify support needed and support network  Outcome: Progressing Towards Goal  Goal: *Insulin pump training  Outcome: Progressing Towards Goal  Goal: *Sick day guidelines  Outcome: Progressing Towards Goal  Goal: *Patient Specific Goal (EDIT GOAL, INSERT TEXT)  Outcome: Progressing Towards Goal     Problem: Patient Education: Go to Patient Education Activity  Goal: Patient/Family Education  Outcome: Progressing Towards Goal     Problem: Patient Education: Go to Patient Education Activity  Goal: Patient/Family Education  Outcome: Progressing Towards Goal

## 2020-03-06 NOTE — PROGRESS NOTES
[facility]  Intern Progress Note    Patient: Lynda Marhsall MRN: [id number]   SSN: [id number]  YOB: 1960   Age: 60 y.o. Sex: female      Admit Date: 3/4/2020    LOS: 2 days   Chief Complaint   Patient presents with    Cough    Shortness of Breath       Subjective:     Patient feeling better this morning. States breathing is steadily improving. States she feels like her chest is loosening up. Mucinex started last night for cough/sputum which she states is helping to break up some of her congestion. Concerned about her insulin- has been getting it in the morning, and at home she usually takes her insulin at night. ROS:   Denies:   - fevers/chill  - CP  - N/V/diarrhea or abdominal pain     Objective:     PE:  - General: patient is in no acute distress  - Cv: RRR, no murmurs/gallops/rubs, peripheral pulses intact  - Resp: Breathing comfortably on bipap, speaking in full sentences. Lungs with decreased air entry especially in bases. No wheezing or rales. - Abdominal: Soft, non-tender, non-distended, Bs+   - MSK: trace swelling in extermities bilaterally    Vitals:   Patient Vitals for the past 24 hrs:   Temp Pulse Resp BP SpO2   03/06/20 0426 -- -- -- -- 95 %   03/06/20 0420 97.9 °F (36.6 °C) 64 16 134/56 96 %   03/06/20 0006 98.2 °F (36.8 °C) 91 18 136/74 96 %   03/05/20 2035 -- -- -- -- 96 %   03/05/20 2001 97.3 °F (36.3 °C) 98 17 131/60 95 %   03/05/20 1800 -- -- -- 130/59 --   03/05/20 1701 98.3 °F (36.8 °C) 83 14 130/51 97 %   03/05/20 1600 98.3 °F (36.8 °C) 83 15 130/55 98 %   03/05/20 1134 96.7 °F (35.9 °C) 84 17 132/60 97 %         Intake/Output Summary (Last 24 hours) at 3/6/2020 0928  Last data filed at 3/6/2020 4929  Gross per 24 hour   Intake 780 ml   Output 2200 ml   Net -1420 ml       Labs reviewed.    Pertinent labs: Cr 1.19    Assessment/Plan:     Lauri De Dios is a 60 y.o. female with PMH of COPD on home O2, IDDM2, HTN, HFpEF, SHERRIE on CPAP, GERD, allergic rhinitis, now admitted for COPD exacerbation.      COPD exacerbation: Acute on Chronic Respiratory Failure with hypercapnia   Has hx of asthma/COPD on 2L home 02. Followed by Dr. David Grove in OP. Per last pulm note pt may have environmental triggers. At home takes prednisone 10mg every other day, albuterol neb, spiriva, trelegy nightly. Afebrile, no leukocytosis on admission. . CXR with hyperinflation but no concern for infection. Patient has been on BiPAP overnight, was on 2L yesterday throughout the day. States her breathing is improving. VSS. Labs looking great. - Continue Bipap PRN, oxygen as needed, goal 88-92%  - continue brovana/pulmicort  - continue atrovent nebs q6h  - continue Mucinex  - continue home singulair  - FU influenza PCR  - Continue Solumedrol  - Protonix for SUP  - Continue zosyn      SHERRIE/pulm htn  Intolerant to Cpap. On trelegy nightly. - tolerating BiPAP here   - continue BIpap at night      Insuline dependent Type 2 Diabetes  Home lantus 35u and novolog SSI.  on admission. Fasting blood sugar this morning 250. Likely exacerbated by steroids.   -Lantas 25U and increase as needed   -SSI  - poc glucose achs  - diabetic diet     Hypertension, HFpEF  Stable.  Echo 7/7/18 EF 66-70%, LV mild concentric hypertrophy, No left regional wall motion abnorm. 's-150's this morning.   -continue home lisinopril 20mg bid, HCTZ 12.5mg     GERD  Takes omeprazole 40mg qD and pepcid for breakthrough symptoms.   -continue protonix 40mg qD     Allergic Rhinitis  -home flonase, singulair 10mg     Diet: npo, diabetic diet when cleared  DVT Prophylaxis: lovenox  Code Status: Full Code  Point of [address], daughter, [phone]     Disposition and anticipated LOS:  2 midnights    Signed By: Kenneth Ohara MD     March 6, 2020

## 2020-03-06 NOTE — ROUTINE PROCESS
1915 Assumed care of patient from [facility] (off going). Patient resting in bed with no distress. Bed in lowest position, side rails up x3, call bell and personal belongings within reach. Will continue to monitor. 0715 Bedside shift change report given to Lakia Nicole RN (oncoming nurse) by Caren Bergman (offgoing nurse). Report included the following information SBAR, Kardex, Intake/Output, MAR, Recent Results and Cardiac Rhythm NSR on monitor.

## 2020-03-07 LAB
ANION GAP SERPL CALC-SCNC: 6 MMOL/L (ref 3–18)
BASOPHILS # BLD: 0 K/UL (ref 0–0.1)
BASOPHILS NFR BLD: 0 % (ref 0–2)
BUN SERPL-MCNC: 24 MG/DL (ref 7–18)
BUN/CREAT SERPL: 19 (ref 12–20)
CALCIUM SERPL-MCNC: 9 MG/DL (ref 8.5–10.1)
CHLORIDE SERPL-SCNC: 101 MMOL/L (ref 100–111)
CO2 SERPL-SCNC: 28 MMOL/L (ref 21–32)
CREAT SERPL-MCNC: 1.29 MG/DL (ref 0.6–1.3)
DIFFERENTIAL METHOD BLD: ABNORMAL
EOSINOPHIL # BLD: 0 K/UL (ref 0–0.4)
EOSINOPHIL NFR BLD: 0 % (ref 0–5)
ERYTHROCYTE [DISTWIDTH] IN BLOOD BY AUTOMATED COUNT: 13.6 % (ref 11.6–14.5)
GLUCOSE BLD STRIP.AUTO-MCNC: 204 MG/DL (ref 70–110)
GLUCOSE BLD STRIP.AUTO-MCNC: 232 MG/DL (ref 70–110)
GLUCOSE BLD STRIP.AUTO-MCNC: 262 MG/DL (ref 70–110)
GLUCOSE BLD STRIP.AUTO-MCNC: 314 MG/DL (ref 70–110)
GLUCOSE SERPL-MCNC: 410 MG/DL (ref 74–99)
HCT VFR BLD AUTO: 37.7 % (ref 35–45)
HGB BLD-MCNC: 12.7 G/DL (ref 12–16)
LYMPHOCYTES # BLD: 0.9 K/UL (ref 0.9–3.6)
LYMPHOCYTES NFR BLD: 10 % (ref 21–52)
MCH RBC QN AUTO: 29.3 PG (ref 24–34)
MCHC RBC AUTO-ENTMCNC: 33.7 G/DL (ref 31–37)
MCV RBC AUTO: 87.1 FL (ref 74–97)
MONOCYTES # BLD: 0.2 K/UL (ref 0.05–1.2)
MONOCYTES NFR BLD: 2 % (ref 3–10)
NEUTS SEG # BLD: 8.4 K/UL (ref 1.8–8)
NEUTS SEG NFR BLD: 88 % (ref 40–73)
PLATELET # BLD AUTO: 318 K/UL (ref 135–420)
PMV BLD AUTO: 9.5 FL (ref 9.2–11.8)
POTASSIUM SERPL-SCNC: 4.1 MMOL/L (ref 3.5–5.5)
RBC # BLD AUTO: 4.33 M/UL (ref 4.2–5.3)
SODIUM SERPL-SCNC: 135 MMOL/L (ref 136–145)
WBC # BLD AUTO: 9.5 K/UL (ref 4.6–13.2)

## 2020-03-07 PROCEDURE — 74011250636 HC RX REV CODE- 250/636: Performed by: STUDENT IN AN ORGANIZED HEALTH CARE EDUCATION/TRAINING PROGRAM

## 2020-03-07 PROCEDURE — 74011636637 HC RX REV CODE- 636/637: Performed by: FAMILY MEDICINE

## 2020-03-07 PROCEDURE — 74011636637 HC RX REV CODE- 636/637: Performed by: STUDENT IN AN ORGANIZED HEALTH CARE EDUCATION/TRAINING PROGRAM

## 2020-03-07 PROCEDURE — 94640 AIRWAY INHALATION TREATMENT: CPT

## 2020-03-07 PROCEDURE — 74011250637 HC RX REV CODE- 250/637: Performed by: STUDENT IN AN ORGANIZED HEALTH CARE EDUCATION/TRAINING PROGRAM

## 2020-03-07 PROCEDURE — 82962 GLUCOSE BLOOD TEST: CPT

## 2020-03-07 PROCEDURE — 74011000250 HC RX REV CODE- 250: Performed by: STUDENT IN AN ORGANIZED HEALTH CARE EDUCATION/TRAINING PROGRAM

## 2020-03-07 PROCEDURE — 65660000000 HC RM CCU STEPDOWN

## 2020-03-07 PROCEDURE — 80048 BASIC METABOLIC PNL TOTAL CA: CPT

## 2020-03-07 PROCEDURE — 85025 COMPLETE CBC W/AUTO DIFF WBC: CPT

## 2020-03-07 PROCEDURE — 36415 COLL VENOUS BLD VENIPUNCTURE: CPT

## 2020-03-07 PROCEDURE — 77010033678 HC OXYGEN DAILY

## 2020-03-07 PROCEDURE — 74011000258 HC RX REV CODE- 258: Performed by: STUDENT IN AN ORGANIZED HEALTH CARE EDUCATION/TRAINING PROGRAM

## 2020-03-07 PROCEDURE — 94660 CPAP INITIATION&MGMT: CPT

## 2020-03-07 PROCEDURE — 94761 N-INVAS EAR/PLS OXIMETRY MLT: CPT

## 2020-03-07 RX ORDER — AMOXICILLIN AND CLAVULANATE POTASSIUM 875; 125 MG/1; MG/1
1 TABLET, FILM COATED ORAL EVERY 12 HOURS
Status: DISCONTINUED | OUTPATIENT
Start: 2020-03-08 | End: 2020-03-10 | Stop reason: HOSPADM

## 2020-03-07 RX ORDER — PREDNISONE 20 MG/1
60 TABLET ORAL EVERY 24 HOURS
Status: DISCONTINUED | OUTPATIENT
Start: 2020-03-07 | End: 2020-03-10 | Stop reason: HOSPADM

## 2020-03-07 RX ORDER — INSULIN GLARGINE 100 [IU]/ML
25 INJECTION, SOLUTION SUBCUTANEOUS DAILY
Status: DISCONTINUED | OUTPATIENT
Start: 2020-03-07 | End: 2020-03-08

## 2020-03-07 RX ADMIN — PIPERACILLIN AND TAZOBACTAM 3.38 G: 3; .375 INJECTION, POWDER, LYOPHILIZED, FOR SOLUTION INTRAVENOUS at 16:59

## 2020-03-07 RX ADMIN — INSULIN LISPRO 12 UNITS: 100 INJECTION, SOLUTION INTRAVENOUS; SUBCUTANEOUS at 06:51

## 2020-03-07 RX ADMIN — ENOXAPARIN SODIUM 40 MG: 40 INJECTION SUBCUTANEOUS at 08:26

## 2020-03-07 RX ADMIN — MONTELUKAST 10 MG: 10 TABLET, FILM COATED ORAL at 21:39

## 2020-03-07 RX ADMIN — PIPERACILLIN AND TAZOBACTAM 3.38 G: 3; .375 INJECTION, POWDER, LYOPHILIZED, FOR SOLUTION INTRAVENOUS at 10:47

## 2020-03-07 RX ADMIN — PIPERACILLIN AND TAZOBACTAM 3.38 G: 3; .375 INJECTION, POWDER, LYOPHILIZED, FOR SOLUTION INTRAVENOUS at 03:29

## 2020-03-07 RX ADMIN — BUDESONIDE 1000 MCG: 1 SUSPENSION RESPIRATORY (INHALATION) at 08:53

## 2020-03-07 RX ADMIN — Medication 81 MG: at 08:25

## 2020-03-07 RX ADMIN — PREDNISONE 60 MG: 20 TABLET ORAL at 18:52

## 2020-03-07 RX ADMIN — INSULIN LISPRO 9 UNITS: 100 INJECTION, SOLUTION INTRAVENOUS; SUBCUTANEOUS at 12:25

## 2020-03-07 RX ADMIN — GUAIFENESIN 600 MG: 600 TABLET, EXTENDED RELEASE ORAL at 08:26

## 2020-03-07 RX ADMIN — INSULIN LISPRO 6 UNITS: 100 INJECTION, SOLUTION INTRAVENOUS; SUBCUTANEOUS at 21:37

## 2020-03-07 RX ADMIN — IPRATROPIUM BROMIDE 0.5 MG: 0.5 SOLUTION RESPIRATORY (INHALATION) at 14:15

## 2020-03-07 RX ADMIN — GUAIFENESIN 600 MG: 600 TABLET, EXTENDED RELEASE ORAL at 21:38

## 2020-03-07 RX ADMIN — ARFORMOTEROL TARTRATE 15 MCG: 15 SOLUTION RESPIRATORY (INHALATION) at 08:53

## 2020-03-07 RX ADMIN — LISINOPRIL 20 MG: 20 TABLET ORAL at 18:51

## 2020-03-07 RX ADMIN — INSULIN LISPRO 6 UNITS: 100 INJECTION, SOLUTION INTRAVENOUS; SUBCUTANEOUS at 17:00

## 2020-03-07 RX ADMIN — IPRATROPIUM BROMIDE 0.5 MG: 0.5 SOLUTION RESPIRATORY (INHALATION) at 01:25

## 2020-03-07 RX ADMIN — HYDROCHLOROTHIAZIDE 12.5 MG: 25 TABLET ORAL at 08:25

## 2020-03-07 RX ADMIN — INSULIN GLARGINE 25 UNITS: 100 INJECTION, SOLUTION SUBCUTANEOUS at 10:47

## 2020-03-07 RX ADMIN — PANTOPRAZOLE SODIUM 40 MG: 40 TABLET, DELAYED RELEASE ORAL at 08:26

## 2020-03-07 RX ADMIN — IPRATROPIUM BROMIDE 0.5 MG: 0.5 SOLUTION RESPIRATORY (INHALATION) at 08:53

## 2020-03-07 RX ADMIN — LISINOPRIL 20 MG: 20 TABLET ORAL at 08:25

## 2020-03-07 RX ADMIN — FLUTICASONE PROPIONATE 2 SPRAY: 50 SPRAY, METERED NASAL at 09:08

## 2020-03-07 RX ADMIN — PIPERACILLIN AND TAZOBACTAM 3.38 G: 3; .375 INJECTION, POWDER, LYOPHILIZED, FOR SOLUTION INTRAVENOUS at 21:39

## 2020-03-07 NOTE — PROGRESS NOTES
[facility]  Intern Progress Note    Patient: Alberto Root MRN: [id number]   SSN: [id number]  YOB: 1960   Age: 60 y.o. Sex: female      Admit Date: 3/4/2020    LOS: 3 days   Chief Complaint   Patient presents with    Cough    Shortness of Breath       Subjective:     Patient feeling well AM. States breathing is still improving. States she feels like her chest is loosening up, still not able to cough anything up. Was able to get up and walk around with extreme fatigue, still some CHAUDHARY. Feels that she is not ready to go home yet but will be tomorrow. ROS:   Denies:   - fevers/chill  - CP  - N/V/diarrhea or abdominal pain     Objective:     PE:  - General: patient is in no acute distress  - Cv: RRR, no murmurs/gallops/rubs, peripheral pulses intact  - Resp: Breathing comfortably on bipap, speaking in full sentences. Lungs with decreased air entry especially in bases. No wheezing or rales.    - Abdominal: Soft, non-tender, non-distended, Bs+   - MSK: trace swelling in extermities bilaterally    Vitals:   Patient Vitals for the past 24 hrs:   Temp Pulse Resp BP SpO2   03/07/20 0854 -- -- -- -- 98 %   03/07/20 0802 97.8 °F (36.6 °C) 78 18 148/75 96 %   03/07/20 0409 97.9 °F (36.6 °C) 74 16 144/73 94 %   03/07/20 0126 -- -- -- -- 98 %   03/07/20 0029 97.7 °F (36.5 °C) 70 16 109/62 94 %   03/06/20 2012 -- -- -- -- 98 %   03/06/20 2001 97.9 °F (36.6 °C) (!) 56 18 148/69 98 %   03/06/20 1600 97 °F (36.1 °C) 64 19 116/48 100 %   03/06/20 1200 97 °F (36.1 °C) (!) 52 18 143/72 99 %         Intake/Output Summary (Last 24 hours) at 3/7/2020 8196  Last data filed at 3/7/2020 0409  Gross per 24 hour   Intake 900 ml   Output 900 ml   Net 0 ml       Assessment/Plan:     Franklin De Dios is a 60 y.o. female with PMH of COPD on home O2, IDDM2, HTN, HFpEF, SHERRIE on CPAP, GERD, allergic rhinitis, now admitted for COPD exacerbation.      COPD exacerbation: Acute on Chronic Respiratory Failure with hypercapnia   Has hx of asthma/COPD on 2L home 02. Followed by Dr. Genevieve Brooks in [place]. Per last pulm note pt may have environmental triggers. At home takes prednisone 10mg every other day, albuterol neb, spiriva, trelegy nightly. Afebrile, no leukocytosis on admission. . CXR with hyperinflation but no concern for infection. Patient has been on BiPAP overnight, was on 2L yesterday throughout the day. States her breathing is improving. VSS. Labs looking great. - Continue Bipap PRN, oxygen as needed, goal 88-92%  - continue brovana/pulmicort  - continue atrovent nebs q6h  - continue Mucinex  - continue home singulair  - Continue Solumedrol  - Protonix for SUP  - Continue zosyn      SHERRIE/pulm htn  Intolerant to Cpap. On trelegy nightly. - tolerating BiPAP here   - continue BIpap at night      Insuline dependent Type 2 Diabetes  Home lantus 35u and novolog SSI.  on admission. Fasting blood sugar this morning 250. Likely exacerbated by steroids.   -Lantas 25U and increase as needed   -SSI  - poc glucose achs  - diabetic diet     Hypertension, HFpEF  Stable.  Echo 7/7/18 EF 66-70%, LV mild concentric hypertrophy, No left regional wall motion abnorm. 's-150's this morning.   -continue home lisinopril 20mg bid, HCTZ 12.5mg     GERD  Takes omeprazole 40mg qD and pepcid for breakthrough symptoms.   -continue protonix 40mg qD     Allergic Rhinitis  -home flonase, singulair 10mg     Diet: diabetic diet when cleared  DVT Prophylaxis: lovenox  Code Status: Full Code  [place] of [address], daughter, [phone]     Disposition and anticipated LOS:  2 midnights    Signed By: Asuncion Manuel MD     March 7, 2020

## 2020-03-07 NOTE — ROUTINE PROCESS
[address] care of patient from [facility] (off going). Patient resting in bed with no distress. Bed in lowest position, side rails up x3, call bell and personal belongings within reach. Will continue to monitor. 0223 Bedside shift change report given to [address] [place] [address] (oncoming nurse) by Yesenia Pride (offgoing nurse). Report included the following information SBAR, Kardex, Intake/Output, MAR, Recent Results and Cardiac Rhythm SB on monitor.

## 2020-03-07 NOTE — ROUTINE PROCESS
Bedside and Verbal shift change report given to [address] [place] [address] (oncoming nurse) by Chas Rizo (offgoing nurse). Report included the following information SBAR, Kardex, Intake/Output, MAR and Alarm Parameters .

## 2020-03-07 NOTE — PROGRESS NOTES
Problem: Falls - Risk of  Goal: *Absence of Falls  Description  Document Nereyda Cespedes Fall Risk and appropriate interventions in the flowsheet. Outcome: Progressing Towards Goal  Note: Fall Risk Interventions:  Mobility Interventions: Utilize walker, cane, or other assistive device         Medication Interventions: Evaluate medications/consider consulting pharmacy         History of Falls Interventions: Evaluate medications/consider consulting pharmacy         Problem: Patient Education: Go to Patient Education Activity  Goal: Patient/Family Education  Outcome: Progressing Towards Goal     Problem: Chronic Obstructive Pulmonary Disease (COPD)  Goal: *Oxygen saturation during activity within specified parameters  Outcome: Progressing Towards Goal  Goal: *Able to remain out of bed as prescribed  Outcome: Progressing Towards Goal  Goal: *Absence of hypoxia  Outcome: Progressing Towards Goal  Goal: *Optimize nutritional status  Outcome: Progressing Towards Goal     Problem: Patient Education: Go to Patient Education Activity  Goal: Patient/Family Education  Outcome: Progressing Towards Goal     Problem: Pain  Goal: *Control of Pain  Outcome: Progressing Towards Goal  Goal: *PALLIATIVE CARE:  Alleviation of Pain  Outcome: Progressing Towards Goal     Problem: Diabetes Self-Management  Goal: *Disease process and treatment process  Description  Define diabetes and identify own type of diabetes; list 3 options for treating diabetes. Outcome: Progressing Towards Goal  Goal: *Incorporating nutritional management into lifestyle  Description  Describe effect of type, amount and timing of food on blood glucose; list 3 methods for planning meals. Outcome: Progressing Towards Goal  Goal: *Incorporating physical activity into lifestyle  Description  State effect of exercise on blood glucose levels.   Outcome: Progressing Towards Goal  Goal: *Developing strategies to promote health/change behavior  Description  Define the ABC's of diabetes; identify appropriate screenings, schedule and personal plan for screenings. Outcome: Progressing Towards Goal  Goal: *Using medications safely  Description  State effect of diabetes medications on diabetes; name diabetes medication taking, action and side effects. Outcome: Progressing Towards Goal  Goal: *Monitoring blood glucose, interpreting and using results  Description  Identify recommended blood glucose targets  and personal targets. Outcome: Progressing Towards Goal  Goal: *Prevention, detection, treatment of acute complications  Description  List symptoms of hyper- and hypoglycemia; describe how to treat low blood sugar and actions for lowering  high blood glucose level. Outcome: Progressing Towards Goal  Goal: *Prevention, detection and treatment of chronic complications  Description  Define the natural course of diabetes and describe the relationship of blood glucose levels to long term complications of diabetes.   Outcome: Progressing Towards Goal  Goal: *Developing strategies to address psychosocial issues  Description  Describe feelings about living with diabetes; identify support needed and support network  Outcome: Progressing Towards Goal  Goal: *Insulin pump training  Outcome: Progressing Towards Goal  Goal: *Sick day guidelines  Outcome: Progressing Towards Goal  Goal: *Patient Specific Goal (EDIT GOAL, INSERT TEXT)  Outcome: Progressing Towards Goal     Problem: Patient Education: Go to Patient Education Activity  Goal: Patient/Family Education  Outcome: Progressing Towards Goal     Problem: Patient Education: Go to Patient Education Activity  Goal: Patient/Family Education  Outcome: Progressing Towards Goal     Problem: Patient Education: Go to Patient Education Activity  Goal: Patient/Family Education  Outcome: Progressing Towards Goal     Problem: Pressure Injury - Risk of  Goal: *Prevention of pressure injury  Description  Document Viktor Scale and appropriate interventions in the flowsheet.   Outcome: Progressing Towards Goal  Note: Pressure Injury Interventions:  Sensory Interventions: Assess changes in LOC, Avoid rigorous massage over bony prominences, Check visual cues for pain, Keep linens dry and wrinkle-free, Maintain/enhance activity level, Minimize linen layers, Pressure redistribution bed/mattress (bed type)         Activity Interventions: Pressure redistribution bed/mattress(bed type)    Mobility Interventions: Pressure redistribution bed/mattress (bed type), HOB 30 degrees or less    Nutrition Interventions: Document food/fluid/supplement intake    Friction and Shear Interventions: HOB 30 degrees or less                Problem: Patient Education: Go to Patient Education Activity  Goal: Patient/Family Education  Outcome: Progressing Towards Goal

## 2020-03-08 LAB
ANION GAP SERPL CALC-SCNC: 7 MMOL/L (ref 3–18)
BASOPHILS # BLD: 0 K/UL (ref 0–0.1)
BASOPHILS NFR BLD: 0 % (ref 0–2)
BUN SERPL-MCNC: 18 MG/DL (ref 7–18)
BUN/CREAT SERPL: 20 (ref 12–20)
CALCIUM SERPL-MCNC: 9 MG/DL (ref 8.5–10.1)
CHLORIDE SERPL-SCNC: 102 MMOL/L (ref 100–111)
CO2 SERPL-SCNC: 27 MMOL/L (ref 21–32)
CREAT SERPL-MCNC: 0.92 MG/DL (ref 0.6–1.3)
DIFFERENTIAL METHOD BLD: ABNORMAL
EOSINOPHIL # BLD: 0 K/UL (ref 0–0.4)
EOSINOPHIL NFR BLD: 0 % (ref 0–5)
ERYTHROCYTE [DISTWIDTH] IN BLOOD BY AUTOMATED COUNT: 13.4 % (ref 11.6–14.5)
GLUCOSE BLD STRIP.AUTO-MCNC: 172 MG/DL (ref 70–110)
GLUCOSE BLD STRIP.AUTO-MCNC: 183 MG/DL (ref 70–110)
GLUCOSE BLD STRIP.AUTO-MCNC: 208 MG/DL (ref 70–110)
GLUCOSE BLD STRIP.AUTO-MCNC: 271 MG/DL (ref 70–110)
GLUCOSE SERPL-MCNC: 253 MG/DL (ref 74–99)
HCT VFR BLD AUTO: 37.9 % (ref 35–45)
HGB BLD-MCNC: 12.9 G/DL (ref 12–16)
LYMPHOCYTES # BLD: 1.4 K/UL (ref 0.9–3.6)
LYMPHOCYTES NFR BLD: 18 % (ref 21–52)
MCH RBC QN AUTO: 29.5 PG (ref 24–34)
MCHC RBC AUTO-ENTMCNC: 34 G/DL (ref 31–37)
MCV RBC AUTO: 86.5 FL (ref 74–97)
MONOCYTES # BLD: 0.3 K/UL (ref 0.05–1.2)
MONOCYTES NFR BLD: 4 % (ref 3–10)
NEUTS SEG # BLD: 6.1 K/UL (ref 1.8–8)
NEUTS SEG NFR BLD: 78 % (ref 40–73)
PLATELET # BLD AUTO: 300 K/UL (ref 135–420)
PMV BLD AUTO: 9.5 FL (ref 9.2–11.8)
POTASSIUM SERPL-SCNC: 3.9 MMOL/L (ref 3.5–5.5)
RBC # BLD AUTO: 4.38 M/UL (ref 4.2–5.3)
SODIUM SERPL-SCNC: 136 MMOL/L (ref 136–145)
WBC # BLD AUTO: 7.7 K/UL (ref 4.6–13.2)

## 2020-03-08 PROCEDURE — 74011636637 HC RX REV CODE- 636/637: Performed by: STUDENT IN AN ORGANIZED HEALTH CARE EDUCATION/TRAINING PROGRAM

## 2020-03-08 PROCEDURE — 80048 BASIC METABOLIC PNL TOTAL CA: CPT

## 2020-03-08 PROCEDURE — 36415 COLL VENOUS BLD VENIPUNCTURE: CPT

## 2020-03-08 PROCEDURE — 74011250637 HC RX REV CODE- 250/637: Performed by: STUDENT IN AN ORGANIZED HEALTH CARE EDUCATION/TRAINING PROGRAM

## 2020-03-08 PROCEDURE — 77010033678 HC OXYGEN DAILY

## 2020-03-08 PROCEDURE — 94761 N-INVAS EAR/PLS OXIMETRY MLT: CPT

## 2020-03-08 PROCEDURE — 65660000000 HC RM CCU STEPDOWN

## 2020-03-08 PROCEDURE — 74011250636 HC RX REV CODE- 250/636: Performed by: STUDENT IN AN ORGANIZED HEALTH CARE EDUCATION/TRAINING PROGRAM

## 2020-03-08 PROCEDURE — 85025 COMPLETE CBC W/AUTO DIFF WBC: CPT

## 2020-03-08 PROCEDURE — 74011636637 HC RX REV CODE- 636/637: Performed by: FAMILY MEDICINE

## 2020-03-08 PROCEDURE — 74011000250 HC RX REV CODE- 250: Performed by: STUDENT IN AN ORGANIZED HEALTH CARE EDUCATION/TRAINING PROGRAM

## 2020-03-08 PROCEDURE — 94640 AIRWAY INHALATION TREATMENT: CPT

## 2020-03-08 PROCEDURE — 82962 GLUCOSE BLOOD TEST: CPT

## 2020-03-08 RX ORDER — INSULIN GLARGINE 100 [IU]/ML
35 INJECTION, SOLUTION SUBCUTANEOUS DAILY
Status: DISCONTINUED | OUTPATIENT
Start: 2020-03-09 | End: 2020-03-10 | Stop reason: HOSPADM

## 2020-03-08 RX ADMIN — INSULIN LISPRO 6 UNITS: 100 INJECTION, SOLUTION INTRAVENOUS; SUBCUTANEOUS at 08:42

## 2020-03-08 RX ADMIN — INSULIN LISPRO 3 UNITS: 100 INJECTION, SOLUTION INTRAVENOUS; SUBCUTANEOUS at 16:18

## 2020-03-08 RX ADMIN — INSULIN LISPRO 9 UNITS: 100 INJECTION, SOLUTION INTRAVENOUS; SUBCUTANEOUS at 21:35

## 2020-03-08 RX ADMIN — FLUTICASONE PROPIONATE 2 SPRAY: 50 SPRAY, METERED NASAL at 08:41

## 2020-03-08 RX ADMIN — Medication 1 CAPSULE: at 08:39

## 2020-03-08 RX ADMIN — PREDNISONE 60 MG: 20 TABLET ORAL at 18:38

## 2020-03-08 RX ADMIN — IPRATROPIUM BROMIDE 0.5 MG: 0.5 SOLUTION RESPIRATORY (INHALATION) at 19:57

## 2020-03-08 RX ADMIN — ARFORMOTEROL TARTRATE 15 MCG: 15 SOLUTION RESPIRATORY (INHALATION) at 07:44

## 2020-03-08 RX ADMIN — ENOXAPARIN SODIUM 40 MG: 40 INJECTION SUBCUTANEOUS at 08:37

## 2020-03-08 RX ADMIN — AMOXICILLIN AND CLAVULANATE POTASSIUM 1 TABLET: 875; 125 TABLET, FILM COATED ORAL at 08:40

## 2020-03-08 RX ADMIN — LISINOPRIL 20 MG: 20 TABLET ORAL at 08:40

## 2020-03-08 RX ADMIN — BUDESONIDE 1000 MCG: 1 SUSPENSION RESPIRATORY (INHALATION) at 19:57

## 2020-03-08 RX ADMIN — BUDESONIDE 1000 MCG: 1 SUSPENSION RESPIRATORY (INHALATION) at 07:44

## 2020-03-08 RX ADMIN — Medication 81 MG: at 08:39

## 2020-03-08 RX ADMIN — INSULIN GLARGINE 25 UNITS: 100 INJECTION, SOLUTION SUBCUTANEOUS at 08:38

## 2020-03-08 RX ADMIN — HYDROCHLOROTHIAZIDE 12.5 MG: 25 TABLET ORAL at 08:39

## 2020-03-08 RX ADMIN — MONTELUKAST 10 MG: 10 TABLET, FILM COATED ORAL at 21:37

## 2020-03-08 RX ADMIN — INSULIN LISPRO 3 UNITS: 100 INJECTION, SOLUTION INTRAVENOUS; SUBCUTANEOUS at 12:44

## 2020-03-08 RX ADMIN — GUAIFENESIN 600 MG: 600 TABLET, EXTENDED RELEASE ORAL at 21:37

## 2020-03-08 RX ADMIN — ARFORMOTEROL TARTRATE 15 MCG: 15 SOLUTION RESPIRATORY (INHALATION) at 19:57

## 2020-03-08 RX ADMIN — PANTOPRAZOLE SODIUM 40 MG: 40 TABLET, DELAYED RELEASE ORAL at 08:39

## 2020-03-08 RX ADMIN — AMOXICILLIN AND CLAVULANATE POTASSIUM 1 TABLET: 875; 125 TABLET, FILM COATED ORAL at 21:37

## 2020-03-08 RX ADMIN — GUAIFENESIN 600 MG: 600 TABLET, EXTENDED RELEASE ORAL at 08:39

## 2020-03-08 NOTE — ROUTINE PROCESS
1906 Assumed care of patient from off going nurse. Patient sitting up in bed on cell phone. No distress noted. Call bell within reach, siderails up x 3, bed in lowest position, and patient instructed to use call bell for assistance. Will continue to monitor. 7471 Bedside and Verbal shift change report given to Deana Juarez RN (oncoming nurse) by John Soria RN(offgoing nurse). Report included the following information SBAR, Intake/Output, MAR, Recent Results and Cardiac Rhythm SR/SB.

## 2020-03-08 NOTE — PROGRESS NOTES
Problem: Falls - Risk of  Goal: *Absence of Falls  Description  Document Bishop Mendez Fall Risk and appropriate interventions in the flowsheet. Outcome: Progressing Towards Goal  Note: Fall Risk Interventions:  Mobility Interventions: Assess mobility with egress test, Communicate number of staff needed for ambulation/transfer, Mechanical lift, OT consult for ADLs, Patient to call before getting OOB, PT Consult for mobility concerns         Medication Interventions: Bed/chair exit alarm, Evaluate medications/consider consulting pharmacy, Patient to call before getting OOB, Teach patient to arise slowly    Elimination Interventions: Bed/chair exit alarm, Call light in reach, Patient to call for help with toileting needs    History of Falls Interventions: Bed/chair exit alarm, Door open when patient unattended, Evaluate medications/consider consulting pharmacy, Room close to nurse's station, Assess for delayed presentation/identification of injury for 48 hrs (comment for end date)         Problem: Patient Education: Go to Patient Education Activity  Goal: Patient/Family Education  Outcome: Progressing Towards Goal     Problem: Chronic Obstructive Pulmonary Disease (COPD)  Goal: *Oxygen saturation during activity within specified parameters  Outcome: Progressing Towards Goal  Goal: *Able to remain out of bed as prescribed  Outcome: Progressing Towards Goal  Goal: *Absence of hypoxia  Outcome: Progressing Towards Goal  Goal: *Optimize nutritional status  Outcome: Progressing Towards Goal     Problem: Pain  Goal: *Control of Pain  Outcome: Progressing Towards Goal  Goal: *PALLIATIVE CARE:  Alleviation of Pain  Outcome: Progressing Towards Goal     Problem: Pressure Injury - Risk of  Goal: *Prevention of pressure injury  Description  Document Viktor Scale and appropriate interventions in the flowsheet.   Outcome: Progressing Towards Goal  Note: Pressure Injury Interventions:  Sensory Interventions: Assess changes in LOC, Assess need for specialty bed, Check visual cues for pain, Monitor skin under medical devices         Activity Interventions: Assess need for specialty bed, Pressure redistribution bed/mattress(bed type), PT/OT evaluation    Mobility Interventions: Assess need for specialty bed, Float heels, HOB 30 degrees or less, Pressure redistribution bed/mattress (bed type), PT/OT evaluation, Turn and reposition approx.  every two hours(pillow and wedges)    Nutrition Interventions: Document food/fluid/supplement intake, Discuss nutritional consult with provider, Offer support with meals,snacks and hydration    Friction and Shear Interventions: Apply protective barrier, creams and emollients, Foam dressings/transparent film/skin sealants, HOB 30 degrees or less, Lift team/patient mobility team, Minimize layers, Transferring/repositioning devices

## 2020-03-08 NOTE — PROGRESS NOTES
Obtained formed stool sample from patient at this time, had collected it for ordered C-difficile toxin lab. Sample does not meet criteria for testing(does not form to container). Notified Dr. Ysabel Canada of sample and . Patient remains without any other symptoms.

## 2020-03-08 NOTE — ROUTINE PROCESS
1912 Assumed care of patient from off going nurse. Patient resting in bed. No distress noted. Call bell within reach, siderails up x 3, bed in lowest position, and patient instructed to use call bell for assistance. Will continue to monitor. 0200 The beginning of Daylight Saving Time occurred at 0200 hrs. Documentation of patient care and medications administered is done with respect to the time change.    0706 Bedside and Verbal shift change report given to Janna Gomes RN (oncoming nurse) by Safia Butterfield RN (offgoing nurse). Report included the following information SBAR, Intake/Output, MAR, Recent Results and Cardiac Rhythm SR/SB.

## 2020-03-08 NOTE — PROGRESS NOTES
[facility]  Intern Progress Note    Patient: Deuce Murguia MRN: [id number]   SSN: [id number]  YOB: 1960   Age: 60 y.o. Sex: female      Admit Date: 3/4/2020    LOS: 4 days   Chief Complaint   Patient presents with    Cough    Shortness of Breath       Subjective:     Patient states breathing is still improving. Patient receiving breathing treatment at the time of examination. States she feels like her chest is loosening up however experiences chest tightness when walking around. Patient stated that she had 7 watery stools yesterday. She stated that the stool this AM was less watery however still loose. Review of Systems   Constitutional: Negative for chills and fever. Respiratory: Positive for shortness of breath. Cardiovascular: Negative for chest pain and palpitations. Gastrointestinal: Positive for diarrhea. Negative for blood in stool, constipation, nausea and vomiting. Neurological: Negative for dizziness and headaches. Objective:   Physical Exam:  General: NAD  CV: RRR, no murmurs/gallops/rubs, peripheral pulses intact  Resp: Breathing comfortably while receiving breathing treatment. speaking in full sentences. Decreased air flow especially in bases. No wheezing or rales.    Abdominal: Soft, Slightly tender in the epigastric region, non-distended, Bs+   Extermities: Trace edema bilaterally    Vitals:   Patient Vitals for the past 24 hrs:   Temp Pulse Resp BP SpO2   03/08/20 0746 -- -- -- -- 98 %   03/08/20 0726 97.6 °F (36.4 °C) (!) 52 18 137/73 99 %   03/08/20 0426 97.8 °F (36.6 °C) (!) 55 19 123/73 98 %   03/08/20 0035 97.4 °F (36.3 °C) (!) 59 19 124/63 98 %   03/07/20 1939 97 °F (36.1 °C) 68 19 124/72 96 %   03/07/20 1552 97.4 °F (36.3 °C) 64 18 153/79 95 %   03/07/20 1154 97.3 °F (36.3 °C) 60 18 160/85 95 %         Intake/Output Summary (Last 24 hours) at 3/8/2020 1011  Last data filed at 3/8/2020 0426  Gross per 24 hour   Intake 1260 ml Output 1800 ml   Net -540 ml     No results found. Recent Results (from the past 24 hour(s))   GLUCOSE, POC    Collection Time: 03/07/20 11:51 AM   Result Value Ref Range    Glucose (POC) 262 (H) 70 - 110 mg/dL   GLUCOSE, POC    Collection Time: 03/07/20  3:49 PM   Result Value Ref Range    Glucose (POC) 204 (H) 70 - 110 mg/dL   GLUCOSE, POC    Collection Time: 03/07/20  9:37 PM   Result Value Ref Range    Glucose (POC) 232 (H) 70 - 279 mg/dL   METABOLIC PANEL, BASIC    Collection Time: 03/08/20  4:56 AM   Result Value Ref Range    Sodium 136 136 - 145 mmol/L    Potassium 3.9 3.5 - 5.5 mmol/L    Chloride 102 100 - 111 mmol/L    CO2 27 21 - 32 mmol/L    Anion gap 7 3.0 - 18 mmol/L    Glucose 253 (H) 74 - 99 mg/dL    BUN 18 7.0 - 18 MG/DL    Creatinine 0.92 0.6 - 1.3 MG/DL    BUN/Creatinine ratio 20 12 - 20      GFR est AA >60 >60 ml/min/1.73m2    GFR est non-AA >60 >60 ml/min/1.73m2    Calcium 9.0 8.5 - 10.1 MG/DL   CBC WITH AUTOMATED DIFF    Collection Time: 03/08/20  4:56 AM   Result Value Ref Range    WBC 7.7 4.6 - 13.2 K/uL    RBC 4.38 4.20 - 5.30 M/uL    HGB 12.9 12.0 - 16.0 g/dL    HCT 37.9 35.0 - 45.0 %    MCV 86.5 74.0 - 97.0 FL    MCH 29.5 24.0 - 34.0 PG    MCHC 34.0 31.0 - 37.0 g/dL    RDW 13.4 11.6 - 14.5 %    PLATELET 069 675 - 539 K/uL    MPV 9.5 9.2 - 11.8 FL    NEUTROPHILS 78 (H) 40 - 73 %    LYMPHOCYTES 18 (L) 21 - 52 %    MONOCYTES 4 3 - 10 %    EOSINOPHILS 0 0 - 5 %    BASOPHILS 0 0 - 2 %    ABS. NEUTROPHILS 6.1 1.8 - 8.0 K/UL    ABS. LYMPHOCYTES 1.4 0.9 - 3.6 K/UL    ABS. MONOCYTES 0.3 0.05 - 1.2 K/UL    ABS. EOSINOPHILS 0.0 0.0 - 0.4 K/UL    ABS.  BASOPHILS 0.0 0.0 - 0.1 K/UL    DF AUTOMATED     GLUCOSE, POC    Collection Time: 03/08/20  7:32 AM   Result Value Ref Range    Glucose (POC) 208 (H) 70 - 110 mg/dL       Current Facility-Administered Medications:     insulin glargine (LANTUS) injection 25 Units, 25 Units, SubCUTAneous, DAILY, In, Mason MOTLEY MD, 25 Units at 03/08/20 3420   predniSONE (DELTASONE) tablet 60 mg, 60 mg, Oral, Q24H, In, Marvin Dasilva MD, 60 mg at 03/07/20 1852    lactobacillus sp. 50 billion cpu (BIO-K PLUS) capsule 1 Cap, 1 Cap, Oral, DAILY, In, Mason MOTLEY MD, 1 Cap at 03/08/20 0839    amoxicillin-clavulanate (AUGMENTIN) 875-125 mg per tablet 1 Tab, 1 Tab, Oral, Q12H, In, Marvin Dasilva MD, 1 Tab at 03/08/20 0840    guaiFENesin ER (MUCINEX) tablet 600 mg, 600 mg, Oral, Q12H, Jessi Bah MD, 600 mg at 03/08/20 0839    enoxaparin (LOVENOX) injection 40 mg, 40 mg, SubCUTAneous, Q24H, Jessi Bah MD, 40 mg at 03/08/20 0837    albuterol-ipratropium (DUO-NEB) 2.5 MG-0.5 MG/3 ML, 3 mL, Nebulization, Q4H PRN, Ashly Horta MD    aspirin delayed-release tablet 81 mg, 81 mg, Oral, DAILY, Jessi Bah MD, 81 mg at 03/08/20 0839    fluticasone propionate (FLONASE) 50 mcg/actuation nasal spray 2 Spray, 2 Spray, Both Nostrils, DAILY, Jessi Bah MD, 2 Spray at 03/08/20 0841    hydroCHLOROthiazide (HYDRODIURIL) tablet 12.5 mg, 12.5 mg, Oral, DAILY, Jessi Bah MD, 12.5 mg at 03/08/20 0839    lisinopril (PRINIVIL, ZESTRIL) tablet 20 mg, 20 mg, Oral, BID, Jessi Bah MD, 20 mg at 03/08/20 0840    montelukast (SINGULAIR) tablet 10 mg, 10 mg, Oral, QHS, Jessi Bah MD, 10 mg at 03/07/20 2139    pantoprazole (PROTONIX) tablet 40 mg, 40 mg, Oral, DAILY, Jessi Bah MD, 40 mg at 03/08/20 0839    budesonide (PULMICORT) 1,000 mcg/2 mL nebulizer susp, 1,000 mcg, Nebulization, BID RT, Jessi Horta MD, 1,000 mcg at 03/08/20 0744    arformoteroL (BROVANA) neb solution 15 mcg, 15 mcg, Nebulization, BID RT, Jessi Horta MD, 15 mcg at 03/08/20 0744    insulin lispro (HUMALOG) injection, , SubCUTAneous, AC&HS, Olivia Braun MD, 6 Units at 03/08/20 0842    glucose chewable tablet 16 g, 4 Tab, Oral, PRN, Ashly Horta MD    glucagon (GLUCAGEN) injection 1 mg, 1 mg, IntraMUSCular, PRN, Ashly Horta MD    ipratropium (ATROVENT) 0.02 % nebulizer solution 0.5 mg, 0.5 mg, Nebulization, Q6H RT, João Byrne MD, 0.5 mg at 03/07/20 1415    dextrose 10% infusion 125-250 mL, 125-250 mL, IntraVENous, PRN, Nicole Rowell, Irma Gonzales MD    Assessment/Plan:   Husam De Dios is a 60 y.o. female with PMH of COPD on home O2, IDDM2, HTN, HFpEF, SHERRIE on CPAP, GERD, allergic rhinitis, now admitted for COPD exacerbation.      COPD exacerbation: Acute on Chronic Respiratory Failure with hypercapnia   Has hx of asthma/COPD on 2L home 02. Followed by  [facility] in [place]. Per last pulm note pt may have environmental triggers. At home takes prednisone 10mg every other day, albuterol neb, spiriva, trelegy nightly. Afebrile, no leukocytosis on admission. . CXR with hyperinflation but no concern for infection. Patient has been on BiPAP overnight, and has been on 2L O2 throughout the day. States her breathing is improving.   - Continue Bipap PRN, oxygen PRN, goal 88-92%  - continue brovana/pulmicort  - continue atrovent nebs q6h  - continue Mucinex  - continue home singulair  - Dicontinued Solumedrol  - Started Prednisone 60mg daily 3/7/20  - Protonix for SUP  - Discontinued zosyn (3/5-3/7)  - Started Augmentin (3/8-)    Diarrhea: Pt with 7 watery stools on 3/7/2020. No SIRS. Abdominal tenderness in the epigastric region. Probiotic added 3/7/20 however given the number of stools and the watery consistency will need to r/o C Diff given pt's recent abx use  -F/U C-Diff AG&Toxin A/B      SHERRIE/pulm htn  Intolerant to Cpap. On trelegy nightly. - tolerating BiPAP here   - continue BIpap at night      Insuline dependent Type 2 Diabetes  Home regimen (lantus 35u and novolog SSI).  on admission. Fasting blood sugar this morning 208. Likely exacerbated by steroids. - Increased Lantas 25U to Lantus 35units  - SSI  - poc glucose achs  - diabetic diet     Hypertension, HFpEF  Stable.  Echo 7/7/18 EF 66-70%, LV mild concentric hypertrophy, No left regional wall motion abnorm. 's-150's this morning.   -continue home lisinopril 20mg bid, HCTZ 12.5mg     GERD  Takes omeprazole 40mg qD and pepcid for breakthrough symptoms at home.   -continue protonix 40mg qD while admitted     Allergic Rhinitis  -Continue home flonase, singulair 10mg     Diet: diabetic diet  DVT Prophylaxis: lovenox  Code Status: Full Code  [place] of [address], daughter, [phone]     Disposition and anticipated LOS:  2 midnights    Signed By: Berna Thomas MD     March 8, 2020

## 2020-03-09 VITALS
BODY MASS INDEX: 45.91 KG/M2 | HEIGHT: 63 IN | DIASTOLIC BLOOD PRESSURE: 73 MMHG | SYSTOLIC BLOOD PRESSURE: 138 MMHG | TEMPERATURE: 97.1 F | OXYGEN SATURATION: 96 % | RESPIRATION RATE: 18 BRPM | WEIGHT: 259.1 LBS | HEART RATE: 64 BPM

## 2020-03-09 LAB
ANION GAP SERPL CALC-SCNC: 3 MMOL/L (ref 3–18)
BASOPHILS # BLD: 0 K/UL (ref 0–0.1)
BASOPHILS NFR BLD: 0 % (ref 0–2)
BUN SERPL-MCNC: 19 MG/DL (ref 7–18)
BUN/CREAT SERPL: 17 (ref 12–20)
CALCIUM SERPL-MCNC: 8.8 MG/DL (ref 8.5–10.1)
CHLORIDE SERPL-SCNC: 99 MMOL/L (ref 100–111)
CO2 SERPL-SCNC: 32 MMOL/L (ref 21–32)
CREAT SERPL-MCNC: 1.1 MG/DL (ref 0.6–1.3)
DIFFERENTIAL METHOD BLD: ABNORMAL
EOSINOPHIL # BLD: 0 K/UL (ref 0–0.4)
EOSINOPHIL NFR BLD: 0 % (ref 0–5)
ERYTHROCYTE [DISTWIDTH] IN BLOOD BY AUTOMATED COUNT: 13.5 % (ref 11.6–14.5)
GLUCOSE BLD STRIP.AUTO-MCNC: 117 MG/DL (ref 70–110)
GLUCOSE BLD STRIP.AUTO-MCNC: 155 MG/DL (ref 70–110)
GLUCOSE BLD STRIP.AUTO-MCNC: 180 MG/DL (ref 70–110)
GLUCOSE SERPL-MCNC: 298 MG/DL (ref 74–99)
HCT VFR BLD AUTO: 39.1 % (ref 35–45)
HGB BLD-MCNC: 13.3 G/DL (ref 12–16)
LYMPHOCYTES # BLD: 1.4 K/UL (ref 0.9–3.6)
LYMPHOCYTES NFR BLD: 16 % (ref 21–52)
MCH RBC QN AUTO: 29.4 PG (ref 24–34)
MCHC RBC AUTO-ENTMCNC: 34 G/DL (ref 31–37)
MCV RBC AUTO: 86.3 FL (ref 74–97)
MONOCYTES # BLD: 0.3 K/UL (ref 0.05–1.2)
MONOCYTES NFR BLD: 3 % (ref 3–10)
NEUTS SEG # BLD: 6.9 K/UL (ref 1.8–8)
NEUTS SEG NFR BLD: 81 % (ref 40–73)
PLATELET # BLD AUTO: 330 K/UL (ref 135–420)
PMV BLD AUTO: 9.6 FL (ref 9.2–11.8)
POTASSIUM SERPL-SCNC: 4.4 MMOL/L (ref 3.5–5.5)
RBC # BLD AUTO: 4.53 M/UL (ref 4.2–5.3)
SODIUM SERPL-SCNC: 134 MMOL/L (ref 136–145)
WBC # BLD AUTO: 8.5 K/UL (ref 4.6–13.2)

## 2020-03-09 PROCEDURE — 74011250636 HC RX REV CODE- 250/636: Performed by: STUDENT IN AN ORGANIZED HEALTH CARE EDUCATION/TRAINING PROGRAM

## 2020-03-09 PROCEDURE — 74011636637 HC RX REV CODE- 636/637: Performed by: FAMILY MEDICINE

## 2020-03-09 PROCEDURE — 94761 N-INVAS EAR/PLS OXIMETRY MLT: CPT

## 2020-03-09 PROCEDURE — 97116 GAIT TRAINING THERAPY: CPT

## 2020-03-09 PROCEDURE — 74011636637 HC RX REV CODE- 636/637: Performed by: STUDENT IN AN ORGANIZED HEALTH CARE EDUCATION/TRAINING PROGRAM

## 2020-03-09 PROCEDURE — 74011250637 HC RX REV CODE- 250/637: Performed by: STUDENT IN AN ORGANIZED HEALTH CARE EDUCATION/TRAINING PROGRAM

## 2020-03-09 PROCEDURE — 82962 GLUCOSE BLOOD TEST: CPT

## 2020-03-09 PROCEDURE — 85025 COMPLETE CBC W/AUTO DIFF WBC: CPT

## 2020-03-09 PROCEDURE — 65660000000 HC RM CCU STEPDOWN

## 2020-03-09 PROCEDURE — 36415 COLL VENOUS BLD VENIPUNCTURE: CPT

## 2020-03-09 PROCEDURE — 94660 CPAP INITIATION&MGMT: CPT

## 2020-03-09 PROCEDURE — 74011000250 HC RX REV CODE- 250: Performed by: STUDENT IN AN ORGANIZED HEALTH CARE EDUCATION/TRAINING PROGRAM

## 2020-03-09 PROCEDURE — 94640 AIRWAY INHALATION TREATMENT: CPT

## 2020-03-09 PROCEDURE — 80048 BASIC METABOLIC PNL TOTAL CA: CPT

## 2020-03-09 RX ORDER — AMOXICILLIN AND CLAVULANATE POTASSIUM 875; 125 MG/1; MG/1
1 TABLET, FILM COATED ORAL EVERY 12 HOURS
Qty: 2 TAB | Refills: 0 | Status: SHIPPED | OUTPATIENT
Start: 2020-03-09 | End: 2020-03-25

## 2020-03-09 RX ORDER — PREDNISONE 20 MG/1
TABLET ORAL
Qty: 20 TAB | Refills: 0 | Status: SHIPPED | OUTPATIENT
Start: 2020-03-09 | End: 2020-03-25

## 2020-03-09 RX ADMIN — AMOXICILLIN AND CLAVULANATE POTASSIUM 1 TABLET: 875; 125 TABLET, FILM COATED ORAL at 08:28

## 2020-03-09 RX ADMIN — LISINOPRIL 20 MG: 20 TABLET ORAL at 08:28

## 2020-03-09 RX ADMIN — INSULIN LISPRO 3 UNITS: 100 INJECTION, SOLUTION INTRAVENOUS; SUBCUTANEOUS at 12:24

## 2020-03-09 RX ADMIN — PREDNISONE 60 MG: 20 TABLET ORAL at 19:01

## 2020-03-09 RX ADMIN — GUAIFENESIN 600 MG: 600 TABLET, EXTENDED RELEASE ORAL at 08:28

## 2020-03-09 RX ADMIN — HYDROCHLOROTHIAZIDE 12.5 MG: 25 TABLET ORAL at 08:28

## 2020-03-09 RX ADMIN — ARFORMOTEROL TARTRATE 15 MCG: 15 SOLUTION RESPIRATORY (INHALATION) at 07:35

## 2020-03-09 RX ADMIN — INSULIN LISPRO 3 UNITS: 100 INJECTION, SOLUTION INTRAVENOUS; SUBCUTANEOUS at 08:36

## 2020-03-09 RX ADMIN — PANTOPRAZOLE SODIUM 40 MG: 40 TABLET, DELAYED RELEASE ORAL at 08:29

## 2020-03-09 RX ADMIN — Medication 81 MG: at 08:28

## 2020-03-09 RX ADMIN — FLUTICASONE PROPIONATE 2 SPRAY: 50 SPRAY, METERED NASAL at 08:37

## 2020-03-09 RX ADMIN — IPRATROPIUM BROMIDE 0.5 MG: 0.5 SOLUTION RESPIRATORY (INHALATION) at 07:35

## 2020-03-09 RX ADMIN — INSULIN GLARGINE 35 UNITS: 100 INJECTION, SOLUTION SUBCUTANEOUS at 08:35

## 2020-03-09 RX ADMIN — Medication 1 CAPSULE: at 08:28

## 2020-03-09 RX ADMIN — BUDESONIDE 1000 MCG: 1 SUSPENSION RESPIRATORY (INHALATION) at 07:35

## 2020-03-09 RX ADMIN — ENOXAPARIN SODIUM 40 MG: 40 INJECTION SUBCUTANEOUS at 08:29

## 2020-03-09 NOTE — PROGRESS NOTES
Problem: Falls - Risk of  Goal: *Absence of Falls  Description  Document Clide Failing Fall Risk and appropriate interventions in the flowsheet. Outcome: Progressing Towards Goal  Note: Fall Risk Interventions:  Mobility Interventions: Patient to call before getting OOB         Medication Interventions: Evaluate medications/consider consulting pharmacy, Patient to call before getting OOB, Teach patient to arise slowly    Elimination Interventions: Call light in reach, Patient to call for help with toileting needs, Stay With Me (per policy), Toileting schedule/hourly rounds, Toilet paper/wipes in reach    History of Falls Interventions: Bed/chair exit alarm, Door open when patient unattended, Evaluate medications/consider consulting pharmacy, Room close to nurse's station         Problem: Patient Education: Go to Patient Education Activity  Goal: Patient/Family Education  Outcome: Progressing Towards Goal     Problem: Chronic Obstructive Pulmonary Disease (COPD)  Goal: *Oxygen saturation during activity within specified parameters  Outcome: Progressing Towards Goal  Goal: *Able to remain out of bed as prescribed  Outcome: Progressing Towards Goal  Goal: *Absence of hypoxia  Outcome: Progressing Towards Goal  Goal: *Optimize nutritional status  Outcome: Progressing Towards Goal     Problem: Patient Education: Go to Patient Education Activity  Goal: Patient/Family Education  Outcome: Progressing Towards Goal     Problem: Pain  Goal: *Control of Pain  Outcome: Progressing Towards Goal  Goal: *PALLIATIVE CARE:  Alleviation of Pain  Outcome: Progressing Towards Goal     Problem: Diabetes Self-Management  Goal: *Disease process and treatment process  Description  Define diabetes and identify own type of diabetes; list 3 options for treating diabetes.   Outcome: Progressing Towards Goal  Goal: *Incorporating nutritional management into lifestyle  Description  Describe effect of type, amount and timing of food on blood glucose; list 3 methods for planning meals. Outcome: Progressing Towards Goal  Goal: *Incorporating physical activity into lifestyle  Description  State effect of exercise on blood glucose levels. Outcome: Progressing Towards Goal  Goal: *Developing strategies to promote health/change behavior  Description  Define the ABC's of diabetes; identify appropriate screenings, schedule and personal plan for screenings. Outcome: Progressing Towards Goal  Goal: *Using medications safely  Description  State effect of diabetes medications on diabetes; name diabetes medication taking, action and side effects. Outcome: Progressing Towards Goal  Goal: *Monitoring blood glucose, interpreting and using results  Description  Identify recommended blood glucose targets  and personal targets. Outcome: Progressing Towards Goal  Goal: *Prevention, detection, treatment of acute complications  Description  List symptoms of hyper- and hypoglycemia; describe how to treat low blood sugar and actions for lowering  high blood glucose level. Outcome: Progressing Towards Goal  Goal: *Prevention, detection and treatment of chronic complications  Description  Define the natural course of diabetes and describe the relationship of blood glucose levels to long term complications of diabetes.   Outcome: Progressing Towards Goal  Goal: *Developing strategies to address psychosocial issues  Description  Describe feelings about living with diabetes; identify support needed and support network  Outcome: Progressing Towards Goal  Goal: *Patient Specific Goal (EDIT GOAL, INSERT TEXT)  Outcome: Progressing Towards Goal     Problem: Patient Education: Go to Patient Education Activity  Goal: Patient/Family Education  Outcome: Progressing Towards Goal     Problem: Patient Education: Go to Patient Education Activity  Goal: Patient/Family Education  Outcome: Progressing Towards Goal     Problem: Patient Education: Go to Patient Education Activity  Goal: Patient/Family Education  Outcome: Progressing Towards Goal     Problem: Pressure Injury - Risk of  Goal: *Prevention of pressure injury  Description  Document Viktor Scale and appropriate interventions in the flowsheet. Outcome: Progressing Towards Goal  Note: Pressure Injury Interventions:  Sensory Interventions: Assess changes in LOC, Assess need for specialty bed, Check visual cues for pain, Keep linens dry and wrinkle-free, Monitor skin under medical devices         Activity Interventions: Increase time out of bed    Mobility Interventions: Assess need for specialty bed, Pressure redistribution bed/mattress (bed type)    Nutrition Interventions: Document food/fluid/supplement intake    Friction and Shear Interventions: Minimize layers                Problem: Patient Education: Go to Patient Education Activity  Goal: Patient/Family Education  Outcome: Progressing Towards Goal     Problem: Risk for Spread of Infection  Goal: Prevent transmission of infectious organism to others  Description  Prevent the transmission of infectious organisms to other patients, staff members, and visitors.   Outcome: Progressing Towards Goal     Problem: Patient Education:  Go to Education Activity  Goal: Patient/Family Education  Outcome: Progressing Towards Goal

## 2020-03-09 NOTE — PROGRESS NOTES
Discussed BiPAP use with patient. She states that she places the mask herself when she is tired. Device at bedside on stand-by.

## 2020-03-09 NOTE — PROGRESS NOTES
[facility]  Intern Progress Note    Patient: Melissa Giron MRN: [id number]   SSN: [id number]  YOB: 1960   Age: 60 y.o. Sex: female      Admit Date: 3/4/2020    LOS: 5 days   Chief Complaint   Patient presents with    Cough    Shortness of Breath       Subjective:     Patient states breathing is still improving. Patient on BiPAP at time of examination. States she feels like her chest is loosening up however experiences chest tightness when walking around. Patient stated that her stools have become more formed and has not had anymore watery stools since yesterday. Review of Systems   Constitutional: Negative for chills and fever. Respiratory: Positive for shortness of breath. Cardiovascular: Negative for chest pain and palpitations. Gastrointestinal: Negative for blood in stool, constipation, diarrhea, nausea and vomiting. Neurological: Negative for dizziness and headaches. Objective:   Physical Exam:  General: NAD  CV: RRR, no murmurs/gallops/rubs, peripheral pulses intact  Resp: Breathing comfortably while receiving breathing treatment. speaking in full sentences. Decreased air flow especially in bases. No wheezing or rales. Abdominal: Soft, Slightly tender in the epigastric region, non-distended, BS+, umbilical hernia noted.    Extermities: 1+ edema in BLE    Vitals:   Patient Vitals for the past 24 hrs:   Temp Pulse Resp BP SpO2   03/09/20 0747 -- -- -- -- 98 %   03/09/20 0743 96.9 °F (36.1 °C) (!) 55 17 127/68 96 %   03/09/20 0354 97 °F (36.1 °C) 64 17 102/64 93 %   03/09/20 0008 97.8 °F (36.6 °C) 64 16 108/59 96 %   03/08/20 2354 -- -- -- -- 99 %   03/08/20 1907 97.5 °F (36.4 °C) 68 17 139/76 98 %   03/08/20 1605 98.2 °F (36.8 °C) 65 18 105/72 96 %   03/08/20 1153 97.2 °F (36.2 °C) (!) 58 18 128/79 99 %         Intake/Output Summary (Last 24 hours) at 3/9/2020 0924  Last data filed at 3/9/2020 0619  Gross per 24 hour   Intake 960 ml   Output 1400 ml   Net -440 ml     Xr Chest Pa Lat    Result Date: 3/4/2020  IMPRESSION: Hyperinflation compatible with COPD. Stable scarring right lung base. Recent Results (from the past 24 hour(s))   GLUCOSE, POC    Collection Time: 03/08/20 11:57 AM   Result Value Ref Range    Glucose (POC) 183 (H) 70 - 110 mg/dL   GLUCOSE, POC    Collection Time: 03/08/20  4:01 PM   Result Value Ref Range    Glucose (POC) 172 (H) 70 - 110 mg/dL   GLUCOSE, POC    Collection Time: 03/08/20  9:35 PM   Result Value Ref Range    Glucose (POC) 271 (H) 70 - 711 mg/dL   METABOLIC PANEL, BASIC    Collection Time: 03/09/20  3:44 AM   Result Value Ref Range    Sodium 134 (L) 136 - 145 mmol/L    Potassium 4.4 3.5 - 5.5 mmol/L    Chloride 99 (L) 100 - 111 mmol/L    CO2 32 21 - 32 mmol/L    Anion gap 3 3.0 - 18 mmol/L    Glucose 298 (H) 74 - 99 mg/dL    BUN 19 (H) 7.0 - 18 MG/DL    Creatinine 1.10 0.6 - 1.3 MG/DL    BUN/Creatinine ratio 17 12 - 20      GFR est AA >60 >60 ml/min/1.73m2    GFR est non-AA 51 (L) >60 ml/min/1.73m2    Calcium 8.8 8.5 - 10.1 MG/DL   CBC WITH AUTOMATED DIFF    Collection Time: 03/09/20  3:44 AM   Result Value Ref Range    WBC 8.5 4.6 - 13.2 K/uL    RBC 4.53 4.20 - 5.30 M/uL    HGB 13.3 12.0 - 16.0 g/dL    HCT 39.1 35.0 - 45.0 %    MCV 86.3 74.0 - 97.0 FL    MCH 29.4 24.0 - 34.0 PG    MCHC 34.0 31.0 - 37.0 g/dL    RDW 13.5 11.6 - 14.5 %    PLATELET 761 777 - 135 K/uL    MPV 9.6 9.2 - 11.8 FL    NEUTROPHILS 81 (H) 40 - 73 %    LYMPHOCYTES 16 (L) 21 - 52 %    MONOCYTES 3 3 - 10 %    EOSINOPHILS 0 0 - 5 %    BASOPHILS 0 0 - 2 %    ABS. NEUTROPHILS 6.9 1.8 - 8.0 K/UL    ABS. LYMPHOCYTES 1.4 0.9 - 3.6 K/UL    ABS. MONOCYTES 0.3 0.05 - 1.2 K/UL    ABS. EOSINOPHILS 0.0 0.0 - 0.4 K/UL    ABS.  BASOPHILS 0.0 0.0 - 0.1 K/UL    DF AUTOMATED     GLUCOSE, POC    Collection Time: 03/09/20  7:35 AM   Result Value Ref Range    Glucose (POC) 180 (H) 70 - 110 mg/dL       Current Facility-Administered Medications:     insulin glargine (LANTUS) injection 35 Units, 35 Units, SubCUTAneous, DAILY, Tammy Solano MD, 35 Units at 03/09/20 0835    predniSONE (DELTASONE) tablet 60 mg, 60 mg, Oral, Q24H, In, Lillian Stern MD, 60 mg at 03/08/20 1838    lactobacillus sp. 50 billion cpu (BIO-K PLUS) capsule 1 Cap, 1 Cap, Oral, DAILY, In, Mason MOTLEY MD, 1 Cap at 03/09/20 0828    amoxicillin-clavulanate (AUGMENTIN) 875-125 mg per tablet 1 Tab, 1 Tab, Oral, Q12H, In, Mason MOTLEY MD, 1 Tab at 03/09/20 0828    guaiFENesin ER (MUCINEX) tablet 600 mg, 600 mg, Oral, Q12H, Jessi Bah MD, 600 mg at 03/09/20 0828    enoxaparin (LOVENOX) injection 40 mg, 40 mg, SubCUTAneous, Q24H, Jessi Bah MD, 40 mg at 03/09/20 0829    albuterol-ipratropium (DUO-NEB) 2.5 MG-0.5 MG/3 ML, 3 mL, Nebulization, Q4H PRN, Adelle Bamberger, Abigail Morgans, MD    aspirin delayed-release tablet 81 mg, 81 mg, Oral, DAILY, Jessi Bah MD, 81 mg at 03/09/20 0828    fluticasone propionate (FLONASE) 50 mcg/actuation nasal spray 2 Spray, 2 Spray, Both Nostrils, DAILY, Jessi Bah MD, 2 Spray at 03/09/20 0837    hydroCHLOROthiazide (HYDRODIURIL) tablet 12.5 mg, 12.5 mg, Oral, DAILY, Jessi Bah MD, 12.5 mg at 03/09/20 7517    lisinopril (PRINIVIL, ZESTRIL) tablet 20 mg, 20 mg, Oral, BID, Jessi Bah MD, 20 mg at 03/09/20 0828    montelukast (SINGULAIR) tablet 10 mg, 10 mg, Oral, QHS, Jessi Bah MD, 10 mg at 03/08/20 2137    pantoprazole (PROTONIX) tablet 40 mg, 40 mg, Oral, DAILY, Jessi Bah MD, 40 mg at 03/09/20 0829    budesonide (PULMICORT) 1,000 mcg/2 mL nebulizer susp, 1,000 mcg, Nebulization, BID RT, Adelle Bamberger, Amy M, MD, 1,000 mcg at 03/09/20 0735    arformoteroL (BROVANA) neb solution 15 mcg, 15 mcg, Nebulization, BID RT, Adelle Bamberger, Amy M, MD, 15 mcg at 03/09/20 0735    insulin lispro (HUMALOG) injection, , SubCUTAneous, AC&HS, Gilmar Shell MD, 3 Units at 03/09/20 0836    glucose chewable tablet 16 g, 4 Tab, Oral, PRN, Adelle Bamberger, Amy M, MD    glucagon (GLUCAGEN) injection 1 mg, 1 mg, IntraMUSCular, PRN, Courtney Flower MD    ipratropium (ATROVENT) 0.02 % nebulizer solution 0.5 mg, 0.5 mg, Nebulization, Q6H RT, Jessi Bah MD, 0.5 mg at 03/09/20 0735    dextrose 10% infusion 125-250 mL, 125-250 mL, IntraVENous, PRN, Courtney Flower MD    Assessment/Plan:   Macario De Dios is a 60 y.o. female with PMH of COPD on home O2, IDDM2, HTN, HFpEF, SHERRIE on CPAP, GERD, allergic rhinitis, now admitted for COPD exacerbation.      COPD exacerbation: Acute on Chronic Respiratory Failure with hypercapnia   Has hx of asthma/COPD on 2L home 02. Followed by Dr. Ronnell Hamlin in OP. Per last pulm note pt may have environmental triggers. At home takes prednisone 10mg every other day, albuterol neb, spiriva, trelegy nightly. Afebrile, no leukocytosis on admission. . CXR with hyperinflation but no concern for infection. Patient has been on BiPAP overnight, and has been on 2L O2 throughout the day. States her breathing is improving.   - Continue Bipap PRN, oxygen PRN, goal 88-92%  - continue brovana/pulmicort  - continue atrovent nebs q6h  - continue Mucinex  - continue home singulair  - Dicontinued Solumedrol  - Started Prednisone 60mg daily 3/7/20  - Protonix for SUP  - Discontinued zosyn (3/5-3/7)  - Started Augmentin (3/8-)      SHERRIE/pulm htn  Intolerant to Cpap. On trelegy nightly at home. - tolerating BiPAP while admitted  - continue BIpap at night      Insuline dependent Type 2 Diabetes  Home regimen (lantus 35u and novolog SSI).  on admission. Fasting blood sugar this morning 180. Likely exacerbated by steroids. - Increased Lantas 25U to Lantus 35units on 3/8/20  - SSI  - poc glucose achs  - diabetic diet     Hypertension, HFpEF  Stable.  Echo 7/7/18 EF 66-70%, LV mild concentric hypertrophy, No left regional wall motion abnorm. 's-150's this morning.   -continue home lisinopril 20mg bid, HCTZ 12.5mg     GERD  Takes omeprazole 40mg daily and pepcid for breakthrough symptoms at home.   -continue protonix 40mg daily while admitted     Allergic Rhinitis  -Continue home flonase, singulair 10mg    Diarrhea: Pt with 7 watery stools on 3/7/2020. No SIRS. Abdominal tenderness in the epigastric region. Probiotic added 3/7/20 however given the number of stools and the watery consistency considered ruling out C Diff.  However, since stools became more formed 3/8/20 and the diarrhea resolved the C-Diff AG&Toxin A/B was not sent.      Diet: diabetic diet  DVT Prophylaxis: lovenox  Code Status: Full Code  [place] of [address], daughter, [phone]     Disposition and anticipated LOS:  2 midnights    Signed By: Jorge L Lagos MD     March 9, 2020

## 2020-03-09 NOTE — ROUTINE PROCESS
Bedside and Verbal shift change report given to Gume Chapman (oncoming nurse) by Callie Sarabia (offgoing nurse). Report included the following information SBAR, Kardex, Intake/Output, MAR, Recent Results and Alarm Parameters .

## 2020-03-09 NOTE — ROUTINE PROCESS
Bedside and Verbal shift change report given to [address] [place] [address] (oncoming nurse) by Bonifacio Qiu (offgoing nurse). Report included the following information SBAR, ED Summary, Procedure Summary, Intake/Output, Recent Results and Alarm Parameters .

## 2020-03-09 NOTE — DIABETES MGMT
GLYCEMIC CONTROL PLAN OF CARE     Assessment/Recommendations:  Fasting lab glucose this am 298 mg/dl  Noted Lantus dose increased to 35 units daily  Continue corrective insulin coverage as needed  Already receiving very insulin resistant dosing  Will continue inpatient monitoring. Most recent blood glucose values:  Results for Vandana Zimmer (MRN [id number]) as of 3/9/2020 12:18   Ref. Range 3/8/2020 11:57 3/8/2020 16:01 3/8/2020 21:35 3/9/2020 07:35 3/9/2020 11:15   GLUCOSE,FAST - POC Latest Ref Range: 70 - 110 mg/dL 183 (H) 172 (H) 271 (H) 180 (H) 155 (H)     Current A1C of 8.2 % is equivalent to average blood glucose of 189 mg/dl over the past 2-3 months.     Current hospital diabetes medications:   Lantus 35 units daily  Lispro corrective insulin coverage AC&HS as needed  Previous day's insulin requirements:   lantus 25 units  Lispro 21 units corrective insulin coverage  Home diabetes medications:  Lantus insulin 35 units nightly  Novolog insulin per sliding scale   Diet:    Diabetic consistent carb  Education:  __x_Refer to Diabetes Education Record 3/5/2020            ____Education not indicated at this time      Fabian Judd, [facility] CDE  Ext [phone]

## 2020-03-09 NOTE — PROGRESS NOTES
Problem: Mobility Impaired (Adult and Pediatric)  Goal: *Acute Goals and Plan of Care (Insert Text)  Description  Physical Therapy Goals  Initiated 3/5/2020 and to be accomplished within 7 day(s)  1. Patient will move from supine to sit and sit to supine , scoot up and down and roll side to side in bed with modified independence. 2.  Patient will transfer from bed to chair and chair to bed with modified independence using the least restrictive device. 3.  Patient will perform sit to stand with modified independence. 4.  Patient will ambulate with supervision/set-up for 100 feet with the least restrictive device. 5.  Patient will ascend/descend 4-8 stairs with R handrail(s) with supervision/set-up. Prior Level of Function:   Patient was modified independence for all mobility including gait with intermittent use of rollator. Patient lives with her daughter and grandchildren in a duplex, 2 [address]EV[address] no HR; patient's bedroom is on the 1st level, but bathroom is upstairs (14 steps R HR). Patient uses 2L supplemental O2 and nebulizer at home. Outcome: Progressing Towards Goal   PHYSICAL THERAPY TREATMENT    Patient: Nelida Danielle (40 y.o. female)  Date: 3/9/2020  Diagnosis: COPD with acute exacerbation ([address] [J44.1]  COPD exacerbation ([address] [J44.1]   COPD with acute exacerbation ([address]       Precautions: Fall  PLOF: Independent on O2    ASSESSMENT:  Pt presents sitting on EOB with nasal canula on. Pt agreed to participate. Pt performed sit<->stand Dominguez. Pt ambulated within room Dominguez managing O2 tubing independently as pt must do at home with O2 concentrator. Progression toward goals:   [x]      Improving appropriately and progressing toward goals  []      Improving slowly and progressing toward goals  []      Not making progress toward goals and plan of care will be adjusted     PLAN:  Patient continues to benefit from skilled intervention to address the above impairments.   Continue treatment per established plan of care. Discharge Recommendations:  Outpatient  Further Equipment Recommendations for Discharge:  N/A     SUBJECTIVE:   Patient stated I think I'm supposed to go home today.     OBJECTIVE DATA SUMMARY:   Critical Behavior:  Neurologic State: Alert, Appropriate for age  Orientation Level: Oriented X4  Cognition: Follows commands  Safety/Judgement: Fall prevention, Awareness of environment  Functional Mobility Training:  Transfers:  Sit to Stand: Modified independent  Stand to Sit: Modified independent         Balance:  Sitting: Intact  Standing: Intact  Standing - Static: Good  Standing - Dynamic : Good   Range Of Motion:    [facility]        Ambulation/Gait Training:  Distance (ft): 80 Feet (ft)  Assistive Device: (no AD)  Ambulation - Level of Assistance: Modified independent      Base of Support: Widened      Stairs:   NT                Pain:  Pain level pre-treatment: 0/10  Pain level post-treatment: 0/10   Pain Intervention(s): Medication (see MAR); Rest, Ice, Repositioning NA   Response to intervention: Nurse notified, See doc flow     Activity Tolerance:   good  Please refer to the flowsheet for vital signs taken during this treatment. After treatment:   [] Patient left in no apparent distress sitting up in chair  [x] Patient left in no apparent distress in bed  [x] Call bell left within reach  [] Nursing notified  [] Caregiver present  [] Bed alarm activated  [] SCDs applied      COMMUNICATION/EDUCATION:   []         Role of Physical Therapy in the acute care setting. []         Fall prevention education was provided and the patient/caregiver indicated understanding. []         Patient/family have participated as able in working toward goals and plan of care. []         Patient/family agree to work toward stated goals and plan of care. []         Patient understands intent and goals of therapy, but is neutral about his/her participation.   []         Patient is unable to participate in stated goals/plan of care: ongoing with therapy staff.   [x]         Other: energy conservation        Ally Lucas, KYLE   Time Calculation: 22 mins

## 2020-03-09 NOTE — PROGRESS NOTES
Problem: Falls - Risk of  Goal: *Absence of Falls  Description  Document Bishop Mendez Fall Risk and appropriate interventions in the flowsheet. Outcome: Progressing Towards Goal  Note: Fall Risk Interventions:  Mobility Interventions: Patient to call before getting OOB         Medication Interventions: Evaluate medications/consider consulting pharmacy, Patient to call before getting OOB, Teach patient to arise slowly    Elimination Interventions: Call light in reach, Patient to call for help with toileting needs, Stay With Me (per policy), Toileting schedule/hourly rounds, Toilet paper/wipes in reach    History of Falls Interventions: Bed/chair exit alarm, Door open when patient unattended, Evaluate medications/consider consulting pharmacy, Room close to nurse's station         Problem: Patient Education: Go to Patient Education Activity  Goal: Patient/Family Education  Outcome: Progressing Towards Goal     Problem: Chronic Obstructive Pulmonary Disease (COPD)  Goal: *Oxygen saturation during activity within specified parameters  Outcome: Progressing Towards Goal  Goal: *Able to remain out of bed as prescribed  Outcome: Progressing Towards Goal  Goal: *Absence of hypoxia  Outcome: Progressing Towards Goal  Goal: *Optimize nutritional status  Outcome: Progressing Towards Goal     Problem: Patient Education: Go to Patient Education Activity  Goal: Patient/Family Education  Outcome: Progressing Towards Goal     Problem: Pain  Goal: *Control of Pain  Outcome: Progressing Towards Goal  Goal: *PALLIATIVE CARE:  Alleviation of Pain  Outcome: Progressing Towards Goal     Problem: Diabetes Self-Management  Goal: *Disease process and treatment process  Description  Define diabetes and identify own type of diabetes; list 3 options for treating diabetes.   Outcome: Progressing Towards Goal  Goal: *Incorporating nutritional management into lifestyle  Description  Describe effect of type, amount and timing of food on blood glucose; list 3 methods for planning meals. Outcome: Progressing Towards Goal  Goal: *Incorporating physical activity into lifestyle  Description  State effect of exercise on blood glucose levels. Outcome: Progressing Towards Goal  Goal: *Developing strategies to promote health/change behavior  Description  Define the ABC's of diabetes; identify appropriate screenings, schedule and personal plan for screenings. Outcome: Progressing Towards Goal  Goal: *Using medications safely  Description  State effect of diabetes medications on diabetes; name diabetes medication taking, action and side effects. Outcome: Progressing Towards Goal  Goal: *Monitoring blood glucose, interpreting and using results  Description  Identify recommended blood glucose targets  and personal targets. Outcome: Progressing Towards Goal  Goal: *Prevention, detection, treatment of acute complications  Description  List symptoms of hyper- and hypoglycemia; describe how to treat low blood sugar and actions for lowering  high blood glucose level. Outcome: Progressing Towards Goal  Goal: *Prevention, detection and treatment of chronic complications  Description  Define the natural course of diabetes and describe the relationship of blood glucose levels to long term complications of diabetes.   Outcome: Progressing Towards Goal  Goal: *Developing strategies to address psychosocial issues  Description  Describe feelings about living with diabetes; identify support needed and support network  Outcome: Progressing Towards Goal  Goal: *Insulin pump training  Outcome: Progressing Towards Goal  Goal: *Sick day guidelines  Outcome: Progressing Towards Goal  Goal: *Patient Specific Goal (EDIT GOAL, INSERT TEXT)  Outcome: Progressing Towards Goal     Problem: Patient Education: Go to Patient Education Activity  Goal: Patient/Family Education  Outcome: Progressing Towards Goal     Problem: Patient Education: Go to Patient Education Activity  Goal: Patient/Family Education  Outcome: Progressing Towards Goal     Problem: Pressure Injury - Risk of  Goal: *Prevention of pressure injury  Description  Document Viktor Scale and appropriate interventions in the flowsheet.   Outcome: Progressing Towards Goal  Note: Pressure Injury Interventions:  Sensory Interventions: Assess changes in LOC, Assess need for specialty bed, Check visual cues for pain, Keep linens dry and wrinkle-free, Monitor skin under medical devices         Activity Interventions: Increase time out of bed    Mobility Interventions: Assess need for specialty bed, Pressure redistribution bed/mattress (bed type)    Nutrition Interventions: Document food/fluid/supplement intake    Friction and Shear Interventions: Minimize layers                Problem: Patient Education: Go to Patient Education Activity  Goal: Patient/Family Education  Outcome: Progressing Towards Goal     Problem: Patient Education:  Go to Education Activity  Goal: Patient/Family Education  Outcome: Progressing Towards Goal

## 2020-03-13 ENCOUNTER — HOSPITAL ENCOUNTER (EMERGENCY)
Age: 60
Discharge: HOME | End: 2020-03-14
Attending: EMERGENCY MEDICINE
Payer: MEDICARE

## 2020-03-13 ENCOUNTER — APPOINTMENT (OUTPATIENT)
Dept: GENERAL RADIOLOGY | Age: 60
End: 2020-03-13
Attending: EMERGENCY MEDICINE
Payer: MEDICARE

## 2020-03-13 DIAGNOSIS — J44.1 COPD EXACERBATION (HCC): Primary | ICD-10-CM

## 2020-03-13 LAB
ALBUMIN SERPL-MCNC: 3.2 G/DL (ref 3.4–5)
ALBUMIN/GLOB SERPL: 0.8 {RATIO} (ref 0.8–1.7)
ALP SERPL-CCNC: 80 U/L (ref 45–117)
ALT SERPL-CCNC: 39 U/L (ref 13–56)
ANION GAP SERPL CALC-SCNC: 4 MMOL/L (ref 3–18)
ARTERIAL PATENCY WRIST A: YES
AST SERPL-CCNC: 21 U/L (ref 10–38)
BASE EXCESS BLD CALC-SCNC: 5 MMOL/L
BASOPHILS # BLD: 0 K/UL (ref 0–0.1)
BASOPHILS NFR BLD: 0 % (ref 0–2)
BDY SITE: ABNORMAL
BILIRUB SERPL-MCNC: 0.4 MG/DL (ref 0.2–1)
BUN SERPL-MCNC: 13 MG/DL (ref 7–18)
BUN/CREAT SERPL: 13 (ref 12–20)
CALCIUM SERPL-MCNC: 9 MG/DL (ref 8.5–10.1)
CHLORIDE SERPL-SCNC: 105 MMOL/L (ref 100–111)
CO2 SERPL-SCNC: 29 MMOL/L (ref 21–32)
CREAT SERPL-MCNC: 0.97 MG/DL (ref 0.6–1.3)
DIFFERENTIAL METHOD BLD: NORMAL
EOSINOPHIL # BLD: 0.1 K/UL (ref 0–0.4)
EOSINOPHIL NFR BLD: 2 % (ref 0–5)
ERYTHROCYTE [DISTWIDTH] IN BLOOD BY AUTOMATED COUNT: 13.7 % (ref 11.6–14.5)
GAS FLOW.O2 O2 DELIVERY SYS: ABNORMAL L/MIN
GAS FLOW.O2 SETTING OXYMISER: 2.5 L/M
GLOBULIN SER CALC-MCNC: 3.8 G/DL (ref 2–4)
GLUCOSE SERPL-MCNC: 213 MG/DL (ref 74–99)
HCO3 BLD-SCNC: 29.9 MMOL/L (ref 22–26)
HCT VFR BLD AUTO: 39 % (ref 35–45)
HGB BLD-MCNC: 13.2 G/DL (ref 12–16)
LYMPHOCYTES # BLD: 2.4 K/UL (ref 0.9–3.6)
LYMPHOCYTES NFR BLD: 29 % (ref 21–52)
MCH RBC QN AUTO: 29.6 PG (ref 24–34)
MCHC RBC AUTO-ENTMCNC: 33.8 G/DL (ref 31–37)
MCV RBC AUTO: 87.4 FL (ref 74–97)
MONOCYTES # BLD: 0.6 K/UL (ref 0.05–1.2)
MONOCYTES NFR BLD: 7 % (ref 3–10)
NEUTS SEG # BLD: 4.9 K/UL (ref 1.8–8)
NEUTS SEG NFR BLD: 62 % (ref 40–73)
O2/TOTAL GAS SETTING VFR VENT: 26 %
PCO2 BLD: 45.4 MMHG (ref 35–45)
PH BLD: 7.43 [PH] (ref 7.35–7.45)
PLATELET # BLD AUTO: 271 K/UL (ref 135–420)
PMV BLD AUTO: 9.5 FL (ref 9.2–11.8)
PO2 BLD: 100 MMHG (ref 80–100)
POTASSIUM SERPL-SCNC: 4.1 MMOL/L (ref 3.5–5.5)
PROT SERPL-MCNC: 7 G/DL (ref 6.4–8.2)
RBC # BLD AUTO: 4.46 M/UL (ref 4.2–5.3)
SAO2 % BLD: 98 % (ref 92–97)
SERVICE CMNT-IMP: ABNORMAL
SODIUM SERPL-SCNC: 138 MMOL/L (ref 136–145)
SPECIMEN TYPE: ABNORMAL
TOTAL RESP. RATE, ITRR: 24
WBC # BLD AUTO: 8.1 K/UL (ref 4.6–13.2)

## 2020-03-13 PROCEDURE — 80053 COMPREHEN METABOLIC PANEL: CPT

## 2020-03-13 PROCEDURE — 36600 WITHDRAWAL OF ARTERIAL BLOOD: CPT

## 2020-03-13 PROCEDURE — 85025 COMPLETE CBC W/AUTO DIFF WBC: CPT

## 2020-03-13 PROCEDURE — 93005 ELECTROCARDIOGRAM TRACING: CPT

## 2020-03-13 PROCEDURE — 96365 THER/PROPH/DIAG IV INF INIT: CPT

## 2020-03-13 PROCEDURE — 94640 AIRWAY INHALATION TREATMENT: CPT

## 2020-03-13 PROCEDURE — 74011250636 HC RX REV CODE- 250/636: Performed by: PHYSICIAN ASSISTANT

## 2020-03-13 PROCEDURE — 99285 EMERGENCY DEPT VISIT HI MDM: CPT

## 2020-03-13 PROCEDURE — 74011000250 HC RX REV CODE- 250: Performed by: PHYSICIAN ASSISTANT

## 2020-03-13 PROCEDURE — 71045 X-RAY EXAM CHEST 1 VIEW: CPT

## 2020-03-13 PROCEDURE — 96375 TX/PRO/DX INJ NEW DRUG ADDON: CPT

## 2020-03-13 PROCEDURE — 82803 BLOOD GASES ANY COMBINATION: CPT

## 2020-03-13 RX ORDER — ALBUTEROL SULFATE 0.83 MG/ML
5 SOLUTION RESPIRATORY (INHALATION)
Status: COMPLETED | OUTPATIENT
Start: 2020-03-13 | End: 2020-03-13

## 2020-03-13 RX ORDER — IPRATROPIUM BROMIDE AND ALBUTEROL SULFATE 2.5; .5 MG/3ML; MG/3ML
3 SOLUTION RESPIRATORY (INHALATION) ONCE
Status: COMPLETED | OUTPATIENT
Start: 2020-03-13 | End: 2020-03-13

## 2020-03-13 RX ORDER — MAGNESIUM SULFATE HEPTAHYDRATE 40 MG/ML
2 INJECTION, SOLUTION INTRAVENOUS
Status: COMPLETED | OUTPATIENT
Start: 2020-03-13 | End: 2020-03-13

## 2020-03-13 RX ADMIN — IPRATROPIUM BROMIDE AND ALBUTEROL SULFATE 3 ML: .5; 3 SOLUTION RESPIRATORY (INHALATION) at 22:29

## 2020-03-13 RX ADMIN — MAGNESIUM SULFATE HEPTAHYDRATE 2 G: 40 INJECTION, SOLUTION INTRAVENOUS at 22:34

## 2020-03-13 RX ADMIN — METHYLPREDNISOLONE SODIUM SUCCINATE 125 MG: 125 INJECTION, POWDER, FOR SOLUTION INTRAMUSCULAR; INTRAVENOUS at 22:34

## 2020-03-13 RX ADMIN — IPRATROPIUM BROMIDE AND ALBUTEROL SULFATE 3 ML: .5; 3 SOLUTION RESPIRATORY (INHALATION) at 22:55

## 2020-03-13 RX ADMIN — ALBUTEROL SULFATE 5 MG: 2.5 SOLUTION RESPIRATORY (INHALATION) at 22:55

## 2020-03-13 RX ADMIN — ALBUTEROL SULFATE 5 MG: 2.5 SOLUTION RESPIRATORY (INHALATION) at 23:10

## 2020-03-14 VITALS
DIASTOLIC BLOOD PRESSURE: 63 MMHG | TEMPERATURE: 97 F | OXYGEN SATURATION: 95 % | WEIGHT: 255 LBS | SYSTOLIC BLOOD PRESSURE: 147 MMHG | RESPIRATION RATE: 27 BRPM | HEART RATE: 93 BPM | BODY MASS INDEX: 45.17 KG/M2

## 2020-03-14 LAB
ATRIAL RATE: 85 BPM
CALCULATED P AXIS, ECG09: 40 DEGREES
CALCULATED R AXIS, ECG10: 32 DEGREES
CALCULATED T AXIS, ECG11: 55 DEGREES
DIAGNOSIS, 93000: NORMAL
FLUAV AG NPH QL IA: NEGATIVE
FLUBV AG NOSE QL IA: NEGATIVE
P-R INTERVAL, ECG05: 136 MS
Q-T INTERVAL, ECG07: 384 MS
QRS DURATION, ECG06: 86 MS
QTC CALCULATION (BEZET), ECG08: 456 MS
TROPONIN I SERPL-MCNC: <0.02 NG/ML (ref 0–0.04)
VENTRICULAR RATE, ECG03: 85 BPM

## 2020-03-14 PROCEDURE — 87804 INFLUENZA ASSAY W/OPTIC: CPT

## 2020-03-14 PROCEDURE — 84484 ASSAY OF TROPONIN QUANT: CPT

## 2020-03-14 NOTE — ED TRIAGE NOTES
Pt with copd on home o2 2l reports increased shortness of breath all day. Recent admission for copd exacerbation released 4 days ago. Currently taking steroids.   Able to speak a few words in triage with some difficulty

## 2020-03-14 NOTE — ED PROVIDER NOTES
[facility]  [facility] EMERGENCY DEPT      3:08 AM    Date: 3/13/2020  Patient Name: Amalia Moralez    History of Presenting Illness     Chief Complaint   Patient presents with    Shortness of Breath       60 y.o. female with a past medical history of COPD on 2L home O2, Asthma, and Hypertension presents to the ED for complaints of shortness of breath onset today. Patient notes being admitted here 4 days ago, reports worsening since that time. She states she has a dry cough, without any fever. States she has been taking her prednisone currently at the 40 mg daily dose as well as Augmentin. Patient has not had any travel or known exposure to [employer] [address]. Denies any chest pain, sputum production, or other symptoms at this time. Patient denies any other associated signs or symptoms. Patient denies any other complaints. Nursing notes regarding the HPI and triage nursing notes were reviewed. Prior medical records were reviewed. Current Outpatient Medications   Medication Sig Dispense Refill    predniSONE (DELTASONE) 20 mg tablet Take 60mg(3tabs) for 3 days, Take 40mg(2tabs) for 3 days, Take 20mg(1 tab) for 3 days, and then 10mg for 1 day. Then resume 10mg every other day. 20 Tab 0    amoxicillin-clavulanate (AUGMENTIN) 875-125 mg per tablet Take 1 Tab by mouth every twelve (12) hours. 2 Tab 0    lactobacillus sp. 50 billion cpu (BIO-K PLUS) 50 billion cell -375 mg cap capsule Take 1 Cap by mouth daily. 30 Cap 0    predniSONE (DELTASONE) 10 mg tablet Take 10 mg by mouth every other day.  PROLENSA 0.07 % ophthalmic solution Administer 1 Drop to right eye daily.  simethicone (GAS-X) 125 mg capsule Take 125 mg by mouth four (4) times daily as needed for Flatulence.  loratadine (CLARITIN) 10 mg tablet Take 10 mg by mouth daily as needed for Allergies.  lisinopril (PRINIVIL, ZESTRIL) 20 mg tablet Take 20 mg by mouth two (2) times a day.       albuterol sulfate (PROVENTIL;VENTOLIN) 2.5 mg/0.5 mL nebu nebulizer solution 0.5 mL by Nebulization route four (4) times daily as needed for Wheezing. To be used with HyperSal nebulizer solution. ICD code: COPD J44.1 60 mL 3    fluticasone propionate (FLONASE ALLERGY RELIEF) 50 mcg/actuation nasal spray 2 Sprays by Both Nostrils route daily.  fluticasone propion-salmeterol (ADVAIR HFA) 230-21 mcg/actuation inhaler Take 2 Puffs by inhalation two (2) times a day.  hydroCHLOROthiazide (HYDRODIURIL) 12.5 mg tablet Take 12.5 mg by mouth daily.  tiotropium bromide (SPIRIVA RESPIMAT) 2.5 mcg/actuation inhaler Take 2 Puffs by inhalation daily.  insulin glargine (LANTUS,BASAGLAR) 100 unit/mL (3 mL) inpn 35 Units by SubCUTAneous route nightly. 28 Units 0    albuterol sulfate 90 mcg/actuation aepb Take 1 Puff by inhalation every four (4) hours. (Patient taking differently: Take 1 Puff by inhalation every four (4) hours as needed.) 1 Inhaler 0    NOVOLOG FLEXPEN U-100 INSULIN 100 unit/mL inpn Continue home Sliding scale insulin as prior to admission (Patient taking differently: 1 Units by SubCUTAneous route three (3) times daily. If BG <100=0u  101-150=5u  151-250=8u  251-300=12u  >300 call MD  ) 1 Pen 0    omeprazole (PRILOSEC) 40 mg capsule Take 40 mg by mouth daily. Indications: gastroesophageal reflux disease      OXYGEN-AIR DELIVERY SYSTEMS 2 L by Nasal route continuous. [employer]      aspirin delayed-release 81 mg tablet Take 81 mg by mouth daily.  famotidine (PEPCID) 20 mg tablet Take 20 mg by mouth two (2) times daily as needed for Other (reflux).  montelukast (SINGULAIR) 10 mg tablet Take 10 mg by mouth nightly.          Past History     Past Medical History:  Past Medical History:   Diagnosis Date    Asthma     Chronic lung disease     COPD     Cystocele, midline     Diabetes mellitus ([facility] [address]     GERD (gastroesophageal reflux disease)     Hidradenitis suppurativa     Hyperlipidemia     Hypertension     SHERRIE on CPAP     CPAP    Stress incontinence        Past Surgical History:  Past Surgical History:   Procedure Laterality Date    BREAST SURGERY PROCEDURE UNLISTED      Right breast benign tumor removal    HX APPENDECTOMY      HX CHOLECYSTECTOMY      HX DILATION AND CURETTAGE      Dysfunctional uterine bleeding, thought 2/2 fibroids    HX TUBAL LIGATION         Family History:  Family History   Problem Relation Age of Onset    Hypertension Mother     Stroke Mother     Breast Cancer Mother         Bilateral mastectomies    Cancer Mother         ovarian and breast    Heart Failure Mother     Heart Attack Father         2011    Heart Surgery Father         CABG    Heart Failure Father     COPD Sister         Heavy smoker    Cancer Sister         ovarian    Heart Failure Sister     Lung Disease Sister     Asthma Child     Cancer Maternal Aunt         pancreatic    Cancer Maternal Grandfather         stomach       Social History:  Social History     Tobacco Use    Smoking status: Former Smoker     Packs/day: 1.00     Years: 30.00     Pack years: 30.00     Types: Cigarettes     Start date: 1966     Last attempt to quit: 2006     Years since quittin.5    Smokeless tobacco: Never Used    Tobacco comment: 1-1.5 packs per day   Substance Use Topics    Alcohol use: No    Drug use: No       Allergies: Allergies   Allergen Reactions    Ancef [Cefazolin] Hives    Contrast Agent [Iodine] Anaphylaxis, Shortness of Breath and Swelling     Needs pre-medication for IV contrast with Benadryl, Solu-Medrol    Fish Containing Products Anaphylaxis     Pt states she had a severe allergic reaction at 7 y/o.  Statins-Hmg-Coa Reductase Inhibitors Myalgia    Metformin Other (comments)     Abdominal pain, diarrhea.  Codeine Other (comments)     Altered mental status       Patient's primary care provider (as noted in EPIC):   Michael Mendes MD    Review of Systems   Constitutional:  Denies malaise, fever, chills. ENMT:  Denies nasal congestion, sore throat. Neck:  Denies injury or pain. Cardiac:  Denies chest pain or palpitations. Respiratory: + dry cough, SOB, wheezing. GI/ABD:  Denies injury, pain, distention, nausea, vomiting, diarrhea. Neuro:  Denies headache, LOC, dizziness, neurologic symptoms/deficits/paresthesias. Skin: Denies injury, rash, itching or skin changes. All other systems negative as reviewed. Visit Vitals  /63   Pulse 93   Temp 97 °F (36.1 °C)   Resp 27   Wt 115.7 kg (255 lb)   SpO2 95%   BMI 45.17 kg/m²       PHYSICAL EXAM:    CONSTITUTIONAL:  Alert, appears in respiratory distress, speaking in few word phrases, morbidly obese. HEAD:  Normocephalic, atraumatic. EYES:  EOMI. Non-icteric sclera. Normal conjunctiva. ENTM:  Nose:  no rhinorrhea. Throat:  no erythema or exudate, mucous membranes moist.  NECK: Supple  RESPIRATORY: Diffuse expiratory wheezes noted, with decreased air movement. Speaking in 3 word phrases, tachypnea present. CARDIOVASCULAR:  Regular rate and rhythm. No murmurs, rubs, or gallops. UPPER EXT:  Normal inspection. NEURO:  Moves all four extremities, and grossly normal motor exam.  SKIN:  No rashes;  Normal for age. PSYCH:  Alert and normal affect. DIFFERENTIAL DIAGNOSES/ MEDICAL DECISION MAKING:   Shortness of breath etiologies include chronic obstructive pulmonary disease (COPD), acute asthma exacerbation, congestive heart failure, pneumonia, acute bronchitis, upper respiratory infection, cardiac event to include acute coronary syndrome, acute myocardial infarction or a combination of the above (ex URI on top of COPD thus causing respiratory distress).     Recent Results (from the past 12 hour(s))   EKG, 12 LEAD, INITIAL    Collection Time: 03/13/20 10:11 PM   Result Value Ref Range    Ventricular Rate 85 BPM    Atrial Rate 85 BPM    P-R Interval 136 ms    QRS Duration 86 ms    Q-T Interval 384 ms    QTC Calculation (Bezet) 456 ms Calculated P Axis 40 degrees    Calculated R Axis 32 degrees    Calculated T Axis 55 degrees    Diagnosis       Normal sinus rhythm  Low voltage QRS  Cannot rule out Anterior infarct , age undetermined  Abnormal ECG  When compared with ECG of 04-MAR-2020 18:10,  No significant change was found     POC G3    Collection Time: 03/13/20 10:28 PM   Result Value Ref Range    Device: NASAL CANNULA      Flow rate (POC) 2.5 L/M    FIO2 (POC) 26 %    pH (POC) 7.427 7.35 - 7.45      pCO2 (POC) 45.4 (H) 35.0 - 45.0 MMHG    pO2 (POC) 100 80 - 100 MMHG    HCO3 (POC) 29.9 (H) 22 - 26 MMOL/L    sO2 (POC) 98 (H) 92 - 97 %    Base excess (POC) 5 mmol/L    Allens test (POC) YES      Total resp. rate 24      Site LEFT RADIAL      Specimen type (POC) ARTERIAL      Performed by Carole Desai    CBC WITH AUTOMATED DIFF    Collection Time: 03/13/20 10:29 PM   Result Value Ref Range    WBC 8.1 4.6 - 13.2 K/uL    RBC 4.46 4.20 - 5.30 M/uL    HGB 13.2 12.0 - 16.0 g/dL    HCT 39.0 35.0 - 45.0 %    MCV 87.4 74.0 - 97.0 FL    MCH 29.6 24.0 - 34.0 PG    MCHC 33.8 31.0 - 37.0 g/dL    RDW 13.7 11.6 - 14.5 %    PLATELET 228 720 - 980 K/uL    MPV 9.5 9.2 - 11.8 FL    NEUTROPHILS 62 40 - 73 %    LYMPHOCYTES 29 21 - 52 %    MONOCYTES 7 3 - 10 %    EOSINOPHILS 2 0 - 5 %    BASOPHILS 0 0 - 2 %    ABS. NEUTROPHILS 4.9 1.8 - 8.0 K/UL    ABS. LYMPHOCYTES 2.4 0.9 - 3.6 K/UL    ABS. MONOCYTES 0.6 0.05 - 1.2 K/UL    ABS. EOSINOPHILS 0.1 0.0 - 0.4 K/UL    ABS.  BASOPHILS 0.0 0.0 - 0.1 K/UL    DF AUTOMATED     METABOLIC PANEL, COMPREHENSIVE    Collection Time: 03/13/20 10:29 PM   Result Value Ref Range    Sodium 138 136 - 145 mmol/L    Potassium 4.1 3.5 - 5.5 mmol/L    Chloride 105 100 - 111 mmol/L    CO2 29 21 - 32 mmol/L    Anion gap 4 3.0 - 18 mmol/L    Glucose 213 (H) 74 - 99 mg/dL    BUN 13 7.0 - 18 MG/DL    Creatinine 0.97 0.6 - 1.3 MG/DL    BUN/Creatinine ratio 13 12 - 20      GFR est AA >60 >60 ml/min/1.73m2    GFR est non-AA 59 (L) >60 ml/min/1.73m2 Calcium 9.0 8.5 - 10.1 MG/DL    Bilirubin, total 0.4 0.2 - 1.0 MG/DL    ALT (SGPT) 39 13 - 56 U/L    AST (SGOT) 21 10 - 38 U/L    Alk. phosphatase 80 45 - 117 U/L    Protein, total 7.0 6.4 - 8.2 g/dL    Albumin 3.2 (L) 3.4 - 5.0 g/dL    Globulin 3.8 2.0 - 4.0 g/dL    A-G Ratio 0.8 0.8 - 1.7     TROPONIN I    Collection Time: 03/14/20  1:52 AM   Result Value Ref Range    Troponin-I, QT <0.02 0.0 - 0.045 NG/ML   INFLUENZA A & B AG (RAPID TEST)    Collection Time: 03/14/20  1:52 AM   Result Value Ref Range    Influenza A Antigen NEGATIVE  NEG      Influenza B Antigen NEGATIVE  NEG          ED COURSE:    Critical Care Note:  Respiratory Distress    Critical care minutes: 35 MINUTES. Given patient's initial arrival in respiratory distress, numerous serial reevaluations of the patient's respiratory status and patient's response to various medical interventions. Given the patients underlying condition medical intervention(s) were needed, requiring numerous reevaluations of patient's vital signs and response to different emergency department therapies, total bedside time evaluating and/or treating the patient, not including procedures, is noted below. IMPRESSION AND MEDICAL DECISION MAKING:  Based upon the patient's presentation with noted HPI and PE, along with the work up done in the emergency department, I believe that the patient is having an acute COPD exacerbation. Patient's ABG and labs look well, as do her vitals. Her lung sounds were clear to auscultation following treatments. Patient states however, she is still short of breath and not talking back to her baseline. I discussed this patient with my attending, who recommend a consult with [facility]. 1:20 AM consult with Eligio Mitchell family resident, who states she and the chief resident will come and see the patient.     1:40 AM Sakshi Lopez MD, [place] family resident came and evaluated the patient, he is stating this is the patient's baseline, he is familiar with her, and this is how she always speaks. He is directing for discharge home, patient to continue with her antibiotics and steroid and they will see her in the office on Monday. 1:50 AM reevaluated the patient, she states she is back to her baseline, feeling much improved at this time. Lung sounds are still clear to auscultation, vitals look well. O2 sat 95%, RR 21, pt not in any respiratory distress. I have discussed this patient with my attending, Dr. Mica Peña, who agrees with the assessment and plan for discharge home at this time. Diagnosis:   1. COPD exacerbation ([address]      Disposition: Discharge    Follow-up Information     Follow up With Specialties Details Why Contact Info    Nikki Vasquez MD [facility] In 3 days  [facility] [address] [facility] [address]      [address] [facility] EMERGENCY DEPT Emergency Medicine  If symptoms worsen [address] [place] [id number]  [phone]          Discharge Medication List as of 3/14/2020  2:13 AM      CONTINUE these medications which have NOT CHANGED    Details   ! ! predniSONE (DELTASONE) 20 mg tablet Take 60mg(3tabs) for 3 days, Take 40mg(2tabs) for 3 days, Take 20mg(1 tab) for 3 days, and then 10mg for 1 day. Then resume 10mg every other day., Print, Disp-20 Tab, R-0      amoxicillin-clavulanate (AUGMENTIN) 875-125 mg per tablet Take 1 Tab by mouth every twelve (12) hours. , Print, Disp-2 Tab, R-0      lactobacillus sp. 50 billion cpu (BIO-K PLUS) 50 billion cell -375 mg cap capsule Take 1 Cap by mouth daily. , Print, Disp-30 Cap, R-0      !! predniSONE (DELTASONE) 10 mg tablet Take 10 mg by mouth every other day., Historical Med      PROLENSA 0.07 % ophthalmic solution Administer 1 Drop to right eye daily. , Historical Med, ERLINDA      simethicone (GAS-X) 125 mg capsule Take 125 mg by mouth four (4) times daily as needed for Flatulence., Historical Med      loratadine (CLARITIN) 10 mg tablet Take 10 mg by mouth daily as needed for Allergies. , Historical Med      lisinopril (PRINIVIL, ZESTRIL) 20 mg tablet Take 20 mg by mouth two (2) times a day., Historical Med      albuterol sulfate (PROVENTIL;VENTOLIN) 2.5 mg/0.5 mL nebu nebulizer solution 0.5 mL by Nebulization route four (4) times daily as needed for Wheezing. To be used with HyperSal nebulizer solution. ICD code: COPD J44.1, Print, Disp-60 mL, R-3      fluticasone propionate (FLONASE ALLERGY RELIEF) 50 mcg/actuation nasal spray 2 Sprays by Both Nostrils route daily. , Historical Med      fluticasone propion-salmeterol (ADVAIR HFA) 230-21 mcg/actuation inhaler Take 2 Puffs by inhalation two (2) times a day., Historical Med      hydroCHLOROthiazide (HYDRODIURIL) 12.5 mg tablet Take 12.5 mg by mouth daily. , Historical Med      tiotropium bromide (SPIRIVA RESPIMAT) 2.5 mcg/actuation inhaler Take 2 Puffs by inhalation daily. , Historical Med      insulin glargine (LANTUS,BASAGLAR) 100 unit/mL (3 mL) inpn 35 Units by SubCUTAneous route nightly., Normal, Disp-28 Units, R-0      albuterol sulfate 90 mcg/actuation aepb Take 1 Puff by inhalation every four (4) hours. , Print, Disp-1 Inhaler, R-0      NOVOLOG FLEXPEN U-100 INSULIN 100 unit/mL inpn Continue home Sliding scale insulin as prior to admission, Print, Disp-1 Pen, R-0, ERLINDA      omeprazole (PRILOSEC) 40 mg capsule Take 40 mg by mouth daily. Indications: gastroesophageal reflux disease, Historical Med      OXYGEN-AIR DELIVERY SYSTEMS 2 L by Nasal route continuous. First Choice, Historical Med      aspirin delayed-release 81 mg tablet Take 81 mg by mouth daily. , Historical Med      famotidine (PEPCID) 20 mg tablet Take 20 mg by mouth two (2) times daily as needed for Other (reflux). , Historical Med      montelukast (SINGULAIR) 10 mg tablet Take 10 mg by mouth nightly., Historical Med       !! - Potential duplicate medications found. Please discuss with provider.         ANALI Juarez

## 2020-03-14 NOTE — DISCHARGE INSTRUCTIONS

## 2020-03-23 ENCOUNTER — APPOINTMENT (OUTPATIENT)
Dept: GENERAL RADIOLOGY | Age: 60
DRG: 189 | End: 2020-03-23
Attending: PHYSICIAN ASSISTANT
Payer: MEDICARE

## 2020-03-23 ENCOUNTER — HOSPITAL ENCOUNTER (INPATIENT)
Age: 60
LOS: 2 days | Discharge: HOME | DRG: 189 | End: 2020-03-25
Attending: EMERGENCY MEDICINE | Admitting: FAMILY MEDICINE
Payer: MEDICARE

## 2020-03-23 DIAGNOSIS — J44.1 ACUTE EXACERBATION OF CHRONIC OBSTRUCTIVE PULMONARY DISEASE (COPD) (HCC): ICD-10-CM

## 2020-03-23 DIAGNOSIS — R06.03 RESPIRATORY DISTRESS: Primary | ICD-10-CM

## 2020-03-23 LAB
ALBUMIN SERPL-MCNC: 3.4 G/DL (ref 3.4–5)
ALBUMIN/GLOB SERPL: 0.9 {RATIO} (ref 0.8–1.7)
ALP SERPL-CCNC: 83 U/L (ref 45–117)
ALT SERPL-CCNC: 67 U/L (ref 13–56)
ANION GAP SERPL CALC-SCNC: 3 MMOL/L (ref 3–18)
ARTERIAL PATENCY WRIST A: YES
AST SERPL-CCNC: 30 U/L (ref 10–38)
BASE EXCESS BLD CALC-SCNC: 3 MMOL/L
BASOPHILS # BLD: 0 K/UL (ref 0–0.1)
BASOPHILS NFR BLD: 0 % (ref 0–2)
BDY SITE: ABNORMAL
BILIRUB SERPL-MCNC: 0.5 MG/DL (ref 0.2–1)
BNP SERPL-MCNC: 35 PG/ML (ref 0–900)
BUN SERPL-MCNC: 9 MG/DL (ref 7–18)
BUN/CREAT SERPL: 10 (ref 12–20)
CALCIUM SERPL-MCNC: 8.9 MG/DL (ref 8.5–10.1)
CHLORIDE SERPL-SCNC: 103 MMOL/L (ref 100–111)
CO2 SERPL-SCNC: 30 MMOL/L (ref 21–32)
CREAT SERPL-MCNC: 0.9 MG/DL (ref 0.6–1.3)
DIFFERENTIAL METHOD BLD: ABNORMAL
EOSINOPHIL # BLD: 0.2 K/UL (ref 0–0.4)
EOSINOPHIL NFR BLD: 3 % (ref 0–5)
ERYTHROCYTE [DISTWIDTH] IN BLOOD BY AUTOMATED COUNT: 13.6 % (ref 11.6–14.5)
GAS FLOW.O2 O2 DELIVERY SYS: ABNORMAL L/MIN
GLOBULIN SER CALC-MCNC: 3.9 G/DL (ref 2–4)
GLUCOSE BLD STRIP.AUTO-MCNC: 146 MG/DL (ref 70–110)
GLUCOSE SERPL-MCNC: 320 MG/DL (ref 74–99)
HCO3 BLD-SCNC: 28.9 MMOL/L (ref 22–26)
HCT VFR BLD AUTO: 38 % (ref 35–45)
HGB BLD-MCNC: 13.1 G/DL (ref 12–16)
LYMPHOCYTES # BLD: 1.5 K/UL (ref 0.9–3.6)
LYMPHOCYTES NFR BLD: 28 % (ref 21–52)
MAGNESIUM SERPL-MCNC: 1.6 MG/DL (ref 1.6–2.6)
MCH RBC QN AUTO: 29.8 PG (ref 24–34)
MCHC RBC AUTO-ENTMCNC: 34.5 G/DL (ref 31–37)
MCV RBC AUTO: 86.4 FL (ref 74–97)
MONOCYTES # BLD: 0.4 K/UL (ref 0.05–1.2)
MONOCYTES NFR BLD: 8 % (ref 3–10)
NEUTS SEG # BLD: 3.3 K/UL (ref 1.8–8)
NEUTS SEG NFR BLD: 61 % (ref 40–73)
O2/TOTAL GAS SETTING VFR VENT: 35 %
PCO2 BLD: 51.4 MMHG (ref 35–45)
PEEP RESPIRATORY: 6 CMH2O
PH BLD: 7.36 [PH] (ref 7.35–7.45)
PIP ISTAT,IPIP: 14
PLATELET # BLD AUTO: 240 K/UL (ref 135–420)
PMV BLD AUTO: 9 FL (ref 9.2–11.8)
PO2 BLD: 150 MMHG (ref 80–100)
POTASSIUM SERPL-SCNC: 4 MMOL/L (ref 3.5–5.5)
PRESSURE SUPPORT SETTING VENT: 8 CMH2O
PROT SERPL-MCNC: 7.3 G/DL (ref 6.4–8.2)
RBC # BLD AUTO: 4.4 M/UL (ref 4.2–5.3)
SAO2 % BLD: 99 % (ref 92–97)
SERVICE CMNT-IMP: 35 L/MIN
SERVICE CMNT-IMP: ABNORMAL
SODIUM SERPL-SCNC: 136 MMOL/L (ref 136–145)
SPECIMEN TYPE: ABNORMAL
SPONTANEOUS TIMED, IST: YES
TOTAL RESP. RATE, ITRR: 24
TROPONIN I SERPL-MCNC: <0.02 NG/ML (ref 0–0.04)
WBC # BLD AUTO: 5.4 K/UL (ref 4.6–13.2)

## 2020-03-23 PROCEDURE — 82962 GLUCOSE BLOOD TEST: CPT

## 2020-03-23 PROCEDURE — 94762 N-INVAS EAR/PLS OXIMTRY CONT: CPT

## 2020-03-23 PROCEDURE — 77010033711 HC HIGH FLOW OXYGEN

## 2020-03-23 PROCEDURE — 74011250636 HC RX REV CODE- 250/636: Performed by: STUDENT IN AN ORGANIZED HEALTH CARE EDUCATION/TRAINING PROGRAM

## 2020-03-23 PROCEDURE — 74011250637 HC RX REV CODE- 250/637: Performed by: STUDENT IN AN ORGANIZED HEALTH CARE EDUCATION/TRAINING PROGRAM

## 2020-03-23 PROCEDURE — 94660 CPAP INITIATION&MGMT: CPT

## 2020-03-23 PROCEDURE — 74011000250 HC RX REV CODE- 250: Performed by: EMERGENCY MEDICINE

## 2020-03-23 PROCEDURE — 65660000004 HC RM CVT STEPDOWN

## 2020-03-23 PROCEDURE — 99284 EMERGENCY DEPT VISIT MOD MDM: CPT

## 2020-03-23 PROCEDURE — 82803 BLOOD GASES ANY COMBINATION: CPT

## 2020-03-23 PROCEDURE — 85025 COMPLETE CBC W/AUTO DIFF WBC: CPT

## 2020-03-23 PROCEDURE — 84484 ASSAY OF TROPONIN QUANT: CPT

## 2020-03-23 PROCEDURE — 83735 ASSAY OF MAGNESIUM: CPT

## 2020-03-23 PROCEDURE — 71045 X-RAY EXAM CHEST 1 VIEW: CPT

## 2020-03-23 PROCEDURE — 36600 WITHDRAWAL OF ARTERIAL BLOOD: CPT

## 2020-03-23 PROCEDURE — 83880 ASSAY OF NATRIURETIC PEPTIDE: CPT

## 2020-03-23 PROCEDURE — 80053 COMPREHEN METABOLIC PANEL: CPT

## 2020-03-23 PROCEDURE — 5A09357 ASSISTANCE WITH RESPIRATORY VENTILATION, LESS THAN 24 CONSECUTIVE HOURS, CONTINUOUS POSITIVE AIRWAY PRESSURE: ICD-10-PCS | Performed by: FAMILY MEDICINE

## 2020-03-23 RX ORDER — DEXTROSE, SODIUM CHLORIDE, AND POTASSIUM CHLORIDE 5; .45; .15 G/100ML; G/100ML; G/100ML
125 INJECTION INTRAVENOUS CONTINUOUS
Status: DISPENSED | OUTPATIENT
Start: 2020-03-23 | End: 2020-03-24

## 2020-03-23 RX ORDER — LISINOPRIL 20 MG/1
20 TABLET ORAL 2 TIMES DAILY
Status: DISCONTINUED | OUTPATIENT
Start: 2020-03-23 | End: 2020-03-25 | Stop reason: HOSPADM

## 2020-03-23 RX ORDER — PANTOPRAZOLE SODIUM 40 MG/1
40 TABLET, DELAYED RELEASE ORAL
Status: DISCONTINUED | OUTPATIENT
Start: 2020-03-24 | End: 2020-03-25 | Stop reason: HOSPADM

## 2020-03-23 RX ORDER — FAMOTIDINE 20 MG/1
20 TABLET, FILM COATED ORAL
Status: DISCONTINUED | OUTPATIENT
Start: 2020-03-23 | End: 2020-03-25 | Stop reason: HOSPADM

## 2020-03-23 RX ORDER — MONTELUKAST SODIUM 10 MG/1
10 TABLET ORAL
Status: DISCONTINUED | OUTPATIENT
Start: 2020-03-23 | End: 2020-03-25 | Stop reason: HOSPADM

## 2020-03-23 RX ORDER — IPRATROPIUM BROMIDE 0.5 MG/2.5ML
500 SOLUTION RESPIRATORY (INHALATION)
Status: DISCONTINUED | OUTPATIENT
Start: 2020-03-24 | End: 2020-03-25

## 2020-03-23 RX ORDER — HYDROCHLOROTHIAZIDE 25 MG/1
12.5 TABLET ORAL DAILY
Status: DISCONTINUED | OUTPATIENT
Start: 2020-03-24 | End: 2020-03-25 | Stop reason: HOSPADM

## 2020-03-23 RX ORDER — INSULIN GLARGINE 100 [IU]/ML
15 INJECTION, SOLUTION SUBCUTANEOUS DAILY
Status: DISCONTINUED | OUTPATIENT
Start: 2020-03-24 | End: 2020-03-25

## 2020-03-23 RX ORDER — LORATADINE 10 MG/1
10 TABLET ORAL
Status: DISCONTINUED | OUTPATIENT
Start: 2020-03-23 | End: 2020-03-25 | Stop reason: HOSPADM

## 2020-03-23 RX ORDER — METHYLPREDNISOLONE 4 MG/1
40 TABLET ORAL DAILY
Status: DISCONTINUED | OUTPATIENT
Start: 2020-03-24 | End: 2020-03-23

## 2020-03-23 RX ORDER — FLUTICASONE PROPIONATE 50 MCG
2 SPRAY, SUSPENSION (ML) NASAL DAILY
Status: DISCONTINUED | OUTPATIENT
Start: 2020-03-24 | End: 2020-03-25 | Stop reason: HOSPADM

## 2020-03-23 RX ORDER — HEPARIN SODIUM 5000 [USP'U]/ML
5000 INJECTION, SOLUTION INTRAVENOUS; SUBCUTANEOUS EVERY 8 HOURS
Status: DISCONTINUED | OUTPATIENT
Start: 2020-03-23 | End: 2020-03-25 | Stop reason: HOSPADM

## 2020-03-23 RX ORDER — DEXTROSE 50 % IN WATER (D50W) INTRAVENOUS SYRINGE
25-50 AS NEEDED
Status: DISCONTINUED | OUTPATIENT
Start: 2020-03-23 | End: 2020-03-24

## 2020-03-23 RX ORDER — KETOROLAC TROMETHAMINE 5 MG/ML
1 SOLUTION OPHTHALMIC 4 TIMES DAILY
Status: DISCONTINUED | OUTPATIENT
Start: 2020-03-24 | End: 2020-03-25 | Stop reason: HOSPADM

## 2020-03-23 RX ORDER — IPRATROPIUM BROMIDE AND ALBUTEROL SULFATE 2.5; .5 MG/3ML; MG/3ML
3 SOLUTION RESPIRATORY (INHALATION)
Status: COMPLETED | OUTPATIENT
Start: 2020-03-23 | End: 2020-03-23

## 2020-03-23 RX ORDER — SIMETHICONE 125 MG
125 TABLET,CHEWABLE ORAL
Status: DISCONTINUED | OUTPATIENT
Start: 2020-03-23 | End: 2020-03-24

## 2020-03-23 RX ORDER — ACETAMINOPHEN 325 MG/1
650 TABLET ORAL
Status: DISCONTINUED | OUTPATIENT
Start: 2020-03-23 | End: 2020-03-25 | Stop reason: HOSPADM

## 2020-03-23 RX ORDER — IPRATROPIUM BROMIDE AND ALBUTEROL SULFATE 2.5; .5 MG/3ML; MG/3ML
3 SOLUTION RESPIRATORY (INHALATION)
Status: DISCONTINUED | OUTPATIENT
Start: 2020-03-23 | End: 2020-03-25 | Stop reason: HOSPADM

## 2020-03-23 RX ORDER — METHYLPREDNISOLONE 4 MG/1
60 TABLET ORAL DAILY
Status: DISCONTINUED | OUTPATIENT
Start: 2020-03-24 | End: 2020-03-25

## 2020-03-23 RX ORDER — BUDESONIDE 0.25 MG/2ML
250 INHALANT ORAL 2 TIMES DAILY
Status: DISCONTINUED | OUTPATIENT
Start: 2020-03-24 | End: 2020-03-24

## 2020-03-23 RX ORDER — INSULIN LISPRO 100 [IU]/ML
INJECTION, SOLUTION INTRAVENOUS; SUBCUTANEOUS
Status: DISCONTINUED | OUTPATIENT
Start: 2020-03-23 | End: 2020-03-25 | Stop reason: HOSPADM

## 2020-03-23 RX ORDER — MAGNESIUM SULFATE 100 %
16 CRYSTALS MISCELLANEOUS AS NEEDED
Status: DISCONTINUED | OUTPATIENT
Start: 2020-03-23 | End: 2020-03-25 | Stop reason: HOSPADM

## 2020-03-23 RX ORDER — ARFORMOTEROL TARTRATE 15 UG/2ML
15 SOLUTION RESPIRATORY (INHALATION)
Status: DISCONTINUED | OUTPATIENT
Start: 2020-03-23 | End: 2020-03-25 | Stop reason: HOSPADM

## 2020-03-23 RX ORDER — ASPIRIN 81 MG/1
81 TABLET ORAL DAILY
Status: DISCONTINUED | OUTPATIENT
Start: 2020-03-24 | End: 2020-03-25 | Stop reason: HOSPADM

## 2020-03-23 RX ADMIN — MONTELUKAST 10 MG: 10 TABLET, FILM COATED ORAL at 21:24

## 2020-03-23 RX ADMIN — LISINOPRIL 20 MG: 20 TABLET ORAL at 21:24

## 2020-03-23 RX ADMIN — DEXTROSE MONOHYDRATE, SODIUM CHLORIDE, AND POTASSIUM CHLORIDE 125 ML/HR: 50; 4.5; 1.49 INJECTION, SOLUTION INTRAVENOUS at 21:46

## 2020-03-23 RX ADMIN — IPRATROPIUM BROMIDE AND ALBUTEROL SULFATE 3 ML: .5; 3 SOLUTION RESPIRATORY (INHALATION) at 16:35

## 2020-03-23 RX ADMIN — HEPARIN SODIUM 5000 UNITS: 5000 INJECTION INTRAVENOUS; SUBCUTANEOUS at 21:39

## 2020-03-23 NOTE — ED PROVIDER NOTES
[facility]  [facility] EMERGENCY DEPT      4:11 PM    Date: 3/23/2020  Patient Name: Chris Burroughs    History of Presenting Illness     Chief Complaint   Patient presents with    COPD       60 y.o. female with noted past medical history who presents to the emergency department with worsening shortness of breath. The patient states that she has no history of COPD and has been compliant with her albuterol nebulizer machine as well as her trilogy CPAP/BiPAP. She states that she has gotten worsening shortness breath the last 3 days to the point where she is using albuterol machine every 2 hours without relief. On initial MD exam the patient is in moderate to severe respiratory distress with minimal air movement on pulmonary exam.  She denies any associated symptoms other than her typical COPD shortness of breath and wheezing. Specifically denies any fever chills or chest pain. Patient denies any travel to areas that are Level 3 as noted by the [facility] for Covid 19 risk. The patient denies recent travel outside [facility] and denies recent travel to areas large social gatherings. The patient denies any known history of Covid 19 exposure. Patient denies any other associated signs or symptoms. Patient denies any other complaints. Nursing notes regarding the HPI and triage nursing notes were reviewed. Prior medical records were reviewed. Current Outpatient Medications   Medication Sig Dispense Refill    predniSONE (DELTASONE) 20 mg tablet Take 60mg(3tabs) for 3 days, Take 40mg(2tabs) for 3 days, Take 20mg(1 tab) for 3 days, and then 10mg for 1 day. Then resume 10mg every other day. 20 Tab 0    amoxicillin-clavulanate (AUGMENTIN) 875-125 mg per tablet Take 1 Tab by mouth every twelve (12) hours. 2 Tab 0    lactobacillus sp. 50 billion cpu (BIO-K PLUS) 50 billion cell -375 mg cap capsule Take 1 Cap by mouth daily.  30 Cap 0    predniSONE (DELTASONE) 10 mg tablet Take 10 mg by mouth every other day.  PROLENSA 0.07 % ophthalmic solution Administer 1 Drop to right eye daily.  simethicone (GAS-X) 125 mg capsule Take 125 mg by mouth four (4) times daily as needed for Flatulence.  loratadine (CLARITIN) 10 mg tablet Take 10 mg by mouth daily as needed for Allergies.  lisinopril (PRINIVIL, ZESTRIL) 20 mg tablet Take 20 mg by mouth two (2) times a day.  albuterol sulfate (PROVENTIL;VENTOLIN) 2.5 mg/0.5 mL nebu nebulizer solution 0.5 mL by Nebulization route four (4) times daily as needed for Wheezing. To be used with HyperSal nebulizer solution. ICD code: COPD J44.1 60 mL 3    fluticasone propionate (FLONASE ALLERGY RELIEF) 50 mcg/actuation nasal spray 2 Sprays by Both Nostrils route daily.  fluticasone propion-salmeterol (ADVAIR HFA) 230-21 mcg/actuation inhaler Take 2 Puffs by inhalation two (2) times a day.  hydroCHLOROthiazide (HYDRODIURIL) 12.5 mg tablet Take 12.5 mg by mouth daily.  tiotropium bromide (SPIRIVA RESPIMAT) 2.5 mcg/actuation inhaler Take 2 Puffs by inhalation daily.  insulin glargine (LANTUS,BASAGLAR) 100 unit/mL (3 mL) inpn 35 Units by SubCUTAneous route nightly. 28 Units 0    albuterol sulfate 90 mcg/actuation aepb Take 1 Puff by inhalation every four (4) hours. (Patient taking differently: Take 1 Puff by inhalation every four (4) hours as needed.) 1 Inhaler 0    NOVOLOG FLEXPEN U-100 INSULIN 100 unit/mL inpn Continue home Sliding scale insulin as prior to admission (Patient taking differently: 1 Units by SubCUTAneous route three (3) times daily. If BG <100=0u  101-150=5u  151-250=8u  251-300=12u  >300 call MD  ) 1 Pen 0    omeprazole (PRILOSEC) 40 mg capsule Take 40 mg by mouth daily. Indications: gastroesophageal reflux disease      OXYGEN-AIR DELIVERY SYSTEMS 2 L by Nasal route continuous. [facility]      aspirin delayed-release 81 mg tablet Take 81 mg by mouth daily.       famotidine (PEPCID) 20 mg tablet Take 20 mg by mouth two (2) times daily as needed for Other (reflux).  montelukast (SINGULAIR) 10 mg tablet Take 10 mg by mouth nightly. Past History     Past Medical History:  Past Medical History:   Diagnosis Date    Asthma     Chronic lung disease     COPD     Cystocele, midline     Diabetes mellitus ([address]     GERD (gastroesophageal reflux disease)     Hidradenitis suppurativa     Hyperlipidemia     Hypertension     SHERRIE on CPAP     CPAP    Stress incontinence        Past Surgical History:  Past Surgical History:   Procedure Laterality Date    BREAST SURGERY PROCEDURE UNLISTED      Right breast benign tumor removal    HX APPENDECTOMY      HX CHOLECYSTECTOMY      HX DILATION AND CURETTAGE      Dysfunctional uterine bleeding, thought 2/2 fibroids    HX TUBAL LIGATION         Family History:  Family History   Problem Relation Age of Onset    Hypertension Mother     Stroke Mother     Breast Cancer Mother         Bilateral mastectomies    Cancer Mother         ovarian and breast    Heart Failure Mother     Heart Attack Father         2011    Heart Surgery Father         CABG    Heart Failure Father     COPD Sister         Heavy smoker    Cancer Sister         ovarian    Heart Failure Sister     Lung Disease Sister     Asthma Child     Cancer Maternal Aunt         pancreatic    Cancer Maternal Grandfather         stomach       Social History:  Social History     Tobacco Use    Smoking status: Former Smoker     Packs/day: 1.00     Years: 30.00     Pack years: 30.00     Types: Cigarettes     Start date: 1966     Last attempt to quit: 2006     Years since quittin.5    Smokeless tobacco: Never Used    Tobacco comment: 1-1.5 packs per day   Substance Use Topics    Alcohol use: No    Drug use: No       Allergies:   Allergies   Allergen Reactions    Ancef [Cefazolin] Hives    Contrast Agent [Iodine] Anaphylaxis, Shortness of Breath and Swelling     Needs pre-medication for IV contrast with Benadryl, Solu-Medrol    Fish Containing Products Anaphylaxis     Pt states she had a severe allergic reaction at 7 y/o.  Statins-Hmg-Coa Reductase Inhibitors Myalgia    Metformin Other (comments)     Abdominal pain, diarrhea.  Codeine Other (comments)     Altered mental status       Patient's primary care provider (as noted in EPIC): Ryan Rizo MD    Review of Systems    Visit Vitals  /83 (BP 1 Location: Left arm, BP Patient Position: At rest)   Pulse 81   Temp 97.5 °F (36.4 °C)   Resp 22   Ht 5' 3\" (1.6 m)   Wt 115.7 kg (255 lb)   SpO2 99%   BMI 45.17 kg/m²       PHYSICAL EXAM:    CONSTITUTIONAL:  Alert, in no apparent distress;  well developed;  well nourished. HEAD:  Normocephalic, atraumatic. EYES:  EOMI. Non-icteric sclera. Normal conjunctiva. ENTM:  Nose:  no rhinorrhea. Throat:  no erythema or exudate, mucous membranes moist.  NECK:  No JVD. Supple    RESPIRATORY:  Diffuse whole expiratory wheeze with significantly diminished air movement. CARDIOVASCULAR:  Regular rate and rhythm. No murmurs, rubs, or gallops. GI:  Normal bowel sounds, abdomen soft and non-tender. No rebound or guarding. BACK:  Non-tender. UPPER EXT:  Normal inspection. LOWER EXT:  No edema, no calf tenderness. Distal pulses intact. NEURO:  Moves all four extremities, and grossly normal motor exam.  SKIN:  No rashes;  Normal for age. PSYCH:  Alert and normal affect. DIFFERENTIAL DIAGNOSES/ MEDICAL DECISION MAKING:   Shortness of breath etiologies include chronic obstructive pulmonary disease (COPD), acute asthma exacerbation, congestive heart failure, pneumonia, acute bronchitis, pulmonary embolism, upper respiratory infection, cardiac event to include acute coronary syndrome, acute myocardial infarction or a combination of the above (ex URI on top of COPD thus causing respiratory distress).       Diagnostic Study Results     Abnormal lab results from this emergency department encounter:  Labs Reviewed   CBC WITH AUTOMATED DIFF - Abnormal; Notable for the following components:       Result Value    MPV 9.0 (*)     All other components within normal limits   METABOLIC PANEL, COMPREHENSIVE - Abnormal; Notable for the following components:    Glucose 320 (*)     BUN/Creatinine ratio 10 (*)     ALT (SGPT) 67 (*)     All other components within normal limits   MAGNESIUM   TROPONIN I   NT-PRO BNP   BLOOD GAS, ARTERIAL       Lab values for this patient within approximately the last 12 hours:  Recent Results (from the past 12 hour(s))   CBC WITH AUTOMATED DIFF    Collection Time: 03/23/20  3:13 PM   Result Value Ref Range    WBC 5.4 4.6 - 13.2 K/uL    RBC 4.40 4.20 - 5.30 M/uL    HGB 13.1 12.0 - 16.0 g/dL    HCT 38.0 35.0 - 45.0 %    MCV 86.4 74.0 - 97.0 FL    MCH 29.8 24.0 - 34.0 PG    MCHC 34.5 31.0 - 37.0 g/dL    RDW 13.6 11.6 - 14.5 %    PLATELET 447 497 - 286 K/uL    MPV 9.0 (L) 9.2 - 11.8 FL    NEUTROPHILS 61 40 - 73 %    LYMPHOCYTES 28 21 - 52 %    MONOCYTES 8 3 - 10 %    EOSINOPHILS 3 0 - 5 %    BASOPHILS 0 0 - 2 %    ABS. NEUTROPHILS 3.3 1.8 - 8.0 K/UL    ABS. LYMPHOCYTES 1.5 0.9 - 3.6 K/UL    ABS. MONOCYTES 0.4 0.05 - 1.2 K/UL    ABS. EOSINOPHILS 0.2 0.0 - 0.4 K/UL    ABS. BASOPHILS 0.0 0.0 - 0.1 K/UL    DF AUTOMATED     METABOLIC PANEL, COMPREHENSIVE    Collection Time: 03/23/20  3:13 PM   Result Value Ref Range    Sodium 136 136 - 145 mmol/L    Potassium 4.0 3.5 - 5.5 mmol/L    Chloride 103 100 - 111 mmol/L    CO2 30 21 - 32 mmol/L    Anion gap 3 3.0 - 18 mmol/L    Glucose 320 (H) 74 - 99 mg/dL    BUN 9 7.0 - 18 MG/DL    Creatinine 0.90 0.6 - 1.3 MG/DL    BUN/Creatinine ratio 10 (L) 12 - 20      GFR est AA >60 >60 ml/min/1.73m2    GFR est non-AA >60 >60 ml/min/1.73m2    Calcium 8.9 8.5 - 10.1 MG/DL    Bilirubin, total 0.5 0.2 - 1.0 MG/DL    ALT (SGPT) 67 (H) 13 - 56 U/L    AST (SGOT) 30 10 - 38 U/L    Alk.  phosphatase 83 45 - 117 U/L    Protein, total 7.3 6.4 - 8.2 g/dL    Albumin 3.4 3.4 - 5.0 g/dL    Globulin 3.9 2.0 - 4.0 g/dL    A-G Ratio 0.9 0.8 - 1.7     MAGNESIUM    Collection Time: 03/23/20  3:13 PM   Result Value Ref Range    Magnesium 1.6 1.6 - 2.6 mg/dL   TROPONIN I    Collection Time: 03/23/20  3:13 PM   Result Value Ref Range    Troponin-I, QT <0.02 0.0 - 0.045 NG/ML   NT-PRO BNP    Collection Time: 03/23/20  3:13 PM   Result Value Ref Range    NT pro-BNP 35 0 - 900 PG/ML       Radiologist and cardiologist interpretations if available at time of this note:  Xr Chest Port    Result Date: 3/23/2020  EXAM:  Chest Portable. INDICATION:  Shortness of breath, COPD COMPARISON:  03/13/20 TECHNIQUE:  Portable AP chest study FINDINGS:  - Both lungs are clear. - No pleural effusion or pneumothorax is detected. - Cardiac silhouette, mediastinum and hilar regions appear unremarkable. - No significant interval changes are noted. Candance Huger IMPRESSION: No acute abnormalities. Medication(s) ordered for patient during this emergency visit encounter:  Medications   albuterol-ipratropium (DUO-NEB) 2.5 MG-0.5 MG/3 ML (3 mL Nebulization Given 3/23/20 1635)       Medical Decision Making     I am the first provider for this patient. I reviewed the vital signs, available nursing notes, past medical history, past surgical history, family history and social history. Vital Signs:  Reviewed the patient's vital signs. ED COURSE:    Critical Care Note:  Respiratory Distress    Critical care minutes: 85 MINUTES. Given patient's initial arrival in respiratory distress, numerous serial reevaluations of the patient's respiratory status and patient's response to various medical interventions.           Given the patients underlying condition medical intervention(s) were needed, requiring numerous reevaluations of patient's vital signs and response to different emergency department therapies, total bedside time evaluating and/or treating the patient, not including procedures, is noted below.    IMPRESSION AND MEDICAL DECISION MAKING:  Based upon the patient's presentation with noted HPI and PE, along with the work up done in the emergency department, I believe that the patient is having an acute COPD exacerbation. Admit to Physician    The patient was presented to the accepting physician, Dr. Karo Hodgson, [address] [place] [address] physician. I subsequently spoke to the [facility] resident who will evaluate the patient and admit with consultation with the noted accepting physician. As the emergency physician, I wrote courtesy admission orders for the admitting physician. The courtesy orders included explicit instructions for the floor nursing staff to call the admitting attending physician upon patient arrival on the floor. Coding Diagnoses     Clinical Impression:   1. Respiratory distress    2. Acute exacerbation of chronic obstructive pulmonary disease (COPD) ([facility]        Disposition     Disposition:  Admit. Dena Astudillo M.D. JESSY Board Certified Emergency Physician    Provider Attestation:  If a scribe was utilized in generation of this patient record, I personally performed the services described in the documentation, reviewed the documentation, as recorded by the scribe in my presence, and it accurately records the patient's history of presenting illness, review of systems, patient physical examination, and procedures performed by me as the attending physician. SUPRIYA Btaista Board Certified Emergency Physician  3/23/2020.

## 2020-03-24 LAB
ANION GAP SERPL CALC-SCNC: 5 MMOL/L (ref 3–18)
BASOPHILS # BLD: 0 K/UL (ref 0–0.1)
BASOPHILS NFR BLD: 0 % (ref 0–2)
BUN SERPL-MCNC: 8 MG/DL (ref 7–18)
BUN/CREAT SERPL: 11 (ref 12–20)
CALCIUM SERPL-MCNC: 8.5 MG/DL (ref 8.5–10.1)
CHLORIDE SERPL-SCNC: 105 MMOL/L (ref 100–111)
CO2 SERPL-SCNC: 30 MMOL/L (ref 21–32)
CREAT SERPL-MCNC: 0.74 MG/DL (ref 0.6–1.3)
DIFFERENTIAL METHOD BLD: NORMAL
EOSINOPHIL # BLD: 0.2 K/UL (ref 0–0.4)
EOSINOPHIL NFR BLD: 3 % (ref 0–5)
ERYTHROCYTE [DISTWIDTH] IN BLOOD BY AUTOMATED COUNT: 13.9 % (ref 11.6–14.5)
GLUCOSE BLD STRIP.AUTO-MCNC: 143 MG/DL (ref 70–110)
GLUCOSE BLD STRIP.AUTO-MCNC: 189 MG/DL (ref 70–110)
GLUCOSE BLD STRIP.AUTO-MCNC: 191 MG/DL (ref 70–110)
GLUCOSE BLD STRIP.AUTO-MCNC: 245 MG/DL (ref 70–110)
GLUCOSE SERPL-MCNC: 173 MG/DL (ref 74–99)
HCT VFR BLD AUTO: 37.2 % (ref 35–45)
HGB BLD-MCNC: 12.6 G/DL (ref 12–16)
LYMPHOCYTES # BLD: 2.1 K/UL (ref 0.9–3.6)
LYMPHOCYTES NFR BLD: 39 % (ref 21–52)
MCH RBC QN AUTO: 29.9 PG (ref 24–34)
MCHC RBC AUTO-ENTMCNC: 33.9 G/DL (ref 31–37)
MCV RBC AUTO: 88.4 FL (ref 74–97)
MONOCYTES # BLD: 0.5 K/UL (ref 0.05–1.2)
MONOCYTES NFR BLD: 10 % (ref 3–10)
NEUTS SEG # BLD: 2.6 K/UL (ref 1.8–8)
NEUTS SEG NFR BLD: 48 % (ref 40–73)
PLATELET # BLD AUTO: 215 K/UL (ref 135–420)
PMV BLD AUTO: 9.3 FL (ref 9.2–11.8)
POTASSIUM SERPL-SCNC: 3.8 MMOL/L (ref 3.5–5.5)
RBC # BLD AUTO: 4.21 M/UL (ref 4.2–5.3)
SODIUM SERPL-SCNC: 140 MMOL/L (ref 136–145)
WBC # BLD AUTO: 5.4 K/UL (ref 4.6–13.2)

## 2020-03-24 PROCEDURE — 94760 N-INVAS EAR/PLS OXIMETRY 1: CPT

## 2020-03-24 PROCEDURE — 36415 COLL VENOUS BLD VENIPUNCTURE: CPT

## 2020-03-24 PROCEDURE — 65270000029 HC RM PRIVATE

## 2020-03-24 PROCEDURE — 82962 GLUCOSE BLOOD TEST: CPT

## 2020-03-24 PROCEDURE — 97535 SELF CARE MNGMENT TRAINING: CPT

## 2020-03-24 PROCEDURE — 74011250637 HC RX REV CODE- 250/637: Performed by: STUDENT IN AN ORGANIZED HEALTH CARE EDUCATION/TRAINING PROGRAM

## 2020-03-24 PROCEDURE — 97165 OT EVAL LOW COMPLEX 30 MIN: CPT

## 2020-03-24 PROCEDURE — 74011636637 HC RX REV CODE- 636/637: Performed by: STUDENT IN AN ORGANIZED HEALTH CARE EDUCATION/TRAINING PROGRAM

## 2020-03-24 PROCEDURE — 85025 COMPLETE CBC W/AUTO DIFF WBC: CPT

## 2020-03-24 PROCEDURE — 97530 THERAPEUTIC ACTIVITIES: CPT

## 2020-03-24 PROCEDURE — 80048 BASIC METABOLIC PNL TOTAL CA: CPT

## 2020-03-24 PROCEDURE — 97162 PT EVAL MOD COMPLEX 30 MIN: CPT

## 2020-03-24 PROCEDURE — 74011000250 HC RX REV CODE- 250: Performed by: STUDENT IN AN ORGANIZED HEALTH CARE EDUCATION/TRAINING PROGRAM

## 2020-03-24 PROCEDURE — 94640 AIRWAY INHALATION TREATMENT: CPT

## 2020-03-24 PROCEDURE — 74011250636 HC RX REV CODE- 250/636: Performed by: STUDENT IN AN ORGANIZED HEALTH CARE EDUCATION/TRAINING PROGRAM

## 2020-03-24 RX ORDER — DEXTROSE MONOHYDRATE 100 MG/ML
125-250 INJECTION, SOLUTION INTRAVENOUS AS NEEDED
Status: DISCONTINUED | OUTPATIENT
Start: 2020-03-24 | End: 2020-03-25 | Stop reason: HOSPADM

## 2020-03-24 RX ORDER — BUDESONIDE 0.25 MG/2ML
250 INHALANT ORAL
Status: DISCONTINUED | OUTPATIENT
Start: 2020-03-24 | End: 2020-03-25 | Stop reason: HOSPADM

## 2020-03-24 RX ORDER — DEXTROSE, SODIUM CHLORIDE, AND POTASSIUM CHLORIDE 5; .45; .15 G/100ML; G/100ML; G/100ML
125 INJECTION INTRAVENOUS CONTINUOUS
Status: DISCONTINUED | OUTPATIENT
Start: 2020-03-24 | End: 2020-03-24

## 2020-03-24 RX ORDER — SIMETHICONE 80 MG
125 TABLET,CHEWABLE ORAL
Status: DISCONTINUED | OUTPATIENT
Start: 2020-03-24 | End: 2020-03-25 | Stop reason: HOSPADM

## 2020-03-24 RX ADMIN — INSULIN LISPRO 2 UNITS: 100 INJECTION, SOLUTION INTRAVENOUS; SUBCUTANEOUS at 08:38

## 2020-03-24 RX ADMIN — HEPARIN SODIUM 5000 UNITS: 5000 INJECTION INTRAVENOUS; SUBCUTANEOUS at 13:01

## 2020-03-24 RX ADMIN — LISINOPRIL 20 MG: 20 TABLET ORAL at 10:29

## 2020-03-24 RX ADMIN — HEPARIN SODIUM 5000 UNITS: 5000 INJECTION INTRAVENOUS; SUBCUTANEOUS at 05:59

## 2020-03-24 RX ADMIN — INSULIN LISPRO 2 UNITS: 100 INJECTION, SOLUTION INTRAVENOUS; SUBCUTANEOUS at 14:22

## 2020-03-24 RX ADMIN — HYDROCHLOROTHIAZIDE 12.5 MG: 25 TABLET ORAL at 10:29

## 2020-03-24 RX ADMIN — DEXTROSE MONOHYDRATE, SODIUM CHLORIDE, AND POTASSIUM CHLORIDE 125 ML/HR: 50; 4.5; 1.49 INJECTION, SOLUTION INTRAVENOUS at 10:30

## 2020-03-24 RX ADMIN — ASPIRIN 81 MG: 81 TABLET, COATED ORAL at 10:29

## 2020-03-24 RX ADMIN — ARFORMOTEROL TARTRATE 15 MCG: 15 SOLUTION RESPIRATORY (INHALATION) at 08:17

## 2020-03-24 RX ADMIN — METHYLPREDNISOLONE 60 MG: 4 TABLET ORAL at 22:27

## 2020-03-24 RX ADMIN — INSULIN GLARGINE 15 UNITS: 100 INJECTION, SOLUTION SUBCUTANEOUS at 22:32

## 2020-03-24 RX ADMIN — FLUTICASONE PROPIONATE 2 SPRAY: 50 SPRAY, METERED NASAL at 08:38

## 2020-03-24 RX ADMIN — PANTOPRAZOLE SODIUM 40 MG: 40 TABLET, DELAYED RELEASE ORAL at 10:29

## 2020-03-24 RX ADMIN — HEPARIN SODIUM 5000 UNITS: 5000 INJECTION INTRAVENOUS; SUBCUTANEOUS at 22:34

## 2020-03-24 RX ADMIN — BUDESONIDE 250 MCG: 0.25 SUSPENSION RESPIRATORY (INHALATION) at 19:48

## 2020-03-24 RX ADMIN — ARFORMOTEROL TARTRATE 15 MCG: 15 SOLUTION RESPIRATORY (INHALATION) at 19:48

## 2020-03-24 RX ADMIN — BUDESONIDE 250 MCG: 0.25 SUSPENSION RESPIRATORY (INHALATION) at 08:17

## 2020-03-24 RX ADMIN — IPRATROPIUM BROMIDE 0.5 MG: 0.5 SOLUTION RESPIRATORY (INHALATION) at 08:17

## 2020-03-24 RX ADMIN — IPRATROPIUM BROMIDE 0.5 MG: 0.5 SOLUTION RESPIRATORY (INHALATION) at 23:02

## 2020-03-24 RX ADMIN — MONTELUKAST 10 MG: 10 TABLET, FILM COATED ORAL at 22:29

## 2020-03-24 NOTE — PROGRESS NOTES
Reason for Admission:   Acute exacerbation of chronic obstructive pulmonary disease (COPD) ([facility] [J44.1]  COPD exacerbation ([facility] [J44.1]               RRAT Score:     33%             Resources/supports as identified by patient/family:    Lives [address]John [address] facing patient (as identified by patient/family and CM):     SOB           Current Advanced Directive/Advance Care Plan:   no                          Plan for utilizing home health:    likely                      Likelihood of readmission:   HIGH    Transition of Care Plan:                    Initial assessment completed with patient. Cognitive status of patient: oriented to time, place, person and situation. Face sheet information confirmed:  yes. The patient designates jurgenr Natalie Pallas, to participate in her discharge plan and to receive any needed information. This patient lives in a single family home with daughter Natalie Pallas. Patient is not able to navigate steps as needed. Prior to hospitalization, patient was considered to be independent with ADLs/IADLS : no . If not independent,  patient needs assist with : has aid to assist with ADLS and IADLS    Patient has a current ACP document on file: no  The daughter will be available to transport patient home upon discharge. The patient already has nebulizer and oxygen through [facility] and [facility] through [address] equipment available in the home. Has aid 5 days a week through [facility]    Patient is not currently active with home health. Patient has not stayed in a skilled nursing facility or rehab. This patient is on dialysis :no    Currently, the discharge plan is Home with Home Health. The patient states that she can obtain her medications from the pharmacy, and take her medications as directed. Patient's current insurance is Medicare and Medicaid. Care Management Interventions  PCP Verified by CM:  Yes  Palliative Care Criteria Met (RRAT>21 & CHF Dx)?: No  Mode of Transport at Discharge: Self  Transition of Care Consult (CM Consult): Home Health, Discharge Planning  MyChart Signup: No  Discharge Durable Medical Equipment: No  Physical Therapy Consult: Yes  Occupational Therapy Consult: Yes  Current Support Network: Relative's Home  Confirm Follow Up Transport: Family  Discharge Location  Discharge Placement: Home with home health        JAMES Noonan  Case Management  [phone]

## 2020-03-24 NOTE — PROGRESS NOTES
[address]  RESPONSE TO [facility] INQUIRY      Patient: Marlys Brennan MRN: [id number]  [facility]: [id number]    YOB: 1960  Age: 60 y.o. Sex: female         INQUIRY   Patient noted to have a \"hx of asthma/COPD on 2L home 02 and sleeps with trellegy\". If possible, please document in progress notes and discharge summary further specificity regarding asthma as follows:                Asthma with or without obstructive component             Asthma exacerbation or status asthmaticus             Asthma severity as mild intermittent, mild, moderate or severe persistent (see below*)             Other, please specify             Clinically unable to determine     The medical record reflects the following:       Risk Factors: Has hx of asthma/COPD on 2L home 02 and sleeps with trellegy; steroid dependent       Clinical Indicators: SOB       Treatment: BIPAP; ABG PRN; methylprednisolone 60 mg daily; Duonebs q4h      *Mild intermittent- symptoms < 2 x/week &/or nocturnal awakenings < 2x/month. Use of inhalers = 2 x/week. No limits to normal activity. *Mild persistent - symptoms > 2x/week but not daily &/or nocturnal awakenings 3-4 x/month. Use of inhalers > 2x/week but not daily. Minor impact on normal activity. *Moderate persistent - daily symptoms and/or nocturnal awakenings > 1x/week but not nightly. Daily use of inhalers. Some impact on normal activity. *Severe persistent - symptoms throughout the day and/or nocturnal awakenings up to 7x/week. Use of inhalers several times per day. Normal activity is extremely limited.      Patient admitted with BMI 43.56.  If possible, please document in progress notes and discharge summary if you are evaluating and /or treating any of the following:                Obesity              Morbid obesity              Overweight             Other, please specify             BMI not clinically significant      The medical record reflects the following:    Risk Factors: DM; SHERRIE; COPD    Clinical Indicators: BMI 43.56    Treatment: npo, diabetic diet when off bipap     REFERENCE:  The [facility] has issued a statement indicating that, \"Individuals who are obese or morbidly obese are at an increased risk for certain medical conditions when compared to persons of normal weight. Therefore, these conditions are always clinically significant and reportable when documented by the provider. \"     Morbidly Obese:  BMI >/=40 or BMI >35 with obesity-related health condition         (e.g. Type 2 DM, HTN, SHERRIE, GERD, depression, OA weight bearing joints, urinary                 stress incontinence, etc.)   Obese/Obesity:  BMI 30 - 39.9  Overweight:  BMI 25 - 29.9    RESPONSE   Pt with at least moderate persistent asthma with current exascerbation    Evaluating morbid obesity that may contribute to her respiratory symptoms and almost certainly contributes to her diabetes, HTN, SHERRIE and GERD    Josh Wolf MD , PGY-1   [facility] Medicine   Senior Pager: [phone]   March 24, 2020, 7:11 PM

## 2020-03-24 NOTE — CDMP QUERY
Patient noted to have a \"hx of asthma/COPD on 2L home 02 and sleeps with trellegy\". If possible, please document in progress notes and discharge summary further specificity regarding asthma as follows:  Asthma with or without obstructive component  Asthma exacerbation or status asthmaticus  Asthma severity as mild intermittent, mild, moderate or severe persistent (see below*)  Other, please specify  Clinically unable to determine The medical record reflects the following: 
     Risk Factors: Has hx of asthma/COPD on 2L home 02 and sleeps with trellegy; steroid dependent Clinical Indicators: SOB Treatment: BIPAP; ABG PRN; methylprednisolone 60 mg daily; Duonebs q4h *Mild intermittent- symptoms < 2 x/week &/or nocturnal awakenings < 2x/month. Use of inhalers = 2 x/week. No limits to normal activity. *Mild persistent - symptoms > 2x/week but not daily &/or nocturnal awakenings 3-4 x/month. Use of inhalers > 2x/week but not daily. Minor impact on normal activity. *Moderate persistent - daily symptoms and/or nocturnal awakenings > 1x/week but not nightly. Daily use of inhalers. Some impact on normal activity. *Severe persistent - symptoms throughout the day and/or nocturnal awakenings up to 7x/week. Use of inhalers several times per day. Normal activity is extremely limited. Thank you, Minoo Cason RN/[email or web address] [email or web address]

## 2020-03-24 NOTE — DIABETES MGMT
NUTRITIONAL ASSESSMENT GLYCEMIC CONTROL/ PLAN OF CARE     Amalia Moralez           59 y.o.           3/23/2020                 1. Respiratory distress    2. Acute exacerbation of chronic obstructive pulmonary disease (COPD) (HCC)       INTERVENTIONS/PLAN:   Continue inpatient monitoring and intervention     ASSESSMENT:   Pt is a 60year old female with a past medical history significant for COPD, type 2 diabetes, hypertension, SHERRIE, and GERD who presented with SOB. Pt recently ended steroids taper at home. Pt has reported difficulty managing diabetes while taking steroids. Pt is currently NPO and has order for D5 1/2 NS at 125 mL/hr. Pt has order for methylprednisolone 60 mg daily. Blood glucose slightly elevated. Pt has received correctional Lispro insulin coverage, noted order for basal insulin.      Diabetes Management:   Recent blood glucose:     3/23/2020 21:23 3/24/2020 08:01 3/24/2020 10:58   146 (H) 191 (H) 189 (H)   Within target range (non-ICU: <140; ICU<180): [] Yes   [x]  No    Current Insulin regimen:   Lantus insulin 15 units daily   Correctional Lispro insulin 4 times daily ACHS  Home medication/insulin regimen:   Lantus insulin 35 units nightly   Novolog insulin per sliding scale   HbA1c: 8.2% (estimated average glucose of 189 mg/dL)  Adequate glycemic control PTA:  [] Yes  [x] No     SUBJECTIVE/OBJECTIVE:   Diet: NPO    Medications: [x] Reviewed     Most Recent POC Glucose:   Recent Labs     03/24/20  0520 03/23/20  1513   * 320*      Labs:   Lab Results   Component Value Date/Time    Hemoglobin A1c 8.2 (H) 03/05/2020 05:41 AM     Lab Results   Component Value Date/Time    Sodium 140 03/24/2020 05:20 AM    Potassium 3.8 03/24/2020 05:20 AM    Chloride 105 03/24/2020 05:20 AM    CO2 30 03/24/2020 05:20 AM    Anion gap 5 03/24/2020 05:20 AM    Glucose 173 (H) 03/24/2020 05:20 AM    BUN 8 03/24/2020 05:20 AM    Creatinine 0.74 03/24/2020 05:20 AM    Calcium 8.5 03/24/2020 05:20 AM Magnesium 1.6 03/23/2020 03:13 PM    Phosphorus 3.1 01/19/2019 06:51 AM    Albumin 3.4 03/23/2020 03:13 PM     Anthropometrics:  BMI (calculated): 43.6  Wt Readings from Last 1 Encounters:   03/24/20 111.5 kg (245 lb 14.4 oz)      Ht Readings from Last 1 Encounters:   03/23/20 5' 3\" (1.6 m)     Estimated Nutrition Needs:  0792-1802 Kcal/day, 54-74 grams protein/day   Based on:   []  Actual BW    []  IBW   [x]  Adjusted BW   (67 kg)    Nutrition Diagnoses:    Altered nutrition related lab value related to diabetes as evidenced by Hemoglobin A1c of 8.2%   Nutrition Interventions: Coordination of care   Goal: Blood glucose will be within target range of  mg/dL by 3/27/20     Nutrition Monitoring and Evaluation    []     Monitor po intake on meal rounds  [x]     Continue inpatient monitoring and intervention  []     Other:    Rosa Crow RD, CDE

## 2020-03-24 NOTE — ED NOTES
Patient's oxygen saturation dropped to 85% while off BiPAP to take PO medication.  Patient placed back on BiPAP

## 2020-03-24 NOTE — DIABETES MGMT
Diabetes Patient/Family Education Record  Factors That  May Influence Patients Ability  to Learn or  Comply with Recommendations   []   Language barrier    []   Cultural needs   []   Motivation    []   Cognitive limitation    []   Physical   [x]   Education    []   Physiological factors   []   Hearing/vision/speaking impairment   []   Congregation beliefs    []   Financial factors   []  Other:   []  No factors identified at this time. Person Instructed:   [x]   Patient   []   Family   []  Other     Preference for Learning:   []   Verbal   []   Written   []  Demonstration     Level of Comprehension & Competence:    [x]  Good                                      [] Fair                                     []  Poor                             []  Needs Reinforcement   [x]  Teachback completed    Education Component: Patient immediately recognized me from prior encounter and stated, \"my sugar is running high because I am have been on prednisone. [x]  Medication management, including how to administer insulin (if appropriate) and potential medication interactions: Yes. Patient reported list of home diabetes medications:  Basaglar (lantus) pen insulin 35 units daily at bedtime. Novolog pen sliding scale insulin TID AC. Patient reported not skipping diabetes meds but her blood sugar is still high due to effect of steroids. [x]  Nutritional management -obtain usual meal pattern: Yes. Patient does not follow specific meal plan but pays attention to serving size/portion control of carbs (starches, fruits, dairy). []  Exercise   [x]  Signs, symptoms, and treatment of hyperglycemia and hypoglycemia: Yes. Patient verbalized understanding that it is impt to cont to follow her diabetes treatment plan to manage her diabetes and keep her A1c from going up. But patient feel a bit frustrated due effect of chronic steroids for her breathing problem.       [x] Prevention, recognition and treatment of hyperglycemia and hypoglycemia: Yes. Patient verbalized understanding of hypoglycemia: symptoms, treatment, cause, prevention. [x]  Importance of blood glucose monitoring and how to obtain a blood glucose meter: Yes.    []  Instruction on use of the blood glucose meter   [x]  Discuss the importance of HbA1C monitoring: Yes. Discussed that her current A1c level is 8.2% (3/05/2020) which is equivalent to estimated average blood glucose of 189 mg/dL during the past 2-3 months. Her prior A1c level was 8.3% (10/15/2020). []  Sick day guidelines   [x]  Proper use and disposal of lancets, needles, syringes or insulin pens (if appropriate): Yes. [x]  Potential long-term complications (retinopathy, kidney disease, neuropathy, foot care): Yes. [x] Information about whom to contact in case of emergency or for more information: Yes. [x]  Goal:  Patient/family will demonstrate understanding of Diabetes Self Management Skills by: 3/31/2020  Plan for post-discharge education or self-management support:    [] Outpatient class schedule provided            [x] Patient Declined: patient is not interested at this time. [] Scheduled for outpatient classes (date) _______  Verify:  Does patient understand how diabetes medications work? Yes. Does patient know what their most recent A1c is? Yes. Does patient monitor glucose at home? Yes. Does patient have difficulty obtaining diabetes medications and testing supplies?  No.       John De Guzman RN [facility]  Pager: [phone]

## 2020-03-24 NOTE — PROGRESS NOTES
Problem: Self Care Deficits Care Plan (Adult)  Goal: *Acute Goals and Plan of Care (Insert Text)  Outcome: Resolved/Met   OCCUPATIONAL THERAPY EVALUATION/DISCHARGE    Patient: Gene Rivers (89 y.o. female)  Date: 3/24/2020  Primary Diagnosis: Acute exacerbation of chronic obstructive pulmonary disease (COPD) ([facility] [J44.1]  COPD exacerbation (H[facility]) [J44.1]        Precautions: Enteric Contact     PLOF: Pt reports she lives with her daughter and grandchildren in a [place]. Pt reports ADL performance varied on a daily basis but was (I) with basic self-care/ADLs PTA. Pt's bathtub/shower on the second floor, equipped with a shower chair. Pt has care aides to assist with IADLs. Pt uses 2L O2 at baseline. ASSESSMENT AND RECOMMENDATIONS:  Pt cleared to participate in OT evaluation by Rn. Upon entering room, pt supine with HOB elevated, alert, and agreeable to therapy session. Based on the objective data described below, the patient presents she is (I)/Mod(I) with basic self-care/ADLs in sitting, requiring supervision for standing activities (ie. toilet transfers). SpO2 remained >94% in all positions with pt on 3L O2 via NC. Will defer to PT for further functional balance and mobility. Pt reported recently difficulty transferring in/out of tub d/t high step over, therefore pt may benefit from using a tub transfer bench for easier transition. Educated pt on the role of Ot, evaluation process, self-pacing, pursed lip breathing, and energy conservation techniques with pt demo good understanding. Pt reports she has supportive family members and care aides to assist PRN. Pt does not require further skilled OT at this level of care, therefore OT to d/c pt from caseload. Discharge Recommendations: Home Health safety evaluation  Further Equipment Recommendations for Discharge: Tub transfer bench as pt reports she has been getting difficulty stepping over her tub.      SUBJECTIVE:   Patient stated steroids will fix it\" referring to her SOB. OBJECTIVE DATA SUMMARY:     Past Medical History:   Diagnosis Date    Asthma     Chronic lung disease     COPD     Cystocele, midline     Diabetes mellitus ([address]     GERD (gastroesophageal reflux disease)     Hidradenitis suppurativa     Hyperlipidemia     Hypertension     SHERRIE on CPAP     CPAP    Stress incontinence      Past Surgical History:   Procedure Laterality Date    BREAST SURGERY PROCEDURE UNLISTED      Right breast benign tumor removal    HX APPENDECTOMY      HX CHOLECYSTECTOMY      HX DILATION AND CURETTAGE      Dysfunctional uterine bleeding, thought 2/2 fibroids    HX TUBAL LIGATION       Barriers to Learning/Limitations: None  Compensate with: visual, verbal, tactile, kinesthetic cues/model    Home Situation:      []     Right hand dominant   []     Left hand dominant    Cognitive/Behavioral Status:  Neurologic State: Alert  Orientation Level: Oriented X4  Cognition: Appropriate decision making; Follows commands  Safety/Judgement: Fall prevention    Skin: Visible skin appeared intact  Edema: None noted      Coordination: BUE  Coordination: Within functional limits  Fine Motor Skills-Upper: Left Intact; Right Intact    Gross Motor Skills-Upper: Left Intact; Right Intact    Balance:  Sitting: Intact  Standing: Intact  Standing - Static: Good  Standing - Dynamic : Fair(+)    Strength: BUE  Strength: Generally decreased, functional    Tone & Sensation: BUE  Tone: Normal  Sensation: Intact    Range of Motion: BUE  AROM: Within functional limits         Functional Mobility and Transfers for ADLs:  Bed Mobility:  Supine to Sit: Supervision  Sit to Supine: Supervision  Scooting: Supervision  Transfers:  Sit to Stand: Supervision  Stand to Sit: Supervision      ADL Assessment:  Feeding: Independent    Oral Facial Hygiene/Grooming: Independent    Bathing: Modified independent    Upper Body Dressing: Modified independent    Lower Body Dressing: Modified independent    Toileting: Supervision      ADL Intervention:  Lower Body Dressing Assistance  Dressing Assistance: Modified independent  Socks: Modified independent  Position Performed: Seated edge of bed    Cognitive Retraining  Safety/Judgement: Fall prevention      Pain:  Pain level pre-treatment: 0/10   Pain level post-treatment: 0/10   Pain Intervention(s): Medication (see MAR); Rest, Ice, Repositioning  Response to intervention: Nurse notified, See doc flow    Activity Tolerance:   Good    Please refer to the flowsheet for vital signs taken during this treatment. After treatment:   []  Patient left in no apparent distress sitting up in chair  [x]  Patient left in no apparent distress in bed  [x]  Call bell left within reach  [x]  Nursing notified  []  Caregiver present  []  Bed alarm activated    COMMUNICATION/EDUCATION:   [x]      Role of Occupational Therapy in the acute care setting  [x]      Home safety education was provided and the patient/caregiver indicated understanding. [x]      Patient/family have participated as able and agree with findings and recommendations. []      Patient is unable to participate in plan of care at this time. Thank you for this referral.  Shay Archuleta MS, OTR/L  Time Calculation: 37 mins      Eval Complexity: History: MEDIUM Complexity : Expanded review of history including physical, cognitive and psychosocial  history ; Examination: LOW Complexity : 1-3 performance deficits relating to physical, cognitive , or psychosocial skils that result in activity limitations and / or participation restrictions ;    Decision Making:LOW Complexity : No comorbidities that affect functional and no verbal or physical assistance needed to complete eval tasks

## 2020-03-24 NOTE — CDMP QUERY
Patient admitted with BMI 43.56. If possible, please document in progress notes and discharge summary if you are evaluating and /or treating any of the following:  Obesity  Morbid obesity  Overweight  Other, please specify  BMI not clinically significant The medical record reflects the following: 
  Risk Factors: DM; SHERRIE; COPD Clinical Indicators: BMI 43.56 Treatment: npo, diabetic diet when off bipap REFERENCE: 
The [address] [place] [address] has issued a statement indicating that, \"Individuals who are obese or morbidly obese are at an increased risk for certain medical conditions when compared to persons of normal weight. Therefore, these conditions are always clinically significant and reportable when documented by the provider. \" Morbidly Obese:  BMI >/=40 or BMI >35 with obesity-related health condition  
      (e.g. Type 2 DM, HTN, SHERRIE, GERD, depression, OA weight bearing joints, urinary  
              stress incontinence, etc.) Obese/Obesity:  BMI 30 - 39.9 Overweight:  BMI 25 - 29.9 Thank you, Basia Zapata RN/EDOUARD [email or web address]

## 2020-03-24 NOTE — PROGRESS NOTES
0099: Bedside and Verbal shift change report given to Rudy Mcgregor RN (oncoming nurse) by Ajay Holland RN (offgoing nurse). Report included the following information SBAR, Kardex, Intake/Output, MAR and Cardiac Rhythm NSR.     1910: Bedside and Verbal shift change report given to NERI PRICE RN (oncoming nurse) by Chacha Alexander RN (offgoing nurse). Report included the following information SBAR, Kardex, Recent Results and Cardiac Rhythm NSR.

## 2020-03-24 NOTE — H&P
Intern Admission History and Physical  [facility]      Patient: Lenora Jacobs MRN: [id number]  CSN: [id number]    YOB: 1960  Age: 60 y.o. Sex: female      Admission Date: 3/23/2020       HPI:     Benjamin De Dios is a 60 y.o. female with PMH of COPD on home O2, IDDM2, HTN, HFpEF, SHERRIE on CPAP, GERD, allergic rhinitis, now presenting with complaint of SOB. Reporting ending steroid taper from last COPD admission about 4 days ago, symptoms worsening since then, SOB with walking room to room in house, desaturations as low as 91%. Says she will feel better after duonebs for a couple hours but then it goes right back. Reports adherence to all home meds. Chest pain described as around her chest and ribs a tightness that is normal for her CHF. Says she feels better than when she got to the ED but if she were to go home she is sure she would end up right back here tomorrow. Says the steroids did help her but she does not like being on them for her diabetes control and her mood when on them. Mild cough present no different from baseline.      ED Course (See objective for values/interpretations):  Labs obtained: CBC, BMP, Trops, BNP  Medications administered: Duoneb x 1  Imaging obtained: CXR    Review of Systems: [place] are positive  General ROS: negative for  - chills, fever, night sweats, weight gain and weight loss  Psychological ROS: negative for - anxiety and depression  Ophthalmic ROS: negative for - blurry vision, decreased vision or loss of vision  ENT ROS: negative for - headaches, hearing change or visual changes  Hematological and Lymphatic ROS: negative for - bruising, jaundice  Respiratory ROS: negative for - cough, hemoptysis, shortness of breath  Cardiovascular ROS: negative for - chest pain, dyspnea on exertion, edema, loss of consciousness, or palpitations   Gastrointestinal ROS: negative for - abdominal pain, blood in stools, change in stools, constipation, diarrhea, hematemesis, melena, nausea/vomiting or swallowing difficulty/pain  Genito-Urinary ROS: negative for - dysuria, hematuria or urinary frequency/urgency  Musculoskeletal ROS: negative for - joint pain, joint swelling or muscle pain  Neurological ROS: negative for - dizziness, headaches, numbness/tingling or weakness  Dermatological ROS: negative for - rash or skin lesion changes    Past Medical History:   Diagnosis Date    Asthma     Chronic lung disease     COPD     Cystocele, midline     Diabetes mellitus ([facility] [address]     GERD (gastroesophageal reflux disease)     Hidradenitis suppurativa     Hyperlipidemia     Hypertension     SHERRIE on CPAP     CPAP    Stress incontinence        Past Surgical History:   Procedure Laterality Date    BREAST SURGERY PROCEDURE UNLISTED      Right breast benign tumor removal    HX APPENDECTOMY      HX CHOLECYSTECTOMY      HX DILATION AND CURETTAGE      Dysfunctional uterine bleeding, thought 2/2 fibroids    HX TUBAL LIGATION         Family History   Problem Relation Age of Onset    Hypertension Mother     Stroke Mother     Breast Cancer Mother         Bilateral mastectomies    Cancer Mother         ovarian and breast    Heart Failure Mother     Heart Attack Father         2011    Heart Surgery Father         CABG    Heart Failure Father     COPD Sister         Heavy smoker    Cancer Sister         ovarian    Heart Failure Sister     Lung Disease Sister     Asthma Child     Cancer Maternal Aunt         pancreatic    Cancer Maternal Grandfather         stomach       Social History     Socioeconomic History    Marital status: LEGALLY      Spouse name: Not on file    Number of children: Not on file    Years of education: Not on file    Highest education level: Not on file   Tobacco Use    Smoking status: Former Smoker     Packs/day: 1.00     Years: 30.00     Pack years: 30.00     Types: Cigarettes     Start date: 5/6/1966     Last attempt to quit: 2006     Years since quittin.5    Smokeless tobacco: Never Used    Tobacco comment: 1-1.5 packs per day   Substance and Sexual Activity    Alcohol use: No    Drug use: No    Sexual activity: Never       Allergies   Allergen Reactions    Ancef [Cefazolin] Hives    Contrast Agent [Iodine] Anaphylaxis, Shortness of Breath and Swelling     Needs pre-medication for IV contrast with Benadryl, Solu-Medrol    Fish Containing Products Anaphylaxis     Pt states she had a severe allergic reaction at 9 y/o.  Statins-Hmg-Coa Reductase Inhibitors Myalgia    Metformin Other (comments)     Abdominal pain, diarrhea.  Codeine Other (comments)     Altered mental status       Prior to Admission Medications   Prescriptions Last Dose Informant Patient Reported? Taking? NOVOLOG FLEXPEN U-100 INSULIN 100 unit/mL inpn 3/23/2020 at Unknown time Self No Yes   Sig: Continue home Sliding scale insulin as prior to admission   Patient taking differently: 1 Units by SubCUTAneous route three (3) times daily. If BG <100=0u  101-150=5u  151-250=8u  251-300=12u  >300 call MD     OXYGEN-AIR DELIVERY SYSTEMS 3/23/2020 at Unknown time  Yes Yes   Si L by Nasal route continuous. [facility]   PROLENSA 0.07 % ophthalmic solution 3/23/2020 at Unknown time  Yes Yes   Sig: Administer 1 Drop to right eye daily. albuterol sulfate (PROVENTIL;VENTOLIN) 2.5 mg/0.5 mL nebu nebulizer solution 3/23/2020 at Unknown time  No Yes   Si.5 mL by Nebulization route four (4) times daily as needed for Wheezing. To be used with HyperSal nebulizer solution. ICD code: COPD J44.1   albuterol sulfate 90 mcg/actuation ae 3/23/2020 at Unknown time  No Yes   Sig: Take 1 Puff by inhalation every four (4) hours. Patient taking differently: Take 1 Puff by inhalation every four (4) hours as needed.    amoxicillin-clavulanate (AUGMENTIN) 875-125 mg per tablet Not Taking at Unknown time  No No   Sig: Take 1 Tab by mouth every twelve (12) hours. aspirin delayed-release 81 mg tablet 3/23/2020 at Unknown time  Yes Yes   Sig: Take 81 mg by mouth daily. famotidine (PEPCID) 20 mg tablet 3/23/2020 at Unknown time  Yes Yes   Sig: Take 20 mg by mouth two (2) times daily as needed for Other (reflux). fluticasone propion-salmeterol (ADVAIR HFA) 702-05 mcg/actuation inhaler 3/23/2020 at Unknown time  Yes Yes   Sig: Take 2 Puffs by inhalation two (2) times a day. fluticasone propionate (FLONASE ALLERGY RELIEF) 50 mcg/actuation nasal spray 3/23/2020 at Unknown time  Yes Yes   Si Sprays by Both Nostrils route daily. hydroCHLOROthiazide (HYDRODIURIL) 12.5 mg tablet 3/23/2020 at Unknown time  Yes Yes   Sig: Take 12.5 mg by mouth daily. insulin glargine (LANTUS,BASAGLAR) 100 unit/mL (3 mL) inpn 3/23/2020 at Unknown time  No Yes   Si Units by SubCUTAneous route nightly. lactobacillus sp. 50 billion cpu (BIO-K PLUS) 50 billion cell -375 mg cap capsule 3/23/2020 at Unknown time  No Yes   Sig: Take 1 Cap by mouth daily. lisinopril (PRINIVIL, ZESTRIL) 20 mg tablet 3/23/2020 at Unknown time  Yes Yes   Sig: Take 20 mg by mouth two (2) times a day. loratadine (CLARITIN) 10 mg tablet 3/23/2020 at Unknown time  Yes Yes   Sig: Take 10 mg by mouth daily as needed for Allergies. montelukast (SINGULAIR) 10 mg tablet 3/23/2020 at Unknown time  Yes Yes   Sig: Take 10 mg by mouth nightly. omeprazole (PRILOSEC) 40 mg capsule 3/23/2020 at Unknown time  Yes Yes   Sig: Take 40 mg by mouth daily. Indications: gastroesophageal reflux disease   predniSONE (DELTASONE) 10 mg tablet 3/16/2020 at Unknown time  Yes Yes   Sig: Take 10 mg by mouth every other day. predniSONE (DELTASONE) 20 mg tablet 2020 at Unknown time  No Yes   Sig: Take 60mg(3tabs) for 3 days, Take 40mg(2tabs) for 3 days, Take 20mg(1 tab) for 3 days, and then 10mg for 1 day. Then resume 10mg every other day.    simethicone (GAS-X) 125 mg capsule 3/23/2020 at Unknown time  Yes Yes   Sig: Take 125 mg by mouth four (4) times daily as needed for Flatulence. tiotropium bromide (SPIRIVA RESPIMAT) 2.5 mcg/actuation inhaler 3/23/2020 at Unknown time  Yes Yes   Sig: Take 2 Puffs by inhalation daily. Facility-Administered Medications: None       Physical Exam:     Patient Vitals for the past 24 hrs:   Temp Pulse Resp BP SpO2   03/23/20 1711 -- -- -- -- 99 %   03/23/20 1656 -- 81 22 -- 97 %   03/23/20 1434 97.5 °F (36.4 °C) 94 26 182/83 96 %       Physical Exam:  General:  AAOx3, bipap on, took it off to speak with us. Relatively comfortable  HEENT: Conjunctiva pink, sclera anicteric. PERRL. EOMI. Thyroid not enlarged, no nodules. No cervical, supraclavicular, occipital or submandibular lymphadenopathy. No other gross abnormalities present. CV:  RRR, no murmurs. No visible pulsations or thrills. RESP:  Unlabored breathing. Lungs with faint wheezes bilaterally. Equal expansion bilaterally. ABD:  Soft, mildly tender over umbilical hernia at baseline, nondistended. BS (+). No hepatosplenomegaly. No suprapubic tenderness. MS:  No joint deformity or instability. No atrophy. Neuro:  CN II-XII grossly intact. 5/5 strength bilateral upper extremities and lower extremities. Ext:  No edema. 2+ radial and dp pulses bilaterally. Skin:  No rashes, lesions, or ulcers. Good turgor.       Chemistry Recent Labs     03/23/20  1513   *      K 4.0      CO2 30   BUN 9   CREA 0.90   CA 8.9   MG 1.6   AGAP 3   BUCR 10*   AP 83   TP 7.3   ALB 3.4   GLOB 3.9   AGRAT 0.9        CBC w/Diff Recent Labs     03/23/20  1513   WBC 5.4   RBC 4.40   HGB 13.1   HCT 38.0      GRANS 61   LYMPH 28   EOS 3        Liver Enzymes Protein, total   Date Value Ref Range Status   03/23/2020 7.3 6.4 - 8.2 g/dL Final     Albumin   Date Value Ref Range Status   03/23/2020 3.4 3.4 - 5.0 g/dL Final     Globulin   Date Value Ref Range Status   03/23/2020 3.9 2.0 - 4.0 g/dL Final     A-G Ratio   Date Value Ref Range Status   03/23/2020 0.9 0.8 - 1.7   Final     AST (SGOT)   Date Value Ref Range Status   03/23/2020 30 10 - 38 U/L Final     Alk. phosphatase   Date Value Ref Range Status   03/23/2020 83 45 - 117 U/L Final     Recent Labs     03/23/20  1513   TP 7.3   ALB 3.4   GLOB 3.9   AGRAT 0.9   SGOT 30   AP 83        Lactic Acid Lactic acid   Date Value Ref Range Status   09/04/2019 2.0 0.4 - 2.0 MMOL/L Final     No results for input(s): LAC in the last 72 hours. BNP No results found for: BNP, BNPP, XBNPT     Cardiac Enzymes Lab Results   Component Value Date/Time    TROIQ <0.02 03/23/2020 03:13 PM        Coagulation No results for input(s): PTP, INR, APTT, INREXT in the last 72 hours. Thyroid  Lab Results   Component Value Date/Time    TSH 2.76 09/18/2016 02:20 PM          Lipid Panel Lab Results   Component Value Date/Time    Cholesterol, total 220 (H) 11/20/2012 03:22 AM    HDL Cholesterol 62 (H) 11/20/2012 03:22 AM    LDL, calculated 144.6 (H) 11/20/2012 03:22 AM    VLDL, calculated 13.4 11/20/2012 03:22 AM    Triglyceride 67 11/20/2012 03:22 AM    CHOL/HDL Ratio 3.5 11/20/2012 03:22 AM        ABG No results for input(s): PHI, PHI, POC2, PCO2I, PO2, PO2I, HCO3, HCO3I, FIO2, FIO2I in the last 72 hours.      Urinalysis Lab Results   Component Value Date/Time    Color YELLOW 03/05/2020 06:34 AM    Appearance CLEAR 03/05/2020 06:34 AM    Specific gravity 1.029 03/05/2020 06:34 AM    pH (UA) 5.0 03/05/2020 06:34 AM    Protein NEGATIVE  03/05/2020 06:34 AM    Glucose >1,000 (A) 03/05/2020 06:34 AM    Ketone TRACE (A) 03/05/2020 06:34 AM    Bilirubin NEGATIVE  03/05/2020 06:34 AM    Urobilinogen 0.2 03/05/2020 06:34 AM    Nitrites NEGATIVE  03/05/2020 06:34 AM    Leukocyte Esterase NEGATIVE  03/05/2020 06:34 AM    Epithelial cells 4+ 08/30/2019 09:17 PM    Bacteria 3+ (A) 08/30/2019 09:17 PM    WBC 4 to 11 08/30/2019 09:17 PM    RBC NEGATIVE  08/30/2019 09:17 PM        Micro No results for input(s): SDES, CULT in the last 72 hours. No results for input(s): CULT in the last 72 hours. Imaging:  XR (Most Recent). Results from [place] encounter on 03/23/20   XR CHEST PORT    Narrative EXAM:  Chest Portable. INDICATION:  Shortness of breath, COPD     COMPARISON:  03/13/20     TECHNIQUE:  Portable AP chest study    FINDINGS:      - Both lungs are clear.   - No pleural effusion or pneumothorax is detected. - Cardiac silhouette, mediastinum and hilar regions appear unremarkable. - No significant interval changes are noted. .      Impression IMPRESSION:    No acute abnormalities. CT (Most Recent) Results from Hospital Encounter encounter on 07/14/19   CT ABD PELV WO CONT    Narrative HISTORY: Right-sided abdominal pain since this morning. Karo Granda EXAM: CT abdomen and pelvis. TECHNIQUE: Spiral images of the abdomen and pelvis were performed according to  routine protocol without intravenous contrast. Coronal and sagittal  reconstructions were provided for evaluation. Oral contrast  was not given. COMPARISON: None. All CT scans at this facility are performed using dose optimization technique as  appropriate to a performed exam, to include automated exposure control,  adjustment of the mA and/or kV according to patient size (including appropriate  matching for site-specific examinations), or use of iterative reconstruction  technique. ABDOMEN FINDINGS: Limited evaluation of abdomen and pelvis given lack of oral  and intravenous contrast.    Emphysematous changes noted bilaterally. Liver: Hepatic steatosis. Limited evaluation. Gallbladder: Has been removed. No biliary ductal dilatation. Pancreas: Normal attenuation without mass or ductal dilatation. Spleen: [Unremarkable.]    Adrenal Glands: Unremarkable. Kidneys:     Cortical renal thinning bilaterally. No hydronephrosis or hydroureter. No  nephrolithiasis. Lymph Nodes: No large adenopathy.     Aorta:   Grossly unremarkable    PELVIS FINDINGS:     Bowel: Small Bowel: Normal in caliber with normal wall thickness. Large Bowel: Normal in caliber with normal wall thickness. Limited evaluation of  large bowel given presence of fecal material and lack of oral contrast.    Appendix: Not seen. Bladder: Limited evaluation given its under distention. No ascites or pneumoperitoneum. Umbilical hernia is demonstrated with loop of small bowel as well as omental fat  present within the defect. No evidence of obstruction. Hernia defect measures  approximately series 2 image 61 2.5 cm. Marked body habitus. Bones: No lytic or blastic lesions. Impression IMPRESSION:    1. Umbilical hernia with single loop of small bowel as well as omental fat  present within the defect without evidence of obstruction is measured above. 2.   Medical renal disease. 3. Cholecystectomy. 4. Hepatic steatosis. Follow-up with liver ultrasound and LFTs is recommended. Aniket Hollins ECHO No results found for this or any previous visit. EKG No results found for this or any previous visit. Recent Results (from the past 12 hour(s))   CBC WITH AUTOMATED DIFF    Collection Time: 03/23/20  3:13 PM   Result Value Ref Range    WBC 5.4 4.6 - 13.2 K/uL    RBC 4.40 4.20 - 5.30 M/uL    HGB 13.1 12.0 - 16.0 g/dL    HCT 38.0 35.0 - 45.0 %    MCV 86.4 74.0 - 97.0 FL    MCH 29.8 24.0 - 34.0 PG    MCHC 34.5 31.0 - 37.0 g/dL    RDW 13.6 11.6 - 14.5 %    PLATELET 308 343 - 390 K/uL    MPV 9.0 (L) 9.2 - 11.8 FL    NEUTROPHILS 61 40 - 73 %    LYMPHOCYTES 28 21 - 52 %    MONOCYTES 8 3 - 10 %    EOSINOPHILS 3 0 - 5 %    BASOPHILS 0 0 - 2 %    ABS. NEUTROPHILS 3.3 1.8 - 8.0 K/UL    ABS. LYMPHOCYTES 1.5 0.9 - 3.6 K/UL    ABS. MONOCYTES 0.4 0.05 - 1.2 K/UL    ABS. EOSINOPHILS 0.2 0.0 - 0.4 K/UL    ABS.  BASOPHILS 0.0 0.0 - 0.1 K/UL    DF AUTOMATED     METABOLIC PANEL, COMPREHENSIVE    Collection Time: 03/23/20  3:13 PM   Result Value Ref Range    Sodium 136 136 - 145 mmol/L    Potassium 4.0 3.5 - 5.5 mmol/L    Chloride 103 100 - 111 mmol/L    CO2 30 21 - 32 mmol/L    Anion gap 3 3.0 - 18 mmol/L    Glucose 320 (H) 74 - 99 mg/dL    BUN 9 7.0 - 18 MG/DL    Creatinine 0.90 0.6 - 1.3 MG/DL    BUN/Creatinine ratio 10 (L) 12 - 20      GFR est AA >60 >60 ml/min/1.73m2    GFR est non-AA >60 >60 ml/min/1.73m2    Calcium 8.9 8.5 - 10.1 MG/DL    Bilirubin, total 0.5 0.2 - 1.0 MG/DL    ALT (SGPT) 67 (H) 13 - 56 U/L    AST (SGOT) 30 10 - 38 U/L    Alk. phosphatase 83 45 - 117 U/L    Protein, total 7.3 6.4 - 8.2 g/dL    Albumin 3.4 3.4 - 5.0 g/dL    Globulin 3.9 2.0 - 4.0 g/dL    A-G Ratio 0.9 0.8 - 1.7     MAGNESIUM    Collection Time: 03/23/20  3:13 PM   Result Value Ref Range    Magnesium 1.6 1.6 - 2.6 mg/dL   TROPONIN I    Collection Time: 03/23/20  3:13 PM   Result Value Ref Range    Troponin-I, QT <0.02 0.0 - 0.045 NG/ML   NT-PRO BNP    Collection Time: 03/23/20  3:13 PM   Result Value Ref Range    NT pro-BNP 35 0 - 900 PG/ML       Assessment/Plan:   Alexis De Dios is a 60 y.o. female with PMH of COPD on home O2, IDDM2, HTN, HFpEF, SHERRIE on CPAP, GERD, allergic rhinitis, now admitted for COPD exacerbation. COPD exacerbation  Likely 2/2 viral infx vs pneumonia vs seasonal allergies. Has hx of asthma/COPD on 2L home 02 and sleeps with panda. Followed by Dr. Gurinder Eubanks in OP. Per last pulm note pt may have environmental triggers. Previously hospitalized 4x in 2019 for COPD exacerbations, last hospitalization 3/4-3/9. In ED, afebrile, clear CXR, normal WBCs. Given 1 duoneb and placed on bipap. Satting 95-97% on room air at admission. Speaking in normal sentences with very mild increased respiratory effort. Symptoms started to worsen ~ 3 days prior to admission when steroid taper from prior admission ended. Pt reporting that if she were to go home tonight she would come back in tomorrow with breathing trouble again.  Says she does not like being on steroids for a long time. No sick contacts. Cardiac enzymes and BNP WNL. Plan  -admit to stepdown for BIPAP  -oxygen as needed, goal 88-92%  -cardiac monitoring  -BIPAP  -NPO while on BIPAP  -CBC, BMP daily  -continue bronchodilators  -Methylprednisolone 60 PO daily  -Duonebs q4h   -mIVF D5 1/2NS 20 K @ 125 while NPO  -vitals per routine  -consult to CM/PT/OT  -Continue home Singulair and loratadine  -Home advair>arformotorol+budesonide   -Consider prolonged steroid taper, likely DC soon    SHERRIE/pulm htn  Intolerant to Cpap. On trelegy nightly.     Insuline dependent Type 2 Diabetes  Home lantus 35u and novolog SSI.  on admission. Pt will be NPO while on BIPAP but also starting steroids and pt has been poorly controlled outpatient.  -Lantus 15U and increase as needed up to home dose 35 once eating  -SSI  -poc glucose achs     Hypertension, HFpEF  Stable. Echo 7/7/18 EF 66-70%, LV mild concentric hypertrophy, No left regional wall motion abnorm. SBP in ED elevated 180s.  -continue home lisinopril 20mg bid, HCTZ 12.5mg daily     GERD  Takes omeprazole 40mg qD and pepcid for breakthrough symptoms.   -continue home regimen      Allergic Rhinitis  -home flonase, singulair 10mg      Diet: npo, diabetic diet when off bipap  DVT Prophylaxis: [facility]  Code Status: Full Code  [place] [address], daughter, [phone]     Disposition and anticipated LOS:  2 Aisha Blankenship MD , PGY-1   [address] [place]   Intern Pager: [phone]   March 23, 2020, 8:49 PM          Senior Addendum to History and Physical  [facility]        Patient: Namita Pickett MRN: [id number]  CSN: [id number]    YOB: 1960  Age: 60 y.o.   Sex: female       DOA: 3/23/2020       HPI:      Namita Pickett is a 60 y.o. female with PMH COPD on home O2, IDDM2, HTN, HFpEF, SHERRIE on CPAP, GERD, allergic rhinitis, now admitted with COPD Exacerbation.     HPI, ROS, PMH, PSH, Family Hx, Social Hx, Home medications, and allergies as above in intern H&P. Which has been reviewed in full. Additional comments to subjective history include:     Physical Exam:      Physical Exam:  General:  Alert and Responsive and in No acute distress. HEENT: Conjunctiva pink, sclera anicteric. PERRL. EOMI. Pharynx moist, nonerythematous. Moist mucous membranes. No other gross abnormalities present. CV:  RRR, no murmurs. PMI not displaced. No visible pulsations or thrills. No carotid bruits. RESP:  Satting 94-99% on room air. Able to speak in full sentences. Slight increase in respiratory effort. Diminished air flow, scattered wheeze bilaterally. ABD:  Soft, nontender, nondistended. Umbilical hernia. MS:  No joint deformity or instability. No atrophy. Neuro:  5/5 strength bilateral upper extremities and lower extremities. A+Ox3. Ext:  No edema. 2+ radial and dp pulses bilaterally. Skin:  No rashes, lesions, or ulcers. Good turgor.     Labs and imaging reviewed      Assessment/Plan:   60 y.o. female with PMH COPD on home O2, IDDM2, HTN, HFpEF, SHERRIE on CPAP, GERD, allergic rhinitis, now admitted with COPD Exacerbation.      Acute on chronic hypoxic respiratory failure likely secondary to COPD Exacerbation - Patient with COPD GOLD D with asthma component, steroid dependent. Properly prescribed ICS + LABA based on GOLD criteria. Finished steroid taper three days ago. Is on Trilogy machine at night and 2 L nasal canula at baseline. Does not appear infectious.  Currently no URI symptoms except for dry chronic cough.   -admit to med floor   -vitals per routine  -oxygen as needed, goal 88-92%  -BIPAP PRN  -NPO (reduce Lantus to 20 units)   -CBC, BMP daily  -ABG PRN   -methylprednisolone 60 mg daily   -Duonebs q4h   -Continue home medications, pharmacy to substitute advair.      Active Problems:    Acute exacerbation of chronic obstructive pulmonary disease (COPD) ([facility] [address] (10/7/2018)        For additional problem list, assessment, and plan see intern note     Love Morrow,   3/23/2020, 7:43 PM

## 2020-03-24 NOTE — PROGRESS NOTES
[facility]  Progress Note    Patient: Gene Rivers MRN: [id number]   SSN: [id number]  YOB: 1960   Age: 60 y.o. Sex: female      Admit Date: 3/23/2020    LOS: 1 day   Chief Complaint   Patient presents with    COPD       Subjective:     Patient still endorsing SOB. States that it is difficult for her to breath even at rest. Feels like the bipap helps while it is on, but when it is off she feels worse. Review of Systems   Constitutional: Negative for fever. Respiratory: Positive for shortness of breath. Negative for cough and sputum production. Cardiovascular: Negative for chest pain and leg swelling. Gastrointestinal: Negative for abdominal pain, constipation, diarrhea, nausea and vomiting. Objective:     Visit Vitals  /79 (BP 1 Location: Left arm, BP Patient Position: At rest)   Pulse 67   Temp 97.7 °F (36.5 °C)   Resp 20   Ht 5' 3\" (1.6 m)   Wt 111.5 kg (245 lb 14.4 oz)   SpO2 100%   BMI 43.56 kg/m²       Physical Exam:   Physical Exam  Vitals signs and nursing note reviewed. Constitutional:       Appearance: She is not toxic-appearing. Eyes:      General: No scleral icterus. Conjunctiva/sclera: Conjunctivae normal.   Cardiovascular:      Rate and Rhythm: Normal rate and regular rhythm. Pulses: Normal pulses. Heart sounds: Normal heart sounds. Pulmonary:      Comments: Mild respiratory distress, but speaking in full sentences. Sounds tight bilaterally    Abdominal:      General: Bowel sounds are normal. There is no distension. Palpations: Abdomen is soft. Tenderness: There is no abdominal tenderness. Musculoskeletal:         General: No swelling. Skin:     General: Skin is warm and dry. Neurological:      Mental Status: She is alert. Mental status is at baseline.              Lab/Data Review:  Recent Results (from the past 12 hour(s))   GLUCOSE, POC    Collection Time: 03/23/20  9:23 PM   Result Value Ref Range Glucose (POC) 146 (H) 70 - 110 mg/dL   POC G3    Collection Time: 03/23/20 10:28 PM   Result Value Ref Range    Device: BIPAP      FIO2 (POC) 35 %    pH (POC) 7.358 7.35 - 7.45      pCO2 (POC) 51.4 (H) 35.0 - 45.0 MMHG    pO2 (POC) 150 (H) 80 - 100 MMHG    HCO3 (POC) 28.9 (H) 22 - 26 MMOL/L    sO2 (POC) 99 (H) 92 - 97 %    Base excess (POC) 3 mmol/L    PEEP/CPAP (POC) 6 cmH2O    PIP (POC) 14      Pressure support 8 cmH2O    Allens test (POC) YES      Bleed In 35 L/min    Total resp. rate 24      Site LEFT RADIAL      Specimen type (POC) ARTERIAL      Performed by Dewanda Merlin     Spontaneous timed YES     METABOLIC PANEL, BASIC    Collection Time: 03/24/20  5:20 AM   Result Value Ref Range    Sodium 140 136 - 145 mmol/L    Potassium 3.8 3.5 - 5.5 mmol/L    Chloride 105 100 - 111 mmol/L    CO2 30 21 - 32 mmol/L    Anion gap 5 3.0 - 18 mmol/L    Glucose 173 (H) 74 - 99 mg/dL    BUN 8 7.0 - 18 MG/DL    Creatinine 0.74 0.6 - 1.3 MG/DL    BUN/Creatinine ratio 11 (L) 12 - 20      GFR est AA >60 >60 ml/min/1.73m2    GFR est non-AA >60 >60 ml/min/1.73m2    Calcium 8.5 8.5 - 10.1 MG/DL   CBC WITH AUTOMATED DIFF    Collection Time: 03/24/20  5:20 AM   Result Value Ref Range    WBC 5.4 4.6 - 13.2 K/uL    RBC 4.21 4.20 - 5.30 M/uL    HGB 12.6 12.0 - 16.0 g/dL    HCT 37.2 35.0 - 45.0 %    MCV 88.4 74.0 - 97.0 FL    MCH 29.9 24.0 - 34.0 PG    MCHC 33.9 31.0 - 37.0 g/dL    RDW 13.9 11.6 - 14.5 %    PLATELET 889 939 - 508 K/uL    MPV 9.3 9.2 - 11.8 FL    NEUTROPHILS 48 40 - 73 %    LYMPHOCYTES 39 21 - 52 %    MONOCYTES 10 3 - 10 %    EOSINOPHILS 3 0 - 5 %    BASOPHILS 0 0 - 2 %    ABS. NEUTROPHILS 2.6 1.8 - 8.0 K/UL    ABS. LYMPHOCYTES 2.1 0.9 - 3.6 K/UL    ABS. MONOCYTES 0.5 0.05 - 1.2 K/UL    ABS. EOSINOPHILS 0.2 0.0 - 0.4 K/UL    ABS.  BASOPHILS 0.0 0.0 - 0.1 K/UL    DF AUTOMATED               Assessment and Plan:     Ricky Found a 60 y.o. female with PMH of COPD on home O2, IDDM2, HTN, HFpEF, SHERRIE on CPAP, GERD, allergic rhinitis, now admitted for COPD exacerbation.      COPD exacerbation  Likely 2/2 viral infx vs pneumonia vs seasonal allergies. Has hx of asthma/COPD on 2L home 02 and sleeps with trellegy. Has been using the trelegy more consistently while awake. Followed by Dr. Roshan Escamilla in OP. Per last pulm note pt may have environmental triggers. Previously hospitalized 4x in 2019 for COPD exacerbations, last hospitalization 3/4-3/9. In ED, afebrile, clear CXR, normal WBCs. Given 1 duoneb and placed on bipap. Satting 95-97% on room air at admission. Continues to speak in normal sentences with mild increased respiratory effort. Symptoms started to worsen ~ 3 days prior to admission when steroid taper from prior admission ended. No sick contacts. Cardiac enzymes and BNP WNL. Plan  -oxygen as needed, goal 88-92%  - Wean O2 to 2L as tolerated  - 6 min walking trial to be done prior to discharge  -cardiac monitoring  -BIPAP PRN  -NPO while on BIPAP  -CBC, BMP daily  -continue bronchodilators  -Methylprednisolone 60 PO daily  -Duonebs q4h   -mIVF D5 1/2NS 20 K @ 125 while NPO  -vitals per routine  -consult to CM/PT/OT  -Continue home Singulair and loratadine  -Home advair>arformotorol+budesonide   -Consider prolonged steroid taper, likely DC tomorrow     SHERRIE/pulm htn  Intolerant to Cpap. On trelegy nightly.     Insuline dependent Type 2 Diabetes  Home lantus 35u and novolog SSI.  on admission. Pt will be NPO while on BIPAP but also starting steroids and pt has been poorly controlled outpatient.  -Lantus 15U and increase as needed up to home dose 35 once eating  -SSI  -poc glucose achs     Hypertension, HFpEF  Stable.  Echo 7/7/18 EF 66-70%, LV mild concentric hypertrophy, No left regional wall motion abnorm. SBP in ED elevated 180s.  -continue home lisinopril 20mg bid, HCTZ 12.5mg daily     GERD  Takes omeprazole 40mg qD and pepcid for breakthrough symptoms.   -continue home regimen      Allergic Rhinitis  -home flonase, singulair 10mg      Diet: npo, diabetic diet when off bipap  DVT Prophylaxis: SQH  Code Status: Full Code  [facility] [address], daughter, [phone]     Disposition and anticipated LOS: Michelle Jimenez MD, PGY-1   [address]   Senior Pager: [phone]   March 24, 2020, 7:42 AM

## 2020-03-24 NOTE — ED NOTES
TRANSFER - OUT REPORT:    Verbal report given to JOSÉ MIGUEL Strong on Jose Multani  being transferred to [facility] for routine progression of care       Report consisted of patients Situation, Background, Assessment and   Recommendations(SBAR). Information from the following report(s) SBAR, ED Summary, [facility] and Cardiac Rhythm NSR was reviewed with the receiving nurse. Lines:   Peripheral IV 03/23/20 Right Hand (Active)        Opportunity for questions and clarification was provided.       Patient transported with:   Monitor  Registered Nurse

## 2020-03-24 NOTE — PROGRESS NOTES
Problem: Mobility Impaired (Adult and Pediatric)  Goal: *Acute Goals and Plan of Care (Insert Text)  Description: Physical Therapy Goals  Initiated 3/24/2020 and to be accomplished within 7 day(s)  1. Patient will move from supine to sit and sit to supine , scoot up and down and roll side to side in bed with modified independence. 2.  Patient will transfer from bed to chair and chair to bed with modified independence using the least restrictive device. 3.  Patient will perform sit to stand with modified independence. 4.  Patient will ambulate with modified independence for 150 feet with the least restrictive device. 5.  Patient will ascend/descend 2-10 stairs with 0-1 handrail(s) with supervision/set-up. Prior Level of Function:   Patient was modified independence for all mobility including gait using no AD but used a RW as needed. Patient lives with family in a 2 story home with bedroom on first floor and bathroom on bottom floor. Pt is able to use bedside commode on bottom floor if needed initially. She uses 2L of O2 at home. Pt denies need for home health. Outcome: Progressing Towards Goal   PHYSICAL THERAPY EVALUATION    Patient: Fred Long (5 y.o. female)  Date: 3/24/2020  Primary Diagnosis: Acute exacerbation of chronic obstructive pulmonary disease (COPD) ([facility] [J44.1]  COPD exacerbation ([facility]) [J44.1]        Precautions:   Fall(enteric (no symptoms might be removed per nursing)`)    ASSESSMENT :  PT orders received and patient cleared by nursing to participate with therapy. Patient is a 60 y.o. female admitted to the hospital due to SOB. Patient consents to PT evaluation and treatment. PT followed enteric precautions today but per nursing might be removed later today. Pt sitting edge of bed upon arrival. Performed sit to stands supervision. Gait in room supervision no AD 20 feet. Pt limited by SOB with SpO2 94% with gait with 3L of O2 donned.  Educated on energy conservation and breathing techniques. Nursing in present fixing pt's IV at this time. Pt will benefit from another visit if still in the hospital for longer gait distance. Pt is able to march in place for clearance of steps but discussed using bedside commode downstairs initially until endurance/activity tolerance improves for stairs in home. Pt denies need for a home therapist. Pt ended therapy sitting edge of bed with all needs met. Patient will benefit from skilled intervention to address the above impairments. Patient's rehabilitation potential is considered to be Good  Factors which may influence rehabilitation potential include:    []         None noted  [x]         Mental ability/status  []         Medical condition  []         Home/family situation and support systems  []         Safety awareness  []         Pain tolerance/management  []         Other:      PLAN :  Recommendations and Planned Interventions:    [x]           Bed Mobility Training             [x]    Neuromuscular Re-Education  [x]           Transfer Training                   []    Orthotic/Prosthetic Training  [x]           Gait Training                          [x]    Modalities  [x]           Therapeutic Exercises           [x]    Edema Management/Control  [x]           Therapeutic Activities            [x]    Family Training/Education  [x]           Patient Education  []           Other (comment):    Frequency/Duration: Patient will be followed by physical therapy 1-2 times per day/4-7 days per week to address goals. Discharge Recommendations: would benefit from home health but pt denies need. Possible outpatient pulmonary rehab for increased activity tolerance  Further Equipment Recommendations for Discharge: N/A     SUBJECTIVE:   Patient stated I don't need that (home health).     OBJECTIVE DATA SUMMARY:     Past Medical History:   Diagnosis Date    Asthma     Chronic lung disease     COPD     Cystocele, midline     Diabetes mellitus (HCC)     GERD (gastroesophageal reflux disease)     Hidradenitis suppurativa     Hyperlipidemia     Hypertension     SHERRIE on CPAP     CPAP    Stress incontinence      Past Surgical History:   Procedure Laterality Date    BREAST SURGERY PROCEDURE UNLISTED      Right breast benign tumor removal    HX APPENDECTOMY      HX CHOLECYSTECTOMY      HX DILATION AND CURETTAGE      Dysfunctional uterine bleeding, thought 2/2 fibroids    HX TUBAL LIGATION       Barriers to Learning/Limitations: None  Compensate with: N/A  Home Situation:  Home Situation  Home Environment: Private residence  # Steps to Enter: 2  Rails to Enter: No  One/Two Story Residence: Two story(sleeps on first floor but bathroom on 2nd floor)  Interior Rails: Right  Living Alone: No  Support Systems: Family member(s)  Patient Expects to be Discharged to[de-identified] Private residence  Current DME Used/Available at Home: Trang Pronto, rolling, Commode, bedside, Shower chair  Critical Behavior:  Neurologic State: Alert  Orientation Level: Oriented X4  Cognition: Follows commands  Safety/Judgement: Fall prevention  Psychosocial  Patient Behaviors: Calm; Cooperative  Purposeful Interaction: Yes  Pt Identified Daily Priority: Clinical issues (comment)  Caritas Process: Nurture loving kindness;Establish trust  Caring Interventions: Reassure; Therapeutic modalities  Reassure: Informing  Therapeutic Modalities: Humor; Intentional therapeutic touch                 B LE Strength:    Strength: Within functional limits              B LE Tone & Sensation:   Tone: Normal          Sensation: Intact           B LE Range Of Motion:  AROM: Within functional limits                 Posture:  Posture (WDL): Exceptions to WDL  Posture Assessment:  Forward head  Functional Mobility:  Bed Mobility:     Supine to Sit: Supervision  Sit to Supine: Supervision  Scooting: Supervision  Transfers:  Sit to Stand: Supervision   Stand to Sit: Supervision          Balance:   Sitting: Intact  Standing: Intact; Without support  Standing - Static: Good  Standing - Dynamic : (good-)    Ambulation/Gait Training:  Distance (ft): 20 Feet (ft)  Assistive Device: (none)  Ambulation - Level of Assistance: Supervision  Speed/Pam: Pace decreased (<100 feet/min)  Step Length: Right shortened;Left shortened          Therapeutic Exercises:   Reviewed and performed ankle pumps to increase blood flow and circulation. Pain:  No pain noted before, during, or at end of session. Activity Tolerance:   fair  Please refer to the flowsheet for vital signs taken during this treatment. After treatment:   []         Patient left in no apparent distress sitting up in chair  [x]         Patient left in no apparent distress in bed, sitting EOB  [x]         Call bell left within reach  [x]   Personal items in reach   [x]         Nursing notified Dime  []         Caregiver present  []         Bed/chair alarm activated  []         SCDs applied     COMMUNICATION/EDUCATION:   [x]         Role of Physical Therapy in the acute care setting. [x]         Fall prevention education was provided and the patient/caregiver indicated understanding. [x]         Patient/family have participated as able in goal setting and plan of care. [x]         Patient/family agree to work toward stated goals and plan of care. []         Patient understands intent and goals of therapy, but is neutral about his/her participation. []         Patient is unable to participate in goal setting/plan of care: ongoing with therapy staff. [x]         Out of bed with nursing assistance 3-5 times a day. []         Other:     Thank you for this referral.  Luca Moreno, PT, DPT   Time Calculation: 10 mins      Eval Complexity: History: MEDIUM  Complexity : 1-2 comorbidities / personal factors will impact the outcome/ POC Exam:HIGH Complexity : 4+ Standardized tests and measures addressing body structure, function, activity limitation and / or participation in recreation  Presentation: MEDIUM Complexity : Evolving with changing characteristics  Clinical Decision Making:Medium Complexity    Overall Complexity:MEDIUM

## 2020-03-24 NOTE — ED NOTES
Bedside and verbal shift report given by Maria Elena RN (off going nurse) to Patience Araya RN (oncoming nurse). Report included the following information SBAR and ED Summary.

## 2020-03-25 VITALS
TEMPERATURE: 97.2 F | DIASTOLIC BLOOD PRESSURE: 76 MMHG | RESPIRATION RATE: 22 BRPM | HEART RATE: 78 BPM | HEIGHT: 63 IN | BODY MASS INDEX: 45.46 KG/M2 | SYSTOLIC BLOOD PRESSURE: 155 MMHG | WEIGHT: 256.6 LBS | OXYGEN SATURATION: 96 %

## 2020-03-25 LAB
ANION GAP SERPL CALC-SCNC: 7 MMOL/L (ref 3–18)
BASOPHILS # BLD: 0 K/UL (ref 0–0.1)
BASOPHILS NFR BLD: 0 % (ref 0–2)
BUN SERPL-MCNC: 11 MG/DL (ref 7–18)
BUN/CREAT SERPL: 12 (ref 12–20)
CALCIUM SERPL-MCNC: 9.2 MG/DL (ref 8.5–10.1)
CHLORIDE SERPL-SCNC: 103 MMOL/L (ref 100–111)
CO2 SERPL-SCNC: 27 MMOL/L (ref 21–32)
CREAT SERPL-MCNC: 0.95 MG/DL (ref 0.6–1.3)
DIFFERENTIAL METHOD BLD: ABNORMAL
EOSINOPHIL # BLD: 0 K/UL (ref 0–0.4)
EOSINOPHIL NFR BLD: 0 % (ref 0–5)
ERYTHROCYTE [DISTWIDTH] IN BLOOD BY AUTOMATED COUNT: 13.4 % (ref 11.6–14.5)
GLUCOSE BLD STRIP.AUTO-MCNC: 281 MG/DL (ref 70–110)
GLUCOSE BLD STRIP.AUTO-MCNC: 337 MG/DL (ref 70–110)
GLUCOSE SERPL-MCNC: 310 MG/DL (ref 74–99)
HCT VFR BLD AUTO: 37.5 % (ref 35–45)
HGB BLD-MCNC: 12.8 G/DL (ref 12–16)
LYMPHOCYTES # BLD: 0.8 K/UL (ref 0.9–3.6)
LYMPHOCYTES NFR BLD: 16 % (ref 21–52)
MCH RBC QN AUTO: 29.6 PG (ref 24–34)
MCHC RBC AUTO-ENTMCNC: 34.1 G/DL (ref 31–37)
MCV RBC AUTO: 86.6 FL (ref 74–97)
MONOCYTES # BLD: 0.1 K/UL (ref 0.05–1.2)
MONOCYTES NFR BLD: 2 % (ref 3–10)
NEUTS SEG # BLD: 4.4 K/UL (ref 1.8–8)
NEUTS SEG NFR BLD: 82 % (ref 40–73)
PLATELET # BLD AUTO: 234 K/UL (ref 135–420)
PMV BLD AUTO: 9.4 FL (ref 9.2–11.8)
POTASSIUM SERPL-SCNC: 4.6 MMOL/L (ref 3.5–5.5)
RBC # BLD AUTO: 4.33 M/UL (ref 4.2–5.3)
SODIUM SERPL-SCNC: 137 MMOL/L (ref 136–145)
WBC # BLD AUTO: 5.4 K/UL (ref 4.6–13.2)

## 2020-03-25 PROCEDURE — 77010033678 HC OXYGEN DAILY

## 2020-03-25 PROCEDURE — 74011636637 HC RX REV CODE- 636/637: Performed by: STUDENT IN AN ORGANIZED HEALTH CARE EDUCATION/TRAINING PROGRAM

## 2020-03-25 PROCEDURE — 36415 COLL VENOUS BLD VENIPUNCTURE: CPT

## 2020-03-25 PROCEDURE — 94640 AIRWAY INHALATION TREATMENT: CPT

## 2020-03-25 PROCEDURE — 97530 THERAPEUTIC ACTIVITIES: CPT

## 2020-03-25 PROCEDURE — 74011250637 HC RX REV CODE- 250/637: Performed by: STUDENT IN AN ORGANIZED HEALTH CARE EDUCATION/TRAINING PROGRAM

## 2020-03-25 PROCEDURE — 85025 COMPLETE CBC W/AUTO DIFF WBC: CPT

## 2020-03-25 PROCEDURE — 74011000250 HC RX REV CODE- 250: Performed by: STUDENT IN AN ORGANIZED HEALTH CARE EDUCATION/TRAINING PROGRAM

## 2020-03-25 PROCEDURE — 82962 GLUCOSE BLOOD TEST: CPT

## 2020-03-25 PROCEDURE — 74011000250 HC RX REV CODE- 250: Performed by: FAMILY MEDICINE

## 2020-03-25 PROCEDURE — 74011250636 HC RX REV CODE- 250/636: Performed by: STUDENT IN AN ORGANIZED HEALTH CARE EDUCATION/TRAINING PROGRAM

## 2020-03-25 PROCEDURE — 94761 N-INVAS EAR/PLS OXIMETRY MLT: CPT

## 2020-03-25 PROCEDURE — 97116 GAIT TRAINING THERAPY: CPT

## 2020-03-25 PROCEDURE — 80048 BASIC METABOLIC PNL TOTAL CA: CPT

## 2020-03-25 RX ORDER — IPRATROPIUM BROMIDE 0.5 MG/2.5ML
500 SOLUTION RESPIRATORY (INHALATION)
Status: DISCONTINUED | OUTPATIENT
Start: 2020-03-25 | End: 2020-03-25 | Stop reason: HOSPADM

## 2020-03-25 RX ORDER — PREDNISONE 10 MG/1
10 TABLET ORAL EVERY OTHER DAY
Qty: 30 TAB | Refills: 3 | Status: ON HOLD | OUTPATIENT
Start: 2020-04-11 | End: 2020-04-02 | Stop reason: SDUPTHER

## 2020-03-25 RX ORDER — PREDNISONE 10 MG/1
TABLET ORAL
Qty: 52 TAB | Refills: 0 | Status: SHIPPED | OUTPATIENT
Start: 2020-03-25 | End: 2020-04-02

## 2020-03-25 RX ORDER — PREDNISONE 20 MG/1
60 TABLET ORAL
Status: DISCONTINUED | OUTPATIENT
Start: 2020-03-25 | End: 2020-03-25 | Stop reason: HOSPADM

## 2020-03-25 RX ORDER — INSULIN GLARGINE 100 [IU]/ML
35 INJECTION, SOLUTION SUBCUTANEOUS DAILY
Status: DISCONTINUED | OUTPATIENT
Start: 2020-03-25 | End: 2020-03-25 | Stop reason: HOSPADM

## 2020-03-25 RX ADMIN — FLUTICASONE PROPIONATE 2 SPRAY: 50 SPRAY, METERED NASAL at 08:51

## 2020-03-25 RX ADMIN — IPRATROPIUM BROMIDE 0.5 MG: 0.5 SOLUTION RESPIRATORY (INHALATION) at 13:10

## 2020-03-25 RX ADMIN — HYDROCHLOROTHIAZIDE 12.5 MG: 25 TABLET ORAL at 08:45

## 2020-03-25 RX ADMIN — IPRATROPIUM BROMIDE 0.5 MG: 0.5 SOLUTION RESPIRATORY (INHALATION) at 08:12

## 2020-03-25 RX ADMIN — INSULIN LISPRO 8 UNITS: 100 INJECTION, SOLUTION INTRAVENOUS; SUBCUTANEOUS at 11:51

## 2020-03-25 RX ADMIN — ARFORMOTEROL TARTRATE 15 MCG: 15 SOLUTION RESPIRATORY (INHALATION) at 08:11

## 2020-03-25 RX ADMIN — BUDESONIDE 250 MCG: 0.25 SUSPENSION RESPIRATORY (INHALATION) at 08:11

## 2020-03-25 RX ADMIN — HEPARIN SODIUM 5000 UNITS: 5000 INJECTION INTRAVENOUS; SUBCUTANEOUS at 06:06

## 2020-03-25 RX ADMIN — LISINOPRIL 20 MG: 20 TABLET ORAL at 08:45

## 2020-03-25 RX ADMIN — ASPIRIN 81 MG: 81 TABLET, COATED ORAL at 08:44

## 2020-03-25 RX ADMIN — PANTOPRAZOLE SODIUM 40 MG: 40 TABLET, DELAYED RELEASE ORAL at 08:45

## 2020-03-25 RX ADMIN — INSULIN LISPRO 6 UNITS: 100 INJECTION, SOLUTION INTRAVENOUS; SUBCUTANEOUS at 08:47

## 2020-03-25 NOTE — PROGRESS NOTES
Discharge planning    Writer spoke with Eden Ryder with [employer] concerning patient's DME equipment. Per Eden Ryder, patient has oxygen portable and stationary concentrator and nebulizer. Notified Jennifer Ortiz RN.     TRAVIS MirandaN, RN  Pager # [phone]  Care Manager

## 2020-03-25 NOTE — PROGRESS NOTES
2000 Bedside and Verbal shift change report given to Ligia RN (oncoming nurse) [facility] (offgoing nurse). Report included the following information SBAR, Recent Results, Med Rec Status and Cardiac Rhythm NSR.    2010 Nursing supevisor called and stated that she has assigned patient to 2S room 206.     2030 Called and gave report to 2S nurse. Report included SBAR, current results and plan. No assessment completed d/t transfer during shift change. 2050 Patient left unit via transport. Patient had no complaints of pain and no signs or symptoms of distress noted. agree agree

## 2020-03-25 NOTE — PROGRESS NOTES
[facility]  Progress Note    Patient: Nelida Danielle MRN: [id number]   SSN: [id number]  YOB: 1960   Age: 60 y.o. Sex: female      Admit Date: 3/23/2020    LOS: 2 days   Chief Complaint   Patient presents with    COPD       Subjective:     Patient feeling better today. Still feeling chest tightness and SOB, but much improved since admission. States that she is ready to go home. Review of Systems   Constitutional: Negative for fever. Respiratory: Negative for cough and shortness of breath. Cardiovascular: Negative for chest pain and leg swelling. Gastrointestinal: Negative for abdominal pain, constipation, diarrhea, nausea and vomiting. Objective:     Visit Vitals  /80 (BP 1 Location: Right arm, BP Patient Position: At rest)   Pulse 82   Temp 98.4 °F (36.9 °C)   Resp 21   Ht 5' 3\" (1.6 m)   Wt 116.4 kg (256 lb 9.6 oz)   SpO2 96%   BMI 45.45 kg/m²       Physical Exam:   Physical Exam  Vitals signs and nursing note reviewed. HENT:      Head: Normocephalic and atraumatic. Eyes:      General: No scleral icterus. Conjunctiva/sclera: Conjunctivae normal.   Cardiovascular:      Rate and Rhythm: Normal rate and regular rhythm. Pulmonary:      Effort: Pulmonary effort is normal. No respiratory distress. Breath sounds: Normal breath sounds. Abdominal:      General: Bowel sounds are normal. There is no distension. Palpations: Abdomen is soft. Tenderness: There is no abdominal tenderness. Skin:     General: Skin is warm and dry. Neurological:      Mental Status: She is alert. Mental status is at baseline.              Lab/Data Review:  Recent Results (from the past 12 hour(s))   GLUCOSE, POC    Collection Time: 03/24/20 10:26 PM   Result Value Ref Range    Glucose (POC) 143 (H) 70 - 712 mg/dL   METABOLIC PANEL, BASIC    Collection Time: 03/25/20  5:15 AM   Result Value Ref Range    Sodium 137 136 - 145 mmol/L    Potassium 4.6 3.5 - 5.5 mmol/L    Chloride 103 100 - 111 mmol/L    CO2 27 21 - 32 mmol/L    Anion gap 7 3.0 - 18 mmol/L    Glucose 310 (H) 74 - 99 mg/dL    BUN 11 7.0 - 18 MG/DL    Creatinine 0.95 0.6 - 1.3 MG/DL    BUN/Creatinine ratio 12 12 - 20      GFR est AA >60 >60 ml/min/1.73m2    GFR est non-AA >60 >60 ml/min/1.73m2    Calcium 9.2 8.5 - 10.1 MG/DL   CBC WITH AUTOMATED DIFF    Collection Time: 03/25/20  5:15 AM   Result Value Ref Range    WBC 5.4 4.6 - 13.2 K/uL    RBC 4.33 4.20 - 5.30 M/uL    HGB 12.8 12.0 - 16.0 g/dL    HCT 37.5 35.0 - 45.0 %    MCV 86.6 74.0 - 97.0 FL    MCH 29.6 24.0 - 34.0 PG    MCHC 34.1 31.0 - 37.0 g/dL    RDW 13.4 11.6 - 14.5 %    PLATELET 979 480 - 289 K/uL    MPV 9.4 9.2 - 11.8 FL    NEUTROPHILS 82 (H) 40 - 73 %    LYMPHOCYTES 16 (L) 21 - 52 %    MONOCYTES 2 (L) 3 - 10 %    EOSINOPHILS 0 0 - 5 %    BASOPHILS 0 0 - 2 %    ABS. NEUTROPHILS 4.4 1.8 - 8.0 K/UL    ABS. LYMPHOCYTES 0.8 (L) 0.9 - 3.6 K/UL    ABS. MONOCYTES 0.1 0.05 - 1.2 K/UL    ABS. EOSINOPHILS 0.0 0.0 - 0.4 K/UL    ABS. BASOPHILS 0.0 0.0 - 0.1 K/UL    DF AUTOMATED     GLUCOSE, POC    Collection Time: 03/25/20  7:46 AM   Result Value Ref Range    Glucose (POC) 281 (H) 70 - 110 mg/dL             Assessment and Plan:     Byron De Dios is a 60 y.o. female with PMH of COPD on home O2, IDDM2, HTN, HFpEF, SHERRIE on CPAP, GERD, allergic rhinitis, now admitted for COPD exacerbation.      COPD exacerbation   Likely 2/2 viral infx vs pneumonia vs seasonal allergies. Has hx of moderate persistent asthma/COPD on 2L home O2 and sleeps with trelegy. Followed by Dr. Kaylie Stout in OP. Patient feeling better today.  Still on 3L NC  Plan  -oxygen as needed, goal 88-92%  - Wean O2 to 2L as tolerated  - 6 min walking trial to be done prior to discharge  -cardiac monitoring  -BIPAP PRN  -CBC, BMP daily  -continue bronchodilators  -Methylprednisolone 60 PO daily  -Duonebs q4h   -mIVF D5 1/2NS 20 K @ 125 while NPO  -vitals per routine  -consult to CM/PT/OT  -Continue home Singulair and loratadine  -Home advair>arformotorol+budesonide   -Consider prolonged steroid taper, likely DC today     SHERRIE/pulm htn  Patient is morbidly obese which may contribute to her respiratory symptoms above and certainly contributes to her diabetes, HTN, SHERRIE, and GERD  Intolerant to Cpap. On trelegy nightly.     Insuline dependent Type 2 Diabetes  Home lantus 35u and novolog SSI. BS 320 on admission. Pt will be NPO while on BIPAP but also starting steroids and pt has been poorly controlled outpatient. - Will increase lantus 15U to home regimen of 35U  -SSI  -poc glucose achs     Hypertension, HFpEF  Stable.  Echo 7/7/18 EF 66-70%, LV mild concentric hypertrophy, No left regional wall motion abnorm. SBP in ED elevated 180s.  -continue home lisinopril 20mg bid, HCTZ 12.5mg daily     GERD  Takes omeprazole 40mg qD and pepcid for breakthrough symptoms.   -continue home regimen      Allergic Rhinitis  -home flonase, singulair 10mg      Diet: diabetic diet  DVT Prophylaxis: [facility]  Code Status: Full Code  [facility] [address], daughter, [phone]     Disposition and anticipated LOS: Diego Rueda MD, PGY-1   [address]   Senior Pager: [phone]   March 25, 2020, 8:02 AM

## 2020-03-25 NOTE — PROGRESS NOTES
conducted an initial consultation and Spiritual Assessment for Marla Marcum, who is a 60 y.o.,female. Patients Primary Language is: [place]. According to the patients EMR Yarsanism Affiliation is: Chilo [place].     The reason the Patient came to the hospital is:   Patient Active Problem List    Diagnosis Date Noted    Atelectasis of right lung 12/14/2018    Acute exacerbation of chronic obstructive pulmonary disease (COPD) ([address] 10/07/2018    Bilateral carotid bruits 07/30/2018    Palpitations 07/30/2018    Type 2 diabetes with nephropathy ([address] 05/30/2018    Hyperlipidemia 01/24/2018    COPD with acute bronchitis ([address] 59/71/0114    Diastolic CHF, chronic ([address] 02/09/2017    Diverticulosis 02/09/2017    COPD exacerbation ([address] 02/08/2017    Acute exacerbation of COPD with asthma ([address] 02/08/2017    Obesity, Class III, BMI 40-49.9 (morbid obesity) ([address] 08/09/2016    Chest pain 08/09/2016    Dyspnea 08/09/2016    COPD with acute exacerbation ([address] 05/24/2015    COPD (chronic obstructive pulmonary disease) (HCC) 03/27/2015    Allergic rhinitis 03/27/2015    Essential hypertension, benign 09/30/2012    Diabetes mellitus, type 2 ([address] 09/30/2012    Esophageal reflux 09/30/2012    SHERRIE on CPAP         The  provided the following Interventions:  Initiated a relationship of care and support. Listened empathically. Provided information about Spiritual Care Services. Chart reviewed. The following outcomes where achieved:  Patient expressed gratitude for 's visit. Assessment:  Patient does not have any Sabianism/cultural needs that will affect patients preferences in health care. There are no spiritual or Sabianism issues which require intervention at this time. Plan:  Chaplains will continue to follow and will provide pastoral care on an as needed/requested basis.    recommends bedside caregivers page  on duty if patient shows signs of acute spiritual or emotional distress.     [address]  ([phone])

## 2020-03-25 NOTE — PROGRESS NOTES
Discharge planning    Discharge order noted for today. Per Dr. Najma Herrera patient is refusing [place] and outpatient services. Patient and agreeable to the transition plan today. Transport has been arranged with family. Updated bedside RN, Iram,  to the transition plan.   Discharge information has been documented on the AVS.       TRAVIS ChaudharyN, RN  Pager # [phone]  Care Manager

## 2020-03-25 NOTE — PROGRESS NOTES
Problem: Mobility Impaired (Adult and Pediatric)  Goal: *Acute Goals and Plan of Care (Insert Text)  Description: Physical Therapy Goals  Initiated 3/24/2020 and to be accomplished within 7 day(s)  1. Patient will move from supine to sit and sit to supine , scoot up and down and roll side to side in bed with modified independence. 2.  Patient will transfer from bed to chair and chair to bed with modified independence using the least restrictive device. 3.  Patient will perform sit to stand with modified independence. 4.  Patient will ambulate with modified independence for 150 feet with the least restrictive device. 5.  Patient will ascend/descend 2-10 stairs with 0-1 handrail(s) with supervision/set-up. Prior Level of Function:   Patient was modified independence for all mobility including gait using no AD but used a RW as needed. Patient lives with family in a 2 story home with bedroom on first floor and bathroom on bottom floor. Pt is able to use bedside commode on bottom floor if needed initially. She uses 2L of O2 at home. Pt denies need for home health. Outcome: Progressing Towards Goal   PHYSICAL THERAPY TREATMENT    Patient: Nishi Lombardi (12 y.o. female)  Date: 3/25/2020  Diagnosis: Acute exacerbation of chronic obstructive pulmonary disease (COPD) ([facility] [J44.1]  COPD exacerbation ([facility]) [J44.1]   Acute exacerbation of chronic obstructive pulmonary disease (COPD) ([facility]       Precautions: Fall    ASSESSMENT:  Patient is cleared by nursing for PT, and patient consents to therapy. Pt performing supine to and from sit modified independent. Sit to stands modified independent. Gait with 2 L O2 donned 80 feet with RW with 1 standing rest break. Cues for breathing techniques. Pt is SOB after gait. Vitals prior HR 78 SpO2 96% and after gait  SpO2 93% (kept 2L O2 at all times as that is what she uses at home). Educated pt on safety for home and progressing mobility at home. Sitting and standing activities for increased activity tolerance. Practiced small marching in place no UE support for safety entering/exiting home. Pt ended therapy sitting edge of bed with all needs met. Progression toward goals:    [x]      Improving appropriately and progressing toward goals  []      Improving slowly and progressing toward goals  []      Not making progress toward goals and plan of care will be adjusted     PLAN:  Patient continues to benefit from skilled intervention to address the above impairments. Continue treatment per established plan of care. Discharge Recommendations:  Home Health if pt allows otherwise progress to outpatient pulmonary rehab  Further Equipment Recommendations for Discharge:  N/A     SUBJECTIVE:   Patient stated I didn't sleep well.     OBJECTIVE DATA SUMMARY:   Critical Behavior:  Neurologic State: Alert, Eyes open spontaneously  Orientation Level: Oriented X4  Cognition: Appropriate decision making, Appropriate for age attention/concentration, Appropriate safety awareness, Follows commands  Safety/Judgement: Fall prevention  Functional Mobility Training:  Bed Mobility:     Supine to Sit: Modified independent  Sit to Supine: Modified independent  Scooting: Modified independent         Transfers:  Sit to Stand: Modified independent  Stand to Sit: Modified independent                             Balance:  Sitting: Intact  Standing: Intact  Standing - Static: Good  Standing - Dynamic : Good     Ambulation/Gait Training:  Distance (ft): 80 Feet (ft)  Assistive Device: Walker, rolling  Ambulation - Level of Assistance: Supervision  Speed/Pam: Pace decreased (<100 feet/min)  Step Length: Right shortened;Left shortened       Therapeutic Exercises:   Reviewed and performed ankle pumps to increase blood flow and circulation. Pain:  No pain noted before, during, or at end of session.       Activity Tolerance:   fair  Please refer to the flowsheet for vital signs taken during this treatment. After treatment:   [] Patient left in no apparent distress sitting up in chair  [x] Patient left in no apparent distress in bed  [x] Call bell left within reach  [x] Nursing notified Erin Rowe  [x] Personal items in reach  [] Caregiver present  [] Bed/chair alarm activated  [] SCDs applied      COMMUNICATION/EDUCATION:   [x]         Role of Physical Therapy in the acute care setting. [x]         Fall prevention education was provided and the patient/caregiver indicated understanding. [x]         Patient/family have participated as able in working toward goals and plan of care. [x]         Patient/family agree to work toward stated goals and plan of care. []         Patient understands intent and goals of therapy, but is neutral about his/her participation. []         Patient is unable to participate in stated goals/plan of care: ongoing with therapy staff. [x]         Out of bed at least 3-5 times a day.    []         Other:        Jimy Perez, PT, DPT   Time Calculation: 24 mins

## 2020-03-25 NOTE — ROUTINE PROCESS
Bedside and Verbal shift change report given to Junior Lowry (oncoming nurse) by Balbir Torres (offgoing nurse). Report included the following information SBAR, Kardex and MAR. Patient quietly resting, call light in reach.

## 2020-03-25 NOTE — ROUTINE PROCESS
TRANSFER - IN REPORT:    Verbal report received from 2 Lata Barrios RN(name) on Deuce Hem  being received from CVT step down (unit) for routine progression of care      Report consisted of patients Situation, Background, Assessment and   Recommendations(SBAR). Information from the following report(s) SBAR, Kardex, [facility] and Cardiac Rhythm SR was reviewed with the receiving nurse. Opportunity for questions and clarification was provided. 2934 Assessment completed upon patients arrival to unit and care assumed. Patient oriented to unit, call light in use.

## 2020-03-25 NOTE — DISCHARGE INSTRUCTIONS
DISCHARGE SUMMARY from Erin Rowe RN    PATIENT INSTRUCTIONS:    After general anesthesia or intravenous sedation, for 24 hours or while taking prescription Narcotics:  · Limit your activities  · Do not drive and operate hazardous machinery  · Do not make important personal or business decisions  · Do  not drink alcoholic beverages  · If you have not urinated within 8 hours after discharge, please contact your surgeon on call. Report the following to your surgeon:  · Excessive pain, swelling, redness or odor of or around the surgical area  · Temperature over 100.5  · Nausea and vomiting lasting longer than 4 hours or if unable to take medications  · Any signs of decreased circulation or nerve impairment to extremity: change in color, persistent  numbness, tingling, coldness or increase pain  · Any questions    What to do at Home:  Recommended activity: Activity as tolerated  If you experience any of the following symptoms chest pain, dizziness or shortness of breath, please visit the nearest emergency department. *  Please give a list of your current medications to your Primary Care Provider. *  Please update this list whenever your medications are discontinued, doses are      changed, or new medications (including over-the-counter products) are added. *  Please carry medication information at all times in case of emergency situations. These are general instructions for a healthy lifestyle:    No smoking/ No tobacco products/ Avoid exposure to second hand smoke  Surgeon General's Warning:  Quitting smoking now greatly reduces serious risk to your health.     Obesity, smoking, and sedentary lifestyle greatly increases your risk for illness    A healthy diet, regular physical exercise & weight monitoring are important for maintaining a healthy lifestyle    You may be retaining fluid if you have a history of heart failure or if you experience any of the following symptoms:  Weight gain of 3 pounds or more overnight or 5 pounds in a week, increased swelling in our hands or feet or shortness of breath while lying flat in bed. Please call your doctor as soon as you notice any of these symptoms; do not wait until your next office visit. The discharge information has been reviewed with the patient. The patient verbalized understanding. Discharge medications reviewed with the patient and appropriate educational materials and side effects teaching were provided. ___________________________________________________________________________________________________________________________________  Patient Education        Chronic Obstructive Pulmonary Disease (COPD): Care Instructions  Your Care Instructions    Chronic obstructive pulmonary disease (COPD) is a general term for a group of lung diseases, including emphysema and chronic bronchitis. People with COPD have decreased airflow in and out of the lungs, which makes it hard to breathe. The airways also can get clogged with thick mucus. Cigarette smoking is a major cause of COPD. Although there is no cure for COPD, you can slow its progress. Following your treatment plan and taking care of yourself can help you feel better and live longer. Follow-up care is a key part of your treatment and safety. Be sure to make and go to all appointments, and call your doctor if you are having problems. It's also a good idea to know your test results and keep a list of the medicines you take. How can you care for yourself at home?   Staying healthy    · Do not smoke. This is the most important step you can take to prevent more damage to your lungs. If you need help quitting, talk to your doctor about stop-smoking programs and medicines. These can increase your chances of quitting for good.     · Avoid colds and flu. Get a pneumococcal vaccine shot. If you have had one before, ask your doctor whether you need a second dose. Get the flu vaccine every fall.  If you must be around people with colds or the flu, wash your hands often.     · Avoid secondhand smoke, air pollution, and high altitudes. Also avoid cold, dry air and hot, humid air. Stay at home with your windows closed when air pollution is bad.    Medicines and oxygen therapy    · Take your medicines exactly as prescribed. Call your doctor if you think you are having a problem with your medicine.     · You may be taking medicines such as:  ? Bronchodilators. These help open your airways and make breathing easier. Bronchodilators are either short-acting (work for 6 to 9 hours) or long-acting (work for 24 hours). You inhale most bronchodilators, so they start to act quickly. Always carry your quick-relief inhaler with you in case you need it while you are away from home. ? Corticosteroids (prednisone, budesonide). These reduce airway inflammation. They come in pill or inhaled form. You must take these medicines every day for them to work well.     · A spacer may help you get more inhaled medicine to your lungs. Ask your doctor or pharmacist if a spacer is right for you. If it is, ask how to use it properly.     · Do not take any vitamins, over-the-counter medicine, or herbal products without talking to your doctor first.     · If your doctor prescribed antibiotics, take them as directed. Do not stop taking them just because you feel better. You need to take the full course of antibiotics.     · Oxygen therapy boosts the amount of oxygen in your blood and helps you breathe easier. Use the flow rate your doctor has recommended, and do not change it without talking to your doctor first.   Activity    · Get regular exercise. Walking is an easy way to get exercise. Start out slowly, and walk a little more each day.     · Pay attention to your breathing.  You are exercising too hard if you cannot talk while you are exercising.     · Take short rest breaks when doing household chores and other activities.     · Learn breathing [employer] as breathing through pursed lips--to help you become less short of breath.     · If your doctor has not set you up with a pulmonary rehabilitation program, talk to him or her about whether rehab is right for you. Rehab includes exercise programs, education about your disease and how to manage it, help with diet and other changes, and emotional support. Diet    · Eat regular, healthy meals. Use bronchodilators about 1 hour before you eat to make it easier to eat. Eat several small meals instead of three large ones. Drink beverages at the end of the meal. Avoid foods that are hard to chew.     · Eat foods that contain protein so that you do not lose muscle mass.     · Talk with your doctor if you gain too much weight or if you lose weight without trying.    Mental health    · Talk to your family, friends, or a therapist about your feelings. It is normal to feel frightened, angry, hopeless, helpless, and even guilty. Talking openly about bad feelings can help you cope. If these feelings last, talk to your doctor. When should you call for help? Call 911 anytime you think you may need emergency care. For example, call if:    · You have severe trouble breathing.    Call your doctor now or seek immediate medical care if:    · You have new or worse trouble breathing.     · You cough up blood.     · You have a fever.    Watch closely for changes in your health, and be sure to contact your doctor if:    · You cough more deeply or more often, especially if you notice more mucus or a change in the color of your mucus.     · You have new or worse swelling in your legs or belly.     · You are not getting better as expected. Where can you learn more? Go to http://etelvina-olivier.info/  Enter E072 in the search box to learn more about \"Chronic Obstructive Pulmonary Disease (COPD): Care Instructions. \"  Current as of: June 9, 2019Content Version: 12.4  © 8441-0117 [employer].   Care instructions adapted under license by [employer] (which disclaims liability or warranty for this information). If you have questions about a medical condition or this instruction, always ask your healthcare professional. [address] any warranty or liability for your use of this information. Patient Education        COPD Exacerbation Plan: Care Instructions  Your Care Instructions    If you have chronic obstructive pulmonary disease (COPD), your usual shortness of breath could suddenly get worse. You may start coughing more and have more mucus. This flare-up is called a COPD exacerbation (say \"[id number]-[id number]\"). A lung infection or air pollution could set off an exacerbation. Sometimes it can happen after a quick change in temperature or being around chemicals. Work with your doctor to make a plan for dealing with an exacerbation. You can better manage it if you plan ahead. Follow-up care is a key part of your treatment and safety. Be sure to make and go to all appointments, and call your doctor if you are having problems. It's also a good idea to know your test results and keep a list of the medicines you take. How can you care for yourself at home? During an exacerbation  · Do not panic if you start to have one. Quick treatment at home may help you prevent serious breathing problems. If you have a COPD exacerbation plan that you developed with your doctor, follow it. · Take your medicines exactly as your doctor tells you.  ? Use your inhaler as directed by your doctor. If your symptoms do not get better after you use your medicine, have someone take you to the emergency room. Call an ambulance if necessary. ? With inhaled medicines, a spacer or a nebulizer may help you get more medicine to your lungs. Ask your doctor or pharmacist how to use them properly. Practice using the spacer in front of a mirror before you have an exacerbation.  This may help you get the medicine into your lungs quickly. ? If your doctor has given you steroid pills, take them as directed. ? Your doctor may have given you a prescription for antibiotics, which you can fill if you need it. ? Talk to your doctor if you have any problems with your medicine. And call your doctor if you have to use your antibiotic or steroid pills. Preventing an exacerbation  · Do not smoke. This is the most important step you can take to prevent more damage to your lungs and prevent problems. If you already smoke, it is never too late to stop. If you need help quitting, talk to your doctor about stop-smoking programs and medicines. These can increase your chances of quitting for good. · Take your daily medicines as prescribed. · Avoid colds and flu. ? Get a pneumococcal vaccine. ? Get a flu vaccine each year, as soon as it is available. Ask those you live or work with to do the same, so they will not get the flu and infect you. ? Try to stay away from people with colds or the flu. ? Wash your hands often. · Avoid secondhand smoke; air pollution; cold, dry air; hot, humid air; and high altitudes. Stay at home with your windows closed when air pollution is bad. · Learn breathing techniques for COPD, such as breathing through pursed lips. These techniques can help you breathe easier during an exacerbation. When should you call for help? Call 911 anytime you think you may need emergency care.  For example, call if:    · You have severe trouble breathing.     · You have severe chest pain.    Call your doctor now or seek immediate medical care if:    · You have new or worse shortness of breath.     · You develop new chest pain.     · You are coughing more deeply or more often, especially if you notice more mucus or a change in the color of your mucus.     · You cough up blood.     · You have new or increased swelling in your legs or belly.     · You have a fever.    Watch closely for changes in your health, and be sure to contact your doctor if:    · You need to use your antibiotic or steroid pills.     · Your symptoms are getting worse. Where can you learn more? Go to http://etelvina-olivier.info/  Enter U536 in the search box to learn more about \"COPD Exacerbation Plan: Care Instructions. \"  Current as of: June 9, 2019Content Version: 12.4  © 0809-0614 [employer]. Care instructions adapted under license by [employer] (which disclaims liability or warranty for this information). If you have questions about a medical condition or this instruction, always ask your healthcare professional. [place] [address] any warranty or liability for your use of this information. Your A1C  was   Lab Results   Component Value Date/Time    Hemoglobin A1c 8.2 (H) 03/05/2020 05:41 AM   .  This lab test reflects that your blood sugar averaged 189 mg/dL over the past 3 months. It is important to follow up with your provider on a routine basis to continue to evaluate your blood sugar discuss any necessary changes in treatment.

## 2020-03-25 NOTE — PROGRESS NOTES
1310 - After visit summary and discharge instructions reviewed with patient. Discharge instructions included new/changed medications, CHF, COPD and angina home management. Patient verbalized understanding of all materials. Discharge form signed and placed in patients chart. Daughter will arrive for pick-up around 1430.

## 2020-03-26 ENCOUNTER — PATIENT OUTREACH (OUTPATIENT)
Dept: CASE MANAGEMENT | Age: 60
End: 2020-03-26

## 2020-03-27 NOTE — DISCHARGE SUMMARY
[address] [facility]  Discharge Summary    Patient: Namita Pickett MRN: [id number]  CSN: [id number]    YOB: 1960  Age: 60 y.o. Sex: female      Admission Date: 3/23/2020 Discharge Date: 3/25/20   Attending: Stepan Melendez MD PCP: Dion Winkler MD     ===================================================================    Reason for Admission: Acute exacerbation of chronic obstructive pulmonary disease (COPD) ([facility].) [J44.1]  COPD exacerbation ([facility].) [J44.1]    Discharge Diagnoses:   COPD exacerbation    Important notes to PCP/ follow-up studies and evaluations   none    Pending labs and studies:  none    Operative Procedures:   none    Discharge Medications:     Discharge Medication List as of 3/25/2020 12:47 PM      CONTINUE these medications which have CHANGED    Details   ! ! predniSONE (DELTASONE) 10 mg tablet Take 60 mg by mouth daily for 4 days, THEN 40 mg daily for 4 days, THEN 20 mg daily for 4 days, THEN 10 mg daily for 4 days. Indications: worsening chronic obstructive pulmonary disease, Print, Disp-52 Tab, R-0      !! predniSONE (DELTASONE) 10 mg tablet Take 10 mg by mouth every other day., Print, Disp-30 Tab, R-3       !! - Potential duplicate medications found. Please discuss with provider. CONTINUE these medications which have NOT CHANGED    Details   lactobacillus sp. 50 billion cpu (BIO-K PLUS) 50 billion cell -375 mg cap capsule Take 1 Cap by mouth daily. , Print, Disp-30 Cap, R-0      PROLENSA 0.07 % ophthalmic solution Administer 1 Drop to right eye daily. , Historical Med, ERLINDA      simethicone (GAS-X) 125 mg capsule Take 125 mg by mouth four (4) times daily as needed for Flatulence., Historical Med      loratadine (CLARITIN) 10 mg tablet Take 10 mg by mouth daily as needed for Allergies. , Historical Med      lisinopril (PRINIVIL, ZESTRIL) 20 mg tablet Take 20 mg by mouth two (2) times a day., Historical Med      albuterol sulfate (PROVENTIL;VENTOLIN) 2.5 mg/0.5 mL nebu nebulizer solution 0.5 mL by Nebulization route four (4) times daily as needed for Wheezing. To be used with HyperSal nebulizer solution. ICD code: COPD J44.1, Print, Disp-60 mL, R-3      fluticasone propionate (FLONASE ALLERGY RELIEF) 50 mcg/actuation nasal spray 2 Sprays by Both Nostrils route daily. , Historical Med      fluticasone propion-salmeterol (ADVAIR HFA) 230-21 mcg/actuation inhaler Take 2 Puffs by inhalation two (2) times a day., Historical Med      hydroCHLOROthiazide (HYDRODIURIL) 12.5 mg tablet Take 12.5 mg by mouth daily. , Historical Med      tiotropium bromide (SPIRIVA RESPIMAT) 2.5 mcg/actuation inhaler Take 2 Puffs by inhalation daily. , Historical Med      insulin glargine (LANTUS,BASAGLAR) 100 unit/mL (3 mL) inpn 35 Units by SubCUTAneous route nightly., Normal, Disp-28 Units, R-0      albuterol sulfate 90 mcg/actuation aepb Take 1 Puff by inhalation every four (4) hours. , Print, Disp-1 Inhaler, R-0      NOVOLOG FLEXPEN U-100 INSULIN 100 unit/mL inpn Continue home Sliding scale insulin as prior to admission, Print, Disp-1 Pen, R-0, ERLINDA      omeprazole (PRILOSEC) 40 mg capsule Take 40 mg by mouth daily. Indications: gastroesophageal reflux disease, Historical Med      OXYGEN-AIR DELIVERY SYSTEMS 2 L by Nasal route continuous. First Choice, Historical Med      aspirin delayed-release 81 mg tablet Take 81 mg by mouth daily. , Historical Med      montelukast (SINGULAIR) 10 mg tablet Take 10 mg by mouth nightly., Historical Med         STOP taking these medications       amoxicillin-clavulanate (AUGMENTIN) 875-125 mg per tablet Comments:   Reason for Stopping:         famotidine (PEPCID) 20 mg tablet Comments:   Reason for Stopping:               Disposition: Home    Consultants:    none    Brief Hospital Course (including pertinent history and physical findings)  João De Dios is a 60 y.o. female with PMH of COPD on home O2, IDDM2, HTN, HFpEF, SHERRIE on CPAP, GERD, allergic rhinitis, now admitted for COPD exacerbation.      COPD exacerbation   Likely 2/2 viral infx vs seasonal allergies. Has hx of moderate persistent asthma/COPD on 2L home O2 and sleeps with trelegy. Followed by Dr. Pj Duncan in OP. Patient feeling better today at time of discharge on home oxygen requirement  Given a prednisone taper at time of discharge.   -Continue home Singulair and loratadine  -Continue Home advair       SHERRIE/pulm htn  Patient is morbidly obese which may contribute to her respiratory symptoms above and certainly contributes to her diabetes, HTN, SHERRIE, and GERD  Intolerant to Cpap. On trelegy nightly.     Insuline dependent Type 2 Diabetes  Home lantus 35u and novolog SSI. BS 320 on admission. Pt will be NPO while on BIPAP but also starting steroids and pt has been poorly controlled outpatient. Will continue 35 units daily as an outpatient     Hypertension, HFpEF  Stable. Echo 7/7/18 EF 66-70%, LV mild concentric hypertrophy, No left regional wall motion abnorm. SBP in ED elevated 180s.  -continue home lisinopril 20mg bid, HCTZ 12.5mg daily     GERD  Takes omeprazole 40mg qD and pepcid for breakthrough symptoms.   -continue home regimen      Allergic Rhinitis  -home flonase, singulair 10mg     CURRENT ADMISSION IMAGING RESULTS   Xr Chest Port    Result Date: 3/23/2020  IMPRESSION: No acute abnormalities.               Cardiology Procedures/Testing:  MODALITY RESULTS   EKG Results for orders placed or performed during the hospital encounter of 03/13/20   EKG, 12 LEAD, INITIAL   Result Value Ref Range    Ventricular Rate 85 BPM    Atrial Rate 85 BPM    P-R Interval 136 ms    QRS Duration 86 ms    Q-T Interval 384 ms    QTC Calculation (Bezet) 456 ms    Calculated P Axis 40 degrees    Calculated R Axis 32 degrees    Calculated T Axis 55 degrees    Diagnosis       Normal sinus rhythm  Normal ECG  When compared with ECG of 04-MAR-2020 18:10,  No significant change was found  Confirmed by Blank Carter [id number]) on 3/14/2020 11:23:46 PM         ECHO 07/06/18   ECHO ADULT COMPLETE 07/07/2018 7/7/2018    Narrative · Definity contrast was given to enhance imaging. · Left ventricular hyperdynamic systolic function. Estimated left   ventricular ejection fraction is 66 - 70%. Left ventricular mild   concentric hypertrophy observed. Normal left ventricular wall motion, no   regional wall motion abnormality noted. Age-appropriate left ventricular   diastolic function. · Right ventricle not well visualized. · Aortic valve is probably trileaflet. Signed by: Juana Sunshine MD      Nuclear Medicine Results from [facility] [place] encounter on 09/09/13   NM LUNG PERFUSION W VENT    Impression IMPRESSION:    Low probability of pulmonary embolism. Results from [place] encounter on 09/04/13   TRANSCRIBED NUCLEAR MEDICINE   Results from Hospital Encounter encounter on 12/06/12   NM LUNG VENT/PERF    Impression IMPRESSION:    Very low probability for pulmonary embolus. IR No results found for this or any previous visit. CATH       Special Testing/Procedures:  MODALITY RESULTS   MICRO All Micro Results     None         ABG Lab Results   Component Value Date/Time    pH (POC) 7.358 03/23/2020 10:28 PM    pCO2 (POC) 51.4 (H) 03/23/2020 10:28 PM    pO2 (POC) 150 (H) 03/23/2020 10:28 PM    HCO3 (POC) 28.9 (H) 03/23/2020 10:28 PM    FIO2 (POC) 35 03/23/2020 10:28 PM      UA No results found for this or any previous visit.      Laboratory Results:  LABORATORY RESULTS   HEMATOLOGY Lab Results   Component Value Date/Time    WBC 5.4 03/25/2020 05:15 AM    HGB 12.8 03/25/2020 05:15 AM    HCT 37.5 03/25/2020 05:15 AM    PLATELET 566 22/82/4880 05:15 AM    MCV 86.6 03/25/2020 05:15 AM       CHEMISTRIES Lab Results   Component Value Date/Time    Sodium 137 03/25/2020 05:15 AM    Potassium 4.6 03/25/2020 05:15 AM    Chloride 103 03/25/2020 05:15 AM    CO2 27 03/25/2020 05:15 AM    Anion gap 7 03/25/2020 05:15 AM Glucose 310 (H) 03/25/2020 05:15 AM    BUN 11 03/25/2020 05:15 AM    Creatinine 0.95 03/25/2020 05:15 AM    BUN/Creatinine ratio 12 03/25/2020 05:15 AM    GFR est AA >60 03/25/2020 05:15 AM    GFR est non-AA >60 03/25/2020 05:15 AM    Calcium 9.2 03/25/2020 05:15 AM      HEPATIC FUNCTION Lab Results   Component Value Date/Time    Albumin 3.4 03/23/2020 03:13 PM    Bilirubin, direct <0.1 02/08/2017 10:00 PM    Bilirubin, total 0.5 03/23/2020 03:13 PM    Protein, total 7.3 03/23/2020 03:13 PM    Globulin 3.9 03/23/2020 03:13 PM    A-G Ratio 0.9 03/23/2020 03:13 PM    AST (SGOT) 30 03/23/2020 03:13 PM    ALT (SGPT) 67 (H) 03/23/2020 03:13 PM    Alk.  phosphatase 83 03/23/2020 03:13 PM       LACTIC ACID Lab Results   Component Value Date/Time    Lactic acid 2.0 09/04/2019 06:10 AM    Lactic acid 3.1 (HH) 09/03/2019 09:39 PM    Lactic acid 3.1 (HH) 09/03/2019 04:56 PM      CARDIAC PANEL Lab Results   Component Value Date/Time    CK 90 09/20/2019 06:40 PM    CK - MB 1.3 09/20/2019 06:40 PM    CK-MB Index 1.4 09/20/2019 06:40 PM    Troponin-I, QT <0.02 03/23/2020 03:13 PM      NT-proBNP Lab Results   Component Value Date/Time    NT pro-BNP 35 03/23/2020 03:13 PM    NT pro- 03/04/2020 06:15 PM    NT pro-BNP 48 02/29/2020 05:16 PM    NT pro- 01/13/2020 07:15 PM    NT pro-BNP 82 11/06/2019 12:02 AM      THYROID Lab Results   Component Value Date/Time    TSH 2.76 09/18/2016 02:20 PM      LIPID PANEL Lab Results   Component Value Date/Time    Cholesterol, total 220 (H) 11/20/2012 03:22 AM    HDL Cholesterol 62 (H) 11/20/2012 03:22 AM    LDL, calculated 144.6 (H) 11/20/2012 03:22 AM    VLDL, calculated 13.4 11/20/2012 03:22 AM    Triglyceride 67 11/20/2012 03:22 AM    CHOL/HDL Ratio 3.5 11/20/2012 03:22 AM         RISK CALCULATORS:  SCORE RESULT   ASCVD The ASCVD Risk score (Stephanie Dean, et al., 2013) failed to calculate for the following reasons:    ASCVD risk score not calculated    [id number]     HAS-BLED READMISSION RISK SCORE High Risk            23       Total Score        3 Has Seen PCP in Last 6 Months (Yes=3, No=0)    11 IP Visits Last 12 Months (1-3=4, 4=9, >4=11)    9 Pt. Coverage (Medicare=5 , Medicaid, or Self-Pay=4)        Criteria that do not apply:    . Living with Significant Other. Assisted Living. LTAC. SNF.  or   Rehab    Patient Length of Stay (>5 days = 3)    Charlson Comorbidity Score (Age + Comorbid Conditions)           Functional status and cognitive function:    Ambulates with walker  Status: alert, cooperative, no distress, appears stated age  Condition: STABLE  Disposition: home    Diet: diabetic    Code status and advanced care plan: Full    Enid Yu, daughter, [phone]    Patient Education:  Patient was educated on the following topics prior to discharge: COPD, heart failure,angina    Follow-up:   Follow-up Information     Follow up With Specialties Details Why Contact Info    Victoria Hernadez MD [facility] Go on 3/27/2020 Please go to your appointment at 1:40 PM [address]      Eun Austin MD Pulmonary Disease, Internal Medicine Go on 4/6/2020 Appointment time is at 10:45AM, for hospital follow up for COPD exacerbation [address] [place]            Wilfrido Jimenez MD, PGY-1   [address]   Intern Pager: [phone]   March 27, 2020, 8:49 AM

## 2020-03-30 ENCOUNTER — APPOINTMENT (OUTPATIENT)
Dept: GENERAL RADIOLOGY | Age: 60
DRG: 189 | End: 2020-03-30
Attending: STUDENT IN AN ORGANIZED HEALTH CARE EDUCATION/TRAINING PROGRAM
Payer: MEDICARE

## 2020-03-30 ENCOUNTER — HOSPITAL ENCOUNTER (INPATIENT)
Age: 60
LOS: 3 days | Discharge: HOME | DRG: 189 | End: 2020-04-02
Attending: EMERGENCY MEDICINE | Admitting: FAMILY MEDICINE
Payer: MEDICARE

## 2020-03-30 DIAGNOSIS — J44.1 ACUTE EXACERBATION OF CHRONIC OBSTRUCTIVE PULMONARY DISEASE (COPD) (HCC): Primary | ICD-10-CM

## 2020-03-30 DIAGNOSIS — J44.1 COPD EXACERBATION (HCC): ICD-10-CM

## 2020-03-30 LAB
ALBUMIN SERPL-MCNC: 3.8 G/DL (ref 3.4–5)
ALBUMIN/GLOB SERPL: 1 {RATIO} (ref 0.8–1.7)
ALP SERPL-CCNC: 90 U/L (ref 45–117)
ALT SERPL-CCNC: 64 U/L (ref 13–56)
ANION GAP SERPL CALC-SCNC: 9 MMOL/L (ref 3–18)
APPEARANCE UR: CLEAR
ARTERIAL PATENCY WRIST A: YES
AST SERPL-CCNC: 32 U/L (ref 10–38)
BACTERIA URNS QL MICRO: ABNORMAL /HPF
BASE EXCESS BLD CALC-SCNC: 1 MMOL/L
BASOPHILS # BLD: 0 K/UL (ref 0–0.1)
BASOPHILS NFR BLD: 0 % (ref 0–2)
BDY SITE: ABNORMAL
BILIRUB SERPL-MCNC: 0.5 MG/DL (ref 0.2–1)
BILIRUB UR QL: NEGATIVE
BNP SERPL-MCNC: 198 PG/ML (ref 0–900)
BODY TEMPERATURE: 97.4
BUN SERPL-MCNC: 16 MG/DL (ref 7–18)
BUN/CREAT SERPL: 17 (ref 12–20)
CALCIUM SERPL-MCNC: 9.6 MG/DL (ref 8.5–10.1)
CHLORIDE SERPL-SCNC: 104 MMOL/L (ref 100–111)
CO2 SERPL-SCNC: 26 MMOL/L (ref 21–32)
COLOR UR: YELLOW
CREAT SERPL-MCNC: 0.94 MG/DL (ref 0.6–1.3)
D DIMER PPP FEU-MCNC: 0.4 UG/ML(FEU)
DIFFERENTIAL METHOD BLD: ABNORMAL
EOSINOPHIL # BLD: 0.1 K/UL (ref 0–0.4)
EOSINOPHIL NFR BLD: 1 % (ref 0–5)
EPITH CASTS URNS QL MICRO: ABNORMAL /LPF (ref 0–5)
ERYTHROCYTE [DISTWIDTH] IN BLOOD BY AUTOMATED COUNT: 13.8 % (ref 11.6–14.5)
GAS FLOW.O2 O2 DELIVERY SYS: ABNORMAL L/MIN
GAS FLOW.O2 SETTING OXYMISER: 10 L/M
GLOBULIN SER CALC-MCNC: 3.8 G/DL (ref 2–4)
GLUCOSE BLD STRIP.AUTO-MCNC: 275 MG/DL (ref 70–110)
GLUCOSE SERPL-MCNC: 320 MG/DL (ref 74–99)
GLUCOSE UR STRIP.AUTO-MCNC: >1000 MG/DL
HCO3 BLD-SCNC: 27.4 MMOL/L (ref 22–26)
HCT VFR BLD AUTO: 37.8 % (ref 35–45)
HGB BLD-MCNC: 13 G/DL (ref 12–16)
HGB UR QL STRIP: NEGATIVE
KETONES UR QL STRIP.AUTO: ABNORMAL MG/DL
LEUKOCYTE ESTERASE UR QL STRIP.AUTO: NEGATIVE
LYMPHOCYTES # BLD: 0.8 K/UL (ref 0.9–3.6)
LYMPHOCYTES NFR BLD: 11 % (ref 21–52)
MCH RBC QN AUTO: 29.8 PG (ref 24–34)
MCHC RBC AUTO-ENTMCNC: 34.4 G/DL (ref 31–37)
MCV RBC AUTO: 86.7 FL (ref 74–97)
MONOCYTES # BLD: 0.3 K/UL (ref 0.05–1.2)
MONOCYTES NFR BLD: 4 % (ref 3–10)
NEUTS SEG # BLD: 6.2 K/UL (ref 1.8–8)
NEUTS SEG NFR BLD: 84 % (ref 40–73)
NITRITE UR QL STRIP.AUTO: NEGATIVE
O2/TOTAL GAS SETTING VFR VENT: 50 %
PCO2 BLD: 47.7 MMHG (ref 35–45)
PH BLD: 7.36 [PH] (ref 7.35–7.45)
PH UR STRIP: 5 [PH] (ref 5–8)
PLATELET # BLD AUTO: 325 K/UL (ref 135–420)
PMV BLD AUTO: 9.2 FL (ref 9.2–11.8)
PO2 BLD: 241 MMHG (ref 80–100)
POTASSIUM SERPL-SCNC: 4.3 MMOL/L (ref 3.5–5.5)
PROT SERPL-MCNC: 7.6 G/DL (ref 6.4–8.2)
PROT UR STRIP-MCNC: ABNORMAL MG/DL
RBC # BLD AUTO: 4.36 M/UL (ref 4.2–5.3)
RBC #/AREA URNS HPF: ABNORMAL /HPF (ref 0–5)
SAO2 % BLD: 100 % (ref 92–97)
SERVICE CMNT-IMP: ABNORMAL
SODIUM SERPL-SCNC: 139 MMOL/L (ref 136–145)
SP GR UR REFRACTOMETRY: 1.02 (ref 1–1.03)
SPECIMEN TYPE: ABNORMAL
TOTAL RESP. RATE, ITRR: 22
TROPONIN I SERPL-MCNC: <0.02 NG/ML (ref 0–0.04)
UROBILINOGEN UR QL STRIP.AUTO: 0.2 EU/DL (ref 0.2–1)
WBC # BLD AUTO: 7.4 K/UL (ref 4.6–13.2)
WBC URNS QL MICRO: ABNORMAL /HPF (ref 0–4)

## 2020-03-30 PROCEDURE — 5A09457 ASSISTANCE WITH RESPIRATORY VENTILATION, 24-96 CONSECUTIVE HOURS, CONTINUOUS POSITIVE AIRWAY PRESSURE: ICD-10-PCS | Performed by: STUDENT IN AN ORGANIZED HEALTH CARE EDUCATION/TRAINING PROGRAM

## 2020-03-30 PROCEDURE — 74011250636 HC RX REV CODE- 250/636: Performed by: STUDENT IN AN ORGANIZED HEALTH CARE EDUCATION/TRAINING PROGRAM

## 2020-03-30 PROCEDURE — 74011250637 HC RX REV CODE- 250/637: Performed by: STUDENT IN AN ORGANIZED HEALTH CARE EDUCATION/TRAINING PROGRAM

## 2020-03-30 PROCEDURE — 99285 EMERGENCY DEPT VISIT HI MDM: CPT

## 2020-03-30 PROCEDURE — 94640 AIRWAY INHALATION TREATMENT: CPT

## 2020-03-30 PROCEDURE — 85379 FIBRIN DEGRADATION QUANT: CPT

## 2020-03-30 PROCEDURE — 81001 URINALYSIS AUTO W/SCOPE: CPT

## 2020-03-30 PROCEDURE — 83880 ASSAY OF NATRIURETIC PEPTIDE: CPT

## 2020-03-30 PROCEDURE — 82803 BLOOD GASES ANY COMBINATION: CPT

## 2020-03-30 PROCEDURE — 74011636637 HC RX REV CODE- 636/637: Performed by: STUDENT IN AN ORGANIZED HEALTH CARE EDUCATION/TRAINING PROGRAM

## 2020-03-30 PROCEDURE — 77010033678 HC OXYGEN DAILY

## 2020-03-30 PROCEDURE — 94762 N-INVAS EAR/PLS OXIMTRY CONT: CPT

## 2020-03-30 PROCEDURE — 74011000258 HC RX REV CODE- 258: Performed by: STUDENT IN AN ORGANIZED HEALTH CARE EDUCATION/TRAINING PROGRAM

## 2020-03-30 PROCEDURE — 82962 GLUCOSE BLOOD TEST: CPT

## 2020-03-30 PROCEDURE — 96375 TX/PRO/DX INJ NEW DRUG ADDON: CPT

## 2020-03-30 PROCEDURE — 80053 COMPREHEN METABOLIC PANEL: CPT

## 2020-03-30 PROCEDURE — 96365 THER/PROPH/DIAG IV INF INIT: CPT

## 2020-03-30 PROCEDURE — 36600 WITHDRAWAL OF ARTERIAL BLOOD: CPT

## 2020-03-30 PROCEDURE — 84484 ASSAY OF TROPONIN QUANT: CPT

## 2020-03-30 PROCEDURE — 93005 ELECTROCARDIOGRAM TRACING: CPT

## 2020-03-30 PROCEDURE — 74011000250 HC RX REV CODE- 250: Performed by: STUDENT IN AN ORGANIZED HEALTH CARE EDUCATION/TRAINING PROGRAM

## 2020-03-30 PROCEDURE — 65660000004 HC RM CVT STEPDOWN

## 2020-03-30 PROCEDURE — 85025 COMPLETE CBC W/AUTO DIFF WBC: CPT

## 2020-03-30 PROCEDURE — 71045 X-RAY EXAM CHEST 1 VIEW: CPT

## 2020-03-30 RX ORDER — MAGNESIUM SULFATE HEPTAHYDRATE 40 MG/ML
2 INJECTION, SOLUTION INTRAVENOUS
Status: COMPLETED | OUTPATIENT
Start: 2020-03-30 | End: 2020-03-30

## 2020-03-30 RX ORDER — DEXTROSE 50 % IN WATER (D50W) INTRAVENOUS SYRINGE
25-50 AS NEEDED
Status: DISCONTINUED | OUTPATIENT
Start: 2020-03-30 | End: 2020-04-02 | Stop reason: HOSPADM

## 2020-03-30 RX ORDER — FUROSEMIDE 10 MG/ML
20 INJECTION INTRAMUSCULAR; INTRAVENOUS
Status: COMPLETED | OUTPATIENT
Start: 2020-03-30 | End: 2020-03-30

## 2020-03-30 RX ORDER — PANTOPRAZOLE SODIUM 40 MG/1
40 TABLET, DELAYED RELEASE ORAL
Status: DISCONTINUED | OUTPATIENT
Start: 2020-03-31 | End: 2020-04-02 | Stop reason: HOSPADM

## 2020-03-30 RX ORDER — ARFORMOTEROL TARTRATE 15 UG/2ML
15 SOLUTION RESPIRATORY (INHALATION)
Status: DISCONTINUED | OUTPATIENT
Start: 2020-03-30 | End: 2020-04-02 | Stop reason: HOSPADM

## 2020-03-30 RX ORDER — DEXAMETHASONE SODIUM PHOSPHATE 4 MG/ML
10 INJECTION, SOLUTION INTRA-ARTICULAR; INTRALESIONAL; INTRAMUSCULAR; INTRAVENOUS; SOFT TISSUE
Status: DISCONTINUED | OUTPATIENT
Start: 2020-03-30 | End: 2020-03-30

## 2020-03-30 RX ORDER — IPRATROPIUM BROMIDE AND ALBUTEROL SULFATE 2.5; .5 MG/3ML; MG/3ML
3 SOLUTION RESPIRATORY (INHALATION)
Status: DISCONTINUED | OUTPATIENT
Start: 2020-03-30 | End: 2020-04-02 | Stop reason: HOSPADM

## 2020-03-30 RX ORDER — ACETAMINOPHEN 325 MG/1
650 TABLET ORAL
Status: DISCONTINUED | OUTPATIENT
Start: 2020-03-30 | End: 2020-04-02 | Stop reason: HOSPADM

## 2020-03-30 RX ORDER — HYDROCHLOROTHIAZIDE 25 MG/1
12.5 TABLET ORAL DAILY
Status: DISCONTINUED | OUTPATIENT
Start: 2020-03-31 | End: 2020-04-02 | Stop reason: HOSPADM

## 2020-03-30 RX ORDER — INSULIN GLARGINE 100 [IU]/ML
25 INJECTION, SOLUTION SUBCUTANEOUS
Status: DISCONTINUED | OUTPATIENT
Start: 2020-03-30 | End: 2020-03-31

## 2020-03-30 RX ORDER — SIMETHICONE 125 MG
125 CAPSULE ORAL
Status: DISCONTINUED | OUTPATIENT
Start: 2020-03-30 | End: 2020-04-02 | Stop reason: HOSPADM

## 2020-03-30 RX ORDER — MONTELUKAST SODIUM 10 MG/1
10 TABLET ORAL
Status: DISCONTINUED | OUTPATIENT
Start: 2020-03-30 | End: 2020-04-02 | Stop reason: HOSPADM

## 2020-03-30 RX ORDER — IPRATROPIUM BROMIDE AND ALBUTEROL SULFATE 2.5; .5 MG/3ML; MG/3ML
3 SOLUTION RESPIRATORY (INHALATION)
Status: DISCONTINUED | OUTPATIENT
Start: 2020-03-30 | End: 2020-03-30

## 2020-03-30 RX ORDER — LEVOFLOXACIN 5 MG/ML
750 INJECTION, SOLUTION INTRAVENOUS EVERY 24 HOURS
Status: DISCONTINUED | OUTPATIENT
Start: 2020-03-30 | End: 2020-03-31

## 2020-03-30 RX ORDER — ASPIRIN 81 MG/1
81 TABLET ORAL DAILY
Status: DISCONTINUED | OUTPATIENT
Start: 2020-03-31 | End: 2020-04-02 | Stop reason: HOSPADM

## 2020-03-30 RX ORDER — FLUTICASONE PROPIONATE 50 MCG
2 SPRAY, SUSPENSION (ML) NASAL DAILY
Status: DISCONTINUED | OUTPATIENT
Start: 2020-03-31 | End: 2020-04-02 | Stop reason: HOSPADM

## 2020-03-30 RX ORDER — LISINOPRIL 20 MG/1
20 TABLET ORAL 2 TIMES DAILY
Status: DISCONTINUED | OUTPATIENT
Start: 2020-03-30 | End: 2020-04-02 | Stop reason: HOSPADM

## 2020-03-30 RX ORDER — IPRATROPIUM BROMIDE 0.5 MG/2.5ML
SOLUTION RESPIRATORY (INHALATION)
Status: DISCONTINUED | OUTPATIENT
Start: 2020-03-31 | End: 2020-03-31

## 2020-03-30 RX ORDER — HEPARIN SODIUM 5000 [USP'U]/ML
5000 INJECTION, SOLUTION INTRAVENOUS; SUBCUTANEOUS EVERY 8 HOURS
Status: DISCONTINUED | OUTPATIENT
Start: 2020-03-30 | End: 2020-04-02 | Stop reason: HOSPADM

## 2020-03-30 RX ORDER — INSULIN GLARGINE 100 [IU]/ML
35 INJECTION, SOLUTION SUBCUTANEOUS
Status: DISCONTINUED | OUTPATIENT
Start: 2020-03-30 | End: 2020-03-30

## 2020-03-30 RX ORDER — IPRATROPIUM BROMIDE AND ALBUTEROL SULFATE 2.5; .5 MG/3ML; MG/3ML
3 SOLUTION RESPIRATORY (INHALATION)
Status: COMPLETED | OUTPATIENT
Start: 2020-03-30 | End: 2020-03-30

## 2020-03-30 RX ORDER — LORATADINE 10 MG/1
10 TABLET ORAL
Status: DISCONTINUED | OUTPATIENT
Start: 2020-03-30 | End: 2020-04-02 | Stop reason: HOSPADM

## 2020-03-30 RX ORDER — MAGNESIUM SULFATE 100 %
16 CRYSTALS MISCELLANEOUS AS NEEDED
Status: DISCONTINUED | OUTPATIENT
Start: 2020-03-30 | End: 2020-04-02 | Stop reason: HOSPADM

## 2020-03-30 RX ORDER — BUDESONIDE 0.5 MG/2ML
1000 INHALANT ORAL
Status: DISCONTINUED | OUTPATIENT
Start: 2020-03-30 | End: 2020-04-02 | Stop reason: HOSPADM

## 2020-03-30 RX ORDER — KETOROLAC TROMETHAMINE 5 MG/ML
SOLUTION OPHTHALMIC 4 TIMES DAILY
Status: DISCONTINUED | OUTPATIENT
Start: 2020-03-31 | End: 2020-04-02 | Stop reason: HOSPADM

## 2020-03-30 RX ORDER — IPRATROPIUM BROMIDE AND ALBUTEROL SULFATE 2.5; .5 MG/3ML; MG/3ML
3 SOLUTION RESPIRATORY (INHALATION)
Status: DISCONTINUED | OUTPATIENT
Start: 2020-03-31 | End: 2020-04-02 | Stop reason: HOSPADM

## 2020-03-30 RX ORDER — DEXTROSE MONOHYDRATE AND SODIUM CHLORIDE 5; .45 G/100ML; G/100ML
125 INJECTION, SOLUTION INTRAVENOUS CONTINUOUS
Status: DISCONTINUED | OUTPATIENT
Start: 2020-03-30 | End: 2020-03-31

## 2020-03-30 RX ORDER — INSULIN LISPRO 100 [IU]/ML
INJECTION, SOLUTION INTRAVENOUS; SUBCUTANEOUS
Status: DISCONTINUED | OUTPATIENT
Start: 2020-03-30 | End: 2020-04-02 | Stop reason: HOSPADM

## 2020-03-30 RX ADMIN — IPRATROPIUM BROMIDE AND ALBUTEROL SULFATE 3 ML: .5; 3 SOLUTION RESPIRATORY (INHALATION) at 19:46

## 2020-03-30 RX ADMIN — LISINOPRIL 20 MG: 20 TABLET ORAL at 21:12

## 2020-03-30 RX ADMIN — INSULIN LISPRO 9 UNITS: 100 INJECTION, SOLUTION INTRAVENOUS; SUBCUTANEOUS at 21:22

## 2020-03-30 RX ADMIN — IPRATROPIUM BROMIDE AND ALBUTEROL SULFATE 3 ML: .5; 3 SOLUTION RESPIRATORY (INHALATION) at 19:45

## 2020-03-30 RX ADMIN — IPRATROPIUM BROMIDE AND ALBUTEROL SULFATE 3 ML: .5; 3 SOLUTION RESPIRATORY (INHALATION) at 21:25

## 2020-03-30 RX ADMIN — IPRATROPIUM BROMIDE AND ALBUTEROL SULFATE 3 ML: .5; 3 SOLUTION RESPIRATORY (INHALATION) at 20:40

## 2020-03-30 RX ADMIN — ARFORMOTEROL TARTRATE 15 MCG: 15 SOLUTION RESPIRATORY (INHALATION) at 21:29

## 2020-03-30 RX ADMIN — FUROSEMIDE 20 MG: 10 INJECTION, SOLUTION INTRAMUSCULAR; INTRAVENOUS at 19:46

## 2020-03-30 RX ADMIN — MAGNESIUM SULFATE HEPTAHYDRATE 2 G: 40 INJECTION, SOLUTION INTRAVENOUS at 19:15

## 2020-03-30 RX ADMIN — METHYLPREDNISOLONE SODIUM SUCCINATE 60 MG: 40 INJECTION, POWDER, FOR SOLUTION INTRAMUSCULAR; INTRAVENOUS at 22:44

## 2020-03-30 RX ADMIN — FUROSEMIDE 20 MG: 10 INJECTION, SOLUTION INTRAMUSCULAR; INTRAVENOUS at 21:09

## 2020-03-30 RX ADMIN — INSULIN GLARGINE 25 UNITS: 100 INJECTION, SOLUTION SUBCUTANEOUS at 22:47

## 2020-03-30 RX ADMIN — MAGNESIUM SULFATE HEPTAHYDRATE 2 G: 40 INJECTION, SOLUTION INTRAVENOUS at 21:13

## 2020-03-30 RX ADMIN — IPRATROPIUM BROMIDE AND ALBUTEROL SULFATE 3 ML: .5; 3 SOLUTION RESPIRATORY (INHALATION) at 21:51

## 2020-03-30 RX ADMIN — BUDESONIDE 1000 MCG: 0.5 INHALANT RESPIRATORY (INHALATION) at 21:29

## 2020-03-30 RX ADMIN — DEXTROSE MONOHYDRATE AND SODIUM CHLORIDE 125 ML/HR: 5; .45 INJECTION, SOLUTION INTRAVENOUS at 22:34

## 2020-03-30 RX ADMIN — HEPARIN SODIUM 5000 UNITS: 5000 INJECTION INTRAVENOUS; SUBCUTANEOUS at 22:47

## 2020-03-30 RX ADMIN — LEVOFLOXACIN 750 MG: 5 INJECTION, SOLUTION INTRAVENOUS at 22:38

## 2020-03-30 NOTE — ED PROVIDER NOTES
EMERGENCY DEPARTMENT HISTORY AND PHYSICAL EXAM    5:43 PM      Date: 3/30/2020  Patient Name: Ramón Chacon    History of Presenting Illness     No chief complaint on file. History Provided By: Patient  Location/Duration/Severity/Modifying factors   Patient is 60-year-old female past medical history of COPD, CHF, DM type II, pulmonary hypertension presenting with 1 day history of shortness of breath. She states she a bad night and began getting worsening shortness of breath approximately 2200 last night. She attempted multiple breathing treatments as well as her at home BiPAP without effect. She denies any fevers or chills, denies URI symptoms. She states she is only been out of the house to see her doctor and to  prescriptions both time she wore mask. She denies any sick contacts. She states she has been coughing but is unable to bring up anything. She states she feels some chest tightness but denies pain she also endorses abdominal swelling and bilateral lower extremity edema. Abdominal swelling is separate from her umbilical hernia which is at baseline. She has had 2 hospitalizations this month for the same.   She admits to 4 pillow orthopnea, sleeps in a chair          PCP: Daphne Beauchamp MD    Current Facility-Administered Medications   Medication Dose Route Frequency Provider Last Rate Last Dose    magnesium sulfate 2 g/50 ml IVPB (premix or compounded)  2 g IntraVENous NOW Héctor Cesar MD 50 mL/hr at 03/30/20 1915 2 g at 03/30/20 1915    dexamethasone (DECADRON) 10 mg in 0.9% sodium chloride 50 mL IVPB  10 mg IntraVENous ONCE Héctor Cesar MD        albuterol-ipratropium (DUO-NEB) 2.5 MG-0.5 MG/3 ML  3 mL Nebulization Q1H Héctor Cesar MD        doxycycline (VIBRAMYCIN) 100 mg in 0.9% sodium chloride (MBP/ADV) 100 mL MBP  100 mg IntraVENous Q12H Héctor Cesar MD        furosemide (LASIX) injection 20 mg  20 mg IntraVENous NOW Héctor Cesar MD Current Outpatient Medications   Medication Sig Dispense Refill    predniSONE (DELTASONE) 10 mg tablet Take 60 mg by mouth daily for 4 days, THEN 40 mg daily for 4 days, THEN 20 mg daily for 4 days, THEN 10 mg daily for 4 days. Indications: worsening chronic obstructive pulmonary disease 52 Tab 0    [START ON 4/11/2020] predniSONE (DELTASONE) 10 mg tablet Take 10 mg by mouth every other day. 30 Tab 3    lactobacillus sp. 50 billion cpu (BIO-K PLUS) 50 billion cell -375 mg cap capsule Take 1 Cap by mouth daily. 30 Cap 0    PROLENSA 0.07 % ophthalmic solution Administer 1 Drop to right eye daily.  simethicone (GAS-X) 125 mg capsule Take 125 mg by mouth four (4) times daily as needed for Flatulence.  loratadine (CLARITIN) 10 mg tablet Take 10 mg by mouth daily as needed for Allergies.  lisinopril (PRINIVIL, ZESTRIL) 20 mg tablet Take 20 mg by mouth two (2) times a day.  albuterol sulfate (PROVENTIL;VENTOLIN) 2.5 mg/0.5 mL nebu nebulizer solution 0.5 mL by Nebulization route four (4) times daily as needed for Wheezing. To be used with HyperSal nebulizer solution. ICD code: COPD J44.1 60 mL 3    fluticasone propionate (FLONASE ALLERGY RELIEF) 50 mcg/actuation nasal spray 2 Sprays by Both Nostrils route daily.  fluticasone propion-salmeterol (ADVAIR HFA) 230-21 mcg/actuation inhaler Take 2 Puffs by inhalation two (2) times a day.  hydroCHLOROthiazide (HYDRODIURIL) 12.5 mg tablet Take 12.5 mg by mouth daily.  tiotropium bromide (SPIRIVA RESPIMAT) 2.5 mcg/actuation inhaler Take 2 Puffs by inhalation daily.  insulin glargine (LANTUS,BASAGLAR) 100 unit/mL (3 mL) inpn 35 Units by SubCUTAneous route nightly. 28 Units 0    albuterol sulfate 90 mcg/actuation aepb Take 1 Puff by inhalation every four (4) hours.  (Patient taking differently: Take 1 Puff by inhalation every four (4) hours as needed.) 1 Inhaler 0    NOVOLOG FLEXPEN U-100 INSULIN 100 unit/mL inpn Continue home Sliding scale insulin as prior to admission (Patient taking differently: 1 Units by SubCUTAneous route three (3) times daily. If BG <100=0u  101-150=5u  151-250=8u  251-300=12u  >300 call MD  ) 1 Pen 0    omeprazole (PRILOSEC) 40 mg capsule Take 40 mg by mouth daily. Indications: gastroesophageal reflux disease      OXYGEN-AIR DELIVERY SYSTEMS 2 L by Nasal route continuous. [facility]      aspirin delayed-release 81 mg tablet Take 81 mg by mouth daily.  montelukast (SINGULAIR) 10 mg tablet Take 10 mg by mouth nightly.          Past History     Past Medical History:  Past Medical History:   Diagnosis Date    Asthma     Chronic lung disease     COPD     Cystocele, midline     Diabetes mellitus ([facility] [address]     GERD (gastroesophageal reflux disease)     Hidradenitis suppurativa     Hyperlipidemia     Hypertension     SHERRIE on CPAP     CPAP    Stress incontinence        Past Surgical History:  Past Surgical History:   Procedure Laterality Date    BREAST SURGERY PROCEDURE UNLISTED      Right breast benign tumor removal    HX APPENDECTOMY      HX CHOLECYSTECTOMY      HX DILATION AND CURETTAGE      Dysfunctional uterine bleeding, thought 2/2 fibroids    HX TUBAL LIGATION         Family History:  Family History   Problem Relation Age of Onset    Hypertension Mother     Stroke Mother     Breast Cancer Mother         Bilateral mastectomies    Cancer Mother         ovarian and breast    Heart Failure Mother     Heart Attack Father         2011    Heart Surgery Father         CABG    Heart Failure Father     COPD Sister         Heavy smoker    Cancer Sister         ovarian    Heart Failure Sister     Lung Disease Sister     Asthma Child     Cancer Maternal Aunt         pancreatic    Cancer Maternal Grandfather         stomach       Social History:  Social History     Tobacco Use    Smoking status: Former Smoker     Packs/day: 1.00     Years: 30.00     Pack years: 30.00     Types: Cigarettes     Start date: 1966     Last attempt to quit: 2006     Years since quittin.5    Smokeless tobacco: Never Used    Tobacco comment: 1-1.5 packs per day   Substance Use Topics    Alcohol use: No    Drug use: No       Allergies: Allergies   Allergen Reactions    Ancef [Cefazolin] Hives    Contrast Agent [Iodine] Anaphylaxis, Shortness of Breath and Swelling     Needs pre-medication for IV contrast with Benadryl, Solu-Medrol    Fish Containing Products Anaphylaxis     Pt states she had a severe allergic reaction at 9 y/o.  Statins-Hmg-Coa Reductase Inhibitors Myalgia    Metformin Other (comments)     Abdominal pain, diarrhea.  Codeine Other (comments)     Altered mental status         Review of Systems       Review of Systems   Constitutional: Negative for chills and fever. HENT: Negative for facial swelling and sore throat. Eyes: Negative for photophobia and pain. Respiratory: Positive for cough, chest tightness, shortness of breath and wheezing. Cardiovascular: Positive for leg swelling. Negative for chest pain and palpitations. Gastrointestinal: Positive for abdominal distention. Negative for abdominal pain, constipation, diarrhea and nausea. Genitourinary: Negative for dysuria and frequency. Musculoskeletal: Negative for arthralgias, myalgias and neck stiffness. Skin: Negative for rash and wound. Neurological: Negative for numbness and headaches. Psychiatric/Behavioral: Negative for agitation and confusion. Physical Exam     Visit Vitals  /77   Pulse 89   Temp 97.2 °F (36.2 °C) Comment: 97.2   Resp 27   SpO2 98%         Physical Exam  Vitals signs and nursing note reviewed. Constitutional:       Appearance: She is ill-appearing. Comments: 2-3 word conversational dyspnea   HENT:      Head: Normocephalic and atraumatic.       Right Ear: Ear canal and external ear normal.      Left Ear: Ear canal and external ear normal.      Nose: Nose normal. Mouth/Throat:      Mouth: Mucous membranes are moist.      Pharynx: No oropharyngeal exudate. Eyes:      Extraocular Movements: Extraocular movements intact. Pupils: Pupils are equal, round, and reactive to light. Neck:      Musculoskeletal: Normal range of motion and neck supple. Cardiovascular:      Rate and Rhythm: Normal rate and regular rhythm. Pulses: Normal pulses. Heart sounds: Normal heart sounds. Pulmonary:      Effort: Respiratory distress present. Breath sounds: Wheezing present. Comments: Diminished breath sounds, mild accessory use, saturating 99% on 9 L nonrebreather, 95% after several minutes removal supplemental oxygen  Chest:      Chest wall: No tenderness. Abdominal:      General: Bowel sounds are normal.      Palpations: Abdomen is soft. Tenderness: There is no abdominal tenderness. There is no guarding or rebound. Comments: Trace pitting edema, umbilical hernia   Musculoskeletal: Normal range of motion. General: No swelling or tenderness. Skin:     General: Skin is warm and dry. Capillary Refill: Capillary refill takes less than 2 seconds. Neurological:      General: No focal deficit present. Mental Status: She is alert. Mental status is at baseline. Psychiatric:         Mood and Affect: Mood normal.         Behavior: Behavior normal.           Diagnostic Study Results     Labs -  Recent Results (from the past 12 hour(s))   CBC WITH AUTOMATED DIFF    Collection Time: 03/30/20  6:02 PM   Result Value Ref Range    WBC 7.4 4.6 - 13.2 K/uL    RBC 4.36 4.20 - 5.30 M/uL    HGB 13.0 12.0 - 16.0 g/dL    HCT 37.8 35.0 - 45.0 %    MCV 86.7 74.0 - 97.0 FL    MCH 29.8 24.0 - 34.0 PG    MCHC 34.4 31.0 - 37.0 g/dL    RDW 13.8 11.6 - 14.5 %    PLATELET 196 737 - 597 K/uL    MPV 9.2 9.2 - 11.8 FL    NEUTROPHILS 84 (H) 40 - 73 %    LYMPHOCYTES 11 (L) 21 - 52 %    MONOCYTES 4 3 - 10 %    EOSINOPHILS 1 0 - 5 %    BASOPHILS 0 0 - 2 %    ABS. NEUTROPHILS 6.2 1.8 - 8.0 K/UL    ABS. LYMPHOCYTES 0.8 (L) 0.9 - 3.6 K/UL    ABS. MONOCYTES 0.3 0.05 - 1.2 K/UL    ABS. EOSINOPHILS 0.1 0.0 - 0.4 K/UL    ABS. BASOPHILS 0.0 0.0 - 0.1 K/UL    DF AUTOMATED     METABOLIC PANEL, COMPREHENSIVE    Collection Time: 03/30/20  6:02 PM   Result Value Ref Range    Sodium 139 136 - 145 mmol/L    Potassium 4.3 3.5 - 5.5 mmol/L    Chloride 104 100 - 111 mmol/L    CO2 26 21 - 32 mmol/L    Anion gap 9 3.0 - 18 mmol/L    Glucose 320 (H) 74 - 99 mg/dL    BUN 16 7.0 - 18 MG/DL    Creatinine 0.94 0.6 - 1.3 MG/DL    BUN/Creatinine ratio 17 12 - 20      GFR est AA >60 >60 ml/min/1.73m2    GFR est non-AA >60 >60 ml/min/1.73m2    Calcium 9.6 8.5 - 10.1 MG/DL    Bilirubin, total 0.5 0.2 - 1.0 MG/DL    ALT (SGPT) 64 (H) 13 - 56 U/L    AST (SGOT) 32 10 - 38 U/L    Alk.  phosphatase 90 45 - 117 U/L    Protein, total 7.6 6.4 - 8.2 g/dL    Albumin 3.8 3.4 - 5.0 g/dL    Globulin 3.8 2.0 - 4.0 g/dL    A-G Ratio 1.0 0.8 - 1.7     NT-PRO BNP    Collection Time: 03/30/20  6:02 PM   Result Value Ref Range    NT pro- 0 - 900 PG/ML   TROPONIN I    Collection Time: 03/30/20  6:02 PM   Result Value Ref Range    Troponin-I, QT <0.02 0.0 - 0.045 NG/ML   URINALYSIS W/ RFLX MICROSCOPIC    Collection Time: 03/30/20  6:02 PM   Result Value Ref Range    Color YELLOW      Appearance CLEAR      Specific gravity 1.025 1.005 - 1.030      pH (UA) 5.0 5.0 - 8.0      Protein TRACE (A) NEG mg/dL    Glucose >1,000 (A) NEG mg/dL    Ketone TRACE (A) NEG mg/dL    Bilirubin NEGATIVE  NEG      Blood NEGATIVE  NEG      Urobilinogen 0.2 0.2 - 1.0 EU/dL    Nitrites NEGATIVE  NEG      Leukocyte Esterase NEGATIVE  NEG     URINE MICROSCOPIC ONLY    Collection Time: 03/30/20  6:02 PM   Result Value Ref Range    WBC 0 to 2 0 - 4 /hpf    RBC NONE 0 - 5 /hpf    Epithelial cells 4+ 0 - 5 /lpf    Bacteria 3+ (A) NEG /hpf   EKG, 12 LEAD, INITIAL    Collection Time: 03/30/20  6:04 PM   Result Value Ref Range    Ventricular Rate 80 BPM    Atrial Rate 80 BPM    P-R Interval 150 ms    QRS Duration 84 ms    Q-T Interval 372 ms    QTC Calculation (Bezet) 429 ms    Calculated P Axis 41 degrees    Calculated R Axis 22 degrees    Calculated T Axis 54 degrees    Diagnosis       Normal sinus rhythm  Normal ECG  When compared with ECG of 13-MAR-2020 22:11,  No significant change was found     POC G3    Collection Time: 03/30/20  6:28 PM   Result Value Ref Range    Device: Non rebreather      Flow rate (POC) 10 L/M    FIO2 (POC) 50 %    pH (POC) 7.364 7.35 - 7.45      pCO2 (POC) 47.7 (H) 35.0 - 45.0 MMHG    pO2 (POC) 241 (H) 80 - 100 MMHG    HCO3 (POC) 27.4 (H) 22 - 26 MMOL/L    sO2 (POC) 100 (H) 92 - 97 %    Base excess (POC) 1 mmol/L    Allens test (POC) YES      Total resp. rate 22      Site RIGHT RADIAL      Patient temp. 97.4      Specimen type (POC) ARTERIAL      Performed by Edilson Ricks        Radiologic Studies -   XR CHEST PORT    (Results Pending)         Medical Decision Making   I am the first provider for this patient. I reviewed the vital signs, available nursing notes, past medical history, past surgical history, family history and social history. Vital Signs-Reviewed the patient's vital signs.       EKG:     Records Reviewed: Nursing Notes, Old Medical Records, Previous Radiology Studies and Previous Laboratory Studies (Time of Review: 5:43 PM)    ED Course: Progress Notes, Reevaluation, and Consults:    ED Course as of Mar 30 1920   Mon Mar 30, 2020   1750 Patient evaluated, call to RT to initiate bipap    [MP]      ED Course User Index  [MP] Herlinda Gallegos MD       Provider Notes (Medical Decision Making):   MDM  Number of Diagnoses or Management Options  Diagnosis management comments: Patient is 74-year-old female with severe end-stage COPD, CHF, possible pulmonary hypertension and diabetes with worsening breathing  Concern for COPD exacerbation, CHF exacerbation, sequela pulmonary hypertension, respiratory infection, viral versus bacterial, PE  Chart review shows EF of 66 to 70% last echo 2018  Cannot find records of documented or treated pulmonary hypertension    ABG shows normal pH and very mildly elevated PCO2  Patient still demonstrating increased work of breathing  Respiratory therapy by room to initiate BiPAP, patient refused wishing to try DuoNebs. Respiratory therapy left phone number for night therapist to initiate BiPAP when she feels it is necessary. No leukocytosis, no anemia, no radiological abnormalities, negative troponin, BNP within normal limits  Possible mild pulmonary edema  Mildly hypervolemic, exam  Patient given doxycycline and Lasix    Awaiting d-dimer    Consulted with [facility] medicine who is very familiar with this patient. They will see her and evaluate for possible admission. Patient turned over to Dr. Rafy Madrigal for further care and final disposition. Procedures    Critical Care Time:       Diagnosis     Clinical Impression: No diagnosis found. Disposition: Admission    Follow-up Information    None          Patient's Medications   Start Taking    No medications on file   Continue Taking    ALBUTEROL SULFATE (PROVENTIL;VENTOLIN) 2.5 MG/0.5 ML NEBU NEBULIZER SOLUTION    0.5 mL by Nebulization route four (4) times daily as needed for Wheezing. To be used with HyperSal nebulizer solution. ICD code: COPD J44.1    ALBUTEROL SULFATE 90 MCG/ACTUATION AEPB    Take 1 Puff by inhalation every four (4) hours. ASPIRIN DELAYED-RELEASE 81 MG TABLET    Take 81 mg by mouth daily. FLUTICASONE PROPION-SALMETEROL (ADVAIR HFA) 230-21 MCG/ACTUATION INHALER    Take 2 Puffs by inhalation two (2) times a day. FLUTICASONE PROPIONATE (FLONASE ALLERGY RELIEF) 50 MCG/ACTUATION NASAL SPRAY    2 Sprays by Both Nostrils route daily. HYDROCHLOROTHIAZIDE (HYDRODIURIL) 12.5 MG TABLET    Take 12.5 mg by mouth daily.     INSULIN GLARGINE (LANTUS,BASAGLAR) 100 UNIT/ML (3 ML) INPN    35 Units by SubCUTAneous route nightly. LACTOBACILLUS SP. 50 BILLION CPU (BIO-K PLUS) 50 BILLION CELL -375 MG CAP CAPSULE    Take 1 Cap by mouth daily. LISINOPRIL (PRINIVIL, ZESTRIL) 20 MG TABLET    Take 20 mg by mouth two (2) times a day. LORATADINE (CLARITIN) 10 MG TABLET    Take 10 mg by mouth daily as needed for Allergies. MONTELUKAST (SINGULAIR) 10 MG TABLET    Take 10 mg by mouth nightly. NOVOLOG FLEXPEN U-100 INSULIN 100 UNIT/ML INPN    Continue home Sliding scale insulin as prior to admission    OMEPRAZOLE (PRILOSEC) 40 MG CAPSULE    Take 40 mg by mouth daily. Indications: gastroesophageal reflux disease    OXYGEN-AIR DELIVERY SYSTEMS    2 L by Nasal route continuous. [employer]    PREDNISONE (DELTASONE) 10 MG TABLET    Take 60 mg by mouth daily for 4 days, THEN 40 mg daily for 4 days, THEN 20 mg daily for 4 days, THEN 10 mg daily for 4 days. Indications: worsening chronic obstructive pulmonary disease    PREDNISONE (DELTASONE) 10 MG TABLET    Take 10 mg by mouth every other day. PROLENSA 0.07 % OPHTHALMIC SOLUTION    Administer 1 Drop to right eye daily. SIMETHICONE (GAS-X) 125 MG CAPSULE    Take 125 mg by mouth four (4) times daily as needed for Flatulence. TIOTROPIUM BROMIDE (SPIRIVA RESPIMAT) 2.5 MCG/ACTUATION INHALER    Take 2 Puffs by inhalation daily. These Medications have changed    No medications on file   Stop Taking    No medications on file     Disclaimer: Sections of this note are dictated using utilizing voice recognition software. Minor typographical errors may be present. If questions arise, please do not hesitate to contact me or call our department.       Jana Officer, [address] [place] [address] EM PGY-1

## 2020-03-30 NOTE — ED NOTES
Pt still presenting with SOB, will call respiratory for hi flow if need after breathing treatment per MD.

## 2020-03-31 LAB
ANION GAP SERPL CALC-SCNC: 9 MMOL/L (ref 3–18)
ATRIAL RATE: 80 BPM
BASOPHILS # BLD: 0 K/UL (ref 0–0.1)
BASOPHILS NFR BLD: 0 % (ref 0–2)
BUN SERPL-MCNC: 18 MG/DL (ref 7–18)
BUN/CREAT SERPL: 17 (ref 12–20)
CALCIUM SERPL-MCNC: 9.3 MG/DL (ref 8.5–10.1)
CALCULATED P AXIS, ECG09: 41 DEGREES
CALCULATED R AXIS, ECG10: 22 DEGREES
CALCULATED T AXIS, ECG11: 54 DEGREES
CHLORIDE SERPL-SCNC: 103 MMOL/L (ref 100–111)
CO2 SERPL-SCNC: 26 MMOL/L (ref 21–32)
CREAT SERPL-MCNC: 1.07 MG/DL (ref 0.6–1.3)
DIAGNOSIS, 93000: NORMAL
DIFFERENTIAL METHOD BLD: ABNORMAL
EOSINOPHIL # BLD: 0 K/UL (ref 0–0.4)
EOSINOPHIL NFR BLD: 0 % (ref 0–5)
ERYTHROCYTE [DISTWIDTH] IN BLOOD BY AUTOMATED COUNT: 13.9 % (ref 11.6–14.5)
GLUCOSE BLD STRIP.AUTO-MCNC: 285 MG/DL (ref 70–110)
GLUCOSE BLD STRIP.AUTO-MCNC: 288 MG/DL (ref 70–110)
GLUCOSE BLD STRIP.AUTO-MCNC: 309 MG/DL (ref 70–110)
GLUCOSE BLD STRIP.AUTO-MCNC: 320 MG/DL (ref 70–110)
GLUCOSE SERPL-MCNC: 322 MG/DL (ref 74–99)
HCT VFR BLD AUTO: 35.1 % (ref 35–45)
HGB BLD-MCNC: 12.6 G/DL (ref 12–16)
LYMPHOCYTES # BLD: 0.9 K/UL (ref 0.9–3.6)
LYMPHOCYTES NFR BLD: 10 % (ref 21–52)
MCH RBC QN AUTO: 31.2 PG (ref 24–34)
MCHC RBC AUTO-ENTMCNC: 35.9 G/DL (ref 31–37)
MCV RBC AUTO: 86.9 FL (ref 74–97)
MONOCYTES # BLD: 0.3 K/UL (ref 0.05–1.2)
MONOCYTES NFR BLD: 3 % (ref 3–10)
NEUTS SEG # BLD: 7.4 K/UL (ref 1.8–8)
NEUTS SEG NFR BLD: 87 % (ref 40–73)
P-R INTERVAL, ECG05: 150 MS
PLATELET # BLD AUTO: 330 K/UL (ref 135–420)
PMV BLD AUTO: 8.9 FL (ref 9.2–11.8)
POTASSIUM SERPL-SCNC: 4.7 MMOL/L (ref 3.5–5.5)
Q-T INTERVAL, ECG07: 372 MS
QRS DURATION, ECG06: 84 MS
QTC CALCULATION (BEZET), ECG08: 429 MS
RBC # BLD AUTO: 4.04 M/UL (ref 4.2–5.3)
SODIUM SERPL-SCNC: 138 MMOL/L (ref 136–145)
VENTRICULAR RATE, ECG03: 80 BPM
WBC # BLD AUTO: 8.6 K/UL (ref 4.6–13.2)

## 2020-03-31 PROCEDURE — 97535 SELF CARE MNGMENT TRAINING: CPT

## 2020-03-31 PROCEDURE — 97165 OT EVAL LOW COMPLEX 30 MIN: CPT

## 2020-03-31 PROCEDURE — 94640 AIRWAY INHALATION TREATMENT: CPT

## 2020-03-31 PROCEDURE — 94660 CPAP INITIATION&MGMT: CPT

## 2020-03-31 PROCEDURE — 97116 GAIT TRAINING THERAPY: CPT

## 2020-03-31 PROCEDURE — 74011000250 HC RX REV CODE- 250: Performed by: STUDENT IN AN ORGANIZED HEALTH CARE EDUCATION/TRAINING PROGRAM

## 2020-03-31 PROCEDURE — 36415 COLL VENOUS BLD VENIPUNCTURE: CPT

## 2020-03-31 PROCEDURE — 85025 COMPLETE CBC W/AUTO DIFF WBC: CPT

## 2020-03-31 PROCEDURE — 80048 BASIC METABOLIC PNL TOTAL CA: CPT

## 2020-03-31 PROCEDURE — 97161 PT EVAL LOW COMPLEX 20 MIN: CPT

## 2020-03-31 PROCEDURE — 74011250636 HC RX REV CODE- 250/636: Performed by: INTERNAL MEDICINE

## 2020-03-31 PROCEDURE — 74011250637 HC RX REV CODE- 250/637: Performed by: STUDENT IN AN ORGANIZED HEALTH CARE EDUCATION/TRAINING PROGRAM

## 2020-03-31 PROCEDURE — 65660000000 HC RM CCU STEPDOWN

## 2020-03-31 PROCEDURE — 74011250636 HC RX REV CODE- 250/636: Performed by: STUDENT IN AN ORGANIZED HEALTH CARE EDUCATION/TRAINING PROGRAM

## 2020-03-31 PROCEDURE — 74011636637 HC RX REV CODE- 636/637: Performed by: STUDENT IN AN ORGANIZED HEALTH CARE EDUCATION/TRAINING PROGRAM

## 2020-03-31 PROCEDURE — 82962 GLUCOSE BLOOD TEST: CPT

## 2020-03-31 RX ORDER — INSULIN GLARGINE 100 [IU]/ML
35 INJECTION, SOLUTION SUBCUTANEOUS
Status: DISCONTINUED | OUTPATIENT
Start: 2020-03-31 | End: 2020-04-01

## 2020-03-31 RX ORDER — ONDANSETRON 4 MG/1
4 TABLET, ORALLY DISINTEGRATING ORAL
Status: DISCONTINUED | OUTPATIENT
Start: 2020-03-31 | End: 2020-04-02 | Stop reason: HOSPADM

## 2020-03-31 RX ADMIN — INSULIN LISPRO 9 UNITS: 100 INJECTION, SOLUTION INTRAVENOUS; SUBCUTANEOUS at 21:22

## 2020-03-31 RX ADMIN — METHYLPREDNISOLONE SODIUM SUCCINATE 60 MG: 40 INJECTION, POWDER, FOR SOLUTION INTRAMUSCULAR; INTRAVENOUS at 10:09

## 2020-03-31 RX ADMIN — METHYLPREDNISOLONE SODIUM SUCCINATE 60 MG: 40 INJECTION, POWDER, FOR SOLUTION INTRAMUSCULAR; INTRAVENOUS at 21:12

## 2020-03-31 RX ADMIN — ONDANSETRON 4 MG: 4 TABLET, ORALLY DISINTEGRATING ORAL at 01:59

## 2020-03-31 RX ADMIN — IPRATROPIUM BROMIDE AND ALBUTEROL SULFATE 3 ML: .5; 3 SOLUTION RESPIRATORY (INHALATION) at 01:52

## 2020-03-31 RX ADMIN — INSULIN LISPRO 9 UNITS: 100 INJECTION, SOLUTION INTRAVENOUS; SUBCUTANEOUS at 13:01

## 2020-03-31 RX ADMIN — HEPARIN SODIUM 5000 UNITS: 5000 INJECTION INTRAVENOUS; SUBCUTANEOUS at 13:01

## 2020-03-31 RX ADMIN — INSULIN LISPRO 12 UNITS: 100 INJECTION, SOLUTION INTRAVENOUS; SUBCUTANEOUS at 08:44

## 2020-03-31 RX ADMIN — BUDESONIDE 1000 MCG: 0.5 INHALANT RESPIRATORY (INHALATION) at 10:16

## 2020-03-31 RX ADMIN — HYDROCHLOROTHIAZIDE 12.5 MG: 25 TABLET ORAL at 10:09

## 2020-03-31 RX ADMIN — FLUTICASONE PROPIONATE 2 SPRAY: 50 SPRAY, METERED NASAL at 10:10

## 2020-03-31 RX ADMIN — MONTELUKAST 10 MG: 10 TABLET, FILM COATED ORAL at 21:11

## 2020-03-31 RX ADMIN — AZITHROMYCIN MONOHYDRATE 500 MG: 500 INJECTION, POWDER, LYOPHILIZED, FOR SOLUTION INTRAVENOUS at 13:02

## 2020-03-31 RX ADMIN — INSULIN GLARGINE 35 UNITS: 100 INJECTION, SOLUTION SUBCUTANEOUS at 22:00

## 2020-03-31 RX ADMIN — IPRATROPIUM BROMIDE AND ALBUTEROL SULFATE 3 ML: .5; 3 SOLUTION RESPIRATORY (INHALATION) at 20:47

## 2020-03-31 RX ADMIN — Medication 1 CAPSULE: at 10:09

## 2020-03-31 RX ADMIN — LISINOPRIL 20 MG: 20 TABLET ORAL at 17:10

## 2020-03-31 RX ADMIN — HEPARIN SODIUM 5000 UNITS: 5000 INJECTION INTRAVENOUS; SUBCUTANEOUS at 21:17

## 2020-03-31 RX ADMIN — IPRATROPIUM BROMIDE AND ALBUTEROL SULFATE 3 ML: .5; 3 SOLUTION RESPIRATORY (INHALATION) at 16:00

## 2020-03-31 RX ADMIN — IPRATROPIUM BROMIDE AND ALBUTEROL SULFATE 3 ML: .5; 3 SOLUTION RESPIRATORY (INHALATION) at 10:16

## 2020-03-31 RX ADMIN — PANTOPRAZOLE SODIUM 40 MG: 40 TABLET, DELAYED RELEASE ORAL at 10:09

## 2020-03-31 RX ADMIN — LISINOPRIL 20 MG: 20 TABLET ORAL at 10:09

## 2020-03-31 RX ADMIN — ARFORMOTEROL TARTRATE 15 MCG: 15 SOLUTION RESPIRATORY (INHALATION) at 10:16

## 2020-03-31 RX ADMIN — ASPIRIN 81 MG: 81 TABLET, COATED ORAL at 10:09

## 2020-03-31 RX ADMIN — HEPARIN SODIUM 5000 UNITS: 5000 INJECTION INTRAVENOUS; SUBCUTANEOUS at 04:27

## 2020-03-31 RX ADMIN — INSULIN LISPRO 12 UNITS: 100 INJECTION, SOLUTION INTRAVENOUS; SUBCUTANEOUS at 17:14

## 2020-03-31 RX ADMIN — BUDESONIDE 1000 MCG: 0.5 INHALANT RESPIRATORY (INHALATION) at 20:48

## 2020-03-31 RX ADMIN — ARFORMOTEROL TARTRATE 15 MCG: 15 SOLUTION RESPIRATORY (INHALATION) at 20:47

## 2020-03-31 RX ADMIN — IPRATROPIUM BROMIDE AND ALBUTEROL SULFATE 3 ML: .5; 3 SOLUTION RESPIRATORY (INHALATION) at 00:00

## 2020-03-31 NOTE — PROGRESS NOTES
Progress Note    Patient: Clay Lara MRN: [id number]  SSN: [id number]    YOB: 1960  Age: 60 y.o. Sex: female      Admit Date: 3/30/2020    LOS: 1 day     Subjective:     Pt on BIPAP when examined at bedside. She would like to eat however informed her that since she is on BIPAP she cannot eat or drink at the moment but will try to wean off BIPAP at some point and then can start diet. Review of Systems   Respiratory: Positive for shortness of breath and wheezing. Negative for cough. Cardiovascular: Negative for chest pain and palpitations. Gastrointestinal: Negative for nausea and vomiting. Objective:     Vitals:    03/31/20 0345 03/31/20 0400 03/31/20 0702 03/31/20 0744   BP: 124/64  (!) 115/91 121/67   Pulse: 79 77 75 75   Resp: 18 19 18 17   Temp: 97.5 °F (36.4 °C)  97.4 °F (36.3 °C) 96.4 °F (35.8 °C)   SpO2: 96% 95% 99% 99%        Intake and Output:  Current Shift: No intake/output data recorded. Last three shifts: No intake/output data recorded. Physical Exam:   General:  AAOx3, On BIPAP this AM  CV:  RRR, no murmurs gallops, or rubs  RESP:  Increased work of breathing. Lungs with diffuse wheezes and poor air movement throughout, no crackles. ABD:  Soft, nontender, nondistended. BS (+). MS:  No joint deformity or instability. No atrophy. Ext:  No edema. 2+ radial and dp pulses bilaterally. Skin:  No rashes, lesions, or ulcers. Good turgor.     Lab/Data Review:  Recent Results (from the past 24 hour(s))   CBC WITH AUTOMATED DIFF    Collection Time: 03/30/20  6:02 PM   Result Value Ref Range    WBC 7.4 4.6 - 13.2 K/uL    RBC 4.36 4.20 - 5.30 M/uL    HGB 13.0 12.0 - 16.0 g/dL    HCT 37.8 35.0 - 45.0 %    MCV 86.7 74.0 - 97.0 FL    MCH 29.8 24.0 - 34.0 PG    MCHC 34.4 31.0 - 37.0 g/dL    RDW 13.8 11.6 - 14.5 %    PLATELET 374 339 - 812 K/uL    MPV 9.2 9.2 - 11.8 FL    NEUTROPHILS 84 (H) 40 - 73 %    LYMPHOCYTES 11 (L) 21 - 52 %    MONOCYTES 4 3 - 10 % EOSINOPHILS 1 0 - 5 %    BASOPHILS 0 0 - 2 %    ABS. NEUTROPHILS 6.2 1.8 - 8.0 K/UL    ABS. LYMPHOCYTES 0.8 (L) 0.9 - 3.6 K/UL    ABS. MONOCYTES 0.3 0.05 - 1.2 K/UL    ABS. EOSINOPHILS 0.1 0.0 - 0.4 K/UL    ABS. BASOPHILS 0.0 0.0 - 0.1 K/UL    DF AUTOMATED     METABOLIC PANEL, COMPREHENSIVE    Collection Time: 03/30/20  6:02 PM   Result Value Ref Range    Sodium 139 136 - 145 mmol/L    Potassium 4.3 3.5 - 5.5 mmol/L    Chloride 104 100 - 111 mmol/L    CO2 26 21 - 32 mmol/L    Anion gap 9 3.0 - 18 mmol/L    Glucose 320 (H) 74 - 99 mg/dL    BUN 16 7.0 - 18 MG/DL    Creatinine 0.94 0.6 - 1.3 MG/DL    BUN/Creatinine ratio 17 12 - 20      GFR est AA >60 >60 ml/min/1.73m2    GFR est non-AA >60 >60 ml/min/1.73m2    Calcium 9.6 8.5 - 10.1 MG/DL    Bilirubin, total 0.5 0.2 - 1.0 MG/DL    ALT (SGPT) 64 (H) 13 - 56 U/L    AST (SGOT) 32 10 - 38 U/L    Alk.  phosphatase 90 45 - 117 U/L    Protein, total 7.6 6.4 - 8.2 g/dL    Albumin 3.8 3.4 - 5.0 g/dL    Globulin 3.8 2.0 - 4.0 g/dL    A-G Ratio 1.0 0.8 - 1.7     NT-PRO BNP    Collection Time: 03/30/20  6:02 PM   Result Value Ref Range    NT pro- 0 - 900 PG/ML   TROPONIN I    Collection Time: 03/30/20  6:02 PM   Result Value Ref Range    Troponin-I, QT <0.02 0.0 - 0.045 NG/ML   URINALYSIS W/ RFLX MICROSCOPIC    Collection Time: 03/30/20  6:02 PM   Result Value Ref Range    Color YELLOW      Appearance CLEAR      Specific gravity 1.025 1.005 - 1.030      pH (UA) 5.0 5.0 - 8.0      Protein TRACE (A) NEG mg/dL    Glucose >1,000 (A) NEG mg/dL    Ketone TRACE (A) NEG mg/dL    Bilirubin NEGATIVE  NEG      Blood NEGATIVE  NEG      Urobilinogen 0.2 0.2 - 1.0 EU/dL    Nitrites NEGATIVE  NEG      Leukocyte Esterase NEGATIVE  NEG     D DIMER    Collection Time: 03/30/20  6:02 PM   Result Value Ref Range    D DIMER 0.40 <0.46 ug/ml(FEU)   URINE MICROSCOPIC ONLY    Collection Time: 03/30/20  6:02 PM   Result Value Ref Range    WBC 0 to 2 0 - 4 /hpf    RBC NONE 0 - 5 /hpf Epithelial cells 4+ 0 - 5 /lpf    Bacteria 3+ (A) NEG /hpf   EKG, 12 LEAD, INITIAL    Collection Time: 03/30/20  6:04 PM   Result Value Ref Range    Ventricular Rate 80 BPM    Atrial Rate 80 BPM    P-R Interval 150 ms    QRS Duration 84 ms    Q-T Interval 372 ms    QTC Calculation (Bezet) 429 ms    Calculated P Axis 41 degrees    Calculated R Axis 22 degrees    Calculated T Axis 54 degrees    Diagnosis       Normal sinus rhythm  Normal ECG  When compared with ECG of 13-MAR-2020 22:11,  No significant change was found  Confirmed by Jeanne Georges [id number]) on 3/31/2020 7:55:48 AM     POC G3    Collection Time: 03/30/20  6:28 PM   Result Value Ref Range    Device: Non rebreather      Flow rate (POC) 10 L/M    FIO2 (POC) 50 %    pH (POC) 7.364 7.35 - 7.45      pCO2 (POC) 47.7 (H) 35.0 - 45.0 MMHG    pO2 (POC) 241 (H) 80 - 100 MMHG    HCO3 (POC) 27.4 (H) 22 - 26 MMOL/L    sO2 (POC) 100 (H) 92 - 97 %    Base excess (POC) 1 mmol/L    Allens test (POC) YES      Total resp. rate 22      Site RIGHT RADIAL      Patient temp. 97.4      Specimen type (POC) ARTERIAL      Performed by Kevin Nye, POC    Collection Time: 03/30/20  9:22 PM   Result Value Ref Range    Glucose (POC) 275 (H) 70 - 110 mg/dL   GLUCOSE, POC    Collection Time: 03/31/20  7:44 AM   Result Value Ref Range    Glucose (POC) 320 (H) 70 - 110 mg/dL     Xr Chest Port    Result Date: 3/30/2020  IMPRESSION: No acute disease. Stable scarring at the lung bases.      Current Facility-Administered Medications   Medication Dose Route Frequency    ondansetron (ZOFRAN ODT) tablet 4 mg  4 mg Oral Q8H PRN    albuterol-ipratropium (DUO-NEB) 2.5 MG-0.5 MG/3 ML  3 mL Nebulization Q4H PRN    aspirin delayed-release tablet 81 mg  81 mg Oral DAILY    fluticasone propionate (FLONASE) 50 mcg/actuation nasal spray 2 Spray  2 Spray Both Nostrils DAILY    hydroCHLOROthiazide (HYDRODIURIL) tablet 12.5 mg  12.5 mg Oral DAILY    lactobacillus sp. 50 billion cpu (BIO-K PLUS) capsule 1 Cap  1 Cap Oral DAILY    lisinopriL (PRINIVIL, ZESTRIL) tablet 20 mg  20 mg Oral BID    loratadine (CLARITIN) tablet 10 mg  10 mg Oral DAILY PRN    montelukast (SINGULAIR) tablet 10 mg  10 mg Oral QHS    pantoprazole (PROTONIX) tablet 40 mg  40 mg Oral ACB    ketorolac (ACULAR) 0.5 % ophthalmic solution   Right Eye QID    simethicone (GAS-X) capsule 125 mg  125 mg Oral QID PRN    ipratropium (ATROVENT) 0.02 % nebulizer solution   Nebulization Q8H RT    acetaminophen (TYLENOL) tablet 650 mg  650 mg Oral Q4H PRN    heparin (porcine) injection 5,000 Units  5,000 Units SubCUTAneous Q8H    glucose chewable tablet 16 g  16 g Oral PRN    glucagon (GLUCAGEN) injection 1 mg  1 mg IntraMUSCular PRN    dextrose (D50W) injection syrg 12.5-25 g  25-50 mL IntraVENous PRN    insulin lispro (HUMALOG) injection   SubCUTAneous AC&HS    insulin glargine (LANTUS) injection 25 Units  25 Units SubCUTAneous QHS    levoFLOXacin (LEVAQUIN) 750 mg in D5W IVPB  750 mg IntraVENous Q24H    methylPREDNISolone (PF) (SOLU-MEDROL) injection 60 mg  60 mg IntraVENous Q12H    arformoteroL (BROVANA) neb solution 15 mcg  15 mcg Nebulization BID RT    budesonide (PULMICORT) 500 mcg/2 ml nebulizer suspension  1,000 mcg Nebulization BID RT    albuterol-ipratropium (DUO-NEB) 2.5 MG-0.5 MG/3 ML  3 mL Nebulization Q4H RT    albuterol-ipratropium (DUO-NEB) 2.5 MG-0.5 MG/3 ML  3 mL Nebulization Q1H PRN         Assessment/Plan:   Dahiana De Dios is a 60 y.o. female with PMH of COPD on home O2, IDDM2, HTN, HFpEF, SHERRIE on CPAP, GERD, allergic rhinitis, now admitted for COPD exacerbation.      COPD exacerbation  Likely 2/2 viral infx vs pneumonia vs seasonal allergies. Has hx of asthma/COPD on 2L home 02 and sleeps with trellegy. Followed by Dr. Kirill Jara in OP. Per last pulm note pt may have environmental triggers.  Previously hospitalized 4x in 2019 for COPD exacerbations, This is third admission this month, most recent 3/23-3/25. Symptoms started to worsen the night of 3/30/20 while still on 40 pred daily from last admission. No sick contacts, no GI symptoms, no fevers. ABG showed primary respiratory acidosis consistent with chronicity of COPD. D-dimer, Cardiac enzymes and BNP WNL. In ED, afebrile, clear CXR, normal WBCs. Satting 92-97% on 10 L bag valve mask. Speaking in very short sentences with significantly increased respiratory effort. In ED given 1 duoneb, mag, lasix and placed on bipap. Pt's ABG with pH 7.36, pCO2 47.7, pO2 241, HCO3: 27.4 on 10L non-re breather.  - Discontinue BIPAP and transition to NC  - Started diet since pt stable off BIPAP  - oxygen as needed, goal 88-92%  - CBC, BMP daily  - Duonebs q4hrs scheduled  - IV solumedrol 60 BID 5 days  - Levaquin 750 daily  - consult to CM/PT/OT  -Fall precautions  -Continue home Singulair and loratadine  -Home advair>arformotorol+budesonide   -Consider prolonged steroid taper  -Consulted Pulmonology, appreciate recommendations     SHERRIE/pulm htn  Intolerant to Cpap. On trellegy nightly at home  - Continue bipap qhs even after stable off bipap during the day     Insuline dependent Type 2 Diabetes  Home lantus 35u and novolog SSI. BS 320 on admission. Pt will be NPO while on BIPAP but also starting steroids and pt has been poorly controlled outpatient.  -Lantus 25U and likely increase above home dose 35 once eating  -SSI  -poc glucose achs     Hypertension, HFpEF  Stable.  Echo 7/7/18 EF 66-70%, LV mild concentric hypertrophy, No left regional wall motion abnorm. SBP in ED elevated 190s improved to 140's-160's.  -continue home lisinopril 20mg bid, HCTZ 12.5mg daily     GERD  Takes omeprazole 40mg daily and pepcid for breakthrough symptoms.   -continue home regimen      Allergic Rhinitis  -home flonase, singulair 10mg daily      Diet: Diabetic diet   DVT Prophylaxis: [facility]  Code Status: Full Code  [place] of [address], daughter, [phone]     Disposition and anticipated LOS:  3-4 midnights       Signed By: Dea Wong MD     March 31, 2020

## 2020-03-31 NOTE — PROGRESS NOTES
Problem: Mobility Impaired (Adult and Pediatric)  Goal: *Acute Goals and Plan of Care (Insert Text)  Description: Physical Therapy Goals  Initiated 3/31/2020 and to be accomplished within 4 day(s)   2. Patient will transfer from bed to chair and chair to bed with modified independence using the least restrictive device. 3.  Patient will perform sit to stand with modified independence. 4.  Patient will ambulate with modified independence for 60 feet with the least restrictive device. 5.  Patient will ascend/descend 4 stairs with unilateral handrail(s) with supervision/set-up. PLOF: Pt reports she is independent with ADLs and functional mobility. Outcome: Progressing Towards Goal     PHYSICAL THERAPY EVALUATION    Patient: Kendrick Eubanks (89 y.o. female)  Date: 3/31/2020  Primary Diagnosis: COPD exacerbation ([facility] [address] [J44.1]        Precautions: falls         ASSESSMENT :  RN cleared pt to participate in skilled therapy evaluation and to verbally consents. Pt semi reclined in bed requesting to use the rest room. She performs bed mobility at mod (I) level and sit to stand transfers with supervision. Pt on 3L O2 via NC, uses home O2. She ambulates short distances inside room with supervision and fair (+) balance without AD. Pt c/o SOB, educated on energy conservation, pursed lip breathing, and activity modification; she verbalizes understanding. Based on the objective data described below, the patient presents with decreased strength, impaired balance, decreased activity tolerance, and decrease safety with functional mobility. Pt left sitting in chair at bedside, O2 sats at 97%, and all needs met. Patient will benefit from continued skilled PT at this time to increase endurance in order to maximize safety with functional mobility. Patient will benefit from skilled intervention to address the above impairments.   Patient's rehabilitation potential is considered to be Good  Factors which may influence rehabilitation potential include:   []         None noted  []         Mental ability/status  [x]         Medical condition  []         Home/family situation and support systems  []         Safety awareness  []         Pain tolerance/management  []         Other:      PLAN :  Recommendations and Planned Interventions:   []           Bed Mobility Training             []    Neuromuscular Re-Education  [x]           Transfer Training                   []    Orthotic/Prosthetic Training  [x]           Gait Training                          []    Modalities  [x]           Therapeutic Exercises           []    Edema Management/Control  [x]           Therapeutic Activities            []    Family Training/Education  [x]           Patient Education  []           Other (comment):    Frequency/Duration: Patient will be followed by physical therapy 1-2 times per day/4-7 days per week to address goals. Discharge Recommendations: Home Health  Further Equipment Recommendations for Discharge: N/A     SUBJECTIVE:   Patient stated I only use my walker when I'm not feeling well.     OBJECTIVE DATA SUMMARY:     Past Medical History:   Diagnosis Date    Asthma     Chronic lung disease     COPD     Cystocele, midline     Diabetes mellitus ([facility] [address]     GERD (gastroesophageal reflux disease)     Hidradenitis suppurativa     Hyperlipidemia     Hypertension     SHERRIE on CPAP     CPAP    Stress incontinence      Past Surgical History:   Procedure Laterality Date    BREAST SURGERY PROCEDURE UNLISTED      Right breast benign tumor removal    HX APPENDECTOMY      HX CHOLECYSTECTOMY      HX DILATION AND CURETTAGE      Dysfunctional uterine bleeding, thought 2/2 fibroids    HX TUBAL LIGATION       Barriers to Learning/Limitations: None  Compensate with: N/A  Home Situation:  Home Situation  Home Environment: Private residence  One/Two Story Residence: Two story  Living Alone: No  Support Systems: Child(kim)  Patient Expects to be Discharged to[de-identified] Private residence  Current DME Used/Available at Home: CPAP  Tub or Shower Type: Tub/Shower combination  Critical Behavior:  Neurologic State: Alert  Orientation Level: Oriented X4  Cognition: Follows commands  Safety/Judgement: Fall prevention  Psychosocial  Patient Behaviors: Calm; Cooperative  Needs Expressed: Educational  Purposeful Interaction: Yes  Pt Identified Daily Priority: Clinical issues (comment)  Caritas Process: Establish trust;Teaching/learning; Attend basic human needs  Caring Interventions: Reassure; Therapeutic modalities  Reassure: Therapeutic listening; Informing;Quiet presence  Therapeutic Modalities: Intentional therapeutic touch  Other Caring Modalities: Hourly rounds   Skin Condition/Temp: Dry;Warm     Skin Integrity: Intact  Skin Integumentary  Skin Color: Appropriate for ethnicity  Skin Condition/Temp: Dry;Warm  Skin Integrity: Intact  Turgor: Non-tenting  Hair Growth: Present  Varicosities: Absent     Strength:    Strength: Generally decreased, functional     Tone & Sensation:   Tone: Normal  Sensation: Intact    Range Of Motion:  AROM: Within functional limits    Functional Mobility:  Bed Mobility:  Supine to Sit: Modified independent     Transfers:  Sit to Stand: Supervision  Stand to Sit: Supervision        Balance:   Sitting: Intact  Standing: Without support    Ambulation/Gait Training:  Distance (ft): 10 Feet (ft)(+ 10 ft)  Assistive Device: (none)  Ambulation - Level of Assistance: Supervision  Speed/Pam: Slow  Step Length: Right shortened;Left shortened  Interventions: Verbal cues; Safety awareness training       Pain:  Pain level pre-treatment: 0/10   Pain level post-treatment: 0/10   Pain Intervention(s) : Medication (see MAR); Rest, Ice, Repositioning  Response to intervention: Nurse notified, See doc flow    Activity Tolerance:   Fair  Please refer to the flowsheet for vital signs taken during this treatment.   After treatment:   [x]         Patient left in no apparent distress sitting up in chair  []         Patient left in no apparent distress in bed  [x]         Call bell left within reach  [x]         Nursing notified  []         Caregiver present  []         Bed alarm activated  []         SCDs applied    COMMUNICATION/EDUCATION:   [x]         Role of Physical Therapy in the acute care setting. [x]         Fall prevention education was provided and the patient/caregiver indicated understanding. [x]         Patient/family have participated as able in goal setting and plan of care. []         Patient/family agree to work toward stated goals and plan of care. []         Patient understands intent and goals of therapy, but is neutral about his/her participation. []         Patient is unable to participate in goal setting/plan of care: ongoing with therapy staff.  []         Other:     Thank you for this referral.  Fredo Simpson, PT   Time Calculation: 25 mins      Eval Complexity: History: MEDIUM  Complexity : 1-2 comorbidities / personal factors will impact the outcome/ POC Exam:LOW Complexity : 1-2 Standardized tests and measures addressing body structure, function, activity limitation and / or participation in recreation  Presentation: LOW Complexity : Stable, uncomplicated  Clinical Decision Making:Low Complexity    Overall Complexity:LOW

## 2020-03-31 NOTE — PROGRESS NOTES
Reason for Admission:   COPD exacerbation ([facility] [address] [J44.1]               RRAT Score:     38             Resources/supports as identified by patient/family:       Top Challenges facing patient (as identified by patient/family and CM): Finances/Medication cost?     No needs identified  Transportation      daughter  Support system or lack thereof? 4 children  Living arrangements? Lives with daughter   Self-care/ADLs/Cognition? HAS A PERSONAL CARE AIDE 6 HOURS A DAY MON-FRI FROM [facility]        Current Advanced Directive/Advance Care Plan:   no                          Plan for utilizing home health:    yes                      Likelihood of readmission:   HIGH    Transition of Care Plan:                    Initial assessment completed with patient. Cognitive status of patient: oriented to time, place, person and situation. Face sheet information confirmed:  yes. The patient designates Ivan Holguin [phone] to participate in her discharge plan and to receive any needed information. This patient lives in a single family home with patient and daughter. Patient is not able to navigate steps as needed. Prior to hospitalization, patient was considered to be independent with ADLs/IADLS : no . If not independent,  patient needs assist with : dressing, bathing, food preparation, cooking, toileting and grooming    Patient has a current ACP document on file: no  The patient and daughter will be available to transport patient home upon discharge. The patient already has Walker, BSC, OXYGEN TANKS AND CONCENTRATOR FROM [facility] [employer], NEBULIZER AND TRILOGY, medical equipment available in the home. Patient is not currently active with home health. Patient has not stayed in a skilled nursing facility or rehab. Was  stay within last 60 days : no.       This patient is on dialysis :no    List of available Home Health agencies were provided and reviewed with the patient prior to discharge. Freedom of choice signed: yes, for [facility]. Currently, the discharge plan is Home with [address] [facility]. PATIENT REFUSING SNF AT THIS TIME DUE TO NO VISITORS. The patient states that she can obtain her medications from the pharmacy, and take her medications as directed. Patient's current insurance is MEDICARE AND MEDICAID. MCare Management Interventions  PCP Verified by CM:  Yes  Mode of Transport at Discharge: Self  Physical Therapy Consult: Yes  Occupational Therapy Consult: Yes  Current Support Network: Lives with Caregiver(daughter)  Confirm Follow Up Transport: Family  The Plan for Transition of Care is Related to the Following Treatment Goals : home health  The Patient and/or Patient Representative was Provided with a Choice of Provider and Agrees with the Discharge Plan?: Yes  Freedom of Choice List was Provided with Basic Dialogue that Supports the Patient's Individualized Plan of Care/Goals, Treatment Preferences and Shares the Quality Data Associated with the Providers?: Yes  Discharge Location  Discharge Placement: Home with home health        Fawn Lange RN BSN  Care Manager  [phone]

## 2020-03-31 NOTE — ED NOTES
Paged respiratory to place on non invasive positive pressure. No BSC available and refused external catheter.

## 2020-03-31 NOTE — PROGRESS NOTES
Problem: Self Care Deficits Care Plan (Adult)  Goal: *Acute Goals and Plan of Care (Insert Text)  Outcome: Resolved/Met   OCCUPATIONAL THERAPY EVALUATION/DISCHARGE    Patient: Gely Mejia (10 y.o. female)  Date: 3/31/2020  Primary Diagnosis: COPD exacerbation ([facility] [address] [J44.1]        Precautions: Falls     PLOF: Pt lives with her daughter and grandchildren in a [place]. Pt was (I) with basic self-care/ADLs and functional mobility without AD PTA. Pt would intermittently use a RW, if needed. Pt has PCA 6 hours a day M-F. Pt on 2L O2 at baseline. ASSESSMENT AND RECOMMENDATIONS:  Pt cleared to participate in OT evaluation by Rn. Upon entering room, pt supine with HOB elevated, alert, and agreeable to therapy session. Based on the objective data described below, the patient presents she is Mod(I) with basic self-care/ADLs, but requires supervision for standing activities including toilet transfers and grooming at sink level. Will defer to PT for functional balance and mobility. Pt on 3L O2 via NC. Due to short O2 line, pt temporarily doffed while on toilet; extender provided. Pt agreeable to sitting in chair at the end of the session. Educated pt on the role of Ot, evaluation process, self-pacing, and pursed lip breathing with pt demo good understanding. Pt reports she has PCA to assist when she returns home. Skilled occupational therapy is not indicated at this time.     Discharge Recommendations: Home Health safety evaluation  Further Equipment Recommendations for Discharge: N/A     SUBJECTIVE:   Patient stated you get the extended, I'm going to the toilet\"    OBJECTIVE DATA SUMMARY:     Past Medical History:   Diagnosis Date    Asthma     Chronic lung disease     COPD     Cystocele, midline     Diabetes mellitus ([facility] [address]     GERD (gastroesophageal reflux disease)     Hidradenitis suppurativa     Hyperlipidemia     Hypertension     SHERRIE on CPAP     CPAP    Stress incontinence      Past Surgical History: Procedure Laterality Date    BREAST SURGERY PROCEDURE UNLISTED      Right breast benign tumor removal    HX APPENDECTOMY      HX CHOLECYSTECTOMY      HX DILATION AND CURETTAGE      Dysfunctional uterine bleeding, thought 2/2 fibroids    HX TUBAL LIGATION       Barriers to Learning/Limitations: None  Compensate with: visual, verbal, tactile, kinesthetic cues/model    Home Situation:   Home Situation  Home Environment: Private residence  One/Two Story Residence: Two story  Living Alone: No  Support Systems: Child(kim)  Patient Expects to be Discharged to[de-identified] Private residence  Current DME Used/Available at Home: CPAP  Tub or Shower Type: Tub/Shower combination  []     Right hand dominant   []     Left hand dominant    Cognitive/Behavioral Status:  Neurologic State: Alert  Orientation Level: Oriented X4  Cognition: Follows commands  Safety/Judgement: Fall prevention    Skin: Visible skin appeared intact  Edema: None noted      Coordination: BUE  Coordination: Within functional limits  Fine Motor Skills-Upper: Left Intact; Right Intact    Gross Motor Skills-Upper: Left Intact; Right Intact    Balance:  Sitting: Intact  Standing: Without support    Strength: BUE  Strength: Generally decreased, functional    Tone & Sensation: BUE  Tone: Normal  Sensation: Intact    Range of Motion: BUE  AROM: Within functional limits      Functional Mobility and Transfers for ADLs:  Bed Mobility:  Supine to Sit: Modified independent    Transfers:  Sit to Stand: Supervision  Stand to Sit: Supervision   Toilet Transfer : Supervision    ADL Assessment:  Feeding: Modified independent    Oral Facial Hygiene/Grooming: Modified Independent    Bathing: Supervision    Upper Body Dressing: Modified independent    Lower Body Dressing: Modified independent    Toileting: Supervision      ADL Intervention:  Grooming at sink level: Supervision    Lower Body Dressing Assistance  Dressing Assistance: Independent; Modified independent  Socks: Modified independent    Toileting  Toileting Assistance: Supervision    Cognitive Retraining  Safety/Judgement: Fall prevention      Pain:  Pain level pre-treatment: 0/10   Pain level post-treatment: 0/10   Pain Intervention(s): Medication (see MAR); Rest, Ice, Repositioning  Response to intervention: Nurse notified, See doc flow    Activity Tolerance:   Good    Please refer to the flowsheet for vital signs taken during this treatment. After treatment:   [x]  Patient left in no apparent distress sitting up in chair  []  Patient left in no apparent distress in bed  [x]  Call bell left within reach  [x]  Nursing notified  []  Caregiver present  []  Bed alarm activated    COMMUNICATION/EDUCATION:   [x]      Role of Occupational Therapy in the acute care setting  [x]      Home safety education was provided and the patient/caregiver indicated understanding. [x]      Patient/family have participated as able and agree with findings and recommendations. []      Patient is unable to participate in plan of care at this time. Thank you for this referral.  Olesya Garner MS, OTR/L  Time Calculation: 23 mins      Eval Complexity: History: MEDIUM Complexity : Expanded review of history including physical, cognitive and psychosocial  history ; Examination: LOW Complexity : 1-3 performance deficits relating to physical, cognitive , or psychosocial skils that result in activity limitations and / or participation restrictions ;    Decision Making:LOW Complexity : No comorbidities that affect functional and no verbal or physical assistance needed to complete eval tasks

## 2020-03-31 NOTE — DIABETES MGMT
Glycemic Control Plan of Care    T2DM with current A1c of  8.2% (3/05/2020). See separate notes, 03/31/2020, for assessment of home diabetes management/education. Home diabetes medications:   Basaglar (lantus) pen insulin 35 units daily at bedtime. Novolog pen sliding scale insulin TID AC. IV solumedrol 60 mg every 12 hours. POC BG report on 03/30/2020: 275. POC BG report on 03/31/2020 at time of review: 320, 288. Increased basal lantus insulin dose from 25 to 30 units daily. Cont correctional lispro insulin at very resistant dose. Recommendation(s):  1.) cont glycemic monitoring and titrate basal lantus insulin dose if BG remain above target range. Assessment:  Patient is 60year old with past medical history including type 2 diabetes mellitus, asthma, COPD, GERD, hyperlipidemia, SHERRIE of Cpap, and hyperlipidemia - was admitted on 3/30/2020 with report of worsening shortness of breath. Noted:  COPD exacerbation. T2DM    Most recent blood glucose values:        Current A1C: 8.2% (3/05/2020) which is equivalent to estimated average blood glucose of 189 mg/dL during the past 2-3 months. Current hospital diabetes medications:  Basal lantus insulin 35 units daily at bedtime. Correctional lispro insulin ACHS. Very resistant dose. Total daily dose insulin requirement previous day: 03/30/2020:  Lantus: 25 units  Lispro: 9 units  TTD insulin: 34 units    Diet: Diabetic consistent carb regular. Goals:  Blood glucose will be within target range of  mg/dL by 04/03/2020.     Education:  _X__  Refer to Diabetes Education Record: 03/31/2020             ___  Education not indicated at this time    Shruthi Prabhakar RN [facility]  Pager: [phone]

## 2020-03-31 NOTE — DIABETES MGMT
Diabetes Patient/Family Education Record  Factors That  May Influence Patients Ability  to Learn or  Comply with Recommendations   []? Language barrier    []? Cultural needs   []? Motivation    []? Cognitive limitation    []? Physical   [x]? Education    []? Physiological factors   []? Hearing/vision/speaking impairment   []? Quaker beliefs    []? Financial factors   []? Other:   []? No factors identified at this time.      Person Instructed:   [x]? Patient   []? Family   []? Other      Preference for Learning:   []? Verbal   []? Written   []? Demonstration      Level of Comprehension & Competence:    [x]? Good                                      []? Fair                                     []?  Poor                             []?  Needs Reinforcement   [x]? Teachback completed    Education Component:       [x]? Medication management, including how to administer insulin (if appropriate) and potential medication interactions: Yes. Patient reported list of home diabetes medications:  Basaglar (lantus) pen insulin 35 units daily at bedtime. Novolog pen sliding scale insulin TID AC. [x]? Nutritional management -obtain usual meal pattern: Yes. Patient does not follow specific meal plan but pays attention to serving size/portion control of carbs (starches, fruits, dairy).      []? Exercise   [x]? Signs, symptoms, and treatment of hyperglycemia and hypoglycemia: Yes. Patient verbalized understanding. [x]? Prevention, recognition and treatment of hyperglycemia and hypoglycemia: Yes. Patient verbalized understanding of hypoglycemia: symptoms, treatment, cause, prevention.      [x]? Importance of blood glucose monitoring and how to obtain a blood glucose meter: Yes.    []? Instruction on use of the blood glucose meter   [x]? Discuss the importance of HbA1C monitoring: Yes.  Discussed that her current A1c level is 8.2% (3/05/2020) which is equivalent to estimated average blood glucose of 189 mg/dL during the past 2-3 months. Her prior A1c level was 8.3% (10/15/2020).       []? Sick day guidelines   [x]? Proper use and disposal of lancets, needles, syringes or insulin pens (if appropriate): Yes. [x]? Potential long-term complications (retinopathy, kidney disease, neuropathy, foot care): Yes. [x]? Information about whom to contact in case of emergency or for more information: Yes. [x]? Goal:  Patient/family will demonstrate understanding of Diabetes Self Management Skills by: 3/31/2020  Plan for post-discharge education or self-management support:    []? Outpatient class schedule provided            [x]? Patient Declined: patient is not interested at this time. []? Scheduled for outpatient classes (date) _______  Verify:  Does patient understand how diabetes medications work? Yes. Does patient know what their most recent A1c is? Yes. Does patient monitor glucose at home? Yes. Does patient have difficulty obtaining diabetes medications and testing supplies?  No.         Enid Lew RN [facility]  Pager: [phone]

## 2020-03-31 NOTE — ROUTINE PROCESS
0730 - Bedside and Verbal shift change report given to Shaan Ridley RN (oncoming nurse) by Isaura Verduzco RN (offgoing nurse). Report included the following information SBAR, Kardex, Procedure Summary, Intake/Output, MAR, Recent Results, Med Rec Status and Cardiac Rhythm NSR .     0844 - Administered scheduled insulin per protocol. PO meds held for NPO status and continuous BIPAP. 1000 - Pt transitioned from BIPAP to 3L NC. SpO2 96. Will continue to monitor. 1016 - Scheduled PO meds administered, pt resting quietly in bed, no c/o pain or discomfort, NAD.     1100 - MD at bedside, pt in bed eating meal.     1200 - Hourly rounds, 5 p's addressed. Pt resting quietly in bed.     1300 - Pt accidentally removed 20 g PIV on RUE, new PIV 22 g inserted by primary nurse on right hand. Scheduled Azithromycin administered. 1400 - Hourly rounds, 5 p's addressed. Pt resting quietly in bed.     1500 - Hourly rounds, 5 p's addressed. Pt resting quietly in bed.     1600 - Hourly rounds, 5 p's addressed. Pt sleeping, resting quietly in bed. 1710 - Scheduled meds administered, NAD, resting quietly in bed, vitals WDL, no c/o pain or discomfort. 1826 - Pt resting quietly in bed, NAD, vitals WDL, HR 90, SpO2 97 on 3L NC.     1930 - Bedside and Verbal shift change report given to Corinne Adams, RN (oncoming nurse) by Shaan Carroll RN (offgoing nurse). Report included the following information SBAR, Kardex, Procedure Summary, Intake/Output, MAR, Recent Results, Med Rec Status and Cardiac Rhythm NSR.

## 2020-03-31 NOTE — CONSULTS
[facility]  Pulmonary, Critical Care, and Sleep Medicine    Initial Patient Consult    Name: Andrez Ugarte MRN: [id number]   : 1960 Hospital: [facility]   Date: 3/31/2020        IMPRESSION:   · Acute on chronic hypoxic hypercapnic respiratory failure related to COPD exacerbation  · No clear evidence of infection  · COPD on LTOT and Trilogy NIV, possibly Asthma/COPD overlap. On Singulair long term, unable to tolerate GI side effects of Daliresp  · SHERRIE on NIV, intolerant of CPAP  · Morbid obesity  · HTN  · DM uncontrolled, aggravated by systemic steroids      RECOMMENDATIONS:   · Continue scheduled Duonebs, Pulmicort and Brovana  · Start Azithromycin for anti inflammatory effects. Will continue at lower dose Mon to Fri indefinitely  · NIV prn  · Titrate FiO2 to saturation in low 90's, avoid over oxygenation  · Discontinue Levaquin  · Would consider evaluating for biologics on discharge as pt with prior history of eosinophilic asthma  · Glycemic control and prophylaxis issues per primary team  · Will follow with you. Thank you for consulting     Subjective: This patient has been seen and evaluated at the request of Dr. Chacha Colunga for COPD exacerbation. Patient is a 60 y.o. female with COPD on LTOT with frequent hospital admissions. Pt was readmitted for several days of worsening shortness of breath with mild cough and no fever. This occurred shortly after discharge for COPD exacerbation. Pt denies chest pain or hemoptysis. Pt was treated with bronchodilators and steroids, along with BIPAP, transitioned to O2. She reports some improvement in dyspnea and wheezing, but is not quite at baseline.        Past Medical History:   Diagnosis Date    Asthma     Chronic lung disease     COPD     Cystocele, midline     Diabetes mellitus (HCC)     GERD (gastroesophageal reflux disease)     Hidradenitis suppurativa     Hyperlipidemia     Hypertension     SHERRIE on CPAP CPAP    Stress incontinence       Past Surgical History:   Procedure Laterality Date    BREAST SURGERY PROCEDURE UNLISTED      Right breast benign tumor removal    HX APPENDECTOMY      HX CHOLECYSTECTOMY      HX DILATION AND CURETTAGE      Dysfunctional uterine bleeding, thought 2/2 fibroids    HX TUBAL LIGATION        Prior to Admission medications    Medication Sig Start Date End Date Taking? Authorizing Provider   predniSONE (DELTASONE) 10 mg tablet Take 60 mg by mouth daily for 4 days, THEN 40 mg daily for 4 days, THEN 20 mg daily for 4 days, THEN 10 mg daily for 4 days. Indications: worsening chronic obstructive pulmonary disease 3/25/20 4/10/20 Yes Jono Watkins MD   predniSONE (DELTASONE) 10 mg tablet Take 10 mg by mouth every other day. 4/11/20  Yes Jono Watkins MD   lactobacillus sp. 50 billion cpu (BIO-K PLUS) 50 billion cell -375 mg cap capsule Take 1 Cap by mouth daily. 3/10/20  Yes Ed Feng MD   PROLENSA 0.07 % ophthalmic solution Administer 1 Drop to right eye daily. 12/19/19  Yes Provider, Historical   simethicone (GAS-X) 125 mg capsule Take 125 mg by mouth four (4) times daily as needed for Flatulence. Yes Provider, Historical   loratadine (CLARITIN) 10 mg tablet Take 10 mg by mouth daily as needed for Allergies. Yes Provider, Historical   lisinopril (PRINIVIL, ZESTRIL) 20 mg tablet Take 20 mg by mouth two (2) times a day. Yes Provider, Historical   albuterol sulfate (PROVENTIL;VENTOLIN) 2.5 mg/0.5 mL nebu nebulizer solution 0.5 mL by Nebulization route four (4) times daily as needed for Wheezing. To be used with HyperSal nebulizer solution. ICD code: COPD J44.1 7/2/19  Yes Levi Ludwig MD   fluticasone propionate [facility] ALLERGY RELIEF) 50 mcg/actuation nasal spray 2 Sprays by Both Nostrils route daily. Yes Provider, Historical   fluticasone propion-salmeterol (ADVAIR HFA) 230-21 mcg/actuation inhaler Take 2 Puffs by inhalation two (2) times a day.    Yes Provider, Historical   hydroCHLOROthiazide (HYDRODIURIL) 12.5 mg tablet Take 12.5 mg by mouth daily. Yes Provider, Historical   tiotropium bromide (SPIRIVA RESPIMAT) 2.5 mcg/actuation inhaler Take 2 Puffs by inhalation daily. Yes Provider, Historical   insulin glargine (LANTUS,BASAGLAR) 100 unit/mL (3 mL) inpn 35 Units by SubCUTAneous route nightly. 11/20/18  Yes Elizabeth Lee MD   albuterol sulfate 90 mcg/actuation aepb Take 1 Puff by inhalation every four (4) hours. Patient taking differently: Take 1 Puff by inhalation every four (4) hours as needed. 8/25/18  Yes Wing Yobani MD   NOVOLOG FLEXPEN U-100 INSULIN 100 unit/mL inpn Continue home Sliding scale insulin as prior to admission  Patient taking differently: 1 Units by SubCUTAneous route three (3) times daily. If BG <100=0u  101-150=5u  151-250=8u  251-300=12u  >300 call MD   7/10/18  Yes Trudi Tariq MD   omeprazole (PRILOSEC) 40 mg capsule Take 40 mg by mouth daily. Indications: gastroesophageal reflux disease   Yes Provider, Historical   OXYGEN-AIR DELIVERY SYSTEMS 2 L by Nasal route continuous. First Choice   Yes Provider, Historical   aspirin delayed-release 81 mg tablet Take 81 mg by mouth daily. Yes Provider, Historical   montelukast (SINGULAIR) 10 mg tablet Take 10 mg by mouth nightly. Yes Other, MD Lauren     Allergies   Allergen Reactions    Ancef [Cefazolin] Hives    Contrast Agent [Iodine] Anaphylaxis, Shortness of Breath and Swelling     Needs pre-medication for IV contrast with Benadryl, Solu-Medrol    Fish Containing Products Anaphylaxis     Pt states she had a severe allergic reaction at 7 y/o.  Statins-Hmg-Coa Reductase Inhibitors Myalgia    Metformin Other (comments)     Abdominal pain, diarrhea.     Codeine Other (comments)     Altered mental status      Social History     Tobacco Use    Smoking status: Former Smoker     Packs/day: 1.00     Years: 30.00     Pack years: 30.00     Types: Cigarettes     Start date: 1966     Last attempt to quit: 2006     Years since quittin.5    Smokeless tobacco: Never Used    Tobacco comment: 1-1.5 packs per day   Substance Use Topics    Alcohol use: No      Family History   Problem Relation Age of Onset    Hypertension Mother     Stroke Mother     Breast Cancer Mother         Bilateral mastectomies    Cancer Mother         ovarian and breast    Heart Failure Mother     Heart Attack Father         2011    Heart Surgery Father         CABG    Heart Failure Father     COPD Sister         Heavy smoker    Cancer Sister         ovarian    Heart Failure Sister     Lung Disease Sister     Asthma Child     Cancer Maternal Aunt         pancreatic    Cancer Maternal Grandfather         stomach        Current Facility-Administered Medications   Medication Dose Route Frequency    insulin glargine (LANTUS) injection 35 Units  35 Units SubCUTAneous QHS    azithromycin (ZITHROMAX) 500 mg in  mL  500 mg IntraVENous Q24H    aspirin delayed-release tablet 81 mg  81 mg Oral DAILY    fluticasone propionate (FLONASE) 50 mcg/actuation nasal spray 2 Spray  2 Spray Both Nostrils DAILY    hydroCHLOROthiazide (HYDRODIURIL) tablet 12.5 mg  12.5 mg Oral DAILY    lactobacillus sp. 50 billion cpu (BIO-K PLUS) capsule 1 Cap  1 Cap Oral DAILY    lisinopriL (PRINIVIL, ZESTRIL) tablet 20 mg  20 mg Oral BID    montelukast (SINGULAIR) tablet 10 mg  10 mg Oral QHS    pantoprazole (PROTONIX) tablet 40 mg  40 mg Oral ACB    ketorolac (ACULAR) 0.5 % ophthalmic solution   Right Eye QID    heparin (porcine) injection 5,000 Units  5,000 Units SubCUTAneous Q8H    insulin lispro (HUMALOG) injection   SubCUTAneous AC&HS    methylPREDNISolone (PF) (SOLU-MEDROL) injection 60 mg  60 mg IntraVENous Q12H    arformoteroL (BROVANA) neb solution 15 mcg  15 mcg Nebulization BID RT    budesonide (PULMICORT) 500 mcg/2 ml nebulizer suspension  1,000 mcg Nebulization BID RT    albuterol-ipratropium (DUO-NEB) 2.5 MG-0.5 MG/3 ML  3 mL Nebulization Q4H RT       Review of Systems:  Pertinent items are noted in HPI. Objective:   Vital Signs:    Visit Vitals  /75   Pulse 85   Temp 96.4 °F (35.8 °C)   Resp 17   Wt 115.2 kg (254 lb)   SpO2 95%   BMI 44.99 kg/m²       O2 Device: Nasal cannula   O2 Flow Rate (L/min): 3 l/min   Temp (24hrs), Av.1 °F (36.2 °C), Min:96.4 °F (35.8 °C), Max:97.5 °F (36.4 °C)       Intake/Output:   Last shift:      No intake/output data recorded. Last 3 shifts: No intake/output data recorded. No intake or output data in the 24 hours ending 20 1059   Physical Exam:   General:  Alert, cooperative, no distress, appears stated age. Head:  Normocephalic, without obvious abnormality, atraumatic. Eyes:  Conjunctivae/corneas clear. PERRL, EOMs intact. Nose: Nares normal.  Mucosa normal. No drainage or sinus tenderness. Throat: Lips, mucosa, and tongue normal. Teeth and gums normal.   Neck: Supple, symmetrical, trachea midline, no adenopathy, thyroid: no enlargment/tenderness/nodules, no carotid bruit and no JVD. Back:   Symmetric, no curvature. ROM normal.   Lungs:   Distant breath sounds. No rales or wheezes   Chest wall:  No tenderness or deformity. Heart:  Regular rate and rhythm, S1, S2 normal, no murmur, click, rub or gallop. Abdomen:   Soft, non-tender. Bowel sounds normal. No masses,  No organomegaly. Extremities: Extremities normal, atraumatic, no cyanosis or edema. Pulses: 2+ and symmetric all extremities.    Skin: Skin color, texture, turgor normal. No rashes or lesions   Lymph nodes: Cervical, supraclavicular  nodes normal.   Neurologic: Grossly nonfocal     Data review:     Recent Results (from the past 24 hour(s))   CBC WITH AUTOMATED DIFF    Collection Time: 20  6:02 PM   Result Value Ref Range    WBC 7.4 4.6 - 13.2 K/uL    RBC 4.36 4.20 - 5.30 M/uL    HGB 13.0 12.0 - 16.0 g/dL    HCT 37.8 35.0 - 45.0 %    MCV 86.7 74.0 - 97.0 FL    MCH 29.8 24.0 - 34.0 PG    MCHC 34.4 31.0 - 37.0 g/dL    RDW 13.8 11.6 - 14.5 %    PLATELET 358 372 - 344 K/uL    MPV 9.2 9.2 - 11.8 FL    NEUTROPHILS 84 (H) 40 - 73 %    LYMPHOCYTES 11 (L) 21 - 52 %    MONOCYTES 4 3 - 10 %    EOSINOPHILS 1 0 - 5 %    BASOPHILS 0 0 - 2 %    ABS. NEUTROPHILS 6.2 1.8 - 8.0 K/UL    ABS. LYMPHOCYTES 0.8 (L) 0.9 - 3.6 K/UL    ABS. MONOCYTES 0.3 0.05 - 1.2 K/UL    ABS. EOSINOPHILS 0.1 0.0 - 0.4 K/UL    ABS. BASOPHILS 0.0 0.0 - 0.1 K/UL    DF AUTOMATED     METABOLIC PANEL, COMPREHENSIVE    Collection Time: 03/30/20  6:02 PM   Result Value Ref Range    Sodium 139 136 - 145 mmol/L    Potassium 4.3 3.5 - 5.5 mmol/L    Chloride 104 100 - 111 mmol/L    CO2 26 21 - 32 mmol/L    Anion gap 9 3.0 - 18 mmol/L    Glucose 320 (H) 74 - 99 mg/dL    BUN 16 7.0 - 18 MG/DL    Creatinine 0.94 0.6 - 1.3 MG/DL    BUN/Creatinine ratio 17 12 - 20      GFR est AA >60 >60 ml/min/1.73m2    GFR est non-AA >60 >60 ml/min/1.73m2    Calcium 9.6 8.5 - 10.1 MG/DL    Bilirubin, total 0.5 0.2 - 1.0 MG/DL    ALT (SGPT) 64 (H) 13 - 56 U/L    AST (SGOT) 32 10 - 38 U/L    Alk.  phosphatase 90 45 - 117 U/L    Protein, total 7.6 6.4 - 8.2 g/dL    Albumin 3.8 3.4 - 5.0 g/dL    Globulin 3.8 2.0 - 4.0 g/dL    A-G Ratio 1.0 0.8 - 1.7     NT-PRO BNP    Collection Time: 03/30/20  6:02 PM   Result Value Ref Range    NT pro- 0 - 900 PG/ML   TROPONIN I    Collection Time: 03/30/20  6:02 PM   Result Value Ref Range    Troponin-I, QT <0.02 0.0 - 0.045 NG/ML   URINALYSIS W/ RFLX MICROSCOPIC    Collection Time: 03/30/20  6:02 PM   Result Value Ref Range    Color YELLOW      Appearance CLEAR      Specific gravity 1.025 1.005 - 1.030      pH (UA) 5.0 5.0 - 8.0      Protein TRACE (A) NEG mg/dL    Glucose >1,000 (A) NEG mg/dL    Ketone TRACE (A) NEG mg/dL    Bilirubin NEGATIVE  NEG      Blood NEGATIVE  NEG      Urobilinogen 0.2 0.2 - 1.0 EU/dL    Nitrites NEGATIVE  NEG      Leukocyte Esterase NEGATIVE  NEG     D DIMER Collection Time: 03/30/20  6:02 PM   Result Value Ref Range    D DIMER 0.40 <0.46 ug/ml(FEU)   URINE MICROSCOPIC ONLY    Collection Time: 03/30/20  6:02 PM   Result Value Ref Range    WBC 0 to 2 0 - 4 /hpf    RBC NONE 0 - 5 /hpf    Epithelial cells 4+ 0 - 5 /lpf    Bacteria 3+ (A) NEG /hpf   EKG, 12 LEAD, INITIAL    Collection Time: 03/30/20  6:04 PM   Result Value Ref Range    Ventricular Rate 80 BPM    Atrial Rate 80 BPM    P-R Interval 150 ms    QRS Duration 84 ms    Q-T Interval 372 ms    QTC Calculation (Bezet) 429 ms    Calculated P Axis 41 degrees    Calculated R Axis 22 degrees    Calculated T Axis 54 degrees    Diagnosis       Normal sinus rhythm  Normal ECG  When compared with ECG of 13-MAR-2020 22:11,  No significant change was found  Confirmed by Zahida Mancilla [id number]) on 3/31/2020 7:55:48 AM     POC G3    Collection Time: 03/30/20  6:28 PM   Result Value Ref Range    Device: Non rebreather      Flow rate (POC) 10 L/M    FIO2 (POC) 50 %    pH (POC) 7.364 7.35 - 7.45      pCO2 (POC) 47.7 (H) 35.0 - 45.0 MMHG    pO2 (POC) 241 (H) 80 - 100 MMHG    HCO3 (POC) 27.4 (H) 22 - 26 MMOL/L    sO2 (POC) 100 (H) 92 - 97 %    Base excess (POC) 1 mmol/L    Allens test (POC) YES      Total resp.  rate 22      Site RIGHT RADIAL      Patient temp. 97.4      Specimen type (POC) ARTERIAL      Performed by Tereso Sol POC    Collection Time: 03/30/20  9:22 PM   Result Value Ref Range    Glucose (POC) 275 (H) 70 - 110 mg/dL   GLUCOSE, POC    Collection Time: 03/31/20  7:44 AM   Result Value Ref Range    Glucose (POC) 320 (H) 70 - 110 mg/dL   CBC WITH AUTOMATED DIFF    Collection Time: 03/31/20 10:19 AM   Result Value Ref Range    WBC 8.6 4.6 - 13.2 K/uL    RBC 4.04 (L) 4.20 - 5.30 M/uL    HGB 12.6 12.0 - 16.0 g/dL    HCT 35.1 35.0 - 45.0 %    MCV 86.9 74.0 - 97.0 FL    MCH 31.2 24.0 - 34.0 PG    MCHC 35.9 31.0 - 37.0 g/dL    RDW 13.9 11.6 - 14.5 %    PLATELET 251 309 - 602 K/uL    MPV 8.9 (L) 9.2 - 11.8 FL NEUTROPHILS 87 (H) 40 - 73 %    LYMPHOCYTES 10 (L) 21 - 52 %    MONOCYTES 3 3 - 10 %    EOSINOPHILS 0 0 - 5 %    BASOPHILS 0 0 - 2 %    ABS. NEUTROPHILS 7.4 1.8 - 8.0 K/UL    ABS. LYMPHOCYTES 0.9 0.9 - 3.6 K/UL    ABS. MONOCYTES 0.3 0.05 - 1.2 K/UL    ABS. EOSINOPHILS 0.0 0.0 - 0.4 K/UL    ABS. BASOPHILS 0.0 0.0 - 0.1 K/UL    DF AUTOMATED         Imaging:  I have personally reviewed the patients radiographs and have reviewed the reports:  XR Results (most recent):  Results from Hospital Encounter encounter on 03/30/20   XR CHEST PORT    Narrative Portable Frontal Chest.    CPT CODE: 73854, 39683    CLINICAL HISTORY: Shortness of breath. TECHNIQUE: Single frontal view of the chest, obtained portably. COMPARISONS: 3/23/2020. FINDINGS: Study obtained in lordotic projection. Few stable linear densities at the lung bases. The lungs are otherwise clear. The cardiomediastinal silhouette is unremarkable. Pulmonary vascularity is  normal.  There are no effusions. Impression IMPRESSION:    No acute disease. Stable scarring at the lung bases.                Jesusita Morgan MD

## 2020-03-31 NOTE — PROGRESS NOTES
OT order received and chart reviewed. Pt not seen for skilled OT due to:  []  Nausea/vomiting  [x]  Eating breakfast  []  Pain  []  Pt lethargic  []  Off Unit  [] Speaking with hospital staff member  [] Other:    Will f/u later as schedule allows.      Thank you for this referral.  Lilly David MS, OTR/L

## 2020-03-31 NOTE — PROGRESS NOTES
Progressing towards goals. Problem: Diabetes Self-Management  Goal: *Disease process and treatment process  Description: Define diabetes and identify own type of diabetes; list 3 options for treating diabetes. Outcome: Progressing Towards Goal  Goal: *Incorporating nutritional management into lifestyle  Description: Describe effect of type, amount and timing of food on blood glucose; list 3 methods for planning meals. Outcome: Progressing Towards Goal  Goal: *Incorporating physical activity into lifestyle  Description: State effect of exercise on blood glucose levels. Outcome: Progressing Towards Goal  Goal: *Developing strategies to promote health/change behavior  Description: Define the ABC's of diabetes; identify appropriate screenings, schedule and personal plan for screenings. Outcome: Progressing Towards Goal  Goal: *Using medications safely  Description: State effect of diabetes medications on diabetes; name diabetes medication taking, action and side effects. Outcome: Progressing Towards Goal  Goal: *Monitoring blood glucose, interpreting and using results  Description: Identify recommended blood glucose targets  and personal targets. Outcome: Progressing Towards Goal  Goal: *Prevention, detection, treatment of acute complications  Description: List symptoms of hyper- and hypoglycemia; describe how to treat low blood sugar and actions for lowering  high blood glucose level. Outcome: Progressing Towards Goal  Goal: *Prevention, detection and treatment of chronic complications  Description: Define the natural course of diabetes and describe the relationship of blood glucose levels to long term complications of diabetes.   Outcome: Progressing Towards Goal  Goal: *Developing strategies to address psychosocial issues  Description: Describe feelings about living with diabetes; identify support needed and support network  Outcome: Progressing Towards Goal     Problem: Pressure Injury - Risk of  Goal: *Prevention of pressure injury  Description: Document Viktor Scale and appropriate interventions in the flowsheet. Outcome: Progressing Towards Goal  Note: Pressure Injury Interventions: Activity Interventions: Increase time out of bed    Nutrition Interventions: Document food/fluid/supplement intake    Friction and Shear Interventions: HOB 30 degrees or less    Problem: Patient Education: Go to Patient Education Activity  Goal: Patient/Family Education  Outcome: Progressing Towards Goal     Problem: Falls - Risk of  Goal: *Absence of Falls  Description: Document Ricarda Fall Risk and appropriate interventions in the flowsheet.   Outcome: Progressing Towards Goal  Note: Fall Risk Interventions:  Mobility Interventions: Bed/chair exit alarm    Medication Interventions: Patient to call before getting OOB, Teach patient to arise slowly    Elimination Interventions: Patient to call for help with toileting needs    Problem: Patient Education: Go to Patient Education Activity  Goal: Patient/Family Education  Outcome: Progressing Towards Goal     Problem: Patient Education: Go to Patient Education Activity  Goal: Patient/Family Education  Outcome: Progressing Towards Goal     Problem: Patient Education: Go to Patient Education Activity  Goal: Patient/Family Education  Outcome: Progressing Towards Goal     Problem: Diabetes Maintenance:Admission  Goal: *Blood glucose 80 to 180 mg/dl  Outcome: Progressing Towards Goal

## 2020-03-31 NOTE — H&P
Intern Admission History and Physical  [facility]      Patient: Madison Mason MRN: [id number]  [facility]: [id number]    YOB: 1960  Age: 60 y.o. Sex: female      Admission Date: 3/30/2020       HPI:     Madison Mason is a 60 y.o. female with PMH COPD on home O2, IDDM2, HTN, HFpEF, SHERRIE on CPAP, GERD, allergic rhinitis, now presenting with complaint of Worsening shortness of breath. Still on pred taper from last hospitalization, taking 40 mg daily. Got much worse SOB last night. Took duonebs and on trellegy at home both gave very temporary relief. Endorses some allergic rhinitis symptoms that are usual for her. Denies any new CP, change in cough, GI symptoms, fevers/chills, urinary symptoms, sick contacts or recent travel. Says she has been on \"lock down\" for over a month.      ED Course (See objective for values/interpretations):  Labs obtained: UA, CMP, CBC, D-Dimer, ABG,   Medications administered: Mag, duo-neb, lasix  Imaging obtained: CXR, EKG    Review of Systems: Positive in bold  General ROS: negative for  - chills, fever, night sweats, weight gain and weight loss  Psychological ROS: negative for - anxiety and depression  Ophthalmic ROS: negative for - blurry vision, decreased vision or loss of vision  ENT ROS: negative for - headaches, hearing change or visual changes  Hematological and Lymphatic ROS: negative for - bruising, jaundice  Respiratory ROS: negative for - cough, hemoptysis, shortness of breath, orthopnea,   Cardiovascular ROS: negative for - chest pain, dyspnea on exertion, edema, loss of consciousness, or palpitations   Gastrointestinal ROS: negative for - abdominal pain, blood in stools, change in stools, constipation, diarrhea, hematemesis, melena, nausea/vomiting or swallowing difficulty/pain  Genito-Urinary ROS: negative for - dysuria, hematuria or urinary frequency/urgency  Musculoskeletal ROS: negative for - joint pain, joint swelling or muscle pain  Neurological ROS: negative for - dizziness, headaches, numbness/tingling or weakness  Dermatological ROS: negative for - rash or skin lesion changes    Past Medical History:   Diagnosis Date    Asthma     Chronic lung disease     COPD     Cystocele, midline     Diabetes mellitus ([facility] [address]     GERD (gastroesophageal reflux disease)     Hidradenitis suppurativa     Hyperlipidemia     Hypertension     SHERRIE on CPAP     CPAP    Stress incontinence        Past Surgical History:   Procedure Laterality Date    BREAST SURGERY PROCEDURE UNLISTED      Right breast benign tumor removal    HX APPENDECTOMY      HX CHOLECYSTECTOMY      HX DILATION AND CURETTAGE      Dysfunctional uterine bleeding, thought 2/2 fibroids    HX TUBAL LIGATION         Family History   Problem Relation Age of Onset    Hypertension Mother     Stroke Mother     Breast Cancer Mother         Bilateral mastectomies    Cancer Mother         ovarian and breast    Heart Failure Mother     Heart Attack Father         2011    Heart Surgery Father         CABG    Heart Failure Father     COPD Sister         Heavy smoker    Cancer Sister         ovarian    Heart Failure Sister     Lung Disease Sister     Asthma Child     Cancer Maternal Aunt         pancreatic    Cancer Maternal Grandfather         stomach       Social History     Socioeconomic History    Marital status: LEGALLY      Spouse name: Not on file    Number of children: Not on file    Years of education: Not on file    Highest education level: Not on file   Tobacco Use    Smoking status: Former Smoker     Packs/day: 1.00     Years: 30.00     Pack years: 30.00     Types: Cigarettes     Start date: 1966     Last attempt to quit: 2006     Years since quittin.5    Smokeless tobacco: Never Used    Tobacco comment: 1-1.5 packs per day   Substance and Sexual Activity    Alcohol use: No    Drug use: No    Sexual activity: Never Allergies   Allergen Reactions    Ancef [Cefazolin] Hives    Contrast Agent [Iodine] Anaphylaxis, Shortness of Breath and Swelling     Needs pre-medication for IV contrast with Benadryl, Solu-Medrol    Fish Containing Products Anaphylaxis     Pt states she had a severe allergic reaction at 7 y/o.  Statins-Hmg-Coa Reductase Inhibitors Myalgia    Metformin Other (comments)     Abdominal pain, diarrhea.  Codeine Other (comments)     Altered mental status       Prior to Admission Medications   Prescriptions Last Dose Informant Patient Reported? Taking? NOVOLOG FLEXPEN U-100 INSULIN 100 unit/mL inpn 3/30/2020 at Unknown time Self No Yes   Sig: Continue home Sliding scale insulin as prior to admission   Patient taking differently: 1 Units by SubCUTAneous route three (3) times daily. If BG <100=0u  101-150=5u  151-250=8u  251-300=12u  >300 call MD     OXYGEN-AIR DELIVERY SYSTEMS 3/30/2020 at Unknown time  Yes Yes   Si L by Nasal route continuous. [facility]   PROLENSA 0.07 % ophthalmic solution 3/30/2020 at Unknown time  Yes Yes   Sig: Administer 1 Drop to right eye daily. albuterol sulfate (PROVENTIL;VENTOLIN) 2.5 mg/0.5 mL nebu nebulizer solution 3/30/2020 at Unknown time  No Yes   Si.5 mL by Nebulization route four (4) times daily as needed for Wheezing. To be used with HyperSal nebulizer solution. ICD code: COPD J44.1   albuterol sulfate 90 mcg/actuation aepb 3/30/2020 at Unknown time  No Yes   Sig: Take 1 Puff by inhalation every four (4) hours. Patient taking differently: Take 1 Puff by inhalation every four (4) hours as needed. aspirin delayed-release 81 mg tablet 3/30/2020 at Unknown time  Yes Yes   Sig: Take 81 mg by mouth daily. fluticasone propion-salmeterol (ADVAIR HFA) 278-62 mcg/actuation inhaler 3/30/2020 at Unknown time  Yes Yes   Sig: Take 2 Puffs by inhalation two (2) times a day.    fluticasone propionate (FLONASE ALLERGY RELIEF) 50 mcg/actuation nasal spray 3/30/2020 at Unknown time  Yes Yes   Si Sprays by Both Nostrils route daily. hydroCHLOROthiazide (HYDRODIURIL) 12.5 mg tablet 3/30/2020 at Unknown time  Yes Yes   Sig: Take 12.5 mg by mouth daily. insulin glargine (LANTUS,BASAGLAR) 100 unit/mL (3 mL) inpn 3/30/2020 at Unknown time  No Yes   Si Units by SubCUTAneous route nightly. lactobacillus sp. 50 billion cpu (BIO-How do you roll? PLUS) 50 billion cell -375 mg cap capsule 3/30/2020 at Unknown time  No Yes   Sig: Take 1 Cap by mouth daily. lisinopril (PRINIVIL, ZESTRIL) 20 mg tablet 3/30/2020 at Unknown time  Yes Yes   Sig: Take 20 mg by mouth two (2) times a day. loratadine (CLARITIN) 10 mg tablet 3/30/2020 at Unknown time  Yes Yes   Sig: Take 10 mg by mouth daily as needed for Allergies. montelukast (SINGULAIR) 10 mg tablet 3/30/2020 at Unknown time  Yes Yes   Sig: Take 10 mg by mouth nightly. omeprazole (PRILOSEC) 40 mg capsule 3/30/2020 at Unknown time  Yes Yes   Sig: Take 40 mg by mouth daily. Indications: gastroesophageal reflux disease   predniSONE (DELTASONE) 10 mg tablet 3/30/2020 at Unknown time  No Yes   Sig: Take 60 mg by mouth daily for 4 days, THEN 40 mg daily for 4 days, THEN 20 mg daily for 4 days, THEN 10 mg daily for 4 days. Indications: worsening chronic obstructive pulmonary disease   predniSONE (DELTASONE) 10 mg tablet 3/30/2020 at Unknown time  No Yes   Sig: Take 10 mg by mouth every other day. simethicone (GAS-X) 125 mg capsule 3/30/2020 at Unknown time  Yes Yes   Sig: Take 125 mg by mouth four (4) times daily as needed for Flatulence. tiotropium bromide (SPIRIVA RESPIMAT) 2.5 mcg/actuation inhaler 3/30/2020 at Unknown time  Yes Yes   Sig: Take 2 Puffs by inhalation daily.       Facility-Administered Medications: None       Physical Exam:     Patient Vitals for the past 24 hrs:   Temp Pulse Resp BP SpO2   20 -- -- -- -- 99 %   20 -- 83 24 168/79 96 %   20 -- 82 16 169/81 96 %   20 -- 83 (!) 53 142/71 98 %   03/30/20 1845 -- 83 25 152/63 97 %   03/30/20 1830 -- 84 23 171/79 98 %   03/30/20 1815 -- 89 15 193/84 98 %   03/30/20 1812 97.2 °F (36.2 °C) -- -- -- --   03/30/20 1745 -- 89 27 191/77 98 %       Physical Exam:   General:  AAOx3, In some respiratory distress   HEENT: Conjunctiva pink, sclera anicteric. PERRL. EOMI. Moist mucous membranes. No other gross abnormalities present. CV:  RRR, no murmurs. No visible pulsations or thrills. RESP:  Increased work of breathing. Lungs with diffuse wheezes and poor air movement throughout, no crackles. Equal expansion bilaterally. ABD:  Soft, nontender, nondistended. BS (+). No hepatosplenomegaly. No suprapubic tenderness. MS:  No joint deformity or instability. No atrophy. Neuro:  CN II-XII grossly intact. 5/5 strength bilateral upper extremities and lower extremities. Ext:  No edema. 2+ radial and dp pulses bilaterally. Skin:  No rashes, lesions, or ulcers. Good turgor. Chemistry Recent Labs     03/30/20  1802   *      K 4.3      CO2 26   BUN 16   CREA 0.94   CA 9.6   AGAP 9   BUCR 17   AP 90   TP 7.6   ALB 3.8   GLOB 3.8   AGRAT 1.0        CBC w/Diff Recent Labs     03/30/20  1802   WBC 7.4   RBC 4.36   HGB 13.0   HCT 37.8      GRANS 84*   LYMPH 11*   EOS 1        Liver Enzymes Protein, total   Date Value Ref Range Status   03/30/2020 7.6 6.4 - 8.2 g/dL Final     Albumin   Date Value Ref Range Status   03/30/2020 3.8 3.4 - 5.0 g/dL Final     Globulin   Date Value Ref Range Status   03/30/2020 3.8 2.0 - 4.0 g/dL Final     A-G Ratio   Date Value Ref Range Status   03/30/2020 1.0 0.8 - 1.7   Final     AST (SGOT)   Date Value Ref Range Status   03/30/2020 32 10 - 38 U/L Final     Alk.  phosphatase   Date Value Ref Range Status   03/30/2020 90 45 - 117 U/L Final     Recent Labs     03/30/20  1802   TP 7.6   ALB 3.8   GLOB 3.8   AGRAT 1.0   SGOT 32   AP 90        Lactic Acid Lactic acid   Date Value Ref Range Status 09/04/2019 2.0 0.4 - 2.0 MMOL/L Final     No results for input(s): LAC in the last 72 hours. BNP No results found for: BNP, BNPP, XBNPT     Cardiac Enzymes Lab Results   Component Value Date/Time    TROIQ <0.02 03/30/2020 06:02 PM        Coagulation No results for input(s): PTP, INR, APTT, INREXT in the last 72 hours. Thyroid  Lab Results   Component Value Date/Time    TSH 2.76 09/18/2016 02:20 PM          Lipid Panel Lab Results   Component Value Date/Time    Cholesterol, total 220 (H) 11/20/2012 03:22 AM    HDL Cholesterol 62 (H) 11/20/2012 03:22 AM    LDL, calculated 144.6 (H) 11/20/2012 03:22 AM    VLDL, calculated 13.4 11/20/2012 03:22 AM    Triglyceride 67 11/20/2012 03:22 AM    CHOL/HDL Ratio 3.5 11/20/2012 03:22 AM        ABG Recent Labs     03/30/20  1828   PHI 7.364   PCO2I 47.7*   PO2I 241*   HCO3I 27.4*   FIO2I 50        Urinalysis Lab Results   Component Value Date/Time    Color YELLOW 03/30/2020 06:02 PM    Appearance CLEAR 03/30/2020 06:02 PM    Specific gravity 1.025 03/30/2020 06:02 PM    pH (UA) 5.0 03/30/2020 06:02 PM    Protein TRACE (A) 03/30/2020 06:02 PM    Glucose >1,000 (A) 03/30/2020 06:02 PM    Ketone TRACE (A) 03/30/2020 06:02 PM    Bilirubin NEGATIVE  03/30/2020 06:02 PM    Urobilinogen 0.2 03/30/2020 06:02 PM    Nitrites NEGATIVE  03/30/2020 06:02 PM    Leukocyte Esterase NEGATIVE  03/30/2020 06:02 PM    Epithelial cells 4+ 03/30/2020 06:02 PM    Bacteria 3+ (A) 03/30/2020 06:02 PM    WBC 0 to 2 03/30/2020 06:02 PM    RBC NONE 03/30/2020 06:02 PM        Micro No results for input(s): SDES, CULT in the last 72 hours. No results for input(s): CULT in the last 72 hours. Imaging:  XR (Most Recent). Results from [place] encounter on 03/30/20   XR CHEST PORT    Narrative Portable Frontal Chest.    CPT CODE: 18668, 90838    CLINICAL HISTORY: Shortness of breath. TECHNIQUE: Single frontal view of the chest, obtained portably.     COMPARISONS: 3/23/2020. FINDINGS: Study obtained in lordotic projection. Few stable linear densities at the lung bases. The lungs are otherwise clear. The cardiomediastinal silhouette is unremarkable. Pulmonary vascularity is  normal.  There are no effusions. Impression IMPRESSION:    No acute disease. Stable scarring at the lung bases. CT (Most Recent) Results from Hospital Encounter encounter on 07/14/19   CT ABD PELV WO CONT    Narrative HISTORY: Right-sided abdominal pain since this morning. [facility] EXAM: CT abdomen and pelvis. TECHNIQUE: Spiral images of the abdomen and pelvis were performed according to  routine protocol without intravenous contrast. Coronal and sagittal  reconstructions were provided for evaluation. Oral contrast  was not given. COMPARISON: None. All CT scans at this facility are performed using dose optimization technique as  appropriate to a performed exam, to include automated exposure control,  adjustment of the mA and/or kV according to patient size (including appropriate  matching for site-specific examinations), or use of iterative reconstruction  technique. ABDOMEN FINDINGS: Limited evaluation of abdomen and pelvis given lack of oral  and intravenous contrast.    Emphysematous changes noted bilaterally. Liver: Hepatic steatosis. Limited evaluation. Gallbladder: Has been removed. No biliary ductal dilatation. Pancreas: Normal attenuation without mass or ductal dilatation. Spleen: [Unremarkable.]    Adrenal Glands: Unremarkable. Kidneys:     Cortical renal thinning bilaterally. No hydronephrosis or hydroureter. No  nephrolithiasis. Lymph Nodes: No large adenopathy. Aorta:   Grossly unremarkable    PELVIS FINDINGS:     Bowel: Small Bowel: Normal in caliber with normal wall thickness. Large Bowel: Normal in caliber with normal wall thickness.  Limited evaluation of  large bowel given presence of fecal material and lack of oral contrast.    Appendix: Not seen. Bladder: Limited evaluation given its under distention. No ascites or pneumoperitoneum. Umbilical hernia is demonstrated with loop of small bowel as well as omental fat  present within the defect. No evidence of obstruction. Hernia defect measures  approximately series 2 image 61 2.5 cm. Marked body habitus. Bones: No lytic or blastic lesions. Impression IMPRESSION:    1. Umbilical hernia with single loop of small bowel as well as omental fat  present within the defect without evidence of obstruction is measured above. 2.   Medical renal disease. 3. Cholecystectomy. 4. Hepatic steatosis. Follow-up with liver ultrasound and LFTs is recommended. [facility] [place] [facility] No results found for this or any previous visit. EKG No results found for this or any previous visit. Recent Results (from the past 12 hour(s))   CBC WITH AUTOMATED DIFF    Collection Time: 03/30/20  6:02 PM   Result Value Ref Range    WBC 7.4 4.6 - 13.2 K/uL    RBC 4.36 4.20 - 5.30 M/uL    HGB 13.0 12.0 - 16.0 g/dL    HCT 37.8 35.0 - 45.0 %    MCV 86.7 74.0 - 97.0 FL    MCH 29.8 24.0 - 34.0 PG    MCHC 34.4 31.0 - 37.0 g/dL    RDW 13.8 11.6 - 14.5 %    PLATELET 804 482 - 801 K/uL    MPV 9.2 9.2 - 11.8 FL    NEUTROPHILS 84 (H) 40 - 73 %    LYMPHOCYTES 11 (L) 21 - 52 %    MONOCYTES 4 3 - 10 %    EOSINOPHILS 1 0 - 5 %    BASOPHILS 0 0 - 2 %    ABS. NEUTROPHILS 6.2 1.8 - 8.0 K/UL    ABS. LYMPHOCYTES 0.8 (L) 0.9 - 3.6 K/UL    ABS. MONOCYTES 0.3 0.05 - 1.2 K/UL    ABS. EOSINOPHILS 0.1 0.0 - 0.4 K/UL    ABS.  BASOPHILS 0.0 0.0 - 0.1 K/UL    DF AUTOMATED     METABOLIC PANEL, COMPREHENSIVE    Collection Time: 03/30/20  6:02 PM   Result Value Ref Range    Sodium 139 136 - 145 mmol/L    Potassium 4.3 3.5 - 5.5 mmol/L    Chloride 104 100 - 111 mmol/L    CO2 26 21 - 32 mmol/L    Anion gap 9 3.0 - 18 mmol/L    Glucose 320 (H) 74 - 99 mg/dL    BUN 16 7.0 - 18 MG/DL    Creatinine 0.94 0.6 - 1.3 MG/DL BUN/Creatinine ratio 17 12 - 20      GFR est AA >60 >60 ml/min/1.73m2    GFR est non-AA >60 >60 ml/min/1.73m2    Calcium 9.6 8.5 - 10.1 MG/DL    Bilirubin, total 0.5 0.2 - 1.0 MG/DL    ALT (SGPT) 64 (H) 13 - 56 U/L    AST (SGOT) 32 10 - 38 U/L    Alk.  phosphatase 90 45 - 117 U/L    Protein, total 7.6 6.4 - 8.2 g/dL    Albumin 3.8 3.4 - 5.0 g/dL    Globulin 3.8 2.0 - 4.0 g/dL    A-G Ratio 1.0 0.8 - 1.7     NT-PRO BNP    Collection Time: 03/30/20  6:02 PM   Result Value Ref Range    NT pro- 0 - 900 PG/ML   TROPONIN I    Collection Time: 03/30/20  6:02 PM   Result Value Ref Range    Troponin-I, QT <0.02 0.0 - 0.045 NG/ML   URINALYSIS W/ RFLX MICROSCOPIC    Collection Time: 03/30/20  6:02 PM   Result Value Ref Range    Color YELLOW      Appearance CLEAR      Specific gravity 1.025 1.005 - 1.030      pH (UA) 5.0 5.0 - 8.0      Protein TRACE (A) NEG mg/dL    Glucose >1,000 (A) NEG mg/dL    Ketone TRACE (A) NEG mg/dL    Bilirubin NEGATIVE  NEG      Blood NEGATIVE  NEG      Urobilinogen 0.2 0.2 - 1.0 EU/dL    Nitrites NEGATIVE  NEG      Leukocyte Esterase NEGATIVE  NEG     D DIMER    Collection Time: 03/30/20  6:02 PM   Result Value Ref Range    D DIMER 0.40 <0.46 ug/ml(FEU)   URINE MICROSCOPIC ONLY    Collection Time: 03/30/20  6:02 PM   Result Value Ref Range    WBC 0 to 2 0 - 4 /hpf    RBC NONE 0 - 5 /hpf    Epithelial cells 4+ 0 - 5 /lpf    Bacteria 3+ (A) NEG /hpf   EKG, 12 LEAD, INITIAL    Collection Time: 03/30/20  6:04 PM   Result Value Ref Range    Ventricular Rate 80 BPM    Atrial Rate 80 BPM    P-R Interval 150 ms    QRS Duration 84 ms    Q-T Interval 372 ms    QTC Calculation (Bezet) 429 ms    Calculated P Axis 41 degrees    Calculated R Axis 22 degrees    Calculated T Axis 54 degrees    Diagnosis       Normal sinus rhythm  Normal ECG  When compared with ECG of 13-MAR-2020 22:11,  No significant change was found     POC G3    Collection Time: 03/30/20  6:28 PM   Result Value Ref Range    Device: Non rebreather      Flow rate (POC) 10 L/M    FIO2 (POC) 50 %    pH (POC) 7.364 7.35 - 7.45      pCO2 (POC) 47.7 (H) 35.0 - 45.0 MMHG    pO2 (POC) 241 (H) 80 - 100 MMHG    HCO3 (POC) 27.4 (H) 22 - 26 MMOL/L    sO2 (POC) 100 (H) 92 - 97 %    Base excess (POC) 1 mmol/L    Allens test (POC) YES      Total resp. rate 22      Site RIGHT RADIAL      Patient temp. 97.4      Specimen type (POC) ARTERIAL      Performed by Meron Needs        Assessment/Plan:   Carmelita Aguilar a 60 y.o. female with PMH of COPD on home O2, IDDM2, HTN, HFpEF, SHERRIE on CPAP, GERD, allergic rhinitis, now admitted for COPD exacerbation.      COPD exacerbation  Likely 2/2 viral infx vs pneumonia vs seasonal allergies. Has hx of asthma/COPD on 2L home 02 and sleeps with trezeyady. Followed by Dr. Sonja Natarajan in OP. Per last pulm note pt may have environmental triggers. Previously hospitalized 4x in 2019 for COPD exacerbations, This is third admission this month, most recent 3/23-3/25. Symptoms started to worsen last night while still on 40 pred daily from last admission. No sick contacts, no GI symptoms, no fevers. ABG showed primary respiratory acidosis consistent with chronicity of COPD. D-dimer, Cardiac enzymes and BNP WNL. In ED, afebrile, clear CXR, normal WBCs. Satting 92-97% on 10 L bag valve mask. Speaking in very short sentences with significantly increased respiratory effort. In ED given 1 duoneb, mag, lasix and placed on bipap. Plan  -admit to stepdown for BIPAP  -oxygen as needed, goal 88-92%  -BIPAP  -NPO while on BIPAP  -CBC, BMP daily  -Duonebs q 1-4 PRN  -IV solumedrol 60 BID 5 days  -Levaquin 750 daily  -mIVF D5 1/2NS @ 125 while NPO  -vitals per routine  -consult to CM/PT/OT  -Fall precautions  -Continue home Singulair and loratadine  -Home advair>arformotorol+budesonide   -Consider prolonged steroid taper  -AM Pulm consult for consideration of biologic mentioned last hospitalization and other ideas?  (Sees Dr Denise Marsh)     SHERRIE/pulm htn  Intolerant to Cpap. On trelegy nightly at home  - Continue bipap qhs even after stable off bipap during the day     Insuline dependent Type 2 Diabetes  Home lantus 35u and novolog SSI.  on admission. Pt will be NPO while on BIPAP but also starting steroids and pt has been poorly controlled outpatient.  -Lantus 25U and likely increase above home dose 35 once eating  -SSI  -poc glucose achs     Hypertension, HFpEF  Stable. Echo 7/7/18 EF 66-70%, LV mild concentric hypertrophy, No left regional wall motion abnorm. SBP in ED elevated 190s improved to 140's-160's.  -continue home lisinopril 20mg bid, HCTZ 12.5mg daily     GERD  Takes omeprazole 40mg qD and pepcid for breakthrough symptoms.   -continue home regimen      Allergic Rhinitis  -home flonase, singulair 10mg      Diet: npo, diabetic diet when off bipap  DVT Prophylaxis: [facility]  Code Status: Full Code  [place] [address], daughter, [phone]     Disposition and anticipated LOS:  3-4 Noah Pike MD , PGY-1   [address] [place]   Intern Pager: [phone]   March 30, 2020, 8:54 PM          Senior Addendum to History and Physical  [facility]        Patient: Nate Hampton MRN: [id number]  CSN: [id number]    YOB: 1960  Age: 60 y.o. Sex: female       DOA: 3/30/2020       HPI:      Nate Hampton is a 60 y.o. female with PMH  of COPD on home O2, IDDM2, HTN, HFpEF, SHERRIE on CPAP, GERD, allergic rhinitis, now admitted with increased shortness of breath, dry cough.      HPI, ROS, PMH, PSH, Family Hx, Social Hx, Home medications, and allergies as above in intern H&P. Which has been reviewed in full. Additional comments to subjective history include:     Physical Exam:      Physical Exam:  General:  Alert and Responsive and in No acute distress  HEENT: Conjunctiva pink, sclera anicteric. PERRL. EOMI. Pharynx moist, nonerythematous. Moist mucous membranes. CV:  RRR, no murmurs. PMI not displaced. No visible pulsations or thrills. No carotid bruits. RESP:  Labored breathing. Lungs clear to auscultation. no wheeze, rales, or rhonchi. Equal expansion bilaterally. ABD:  Soft, nontender, nondistended. Normoactive bowel sounds. MS:  No joint deformity or instability. No atrophy. Neuro:  5/5 strength bilateral upper extremities and lower extremities. CN II-XII intact. A+Ox3. Ext:  No edema. 2+ radial and dp pulses bilaterally. Skin:  No rashes, lesions, or ulcers. Good turgor.     Labs and imaging reviewed      Assessment/Plan:   60 y.o. female with PMH of COPD on home O2, IDDM2, HTN, HFpEF, SHERRIE on CPAP, GERD, allergic rhinitis, now admitted with increased shortness of breath, dry cough.      Acute on chronic hypoxic respiratory failure likely secondary to COPD Exacerbation - Patient is on Triligy at night time and intermittent use of 2 L NC at home. CXR unremarkable except for chronic scarring. ABG showed primary respiratory acidosis consistent with chronicity of COPD. Patient with COPD GOLD D with asthma component, steroid dependent. Properly prescribed ICS + LABA based on GOLD criteria. Just discharged from hospital on 3/25 but stated she became worse last night. Does not appear infectious.  Currently no URI symptoms except for dry chronic cough.   - Levaquin 750mg QD - reviewed available data on up to date at higher risk for pseudomonas infection/colonization given recent hospitalization and abx use and levaquin is an appropriate abx for this indication   - Scheduled duonebs Q4H  - Solumedrol 60mg BID for now, suggest a slow taper.   - Bipap at night   - Goal SpO2 should be between 88-92% in this patient with severe COPD  - Continue home singular  - Sub home advair per pharmacy recs  - Daily CBC, BMP   - PT/OT/CM  - Consider pulm consult due to outpatient office      For additional problem list, assessment, and plan see intern note     Ric Chavez,   3/30/2020, 8:35 PM

## 2020-04-01 LAB
ANION GAP SERPL CALC-SCNC: 8 MMOL/L (ref 3–18)
BASOPHILS # BLD: 0 K/UL (ref 0–0.1)
BASOPHILS NFR BLD: 0 % (ref 0–2)
BUN SERPL-MCNC: 21 MG/DL (ref 7–18)
BUN/CREAT SERPL: 21 (ref 12–20)
CALCIUM SERPL-MCNC: 9.1 MG/DL (ref 8.5–10.1)
CHLORIDE SERPL-SCNC: 99 MMOL/L (ref 100–111)
CO2 SERPL-SCNC: 28 MMOL/L (ref 21–32)
CREAT SERPL-MCNC: 0.98 MG/DL (ref 0.6–1.3)
DIFFERENTIAL METHOD BLD: ABNORMAL
EOSINOPHIL # BLD: 0 K/UL (ref 0–0.4)
EOSINOPHIL NFR BLD: 0 % (ref 0–5)
ERYTHROCYTE [DISTWIDTH] IN BLOOD BY AUTOMATED COUNT: 13.9 % (ref 11.6–14.5)
GLUCOSE BLD STRIP.AUTO-MCNC: 256 MG/DL (ref 70–110)
GLUCOSE BLD STRIP.AUTO-MCNC: 300 MG/DL (ref 70–110)
GLUCOSE BLD STRIP.AUTO-MCNC: 319 MG/DL (ref 70–110)
GLUCOSE BLD STRIP.AUTO-MCNC: 365 MG/DL (ref 70–110)
GLUCOSE SERPL-MCNC: 304 MG/DL (ref 74–99)
HCT VFR BLD AUTO: 36.4 % (ref 35–45)
HGB BLD-MCNC: 12.3 G/DL (ref 12–16)
LYMPHOCYTES # BLD: 0.9 K/UL (ref 0.9–3.6)
LYMPHOCYTES NFR BLD: 9 % (ref 21–52)
MCH RBC QN AUTO: 29.9 PG (ref 24–34)
MCHC RBC AUTO-ENTMCNC: 33.8 G/DL (ref 31–37)
MCV RBC AUTO: 88.6 FL (ref 74–97)
MONOCYTES # BLD: 0.4 K/UL (ref 0.05–1.2)
MONOCYTES NFR BLD: 4 % (ref 3–10)
NEUTS SEG # BLD: 8.9 K/UL (ref 1.8–8)
NEUTS SEG NFR BLD: 87 % (ref 40–73)
PLATELET # BLD AUTO: 344 K/UL (ref 135–420)
PMV BLD AUTO: 9.3 FL (ref 9.2–11.8)
POTASSIUM SERPL-SCNC: 4.2 MMOL/L (ref 3.5–5.5)
RBC # BLD AUTO: 4.11 M/UL (ref 4.2–5.3)
SODIUM SERPL-SCNC: 135 MMOL/L (ref 136–145)
WBC # BLD AUTO: 10.1 K/UL (ref 4.6–13.2)

## 2020-04-01 PROCEDURE — 80048 BASIC METABOLIC PNL TOTAL CA: CPT

## 2020-04-01 PROCEDURE — 36415 COLL VENOUS BLD VENIPUNCTURE: CPT

## 2020-04-01 PROCEDURE — 65660000000 HC RM CCU STEPDOWN

## 2020-04-01 PROCEDURE — 85025 COMPLETE CBC W/AUTO DIFF WBC: CPT

## 2020-04-01 PROCEDURE — 74011250637 HC RX REV CODE- 250/637: Performed by: STUDENT IN AN ORGANIZED HEALTH CARE EDUCATION/TRAINING PROGRAM

## 2020-04-01 PROCEDURE — 77010033678 HC OXYGEN DAILY

## 2020-04-01 PROCEDURE — 97116 GAIT TRAINING THERAPY: CPT

## 2020-04-01 PROCEDURE — 82962 GLUCOSE BLOOD TEST: CPT

## 2020-04-01 PROCEDURE — 94760 N-INVAS EAR/PLS OXIMETRY 1: CPT

## 2020-04-01 PROCEDURE — 74011636637 HC RX REV CODE- 636/637: Performed by: STUDENT IN AN ORGANIZED HEALTH CARE EDUCATION/TRAINING PROGRAM

## 2020-04-01 PROCEDURE — 94761 N-INVAS EAR/PLS OXIMETRY MLT: CPT

## 2020-04-01 PROCEDURE — 74011250636 HC RX REV CODE- 250/636: Performed by: INTERNAL MEDICINE

## 2020-04-01 PROCEDURE — 94640 AIRWAY INHALATION TREATMENT: CPT

## 2020-04-01 PROCEDURE — 74011250636 HC RX REV CODE- 250/636: Performed by: STUDENT IN AN ORGANIZED HEALTH CARE EDUCATION/TRAINING PROGRAM

## 2020-04-01 PROCEDURE — 74011000250 HC RX REV CODE- 250: Performed by: STUDENT IN AN ORGANIZED HEALTH CARE EDUCATION/TRAINING PROGRAM

## 2020-04-01 RX ORDER — INSULIN GLARGINE 100 [IU]/ML
40 INJECTION, SOLUTION SUBCUTANEOUS
Status: DISCONTINUED | OUTPATIENT
Start: 2020-04-01 | End: 2020-04-02 | Stop reason: HOSPADM

## 2020-04-01 RX ORDER — AZITHROMYCIN 250 MG/1
250 TABLET, FILM COATED ORAL DAILY
Status: DISCONTINUED | OUTPATIENT
Start: 2020-04-02 | End: 2020-04-02 | Stop reason: HOSPADM

## 2020-04-01 RX ORDER — INSULIN GLARGINE 100 [IU]/ML
5 INJECTION, SOLUTION SUBCUTANEOUS ONCE
Status: COMPLETED | OUTPATIENT
Start: 2020-04-01 | End: 2020-04-01

## 2020-04-01 RX ORDER — INSULIN LISPRO 100 [IU]/ML
4 INJECTION, SOLUTION INTRAVENOUS; SUBCUTANEOUS
Status: DISCONTINUED | OUTPATIENT
Start: 2020-04-01 | End: 2020-04-02 | Stop reason: HOSPADM

## 2020-04-01 RX ORDER — PREDNISONE 20 MG/1
80 TABLET ORAL
Status: DISCONTINUED | OUTPATIENT
Start: 2020-04-02 | End: 2020-04-01

## 2020-04-01 RX ORDER — PREDNISONE 20 MG/1
60 TABLET ORAL
Status: DISCONTINUED | OUTPATIENT
Start: 2020-04-02 | End: 2020-04-02 | Stop reason: HOSPADM

## 2020-04-01 RX ADMIN — IPRATROPIUM BROMIDE AND ALBUTEROL SULFATE 3 ML: .5; 3 SOLUTION RESPIRATORY (INHALATION) at 00:09

## 2020-04-01 RX ADMIN — PANTOPRAZOLE SODIUM 40 MG: 40 TABLET, DELAYED RELEASE ORAL at 08:31

## 2020-04-01 RX ADMIN — HEPARIN SODIUM 5000 UNITS: 5000 INJECTION INTRAVENOUS; SUBCUTANEOUS at 22:21

## 2020-04-01 RX ADMIN — INSULIN GLARGINE 5 UNITS: 100 INJECTION, SOLUTION SUBCUTANEOUS at 12:02

## 2020-04-01 RX ADMIN — IPRATROPIUM BROMIDE AND ALBUTEROL SULFATE 3 ML: .5; 3 SOLUTION RESPIRATORY (INHALATION) at 08:49

## 2020-04-01 RX ADMIN — INSULIN LISPRO 12 UNITS: 100 INJECTION, SOLUTION INTRAVENOUS; SUBCUTANEOUS at 08:33

## 2020-04-01 RX ADMIN — IPRATROPIUM BROMIDE AND ALBUTEROL SULFATE 3 ML: .5; 3 SOLUTION RESPIRATORY (INHALATION) at 19:40

## 2020-04-01 RX ADMIN — IPRATROPIUM BROMIDE AND ALBUTEROL SULFATE 3 ML: .5; 3 SOLUTION RESPIRATORY (INHALATION) at 23:21

## 2020-04-01 RX ADMIN — INSULIN LISPRO 12 UNITS: 100 INJECTION, SOLUTION INTRAVENOUS; SUBCUTANEOUS at 16:39

## 2020-04-01 RX ADMIN — BUDESONIDE 1000 MCG: 0.5 INHALANT RESPIRATORY (INHALATION) at 08:49

## 2020-04-01 RX ADMIN — MONTELUKAST 10 MG: 10 TABLET, FILM COATED ORAL at 22:25

## 2020-04-01 RX ADMIN — INSULIN LISPRO 15 UNITS: 100 INJECTION, SOLUTION INTRAVENOUS; SUBCUTANEOUS at 12:04

## 2020-04-01 RX ADMIN — INSULIN LISPRO 4 UNITS: 100 INJECTION, SOLUTION INTRAVENOUS; SUBCUTANEOUS at 12:02

## 2020-04-01 RX ADMIN — LISINOPRIL 20 MG: 20 TABLET ORAL at 08:31

## 2020-04-01 RX ADMIN — ASPIRIN 81 MG: 81 TABLET, COATED ORAL at 08:31

## 2020-04-01 RX ADMIN — INSULIN LISPRO 9 UNITS: 100 INJECTION, SOLUTION INTRAVENOUS; SUBCUTANEOUS at 22:21

## 2020-04-01 RX ADMIN — METHYLPREDNISOLONE SODIUM SUCCINATE 60 MG: 40 INJECTION, POWDER, FOR SOLUTION INTRAMUSCULAR; INTRAVENOUS at 08:31

## 2020-04-01 RX ADMIN — BUDESONIDE 1000 MCG: 0.5 INHALANT RESPIRATORY (INHALATION) at 19:40

## 2020-04-01 RX ADMIN — HEPARIN SODIUM 5000 UNITS: 5000 INJECTION INTRAVENOUS; SUBCUTANEOUS at 12:02

## 2020-04-01 RX ADMIN — ARFORMOTEROL TARTRATE 15 MCG: 15 SOLUTION RESPIRATORY (INHALATION) at 19:40

## 2020-04-01 RX ADMIN — Medication 1 CAPSULE: at 08:31

## 2020-04-01 RX ADMIN — INSULIN GLARGINE 40 UNITS: 100 INJECTION, SOLUTION SUBCUTANEOUS at 22:20

## 2020-04-01 RX ADMIN — HEPARIN SODIUM 5000 UNITS: 5000 INJECTION INTRAVENOUS; SUBCUTANEOUS at 04:51

## 2020-04-01 RX ADMIN — HYDROCHLOROTHIAZIDE 12.5 MG: 25 TABLET ORAL at 08:31

## 2020-04-01 RX ADMIN — FLUTICASONE PROPIONATE 2 SPRAY: 50 SPRAY, METERED NASAL at 08:30

## 2020-04-01 RX ADMIN — INSULIN LISPRO 4 UNITS: 100 INJECTION, SOLUTION INTRAVENOUS; SUBCUTANEOUS at 16:40

## 2020-04-01 RX ADMIN — ARFORMOTEROL TARTRATE 15 MCG: 15 SOLUTION RESPIRATORY (INHALATION) at 08:49

## 2020-04-01 RX ADMIN — AZITHROMYCIN MONOHYDRATE 500 MG: 500 INJECTION, POWDER, LYOPHILIZED, FOR SOLUTION INTRAVENOUS at 12:27

## 2020-04-01 NOTE — DIABETES MGMT
Glycemic Control Plan of Care    T2DM with current A1c of  8.2% (3/05/2020). See separate notes, 03/31/2020, for assessment of home diabetes management/education. Home diabetes medications:   Basaglar (lantus) pen insulin 35 units daily at bedtime. Novolog pen sliding scale insulin TID AC. Patient cont on IV solumedrol 60 mg every 12 hours and stated, \"my blood sugar is going to stay high because I am on steroids. \" She report eating meals with with good appetite. POC BG range on 03/31/2020: 285-320. Increased basal lantus insulin dose to 35 units daily. Cont correctional lispro insulin at very resistant dose. Results for Claudy Cordoba (MRN [id number]) as of 4/1/2020 11:36   Ref. Range 4/1/2020 05:21   Glucose Latest Ref Range: 74 - 99 mg/dL 304 (H)     POC BG report on 04/01/2020 at time of review: 300. Discussed with Dr. Evgeny Shane BG values remain above target range and patient remain on steroids and ins rec. Recommendation(s):  1.) obtained order to increase lantus dose to 40 units daily at bedtime today. Patient received 35 units last night. Entered a one time dose of 5 units lantus for today and added meal time lispro insulin 4 units TID AC in addition to correctional lispro insulin. Assessment:  Patient is 60year old with past medical history including type 2 diabetes mellitus, asthma, COPD, GERD, hyperlipidemia, SHERRIE of Cpap, and hyperlipidemia - was admitted on 3/30/2020 with report of worsening shortness of breath. Noted:  COPD exacerbation. T2DM    Most recent blood glucose values:        Current A1C: 8.2% (3/05/2020) which is equivalent to estimated average blood glucose of 189 mg/dL during the past 2-3 months. Current hospital diabetes medications:  Basal lantus insulin 40 units daily at bedtime. Correctional lispro insulin ACHS. Very resistant dose.     Total daily dose insulin requirement previous day: 03/31/2020:  Lantus: 35 units  Lispro: 42 units  TTD insulin: 77 units    Diet: Diabetic consistent carb regular. Goals:  Blood glucose will be within target range of  mg/dL by 04/04/2020.     Education:  _X__  Refer to Diabetes Education Record: 03/31/2020             ___  Education not indicated at this time    Capo Burkett RN [facility]  Pager: [phone]

## 2020-04-01 NOTE — PROGRESS NOTES
Problem: Diabetes Self-Management  Goal: *Disease process and treatment process  Description: Define diabetes and identify own type of diabetes; list 3 options for treating diabetes. Outcome: Progressing Towards Goal  Goal: *Incorporating nutritional management into lifestyle  Description: Describe effect of type, amount and timing of food on blood glucose; list 3 methods for planning meals. Outcome: Progressing Towards Goal  Goal: *Incorporating physical activity into lifestyle  Description: State effect of exercise on blood glucose levels. Outcome: Progressing Towards Goal  Goal: *Developing strategies to promote health/change behavior  Description: Define the ABC's of diabetes; identify appropriate screenings, schedule and personal plan for screenings. Outcome: Progressing Towards Goal  Goal: *Using medications safely  Description: State effect of diabetes medications on diabetes; name diabetes medication taking, action and side effects. Outcome: Progressing Towards Goal  Goal: *Monitoring blood glucose, interpreting and using results  Description: Identify recommended blood glucose targets  and personal targets. Outcome: Progressing Towards Goal  Goal: *Prevention, detection, treatment of acute complications  Description: List symptoms of hyper- and hypoglycemia; describe how to treat low blood sugar and actions for lowering  high blood glucose level. Outcome: Progressing Towards Goal  Goal: *Prevention, detection and treatment of chronic complications  Description: Define the natural course of diabetes and describe the relationship of blood glucose levels to long term complications of diabetes.   Outcome: Progressing Towards Goal  Goal: *Developing strategies to address psychosocial issues  Description: Describe feelings about living with diabetes; identify support needed and support network  Outcome: Progressing Towards Goal  Goal: *Insulin pump training  Outcome: Progressing Towards Goal  Goal: *Sick day guidelines  Outcome: Progressing Towards Goal  Goal: *Patient Specific Goal (EDIT GOAL, INSERT TEXT)  Outcome: Progressing Towards Goal     Problem: Patient Education: Go to Patient Education Activity  Goal: Patient/Family Education  Outcome: Progressing Towards Goal     Problem: Pressure Injury - Risk of  Goal: *Prevention of pressure injury  Description: Document Viktor Scale and appropriate interventions in the flowsheet. Outcome: Progressing Towards Goal  Note: Pressure Injury Interventions: Activity Interventions: Pressure redistribution bed/mattress(bed type), Increase time out of bed         Nutrition Interventions: Document food/fluid/supplement intake, Discuss nutritional consult with provider    Friction and Shear Interventions: Minimize layers                Problem: Patient Education: Go to Patient Education Activity  Goal: Patient/Family Education  Outcome: Progressing Towards Goal     Problem: Falls - Risk of  Goal: *Absence of Falls  Description: Document Starleen Freeze Fall Risk and appropriate interventions in the flowsheet.   Outcome: Progressing Towards Goal  Note: Fall Risk Interventions:  Mobility Interventions: Communicate number of staff needed for ambulation/transfer, Patient to call before getting OOB, PT Consult for mobility concerns         Medication Interventions: Patient to call before getting OOB, Evaluate medications/consider consulting pharmacy, Teach patient to arise slowly    Elimination Interventions: Call light in reach, Patient to call for help with toileting needs, Toilet paper/wipes in reach              Problem: Patient Education: Go to Patient Education Activity  Goal: Patient/Family Education  Outcome: Progressing Towards Goal     Problem: Diabetes Maintenance:Admission  Goal: *Blood glucose 80 to 180 mg/dl  Outcome: Progressing Towards Goal

## 2020-04-01 NOTE — PROGRESS NOTES
Problem: Mobility Impaired (Adult and Pediatric)  Goal: *Acute Goals and Plan of Care (Insert Text)  Description: Physical Therapy Goals  Initiated 3/31/2020 and to be accomplished within 4 day(s)   2. Patient will transfer from bed to chair and chair to bed with modified independence using the least restrictive device. 3.  Patient will perform sit to stand with modified independence. 4.  Patient will ambulate with modified independence for 60 feet with the least restrictive device. 5.  Patient will ascend/descend 4 stairs with unilateral handrail(s) with supervision/set-up. PLOF: Pt reports she is independent with ADLs and functional mobility. Outcome: Progressing Towards Goal     PHYSICAL THERAPY TREATMENT    Patient: Elfego Miranda (13 y.o. female)  Date: 4/1/2020  Diagnosis: COPD exacerbation ([facility] [address] [J44.1]   Chart, physical therapy assessment, plan of care and goals were reviewed. OBJECTIVE/ ASSESSMENT:  Patient found sitting at EOB willing to work with PT. Pt on 3LO2 throughout tx via nasal cannula. Pt ambulated x 2 trials of 15 ft and reports SOB on each trial. Pt found to be at 95% SpO2 post second ambulation trial. Pt was encouraged to continue therex on own. Progression toward goals:  [x]      Improving appropriately and progressing toward goals  []      Improving slowly and progressing toward goals  []      Not making progress toward goals and plan of care will be adjusted     PLAN:  Patient continues to benefit from skilled intervention to address the above impairments. Continue treatment per established plan of care. Discharge Recommendations:  Home Health  Further Equipment Recommendations for Discharge: Pt has all needed equipment     SUBJECTIVE:   Patient stated I have a walker, a shower chair. ..    OBJECTIVE DATA SUMMARY:   Critical Behavior:  Neurologic State: Alert  Orientation Level: Oriented X4  Cognition: Appropriate decision making, Follows commands  Safety/Judgement: Fall prevention  Functional Mobility Training:  Transfers:  Sit to Stand: Supervision  Stand to Sit: Supervision  Balance:  Sitting: Intact  Standing: Impaired; Without support  Standing - Static: Good  Standing - Dynamic : Fair(+)  Ambulation/Gait Training:  Distance (ft): 30 Feet (ft)(15 + 15)  Ambulation - Level of Assistance: Stand-by assistance;Contact guard assistance  Speed/Pam: Slow  Step Length: Left shortened;Right shortened  Interventions: Verbal cues      Pain:  Pain level pre-treatment: Not rated  Pain level post-treatment: Not rated    Activity Tolerance:   Fair    Please refer to the flowsheet for vital signs taken during this treatment.   After treatment:   [x] Patient left in no apparent distress sitting at EOB  [] Patient left in no apparent distress in bed  [x] Call bell left within reach  [] Nursing notified  [] Caregiver present  [] Bed alarm activated  [] SCDs applied    COMMUNICATION/EDUCATION:   [x]         Role of Physical Therapy  []         Fall prevention  []         Bed mobility  [x]         Transfers  [x]         Ambulation / gait  []         Assistive device management  []         Stairs  [x]         Body mechanics  [x]         Position change   [x]         Therapeutic exercise  [x]         Activity pacing / energy conservation  []         Other:      Michaela Maldonado PTA   Time Calculation: 9 mins

## 2020-04-01 NOTE — PROGRESS NOTES
Problem: Diabetes Self-Management  Goal: *Disease process and treatment process  Description: Define diabetes and identify own type of diabetes; list 3 options for treating diabetes. Outcome: Progressing Towards Goal  Goal: *Incorporating nutritional management into lifestyle  Description: Describe effect of type, amount and timing of food on blood glucose; list 3 methods for planning meals. Outcome: Progressing Towards Goal  Goal: *Incorporating physical activity into lifestyle  Description: State effect of exercise on blood glucose levels. Outcome: Progressing Towards Goal  Goal: *Developing strategies to promote health/change behavior  Description: Define the ABC's of diabetes; identify appropriate screenings, schedule and personal plan for screenings. Outcome: Progressing Towards Goal  Goal: *Using medications safely  Description: State effect of diabetes medications on diabetes; name diabetes medication taking, action and side effects. Outcome: Progressing Towards Goal  Goal: *Monitoring blood glucose, interpreting and using results  Description: Identify recommended blood glucose targets  and personal targets. Outcome: Progressing Towards Goal  Goal: *Prevention, detection, treatment of acute complications  Description: List symptoms of hyper- and hypoglycemia; describe how to treat low blood sugar and actions for lowering  high blood glucose level. Outcome: Progressing Towards Goal  Goal: *Prevention, detection and treatment of chronic complications  Description: Define the natural course of diabetes and describe the relationship of blood glucose levels to long term complications of diabetes.   Outcome: Progressing Towards Goal  Goal: *Developing strategies to address psychosocial issues  Description: Describe feelings about living with diabetes; identify support needed and support network  Outcome: Progressing Towards Goal  Goal: *Insulin pump training  Outcome: Progressing Towards Goal  Goal: *Sick day guidelines  Outcome: Progressing Towards Goal  Goal: *Patient Specific Goal (EDIT GOAL, INSERT TEXT)  Outcome: Progressing Towards Goal     Problem: Patient Education: Go to Patient Education Activity  Goal: Patient/Family Education  Outcome: Progressing Towards Goal     Problem: Pressure Injury - Risk of  Goal: *Prevention of pressure injury  Description: Document Viktor Scale and appropriate interventions in the flowsheet. Outcome: Progressing Towards Goal  Note: Pressure Injury Interventions: Activity Interventions: PT/OT evaluation, Increase time out of bed         Nutrition Interventions: Document food/fluid/supplement intake, Discuss nutritional consult with provider    Friction and Shear Interventions: HOB 30 degrees or less                Problem: Patient Education: Go to Patient Education Activity  Goal: Patient/Family Education  Outcome: Progressing Towards Goal     Problem: Falls - Risk of  Goal: *Absence of Falls  Description: Document Ricarda Fall Risk and appropriate interventions in the flowsheet.   Outcome: Progressing Towards Goal  Note: Fall Risk Interventions:  Mobility Interventions: Patient to call before getting OOB         Medication Interventions: Patient to call before getting OOB, Teach patient to arise slowly    Elimination Interventions: Call light in reach, Toilet paper/wipes in reach              Problem: Patient Education: Go to Patient Education Activity  Goal: Patient/Family Education  Outcome: Progressing Towards Goal     Problem: Patient Education: Go to Patient Education Activity  Goal: Patient/Family Education  Outcome: Progressing Towards Goal     Problem: Patient Education: Go to Patient Education Activity  Goal: Patient/Family Education  Outcome: Progressing Towards Goal     Problem: Diabetes Maintenance:Admission  Goal: *Blood glucose 80 to 180 mg/dl  Outcome: Progressing Towards Goal

## 2020-04-01 NOTE — PROGRESS NOTES
Progress Note    Patient: Anyi Griggs MRN: [id number]  SSN: [id number]    YOB: 1960  Age: 60 y.o. Sex: female      Admit Date: 3/30/2020    LOS: 2 days     Subjective:     Pt on BIPAP overnight and on 3L NC when examined at bedside. She stated that she is feeling better and does not have nausea or diarrhea, constipation at this time. Review of Systems   Respiratory: Positive for shortness of breath and wheezing. Negative for cough. Cardiovascular: Negative for chest pain and palpitations. Gastrointestinal: Negative for nausea and vomiting. Objective:     Vitals:    03/31/20 2000 04/01/20 0000 04/01/20 0400 04/01/20 0758   BP: 127/64 139/70 135/64 155/87   Pulse: 79 78 72 77   Resp: 21 24 15 23   Temp: 97.5 °F (36.4 °C) 98.4 °F (36.9 °C) 96.8 °F (36 °C) 96.7 °F (35.9 °C)   SpO2: 96% 95% 100% 97%   Weight:   115.1 kg (253 lb 12.8 oz)         Intake and Output:  Current Shift: No intake/output data recorded. Last three shifts: 03/30 1901 - 04/01 0700  In: 1120 [P.O.:720; I.V.:400]  Out: 8 [Urine:8]    Physical Exam:   General:  AAOx3, On 3L NC this AM  CV:  RRR, no murmurs gallops, or rubs  RESP:  Increased work of breathing. Lungs poor air movement throughout. Decreased wheezes compared to 3/31/20. ABD:  Soft, nontender, nondistended. BS (+). Ext:  Trace bilateral pitting edema. 2+ radial and dp pulses bilaterally. Skin:  No rashes, lesions, or ulcers. Good turgor.     Lab/Data Review:  Recent Results (from the past 24 hour(s))   METABOLIC PANEL, BASIC    Collection Time: 03/31/20 10:19 AM   Result Value Ref Range    Sodium 138 136 - 145 mmol/L    Potassium 4.7 3.5 - 5.5 mmol/L    Chloride 103 100 - 111 mmol/L    CO2 26 21 - 32 mmol/L    Anion gap 9 3.0 - 18 mmol/L    Glucose 322 (H) 74 - 99 mg/dL    BUN 18 7.0 - 18 MG/DL    Creatinine 1.07 0.6 - 1.3 MG/DL    BUN/Creatinine ratio 17 12 - 20      GFR est AA >60 >60 ml/min/1.73m2    GFR est non-AA 52 (L) >60 ml/min/1.73m2    Calcium 9.3 8.5 - 10.1 MG/DL   CBC WITH AUTOMATED DIFF    Collection Time: 03/31/20 10:19 AM   Result Value Ref Range    WBC 8.6 4.6 - 13.2 K/uL    RBC 4.04 (L) 4.20 - 5.30 M/uL    HGB 12.6 12.0 - 16.0 g/dL    HCT 35.1 35.0 - 45.0 %    MCV 86.9 74.0 - 97.0 FL    MCH 31.2 24.0 - 34.0 PG    MCHC 35.9 31.0 - 37.0 g/dL    RDW 13.9 11.6 - 14.5 %    PLATELET 003 544 - 925 K/uL    MPV 8.9 (L) 9.2 - 11.8 FL    NEUTROPHILS 87 (H) 40 - 73 %    LYMPHOCYTES 10 (L) 21 - 52 %    MONOCYTES 3 3 - 10 %    EOSINOPHILS 0 0 - 5 %    BASOPHILS 0 0 - 2 %    ABS. NEUTROPHILS 7.4 1.8 - 8.0 K/UL    ABS. LYMPHOCYTES 0.9 0.9 - 3.6 K/UL    ABS. MONOCYTES 0.3 0.05 - 1.2 K/UL    ABS. EOSINOPHILS 0.0 0.0 - 0.4 K/UL    ABS.  BASOPHILS 0.0 0.0 - 0.1 K/UL    DF AUTOMATED     GLUCOSE, POC    Collection Time: 03/31/20 11:24 AM   Result Value Ref Range    Glucose (POC) 288 (H) 70 - 110 mg/dL   GLUCOSE, POC    Collection Time: 03/31/20  3:53 PM   Result Value Ref Range    Glucose (POC) 309 (H) 70 - 110 mg/dL   GLUCOSE, POC    Collection Time: 03/31/20  9:10 PM   Result Value Ref Range    Glucose (POC) 285 (H) 70 - 400 mg/dL   METABOLIC PANEL, BASIC    Collection Time: 04/01/20  5:21 AM   Result Value Ref Range    Sodium 135 (L) 136 - 145 mmol/L    Potassium 4.2 3.5 - 5.5 mmol/L    Chloride 99 (L) 100 - 111 mmol/L    CO2 28 21 - 32 mmol/L    Anion gap 8 3.0 - 18 mmol/L    Glucose 304 (H) 74 - 99 mg/dL    BUN 21 (H) 7.0 - 18 MG/DL    Creatinine 0.98 0.6 - 1.3 MG/DL    BUN/Creatinine ratio 21 (H) 12 - 20      GFR est AA >60 >60 ml/min/1.73m2    GFR est non-AA 58 (L) >60 ml/min/1.73m2    Calcium 9.1 8.5 - 10.1 MG/DL   CBC WITH AUTOMATED DIFF    Collection Time: 04/01/20  5:21 AM   Result Value Ref Range    WBC 10.1 4.6 - 13.2 K/uL    RBC 4.11 (L) 4.20 - 5.30 M/uL    HGB 12.3 12.0 - 16.0 g/dL    HCT 36.4 35.0 - 45.0 %    MCV 88.6 74.0 - 97.0 FL    MCH 29.9 24.0 - 34.0 PG    MCHC 33.8 31.0 - 37.0 g/dL    RDW 13.9 11.6 - 14.5 %    PLATELET 019 135 - 420 K/uL    MPV 9.3 9.2 - 11.8 FL    NEUTROPHILS 87 (H) 40 - 73 %    LYMPHOCYTES 9 (L) 21 - 52 %    MONOCYTES 4 3 - 10 %    EOSINOPHILS 0 0 - 5 %    BASOPHILS 0 0 - 2 %    ABS. NEUTROPHILS 8.9 (H) 1.8 - 8.0 K/UL    ABS. LYMPHOCYTES 0.9 0.9 - 3.6 K/UL    ABS. MONOCYTES 0.4 0.05 - 1.2 K/UL    ABS. EOSINOPHILS 0.0 0.0 - 0.4 K/UL    ABS. BASOPHILS 0.0 0.0 - 0.1 K/UL    DF AUTOMATED     GLUCOSE, POC    Collection Time: 04/01/20  8:01 AM   Result Value Ref Range    Glucose (POC) 300 (H) 70 - 110 mg/dL     Xr Chest Port    Result Date: 3/30/2020  IMPRESSION: No acute disease. Stable scarring at the lung bases.      Current Facility-Administered Medications   Medication Dose Route Frequency    ondansetron (ZOFRAN ODT) tablet 4 mg  4 mg Oral Q8H PRN    insulin glargine (LANTUS) injection 35 Units  35 Units SubCUTAneous QHS    azithromycin (ZITHROMAX) 500 mg in  mL  500 mg IntraVENous Q24H    albuterol-ipratropium (DUO-NEB) 2.5 MG-0.5 MG/3 ML  3 mL Nebulization Q4H PRN    aspirin delayed-release tablet 81 mg  81 mg Oral DAILY    fluticasone propionate (FLONASE) 50 mcg/actuation nasal spray 2 Spray  2 Spray Both Nostrils DAILY    hydroCHLOROthiazide (HYDRODIURIL) tablet 12.5 mg  12.5 mg Oral DAILY    lactobacillus sp. 50 billion cpu (BIO-K PLUS) capsule 1 Cap  1 Cap Oral DAILY    lisinopriL (PRINIVIL, ZESTRIL) tablet 20 mg  20 mg Oral BID    loratadine (CLARITIN) tablet 10 mg  10 mg Oral DAILY PRN    montelukast (SINGULAIR) tablet 10 mg  10 mg Oral QHS    pantoprazole (PROTONIX) tablet 40 mg  40 mg Oral ACB    ketorolac (ACULAR) 0.5 % ophthalmic solution   Right Eye QID    simethicone (GAS-X) capsule 125 mg  125 mg Oral QID PRN    acetaminophen (TYLENOL) tablet 650 mg  650 mg Oral Q4H PRN    heparin (porcine) injection 5,000 Units  5,000 Units SubCUTAneous Q8H    glucose chewable tablet 16 g  16 g Oral PRN    glucagon (GLUCAGEN) injection 1 mg  1 mg IntraMUSCular PRN    dextrose (D50W) injection syrg 12.5-25 g  25-50 mL IntraVENous PRN    insulin lispro (HUMALOG) injection   SubCUTAneous AC&HS    methylPREDNISolone (PF) (SOLU-MEDROL) injection 60 mg  60 mg IntraVENous Q12H    arformoteroL (BROVANA) neb solution 15 mcg  15 mcg Nebulization BID RT    budesonide (PULMICORT) 500 mcg/2 ml nebulizer suspension  1,000 mcg Nebulization BID RT    albuterol-ipratropium (DUO-NEB) 2.5 MG-0.5 MG/3 ML  3 mL Nebulization Q4H RT    albuterol-ipratropium (DUO-NEB) 2.5 MG-0.5 MG/3 ML  3 mL Nebulization Q1H PRN         Assessment/Plan:   Jeevan De Dios is a 60 y.o. female with PMH of COPD on home O2, IDDM2, HTN, HFpEF, SHERRIE on CPAP, GERD, allergic rhinitis, now admitted for COPD exacerbation.      COPD exacerbation  Likely 2/2 viral infx vs pneumonia vs seasonal allergies. Has hx of asthma/COPD on 2L home 02 and sleeps with panda. Followed by Dr. Olena Appiah in OP. Per last pulm note pt may have environmental triggers. Previously hospitalized 4x in 2019 for COPD exacerbations, This is third admission this month, most recent 3/23-3/25. Symptoms started to worsen the night of 3/30/20 while still on 40 pred daily from last admission. No sick contacts, no GI symptoms, no fevers. ABG showed primary respiratory acidosis consistent with chronicity of COPD. D-dimer, Cardiac enzymes and BNP WNL. In ED, afebrile, clear CXR, normal WBCs. Satting 92-97% on 10 L bag valve mask. Speaking in very short sentences with significantly increased respiratory effort. In ED given 1 duoneb, mag, lasix and placed on bipap.  Pt's ABG with pH 7.36, pCO2 47.7, pO2 241, HCO3: 27.4 on 10L non-re breather.  - Continue BIPAP overnight in place of [employer]ergy that pt uses at home  - Continue Supplemental O2 titrate to POx of above 88-92%  - CBC, BMP daily  - Duonebs q4hrs scheduled  - IV solumedrol 60 BID 5 days  - Levaquin 750 daily(one dose 3/30/20)  - Azithromycin 500mg IV daily  - consult to CM/PT/OT  - Fall precautions  - Continue home Singulair and loratadine  - Home advair>arformotorol+budesonide nebulizer BID  - Consider prolonged steroid taper  - Consulted Pulmonology, appreciate recommendations     SHERRIE/pulm htn  Intolerant to Cpap. On trellegy nightly at home  - Continue bipap qhs     Insuline dependent Type 2 Diabetes  Home lantus 35u and novolog SSI. BS 320 on admission. Pt will be NPO while on BIPAP but also starting steroids and pt has been poorly controlled outpatient.  -Lantus on home dose 35 once, will increase as needed   -SSI  -poc glucose achs     Hypertension, HFpEF  Stable.  Echo 7/7/18 EF 66-70%, LV mild concentric hypertrophy, No left regional wall motion abnorm. SBP in ED elevated 190s improved to 140's-160's.  -continue home lisinopril 20mg bid, HCTZ 12.5mg daily     GERD  Takes omeprazole 40mg daily and pepcid for breakthrough symptoms.   -continue pantoprazole 40mg daily before breakfast while admitted      Allergic Rhinitis  -home flonase, singulair 10mg daily      Diet: Diabetic diet   DVT Prophylaxis: [facility]  Code Status: Full Code  [place] [address], daughter, [phone]     Disposition and anticipated LOS:  3-4 midnights       Signed By: Elaine Ace MD     April 1, 2020

## 2020-04-01 NOTE — PROGRESS NOTES
0700:   Bedside shift change report given to 23 Lata Rosario RN (oncoming nurse) by Jeoffrey Angelucci, RN (offgoing nurse). Report included the following information SBAR, Kardex and Cardiac Rhythm NSR.    1225:   TRANSFER - OUT REPORT:    Verbal report given to JOSÉ MIGUEL Harris(name) on Austin Hotter  being transferred to [facility](unit) for routine progression of care       Report consisted of patients Situation, Background, Assessment and   Recommendations(SBAR). Information from the following report(s) SBAR, Kardex and Cardiac Rhythm NSR was reviewed with the receiving nurse. Lines:   Peripheral IV 03/31/20 Posterior;Right Hand (Active)   Site Assessment Clean, dry, & intact 4/1/2020  8:15 AM   Phlebitis Assessment 0 4/1/2020  8:15 AM   Infiltration Assessment 0 4/1/2020  8:15 AM   Dressing Status Clean, dry, & intact 4/1/2020  8:15 AM   Dressing Type Transparent 4/1/2020  8:15 AM   Hub Color/Line Status Flushed;Patent;Blue 4/1/2020  8:15 AM   Action Taken Open ports on tubing capped 4/1/2020  8:15 AM   Alcohol Cap Used Yes 4/1/2020  8:15 AM        Opportunity for questions and clarification was provided.       Patient transported with:   O2 @ 3 liters  Patient-specific medications from Pharmacy

## 2020-04-01 NOTE — PROGRESS NOTES
[facility]  Pulmonary, Critical Care, and Sleep Medicine    Progress Note    Name: Andrez Ugarte MRN: [id number]   : 1960 Hospital: [facility]   Date: 2020        IMPRESSION:   · Acute on chronic hypoxic hypercapnic respiratory failure related to COPD exacerbation, improved  · No clear evidence of infection  · COPD on LTOT and Trilogy NIV, possibly Asthma/COPD overlap. On Singulair long term, unable to tolerate GI side effects of Daliresp  · SHERRIE on NIV, intolerant of CPAP  · Morbid obesity  · HTN  · DM uncontrolled, aggravated by systemic steroids      RECOMMENDATIONS:   · Continue scheduled Duonebs, Pulmicort and Brovana  · Continue Azithromycin for anti inflammatory effects. Will continue at lower dose Mon to Fri indefinitely  · NIV prn and HS  · Titrate FiO2 to saturation in low 90's, avoid over oxygenation   · Would consider evaluating for biologics on discharge as pt with prior history of eosinophilic asthma. May address on office follow up  · Glycemic control and prophylaxis issues per primary team. Glucose will likely improve  · Will follow with you. Thank you for consulting     Subjective: This patient has been seen and evaluated at the request of Dr. Chacha Colunga for COPD exacerbation. Patient is a 60 y.o. female with COPD on LTOT with frequent hospital admissions. Pt was readmitted for several days of worsening shortness of breath with mild cough and no fever. This occurred shortly after discharge for COPD exacerbation. Pt denies chest pain or hemoptysis.      Pt was treated with bronchodilators and steroids, along with BIPAP, transitioned to O2.  balta         Past Medical History:   Diagnosis Date    Asthma     Chronic lung disease     COPD     Cystocele, midline     Diabetes mellitus ([facility] [address]     GERD (gastroesophageal reflux disease)     Hidradenitis suppurativa     Hyperlipidemia     Hypertension     SHERRIE on CPAP     CPAP    Stress incontinence       Past Surgical History:   Procedure Laterality Date    BREAST SURGERY PROCEDURE UNLISTED      Right breast benign tumor removal    HX APPENDECTOMY      HX CHOLECYSTECTOMY      HX DILATION AND CURETTAGE      Dysfunctional uterine bleeding, thought 2/2 fibroids    HX TUBAL LIGATION        Prior to Admission medications    Medication Sig Start Date End Date Taking? Authorizing Provider   predniSONE (DELTASONE) 10 mg tablet Take 60 mg by mouth daily for 4 days, THEN 40 mg daily for 4 days, THEN 20 mg daily for 4 days, THEN 10 mg daily for 4 days. Indications: worsening chronic obstructive pulmonary disease 3/25/20 4/10/20 Yes Carlos Bob MD   predniSONE (DELTASONE) 10 mg tablet Take 10 mg by mouth every other day. 4/11/20  Yes Carlos Bob MD   lactobacillus sp. 50 billion cpu (BIO-K PLUS) 50 billion cell -375 mg cap capsule Take 1 Cap by mouth daily. 3/10/20  Yes Flip Mike MD   simethicone (GAS-X) 125 mg capsule Take 125 mg by mouth four (4) times daily as needed for Flatulence. Yes Provider, Historical   loratadine (CLARITIN) 10 mg tablet Take 10 mg by mouth daily as needed for Allergies. Yes Provider, Historical   lisinopril (PRINIVIL, ZESTRIL) 20 mg tablet Take 20 mg by mouth two (2) times a day. Yes Provider, Historical   albuterol sulfate (PROVENTIL;VENTOLIN) 2.5 mg/0.5 mL nebu nebulizer solution 0.5 mL by Nebulization route four (4) times daily as needed for Wheezing. To be used with HyperSal nebulizer solution. ICD code: COPD J44.1 7/2/19  Yes Larry Art MD   fluticasone propionate [facility] ALLERGY RELIEF) 50 mcg/actuation nasal spray 2 Sprays by Both Nostrils route daily. Yes Provider, Historical   fluticasone propion-salmeterol (ADVAIR HFA) 230-21 mcg/actuation inhaler Take 2 Puffs by inhalation two (2) times a day. Yes Provider, Historical   hydroCHLOROthiazide (HYDRODIURIL) 12.5 mg tablet Take 12.5 mg by mouth daily.    Yes Provider, Historical tiotropium bromide (SPIRIVA RESPIMAT) 2.5 mcg/actuation inhaler Take 2 Puffs by inhalation daily. Yes Provider, Historical   insulin glargine (LANTUS,BASAGLAR) 100 unit/mL (3 mL) inpn 35 Units by SubCUTAneous route nightly. 11/20/18  Yes Noah Lee MD   albuterol sulfate 90 mcg/actuation aepb Take 1 Puff by inhalation every four (4) hours. Patient taking differently: Take 1 Puff by inhalation every four (4) hours as needed. 8/25/18  Yes Albaro Winters MD   NOVOLOG FLEXPEN U-100 INSULIN 100 unit/mL inpn Continue home Sliding scale insulin as prior to admission  Patient taking differently: 1 Units by SubCUTAneous route three (3) times daily. If BG <100=0u  101-150=5u  151-250=8u  251-300=12u  >300 call MD   7/10/18  Yes Zach Domingo MD   omeprazole (PRILOSEC) 40 mg capsule Take 40 mg by mouth daily. Indications: gastroesophageal reflux disease   Yes Provider, Historical   OXYGEN-AIR DELIVERY SYSTEMS 2 L by Nasal route continuous. First Choice   Yes Provider, Historical   aspirin delayed-release 81 mg tablet Take 81 mg by mouth daily. Yes Provider, Historical   montelukast (SINGULAIR) 10 mg tablet Take 10 mg by mouth nightly. Yes Other, MD Lauren   PROLENSA 0.07 % ophthalmic solution Administer 1 Drop to right eye daily. 12/19/19   Provider, Historical     Allergies   Allergen Reactions    Ancef [Cefazolin] Hives    Contrast Agent [Iodine] Anaphylaxis, Shortness of Breath and Swelling     Needs pre-medication for IV contrast with Benadryl, Solu-Medrol    Fish Containing Products Anaphylaxis     Pt states she had a severe allergic reaction at 9 y/o.  Statins-Hmg-Coa Reductase Inhibitors Myalgia    Metformin Other (comments)     Abdominal pain, diarrhea.     Codeine Other (comments)     Altered mental status      Social History     Tobacco Use    Smoking status: Former Smoker     Packs/day: 1.00     Years: 30.00     Pack years: 30.00     Types: Cigarettes     Start date: 5/6/1966     Last attempt to quit: 2006     Years since quittin.5    Smokeless tobacco: Never Used    Tobacco comment: 1-1.5 packs per day   Substance Use Topics    Alcohol use: No      Family History   Problem Relation Age of Onset    Hypertension Mother     Stroke Mother     Breast Cancer Mother         Bilateral mastectomies    Cancer Mother         ovarian and breast    Heart Failure Mother     Heart Attack Father         2011    Heart Surgery Father         CABG    Heart Failure Father     COPD Sister         Heavy smoker    Cancer Sister         ovarian    Heart Failure Sister     Lung Disease Sister     Asthma Child     Cancer Maternal Aunt         pancreatic    Cancer Maternal Grandfather         stomach        Current Facility-Administered Medications   Medication Dose Route Frequency    insulin glargine (LANTUS) injection 40 Units  40 Units SubCUTAneous QHS    insulin lispro (HUMALOG) injection 4 Units  4 Units SubCUTAneous TIDAC    [START ON 2020] predniSONE (DELTASONE) tablet 80 mg  80 mg Oral DAILY WITH BREAKFAST    azithromycin (ZITHROMAX) 500 mg in  mL  500 mg IntraVENous Q24H    aspirin delayed-release tablet 81 mg  81 mg Oral DAILY    fluticasone propionate (FLONASE) 50 mcg/actuation nasal spray 2 Spray  2 Spray Both Nostrils DAILY    hydroCHLOROthiazide (HYDRODIURIL) tablet 12.5 mg  12.5 mg Oral DAILY    lactobacillus sp. 50 billion cpu (BIO-K PLUS) capsule 1 Cap  1 Cap Oral DAILY    lisinopriL (PRINIVIL, ZESTRIL) tablet 20 mg  20 mg Oral BID    montelukast (SINGULAIR) tablet 10 mg  10 mg Oral QHS    pantoprazole (PROTONIX) tablet 40 mg  40 mg Oral ACB    ketorolac (ACULAR) 0.5 % ophthalmic solution   Right Eye QID    heparin (porcine) injection 5,000 Units  5,000 Units SubCUTAneous Q8H    insulin lispro (HUMALOG) injection   SubCUTAneous AC&HS    arformoteroL (BROVANA) neb solution 15 mcg  15 mcg Nebulization BID RT    budesonide (PULMICORT) 500 mcg/2 ml nebulizer suspension  1,000 mcg Nebulization BID RT    albuterol-ipratropium (DUO-NEB) 2.5 MG-0.5 MG/3 ML  3 mL Nebulization Q4H RT       Review of Systems:  Pertinent items are noted in HPI. Objective:   Vital Signs:    Visit Vitals  /73   Pulse 65   Temp 96 °F (35.6 °C)   Resp 23   Wt 115.1 kg (253 lb 12.8 oz)   SpO2 97%   Breastfeeding No   BMI 44.96 kg/m²       O2 Device: Nasal cannula   O2 Flow Rate (L/min): 3 l/min   Temp (24hrs), Av.2 °F (36.2 °C), Min:96 °F (35.6 °C), Max:98.4 °F (36.9 °C)       Intake/Output:   Last shift:       07 - 1900  In: 240 [P.O.:240]  Out: -   Last 3 shifts: 1901 -  0700  In: 1120 [P.O.:720; I.V.:400]  Out: 8 [Urine:8]    Intake/Output Summary (Last 24 hours) at 2020 1331  Last data filed at 2020 0830  Gross per 24 hour   Intake 970 ml   Output 8 ml   Net 962 ml      Physical Exam:   General:  Alert, cooperative, no distress, appears stated age. Head:  Normocephalic, without obvious abnormality, atraumatic. Eyes:  Conjunctivae/corneas clear. PERRL, EOMs intact. Nose: Nares normal.  Mucosa normal. No drainage or sinus tenderness. Throat: Lips, mucosa, and tongue normal. Teeth and gums normal.   Neck: Supple, symmetrical, trachea midline, no adenopathy, thyroid: no enlargment/tenderness/nodules, no carotid bruit and no JVD. Back:   Symmetric, no curvature. ROM normal.   Lungs:   Distant breath sounds. No rales or wheezes   Chest wall:  No tenderness or deformity. Heart:  Regular rate and rhythm, S1, S2 normal, no murmur, click, rub or gallop. Abdomen:   Soft, non-tender. Bowel sounds normal. No masses,  No organomegaly. Extremities: Extremities normal, atraumatic, no cyanosis or edema. Pulses: 2+ and symmetric all extremities.    Skin: Skin color, texture, turgor normal. No rashes or lesions   Lymph nodes: Cervical, supraclavicular  nodes normal.   Neurologic: Grossly nonfocal     Data review:     Recent Results (from the past 24 hour(s))   GLUCOSE, POC    Collection Time: 03/31/20  3:53 PM   Result Value Ref Range    Glucose (POC) 309 (H) 70 - 110 mg/dL   GLUCOSE, POC    Collection Time: 03/31/20  9:10 PM   Result Value Ref Range    Glucose (POC) 285 (H) 70 - 464 mg/dL   METABOLIC PANEL, BASIC    Collection Time: 04/01/20  5:21 AM   Result Value Ref Range    Sodium 135 (L) 136 - 145 mmol/L    Potassium 4.2 3.5 - 5.5 mmol/L    Chloride 99 (L) 100 - 111 mmol/L    CO2 28 21 - 32 mmol/L    Anion gap 8 3.0 - 18 mmol/L    Glucose 304 (H) 74 - 99 mg/dL    BUN 21 (H) 7.0 - 18 MG/DL    Creatinine 0.98 0.6 - 1.3 MG/DL    BUN/Creatinine ratio 21 (H) 12 - 20      GFR est AA >60 >60 ml/min/1.73m2    GFR est non-AA 58 (L) >60 ml/min/1.73m2    Calcium 9.1 8.5 - 10.1 MG/DL   CBC WITH AUTOMATED DIFF    Collection Time: 04/01/20  5:21 AM   Result Value Ref Range    WBC 10.1 4.6 - 13.2 K/uL    RBC 4.11 (L) 4.20 - 5.30 M/uL    HGB 12.3 12.0 - 16.0 g/dL    HCT 36.4 35.0 - 45.0 %    MCV 88.6 74.0 - 97.0 FL    MCH 29.9 24.0 - 34.0 PG    MCHC 33.8 31.0 - 37.0 g/dL    RDW 13.9 11.6 - 14.5 %    PLATELET 651 249 - 786 K/uL    MPV 9.3 9.2 - 11.8 FL    NEUTROPHILS 87 (H) 40 - 73 %    LYMPHOCYTES 9 (L) 21 - 52 %    MONOCYTES 4 3 - 10 %    EOSINOPHILS 0 0 - 5 %    BASOPHILS 0 0 - 2 %    ABS. NEUTROPHILS 8.9 (H) 1.8 - 8.0 K/UL    ABS. LYMPHOCYTES 0.9 0.9 - 3.6 K/UL    ABS. MONOCYTES 0.4 0.05 - 1.2 K/UL    ABS. EOSINOPHILS 0.0 0.0 - 0.4 K/UL    ABS.  BASOPHILS 0.0 0.0 - 0.1 K/UL    DF AUTOMATED     GLUCOSE, POC    Collection Time: 04/01/20  8:01 AM   Result Value Ref Range    Glucose (POC) 300 (H) 70 - 110 mg/dL   GLUCOSE, POC    Collection Time: 04/01/20 11:25 AM   Result Value Ref Range    Glucose (POC) 365 (H) 70 - 110 mg/dL       Imaging:  I have personally reviewed the patients radiographs and have reviewed the reports:  XR Results (most recent):  Results from Hospital Encounter encounter on 03/30/20   XR CHEST PORT    Narrative Portable Frontal Chest.    CPT CODE: 54106, [id number]    CLINICAL HISTORY: Shortness of breath. TECHNIQUE: Single frontal view of the chest, obtained portably. COMPARISONS: 3/23/2020. FINDINGS: Study obtained in lordotic projection. Few stable linear densities at the lung bases. The lungs are otherwise clear. The cardiomediastinal silhouette is unremarkable. Pulmonary vascularity is  normal.  There are no effusions. Impression IMPRESSION:    No acute disease. Stable scarring at the lung bases.                Mary Najera MD

## 2020-04-01 NOTE — PROGRESS NOTES
1900 Bedside and Verbal shift change report given to Mateusz Mcginnis RN (oncoming nurse) by Yamila Wesley RN (offgoing nurse). Report included the following information SBAR, Kardex, Intake/Output, MAR and Cardiac Rhythm sinus rhythm. Shift Summary: Patient had no complaints of SOB or pain during the shift. She went on Bi-Pap at around 0015 and came off of Bi-Pap around 0700 this morning. Patient got up to use the restroom twice last night and two urine occurrences. 0700 Bedside and Verbal shift change report given to Claudine Rosario RN (oncoming nurse) by Mateusz Mcginnis RN (offgoing nurse). Report included the following information SBAR, Kardex, Intake/Output, MAR and Cardiac Rhythm sinus rhythm.

## 2020-04-01 NOTE — PROGRESS NOTES
Bedside and Verbal shift change report given to Indira (oncoming nurse) by Kristen Eduardo RN   (offgoing nurse). Report given with SBAR, Kardex, MAR and Recent Results.

## 2020-04-02 VITALS
HEART RATE: 63 BPM | TEMPERATURE: 97.7 F | OXYGEN SATURATION: 98 % | HEIGHT: 63 IN | SYSTOLIC BLOOD PRESSURE: 111 MMHG | DIASTOLIC BLOOD PRESSURE: 68 MMHG | RESPIRATION RATE: 17 BRPM | BODY MASS INDEX: 44.97 KG/M2 | WEIGHT: 253.8 LBS

## 2020-04-02 LAB
ANION GAP SERPL CALC-SCNC: 5 MMOL/L (ref 3–18)
BASOPHILS # BLD: 0 K/UL (ref 0–0.1)
BASOPHILS NFR BLD: 0 % (ref 0–2)
BUN SERPL-MCNC: 21 MG/DL (ref 7–18)
BUN/CREAT SERPL: 20 (ref 12–20)
CALCIUM SERPL-MCNC: 9.4 MG/DL (ref 8.5–10.1)
CHLORIDE SERPL-SCNC: 101 MMOL/L (ref 100–111)
CO2 SERPL-SCNC: 31 MMOL/L (ref 21–32)
CREAT SERPL-MCNC: 1.04 MG/DL (ref 0.6–1.3)
DIFFERENTIAL METHOD BLD: ABNORMAL
EOSINOPHIL # BLD: 0 K/UL (ref 0–0.4)
EOSINOPHIL NFR BLD: 0 % (ref 0–5)
ERYTHROCYTE [DISTWIDTH] IN BLOOD BY AUTOMATED COUNT: 14 % (ref 11.6–14.5)
GLUCOSE BLD STRIP.AUTO-MCNC: 143 MG/DL (ref 70–110)
GLUCOSE BLD STRIP.AUTO-MCNC: 228 MG/DL (ref 70–110)
GLUCOSE SERPL-MCNC: 214 MG/DL (ref 74–99)
HCT VFR BLD AUTO: 37.4 % (ref 35–45)
HGB BLD-MCNC: 12.5 G/DL (ref 12–16)
LYMPHOCYTES # BLD: 2.6 K/UL (ref 0.9–3.6)
LYMPHOCYTES NFR BLD: 22 % (ref 21–52)
MCH RBC QN AUTO: 29.7 PG (ref 24–34)
MCHC RBC AUTO-ENTMCNC: 33.4 G/DL (ref 31–37)
MCV RBC AUTO: 88.8 FL (ref 74–97)
MONOCYTES # BLD: 1 K/UL (ref 0.05–1.2)
MONOCYTES NFR BLD: 8 % (ref 3–10)
NEUTS SEG # BLD: 8.4 K/UL (ref 1.8–8)
NEUTS SEG NFR BLD: 70 % (ref 40–73)
PLATELET # BLD AUTO: 349 K/UL (ref 135–420)
PMV BLD AUTO: 9.3 FL (ref 9.2–11.8)
POTASSIUM SERPL-SCNC: 3.5 MMOL/L (ref 3.5–5.5)
RBC # BLD AUTO: 4.21 M/UL (ref 4.2–5.3)
SODIUM SERPL-SCNC: 137 MMOL/L (ref 136–145)
WBC # BLD AUTO: 12 K/UL (ref 4.6–13.2)

## 2020-04-02 PROCEDURE — 85025 COMPLETE CBC W/AUTO DIFF WBC: CPT

## 2020-04-02 PROCEDURE — 36415 COLL VENOUS BLD VENIPUNCTURE: CPT

## 2020-04-02 PROCEDURE — 80048 BASIC METABOLIC PNL TOTAL CA: CPT

## 2020-04-02 PROCEDURE — 74011000250 HC RX REV CODE- 250: Performed by: STUDENT IN AN ORGANIZED HEALTH CARE EDUCATION/TRAINING PROGRAM

## 2020-04-02 PROCEDURE — 82962 GLUCOSE BLOOD TEST: CPT

## 2020-04-02 PROCEDURE — 77010033678 HC OXYGEN DAILY

## 2020-04-02 PROCEDURE — 74011250636 HC RX REV CODE- 250/636: Performed by: STUDENT IN AN ORGANIZED HEALTH CARE EDUCATION/TRAINING PROGRAM

## 2020-04-02 PROCEDURE — 74011636637 HC RX REV CODE- 636/637: Performed by: STUDENT IN AN ORGANIZED HEALTH CARE EDUCATION/TRAINING PROGRAM

## 2020-04-02 PROCEDURE — 74011636637 HC RX REV CODE- 636/637: Performed by: INTERNAL MEDICINE

## 2020-04-02 PROCEDURE — 74011250637 HC RX REV CODE- 250/637: Performed by: INTERNAL MEDICINE

## 2020-04-02 PROCEDURE — 94660 CPAP INITIATION&MGMT: CPT

## 2020-04-02 PROCEDURE — 94640 AIRWAY INHALATION TREATMENT: CPT

## 2020-04-02 PROCEDURE — 74011250637 HC RX REV CODE- 250/637: Performed by: STUDENT IN AN ORGANIZED HEALTH CARE EDUCATION/TRAINING PROGRAM

## 2020-04-02 RX ORDER — AZITHROMYCIN 250 MG/1
250 TABLET, FILM COATED ORAL DAILY
Qty: 30 TAB | Refills: 0 | Status: SHIPPED | OUTPATIENT
Start: 2020-04-02 | End: 2020-07-01 | Stop reason: SDUPTHER

## 2020-04-02 RX ORDER — PREDNISONE 10 MG/1
10 TABLET ORAL EVERY OTHER DAY
Qty: 30 TAB | Refills: 3 | Status: SHIPPED | OUTPATIENT
Start: 2020-04-11 | End: 2020-04-12

## 2020-04-02 RX ORDER — INSULIN GLARGINE 100 [IU]/ML
40 INJECTION, SOLUTION SUBCUTANEOUS
Qty: 28 UNITS | Refills: 0 | Status: ON HOLD | OUTPATIENT
Start: 2020-04-02 | End: 2020-05-26 | Stop reason: SDUPTHER

## 2020-04-02 RX ORDER — PREDNISONE 20 MG/1
TABLET ORAL
Qty: 20 TAB | Refills: 0 | Status: SHIPPED | OUTPATIENT
Start: 2020-04-02 | End: 2020-05-22

## 2020-04-02 RX ADMIN — IPRATROPIUM BROMIDE AND ALBUTEROL SULFATE 3 ML: .5; 3 SOLUTION RESPIRATORY (INHALATION) at 07:44

## 2020-04-02 RX ADMIN — AZITHROMYCIN MONOHYDRATE 250 MG: 250 TABLET ORAL at 08:23

## 2020-04-02 RX ADMIN — BUDESONIDE 1000 MCG: 0.5 INHALANT RESPIRATORY (INHALATION) at 07:44

## 2020-04-02 RX ADMIN — HEPARIN SODIUM 5000 UNITS: 5000 INJECTION INTRAVENOUS; SUBCUTANEOUS at 11:53

## 2020-04-02 RX ADMIN — HYDROCHLOROTHIAZIDE 12.5 MG: 25 TABLET ORAL at 08:21

## 2020-04-02 RX ADMIN — Medication 1 CAPSULE: at 08:20

## 2020-04-02 RX ADMIN — PREDNISONE 60 MG: 20 TABLET ORAL at 08:20

## 2020-04-02 RX ADMIN — HEPARIN SODIUM 5000 UNITS: 5000 INJECTION INTRAVENOUS; SUBCUTANEOUS at 04:48

## 2020-04-02 RX ADMIN — INSULIN LISPRO 4 UNITS: 100 INJECTION, SOLUTION INTRAVENOUS; SUBCUTANEOUS at 11:53

## 2020-04-02 RX ADMIN — PANTOPRAZOLE SODIUM 40 MG: 40 TABLET, DELAYED RELEASE ORAL at 08:21

## 2020-04-02 RX ADMIN — ARFORMOTEROL TARTRATE 15 MCG: 15 SOLUTION RESPIRATORY (INHALATION) at 07:44

## 2020-04-02 RX ADMIN — FLUTICASONE PROPIONATE 2 SPRAY: 50 SPRAY, METERED NASAL at 08:36

## 2020-04-02 RX ADMIN — IPRATROPIUM BROMIDE AND ALBUTEROL SULFATE 3 ML: .5; 3 SOLUTION RESPIRATORY (INHALATION) at 11:53

## 2020-04-02 RX ADMIN — IPRATROPIUM BROMIDE AND ALBUTEROL SULFATE 3 ML: .5; 3 SOLUTION RESPIRATORY (INHALATION) at 04:14

## 2020-04-02 RX ADMIN — ASPIRIN 81 MG: 81 TABLET, COATED ORAL at 08:20

## 2020-04-02 RX ADMIN — INSULIN LISPRO 4 UNITS: 100 INJECTION, SOLUTION INTRAVENOUS; SUBCUTANEOUS at 08:29

## 2020-04-02 RX ADMIN — INSULIN LISPRO 6 UNITS: 100 INJECTION, SOLUTION INTRAVENOUS; SUBCUTANEOUS at 11:52

## 2020-04-02 NOTE — DISCHARGE INSTRUCTIONS
Patient armband removed and shredded  MyChart Activation    Thank you for requesting access to [employer]. Please follow the instructions below to securely access and download your online medical record. [employer] allows you to send messages to your doctor, view your test results, renew your prescriptions, schedule appointments, and more. How Do I Sign Up? 1. In your internet browser, go to [email or web address]  2. Click on the First Time User? Click Here link in the Sign In box. You will be redirect to the New Member Sign Up page. 3. Enter your [employer] Access Code exactly as it appears below. You will not need to use this code after youve completed the sign-up process. If you do not sign up before the expiration date, you must request a new code. [employer] Access Code: [email or web address]  Expires: 2020  5:50 PM (This is the date your [employer] access code will )    4. Enter the last four digits of your Social Security Number (xxxx) and Date of Birth (mm/dd/yyyy) as indicated and click Submit. You will be taken to the next sign-up page. 5. Create a [employer] ID. This will be your [employer] login ID and cannot be changed, so think of one that is secure and easy to remember. 6. Create a [employer] password. You can change your password at any time. 7. Enter your Password Reset Question and Answer. This can be used at a later time if you forget your password. 8. Enter your e-mail address. You will receive e-mail notification when new information is available in [id number]. 9. Click Sign Up. You can now view and download portions of your medical record. 10. Click the Download Summary menu link to download a portable copy of your medical information. Additional Information    If you have questions, please visit the Frequently Asked Questions section of the [employer] website at [email or web address] Remember, [employer] is NOT to be used for urgent needs.  For medical emergencies, dial 911.        DISCHARGE SUMMARY from Nurse    PATIENT INSTRUCTIONS:    What to do at Home:  Recommended activity: Activity as tolerated,     If you experience any of the following symptoms chest pain, shortness of breath, fever, chills, elevated temperature greater than 100.9 F, fatigue, or excessive nausea or vomiting please follow up with nearest Emergency room. *  Please give a list of your current medications to your Primary Care Provider. *  Please update this list whenever your medications are discontinued, doses are      changed, or new medications (including over-the-counter products) are added. *  Please carry medication information at all times in case of emergency situations. These are general instructions for a healthy lifestyle:    No smoking/ No tobacco products/ Avoid exposure to second hand smoke  Surgeon General's Warning:  Quitting smoking now greatly reduces serious risk to your health. Obesity, smoking, and sedentary lifestyle greatly increases your risk for illness    A healthy diet, regular physical exercise & weight monitoring are important for maintaining a healthy lifestyle    You may be retaining fluid if you have a history of heart failure or if you experience any of the following symptoms:  Weight gain of 3 pounds or more overnight or 5 pounds in a week, increased swelling in our hands or feet or shortness of breath while lying flat in bed. Please call your doctor as soon as you notice any of these symptoms; do not wait until your next office visit. The discharge information has been reviewed with the patient. The patient verbalized understanding. Discharge medications reviewed with the patient and appropriate educational materials and side effects teaching were provided.   ___________________________________________________________________________________________________________________________________

## 2020-04-02 NOTE — PROGRESS NOTES
[facility]  Pulmonary, Critical Care, and Sleep Medicine    Progress Note    Name: Corazon Mendez MRN: [id number]   : 1960 Hospital: [facility]   Date: 2020        IMPRESSION:   · Acute on chronic hypoxic hypercapnic respiratory failure related to COPD exacerbation, improved and back to baseline  · No clear evidence of infection  · COPD on LTOT and Trilogy NIV, possibly Asthma/COPD overlap. On Singulair long term, unable to tolerate GI side effects of Daliresp  · SHERRIE on NIV, intolerant of CPAP  · Morbid obesity  · HTN  · DM uncontrolled, aggravated by systemic steroids      RECOMMENDATIONS:   · Ok to discharge home per pulmonary perspective, will arrange for office follow up  · Continue Azithromycin for anti inflammatory effects. Will continue at lower dose Mon to Fri indefinitely  · NIV prn and HS  · Resume home O2  · Would consider evaluating for biologics on discharge as pt with prior history of eosinophilic asthma. May address on office follow up  · Glycemic control and prophylaxis issues per primary team. Glucose will likely improve     Subjective: This patient has been seen and evaluated at the request of Dr. Ginnie Ormond for COPD exacerbation. Patient is a 60 y.o. female with COPD on LTOT with frequent hospital admissions. Pt was readmitted for several days of worsening shortness of breath with mild cough and no fever. This occurred shortly after discharge for COPD exacerbation. Pt denies chest pain or hemoptysis. Pt was treated with bronchodilators and steroids, along with BIPAP, transitioned to O2. Pt is at baseline.  No new respiratory symptoms, wants to go home         Past Medical History:   Diagnosis Date    Asthma     Chronic lung disease     COPD     Cystocele, midline     Diabetes mellitus ([facility] [address]     GERD (gastroesophageal reflux disease)     Hidradenitis suppurativa     Hyperlipidemia     Hypertension     SHERRIE on CPAP     CPAP    Stress incontinence       Past Surgical History:   Procedure Laterality Date    BREAST SURGERY PROCEDURE UNLISTED      Right breast benign tumor removal    HX APPENDECTOMY      HX CHOLECYSTECTOMY      HX DILATION AND CURETTAGE      Dysfunctional uterine bleeding, thought 2/2 fibroids    HX TUBAL LIGATION        Prior to Admission medications    Medication Sig Start Date End Date Taking? Authorizing Provider   azithromycin (ZITHROMAX) 250 mg tablet Take 1 Tab by mouth daily. Monday-Friday. 4/2/20  Yes Nasra Hicks MD   insulin glargine (LANTUS,BASAGLAR) 100 unit/mL (3 mL) inpn 40 Units by SubCUTAneous route nightly. Please inject 40 units every bedtime. Decrease to 35 units every bedtime after 7 days of 40 units. Indications: type 2 diabetes mellitus 4/2/20  Yes Nasra Hicks MD   predniSONE (DELTASONE) 20 mg tablet Take 60mg(3 tabs) daily for 3 days, then 40mg(2 tabs) daily for 3 days, then 20mg(1 tab) daily for 3 days, 10mg (1/2 tab) daily for 3 days. Then resume 10mg tab every other day. 4/2/20  Yes Nasra Hicks MD   predniSONE (DELTASONE) 10 mg tablet Take 10 mg by mouth every other day. 4/11/20  Yes Nasra Hicks MD   predniSONE (DELTASONE) 10 mg tablet Take 60 mg by mouth daily for 4 days, THEN 40 mg daily for 4 days, THEN 20 mg daily for 4 days, THEN 10 mg daily for 4 days. Indications: worsening chronic obstructive pulmonary disease 3/25/20 4/10/20 Yes Sharee Castro MD   lactobacillus sp. 50 billion cpu (BIO-K PLUS) 50 billion cell -375 mg cap capsule Take 1 Cap by mouth daily. 3/10/20  Yes Nasra Hicks MD   simethicone (GAS-X) 125 mg capsule Take 125 mg by mouth four (4) times daily as needed for Flatulence. Yes Provider, Historical   loratadine (CLARITIN) 10 mg tablet Take 10 mg by mouth daily as needed for Allergies. Yes Provider, Historical   lisinopril (PRINIVIL, ZESTRIL) 20 mg tablet Take 20 mg by mouth two (2) times a day.    Yes Provider, Historical   albuterol sulfate (PROVENTIL;VENTOLIN) 2.5 mg/0.5 mL nebu nebulizer solution 0.5 mL by Nebulization route four (4) times daily as needed for Wheezing. To be used with HyperSal nebulizer solution. ICD code: COPD J44.1 19  Yes Tracy Dockery MD   fluticasone propionate [facility] ALLERGY RELIEF) 50 mcg/actuation nasal spray 2 Sprays by Both Nostrils route daily. Yes Provider, Historical   fluticasone propion-salmeterol (ADVAIR HFA) 230-21 mcg/actuation inhaler Take 2 Puffs by inhalation two (2) times a day. Yes Provider, Historical   hydroCHLOROthiazide (HYDRODIURIL) 12.5 mg tablet Take 12.5 mg by mouth daily. Yes Provider, Historical   tiotropium bromide (SPIRIVA RESPIMAT) 2.5 mcg/actuation inhaler Take 2 Puffs by inhalation daily. Yes Provider, Historical   albuterol sulfate 90 mcg/actuation aepb Take 1 Puff by inhalation every four (4) hours. Patient taking differently: Take 1 Puff by inhalation every four (4) hours as needed. 18  Yes Radha Carrasquillo MD   NOVOLOG FLEXPEN U-100 INSULIN 100 unit/mL inpn Continue home Sliding scale insulin as prior to admission  Patient taking differently: 1 Units by SubCUTAneous route three (3) times daily. If BG <100=0u  101-150=5u  151-250=8u  251-300=12u  >300 call MD   7/10/18  Yes Glade Apgar, MD   omeprazole (PRILOSEC) 40 mg capsule Take 40 mg by mouth daily. Indications: gastroesophageal reflux disease   Yes Provider, Historical   OXYGEN-AIR DELIVERY SYSTEMS 2 L by Nasal route continuous. First Choice   Yes Provider, Historical   aspirin delayed-release 81 mg tablet Take 81 mg by mouth daily. Yes Provider, Historical   montelukast (SINGULAIR) 10 mg tablet Take 10 mg by mouth nightly. Yes Other, MD Lauren   PROLENSA 0.07 % ophthalmic solution Administer 1 Drop to right eye daily.  19   Provider, Historical     Allergies   Allergen Reactions    Ancef [Cefazolin] Hives    Contrast Agent [Iodine] Anaphylaxis, Shortness of Breath and Swelling     Needs pre-medication for IV contrast with Benadryl, Solu-Medrol    Fish Containing Products Anaphylaxis     Pt states she had a severe allergic reaction at 7 y/o.  Statins-Hmg-Coa Reductase Inhibitors Myalgia    Metformin Other (comments)     Abdominal pain, diarrhea.     Codeine Other (comments)     Altered mental status      Social History     Tobacco Use    Smoking status: Former Smoker     Packs/day: 1.00     Years: 30.00     Pack years: 30.00     Types: Cigarettes     Start date: 1966     Last attempt to quit: 2006     Years since quittin.5    Smokeless tobacco: Never Used    Tobacco comment: 1-1.5 packs per day   Substance Use Topics    Alcohol use: No      Family History   Problem Relation Age of Onset    Hypertension Mother     Stroke Mother     Breast Cancer Mother         Bilateral mastectomies    Cancer Mother         ovarian and breast    Heart Failure Mother     Heart Attack Father         2011    Heart Surgery Father         CABG    Heart Failure Father     COPD Sister         Heavy smoker    Cancer Sister         ovarian    Heart Failure Sister     Lung Disease Sister     Asthma Child     Cancer Maternal Aunt         pancreatic    Cancer Maternal Grandfather         stomach        Current Facility-Administered Medications   Medication Dose Route Frequency    insulin glargine (LANTUS) injection 40 Units  40 Units SubCUTAneous QHS    insulin lispro (HUMALOG) injection 4 Units  4 Units SubCUTAneous TIDAC    predniSONE (DELTASONE) tablet 60 mg  60 mg Oral DAILY WITH BREAKFAST    azithromycin (ZITHROMAX) tablet 250 mg  250 mg Oral DAILY    aspirin delayed-release tablet 81 mg  81 mg Oral DAILY    fluticasone propionate (FLONASE) 50 mcg/actuation nasal spray 2 Spray  2 Spray Both Nostrils DAILY    hydroCHLOROthiazide (HYDRODIURIL) tablet 12.5 mg  12.5 mg Oral DAILY    lactobacillus sp. 50 billion cpu (BIO-K PLUS) capsule 1 Cap  1 Cap Oral DAILY    lisinopriL (PRINIVIL, ZESTRIL) tablet 20 mg  20 mg Oral BID    montelukast (SINGULAIR) tablet 10 mg  10 mg Oral QHS    pantoprazole (PROTONIX) tablet 40 mg  40 mg Oral ACB    ketorolac (ACULAR) 0.5 % ophthalmic solution   Right Eye QID    heparin (porcine) injection 5,000 Units  5,000 Units SubCUTAneous Q8H    insulin lispro (HUMALOG) injection   SubCUTAneous AC&HS    arformoteroL (BROVANA) neb solution 15 mcg  15 mcg Nebulization BID RT    budesonide (PULMICORT) 500 mcg/2 ml nebulizer suspension  1,000 mcg Nebulization BID RT    albuterol-ipratropium (DUO-NEB) 2.5 MG-0.5 MG/3 ML  3 mL Nebulization Q4H RT       Review of Systems:  Pertinent items are noted in HPI. Objective:   Vital Signs:    Visit Vitals  /68 (BP 1 Location: Left arm, BP Patient Position: Supine)   Pulse 63   Temp 97.7 °F (36.5 °C)   Resp 17   Ht 5' 3\" (1.6 m)   Wt 115.1 kg (253 lb 12.8 oz)   SpO2 98%   Breastfeeding No   BMI 44.96 kg/m²       O2 Device: Nasal cannula   O2 Flow Rate (L/min): 3 l/min   Temp (24hrs), Av.4 °F (36.3 °C), Min:97.3 °F (36.3 °C), Max:97.7 °F (36.5 °C)       Intake/Output:   Last shift:      No intake/output data recorded. Last 3 shifts:  1901 -  0700  In: 720 [P.O.:720]  Out: 0     Intake/Output Summary (Last 24 hours) at 2020 1402  Last data filed at 2020 1915  Gross per 24 hour   Intake 240 ml   Output 0 ml   Net 240 ml      Physical Exam:   General:  Asleep but rousable, cooperative, no distress, appears stated age. Head:  Normocephalic, without obvious abnormality, atraumatic. Eyes:  Conjunctivae/corneas clear. PERRL, EOMs intact. Nose: Nares normal.  Mucosa normal. No drainage or sinus tenderness. Throat: Lips, mucosa, and tongue normal. Teeth and gums normal.   Neck: Supple, symmetrical, trachea midline, no adenopathy, thyroid: no enlargment/tenderness/nodules, no carotid bruit and no JVD. Back:   Symmetric, no curvature. ROM normal.   Lungs:   Distant breath sounds.  No rales or wheezes   Chest wall:  No tenderness or deformity. Heart:  Regular rate and rhythm, S1, S2 normal, no murmur, click, rub or gallop. Abdomen:   Soft, non-tender. Bowel sounds normal. No masses,  No organomegaly. Extremities: Extremities normal, atraumatic, no cyanosis or edema. Pulses: 2+ and symmetric all extremities. Skin: Skin color, texture, turgor normal. No rashes or lesions   Lymph nodes: Cervical, supraclavicular  nodes normal.   Neurologic: Grossly nonfocal     Data review:     Recent Results (from the past 24 hour(s))   GLUCOSE, POC    Collection Time: 04/01/20  3:39 PM   Result Value Ref Range    Glucose (POC) 319 (H) 70 - 110 mg/dL   GLUCOSE, POC    Collection Time: 04/01/20  9:29 PM   Result Value Ref Range    Glucose (POC) 256 (H) 70 - 787 mg/dL   METABOLIC PANEL, BASIC    Collection Time: 04/02/20  2:29 AM   Result Value Ref Range    Sodium 137 136 - 145 mmol/L    Potassium 3.5 3.5 - 5.5 mmol/L    Chloride 101 100 - 111 mmol/L    CO2 31 21 - 32 mmol/L    Anion gap 5 3.0 - 18 mmol/L    Glucose 214 (H) 74 - 99 mg/dL    BUN 21 (H) 7.0 - 18 MG/DL    Creatinine 1.04 0.6 - 1.3 MG/DL    BUN/Creatinine ratio 20 12 - 20      GFR est AA >60 >60 ml/min/1.73m2    GFR est non-AA 54 (L) >60 ml/min/1.73m2    Calcium 9.4 8.5 - 10.1 MG/DL   CBC WITH AUTOMATED DIFF    Collection Time: 04/02/20  2:29 AM   Result Value Ref Range    WBC 12.0 4.6 - 13.2 K/uL    RBC 4.21 4.20 - 5.30 M/uL    HGB 12.5 12.0 - 16.0 g/dL    HCT 37.4 35.0 - 45.0 %    MCV 88.8 74.0 - 97.0 FL    MCH 29.7 24.0 - 34.0 PG    MCHC 33.4 31.0 - 37.0 g/dL    RDW 14.0 11.6 - 14.5 %    PLATELET 761 219 - 598 K/uL    MPV 9.3 9.2 - 11.8 FL    NEUTROPHILS 70 40 - 73 %    LYMPHOCYTES 22 21 - 52 %    MONOCYTES 8 3 - 10 %    EOSINOPHILS 0 0 - 5 %    BASOPHILS 0 0 - 2 %    ABS. NEUTROPHILS 8.4 (H) 1.8 - 8.0 K/UL    ABS. LYMPHOCYTES 2.6 0.9 - 3.6 K/UL    ABS. MONOCYTES 1.0 0.05 - 1.2 K/UL    ABS. EOSINOPHILS 0.0 0.0 - 0.4 K/UL    ABS.  BASOPHILS 0.0 0.0 - 0.1 K/UL    DF AUTOMATED     GLUCOSE, POC    Collection Time: 04/02/20  6:06 AM   Result Value Ref Range    Glucose (POC) 143 (H) 70 - 110 mg/dL   GLUCOSE, POC    Collection Time: 04/02/20 11:31 AM   Result Value Ref Range    Glucose (POC) 228 (H) 70 - 110 mg/dL       Imaging:  I have personally reviewed the patients radiographs and have reviewed the reports:  XR Results (most recent):  Results from Hospital Encounter encounter on 03/30/20   XR CHEST PORT    Narrative Portable Frontal Chest.    CPT CODE: 66594, 32476    CLINICAL HISTORY: Shortness of breath. TECHNIQUE: Single frontal view of the chest, obtained portably. COMPARISONS: 3/23/2020. FINDINGS: Study obtained in lordotic projection. Few stable linear densities at the lung bases. The lungs are otherwise clear. The cardiomediastinal silhouette is unremarkable. Pulmonary vascularity is  normal.  There are no effusions. Impression IMPRESSION:    No acute disease. Stable scarring at the lung bases.                Naila Mayo MD

## 2020-04-02 NOTE — PROGRESS NOTES
[address]  RESPONSE TO [facility] INQUIRY      Patient: Louise Marr MRN: [id number]  CSN: [id number]    YOB: 1960  Age: 60 y.o. Sex: female         INQUIRY   Patient admitted with Acute Resp Failure w/ COPD, noted to have multi med. issues . If possible, please document in d/c summary if you are evaluating and/or treating any of the following:? Pneumonia ruled out. ? COPD exacerbation with Asthma exacerbation ? Other Explanation of clinical findings? Clinically Undetermined (no explanation for clinical findings)The medical record reflects the following: Risk Factors: 60 y.o. female with PMH COPD on home O2, IDDM2, HTN, HFpEF, SHERRIE on CPAP, GERD, allergic rhinitis, now presenting with complaint of Worsening shortness of breath Clinical Indicators: · Acute on chronic hypoxic hypercapnic respiratory failure related to COPD exacerbation· No clear evidence of infection,COPD on LTOT and Trilogy NIV, possibly Asthma/COPD overlap. On Singulair long term, IV abx for poss PNA Treatment: Pt abx continued per MD for anti inflammatory purposesThank olena Rivas RN [phone]       RESPONSE     Pt was being treated for COPD Exacerbation with Asthma Exacerbation. Pt with potential COPD/Asthma overlap syndrome per pulmonology notes.     Isidoro Rossi MD , PGY-1   [address] Medicine   Senior Pager: [phone]   April 2, 2020, 4:30 PM

## 2020-04-02 NOTE — PROGRESS NOTES
D/C order noted for today. Orders reviewed. CM called [address] and confirmed with Dr. Link Portillo that no home health was needed for discharge today. Dr. Link Portillo said no home health needed for discharge today. CM spoke to PITER CHRISTENSEN Coordinator for the unit, and let her know that no CM needs at this time for this patient. CM remains available if needed.          Tan Morris, [phone]

## 2020-04-02 NOTE — DISCHARGE SUMMARY
[facility]  Discharge Summary    Patient: Elfego Miranda MRN: [id number]  CSN: [id number]    YOB: 1960  Age: 60 y.o. Sex: female      Admission Date: 3/30/2020 Discharge Date: 4/2/2020   Attending: Lynne Fleming MD PCP: Kyler De Guzman MD     ===================================================================    Reason for Admission: COPD exacerbation [facility]) [J44.1]    Discharge Diagnoses:   COPD exacerbation   SHERRIE/pulm htn  Insulin dependent Type 2 Diabetes  Hypertension, HFpEF  GERD  Allergic Rhinitis    Important notes to PCP/ follow-up studies and evaluations   -Was discharged on steroid taper 60mg(3tabs) for 3 days, 40mg(2tabs) for 3 days, 20mg(1 tab) for 3 days, and then 10mg (1/2 tab) for 3 days. Then resume 10mg every other day  -Pt discharged on Lantus 40 units for 7 days and then 35 units after that and novolog SSI, and accuchecks ACHS  -Pt to take Azithromycin 250mg PO daily from Monday-Friday indefinitely per Dr. Cindy Calvo recommendation.  -No changes made to outpatient inhalers or outpatient O2 requirements(3L NC, Trilogy while asleep) while admitted. -Per Dr. Cindy Calvo recommendations, would consider evaluating for biologics on discharge as pt with prior history of eosinophilic asthma. May address on office follow up with Pulmonology. Pending labs and studies:  None    Operative Procedures:   None    Discharge Medications:     Current Discharge Medication List      START taking these medications    Details   azithromycin (ZITHROMAX) 250 mg tablet Take 1 Tab by mouth daily. Monday-Friday. Qty: 30 Tab, Refills: 0    Associated Diagnoses: COPD exacerbation ([address]         CONTINUE these medications which have CHANGED    Details   insulin glargine (LANTUS,BASAGLAR) 100 unit/mL (3 mL) inpn 40 Units by SubCUTAneous route nightly. Please inject 40 units every bedtime. Decrease to 35 units every bedtime after 7 days of 40 units.   Indications: type 2 diabetes mellitus  Qty: 28 Units, Refills: 0      !! predniSONE (DELTASONE) 20 mg tablet Take 60mg(3 tabs) daily for 3 days, then 40mg(2 tabs) daily for 3 days, then 20mg(1 tab) daily for 3 days, 10mg (1/2 tab) daily for 3 days. Then resume 10mg tab every other day. Qty: 20 Tab, Refills: 0      !! predniSONE (DELTASONE) 10 mg tablet Take 10 mg by mouth every other day. Qty: 30 Tab, Refills: 3       !! - Potential duplicate medications found. Please discuss with provider. CONTINUE these medications which have NOT CHANGED    Details   lactobacillus sp. 50 billion cpu (BIO-K PLUS) 50 billion cell -375 mg cap capsule Take 1 Cap by mouth daily. Qty: 30 Cap, Refills: 0      simethicone (GAS-X) 125 mg capsule Take 125 mg by mouth four (4) times daily as needed for Flatulence. loratadine (CLARITIN) 10 mg tablet Take 10 mg by mouth daily as needed for Allergies. lisinopril (PRINIVIL, ZESTRIL) 20 mg tablet Take 20 mg by mouth two (2) times a day. albuterol sulfate (PROVENTIL;VENTOLIN) 2.5 mg/0.5 mL nebu nebulizer solution 0.5 mL by Nebulization route four (4) times daily as needed for Wheezing. To be used with HyperSal nebulizer solution. ICD code: COPD J44.1  Qty: 60 mL, Refills: 3      fluticasone propionate (FLONASE ALLERGY RELIEF) 50 mcg/actuation nasal spray 2 Sprays by Both Nostrils route daily. fluticasone propion-salmeterol (ADVAIR HFA) 230-21 mcg/actuation inhaler Take 2 Puffs by inhalation two (2) times a day. hydroCHLOROthiazide (HYDRODIURIL) 12.5 mg tablet Take 12.5 mg by mouth daily. tiotropium bromide (SPIRIVA RESPIMAT) 2.5 mcg/actuation inhaler Take 2 Puffs by inhalation daily. albuterol sulfate 90 mcg/actuation aepb Take 1 Puff by inhalation every four (4) hours.   Qty: 1 Inhaler, Refills: 0      NOVOLOG FLEXPEN U-100 INSULIN 100 unit/mL inpn Continue home Sliding scale insulin as prior to admission  Qty: 1 Pen, Refills: 0      omeprazole (PRILOSEC) 40 mg capsule Take 40 mg by mouth daily. Indications: gastroesophageal reflux disease      OXYGEN-AIR DELIVERY SYSTEMS 2 L by Nasal route continuous. First Choice      aspirin delayed-release 81 mg tablet Take 81 mg by mouth daily. montelukast (SINGULAIR) 10 mg tablet Take 10 mg by mouth nightly. PROLENSA 0.07 % ophthalmic solution Administer 1 Drop to right eye daily. Disposition: Home    Consultants:    Pulmonology- [facility] (including pertinent history and physical findings)  Ines De Dios is a 60 y.o. female with PMH of COPD on home O2, IDDM2, HTN, HFpEF, SHERRIE on CPAP, GERD, allergic rhinitis, who was admitted for COPD exacerbation.      COPD exacerbation  Likely 2/2 viral infx vs pneumonia vs seasonal allergies. Has hx of asthma/COPD on 2L home 02 and sleeps with trilogy machine. Followed by Dr. Sonja Natarajan in OP. Per last pulm note pt may have environmental triggers. Previously hospitalized 4x in 2019 for COPD exacerbations, This is third admission this month, most recent 3/23-3/25.  Symptoms started to worsen the night of 3/30/20 while still on 40 pred daily from last admission. No sick contacts, no GI symptoms, no fevers.  ABG showed primary respiratory acidosis consistent with chronicity of COPD on admission. D-dimer, Cardiac enzymes and BNP WNL. In ED, afebrile, clear CXR, normal WBCs. Satting 92-97% on 10 L bag valve mask. Speaking in very short sentences with significantly increased respiratory effort. In ED given 1 duoneb, mag, lasix and placed on bipap. Pt's ABG with pH 7.36, pCO2 47.7, pO2 241, HCO3: 27.4 on 10L non-re breather. Per Dr. Brigido Meckel recommendations, would consider evaluating for biologics on discharge as pt with prior history of eosinophilic asthma. May address on office follow up with Pulmonology after discharge. While admitted pt on BIPAP at night in place of Trilogy and 3L NC during the day.  Pt was also on Duonebs q4hrs scheduled, IV solumedrol 60mg for 2 days, Levaquin 750mg PO one dose and then transitioned to Azithromycin 500mg IV which was transitioned to Azithromycin 250mg PO at [facility]. Pt also received Arformotorol and Pulmicort nebulizer treatment BID and singular 10mg and loratadine. Pt discharged on prednisone taper 60mg(3tabs) for 3 days, 40mg(2tabs) for 3 days, 20mg(1 tab) for 3 days, and then 10mg (1/2 tab) for 3 days. Then resume 10mg every other day. Home Advair and Spiriva resumed at discharge.      SHERRIE/pulm htn  Intolerant to Cpap. On trilogy machine nightly at home. Pt on BIPAP qhs while admitted.     Insuline dependent Type 2 Diabetes  Home lantus 35u and novolog SSI. BS 320 on admission. Since pt on steroids and BGs in the 300s the Lantus was increased to 40 units and mealtime Lispro 4 units per meal +SSI were started. Pt also had Accuchecks ACHS. Pt discharged on Lantus 40units daily for 7 days and Lantus 35 units daily from then onwards since pt on steroid taper. Mealtime insulin discontinued to prevent hypoglycemia. Continue home Novolog SSI. Hypertension, HFpEF  Stable. Echo 7/7/18 EF 66-70%, LV mild concentric hypertrophy, No left regional wall motion abnorm. SBP in ED elevated 190s improved to 110s- 130s during this admission. Continued home lisinopril 20mg BID and HCTZ 12.5mg daily while admitted.      GERD  Pt takes omeprazole 40mg daily and pepcid for breakthrough symptoms at home. Pt on pantoprazole 40mg daily before breakfast while admitted. Omeprazole 40mg daily and pepcid for breakthrough symptoms resumed at discharge.      Allergic Rhinitis  Continued home flonase and singulair 10mg daily while admitted.      CURRENT ADMISSION IMAGING RESULTS   Xr Chest Port    Result Date: 3/30/2020  IMPRESSION: No acute disease. Stable scarring at the lung bases.          Cardiology Procedures/Testing:  MODALITY RESULTS   EKG Results for orders placed or performed during the hospital encounter of 03/30/20   EKG, 12 LEAD, INITIAL Result Value Ref Range    Ventricular Rate 80 BPM    Atrial Rate 80 BPM    P-R Interval 150 ms    QRS Duration 84 ms    Q-T Interval 372 ms    QTC Calculation (Bezet) 429 ms    Calculated P Axis 41 degrees    Calculated R Axis 22 degrees    Calculated T Axis 54 degrees    Diagnosis       Normal sinus rhythm  Normal ECG  When compared with ECG of 13-MAR-2020 22:11,  No significant change was found  Confirmed by Morelia Nur ([id number]) on 3/31/2020 7:55:48 AM         ECHO 07/06/18   ECHO ADULT COMPLETE 07/07/2018 7/7/2018    Narrative · Definity contrast was given to enhance imaging. · Left ventricular hyperdynamic systolic function. Estimated left   ventricular ejection fraction is 66 - 70%. Left ventricular mild   concentric hypertrophy observed. Normal left ventricular wall motion, no   regional wall motion abnormality noted. Age-appropriate left ventricular   diastolic function. · Right ventricle not well visualized. · Aortic valve is probably trileaflet. Signed by: Daniela Casiano MD      Nuclear Medicine [place] [facility] [place]   NM LUNG PERFUSION W VENT    Impression IMPRESSION:    Low probability of pulmonary embolism. Results from [place] encounter on 09/04/13   TRANSCRIBED NUCLEAR MEDICINE   Results from Hospital Encounter encounter on 12/06/12   NM LUNG VENT/PERF    Impression IMPRESSION:    Very low probability for pulmonary embolus. IR No results found for this or any previous visit.    CATH       Special Testing/Procedures:  MODALITY RESULTS   MICRO All Micro Results     Procedure Component Value Units Date/Time    CULTURE, RESPIRATORY/SPUTUM/BRONCH Parvin Ban [[id number]] Collected:  03/30/20 2100    Order Status:  Canceled Specimen:  Sputum          ABG Lab Results   Component Value Date/Time    pH (POC) 7.364 03/30/2020 06:28 PM    pCO2 (POC) 47.7 (H) 03/30/2020 06:28 PM    pO2 (POC) 241 (H) 03/30/2020 06:28 PM    HCO3 (POC) 27.4 (H) 03/30/2020 06:28 PM    FIO2 (POC) 50 03/30/2020 06:28 PM      UA No results found for this or any previous visit. JESUS [unfilled]   @LIZ@    PATH      Laboratory Results:  LABORATORY RESULTS   HEMATOLOGY Lab Results   Component Value Date/Time    WBC 12.0 04/02/2020 02:29 AM    HGB 12.5 04/02/2020 02:29 AM    HCT 37.4 04/02/2020 02:29 AM    PLATELET 659 84/96/9206 02:29 AM    MCV 88.8 04/02/2020 02:29 AM       CHEMISTRIES Lab Results   Component Value Date/Time    Sodium 137 04/02/2020 02:29 AM    Potassium 3.5 04/02/2020 02:29 AM    Chloride 101 04/02/2020 02:29 AM    CO2 31 04/02/2020 02:29 AM    Anion gap 5 04/02/2020 02:29 AM    Glucose 214 (H) 04/02/2020 02:29 AM    BUN 21 (H) 04/02/2020 02:29 AM    Creatinine 1.04 04/02/2020 02:29 AM    BUN/Creatinine ratio 20 04/02/2020 02:29 AM    GFR est AA >60 04/02/2020 02:29 AM    GFR est non-AA 54 (L) 04/02/2020 02:29 AM    Calcium 9.4 04/02/2020 02:29 AM      HEPATIC FUNCTION Lab Results   Component Value Date/Time    Albumin 3.8 03/30/2020 06:02 PM    Bilirubin, direct <0.1 02/08/2017 10:00 PM    Bilirubin, total 0.5 03/30/2020 06:02 PM    Protein, total 7.6 03/30/2020 06:02 PM    Globulin 3.8 03/30/2020 06:02 PM    A-G Ratio 1.0 03/30/2020 06:02 PM    AST (SGOT) 32 03/30/2020 06:02 PM    ALT (SGPT) 64 (H) 03/30/2020 06:02 PM    Alk.  phosphatase 90 03/30/2020 06:02 PM       LACTIC ACID Lab Results   Component Value Date/Time    Lactic acid 2.0 09/04/2019 06:10 AM    Lactic acid 3.1 (HH) 09/03/2019 09:39 PM    Lactic acid 3.1 (HH) 09/03/2019 04:56 PM      CARDIAC PANEL Lab Results   Component Value Date/Time    CK 90 09/20/2019 06:40 PM    CK - MB 1.3 09/20/2019 06:40 PM    CK-MB Index 1.4 09/20/2019 06:40 PM    Troponin-I, QT <0.02 03/30/2020 06:02 PM      NT-proBNP Lab Results   Component Value Date/Time    NT pro- 03/30/2020 06:02 PM    NT pro-BNP 35 03/23/2020 03:13 PM    NT pro- 03/04/2020 06:15 PM    NT pro-BNP 48 02/29/2020 05:16 PM    NT pro- 01/13/2020 07:15 PM      THYROID Lab Results   Component Value Date/Time    TSH 2.76 09/18/2016 02:20 PM      LIPID PANEL Lab Results   Component Value Date/Time    Cholesterol, total 220 (H) 11/20/2012 03:22 AM    HDL Cholesterol 62 (H) 11/20/2012 03:22 AM    LDL, calculated 144.6 (H) 11/20/2012 03:22 AM    VLDL, calculated 13.4 11/20/2012 03:22 AM    Triglyceride 67 11/20/2012 03:22 AM    CHOL/HDL Ratio 3.5 11/20/2012 03:22 AM         RISK CALCULATORS:  SCORE RESULT   ASCVD The ASCVD Risk score (Kaycee Mohr, et al., 2013) failed to calculate for the following reasons:    ASCVD risk score not calculated    [id number]     HAS-BLED    READMISSION RISK SCORE High Risk            40       Total Score        3 Has Seen PCP in Last 6 Months (Yes=3, No=0)    11 IP Visits Last 12 Months (1-3=4, 4=9, >4=11)    9 Pt. Coverage (Medicare=5 , Medicaid, or Self-Pay=4)    17 Charlson Comorbidity Score (Age + Comorbid Conditions)        Criteria that do not apply:    . Living with Significant Other. Assisted Living. LTAC. SNF.  or   Rehab    Patient Length of Stay (>5 days = 3)           Functional status and cognitive function:    Ambulates without assistance  Status: alert, cooperative, no distress, appears stated age  Condition: STABLE  Disposition: Home    Diet: Diabetic Diet    Code status and advanced care plan: Full    Point of Contact Caryn Curran  Relationship: Daughter  [phone]     Patient Education:  Patient was educated on the following topics prior to discharge:Azithromycin and close follow-up outpatient     Follow-up:   Follow-up Information     Follow up With Specialties Details Why Contact Info    Nathan Win MD Pulmonary Disease, Internal Medicine Go on 4/6/2020 Follow up @10:45am [address] [facility]      Cal Melissa MD  On 4/3/2020 Follow up @1:20pm [address] [facility] ISREAL Perez Joniyeyo Carvalhocortez, [address]   [address] [facility]  [address]  [phone]            =================================================================  Cortes Edwards MD, PGY-1   [address] Medicine   Intern Pager: [phone]   April 2, 2020, 9:40 AM

## 2020-04-02 NOTE — DIABETES MGMT
NUTRITIONAL ASSESSMENT GLYCEMIC CONTROL/ PLAN OF CARE     Carlos Duncan           60 y.o.           3/30/2020                 1. Acute exacerbation of chronic obstructive pulmonary disease (COPD) ([address]    2. COPD exacerbation (HCC)       INTERVENTIONS/PLAN:   Continue inpatient monitoring and intervention   ASSESSMENT:   Patient is 60year old with past medical history including type 2 diabetes, asthma, COPD, GERD, hyperlipidemia, and hyperlipidemia who presented with worsening shortness of breath. Blood glucose has fluctuated above targets, likely related to steroids. Noted steroids have been reduced. Lantus insulin continue to be adjusted. Noted plan for discharge home today. Diabetes Management:   Recent blood glucose:     4/1/2020 15:39 4/1/2020 21:29 4/2/2020 06:06 4/2/2020 11:31   319 (H) 256 (H) 143 (H) 228 (H)   Within target range (non-ICU: <140; ICU<180): [] Yes   [x]  No    Current Insulin regimen:   Lantus insulin 40 units daily at bedtime. Correctional lispro insulin ACHS. Very resistant dose. Home medication/insulin regimen:   Basaglar insulin 35 units daily at bedtime. Novolog pen sliding scale insulin TID AC.   HbA1c: 8.2%  Adequate glycemic control PTA:  [] Yes  [x] No     SUBJECTIVE/OBJECTIVE:   Diet: diabetic consistent carbohydrate     Patient Vitals for the past 100 hrs:   % Diet Eaten   04/01/20 1915 80 %   04/01/20 0830 80 %   03/31/20 2035 0 %   03/31/20 1805 80 %   03/31/20 1235 80 %     Medications: [x] Reviewed     Most Recent POC Glucose:   Recent Labs     04/02/20  0229 04/01/20  0521 03/31/20  1019 03/30/20  1802   * 304* 322* 320*      Labs:   Lab Results   Component Value Date/Time    Hemoglobin A1c 8.2 (H) 03/05/2020 05:41 AM     Lab Results   Component Value Date/Time    Sodium 137 04/02/2020 02:29 AM    Potassium 3.5 04/02/2020 02:29 AM    Chloride 101 04/02/2020 02:29 AM    CO2 31 04/02/2020 02:29 AM    Anion gap 5 04/02/2020 02:29 AM    Glucose 214 (H) 04/02/2020 02:29 AM    BUN 21 (H) 04/02/2020 02:29 AM    Creatinine 1.04 04/02/2020 02:29 AM    Calcium 9.4 04/02/2020 02:29 AM    Magnesium 1.6 03/23/2020 03:13 PM    Phosphorus 3.1 01/19/2019 06:51 AM    Albumin 3.8 03/30/2020 06:02 PM     Anthropometrics: BMI (calculated): 45  Wt Readings from Last 1 Encounters:   04/01/20 115.1 kg (253 lb 12.8 oz)      Ht Readings from Last 1 Encounters:   04/01/20 5' 3\" (1.6 m)     Estimated Nutrition Needs: 1613-1414 Kcal/day,  grams protein/day    Based on:   [x] Actual BW    [] IBW   []  Adjusted BW      Nutrition Diagnoses:    Altered nutrition related lab value related to diabetes as evidenced by Hemoglobin A1c of 8.2%  Nutrition Interventions: diabetic diet, coordination of care   Goal: Blood glucose will be within target range of  mg/dL by 4/03/20     Nutrition Monitoring and Evaluation    []     Monitor po intake on meal rounds  [x]     Continue inpatient monitoring and intervention  []     Other:    Jared Burkett, [address], [address]

## 2020-04-02 NOTE — PROGRESS NOTES
1915: Assumed patient care from [address] Patient is alert and oriented to person, place, time and situation. Respiratory status is stable on 3L via nasal cannula. Vital signs are stable. MEWS score is a one. Patient denies any pain, discomfort, nausea vomiting dizziness or anxiety. White board and fall card is updated. Bed is locked and in lowest position. Call bell, water and personal belongings are within reach. Patient has no questions, comments or concerns after bedside shift report. 0700: Patient had an uneventful shift. Respiratory status, vital signs and MEWS score remained stable. Patient was resting quietly with no signs of distress noted. Bed locked and in lowest position. Call bell water and personal belongings were within reach. Patient had no questions, comments or concerns after bedside shift report.  Bedside report given to [facility] CANTU

## 2020-04-02 NOTE — PROGRESS NOTES
Discharge instructions given verbally and written all questions answered IV, tele, and armband discontinued prescriptions given to be filled at pt's local pharmacy awaiting daughter to transport pt home    As part of the discharge instructions, medications already given today were discussed with the patient. The next dose due of all ordered meds was highlighted as part of the medication discharge instructions. Discussed with the patient the importance of taking medications as directed, as well as the side effects and adverse reactions to medications ordered.

## 2020-04-02 NOTE — PROGRESS NOTES
Progress Note    Patient: Andrez Ugarte MRN: [id number]  SSN: [id number]    YOB: 1960  Age: 60 y.o. Sex: female      Admit Date: 3/30/2020    LOS: 3 days     Subjective:     Pt on 3L NC this AM and stated that she was feeling much better this morning. Review of Systems   Respiratory: Positive for shortness of breath. Negative for cough and wheezing. Cardiovascular: Negative for chest pain and palpitations. Gastrointestinal: Negative for nausea and vomiting. Objective:     Vitals:    04/02/20 0000 04/02/20 0400 04/02/20 0416 04/02/20 0738   BP: 131/63 117/73  111/65   Pulse: 78 60  (!) 57   Resp: 18 16  16   Temp: 97.3 °F (36.3 °C) 97.3 °F (36.3 °C)  97.4 °F (36.3 °C)   SpO2: 96% 100% 99% 100%   Weight:       Height:            Intake and Output:  Current Shift: No intake/output data recorded. Last three shifts: 03/31 1901 - 04/02 0700  In: 720 [P.O.:720]  Out: 0     Physical Exam:   General:  AAOx3, On 3L NC this AM  CV:  RRR, no murmurs gallops, or rubs  RESP:  Lungs with improved air movement throughout. Decreased wheezes compared to 3/31/20. ABD:  Soft, nontender, nondistended. BS (+). Ext:  Trace bilateral pitting edema. 2+ radial and dp pulses bilaterally. Skin:  No rashes, lesions, or ulcers. Good turgor.     Lab/Data Review:  Recent Results (from the past 24 hour(s))   GLUCOSE, POC    Collection Time: 04/01/20 11:25 AM   Result Value Ref Range    Glucose (POC) 365 (H) 70 - 110 mg/dL   GLUCOSE, POC    Collection Time: 04/01/20  3:39 PM   Result Value Ref Range    Glucose (POC) 319 (H) 70 - 110 mg/dL   GLUCOSE, POC    Collection Time: 04/01/20  9:29 PM   Result Value Ref Range    Glucose (POC) 256 (H) 70 - 451 mg/dL   METABOLIC PANEL, BASIC    Collection Time: 04/02/20  2:29 AM   Result Value Ref Range    Sodium 137 136 - 145 mmol/L    Potassium 3.5 3.5 - 5.5 mmol/L    Chloride 101 100 - 111 mmol/L    CO2 31 21 - 32 mmol/L    Anion gap 5 3.0 - 18 mmol/L Glucose 214 (H) 74 - 99 mg/dL    BUN 21 (H) 7.0 - 18 MG/DL    Creatinine 1.04 0.6 - 1.3 MG/DL    BUN/Creatinine ratio 20 12 - 20      GFR est AA >60 >60 ml/min/1.73m2    GFR est non-AA 54 (L) >60 ml/min/1.73m2    Calcium 9.4 8.5 - 10.1 MG/DL   CBC WITH AUTOMATED DIFF    Collection Time: 04/02/20  2:29 AM   Result Value Ref Range    WBC 12.0 4.6 - 13.2 K/uL    RBC 4.21 4.20 - 5.30 M/uL    HGB 12.5 12.0 - 16.0 g/dL    HCT 37.4 35.0 - 45.0 %    MCV 88.8 74.0 - 97.0 FL    MCH 29.7 24.0 - 34.0 PG    MCHC 33.4 31.0 - 37.0 g/dL    RDW 14.0 11.6 - 14.5 %    PLATELET 874 341 - 747 K/uL    MPV 9.3 9.2 - 11.8 FL    NEUTROPHILS 70 40 - 73 %    LYMPHOCYTES 22 21 - 52 %    MONOCYTES 8 3 - 10 %    EOSINOPHILS 0 0 - 5 %    BASOPHILS 0 0 - 2 %    ABS. NEUTROPHILS 8.4 (H) 1.8 - 8.0 K/UL    ABS. LYMPHOCYTES 2.6 0.9 - 3.6 K/UL    ABS. MONOCYTES 1.0 0.05 - 1.2 K/UL    ABS. EOSINOPHILS 0.0 0.0 - 0.4 K/UL    ABS. BASOPHILS 0.0 0.0 - 0.1 K/UL    DF AUTOMATED     GLUCOSE, POC    Collection Time: 04/02/20  6:06 AM   Result Value Ref Range    Glucose (POC) 143 (H) 70 - 110 mg/dL     Xr Chest Port    Result Date: 3/30/2020  IMPRESSION: No acute disease. Stable scarring at the lung bases.      Current Facility-Administered Medications   Medication Dose Route Frequency    insulin glargine (LANTUS) injection 40 Units  40 Units SubCUTAneous QHS    insulin lispro (HUMALOG) injection 4 Units  4 Units SubCUTAneous TIDAC    predniSONE (DELTASONE) tablet 60 mg  60 mg Oral DAILY WITH BREAKFAST    azithromycin (ZITHROMAX) tablet 250 mg  250 mg Oral DAILY    ondansetron (ZOFRAN ODT) tablet 4 mg  4 mg Oral Q8H PRN    albuterol-ipratropium (DUO-NEB) 2.5 MG-0.5 MG/3 ML  3 mL Nebulization Q4H PRN    aspirin delayed-release tablet 81 mg  81 mg Oral DAILY    fluticasone propionate (FLONASE) 50 mcg/actuation nasal spray 2 Spray  2 Spray Both Nostrils DAILY    hydroCHLOROthiazide (HYDRODIURIL) tablet 12.5 mg  12.5 mg Oral DAILY    lactobacillus sp. 50 billion cpu (BIO-K PLUS) capsule 1 Cap  1 Cap Oral DAILY    lisinopriL (PRINIVIL, ZESTRIL) tablet 20 mg  20 mg Oral BID    loratadine (CLARITIN) tablet 10 mg  10 mg Oral DAILY PRN    montelukast (SINGULAIR) tablet 10 mg  10 mg Oral QHS    pantoprazole (PROTONIX) tablet 40 mg  40 mg Oral ACB    ketorolac (ACULAR) 0.5 % ophthalmic solution   Right Eye QID    simethicone (GAS-X) capsule 125 mg  125 mg Oral QID PRN    acetaminophen (TYLENOL) tablet 650 mg  650 mg Oral Q4H PRN    heparin (porcine) injection 5,000 Units  5,000 Units SubCUTAneous Q8H    glucose chewable tablet 16 g  16 g Oral PRN    glucagon (GLUCAGEN) injection 1 mg  1 mg IntraMUSCular PRN    dextrose (D50W) injection syrg 12.5-25 g  25-50 mL IntraVENous PRN    insulin lispro (HUMALOG) injection   SubCUTAneous AC&HS    arformoteroL (BROVANA) neb solution 15 mcg  15 mcg Nebulization BID RT    budesonide (PULMICORT) 500 mcg/2 ml nebulizer suspension  1,000 mcg Nebulization BID RT    albuterol-ipratropium (DUO-NEB) 2.5 MG-0.5 MG/3 ML  3 mL Nebulization Q4H RT    albuterol-ipratropium (DUO-NEB) 2.5 MG-0.5 MG/3 ML  3 mL Nebulization Q1H PRN         Assessment/Plan:   Annel De Dios is a 60 y.o. female with PMH of COPD on home O2, IDDM2, HTN, HFpEF, SHERRIE on CPAP, GERD, allergic rhinitis, now admitted for COPD exacerbation.      COPD exacerbation  Likely 2/2 viral infx vs pneumonia vs seasonal allergies. Has hx of asthma/COPD on 2L home 02 and sleeps with panda. Followed by Dr. Cody Peace in OP. Per last pulm note pt may have environmental triggers. Previously hospitalized 4x in 2019 for COPD exacerbations, This is third admission this month, most recent 3/23-3/25. Symptoms started to worsen the night of 3/30/20 while still on 40 pred daily from last admission. No sick contacts, no GI symptoms, no fevers. ABG showed primary respiratory acidosis consistent with chronicity of COPD. D-dimer, Cardiac enzymes and BNP WNL.  In ED, afebrile, clear CXR, normal WBCs. Satting 92-97% on 10 L bag valve mask. Speaking in very short sentences with significantly increased respiratory effort. In ED given 1 duoneb, mag, lasix and placed on bipap. Pt's ABG with pH 7.36, pCO2 47.7, pO2 241, HCO3: 27.4 on 10L non-re breather.  - Continue BIPAP overnight in place of Telergy that pt uses at home  - Continue Supplemental O2 titrate to POx of above 88-92%  - CBC, BMP daily  - Duonebs q4hrs scheduled  - Discontinue IV solumedrol 60 4/2/20  - Start Prednisone 60mg PO daily  - Levaquin 750 daily(one dose 3/30/20)  - Discontinue Azithromycin 500mg IV daily  - Start Azithromycin 250mg PO daily. - consult to CM/PT/OT  - Fall precautions  - Continue home Singulair and loratadine  - Home advair>arformotorol+budesonide nebulizer BID  - Consider prolonged steroid taper  - Consulted Pulmonology, appreciate recommendations     SHERRIE/pulm htn  Intolerant to Cpap. On trellegy nightly at home  - Continue bipap qhs     Insuline dependent Type 2 Diabetes  Home lantus 35u and novolog SSI. BS 320 on admission. Pt will be NPO while on BIPAP but also starting steroids and pt has been poorly controlled outpatient.  -Lantus increased lantus to 40 units  -Started mealtime Lispro 4 units per meal +SSI   -poc glucose achs     Hypertension, HFpEF  Stable.  Echo 7/7/18 EF 66-70%, LV mild concentric hypertrophy, No left regional wall motion abnorm. SBP in ED elevated 190s improved to 140's-160's.  -continue home lisinopril 20mg bid, HCTZ 12.5mg daily     GERD  Takes omeprazole 40mg daily and pepcid for breakthrough symptoms.   -continue pantoprazole 40mg daily before breakfast while admitted      Allergic Rhinitis  -home flonase, singulair 10mg daily      Diet: Diabetic diet   DVT Prophylaxis: [facility]  Code Status: Full Code  [place] [address], daughter, [phone]     Disposition and anticipated LOS:  3-4 midnights       Signed By: Prasad Avila MD     April 2, 2020

## 2020-04-03 ENCOUNTER — PATIENT OUTREACH (OUTPATIENT)
Dept: CASE MANAGEMENT | Age: 60
End: 2020-04-03

## 2020-04-03 NOTE — PROGRESS NOTES
COVID-19 Screening Initial Follow-up Note    Patient contacted regarding COVID-19  risk. Care Transition Nurse/ Ambulatory Care Manager contacted the patient by telephone to perform post discharge assessment. Verified name and  with patient as identifiers. Provided introduction to self, and explanation of the CTN/ACM role, and reason for call due to risk factors for infection and/or exposure to COVID-19. Symptoms reviewed with patient who verbalized the following symptoms:     - Fatigue, as usual   - Increased breathing, as usual   - Dizziness/feeling lightheaded    Patient states she saw her PCP today and symptom of feeling/lightheaded was   discussed. Due to no new or worsening symptoms encounter was not routed to provider for escalation. Patient has following risk factors of:   COPD       CTN/ACM reviewed discharge instructions, medical action plan and red flags such as increased shortness of breath, increasing fever and signs of decompensation with patient who verbalized understanding. Patient declined discussion re: Covid 19. As Covid 19 information was reviewed with her during the last phone call. Patient does not have any questions re: Covid 19 at this time. Medications:   Patient states that the nurse at [facility] was wonderful and she does not have any questions or concerns regarding her medications. Patient thanked CTN and ended the phoen call.        Plan for follow-up call in 5-7 days based on severity of symptoms and risk factors

## 2020-04-08 ENCOUNTER — HOSPITAL ENCOUNTER (INPATIENT)
Age: 60
LOS: 4 days | Discharge: HOME | DRG: 191 | End: 2020-04-12
Attending: EMERGENCY MEDICINE | Admitting: FAMILY MEDICINE
Payer: MEDICARE

## 2020-04-08 ENCOUNTER — APPOINTMENT (OUTPATIENT)
Dept: CT IMAGING | Age: 60
DRG: 191 | End: 2020-04-08
Attending: EMERGENCY MEDICINE
Payer: MEDICARE

## 2020-04-08 ENCOUNTER — HOSPITAL ENCOUNTER (EMERGENCY)
Age: 60
Discharge: HOME | DRG: 191 | End: 2020-04-08
Attending: EMERGENCY MEDICINE
Payer: MEDICARE

## 2020-04-08 VITALS
BODY MASS INDEX: 44.83 KG/M2 | TEMPERATURE: 97 F | SYSTOLIC BLOOD PRESSURE: 144 MMHG | OXYGEN SATURATION: 99 % | HEIGHT: 63 IN | WEIGHT: 253 LBS | HEART RATE: 57 BPM | RESPIRATION RATE: 24 BRPM | DIASTOLIC BLOOD PRESSURE: 62 MMHG

## 2020-04-08 DIAGNOSIS — J44.1 COPD EXACERBATION (HCC): Primary | ICD-10-CM

## 2020-04-08 DIAGNOSIS — R10.31 ABDOMINAL PAIN, RIGHT LOWER QUADRANT: Primary | ICD-10-CM

## 2020-04-08 LAB
ALBUMIN SERPL-MCNC: 3.7 G/DL (ref 3.4–5)
ALBUMIN/GLOB SERPL: 1 {RATIO} (ref 0.8–1.7)
ALP SERPL-CCNC: 70 U/L (ref 45–117)
ALT SERPL-CCNC: 35 U/L (ref 13–56)
ANION GAP SERPL CALC-SCNC: 8 MMOL/L (ref 3–18)
ARTERIAL PATENCY WRIST A: ABNORMAL
AST SERPL-CCNC: 14 U/L (ref 10–38)
BASE EXCESS BLDV CALC-SCNC: 4 MMOL/L
BASOPHILS # BLD: 0 K/UL (ref 0–0.1)
BASOPHILS NFR BLD: 0 % (ref 0–2)
BDY SITE: ABNORMAL
BILIRUB SERPL-MCNC: 0.6 MG/DL (ref 0.2–1)
BUN SERPL-MCNC: 22 MG/DL (ref 7–18)
BUN/CREAT SERPL: 21 (ref 12–20)
CALCIUM SERPL-MCNC: 9.3 MG/DL (ref 8.5–10.1)
CHLORIDE SERPL-SCNC: 98 MMOL/L (ref 100–111)
CO2 SERPL-SCNC: 30 MMOL/L (ref 21–32)
CREAT SERPL-MCNC: 1.03 MG/DL (ref 0.6–1.3)
DIFFERENTIAL METHOD BLD: ABNORMAL
EOSINOPHIL # BLD: 0.1 K/UL (ref 0–0.4)
EOSINOPHIL NFR BLD: 0 % (ref 0–5)
ERYTHROCYTE [DISTWIDTH] IN BLOOD BY AUTOMATED COUNT: 14 % (ref 11.6–14.5)
GAS FLOW.O2 O2 DELIVERY SYS: ABNORMAL L/MIN
GAS FLOW.O2 SETTING OXYMISER: 2 L/M
GLOBULIN SER CALC-MCNC: 3.8 G/DL (ref 2–4)
GLUCOSE SERPL-MCNC: 272 MG/DL (ref 74–99)
HCO3 BLDV-SCNC: 28.8 MMOL/L (ref 23–28)
HCT VFR BLD AUTO: 40.6 % (ref 35–45)
HGB BLD-MCNC: 13.8 G/DL (ref 12–16)
LYMPHOCYTES # BLD: 2.8 K/UL (ref 0.9–3.6)
LYMPHOCYTES NFR BLD: 23 % (ref 21–52)
MCH RBC QN AUTO: 30.4 PG (ref 24–34)
MCHC RBC AUTO-ENTMCNC: 34 G/DL (ref 31–37)
MCV RBC AUTO: 89.4 FL (ref 74–97)
MONOCYTES # BLD: 1 K/UL (ref 0.05–1.2)
MONOCYTES NFR BLD: 8 % (ref 3–10)
NEUTS SEG # BLD: 8.3 K/UL (ref 1.8–8)
NEUTS SEG NFR BLD: 69 % (ref 40–73)
PCO2 BLDV: 44.1 MMHG (ref 41–51)
PH BLDV: 7.42 [PH] (ref 7.32–7.42)
PLATELET # BLD AUTO: 305 K/UL (ref 135–420)
PMV BLD AUTO: 9.3 FL (ref 9.2–11.8)
PO2 BLDV: 53 MMHG (ref 25–40)
POTASSIUM SERPL-SCNC: 4.2 MMOL/L (ref 3.5–5.5)
PROT SERPL-MCNC: 7.5 G/DL (ref 6.4–8.2)
RBC # BLD AUTO: 4.54 M/UL (ref 4.2–5.3)
SAO2 % BLDV: 87 % (ref 65–88)
SERVICE CMNT-IMP: ABNORMAL
SODIUM SERPL-SCNC: 136 MMOL/L (ref 136–145)
SPECIMEN TYPE: ABNORMAL
TOTAL RESP. RATE, ITRR: 20
WBC # BLD AUTO: 12.1 K/UL (ref 4.6–13.2)

## 2020-04-08 PROCEDURE — 99285 EMERGENCY DEPT VISIT HI MDM: CPT

## 2020-04-08 PROCEDURE — 80053 COMPREHEN METABOLIC PANEL: CPT

## 2020-04-08 PROCEDURE — 96374 THER/PROPH/DIAG INJ IV PUSH: CPT

## 2020-04-08 PROCEDURE — 96365 THER/PROPH/DIAG IV INF INIT: CPT

## 2020-04-08 PROCEDURE — 65660000000 HC RM CCU STEPDOWN

## 2020-04-08 PROCEDURE — 74011000250 HC RX REV CODE- 250: Performed by: STUDENT IN AN ORGANIZED HEALTH CARE EDUCATION/TRAINING PROGRAM

## 2020-04-08 PROCEDURE — 65270000029 HC RM PRIVATE

## 2020-04-08 PROCEDURE — 82803 BLOOD GASES ANY COMBINATION: CPT

## 2020-04-08 PROCEDURE — 74011250636 HC RX REV CODE- 250/636: Performed by: STUDENT IN AN ORGANIZED HEALTH CARE EDUCATION/TRAINING PROGRAM

## 2020-04-08 PROCEDURE — 85025 COMPLETE CBC W/AUTO DIFF WBC: CPT

## 2020-04-08 PROCEDURE — 96375 TX/PRO/DX INJ NEW DRUG ADDON: CPT

## 2020-04-08 PROCEDURE — 74011250636 HC RX REV CODE- 250/636: Performed by: EMERGENCY MEDICINE

## 2020-04-08 PROCEDURE — 74176 CT ABD & PELVIS W/O CONTRAST: CPT

## 2020-04-08 PROCEDURE — 94640 AIRWAY INHALATION TREATMENT: CPT

## 2020-04-08 RX ORDER — BUDESONIDE 0.25 MG/2ML
250 INHALANT ORAL
Status: DISCONTINUED | OUTPATIENT
Start: 2020-04-08 | End: 2020-04-12 | Stop reason: HOSPADM

## 2020-04-08 RX ORDER — AZITHROMYCIN 250 MG/1
250 TABLET, FILM COATED ORAL DAILY
Status: DISCONTINUED | OUTPATIENT
Start: 2020-04-09 | End: 2020-04-09

## 2020-04-08 RX ORDER — IPRATROPIUM BROMIDE AND ALBUTEROL SULFATE 2.5; .5 MG/3ML; MG/3ML
9 SOLUTION RESPIRATORY (INHALATION)
Status: COMPLETED | OUTPATIENT
Start: 2020-04-08 | End: 2020-04-08

## 2020-04-08 RX ORDER — LISINOPRIL 20 MG/1
20 TABLET ORAL EVERY 12 HOURS
Status: DISCONTINUED | OUTPATIENT
Start: 2020-04-08 | End: 2020-04-12 | Stop reason: HOSPADM

## 2020-04-08 RX ORDER — MORPHINE SULFATE 2 MG/ML
4 INJECTION, SOLUTION INTRAMUSCULAR; INTRAVENOUS ONCE
Status: COMPLETED | OUTPATIENT
Start: 2020-04-08 | End: 2020-04-08

## 2020-04-08 RX ORDER — PANTOPRAZOLE SODIUM 40 MG/1
40 TABLET, DELAYED RELEASE ORAL DAILY
Status: DISCONTINUED | OUTPATIENT
Start: 2020-04-09 | End: 2020-04-12 | Stop reason: HOSPADM

## 2020-04-08 RX ORDER — IPRATROPIUM BROMIDE AND ALBUTEROL SULFATE 2.5; .5 MG/3ML; MG/3ML
3 SOLUTION RESPIRATORY (INHALATION)
Status: DISCONTINUED | OUTPATIENT
Start: 2020-04-08 | End: 2020-04-09

## 2020-04-08 RX ORDER — DEXTROSE 50 % IN WATER (D50W) INTRAVENOUS SYRINGE
25-50 AS NEEDED
Status: DISCONTINUED | OUTPATIENT
Start: 2020-04-08 | End: 2020-04-12 | Stop reason: HOSPADM

## 2020-04-08 RX ORDER — HYDROCHLOROTHIAZIDE 25 MG/1
12.5 TABLET ORAL DAILY
Status: DISCONTINUED | OUTPATIENT
Start: 2020-04-09 | End: 2020-04-12 | Stop reason: HOSPADM

## 2020-04-08 RX ORDER — ASPIRIN 81 MG/1
81 TABLET ORAL DAILY
Status: DISCONTINUED | OUTPATIENT
Start: 2020-04-09 | End: 2020-04-12 | Stop reason: HOSPADM

## 2020-04-08 RX ORDER — MAGNESIUM SULFATE HEPTAHYDRATE 40 MG/ML
2 INJECTION, SOLUTION INTRAVENOUS ONCE
Status: COMPLETED | OUTPATIENT
Start: 2020-04-08 | End: 2020-04-08

## 2020-04-08 RX ORDER — MAGNESIUM SULFATE 100 %
4 CRYSTALS MISCELLANEOUS AS NEEDED
Status: DISCONTINUED | OUTPATIENT
Start: 2020-04-08 | End: 2020-04-12 | Stop reason: HOSPADM

## 2020-04-08 RX ORDER — ARFORMOTEROL TARTRATE 15 UG/2ML
15 SOLUTION RESPIRATORY (INHALATION)
Status: DISCONTINUED | OUTPATIENT
Start: 2020-04-08 | End: 2020-04-12 | Stop reason: HOSPADM

## 2020-04-08 RX ORDER — HEPARIN SODIUM 5000 [USP'U]/ML
5000 INJECTION, SOLUTION INTRAVENOUS; SUBCUTANEOUS EVERY 8 HOURS
Status: DISCONTINUED | OUTPATIENT
Start: 2020-04-08 | End: 2020-04-12 | Stop reason: HOSPADM

## 2020-04-08 RX ORDER — FLUTICASONE PROPIONATE 50 MCG
2 SPRAY, SUSPENSION (ML) NASAL DAILY
Status: DISCONTINUED | OUTPATIENT
Start: 2020-04-09 | End: 2020-04-12 | Stop reason: HOSPADM

## 2020-04-08 RX ORDER — FUROSEMIDE 20 MG/1
40 TABLET ORAL DAILY
COMMUNITY
End: 2020-12-03 | Stop reason: SDUPTHER

## 2020-04-08 RX ORDER — INSULIN GLARGINE 100 [IU]/ML
35 INJECTION, SOLUTION SUBCUTANEOUS
Status: DISCONTINUED | OUTPATIENT
Start: 2020-04-08 | End: 2020-04-12 | Stop reason: HOSPADM

## 2020-04-08 RX ORDER — INSULIN LISPRO 100 [IU]/ML
INJECTION, SOLUTION INTRAVENOUS; SUBCUTANEOUS
Status: DISCONTINUED | OUTPATIENT
Start: 2020-04-08 | End: 2020-04-12 | Stop reason: HOSPADM

## 2020-04-08 RX ADMIN — METHYLPREDNISOLONE SODIUM SUCCINATE 125 MG: 125 INJECTION, POWDER, FOR SOLUTION INTRAMUSCULAR; INTRAVENOUS at 21:52

## 2020-04-08 RX ADMIN — IPRATROPIUM BROMIDE AND ALBUTEROL SULFATE 9 ML: .5; 3 SOLUTION RESPIRATORY (INHALATION) at 21:30

## 2020-04-08 RX ADMIN — MORPHINE SULFATE 4 MG: 2 INJECTION, SOLUTION INTRAMUSCULAR; INTRAVENOUS at 12:54

## 2020-04-08 RX ADMIN — MAGNESIUM SULFATE IN WATER 2 G: 40 INJECTION, SOLUTION INTRAVENOUS at 21:52

## 2020-04-08 NOTE — ED TRIAGE NOTES
Nausea, right, front, lower abdominal pain. Nausea, and vomiting began this morning at 0600. Took OTC simethicone. Nausea ensues. COPD exacerbated by dry heaving which patient believes is causing shortness of breath.

## 2020-04-08 NOTE — DISCHARGE INSTRUCTIONS
Patient Education        Abdominal Pain: Care Instructions  Your Care Instructions    Abdominal pain has many possible causes. Some aren't serious and get better on their own in a few days. Others need more testing and treatment. If your pain continues or gets worse, you need to be rechecked and may need more tests to find out what is wrong. You may need surgery to correct the problem. Don't ignore new symptoms, such as fever, nausea and vomiting, urination problems, pain that gets worse, and dizziness. These may be signs of a more serious problem. Your doctor may have recommended a follow-up visit in the next 8 to 12 hours. If you are not getting better, you may need more tests or treatment. The doctor has checked you carefully, but problems can develop later. If you notice any problems or new symptoms, get medical treatment right away. Follow-up care is a key part of your treatment and safety. Be sure to make and go to all appointments, and call your doctor if you are having problems. It's also a good idea to know your test results and keep a list of the medicines you take. How can you care for yourself at home? · Rest until you feel better. · To prevent dehydration, drink plenty of fluids, enough so that your urine is light yellow or clear like water. Choose water and other caffeine-free clear liquids until you feel better. If you have kidney, heart, or liver disease and have to limit fluids, talk with your doctor before you increase the amount of fluids you drink. · If your stomach is upset, eat mild foods, such as rice, dry toast or crackers, bananas, and applesauce. Try eating several small meals instead of two or three large ones. · Wait until 48 hours after all symptoms have gone away before you have spicy foods, alcohol, and drinks that contain caffeine. · Do not eat foods that are high in fat. · Avoid anti-inflammatory medicines such as aspirin, ibuprofen (Advil, Motrin), and naproxen (Aleve). These can cause stomach upset. Talk to your doctor if you take daily aspirin for another health problem. When should you call for help? Call 911 anytime you think you may need emergency care. For example, call if:    · You passed out (lost consciousness).     · You pass maroon or very bloody stools.     · You vomit blood or what looks like coffee grounds.     · You have new, severe belly pain.    Call your doctor now or seek immediate medical care if:    · Your pain gets worse, especially if it becomes focused in one area of your belly.     · You have a new or higher fever.     · Your stools are black and look like tar, or they have streaks of blood.     · You have unexpected vaginal bleeding.     · You have symptoms of a urinary tract infection. These may include:  ? Pain when you urinate. ? Urinating more often than usual.  ? Blood in your urine.     · You are dizzy or lightheaded, or you feel like you may faint.    Watch closely for changes in your health, and be sure to contact your doctor if:    · You are not getting better after 1 day (24 hours). Where can you learn more? Go to http://etelvina-olivier.info/  Enter G202 in the search box to learn more about \"Abdominal Pain: Care Instructions. \"  Current as of: June 26, 2019Content Version: 12.4  © 3384-0925 [employer]. Care instructions adapted under license by [employer] (which disclaims liability or warranty for this information). If you have questions about a medical condition or this instruction, always ask your healthcare professional. [place] [address] any warranty or liability for your use of this information.

## 2020-04-08 NOTE — ED PROVIDER NOTES
EMERGENCY DEPARTMENT HISTORY AND PHYSICAL EXAM    2:31 PM late entry      Date: 4/8/2020  Patient Name: Malena Jamison    History of Presenting Illness     Chief Complaint   Patient presents with    Shortness of Breath    Abdominal Pain         History Provided By: patient    Additional History (Context): Malena Jamison is a 60 y.o. female presents with oxygen dependent COPD, comes in with right lower quadrant abdominal pain severe and constant started this morning. No fever. She has vomited from the pain. She has taken simethicone and had a bowel movement. Not passing very much gas. She does have an umbilical hernia which is in its usual state. Crystal Sánchez PCP: Oliverio Marks MD    Chief Complaint:   Duration:    Timing:    Location:   Quality:   Severity:   Modifying Factors:   Associated Symptoms:       Current Outpatient Medications   Medication Sig Dispense Refill    azithromycin (ZITHROMAX) 250 mg tablet Take 1 Tab by mouth daily. Monday-Friday. 30 Tab 0    insulin glargine (LANTUS,BASAGLAR) 100 unit/mL (3 mL) inpn 40 Units by SubCUTAneous route nightly. Please inject 40 units every bedtime. Decrease to 35 units every bedtime after 7 days of 40 units. Indications: type 2 diabetes mellitus 28 Units 0    predniSONE (DELTASONE) 20 mg tablet Take 60mg(3 tabs) daily for 3 days, then 40mg(2 tabs) daily for 3 days, then 20mg(1 tab) daily for 3 days, 10mg (1/2 tab) daily for 3 days. Then resume 10mg tab every other day. 20 Tab 0    [START ON 4/11/2020] predniSONE (DELTASONE) 10 mg tablet Take 10 mg by mouth every other day. 30 Tab 3    lactobacillus sp. 50 billion cpu (BIO-K PLUS) 50 billion cell -375 mg cap capsule Take 1 Cap by mouth daily. 30 Cap 0    PROLENSA 0.07 % ophthalmic solution Administer 1 Drop to right eye daily.  simethicone (GAS-X) 125 mg capsule Take 125 mg by mouth four (4) times daily as needed for Flatulence.       loratadine (CLARITIN) 10 mg tablet Take 10 mg by mouth daily as needed for Allergies.  lisinopril (PRINIVIL, ZESTRIL) 20 mg tablet Take 20 mg by mouth two (2) times a day.  albuterol sulfate (PROVENTIL;VENTOLIN) 2.5 mg/0.5 mL nebu nebulizer solution 0.5 mL by Nebulization route four (4) times daily as needed for Wheezing. To be used with HyperSal nebulizer solution. ICD code: COPD J44.1 60 mL 3    fluticasone propionate (FLONASE ALLERGY RELIEF) 50 mcg/actuation nasal spray 2 Sprays by Both Nostrils route daily.  fluticasone propion-salmeterol (ADVAIR HFA) 230-21 mcg/actuation inhaler Take 2 Puffs by inhalation two (2) times a day.  hydroCHLOROthiazide (HYDRODIURIL) 12.5 mg tablet Take 12.5 mg by mouth daily.  tiotropium bromide (SPIRIVA RESPIMAT) 2.5 mcg/actuation inhaler Take 2 Puffs by inhalation daily.  albuterol sulfate 90 mcg/actuation aepb Take 1 Puff by inhalation every four (4) hours. (Patient taking differently: Take 1 Puff by inhalation every four (4) hours as needed.) 1 Inhaler 0    NOVOLOG FLEXPEN U-100 INSULIN 100 unit/mL inpn Continue home Sliding scale insulin as prior to admission (Patient taking differently: 1 Units by SubCUTAneous route three (3) times daily. If BG <100=0u  101-150=5u  151-250=8u  251-300=12u  >300 call MD  ) 1 Pen 0    omeprazole (PRILOSEC) 40 mg capsule Take 40 mg by mouth daily. Indications: gastroesophageal reflux disease      OXYGEN-AIR DELIVERY SYSTEMS 2 L by Nasal route continuous. [facility]      aspirin delayed-release 81 mg tablet Take 81 mg by mouth daily.  montelukast (SINGULAIR) 10 mg tablet Take 10 mg by mouth nightly.          Past History     Past Medical History:  Past Medical History:   Diagnosis Date    Asthma     Chronic lung disease     COPD     Cystocele, midline     Diabetes mellitus ([address]     GERD (gastroesophageal reflux disease)     Hidradenitis suppurativa     Hyperlipidemia     Hypertension     SHERRIE on CPAP     CPAP    Stress incontinence Past Surgical History:  Past Surgical History:   Procedure Laterality Date    BREAST SURGERY PROCEDURE UNLISTED      Right breast benign tumor removal    HX APPENDECTOMY      HX CHOLECYSTECTOMY      HX DILATION AND CURETTAGE      Dysfunctional uterine bleeding, thought 2/2 fibroids    HX TUBAL LIGATION         Family History:  Family History   Problem Relation Age of Onset    Hypertension Mother     Stroke Mother     Breast Cancer Mother         Bilateral mastectomies    Cancer Mother         ovarian and breast    Heart Failure Mother     Heart Attack Father         2011    Heart Surgery Father         CABG    Heart Failure Father     COPD Sister         Heavy smoker    Cancer Sister         ovarian    Heart Failure Sister     Lung Disease Sister     Asthma Child     Cancer Maternal Aunt         pancreatic    Cancer Maternal Grandfather         stomach       Social History:  Social History     Tobacco Use    Smoking status: Former Smoker     Packs/day: 1.00     Years: 30.00     Pack years: 30.00     Types: Cigarettes     Start date: 1966     Last attempt to quit: 2006     Years since quittin.5    Smokeless tobacco: Never Used    Tobacco comment: 1-1.5 packs per day   Substance Use Topics    Alcohol use: No    Drug use: No       Allergies: Allergies   Allergen Reactions    Ancef [Cefazolin] Hives    Contrast Agent [Iodine] Anaphylaxis, Shortness of Breath and Swelling     Needs pre-medication for IV contrast with Benadryl, Solu-Medrol    Fish Containing Products Anaphylaxis     Pt states she had a severe allergic reaction at 7 y/o.  Statins-Hmg-Coa Reductase Inhibitors Myalgia    Metformin Other (comments)     Abdominal pain, diarrhea.  Codeine Other (comments)     Altered mental status         Review of Systems     Review of Systems   Constitutional: Negative for diaphoresis and fever. HENT: Negative for congestion and sore throat.     Eyes: Negative for pain and itching. Respiratory: Negative for cough and shortness of breath. Cardiovascular: Negative for chest pain and palpitations. Gastrointestinal: Positive for abdominal pain. Negative for diarrhea. Endocrine: Negative for polydipsia and polyuria. Genitourinary: Negative for dysuria and hematuria. Musculoskeletal: Negative for arthralgias and myalgias. Skin: Negative for rash and wound. Neurological: Negative for seizures and syncope. Hematological: Does not bruise/bleed easily. Psychiatric/Behavioral: Negative for agitation and hallucinations. Physical Exam       Patient Vitals for the past 12 hrs:   Temp Pulse Resp BP SpO2   04/08/20 1400 -- (!) 56 21 150/72 99 %   04/08/20 1345 -- (!) 58 24 162/75 99 %   04/08/20 1300 -- 65 22 142/59 100 %   04/08/20 1203 97 °F (36.1 °C) 68 22 179/81 --       Physical Exam  Vitals signs and nursing note reviewed. Constitutional:       General: She is in acute distress. Appearance: She is well-developed. She is obese. She is not toxic-appearing. HENT:      Head: Normocephalic and atraumatic. Eyes:      General: No scleral icterus. Conjunctiva/sclera: Conjunctivae normal.   Neck:      Musculoskeletal: Normal range of motion and neck supple. Vascular: No JVD. Cardiovascular:      Rate and Rhythm: Normal rate and regular rhythm. Heart sounds: Normal heart sounds. Comments: 4 intact extremity pulses  Pulmonary:      Effort: Pulmonary effort is normal.      Breath sounds: Wheezing present. No decreased breath sounds. Comments: On nasal cannula  Abdominal:      Palpations: Abdomen is soft. There is no mass. Tenderness: There is no abdominal tenderness. Musculoskeletal: Normal range of motion. Lymphadenopathy:      Cervical: No cervical adenopathy. Skin:     General: Skin is warm and dry. Neurological:      Mental Status: She is alert.            Diagnostic Study Results   Labs -  Recent Results (from the past 12 hour(s))   CBC WITH AUTOMATED DIFF    Collection Time: 04/08/20 12:49 PM   Result Value Ref Range    WBC 12.1 4.6 - 13.2 K/uL    RBC 4.54 4.20 - 5.30 M/uL    HGB 13.8 12.0 - 16.0 g/dL    HCT 40.6 35.0 - 45.0 %    MCV 89.4 74.0 - 97.0 FL    MCH 30.4 24.0 - 34.0 PG    MCHC 34.0 31.0 - 37.0 g/dL    RDW 14.0 11.6 - 14.5 %    PLATELET 331 425 - 920 K/uL    MPV 9.3 9.2 - 11.8 FL    NEUTROPHILS 69 40 - 73 %    LYMPHOCYTES 23 21 - 52 %    MONOCYTES 8 3 - 10 %    EOSINOPHILS 0 0 - 5 %    BASOPHILS 0 0 - 2 %    ABS. NEUTROPHILS 8.3 (H) 1.8 - 8.0 K/UL    ABS. LYMPHOCYTES 2.8 0.9 - 3.6 K/UL    ABS. MONOCYTES 1.0 0.05 - 1.2 K/UL    ABS. EOSINOPHILS 0.1 0.0 - 0.4 K/UL    ABS. BASOPHILS 0.0 0.0 - 0.1 K/UL    DF AUTOMATED     METABOLIC PANEL, COMPREHENSIVE    Collection Time: 04/08/20 12:49 PM   Result Value Ref Range    Sodium 136 136 - 145 mmol/L    Potassium 4.2 3.5 - 5.5 mmol/L    Chloride 98 (L) 100 - 111 mmol/L    CO2 30 21 - 32 mmol/L    Anion gap 8 3.0 - 18 mmol/L    Glucose 272 (H) 74 - 99 mg/dL    BUN 22 (H) 7.0 - 18 MG/DL    Creatinine 1.03 0.6 - 1.3 MG/DL    BUN/Creatinine ratio 21 (H) 12 - 20      GFR est AA >60 >60 ml/min/1.73m2    GFR est non-AA 55 (L) >60 ml/min/1.73m2    Calcium 9.3 8.5 - 10.1 MG/DL    Bilirubin, total 0.6 0.2 - 1.0 MG/DL    ALT (SGPT) 35 13 - 56 U/L    AST (SGOT) 14 10 - 38 U/L    Alk. phosphatase 70 45 - 117 U/L    Protein, total 7.5 6.4 - 8.2 g/dL    Albumin 3.7 3.4 - 5.0 g/dL    Globulin 3.8 2.0 - 4.0 g/dL    A-G Ratio 1.0 0.8 - 1.7         Radiologic Studies -   CT ABD PELV WO CONT   Final Result   IMPRESSION:      Unchanged small bowel containing periumbilical hernia without associated   obstruction. Hepatomegaly and mild steatosis. Mild diverticulosis. Moderate atherosclerosis with multifocal visceral ostia stenosis. Severe left coronary arteriosclerosis. Emphysema.         Ct Abd Pelv Wo Cont    Result Date: 4/8/2020  CT ABDOMEN AND PELVIS WITHOUT ENHANCEMENT INDICATION: Nausea, right lower quadrant pain, vomiting since this morning. TECHNIQUE: Axial images obtained of the abdomen and pelvis without intravenous contrast. Coronal and sagittal reformatted images of the abdomen and pelvis were obtained. All CT scans at this facility are performed using dose optimization technique as appropriate to a performed exam, to include automated exposure control, adjustment of the mA and/or kV according to patient size (including appropriate matching first site-specific examinations), or use of iterative reconstruction technique. COMPARISON: 7/14/2019. Lack of intravenous contrast renders this study suboptimal for evaluating the solid abdominal organs, vasculature and bowel. ABDOMEN FINDINGS: Liver: Liver is 22.5 cm craniocaudal with mild decreased density. Spleen: Unremarkable. Pancreas: Unremarkable. Biliary: Cholecystectomy. Bowel: Mild diverticulosis. Similar small bowel in a periumbilical hernia. Peritoneum/ Retroperitoneum: Unremarkable. Lymph Nodes: Unremarkable. Adrenal Glands: Unremarkable. Kidneys: Unremarkable. Vessels: Moderate atherosclerosis. Multifocal stenosis of the visceral ostia. PELVIS FINDINGS: Bladder/ Pelvic Organs: Unremarkable. Lung Base: Severe coronary arteriosclerosis left coronary artery and LAD. Centrilobular emphysema. Subsegmental atelectasis at the lung bases. Bones/Soft tissues: Degenerative changes bilateral hips. Degenerative disc disease. IMPRESSION: Unchanged small bowel containing periumbilical hernia without associated obstruction. Hepatomegaly and mild steatosis. Mild diverticulosis. Moderate atherosclerosis with multifocal visceral ostia stenosis. Severe left coronary arteriosclerosis. Emphysema.       Medications ordered:   Medications   morphine injection 4 mg (4 mg IntraVENous Given 4/8/20 1254)         Medical Decision Making   Initial Medical Decision Making and DDx:  No bowel obstruction kidney stone appendicitis or other cause for the patient's right lower quadrant pain. Her hernias uncomplicated at this point. Reassessed the patient her pain is improved, she will use over-the-counter medications, avoid narcotics as it will put her at risk for constipation worsening the pain. She is happy with this plan ready for discharge. Her breathing is at its baseline. ED Course: Progress Notes, Reevaluation, and Consults:         I am the first provider for this patient. I reviewed the vital signs, available nursing notes, past medical history, past surgical history, family history and social history. Patient Vitals for the past 12 hrs:   Temp Pulse Resp BP SpO2   04/08/20 1400 -- (!) 56 21 150/72 99 %   04/08/20 1345 -- (!) 58 24 162/75 99 %   04/08/20 1300 -- 65 22 142/59 100 %   04/08/20 1203 97 °F (36.1 °C) 68 22 179/81 --       Vital Signs-Reviewed the patient's vital signs. Pulse Oximetry Analysis, Cardiac Monitor, 12 lead ekg:      Interpreted by the EP. Records Reviewed: Nursing notes reviewed (Time of Review: 2:31 PM)    Procedures:   Critical Care Time:   Aspirin: (was aspirin given for stroke?)    Diagnosis     Clinical Impression:   1. Abdominal pain, right lower quadrant        Disposition: Discharged      Follow-up Information     Follow up With Specialties Details Why Contact Info    Lanette Tomas MD [facility] In 2 days  [address] [facility]  [phone]             Patient's Medications   Start Taking    No medications on file   Continue Taking    ALBUTEROL SULFATE (PROVENTIL;VENTOLIN) 2.5 MG/0.5 ML NEBU NEBULIZER SOLUTION    0.5 mL by Nebulization route four (4) times daily as needed for Wheezing. To be used with HyperSal nebulizer solution. ICD code: COPD J44.1    ALBUTEROL SULFATE 90 MCG/ACTUATION AEPB    Take 1 Puff by inhalation every four (4) hours. ASPIRIN DELAYED-RELEASE 81 MG TABLET    Take 81 mg by mouth daily. AZITHROMYCIN (ZITHROMAX) 250 MG TABLET    Take 1 Tab by mouth daily. Monday-Friday. FLUTICASONE PROPION-SALMETEROL (ADVAIR HFA) 230-21 MCG/ACTUATION INHALER    Take 2 Puffs by inhalation two (2) times a day. FLUTICASONE PROPIONATE (FLONASE ALLERGY RELIEF) 50 MCG/ACTUATION NASAL SPRAY    2 Sprays by Both Nostrils route daily. HYDROCHLOROTHIAZIDE (HYDRODIURIL) 12.5 MG TABLET    Take 12.5 mg by mouth daily. INSULIN GLARGINE (LANTUS,BASAGLAR) 100 UNIT/ML (3 ML) INPN    40 Units by SubCUTAneous route nightly. Please inject 40 units every bedtime. Decrease to 35 units every bedtime after 7 days of 40 units. Indications: type 2 diabetes mellitus    LACTOBACILLUS SP. 50 BILLION CPU (BIO-K PLUS) 50 BILLION CELL -375 MG CAP CAPSULE    Take 1 Cap by mouth daily. LISINOPRIL (PRINIVIL, ZESTRIL) 20 MG TABLET    Take 20 mg by mouth two (2) times a day. LORATADINE (CLARITIN) 10 MG TABLET    Take 10 mg by mouth daily as needed for Allergies. MONTELUKAST (SINGULAIR) 10 MG TABLET    Take 10 mg by mouth nightly. NOVOLOG FLEXPEN U-100 INSULIN 100 UNIT/ML INPN    Continue home Sliding scale insulin as prior to admission    OMEPRAZOLE (PRILOSEC) 40 MG CAPSULE    Take 40 mg by mouth daily. Indications: gastroesophageal reflux disease    OXYGEN-AIR DELIVERY SYSTEMS    2 L by Nasal route continuous. [employer]    PREDNISONE (DELTASONE) 10 MG TABLET    Take 10 mg by mouth every other day. PREDNISONE (DELTASONE) 20 MG TABLET    Take 60mg(3 tabs) daily for 3 days, then 40mg(2 tabs) daily for 3 days, then 20mg(1 tab) daily for 3 days, 10mg (1/2 tab) daily for 3 days. Then resume 10mg tab every other day. PROLENSA 0.07 % OPHTHALMIC SOLUTION    Administer 1 Drop to right eye daily. SIMETHICONE (GAS-X) 125 MG CAPSULE    Take 125 mg by mouth four (4) times daily as needed for Flatulence. TIOTROPIUM BROMIDE (SPIRIVA RESPIMAT) 2.5 MCG/ACTUATION INHALER    Take 2 Puffs by inhalation daily.    These Medications have changed    No medications on file   Stop Taking    No medications on file     _______________________________    Notes:    [place], MD using Dragon dictation      _______________________________

## 2020-04-09 ENCOUNTER — APPOINTMENT (OUTPATIENT)
Dept: GENERAL RADIOLOGY | Age: 60
DRG: 191 | End: 2020-04-09
Attending: STUDENT IN AN ORGANIZED HEALTH CARE EDUCATION/TRAINING PROGRAM
Payer: MEDICARE

## 2020-04-09 LAB
ANION GAP SERPL CALC-SCNC: 9 MMOL/L (ref 3–18)
ATRIAL RATE: 84 BPM
BASOPHILS # BLD: 0 K/UL (ref 0–0.1)
BASOPHILS NFR BLD: 0 % (ref 0–2)
BUN SERPL-MCNC: 21 MG/DL (ref 7–18)
BUN/CREAT SERPL: 18 (ref 12–20)
CALCIUM SERPL-MCNC: 9.2 MG/DL (ref 8.5–10.1)
CALCULATED P AXIS, ECG09: 47 DEGREES
CALCULATED R AXIS, ECG10: 14 DEGREES
CALCULATED T AXIS, ECG11: 52 DEGREES
CHLORIDE SERPL-SCNC: 96 MMOL/L (ref 100–111)
CO2 SERPL-SCNC: 26 MMOL/L (ref 21–32)
CREAT SERPL-MCNC: 1.14 MG/DL (ref 0.6–1.3)
DIAGNOSIS, 93000: NORMAL
DIFFERENTIAL METHOD BLD: ABNORMAL
EOSINOPHIL # BLD: 0 K/UL (ref 0–0.4)
EOSINOPHIL NFR BLD: 0 % (ref 0–5)
ERYTHROCYTE [DISTWIDTH] IN BLOOD BY AUTOMATED COUNT: 14.2 % (ref 11.6–14.5)
GLUCOSE BLD STRIP.AUTO-MCNC: 209 MG/DL (ref 70–110)
GLUCOSE BLD STRIP.AUTO-MCNC: 218 MG/DL (ref 70–110)
GLUCOSE BLD STRIP.AUTO-MCNC: 285 MG/DL (ref 70–110)
GLUCOSE BLD STRIP.AUTO-MCNC: 299 MG/DL (ref 70–110)
GLUCOSE BLD STRIP.AUTO-MCNC: 329 MG/DL (ref 70–110)
GLUCOSE SERPL-MCNC: 310 MG/DL (ref 74–99)
HCT VFR BLD AUTO: 42.6 % (ref 35–45)
HGB BLD-MCNC: 14.1 G/DL (ref 12–16)
LYMPHOCYTES # BLD: 1.1 K/UL (ref 0.9–3.6)
LYMPHOCYTES NFR BLD: 8 % (ref 21–52)
MCH RBC QN AUTO: 30.3 PG (ref 24–34)
MCHC RBC AUTO-ENTMCNC: 33.1 G/DL (ref 31–37)
MCV RBC AUTO: 91.4 FL (ref 74–97)
MONOCYTES # BLD: 0.2 K/UL (ref 0.05–1.2)
MONOCYTES NFR BLD: 1 % (ref 3–10)
NEUTS SEG # BLD: 12.5 K/UL (ref 1.8–8)
NEUTS SEG NFR BLD: 91 % (ref 40–73)
P-R INTERVAL, ECG05: 148 MS
PLATELET # BLD AUTO: 309 K/UL (ref 135–420)
PMV BLD AUTO: 9.6 FL (ref 9.2–11.8)
POTASSIUM SERPL-SCNC: 4.7 MMOL/L (ref 3.5–5.5)
Q-T INTERVAL, ECG07: 382 MS
QRS DURATION, ECG06: 88 MS
QTC CALCULATION (BEZET), ECG08: 451 MS
RBC # BLD AUTO: 4.66 M/UL (ref 4.2–5.3)
SODIUM SERPL-SCNC: 131 MMOL/L (ref 136–145)
VENTRICULAR RATE, ECG03: 84 BPM
WBC # BLD AUTO: 13.8 K/UL (ref 4.6–13.2)

## 2020-04-09 PROCEDURE — 85025 COMPLETE CBC W/AUTO DIFF WBC: CPT

## 2020-04-09 PROCEDURE — 97161 PT EVAL LOW COMPLEX 20 MIN: CPT

## 2020-04-09 PROCEDURE — 74019 RADEX ABDOMEN 2 VIEWS: CPT

## 2020-04-09 PROCEDURE — 74011636637 HC RX REV CODE- 636/637: Performed by: STUDENT IN AN ORGANIZED HEALTH CARE EDUCATION/TRAINING PROGRAM

## 2020-04-09 PROCEDURE — 74011000250 HC RX REV CODE- 250: Performed by: STUDENT IN AN ORGANIZED HEALTH CARE EDUCATION/TRAINING PROGRAM

## 2020-04-09 PROCEDURE — 80048 BASIC METABOLIC PNL TOTAL CA: CPT

## 2020-04-09 PROCEDURE — 74011636637 HC RX REV CODE- 636/637: Performed by: FAMILY MEDICINE

## 2020-04-09 PROCEDURE — 97165 OT EVAL LOW COMPLEX 30 MIN: CPT

## 2020-04-09 PROCEDURE — 93005 ELECTROCARDIOGRAM TRACING: CPT

## 2020-04-09 PROCEDURE — 36415 COLL VENOUS BLD VENIPUNCTURE: CPT

## 2020-04-09 PROCEDURE — 74011250637 HC RX REV CODE- 250/637: Performed by: STUDENT IN AN ORGANIZED HEALTH CARE EDUCATION/TRAINING PROGRAM

## 2020-04-09 PROCEDURE — 5A09357 ASSISTANCE WITH RESPIRATORY VENTILATION, LESS THAN 24 CONSECUTIVE HOURS, CONTINUOUS POSITIVE AIRWAY PRESSURE: ICD-10-PCS | Performed by: FAMILY MEDICINE

## 2020-04-09 PROCEDURE — 94761 N-INVAS EAR/PLS OXIMETRY MLT: CPT

## 2020-04-09 PROCEDURE — 97530 THERAPEUTIC ACTIVITIES: CPT

## 2020-04-09 PROCEDURE — 71046 X-RAY EXAM CHEST 2 VIEWS: CPT

## 2020-04-09 PROCEDURE — 74011250636 HC RX REV CODE- 250/636: Performed by: STUDENT IN AN ORGANIZED HEALTH CARE EDUCATION/TRAINING PROGRAM

## 2020-04-09 PROCEDURE — 65270000029 HC RM PRIVATE

## 2020-04-09 PROCEDURE — 94640 AIRWAY INHALATION TREATMENT: CPT

## 2020-04-09 PROCEDURE — 97535 SELF CARE MNGMENT TRAINING: CPT

## 2020-04-09 PROCEDURE — 82962 GLUCOSE BLOOD TEST: CPT

## 2020-04-09 RX ORDER — IPRATROPIUM BROMIDE AND ALBUTEROL SULFATE 2.5; .5 MG/3ML; MG/3ML
3 SOLUTION RESPIRATORY (INHALATION)
Status: DISCONTINUED | OUTPATIENT
Start: 2020-04-09 | End: 2020-04-10

## 2020-04-09 RX ORDER — ACETAMINOPHEN 325 MG/1
325 TABLET ORAL
Status: DISCONTINUED | OUTPATIENT
Start: 2020-04-09 | End: 2020-04-10

## 2020-04-09 RX ORDER — AZITHROMYCIN 250 MG/1
250 TABLET, FILM COATED ORAL
Status: DISCONTINUED | OUTPATIENT
Start: 2020-04-09 | End: 2020-04-12 | Stop reason: HOSPADM

## 2020-04-09 RX ORDER — AZITHROMYCIN 250 MG/1
250 TABLET, FILM COATED ORAL SEE ADMIN INSTRUCTIONS
Status: DISCONTINUED | OUTPATIENT
Start: 2020-04-09 | End: 2020-04-09

## 2020-04-09 RX ORDER — ONDANSETRON 4 MG/1
4 TABLET, FILM COATED ORAL
Status: DISCONTINUED | OUTPATIENT
Start: 2020-04-09 | End: 2020-04-12 | Stop reason: HOSPADM

## 2020-04-09 RX ADMIN — ASPIRIN 81 MG: 81 TABLET, COATED ORAL at 09:10

## 2020-04-09 RX ADMIN — LISINOPRIL 20 MG: 20 TABLET ORAL at 09:11

## 2020-04-09 RX ADMIN — ARFORMOTEROL TARTRATE 15 MCG: 15 SOLUTION RESPIRATORY (INHALATION) at 00:14

## 2020-04-09 RX ADMIN — INSULIN LISPRO 9 UNITS: 100 INJECTION, SOLUTION INTRAVENOUS; SUBCUTANEOUS at 12:07

## 2020-04-09 RX ADMIN — IPRATROPIUM BROMIDE AND ALBUTEROL SULFATE 3 ML: .5; 3 SOLUTION RESPIRATORY (INHALATION) at 11:22

## 2020-04-09 RX ADMIN — AZITHROMYCIN MONOHYDRATE 250 MG: 250 TABLET ORAL at 02:53

## 2020-04-09 RX ADMIN — METHYLPREDNISOLONE SODIUM SUCCINATE 40 MG: 40 INJECTION, POWDER, FOR SOLUTION INTRAMUSCULAR; INTRAVENOUS at 09:11

## 2020-04-09 RX ADMIN — INSULIN LISPRO 6 UNITS: 100 INJECTION, SOLUTION INTRAVENOUS; SUBCUTANEOUS at 09:11

## 2020-04-09 RX ADMIN — INSULIN GLARGINE 35 UNITS: 100 INJECTION, SOLUTION SUBCUTANEOUS at 00:03

## 2020-04-09 RX ADMIN — IPRATROPIUM BROMIDE AND ALBUTEROL SULFATE 3 ML: .5; 3 SOLUTION RESPIRATORY (INHALATION) at 07:39

## 2020-04-09 RX ADMIN — BUDESONIDE 250 MCG: 0.25 SUSPENSION RESPIRATORY (INHALATION) at 00:14

## 2020-04-09 RX ADMIN — FLUTICASONE PROPIONATE 2 SPRAY: 50 SPRAY, METERED NASAL at 12:07

## 2020-04-09 RX ADMIN — INSULIN LISPRO 6 UNITS: 100 INJECTION, SOLUTION INTRAVENOUS; SUBCUTANEOUS at 22:02

## 2020-04-09 RX ADMIN — BUDESONIDE 250 MCG: 0.25 SUSPENSION RESPIRATORY (INHALATION) at 07:39

## 2020-04-09 RX ADMIN — IPRATROPIUM BROMIDE AND ALBUTEROL SULFATE 3 ML: .5; 3 SOLUTION RESPIRATORY (INHALATION) at 15:54

## 2020-04-09 RX ADMIN — INSULIN LISPRO 4 UNITS: 100 INJECTION, SOLUTION INTRAVENOUS; SUBCUTANEOUS at 00:26

## 2020-04-09 RX ADMIN — HYDROCHLOROTHIAZIDE 12.5 MG: 25 TABLET ORAL at 09:11

## 2020-04-09 RX ADMIN — PANTOPRAZOLE SODIUM 40 MG: 40 TABLET, DELAYED RELEASE ORAL at 09:11

## 2020-04-09 RX ADMIN — HEPARIN SODIUM 5000 UNITS: 5000 INJECTION INTRAVENOUS; SUBCUTANEOUS at 00:03

## 2020-04-09 RX ADMIN — IPRATROPIUM BROMIDE AND ALBUTEROL SULFATE 3 ML: .5; 3 SOLUTION RESPIRATORY (INHALATION) at 03:08

## 2020-04-09 RX ADMIN — LISINOPRIL 20 MG: 20 TABLET ORAL at 00:03

## 2020-04-09 RX ADMIN — HEPARIN SODIUM 5000 UNITS: 5000 INJECTION INTRAVENOUS; SUBCUTANEOUS at 09:11

## 2020-04-09 RX ADMIN — HEPARIN SODIUM 5000 UNITS: 5000 INJECTION INTRAVENOUS; SUBCUTANEOUS at 16:24

## 2020-04-09 RX ADMIN — INSULIN LISPRO 12 UNITS: 100 INJECTION, SOLUTION INTRAVENOUS; SUBCUTANEOUS at 16:24

## 2020-04-09 RX ADMIN — ARFORMOTEROL TARTRATE 15 MCG: 15 SOLUTION RESPIRATORY (INHALATION) at 07:39

## 2020-04-09 RX ADMIN — INSULIN GLARGINE 35 UNITS: 100 INJECTION, SOLUTION SUBCUTANEOUS at 22:02

## 2020-04-09 NOTE — PROGRESS NOTES
Patient is Aox4 with complaints of abdominal pain. Patient refused any medication management. AM assessment completed. Bed in lowest locked position, call light within reach, side rails x2.     Problem: Diabetes Maintenance:Admission  Goal: *Blood glucose 80 to 180 mg/dl  Outcome: Progressing Towards Goal     Problem: Patient Education: Go to Patient Education Activity  Goal: Patient/Family Education  Outcome: Progressing Towards Goal

## 2020-04-09 NOTE — DIABETES MGMT
GLYCEMIC CONTROL PLAN OF CARE     Assessment/Recommendations:  Fasting lab glucose this am 310 mg/dl  Noted pt receiving steroids. Methylprednisolone 125 mg given last night and currently ordered to be given 40 mg daily IV. Home dose of lantus ordered, along with lispro corrective insulin coverage AC&HS. Advanced to very insulin resistant dosing. Will continue inpatient monitoring and assess need for insulin adjustment. Most recent blood glucose values:  Results for [place] Maria (MRN [id number]) as of 4/9/2020 10:35   Ref. Range 4/9/2020 00:00 4/9/2020 07:43   GLUCOSE,FAST - POC Latest Ref Range: 70 - 110 mg/dL 218 (H) 299 (H)     Current A1C of 8.2 % is equivalent to average blood glucose of 189 mg/dl over the past 2-3 months. Current hospital diabetes medications:   lantus 35 units every bedtime  Lispro corrective insulin coverage AC&HS  Home diabetes medications:  Basaglar 35 units daily  Novolog tid with meals based on BG readings  Diet:    Diabetic consistent carb.  No concentrated sweets  Education:  _x__Refer to Diabetes Education Record             ____Education not indicated at this time      [facility], [address] [place] [address] CDE  Ext [phone]

## 2020-04-09 NOTE — PROGRESS NOTES
[facility] Readmission Patient Interview    The following is related to the patient's last admission and the events following discharge from the hospital:      Date of last hospital discharge:  03/30/2020     Date of Readmission:  04/08/2020    Reason for Readmission:  COPD Exacerbation    Were you kept informed about your diagnoses during your stay in the hospital and what was being done to further evaluate and treat them? * in reference to index admission*      Interviewer Notes:       [] None of time   [] Some of time   [x] Most of time   [] All of time   At the time of your discharge, did someone talk to you about:  *if patient answers yes - ask them to recall what information they remember*     1. What your diagnoses were? 2. What tests or procedures needed to be done after you left? 3. What to watch out for regarding worsening of your disease? 4. What to do if you were experiencing worsening of your disease? 5. Who to contact (and how) if you were experiencing worsening of your disease? Interviewer Notes:           [x] Yes  [] No  [] Not Sure   [x] Yes  [] No  [] Not Sure   [x] Yes  [] No  [] Not Sure   [x] Yes  [] No  [] Not Sure   [x] Yes  [] No  [] Not Sure   Were you asked about your understanding of these instructions? Interviewer Notes:    [x] Yes  [] No  [] Not Sure   Were the discharge instructions written down and given to you before you left? Interviewer Notes:    [x] Yes  [] No  [] Not Sure   Were the written discharge instructions and plans easy to read and understand? Interviewer Notes:    [x] Yes  [] No  [] Not Sure   How confident were you about understanding these instructions? Interviewer Notes:  [] Very confident   [x] Somewhat confident   [] Not confident   [] Not Sure   Do you have a regular doctor who takes care of you for most things?    Primary Providers Name/Practice Name:  Dr. Patrick Amin    [x] Yes  [] No  [] Not Sure   At the time of your discharge, did someone talk to you about which medications to take when you left and which ones to discontinue? Interviewer Notes:    [x] Yes  [] No  [] Not Sure   Did you take your medications as they were prescribed? Interviewer Notes:    [x] Yes  [] No  [] Not Sure   If not, what were the difficulties you experienced with taking your medications? Comments:          After you left the hospital, did you have an appointment with your doctor? [x] Yes  [] No  [] Not Sure     If yes, who made the appointment? [x] I did    [] My family   [] Hospital staff   [] Not Sure   Were you able to get to this appointment?      Interviewer Notes:    [] Yes  [x] No  [] Not Sure   How do you think you became sick enough to be readmitted to the hospital?   Comment:       Shortness of breath, nausea, and vomiting   Source:  Modified from [facility], [facility] [employer] [place], [place]

## 2020-04-09 NOTE — DIABETES MGMT
Diabetes Patient/Family Education Record  Factors That  May Influence Patients Ability  to Learn or  Comply with Recommendations   []   Language barrier    []   Cultural needs   []   Motivation    []   Cognitive limitation    []   Physical   []   Education    []   Physiological factors   []   Hearing/vision/speaking impairment   []   Congregation beliefs    []   Financial factors   []  Other:   [x]  No factors identified at this time. Person Instructed:   [x]   Patient   []   Family   []  Other     Preference for Learning:   [x]   Verbal   []   Written   []  Demonstration     Level of Comprehension & Competence:    [x]  Good                                      [] Fair                                     []  Poor                             []  Needs Reinforcement   [x]  Teachback completed    Education Component:   [x]  Medication management, including how to administer insulin (if appropriate) and potential medication interactions   Home diabetes medications include:    Basaglar 35 units daily and Novolog based on a corrective scale. [x]  Nutritional management -obtain usual meal pattern  · \"I have been trying really hard to eat better at home. \"   []  Exercise   [x]  Signs, symptoms, and treatment of hyperglycemia and hypoglycemia   [x] Prevention, recognition and treatment of hyperglycemia and hypoglycemia   [x]  Importance of blood glucose monitoring and how to obtain a blood glucose meter   · Has a glucometer and supplies at home.   []  Instruction on use of the blood glucose meter   [x]  Discuss the importance of HbA1C monitoring   · 8.2% equivalent to an average blood glucose of 189 mg/dl over the past 2-3 months   · \"That isn't too bad, considering. \"  · Reviewed target blood sugars with pt.    [x]  Sick day guidelines   [x]  Proper use and disposal of lancets, needles, syringes or insulin pens (if appropriate)   [x]  Potential long-term complications (retinopathy, kidney disease, neuropathy, foot care)   [x] Information about whom to contact in case of emergency or for more information    [x]  Goal:  Patient/family will demonstrate understanding of Diabetes Self Management Skills by: (date) _4/14/2020____  Plan for post-discharge education or self-management support:    [x] Outpatient class schedule provided            [] Patient Declined    [] Scheduled for outpatient classes (date) _______  Verify:  Does patient understand how diabetes medications work? Yes  Does patient know what their most recent A1c is? Yes  Does patient monitor glucose at home? Yes  Does patient have difficulty obtaining diabetes medications or testing supplies?  No issues voiced

## 2020-04-09 NOTE — H&P
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 0.6 0.2 - 1.0 MG/DL     ALT (SGPT) 35 13 - 56 U/L     AST (SGOT) 14 10 - 38 U/L     Alk. phosphatase 70 45 - 117 U/L     Protein, total 7.5 6.4 - 8.2 g/dL     Albumin 3.7 3.4 - 5.0 g/dL     Globulin 3.8 2.0 - 4.0 g/dL     A-G Ratio 1.0 0.8 - 1.7                 Assessment/Plan:   60 y.o. female with PMH of COPD on 2L of home O2, IDDM2, HTN, HFpEF, SHERRIE on CPAP, GERD, allergic rhinitis , now admitted with shortness of breath.     1. Acute SOB with increased WOB: Secondary to COPD Exacerbation vs acute anxiety attack due to a traumatic event. She has a very recent history of poorly controlled COPD, but did not become acutely short of breath before this evening's traumatic events. Patient is on Triligy at night time and intermittent use of 2 L NC at home.  No increased oxygen requirement or hypoxia. VBG was negative.  Patient with COPD GOLD D with asthma component, steroid dependent. Properly prescribed ICS + LABA based on GOLD criteria. Just discharged from hospital on 4/2. She is afebrile, no signs or symptoms of infection. Currently no URI symptoms except for dry chronic cough. She is still on a steroid taper from previous visit. See intern note for details. - Follow-up CXR  - Scheduled duonebs Q4H  - Solumedrol 60mg IV daily for now  - Bipap at night   - Goal SpO2 should be between 88-92% in this patient with severe COPD  - Continue home singular  - Subsitute home Advair with Pulmicort. - Daily CBC, BMP   - PT/OT/CM  - May need to consider SNF placement due to frequent hospital visits.      2. Abdominal Pain: Has had some nausea with vomiting. She did have a BM today and yesterday. Pain is worse in RLQ, but pain is more diffuse on exam. No guarding or rigidity. She does have an umbilical hernia, which is unchanged. LFTs, alk phos today were unremarkable. She has had a cholecystectomy. CT ABD/PEL today showed no obstruction associated with the umbilical hernia, she does have hepatomegaly with mild diverticulosis.    - Zofran 4mg IV prn  - Monitor        For additional problem list, assessment, and plan see intern note        Jenifer Jimenez D.O. PGY-2  [address]
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
## 2020-04-09 NOTE — PROGRESS NOTES
Reason for Readmission:    COPD exacerbation ([facility] [J44.1]         RRAT Score and Risk Level:      46%     Level of Readmission:    4   (1-3)   (1 - First Readmission, 2- Second Readmission within 30 days or multiple admissions over previous 90 days, 3- Greater than two readmissions within 30 days or multiple admissions over previous 90 days)      Care Conference scheduled:   (consider for all patient's with readmission level of 2, should definitely be scheduled for all patients with readmission level of 3)       Resources/supports as identified by patient/family:  No issues at this time. Top Challenges facing patient (as identified by patient/family and CM): Finances/Medication cost?     No issues at this time. Transportation      Family transports the patient. Support system or lack thereof? Patient has family as her support system. Living arrangements? Patient lives with her daughter in a duplex. Self-care/ADLs/Cognition? Patient needs assist with her ADL's. Current Advanced Directive/Advance Care Plan:             Plan for utilizing home health:   no.             Likelihood of additional readmission:                Transition of Care Plan:    Based on readmission, the patient's previous Plan of Care   has been evaluated and/or modified. The current Transition of Care Plan is:            Initial assessment completed with patient. Cognitive status of patient: oriented to time, place, person and situation. Face sheet information confirmed:  yes. The patient designates her daughter Patricia Layne [phone]  to participate in her discharge plan and to receive any needed information. This patient lives in a duplex home with her daughter, and has 2 steps to get into the home. Patient is able to navigate steps as needed. Prior to hospitalization, patient was considered to be independent with ADLs/IADLS : no .  If not independent,  patient needs assist with : dressing, bathing, food preparation, cooking, toileting and grooming    Patient has a current ACP document on file: no  The patient's daughter or sister  will be available to transport patient home upon discharge. The patient already has Walker, and Oxygen, Nebulizer from [employer]. Patient also has Trilogy but patient can't remember what company she got it from just knows it starts with Med. Patient has a [facility] [address] [facility] with [facility] on Monday through Friday from 8:30am -3:00pm.      Patient is not currently active with home health. Patient has not stayed in a skilled nursing facility or rehab. This patient is on dialysis :no    Currently, the discharge plan is Home. The patient states that she can obtain her medications from the pharmacy, and take her medications as directed. Patient's current insurance is [employer] Medicare and [facility] Medicaid. Care Management Interventions  PCP Verified by CM: Yes  Last Visit to PCP: 04/03/20  Mode of Transport at Discharge: Self(Patient states her daughter or her sister will pick her up at time of discharge.)  Transition of Care Consult (CM Consult): Discharge Planning  Current Support Network:  Other(Patient lives with her daughter in a Duplex.)  Confirm Follow Up Transport: Family  Discharge Location  Discharge Placement: Home with family assistance        Loreto Mehta RN  Case Management [phone]

## 2020-04-09 NOTE — ED NOTES
TRANSFER - OUT REPORT:    Verbal report given to MARIA ESTHER CLEMENTS on Kerrie Pichardo  being transferred to [address] [place] [address](unit) for routine progression of care       Report consisted of patients Situation, Background, Assessment and   Recommendations(SBAR). Information from the following report(s) SBAR, ED Summary and MAR was reviewed with the receiving nurse. Lines:   Peripheral IV 04/08/20 Superior Wrist (Active)   Site Assessment Clean, dry, & intact 4/8/2020 11:25 PM   Dressing Status Clean, dry, & intact 4/8/2020 11:25 PM        Opportunity for questions and clarification was provided.       Patient transported with:   Monitor  Registered Nurse

## 2020-04-09 NOTE — PROGRESS NOTES
Bedside shift change report given to Farshad Benoit RN (oncoming nurse) by Priscila Bobby RN (offgoing nurse). Report included the following information SBAR, Kardex and MAR.

## 2020-04-09 NOTE — PROGRESS NOTES
Problem: Mobility Impaired (Adult and Pediatric)  Goal: *Acute Goals and Plan of Care (Insert Text)  Description: Physical Therapy Goals  Initiated 4/9/2020 and to be accomplished within 7 day(s)  1. Patient will move from supine to sit and sit to supine  in bed with modified independence. 2.  Patient will transfer from bed to chair and chair to bed with modified independence using the least restrictive device. 3.  Patient will perform sit to stand with modified independence. 4.  Patient will ambulate with modified independence for 40 feet with the least restrictive device. PLOF: Pt reports she has aid 5 deleon/week for 6 hours, assists with bathing and dressing. Patient ambulates inside home holding onto furniture and has RW. Outcome: Progressing Towards Goal     PHYSICAL THERAPY EVALUATION    Patient: Hina Koch (65 y.o. female)  Date: 4/9/2020  Primary Diagnosis: COPD exacerbation ([facility] [J44.1]        Precautions:   Fall      ASSESSMENT :  Pt sitting EOB agreeable to skilled PT evaluation/treatment. Patient confirms that she uses supplemental O2 at home, 2L. Her SPO2 at rest sitting EOB from 93-95% on 2L via NC. Patient performs transfers and ambulation using RW at supervision/SB level of assistance. She has slow gait speed, decreased B step length, and required multiple standing rest breaks to ambulate 10 ft (2 trials). Educated pt on pursed lip breathing and energy conservation techniques; required vc's for breathing pattern while ambulating. She c/o SOB during gait, but her O2 sats remained in mid 90's at 95-96%. Patient left sitting EOB and verbalizes understanding to use call bell for OOB. Based on the objective data described below, the patient presents with decreased activity tolerance, impaired balance, and decreased safety with functional mobility. Patient will benefit from skilled intervention to address the above impairments.   Patient's rehabilitation potential is considered to be Good  Factors which may influence rehabilitation potential include:   []         None noted  []         Mental ability/status  [x]         Medical condition  []         Home/family situation and support systems  []         Safety awareness  []         Pain tolerance/management  []         Other:      PLAN :  Recommendations and Planned Interventions:   []           Bed Mobility Training             [x]    Neuromuscular Re-Education  [x]           Transfer Training                   []    Orthotic/Prosthetic Training  [x]           Gait Training                          []    Modalities  [x]           Therapeutic Exercises           []    Edema Management/Control  [x]           Therapeutic Activities            []    Family Training/Education  [x]           Patient Education  []           Other (comment):    Frequency/Duration: Patient will be followed by physical therapy 1-2 times per day/4-7 days per week to address goals. Discharge Recommendations: Home Health  Further Equipment Recommendations for Discharge: N/A     SUBJECTIVE:   Patient stated I can usually walk a little further at home before feeling SOB.     OBJECTIVE DATA SUMMARY:     Past Medical History:   Diagnosis Date    Asthma     Chronic lung disease     COPD     Cystocele, midline     Diabetes mellitus ([address]     GERD (gastroesophageal reflux disease)     Hidradenitis suppurativa     Hyperlipidemia     Hypertension     SHERRIE on CPAP     CPAP    Stress incontinence      Past Surgical History:   Procedure Laterality Date    BREAST SURGERY PROCEDURE UNLISTED      Right breast benign tumor removal    HX APPENDECTOMY      HX CHOLECYSTECTOMY      HX DILATION AND CURETTAGE      Dysfunctional uterine bleeding, thought 2/2 fibroids    HX TUBAL LIGATION       Barriers to Learning/Limitations: None  Compensate with: N/A  Home Situation:  Home Situation  Home Environment: Private residence  # Steps to Enter: 2  One/Two Story Residence: Two story  # of Interior Steps: 14  Height of Each Step (in): 2 inches  Interior Rails: Right  Lift Chair Available: No  Living Alone: No  Support Systems: Child(kim), Family member(s)  Patient Expects to be Discharged to[de-identified] Private residence  Current DME Used/Available at Home: CPAP, Oxygen, portable, Shower chair, Walker  Critical Behavior:     Orientation Level: Oriented X4  Cognition: Follows commands     Psychosocial  Purposeful Interaction: Yes  Pt Identified Daily Priority: Clinical issues (comment)  Caritas Process: Nurture loving kindness; Attend basic human needs  Caring Interventions: Reassure; Therapeutic modalities  Reassure: Therapeutic listening;Caring rounds  Therapeutic Modalities: Humor  Skin Condition/Temp: Dry;Warm     Skin Integrity: Intact  Skin Integumentary  Skin Color: Ecchymosis (comment)(abd )  Skin Condition/Temp: Dry;Warm  Skin Integrity: Intact  Turgor: Non-tenting  Hair Growth: Present  Varicosities: Absent     Strength:    Strength: Within functional limits      Tone & Sensation:   Tone: Normal  Sensation: Intact       Functional Mobility:  Bed Mobility:     Supine to Sit: Stand-by assistance  Sit to Supine: Contact guard assistance     Transfers:  Sit to Stand: Supervision  Stand to Sit: Supervision    Balance:   Sitting: Intact  Standing: Impaired; With support  Standing - Static: Good  Standing - Dynamic : Fair((+))    Ambulation/Gait Training:  Distance (ft): 10 Feet (ft)(x2( ( trials) )  Assistive Device: Walker, rolling  Speed/Pam: Slow  Step Length: Right shortened;Left shortened       Pain:  Pain level pre-treatment: 5/10  abdominal pain  Pain level post-treatment: 5/10   Pain Intervention(s) : Medication (see MAR); Rest, Ice, Repositioning  Response to intervention: Nurse notified, See doc flow    Activity Tolerance:   Fair  Please refer to the flowsheet for vital signs taken during this treatment.   After treatment:   [x]         Patient left in no apparent distress sitting EOB  []         Patient left in no apparent distress in bed  []         Call bell left within reach  []         Nursing notified  []         Caregiver present  []         Bed alarm activated  []         SCDs applied    COMMUNICATION/EDUCATION:   [x]         Role of Physical Therapy in the acute care setting. [x]         Fall prevention education was provided and the patient/caregiver indicated understanding. [x]         Patient/family have participated as able in goal setting and plan of care. []         Patient/family agree to work toward stated goals and plan of care. []         Patient understands intent and goals of therapy, but is neutral about his/her participation. []         Patient is unable to participate in goal setting/plan of care: ongoing with therapy staff.  []         Other:     Thank you for this referral.  Rohini Conley, PT   Time Calculation: 23 mins      Eval Complexity: History: LOW Complexity : Zero comorbidities / personal factors that will impact the outcome / POCExam:LOW Complexity : 1-2 Standardized tests and measures addressing body structure, function, activity limitation and / or participation in recreation  Presentation: LOW Complexity : Stable, uncomplicated  Clinical Decision Making:Low Complexity    Overall Complexity:LOW

## 2020-04-09 NOTE — PROGRESS NOTES
Problem: Self Care Deficits Care Plan (Adult)  Goal: *Acute Goals and Plan of Care (Insert Text)  Outcome: Resolved/Met     OCCUPATIONAL THERAPY EVALUATION/DISCHARGE    Patient: Malena Jamison (1 y.o. female)  Date: 4/9/2020  Primary Diagnosis: COPD exacerbation ([facility] [address] [J44.1]   Precautions: Fall  PLOF: Patient was modified independent with self-care and used a RW for functional mobility PTA. Patient has all DME and PCA 6 hours a day just to assist prn. ASSESSMENT AND RECOMMENDATIONS:  Patient cleared to participate in OT evaluation by RN. Upon entering the room, patient was supine in bed, alert, and agreeable to participate in OT evaluation. Patient familiar with OT as she has had 2 previous admissions, working with OT. Patient educated on safety during this admission and energy conservation techniques, pursed lip breathing, and self pacing during daily activities. Patient reports she has been using techniques at home, however has to give many self reminders as her first thought is just to catcher her breath. Patient is modified (I) - independent with basic self-care, Supervision with bed mobility and Supervision with functional transfers using rolling walker. Patient remained between 92%-94% this session on 2L. Based on the objective data described below, the patient presents with no deficits that impede pt function with ADLs, functional transfers, and functional mobility. OT to d/c from caseload at this time. Skilled occupational therapy is not indicated at this time.   Discharge Recommendations: Home Health  Further Equipment Recommendations for Discharge: Patient has all DME      SUBJECTIVE:   Patient stated I have to remember to breath sometimes    OBJECTIVE DATA SUMMARY:     Past Medical History:   Diagnosis Date    Asthma     Chronic lung disease     COPD     Cystocele, midline     Diabetes mellitus ([facility] [address]     GERD (gastroesophageal reflux disease)     Hidradenitis suppurativa Hyperlipidemia     Hypertension     SHERRIE on CPAP     CPAP    Stress incontinence      Past Surgical History:   Procedure Laterality Date    BREAST SURGERY PROCEDURE UNLISTED      Right breast benign tumor removal    HX APPENDECTOMY      HX CHOLECYSTECTOMY      HX DILATION AND CURETTAGE      Dysfunctional uterine bleeding, thought 2/2 fibroids    HX TUBAL LIGATION       Barriers to Learning/Limitations: None  Compensate with: visual, verbal, tactile, kinesthetic cues/model    Home Situation:   Home Situation  Home Environment: Private residence  # Steps to Enter: 2  One/Two Story Residence: Two story  # of Interior Steps: 14  Height of Each Step (in): 2 inches  Interior Rails: Right  Lift Chair Available: No  Living Alone: No  Support Systems: Child(kim), Family member(s)  Patient Expects to be Discharged to[de-identified] Private residence  Current DME Used/Available at Home: CPAP, Oxygen, portable, Shower chair, Walker  Tub or Shower Type: Tub/Shower combination  []     Right hand dominant   []     Left hand dominant    Cognitive/Behavioral Status:  Neurologic State: Alert  Orientation Level: Oriented X4  Cognition: Follows commands  Safety/Judgement: Fall prevention    Skin: Intact  Edema: None noted    Vision/Perceptual:    Acuity: Within Defined Limits      Coordination: BUE  Fine Motor Skills-Upper: Left Intact; Right Intact    Gross Motor Skills-Upper: Left Intact; Right Intact    Balance:  Sitting: Intact  Standing: Impaired; With support  Standing - Static: Good  Standing - Dynamic : Fair(+)    Strength: BUE  Strength: Generally decreased, functional    Tone & Sensation: BUE  Tone: Normal  Sensation: Intact    Range of Motion: BUE  AROM: Within functional limits      Functional Mobility and Transfers for ADLs:  Bed Mobility:  Supine to Sit: Supervision  Sit to Supine: Supervision  Scooting: Supervision    Transfers:  Sit to Stand: Supervision  Stand to Sit: Supervision      ADL Assessment:  Feeding: Independent    Oral Facial Hygiene/Grooming: Modified Independent    Bathing: Modified independent    Upper Body Dressing: Modified independent    Lower Body Dressing: Supervision(standing)    Toileting: Modified independent    ADL Intervention:    Grooming  Grooming Assistance: Modified independent  Washing Face: Modified independent  Brushing/Combing Hair: Modified independent      Upper Body Dressing Assistance  Dressing Assistance: Modified independent  Hospital Gown: Modified independent  Front Opened Shirt: Modified independent(robe)    Lower Body Dressing Assistance  Dressing Assistance: Modified independent  Socks: Modified independent  Leg Crossed Method Used: Yes  Position Performed: Seated edge of bed      Cognitive Retraining  Safety/Judgement: Fall prevention    Therapeutic Exercise:  Patient educated on diaphragmatic breathing this session and able to perform task for 2 minutes seated EOB. Pain:  Pain level pre-treatment: 0/10   Pain level post-treatment: 3/10, abdomen   Pain Intervention(s): Medication (see MAR); Response to intervention: Nurse notified, See doc flow    Activity Tolerance:   Fair    Please refer to the flowsheet for vital signs taken during this treatment. After treatment:   []  Patient left in no apparent distress sitting up in chair  [x]  Patient left in no apparent distress in bed  [x]  Call bell left within reach  [x]  Nursing notified  []  Caregiver present  []  Bed alarm activated    COMMUNICATION/EDUCATION:   [x]      Role of Occupational Therapy in the acute care setting  [x]      Home safety education was provided and the patient/caregiver indicated understanding. [x]      Patient/family have participated as able and agree with findings and recommendations. []      Patient is unable to participate in plan of care at this time.     Thank you for this referral.  Maurice Meyers, OTR/L  Time Calculation: 40 mins      Eval Complexity: History: MEDIUM Complexity : Expanded review of history including physical, cognitive and psychosocial  history ; Examination: LOW Complexity : 1-3 performance deficits relating to physical, cognitive , or psychosocial skils that result in activity limitations and / or participation restrictions ;    Decision Making:LOW Complexity : No comorbidities that affect functional and no verbal or physical assistance needed to complete eval tasks

## 2020-04-09 NOTE — PROGRESS NOTES
CM received verbal consent by patient for [facility] for [facility]. FOC on the patient's chart, no Home Health order received yet.     Lilibeth Figueroa, RN  Case Management [phone]

## 2020-04-10 LAB
ANION GAP SERPL CALC-SCNC: 8 MMOL/L (ref 3–18)
BASOPHILS # BLD: 0 K/UL (ref 0–0.1)
BASOPHILS NFR BLD: 0 % (ref 0–2)
BUN SERPL-MCNC: 32 MG/DL (ref 7–18)
BUN/CREAT SERPL: 29 (ref 12–20)
CALCIUM SERPL-MCNC: 9.3 MG/DL (ref 8.5–10.1)
CHLORIDE SERPL-SCNC: 103 MMOL/L (ref 100–111)
CO2 SERPL-SCNC: 26 MMOL/L (ref 21–32)
CREAT SERPL-MCNC: 1.1 MG/DL (ref 0.6–1.3)
DIFFERENTIAL METHOD BLD: ABNORMAL
EOSINOPHIL # BLD: 0 K/UL (ref 0–0.4)
EOSINOPHIL NFR BLD: 0 % (ref 0–5)
ERYTHROCYTE [DISTWIDTH] IN BLOOD BY AUTOMATED COUNT: 13.8 % (ref 11.6–14.5)
GLUCOSE BLD STRIP.AUTO-MCNC: 131 MG/DL (ref 70–110)
GLUCOSE BLD STRIP.AUTO-MCNC: 158 MG/DL (ref 70–110)
GLUCOSE BLD STRIP.AUTO-MCNC: 256 MG/DL (ref 70–110)
GLUCOSE BLD STRIP.AUTO-MCNC: 267 MG/DL (ref 70–110)
GLUCOSE SERPL-MCNC: 147 MG/DL (ref 74–99)
HCT VFR BLD AUTO: 38.8 % (ref 35–45)
HGB BLD-MCNC: 12.9 G/DL (ref 12–16)
LYMPHOCYTES # BLD: 3.1 K/UL (ref 0.9–3.6)
LYMPHOCYTES NFR BLD: 18 % (ref 21–52)
MCH RBC QN AUTO: 30 PG (ref 24–34)
MCHC RBC AUTO-ENTMCNC: 33.2 G/DL (ref 31–37)
MCV RBC AUTO: 90.2 FL (ref 74–97)
MONOCYTES # BLD: 1.1 K/UL (ref 0.05–1.2)
MONOCYTES NFR BLD: 7 % (ref 3–10)
NEUTS SEG # BLD: 12.6 K/UL (ref 1.8–8)
NEUTS SEG NFR BLD: 75 % (ref 40–73)
PLATELET # BLD AUTO: 306 K/UL (ref 135–420)
PMV BLD AUTO: 9.4 FL (ref 9.2–11.8)
POTASSIUM SERPL-SCNC: 3.8 MMOL/L (ref 3.5–5.5)
RBC # BLD AUTO: 4.3 M/UL (ref 4.2–5.3)
SODIUM SERPL-SCNC: 137 MMOL/L (ref 136–145)
WBC # BLD AUTO: 16.9 K/UL (ref 4.6–13.2)

## 2020-04-10 PROCEDURE — 74011636637 HC RX REV CODE- 636/637: Performed by: STUDENT IN AN ORGANIZED HEALTH CARE EDUCATION/TRAINING PROGRAM

## 2020-04-10 PROCEDURE — 80048 BASIC METABOLIC PNL TOTAL CA: CPT

## 2020-04-10 PROCEDURE — 74011250637 HC RX REV CODE- 250/637: Performed by: STUDENT IN AN ORGANIZED HEALTH CARE EDUCATION/TRAINING PROGRAM

## 2020-04-10 PROCEDURE — 97530 THERAPEUTIC ACTIVITIES: CPT

## 2020-04-10 PROCEDURE — 74011250636 HC RX REV CODE- 250/636: Performed by: STUDENT IN AN ORGANIZED HEALTH CARE EDUCATION/TRAINING PROGRAM

## 2020-04-10 PROCEDURE — 74011000250 HC RX REV CODE- 250: Performed by: STUDENT IN AN ORGANIZED HEALTH CARE EDUCATION/TRAINING PROGRAM

## 2020-04-10 PROCEDURE — 82962 GLUCOSE BLOOD TEST: CPT

## 2020-04-10 PROCEDURE — 65270000029 HC RM PRIVATE

## 2020-04-10 PROCEDURE — 85025 COMPLETE CBC W/AUTO DIFF WBC: CPT

## 2020-04-10 PROCEDURE — 97535 SELF CARE MNGMENT TRAINING: CPT

## 2020-04-10 PROCEDURE — 36415 COLL VENOUS BLD VENIPUNCTURE: CPT

## 2020-04-10 PROCEDURE — 81003 URINALYSIS AUTO W/O SCOPE: CPT

## 2020-04-10 PROCEDURE — 94640 AIRWAY INHALATION TREATMENT: CPT

## 2020-04-10 PROCEDURE — 74011636637 HC RX REV CODE- 636/637: Performed by: FAMILY MEDICINE

## 2020-04-10 RX ORDER — ACETAMINOPHEN 325 MG/1
650 TABLET ORAL
Status: DISCONTINUED | OUTPATIENT
Start: 2020-04-10 | End: 2020-04-10

## 2020-04-10 RX ORDER — PREDNISONE 20 MG/1
60 TABLET ORAL
Status: DISCONTINUED | OUTPATIENT
Start: 2020-04-11 | End: 2020-04-12 | Stop reason: HOSPADM

## 2020-04-10 RX ORDER — LIDOCAINE 4 G/100G
1 PATCH TOPICAL EVERY 24 HOURS
Status: DISCONTINUED | OUTPATIENT
Start: 2020-04-10 | End: 2020-04-12 | Stop reason: HOSPADM

## 2020-04-10 RX ORDER — IPRATROPIUM BROMIDE AND ALBUTEROL SULFATE 2.5; .5 MG/3ML; MG/3ML
3 SOLUTION RESPIRATORY (INHALATION)
Status: DISCONTINUED | OUTPATIENT
Start: 2020-04-10 | End: 2020-04-12 | Stop reason: HOSPADM

## 2020-04-10 RX ORDER — ACETAMINOPHEN 325 MG/1
650 TABLET ORAL
Status: DISCONTINUED | OUTPATIENT
Start: 2020-04-10 | End: 2020-04-12 | Stop reason: HOSPADM

## 2020-04-10 RX ORDER — SIMETHICONE 80 MG
40 TABLET,CHEWABLE ORAL
Status: DISCONTINUED | OUTPATIENT
Start: 2020-04-10 | End: 2020-04-12 | Stop reason: HOSPADM

## 2020-04-10 RX ADMIN — IPRATROPIUM BROMIDE AND ALBUTEROL SULFATE 3 ML: .5; 3 SOLUTION RESPIRATORY (INHALATION) at 01:22

## 2020-04-10 RX ADMIN — ACETAMINOPHEN 325 MG: 325 TABLET ORAL at 00:01

## 2020-04-10 RX ADMIN — INSULIN GLARGINE 35 UNITS: 100 INJECTION, SOLUTION SUBCUTANEOUS at 22:25

## 2020-04-10 RX ADMIN — FLUTICASONE PROPIONATE 2 SPRAY: 50 SPRAY, METERED NASAL at 09:00

## 2020-04-10 RX ADMIN — HYDROCHLOROTHIAZIDE 12.5 MG: 25 TABLET ORAL at 09:17

## 2020-04-10 RX ADMIN — PANTOPRAZOLE SODIUM 40 MG: 40 TABLET, DELAYED RELEASE ORAL at 09:18

## 2020-04-10 RX ADMIN — BUDESONIDE 250 MCG: 0.25 SUSPENSION RESPIRATORY (INHALATION) at 19:40

## 2020-04-10 RX ADMIN — INSULIN LISPRO 9 UNITS: 100 INJECTION, SOLUTION INTRAVENOUS; SUBCUTANEOUS at 13:04

## 2020-04-10 RX ADMIN — INSULIN LISPRO 9 UNITS: 100 INJECTION, SOLUTION INTRAVENOUS; SUBCUTANEOUS at 17:24

## 2020-04-10 RX ADMIN — LISINOPRIL 20 MG: 20 TABLET ORAL at 09:18

## 2020-04-10 RX ADMIN — INSULIN LISPRO 3 UNITS: 100 INJECTION, SOLUTION INTRAVENOUS; SUBCUTANEOUS at 22:26

## 2020-04-10 RX ADMIN — ASPIRIN 81 MG: 81 TABLET, COATED ORAL at 09:18

## 2020-04-10 RX ADMIN — AZITHROMYCIN MONOHYDRATE 250 MG: 250 TABLET ORAL at 09:00

## 2020-04-10 RX ADMIN — ARFORMOTEROL TARTRATE 15 MCG: 15 SOLUTION RESPIRATORY (INHALATION) at 19:40

## 2020-04-10 RX ADMIN — IPRATROPIUM BROMIDE AND ALBUTEROL SULFATE 3 ML: .5; 3 SOLUTION RESPIRATORY (INHALATION) at 19:41

## 2020-04-10 RX ADMIN — HEPARIN SODIUM 5000 UNITS: 5000 INJECTION INTRAVENOUS; SUBCUTANEOUS at 09:17

## 2020-04-10 RX ADMIN — IPRATROPIUM BROMIDE AND ALBUTEROL SULFATE 3 ML: .5; 3 SOLUTION RESPIRATORY (INHALATION) at 12:13

## 2020-04-10 RX ADMIN — HEPARIN SODIUM 5000 UNITS: 5000 INJECTION INTRAVENOUS; SUBCUTANEOUS at 17:24

## 2020-04-10 RX ADMIN — ACETAMINOPHEN 650 MG: 325 TABLET ORAL at 03:24

## 2020-04-10 RX ADMIN — HEPARIN SODIUM 5000 UNITS: 5000 INJECTION INTRAVENOUS; SUBCUTANEOUS at 00:01

## 2020-04-10 RX ADMIN — METHYLPREDNISOLONE SODIUM SUCCINATE 40 MG: 40 INJECTION, POWDER, FOR SOLUTION INTRAMUSCULAR; INTRAVENOUS at 09:17

## 2020-04-10 NOTE — PROGRESS NOTES
Chart reviewed. CM attempted x 2 to meet with pt to discuss returning home vs going to a facility upon d/c. FOC already completed for [facility] if needed and pt has personal care M-F [id number],. CM continues to follow along for d/c planning.     Franky Kerr RN BSN  Care Manager  [phone]
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
## 2020-04-10 NOTE — PROGRESS NOTES
Bedside and Verbal shift change report given to Akilah Sellers (oncoming nurse) by Mesfin Rodrigez (offgoing nurse). Report included the following information SBAR, Kardex, Procedure Summary, MAR and Recent Results.

## 2020-04-10 NOTE — PROGRESS NOTES
Problem: Mobility Impaired (Adult and Pediatric)  Goal: *Acute Goals and Plan of Care (Insert Text)  Description: Physical Therapy Goals  Initiated 4/9/2020 and to be accomplished within 7 day(s)  1. Patient will move from supine to sit and sit to supine  in bed with modified independence. 2.  Patient will transfer from bed to chair and chair to bed with modified independence using the least restrictive device. 3.  Patient will perform sit to stand with modified independence. 4.  Patient will ambulate with modified independence for 40 feet with the least restrictive device. PLOF: Pt reports she has aid 5 deleon/week for 6 hours, assists with bathing and dressing. Patient ambulates inside home holding onto furniture and has RW. Outcome: Progressing Towards Goal   PHYSICAL THERAPY TREATMENT    Patient: Elfego Miranda (20 y.o. female)  Date: 4/10/2020  Diagnosis: COPD exacerbation ([facility] [address] [J44.1]   COPD exacerbation ([facility] [address]       Precautions: Fall      ASSESSMENT:  Pt found seated in chair willing to participate w/ therapy, however mobility deferred at this time 2/2 increased fatigue from recent therapy services and abdominal discomfort. Pt provided education on pacing for energy conservation and use of chairs in home for safety as pt voices only able to amb for 10' at a time currently prior to increase in fatigue and need to sit and rest. Pt also educated on importance of safety and use of AD if needed as pt indicates she intermittently utilizes surfaces for support in room. Pt also educated on possible use of rollator post DC and will cont to access mobility and balance while in hospital to ensure use is safe. Notified nursing pt would benefit from O2 hose extension (instructed pt to ask RT for hose during next tx) for safe mobility in room when amb to bathroom. Pt and nursing voiced understanding. Will follow up at later date to continue progressing w/ mobility and safety when appropriate. Progression toward goals: good   [x]      Improving appropriately and progressing toward goals  []      Improving slowly and progressing toward goals  []      Not making progress toward goals and plan of care will be adjusted     PLAN:  Patient continues to benefit from skilled intervention to address the above impairments. Continue treatment per established plan of care. Discharge Recommendations:  Home Health w/ assistance   Further Equipment Recommendations for Discharge: possibly rollator      SUBJECTIVE:   Patient stated I have a regular walker, not a rollator.     OBJECTIVE DATA SUMMARY:   Critical Behavior:  Neurologic State: Alert  Orientation Level: Oriented X4  Cognition: Follows commands  Safety/Judgement: Fall prevention  Functional Mobility Training:  Bed Mobility:  Supine to Sit: Additional time;Modified independent  Transfers:  Sit to Stand: Modified independent; Additional time  Bed to Chair: Modified independent; Additional time  Balance:  Sitting: Intact  Standing: Intact; Without support  Standing - Static: Good  Standing - Dynamic : Fair(fair plus)      Pain:  Voiced increased discomfort in abdomen     Activity Tolerance:   Good   Please refer to the flowsheet for vital signs taken during this treatment. After treatment:   [x] Patient left in no apparent distress sitting up in chair  [] Patient left in no apparent distress in bed  [x] Call bell left within reach  [x] Nursing notified  [] Caregiver present  [] Bed alarm activated  [] SCDs applied      COMMUNICATION/EDUCATION:   [x]         Role of Physical Therapy in the acute care setting. [x]         Fall prevention education was provided and the patient/caregiver indicated understanding. [x]         Patient/family have participated as able in working toward goals and plan of care. [x]         Patient/family agree to work toward stated goals and plan of care.   []         Patient understands intent and goals of therapy, but is neutral about his/her participation.   []         Patient is unable to participate in stated goals/plan of care: ongoing with therapy staff.  []         Other:        Ange Hill PTA   Time Calculation: 11 mins

## 2020-04-10 NOTE — PROGRESS NOTES
conducted an initial consultation and Spiritual Assessment for Anyi Griggs, who is a 60 y.o.,female. Patients Primary Language is: [place]. According to the patients EMR Mandaen Affiliation is: Jak Wiley.     The reason the Patient came to the hospital is:   Patient Active Problem List    Diagnosis Date Noted    Atelectasis of right lung 12/14/2018    Acute exacerbation of chronic obstructive pulmonary disease (COPD) ([address] 10/07/2018    Bilateral carotid bruits 07/30/2018    Palpitations 07/30/2018    Type 2 diabetes with nephropathy ([address] 05/30/2018    Hyperlipidemia 01/24/2018    COPD with acute bronchitis ([address] 88/12/6240    Diastolic CHF, chronic ([address] 02/09/2017    Diverticulosis 02/09/2017    COPD exacerbation ([address] 02/08/2017    Acute exacerbation of COPD with asthma ([address] 02/08/2017    Obesity, Class III, BMI 40-49.9 (morbid obesity) ([address] 08/09/2016    Chest pain 08/09/2016    Dyspnea 08/09/2016    COPD with acute exacerbation ([address] 05/24/2015    COPD (chronic obstructive pulmonary disease) (HCC) 03/27/2015    Allergic rhinitis 03/27/2015    Essential hypertension, benign 09/30/2012    Diabetes mellitus, type 2 ([address] 09/30/2012    Esophageal reflux 09/30/2012    SHERRIE on CPAP         The  provided the following Interventions:  Initiated a relationship of care and support. Listened empathically. Provided information about Spiritual Care Services. Offered prayer and assurance of continued prayers on patient's behalf. Chart reviewed. The following outcomes where achieved:  Patient shared limited information about both their medical narrative and spiritual journey/beliefs. Patient processed feeling about current hospitalization. Patient expressed gratitude for 's visit. Assessment:  Patient does not have any Sabianism/cultural needs that will affect patients preferences in health care.   There are no spiritual or Sabianism issues which require intervention at this time. Plan:  Chaplains will continue to follow and will provide pastoral care on an as needed/requested basis.  recommends bedside caregivers page  on duty if patient shows signs of acute spiritual or emotional distress.     [id number] [facility]   [phone]

## 2020-04-10 NOTE — PROGRESS NOTES
[address]  PM Check Note  SUBJECTIVE  Pt seen at bedside. Pt stated that she is feeling much better today compared to yesterday. She also stated that she spoke to her daughter who is looking for another place for them to live. She stated that she feels like the IV steriods are helping her and that she usually gets to 20mg on prednisone tapers before she needs to be readmitted through the ED. OBJECTIVE  Visit Vitals  /60 (BP 1 Location: Left arm, BP Patient Position: At rest)   Pulse 77   Temp 97 °F (36.1 °C)   Resp 18   Wt 114.3 kg (252 lb)   SpO2 93%   Breastfeeding No   BMI 44.64 kg/m²       Recent Results (from the past 12 hour(s))   GLUCOSE, POC    Collection Time: 04/09/20 11:03 AM   Result Value Ref Range    Glucose (POC) 285 (H) 70 - 110 mg/dL   EKG, 12 LEAD, INITIAL    Collection Time: 04/09/20  1:10 PM   Result Value Ref Range    Ventricular Rate 84 BPM    Atrial Rate 84 BPM    P-R Interval 148 ms    QRS Duration 88 ms    Q-T Interval 382 ms    QTC Calculation (Bezet) 451 ms    Calculated P Axis 47 degrees    Calculated R Axis 14 degrees    Calculated T Axis 52 degrees    Diagnosis       Normal sinus rhythm  Normal ECG  When compared with ECG of 30-MAR-2020 18:04,  No significant change was found  Confirmed by Mark Hardwick MD, Samira Duran ([id number]) on 4/9/2020 1:35:31 PM     GLUCOSE, POC    Collection Time: 04/09/20  3:45 PM   Result Value Ref Range    Glucose (POC) 329 (H) 70 - 110 mg/dL       Physical examination  Consitutional: NAD, AAO  Cardiovascular: RRR, no m/g/r  Respiratory: On 2 L NC at time of exam. Slightly decreased air movement however improved compared to yesterday. No wheezes appreciate in posterior and anterior lung fields  Abdominal: Abdomen soft, Slight tenderness in the RLQ that radiates to her umbilcus    ASSESSMENT/PLAN  VSS. No change in management at this time.     Rest per AM Note    Ember Causey MD, PGY-1   [facility]   April 9, 2020, 10:02 PM

## 2020-04-10 NOTE — PROGRESS NOTES
Problem: Diabetes Maintenance:Admission  Goal: *Blood glucose 80 to 180 mg/dl  Outcome: Progressing Towards Goal     Problem: Falls - Risk of  Goal: *Absence of Falls  Description: Document Ricarda Fall Risk and appropriate interventions in the flowsheet. Outcome: Progressing Towards Goal  Note: Fall Risk Interventions:            Medication Interventions: Bed/chair exit alarm, Patient to call before getting OOB, Teach patient to arise slowly                   Problem: Patient Education: Go to Patient Education Activity  Goal: Patient/Family Education  Outcome: Progressing Towards Goal     Problem: Chronic Obstructive Pulmonary Disease (COPD)  Goal: *Oxygen saturation during activity within specified parameters  Outcome: Progressing Towards Goal  Goal: *Able to remain out of bed as prescribed  Outcome: Progressing Towards Goal  Goal: *Absence of hypoxia  Outcome: Progressing Towards Goal  Goal: *Optimize nutritional status  Outcome: Progressing Towards Goal     Problem: Patient Education: Go to Patient Education Activity  Goal: Patient/Family Education  Outcome: Progressing Towards Goal     Problem: Diabetes Self-Management  Goal: *Disease process and treatment process  Description: Define diabetes and identify own type of diabetes; list 3 options for treating diabetes. Outcome: Progressing Towards Goal  Goal: *Incorporating nutritional management into lifestyle  Description: Describe effect of type, amount and timing of food on blood glucose; list 3 methods for planning meals. Outcome: Progressing Towards Goal  Goal: *Incorporating physical activity into lifestyle  Description: State effect of exercise on blood glucose levels. Outcome: Progressing Towards Goal  Goal: *Developing strategies to promote health/change behavior  Description: Define the ABC's of diabetes; identify appropriate screenings, schedule and personal plan for screenings.   Outcome: Progressing Towards Goal  Goal: *Using medications safely  Description: State effect of diabetes medications on diabetes; name diabetes medication taking, action and side effects. Outcome: Progressing Towards Goal  Goal: *Monitoring blood glucose, interpreting and using results  Description: Identify recommended blood glucose targets  and personal targets. Outcome: Progressing Towards Goal  Goal: *Prevention, detection, treatment of acute complications  Description: List symptoms of hyper- and hypoglycemia; describe how to treat low blood sugar and actions for lowering  high blood glucose level. Outcome: Progressing Towards Goal  Goal: *Prevention, detection and treatment of chronic complications  Description: Define the natural course of diabetes and describe the relationship of blood glucose levels to long term complications of diabetes.   Outcome: Progressing Towards Goal  Goal: *Developing strategies to address psychosocial issues  Description: Describe feelings about living with diabetes; identify support needed and support network  Outcome: Progressing Towards Goal  Goal: *Sick day guidelines  Outcome: Progressing Towards Goal  Goal: *Patient Specific Goal (EDIT GOAL, INSERT TEXT)  Outcome: Progressing Towards Goal     Problem: Patient Education: Go to Patient Education Activity  Goal: Patient/Family Education  Outcome: Progressing Towards Goal

## 2020-04-10 NOTE — DIABETES MGMT
GLYCEMIC CONTROL PLAN OF CARE     Assessment/Recommendations:  Fasting lab glucose this am 147 mg/dl  Much improved from yesterday. Noted pt receiving steroids. Continue basal and corrective insulin coverage as ordered. Patient already receiving very insulin resistant dosing. All postprandial BG results elevated. Patient would benefit from mealtime lispro 3 units 3 times daily with meals. Will continue inpatient monitoring and assess need for insulin adjustment. Most recent blood glucose values:  Results for Alvarez Salcedo (MRN [id number]) as of 4/10/2020 12:14   Ref. Range 4/9/2020 11:03 4/9/2020 15:45 4/9/2020 22:01 4/10/2020 07:28 4/10/2020 11:07   GLUCOSE,FAST - POC Latest Ref Range: 70 - 110 mg/dL 285 (H) 329 (H) 209 (H) 131 (H) 256 (H)       Current A1C of 8.2 % is equivalent to average blood glucose of 189 mg/dl over the past 2-3 months. Current hospital diabetes medications:   lantus 35 units every bedtime  Lispro corrective insulin coverage AC&HS  Home diabetes medications:  Basaglar 35 units daily  Novolog tid with meals based on BG readings  Diet:    Diabetic consistent carb.  No concentrated sweets  Education:  _x__Refer to Diabetes Education Record             ____Education not indicated at this time      Mei Neri [facility] CDE  Ext [phone]

## 2020-04-11 VITALS
DIASTOLIC BLOOD PRESSURE: 73 MMHG | SYSTOLIC BLOOD PRESSURE: 123 MMHG | HEART RATE: 74 BPM | BODY MASS INDEX: 44.56 KG/M2 | TEMPERATURE: 97.8 F | WEIGHT: 251.54 LBS | RESPIRATION RATE: 16 BRPM | OXYGEN SATURATION: 98 %

## 2020-04-11 LAB
ANION GAP SERPL CALC-SCNC: 7 MMOL/L (ref 3–18)
APPEARANCE UR: CLEAR
BASOPHILS # BLD: 0 K/UL (ref 0–0.1)
BASOPHILS NFR BLD: 0 % (ref 0–2)
BILIRUB UR QL: NEGATIVE
BUN SERPL-MCNC: 25 MG/DL (ref 7–18)
BUN/CREAT SERPL: 27 (ref 12–20)
CALCIUM SERPL-MCNC: 9.3 MG/DL (ref 8.5–10.1)
CHLORIDE SERPL-SCNC: 99 MMOL/L (ref 100–111)
CO2 SERPL-SCNC: 29 MMOL/L (ref 21–32)
COLOR UR: YELLOW
CREAT SERPL-MCNC: 0.93 MG/DL (ref 0.6–1.3)
DIFFERENTIAL METHOD BLD: ABNORMAL
EOSINOPHIL # BLD: 0 K/UL (ref 0–0.4)
EOSINOPHIL NFR BLD: 0 % (ref 0–5)
ERYTHROCYTE [DISTWIDTH] IN BLOOD BY AUTOMATED COUNT: 13.8 % (ref 11.6–14.5)
GLUCOSE BLD STRIP.AUTO-MCNC: 118 MG/DL (ref 70–110)
GLUCOSE BLD STRIP.AUTO-MCNC: 120 MG/DL (ref 70–110)
GLUCOSE BLD STRIP.AUTO-MCNC: 292 MG/DL (ref 70–110)
GLUCOSE SERPL-MCNC: 154 MG/DL (ref 74–99)
GLUCOSE UR STRIP.AUTO-MCNC: NEGATIVE MG/DL
HCT VFR BLD AUTO: 38.7 % (ref 35–45)
HGB BLD-MCNC: 12.8 G/DL (ref 12–16)
HGB UR QL STRIP: NEGATIVE
KETONES UR QL STRIP.AUTO: NEGATIVE MG/DL
LEUKOCYTE ESTERASE UR QL STRIP.AUTO: NEGATIVE
LYMPHOCYTES # BLD: 4 K/UL (ref 0.9–3.6)
LYMPHOCYTES NFR BLD: 30 % (ref 21–52)
MCH RBC QN AUTO: 29.9 PG (ref 24–34)
MCHC RBC AUTO-ENTMCNC: 33.1 G/DL (ref 31–37)
MCV RBC AUTO: 90.4 FL (ref 74–97)
MONOCYTES # BLD: 0.7 K/UL (ref 0.05–1.2)
MONOCYTES NFR BLD: 6 % (ref 3–10)
NEUTS SEG # BLD: 8.5 K/UL (ref 1.8–8)
NEUTS SEG NFR BLD: 64 % (ref 40–73)
NITRITE UR QL STRIP.AUTO: NEGATIVE
PH UR STRIP: 5 [PH] (ref 5–8)
PLATELET # BLD AUTO: 304 K/UL (ref 135–420)
PMV BLD AUTO: 9.4 FL (ref 9.2–11.8)
POTASSIUM SERPL-SCNC: 3.6 MMOL/L (ref 3.5–5.5)
PROT UR STRIP-MCNC: NEGATIVE MG/DL
RBC # BLD AUTO: 4.28 M/UL (ref 4.2–5.3)
SODIUM SERPL-SCNC: 135 MMOL/L (ref 136–145)
SP GR UR REFRACTOMETRY: 1.02 (ref 1–1.03)
UROBILINOGEN UR QL STRIP.AUTO: 0.2 EU/DL (ref 0.2–1)
WBC # BLD AUTO: 13.3 K/UL (ref 4.6–13.2)

## 2020-04-11 PROCEDURE — 74011636637 HC RX REV CODE- 636/637: Performed by: FAMILY MEDICINE

## 2020-04-11 PROCEDURE — 74011636637 HC RX REV CODE- 636/637: Performed by: STUDENT IN AN ORGANIZED HEALTH CARE EDUCATION/TRAINING PROGRAM

## 2020-04-11 PROCEDURE — 82962 GLUCOSE BLOOD TEST: CPT

## 2020-04-11 PROCEDURE — 85025 COMPLETE CBC W/AUTO DIFF WBC: CPT

## 2020-04-11 PROCEDURE — 80048 BASIC METABOLIC PNL TOTAL CA: CPT

## 2020-04-11 PROCEDURE — 74011000250 HC RX REV CODE- 250: Performed by: STUDENT IN AN ORGANIZED HEALTH CARE EDUCATION/TRAINING PROGRAM

## 2020-04-11 PROCEDURE — 94640 AIRWAY INHALATION TREATMENT: CPT

## 2020-04-11 PROCEDURE — 94660 CPAP INITIATION&MGMT: CPT

## 2020-04-11 PROCEDURE — 36415 COLL VENOUS BLD VENIPUNCTURE: CPT

## 2020-04-11 PROCEDURE — 77010033678 HC OXYGEN DAILY

## 2020-04-11 PROCEDURE — 74011250636 HC RX REV CODE- 250/636: Performed by: STUDENT IN AN ORGANIZED HEALTH CARE EDUCATION/TRAINING PROGRAM

## 2020-04-11 PROCEDURE — 97116 GAIT TRAINING THERAPY: CPT

## 2020-04-11 PROCEDURE — 65270000029 HC RM PRIVATE

## 2020-04-11 PROCEDURE — 74011250637 HC RX REV CODE- 250/637: Performed by: STUDENT IN AN ORGANIZED HEALTH CARE EDUCATION/TRAINING PROGRAM

## 2020-04-11 RX ORDER — PREDNISONE 20 MG/1
20 TABLET ORAL
Qty: 30 TAB | Refills: 0 | Status: SHIPPED | OUTPATIENT
Start: 2020-04-12 | End: 2020-05-22

## 2020-04-11 RX ADMIN — HEPARIN SODIUM 5000 UNITS: 5000 INJECTION INTRAVENOUS; SUBCUTANEOUS at 11:15

## 2020-04-11 RX ADMIN — ASPIRIN 81 MG: 81 TABLET, COATED ORAL at 11:14

## 2020-04-11 RX ADMIN — INSULIN LISPRO 9 UNITS: 100 INJECTION, SOLUTION INTRAVENOUS; SUBCUTANEOUS at 18:02

## 2020-04-11 RX ADMIN — PREDNISONE 60 MG: 20 TABLET ORAL at 11:14

## 2020-04-11 RX ADMIN — PANTOPRAZOLE SODIUM 40 MG: 40 TABLET, DELAYED RELEASE ORAL at 11:14

## 2020-04-11 RX ADMIN — IPRATROPIUM BROMIDE AND ALBUTEROL SULFATE 3 ML: .5; 3 SOLUTION RESPIRATORY (INHALATION) at 01:20

## 2020-04-11 RX ADMIN — IPRATROPIUM BROMIDE AND ALBUTEROL SULFATE 3 ML: .5; 3 SOLUTION RESPIRATORY (INHALATION) at 08:41

## 2020-04-11 RX ADMIN — HYDROCHLOROTHIAZIDE 12.5 MG: 25 TABLET ORAL at 11:17

## 2020-04-11 RX ADMIN — BUDESONIDE 250 MCG: 0.25 SUSPENSION RESPIRATORY (INHALATION) at 08:41

## 2020-04-11 RX ADMIN — HEPARIN SODIUM 5000 UNITS: 5000 INJECTION INTRAVENOUS; SUBCUTANEOUS at 00:16

## 2020-04-11 RX ADMIN — ARFORMOTEROL TARTRATE 15 MCG: 15 SOLUTION RESPIRATORY (INHALATION) at 08:41

## 2020-04-11 RX ADMIN — FLUTICASONE PROPIONATE 2 SPRAY: 50 SPRAY, METERED NASAL at 11:21

## 2020-04-11 RX ADMIN — IPRATROPIUM BROMIDE AND ALBUTEROL SULFATE 3 ML: .5; 3 SOLUTION RESPIRATORY (INHALATION) at 14:28

## 2020-04-11 NOTE — PROGRESS NOTES
Problem: Diabetes Maintenance:Admission  Goal: *Blood glucose 80 to 180 mg/dl  Outcome: Progressing Towards Goal     Problem: Patient Education: Go to Patient Education Activity  Goal: Patient/Family Education  Outcome: Progressing Towards Goal     Problem: Patient Education: Go to Patient Education Activity  Goal: Patient/Family Education  Outcome: Progressing Towards Goal     Problem: Falls - Risk of  Goal: *Absence of Falls  Description: Document Anthony Aceves Fall Risk and appropriate interventions in the flowsheet. Outcome: Progressing Towards Goal  Note: Fall Risk Interventions:            Medication Interventions: Patient to call before getting OOB                   Problem: Patient Education: Go to Patient Education Activity  Goal: Patient/Family Education  Outcome: Progressing Towards Goal     Problem: Chronic Obstructive Pulmonary Disease (COPD)  Goal: *Oxygen saturation during activity within specified parameters  Outcome: Progressing Towards Goal  Goal: *Able to remain out of bed as prescribed  Outcome: Progressing Towards Goal  Goal: *Absence of hypoxia  Outcome: Progressing Towards Goal  Goal: *Optimize nutritional status  Outcome: Progressing Towards Goal     Problem: Patient Education: Go to Patient Education Activity  Goal: Patient/Family Education  Outcome: Progressing Towards Goal     Problem: Diabetes Self-Management  Goal: *Disease process and treatment process  Description: Define diabetes and identify own type of diabetes; list 3 options for treating diabetes. Outcome: Progressing Towards Goal  Goal: *Incorporating nutritional management into lifestyle  Description: Describe effect of type, amount and timing of food on blood glucose; list 3 methods for planning meals. Outcome: Progressing Towards Goal  Goal: *Incorporating physical activity into lifestyle  Description: State effect of exercise on blood glucose levels.   Outcome: Progressing Towards Goal  Goal: *Developing strategies to promote health/change behavior  Description: Define the ABC's of diabetes; identify appropriate screenings, schedule and personal plan for screenings. Outcome: Progressing Towards Goal  Goal: *Using medications safely  Description: State effect of diabetes medications on diabetes; name diabetes medication taking, action and side effects. Outcome: Progressing Towards Goal  Goal: *Monitoring blood glucose, interpreting and using results  Description: Identify recommended blood glucose targets  and personal targets. Outcome: Progressing Towards Goal  Goal: *Prevention, detection, treatment of acute complications  Description: List symptoms of hyper- and hypoglycemia; describe how to treat low blood sugar and actions for lowering  high blood glucose level. Outcome: Progressing Towards Goal  Goal: *Prevention, detection and treatment of chronic complications  Description: Define the natural course of diabetes and describe the relationship of blood glucose levels to long term complications of diabetes.   Outcome: Progressing Towards Goal  Goal: *Developing strategies to address psychosocial issues  Description: Describe feelings about living with diabetes; identify support needed and support network  Outcome: Progressing Towards Goal  Goal: *Sick day guidelines  Outcome: Progressing Towards Goal  Goal: *Patient Specific Goal (EDIT GOAL, INSERT TEXT)  Outcome: Progressing Towards Goal     Problem: Patient Education: Go to Patient Education Activity  Goal: Patient/Family Education  Outcome: Progressing Towards Goal
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
## 2020-04-11 NOTE — PROGRESS NOTES
[facility]  Brief Progress Note  SUBJECTIVE  Pt seen at bedside at 2300. Pt endorsing abdominal pain mostly around the umbilical hernia. She stated that she has noticed the region around the umbilical hernia become firmer when the pain is increased. Asked pt if she had been evaluated by general surgery for the umbilical hernia in the past. She sated that she had not been evaluated for this in the past.     Pt also voluntarily stated that she was aware that she could be discharged tomorrow and stated that she would be fine with this. She is planning on going to her son's house for some time at discharge and stated that she would be moving to live with her daughter in the next week or two. OBJECTIVE  Visit Vitals  /73 (BP 1 Location: Left arm, BP Patient Position: Sitting)   Pulse 78   Temp 96.6 °F (35.9 °C)   Resp 18   Wt 113.7 kg (250 lb 9.6 oz)   SpO2 99%   Breastfeeding No   BMI 44.39 kg/m²       Recent Results (from the past 12 hour(s))   GLUCOSE, POC    Collection Time: 04/10/20 10:24 PM   Result Value Ref Range    Glucose (POC) 158 (H) 70 - 110 mg/dL       Physical examination  Consitutional: NAD, AAO  Cardiovascular: RRR, no m/g/r  Respiratory: On 2 L NC at time of exam. Slightly decreased air movement however improved. No wheezes appreciate in posterior and anterior lung fields  Abdominal: Abdomen tender to palpation mostly in the region of the umbilicus and tense in the same region on exam at PM Check    ASSESSMENT/PLAN  VSS. No change in management at this time. Abdominal pain: Pt stated that this pain was new on this admission  -Continue Tylenol 650mg q6hrs PRN  -Consider evaluation by General Surgery outpatient to determine management options given that hernia is not incarcerated at this time however may be causing discomfort for the pt.         Joanie Mills MD, PGY-1   [facility]   April 11, 2020, 3:31 AM

## 2020-04-11 NOTE — PROGRESS NOTES
Problem: Mobility Impaired (Adult and Pediatric)  Goal: *Acute Goals and Plan of Care (Insert Text)  Description: Physical Therapy Goals  Initiated 4/9/2020 and to be accomplished within 7 day(s)  1. Patient will move from supine to sit and sit to supine  in bed with modified independence. 2.  Patient will transfer from bed to chair and chair to bed with modified independence using the least restrictive device. 3.  Patient will perform sit to stand with modified independence. 4.  Patient will ambulate with modified independence for 40 feet with the least restrictive device. PLOF: Pt reports she has aid 5 deleon/week for 6 hours, assists with bathing and dressing. Patient ambulates inside home holding onto furniture and has RW. Outcome: Progressing Towards Goal     PHYSICAL THERAPY TREATMENT    Patient: Tianna Atkins (1 y.o. female)  Date: 4/11/2020  Diagnosis: COPD exacerbation ([facility] [address] [J44.1]   COPD exacerbation ([facility] [address]       Precautions: Fall      ASSESSMENT:  Pt seen for follow-up PT tx and agreeable at this time. Pt states she has been getting up and amb  in her room and is able to transfer sit <> stand this date with mod ind. Pt agreeable to gait training in the hallway and is given SBA for safety with O2 management (on 2 L chronically) and she is able to go 75 ft with SBA with no AD, decreased speed noted, at least 2 standing rest breaks for recovery with controlled breathing and pt reporting not being able to do anything else once returned to room. She returned to sitting EOB and left with all needs in place. Will continue to follow in the acute setting, continue per POC.      Progression toward goals:   [x]      Improving appropriately and progressing toward goals  []      Improving slowly and progressing toward goals  []      Not making progress toward goals and plan of care will be adjusted     PLAN:  Patient continues to benefit from skilled intervention to address the above impairments. Continue treatment per established plan of care. Discharge Recommendations:  Home Health  Further Equipment Recommendations for Discharge:  N/A     SUBJECTIVE:   Patient stated I have been getting up and walking in the room.     OBJECTIVE DATA SUMMARY:   Critical Behavior:  Neurologic State: Alert  Orientation Level: Oriented X4  Cognition: Follows commands  Safety/Judgement: Fall prevention  Functional Mobility Training:  Bed Mobility:   Pt sitting EOB upon arrival    Transfers:  Sit to Stand: Modified independent  Stand to Sit: Modified independent    Balance:  Sitting: Intact; Without support  Standing: Intact; Without support     Ambulation/Gait Training:  Distance (ft): 75 Feet (ft)  Assistive Device: (none )       Speed/Pam: Pace decreased (<100 feet/min)  Step Length: Right shortened;Left shortened    Pain:  Pain level pre-treatment: not rated- in abdomen, reports mild   Pain level post-treatment: \"    \"   Pain Intervention(s): Medication (see MAR); Rest, Repositioning      Activity Tolerance:   Good    Please refer to the flowsheet for vital signs taken during this treatment. After treatment:   [] Patient left in no apparent distress sitting up in chair  [x] Patient left in no apparent distress in bed  [x] Call bell left within reach  [x] Nursing notified  [] Caregiver present  [] Bed alarm activated  [] SCDs applied      COMMUNICATION/EDUCATION:   [x]         Role of Physical Therapy in the acute care setting. [x]         Fall prevention education was provided and the patient/caregiver indicated understanding. [x]         Patient/family have participated as able in working toward goals and plan of care. [x]         Patient/family agree to work toward stated goals and plan of care. []         Patient understands intent and goals of therapy, but is neutral about his/her participation.   []         Patient is unable to participate in stated goals/plan of care: ongoing with therapy staff.  []         Other:        Jorge Heading   Time Calculation: 8 mins

## 2020-04-11 NOTE — PROGRESS NOTES
D/C orders received. No needs identified by . Chart reviewed. Pt will be transported home by daughter or sisiter . Called [place] family who stated that no home health was needed.  available as needed.     Shelby Juarez, RN BSN  Care Manager  [phone]

## 2020-04-11 NOTE — DISCHARGE INSTRUCTIONS
Patient Education   Patient Education     DISCHARGE SUMMARY from Nurse    PATIENT INSTRUCTIONS:    After general anesthesia or intravenous sedation, for 24 hours or while taking prescription Narcotics:  · Limit your activities  · Do not drive and operate hazardous machinery  · Do not make important personal or business decisions  · Do  not drink alcoholic beverages  · If you have not urinated within 8 hours after discharge, please contact your surgeon on call. Report the following to your surgeon:  · Excessive pain, swelling, redness or odor of or around the surgical area  · Temperature over 100.5  · Nausea and vomiting lasting longer than 4 hours or if unable to take medications  · Any signs of decreased circulation or nerve impairment to extremity: change in color, persistent  numbness, tingling, coldness or increase pain  · Any questions    What to do at Home:  Recommended activity: Activity as tolerated. If you experience any of the following symptoms fever, chest pain, shortness of breath, severe bleeding, nausea or vomiting, please follow up with Primary care physician. *  Please give a list of your current medications to your Primary Care Provider. *  Please update this list whenever your medications are discontinued, doses are      changed, or new medications (including over-the-counter products) are added. *  Please carry medication information at all times in case of emergency situations. These are general instructions for a healthy lifestyle:    No smoking/ No tobacco products/ Avoid exposure to second hand smoke  Surgeon General's Warning:  Quitting smoking now greatly reduces serious risk to your health.     Obesity, smoking, and sedentary lifestyle greatly increases your risk for illness    A healthy diet, regular physical exercise & weight monitoring are important for maintaining a healthy lifestyle    You may be retaining fluid if you have a history of heart failure or if you experience any of the following symptoms:  Weight gain of 3 pounds or more overnight or 5 pounds in a week, increased swelling in our hands or feet or shortness of breath while lying flat in bed. Please call your doctor as soon as you notice any of these symptoms; do not wait until your next office visit. The discharge information has been reviewed with the patient. The patient verbalized understanding. Discharge medications reviewed with the patient and appropriate educational materials and side effects teaching were provided. Patient armband removed and shredded  ___________________________________________________________________________________________________________________________________     Gastroesophageal Reflux Disease (GERD): Care Instructions  Your Care Instructions    Gastroesophageal reflux disease (GERD) is the backward flow of stomach acid into the esophagus. The esophagus is the tube that leads from your throat to your stomach. A one-way valve prevents the stomach acid from moving up into this tube. When you have GERD, this valve does not close tightly enough. If you have mild GERD symptoms including heartburn, you may be able to control the problem with antacids or over-the-counter medicine. Changing your diet, losing weight, and making other lifestyle changes can also help reduce symptoms. Follow-up care is a key part of your treatment and safety. Be sure to make and go to all appointments, and call your doctor if you are having problems. It's also a good idea to know your test results and keep a list of the medicines you take. How can you care for yourself at home? · Take your medicines exactly as prescribed. Call your doctor if you think you are having a problem with your medicine. · Your doctor may recommend over-the-counter medicine. For mild or occasional indigestion, antacids, such as Tums, Gaviscon, Mylanta, or Maalox, may help.  Your doctor also may recommend over-the-counter acid reducers, such as Pepcid AC, Tagamet HB, Zantac 75, or Prilosec. Read and follow all instructions on the label. If you use these medicines often, talk with your doctor. · Change your eating habits. ? It's best to eat several small meals instead of two or three large meals. ? After you eat, wait 2 to 3 hours before you lie down. ? Chocolate, mint, and alcohol can make GERD worse. ? Spicy foods, foods that have a lot of acid (like tomatoes and oranges), and coffee can make GERD symptoms worse in some people. If your symptoms are worse after you eat a certain food, you may want to stop eating that food to see if your symptoms get better. · Do not smoke or chew tobacco. Smoking can make GERD worse. If you need help quitting, talk to your doctor about stop-smoking programs and medicines. These can increase your chances of quitting for good. · If you have GERD symptoms at night, raise the head of your bed 6 to 8 inches by putting the frame on blocks or placing a foam wedge under the head of your mattress. (Adding extra pillows does not work.)  · Do not wear tight clothing around your middle. · Lose weight if you need to. Losing just 5 to 10 pounds can help. When should you call for help? Call your doctor now or seek immediate medical care if:    · You have new or different belly pain.     · Your stools are black and tarlike or have streaks of blood.    Watch closely for changes in your health, and be sure to contact your doctor if:    · Your symptoms have not improved after 2 days.     · Food seems to catch in your throat or chest.   Where can you learn more? Go to http://etelvina-olivier.info/  Enter I265 in the search box to learn more about \"Gastroesophageal Reflux Disease (GERD): Care Instructions. \"  Current as of: August 11, 2019Content Version: 12.4  © 6857-6312 Healthwise, Incorporated.   Care instructions adapted under license by [employer] (which disclaims liability or warranty for this information). If you have questions about a medical condition or this instruction, always ask your healthcare professional. [address] any warranty or liability for your use of this information. High Blood Pressure: Care Instructions  Overview    It's normal for blood pressure to go up and down throughout the day. But if it stays up, you have high blood pressure. Another name for high blood pressure is hypertension. Despite what a lot of people think, high blood pressure usually doesn't cause headaches or make you feel dizzy or lightheaded. It usually has no symptoms. But it does increase your risk of stroke, heart attack, and other problems. You and your doctor will talk about your risks of these problems based on your blood pressure. Your doctor will give you a goal for your blood pressure. Your goal will be based on your health and your age. Lifestyle changes, such as eating healthy and being active, are always important to help lower blood pressure. You might also take medicine to reach your blood pressure goal.  Follow-up care is a key part of your treatment and safety. Be sure to make and go to all appointments, and call your doctor if you are having problems. It's also a good idea to know your test results and keep a list of the medicines you take. How can you care for yourself at home? Medical treatment  · If you stop taking your medicine, your blood pressure will go back up. You may take one or more types of medicine to lower your blood pressure. Be safe with medicines. Take your medicine exactly as prescribed. Call your doctor if you think you are having a problem with your medicine. · Talk to your doctor before you start taking aspirin every day. Aspirin can help certain people lower their risk of a heart attack or stroke. But taking aspirin isn't right for everyone, because it can cause serious bleeding.   · See your doctor regularly. You may need to see the doctor more often at first or until your blood pressure comes down. · If you are taking blood pressure medicine, talk to your doctor before you take decongestants or anti-inflammatory medicine, such as ibuprofen. Some of these medicines can raise blood pressure. · Learn how to check your blood pressure at home. Lifestyle changes  · Stay at a healthy weight. This is especially important if you put on weight around the waist. Losing even 10 pounds can help you lower your blood pressure. · If your doctor recommends it, get more exercise. Walking is a good choice. Bit by bit, increase the amount you walk every day. Try for at least 30 minutes on most days of the week. You also may want to swim, bike, or do other activities. · Avoid or limit alcohol. Talk to your doctor about whether you can drink any alcohol. · Try to limit how much sodium you eat to less than 2,300 milligrams (mg) a day. Your doctor may ask you to try to eat less than 1,500 mg a day. · Eat plenty of fruits (such as bananas and oranges), vegetables, legumes, whole grains, and low-fat dairy products. · Lower the amount of saturated fat in your diet. Saturated fat is found in animal products such as milk, cheese, and meat. Limiting these foods may help you lose weight and also lower your risk for heart disease. · Do not smoke. Smoking increases your risk for heart attack and stroke. If you need help quitting, talk to your doctor about stop-smoking programs and medicines. These can increase your chances of quitting for good. When should you call for help? Call  911 anytime you think you may need emergency care. This may mean having symptoms that suggest that your blood pressure is causing a serious heart or blood vessel problem. Your blood pressure may be over 180/120.   For example, call  911 if:    · You have symptoms of a heart attack. These may include:  ?  Chest pain or pressure, or a strange feeling in the chest.  ? Sweating. ? Shortness of breath. ? Nausea or vomiting. ? Pain, pressure, or a strange feeling in the back, neck, jaw, or upper belly or in one or both shoulders or arms. ? Lightheadedness or sudden weakness. ? A fast or irregular heartbeat.     · You have symptoms of a stroke. These may include:  ? Sudden numbness, tingling, weakness, or loss of movement in your face, arm, or leg, especially on only one side of your body. ? Sudden vision changes. ? Sudden trouble speaking. ? Sudden confusion or trouble understanding simple statements. ? Sudden problems with walking or balance. ? A sudden, severe headache that is different from past headaches.     · You have severe back or belly pain.    Do not wait until your blood pressure comes down on its own. Get help right away.   Call your doctor now or seek immediate care if:    · Your blood pressure is much higher than normal (such as 180/120 or higher), but you don't have symptoms.     · You think high blood pressure is causing symptoms, such as:  ? Severe headache.  ? Blurry vision.    Watch closely for changes in your health, and be sure to contact your doctor if:    · Your blood pressure measures higher than your doctor recommends at least 2 times. That means the top number is higher or the bottom number is higher, or both.     · You think you may be having side effects from your blood pressure medicine. Where can you learn more? Go to http://etelvina-olivier.info/  Enter [id number] in the search box to learn more about \"High Blood Pressure: Care Instructions. \"  Current as of: December 15, 2019Content Version: 12.4  © 1650-6094 Healthwise, Incorporated. Care instructions adapted under license by [email or web address] (which disclaims liability or warranty for this information).  If you have questions about a medical condition or this instruction, always ask your healthcare professional. Meghan Saenz disclaims any warranty or liability for your use of this information. Patient Education        Chronic Obstructive Pulmonary Disease (COPD): Care Instructions  Your Care Instructions    Chronic obstructive pulmonary disease (COPD) is a general term for a group of lung diseases, including emphysema and chronic bronchitis. People with COPD have decreased airflow in and out of the lungs, which makes it hard to breathe. The airways also can get clogged with thick mucus. Cigarette smoking is a major cause of COPD. Although there is no cure for COPD, you can slow its progress. Following your treatment plan and taking care of yourself can help you feel better and live longer. Follow-up care is a key part of your treatment and safety. Be sure to make and go to all appointments, and call your doctor if you are having problems. It's also a good idea to know your test results and keep a list of the medicines you take. How can you care for yourself at home?   Staying healthy    · Do not smoke. This is the most important step you can take to prevent more damage to your lungs. If you need help quitting, talk to your doctor about stop-smoking programs and medicines. These can increase your chances of quitting for good.     · Avoid colds and flu. Get a pneumococcal vaccine shot. If you have had one before, ask your doctor whether you need a second dose. Get the flu vaccine every fall. If you must be around people with colds or the flu, wash your hands often.     · Avoid secondhand smoke, air pollution, and high altitudes. Also avoid cold, dry air and hot, humid air. Stay at home with your windows closed when air pollution is bad.    Medicines and oxygen therapy    · Take your medicines exactly as prescribed. Call your doctor if you think you are having a problem with your medicine.     · You may be taking medicines such as:  ? Bronchodilators. These help open your airways and make breathing easier.  Bronchodilators are either short-acting (work for 6 to 9 hours) or long-acting (work for 24 hours). You inhale most bronchodilators, so they start to act quickly. Always carry your quick-relief inhaler with you in case you need it while you are away from home. ? Corticosteroids (prednisone, budesonide). These reduce airway inflammation. They come in pill or inhaled form. You must take these medicines every day for them to work well.     · A spacer may help you get more inhaled medicine to your lungs. Ask your doctor or pharmacist if a spacer is right for you. If it is, ask how to use it properly.     · Do not take any vitamins, over-the-counter medicine, or herbal products without talking to your doctor first.     · If your doctor prescribed antibiotics, take them as directed. Do not stop taking them just because you feel better. You need to take the full course of antibiotics.     · Oxygen therapy boosts the amount of oxygen in your blood and helps you breathe easier. Use the flow rate your doctor has recommended, and do not change it without talking to your doctor first.   Activity    · Get regular exercise. Walking is an easy way to get exercise. Start out slowly, and walk a little more each day.     · Pay attention to your breathing. You are exercising too hard if you cannot talk while you are exercising.     · Take short rest breaks when doing household chores and other activities.     · Learn breathing methods--such as breathing through pursed lips--to help you become less short of breath.     · If your doctor has not set you up with a pulmonary rehabilitation program, talk to him or her about whether rehab is right for you. Rehab includes exercise programs, education about your disease and how to manage it, help with diet and other changes, and emotional support. Diet    · Eat regular, healthy meals. Use bronchodilators about 1 hour before you eat to make it easier to eat. Eat several small meals instead of three large ones.  Drink beverages at the end of the meal. Avoid foods that are hard to chew.     · Eat foods that contain protein so that you do not lose muscle mass.     · Talk with your doctor if you gain too much weight or if you lose weight without trying.    Mental health    · Talk to your family, friends, or a therapist about your feelings. It is normal to feel frightened, angry, hopeless, helpless, and even guilty. Talking openly about bad feelings can help you cope. If these feelings last, talk to your doctor. When should you call for help? Call 911 anytime you think you may need emergency care. For example, call if:    · You have severe trouble breathing.    Call your doctor now or seek immediate medical care if:    · You have new or worse trouble breathing.     · You cough up blood.     · You have a fever.    Watch closely for changes in your health, and be sure to contact your doctor if:    · You cough more deeply or more often, especially if you notice more mucus or a change in the color of your mucus.     · You have new or worse swelling in your legs or belly.     · You are not getting better as expected. Where can you learn more? Go to http://etelvina-olivier.info/  Enter D238 in the search box to learn more about \"Chronic Obstructive Pulmonary Disease (COPD): Care Instructions. \"  Current as of: June 9, 2019Content Version: 12.4  © 4896-3750 Healthwise, Incorporated. Care instructions adapted under license by [employer] (which disclaims liability or warranty for this information). If you have questions about a medical condition or this instruction, always ask your healthcare professional. [place] [address] any warranty or liability for your use of this information. Patient Education        COPD Exacerbation Plan: Care Instructions  Your Care Instructions    If you have chronic obstructive pulmonary disease (COPD), your usual shortness of breath could suddenly get worse.  You may start coughing more and have more mucus. This flare-up is called a COPD exacerbation (say \"[id number]\"). A lung infection or air pollution could set off an exacerbation. Sometimes it can happen after a quick change in temperature or being around chemicals. Work with your doctor to make a plan for dealing with an exacerbation. You can better manage it if you plan ahead. Follow-up care is a key part of your treatment and safety. Be sure to make and go to all appointments, and call your doctor if you are having problems. It's also a good idea to know your test results and keep a list of the medicines you take. How can you care for yourself at home? During an exacerbation  · Do not panic if you start to have one. Quick treatment at home may help you prevent serious breathing problems. If you have a COPD exacerbation plan that you developed with your doctor, follow it. · Take your medicines exactly as your doctor tells you.  ? Use your inhaler as directed by your doctor. If your symptoms do not get better after you use your medicine, have someone take you to the emergency room. Call an ambulance if necessary. ? With inhaled medicines, a spacer or a nebulizer may help you get more medicine to your lungs. Ask your doctor or pharmacist how to use them properly. Practice using the spacer in front of a mirror before you have an exacerbation. This may help you get the medicine into your lungs quickly. ? If your doctor has given you steroid pills, take them as directed. ? Your doctor may have given you a prescription for antibiotics, which you can fill if you need it. ? Talk to your doctor if you have any problems with your medicine. And call your doctor if you have to use your antibiotic or steroid pills. Preventing an exacerbation  · Do not smoke. This is the most important step you can take to prevent more damage to your lungs and prevent problems. If you already smoke, it is never too late to stop.  If you need help quitting, talk to your doctor about stop-smoking programs and medicines. These can increase your chances of quitting for good. · Take your daily medicines as prescribed. · Avoid colds and flu. ? Get a pneumococcal vaccine. ? Get a flu vaccine each year, as soon as it is available. Ask those you live or work with to do the same, so they will not get the flu and infect you. ? Try to stay away from people with colds or the flu. ? Wash your hands often. · Avoid secondhand smoke; air pollution; cold, dry air; hot, humid air; and high altitudes. Stay at home with your windows closed when air pollution is bad. · Learn breathing techniques for COPD, such as breathing through pursed lips. These techniques can help you breathe easier during an exacerbation. When should you call for help? Call 911 anytime you think you may need emergency care. For example, call if:    · You have severe trouble breathing.     · You have severe chest pain.    Call your doctor now or seek immediate medical care if:    · You have new or worse shortness of breath.     · You develop new chest pain.     · You are coughing more deeply or more often, especially if you notice more mucus or a change in the color of your mucus.     · You cough up blood.     · You have new or increased swelling in your legs or belly.     · You have a fever.    Watch closely for changes in your health, and be sure to contact your doctor if:    · You need to use your antibiotic or steroid pills.     · Your symptoms are getting worse. Where can you learn more? Go to http://etelvina-olivier.info/  Enter U536 in the search box to learn more about \"COPD Exacerbation Plan: Care Instructions. \"  Current as of: June 9, 2019Content Version: 12.4  © 3110-4226 Healthwise, Incorporated. Care instructions adapted under license by [employer] (which disclaims liability or warranty for this information).  If you have questions about a medical condition or this instruction, always ask your healthcare professional. [place] [address] any warranty or liability for your use of this information. Your A1C  was   Lab Results   Component Value Date/Time    Hemoglobin A1c 8.2 (H) 03/05/2020 05:41 AM   .      This lab test reflects that your blood sugar averaged   over the past 3 months. It is important to follow up with your provider on a routine basis to continue to evaluate your blood sugar discuss any necessary changes in treatment.

## 2020-04-11 NOTE — PROGRESS NOTES
Problem: Diabetes Maintenance:Admission  Goal: *Blood glucose 80 to 180 mg/dl  Outcome: Progressing Towards Goal     Problem: Falls - Risk of  Goal: *Absence of Falls  Description: Document Ricarda Fall Risk and appropriate interventions in the flowsheet. Outcome: Progressing Towards Goal  Note: Fall Risk Interventions:            Medication Interventions: Patient to call before getting OOB                   Problem: Patient Education: Go to Patient Education Activity  Goal: Patient/Family Education  Outcome: Progressing Towards Goal     Problem: Chronic Obstructive Pulmonary Disease (COPD)  Goal: *Oxygen saturation during activity within specified parameters  Outcome: Progressing Towards Goal  Goal: *Able to remain out of bed as prescribed  Outcome: Progressing Towards Goal  Goal: *Absence of hypoxia  Outcome: Progressing Towards Goal  Goal: *Optimize nutritional status  Outcome: Progressing Towards Goal     Problem: Patient Education: Go to Patient Education Activity  Goal: Patient/Family Education  Outcome: Progressing Towards Goal     Problem: Diabetes Self-Management  Goal: *Disease process and treatment process  Description: Define diabetes and identify own type of diabetes; list 3 options for treating diabetes. Outcome: Progressing Towards Goal  Goal: *Incorporating nutritional management into lifestyle  Description: Describe effect of type, amount and timing of food on blood glucose; list 3 methods for planning meals. Outcome: Progressing Towards Goal  Goal: *Incorporating physical activity into lifestyle  Description: State effect of exercise on blood glucose levels. Outcome: Progressing Towards Goal  Goal: *Developing strategies to promote health/change behavior  Description: Define the ABC's of diabetes; identify appropriate screenings, schedule and personal plan for screenings.   Outcome: Progressing Towards Goal  Goal: *Using medications safely  Description: State effect of diabetes medications on diabetes; name diabetes medication taking, action and side effects. Outcome: Progressing Towards Goal  Goal: *Monitoring blood glucose, interpreting and using results  Description: Identify recommended blood glucose targets  and personal targets. Outcome: Progressing Towards Goal  Goal: *Prevention, detection, treatment of acute complications  Description: List symptoms of hyper- and hypoglycemia; describe how to treat low blood sugar and actions for lowering  high blood glucose level. Outcome: Progressing Towards Goal  Goal: *Prevention, detection and treatment of chronic complications  Description: Define the natural course of diabetes and describe the relationship of blood glucose levels to long term complications of diabetes.   Outcome: Progressing Towards Goal  Goal: *Developing strategies to address psychosocial issues  Description: Describe feelings about living with diabetes; identify support needed and support network  Outcome: Progressing Towards Goal  Goal: *Sick day guidelines  Outcome: Progressing Towards Goal  Goal: *Patient Specific Goal (EDIT GOAL, INSERT TEXT)  Outcome: Progressing Towards Goal     Problem: Patient Education: Go to Patient Education Activity  Goal: Patient/Family Education  Outcome: Progressing Towards Goal
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
## 2020-04-13 ENCOUNTER — PATIENT OUTREACH (OUTPATIENT)
Dept: CASE MANAGEMENT | Age: 60
End: 2020-04-13

## 2020-04-13 NOTE — PROGRESS NOTES
COVID-19 Screening Initial Follow-up Note    Patient contacted regarding COVID-19  risk. Care Transition Nurse/ Ambulatory Care Manager contacted the patient by telephone to perform post discharge assessment. Verified name and  with patient as identifiers. Provided introduction to self, and explanation of the CTN/ACM role, and reason for call due to risk factors for infection and/or exposure to COVID-19. Symptoms reviewed with patient who verbalized the following symptoms:    - No new or worsening symptoms      Due to no new or worsening symptoms encounter was not routed to provider for escalation. Patient has following risk factors of: COPD. CTN/ACM reviewed discharge instructions, medical action plan and red flags such as increased shortness of breath, increasing fever and signs of decompensation with patient who verbalized understanding. Discussed exposure protocols and quarantine with CDC Guidelines What to do if you are sick with coronavirus disease 2019 Patient who was given an opportunity for questions and concerns. The patient agrees to contact the [facility] exposure line [phone], [facility]  [phone] and PCP office for questions related to their healthcare. CTN/ACM provided contact information for future reference. Reviewed and educated patient on any new and changed medications related to discharge diagnosis.     Patient/family/caregiver given information for [employer] and agrees to enroll no    Plan for follow-up call in 7 to  14 days based on severity of symptoms and risk factors

## 2020-04-21 ENCOUNTER — PATIENT OUTREACH (OUTPATIENT)
Dept: CASE MANAGEMENT | Age: 60
End: 2020-04-21

## 2020-04-21 NOTE — PROGRESS NOTES
Follow UP     Care Transitions Nurse ( CTN) spoke with patient via telephone call. Patient related the following\"   - She has an appointment to see the surgeon tomorrow related to belly pain  - She has had a hernia for a long time and belly pain may be related to this. - Belly pain has been ongoing x approximately 3 weeks. Patient asked to please follow up at ED if pain is severe. Patient voiced understanding his information. Patient does not have any concerns or questions at this time.

## 2020-04-22 ENCOUNTER — OFFICE VISIT (OUTPATIENT)
Dept: SURGERY | Age: 60
End: 2020-04-22

## 2020-04-22 VITALS
DIASTOLIC BLOOD PRESSURE: 76 MMHG | HEART RATE: 68 BPM | TEMPERATURE: 97.7 F | WEIGHT: 258 LBS | SYSTOLIC BLOOD PRESSURE: 146 MMHG | HEIGHT: 63 IN | RESPIRATION RATE: 18 BRPM | OXYGEN SATURATION: 97 % | BODY MASS INDEX: 45.71 KG/M2

## 2020-04-22 DIAGNOSIS — K42.9 UMBILICAL HERNIA WITHOUT OBSTRUCTION AND WITHOUT GANGRENE: Primary | ICD-10-CM

## 2020-04-22 NOTE — PROGRESS NOTES
Chief Complaint   Patient presents with    Possible Hernia     Patient referred by Dr. Ainsley Hernandez for umbilical hernia.  Pt had XR ABD and CT A/P.

## 2020-04-23 ENCOUNTER — PATIENT OUTREACH (OUTPATIENT)
Dept: CASE MANAGEMENT | Age: 60
End: 2020-04-23

## 2020-04-23 NOTE — PROGRESS NOTES
Covid Care Transitions    Per Chart Review:     Patient attended follow up with Dr. Marce Salazar, General Surgery on 4-.

## 2020-04-24 NOTE — PROGRESS NOTES
[facility]  General Surgery    Subjective:      HPI: Patient is a very pleasant morbidly obese-BMI 45.70 kg/m² 43-year-old female with an extensive past medical history including hypertension, hyperlipidemia, history of tobacco abuse with COPD on oxygen 2 L, struct of sleep apnea, type 2 diabetes mellitus, history of chronic diastolic congestive heart failure and diverticulosis. She is referred by Dr. Heber Cardoso for evaluation and management umbilical hernia. I reviewed the CT of the abdomen pelvis independently on the monitor. I agree with the findings of umbilical hernia.   No evidence of obstruction    Patient Active Problem List    Diagnosis Date Noted    Atelectasis of right lung 12/14/2018    Acute exacerbation of chronic obstructive pulmonary disease (COPD) ([address] 10/07/2018    Bilateral carotid bruits 07/30/2018    Palpitations 07/30/2018    Type 2 diabetes with nephropathy ([address] 05/30/2018    Hyperlipidemia 01/24/2018    COPD with acute bronchitis ([address] 35/98/2325    Diastolic CHF, chronic ([address] 02/09/2017    Diverticulosis 02/09/2017    COPD exacerbation ([address] 02/08/2017    Acute exacerbation of COPD with asthma ([address] 02/08/2017    Obesity, Class III, BMI 40-49.9 (morbid obesity) ([address] 08/09/2016    Chest pain 08/09/2016    Dyspnea 08/09/2016    COPD with acute exacerbation ([address] 05/24/2015    COPD (chronic obstructive pulmonary disease) (HCC) 03/27/2015    Allergic rhinitis 03/27/2015    Essential hypertension, benign 09/30/2012    Diabetes mellitus, type 2 ([address] 09/30/2012    Esophageal reflux 09/30/2012    SHERRIE on CPAP      Past Medical History:   Diagnosis Date    Asthma     Chronic lung disease     COPD     Cystocele, midline     Diabetes mellitus ([address]     GERD (gastroesophageal reflux disease)     Hidradenitis suppurativa     Hyperlipidemia     Hypertension     SHERRIE on CPAP     CPAP    Stress incontinence       Past Surgical History:   Procedure Laterality Date    BREAST SURGERY PROCEDURE UNLISTED      Right breast benign tumor removal    HX APPENDECTOMY      HX CHOLECYSTECTOMY      HX DILATION AND CURETTAGE      Dysfunctional uterine bleeding, thought 2/2 fibroids    HX TUBAL LIGATION        Family History   Problem Relation Age of Onset    Hypertension Mother     Stroke Mother     Breast Cancer Mother         Bilateral mastectomies    Cancer Mother         ovarian and breast    Heart Failure Mother     Heart Attack Father         2011    Heart Surgery Father         CABG    Heart Failure Father     COPD Sister         Heavy smoker    Cancer Sister         ovarian    Heart Failure Sister     Lung Disease Sister     Asthma Child     Cancer Maternal Aunt         pancreatic    Cancer Maternal Grandfather         stomach      Social History     Tobacco Use    Smoking status: Former Smoker     Packs/day: 1.00     Years: 30.00     Pack years: 30.00     Types: Cigarettes     Start date: 1966     Last attempt to quit: 2006     Years since quittin.6    Smokeless tobacco: Never Used    Tobacco comment: 1-1.5 packs per day   Substance Use Topics    Alcohol use: No      Allergies   Allergen Reactions    Ancef [Cefazolin] Hives    Contrast Agent [Iodine] Anaphylaxis, Shortness of Breath and Swelling     Needs pre-medication for IV contrast with Benadryl, Solu-Medrol    Fish Containing Products Anaphylaxis     Pt states she had a severe allergic reaction at 9 y/o.  Statins-Hmg-Coa Reductase Inhibitors Myalgia    Metformin Other (comments)     Abdominal pain, diarrhea.  Codeine Other (comments)     Altered mental status       Prior to Admission medications    Medication Sig Start Date End Date Taking? Authorizing Provider   predniSONE (DELTASONE) 20 mg tablet Take 20 mg by mouth daily (with breakfast). Give with food.     Take 60mg (3 tabs) for 3 days  Then 40mg (2tabs) for next 3 days  Then 20mg (1tab) for next 3 days  Then 10mg (0.5 tab) for next 3 days  Then 10mg (0.5 tab) every other day  Indications: worsening chronic obstructive pulmonary disease  Indications: worsening chronic obstructive pulmonary disease 4/12/20  Yes Damien Hawkins MD   furosemide (Lasix) 20 mg tablet Take 20 mg by mouth every other day. Yes Provider, Historical   azithromycin (ZITHROMAX) 250 mg tablet Take 1 Tab by mouth daily. Monday-Friday. 4/2/20  Yes Iggy Washington MD   insulin glargine (LANTUS,BASAGLAR) 100 unit/mL (3 mL) inpn 40 Units by SubCUTAneous route nightly. Please inject 40 units every bedtime. Decrease to 35 units every bedtime after 7 days of 40 units. Indications: type 2 diabetes mellitus 4/2/20  Yes Iggy Washington MD   lactobacillus sp. 50 billion cpu (BIO-K PLUS) 50 billion cell -375 mg cap capsule Take 1 Cap by mouth daily. 3/10/20  Yes Iggy Washington MD   simethicone (GAS-X) 125 mg capsule Take 125 mg by mouth four (4) times daily as needed for Flatulence. Yes Provider, Historical   lisinopril (PRINIVIL, ZESTRIL) 20 mg tablet Take 20 mg by mouth two (2) times a day. Yes Provider, Historical   albuterol sulfate (PROVENTIL;VENTOLIN) 2.5 mg/0.5 mL nebu nebulizer solution 0.5 mL by Nebulization route four (4) times daily as needed for Wheezing. To be used with HyperSal nebulizer solution. ICD code: COPD J44.1 7/2/19  Yes Pauline Mahan MD   fluticasone propionate [facility] ALLERGY RELIEF) 50 mcg/actuation nasal spray 2 Sprays by Both Nostrils route daily. Yes Provider, Historical   fluticasone propion-salmeterol (ADVAIR HFA) 230-21 mcg/actuation inhaler Take 2 Puffs by inhalation two (2) times a day. Yes Provider, Historical   hydroCHLOROthiazide (HYDRODIURIL) 12.5 mg tablet Take 12.5 mg by mouth daily. Yes Provider, Historical   tiotropium bromide (SPIRIVA RESPIMAT) 2.5 mcg/actuation inhaler Take 2 Puffs by inhalation daily.    Yes Provider, Historical   albuterol sulfate 90 mcg/actuation aepb Take 1 Puff by inhalation every four (4) hours. Patient taking differently: Take 1 Puff by inhalation every four (4) hours as needed. 8/25/18  Yes Anika Fields MD   NOVOLOG FLEXPEN U-100 INSULIN 100 unit/mL inpn Continue home Sliding scale insulin as prior to admission  Patient taking differently: 1 Units by SubCUTAneous route three (3) times daily. If BG <100=0u  101-150=5u  151-250=8u  251-300=12u  >300 call MD   7/10/18  Yes Gumaro Dougherty MD   OXYGEN-AIR DELIVERY SYSTEMS 2 L by Nasal route continuous. [facility]   Yes Provider, Historical   aspirin delayed-release 81 mg tablet Take 81 mg by mouth daily. Yes Provider, Historical   montelukast (SINGULAIR) 10 mg tablet Take 10 mg by mouth nightly. Yes Other, MD Lauren   predniSONE (DELTASONE) 20 mg tablet Take 60mg(3 tabs) daily for 3 days, then 40mg(2 tabs) daily for 3 days, then 20mg(1 tab) daily for 3 days, 10mg (1/2 tab) daily for 3 days. Then resume 10mg tab every other day. 4/2/20   Ed Feng MD   loratadine (CLARITIN) 10 mg tablet Take 10 mg by mouth daily as needed for Allergies. Provider, Historical       Review of Systems:    14 systems were reviewed. The results are as above in the HPI and otherwise negative. Objective:     Vitals:    04/22/20 1412   BP: 146/76   Pulse: 68   Resp: 18   Temp: 97.7 °F (36.5 °C)   SpO2: 97%   Weight: 117 kg (258 lb)   Height: 5' 3\" (1.6 m)       Physical Exam:  GENERAL: alert, cooperative, no distress, appears stated age,   EYE: conjunctivae/corneas clear. PERRL, EOM's intact. THROAT & NECK: normal and no erythema or exudates noted. ,    LYMPHATIC: Cervical, supraclavicular, and axillary nodes normal. ,   LUNG: clear to auscultation bilaterally,   HEART: regular rate and rhythm, S1, S2 normal, no murmur, click, rub or gallop,   ABDOMEN: soft, morbidly obese, nondistended, non-tender at reducible umbilical hernia.  Bowel sounds normal. No masses,  no organomegaly,   EXTREMITIES:  extremities normal, atraumatic, no cyanosis or edema,   SKIN: Normal.,   NEUROLOGIC: AOx3. Cranial nerves 2-12 and sensation grossly intact. ,     Data Review:  to be done    Ms. Duy Capps has a reminder for a \"due or due soon\" health maintenance. I have asked that she contact her primary care provider for follow-up on this health maintenance. Impression:     · Patient with multiple comorbidities which make her an increased risk for surgery including risk of heart attack with diabetes and hypertension and risk of ventilator dependent respiratory failure with COPD which is O2 dependent with sleep apnea. Plan:     · I had a long discussion with the patient regarding weight loss through keeping a food journal and moving to a whole food plant-based diet with minimal whole food animal products. She should refrain from eating processed food. · Patient will follow-up with us in 2 months when his COVID-19 pandemic may have temporarily resolved itself. · We will discuss definitive management based on her weight loss at that time. We discussed the risk of increased recurrence in morbidly obese patients.     Signed By: Mckenzie Ham MD     April 24, 2020

## 2020-04-24 NOTE — PATIENT INSTRUCTIONS
Body Mass Index: Care Instructions  Your Care Instructions    Body mass index (BMI) can help you see if your weight is raising your risk for health problems. It uses a formula to compare how much you weigh with how tall you are. · A BMI lower than 18.5 is considered underweight. · A BMI between 18.5 and 24.9 is considered healthy. · A BMI between 25 and 29.9 is considered overweight. A BMI of 30 or higher is considered obese. If your BMI is in the normal range, it means that you have a lower risk for weight-related health problems. If your BMI is in the overweight or obese range, you may be at increased risk for weight-related health problems, such as high blood pressure, heart disease, stroke, arthritis or joint pain, and diabetes. If your BMI is in the underweight range, you may be at increased risk for health problems such as fatigue, lower protection (immunity) against illness, muscle loss, bone loss, hair loss, and hormone problems. BMI is just one measure of your risk for weight-related health problems. You may be at higher risk for health problems if you are not active, you eat an unhealthy diet, or you drink too much alcohol or use tobacco products. Follow-up care is a key part of your treatment and safety. Be sure to make and go to all appointments, and call your doctor if you are having problems. It's also a good idea to know your test results and keep a list of the medicines you take. How can you care for yourself at home? · Practice healthy eating habits. This includes eating plenty of fruits, vegetables, whole grains, lean protein, and low-fat dairy. · If your doctor recommends it, get more exercise. Walking is a good choice. Bit by bit, increase the amount you walk every day. Try for at least 30 minutes on most days of the week. · Do not smoke. Smoking can increase your risk for health problems. If you need help quitting, talk to your doctor about stop-smoking programs and medicines. These can increase your chances of quitting for good. · Limit alcohol to 2 drinks a day for men and 1 drink a day for women. Too much alcohol can cause health problems. If you have a BMI higher than 25  · Your doctor may do other tests to check your risk for weight-related health problems. This may include measuring the distance around your waist. A waist measurement of more than 40 inches in men or 35 inches in women can increase the risk of weight-related health problems. · Talk with your doctor about steps you can take to stay healthy or improve your health. You may need to make lifestyle changes to lose weight and stay healthy, such as changing your diet and getting regular exercise. If you have a BMI lower than 18.5  · Your doctor may do other tests to check your risk for health problems. · Talk with your doctor about steps you can take to stay healthy or improve your health. You may need to make lifestyle changes to gain or maintain weight and stay healthy, such as getting more healthy foods in your diet and doing exercises to build muscle. Where can you learn more? Go to http://etelvina-olivier.info/  Enter S176 in the search box to learn more about \"Body Mass Index: Care Instructions. \"  Current as of: December 10, 2019Content Version: 12.4  © 9484-7195 Healthwise, Incorporated. Care instructions adapted under license by [employer] (which disclaims liability or warranty for this information). If you have questions about a medical condition or this instruction, always ask your healthcare professional. [address] any warranty or liability for your use of this information. Abdominal Hernia Repair: Before Your Surgery  What is abdominal hernia repair surgery? An abdominal hernia repair is a type of surgery. It fixes a problem called a hernia. A hernia is a bulge under the skin in your belly.  It happens when you have a weak spot in your belly muscles and a piece of your intestines or tissues pokes through your muscles. This can cause pain. You may notice the pain most when you lift something heavy. You can have a hernia near your belly button. Or it may be in a scar from an earlier surgery. To fix it, the doctor will do one of two kinds of surgery. In open surgery, the doctor makes one cut near the hernia. This cut is called an incision. In laparoscopic surgery, the doctor makes several very small incisions and uses a thin, lighted scope and small tools. In either type of surgery, the doctor pushes the bulge back in place, if needed. Then the doctor sews the healthy tissue back together. Often the doctor patches the weak spot with a piece of material.  Laparoscopic surgery leaves several small scars. Open surgery leaves one long scar. The scars fade with time. You will probably need to take 1 to 2 weeks off from work. But if your job requires heavy lifting or other physical work, you may need to take 4 to 6 weeks off. Follow-up care is a key part of your treatment and safety. Be sure to make and go to all appointments, and call your doctor if you are having problems. It's also a good idea to know your test results and keep a list of the medicines you take. How do you prepare for the surgery?   Surgery can be stressful. This information will help you understand what you can expect. And it will help you safely prepare for surgery. Preparing for surgery    · Be sure you have someone to take you home. Anesthesia and pain medicine will make it unsafe for you to drive or get home on your own.     · Understand exactly what surgery is planned, along with the risks, benefits, and other options.     · Tell your doctor ALL the medicines, vitamins, supplements, and herbal remedies you take. Some may increase the risk of problems during your surgery.  Your doctor will tell you if you should stop taking any of them before the surgery and how soon to do it.     · If you take aspirin or some other blood thinner, ask your doctor if you should stop taking it before your surgery. Make sure that you understand exactly what your doctor wants you to do. These medicines increase the risk of bleeding.     · Make sure your doctor and the hospital have a copy of your advance directive. If you don't have one, you may want to prepare one. It lets others know your health care wishes. It's a good thing to have before any type of surgery or procedure. .   What happens on the day of surgery? · Follow the instructions exactly about when to stop eating and drinking. If you don't, your surgery may be canceled. If your doctor told you to take your medicines on the day of surgery, take them with only a sip of water.     · Take a bath or shower before you come in for your surgery. Do not apply lotions, perfumes, deodorants, or nail polish.     · Do not shave the surgical site yourself.     · Take off all jewelry and piercings. And take out contact lenses, if you wear them.    At the hospital or surgery center   · Bring a picture ID.     · The area for surgery is often marked to make sure there are no errors.     · You will be kept comfortable and safe by your anesthesia provider. You will be asleep during the surgery.     · The surgery will take 30 minutes to 2 hours. It depends on how large the hernia is and where it is. When should you call your doctor? · You have questions or concerns.     · You don't understand how to prepare for your surgery.     · You become ill before the surgery (such as fever, flu, or a cold).     · You need to reschedule or have changed your mind about having the surgery. Where can you learn more? Go to http://etelvina-olivier.info/  Enter U288 in the search box to learn more about \"Abdominal Hernia Repair: Before Your Surgery. \"  Current as of: August 11, 2019Content Version: 12.4  © 2657-5638 Healthwise, Incorporated.   Care instructions adapted under license by [employer] (which disclaims liability or warranty for this information). If you have questions about a medical condition or this instruction, always ask your healthcare professional. [address] any warranty or liability for your use of this information.

## 2020-04-28 ENCOUNTER — PATIENT OUTREACH (OUTPATIENT)
Dept: CASE MANAGEMENT | Age: 60
End: 2020-04-28

## 2020-04-28 NOTE — PROGRESS NOTES
Patient resolved from Transition of Care episode on  4-. Patient/family has been provided the following resources and education related to COVID-19:                         Signs, symptoms and red flags related to COVID-19            CDC exposure and quarantine guidelines            [employer] exposure contact - [phone]            Contact for their local Department of Health                 Patient currently reports   - She is doing \"so much better\"   - She kept appointment with surgeon. Patient has no concerns or questions at this time. No further outreach scheduled with this CTN/ACM. Episode of Care resolved. Patient has this CTN/ACM contact information if future needs arise.

## 2020-05-13 ENCOUNTER — HOSPITAL ENCOUNTER (EMERGENCY)
Age: 60
Discharge: HOME | End: 2020-05-14
Attending: EMERGENCY MEDICINE
Payer: MEDICARE

## 2020-05-13 ENCOUNTER — APPOINTMENT (OUTPATIENT)
Dept: GENERAL RADIOLOGY | Age: 60
End: 2020-05-13
Attending: EMERGENCY MEDICINE
Payer: MEDICARE

## 2020-05-13 VITALS
SYSTOLIC BLOOD PRESSURE: 139 MMHG | OXYGEN SATURATION: 98 % | HEART RATE: 85 BPM | BODY MASS INDEX: 44.99 KG/M2 | RESPIRATION RATE: 27 BRPM | TEMPERATURE: 97.9 F | DIASTOLIC BLOOD PRESSURE: 58 MMHG | WEIGHT: 254 LBS

## 2020-05-13 DIAGNOSIS — G47.33 OSA ON CPAP: Chronic | ICD-10-CM

## 2020-05-13 DIAGNOSIS — I10 ESSENTIAL HYPERTENSION, BENIGN: Chronic | ICD-10-CM

## 2020-05-13 DIAGNOSIS — Z20.822 SUSPECTED COVID-19 VIRUS INFECTION: ICD-10-CM

## 2020-05-13 DIAGNOSIS — Z99.89 OSA ON CPAP: Chronic | ICD-10-CM

## 2020-05-13 DIAGNOSIS — E11.21 TYPE 2 DIABETES WITH NEPHROPATHY (HCC): ICD-10-CM

## 2020-05-13 DIAGNOSIS — J44.1 ACUTE EXACERBATION OF CHRONIC OBSTRUCTIVE PULMONARY DISEASE (COPD) (HCC): Primary | ICD-10-CM

## 2020-05-13 LAB
ANION GAP SERPL CALC-SCNC: 8 MMOL/L (ref 3–18)
APTT PPP: 24.5 SEC (ref 23–36.4)
BASOPHILS # BLD: 0 K/UL (ref 0–0.1)
BASOPHILS NFR BLD: 0 % (ref 0–2)
BNP SERPL-MCNC: 34 PG/ML (ref 0–900)
BUN SERPL-MCNC: 21 MG/DL (ref 7–18)
BUN/CREAT SERPL: 20 (ref 12–20)
CALCIUM SERPL-MCNC: 10 MG/DL (ref 8.5–10.1)
CHLORIDE SERPL-SCNC: 102 MMOL/L (ref 100–111)
CK MB CFR SERPL CALC: 1.3 % (ref 0–4)
CK MB SERPL-MCNC: 1 NG/ML (ref 5–25)
CK SERPL-CCNC: 79 U/L (ref 26–192)
CO2 SERPL-SCNC: 27 MMOL/L (ref 21–32)
CREAT SERPL-MCNC: 1.06 MG/DL (ref 0.6–1.3)
CRP SERPL-MCNC: 0.8 MG/DL (ref 0–0.3)
D DIMER PPP FEU-MCNC: <0.27 UG/ML(FEU)
DIFFERENTIAL METHOD BLD: NORMAL
EOSINOPHIL # BLD: 0.1 K/UL (ref 0–0.4)
EOSINOPHIL NFR BLD: 1 % (ref 0–5)
ERYTHROCYTE [DISTWIDTH] IN BLOOD BY AUTOMATED COUNT: 14.2 % (ref 11.6–14.5)
FERRITIN SERPL-MCNC: 33 NG/ML (ref 8–388)
FIBRINOGEN PPP-MCNC: 368 MG/DL (ref 210–451)
GLUCOSE BLD STRIP.AUTO-MCNC: 171 MG/DL (ref 70–110)
GLUCOSE SERPL-MCNC: 183 MG/DL (ref 74–99)
HCT VFR BLD AUTO: 39.3 % (ref 35–45)
HGB BLD-MCNC: 13.6 G/DL (ref 12–16)
INR PPP: 1 (ref 0.8–1.2)
LACTATE SERPL-SCNC: 2.9 MMOL/L (ref 0.4–2)
LDH SERPL L TO P-CCNC: 215 U/L (ref 81–234)
LYMPHOCYTES # BLD: 2.6 K/UL (ref 0.9–3.6)
LYMPHOCYTES NFR BLD: 33 % (ref 21–52)
MAGNESIUM SERPL-MCNC: 1.7 MG/DL (ref 1.6–2.6)
MCH RBC QN AUTO: 30.7 PG (ref 24–34)
MCHC RBC AUTO-ENTMCNC: 34.6 G/DL (ref 31–37)
MCV RBC AUTO: 88.7 FL (ref 74–97)
MONOCYTES # BLD: 0.6 K/UL (ref 0.05–1.2)
MONOCYTES NFR BLD: 7 % (ref 3–10)
NEUTS SEG # BLD: 4.6 K/UL (ref 1.8–8)
NEUTS SEG NFR BLD: 59 % (ref 40–73)
PLATELET # BLD AUTO: 291 K/UL (ref 135–420)
PMV BLD AUTO: 9.3 FL (ref 9.2–11.8)
POTASSIUM SERPL-SCNC: 3.5 MMOL/L (ref 3.5–5.5)
PROCALCITONIN SERPL-MCNC: <0.05 NG/ML
PROTHROMBIN TIME: 13.3 SEC (ref 11.5–15.2)
RBC # BLD AUTO: 4.43 M/UL (ref 4.2–5.3)
SODIUM SERPL-SCNC: 137 MMOL/L (ref 136–145)
TROPONIN I SERPL-MCNC: <0.02 NG/ML (ref 0–0.04)
WBC # BLD AUTO: 7.9 K/UL (ref 4.6–13.2)

## 2020-05-13 PROCEDURE — 80048 BASIC METABOLIC PNL TOTAL CA: CPT

## 2020-05-13 PROCEDURE — 87635 SARS-COV-2 COVID-19 AMP PRB: CPT

## 2020-05-13 PROCEDURE — 84145 PROCALCITONIN (PCT): CPT

## 2020-05-13 PROCEDURE — 82550 ASSAY OF CK (CPK): CPT

## 2020-05-13 PROCEDURE — 85379 FIBRIN DEGRADATION QUANT: CPT

## 2020-05-13 PROCEDURE — 96374 THER/PROPH/DIAG INJ IV PUSH: CPT

## 2020-05-13 PROCEDURE — 71045 X-RAY EXAM CHEST 1 VIEW: CPT

## 2020-05-13 PROCEDURE — 83880 ASSAY OF NATRIURETIC PEPTIDE: CPT

## 2020-05-13 PROCEDURE — 83735 ASSAY OF MAGNESIUM: CPT

## 2020-05-13 PROCEDURE — 74011250636 HC RX REV CODE- 250/636: Performed by: EMERGENCY MEDICINE

## 2020-05-13 PROCEDURE — 85025 COMPLETE CBC W/AUTO DIFF WBC: CPT

## 2020-05-13 PROCEDURE — 82962 GLUCOSE BLOOD TEST: CPT

## 2020-05-13 PROCEDURE — 74011250637 HC RX REV CODE- 250/637: Performed by: EMERGENCY MEDICINE

## 2020-05-13 PROCEDURE — 93005 ELECTROCARDIOGRAM TRACING: CPT

## 2020-05-13 PROCEDURE — 74011250636 HC RX REV CODE- 250/636: Performed by: STUDENT IN AN ORGANIZED HEALTH CARE EDUCATION/TRAINING PROGRAM

## 2020-05-13 PROCEDURE — 99285 EMERGENCY DEPT VISIT HI MDM: CPT

## 2020-05-13 PROCEDURE — 85730 THROMBOPLASTIN TIME PARTIAL: CPT

## 2020-05-13 PROCEDURE — 82728 ASSAY OF FERRITIN: CPT

## 2020-05-13 PROCEDURE — 86140 C-REACTIVE PROTEIN: CPT

## 2020-05-13 PROCEDURE — 74011000270 HC RX REV CODE- 270: Performed by: EMERGENCY MEDICINE

## 2020-05-13 PROCEDURE — 85384 FIBRINOGEN ACTIVITY: CPT

## 2020-05-13 PROCEDURE — 83605 ASSAY OF LACTIC ACID: CPT

## 2020-05-13 PROCEDURE — 83615 LACTATE (LD) (LDH) ENZYME: CPT

## 2020-05-13 PROCEDURE — 85610 PROTHROMBIN TIME: CPT

## 2020-05-13 RX ORDER — ALBUTEROL SULFATE 90 UG/1
2 AEROSOL, METERED RESPIRATORY (INHALATION)
Status: COMPLETED | OUTPATIENT
Start: 2020-05-13 | End: 2020-05-13

## 2020-05-13 RX ORDER — PREDNISONE 50 MG/1
50 TABLET ORAL DAILY
Qty: 3 TAB | Refills: 0 | Status: SHIPPED | OUTPATIENT
Start: 2020-05-13 | End: 2020-05-16

## 2020-05-13 RX ADMIN — ALBUTEROL SULFATE 2 PUFF: 90 AEROSOL, METERED RESPIRATORY (INHALATION) at 19:04

## 2020-05-13 RX ADMIN — SODIUM CHLORIDE, SODIUM LACTATE, POTASSIUM CHLORIDE, AND CALCIUM CHLORIDE 500 ML: 600; 310; 30; 20 INJECTION, SOLUTION INTRAVENOUS at 22:17

## 2020-05-13 RX ADMIN — METHYLPREDNISOLONE SODIUM SUCCINATE 125 MG: 125 INJECTION, POWDER, FOR SOLUTION INTRAMUSCULAR; INTRAVENOUS at 18:59

## 2020-05-13 RX ADMIN — Medication: at 22:06

## 2020-05-13 NOTE — ED TRIAGE NOTES
EMS reports SOB x 2 days; today: 3 neb tx without relief. O2 sat 96-97%; dry cough, nothing new.  In/ out of hospital for COPD since February.; Home O2 @  2L NC; currently on steroids

## 2020-05-13 NOTE — ED PROVIDER NOTES
EMERGENCY DEPARTMENT HISTORY AND PHYSICAL EXAM    5:38 PM      Date: 5/13/2020  Patient Name: Cierra Spivey    History of Presenting Illness     Chief Complaint   Patient presents with    Shortness of Breath         History Provided By: Patient  Location/Duration/Severity/Modifying factors   60year old female with hx of COPD, asthma, HTN, GERD, DM presents to the ED with chest tightness and shortness of breath. Patient states that symptoms began yesterday, initially mild with progressive worsening. She describes the chest tightness as band-like without radiation, no associated nausea/vomiting, lightheadedness/dizziness, diaphoresis. She states that her sx are consistent with her normal COPD exacerbations. She attempted home nebulizers x3 today without relief, which prompted her to seek care. She was transported to this facility by EMS without additional interventions in the field. PCP: Mariana Potter MD    Current Outpatient Medications   Medication Sig Dispense Refill    predniSONE (DELTASONE) 20 mg tablet Take 20 mg by mouth daily (with breakfast). Give with food. Take 60mg (3 tabs) for 3 days  Then 40mg (2tabs) for next 3 days  Then 20mg (1tab) for next 3 days  Then 10mg (0.5 tab) for next 3 days  Then 10mg (0.5 tab) every other day  Indications: worsening chronic obstructive pulmonary disease  Indications: worsening chronic obstructive pulmonary disease 30 Tab 0    furosemide (Lasix) 20 mg tablet Take 20 mg by mouth every other day.  azithromycin (ZITHROMAX) 250 mg tablet Take 1 Tab by mouth daily. Monday-Friday. 30 Tab 0    insulin glargine (LANTUS,BASAGLAR) 100 unit/mL (3 mL) inpn 40 Units by SubCUTAneous route nightly. Please inject 40 units every bedtime. Decrease to 35 units every bedtime after 7 days of 40 units.   Indications: type 2 diabetes mellitus 28 Units 0    predniSONE (DELTASONE) 20 mg tablet Take 60mg(3 tabs) daily for 3 days, then 40mg(2 tabs) daily for 3 days, then 20mg(1 tab) daily for 3 days, 10mg (1/2 tab) daily for 3 days. Then resume 10mg tab every other day. 20 Tab 0    lactobacillus sp. 50 billion cpu (BIO-K PLUS) 50 billion cell -375 mg cap capsule Take 1 Cap by mouth daily. 30 Cap 0    simethicone (GAS-X) 125 mg capsule Take 125 mg by mouth four (4) times daily as needed for Flatulence.  loratadine (CLARITIN) 10 mg tablet Take 10 mg by mouth daily as needed for Allergies.  lisinopril (PRINIVIL, ZESTRIL) 20 mg tablet Take 20 mg by mouth two (2) times a day.  albuterol sulfate (PROVENTIL;VENTOLIN) 2.5 mg/0.5 mL nebu nebulizer solution 0.5 mL by Nebulization route four (4) times daily as needed for Wheezing. To be used with HyperSal nebulizer solution. ICD code: COPD J44.1 60 mL 3    fluticasone propionate (FLONASE ALLERGY RELIEF) 50 mcg/actuation nasal spray 2 Sprays by Both Nostrils route daily.  fluticasone propion-salmeterol (ADVAIR HFA) 230-21 mcg/actuation inhaler Take 2 Puffs by inhalation two (2) times a day.  hydroCHLOROthiazide (HYDRODIURIL) 12.5 mg tablet Take 12.5 mg by mouth daily.  tiotropium bromide (SPIRIVA RESPIMAT) 2.5 mcg/actuation inhaler Take 2 Puffs by inhalation daily.  albuterol sulfate 90 mcg/actuation aepb Take 1 Puff by inhalation every four (4) hours. (Patient taking differently: Take 1 Puff by inhalation every four (4) hours as needed.) 1 Inhaler 0    NOVOLOG FLEXPEN U-100 INSULIN 100 unit/mL inpn Continue home Sliding scale insulin as prior to admission (Patient taking differently: 1 Units by SubCUTAneous route three (3) times daily. If BG <100=0u  101-150=5u  151-250=8u  251-300=12u  >300 call MD  ) 1 Pen 0    OXYGEN-AIR DELIVERY SYSTEMS 2 L by Nasal route continuous. First Choice      aspirin delayed-release 81 mg tablet Take 81 mg by mouth daily.  montelukast (SINGULAIR) 10 mg tablet Take 10 mg by mouth nightly.          Past History     Past Medical History:  Past Medical History:   Diagnosis Date    Asthma     Chronic lung disease     COPD     Cystocele, midline     Diabetes mellitus (HCC)     GERD (gastroesophageal reflux disease)     Hidradenitis suppurativa     Hyperlipidemia     Hypertension     SHERRIE on CPAP     CPAP    Stress incontinence        Past Surgical History:  Past Surgical History:   Procedure Laterality Date    BREAST SURGERY PROCEDURE UNLISTED      Right breast benign tumor removal    HX APPENDECTOMY      HX CHOLECYSTECTOMY      HX DILATION AND CURETTAGE      Dysfunctional uterine bleeding, thought 2/2 fibroids    HX TUBAL LIGATION         Family History:  Family History   Problem Relation Age of Onset    Hypertension Mother     Stroke Mother     Breast Cancer Mother         Bilateral mastectomies    Cancer Mother         ovarian and breast    Heart Failure Mother     Heart Attack Father         2011    Heart Surgery Father         CABG    Heart Failure Father     COPD Sister         Heavy smoker    Cancer Sister         ovarian    Heart Failure Sister     Lung Disease Sister     Asthma Child     Cancer Maternal Aunt         pancreatic    Cancer Maternal Grandfather         stomach       Social History:  Social History     Tobacco Use    Smoking status: Former Smoker     Packs/day: 1.00     Years: 30.00     Pack years: 30.00     Types: Cigarettes     Start date: 1966     Last attempt to quit: 2006     Years since quittin.6    Smokeless tobacco: Former User    Tobacco comment: 1-1.5 packs per day   Substance Use Topics    Alcohol use: No    Drug use: No       Allergies: Allergies   Allergen Reactions    Ancef [Cefazolin] Hives    Contrast Agent [Iodine] Anaphylaxis, Shortness of Breath and Swelling     Needs pre-medication for IV contrast with Benadryl, Solu-Medrol    Fish Containing Products Anaphylaxis     Pt states she had a severe allergic reaction at 7 y/o.     Statins-Hmg-Coa Reductase Inhibitors Myalgia    Metformin Other (comments)     Abdominal pain, diarrhea.  Codeine Other (comments)     Altered mental status         Review of Systems       Review of Systems   Constitutional: Negative for chills and fever. HENT: Negative for sore throat and trouble swallowing. Eyes: Negative for visual disturbance. Respiratory: Positive for cough (mild, non-productive), chest tightness, shortness of breath and wheezing. Cardiovascular: Negative for chest pain, palpitations and leg swelling. Gastrointestinal: Negative for abdominal pain, diarrhea, nausea and vomiting. Genitourinary: Negative for dysuria, flank pain and hematuria. Musculoskeletal: Negative for back pain and gait problem. Skin: Negative for rash. Neurological: Negative for dizziness, light-headedness and headaches. Psychiatric/Behavioral: Negative for confusion. Physical Exam     Visit Vitals  /77 (BP 1 Location: Right arm, BP Patient Position: At rest;Sitting)   Pulse 97   Temp 97.9 °F (36.6 °C)   Resp 25   Wt 115.2 kg (254 lb)   SpO2 99%   BMI 44.99 kg/m²         Physical Exam  Vitals signs and nursing note reviewed. Constitutional:       General: She is not in acute distress. Appearance: She is well-developed and normal weight. She is not diaphoretic. HENT:      Head: Normocephalic and atraumatic. Eyes:      Pupils: Pupils are equal, round, and reactive to light. Cardiovascular:      Rate and Rhythm: Normal rate and regular rhythm. Pulmonary:      Effort: Tachypnea and respiratory distress present. Breath sounds: No stridor. Wheezing (scattered expiratory wheezes) present. Chest:      Chest wall: No deformity or tenderness. Abdominal:      Palpations: Abdomen is soft. Tenderness: There is no abdominal tenderness. Musculoskeletal:      Right lower leg: She exhibits no tenderness. No edema. Left lower leg: She exhibits no tenderness. No edema. Skin:     General: Skin is warm and dry. Neurological:      General: No focal deficit present. Mental Status: She is alert and oriented to person, place, and time. Psychiatric:         Mood and Affect: Mood normal.         Behavior: Behavior normal.           Diagnostic Study Results     Labs -  No results found for this or any previous visit (from the past 12 hour(s)). Radiologic Studies -   No orders to display         Medical Decision Making   I am the first provider for this patient. I reviewed the vital signs, available nursing notes, past medical history, past surgical history, family history and social history. Vital Signs-Reviewed the patient's vital signs. EKG: normal sinus rhythm    Records Reviewed: Nursing Notes, Old Medical Records, Previous electrocardiograms, Previous Radiology Studies and Previous Laboratory Studies (Time of Review: 5:38 PM)    ED Course: Progress Notes, Reevaluation, and Consults:         Provider Notes (Medical Decision Making):   MDM  Number of Diagnoses or Management Options  Acute exacerbation of chronic obstructive pulmonary disease (COPD) ([facility] [address]):   Essential hypertension, benign:   SHERRIE on CPAP:   Suspected COVID-19 virus infection:   Type 2 diabetes with nephropathy [facility]):   Diagnosis management comments: I am concerned for COPD exacerbation vs COVID vs pneumonia vs asthma exacerbation. Lower suspicion for PE, ACS, pleural effusion, tamponade, pneumothorax. Will provide MDI medications for symptoms control as well as steroids. I will obtain labs, CXR, EKG at this time as well.    2009:  Labs without clinically significant abnormality. CXR without acute pathology. Patient reevaluated, reports symptomatic improvement. Vital signs appropriate at this time. At this point I suspect that this patient's symptoms were caused by a COPD exacerbation and is appropriate for discharge at this time. Plan to discharge with prednisone burst and PCP follow up.   Results and plan discussed with patient who is agreeable to plan. Return precautions and quarantine protocols discussed with patient, who verbalizes understanding and is agreeable to plan. Return precautions given. 2215:  Paged by lab with critical lactate 2.9. Will hold discharge, further resuscitate, ensure downtrending. Patient care turned over to Dr. Sonia Pereira. Procedures    Critical Care Time: 30      Diagnosis     Clinical Impression: No diagnosis found. Disposition: Pending at time of turnover, likely discharge    Follow-up Information    None          Patient's Medications   Start Taking    No medications on file   Continue Taking    ALBUTEROL SULFATE (PROVENTIL;VENTOLIN) 2.5 MG/0.5 ML NEBU NEBULIZER SOLUTION    0.5 mL by Nebulization route four (4) times daily as needed for Wheezing. To be used with HyperSal nebulizer solution. ICD code: COPD J44.1    ALBUTEROL SULFATE 90 MCG/ACTUATION AEPB    Take 1 Puff by inhalation every four (4) hours. ASPIRIN DELAYED-RELEASE 81 MG TABLET    Take 81 mg by mouth daily. AZITHROMYCIN (ZITHROMAX) 250 MG TABLET    Take 1 Tab by mouth daily. Monday-Friday. FLUTICASONE PROPION-SALMETEROL (ADVAIR HFA) 230-21 MCG/ACTUATION INHALER    Take 2 Puffs by inhalation two (2) times a day. FLUTICASONE PROPIONATE (FLONASE ALLERGY RELIEF) 50 MCG/ACTUATION NASAL SPRAY    2 Sprays by Both Nostrils route daily. FUROSEMIDE (LASIX) 20 MG TABLET    Take 20 mg by mouth every other day. HYDROCHLOROTHIAZIDE (HYDRODIURIL) 12.5 MG TABLET    Take 12.5 mg by mouth daily. INSULIN GLARGINE (LANTUS,BASAGLAR) 100 UNIT/ML (3 ML) INPN    40 Units by SubCUTAneous route nightly. Please inject 40 units every bedtime. Decrease to 35 units every bedtime after 7 days of 40 units. Indications: type 2 diabetes mellitus    LACTOBACILLUS SP. 50 BILLION CPU (BIO-K PLUS) 50 BILLION CELL -375 MG CAP CAPSULE    Take 1 Cap by mouth daily. LISINOPRIL (PRINIVIL, ZESTRIL) 20 MG TABLET    Take 20 mg by mouth two (2) times a day. LORATADINE (CLARITIN) 10 MG TABLET    Take 10 mg by mouth daily as needed for Allergies. MONTELUKAST (SINGULAIR) 10 MG TABLET    Take 10 mg by mouth nightly. NOVOLOG FLEXPEN U-100 INSULIN 100 UNIT/ML INPN    Continue home Sliding scale insulin as prior to admission    OXYGEN-AIR DELIVERY SYSTEMS    2 L by Nasal route continuous. First Choice    PREDNISONE (DELTASONE) 20 MG TABLET    Take 60mg(3 tabs) daily for 3 days, then 40mg(2 tabs) daily for 3 days, then 20mg(1 tab) daily for 3 days, 10mg (1/2 tab) daily for 3 days. Then resume 10mg tab every other day. PREDNISONE (DELTASONE) 20 MG TABLET    Take 20 mg by mouth daily (with breakfast). Give with food. Take 60mg (3 tabs) for 3 days  Then 40mg (2tabs) for next 3 days  Then 20mg (1tab) for next 3 days  Then 10mg (0.5 tab) for next 3 days  Then 10mg (0.5 tab) every other day  Indications: worsening chronic obstructive pulmonary disease  Indications: worsening chronic obstructive pulmonary disease    SIMETHICONE (GAS-X) 125 MG CAPSULE    Take 125 mg by mouth four (4) times daily as needed for Flatulence. TIOTROPIUM BROMIDE (SPIRIVA RESPIMAT) 2.5 MCG/ACTUATION INHALER    Take 2 Puffs by inhalation daily. These Medications have changed    No medications on file   Stop Taking    No medications on file     Disclaimer: Sections of this note are dictated using utilizing voice recognition software. Minor typographical errors may be present. If questions arise, please do not hesitate to contact me or call our department.

## 2020-05-14 ENCOUNTER — PATIENT OUTREACH (OUTPATIENT)
Dept: CASE MANAGEMENT | Age: 60
End: 2020-05-14

## 2020-05-14 LAB
ATRIAL RATE: 92 BPM
CALCULATED P AXIS, ECG09: 41 DEGREES
CALCULATED R AXIS, ECG10: 18 DEGREES
CALCULATED T AXIS, ECG11: 47 DEGREES
DIAGNOSIS, 93000: NORMAL
P-R INTERVAL, ECG05: 144 MS
Q-T INTERVAL, ECG07: 364 MS
QRS DURATION, ECG06: 88 MS
QTC CALCULATION (BEZET), ECG08: 450 MS
VENTRICULAR RATE, ECG03: 92 BPM

## 2020-05-14 NOTE — PROGRESS NOTES
5/14/2020 3:03 PM     CTN attempted to contact patient for COVID Risk. Patient admitted to [facility] on 4/12/2020 and discharged on 4/13/2020 for difficulty breathing/COPD, mild cough and suspected virus. Message left introducing myself, the purpose of the call and giving my contact information. Requested that patient call back at her earliest convenience.

## 2020-05-15 ENCOUNTER — PATIENT OUTREACH (OUTPATIENT)
Dept: CASE MANAGEMENT | Age: 60
End: 2020-05-15

## 2020-05-15 NOTE — PROGRESS NOTES
5/15/2020 3:03 PM     CTN attempted to contact patient for COVID Risk. Patient admitted to [facility] on 4/12/2020 and discharged on 4/13/2020 for difficulty breathing/COPD, mild cough and suspected virus. Message left introducing myself, the purpose of the call and giving my contact information. Requested that patient call back at her earliest convenience. Called emergency contact number and she gave me patient's number that I had already called. Emergency contact stated that she would have patient call me. This is second attempt to call patient to follow up with her. Will follow.

## 2020-05-17 LAB — SARS-COV-2, COV2NT: NOT DETECTED

## 2020-05-18 ENCOUNTER — PATIENT OUTREACH (OUTPATIENT)
Dept: CASE MANAGEMENT | Age: 60
End: 2020-05-18

## 2020-05-18 NOTE — PROGRESS NOTES
Patient contacted regarding recent discharge and COVID-19 risk   Care Transition Nurse/ Ambulatory Care Manager contacted the patient by telephone to perform post discharge assessment. Verified name and  with patient as identifiers. Patient has following risk factors of: COPD and asthma. CTN/ACM reviewed discharge instructions, medical action plan and red flags related to discharge diagnosis. Reviewed and educated them on any new and changed medications related to discharge diagnosis. Advised obtaining a 90-day supply of all daily and as-needed medications. Education provided regarding infection prevention, and signs and symptoms of COVID-19 and when to seek medical attention with patient who verbalized understanding. Discussed exposure protocols and quarantine from [address] you at higher risk for severe illness  and given an opportunity for questions and concerns. The patient agrees to contact the COVID-19 hotline [phone] or PCP office for questions related to their healthcare. CTN/ACM provided contact information for future reference. From CDC: Are you at higher risk for severe illness?  Wash your hands often.  Avoid close contact (6 feet, which is about two arm lengths) with people who are sick.  Put distance between yourself and other people if COVID-19 is spreading in your community.  Clean and disinfect frequently touched surfaces.  Avoid all cruise travel and non-essential air travel.  Call your healthcare professional if you have concerns about COVID-19 and your underlying condition or if you are sick.     For more information on steps you can take to protect yourself, see CDC's How to Protect Yourself      Patient/family/caregiver given information for Kathy Lamb and agrees to enroll no  Patient's preferred e-mail:  none  Patient's preferred phone number: [phone]  Based on Loop alert triggers, patient will be contacted by nurse care manager for worsening symptoms. Gave patient results of testing. She tested negative for the COVID. Patient was pleased. Patient appreciated the call to follow up with her. Will follow. Plan for follow-up call in 5-7 days based on severity of symptoms and risk factors.

## 2020-05-21 ENCOUNTER — APPOINTMENT (OUTPATIENT)
Dept: GENERAL RADIOLOGY | Age: 60
DRG: 189 | End: 2020-05-21
Attending: EMERGENCY MEDICINE
Payer: MEDICARE

## 2020-05-21 ENCOUNTER — HOSPITAL ENCOUNTER (INPATIENT)
Age: 60
LOS: 4 days | Discharge: HOME | DRG: 189 | End: 2020-05-26
Attending: EMERGENCY MEDICINE | Admitting: FAMILY MEDICINE
Payer: MEDICARE

## 2020-05-21 DIAGNOSIS — J44.1 ACUTE EXACERBATION OF CHRONIC OBSTRUCTIVE PULMONARY DISEASE (COPD) (HCC): Primary | ICD-10-CM

## 2020-05-21 LAB
ALBUMIN SERPL-MCNC: 3.8 G/DL (ref 3.4–5)
ALBUMIN/GLOB SERPL: 1.1 {RATIO} (ref 0.8–1.7)
ALP SERPL-CCNC: 84 U/L (ref 45–117)
ALT SERPL-CCNC: 50 U/L (ref 13–56)
ANION GAP SERPL CALC-SCNC: 3 MMOL/L (ref 3–18)
ARTERIAL PATENCY WRIST A: YES
AST SERPL-CCNC: 30 U/L (ref 10–38)
BASE EXCESS BLD CALC-SCNC: 1 MMOL/L
BASOPHILS # BLD: 0 K/UL (ref 0–0.1)
BASOPHILS NFR BLD: 0 % (ref 0–2)
BDY SITE: ABNORMAL
BILIRUB SERPL-MCNC: 0.6 MG/DL (ref 0.2–1)
BUN SERPL-MCNC: 18 MG/DL (ref 7–18)
BUN/CREAT SERPL: 17 (ref 12–20)
CALCIUM SERPL-MCNC: 9 MG/DL (ref 8.5–10.1)
CHLORIDE SERPL-SCNC: 105 MMOL/L (ref 100–111)
CK MB CFR SERPL CALC: NORMAL % (ref 0–4)
CK MB SERPL-MCNC: <1 NG/ML (ref 5–25)
CK SERPL-CCNC: 81 U/L (ref 26–192)
CO2 SERPL-SCNC: 26 MMOL/L (ref 21–32)
CREAT SERPL-MCNC: 1.09 MG/DL (ref 0.6–1.3)
DIFFERENTIAL METHOD BLD: ABNORMAL
EOSINOPHIL # BLD: 0 K/UL (ref 0–0.4)
EOSINOPHIL NFR BLD: 0 % (ref 0–5)
ERYTHROCYTE [DISTWIDTH] IN BLOOD BY AUTOMATED COUNT: 14.3 % (ref 11.6–14.5)
GAS FLOW.O2 O2 DELIVERY SYS: ABNORMAL L/MIN
GLOBULIN SER CALC-MCNC: 3.4 G/DL (ref 2–4)
GLUCOSE SERPL-MCNC: 310 MG/DL (ref 74–99)
HCO3 BLD-SCNC: 26.6 MMOL/L (ref 22–26)
HCT VFR BLD AUTO: 37.7 % (ref 35–45)
HGB BLD-MCNC: 12.8 G/DL (ref 12–16)
LYMPHOCYTES # BLD: 0.9 K/UL (ref 0.9–3.6)
LYMPHOCYTES NFR BLD: 11 % (ref 21–52)
MAGNESIUM SERPL-MCNC: 1.8 MG/DL (ref 1.6–2.6)
MCH RBC QN AUTO: 30.3 PG (ref 24–34)
MCHC RBC AUTO-ENTMCNC: 34 G/DL (ref 31–37)
MCV RBC AUTO: 89.3 FL (ref 74–97)
MONOCYTES # BLD: 0.2 K/UL (ref 0.05–1.2)
MONOCYTES NFR BLD: 3 % (ref 3–10)
NEUTS SEG # BLD: 7.2 K/UL (ref 1.8–8)
NEUTS SEG NFR BLD: 86 % (ref 40–73)
O2/TOTAL GAS SETTING VFR VENT: 40 %
PCO2 BLD: 45.3 MMHG (ref 35–45)
PEEP RESPIRATORY: 5 CMH2O
PH BLD: 7.38 [PH] (ref 7.35–7.45)
PIP ISTAT,IPIP: 15
PLATELET # BLD AUTO: 313 K/UL (ref 135–420)
PMV BLD AUTO: 9.1 FL (ref 9.2–11.8)
PO2 BLD: 259 MMHG (ref 80–100)
POTASSIUM SERPL-SCNC: 4.6 MMOL/L (ref 3.5–5.5)
PRESSURE SUPPORT SETTING VENT: 10 CMH2O
PROT SERPL-MCNC: 7.2 G/DL (ref 6.4–8.2)
RBC # BLD AUTO: 4.22 M/UL (ref 4.2–5.3)
SAO2 % BLD: 100 % (ref 92–97)
SERVICE CMNT-IMP: ABNORMAL
SODIUM SERPL-SCNC: 134 MMOL/L (ref 136–145)
SPECIMEN TYPE: ABNORMAL
SPONTANEOUS TIMED, IST: YES
TOTAL RESP. RATE, ITRR: 16
TROPONIN I SERPL-MCNC: <0.02 NG/ML (ref 0–0.04)
WBC # BLD AUTO: 8.3 K/UL (ref 4.6–13.2)

## 2020-05-21 PROCEDURE — 74011000250 HC RX REV CODE- 250: Performed by: EMERGENCY MEDICINE

## 2020-05-21 PROCEDURE — 93005 ELECTROCARDIOGRAM TRACING: CPT

## 2020-05-21 PROCEDURE — 94640 AIRWAY INHALATION TREATMENT: CPT

## 2020-05-21 PROCEDURE — 96365 THER/PROPH/DIAG IV INF INIT: CPT

## 2020-05-21 PROCEDURE — 82550 ASSAY OF CK (CPK): CPT

## 2020-05-21 PROCEDURE — 85025 COMPLETE CBC W/AUTO DIFF WBC: CPT

## 2020-05-21 PROCEDURE — 82803 BLOOD GASES ANY COMBINATION: CPT

## 2020-05-21 PROCEDURE — 94660 CPAP INITIATION&MGMT: CPT

## 2020-05-21 PROCEDURE — 83735 ASSAY OF MAGNESIUM: CPT

## 2020-05-21 PROCEDURE — 96375 TX/PRO/DX INJ NEW DRUG ADDON: CPT

## 2020-05-21 PROCEDURE — 99285 EMERGENCY DEPT VISIT HI MDM: CPT

## 2020-05-21 PROCEDURE — 36600 WITHDRAWAL OF ARTERIAL BLOOD: CPT

## 2020-05-21 PROCEDURE — 94762 N-INVAS EAR/PLS OXIMTRY CONT: CPT

## 2020-05-21 PROCEDURE — 80053 COMPREHEN METABOLIC PANEL: CPT

## 2020-05-21 PROCEDURE — 71045 X-RAY EXAM CHEST 1 VIEW: CPT

## 2020-05-21 PROCEDURE — 74011250636 HC RX REV CODE- 250/636: Performed by: EMERGENCY MEDICINE

## 2020-05-21 RX ORDER — MAGNESIUM SULFATE HEPTAHYDRATE 40 MG/ML
2 INJECTION, SOLUTION INTRAVENOUS
Status: COMPLETED | OUTPATIENT
Start: 2020-05-21 | End: 2020-05-22

## 2020-05-21 RX ORDER — IPRATROPIUM BROMIDE AND ALBUTEROL SULFATE 2.5; .5 MG/3ML; MG/3ML
3 SOLUTION RESPIRATORY (INHALATION)
Status: COMPLETED | OUTPATIENT
Start: 2020-05-21 | End: 2020-05-22

## 2020-05-21 RX ADMIN — METHYLPREDNISOLONE SODIUM SUCCINATE 125 MG: 125 INJECTION, POWDER, FOR SOLUTION INTRAMUSCULAR; INTRAVENOUS at 22:48

## 2020-05-21 RX ADMIN — MAGNESIUM SULFATE IN WATER 2 G: 40 INJECTION, SOLUTION INTRAVENOUS at 22:48

## 2020-05-21 RX ADMIN — IPRATROPIUM BROMIDE AND ALBUTEROL SULFATE 3 ML: .5; 3 SOLUTION RESPIRATORY (INHALATION) at 23:05

## 2020-05-21 RX ADMIN — IPRATROPIUM BROMIDE AND ALBUTEROL SULFATE 3 ML: .5; 3 SOLUTION RESPIRATORY (INHALATION) at 22:48

## 2020-05-21 RX ADMIN — IPRATROPIUM BROMIDE AND ALBUTEROL SULFATE 3 ML: .5; 3 SOLUTION RESPIRATORY (INHALATION) at 23:00

## 2020-05-22 LAB
ANION GAP SERPL CALC-SCNC: 8 MMOL/L (ref 3–18)
BASOPHILS # BLD: 0 K/UL (ref 0–0.1)
BASOPHILS NFR BLD: 0 % (ref 0–2)
BNP SERPL-MCNC: 64 PG/ML (ref 0–900)
BUN SERPL-MCNC: 22 MG/DL (ref 7–18)
BUN/CREAT SERPL: 21 (ref 12–20)
CALCIUM SERPL-MCNC: 9.6 MG/DL (ref 8.5–10.1)
CHLORIDE SERPL-SCNC: 102 MMOL/L (ref 100–111)
CO2 SERPL-SCNC: 24 MMOL/L (ref 21–32)
CREAT SERPL-MCNC: 1.06 MG/DL (ref 0.6–1.3)
DIFFERENTIAL METHOD BLD: ABNORMAL
EOSINOPHIL # BLD: 0 K/UL (ref 0–0.4)
EOSINOPHIL NFR BLD: 0 % (ref 0–5)
ERYTHROCYTE [DISTWIDTH] IN BLOOD BY AUTOMATED COUNT: 14.3 % (ref 11.6–14.5)
EST. AVERAGE GLUCOSE BLD GHB EST-MCNC: 194 MG/DL
GLUCOSE BLD STRIP.AUTO-MCNC: 225 MG/DL (ref 70–110)
GLUCOSE BLD STRIP.AUTO-MCNC: 256 MG/DL (ref 70–110)
GLUCOSE BLD STRIP.AUTO-MCNC: 264 MG/DL (ref 70–110)
GLUCOSE BLD STRIP.AUTO-MCNC: 269 MG/DL (ref 70–110)
GLUCOSE SERPL-MCNC: 297 MG/DL (ref 74–99)
HBA1C MFR BLD: 8.4 % (ref 4.2–5.6)
HCT VFR BLD AUTO: 36.2 % (ref 35–45)
HGB BLD-MCNC: 12.2 G/DL (ref 12–16)
LACTATE SERPL-SCNC: 4 MMOL/L (ref 0.4–2)
LACTATE SERPL-SCNC: 5.6 MMOL/L (ref 0.4–2)
LYMPHOCYTES # BLD: 0.8 K/UL (ref 0.9–3.6)
LYMPHOCYTES NFR BLD: 9 % (ref 21–52)
MCH RBC QN AUTO: 30.3 PG (ref 24–34)
MCHC RBC AUTO-ENTMCNC: 33.7 G/DL (ref 31–37)
MCV RBC AUTO: 89.8 FL (ref 74–97)
MONOCYTES # BLD: 0.1 K/UL (ref 0.05–1.2)
MONOCYTES NFR BLD: 1 % (ref 3–10)
NEUTS SEG # BLD: 7.7 K/UL (ref 1.8–8)
NEUTS SEG NFR BLD: 90 % (ref 40–73)
PLATELET # BLD AUTO: 299 K/UL (ref 135–420)
PMV BLD AUTO: 9.2 FL (ref 9.2–11.8)
POTASSIUM SERPL-SCNC: 4.7 MMOL/L (ref 3.5–5.5)
RBC # BLD AUTO: 4.03 M/UL (ref 4.2–5.3)
SODIUM SERPL-SCNC: 134 MMOL/L (ref 136–145)
WBC # BLD AUTO: 8.6 K/UL (ref 4.6–13.2)

## 2020-05-22 PROCEDURE — 82962 GLUCOSE BLOOD TEST: CPT

## 2020-05-22 PROCEDURE — 36415 COLL VENOUS BLD VENIPUNCTURE: CPT

## 2020-05-22 PROCEDURE — 94640 AIRWAY INHALATION TREATMENT: CPT

## 2020-05-22 PROCEDURE — 74011250637 HC RX REV CODE- 250/637: Performed by: STUDENT IN AN ORGANIZED HEALTH CARE EDUCATION/TRAINING PROGRAM

## 2020-05-22 PROCEDURE — 97535 SELF CARE MNGMENT TRAINING: CPT

## 2020-05-22 PROCEDURE — 74011000250 HC RX REV CODE- 250: Performed by: STUDENT IN AN ORGANIZED HEALTH CARE EDUCATION/TRAINING PROGRAM

## 2020-05-22 PROCEDURE — 83036 HEMOGLOBIN GLYCOSYLATED A1C: CPT

## 2020-05-22 PROCEDURE — 94660 CPAP INITIATION&MGMT: CPT

## 2020-05-22 PROCEDURE — 5A09357 ASSISTANCE WITH RESPIRATORY VENTILATION, LESS THAN 24 CONSECUTIVE HOURS, CONTINUOUS POSITIVE AIRWAY PRESSURE: ICD-10-PCS | Performed by: EMERGENCY MEDICINE

## 2020-05-22 PROCEDURE — 74011636637 HC RX REV CODE- 636/637: Performed by: FAMILY MEDICINE

## 2020-05-22 PROCEDURE — 80048 BASIC METABOLIC PNL TOTAL CA: CPT

## 2020-05-22 PROCEDURE — 97165 OT EVAL LOW COMPLEX 30 MIN: CPT

## 2020-05-22 PROCEDURE — 83605 ASSAY OF LACTIC ACID: CPT

## 2020-05-22 PROCEDURE — 83880 ASSAY OF NATRIURETIC PEPTIDE: CPT

## 2020-05-22 PROCEDURE — 74011636637 HC RX REV CODE- 636/637: Performed by: STUDENT IN AN ORGANIZED HEALTH CARE EDUCATION/TRAINING PROGRAM

## 2020-05-22 PROCEDURE — 94762 N-INVAS EAR/PLS OXIMTRY CONT: CPT

## 2020-05-22 PROCEDURE — 74011250636 HC RX REV CODE- 250/636: Performed by: STUDENT IN AN ORGANIZED HEALTH CARE EDUCATION/TRAINING PROGRAM

## 2020-05-22 PROCEDURE — 65660000004 HC RM CVT STEPDOWN

## 2020-05-22 PROCEDURE — 85025 COMPLETE CBC W/AUTO DIFF WBC: CPT

## 2020-05-22 PROCEDURE — 87040 BLOOD CULTURE FOR BACTERIA: CPT

## 2020-05-22 PROCEDURE — 97162 PT EVAL MOD COMPLEX 30 MIN: CPT

## 2020-05-22 PROCEDURE — 74011000250 HC RX REV CODE- 250: Performed by: EMERGENCY MEDICINE

## 2020-05-22 RX ORDER — LORATADINE 10 MG/1
10 TABLET ORAL
Status: DISCONTINUED | OUTPATIENT
Start: 2020-05-22 | End: 2020-05-26 | Stop reason: HOSPADM

## 2020-05-22 RX ORDER — FUROSEMIDE 20 MG/1
20 TABLET ORAL EVERY OTHER DAY
Status: DISCONTINUED | OUTPATIENT
Start: 2020-05-22 | End: 2020-05-22

## 2020-05-22 RX ORDER — HEPARIN SODIUM 5000 [USP'U]/ML
5000 INJECTION, SOLUTION INTRAVENOUS; SUBCUTANEOUS EVERY 8 HOURS
Status: DISCONTINUED | OUTPATIENT
Start: 2020-05-22 | End: 2020-05-26 | Stop reason: HOSPADM

## 2020-05-22 RX ORDER — PANTOPRAZOLE SODIUM 40 MG/1
40 TABLET, DELAYED RELEASE ORAL DAILY
Status: DISCONTINUED | OUTPATIENT
Start: 2020-05-22 | End: 2020-05-26 | Stop reason: HOSPADM

## 2020-05-22 RX ORDER — INSULIN GLARGINE 100 [IU]/ML
18 INJECTION, SOLUTION SUBCUTANEOUS
Status: COMPLETED | OUTPATIENT
Start: 2020-05-22 | End: 2020-05-22

## 2020-05-22 RX ORDER — IPRATROPIUM BROMIDE 0.5 MG/2.5ML
500 SOLUTION RESPIRATORY (INHALATION)
Status: DISCONTINUED | OUTPATIENT
Start: 2020-05-22 | End: 2020-05-22

## 2020-05-22 RX ORDER — SIMETHICONE 80 MG
80 TABLET,CHEWABLE ORAL
Status: DISCONTINUED | OUTPATIENT
Start: 2020-05-22 | End: 2020-05-26 | Stop reason: HOSPADM

## 2020-05-22 RX ORDER — FUROSEMIDE 20 MG/1
20 TABLET ORAL DAILY
Status: DISCONTINUED | OUTPATIENT
Start: 2020-05-22 | End: 2020-05-26 | Stop reason: HOSPADM

## 2020-05-22 RX ORDER — DEXTROSE 50 % IN WATER (D50W) INTRAVENOUS SYRINGE
25-50 AS NEEDED
Status: DISCONTINUED | OUTPATIENT
Start: 2020-05-22 | End: 2020-05-26 | Stop reason: HOSPADM

## 2020-05-22 RX ORDER — IPRATROPIUM BROMIDE AND ALBUTEROL SULFATE 2.5; .5 MG/3ML; MG/3ML
SOLUTION RESPIRATORY (INHALATION)
Status: DISCONTINUED
Start: 2020-05-22 | End: 2020-05-22

## 2020-05-22 RX ORDER — AZITHROMYCIN 250 MG/1
250 TABLET, FILM COATED ORAL
Status: DISCONTINUED | OUTPATIENT
Start: 2020-05-22 | End: 2020-05-22

## 2020-05-22 RX ORDER — IPRATROPIUM BROMIDE AND ALBUTEROL SULFATE 2.5; .5 MG/3ML; MG/3ML
3 SOLUTION RESPIRATORY (INHALATION)
Status: DISCONTINUED | OUTPATIENT
Start: 2020-05-22 | End: 2020-05-26 | Stop reason: HOSPADM

## 2020-05-22 RX ORDER — BUDESONIDE 1 MG/2ML
1000 INHALANT ORAL
Status: DISCONTINUED | OUTPATIENT
Start: 2020-05-22 | End: 2020-05-26 | Stop reason: HOSPADM

## 2020-05-22 RX ORDER — ACETAMINOPHEN 325 MG/1
325 TABLET ORAL
Status: DISCONTINUED | OUTPATIENT
Start: 2020-05-22 | End: 2020-05-26 | Stop reason: HOSPADM

## 2020-05-22 RX ORDER — INSULIN LISPRO 100 [IU]/ML
INJECTION, SOLUTION INTRAVENOUS; SUBCUTANEOUS
Status: DISCONTINUED | OUTPATIENT
Start: 2020-05-22 | End: 2020-05-26 | Stop reason: HOSPADM

## 2020-05-22 RX ORDER — HYDROCHLOROTHIAZIDE 12.5 MG/1
12.5 CAPSULE ORAL DAILY
Status: DISCONTINUED | OUTPATIENT
Start: 2020-05-22 | End: 2020-05-26 | Stop reason: HOSPADM

## 2020-05-22 RX ORDER — HYDROCHLOROTHIAZIDE 25 MG/1
12.5 TABLET ORAL DAILY
Status: DISCONTINUED | OUTPATIENT
Start: 2020-05-22 | End: 2020-05-22

## 2020-05-22 RX ORDER — SODIUM CHLORIDE 9 MG/ML
150 INJECTION, SOLUTION INTRAVENOUS CONTINUOUS
Status: DISCONTINUED | OUTPATIENT
Start: 2020-05-22 | End: 2020-05-25

## 2020-05-22 RX ORDER — MAGNESIUM SULFATE 100 %
4 CRYSTALS MISCELLANEOUS AS NEEDED
Status: DISCONTINUED | OUTPATIENT
Start: 2020-05-22 | End: 2020-05-26 | Stop reason: HOSPADM

## 2020-05-22 RX ORDER — IPRATROPIUM BROMIDE AND ALBUTEROL SULFATE 2.5; .5 MG/3ML; MG/3ML
3 SOLUTION RESPIRATORY (INHALATION)
Status: DISCONTINUED | OUTPATIENT
Start: 2020-05-22 | End: 2020-05-22

## 2020-05-22 RX ORDER — ASPIRIN 81 MG/1
81 TABLET ORAL DAILY
Status: DISCONTINUED | OUTPATIENT
Start: 2020-05-22 | End: 2020-05-26 | Stop reason: HOSPADM

## 2020-05-22 RX ORDER — AZITHROMYCIN 250 MG/1
250 TABLET, FILM COATED ORAL SEE ADMIN INSTRUCTIONS
Status: DISCONTINUED | OUTPATIENT
Start: 2020-05-22 | End: 2020-05-22

## 2020-05-22 RX ORDER — AZITHROMYCIN 250 MG/1
250 TABLET, FILM COATED ORAL
Status: DISCONTINUED | OUTPATIENT
Start: 2020-05-22 | End: 2020-05-26 | Stop reason: HOSPADM

## 2020-05-22 RX ORDER — INSULIN GLARGINE 100 [IU]/ML
35 INJECTION, SOLUTION SUBCUTANEOUS EVERY EVENING
Status: DISCONTINUED | OUTPATIENT
Start: 2020-05-22 | End: 2020-05-25

## 2020-05-22 RX ORDER — FLUTICASONE PROPIONATE 50 MCG
2 SPRAY, SUSPENSION (ML) NASAL DAILY
Status: DISCONTINUED | OUTPATIENT
Start: 2020-05-22 | End: 2020-05-26 | Stop reason: HOSPADM

## 2020-05-22 RX ORDER — LISINOPRIL 20 MG/1
20 TABLET ORAL 2 TIMES DAILY
Status: DISCONTINUED | OUTPATIENT
Start: 2020-05-22 | End: 2020-05-26 | Stop reason: HOSPADM

## 2020-05-22 RX ORDER — ARFORMOTEROL TARTRATE 15 UG/2ML
15 SOLUTION RESPIRATORY (INHALATION)
Status: DISCONTINUED | OUTPATIENT
Start: 2020-05-22 | End: 2020-05-22

## 2020-05-22 RX ORDER — INSULIN GLARGINE 100 [IU]/ML
35 INJECTION, SOLUTION SUBCUTANEOUS DAILY
Status: DISCONTINUED | OUTPATIENT
Start: 2020-05-22 | End: 2020-05-22

## 2020-05-22 RX ORDER — MONTELUKAST SODIUM 10 MG/1
10 TABLET ORAL
Status: DISCONTINUED | OUTPATIENT
Start: 2020-05-22 | End: 2020-05-26 | Stop reason: HOSPADM

## 2020-05-22 RX ADMIN — SODIUM CHLORIDE 75 ML/HR: 900 INJECTION, SOLUTION INTRAVENOUS at 13:17

## 2020-05-22 RX ADMIN — LISINOPRIL 20 MG: 20 TABLET ORAL at 09:12

## 2020-05-22 RX ADMIN — BUDESONIDE 1000 MCG: 1 SUSPENSION RESPIRATORY (INHALATION) at 07:16

## 2020-05-22 RX ADMIN — HEPARIN SODIUM 5000 UNITS: 5000 INJECTION INTRAVENOUS; SUBCUTANEOUS at 13:18

## 2020-05-22 RX ADMIN — METHYLPREDNISOLONE SODIUM SUCCINATE 40 MG: 40 INJECTION, POWDER, FOR SOLUTION INTRAMUSCULAR; INTRAVENOUS at 21:41

## 2020-05-22 RX ADMIN — HYDROCHLOROTHIAZIDE 12.5 MG: 12.5 CAPSULE ORAL at 09:12

## 2020-05-22 RX ADMIN — ARFORMOTEROL TARTRATE 15 MCG: 15 SOLUTION RESPIRATORY (INHALATION) at 07:16

## 2020-05-22 RX ADMIN — SIMETHICONE CHEW TAB 80 MG 80 MG: 80 TABLET ORAL at 18:59

## 2020-05-22 RX ADMIN — LORATADINE 10 MG: 10 TABLET ORAL at 21:45

## 2020-05-22 RX ADMIN — IPRATROPIUM BROMIDE AND ALBUTEROL SULFATE 3 ML: .5; 3 SOLUTION RESPIRATORY (INHALATION) at 02:43

## 2020-05-22 RX ADMIN — PANTOPRAZOLE SODIUM 40 MG: 40 TABLET, DELAYED RELEASE ORAL at 09:12

## 2020-05-22 RX ADMIN — INSULIN LISPRO 9 UNITS: 100 INJECTION, SOLUTION INTRAVENOUS; SUBCUTANEOUS at 13:17

## 2020-05-22 RX ADMIN — FUROSEMIDE 20 MG: 20 TABLET ORAL at 09:11

## 2020-05-22 RX ADMIN — IPRATROPIUM BROMIDE AND ALBUTEROL SULFATE 3 ML: .5; 3 SOLUTION RESPIRATORY (INHALATION) at 15:52

## 2020-05-22 RX ADMIN — HEPARIN SODIUM 5000 UNITS: 5000 INJECTION INTRAVENOUS; SUBCUTANEOUS at 21:35

## 2020-05-22 RX ADMIN — INSULIN LISPRO 6 UNITS: 100 INJECTION, SOLUTION INTRAVENOUS; SUBCUTANEOUS at 09:20

## 2020-05-22 RX ADMIN — INSULIN LISPRO 9 UNITS: 100 INJECTION, SOLUTION INTRAVENOUS; SUBCUTANEOUS at 21:37

## 2020-05-22 RX ADMIN — ASPIRIN 81 MG: 81 TABLET, COATED ORAL at 09:12

## 2020-05-22 RX ADMIN — BUDESONIDE 1000 MCG: 1 SUSPENSION RESPIRATORY (INHALATION) at 20:35

## 2020-05-22 RX ADMIN — IPRATROPIUM BROMIDE AND ALBUTEROL SULFATE 3 ML: .5; 3 SOLUTION RESPIRATORY (INHALATION) at 20:35

## 2020-05-22 RX ADMIN — INSULIN GLARGINE 18 UNITS: 100 INJECTION, SOLUTION SUBCUTANEOUS at 04:31

## 2020-05-22 RX ADMIN — AZITHROMYCIN MONOHYDRATE 250 MG: 250 TABLET ORAL at 13:18

## 2020-05-22 RX ADMIN — HEPARIN SODIUM 5000 UNITS: 5000 INJECTION INTRAVENOUS; SUBCUTANEOUS at 04:31

## 2020-05-22 RX ADMIN — INSULIN LISPRO 6 UNITS: 100 INJECTION, SOLUTION INTRAVENOUS; SUBCUTANEOUS at 17:48

## 2020-05-22 RX ADMIN — FLUTICASONE PROPIONATE 2 SPRAY: 50 SPRAY, METERED NASAL at 09:16

## 2020-05-22 RX ADMIN — MONTELUKAST 10 MG: 10 TABLET, FILM COATED ORAL at 04:31

## 2020-05-22 RX ADMIN — LISINOPRIL 20 MG: 20 TABLET ORAL at 21:45

## 2020-05-22 RX ADMIN — INSULIN GLARGINE 35 UNITS: 100 INJECTION, SOLUTION SUBCUTANEOUS at 21:35

## 2020-05-22 RX ADMIN — IPRATROPIUM BROMIDE 0.5 MG: 0.5 SOLUTION RESPIRATORY (INHALATION) at 07:16

## 2020-05-22 RX ADMIN — MONTELUKAST 10 MG: 10 TABLET, FILM COATED ORAL at 21:45

## 2020-05-22 RX ADMIN — METHYLPREDNISOLONE SODIUM SUCCINATE 40 MG: 40 INJECTION, POWDER, FOR SOLUTION INTRAMUSCULAR; INTRAVENOUS at 13:18

## 2020-05-22 RX ADMIN — IPRATROPIUM BROMIDE AND ALBUTEROL SULFATE 3 ML: .5; 3 SOLUTION RESPIRATORY (INHALATION) at 12:34

## 2020-05-22 NOTE — PROGRESS NOTES
0700:  Bedside shift change report given to 23 Lata Rosario RN (oncoming nurse) by Griselda Lay, RN (offgoing nurse). Report included the following information SBAR, Kardex and Cardiac Rhythm NSR.     1450:   Dr. Lata York wants patient to try to breathe on 2L NC instead of continuous BiPAP; patient's O2 saturation is 97% on 2L NC    1457: Dr Judy Alvarez of [facility] was informed of patient's O2 change and a diet order for patient was placed    1900:   Bedside shift change report given to Griselda Lay, RN (oncoming nurse) by Claudine Rosario RN (offgoing nurse). Report included the following information SBAR, Kardex and Cardiac Rhythm NSR.

## 2020-05-22 NOTE — PROGRESS NOTES
Problem: Mobility Impaired (Adult and Pediatric)  Goal: *Acute Goals and Plan of Care (Insert Text)  Description: Physical Therapy Goals  Initiated 5/22/2020 and to be accomplished within 7 day(s)  1. Patient will move from supine to sit and sit to supine , scoot up and down and roll side to side in bed with independence. 2.  Patient will transfer from bed to chair and chair to bed with modified independence using the least restrictive device. 3.  Patient will perform sit to stand with modified independence. 4.  Patient will ambulate with modified independence for 25 feet with the least restrictive device. 5.  Patient will ascend/descend 2 stairs with 1 handrail(s) with supervision/set-up. PLOF: Pt lives with family in a 2 story home with 2 ESTEVAN, lives on the first floor, on 2L home O2, has a rollator that she uses some. Outcome: Progressing Towards Goal     PHYSICAL THERAPY EVALUATION    Patient: Kendrick Eubanks (1 y.o. female)  Date: 5/22/2020  Primary Diagnosis: COPD with acute exacerbation ([facility] [address] [J44.1]        Precautions:      WBAT    ASSESSMENT :  Based on the objective data described below, the patient presents with decreased activity tolerance, generalized weakness, impaired functional mobility. Pt cleared by nursing to participate and pt on Bipap upon arrival so limited evaluation was completed. Pt was seated EOB and reports she has been transferring herself to the [facility] next to the bed. Pt is able to perform bed mobility with mod as well as stood with no AD with supervision. Pt reporting SOB with mobility however unable to perform gait due to being on short Bipap cord at this time. Will continue to follow pt in the acute setting to maximize functional mobility and safety prior to discharge home. Pt left sitting EOB with all needs met. Patient will benefit from skilled intervention to address the above impairments.   Patient's rehabilitation potential is considered to be Good  Factors which may influence rehabilitation potential include:   [x]         None noted  []         Mental ability/status  []         Medical condition  []         Home/family situation and support systems  []         Safety awareness  []         Pain tolerance/management  []         Other:      PLAN :  Recommendations and Planned Interventions:   [x]           Bed Mobility Training             [x]    Neuromuscular Re-Education  [x]           Transfer Training                   []    Orthotic/Prosthetic Training  [x]           Gait Training                          []    Modalities  [x]           Therapeutic Exercises           []    Edema Management/Control  [x]           Therapeutic Activities            [x]    Family Training/Education  [x]           Patient Education  []           Other (comment):    Frequency/Duration: Patient will be followed by physical therapy 1-2 times per day/4-7 days per week to address goals. Discharge Recommendations: Home Health  Further Equipment Recommendations for Discharge: N/A     SUBJECTIVE:   Patient stated im feeling ok.     OBJECTIVE DATA SUMMARY:     Past Medical History:   Diagnosis Date    Asthma     Chronic lung disease     COPD     Cystocele, midline     Diabetes mellitus ([facility] [address]     GERD (gastroesophageal reflux disease)     Hidradenitis suppurativa     Hyperlipidemia     Hypertension     SHERRIE on CPAP     CPAP    Stress incontinence      Past Surgical History:   Procedure Laterality Date    BREAST SURGERY PROCEDURE UNLISTED      Right breast benign tumor removal    HX APPENDECTOMY      HX CHOLECYSTECTOMY      HX DILATION AND CURETTAGE      Dysfunctional uterine bleeding, thought 2/2 fibroids    HX TUBAL LIGATION       Barriers to Learning/Limitations: None  Compensate with: N/A  Home Situation:  Home Situation  Home Environment: Private residence  # Steps to Enter: 0  One/Two Story Residence: One story  Living Alone: No  Support Systems: Family member(s), Friends \ neighbors  Patient Expects to be Discharged to[de-identified] Private residence  Current DME Used/Available at Home: CPAP, Nebulizer  Critical Behavior:  Neurologic State: Alert  Orientation Level: Oriented X4  Cognition: Follows commands  Safety/Judgement: Fall prevention  Psychosocial  Patient Behaviors: Calm; Cooperative  Purposeful Interaction: Yes                 Strength:    Strength: Generally decreased, functional       Tone & Sensation:   Tone: Normal    Sensation: Intact       Range Of Motion:  AROM: Within functional limits    Functional Mobility:  Bed Mobility:     Supine to Sit: Modified independent  Sit to Supine: Modified independent  Scooting: Modified independent  Transfers:  Sit to Stand: Supervision  Stand to Sit: Supervision                       Balance:   Sitting: Intact  Standing: Intact; With support    Ambulation/Gait Training:    Gait Description (WDL): (deferred 2/2 being on short bipap cord at this time )     Pain:  Pain level pre-treatment: 0/10   Pain level post-treatment: 0/10   Reports \"chest tightness\"    Activity Tolerance:   Good    Please refer to the flowsheet for vital signs taken during this treatment. After treatment:   []         Patient left in no apparent distress sitting up in chair  [x]         Patient left in no apparent distress sitting on edge of bed  [x]         Call bell left within reach  [x]         Nursing notified  []         Caregiver present  []         Bed alarm activated  []         SCDs applied    COMMUNICATION/EDUCATION:   [x]         Role of Physical Therapy in the acute care setting. [x]         Fall prevention education was provided and the patient/caregiver indicated understanding. [x]         Patient/family have participated as able in goal setting and plan of care. []         Patient/family agree to work toward stated goals and plan of care. []         Patient understands intent and goals of therapy, but is neutral about his/her participation.   [] Patient is unable to participate in goal setting/plan of care: ongoing with therapy staff.  []         Other:     Thank you for this referral.  Valeria Curry   Time Calculation: 8 mins      Eval Complexity: History: MEDIUM  Complexity : 1-2 comorbidities / personal factors will impact the outcome/ POC Exam:MEDIUM Complexity : 3 Standardized tests and measures addressing body structure, function, activity limitation and / or participation in recreation  Presentation: MEDIUM Complexity : Evolving with changing characteristics  Clinical Decision Making:Medium Complexity    Overall Complexity:MEDIUM

## 2020-05-22 NOTE — H&P
Admission History and Physical  [facility]      Patient: Ines Stanford MRN: [id number]  CSN: [id number]    YOB: 1960  Age: 60 y.o. Sex: female      DOA: 5/21/2020       HPI:   Ines Stanford is a 60 y.o. female with PMH of COPD on 2L of home O2 and multiple exacerbations requiring hospitalization this year, IDDM2, HTN, HFpEF, SHERRIE on bipap, GERD, allergic rhinitis, now presenting with sob and wheeze. Pt was seen in the ER nine days ago for similar symptoms and was discharged on prednisone 50mg x3 days. A COVID-19 test on 5/13/2020 was negative. Pt stated she felt better after steroids for a few days then began feeling symptomatic again. At baseline (on home 2L NC) she can walk around home w/ dyspnea. Now dyspneic at rest, and while using her Trilogy machine. Not improving w/ home breathing treatments. Associated w/ fatigue, wheezing, and chest tightness. No chills, congestion or change in baseline dry cough. Pt has been hospitalized 4 times this year for COPD exastcerbations. FU with Pulmonology was cancelled after last hospitalization due to covid. On presentation to the ER, she was tachypnea to 32 while on 2L of oxygen via nasal cannula, O2 sats were 96%. She was afebrile with stable BP. HR was mildly elevated to 94 bpm. Pt placed on bipap.     ED Course (See objective for values/interpretations):  Labs obtained: CBC, CMP, Cardiac panel, ABG, CXR, EKG  Medications administered: Mag, duo-neb  Imaging obtained: CXR, EKG    Review of Systems -   General ROS: negative for chills, fever, night sweats, weight gain and weight loss, +fatigue  Psychological ROS: negative for anxiety and depression  Ophthalmic ROS: negative for blurry vision, decreased vision or loss of vision  ENT ROS: negative for headaches, hearing change or visual changes  Hematological and Lymphatic ROS: negative for bruising, jaundice  Respiratory ROS: negative hemoptysis, orthopnea, paroxysmal dyspnea +cough, sob, and wheeze  Cardiovascular ROS: negative for chest pain, loss of consciousness, or palpitations, +chest tightness, edema   Gastrointestinal ROS: negative blood in stools, change in stools, constipation, diarrhea, hematemesis, melena, nausea/vomiting or swallowing difficulty/pain, +abdo pain  Genito-Urinary ROS: negative for dysuria, hematuria or urinary frequency/urgency  Musculoskeletal ROS: negative for joint pain, joint swelling or muscle pain  Neurological ROS: negative for dizziness, headaches, numbness/tingling or weakness  Dermatological ROS: negative for rash or skin lesion changes    Past Medical History:   Diagnosis Date    Asthma     Chronic lung disease     COPD     Cystocele, midline     Diabetes mellitus ([facility] [address]     GERD (gastroesophageal reflux disease)     Hidradenitis suppurativa     Hyperlipidemia     Hypertension     SHERRIE on CPAP     CPAP    Stress incontinence        Past Surgical History:   Procedure Laterality Date    BREAST SURGERY PROCEDURE UNLISTED      Right breast benign tumor removal    HX APPENDECTOMY      HX CHOLECYSTECTOMY      HX DILATION AND CURETTAGE      Dysfunctional uterine bleeding, thought 2/2 fibroids    HX TUBAL LIGATION         Family History   Problem Relation Age of Onset    Hypertension Mother     Stroke Mother     Breast Cancer Mother         Bilateral mastectomies    Cancer Mother         ovarian and breast    Heart Failure Mother     Heart Attack Father         2011    Heart Surgery Father         CABG    Heart Failure Father     COPD Sister         Heavy smoker    Cancer Sister         ovarian    Heart Failure Sister     Lung Disease Sister     Asthma Child     Cancer Maternal Aunt         pancreatic    Cancer Maternal Grandfather         stomach       Social History     Socioeconomic History    Marital status: LEGALLY      Spouse name: Not on file    Number of children: Not on file    Years of education: Not on file    Highest education level: Not on file   Tobacco Use    Smoking status: Former Smoker     Packs/day: 1.00     Years: 30.00     Pack years: 30.00     Types: Cigarettes     Start date: 1966     Last attempt to quit: 2006     Years since quittin.7    Smokeless tobacco: Former User    Tobacco comment: 1-1.5 packs per day   Substance and Sexual Activity    Alcohol use: No    Drug use: No    Sexual activity: Not Currently       Allergies   Allergen Reactions    Ancef [Cefazolin] Hives    Contrast Agent [Iodine] Anaphylaxis, Shortness of Breath and Swelling     Needs pre-medication for IV contrast with Benadryl, Solu-Medrol    Fish Containing Products Anaphylaxis     Pt states she had a severe allergic reaction at 9 y/o.  Statins-Hmg-Coa Reductase Inhibitors Myalgia    Metformin Other (comments)     Abdominal pain, diarrhea.  Codeine Other (comments)     Altered mental status       Prior to Admission Medications   Prescriptions Last Dose Informant Patient Reported? Taking? NOVOLOG FLEXPEN U-100 INSULIN 100 unit/mL inpn 2020 at Unknown time Self No Yes   Sig: Continue home Sliding scale insulin as prior to admission   Patient taking differently: 1 Units by SubCUTAneous route three (3) times daily. If BG <100=0u  101-150=5u  151-250=8u  251-300=12u  >300 call MD     OXYGEN-AIR DELIVERY SYSTEMS 2020 at Unknown time  Yes Yes   Si L by Nasal route continuous. [facility]   albuterol sulfate (PROVENTIL;VENTOLIN) 2.5 mg/0.5 mL nebu nebulizer solution 2020 at Unknown time  No Yes   Si.5 mL by Nebulization route four (4) times daily as needed for Wheezing. To be used with HyperSal nebulizer solution. ICD code: COPD J44.1   albuterol sulfate 90 mcg/actuation aepb Unknown at Unknown time  No No   Sig: Take 1 Puff by inhalation every four (4) hours. Patient taking differently: Take 1 Puff by inhalation every four (4) hours as needed.    aspirin delayed-release 81 mg tablet 2020 at Unknown time  Yes Yes   Sig: Take 81 mg by mouth daily. azithromycin (ZITHROMAX) 250 mg tablet Unknown at Unknown time  No No   Sig: Take 1 Tab by mouth daily. Monday-Friday. fluticasone propion-salmeterol (ADVAIR HFA) 459-07 mcg/actuation inhaler 2020 at Unknown time  Yes Yes   Sig: Take 2 Puffs by inhalation two (2) times a day. fluticasone propionate (FLONASE ALLERGY RELIEF) 50 mcg/actuation nasal spray 2020 at Unknown time  Yes Yes   Si Sprays by Both Nostrils route daily. furosemide (Lasix) 20 mg tablet 2020 at Unknown time  Yes Yes   Sig: Take 20 mg by mouth every other day. hydroCHLOROthiazide (HYDRODIURIL) 12.5 mg tablet 2020 at Unknown time  Yes Yes   Sig: Take 12.5 mg by mouth daily. insulin glargine (LANTUS,BASAGLAR) 100 unit/mL (3 mL) inpn 2020 at Unknown time  No Yes   Si Units by SubCUTAneous route nightly. Please inject 40 units every bedtime. Decrease to 35 units every bedtime after 7 days of 40 units. Indications: type 2 diabetes mellitus   lactobacillus sp. 50 billion cpu (BIO-K PLUS) 50 billion cell -375 mg cap capsule Unknown at Unknown time  No No   Sig: Take 1 Cap by mouth daily. lisinopril (PRINIVIL, ZESTRIL) 20 mg tablet 2020 at Unknown time  Yes Yes   Sig: Take 20 mg by mouth two (2) times a day. loratadine (CLARITIN) 10 mg tablet 2020 at Unknown time  Yes Yes   Sig: Take 10 mg by mouth daily as needed for Allergies. montelukast (SINGULAIR) 10 mg tablet 2020 at Unknown time  Yes Yes   Sig: Take 10 mg by mouth nightly. simethicone (GAS-X) 125 mg capsule 2020 at Unknown time  Yes Yes   Sig: Take 125 mg by mouth four (4) times daily as needed for Flatulence. tiotropium bromide (SPIRIVA RESPIMAT) 2.5 mcg/actuation inhaler 2020 at Unknown time  Yes Yes   Sig: Take 2 Puffs by inhalation daily.       Facility-Administered Medications: None       Physical Exam:     Patient Vitals for the past 24 hrs:   Temp Pulse Resp BP SpO2   05/22/20 0330 -- 80 13 104/90 95 %   05/22/20 0321 -- -- -- -- 97 %   05/22/20 0315 -- 84 22 152/85 97 %   05/22/20 0300 -- 78 22 120/57 96 %   05/22/20 0245 -- 73 18 122/66 97 %   05/22/20 0230 -- 75 21 123/61 99 %   05/22/20 0215 -- 80 25 132/70 99 %   05/22/20 0200 -- 76 22 118/62 98 %   05/22/20 0130 -- 78 21 134/73 98 %   05/22/20 0100 -- 83 24 134/74 98 %   05/22/20 0030 -- 81 21 128/55 97 %   05/22/20 0000 -- -- -- 133/71 --   05/21/20 2330 -- 90 21 134/60 98 %   05/21/20 2306 -- 90 24 (!) 137/91 99 %   05/21/20 2300 -- 94 17 183/86 100 %   05/21/20 2258 -- -- -- -- 100 %   05/21/20 2230 -- 94 (!) 32 169/73 98 %   05/21/20 2211 98 °F (36.7 °C) 94 (!) 32 151/80 96 %       Physical Exam:   General:  Alert and responsive, mild distress   HEENT: Conjunctiva pink, sclera anicteric. PERRLA, EOMI. Pharynx moist, nonerythematous. Moist mucous membranes. Thyroid not enlarged, no nodules. Lymph: No cervical, supraclavicular, occipital or submandibular lymphadenopathy. No other gross abnormalities present. CV:  RRR, no murmurs/rubs/gallops. No visible pulsations or thrills. Resp: On bipap. Dyspneic w/ conversation. Scattered wheeze throughout. Equal expansion bilaterally. Abd:  Soft, obese, diffusly tender in lower abdomen. No rebound or guarding. Reducible umbilical hernia, BS+  MS:  No joint deformity or instability. No atrophy. Neuro: A+Ox3. 5/5 strength bilateral upper extremities and lower extremities. Ext:  2+ radial and dp pulses bilaterally. Trace edema in Lower ext. Skin:  No rashes, lesions, or ulcers. Good turgor.     IMAGING:    Recent Results (from the past 12 hour(s))   CBC WITH AUTOMATED DIFF    Collection Time: 05/21/20 10:30 PM   Result Value Ref Range    WBC 8.3 4.6 - 13.2 K/uL    RBC 4.22 4.20 - 5.30 M/uL    HGB 12.8 12.0 - 16.0 g/dL    HCT 37.7 35.0 - 45.0 %    MCV 89.3 74.0 - 97.0 FL    MCH 30.3 24.0 - 34.0 PG    MCHC 34.0 31.0 - 37.0 g/dL    RDW 14.3 11.6 - 14.5 %    PLATELET 124 453 - 754 K/uL    MPV 9.1 (L) 9.2 - 11.8 FL    NEUTROPHILS 86 (H) 40 - 73 %    LYMPHOCYTES 11 (L) 21 - 52 %    MONOCYTES 3 3 - 10 %    EOSINOPHILS 0 0 - 5 %    BASOPHILS 0 0 - 2 %    ABS. NEUTROPHILS 7.2 1.8 - 8.0 K/UL    ABS. LYMPHOCYTES 0.9 0.9 - 3.6 K/UL    ABS. MONOCYTES 0.2 0.05 - 1.2 K/UL    ABS. EOSINOPHILS 0.0 0.0 - 0.4 K/UL    ABS. BASOPHILS 0.0 0.0 - 0.1 K/UL    DF AUTOMATED     METABOLIC PANEL, COMPREHENSIVE    Collection Time: 05/21/20 10:30 PM   Result Value Ref Range    Sodium 134 (L) 136 - 145 mmol/L    Potassium 4.6 3.5 - 5.5 mmol/L    Chloride 105 100 - 111 mmol/L    CO2 26 21 - 32 mmol/L    Anion gap 3 3.0 - 18 mmol/L    Glucose 310 (H) 74 - 99 mg/dL    BUN 18 7.0 - 18 MG/DL    Creatinine 1.09 0.6 - 1.3 MG/DL    BUN/Creatinine ratio 17 12 - 20      GFR est AA >60 >60 ml/min/1.73m2    GFR est non-AA 51 (L) >60 ml/min/1.73m2    Calcium 9.0 8.5 - 10.1 MG/DL    Bilirubin, total 0.6 0.2 - 1.0 MG/DL    ALT (SGPT) 50 13 - 56 U/L    AST (SGOT) 30 10 - 38 U/L    Alk.  phosphatase 84 45 - 117 U/L    Protein, total 7.2 6.4 - 8.2 g/dL    Albumin 3.8 3.4 - 5.0 g/dL    Globulin 3.4 2.0 - 4.0 g/dL    A-G Ratio 1.1 0.8 - 1.7     MAGNESIUM    Collection Time: 05/21/20 10:30 PM   Result Value Ref Range    Magnesium 1.8 1.6 - 2.6 mg/dL   CARDIAC PANEL,(CK, CKMB & TROPONIN)    Collection Time: 05/21/20 10:30 PM   Result Value Ref Range    CK - MB <1.0 <3.6 ng/ml    CK-MB Index  0.0 - 4.0 %     CALCULATION NOT PERFORMED WHEN RESULT IS BELOW LINEAR LIMIT    CK 81 26 - 192 U/L    Troponin-I, QT <0.02 0.0 - 0.045 NG/ML   EKG, 12 LEAD, INITIAL    Collection Time: 05/21/20 10:41 PM   Result Value Ref Range    Ventricular Rate 87 BPM    Atrial Rate 87 BPM    P-R Interval 148 ms    QRS Duration 80 ms    Q-T Interval 376 ms    QTC Calculation (Bezet) 452 ms    Calculated P Axis 61 degrees    Calculated R Axis -4 degrees    Calculated T Axis 52 degrees    Diagnosis       Normal sinus rhythm  Cannot rule out Anterior infarct , age undetermined  Abnormal ECG  When compared with ECG of 13-MAY-2020 18:17,  Minimal criteria for Anterior infarct are now present     POC G3    Collection Time: 05/21/20 11:00 PM   Result Value Ref Range    Device: BIPAP      FIO2 (POC) 40 %    pH (POC) 7.377 7.35 - 7.45      pCO2 (POC) 45.3 (H) 35.0 - 45.0 MMHG    pO2 (POC) 259 (H) 80 - 100 MMHG    HCO3 (POC) 26.6 (H) 22 - 26 MMOL/L    sO2 (POC) 100 (H) 92 - 97 %    Base excess (POC) 1 mmol/L    PEEP/CPAP (POC) 5 cmH2O    PIP (POC) 15      Pressure support 10 cmH2O    Allens test (POC) YES      Total resp. rate 16      Site RIGHT RADIAL      Specimen type (POC) ARTERIAL      Performed by Simone Aparicio     Spontaneous timed YES       Xr Chest Port    Result Date: 5/22/2020  Impression:  1. No acute finding. 2. Stable bibasilar streaky opacities, probably scarring or atelectasis. 3. Hyperinflation/emphysema. Assessment/Plan:   Lucian De Dios is a 60 y.o. female with PMH of COPD on home O2, IDDM2, HTN, HFpEF, SHERRIE on CPAP, GERD, allergic rhinitis, now admitted for COPD exacerbation.      Acute on chronic hypoxic respiratory failure likely secondary to COPD Exacerbation   Likely 2/2 to environmental trigger vs infection. Infection less likely since pt with no URI symptoms, no fever or leukoysis. Patient tachypnic in the ED, placed on bipap, received duonebx4, mag, IV solumedrol 125mg. ABG: pH 7.377, pCO2 45.5, pO2 259, HCO3 26.6 (on bipap)   CXR: no acute findings, hyperinflation  -Pt has hx of asthma/COPD on 2L home 02 and sleeps with [employer] machine. Followed by Dr. Trinity Alves in OP.   -Home meds: prednisone 10mg every other day, albuterol neb, spiriva, singular, flonase trelegy nightly  -This is fifth admission in 2020 for COPD, most recent 4/8/20-4/11/20. Pt sating 99% on bipap and became dyspneic w/ conversation in ED. Will admit and treat for COPD exacerbation.        Plan:  -Admit to stepdown for bipap  -oxygen as needed, goal 88-92%  -NPO ext meds while on continuous bipap  -Duonebs q4hrs scheduled  -Arformoterol nebulizer BID and Pulmicort 250mcg/2ml nebulizer BID  -Spiriva daily sub per pharm  -Continue home loratadine, flonase and singular  -Solumedrol 60mg IV daily  -Reassess need for IVF in AM   -Continue Azithromycin 250mg daily (Monday-Friday)  -Daily CBC and BMP  -Vitals per unit  -PT/OT/CM    Abdominal Pain: Persistent from previous admission, was evaluated by general surgery (Dr. Salvador Galvez) 3-4 weeks ago. He suggested weight loss and follow-up in 6 weeks. Takes Tylenol prn for the pain. CMP normal today in the ED with the exception of elevated glucose and mild hyponatremia.  - Monitor  - Tylenol prn    SHERRIE/pulm htn  Intolerant to Cpap. On trilogy machine nightly at home.   - BIPAP qhs in place home trilogy     Insulin dependent Type 2 Diabetes  Home lantus 35u pm and novolog SSI. BS in the high 315s on admission. Pt stated she didn't take her lantas tonight. Will give 18u lantas now.  Anticipate raised BS while on steroids.   -Continue home Lantus 18 units pm and increase as needed  -SSI  -Accuchecks ACHS     Hypertension, HFpEF  Stable. Echo 7/7/18 EF 66-70%, LV mild concentric hypertrophy, No left regional wall motion abnorm. SBP in ED elevated  to 140s-170s. Pt on lisinopril 20mg BID and HCTZ 12.5mg daily at home. Pt also on Lasix 20mg daily PRN for lower extremity edema at home.  Patient endorsing increased swelling in bilateral upper arms and abdominal.   -Continue home Lasix 20mg daily  -Continue Lisinopril 20mg BID  -Continue HCTZ 12.5 mg daily     GERD  Pt takes omeprazole 40mg daily and pepcid for breakthrough symptoms at home.   -Continue protonix 40mg daily     Diet NPO w/ meds    DVT Prophylaxis SQH   GI Prophylaxis Protonix   Code status Full   Disposition Stepdown>2MN      Point of Contact Patricia Layne  Relationship: Daughter  [phone]       Eriberto Barnes MD, PGY-1  [address]  5/22/2020    Shay DO Valeriy   Resident   FAMILY PRACTICE   H&P   Shared   Date of Service:  05/22/20 0256               Expand All Collapse All      []Hide copied text    []Rachel for details    Admission History and Physical  [facility]        Patient: Gely Mejia MRN: [id number]  CSN: [id number]    YOB: 1960  Age: 60 y.o. Sex: female       DOA: 5/21/2020       HPI:      Gely Mejia is a 60 y.o. female with PMH of COPD on 2L of home O2, IDDM2, HTN, HFpEF, SHERRIE on CPAP, GERD, allergic rhinitis, now presenting with complaint of SOB x2 days. She was seen in the ER nine days ago for similar symptoms and was discharged on prednisone 50mg x3 days. A COVID-19 test on 5/13/2020 was negative. She felt better for approx 1-2 days, then began feeling symptomatic again. She is having SOB at night while using her Trilogy machine and having SOB while on her home oxygen. Associated symptoms include wheezing, coughing, chest tightness and chest soreness. She has not had relief with Duo-Neb treatments at home. On presentation to the ER, she was tachypnea to 32 while on 2L of oxygen via nasal cannula, O2 sats were 96%. She was afebrile with stable BP.  HR was mildly elevated to 94 bpm.         ED Course: CBC, CMP, Cardiac panel, ABG, CXR, EKG     For full ROS, PMH, PSH, Family Hx, Social Hx, Home medications, and allergies see intern note above.                           Physical Exam:      Patient Vitals for the past 24 hrs:    Temp Pulse Resp BP SpO2   05/22/20 0230 -- 75 21 123/61 99 %   05/22/20 0215 -- 80 25 132/70 99 %   05/22/20 0200 -- 76 22 118/62 98 %   05/22/20 0130 -- 78 21 134/73 98 %   05/22/20 0100 -- 83 24 134/74 98 %   05/22/20 0030 -- 81 21 128/55 97 %   05/22/20 0000 -- -- -- 133/71 --   05/21/20 2330 -- 90 21 134/60 98 %   05/21/20 2306 -- 90 24 (!) 137/91 99 %   05/21/20 2300 -- 94 17 183/86 100 %   05/21/20 2258 -- -- -- -- 100 %   05/21/20 2230 -- 94 (!) 32 169/73 98 %   05/21/20 2211 98 °F (36.7 °C) 94 (!) 32 151/80 96 %         Physical Exam:   General:  Alert and Responsive and in mild to moderate acute distress. HEENT: Conjunctiva pink, sclera anicteric. EOMI. Pharynx moist, nonerythematous. Moist mucous membranes. No cervical, supraclavicular, occipital or submandibular lymphadenopathy. No other gross abnormalities present. CV:  RRR, no murmurs. No visible pulsations or thrills. RESP:  On BiPap. Scattered expiratory wheezing. Short of breath with conversation. Equal expansion bilaterally. ABD:  Soft, obese, diffuse tenderness, reducible umbilical hernia. MS:  No joint deformity or instability. No atrophy. Neuro:  5/5 strength bilateral upper extremities and lower extremities. A+Ox3. Ext:  No edema. 2+ radial and dp pulses bilaterally. Skin:  No rashes, lesions, or ulcers. Good turgor.     IMAGING:   Xr Chest Port     Result Date: 5/22/2020  Impression:  1. No acute finding. 2. Stable bibasilar streaky opacities, probably scarring or atelectasis. 3. Hyperinflation/emphysema.         Recent Results          Recent Results (from the past 12 hour(s))   CBC WITH AUTOMATED DIFF     Collection Time: 05/21/20 10:30 PM   Result Value Ref Range     WBC 8.3 4.6 - 13.2 K/uL     RBC 4.22 4.20 - 5.30 M/uL     HGB 12.8 12.0 - 16.0 g/dL     HCT 37.7 35.0 - 45.0 %     MCV 89.3 74.0 - 97.0 FL     MCH 30.3 24.0 - 34.0 PG     MCHC 34.0 31.0 - 37.0 g/dL     RDW 14.3 11.6 - 14.5 %     PLATELET 835 644 - 545 K/uL     MPV 9.1 (L) 9.2 - 11.8 FL     NEUTROPHILS 86 (H) 40 - 73 %     LYMPHOCYTES 11 (L) 21 - 52 %     MONOCYTES 3 3 - 10 %     EOSINOPHILS 0 0 - 5 %     BASOPHILS 0 0 - 2 %     ABS. NEUTROPHILS 7.2 1.8 - 8.0 K/UL     ABS. LYMPHOCYTES 0.9 0.9 - 3.6 K/UL     ABS. MONOCYTES 0.2 0.05 - 1.2 K/UL     ABS. EOSINOPHILS 0.0 0.0 - 0.4 K/UL     ABS.  BASOPHILS 0.0 0.0 - 0.1 K/UL     DF AUTOMATED METABOLIC PANEL, COMPREHENSIVE     Collection Time: 05/21/20 10:30 PM   Result Value Ref Range     Sodium 134 (L) 136 - 145 mmol/L     Potassium 4.6 3.5 - 5.5 mmol/L     Chloride 105 100 - 111 mmol/L     CO2 26 21 - 32 mmol/L     Anion gap 3 3.0 - 18 mmol/L     Glucose 310 (H) 74 - 99 mg/dL     BUN 18 7.0 - 18 MG/DL     Creatinine 1.09 0.6 - 1.3 MG/DL     BUN/Creatinine ratio 17 12 - 20       GFR est AA >60 >60 ml/min/1.73m2     GFR est non-AA 51 (L) >60 ml/min/1.73m2     Calcium 9.0 8.5 - 10.1 MG/DL     Bilirubin, total 0.6 0.2 - 1.0 MG/DL     ALT (SGPT) 50 13 - 56 U/L     AST (SGOT) 30 10 - 38 U/L     Alk.  phosphatase 84 45 - 117 U/L     Protein, total 7.2 6.4 - 8.2 g/dL     Albumin 3.8 3.4 - 5.0 g/dL     Globulin 3.4 2.0 - 4.0 g/dL     A-G Ratio 1.1 0.8 - 1.7     MAGNESIUM     Collection Time: 05/21/20 10:30 PM   Result Value Ref Range     Magnesium 1.8 1.6 - 2.6 mg/dL   CARDIAC PANEL,(CK, CKMB & TROPONIN)     Collection Time: 05/21/20 10:30 PM   Result Value Ref Range     CK - MB <1.0 <3.6 ng/ml     CK-MB Index   0.0 - 4.0 %       CALCULATION NOT PERFORMED WHEN RESULT IS BELOW LINEAR LIMIT     CK 81 26 - 192 U/L     Troponin-I, QT <0.02 0.0 - 0.045 NG/ML   EKG, 12 LEAD, INITIAL     Collection Time: 05/21/20 10:41 PM   Result Value Ref Range     Ventricular Rate 87 BPM     Atrial Rate 87 BPM     P-R Interval 148 ms     QRS Duration 80 ms     Q-T Interval 376 ms     QTC Calculation (Bezet) 452 ms     Calculated P Axis 61 degrees     Calculated R Axis -4 degrees     Calculated T Axis 52 degrees     Diagnosis           Normal sinus rhythm  Cannot rule out Anterior infarct , age undetermined  Abnormal ECG  When compared with ECG of 13-MAY-2020 18:17,  Minimal criteria for Anterior infarct are now present      POC G3     Collection Time: 05/21/20 11:00 PM   Result Value Ref Range     Device: BIPAP       FIO2 (POC) 40 %     pH (POC) 7.377 7.35 - 7.45       pCO2 (POC) 45.3 (H) 35.0 - 45.0 MMHG     pO2 (POC) 259 (H) 80 - 100 MMHG     HCO3 (POC) 26.6 (H) 22 - 26 MMOL/L     sO2 (POC) 100 (H) 92 - 97 %     Base excess (POC) 1 mmol/L     PEEP/CPAP (POC) 5 cmH2O     PIP (POC) 15       Pressure support 10 cmH2O     Allens test (POC) YES       Total resp. rate 16       Site RIGHT RADIAL       Specimen type (POC) ARTERIAL       Performed by Janel Meigs       Spontaneous timed YES                 Assessment/Plan:   60 y.o. female with PMH of COPD on 2L of home O2, IDDM2, HTN, HFpEF, SHERRIE on CPAP, GERD, allergic rhinitis, now admitted with COPD Exacerbation.     1. Acute SOB 2/2 to COPD exacerbation: Exacerbation could be 2/2 to viral infection vs pneumonia vs seasonal allergies. Patient is very well known to our service.  Patient is on Triligy at night time and continuous use of 2 L NC at home.  No increased oxygen requirement or hypoxia. ABG tonight was reassuring, she is chronically hypercapnic. Patient with Severe COPD with asthma component, steroid dependent. Properly prescribed ICS + LABA based on GOLD criteria.  (Advair -21 2 puffs bid, Spiriva 2 puffs every day, Albuterol, Singulair, home O2). She is afebrile, no signs or symptoms of infection. COVID-19 test negative nine days ago on 5/13/2020. WBC count negative, cardiac panel negative.   -admit to stepdown for BIPAP  -oxygen as needed, goal 88-92%  -BIPAP  -NPO while on BIPAP  -CBC, BMP daily  -Duonebs q 1-4 PRN  -IV solumedrol 60 BID 5 days  -vitals per routine  -consult to CM/PT/OT  -Fall precautions  -Continue home Singulair and antihistamine  -Home advair>arformotorol+budesonide   -Prolonged steroid taper on discharge     2. Abdominal Pain: Persistent from previous admission, was evaluated by general surgery (Dr. Nataliia Mccarthy) 3-4 weeks ago. He suggested weight loss and follow-up in 6 weeks. Takes Tylenol prn for the pain.  CMP normal today in the ED with the exception of elevated glucose and mild hyponatremia.  - Monitor  - Tylenol prn        Maria Fernanda Bauer D.O. PGY-2  [address]

## 2020-05-22 NOTE — CDMP QUERY
Pt admitted with    COPD exacerbation  Pt noted to have  HFpEF Yaya Anthony Please clarify  severity of    HFpEF  Chronic Systolic CHF  Chronic Diastolic CHF  Chronic Systolic and Diastolic CHF  Other, please specify  Clinically unable to determine The medical record reflects the following: 
 
  Risk Factors: HTN;   COPD   multiple  COPD admits ; 
 
  Clinical Indicators: last echo  2018-   EF 66-70%  H&P notes -   HFpEF Treatment: continues on home dose of Lasix 20mg po qd Thank you,    Milton Lane RN   [facility]  [phone]

## 2020-05-22 NOTE — PROGRESS NOTES
Problem: Falls - Risk of  Goal: *Absence of Falls  Description: Document Bianca Guerrero Fall Risk and appropriate interventions in the flowsheet. Outcome: Progressing Towards Goal  Note: Fall Risk Interventions:            Medication Interventions: Patient to call before getting OOB, Teach patient to arise slowly                   Problem: Patient Education: Go to Patient Education Activity  Goal: Patient/Family Education  Outcome: Progressing Towards Goal     Problem: Diabetes Self-Management  Goal: *Disease process and treatment process  Description: Define diabetes and identify own type of diabetes; list 3 options for treating diabetes. Outcome: Progressing Towards Goal  Goal: *Incorporating nutritional management into lifestyle  Description: Describe effect of type, amount and timing of food on blood glucose; list 3 methods for planning meals. Outcome: Progressing Towards Goal  Goal: *Incorporating physical activity into lifestyle  Description: State effect of exercise on blood glucose levels. Outcome: Progressing Towards Goal  Goal: *Developing strategies to promote health/change behavior  Description: Define the ABC's of diabetes; identify appropriate screenings, schedule and personal plan for screenings. Outcome: Progressing Towards Goal  Goal: *Using medications safely  Description: State effect of diabetes medications on diabetes; name diabetes medication taking, action and side effects. Outcome: Progressing Towards Goal  Goal: *Monitoring blood glucose, interpreting and using results  Description: Identify recommended blood glucose targets  and personal targets. Outcome: Progressing Towards Goal  Goal: *Prevention, detection, treatment of acute complications  Description: List symptoms of hyper- and hypoglycemia; describe how to treat low blood sugar and actions for lowering  high blood glucose level.   Outcome: Progressing Towards Goal  Goal: *Prevention, detection and treatment of chronic complications  Description: Define the natural course of diabetes and describe the relationship of blood glucose levels to long term complications of diabetes.   Outcome: Progressing Towards Goal  Goal: *Developing strategies to address psychosocial issues  Description: Describe feelings about living with diabetes; identify support needed and support network  Outcome: Progressing Towards Goal  Goal: *Insulin pump training  Outcome: Progressing Towards Goal  Goal: *Sick day guidelines  Outcome: Progressing Towards Goal  Goal: *Patient Specific Goal (EDIT GOAL, INSERT TEXT)  Outcome: Progressing Towards Goal     Problem: Patient Education: Go to Patient Education Activity  Goal: Patient/Family Education  Outcome: Progressing Towards Goal     Problem: Patient Education: Go to Patient Education Activity  Goal: Patient/Family Education  Outcome: Progressing Towards Goal     Problem: Patient Education: Go to Patient Education Activity  Goal: Patient/Family Education  Outcome: Progressing Towards Goal

## 2020-05-22 NOTE — DIABETES MGMT
T2DM with current A1c of  8.4% (5/22/2020). See separate notes, 03/31/2020, for assessment of home diabetes management/education. Home diabetes medications: Patient reported on 05/22/2020:  Gage Daron (lantus) pen insulin 40 units daily at bedtime. Novolog pen sliding scale insulin TID AC.     Patient seen this morning. She's on IV solumedrol 40 mg every 8 hours. POC BG report on 05/21/2020: None. POC BG report on 05/22/2020 at time of review: 269. Patient received 18 units of lantus insulin at 0431. Results for Delma Garvin (MRN [id number]) as of 5/22/2020 11:22   Ref. Range 5/21/2020 22:30 5/22/2020 05:56   Glucose Latest Ref Range: 74 - 99 mg/dL 310 (H) 297 (H)     Current hospital diabetes medications:  Basal lantus insulin 35 units daily at bedtime, first dose ordered 5/22/2022. Correctional lispro insulin ACHS. Modified to very resistant dose. Total daily dose insulin day before:  Admitted 5/22/2020:    Recommendation(s):  1.) titrate lantus insulin dose to     Assessment:  Patient is 60year old with past medical history including type 2 diabetes mellitus, asthma, COPD, GERD, hyperlipidemia, SHERRIE of Cpap, and hyperlipidemia - was admitted on 5/22/2020 with report of worsening shortness of breath on home oxygen.     Noted:  COPD exacerbation.  T2DM. Most recent blood glucose values:        Current A1c:     Lab Results   Component Value Date/Time    Hemoglobin A1c 8.4 (H) 05/22/2020 05:56 AM     This lab test reflects an estimated average blood glucose of 194 mg/dL  over the past 3 months. Diet: currently NPO.     Goals: Blood glucose will be within target range on  mg/dL by 5/25/2020    Education:  _X__  Refer to Diabetes Education Record: 04/09/2020                       ___  Education not indicated at this time

## 2020-05-22 NOTE — ED PROVIDER NOTES
Gely Mejia is a 60 y.o. female with a past medical history of hypertension, diabetes, hyperlipidemia, GERD, and COPD on 2 L of home oxygen at all times coming in complaining of shortness of breath. Patient states she was seen about a week ago for COPD exacerbation. She states that she was put on 3-day burst of prednisone and took these pills as prescribed. She states that she felt better for a few days but over the last couple days her shortness of breath has gotten much worse. She states she feels very \"tight. \"  She reports a mild chronic cough, but no increasing cough or copious sputum production. Denies any hemoptysis. Patient states that she does have some sharp chest pain with breathing or coughing. Denies any leg swelling. Patient denies any fevers or chills. She denies any recent sick contacts. Patient did get tested for COVID-19 1 week ago and this was negative. Patient states that she was in using her nebulizer every 4 hours, but does not seem to be helping. States that she feels very tired from working so hard to breathe over the last few days. No other complaints at this time.            Past Medical History:   Diagnosis Date    Asthma     Chronic lung disease     COPD     Cystocele, midline     Diabetes mellitus ([facility] [address]     GERD (gastroesophageal reflux disease)     Hidradenitis suppurativa     Hyperlipidemia     Hypertension     SHERRIE on CPAP     CPAP    Stress incontinence        Past Surgical History:   Procedure Laterality Date    BREAST SURGERY PROCEDURE UNLISTED      Right breast benign tumor removal    HX APPENDECTOMY      HX CHOLECYSTECTOMY      HX DILATION AND CURETTAGE      Dysfunctional uterine bleeding, thought 2/2 fibroids    HX TUBAL LIGATION           Family History:   Problem Relation Age of Onset    Hypertension Mother     Stroke Mother     Breast Cancer Mother         Bilateral mastectomies    Cancer Mother         ovarian and breast    Heart Failure Mother     Heart Attack Father         2011    Heart Surgery Father         CABG    Heart Failure Father     COPD Sister         Heavy smoker    Cancer Sister         ovarian    Heart Failure Sister     Lung Disease Sister     Asthma Child     Cancer Maternal Aunt         pancreatic    Cancer Maternal Grandfather         stomach       Social History     Socioeconomic History    Marital status: LEGALLY      Spouse name: Not on file    Number of children: Not on file    Years of education: Not on file    Highest education level: Not on file   Occupational History    Not on file   Social Needs    Financial resource strain: Not on file    Food insecurity     Worry: Not on file     Inability: Not on file    Transportation needs     Medical: Not on file     Non-medical: Not on file   Tobacco Use    Smoking status: Former Smoker     Packs/day: 1.00     Years: 30.00     Pack years: 30.00     Types: Cigarettes     Start date: 1966     Last attempt to quit: 2006     Years since quittin.7    Smokeless tobacco: Former User    Tobacco comment: 1-1.5 packs per day   Substance and Sexual Activity    Alcohol use: No    Drug use: No    Sexual activity: Not Currently   Lifestyle    Physical activity     Days per week: Not on file     Minutes per session: Not on file    Stress: Not on file   Relationships    Social connections     Talks on phone: Not on file     Gets together: Not on file     Attends Congregational service: Not on file     Active member of club or organization: Not on file     Attends meetings of clubs or organizations: Not on file     Relationship status: Not on file    Intimate partner violence     Fear of current or ex partner: Not on file     Emotionally abused: Not on file     Physically abused: Not on file     Forced sexual activity: Not on file   Other Topics Concern    Not on file   Social History Narrative    Not on file         ALLERGIES: Ancef [cefazolin]; Contrast agent [iodine]; Fish containing products; Statins-hmg-coa reductase inhibitors; Metformin; and Codeine    Review of Systems   Constitutional: Positive for fatigue. Negative for chills and fever. Respiratory: Positive for cough, chest tightness, shortness of breath and wheezing. Cardiovascular: Positive for chest pain. Negative for leg swelling. Gastrointestinal: Negative. Negative for abdominal pain, nausea and vomiting. Musculoskeletal: Negative. Negative for myalgias. Skin: Negative. Negative for rash. Neurological: Negative. Negative for dizziness and light-headedness. All other systems reviewed and are negative. Vitals:    05/22/20 0130 05/22/20 0200 05/22/20 0215 05/22/20 0230   BP: 134/73 118/62 132/70 123/61   Pulse: 78 76 80 75   Resp: 21 22 25 21   Temp:       SpO2: 98% 98% 99% 99%   Weight:       Height:                Physical Exam  Vitals signs reviewed. Constitutional:       Appearance: Normal appearance. She is well-developed. She is obese. HENT:      Head: Normocephalic and atraumatic. Mouth/Throat:      Mouth: Mucous membranes are moist.   Eyes:      Extraocular Movements: Extraocular movements intact. Conjunctiva/sclera: Conjunctivae normal.      Pupils: Pupils are equal, round, and reactive to light. Neck:      Musculoskeletal: Normal range of motion and neck supple. Cardiovascular:      Rate and Rhythm: Normal rate and regular rhythm. Heart sounds: S1 normal and S2 normal. No murmur. No friction rub. No gallop. Pulmonary:      Effort: Tachypnea, accessory muscle usage, prolonged expiration and respiratory distress present. Breath sounds: Decreased air movement present. Comments: Patient is tachypneic and using accessory muscles to breathe. She has decreased breath sounds throughout and some wheezing noted in the upper lungs bilaterally. Speaking in 2-3 word sentences.   Abdominal:      General: There is no distension. Tenderness: There is no abdominal tenderness. Musculoskeletal: Normal range of motion. General: No tenderness. Comments: Trace pitting edema in the lower extremities bilaterally   Skin:     General: Skin is warm. Findings: No rash. Neurological:      General: No focal deficit present. Mental Status: She is alert and oriented to person, place, and time. Sensory: No sensory deficit. Motor: No weakness. MDM  Number of Diagnoses or Management Options  Diagnosis management comments: Jose De Jesus Carrillo is a 60 y.o. female with a history of COPD coming over that appears to be a COPD exacerbation. Patient does have significantly increased work of breathing. Had negative COVID-19 test 1 week ago. Denies fevers or chills. Denies hemoptysis. Negative dimer and BNP 1 week ago. Will start on BiPAP and get ABG. Will treat with duo nebs Solu-Medrol, and magnesium.          Procedures      Vitals:  Patient Vitals for the past 12 hrs:   Temp Pulse Resp BP SpO2   05/22/20 0230 -- 75 21 123/61 99 %   05/22/20 0215 -- 80 25 132/70 99 %   05/22/20 0200 -- 76 22 118/62 98 %   05/22/20 0130 -- 78 21 134/73 98 %   05/22/20 0100 -- 83 24 134/74 98 %   05/22/20 0030 -- 81 21 128/55 97 %   05/22/20 0000 -- -- -- 133/71 --   05/21/20 2330 -- 90 21 134/60 98 %   05/21/20 2306 -- 90 24 (!) 137/91 99 %   05/21/20 2300 -- 94 17 183/86 100 %   05/21/20 2258 -- -- -- -- 100 %   05/21/20 2230 -- 94 (!) 32 169/73 98 %   05/21/20 2211 98 °F (36.7 °C) 94 (!) 32 151/80 96 %       Medications ordered:   Medications   albuterol-ipratropium (DUO-NEB) 2.5 MG-0.5 MG/3 ML (3 mL Nebulization Given 5/22/20 0243)   methylPREDNISolone (PF) (Solu-MEDROL) injection 125 mg (125 mg IntraVENous Given 5/21/20 2248)   magnesium sulfate 2 g/50 ml IVPB (premix or compounded) (0 g IntraVENous IV Completed 5/22/20 0005)         Lab findings:  Recent Results (from the past 12 hour(s))   CBC WITH AUTOMATED DIFF    Collection Time: 05/21/20 10:30 PM   Result Value Ref Range    WBC 8.3 4.6 - 13.2 K/uL    RBC 4.22 4.20 - 5.30 M/uL    HGB 12.8 12.0 - 16.0 g/dL    HCT 37.7 35.0 - 45.0 %    MCV 89.3 74.0 - 97.0 FL    MCH 30.3 24.0 - 34.0 PG    MCHC 34.0 31.0 - 37.0 g/dL    RDW 14.3 11.6 - 14.5 %    PLATELET 647 244 - 729 K/uL    MPV 9.1 (L) 9.2 - 11.8 FL    NEUTROPHILS 86 (H) 40 - 73 %    LYMPHOCYTES 11 (L) 21 - 52 %    MONOCYTES 3 3 - 10 %    EOSINOPHILS 0 0 - 5 %    BASOPHILS 0 0 - 2 %    ABS. NEUTROPHILS 7.2 1.8 - 8.0 K/UL    ABS. LYMPHOCYTES 0.9 0.9 - 3.6 K/UL    ABS. MONOCYTES 0.2 0.05 - 1.2 K/UL    ABS. EOSINOPHILS 0.0 0.0 - 0.4 K/UL    ABS. BASOPHILS 0.0 0.0 - 0.1 K/UL    DF AUTOMATED     METABOLIC PANEL, COMPREHENSIVE    Collection Time: 05/21/20 10:30 PM   Result Value Ref Range    Sodium 134 (L) 136 - 145 mmol/L    Potassium 4.6 3.5 - 5.5 mmol/L    Chloride 105 100 - 111 mmol/L    CO2 26 21 - 32 mmol/L    Anion gap 3 3.0 - 18 mmol/L    Glucose 310 (H) 74 - 99 mg/dL    BUN 18 7.0 - 18 MG/DL    Creatinine 1.09 0.6 - 1.3 MG/DL    BUN/Creatinine ratio 17 12 - 20      GFR est AA >60 >60 ml/min/1.73m2    GFR est non-AA 51 (L) >60 ml/min/1.73m2    Calcium 9.0 8.5 - 10.1 MG/DL    Bilirubin, total 0.6 0.2 - 1.0 MG/DL    ALT (SGPT) 50 13 - 56 U/L    AST (SGOT) 30 10 - 38 U/L    Alk.  phosphatase 84 45 - 117 U/L    Protein, total 7.2 6.4 - 8.2 g/dL    Albumin 3.8 3.4 - 5.0 g/dL    Globulin 3.4 2.0 - 4.0 g/dL    A-G Ratio 1.1 0.8 - 1.7     MAGNESIUM    Collection Time: 05/21/20 10:30 PM   Result Value Ref Range    Magnesium 1.8 1.6 - 2.6 mg/dL   CARDIAC PANEL,(CK, CKMB & TROPONIN)    Collection Time: 05/21/20 10:30 PM   Result Value Ref Range    CK - MB <1.0 <3.6 ng/ml    CK-MB Index  0.0 - 4.0 %     CALCULATION NOT PERFORMED WHEN RESULT IS BELOW LINEAR LIMIT    CK 81 26 - 192 U/L    Troponin-I, QT <0.02 0.0 - 0.045 NG/ML   EKG, 12 LEAD, INITIAL    Collection Time: 05/21/20 10:41 PM   Result Value Ref Range Ventricular Rate 87 BPM    Atrial Rate 87 BPM    P-R Interval 148 ms    QRS Duration 80 ms    Q-T Interval 376 ms    QTC Calculation (Bezet) 452 ms    Calculated P Axis 61 degrees    Calculated R Axis -4 degrees    Calculated T Axis 52 degrees    Diagnosis       Normal sinus rhythm  Cannot rule out Anterior infarct , age undetermined  Abnormal ECG  When compared with ECG of 13-MAY-2020 18:17,  Minimal criteria for Anterior infarct are now present     POC G3    Collection Time: 05/21/20 11:00 PM   Result Value Ref Range    Device: BIPAP      FIO2 (POC) 40 %    pH (POC) 7.377 7.35 - 7.45      pCO2 (POC) 45.3 (H) 35.0 - 45.0 MMHG    pO2 (POC) 259 (H) 80 - 100 MMHG    HCO3 (POC) 26.6 (H) 22 - 26 MMOL/L    sO2 (POC) 100 (H) 92 - 97 %    Base excess (POC) 1 mmol/L    PEEP/CPAP (POC) 5 cmH2O    PIP (POC) 15      Pressure support 10 cmH2O    Allens test (POC) YES      Total resp. rate 16      Site RIGHT RADIAL      Specimen type (POC) ARTERIAL      Performed by Odalys Arellano     Spontaneous timed YES         EKG interpretation by ED Physician: This rhythm rate of 87 bpm.  No acute ischemic changes. X-Ray, CT or other radiology findings or impressions:  XR CHEST PORT    (Results Pending)       Progress notes, Consult notes or additional Procedure notes:     Consult: Spoke to [facility] family medicine resident regarding patient's symptoms, exam, and improvement on BiPAP. They agree to admit to the stepdown unit under attending physician Nayely Craig    Reevaluation of patient:   I have reassessed the patient. Patient is feeling much better and resting comfortably in bed on BiPAP. Respiratory rate is decreased and patient is no longer grunting. Patient has better air movement on lung auscultation. Discussed all results and need for admission and she verbalizes understanding agrees with this plan.         Critical Care Time:  The services I provided to this patient were to treat and/or prevent clinically significant deterioration that could result in the failure of one or more body systems and/or organ systems due to COPD exacerbation and acute respiratory distress requiring BiPAP. Services included the following:  -reviewing nursing notes and old charts  -vital sign assessments  -direct patient care  -medication orders and management  -interpreting and reviewing diagnostic studies/labs  -re-evaluations  -documentation time    Aggregate critical care time was 35 minutes, which includes only time during which I was engaged in work directly related to the patient's care as described above, whether I was at bedside or elsewhere in the Emergency Department. It did not include time spent performing other reported procedures or the services of residents, students, nurses, or advance practice providers. Vanessa Brooks MD    2:57 AM      Disposition:  Diagnosis:   1. Acute exacerbation of chronic obstructive pulmonary disease (COPD) ([facility])        Disposition: Admit to stepdown unit    Follow-up Information    None          Patient's Medications   Start Taking    No medications on file   Continue Taking    ALBUTEROL SULFATE (PROVENTIL;VENTOLIN) 2.5 MG/0.5 ML NEBU NEBULIZER SOLUTION    0.5 mL by Nebulization route four (4) times daily as needed for Wheezing. To be used with HyperSal nebulizer solution. ICD code: COPD J44.1    ALBUTEROL SULFATE 90 MCG/ACTUATION AEPB    Take 1 Puff by inhalation every four (4) hours. ASPIRIN DELAYED-RELEASE 81 MG TABLET    Take 81 mg by mouth daily. AZITHROMYCIN (ZITHROMAX) 250 MG TABLET    Take 1 Tab by mouth daily. Monday-Friday. FLUTICASONE PROPION-SALMETEROL (ADVAIR HFA) 230-21 MCG/ACTUATION INHALER    Take 2 Puffs by inhalation two (2) times a day. FLUTICASONE PROPIONATE (FLONASE ALLERGY RELIEF) 50 MCG/ACTUATION NASAL SPRAY    2 Sprays by Both Nostrils route daily. FUROSEMIDE (LASIX) 20 MG TABLET    Take 20 mg by mouth every other day.     HYDROCHLOROTHIAZIDE (HYDRODIURIL) 12.5 MG TABLET    Take 12.5 mg by mouth daily. INSULIN GLARGINE (LANTUS,BASAGLAR) 100 UNIT/ML (3 ML) INPN    40 Units by SubCUTAneous route nightly. Please inject 40 units every bedtime. Decrease to 35 units every bedtime after 7 days of 40 units. Indications: type 2 diabetes mellitus    LACTOBACILLUS SP. 50 BILLION CPU (BIO-K PLUS) 50 BILLION CELL -375 MG CAP CAPSULE    Take 1 Cap by mouth daily. LISINOPRIL (PRINIVIL, ZESTRIL) 20 MG TABLET    Take 20 mg by mouth two (2) times a day. LORATADINE (CLARITIN) 10 MG TABLET    Take 10 mg by mouth daily as needed for Allergies. MONTELUKAST (SINGULAIR) 10 MG TABLET    Take 10 mg by mouth nightly. NOVOLOG FLEXPEN U-100 INSULIN 100 UNIT/ML INPN    Continue home Sliding scale insulin as prior to admission    OXYGEN-AIR DELIVERY SYSTEMS    2 L by Nasal route continuous. [facility]    PREDNISONE (DELTASONE) 20 MG TABLET    Take 60mg(3 tabs) daily for 3 days, then 40mg(2 tabs) daily for 3 days, then 20mg(1 tab) daily for 3 days, 10mg (1/2 tab) daily for 3 days. Then resume 10mg tab every other day. PREDNISONE (DELTASONE) 20 MG TABLET    Take 20 mg by mouth daily (with breakfast). Give with food. Take 60mg (3 tabs) for 3 days  Then 40mg (2tabs) for next 3 days  Then 20mg (1tab) for next 3 days  Then 10mg (0.5 tab) for next 3 days  Then 10mg (0.5 tab) every other day  Indications: worsening chronic obstructive pulmonary disease  Indications: worsening chronic obstructive pulmonary disease    SIMETHICONE (GAS-X) 125 MG CAPSULE    Take 125 mg by mouth four (4) times daily as needed for Flatulence. TIOTROPIUM BROMIDE (SPIRIVA RESPIMAT) 2.5 MCG/ACTUATION INHALER    Take 2 Puffs by inhalation daily.    These Medications have changed    No medications on file   Stop Taking    No medications on file

## 2020-05-22 NOTE — PROGRESS NOTES
[address]  RESPONSE TO [facility] INQUIRY      Patient: Hina Koch MRN: [id number]  CSN: [id number]    YOB: 1960  Age: 60 y.o. Sex: female         [address]  [address] by Abida Porter at 05/22/20 6716   Version 1 of 1   Author: Abida Porter Specialty: -- Author Type: [facility]  Filed: 05/22/20 0836 Date of Service: 05/22/20 0836 Status: Signed  : Abida Porter ([facility])       Pt admitted with    COPD exacerbation  Pt noted to have  HFpEF     .Please clarify  severity of    HFpEF                 Chronic Systolic CHF             Chronic Diastolic CHF             Chronic Systolic and Diastolic CHF             Other, please specify             Clinically unable to determine     The medical record reflects the following:       Risk Factors: HTN;   COPD   multiple  COPD admits ;       Clinical Indicators: last echo  2018-   EF 66-70%  H&P notes -   HFpEF       Treatment: continues on home dose of Lasix 20mg po qd      Thank you,    Carlota Smith       RESPONSE     Pt with age appropriate diastolic dysfunction and that would be most consistent with Chronic Diastolic CHF.     Tim Oviedo MD , PGY-1   [address] Medicine   Senior Pager: [phone]   May 22, 2020, 9:27 AM

## 2020-05-22 NOTE — PROGRESS NOTES
Reason for Admission:   COPD with acute exacerbation ([facility] [address] [J44.1]               RRAT Score:     46%             Resources/supports as identified by patient/family:   Lives with daughter, has personal care aids    Top Challenges facing patient (as identified by patient/family and CM):     breathing         Current Advanced Directive/Advance Care Plan:   no                          Plan for utilizing home health:    Yes, daughter did not know name of agency                       Likelihood of readmission:   HIGH    Transition of Care Plan:                    Initial assessment completed with lang seaman. Cognitive status of patient: oriented to time, place, person and situation. Face sheet information confirmed:  yes. The patient designates daughter Ale Fox to participate in her discharge plan and to receive any needed information. This patient lives in a single family home with daughter Ale Fox. Patient is not able to navigate steps as needed. Prior to hospitalization, patient was considered to be independent with ADLs/IADLS : no . Daughter and aid assist as needed. Patient has a current ACP document on file: no  The daughter will be available to transport patient home upon discharge. The patient already has oxygen and nebulizer through [facility], [facility]ology [facility], walker, patricia medical equipment available in the home. Patient is currently active with home health. Daughter does not know the name of personal care company, but would like to see if they do skilled care as well. Patient has not stayed in a skilled nursing facility or rehab. This patient is on dialysis :no       Currently, the discharge plan is Home with Home Health. The patient states that she can obtain her medications from the pharmacy, and take her medications as directed. Patient's current insurance is Medicare and Medicaid.       Care Management Interventions  PCP Verified by CM: Yes  Palliative Care Criteria Met (RRAT>21 & CHF Dx)?: No  Mode of Transport at Discharge: Self  Transition of Care Consult (CM Consult): Discharge Planning, Home Health  Physical Therapy Consult: Yes  Occupational Therapy Consult: Yes  Current Support Network: Relative's Home, Has Personal Caregivers  Confirm Follow Up Transport: Family  Discharge Location  Discharge Placement: Home with home health        JAMES Bess  Case Management  [phone]

## 2020-05-22 NOTE — ED TRIAGE NOTES
Pt reports chronic SOB with hx of COPD. Pt reports increased SOB for the last two days. No relief with home oxygen and Duo Neb treatments. Pt reports pain in her chest with breathing and coughing. Pt reports no exposure to COVID 19 but had a negative test 10 days ago.

## 2020-05-22 NOTE — PROGRESS NOTES
Problem: Self Care Deficits Care Plan (Adult)  Goal: *Acute Goals and Plan of Care (Insert Text)  Outcome: Resolved/Met     OCCUPATIONAL THERAPY EVALUATION/DISCHARGE    Patient: Michelle Thorpe (78 y.o. female)  Date: 5/22/2020  Primary Diagnosis: COPD with acute exacerbation ([facility] [J44.1]  Precautions:  Fall  PLOF: Patient was independent with self-care and functional mobility PTA. Patient has care aid that comes 5x/week for 8 hours. Patient reports still having AE from previous visits. ASSESSMENT AND RECOMMENDATIONS:  Based on the objective data described below, the patient presents with no deficits that impede pt function with ADLs, functional transfers, and functional mobility. At this time patient is safe to d/c home with family support. OT to d/c from caseload at this time. Skilled occupational therapy is not indicated at this time.   Discharge Recommendations: Home Health  Further Equipment Recommendations for Discharge: Patient has all DME      SUBJECTIVE:   Patient stated Marquita Martini live with my grandaumarie now and everything I need is one level    OBJECTIVE DATA SUMMARY:     Past Medical History:   Diagnosis Date    Asthma     Chronic lung disease     COPD     Cystocele, midline     Diabetes mellitus ([facility] [address]     GERD (gastroesophageal reflux disease)     Hidradenitis suppurativa     Hyperlipidemia     Hypertension     SHERRIE on CPAP     CPAP    Stress incontinence      Past Surgical History:   Procedure Laterality Date    BREAST SURGERY PROCEDURE UNLISTED      Right breast benign tumor removal    HX APPENDECTOMY      HX CHOLECYSTECTOMY      HX DILATION AND CURETTAGE      Dysfunctional uterine bleeding, thought 2/2 fibroids    HX TUBAL LIGATION       Barriers to Learning/Limitations: None  Compensate with: visual, verbal, tactile, kinesthetic cues/model    Home Situation:   Home Situation  Home Environment: Private residence  # Steps to Enter: 0  One/Two Story Residence: One story  Living Alone: No  Support Systems: Family member(s), Friends \ neighbors  Patient Expects to be Discharged to[de-identified] Private residence  Current DME Used/Available at Home: CPAP, Nebulizer  Tub or Shower Type: (sink bath)  [x]     Right hand dominant   []     Left hand dominant    Cognitive/Behavioral Status:  Neurologic State: Alert  Orientation Level: Oriented X4  Cognition: Follows commands  Safety/Judgement: Fall prevention; Awareness of environment    Skin: Intact  Edema: None noted    Vision/Perceptual:    Acuity: Within Defined Limits      Coordination: BUE  Coordination: Within functional limits  Fine Motor Skills-Upper: Left Intact; Right Intact    Gross Motor Skills-Upper: Left Intact; Right Intact    Balance:  Sitting: Intact  Standing: Intact    Strength: BUE  Strength: Generally decreased, functional    Tone & Sensation: BUE  Tone: Normal  Sensation: Intact       Range of Motion: BUE  AROM: Within functional limits      Functional Mobility and Transfers for ADLs:  Bed Mobility:  Supine to Sit: Modified independent  Sit to Supine: Modified independent  Scooting: Modified independent    Transfers:  Sit to Stand: Supervision  Stand to Sit: Supervision   Toilet Transfer : Supervision       ADL Assessment:  Feeding: Independent    Oral Facial Hygiene/Grooming: Independent    Bathing: Supervision    Upper Body Dressing: Independent    Lower Body Dressing: Supervision(standing)    Toileting: Supervision    ADL Intervention:  Grooming  Grooming Assistance: Independent  Washing Face: Independent      Upper Body Dressing Assistance  Dressing Assistance: [id number]: Independent    Lower Body Dressing Assistance  Dressing Assistance: Modified independent  Pants With Elastic Waist: Supervision(simulation)  Socks: Modified independent  Position Performed: Seated edge of bed;Standing    Cognitive Retraining  Safety/Judgement: Fall prevention; Awareness of environment    Pain:  Pain level pre-treatment: 2/10 chest  Pain level post-treatment: 2/10   Pain Intervention(s): Medication (see MAR); Response to intervention: Nurse notified, See doc flow    Activity Tolerance:   Fair+    Please refer to the flowsheet for vital signs taken during this treatment. After treatment:   []  Patient left in no apparent distress sitting up in chair  [x]  Patient left in no apparent distress in bed  [x]  Call bell left within reach  [x]  Nursing notified  []  Caregiver present  []  Bed alarm activated    COMMUNICATION/EDUCATION:   [x]      Role of Occupational Therapy in the acute care setting  [x]      Home safety education was provided and the patient/caregiver indicated understanding. [x]      Patient/family have participated as able and agree with findings and recommendations. []      Patient is unable to participate in plan of care at this time. Thank you for this referral.  Jus Andrade OTR/L  Time Calculation: 38 mins      Eval Complexity: History: MEDIUM Complexity : Expanded review of history including physical, cognitive and psychosocial  history ; Examination: LOW Complexity : 1-3 performance deficits relating to physical, cognitive , or psychosocial skils that result in activity limitations and / or participation restrictions ;    Decision Making:LOW Complexity : No comorbidities that affect functional and no verbal or physical assistance needed to complete eval tasks

## 2020-05-22 NOTE — DISCHARGE SUMMARY
[facility]  Discharge Summary    Patient: Nate Hampton MRN: [id number]  CSN: [id number]    YOB: 1960  Age: 60 y.o. Sex: female      Admission Date: 5/21/2020 Discharge Date: 5/26/2020   Attending: Jorge Gallegos MD PCP: Alex Holcomb MD     ===================================================================    Reason for Admission: COPD with acute exacerbation [facility]) [J44.1]    Discharge Diagnoses:   Acute on chronic hypoxic respiratory failure likely secondary to Asthma/COPD Exacerbation   Umbilical Hernia  SHERRIE/pulm htn  Insulin dependent Type 2 Diabetes  Hypertension, HFpEF  GERD    Important notes to PCP/ follow-up studies and evaluations   -Pt continued on home inhalers at discharge and Flovent was added to her regimen by Dr. Gene Rodriguez to follow-up outpatient with Dr. Genevieve Khoury about possible anti IL-5R or anti IL-4/13 therapy  -Pt with strong component of Asthma given environmental triggers for her symptoms.  -As an outpatient, patient may benefit from allergy referral for SCIT therapy   -Pt discharged on a 6 week taper of prednisone per pulmonology recommendations (Prednisone 60mg (3 tabs) for 7 days, 50mg (2 1/2 tabs) for 7 days, 40mg(2 tabs) for 7 days, 30mg (1 1/2 tabs) for 7 days, 20mg (1 tabs) for 7 days, 10mg (1/2 tab) for 7 days, then take 5 mg daily from then onwards). -O2 need with exertion reassessed at discharge   5/26/20, 1245:Pulse oximetry assessment   97% at rest on room air (if 88% or less, skip next steps)  96% while ambulating on room air  99% at rest on 2LPM  94% while ambulating on 2LPM      Pending labs and studies:  None    Operative Procedures:   None    Discharge Medications:     Current Discharge Medication List      START taking these medications    Details   fluticasone propionate (Flovent HFA) 220 mcg/actuation inhaler Take 1 Puff by inhalation two (2) times a day.   Qty: 1 Inhaler, Refills: 0 pantoprazole (PROTONIX) 40 mg tablet Take 1 Tab by mouth daily. Qty: 30 Tab, Refills: 0      predniSONE (DELTASONE) 20 mg tablet Please take 60mg Prednisone(3 tabs) for 7 days, 50mg (2 1/2 tabs) for 7 days, 40mg(2 tabs) for 7 days, 30mg (1 1/2 tabs) for 7 days, 20mg (1 tabs) for 7 days, 10mg (1/2 tab) for 7 days, then take 5 mg daily from then on wards. Qty: 80 Tab, Refills: 0         CONTINUE these medications which have CHANGED    Details   insulin glargine (LANTUS,BASAGLAR) 100 unit/mL (3 mL) inpn 40 Units by SubCUTAneous route nightly. Please inject 40 units every bedtime. Indications: type 2 diabetes mellitus  Qty: 28 Units, Refills: 0         CONTINUE these medications which have NOT CHANGED    Details   furosemide (Lasix) 20 mg tablet Take 20 mg by mouth every other day. simethicone (GAS-X) 125 mg capsule Take 125 mg by mouth four (4) times daily as needed for Flatulence. loratadine (CLARITIN) 10 mg tablet Take 10 mg by mouth daily as needed for Allergies. lisinopril (PRINIVIL, ZESTRIL) 20 mg tablet Take 20 mg by mouth two (2) times a day. albuterol sulfate (PROVENTIL;VENTOLIN) 2.5 mg/0.5 mL nebu nebulizer solution 0.5 mL by Nebulization route four (4) times daily as needed for Wheezing. To be used with HyperSal nebulizer solution. ICD code: COPD J44.1  Qty: 60 mL, Refills: 3      fluticasone propionate (FLONASE ALLERGY RELIEF) 50 mcg/actuation nasal spray 2 Sprays by Both Nostrils route daily. fluticasone propion-salmeterol (ADVAIR HFA) 230-21 mcg/actuation inhaler Take 2 Puffs by inhalation two (2) times a day. hydroCHLOROthiazide (HYDRODIURIL) 12.5 mg tablet Take 12.5 mg by mouth daily. tiotropium bromide (SPIRIVA RESPIMAT) 2.5 mcg/actuation inhaler Take 2 Puffs by inhalation daily.       NOVOLOG FLEXPEN U-100 INSULIN 100 unit/mL inpn Continue home Sliding scale insulin as prior to admission  Qty: 1 Pen, Refills: 0      OXYGEN-AIR DELIVERY SYSTEMS 2 L by Nasal route continuous. First Choice      aspirin delayed-release 81 mg tablet Take 81 mg by mouth daily. montelukast (SINGULAIR) 10 mg tablet Take 10 mg by mouth nightly. azithromycin (ZITHROMAX) 250 mg tablet Take 1 Tab by mouth daily. Monday-Friday. Qty: 30 Tab, Refills: 0    Associated Diagnoses: COPD exacerbation ([address]      lactobacillus sp. 50 billion cpu (BIO-K PLUS) 50 billion cell -375 mg cap capsule Take 1 Cap by mouth daily. Qty: 30 Cap, Refills: 0      albuterol sulfate 90 mcg/actuation aepb Take 1 Puff by inhalation every four (4) hours. Qty: 1 Inhaler, Refills: 0             Disposition: Home    Consultants:    Pulmonology-[facility] Course (including pertinent history and physical findings)  Papi Simon a 60 y.o. female with PMH of COPD on home O2, IDDM2, HTN, HFpEF, SHERRIE on CPAP, GERD, allergic rhinitis, who was admitted for Asthma/COPD exacerbation.      Acute on chronic hypoxic respiratory failure likely secondary to Asthma/COPD Exacerbation   Likely 2/2 to environmental trigger vs infection. Infection less likely since pt with no URI symptoms, no fever or leukoysis. Patient tachypnic in the ED, placed on bipap, received duonebx4, mag, IV solumedrol 125mg in the ED. ABG: pH 7.377, pCO2 45.5, pO2 259, HCO3 26.6 (on bipap).  CXR: no acute findings, hyperinflation. Patient is on 2L O2 at home. Follows Dr. Chas Mullen in outpatient. This is her fifth admission in 2020 for COPD/Asthma exacerbation, most recent 4/8/20-4/11/20. Per Dr. Jayleen Mabry, this admission, pt has strong asthma component given significant bronchospasm to a wide variety of classic environmental triggers (I.e. fumes, fragrances, chemicals, smoke, mold, etc).  Pulmonology following through hospital course, appreciate recs. Pt was maintained on BIPAP overnight. Pt also on Oxygen as needed, goal 88-92%.  Pt on Duonebs q4hrs scheduled, Pulmicort nebs 1mg BID, Albuterol PRN as well.   Continued home loratadine, flonase and singular while admitted. Pt was initially on Solumedrol 40mg IV q8hrs for 3 days. Pt then transitioned to prednisone 60mg and discharged on 6 week taper(Prednisone 60mg Prednisone(3 tabs) for 7 days, 50mg (2 1/2 tabs) for 7 days, 40mg(2 tabs) for 7 days, 30mg (1 1/2 tabs) for 7 days, 20mg (1 tabs) for 7 days, 10mg (1/2 tab) for 7 days, then take 5 mg daily from then onwards) per pulmonology recommendations. Continued Azithromycin 250mg x5 days(Mon-Fri). Pt was evaluated by PT/OT and home health was recommended but she declined home health. Pt discharged on 2-3L O2 when walking. Pt to FU outpatient with pulmonology(Dr. Tripp Gallegos) for discussion of possible anti IL-5R or anti IL-4/13 therapy. Pt discharged on Flovent HFA 220mcg 1 puffs BID per Pulmonology recommendations for dual ICS therapy.      Lactic acidosis(Resolved)-5.6 on admission. . No s/sx's of sepsis or infection, but will work up. Has had persistently elevated lactic acid in past admissions. BCx: NG2D, UA WNL, Lactic Acid 5/26/20: 2.2. Pt on IVF NS@ 150ml/hr and lactic acids q4hrs while pt's lactic acids elevated.      SHERRIE: Intolerant to Cpap. On trilogy machine nightly at home. Pt on BIPAP qhs in place home trilogy during hospitalization.     Insulin dependent Type 2 Diabetes  Home lantus 35u pm and novolog SSI. BS have been in the 200's- 300's. On high dose steroids, which will also contribute to her hyperglycemia. Increased Lantus to 40U at discharge. Please consider adjusting Lantus dosing as an outpatient as prednisone decreases with the 6 week taper. Pt was also maintained on SSI(Very resistant scale) and had Accuchecks ACHS with a diabetic diet.     Hypertension, HFpEF  ECHO (5/24/20) EF 65 - 70%. No regional wall motion abnormality. Moderate (grade 2) left ventricular diastolic dysfunction. Mildly dilated left atrium. Pulmonary hypertension. Pulmonary arterial systolic pressure is 61 mmHg.  Severely elevated central venous pressure (15+ mmHg); IVC diameter is larger than 21 mm and collapses less than 50% with respiration. SBP in ED elevated  to 140s-170s. Pt on lisinopril 20mg BID and HCTZ 12.5mg daily at home. Pt also on Lasix 20mg daily PRN for lower extremity edema at home. Patient endorsing increased swelling in bilateral upper arms and abdominal. Continued home Lasix 20mg daily, Lisinopril 20mg BID,  HCTZ 12.5 mg daily while admitted.     GERD  Pt takes omeprazole 40mg daily and pepcid for breakthrough symptoms at home. Since Omeprazole was not on hospital formulary pt was on protonix 40mg daily while admitted.     CURRENT ADMISSION IMAGING RESULTS   Xr Chest Port    Result Date: 5/22/2020  Impression:  1. No acute finding. 2. Stable bibasilar streaky opacities, probably scarring or atelectasis. 3. Hyperinflation/emphysema. Cardiology Procedures/Testing:  MODALITY RESULTS   EKG Results for orders placed or performed during the hospital encounter of 05/21/20   EKG, 12 LEAD, INITIAL   Result Value Ref Range    Ventricular Rate 87 BPM    Atrial Rate 87 BPM    P-R Interval 148 ms    QRS Duration 80 ms    Q-T Interval 376 ms    QTC Calculation (Bezet) 452 ms    Calculated P Axis 61 degrees    Calculated R Axis -4 degrees    Calculated T Axis 52 degrees    Diagnosis       Normal sinus rhythm  Cannot rule out Anterior infarct , age undetermined  Abnormal ECG  When compared with ECG of 13-MAY-2020 18:17,  Minimal criteria for Anterior infarct are now present  Confirmed by Dedra Zendejas ([id number]) on 5/23/2020 7:59:23 AM         ECHO 07/06/18   ECHO ADULT COMPLETE 07/07/2018 7/7/2018    Narrative · Definity contrast was given to enhance imaging. · Left ventricular hyperdynamic systolic function. Estimated left   ventricular ejection fraction is 66 - 70%. Left ventricular mild   concentric hypertrophy observed. Normal left ventricular wall motion, no   regional wall motion abnormality noted.  Age-appropriate left ventricular diastolic function. · Right ventricle not well visualized. · Aortic valve is probably trileaflet. Signed by: Cosme Chacon MD      Nuclear Medicine Results from [facility] Encounter encounter on 09/09/13   NM LUNG PERFUSION W VENT    Impression IMPRESSION:    Low probability of pulmonary embolism. Results from [place] encounter on 09/04/13   TRANSCRIBED NUCLEAR MEDICINE   Results from Hospital Encounter encounter on 12/06/12   NM LUNG VENT/PERF    Impression IMPRESSION:    Very low probability for pulmonary embolus. IR No results found for this or any previous visit. CATH       Special Testing/Procedures:  MODALITY RESULTS   MICRO All Micro Results     Procedure Component Value Units Date/Time    CULTURE, BLOOD [[id number]] Collected:  05/22/20 2304    Order Status:  Completed Specimen:  Blood Updated:  05/26/20 0619     Special Requests: NO SPECIAL REQUESTS        Culture result: NO GROWTH 3 DAYS       CULTURE, BLOOD [[id number]] Collected:  05/22/20 2311    Order Status:  Completed Specimen:  Blood Updated:  05/26/20 0619     Special Requests: NO SPECIAL REQUESTS        Culture result: NO GROWTH 3 DAYS            ABG Lab Results   Component Value Date/Time    pH (POC) 7.377 05/21/2020 11:00 PM    pCO2 (POC) 45.3 (H) 05/21/2020 11:00 PM    pO2 (POC) 259 (H) 05/21/2020 11:00 PM    HCO3 (POC) 26.6 (H) 05/21/2020 11:00 PM    FIO2 (POC) 40 05/21/2020 11:00 PM      UA No results found for this or any previous visit.              Laboratory Results:  LABORATORY RESULTS   HEMATOLOGY Lab Results   Component Value Date/Time    WBC 12.1 05/26/2020 12:46 AM    HGB 11.6 (L) 05/26/2020 12:46 AM    HCT 34.0 (L) 05/26/2020 12:46 AM    PLATELET 472 32/58/5290 12:46 AM    MCV 90.7 05/26/2020 12:46 AM       CHEMISTRIES Lab Results   Component Value Date/Time    Sodium 139 05/26/2020 12:46 AM    Potassium 3.6 05/26/2020 12:46 AM    Chloride 104 05/26/2020 12:46 AM    CO2 28 05/26/2020 12:46 AM    Anion gap 7 05/26/2020 12:46 AM    Glucose 224 (H) 05/26/2020 12:46 AM    BUN 23 (H) 05/26/2020 12:46 AM    Creatinine 1.07 05/26/2020 12:46 AM    BUN/Creatinine ratio 21 (H) 05/26/2020 12:46 AM    GFR est AA >60 05/26/2020 12:46 AM    GFR est non-AA 52 (L) 05/26/2020 12:46 AM    Calcium 9.3 05/26/2020 12:46 AM      HEPATIC FUNCTION Lab Results   Component Value Date/Time    Albumin 3.8 05/21/2020 10:30 PM    Bilirubin, direct <0.1 02/08/2017 10:00 PM    Bilirubin, total 0.6 05/21/2020 10:30 PM    Protein, total 7.2 05/21/2020 10:30 PM    Globulin 3.4 05/21/2020 10:30 PM    A-G Ratio 1.1 05/21/2020 10:30 PM    AST (SGOT) 30 05/21/2020 10:30 PM    ALT (SGPT) 50 05/21/2020 10:30 PM    Alk.  phosphatase 84 05/21/2020 10:30 PM       LACTIC ACID Lab Results   Component Value Date/Time    Lactic acid 2.2 (HH) 05/26/2020 05:39 AM    Lactic acid 3.4 (HH) 05/26/2020 12:45 AM    Lactic acid 5.0 (HH) 05/25/2020 06:40 PM      CARDIAC PANEL Lab Results   Component Value Date/Time    CK 81 05/21/2020 10:30 PM    CK - MB <1.0 05/21/2020 10:30 PM    CK-MB Index  05/21/2020 10:30 PM     CALCULATION NOT PERFORMED WHEN RESULT IS BELOW LINEAR LIMIT    Troponin-I, QT <0.02 05/21/2020 10:30 PM      NT-proBNP Lab Results   Component Value Date/Time    NT pro-BNP 64 05/22/2020 04:08 PM    NT pro-BNP 34 05/13/2020 05:46 PM    NT pro- 03/30/2020 06:02 PM    NT pro-BNP 35 03/23/2020 03:13 PM    NT pro- 03/04/2020 06:15 PM      THYROID Lab Results   Component Value Date/Time    TSH 2.76 09/18/2016 02:20 PM      LIPID PANEL Lab Results   Component Value Date/Time    Cholesterol, total 220 (H) 11/20/2012 03:22 AM    HDL Cholesterol 62 (H) 11/20/2012 03:22 AM    LDL, calculated 144.6 (H) 11/20/2012 03:22 AM    VLDL, calculated 13.4 11/20/2012 03:22 AM    Triglyceride 67 11/20/2012 03:22 AM    CHOL/HDL Ratio 3.5 11/20/2012 03:22 AM         RISK CALCULATORS:  SCORE RESULT   ASCVD The ASCVD Risk score (Rafa Farris, et al., 2013) failed to calculate for the following reasons:    ASCVD risk score not calculated    [id number]     HAS-BLED    READMISSION RISK SCORE High Risk            40       Total Score        3 Has Seen PCP in Last 6 Months (Yes=3, No=0)    11 IP Visits Last 12 Months (1-3=4, 4=9, >4=11)    9 Pt. Coverage (Medicare=5 , Medicaid, or Self-Pay=4)    17 Charlson Comorbidity Score (Age + Comorbid Conditions)        Criteria that do not apply:    . Living with Significant Other. Assisted Living. LTAC. SNF. or   Rehab    Patient Length of Stay (>5 days = 3)           Functional status and cognitive function:    Ambulates with: Cane  Status: alert, cooperative, no distress, appears stated age  Condition: STABLE  Disposition: Home    Diet: Diabetic    Code status and advanced care plan: Full    Point of Contact Marty Dietrich  Relationship: Daughter  [phone]       Patient Education:  Patient was educated on the following topics prior to discharge:COPD, Asthma, Air Pollution, Metered-dose inhaler.      Follow-up:   Follow-up Information     Follow up With Specialties Details Why Contact Info    Delta Joseph MD Pulmonary Disease, Internal Medicine On 6/25/2020 @ 2 o,clock virtual visit [address]      Abraham Gil MD  Go on 5/29/2020 Please go to office for hospital follow-up appointment at 3pm on 5/29/20. Skyler Bonds  [phone]            =================================================================  Joanie Mills MD, PGY-1   [address] Medicine   Intern Pager: [phone]   May 26, 2020, 11:51 AM

## 2020-05-23 LAB
ANION GAP SERPL CALC-SCNC: 9 MMOL/L (ref 3–18)
ATRIAL RATE: 87 BPM
BASOPHILS # BLD: 0 K/UL (ref 0–0.1)
BASOPHILS NFR BLD: 0 % (ref 0–2)
BUN SERPL-MCNC: 22 MG/DL (ref 7–18)
BUN/CREAT SERPL: 20 (ref 12–20)
CALCIUM SERPL-MCNC: 9.2 MG/DL (ref 8.5–10.1)
CALCULATED P AXIS, ECG09: 61 DEGREES
CALCULATED R AXIS, ECG10: -4 DEGREES
CALCULATED T AXIS, ECG11: 52 DEGREES
CHLORIDE SERPL-SCNC: 101 MMOL/L (ref 100–111)
CO2 SERPL-SCNC: 26 MMOL/L (ref 21–32)
CREAT SERPL-MCNC: 1.1 MG/DL (ref 0.6–1.3)
DIAGNOSIS, 93000: NORMAL
DIFFERENTIAL METHOD BLD: ABNORMAL
EOSINOPHIL # BLD: 0 K/UL (ref 0–0.4)
EOSINOPHIL NFR BLD: 0 % (ref 0–5)
ERYTHROCYTE [DISTWIDTH] IN BLOOD BY AUTOMATED COUNT: 14.4 % (ref 11.6–14.5)
GLUCOSE BLD STRIP.AUTO-MCNC: 161 MG/DL (ref 70–110)
GLUCOSE BLD STRIP.AUTO-MCNC: 250 MG/DL (ref 70–110)
GLUCOSE BLD STRIP.AUTO-MCNC: 305 MG/DL (ref 70–110)
GLUCOSE BLD STRIP.AUTO-MCNC: 321 MG/DL (ref 70–110)
GLUCOSE SERPL-MCNC: 290 MG/DL (ref 74–99)
HCT VFR BLD AUTO: 34.8 % (ref 35–45)
HGB BLD-MCNC: 11.4 G/DL (ref 12–16)
LACTATE SERPL-SCNC: 3.1 MMOL/L (ref 0.4–2)
LACTATE SERPL-SCNC: 4.8 MMOL/L (ref 0.4–2)
LACTATE SERPL-SCNC: 5.1 MMOL/L (ref 0.4–2)
LACTATE SERPL-SCNC: 5.2 MMOL/L (ref 0.4–2)
LACTATE SERPL-SCNC: 5.7 MMOL/L (ref 0.4–2)
LYMPHOCYTES # BLD: 0.8 K/UL (ref 0.9–3.6)
LYMPHOCYTES NFR BLD: 7 % (ref 21–52)
MCH RBC QN AUTO: 29.6 PG (ref 24–34)
MCHC RBC AUTO-ENTMCNC: 32.8 G/DL (ref 31–37)
MCV RBC AUTO: 90.4 FL (ref 74–97)
MONOCYTES # BLD: 0.4 K/UL (ref 0.05–1.2)
MONOCYTES NFR BLD: 3 % (ref 3–10)
NEUTS SEG # BLD: 11.2 K/UL (ref 1.8–8)
NEUTS SEG NFR BLD: 90 % (ref 40–73)
P-R INTERVAL, ECG05: 148 MS
PLATELET # BLD AUTO: 317 K/UL (ref 135–420)
PMV BLD AUTO: 9.2 FL (ref 9.2–11.8)
POTASSIUM SERPL-SCNC: 4.2 MMOL/L (ref 3.5–5.5)
Q-T INTERVAL, ECG07: 376 MS
QRS DURATION, ECG06: 80 MS
QTC CALCULATION (BEZET), ECG08: 452 MS
RBC # BLD AUTO: 3.85 M/UL (ref 4.2–5.3)
SODIUM SERPL-SCNC: 136 MMOL/L (ref 136–145)
VENTRICULAR RATE, ECG03: 87 BPM
WBC # BLD AUTO: 12.4 K/UL (ref 4.6–13.2)

## 2020-05-23 PROCEDURE — 85025 COMPLETE CBC W/AUTO DIFF WBC: CPT

## 2020-05-23 PROCEDURE — 74011636637 HC RX REV CODE- 636/637: Performed by: STUDENT IN AN ORGANIZED HEALTH CARE EDUCATION/TRAINING PROGRAM

## 2020-05-23 PROCEDURE — 74011636637 HC RX REV CODE- 636/637: Performed by: FAMILY MEDICINE

## 2020-05-23 PROCEDURE — 83605 ASSAY OF LACTIC ACID: CPT

## 2020-05-23 PROCEDURE — 94660 CPAP INITIATION&MGMT: CPT

## 2020-05-23 PROCEDURE — 74011250637 HC RX REV CODE- 250/637: Performed by: STUDENT IN AN ORGANIZED HEALTH CARE EDUCATION/TRAINING PROGRAM

## 2020-05-23 PROCEDURE — 94640 AIRWAY INHALATION TREATMENT: CPT

## 2020-05-23 PROCEDURE — 74011250636 HC RX REV CODE- 250/636: Performed by: STUDENT IN AN ORGANIZED HEALTH CARE EDUCATION/TRAINING PROGRAM

## 2020-05-23 PROCEDURE — 36415 COLL VENOUS BLD VENIPUNCTURE: CPT

## 2020-05-23 PROCEDURE — 74011000250 HC RX REV CODE- 250: Performed by: STUDENT IN AN ORGANIZED HEALTH CARE EDUCATION/TRAINING PROGRAM

## 2020-05-23 PROCEDURE — 94762 N-INVAS EAR/PLS OXIMTRY CONT: CPT

## 2020-05-23 PROCEDURE — 82962 GLUCOSE BLOOD TEST: CPT

## 2020-05-23 PROCEDURE — 80048 BASIC METABOLIC PNL TOTAL CA: CPT

## 2020-05-23 PROCEDURE — 77010033678 HC OXYGEN DAILY

## 2020-05-23 PROCEDURE — 65660000004 HC RM CVT STEPDOWN

## 2020-05-23 RX ADMIN — HEPARIN SODIUM 5000 UNITS: 5000 INJECTION INTRAVENOUS; SUBCUTANEOUS at 14:22

## 2020-05-23 RX ADMIN — INSULIN GLARGINE 35 UNITS: 100 INJECTION, SOLUTION SUBCUTANEOUS at 22:03

## 2020-05-23 RX ADMIN — MONTELUKAST 10 MG: 10 TABLET, FILM COATED ORAL at 22:04

## 2020-05-23 RX ADMIN — BUDESONIDE 1000 MCG: 1 SUSPENSION RESPIRATORY (INHALATION) at 07:13

## 2020-05-23 RX ADMIN — LISINOPRIL 20 MG: 20 TABLET ORAL at 09:06

## 2020-05-23 RX ADMIN — IPRATROPIUM BROMIDE AND ALBUTEROL SULFATE 3 ML: .5; 3 SOLUTION RESPIRATORY (INHALATION) at 15:11

## 2020-05-23 RX ADMIN — IPRATROPIUM BROMIDE AND ALBUTEROL SULFATE 3 ML: .5; 3 SOLUTION RESPIRATORY (INHALATION) at 11:37

## 2020-05-23 RX ADMIN — METHYLPREDNISOLONE SODIUM SUCCINATE 40 MG: 40 INJECTION, POWDER, FOR SOLUTION INTRAMUSCULAR; INTRAVENOUS at 07:28

## 2020-05-23 RX ADMIN — IPRATROPIUM BROMIDE AND ALBUTEROL SULFATE 3 ML: .5; 3 SOLUTION RESPIRATORY (INHALATION) at 07:13

## 2020-05-23 RX ADMIN — METHYLPREDNISOLONE SODIUM SUCCINATE 40 MG: 40 INJECTION, POWDER, FOR SOLUTION INTRAMUSCULAR; INTRAVENOUS at 22:03

## 2020-05-23 RX ADMIN — PANTOPRAZOLE SODIUM 40 MG: 40 TABLET, DELAYED RELEASE ORAL at 09:06

## 2020-05-23 RX ADMIN — INSULIN LISPRO 12 UNITS: 100 INJECTION, SOLUTION INTRAVENOUS; SUBCUTANEOUS at 22:04

## 2020-05-23 RX ADMIN — INSULIN LISPRO 12 UNITS: 100 INJECTION, SOLUTION INTRAVENOUS; SUBCUTANEOUS at 11:30

## 2020-05-23 RX ADMIN — FLUTICASONE PROPIONATE 2 SPRAY: 50 SPRAY, METERED NASAL at 09:06

## 2020-05-23 RX ADMIN — HYDROCHLOROTHIAZIDE 12.5 MG: 12.5 CAPSULE ORAL at 09:06

## 2020-05-23 RX ADMIN — SODIUM CHLORIDE 150 ML/HR: 900 INJECTION, SOLUTION INTRAVENOUS at 14:28

## 2020-05-23 RX ADMIN — IPRATROPIUM BROMIDE AND ALBUTEROL SULFATE 3 ML: .5; 3 SOLUTION RESPIRATORY (INHALATION) at 00:00

## 2020-05-23 RX ADMIN — INSULIN LISPRO 3 UNITS: 100 INJECTION, SOLUTION INTRAVENOUS; SUBCUTANEOUS at 16:52

## 2020-05-23 RX ADMIN — SODIUM CHLORIDE 150 ML/HR: 900 INJECTION, SOLUTION INTRAVENOUS at 21:08

## 2020-05-23 RX ADMIN — INSULIN LISPRO 9 UNITS: 100 INJECTION, SOLUTION INTRAVENOUS; SUBCUTANEOUS at 09:05

## 2020-05-23 RX ADMIN — HEPARIN SODIUM 5000 UNITS: 5000 INJECTION INTRAVENOUS; SUBCUTANEOUS at 07:28

## 2020-05-23 RX ADMIN — SODIUM CHLORIDE 150 ML/HR: 900 INJECTION, SOLUTION INTRAVENOUS at 07:44

## 2020-05-23 RX ADMIN — IPRATROPIUM BROMIDE AND ALBUTEROL SULFATE 3 ML: .5; 3 SOLUTION RESPIRATORY (INHALATION) at 19:47

## 2020-05-23 RX ADMIN — IPRATROPIUM BROMIDE AND ALBUTEROL SULFATE 3 ML: .5; 3 SOLUTION RESPIRATORY (INHALATION) at 04:00

## 2020-05-23 RX ADMIN — ASPIRIN 81 MG: 81 TABLET, COATED ORAL at 09:06

## 2020-05-23 RX ADMIN — BUDESONIDE 1000 MCG: 1 SUSPENSION RESPIRATORY (INHALATION) at 19:47

## 2020-05-23 RX ADMIN — METHYLPREDNISOLONE SODIUM SUCCINATE 40 MG: 40 INJECTION, POWDER, FOR SOLUTION INTRAMUSCULAR; INTRAVENOUS at 14:22

## 2020-05-23 RX ADMIN — FUROSEMIDE 20 MG: 20 TABLET ORAL at 09:06

## 2020-05-23 RX ADMIN — HEPARIN SODIUM 5000 UNITS: 5000 INJECTION INTRAVENOUS; SUBCUTANEOUS at 22:03

## 2020-05-23 NOTE — ROUTINE PROCESS
1930 bedside report given to me by RN Claudette Holm. Pt is sitting up in bed on room air, denies pain and denies shortness of breath, vitals are WNL  2000 vitals assessed, bedside commode emptied, pt talking on phone with her daughter, given ice water, IV assessed  2145 JOSÉ MIGUEL KELLY [employer] assessed blood glucose level and assessed patient's needs  2200 meds and insulin given, pt talking on the phone, she hung up and we discussed asthma and COPD. Discussed avoiding outdoors during season changes and windy days to avoid pollution which can irritate patients with these conditions breathing.          0350 laboratory phoned, lactic acid 5.1     0730 bedside turnover given to JOSÉ MIGUEL _Brigid, MAR, ED summary, updates r/t lactic acid and a chance to ask questions given.

## 2020-05-23 NOTE — PROGRESS NOTES
Problem: Falls - Risk of  Goal: *Absence of Falls  Description: Document Светлана Ross Fall Risk and appropriate interventions in the flowsheet. Outcome: Progressing Towards Goal  Note: Fall Risk Interventions:            Medication Interventions: Patient to call before getting OOB, Evaluate medications/consider consulting pharmacy, Teach patient to arise slowly, Assess postural VS orthostatic hypotension                   Problem: Patient Education: Go to Patient Education Activity  Goal: Patient/Family Education  Outcome: Progressing Towards Goal     Problem: Diabetes Self-Management  Goal: *Disease process and treatment process  Description: Define diabetes and identify own type of diabetes; list 3 options for treating diabetes. Outcome: Progressing Towards Goal  Goal: *Incorporating nutritional management into lifestyle  Description: Describe effect of type, amount and timing of food on blood glucose; list 3 methods for planning meals. Outcome: Progressing Towards Goal  Goal: *Incorporating physical activity into lifestyle  Description: State effect of exercise on blood glucose levels. Outcome: Progressing Towards Goal  Goal: *Developing strategies to promote health/change behavior  Description: Define the ABC's of diabetes; identify appropriate screenings, schedule and personal plan for screenings. Outcome: Progressing Towards Goal  Goal: *Using medications safely  Description: State effect of diabetes medications on diabetes; name diabetes medication taking, action and side effects. Outcome: Progressing Towards Goal  Goal: *Monitoring blood glucose, interpreting and using results  Description: Identify recommended blood glucose targets  and personal targets. Outcome: Progressing Towards Goal  Goal: *Prevention, detection, treatment of acute complications  Description: List symptoms of hyper- and hypoglycemia; describe how to treat low blood sugar and actions for lowering  high blood glucose level.   Outcome: Progressing Towards Goal  Goal: *Prevention, detection and treatment of chronic complications  Description: Define the natural course of diabetes and describe the relationship of blood glucose levels to long term complications of diabetes.   Outcome: Progressing Towards Goal  Goal: *Developing strategies to address psychosocial issues  Description: Describe feelings about living with diabetes; identify support needed and support network  Outcome: Progressing Towards Goal  Goal: *Insulin pump training  Outcome: Progressing Towards Goal  Goal: *Sick day guidelines  Outcome: Progressing Towards Goal  Goal: *Patient Specific Goal (EDIT GOAL, INSERT TEXT)  Outcome: Progressing Towards Goal     Problem: Patient Education: Go to Patient Education Activity  Goal: Patient/Family Education  Outcome: Progressing Towards Goal     Problem: Patient Education: Go to Patient Education Activity  Goal: Patient/Family Education  Outcome: Progressing Towards Goal     Problem: Patient Education: Go to Patient Education Activity  Goal: Patient/Family Education  Outcome: Progressing Towards Goal

## 2020-05-23 NOTE — PROGRESS NOTES
Bedside shift change report given to Dimple Newsome. Report included the following information SBAR, Kardex, Intake/Output, MAR, Recent Results and Med Rec Status. 3282: Assumed care of pt from SONI Marino RN  0328: Fingerstick blood sugar 250  0905: given 9 units of sliding scale insulin coverage  0956am per patient request gave two EBT cards, [facility] and a [facility] card that belongs to patient to her daughter Noris Magana  2429: fingerstick blood sugar 305  1130: covered with 12 units of Sliding scale insulin     11:55 Evon from lab called and reported a lactic acid of 5.2    1200 lactic acid of 5.2 reported to [facility], [address], [place]: finger stick was 161, sliding scale given as ordered  Dejah Urias 58 from lab reported that the lactic acid drawn at 1541 was 3.1.  1740 Dr Tonio Cope notified of the 3.1 lactic acid result NNO.

## 2020-05-23 NOTE — PROGRESS NOTES
[address] [facility]  Intern Progress Note    Patient: Trixie Corbin MRN: [id number]   SSN: [id number]  YOB: 1960   Age: 60 y.o. Sex: female      Admit Date: 5/21/2020    LOS: 1 day   Chief Complaint   Patient presents with    Shortness of Breath    COPD       Subjective:     Patient doing well this morning. States she feels like her breathing is improving. Wearing Bipap, speaking in full sentences. No accessory muscle use. ROS:   Denies:   - fevers/chill  - headache  - CP  - N/V abdominal pain     Objective:     PE:  - General: patient is in no acute distress  - Cv: RRR, no murmurs/gallops/rubs, peripheral pulses intact  - Resp: Lungs with poor air entry and scattered expiratory wheezes in bilateral bases   - Abdominal: Soft, non-tender, non-distended, Bs+, obese  - MSK: trace swelling in extermities     Vitals:   Patient Vitals for the past 24 hrs:   Temp Pulse Resp BP SpO2   05/23/20 0903 98.6 °F (37 °C) 84 20 138/63 --   05/23/20 0715 -- -- -- -- 100 %   05/23/20 0339 98.3 °F (36.8 °C) 73 20 138/59 98 %   05/23/20 0326 -- 76 18 -- 99 %   05/23/20 0104 -- 87 23 -- 97 %   05/23/20 0042 -- 88 21 -- 97 %   05/23/20 0000 98.4 °F (36.9 °C) 87 22 142/67 96 %   05/22/20 2300 -- 85 24 130/53 93 %   05/22/20 2200 -- 95 15 123/55 --   05/22/20 2125 -- 94 22 -- 91 %   05/22/20 2100 -- (!) 107 17 155/63 95 %   05/22/20 2000 -- 89 23 120/57 95 %   05/22/20 1938 -- -- -- -- 95 %   05/22/20 1935 -- 86 19 108/47 94 %   05/22/20 1928 98.1 °F (36.7 °C) 88 21 108/47 95 %   05/22/20 1752 -- -- -- -- 96 %   05/22/20 1555 97.7 °F (36.5 °C) 79 21 127/68 99 %   05/22/20 1554 -- -- -- -- 98 %   05/22/20 1234 -- -- -- -- 97 %   05/22/20 1108 97.4 °F (36.3 °C) 85 19 142/67 100 %         Intake/Output Summary (Last 24 hours) at 5/23/2020 0952  Last data filed at 5/23/2020 0748  Gross per 24 hour   Intake 1602.75 ml   Output 2300 ml   Net -697.25 ml       Labs reviewed.    Pertinent labs: WBC 12.4, Glucose 290, Cr 1.1, LA 5.1   Pertinent studies: Echo pending, blood cultures pending        Assessment/Plan:   Lauri De Dios is a 60 y.o. female with PMH of COPD on home O2, IDDM2, HTN, HFpEF, SHERRIE on CPAP, GERD, allergic rhinitis, now admitted for COPD vs asthma exacerbation.      Acute on chronic hypoxic respiratory failure likely secondary to COPD vs asthma Exacerbation   Likely 2/2 to environmental trigger vs infection. Infection less likely since pt with no URI symptoms, no fever or leukoysis. Patient tachypnic in the ED, placed on bipap, received duonebx4, mag, IV solumedrol 125mg in the ED. ABG: pH 7.377, pCO2 45.5, pO2 259, HCO3 26.6 (on bipap). CXR: no acute findings, hyperinflation. Patient is on 2L O2 at home. Follows Dr. Amber Villarreal in outpatient. This is her fifth admission in 2020 for COPD, most recent 4/8/20-4/11/20. Improving this morning. Speaking in full sentences on room air  - Pulmonology following, appreciate recs  - Continue Bipap at night and as needed   - oxygen as needed, goal 88-92%  - Duonebs q4hrs scheduled  - Pulmicort nebs 1mg BID  - Albuterol PRN    - Incentive spirometry  - Continue home loratadine, flonase and singular  - Steroid taper per pulm- currently 40mg Solumedrol q8hrs. Will have extended taper for 6-8 weeks on discharge  - Continue Azithromycin 250mg x 5 days  - Aspiration precautions  - PT/OT  - FU outpatient with pulmonology for allergy workup     Lactic acidosis- 5.6 on admission, max 5.7 yesterday evening, 5.1 this morning. No s/sx's of sepsis or infection, but will work up. Has had persistently elevated lactic acid in past admissions. - continue IVF NS@ 150ml/hr   - trend LA q4hrs    - FU blood cultures/UA with reflex urine culture     SHERRIE: Intolerant to Cpap. On trilogy machine nightly at home.   - BIPAP qhs in place home trilogy  - FU echo to evaluate for pHTN     Insulin dependent Type 2 Diabetes  Home lantus 35u pm and novolog SSI.  BS have been in the 200's- 300's. On high dose steroids, which will also contribute to her hyperglycemia  -Continue Lantus 35U  -SSI  -Accuchecks ACHS  - diabetic diet     Hypertension, HFpEF  Stable. Echo 7/7/18 EF 66-70%, LV mild concentric hypertrophy, No left regional wall motion abnorm. SBP in ED elevated  to 140s-170s. Pt on lisinopril 20mg BID and HCTZ 12.5mg daily at home. Pt also on Lasix 20mg daily PRN for lower extremity edema at home.  Patient endorsing increased swelling in bilateral upper arms and abdominal.   -Continue home Lasix 20mg daily  -Continue Lisinopril 20mg BID  -Continue HCTZ 12.5 mg daily     GERD  Pt takes omeprazole 40mg daily and pepcid for breakthrough symptoms at home.   -Continue protonix 40mg daily    Morbid obesity- BMI 46  - nutritional counselling    Diet Diabetic   DVT Prophylaxis SQH   GI Prophylaxis Protonix   Code status Full   Disposition Stepdown>2MN      Point of Contact Vandana Wan  Relationship: Daughter  [phone]         Signed By: Cinthia Lancaster MD     May 23, 2020

## 2020-05-24 ENCOUNTER — APPOINTMENT (OUTPATIENT)
Dept: NON INVASIVE DIAGNOSTICS | Age: 60
DRG: 189 | End: 2020-05-24
Attending: INTERNAL MEDICINE
Payer: MEDICARE

## 2020-05-24 LAB
ANION GAP SERPL CALC-SCNC: 7 MMOL/L (ref 3–18)
APPEARANCE UR: CLEAR
BACTERIA URNS QL MICRO: ABNORMAL /HPF
BASOPHILS # BLD: 0 K/UL (ref 0–0.06)
BASOPHILS NFR BLD: 0 % (ref 0–3)
BILIRUB UR QL: NEGATIVE
BUN SERPL-MCNC: 19 MG/DL (ref 7–18)
BUN/CREAT SERPL: 19 (ref 12–20)
CALCIUM SERPL-MCNC: 8.9 MG/DL (ref 8.5–10.1)
CHLORIDE SERPL-SCNC: 105 MMOL/L (ref 100–111)
CO2 SERPL-SCNC: 27 MMOL/L (ref 21–32)
COLOR UR: YELLOW
CREAT SERPL-MCNC: 1.01 MG/DL (ref 0.6–1.3)
DIFFERENTIAL METHOD BLD: ABNORMAL
EOSINOPHIL # BLD: 0 K/UL (ref 0–0.4)
EOSINOPHIL NFR BLD: 0 % (ref 0–5)
EPITH CASTS URNS QL MICRO: ABNORMAL /LPF (ref 0–5)
ERYTHROCYTE [DISTWIDTH] IN BLOOD BY AUTOMATED COUNT: 14.4 % (ref 11.6–14.5)
GLUCOSE BLD STRIP.AUTO-MCNC: 216 MG/DL (ref 70–110)
GLUCOSE BLD STRIP.AUTO-MCNC: 255 MG/DL (ref 70–110)
GLUCOSE BLD STRIP.AUTO-MCNC: 289 MG/DL (ref 70–110)
GLUCOSE BLD STRIP.AUTO-MCNC: 318 MG/DL (ref 70–110)
GLUCOSE SERPL-MCNC: 254 MG/DL (ref 74–99)
GLUCOSE UR STRIP.AUTO-MCNC: >1000 MG/DL
HCT VFR BLD AUTO: 34.1 % (ref 35–45)
HGB BLD-MCNC: 11.5 G/DL (ref 12–16)
HGB UR QL STRIP: NEGATIVE
KETONES UR QL STRIP.AUTO: NEGATIVE MG/DL
LACTATE SERPL-SCNC: 3.6 MMOL/L (ref 0.4–2)
LACTATE SERPL-SCNC: 3.8 MMOL/L (ref 0.4–2)
LACTATE SERPL-SCNC: 4.3 MMOL/L (ref 0.4–2)
LACTATE SERPL-SCNC: 5 MMOL/L (ref 0.4–2)
LACTATE SERPL-SCNC: 6.6 MMOL/L (ref 0.4–2)
LEUKOCYTE ESTERASE UR QL STRIP.AUTO: NEGATIVE
LYMPHOCYTES # BLD: 1.2 K/UL (ref 0.8–3.5)
LYMPHOCYTES NFR BLD: 11 % (ref 20–51)
MCH RBC QN AUTO: 30.8 PG (ref 24–34)
MCHC RBC AUTO-ENTMCNC: 33.7 G/DL (ref 31–37)
MCV RBC AUTO: 91.4 FL (ref 74–97)
MONOCYTES # BLD: 0.3 K/UL (ref 0–1)
MONOCYTES NFR BLD: 3 % (ref 2–9)
NEUTS BAND NFR BLD MANUAL: 3 % (ref 0–5)
NEUTS SEG # BLD: 9.2 K/UL (ref 1.8–8)
NEUTS SEG NFR BLD: 83 % (ref 42–75)
NITRITE UR QL STRIP.AUTO: NEGATIVE
PH UR STRIP: 5 [PH] (ref 5–8)
PLATELET # BLD AUTO: 316 K/UL (ref 135–420)
PLATELET COMMENTS,PCOM: ABNORMAL
PMV BLD AUTO: 9.2 FL (ref 9.2–11.8)
POTASSIUM SERPL-SCNC: 4.4 MMOL/L (ref 3.5–5.5)
PROT UR STRIP-MCNC: NEGATIVE MG/DL
RBC # BLD AUTO: 3.73 M/UL (ref 4.2–5.3)
RBC #/AREA URNS HPF: NEGATIVE /HPF (ref 0–5)
RBC MORPH BLD: ABNORMAL
SODIUM SERPL-SCNC: 139 MMOL/L (ref 136–145)
SP GR UR REFRACTOMETRY: 1.01 (ref 1–1.03)
UROBILINOGEN UR QL STRIP.AUTO: 0.2 EU/DL (ref 0.2–1)
WBC # BLD AUTO: 10.7 K/UL (ref 4.6–13.2)
WBC URNS QL MICRO: ABNORMAL /HPF (ref 0–4)

## 2020-05-24 PROCEDURE — 83605 ASSAY OF LACTIC ACID: CPT

## 2020-05-24 PROCEDURE — 74011636637 HC RX REV CODE- 636/637: Performed by: FAMILY MEDICINE

## 2020-05-24 PROCEDURE — 74011250636 HC RX REV CODE- 250/636: Performed by: STUDENT IN AN ORGANIZED HEALTH CARE EDUCATION/TRAINING PROGRAM

## 2020-05-24 PROCEDURE — 74011000250 HC RX REV CODE- 250: Performed by: STUDENT IN AN ORGANIZED HEALTH CARE EDUCATION/TRAINING PROGRAM

## 2020-05-24 PROCEDURE — 74011636637 HC RX REV CODE- 636/637: Performed by: STUDENT IN AN ORGANIZED HEALTH CARE EDUCATION/TRAINING PROGRAM

## 2020-05-24 PROCEDURE — 94762 N-INVAS EAR/PLS OXIMTRY CONT: CPT

## 2020-05-24 PROCEDURE — 94640 AIRWAY INHALATION TREATMENT: CPT

## 2020-05-24 PROCEDURE — 81001 URINALYSIS AUTO W/SCOPE: CPT

## 2020-05-24 PROCEDURE — 94660 CPAP INITIATION&MGMT: CPT

## 2020-05-24 PROCEDURE — 74011250637 HC RX REV CODE- 250/637: Performed by: STUDENT IN AN ORGANIZED HEALTH CARE EDUCATION/TRAINING PROGRAM

## 2020-05-24 PROCEDURE — 74011250636 HC RX REV CODE- 250/636: Performed by: FAMILY MEDICINE

## 2020-05-24 PROCEDURE — 85025 COMPLETE CBC W/AUTO DIFF WBC: CPT

## 2020-05-24 PROCEDURE — 80048 BASIC METABOLIC PNL TOTAL CA: CPT

## 2020-05-24 PROCEDURE — 82962 GLUCOSE BLOOD TEST: CPT

## 2020-05-24 PROCEDURE — C8929 TTE W OR WO FOL WCON,DOPPLER: HCPCS

## 2020-05-24 PROCEDURE — 36415 COLL VENOUS BLD VENIPUNCTURE: CPT

## 2020-05-24 PROCEDURE — 65660000004 HC RM CVT STEPDOWN

## 2020-05-24 RX ADMIN — SODIUM CHLORIDE 150 ML/HR: 900 INJECTION, SOLUTION INTRAVENOUS at 19:07

## 2020-05-24 RX ADMIN — INSULIN LISPRO 12 UNITS: 100 INJECTION, SOLUTION INTRAVENOUS; SUBCUTANEOUS at 11:46

## 2020-05-24 RX ADMIN — METHYLPREDNISOLONE SODIUM SUCCINATE 40 MG: 40 INJECTION, POWDER, FOR SOLUTION INTRAMUSCULAR; INTRAVENOUS at 21:09

## 2020-05-24 RX ADMIN — LISINOPRIL 20 MG: 20 TABLET ORAL at 08:31

## 2020-05-24 RX ADMIN — BUDESONIDE 1000 MCG: 1 SUSPENSION RESPIRATORY (INHALATION) at 21:08

## 2020-05-24 RX ADMIN — INSULIN LISPRO 9 UNITS: 100 INJECTION, SOLUTION INTRAVENOUS; SUBCUTANEOUS at 08:30

## 2020-05-24 RX ADMIN — MONTELUKAST 10 MG: 10 TABLET, FILM COATED ORAL at 21:09

## 2020-05-24 RX ADMIN — BUDESONIDE 1000 MCG: 1 SUSPENSION RESPIRATORY (INHALATION) at 07:14

## 2020-05-24 RX ADMIN — PANTOPRAZOLE SODIUM 40 MG: 40 TABLET, DELAYED RELEASE ORAL at 08:31

## 2020-05-24 RX ADMIN — HEPARIN SODIUM 5000 UNITS: 5000 INJECTION INTRAVENOUS; SUBCUTANEOUS at 14:29

## 2020-05-24 RX ADMIN — INSULIN GLARGINE 35 UNITS: 100 INJECTION, SOLUTION SUBCUTANEOUS at 21:09

## 2020-05-24 RX ADMIN — FLUTICASONE PROPIONATE 2 SPRAY: 50 SPRAY, METERED NASAL at 08:31

## 2020-05-24 RX ADMIN — SODIUM CHLORIDE 150 ML/HR: 900 INJECTION, SOLUTION INTRAVENOUS at 04:55

## 2020-05-24 RX ADMIN — METHYLPREDNISOLONE SODIUM SUCCINATE 40 MG: 40 INJECTION, POWDER, FOR SOLUTION INTRAMUSCULAR; INTRAVENOUS at 14:29

## 2020-05-24 RX ADMIN — IPRATROPIUM BROMIDE AND ALBUTEROL SULFATE 3 ML: .5; 3 SOLUTION RESPIRATORY (INHALATION) at 07:13

## 2020-05-24 RX ADMIN — HEPARIN SODIUM 5000 UNITS: 5000 INJECTION INTRAVENOUS; SUBCUTANEOUS at 21:09

## 2020-05-24 RX ADMIN — INSULIN LISPRO 6 UNITS: 100 INJECTION, SOLUTION INTRAVENOUS; SUBCUTANEOUS at 17:21

## 2020-05-24 RX ADMIN — INSULIN LISPRO 9 UNITS: 100 INJECTION, SOLUTION INTRAVENOUS; SUBCUTANEOUS at 21:09

## 2020-05-24 RX ADMIN — IPRATROPIUM BROMIDE AND ALBUTEROL SULFATE 3 ML: .5; 3 SOLUTION RESPIRATORY (INHALATION) at 21:08

## 2020-05-24 RX ADMIN — ASPIRIN 81 MG: 81 TABLET, COATED ORAL at 08:31

## 2020-05-24 RX ADMIN — HYDROCHLOROTHIAZIDE 12.5 MG: 12.5 CAPSULE ORAL at 08:31

## 2020-05-24 RX ADMIN — HEPARIN SODIUM 5000 UNITS: 5000 INJECTION INTRAVENOUS; SUBCUTANEOUS at 05:05

## 2020-05-24 RX ADMIN — IPRATROPIUM BROMIDE AND ALBUTEROL SULFATE 3 ML: .5; 3 SOLUTION RESPIRATORY (INHALATION) at 12:35

## 2020-05-24 RX ADMIN — IPRATROPIUM BROMIDE AND ALBUTEROL SULFATE 3 ML: .5; 3 SOLUTION RESPIRATORY (INHALATION) at 00:40

## 2020-05-24 RX ADMIN — SODIUM CHLORIDE 150 ML/HR: 900 INJECTION, SOLUTION INTRAVENOUS at 12:14

## 2020-05-24 RX ADMIN — PERFLUTREN 2 ML: 6.52 INJECTION, SUSPENSION INTRAVENOUS at 12:17

## 2020-05-24 RX ADMIN — METHYLPREDNISOLONE SODIUM SUCCINATE 40 MG: 40 INJECTION, POWDER, FOR SOLUTION INTRAMUSCULAR; INTRAVENOUS at 05:10

## 2020-05-24 RX ADMIN — IPRATROPIUM BROMIDE AND ALBUTEROL SULFATE 3 ML: .5; 3 SOLUTION RESPIRATORY (INHALATION) at 16:15

## 2020-05-24 RX ADMIN — FUROSEMIDE 20 MG: 20 TABLET ORAL at 08:31

## 2020-05-24 RX ADMIN — IPRATROPIUM BROMIDE AND ALBUTEROL SULFATE 3 ML: .5; 3 SOLUTION RESPIRATORY (INHALATION) at 04:30

## 2020-05-24 NOTE — PROGRESS NOTES
Progress Note    Patient: Winter Durham MRN: [id number]  SSN: [id number]    YOB: 1960  Age: 60 y.o. Sex: female      Admit Date: 5/21/2020    LOS: 2 days     Subjective:     Pt taking breathing treatment this AM. No issues overnight. Review of Systems   Constitutional: Negative for chills and fever. HENT: Negative for congestion and sore throat. Eyes: Negative for blurred vision. Respiratory: Positive for cough and shortness of breath. Cardiovascular: Negative for chest pain and palpitations. Gastrointestinal: Positive for abdominal pain. Negative for constipation, diarrhea, nausea and vomiting. Musculoskeletal: Negative for back pain and joint pain. Neurological: Negative for dizziness. Objective:     Vitals:    05/24/20 0000 05/24/20 0456 05/24/20 0715 05/24/20 0727   BP: 122/63   140/74   Pulse: 66   73   Resp: 15   19   Temp: 97.6 °F (36.4 °C)   (!) 96.5 °F (35.8 °C)   SpO2: 100%  98% 99%   Weight:  117.5 kg (259 lb 1.6 oz)     Height:            Intake and Output:  Current Shift: No intake/output data recorded. Last three shifts: 05/22 1901 - 05/24 0700  In: 4092.5 [P.O.:720; I.V.:3372.5]  Out: 2650 [Urine:2650]    Physical Exam:   General:  Alert and responsive, getting breathing treatment  CV:  RRR, no murmurs/rubs/gallops. Resp: Decrease air movent throughout bilateral posterior lung fields. Abd:  Soft, obese, diffusly tender in lower abdomen. No rebound or guarding. Reducible umbilical hernia, BS+  Neuro: A+Ox3. Ext:  2+ radial and dp pulses bilaterally. Trace edema in Lower ext. Skin:  No rashes, lesions, or ulcers. Good turgor.     Lab/Data Review:  Recent Results (from the past 24 hour(s))   GLUCOSE, POC    Collection Time: 05/23/20  8:09 AM   Result Value Ref Range    Glucose (POC) 250 (H) 70 - 110 mg/dL   LACTIC ACID    Collection Time: 05/23/20 11:15 AM   Result Value Ref Range    Lactic acid 5.2 (HH) 0.4 - 2.0 MMOL/L   GLUCOSE, POC Collection Time: 05/23/20 11:16 AM   Result Value Ref Range    Glucose (POC) 305 (H) 70 - 110 mg/dL   LACTIC ACID    Collection Time: 05/23/20  3:41 PM   Result Value Ref Range    Lactic acid 3.1 (HH) 0.4 - 2.0 MMOL/L   GLUCOSE, POC    Collection Time: 05/23/20  4:35 PM   Result Value Ref Range    Glucose (POC) 161 (H) 70 - 110 mg/dL   LACTIC ACID    Collection Time: 05/23/20  7:40 PM   Result Value Ref Range    Lactic acid 4.8 (HH) 0.4 - 2.0 MMOL/L   GLUCOSE, POC    Collection Time: 05/23/20  9:54 PM   Result Value Ref Range    Glucose (POC) 321 (H) 70 - 110 mg/dL   CBC WITH AUTOMATED DIFF    Collection Time: 05/24/20 12:40 AM   Result Value Ref Range    WBC 10.7 4.6 - 13.2 K/uL    RBC 3.73 (L) 4.20 - 5.30 M/uL    HGB 11.5 (L) 12.0 - 16.0 g/dL    HCT 34.1 (L) 35.0 - 45.0 %    MCV 91.4 74.0 - 97.0 FL    MCH 30.8 24.0 - 34.0 PG    MCHC 33.7 31.0 - 37.0 g/dL    RDW 14.4 11.6 - 14.5 %    PLATELET 490 791 - 443 K/uL    MPV 9.2 9.2 - 11.8 FL    NEUTROPHILS 83 (H) 42 - 75 %    BAND NEUTROPHILS 3 0 - 5 %    LYMPHOCYTES 11 (L) 20 - 51 %    MONOCYTES 3 2 - 9 %    EOSINOPHILS 0 0 - 5 %    BASOPHILS 0 0 - 3 %    ABS. NEUTROPHILS 9.2 (H) 1.8 - 8.0 K/UL    ABS. LYMPHOCYTES 1.2 0.8 - 3.5 K/UL    ABS. MONOCYTES 0.3 0 - 1.0 K/UL    ABS. EOSINOPHILS 0.0 0.0 - 0.4 K/UL    ABS.  BASOPHILS 0.0 0.0 - 0.06 K/UL    DF AUTOMATED      PLATELET COMMENTS ADEQUATE PLATELETS      RBC COMMENTS NORMOCYTIC, NORMOCHROMIC     LACTIC ACID    Collection Time: 05/24/20 12:40 AM   Result Value Ref Range    Lactic acid 4.3 (HH) 0.4 - 2.0 MMOL/L   METABOLIC PANEL, BASIC    Collection Time: 05/24/20 12:40 AM   Result Value Ref Range    Sodium 139 136 - 145 mmol/L    Potassium 4.4 3.5 - 5.5 mmol/L    Chloride 105 100 - 111 mmol/L    CO2 27 21 - 32 mmol/L    Anion gap 7 3.0 - 18 mmol/L    Glucose 254 (H) 74 - 99 mg/dL    BUN 19 (H) 7.0 - 18 MG/DL    Creatinine 1.01 0.6 - 1.3 MG/DL    BUN/Creatinine ratio 19 12 - 20      GFR est AA >60 >60 ml/min/1.73m2    GFR est non-AA 56 (L) >60 ml/min/1.73m2    Calcium 8.9 8.5 - 10.1 MG/DL   LACTIC ACID    Collection Time: 05/24/20  5:29 AM   Result Value Ref Range    Lactic acid 3.8 (HH) 0.4 - 2.0 MMOL/L   GLUCOSE, POC    Collection Time: 05/24/20  7:30 AM   Result Value Ref Range    Glucose (POC) 255 (H) 70 - 110 mg/dL     Xr Chest Port    Result Date: 5/22/2020  Impression:  1. No acute finding. 2. Stable bibasilar streaky opacities, probably scarring or atelectasis. 3. Hyperinflation/emphysema. Assessment/Plan:   Douglas De Dios is a 60 y.o. female with PMH of COPD on home O2, IDDM2, HTN, HFpEF, SHERRIE on CPAP, GERD, allergic rhinitis, now admitted for COPD vs asthma exacerbation.      Acute on chronic hypoxic respiratory failure likely secondary to Asthma/COPD Exacerbation   Likely 2/2 to environmental trigger vs infection. Infection less likely since pt with no URI symptoms, no fever or leukoysis. Patient tachypnic in the ED, placed on bipap, received duonebx4, mag, IV solumedrol 125mg in the ED. ABG: pH 7.377, pCO2 45.5, pO2 259, HCO3 26.6 (on bipap). CXR: no acute findings, hyperinflation. Patient is on 2L O2 at home. Follows Dr. David Arora in outpatient. This is her fifth admission in 2020 for COPD, most recent 4/8/20-4/11/20. Per Dr. Inga Agustin, this admission, pt has strong asthma component given significant bronchospasm to a wide variety of classic environmental triggers (I.e. fumes, fragrances, chemicals, smoke, mold, etc).  Improving this morning. Speaking in full sentences on room air  - Pulmonology following, appreciate recs  - Continue Bipap at night and as needed   - oxygen as needed, goal 88-92%  - Duonebs q4hrs scheduled  - Pulmicort nebs 1mg BID  - Albuterol PRN    - Incentive spirometry  - Continue home loratadine, flonase and singular  - Steroid taper per pulm- currently 40mg Solumedrol q8hrs.  Will have extended taper for 6-8 weeks on discharge  - Continue Azithromycin 250mg x 5 days  - Aspiration precautions  - PT/OT  - FU outpatient with pulmonology for allergy workup(Flovent HFA 200mcg 2 puffs BID (or [employer]CS formulary alternative) when pt discharged)     Lactic acidosis- 5.6 on admission. . No s/sx's of sepsis or infection, but will work up. Has had persistently elevated lactic acid in past admissions. - continue IVF NS@ 150ml/hr   - trend LA q4hrs    - FU blood cultures/UA with reflex urine culture     SHERRIE: Intolerant to Cpap. On trilogy machine nightly at home.   - BIPAP qhs in place home trilogy  - FU echo to evaluate for pHTN     Insulin dependent Type 2 Diabetes  Home lantus 35u pm and novolog SSI. BS have been in the 200's- 300's. On high dose steroids, which will also contribute to her hyperglycemia  -Continue Lantus 35U  -SSI  -Accuchecks ACHS  - diabetic diet     Hypertension, HFpEF  Stable. Echo 7/7/18 EF 66-70%, LV mild concentric hypertrophy, No left regional wall motion abnorm. SBP in ED elevated  to 140s-170s.  Pt on lisinopril 20mg BID and HCTZ 12.5mg daily at home. Pt also on Lasix 20mg daily PRN for lower extremity edema at home. Patient endorsing increased swelling in bilateral upper arms and abdominal.   -Continue home Lasix 20mg daily  -Continue Lisinopril 20mg BID  -Continue HCTZ 12.5 mg daily     GERD  Pt takes omeprazole 40mg daily and pepcid for breakthrough symptoms at home.   -Continue protonix 40mg daily     Morbid obesity- BMI 46  - nutritional counselling     Diet Diabetic   DVT Prophylaxis SQH   GI Prophylaxis Protonix   Code status Full   Disposition Stepdown>2MN      Point of Contact Billy Joy  Relationship: Daughter  [phone]         Signed By: Mylene Finnegan MD     May 24, 2020

## 2020-05-24 NOTE — PROGRESS NOTES
[facility]. Pulmonary, Critical Care, and Sleep Medicine    Follow up Pulmonary progress note    Name: Yarely Irizarry MRN: [id number]   : 1960 Hospital: [facility]   Date: 2020        This patient has been seen and evaluated at the request of Dr. Nancy Rich for COPD/Asthma exacerbation. IMPRESSION:   · Acute exacerbation of asthma/COPD: Patient has had multiple exacerbations in the last few months requiring corticosteroids  · Underlying poorly controlled severe persistent asthma with steroid dependence:  Pt has strong asthma component given significant bronchospasm to a wide variety of classic environmental triggers (I.e. fumes, fragrances, chemicals, smoke, mold, etc). Pt was on SCIT therapy many decades ago. · Chronic versus acute on chronic hypoxic and hypercapnic respiratory failure: pt on 2 L by nasal cannula chronically and on trilogy nightly  · Lactic acidosis: Patient had elevated lactic acid of 2.9 during her ER visit on 2020. Lactic acid increased to 5.6 this morning. Unclear source of tissue hypoperfusion versus lab aberrancy given that patient is normotensive without any vasopressor use. Patient has normal serum bicarb on metabolic panel as well as ABG. · Morbid obesity: Body mass index is 45.53 kg/m².    · SHERRIE/OHV: On trilogy (AVAPS-AE) as noted above  · Diabetes  · Hypertension  · Chronic steroid use: Patient on 10 mg every other day chronic prednisone     Patient Active Problem List   Diagnosis Code    Essential hypertension, benign I10    Diabetes mellitus, type 2 ([facility] E11.9    Esophageal reflux K21.9    SHERRIE on CPAP G47.33, Z99.89    COPD (chronic obstructive pulmonary disease) ([facility]) J44.9    Allergic rhinitis J30.9    COPD with acute exacerbation ([facility]) J44.1    Obesity, Class III, BMI 40-49.9 (morbid obesity) ([facility]) E66.01    Chest pain R07.9    Dyspnea R06.00    COPD exacerbation ([facility]) J44.1    Acute exacerbation of COPD with asthma ([facility] J44.1, T39.076    Diastolic CHF, chronic ([facility]) I50.32    Diverticulosis K57.90    COPD with acute bronchitis ([facility]) J44.0, J20.9    Hyperlipidemia E78.5    Type 2 diabetes with nephropathy ([facility]) E11.21    Bilateral carotid bruits R09.89    Palpitations R00.2    Acute exacerbation of chronic obstructive pulmonary disease (COPD) ([facility]) J44.1    Atelectasis of right lung J98.11      RECOMMENDATIONS:   IV steroids -40 mg every 8 hours. Wean as tolerated. Given severe asthma component, with multiple exacerbations recently, would advise an extended taper over 6 to 8 weeks. Given underlying diabetes, patient will need intensive management of blood sugars to avoid hyperglycemia complications  Schedule bronchodilators: duonebs q4h, pulmicort nebs 1mg BID, and albuterol PRN  Please hold home bronchodilators (Advair HFA and Spiriva). Please resume on discharge -- in addition, please add dual ICS therapy with Flovent HFA 200mcg 2 puffs BID (or ICS formulary alternative)  Agree with ABx - Z-milka x 5 days, no PNA on CXR  Trend lactate, advise blood cx, UA to complete sepsis workup  Supplemental oxygen to maintain SpO2 >88%, wean as tolerated  Please assess for home oxygen need prior to discharge  Bipap-ST QHS and PRN. Weaned to Nasal canula-tolerating 2LNC well. Aggressive pulmonary toileting/bronchial hygiene  Frequent incentive spirometry  Aspiration precautions including elevating HOB >30deg  PT/OT, OOB, ambulate with assistance as tolerated  DVT ppx per primary service  As an outpatient, pt will likely need anti IL-5R or anti IL-4/13 therapy given hx of multiple exacerbations AND oral steroid dependence -- discussed with pt at length, including risks and benefits, pt agreeable. As an outpatient, patient may benefit from allergy referral for SCIT therapy  Will follow     Subjective:     05/24/20   Patient feels significantly improved.   Started feeling well yesterday and today feels significantly better. Getting out of bed, walking to the bathroom with some shortness of breath  Denies any chest tightness or wheezing  Not much cough  Denies any new symptoms-encouraged with improvement    HPI per Dr. Buitrago Ear  Patient is a 60 y.o. female with a past medical history of COPD/asthma overlap, SHERRIE/OHV on AVAPS via trilogy, morbid obesity, diabetes, hypertension, presented to [facility] with acute onset of shortness of breath. Patient reports that symptoms started 2 days prior. Patient reports dyspnea was associated with minimal exertion and at rest, no alleviating factors including PRN albuterol via nebulizer or HFA. Patient reports that she was having frequent nocturnal awakenings despite using her trilogy nightly. Patient reports she normally sleeps well with trilogy machine. Patient reports that symptoms were present a week prior, patient was seen in the ER, and discharged on prednisone 50 mg x 3 days, however patient worsened after steroids were discontinued. Patient reports she is had multiple exacerbations over the last 2 months, symptoms worsened at the end of February, patient has been intermittently on burst of steroids at least 4 times in the last 2 months. Patient reports she is compliant with her Advair HFA 2 puffs twice daily and Spiriva once daily. Patient reports she quit smoking in 2007. Patient reports that she was a prior 1 pack/day smoker. 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 Patient reports that she has had some dyspnea relief after being given bronchodilators, patient was placed on continuous BiPAP which has worked well. Per nursing, patient was having pressure erythema over her face with continuous BiPAP. Patient reports having significant dyspnea with exposure to multiple triggers including fragrances, smells, pollen, dust, mold.   Patient reported that she had to stay with her daughter and had to sit outside while Lysol  was used, due to excessive dyspnea. Patient reports she has been on chronic prednisone 10 mg every other day for over a year now. Patient reports a remote history of asthma and allergies, used to see Dr. Mary Robertson, but reports she has not been seen for a few decades now, was on SCIT therapy at that time. Patient denies fevers, chills, night sweats, nausea, vomiting, diarrhea, hemoptysis, voice hoarseness. Past Medical History:   Diagnosis Date    Asthma     Chronic lung disease     COPD     Cystocele, midline     Diabetes mellitus ([address]     GERD (gastroesophageal reflux disease)     Hidradenitis suppurativa     Hyperlipidemia     Hypertension     SHERRIE on CPAP     CPAP    Stress incontinence       Past Surgical History:   Procedure Laterality Date    BREAST SURGERY PROCEDURE UNLISTED      Right breast benign tumor removal    HX APPENDECTOMY      HX CHOLECYSTECTOMY      HX DILATION AND CURETTAGE      Dysfunctional uterine bleeding, thought 2/2 fibroids    HX TUBAL LIGATION        Allergies   Allergen Reactions    Ancef [Cefazolin] Hives    Contrast Agent [Iodine] Anaphylaxis, Shortness of Breath and Swelling     Needs pre-medication for IV contrast with Benadryl, Solu-Medrol    Fish Containing Products Anaphylaxis     Pt states she had a severe allergic reaction at 9 y/o.  Statins-Hmg-Coa Reductase Inhibitors Myalgia    Metformin Other (comments)     Abdominal pain, diarrhea.     Codeine Other (comments)     Altered mental status      Current Facility-Administered Medications   Medication Dose Route Frequency    aspirin delayed-release tablet 81 mg  81 mg Oral DAILY    budesonide (PULMICORT) 1,000 mcg/2 mL nebulizer susp  1,000 mcg Nebulization BID RT    fluticasone propionate (FLONASE) 50 mcg/actuation nasal spray 2 Spray  2 Spray Both Nostrils DAILY    lisinopriL (PRINIVIL, ZESTRIL) tablet 20 mg  20 mg Oral BID    montelukast (SINGULAIR) tablet 10 mg  10 mg Oral QHS    heparin (porcine) injection 5,000 Units  5,000 Units SubCUTAneous Q8H    insulin lispro (HUMALOG) injection   SubCUTAneous AC&HS    insulin glargine (LANTUS) injection 35 Units  35 Units SubCUTAneous QPM    hydroCHLOROthiazide (MICROZIDE) capsule 12.5 mg  12.5 mg Oral DAILY    pantoprazole (PROTONIX) tablet 40 mg  40 mg Oral DAILY    furosemide (LASIX) tablet 20 mg  20 mg Oral DAILY    albuterol-ipratropium (DUO-NEB) 2.5 MG-0.5 MG/3 ML  3 mL Nebulization Q4H RT    methylPREDNISolone (PF) (SOLU-MEDROL) injection 40 mg  40 mg IntraVENous Q8H    0.9% sodium chloride infusion  150 mL/hr IntraVENous CONTINUOUS    azithromycin (ZITHROMAX) tablet 250 mg  250 mg Oral Once per day on        Review of Systems:  A comprehensive ROS was obtained as stated in HPI, all others are negative      Objective:   Vital Signs:    Visit Vitals  /74   Pulse 75   Temp 97.7 °F (36.5 °C)   Resp 26   Ht 5' 3\" (1.6 m)   Wt 116.6 kg (257 lb)   SpO2 100%   Breastfeeding No   BMI 45.53 kg/m²       O2 Device: Room air   O2 Flow Rate (L/min): 2 l/min   Temp (24hrs), Av.6 °F (36.4 °C), Min:96.5 °F (35.8 °C), Max:98.3 °F (36.8 °C)       Intake/Output:   Last shift:       07 -  190  In: 360 [P.O.:360]  Out: 500 [Urine:500]  Last 3 shifts: 1901 -  0700  In: 4092.5 [P.O.:720; I.V.:3372.5]  Out: 2650 [Urine:2650]    Intake/Output Summary (Last 24 hours) at 2020 1534  Last data filed at 2020 1334  Gross per 24 hour   Intake 2070 ml   Output 1800 ml   Net 270 ml      Physical Exam:   General:  Alert, cooperative, no distress, appears stated age, obese, comfortable, taken off of BiPAP and placed on nasal cannula, comfortable, able to talk in full sentences. Head:  Normocephalic, without obvious abnormality, atraumatic. Eyes:  Conjunctivae/corneas clear. ANicteric, PERRLA, EOMI   Nose: Nares normal. Mucosa normal. No drainage or sinus tenderness. Throat: Lips, mucosa dry.  NO thrush;, poor dentition, no oral lesions, Mallampati IV   Neck: Supple, symmetrical, trachea midline, no adenopathy, thyroid: no enlargment/tenderness/nodules, no carotid bruit and no JVD. No crepitus   Back:   Symmetric, no curvature, no spine tenderness or flank pain   Lungs:    Poor air entry in bilateral bases, CTA BL, no wheezes/rales/rhonchi throughout all lung fields   Chest wall:  No tenderness or deformity. NO CREPITUS   Heart:  Regular rate and rhythm, S1, S2 normal, no murmur, click, rub or gallop. Abdomen:   Soft, obese, non-tender. Bowel sounds normal. No masses,  No organomegaly. No paradoxical motion   Extremities: normal, atraumatic, no cyanosis or edema. No clubbing   Pulses: 1-2+ and symmetric all extremities.    Skin: Skin color, texture, turgor normal. No rashes or lesions   Lymph nodes: Cervical, supraclavicular, and axillary nodes normal.   Neurologic: Grossly nonfocal, strength and coordination and sensation grossly intact throughout all extremities, AO x3          Data review:   Labs:  Recent Results (from the past 24 hour(s))   LACTIC ACID    Collection Time: 05/23/20  3:41 PM   Result Value Ref Range    Lactic acid 3.1 (HH) 0.4 - 2.0 MMOL/L   GLUCOSE, POC    Collection Time: 05/23/20  4:35 PM   Result Value Ref Range    Glucose (POC) 161 (H) 70 - 110 mg/dL   LACTIC ACID    Collection Time: 05/23/20  7:40 PM   Result Value Ref Range    Lactic acid 4.8 (HH) 0.4 - 2.0 MMOL/L   GLUCOSE, POC    Collection Time: 05/23/20  9:54 PM   Result Value Ref Range    Glucose (POC) 321 (H) 70 - 110 mg/dL   CBC WITH AUTOMATED DIFF    Collection Time: 05/24/20 12:40 AM   Result Value Ref Range    WBC 10.7 4.6 - 13.2 K/uL    RBC 3.73 (L) 4.20 - 5.30 M/uL    HGB 11.5 (L) 12.0 - 16.0 g/dL    HCT 34.1 (L) 35.0 - 45.0 %    MCV 91.4 74.0 - 97.0 FL    MCH 30.8 24.0 - 34.0 PG    MCHC 33.7 31.0 - 37.0 g/dL    RDW 14.4 11.6 - 14.5 %    PLATELET 780 157 - 106 K/uL    MPV 9.2 9.2 - 11.8 FL    NEUTROPHILS 83 (H) 42 - 75 %    BAND NEUTROPHILS 3 0 - 5 %    LYMPHOCYTES 11 (L) 20 - 51 %    MONOCYTES 3 2 - 9 %    EOSINOPHILS 0 0 - 5 %    BASOPHILS 0 0 - 3 %    ABS. NEUTROPHILS 9.2 (H) 1.8 - 8.0 K/UL    ABS. LYMPHOCYTES 1.2 0.8 - 3.5 K/UL    ABS. MONOCYTES 0.3 0 - 1.0 K/UL    ABS. EOSINOPHILS 0.0 0.0 - 0.4 K/UL    ABS.  BASOPHILS 0.0 0.0 - 0.06 K/UL    DF AUTOMATED      PLATELET COMMENTS ADEQUATE PLATELETS      RBC COMMENTS NORMOCYTIC, NORMOCHROMIC     LACTIC ACID    Collection Time: 05/24/20 12:40 AM   Result Value Ref Range    Lactic acid 4.3 (HH) 0.4 - 2.0 MMOL/L   METABOLIC PANEL, BASIC    Collection Time: 05/24/20 12:40 AM   Result Value Ref Range    Sodium 139 136 - 145 mmol/L    Potassium 4.4 3.5 - 5.5 mmol/L    Chloride 105 100 - 111 mmol/L    CO2 27 21 - 32 mmol/L    Anion gap 7 3.0 - 18 mmol/L    Glucose 254 (H) 74 - 99 mg/dL    BUN 19 (H) 7.0 - 18 MG/DL    Creatinine 1.01 0.6 - 1.3 MG/DL    BUN/Creatinine ratio 19 12 - 20      GFR est AA >60 >60 ml/min/1.73m2    GFR est non-AA 56 (L) >60 ml/min/1.73m2    Calcium 8.9 8.5 - 10.1 MG/DL   LACTIC ACID    Collection Time: 05/24/20  5:29 AM   Result Value Ref Range    Lactic acid 3.8 (HH) 0.4 - 2.0 MMOL/L   GLUCOSE, POC    Collection Time: 05/24/20  7:30 AM   Result Value Ref Range    Glucose (POC) 255 (H) 70 - 110 mg/dL   LACTIC ACID    Collection Time: 05/24/20 10:28 AM   Result Value Ref Range    Lactic acid 6.6 (HH) 0.4 - 2.0 MMOL/L   URINALYSIS W/MICROSCOPIC    Collection Time: 05/24/20 11:00 AM   Result Value Ref Range    Color YELLOW      Appearance CLEAR      Specific gravity 1.012 1.005 - 1.030      pH (UA) 5.0 5.0 - 8.0      Protein Negative NEG mg/dL    Glucose >1,000 (A) NEG mg/dL    Ketone Negative NEG mg/dL    Bilirubin Negative NEG      Blood Negative NEG      Urobilinogen 0.2 0.2 - 1.0 EU/dL    Nitrites Negative NEG      Leukocyte Esterase Negative NEG      WBC 0 to 2 0 - 4 /hpf    RBC Negative 0 - 5 /hpf    Epithelial cells 1+ 0 - 5 /lpf    Bacteria 1+ (A) NEG /hpf   GLUCOSE, POC    Collection Time: 05/24/20 11:27 AM   Result Value Ref Range    Glucose (POC) 318 (H) 70 - 110 mg/dL   ECHO ADULT COMPLETE    Collection Time: 05/24/20  1:14 PM   Result Value Ref Range    LV E' Lateral Velocity 11.71 cm/s    LV E' Septal Velocity 9.30 cm/s    Tapse 2.39 (A) 1.5 - 2.0 cm    LVIDd 4.06 3.9 - 5.3 cm    LVPWd 1.52 (A) 0.6 - 0.9 cm    LVIDs 2.19 cm    IVSd 1.52 (A) 0.6 - 0.9 cm    MV A Moe 80.67 cm/s    MV E Moe 149.53 cm/s    MV E/A 1.90     Inferior Vena Cava Diameter Sniffing 2.42 cm    LV Mass .3 (A) 67 - 162 g    LV Mass AL Index 106.4 (A) 43 - 95 g/m2    E/E' lateral 12.80     E/E' septal 16.08     E/E' ratio (averaged) 14.42     Mitral Valve E Wave Deceleration Time 240.1 ms    Triscuspid Valve Regurgitation Peak Gradient 45.6 mmHg    TR Max Velocity 337.54 cm/s    Left Ventricular Fractional Shortening by 2D 88.81176762 %    Mitral Valve Deceleration [place] [id number]     Left Ventricular End Diastolic Volume by Teichholz Method 9.77627279253 mL    Left Ventricular End Systolic Volume by Teichholz Method 3.77540170364 mL    Left Ventricular Stroke Volume by Teichholz Method 88.316503226 mL     ABG:    No results found for: PH, PHI, PCO2, PCO2I, PO2, PO2I, HCO3, HCO3I, FIO2, FIO2I    PFT Results  (Last 48 hours)    None        Echo Results  (Last 48 hours)    None        Imaging:  I have personally reviewed the patients radiographs and have reviewed the reports:  Chest x-ray from 5/21/2020 shows clear lung fields bilaterally, with right middle lobe atelectasis linear, no masses, consolidations, effusions seen.   CT scan from 2/21/2018 reviewed personally shows scattered mild centrilobular emphysema mainly in upper lung fields, mild, along with right middle lobe and lower lobe linear atelectasis, mild, no masses, effusions, infiltrates seen  CXR Results  (Last 48 hours)    None        CT Results  (Last 48 hours)    None            High complexity decision making was performed during the evaluation of this patient at high risk for decompensation with multiple organ involvement     Above mentioned total time spent on reviewing the case/medical record/data/notes/EMR/patient examination/documentation/coordinating care with nurse/consultants, exclusive of procedures with complex decision making performed and > 50% time spent in face to face evaluation.            Staci Oliver MD     Pulmonary, Critical Care Medicine  [facility]

## 2020-05-24 NOTE — PROGRESS NOTES
0720: [facility] [address]: ECHO performed bedside. 1235: Notified Dr. My Ludwig from [facility] of lactic acid value of 6.6. Will continue to monitor. 1600: Pt on recliner reading. 1935: Bedside shift change report given to Sofi Conley RN (oncoming nurse) by Darline Esparza RN (offgoing nurse). Report included the following information SBAR, Kardex, Intake/Output, MAR, Recent Results and Med Rec Status.

## 2020-05-24 NOTE — ROUTINE PROCESS
Bedside shift change report given to Polly Murray RN (oncoming nurse) by Carlos Aceves RN (offgoing nurse). Report included the following information SBAR, Procedure Summary, Intake/Output, MAR and Recent Results.

## 2020-05-24 NOTE — PROGRESS NOTES
Bedside report from [facility]. VSS, pt in NAD. Bed in lowest position. Call light within reach. Instructed to call for assistance.

## 2020-05-25 LAB
ANION GAP SERPL CALC-SCNC: 6 MMOL/L (ref 3–18)
BASOPHILS # BLD: 0 K/UL (ref 0–0.1)
BASOPHILS NFR BLD: 0 % (ref 0–2)
BUN SERPL-MCNC: 15 MG/DL (ref 7–18)
BUN/CREAT SERPL: 21 (ref 12–20)
CALCIUM SERPL-MCNC: 6.7 MG/DL (ref 8.5–10.1)
CHLORIDE SERPL-SCNC: 112 MMOL/L (ref 100–111)
CO2 SERPL-SCNC: 23 MMOL/L (ref 21–32)
CREAT SERPL-MCNC: 0.72 MG/DL (ref 0.6–1.3)
DIFFERENTIAL METHOD BLD: ABNORMAL
ECHO IVC SNIFF: 2.42 CM
ECHO LV E' LATERAL VELOCITY: 11.71 CM/S
ECHO LV E' SEPTAL VELOCITY: 9.3 CM/S
ECHO LV EDV TEICHHOLZ: 0.45 ML
ECHO LV ESV TEICHHOLZ: 0.1 ML
ECHO LV INTERNAL DIMENSION DIASTOLIC: 4.06 CM (ref 3.9–5.3)
ECHO LV INTERNAL DIMENSION SYSTOLIC: 2.19 CM
ECHO LV IVSD: 1.52 CM (ref 0.6–0.9)
ECHO LV MASS 2D: 288.3 G (ref 67–162)
ECHO LV MASS INDEX 2D: 134 G/M2 (ref 43–95)
ECHO LV POSTERIOR WALL DIASTOLIC: 1.52 CM (ref 0.6–0.9)
ECHO MV A VELOCITY: 80.67 CM/S
ECHO MV E DECELERATION TIME (DT): 240.1 MS
ECHO MV E VELOCITY: 149.53 CM/S
ECHO MV E/A RATIO: 1.85
ECHO MV E/E' LATERAL: 12.77
ECHO MV E/E' RATIO (AVERAGED): 14.42
ECHO MV E/E' SEPTAL: 16.08
ECHO RV TAPSE: 2.39 CM (ref 1.5–2)
ECHO TV REGURGITANT MAX VELOCITY: 337.54 CM/S
ECHO TV REGURGITANT PEAK GRADIENT: 45.6 MMHG
EOSINOPHIL # BLD: 0 K/UL (ref 0–0.4)
EOSINOPHIL NFR BLD: 0 % (ref 0–5)
ERYTHROCYTE [DISTWIDTH] IN BLOOD BY AUTOMATED COUNT: 14.3 % (ref 11.6–14.5)
GLUCOSE BLD STRIP.AUTO-MCNC: 268 MG/DL (ref 70–110)
GLUCOSE BLD STRIP.AUTO-MCNC: 282 MG/DL (ref 70–110)
GLUCOSE BLD STRIP.AUTO-MCNC: 296 MG/DL (ref 70–110)
GLUCOSE BLD STRIP.AUTO-MCNC: 340 MG/DL (ref 70–110)
GLUCOSE SERPL-MCNC: 219 MG/DL (ref 74–99)
HCT VFR BLD AUTO: 31.6 % (ref 35–45)
HGB BLD-MCNC: 10.6 G/DL (ref 12–16)
LACTATE SERPL-SCNC: 3 MMOL/L (ref 0.4–2)
LACTATE SERPL-SCNC: 5 MMOL/L (ref 0.4–2)
LACTATE SERPL-SCNC: 5.1 MMOL/L (ref 0.4–2)
LACTATE SERPL-SCNC: 5.6 MMOL/L (ref 0.4–2)
LVFS 2D: 46.05 %
LVSV (TEICH): 24.81 ML
LYMPHOCYTES # BLD: 0.7 K/UL (ref 0.9–3.6)
LYMPHOCYTES NFR BLD: 8 % (ref 21–52)
MCH RBC QN AUTO: 30.6 PG (ref 24–34)
MCHC RBC AUTO-ENTMCNC: 33.5 G/DL (ref 31–37)
MCV RBC AUTO: 91.3 FL (ref 74–97)
MONOCYTES # BLD: 0.4 K/UL (ref 0.05–1.2)
MONOCYTES NFR BLD: 4 % (ref 3–10)
MV DEC SLOPE: 6.23
NEUTS SEG # BLD: 7.8 K/UL (ref 1.8–8)
NEUTS SEG NFR BLD: 88 % (ref 40–73)
PLATELET # BLD AUTO: 271 K/UL (ref 135–420)
PMV BLD AUTO: 9.1 FL (ref 9.2–11.8)
POTASSIUM SERPL-SCNC: 3.4 MMOL/L (ref 3.5–5.5)
POTASSIUM SERPL-SCNC: 4.3 MMOL/L (ref 3.5–5.5)
RBC # BLD AUTO: 3.46 M/UL (ref 4.2–5.3)
SODIUM SERPL-SCNC: 141 MMOL/L (ref 136–145)
WBC # BLD AUTO: 8.8 K/UL (ref 4.6–13.2)

## 2020-05-25 PROCEDURE — 74011000250 HC RX REV CODE- 250: Performed by: STUDENT IN AN ORGANIZED HEALTH CARE EDUCATION/TRAINING PROGRAM

## 2020-05-25 PROCEDURE — 94660 CPAP INITIATION&MGMT: CPT

## 2020-05-25 PROCEDURE — 74011250636 HC RX REV CODE- 250/636: Performed by: STUDENT IN AN ORGANIZED HEALTH CARE EDUCATION/TRAINING PROGRAM

## 2020-05-25 PROCEDURE — 82962 GLUCOSE BLOOD TEST: CPT

## 2020-05-25 PROCEDURE — 74011636637 HC RX REV CODE- 636/637: Performed by: FAMILY MEDICINE

## 2020-05-25 PROCEDURE — 36415 COLL VENOUS BLD VENIPUNCTURE: CPT

## 2020-05-25 PROCEDURE — 94640 AIRWAY INHALATION TREATMENT: CPT

## 2020-05-25 PROCEDURE — 83605 ASSAY OF LACTIC ACID: CPT

## 2020-05-25 PROCEDURE — 74011250637 HC RX REV CODE- 250/637: Performed by: STUDENT IN AN ORGANIZED HEALTH CARE EDUCATION/TRAINING PROGRAM

## 2020-05-25 PROCEDURE — 65660000004 HC RM CVT STEPDOWN

## 2020-05-25 PROCEDURE — 84132 ASSAY OF SERUM POTASSIUM: CPT

## 2020-05-25 PROCEDURE — 85025 COMPLETE CBC W/AUTO DIFF WBC: CPT

## 2020-05-25 PROCEDURE — 94762 N-INVAS EAR/PLS OXIMTRY CONT: CPT

## 2020-05-25 PROCEDURE — 74011636637 HC RX REV CODE- 636/637: Performed by: STUDENT IN AN ORGANIZED HEALTH CARE EDUCATION/TRAINING PROGRAM

## 2020-05-25 RX ORDER — POTASSIUM CHLORIDE 20 MEQ/1
20 TABLET, EXTENDED RELEASE ORAL
Status: COMPLETED | OUTPATIENT
Start: 2020-05-25 | End: 2020-05-25

## 2020-05-25 RX ORDER — PREDNISONE 20 MG/1
60 TABLET ORAL
Status: DISCONTINUED | OUTPATIENT
Start: 2020-05-26 | End: 2020-05-26 | Stop reason: HOSPADM

## 2020-05-25 RX ORDER — PREDNISONE 20 MG/1
60 TABLET ORAL 2 TIMES DAILY WITH MEALS
Status: DISCONTINUED | OUTPATIENT
Start: 2020-05-25 | End: 2020-05-25

## 2020-05-25 RX ORDER — INSULIN GLARGINE 100 [IU]/ML
40 INJECTION, SOLUTION SUBCUTANEOUS EVERY EVENING
Status: DISCONTINUED | OUTPATIENT
Start: 2020-05-25 | End: 2020-05-26 | Stop reason: HOSPADM

## 2020-05-25 RX ADMIN — INSULIN LISPRO 9 UNITS: 100 INJECTION, SOLUTION INTRAVENOUS; SUBCUTANEOUS at 16:30

## 2020-05-25 RX ADMIN — LISINOPRIL 20 MG: 20 TABLET ORAL at 08:18

## 2020-05-25 RX ADMIN — ACETAMINOPHEN 325 MG: 325 TABLET ORAL at 14:44

## 2020-05-25 RX ADMIN — HEPARIN SODIUM 5000 UNITS: 5000 INJECTION INTRAVENOUS; SUBCUTANEOUS at 21:07

## 2020-05-25 RX ADMIN — POTASSIUM CHLORIDE 20 MEQ: 1500 TABLET, EXTENDED RELEASE ORAL at 09:50

## 2020-05-25 RX ADMIN — MONTELUKAST 10 MG: 10 TABLET, FILM COATED ORAL at 21:08

## 2020-05-25 RX ADMIN — METHYLPREDNISOLONE SODIUM SUCCINATE 40 MG: 40 INJECTION, POWDER, FOR SOLUTION INTRAMUSCULAR; INTRAVENOUS at 05:14

## 2020-05-25 RX ADMIN — HYDROCHLOROTHIAZIDE 12.5 MG: 12.5 CAPSULE ORAL at 08:19

## 2020-05-25 RX ADMIN — INSULIN LISPRO 9 UNITS: 100 INJECTION, SOLUTION INTRAVENOUS; SUBCUTANEOUS at 07:30

## 2020-05-25 RX ADMIN — ASPIRIN 81 MG: 81 TABLET, COATED ORAL at 08:18

## 2020-05-25 RX ADMIN — AZITHROMYCIN MONOHYDRATE 250 MG: 250 TABLET ORAL at 08:25

## 2020-05-25 RX ADMIN — SODIUM CHLORIDE 150 ML/HR: 900 INJECTION, SOLUTION INTRAVENOUS at 09:55

## 2020-05-25 RX ADMIN — HEPARIN SODIUM 5000 UNITS: 5000 INJECTION INTRAVENOUS; SUBCUTANEOUS at 14:41

## 2020-05-25 RX ADMIN — IPRATROPIUM BROMIDE AND ALBUTEROL SULFATE 3 ML: .5; 3 SOLUTION RESPIRATORY (INHALATION) at 20:08

## 2020-05-25 RX ADMIN — PANTOPRAZOLE SODIUM 40 MG: 40 TABLET, DELAYED RELEASE ORAL at 08:19

## 2020-05-25 RX ADMIN — INSULIN LISPRO 9 UNITS: 100 INJECTION, SOLUTION INTRAVENOUS; SUBCUTANEOUS at 21:07

## 2020-05-25 RX ADMIN — BUDESONIDE 1000 MCG: 1 SUSPENSION RESPIRATORY (INHALATION) at 07:11

## 2020-05-25 RX ADMIN — SODIUM CHLORIDE 150 ML/HR: 900 INJECTION, SOLUTION INTRAVENOUS at 02:10

## 2020-05-25 RX ADMIN — POTASSIUM CHLORIDE 20 MEQ: 1500 TABLET, EXTENDED RELEASE ORAL at 12:08

## 2020-05-25 RX ADMIN — LISINOPRIL 20 MG: 20 TABLET ORAL at 21:08

## 2020-05-25 RX ADMIN — INSULIN GLARGINE 40 UNITS: 100 INJECTION, SOLUTION SUBCUTANEOUS at 21:07

## 2020-05-25 RX ADMIN — FUROSEMIDE 20 MG: 20 TABLET ORAL at 08:19

## 2020-05-25 RX ADMIN — POTASSIUM CHLORIDE 20 MEQ: 1500 TABLET, EXTENDED RELEASE ORAL at 08:19

## 2020-05-25 RX ADMIN — IPRATROPIUM BROMIDE AND ALBUTEROL SULFATE 3 ML: .5; 3 SOLUTION RESPIRATORY (INHALATION) at 01:36

## 2020-05-25 RX ADMIN — FLUTICASONE PROPIONATE 2 SPRAY: 50 SPRAY, METERED NASAL at 08:23

## 2020-05-25 RX ADMIN — PREDNISONE 60 MG: 20 TABLET ORAL at 09:50

## 2020-05-25 RX ADMIN — HEPARIN SODIUM 5000 UNITS: 5000 INJECTION INTRAVENOUS; SUBCUTANEOUS at 05:14

## 2020-05-25 RX ADMIN — INSULIN LISPRO 12 UNITS: 100 INJECTION, SOLUTION INTRAVENOUS; SUBCUTANEOUS at 11:30

## 2020-05-25 RX ADMIN — BUDESONIDE 1000 MCG: 1 SUSPENSION RESPIRATORY (INHALATION) at 20:08

## 2020-05-25 RX ADMIN — IPRATROPIUM BROMIDE AND ALBUTEROL SULFATE 3 ML: .5; 3 SOLUTION RESPIRATORY (INHALATION) at 07:11

## 2020-05-25 RX ADMIN — ACETAMINOPHEN 325 MG: 325 TABLET ORAL at 21:08

## 2020-05-25 RX ADMIN — IPRATROPIUM BROMIDE AND ALBUTEROL SULFATE 3 ML: .5; 3 SOLUTION RESPIRATORY (INHALATION) at 11:16

## 2020-05-25 RX ADMIN — IPRATROPIUM BROMIDE AND ALBUTEROL SULFATE 3 ML: .5; 3 SOLUTION RESPIRATORY (INHALATION) at 15:34

## 2020-05-25 RX ADMIN — IPRATROPIUM BROMIDE AND ALBUTEROL SULFATE 3 ML: .5; 3 SOLUTION RESPIRATORY (INHALATION) at 04:00

## 2020-05-25 NOTE — PROGRESS NOTES
Problem: Falls - Risk of  Goal: *Absence of Falls  Description: Document Kd Ramirez Fall Risk and appropriate interventions in the flowsheet. Outcome: Progressing Towards Goal  Note: Fall Risk Interventions:            Medication Interventions: Bed/chair exit alarm, Patient to call before getting OOB, Teach patient to arise slowly                   Problem: Patient Education: Go to Patient Education Activity  Goal: Patient/Family Education  Outcome: Progressing Towards Goal     Problem: Diabetes Self-Management  Goal: *Disease process and treatment process  Description: Define diabetes and identify own type of diabetes; list 3 options for treating diabetes. Outcome: Progressing Towards Goal  Goal: *Incorporating nutritional management into lifestyle  Description: Describe effect of type, amount and timing of food on blood glucose; list 3 methods for planning meals. Outcome: Progressing Towards Goal  Goal: *Incorporating physical activity into lifestyle  Description: State effect of exercise on blood glucose levels. Outcome: Progressing Towards Goal  Goal: *Developing strategies to promote health/change behavior  Description: Define the ABC's of diabetes; identify appropriate screenings, schedule and personal plan for screenings. Outcome: Progressing Towards Goal  Goal: *Using medications safely  Description: State effect of diabetes medications on diabetes; name diabetes medication taking, action and side effects. Outcome: Progressing Towards Goal  Goal: *Monitoring blood glucose, interpreting and using results  Description: Identify recommended blood glucose targets  and personal targets. Outcome: Progressing Towards Goal  Goal: *Prevention, detection, treatment of acute complications  Description: List symptoms of hyper- and hypoglycemia; describe how to treat low blood sugar and actions for lowering  high blood glucose level.   Outcome: Progressing Towards Goal  Goal: *Prevention, detection and treatment of chronic complications  Description: Define the natural course of diabetes and describe the relationship of blood glucose levels to long term complications of diabetes.   Outcome: Progressing Towards Goal  Goal: *Developing strategies to address psychosocial issues  Description: Describe feelings about living with diabetes; identify support needed and support network  Outcome: Progressing Towards Goal  Goal: *Insulin pump training  Outcome: Progressing Towards Goal  Goal: *Sick day guidelines  Outcome: Progressing Towards Goal  Goal: *Patient Specific Goal (EDIT GOAL, INSERT TEXT)  Outcome: Progressing Towards Goal     Problem: Patient Education: Go to Patient Education Activity  Goal: Patient/Family Education  Outcome: Progressing Towards Goal     Problem: Patient Education: Go to Patient Education Activity  Goal: Patient/Family Education  Outcome: Progressing Towards Goal     Problem: Patient Education: Go to Patient Education Activity  Goal: Patient/Family Education  Outcome: Progressing Towards Goal     Problem: Breathing Pattern - Ineffective  Goal: *Absence of hypoxia  Outcome: Progressing Towards Goal  Goal: *Use of effective breathing techniques  Outcome: Progressing Towards Goal     Problem: Patient Education: Go to Patient Education Activity  Goal: Patient/Family Education  Outcome: Progressing Towards Goal

## 2020-05-25 NOTE — PROGRESS NOTES
Progress Note    Patient: Kerrie Pichardo MRN: [id number]  SSN: [id number]    YOB: 1960  Age: 60 y.o. Sex: female      Admit Date: 5/21/2020    LOS: 3 days     Subjective:     Pt eating breakfast this AM and able to speak in full sentences without becoming dyspneic. She still had lower abdominal and periumbilical pain in relation to her umbilical hernia. Review of Systems   Constitutional: Negative for chills and fever. HENT: Negative for congestion and sore throat. Eyes: Negative for blurred vision. Respiratory: Positive for cough and shortness of breath. Cardiovascular: Negative for chest pain and palpitations. Gastrointestinal: Positive for abdominal pain. Negative for constipation, diarrhea, nausea and vomiting. Musculoskeletal: Negative for back pain and joint pain. Neurological: Negative for dizziness. Objective:     Vitals:    05/25/20 0400 05/25/20 0509 05/25/20 0703 05/25/20 0717   BP: 143/69   149/65   Pulse: 64 (!) 53  73   Resp: 16 18  24   Temp: 97.8 °F (36.6 °C)   97.7 °F (36.5 °C)   SpO2: 99% 100% 98% 98%   Weight: 120 kg (264 lb 8.8 oz)      Height:            Intake and Output:  Current Shift: No intake/output data recorded. Last three shifts: 05/23 1901 - 05/25 0700  In: 5214.5 [P.O.:600; I.V.:4614.5]  Out: 2950 [Urine:2950]    Physical Exam:   General:  Alert and responsive, getting breathing treatment  CV:  RRR, no murmurs/rubs/gallops. Resp: Decrease air movent throughout bilateral posterior lung fields and diffuse mild wheezes. Abd:  Soft, obese, diffusly tender in lower abdomen. No rebound or guarding. Reducible umbilical hernia, BS+  Neuro: A+Ox3. Ext:  2+ radial and dp pulses bilaterally. Trace edema in Lower ext. Skin:  No rashes, lesions, or ulcers. Good turgor.     Lab/Data Review:  Recent Results (from the past 24 hour(s))   LACTIC ACID    Collection Time: 05/24/20 10:28 AM   Result Value Ref Range    Lactic acid 6.6 (HH) 0.4 - 2.0 MMOL/L   URINALYSIS W/MICROSCOPIC    Collection Time: 05/24/20 11:00 AM   Result Value Ref Range    Color YELLOW      Appearance CLEAR      Specific gravity 1.012 1.005 - 1.030      pH (UA) 5.0 5.0 - 8.0      Protein Negative NEG mg/dL    Glucose >1,000 (A) NEG mg/dL    Ketone Negative NEG mg/dL    Bilirubin Negative NEG      Blood Negative NEG      Urobilinogen 0.2 0.2 - 1.0 EU/dL    Nitrites Negative NEG      Leukocyte Esterase Negative NEG      WBC 0 to 2 0 - 4 /hpf    RBC Negative 0 - 5 /hpf    Epithelial cells 1+ 0 - 5 /lpf    Bacteria 1+ (A) NEG /hpf   GLUCOSE, POC    Collection Time: 05/24/20 11:27 AM   Result Value Ref Range    Glucose (POC) 318 (H) 70 - 110 mg/dL   ECHO ADULT COMPLETE    Collection Time: 05/24/20  1:14 PM   Result Value Ref Range    LV E' Lateral Velocity 11.71 cm/s    LV E' Septal Velocity 9.30 cm/s    Tapse 2.39 (A) 1.5 - 2.0 cm    LVIDd 4.06 3.9 - 5.3 cm    LVPWd 1.52 (A) 0.6 - 0.9 cm    LVIDs 2.19 cm    IVSd 1.52 (A) 0.6 - 0.9 cm    MV A Moe 80.67 cm/s    MV E Moe 149.53 cm/s    MV E/A 1.90     Inferior Vena Cava Diameter Sniffing 2.42 cm    LV Mass .3 (A) 67 - 162 g    LV Mass AL Index 106.4 (A) 43 - 95 g/m2    E/E' lateral 12.80     E/E' septal 16.08     E/E' ratio (averaged) 14.42     Mitral Valve E Wave Deceleration Time 240.1 ms    Triscuspid Valve Regurgitation Peak Gradient 45.6 mmHg    TR Max Velocity 337.54 cm/s    Left Ventricular Fractional Shortening by 2D 56.91198555 %    Mitral Valve Deceleration [place]one [id number]     Left Ventricular End Diastolic Volume by Teichholz Method 1.47229867604 mL    Left Ventricular End Systolic Volume by Teichholz Method 9.35587907184 mL    Left Ventricular Stroke Volume by Teichholz Method 21.482201775 mL   LACTIC ACID    Collection Time: 05/24/20  3:05 PM   Result Value Ref Range    Lactic acid 3.6 (HH) 0.4 - 2.0 MMOL/L   GLUCOSE, POC    Collection Time: 05/24/20  4:57 PM   Result Value Ref Range    Glucose (POC) 216 (H) 70 - 110 mg/dL   LACTIC ACID    Collection Time: 05/24/20  6:21 PM   Result Value Ref Range    Lactic acid 5.0 (HH) 0.4 - 2.0 MMOL/L   GLUCOSE, POC    Collection Time: 05/24/20  8:52 PM   Result Value Ref Range    Glucose (POC) 289 (H) 70 - 110 mg/dL   LACTIC ACID    Collection Time: 05/24/20 11:10 PM   Result Value Ref Range    Lactic acid 5.1 (HH) 0.4 - 2.0 MMOL/L   METABOLIC PANEL, BASIC    Collection Time: 05/25/20  4:19 AM   Result Value Ref Range    Sodium 141 136 - 145 mmol/L    Potassium 3.4 (L) 3.5 - 5.5 mmol/L    Chloride 112 (H) 100 - 111 mmol/L    CO2 23 21 - 32 mmol/L    Anion gap 6 3.0 - 18 mmol/L    Glucose 219 (H) 74 - 99 mg/dL    BUN 15 7.0 - 18 MG/DL    Creatinine 0.72 0.6 - 1.3 MG/DL    BUN/Creatinine ratio 21 (H) 12 - 20      GFR est AA >60 >60 ml/min/1.73m2    GFR est non-AA >60 >60 ml/min/1.73m2    Calcium 6.7 (L) 8.5 - 10.1 MG/DL   CBC WITH AUTOMATED DIFF    Collection Time: 05/25/20  4:19 AM   Result Value Ref Range    WBC 8.8 4.6 - 13.2 K/uL    RBC 3.46 (L) 4.20 - 5.30 M/uL    HGB 10.6 (L) 12.0 - 16.0 g/dL    HCT 31.6 (L) 35.0 - 45.0 %    MCV 91.3 74.0 - 97.0 FL    MCH 30.6 24.0 - 34.0 PG    MCHC 33.5 31.0 - 37.0 g/dL    RDW 14.3 11.6 - 14.5 %    PLATELET 224 209 - 309 K/uL    MPV 9.1 (L) 9.2 - 11.8 FL    NEUTROPHILS 88 (H) 40 - 73 %    LYMPHOCYTES 8 (L) 21 - 52 %    MONOCYTES 4 3 - 10 %    EOSINOPHILS 0 0 - 5 %    BASOPHILS 0 0 - 2 %    ABS. NEUTROPHILS 7.8 1.8 - 8.0 K/UL    ABS. LYMPHOCYTES 0.7 (L) 0.9 - 3.6 K/UL    ABS. MONOCYTES 0.4 0.05 - 1.2 K/UL    ABS. EOSINOPHILS 0.0 0.0 - 0.4 K/UL    ABS. BASOPHILS 0.0 0.0 - 0.1 K/UL    DF AUTOMATED     LACTIC ACID    Collection Time: 05/25/20  4:19 AM   Result Value Ref Range    Lactic acid 3.0 (HH) 0.4 - 2.0 MMOL/L   GLUCOSE, POC    Collection Time: 05/25/20  7:20 AM   Result Value Ref Range    Glucose (POC) 268 (H) 70 - 110 mg/dL     Xr Chest Port    Result Date: 5/22/2020  Impression:  1. No acute finding.  2. Stable bibasilar streaky opacities, probably scarring or atelectasis. 3. Hyperinflation/emphysema. Assessment/Plan:   Reese De Dios is a 60 y.o. female with PMH of COPD on home O2, IDDM2, HTN, HFpEF, SHERRIE on CPAP, GERD, allergic rhinitis, now admitted for COPD vs asthma exacerbation.      Acute on chronic hypoxic respiratory failure likely secondary to Asthma/COPD Exacerbation   Likely 2/2 to environmental trigger vs infection. Infection less likely since pt with no URI symptoms, no fever or leukoysis. Patient tachypnic in the ED, placed on bipap, received duonebx4, mag, IV solumedrol 125mg in the ED. ABG: pH 7.377, pCO2 45.5, pO2 259, HCO3 26.6 (on bipap). CXR: no acute findings, hyperinflation. Patient is on 2L O2 at home. Follows Dr. Jono Rojo in outpatient. This is her fifth admission in 2020 for COPD/Asthma exacerbation, most recent 4/8/20-4/11/20. Per Dr. Jerica Mendez, this admission, pt has strong asthma component given significant bronchospasm to a wide variety of classic environmental triggers (I.e. fumes, fragrances, chemicals, smoke, mold, etc).  Improving this morning. Speaking in full sentences on room air  - Pulmonology following, appreciate recs  - Continue Bipap at night and as needed   - oxygen as needed, goal 88-92%  - Duonebs q4hrs scheduled  - Pulmicort nebs 1mg BID  - Albuterol PRN    - Incentive spirometry  - Continue home loratadine, flonase and singular  - Steroid taper per pulm- transition from 40mg Solumedrol IV q8hrs to 60mg prednisone PO BID. Will have extended taper for 6-8 weeks on discharge  - Continue Azithromycin 250mg x 5 days  - Aspiration precautions  - PT/OT  - FU outpatient with pulmonology(Dr. Jerica Mendez) for allergy workup(Flovent HFA 200mcg 2 puffs BID (or [employer] formulary alternative) when pt discharged)     Lactic acidosis- 5.6 on admission. . No s/sx's of sepsis or infection, but will work up. Has had persistently elevated lactic acid in past admissions.  BCx: NG2D, UA WNL  - continue IVF NS@ 150ml/hr   - trend LA q4hrs       SHERRIE: Intolerant to Cpap. On trilogy machine nightly at home.   - BIPAP qhs in place home trilogy  - FU echo to evaluate for pHTN     Insulin dependent Type 2 Diabetes  Home lantus 35u pm and novolog SSI. BS have been in the 200's- 300's. On high dose steroids, which will also contribute to her hyperglycemia  -Increase Lantus 40U  -SSI(Very resistant scale)  -Accuchecks ACHS  - diabetic diet     Hypertension, HFpEF  Stable. Echo 7/7/18 EF 66-70%, LV mild concentric hypertrophy, No left regional wall motion abnorm. SBP in ED elevated  to 140s-170s.  Pt on lisinopril 20mg BID and HCTZ 12.5mg daily at home. Pt also on Lasix 20mg daily PRN for lower extremity edema at home. Patient endorsing increased swelling in bilateral upper arms and abdominal.   -Continue home Lasix 20mg daily  -Continue Lisinopril 20mg BID  -Continue HCTZ 12.5 mg daily     GERD  Pt takes omeprazole 40mg daily and pepcid for breakthrough symptoms at home.   -Continue protonix 40mg daily     Morbid obesity- BMI 46  - nutritional counselling     Diet Diabetic   DVT Prophylaxis SQH   GI Prophylaxis Protonix   Code status Full   Disposition Stepdown>2MN      Point of Contact Nazia Martinez  Relationship: Daughter  [phone]     Signed By: Hope Talley MD     May 25, 2020

## 2020-05-25 NOTE — PROGRESS NOTES
0700: Bedside shift change report given to Yan Mi (oncoming nurse) by Jenny Bentley RN (offgoing nurse). Report included the following information SBAR, Kardex and Cardiac Rhythm NSR.     0800: Assessment completed. Administered scheduled meds. Patient requested to ambulate to bathroom and clean herself. No issues in gait. 2 L NC used to ambulate to bathroom. 1108: Patient's /81 as documented by CNA, recheck 177/68. [facility] aware and requested recheck. 1117: [facility]: Critical lactic acid of 5.6, [facility] made aware, no orders received. 1900: Bedside shift change report given to Jenny Bentley RN (oncoming nurse) by Shahnaz Moralez RN (offgoing nurse). Report included the following information SBAR, Kardex and Cardiac Rhythm NSR.

## 2020-05-25 NOTE — PROGRESS NOTES
[facility]. Pulmonary, Critical Care, and Sleep Medicine    Follow up Pulmonary progress note    Name: Radha Gordillo MRN: [id number]   : 1960 Hospital: [address] [place] [address] Dr   Date: 2020        This patient has been seen and evaluated at the request of Dr. Ramsey Woodard for COPD/Asthma exacerbation. IMPRESSION:   · Acute exacerbation of asthma/COPD: Patient has had multiple exacerbations in the last few months requiring corticosteroids  · Underlying poorly controlled severe persistent asthma with steroid dependence:  Pt has strong asthma component given significant bronchospasm to a wide variety of classic environmental triggers (I.e. fumes, fragrances, chemicals, smoke, mold, etc). Pt was on SCIT therapy many decades ago. · Chronic versus acute on chronic hypoxic and hypercapnic respiratory failure: pt on 2 L by nasal cannula chronically and on trilogy nightly  · Lactic acidosis: Patient had elevated lactic acid of 2.9 during her ER visit on 2020. Lactic acid increased to 5.6 this morning. Unclear source of tissue hypoperfusion versus lab aberrancy given that patient is normotensive without any vasopressor use. Patient has normal serum bicarb on metabolic panel as well as ABG. · Morbid obesity: Body mass index is 46.86 kg/m².    · SHERRIE/OHV: On trilogy (AVAPS-AE) as noted above  · Diabetes  · Hypertension  · Chronic steroid use: Patient on 10 mg every other day chronic prednisone     Patient Active Problem List   Diagnosis Code    Essential hypertension, benign I10    Diabetes mellitus, type 2 ([facility] E11.9    Esophageal reflux K21.9    SHERRIE on CPAP G47.33, Z99.89    COPD (chronic obstructive pulmonary disease) ([facility]) J44.9    Allergic rhinitis J30.9    COPD with acute exacerbation ([facility]) J44.1    Obesity, Class III, BMI 40-49.9 (morbid obesity) ([facility]) E66.01    Chest pain R07.9    Dyspnea R06.00    COPD exacerbation ([facility]) J44.1    Acute exacerbation of COPD with asthma ([facility] J44.1, H27.921    Diastolic CHF, chronic ([facility]) I50.32    Diverticulosis K57.90    COPD with acute bronchitis ([facility]) J44.0, J20.9    Hyperlipidemia E78.5    Type 2 diabetes with nephropathy ([facility]) E11.21    Bilateral carotid bruits R09.89    Palpitations R00.2    Acute exacerbation of chronic obstructive pulmonary disease (COPD) ([facility]) J44.1    Atelectasis of right lung J98.11      RECOMMENDATIONS:   Prednisone 60 mg daily. Given severe asthma component, with multiple exacerbations recently, would advise an extended taper over 6 to 8 weeks. 10 mg at a time every week . given underlying diabetes, patient will need intensive management of blood sugars to avoid hyperglycemia complications  Continue schedule bronchodilators: duonebs q4h, pulmicort nebs 1mg BID, and albuterol PRN  Please hold home bronchodilators (Advair HFA and Spiriva). Please resume on discharge -- in addition, please add dual ICS therapy with Flovent HFA 200mcg  Agree with ABx - Z-milka x 5 days, no PNA on CXR  Supplemental oxygen to maintain SpO2 >88%, wean as tolerated  Please assess for home oxygen need prior to discharge  Aggressive pulmonary toileting/bronchial hygiene  Frequent incentive spirometry  Aspiration precautions including elevating HOB >30deg  PT/OT, OOB, ambulate with assistance as tolerated  DVT ppx per primary service  As an outpatient, pt will likely need anti IL-5R or anti IL-4/13 therapy given hx of multiple exacerbations AND oral steroid dependence -- discussed with pt at length, including risks and benefits, pt agreeable. As an outpatient, patient may benefit from allergy referral for SCIT therapy  Will follow with Dr. Koki Talbert in clinic     Subjective:     05/25/20     Patient feels significantly improved. Started feeling well yesterday and today feels significantly better.   Getting out of bed, walking to the bathroom with some shortness of breath  Denies any chest tightness or wheezing  Not much cough  Denies any new symptoms-encouraged with improvement    HPI per Dr. Jamarcus Ventura  Patient is a 60 y.o. female with a past medical history of COPD/asthma overlap, SHERRIE/OHV on AVAPS via trilogy, morbid obesity, diabetes, hypertension, presented [facility] with acute onset of shortness of breath. Patient reports that symptoms started 2 days prior. Patient reports dyspnea was associated with minimal exertion and at rest, no alleviating factors including PRN albuterol via nebulizer or HFA. Patient reports that she was having frequent nocturnal awakenings despite using her trilogy nightly. Patient reports she normally sleeps well with trilogy machine. Patient reports that symptoms were present a week prior, patient was seen in the ER, and discharged on prednisone 50 mg x 3 days, however patient worsened after steroids were discontinued. Patient reports she is had multiple exacerbations over the last 2 months, symptoms worsened at the end of February, patient has been intermittently on burst of steroids at least 4 times in the last 2 months. Patient reports she is compliant with her Advair HFA 2 puffs twice daily and Spiriva once daily. Patient reports she quit smoking in 2007. Patient reports that she was a prior 1 pack/day smoker. 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 Patient reports that she has had some dyspnea relief after being given bronchodilators, patient was placed on continuous BiPAP which has worked well. Per nursing, patient was having pressure erythema over her face with continuous BiPAP. Patient reports having significant dyspnea with exposure to multiple triggers including fragrances, smells, pollen, dust, mold. Patient reported that she had to stay with her daughter and had to sit outside while Lysol  was used, due to excessive dyspnea. Patient reports she has been on chronic prednisone 10 mg every other day for over a year now.   Patient reports a remote history of asthma and allergies, used to see Dr. Bethany Arroyo, but reports she has not been seen for a few decades now, was on SCIT therapy at that time. Patient denies fevers, chills, night sweats, nausea, vomiting, diarrhea, hemoptysis, voice hoarseness. Past Medical History:   Diagnosis Date    Asthma     Chronic lung disease     COPD     Cystocele, midline     Diabetes mellitus ([address]     GERD (gastroesophageal reflux disease)     Hidradenitis suppurativa     Hyperlipidemia     Hypertension     SHERRIE on CPAP     CPAP    Stress incontinence       Past Surgical History:   Procedure Laterality Date    BREAST SURGERY PROCEDURE UNLISTED      Right breast benign tumor removal    HX APPENDECTOMY      HX CHOLECYSTECTOMY      HX DILATION AND CURETTAGE      Dysfunctional uterine bleeding, thought 2/2 fibroids    HX TUBAL LIGATION        Allergies   Allergen Reactions    Ancef [Cefazolin] Hives    Contrast Agent [Iodine] Anaphylaxis, Shortness of Breath and Swelling     Needs pre-medication for IV contrast with Benadryl, Solu-Medrol    Fish Containing Products Anaphylaxis     Pt states she had a severe allergic reaction at 7 y/o.  Statins-Hmg-Coa Reductase Inhibitors Myalgia    Metformin Other (comments)     Abdominal pain, diarrhea.     Codeine Other (comments)     Altered mental status      Current Facility-Administered Medications   Medication Dose Route Frequency    insulin glargine (LANTUS) injection 40 Units  40 Units SubCUTAneous QPM    [START ON 5/26/2020] predniSONE (DELTASONE) tablet 60 mg  60 mg Oral DAILY WITH BREAKFAST    aspirin delayed-release tablet 81 mg  81 mg Oral DAILY    budesonide (PULMICORT) 1,000 mcg/2 mL nebulizer susp  1,000 mcg Nebulization BID RT    fluticasone propionate (FLONASE) 50 mcg/actuation nasal spray 2 Spray  2 Spray Both Nostrils DAILY    lisinopriL (PRINIVIL, ZESTRIL) tablet 20 mg  20 mg Oral BID    montelukast (SINGULAIR) tablet 10 mg  10 mg Oral QHS  heparin (porcine) injection 5,000 Units  5,000 Units SubCUTAneous Q8H    insulin lispro (HUMALOG) injection   SubCUTAneous AC&HS    hydroCHLOROthiazide (MICROZIDE) capsule 12.5 mg  12.5 mg Oral DAILY    pantoprazole (PROTONIX) tablet 40 mg  40 mg Oral DAILY    furosemide (LASIX) tablet 20 mg  20 mg Oral DAILY    albuterol-ipratropium (DUO-NEB) 2.5 MG-0.5 MG/3 ML  3 mL Nebulization Q4H RT    azithromycin (ZITHROMAX) tablet 250 mg  250 mg Oral Once per day on        Review of Systems:  A comprehensive ROS was obtained as stated in HPI, all others are negative      Objective:   Vital Signs:    Visit Vitals  /66   Pulse 71   Temp 97.8 °F (36.6 °C)   Resp 26   Ht 5' 3\" (1.6 m)   Wt 120 kg (264 lb 8.8 oz)   SpO2 100%   Breastfeeding No   BMI 46.86 kg/m²       O2 Device: Room air   O2 Flow Rate (L/min): 2 l/min   Temp (24hrs), Av.8 °F (36.6 °C), Min:97.6 °F (36.4 °C), Max:98 °F (36.7 °C)       Intake/Output:   Last shift:      701 - 1900  In: 1958 [P.O.:360; I.V.:1475]  Out: -   Last 3 shifts: 1901 -  0700  In: 6739.5 [P.O.:600; I.V.:6139.5]  Out: 2950 [Urine:2950]    Intake/Output Summary (Last 24 hours) at 2020 1324  Last data filed at 2020 1200  Gross per 24 hour   Intake 5427 ml   Output 1750 ml   Net 3677 ml      Physical Exam:   General:  Alert, cooperative, no distress, appears stated age, obese, comfortable, taken off of BiPAP and placed on nasal cannula, comfortable, able to talk in full sentences. Head:  Normocephalic, without obvious abnormality, atraumatic. Eyes:  Conjunctivae/corneas clear. ANicteric, PERRLA, EOMI   Nose: Nares normal. Mucosa normal. No drainage or sinus tenderness. Throat: Lips, mucosa dry. NO thrush;, poor dentition, no oral lesions, Mallampati IV   Neck: Supple, symmetrical, trachea midline, no adenopathy, thyroid: no enlargment/tenderness/nodules, no carotid bruit and no JVD.  No crepitus   Back:   Symmetric, no curvature, no spine tenderness or flank pain   Lungs:    Poor air entry in bilateral bases, CTA BL, no wheezes/rales/rhonchi throughout all lung fields   Chest wall:  No tenderness or deformity. NO CREPITUS   Heart:  Regular rate and rhythm, S1, S2 normal, no murmur, click, rub or gallop. Abdomen:   Soft, obese, non-tender. Bowel sounds normal. No masses,  No organomegaly. No paradoxical motion   Extremities: normal, atraumatic, no cyanosis or edema. No clubbing   Pulses: 1-2+ and symmetric all extremities.    Skin: Skin color, texture, turgor normal. No rashes or lesions   Lymph nodes: Cervical, supraclavicular, and axillary nodes normal.   Neurologic: Grossly nonfocal, strength and coordination and sensation grossly intact throughout all extremities, AO x3          Data review:   Labs:  Recent Results (from the past 24 hour(s))   LACTIC ACID    Collection Time: 05/24/20  3:05 PM   Result Value Ref Range    Lactic acid 3.6 (HH) 0.4 - 2.0 MMOL/L   GLUCOSE, POC    Collection Time: 05/24/20  4:57 PM   Result Value Ref Range    Glucose (POC) 216 (H) 70 - 110 mg/dL   LACTIC ACID    Collection Time: 05/24/20  6:21 PM   Result Value Ref Range    Lactic acid 5.0 (HH) 0.4 - 2.0 MMOL/L   GLUCOSE, POC    Collection Time: 05/24/20  8:52 PM   Result Value Ref Range    Glucose (POC) 289 (H) 70 - 110 mg/dL   LACTIC ACID    Collection Time: 05/24/20 11:10 PM   Result Value Ref Range    Lactic acid 5.1 (HH) 0.4 - 2.0 MMOL/L   METABOLIC PANEL, BASIC    Collection Time: 05/25/20  4:19 AM   Result Value Ref Range    Sodium 141 136 - 145 mmol/L    Potassium 3.4 (L) 3.5 - 5.5 mmol/L    Chloride 112 (H) 100 - 111 mmol/L    CO2 23 21 - 32 mmol/L    Anion gap 6 3.0 - 18 mmol/L    Glucose 219 (H) 74 - 99 mg/dL    BUN 15 7.0 - 18 MG/DL    Creatinine 0.72 0.6 - 1.3 MG/DL    BUN/Creatinine ratio 21 (H) 12 - 20      GFR est AA >60 >60 ml/min/1.73m2    GFR est non-AA >60 >60 ml/min/1.73m2    Calcium 6.7 (L) 8.5 - 10.1 MG/DL   CBC WITH AUTOMATED DIFF    Collection Time: 05/25/20  4:19 AM   Result Value Ref Range    WBC 8.8 4.6 - 13.2 K/uL    RBC 3.46 (L) 4.20 - 5.30 M/uL    HGB 10.6 (L) 12.0 - 16.0 g/dL    HCT 31.6 (L) 35.0 - 45.0 %    MCV 91.3 74.0 - 97.0 FL    MCH 30.6 24.0 - 34.0 PG    MCHC 33.5 31.0 - 37.0 g/dL    RDW 14.3 11.6 - 14.5 %    PLATELET 593 382 - 763 K/uL    MPV 9.1 (L) 9.2 - 11.8 FL    NEUTROPHILS 88 (H) 40 - 73 %    LYMPHOCYTES 8 (L) 21 - 52 %    MONOCYTES 4 3 - 10 %    EOSINOPHILS 0 0 - 5 %    BASOPHILS 0 0 - 2 %    ABS. NEUTROPHILS 7.8 1.8 - 8.0 K/UL    ABS. LYMPHOCYTES 0.7 (L) 0.9 - 3.6 K/UL    ABS. MONOCYTES 0.4 0.05 - 1.2 K/UL    ABS. EOSINOPHILS 0.0 0.0 - 0.4 K/UL    ABS. BASOPHILS 0.0 0.0 - 0.1 K/UL    DF AUTOMATED     LACTIC ACID    Collection Time: 05/25/20  4:19 AM   Result Value Ref Range    Lactic acid 3.0 (HH) 0.4 - 2.0 MMOL/L   GLUCOSE, POC    Collection Time: 05/25/20  7:20 AM   Result Value Ref Range    Glucose (POC) 268 (H) 70 - 110 mg/dL   GLUCOSE, POC    Collection Time: 05/25/20 11:11 AM   Result Value Ref Range    Glucose (POC) 340 (H) 70 - 110 mg/dL     ABG:    No results found for: PH, PHI, PCO2, PCO2I, PO2, PO2I, HCO3, HCO3I, FIO2, FIO2I    PFT Results  (Last 48 hours)    None        Echo Results  (Last 48 hours)    None        Imaging:  I have personally reviewed the patients radiographs and have reviewed the reports:  Chest x-ray from 5/21/2020 shows clear lung fields bilaterally, with right middle lobe atelectasis linear, no masses, consolidations, effusions seen.   CT scan from 2/21/2018 reviewed personally shows scattered mild centrilobular emphysema mainly in upper lung fields, mild, along with right middle lobe and lower lobe linear atelectasis, mild, no masses, effusions, infiltrates seen  CXR Results  (Last 48 hours)    None        CT Results  (Last 48 hours)    None            High complexity decision making was performed during the evaluation of this patient at high risk for decompensation with multiple organ involvement     Above mentioned total time spent on reviewing the case/medical record/data/notes/EMR/patient examination/documentation/coordinating care with nurse/consultants, exclusive of procedures with complex decision making performed and > 50% time spent in face to face evaluation.            Jaime Greco MD     Pulmonary, Critical Care Medicine  [facility]

## 2020-05-25 NOTE — PROGRESS NOTES
Pt is alert and oriented x4. NSR on the monitor. Pt on room air but also wears CPAP at night. Pt tolerated well with no issues. Pt complains of CHAUDHARY. Pt ambulates to [facility] with minimal assistance. IVF infusing, IV steroids, and nebulizer per MAR. Continuing to trend lactic acids every 4 hours. ECHO result pending. Bed locked and in the lowest position. Call light within reach. Instructed to call for assistance. No events overnight.

## 2020-05-25 NOTE — PROGRESS NOTES
Problem: Falls - Risk of  Goal: *Absence of Falls  Description: Document Jeb Brennan Fall Risk and appropriate interventions in the flowsheet. Outcome: Progressing Towards Goal  Note: Fall Risk Interventions:            Medication Interventions: Patient to call before getting OOB, Teach patient to arise slowly                   Problem: Patient Education: Go to Patient Education Activity  Goal: Patient/Family Education  Outcome: Progressing Towards Goal     Problem: Diabetes Self-Management  Goal: *Disease process and treatment process  Description: Define diabetes and identify own type of diabetes; list 3 options for treating diabetes. Outcome: Progressing Towards Goal  Goal: *Incorporating nutritional management into lifestyle  Description: Describe effect of type, amount and timing of food on blood glucose; list 3 methods for planning meals. Outcome: Progressing Towards Goal  Goal: *Incorporating physical activity into lifestyle  Description: State effect of exercise on blood glucose levels. Outcome: Progressing Towards Goal  Goal: *Developing strategies to promote health/change behavior  Description: Define the ABC's of diabetes; identify appropriate screenings, schedule and personal plan for screenings. Outcome: Progressing Towards Goal  Goal: *Using medications safely  Description: State effect of diabetes medications on diabetes; name diabetes medication taking, action and side effects. Outcome: Progressing Towards Goal  Goal: *Monitoring blood glucose, interpreting and using results  Description: Identify recommended blood glucose targets  and personal targets. Outcome: Progressing Towards Goal  Goal: *Prevention, detection, treatment of acute complications  Description: List symptoms of hyper- and hypoglycemia; describe how to treat low blood sugar and actions for lowering  high blood glucose level.   Outcome: Progressing Towards Goal  Goal: *Prevention, detection and treatment of chronic complications  Description: Define the natural course of diabetes and describe the relationship of blood glucose levels to long term complications of diabetes.   Outcome: Progressing Towards Goal  Goal: *Developing strategies to address psychosocial issues  Description: Describe feelings about living with diabetes; identify support needed and support network  Outcome: Progressing Towards Goal  Goal: *Insulin pump training  Outcome: Progressing Towards Goal  Goal: *Sick day guidelines  Outcome: Progressing Towards Goal  Goal: *Patient Specific Goal (EDIT GOAL, INSERT TEXT)  Outcome: Progressing Towards Goal     Problem: Patient Education: Go to Patient Education Activity  Goal: Patient/Family Education  Outcome: Progressing Towards Goal     Problem: Patient Education: Go to Patient Education Activity  Goal: Patient/Family Education  Outcome: Progressing Towards Goal     Problem: Patient Education: Go to Patient Education Activity  Goal: Patient/Family Education  Outcome: Progressing Towards Goal     Problem: Breathing Pattern - Ineffective  Goal: *Absence of hypoxia  Outcome: Progressing Towards Goal  Goal: *Use of effective breathing techniques  Outcome: Progressing Towards Goal     Problem: Patient Education: Go to Patient Education Activity  Goal: Patient/Family Education  Outcome: Progressing Towards Goal

## 2020-05-26 VITALS
BODY MASS INDEX: 46.81 KG/M2 | SYSTOLIC BLOOD PRESSURE: 141 MMHG | DIASTOLIC BLOOD PRESSURE: 62 MMHG | HEIGHT: 63 IN | RESPIRATION RATE: 26 BRPM | HEART RATE: 65 BPM | WEIGHT: 264.2 LBS | OXYGEN SATURATION: 94 % | TEMPERATURE: 97.7 F

## 2020-05-26 LAB
ANION GAP SERPL CALC-SCNC: 7 MMOL/L (ref 3–18)
BASOPHILS # BLD: 0 K/UL (ref 0–0.1)
BASOPHILS NFR BLD: 0 % (ref 0–2)
BUN SERPL-MCNC: 23 MG/DL (ref 7–18)
BUN/CREAT SERPL: 21 (ref 12–20)
CALCIUM SERPL-MCNC: 9.3 MG/DL (ref 8.5–10.1)
CHLORIDE SERPL-SCNC: 104 MMOL/L (ref 100–111)
CO2 SERPL-SCNC: 28 MMOL/L (ref 21–32)
CREAT SERPL-MCNC: 1.07 MG/DL (ref 0.6–1.3)
DIFFERENTIAL METHOD BLD: ABNORMAL
EOSINOPHIL # BLD: 0 K/UL (ref 0–0.4)
EOSINOPHIL NFR BLD: 0 % (ref 0–5)
ERYTHROCYTE [DISTWIDTH] IN BLOOD BY AUTOMATED COUNT: 14 % (ref 11.6–14.5)
GLUCOSE BLD STRIP.AUTO-MCNC: 105 MG/DL (ref 70–110)
GLUCOSE BLD STRIP.AUTO-MCNC: 148 MG/DL (ref 70–110)
GLUCOSE SERPL-MCNC: 224 MG/DL (ref 74–99)
HCT VFR BLD AUTO: 34 % (ref 35–45)
HGB BLD-MCNC: 11.6 G/DL (ref 12–16)
LACTATE SERPL-SCNC: 2.2 MMOL/L (ref 0.4–2)
LACTATE SERPL-SCNC: 3.4 MMOL/L (ref 0.4–2)
LYMPHOCYTES # BLD: 1.6 K/UL (ref 0.9–3.6)
LYMPHOCYTES NFR BLD: 13 % (ref 21–52)
MCH RBC QN AUTO: 30.9 PG (ref 24–34)
MCHC RBC AUTO-ENTMCNC: 34.1 G/DL (ref 31–37)
MCV RBC AUTO: 90.7 FL (ref 74–97)
MONOCYTES # BLD: 0.9 K/UL (ref 0.05–1.2)
MONOCYTES NFR BLD: 8 % (ref 3–10)
NEUTS SEG # BLD: 9.5 K/UL (ref 1.8–8)
NEUTS SEG NFR BLD: 79 % (ref 40–73)
PLATELET # BLD AUTO: 309 K/UL (ref 135–420)
PMV BLD AUTO: 8.9 FL (ref 9.2–11.8)
POTASSIUM SERPL-SCNC: 3.6 MMOL/L (ref 3.5–5.5)
RBC # BLD AUTO: 3.75 M/UL (ref 4.2–5.3)
SODIUM SERPL-SCNC: 139 MMOL/L (ref 136–145)
WBC # BLD AUTO: 12.1 K/UL (ref 4.6–13.2)

## 2020-05-26 PROCEDURE — 97116 GAIT TRAINING THERAPY: CPT

## 2020-05-26 PROCEDURE — 80048 BASIC METABOLIC PNL TOTAL CA: CPT

## 2020-05-26 PROCEDURE — 83605 ASSAY OF LACTIC ACID: CPT

## 2020-05-26 PROCEDURE — 85025 COMPLETE CBC W/AUTO DIFF WBC: CPT

## 2020-05-26 PROCEDURE — 74011250637 HC RX REV CODE- 250/637: Performed by: STUDENT IN AN ORGANIZED HEALTH CARE EDUCATION/TRAINING PROGRAM

## 2020-05-26 PROCEDURE — 77010033678 HC OXYGEN DAILY

## 2020-05-26 PROCEDURE — 94660 CPAP INITIATION&MGMT: CPT

## 2020-05-26 PROCEDURE — 97110 THERAPEUTIC EXERCISES: CPT

## 2020-05-26 PROCEDURE — 94640 AIRWAY INHALATION TREATMENT: CPT

## 2020-05-26 PROCEDURE — 74011636637 HC RX REV CODE- 636/637: Performed by: STUDENT IN AN ORGANIZED HEALTH CARE EDUCATION/TRAINING PROGRAM

## 2020-05-26 PROCEDURE — 82962 GLUCOSE BLOOD TEST: CPT

## 2020-05-26 PROCEDURE — 94762 N-INVAS EAR/PLS OXIMTRY CONT: CPT

## 2020-05-26 PROCEDURE — 74011000250 HC RX REV CODE- 250: Performed by: STUDENT IN AN ORGANIZED HEALTH CARE EDUCATION/TRAINING PROGRAM

## 2020-05-26 PROCEDURE — 36415 COLL VENOUS BLD VENIPUNCTURE: CPT

## 2020-05-26 PROCEDURE — 74011250636 HC RX REV CODE- 250/636: Performed by: STUDENT IN AN ORGANIZED HEALTH CARE EDUCATION/TRAINING PROGRAM

## 2020-05-26 RX ORDER — INSULIN GLARGINE 100 [IU]/ML
40 INJECTION, SOLUTION SUBCUTANEOUS
Qty: 28 UNITS | Refills: 0 | Status: SHIPPED | OUTPATIENT
Start: 2020-05-26 | End: 2020-07-18

## 2020-05-26 RX ORDER — PREDNISONE 20 MG/1
TABLET ORAL
Qty: 80 TAB | Refills: 0 | Status: SHIPPED | OUTPATIENT
Start: 2020-05-26 | End: 2020-07-18

## 2020-05-26 RX ORDER — PANTOPRAZOLE SODIUM 40 MG/1
40 TABLET, DELAYED RELEASE ORAL DAILY
Qty: 30 TAB | Refills: 0 | Status: SHIPPED | OUTPATIENT
Start: 2020-05-27 | End: 2020-07-18

## 2020-05-26 RX ORDER — FLUTICASONE PROPIONATE 220 UG/1
1 AEROSOL, METERED RESPIRATORY (INHALATION) 2 TIMES DAILY
Qty: 1 INHALER | Refills: 0 | Status: SHIPPED | OUTPATIENT
Start: 2020-05-26 | End: 2020-07-07

## 2020-05-26 RX ADMIN — AZITHROMYCIN MONOHYDRATE 250 MG: 250 TABLET ORAL at 08:38

## 2020-05-26 RX ADMIN — FLUTICASONE PROPIONATE 2 SPRAY: 50 SPRAY, METERED NASAL at 08:33

## 2020-05-26 RX ADMIN — BUDESONIDE 1000 MCG: 1 SUSPENSION RESPIRATORY (INHALATION) at 07:56

## 2020-05-26 RX ADMIN — IPRATROPIUM BROMIDE AND ALBUTEROL SULFATE 3 ML: .5; 3 SOLUTION RESPIRATORY (INHALATION) at 13:05

## 2020-05-26 RX ADMIN — LISINOPRIL 20 MG: 20 TABLET ORAL at 08:32

## 2020-05-26 RX ADMIN — IPRATROPIUM BROMIDE AND ALBUTEROL SULFATE 3 ML: .5; 3 SOLUTION RESPIRATORY (INHALATION) at 07:56

## 2020-05-26 RX ADMIN — ASPIRIN 81 MG: 81 TABLET, COATED ORAL at 08:32

## 2020-05-26 RX ADMIN — PANTOPRAZOLE SODIUM 40 MG: 40 TABLET, DELAYED RELEASE ORAL at 08:32

## 2020-05-26 RX ADMIN — IPRATROPIUM BROMIDE AND ALBUTEROL SULFATE 3 ML: .5; 3 SOLUTION RESPIRATORY (INHALATION) at 03:50

## 2020-05-26 RX ADMIN — PREDNISONE 60 MG: 20 TABLET ORAL at 08:32

## 2020-05-26 RX ADMIN — HEPARIN SODIUM 5000 UNITS: 5000 INJECTION INTRAVENOUS; SUBCUTANEOUS at 06:00

## 2020-05-26 RX ADMIN — FUROSEMIDE 20 MG: 20 TABLET ORAL at 08:32

## 2020-05-26 RX ADMIN — IPRATROPIUM BROMIDE AND ALBUTEROL SULFATE 3 ML: .5; 3 SOLUTION RESPIRATORY (INHALATION) at 00:29

## 2020-05-26 RX ADMIN — HYDROCHLOROTHIAZIDE 12.5 MG: 12.5 CAPSULE ORAL at 08:32

## 2020-05-26 NOTE — PROGRESS NOTES
Problem: Mobility Impaired (Adult and Pediatric)  Goal: *Acute Goals and Plan of Care (Insert Text)  Description: Physical Therapy Goals  Initiated 5/22/2020 and to be accomplished within 7 day(s)  1. Patient will move from supine to sit and sit to supine , scoot up and down and roll side to side in bed with independence. 2.  Patient will transfer from bed to chair and chair to bed with modified independence using the least restrictive device. 3.  Patient will perform sit to stand with modified independence. 4.  Patient will ambulate with modified independence for 25 feet with the least restrictive device. 5.  Patient will ascend/descend 2 stairs with 1 handrail(s) with supervision/set-up. PLOF: Pt lives with family in a 2 story home with 2 ESTEVAN, lives on the first floor, on 2L home O2, has a rollator that she uses some. Outcome: Progressing Towards Goal     PHYSICAL THERAPY TREATMENT    Patient: Ramón Chacon (35 y.o. female)  Date: 5/26/2020  Diagnosis: COPD with acute exacerbation ([address] [J44.1]   COPD with acute exacerbation ([facility]       Precautions: Fall    ASSESSMENT:  Pt. Was received sitting up in bed. She was agreeable to participate with PT today. SPO2% at rest was 97%. She performed LAQ and seated marches with good trunk control. She was modified independent for sit to stand transfer. She ambulated 200 feet with no AD and CGA. She has decreased step length during ambulation but had no LOB. She had SOB after ambulation and SPO2% decreased to 94%. After a few minutes SPO2% recovered back to 97%. She was also able to ascend/descend 4 steps today with bilateral hand rails. During descending she side stepped with step to pattern with left LE first. She was left sitting in bed with call bell within reach.      Progression toward goals:   [x]      Improving appropriately and progressing toward goals  []      Improving slowly and progressing toward goals  []      Not making progress toward goals and plan of care will be adjusted     PLAN:  Patient continues to benefit from skilled intervention to address the above impairments. Continue treatment per established plan of care. Discharge Recommendations:  Home Health  Further Equipment Recommendations for Discharge:  rollator     SUBJECTIVE:   Patient stated I'm doing pretty good today.     OBJECTIVE DATA SUMMARY:   Critical Behavior:  Neurologic State: Alert, Eyes open spontaneously  Orientation Level: Oriented X4  Cognition: Appropriate decision making, Follows commands  Safety/Judgement: Fall prevention, Awareness of environment  Functional Mobility Training:   Ambulation/Gait Training:  Distance (ft): 200 Feet (ft)  Assistive Device: Other (comment)(none)  Ambulation - Level of Assistance: Contact guard assistance        Gait Abnormalities: Decreased step clearance  Step Length: Left shortened;Right shortened  Stairs:  Number of Stairs Trained: 4  Stairs - Level of Assistance: Contact guard assistance  Rail Use: Both  Therapeutic Exercises:         EXERCISE   Sets   Reps   Active Active Assist   Passive Self ROM   Comments   Ankle Pumps    [] [] [] []    Quad Sets/Glut Sets    [] [] [] [] Hold for 5 secs   Hamstring Sets   [] [] [] []    Short Arc Quads   [] [] [] []    Heel Slides   [] [] [] []    Straight Leg Raises   [] [] [] []    Hip Add   [] [] [] [] Hold for 5 secs, w/ pillow squeeze   Long Arc Quads  10 [x] [] [] []    Seated Marching  10 [x] [] [] []    Standing Marching   [] [] [] []    Sit to stand  5 [x] [] [] []        Pain:  Pain level pre-treatment: 0/10  Pain level post-treatment: 0/10   Pain Intervention(s): Medication (see MAR); Rest, Ice, Repositioning  Response to intervention: Nurse notified, See doc flow    Activity Tolerance:   Fair    Please refer to the flowsheet for vital signs taken during this treatment.   After treatment:   [] Patient left in no apparent distress sitting up in chair  [x] Patient left in no apparent distress in bed  [x] Call bell left within reach  [x] Nursing notified  [] Caregiver present  [] Bed alarm activated  [] SCDs applied      COMMUNICATION/EDUCATION:   [x]         Role of Physical Therapy in the acute care setting. [x]         Fall prevention education was provided and the patient/caregiver indicated understanding. [x]         Patient/family have participated as able in working toward goals and plan of care. [x]         Patient/family agree to work toward stated goals and plan of care. []         Patient understands intent and goals of therapy, but is neutral about his/her participation.   []         Patient is unable to participate in stated goals/plan of care: ongoing with therapy staff.  []         Other:        Sb Winters, PT   Time Calculation: 26 mins

## 2020-05-26 NOTE — PROGRESS NOTES
Bedside report from [facility]. VSS, pt in NAD. Bed locked and in the lowest position. Call light within reach. Instructed to call for assistance.

## 2020-05-26 NOTE — PROGRESS NOTES
[facility]. Pulmonary, Critical Care, and Sleep Medicine    Follow up Pulmonary progress note    Name: Hugo Bates MRN: [id number]   : 1960 Hospital: [address] [place] [address] Dr   Date: 2020        This patient has been seen and evaluated at the request of Dr. Ruslan Can for COPD/Asthma exacerbation. IMPRESSION:   · Acute exacerbation of asthma/COPD: Patient has had multiple exacerbations in the last few months requiring corticosteroids  · Underlying poorly controlled severe persistent asthma with steroid dependence:  Pt has strong asthma component given significant bronchospasm to a wide variety of classic environmental triggers (I.e. fumes, fragrances, chemicals, smoke, mold, etc). Pt was on SCIT therapy many decades ago. · Chronic versus acute on chronic hypoxic and hypercapnic respiratory failure: pt on 2 L by nasal cannula chronically and on trilogy nightly  · Lactic acidosis: Patient had elevated lactic acid of 2.9 during her ER visit on 2020. Lactic acid increased to 5.6 this morning. Unclear source of tissue hypoperfusion versus lab aberrancy given that patient is normotensive without any vasopressor use. Patient has normal serum bicarb on metabolic panel as well as ABG. · Morbid obesity: Body mass index is 46.8 kg/m².    · SHERRIE/OHV: On trilogy (AVAPS-AE) as noted above  · Diabetes  · Hypertension  · Chronic steroid use: Patient on 10 mg every other day chronic prednisone     Patient Active Problem List   Diagnosis Code    Essential hypertension, benign I10    Diabetes mellitus, type 2 ([facility] E11.9    Esophageal reflux K21.9    SHERRIE on CPAP G47.33, Z99.89    COPD (chronic obstructive pulmonary disease) ([facility]) J44.9    Allergic rhinitis J30.9    COPD with acute exacerbation ([facility]) J44.1    Obesity, Class III, BMI 40-49.9 (morbid obesity) ([facility]) E66.01    Chest pain R07.9    Dyspnea R06.00    COPD exacerbation ([facility]) J44.1    Acute exacerbation of COPD with asthma ([facility] J44.1, X73.713    Diastolic CHF, chronic ([facility]) I50.32    Diverticulosis K57.90    COPD with acute bronchitis ([facility]) J44.0, J20.9    Hyperlipidemia E78.5    Type 2 diabetes with nephropathy ([facility]) E11.21    Bilateral carotid bruits R09.89    Palpitations R00.2    Acute exacerbation of chronic obstructive pulmonary disease (COPD) ([facility]) J44.1    Atelectasis of right lung J98.11      RECOMMENDATIONS:   Prednisone 60 mg daily. Given severe asthma component, with multiple exacerbations recently, would advise an extended taper over 6 to 8 weeks. 10 mg at a time every week . given underlying diabetes, patient will need intensive management of blood sugars to avoid hyperglycemia complications  Continue schedule bronchodilators: duonebs q4h, pulmicort nebs 1mg BID, and albuterol PRN  Please hold home bronchodilators (Advair HFA and Spiriva). Please resume on discharge -- in addition, please add dual ICS therapy with Flovent HFA 200mcg  Agree with ABx - Z-milka x 5 days, no PNA on CXR  Supplemental oxygen to maintain SpO2 >88%, wean as tolerated  Please assess for home oxygen need prior to discharge  Aggressive pulmonary toileting/bronchial hygiene  Frequent incentive spirometry  Aspiration precautions including elevating HOB >30deg  PT/OT, OOB, ambulate with assistance as tolerated  DVT ppx per primary service  As an outpatient, pt will likely need anti IL-5R or anti IL-4/13 therapy given hx of multiple exacerbations AND oral steroid dependence -- discussed with pt at length, including risks and benefits, pt agreeable. As an outpatient, patient may benefit from allergy referral for SCIT therapy  Will follow with Dr. Liz Tapia in clinic     Subjective:     05/26/20     Patient feels significantly improved.   Slept with BiPAP on  Getting out of bed, walking to the bathroom with some shortness of breath  Denies any chest tightness or wheezing  Not much cough  Denies any new symptoms-encouraged with improvement and anxious to go home    HPI per Dr. Heidi Savage  Patient is a 60 y.o. female with a past medical history of COPD/asthma overlap, SHERRIE/OHV on AVAPS via trilogy, morbid obesity, diabetes, hypertension, presented to [facility] with acute onset of shortness of breath. Patient reports that symptoms started 2 days prior. Patient reports dyspnea was associated with minimal exertion and at rest, no alleviating factors including PRN albuterol via nebulizer or HFA. Patient reports that she was having frequent nocturnal awakenings despite using her trilogy nightly. Patient reports she normally sleeps well with trilogy machine. Patient reports that symptoms were present a week prior, patient was seen in the ER, and discharged on prednisone 50 mg x 3 days, however patient worsened after steroids were discontinued. Patient reports she is had multiple exacerbations over the last 2 months, symptoms worsened at the end of February, patient has been intermittently on burst of steroids at least 4 times in the last 2 months. Patient reports she is compliant with her Advair HFA 2 puffs twice daily and Spiriva once daily. Patient reports she quit smoking in 2007. Patient reports that she was a prior 1 pack/day smoker. 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 Patient reports that she has had some dyspnea relief after being given bronchodilators, patient was placed on continuous BiPAP which has worked well. Per nursing, patient was having pressure erythema over her face with continuous BiPAP. Patient reports having significant dyspnea with exposure to multiple triggers including fragrances, smells, pollen, dust, mold. Patient reported that she had to stay with her daughter and had to sit outside while Lysol  was used, due to excessive dyspnea. Patient reports she has been on chronic prednisone 10 mg every other day for over a year now.   Patient reports a remote history of asthma and allergies, used to see Dr. Aissatou Adame, but reports she has not been seen for a few decades now, was on SCIT therapy at that time. Patient denies fevers, chills, night sweats, nausea, vomiting, diarrhea, hemoptysis, voice hoarseness. Past Medical History:   Diagnosis Date    Asthma     Chronic lung disease     COPD     Cystocele, midline     Diabetes mellitus ([address]     GERD (gastroesophageal reflux disease)     Hidradenitis suppurativa     Hyperlipidemia     Hypertension     SHERRIE on CPAP     CPAP    Stress incontinence       Past Surgical History:   Procedure Laterality Date    BREAST SURGERY PROCEDURE UNLISTED      Right breast benign tumor removal    HX APPENDECTOMY      HX CHOLECYSTECTOMY      HX DILATION AND CURETTAGE      Dysfunctional uterine bleeding, thought 2/2 fibroids    HX TUBAL LIGATION        Allergies   Allergen Reactions    Ancef [Cefazolin] Hives    Contrast Agent [Iodine] Anaphylaxis, Shortness of Breath and Swelling     Needs pre-medication for IV contrast with Benadryl, Solu-Medrol    Fish Containing Products Anaphylaxis     Pt states she had a severe allergic reaction at 7 y/o.  Statins-Hmg-Coa Reductase Inhibitors Myalgia    Metformin Other (comments)     Abdominal pain, diarrhea.     Codeine Other (comments)     Altered mental status      Current Facility-Administered Medications   Medication Dose Route Frequency    insulin glargine (LANTUS) injection 40 Units  40 Units SubCUTAneous QPM    predniSONE (DELTASONE) tablet 60 mg  60 mg Oral DAILY WITH BREAKFAST    aspirin delayed-release tablet 81 mg  81 mg Oral DAILY    budesonide (PULMICORT) 1,000 mcg/2 mL nebulizer susp  1,000 mcg Nebulization BID RT    fluticasone propionate (FLONASE) 50 mcg/actuation nasal spray 2 Spray  2 Spray Both Nostrils DAILY    lisinopriL (PRINIVIL, ZESTRIL) tablet 20 mg  20 mg Oral BID    montelukast (SINGULAIR) tablet 10 mg  10 mg Oral QHS    heparin (porcine) injection 5,000 Units  5,000 Units SubCUTAneous Q8H    insulin lispro (HUMALOG) injection   SubCUTAneous AC&HS    hydroCHLOROthiazide (MICROZIDE) capsule 12.5 mg  12.5 mg Oral DAILY    pantoprazole (PROTONIX) tablet 40 mg  40 mg Oral DAILY    furosemide (LASIX) tablet 20 mg  20 mg Oral DAILY    albuterol-ipratropium (DUO-NEB) 2.5 MG-0.5 MG/3 ML  3 mL Nebulization Q4H RT    azithromycin (ZITHROMAX) tablet 250 mg  250 mg Oral Once per day on        Review of Systems:  A comprehensive ROS was obtained as stated in HPI, all others are negative      Objective:   Vital Signs:    Visit Vitals  /76   Pulse 60   Temp 97.6 °F (36.4 °C)   Resp 16   Ht 5' 3\" (1.6 m)   Wt 119.8 kg (264 lb 3.2 oz)   SpO2 100%   Breastfeeding No   BMI 46.80 kg/m²       O2 Device: Nasal cannula   O2 Flow Rate (L/min): 2 l/min   Temp (24hrs), Av.7 °F (36.5 °C), Min:97.5 °F (36.4 °C), Max:98.3 °F (36.8 °C)       Intake/Output:   Last shift:       07 -  190  In: 360 [P.O.:360]  Out: -   Last 3 shifts: 1901 -  0700  In: 8387 [P.O.:720; I.V.:4107]  Out: 4850 [Urine:4850]    Intake/Output Summary (Last 24 hours) at 2020 1003  Last data filed at 2020 0951  Gross per 24 hour   Intake 1320 ml   Output 2400 ml   Net -1080 ml      Physical Exam:   General:  Alert, cooperative, no distress, appears stated age, obese, comfortable,    Head:  Normocephalic, without obvious abnormality, atraumatic. Eyes:  Conjunctivae/corneas clear. ANicteric, PERRLA, EOMI   Nose: Nares normal. Mucosa normal. No drainage or sinus tenderness. Throat: Lips, mucosa dry. NO thrush;, poor dentition, no oral lesions, Mallampati IV   Neck: Supple, symmetrical, trachea midline, no adenopathy, thyroid: no enlargment/tenderness/nodules, no carotid bruit and no JVD.  No crepitus   Back:   Symmetric, no curvature, no spine tenderness or flank pain   Lungs:    Poor air entry in bilateral bases, CTA BL, no wheezes/rales/rhonchi throughout all lung fields   Chest wall:  No tenderness or deformity. NO CREPITUS   Heart:  Regular rate and rhythm, S1, S2 normal, no murmur, click, rub or gallop. Abdomen:   Soft, obese, non-tender. Bowel sounds normal. No masses,  No organomegaly. No paradoxical motion   Extremities: normal, atraumatic, no cyanosis or edema. No clubbing   Pulses: 1-2+ and symmetric all extremities.    Skin: Skin color, texture, turgor normal. No rashes or lesions   Lymph nodes: Cervical, supraclavicular, and axillary nodes normal.   Neurologic: Grossly nonfocal, strength and coordination and sensation grossly intact throughout all extremities, AO x3          Data review:   Labs:  Recent Results (from the past 24 hour(s))   GLUCOSE, POC    Collection Time: 05/25/20 11:11 AM   Result Value Ref Range    Glucose (POC) 340 (H) 70 - 110 mg/dL   LACTIC ACID    Collection Time: 05/25/20  1:55 PM   Result Value Ref Range    Lactic acid 5.6 (HH) 0.4 - 2.0 MMOL/L   GLUCOSE, POC    Collection Time: 05/25/20  3:53 PM   Result Value Ref Range    Glucose (POC) 282 (H) 70 - 110 mg/dL   POTASSIUM    Collection Time: 05/25/20  4:10 PM   Result Value Ref Range    Potassium 4.3 3.5 - 5.5 mmol/L   LACTIC ACID    Collection Time: 05/25/20  6:40 PM   Result Value Ref Range    Lactic acid 5.0 (HH) 0.4 - 2.0 MMOL/L   GLUCOSE, POC    Collection Time: 05/25/20  9:00 PM   Result Value Ref Range    Glucose (POC) 296 (H) 70 - 110 mg/dL   LACTIC ACID    Collection Time: 05/26/20 12:45 AM   Result Value Ref Range    Lactic acid 3.4 (HH) 0.4 - 2.0 MMOL/L   METABOLIC PANEL, BASIC    Collection Time: 05/26/20 12:46 AM   Result Value Ref Range    Sodium 139 136 - 145 mmol/L    Potassium 3.6 3.5 - 5.5 mmol/L    Chloride 104 100 - 111 mmol/L    CO2 28 21 - 32 mmol/L    Anion gap 7 3.0 - 18 mmol/L    Glucose 224 (H) 74 - 99 mg/dL    BUN 23 (H) 7.0 - 18 MG/DL    Creatinine 1.07 0.6 - 1.3 MG/DL    BUN/Creatinine ratio 21 (H) 12 - 20      GFR est AA >60 >60 ml/min/1.73m2    GFR est non-AA 52 (L) >60 ml/min/1.73m2    Calcium 9.3 8.5 - 10.1 MG/DL   CBC WITH AUTOMATED DIFF    Collection Time: 05/26/20 12:46 AM   Result Value Ref Range    WBC 12.1 4.6 - 13.2 K/uL    RBC 3.75 (L) 4.20 - 5.30 M/uL    HGB 11.6 (L) 12.0 - 16.0 g/dL    HCT 34.0 (L) 35.0 - 45.0 %    MCV 90.7 74.0 - 97.0 FL    MCH 30.9 24.0 - 34.0 PG    MCHC 34.1 31.0 - 37.0 g/dL    RDW 14.0 11.6 - 14.5 %    PLATELET 038 204 - 139 K/uL    MPV 8.9 (L) 9.2 - 11.8 FL    NEUTROPHILS 79 (H) 40 - 73 %    LYMPHOCYTES 13 (L) 21 - 52 %    MONOCYTES 8 3 - 10 %    EOSINOPHILS 0 0 - 5 %    BASOPHILS 0 0 - 2 %    ABS. NEUTROPHILS 9.5 (H) 1.8 - 8.0 K/UL    ABS. LYMPHOCYTES 1.6 0.9 - 3.6 K/UL    ABS. MONOCYTES 0.9 0.05 - 1.2 K/UL    ABS. EOSINOPHILS 0.0 0.0 - 0.4 K/UL    ABS. BASOPHILS 0.0 0.0 - 0.1 K/UL    DF AUTOMATED     LACTIC ACID    Collection Time: 05/26/20  5:39 AM   Result Value Ref Range    Lactic acid 2.2 (HH) 0.4 - 2.0 MMOL/L   GLUCOSE, POC    Collection Time: 05/26/20  7:37 AM   Result Value Ref Range    Glucose (POC) 105 70 - 110 mg/dL     ABG:    No results found for: PH, PHI, PCO2, PCO2I, PO2, PO2I, HCO3, HCO3I, FIO2, FIO2I    PFT Results  (Last 48 hours)    None        Echo Results  (Last 48 hours)    None        Imaging:  I have personally reviewed the patients radiographs and have reviewed the reports:  Chest x-ray from 5/21/2020 shows clear lung fields bilaterally, with right middle lobe atelectasis linear, no masses, consolidations, effusions seen.   CT scan from 2/21/2018 reviewed personally shows scattered mild centrilobular emphysema mainly in upper lung fields, mild, along with right middle lobe and lower lobe linear atelectasis, mild, no masses, effusions, infiltrates seen  CXR Results  (Last 48 hours)    None        CT Results  (Last 48 hours)    None            High complexity decision making was performed during the evaluation of this patient at high risk for decompensation with multiple organ involvement     Above mentioned total time spent on reviewing the case/medical record/data/notes/EMR/patient examination/documentation/coordinating care with nurse/consultants, exclusive of procedures with complex decision making performed and > 50% time spent in face to face evaluation.            Unknown Caleb DENNEY     Pulmonary, Critical Care Medicine  [facility]

## 2020-05-26 NOTE — PROGRESS NOTES
Progress Note    Patient: Kerrie Pichardo MRN: [id number]  SSN: [id number]    YOB: 1960  Age: 60 y.o. Sex: female      Admit Date: 5/21/2020    LOS: 4 days     Subjective:     Pt sleeping on BIPAP this AM. She woke up easily when I entered the room. She had a headache which she received tylenol for and was concerned about her lactic acid. Review of Systems   Constitutional: Negative for chills and fever. HENT: Negative for congestion and sore throat. Eyes: Negative for blurred vision. Respiratory: Positive for shortness of breath. Negative for cough. Cardiovascular: Negative for chest pain and palpitations. Gastrointestinal: Positive for abdominal pain. Negative for constipation, diarrhea, nausea and vomiting. Musculoskeletal: Negative for back pain and joint pain. Neurological: Positive for headaches. Negative for dizziness. Objective:     Vitals:    05/26/20 0600 05/26/20 0736 05/26/20 0738 05/26/20 0800   BP:  148/76 148/76    Pulse:  64 60    Resp:  17 16    Temp:  97.6 °F (36.4 °C)     SpO2:  100% 100% 100%   Weight: 119.8 kg (264 lb 3.2 oz)      Height:            Intake and Output:  Current Shift: No intake/output data recorded. Last three shifts: 05/24 1901 - 05/26 0700  In: 4827 [P.O.:720; I.V.:4107]  Out: 4850 [Urine:4850]    Physical Exam:   General:  Alert and responsive, On BIPAP this AM  CV:  RRR, no murmurs/rubs/gallops. Resp: Improved air movent throughout bilateral posterior lung fields and diffuse mild wheezes. Abd:  Soft, obese, diffusly tender in lower abdomen. No rebound or guarding. Reducible umbilical hernia, BS+  Neuro: A+Ox3. Ext:  2+ radial and dp pulses bilaterally. Trace edema in Lower ext. Skin:  No rashes, lesions, or ulcers. Good turgor.     Lab/Data Review:  Recent Results (from the past 24 hour(s))   GLUCOSE, POC    Collection Time: 05/25/20 11:11 AM   Result Value Ref Range    Glucose (POC) 340 (H) 70 - 110 mg/dL LACTIC ACID    Collection Time: 05/25/20  1:55 PM   Result Value Ref Range    Lactic acid 5.6 (HH) 0.4 - 2.0 MMOL/L   GLUCOSE, POC    Collection Time: 05/25/20  3:53 PM   Result Value Ref Range    Glucose (POC) 282 (H) 70 - 110 mg/dL   POTASSIUM    Collection Time: 05/25/20  4:10 PM   Result Value Ref Range    Potassium 4.3 3.5 - 5.5 mmol/L   LACTIC ACID    Collection Time: 05/25/20  6:40 PM   Result Value Ref Range    Lactic acid 5.0 (HH) 0.4 - 2.0 MMOL/L   GLUCOSE, POC    Collection Time: 05/25/20  9:00 PM   Result Value Ref Range    Glucose (POC) 296 (H) 70 - 110 mg/dL   LACTIC ACID    Collection Time: 05/26/20 12:45 AM   Result Value Ref Range    Lactic acid 3.4 (HH) 0.4 - 2.0 MMOL/L   METABOLIC PANEL, BASIC    Collection Time: 05/26/20 12:46 AM   Result Value Ref Range    Sodium 139 136 - 145 mmol/L    Potassium 3.6 3.5 - 5.5 mmol/L    Chloride 104 100 - 111 mmol/L    CO2 28 21 - 32 mmol/L    Anion gap 7 3.0 - 18 mmol/L    Glucose 224 (H) 74 - 99 mg/dL    BUN 23 (H) 7.0 - 18 MG/DL    Creatinine 1.07 0.6 - 1.3 MG/DL    BUN/Creatinine ratio 21 (H) 12 - 20      GFR est AA >60 >60 ml/min/1.73m2    GFR est non-AA 52 (L) >60 ml/min/1.73m2    Calcium 9.3 8.5 - 10.1 MG/DL   CBC WITH AUTOMATED DIFF    Collection Time: 05/26/20 12:46 AM   Result Value Ref Range    WBC 12.1 4.6 - 13.2 K/uL    RBC 3.75 (L) 4.20 - 5.30 M/uL    HGB 11.6 (L) 12.0 - 16.0 g/dL    HCT 34.0 (L) 35.0 - 45.0 %    MCV 90.7 74.0 - 97.0 FL    MCH 30.9 24.0 - 34.0 PG    MCHC 34.1 31.0 - 37.0 g/dL    RDW 14.0 11.6 - 14.5 %    PLATELET 201 846 - 165 K/uL    MPV 8.9 (L) 9.2 - 11.8 FL    NEUTROPHILS 79 (H) 40 - 73 %    LYMPHOCYTES 13 (L) 21 - 52 %    MONOCYTES 8 3 - 10 %    EOSINOPHILS 0 0 - 5 %    BASOPHILS 0 0 - 2 %    ABS. NEUTROPHILS 9.5 (H) 1.8 - 8.0 K/UL    ABS. LYMPHOCYTES 1.6 0.9 - 3.6 K/UL    ABS. MONOCYTES 0.9 0.05 - 1.2 K/UL    ABS. EOSINOPHILS 0.0 0.0 - 0.4 K/UL    ABS.  BASOPHILS 0.0 0.0 - 0.1 K/UL    DF AUTOMATED     LACTIC ACID Collection Time: 05/26/20  5:39 AM   Result Value Ref Range    Lactic acid 2.2 (HH) 0.4 - 2.0 MMOL/L   GLUCOSE, POC    Collection Time: 05/26/20  7:37 AM   Result Value Ref Range    Glucose (POC) 105 70 - 110 mg/dL     Xr Chest Port    Result Date: 5/22/2020  Impression:  1. No acute finding. 2. Stable bibasilar streaky opacities, probably scarring or atelectasis. 3. Hyperinflation/emphysema. ECHO 5/24/20:    Estimated left ventricular ejection fraction is 65 - 70%. Visually measured ejection fraction. No regional wall motion abnormality noted. Moderate (grade 2) left ventricular diastolic dysfunction. Mildly dilated left atrium. Pulmonary hypertension. Pulmonary arterial systolic pressure is 61 mmHg. Severely elevated central venous pressure (15+ mmHg); IVC diameter is larger than 21 mm and collapses less than 50% with respiration. Assessment/Plan:   Lucian De Dios is a 60 y.o. female with PMH of COPD on home O2, IDDM2, HTN, HFpEF, SHERRIE on CPAP, GERD, allergic rhinitis, now admitted for Asthma/COPD exacerbation.      Acute on chronic hypoxic respiratory failure likely secondary to Asthma/COPD Exacerbation   Likely 2/2 to environmental trigger vs infection. Infection less likely since pt with no URI symptoms, no fever or leukoysis. Patient tachypnic in the ED, placed on bipap, received duonebx4, mag, IV solumedrol 125mg in the ED. ABG: pH 7.377, pCO2 45.5, pO2 259, HCO3 26.6 (on bipap). CXR: no acute findings, hyperinflation. Patient is on 2L O2 at home. Follows Dr. Trinity Alves in outpatient. This is her fifth admission in 2020 for COPD/Asthma exacerbation, most recent 4/8/20-4/11/20. Per Dr. Manuela Lau, this admission, pt has strong asthma component given significant bronchospasm to a wide variety of classic environmental triggers (I.e. fumes, fragrances, chemicals, smoke, mold, etc).    Improving this morning.  - Pulmonology following, appreciate recs  - Continue Bipap at night and as needed   - oxygen as needed, goal 88-92%  - Duonebs q4hrs scheduled  - Pulmicort nebs 1mg BID  - Albuterol PRN    - Incentive spirometry  - Continue home loratadine, flonase and singular  - Steroid taper per pulm- transitioned from 40mg Solumedrol IV q8hrs to 60mg prednisone PO daily. Will have extended taper for 6-8 weeks on discharge  - Continue Azithromycin 250mg x 5 days  - Aspiration precautions  - PT/OT  - FU outpatient with pulmonology(Dr. Sonal Monteiro) for allergy workup(Flovent HFA 200mcg 2 puffs BID (or ICS formulary alternative) when pt discharged)     Lactic acidosis(Resolved)- 5.6 on admission. . No s/sx's of sepsis or infection, but will work up. Has had persistently elevated lactic acid in past admissions. BCx: NG2D, UA WNL, Lactic Acid 5/26/20: 2.2  - Discontinue IVF NS@ 150ml/hr     - Discontinue LA check q4hrs     SHERRIE: Intolerant to Cpap. On trilogy machine nightly at home.   - BIPAP qhs in place home trilogy     Insulin dependent Type 2 Diabetes  Home lantus 35u pm and novolog SSI. BS have been in the 200's- 300's. On high dose steroids, which will also contribute to her hyperglycemia  -Increase Lantus 40U  -SSI(Very resistant scale)  -Accuchecks ACHS  -diabetic diet     Hypertension, HFpEF  ECHO (5/24/20) EF 65 - 70%. No regional wall motion abnormality. Moderate (grade 2) left ventricular diastolic dysfunction. Mildly dilated left atrium. Pulmonary hypertension. Pulmonary arterial systolic pressure is 61 mmHg. Severely elevated central venous pressure (15+ mmHg); IVC diameter is larger than 21 mm and collapses less than 50% with respiration. SBP in ED elevated  to 140s-170s.  Pt on lisinopril 20mg BID and HCTZ 12.5mg daily at home. Pt also on Lasix 20mg daily PRN for lower extremity edema at home. Patient endorsing increased swelling in bilateral upper arms and abdominal.   -Continue home Lasix 20mg daily  -Continue Lisinopril 20mg BID  -Continue HCTZ 12.5 mg daily     GERD  Pt takes omeprazole 40mg daily and pepcid for breakthrough symptoms at home.   -Continue protonix 40mg daily     Morbid obesity- BMI 46  - nutritional counselling     Diet Diabetic   DVT Prophylaxis SQH   GI Prophylaxis Protonix   Code status Full   Disposition Stepdown>2MN      Point of Contact Prasad Stephenson  Relationship: Daughter  [phone]     Signed By: Veto Giron MD     May 26, 2020

## 2020-05-26 NOTE — PROGRESS NOTES
Discharge noted for today. Patient is refusing home health, attending aware. Lakesha wallis personal care aids at home already set up. Patient has walker already. Patient has oxygen at home 2-3L through [employer], attending requesting new walk test to determine if home needs have increased. Patient only required 2L  Daughter to transport home.       JAMES Lepe  Case Management  [phone]

## 2020-05-26 NOTE — PROGRESS NOTES
Pt is alert and oriented x4. NSR on monitor. Pt on room air, 2L PRN and wears CPAP at night. Pt complains of CHAUDHARY but states that it is improving. Pt voids to bathroom with minimal assistance. Continuing to trend lactic acid levels, last 3.4. Transitioned to PO steroids. BGL managed with sliding scale coverage per MAR. Bed locked and in the lowest position. Call light within reach. Instructed to call for assistance. No events during the night.

## 2020-05-26 NOTE — PROGRESS NOTES
Problem: Falls - Risk of  Goal: *Absence of Falls  Description: Document Bard Mchugh Fall Risk and appropriate interventions in the flowsheet. Outcome: Progressing Towards Goal  Note: Fall Risk Interventions:            Medication Interventions: Bed/chair exit alarm, Patient to call before getting OOB                   Problem: Patient Education: Go to Patient Education Activity  Goal: Patient/Family Education  Outcome: Progressing Towards Goal     Problem: Diabetes Self-Management  Goal: *Disease process and treatment process  Description: Define diabetes and identify own type of diabetes; list 3 options for treating diabetes. Outcome: Progressing Towards Goal  Goal: *Incorporating nutritional management into lifestyle  Description: Describe effect of type, amount and timing of food on blood glucose; list 3 methods for planning meals. Outcome: Progressing Towards Goal  Goal: *Incorporating physical activity into lifestyle  Description: State effect of exercise on blood glucose levels. Outcome: Progressing Towards Goal  Goal: *Developing strategies to promote health/change behavior  Description: Define the ABC's of diabetes; identify appropriate screenings, schedule and personal plan for screenings. Outcome: Progressing Towards Goal  Goal: *Using medications safely  Description: State effect of diabetes medications on diabetes; name diabetes medication taking, action and side effects. Outcome: Progressing Towards Goal  Goal: *Monitoring blood glucose, interpreting and using results  Description: Identify recommended blood glucose targets  and personal targets. Outcome: Progressing Towards Goal  Goal: *Prevention, detection, treatment of acute complications  Description: List symptoms of hyper- and hypoglycemia; describe how to treat low blood sugar and actions for lowering  high blood glucose level.   Outcome: Progressing Towards Goal  Goal: *Prevention, detection and treatment of chronic complications  Description: Define the natural course of diabetes and describe the relationship of blood glucose levels to long term complications of diabetes.   Outcome: Progressing Towards Goal  Goal: *Developing strategies to address psychosocial issues  Description: Describe feelings about living with diabetes; identify support needed and support network  Outcome: Progressing Towards Goal  Goal: *Insulin pump training  Outcome: Progressing Towards Goal  Goal: *Sick day guidelines  Outcome: Progressing Towards Goal  Goal: *Patient Specific Goal (EDIT GOAL, INSERT TEXT)  Outcome: Progressing Towards Goal     Problem: Patient Education: Go to Patient Education Activity  Goal: Patient/Family Education  Outcome: Progressing Towards Goal     Problem: Patient Education: Go to Patient Education Activity  Goal: Patient/Family Education  Outcome: Progressing Towards Goal     Problem: Patient Education: Go to Patient Education Activity  Goal: Patient/Family Education  Outcome: Progressing Towards Goal     Problem: Breathing Pattern - Ineffective  Goal: *Absence of hypoxia  Outcome: Progressing Towards Goal  Goal: *Use of effective breathing techniques  Outcome: Progressing Towards Goal     Problem: Patient Education: Go to Patient Education Activity  Goal: Patient/Family Education  Outcome: Progressing Towards Goal

## 2020-05-26 NOTE — DISCHARGE INSTRUCTIONS
Patient Education        Learning About COPD, Asthma, and Air Pollution  How does air pollution affect COPD and asthma? When you have COPD or asthma, air pollution may make your symptoms worse. If it does, it means that air pollution is a trigger for you. It is important to know what your triggers are and how to deal with them. If air pollution is a trigger for you, you need to learn about air quality and pay attention to weather forecasts that include how bad the air is expected to be. How can you manage a flare-up caused by air pollution? · Do not panic. Quick treatment at home may help you prevent serious breathing problems. · Take your medicines exactly as your doctor tells you.  ? Use your quick-relief inhaler as directed by your doctor. If your symptoms do not get better after you use your medicine, have someone take you to the emergency room. Call an ambulance if necessary. ? With inhaled medicines, a spacer or a nebulizer may help you get more medicine to your lungs. Ask your doctor or pharmacist how to use them properly. Practice using the spacer in front of a mirror before you have a flare-up. This may help you get the medicine into your lungs quickly. ? If your doctor has given you steroid pills, take them as directed. ? Talk to your doctor if you have any problems with your medicine. What can you do to prevent flare-ups? · Try not to be outside when air pollution levels are high. Stay at home with your windows closed. · Do not smoke. This is the most important step you can take to prevent more damage to your lungs and prevent problems. If you already smoke, it is never too late to stop. If you need help quitting, talk to your doctor about stop-smoking programs and medicines. These can increase your chances of quitting for good. · Avoid secondhand smoke; cold, dry air; and high altitudes. · Take your daily medicines as prescribed. · Avoid colds and flu.   ? Get a pneumococcal vaccine. ? Get a flu vaccine each year, as soon as it is available. Ask those you live or work with to do the same, so they will not get the flu and infect you. ? Try to stay away from people with colds or the flu. ? Wash your hands often. Follow-up care is a key part of your treatment and safety. Be sure to make and go to all appointments, and call your doctor if you are having problems. It's also a good idea to know your test results and keep a list of the medicines you take. Where can you learn more? Go to http://etelvina-olivier.info/  Enter B312 in the search box to learn more about \"Learning About COPD, Asthma, and Air Pollution. \"  Current as of: June 9, 2019Content Version: 12.4  © 9984-5662 [employer]. Care instructions adapted under license by [employer] (which disclaims liability or warranty for this information). If you have questions about a medical condition or this instruction, always ask your healthcare professional. [place] [address] any warranty or liability for your use of this information. Patient Education        Using a Metered-Dose Inhaler: Care Instructions  Your Care Instructions    A metered-dose inhaler lets you breathe medicine into your lungs quickly. Inhaled medicine works faster than the same medicine in a pill. An inhaler allows you to take less medicine than you would need if you took it as a pill. \"Metered-dose\" means that the inhaler gives a measured amount of medicine each time you use it. A metered-dose inhaler gives medicine in the form of a liquid mist.  Your doctor may want you to use a spacer with your inhaler. A spacer is a chamber that you attach to the inhaler. The chamber holds the medicine before you inhale it. That way, you can inhale the medicine in as many breaths as you need.  Doctors recommend using a spacer with most metered-dose inhalers, especially those with corticosteroid medicines. Follow-up care is a key part of your treatment and safety. Be sure to make and go to all appointments, and call your doctor if you are having problems. It's also a good idea to know your test results and keep a list of the medicines you take. How can you care for yourself at home? To get started using your inhaler  · Talk with your health care provider to be sure you are using your inhaler the right way. It might help if you practice using it in front of a mirror. Use the inhaler exactly as prescribed. · Check that you have the correct medicine. If you use more than one inhaler, put a label on each one. This will let you know which one to use at the right time. · Keep track of how much medicine is in the inhaler. Check the label to see how many doses are in the container. If you know how many puffs you can take, you can replace the inhaler before you run out. Ask your health care provider how you can keep track of how much medicine is left. · Use a spacer if you have problems pressing the inhaler and breathing in at the same time. You also may need a spacer if you are using corticosteroid medicines. · If you are using a corticosteroid inhaler, gargle and rinse out your mouth with water after use. Do not swallow the water. Swallowing the water will increase the chance that the medicine will get into your bloodstream. This may make it more likely that you will have side effects. To use a spacer with an inhaler  1. Shake the inhaler. Remove the inhaler cap, and place the mouthpiece of the inhaler into the spacer. Check the inhaler instructions to see if you need to prime your inhaler before you use it. If it needs priming, follow the instructions on how to prime your inhaler. 2. Remove the cap from the spacer. 3. Hold the inhaler upright with the mouthpiece at the bottom. 4. Tilt your head back a little, and breathe out slowly and completely.   5. Place the spacer's mouthpiece in your mouth.  6. Press down on the inhaler to spray one puff of medicine into the spacer, and then start breathing in slowly. Wait to inhale until after you have pressed down on the inhaler. Some spacers have a whistle. If you hear it, you should breathe in more slowly. 7. Hold your breath for 10 seconds. This will let the medicine settle in your lungs. 8. If you need to take a second dose, wait 30 to 60 seconds to allow the inhaler valve to refill. To use an inhaler without a spacer  1. Shake the inhaler as directed. Remove the cap. Check the instructions to see if you need to prime your inhaler before you use it. If it needs priming, follow the instructions on how to prime your inhaler. 2. Hold the inhaler upright with the mouthpiece at the bottom. 3. Tilt your head back a little, and breathe out slowly and completely. 4. Position the inhaler in one of two ways:  ? You can place the inhaler in your mouth. This is easier for most people. And it lowers the risk that any of the medicine will get into your eyes. ? Or you can place the inhaler 1 to 2 inches in front of your open mouth, without closing your lips over it. Try to open your mouth as wide as you can. Placing the inhaler in front of your open mouth may be better for getting the medicine into your lungs. But some people may find this too hard to do. 5. Start taking slow, even breaths through your mouth. Press down on the inhaler once, then inhale fully. 6. Hold your breath for 10 seconds. This will let the medicine settle in your lungs. 7. If you need to take a second dose, wait 30 to 60 seconds to allow the inhaler valve to refill. Where can you learn more? Go to http://etelvina-olivier.info/  Enter K111 in the search box to learn more about \"Using a Metered-Dose Inhaler: Care Instructions. \"  Current as of: June 9, 2019Content Version: 12.4  © 5392-7778 [employer].   Care instructions adapted under license by [employer] (which disclaims liability or warranty for this information). If you have questions about a medical condition or this instruction, always ask your healthcare professional. [address] any warranty or liability for your use of this information.

## 2020-05-26 NOTE — PROGRESS NOTES
Problem: Falls - Risk of  Goal: *Absence of Falls  Description: Document Delicia Pina Fall Risk and appropriate interventions in the flowsheet.   Outcome: Progressing Towards Goal  Note: Fall Risk Interventions:            Medication Interventions: Assess postural VS orthostatic hypotension, Patient to call before getting OOB, Teach patient to arise slowly                   Problem: Patient Education: Go to Patient Education Activity  Goal: Patient/Family Education  Outcome: Progressing Towards Goal

## 2020-05-26 NOTE — ROUTINE PROCESS
Bedside and Verbal shift change report given to [address] [place] [address] St (oncoming nurse) by Lorie Acuña (offgoing nurse). Report included the following information SBAR, Kardex and Cardiac Rhythm SR.       1245:Pulse oximetry assessment   97% at rest on room air (if 88% or less, skip next steps)  96% while ambulating on room air  99% at rest on 2LPM  94% while ambulating on 2LPM      1327:Informed patient of discharge orders, daughter is ride by but at work, someone can get her around 1430. I have reviewed discharge instructions with the patient. The patient verbalized understanding. Discharge medications reviewed with patient and appropriate educational materials and side effects teaching were provided. If you experience chest pain call 911. Do not drive yourself.

## 2020-05-27 ENCOUNTER — PATIENT OUTREACH (OUTPATIENT)
Dept: CASE MANAGEMENT | Age: 60
End: 2020-05-27

## 2020-05-27 NOTE — PROGRESS NOTES
Patient contacted regarding recent discharge and COVID-19 risk. Discussed COVID-19 related testing which was not done at this time. Test results were not done. Patient informed of results, if available? no    Care Transition Nurse/ Ambulatory Care Manager contacted the patient by telephone to perform post discharge assessment. Verified name and  with patient as identifiers. Patient has following risk factors of: COPD, diabetes and HTN. CTN/ACM reviewed discharge instructions, medical action plan and red flags related to discharge diagnosis. Reviewed and educated them on any new and changed medications related to discharge diagnosis. Advised obtaining a 90-day supply of all daily and as-needed medications. Education provided regarding infection prevention, and signs and symptoms of COVID-19 and when to seek medical attention with patient who verbalized understanding. Discussed exposure protocols and quarantine from [address] you at higher risk for severe illness  and given an opportunity for questions and concerns. The patient agrees to contact the COVID-19 hotline [phone] or PCP office for questions related to their healthcare. CTN/ACM provided contact information for future reference. From CDC: Are you at higher risk for severe illness?  Wash your hands often.  Avoid close contact (6 feet, which is about two arm lengths) with people who are sick.  Put distance between yourself and other people if COVID-19 is spreading in your community.  Clean and disinfect frequently touched surfaces.  Avoid all cruise travel and non-essential air travel.  Call your healthcare professional if you have concerns about COVID-19 and your underlying condition or if you are sick.     For more information on steps you can take to protect yourself, see CDC's How to Protect Yourself      Patient/family/caregiver given information for Kathy Lamb and agrees to enroll no  Patient's preferred e-mail:  none  Patient's preferred phone number: [phone]  Based on Loop alert triggers, patient will be contacted by nurse care manager for worsening symptoms. Plan for follow-up call in 5-7 days based on severity of symptoms and risk factors. Patient was hospitalized for 5 days with an exacerbation of COPD which caused some respiratory failure. Patient is home and is on steroids which cause her sugar to go up. She stated that she is doing ok. It just takes her a while to get adjusted when she goes home from the hospital. Patient appreciated the call to follow up with her. Will follow.

## 2020-06-02 ENCOUNTER — PATIENT OUTREACH (OUTPATIENT)
Dept: CASE MANAGEMENT | Age: 60
End: 2020-06-02

## 2020-06-03 ENCOUNTER — APPOINTMENT (OUTPATIENT)
Dept: GENERAL RADIOLOGY | Age: 60
End: 2020-06-03
Attending: EMERGENCY MEDICINE
Payer: MEDICARE

## 2020-06-03 ENCOUNTER — HOSPITAL ENCOUNTER (EMERGENCY)
Age: 60
Discharge: HOME | End: 2020-06-04
Attending: EMERGENCY MEDICINE
Payer: MEDICARE

## 2020-06-03 ENCOUNTER — VIRTUAL VISIT (OUTPATIENT)
Dept: SURGERY | Age: 60
End: 2020-06-03

## 2020-06-03 DIAGNOSIS — J44.1 ACUTE EXACERBATION OF CHRONIC OBSTRUCTIVE PULMONARY DISEASE (COPD) (HCC): Primary | ICD-10-CM

## 2020-06-03 DIAGNOSIS — K42.9 UMBILICAL HERNIA WITHOUT OBSTRUCTION AND WITHOUT GANGRENE: Primary | ICD-10-CM

## 2020-06-03 LAB
ALBUMIN SERPL-MCNC: 3.8 G/DL (ref 3.4–5)
ALBUMIN/GLOB SERPL: 1 {RATIO} (ref 0.8–1.7)
ALP SERPL-CCNC: 67 U/L (ref 45–117)
ALT SERPL-CCNC: 49 U/L (ref 13–56)
ANION GAP SERPL CALC-SCNC: 8 MMOL/L (ref 3–18)
AST SERPL-CCNC: 17 U/L (ref 10–38)
BASOPHILS # BLD: 0 K/UL (ref 0–0.1)
BASOPHILS NFR BLD: 0 % (ref 0–2)
BILIRUB SERPL-MCNC: 0.6 MG/DL (ref 0.2–1)
BNP SERPL-MCNC: 95 PG/ML (ref 0–900)
BUN SERPL-MCNC: 18 MG/DL (ref 7–18)
BUN/CREAT SERPL: 17 (ref 12–20)
CALCIUM SERPL-MCNC: 9.5 MG/DL (ref 8.5–10.1)
CHLORIDE SERPL-SCNC: 103 MMOL/L (ref 100–111)
CO2 SERPL-SCNC: 31 MMOL/L (ref 21–32)
CREAT SERPL-MCNC: 1.03 MG/DL (ref 0.6–1.3)
DIFFERENTIAL METHOD BLD: ABNORMAL
EOSINOPHIL # BLD: 0.1 K/UL (ref 0–0.4)
EOSINOPHIL NFR BLD: 1 % (ref 0–5)
ERYTHROCYTE [DISTWIDTH] IN BLOOD BY AUTOMATED COUNT: 14.4 % (ref 11.6–14.5)
GLOBULIN SER CALC-MCNC: 3.7 G/DL (ref 2–4)
GLUCOSE SERPL-MCNC: 165 MG/DL (ref 74–99)
HCT VFR BLD AUTO: 38 % (ref 35–45)
HGB BLD-MCNC: 13.1 G/DL (ref 12–16)
LYMPHOCYTES # BLD: 3.5 K/UL (ref 0.9–3.6)
LYMPHOCYTES NFR BLD: 33 % (ref 21–52)
MAGNESIUM SERPL-MCNC: 2 MG/DL (ref 1.6–2.6)
MCH RBC QN AUTO: 31.2 PG (ref 24–34)
MCHC RBC AUTO-ENTMCNC: 34.5 G/DL (ref 31–37)
MCV RBC AUTO: 90.5 FL (ref 74–97)
MONOCYTES # BLD: 0.8 K/UL (ref 0.05–1.2)
MONOCYTES NFR BLD: 7 % (ref 3–10)
NEUTS SEG # BLD: 6.2 K/UL (ref 1.8–8)
NEUTS SEG NFR BLD: 59 % (ref 40–73)
PLATELET # BLD AUTO: 292 K/UL (ref 135–420)
PMV BLD AUTO: 8.8 FL (ref 9.2–11.8)
POTASSIUM SERPL-SCNC: 3.7 MMOL/L (ref 3.5–5.5)
PROT SERPL-MCNC: 7.5 G/DL (ref 6.4–8.2)
RBC # BLD AUTO: 4.2 M/UL (ref 4.2–5.3)
SODIUM SERPL-SCNC: 142 MMOL/L (ref 136–145)
TROPONIN I SERPL-MCNC: <0.02 NG/ML (ref 0–0.04)
WBC # BLD AUTO: 10.6 K/UL (ref 4.6–13.2)

## 2020-06-03 PROCEDURE — 85025 COMPLETE CBC W/AUTO DIFF WBC: CPT

## 2020-06-03 PROCEDURE — 84484 ASSAY OF TROPONIN QUANT: CPT

## 2020-06-03 PROCEDURE — 93005 ELECTROCARDIOGRAM TRACING: CPT

## 2020-06-03 PROCEDURE — 99285 EMERGENCY DEPT VISIT HI MDM: CPT

## 2020-06-03 PROCEDURE — 71045 X-RAY EXAM CHEST 1 VIEW: CPT

## 2020-06-03 PROCEDURE — 83880 ASSAY OF NATRIURETIC PEPTIDE: CPT

## 2020-06-03 PROCEDURE — 83735 ASSAY OF MAGNESIUM: CPT

## 2020-06-03 PROCEDURE — 80053 COMPREHEN METABOLIC PANEL: CPT

## 2020-06-03 RX ORDER — IPRATROPIUM BROMIDE AND ALBUTEROL SULFATE 2.5; .5 MG/3ML; MG/3ML
SOLUTION RESPIRATORY (INHALATION)
Status: DISCONTINUED
Start: 2020-06-03 | End: 2020-06-04 | Stop reason: HOSPADM

## 2020-06-03 NOTE — PROGRESS NOTES
[address] [place] [address] [facility]  General Surgery    Name: Janene Maxwell MRN: [id number]   : 1960 Location: [address] [place] [address] [facility]   Date: 6/3/2020         Virtal Visit  Informed consent was obtained from the patient for a telehealth visit on via [facility]. The patient understands that the charges may be billed for this visit if care is given outside of the global period. Visit is performed with the patient at home. I am in the office. Start time: 1000 hrs  End time:1015 hrs  Subjective:  Patient has been admitted to the hospital a couple of occasions since her visit with me 2 months ago. Both hospital admissions were for COPD exacerbation. She is scheduled to see Dr. Mina bass for management of the COPD. She states that because of the steroids 50 mg of prednisone which she has been on for 2 months her blood sugars always high. She can control the hunger but she feels drained. No new issues with the hernia. She has not lost weight according to the patient. Objective:    Physical Exam:    General: Awake and alert, oriented x4, no apparent distress   Neuro: Cranial nerves II through XII intact    Current Medications:  Current Outpatient Medications   Medication Sig Dispense Refill    insulin glargine (LANTUS,BASAGLAR) 100 unit/mL (3 mL) inpn 40 Units by SubCUTAneous route nightly. Please inject 40 units every bedtime. Indications: type 2 diabetes mellitus 28 Units 0    fluticasone propionate (Flovent HFA) 220 mcg/actuation inhaler Take 1 Puff by inhalation two (2) times a day. 1 Inhaler 0    pantoprazole (PROTONIX) 40 mg tablet Take 1 Tab by mouth daily. 30 Tab 0    predniSONE (DELTASONE) 20 mg tablet Please take 60mg Prednisone(3 tabs) for 7 days, 50mg (2 1/2 tabs) for 7 days, 40mg(2 tabs) for 7 days, 30mg (1 1/2 tabs) for 7 days, 20mg (1 tabs) for 7 days, 10mg (1/2 tab) for 7 days, then take 5 mg daily from then on wards.  80 Tab 0    furosemide (Lasix) 20 mg tablet Take 20 mg by mouth every other day.  azithromycin (ZITHROMAX) 250 mg tablet Take 1 Tab by mouth daily. Monday-Friday. 30 Tab 0    lactobacillus sp. 50 billion cpu (BIO-K PLUS) 50 billion cell -375 mg cap capsule Take 1 Cap by mouth daily. 30 Cap 0    simethicone (GAS-X) 125 mg capsule Take 125 mg by mouth four (4) times daily as needed for Flatulence.  loratadine (CLARITIN) 10 mg tablet Take 10 mg by mouth daily as needed for Allergies.  lisinopril (PRINIVIL, ZESTRIL) 20 mg tablet Take 20 mg by mouth two (2) times a day.  albuterol sulfate (PROVENTIL;VENTOLIN) 2.5 mg/0.5 mL nebu nebulizer solution 0.5 mL by Nebulization route four (4) times daily as needed for Wheezing. To be used with HyperSal nebulizer solution. ICD code: COPD J44.1 60 mL 3    fluticasone propionate (FLONASE ALLERGY RELIEF) 50 mcg/actuation nasal spray 2 Sprays by Both Nostrils route daily.  fluticasone propion-salmeterol (ADVAIR HFA) 230-21 mcg/actuation inhaler Take 2 Puffs by inhalation two (2) times a day.  hydroCHLOROthiazide (HYDRODIURIL) 12.5 mg tablet Take 12.5 mg by mouth daily.  tiotropium bromide (SPIRIVA RESPIMAT) 2.5 mcg/actuation inhaler Take 2 Puffs by inhalation daily.  albuterol sulfate 90 mcg/actuation aepb Take 1 Puff by inhalation every four (4) hours. (Patient taking differently: Take 1 Puff by inhalation every four (4) hours as needed.) 1 Inhaler 0    NOVOLOG FLEXPEN U-100 INSULIN 100 unit/mL inpn Continue home Sliding scale insulin as prior to admission (Patient taking differently: 1 Units by SubCUTAneous route three (3) times daily. If BG <100=0u  101-150=5u  151-250=8u  251-300=12u  >300 call MD  ) 1 Pen 0    OXYGEN-AIR DELIVERY SYSTEMS 2 L by Nasal route continuous. First Choice      aspirin delayed-release 81 mg tablet Take 81 mg by mouth daily.  montelukast (SINGULAIR) 10 mg tablet Take 10 mg by mouth nightly. Chart and notes reviewed. Data reviewed.  I have evaluated and examined the patient. IMPRESSION:   · Patient with umbilical hernia which is chronically incarcerated but no evidence of strangulation according to the patient's history no evidence of obstruction. PLAN:/DISCUSION:   · I again encouraged the patient to keep a food journal and work on losing weight through eating less calories and she has burning.   · She understands that if the hernia were to become if she had any evidence of obstruction then she should go to the emergency department  · Follow-up PRN        David Hargrove MD

## 2020-06-03 NOTE — PROGRESS NOTES
Jose De Jesus Carrillo is a 60 y.o. female  Chief Complaint   Patient presents with    Follow-up     umbilical hernia        There were no vitals filed for this visit. patient unable to provide at this time. Depression Screening:  3 most recent PHQ Screens 2/3/2020   Little interest or pleasure in doing things Not at all   Feeling down, depressed, irritable, or hopeless Not at all   Total Score PHQ 2 0       Learning Assessment:  Learning Assessment 4/9/2018   PRIMARY LEARNER Patient   HIGHEST LEVEL OF EDUCATION - PRIMARY LEARNER  SOME COLLEGE   BARRIERS PRIMARY LEARNER -   PRIMARY LANGUAGE ENGLISH   LEARNER PREFERENCE PRIMARY READING   ANSWERED BY patient Reola Holstein RELATIONSHIP SELF         Fall Risk  Fall Risk Assessment, last 12 mths 2/6/2018   Able to walk? Yes   Fall in past 12 months? No       Health Maintenance reviewed and discussed and ordered per Provider. Patient verbally consented to a virtual visit. Dr. Consuelo Steele is in his office patient is home.

## 2020-06-03 NOTE — PATIENT INSTRUCTIONS
Abdominal Hernia Repair: Before Your Surgery What is abdominal hernia repair surgery? An abdominal hernia repair is a type of surgery. It fixes a problem called a hernia. A hernia is a bulge under the skin in your belly. It happens when you have a weak spot in your belly muscles and a piece of your intestines or tissues pokes through your muscles. This can cause pain. You may notice the pain most when you lift something heavy. You can have a hernia near your belly button. Or it may be in a scar from an earlier surgery. To fix it, the doctor will do one of two kinds of surgery. In open surgery, the doctor makes one cut near the hernia. This cut is called an incision. In laparoscopic surgery, the doctor makes several very small incisions and uses a thin, lighted scope and small tools. In either type of surgery, the doctor pushes the bulge back in place, if needed. Then the doctor sews the healthy tissue back together. Often the doctor patches the weak spot with a piece of material. 
Laparoscopic surgery leaves several small scars. Open surgery leaves one long scar. The scars fade with time. You will probably need to take 1 to 2 weeks off from work. But if your job requires heavy lifting or other physical work, you may need to take 4 to 6 weeks off. Follow-up care is a key part of your treatment and safety. Be sure to make and go to all appointments, and call your doctor if you are having problems. It's also a good idea to know your test results and keep a list of the medicines you take. How do you prepare for the surgery? Surgery can be stressful. This information will help you understand what you can expect. And it will help you safely prepare for surgery. Preparing for surgery · Be sure you have someone to take you home. Anesthesia and pain medicine will make it unsafe for you to drive or get home on your own.  
· Understand exactly what surgery is planned, along with the risks, benefits, and other options. · Tell your doctor ALL the medicines, vitamins, supplements, and herbal remedies you take. Some may increase the risk of problems during your surgery. Your doctor will tell you if you should stop taking any of them before the surgery and how soon to do it. · If you take aspirin or some other blood thinner, ask your doctor if you should stop taking it before your surgery. Make sure that you understand exactly what your doctor wants you to do. These medicines increase the risk of bleeding. · Make sure your doctor and the hospital have a copy of your advance directive. If you don't have one, you may want to prepare one. It lets others know your health care wishes. It's a good thing to have before any type of surgery or procedure. Altagracia Velasco What happens on the day of surgery? · Follow the instructions exactly about when to stop eating and drinking. If you don't, your surgery may be canceled. If your doctor told you to take your medicines on the day of surgery, take them with only a sip of water. · Take a bath or shower before you come in for your surgery. Do not apply lotions, perfumes, deodorants, or nail polish. · Do not shave the surgical site yourself. · Take off all jewelry and piercings. And take out contact lenses, if you wear them. At the hospital or surgery center · Bring a picture ID. · The area for surgery is often marked to make sure there are no errors. · You will be kept comfortable and safe by your anesthesia provider. You will be asleep during the surgery. · The surgery will take 30 minutes to 2 hours. It depends on how large the hernia is and where it is. When should you call your doctor? · You have questions or concerns. · You don't understand how to prepare for your surgery. · You become ill before the surgery (such as fever, flu, or a cold). · You need to reschedule or have changed your mind about having the surgery. Where can you learn more? Go to http://etelvina-olivier.info/ Enter U288 in the search box to learn more about \"Abdominal Hernia Repair: Before Your Surgery. \" Current as of: August 12, 2019               Content Version: 12.5 © 5631-1515 [employer]. Care instructions adapted under license by [employer] (which disclaims liability or warranty for this information). If you have questions about a medical condition or this instruction, always ask your healthcare professional. [address] any warranty or liability for your use of this information.

## 2020-06-04 ENCOUNTER — PATIENT OUTREACH (OUTPATIENT)
Dept: CASE MANAGEMENT | Age: 60
End: 2020-06-04

## 2020-06-04 VITALS
OXYGEN SATURATION: 95 % | HEART RATE: 89 BPM | RESPIRATION RATE: 18 BRPM | DIASTOLIC BLOOD PRESSURE: 53 MMHG | SYSTOLIC BLOOD PRESSURE: 119 MMHG

## 2020-06-04 LAB
ATRIAL RATE: 84 BPM
CALCULATED P AXIS, ECG09: 91 DEGREES
CALCULATED R AXIS, ECG10: 2 DEGREES
CALCULATED T AXIS, ECG11: 38 DEGREES
DIAGNOSIS, 93000: NORMAL
P-R INTERVAL, ECG05: 132 MS
Q-T INTERVAL, ECG07: 382 MS
QRS DURATION, ECG06: 84 MS
QTC CALCULATION (BEZET), ECG08: 451 MS
VENTRICULAR RATE, ECG03: 84 BPM

## 2020-06-04 NOTE — PROGRESS NOTES
Patient contacted regarding recent discharge and COVID-19 risk. Discussed COVID-19 related testing which was not done at this time. Test results were not done. Patient informed of results, if available? no    Care Transition Nurse/ Ambulatory Care Manager contacted the patient by telephone to perform post discharge assessment. Verified name and  with patient as identifiers. Patient has following risk factors of: heart failure, COPD, asthma and diabetes and HTN. CTN/ACM reviewed discharge instructions, medical action plan and red flags related to discharge diagnosis. Reviewed and educated them on any new and changed medications related to discharge diagnosis. Advised obtaining a 90-day supply of all daily and as-needed medications. Education provided regarding infection prevention, and signs and symptoms of COVID-19 and when to seek medical attention with patient who verbalized understanding. Discussed exposure protocols and quarantine from [address] you at higher risk for severe illness  and given an opportunity for questions and concerns. The patient agrees to contact the COVID-19 hotline [phone] or PCP office for questions related to their healthcare. CTN/ACM provided contact information for future reference. From CDC: Are you at higher risk for severe illness?  Wash your hands often.  Avoid close contact (6 feet, which is about two arm lengths) with people who are sick.  Put distance between yourself and other people if COVID-19 is spreading in your community.  Clean and disinfect frequently touched surfaces.  Avoid all cruise travel and non-essential air travel.  Call your healthcare professional if you have concerns about COVID-19 and your underlying condition or if you are sick.     For more information on steps you can take to protect yourself, see CDC's How to Protect Yourself      Patient/family/caregiver given information for Kathy Lamb and agrees to enroll no  Patient's preferred e-mail:  none  Patient's preferred phone number: [phone]  Based on Loop alert triggers, patient will be contacted by nurse care manager for worsening symptoms. Plan for follow-up call in 5-7 days based on severity of symptoms and risk factors. Patient stated that she went out yesterday- 1st time since admission to hospital. Patient feels that the heat may have triggered an asthma attack. Encouraged patient to stay in today since it is to be hot again- she stated that she definitely would be. Also encouraged patient to continue to wash her hands and to wipe things down at home such as faucets and door knobs. Will follow.

## 2020-06-04 NOTE — DISCHARGE INSTRUCTIONS
Patient Education        Using a Metered-Dose Inhaler: Care Instructions  Your Care Instructions     A metered-dose inhaler lets you breathe medicine into your lungs quickly. Inhaled medicine works faster than the same medicine in a pill. An inhaler allows you to take less medicine than you would need if you took it as a pill. \"Metered-dose\" means that the inhaler gives a measured amount of medicine each time you use it. A metered-dose inhaler gives medicine in the form of a liquid mist.  Your doctor may want you to use a spacer with your inhaler. A spacer is a chamber that you attach to the inhaler. The chamber holds the medicine before you inhale it. That way, you can inhale the medicine in as many breaths as you need. Doctors recommend using a spacer with most metered-dose inhalers, especially those with corticosteroid medicines. Follow-up care is a key part of your treatment and safety. Be sure to make and go to all appointments, and call your doctor if you are having problems. It's also a good idea to know your test results and keep a list of the medicines you take. How can you care for yourself at home? To get started using your inhaler  · Talk with your health care provider to be sure you are using your inhaler the right way. It might help if you practice using it in front of a mirror. Use the inhaler exactly as prescribed. · Check that you have the correct medicine. If you use more than one inhaler, put a label on each one. This will let you know which one to use at the right time. · Keep track of how much medicine is in the inhaler. Check the label to see how many doses are in the container. If you know how many puffs you can take, you can replace the inhaler before you run out. Ask your health care provider how you can keep track of how much medicine is left. · Use a spacer if you have problems pressing the inhaler and breathing in at the same time.  You also may need a spacer if you are using corticosteroid medicines. · If you are using a corticosteroid inhaler, gargle and rinse out your mouth with water after use. Do not swallow the water. Swallowing the water will increase the chance that the medicine will get into your bloodstream. This may make it more likely that you will have side effects. To use a spacer with an inhaler  1. Shake the inhaler. Remove the inhaler cap, and place the mouthpiece of the inhaler into the spacer. Check the inhaler instructions to see if you need to prime your inhaler before you use it. If it needs priming, follow the instructions on how to prime your inhaler. 2. Remove the cap from the spacer. 3. Hold the inhaler upright with the mouthpiece at the bottom. 4. Tilt your head back a little, and breathe out slowly and completely. 5. Place the spacer's mouthpiece in your mouth. 6. Press down on the inhaler to spray one puff of medicine into the spacer, and then start breathing in slowly. Wait to inhale until after you have pressed down on the inhaler. Some spacers have a whistle. If you hear it, you should breathe in more slowly. 7. Hold your breath for 10 seconds. This will let the medicine settle in your lungs. 8. If you need to take a second dose, wait 30 to 60 seconds to allow the inhaler valve to refill. To use an inhaler without a spacer  1. Shake the inhaler as directed. Remove the cap. Check the instructions to see if you need to prime your inhaler before you use it. If it needs priming, follow the instructions on how to prime your inhaler. 2. Hold the inhaler upright with the mouthpiece at the bottom. 3. Tilt your head back a little, and breathe out slowly and completely. 4. Position the inhaler in one of two ways:  ? You can place the inhaler in your mouth. This is easier for most people. And it lowers the risk that any of the medicine will get into your eyes.   ? Or you can place the inhaler 1 to 2 inches in front of your open mouth, without closing your lips over it. Try to open your mouth as wide as you can. Placing the inhaler in front of your open mouth may be better for getting the medicine into your lungs. But some people may find this too hard to do. 5. Start taking slow, even breaths through your mouth. Press down on the inhaler once, then inhale fully. 6. Hold your breath for 10 seconds. This will let the medicine settle in your lungs. 7. If you need to take a second dose, wait 30 to 60 seconds to allow the inhaler valve to refill. Where can you learn more? Go to http://etelvina-olivier.info/  Enter K111 in the search box to learn more about \"Using a Metered-Dose Inhaler: Care Instructions. \"  Current as of: February 24, 2020               Content Version: 12.5  © 2077-6399 [employer]. Care instructions adapted under license by [employer] (which disclaims liability or warranty for this information). If you have questions about a medical condition or this instruction, always ask your healthcare professional. [place] [address] any warranty or liability for your use of this information. Patient Education        COPD Exacerbation Plan: Care Instructions  Your Care Instructions     If you have chronic obstructive pulmonary disease (COPD), your usual shortness of breath could suddenly get worse. You may start coughing more and have more mucus. This flare-up is called a COPD exacerbation (say \"br-WHU-sb-BAY-shun\"). A lung infection or air pollution could set off an exacerbation. Sometimes it can happen after a quick change in temperature or being around chemicals. Work with your doctor to make a plan for dealing with an exacerbation. You can better manage it if you plan ahead. Follow-up care is a key part of your treatment and safety. Be sure to make and go to all appointments, and call your doctor if you are having problems.  It's also a good idea to know your test results and keep a list of the medicines you take. How can you care for yourself at home? During an exacerbation  · Do not panic if you start to have one. Quick treatment at home may help you prevent serious breathing problems. If you have a COPD exacerbation plan that you developed with your doctor, follow it. · Take your medicines exactly as your doctor tells you.  ? Use your inhaler as directed by your doctor. If your symptoms do not get better after you use your medicine, have someone take you to the emergency room. Call an ambulance if necessary. ? With inhaled medicines, a spacer or a nebulizer may help you get more medicine to your lungs. Ask your doctor or pharmacist how to use them properly. Practice using the spacer in front of a mirror before you have an exacerbation. This may help you get the medicine into your lungs quickly. ? If your doctor has given you steroid pills, take them as directed. ? Your doctor may have given you a prescription for antibiotics, which you can fill if you need it. ? Talk to your doctor if you have any problems with your medicine. And call your doctor if you have to use your antibiotic or steroid pills. Preventing an exacerbation  · Do not smoke. This is the most important step you can take to prevent more damage to your lungs and prevent problems. If you already smoke, it is never too late to stop. If you need help quitting, talk to your doctor about stop-smoking programs and medicines. These can increase your chances of quitting for good. · Take your daily medicines as prescribed. · Avoid colds and flu. ? Get a pneumococcal vaccine. ? Get a flu vaccine each year, as soon as it is available. Ask those you live or work with to do the same, so they will not get the flu and infect you. ? Try to stay away from people with colds or the flu. ? Wash your hands often. · Avoid secondhand smoke; air pollution; cold, dry air; hot, humid air; and high altitudes.  Stay at home with your windows closed when air pollution is bad. · Learn breathing techniques for COPD, such as breathing through pursed lips. These techniques can help you breathe easier during an exacerbation. When should you call for help? [id number] anytime you think you may need emergency care. For example, call if:  · You have severe trouble breathing. · You have severe chest pain. Call your doctor now or seek immediate medical care if:  · You have new or worse shortness of breath. · You develop new chest pain. · You are coughing more deeply or more often, especially if you notice more mucus or a change in the color of your mucus. · You cough up blood. · You have new or increased swelling in your legs or belly. · You have a fever. Watch closely for changes in your health, and be sure to contact your doctor if:  · You need to use your antibiotic or steroid pills. · Your symptoms are getting worse. Where can you learn more? Go to http://etelvina-olivier.info/  Enter U536 in the search box to learn more about \"COPD Exacerbation Plan: Care Instructions. \"  Current as of: February 24, 2020               Content Version: 12.5  © 7990-3432 Healthwise, Incorporated. Care instructions adapted under license by [employer] (which disclaims liability or warranty for this information). If you have questions about a medical condition or this instruction, always ask your healthcare professional. [place] [address] any warranty or liability for your use of this information.

## 2020-06-04 NOTE — ED NOTES
I have reviewed discharge instructions with the patient. The patient verbalized understanding. Pt d/cd to home awake, alert and in NAD. All questions answered.

## 2020-06-04 NOTE — ED NOTES
Presents via medic c/o shortness of breath. Hx of copd. Pt states her sob is unrelieved by home nebs and inhalers. On 2l nc home 02. Medics report sats in the low 90s which is pt baseline. VSS. Call bell placed within reach.   Awaiting provider yial

## 2020-06-04 NOTE — ED PROVIDER NOTES
EMERGENCY DEPARTMENT HISTORY AND PHYSICAL EXAM     10:23 PM        Date: 6/3/2020  Patient Name: Anyi Griggs     History of Presenting Illness      No chief complaint on file.     History Provided By: Patient  Location/Duration/Severity/Modifying factors   60year old female with hx of COPD, asthma, HTN, GERD, DM presents to the ED with chest tightness and shortness of breath. Patient just discharged from hospitalist service 8 days ago. She states she felt well immediately following discharge but began having progressively worsening shortness of breath with painful inspiration. Chest is tight and feels constrictive/band-like without radiation, no associated nausea/vomiting, lightheadedness/dizziness, diaphoresis. She states that her sx are consistent with her normal COPD exacerbations. She has attempted nebulizer treatments at home with no improvement which prompted her calling EMS. She is currently on a 6 week steroid taper from her last admission, currently taking 50 mg daily. Baseline O2 use is 2L only when active, has not been using O2 while sedentary.        PCP: Winston Marsh MD            Current Outpatient Medications   Medication Sig Dispense Refill    insulin glargine (LANTUS,BASAGLAR) 100 unit/mL (3 mL) inpn 40 Units by SubCUTAneous route nightly. Please inject 40 units every bedtime. Indications: type 2 diabetes mellitus 28 Units 0    fluticasone propionate (Flovent HFA) 220 mcg/actuation inhaler Take 1 Puff by inhalation two (2) times a day. 1 Inhaler 0    pantoprazole (PROTONIX) 40 mg tablet Take 1 Tab by mouth daily. 30 Tab 0    predniSONE (DELTASONE) 20 mg tablet Please take 60mg Prednisone(3 tabs) for 7 days, 50mg (2 1/2 tabs) for 7 days, 40mg(2 tabs) for 7 days, 30mg (1 1/2 tabs) for 7 days, 20mg (1 tabs) for 7 days, 10mg (1/2 tab) for 7 days, then take 5 mg daily from then on wards.  80 Tab 0    furosemide (Lasix) 20 mg tablet Take 20 mg by mouth every other day.        azithromycin (ZITHROMAX) 250 mg tablet Take 1 Tab by mouth daily. Monday-Friday. 30 Tab 0    lactobacillus sp. 50 billion cpu (BIO-K PLUS) 50 billion cell -375 mg cap capsule Take 1 Cap by mouth daily. 30 Cap 0    simethicone (GAS-X) 125 mg capsule Take 125 mg by mouth four (4) times daily as needed for Flatulence.        loratadine (CLARITIN) 10 mg tablet Take 10 mg by mouth daily as needed for Allergies.        lisinopril (PRINIVIL, ZESTRIL) 20 mg tablet Take 20 mg by mouth two (2) times a day.        albuterol sulfate (PROVENTIL;VENTOLIN) 2.5 mg/0.5 mL nebu nebulizer solution 0.5 mL by Nebulization route four (4) times daily as needed for Wheezing. To be used with HyperSal nebulizer solution. ICD code: COPD J44.1 60 mL 3    fluticasone propionate (FLONASE ALLERGY RELIEF) 50 mcg/actuation nasal spray 2 Sprays by Both Nostrils route daily.        fluticasone propion-salmeterol (ADVAIR HFA) 230-21 mcg/actuation inhaler Take 2 Puffs by inhalation two (2) times a day.        hydroCHLOROthiazide (HYDRODIURIL) 12.5 mg tablet Take 12.5 mg by mouth daily.        tiotropium bromide (SPIRIVA RESPIMAT) 2.5 mcg/actuation inhaler Take 2 Puffs by inhalation daily.        albuterol sulfate 90 mcg/actuation aepb Take 1 Puff by inhalation every four (4) hours. (Patient taking differently: Take 1 Puff by inhalation every four (4) hours as needed.) 1 Inhaler 0    NOVOLOG FLEXPEN U-100 INSULIN 100 unit/mL inpn Continue home Sliding scale insulin as prior to admission (Patient taking differently: 1 Units by SubCUTAneous route three (3) times daily. If BG <100=0u  101-150=5u  151-250=8u  251-300=12u  >300 call MD  ) 1 Pen 0    OXYGEN-AIR DELIVERY SYSTEMS 2 L by Nasal route continuous.  [facility] Choice        aspirin delayed-release 81 mg tablet Take 81 mg by mouth daily.        montelukast (SINGULAIR) 10 mg tablet Take 10 mg by mouth nightly.             Past History      Past Medical History:       Past Medical History:   Diagnosis Date    Asthma      Chronic lung disease      COPD      Cystocele, midline      Diabetes mellitus ([address]      GERD (gastroesophageal reflux disease)      Hidradenitis suppurativa      Hyperlipidemia      Hypertension      SHERRIE on CPAP       CPAP    Stress incontinence           Past Surgical History:        Past Surgical History:   Procedure Laterality Date    BREAST SURGERY PROCEDURE UNLISTED         Right breast benign tumor removal    HX APPENDECTOMY        HX CHOLECYSTECTOMY        HX DILATION AND CURETTAGE         Dysfunctional uterine bleeding, thought 2/2 fibroids    HX TUBAL LIGATION             Family History:        Family History   Problem Relation Age of Onset    Hypertension Mother      Stroke Mother      Breast Cancer Mother           Bilateral mastectomies    Cancer Mother           ovarian and breast    Heart Failure Mother      Heart Attack Father           2011    Heart Surgery Father           CABG    Heart Failure Father      COPD Sister           Heavy smoker    Cancer Sister           ovarian    Heart Failure Sister      Lung Disease Sister      Asthma Child      Cancer Maternal Aunt           pancreatic    Cancer Maternal Grandfather           stomach         Social History:  Social History            Tobacco Use    Smoking status: Former Smoker       Packs/day: 1.00       Years: 30.00       Pack years: 30.00       Types: Cigarettes       Start date: 1966       Last attempt to quit: 2006       Years since quittin.7    Smokeless tobacco: Former User    Tobacco comment: 1-1.5 packs per day   Substance Use Topics    Alcohol use: No    Drug use: No         Allergies:         Allergies   Allergen Reactions    Ancef [Cefazolin] Hives    Contrast Agent [Iodine] Anaphylaxis, Shortness of Breath and Swelling       Needs pre-medication for IV contrast with Benadryl, Solu-Medrol    Fish Containing Products Anaphylaxis       Pt states she had a severe allergic reaction at 9 y/o.  Statins-Hmg-Coa Reductase Inhibitors Myalgia    Metformin Other (comments)       Abdominal pain, diarrhea.  Codeine Other (comments)       Altered mental status            Review of Systems      Review of Systems   Constitutional: Negative for activity change, diaphoresis, fatigue and fever. Respiratory: Positive for cough (dry), chest tightness, shortness of breath and wheezing. Cardiovascular: Positive for chest pain (bandlike ). Gastrointestinal: Negative for nausea and vomiting. Allergic/Immunologic: Positive for environmental allergies. Neurological: Negative for dizziness, tremors and light-headedness. Psychiatric/Behavioral: The patient is nervous/anxious.             Physical Exam   There were no vitals taken for this visit.        Physical Exam  Constitutional:       General: She is in acute distress. Appearance: Normal appearance. She is obese. She is not ill-appearing. Cardiovascular:      Rate and Rhythm: Normal rate and regular rhythm. Heart sounds: Normal heart sounds. Pulmonary:      Effort: Respiratory distress present. Breath sounds: Wheezing present. Comments: Reduced air movement bilaterally. Abdominal:      General: Abdomen is flat. There is no distension. Palpations: Abdomen is soft. Tenderness: There is no abdominal tenderness. Neurological:      General: No focal deficit present.       Mental Status: She is alert and oriented to person, place, and time.             Diagnostic Study Results      Labs -  Recent Results   No results found for this or any previous visit (from the past 12 hour(s)).        Radiologic Studies -   XR CHEST PORT    (Results Pending)            Medical Decision Making   I am the first provider for this patient.     I reviewed the vital signs, available nursing notes, past medical history, past surgical history, family history and social history.     Vital Signs-Reviewed the patient's vital signs.        EKG: NSR      Records Reviewed: Old Medical Records (Time of Review: 10:23 PM)     ED Course: Progress Notes, Reevaluation, and Consults:   0752 - Patient re-evaluated after duo-neb. Feeling much better. Has pulmonology appt with Dr. Bonifacio Hollins on 6/25. She will follow up with him. Agreeable to going home.      Provider Notes (Medical Decision Making):   MDM   60 year old female with PMH of COPD, asthma, GERD well known to our service who presented with increased shortness of breath. Discharged 8 days ago from [facility] hospitalist service, still on steroid taper and Azithromycin. DDx to include atypical pneumonia, COPD exacerbation, COVID-19. COVID-19 testing negative during last hospital stay, patient has not been away from her home in the meantime. CXR unchanged from 5/21. Patient to continue prescribed steroid course and Azithromycin at home. Pulmonology appointment already in place for 6/25 with Dr. Bonifacio Hollins.      Procedures: none      Critical Care Time:         Diagnosis      Clinical Impression: No diagnosis found.     Disposition: discharge      Follow-up Information    None                 Patient's Medications   Start Taking     No medications on file   Continue Taking     ALBUTEROL SULFATE (PROVENTIL;VENTOLIN) 2.5 MG/0.5 ML NEBU NEBULIZER SOLUTION    0.5 mL by Nebulization route four (4) times daily as needed for Wheezing. To be used with HyperSal nebulizer solution. ICD code: COPD J44.1     ALBUTEROL SULFATE 90 MCG/ACTUATION AEPB    Take 1 Puff by inhalation every four (4) hours.     ASPIRIN DELAYED-RELEASE 81 MG TABLET    Take 81 mg by mouth daily.     AZITHROMYCIN (ZITHROMAX) 250 MG TABLET    Take 1 Tab by mouth daily.  Monday-Friday.     FLUTICASONE PROPION-SALMETEROL (ADVAIR HFA) 230-21 MCG/ACTUATION INHALER    Take 2 Puffs by inhalation two (2) times a day.     FLUTICASONE PROPIONATE (FLONASE ALLERGY RELIEF) 50 MCG/ACTUATION NASAL SPRAY    2 Sprays by Both Nostrils route daily.     FLUTICASONE PROPIONATE (FLOVENT HFA) 220 MCG/ACTUATION INHALER    Take 1 Puff by inhalation two (2) times a day.     FUROSEMIDE (LASIX) 20 MG TABLET    Take 20 mg by mouth every other day.     HYDROCHLOROTHIAZIDE (HYDRODIURIL) 12.5 MG TABLET    Take 12.5 mg by mouth daily.     INSULIN GLARGINE (LANTUS,BASAGLAR) 100 UNIT/ML (3 ML) INPN    40 Units by SubCUTAneous route nightly. Please inject 40 units every bedtime. Indications: type 2 diabetes mellitus     LACTOBACILLUS SP. 50 BILLION CPU (BIO-K PLUS) 50 BILLION CELL -375 MG CAP CAPSULE    Take 1 Cap by mouth daily.     LISINOPRIL (PRINIVIL, ZESTRIL) 20 MG TABLET    Take 20 mg by mouth two (2) times a day.     LORATADINE (CLARITIN) 10 MG TABLET    Take 10 mg by mouth daily as needed for Allergies.     MONTELUKAST (SINGULAIR) 10 MG TABLET    Take 10 mg by mouth nightly.     NOVOLOG FLEXPEN U-100 INSULIN 100 UNIT/ML INPN    Continue home Sliding scale insulin as prior to admission     OXYGEN-AIR DELIVERY SYSTEMS    2 L by Nasal route continuous. First Choice     PANTOPRAZOLE (PROTONIX) 40 MG TABLET    Take 1 Tab by mouth daily.     PREDNISONE (DELTASONE) 20 MG TABLET    Please take 60mg Prednisone(3 tabs) for 7 days, 50mg (2 1/2 tabs) for 7 days, 40mg(2 tabs) for 7 days, 30mg (1 1/2 tabs) for 7 days, 20mg (1 tabs) for 7 days, 10mg (1/2 tab) for 7 days, then take 5 mg daily from then on wards.     SIMETHICONE (GAS-X) 125 MG CAPSULE    Take 125 mg by mouth four (4) times daily as needed for Flatulence.     TIOTROPIUM BROMIDE (SPIRIVA RESPIMAT) 2.5 MCG/ACTUATION INHALER    Take 2 Puffs by inhalation daily. These Medications have changed     No medications on file   Stop Taking     No medications on file      Disclaimer: Sections of this note are dictated using utilizing voice recognition software.  Minor typographical errors may be present.  If questions arise, please do not hesitate to contact me or call our department.       Jeni Lamberto Parkinson, PGY-2  [address] [facility]      Revision History

## 2020-06-11 ENCOUNTER — PATIENT OUTREACH (OUTPATIENT)
Dept: CASE MANAGEMENT | Age: 60
End: 2020-06-11

## 2020-06-11 NOTE — PROGRESS NOTES
Called patient to follow up with how she is doing. Patient stated that she is continuing to improve., She stated that she is tapering down her prednisone as directed. She sated that she has been on it for almost 6 months due to issues. She stated that she is feeling better. She has her follow up appointments with pulmonary and PCP in the next couple weeks. Encouraged patient to stay in as much as possible. Also encouraged patient to wash her hands frequently and to wipe things down such as door knobs and faucets. Patient appreciated the call. Will continue to follow.

## 2020-06-18 ENCOUNTER — PATIENT OUTREACH (OUTPATIENT)
Dept: CASE MANAGEMENT | Age: 60
End: 2020-06-18

## 2020-06-18 NOTE — PROGRESS NOTES
Patient resolved from Transition of Care episode on 6/18/2020  Discussed COVID-19 related testing which was not done at this time. Test results were not done. Patient informed of results, if available? n/a     Patient/family has been provided the following resources and education related to COVID-19:                         Signs, symptoms and red flags related to COVID-19            [facility] exposure and quarantine guidelines            [facility] exposure contact - [phone]            Contact for their local Department of Health                 Patient currently reports that the following symptoms have improved:  shortness of breath. COPD is under control at present. No further outreach scheduled with this CTN/ACM/LPN/HC/ MA. Episode of Care resolved. Patient has this CTN/ACM/LPN/HC/MA contact information if future needs arise.

## 2020-07-01 ENCOUNTER — OFFICE VISIT (OUTPATIENT)
Dept: PULMONOLOGY | Age: 60
End: 2020-07-01

## 2020-07-01 VITALS
HEART RATE: 85 BPM | OXYGEN SATURATION: 97 % | DIASTOLIC BLOOD PRESSURE: 88 MMHG | WEIGHT: 262.6 LBS | BODY MASS INDEX: 46.53 KG/M2 | RESPIRATION RATE: 18 BRPM | HEIGHT: 63 IN | SYSTOLIC BLOOD PRESSURE: 173 MMHG | TEMPERATURE: 97.5 F

## 2020-07-01 DIAGNOSIS — J31.0 CHRONIC RHINITIS: ICD-10-CM

## 2020-07-01 DIAGNOSIS — E66.01 MORBID OBESITY (HCC): ICD-10-CM

## 2020-07-01 DIAGNOSIS — R06.02 SHORTNESS OF BREATH: ICD-10-CM

## 2020-07-01 DIAGNOSIS — Z79.52 CURRENT CHRONIC USE OF SYSTEMIC STEROIDS: ICD-10-CM

## 2020-07-01 DIAGNOSIS — G47.33 OSA (OBSTRUCTIVE SLEEP APNEA): ICD-10-CM

## 2020-07-01 DIAGNOSIS — Z87.891 PERSONAL HISTORY OF TOBACCO USE, PRESENTING HAZARDS TO HEALTH: ICD-10-CM

## 2020-07-01 DIAGNOSIS — J44.9 COPD, GROUP D, BY GOLD 2017 CLASSIFICATION (HCC): ICD-10-CM

## 2020-07-01 DIAGNOSIS — J45.51 POORLY CONTROLLED SEVERE PERSISTENT ASTHMA WITH ACUTE EXACERBATION: Primary | ICD-10-CM

## 2020-07-01 DIAGNOSIS — E66.2 OBESITY HYPOVENTILATION SYNDROME (HCC): ICD-10-CM

## 2020-07-01 DIAGNOSIS — J44.9 ASTHMA-COPD OVERLAP SYNDROME (HCC): ICD-10-CM

## 2020-07-01 DIAGNOSIS — J96.12 CHRONIC RESPIRATORY FAILURE WITH HYPOXIA AND HYPERCAPNIA (HCC): ICD-10-CM

## 2020-07-01 DIAGNOSIS — R06.09 DYSPNEA ON EXERTION: ICD-10-CM

## 2020-07-01 DIAGNOSIS — J96.11 CHRONIC RESPIRATORY FAILURE WITH HYPOXIA AND HYPERCAPNIA (HCC): ICD-10-CM

## 2020-07-01 RX ORDER — OMEPRAZOLE 40 MG/1
40 CAPSULE, DELAYED RELEASE ORAL DAILY
COMMUNITY
Start: 2018-03-09

## 2020-07-01 RX ORDER — INSULIN GLARGINE 100 [IU]/ML
45 INJECTION, SOLUTION SUBCUTANEOUS
Status: ON HOLD | COMMUNITY
End: 2020-12-18

## 2020-07-01 RX ORDER — IPRATROPIUM BROMIDE AND ALBUTEROL SULFATE 2.5; .5 MG/3ML; MG/3ML
SOLUTION RESPIRATORY (INHALATION)
COMMUNITY
Start: 2020-06-12 | End: 2020-07-07

## 2020-07-01 NOTE — PROGRESS NOTES
[address] [place] [address]  [phone]    [address] [place] [address] [facility]  Pulmonary, Critical Care, and Sleep Medicine    Pulmonary Hosp D/C F/U  Name: Priya Reinoso 60 y.o. female  MRN: [id number]  : 1960  Service Date: 20  Chief Complaint:   Chief Complaint   Patient presents with   [facility] Follow Up       History of Present Illness:  Priya Reinoso is a 60 y.o. female, who presents to Pulmonary clinic for hospital discharge follow-up with regards to acute exacerbation of asthma/COPD. Patient was recently admitted to [facility] from  through 2020 with acute exacerbation of COPD/asthma. Patient was treated with bronchodilators and prednisone. Patient was discharged on extended prednisone taper starting at 60 mg decreasing by 10 mg every 7 days, patient reports that she is now down to 20 mg daily. Patient reports that she does not like being on high-dose steroids because it affects her mood, patient reports she gets very worked up and easily agitated. Since discharge, patient reports ongoing dyspnea, which is unchanged. Patient reports she is compliant with Advair HFA, Spiriva, Flovent and Singulair. Patient reports dyspnea with minimal exertion, reports having to use PRN albuterol 3-4 times a day. Patient reports continuous use supplemental oxygen, 2 L with exertion and nightly. Patient reports dyspnea only mildly improved with PRN albuterol and with exertion. Symptoms worsened by weather, allergic triggers, exertion, dyspnea with ADLs, mMRC of 4. No other modifying factors. Patient denies fevers, chills, night sweats, nausea, vomiting, diarrhea, hemoptysis, angina.   Of note, patient reports she quit smoking in     Past Medical History:   Diagnosis Date    Asthma     Chronic lung disease     COPD     Cystocele, midline     Diabetes mellitus ([facility] [address]     GERD (gastroesophageal reflux disease)     Hidradenitis suppurativa     Hyperlipidemia     Hypertension     SHERRIE on CPAP     CPAP    Stress incontinence      Past Surgical History:   Procedure Laterality Date    BREAST SURGERY PROCEDURE UNLISTED      Right breast benign tumor removal    HX APPENDECTOMY      HX CHOLECYSTECTOMY      HX DILATION AND CURETTAGE      Dysfunctional uterine bleeding, thought 2/2 fibroids    HX TUBAL LIGATION       Family History   Problem Relation Age of Onset    Hypertension Mother     Stroke Mother     Breast Cancer Mother         Bilateral mastectomies    Cancer Mother         ovarian and breast    Heart Failure Mother     Heart Attack Father         2011    Heart Surgery Father         CABG    Heart Failure Father     COPD Sister         Heavy smoker    Cancer Sister         ovarian    Heart Failure Sister     Lung Disease Sister     Asthma Child     Cancer Maternal Aunt         pancreatic    Cancer Maternal Grandfather         stomach     Social History     Socioeconomic History    Marital status: LEGALLY      Spouse name: Not on file    Number of children: Not on file    Years of education: Not on file    Highest education level: Not on file   Occupational History    Not on file   Social Needs    Financial resource strain: Not on file    Food insecurity     Worry: Not on file     Inability: Not on file    Transportation needs     Medical: Not on file     Non-medical: Not on file   Tobacco Use    Smoking status: Former Smoker     Packs/day: 1.00     Years: 30.00     Pack years: 30.00     Types: Cigarettes     Start date: 1966     Last attempt to quit: 2006     Years since quittin.8    Smokeless tobacco: Former User    Tobacco comment: 1-1.5 packs per day   Substance and Sexual Activity    Alcohol use: No    Drug use: No    Sexual activity: Not Currently   Lifestyle    Physical activity     Days per week: Not on file     Minutes per session: Not on file    Stress: Not on file   Relationships    Social connections     Talks on phone: Not on file     Gets together: Not on file     Attends Congregational service: Not on file     Active member of club or organization: Not on file     Attends meetings of clubs or organizations: Not on file     Relationship status: Not on file    Intimate partner violence     Fear of current or ex partner: Not on file     Emotionally abused: Not on file     Physically abused: Not on file     Forced sexual activity: Not on file   Other Topics Concern    Not on file   Social History Narrative    Not on file       Allergies: I have reviewed the allergy hx  Allergies   Allergen Reactions    Ancef [Cefazolin] Hives    Contrast Agent [Iodine] Anaphylaxis, Shortness of Breath and Swelling     Needs pre-medication for IV contrast with Benadryl, Solu-Medrol    Fish Containing Products Anaphylaxis     Pt states she had a severe allergic reaction at 7 y/o.  Statins-Hmg-Coa Reductase Inhibitors Myalgia    Metformin Other (comments)     Abdominal pain, diarrhea.  Codeine Other (comments)     Altered mental status       Medications:  I have reviewed the patient's medications  Prior to Admission medications    Medication Sig Start Date End Date Taking? Authorizing Provider   albuterol-ipratropium (DUO-NEB) 2.5 mg-0.5 mg/3 ml nebu USE 1 VIAL IN NEBULIZER EVERY 4 HOURS AS NEEDED 6/12/20  Yes Provider, Historical   omeprazole (PRILOSEC) 40 mg capsule Take  by mouth. 3/9/18  Yes Provider, Historical   insulin glargine (Basaglar KwikPen U-100 Insulin) 100 unit/mL (3 mL) inpn 45 Units by SubCUTAneous route nightly. Yes Provider, Historical   fluticasone propionate (Flovent HFA) 220 mcg/actuation inhaler Take 1 Puff by inhalation two (2) times a day.  5/26/20  Yes Ricardo Sweeney MD   predniSONE (DELTASONE) 20 mg tablet Please take 60mg Prednisone(3 tabs) for 7 days, 50mg (2 1/2 tabs) for 7 days, 40mg(2 tabs) for 7 days, 30mg (1 1/2 tabs) for 7 days, 20mg (1 tabs) for 7 days, 10mg (1/2 tab) for 7 days, then take 5 mg daily from then on wards. 5/26/20  Yes Lilian Bryson MD   furosemide (Lasix) 20 mg tablet Take 20 mg by mouth daily. Yes Provider, Historical   azithromycin (ZITHROMAX) 250 mg tablet Take 1 Tab by mouth daily. Monday-Friday. 4/2/20  Yes Lilian Bryson MD   simethicone (GAS-X) 125 mg capsule Take 125 mg by mouth four (4) times daily as needed for Flatulence. Yes Provider, Historical   lisinopril (PRINIVIL, ZESTRIL) 20 mg tablet Take 20 mg by mouth two (2) times a day. Yes Provider, Historical   fluticasone propionate (FLONASE ALLERGY RELIEF) 50 mcg/actuation nasal spray 2 Sprays by Both Nostrils route daily. Yes Provider, Historical   fluticasone propion-salmeterol (ADVAIR HFA) 230-21 mcg/actuation inhaler Take 2 Puffs by inhalation two (2) times a day. Yes Provider, Historical   tiotropium bromide (SPIRIVA RESPIMAT) 2.5 mcg/actuation inhaler Take 2 Puffs by inhalation daily. Yes Provider, Historical   albuterol sulfate 90 mcg/actuation aepb Take 1 Puff by inhalation every four (4) hours. Patient taking differently: Take 1 Puff by inhalation every four (4) hours as needed. 8/25/18  Yes Ebony Paez MD   NOVOLOG FLEXPEN U-100 INSULIN 100 unit/mL inpn Continue home Sliding scale insulin as prior to admission  Patient taking differently: 1 Units by SubCUTAneous route three (3) times daily. If BG <100=0u  101-150=5u  151-250=8u  251-300=12u  >300 call MD   7/10/18  Yes Tara Tripp MD   OXYGEN-AIR DELIVERY SYSTEMS 2 L by Nasal route continuous. [facility]   Yes Provider, Historical   aspirin delayed-release 81 mg tablet Take 81 mg by mouth daily. Yes Provider, Historical   montelukast (SINGULAIR) 10 mg tablet Take 10 mg by mouth nightly. Yes Other, MD Lauren   insulin glargine (LANTUS,BASAGLAR) 100 unit/mL (3 mL) inpn 40 Units by SubCUTAneous route nightly. Please inject 40 units every bedtime.   Indications: type 2 diabetes mellitus 5/26/20   Bibiana Garcia MD   pantoprazole (PROTONIX) 40 mg tablet Take 1 Tab by mouth daily. 5/27/20   Bibiana Garcia MD   lactobacillus sp. 50 billion cpu (BIO-K PLUS) 50 billion cell -375 mg cap capsule Take 1 Cap by mouth daily. 3/10/20   Bibiana Garcia MD   loratadine (CLARITIN) 10 mg tablet Take 10 mg by mouth daily as needed for Allergies. Provider, Historical   albuterol sulfate (PROVENTIL;VENTOLIN) 2.5 mg/0.5 mL nebu nebulizer solution 0.5 mL by Nebulization route four (4) times daily as needed for Wheezing. To be used with HyperSal nebulizer solution. ICD code: COPD J44.1 7/2/19   Shauna Richards MD   hydroCHLOROthiazide (HYDRODIURIL) 12.5 mg tablet Take 12.5 mg by mouth daily. Provider, Historical       Immunizations:  I have reviewed the patient's immunizations  Immunization History   Administered Date(s) Administered    Influenza Vaccine 09/21/2009, 10/21/2011, 12/21/2012, 01/07/2014, 10/20/2014, 10/01/2015, 10/07/2016, 12/31/2019    Influenza Vaccine (Quad) PF 01/26/2018, 10/11/2018    Novel Influenza-H1N1-09, All Formulations 01/29/2010    Pneumococcal Conjugate (PCV-13) 12/31/2019    Pneumococcal Polysaccharide (PPSV-23) 10/11/2018       Review of Systems:  A complete review of systems was performed as stated in the HPI, all others are negative.       Objective:    Physical Exam:  /88 (BP 1 Location: Right arm, BP Patient Position: Sitting)   Pulse 85   Temp 97.5 °F (36.4 °C) (Oral)   Resp 18   Ht 5' 3\" (1.6 m)   Wt 119.1 kg (262 lb 9.6 oz)   SpO2 97% Comment: 2 LPM  BMI 46.52 kg/m²   Vitals were personally reviewed  Gen: no acute distress, pleasant and cooperative, sitting up in chair, wearing supplemental oxygen, ambulates slowly  HEENT: normocephalic/atraumatic, PERRLA, EOMI, no scleral icterus, no oral lesions, good dentition, Mallampati IV  Neck: supple, trachea midline, no JVD, no cervical and supraclavicular adenopathy  Chest: no lesions, with even rise and fall, no pectus excavatum or flail chest  CVS: regular rate rhythm, S1/S2, no murmurs/rubs/gallops  Lungs: Poor air entry B/L, CTA BL, no wheezes/rales/rhonchi  Back: no kyphosis or scoliosis  Abdomen: Obese, soft, nontender, bowel sounds present, no hepatosplenomegaly  Ext: no pitting edema B/L, no peripheral cyanosis or clubbing  Neuro: grossly normal, AAOx3, normal strength and coordination grossly, no focal deficits  Skin: no rashes, erythema, lesions  Psych: normal memory, thought content, and processing    Labs: I have reviewed the patient's available labs  Lab Results   Component Value Date/Time    WBC 10.6 06/03/2020 11:05 PM    HGB 13.1 06/03/2020 11:05 PM    HCT 38.0 06/03/2020 11:05 PM    PLATELET 706 03/43/7062 11:05 PM    MCV 90.5 06/03/2020 11:05 PM     Lab Results   Component Value Date/Time    Sodium 142 06/03/2020 11:05 PM    Potassium 3.7 06/03/2020 11:05 PM    Chloride 103 06/03/2020 11:05 PM    CO2 31 06/03/2020 11:05 PM    Anion gap 8 06/03/2020 11:05 PM    Glucose 165 (H) 06/03/2020 11:05 PM    BUN 18 06/03/2020 11:05 PM    Creatinine 1.03 06/03/2020 11:05 PM    BUN/Creatinine ratio 17 06/03/2020 11:05 PM    GFR est AA >60 06/03/2020 11:05 PM    GFR est non-AA 55 (L) 06/03/2020 11:05 PM    Calcium 9.5 06/03/2020 11:05 PM    Bilirubin, total 0.6 06/03/2020 11:05 PM    Alk. phosphatase 67 06/03/2020 11:05 PM    Protein, total 7.5 06/03/2020 11:05 PM    Albumin 3.8 06/03/2020 11:05 PM    Globulin 3.7 06/03/2020 11:05 PM    A-G Ratio 1.0 06/03/2020 11:05 PM    ALT (SGPT) 49 06/03/2020 11:05 PM    AST (SGOT) 17 06/03/2020 11:05 PM     Imaging:  I have personally reviewed patient's imaging -- no new imaging in the interval  CT Results (most recent):  Results from Hospital Encounter encounter on 04/08/20   CT ABD PELV WO CONT    Narrative CT ABDOMEN AND PELVIS WITHOUT ENHANCEMENT    INDICATION: Nausea, right lower quadrant pain, vomiting since this morning.     TECHNIQUE: Axial images obtained of the abdomen and pelvis without intravenous  contrast. Coronal and sagittal reformatted images of the abdomen and pelvis were  obtained. All CT scans at this facility are performed using dose optimization technique as  appropriate to a performed exam, to include automated exposure control,  adjustment of the mA and/or kV according to patient size (including appropriate  matching first site-specific examinations), or use of iterative reconstruction  technique. COMPARISON: 7/14/2019. Lack of intravenous contrast renders this study suboptimal for evaluating the  solid abdominal organs, vasculature and bowel. ABDOMEN FINDINGS:     Liver: Liver is 22.5 cm craniocaudal with mild decreased density. Spleen: Unremarkable. Pancreas: Unremarkable. Biliary: Cholecystectomy. Bowel: Mild diverticulosis. Similar small bowel in a periumbilical hernia. Peritoneum/ Retroperitoneum: Unremarkable. Lymph Nodes: Unremarkable. Adrenal Glands: Unremarkable. Kidneys: Unremarkable. Vessels: Moderate atherosclerosis. Multifocal stenosis of the visceral ostia. PELVIS FINDINGS:     Bladder/ Pelvic Organs: Unremarkable. Lung Base: Severe coronary arteriosclerosis left coronary artery and LAD. Centrilobular emphysema. Subsegmental atelectasis at the lung bases. Bones/Soft tissues: Degenerative changes bilateral hips. Degenerative disc  disease. Impression IMPRESSION:    Unchanged small bowel containing periumbilical hernia without associated  obstruction. Hepatomegaly and mild steatosis. Mild diverticulosis. Moderate atherosclerosis with multifocal visceral ostia stenosis. Severe left coronary arteriosclerosis. Emphysema. PFTs:  I have reviewed the patient's PFTs -- no new studies    TTE:  I have reviewed the patient's TTE results  05/21/20   ECHO ADULT COMPLETE 05/25/2020 5/25/2020    Narrative · Image quality for this study was technically difficult. Contrast used:   DEFINITY.   · Normal cavity size and systolic function (ejection fraction normal). Moderate concentric hypertrophy. Estimated left ventricular ejection   fraction is 65 - 70%. Visually measured ejection fraction. No regional   wall motion abnormality noted. Moderate (grade 2) left ventricular   diastolic dysfunction. · Mildly dilated left atrium. · Pulmonary hypertension. Pulmonary arterial systolic pressure is 61 mmHg. · Severely elevated central venous pressure (15+ mmHg); IVC diameter is   larger than 21 mm and collapses less than 50% with respiration. Signed by: David Bee MD       Assessment and Plan:  60 y.o. female with:    Impression:  1. Acute exacerbation of COPD/asthma overlap syndrome requiring hospitalization: Patient discharged on extended prednisone taper, on chronic steroid therapy  2. Underlying poorly controlled severe persistent asthma with steroid dependence: Patient has a strong asthma component with significant bronchospasm to a wide variety of classic environmental triggers, was on SCIT therapy in the past.  Despite chronic steroids, patient's absolute eosinophil count is 100 on 6/3/2020 and 200 on 3/24/2020  3. COPD, gold risk category D  4. Chronic hypoxic and hypercapnic respiratory failure  5. Morbid obesity: Body mass index is 46.52 kg/m². 6.  Obesity hypoventilation syndrome with SHERRIE: Patient on trilogy in the [address] [place] [address] setting  7. Dyspnea/shortness of breath: Multifactorial, due to above  8. Chronic rhinitis  9. Chronic steroid dependence: Noted above    Plan:  -Due to recurrent exacerbations in the setting of poorly controlled, severe persistent asthma and patient on maximal medical therapy, I strongly advised patient to consider biologic therapy. Discussed patient's recent lab work showing significant eosinophilia in the setting of chronic steroid dependence, I discussed the possibility of benralizumab subcu every other month.   I reviewed the risks and benefits including the risks of anaphylaxis/delayed anaphylaxis, injection site reaction, as well as side effects of headache and pharyngitis. Patient was agreeable to initiating therapy if approved by her insurance. Forms filled out in clinic and given ancillary staff for prior authorization.  -Start benralizumab 30 mg subcu every 4 weeks for the first 3 doses followed by every 8 weeks thereafter  -With regards to prednisone, since patient is having worsening mood symptoms, advised the patient to taper to 10 mg daily for 7 days followed by 5 mg daily thereafter until seen in clinic. Long-term plan will be to wean off of steroids once benralizumab therapy has been maximized  -With regards to COPD, continue chronic azithromycin therapy, adjust regimen to azithromycin 250 mg p.o. every Monday Wednesday Friday. Counseled patient to obtain EKG in 1 month  -Continue dual ICS/LABA/LAMA/LTRA therapy  -Continue Advair /21 MCG 2 puffs twice daily. Counseled patient rinse mouth thoroughly after use  -Continue Flovent  mcg 1 puff twice daily. Counseled patient to rinse mouth well after each use  -Continue Spiriva Respimat 2 puffs daily  -Continue Singulair 10 mg nightly  -Continue DuoNebs as needed  -Continue Albuterol HFA 1-2puffs q4-6h PRN. Counseled patient that this is their rescue inhaler. Also counseled patient regarding premedication 15-30m prior to exercise or exposure to very cold air  -Counseled patient on proper inhaler technique  -Counseled patient to avoid potential triggers of asthma  -Counseled regarding weight loss  -Counseled regarding deleterious health effects of chronic steroid therapy. Counseled patient to follow-up with ophthalmology as scheduled. We will order DEXA scan  -Continue Claritin daily  -Continue trilogy nightly and PRN during daytime. Counseled patient to change mask/tubing/filters every 3 to 6 months.   Counseled patient against sleepy driving.  -Continue supplemental oxygen 2 L with exertion and blended into AVAPS nightly  -CT lung cancer screening ordered(quit smoking 14 years ago), I advised patient that we will perform lung cancer screening 15 years after she has quit smoking (should end next year). Shared decision-making performed  -Immunizations reviewed, influenza and pneumococcal vaccinations up-to-date      Follow-up and Dispositions    · Return in about 2 months (around 9/1/2020). Orders Placed This Encounter    albuterol-ipratropium (DUO-NEB) 2.5 mg-0.5 mg/3 ml nebu     45 minutes were spent in this patient encounter with greater than 50% that time spent face-to-face counseling regarding the above. Spent much of this time going over biologic therapy for asthma along with prednisone taper and chronic prednisone therapy and other salvage therapy for COPD.     [employer]  MD/MPH     Pulmonary, Critical Care Medicine  [facility]

## 2020-07-01 NOTE — PROGRESS NOTES
Renita Mullen presents today for   Chief Complaint   Patient presents with   [facility] Follow Up       Is someone accompanying this pt? No    Is the patient using any DME equipment during OV? Oxygen    -D[employer]     Depression Screening:  3 most recent PHQ Screens 2/3/2020   Little interest or pleasure in doing things Not at all   Feeling down, depressed, irritable, or hopeless Not at all   Total Score PHQ 2 0       Learning Assessment:  Learning Assessment 4/9/2018   PRIMARY LEARNER Patient   HIGHEST LEVEL OF EDUCATION - PRIMARY LEARNER  SOME COLLEGE   BARRIERS PRIMARY LEARNER -   PRIMARY LANGUAGE ENGLISH   LEARNER PREFERENCE PRIMARY READING   ANSWERED BY patient Juice Braga   RELATIONSHIP SELF       Abuse Screening:  No flowsheet data found. Fall Risk  Fall Risk Assessment, last 12 mths 2/6/2018   Able to walk? Yes   Fall in past 12 months? No         Coordination of Care:  1. Have you been to the ER, urgent care clinic since your last visit? Hospitalized since your last visit? Yes; Name: [facility] 5/20  SO[facility]    2. Have you seen or consulted any other health care providers outside of the [address] [place] [address] since your last visit? Include any pap smears or colon screening.  [address]

## 2020-07-02 RX ORDER — PREDNISONE 5 MG/1
5 TABLET ORAL DAILY
Qty: 60 TAB | Refills: 1 | Status: SHIPPED | OUTPATIENT
Start: 2020-07-02 | End: 2020-07-18

## 2020-07-02 RX ORDER — AZITHROMYCIN 250 MG/1
250 TABLET, FILM COATED ORAL
Qty: 30 TAB | Refills: 0
Start: 2020-07-03 | End: 2020-11-05 | Stop reason: SDUPTHER

## 2020-07-06 ENCOUNTER — HOSPITAL ENCOUNTER (EMERGENCY)
Age: 60
Discharge: HOME | DRG: 177 | End: 2020-07-07
Attending: EMERGENCY MEDICINE
Payer: MEDICARE

## 2020-07-06 ENCOUNTER — TELEPHONE (OUTPATIENT)
Dept: PULMONOLOGY | Age: 60
End: 2020-07-06

## 2020-07-06 ENCOUNTER — APPOINTMENT (OUTPATIENT)
Dept: GENERAL RADIOLOGY | Age: 60
DRG: 177 | End: 2020-07-06
Attending: EMERGENCY MEDICINE
Payer: MEDICARE

## 2020-07-06 DIAGNOSIS — Z20.822 SUSPECTED COVID-19 VIRUS INFECTION: ICD-10-CM

## 2020-07-06 DIAGNOSIS — J44.1 COPD EXACERBATION (HCC): Primary | ICD-10-CM

## 2020-07-06 LAB
ALBUMIN SERPL-MCNC: 3.8 G/DL (ref 3.4–5)
ALBUMIN/GLOB SERPL: 1.1 {RATIO} (ref 0.8–1.7)
ALP SERPL-CCNC: 77 U/L (ref 45–117)
ALT SERPL-CCNC: 60 U/L (ref 13–56)
ANION GAP SERPL CALC-SCNC: 5 MMOL/L (ref 3–18)
AST SERPL-CCNC: 27 U/L (ref 10–38)
BASOPHILS # BLD: 0 K/UL (ref 0–0.1)
BASOPHILS NFR BLD: 0 % (ref 0–2)
BILIRUB SERPL-MCNC: 0.4 MG/DL (ref 0.2–1)
BUN SERPL-MCNC: 12 MG/DL (ref 7–18)
BUN/CREAT SERPL: 13 (ref 12–20)
CALCIUM SERPL-MCNC: 9.8 MG/DL (ref 8.5–10.1)
CHLORIDE SERPL-SCNC: 103 MMOL/L (ref 100–111)
CK MB CFR SERPL CALC: NORMAL % (ref 0–4)
CK MB SERPL-MCNC: <1 NG/ML (ref 5–25)
CK SERPL-CCNC: 67 U/L (ref 26–192)
CO2 SERPL-SCNC: 29 MMOL/L (ref 21–32)
CREAT SERPL-MCNC: 0.94 MG/DL (ref 0.6–1.3)
DIFFERENTIAL METHOD BLD: ABNORMAL
EOSINOPHIL # BLD: 0 K/UL (ref 0–0.4)
EOSINOPHIL NFR BLD: 0 % (ref 0–5)
ERYTHROCYTE [DISTWIDTH] IN BLOOD BY AUTOMATED COUNT: 14 % (ref 11.6–14.5)
GLOBULIN SER CALC-MCNC: 3.5 G/DL (ref 2–4)
GLUCOSE SERPL-MCNC: 186 MG/DL (ref 74–99)
HCT VFR BLD AUTO: 39 % (ref 35–45)
HGB BLD-MCNC: 13.1 G/DL (ref 12–16)
LYMPHOCYTES # BLD: 0.9 K/UL (ref 0.9–3.6)
LYMPHOCYTES NFR BLD: 13 % (ref 21–52)
MCH RBC QN AUTO: 30.6 PG (ref 24–34)
MCHC RBC AUTO-ENTMCNC: 33.6 G/DL (ref 31–37)
MCV RBC AUTO: 91.1 FL (ref 74–97)
MONOCYTES # BLD: 0.6 K/UL (ref 0.05–1.2)
MONOCYTES NFR BLD: 9 % (ref 3–10)
NEUTS SEG # BLD: 5.2 K/UL (ref 1.8–8)
NEUTS SEG NFR BLD: 78 % (ref 40–73)
PLATELET # BLD AUTO: 246 K/UL (ref 135–420)
PMV BLD AUTO: 9.1 FL (ref 9.2–11.8)
POTASSIUM SERPL-SCNC: 3.7 MMOL/L (ref 3.5–5.5)
PROT SERPL-MCNC: 7.3 G/DL (ref 6.4–8.2)
RBC # BLD AUTO: 4.28 M/UL (ref 4.2–5.3)
SODIUM SERPL-SCNC: 137 MMOL/L (ref 136–145)
TROPONIN I SERPL-MCNC: <0.02 NG/ML (ref 0–0.04)
WBC # BLD AUTO: 6.7 K/UL (ref 4.6–13.2)

## 2020-07-06 PROCEDURE — 71045 X-RAY EXAM CHEST 1 VIEW: CPT

## 2020-07-06 PROCEDURE — 87635 SARS-COV-2 COVID-19 AMP PRB: CPT

## 2020-07-06 PROCEDURE — 99285 EMERGENCY DEPT VISIT HI MDM: CPT

## 2020-07-06 PROCEDURE — 94640 AIRWAY INHALATION TREATMENT: CPT

## 2020-07-06 PROCEDURE — 80053 COMPREHEN METABOLIC PANEL: CPT

## 2020-07-06 PROCEDURE — 82550 ASSAY OF CK (CPK): CPT

## 2020-07-06 PROCEDURE — 74011000250 HC RX REV CODE- 250: Performed by: EMERGENCY MEDICINE

## 2020-07-06 PROCEDURE — 93005 ELECTROCARDIOGRAM TRACING: CPT

## 2020-07-06 PROCEDURE — 74011250637 HC RX REV CODE- 250/637: Performed by: EMERGENCY MEDICINE

## 2020-07-06 PROCEDURE — 85025 COMPLETE CBC W/AUTO DIFF WBC: CPT

## 2020-07-06 RX ORDER — IPRATROPIUM BROMIDE AND ALBUTEROL SULFATE 2.5; .5 MG/3ML; MG/3ML
3 SOLUTION RESPIRATORY (INHALATION)
Status: COMPLETED | OUTPATIENT
Start: 2020-07-06 | End: 2020-07-06

## 2020-07-06 RX ORDER — ACETAMINOPHEN 500 MG
1000 TABLET ORAL
Status: COMPLETED | OUTPATIENT
Start: 2020-07-06 | End: 2020-07-06

## 2020-07-06 RX ORDER — DEXAMETHASONE 6 MG/1
TABLET ORAL
Qty: 5 TAB | Refills: 0 | Status: SHIPPED | OUTPATIENT
Start: 2020-07-06 | End: 2020-07-18

## 2020-07-06 RX ADMIN — IPRATROPIUM BROMIDE AND ALBUTEROL SULFATE 3 ML: .5; 3 SOLUTION RESPIRATORY (INHALATION) at 21:30

## 2020-07-06 RX ADMIN — IPRATROPIUM BROMIDE AND ALBUTEROL SULFATE 3 ML: .5; 3 SOLUTION RESPIRATORY (INHALATION) at 22:28

## 2020-07-06 RX ADMIN — ACETAMINOPHEN 1000 MG: 500 TABLET, FILM COATED ORAL at 22:28

## 2020-07-06 NOTE — TELEPHONE ENCOUNTER
Pt states her 21 yr old granddaughter who lives with her was tested for Covid over the weekend and was called today and is positive. Also her daughter was feeling bad and went to get tested and her results are not back yet. The patient is going tonight at 5pm when her son is able to take her. She has been feeling more sob the past few days. No fever or cough.

## 2020-07-06 NOTE — TELEPHONE ENCOUNTER
Pt called([phone]). Her granddaughter has tested positive for covid. She wants to know should she get tested and where.

## 2020-07-07 ENCOUNTER — PATIENT OUTREACH (OUTPATIENT)
Dept: CASE MANAGEMENT | Age: 60
End: 2020-07-07

## 2020-07-07 ENCOUNTER — APPOINTMENT (OUTPATIENT)
Dept: GENERAL RADIOLOGY | Age: 60
DRG: 177 | End: 2020-07-07
Attending: PHYSICIAN ASSISTANT
Payer: MEDICARE

## 2020-07-07 VITALS
DIASTOLIC BLOOD PRESSURE: 43 MMHG | OXYGEN SATURATION: 100 % | TEMPERATURE: 98.8 F | HEART RATE: 87 BPM | SYSTOLIC BLOOD PRESSURE: 126 MMHG | RESPIRATION RATE: 23 BRPM

## 2020-07-07 VITALS
RESPIRATION RATE: 25 BRPM | TEMPERATURE: 98.2 F | SYSTOLIC BLOOD PRESSURE: 126 MMHG | OXYGEN SATURATION: 97 % | DIASTOLIC BLOOD PRESSURE: 56 MMHG | HEART RATE: 83 BPM

## 2020-07-07 DIAGNOSIS — R06.02 SOB (SHORTNESS OF BREATH): ICD-10-CM

## 2020-07-07 DIAGNOSIS — Z20.822 SUSPECTED COVID-19 VIRUS INFECTION: ICD-10-CM

## 2020-07-07 DIAGNOSIS — J44.1 COPD EXACERBATION (HCC): Primary | ICD-10-CM

## 2020-07-07 LAB
ALBUMIN SERPL-MCNC: 3.4 G/DL (ref 3.4–5)
ALBUMIN/GLOB SERPL: 0.9 {RATIO} (ref 0.8–1.7)
ALP SERPL-CCNC: 76 U/L (ref 45–117)
ALT SERPL-CCNC: 63 U/L (ref 13–56)
ANION GAP SERPL CALC-SCNC: 6 MMOL/L (ref 3–18)
APPEARANCE UR: CLEAR
AST SERPL-CCNC: 34 U/L (ref 10–38)
ATRIAL RATE: 95 BPM
BACTERIA URNS QL MICRO: ABNORMAL /HPF
BASOPHILS # BLD: 0 K/UL (ref 0–0.1)
BASOPHILS NFR BLD: 0 % (ref 0–2)
BILIRUB SERPL-MCNC: 0.8 MG/DL (ref 0.2–1)
BILIRUB UR QL: NEGATIVE
BNP SERPL-MCNC: 136 PG/ML (ref 0–900)
BUN SERPL-MCNC: 9 MG/DL (ref 7–18)
BUN/CREAT SERPL: 10 (ref 12–20)
CALCIUM SERPL-MCNC: 9.4 MG/DL (ref 8.5–10.1)
CALCULATED P AXIS, ECG09: 46 DEGREES
CALCULATED R AXIS, ECG10: 31 DEGREES
CALCULATED T AXIS, ECG11: 54 DEGREES
CHLORIDE SERPL-SCNC: 102 MMOL/L (ref 100–111)
CK MB CFR SERPL CALC: NORMAL % (ref 0–4)
CK MB SERPL-MCNC: <1 NG/ML (ref 5–25)
CK SERPL-CCNC: 48 U/L (ref 26–192)
CO2 SERPL-SCNC: 28 MMOL/L (ref 21–32)
COLOR UR: YELLOW
CREAT SERPL-MCNC: 0.91 MG/DL (ref 0.6–1.3)
DIAGNOSIS, 93000: NORMAL
DIFFERENTIAL METHOD BLD: ABNORMAL
EOSINOPHIL # BLD: 0 K/UL (ref 0–0.4)
EOSINOPHIL NFR BLD: 0 % (ref 0–5)
EPITH CASTS URNS QL MICRO: ABNORMAL /LPF (ref 0–5)
ERYTHROCYTE [DISTWIDTH] IN BLOOD BY AUTOMATED COUNT: 14 % (ref 11.6–14.5)
GLOBULIN SER CALC-MCNC: 3.9 G/DL (ref 2–4)
GLUCOSE SERPL-MCNC: 179 MG/DL (ref 74–99)
GLUCOSE UR STRIP.AUTO-MCNC: 500 MG/DL
HCT VFR BLD AUTO: 35.9 % (ref 35–45)
HGB BLD-MCNC: 12.2 G/DL (ref 12–16)
HGB UR QL STRIP: NEGATIVE
KETONES UR QL STRIP.AUTO: ABNORMAL MG/DL
LACTATE BLD-SCNC: 1.3 MMOL/L (ref 0.4–2)
LEUKOCYTE ESTERASE UR QL STRIP.AUTO: ABNORMAL
LYMPHOCYTES # BLD: 1.1 K/UL (ref 0.9–3.6)
LYMPHOCYTES NFR BLD: 17 % (ref 21–52)
MCH RBC QN AUTO: 31 PG (ref 24–34)
MCHC RBC AUTO-ENTMCNC: 34 G/DL (ref 31–37)
MCV RBC AUTO: 91.1 FL (ref 74–97)
MONOCYTES # BLD: 0.7 K/UL (ref 0.05–1.2)
MONOCYTES NFR BLD: 11 % (ref 3–10)
MUCOUS THREADS URNS QL MICRO: ABNORMAL /LPF
NEUTS SEG # BLD: 4.7 K/UL (ref 1.8–8)
NEUTS SEG NFR BLD: 72 % (ref 40–73)
NITRITE UR QL STRIP.AUTO: NEGATIVE
P-R INTERVAL, ECG05: 144 MS
PH UR STRIP: 5.5 [PH] (ref 5–8)
PLATELET # BLD AUTO: 219 K/UL (ref 135–420)
PMV BLD AUTO: 9 FL (ref 9.2–11.8)
POTASSIUM SERPL-SCNC: 3.7 MMOL/L (ref 3.5–5.5)
PROT SERPL-MCNC: 7.3 G/DL (ref 6.4–8.2)
PROT UR STRIP-MCNC: ABNORMAL MG/DL
Q-T INTERVAL, ECG07: 356 MS
QRS DURATION, ECG06: 90 MS
QTC CALCULATION (BEZET), ECG08: 447 MS
RBC # BLD AUTO: 3.94 M/UL (ref 4.2–5.3)
SODIUM SERPL-SCNC: 136 MMOL/L (ref 136–145)
SP GR UR REFRACTOMETRY: 1.02 (ref 1–1.03)
TROPONIN I SERPL-MCNC: <0.02 NG/ML (ref 0–0.04)
UROBILINOGEN UR QL STRIP.AUTO: 1 EU/DL (ref 0.2–1)
VENTRICULAR RATE, ECG03: 95 BPM
WBC # BLD AUTO: 6.5 K/UL (ref 4.6–13.2)
WBC URNS QL MICRO: ABNORMAL /HPF (ref 0–5)

## 2020-07-07 PROCEDURE — 85025 COMPLETE CBC W/AUTO DIFF WBC: CPT

## 2020-07-07 PROCEDURE — 93005 ELECTROCARDIOGRAM TRACING: CPT

## 2020-07-07 PROCEDURE — 81001 URINALYSIS AUTO W/SCOPE: CPT

## 2020-07-07 PROCEDURE — 94640 AIRWAY INHALATION TREATMENT: CPT

## 2020-07-07 PROCEDURE — 87086 URINE CULTURE/COLONY COUNT: CPT

## 2020-07-07 PROCEDURE — 83605 ASSAY OF LACTIC ACID: CPT

## 2020-07-07 PROCEDURE — 96365 THER/PROPH/DIAG IV INF INIT: CPT

## 2020-07-07 PROCEDURE — 87040 BLOOD CULTURE FOR BACTERIA: CPT

## 2020-07-07 PROCEDURE — 82550 ASSAY OF CK (CPK): CPT

## 2020-07-07 PROCEDURE — 74011250637 HC RX REV CODE- 250/637: Performed by: PHYSICIAN ASSISTANT

## 2020-07-07 PROCEDURE — 74011250636 HC RX REV CODE- 250/636: Performed by: PHYSICIAN ASSISTANT

## 2020-07-07 PROCEDURE — 87635 SARS-COV-2 COVID-19 AMP PRB: CPT

## 2020-07-07 PROCEDURE — 96375 TX/PRO/DX INJ NEW DRUG ADDON: CPT

## 2020-07-07 PROCEDURE — 83880 ASSAY OF NATRIURETIC PEPTIDE: CPT

## 2020-07-07 PROCEDURE — 80053 COMPREHEN METABOLIC PANEL: CPT

## 2020-07-07 PROCEDURE — 71045 X-RAY EXAM CHEST 1 VIEW: CPT

## 2020-07-07 PROCEDURE — 74011000250 HC RX REV CODE- 250: Performed by: PHYSICIAN ASSISTANT

## 2020-07-07 PROCEDURE — 99285 EMERGENCY DEPT VISIT HI MDM: CPT

## 2020-07-07 RX ORDER — ACETAMINOPHEN 500 MG
1000 TABLET ORAL
Status: COMPLETED | OUTPATIENT
Start: 2020-07-07 | End: 2020-07-07

## 2020-07-07 RX ORDER — SODIUM CHLORIDE 0.9 % (FLUSH) 0.9 %
5-10 SYRINGE (ML) INJECTION AS NEEDED
Status: DISCONTINUED | OUTPATIENT
Start: 2020-07-07 | End: 2020-07-08 | Stop reason: HOSPADM

## 2020-07-07 RX ORDER — FLUTICASONE PROPIONATE 220 UG/1
1 AEROSOL, METERED RESPIRATORY (INHALATION) 2 TIMES DAILY
Qty: 1 INHALER | Refills: 0 | Status: SHIPPED | OUTPATIENT
Start: 2020-07-07 | End: 2020-08-12

## 2020-07-07 RX ORDER — BENZONATATE 100 MG/1
100 CAPSULE ORAL
Qty: 30 CAP | Refills: 0 | Status: SHIPPED | OUTPATIENT
Start: 2020-07-07 | End: 2020-07-18

## 2020-07-07 RX ORDER — ALBUTEROL SULFATE 90 UG/1
2 AEROSOL, METERED RESPIRATORY (INHALATION)
Qty: 1 INHALER | Refills: 0 | Status: SHIPPED | OUTPATIENT
Start: 2020-07-07

## 2020-07-07 RX ORDER — MAGNESIUM SULFATE HEPTAHYDRATE 40 MG/ML
2 INJECTION, SOLUTION INTRAVENOUS
Status: COMPLETED | OUTPATIENT
Start: 2020-07-07 | End: 2020-07-07

## 2020-07-07 RX ORDER — IPRATROPIUM BROMIDE AND ALBUTEROL SULFATE 2.5; .5 MG/3ML; MG/3ML
3 SOLUTION RESPIRATORY (INHALATION)
Qty: 30 NEBULE | Refills: 0 | Status: SHIPPED | OUTPATIENT
Start: 2020-07-07 | End: 2020-08-12

## 2020-07-07 RX ORDER — IPRATROPIUM BROMIDE AND ALBUTEROL SULFATE 2.5; .5 MG/3ML; MG/3ML
3 SOLUTION RESPIRATORY (INHALATION)
Status: COMPLETED | OUTPATIENT
Start: 2020-07-07 | End: 2020-07-07

## 2020-07-07 RX ADMIN — METHYLPREDNISOLONE SODIUM SUCCINATE 125 MG: 125 INJECTION, POWDER, FOR SOLUTION INTRAMUSCULAR; INTRAVENOUS at 18:23

## 2020-07-07 RX ADMIN — SODIUM CHLORIDE 1000 ML: 9 INJECTION, SOLUTION INTRAVENOUS at 18:22

## 2020-07-07 RX ADMIN — IPRATROPIUM BROMIDE AND ALBUTEROL SULFATE 3 ML: .5; 3 SOLUTION RESPIRATORY (INHALATION) at 18:24

## 2020-07-07 RX ADMIN — ACETAMINOPHEN 1000 MG: 500 TABLET, FILM COATED ORAL at 18:24

## 2020-07-07 RX ADMIN — MAGNESIUM SULFATE IN WATER 2 G: 40 INJECTION, SOLUTION INTRAVENOUS at 18:21

## 2020-07-07 NOTE — ED NOTES
I have reviewed discharge instructions with the patient. The patient verbalized understanding. Patient armband removed and given to patient to take home. Patient was informed of the privacy risks if armband lost or stolen. Patient alert and oriented x 4. Patient utilized a wheelchair to [employer].

## 2020-07-07 NOTE — DISCHARGE INSTRUCTIONS
Patient Education        Chronic Obstructive Pulmonary Disease (COPD): Care Instructions  Your Care Instructions     Chronic obstructive pulmonary disease (COPD) is a general term for a group of lung diseases, including emphysema and chronic bronchitis. People with COPD have decreased airflow in and out of the lungs, which makes it hard to breathe. The airways also can get clogged with thick mucus. Cigarette smoking is a major cause of COPD. Although there is no cure for COPD, you can slow its progress. Following your treatment plan and taking care of yourself can help you feel better and live longer. Follow-up care is a key part of your treatment and safety. Be sure to make and go to all appointments, and call your doctor if you are having problems. It's also a good idea to know your test results and keep a list of the medicines you take. How can you care for yourself at home? Staying healthy  · Do not smoke. This is the most important step you can take to prevent more damage to your lungs. If you need help quitting, talk to your doctor about stop-smoking programs and medicines. These can increase your chances of quitting for good. · Avoid colds and flu. Get a pneumococcal vaccine shot. If you have had one before, ask your doctor whether you need a second dose. Get the flu vaccine every fall. If you must be around people with colds or the flu, wash your hands often. · Avoid secondhand smoke, air pollution, and high altitudes. Also avoid cold, dry air and hot, humid air. Stay at home with your windows closed when air pollution is bad. Medicines and oxygen therapy  · Take your medicines exactly as prescribed. Call your doctor if you think you are having a problem with your medicine. You may be taking medicines such as:  ? Bronchodilators. These help open your airways and make breathing easier. They are either short-acting (work for 6 to 9 hours) or long-acting (work for 24 hours).  You inhale most bronchodilators, so they start to act quickly. Always carry your quick-relief inhaler with you in case you need it while you are away from home. ? Corticosteroids (prednisone, budesonide). These reduce airway inflammation. They come in pill or inhaled form. You must take these medicines every day for them to work well. · Ask your doctor or pharmacist if a spacer is right for you. A spacer may help you get more inhaled medicine to your lungs. If you use one, ask how to use it properly. · Do not take any vitamins, over-the-counter medicine, or herbal products without talking to your doctor first.  · If your doctor prescribed antibiotics, take them as directed. Do not stop taking them just because you feel better. You need to take the full course of antibiotics. · If you use oxygen therapy, use the flow rate your doctor has recommended. Don't change it without talking to your doctor first. Oxygen therapy boosts the amount of oxygen in your blood and helps you breathe easier. Activity  · Get regular exercise. Walking is an easy way to get exercise. Start out slowly, and walk a little more each day. · Pay attention to your breathing. You are exercising too hard if you can't talk while you exercise. · Take short rest breaks when doing household chores and other activities. · Learn breathing methods--such as breathing through pursed lips--to help you become less short of breath. · If your doctor has not set you up with a pulmonary rehabilitation program, ask if rehab is right for you. Rehab includes exercise programs, education about your disease and how to manage it, help with diet and other changes, and emotional support. Diet  · Eat regular, healthy meals. Use bronchodilators about 1 hour before you eat to make it easier to eat. Eat several small meals instead of three large ones. Drink beverages at the end of the meal. Avoid foods that are hard to chew.   · Eat foods that contain protein so you don't lose muscle mass. · Talk with your doctor if you gain too much weight or if you lose weight without trying. Mental health  · Talk to your family, friends, or a therapist about your feelings. Some people feel frightened, angry, hopeless, helpless, and even guilty. Talking openly about bad feelings can help you cope. If these feelings last, talk to your doctor. When should you call for help? [id number] anytime you think you may need emergency care. For example, call if:  · You have severe trouble breathing. Call your doctor now or seek immediate medical care if:  · You have new or worse trouble breathing. · You cough up blood. · You have a fever. Watch closely for changes in your health, and be sure to contact your doctor if:  · You cough more deeply or more often, especially if you notice more mucus or a change in the color of your mucus. · You have new or worse swelling in your legs or belly. · You are not getting better as expected. Where can you learn more? Go to http://www.gray.com/  Enter K457 in the search box to learn more about \"Chronic Obstructive Pulmonary Disease (COPD): Care Instructions. \"  Current as of: February 24, 2020               Content Version: 12.5  © 1860-3754 [employer]. Care instructions adapted under license by [employer] (which disclaims liability or warranty for this information). If you have questions about a medical condition or this instruction, always ask your healthcare professional. [address] any warranty or liability for your use of this information.

## 2020-07-07 NOTE — ED TRIAGE NOTES
Pt with c/o SOB. States that she was seen last night for same but feels worse. Pt reports that her granddaughter recently tested positive for COVID. Pt with hx of COPD and CHF.

## 2020-07-07 NOTE — ED TRIAGE NOTES
Pt reports having various symptoms over the last few days. Pt states she has generalized body aches, SOB, cough and chills. Pt states her adult daughter that lives with her was just tested and found to have COVID 19. Pt is on home oxygen for COPD and has labored breathing. Additional Progress Note...

## 2020-07-07 NOTE — ED PROVIDER NOTES
EMERGENCY DEPARTMENT HISTORY AND PHYSICAL EXAM    Date: 7/7/2020  Patient Name: Priya Reinoso    History of Presenting Illness     Chief Complaint   Patient presents with    Shortness of Breath    Concern For COVID-19 (Coronavirus)         History Provided By:patient    Chief Complaint: SOB, cough, fever  Duration: few days   Timing: acute  Location: chest  Quality tightness  Severity: moderate to severe  Modifying Factors: home meds have not helped  Associated Symptoms: cough, fever, chills, SOB       Additional History (Context): Priya Reinoso is a 60 y.o. female with PMH DM, COPD, asthma, htn, and hyperlipidemia who returns to the ED after being seen last night with c/o continued cough, SOB, fever, and chills x a few days. Pt concerned about COVID-19 as her granddaughter who lives with her recently tested positive. Pt was given 2 breathing treatments in the ED last night which improved her sx. Pt states she has been using her home meds today with no relief in sx. She also reports wearing 2 L O2 by nasal canula at all times. Denies chest pain, abd complaints, and urinary sx. No other complaints reported at this time. PCP: Ty Washington MD    Current Facility-Administered Medications   Medication Dose Route Frequency Provider Last Rate Last Dose    sodium chloride (NS) flush 5-10 mL  5-10 mL IntraVENous PRN Evonne Kate PA-C         Current Outpatient Medications   Medication Sig Dispense Refill    fluticasone propionate (Flovent HFA) 220 mcg/actuation inhaler Take 1 Puff by inhalation two (2) times a day. 1 Inhaler 0    albuterol-ipratropium (DUO-NEB) 2.5 mg-0.5 mg/3 ml nebu 3 mL by Nebulization route every four (4) hours as needed for Wheezing. 30 Nebule 0    albuterol (PROVENTIL HFA, VENTOLIN HFA, PROAIR HFA) 90 mcg/actuation inhaler Take 2 Puffs by inhalation every four (4) hours as needed for Wheezing.  1 Inhaler 0    benzonatate (Tessalon Perles) 100 mg capsule Take 1 Cap by mouth three (3) times daily as needed for Cough for up to 7 days. 30 Cap 0    dexAMETHasone (Decadron) 6 mg tablet Take one tablet PO daily 5 Tab 0    predniSONE (DELTASONE) 5 mg tablet Take 1 Tab by mouth daily. 60 Tab 1    azithromycin (ZITHROMAX) 250 mg tablet Take 1 Tab by mouth every Monday, Wednesday, Friday. Monday-Friday. 30 Tab 0    omeprazole (PRILOSEC) 40 mg capsule Take  by mouth.  insulin glargine (Basaglar KwikPen U-100 Insulin) 100 unit/mL (3 mL) inpn 45 Units by SubCUTAneous route nightly.  insulin glargine (LANTUS,BASAGLAR) 100 unit/mL (3 mL) inpn 40 Units by SubCUTAneous route nightly. Please inject 40 units every bedtime. Indications: type 2 diabetes mellitus 28 Units 0    pantoprazole (PROTONIX) 40 mg tablet Take 1 Tab by mouth daily. 30 Tab 0    predniSONE (DELTASONE) 20 mg tablet Please take 60mg Prednisone(3 tabs) for 7 days, 50mg (2 1/2 tabs) for 7 days, 40mg(2 tabs) for 7 days, 30mg (1 1/2 tabs) for 7 days, 20mg (1 tabs) for 7 days, 10mg (1/2 tab) for 7 days, then take 5 mg daily from then on wards. 80 Tab 0    furosemide (Lasix) 20 mg tablet Take 20 mg by mouth daily.  lactobacillus sp. 50 billion cpu (BIO-K PLUS) 50 billion cell -375 mg cap capsule Take 1 Cap by mouth daily. 30 Cap 0    simethicone (GAS-X) 125 mg capsule Take 125 mg by mouth four (4) times daily as needed for Flatulence.  loratadine (CLARITIN) 10 mg tablet Take 10 mg by mouth daily as needed for Allergies.  lisinopril (PRINIVIL, ZESTRIL) 20 mg tablet Take 20 mg by mouth two (2) times a day.  fluticasone propionate (FLONASE ALLERGY RELIEF) 50 mcg/actuation nasal spray 2 Sprays by Both Nostrils route daily.  fluticasone propion-salmeterol (ADVAIR HFA) 230-21 mcg/actuation inhaler Take 2 Puffs by inhalation two (2) times a day.  hydroCHLOROthiazide (HYDRODIURIL) 12.5 mg tablet Take 12.5 mg by mouth daily.       tiotropium bromide (SPIRIVA RESPIMAT) 2.5 mcg/actuation inhaler Take 2 Puffs by inhalation daily.  NOVOLOG FLEXPEN U-100 INSULIN 100 unit/mL inpn Continue home Sliding scale insulin as prior to admission (Patient taking differently: 1 Units by SubCUTAneous route three (3) times daily. If BG <100=0u  101-150=5u  151-250=8u  251-300=12u  >300 call MD  ) 1 Pen 0    OXYGEN-AIR DELIVERY SYSTEMS 2 L by Nasal route continuous. [facility] Choice      aspirin delayed-release 81 mg tablet Take 81 mg by mouth daily.  montelukast (SINGULAIR) 10 mg tablet Take 10 mg by mouth nightly.          Past History     Past Medical History:  Past Medical History:   Diagnosis Date    Asthma     Chronic lung disease     COPD     Cystocele, midline     Diabetes mellitus ([address]     GERD (gastroesophageal reflux disease)     Hidradenitis suppurativa     Hyperlipidemia     Hypertension     SHERRIE on CPAP     CPAP    Stress incontinence        Past Surgical History:  Past Surgical History:   Procedure Laterality Date    BREAST SURGERY PROCEDURE UNLISTED      Right breast benign tumor removal    HX APPENDECTOMY      HX CHOLECYSTECTOMY      HX DILATION AND CURETTAGE      Dysfunctional uterine bleeding, thought 2/2 fibroids    HX TUBAL LIGATION         Family History:  Family History   Problem Relation Age of Onset    Hypertension Mother     Stroke Mother     Breast Cancer Mother         Bilateral mastectomies    Cancer Mother         ovarian and breast    Heart Failure Mother     Heart Attack Father         2011    Heart Surgery Father         CABG    Heart Failure Father     COPD Sister         Heavy smoker    Cancer Sister         ovarian    Heart Failure Sister     Lung Disease Sister     Asthma Child     Cancer Maternal Aunt         pancreatic    Cancer Maternal Grandfather         stomach       Social History:  Social History     Tobacco Use    Smoking status: Former Smoker     Packs/day: 1.00     Years: 30.00     Pack years: 30.00     Types: Cigarettes     Start date: 1966     Last attempt to quit: 2006     Years since quittin.8    Smokeless tobacco: Former User    Tobacco comment: 1-1.5 packs per day   Substance Use Topics    Alcohol use: No    Drug use: No       Allergies: Allergies   Allergen Reactions    Ancef [Cefazolin] Hives    Contrast Agent [Iodine] Anaphylaxis, Shortness of Breath and Swelling     Needs pre-medication for IV contrast with Benadryl, Solu-Medrol    Fish Containing Products Anaphylaxis     Pt states she had a severe allergic reaction at 9 y/o.  Statins-Hmg-Coa Reductase Inhibitors Myalgia    Metformin Other (comments)     Abdominal pain, diarrhea.  Codeine Other (comments)     Altered mental status         Review of Systems   Review of Systems   Constitutional: Positive for chills and fever. HENT: Negative. Negative for congestion, ear pain and rhinorrhea. Eyes: Negative. Negative for pain and redness. Respiratory: Positive for cough and shortness of breath. Negative for wheezing and stridor. Cardiovascular: Negative. Negative for chest pain and leg swelling. Gastrointestinal: Negative. Negative for abdominal pain, constipation, diarrhea, nausea and vomiting. Genitourinary: Negative. Negative for dysuria and frequency. Musculoskeletal: Negative. Negative for back pain and neck pain. Skin: Negative. Negative for rash and wound. Neurological: Negative. Negative for dizziness, seizures, syncope and headaches. All other systems reviewed and are negative. All Other Systems Negative  Physical Exam     Vitals:    20 1845 20 1900 20 2035   BP: 147/65 143/52 127/77 126/56   Pulse: 92 92 83    Resp: (!) 35 29 25    Temp:    98.2 °F (36.8 °C)   SpO2: 96% 97% 97%      Physical Exam  Vitals signs and nursing note reviewed. Constitutional:       General: She is in acute distress. Appearance: She is well-developed. She is obese.  She is not diaphoretic. Comments: Moderately distressed   HENT:      Head: Normocephalic and atraumatic. Eyes:      General: No scleral icterus. Right eye: No discharge. Left eye: No discharge. Conjunctiva/sclera: Conjunctivae normal.   Neck:      Musculoskeletal: Normal range of motion and neck supple. Cardiovascular:      Rate and Rhythm: Normal rate and regular rhythm. Heart sounds: Normal heart sounds. No murmur. No friction rub. No gallop. Pulmonary:      Breath sounds: No stridor. No wheezing, rhonchi or rales. Comments: Labored breathing, tachypnea noted. Diminished lung sounds bilaterally. Musculoskeletal: Normal range of motion. Skin:     General: Skin is warm and dry. Findings: No erythema or rash. Neurological:      Mental Status: She is alert and oriented to person, place, and time. Coordination: Coordination normal.      Comments: Gait is steady and patient exhibits no evidence of ataxia. Patient is able to ambulate without difficulty. No focal neurological deficit noted. No facial droop, slurred speech, or evidence of altered mentation noted on exam.     Psychiatric:         Behavior: Behavior normal.         Thought Content:  Thought content normal.                Diagnostic Study Results     Labs -     Recent Results (from the past 12 hour(s))   EKG, 12 LEAD, INITIAL    Collection Time: 07/07/20  4:09 PM   Result Value Ref Range    Ventricular Rate 99 BPM    Atrial Rate 99 BPM    P-R Interval 132 ms    QRS Duration 80 ms    Q-T Interval 332 ms    QTC Calculation (Bezet) 426 ms    Calculated P Axis 58 degrees    Calculated R Axis -7 degrees    Calculated T Axis 47 degrees    Diagnosis       Normal sinus rhythm  Normal ECG  When compared with ECG of 06-JUL-2020 21:02,  No significant change was found     CARDIAC PANEL,(CK, CKMB & TROPONIN)    Collection Time: 07/07/20  4:15 PM   Result Value Ref Range    CK - MB <1.0 <3.6 ng/ml    CK-MB Index  0.0 - 4.0 % CALCULATION NOT PERFORMED WHEN RESULT IS BELOW LINEAR LIMIT    CK 48 26 - 192 U/L    Troponin-I, QT <0.02 0.0 - 0.045 NG/ML   CBC WITH AUTOMATED DIFF    Collection Time: 07/07/20  4:15 PM   Result Value Ref Range    WBC 6.5 4.6 - 13.2 K/uL    RBC 3.94 (L) 4.20 - 5.30 M/uL    HGB 12.2 12.0 - 16.0 g/dL    HCT 35.9 35.0 - 45.0 %    MCV 91.1 74.0 - 97.0 FL    MCH 31.0 24.0 - 34.0 PG    MCHC 34.0 31.0 - 37.0 g/dL    RDW 14.0 11.6 - 14.5 %    PLATELET 029 465 - 589 K/uL    MPV 9.0 (L) 9.2 - 11.8 FL    NEUTROPHILS 72 40 - 73 %    LYMPHOCYTES 17 (L) 21 - 52 %    MONOCYTES 11 (H) 3 - 10 %    EOSINOPHILS 0 0 - 5 %    BASOPHILS 0 0 - 2 %    ABS. NEUTROPHILS 4.7 1.8 - 8.0 K/UL    ABS. LYMPHOCYTES 1.1 0.9 - 3.6 K/UL    ABS. MONOCYTES 0.7 0.05 - 1.2 K/UL    ABS. EOSINOPHILS 0.0 0.0 - 0.4 K/UL    ABS. BASOPHILS 0.0 0.0 - 0.1 K/UL    DF AUTOMATED     METABOLIC PANEL, COMPREHENSIVE    Collection Time: 07/07/20  4:15 PM   Result Value Ref Range    Sodium 136 136 - 145 mmol/L    Potassium 3.7 3.5 - 5.5 mmol/L    Chloride 102 100 - 111 mmol/L    CO2 28 21 - 32 mmol/L    Anion gap 6 3.0 - 18 mmol/L    Glucose 179 (H) 74 - 99 mg/dL    BUN 9 7.0 - 18 MG/DL    Creatinine 0.91 0.6 - 1.3 MG/DL    BUN/Creatinine ratio 10 (L) 12 - 20      GFR est AA >60 >60 ml/min/1.73m2    GFR est non-AA >60 >60 ml/min/1.73m2    Calcium 9.4 8.5 - 10.1 MG/DL    Bilirubin, total 0.8 0.2 - 1.0 MG/DL    ALT (SGPT) 63 (H) 13 - 56 U/L    AST (SGOT) 34 10 - 38 U/L    Alk.  phosphatase 76 45 - 117 U/L    Protein, total 7.3 6.4 - 8.2 g/dL    Albumin 3.4 3.4 - 5.0 g/dL    Globulin 3.9 2.0 - 4.0 g/dL    A-G Ratio 0.9 0.8 - 1.7     NT-PRO BNP    Collection Time: 07/07/20  4:15 PM   Result Value Ref Range    NT pro- 0 - 900 PG/ML   POC LACTIC ACID    Collection Time: 07/07/20  6:00 PM   Result Value Ref Range    Lactic Acid (POC) 1.30 0.40 - 2.00 mmol/L   URINALYSIS W/ RFLX MICROSCOPIC    Collection Time: 07/07/20  8:30 PM   Result Value Ref Range    Color YELLOW      Appearance CLEAR      Specific gravity 1.024 1.005 - 1.030      pH (UA) 5.5 5.0 - 8.0      Protein TRACE (A) NEG mg/dL    Glucose 500 (A) NEG mg/dL    Ketone TRACE (A) NEG mg/dL    Bilirubin Negative NEG      Blood Negative NEG      Urobilinogen 1.0 0.2 - 1.0 EU/dL    Nitrites Negative NEG      Leukocyte Esterase TRACE (A) NEG     URINE MICROSCOPIC ONLY    Collection Time: 07/07/20  8:30 PM   Result Value Ref Range    WBC 0 to 3 0 - 5 /hpf    Epithelial cells 2+ 0 - 5 /lpf    Bacteria 1+ (A) NEG /hpf    Mucus FEW (A) NEG /lpf       Radiologic Studies -   XR CHEST PORT    (Results Pending)     CT Results  (Last 48 hours)    None        CXR Results  (Last 48 hours)               07/06/20 2142  XR CHEST PORT Final result    Impression:  IMPRESSION: Probable chronic right middle lobe atelectasis. When clinically   able, recommend PA and lateral chest film. Narrative:  XR CHEST PORT       TIME: 2124 hours       HISTORY: COPD exacerbation. .SOB       LIMITATIONS: Body habitus and rotated to the left. Oxygen mask overlies the left   lung apex. COMPARISON: 3 Alondra 2020, 21 May 2020, 9 April 2020 Portable Chest. Abdomen   Pelvic CT 7/14/2019 and 8 April 2020. FINDINGS: The lungs are well inflated. There is no left-sided focal opacity,   pleural effusion, pleural thickening, or interstitial edema. Again noted is patchy density over the right heart border just above the   hemidiaphragm. Moderate pericardial fat deposition. The cardiomediastinum, hilum, airway, upper   abdomen, and selma thorax are all unremarkable. No visible lines or tubes. .                   Medical Decision Making   I am the first provider for this patient. I reviewed the vital signs, available nursing notes, past medical history, past surgical history, family history and social history. Vital Signs-Reviewed the patient's vital signs.       Records Reviewed: Evonne Kate PA-C Procedures:  Procedures    Provider Notes (Medical Decision Making): Impression:  Fever, SOB, cough    IV inserted, 1 L NS given, duo neb solumedrol and mag sulfate given   Tylenol given PO  Pt placed on 5 L O2 by nasal canula, O2 ranging b/w 92-99%    Sepsis bundle initiated. Labs show a normal WBC and lactic acid  Essentially unremarkable. Troponin negative  EKG: no acute changes or STEMI, reviewed by myself and the ED attending  UA not convincing of UTI, sent for culture  Chest x-ray negative for acute process    Progress: pt sx improved, re-examined by bedside. States her sx improved. She has been decreased to 2 L O2, O2 sat around 95-98%. Discussed the option for calling the hospitalist on call for possible admission given this is her second ED visit for this complaint. Pt declined admission. She is also tapering down on prednisone and is currently on 5 mg daily. Will plan to r/f her meds and recommended close pcp follow-up. Low threshold recommended for returning to the ED for any worsening sx. She is also recommended to increased her O2 up to 5 L but return to the ED immediately should she still be symptomatic with this increase in O2. Pt agrees with this plan. Evonne Kate PA-C       MED RECONCILIATION:  Current Facility-Administered Medications   Medication Dose Route Frequency    sodium chloride (NS) flush 5-10 mL  5-10 mL IntraVENous PRN     Current Outpatient Medications   Medication Sig    fluticasone propionate (Flovent HFA) 220 mcg/actuation inhaler Take 1 Puff by inhalation two (2) times a day.  albuterol-ipratropium (DUO-NEB) 2.5 mg-0.5 mg/3 ml nebu 3 mL by Nebulization route every four (4) hours as needed for Wheezing.  albuterol (PROVENTIL HFA, VENTOLIN HFA, PROAIR HFA) 90 mcg/actuation inhaler Take 2 Puffs by inhalation every four (4) hours as needed for Wheezing.     benzonatate (Tessalon Perles) 100 mg capsule Take 1 Cap by mouth three (3) times daily as needed for Cough for up to 7 days.  dexAMETHasone (Decadron) 6 mg tablet Take one tablet PO daily    predniSONE (DELTASONE) 5 mg tablet Take 1 Tab by mouth daily.  azithromycin (ZITHROMAX) 250 mg tablet Take 1 Tab by mouth every Monday, Wednesday, Friday. Monday-Friday.  omeprazole (PRILOSEC) 40 mg capsule Take  by mouth.  insulin glargine (Basaglar KwikPen U-100 Insulin) 100 unit/mL (3 mL) inpn 45 Units by SubCUTAneous route nightly.  insulin glargine (LANTUS,BASAGLAR) 100 unit/mL (3 mL) inpn 40 Units by SubCUTAneous route nightly. Please inject 40 units every bedtime. Indications: type 2 diabetes mellitus    pantoprazole (PROTONIX) 40 mg tablet Take 1 Tab by mouth daily.  predniSONE (DELTASONE) 20 mg tablet Please take 60mg Prednisone(3 tabs) for 7 days, 50mg (2 1/2 tabs) for 7 days, 40mg(2 tabs) for 7 days, 30mg (1 1/2 tabs) for 7 days, 20mg (1 tabs) for 7 days, 10mg (1/2 tab) for 7 days, then take 5 mg daily from then on wards.  furosemide (Lasix) 20 mg tablet Take 20 mg by mouth daily.  lactobacillus sp. 50 billion cpu (BIO-K PLUS) 50 billion cell -375 mg cap capsule Take 1 Cap by mouth daily.  simethicone (GAS-X) 125 mg capsule Take 125 mg by mouth four (4) times daily as needed for Flatulence.  loratadine (CLARITIN) 10 mg tablet Take 10 mg by mouth daily as needed for Allergies.  lisinopril (PRINIVIL, ZESTRIL) 20 mg tablet Take 20 mg by mouth two (2) times a day.  fluticasone propionate (FLONASE ALLERGY RELIEF) 50 mcg/actuation nasal spray 2 Sprays by Both Nostrils route daily.  fluticasone propion-salmeterol (ADVAIR HFA) 230-21 mcg/actuation inhaler Take 2 Puffs by inhalation two (2) times a day.  hydroCHLOROthiazide (HYDRODIURIL) 12.5 mg tablet Take 12.5 mg by mouth daily.  tiotropium bromide (SPIRIVA RESPIMAT) 2.5 mcg/actuation inhaler Take 2 Puffs by inhalation daily.     NOVOLOG FLEXPEN U-100 INSULIN 100 unit/mL inpn Continue home Sliding scale insulin as prior to admission (Patient taking differently: 1 Units by SubCUTAneous route three (3) times daily. If BG <100=0u  101-150=5u  151-250=8u  251-300=12u  >300 call MD  )    OXYGEN-AIR DELIVERY SYSTEMS 2 L by Nasal route continuous. [employer]    aspirin delayed-release 81 mg tablet Take 81 mg by mouth daily.  montelukast (SINGULAIR) 10 mg tablet Take 10 mg by mouth nightly. Disposition:  [place] d/c    DISCHARGE NOTE:   Patient is stable for discharge at this time. I have discussed all the findings from today's work up with the patient, including lab results and imaging. I have answered all questions. Rx for tessalon and albuterol refills given. Rest and close follow-up with the PCP recommended this week. Return to the ED immediately for any new or worsening symptoms. April Galindo Kaiser PA-C     Follow-up Information     Follow up With Specialties Details Why Rodrick Timmons MD [facility] In 1 week  [facility] [address] [id number]  [phone]      [facility] EMERGENCY DEPT Emergency Medicine  As needed, If symptoms worsen [address] [facility] [address]  [phone]          Current Discharge Medication List      START taking these medications    Details   albuterol (PROVENTIL HFA, VENTOLIN HFA, PROAIR HFA) 90 mcg/actuation inhaler Take 2 Puffs by inhalation every four (4) hours as needed for Wheezing. Qty: 1 Inhaler, Refills: 0      benzonatate (Tessalon Perles) 100 mg capsule Take 1 Cap by mouth three (3) times daily as needed for Cough for up to 7 days. Qty: 30 Cap, Refills: 0         CONTINUE these medications which have CHANGED    Details   fluticasone propionate (Flovent HFA) 220 mcg/actuation inhaler Take 1 Puff by inhalation two (2) times a day. Qty: 1 Inhaler, Refills: 0      albuterol-ipratropium (DUO-NEB) 2.5 mg-0.5 mg/3 ml nebu 3 mL by Nebulization route every four (4) hours as needed for Wheezing.   Qty: 30 Nebule, Refills: 0         CONTINUE these medications which have NOT CHANGED    Details   fluticasone propionate (FLONASE ALLERGY RELIEF) 50 mcg/actuation nasal spray 2 Sprays by Both Nostrils route daily. STOP taking these medications       albuterol sulfate (PROVENTIL;VENTOLIN) 2.5 mg/0.5 mL nebu nebulizer solution Comments:   Reason for Stopping:         albuterol sulfate 90 mcg/actuation aepb Comments:   Reason for Stopping:                 Core Measures:    Critical Care Time:   Critical Care Time:   I have spent 35 minutes of critical care time involved in lab review, consultations with specialist, family decision-making, and documentation. During this entire length of time I was immediately available to the patient.     Critical Care: The reason for providing this level of medical care for this critically ill patient was due a critical illness that impaired one or more vital organ systems such that there was a high probability of imminent or life threatening deterioration in the patients condition. This care involved high complexity decision making to assess, manipulate, and support vital system functions, to treat this degreee vital organ system failure and to prevent further life threatening deterioration of the patients condition. Diagnosis     Clinical Impression:   1. COPD exacerbation ([facility] [address]    2. Suspected COVID-19 virus infection    3.  SOB (shortness of breath)

## 2020-07-07 NOTE — ED PROVIDER NOTES
EMERGENCY DEPARTMENT HISTORY AND PHYSICAL EXAM    8:50 PM      Date: 7/6/2020  Patient Name: Marbella Duque    History of Presenting Illness     Chief Complaint   Patient presents with    Shortness of Breath         History Provided By: Patient    Additional History (Context): Marbella Duque is a 60 y.o. female with diabetes, hypertension, hyperlipidemia, COPD and asthma who presents with shortness of breath. Patient states that she has been coughing and has had an headache this morning. She has had    PCP: Maya Juarez MD        Current Outpatient Medications   Medication Sig Dispense Refill    dexAMETHasone (Decadron) 6 mg tablet Take one tablet PO daily 5 Tab 0    predniSONE (DELTASONE) 5 mg tablet Take 1 Tab by mouth daily. 60 Tab 1    azithromycin (ZITHROMAX) 250 mg tablet Take 1 Tab by mouth every Monday, Wednesday, Friday. Monday-Friday. 30 Tab 0    albuterol-ipratropium (DUO-NEB) 2.5 mg-0.5 mg/3 ml nebu USE 1 VIAL IN NEBULIZER EVERY 4 HOURS AS NEEDED      omeprazole (PRILOSEC) 40 mg capsule Take  by mouth.  insulin glargine (Basaglar KwikPen U-100 Insulin) 100 unit/mL (3 mL) inpn 45 Units by SubCUTAneous route nightly.  insulin glargine (LANTUS,BASAGLAR) 100 unit/mL (3 mL) inpn 40 Units by SubCUTAneous route nightly. Please inject 40 units every bedtime. Indications: type 2 diabetes mellitus 28 Units 0    fluticasone propionate (Flovent HFA) 220 mcg/actuation inhaler Take 1 Puff by inhalation two (2) times a day. 1 Inhaler 0    pantoprazole (PROTONIX) 40 mg tablet Take 1 Tab by mouth daily. 30 Tab 0    predniSONE (DELTASONE) 20 mg tablet Please take 60mg Prednisone(3 tabs) for 7 days, 50mg (2 1/2 tabs) for 7 days, 40mg(2 tabs) for 7 days, 30mg (1 1/2 tabs) for 7 days, 20mg (1 tabs) for 7 days, 10mg (1/2 tab) for 7 days, then take 5 mg daily from then on wards. 80 Tab 0    furosemide (Lasix) 20 mg tablet Take 20 mg by mouth daily.       lactobacillus sp. 50 billion cpu (BIO-K PLUS) 50 billion cell -375 mg cap capsule Take 1 Cap by mouth daily. 30 Cap 0    simethicone (GAS-X) 125 mg capsule Take 125 mg by mouth four (4) times daily as needed for Flatulence.  loratadine (CLARITIN) 10 mg tablet Take 10 mg by mouth daily as needed for Allergies.  lisinopril (PRINIVIL, ZESTRIL) 20 mg tablet Take 20 mg by mouth two (2) times a day.  albuterol sulfate (PROVENTIL;VENTOLIN) 2.5 mg/0.5 mL nebu nebulizer solution 0.5 mL by Nebulization route four (4) times daily as needed for Wheezing. To be used with HyperSal nebulizer solution. ICD code: COPD J44.1 60 mL 3    fluticasone propionate (FLONASE ALLERGY RELIEF) 50 mcg/actuation nasal spray 2 Sprays by Both Nostrils route daily.  fluticasone propion-salmeterol (ADVAIR HFA) 230-21 mcg/actuation inhaler Take 2 Puffs by inhalation two (2) times a day.  hydroCHLOROthiazide (HYDRODIURIL) 12.5 mg tablet Take 12.5 mg by mouth daily.  tiotropium bromide (SPIRIVA RESPIMAT) 2.5 mcg/actuation inhaler Take 2 Puffs by inhalation daily.  albuterol sulfate 90 mcg/actuation aepb Take 1 Puff by inhalation every four (4) hours. (Patient taking differently: Take 1 Puff by inhalation every four (4) hours as needed.) 1 Inhaler 0    NOVOLOG FLEXPEN U-100 INSULIN 100 unit/mL inpn Continue home Sliding scale insulin as prior to admission (Patient taking differently: 1 Units by SubCUTAneous route three (3) times daily. If BG <100=0u  101-150=5u  151-250=8u  251-300=12u  >300 call MD  ) 1 Pen 0    OXYGEN-AIR DELIVERY SYSTEMS 2 L by Nasal route continuous. [facility]      aspirin delayed-release 81 mg tablet Take 81 mg by mouth daily.  montelukast (SINGULAIR) 10 mg tablet Take 10 mg by mouth nightly.          Past History     Past Medical History:  Past Medical History:   Diagnosis Date    Asthma     Chronic lung disease     COPD     Cystocele, midline     Diabetes mellitus ([address]     GERD (gastroesophageal reflux disease)     Hidradenitis suppurativa     Hyperlipidemia     Hypertension     SHERRIE on CPAP     CPAP    Stress incontinence        Past Surgical History:  Past Surgical History:   Procedure Laterality Date    BREAST SURGERY PROCEDURE UNLISTED      Right breast benign tumor removal    HX APPENDECTOMY      HX CHOLECYSTECTOMY      HX DILATION AND CURETTAGE      Dysfunctional uterine bleeding, thought 2/2 fibroids    HX TUBAL LIGATION         Family History:  Family History   Problem Relation Age of Onset    Hypertension Mother     Stroke Mother     Breast Cancer Mother         Bilateral mastectomies    Cancer Mother         ovarian and breast    Heart Failure Mother     Heart Attack Father         2011    Heart Surgery Father         CABG    Heart Failure Father     COPD Sister         Heavy smoker    Cancer Sister         ovarian    Heart Failure Sister     Lung Disease Sister     Asthma Child     Cancer Maternal Aunt         pancreatic    Cancer Maternal Grandfather         stomach       Social History:  Social History     Tobacco Use    Smoking status: Former Smoker     Packs/day: 1.00     Years: 30.00     Pack years: 30.00     Types: Cigarettes     Start date: 1966     Last attempt to quit: 2006     Years since quittin.8    Smokeless tobacco: Former User    Tobacco comment: 1-1.5 packs per day   Substance Use Topics    Alcohol use: No    Drug use: No       Allergies: Allergies   Allergen Reactions    Ancef [Cefazolin] Hives    Contrast Agent [Iodine] Anaphylaxis, Shortness of Breath and Swelling     Needs pre-medication for IV contrast with Benadryl, Solu-Medrol    Fish Containing Products Anaphylaxis     Pt states she had a severe allergic reaction at 9 y/o.  Statins-Hmg-Coa Reductase Inhibitors Myalgia    Metformin Other (comments)     Abdominal pain, diarrhea.     Codeine Other (comments)     Altered mental status         Review of Systems       Review of Systems   Constitutional: Negative. Negative for chills, diaphoresis and fever. HENT: Negative. Negative for congestion, rhinorrhea and sore throat. Eyes: Negative. Negative for pain, discharge and redness. Respiratory: Positive for cough and shortness of breath. Negative for chest tightness and wheezing. Cardiovascular: Negative. Negative for chest pain. Gastrointestinal: Negative. Negative for abdominal pain, constipation, diarrhea, nausea and vomiting. Genitourinary: Negative. Negative for dysuria, flank pain, frequency, hematuria and urgency. Musculoskeletal: Negative. Negative for back pain and neck pain. Skin: Negative. Negative for rash. Neurological: Negative. Negative for syncope, weakness, numbness and headaches. Psychiatric/Behavioral: Negative. All other systems reviewed and are negative. Physical Exam     Visit Vitals  /70   Pulse 96   Temp 98.8 °F (37.1 °C)   Resp 27   SpO2 95%         Physical Exam  Vitals signs and nursing note reviewed. Constitutional:       General: She is not in acute distress. Appearance: Normal appearance. She is well-developed. She is not ill-appearing, toxic-appearing or diaphoretic. HENT:      Head: Normocephalic and atraumatic. Mouth/Throat:      Pharynx: No oropharyngeal exudate. Eyes:      General: No scleral icterus. Conjunctiva/sclera: Conjunctivae normal.      Pupils: Pupils are equal, round, and reactive to light. Neck:      Musculoskeletal: Normal range of motion and neck supple. Thyroid: No thyromegaly. Vascular: No hepatojugular reflux or JVD. Trachea: No tracheal deviation. Cardiovascular:      Rate and Rhythm: Normal rate and regular rhythm. Pulses: Normal pulses. Radial pulses are 2+ on the right side and 2+ on the left side. Dorsalis pedis pulses are 2+ on the right side and 2+ on the left side.       Heart sounds: Normal heart sounds, S1 normal and S2 normal. No murmur. No gallop. No S3 or S4 sounds. Pulmonary:      Effort: Pulmonary effort is normal. No respiratory distress or retractions. Breath sounds: Decreased air movement present. No decreased breath sounds, wheezing, rhonchi or rales. Abdominal:      General: Bowel sounds are normal. There is no distension. Palpations: Abdomen is soft. Abdomen is not rigid. There is no mass. Tenderness: There is no abdominal tenderness. There is no guarding or rebound. Negative signs include Duarte's sign and McBurney's sign. Musculoskeletal: Normal range of motion. Lymphadenopathy:      Head:      Right side of head: No submental, submandibular, preauricular or occipital adenopathy. Left side of head: No submental, submandibular, preauricular or occipital adenopathy. Cervical: No cervical adenopathy. Upper Body:      Right upper body: No supraclavicular adenopathy. Left upper body: No supraclavicular adenopathy. Skin:     General: Skin is warm and dry. Findings: No rash. Neurological:      Mental Status: She is alert. She is not disoriented. GCS: GCS eye subscore is 4. GCS verbal subscore is 5. GCS motor subscore is 6. Cranial Nerves: No cranial nerve deficit. Sensory: No sensory deficit. Coordination: Coordination normal.      Gait: Gait normal.      Deep Tendon Reflexes: Reflexes are normal and symmetric. Psychiatric:         Speech: Speech normal.         Behavior: Behavior normal.         Thought Content:  Thought content normal.         Judgment: Judgment normal.           Diagnostic Study Results     Labs -  Recent Results (from the past 12 hour(s))   EKG, 12 LEAD, INITIAL    Collection Time: 07/06/20  9:02 PM   Result Value Ref Range    Ventricular Rate 95 BPM    Atrial Rate 95 BPM    P-R Interval 144 ms    QRS Duration 90 ms    Q-T Interval 356 ms    QTC Calculation (Bezet) 447 ms    Calculated P Axis 46 degrees    Calculated R Axis 31 degrees    Calculated T Axis 54 degrees    Diagnosis       Normal sinus rhythm  Cannot rule out Anterior infarct , age undetermined  Abnormal ECG  When compared with ECG of 03-JUN-2020 23:01,  No significant change was found     CBC WITH AUTOMATED DIFF    Collection Time: 07/06/20  9:21 PM   Result Value Ref Range    WBC 6.7 4.6 - 13.2 K/uL    RBC 4.28 4.20 - 5.30 M/uL    HGB 13.1 12.0 - 16.0 g/dL    HCT 39.0 35.0 - 45.0 %    MCV 91.1 74.0 - 97.0 FL    MCH 30.6 24.0 - 34.0 PG    MCHC 33.6 31.0 - 37.0 g/dL    RDW 14.0 11.6 - 14.5 %    PLATELET 703 804 - 233 K/uL    MPV 9.1 (L) 9.2 - 11.8 FL    NEUTROPHILS 78 (H) 40 - 73 %    LYMPHOCYTES 13 (L) 21 - 52 %    MONOCYTES 9 3 - 10 %    EOSINOPHILS 0 0 - 5 %    BASOPHILS 0 0 - 2 %    ABS. NEUTROPHILS 5.2 1.8 - 8.0 K/UL    ABS. LYMPHOCYTES 0.9 0.9 - 3.6 K/UL    ABS. MONOCYTES 0.6 0.05 - 1.2 K/UL    ABS. EOSINOPHILS 0.0 0.0 - 0.4 K/UL    ABS. BASOPHILS 0.0 0.0 - 0.1 K/UL    DF AUTOMATED     METABOLIC PANEL, COMPREHENSIVE    Collection Time: 07/06/20  9:21 PM   Result Value Ref Range    Sodium 137 136 - 145 mmol/L    Potassium 3.7 3.5 - 5.5 mmol/L    Chloride 103 100 - 111 mmol/L    CO2 29 21 - 32 mmol/L    Anion gap 5 3.0 - 18 mmol/L    Glucose 186 (H) 74 - 99 mg/dL    BUN 12 7.0 - 18 MG/DL    Creatinine 0.94 0.6 - 1.3 MG/DL    BUN/Creatinine ratio 13 12 - 20      GFR est AA >60 >60 ml/min/1.73m2    GFR est non-AA >60 >60 ml/min/1.73m2    Calcium 9.8 8.5 - 10.1 MG/DL    Bilirubin, total 0.4 0.2 - 1.0 MG/DL    ALT (SGPT) 60 (H) 13 - 56 U/L    AST (SGOT) 27 10 - 38 U/L    Alk.  phosphatase 77 45 - 117 U/L    Protein, total 7.3 6.4 - 8.2 g/dL    Albumin 3.8 3.4 - 5.0 g/dL    Globulin 3.5 2.0 - 4.0 g/dL    A-G Ratio 1.1 0.8 - 1.7     CARDIAC PANEL,(CK, CKMB & TROPONIN)    Collection Time: 07/06/20  9:21 PM   Result Value Ref Range    CK - MB <1.0 <3.6 ng/ml    CK-MB Index  0.0 - 4.0 %     CALCULATION NOT PERFORMED WHEN RESULT IS BELOW LINEAR LIMIT    CK 67 26 - 192 U/L Troponin-I, QT <0.02 0.0 - 0.045 NG/ML       Radiologic Studies -   XR CHEST PORT    (Results Pending)         Medical Decision Making   Provider Notes (Medical Decision Making):  MDM  Number of Diagnoses or Management Options  COPD exacerbation ([address]):   Suspected COVID-19 virus infection:       I am the first provider for this patient. I reviewed the vital signs, available nursing notes, past medical history, past surgical history, family history and social history. Vital Signs-Reviewed the patient's vital signs. Records Reviewed: Nursing Notes (Time of Review: 8:50 PM)    ED Course: Progress Notes, Reevaluation, and Consults:    Labs essentially normal.  Chest X-Ray showed No acute process. EKG showed NSR with rate of 95 bpm. With no ST elevations or depression and non specific T wave changes. 10:50 PM 7/6/2020        Diagnosis       I have reassessed the patient. Patient is feeling better after breathing treatment x2. This time I do not feel a reason to keep this patient in the hospital.  Patient agrees. Will test for COVID and results to patient. Patient was discharged in stable condition. Patient is to return to emergency department if any new or worsening condition. Clinical Impression:   1. COPD exacerbation ([address]    2. Suspected COVID-19 virus infection        Disposition: Discharged home     Follow-up Information     Follow up With Specialties Details Why Contact Omar Pastor MD Family Practice In 2 days  [address] [facility]  [phone]               Attestation        Provider Attestation:     I personally performed the services described in the documentation, reviewed the documentation and it accurately and completely records my words and actions utilizing the [address] [place] July 06, 2020 at 10:42 PM - Madhu Jauregui DO    Disclaimer. It is dictated using utilizing voice recognition software.   Unfortunately this leads to occasional typographical errors. I apologize in advance if the situation occurs. If questions arise please do not hesitate to contact me or call our department.

## 2020-07-07 NOTE — PROGRESS NOTES
Spoke with patient's daughter. She stated patient was not feel;ing that much better and was considering return to the ER. Will follow up as necessary with patient. COVID results negative 20. Repeat test done 20, result not available at time of this note. Patient contacted regarding COVID-19  risk. Care Transition Nurse/ Ambulatory Care Manager contacted the family by telephone to perform post discharge assessment. Verified name and  with family as identifiers. Provided introduction to self, and explanation of the CTN/ACM role, and reason for call due to risk factors for infection and/or exposure to COVID-19. Symptoms reviewed with family who verbalized the following symptoms: cough and shortness of breath. Due to worsening symptoms encounter was routed to provider for escalation. Patient has following risk factors of: COPD, asthma and diabetes. CTN/ACM reviewed discharge instructions, medical action plan and red flags such as increased shortness of breath, increasing fever and signs of decompensation with family who verbalized understanding. Discussed exposure protocols and quarantine with CDC Guidelines What to do if you are sick with coronavirus disease .  Family was given an opportunity for questions and concerns. The family agrees to contact the [facility] exposure line [phone], [facility] department [facility]  ([phone]) and PCP office for questions related to their healthcare. CTN/ACM provided contact information for future needs. Reviewed and educated family on any new and changed medications related to discharge diagnosis. Patient/family/caregiver given information for [employer] and agrees to enroll no    Patient's preferred e-mail:  Not at this time  Patient's preferred phone number:     Based on Loop alert triggers, patient will be contacted by nurse care manager for worsening symptoms.     Plan for follow-up call in 3-5 days based on severity of symptoms and risk factors.

## 2020-07-08 LAB
ATRIAL RATE: 99 BPM
CALCULATED P AXIS, ECG09: 58 DEGREES
CALCULATED R AXIS, ECG10: -7 DEGREES
CALCULATED T AXIS, ECG11: 47 DEGREES
DIAGNOSIS, 93000: NORMAL
P-R INTERVAL, ECG05: 132 MS
Q-T INTERVAL, ECG07: 332 MS
QRS DURATION, ECG06: 80 MS
QTC CALCULATION (BEZET), ECG08: 426 MS
SARS-COV-2, COV2NT: DETECTED
VENTRICULAR RATE, ECG03: 99 BPM

## 2020-07-08 NOTE — DISCHARGE INSTRUCTIONS
Patient Education        Chronic Obstructive Pulmonary Disease (COPD): Care Instructions  Your Care Instructions     Chronic obstructive pulmonary disease (COPD) is a general term for a group of lung diseases, including emphysema and chronic bronchitis. People with COPD have decreased airflow in and out of the lungs, which makes it hard to breathe. The airways also can get clogged with thick mucus. Cigarette smoking is a major cause of COPD. Although there is no cure for COPD, you can slow its progress. Following your treatment plan and taking care of yourself can help you feel better and live longer. Follow-up care is a key part of your treatment and safety. Be sure to make and go to all appointments, and call your doctor if you are having problems. It's also a good idea to know your test results and keep a list of the medicines you take. How can you care for yourself at home? Staying healthy  · Do not smoke. This is the most important step you can take to prevent more damage to your lungs. If you need help quitting, talk to your doctor about stop-smoking programs and medicines. These can increase your chances of quitting for good. · Avoid colds and flu. Get a pneumococcal vaccine shot. If you have had one before, ask your doctor whether you need a second dose. Get the flu vaccine every fall. If you must be around people with colds or the flu, wash your hands often. · Avoid secondhand smoke, air pollution, and high altitudes. Also avoid cold, dry air and hot, humid air. Stay at home with your windows closed when air pollution is bad. Medicines and oxygen therapy  · Take your medicines exactly as prescribed. Call your doctor if you think you are having a problem with your medicine. You may be taking medicines such as:  ? Bronchodilators. These help open your airways and make breathing easier. They are either short-acting (work for 6 to 9 hours) or long-acting (work for 24 hours).  You inhale most bronchodilators, so they start to act quickly. Always carry your quick-relief inhaler with you in case you need it while you are away from home. ? Corticosteroids (prednisone, budesonide). These reduce airway inflammation. They come in pill or inhaled form. You must take these medicines every day for them to work well. · Ask your doctor or pharmacist if a spacer is right for you. A spacer may help you get more inhaled medicine to your lungs. If you use one, ask how to use it properly. · Do not take any vitamins, over-the-counter medicine, or herbal products without talking to your doctor first.  · If your doctor prescribed antibiotics, take them as directed. Do not stop taking them just because you feel better. You need to take the full course of antibiotics. · If you use oxygen therapy, use the flow rate your doctor has recommended. Don't change it without talking to your doctor first. Oxygen therapy boosts the amount of oxygen in your blood and helps you breathe easier. Activity  · Get regular exercise. Walking is an easy way to get exercise. Start out slowly, and walk a little more each day. · Pay attention to your breathing. You are exercising too hard if you can't talk while you exercise. · Take short rest breaks when doing household chores and other activities. · Learn breathing methods--such as breathing through pursed lips--to help you become less short of breath. · If your doctor has not set you up with a pulmonary rehabilitation program, ask if rehab is right for you. Rehab includes exercise programs, education about your disease and how to manage it, help with diet and other changes, and emotional support. Diet  · Eat regular, healthy meals. Use bronchodilators about 1 hour before you eat to make it easier to eat. Eat several small meals instead of three large ones. Drink beverages at the end of the meal. Avoid foods that are hard to chew.   · Eat foods that contain protein so you don't lose muscle mass. · Talk with your doctor if you gain too much weight or if you lose weight without trying. Mental health  · Talk to your family, friends, or a therapist about your feelings. Some people feel frightened, angry, hopeless, helpless, and even guilty. Talking openly about bad feelings can help you cope. If these feelings last, talk to your doctor. When should you call for help? [id number] anytime you think you may need emergency care. For example, call if:  · You have severe trouble breathing. Call your doctor now or seek immediate medical care if:  · You have new or worse trouble breathing. · You cough up blood. · You have a fever. Watch closely for changes in your health, and be sure to contact your doctor if:  · You cough more deeply or more often, especially if you notice more mucus or a change in the color of your mucus. · You have new or worse swelling in your legs or belly. · You are not getting better as expected. Where can you learn more? Go to http://etelvina-olivier.info/  Enter Z420 in the search box to learn more about \"Chronic Obstructive Pulmonary Disease (COPD): Care Instructions. \"  Current as of: February 24, 2020               Content Version: 12.5  © 4768-4667 Healthwise, Incorporated. Care instructions adapted under license by "[employer]" (which disclaims liability or warranty for this information). If you have questions about a medical condition or this instruction, always ask your healthcare professional. [address] any warranty or liability for your use of this information. [employer] Activation    Thank you for requesting access to [employer]. Please follow the instructions below to securely access and download your online medical record. [employer] allows you to send messages to your doctor, view your test results, renew your prescriptions, schedule appointments, and more. How Do I Sign Up? 1.  In your internet browser, go to [email or web address]  2. Click on the First Time User? Click Here link in the Sign In box. You will be redirect to the New Member Sign Up page. 3. Enter your [employer] Access Code exactly as it appears below. You will not need to use this code after youve completed the sign-up process. If you do not sign up before the expiration date, you must request a new code. [employer] Access Code: [email or web address]  Expires: 2020  4:13 PM (This is the date your [employer] access code will )    4. Enter the last four digits of your Social Security Number (xxxx) and Date of Birth (mm/dd/yyyy) as indicated and click Submit. You will be taken to the next sign-up page. 5. Create a ESILLAGEt ID. This will be your [employer] login ID and cannot be changed, so think of one that is secure and easy to remember. 6. Create a [employer] password. You can change your password at any time. 7. Enter your Password Reset Question and Answer. This can be used at a later time if you forget your password. 8. Enter your e-mail address. You will receive e-mail notification when new information is available in [address] 9. Click Sign Up. You can now view and download portions of your medical record. 10. Click the Download Summary menu link to download a portable copy of your medical information. Additional Information    If you have questions, please visit the Frequently Asked Questions section of the [employer] website at [email or web address] Remember, [employer] is NOT to be used for urgent needs. For medical emergencies, dial 911.

## 2020-07-09 ENCOUNTER — HOSPITAL ENCOUNTER (INPATIENT)
Age: 60
LOS: 9 days | Discharge: HOME HEALTH | DRG: 177 | End: 2020-07-18
Attending: EMERGENCY MEDICINE | Admitting: FAMILY MEDICINE
Payer: MEDICARE

## 2020-07-09 ENCOUNTER — APPOINTMENT (OUTPATIENT)
Dept: GENERAL RADIOLOGY | Age: 60
DRG: 177 | End: 2020-07-09
Attending: STUDENT IN AN ORGANIZED HEALTH CARE EDUCATION/TRAINING PROGRAM
Payer: MEDICARE

## 2020-07-09 DIAGNOSIS — A41.9 SEPSIS, DUE TO UNSPECIFIED ORGANISM, UNSPECIFIED WHETHER ACUTE ORGAN DYSFUNCTION PRESENT (HCC): ICD-10-CM

## 2020-07-09 DIAGNOSIS — J44.9 CHRONIC OBSTRUCTIVE PULMONARY DISEASE, UNSPECIFIED COPD TYPE (HCC): ICD-10-CM

## 2020-07-09 DIAGNOSIS — U07.1 COVID-19 VIRUS DETECTED: Primary | ICD-10-CM

## 2020-07-09 DIAGNOSIS — Z99.81 ON HOME O2: ICD-10-CM

## 2020-07-09 PROBLEM — R09.02 HYPOXIA: Status: ACTIVE | Noted: 2020-07-09

## 2020-07-09 PROBLEM — E87.6 HYPOKALEMIA: Status: ACTIVE | Noted: 2020-07-09

## 2020-07-09 LAB
ALBUMIN SERPL-MCNC: 3.1 G/DL (ref 3.4–5)
ALBUMIN/GLOB SERPL: 0.9 {RATIO} (ref 0.8–1.7)
ALP SERPL-CCNC: 73 U/L (ref 45–117)
ALT SERPL-CCNC: 62 U/L (ref 13–56)
ANION GAP SERPL CALC-SCNC: 4 MMOL/L (ref 3–18)
APPEARANCE UR: CLEAR
APTT PPP: 30.8 SEC (ref 23–36.4)
ARTERIAL PATENCY WRIST A: YES
AST SERPL-CCNC: 39 U/L (ref 10–38)
BACTERIA SPEC CULT: NORMAL
BACTERIA URNS QL MICRO: ABNORMAL /HPF
BASE DEFICIT BLD-SCNC: 1 MMOL/L
BASOPHILS # BLD: 0 K/UL (ref 0–0.1)
BASOPHILS NFR BLD: 0 % (ref 0–2)
BDY SITE: ABNORMAL
BILIRUB SERPL-MCNC: 0.7 MG/DL (ref 0.2–1)
BILIRUB UR QL: NEGATIVE
BNP SERPL-MCNC: 137 PG/ML (ref 0–900)
BUN SERPL-MCNC: 13 MG/DL (ref 7–18)
BUN/CREAT SERPL: 14 (ref 12–20)
CALCIUM SERPL-MCNC: 8.7 MG/DL (ref 8.5–10.1)
CC UR VC: NORMAL
CHLORIDE SERPL-SCNC: 104 MMOL/L (ref 100–111)
CO2 SERPL-SCNC: 30 MMOL/L (ref 21–32)
COLOR UR: YELLOW
CREAT SERPL-MCNC: 0.92 MG/DL (ref 0.6–1.3)
CRP SERPL-MCNC: 12.9 MG/DL (ref 0–0.3)
D DIMER PPP FEU-MCNC: <0.27 UG/ML(FEU)
DIFFERENTIAL METHOD BLD: ABNORMAL
EOSINOPHIL # BLD: 0 K/UL (ref 0–0.4)
EOSINOPHIL NFR BLD: 0 % (ref 0–5)
EPITH CASTS URNS QL MICRO: ABNORMAL /LPF (ref 0–5)
ERYTHROCYTE [DISTWIDTH] IN BLOOD BY AUTOMATED COUNT: 13.9 % (ref 11.6–14.5)
ERYTHROCYTE [SEDIMENTATION RATE] IN BLOOD: 39 MM/HR (ref 0–30)
FERRITIN SERPL-MCNC: 150 NG/ML (ref 8–388)
GAS FLOW.O2 O2 DELIVERY SYS: ABNORMAL L/MIN
GAS FLOW.O2 SETTING OXYMISER: 3 L/M
GLOBULIN SER CALC-MCNC: 3.6 G/DL (ref 2–4)
GLUCOSE BLD STRIP.AUTO-MCNC: 285 MG/DL (ref 70–110)
GLUCOSE SERPL-MCNC: 171 MG/DL (ref 74–99)
GLUCOSE UR STRIP.AUTO-MCNC: 100 MG/DL
HCO3 BLD-SCNC: 24.1 MMOL/L (ref 22–26)
HCT VFR BLD AUTO: 34.6 % (ref 35–45)
HGB BLD-MCNC: 11.5 G/DL (ref 12–16)
HGB UR QL STRIP: NEGATIVE
INR PPP: 1 (ref 0.8–1.2)
KETONES UR QL STRIP.AUTO: NEGATIVE MG/DL
LACTATE BLD-SCNC: 0.98 MMOL/L (ref 0.4–2)
LACTATE SERPL-SCNC: 1.2 MMOL/L (ref 0.4–2)
LDH SERPL L TO P-CCNC: 275 U/L (ref 81–234)
LEUKOCYTE ESTERASE UR QL STRIP.AUTO: ABNORMAL
LYMPHOCYTES # BLD: 1 K/UL (ref 0.9–3.6)
LYMPHOCYTES NFR BLD: 18 % (ref 21–52)
MCH RBC QN AUTO: 30.6 PG (ref 24–34)
MCHC RBC AUTO-ENTMCNC: 33.2 G/DL (ref 31–37)
MCV RBC AUTO: 92 FL (ref 74–97)
MONOCYTES # BLD: 0.5 K/UL (ref 0.05–1.2)
MONOCYTES NFR BLD: 9 % (ref 3–10)
MUCOUS THREADS URNS QL MICRO: ABNORMAL /LPF
NEUTS SEG # BLD: 4.2 K/UL (ref 1.8–8)
NEUTS SEG NFR BLD: 73 % (ref 40–73)
NITRITE UR QL STRIP.AUTO: NEGATIVE
PCO2 BLD: 43.2 MMHG (ref 35–45)
PH BLD: 7.36 [PH] (ref 7.35–7.45)
PH UR STRIP: 5.5 [PH] (ref 5–8)
PLATELET # BLD AUTO: 241 K/UL (ref 135–420)
PMV BLD AUTO: 9.1 FL (ref 9.2–11.8)
PO2 BLD: 209 MMHG (ref 80–100)
POTASSIUM SERPL-SCNC: 3.5 MMOL/L (ref 3.5–5.5)
PROT SERPL-MCNC: 6.7 G/DL (ref 6.4–8.2)
PROT UR STRIP-MCNC: NEGATIVE MG/DL
PROTHROMBIN TIME: 13.4 SEC (ref 11.5–15.2)
RBC # BLD AUTO: 3.76 M/UL (ref 4.2–5.3)
SAO2 % BLD: 100 % (ref 92–97)
SARS-COV-2, COV2NT: DETECTED
SERVICE CMNT-IMP: ABNORMAL
SERVICE CMNT-IMP: NORMAL
SODIUM SERPL-SCNC: 138 MMOL/L (ref 136–145)
SP GR UR REFRACTOMETRY: 1.01 (ref 1–1.03)
SPECIMEN TYPE: ABNORMAL
TROPONIN I SERPL-MCNC: <0.02 NG/ML (ref 0–0.04)
UROBILINOGEN UR QL STRIP.AUTO: 1 EU/DL (ref 0.2–1)
WBC # BLD AUTO: 5.8 K/UL (ref 4.6–13.2)
WBC URNS QL MICRO: ABNORMAL /HPF (ref 0–5)

## 2020-07-09 PROCEDURE — 74011000250 HC RX REV CODE- 250: Performed by: STUDENT IN AN ORGANIZED HEALTH CARE EDUCATION/TRAINING PROGRAM

## 2020-07-09 PROCEDURE — 85652 RBC SED RATE AUTOMATED: CPT

## 2020-07-09 PROCEDURE — 96374 THER/PROPH/DIAG INJ IV PUSH: CPT

## 2020-07-09 PROCEDURE — 81001 URINALYSIS AUTO W/SCOPE: CPT

## 2020-07-09 PROCEDURE — 93005 ELECTROCARDIOGRAM TRACING: CPT

## 2020-07-09 PROCEDURE — 83880 ASSAY OF NATRIURETIC PEPTIDE: CPT

## 2020-07-09 PROCEDURE — 86140 C-REACTIVE PROTEIN: CPT

## 2020-07-09 PROCEDURE — 85025 COMPLETE CBC W/AUTO DIFF WBC: CPT

## 2020-07-09 PROCEDURE — 85610 PROTHROMBIN TIME: CPT

## 2020-07-09 PROCEDURE — 87086 URINE CULTURE/COLONY COUNT: CPT

## 2020-07-09 PROCEDURE — 74011250636 HC RX REV CODE- 250/636: Performed by: STUDENT IN AN ORGANIZED HEALTH CARE EDUCATION/TRAINING PROGRAM

## 2020-07-09 PROCEDURE — 96361 HYDRATE IV INFUSION ADD-ON: CPT

## 2020-07-09 PROCEDURE — 80053 COMPREHEN METABOLIC PANEL: CPT

## 2020-07-09 PROCEDURE — 83615 LACTATE (LD) (LDH) ENZYME: CPT

## 2020-07-09 PROCEDURE — 74011250637 HC RX REV CODE- 250/637: Performed by: STUDENT IN AN ORGANIZED HEALTH CARE EDUCATION/TRAINING PROGRAM

## 2020-07-09 PROCEDURE — 94762 N-INVAS EAR/PLS OXIMTRY CONT: CPT

## 2020-07-09 PROCEDURE — 74011250637 HC RX REV CODE- 250/637: Performed by: EMERGENCY MEDICINE

## 2020-07-09 PROCEDURE — 74011636637 HC RX REV CODE- 636/637: Performed by: STUDENT IN AN ORGANIZED HEALTH CARE EDUCATION/TRAINING PROGRAM

## 2020-07-09 PROCEDURE — 84484 ASSAY OF TROPONIN QUANT: CPT

## 2020-07-09 PROCEDURE — 36600 WITHDRAWAL OF ARTERIAL BLOOD: CPT

## 2020-07-09 PROCEDURE — 5A09457 ASSISTANCE WITH RESPIRATORY VENTILATION, 24-96 CONSECUTIVE HOURS, CONTINUOUS POSITIVE AIRWAY PRESSURE: ICD-10-PCS | Performed by: FAMILY MEDICINE

## 2020-07-09 PROCEDURE — 71045 X-RAY EXAM CHEST 1 VIEW: CPT

## 2020-07-09 PROCEDURE — 82803 BLOOD GASES ANY COMBINATION: CPT

## 2020-07-09 PROCEDURE — 96375 TX/PRO/DX INJ NEW DRUG ADDON: CPT

## 2020-07-09 PROCEDURE — 83605 ASSAY OF LACTIC ACID: CPT

## 2020-07-09 PROCEDURE — 74011250636 HC RX REV CODE- 250/636: Performed by: EMERGENCY MEDICINE

## 2020-07-09 PROCEDURE — 82962 GLUCOSE BLOOD TEST: CPT

## 2020-07-09 PROCEDURE — 87040 BLOOD CULTURE FOR BACTERIA: CPT

## 2020-07-09 PROCEDURE — 82728 ASSAY OF FERRITIN: CPT

## 2020-07-09 PROCEDURE — 99285 EMERGENCY DEPT VISIT HI MDM: CPT

## 2020-07-09 PROCEDURE — 85730 THROMBOPLASTIN TIME PARTIAL: CPT

## 2020-07-09 PROCEDURE — 85379 FIBRIN DEGRADATION QUANT: CPT

## 2020-07-09 PROCEDURE — 65660000000 HC RM CCU STEPDOWN

## 2020-07-09 RX ORDER — IPRATROPIUM BROMIDE AND ALBUTEROL SULFATE 2.5; .5 MG/3ML; MG/3ML
3 SOLUTION RESPIRATORY (INHALATION)
Status: DISCONTINUED | OUTPATIENT
Start: 2020-07-09 | End: 2020-07-18 | Stop reason: HOSPADM

## 2020-07-09 RX ORDER — INSULIN GLARGINE 100 [IU]/ML
25 INJECTION, SOLUTION SUBCUTANEOUS
Status: DISCONTINUED | OUTPATIENT
Start: 2020-07-09 | End: 2020-07-12

## 2020-07-09 RX ORDER — ENOXAPARIN SODIUM 100 MG/ML
40 INJECTION SUBCUTANEOUS EVERY 24 HOURS
Status: DISCONTINUED | OUTPATIENT
Start: 2020-07-09 | End: 2020-07-09

## 2020-07-09 RX ORDER — VANCOMYCIN 2 GRAM/500 ML IN 0.9 % SODIUM CHLORIDE INTRAVENOUS
2000
Status: COMPLETED | OUTPATIENT
Start: 2020-07-09 | End: 2020-07-09

## 2020-07-09 RX ORDER — ACETAMINOPHEN 500 MG
500 TABLET ORAL
Status: DISCONTINUED | OUTPATIENT
Start: 2020-07-09 | End: 2020-07-09

## 2020-07-09 RX ORDER — DEXTROSE 50 % IN WATER (D50W) INTRAVENOUS SYRINGE
25-50 AS NEEDED
Status: DISCONTINUED | OUTPATIENT
Start: 2020-07-09 | End: 2020-07-10

## 2020-07-09 RX ORDER — ACETAMINOPHEN 500 MG
500 TABLET ORAL
Status: DISCONTINUED | OUTPATIENT
Start: 2020-07-09 | End: 2020-07-18 | Stop reason: HOSPADM

## 2020-07-09 RX ORDER — ZINC SULFATE 50(220)MG
1 CAPSULE ORAL DAILY
Status: DISCONTINUED | OUTPATIENT
Start: 2020-07-10 | End: 2020-07-18 | Stop reason: HOSPADM

## 2020-07-09 RX ORDER — HYDROCHLOROTHIAZIDE 25 MG/1
12.5 TABLET ORAL DAILY
Status: DISCONTINUED | OUTPATIENT
Start: 2020-07-09 | End: 2020-07-18 | Stop reason: HOSPADM

## 2020-07-09 RX ORDER — FLUTICASONE PROPIONATE 50 MCG
2 SPRAY, SUSPENSION (ML) NASAL DAILY
Status: DISCONTINUED | OUTPATIENT
Start: 2020-07-10 | End: 2020-07-18 | Stop reason: HOSPADM

## 2020-07-09 RX ORDER — ALBUTEROL SULFATE 1.25 MG/3ML
1.25 SOLUTION RESPIRATORY (INHALATION)
Status: DISCONTINUED | OUTPATIENT
Start: 2020-07-09 | End: 2020-07-18 | Stop reason: HOSPADM

## 2020-07-09 RX ORDER — ACETAMINOPHEN 325 MG/1
650 TABLET ORAL
Status: DISCONTINUED | OUTPATIENT
Start: 2020-07-09 | End: 2020-07-09

## 2020-07-09 RX ORDER — FUROSEMIDE 20 MG/1
20 TABLET ORAL DAILY
Status: DISCONTINUED | OUTPATIENT
Start: 2020-07-10 | End: 2020-07-18 | Stop reason: HOSPADM

## 2020-07-09 RX ORDER — ASCORBIC ACID 250 MG
500 TABLET ORAL 2 TIMES DAILY
Status: DISCONTINUED | OUTPATIENT
Start: 2020-07-09 | End: 2020-07-18 | Stop reason: HOSPADM

## 2020-07-09 RX ORDER — WATER FOR INJECTION,STERILE
VIAL (ML) INJECTION
Status: DISPENSED
Start: 2020-07-09 | End: 2020-07-10

## 2020-07-09 RX ORDER — MELATONIN
2000 DAILY
Status: DISCONTINUED | OUTPATIENT
Start: 2020-07-10 | End: 2020-07-18 | Stop reason: HOSPADM

## 2020-07-09 RX ORDER — ENOXAPARIN SODIUM 100 MG/ML
30 INJECTION SUBCUTANEOUS EVERY 12 HOURS
Status: DISCONTINUED | OUTPATIENT
Start: 2020-07-09 | End: 2020-07-09 | Stop reason: SDUPTHER

## 2020-07-09 RX ORDER — LORATADINE 10 MG/1
10 TABLET ORAL DAILY
Status: DISCONTINUED | OUTPATIENT
Start: 2020-07-10 | End: 2020-07-18 | Stop reason: HOSPADM

## 2020-07-09 RX ORDER — ACETAMINOPHEN 650 MG/1
500 SUPPOSITORY RECTAL
Status: DISCONTINUED | OUTPATIENT
Start: 2020-07-09 | End: 2020-07-18 | Stop reason: HOSPADM

## 2020-07-09 RX ORDER — DEXAMETHASONE SODIUM PHOSPHATE 4 MG/ML
6 INJECTION, SOLUTION INTRA-ARTICULAR; INTRALESIONAL; INTRAMUSCULAR; INTRAVENOUS; SOFT TISSUE
Status: COMPLETED | OUTPATIENT
Start: 2020-07-09 | End: 2020-07-09

## 2020-07-09 RX ORDER — ENOXAPARIN SODIUM 100 MG/ML
40 INJECTION SUBCUTANEOUS EVERY 12 HOURS
Status: DISCONTINUED | OUTPATIENT
Start: 2020-07-09 | End: 2020-07-10

## 2020-07-09 RX ORDER — ACETAMINOPHEN 650 MG/1
650 SUPPOSITORY RECTAL
Status: DISCONTINUED | OUTPATIENT
Start: 2020-07-09 | End: 2020-07-09

## 2020-07-09 RX ORDER — MONTELUKAST SODIUM 10 MG/1
10 TABLET ORAL
Status: DISCONTINUED | OUTPATIENT
Start: 2020-07-09 | End: 2020-07-18 | Stop reason: HOSPADM

## 2020-07-09 RX ORDER — ACETAMINOPHEN 500 MG
1000 TABLET ORAL
Status: COMPLETED | OUTPATIENT
Start: 2020-07-09 | End: 2020-07-09

## 2020-07-09 RX ORDER — SODIUM CHLORIDE 0.9 % (FLUSH) 0.9 %
5-10 SYRINGE (ML) INJECTION AS NEEDED
Status: DISCONTINUED | OUTPATIENT
Start: 2020-07-09 | End: 2020-07-18 | Stop reason: HOSPADM

## 2020-07-09 RX ORDER — INSULIN GLARGINE 100 [IU]/ML
20 INJECTION, SOLUTION SUBCUTANEOUS
Status: DISCONTINUED | OUTPATIENT
Start: 2020-07-09 | End: 2020-07-09

## 2020-07-09 RX ORDER — INSULIN LISPRO 100 [IU]/ML
INJECTION, SOLUTION INTRAVENOUS; SUBCUTANEOUS
Status: DISCONTINUED | OUTPATIENT
Start: 2020-07-09 | End: 2020-07-18 | Stop reason: HOSPADM

## 2020-07-09 RX ORDER — CEFTRIAXONE 1 G/1
1 INJECTION, POWDER, FOR SOLUTION INTRAMUSCULAR; INTRAVENOUS
Status: COMPLETED | OUTPATIENT
Start: 2020-07-09 | End: 2020-07-09

## 2020-07-09 RX ORDER — LISINOPRIL 20 MG/1
20 TABLET ORAL DAILY
Status: DISCONTINUED | OUTPATIENT
Start: 2020-07-09 | End: 2020-07-18 | Stop reason: HOSPADM

## 2020-07-09 RX ORDER — PANTOPRAZOLE SODIUM 40 MG/1
40 TABLET, DELAYED RELEASE ORAL 2 TIMES DAILY
Status: DISCONTINUED | OUTPATIENT
Start: 2020-07-09 | End: 2020-07-18 | Stop reason: HOSPADM

## 2020-07-09 RX ORDER — MAGNESIUM SULFATE 100 %
4 CRYSTALS MISCELLANEOUS AS NEEDED
Status: DISCONTINUED | OUTPATIENT
Start: 2020-07-09 | End: 2020-07-18 | Stop reason: HOSPADM

## 2020-07-09 RX ADMIN — INSULIN GLARGINE 25 UNITS: 100 INJECTION, SOLUTION SUBCUTANEOUS at 21:00

## 2020-07-09 RX ADMIN — HYDROCHLOROTHIAZIDE 12.5 MG: 25 TABLET ORAL at 20:40

## 2020-07-09 RX ADMIN — IPRATROPIUM BROMIDE AND ALBUTEROL SULFATE 3 ML: .5; 3 SOLUTION RESPIRATORY (INHALATION) at 23:29

## 2020-07-09 RX ADMIN — ENOXAPARIN SODIUM 40 MG: 40 INJECTION SUBCUTANEOUS at 21:00

## 2020-07-09 RX ADMIN — LISINOPRIL 20 MG: 20 TABLET ORAL at 20:40

## 2020-07-09 RX ADMIN — PANTOPRAZOLE 40 MG: 40 TABLET, DELAYED RELEASE ORAL at 21:00

## 2020-07-09 RX ADMIN — SODIUM CHLORIDE 1000 ML: 900 INJECTION, SOLUTION INTRAVENOUS at 15:57

## 2020-07-09 RX ADMIN — MONTELUKAST 10 MG: 10 TABLET, FILM COATED ORAL at 21:00

## 2020-07-09 RX ADMIN — DEXAMETHASONE SODIUM PHOSPHATE 6 MG: 4 INJECTION, SOLUTION INTRAMUSCULAR; INTRAVENOUS at 16:07

## 2020-07-09 RX ADMIN — INSULIN LISPRO 6 UNITS: 100 INJECTION, SOLUTION INTRAVENOUS; SUBCUTANEOUS at 23:10

## 2020-07-09 RX ADMIN — CEFTRIAXONE SODIUM 1 G: 1 INJECTION, POWDER, FOR SOLUTION INTRAMUSCULAR; INTRAVENOUS at 16:09

## 2020-07-09 RX ADMIN — SODIUM CHLORIDE 648 ML: 900 INJECTION, SOLUTION INTRAVENOUS at 16:37

## 2020-07-09 RX ADMIN — SODIUM CHLORIDE 1000 ML: 900 INJECTION, SOLUTION INTRAVENOUS at 15:37

## 2020-07-09 RX ADMIN — SODIUM CHLORIDE 1000 ML: 900 INJECTION, SOLUTION INTRAVENOUS at 16:07

## 2020-07-09 RX ADMIN — Medication 500 MG: at 21:00

## 2020-07-09 RX ADMIN — ACETAMINOPHEN 1000 MG: 500 TABLET, FILM COATED ORAL at 15:08

## 2020-07-09 RX ADMIN — VANCOMYCIN HYDROCHLORIDE 2000 MG: 10 INJECTION, POWDER, LYOPHILIZED, FOR SOLUTION INTRAVENOUS at 16:12

## 2020-07-09 NOTE — PROGRESS NOTES
Discussed findings with patient's daughter Ms. Haskins Doing was a medical power of . Patient has not been feeling well. Daughter states that she is picking her up with his return to the emergency room. Patient has been tested twice for COVID. She is positive on this result. I will see the patient when she returns to the emergency room.

## 2020-07-09 NOTE — ED TRIAGE NOTES
Pt was called to come in to the ED for positive COVID test results.  Pt reports taht she is more SOB and has had to increase her O2 to 4 L via NC.

## 2020-07-09 NOTE — H&P
Admission History and Physical  [facility]      Patient: Michele Hollins MRN: [id number]  CSN: [id number]    YOB: 1960  Age: 60 y.o. Sex: female      DOA: 7/9/2020       HPI:     Michele Hollins is a 60 y.o. female with PMH of COPD on home O2, IDDM2, HTN, HFpEF, SHERRIE on CPAP, GERD, allergic rhinitis, presenting with fever, sob and cough w/ known covid+. Headache and non-bloody diarrhea starting sunday. Developed cough on Monday and has been progressively more sob this week. She has congestion and feels her chest is congested but cant get any sputum out. Multiple family members with confirmed covid (son, granddaughter) and a daughter with symptoms and pending covid. She has had exposure to these patients. Been to ED twice in the past week: 07/06, 07/07. Covid swab 7/6 positive. In the last 24 hours, she has been using her trilogy machine ~14 hours. Her home oxygen increased to 4L. Not able to eat or take all her oral meds in last week. ED Course:   Vitals: febrile 102.7, HR 92, /83, RR 20-27, sating 95% on 4L    CBC: wcc 5.8, leukocytes 18  CMP: glucose 171, AST 39, ALT 62  LA - 1.2  trops <0.02    COVID19 labs: coags wnl, d-dimer <0.27, crp 12.9,  ESR 39,  ferritin 150,   EKG: NSR  CXR: mild pulmonary vascular congestion. Slight decrease in inspiration since the previous day. Slight atelectasis in the retrocardiac area. Bcx    Meds: tylenol, rocephin and vanc, 2.6L NS bolus, decadron 6mg     Review of Systems   Constitutional: Positive for chills, fever and malaise/fatigue. HENT: Positive for congestion. Negative for sore throat. Eyes: Negative for double vision and redness. Respiratory: Positive for cough and shortness of breath. Negative for hemoptysis, sputum production and wheezing. Cardiovascular: Positive for chest pain, orthopnea and leg swelling. Negative for palpitations.    Gastrointestinal: Positive for diarrhea and nausea. Negative for abdominal pain, blood in stool and vomiting. Genitourinary: Negative for dysuria, frequency, hematuria and urgency. Musculoskeletal: Positive for myalgias. Skin: Negative for rash.        Past Medical History:   Diagnosis Date    Asthma     Chronic lung disease     COPD     Cystocele, midline     Diabetes mellitus (HCC)     GERD (gastroesophageal reflux disease)     Hidradenitis suppurativa     Hyperlipidemia     Hypertension     SHERRIE on CPAP     CPAP    Stress incontinence        Past Surgical History:   Procedure Laterality Date    BREAST SURGERY PROCEDURE UNLISTED      Right breast benign tumor removal    HX APPENDECTOMY      HX CHOLECYSTECTOMY      HX DILATION AND CURETTAGE      Dysfunctional uterine bleeding, thought 2/2 fibroids    HX TUBAL LIGATION         Family History   Problem Relation Age of Onset    Hypertension Mother     Stroke Mother     Breast Cancer Mother         Bilateral mastectomies    Cancer Mother         ovarian and breast    Heart Failure Mother     Heart Attack Father         2011    Heart Surgery Father         CABG    Heart Failure Father     COPD Sister         Heavy smoker    Cancer Sister         ovarian    Heart Failure Sister     Lung Disease Sister     Asthma Child     Cancer Maternal Aunt         pancreatic    Cancer Maternal Grandfather         stomach       Social History     Socioeconomic History    Marital status: LEGALLY      Spouse name: Not on file    Number of children: Not on file    Years of education: Not on file    Highest education level: Not on file   Tobacco Use    Smoking status: Former Smoker     Packs/day: 1.00     Years: 30.00     Pack years: 30.00     Types: Cigarettes     Start date: 1966     Last attempt to quit: 2006     Years since quittin.8    Smokeless tobacco: Former User    Tobacco comment: 1-1.5 packs per day   Substance and Sexual Activity    Alcohol use: No    Drug use: No    Sexual activity: Not Currently       Allergies   Allergen Reactions    Ancef [Cefazolin] Hives    Contrast Agent [Iodine] Anaphylaxis, Shortness of Breath and Swelling     Needs pre-medication for IV contrast with Benadryl, Solu-Medrol    Fish Containing Products Anaphylaxis     Pt states she had a severe allergic reaction at 7 y/o.  Statins-Hmg-Coa Reductase Inhibitors Myalgia    Metformin Other (comments)     Abdominal pain, diarrhea.  Codeine Other (comments)     Altered mental status       Prior to Admission Medications   Prescriptions Last Dose Informant Patient Reported? Taking? NOVOLOG FLEXPEN U-100 INSULIN 100 unit/mL inpn  Self No No   Sig: Continue home Sliding scale insulin as prior to admission   Patient taking differently: 1 Units by SubCUTAneous route three (3) times daily. If BG <100=0u  101-150=5u  151-250=8u  251-300=12u  >300 call MD     OXYGEN-AIR DELIVERY SYSTEMS   Yes No   Si L by Nasal route continuous. First Choice   albuterol (PROVENTIL HFA, VENTOLIN HFA, PROAIR HFA) 90 mcg/actuation inhaler   No No   Sig: Take 2 Puffs by inhalation every four (4) hours as needed for Wheezing. albuterol-ipratropium (DUO-NEB) 2.5 mg-0.5 mg/3 ml nebu   No No   Sig: 3 mL by Nebulization route every four (4) hours as needed for Wheezing. aspirin delayed-release 81 mg tablet   Yes No   Sig: Take 81 mg by mouth daily. azithromycin (ZITHROMAX) 250 mg tablet   No No   Sig: Take 1 Tab by mouth every Monday, Wednesday, Friday. Monday-Friday. benzonatate (Tessalon Perles) 100 mg capsule   No No   Sig: Take 1 Cap by mouth three (3) times daily as needed for Cough for up to 7 days. dexAMETHasone (Decadron) 6 mg tablet   No No   Sig: Take one tablet PO daily   fluticasone propion-salmeterol (ADVAIR HFA) 230-21 mcg/actuation inhaler   Yes No   Sig: Take 2 Puffs by inhalation two (2) times a day.    fluticasone propionate (FLONASE ALLERGY RELIEF) 50 mcg/actuation nasal spray   Yes No   Si Sprays by Both Nostrils route daily. fluticasone propionate (Flovent HFA) 220 mcg/actuation inhaler   No No   Sig: Take 1 Puff by inhalation two (2) times a day. furosemide (Lasix) 20 mg tablet   Yes No   Sig: Take 20 mg by mouth daily. hydroCHLOROthiazide (HYDRODIURIL) 12.5 mg tablet   Yes No   Sig: Take 12.5 mg by mouth daily. insulin glargine (Basaglar KwikPen U-100 Insulin) 100 unit/mL (3 mL) inpn   Yes No   Si Units by SubCUTAneous route nightly. insulin glargine (LANTUS,BASAGLAR) 100 unit/mL (3 mL) inpn   No No   Si Units by SubCUTAneous route nightly. Please inject 40 units every bedtime. Indications: type 2 diabetes mellitus   lactobacillus sp. 50 billion cpu (BIO-K PLUS) 50 billion cell -375 mg cap capsule   No No   Sig: Take 1 Cap by mouth daily. lisinopril (PRINIVIL, ZESTRIL) 20 mg tablet   Yes No   Sig: Take 20 mg by mouth two (2) times a day. loratadine (CLARITIN) 10 mg tablet   Yes No   Sig: Take 10 mg by mouth daily as needed for Allergies. montelukast (SINGULAIR) 10 mg tablet   Yes No   Sig: Take 10 mg by mouth nightly. omeprazole (PRILOSEC) 40 mg capsule   Yes No   Sig: Take  by mouth.   pantoprazole (PROTONIX) 40 mg tablet   No No   Sig: Take 1 Tab by mouth daily. predniSONE (DELTASONE) 20 mg tablet   No No   Sig: Please take 60mg Prednisone(3 tabs) for 7 days, 50mg (2 1/2 tabs) for 7 days, 40mg(2 tabs) for 7 days, 30mg (1 1/2 tabs) for 7 days, 20mg (1 tabs) for 7 days, 10mg (1/2 tab) for 7 days, then take 5 mg daily from then on wards. predniSONE (DELTASONE) 5 mg tablet   No No   Sig: Take 1 Tab by mouth daily. simethicone (GAS-X) 125 mg capsule   Yes No   Sig: Take 125 mg by mouth four (4) times daily as needed for Flatulence. tiotropium bromide (SPIRIVA RESPIMAT) 2.5 mcg/actuation inhaler   Yes No   Sig: Take 2 Puffs by inhalation daily.       Facility-Administered Medications: None       Physical Exam:     Patient Vitals for the past 24 hrs:   Temp Pulse Resp BP SpO2   07/09/20 1615 99.6 °F (37.6 °C) 86 27 148/60 97 %   07/09/20 1411 (!) 102.7 °F (39.3 °C) 92 20 153/83 95 %       Physical Exam:   General:  Pale, fatigued,  in no acute distress. HEENT: Conjunctiva pink, sclera anicteric. EOMI. Pharynx moist, nonerythematous. Dry mucous membranes. Thyroid not enlarged, no nodules. Lymph: No cervical, supraclavicular, occipital or submandibular lymphadenopathy. No other gross abnormalities present. CV:  RRR, no murmurs/rubs/gallops. No visible pulsations or thrills. Resp:  No increased wob on 5L NC. Lungs clear to auscultation. No wheeze, rales, or rhonchi. Poor airflo. Speaking in broken sentences due to dyspnea. No accessory muscle use. Abd:  Soft, nontender, obese. No hepatosplenomegaly. No suprapubic tenderness. No CVA tenderness. Umbilical hernia. Rectal:  deferred  MS:  No joint deformity or instability. No atrophy. Neuro: A+Ox3. 5/5 strength bilateral upper extremities and lower extremities. Ext:  1+ pitting edema to knees. 2+ radial and dp pulses bilaterally.    Skin:  Skin warm, No rashes/ulcerations    IMAGING:     Recent Results (from the past 12 hour(s))   EKG, 12 LEAD, INITIAL    Collection Time: 07/09/20  3:17 PM   Result Value Ref Range    Ventricular Rate 86 BPM    Atrial Rate 86 BPM    P-R Interval 136 ms    QRS Duration 86 ms    Q-T Interval 354 ms    QTC Calculation (Bezet) 423 ms    Calculated P Axis 35 degrees    Calculated R Axis 32 degrees    Calculated T Axis 38 degrees    Diagnosis       Normal sinus rhythm  Normal ECG  When compared with ECG of 07-JUL-2020 16:09,  No significant change was found     CBC WITH AUTOMATED DIFF    Collection Time: 07/09/20  3:53 PM   Result Value Ref Range    WBC 5.8 4.6 - 13.2 K/uL    RBC 3.76 (L) 4.20 - 5.30 M/uL    HGB 11.5 (L) 12.0 - 16.0 g/dL    HCT 34.6 (L) 35.0 - 45.0 %    MCV 92.0 74.0 - 97.0 FL    MCH 30.6 24.0 - 34.0 PG    MCHC 33.2 31.0 - 37.0 g/dL    RDW 13.9 11.6 - 14.5 %    PLATELET 096 951 - 491 K/uL    MPV 9.1 (L) 9.2 - 11.8 FL    NEUTROPHILS 73 40 - 73 %    LYMPHOCYTES 18 (L) 21 - 52 %    MONOCYTES 9 3 - 10 %    EOSINOPHILS 0 0 - 5 %    BASOPHILS 0 0 - 2 %    ABS. NEUTROPHILS 4.2 1.8 - 8.0 K/UL    ABS. LYMPHOCYTES 1.0 0.9 - 3.6 K/UL    ABS. MONOCYTES 0.5 0.05 - 1.2 K/UL    ABS. EOSINOPHILS 0.0 0.0 - 0.4 K/UL    ABS. BASOPHILS 0.0 0.0 - 0.1 K/UL    DF AUTOMATED     LACTIC ACID    Collection Time: 07/09/20  3:53 PM   Result Value Ref Range    Lactic acid 1.2 0.4 - 2.0 MMOL/L   NT-PRO BNP    Collection Time: 07/09/20  3:53 PM   Result Value Ref Range    NT pro- 0 - 900 PG/ML   TROPONIN I    Collection Time: 07/09/20  3:53 PM   Result Value Ref Range    Troponin-I, QT <0.02 0.0 - 5.346 NG/ML   METABOLIC PANEL, COMPREHENSIVE    Collection Time: 07/09/20  3:53 PM   Result Value Ref Range    Sodium 138 136 - 145 mmol/L    Potassium 3.5 3.5 - 5.5 mmol/L    Chloride 104 100 - 111 mmol/L    CO2 30 21 - 32 mmol/L    Anion gap 4 3.0 - 18 mmol/L    Glucose 171 (H) 74 - 99 mg/dL    BUN 13 7.0 - 18 MG/DL    Creatinine 0.92 0.6 - 1.3 MG/DL    BUN/Creatinine ratio 14 12 - 20      GFR est AA >60 >60 ml/min/1.73m2    GFR est non-AA >60 >60 ml/min/1.73m2    Calcium 8.7 8.5 - 10.1 MG/DL    Bilirubin, total 0.7 0.2 - 1.0 MG/DL    ALT (SGPT) 62 (H) 13 - 56 U/L    AST (SGOT) 39 (H) 10 - 38 U/L    Alk.  phosphatase 73 45 - 117 U/L    Protein, total 6.7 6.4 - 8.2 g/dL    Albumin 3.1 (L) 3.4 - 5.0 g/dL    Globulin 3.6 2.0 - 4.0 g/dL    A-G Ratio 0.9 0.8 - 1.7     PROTHROMBIN TIME + INR    Collection Time: 07/09/20  3:53 PM   Result Value Ref Range    Prothrombin time 13.4 11.5 - 15.2 sec    INR 1.0 0.8 - 1.2     PTT    Collection Time: 07/09/20  3:53 PM   Result Value Ref Range    aPTT 30.8 23.0 - 36.4 SEC   D DIMER    Collection Time: 07/09/20  3:53 PM   Result Value Ref Range    D DIMER <0.27 <0.46 ug/ml(FEU)   FERRITIN    Collection Time: 07/09/20  3:53 PM   Result Value Ref Range Ferritin 150 8 - 388 NG/ML   C REACTIVE PROTEIN, QT    Collection Time: 07/09/20  3:53 PM   Result Value Ref Range    C-Reactive protein 12.9 (H) 0 - 0.3 mg/dL   SED RATE (ESR)    Collection Time: 07/09/20  3:53 PM   Result Value Ref Range    Sed rate, automated 39 (H) 0 - 30 mm/hr   LD    Collection Time: 07/09/20  3:53 PM   Result Value Ref Range     (H) 81 - 234 U/L   POC LACTIC ACID    Collection Time: 07/09/20  4:01 PM   Result Value Ref Range    Lactic Acid (POC) 0.98 0.40 - 2.00 mmol/L         Assessment/Plan:   Lauri De Dios is a 60 y.o. female with PMH of COPD on home O2, IDDM2, HTN, HFpEF, SHERRIE on CPAP, GERD, allergic rhinitis, now admitted for Acute on chronic hypoxic respiratory failure 2/2 covid-19.      Acute on chronic hypoxic respiratory failure likely secondary copd exacerbation 2/2 Covid-19   Pt febrile and tachypnea with increasing oxygen demands. Pt w/ positive covid swab (7/7). Pt with severe underlying lung disease, Severe COPD on max therapy w/ strong asthma component. Lab consistent with covid:No leukocytosis, dec lymphocytes, LA - 1.2, trops <0.02, raised crp 12.9,  ESR 39, . CXR: hypoinflation and some pulm congestion. BNp 132. No CHF component suspected. Will tx pt for copd exast and covid. Received decadron, vanc and rocephin in ED.     Plan:  - Admit to telemetry/covid unit  - Pulm consult in AM  - Suppl oxygen w/ goal 88-92%  - Abg now  - Duonebs q4hrs prn  - Albuterol PRN    - Will defer steroid, abxs to pulmonology   - Covid labs: cbc, bmp, ferritin, LD, D-dimer  - vitC 500mg twice daily, zinc sulfate 1mg daily, vit D 2000u daily   - enoxoparin 40mg q12 (BMI >30)  - tylenol 500mg q6hr as needed for fever (abnormal ltfs)  - FU BCx  - vitals per unit  - CXR in am  - consult to PT/OT/CM when appropriate    COPD/Asthma overlapping syndrome on 2L home 02, SHERRIE  Five exacerbations thus far in 2020 for COPD/Asthma exacerbation, most recent 5/21-5/26.  Pt was discharged on very long steroid taper, currently at 5mg decadron daily. Followed by Dr. Zhang Perez. On trilogy machine nightly at home. Strong asthma component given significant bronchospasm to a wide variety of classic environmental triggers (I.e. fumes, fragrances, chemicals, smoke, mold, etc). Pt was set to begin biologic tx. Currently takinmg decadron daily, azithro 250mg MWF, advair /21, flovent HFA 220mcg 1 puff twice daily, spirva, singular, duo-nebs and albuterol prn, claritin and flonase  - continue singular, claratin, flonase  - BIPAP qhs in place home trilogy     Insulin dependent Type 2 Diabetes  Home lantus 40u pm and novolog SSI.  on admission.  -Lantus 25U and increase as PO intake increases  -SSI   -Accuchecks ACHS  -clears diet, then diabec      Hypertension, HFpEF  ECHO (20) EF 65 - 70%. No regional wall motion abnormality. Moderate (grade 2) left ventricular diastolic dysfunction. Mildly dilated left atrium. Pulmonary hypertension. Pulmonary arterial systolic pressure is 61 mmHg. Severely elevated central venous pressure (15+ mmHg); IVC diameter is larger than 21 mm and collapses less than 50% with respiration. SBP in ED elevated  to 140s-170s. Pt on lisinopril 20mg BID and HCTZ 12.5mg daily at home. Pt also on Lasix 20mg daily PRN for lower extremity edema at home. Patient endorsing increased swelling in  Abdomen and legs.    - Continue Lasix 20mg in AM  - Continue Lisinopril 20mg BID  - Continue HCTZ 12.5 mg daily     GERD  Pt takes omeprazole 40mg daily and pepcid for breakthrough symptoms at home.   -Continue protonix 40mg daily     Morbid obesity- BMI 46       Diet Clears, then Diabetic   DVT Prophylaxis lovenox   GI Prophylaxis Protonix   Code status Full   Disposition [place]>[id number]      Point of Contact Megan Wahl  Relationship: Daughter  [phone]       Naida Dylan DENNEY, PGY-2  [facility] Medicine  2020

## 2020-07-09 NOTE — ED PROVIDER NOTES
EMERGENCY DEPARTMENT HISTORY AND PHYSICAL EXAM    3:48 PM      Date: 7/9/2020  Patient Name: Debora Velasquez    History of Presenting Illness     Chief Complaint   Patient presents with    Concern For COVID-19 (Coronavirus)    Shortness of Breath         History Provided By: Patient  Location/Duration/Severity/Modifying factors   HPI 61 yo F pmhx of COPD, HTN, HLD, GERD, DM - recently seen here 2 days ago for SOB and was found to be COVID pos on discharge testing - called of results and she noted increased O2 requirement at home, now to 4L, and reported back to ED as requested. She states she feels like she is working harder to breath. She states she feels warm. She presents with her grand daughter who is also known to be COVID positive.     PCP: Anastasia Brito MD    Current Facility-Administered Medications   Medication Dose Route Frequency Provider Last Rate Last Dose    sodium chloride (NS) flush 5-10 mL  5-10 mL IntraVENous PRN Daren Jauregui DO        sodium chloride 0.9 % bolus infusion 648 mL  648 mL IntraVENous ONCE Nel Jauregui DO        sterile water (preservative free) injection             enoxaparin (LOVENOX) injection 40 mg  40 mg SubCUTAneous Q24H Jessi Bah MD        insulin lispro (HUMALOG) injection   SubCUTAneous AC&HS Shaq Albrecht MD        glucose chewable tablet 16 g  4 Tab Oral PRN Shaq Albrecht MD        glucagon (GLUCAGEN) injection 1 mg  1 mg IntraMUSCular PRN Shaq Albrecht MD        dextrose (D50W) injection syrg 12.5-25 g  25-50 mL IntraVENous PRN Shaq Bah MD        insulin glargine (LANTUS) injection 20 Units  20 Units SubCUTAneous QHS Shaq Bah MD        acetaminophen (TYLENOL) tablet 650 mg  650 mg Oral Q6H PRN Germaine Potter MD        Or   [address] [place] [address] acetaminophen (TYLENOL) suppository 650 mg  650 mg Rectal Q6H PRN Germaine Potter MD         Current Outpatient Medications   Medication Sig Dispense Refill    fluticasone propionate (Flovent HFA) 220 mcg/actuation inhaler Take 1 Puff by inhalation two (2) times a day. 1 Inhaler 0    albuterol-ipratropium (DUO-NEB) 2.5 mg-0.5 mg/3 ml nebu 3 mL by Nebulization route every four (4) hours as needed for Wheezing. 30 Nebule 0    albuterol (PROVENTIL HFA, VENTOLIN HFA, PROAIR HFA) 90 mcg/actuation inhaler Take 2 Puffs by inhalation every four (4) hours as needed for Wheezing. 1 Inhaler 0    benzonatate (Tessalon Perles) 100 mg capsule Take 1 Cap by mouth three (3) times daily as needed for Cough for up to 7 days. 30 Cap 0    dexAMETHasone (Decadron) 6 mg tablet Take one tablet PO daily 5 Tab 0    predniSONE (DELTASONE) 5 mg tablet Take 1 Tab by mouth daily. 60 Tab 1    azithromycin (ZITHROMAX) 250 mg tablet Take 1 Tab by mouth every Monday, Wednesday, Friday. Monday-Friday. 30 Tab 0    omeprazole (PRILOSEC) 40 mg capsule Take  by mouth.  insulin glargine (Basaglar KwikPen U-100 Insulin) 100 unit/mL (3 mL) inpn 45 Units by SubCUTAneous route nightly.  insulin glargine (LANTUS,BASAGLAR) 100 unit/mL (3 mL) inpn 40 Units by SubCUTAneous route nightly. Please inject 40 units every bedtime. Indications: type 2 diabetes mellitus 28 Units 0    pantoprazole (PROTONIX) 40 mg tablet Take 1 Tab by mouth daily. 30 Tab 0    predniSONE (DELTASONE) 20 mg tablet Please take 60mg Prednisone(3 tabs) for 7 days, 50mg (2 1/2 tabs) for 7 days, 40mg(2 tabs) for 7 days, 30mg (1 1/2 tabs) for 7 days, 20mg (1 tabs) for 7 days, 10mg (1/2 tab) for 7 days, then take 5 mg daily from then on wards. 80 Tab 0    furosemide (Lasix) 20 mg tablet Take 20 mg by mouth daily.  lactobacillus sp. 50 billion cpu (BIO-K PLUS) 50 billion cell -375 mg cap capsule Take 1 Cap by mouth daily. 30 Cap 0    simethicone (GAS-X) 125 mg capsule Take 125 mg by mouth four (4) times daily as needed for Flatulence.  loratadine (CLARITIN) 10 mg tablet Take 10 mg by mouth daily as needed for Allergies.       lisinopril (PRINIVIL, ZESTRIL) 20 mg tablet Take 20 mg by mouth two (2) times a day.  fluticasone propionate (FLONASE ALLERGY RELIEF) 50 mcg/actuation nasal spray 2 Sprays by Both Nostrils route daily.  fluticasone propion-salmeterol (ADVAIR HFA) 230-21 mcg/actuation inhaler Take 2 Puffs by inhalation two (2) times a day.  hydroCHLOROthiazide (HYDRODIURIL) 12.5 mg tablet Take 12.5 mg by mouth daily.  tiotropium bromide (SPIRIVA RESPIMAT) 2.5 mcg/actuation inhaler Take 2 Puffs by inhalation daily.  NOVOLOG FLEXPEN U-100 INSULIN 100 unit/mL inpn Continue home Sliding scale insulin as prior to admission (Patient taking differently: 1 Units by SubCUTAneous route three (3) times daily. If BG <100=0u  101-150=5u  151-250=8u  251-300=12u  >300 call MD  ) 1 Pen 0    OXYGEN-AIR DELIVERY SYSTEMS 2 L by Nasal route continuous. [facility]      aspirin delayed-release 81 mg tablet Take 81 mg by mouth daily.  montelukast (SINGULAIR) 10 mg tablet Take 10 mg by mouth nightly.          Past History     Past Medical History:  Past Medical History:   Diagnosis Date    Asthma     Chronic lung disease     COPD     Cystocele, midline     Diabetes mellitus ([facility] [address]     GERD (gastroesophageal reflux disease)     Hidradenitis suppurativa     Hyperlipidemia     Hypertension     SHERRIE on CPAP     CPAP    Stress incontinence        Past Surgical History:  Past Surgical History:   Procedure Laterality Date    BREAST SURGERY PROCEDURE UNLISTED      Right breast benign tumor removal    HX APPENDECTOMY      HX CHOLECYSTECTOMY      HX DILATION AND CURETTAGE      Dysfunctional uterine bleeding, thought 2/2 fibroids    HX TUBAL LIGATION         Family History:  Family History   Problem Relation Age of Onset    Hypertension Mother     Stroke Mother     Breast Cancer Mother         Bilateral mastectomies    Cancer Mother         ovarian and breast    Heart Failure Mother     Heart Attack Father         2011    Heart Surgery Father CABG    Heart Failure Father     COPD Sister         Heavy smoker    Cancer Sister         ovarian    Heart Failure Sister     Lung Disease Sister     Asthma Child     Cancer Maternal Aunt         pancreatic    Cancer Maternal Grandfather         stomach       Social History:  Social History     Tobacco Use    Smoking status: Former Smoker     Packs/day: 1.00     Years: 30.00     Pack years: 30.00     Types: Cigarettes     Start date: 1966     Last attempt to quit: 2006     Years since quittin.8    Smokeless tobacco: Former User    Tobacco comment: 1-1.5 packs per day   Substance Use Topics    Alcohol use: No    Drug use: No       Allergies: Allergies   Allergen Reactions    Ancef [Cefazolin] Hives    Contrast Agent [Iodine] Anaphylaxis, Shortness of Breath and Swelling     Needs pre-medication for IV contrast with Benadryl, Solu-Medrol    Fish Containing Products Anaphylaxis     Pt states she had a severe allergic reaction at 7 y/o.  Statins-Hmg-Coa Reductase Inhibitors Myalgia    Metformin Other (comments)     Abdominal pain, diarrhea.  Codeine Other (comments)     Altered mental status         Review of Systems       Review of Systems   Constitutional: Positive for chills, fatigue and fever. Negative for diaphoresis. HENT: Negative for congestion. Eyes: Negative for visual disturbance. Respiratory: Positive for cough and shortness of breath. Negative for choking, chest tightness and wheezing. Cardiovascular: Negative for chest pain, palpitations and leg swelling. Gastrointestinal: Negative for abdominal pain, constipation, diarrhea, nausea and vomiting. Endocrine: Negative for polyuria. Genitourinary: Negative for difficulty urinating and dysuria. Musculoskeletal: Negative for gait problem and neck pain. Skin: Negative for rash and wound. Neurological: Positive for weakness and light-headedness. Negative for dizziness and syncope.          Physical Exam Visit Vitals  /68   Pulse 85   Temp 99.6 °F (37.6 °C)   Resp 24   Ht 5' 3\" (1.6 m)   Wt 121.6 kg (268 lb)   SpO2 97%   BMI 47.47 kg/m²         Physical Exam  Vitals signs and nursing note reviewed. Exam conducted with a chaperone present. Constitutional:       General: She is not in acute distress. Appearance: She is obese. She is not ill-appearing or toxic-appearing. Comments: Sitting in wheel chair, on NC, speaking in nearly complete sentences   HENT:      Head: Normocephalic. Mouth/Throat:      Mouth: Mucous membranes are moist.   Eyes:      Extraocular Movements: Extraocular movements intact. Conjunctiva/sclera: Conjunctivae normal.   Neck:      Musculoskeletal: Normal range of motion and neck supple. Vascular: No JVD. Cardiovascular:      Rate and Rhythm: Normal rate and regular rhythm. Pulmonary:      Effort: Respiratory distress (mild) present. No accessory muscle usage. Breath sounds: No decreased breath sounds or wheezing. Abdominal:      Palpations: Abdomen is soft. Tenderness: There is no abdominal tenderness. Musculoskeletal: Normal range of motion. Right lower leg: She exhibits no tenderness. No edema. Left lower leg: She exhibits no tenderness. No edema. Skin:     General: Skin is warm. Capillary Refill: Capillary refill takes less than 2 seconds. Neurological:      General: No focal deficit present. Mental Status: She is alert. Mental status is at baseline.    Psychiatric:         Mood and Affect: Mood normal.           Diagnostic Study Results     Labs -  Recent Results (from the past 12 hour(s))   EKG, 12 LEAD, INITIAL    Collection Time: 07/09/20  3:17 PM   Result Value Ref Range    Ventricular Rate 86 BPM    Atrial Rate 86 BPM    P-R Interval 136 ms    QRS Duration 86 ms    Q-T Interval 354 ms    QTC Calculation (Bezet) 423 ms    Calculated P Axis 35 degrees    Calculated R Axis 32 degrees    Calculated T Axis 38 degrees Diagnosis       Normal sinus rhythm  Normal ECG  When compared with ECG of 07-JUL-2020 16:09,  No significant change was found     CBC WITH AUTOMATED DIFF    Collection Time: 07/09/20  3:53 PM   Result Value Ref Range    WBC 5.8 4.6 - 13.2 K/uL    RBC 3.76 (L) 4.20 - 5.30 M/uL    HGB 11.5 (L) 12.0 - 16.0 g/dL    HCT 34.6 (L) 35.0 - 45.0 %    MCV 92.0 74.0 - 97.0 FL    MCH 30.6 24.0 - 34.0 PG    MCHC 33.2 31.0 - 37.0 g/dL    RDW 13.9 11.6 - 14.5 %    PLATELET 080 532 - 584 K/uL    MPV 9.1 (L) 9.2 - 11.8 FL    NEUTROPHILS 73 40 - 73 %    LYMPHOCYTES 18 (L) 21 - 52 %    MONOCYTES 9 3 - 10 %    EOSINOPHILS 0 0 - 5 %    BASOPHILS 0 0 - 2 %    ABS. NEUTROPHILS 4.2 1.8 - 8.0 K/UL    ABS. LYMPHOCYTES 1.0 0.9 - 3.6 K/UL    ABS. MONOCYTES 0.5 0.05 - 1.2 K/UL    ABS. EOSINOPHILS 0.0 0.0 - 0.4 K/UL    ABS. BASOPHILS 0.0 0.0 - 0.1 K/UL    DF AUTOMATED     LACTIC ACID    Collection Time: 07/09/20  3:53 PM   Result Value Ref Range    Lactic acid 1.2 0.4 - 2.0 MMOL/L   NT-PRO BNP    Collection Time: 07/09/20  3:53 PM   Result Value Ref Range    NT pro- 0 - 900 PG/ML   TROPONIN I    Collection Time: 07/09/20  3:53 PM   Result Value Ref Range    Troponin-I, QT <0.02 0.0 - 0.641 NG/ML   METABOLIC PANEL, COMPREHENSIVE    Collection Time: 07/09/20  3:53 PM   Result Value Ref Range    Sodium 138 136 - 145 mmol/L    Potassium 3.5 3.5 - 5.5 mmol/L    Chloride 104 100 - 111 mmol/L    CO2 30 21 - 32 mmol/L    Anion gap 4 3.0 - 18 mmol/L    Glucose 171 (H) 74 - 99 mg/dL    BUN 13 7.0 - 18 MG/DL    Creatinine 0.92 0.6 - 1.3 MG/DL    BUN/Creatinine ratio 14 12 - 20      GFR est AA >60 >60 ml/min/1.73m2    GFR est non-AA >60 >60 ml/min/1.73m2    Calcium 8.7 8.5 - 10.1 MG/DL    Bilirubin, total 0.7 0.2 - 1.0 MG/DL    ALT (SGPT) 62 (H) 13 - 56 U/L    AST (SGOT) 39 (H) 10 - 38 U/L    Alk.  phosphatase 73 45 - 117 U/L    Protein, total 6.7 6.4 - 8.2 g/dL    Albumin 3.1 (L) 3.4 - 5.0 g/dL    Globulin 3.6 2.0 - 4.0 g/dL    A-G Ratio 0.9 0.8 - 1. 7     PROTHROMBIN TIME + INR    Collection Time: 07/09/20  3:53 PM   Result Value Ref Range    Prothrombin time 13.4 11.5 - 15.2 sec    INR 1.0 0.8 - 1.2     PTT    Collection Time: 07/09/20  3:53 PM   Result Value Ref Range    aPTT 30.8 23.0 - 36.4 SEC   D DIMER    Collection Time: 07/09/20  3:53 PM   Result Value Ref Range    D DIMER <0.27 <0.46 ug/ml(FEU)   FERRITIN    Collection Time: 07/09/20  3:53 PM   Result Value Ref Range    Ferritin 150 8 - 388 NG/ML   C REACTIVE PROTEIN, QT    Collection Time: 07/09/20  3:53 PM   Result Value Ref Range    C-Reactive protein 12.9 (H) 0 - 0.3 mg/dL   SED RATE (ESR)    Collection Time: 07/09/20  3:53 PM   Result Value Ref Range    Sed rate, automated 39 (H) 0 - 30 mm/hr   LD    Collection Time: 07/09/20  3:53 PM   Result Value Ref Range     (H) 81 - 234 U/L   POC LACTIC ACID    Collection Time: 07/09/20  4:01 PM   Result Value Ref Range    Lactic Acid (POC) 0.98 0.40 - 2.00 mmol/L   URINALYSIS W/ RFLX MICROSCOPIC    Collection Time: 07/09/20  7:16 PM   Result Value Ref Range    Color YELLOW      Appearance CLEAR      Specific gravity 1.011 1.005 - 1.030      pH (UA) 5.5 5.0 - 8.0      Protein Negative NEG mg/dL    Glucose 100 (A) NEG mg/dL    Ketone Negative NEG mg/dL    Bilirubin Negative NEG      Blood Negative NEG      Urobilinogen 1.0 0.2 - 1.0 EU/dL    Nitrites Negative NEG      Leukocyte Esterase TRACE (A) NEG         Radiologic Studies -   XR CHEST PORT   Final Result   IMPRESSION: Decreased inspiration since the prior day, otherwise no change            Medical Decision Making   I am the first provider for this patient. I reviewed the vital signs, available nursing notes, past medical history, past surgical history, family history and social history. Vital Signs-Reviewed the patient's vital signs. EKG: reviewed as noted below    Records Reviewed:  Old Medical Records, Previous electrocardiograms, Previous Radiology Studies and Previous Laboratory Studies (Time of Review: 3:48 PM)    ED Course: Progress Notes, Reevaluation, and Consults:    ED Course as of Jul 09 1952   Thu Jul 09, 2020   1422 Temp(!): 102.7 °F (39.3 °C) [GW]   1422 Pulse (Heart Rate): 92 [GW]   1422 BP: 153/83 [GW]   1422 MAP (Calculated): 106 [GW]   1422 Resp Rate: 20 [GW]   1422 Vitals and fever noted. Concern for sepsis   O2 Sat (%): 95 % [GW]   1546 CXR without changes to support worsening COVID disease. [GW]   1547 EKG with NSR and no significant changes from prior. [GW]   1547 Sepsis workup given fever, comorbidity, COVID+, with increased O2 requirement. [GW]      ED Course User Index  [GW] Tabby Balbuena MD       Provider Notes (Medical Decision Making):   MDM  Number of Diagnoses or Management Options  Chronic obstructive pulmonary disease, unspecified COPD type ([facility]):   COVID-19 virus detected: On home O2:   Sepsis, due to unspecified organism, unspecified whether acute organ dysfunction present [facility]):   Diagnosis management comments: Known COVID pos with COPD and increased O2 requirement at home. Febrile here, not tachycardic. Sepsis bundle initiated, IVF, abx. Reviewed labs without elevated WBC or evidence of electrolyte deficiencies. Extensive discussion with hospitalist regarding admission. Admit for COVID, respiratory distress, and increased O2 requirment. Procedures  Yolis Jackson MD    Critical Care Time:       Diagnosis     Clinical Impression:   1. COVID-19 virus detected    2. On home O2    3. Chronic obstructive pulmonary disease, unspecified COPD type ([facility]    4. Sepsis, due to unspecified organism, unspecified whether acute organ dysfunction present [facility])        Disposition: Admit    Disclaimer: Sections of this note are dictated using utilizing voice recognition software. Minor typographical errors may be present. If questions arise, please do not hesitate to contact me or call our department.       I personally saw and examined the patient. I have reviewed and agree with the resident's findings, including all diagnostic interpretations, and plans as written. I was present during the key portions of separately billed procedures. Consult:  Discussed care with resident PF. Standard discussion; including history of patients chief complaint, available diagnostic results, and treatment course. Agrees with admission. Patient to be placed under Dr. Deena clark.   8:02 PM    [address] DO

## 2020-07-09 NOTE — ED NOTES
Pt repositioned in bed and placed on purewick. Pt updated on plan of care. Call bell within reach. Refill send to pharmacy

## 2020-07-10 ENCOUNTER — APPOINTMENT (OUTPATIENT)
Dept: GENERAL RADIOLOGY | Age: 60
DRG: 177 | End: 2020-07-10
Attending: STUDENT IN AN ORGANIZED HEALTH CARE EDUCATION/TRAINING PROGRAM
Payer: MEDICARE

## 2020-07-10 ENCOUNTER — APPOINTMENT (OUTPATIENT)
Dept: VASCULAR SURGERY | Age: 60
DRG: 177 | End: 2020-07-10
Attending: INTERNAL MEDICINE
Payer: MEDICARE

## 2020-07-10 LAB
ALBUMIN SERPL-MCNC: 2.9 G/DL (ref 3.4–5)
ALBUMIN/GLOB SERPL: 0.9 {RATIO} (ref 0.8–1.7)
ALP SERPL-CCNC: 100 U/L (ref 45–117)
ALT SERPL-CCNC: 77 U/L (ref 13–56)
ANION GAP SERPL CALC-SCNC: 6 MMOL/L (ref 3–18)
ANION GAP SERPL CALC-SCNC: 9 MMOL/L (ref 3–18)
APTT PPP: 34.7 SEC (ref 23–36.4)
AST SERPL-CCNC: 85 U/L (ref 10–38)
ATRIAL RATE: 86 BPM
BASOPHILS # BLD: 0 K/UL (ref 0–0.1)
BASOPHILS NFR BLD: 0 % (ref 0–2)
BILIRUB SERPL-MCNC: 0.5 MG/DL (ref 0.2–1)
BUN SERPL-MCNC: 11 MG/DL (ref 7–18)
BUN SERPL-MCNC: 12 MG/DL (ref 7–18)
BUN/CREAT SERPL: 13 (ref 12–20)
BUN/CREAT SERPL: 14 (ref 12–20)
CA-I SERPL-SCNC: 1.2 MMOL/L (ref 1.12–1.32)
CALCIUM SERPL-MCNC: 8.1 MG/DL (ref 8.5–10.1)
CALCIUM SERPL-MCNC: 8.3 MG/DL (ref 8.5–10.1)
CALCULATED P AXIS, ECG09: 35 DEGREES
CALCULATED R AXIS, ECG10: 32 DEGREES
CALCULATED T AXIS, ECG11: 38 DEGREES
CHLORIDE SERPL-SCNC: 105 MMOL/L (ref 100–111)
CHLORIDE SERPL-SCNC: 108 MMOL/L (ref 100–111)
CO2 SERPL-SCNC: 25 MMOL/L (ref 21–32)
CO2 SERPL-SCNC: 27 MMOL/L (ref 21–32)
CREAT SERPL-MCNC: 0.87 MG/DL (ref 0.6–1.3)
CREAT SERPL-MCNC: 0.88 MG/DL (ref 0.6–1.3)
D DIMER PPP FEU-MCNC: 0.3 UG/ML(FEU)
DIAGNOSIS, 93000: NORMAL
DIFFERENTIAL METHOD BLD: ABNORMAL
EOSINOPHIL # BLD: 0 K/UL (ref 0–0.4)
EOSINOPHIL NFR BLD: 0 % (ref 0–5)
ERYTHROCYTE [DISTWIDTH] IN BLOOD BY AUTOMATED COUNT: 14 % (ref 11.6–14.5)
FERRITIN SERPL-MCNC: 166 NG/ML (ref 8–388)
FIBRINOGEN PPP-MCNC: 617 MG/DL (ref 210–451)
GLOBULIN SER CALC-MCNC: 3.2 G/DL (ref 2–4)
GLUCOSE BLD STRIP.AUTO-MCNC: 147 MG/DL (ref 70–110)
GLUCOSE BLD STRIP.AUTO-MCNC: 191 MG/DL (ref 70–110)
GLUCOSE BLD STRIP.AUTO-MCNC: 195 MG/DL (ref 70–110)
GLUCOSE BLD STRIP.AUTO-MCNC: 249 MG/DL (ref 70–110)
GLUCOSE SERPL-MCNC: 178 MG/DL (ref 74–99)
GLUCOSE SERPL-MCNC: 238 MG/DL (ref 74–99)
HCT VFR BLD AUTO: 32.4 % (ref 35–45)
HGB BLD-MCNC: 10.7 G/DL (ref 12–16)
INR PPP: 1.1 (ref 0.8–1.2)
LDH SERPL L TO P-CCNC: 240 U/L (ref 81–234)
LYMPHOCYTES # BLD: 0.5 K/UL (ref 0.9–3.6)
LYMPHOCYTES NFR BLD: 11 % (ref 21–52)
MCH RBC QN AUTO: 30.7 PG (ref 24–34)
MCHC RBC AUTO-ENTMCNC: 33 G/DL (ref 31–37)
MCV RBC AUTO: 93.1 FL (ref 74–97)
MONOCYTES # BLD: 0.3 K/UL (ref 0.05–1.2)
MONOCYTES NFR BLD: 8 % (ref 3–10)
NEUTS SEG # BLD: 3.7 K/UL (ref 1.8–8)
NEUTS SEG NFR BLD: 81 % (ref 40–73)
P-R INTERVAL, ECG05: 136 MS
PLATELET # BLD AUTO: 217 K/UL (ref 135–420)
PMV BLD AUTO: 8.6 FL (ref 9.2–11.8)
POTASSIUM SERPL-SCNC: 4 MMOL/L (ref 3.5–5.5)
POTASSIUM SERPL-SCNC: 4.1 MMOL/L (ref 3.5–5.5)
PROT SERPL-MCNC: 6.1 G/DL (ref 6.4–8.2)
PROTHROMBIN TIME: 13.9 SEC (ref 11.5–15.2)
Q-T INTERVAL, ECG07: 354 MS
QRS DURATION, ECG06: 86 MS
QTC CALCULATION (BEZET), ECG08: 423 MS
RBC # BLD AUTO: 3.48 M/UL (ref 4.2–5.3)
SODIUM SERPL-SCNC: 139 MMOL/L (ref 136–145)
SODIUM SERPL-SCNC: 141 MMOL/L (ref 136–145)
VENTRICULAR RATE, ECG03: 86 BPM
WBC # BLD AUTO: 4.5 K/UL (ref 4.6–13.2)

## 2020-07-10 PROCEDURE — 74011250636 HC RX REV CODE- 250/636: Performed by: INTERNAL MEDICINE

## 2020-07-10 PROCEDURE — 86900 BLOOD TYPING SEROLOGIC ABO: CPT

## 2020-07-10 PROCEDURE — 74011000250 HC RX REV CODE- 250: Performed by: INTERNAL MEDICINE

## 2020-07-10 PROCEDURE — 71045 X-RAY EXAM CHEST 1 VIEW: CPT

## 2020-07-10 PROCEDURE — 82330 ASSAY OF CALCIUM: CPT

## 2020-07-10 PROCEDURE — 65660000000 HC RM CCU STEPDOWN

## 2020-07-10 PROCEDURE — 93970 EXTREMITY STUDY: CPT

## 2020-07-10 PROCEDURE — 74011000258 HC RX REV CODE- 258: Performed by: INTERNAL MEDICINE

## 2020-07-10 PROCEDURE — 94660 CPAP INITIATION&MGMT: CPT

## 2020-07-10 PROCEDURE — 85730 THROMBOPLASTIN TIME PARTIAL: CPT

## 2020-07-10 PROCEDURE — 82728 ASSAY OF FERRITIN: CPT

## 2020-07-10 PROCEDURE — 85025 COMPLETE CBC W/AUTO DIFF WBC: CPT

## 2020-07-10 PROCEDURE — 85610 PROTHROMBIN TIME: CPT

## 2020-07-10 PROCEDURE — 80053 COMPREHEN METABOLIC PANEL: CPT

## 2020-07-10 PROCEDURE — 36415 COLL VENOUS BLD VENIPUNCTURE: CPT

## 2020-07-10 PROCEDURE — 85384 FIBRINOGEN ACTIVITY: CPT

## 2020-07-10 PROCEDURE — 82962 GLUCOSE BLOOD TEST: CPT

## 2020-07-10 PROCEDURE — 74011636637 HC RX REV CODE- 636/637: Performed by: FAMILY MEDICINE

## 2020-07-10 PROCEDURE — 74011250637 HC RX REV CODE- 250/637: Performed by: STUDENT IN AN ORGANIZED HEALTH CARE EDUCATION/TRAINING PROGRAM

## 2020-07-10 PROCEDURE — 74011636637 HC RX REV CODE- 636/637: Performed by: STUDENT IN AN ORGANIZED HEALTH CARE EDUCATION/TRAINING PROGRAM

## 2020-07-10 PROCEDURE — 74011250636 HC RX REV CODE- 250/636: Performed by: STUDENT IN AN ORGANIZED HEALTH CARE EDUCATION/TRAINING PROGRAM

## 2020-07-10 PROCEDURE — 85379 FIBRIN DEGRADATION QUANT: CPT

## 2020-07-10 PROCEDURE — 83615 LACTATE (LD) (LDH) ENZYME: CPT

## 2020-07-10 RX ORDER — ENOXAPARIN SODIUM 150 MG/ML
1 INJECTION SUBCUTANEOUS EVERY 12 HOURS
Status: DISCONTINUED | OUTPATIENT
Start: 2020-07-10 | End: 2020-07-12

## 2020-07-10 RX ORDER — ARFORMOTEROL TARTRATE 15 UG/2ML
15 SOLUTION RESPIRATORY (INHALATION)
Status: DISCONTINUED | OUTPATIENT
Start: 2020-07-10 | End: 2020-07-14 | Stop reason: CLARIF

## 2020-07-10 RX ORDER — ARFORMOTEROL TARTRATE 15 UG/2ML
15 SOLUTION RESPIRATORY (INHALATION)
Status: DISCONTINUED | OUTPATIENT
Start: 2020-07-10 | End: 2020-07-10

## 2020-07-10 RX ORDER — BUDESONIDE 0.5 MG/2ML
500 INHALANT ORAL
Status: DISCONTINUED | OUTPATIENT
Start: 2020-07-10 | End: 2020-07-14 | Stop reason: CLARIF

## 2020-07-10 RX ORDER — DEXTROSE MONOHYDRATE 100 MG/ML
125-250 INJECTION, SOLUTION INTRAVENOUS AS NEEDED
Status: DISCONTINUED | OUTPATIENT
Start: 2020-07-10 | End: 2020-07-18 | Stop reason: HOSPADM

## 2020-07-10 RX ORDER — SODIUM CHLORIDE 9 MG/ML
250 INJECTION, SOLUTION INTRAVENOUS AS NEEDED
Status: DISCONTINUED | OUTPATIENT
Start: 2020-07-10 | End: 2020-07-14 | Stop reason: ALTCHOICE

## 2020-07-10 RX ORDER — ALBUTEROL SULFATE 90 UG/1
2 AEROSOL, METERED RESPIRATORY (INHALATION)
Status: DISCONTINUED | OUTPATIENT
Start: 2020-07-10 | End: 2020-07-18 | Stop reason: HOSPADM

## 2020-07-10 RX ADMIN — ARFORMOTEROL TARTRATE 15 MCG: 15 SOLUTION RESPIRATORY (INHALATION) at 19:51

## 2020-07-10 RX ADMIN — INSULIN GLARGINE 25 UNITS: 100 INJECTION, SOLUTION SUBCUTANEOUS at 21:34

## 2020-07-10 RX ADMIN — AZITHROMYCIN MONOHYDRATE 500 MG: 500 INJECTION, POWDER, LYOPHILIZED, FOR SOLUTION INTRAVENOUS at 18:04

## 2020-07-10 RX ADMIN — ACETAMINOPHEN 500 MG: 500 TABLET ORAL at 03:00

## 2020-07-10 RX ADMIN — Medication 500 MG: at 21:36

## 2020-07-10 RX ADMIN — PANTOPRAZOLE 40 MG: 40 TABLET, DELAYED RELEASE ORAL at 21:31

## 2020-07-10 RX ADMIN — INSULIN LISPRO 3 UNITS: 100 INJECTION, SOLUTION INTRAVENOUS; SUBCUTANEOUS at 16:49

## 2020-07-10 RX ADMIN — INSULIN LISPRO 6 UNITS: 100 INJECTION, SOLUTION INTRAVENOUS; SUBCUTANEOUS at 21:35

## 2020-07-10 RX ADMIN — ZINC SULFATE 220 MG (50 MG) CAPSULE 1 CAPSULE: CAPSULE at 09:45

## 2020-07-10 RX ADMIN — ENOXAPARIN SODIUM 40 MG: 40 INJECTION SUBCUTANEOUS at 09:45

## 2020-07-10 RX ADMIN — CHOLECALCIFEROL TAB 25 MCG (1000 UNIT) 2 TABLET: 25 TAB at 09:48

## 2020-07-10 RX ADMIN — Medication 500 MG: at 09:45

## 2020-07-10 RX ADMIN — FUROSEMIDE 20 MG: 20 TABLET ORAL at 09:44

## 2020-07-10 RX ADMIN — REMDESIVIR 200 MG: 100 INJECTION, POWDER, LYOPHILIZED, FOR SOLUTION INTRAVENOUS at 19:51

## 2020-07-10 RX ADMIN — METHYLPREDNISOLONE SODIUM SUCCINATE 40 MG: 40 INJECTION, POWDER, FOR SOLUTION INTRAMUSCULAR; INTRAVENOUS at 21:32

## 2020-07-10 RX ADMIN — ACETAMINOPHEN 500 MG: 500 TABLET ORAL at 16:49

## 2020-07-10 RX ADMIN — ENOXAPARIN SODIUM 120 MG: 120 INJECTION SUBCUTANEOUS at 21:34

## 2020-07-10 RX ADMIN — MONTELUKAST 10 MG: 10 TABLET, FILM COATED ORAL at 21:31

## 2020-07-10 RX ADMIN — LORATADINE 10 MG: 10 TABLET ORAL at 09:45

## 2020-07-10 RX ADMIN — BUDESONIDE 500 MCG: 0.5 INHALANT RESPIRATORY (INHALATION) at 19:51

## 2020-07-10 RX ADMIN — PANTOPRAZOLE 40 MG: 40 TABLET, DELAYED RELEASE ORAL at 09:48

## 2020-07-10 RX ADMIN — FLUTICASONE PROPIONATE 2 SPRAY: 50 SPRAY, METERED NASAL at 09:00

## 2020-07-10 RX ADMIN — INSULIN LISPRO 3 UNITS: 100 INJECTION, SOLUTION INTRAVENOUS; SUBCUTANEOUS at 09:46

## 2020-07-10 RX ADMIN — METHYLPREDNISOLONE SODIUM SUCCINATE 40 MG: 40 INJECTION, POWDER, FOR SOLUTION INTRAMUSCULAR; INTRAVENOUS at 13:31

## 2020-07-10 NOTE — CONSULTS
[facility]. Pulmonary, Critical Care, and Sleep Medicine    Initial Patient Consult    Name: Lissette Guerra MRN: [id number]   : 1960 Hospital: [address] [place] [address] Dr   Date: 7/10/2020        IMPRESSION:   · Acute on Chronic Hypoxic Respiratory Failure  · - 2/2 Covid19 Pneumonia  · Covid19 Pneumonia  · Severe Asthma/COPD  · - pt of ELEUTERIOSacha Pantoja  · - currently on LAMA/LABA/ICS, chronic pred 5mg, azithromycin, awaiting approval for benralizumab  · SHERRIE on trilogy machine. Patient Active Problem List   Diagnosis Code    Essential hypertension, benign I10    Diabetes mellitus, type 2 ([facility] [address] E11.9    Esophageal reflux K21.9    SHERRIE on CPAP G47.33, Z99.89    COPD (chronic obstructive pulmonary disease) ([facility]) J44.9    Allergic rhinitis J30.9    COPD with acute exacerbation ([facility]) J44.1    Obesity, Class III, BMI 40-49.9 (morbid obesity) ([facility]) E66.01    Chest pain R07.9    Dyspnea R06.00    COPD exacerbation ([facility]) J44.1    Acute exacerbation of COPD with asthma ([facility]) J44.1, B07.477    Diastolic CHF, chronic ([facility]) I50.32    Diverticulosis K57.90    COPD with acute bronchitis ([facility]) J44.0, J20.9    Hyperlipidemia E78.5    Type 2 diabetes with nephropathy ([facility]) E11.21    Bilateral carotid bruits R09.89    Palpitations R00.2    Acute exacerbation of chronic obstructive pulmonary disease (COPD) ([facility]) J44.1    Atelectasis of right lung J98.11    Hypoxia R09.02    COVID-19 U07.1    Hypokalemia E87.6      RECOMMENDATIONS:   · bipap therapy for now, may be able to de-escalate to HFNC  · Would prefer the patient be transferred to a room with a monitor and continuous pulse oximetry  · Start nebulizers- brovana and pulmicort  · IV corticosteroids  · Increase to full dose AC, some concern for PE given acute worsening and risk of clots with covid19, will order LE dopplers.    · Will consult ID for consideration of remdesivir  · NO ABX needed  · Serial inflammatory markers- ferritin, CRP  · Check procal  · Will Follow     Subjective: This patient has been seen and evaluated at the request of Dr. Toshia Malik for respiratory failure. Patient is a 60 y.o. female with PMHx of severe COPD/Asthma who follows in our clinic with Thai Mcmahan. She was diagnosed with covid19 on 7/6. She was having increased dyspnea, headache, diarrhea,  fevers so she came to the Ed. Shes normally on home O2 and triology machine. In the ED, she was placed on NC. CXR was relatively stable. Overnight and this AM she apparently became more short of breath and was placed on bipap. She has severe COPD/asthma and is currently on LAMA/LABA/ICS, azithromycin, chronic prednisone 5mg daily, trilogy machine and home o2. Benralizumab was ordered last visit but per patient not yet approved. On my assessment, she appears very anxious, diaphoretic and tachypneic. Monitor is not in the room, so vital are unclear but seems to have bounding pulse. She is endorses pleuritic chest pain and overall is having trouble breathing. Past Medical History:   Diagnosis Date    Asthma     Chronic lung disease     COPD     Cystocele, midline     Diabetes mellitus ([address]     GERD (gastroesophageal reflux disease)     Hidradenitis suppurativa     Hyperlipidemia     Hypertension     SHERRIE on CPAP     CPAP    Stress incontinence       Past Surgical History:   Procedure Laterality Date    BREAST SURGERY PROCEDURE UNLISTED      Right breast benign tumor removal    HX APPENDECTOMY      HX CHOLECYSTECTOMY      HX DILATION AND CURETTAGE      Dysfunctional uterine bleeding, thought 2/2 fibroids    HX TUBAL LIGATION        Prior to Admission medications    Medication Sig Start Date End Date Taking? Authorizing Provider   fluticasone propionate (Flovent HFA) 220 mcg/actuation inhaler Take 1 Puff by inhalation two (2) times a day.  7/7/20  Yes Evonne Kate PA-C   albuterol-ipratropium (DUO-NEB) 2.5 mg-0.5 mg/3 ml nebu 3 mL by Nebulization route every four (4) hours as needed for Wheezing. 7/7/20  Yes Evonne Kate PA-C   albuterol (PROVENTIL HFA, VENTOLIN HFA, PROAIR HFA) 90 mcg/actuation inhaler Take 2 Puffs by inhalation every four (4) hours as needed for Wheezing. 7/7/20  Yes Evonne Kate PA-C   predniSONE (DELTASONE) 5 mg tablet Take 1 Tab by mouth daily. 7/2/20  Yes Rubio Salazar MD   omeprazole (PRILOSEC) 40 mg capsule Take  by mouth. 3/9/18  Yes Provider, Historical   insulin glargine (LANTUS,BASAGLAR) 100 unit/mL (3 mL) inpn 40 Units by SubCUTAneous route nightly. Please inject 40 units every bedtime. Indications: type 2 diabetes mellitus 5/26/20  Yes Pasha Escobar MD   predniSONE (DELTASONE) 20 mg tablet Please take 60mg Prednisone(3 tabs) for 7 days, 50mg (2 1/2 tabs) for 7 days, 40mg(2 tabs) for 7 days, 30mg (1 1/2 tabs) for 7 days, 20mg (1 tabs) for 7 days, 10mg (1/2 tab) for 7 days, then take 5 mg daily from then on wards. 5/26/20  Yes Pasha Escobar MD   furosemide (Lasix) 20 mg tablet Take 20 mg by mouth daily. Yes Provider, Historical   simethicone (GAS-X) 125 mg capsule Take 125 mg by mouth four (4) times daily as needed for Flatulence. Yes Provider, Historical   loratadine (CLARITIN) 10 mg tablet Take 10 mg by mouth daily as needed for Allergies. Yes Provider, Historical   lisinopril (PRINIVIL, ZESTRIL) 20 mg tablet Take 20 mg by mouth two (2) times a day. Yes Provider, Historical   fluticasone propionate (FLONASE ALLERGY RELIEF) 50 mcg/actuation nasal spray 2 Sprays by Both Nostrils route daily. Yes Provider, Historical   fluticasone propion-salmeterol (ADVAIR HFA) 230-21 mcg/actuation inhaler Take 2 Puffs by inhalation two (2) times a day. Yes Provider, Historical   tiotropium bromide (SPIRIVA RESPIMAT) 2.5 mcg/actuation inhaler Take 2 Puffs by inhalation daily.    Yes Provider, Historical   NOVOLOG FLEXPEN U-100 INSULIN 100 unit/mL inpn Continue home Sliding scale insulin as prior to admission  Patient taking differently: 1 Units by SubCUTAneous route three (3) times daily. If BG <100=0u  101-150=5u  151-250=8u  251-300=12u  >300 call MD   7/10/18  Yes Benito Rodney MD   OXYGEN-AIR DELIVERY SYSTEMS 2 L by Nasal route continuous. First Choice   Yes Provider, Historical   aspirin delayed-release 81 mg tablet Take 81 mg by mouth daily. Yes Provider, Historical   montelukast (SINGULAIR) 10 mg tablet Take 10 mg by mouth nightly. Yes Other, MD Lauren   benzonatate (Tessalon Perles) 100 mg capsule Take 1 Cap by mouth three (3) times daily as needed for Cough for up to 7 days. 7/7/20 7/14/20  Evonne Kate PA-C   dexAMETHasone (Decadron) 6 mg tablet Take one tablet PO daily 7/6/20   Ofelia Jauregui DO   azithromycin (ZITHROMAX) 250 mg tablet Take 1 Tab by mouth every Monday, Wednesday, Friday. Monday-Friday. 7/3/20   Raulito Mcfarlane MD   insulin glargine (Basaglar KwikPen U-100 Insulin) 100 unit/mL (3 mL) inpn 45 Units by SubCUTAneous route nightly. Provider, Historical   pantoprazole (PROTONIX) 40 mg tablet Take 1 Tab by mouth daily. 5/27/20   Sierra Mello MD   lactobacillus sp. 50 billion cpu (BIO-K PLUS) 50 billion cell -375 mg cap capsule Take 1 Cap by mouth daily. 3/10/20   Sierra Mello MD   hydroCHLOROthiazide (HYDRODIURIL) 12.5 mg tablet Take 12.5 mg by mouth daily. Provider, Historical     Allergies   Allergen Reactions    Ancef [Cefazolin] Hives    Contrast Agent [Iodine] Anaphylaxis, Shortness of Breath and Swelling     Needs pre-medication for IV contrast with Benadryl, Solu-Medrol    Fish Containing Products Anaphylaxis     Pt states she had a severe allergic reaction at 9 y/o.  Statins-Hmg-Coa Reductase Inhibitors Myalgia    Metformin Other (comments)     Abdominal pain, diarrhea.     Codeine Other (comments)     Altered mental status      Social History     Tobacco Use    Smoking status: Former Smoker     Packs/day: 1.00     Years: 30.00     Pack years: 30.00 Types: Cigarettes     Start date: 1966     Last attempt to quit: 2006     Years since quittin.8    Smokeless tobacco: Former User    Tobacco comment: 1-1.5 packs per day   Substance Use Topics    Alcohol use: No      Family History   Problem Relation Age of Onset    Hypertension Mother     Stroke Mother     Breast Cancer Mother         Bilateral mastectomies    Cancer Mother         ovarian and breast    Heart Failure Mother     Heart Attack Father             Heart Surgery Father         CABG    Heart Failure Father     COPD Sister         Heavy smoker    Cancer Sister         ovarian    Heart Failure Sister     Lung Disease Sister     Asthma Child     Cancer Maternal Aunt         pancreatic    Cancer Maternal Grandfather         stomach        Current Facility-Administered Medications   Medication Dose Route Frequency    arformoteroL (BROVANA) neb solution 15 mcg  15 mcg Nebulization BID RT    budesonide (PULMICORT) 500 mcg/2 ml nebulizer suspension  500 mcg Nebulization BID RT    methylPREDNISolone (PF) (SOLU-MEDROL) injection 40 mg  40 mg IntraVENous Q6H    insulin lispro (HUMALOG) injection   SubCUTAneous AC&HS    enoxaparin (LOVENOX) injection 40 mg  40 mg SubCUTAneous Q12H    ascorbic acid (vitamin C) (VITAMIN C) tablet 500 mg  500 mg Oral BID    cholecalciferol (VITAMIN D3) (1000 Units /25 mcg) tablet 2 Tab  2,000 Units Oral DAILY    zinc sulfate (ZINCATE) 220 (50) mg capsule 1 Cap  1 Cap Oral DAILY    insulin glargine (LANTUS) injection 25 Units  25 Units SubCUTAneous QHS    furosemide (LASIX) tablet 20 mg  20 mg Oral DAILY    lisinopriL (PRINIVIL, ZESTRIL) tablet 20 mg  20 mg Oral DAILY    hydroCHLOROthiazide (HYDRODIURIL) tablet 12.5 mg  12.5 mg Oral DAILY    pantoprazole (PROTONIX) tablet 40 mg  40 mg Oral BID    loratadine (CLARITIN) tablet 10 mg  10 mg Oral DAILY    fluticasone propionate (FLONASE) 50 mcg/actuation nasal spray 2 Spray  2 Spray Both Nostrils DAILY    montelukast (SINGULAIR) tablet 10 mg  10 mg Oral QHS       Review of Systems:  Pertinent items are noted in HPI. ROS    Objective:   Vital Signs:    Visit Vitals  /68   Pulse 72   Temp 97 °F (36.1 °C)   Resp 20   Ht 5' 3\" (1.6 m)   Wt 121.6 kg (268 lb)   SpO2 97%   BMI 47.47 kg/m²       O2 Device: BIPAP   O2 Flow Rate (L/min): 4 l/min   Temp (24hrs), Av.5 °F (37.5 °C), Min:97 °F (36.1 °C), Max:102.7 °F (39.3 °C)       Intake/Output:   Last shift:      No intake/output data recorded. Last 3 shifts: No intake/output data recorded. No intake or output data in the 24 hours ending 07/10/20 1404   Physical Exam:   General:  Alert, cooperative, no distress, appears stated age. Head:  Normocephalic, without obvious abnormality, atraumatic. Lungs:   Bilateral auscultation decreased air movement, no rales or wheezes   Chest wall:  No tenderness or deformity. NO CREPITUS   Heart:  Regular rate and rhythm, S1, S2 normal, no murmur, click, rub or gallop. Abdomen:   Soft, non-tender. Bowel sounds normal. No masses,  No organomegaly. No paradox   Extremities: normal, atraumatic, no cyanosis or edema.    Neurologic: Grossly nonfocal          Data review:   Labs:  Recent Results (from the past 24 hour(s))   EKG, 12 LEAD, INITIAL    Collection Time: 20  3:17 PM   Result Value Ref Range    Ventricular Rate 86 BPM    Atrial Rate 86 BPM    P-R Interval 136 ms    QRS Duration 86 ms    Q-T Interval 354 ms    QTC Calculation (Bezet) 423 ms    Calculated P Axis 35 degrees    Calculated R Axis 32 degrees    Calculated T Axis 38 degrees    Diagnosis       Normal sinus rhythm  Normal ECG  When compared with ECG of 2020 16:09,  No significant change was found  Confirmed by Dwayne Llamas MD, Iraj Expose ([id number]) on 7/10/2020 1:06:31 PM     CULTURE, BLOOD    Collection Time: 20  3:43 PM    Specimen: Blood   Result Value Ref Range    Special Requests: NO SPECIAL REQUESTS      Culture result: NO GROWTH AFTER 14 HOURS     CBC WITH AUTOMATED DIFF    Collection Time: 07/09/20  3:53 PM   Result Value Ref Range    WBC 5.8 4.6 - 13.2 K/uL    RBC 3.76 (L) 4.20 - 5.30 M/uL    HGB 11.5 (L) 12.0 - 16.0 g/dL    HCT 34.6 (L) 35.0 - 45.0 %    MCV 92.0 74.0 - 97.0 FL    MCH 30.6 24.0 - 34.0 PG    MCHC 33.2 31.0 - 37.0 g/dL    RDW 13.9 11.6 - 14.5 %    PLATELET 224 750 - 700 K/uL    MPV 9.1 (L) 9.2 - 11.8 FL    NEUTROPHILS 73 40 - 73 %    LYMPHOCYTES 18 (L) 21 - 52 %    MONOCYTES 9 3 - 10 %    EOSINOPHILS 0 0 - 5 %    BASOPHILS 0 0 - 2 %    ABS. NEUTROPHILS 4.2 1.8 - 8.0 K/UL    ABS. LYMPHOCYTES 1.0 0.9 - 3.6 K/UL    ABS. MONOCYTES 0.5 0.05 - 1.2 K/UL    ABS. EOSINOPHILS 0.0 0.0 - 0.4 K/UL    ABS. BASOPHILS 0.0 0.0 - 0.1 K/UL    DF AUTOMATED     LACTIC ACID    Collection Time: 07/09/20  3:53 PM   Result Value Ref Range    Lactic acid 1.2 0.4 - 2.0 MMOL/L   NT-PRO BNP    Collection Time: 07/09/20  3:53 PM   Result Value Ref Range    NT pro- 0 - 900 PG/ML   TROPONIN I    Collection Time: 07/09/20  3:53 PM   Result Value Ref Range    Troponin-I, QT <0.02 0.0 - 8.928 NG/ML   METABOLIC PANEL, COMPREHENSIVE    Collection Time: 07/09/20  3:53 PM   Result Value Ref Range    Sodium 138 136 - 145 mmol/L    Potassium 3.5 3.5 - 5.5 mmol/L    Chloride 104 100 - 111 mmol/L    CO2 30 21 - 32 mmol/L    Anion gap 4 3.0 - 18 mmol/L    Glucose 171 (H) 74 - 99 mg/dL    BUN 13 7.0 - 18 MG/DL    Creatinine 0.92 0.6 - 1.3 MG/DL    BUN/Creatinine ratio 14 12 - 20      GFR est AA >60 >60 ml/min/1.73m2    GFR est non-AA >60 >60 ml/min/1.73m2    Calcium 8.7 8.5 - 10.1 MG/DL    Bilirubin, total 0.7 0.2 - 1.0 MG/DL    ALT (SGPT) 62 (H) 13 - 56 U/L    AST (SGOT) 39 (H) 10 - 38 U/L    Alk.  phosphatase 73 45 - 117 U/L    Protein, total 6.7 6.4 - 8.2 g/dL    Albumin 3.1 (L) 3.4 - 5.0 g/dL    Globulin 3.6 2.0 - 4.0 g/dL    A-G Ratio 0.9 0.8 - 1.7     CULTURE, BLOOD    Collection Time: 07/09/20  3:53 PM    Specimen: Blood   Result Value Ref Range Special Requests: NO SPECIAL REQUESTS      Culture result: NO GROWTH AFTER 14 HOURS     PROTHROMBIN TIME + INR    Collection Time: 07/09/20  3:53 PM   Result Value Ref Range    Prothrombin time 13.4 11.5 - 15.2 sec    INR 1.0 0.8 - 1.2     PTT    Collection Time: 07/09/20  3:53 PM   Result Value Ref Range    aPTT 30.8 23.0 - 36.4 SEC   D DIMER    Collection Time: 07/09/20  3:53 PM   Result Value Ref Range    D DIMER <0.27 <0.46 ug/ml(FEU)   FERRITIN    Collection Time: 07/09/20  3:53 PM   Result Value Ref Range    Ferritin 150 8 - 388 NG/ML   C REACTIVE PROTEIN, QT    Collection Time: 07/09/20  3:53 PM   Result Value Ref Range    C-Reactive protein 12.9 (H) 0 - 0.3 mg/dL   SED RATE (ESR)    Collection Time: 07/09/20  3:53 PM   Result Value Ref Range    Sed rate, automated 39 (H) 0 - 30 mm/hr   LD    Collection Time: 07/09/20  3:53 PM   Result Value Ref Range     (H) 81 - 234 U/L   POC LACTIC ACID    Collection Time: 07/09/20  4:01 PM   Result Value Ref Range    Lactic Acid (POC) 0.98 0.40 - 2.00 mmol/L   URINALYSIS W/ RFLX MICROSCOPIC    Collection Time: 07/09/20  7:16 PM   Result Value Ref Range    Color YELLOW      Appearance CLEAR      Specific gravity 1.011 1.005 - 1.030      pH (UA) 5.5 5.0 - 8.0      Protein Negative NEG mg/dL    Glucose 100 (A) NEG mg/dL    Ketone Negative NEG mg/dL    Bilirubin Negative NEG      Blood Negative NEG      Urobilinogen 1.0 0.2 - 1.0 EU/dL    Nitrites Negative NEG      Leukocyte Esterase TRACE (A) NEG     URINE MICROSCOPIC ONLY    Collection Time: 07/09/20  7:16 PM   Result Value Ref Range    WBC 0 to 3 0 - 5 /hpf    Epithelial cells 2+ 0 - 5 /lpf    Bacteria 1+ (A) NEG /hpf    Mucus FEW (A) NEG /lpf   GLUCOSE, POC    Collection Time: 07/09/20 10:57 PM   Result Value Ref Range    Glucose (POC) 285 (H) 70 - 110 mg/dL   POC G3    Collection Time: 07/09/20 11:21 PM   Result Value Ref Range    Device: NASAL CANNULA      Flow rate (POC) 3 L/M    pH (POC) 7.36 7.35 - 7.45 pCO2 (POC) 43.2 35.0 - 45.0 MMHG    pO2 (POC) 209 (H) 80 - 100 MMHG    HCO3 (POC) 24.1 22 - 26 MMOL/L    sO2 (POC) 100 (H) 92 - 97 %    Base deficit (POC) 1 mmol/L    Allens test (POC) YES      Site RIGHT RADIAL      Specimen type (POC) ARTERIAL      Performed by [id number]    Collection Time: 07/10/20  3:39 AM   Result Value Ref Range    Sodium 141 136 - 145 mmol/L    Potassium 4.1 3.5 - 5.5 mmol/L    Chloride 108 100 - 111 mmol/L    CO2 27 21 - 32 mmol/L    Anion gap 6 3.0 - 18 mmol/L    Glucose 238 (H) 74 - 99 mg/dL    BUN 11 7.0 - 18 MG/DL    Creatinine 0.87 0.6 - 1.3 MG/DL    BUN/Creatinine ratio 13 12 - 20      GFR est AA >60 >60 ml/min/1.73m2    GFR est non-AA >60 >60 ml/min/1.73m2    Calcium 8.1 (L) 8.5 - 10.1 MG/DL   CBC WITH AUTOMATED DIFF    Collection Time: 07/10/20  3:39 AM   Result Value Ref Range    WBC 4.5 (L) 4.6 - 13.2 K/uL    RBC 3.48 (L) 4.20 - 5.30 M/uL    HGB 10.7 (L) 12.0 - 16.0 g/dL    HCT 32.4 (L) 35.0 - 45.0 %    MCV 93.1 74.0 - 97.0 FL    MCH 30.7 24.0 - 34.0 PG    MCHC 33.0 31.0 - 37.0 g/dL    RDW 14.0 11.6 - 14.5 %    PLATELET 225 751 - 617 K/uL    MPV 8.6 (L) 9.2 - 11.8 FL    NEUTROPHILS 81 (H) 40 - 73 %    LYMPHOCYTES 11 (L) 21 - 52 %    MONOCYTES 8 3 - 10 %    EOSINOPHILS 0 0 - 5 %    BASOPHILS 0 0 - 2 %    ABS. NEUTROPHILS 3.7 1.8 - 8.0 K/UL    ABS. LYMPHOCYTES 0.5 (L) 0.9 - 3.6 K/UL    ABS. MONOCYTES 0.3 0.05 - 1.2 K/UL    ABS. EOSINOPHILS 0.0 0.0 - 0.4 K/UL    ABS.  BASOPHILS 0.0 0.0 - 0.1 K/UL    DF AUTOMATED     PROTHROMBIN TIME + INR    Collection Time: 07/10/20  3:39 AM   Result Value Ref Range    Prothrombin time 13.9 11.5 - 15.2 sec    INR 1.1 0.8 - 1.2     PTT    Collection Time: 07/10/20  3:39 AM   Result Value Ref Range    aPTT 34.7 23.0 - 36.4 SEC   FIBRINOGEN    Collection Time: 07/10/20  3:39 AM   Result Value Ref Range    Fibrinogen 617 (H) 210 - 451 mg/dL   LD    Collection Time: 07/10/20  3:39 AM   Result Value Ref Range     (H) 81 - 234 U/L   FERRITIN    Collection Time: 07/10/20  3:39 AM   Result Value Ref Range    Ferritin 166 8 - 388 NG/ML   D DIMER    Collection Time: 07/10/20  3:39 AM   Result Value Ref Range    D DIMER 0.30 <0.46 ug/ml(FEU)   GLUCOSE, POC    Collection Time: 07/10/20  8:37 AM   Result Value Ref Range    Glucose (POC) 191 (H) 70 - 110 mg/dL   CALCIUM, IONIZED    Collection Time: 07/10/20 10:18 AM   Result Value Ref Range    Ionized Calcium 1.20 1. 12 - 1.32 MMOL/L   GLUCOSE, POC    Collection Time: 07/10/20  1:15 PM   Result Value Ref Range    Glucose (POC) 147 (H) 70 - 110 mg/dL       Imaging:  I have personally reviewed the patients radiographs and have reviewed the reports:  CXR Results  (Last 48 hours)               07/10/20 0557  XR CHEST PORT Final result    Impression:  IMPRESSION: Slight improvement in basilar aeration. Narrative:  EXAM: XR CHEST PORT       INDICATION: resp distress, covid       COMPARISON: 7/9/2020       FINDINGS: A portable AP radiograph of the chest was obtained at 0522 hours. Bones are unremarkable. The heart is moderately enlarged. Basilar atelectasis   noted bilaterally. Minimal infiltrates cannot be excluded. Increased pulmonary   vascular congestion evident bilaterally. There is no pneumothorax. 07/09/20 1459  XR CHEST PORT Final result    Impression:  IMPRESSION: Decreased inspiration since the prior day, otherwise no change       Narrative:  EXAM: XR CHEST PORT       INDICATION: COVID with new O2 req       COMPARISON: 7/7/2020       FINDINGS: A portable AP radiograph of the chest was obtained at 1424 hours. There is mild pulmonary vascular congestion. Slight decrease in inspiration   since the previous day. Slight atelectasis in the retrocardiac area.                CT Results  (Last 48 hours)    None            High complexity decision making was performed during the evaluation of this patient at high risk for decompensation with multiple organ involvement     Above mentioned total time spent on reviewing the case/medical record/data/notes/EMR/patient examination/documentation/coordinating care with nurse/consultants, exclusive of procedures with complex decision making performed and > 50% time spent in face to face evaluation.      Sunday Duran MD

## 2020-07-10 NOTE — CDMP QUERY
Patient admitted with COVID-19 and noted to have a clinical impression of sepsis by ER. If possible, please document in progress notes and discharge summary if you are evaluating and/or treating:  Sepsis due to COVID-19 
 COVID-19 without sepsis  Other, please specify  Unable to clinically determine The medical record reflects the following: 
  Risk Factors: Acute illness Clinical Indicators: ER clinical impression: Sepsis; COVID 19+; acute on chronic hypoxic respiratory failure T 102.7; HR 90; RR 20-27 Treatment: Vanco and Rocephin in ER; Blood cultures Thank you, Tommy Cervantes RN/CCDS 
[phone]

## 2020-07-10 NOTE — PROGRESS NOTES
CDMP Query    Patient admitted with COVID-19 and noted to have a clinical impression of sepsis by ER.   If possible, please document in progress notes and discharge summary if you are evaluating and/or treating:                Sepsis due to COVID-19             COVID-19 without sepsis             Other, please specify             Unable to clinically determine     The medical record reflects the following:    Risk Factors: Acute illness    Clinical Indicators: ER clinical impression: Sepsis; COVID 19+; acute on chronic hypoxic respiratory failure  T 102.7; HR 90; RR 20-27    Treatment: Vanco and Rocephin in ER; Blood cultures     Answer: Being treated for Covid Pneumonia      [address]

## 2020-07-10 NOTE — PROGRESS NOTES
Reason for Admission:   COVID-19 [U07.1]  Hypoxia [R09.02]  COVID-19 [U07.1]  Hypokalemia [E87.6]               RUR Score:    64%             Resources/supports as identified by patient/family:   Patient lives with daughter, Edgardo Back. .    Top Challenges facing patient (as identified by patient/family and CM): Finances/Medication cost?     No concerns voiced at this time. Transportation     Daughter will provide transportation. Support system or lack thereof? Family are support system. Living arrangements? Lives with daughter. Self-care/ADLs/Cognition? Needs assistance/Alert and oriented       Current Advanced Directive/Advance Care Plan:   no but has decision maker on file. Plan for utilizing home health: To be determined                      Likelihood of readmission:   HIGH    Transition of Care Plan:                    Initial assessment completed with patient. Cognitive status of patient: oriented to time, place, person and situation. Face sheet information confirmed:  yes. The patient designates Edgardo Back, daughter ([phone]) to participate in her discharge plan and to receive any needed information. This patient lives in a single family home with daughter. Patient is not able to navigate steps as needed. Prior to hospitalization, patient was considered to be independent with ADLs/IADLS : no . If not independent,  patient needs assist with : dressing, bathing, food preparation, toileting and grooming. Patient has personal care aide but don't now name. Patient has a current ACP document on file: no. Has decision maker. The patient and daughter will be available to transport patient home upon discharge. The patient already has Walker, BSC, triology, and home oxygen ( 2 LNC with [employer]. ) medical equipment available in the home. Patient is not currently active with home health.  .  Patient has not stayed in a skilled nursing facility or rehab. This patient is on dialysis :no    Freedom of choice signed: no     . Currently, the discharge plan is home with family assistance vs home with home health  Patient would benefit from PT/OT evaluation. CM will continue to monitor for transitional needs for a safe discharge. .    The patient states that she can obtain her medications from the pharmacy, and take her medications as directed. Patient's current insurance is Medicare Part A & B and [facility] medicaid/[facility] medicaid. Care Management Interventions  PCP Verified by CM: Yes(Per patient, saw pcp July 2020.)  Mode of Transport at Discharge: Self  Transition of Care Consult (CM Consult): Discharge Planning  Discharge Durable Medical Equipment: No  Physical Therapy Consult: No  Occupational Therapy Consult: No  Speech Therapy Consult: No  Current Support Network: Lives with Caregiver  Confirm Follow Up Transport: Family  Discharge Location  Discharge Placement: (home with family assistance vs home with home health)      ANA M Murphy, RN  Pager # [phone]  Care Manager

## 2020-07-10 NOTE — PROGRESS NOTES
Received admission report from [address]. Problem: Falls - Risk of  Goal: *Absence of Falls  Description: Document Metz Aleks Fall Risk and appropriate interventions in the flowsheet. Outcome: Progressing Towards Goal  Note: Fall Risk Interventions:  Mobility Interventions: Patient to call before getting OOB         Medication Interventions: Patient to call before getting OOB     Gave verbal report to [place], using SBAR, MAR, and Kardex.

## 2020-07-10 NOTE — CONSULTS
Infectious Disease Consultation Note        Reason: Acute hypoxic respiratory failure, confirmed COVID-19 pneumonia    Current abx Prior abx   Ceftriaxone, vancomycin 7/9/2020      Lines:       Assessment :      60 y.o. female with PMH of COPD on home O2, IDDM2 (last hemoglobin A1c 8.4 on 5/22/2020), HTN, HFpEF, SHERRIE on CPAP, GERD, allergic rhinitis, presented to ED on 7/9/2020 with fever, sob and cough w/ known covid+.      Chest x-ray 7/9/20: Bibasilar infiltrates     Clinical presentation consistent with acute hypoxic respiratory failure secondary to COVID-19 pneumonia superimposed on underlying COPD    Labs on 7/10-ferritin 166, , CRP 12.9, d-dimer 0.3    Underlying severe COPD, uncontrolled type 2 diabetes puts patient at high risk of clinical deterioration. Hence will use aggressive treatment for covert infection. Discussed with Dr. Molian Winkler. Agrees with this. Recommendations:    1. Start azithromycin, remdesivir -risk and benefits discussed with the patient. She agrees with using this. Monitor LFTs daily  2. Recommend convalescent plasma-ordered, consent placed in the chart  3. Follow pulmonary recommendations  4. Continue adjunctive therapy for COVID-19    Thank you for consultation request. Above plan was discussed in details with patient, dr. Molina Winkler and dr Georgina Chase. Please call me if any further questions or concerns. Will continue to participate in the care of this patient. HPI:    60 y.o. female with PMH of COPD on home O2, IDDM2 (last hemoglobin A1c 8.4 on 5/22/2020), HTN, HFpEF, SHERRIE on CPAP, GERD, allergic rhinitis, presented to ED on 7/9/2020 with fever, sob and cough w/ known covid+.      Headache and non-bloody diarrhea starting sunday. Developed cough on Monday and has been progressively more sob this week. She has congestion and feels her chest is congested but cant get any sputum out.  Multiple family members with confirmed covid (son, granddaughter) and a daughter with symptoms and pending covid. She has had exposure to these patients. Been to ED twice in the past week: 07/06, 07/07. Covid swab 7/6 positive. For about 24 hours before admission, she has been using her trilogy machine ~14 hours. Her home oxygen increased to 4L. Not able to eat or take all her oral meds in last week. She presented to ED with these complaints. She was noted to have temperature of 102.7. She was placed on BiPAP. She was started on IV steroids. They are unable to wean her off the BiPAP. I have been consulted for further recommendations. Currently patient is on BiPAP. She is experiencing increasing shortness of breath. Unable to communicate effectively due to BiPAP. Detailed review of systems not feasible  Past Medical History:   Diagnosis Date    Asthma     Chronic lung disease     COPD     Cystocele, midline     Diabetes mellitus ([address]     GERD (gastroesophageal reflux disease)     Hidradenitis suppurativa     Hyperlipidemia     Hypertension     SHERRIE on CPAP     CPAP    Stress incontinence        Past Surgical History:   Procedure Laterality Date    BREAST SURGERY PROCEDURE UNLISTED      Right breast benign tumor removal    HX APPENDECTOMY      HX CHOLECYSTECTOMY      HX DILATION AND CURETTAGE      Dysfunctional uterine bleeding, thought 2/2 fibroids    HX TUBAL LIGATION         home Medication List    Details   fluticasone propionate (Flovent HFA) 220 mcg/actuation inhaler Take 1 Puff by inhalation two (2) times a day. Qty: 1 Inhaler, Refills: 0      albuterol-ipratropium (DUO-NEB) 2.5 mg-0.5 mg/3 ml nebu 3 mL by Nebulization route every four (4) hours as needed for Wheezing. Qty: 30 Nebule, Refills: 0      albuterol (PROVENTIL HFA, VENTOLIN HFA, PROAIR HFA) 90 mcg/actuation inhaler Take 2 Puffs by inhalation every four (4) hours as needed for Wheezing. Qty: 1 Inhaler, Refills: 0      !! predniSONE (DELTASONE) 5 mg tablet Take 1 Tab by mouth daily.   Qty: 60 Tab, Refills: 1    Associated Diagnoses: Asthma-COPD overlap syndrome ([facility] Poorly controlled severe persistent asthma with acute exacerbation; Chronic respiratory failure with hypoxia and hypercapnia (HCC); COPD, group D, by GOLD 2017 classification ([facility] Morbid obesity ([facility] Obesity hypoventilation syndrome ([facility] SHERRIE (obstructive sleep apnea); Current chronic use of systemic steroids; Chronic rhinitis; Dyspnea on exertion; Shortness of breath; Personal history of tobacco use, presenting hazards to health      omeprazole (PRILOSEC) 40 mg capsule Take  by mouth. !! insulin glargine (LANTUS,BASAGLAR) 100 unit/mL (3 mL) inpn 40 Units by SubCUTAneous route nightly. Please inject 40 units every bedtime. Indications: type 2 diabetes mellitus  Qty: 28 Units, Refills: 0      !! predniSONE (DELTASONE) 20 mg tablet Please take 60mg Prednisone(3 tabs) for 7 days, 50mg (2 1/2 tabs) for 7 days, 40mg(2 tabs) for 7 days, 30mg (1 1/2 tabs) for 7 days, 20mg (1 tabs) for 7 days, 10mg (1/2 tab) for 7 days, then take 5 mg daily from then on wards. Qty: 80 Tab, Refills: 0      furosemide (Lasix) 20 mg tablet Take 20 mg by mouth daily. simethicone (GAS-X) 125 mg capsule Take 125 mg by mouth four (4) times daily as needed for Flatulence. loratadine (CLARITIN) 10 mg tablet Take 10 mg by mouth daily as needed for Allergies. lisinopril (PRINIVIL, ZESTRIL) 20 mg tablet Take 20 mg by mouth two (2) times a day. fluticasone propionate (FLONASE ALLERGY RELIEF) 50 mcg/actuation nasal spray 2 Sprays by Both Nostrils route daily. fluticasone propion-salmeterol (ADVAIR HFA) 230-21 mcg/actuation inhaler Take 2 Puffs by inhalation two (2) times a day. tiotropium bromide (SPIRIVA RESPIMAT) 2.5 mcg/actuation inhaler Take 2 Puffs by inhalation daily.       NOVOLOG FLEXPEN U-100 INSULIN 100 unit/mL inpn Continue home Sliding scale insulin as prior to admission  Qty: 1 Pen, Refills: 0      OXYGEN-AIR DELIVERY SYSTEMS 2 L by Nasal route continuous. [facility] [employer]      aspirin delayed-release 81 mg tablet Take 81 mg by mouth daily. montelukast (SINGULAIR) 10 mg tablet Take 10 mg by mouth nightly. benzonatate (Tessalon Perles) 100 mg capsule Take 1 Cap by mouth three (3) times daily as needed for Cough for up to 7 days. Qty: 30 Cap, Refills: 0      dexAMETHasone (Decadron) 6 mg tablet Take one tablet PO daily  Qty: 5 Tab, Refills: 0      azithromycin (ZITHROMAX) 250 mg tablet Take 1 Tab by mouth every Monday, Wednesday, Friday. Monday-Friday. Qty: 30 Tab, Refills: 0    Associated Diagnoses: COPD, group D, by GOLD 2017 classification ([facility]      ! ! insulin glargine (Basaglar KwikPen U-100 Insulin) 100 unit/mL (3 mL) inpn 45 Units by SubCUTAneous route nightly. pantoprazole (PROTONIX) 40 mg tablet Take 1 Tab by mouth daily. Qty: 30 Tab, Refills: 0      lactobacillus sp. 50 billion cpu (BIO-K PLUS) 50 billion cell -375 mg cap capsule Take 1 Cap by mouth daily. Qty: 30 Cap, Refills: 0      hydroCHLOROthiazide (HYDRODIURIL) 12.5 mg tablet Take 12.5 mg by mouth daily. !! - Potential duplicate medications found. Please discuss with provider.           Current Facility-Administered Medications   Medication Dose Route Frequency    dextrose 10% infusion 125-250 mL  125-250 mL IntraVENous PRN    albuterol (PROVENTIL HFA, VENTOLIN HFA, PROAIR HFA) inhaler 2 Puff  2 Puff Inhalation Q4H PRN    arformoteroL (BROVANA) neb solution 15 mcg  15 mcg Nebulization BID RT    budesonide (PULMICORT) 500 mcg/2 ml nebulizer suspension  500 mcg Nebulization BID RT    methylPREDNISolone (PF) (SOLU-MEDROL) injection 40 mg  40 mg IntraVENous Q6H    enoxaparin (LOVENOX) injection 120 mg  1 mg/kg SubCUTAneous Q12H    sodium chloride (NS) flush 5-10 mL  5-10 mL IntraVENous PRN    insulin lispro (HUMALOG) injection   SubCUTAneous AC&HS    glucose chewable tablet 16 g  4 Tab Oral PRN    glucagon (GLUCAGEN) injection 1 mg  1 mg IntraMUSCular PRN    albuterol-ipratropium (DUO-NEB) 2.5 MG-0.5 MG/3 ML  3 mL Nebulization Q4H PRN    albuterol (ACCUNEB) nebulizer solution 1.25 mg  1.25 mg Nebulization Q4H PRN    acetaminophen (TYLENOL) tablet 500 mg  500 mg Oral Q6H PRN    Or    acetaminophen (TYLENOL) suppository 500 mg  500 mg Rectal Q6H PRN    ascorbic acid (vitamin C) (VITAMIN C) tablet 500 mg  500 mg Oral BID    cholecalciferol (VITAMIN D3) (1000 Units /25 mcg) tablet 2 Tab  2,000 Units Oral DAILY    zinc sulfate (ZINCATE) 220 (50) mg capsule 1 Cap  1 Cap Oral DAILY    insulin glargine (LANTUS) injection 25 Units  25 Units SubCUTAneous QHS    furosemide (LASIX) tablet 20 mg  20 mg Oral DAILY    lisinopriL (PRINIVIL, ZESTRIL) tablet 20 mg  20 mg Oral DAILY    hydroCHLOROthiazide (HYDRODIURIL) tablet 12.5 mg  12.5 mg Oral DAILY    pantoprazole (PROTONIX) tablet 40 mg  40 mg Oral BID    loratadine (CLARITIN) tablet 10 mg  10 mg Oral DAILY    fluticasone propionate (FLONASE) 50 mcg/actuation nasal spray 2 Spray  2 Spray Both Nostrils DAILY    montelukast (SINGULAIR) tablet 10 mg  10 mg Oral QHS       Allergies: Ancef [cefazolin]; Contrast agent [iodine];  Fish containing products; Statins-hmg-coa reductase inhibitors; Metformin; and Codeine    Family History   Problem Relation Age of Onset    Hypertension Mother     Stroke Mother     Breast Cancer Mother         Bilateral mastectomies    Cancer Mother         ovarian and breast    Heart Failure Mother     Heart Attack Father         2011    Heart Surgery Father         CABG    Heart Failure Father     COPD Sister         Heavy smoker    Cancer Sister         ovarian    Heart Failure Sister     Lung Disease Sister     Asthma Child     Cancer Maternal Aunt         pancreatic    Cancer Maternal Grandfather         stomach     Social History     Socioeconomic History    Marital status: LEGALLY      Spouse name: Not on file    Number of children: Not on file    Years of education: Not on file    Highest education level: Not on file   Occupational History    Not on file   Social Needs    Financial resource strain: Not on file    Food insecurity     Worry: Not on file     Inability: Not on file    Transportation needs     Medical: Not on file     Non-medical: Not on file   Tobacco Use    Smoking status: Former Smoker     Packs/day: 1.00     Years: 30.00     Pack years: 30.00     Types: Cigarettes     Start date: 1966     Last attempt to quit: 2006     Years since quittin.8    Smokeless tobacco: Former User    Tobacco comment: 1-1.5 packs per day   Substance and Sexual Activity    Alcohol use: No    Drug use: No    Sexual activity: Not Currently   Lifestyle    Physical activity     Days per week: Not on file     Minutes per session: Not on file    Stress: Not on file   Relationships    Social connections     Talks on phone: Not on file     Gets together: Not on file     Attends Rastafari service: Not on file     Active member of club or organization: Not on file     Attends meetings of clubs or organizations: Not on file     Relationship status: Not on file    Intimate partner violence     Fear of current or ex partner: Not on file     Emotionally abused: Not on file     Physically abused: Not on file     Forced sexual activity: Not on file   Other Topics Concern    Not on file   Social History Narrative    Not on file     Social History     Tobacco Use   Smoking Status Former Smoker    Packs/day: 1.00    Years: 30.00    Pack years: 30.00    Types: Cigarettes    Start date: 1966    Last attempt to quit: 2006    Years since quittin.8   Smokeless Tobacco Former User   Tobacco Comment    1-1.5 packs per day        Temp (24hrs), Av.7 °F (37.1 °C), Min:97 °F (36.1 °C), Max:99.6 °F (37.6 °C)    Visit Vitals  /90   Pulse 86   Temp 99.4 °F (37.4 °C)   Resp 20   Ht 5' 3\" (1.6 m)   Wt 121.6 kg (268 lb)   SpO2 99%   BMI 47.47 kg/m² ROS: Unable to obtain detailed review of system  Physical Exam:    General:  Alert, sitting on the bed, on BiPAP. Head:  Normocephalic, without obvious abnormality, atraumatic. Lungs:   Bilateral auscultation decreased air movement, no rales or wheezes   Chest wall:  No tenderness or deformity. NO CREPITUS   Heart:  Regular rate and rhythm, S1, S2 normal   Abdomen:   Soft, non-tender. Bowel sounds normal. No masses,  No organomegaly. No paradox   Extremities: normal, atraumatic, no cyanosis or edema.    Neurologic: Grossly nonfocal  Psychiatry: appropriate mood and affect         Labs: Results:   Chemistry Recent Labs     07/10/20  0339 07/09/20  1553 07/07/20  1615   * 171* 179*    138 136   K 4.1 3.5 3.7    104 102   CO2 27 30 28   BUN 11 13 9   CREA 0.87 0.92 0.91   CA 8.1* 8.7 9.4   AGAP 6 4 6   BUCR 13 14 10*   AP  --  73 76   TP  --  6.7 7.3   ALB  --  3.1* 3.4   GLOB  --  3.6 3.9   AGRAT  --  0.9 0.9      CBC w/Diff Recent Labs     07/10/20  0339 07/09/20  1553 07/07/20  1615   WBC 4.5* 5.8 6.5   RBC 3.48* 3.76* 3.94*   HGB 10.7* 11.5* 12.2   HCT 32.4* 34.6* 35.9    241 219   GRANS 81* 73 72   LYMPH 11* 18* 17*   EOS 0 0 0      Microbiology Recent Labs     07/09/20  1553 07/09/20  1543 07/07/20  2030 07/07/20  1800 07/07/20  1745   CULT NO GROWTH AFTER 14 HOURS NO GROWTH AFTER 14 HOURS MIXED UROGENITAL TOAN ISOLATED NO GROWTH 3 DAYS NO GROWTH 3 DAYS          RADIOLOGY:    All available imaging studies/reports in [facility] for this admission were reviewed    Dr. Ambika Santoro, Infectious Disease Specialist  [phone]  July 10, 2020  2:51 PM

## 2020-07-10 NOTE — PROGRESS NOTES
Intern Progress Note  [facility]       Patient: Latonya Etienne MRN: [id number]  CSN: [id number]    YOB: 1960  Age: 60 y.o. Sex: female    DOA: 7/9/2020 LOS:  LOS: 1 day                    Subjective:     Acute events: None    PFM called pt on her room phone this AM  Pt stated that she felt the same as yesterday,  hasnt eaten since admission. Will attempt to eat some jello this AM.     ROS - +chills, congestion, cough, sob, chest discomfort, no appetite, +myalgias,            - No abdo pain     Objective:      Patient Vitals for the past 24 hrs:   Temp Pulse Resp BP SpO2   07/10/20 0425 97 °F (36.1 °C) 72 20 127/68 97 %   07/10/20 0152 -- -- -- -- 98 %   07/10/20 0030 98.7 °F (37.1 °C) 76 22 170/86 94 %   07/09/20 1800 -- 85 24 160/68 97 %   07/09/20 1615 99.6 °F (37.6 °C) 86 27 148/60 97 %   07/09/20 1411 (!) 102.7 °F (39.3 °C) 92 20 153/83 95 %       No intake or output data in the 24 hours ending 07/10/20 1477    Physical Exam (per Dr. Rossi Holstein):  General:  Pale, fatigued,  in no acute distress. CV:  RRR, no murmurs/rubs/gallops. No visible pulsations or thrills. Resp: on bipap, unlabored, Lungs clear to auscultation. No wheeze, rales, or rhonchi. Poor airflo. Abd:  Soft, nontender, obese. No suprapubic tenderness. No CVA tenderness. Umbilical hernia. Ext:  1+ pitting edema to knees. 2+ radial and dp pulses bilaterally.    Skin:  Skin warm     Lab/Data Reviewed:  BMP:   Lab Results   Component Value Date/Time     07/10/2020 03:39 AM    K 4.1 07/10/2020 03:39 AM     07/10/2020 03:39 AM    CO2 27 07/10/2020 03:39 AM    AGAP 6 07/10/2020 03:39 AM     (H) 07/10/2020 03:39 AM    BUN 11 07/10/2020 03:39 AM    CREA 0.87 07/10/2020 03:39 AM    GFRAA >60 07/10/2020 03:39 AM    GFRNA >60 07/10/2020 03:39 AM     CBC:   Lab Results   Component Value Date/Time    WBC 4.5 (L) 07/10/2020 03:39 AM    HGB 10.7 (L) 07/10/2020 03:39 AM    HCT 32.4 (L) 07/10/2020 03:39 AM     07/10/2020 03:39 AM        Scheduled Medications Reviewed:  Current Facility-Administered Medications   Medication Dose Route Frequency    insulin lispro (HUMALOG) injection   SubCUTAneous AC&HS    enoxaparin (LOVENOX) injection 40 mg  40 mg SubCUTAneous Q12H    ascorbic acid (vitamin C) (VITAMIN C) tablet 500 mg  500 mg Oral BID    cholecalciferol (VITAMIN D3) (1000 Units /25 mcg) tablet 2 Tab  2,000 Units Oral DAILY    zinc sulfate (ZINCATE) 220 (50) mg capsule 1 Cap  1 Cap Oral DAILY    insulin glargine (LANTUS) injection 25 Units  25 Units SubCUTAneous QHS    furosemide (LASIX) tablet 20 mg  20 mg Oral DAILY    lisinopriL (PRINIVIL, ZESTRIL) tablet 20 mg  20 mg Oral DAILY    hydroCHLOROthiazide (HYDRODIURIL) tablet 12.5 mg  12.5 mg Oral DAILY    pantoprazole (PROTONIX) tablet 40 mg  40 mg Oral BID    loratadine (CLARITIN) tablet 10 mg  10 mg Oral DAILY    fluticasone propionate (FLONASE) 50 mcg/actuation nasal spray 2 Spray  2 Spray Both Nostrils DAILY    montelukast (SINGULAIR) tablet 10 mg  10 mg Oral QHS         Imaging, microbiology, and EKG/Telemetry:  Xr Chest Port    Result Date: 7/10/2020  IMPRESSION: Slight improvement in basilar aeration. Xr Chest Port    Result Date: 7/9/2020  IMPRESSION: Decreased inspiration since the prior day, otherwise no change    Assessment/Plan   Lauri De Dios is a 60 y.o. female with PMH of COPD on home O2, IDDM2, HTN, HFpEF, SHERRIE on CPAP, GERD, allergic rhinitis, now admitted for Acute on chronic hypoxic respiratory failure 2/2 covid-19.      Acute on chronic hypoxic respiratory failure likely 2/2 Covid PNA    Pt w/ positive covid swab (7/7). Pt with severe underlying lung disease, Severe COPD on max therapy w/ strong asthma component. Hospitalized 5x in 2020 for COPD exacerbations. Followed by Dr. Nazia Richard in OP.    Pt on bipap overnight and sating well on 4L this AM.   Covid labs today showing dec leukocytes, LD 270<240, ferritin 166 (150), D-dimer 0.3, fibrinogen 617  Bcx NTD, Ucx pending. CXR today: showing pulm vascular congestion but slightly improved aeration.     Plan:  - Bipap or suppl O2 to maintain o2 sats 88-92  - Pulm consulted this AM, Dr. Juice Magana to see, FU recs  - ID consulted, remdesivir ordered and consented for covalescent plasma   - Methylprednisone 40mg q6hr  - Suppl oxygen w/ goal 88-92%  - Inhalers per pulmonology: Sandrea Killings and pulmicort nebs, Duonebs q4hrs prn and Albuterol MDI q4hr prn   - Daily Covid labs: cbc, bmp, ferritin, LD, D-dimer, Crp, Coags  - VitC 500mg twice daily, zinc sulfate 1mg daily, vit D 2000u daily   - Enoxoparin increased to 120mg q12hr  - Tylenol 500mg q6hr as needed for fever (abnormal ltfs)  - FU BCx, Ucx  - PVLs to assess for dvt     Insulin dependent Type 2 Diabetes  Home lantus 40u pm and novolog SSI. BS raised this >881>540.   -Lantus 25U and increase as PO intake increases  -SSI   -Accuchecks ACHS  -diet clears, advance to diabetic if tolerated      Hypertension, HFpEF  ECHO (5/24/20) HT 97 - 70%. No regional wall motion abnormality. Moderate (grade 2) left ventricular diastolic dysfunction. Mildly dilated left atrium. Pulmonary hypertension. Pulmonary arterial systolic pressure is 61 mmHg. Severely elevated central venous pressure (15+ mmHg); IVC diameter is larger than 21 mm and collapses less than 50% with respiration. SBP in ED elevated  to 140s-170s. Pt on lisinopril 20mg BID and HCTZ 12.5mg daily at home. Pt also on Lasix 20mg daily PRN for lower extremity edema at home. Patient endorsing increased swelling in  Abdomen and legs.    - Continue Lasix 20mg daily  - Continue Lisinopril 20mg BID  - Continue HCTZ 12.5 mg daily     GERD  Pt takes omeprazole 40mg daily and pepcid for breakthrough symptoms at home.   -Continue protonix 40mg daily     Morbid obesity- BMI 46        Diet Clears, then Diabetic   DVT Prophylaxis lovenox   GI Prophylaxis Protonix   Code status Full Disposition Telem[place]>2MN      Point of Contact Katelynn Maddox  Relationship: Daughter  [phone] [address], [place]   [address] Medicine Intern  07/10/20 9:17 AM

## 2020-07-11 ENCOUNTER — APPOINTMENT (OUTPATIENT)
Dept: GENERAL RADIOLOGY | Age: 60
DRG: 177 | End: 2020-07-11
Attending: FAMILY MEDICINE
Payer: MEDICARE

## 2020-07-11 ENCOUNTER — APPOINTMENT (OUTPATIENT)
Dept: GENERAL RADIOLOGY | Age: 60
DRG: 177 | End: 2020-07-11
Attending: STUDENT IN AN ORGANIZED HEALTH CARE EDUCATION/TRAINING PROGRAM
Payer: MEDICARE

## 2020-07-11 LAB
ALBUMIN SERPL-MCNC: 2.9 G/DL (ref 3.4–5)
ALBUMIN/GLOB SERPL: 0.9 {RATIO} (ref 0.8–1.7)
ALP SERPL-CCNC: 97 U/L (ref 45–117)
ALT SERPL-CCNC: 79 U/L (ref 13–56)
ANION GAP SERPL CALC-SCNC: 7 MMOL/L (ref 3–18)
APTT PPP: 40.2 SEC (ref 23–36.4)
AST SERPL-CCNC: 68 U/L (ref 10–38)
BACTERIA SPEC CULT: NORMAL
BILIRUB SERPL-MCNC: 0.4 MG/DL (ref 0.2–1)
BUN SERPL-MCNC: 13 MG/DL (ref 7–18)
BUN/CREAT SERPL: 15 (ref 12–20)
CALCIUM SERPL-MCNC: 8.2 MG/DL (ref 8.5–10.1)
CHLORIDE SERPL-SCNC: 105 MMOL/L (ref 100–111)
CO2 SERPL-SCNC: 28 MMOL/L (ref 21–32)
CREAT SERPL-MCNC: 0.85 MG/DL (ref 0.6–1.3)
CRP SERPL-MCNC: 14.5 MG/DL (ref 0–0.3)
D DIMER PPP FEU-MCNC: 0.33 UG/ML(FEU)
ERYTHROCYTE [DISTWIDTH] IN BLOOD BY AUTOMATED COUNT: 14.1 % (ref 11.6–14.5)
FERRITIN SERPL-MCNC: 283 NG/ML (ref 8–388)
FIBRINOGEN PPP-MCNC: 659 MG/DL (ref 210–451)
GLOBULIN SER CALC-MCNC: 3.4 G/DL (ref 2–4)
GLUCOSE BLD STRIP.AUTO-MCNC: 225 MG/DL (ref 70–110)
GLUCOSE BLD STRIP.AUTO-MCNC: 242 MG/DL (ref 70–110)
GLUCOSE BLD STRIP.AUTO-MCNC: 267 MG/DL (ref 70–110)
GLUCOSE BLD STRIP.AUTO-MCNC: 274 MG/DL (ref 70–110)
GLUCOSE BLD STRIP.AUTO-MCNC: 405 MG/DL (ref 70–110)
GLUCOSE SERPL-MCNC: 239 MG/DL (ref 74–99)
HCT VFR BLD AUTO: 33 % (ref 35–45)
HGB BLD-MCNC: 10.9 G/DL (ref 12–16)
INR PPP: 1.1 (ref 0.8–1.2)
MCH RBC QN AUTO: 31 PG (ref 24–34)
MCHC RBC AUTO-ENTMCNC: 33 G/DL (ref 31–37)
MCV RBC AUTO: 93.8 FL (ref 74–97)
PLATELET # BLD AUTO: 248 K/UL (ref 135–420)
PMV BLD AUTO: 9.1 FL (ref 9.2–11.8)
POTASSIUM SERPL-SCNC: 4.5 MMOL/L (ref 3.5–5.5)
PROCALCITONIN SERPL-MCNC: 0.28 NG/ML
PROT SERPL-MCNC: 6.3 G/DL (ref 6.4–8.2)
PROTHROMBIN TIME: 14 SEC (ref 11.5–15.2)
RBC # BLD AUTO: 3.52 M/UL (ref 4.2–5.3)
SERVICE CMNT-IMP: NORMAL
SODIUM SERPL-SCNC: 140 MMOL/L (ref 136–145)
WBC # BLD AUTO: 5.7 K/UL (ref 4.6–13.2)

## 2020-07-11 PROCEDURE — 85027 COMPLETE CBC AUTOMATED: CPT

## 2020-07-11 PROCEDURE — 80053 COMPREHEN METABOLIC PANEL: CPT

## 2020-07-11 PROCEDURE — 74011000250 HC RX REV CODE- 250: Performed by: INTERNAL MEDICINE

## 2020-07-11 PROCEDURE — 85730 THROMBOPLASTIN TIME PARTIAL: CPT

## 2020-07-11 PROCEDURE — 82962 GLUCOSE BLOOD TEST: CPT

## 2020-07-11 PROCEDURE — 74011250636 HC RX REV CODE- 250/636: Performed by: INTERNAL MEDICINE

## 2020-07-11 PROCEDURE — 86140 C-REACTIVE PROTEIN: CPT

## 2020-07-11 PROCEDURE — 74011250637 HC RX REV CODE- 250/637: Performed by: HOSPITALIST

## 2020-07-11 PROCEDURE — 74011250637 HC RX REV CODE- 250/637: Performed by: STUDENT IN AN ORGANIZED HEALTH CARE EDUCATION/TRAINING PROGRAM

## 2020-07-11 PROCEDURE — 82728 ASSAY OF FERRITIN: CPT

## 2020-07-11 PROCEDURE — 74011000258 HC RX REV CODE- 258: Performed by: INTERNAL MEDICINE

## 2020-07-11 PROCEDURE — 84145 PROCALCITONIN (PCT): CPT

## 2020-07-11 PROCEDURE — 85379 FIBRIN DEGRADATION QUANT: CPT

## 2020-07-11 PROCEDURE — 74011636637 HC RX REV CODE- 636/637: Performed by: FAMILY MEDICINE

## 2020-07-11 PROCEDURE — 94660 CPAP INITIATION&MGMT: CPT

## 2020-07-11 PROCEDURE — 71045 X-RAY EXAM CHEST 1 VIEW: CPT

## 2020-07-11 PROCEDURE — 77010033678 HC OXYGEN DAILY

## 2020-07-11 PROCEDURE — 85384 FIBRINOGEN ACTIVITY: CPT

## 2020-07-11 PROCEDURE — 85610 PROTHROMBIN TIME: CPT

## 2020-07-11 PROCEDURE — 74011636637 HC RX REV CODE- 636/637: Performed by: STUDENT IN AN ORGANIZED HEALTH CARE EDUCATION/TRAINING PROGRAM

## 2020-07-11 PROCEDURE — 65660000000 HC RM CCU STEPDOWN

## 2020-07-11 PROCEDURE — 36415 COLL VENOUS BLD VENIPUNCTURE: CPT

## 2020-07-11 PROCEDURE — 36430 TRANSFUSION BLD/BLD COMPNT: CPT

## 2020-07-11 PROCEDURE — 94762 N-INVAS EAR/PLS OXIMTRY CONT: CPT

## 2020-07-11 RX ORDER — BENZONATATE 100 MG/1
100 CAPSULE ORAL
Status: DISCONTINUED | OUTPATIENT
Start: 2020-07-11 | End: 2020-07-18 | Stop reason: HOSPADM

## 2020-07-11 RX ADMIN — METHYLPREDNISOLONE SODIUM SUCCINATE 40 MG: 40 INJECTION, POWDER, FOR SOLUTION INTRAMUSCULAR; INTRAVENOUS at 17:53

## 2020-07-11 RX ADMIN — METHYLPREDNISOLONE SODIUM SUCCINATE 40 MG: 40 INJECTION, POWDER, FOR SOLUTION INTRAMUSCULAR; INTRAVENOUS at 04:21

## 2020-07-11 RX ADMIN — INSULIN GLARGINE 25 UNITS: 100 INJECTION, SOLUTION SUBCUTANEOUS at 20:36

## 2020-07-11 RX ADMIN — ZINC SULFATE 220 MG (50 MG) CAPSULE 1 CAPSULE: CAPSULE at 09:44

## 2020-07-11 RX ADMIN — REMDESIVIR 100 MG: 100 INJECTION, POWDER, LYOPHILIZED, FOR SOLUTION INTRAVENOUS at 20:31

## 2020-07-11 RX ADMIN — METHYLPREDNISOLONE SODIUM SUCCINATE 40 MG: 40 INJECTION, POWDER, FOR SOLUTION INTRAMUSCULAR; INTRAVENOUS at 09:44

## 2020-07-11 RX ADMIN — MONTELUKAST 10 MG: 10 TABLET, FILM COATED ORAL at 20:38

## 2020-07-11 RX ADMIN — FLUTICASONE PROPIONATE 2 SPRAY: 50 SPRAY, METERED NASAL at 09:00

## 2020-07-11 RX ADMIN — INSULIN LISPRO 6 UNITS: 100 INJECTION, SOLUTION INTRAVENOUS; SUBCUTANEOUS at 17:53

## 2020-07-11 RX ADMIN — PANTOPRAZOLE 40 MG: 40 TABLET, DELAYED RELEASE ORAL at 09:45

## 2020-07-11 RX ADMIN — Medication 500 MG: at 09:45

## 2020-07-11 RX ADMIN — INSULIN LISPRO 9 UNITS: 100 INJECTION, SOLUTION INTRAVENOUS; SUBCUTANEOUS at 13:22

## 2020-07-11 RX ADMIN — LISINOPRIL 20 MG: 20 TABLET ORAL at 09:45

## 2020-07-11 RX ADMIN — PANTOPRAZOLE 40 MG: 40 TABLET, DELAYED RELEASE ORAL at 20:39

## 2020-07-11 RX ADMIN — HYDROCHLOROTHIAZIDE 12.5 MG: 25 TABLET ORAL at 09:45

## 2020-07-11 RX ADMIN — ENOXAPARIN SODIUM 120 MG: 120 INJECTION SUBCUTANEOUS at 20:39

## 2020-07-11 RX ADMIN — METHYLPREDNISOLONE SODIUM SUCCINATE 40 MG: 40 INJECTION, POWDER, FOR SOLUTION INTRAMUSCULAR; INTRAVENOUS at 21:51

## 2020-07-11 RX ADMIN — CHOLECALCIFEROL TAB 25 MCG (1000 UNIT) 2 TABLET: 25 TAB at 09:45

## 2020-07-11 RX ADMIN — Medication 500 MG: at 20:37

## 2020-07-11 RX ADMIN — AZITHROMYCIN MONOHYDRATE 500 MG: 500 INJECTION, POWDER, LYOPHILIZED, FOR SOLUTION INTRAVENOUS at 21:54

## 2020-07-11 RX ADMIN — INSULIN LISPRO 6 UNITS: 100 INJECTION, SOLUTION INTRAVENOUS; SUBCUTANEOUS at 09:46

## 2020-07-11 RX ADMIN — ENOXAPARIN SODIUM 120 MG: 120 INJECTION SUBCUTANEOUS at 09:44

## 2020-07-11 RX ADMIN — LORATADINE 10 MG: 10 TABLET ORAL at 09:45

## 2020-07-11 RX ADMIN — ALBUTEROL SULFATE 2 PUFF: 90 AEROSOL, METERED RESPIRATORY (INHALATION) at 22:24

## 2020-07-11 RX ADMIN — FUROSEMIDE 20 MG: 20 TABLET ORAL at 09:45

## 2020-07-11 RX ADMIN — BENZONATATE 100 MG: 100 CAPSULE ORAL at 10:00

## 2020-07-11 RX ADMIN — INSULIN LISPRO 15 UNITS: 100 INJECTION, SOLUTION INTRAVENOUS; SUBCUTANEOUS at 21:49

## 2020-07-11 NOTE — PROGRESS NOTES
Infectious Disease progress Note        Reason: Acute hypoxic respiratory failure, confirmed COVID-19 pneumonia    Current abx Prior abx   remdesivir since 7/10/20  Azithromycin since 7/10 Ceftriaxone, vancomycin 7/9/2020-7/10     Lines:       Assessment :      60 y.o. female with PMH of COPD on home O2, IDDM2 (last hemoglobin A1c 8.4 on 5/22/2020), HTN, HFpEF, SHERRIE on CPAP, GERD, allergic rhinitis, presented to ED on 7/9/2020 with fever, sob and cough w/ known covid+.      Chest x-ray 7/9/20: Bibasilar infiltrates     Clinical presentation consistent with acute hypoxic respiratory failure secondary to COVID-19 pneumonia superimposed on underlying COPD    Labs on 7/10-ferritin 166, , CRP 12.9, d-dimer 0.3  Labs on 7/11- ferritin 283, crp 14.5, procalcitonin 0.28, d-dimer 0.33    Underlying severe COPD, uncontrolled type 2 diabetes puts patient at high risk of clinical deterioration. Hence will use aggressive treatment for covid infection. Discussed with Dr. Martha Sam. Agrees with this. Venous duplex 7/10/20 negative for dvt    Clinically better. Improved cough. Recommendations:    1. Continue azithromycin, remdesivir. Monitor LFTs daily  2. Await convalescent plasma  3. Follow pulmonary recommendations regarding anticoagulation  4. Continue adjunctive therapy for COVID-19  5. Needs close monitoring  6. Maintain droplet plus isolation     Above plan was discussed in details with patient, nursing staff. Please call me if any further questions or concerns. Will continue to participate in the care of this patient. HPI:  Feels better. Improved shortness of breath and cough. States that she still has more than usual shortness of breath.   She cannot communicate since she had taken off the BiPAP for eating her breakfast.  Denies abdominal pain, nausea, vomiting, diarrhea.         home Medication List    Details   fluticasone propionate (Flovent HFA) 220 mcg/actuation inhaler Take 1 Puff by inhalation two (2) times a day. Qty: 1 Inhaler, Refills: 0      albuterol-ipratropium (DUO-NEB) 2.5 mg-0.5 mg/3 ml nebu 3 mL by Nebulization route every four (4) hours as needed for Wheezing. Qty: 30 Nebule, Refills: 0      albuterol (PROVENTIL HFA, VENTOLIN HFA, PROAIR HFA) 90 mcg/actuation inhaler Take 2 Puffs by inhalation every four (4) hours as needed for Wheezing. Qty: 1 Inhaler, Refills: 0      !! predniSONE (DELTASONE) 5 mg tablet Take 1 Tab by mouth daily. Qty: 60 Tab, Refills: 1    Associated Diagnoses: Asthma-COPD overlap syndrome ([address] Poorly controlled severe persistent asthma with acute exacerbation; Chronic respiratory failure with hypoxia and hypercapnia (HCC); COPD, group D, by GOLD 2017 classification ([address] Morbid obesity ([address] Obesity hypoventilation syndrome ([address] SHERRIE (obstructive sleep apnea); Current chronic use of systemic steroids; Chronic rhinitis; Dyspnea on exertion; Shortness of breath; Personal history of tobacco use, presenting hazards to health      omeprazole (PRILOSEC) 40 mg capsule Take  by mouth. !! insulin glargine (LANTUS,BASAGLAR) 100 unit/mL (3 mL) inpn 40 Units by SubCUTAneous route nightly. Please inject 40 units every bedtime. Indications: type 2 diabetes mellitus  Qty: 28 Units, Refills: 0      !! predniSONE (DELTASONE) 20 mg tablet Please take 60mg Prednisone(3 tabs) for 7 days, 50mg (2 1/2 tabs) for 7 days, 40mg(2 tabs) for 7 days, 30mg (1 1/2 tabs) for 7 days, 20mg (1 tabs) for 7 days, 10mg (1/2 tab) for 7 days, then take 5 mg daily from then on wards. Qty: 80 Tab, Refills: 0      furosemide (Lasix) 20 mg tablet Take 20 mg by mouth daily. simethicone (GAS-X) 125 mg capsule Take 125 mg by mouth four (4) times daily as needed for Flatulence. loratadine (CLARITIN) 10 mg tablet Take 10 mg by mouth daily as needed for Allergies. lisinopril (PRINIVIL, ZESTRIL) 20 mg tablet Take 20 mg by mouth two (2) times a day.       fluticasone propionate (FLONASE ALLERGY RELIEF) 50 mcg/actuation nasal spray 2 Sprays by Both Nostrils route daily. fluticasone propion-salmeterol (ADVAIR HFA) 230-21 mcg/actuation inhaler Take 2 Puffs by inhalation two (2) times a day. tiotropium bromide (SPIRIVA RESPIMAT) 2.5 mcg/actuation inhaler Take 2 Puffs by inhalation daily. NOVOLOG FLEXPEN U-100 INSULIN 100 unit/mL inpn Continue home Sliding scale insulin as prior to admission  Qty: 1 Pen, Refills: 0      OXYGEN-AIR DELIVERY SYSTEMS 2 L by Nasal route continuous. First Choice      aspirin delayed-release 81 mg tablet Take 81 mg by mouth daily. montelukast (SINGULAIR) 10 mg tablet Take 10 mg by mouth nightly. benzonatate (Tessalon Perles) 100 mg capsule Take 1 Cap by mouth three (3) times daily as needed for Cough for up to 7 days. Qty: 30 Cap, Refills: 0      dexAMETHasone (Decadron) 6 mg tablet Take one tablet PO daily  Qty: 5 Tab, Refills: 0      azithromycin (ZITHROMAX) 250 mg tablet Take 1 Tab by mouth every Monday, Wednesday, Friday. Monday-Friday. Qty: 30 Tab, Refills: 0    Associated Diagnoses: COPD, group D, by GOLD 2017 classification ([facility]      ! ! insulin glargine (Basaglar KwikPen U-100 Insulin) 100 unit/mL (3 mL) inpn 45 Units by SubCUTAneous route nightly. pantoprazole (PROTONIX) 40 mg tablet Take 1 Tab by mouth daily. Qty: 30 Tab, Refills: 0      lactobacillus sp. 50 billion cpu (BIO-K PLUS) 50 billion cell -375 mg cap capsule Take 1 Cap by mouth daily. Qty: 30 Cap, Refills: 0      hydroCHLOROthiazide (HYDRODIURIL) 12.5 mg tablet Take 12.5 mg by mouth daily. !! - Potential duplicate medications found. Please discuss with provider.           Current Facility-Administered Medications   Medication Dose Route Frequency    benzonatate (TESSALON) capsule 100 mg  100 mg Oral Q8H PRN    dextrose 10% infusion 125-250 mL  125-250 mL IntraVENous PRN    albuterol (PROVENTIL HFA, VENTOLIN HFA, PROAIR HFA) inhaler 2 Puff  2 Puff Inhalation Q4H PRN    arformoteroL (BROVANA) neb solution 15 mcg  15 mcg Nebulization BID RT    budesonide (PULMICORT) 500 mcg/2 ml nebulizer suspension  500 mcg Nebulization BID RT    methylPREDNISolone (PF) (SOLU-MEDROL) injection 40 mg  40 mg IntraVENous Q6H    enoxaparin (LOVENOX) injection 120 mg  1 mg/kg SubCUTAneous Q12H    0.9% sodium chloride infusion 250 mL  250 mL IntraVENous PRN    remdesivir 100 mg in 0.9% sodium chloride 250 mL IVPB  100 mg IntraVENous Q24H    azithromycin (ZITHROMAX) 500 mg in  mL  500 mg IntraVENous Q24H    sodium chloride (NS) flush 5-10 mL  5-10 mL IntraVENous PRN    insulin lispro (HUMALOG) injection   SubCUTAneous AC&HS    glucose chewable tablet 16 g  4 Tab Oral PRN    glucagon (GLUCAGEN) injection 1 mg  1 mg IntraMUSCular PRN    albuterol-ipratropium (DUO-NEB) 2.5 MG-0.5 MG/3 ML  3 mL Nebulization Q4H PRN    albuterol (ACCUNEB) nebulizer solution 1.25 mg  1.25 mg Nebulization Q4H PRN    acetaminophen (TYLENOL) tablet 500 mg  500 mg Oral Q6H PRN    Or    acetaminophen (TYLENOL) suppository 500 mg  500 mg Rectal Q6H PRN    ascorbic acid (vitamin C) (VITAMIN C) tablet 500 mg  500 mg Oral BID    cholecalciferol (VITAMIN D3) (1000 Units /25 mcg) tablet 2 Tab  2,000 Units Oral DAILY    zinc sulfate (ZINCATE) 220 (50) mg capsule 1 Cap  1 Cap Oral DAILY    insulin glargine (LANTUS) injection 25 Units  25 Units SubCUTAneous QHS    furosemide (LASIX) tablet 20 mg  20 mg Oral DAILY    lisinopriL (PRINIVIL, ZESTRIL) tablet 20 mg  20 mg Oral DAILY    hydroCHLOROthiazide (HYDRODIURIL) tablet 12.5 mg  12.5 mg Oral DAILY    pantoprazole (PROTONIX) tablet 40 mg  40 mg Oral BID    loratadine (CLARITIN) tablet 10 mg  10 mg Oral DAILY    fluticasone propionate (FLONASE) 50 mcg/actuation nasal spray 2 Spray  2 Spray Both Nostrils DAILY    montelukast (SINGULAIR) tablet 10 mg  10 mg Oral QHS       Allergies: Ancef [cefazolin]; Contrast agent [iodine];  Fish containing products; Statins-hmg-coa reductase inhibitors; Metformin; and Codeine    Temp (24hrs), Av °F (36.7 °C), Min:96 °F (35.6 °C), Max:99.4 °F (37.4 °C)    Visit Vitals  /78 (BP 1 Location: Left arm, BP Patient Position: At rest)   Pulse 64   Temp (!) 96 °F (35.6 °C)   Resp 18   Ht 5' 3\" (1.6 m)   Wt 120.7 kg (266 lb 3.2 oz)   SpO2 98%   BMI 47.16 kg/m²       ROS: Point review of systems obtained in details. Pertinent positive as mentioned in history of present illness otherwise negative  Physical Exam:    General:  Alert, sitting on the bed, BiPAP mask off the face on 3 L oxygen by nasal cannula   Head:  Normocephalic, without obvious abnormality, atraumatic. Lungs:    Bilateral chest movement equal, auscultation deferred   Chest wall:  No deformity. Heart:  Regular rate and rhythm   Abdomen:    non-distended   Extremities: normal, atraumatic, no cyanosis or edema.    Neurologic: Grossly nonfocal  Psychiatry: appropriate mood and affect         Labs: Results:   Chemistry Recent Labs     07/10/20  1636 07/10/20  0339 20  1553   * 238* 171*    141 138   K 4.0 4.1 3.5    108 104   CO2 25 27 30   BUN 12 11 13   CREA 0.88 0.87 0.92   CA 8.3* 8.1* 8.7   AGAP 9 6 4   BUCR 14 13 14     --  73   TP 6.1*  --  6.7   ALB 2.9*  --  3.1*   GLOB 3.2  --  3.6   AGRAT 0.9  --  0.9      CBC w/Diff Recent Labs     07/10/20  0339 20  1553   WBC 4.5* 5.8   RBC 3.48* 3.76*   HGB 10.7* 11.5*   HCT 32.4* 34.6*    241   GRANS 81* 73   LYMPH 11* 18*   EOS 0 0      Microbiology Recent Labs     20  1553 20  1543   CULT NO GROWTH 2 DAYS NO GROWTH 2 DAYS          RADIOLOGY:    All available imaging studies/reports in [facility] for this admission were reviewed    Consent:salty seth  ,who was seen by synchronous (real-time) audio-video technology, and/or her healthcare decision maker, is aware that this patient-initiated, Telehealth encounter on 7/10//2020 is a billable service, with coverage as determined by her insurance carrier. She is aware that she may receive a bill and has provided verbal consent to proceed: Yes. Due to this being a TeleHealth encounter (During OJJPL-05 public health emergency), evaluation of the following organ systems was limited: Vitals/Constitutional/EENT/Resp/CV/GI//MS/Neuro/Skin/Heme-Lymph-Imm. Pursuant to the emergency declaration under the [address] [place] [address], [address] [place] [address] and the [employer] Act, this Virtual Visit was conducted with patient's (and/or legal guardian's) consent, to reduce the risk of exposure to COVID-19 ,preserve PPE and provide necessary medical care. Services were provided through a video synchronous discussion virtually to substitute for in-person encounter.      Dr. Jeff Browning, Infectious Disease Specialist  [phone]  July 11, 2020  2:51 PM

## 2020-07-11 NOTE — PROGRESS NOTES
Intern Progress Note  [facility]       Patient: Jeromy Eason MRN: [id number]  CSN: [id number]    YOB: 1960  Age: 60 y.o. Sex: female    DOA: 7/9/2020 LOS:  LOS: 2 days                    Subjective:     Acute events: None    Dr. Adithya Castro interviewed pt, due to Covid restrictions. Pt reports she feels better; no diarrhea today, previously had 5 days of diarrhea; cough has returned to baseline.   Still reporting tight chest myalgias, and F/C.     ROS  In subjective     Objective:      Patient Vitals for the past 24 hrs:   Temp Pulse Resp BP SpO2   07/11/20 1200 98 °F (36.7 °C) 74 18 171/83 96 %   07/11/20 0800 (!) 96 °F (35.6 °C) 64 18 137/78 98 %   07/11/20 0436 97 °F (36.1 °C) 65 20 153/81 98 %   07/11/20 0045 99 °F (37.2 °C) 78 20 109/57 90 %   07/11/20 0002 97.6 °F (36.4 °C) 79 22 150/87 98 %   07/10/20 2009 99 °F (37.2 °C) 78 20 109/57 90 %         Intake/Output Summary (Last 24 hours) at 7/11/2020 1411  Last data filed at 7/11/2020 0151  Gross per 24 hour   Intake 200 ml   Output --   Net 200 ml       Physical Exam (by Dr. Adithya Castro):  General:     CV:      Resp:   Abd:    Ext:    Skin:      Lab/Data Reviewed:  BMP:   Lab Results   Component Value Date/Time     07/11/2020 04:14 AM    K 4.5 07/11/2020 04:14 AM     07/11/2020 04:14 AM    CO2 28 07/11/2020 04:14 AM    AGAP 7 07/11/2020 04:14 AM     (H) 07/11/2020 04:14 AM    BUN 13 07/11/2020 04:14 AM    CREA 0.85 07/11/2020 04:14 AM    GFRAA >60 07/11/2020 04:14 AM    GFRNA >60 07/11/2020 04:14 AM     CBC:   Lab Results   Component Value Date/Time    WBC 5.7 07/11/2020 04:14 AM    HGB 10.9 (L) 07/11/2020 04:14 AM    HCT 33.0 (L) 07/11/2020 04:14 AM     07/11/2020 04:14 AM        Scheduled Medications Reviewed:  Current Facility-Administered Medications   Medication Dose Route Frequency    arformoteroL (BROVANA) neb solution 15 mcg  15 mcg Nebulization BID RT    budesonide (PULMICORT) 500 mcg/2 ml nebulizer suspension  500 mcg Nebulization BID RT    methylPREDNISolone (PF) (SOLU-MEDROL) injection 40 mg  40 mg IntraVENous Q6H    enoxaparin (LOVENOX) injection 120 mg  1 mg/kg SubCUTAneous Q12H    remdesivir 100 mg in 0.9% sodium chloride 250 mL IVPB  100 mg IntraVENous Q24H    azithromycin (ZITHROMAX) 500 mg in  mL  500 mg IntraVENous Q24H    insulin lispro (HUMALOG) injection   SubCUTAneous AC&HS    ascorbic acid (vitamin C) (VITAMIN C) tablet 500 mg  500 mg Oral BID    cholecalciferol (VITAMIN D3) (1000 Units /25 mcg) tablet 2 Tab  2,000 Units Oral DAILY    zinc sulfate (ZINCATE) 220 (50) mg capsule 1 Cap  1 Cap Oral DAILY    insulin glargine (LANTUS) injection 25 Units  25 Units SubCUTAneous QHS    furosemide (LASIX) tablet 20 mg  20 mg Oral DAILY    lisinopriL (PRINIVIL, ZESTRIL) tablet 20 mg  20 mg Oral DAILY    hydroCHLOROthiazide (HYDRODIURIL) tablet 12.5 mg  12.5 mg Oral DAILY    pantoprazole (PROTONIX) tablet 40 mg  40 mg Oral BID    loratadine (CLARITIN) tablet 10 mg  10 mg Oral DAILY    fluticasone propionate (FLONASE) 50 mcg/actuation nasal spray 2 Spray  2 Spray Both Nostrils DAILY    montelukast (SINGULAIR) tablet 10 mg  10 mg Oral QHS         Imaging, microbiology, and EKG/Telemetry:  No results found. Assessment/Plan   Lauri De Dios is a 60 y.o. female with PMH of COPD on home O2, IDDM2, HTN, HFpEF, SHERRIE on CPAP, GERD, allergic rhinitis, now admitted for Acute on chronic hypoxic respiratory failure 2/2 covid-19. Dr. Aguero  spoke to pt about the possibility of intubation and how it is likely that her lungs would become vent dependent. Pt understood and still requests Full Code.     Acute on chronic hypoxic respiratory failure likely 2/2 Covid PNA    Pt w/ positive covid swab (7/7). Pt with severe underlying lung disease, Severe COPD on max therapy w/ strong asthma component. Hospitalized 5x in 2020 for COPD exacerbations.  Followed by Dr. Sanford Fields in OP.   Pt requiring bipap much less. No leukocytosis. ferritin 283, crp 14.5, procalcitonin 0.28, d-dimer 0.33  Bcx NTD, Ucx NGTD   Plan:  - Bipap or suppl O2 to maintain o2 sats 88-92  - appreciate pulm recs   - bipap until she can tolerate HFNC   - continue nebulizers, brovana and pulmicort   - IV cirticosteroid, remdesivir   - full dose AC   - azithromycin per ID recs   - serial inflammatory makers - ferritin, CRP  - appreciate ID recs    - continue azithromycin, remdesivir. Monitor LFTs daily   - await convalescent plasma  - Methylprednisone 40mg q6hr  - Suppl oxygen w/ goal 88-92%  - Inhalers per pulmonology: Fabi Rust and pulmicort nebs, Duonebs q4hrs prn and Albuterol MDI q4hr prn   - Daily Covid labs: cbc, bmp, ferritin, LD, D-dimer, Crp, Coags  - VitC 500mg twice daily, zinc sulfate 1mg daily, vit D 2000u daily   - Enoxoparin 120mg q12hr  - Tylenol 500mg q6hr as needed for fever (abnormal ltfs)  - PVLs to assess for dvt     Insulin dependent Type 2 Diabetes  Home lantus 40u pm and novolog SSI. BS raised this >869>371.   -Lantus 25U and increase as PO intake increases  -SSI   -Accuchecks ACHS  -diet full liquid diet      Hypertension, HFpEF  ECHO (5/24/20) AY 61 - 70%. No regional wall motion abnormality. Moderate (grade 2) left ventricular diastolic dysfunction. Mildly dilated left atrium. Pulmonary hypertension. Pulmonary arterial systolic pressure is 61 mmHg. Severely elevated central venous pressure (15+ mmHg); IVC diameter is larger than 21 mm and collapses less than 50% with respiration. SBP in ED elevated  to 140s-170s. Pt on lisinopril 20mg BID and HCTZ 12.5mg daily at home. Pt also on Lasix 20mg daily PRN for lower extremity edema at home. Patient endorsing increased swelling in  Abdomen and legs.    - Continue Lasix 20mg daily  - Continue Lisinopril 20mg BID  - Continue HCTZ 12.5 mg daily     GERD  Pt takes omeprazole 40mg daily and pepcid for breakthrough symptoms at home.   -Continue protonix 40mg daily     Morbid obesity- BMI 46        Diet Full liquid diet   DVT Prophylaxis lovenox   GI Prophylaxis Protonix   Code status Full   Disposition [place]>2MN      Point of Contact Lisa Giordano  Relationship: Daughter  [phone]         Galindo Escalante MD   [address] Intern  07/11/20 9:17 AM

## 2020-07-11 NOTE — ROUTINE PROCESS
Received verbal report from [place], report included SBAR, MAR, and Kardex. Form was unable to be faxed during day shift to [facility], received a call from the [facility] asking for form to be faxed. I told the person on the phone I could not do that at the present time but I would get to it within a couple of hours. The form does not have a fax number on it, the Supervisor does not have a fax number, the [facility] Physician number continuously ranged. Hal in the lab is going to try and locate a fax number and more info on the [facility] convalescent plasma. Hal informed me the plasma was not in the blood bank and it will not be here this evening. Rubina Kendall followed up with a call to me, she did not have a fax number or full knowledge of the plasma process but would reach out to someone she knows. I will ask the day shift nurse to see if she can contact someone in the morning at the [facility], not sure if someone will be in the office on Saturday. Gave verbal report to [place], report included SBAR, MAR, and Kardex.

## 2020-07-11 NOTE — CONSULTS
[facility]. Pulmonary, Critical Care, and Sleep Medicine    Initial Patient Consult    Name: Latonya Etienne MRN: [id number]   : 1960 Hospital: [facility]   Date: 2020        IMPRESSION:   · Acute on Chronic Hypoxic Respiratory Failure  · - 2/2 Covid19 Pneumonia  · Covid19 Pneumonia  · Severe Asthma/COPD  · - pt of [facility] [place]  · - currently on LAMA/LABA/ICS, chronic pred 5mg, azithromycin, awaiting approval for benralizumab  · SHERRIE on trilogy machine. Patient Active Problem List   Diagnosis Code    Essential hypertension, benign I10    Diabetes mellitus, type 2 ([facility] [address] E11.9    Esophageal reflux K21.9    SHERRIE on CPAP G47.33, Z99.89    COPD (chronic obstructive pulmonary disease) ([facility]) J44.9    Allergic rhinitis J30.9    COPD with acute exacerbation ([facility]) J44.1    Obesity, Class III, BMI 40-49.9 (morbid obesity) ([facility]) E66.01    Chest pain R07.9    Dyspnea R06.00    COPD exacerbation ([facility]) J44.1    Acute exacerbation of COPD with asthma ([facility]) J44.1, T71.191    Diastolic CHF, chronic ([facility]) I50.32    Diverticulosis K57.90    COPD with acute bronchitis ([facility]) J44.0, J20.9    Hyperlipidemia E78.5    Type 2 diabetes with nephropathy ([facility]) E11.21    Bilateral carotid bruits R09.89    Palpitations R00.2    Acute exacerbation of chronic obstructive pulmonary disease (COPD) ([facility]) J44.1    Atelectasis of right lung J98.11    Hypoxia R09.02    COVID-19 U07.1    Hypokalemia E87.6      RECOMMENDATIONS:   · bipap therapy for now, may be able to de-escalate to HFNC  · Continue nebulizers- brovana and pulmicort  · IV corticosteroids, remdesivir  · Full dose AC  · Azithromycin per ID recommendations  · Serial inflammatory markers- ferritin, CRP  · Check procal  · Will Follow     Subjective: This patient has been seen and evaluated at the request of Dr. Sue Taylor for respiratory failure.  Patient is a 60 y.o. female with PMHx of severe COPD/Asthma who follows in our clinic with Morales Saleh. She was diagnosed with covid19 on 7/6. She was having increased dyspnea, headache, diarrhea,  fevers so she came to the Ed. Shes normally on home O2 and triology machine. In the ED, she was placed on NC. CXR was relatively stable. Overnight and this AM she apparently became more short of breath and was placed on bipap. She has severe COPD/asthma and is currently on LAMA/LABA/ICS, azithromycin, chronic prednisone 5mg daily, trilogy machine and home o2. Benralizumab was ordered last visit but per patient not yet approved. On my assessment, she appears very anxious, diaphoretic and tachypneic. Monitor is not in the room, so vital are unclear but seems to have bounding pulse. She is endorses pleuritic chest pain and overall is having trouble breathing.     7/11:  Breathing improved today  Improved aeration on CXR  Waiting on convalescent serum      Past Medical History:   Diagnosis Date    Asthma     Chronic lung disease     COPD     Cystocele, midline     Diabetes mellitus ([address]     GERD (gastroesophageal reflux disease)     Hidradenitis suppurativa     Hyperlipidemia     Hypertension     SHERRIE on CPAP     CPAP    Stress incontinence       Past Surgical History:   Procedure Laterality Date    BREAST SURGERY PROCEDURE UNLISTED      Right breast benign tumor removal    HX APPENDECTOMY      HX CHOLECYSTECTOMY      HX DILATION AND CURETTAGE      Dysfunctional uterine bleeding, thought 2/2 fibroids    HX TUBAL LIGATION        Prior to Admission medications    Medication Sig Start Date End Date Taking? Authorizing Provider   fluticasone propionate (Flovent HFA) 220 mcg/actuation inhaler Take 1 Puff by inhalation two (2) times a day. 7/7/20  Yes Evonne Kate PA-C   albuterol-ipratropium (DUO-NEB) 2.5 mg-0.5 mg/3 ml nebu 3 mL by Nebulization route every four (4) hours as needed for Wheezing.  7/7/20  Yes Evonne Kate PA-C   albuterol (PROVENTIL HFA, VENTOLIN HFA, PROAIR HFA) 90 mcg/actuation inhaler Take 2 Puffs by inhalation every four (4) hours as needed for Wheezing. 7/7/20  Yes Evonne Kate PA-C   predniSONE (DELTASONE) 5 mg tablet Take 1 Tab by mouth daily. 7/2/20  Yes Destiny Buckner MD   omeprazole (PRILOSEC) 40 mg capsule Take  by mouth. 3/9/18  Yes Provider, Historical   insulin glargine (LANTUS,BASAGLAR) 100 unit/mL (3 mL) inpn 40 Units by SubCUTAneous route nightly. Please inject 40 units every bedtime. Indications: type 2 diabetes mellitus 5/26/20  Yes Aldo Palacio MD   predniSONE (DELTASONE) 20 mg tablet Please take 60mg Prednisone(3 tabs) for 7 days, 50mg (2 1/2 tabs) for 7 days, 40mg(2 tabs) for 7 days, 30mg (1 1/2 tabs) for 7 days, 20mg (1 tabs) for 7 days, 10mg (1/2 tab) for 7 days, then take 5 mg daily from then on wards. 5/26/20  Yes Aldo Palacio MD   furosemide (Lasix) 20 mg tablet Take 20 mg by mouth daily. Yes Provider, Historical   simethicone (GAS-X) 125 mg capsule Take 125 mg by mouth four (4) times daily as needed for Flatulence. Yes Provider, Historical   loratadine (CLARITIN) 10 mg tablet Take 10 mg by mouth daily as needed for Allergies. Yes Provider, Historical   lisinopril (PRINIVIL, ZESTRIL) 20 mg tablet Take 20 mg by mouth two (2) times a day. Yes Provider, Historical   fluticasone propionate (FLONASE ALLERGY RELIEF) 50 mcg/actuation nasal spray 2 Sprays by Both Nostrils route daily. Yes Provider, Historical   fluticasone propion-salmeterol (ADVAIR HFA) 230-21 mcg/actuation inhaler Take 2 Puffs by inhalation two (2) times a day. Yes Provider, Historical   tiotropium bromide (SPIRIVA RESPIMAT) 2.5 mcg/actuation inhaler Take 2 Puffs by inhalation daily. Yes Provider, Historical   NOVOLOG FLEXPEN U-100 INSULIN 100 unit/mL inpn Continue home Sliding scale insulin as prior to admission  Patient taking differently: 1 Units by SubCUTAneous route three (3) times daily.  If BG <100=0u  101-150=5u  151-250=8u  251-300=12u  >300 call MD   7/10/18  Yes Earline Titus MD   OXYGEN-AIR DELIVERY SYSTEMS 2 L by Nasal route continuous. First Choice   Yes Provider, Historical   aspirin delayed-release 81 mg tablet Take 81 mg by mouth daily. Yes Provider, Historical   montelukast (SINGULAIR) 10 mg tablet Take 10 mg by mouth nightly. Yes Other, MD Lauren   benzonatate (Tessalon Perles) 100 mg capsule Take 1 Cap by mouth three (3) times daily as needed for Cough for up to 7 days. 7/7/20 7/14/20  Evonne Kate PA-C   dexAMETHasone (Decadron) 6 mg tablet Take one tablet PO daily 7/6/20   Ashwin Jauregui DO   azithromycin (ZITHROMAX) 250 mg tablet Take 1 Tab by mouth every Monday, Wednesday, Friday. Monday-Friday. 7/3/20   Simran Buckley MD   insulin glargine (Basaglar KwikPen U-100 Insulin) 100 unit/mL (3 mL) inpn 45 Units by SubCUTAneous route nightly. Provider, Historical   pantoprazole (PROTONIX) 40 mg tablet Take 1 Tab by mouth daily. 5/27/20   Mary Kwok MD   lactobacillus sp. 50 billion cpu (BIO-K PLUS) 50 billion cell -375 mg cap capsule Take 1 Cap by mouth daily. 3/10/20   Mary Kwok MD   hydroCHLOROthiazide (HYDRODIURIL) 12.5 mg tablet Take 12.5 mg by mouth daily. Provider, Historical     Allergies   Allergen Reactions    Ancef [Cefazolin] Hives    Contrast Agent [Iodine] Anaphylaxis, Shortness of Breath and Swelling     Needs pre-medication for IV contrast with Benadryl, Solu-Medrol    Fish Containing Products Anaphylaxis     Pt states she had a severe allergic reaction at 7 y/o.  Statins-Hmg-Coa Reductase Inhibitors Myalgia    Metformin Other (comments)     Abdominal pain, diarrhea.     Codeine Other (comments)     Altered mental status      Social History     Tobacco Use    Smoking status: Former Smoker     Packs/day: 1.00     Years: 30.00     Pack years: 30.00     Types: Cigarettes     Start date: 5/6/1966     Last attempt to quit: 9/6/2006     Years since quittin.8    Smokeless tobacco: Former User    Tobacco comment: 1-1.5 packs per day   Substance Use Topics    Alcohol use: No      Family History   Problem Relation Age of Onset    Hypertension Mother     Stroke Mother     Breast Cancer Mother         Bilateral mastectomies    Cancer Mother         ovarian and breast    Heart Failure Mother     Heart Attack Father         2011    Heart Surgery Father         CABG    Heart Failure Father     COPD Sister         Heavy smoker    Cancer Sister         ovarian    Heart Failure Sister     Lung Disease Sister     Asthma Child     Cancer Maternal Aunt         pancreatic    Cancer Maternal Grandfather         stomach        Current Facility-Administered Medications   Medication Dose Route Frequency    arformoteroL (BROVANA) neb solution 15 mcg  15 mcg Nebulization BID RT    budesonide (PULMICORT) 500 mcg/2 ml nebulizer suspension  500 mcg Nebulization BID RT    methylPREDNISolone (PF) (SOLU-MEDROL) injection 40 mg  40 mg IntraVENous Q6H    enoxaparin (LOVENOX) injection 120 mg  1 mg/kg SubCUTAneous Q12H    remdesivir 100 mg in 0.9% sodium chloride 250 mL IVPB  100 mg IntraVENous Q24H    azithromycin (ZITHROMAX) 500 mg in  mL  500 mg IntraVENous Q24H    insulin lispro (HUMALOG) injection   SubCUTAneous AC&HS    ascorbic acid (vitamin C) (VITAMIN C) tablet 500 mg  500 mg Oral BID    cholecalciferol (VITAMIN D3) (1000 Units /25 mcg) tablet 2 Tab  2,000 Units Oral DAILY    zinc sulfate (ZINCATE) 220 (50) mg capsule 1 Cap  1 Cap Oral DAILY    insulin glargine (LANTUS) injection 25 Units  25 Units SubCUTAneous QHS    furosemide (LASIX) tablet 20 mg  20 mg Oral DAILY    lisinopriL (PRINIVIL, ZESTRIL) tablet 20 mg  20 mg Oral DAILY    hydroCHLOROthiazide (HYDRODIURIL) tablet 12.5 mg  12.5 mg Oral DAILY    pantoprazole (PROTONIX) tablet 40 mg  40 mg Oral BID    loratadine (CLARITIN) tablet 10 mg  10 mg Oral DAILY    fluticasone propionate (FLONASE) 50 mcg/actuation nasal spray 2 Spray  2 Spray Both Nostrils DAILY    montelukast (SINGULAIR) tablet 10 mg  10 mg Oral QHS       Review of Systems:  Pertinent items are noted in HPI. ROS    Objective:   Vital Signs:    Visit Vitals  /78 (BP 1 Location: Left arm, BP Patient Position: At rest)   Pulse 64   Temp (!) 96 °F (35.6 °C)   Resp 18   Ht 5' 3\" (1.6 m)   Wt 120.7 kg (266 lb 3.2 oz)   SpO2 98%   BMI 47.16 kg/m²       O2 Device: BIPAP   O2 Flow Rate (L/min): 4 l/min   Temp (24hrs), Av °F (36.7 °C), Min:96 °F (35.6 °C), Max:99.4 °F (37.4 °C)       Intake/Output:   Last shift:      No intake/output data recorded. Last 3 shifts:  1901 -  0700  In: 200 [I.V.:200]  Out: -     Intake/Output Summary (Last 24 hours) at 2020 1000  Last data filed at 2020 4417  Gross per 24 hour   Intake 200 ml   Output --   Net 200 ml      Physical Exam:   General:  Alert, cooperative, no distress, appears stated age. Head:  Normocephalic, without obvious abnormality, atraumatic. Lungs:   Bilateral auscultation decreased air movement, no rales or wheezes   Chest wall:  No tenderness or deformity. NO CREPITUS   Heart:  Regular rate and rhythm, S1, S2 normal, no murmur, click, rub or gallop. Abdomen:   Soft, non-tender. Bowel sounds normal. No masses,  No organomegaly. No paradox   Extremities: normal, atraumatic, no cyanosis or edema. Neurologic: Grossly nonfocal          Data review:   Labs:  Recent Results (from the past 24 hour(s))   CALCIUM, IONIZED    Collection Time: 07/10/20 10:18 AM   Result Value Ref Range    Ionized Calcium 1.20 1. 12 - 1.32 MMOL/L   GLUCOSE, POC    Collection Time: 07/10/20  1:15 PM   Result Value Ref Range    Glucose (POC) 147 (H) 70 - 110 mg/dL   TYPE & SCREEN    Collection Time: 07/10/20  4:31 PM   Result Value Ref Range    Crossmatch Expiration 2020     ABO/Rh(D) A POSITIVE     Antibody screen NEG    GLUCOSE, POC    Collection Time: 07/10/20  4:36 PM Result Value Ref Range    Glucose (POC) 195 (H) 70 - 277 mg/dL   METABOLIC PANEL, COMPREHENSIVE    Collection Time: 07/10/20  4:36 PM   Result Value Ref Range    Sodium 139 136 - 145 mmol/L    Potassium 4.0 3.5 - 5.5 mmol/L    Chloride 105 100 - 111 mmol/L    CO2 25 21 - 32 mmol/L    Anion gap 9 3.0 - 18 mmol/L    Glucose 178 (H) 74 - 99 mg/dL    BUN 12 7.0 - 18 MG/DL    Creatinine 0.88 0.6 - 1.3 MG/DL    BUN/Creatinine ratio 14 12 - 20      GFR est AA >60 >60 ml/min/1.73m2    GFR est non-AA >60 >60 ml/min/1.73m2    Calcium 8.3 (L) 8.5 - 10.1 MG/DL    Bilirubin, total 0.5 0.2 - 1.0 MG/DL    ALT (SGPT) 77 (H) 13 - 56 U/L    AST (SGOT) 85 (H) 10 - 38 U/L    Alk.  phosphatase 100 45 - 117 U/L    Protein, total 6.1 (L) 6.4 - 8.2 g/dL    Albumin 2.9 (L) 3.4 - 5.0 g/dL    Globulin 3.2 2.0 - 4.0 g/dL    A-G Ratio 0.9 0.8 - 1.7     GLUCOSE, POC    Collection Time: 07/10/20  8:13 PM   Result Value Ref Range    Glucose (POC) 249 (H) 70 - 110 mg/dL   GLUCOSE, POC    Collection Time: 07/11/20 12:16 AM   Result Value Ref Range    Glucose (POC) 267 (H) 70 - 110 mg/dL   PROTHROMBIN TIME + INR    Collection Time: 07/11/20  4:14 AM   Result Value Ref Range    Prothrombin time 14.0 11.5 - 15.2 sec    INR 1.1 0.8 - 1.2     PTT    Collection Time: 07/11/20  4:14 AM   Result Value Ref Range    aPTT 40.2 (H) 23.0 - 36.4 SEC   FIBRINOGEN    Collection Time: 07/11/20  4:14 AM   Result Value Ref Range    Fibrinogen 659 (H) 210 - 451 mg/dL   D DIMER    Collection Time: 07/11/20  4:14 AM   Result Value Ref Range    D DIMER 0.33 <0.46 ug/ml(FEU)   C REACTIVE PROTEIN, QT    Collection Time: 07/11/20  4:14 AM   Result Value Ref Range    C-Reactive protein 14.5 (H) 0 - 0.3 mg/dL   FERRITIN    Collection Time: 07/11/20  4:14 AM   Result Value Ref Range    Ferritin 283 8 - 388 NG/ML   PROCALCITONIN    Collection Time: 07/11/20  4:14 AM   Result Value Ref Range    Procalcitonin 0.28 ng/mL   GLUCOSE, POC    Collection Time: 07/11/20  7:59 AM Result Value Ref Range    Glucose (POC) 225 (H) 70 - 110 mg/dL       Imaging:  I have personally reviewed the patients radiographs and have reviewed the reports:  CXR Results  (Last 48 hours)               07/10/20 0557  XR CHEST PORT Final result    Impression:  IMPRESSION: Slight improvement in basilar aeration. Narrative:  EXAM: XR CHEST PORT       INDICATION: resp distress, covid       COMPARISON: 7/9/2020       FINDINGS: A portable AP radiograph of the chest was obtained at 0522 hours. Bones are unremarkable. The heart is moderately enlarged. Basilar atelectasis   noted bilaterally. Minimal infiltrates cannot be excluded. Increased pulmonary   vascular congestion evident bilaterally. There is no pneumothorax. 07/09/20 1459  XR CHEST PORT Final result    Impression:  IMPRESSION: Decreased inspiration since the prior day, otherwise no change       Narrative:  EXAM: XR CHEST PORT       INDICATION: COVID with new O2 req       COMPARISON: 7/7/2020       FINDINGS: A portable AP radiograph of the chest was obtained at 1424 hours. There is mild pulmonary vascular congestion. Slight decrease in inspiration   since the previous day. Slight atelectasis in the retrocardiac area. CT Results  (Last 48 hours)    None            High complexity decision making was performed during the evaluation of this patient at high risk for decompensation with multiple organ involvement     Above mentioned total time spent on reviewing the case/medical record/data/notes/EMR/patient examination/documentation/coordinating care with nurse/consultants, exclusive of procedures with complex decision making performed and > 50% time spent in face to face evaluation.      Buddy Greer MD

## 2020-07-12 ENCOUNTER — APPOINTMENT (OUTPATIENT)
Dept: GENERAL RADIOLOGY | Age: 60
DRG: 177 | End: 2020-07-12
Attending: STUDENT IN AN ORGANIZED HEALTH CARE EDUCATION/TRAINING PROGRAM
Payer: MEDICARE

## 2020-07-12 LAB
ABO + RH BLD: NORMAL
ALBUMIN SERPL-MCNC: 2.7 G/DL (ref 3.4–5)
ALBUMIN/GLOB SERPL: 0.6 {RATIO} (ref 0.8–1.7)
ALP SERPL-CCNC: 88 U/L (ref 45–117)
ALT SERPL-CCNC: 59 U/L (ref 13–56)
ANION GAP SERPL CALC-SCNC: 7 MMOL/L (ref 3–18)
APTT PPP: 42.9 SEC (ref 23–36.4)
AST SERPL-CCNC: 30 U/L (ref 10–38)
BASOPHILS # BLD: 0 K/UL (ref 0–0.1)
BASOPHILS NFR BLD: 0 % (ref 0–2)
BILIRUB SERPL-MCNC: 0.3 MG/DL (ref 0.2–1)
BLD PROD TYP BPU: NORMAL
BLOOD GROUP ANTIBODIES SERPL: NORMAL
BPU ID: NORMAL
BUN SERPL-MCNC: 21 MG/DL (ref 7–18)
BUN/CREAT SERPL: 23 (ref 12–20)
CALCIUM SERPL-MCNC: 8.9 MG/DL (ref 8.5–10.1)
CALLED TO:,BCALL1: NORMAL
CHLORIDE SERPL-SCNC: 104 MMOL/L (ref 100–111)
CO2 SERPL-SCNC: 28 MMOL/L (ref 21–32)
CREAT SERPL-MCNC: 0.9 MG/DL (ref 0.6–1.3)
CRP SERPL-MCNC: 8.2 MG/DL (ref 0–0.3)
D DIMER PPP FEU-MCNC: 0.35 UG/ML(FEU)
DIFFERENTIAL METHOD BLD: ABNORMAL
EOSINOPHIL # BLD: 0 K/UL (ref 0–0.4)
EOSINOPHIL NFR BLD: 0 % (ref 0–5)
ERYTHROCYTE [DISTWIDTH] IN BLOOD BY AUTOMATED COUNT: 13.6 % (ref 11.6–14.5)
FERRITIN SERPL-MCNC: 272 NG/ML (ref 8–388)
FIBRINOGEN PPP-MCNC: 706 MG/DL (ref 210–451)
GLOBULIN SER CALC-MCNC: 4.2 G/DL (ref 2–4)
GLUCOSE BLD STRIP.AUTO-MCNC: 262 MG/DL (ref 70–110)
GLUCOSE BLD STRIP.AUTO-MCNC: 274 MG/DL (ref 70–110)
GLUCOSE BLD STRIP.AUTO-MCNC: 315 MG/DL (ref 70–110)
GLUCOSE BLD STRIP.AUTO-MCNC: 370 MG/DL (ref 70–110)
GLUCOSE BLD STRIP.AUTO-MCNC: 378 MG/DL (ref 70–110)
GLUCOSE SERPL-MCNC: 263 MG/DL (ref 74–99)
HCT VFR BLD AUTO: 33.1 % (ref 35–45)
HGB BLD-MCNC: 11 G/DL (ref 12–16)
INR PPP: 1.1 (ref 0.8–1.2)
LYMPHOCYTES # BLD: 0.4 K/UL (ref 0.9–3.6)
LYMPHOCYTES NFR BLD: 8 % (ref 21–52)
MCH RBC QN AUTO: 30.3 PG (ref 24–34)
MCHC RBC AUTO-ENTMCNC: 33.2 G/DL (ref 31–37)
MCV RBC AUTO: 91.2 FL (ref 74–97)
MONOCYTES # BLD: 0.4 K/UL (ref 0.05–1.2)
MONOCYTES NFR BLD: 7 % (ref 3–10)
NEUTS SEG # BLD: 4.5 K/UL (ref 1.8–8)
NEUTS SEG NFR BLD: 85 % (ref 40–73)
PLATELET # BLD AUTO: 274 K/UL (ref 135–420)
PMV BLD AUTO: 9.1 FL (ref 9.2–11.8)
POTASSIUM SERPL-SCNC: 3.7 MMOL/L (ref 3.5–5.5)
PROCALCITONIN SERPL-MCNC: 0.18 NG/ML
PROT SERPL-MCNC: 6.9 G/DL (ref 6.4–8.2)
PROTHROMBIN TIME: 14 SEC (ref 11.5–15.2)
RBC # BLD AUTO: 3.63 M/UL (ref 4.2–5.3)
SODIUM SERPL-SCNC: 139 MMOL/L (ref 136–145)
SPECIMEN EXP DATE BLD: NORMAL
STATUS OF UNIT,%ST: NORMAL
UNIT DIVISION, %UDIV: 0
WBC # BLD AUTO: 5.3 K/UL (ref 4.6–13.2)

## 2020-07-12 PROCEDURE — 85025 COMPLETE CBC W/AUTO DIFF WBC: CPT

## 2020-07-12 PROCEDURE — 82728 ASSAY OF FERRITIN: CPT

## 2020-07-12 PROCEDURE — 85379 FIBRIN DEGRADATION QUANT: CPT

## 2020-07-12 PROCEDURE — 80053 COMPREHEN METABOLIC PANEL: CPT

## 2020-07-12 PROCEDURE — 74011250637 HC RX REV CODE- 250/637: Performed by: HOSPITALIST

## 2020-07-12 PROCEDURE — 82962 GLUCOSE BLOOD TEST: CPT

## 2020-07-12 PROCEDURE — 86140 C-REACTIVE PROTEIN: CPT

## 2020-07-12 PROCEDURE — 85384 FIBRINOGEN ACTIVITY: CPT

## 2020-07-12 PROCEDURE — 65660000000 HC RM CCU STEPDOWN

## 2020-07-12 PROCEDURE — 36415 COLL VENOUS BLD VENIPUNCTURE: CPT

## 2020-07-12 PROCEDURE — 85730 THROMBOPLASTIN TIME PARTIAL: CPT

## 2020-07-12 PROCEDURE — 94660 CPAP INITIATION&MGMT: CPT

## 2020-07-12 PROCEDURE — 94762 N-INVAS EAR/PLS OXIMTRY CONT: CPT

## 2020-07-12 PROCEDURE — 85610 PROTHROMBIN TIME: CPT

## 2020-07-12 PROCEDURE — 84145 PROCALCITONIN (PCT): CPT

## 2020-07-12 PROCEDURE — 74011250636 HC RX REV CODE- 250/636: Performed by: INTERNAL MEDICINE

## 2020-07-12 PROCEDURE — 74011250637 HC RX REV CODE- 250/637: Performed by: STUDENT IN AN ORGANIZED HEALTH CARE EDUCATION/TRAINING PROGRAM

## 2020-07-12 PROCEDURE — 74011000258 HC RX REV CODE- 258: Performed by: INTERNAL MEDICINE

## 2020-07-12 PROCEDURE — 77010033678 HC OXYGEN DAILY

## 2020-07-12 PROCEDURE — 74011000250 HC RX REV CODE- 250: Performed by: INTERNAL MEDICINE

## 2020-07-12 PROCEDURE — 94640 AIRWAY INHALATION TREATMENT: CPT

## 2020-07-12 PROCEDURE — 74011636637 HC RX REV CODE- 636/637: Performed by: FAMILY MEDICINE

## 2020-07-12 PROCEDURE — 71045 X-RAY EXAM CHEST 1 VIEW: CPT

## 2020-07-12 RX ORDER — INSULIN GLARGINE 100 [IU]/ML
30 INJECTION, SOLUTION SUBCUTANEOUS
Status: DISCONTINUED | OUTPATIENT
Start: 2020-07-12 | End: 2020-07-13

## 2020-07-12 RX ORDER — ENOXAPARIN SODIUM 100 MG/ML
40 INJECTION SUBCUTANEOUS EVERY 12 HOURS
Status: DISCONTINUED | OUTPATIENT
Start: 2020-07-12 | End: 2020-07-18 | Stop reason: HOSPADM

## 2020-07-12 RX ADMIN — METHYLPREDNISOLONE SODIUM SUCCINATE 40 MG: 40 INJECTION, POWDER, FOR SOLUTION INTRAMUSCULAR; INTRAVENOUS at 17:50

## 2020-07-12 RX ADMIN — BENZONATATE 100 MG: 100 CAPSULE ORAL at 00:00

## 2020-07-12 RX ADMIN — INSULIN LISPRO 15 UNITS: 100 INJECTION, SOLUTION INTRAVENOUS; SUBCUTANEOUS at 12:22

## 2020-07-12 RX ADMIN — AZITHROMYCIN MONOHYDRATE 500 MG: 500 INJECTION, POWDER, LYOPHILIZED, FOR SOLUTION INTRAVENOUS at 21:25

## 2020-07-12 RX ADMIN — Medication 500 MG: at 10:12

## 2020-07-12 RX ADMIN — INSULIN GLARGINE 30 UNITS: 100 INJECTION, SOLUTION SUBCUTANEOUS at 21:00

## 2020-07-12 RX ADMIN — Medication 500 MG: at 21:24

## 2020-07-12 RX ADMIN — ENOXAPARIN SODIUM 120 MG: 120 INJECTION SUBCUTANEOUS at 10:12

## 2020-07-12 RX ADMIN — ENOXAPARIN SODIUM 40 MG: 40 INJECTION SUBCUTANEOUS at 22:39

## 2020-07-12 RX ADMIN — LISINOPRIL 20 MG: 20 TABLET ORAL at 10:12

## 2020-07-12 RX ADMIN — LORATADINE 10 MG: 10 TABLET ORAL at 10:12

## 2020-07-12 RX ADMIN — REMDESIVIR 100 MG: 100 INJECTION, POWDER, LYOPHILIZED, FOR SOLUTION INTRAVENOUS at 21:25

## 2020-07-12 RX ADMIN — ZINC SULFATE 220 MG (50 MG) CAPSULE 1 CAPSULE: CAPSULE at 10:12

## 2020-07-12 RX ADMIN — PANTOPRAZOLE 40 MG: 40 TABLET, DELAYED RELEASE ORAL at 21:24

## 2020-07-12 RX ADMIN — FUROSEMIDE 20 MG: 20 TABLET ORAL at 10:12

## 2020-07-12 RX ADMIN — METHYLPREDNISOLONE SODIUM SUCCINATE 40 MG: 40 INJECTION, POWDER, FOR SOLUTION INTRAMUSCULAR; INTRAVENOUS at 21:00

## 2020-07-12 RX ADMIN — MONTELUKAST 10 MG: 10 TABLET, FILM COATED ORAL at 21:24

## 2020-07-12 RX ADMIN — METHYLPREDNISOLONE SODIUM SUCCINATE 40 MG: 40 INJECTION, POWDER, FOR SOLUTION INTRAMUSCULAR; INTRAVENOUS at 10:11

## 2020-07-12 RX ADMIN — INSULIN LISPRO 15 UNITS: 100 INJECTION, SOLUTION INTRAVENOUS; SUBCUTANEOUS at 17:49

## 2020-07-12 RX ADMIN — BENZONATATE 100 MG: 100 CAPSULE ORAL at 21:24

## 2020-07-12 RX ADMIN — INSULIN LISPRO 12 UNITS: 100 INJECTION, SOLUTION INTRAVENOUS; SUBCUTANEOUS at 21:26

## 2020-07-12 RX ADMIN — CHOLECALCIFEROL TAB 25 MCG (1000 UNIT) 2 TABLET: 25 TAB at 10:12

## 2020-07-12 RX ADMIN — FLUTICASONE PROPIONATE 2 SPRAY: 50 SPRAY, METERED NASAL at 09:00

## 2020-07-12 RX ADMIN — HYDROCHLOROTHIAZIDE 12.5 MG: 25 TABLET ORAL at 10:12

## 2020-07-12 RX ADMIN — PANTOPRAZOLE 40 MG: 40 TABLET, DELAYED RELEASE ORAL at 10:12

## 2020-07-12 RX ADMIN — METHYLPREDNISOLONE SODIUM SUCCINATE 40 MG: 40 INJECTION, POWDER, FOR SOLUTION INTRAMUSCULAR; INTRAVENOUS at 03:28

## 2020-07-12 RX ADMIN — INSULIN LISPRO 9 UNITS: 100 INJECTION, SOLUTION INTRAVENOUS; SUBCUTANEOUS at 10:12

## 2020-07-12 NOTE — PROGRESS NOTES
Interim events noted. Patient is afebrile and hemodynamically stable. Oxygenation improved. Currently on nasal cannula 3 L  Labs reviewed-decreasing CRP, ferritin. D-dimer 0.35  Negative venous duplex. Will decrease anticoagulation to 40 mg sc q 12 hour  Time spent >10 min spent in chart review, internet consultation.

## 2020-07-12 NOTE — ROUTINE PROCESS
Plasma 1unit given as ordered and no blood reactions noted. Pt denies pain. 0710 Bedside shift change report given to [place] (oncoming nurse) by Kyle Weinstein (offgoing nurse). Report given with SBAR, Kardex, Intake/Output, MAR and Recent Results.

## 2020-07-12 NOTE — CONSULTS
[address] [place] [address] [facility] Pulmonary, Critical Care, and Sleep Medicine    Initial Patient Consult    Name: Nolberto Garber MRN: [id number]   : 1960 Hospital: [facility]   Date: 2020        IMPRESSION:   · Acute on Chronic Hypoxic Respiratory Failure  · - 2/2 Covid19 Pneumonia  · Covid19 Pneumonia  · Severe Asthma/COPD  · - pt of [facility]  · - currently on LAMA/LABA/ICS, chronic pred 5mg, azithromycin, awaiting approval for benralizumab  · SHERRIE on trilogy machine. Patient Active Problem List   Diagnosis Code    Essential hypertension, benign I10    Diabetes mellitus, type 2 ([facility] [address] E11.9    Esophageal reflux K21.9    SHERRIE on CPAP G47.33, Z99.89    COPD (chronic obstructive pulmonary disease) ([facility]) J44.9    Allergic rhinitis J30.9    COPD with acute exacerbation ([facility]) J44.1    Obesity, Class III, BMI 40-49.9 (morbid obesity) ([facility]) E66.01    Chest pain R07.9    Dyspnea R06.00    COPD exacerbation ([facility]) J44.1    Acute exacerbation of COPD with asthma ([facility]) J44.1, T99.214    Diastolic CHF, chronic ([facility]) I50.32    Diverticulosis K57.90    COPD with acute bronchitis ([facility]) J44.0, J20.9    Hyperlipidemia E78.5    Type 2 diabetes with nephropathy ([facility]) E11.21    Bilateral carotid bruits R09.89    Palpitations R00.2    Acute exacerbation of chronic obstructive pulmonary disease (COPD) ([facility]) J44.1    Atelectasis of right lung J98.11    Hypoxia R09.02    COVID-19 U07.1    Hypokalemia E87.6      RECOMMENDATIONS:   · Wean O2 to baseline requirement as tolerated  · Continue nebulizers- brovana and pulmicort  · IV corticosteroids, remdesivir  · Full dose AC  · Azithromycin per ID recommendations  · Serial inflammatory markers- ferritin, CRP  · Check procal  · Will Follow     Subjective: This patient has been seen and evaluated at the request of Dr. Charlene Campos for respiratory failure.  Patient is a 60 y.o. female with PMHx of severe COPD/Asthma who follows in our clinic with Juana Andrade. She was diagnosed with covid19 on 7/6. She was having increased dyspnea, headache, diarrhea,  fevers so she came to the Ed. Shes normally on home O2 and triology machine. In the ED, she was placed on NC. CXR was relatively stable. Overnight and this AM she apparently became more short of breath and was placed on bipap. She has severe COPD/asthma and is currently on LAMA/LABA/ICS, azithromycin, chronic prednisone 5mg daily, trilogy machine and home o2. Benralizumab was ordered last visit but per patient not yet approved. On my assessment, she appears very anxious, diaphoretic and tachypneic. Monitor is not in the room, so vital are unclear but seems to have bounding pulse. She is endorses pleuritic chest pain and overall is having trouble breathing.     7/11:  Breathing continues to improve subjectively; O2 requirement at 3 L/min (home O2 is 2 L/min)  Improved aeration on CXR  Received convalescent plasma yesterday evening      Past Medical History:   Diagnosis Date    Asthma     Chronic lung disease     COPD     Cystocele, midline     Diabetes mellitus ([facility] [address]     GERD (gastroesophageal reflux disease)     Hidradenitis suppurativa     Hyperlipidemia     Hypertension     SHERRIE on CPAP     CPAP    Stress incontinence       Past Surgical History:   Procedure Laterality Date    BREAST SURGERY PROCEDURE UNLISTED      Right breast benign tumor removal    HX APPENDECTOMY      HX CHOLECYSTECTOMY      HX DILATION AND CURETTAGE      Dysfunctional uterine bleeding, thought 2/2 fibroids    HX TUBAL LIGATION        Prior to Admission medications    Medication Sig Start Date End Date Taking? Authorizing Provider   fluticasone propionate (Flovent HFA) 220 mcg/actuation inhaler Take 1 Puff by inhalation two (2) times a day. 7/7/20  Yes Evonne Kate PA-C   albuterol-ipratropium (DUO-NEB) 2.5 mg-0.5 mg/3 ml nebu 3 mL by Nebulization route every four (4) hours as needed for Wheezing. 7/7/20  Yes Evonne Kate PA-C   albuterol (PROVENTIL HFA, VENTOLIN HFA, PROAIR HFA) 90 mcg/actuation inhaler Take 2 Puffs by inhalation every four (4) hours as needed for Wheezing. 7/7/20  Yes Evonne Kate PA-C   predniSONE (DELTASONE) 5 mg tablet Take 1 Tab by mouth daily. 7/2/20  Yes Thelma Porter MD   omeprazole (PRILOSEC) 40 mg capsule Take  by mouth. 3/9/18  Yes Provider, Historical   insulin glargine (LANTUS,BASAGLAR) 100 unit/mL (3 mL) inpn 40 Units by SubCUTAneous route nightly. Please inject 40 units every bedtime. Indications: type 2 diabetes mellitus 5/26/20  Yes Mc Rios MD   predniSONE (DELTASONE) 20 mg tablet Please take 60mg Prednisone(3 tabs) for 7 days, 50mg (2 1/2 tabs) for 7 days, 40mg(2 tabs) for 7 days, 30mg (1 1/2 tabs) for 7 days, 20mg (1 tabs) for 7 days, 10mg (1/2 tab) for 7 days, then take 5 mg daily from then on wards. 5/26/20  Yes Mc Rios MD   furosemide (Lasix) 20 mg tablet Take 20 mg by mouth daily. Yes Provider, Historical   simethicone (GAS-X) 125 mg capsule Take 125 mg by mouth four (4) times daily as needed for Flatulence. Yes Provider, Historical   loratadine (CLARITIN) 10 mg tablet Take 10 mg by mouth daily as needed for Allergies. Yes Provider, Historical   lisinopril (PRINIVIL, ZESTRIL) 20 mg tablet Take 20 mg by mouth two (2) times a day. Yes Provider, Historical   fluticasone propionate (FLONASE ALLERGY RELIEF) 50 mcg/actuation nasal spray 2 Sprays by Both Nostrils route daily. Yes Provider, Historical   fluticasone propion-salmeterol (ADVAIR HFA) 230-21 mcg/actuation inhaler Take 2 Puffs by inhalation two (2) times a day. Yes Provider, Historical   tiotropium bromide (SPIRIVA RESPIMAT) 2.5 mcg/actuation inhaler Take 2 Puffs by inhalation daily.    Yes Provider, Historical   NOVOLOG FLEXPEN U-100 INSULIN 100 unit/mL inpn Continue home Sliding scale insulin as prior to admission  Patient taking differently: 1 Units by SubCUTAneous route three (3) times daily. If BG <100=0u  101-150=5u  151-250=8u  251-300=12u  >300 call MD   7/10/18  Yes Virginia Dixon MD   OXYGEN-AIR DELIVERY SYSTEMS 2 L by Nasal route continuous. First Choice   Yes Provider, Historical   aspirin delayed-release 81 mg tablet Take 81 mg by mouth daily. Yes Provider, Historical   montelukast (SINGULAIR) 10 mg tablet Take 10 mg by mouth nightly. Yes Other, MD Lauren   benzonatate (Tessalon Perles) 100 mg capsule Take 1 Cap by mouth three (3) times daily as needed for Cough for up to 7 days. 7/7/20 7/14/20  Evonne Kate PA-C   dexAMETHasone (Decadron) 6 mg tablet Take one tablet PO daily 7/6/20   Madhu Jauregui DO   azithromycin (ZITHROMAX) 250 mg tablet Take 1 Tab by mouth every Monday, Wednesday, Friday. Monday-Friday. 7/3/20   Kobe Kuhn MD   insulin glargine (Basaglar KwikPen U-100 Insulin) 100 unit/mL (3 mL) inpn 45 Units by SubCUTAneous route nightly. Provider, Historical   pantoprazole (PROTONIX) 40 mg tablet Take 1 Tab by mouth daily. 5/27/20   Grace Muse MD   lactobacillus sp. 50 billion cpu (BIO-K PLUS) 50 billion cell -375 mg cap capsule Take 1 Cap by mouth daily. 3/10/20   Grace Muse MD   hydroCHLOROthiazide (HYDRODIURIL) 12.5 mg tablet Take 12.5 mg by mouth daily. Provider, Historical     Allergies   Allergen Reactions    Ancef [Cefazolin] Hives    Contrast Agent [Iodine] Anaphylaxis, Shortness of Breath and Swelling     Needs pre-medication for IV contrast with Benadryl, Solu-Medrol    Fish Containing Products Anaphylaxis     Pt states she had a severe allergic reaction at 7 y/o.  Statins-Hmg-Coa Reductase Inhibitors Myalgia    Metformin Other (comments)     Abdominal pain, diarrhea.     Codeine Other (comments)     Altered mental status      Social History     Tobacco Use    Smoking status: Former Smoker     Packs/day: 1.00     Years: 30.00     Pack years: 30.00     Types: Cigarettes     Start date: 5/6/1966     Last attempt to quit: 2006     Years since quittin.8    Smokeless tobacco: Former User    Tobacco comment: 1-1.5 packs per day   Substance Use Topics    Alcohol use: No      Family History   Problem Relation Age of Onset    Hypertension Mother     Stroke Mother     Breast Cancer Mother         Bilateral mastectomies    Cancer Mother         ovarian and breast    Heart Failure Mother     Heart Attack Father         2011    Heart Surgery Father         CABG    Heart Failure Father     COPD Sister         Heavy smoker    Cancer Sister         ovarian    Heart Failure Sister     Lung Disease Sister     Asthma Child     Cancer Maternal Aunt         pancreatic    Cancer Maternal Grandfather         stomach        Current Facility-Administered Medications   Medication Dose Route Frequency    insulin glargine (LANTUS) injection 30 Units  30 Units SubCUTAneous QHS    arformoteroL (BROVANA) neb solution 15 mcg  15 mcg Nebulization BID RT    budesonide (PULMICORT) 500 mcg/2 ml nebulizer suspension  500 mcg Nebulization BID RT    methylPREDNISolone (PF) (SOLU-MEDROL) injection 40 mg  40 mg IntraVENous Q6H    enoxaparin (LOVENOX) injection 120 mg  1 mg/kg SubCUTAneous Q12H    remdesivir 100 mg in 0.9% sodium chloride 250 mL IVPB  100 mg IntraVENous Q24H    azithromycin (ZITHROMAX) 500 mg in  mL  500 mg IntraVENous Q24H    insulin lispro (HUMALOG) injection   SubCUTAneous AC&HS    ascorbic acid (vitamin C) (VITAMIN C) tablet 500 mg  500 mg Oral BID    cholecalciferol (VITAMIN D3) (1000 Units /25 mcg) tablet 2 Tab  2,000 Units Oral DAILY    zinc sulfate (ZINCATE) 220 (50) mg capsule 1 Cap  1 Cap Oral DAILY    furosemide (LASIX) tablet 20 mg  20 mg Oral DAILY    lisinopriL (PRINIVIL, ZESTRIL) tablet 20 mg  20 mg Oral DAILY    hydroCHLOROthiazide (HYDRODIURIL) tablet 12.5 mg  12.5 mg Oral DAILY    pantoprazole (PROTONIX) tablet 40 mg  40 mg Oral BID    loratadine (CLARITIN) tablet 10 mg  10 mg Oral DAILY  fluticasone propionate (FLONASE) 50 mcg/actuation nasal spray 2 Spray  2 Spray Both Nostrils DAILY    montelukast (SINGULAIR) tablet 10 mg  10 mg Oral QHS       Review of Systems:  Pertinent items are noted in HPI. ROS    Objective:   Vital Signs:    Visit Vitals  /70 (BP 1 Location: Left arm, BP Patient Position: At rest)   Pulse 65   Temp 97.1 °F (36.2 °C)   Resp 18   Ht 5' 3\" (1.6 m)   Wt 121.5 kg (267 lb 13.7 oz)   SpO2 98%   BMI 47.45 kg/m²       O2 Device: Nasal cannula   O2 Flow Rate (L/min): 3 l/min   Temp (24hrs), Av.7 °F (36.5 °C), Min:97.1 °F (36.2 °C), Max:98.4 °F (36.9 °C)       Intake/Output:   Last shift:      No intake/output data recorded. Last 3 shifts: 07/10 1901 -  0700  In: 4588 [P.O.:240; I.V.:700]  Out: 800 [Urine:800]    Intake/Output Summary (Last 24 hours) at 2020 0845  Last data filed at 2020 1493  Gross per 24 hour   Intake 990 ml   Output 800 ml   Net 190 ml      Physical Exam:   General:  Alert, cooperative, no distress, appears stated age. Head:  Normocephalic, without obvious abnormality, atraumatic. Lungs:   Bilateral auscultation decreased air movement, no rales or wheezes   Chest wall:  No tenderness or deformity. NO CREPITUS   Heart:  Regular rate and rhythm, S1, S2 normal, no murmur, click, rub or gallop. Abdomen:   Soft, non-tender. Bowel sounds normal. No masses,  No organomegaly. No paradox   Extremities: normal, atraumatic, no cyanosis or edema.    Neurologic: Grossly nonfocal          Data review:   Labs:  Recent Results (from the past 24 hour(s))   GLUCOSE, POC    Collection Time: 20 12:14 PM   Result Value Ref Range    Glucose (POC) 274 (H) 70 - 110 mg/dL   GLUCOSE, POC    Collection Time: 20  4:36 PM   Result Value Ref Range    Glucose (POC) 242 (H) 70 - 110 mg/dL   GLUCOSE, POC    Collection Time: 20  9:46 PM   Result Value Ref Range    Glucose (POC) 405 (HH) 70 - 110 mg/dL   PROTHROMBIN TIME + INR    Collection Time: 07/12/20  2:48 AM   Result Value Ref Range    Prothrombin time 14.0 11.5 - 15.2 sec    INR 1.1 0.8 - 1.2     PTT    Collection Time: 07/12/20  2:48 AM   Result Value Ref Range    aPTT 42.9 (H) 23.0 - 36.4 SEC   FIBRINOGEN    Collection Time: 07/12/20  2:48 AM   Result Value Ref Range    Fibrinogen 706 (H) 210 - 451 mg/dL   D DIMER    Collection Time: 07/12/20  2:48 AM   Result Value Ref Range    D DIMER 0.35 <0.46 ug/ml(FEU)   C REACTIVE PROTEIN, QT    Collection Time: 07/12/20  2:48 AM   Result Value Ref Range    C-Reactive protein 8.2 (H) 0 - 0.3 mg/dL   FERRITIN    Collection Time: 07/12/20  2:48 AM   Result Value Ref Range    Ferritin 272 8 - 388 NG/ML   PROCALCITONIN    Collection Time: 07/12/20  2:48 AM   Result Value Ref Range    Procalcitonin 4.28 ng/mL   METABOLIC PANEL, COMPREHENSIVE    Collection Time: 07/12/20  2:48 AM   Result Value Ref Range    Sodium 139 136 - 145 mmol/L    Potassium 3.7 3.5 - 5.5 mmol/L    Chloride 104 100 - 111 mmol/L    CO2 28 21 - 32 mmol/L    Anion gap 7 3.0 - 18 mmol/L    Glucose 263 (H) 74 - 99 mg/dL    BUN 21 (H) 7.0 - 18 MG/DL    Creatinine 0.90 0.6 - 1.3 MG/DL    BUN/Creatinine ratio 23 (H) 12 - 20      GFR est AA >60 >60 ml/min/1.73m2    GFR est non-AA >60 >60 ml/min/1.73m2    Calcium 8.9 8.5 - 10.1 MG/DL    Bilirubin, total 0.3 0.2 - 1.0 MG/DL    ALT (SGPT) 59 (H) 13 - 56 U/L    AST (SGOT) 30 10 - 38 U/L    Alk.  phosphatase 88 45 - 117 U/L    Protein, total 6.9 6.4 - 8.2 g/dL    Albumin 2.7 (L) 3.4 - 5.0 g/dL    Globulin 4.2 (H) 2.0 - 4.0 g/dL    A-G Ratio 0.6 (L) 0.8 - 1.7     CBC WITH AUTOMATED DIFF    Collection Time: 07/12/20  2:48 AM   Result Value Ref Range    WBC 5.3 4.6 - 13.2 K/uL    RBC 3.63 (L) 4.20 - 5.30 M/uL    HGB 11.0 (L) 12.0 - 16.0 g/dL    HCT 33.1 (L) 35.0 - 45.0 %    MCV 91.2 74.0 - 97.0 FL    MCH 30.3 24.0 - 34.0 PG    MCHC 33.2 31.0 - 37.0 g/dL    RDW 13.6 11.6 - 14.5 %    PLATELET 331 243 - 484 K/uL    MPV 9.1 (L) 9.2 - 11.8 FL    NEUTROPHILS 85 (H) 40 - 73 %    LYMPHOCYTES 8 (L) 21 - 52 %    MONOCYTES 7 3 - 10 %    EOSINOPHILS 0 0 - 5 %    BASOPHILS 0 0 - 2 %    ABS. NEUTROPHILS 4.5 1.8 - 8.0 K/UL    ABS. LYMPHOCYTES 0.4 (L) 0.9 - 3.6 K/UL    ABS. MONOCYTES 0.4 0.05 - 1.2 K/UL    ABS. EOSINOPHILS 0.0 0.0 - 0.4 K/UL    ABS. BASOPHILS 0.0 0.0 - 0.1 K/UL    DF AUTOMATED     GLUCOSE, POC    Collection Time: 07/12/20  3:32 AM   Result Value Ref Range    Glucose (POC) 274 (H) 70 - 110 mg/dL   GLUCOSE, POC    Collection Time: 07/12/20  8:15 AM   Result Value Ref Range    Glucose (POC) 262 (H) 70 - 110 mg/dL       Imaging:  I have personally reviewed the patients radiographs and have reviewed the reports:  CXR Results  (Last 48 hours)    None        CT Results  (Last 48 hours)    None            High complexity decision making was performed during the evaluation of this patient at high risk for decompensation with multiple organ involvement     Above mentioned total time spent on reviewing the case/medical record/data/notes/EMR/patient examination/documentation/coordinating care with nurse/consultants, exclusive of procedures with complex decision making performed and > 50% time spent in face to face evaluation.      Jeri Poole MD

## 2020-07-13 LAB
ALBUMIN SERPL-MCNC: 2.6 G/DL (ref 3.4–5)
ALBUMIN/GLOB SERPL: 0.7 {RATIO} (ref 0.8–1.7)
ALP SERPL-CCNC: 74 U/L (ref 45–117)
ALT SERPL-CCNC: 45 U/L (ref 13–56)
ANION GAP SERPL CALC-SCNC: 6 MMOL/L (ref 3–18)
APTT PPP: 34.4 SEC (ref 23–36.4)
AST SERPL-CCNC: 13 U/L (ref 10–38)
BACTERIA SPEC CULT: NORMAL
BACTERIA SPEC CULT: NORMAL
BASOPHILS # BLD: 0 K/UL (ref 0–0.1)
BASOPHILS NFR BLD: 0 % (ref 0–2)
BILIRUB SERPL-MCNC: 0.4 MG/DL (ref 0.2–1)
BUN SERPL-MCNC: 23 MG/DL (ref 7–18)
BUN/CREAT SERPL: 27 (ref 12–20)
CALCIUM SERPL-MCNC: 8.9 MG/DL (ref 8.5–10.1)
CHLORIDE SERPL-SCNC: 103 MMOL/L (ref 100–111)
CO2 SERPL-SCNC: 30 MMOL/L (ref 21–32)
CREAT SERPL-MCNC: 0.84 MG/DL (ref 0.6–1.3)
CRP SERPL-MCNC: 3.4 MG/DL (ref 0–0.3)
D DIMER PPP FEU-MCNC: <0.27 UG/ML(FEU)
DIFFERENTIAL METHOD BLD: ABNORMAL
EOSINOPHIL # BLD: 0 K/UL (ref 0–0.4)
EOSINOPHIL NFR BLD: 0 % (ref 0–5)
ERYTHROCYTE [DISTWIDTH] IN BLOOD BY AUTOMATED COUNT: 13.3 % (ref 11.6–14.5)
FERRITIN SERPL-MCNC: 218 NG/ML (ref 8–388)
FIBRINOGEN PPP-MCNC: 613 MG/DL (ref 210–451)
GLOBULIN SER CALC-MCNC: 3.9 G/DL (ref 2–4)
GLUCOSE BLD STRIP.AUTO-MCNC: 318 MG/DL (ref 70–110)
GLUCOSE BLD STRIP.AUTO-MCNC: 323 MG/DL (ref 70–110)
GLUCOSE BLD STRIP.AUTO-MCNC: 377 MG/DL (ref 70–110)
GLUCOSE BLD STRIP.AUTO-MCNC: 404 MG/DL (ref 70–110)
GLUCOSE BLD STRIP.AUTO-MCNC: 415 MG/DL (ref 70–110)
GLUCOSE SERPL-MCNC: 254 MG/DL (ref 74–99)
HCT VFR BLD AUTO: 33 % (ref 35–45)
HGB BLD-MCNC: 11.1 G/DL (ref 12–16)
INR PPP: 1.1 (ref 0.8–1.2)
LYMPHOCYTES # BLD: 0.5 K/UL (ref 0.9–3.6)
LYMPHOCYTES NFR BLD: 7 % (ref 21–52)
MCH RBC QN AUTO: 30.6 PG (ref 24–34)
MCHC RBC AUTO-ENTMCNC: 33.6 G/DL (ref 31–37)
MCV RBC AUTO: 90.9 FL (ref 74–97)
MONOCYTES # BLD: 0.4 K/UL (ref 0.05–1.2)
MONOCYTES NFR BLD: 6 % (ref 3–10)
NEUTS SEG # BLD: 5.8 K/UL (ref 1.8–8)
NEUTS SEG NFR BLD: 87 % (ref 40–73)
PLATELET # BLD AUTO: 331 K/UL (ref 135–420)
PMV BLD AUTO: 9 FL (ref 9.2–11.8)
POTASSIUM SERPL-SCNC: 3.7 MMOL/L (ref 3.5–5.5)
PROCALCITONIN SERPL-MCNC: 0.09 NG/ML
PROT SERPL-MCNC: 6.5 G/DL (ref 6.4–8.2)
PROTHROMBIN TIME: 14.4 SEC (ref 11.5–15.2)
RBC # BLD AUTO: 3.63 M/UL (ref 4.2–5.3)
SERVICE CMNT-IMP: NORMAL
SERVICE CMNT-IMP: NORMAL
SODIUM SERPL-SCNC: 139 MMOL/L (ref 136–145)
WBC # BLD AUTO: 6.7 K/UL (ref 4.6–13.2)

## 2020-07-13 PROCEDURE — 82962 GLUCOSE BLOOD TEST: CPT

## 2020-07-13 PROCEDURE — 74011250636 HC RX REV CODE- 250/636: Performed by: INTERNAL MEDICINE

## 2020-07-13 PROCEDURE — 85025 COMPLETE CBC W/AUTO DIFF WBC: CPT

## 2020-07-13 PROCEDURE — 85379 FIBRIN DEGRADATION QUANT: CPT

## 2020-07-13 PROCEDURE — 74011250637 HC RX REV CODE- 250/637: Performed by: HOSPITALIST

## 2020-07-13 PROCEDURE — 85610 PROTHROMBIN TIME: CPT

## 2020-07-13 PROCEDURE — 84145 PROCALCITONIN (PCT): CPT

## 2020-07-13 PROCEDURE — 74011636637 HC RX REV CODE- 636/637: Performed by: STUDENT IN AN ORGANIZED HEALTH CARE EDUCATION/TRAINING PROGRAM

## 2020-07-13 PROCEDURE — 74011000250 HC RX REV CODE- 250: Performed by: INTERNAL MEDICINE

## 2020-07-13 PROCEDURE — 94660 CPAP INITIATION&MGMT: CPT

## 2020-07-13 PROCEDURE — 85730 THROMBOPLASTIN TIME PARTIAL: CPT

## 2020-07-13 PROCEDURE — 86140 C-REACTIVE PROTEIN: CPT

## 2020-07-13 PROCEDURE — 80053 COMPREHEN METABOLIC PANEL: CPT

## 2020-07-13 PROCEDURE — 85384 FIBRINOGEN ACTIVITY: CPT

## 2020-07-13 PROCEDURE — 65660000000 HC RM CCU STEPDOWN

## 2020-07-13 PROCEDURE — 74011250637 HC RX REV CODE- 250/637: Performed by: STUDENT IN AN ORGANIZED HEALTH CARE EDUCATION/TRAINING PROGRAM

## 2020-07-13 PROCEDURE — 36415 COLL VENOUS BLD VENIPUNCTURE: CPT

## 2020-07-13 PROCEDURE — 74011636637 HC RX REV CODE- 636/637: Performed by: FAMILY MEDICINE

## 2020-07-13 PROCEDURE — 82728 ASSAY OF FERRITIN: CPT

## 2020-07-13 RX ORDER — INSULIN GLARGINE 100 [IU]/ML
40 INJECTION, SOLUTION SUBCUTANEOUS
Status: DISCONTINUED | OUTPATIENT
Start: 2020-07-13 | End: 2020-07-14

## 2020-07-13 RX ADMIN — FLUTICASONE PROPIONATE 2 SPRAY: 50 SPRAY, METERED NASAL at 09:00

## 2020-07-13 RX ADMIN — HYDROCHLOROTHIAZIDE 12.5 MG: 25 TABLET ORAL at 08:49

## 2020-07-13 RX ADMIN — Medication 500 MG: at 08:47

## 2020-07-13 RX ADMIN — INSULIN LISPRO 12 UNITS: 100 INJECTION, SOLUTION INTRAVENOUS; SUBCUTANEOUS at 18:41

## 2020-07-13 RX ADMIN — METHYLPREDNISOLONE SODIUM SUCCINATE 40 MG: 40 INJECTION, POWDER, FOR SOLUTION INTRAMUSCULAR; INTRAVENOUS at 03:37

## 2020-07-13 RX ADMIN — CHOLECALCIFEROL TAB 25 MCG (1000 UNIT) 2 TABLET: 25 TAB at 08:48

## 2020-07-13 RX ADMIN — PANTOPRAZOLE 40 MG: 40 TABLET, DELAYED RELEASE ORAL at 21:46

## 2020-07-13 RX ADMIN — INSULIN LISPRO 15 UNITS: 100 INJECTION, SOLUTION INTRAVENOUS; SUBCUTANEOUS at 21:47

## 2020-07-13 RX ADMIN — INSULIN GLARGINE 40 UNITS: 100 INJECTION, SOLUTION SUBCUTANEOUS at 21:45

## 2020-07-13 RX ADMIN — AZITHROMYCIN MONOHYDRATE 500 MG: 500 INJECTION, POWDER, LYOPHILIZED, FOR SOLUTION INTRAVENOUS at 21:54

## 2020-07-13 RX ADMIN — FUROSEMIDE 20 MG: 20 TABLET ORAL at 08:47

## 2020-07-13 RX ADMIN — METHYLPREDNISOLONE SODIUM SUCCINATE 40 MG: 40 INJECTION, POWDER, FOR SOLUTION INTRAMUSCULAR; INTRAVENOUS at 14:25

## 2020-07-13 RX ADMIN — METHYLPREDNISOLONE SODIUM SUCCINATE 40 MG: 40 INJECTION, POWDER, FOR SOLUTION INTRAMUSCULAR; INTRAVENOUS at 08:47

## 2020-07-13 RX ADMIN — ACETAMINOPHEN 500 MG: 500 TABLET ORAL at 03:37

## 2020-07-13 RX ADMIN — METHYLPREDNISOLONE SODIUM SUCCINATE 40 MG: 40 INJECTION, POWDER, FOR SOLUTION INTRAMUSCULAR; INTRAVENOUS at 21:46

## 2020-07-13 RX ADMIN — PANTOPRAZOLE 40 MG: 40 TABLET, DELAYED RELEASE ORAL at 08:48

## 2020-07-13 RX ADMIN — BENZONATATE 100 MG: 100 CAPSULE ORAL at 14:25

## 2020-07-13 RX ADMIN — LISINOPRIL 20 MG: 20 TABLET ORAL at 08:49

## 2020-07-13 RX ADMIN — INSULIN LISPRO 12 UNITS: 100 INJECTION, SOLUTION INTRAVENOUS; SUBCUTANEOUS at 08:49

## 2020-07-13 RX ADMIN — INSULIN LISPRO 15 UNITS: 100 INJECTION, SOLUTION INTRAVENOUS; SUBCUTANEOUS at 13:53

## 2020-07-13 RX ADMIN — Medication 500 MG: at 21:46

## 2020-07-13 RX ADMIN — ENOXAPARIN SODIUM 40 MG: 40 INJECTION SUBCUTANEOUS at 08:47

## 2020-07-13 RX ADMIN — LORATADINE 10 MG: 10 TABLET ORAL at 08:48

## 2020-07-13 RX ADMIN — MONTELUKAST 10 MG: 10 TABLET, FILM COATED ORAL at 21:46

## 2020-07-13 RX ADMIN — ZINC SULFATE 220 MG (50 MG) CAPSULE 1 CAPSULE: CAPSULE at 08:48

## 2020-07-13 NOTE — PROGRESS NOTES
Discharge planning    Reviewed chart. Patient has home oxygen, trilogy, and personal care aides in home. CM will continue to monitor for transitional needs for a safe discharge.      ANA M Del Rosario, RN  Pager # [phone]  Care Manager

## 2020-07-13 NOTE — PROGRESS NOTES
[facility]  Progress Note    Patient: Nolberto Garber MRN: [id number]   SSN: [id number]  YOB: 1960   Age: 60 y.o. Sex: female      Admit Date: 7/9/2020    LOS: 4 days   Chief Complaint   Patient presents with    Concern For COVID-19 (Coronavirus)    Shortness of Breath       Subjective:     No acute events overnight. Per nursing, the patient is doing well this morning, with no concerns overnight. She said she was sats at 97% on 3l/m NC, and sitting up eating breakfast comfortably. Unable to see patient this morning due to covid infection        Objective:     Visit Vitals  /59 (BP 1 Location: Left leg, BP Patient Position: At rest)   Pulse 72   Temp 97 °F (36.1 °C)   Resp 20   Ht 5' 3\" (1.6 m)   Wt 121.5 kg (267 lb 13.7 oz)   SpO2 99%   BMI 47.45 kg/m²       Physical Exam:   Deferred     Intake and Output:  Current Shift: 07/13 0701 - 07/13 1900  In: 240 [P.O.:240]  Out: -   Last three shifts: 07/11 1901 - 07/13 0700  In: 990 [P.O.:240;  I.V.:500]  Out: 2400 [Urine:2400]    Lab/Data Review:  Recent Results (from the past 12 hour(s))   PROTHROMBIN TIME + INR    Collection Time: 07/13/20  4:56 AM   Result Value Ref Range    Prothrombin time 14.4 11.5 - 15.2 sec    INR 1.1 0.8 - 1.2     PTT    Collection Time: 07/13/20  4:56 AM   Result Value Ref Range    aPTT 34.4 23.0 - 36.4 SEC   FIBRINOGEN    Collection Time: 07/13/20  4:56 AM   Result Value Ref Range    Fibrinogen 613 (H) 210 - 451 mg/dL   D DIMER    Collection Time: 07/13/20  4:56 AM   Result Value Ref Range    D DIMER <0.27 <0.46 ug/ml(FEU)   C REACTIVE PROTEIN, QT    Collection Time: 07/13/20  4:56 AM   Result Value Ref Range    C-Reactive protein 3.4 (H) 0 - 0.3 mg/dL   FERRITIN    Collection Time: 07/13/20  4:56 AM   Result Value Ref Range    Ferritin 218 8 - 388 NG/ML   PROCALCITONIN    Collection Time: 07/13/20  4:56 AM   Result Value Ref Range    Procalcitonin 0.78 ng/mL   METABOLIC PANEL, COMPREHENSIVE Collection Time: 07/13/20  4:56 AM   Result Value Ref Range    Sodium 139 136 - 145 mmol/L    Potassium 3.7 3.5 - 5.5 mmol/L    Chloride 103 100 - 111 mmol/L    CO2 30 21 - 32 mmol/L    Anion gap 6 3.0 - 18 mmol/L    Glucose 254 (H) 74 - 99 mg/dL    BUN 23 (H) 7.0 - 18 MG/DL    Creatinine 0.84 0.6 - 1.3 MG/DL    BUN/Creatinine ratio 27 (H) 12 - 20      GFR est AA >60 >60 ml/min/1.73m2    GFR est non-AA >60 >60 ml/min/1.73m2    Calcium 8.9 8.5 - 10.1 MG/DL    Bilirubin, total 0.4 0.2 - 1.0 MG/DL    ALT (SGPT) 45 13 - 56 U/L    AST (SGOT) 13 10 - 38 U/L    Alk. phosphatase 74 45 - 117 U/L    Protein, total 6.5 6.4 - 8.2 g/dL    Albumin 2.6 (L) 3.4 - 5.0 g/dL    Globulin 3.9 2.0 - 4.0 g/dL    A-G Ratio 0.7 (L) 0.8 - 1.7     CBC WITH AUTOMATED DIFF    Collection Time: 07/13/20  4:56 AM   Result Value Ref Range    WBC 6.7 4.6 - 13.2 K/uL    RBC 3.63 (L) 4.20 - 5.30 M/uL    HGB 11.1 (L) 12.0 - 16.0 g/dL    HCT 33.0 (L) 35.0 - 45.0 %    MCV 90.9 74.0 - 97.0 FL    MCH 30.6 24.0 - 34.0 PG    MCHC 33.6 31.0 - 37.0 g/dL    RDW 13.3 11.6 - 14.5 %    PLATELET 845 657 - 819 K/uL    MPV 9.0 (L) 9.2 - 11.8 FL    NEUTROPHILS 87 (H) 40 - 73 %    LYMPHOCYTES 7 (L) 21 - 52 %    MONOCYTES 6 3 - 10 %    EOSINOPHILS 0 0 - 5 %    BASOPHILS 0 0 - 2 %    ABS. NEUTROPHILS 5.8 1.8 - 8.0 K/UL    ABS. LYMPHOCYTES 0.5 (L) 0.9 - 3.6 K/UL    ABS. MONOCYTES 0.4 0.05 - 1.2 K/UL    ABS. EOSINOPHILS 0.0 0.0 - 0.4 K/UL    ABS. BASOPHILS 0.0 0.0 - 0.1 K/UL    DF AUTOMATED     GLUCOSE, POC    Collection Time: 07/13/20  8:39 AM   Result Value Ref Range    Glucose (POC) 323 (H) 70 - 110 mg/dL       RECENT RESULTS  MODALITY IMPRESSION   XR Results from [place] encounter on 07/09/20   XR CHEST PORT    Narrative Portable CXR:    HISTORY: Respiratory distress. Covid 19    Comparison July 11, 2020 1706 hours    Stable cardiomegaly. No vascular congestion. Mild density in the lung bases,  grossly unchanged. Small pleural effusion.       Impression IMPRESSION: No acute change with bilateral basilar infiltrate/pneumonia. No CHF. CT Results from [place] encounter on 04/08/20   CT ABD PELV WO CONT    Narrative CT ABDOMEN AND PELVIS WITHOUT ENHANCEMENT    INDICATION: Nausea, right lower quadrant pain, vomiting since this morning. TECHNIQUE: Axial images obtained of the abdomen and pelvis without intravenous  contrast. Coronal and sagittal reformatted images of the abdomen and pelvis were  obtained. All CT scans at this facility are performed using dose optimization technique as  appropriate to a performed exam, to include automated exposure control,  adjustment of the mA and/or kV according to patient size (including appropriate  matching first site-specific examinations), or use of iterative reconstruction  technique. COMPARISON: 7/14/2019. Lack of intravenous contrast renders this study suboptimal for evaluating the  solid abdominal organs, vasculature and bowel. ABDOMEN FINDINGS:     Liver: Liver is 22.5 cm craniocaudal with mild decreased density. Spleen: Unremarkable. Pancreas: Unremarkable. Biliary: Cholecystectomy. Bowel: Mild diverticulosis. Similar small bowel in a periumbilical hernia. Peritoneum/ Retroperitoneum: Unremarkable. Lymph Nodes: Unremarkable. Adrenal Glands: Unremarkable. Kidneys: Unremarkable. Vessels: Moderate atherosclerosis. Multifocal stenosis of the visceral ostia. PELVIS FINDINGS:     Bladder/ Pelvic Organs: Unremarkable. Lung Base: Severe coronary arteriosclerosis left coronary artery and LAD. Centrilobular emphysema. Subsegmental atelectasis at the lung bases. Bones/Soft tissues: Degenerative changes bilateral hips. Degenerative disc  disease. Impression IMPRESSION:    Unchanged small bowel containing periumbilical hernia without associated  obstruction. Hepatomegaly and mild steatosis. Mild diverticulosis.   Moderate atherosclerosis with multifocal visceral ostia stenosis. Severe left coronary arteriosclerosis. Emphysema. MRI No results found for this or any previous visit. ULTRASOUND Results from [place] encounter on 04/10/17   US ABD COMP    Impression IMPRESSION:       1. Echogenic mildly enlarged liver, suggestive of hepatic steatosis. No focal  hepatic lesion identified. 2. Status post cholecystectomy. No biliary ductal dilatation. Cardiology Procedures/Testing:  MODALITY RESULTS   EKG Results for orders placed or performed during the hospital encounter of 07/09/20   EKG, 12 LEAD, INITIAL   Result Value Ref Range    Ventricular Rate 86 BPM    Atrial Rate 86 BPM    P-R Interval 136 ms    QRS Duration 86 ms    Q-T Interval 354 ms    QTC Calculation (Bezet) 423 ms    Calculated P Axis 35 degrees    Calculated R Axis 32 degrees    Calculated T Axis 38 degrees    Diagnosis       Normal sinus rhythm  Normal ECG  When compared with ECG of 07-JUL-2020 16:09,  No significant change was found  Confirmed by Rad Rogers MD, Nalini Kelly ([id number]) on 7/10/2020 1:06:31 PM         ECHO 05/21/20   ECHO ADULT COMPLETE 05/25/2020 5/25/2020    Narrative · Image quality for this study was technically difficult. Contrast used:   DEFINITY. · Normal cavity size and systolic function (ejection fraction normal). Moderate concentric hypertrophy. Estimated left ventricular ejection   fraction is 65 - 70%. Visually measured ejection fraction. No regional   wall motion abnormality noted. Moderate (grade 2) left ventricular   diastolic dysfunction. · Mildly dilated left atrium. · Pulmonary hypertension. Pulmonary arterial systolic pressure is 61 mmHg. · Severely elevated central venous pressure (15+ mmHg); IVC diameter is   larger than 21 mm and collapses less than 50% with respiration.         Signed by: Mary Ellen Das MD        Special Testing/Procedures:  MODALITY RESULTS   MICRO All Micro Results     Procedure Component Value Units Date/Time    CULTURE, BLOOD [[id number]] Collected:  07/09/20 1553    Order Status:  Completed Specimen:  Blood Updated:  07/13/20 0831     Special Requests: NO SPECIAL REQUESTS        Culture result: NO GROWTH 4 DAYS       CULTURE, BLOOD [[id number]] Collected:  07/09/20 1543    Order Status:  Completed Specimen:  Blood Updated:  07/13/20 0831     Special Requests: NO SPECIAL REQUESTS        Culture result: NO GROWTH 4 DAYS       CULTURE, URINE [[id number]] Collected:  07/09/20 1916    Order Status:  Completed Specimen:  Urine from Clean catch Updated:  07/11/20 0925     Special Requests: NO SPECIAL REQUESTS        Culture result:       No significant growth, <10,000 CFU/mL               UA No results found for this or any previous visit. PATH none     Telemetry yes   Oxygen NC 3l/m current     Assessment and Plan:     Acute on chronic hypoxic respiratory failure likely 2/2 Covid PNA: Pt w/ positive covid swab (7/7). Pt with severe underlying lung disease, Severe COPD on max therapy w/ strong asthma component. Hospitalized 5x in 2020 for COPD exacerbations. Followed by Dr. Randa Turcios in [place].   -NC and nightly BIPAP. Target sats 88-92  -Continue Brovana, Pulmicort, Solu-medrol, remdesivir   -Continue Azithromycin  -FU pulmonary recs   -Treatment dose Lovenox   -Daily Covid labs: cbc, bmp, ferritin, LD, D-dimer, CRP, coags      Insulin dependent Type 2 Diabete: Home lantus 40u pm and novolog SSI. BS raised this >382>450.   -Increase Lantus 40 U.  -SSI   -Can consider more aggressive treatment if continue to see rising glucose readings. -Accuchecks ACHS  -Diabetic diet     Hypertension, HFpEF: ECHO (5/24/20) DE 97 - 70%. No regional wall motion abnormality. Moderate (grade 2) left ventricular diastolic dysfunction. Mildly dilated left atrium. Pulmonary hypertension. Pulmonary arterial systolic pressure is 61 mmHg. Severely elevated central venous pressure (15+ mmHg); IVC diameter is larger than 21 mm and collapses less than 50% with respiration. SBP in ED elevated  to 140s-170s. Pt on lisinopril 20mg BID and HCTZ 12.5mg daily at home. Pt also on Lasix 20mg daily PRN for lower extremity edema at home. Patient endorsing increased swelling in  Abdomen and legs. - Continue Lasix 20mg daily  - Continue Lisinopril 20mg BID  - Continue HCTZ 12.5 mg daily     GERD: Pt takes omeprazole 40mg daily and pepcid for breakthrough symptoms at home.   -Continue protonix 40mg daily     Morbid obesity  -Diabetic diet     . Diet diabetic   DVT Prophylaxis lovenox   GI Prophylaxis protonix   Code status full   Disposition 1-2 days, when stable. Patient will help guide treatment.       [place]  Relationship: Daughter  [phone]     Alysa Russell MD, PGY-2   [address]   Intern Pager: [phone]   July 13, 2020, 10:18 AM

## 2020-07-13 NOTE — DIABETES MGMT
GLYCEMIC CONTROL PLAN OF CARE     Assessment/Recommendations:  Fasting lab glucose this am 254 mg/dl  Noted Lantus increased to 40 units every bedtime. Continue corrective insulin coverage as needed  Will continue inpatient monitoring. Most recent blood glucose values:    Results for Destiny Johnson (MRN [id number]) as of 7/13/2020 14:19   Ref. Range 7/12/2020 11:31 7/12/2020 15:49 7/12/2020 21:18 7/13/2020 08:39 7/13/2020 12:37   GLUCOSE,FAST - POC Latest Ref Range: 70 - 110 mg/dL 370 (H) 378 (H) 315 (H) 323 (H) 377 (H)     Current A1C of 8.4 % is equivalent to average blood glucose of 194 mg/dl over the past 2-3 months.     Current hospital diabetes medications:   Lantus 40 units every bedtime  Lispro corrective insulin coverage   Previous day's insulin requirements:   lantus 30 units  Lispro 51 units corrective insulin  Home diabetes medications: per 5/2020 note  Basaglar 35 units daily  Novolog tid with meals based on BG readings    Diet:    DIET DIABETIC WITH OPTIONS Consistent Carb 1500-1600kcal; Regular; 2 GM NA (House Low NA); AHA-LOW-CHOL FAT    Education:  ____Refer to Diabetes Education Record             _x__Education not indicated at this time  COVID-19 Isolation    Avery Hernandez, [facility] CDE  Ext [phone]

## 2020-07-13 NOTE — PROGRESS NOTES
0720: Verbal shift change report given to Luci Grace RN (oncoming nurse) by José Maxwell RN (offgoing nurse). Report included the following information SBAR, Kardex, Intake/Output, MAR, Accordion, Recent Results, Med Rec Status and Cardiac Rhythm NSR.

## 2020-07-13 NOTE — PROGRESS NOTES
Infectious Disease progress Note        Reason: Acute hypoxic respiratory failure, confirmed COVID-19 pneumonia    Current abx Prior abx   remdesivir since 7/10/20  Azithromycin since 7/10 Ceftriaxone, vancomycin 7/9/2020-7/10     Lines:       Assessment :      60 y.o. female with PMH of COPD on home O2, IDDM2 (last hemoglobin A1c 8.4 on 5/22/2020), HTN, HFpEF, SHERRIE on CPAP, GERD, allergic rhinitis, presented to ED on 7/9/2020 with fever, sob and cough w/ known covid+.      Chest x-ray 7/9/20: Bibasilar infiltrates     Clinical presentation consistent with acute hypoxic respiratory failure secondary to COVID-19 pneumonia superimposed on underlying COPD    Labs on 7/10-ferritin 166, , CRP 12.9, d-dimer 0.3  Labs on 7/11- ferritin 283, crp 14.5, procalcitonin 0.28, d-dimer 0.33  Labs on 7/13-ferritin 218, CRP 3.4, procalcitonin 0.09, d-dimer less than 0.27    Underlying severe COPD, uncontrolled type 2 diabetes puts patient at high risk of clinical deterioration. S/p remdesivir since 7/10, convalescent plasma on 7/11    Venous duplex 7/10/20 negative for dvt    Clinically better. Improved cough. Comfortable on 3 L oxygen via nasal cannula. Recommendations:    1.  discontinue azithromycin, remdesivir after tomorrow's dose. 2.  Taper steroids per pulmonary   3. Continue adjunctive therapy for COVID-19  4. Continue Lovenox 40 mg subcu every 12 hours. May further reduce the dose to 40 mg subcu every 24 hours in a.m.if continued clinical improvement. I will be on vacation till 7/19/20. Dr. Jacinto Cuba will be covering for me during this time. She can be reached @ [phone]      Above plan was discussed in details with patient, nursing staff, dr. Joe. Please call me if any further questions or concerns. Will continue to participate in the care of this patient. HPI:  Feels better. Improved shortness of breath and cough. Feels short of breath with minimal exertion.   Denies abdominal pain, nausea, vomiting, diarrhea.         home Medication List    Details   fluticasone propionate (Flovent HFA) 220 mcg/actuation inhaler Take 1 Puff by inhalation two (2) times a day. Qty: 1 Inhaler, Refills: 0      albuterol-ipratropium (DUO-NEB) 2.5 mg-0.5 mg/3 ml nebu 3 mL by Nebulization route every four (4) hours as needed for Wheezing. Qty: 30 Nebule, Refills: 0      albuterol (PROVENTIL HFA, VENTOLIN HFA, PROAIR HFA) 90 mcg/actuation inhaler Take 2 Puffs by inhalation every four (4) hours as needed for Wheezing. Qty: 1 Inhaler, Refills: 0      !! predniSONE (DELTASONE) 5 mg tablet Take 1 Tab by mouth daily. Qty: 60 Tab, Refills: 1    Associated Diagnoses: Asthma-COPD overlap syndrome ([facility] Poorly controlled severe persistent asthma with acute exacerbation; Chronic respiratory failure with hypoxia and hypercapnia (HCC); COPD, group D, by GOLD 2017 classification ([facility] Morbid obesity ([facility] Obesity hypoventilation syndrome ([facility] SHERRIE (obstructive sleep apnea); Current chronic use of systemic steroids; Chronic rhinitis; Dyspnea on exertion; Shortness of breath; Personal history of tobacco use, presenting hazards to health      omeprazole (PRILOSEC) 40 mg capsule Take  by mouth. !! insulin glargine (LANTUS,BASAGLAR) 100 unit/mL (3 mL) inpn 40 Units by SubCUTAneous route nightly. Please inject 40 units every bedtime. Indications: type 2 diabetes mellitus  Qty: 28 Units, Refills: 0      !! predniSONE (DELTASONE) 20 mg tablet Please take 60mg Prednisone(3 tabs) for 7 days, 50mg (2 1/2 tabs) for 7 days, 40mg(2 tabs) for 7 days, 30mg (1 1/2 tabs) for 7 days, 20mg (1 tabs) for 7 days, 10mg (1/2 tab) for 7 days, then take 5 mg daily from then on wards. Qty: 80 Tab, Refills: 0      furosemide (Lasix) 20 mg tablet Take 20 mg by mouth daily. simethicone (GAS-X) 125 mg capsule Take 125 mg by mouth four (4) times daily as needed for Flatulence.       loratadine (CLARITIN) 10 mg tablet Take 10 mg by mouth daily as needed for Allergies. lisinopril (PRINIVIL, ZESTRIL) 20 mg tablet Take 20 mg by mouth two (2) times a day. fluticasone propionate (FLONASE ALLERGY RELIEF) 50 mcg/actuation nasal spray 2 Sprays by Both Nostrils route daily. fluticasone propion-salmeterol (ADVAIR HFA) 230-21 mcg/actuation inhaler Take 2 Puffs by inhalation two (2) times a day. tiotropium bromide (SPIRIVA RESPIMAT) 2.5 mcg/actuation inhaler Take 2 Puffs by inhalation daily. NOVOLOG FLEXPEN U-100 INSULIN 100 unit/mL inpn Continue home Sliding scale insulin as prior to admission  Qty: 1 Pen, Refills: 0      OXYGEN-AIR DELIVERY SYSTEMS 2 L by Nasal route continuous. First Choice      aspirin delayed-release 81 mg tablet Take 81 mg by mouth daily. montelukast (SINGULAIR) 10 mg tablet Take 10 mg by mouth nightly. benzonatate (Tessalon Perles) 100 mg capsule Take 1 Cap by mouth three (3) times daily as needed for Cough for up to 7 days. Qty: 30 Cap, Refills: 0      dexAMETHasone (Decadron) 6 mg tablet Take one tablet PO daily  Qty: 5 Tab, Refills: 0      azithromycin (ZITHROMAX) 250 mg tablet Take 1 Tab by mouth every Monday, Wednesday, Friday. Monday-Friday. Qty: 30 Tab, Refills: 0    Associated Diagnoses: COPD, group D, by GOLD 2017 classification ([facility]      ! ! insulin glargine (Basaglar KwikPen U-100 Insulin) 100 unit/mL (3 mL) inpn 45 Units by SubCUTAneous route nightly. pantoprazole (PROTONIX) 40 mg tablet Take 1 Tab by mouth daily. Qty: 30 Tab, Refills: 0      lactobacillus sp. 50 billion cpu (BIO-K PLUS) 50 billion cell -375 mg cap capsule Take 1 Cap by mouth daily. Qty: 30 Cap, Refills: 0      hydroCHLOROthiazide (HYDRODIURIL) 12.5 mg tablet Take 12.5 mg by mouth daily. !! - Potential duplicate medications found. Please discuss with provider.           Current Facility-Administered Medications   Medication Dose Route Frequency    insulin glargine (LANTUS) injection 40 Units  40 Units SubCUTAneous QHS    enoxaparin (LOVENOX) injection 40 mg  40 mg SubCUTAneous Q12H    benzonatate (TESSALON) capsule 100 mg  100 mg Oral Q8H PRN    dextrose 10% infusion 125-250 mL  125-250 mL IntraVENous PRN    albuterol (PROVENTIL HFA, VENTOLIN HFA, PROAIR HFA) inhaler 2 Puff  2 Puff Inhalation Q4H PRN    arformoteroL (BROVANA) neb solution 15 mcg  15 mcg Nebulization BID RT    budesonide (PULMICORT) 500 mcg/2 ml nebulizer suspension  500 mcg Nebulization BID RT    methylPREDNISolone (PF) (SOLU-MEDROL) injection 40 mg  40 mg IntraVENous Q6H    0.9% sodium chloride infusion 250 mL  250 mL IntraVENous PRN    remdesivir 100 mg in 0.9% sodium chloride 250 mL IVPB  100 mg IntraVENous Q24H    azithromycin (ZITHROMAX) 500 mg in  mL  500 mg IntraVENous Q24H    sodium chloride (NS) flush 5-10 mL  5-10 mL IntraVENous PRN    insulin lispro (HUMALOG) injection   SubCUTAneous AC&HS    glucose chewable tablet 16 g  4 Tab Oral PRN    glucagon (GLUCAGEN) injection 1 mg  1 mg IntraMUSCular PRN    albuterol-ipratropium (DUO-NEB) 2.5 MG-0.5 MG/3 ML  3 mL Nebulization Q4H PRN    albuterol (ACCUNEB) nebulizer solution 1.25 mg  1.25 mg Nebulization Q4H PRN    acetaminophen (TYLENOL) tablet 500 mg  500 mg Oral Q6H PRN    Or    acetaminophen (TYLENOL) suppository 500 mg  500 mg Rectal Q6H PRN    ascorbic acid (vitamin C) (VITAMIN C) tablet 500 mg  500 mg Oral BID    cholecalciferol (VITAMIN D3) (1000 Units /25 mcg) tablet 2 Tab  2,000 Units Oral DAILY    zinc sulfate (ZINCATE) 220 (50) mg capsule 1 Cap  1 Cap Oral DAILY    furosemide (LASIX) tablet 20 mg  20 mg Oral DAILY    lisinopriL (PRINIVIL, ZESTRIL) tablet 20 mg  20 mg Oral DAILY    hydroCHLOROthiazide (HYDRODIURIL) tablet 12.5 mg  12.5 mg Oral DAILY    pantoprazole (PROTONIX) tablet 40 mg  40 mg Oral BID    loratadine (CLARITIN) tablet 10 mg  10 mg Oral DAILY    fluticasone propionate (FLONASE) 50 mcg/actuation nasal spray 2 Spray  2 Spray Both Nostrils DAILY  montelukast (SINGULAIR) tablet 10 mg  10 mg Oral QHS       Allergies: Ancef [cefazolin]; Contrast agent [iodine]; Fish containing products; Statins-hmg-coa reductase inhibitors; Metformin; and Codeine    Temp (24hrs), Av.8 °F (36.6 °C), Min:97 °F (36.1 °C), Max:98.7 °F (37.1 °C)    Visit Vitals  /59 (BP 1 Location: Left leg, BP Patient Position: At rest)   Pulse 72   Temp 97 °F (36.1 °C)   Resp 20   Ht 5' 3\" (1.6 m)   Wt 121.5 kg (267 lb 13.7 oz)   SpO2 99%   BMI 47.45 kg/m²       ROS: Point review of systems obtained in details. Pertinent positive as mentioned in history of present illness otherwise negative  Physical Exam:    General:  Alert, sitting on the bed, BiPAP mask off the face on 3 L oxygen by nasal cannula   Head:  Normocephalic, without obvious abnormality, atraumatic. Lungs:    Bilateral chest movement equal, auscultation deferred   Chest wall:  No deformity. Heart:  Regular rate and rhythm   Abdomen:    non-distended   Extremities: normal, atraumatic, no cyanosis or edema. Neurologic: Grossly nonfocal  Psychiatry: appropriate mood and affect         Labs: Results:   Chemistry Recent Labs     20   * 263* 239*    139 140   K 3.7 3.7 4.5    104 105   CO2 30 28 28   BUN 23* 21* 13   CREA 0.84 0.90 0.85   CA 8.9 8.9 8.2*   AGAP 6 7 7   BUCR 27* 23* 15   AP 74 88 97   TP 6.5 6.9 6.3*   ALB 2.6* 2.7* 2.9*   GLOB 3.9 4.2* 3.4   AGRAT 0.7* 0.6* 0.9      CBC w/Diff Recent Labs     20   WBC 6.7 5.3 5.7   RBC 3.63* 3.63* 3.52*   HGB 11.1* 11.0* 10.9*   HCT 33.0* 33.1* 33.0*    274 248   GRANS 87* 85*  --    LYMPH 7* 8*  --    EOS 0 0  --       Microbiology No results for input(s): CULT in the last 72 hours.        RADIOLOGY:    All available imaging studies/reports in [facility] for this admission were reviewed      Dr. Slime Machado, Infectious Disease Specialist  [phone]  July 13, 2020  2:51 PM

## 2020-07-13 NOTE — ROUTINE PROCESS
Bedside and Verbal shift change report given to [address] [place] [address] (oncoming nurse) by Richy Vela RN   (offgoing nurse). Report given with SBAR, Kardex, Intake/Output, MAR, Accordion and Recent Results.

## 2020-07-13 NOTE — PROGRESS NOTES
Problem: Falls - Risk of  Goal: *Absence of Falls  Description: Document Mahnaz Marques Fall Risk and appropriate interventions in the flowsheet. Outcome: Progressing Towards Goal  Note: Fall Risk Interventions:  Mobility Interventions: Bed/chair exit alarm         Medication Interventions: Patient to call before getting OOB, Teach patient to arise slowly    Elimination Interventions: Call light in reach, Toilet paper/wipes in reach, Toileting schedule/hourly rounds              Problem: Patient Education: Go to Patient Education Activity  Goal: Patient/Family Education  Outcome: Progressing Towards Goal     Problem: Airway Clearance - Ineffective  Goal: Achieve or maintain patent airway  Outcome: Progressing Towards Goal     Problem: Gas Exchange - Impaired  Goal: Absence of hypoxia  Outcome: Progressing Towards Goal  Goal: Promote optimal lung function  Outcome: Progressing Towards Goal     Problem: Breathing Pattern - Ineffective  Goal: Ability to achieve and maintain a regular respiratory rate  Outcome: Progressing Towards Goal     Problem:  Body Temperature -  Risk of, Imbalanced  Goal: Ability to maintain a body temperature within defined limits  Outcome: Progressing Towards Goal  Goal: Will regain or maintain usual level of consciousness  Outcome: Progressing Towards Goal  Goal: Complications related to the disease process, condition or treatment will be avoided or minimized  Outcome: Progressing Towards Goal     Problem: Isolation Precautions - Risk of Spread of Infection  Goal: Prevent transmission of infectious organism to others  Outcome: Progressing Towards Goal     Problem: Nutrition Deficits  Goal: Optimize nutrtional status  Outcome: Progressing Towards Goal     Problem: Risk for Fluid Volume Deficit  Goal: Maintain normal heart rhythm  Outcome: Progressing Towards Goal  Goal: Maintain absence of muscle cramping  Outcome: Progressing Towards Goal  Goal: Maintain normal serum potassium, sodium, calcium, phosphorus, and pH  Outcome: Progressing Towards Goal     Problem: Loneliness or Risk for Loneliness  Goal: Demonstrate positive use of time alone when socialization is not possible  Outcome: Progressing Towards Goal     Problem: Fatigue  Goal: Verbalize increase energy and improved vitality  Outcome: Progressing Towards Goal     Problem: Patient Education: Go to Patient Education Activity  Goal: Patient/Family Education  Outcome: Progressing Towards Goal     Problem: Pressure Injury - Risk of  Goal: *Prevention of pressure injury  Description: Document Viktor Scale and appropriate interventions in the flowsheet.   Outcome: Progressing Towards Goal  Note: Pressure Injury Interventions:  Sensory Interventions: Assess changes in LOC    Moisture Interventions: Absorbent underpads    Activity Interventions: Pressure redistribution bed/mattress(bed type)    Mobility Interventions: HOB 30 degrees or less    Nutrition Interventions: Document food/fluid/supplement intake    Friction and Shear Interventions: HOB 30 degrees or less, Foam dressings/transparent film/skin sealants                Problem: Patient Education: Go to Patient Education Activity  Goal: Patient/Family Education  Outcome: Progressing Towards Goal

## 2020-07-14 LAB
ALBUMIN SERPL-MCNC: 2.6 G/DL (ref 3.4–5)
ALBUMIN/GLOB SERPL: 0.7 {RATIO} (ref 0.8–1.7)
ALP SERPL-CCNC: 77 U/L (ref 45–117)
ALT SERPL-CCNC: 41 U/L (ref 13–56)
ANION GAP SERPL CALC-SCNC: 6 MMOL/L (ref 3–18)
APTT PPP: 26.4 SEC (ref 23–36.4)
AST SERPL-CCNC: 11 U/L (ref 10–38)
BASOPHILS # BLD: 0 K/UL (ref 0–0.1)
BASOPHILS NFR BLD: 0 % (ref 0–2)
BILIRUB SERPL-MCNC: 0.5 MG/DL (ref 0.2–1)
BUN SERPL-MCNC: 29 MG/DL (ref 7–18)
BUN/CREAT SERPL: 29 (ref 12–20)
CALCIUM SERPL-MCNC: 9.2 MG/DL (ref 8.5–10.1)
CHLORIDE SERPL-SCNC: 99 MMOL/L (ref 100–111)
CO2 SERPL-SCNC: 33 MMOL/L (ref 21–32)
CREAT SERPL-MCNC: 1 MG/DL (ref 0.6–1.3)
CRP SERPL-MCNC: 1.9 MG/DL (ref 0–0.3)
D DIMER PPP FEU-MCNC: 0.28 UG/ML(FEU)
DIFFERENTIAL METHOD BLD: ABNORMAL
EOSINOPHIL # BLD: 0 K/UL (ref 0–0.4)
EOSINOPHIL NFR BLD: 0 % (ref 0–5)
ERYTHROCYTE [DISTWIDTH] IN BLOOD BY AUTOMATED COUNT: 13.3 % (ref 11.6–14.5)
FERRITIN SERPL-MCNC: 160 NG/ML (ref 8–388)
FIBRINOGEN PPP-MCNC: 610 MG/DL (ref 210–451)
GLOBULIN SER CALC-MCNC: 4 G/DL (ref 2–4)
GLUCOSE BLD STRIP.AUTO-MCNC: 326 MG/DL (ref 70–110)
GLUCOSE BLD STRIP.AUTO-MCNC: 349 MG/DL (ref 70–110)
GLUCOSE BLD STRIP.AUTO-MCNC: 391 MG/DL (ref 70–110)
GLUCOSE BLD STRIP.AUTO-MCNC: 409 MG/DL (ref 70–110)
GLUCOSE SERPL-MCNC: 331 MG/DL (ref 74–99)
HCT VFR BLD AUTO: 34.1 % (ref 35–45)
HGB BLD-MCNC: 11.3 G/DL (ref 12–16)
INR PPP: 1.2 (ref 0.8–1.2)
LYMPHOCYTES # BLD: 0.8 K/UL (ref 0.9–3.6)
LYMPHOCYTES NFR BLD: 12 % (ref 21–52)
MCH RBC QN AUTO: 30.1 PG (ref 24–34)
MCHC RBC AUTO-ENTMCNC: 33.1 G/DL (ref 31–37)
MCV RBC AUTO: 90.9 FL (ref 74–97)
MONOCYTES # BLD: 0.2 K/UL (ref 0.05–1.2)
MONOCYTES NFR BLD: 3 % (ref 3–10)
NEUTS SEG # BLD: 5.7 K/UL (ref 1.8–8)
NEUTS SEG NFR BLD: 85 % (ref 40–73)
PLATELET # BLD AUTO: 351 K/UL (ref 135–420)
PMV BLD AUTO: 8.9 FL (ref 9.2–11.8)
POTASSIUM SERPL-SCNC: 3.8 MMOL/L (ref 3.5–5.5)
PROCALCITONIN SERPL-MCNC: <0.05 NG/ML
PROT SERPL-MCNC: 6.6 G/DL (ref 6.4–8.2)
PROTHROMBIN TIME: 14.8 SEC (ref 11.5–15.2)
RBC # BLD AUTO: 3.75 M/UL (ref 4.2–5.3)
SODIUM SERPL-SCNC: 138 MMOL/L (ref 136–145)
WBC # BLD AUTO: 6.7 K/UL (ref 4.6–13.2)

## 2020-07-14 PROCEDURE — 74011636637 HC RX REV CODE- 636/637: Performed by: STUDENT IN AN ORGANIZED HEALTH CARE EDUCATION/TRAINING PROGRAM

## 2020-07-14 PROCEDURE — 74011000258 HC RX REV CODE- 258: Performed by: INTERNAL MEDICINE

## 2020-07-14 PROCEDURE — 74011250637 HC RX REV CODE- 250/637: Performed by: FAMILY MEDICINE

## 2020-07-14 PROCEDURE — 74011250636 HC RX REV CODE- 250/636: Performed by: INTERNAL MEDICINE

## 2020-07-14 PROCEDURE — 85610 PROTHROMBIN TIME: CPT

## 2020-07-14 PROCEDURE — 65660000000 HC RM CCU STEPDOWN

## 2020-07-14 PROCEDURE — 82728 ASSAY OF FERRITIN: CPT

## 2020-07-14 PROCEDURE — 82962 GLUCOSE BLOOD TEST: CPT

## 2020-07-14 PROCEDURE — 85730 THROMBOPLASTIN TIME PARTIAL: CPT

## 2020-07-14 PROCEDURE — 74011250637 HC RX REV CODE- 250/637: Performed by: STUDENT IN AN ORGANIZED HEALTH CARE EDUCATION/TRAINING PROGRAM

## 2020-07-14 PROCEDURE — 85384 FIBRINOGEN ACTIVITY: CPT

## 2020-07-14 PROCEDURE — 77010033678 HC OXYGEN DAILY

## 2020-07-14 PROCEDURE — 85025 COMPLETE CBC W/AUTO DIFF WBC: CPT

## 2020-07-14 PROCEDURE — 74011000250 HC RX REV CODE- 250: Performed by: INTERNAL MEDICINE

## 2020-07-14 PROCEDURE — 74011250636 HC RX REV CODE- 250/636: Performed by: STUDENT IN AN ORGANIZED HEALTH CARE EDUCATION/TRAINING PROGRAM

## 2020-07-14 PROCEDURE — 80053 COMPREHEN METABOLIC PANEL: CPT

## 2020-07-14 PROCEDURE — 36415 COLL VENOUS BLD VENIPUNCTURE: CPT

## 2020-07-14 PROCEDURE — 74011636637 HC RX REV CODE- 636/637: Performed by: FAMILY MEDICINE

## 2020-07-14 PROCEDURE — 94762 N-INVAS EAR/PLS OXIMTRY CONT: CPT

## 2020-07-14 PROCEDURE — 86140 C-REACTIVE PROTEIN: CPT

## 2020-07-14 PROCEDURE — 74011250637 HC RX REV CODE- 250/637: Performed by: HOSPITALIST

## 2020-07-14 PROCEDURE — 85379 FIBRIN DEGRADATION QUANT: CPT

## 2020-07-14 PROCEDURE — 5A09357 ASSISTANCE WITH RESPIRATORY VENTILATION, LESS THAN 24 CONSECUTIVE HOURS, CONTINUOUS POSITIVE AIRWAY PRESSURE: ICD-10-PCS | Performed by: INTERNAL MEDICINE

## 2020-07-14 PROCEDURE — 84145 PROCALCITONIN (PCT): CPT

## 2020-07-14 RX ORDER — INSULIN GLARGINE 100 [IU]/ML
5 INJECTION, SOLUTION SUBCUTANEOUS ONCE
Status: COMPLETED | OUTPATIENT
Start: 2020-07-14 | End: 2020-07-14

## 2020-07-14 RX ORDER — INSULIN GLARGINE 100 [IU]/ML
45 INJECTION, SOLUTION SUBCUTANEOUS
Status: DISCONTINUED | OUTPATIENT
Start: 2020-07-14 | End: 2020-07-18 | Stop reason: HOSPADM

## 2020-07-14 RX ORDER — BUDESONIDE AND FORMOTEROL FUMARATE DIHYDRATE 160; 4.5 UG/1; UG/1
2 AEROSOL RESPIRATORY (INHALATION)
Status: DISCONTINUED | OUTPATIENT
Start: 2020-07-14 | End: 2020-07-18 | Stop reason: HOSPADM

## 2020-07-14 RX ADMIN — INSULIN LISPRO 12 UNITS: 100 INJECTION, SOLUTION INTRAVENOUS; SUBCUTANEOUS at 21:18

## 2020-07-14 RX ADMIN — INSULIN LISPRO 12 UNITS: 100 INJECTION, SOLUTION INTRAVENOUS; SUBCUTANEOUS at 07:52

## 2020-07-14 RX ADMIN — PANTOPRAZOLE 40 MG: 40 TABLET, DELAYED RELEASE ORAL at 21:20

## 2020-07-14 RX ADMIN — BENZONATATE 100 MG: 100 CAPSULE ORAL at 23:42

## 2020-07-14 RX ADMIN — REMDESIVIR 100 MG: 100 INJECTION, POWDER, LYOPHILIZED, FOR SOLUTION INTRAVENOUS at 00:25

## 2020-07-14 RX ADMIN — ENOXAPARIN SODIUM 40 MG: 40 INJECTION SUBCUTANEOUS at 09:04

## 2020-07-14 RX ADMIN — PANTOPRAZOLE 40 MG: 40 TABLET, DELAYED RELEASE ORAL at 09:03

## 2020-07-14 RX ADMIN — LISINOPRIL 20 MG: 20 TABLET ORAL at 09:04

## 2020-07-14 RX ADMIN — AZITHROMYCIN MONOHYDRATE 500 MG: 500 INJECTION, POWDER, LYOPHILIZED, FOR SOLUTION INTRAVENOUS at 23:47

## 2020-07-14 RX ADMIN — ENOXAPARIN SODIUM 40 MG: 40 INJECTION SUBCUTANEOUS at 23:43

## 2020-07-14 RX ADMIN — ACETAMINOPHEN 500 MG: 500 TABLET ORAL at 03:35

## 2020-07-14 RX ADMIN — CHOLECALCIFEROL TAB 25 MCG (1000 UNIT) 2 TABLET: 25 TAB at 09:03

## 2020-07-14 RX ADMIN — INSULIN GLARGINE 5 UNITS: 100 INJECTION, SOLUTION SUBCUTANEOUS at 12:12

## 2020-07-14 RX ADMIN — BUDESONIDE AND FORMOTEROL FUMARATE DIHYDRATE 2 PUFF: 160; 4.5 AEROSOL RESPIRATORY (INHALATION) at 20:00

## 2020-07-14 RX ADMIN — INSULIN LISPRO 15 UNITS: 100 INJECTION, SOLUTION INTRAVENOUS; SUBCUTANEOUS at 15:47

## 2020-07-14 RX ADMIN — LORATADINE 10 MG: 10 TABLET ORAL at 09:03

## 2020-07-14 RX ADMIN — Medication 500 MG: at 21:20

## 2020-07-14 RX ADMIN — METHYLPREDNISOLONE SODIUM SUCCINATE 40 MG: 40 INJECTION, POWDER, FOR SOLUTION INTRAMUSCULAR; INTRAVENOUS at 21:23

## 2020-07-14 RX ADMIN — MONTELUKAST 10 MG: 10 TABLET, FILM COATED ORAL at 21:20

## 2020-07-14 RX ADMIN — INSULIN LISPRO 15 UNITS: 100 INJECTION, SOLUTION INTRAVENOUS; SUBCUTANEOUS at 11:17

## 2020-07-14 RX ADMIN — HYDROCHLOROTHIAZIDE 12.5 MG: 25 TABLET ORAL at 09:03

## 2020-07-14 RX ADMIN — METHYLPREDNISOLONE SODIUM SUCCINATE 40 MG: 40 INJECTION, POWDER, FOR SOLUTION INTRAMUSCULAR; INTRAVENOUS at 03:35

## 2020-07-14 RX ADMIN — REMDESIVIR 100 MG: 100 INJECTION, POWDER, LYOPHILIZED, FOR SOLUTION INTRAVENOUS at 21:22

## 2020-07-14 RX ADMIN — METHYLPREDNISOLONE SODIUM SUCCINATE 40 MG: 40 INJECTION, POWDER, FOR SOLUTION INTRAMUSCULAR; INTRAVENOUS at 09:03

## 2020-07-14 RX ADMIN — FLUTICASONE PROPIONATE 2 SPRAY: 50 SPRAY, METERED NASAL at 09:00

## 2020-07-14 RX ADMIN — ACETAMINOPHEN 500 MG: 500 TABLET ORAL at 23:42

## 2020-07-14 RX ADMIN — FUROSEMIDE 20 MG: 20 TABLET ORAL at 09:03

## 2020-07-14 RX ADMIN — Medication 500 MG: at 09:03

## 2020-07-14 RX ADMIN — ZINC SULFATE 220 MG (50 MG) CAPSULE 1 CAPSULE: CAPSULE at 09:03

## 2020-07-14 RX ADMIN — INSULIN GLARGINE 45 UNITS: 100 INJECTION, SOLUTION SUBCUTANEOUS at 21:17

## 2020-07-14 NOTE — ROUTINE PROCESS
Received verbal report from [address] [place] [address], report included SBAR, MAR, and Kardex. Gave verbal report to Keeley Lange RN, report included SBAR, MAR, and Kardex.

## 2020-07-14 NOTE — PROGRESS NOTES
Problem: Falls - Risk of  Goal: *Absence of Falls  Description: Document Jennifer Vargas Fall Risk and appropriate interventions in the flowsheet. Outcome: Progressing Towards Goal  Note: Fall Risk Interventions:  Mobility Interventions: Bed/chair exit alarm         Medication Interventions: Patient to call before getting OOB    Elimination Interventions: Call light in reach              Problem: Patient Education: Go to Patient Education Activity  Goal: Patient/Family Education  Outcome: Progressing Towards Goal     Problem: Airway Clearance - Ineffective  Goal: Achieve or maintain patent airway  Outcome: Progressing Towards Goal     Problem: Gas Exchange - Impaired  Goal: Absence of hypoxia  Outcome: Progressing Towards Goal  Goal: Promote optimal lung function  Outcome: Progressing Towards Goal     Problem: Breathing Pattern - Ineffective  Goal: Ability to achieve and maintain a regular respiratory rate  Outcome: Progressing Towards Goal     Problem:  Body Temperature -  Risk of, Imbalanced  Goal: Ability to maintain a body temperature within defined limits  Outcome: Progressing Towards Goal  Goal: Will regain or maintain usual level of consciousness  Outcome: Progressing Towards Goal  Goal: Complications related to the disease process, condition or treatment will be avoided or minimized  Outcome: Progressing Towards Goal     Problem: Isolation Precautions - Risk of Spread of Infection  Goal: Prevent transmission of infectious organism to others  Outcome: Progressing Towards Goal     Problem: Nutrition Deficits  Goal: Optimize nutrtional status  Outcome: Progressing Towards Goal     Problem: Risk for Fluid Volume Deficit  Goal: Maintain normal heart rhythm  Outcome: Progressing Towards Goal  Goal: Maintain absence of muscle cramping  Outcome: Progressing Towards Goal  Goal: Maintain normal serum potassium, sodium, calcium, phosphorus, and pH  Outcome: Progressing Towards Goal     Problem: Loneliness or Risk for Loneliness  Goal: Demonstrate positive use of time alone when socialization is not possible  Outcome: Progressing Towards Goal     Problem: Fatigue  Goal: Verbalize increase energy and improved vitality  Outcome: Progressing Towards Goal     Problem: Patient Education: Go to Patient Education Activity  Goal: Patient/Family Education  Outcome: Progressing Towards Goal     Problem: Pressure Injury - Risk of  Goal: *Prevention of pressure injury  Description: Document Viktor Scale and appropriate interventions in the flowsheet.   Outcome: Progressing Towards Goal  Note: Pressure Injury Interventions:  Sensory Interventions: Assess changes in LOC    Moisture Interventions: Absorbent underpads    Activity Interventions: Pressure redistribution bed/mattress(bed type)    Mobility Interventions: HOB 30 degrees or less    Nutrition Interventions: Document food/fluid/supplement intake    Friction and Shear Interventions: HOB 30 degrees or less                Problem: Patient Education: Go to Patient Education Activity  Goal: Patient/Family Education  Outcome: Progressing Towards Goal

## 2020-07-14 NOTE — DIABETES MGMT
Glycemic Control Plan of Care    T2DM with A1c of 8.4% (5/22/2020). Home diabetes medications:   Basaglar (lantus) pen insulin 40 units daily  Novolog slidings scale insulin    Patient is currently on IV Solumedrol 40 mg every 12 hours. POC BG values on 07/13/2020: 323, 377, 318, 404, 415. POC BG values on 07/14/2020 at time of review: 326, 391. Increased basal lantus insulin dose from 40 to 45 units daily. Recommendation(s): increased basal lantus insulin dose as ordered today     Current hospital diabetes medications:  Basal lantus insulin 45 units daily at bedtime. Correctional lispro insulin ACHS. Very resistant dose    Total daily dose insulin requirement previous day: 07/13/2020  Lantus: 40 units  Lispro: 54 units  TDD insulin: 94 units    Assessment:  Patient is 60year old with PMH including type 2 diabetes mellitus, severe COPD, asthma, hypertension, and sleep apnea - was admitted on 07/09/2020 with report of diarrhea, myalgias, fevers, headache and difficulty breathing. Noted:  Acute on chronic respiratory failure  COVID-19 pneumonia  Severe asthma/COPD  T2DM    Most recent blood glucose values:        Current A1C:     Lab Results   Component Value Date/Time    Hemoglobin A1c 8.4 (H) 05/22/2020 05:56 AM     This lab test reflects the patient's estimated average blood glucose of 194 mg/dL during the previous 3 months.       Diet: Diabetic consistent carb 1500-1600kcal; regular; 2 Gm NA    Goals:  Blood glucose will be within target range of  mg/dL by 07/17/2020    Education:  ___  Refer to Diabetes Education Record             _X__  Education not indicated at this time    Elena Romero RN [facility]  Pager: [phone]

## 2020-07-14 NOTE — PROGRESS NOTES
[facility]  Progress Note    Patient: Anh Hardwick MRN: [id number]   SSN: [id number]  YOB: 1960   Age: 60 y.o. Sex: female      Admit Date: 7/9/2020    LOS: 5 days   Chief Complaint   Patient presents with    Concern For COVID-19 (Coronavirus)    Shortness of Breath       Subjective:     Per nurse, patient was well today, walking and talking and eating without much issue, continuing to stay on 3 lpm. VSS, and patient seems well overall. Will defer full exam and Physical due to COVID    ROS as in subjective    Objective:     Visit Vitals  /66 (BP 1 Location: Left arm)   Pulse 65   Temp 98.2 °F (36.8 °C)   Resp 20   Ht 5' 3\" (1.6 m)   Wt 121.5 kg (267 lb 13.7 oz)   SpO2 96%   BMI 47.45 kg/m²       Physical Exam:   Deferred     Intake and Output:  Current Shift: No intake/output data recorded. Last three shifts: 07/12 1901 - 07/14 0700  In: 1300 [P.O.:800;  I.V.:500]  Out: 4900 [Urine:4900]    Lab/Data Review:  Recent Results (from the past 12 hour(s))   PROTHROMBIN TIME + INR    Collection Time: 07/14/20  3:22 AM   Result Value Ref Range    Prothrombin time 14.8 11.5 - 15.2 sec    INR 1.2 0.8 - 1.2     PTT    Collection Time: 07/14/20  3:22 AM   Result Value Ref Range    aPTT 26.4 23.0 - 36.4 SEC   FIBRINOGEN    Collection Time: 07/14/20  3:22 AM   Result Value Ref Range    Fibrinogen 610 (H) 210 - 451 mg/dL   D DIMER    Collection Time: 07/14/20  3:22 AM   Result Value Ref Range    D DIMER 0.28 <0.46 ug/ml(FEU)   C REACTIVE PROTEIN, QT    Collection Time: 07/14/20  3:22 AM   Result Value Ref Range    C-Reactive protein 1.9 (H) 0 - 0.3 mg/dL   FERRITIN    Collection Time: 07/14/20  3:22 AM   Result Value Ref Range    Ferritin 160 8 - 388 NG/ML   PROCALCITONIN    Collection Time: 07/14/20  3:22 AM   Result Value Ref Range    Procalcitonin <7.24 ng/mL   METABOLIC PANEL, COMPREHENSIVE    Collection Time: 07/14/20  3:22 AM   Result Value Ref Range    Sodium 138 136 - 145 mmol/L    Potassium 3.8 3.5 - 5.5 mmol/L    Chloride 99 (L) 100 - 111 mmol/L    CO2 33 (H) 21 - 32 mmol/L    Anion gap 6 3.0 - 18 mmol/L    Glucose 331 (H) 74 - 99 mg/dL    BUN 29 (H) 7.0 - 18 MG/DL    Creatinine 1.00 0.6 - 1.3 MG/DL    BUN/Creatinine ratio 29 (H) 12 - 20      GFR est AA >60 >60 ml/min/1.73m2    GFR est non-AA 57 (L) >60 ml/min/1.73m2    Calcium 9.2 8.5 - 10.1 MG/DL    Bilirubin, total 0.5 0.2 - 1.0 MG/DL    ALT (SGPT) 41 13 - 56 U/L    AST (SGOT) 11 10 - 38 U/L    Alk. phosphatase 77 45 - 117 U/L    Protein, total 6.6 6.4 - 8.2 g/dL    Albumin 2.6 (L) 3.4 - 5.0 g/dL    Globulin 4.0 2.0 - 4.0 g/dL    A-G Ratio 0.7 (L) 0.8 - 1.7     CBC WITH AUTOMATED DIFF    Collection Time: 07/14/20  3:22 AM   Result Value Ref Range    WBC 6.7 4.6 - 13.2 K/uL    RBC 3.75 (L) 4.20 - 5.30 M/uL    HGB 11.3 (L) 12.0 - 16.0 g/dL    HCT 34.1 (L) 35.0 - 45.0 %    MCV 90.9 74.0 - 97.0 FL    MCH 30.1 24.0 - 34.0 PG    MCHC 33.1 31.0 - 37.0 g/dL    RDW 13.3 11.6 - 14.5 %    PLATELET 684 168 - 493 K/uL    MPV 8.9 (L) 9.2 - 11.8 FL    NEUTROPHILS 85 (H) 40 - 73 %    LYMPHOCYTES 12 (L) 21 - 52 %    MONOCYTES 3 3 - 10 %    EOSINOPHILS 0 0 - 5 %    BASOPHILS 0 0 - 2 %    ABS. NEUTROPHILS 5.7 1.8 - 8.0 K/UL    ABS. LYMPHOCYTES 0.8 (L) 0.9 - 3.6 K/UL    ABS. MONOCYTES 0.2 0.05 - 1.2 K/UL    ABS. EOSINOPHILS 0.0 0.0 - 0.4 K/UL    ABS. BASOPHILS 0.0 0.0 - 0.1 K/UL    DF AUTOMATED     GLUCOSE, POC    Collection Time: 07/14/20  7:46 AM   Result Value Ref Range    Glucose (POC) 326 (H) 70 - 110 mg/dL       RECENT RESULTS  MODALITY IMPRESSION   XR Results from [place] encounter on 07/09/20   XR CHEST PORT    Narrative Portable CXR:    HISTORY: Respiratory distress. Covid 19    Comparison July 11, 2020 1706 hours    Stable cardiomegaly. No vascular congestion. Mild density in the lung bases,  grossly unchanged. Small pleural effusion. Impression IMPRESSION: No acute change with bilateral basilar infiltrate/pneumonia. No CHF. CT Results from [place] encounter on 04/08/20   CT ABD PELV WO CONT    Narrative CT ABDOMEN AND PELVIS WITHOUT ENHANCEMENT    INDICATION: Nausea, right lower quadrant pain, vomiting since this morning. TECHNIQUE: Axial images obtained of the abdomen and pelvis without intravenous  contrast. Coronal and sagittal reformatted images of the abdomen and pelvis were  obtained. All CT scans at this facility are performed using dose optimization technique as  appropriate to a performed exam, to include automated exposure control,  adjustment of the mA and/or kV according to patient size (including appropriate  matching first site-specific examinations), or use of iterative reconstruction  technique. COMPARISON: 7/14/2019. Lack of intravenous contrast renders this study suboptimal for evaluating the  solid abdominal organs, vasculature and bowel. ABDOMEN FINDINGS:     Liver: Liver is 22.5 cm craniocaudal with mild decreased density. Spleen: Unremarkable. Pancreas: Unremarkable. Biliary: Cholecystectomy. Bowel: Mild diverticulosis. Similar small bowel in a periumbilical hernia. Peritoneum/ Retroperitoneum: Unremarkable. Lymph Nodes: Unremarkable. Adrenal Glands: Unremarkable. Kidneys: Unremarkable. Vessels: Moderate atherosclerosis. Multifocal stenosis of the visceral ostia. PELVIS FINDINGS:     Bladder/ Pelvic Organs: Unremarkable. Lung Base: Severe coronary arteriosclerosis left coronary artery and LAD. Centrilobular emphysema. Subsegmental atelectasis at the lung bases. Bones/Soft tissues: Degenerative changes bilateral hips. Degenerative disc  disease. Impression IMPRESSION:    Unchanged small bowel containing periumbilical hernia without associated  obstruction. Hepatomegaly and mild steatosis. Mild diverticulosis. Moderate atherosclerosis with multifocal visceral ostia stenosis. Severe left coronary arteriosclerosis. Emphysema.       MRI No results found for this or any previous visit. ULTRASOUND Results from [place] encounter on 04/10/17   US ABD COMP    Impression IMPRESSION:       1. Echogenic mildly enlarged liver, suggestive of hepatic steatosis. No focal  hepatic lesion identified. 2. Status post cholecystectomy. No biliary ductal dilatation. Cardiology Procedures/Testing:  MODALITY RESULTS   EKG Results for orders placed or performed during the hospital encounter of 07/09/20   EKG, 12 LEAD, INITIAL   Result Value Ref Range    Ventricular Rate 86 BPM    Atrial Rate 86 BPM    P-R Interval 136 ms    QRS Duration 86 ms    Q-T Interval 354 ms    QTC Calculation (Bezet) 423 ms    Calculated P Axis 35 degrees    Calculated R Axis 32 degrees    Calculated T Axis 38 degrees    Diagnosis       Normal sinus rhythm  Normal ECG  When compared with ECG of 07-JUL-2020 16:09,  No significant change was found  Confirmed by Deanna Richardson MD, [facility] [id number]) on 7/10/2020 1:06:31 PM         ECHO 05/21/20   ECHO ADULT COMPLETE 05/25/2020 5/25/2020    Narrative · Image quality for this study was technically difficult. Contrast used:   DEFINITY. · Normal cavity size and systolic function (ejection fraction normal). Moderate concentric hypertrophy. Estimated left ventricular ejection   fraction is 65 - 70%. Visually measured ejection fraction. No regional   wall motion abnormality noted. Moderate (grade 2) left ventricular   diastolic dysfunction. · Mildly dilated left atrium. · Pulmonary hypertension. Pulmonary arterial systolic pressure is 61 mmHg. · Severely elevated central venous pressure (15+ mmHg); IVC diameter is   larger than 21 mm and collapses less than 50% with respiration.         Signed by: Criselda Lynch MD        Special Testing/Procedures:  MODALITY RESULTS   MICRO All Micro Results     Procedure Component Value Units Date/Time    CULTURE, BLOOD [[id number]] Collected:  07/09/20 1543    Order Status:  Completed Specimen:  Blood Updated:  07/14/20 0652     Special Requests: NO SPECIAL REQUESTS        Culture result: NO GROWTH 5 DAYS       CULTURE, BLOOD [[id number]] Collected:  07/09/20 1553    Order Status:  Completed Specimen:  Blood Updated:  07/14/20 0652     Special Requests: NO SPECIAL REQUESTS        Culture result: NO GROWTH 5 DAYS       CULTURE, URINE [[id number]] Collected:  07/09/20 1916    Order Status:  Completed Specimen:  Urine from Clean catch Updated:  07/11/20 0925     Special Requests: NO SPECIAL REQUESTS        Culture result:       No significant growth, <10,000 CFU/mL               UA No results found for this or any previous visit. PATH none     Telemetry Yes   Oxygen 3 l/m NC     Assessment and Plan:     Acute on chronic hypoxic respiratory failure. possibly 2/2 Covid PNA, COPD exacerbation: Pt w/ positive covid swab (7/7). Pt with severe underlying lung disease, Severe COPD on max therapy w/ strong asthma component. Hospitalized 5x in 2020 for COPD exacerbations. Followed by Dr. Kristy Sibley in [place].   -NC and nightly BIPAP. Target sats 88-92  -Continue Brovana, Pulmicort, Solu-medrol  -FU pulmonary recs   -Treatment dose Lovenox   -Daily Covid labs: cbc, bmp, ferritin, LD, D-dimer, CRP, coags      Insulin dependent Type 2 Diabete: Home lantus 40u pm and novolog SSI. BS raised this >391.   -Increase Lantus 45 U.  -SSI   -Can consider more aggressive treatment if continue to see rising glucose readings, but with lowering Steroid dosing, expecting to see drop in glucose  -Accuchecks ACHS  -Diabetic diet     Hypertension, HFpEF: ECHO (5/24/20) SM 94 - 70%. No regional wall motion abnormality. Moderate (grade 2) left ventricular diastolic dysfunction. Mildly dilated left atrium. Pulmonary hypertension. Pulmonary arterial systolic pressure is 61 mmHg. Severely elevated central venous pressure (15+ mmHg); IVC diameter is larger than 21 mm and collapses less than 50% with respiration. SBP in ED elevated  to 140s-170s.  Pt on lisinopril 20mg BID and HCTZ 12.5mg daily at home. Pt also on Lasix 20mg daily PRN for lower extremity edema at home. Patient endorsing increased swelling in  Abdomen and legs.       - Continue Lasix 20mg daily  - Continue Lisinopril 20mg BID  - Continue HCTZ 12.5 mg daily     GERD: Pt takes omeprazole 40mg daily and pepcid for breakthrough symptoms at home.   -Continue protonix 40mg daily     Morbid obesity  -Diabetic diet       Diet Diabetic   DVT Prophylaxis Lovenox   GI Prophylaxis Protonix   Code status Full   Disposition 1-2 days when stable     Point of Contact Guadalupe Prateek  Relationship: Daughter  [phone]     Autumn Brooke MD, PGY-1   [address]   Intern Pager: [phone]   July 14, 2020, 10:42 AM

## 2020-07-14 NOTE — PROGRESS NOTES
0715: Verbal shift change report given to Ahsan Chen RN (On coming nurse) by Narinder Gonzalez RN (outgoing nurse). Report included the following information SBAR, Kardex, Intake/Output, MAR, Accordion and Cardiac Rhythm nsr.

## 2020-07-14 NOTE — PROGRESS NOTES
[facility]  Pulmonary, Critical Care, and Sleep Medicine    Pulmonary Medicine Progress Note    Name: Michele Hollins MRN: [id number]  : 1960 Hospital: [facility]  Date: 2020       Subjective:  Pt is somewhat improved. Still very short of breath at night. Wearing bipap at night. Currently on NC, VSS. Patient Active Problem List   Diagnosis Code    Essential hypertension, benign I10    Diabetes mellitus, type 2 ([facility] [address] E11.9    Esophageal reflux K21.9    SHERRIE on CPAP G47.33, Z99.89    COPD (chronic obstructive pulmonary disease) ([facility]) J44.9    Allergic rhinitis J30.9    COPD with acute exacerbation ([facility]) J44.1    Obesity, Class III, BMI 40-49.9 (morbid obesity) ([facility]) E66.01    Chest pain R07.9    Dyspnea R06.00    COPD exacerbation ([facility]) J44.1    Acute exacerbation of COPD with asthma ([facility]) J44.1, B44.276    Diastolic CHF, chronic ([facility]) I50.32    Diverticulosis K57.90    COPD with acute bronchitis ([facility]) J44.0, J20.9    Hyperlipidemia E78.5    Type 2 diabetes with nephropathy ([facility]) E11.21    Bilateral carotid bruits R09.89    Palpitations R00.2    Acute exacerbation of chronic obstructive pulmonary disease (COPD) ([facility]) J44.1    Atelectasis of right lung J98.11    Hypoxia R09.02    COVID-19 U07.1    Hypokalemia E87.6       Assessment:  · Acute on Chronic Hypoxic Respiratory Failure  · - 2/2 Covid19 Pneumonia  · Covid19 Pneumonia  · Severe Asthma/COPD  · - pt of [facility] [place]  · - currently on LAMA/LABA/ICS, chronic pred 5mg, azithromycin, awaiting approval for benralizumab  · SHERRIE on trilogy machine.     Impression/Plan:   · Continue NC during the day, bipap at night  · nebulizers- brovana and pulmicort or mdi  · Reduce IV corticosteroids  · LE dopplers negative, on ppx lovenox  · Getting remdesivir  · NO ABX needed  · Inflammatory markers much improved  · Will Follow      FiO2 to keep SpO2 >=92%, HOB >=30 degree, aspiration precautions, aggressive pulmonary toileting, incentive spirometry. Other issues management by primary team and respective consultants. Events and notes from last 24 hours reviewed. Discussed with patient and family, answered all questions to their satisfaction. Care plan discussed with nursing.      Labs and images personally seen and available reports reviewed  All current medicines are reviewed       Medications- Current:  Current Facility-Administered Medications   Medication Dose Route Frequency    insulin glargine (LANTUS) injection 40 Units  40 Units SubCUTAneous QHS    enoxaparin (LOVENOX) injection 40 mg  40 mg SubCUTAneous Q12H    benzonatate (TESSALON) capsule 100 mg  100 mg Oral Q8H PRN    dextrose 10% infusion 125-250 mL  125-250 mL IntraVENous PRN    albuterol (PROVENTIL HFA, VENTOLIN HFA, PROAIR HFA) inhaler 2 Puff  2 Puff Inhalation Q4H PRN    arformoteroL (BROVANA) neb solution 15 mcg  15 mcg Nebulization BID RT    budesonide (PULMICORT) 500 mcg/2 ml nebulizer suspension  500 mcg Nebulization BID RT    methylPREDNISolone (PF) (SOLU-MEDROL) injection 40 mg  40 mg IntraVENous Q6H    0.9% sodium chloride infusion 250 mL  250 mL IntraVENous PRN    remdesivir 100 mg in 0.9% sodium chloride 250 mL IVPB  100 mg IntraVENous Q24H    azithromycin (ZITHROMAX) 500 mg in  mL  500 mg IntraVENous Q24H    sodium chloride (NS) flush 5-10 mL  5-10 mL IntraVENous PRN    insulin lispro (HUMALOG) injection   SubCUTAneous AC&HS    glucose chewable tablet 16 g  4 Tab Oral PRN    glucagon (GLUCAGEN) injection 1 mg  1 mg IntraMUSCular PRN    albuterol-ipratropium (DUO-NEB) 2.5 MG-0.5 MG/3 ML  3 mL Nebulization Q4H PRN    albuterol (ACCUNEB) nebulizer solution 1.25 mg  1.25 mg Nebulization Q4H PRN    acetaminophen (TYLENOL) tablet 500 mg  500 mg Oral Q6H PRN    Or    acetaminophen (TYLENOL) suppository 500 mg  500 mg Rectal Q6H PRN    ascorbic acid (vitamin C) (VITAMIN C) tablet 500 mg  500 mg Oral BID  cholecalciferol (VITAMIN D3) (1000 Units /25 mcg) tablet 2 Tab  2,000 Units Oral DAILY    zinc sulfate (ZINCATE) 220 (50) mg capsule 1 Cap  1 Cap Oral DAILY    furosemide (LASIX) tablet 20 mg  20 mg Oral DAILY    lisinopriL (PRINIVIL, ZESTRIL) tablet 20 mg  20 mg Oral DAILY    hydroCHLOROthiazide (HYDRODIURIL) tablet 12.5 mg  12.5 mg Oral DAILY    pantoprazole (PROTONIX) tablet 40 mg  40 mg Oral BID    loratadine (CLARITIN) tablet 10 mg  10 mg Oral DAILY    fluticasone propionate (FLONASE) 50 mcg/actuation nasal spray 2 Spray  2 Spray Both Nostrils DAILY    montelukast (SINGULAIR) tablet 10 mg  10 mg Oral QHS       Objective:  Vital Signs:    Visit Vitals  /66 (BP 1 Location: Left arm)   Pulse 65   Temp 98.2 °F (36.8 °C)   Resp 20   Ht 5' 3\" (1.6 m)   Wt 121.5 kg (267 lb 13.7 oz)   SpO2 96%   BMI 47.45 kg/m²      O2 Device: Nasal cannula  O2 Flow Rate (L/min): 2 l/min  Temp (24hrs), Av.7 °F (36.5 °C), Min:97.3 °F (36.3 °C), Max:98.2 °F (36.8 °C)      Intake/Output:   Last shift:      No intake/output data recorded. Last 3 shifts:  1901 -  0700  In: 1300 [P.O.:800; I.V.:500]  Out: 4900 [Urine:4900]    Intake/Output Summary (Last 24 hours) at 2020 1000  Last data filed at 2020 0335  Gross per 24 hour   Intake 1060 ml   Output 3300 ml   Net -2240 ml       Physical Exam:     General/Neurology: Alert, Awake, on NC  Head:   Normocephalic, without obvious abnormality  Eye:   PERRL, EOM intact  Oral:  Mucus membranes moist  Lung:   B/l air entry fair. No rales. No rhonchi. No wheezing, Decreased BS  Heart:   S1 S2 present  Abdomen:  Soft, non tender, BS+nt   Extremities:  No pedal edema.    Skin:   Dry, intact  Data:      Recent Results (from the past 24 hour(s))   GLUCOSE, POC    Collection Time: 20 12:37 PM   Result Value Ref Range    Glucose (POC) 377 (H) 70 - 110 mg/dL   GLUCOSE, POC    Collection Time: 20  4:59 PM   Result Value Ref Range    Glucose (POC) 318 (H) 70 - 110 mg/dL   GLUCOSE, POC    Collection Time: 07/13/20  8:30 PM   Result Value Ref Range    Glucose (POC) 404 (HH) 70 - 110 mg/dL   GLUCOSE, POC    Collection Time: 07/13/20  8:37 PM   Result Value Ref Range    Glucose (POC) 415 (HH) 70 - 110 mg/dL   PROTHROMBIN TIME + INR    Collection Time: 07/14/20  3:22 AM   Result Value Ref Range    Prothrombin time 14.8 11.5 - 15.2 sec    INR 1.2 0.8 - 1.2     PTT    Collection Time: 07/14/20  3:22 AM   Result Value Ref Range    aPTT 26.4 23.0 - 36.4 SEC   FIBRINOGEN    Collection Time: 07/14/20  3:22 AM   Result Value Ref Range    Fibrinogen 610 (H) 210 - 451 mg/dL   D DIMER    Collection Time: 07/14/20  3:22 AM   Result Value Ref Range    D DIMER 0.28 <0.46 ug/ml(FEU)   C REACTIVE PROTEIN, QT    Collection Time: 07/14/20  3:22 AM   Result Value Ref Range    C-Reactive protein 1.9 (H) 0 - 0.3 mg/dL   FERRITIN    Collection Time: 07/14/20  3:22 AM   Result Value Ref Range    Ferritin 160 8 - 388 NG/ML   PROCALCITONIN    Collection Time: 07/14/20  3:22 AM   Result Value Ref Range    Procalcitonin <3.28 ng/mL   METABOLIC PANEL, COMPREHENSIVE    Collection Time: 07/14/20  3:22 AM   Result Value Ref Range    Sodium 138 136 - 145 mmol/L    Potassium 3.8 3.5 - 5.5 mmol/L    Chloride 99 (L) 100 - 111 mmol/L    CO2 33 (H) 21 - 32 mmol/L    Anion gap 6 3.0 - 18 mmol/L    Glucose 331 (H) 74 - 99 mg/dL    BUN 29 (H) 7.0 - 18 MG/DL    Creatinine 1.00 0.6 - 1.3 MG/DL    BUN/Creatinine ratio 29 (H) 12 - 20      GFR est AA >60 >60 ml/min/1.73m2    GFR est non-AA 57 (L) >60 ml/min/1.73m2    Calcium 9.2 8.5 - 10.1 MG/DL    Bilirubin, total 0.5 0.2 - 1.0 MG/DL    ALT (SGPT) 41 13 - 56 U/L    AST (SGOT) 11 10 - 38 U/L    Alk.  phosphatase 77 45 - 117 U/L    Protein, total 6.6 6.4 - 8.2 g/dL    Albumin 2.6 (L) 3.4 - 5.0 g/dL    Globulin 4.0 2.0 - 4.0 g/dL    A-G Ratio 0.7 (L) 0.8 - 1.7     CBC WITH AUTOMATED DIFF    Collection Time: 07/14/20  3:22 AM   Result Value Ref Range    WBC 6.7 4.6 - 13.2 K/uL    RBC 3.75 (L) 4.20 - 5.30 M/uL    HGB 11.3 (L) 12.0 - 16.0 g/dL    HCT 34.1 (L) 35.0 - 45.0 %    MCV 90.9 74.0 - 97.0 FL    MCH 30.1 24.0 - 34.0 PG    MCHC 33.1 31.0 - 37.0 g/dL    RDW 13.3 11.6 - 14.5 %    PLATELET 200 567 - 997 K/uL    MPV 8.9 (L) 9.2 - 11.8 FL    NEUTROPHILS 85 (H) 40 - 73 %    LYMPHOCYTES 12 (L) 21 - 52 %    MONOCYTES 3 3 - 10 %    EOSINOPHILS 0 0 - 5 %    BASOPHILS 0 0 - 2 %    ABS. NEUTROPHILS 5.7 1.8 - 8.0 K/UL    ABS. LYMPHOCYTES 0.8 (L) 0.9 - 3.6 K/UL    ABS. MONOCYTES 0.2 0.05 - 1.2 K/UL    ABS. EOSINOPHILS 0.0 0.0 - 0.4 K/UL    ABS. BASOPHILS 0.0 0.0 - 0.1 K/UL    DF AUTOMATED     GLUCOSE, POC    Collection Time: 07/14/20  7:46 AM   Result Value Ref Range    Glucose (POC) 326 (H) 70 - 110 mg/dL         Chemistry   Recent Labs     07/14/20 0322 07/13/20 0456 07/12/20  0248   * 254* 263*    139 139   K 3.8 3.7 3.7   CL 99* 103 104   CO2 33* 30 28   BUN 29* 23* 21*   CREA 1.00 0.84 0.90   CA 9.2 8.9 8.9   AGAP 6 6 7   BUCR 29* 27* 23*   AP 77 74 88   TP 6.6 6.5 6.9   ALB 2.6* 2.6* 2.7*   GLOB 4.0 3.9 4.2*   AGRAT 0.7* 0.7* 0.6*       CBC w/Diff   Recent Labs     07/14/20 0322 07/13/20 0456 07/12/20  0248   WBC 6.7 6.7 5.3   RBC 3.75* 3.63* 3.63*   HGB 11.3* 11.1* 11.0*   HCT 34.1* 33.0* 33.1*    331 274   GRANS 85* 87* 85*   LYMPH 12* 7* 8*   EOS 0 0 0       ABG No results for input(s): PHI, PHI, POC2, PCO2I, PO2, PO2I, HCO3, HCO3I, FIO2, FIO2I in the last 72 hours. Micro  No results for input(s): SDES, CULT in the last 72 hours. No results for input(s): CULT in the last 72 hours. CT (Most Recent)   Results from Hospital Encounter encounter on 04/08/20   CT ABD PELV WO CONT    Narrative CT ABDOMEN AND PELVIS WITHOUT ENHANCEMENT    INDICATION: Nausea, right lower quadrant pain, vomiting since this morning.     TECHNIQUE: Axial images obtained of the abdomen and pelvis without intravenous  contrast. Coronal and sagittal reformatted images of the abdomen and pelvis were  obtained. All CT scans at this facility are performed using dose optimization technique as  appropriate to a performed exam, to include automated exposure control,  adjustment of the mA and/or kV according to patient size (including appropriate  matching first site-specific examinations), or use of iterative reconstruction  technique. COMPARISON: 7/14/2019. Lack of intravenous contrast renders this study suboptimal for evaluating the  solid abdominal organs, vasculature and bowel. ABDOMEN FINDINGS:     Liver: Liver is 22.5 cm craniocaudal with mild decreased density. Spleen: Unremarkable. Pancreas: Unremarkable. Biliary: Cholecystectomy. Bowel: Mild diverticulosis. Similar small bowel in a periumbilical hernia. Peritoneum/ Retroperitoneum: Unremarkable. Lymph Nodes: Unremarkable. Adrenal Glands: Unremarkable. Kidneys: Unremarkable. Vessels: Moderate atherosclerosis. Multifocal stenosis of the visceral ostia. PELVIS FINDINGS:     Bladder/ Pelvic Organs: Unremarkable. Lung Base: Severe coronary arteriosclerosis left coronary artery and LAD. Centrilobular emphysema. Subsegmental atelectasis at the lung bases. Bones/Soft tissues: Degenerative changes bilateral hips. Degenerative disc  disease. Impression IMPRESSION:    Unchanged small bowel containing periumbilical hernia without associated  obstruction. Hepatomegaly and mild steatosis. Mild diverticulosis. Moderate atherosclerosis with multifocal visceral ostia stenosis. Severe left coronary arteriosclerosis. Emphysema. XR (Most Recent). CXR reviewed by me and compared with previous CXR   Results from Hospital Encounter encounter on 07/09/20   XR CHEST PORT    Narrative Portable CXR:    HISTORY: Respiratory distress. Covid 19    Comparison July 11, 2020 1706 hours    Stable cardiomegaly. No vascular congestion. Mild density in the lung bases,  grossly unchanged. Small pleural effusion. Impression IMPRESSION: No acute change with bilateral basilar infiltrate/pneumonia. No CHF. See my orders for details     Total care time exclusive of procedures with complex decision making, coordination of care and counseling patient performed and > 50% time spent in face to face evaluation as mentioned above.     Aleja Pérez MD  Critical Care Medicine

## 2020-07-15 LAB
ALBUMIN SERPL-MCNC: 2.9 G/DL (ref 3.4–5)
ALBUMIN/GLOB SERPL: 0.7 {RATIO} (ref 0.8–1.7)
ALP SERPL-CCNC: 75 U/L (ref 45–117)
ALT SERPL-CCNC: 39 U/L (ref 13–56)
ANION GAP SERPL CALC-SCNC: 5 MMOL/L (ref 3–18)
AST SERPL-CCNC: 14 U/L (ref 10–38)
BACTERIA SPEC CULT: NORMAL
BACTERIA SPEC CULT: NORMAL
BASOPHILS # BLD: 0 K/UL (ref 0–0.06)
BASOPHILS NFR BLD: 0 % (ref 0–3)
BILIRUB SERPL-MCNC: 0.5 MG/DL (ref 0.2–1)
BUN SERPL-MCNC: 26 MG/DL (ref 7–18)
BUN/CREAT SERPL: 28 (ref 12–20)
CALCIUM SERPL-MCNC: 9.4 MG/DL (ref 8.5–10.1)
CHLORIDE SERPL-SCNC: 97 MMOL/L (ref 100–111)
CO2 SERPL-SCNC: 35 MMOL/L (ref 21–32)
CREAT SERPL-MCNC: 0.92 MG/DL (ref 0.6–1.3)
CRP SERPL-MCNC: 1.2 MG/DL (ref 0–0.3)
DIFFERENTIAL METHOD BLD: ABNORMAL
EOSINOPHIL # BLD: 0 K/UL (ref 0–0.4)
EOSINOPHIL NFR BLD: 0 % (ref 0–5)
ERYTHROCYTE [DISTWIDTH] IN BLOOD BY AUTOMATED COUNT: 13.2 % (ref 11.6–14.5)
GLOBULIN SER CALC-MCNC: 3.9 G/DL (ref 2–4)
GLUCOSE BLD STRIP.AUTO-MCNC: 237 MG/DL (ref 70–110)
GLUCOSE BLD STRIP.AUTO-MCNC: 267 MG/DL (ref 70–110)
GLUCOSE BLD STRIP.AUTO-MCNC: 283 MG/DL (ref 70–110)
GLUCOSE BLD STRIP.AUTO-MCNC: 294 MG/DL (ref 70–110)
GLUCOSE SERPL-MCNC: 274 MG/DL (ref 74–99)
HCT VFR BLD AUTO: 35.7 % (ref 35–45)
HGB BLD-MCNC: 12.3 G/DL (ref 12–16)
LYMPHOCYTES # BLD: 1.6 K/UL (ref 0.8–3.5)
LYMPHOCYTES NFR BLD: 16 % (ref 20–51)
MCH RBC QN AUTO: 31.1 PG (ref 24–34)
MCHC RBC AUTO-ENTMCNC: 34.5 G/DL (ref 31–37)
MCV RBC AUTO: 90.2 FL (ref 74–97)
METAMYELOCYTES NFR BLD MANUAL: 2 %
MONOCYTES # BLD: 0.2 K/UL (ref 0–1)
MONOCYTES NFR BLD: 2 % (ref 2–9)
NEUTS BAND NFR BLD MANUAL: 5 % (ref 0–5)
NEUTS SEG # BLD: 8 K/UL (ref 1.8–8)
NEUTS SEG NFR BLD: 75 % (ref 42–75)
PLATELET # BLD AUTO: 414 K/UL (ref 135–420)
PLATELET COMMENTS,PCOM: ABNORMAL
PMV BLD AUTO: 9.3 FL (ref 9.2–11.8)
POTASSIUM SERPL-SCNC: 3.8 MMOL/L (ref 3.5–5.5)
PROT SERPL-MCNC: 6.8 G/DL (ref 6.4–8.2)
RBC # BLD AUTO: 3.96 M/UL (ref 4.2–5.3)
RBC MORPH BLD: ABNORMAL
SERVICE CMNT-IMP: NORMAL
SERVICE CMNT-IMP: NORMAL
SODIUM SERPL-SCNC: 137 MMOL/L (ref 136–145)
WBC # BLD AUTO: 10 K/UL (ref 4.6–13.2)

## 2020-07-15 PROCEDURE — 74011250637 HC RX REV CODE- 250/637: Performed by: FAMILY MEDICINE

## 2020-07-15 PROCEDURE — 85025 COMPLETE CBC W/AUTO DIFF WBC: CPT

## 2020-07-15 PROCEDURE — 74011636637 HC RX REV CODE- 636/637: Performed by: STUDENT IN AN ORGANIZED HEALTH CARE EDUCATION/TRAINING PROGRAM

## 2020-07-15 PROCEDURE — 86140 C-REACTIVE PROTEIN: CPT

## 2020-07-15 PROCEDURE — 82962 GLUCOSE BLOOD TEST: CPT

## 2020-07-15 PROCEDURE — 74011250637 HC RX REV CODE- 250/637: Performed by: HOSPITALIST

## 2020-07-15 PROCEDURE — 80053 COMPREHEN METABOLIC PANEL: CPT

## 2020-07-15 PROCEDURE — 74011250637 HC RX REV CODE- 250/637: Performed by: STUDENT IN AN ORGANIZED HEALTH CARE EDUCATION/TRAINING PROGRAM

## 2020-07-15 PROCEDURE — 65660000000 HC RM CCU STEPDOWN

## 2020-07-15 PROCEDURE — 74011636637 HC RX REV CODE- 636/637: Performed by: FAMILY MEDICINE

## 2020-07-15 PROCEDURE — 97165 OT EVAL LOW COMPLEX 30 MIN: CPT

## 2020-07-15 PROCEDURE — 97530 THERAPEUTIC ACTIVITIES: CPT

## 2020-07-15 PROCEDURE — 97162 PT EVAL MOD COMPLEX 30 MIN: CPT

## 2020-07-15 PROCEDURE — 74011250636 HC RX REV CODE- 250/636: Performed by: INTERNAL MEDICINE

## 2020-07-15 PROCEDURE — 36415 COLL VENOUS BLD VENIPUNCTURE: CPT

## 2020-07-15 RX ORDER — POLYETHYLENE GLYCOL 3350 17 G/17G
17 POWDER, FOR SOLUTION ORAL DAILY
Status: DISCONTINUED | OUTPATIENT
Start: 2020-07-16 | End: 2020-07-18 | Stop reason: HOSPADM

## 2020-07-15 RX ADMIN — Medication 500 MG: at 22:36

## 2020-07-15 RX ADMIN — LORATADINE 10 MG: 10 TABLET ORAL at 09:49

## 2020-07-15 RX ADMIN — METHYLPREDNISOLONE SODIUM SUCCINATE 40 MG: 40 INJECTION, POWDER, FOR SOLUTION INTRAMUSCULAR; INTRAVENOUS at 09:48

## 2020-07-15 RX ADMIN — BENZONATATE 100 MG: 100 CAPSULE ORAL at 22:37

## 2020-07-15 RX ADMIN — ENOXAPARIN SODIUM 40 MG: 40 INJECTION SUBCUTANEOUS at 21:00

## 2020-07-15 RX ADMIN — PANTOPRAZOLE 40 MG: 40 TABLET, DELAYED RELEASE ORAL at 09:49

## 2020-07-15 RX ADMIN — ZINC SULFATE 220 MG (50 MG) CAPSULE 1 CAPSULE: CAPSULE at 09:49

## 2020-07-15 RX ADMIN — ACETAMINOPHEN 500 MG: 500 TABLET ORAL at 22:37

## 2020-07-15 RX ADMIN — METHYLPREDNISOLONE SODIUM SUCCINATE 40 MG: 40 INJECTION, POWDER, FOR SOLUTION INTRAMUSCULAR; INTRAVENOUS at 22:38

## 2020-07-15 RX ADMIN — INSULIN LISPRO 9 UNITS: 100 INJECTION, SOLUTION INTRAVENOUS; SUBCUTANEOUS at 22:41

## 2020-07-15 RX ADMIN — Medication 500 MG: at 09:49

## 2020-07-15 RX ADMIN — INSULIN LISPRO 9 UNITS: 100 INJECTION, SOLUTION INTRAVENOUS; SUBCUTANEOUS at 18:09

## 2020-07-15 RX ADMIN — INSULIN LISPRO 9 UNITS: 100 INJECTION, SOLUTION INTRAVENOUS; SUBCUTANEOUS at 12:59

## 2020-07-15 RX ADMIN — MONTELUKAST 10 MG: 10 TABLET, FILM COATED ORAL at 22:37

## 2020-07-15 RX ADMIN — FLUTICASONE PROPIONATE 2 SPRAY: 50 SPRAY, METERED NASAL at 09:00

## 2020-07-15 RX ADMIN — INSULIN GLARGINE 45 UNITS: 100 INJECTION, SOLUTION SUBCUTANEOUS at 22:38

## 2020-07-15 RX ADMIN — LISINOPRIL 20 MG: 20 TABLET ORAL at 09:48

## 2020-07-15 RX ADMIN — ENOXAPARIN SODIUM 40 MG: 40 INJECTION SUBCUTANEOUS at 09:47

## 2020-07-15 RX ADMIN — INSULIN LISPRO 6 UNITS: 100 INJECTION, SOLUTION INTRAVENOUS; SUBCUTANEOUS at 09:47

## 2020-07-15 RX ADMIN — FUROSEMIDE 20 MG: 20 TABLET ORAL at 09:49

## 2020-07-15 RX ADMIN — HYDROCHLOROTHIAZIDE 12.5 MG: 25 TABLET ORAL at 09:49

## 2020-07-15 RX ADMIN — PANTOPRAZOLE 40 MG: 40 TABLET, DELAYED RELEASE ORAL at 22:37

## 2020-07-15 RX ADMIN — CHOLECALCIFEROL TAB 25 MCG (1000 UNIT) 2 TABLET: 25 TAB at 09:49

## 2020-07-15 RX ADMIN — BUDESONIDE AND FORMOTEROL FUMARATE DIHYDRATE 2 PUFF: 160; 4.5 AEROSOL RESPIRATORY (INHALATION) at 08:00

## 2020-07-15 NOTE — PROGRESS NOTES
Discharge planning    Reviewed chart. CM will monitor PT/OT notes to assist with discharge planning. Patient has home oxygen, trilogy, and personal care aides in home. CM will continue to assist with transitional needs for a safe discharge.     ANA M Kennedy, RN  Pager # [phone]  Care Manager

## 2020-07-15 NOTE — PROGRESS NOTES
Problem: Self Care Deficits Care Plan (Adult)  Goal: *Acute Goals and Plan of Care (Insert Text)  Description: Occupational Therapy Goals  Initiated 7/15/2020 within 7 day(s). 1.  Patient will perform bed mobility in preparation for self-care with modified independence. 2.  Patient will perform functional activity standing for 4-7 minutes with modified independence and F+ balance. 3.  Patient will perform lower body dressing with supervision/set-up. 4.  Patient will perform toilet transfers with supervision/set-up. 5.  Patient will perform all aspects of toileting with supervision/set-up. 6.  Patient will participate in upper extremity therapeutic exercise/activities with independence for 8 minutes to increase ROM/strength for self-care. 7.  Patient will utilize energy conservation techniques during functional activities with verbal cues. Prior Level of Function: Patient was modified independent with self-care and used a RW for functional mobility PTA. Patient known from previous OT visits, reports all DME at home. Patient lives with daughter/granddaughter and had a PCA coming to the house. However recently, patient family members (+) COVID and aides have not been coming to the home. Outcome: Progressing Towards Goal     OCCUPATIONAL THERAPY EVALUATION    Patient: Lissette Guerra (56 y.o. female)  Date: 7/15/2020  Primary Diagnosis: COVID-19 [U07.1]  Hypoxia [R09.02]  COVID-19 [U07.1]  Hypokalemia [E87.6]  Precautions: Fall, Contact    ASSESSMENT :  Patient cleared to participate in OT evaluation by RN. Upon entering the room, the patient was seated edge of bed, alert, and agreeable to participate in OT evaluation with CNA briefly present. Patient on 2L nasal cannula and remained between 92%-95% O2 throughout session. Patient contact guard assist for lower body dressing and contact guard assist for functional transfers.  Based on the objective data described below, the patient presents with decreased strength, decreased endurance, decreased independence, decreased functional balance, and decreased functional mobility, which impedes pt performance in basic self-care/ADL tasks. Patient would benefit from skilled OT to restore PLOF and maximize function. Patient will benefit from skilled intervention to address the above impairments. Patient's rehabilitation potential is considered to be Fair  Factors which may influence rehabilitation potential include:  []             None noted  [x]             Mental ability/status  [x]             Medical condition  [x]             Home/family situation and support systems  [x]             Safety awareness  [x]             Pain tolerance/management  []             Other:      PLAN :  Recommendations and Planned Interventions:   [x]               Self Care Training                  [x]      Therapeutic Activities  [x]               Functional Mobility Training   []      Cognitive Retraining  [x]               Therapeutic Exercises           [x]      Endurance Activities  [x]               Balance Training                    [x]      Neuromuscular Re-Education  []               Visual/Perceptual Training     [x]      Home Safety Training  [x]               Patient Education                   [x]      Family Training/Education  []               Other (comment):    Frequency/Duration: Patient will be followed by occupational therapy 1-2 times per day/4-7 days per week to address goals. Discharge Recommendations: Home Health with Family Supervision  Further Equipment Recommendations for Discharge: Patient has all DME     SUBJECTIVE:   Patient stated your'e asking for a lot.  This is all a 360 for me    OBJECTIVE DATA SUMMARY:     Past Medical History:   Diagnosis Date    Asthma     Chronic lung disease     COPD     Cystocele, midline     Diabetes mellitus ([facility] [address]     GERD (gastroesophageal reflux disease)     Hidradenitis suppurativa     Hyperlipidemia Hypertension     SHERRIE on CPAP     CPAP    Stress incontinence      Past Surgical History:   Procedure Laterality Date    BREAST SURGERY PROCEDURE UNLISTED      Right breast benign tumor removal    HX APPENDECTOMY      HX CHOLECYSTECTOMY      HX DILATION AND CURETTAGE      Dysfunctional uterine bleeding, thought 2/2 fibroids    HX TUBAL LIGATION       Barriers to Learning/Limitations: None  Compensate with: visual, verbal, tactile, kinesthetic cues/model    Home Situation:   Home Situation  Home Environment: Private residence  One/Two Story Residence: Two story, live on 1st floor  Living Alone: No  Support Systems: Family member(s)  Patient Expects to be Discharged to[de-identified] Private residence  Current DME Used/Available at Home: Oxygen, portable, Shower chair, Walker, rolling, Adaptive bathing aides, Adaptive dressing aides, Commode, bedside  [x]  Right hand dominant   []  Left hand dominant    Cognitive/Behavioral Status:  Neurologic State: Alert  Orientation Level: Oriented X4  Cognition: Follows commands  Safety/Judgement: Fall prevention    Skin: Intact  Edema: None noted    Vision/Perceptual:    Acuity: Within Defined Limits      Coordination: BUE  Fine Motor Skills-Upper: Left Intact; Right Intact    Gross Motor Skills-Upper: Left Intact; Right Intact    Balance:  Sitting: Impaired; Without support  Sitting - Static: Good (unsupported)  Sitting - Dynamic: Fair (occasional)  Standing: Impaired  Standing - Static: Fair  Standing - Dynamic : Fair    Strength: BUE  Strength: Generally decreased, functional    Tone & Sensation: BUE  Tone: Normal  Sensation: Intact    Range of Motion: BUE  AROM: Generally decreased, functional    Functional Mobility and Transfers for ADLs:  Bed Mobility:  Supine to Sit: (patient sitting EOB upon this OT's arrival)  Scooting: Contact guard assistance    Transfers:  Sit to Stand: Contact guard assistance  Stand to Sit: Contact guard assistance   Toilet Transfer : Contact guard assistance(simulation using BSC)    ADL Assessment:   Feeding: Setup    Oral Facial Hygiene/Grooming: Setup    Bathing: Contact guard assistance    Upper Body Dressing: Stand-by assistance    Lower Body Dressing: Contact guard assistance    Toileting: Contact guard assistance    ADL Intervention:  Lower Body Dressing Assistance  Dressing Assistance: Contact guard assistance  Socks: Contact guard assistance(prop tailor sit)  Position Performed: Seated edge of bed    Cognitive Retraining  Safety/Judgement: Fall prevention    Pain:  Pain level pre-treatment: 0/10   Pain level post-treatment: 0/10   Pain Intervention(s): Medication (see MAR); Response to intervention: Nurse notified, See doc flow    Activity Tolerance:   Fair    Please refer to the flowsheet for vital signs taken during this treatment. After treatment:   [] Patient left in no apparent distress sitting up in chair  [x] Patient left in no apparent distress sitting EOB eating lunch tray  [x] Call bell left within reach  [x] Nursing notified  [] Caregiver present  [] Bed alarm activated    COMMUNICATION/EDUCATION:   [x] Role of Occupational Therapy in the acute care setting  [x] Home safety education was provided and the patient/caregiver indicated understanding. [x] Patient/family have participated as able in goal setting and plan of care. [x] Patient/family agree to work toward stated goals and plan of care. [] Patient understands intent and goals of therapy, but is neutral about his/her participation. [] Patient is unable to participate in goal setting and plan of care. Thank you for this referral.  Judith Nguyen OTR/L  Time Calculation: 10 mins    Eval Complexity: History: MEDIUM Complexity : Expanded review of history including physical, cognitive and psychosocial  history ;    Examination: MEDIUM Complexity : 3-5 performance deficits relating to physical, cognitive , or psychosocial skils that result in activity limitations and / or participation restrictions;    Decision Making:LOW Complexity : No comorbidities that affect functional and no verbal or physical assistance needed to complete eval tasks

## 2020-07-15 NOTE — PROGRESS NOTES
Problem: Mobility Impaired (Adult and Pediatric)  Goal: *Acute Goals and Plan of Care (Insert Text)  Description: Physical Therapy Goals  Initiated 7/15/2020 and to be accomplished within 7 day(s)  1. Patient will transfer from bed to chair and chair to bed with modified independence using the least restrictive device. 2.  Patient will perform sit to stand with modified independence. 3.  Patient will ambulate with modified independence for 20 feet with the least restrictive device. 4.  To assess for stairs as needed    Prior Level of Function:   Patient was modified independence for all mobility including gait using walker sometimes, other times without AD. Patient lives with daughter and two grandchildren in a two-story home but bedroom and [facility] are downstairs. No full bathroom is downstairs. Has a personal care aide that comes and helps with meals. Daughter and grandchildren work. Outcome: Progressing Towards Goal   PHYSICAL THERAPY EVALUATION    Patient: Pipo Brody (5 y.o. female)  Date: 7/15/2020  Primary Diagnosis: COVID-19 [U07.1]  Hypoxia [R09.02]  COVID-19 [U07.1]  Hypokalemia [E87.6]        Precautions:   Fall, Contact, Other (comment)(droplet plus COVID)  PLOF: modified independent    ASSESSMENT :  Based on the objective data described below, the patient presents with impaired activity tolerance, increased oxygen requirements, generalized deconditioning, impaired bed mobility, impaired transfers, and unsteadiness on feet. Per patient, she has been in and out of hospital 6 times since January 2020. Nurse cleared patient for participation in skilled physical therapy. Patient received reclined in bed, awake and alert, agreeable to physical therapy treatment. Patient with 4 LPM O2 per NC. Vitals at rest: HR 59 bpm, SpO2 97%, /73 mmHg. Patient demonstrated supine to sit with modified independence but needed seated rest break immediately due to SOB.  SpO2 96% on 4 lPM O2 per NC. Completed sit<>stand transfers with standby assist without AD. Patient with fair+ standing static balance but unable to assess gait as patient only able to tolerate 15 seconds standing before too short of breath. SpO2 dropped to 91% on 4 LPM O2 per NC. Extended seated rest break required after standing. Educated on pursed lip breathing but patient stated that she has had \"COPD for 15 years and knows all about that. \" Patient left sitting up in bed with dinner tray in front of patient, all needs in reach, call bell in reach, VSS after session: HR 60 bpm, SpO2 96%. Nurse notified of patient disposition after session. Patient will benefit from skilled intervention to address the above impairments. Patient's rehabilitation potential is considered to be Good  Factors which may influence rehabilitation potential include:   []         None noted  []         Mental ability/status  [x]         Medical condition  [x]         Home/family situation and support systems  []         Safety awareness  []         Pain tolerance/management  []         Other:      PLAN :  Recommendations and Planned Interventions:   [x]           Bed Mobility Training             [x]    Neuromuscular Re-Education  [x]           Transfer Training                   []    Orthotic/Prosthetic Training  [x]           Gait Training                          []    Modalities  [x]           Therapeutic Exercises           []    Edema Management/Control  [x]           Therapeutic Activities            [x]    Family Training/Education  [x]           Patient Education  []           Other (comment):    Frequency/Duration: Patient will be followed by physical therapy 1-2 times per day/4-7 days per week to address goals.   Discharge Recommendations: Home Health and family supervision and continued aide care, pending progress  Further Equipment Recommendations for Discharge: N/A patient appears equipped with walker, BSC, shower chair, home O2 and bipap SUBJECTIVE:   Patient stated I'm going to keep fighting until I can't anymore. I just take things one day at a time.     OBJECTIVE DATA SUMMARY:     Past Medical History:   Diagnosis Date    Asthma     Chronic lung disease     COPD     Cystocele, midline     Diabetes mellitus (HCC)     GERD (gastroesophageal reflux disease)     Hidradenitis suppurativa     Hyperlipidemia     Hypertension     SHERRIE on CPAP     CPAP    Stress incontinence      Past Surgical History:   Procedure Laterality Date    BREAST SURGERY PROCEDURE UNLISTED      Right breast benign tumor removal    HX APPENDECTOMY      HX CHOLECYSTECTOMY      HX DILATION AND CURETTAGE      Dysfunctional uterine bleeding, thought 2/2 fibroids    HX TUBAL LIGATION       Barriers to Learning/Limitations: None  Compensate with: N/A  Home Situation:  Home Situation  Home Environment: Private residence  One/Two Story Residence: Two story, live on 1st floor  Living Alone: No  Support Systems: Family member(s)(daughter and two grandchildren; daughter works)  Patient Expects to be Discharged to[employer] residence  Current DME Used/Available at Home: Oxygen, portable, Walker, rolling, Commode, bedside, Other (comment)(bipap prn and at night)  Critical Behavior:  Neurologic State: Alert  Orientation Level: Oriented X4  Cognition: Follows commands  Safety/Judgement: Fall prevention  Psychosocial  Patient Behaviors: Calm; Cooperative                 Strength:    Strength: Generally decreased, functional     Tone & Sensation:   Tone: Normal      Sensation: Intact          Range Of Motion:  AROM: Generally decreased, functional       Functional Mobility:  Bed Mobility:     Supine to Sit: Modified independent     Scooting: Modified independent  Transfers:  Sit to Stand: Stand-by assistance  Stand to Sit: Stand-by assistance    Balance:   Sitting: Intact; Without support  Sitting - Static: Good (unsupported)  Sitting - Dynamic: Good (unsupported)  Standing: Impaired; Without support  Standing - Static: Good  Standing - Dynamic : Fair     Ambulation/Gait Training:  Gait Description (WDL): (limited assessment due to SOB)       Pain:  Pain level pre-treatment: 3/10 \"all over\"  Pain level post-treatment: 3/10   Pain Intervention(s) : Medication (see MAR); Rest, Repositioning  Response to intervention: Nurse notified, See doc flow    Activity Tolerance:   Fair-  Please refer to the flowsheet for vital signs taken during this treatment. After treatment:   []         Patient left in no apparent distress sitting up in chair  [x]         Patient left in no apparent distress in bed  [x]         Call bell left within reach  [x]         Nursing notified  []         Caregiver present  []         Bed alarm activated  []         SCDs applied    COMMUNICATION/EDUCATION:   [x]         Role of Physical Therapy in the acute care setting. [x]         Fall prevention education was provided and the patient/caregiver indicated understanding. [x]         Patient/family have participated as able in goal setting and plan of care. [x]         Patient/family agree to work toward stated goals and plan of care. []         Patient understands intent and goals of therapy, but is neutral about his/her participation. []         Patient is unable to participate in goal setting/plan of care: ongoing with therapy staff.  []         Other:     Thank you for this referral.  Jaiden Dorsey DPT   Time Calculation: 23 mins      Eval Complexity: History: HIGH Complexity :3+ comorbidities / personal factors will impact the outcome/ POC Exam:MEDIUM Complexity : 3 Standardized tests and measures addressing body structure, function, activity limitation and / or participation in recreation  Presentation: MEDIUM Complexity : Evolving with changing characteristics  Clinical Decision Making:Medium Complexity strength, bed mobility, transfers, balance, activity tolerance  Overall Complexity:MEDIUM

## 2020-07-15 NOTE — ROUTINE PROCESS
Received verbal report from  [address] [place] [address], report included SBAR, MAR, and Kardex. Gave verbal to [place], report included SBAR, MAR, and Kardex.

## 2020-07-15 NOTE — PROGRESS NOTES
Infectious Disease progress Note        Reason: Acute hypoxic respiratory failure, confirmed COVID-19 pneumonia    Current abx Prior abx   remdesivir since 7/10/20  Azithromycin since 7/10 Ceftriaxone, vancomycin 2020-7/10       Assessment :      60 y.o. female with PMH of COPD on home O2, IDDM2 (last hemoglobin A1c 8.4 on 2020), HTN, HFpEF, SHERRIE on CPAP, GERD, allergic rhinitis, presented to ED on 2020 with fever, sob and cough w/ known covid+.      Chest x-ray 20: Bibasilar infiltrates     Clinical presentation consistent with acute hypoxic respiratory failure secondary to COVID-19 pneumonia superimposed on underlying COPD    Labs on 7/10-ferritin 166, , CRP 12.9, d-dimer 0.3  Labs on - ferritin 283, crp 14.5, procalcitonin 0.28, d-dimer 0.33  Labs on -ferritin 218, CRP 3.4, procalcitonin 0.09, d-dimer less than 0.27    Underlying severe COPD, uncontrolled type 2 diabetes puts patient at high risk of clinical deterioration. S/p remdesivir since 7/10, convalescent plasma on     Venous duplex 7/10/20 negative for dvt    Clinically better. Improved cough. Comfortable on 3 L oxygen via nasal cannula. Recommendations:  1. Completed remdesivir/azithromycin/convalescent plasma  2. Taper steroids per pulmonary   3. Continue adjunctive therapy for COVID-19  4. Continue Lovenox per primary  --As d/w pt, completed her therapies & is doing well. Will sign off now, please re-call our service for additional questions or concerns. HPI:  Pt reports that she is doing well overall today  She is on less O2 and has less SOB  She denies n/v/diarrhea     Allergies: Ancef [cefazolin]; Contrast agent [iodine];  Fish containing products; Statins-hmg-coa reductase inhibitors; Metformin; and Codeine    Temp (24hrs), Av.2 °F (36.8 °C), Min:97.6 °F (36.4 °C), Max:98.5 °F (36.9 °C)    Visit Vitals  /83 (BP 1 Location: Left arm, BP Patient Position: Sitting)   Pulse 61   Temp 98.4 °F (36.9 °C)   Resp 22   Ht 5' 3\" (1.6 m)   Wt 121.5 kg (267 lb 12.8 oz)   SpO2 93%   BMI 47.44 kg/m²     PE:  GEN: sitting up at edge of bed eating dinner  PULM: sitting up at edge of bed, on 2L via NC, speaking comfortably in full sentences, no cough  EXT: no edema  SKIN: no acute rash on limited skin exam  NEURO: A&O x 4, nonfocal, speech fluent    Labs: Results:   Chemistry Recent Labs     07/15/20  0535 07/14/20 0322 07/13/20 0456   * 331* 254*    138 139   K 3.8 3.8 3.7   CL 97* 99* 103   CO2 35* 33* 30   BUN 26* 29* 23*   CREA 0.92 1.00 0.84   CA 9.4 9.2 8.9   AGAP 5 6 6   BUCR 28* 29* 27*   AP 75 77 74   TP 6.8 6.6 6.5   ALB 2.9* 2.6* 2.6*   GLOB 3.9 4.0 3.9   AGRAT 0.7* 0.7* 0.7*      CBC w/Diff Recent Labs     07/15/20  0535 07/14/20 0322 07/13/20 0456   WBC 10.0 6.7 6.7   RBC 3.96* 3.75* 3.63*   HGB 12.3 11.3* 11.1*   HCT 35.7 34.1* 33.0*    351 331   GRANS 75 85* 87*   LYMPH 16* 12* 7*   EOS 0 0 0      Microbiology No results for input(s): CULT in the last 72 hours.        RADIOLOGY:  Reviewed, none new    Dr. Wojciech Lisa, Infectious Disease Specialist  July 15, 2020

## 2020-07-15 NOTE — PROGRESS NOTES
[facility]  Progress Note    Patient: Diane Fernandez MRN: [id number]   SSN: [id number]  YOB: 1960   Age: 60 y.o. Sex: female      Admit Date: 7/9/2020    LOS: 6 days   Chief Complaint   Patient presents with    Concern For COVID-19 (Coronavirus)    Shortness of Breath       Subjective:     Per nurse, patient was well overnight. continuing to stay on 2 lpm, sats at 92-95%. VSS, and patient seems well overall. Will defer full exam and Physical due to COVID    ROS as in subjective    Objective:     Visit Vitals  /83 (BP 1 Location: Left arm, BP Patient Position: Sitting)   Pulse 61   Temp 98.4 °F (36.9 °C)   Resp 22   Ht 5' 3\" (1.6 m)   Wt 121.5 kg (267 lb 12.8 oz)   SpO2 93%   BMI 47.44 kg/m²       Physical Exam:   Deferred     Intake and Output:  Current Shift: No intake/output data recorded. Last three shifts: 07/13 1901 - 07/15 0700  In: 1230 [P.O.:730; I.V.:500]  Out: 4900 [Urine:4900]    Lab/Data Review:  Recent Results (from the past 12 hour(s))   C REACTIVE PROTEIN, QT    Collection Time: 07/15/20  5:35 AM   Result Value Ref Range    C-Reactive protein 1.2 (H) 0 - 0.3 mg/dL   METABOLIC PANEL, COMPREHENSIVE    Collection Time: 07/15/20  5:35 AM   Result Value Ref Range    Sodium 137 136 - 145 mmol/L    Potassium 3.8 3.5 - 5.5 mmol/L    Chloride 97 (L) 100 - 111 mmol/L    CO2 35 (H) 21 - 32 mmol/L    Anion gap 5 3.0 - 18 mmol/L    Glucose 274 (H) 74 - 99 mg/dL    BUN 26 (H) 7.0 - 18 MG/DL    Creatinine 0.92 0.6 - 1.3 MG/DL    BUN/Creatinine ratio 28 (H) 12 - 20      GFR est AA >60 >60 ml/min/1.73m2    GFR est non-AA >60 >60 ml/min/1.73m2    Calcium 9.4 8.5 - 10.1 MG/DL    Bilirubin, total 0.5 0.2 - 1.0 MG/DL    ALT (SGPT) 39 13 - 56 U/L    AST (SGOT) 14 10 - 38 U/L    Alk.  phosphatase 75 45 - 117 U/L    Protein, total 6.8 6.4 - 8.2 g/dL    Albumin 2.9 (L) 3.4 - 5.0 g/dL    Globulin 3.9 2.0 - 4.0 g/dL    A-G Ratio 0.7 (L) 0.8 - 1.7     CBC WITH AUTOMATED DIFF Collection Time: 07/15/20  5:35 AM   Result Value Ref Range    WBC 10.0 4.6 - 13.2 K/uL    RBC 3.96 (L) 4.20 - 5.30 M/uL    HGB 12.3 12.0 - 16.0 g/dL    HCT 35.7 35.0 - 45.0 %    MCV 90.2 74.0 - 97.0 FL    MCH 31.1 24.0 - 34.0 PG    MCHC 34.5 31.0 - 37.0 g/dL    RDW 13.2 11.6 - 14.5 %    PLATELET 318 745 - 019 K/uL    MPV PENDING FL    NEUTROPHILS 75 42 - 75 %    BAND NEUTROPHILS 5 0 - 5 %    LYMPHOCYTES 16 (L) 20 - 51 %    MONOCYTES 2 2 - 9 %    EOSINOPHILS 0 0 - 5 %    BASOPHILS 0 0 - 3 %    METAMYELOCYTES 2 (H) 0 %    ABS. NEUTROPHILS 8.0 1.8 - 8.0 K/UL    ABS. LYMPHOCYTES 1.6 0.8 - 3.5 K/UL    ABS. MONOCYTES 0.2 0 - 1.0 K/UL    ABS. EOSINOPHILS 0.0 0.0 - 0.4 K/UL    ABS. BASOPHILS 0.0 0.0 - 0.06 K/UL    DF MANUAL      PLATELET COMMENTS Increased Platelets      RBC COMMENTS NORMOCYTIC, NORMOCHROMIC     GLUCOSE, POC    Collection Time: 07/15/20  8:07 AM   Result Value Ref Range    Glucose (POC) 237 (H) 70 - 110 mg/dL       RECENT RESULTS  MODALITY IMPRESSION   XR Results from [place] encounter on 07/09/20   XR CHEST PORT    Narrative Portable CXR:    HISTORY: Respiratory distress. Covid 19    Comparison July 11, 2020 1706 hours    Stable cardiomegaly. No vascular congestion. Mild density in the lung bases,  grossly unchanged. Small pleural effusion. Impression IMPRESSION: No acute change with bilateral basilar infiltrate/pneumonia. No CHF. CT Results from [place] encounter on 04/08/20   CT ABD PELV WO CONT    Narrative CT ABDOMEN AND PELVIS WITHOUT ENHANCEMENT    INDICATION: Nausea, right lower quadrant pain, vomiting since this morning. TECHNIQUE: Axial images obtained of the abdomen and pelvis without intravenous  contrast. Coronal and sagittal reformatted images of the abdomen and pelvis were  obtained.     All CT scans at this facility are performed using dose optimization technique as  appropriate to a performed exam, to include automated exposure control,  adjustment of the mA and/or kV according to patient size (including appropriate  matching first site-specific examinations), or use of iterative reconstruction  technique. COMPARISON: 7/14/2019. Lack of intravenous contrast renders this study suboptimal for evaluating the  solid abdominal organs, vasculature and bowel. ABDOMEN FINDINGS:     Liver: Liver is 22.5 cm craniocaudal with mild decreased density. Spleen: Unremarkable. Pancreas: Unremarkable. Biliary: Cholecystectomy. Bowel: Mild diverticulosis. Similar small bowel in a periumbilical hernia. Peritoneum/ Retroperitoneum: Unremarkable. Lymph Nodes: Unremarkable. Adrenal Glands: Unremarkable. Kidneys: Unremarkable. Vessels: Moderate atherosclerosis. Multifocal stenosis of the visceral ostia. PELVIS FINDINGS:     Bladder/ Pelvic Organs: Unremarkable. Lung Base: Severe coronary arteriosclerosis left coronary artery and LAD. Centrilobular emphysema. Subsegmental atelectasis at the lung bases. Bones/Soft tissues: Degenerative changes bilateral hips. Degenerative disc  disease. Impression IMPRESSION:    Unchanged small bowel containing periumbilical hernia without associated  obstruction. Hepatomegaly and mild steatosis. Mild diverticulosis. Moderate atherosclerosis with multifocal visceral ostia stenosis. Severe left coronary arteriosclerosis. Emphysema. MRI No results found for this or any previous visit. ULTRASOUND Results from [place] encounter on 04/10/17   US ABD COMP    Impression IMPRESSION:       1. Echogenic mildly enlarged liver, suggestive of hepatic steatosis. No focal  hepatic lesion identified. 2. Status post cholecystectomy. No biliary ductal dilatation.         Cardiology Procedures/Testing:  MODALITY RESULTS   EKG Results for orders placed or performed during the hospital encounter of 07/09/20   EKG, 12 LEAD, INITIAL   Result Value Ref Range    Ventricular Rate 86 BPM    Atrial Rate 86 BPM P-R Interval 136 ms    QRS Duration 86 ms    Q-T Interval 354 ms    QTC Calculation (Bezet) 423 ms    Calculated P Axis 35 degrees    Calculated R Axis 32 degrees    Calculated T Axis 38 degrees    Diagnosis       Normal sinus rhythm  Normal ECG  When compared with ECG of 07-JUL-2020 16:09,  No significant change was found  Confirmed by Cassandra Cantu MD, Mateo Anaya ([id number]) on 7/10/2020 1:06:31 PM         ECHO 05/21/20   ECHO ADULT COMPLETE 05/25/2020 5/25/2020    Narrative · Image quality for this study was technically difficult. Contrast used:   DEFINITY. · Normal cavity size and systolic function (ejection fraction normal). Moderate concentric hypertrophy. Estimated left ventricular ejection   fraction is 65 - 70%. Visually measured ejection fraction. No regional   wall motion abnormality noted. Moderate (grade 2) left ventricular   diastolic dysfunction. · Mildly dilated left atrium. · Pulmonary hypertension. Pulmonary arterial systolic pressure is 61 mmHg. · Severely elevated central venous pressure (15+ mmHg); IVC diameter is   larger than 21 mm and collapses less than 50% with respiration.         Signed by: Roseanna Andrade MD        Special Testing/Procedures:  MODALITY RESULTS   MICRO All Micro Results     Procedure Component Value Units Date/Time    CULTURE, BLOOD [[id number]] Collected:  07/09/20 1553    Order Status:  Completed Specimen:  Blood Updated:  07/15/20 0712     Special Requests: NO SPECIAL REQUESTS        Culture result: NO GROWTH 6 DAYS       CULTURE, BLOOD [[id number]] Collected:  07/09/20 1543    Order Status:  Completed Specimen:  Blood Updated:  07/15/20 0712     Special Requests: NO SPECIAL REQUESTS        Culture result: NO GROWTH 6 DAYS       CULTURE, URINE [[id number]] Collected:  07/09/20 1916    Order Status:  Completed Specimen:  Urine from Clean catch Updated:  07/11/20 0925     Special Requests: NO SPECIAL REQUESTS        Culture result:       No significant growth, <10,000 CFU/mL UA No results found for this or any previous visit. PATH none     Telemetry Yes   Oxygen 3 l/m NC     Assessment and Plan:     Acute on chronic hypoxic respiratory failure. possibly 2/2 Covid PNA, COPD exacerbation: Pt w/ positive covid swab (7/7). Pt with severe underlying lung disease, Severe COPD on max therapy w/ strong asthma component. Hospitalized 5x in 2020 for COPD exacerbations. Followed by Dr. Sanford Fields in [place].   -NC and nightly BIPAP. Target sats 88-92  -Continue Brovana, Pulmicort, Solu-medrol  -FU pulmonary recs   -Treatment dose Lovenox   -Daily Covid labs: cbc, bmp, ferritin, LD, D-dimer, CRP, coags      Insulin dependent Type 2 Diabete: Home lantus 40u pm and novolog SSI. BS lowerthis   -hold Lantus at 45 U.  -SSI   -Lowering Steroid dosing, expecting to see drop in glucose further as it drops  -Accuchecks ACHS  -Diabetic diet     Hypertension, HFpEF: ECHO (5/24/20) YD 84 - 70%. No regional wall motion abnormality. Moderate (grade 2) left ventricular diastolic dysfunction. Mildly dilated left atrium. Pulmonary hypertension. Pulmonary arterial systolic pressure is 61 mmHg. Severely elevated central venous pressure (15+ mmHg); IVC diameter is larger than 21 mm and collapses less than 50% with respiration. SBP in ED elevated  to 140s-170s. Pt on lisinopril 20mg BID and HCTZ 12.5mg daily at home. Pt also on Lasix 20mg daily PRN for lower extremity edema at home. Patient endorsing increased swelling in  Abdomen and legs.       - Continue Lasix 20mg daily  - Continue Lisinopril 20mg BID  - Continue HCTZ 12.5 mg daily     GERD: Pt takes omeprazole 40mg daily and pepcid for breakthrough symptoms at home.   -Continue protonix 40mg daily     Morbid obesity  -Diabetic diet       Diet Diabetic   DVT Prophylaxis Lovenox   GI Prophylaxis Protonix   Code status Full   Disposition 1-2 days when stable     [place] [facility] [place] [address] [facility]  Relationship: Daughter  [phone]     Gypsy Burkitt, MD, PGY-1   [address]   Intern Pager: [phone]   July 15, 2020, 10:42 AM

## 2020-07-16 LAB
ANION GAP SERPL CALC-SCNC: 7 MMOL/L (ref 3–18)
APTT PPP: 26.5 SEC (ref 23–36.4)
BASOPHILS # BLD: 0 K/UL (ref 0–0.06)
BASOPHILS NFR BLD: 0 % (ref 0–3)
BUN SERPL-MCNC: 23 MG/DL (ref 7–18)
BUN/CREAT SERPL: 24 (ref 12–20)
CALCIUM SERPL-MCNC: 9.1 MG/DL (ref 8.5–10.1)
CHLORIDE SERPL-SCNC: 96 MMOL/L (ref 100–111)
CO2 SERPL-SCNC: 36 MMOL/L (ref 21–32)
CREAT SERPL-MCNC: 0.94 MG/DL (ref 0.6–1.3)
CRP SERPL-MCNC: 0.9 MG/DL (ref 0–0.3)
D DIMER PPP FEU-MCNC: 0.39 UG/ML(FEU)
DIFFERENTIAL METHOD BLD: ABNORMAL
EOSINOPHIL # BLD: 0 K/UL (ref 0–0.4)
EOSINOPHIL NFR BLD: 0 % (ref 0–5)
ERYTHROCYTE [DISTWIDTH] IN BLOOD BY AUTOMATED COUNT: 13.2 % (ref 11.6–14.5)
FERRITIN SERPL-MCNC: 81 NG/ML (ref 8–388)
FIBRINOGEN PPP-MCNC: 469 MG/DL (ref 210–451)
GLUCOSE BLD STRIP.AUTO-MCNC: 279 MG/DL (ref 70–110)
GLUCOSE BLD STRIP.AUTO-MCNC: 302 MG/DL (ref 70–110)
GLUCOSE BLD STRIP.AUTO-MCNC: 305 MG/DL (ref 70–110)
GLUCOSE BLD STRIP.AUTO-MCNC: 328 MG/DL (ref 70–110)
GLUCOSE SERPL-MCNC: 264 MG/DL (ref 74–99)
HCT VFR BLD AUTO: 35.7 % (ref 35–45)
HGB BLD-MCNC: 12.1 G/DL (ref 12–16)
INR PPP: 1.1 (ref 0.8–1.2)
LYMPHOCYTES # BLD: 0.5 K/UL (ref 0.8–3.5)
LYMPHOCYTES NFR BLD: 5 % (ref 20–51)
MCH RBC QN AUTO: 30.6 PG (ref 24–34)
MCHC RBC AUTO-ENTMCNC: 33.9 G/DL (ref 31–37)
MCV RBC AUTO: 90.4 FL (ref 74–97)
MONOCYTES # BLD: 0.8 K/UL (ref 0–1)
MONOCYTES NFR BLD: 7 % (ref 2–9)
NEUTS BAND NFR BLD MANUAL: 1 % (ref 0–5)
NEUTS SEG # BLD: 9.6 K/UL (ref 1.8–8)
NEUTS SEG NFR BLD: 87 % (ref 42–75)
PLATELET # BLD AUTO: 398 K/UL (ref 135–420)
PLATELET COMMENTS,PCOM: ABNORMAL
PMV BLD AUTO: 8.7 FL (ref 9.2–11.8)
POTASSIUM SERPL-SCNC: 4.6 MMOL/L (ref 3.5–5.5)
PROTHROMBIN TIME: 14 SEC (ref 11.5–15.2)
RBC # BLD AUTO: 3.95 M/UL (ref 4.2–5.3)
RBC MORPH BLD: ABNORMAL
SODIUM SERPL-SCNC: 139 MMOL/L (ref 136–145)
WBC # BLD AUTO: 10.9 K/UL (ref 4.6–13.2)

## 2020-07-16 PROCEDURE — 36415 COLL VENOUS BLD VENIPUNCTURE: CPT

## 2020-07-16 PROCEDURE — 86140 C-REACTIVE PROTEIN: CPT

## 2020-07-16 PROCEDURE — 74011250637 HC RX REV CODE- 250/637: Performed by: STUDENT IN AN ORGANIZED HEALTH CARE EDUCATION/TRAINING PROGRAM

## 2020-07-16 PROCEDURE — 85730 THROMBOPLASTIN TIME PARTIAL: CPT

## 2020-07-16 PROCEDURE — 74011250637 HC RX REV CODE- 250/637: Performed by: HOSPITALIST

## 2020-07-16 PROCEDURE — 80048 BASIC METABOLIC PNL TOTAL CA: CPT

## 2020-07-16 PROCEDURE — 74011636637 HC RX REV CODE- 636/637: Performed by: INTERNAL MEDICINE

## 2020-07-16 PROCEDURE — 65660000000 HC RM CCU STEPDOWN

## 2020-07-16 PROCEDURE — 85384 FIBRINOGEN ACTIVITY: CPT

## 2020-07-16 PROCEDURE — 74011250637 HC RX REV CODE- 250/637: Performed by: FAMILY MEDICINE

## 2020-07-16 PROCEDURE — 74011636637 HC RX REV CODE- 636/637: Performed by: FAMILY MEDICINE

## 2020-07-16 PROCEDURE — 85610 PROTHROMBIN TIME: CPT

## 2020-07-16 PROCEDURE — 77010033678 HC OXYGEN DAILY

## 2020-07-16 PROCEDURE — 82962 GLUCOSE BLOOD TEST: CPT

## 2020-07-16 PROCEDURE — 82728 ASSAY OF FERRITIN: CPT

## 2020-07-16 PROCEDURE — 85025 COMPLETE CBC W/AUTO DIFF WBC: CPT

## 2020-07-16 PROCEDURE — 74011250636 HC RX REV CODE- 250/636: Performed by: INTERNAL MEDICINE

## 2020-07-16 PROCEDURE — 74011636637 HC RX REV CODE- 636/637: Performed by: STUDENT IN AN ORGANIZED HEALTH CARE EDUCATION/TRAINING PROGRAM

## 2020-07-16 PROCEDURE — 85379 FIBRIN DEGRADATION QUANT: CPT

## 2020-07-16 RX ORDER — PREDNISONE 20 MG/1
40 TABLET ORAL DAILY
Status: DISCONTINUED | OUTPATIENT
Start: 2020-07-16 | End: 2020-07-18 | Stop reason: HOSPADM

## 2020-07-16 RX ORDER — INSULIN LISPRO 100 [IU]/ML
5 INJECTION, SOLUTION INTRAVENOUS; SUBCUTANEOUS
Status: DISCONTINUED | OUTPATIENT
Start: 2020-07-16 | End: 2020-07-18 | Stop reason: HOSPADM

## 2020-07-16 RX ADMIN — INSULIN LISPRO 5 UNITS: 100 INJECTION, SOLUTION INTRAVENOUS; SUBCUTANEOUS at 17:24

## 2020-07-16 RX ADMIN — PANTOPRAZOLE 40 MG: 40 TABLET, DELAYED RELEASE ORAL at 21:33

## 2020-07-16 RX ADMIN — INSULIN GLARGINE 45 UNITS: 100 INJECTION, SOLUTION SUBCUTANEOUS at 21:29

## 2020-07-16 RX ADMIN — ACETAMINOPHEN 500 MG: 500 TABLET ORAL at 21:26

## 2020-07-16 RX ADMIN — INSULIN LISPRO 12 UNITS: 100 INJECTION, SOLUTION INTRAVENOUS; SUBCUTANEOUS at 17:26

## 2020-07-16 RX ADMIN — ZINC SULFATE 220 MG (50 MG) CAPSULE 1 CAPSULE: CAPSULE at 09:36

## 2020-07-16 RX ADMIN — CHOLECALCIFEROL TAB 25 MCG (1000 UNIT) 2 TABLET: 25 TAB at 09:36

## 2020-07-16 RX ADMIN — METHYLPREDNISOLONE SODIUM SUCCINATE 40 MG: 40 INJECTION, POWDER, FOR SOLUTION INTRAMUSCULAR; INTRAVENOUS at 09:36

## 2020-07-16 RX ADMIN — Medication 500 MG: at 21:33

## 2020-07-16 RX ADMIN — BUDESONIDE AND FORMOTEROL FUMARATE DIHYDRATE 2 PUFF: 160; 4.5 AEROSOL RESPIRATORY (INHALATION) at 20:00

## 2020-07-16 RX ADMIN — ENOXAPARIN SODIUM 40 MG: 40 INJECTION SUBCUTANEOUS at 21:00

## 2020-07-16 RX ADMIN — PANTOPRAZOLE 40 MG: 40 TABLET, DELAYED RELEASE ORAL at 09:37

## 2020-07-16 RX ADMIN — INSULIN LISPRO 12 UNITS: 100 INJECTION, SOLUTION INTRAVENOUS; SUBCUTANEOUS at 09:37

## 2020-07-16 RX ADMIN — MONTELUKAST 10 MG: 10 TABLET, FILM COATED ORAL at 21:33

## 2020-07-16 RX ADMIN — INSULIN LISPRO 9 UNITS: 100 INJECTION, SOLUTION INTRAVENOUS; SUBCUTANEOUS at 12:53

## 2020-07-16 RX ADMIN — POLYETHYLENE GLYCOL 3350 17 G: 17 POWDER, FOR SOLUTION ORAL at 09:37

## 2020-07-16 RX ADMIN — BUDESONIDE AND FORMOTEROL FUMARATE DIHYDRATE 2 PUFF: 160; 4.5 AEROSOL RESPIRATORY (INHALATION) at 08:00

## 2020-07-16 RX ADMIN — ENOXAPARIN SODIUM 40 MG: 40 INJECTION SUBCUTANEOUS at 09:00

## 2020-07-16 RX ADMIN — FLUTICASONE PROPIONATE 2 SPRAY: 50 SPRAY, METERED NASAL at 09:00

## 2020-07-16 RX ADMIN — LORATADINE 10 MG: 10 TABLET ORAL at 09:37

## 2020-07-16 RX ADMIN — INSULIN LISPRO 12 UNITS: 100 INJECTION, SOLUTION INTRAVENOUS; SUBCUTANEOUS at 21:27

## 2020-07-16 RX ADMIN — LISINOPRIL 20 MG: 20 TABLET ORAL at 09:36

## 2020-07-16 RX ADMIN — Medication 500 MG: at 09:36

## 2020-07-16 RX ADMIN — FUROSEMIDE 20 MG: 20 TABLET ORAL at 09:37

## 2020-07-16 RX ADMIN — PREDNISONE 40 MG: 20 TABLET ORAL at 12:54

## 2020-07-16 RX ADMIN — HYDROCHLOROTHIAZIDE 12.5 MG: 25 TABLET ORAL at 09:36

## 2020-07-16 RX ADMIN — BENZONATATE 100 MG: 100 CAPSULE ORAL at 21:26

## 2020-07-16 NOTE — ROUTINE PROCESS
Bedside and verbal report received from Sarahi Wan (offgoing nurse). Report included the following information; SBAR, MAR, LABS, Intake/output, Kardex, and summary of care.

## 2020-07-16 NOTE — PROGRESS NOTES
[facility]  Pulmonary, Critical Care, and Sleep Medicine    Pulmonary Medicine Progress Note    Name: Kalee Low MRN: [id number]  : 1960 Hospital: [address] [place] [address]  Date: 2020       Subjective:  Doing well. Says she might closer to baseline. On 3L, wearing bipap at night. Patient Active Problem List   Diagnosis Code    Essential hypertension, benign I10    Diabetes mellitus, type 2 ([facility] [address] E11.9    Esophageal reflux K21.9    SHERRIE on CPAP G47.33, Z99.89    COPD (chronic obstructive pulmonary disease) ([facility]) J44.9    Allergic rhinitis J30.9    COPD with acute exacerbation ([facility]) J44.1    Obesity, Class III, BMI 40-49.9 (morbid obesity) ([facility]) E66.01    Chest pain R07.9    Dyspnea R06.00    COPD exacerbation ([facility]) J44.1    Acute exacerbation of COPD with asthma ([facility]) J44.1, Y22.447    Diastolic CHF, chronic ([facility]) I50.32    Diverticulosis K57.90    COPD with acute bronchitis ([facility]) J44.0, J20.9    Hyperlipidemia E78.5    Type 2 diabetes with nephropathy ([facility]) E11.21    Bilateral carotid bruits R09.89    Palpitations R00.2    Acute exacerbation of chronic obstructive pulmonary disease (COPD) ([facility]) J44.1    Atelectasis of right lung J98.11    Hypoxia R09.02    COVID-19 U07.1    Hypokalemia E87.6       Assessment:  · Acute on Chronic Hypoxic Respiratory Failure  · - 2/2 Covid19 Pneumonia  · Covid19 Pneumonia  · Severe Asthma/COPD  · - pt of [employer] [place]  · - currently on LAMA/LABA/ICS, chronic pred 5mg, azithromycin, awaiting approval for benralizumab  · SHERRIE on trilogy machine.     Impression/Plan:   · Continue NC during the day, bipap at night  · nebulizers- brovana and pulmicort or mdi  · Change to PO prednisone- recommend 2 week taper on DC  · LE dopplers negative, on ppx lovenox  · s/p remdesivir  · NO ABX needed  · Inflammatory markers much improved    May be appropriate for DC by tomorrow      FiO2 to keep SpO2 >=92%, HOB >=30 degree, aspiration precautions, aggressive pulmonary toileting, incentive spirometry. Other issues management by primary team and respective consultants. Events and notes from last 24 hours reviewed. Discussed with patient and family, answered all questions to their satisfaction. Care plan discussed with nursing.      Labs and images personally seen and available reports reviewed  All current medicines are reviewed       Medications- Current:  Current Facility-Administered Medications   Medication Dose Route Frequency    polyethylene glycol (MIRALAX) packet 17 g  17 g Oral DAILY    methylPREDNISolone (PF) (SOLU-MEDROL) injection 40 mg  40 mg IntraVENous Q12H    budesonide-formoteroL (SYMBICORT) 160-4.5 mcg/actuation HFA inhaler 2 Puff  2 Puff Inhalation BID RT    insulin glargine (LANTUS) injection 45 Units  45 Units SubCUTAneous QHS    enoxaparin (LOVENOX) injection 40 mg  40 mg SubCUTAneous Q12H    benzonatate (TESSALON) capsule 100 mg  100 mg Oral Q8H PRN    dextrose 10% infusion 125-250 mL  125-250 mL IntraVENous PRN    albuterol (PROVENTIL HFA, VENTOLIN HFA, PROAIR HFA) inhaler 2 Puff  2 Puff Inhalation Q4H PRN    sodium chloride (NS) flush 5-10 mL  5-10 mL IntraVENous PRN    insulin lispro (HUMALOG) injection   SubCUTAneous AC&HS    glucose chewable tablet 16 g  4 Tab Oral PRN    glucagon (GLUCAGEN) injection 1 mg  1 mg IntraMUSCular PRN    albuterol-ipratropium (DUO-NEB) 2.5 MG-0.5 MG/3 ML  3 mL Nebulization Q4H PRN    albuterol (ACCUNEB) nebulizer solution 1.25 mg  1.25 mg Nebulization Q4H PRN    acetaminophen (TYLENOL) tablet 500 mg  500 mg Oral Q6H PRN    Or    acetaminophen (TYLENOL) suppository 500 mg  500 mg Rectal Q6H PRN    ascorbic acid (vitamin C) (VITAMIN C) tablet 500 mg  500 mg Oral BID    cholecalciferol (VITAMIN D3) (1000 Units /25 mcg) tablet 2 Tab  2,000 Units Oral DAILY    zinc sulfate (ZINCATE) 220 (50) mg capsule 1 Cap  1 Cap Oral DAILY    furosemide (LASIX) tablet 20 mg  20 mg Oral DAILY    lisinopriL (PRINIVIL, ZESTRIL) tablet 20 mg  20 mg Oral DAILY    hydroCHLOROthiazide (HYDRODIURIL) tablet 12.5 mg  12.5 mg Oral DAILY    pantoprazole (PROTONIX) tablet 40 mg  40 mg Oral BID    loratadine (CLARITIN) tablet 10 mg  10 mg Oral DAILY    fluticasone propionate (FLONASE) 50 mcg/actuation nasal spray 2 Spray  2 Spray Both Nostrils DAILY    montelukast (SINGULAIR) tablet 10 mg  10 mg Oral QHS       Objective:  Vital Signs:    Visit Vitals  /69 (BP 1 Location: Left arm, BP Patient Position: At rest)   Pulse (!) 50   Temp 97.9 °F (36.6 °C)   Resp 21   Ht 5' 3\" (1.6 m)   Wt 121.5 kg (267 lb 12.8 oz)   SpO2 94%   BMI 47.44 kg/m²      O2 Device: Nasal cannula  O2 Flow Rate (L/min): 3 l/min  Temp (24hrs), Av °F (36.7 °C), Min:97.6 °F (36.4 °C), Max:98.6 °F (37 °C)      Intake/Output:   Last shift:      No intake/output data recorded. Last 3 shifts:  1901 -  0700  In: -   Out: 1000 [Urine:1000]  No intake or output data in the 24 hours ending 20 1040    Physical Exam:     General/Neurology: Alert, Awake, on NC  Head:   Normocephalic, without obvious abnormality  Eye:   PERRL, EOM intact  Oral:  Mucus membranes moist  Lung:   B/l air entry fair. No rales. No rhonchi. No wheezing, Decreased BS  Heart:   S1 S2 present  Abdomen:  Soft, non tender, BS+nt   Extremities:  No pedal edema.    Skin:   Dry, intact  Data:      Recent Results (from the past 24 hour(s))   GLUCOSE, POC    Collection Time: 07/15/20 11:34 AM   Result Value Ref Range    Glucose (POC) 267 (H) 70 - 110 mg/dL   GLUCOSE, POC    Collection Time: 07/15/20  3:58 PM   Result Value Ref Range    Glucose (POC) 283 (H) 70 - 110 mg/dL   GLUCOSE, POC    Collection Time: 07/15/20  9:19 PM   Result Value Ref Range    Glucose (POC) 294 (H) 70 - 110 mg/dL   C REACTIVE PROTEIN, QT    Collection Time: 20  3:30 AM   Result Value Ref Range    C-Reactive protein 0.9 (H) 0 - 0.3 mg/dL   CBC WITH AUTOMATED DIFF Collection Time: 07/16/20  3:30 AM   Result Value Ref Range    WBC 10.9 4.6 - 13.2 K/uL    RBC 3.95 (L) 4.20 - 5.30 M/uL    HGB 12.1 12.0 - 16.0 g/dL    HCT 35.7 35.0 - 45.0 %    MCV 90.4 74.0 - 97.0 FL    MCH 30.6 24.0 - 34.0 PG    MCHC 33.9 31.0 - 37.0 g/dL    RDW 13.2 11.6 - 14.5 %    PLATELET 440 939 - 617 K/uL    MPV 8.7 (L) 9.2 - 11.8 FL    NEUTROPHILS 87 (H) 42 - 75 %    BAND NEUTROPHILS 1 0 - 5 %    LYMPHOCYTES 5 (L) 20 - 51 %    MONOCYTES 7 2 - 9 %    EOSINOPHILS 0 0 - 5 %    BASOPHILS 0 0 - 3 %    ABS. NEUTROPHILS 9.6 (H) 1.8 - 8.0 K/UL    ABS. LYMPHOCYTES 0.5 (L) 0.8 - 3.5 K/UL    ABS. MONOCYTES 0.8 0 - 1.0 K/UL    ABS. EOSINOPHILS 0.0 0.0 - 0.4 K/UL    ABS.  BASOPHILS 0.0 0.0 - 0.06 K/UL    DF MANUAL      PLATELET COMMENTS ADEQUATE PLATELETS      RBC COMMENTS NORMOCYTIC, NORMOCHROMIC     FERRITIN    Collection Time: 07/16/20  3:30 AM   Result Value Ref Range    Ferritin 81 8 - 388 NG/ML   D DIMER    Collection Time: 07/16/20  3:30 AM   Result Value Ref Range    D DIMER 0.39 <0.46 ug/ml(FEU)   FIBRINOGEN    Collection Time: 07/16/20  3:30 AM   Result Value Ref Range    Fibrinogen 469 (H) 210 - 451 mg/dL   PROTHROMBIN TIME + INR    Collection Time: 07/16/20  3:30 AM   Result Value Ref Range    Prothrombin time 14.0 11.5 - 15.2 sec    INR 1.1 0.8 - 1.2     PTT    Collection Time: 07/16/20  3:30 AM   Result Value Ref Range    aPTT 26.5 23.0 - 33.0 SEC   METABOLIC PANEL, BASIC    Collection Time: 07/16/20  3:30 AM   Result Value Ref Range    Sodium 139 136 - 145 mmol/L    Potassium 4.6 3.5 - 5.5 mmol/L    Chloride 96 (L) 100 - 111 mmol/L    CO2 36 (H) 21 - 32 mmol/L    Anion gap 7 3.0 - 18 mmol/L    Glucose 264 (H) 74 - 99 mg/dL    BUN 23 (H) 7.0 - 18 MG/DL    Creatinine 0.94 0.6 - 1.3 MG/DL    BUN/Creatinine ratio 24 (H) 12 - 20      GFR est AA >60 >60 ml/min/1.73m2    GFR est non-AA >60 >60 ml/min/1.73m2    Calcium 9.1 8.5 - 10.1 MG/DL   GLUCOSE, POC    Collection Time: 07/16/20  7:22 AM   Result Value Ref Range    Glucose (POC) 305 (H) 70 - 110 mg/dL         Chemistry   Recent Labs     07/16/20  0330 07/15/20  0535 07/14/20  0322   * 274* 331*    137 138   K 4.6 3.8 3.8   CL 96* 97* 99*   CO2 36* 35* 33*   BUN 23* 26* 29*   CREA 0.94 0.92 1.00   CA 9.1 9.4 9.2   AGAP 7 5 6   BUCR 24* 28* 29*   AP  --  75 77   TP  --  6.8 6.6   ALB  --  2.9* 2.6*   GLOB  --  3.9 4.0   AGRAT  --  0.7* 0.7*       CBC w/Diff   Recent Labs     07/16/20  0330 07/15/20  0535 07/14/20  0322   WBC 10.9 10.0 6.7   RBC 3.95* 3.96* 3.75*   HGB 12.1 12.3 11.3*   HCT 35.7 35.7 34.1*    414 351   GRANS 87* 75 85*   LYMPH 5* 16* 12*   EOS 0 0 0       ABG No results for input(s): PHI, PHI, POC2, PCO2I, PO2, PO2I, HCO3, HCO3I, FIO2, FIO2I in the last 72 hours. Micro  No results for input(s): SDES, CULT in the last 72 hours. No results for input(s): CULT in the last 72 hours. CT (Most Recent)   Results from Hospital Encounter encounter on 04/08/20   CT ABD PELV WO CONT    Narrative CT ABDOMEN AND PELVIS WITHOUT ENHANCEMENT    INDICATION: Nausea, right lower quadrant pain, vomiting since this morning. TECHNIQUE: Axial images obtained of the abdomen and pelvis without intravenous  contrast. Coronal and sagittal reformatted images of the abdomen and pelvis were  obtained. All CT scans at this facility are performed using dose optimization technique as  appropriate to a performed exam, to include automated exposure control,  adjustment of the mA and/or kV according to patient size (including appropriate  matching first site-specific examinations), or use of iterative reconstruction  technique. COMPARISON: 7/14/2019. Lack of intravenous contrast renders this study suboptimal for evaluating the  solid abdominal organs, vasculature and bowel. ABDOMEN FINDINGS:     Liver: Liver is 22.5 cm craniocaudal with mild decreased density. Spleen: Unremarkable. Pancreas: Unremarkable. Biliary: Cholecystectomy.   Bowel: Mild diverticulosis. Similar small bowel in a periumbilical hernia. Peritoneum/ Retroperitoneum: Unremarkable. Lymph Nodes: Unremarkable. Adrenal Glands: Unremarkable. Kidneys: Unremarkable. Vessels: Moderate atherosclerosis. Multifocal stenosis of the visceral ostia. PELVIS FINDINGS:     Bladder/ Pelvic Organs: Unremarkable. Lung Base: Severe coronary arteriosclerosis left coronary artery and LAD. Centrilobular emphysema. Subsegmental atelectasis at the lung bases. Bones/Soft tissues: Degenerative changes bilateral hips. Degenerative disc  disease. Impression IMPRESSION:    Unchanged small bowel containing periumbilical hernia without associated  obstruction. Hepatomegaly and mild steatosis. Mild diverticulosis. Moderate atherosclerosis with multifocal visceral ostia stenosis. Severe left coronary arteriosclerosis. Emphysema. XR (Most Recent). CXR reviewed by me and compared with previous CXR   Results from Hospital Encounter encounter on 07/09/20   XR CHEST PORT    Narrative Portable CXR:    HISTORY: Respiratory distress. Covid 19    Comparison July 11, 2020 1706 hours    Stable cardiomegaly. No vascular congestion. Mild density in the lung bases,  grossly unchanged. Small pleural effusion. Impression IMPRESSION: No acute change with bilateral basilar infiltrate/pneumonia. No CHF. See my orders for details     Total care time exclusive of procedures with complex decision making, coordination of care and counseling patient performed and > 50% time spent in face to face evaluation as mentioned above.     Kelli Hanley MD  Critical Care Medicine

## 2020-07-16 NOTE — DIABETES MGMT
Glycemic Control Plan of Care    Patient in [facility]. T2DM with A1c of 8.4% (5/22/2020). Home diabetes medications:   Basaglar (lantus) pen insulin 40 units daily  Novolog slidings scale insulin    Patient is currently on prednisone 40 mg daily. POC BG values on 07/15/2020: 237, 267, 283, 294. POC BG values on 07/16/2020 at time of review: 305, 279. Results for Claudia Mistry (MRN [id number]) as of 7/16/2020 15:34   Ref. Range 7/15/2020 05:35 7/16/2020 03:30   Glucose Latest Ref Range: 74 - 99 mg/dL 274 (H) 264 (H)       Recommendation(s): add meal time lispro insulin 5 units TID AC. Order obtained. Current hospital diabetes medications:  Basal lantus insulin 45 units daily at bedtime. Correctional lispro insulin ACHS. Very resistant dose  Meal time lispro insulin 5 units TID AC    Total daily dose insulin requirement previous day: 07/15/2020  Lantus: 45 units  Lispro: 33 units  TDD insulin: 78 units    Assessment:  Patient is 60year old with PMH including type 2 diabetes mellitus, severe COPD, asthma, hypertension, and sleep apnea - was admitted on 07/09/2020 with report of diarrhea, myalgias, fevers, headache and difficulty breathing. Noted:  Acute on chronic respiratory failure  COVID-19 pneumonia  Severe asthma/COPD  T2DM    Most recent blood glucose values:        Current A1C:     Lab Results   Component Value Date/Time    Hemoglobin A1c 8.4 (H) 05/22/2020 05:56 AM     This lab test reflects the patient's estimated average blood glucose of 194 mg/dL during the previous 3 months. Diet: Diabetic consistent carb 1500-1600kcal; regular; 2 Gm NA    Goals:  Blood glucose will be within target range of  mg/dL by 07/19/2020.     Education:  ___  Refer to Diabetes Education Record             _X__  Education not indicated at this time    Charisse Puente RN [facility]  Pager: [phone]

## 2020-07-16 NOTE — PROGRESS NOTES
[facility]  Progress Note    Patient: Cinthia Cain MRN: [id number]   SSN: [id number]  YOB: 1960   Age: 60 y.o. Sex: female      Admit Date: 7/9/2020    LOS: 7 days   Chief Complaint   Patient presents with    Concern For COVID-19 (Coronavirus)    Shortness of Breath       Subjective:     Per nurse, patient was well overnight. Attempted to call patient, but did not get answer. continuing to stay on 3 lpm, sats at 92-95%. VSS, and patient seems well overall. Will defer full exam and Physical due to COVID     ROS as in subjective    Objective:     Visit Vitals  /69 (BP 1 Location: Left arm, BP Patient Position: At rest)   Pulse (!) 50   Temp 97.9 °F (36.6 °C)   Resp 21   Ht 5' 3\" (1.6 m)   Wt 121.5 kg (267 lb 12.8 oz)   SpO2 94%   BMI 47.44 kg/m²       Physical Exam:   Deferred     Intake and Output:  Current Shift: No intake/output data recorded. Last three shifts: 07/14 1901 - 07/16 0700  In: -   Out: 1000 [Urine:1000]    Lab/Data Review:  Recent Results (from the past 12 hour(s))   GLUCOSE, POC    Collection Time: 07/15/20  9:19 PM   Result Value Ref Range    Glucose (POC) 294 (H) 70 - 110 mg/dL   C REACTIVE PROTEIN, QT    Collection Time: 07/16/20  3:30 AM   Result Value Ref Range    C-Reactive protein 0.9 (H) 0 - 0.3 mg/dL   CBC WITH AUTOMATED DIFF    Collection Time: 07/16/20  3:30 AM   Result Value Ref Range    WBC 10.9 4.6 - 13.2 K/uL    RBC 3.95 (L) 4.20 - 5.30 M/uL    HGB 12.1 12.0 - 16.0 g/dL    HCT 35.7 35.0 - 45.0 %    MCV 90.4 74.0 - 97.0 FL    MCH 30.6 24.0 - 34.0 PG    MCHC 33.9 31.0 - 37.0 g/dL    RDW 13.2 11.6 - 14.5 %    PLATELET 364 460 - 699 K/uL    MPV 8.7 (L) 9.2 - 11.8 FL    NEUTROPHILS 87 (H) 42 - 75 %    BAND NEUTROPHILS 1 0 - 5 %    LYMPHOCYTES 5 (L) 20 - 51 %    MONOCYTES 7 2 - 9 %    EOSINOPHILS 0 0 - 5 %    BASOPHILS 0 0 - 3 %    ABS. NEUTROPHILS 9.6 (H) 1.8 - 8.0 K/UL    ABS. LYMPHOCYTES 0.5 (L) 0.8 - 3.5 K/UL    ABS.  MONOCYTES 0.8 0 - 1.0 K/UL    ABS. EOSINOPHILS 0.0 0.0 - 0.4 K/UL    ABS. BASOPHILS 0.0 0.0 - 0.06 K/UL    DF MANUAL      PLATELET COMMENTS ADEQUATE PLATELETS      RBC COMMENTS NORMOCYTIC, NORMOCHROMIC     FERRITIN    Collection Time: 07/16/20  3:30 AM   Result Value Ref Range    Ferritin 81 8 - 388 NG/ML   D DIMER    Collection Time: 07/16/20  3:30 AM   Result Value Ref Range    D DIMER 0.39 <0.46 ug/ml(FEU)   FIBRINOGEN    Collection Time: 07/16/20  3:30 AM   Result Value Ref Range    Fibrinogen 469 (H) 210 - 451 mg/dL   PROTHROMBIN TIME + INR    Collection Time: 07/16/20  3:30 AM   Result Value Ref Range    Prothrombin time 14.0 11.5 - 15.2 sec    INR 1.1 0.8 - 1.2     PTT    Collection Time: 07/16/20  3:30 AM   Result Value Ref Range    aPTT 26.5 23.0 - 24.0 SEC   METABOLIC PANEL, BASIC    Collection Time: 07/16/20  3:30 AM   Result Value Ref Range    Sodium 139 136 - 145 mmol/L    Potassium 4.6 3.5 - 5.5 mmol/L    Chloride 96 (L) 100 - 111 mmol/L    CO2 36 (H) 21 - 32 mmol/L    Anion gap 7 3.0 - 18 mmol/L    Glucose 264 (H) 74 - 99 mg/dL    BUN 23 (H) 7.0 - 18 MG/DL    Creatinine 0.94 0.6 - 1.3 MG/DL    BUN/Creatinine ratio 24 (H) 12 - 20      GFR est AA >60 >60 ml/min/1.73m2    GFR est non-AA >60 >60 ml/min/1.73m2    Calcium 9.1 8.5 - 10.1 MG/DL   GLUCOSE, POC    Collection Time: 07/16/20  7:22 AM   Result Value Ref Range    Glucose (POC) 305 (H) 70 - 110 mg/dL       RECENT RESULTS  MODALITY IMPRESSION   XR Results from Hospital Encounter encounter on 07/09/20   XR CHEST PORT    Narrative Portable CXR:    HISTORY: Respiratory distress. Covid 19    Comparison July 11, 2020 1706 hours    Stable cardiomegaly. No vascular congestion. Mild density in the lung bases,  grossly unchanged. Small pleural effusion. Impression IMPRESSION: No acute change with bilateral basilar infiltrate/pneumonia. No CHF.       CT Results from [place] encounter on 04/08/20   CT ABD PELV WO CONT    Narrative CT ABDOMEN AND PELVIS WITHOUT ENHANCEMENT    INDICATION: Nausea, right lower quadrant pain, vomiting since this morning. TECHNIQUE: Axial images obtained of the abdomen and pelvis without intravenous  contrast. Coronal and sagittal reformatted images of the abdomen and pelvis were  obtained. All CT scans at this facility are performed using dose optimization technique as  appropriate to a performed exam, to include automated exposure control,  adjustment of the mA and/or kV according to patient size (including appropriate  matching first site-specific examinations), or use of iterative reconstruction  technique. COMPARISON: 7/14/2019. Lack of intravenous contrast renders this study suboptimal for evaluating the  solid abdominal organs, vasculature and bowel. ABDOMEN FINDINGS:     Liver: Liver is 22.5 cm craniocaudal with mild decreased density. Spleen: Unremarkable. Pancreas: Unremarkable. Biliary: Cholecystectomy. Bowel: Mild diverticulosis. Similar small bowel in a periumbilical hernia. Peritoneum/ Retroperitoneum: Unremarkable. Lymph Nodes: Unremarkable. Adrenal Glands: Unremarkable. Kidneys: Unremarkable. Vessels: Moderate atherosclerosis. Multifocal stenosis of the visceral ostia. PELVIS FINDINGS:     Bladder/ Pelvic Organs: Unremarkable. Lung Base: Severe coronary arteriosclerosis left coronary artery and LAD. Centrilobular emphysema. Subsegmental atelectasis at the lung bases. Bones/Soft tissues: Degenerative changes bilateral hips. Degenerative disc  disease. Impression IMPRESSION:    Unchanged small bowel containing periumbilical hernia without associated  obstruction. Hepatomegaly and mild steatosis. Mild diverticulosis. Moderate atherosclerosis with multifocal visceral ostia stenosis. Severe left coronary arteriosclerosis. Emphysema. MRI No results found for this or any previous visit.    ULTRASOUND Results from [place] encounter on 04/10/17   US ABD COMP Impression IMPRESSION:       1. Echogenic mildly enlarged liver, suggestive of hepatic steatosis. No focal  hepatic lesion identified. 2. Status post cholecystectomy. No biliary ductal dilatation. Cardiology Procedures/Testing:  MODALITY RESULTS   EKG Results for orders placed or performed during the hospital encounter of 07/09/20   EKG, 12 LEAD, INITIAL   Result Value Ref Range    Ventricular Rate 86 BPM    Atrial Rate 86 BPM    P-R Interval 136 ms    QRS Duration 86 ms    Q-T Interval 354 ms    QTC Calculation (Bezet) 423 ms    Calculated P Axis 35 degrees    Calculated R Axis 32 degrees    Calculated T Axis 38 degrees    Diagnosis       Normal sinus rhythm  Normal ECG  When compared with ECG of 07-JUL-2020 16:09,  No significant change was found  Confirmed by Jose Carlos Morgan MD, Anny Winn ([id number]) on 7/10/2020 1:06:31 PM         ECHO 05/21/20   ECHO ADULT COMPLETE 05/25/2020 5/25/2020    Narrative · Image quality for this study was technically difficult. Contrast used:   DEFINITY. · Normal cavity size and systolic function (ejection fraction normal). Moderate concentric hypertrophy. Estimated left ventricular ejection   fraction is 65 - 70%. Visually measured ejection fraction. No regional   wall motion abnormality noted. Moderate (grade 2) left ventricular   diastolic dysfunction. · Mildly dilated left atrium. · Pulmonary hypertension. Pulmonary arterial systolic pressure is 61 mmHg. · Severely elevated central venous pressure (15+ mmHg); IVC diameter is   larger than 21 mm and collapses less than 50% with respiration.         Signed by: Allan Smith MD        Special Testing/Procedures:  MODALITY RESULTS   MICRO All Micro Results     Procedure Component Value Units Date/Time    CULTURE, BLOOD [[id number]] Collected:  07/09/20 1553    Order Status:  Completed Specimen:  Blood Updated:  07/15/20 0712     Special Requests: NO SPECIAL REQUESTS        Culture result: NO GROWTH 6 DAYS       CULTURE, BLOOD [[id number]] Collected:  07/09/20 1543    Order Status:  Completed Specimen:  Blood Updated:  07/15/20 0712     Special Requests: NO SPECIAL REQUESTS        Culture result: NO GROWTH 6 DAYS       CULTURE, URINE [[id number]] Collected:  07/09/20 1916    Order Status:  Completed Specimen:  Urine from Clean catch Updated:  07/11/20 0925     Special Requests: NO SPECIAL REQUESTS        Culture result:       No significant growth, <10,000 CFU/mL               UA No results found for this or any previous visit. PATH none     Telemetry Yes   Oxygen 3 l/m NC     Assessment and Plan:     Acute on chronic hypoxic respiratory failure. possibly 2/2 Covid PNA, COPD exacerbation: Pt w/ positive covid swab (7/7). Pt with severe underlying lung disease, Severe COPD on max therapy w/ strong asthma component. Hospitalized 5x in 2020 for COPD exacerbations. Followed by Dr. Jigna Post in [place].   -NC and nightly BIPAP. Target sats 88-92  -Continue Brovana, Pulmicort, Solu-medrol  -FU pulmonary recs   -Treatment dose Lovenox   -Daily Covid labs: cbc, bmp, ferritin, LD, D-dimer, CRP, coags      Insulin dependent Type 2 Diabete: Home lantus 40u pm and novolog SSI.  this AM  -stay at Lantus at 45 U.  -SSI   -Lowering Steroid dosing, expecting to see drop in glucose further as it drops  -Accuchecks ACHS  -Diabetic diet     Hypertension, HFpEF: ECHO (5/24/20) SZ 33 - 70%. No regional wall motion abnormality. Moderate (grade 2) left ventricular diastolic dysfunction. Mildly dilated left atrium. Pulmonary hypertension. Pulmonary arterial systolic pressure is 61 mmHg. Severely elevated central venous pressure (15+ mmHg); IVC diameter is larger than 21 mm and collapses less than 50% with respiration. SBP in ED elevated  to 140s-170s. Pt on lisinopril 20mg BID and HCTZ 12.5mg daily at home. Pt also on Lasix 20mg daily PRN for lower extremity edema at home. Patient endorsing increased swelling in  Abdomen and legs.       - Continue Lasix 20mg daily  - Continue Lisinopril 20mg BID  - Continue HCTZ 12.5 mg daily     GERD: Pt takes omeprazole 40mg daily and pepcid for breakthrough symptoms at home.   -Continue protonix 40mg daily     Morbid obesity  -Diabetic diet       Diet Diabetic   DVT Prophylaxis Lovenox   GI Prophylaxis Protonix   Code status Full   Disposition 1-2 days when stable     Point of Contact Shamar Spencer  Relationship: Daughter  [phone]     Benji Chang MD, PGY-1   [address]   Intern Pager: [phone]   July 16, 2020, 9:08 AM

## 2020-07-16 NOTE — PROGRESS NOTES
Comprehensive Nutrition Assessment    Type and Reason for Visit: Initial    Nutrition Recommendations/Plan:  - Continue bowel regimen. - Monitor and encourage po intake as tolerated. Nutrition Assessment:  Unable to reach pt via telephone for remote assessment, information obtained via chart. COVID positive. Good intake. Hyperglycemia, glycemic control following    Malnutrition Assessment:  Malnutrition Status:  No malnutrition     Nutrition History and Allergies: Allergy to fish. Estimated Daily Nutrient Needs:  Energy (kcal):  4376-1219  Protein (g):         Fluid (ml/day):  4313-2902    Nutrition Related Findings:  Last BM 7/11, miralax started.       Wounds:    None       Current Nutrition Therapies:   DIET DIABETIC WITH OPTIONS Consistent Carb 1500-1600kcal; Regular; 2 GM NA (House Low NA); AHA-LOW-CHOL FAT    Anthropometric Measures:  · Height:  5' 3\" (160 cm)(previously documented)  · Current Body Wt:  121.5 kg (267 lb 13.7 oz)(previously documented)   · Ideal Body Wt:  115:  232.9 %     · BMI Categories:  Obese class 3 (BMI 40.0 or greater)       Nutrition Diagnosis:   · Altered GI function related to (inadequate fiber intake and inadequate mobalization) as evidenced by constipation    Nutrition Interventions:   Food and/or Nutrient Delivery: Continue current diet  Nutrition Education and Counseling: No recommendations at this time  Coordination of Nutrition Care: Continued inpatient monitoring    Goals:  PO nutrition intake will continue to meet >75% of patients estimated nutritional needs over the next 7 days       Nutrition Monitoring and Evaluation:   Behavioral-Environmental Outcomes:    Food/Nutrient Intake Outcomes: Diet advancement/tolerance, Food and nutrient intake  Physical Signs/Symptoms Outcomes: Biochemical data, Constipation    Discharge Planning:    No discharge needs at this time     Electronically signed by Asa Dietz RD on 7/16/2020 at 4:18 PM    Contact: [phone]

## 2020-07-16 NOTE — PROGRESS NOTES
Problem: Falls - Risk of  Goal: *Absence of Falls  Description: Document Demetrius Brown Fall Risk and appropriate interventions in the flowsheet.   Note: Fall Risk Interventions:  Mobility Interventions: Bed/chair exit alarm         Medication Interventions: Teach patient to arise slowly    Elimination Interventions: Call light in reach

## 2020-07-16 NOTE — ROUTINE PROCESS
Bedside shift change report given to Aide VALDEZ (oncoming nurse) by Roseanna Andrade (offgoing nurse). Report given with SBAR, Kardex, Intake/Output, MAR and Recent Results.

## 2020-07-17 LAB
ANION GAP SERPL CALC-SCNC: 8 MMOL/L (ref 3–18)
BASOPHILS # BLD: 0 K/UL (ref 0–0.06)
BASOPHILS NFR BLD: 0 % (ref 0–3)
BUN SERPL-MCNC: 23 MG/DL (ref 7–18)
BUN/CREAT SERPL: 20 (ref 12–20)
CALCIUM SERPL-MCNC: 9.4 MG/DL (ref 8.5–10.1)
CHLORIDE SERPL-SCNC: 98 MMOL/L (ref 100–111)
CO2 SERPL-SCNC: 32 MMOL/L (ref 21–32)
CREAT SERPL-MCNC: 1.14 MG/DL (ref 0.6–1.3)
CRP SERPL-MCNC: 0.7 MG/DL (ref 0–0.3)
DIFFERENTIAL METHOD BLD: ABNORMAL
EOSINOPHIL # BLD: 0 K/UL (ref 0–0.4)
EOSINOPHIL NFR BLD: 0 % (ref 0–5)
ERYTHROCYTE [DISTWIDTH] IN BLOOD BY AUTOMATED COUNT: 13.4 % (ref 11.6–14.5)
GLUCOSE BLD STRIP.AUTO-MCNC: 139 MG/DL (ref 70–110)
GLUCOSE BLD STRIP.AUTO-MCNC: 176 MG/DL (ref 70–110)
GLUCOSE BLD STRIP.AUTO-MCNC: 262 MG/DL (ref 70–110)
GLUCOSE BLD STRIP.AUTO-MCNC: 363 MG/DL (ref 70–110)
GLUCOSE BLD STRIP.AUTO-MCNC: 407 MG/DL (ref 70–110)
GLUCOSE SERPL-MCNC: 265 MG/DL (ref 74–99)
HCT VFR BLD AUTO: 34.8 % (ref 35–45)
HGB BLD-MCNC: 12 G/DL (ref 12–16)
LYMPHOCYTES # BLD: 0.9 K/UL (ref 0.8–3.5)
LYMPHOCYTES NFR BLD: 9 % (ref 20–51)
MAGNESIUM SERPL-MCNC: 0.8 MG/DL (ref 1.6–2.6)
MAGNESIUM SERPL-MCNC: 2.6 MG/DL (ref 1.6–2.6)
MCH RBC QN AUTO: 30.8 PG (ref 24–34)
MCHC RBC AUTO-ENTMCNC: 34.5 G/DL (ref 31–37)
MCV RBC AUTO: 89.5 FL (ref 74–97)
MONOCYTES # BLD: 1.4 K/UL (ref 0–1)
MONOCYTES NFR BLD: 14 % (ref 2–9)
NEUTS BAND NFR BLD MANUAL: 3 % (ref 0–5)
NEUTS SEG # BLD: 7.9 K/UL (ref 1.8–8)
NEUTS SEG NFR BLD: 74 % (ref 42–75)
NRBC BLD-RTO: 2 PER 100 WBC
PHOSPHATE SERPL-MCNC: 0.7 MG/DL (ref 2.5–4.9)
PHOSPHATE SERPL-MCNC: 3.3 MG/DL (ref 2.5–4.9)
PLATELET # BLD AUTO: 389 K/UL (ref 135–420)
PLATELET COMMENTS,PCOM: ABNORMAL
PMV BLD AUTO: 9.1 FL (ref 9.2–11.8)
POTASSIUM SERPL-SCNC: 4.3 MMOL/L (ref 3.5–5.5)
RBC # BLD AUTO: 3.89 M/UL (ref 4.2–5.3)
RBC MORPH BLD: ABNORMAL
SODIUM SERPL-SCNC: 138 MMOL/L (ref 136–145)
WBC # BLD AUTO: 10.2 K/UL (ref 4.6–13.2)

## 2020-07-17 PROCEDURE — 80048 BASIC METABOLIC PNL TOTAL CA: CPT

## 2020-07-17 PROCEDURE — 86140 C-REACTIVE PROTEIN: CPT

## 2020-07-17 PROCEDURE — 82962 GLUCOSE BLOOD TEST: CPT

## 2020-07-17 PROCEDURE — 74011636637 HC RX REV CODE- 636/637: Performed by: STUDENT IN AN ORGANIZED HEALTH CARE EDUCATION/TRAINING PROGRAM

## 2020-07-17 PROCEDURE — 74011250637 HC RX REV CODE- 250/637: Performed by: HOSPITALIST

## 2020-07-17 PROCEDURE — 65660000000 HC RM CCU STEPDOWN

## 2020-07-17 PROCEDURE — 74011250636 HC RX REV CODE- 250/636: Performed by: STUDENT IN AN ORGANIZED HEALTH CARE EDUCATION/TRAINING PROGRAM

## 2020-07-17 PROCEDURE — 97110 THERAPEUTIC EXERCISES: CPT

## 2020-07-17 PROCEDURE — 74011250637 HC RX REV CODE- 250/637: Performed by: STUDENT IN AN ORGANIZED HEALTH CARE EDUCATION/TRAINING PROGRAM

## 2020-07-17 PROCEDURE — 36415 COLL VENOUS BLD VENIPUNCTURE: CPT

## 2020-07-17 PROCEDURE — 74011250636 HC RX REV CODE- 250/636: Performed by: INTERNAL MEDICINE

## 2020-07-17 PROCEDURE — 77010033678 HC OXYGEN DAILY

## 2020-07-17 PROCEDURE — 85025 COMPLETE CBC W/AUTO DIFF WBC: CPT

## 2020-07-17 PROCEDURE — 84100 ASSAY OF PHOSPHORUS: CPT

## 2020-07-17 PROCEDURE — 74011636637 HC RX REV CODE- 636/637: Performed by: INTERNAL MEDICINE

## 2020-07-17 PROCEDURE — 97116 GAIT TRAINING THERAPY: CPT

## 2020-07-17 PROCEDURE — 83735 ASSAY OF MAGNESIUM: CPT

## 2020-07-17 PROCEDURE — 74011250637 HC RX REV CODE- 250/637: Performed by: FAMILY MEDICINE

## 2020-07-17 PROCEDURE — 74011636637 HC RX REV CODE- 636/637: Performed by: FAMILY MEDICINE

## 2020-07-17 PROCEDURE — 97530 THERAPEUTIC ACTIVITIES: CPT

## 2020-07-17 PROCEDURE — 94660 CPAP INITIATION&MGMT: CPT

## 2020-07-17 RX ORDER — MAGNESIUM SULFATE HEPTAHYDRATE 40 MG/ML
2 INJECTION, SOLUTION INTRAVENOUS
Status: COMPLETED | OUTPATIENT
Start: 2020-07-17 | End: 2020-07-18

## 2020-07-17 RX ADMIN — Medication 500 MG: at 20:48

## 2020-07-17 RX ADMIN — PANTOPRAZOLE 40 MG: 40 TABLET, DELAYED RELEASE ORAL at 09:38

## 2020-07-17 RX ADMIN — MONTELUKAST 10 MG: 10 TABLET, FILM COATED ORAL at 20:48

## 2020-07-17 RX ADMIN — CHOLECALCIFEROL TAB 25 MCG (1000 UNIT) 2 TABLET: 25 TAB at 09:37

## 2020-07-17 RX ADMIN — INSULIN LISPRO 363 UNITS: 100 INJECTION, SOLUTION INTRAVENOUS; SUBCUTANEOUS at 20:53

## 2020-07-17 RX ADMIN — INSULIN LISPRO 3 UNITS: 100 INJECTION, SOLUTION INTRAVENOUS; SUBCUTANEOUS at 13:10

## 2020-07-17 RX ADMIN — INSULIN LISPRO 5 UNITS: 100 INJECTION, SOLUTION INTRAVENOUS; SUBCUTANEOUS at 16:44

## 2020-07-17 RX ADMIN — LISINOPRIL 20 MG: 20 TABLET ORAL at 09:38

## 2020-07-17 RX ADMIN — POLYETHYLENE GLYCOL 3350 17 G: 17 POWDER, FOR SOLUTION ORAL at 09:36

## 2020-07-17 RX ADMIN — PANTOPRAZOLE 40 MG: 40 TABLET, DELAYED RELEASE ORAL at 20:48

## 2020-07-17 RX ADMIN — LORATADINE 10 MG: 10 TABLET ORAL at 09:37

## 2020-07-17 RX ADMIN — BUDESONIDE AND FORMOTEROL FUMARATE DIHYDRATE 2 PUFF: 160; 4.5 AEROSOL RESPIRATORY (INHALATION) at 08:00

## 2020-07-17 RX ADMIN — ACETAMINOPHEN 500 MG: 500 TABLET ORAL at 20:53

## 2020-07-17 RX ADMIN — PREDNISONE 40 MG: 20 TABLET ORAL at 09:37

## 2020-07-17 RX ADMIN — Medication 500 MG: at 09:37

## 2020-07-17 RX ADMIN — MAGNESIUM SULFATE IN WATER 2 G: 40 INJECTION, SOLUTION INTRAVENOUS at 09:38

## 2020-07-17 RX ADMIN — INSULIN LISPRO 9 UNITS: 100 INJECTION, SOLUTION INTRAVENOUS; SUBCUTANEOUS at 16:44

## 2020-07-17 RX ADMIN — FLUTICASONE PROPIONATE 2 SPRAY: 50 SPRAY, METERED NASAL at 09:00

## 2020-07-17 RX ADMIN — INSULIN LISPRO 5 UNITS: 100 INJECTION, SOLUTION INTRAVENOUS; SUBCUTANEOUS at 13:10

## 2020-07-17 RX ADMIN — MAGNESIUM SULFATE IN WATER 2 G: 40 INJECTION, SOLUTION INTRAVENOUS at 05:21

## 2020-07-17 RX ADMIN — HYDROCHLOROTHIAZIDE 12.5 MG: 25 TABLET ORAL at 09:37

## 2020-07-17 RX ADMIN — ENOXAPARIN SODIUM 40 MG: 40 INJECTION SUBCUTANEOUS at 09:38

## 2020-07-17 RX ADMIN — INSULIN LISPRO 5 UNITS: 100 INJECTION, SOLUTION INTRAVENOUS; SUBCUTANEOUS at 09:39

## 2020-07-17 RX ADMIN — ZINC SULFATE 220 MG (50 MG) CAPSULE 1 CAPSULE: CAPSULE at 09:37

## 2020-07-17 RX ADMIN — BUDESONIDE AND FORMOTEROL FUMARATE DIHYDRATE 2 PUFF: 160; 4.5 AEROSOL RESPIRATORY (INHALATION) at 20:00

## 2020-07-17 RX ADMIN — INSULIN GLARGINE 45 UNITS: 100 INJECTION, SOLUTION SUBCUTANEOUS at 20:48

## 2020-07-17 RX ADMIN — BENZONATATE 100 MG: 100 CAPSULE ORAL at 20:53

## 2020-07-17 RX ADMIN — FUROSEMIDE 20 MG: 20 TABLET ORAL at 09:36

## 2020-07-17 RX ADMIN — ENOXAPARIN SODIUM 40 MG: 40 INJECTION SUBCUTANEOUS at 20:47

## 2020-07-17 NOTE — PROGRESS NOTES
Discharge planning    Reviewed chart. PT/OT recommending home health. Spoke with patient concerning discharge plan. The Plan for Transition of Care is related to the following treatment goals: Home with home health    The Patient and/patient representative, Maxx Sanchez was provided with a choice of provider and agrees   with the discharge plan. [x] Yes [] No    Freedom of choice list was provided with basic dialogue that supports the patient's individualized plan of care/goals, treatment preferences and shares the quality data associated with the providers. [x] Yes [] No  Freedom of choice obtained via telephone verbal consent due to isolation for [facility]. - Will need home health orders at discharge.     ANA M Smyth, RN  Pager # [phone]  Care Manager

## 2020-07-17 NOTE — PROGRESS NOTES
[facility]  Pulmonary, Critical Care, and Sleep Medicine    Pulmonary Medicine Progress Note    Name: Fuentes Luu MRN: [id number]  : 1960 Hospital: [facility]  Date: 2020       Subjective:  Pt improving every day. No acute issues. Tolerating diet, ambulating. On home NC. Patient Active Problem List   Diagnosis Code    Essential hypertension, benign I10    [facility] 2 [facility] E11.9    Esophageal reflux K21.9    SHERRIE on CPAP G47.33, Z99.89    COPD (chronic obstructive pulmonary disease) ([facility]) J44.9    Allergic rhinitis J30.9    COPD with acute exacerbation ([facility]) J44.1    Obesity, Class III, BMI 40-49.9 (morbid obesity) ([facility]) E66.01    Chest pain R07.9    Dyspnea R06.00    COPD exacerbation ([facility]) J44.1    Acute exacerbation of COPD with asthma ([facility]) J44.1, B67.993    Diastolic CHF, chronic ([facility]) I50.32    Diverticulosis K57.90    COPD with acute bronchitis ([facility]) J44.0, J20.9    Hyperlipidemia E78.5    Type 2 diabetes with nephropathy ([facility]) E11.21    Bilateral carotid bruits R09.89    Palpitations R00.2    Acute exacerbation of chronic obstructive pulmonary disease (COPD) ([facility]) J44.1    Atelectasis of right lung J98.11    Hypoxia R09.02    COVID-19 U07.1    Hypokalemia E87.6       Assessment:  · Acute on Chronic Hypoxic Respiratory Failure  · - 2/2 Covid19 Pneumonia  · Covid19 Pneumonia  · Severe Asthma/COPD  · - pt of [employer] [place]  · - currently on LAMA/LABA/ICS, chronic pred 5mg, azithromycin, awaiting approval for benralizumab  · SHERRIE on trilogy machine. Impression/Plan:   · Continue NC during the day, bipap at night  · nebulizers- brovana and pulmicort or mdi  · Continue PO prednisone- recommend 2 week taper on DC  · LE dopplers negative, on ppx lovenox  · s/p remdesivir  · NO ABX needed  · Inflammatory markers much improved    Okay for consideration of discharge this weekend, will f/u on  if still inpatient. FiO2 to keep SpO2 >=92%, HOB >=30 degree, aspiration precautions, aggressive pulmonary toileting, incentive spirometry. Other issues management by primary team and respective consultants. Events and notes from last 24 hours reviewed. Discussed with patient and family, answered all questions to their satisfaction. Care plan discussed with nursing.      Labs and images personally seen and available reports reviewed  All current medicines are reviewed       Medications- Current:  Current Facility-Administered Medications   Medication Dose Route Frequency    predniSONE (DELTASONE) tablet 40 mg  40 mg Oral DAILY    insulin lispro (HUMALOG) injection 5 Units  5 Units SubCUTAneous TIDAC    polyethylene glycol (MIRALAX) packet 17 g  17 g Oral DAILY    budesonide-formoteroL (SYMBICORT) 160-4.5 mcg/actuation HFA inhaler 2 Puff  2 Puff Inhalation BID RT    insulin glargine (LANTUS) injection 45 Units  45 Units SubCUTAneous QHS    enoxaparin (LOVENOX) injection 40 mg  40 mg SubCUTAneous Q12H    benzonatate (TESSALON) capsule 100 mg  100 mg Oral Q8H PRN    dextrose 10% infusion 125-250 mL  125-250 mL IntraVENous PRN    albuterol (PROVENTIL HFA, VENTOLIN HFA, PROAIR HFA) inhaler 2 Puff  2 Puff Inhalation Q4H PRN    sodium chloride (NS) flush 5-10 mL  5-10 mL IntraVENous PRN    insulin lispro (HUMALOG) injection   SubCUTAneous AC&HS    glucose chewable tablet 16 g  4 Tab Oral PRN    glucagon (GLUCAGEN) injection 1 mg  1 mg IntraMUSCular PRN    albuterol-ipratropium (DUO-NEB) 2.5 MG-0.5 MG/3 ML  3 mL Nebulization Q4H PRN    albuterol (ACCUNEB) nebulizer solution 1.25 mg  1.25 mg Nebulization Q4H PRN    acetaminophen (TYLENOL) tablet 500 mg  500 mg Oral Q6H PRN    Or    acetaminophen (TYLENOL) suppository 500 mg  500 mg Rectal Q6H PRN    ascorbic acid (vitamin C) (VITAMIN C) tablet 500 mg  500 mg Oral BID    cholecalciferol (VITAMIN D3) (1000 Units /25 mcg) tablet 2 Tab  2,000 Units Oral DAILY    zinc sulfate (ZINCATE) 220 (50) mg capsule 1 Cap  1 Cap Oral DAILY    furosemide (LASIX) tablet 20 mg  20 mg Oral DAILY    lisinopriL (PRINIVIL, ZESTRIL) tablet 20 mg  20 mg Oral DAILY    hydroCHLOROthiazide (HYDRODIURIL) tablet 12.5 mg  12.5 mg Oral DAILY    pantoprazole (PROTONIX) tablet 40 mg  40 mg Oral BID    loratadine (CLARITIN) tablet 10 mg  10 mg Oral DAILY    fluticasone propionate (FLONASE) 50 mcg/actuation nasal spray 2 Spray  2 Spray Both Nostrils DAILY    montelukast (SINGULAIR) tablet 10 mg  10 mg Oral QHS       Objective:  Vital Signs:    Visit Vitals  /59 (BP 1 Location: Left arm, BP Patient Position: Sitting)   Pulse 62   Temp 98.3 °F (36.8 °C)   Resp 25   Ht 5' 3\" (1.6 m) Comment: previously documented   Wt 121.5 kg (267 lb 12.8 oz)   SpO2 92%   BMI 47.44 kg/m²      O2 Device: BIPAP  O2 Flow Rate (L/min): 4 l/min  Temp (24hrs), Av.9 °F (36.6 °C), Min:97.4 °F (36.3 °C), Max:98.7 °F (37.1 °C)      Intake/Output:   Last shift:      No intake/output data recorded. Last 3 shifts: No intake/output data recorded. No intake or output data in the 24 hours ending 20 1437    Physical Exam:     General/Neurology: Alert, Awake, on NC  Head:   Normocephalic, without obvious abnormality  Eye:   PERRL, EOM intact  Oral:  Mucus membranes moist  Lung:   B/l air entry fair. No rales. No rhonchi. No wheezing, Decreased BS  Heart:   S1 S2 present  Abdomen:  Soft, non tender, BS+nt   Extremities:  No pedal edema.    Skin:   Dry, intact  Data:      Recent Results (from the past 24 hour(s))   GLUCOSE, POC    Collection Time: 20  5:22 PM   Result Value Ref Range    Glucose (POC) 302 (H) 70 - 110 mg/dL   GLUCOSE, POC    Collection Time: 20  8:19 PM   Result Value Ref Range    Glucose (POC) 328 (H) 70 - 110 mg/dL   C REACTIVE PROTEIN, QT    Collection Time: 20  2:41 AM   Result Value Ref Range    C-Reactive protein 0.7 (H) 0 - 0.3 mg/dL   CBC WITH AUTOMATED DIFF    Collection Time: 07/17/20  2:41 AM   Result Value Ref Range    WBC 10.2 4.6 - 13.2 K/uL    RBC 3.89 (L) 4.20 - 5.30 M/uL    HGB 12.0 12.0 - 16.0 g/dL    HCT 34.8 (L) 35.0 - 45.0 %    MCV 89.5 74.0 - 97.0 FL    MCH 30.8 24.0 - 34.0 PG    MCHC 34.5 31.0 - 37.0 g/dL    RDW 13.4 11.6 - 14.5 %    PLATELET 703 396 - 875 K/uL    MPV 9.1 (L) 9.2 - 11.8 FL    NEUTROPHILS 74 42 - 75 %    BAND NEUTROPHILS 3 0 - 5 %    LYMPHOCYTES 9 (L) 20 - 51 %    MONOCYTES 14 (H) 2 - 9 %    EOSINOPHILS 0 0 - 5 %    BASOPHILS 0 0 - 3 %    NRBC 2.0 (H) 0  WBC    ABS. NEUTROPHILS 7.9 1.8 - 8.0 K/UL    ABS. LYMPHOCYTES 0.9 0.8 - 3.5 K/UL    ABS. MONOCYTES 1.4 (H) 0 - 1.0 K/UL    ABS. EOSINOPHILS 0.0 0.0 - 0.4 K/UL    ABS.  BASOPHILS 0.0 0.0 - 0.06 K/UL    DF AUTOMATED      PLATELET COMMENTS ADEQUATE PLATELETS      RBC COMMENTS ANISOCYTOSIS  1+       MAGNESIUM    Collection Time: 07/17/20  2:41 AM   Result Value Ref Range    Magnesium 0.8 (LL) 1.6 - 2.6 mg/dL   PHOSPHORUS    Collection Time: 07/17/20  2:41 AM   Result Value Ref Range    Phosphorus 0.7 (L) 2.5 - 4.9 MG/DL   GLUCOSE, POC    Collection Time: 07/17/20  8:12 AM   Result Value Ref Range    Glucose (POC) 139 (H) 70 - 110 mg/dL   GLUCOSE, POC    Collection Time: 07/17/20 12:05 PM   Result Value Ref Range    Glucose (POC) 176 (H) 70 - 110 mg/dL         Chemistry   Recent Labs     07/17/20  0241 07/16/20  0330 07/15/20  0535   GLU  --  264* 274*   NA  --  139 137   K  --  4.6 3.8   CL  --  96* 97*   CO2  --  36* 35*   BUN  --  23* 26*   CREA  --  0.94 0.92   CA  --  9.1 9.4   MG 0.8*  --   --    PHOS 0.7*  --   --    AGAP  --  7 5   BUCR  --  24* 28*   AP  --   --  75   TP  --   --  6.8   ALB  --   --  2.9*   GLOB  --   --  3.9   AGRAT  --   --  0.7*       CBC w/Diff   Recent Labs     07/17/20  0241 07/16/20  0330 07/15/20  0535   WBC 10.2 10.9 10.0   RBC 3.89* 3.95* 3.96*   HGB 12.0 12.1 12.3   HCT 34.8* 35.7 35.7    398 414   GRANS 74 87* 75   LYMPH 9* 5* 16*   EOS 0 0 0       ABG No results for input(s): PHI, PHI, POC2, PCO2I, PO2, PO2I, HCO3, HCO3I, FIO2, FIO2I in the last 72 hours. Micro  No results for input(s): SDES, CULT in the last 72 hours. No results for input(s): CULT in the last 72 hours. CT (Most Recent)   Results from Hospital Encounter encounter on 04/08/20   CT ABD PELV WO CONT    Narrative CT ABDOMEN AND PELVIS WITHOUT ENHANCEMENT    INDICATION: Nausea, right lower quadrant pain, vomiting since this morning. TECHNIQUE: Axial images obtained of the abdomen and pelvis without intravenous  contrast. Coronal and sagittal reformatted images of the abdomen and pelvis were  obtained. All CT scans at this facility are performed using dose optimization technique as  appropriate to a performed exam, to include automated exposure control,  adjustment of the mA and/or kV according to patient size (including appropriate  matching first site-specific examinations), or use of iterative reconstruction  technique. COMPARISON: 7/14/2019. Lack of intravenous contrast renders this study suboptimal for evaluating the  solid abdominal organs, vasculature and bowel. ABDOMEN FINDINGS:     Liver: Liver is 22.5 cm craniocaudal with mild decreased density. Spleen: Unremarkable. Pancreas: Unremarkable. Biliary: Cholecystectomy. Bowel: Mild diverticulosis. Similar small bowel in a periumbilical hernia. Peritoneum/ Retroperitoneum: Unremarkable. Lymph Nodes: Unremarkable. Adrenal Glands: Unremarkable. Kidneys: Unremarkable. Vessels: Moderate atherosclerosis. Multifocal stenosis of the visceral ostia. PELVIS FINDINGS:     Bladder/ Pelvic Organs: Unremarkable. Lung Base: Severe coronary arteriosclerosis left coronary artery and LAD. Centrilobular emphysema. Subsegmental atelectasis at the lung bases. Bones/Soft tissues: Degenerative changes bilateral hips. Degenerative disc  disease.         Impression IMPRESSION:    Unchanged small bowel containing periumbilical hernia without associated  obstruction. Hepatomegaly and mild steatosis. Mild diverticulosis. Moderate atherosclerosis with multifocal visceral ostia stenosis. Severe left coronary arteriosclerosis. Emphysema. XR (Most Recent). CXR reviewed by me and compared with previous CXR   Results from Hospital Encounter encounter on 07/09/20   XR CHEST PORT    Narrative Portable CXR:    HISTORY: Respiratory distress. Covid 19    Comparison July 11, 2020 1706 hours    Stable cardiomegaly. No vascular congestion. Mild density in the lung bases,  grossly unchanged. Small pleural effusion. Impression IMPRESSION: No acute change with bilateral basilar infiltrate/pneumonia. No CHF. See my orders for details     Total care time exclusive of procedures with complex decision making, coordination of care and counseling patient performed and > 50% time spent in face to face evaluation as mentioned above.     Karely Lawson MD  Critical Care Medicine

## 2020-07-17 NOTE — PROGRESS NOTES
Patient is not available to be assessed at this time. No family at bedside.     El Velasquez   Board Certified [address] [facility][note 3]   [phone]

## 2020-07-17 NOTE — DISCHARGE SUMMARY
[facility]  Discharge Summary    Patient: Jaqueline Alvarez MRN: [id number]  CSN: [id number]    YOB: 1960  Age: 60 y.o. Sex: female      Admission Date: 7/9/2020 Discharge Date: 7/18/20   Attending: Shashank Morejon MD PCP: Meliton Butterfield MD     ===================================================================    Reason for Admission: COVID-19 [U07.1]  Hypoxia [R09.02]  COVID-19 [U07.1]  Hypokalemia [E87.6]    Discharge Diagnoses:   COVID-19  Hypoxia  Acute on Chronic Hypoxic Respiratory Failure    Important notes to PCP/ follow-up studies and evaluations   Please monitor her steroid taper  Please follow up her respiratory status, as she has multiple respiratory pathologies. Please follow up her insulin regimen, as her steroids made managing her sugar inpatient very difficult. Pending labs and studies:  none    Operative Procedures:   None    Discharge Medications:     Discharge Medication List as of 7/18/2020  5:06 PM      CONTINUE these medications which have CHANGED    Details   predniSONE (DELTASONE) 20 mg tablet Take 40 mg by mouth daily (with breakfast) for 7 days, THEN 20 mg daily (with breakfast) for 7 days. , Print, Disp-21 Tab,R-0         CONTINUE these medications which have NOT CHANGED    Details   fluticasone propionate (Flovent HFA) 220 mcg/actuation inhaler Take 1 Puff by inhalation two (2) times a day., Print, Disp-1 Inhaler, R-0      albuterol-ipratropium (DUO-NEB) 2.5 mg-0.5 mg/3 ml nebu 3 mL by Nebulization route every four (4) hours as needed for Wheezing., Print, Disp-30 Nebule, R-0      albuterol (PROVENTIL HFA, VENTOLIN HFA, PROAIR HFA) 90 mcg/actuation inhaler Take 2 Puffs by inhalation every four (4) hours as needed for Wheezing., Print, Disp-1 Inhaler, R-0      omeprazole (PRILOSEC) 40 mg capsule Take  by mouth., Historical Med      furosemide (Lasix) 20 mg tablet Take 20 mg by mouth daily. , Historical Med      simethicone (GAS-X) 125 mg capsule Take 125 mg by mouth four (4) times daily as needed for Flatulence., Historical Med      loratadine (CLARITIN) 10 mg tablet Take 10 mg by mouth daily as needed for Allergies. , Historical Med      lisinopril (PRINIVIL, ZESTRIL) 20 mg tablet Take 20 mg by mouth two (2) times a day., Historical Med      fluticasone propion-salmeterol (ADVAIR HFA) 230-21 mcg/actuation inhaler Take 2 Puffs by inhalation two (2) times a day., Historical Med      tiotropium bromide (SPIRIVA RESPIMAT) 2.5 mcg/actuation inhaler Take 2 Puffs by inhalation daily. , Historical Med      NOVOLOG FLEXPEN U-100 INSULIN 100 unit/mL inpn Continue home Sliding scale insulin as prior to admission, Print, Disp-1 Pen, R-0, ERLINDA      OXYGEN-AIR DELIVERY SYSTEMS 2 L by Nasal route continuous. First Choice, Historical Med      aspirin delayed-release 81 mg tablet Take 81 mg by mouth daily. , Historical Med      montelukast (SINGULAIR) 10 mg tablet Take 10 mg by mouth nightly., Historical Med      azithromycin (ZITHROMAX) 250 mg tablet Take 1 Tab by mouth every Monday, Wednesday, Friday. Monday-Friday., No Print, Disp-30 Tab, R-0      insulin glargine (Basaglar KwikPen U-100 Insulin) 100 unit/mL (3 mL) inpn 45 Units by SubCUTAneous route nightly., Historical Med      lactobacillus sp. 50 billion cpu (BIO-K PLUS) 50 billion cell -375 mg cap capsule Take 1 Cap by mouth daily. , Print, Disp-30 Cap, R-0      hydroCHLOROthiazide (HYDRODIURIL) 12.5 mg tablet Take 12.5 mg by mouth daily. , Historical Med         STOP taking these medications       benzonatate (Tessalon Perles) 100 mg capsule Comments:   Reason for Stopping:         dexAMETHasone (Decadron) 6 mg tablet Comments:   Reason for Stopping:         pantoprazole (PROTONIX) 40 mg tablet Comments:   Reason for Stopping:         fluticasone propionate (FLONASE ALLERGY RELIEF) 50 mcg/actuation nasal spray Comments:   Reason for Stopping:               Disposition: Home with Home Health    Consultants:    [address] [place] [address] Course (including pertinent history and physical findings)  Zaida Morgan is a 60 y.o. female with PMH severe COPD and asthma with frequent hospital admissions for exacerbations, DM II, hypertension, sleep apnea, COPD, who arrived with complaints of diarrhea, myalgias, fevers, headache and difficulty breathing not like COPD exacerbation. She complained of being tired and not sleeping. She was admitted for increased SOB in light of COVID 19 disease. She was found to have a positive COVID swab on 7/6 and 7/7. She was given respiratory support, with associated labs taken daily. She was also started on a course of Remdesivir, and given an infusion of convalescent plasma. For pulmonary protection she was started on steroids, and for her insulin she was controlled with Lantus and SSI primarily. Over the course of her hospital visit, she was weaned down on her steroid dose, and her supportive care was slowly weaned back to home dose of oxygen. Her insulin was adjusted due to some higher glucose readings, likely secondary to the steroid use. Her lab values were carefully followed and she improved over her hospital stay, with lessening oxygen demand until she was at her baseline of 3L o2. Her glucose came under more control through her stay as well as her steroids were lowered. She felt well enough to be discharged, and her clinical and laboratory status assured this, and was thus discharged home. CURRENT ADMISSION IMAGING RESULTS   Xr Chest Port    Result Date: 7/12/2020  IMPRESSION: No change in bilateral basilar infiltrate/pneumonia. No CHF. Xr Chest Port    Result Date: 7/12/2020  IMPRESSION: No acute change with bilateral basilar infiltrate/pneumonia. No CHF. Xr Chest Port    Result Date: 7/10/2020  IMPRESSION: Slight improvement in basilar aeration.     Xr Chest Port    Result Date: 7/9/2020  IMPRESSION: Decreased inspiration since the prior day, otherwise no change        Cardiology Procedures/Testing:  MODALITY RESULTS   EKG Results for orders placed or performed during the hospital encounter of 07/09/20   EKG, 12 LEAD, INITIAL   Result Value Ref Range    Ventricular Rate 86 BPM    Atrial Rate 86 BPM    P-R Interval 136 ms    QRS Duration 86 ms    Q-T Interval 354 ms    QTC Calculation (Bezet) 423 ms    Calculated P Axis 35 degrees    Calculated R Axis 32 degrees    Calculated T Axis 38 degrees    Diagnosis       Normal sinus rhythm  Normal ECG  When compared with ECG of 07-JUL-2020 16:09,  No significant change was found  Confirmed by Dennie Gift, MD, Narcisa Carvalho ([id number]) on 7/10/2020 1:06:31 PM         ECHO 05/21/20   ECHO ADULT COMPLETE 05/25/2020 5/25/2020    Narrative · Image quality for this study was technically difficult. Contrast used:   DEFINITY. · Normal cavity size and systolic function (ejection fraction normal). Moderate concentric hypertrophy. Estimated left ventricular ejection   fraction is 65 - 70%. Visually measured ejection fraction. No regional   wall motion abnormality noted. Moderate (grade 2) left ventricular   diastolic dysfunction. · Mildly dilated left atrium. · Pulmonary hypertension. Pulmonary arterial systolic pressure is 61 mmHg. · Severely elevated central venous pressure (15+ mmHg); IVC diameter is   larger than 21 mm and collapses less than 50% with respiration. Signed by: Laly Santos MD      Nuclear Medicine [place] [facility] [place]   NM LUNG PERFUSION W VENT    Impression IMPRESSION:    Low probability of pulmonary embolism. Results from [place] encounter on 09/04/13   TRANSCRIBED NUCLEAR MEDICINE   Results from Hospital Encounter encounter on 12/06/12   NM LUNG VENT/PERF    Impression IMPRESSION:    Very low probability for pulmonary embolus. IR No results found for this or any previous visit.    CATH       Special Testing/Procedures:  MODALITY RESULTS MICRO All Micro Results     Procedure Component Value Units Date/Time    CULTURE, BLOOD [[id number]] Collected:  07/09/20 1553    Order Status:  Completed Specimen:  Blood Updated:  07/15/20 0712     Special Requests: NO SPECIAL REQUESTS        Culture result: NO GROWTH 6 DAYS       CULTURE, BLOOD [[id number]] Collected:  07/09/20 1543    Order Status:  Completed Specimen:  Blood Updated:  07/15/20 0712     Special Requests: NO SPECIAL REQUESTS        Culture result: NO GROWTH 6 DAYS       CULTURE, URINE [[id number]] Collected:  07/09/20 1916    Order Status:  Completed Specimen:  Urine from Clean catch Updated:  07/11/20 0925     Special Requests: NO SPECIAL REQUESTS        Culture result:       No significant growth, <10,000 CFU/mL               ABG Lab Results   Component Value Date/Time    pH (POC) 7.36 07/09/2020 11:21 PM    pCO2 (POC) 43.2 07/09/2020 11:21 PM    pO2 (POC) 209 (H) 07/09/2020 11:21 PM    HCO3 (POC) 24.1 07/09/2020 11:21 PM    FIO2 (POC) 40 05/21/2020 11:00 PM      UA No results found for this or any previous visit.    ENDO [unfilled]   @LIZ@    PATH none     Laboratory Results:  LABORATORY RESULTS   HEMATOLOGY Lab Results   Component Value Date/Time    WBC 9.8 07/18/2020 05:20 AM    HGB 12.2 07/18/2020 05:20 AM    HCT 37.1 07/18/2020 05:20 AM    PLATELET 080 22/43/7818 05:20 AM    MCV 90.9 07/18/2020 05:20 AM       CHEMISTRIES Lab Results   Component Value Date/Time    Sodium 138 07/18/2020 05:20 AM    Potassium 4.0 07/18/2020 05:20 AM    Chloride 99 (L) 07/18/2020 05:20 AM    CO2 37 (H) 07/18/2020 05:20 AM    Anion gap 2 (L) 07/18/2020 05:20 AM    Glucose 150 (H) 07/18/2020 05:20 AM    BUN 25 (H) 07/18/2020 05:20 AM    Creatinine 0.95 07/18/2020 05:20 AM    BUN/Creatinine ratio 26 (H) 07/18/2020 05:20 AM    GFR est AA >60 07/18/2020 05:20 AM    GFR est non-AA >60 07/18/2020 05:20 AM    Calcium 9.3 07/18/2020 05:20 AM      HEPATIC FUNCTION Lab Results   Component Value Date/Time    Albumin 2.9 (L) 07/15/2020 05:35 AM    Bilirubin, direct <0.1 02/08/2017 10:00 PM    Bilirubin, total 0.5 07/15/2020 05:35 AM    Protein, total 6.8 07/15/2020 05:35 AM    Globulin 3.9 07/15/2020 05:35 AM    A-G Ratio 0.7 (L) 07/15/2020 05:35 AM    ALT (SGPT) 39 07/15/2020 05:35 AM    Alk. phosphatase 75 07/15/2020 05:35 AM       LACTIC ACID Lab Results   Component Value Date/Time    Lactic acid 1.2 07/09/2020 03:53 PM    Lactic acid 2.2 (HH) 05/26/2020 05:39 AM    Lactic acid 3.4 (HH) 05/26/2020 12:45 AM      CARDIAC PANEL Lab Results   Component Value Date/Time    CK 48 07/07/2020 04:15 PM    CK - MB <1.0 07/07/2020 04:15 PM    CK-MB Index  07/07/2020 04:15 PM     CALCULATION NOT PERFORMED WHEN RESULT IS BELOW LINEAR LIMIT    Troponin-I, QT <0.02 07/09/2020 03:53 PM      NT-proBNP Lab Results   Component Value Date/Time    NT pro- 07/09/2020 03:53 PM    NT pro- 07/07/2020 04:15 PM    NT pro-BNP 95 06/03/2020 11:05 PM    NT pro-BNP 64 05/22/2020 04:08 PM    NT pro-BNP 34 05/13/2020 05:46 PM      THYROID Lab Results   Component Value Date/Time    TSH 2.76 09/18/2016 02:20 PM      LIPID PANEL Lab Results   Component Value Date/Time    Cholesterol, total 220 (H) 11/20/2012 03:22 AM    HDL Cholesterol 62 (H) 11/20/2012 03:22 AM    LDL, calculated 144.6 (H) 11/20/2012 03:22 AM    VLDL, calculated 13.4 11/20/2012 03:22 AM    Triglyceride 67 11/20/2012 03:22 AM    CHOL/HDL Ratio 3.5 11/20/2012 03:22 AM         RISK CALCULATORS:  SCORE RESULT   ASCVD The ASCVD Risk score (Naomi El., et al., 2013) failed to calculate for the following reasons:    ASCVD risk score not calculated    [id number]     HAS-BLED    READMISSION RISK SCORE High Risk            26       Total Score        3 Has Seen PCP in Last 6 Months (Yes=3, No=0)    3 Patient Length of Stay (>5 days = 3)    11 IP Visits Last 12 Months (1-3=4, 4=9, >4=11)    9 Pt.  Coverage (Medicare=5 , Medicaid, or Self-Pay=4)        Criteria that do not apply: . Living with Significant Other. Assisted Living. LTAC. SNF.  or   Rehab    Charlson Comorbidity Score (Age + Comorbid Conditions)           Functional status and cognitive function:    Ambulates with: Walker  Status: alert, cooperative, no distress, appears stated age  Condition: STABLE  Disposition: Home Health    Diet: General Diet    Code status and advanced care plan: Full    Point of Contact Ross Julien  Relationship: Daughter  [phone]     Patient Education:  Patient was educated on the following topics prior to discharge:COVID-19    Follow-up:   Follow-up Information     Follow up With Specialties Details Why Contact Info    Danilo Myers, DO Internal Medicine Go on 7/23/2020 At 1:20 PM [place] [phone] [facility] Pulmonary Disease, Internal Medicine Schedule an appointment as soon as possible for a visit in 3 weeks for hospital [facility] [address], [address] [place] [address], [address] [place] [address] Drive [place] [phone]      [facility]   chosen to continue managing healthcare needs, your preferred agency Address [address] [place] [address]        Phone [phone]              =================================================================  Gypsy Burkitt, MD PGY-1   Kelli Chapman   Intern Pager: [phone]   July 18, 2020 , 4:52 PM

## 2020-07-17 NOTE — PROGRESS NOTES
Problem: Mobility Impaired (Adult and Pediatric)  Goal: *Acute Goals and Plan of Care (Insert Text)  Description: Physical Therapy Goals  Initiated 7/15/2020 and to be accomplished within 7 day(s)  1. Patient will transfer from bed to chair and chair to bed with modified independence using the least restrictive device. 2.  Patient will perform sit to stand with modified independence. 3.  Patient will ambulate with modified independence for 20 feet with the least restrictive device. 4.  To assess for stairs as needed    Prior Level of Function:   Patient was modified independence for all mobility including gait using walker sometimes, other times without AD. Patient lives with daughter and two grandchildren in a two-story home but bedroom and [facility] are downstairs. No full bathroom is downstairs. Has a personal care aide that comes and helps with meals. Daughter and grandchildren work. Outcome: Progressing Towards Goal    PHYSICAL THERAPY TREATMENT    Patient: Annabel Cheadle (86 y.o. female)  Date: 7/17/2020  Diagnosis: COVID-19 [U07.1]  Hypoxia [R09.02]  COVID-19 [U07.1]  Hypokalemia [E87.6]   COVID-19       Precautions: Fall, Contact, Other (comment)(droplet plus)      ASSESSMENT:  Patient received sitting EOB agreeable to PT session. Patient is on 4L NC SPO2 90-92%. Patient performs BLE there-ex with guidance of therapist to increase strength and maintain joint integrity. She performs sit to stand transfer at stand by level of assistance. Static standing with RW and good balance, tolerance improved to 2 minutes before needing rest break due to SOB. She c/o dizziness in standing, /70. Reviewed pursed lip breathing and states she is aware as she has had COPD for several years. Patient with limited ambulation about 6 ft using RW with CGA due to dizziness/ lightheadedness. SPO2 decreased to 87% with mobility. BP in sitting 124/62. Patient recovers back to low 90's.  At end of session, patient left sitting EOB with all needs addressed. Progression toward goals:   []      Improving appropriately and progressing toward goals  [x]      Improving slowly and progressing toward goals  []      Not making progress toward goals and plan of care will be adjusted     PLAN:  Patient continues to benefit from skilled intervention to address the above impairments. Continue treatment per established plan of care. Discharge Recommendations:   home health wit family support and care aid  Further Equipment Recommendations for Discharge:  bedside commode, shower chair, and rolling walker     SUBJECTIVE:   Patient stated I have been in and out of the hospital this year.     OBJECTIVE DATA SUMMARY:   Critical Behavior:  Neurologic State: Alert  Orientation Level: Oriented X4  Cognition: Follows commands  Safety/Judgement: Fall prevention  Functional Mobility Training:    Transfers:  Sit to Stand: Stand-by assistance  Stand to Sit: Stand-by assistance       Balance:  Sitting: Intact; Without support  Standing: Impaired; With support  Standing - Static: Good  Standing - Dynamic : Fair     Ambulation/Gait Training:  Distance (ft): 6 Feet (ft)  Assistive Device: Walker, rolling  Ambulation - Level of Assistance: Contact guard assistance  Gait Abnormalities: Decreased step clearance  Speed/Pam: Slow  Step Length: Right shortened;Left shortened     Therapeutic Exercises:         EXERCISE   Sets   Reps   Active Active Assist   Passive Self ROM   Comments   Ankle Pumps 1 20  [x] [] [] []    Quad Sets/Glut Sets    [] [] [] [] Hold for 5 secs   Hamstring iso 1 10 [x] [] [] [] 3 second hold   Short Arc Quads   [] [] [] []    Heel Slides   [] [] [] []    Straight Leg Raises   [] [] [] []    Hip Add/hip abduction iso 1 10 [x] [] [] [] Hold for 5 secs, w/ pillow squeeze   Long Arc Quads 1 10 [x] [] [] [] 3 second hold   Seated Marching 1 10 [x] [] [] []    Standing Marching   [] [] [] []       [] [] [] []        Pain:  Pain level pre-treatment: 0/10  Pain level post-treatment: 0/10   Pain Intervention(s): Medication (see MAR); Rest, Ice, Repositioning   Response to intervention: Nurse notified, See doc flow    Activity Tolerance:   Fair  Please refer to the flowsheet for vital signs taken during this treatment. After treatment:   [x] Patient left in no apparent distress sitting up EOB  [] Patient left in no apparent distress in bed  [x] Call bell left within reach  [x] Nursing notified  [] Caregiver present  [] Bed alarm activated  [] SCDs applied      COMMUNICATION/EDUCATION:   []         Role of Physical Therapy in the acute care setting. [x]         Fall prevention education was provided and the patient/caregiver indicated understanding. [x]         Patient/family have participated as able in working toward goals and plan of care. [x]         Patient/family agree to work toward stated goals and plan of care. []         Patient understands intent and goals of therapy, but is neutral about his/her participation.   []         Patient is unable to participate in stated goals/plan of care: ongoing with therapy staff.  []         Other:        German Gastelum, PT   Time Calculation: 30 mins

## 2020-07-17 NOTE — ROUTINE PROCESS
Verbal shift change report given to AllisonRN (oncoming nurse) by Ashley Fairchidl (offgoing nurse). Report included the following information SBAR, Kardex, Intake/Output, MAR, Recent Results and Alarm Parameters .

## 2020-07-17 NOTE — ROUTINE PROCESS
Received verbal report from [place], report included SBAR, MAR, and Kardex. Gave verbal report to Chantal Salvador RN, report included SBAR, MAR, and Kardex.

## 2020-07-17 NOTE — DIABETES MGMT
GLYCEMIC CONTROL PLAN OF CARE     Assessment/Recommendations:  Blood glucose this am 139 mg/dl  Improved from yesterday. Continue current insulin regimen as ordered  Will continue inpatient monitoring. Most recent blood glucose values:       Results for Kerry Martínez (MRN [id number]) as of 7/17/2020 14:54   Ref. Range 7/16/2020 11:43 7/16/2020 17:22 7/16/2020 20:19 7/17/2020 08:12 7/17/2020 12:05   GLUCOSE,FAST - POC Latest Ref Range: 70 - 110 mg/dL 279 (H) 302 (H) 328 (H) 139 (H) 176 (H)     Current A1C of 8.4 % is equivalent to average blood glucose of 194 mg/dl over the past 2-3 months. Current hospital diabetes medications:   Lantus 45 units every bedtime  Lantus 5 units 3 times daily with meals.   Lispro corrective insulin coverage AC&Hs  Previous day's insulin requirements:   Lantus 45 units  Lispro 5 units mealtime insulin  Lispro 45 units corrective insulin coverage  Home diabetes medications:  per 5/2020 note  Basaglar 35 units daily  Novolog tid with meals based on BG readings  Diet:   DIET DIABETIC WITH OPTIONS Consistent Carb 1500-1600kcal; Regular; 2 GM NA (House Low NA); AHA-LOW-CHOL FAT    Education:  ____Refer to Diabetes Education Record             __x_Education not indicated at this time      Efren Ortiz, [facility] CDE  Ext [phone]

## 2020-07-17 NOTE — PROGRESS NOTES
[facility]  Progress Note    Patient: Dawood Garduno MRN: [id number]   SSN: [id number]  YOB: 1960   Age: 60 y.o. Sex: female      Admit Date: 7/9/2020    LOS: 8 days   Chief Complaint   Patient presents with    Concern For COVID-19 (Coronavirus)    Shortness of Breath       Subjective:     Per nurse, patient was well overnight. Attempted to call patient, but did not get answer. continuing to stay on 3 lpm, sats at 92-95%. VSS, and patient seems well overall. Essentially unchanged from yesterday. Will defer full exam and Physical due to COVID     ROS as in subjective    Objective:     Visit Vitals  /64   Pulse (!) 52   Temp 97.4 °F (36.3 °C)   Resp 22   Ht 5' 3\" (1.6 m) Comment: previously documented   Wt 121.5 kg (267 lb 12.8 oz)   SpO2 96%   BMI 47.44 kg/m²       Physical Exam:   Deferred     Intake and Output:  Current Shift: No intake/output data recorded. Last three shifts: No intake/output data recorded. Lab/Data Review:  Recent Results (from the past 12 hour(s))   GLUCOSE, POC    Collection Time: 07/16/20  8:19 PM   Result Value Ref Range    Glucose (POC) 328 (H) 70 - 110 mg/dL   C REACTIVE PROTEIN, QT    Collection Time: 07/17/20  2:41 AM   Result Value Ref Range    C-Reactive protein 0.7 (H) 0 - 0.3 mg/dL   CBC WITH AUTOMATED DIFF    Collection Time: 07/17/20  2:41 AM   Result Value Ref Range    WBC 10.2 4.6 - 13.2 K/uL    RBC 3.89 (L) 4.20 - 5.30 M/uL    HGB 12.0 12.0 - 16.0 g/dL    HCT 34.8 (L) 35.0 - 45.0 %    MCV 89.5 74.0 - 97.0 FL    MCH 30.8 24.0 - 34.0 PG    MCHC 34.5 31.0 - 37.0 g/dL    RDW 13.4 11.6 - 14.5 %    PLATELET 066 877 - 011 K/uL    MPV 9.1 (L) 9.2 - 11.8 FL    NEUTROPHILS 74 42 - 75 %    BAND NEUTROPHILS 3 0 - 5 %    LYMPHOCYTES 9 (L) 20 - 51 %    MONOCYTES 14 (H) 2 - 9 %    EOSINOPHILS 0 0 - 5 %    BASOPHILS 0 0 - 3 %    NRBC 2.0 (H) 0  WBC    ABS. NEUTROPHILS 7.9 1.8 - 8.0 K/UL    ABS.  LYMPHOCYTES 0.9 0.8 - 3.5 K/UL ABS. MONOCYTES 1.4 (H) 0 - 1.0 K/UL    ABS. EOSINOPHILS 0.0 0.0 - 0.4 K/UL    ABS. BASOPHILS 0.0 0.0 - 0.06 K/UL    DF AUTOMATED      PLATELET COMMENTS ADEQUATE PLATELETS      RBC COMMENTS ANISOCYTOSIS  1+       MAGNESIUM    Collection Time: 07/17/20  2:41 AM   Result Value Ref Range    Magnesium 0.8 (LL) 1.6 - 2.6 mg/dL   PHOSPHORUS    Collection Time: 07/17/20  2:41 AM   Result Value Ref Range    Phosphorus 0.7 (L) 2.5 - 4.9 MG/DL       RECENT RESULTS  MODALITY IMPRESSION   XR Results from [place] encounter on 07/09/20   XR CHEST PORT    Narrative Portable CXR:    HISTORY: Respiratory distress. Covid 19    Comparison July 11, 2020 1706 hours    Stable cardiomegaly. No vascular congestion. Mild density in the lung bases,  grossly unchanged. Small pleural effusion. Impression IMPRESSION: No acute change with bilateral basilar infiltrate/pneumonia. No CHF. CT Results from [place] encounter on 04/08/20   CT ABD PELV WO CONT    Narrative CT ABDOMEN AND PELVIS WITHOUT ENHANCEMENT    INDICATION: Nausea, right lower quadrant pain, vomiting since this morning. TECHNIQUE: Axial images obtained of the abdomen and pelvis without intravenous  contrast. Coronal and sagittal reformatted images of the abdomen and pelvis were  obtained. All CT scans at this facility are performed using dose optimization technique as  appropriate to a performed exam, to include automated exposure control,  adjustment of the mA and/or kV according to patient size (including appropriate  matching first site-specific examinations), or use of iterative reconstruction  technique. COMPARISON: 7/14/2019. Lack of intravenous contrast renders this study suboptimal for evaluating the  solid abdominal organs, vasculature and bowel. ABDOMEN FINDINGS:     Liver: Liver is 22.5 cm craniocaudal with mild decreased density. Spleen: Unremarkable. Pancreas: Unremarkable. Biliary: Cholecystectomy.   Bowel: Mild diverticulosis. Similar small bowel in a periumbilical hernia. Peritoneum/ Retroperitoneum: Unremarkable. Lymph Nodes: Unremarkable. Adrenal Glands: Unremarkable. Kidneys: Unremarkable. Vessels: Moderate atherosclerosis. Multifocal stenosis of the visceral ostia. PELVIS FINDINGS:     Bladder/ Pelvic Organs: Unremarkable. Lung Base: Severe coronary arteriosclerosis left coronary artery and LAD. Centrilobular emphysema. Subsegmental atelectasis at the lung bases. Bones/Soft tissues: Degenerative changes bilateral hips. Degenerative disc  disease. Impression IMPRESSION:    Unchanged small bowel containing periumbilical hernia without associated  obstruction. Hepatomegaly and mild steatosis. Mild diverticulosis. Moderate atherosclerosis with multifocal visceral ostia stenosis. Severe left coronary arteriosclerosis. Emphysema. MRI No results found for this or any previous visit. ULTRASOUND Results from [place] encounter on 04/10/17   US ABD COMP    Impression IMPRESSION:       1. Echogenic mildly enlarged liver, suggestive of hepatic steatosis. No focal  hepatic lesion identified. 2. Status post cholecystectomy. No biliary ductal dilatation.         Cardiology Procedures/Testing:  MODALITY RESULTS   EKG Results for orders placed or performed during the hospital encounter of 07/09/20   EKG, 12 LEAD, INITIAL   Result Value Ref Range    Ventricular Rate 86 BPM    Atrial Rate 86 BPM    P-R Interval 136 ms    QRS Duration 86 ms    Q-T Interval 354 ms    QTC Calculation (Bezet) 423 ms    Calculated P Axis 35 degrees    Calculated R Axis 32 degrees    Calculated T Axis 38 degrees    Diagnosis       Normal sinus rhythm  Normal ECG  When compared with ECG of 07-JUL-2020 16:09,  No significant change was found  Confirmed by Luis Carlson MD, Nellie Lopez ([id number]) on 7/10/2020 1:06:31 PM         ECHO 05/21/20   ECHO ADULT COMPLETE 05/25/2020 5/25/2020    Narrative · Image quality for this study was technically difficult. Contrast used:   DEFINITY. · Normal cavity size and systolic function (ejection fraction normal). Moderate concentric hypertrophy. Estimated left ventricular ejection   fraction is 65 - 70%. Visually measured ejection fraction. No regional   wall motion abnormality noted. Moderate (grade 2) left ventricular   diastolic dysfunction. · Mildly dilated left atrium. · Pulmonary hypertension. Pulmonary arterial systolic pressure is 61 mmHg. · Severely elevated central venous pressure (15+ mmHg); IVC diameter is   larger than 21 mm and collapses less than 50% with respiration. Signed by: Gurinder Ashraf MD        Special Testing/Procedures:  MODALITY RESULTS   MICRO All Micro Results     Procedure Component Value Units Date/Time    CULTURE, BLOOD [[id number]] Collected:  07/09/20 1553    Order Status:  Completed Specimen:  Blood Updated:  07/15/20 0712     Special Requests: NO SPECIAL REQUESTS        Culture result: NO GROWTH 6 DAYS       CULTURE, BLOOD [[id number]] Collected:  07/09/20 1543    Order Status:  Completed Specimen:  Blood Updated:  07/15/20 0712     Special Requests: NO SPECIAL REQUESTS        Culture result: NO GROWTH 6 DAYS       CULTURE, URINE [[id number]] Collected:  07/09/20 1916    Order Status:  Completed Specimen:  Urine from Clean catch Updated:  07/11/20 0925     Special Requests: NO SPECIAL REQUESTS        Culture result:       No significant growth, <10,000 CFU/mL               UA No results found for this or any previous visit. PATH none     Telemetry Yes   Oxygen 3 l/m NC     Assessment and Plan:     Acute on chronic hypoxic respiratory failure. possibly 2/2 Covid PNA, COPD exacerbation: Pt w/ positive covid swab (7/7). Pt with severe underlying lung disease, Severe COPD on max therapy w/ strong asthma component. Hospitalized 5x in 2020 for COPD exacerbations. Followed by Dr. Faye Millard in [place].   -NC and nightly BIPAP.  Target sats 88-92  -Continue Brovana, Pulmicort, Solu-medrol  -FU pulmonary recs   -Treatment dose Lovenox   -Daily Covid labs: cbc, bmp, ferritin, LD, D-dimer, CRP, coags   - Plan unchanged, DC approaching if continue stable     Hypomagnesemia and hypophosphatemia    -replete both  -monitor and recheck after repletion     Insulin dependent Type 2 Diabete: Home lantus 40u pm and novolog SSI.  this AM  -stay at Lantus at 45 U.  -SSI   -Lowering Steroid dosing, expecting to see drop in glucose further as it drops  -Accuchecks ACHS  -Diabetic diet     Hypertension, HFpEF: ECHO (5/24/20) QI 65 - 70%. No regional wall motion abnormality. Moderate (grade 2) left ventricular diastolic dysfunction. Mildly dilated left atrium. Pulmonary hypertension. Pulmonary arterial systolic pressure is 61 mmHg. Severely elevated central venous pressure (15+ mmHg); IVC diameter is larger than 21 mm and collapses less than 50% with respiration. SBP in ED elevated  to 140s-170s. Pt on lisinopril 20mg BID and HCTZ 12.5mg daily at home. Pt also on Lasix 20mg daily PRN for lower extremity edema at home. Patient endorsing increased swelling in  Abdomen and legs.       - Continue Lasix 20mg daily  - Continue Lisinopril 20mg BID  - Continue HCTZ 12.5 mg daily     GERD: Pt takes omeprazole 40mg daily and pepcid for breakthrough symptoms at home.   -Continue protonix 40mg daily     Morbid obesity  -Diabetic diet       Diet Diabetic   DVT Prophylaxis Lovenox   GI Prophylaxis Protonix   Code status Full   Disposition <2 days when stable     Point of Contact Amye [place]  Relationship: Daughter  [phone]     Anabella De La Cruz MD, PGY-1   [address]   Intern Pager: [phone]   July 17, 2020, 8:16 AM

## 2020-07-18 VITALS
RESPIRATION RATE: 16 BRPM | TEMPERATURE: 97.1 F | HEIGHT: 63 IN | SYSTOLIC BLOOD PRESSURE: 119 MMHG | BODY MASS INDEX: 47.45 KG/M2 | DIASTOLIC BLOOD PRESSURE: 58 MMHG | OXYGEN SATURATION: 91 % | HEART RATE: 62 BPM | WEIGHT: 267.8 LBS

## 2020-07-18 LAB
ANION GAP SERPL CALC-SCNC: 2 MMOL/L (ref 3–18)
APTT PPP: 24.6 SEC (ref 23–36.4)
BASOPHILS # BLD: 0 K/UL (ref 0–0.06)
BASOPHILS NFR BLD: 0 % (ref 0–3)
BUN SERPL-MCNC: 25 MG/DL (ref 7–18)
BUN/CREAT SERPL: 26 (ref 12–20)
CALCIUM SERPL-MCNC: 9.3 MG/DL (ref 8.5–10.1)
CHLORIDE SERPL-SCNC: 99 MMOL/L (ref 100–111)
CO2 SERPL-SCNC: 37 MMOL/L (ref 21–32)
CREAT SERPL-MCNC: 0.95 MG/DL (ref 0.6–1.3)
CRP SERPL-MCNC: 0.9 MG/DL (ref 0–0.3)
D DIMER PPP FEU-MCNC: <0.27 UG/ML(FEU)
DIFFERENTIAL METHOD BLD: ABNORMAL
EOSINOPHIL # BLD: 0 K/UL (ref 0–0.4)
EOSINOPHIL NFR BLD: 0 % (ref 0–5)
ERYTHROCYTE [DISTWIDTH] IN BLOOD BY AUTOMATED COUNT: 13.5 % (ref 11.6–14.5)
FERRITIN SERPL-MCNC: 68 NG/ML (ref 8–388)
FIBRINOGEN PPP-MCNC: 439 MG/DL (ref 210–451)
GLUCOSE BLD STRIP.AUTO-MCNC: 103 MG/DL (ref 70–110)
GLUCOSE BLD STRIP.AUTO-MCNC: 131 MG/DL (ref 70–110)
GLUCOSE SERPL-MCNC: 150 MG/DL (ref 74–99)
HCT VFR BLD AUTO: 37.1 % (ref 35–45)
HGB BLD-MCNC: 12.2 G/DL (ref 12–16)
INR PPP: 1.1 (ref 0.8–1.2)
LYMPHOCYTES # BLD: 1.9 K/UL (ref 0.8–3.5)
LYMPHOCYTES NFR BLD: 19 % (ref 20–51)
MAGNESIUM SERPL-MCNC: 2.3 MG/DL (ref 1.6–2.6)
MCH RBC QN AUTO: 29.9 PG (ref 24–34)
MCHC RBC AUTO-ENTMCNC: 32.9 G/DL (ref 31–37)
MCV RBC AUTO: 90.9 FL (ref 74–97)
METAMYELOCYTES NFR BLD MANUAL: 2 %
MONOCYTES # BLD: 0.3 K/UL (ref 0–1)
MONOCYTES NFR BLD: 3 % (ref 2–9)
NEUTS SEG # BLD: 7.4 K/UL (ref 1.8–8)
NEUTS SEG NFR BLD: 76 % (ref 42–75)
PHOSPHATE SERPL-MCNC: 3.8 MG/DL (ref 2.5–4.9)
PLATELET # BLD AUTO: 398 K/UL (ref 135–420)
PLATELET COMMENTS,PCOM: ABNORMAL
PMV BLD AUTO: 9.2 FL (ref 9.2–11.8)
POTASSIUM SERPL-SCNC: 4 MMOL/L (ref 3.5–5.5)
PROTHROMBIN TIME: 13.6 SEC (ref 11.5–15.2)
RBC # BLD AUTO: 4.08 M/UL (ref 4.2–5.3)
RBC MORPH BLD: ABNORMAL
SODIUM SERPL-SCNC: 138 MMOL/L (ref 136–145)
WBC # BLD AUTO: 9.8 K/UL (ref 4.6–13.2)

## 2020-07-18 PROCEDURE — 74011250637 HC RX REV CODE- 250/637: Performed by: STUDENT IN AN ORGANIZED HEALTH CARE EDUCATION/TRAINING PROGRAM

## 2020-07-18 PROCEDURE — 74011250636 HC RX REV CODE- 250/636: Performed by: INTERNAL MEDICINE

## 2020-07-18 PROCEDURE — 84100 ASSAY OF PHOSPHORUS: CPT

## 2020-07-18 PROCEDURE — 86140 C-REACTIVE PROTEIN: CPT

## 2020-07-18 PROCEDURE — 94660 CPAP INITIATION&MGMT: CPT

## 2020-07-18 PROCEDURE — 74011636637 HC RX REV CODE- 636/637: Performed by: FAMILY MEDICINE

## 2020-07-18 PROCEDURE — 83735 ASSAY OF MAGNESIUM: CPT

## 2020-07-18 PROCEDURE — 85730 THROMBOPLASTIN TIME PARTIAL: CPT

## 2020-07-18 PROCEDURE — 74011250637 HC RX REV CODE- 250/637: Performed by: FAMILY MEDICINE

## 2020-07-18 PROCEDURE — 85025 COMPLETE CBC W/AUTO DIFF WBC: CPT

## 2020-07-18 PROCEDURE — 82728 ASSAY OF FERRITIN: CPT

## 2020-07-18 PROCEDURE — 85610 PROTHROMBIN TIME: CPT

## 2020-07-18 PROCEDURE — 80048 BASIC METABOLIC PNL TOTAL CA: CPT

## 2020-07-18 PROCEDURE — 36415 COLL VENOUS BLD VENIPUNCTURE: CPT

## 2020-07-18 PROCEDURE — 74011636637 HC RX REV CODE- 636/637: Performed by: INTERNAL MEDICINE

## 2020-07-18 PROCEDURE — 85384 FIBRINOGEN ACTIVITY: CPT

## 2020-07-18 PROCEDURE — 82962 GLUCOSE BLOOD TEST: CPT

## 2020-07-18 PROCEDURE — 85379 FIBRIN DEGRADATION QUANT: CPT

## 2020-07-18 PROCEDURE — 74011636637 HC RX REV CODE- 636/637: Performed by: STUDENT IN AN ORGANIZED HEALTH CARE EDUCATION/TRAINING PROGRAM

## 2020-07-18 RX ORDER — PREDNISONE 20 MG/1
TABLET ORAL
Qty: 21 TAB | Refills: 0 | Status: SHIPPED | OUTPATIENT
Start: 2020-07-19 | End: 2020-08-02

## 2020-07-18 RX ADMIN — INSULIN LISPRO 5 UNITS: 100 INJECTION, SOLUTION INTRAVENOUS; SUBCUTANEOUS at 09:40

## 2020-07-18 RX ADMIN — Medication 500 MG: at 09:46

## 2020-07-18 RX ADMIN — INSULIN LISPRO 5 UNITS: 100 INJECTION, SOLUTION INTRAVENOUS; SUBCUTANEOUS at 18:03

## 2020-07-18 RX ADMIN — BUDESONIDE AND FORMOTEROL FUMARATE DIHYDRATE 2 PUFF: 160; 4.5 AEROSOL RESPIRATORY (INHALATION) at 08:00

## 2020-07-18 RX ADMIN — INSULIN LISPRO 3 UNITS: 100 INJECTION, SOLUTION INTRAVENOUS; SUBCUTANEOUS at 18:03

## 2020-07-18 RX ADMIN — HYDROCHLOROTHIAZIDE 12.5 MG: 25 TABLET ORAL at 09:41

## 2020-07-18 RX ADMIN — LORATADINE 10 MG: 10 TABLET ORAL at 09:40

## 2020-07-18 RX ADMIN — FLUTICASONE PROPIONATE 2 SPRAY: 50 SPRAY, METERED NASAL at 09:00

## 2020-07-18 RX ADMIN — PANTOPRAZOLE 40 MG: 40 TABLET, DELAYED RELEASE ORAL at 09:41

## 2020-07-18 RX ADMIN — INSULIN LISPRO 3 UNITS: 100 INJECTION, SOLUTION INTRAVENOUS; SUBCUTANEOUS at 09:40

## 2020-07-18 RX ADMIN — ZINC SULFATE 220 MG (50 MG) CAPSULE 1 CAPSULE: CAPSULE at 09:41

## 2020-07-18 RX ADMIN — INSULIN LISPRO 5 UNITS: 100 INJECTION, SOLUTION INTRAVENOUS; SUBCUTANEOUS at 12:01

## 2020-07-18 RX ADMIN — CHOLECALCIFEROL TAB 25 MCG (1000 UNIT) 2 TABLET: 25 TAB at 09:40

## 2020-07-18 RX ADMIN — PREDNISONE 40 MG: 20 TABLET ORAL at 09:40

## 2020-07-18 RX ADMIN — FUROSEMIDE 20 MG: 20 TABLET ORAL at 09:41

## 2020-07-18 RX ADMIN — ENOXAPARIN SODIUM 40 MG: 40 INJECTION SUBCUTANEOUS at 09:41

## 2020-07-18 RX ADMIN — LISINOPRIL 20 MG: 20 TABLET ORAL at 09:41

## 2020-07-18 NOTE — ROUTINE PROCESS
Bedside and Verbal shift change report given to Galina (oncoming nurse) by Jeevan Owens (offgoing nurse). Report included the following information SBAR, Kardex, Intake/Output, MAR, Recent Results and Alarm Parameters .

## 2020-07-18 NOTE — ROUTINE PROCESS
I have reviewed discharge instructions with the patient. The patient verbalized understanding. Prescription and follow up appointments given to patient. The patient was also told about keeping a safe distance from members of the same household and all the necessary precautions to take.

## 2020-07-18 NOTE — PROGRESS NOTES
[facility]  Progress Note    Patient: Renita Mullen MRN: [id number]   SSN: [id number]  YOB: 1960   Age: 60 y.o. Sex: female      Admit Date: 7/9/2020    LOS: 9 days   Chief Complaint   Patient presents with    Concern For COVID-19 (Coronavirus)    Shortness of Breath       Subjective:     Per nurse, patient was well overnight. Called patient and spoke with her. She feels as though she is getter every day, and she says that she is feeling well enough to go home today. She says everything she is doing here, she is able to do at home. Continuing to stay on 3 lpm, sats at 92-95%. VSS, and patient seems well overall. Essentially unchanged from yesterday. Will defer full exam and Physical due to COVID     ROS as in subjective    Objective:     Visit Vitals  /58   Pulse 62   Temp 97.1 °F (36.2 °C)   Resp 16   Ht 5' 3\" (1.6 m)   Wt 121.5 kg (267 lb 12.8 oz)   SpO2 91%   BMI 47.44 kg/m²       Physical Exam:   Deferred     Intake and Output:  Current Shift: No intake/output data recorded. Last three shifts: No intake/output data recorded. Lab/Data Review:  Recent Results (from the past 12 hour(s))   C REACTIVE PROTEIN, QT    Collection Time: 07/18/20  5:20 AM   Result Value Ref Range    C-Reactive protein 0.9 (H) 0 - 0.3 mg/dL   CBC WITH AUTOMATED DIFF    Collection Time: 07/18/20  5:20 AM   Result Value Ref Range    WBC 9.8 4.6 - 13.2 K/uL    RBC 4.08 (L) 4.20 - 5.30 M/uL    HGB 12.2 12.0 - 16.0 g/dL    HCT 37.1 35.0 - 45.0 %    MCV 90.9 74.0 - 97.0 FL    MCH 29.9 24.0 - 34.0 PG    MCHC 32.9 31.0 - 37.0 g/dL    RDW 13.5 11.6 - 14.5 %    PLATELET 273 154 - 122 K/uL    MPV 9.2 9.2 - 11.8 FL    NEUTROPHILS 76 (H) 42 - 75 %    LYMPHOCYTES 19 (L) 20 - 51 %    MONOCYTES 3 2 - 9 %    EOSINOPHILS 0 0 - 5 %    BASOPHILS 0 0 - 3 %    METAMYELOCYTES 2 (H) 0 %    ABS. NEUTROPHILS 7.4 1.8 - 8.0 K/UL    ABS. LYMPHOCYTES 1.9 0.8 - 3.5 K/UL    ABS. MONOCYTES 0.3 0 - 1.0 K/UL    ABS. EOSINOPHILS 0.0 0.0 - 0.4 K/UL    ABS. BASOPHILS 0.0 0.0 - 0.06 K/UL    DF MANUAL      PLATELET COMMENTS ADEQUATE PLATELETS      RBC COMMENTS NORMOCYTIC, NORMOCHROMIC     FERRITIN    Collection Time: 07/18/20  5:20 AM   Result Value Ref Range    Ferritin 68 8 - 388 NG/ML   D DIMER    Collection Time: 07/18/20  5:20 AM   Result Value Ref Range    D DIMER <0.27 <0.46 ug/ml(FEU)   FIBRINOGEN    Collection Time: 07/18/20  5:20 AM   Result Value Ref Range    Fibrinogen 439 210 - 451 mg/dL   PROTHROMBIN TIME + INR    Collection Time: 07/18/20  5:20 AM   Result Value Ref Range    Prothrombin time 13.6 11.5 - 15.2 sec    INR 1.1 0.8 - 1.2     PTT    Collection Time: 07/18/20  5:20 AM   Result Value Ref Range    aPTT 24.6 23.0 - 36.4 SEC   MAGNESIUM    Collection Time: 07/18/20  5:20 AM   Result Value Ref Range    Magnesium 2.3 1.6 - 2.6 mg/dL   PHOSPHORUS    Collection Time: 07/18/20  5:20 AM   Result Value Ref Range    Phosphorus 3.8 2.5 - 4.9 MG/DL   METABOLIC PANEL, BASIC    Collection Time: 07/18/20  5:20 AM   Result Value Ref Range    Sodium 138 136 - 145 mmol/L    Potassium 4.0 3.5 - 5.5 mmol/L    Chloride 99 (L) 100 - 111 mmol/L    CO2 37 (H) 21 - 32 mmol/L    Anion gap 2 (L) 3.0 - 18 mmol/L    Glucose 150 (H) 74 - 99 mg/dL    BUN 25 (H) 7.0 - 18 MG/DL    Creatinine 0.95 0.6 - 1.3 MG/DL    BUN/Creatinine ratio 26 (H) 12 - 20      GFR est AA >60 >60 ml/min/1.73m2    GFR est non-AA >60 >60 ml/min/1.73m2    Calcium 9.3 8.5 - 10.1 MG/DL   GLUCOSE, POC    Collection Time: 07/18/20  9:13 AM   Result Value Ref Range    Glucose (POC) 103 70 - 110 mg/dL       RECENT RESULTS  MODALITY IMPRESSION   XR Results from Hospital Encounter encounter on 07/09/20   XR CHEST PORT    Narrative Portable CXR:    HISTORY: Respiratory distress. Covid 19    Comparison July 11, 2020 1706 hours    Stable cardiomegaly. No vascular congestion. Mild density in the lung bases,  grossly unchanged. Small pleural effusion.       Impression IMPRESSION: No acute change with bilateral basilar infiltrate/pneumonia. No CHF. CT Results from [place] encounter on 04/08/20   CT ABD PELV WO CONT    Narrative CT ABDOMEN AND PELVIS WITHOUT ENHANCEMENT    INDICATION: Nausea, right lower quadrant pain, vomiting since this morning. TECHNIQUE: Axial images obtained of the abdomen and pelvis without intravenous  contrast. Coronal and sagittal reformatted images of the abdomen and pelvis were  obtained. All CT scans at this facility are performed using dose optimization technique as  appropriate to a performed exam, to include automated exposure control,  adjustment of the mA and/or kV according to patient size (including appropriate  matching first site-specific examinations), or use of iterative reconstruction  technique. COMPARISON: 7/14/2019. Lack of intravenous contrast renders this study suboptimal for evaluating the  solid abdominal organs, vasculature and bowel. ABDOMEN FINDINGS:     Liver: Liver is 22.5 cm craniocaudal with mild decreased density. Spleen: Unremarkable. Pancreas: Unremarkable. Biliary: Cholecystectomy. Bowel: Mild diverticulosis. Similar small bowel in a periumbilical hernia. Peritoneum/ Retroperitoneum: Unremarkable. Lymph Nodes: Unremarkable. Adrenal Glands: Unremarkable. Kidneys: Unremarkable. Vessels: Moderate atherosclerosis. Multifocal stenosis of the visceral ostia. PELVIS FINDINGS:     Bladder/ Pelvic Organs: Unremarkable. Lung Base: Severe coronary arteriosclerosis left coronary artery and LAD. Centrilobular emphysema. Subsegmental atelectasis at the lung bases. Bones/Soft tissues: Degenerative changes bilateral hips. Degenerative disc  disease. Impression IMPRESSION:    Unchanged small bowel containing periumbilical hernia without associated  obstruction. Hepatomegaly and mild steatosis. Mild diverticulosis.   Moderate atherosclerosis with multifocal visceral ostia stenosis. Severe left coronary arteriosclerosis. Emphysema. MRI No results found for this or any previous visit. ULTRASOUND Results from [place] encounter on 04/10/17   US ABD COMP    Impression IMPRESSION:       1. Echogenic mildly enlarged liver, suggestive of hepatic steatosis. No focal  hepatic lesion identified. 2. Status post cholecystectomy. No biliary ductal dilatation. Cardiology Procedures/Testing:  MODALITY RESULTS   EKG Results for orders placed or performed during the hospital encounter of 07/09/20   EKG, 12 LEAD, INITIAL   Result Value Ref Range    Ventricular Rate 86 BPM    Atrial Rate 86 BPM    P-R Interval 136 ms    QRS Duration 86 ms    Q-T Interval 354 ms    QTC Calculation (Bezet) 423 ms    Calculated P Axis 35 degrees    Calculated R Axis 32 degrees    Calculated T Axis 38 degrees    Diagnosis       Normal sinus rhythm  Normal ECG  When compared with ECG of 07-JUL-2020 16:09,  No significant change was found  Confirmed by Rocío Spicer MD, [facility] ([id number]) on 7/10/2020 1:06:31 PM         ECHO 05/21/20   ECHO ADULT COMPLETE 05/25/2020 5/25/2020    Narrative · Image quality for this study was technically difficult. Contrast used:   DEFINITY. · Normal cavity size and systolic function (ejection fraction normal). Moderate concentric hypertrophy. Estimated left ventricular ejection   fraction is 65 - 70%. Visually measured ejection fraction. No regional   wall motion abnormality noted. Moderate (grade 2) left ventricular   diastolic dysfunction. · Mildly dilated left atrium. · Pulmonary hypertension. Pulmonary arterial systolic pressure is 61 mmHg. · Severely elevated central venous pressure (15+ mmHg); IVC diameter is   larger than 21 mm and collapses less than 50% with respiration.         Signed by: Danni Villela MD        Special Testing/Procedures:  MODALITY RESULTS   MICRO All Micro Results     Procedure Component Value Units Date/Time    CULTURE, BLOOD [[id number]] Collected:  07/09/20 1553    Order Status:  Completed Specimen:  Blood Updated:  07/15/20 0712     Special Requests: NO SPECIAL REQUESTS        Culture result: NO GROWTH 6 DAYS       CULTURE, BLOOD [[id number]] Collected:  07/09/20 1543    Order Status:  Completed Specimen:  Blood Updated:  07/15/20 0712     Special Requests: NO SPECIAL REQUESTS        Culture result: NO GROWTH 6 DAYS       CULTURE, URINE [[id number]] Collected:  07/09/20 1916    Order Status:  Completed Specimen:  Urine from Clean catch Updated:  07/11/20 0925     Special Requests: NO SPECIAL REQUESTS        Culture result:       No significant growth, <10,000 CFU/mL               UA No results found for this or any previous visit. PATH none     Telemetry Yes   Oxygen 3 l/m NC     Assessment and Plan:     Acute on chronic hypoxic respiratory failure. possibly 2/2 Covid PNA, COPD exacerbation: Pt w/ positive covid swab (7/7). Pt with severe underlying lung disease, Severe COPD on max therapy w/ strong asthma component. Hospitalized 5x in 2020 for COPD exacerbations. Followed by Dr. Amol Al in [place].   -NC and nightly BIPAP. Target sats 88-92  -Continue Brovana, Pulmicort, Solu-medrol  -FU pulmonary recs   -Treatment dose Lovenox   -Daily Covid labs: cbc, bmp, ferritin, LD, D-dimer, CRP, coags   -Patient thinks she is good to possibly DC today, agree, will wait on any recs from consultants.     Hypomagnesemia and hypophosphatemia(resolved)  -monitor     Insulin dependent Type 2 Diabete: Home lantus 40u pm and novolog SSI.  this AM  -stay at Lantus at 45 U.  -SSI   -Lowering Steroid dosing, expecting to see drop in glucose further as it drops  -Accuchecks ACHS  -Diabetic diet     Hypertension, HFpEF: ECHO (5/24/20) YC 74 - 70%. No regional wall motion abnormality. Moderate (grade 2) left ventricular diastolic dysfunction. Mildly dilated left atrium. Pulmonary hypertension. Pulmonary arterial systolic pressure is 61 mmHg.  Severely elevated central venous pressure (15+ mmHg); IVC diameter is larger than 21 mm and collapses less than 50% with respiration. SBP in ED elevated  to 140s-170s. Pt on lisinopril 20mg BID and HCTZ 12.5mg daily at home. Pt also on Lasix 20mg daily PRN for lower extremity edema at home. Patient endorsing increased swelling in  Abdomen and legs.       - Continue Lasix 20mg daily  - Continue Lisinopril 20mg BID  - Continue HCTZ 12.5 mg daily     GERD: Pt takes omeprazole 40mg daily and pepcid for breakthrough symptoms at home.   -Continue protonix 40mg daily     Morbid obesity  -Diabetic diet       Diet Diabetic   DVT Prophylaxis Lovenox   GI Prophylaxis Protonix   Code status Full   Disposition <2 days when stable     Point of Contact Pamela López  Relationship: Daughter  [phone]     Spencer Dixon MD, PGY-1   [address]   Intern Pager: [phone]   July 18, 2020, 9:58 AM

## 2020-07-18 NOTE — PROGRESS NOTES
Discharge order noted for today. Pt has been referred to [facility] agency. [place] of choice on file. Met with patient  and are agreeable to the transition plan today. Transport has been arranged through family. Patient's discharge summary and home health  orders have been forwarded to [facility] health  agency via [address]. Updated bedside RN,  to the transition plan. Discharge information has been documented on the AVS. Patient already has walker and BSC and home oxygen.       Hemant Christina RN BSN  Care Manager  [phone]

## 2020-07-18 NOTE — PROGRESS NOTES
Problem: Falls - Risk of  Goal: *Absence of Falls  Description: Document Suly Figueroa Fall Risk and appropriate interventions in the flowsheet. Outcome: Progressing Towards Goal  Note: Fall Risk Interventions:  Mobility Interventions: Bed/chair exit alarm         Medication Interventions: Teach patient to arise slowly    Elimination Interventions: Call light in reach              Problem: Patient Education: Go to Patient Education Activity  Goal: Patient/Family Education  Outcome: Progressing Towards Goal     Problem: Airway Clearance - Ineffective  Goal: Achieve or maintain patent airway  Outcome: Progressing Towards Goal     Problem: Gas Exchange - Impaired  Goal: Absence of hypoxia  Outcome: Progressing Towards Goal  Goal: Promote optimal lung function  Outcome: Progressing Towards Goal     Problem: Breathing Pattern - Ineffective  Goal: Ability to achieve and maintain a regular respiratory rate  Outcome: Progressing Towards Goal     Problem:  Body Temperature -  Risk of, Imbalanced  Goal: Ability to maintain a body temperature within defined limits  Outcome: Progressing Towards Goal  Goal: Will regain or maintain usual level of consciousness  Outcome: Progressing Towards Goal  Goal: Complications related to the disease process, condition or treatment will be avoided or minimized  Outcome: Progressing Towards Goal     Problem: Isolation Precautions - Risk of Spread of Infection  Goal: Prevent transmission of infectious organism to others  Outcome: Progressing Towards Goal     Problem: Nutrition Deficits  Goal: Optimize nutrtional status  Outcome: Progressing Towards Goal     Problem: Risk for Fluid Volume Deficit  Goal: Maintain normal heart rhythm  Outcome: Progressing Towards Goal  Goal: Maintain absence of muscle cramping  Outcome: Progressing Towards Goal  Goal: Maintain normal serum potassium, sodium, calcium, phosphorus, and pH  Outcome: Progressing Towards Goal     Problem: Loneliness or Risk for Loneliness  Goal: Demonstrate positive use of time alone when socialization is not possible  Outcome: Progressing Towards Goal     Problem: Fatigue  Goal: Verbalize increase energy and improved vitality  Outcome: Progressing Towards Goal     Problem: Patient Education: Go to Patient Education Activity  Goal: Patient/Family Education  Outcome: Progressing Towards Goal     Problem: Pressure Injury - Risk of  Goal: *Prevention of pressure injury  Description: Document Viktor Scale and appropriate interventions in the flowsheet.   Outcome: Progressing Towards Goal

## 2020-07-18 NOTE — PROGRESS NOTES
Spoke with ID over potential need for anticoagulation with COVID. They explained that although there are some places with instances of ongoing anticoagulation after discharge, those have so far been seen only in more critical patients. This patient is not meeting this level of acuity, and her coagulation and clotting studies have returned wnl, and so she does not need ongoing anticoagulation at this time.      Liyah Cordoba MD, PGY-1   [address] Medicine   Intern Pager: [phone]   July 18, 2020, 4:29 PM

## 2020-07-18 NOTE — ROUTINE PROCESS
Received verbal report from Iman Escalante, report included SBAR, MAR, and Kardex. Gave verbal report to Gucci Gould RN, report included SBAR, MAR, and Kardex.

## 2020-07-20 ENCOUNTER — PATIENT OUTREACH (OUTPATIENT)
Dept: CASE MANAGEMENT | Age: 60
End: 2020-07-20

## 2020-07-20 NOTE — PROGRESS NOTES
Patient contacted regarding COVID-19 diagnosis. Discussed COVID-19 related testing which was available at this time. Test results were positive. Patient informed of results, if available? yes     Care Transition Nurse/ Ambulatory Care Manager contacted the patient by telephone to perform post discharge assessment. Verified name and  with patient as identifiers. Provided introduction to self, and explanation of the CTN/ACM role, and reason for call due to risk factors for infection and/or exposure to COVID-19. Symptoms reviewed with patient who verbalized the following symptoms: fatigue, cough and shortness of breath. Due to no new or worsening symptoms encounter was not routed to provider for escalation. Discussed follow-up appointments. If no appointment was previously scheduled, appointment scheduling offered: yes  [address] follow up appointment(s): No future appointments. Non-[facility] follow up appointment(s): Follow up with PCP Dr. Harmoyn Spain on 20      Advance Care Planning:   Does patient have an Advance Directive: not on file; education provided     Patient has following risk factors of: COPD and diabetes. CTN/ACM reviewed discharge instructions, medical action plan and red flags such as increased shortness of breath, increasing fever and signs of decompensation with patient who verbalized understanding. Discussed exposure protocols and quarantine with CDC Guidelines What to do if you are sick with coronavirus disease .  Patient was given an opportunity for questions and concerns. The patient agrees to contact the [facility] exposure line [phone], Replaced by [facility] [address]  ([phone]) and PCP office for questions related to their healthcare. CTN/ACM provided contact information for future needs. Reviewed and educated patient on any new and changed medications related to discharge diagnosis.     Patient/family/caregiver given information for [employer] and agrees to enroll no      Plan for follow-up call in 3-5 days based on severity of symptoms and risk factors.

## 2020-07-22 ENCOUNTER — APPOINTMENT (OUTPATIENT)
Dept: GENERAL RADIOLOGY | Age: 60
DRG: 177 | End: 2020-07-22
Attending: STUDENT IN AN ORGANIZED HEALTH CARE EDUCATION/TRAINING PROGRAM
Payer: MEDICARE

## 2020-07-22 ENCOUNTER — APPOINTMENT (OUTPATIENT)
Dept: GENERAL RADIOLOGY | Age: 60
DRG: 177 | End: 2020-07-22
Attending: EMERGENCY MEDICINE
Payer: MEDICARE

## 2020-07-22 ENCOUNTER — APPOINTMENT (OUTPATIENT)
Dept: VASCULAR SURGERY | Age: 60
DRG: 177 | End: 2020-07-22
Attending: STUDENT IN AN ORGANIZED HEALTH CARE EDUCATION/TRAINING PROGRAM
Payer: MEDICARE

## 2020-07-22 ENCOUNTER — HOSPITAL ENCOUNTER (INPATIENT)
Age: 60
LOS: 5 days | Discharge: HOME HEALTH | DRG: 177 | End: 2020-07-27
Attending: EMERGENCY MEDICINE | Admitting: FAMILY MEDICINE
Payer: MEDICARE

## 2020-07-22 DIAGNOSIS — R09.02 HYPOXIA: ICD-10-CM

## 2020-07-22 DIAGNOSIS — U07.1 COVID-19: ICD-10-CM

## 2020-07-22 DIAGNOSIS — J44.1 ACUTE EXACERBATION OF CHRONIC OBSTRUCTIVE PULMONARY DISEASE (COPD) (HCC): Primary | ICD-10-CM

## 2020-07-22 PROBLEM — J06.9 ACUTE RESPIRATORY DISEASE DUE TO COVID-19 VIRUS: Status: ACTIVE | Noted: 2020-07-22

## 2020-07-22 LAB
ABO + RH BLD: NORMAL
ALBUMIN SERPL-MCNC: 2.8 G/DL (ref 3.4–5)
ALBUMIN/GLOB SERPL: 0.8 {RATIO} (ref 0.8–1.7)
ALP SERPL-CCNC: 66 U/L (ref 45–117)
ALT SERPL-CCNC: 47 U/L (ref 13–56)
ANION GAP SERPL CALC-SCNC: 5 MMOL/L (ref 3–18)
APTT PPP: 22.4 SEC (ref 23–36.4)
AST SERPL-CCNC: 35 U/L (ref 10–38)
ATRIAL RATE: 80 BPM
BASOPHILS # BLD: 0 K/UL (ref 0–0.1)
BASOPHILS NFR BLD: 0 % (ref 0–2)
BILIRUB SERPL-MCNC: 0.5 MG/DL (ref 0.2–1)
BLOOD GROUP ANTIBODIES SERPL: NORMAL
BNP SERPL-MCNC: 263 PG/ML (ref 0–900)
BUN SERPL-MCNC: 16 MG/DL (ref 7–18)
BUN/CREAT SERPL: 19 (ref 12–20)
CALCIUM SERPL-MCNC: 9 MG/DL (ref 8.5–10.1)
CALCULATED P AXIS, ECG09: 22 DEGREES
CALCULATED R AXIS, ECG10: 0 DEGREES
CALCULATED T AXIS, ECG11: 40 DEGREES
CHLORIDE SERPL-SCNC: 105 MMOL/L (ref 100–111)
CO2 SERPL-SCNC: 30 MMOL/L (ref 21–32)
CREAT SERPL-MCNC: 0.83 MG/DL (ref 0.6–1.3)
CRP SERPL-MCNC: 4.1 MG/DL (ref 0–0.3)
D DIMER PPP FEU-MCNC: <0.27 UG/ML(FEU)
DIAGNOSIS, 93000: NORMAL
DIFFERENTIAL METHOD BLD: ABNORMAL
EOSINOPHIL # BLD: 0.1 K/UL (ref 0–0.4)
EOSINOPHIL NFR BLD: 1 % (ref 0–5)
ERYTHROCYTE [DISTWIDTH] IN BLOOD BY AUTOMATED COUNT: 14 % (ref 11.6–14.5)
FERRITIN SERPL-MCNC: 51 NG/ML (ref 8–388)
FIBRINOGEN PPP-MCNC: 541 MG/DL (ref 210–451)
GLOBULIN SER CALC-MCNC: 3.7 G/DL (ref 2–4)
GLUCOSE BLD STRIP.AUTO-MCNC: 300 MG/DL (ref 70–110)
GLUCOSE SERPL-MCNC: 240 MG/DL (ref 74–99)
HCT VFR BLD AUTO: 35.3 % (ref 35–45)
HGB BLD-MCNC: 11.6 G/DL (ref 12–16)
INR PPP: 1 (ref 0.8–1.2)
INR PPP: 1.1 (ref 0.8–1.2)
LDH SERPL L TO P-CCNC: 426 U/L (ref 81–234)
LDH SERPL L TO P-CCNC: 524 U/L (ref 81–234)
LYMPHOCYTES # BLD: 1.3 K/UL (ref 0.9–3.6)
LYMPHOCYTES NFR BLD: 13 % (ref 21–52)
MAGNESIUM SERPL-MCNC: 1.7 MG/DL (ref 1.6–2.6)
MCH RBC QN AUTO: 30.7 PG (ref 24–34)
MCHC RBC AUTO-ENTMCNC: 32.9 G/DL (ref 31–37)
MCV RBC AUTO: 93.4 FL (ref 74–97)
MONOCYTES # BLD: 1.1 K/UL (ref 0.05–1.2)
MONOCYTES NFR BLD: 11 % (ref 3–10)
NEUTS SEG # BLD: 7.3 K/UL (ref 1.8–8)
NEUTS SEG NFR BLD: 75 % (ref 40–73)
P-R INTERVAL, ECG05: 142 MS
PHOSPHATE SERPL-MCNC: 3.2 MG/DL (ref 2.5–4.9)
PLATELET # BLD AUTO: 393 K/UL (ref 135–420)
PMV BLD AUTO: 9.4 FL (ref 9.2–11.8)
POTASSIUM SERPL-SCNC: 4.2 MMOL/L (ref 3.5–5.5)
PROCALCITONIN SERPL-MCNC: <0.05 NG/ML
PROT SERPL-MCNC: 6.5 G/DL (ref 6.4–8.2)
PROTHROMBIN TIME: 12.8 SEC (ref 11.5–15.2)
PROTHROMBIN TIME: 13.8 SEC (ref 11.5–15.2)
Q-T INTERVAL, ECG07: 378 MS
QRS DURATION, ECG06: 88 MS
QTC CALCULATION (BEZET), ECG08: 435 MS
RBC # BLD AUTO: 3.78 M/UL (ref 4.2–5.3)
SODIUM SERPL-SCNC: 140 MMOL/L (ref 136–145)
SPECIMEN EXP DATE BLD: NORMAL
VENTRICULAR RATE, ECG03: 80 BPM
WBC # BLD AUTO: 9.7 K/UL (ref 4.6–13.2)

## 2020-07-22 PROCEDURE — 83880 ASSAY OF NATRIURETIC PEPTIDE: CPT

## 2020-07-22 PROCEDURE — 71045 X-RAY EXAM CHEST 1 VIEW: CPT

## 2020-07-22 PROCEDURE — 93005 ELECTROCARDIOGRAM TRACING: CPT

## 2020-07-22 PROCEDURE — 85730 THROMBOPLASTIN TIME PARTIAL: CPT

## 2020-07-22 PROCEDURE — 82962 GLUCOSE BLOOD TEST: CPT

## 2020-07-22 PROCEDURE — 85610 PROTHROMBIN TIME: CPT

## 2020-07-22 PROCEDURE — 93970 EXTREMITY STUDY: CPT

## 2020-07-22 PROCEDURE — 74011250636 HC RX REV CODE- 250/636: Performed by: STUDENT IN AN ORGANIZED HEALTH CARE EDUCATION/TRAINING PROGRAM

## 2020-07-22 PROCEDURE — 96374 THER/PROPH/DIAG INJ IV PUSH: CPT

## 2020-07-22 PROCEDURE — 83735 ASSAY OF MAGNESIUM: CPT

## 2020-07-22 PROCEDURE — 74011250636 HC RX REV CODE- 250/636: Performed by: EMERGENCY MEDICINE

## 2020-07-22 PROCEDURE — 74011636637 HC RX REV CODE- 636/637: Performed by: STUDENT IN AN ORGANIZED HEALTH CARE EDUCATION/TRAINING PROGRAM

## 2020-07-22 PROCEDURE — 5A09357 ASSISTANCE WITH RESPIRATORY VENTILATION, LESS THAN 24 CONSECUTIVE HOURS, CONTINUOUS POSITIVE AIRWAY PRESSURE: ICD-10-PCS | Performed by: FAMILY MEDICINE

## 2020-07-22 PROCEDURE — 84100 ASSAY OF PHOSPHORUS: CPT

## 2020-07-22 PROCEDURE — 82728 ASSAY OF FERRITIN: CPT

## 2020-07-22 PROCEDURE — 83615 LACTATE (LD) (LDH) ENZYME: CPT

## 2020-07-22 PROCEDURE — 73564 X-RAY EXAM KNEE 4 OR MORE: CPT

## 2020-07-22 PROCEDURE — 80053 COMPREHEN METABOLIC PANEL: CPT

## 2020-07-22 PROCEDURE — 86900 BLOOD TYPING SEROLOGIC ABO: CPT

## 2020-07-22 PROCEDURE — 86140 C-REACTIVE PROTEIN: CPT

## 2020-07-22 PROCEDURE — 65660000000 HC RM CCU STEPDOWN

## 2020-07-22 PROCEDURE — 74011250637 HC RX REV CODE- 250/637: Performed by: EMERGENCY MEDICINE

## 2020-07-22 PROCEDURE — 85025 COMPLETE CBC W/AUTO DIFF WBC: CPT

## 2020-07-22 PROCEDURE — 74011250637 HC RX REV CODE- 250/637: Performed by: STUDENT IN AN ORGANIZED HEALTH CARE EDUCATION/TRAINING PROGRAM

## 2020-07-22 PROCEDURE — 94762 N-INVAS EAR/PLS OXIMTRY CONT: CPT

## 2020-07-22 PROCEDURE — 85379 FIBRIN DEGRADATION QUANT: CPT

## 2020-07-22 PROCEDURE — 84145 PROCALCITONIN (PCT): CPT

## 2020-07-22 PROCEDURE — 87635 SARS-COV-2 COVID-19 AMP PRB: CPT

## 2020-07-22 PROCEDURE — 99285 EMERGENCY DEPT VISIT HI MDM: CPT

## 2020-07-22 PROCEDURE — 73562 X-RAY EXAM OF KNEE 3: CPT

## 2020-07-22 PROCEDURE — 74011000258 HC RX REV CODE- 258: Performed by: STUDENT IN AN ORGANIZED HEALTH CARE EDUCATION/TRAINING PROGRAM

## 2020-07-22 PROCEDURE — 85384 FIBRINOGEN ACTIVITY: CPT

## 2020-07-22 RX ORDER — ALBUTEROL SULFATE 90 UG/1
2 AEROSOL, METERED RESPIRATORY (INHALATION)
Status: COMPLETED | OUTPATIENT
Start: 2020-07-22 | End: 2020-07-22

## 2020-07-22 RX ORDER — MAGNESIUM SULFATE 100 %
4 CRYSTALS MISCELLANEOUS AS NEEDED
Status: DISCONTINUED | OUTPATIENT
Start: 2020-07-22 | End: 2020-07-27 | Stop reason: HOSPADM

## 2020-07-22 RX ORDER — BENZONATATE 100 MG/1
100 CAPSULE ORAL
Status: DISCONTINUED | OUTPATIENT
Start: 2020-07-22 | End: 2020-07-27 | Stop reason: HOSPADM

## 2020-07-22 RX ORDER — MONTELUKAST SODIUM 10 MG/1
10 TABLET ORAL
Status: DISCONTINUED | OUTPATIENT
Start: 2020-07-22 | End: 2020-07-27 | Stop reason: HOSPADM

## 2020-07-22 RX ORDER — SIMETHICONE 80 MG
80 TABLET,CHEWABLE ORAL
Status: DISCONTINUED | OUTPATIENT
Start: 2020-07-22 | End: 2020-07-27 | Stop reason: HOSPADM

## 2020-07-22 RX ORDER — LORATADINE 10 MG/1
10 TABLET ORAL
Status: DISCONTINUED | OUTPATIENT
Start: 2020-07-22 | End: 2020-07-27 | Stop reason: HOSPADM

## 2020-07-22 RX ORDER — ASPIRIN 81 MG/1
81 TABLET ORAL DAILY
Status: DISCONTINUED | OUTPATIENT
Start: 2020-07-23 | End: 2020-07-27 | Stop reason: HOSPADM

## 2020-07-22 RX ORDER — SODIUM CHLORIDE 0.9 % (FLUSH) 0.9 %
5-40 SYRINGE (ML) INJECTION AS NEEDED
Status: DISCONTINUED | OUTPATIENT
Start: 2020-07-22 | End: 2020-07-27 | Stop reason: HOSPADM

## 2020-07-22 RX ORDER — ENOXAPARIN SODIUM 100 MG/ML
40 INJECTION SUBCUTANEOUS EVERY 12 HOURS
Status: DISCONTINUED | OUTPATIENT
Start: 2020-07-22 | End: 2020-07-27 | Stop reason: HOSPADM

## 2020-07-22 RX ORDER — DEXTROSE 50 % IN WATER (D50W) INTRAVENOUS SYRINGE
25-50 AS NEEDED
Status: DISCONTINUED | OUTPATIENT
Start: 2020-07-22 | End: 2020-07-27 | Stop reason: HOSPADM

## 2020-07-22 RX ORDER — LEVOFLOXACIN 5 MG/ML
750 INJECTION, SOLUTION INTRAVENOUS
Status: COMPLETED | OUTPATIENT
Start: 2020-07-22 | End: 2020-07-22

## 2020-07-22 RX ORDER — LISINOPRIL 20 MG/1
20 TABLET ORAL 2 TIMES DAILY
Status: DISCONTINUED | OUTPATIENT
Start: 2020-07-22 | End: 2020-07-27 | Stop reason: HOSPADM

## 2020-07-22 RX ORDER — SODIUM CHLORIDE 0.9 % (FLUSH) 0.9 %
5-40 SYRINGE (ML) INJECTION EVERY 8 HOURS
Status: DISCONTINUED | OUTPATIENT
Start: 2020-07-22 | End: 2020-07-27 | Stop reason: HOSPADM

## 2020-07-22 RX ORDER — HYDROCHLOROTHIAZIDE 25 MG/1
12.5 TABLET ORAL DAILY
Status: DISCONTINUED | OUTPATIENT
Start: 2020-07-23 | End: 2020-07-27 | Stop reason: HOSPADM

## 2020-07-22 RX ORDER — BUDESONIDE 1 MG/2ML
1000 INHALANT ORAL
Status: DISCONTINUED | OUTPATIENT
Start: 2020-07-22 | End: 2020-07-27 | Stop reason: HOSPADM

## 2020-07-22 RX ORDER — FUROSEMIDE 20 MG/1
20 TABLET ORAL DAILY
Status: DISCONTINUED | OUTPATIENT
Start: 2020-07-23 | End: 2020-07-27 | Stop reason: HOSPADM

## 2020-07-22 RX ORDER — IPRATROPIUM BROMIDE AND ALBUTEROL SULFATE 2.5; .5 MG/3ML; MG/3ML
3 SOLUTION RESPIRATORY (INHALATION)
Status: DISCONTINUED | OUTPATIENT
Start: 2020-07-22 | End: 2020-07-27 | Stop reason: HOSPADM

## 2020-07-22 RX ORDER — GUAIFENESIN 600 MG/1
1200 TABLET, EXTENDED RELEASE ORAL EVERY 12 HOURS
Status: DISCONTINUED | OUTPATIENT
Start: 2020-07-22 | End: 2020-07-27 | Stop reason: HOSPADM

## 2020-07-22 RX ORDER — INSULIN GLARGINE 100 [IU]/ML
45 INJECTION, SOLUTION SUBCUTANEOUS DAILY
Status: DISCONTINUED | OUTPATIENT
Start: 2020-07-23 | End: 2020-07-24

## 2020-07-22 RX ORDER — IPRATROPIUM BROMIDE AND ALBUTEROL SULFATE 2.5; .5 MG/3ML; MG/3ML
3 SOLUTION RESPIRATORY (INHALATION)
Status: DISCONTINUED | OUTPATIENT
Start: 2020-07-23 | End: 2020-07-27 | Stop reason: HOSPADM

## 2020-07-22 RX ORDER — ASCORBIC ACID 250 MG
500 TABLET ORAL 2 TIMES DAILY
Status: DISCONTINUED | OUTPATIENT
Start: 2020-07-22 | End: 2020-07-27 | Stop reason: HOSPADM

## 2020-07-22 RX ORDER — ZINC SULFATE 50(220)MG
1 CAPSULE ORAL DAILY
Status: DISCONTINUED | OUTPATIENT
Start: 2020-07-23 | End: 2020-07-27 | Stop reason: HOSPADM

## 2020-07-22 RX ORDER — INSULIN LISPRO 100 [IU]/ML
INJECTION, SOLUTION INTRAVENOUS; SUBCUTANEOUS
Status: DISCONTINUED | OUTPATIENT
Start: 2020-07-22 | End: 2020-07-27 | Stop reason: HOSPADM

## 2020-07-22 RX ORDER — MELATONIN
2000 DAILY
Status: DISCONTINUED | OUTPATIENT
Start: 2020-07-23 | End: 2020-07-27 | Stop reason: HOSPADM

## 2020-07-22 RX ADMIN — ENOXAPARIN SODIUM 40 MG: 40 INJECTION SUBCUTANEOUS at 21:34

## 2020-07-22 RX ADMIN — LISINOPRIL 20 MG: 20 TABLET ORAL at 21:36

## 2020-07-22 RX ADMIN — METHYLPREDNISOLONE SODIUM SUCCINATE 125 MG: 125 INJECTION, POWDER, FOR SOLUTION INTRAMUSCULAR; INTRAVENOUS at 13:06

## 2020-07-22 RX ADMIN — LEVOFLOXACIN 750 MG: 5 INJECTION INTRAVENOUS at 14:38

## 2020-07-22 RX ADMIN — PIPERACILLIN SODIUM AND TAZOBACTAM SODIUM 4.5 G: 4; .5 INJECTION, POWDER, LYOPHILIZED, FOR SOLUTION INTRAVENOUS at 17:06

## 2020-07-22 RX ADMIN — INSULIN LISPRO 12 UNITS: 100 INJECTION, SOLUTION INTRAVENOUS; SUBCUTANEOUS at 23:39

## 2020-07-22 RX ADMIN — METHYLPREDNISOLONE SODIUM SUCCINATE 60 MG: 40 INJECTION, POWDER, FOR SOLUTION INTRAMUSCULAR; INTRAVENOUS at 17:06

## 2020-07-22 RX ADMIN — ALBUTEROL SULFATE 2 PUFF: 108 AEROSOL, METERED RESPIRATORY (INHALATION) at 13:06

## 2020-07-22 RX ADMIN — MONTELUKAST 10 MG: 10 TABLET, FILM COATED ORAL at 21:35

## 2020-07-22 RX ADMIN — GUAIFENESIN 1200 MG: 600 TABLET, EXTENDED RELEASE ORAL at 21:35

## 2020-07-22 NOTE — PROGRESS NOTES
Patient is on isolation at the time of visit. Patient is not available to be assessed at this time.       [address], [address]   [phone]

## 2020-07-22 NOTE — ED TRIAGE NOTES
Pt reports recently discharged from [address] [place] [address] on 7/17/2021 states dx with COVID 19 and she also has COPD, pt reports that her breathing is not better.  Pt presents to ED with difficulty speaking in full sentences on 3 liters nasal cannula sats 94% on her own home o2

## 2020-07-22 NOTE — ED NOTES
Pt sitting up in stretcher, provided ginger ale per request. Pt reports feeling some relief of her symptoms after steroids and inhaler.

## 2020-07-22 NOTE — H&P
Admission History and Physical  [facility]      Patient: Debora Velasquez MRN: [id number]  [facility]: [id number]    YOB: 1960  Age: 60 y.o. Sex: female      Admission Date: 7/22/2020       HPI:     Debora Velasquez is a 60 y.o. female with PMH significant COPD on home O2, IDDM2, HTN, HFpEF, SHERRIE on CPAP, GERD, allergic rhinitis, recent Covid infection/acute on chronic hypoxic respiratory failure, now admitted with worsening dyspnea in the setting of recent covid infection. Patient was recently discharged from the [facility] for COVID pna superimposed on COPD exacerbation on Friday July 17th. Patient states that she was feeling well over the weekend overall, with no particular increase in dyspnea. On Monday, the patient noticed a significant increase in WOB, which worsened continually until today. She attempted to use duonebs every 4 hours, with no improvement. She had a telehealth visit today with her PCP, Dr. Tohr Crockett of [facility]. On his advice, she went to the ED. She is currently complaining of dyspnea, intermittent cough, fatigue, and lower extremity pain. She states that overall she feels poorly, feeling that the COVID has affected her heavily and continues to affect her, in particular her legs, and her breathing. She is concerned about her Right leg in particular, on her knee and inner distal thigh. ED Course (See objective for values/interpretations):  Labs obtained: COVID, PTT, LD, Phos, Mag, CMP, CBC,   Medications administered: Solumedrol, Zosyn, albuterol, levaquin  Imaging obtained: CXR    Review of Systems:  Review of Systems   Constitutional: Positive for malaise/fatigue. Negative for chills, fever and weight loss. Respiratory: Positive for cough, sputum production, shortness of breath (unable to speak full sentences) and wheezing. Negative for hemoptysis. Cardiovascular: Positive for leg swelling (b/l).  Negative for chest pain and palpitations. Gastrointestinal: Positive for abdominal pain. Negative for nausea and vomiting. Musculoskeletal: Positive for joint pain (knee) and myalgias (b/l leg pain). Neurological: Positive for weakness (d/t SOB). Negative for dizziness, tingling and loss of consciousness.      Past Medical History:   Diagnosis Date    Asthma     Chronic lung disease     COPD     Cystocele, midline     Diabetes mellitus (HCC)     GERD (gastroesophageal reflux disease)     Hidradenitis suppurativa     Hyperlipidemia     Hypertension     SHERRIE on CPAP     CPAP    Stress incontinence        Past Surgical History:   Procedure Laterality Date    BREAST SURGERY PROCEDURE UNLISTED      Right breast benign tumor removal    HX APPENDECTOMY      HX CHOLECYSTECTOMY      HX DILATION AND CURETTAGE      Dysfunctional uterine bleeding, thought 2/2 fibroids    HX TUBAL LIGATION         Family History   Problem Relation Age of Onset    Hypertension Mother     Stroke Mother     Breast Cancer Mother         Bilateral mastectomies    Cancer Mother         ovarian and breast    Heart Failure Mother     Heart Attack Father         2011    Heart Surgery Father         CABG    Heart Failure Father     COPD Sister         Heavy smoker    Cancer Sister         ovarian    Heart Failure Sister     Lung Disease Sister     Asthma Child     Cancer Maternal Aunt         pancreatic    Cancer Maternal Grandfather         stomach       Social History     Socioeconomic History    Marital status: LEGALLY      Spouse name: Not on file    Number of children: Not on file    Years of education: Not on file    Highest education level: Not on file   Tobacco Use    Smoking status: Former Smoker     Packs/day: 1.00     Years: 30.00     Pack years: 30.00     Types: Cigarettes     Start date: 1966     Last attempt to quit: 2006     Years since quittin.8    Smokeless tobacco: Former User    Tobacco comment: 1-1.5 packs per day   Substance and Sexual Activity    Alcohol use: No    Drug use: No    Sexual activity: Not Currently       Allergies   Allergen Reactions    Ancef [Cefazolin] Hives    Contrast Agent [Iodine] Anaphylaxis, Shortness of Breath and Swelling     Needs pre-medication for IV contrast with Benadryl, Solu-Medrol    Fish Containing Products Anaphylaxis     Pt states she had a severe allergic reaction at 7 y/o.  Statins-Hmg-Coa Reductase Inhibitors Myalgia    Metformin Other (comments)     Abdominal pain, diarrhea.  Codeine Other (comments)     Altered mental status       Prior to Admission Medications   Prescriptions Last Dose Informant Patient Reported? Taking? NOVOLOG FLEXPEN U-100 INSULIN 100 unit/mL inpn  Self No No   Sig: Continue home Sliding scale insulin as prior to admission   Patient taking differently: 1 Units by SubCUTAneous route three (3) times daily. If BG <100=0u  101-150=5u  151-250=8u  251-300=12u  >300 call MD     OXYGEN-AIR DELIVERY SYSTEMS   Yes No   Si L by Nasal route continuous. First Choice   albuterol (PROVENTIL HFA, VENTOLIN HFA, PROAIR HFA) 90 mcg/actuation inhaler   No No   Sig: Take 2 Puffs by inhalation every four (4) hours as needed for Wheezing. albuterol-ipratropium (DUO-NEB) 2.5 mg-0.5 mg/3 ml nebu   No No   Sig: 3 mL by Nebulization route every four (4) hours as needed for Wheezing. aspirin delayed-release 81 mg tablet   Yes No   Sig: Take 81 mg by mouth daily. azithromycin (ZITHROMAX) 250 mg tablet   No No   Sig: Take 1 Tab by mouth every Monday, Wednesday, Friday. Monday-Friday. fluticasone propion-salmeterol (ADVAIR HFA) 230-21 mcg/actuation inhaler   Yes No   Sig: Take 2 Puffs by inhalation two (2) times a day. fluticasone propionate (Flovent HFA) 220 mcg/actuation inhaler   No No   Sig: Take 1 Puff by inhalation two (2) times a day. furosemide (Lasix) 20 mg tablet   Yes No   Sig: Take 20 mg by mouth daily.    hydroCHLOROthiazide (HYDRODIURIL) 12.5 mg tablet   Yes No   Sig: Take 12.5 mg by mouth daily. insulin glargine (Basaglar KwikPen U-100 Insulin) 100 unit/mL (3 mL) inpn   Yes No   Si Units by SubCUTAneous route nightly. lactobacillus sp. 50 billion cpu (BIO-K PLUS) 50 billion cell -375 mg cap capsule   No No   Sig: Take 1 Cap by mouth daily. lisinopril (PRINIVIL, ZESTRIL) 20 mg tablet   Yes No   Sig: Take 20 mg by mouth two (2) times a day. loratadine (CLARITIN) 10 mg tablet   Yes No   Sig: Take 10 mg by mouth daily as needed for Allergies. montelukast (SINGULAIR) 10 mg tablet   Yes No   Sig: Take 10 mg by mouth nightly. omeprazole (PRILOSEC) 40 mg capsule   Yes No   Sig: Take  by mouth.   predniSONE (DELTASONE) 20 mg tablet   No No   Sig: Take 40 mg by mouth daily (with breakfast) for 7 days, THEN 20 mg daily (with breakfast) for 7 days. simethicone (GAS-X) 125 mg capsule   Yes No   Sig: Take 125 mg by mouth four (4) times daily as needed for Flatulence. tiotropium bromide (SPIRIVA RESPIMAT) 2.5 mcg/actuation inhaler   Yes No   Sig: Take 2 Puffs by inhalation daily. Facility-Administered Medications: None       Physical Exam:     Patient Vitals for the past 24 hrs:   Temp Pulse Resp BP SpO2   20 1400 -- 70 25 163/66 100 %   20 1345 -- 71 27 164/67 100 %   20 1330 -- 73 27 (!) 150/129 100 %   20 1315 -- 76 27 161/70 100 %   20 1300 -- 76 29 144/74 99 %   20 1245 -- 79 (!) 32 145/61 98 %   20 1230 -- 81 18 182/51 97 %   20 1210 97.7 °F (36.5 °C) 85 18 147/75 94 %       Physical Exam:   Physical Exam  Constitutional:       General: She is in acute distress (moderate distress d/t dyspnea). Appearance: She is obese. She is not toxic-appearing or diaphoretic. HENT:      Head: Normocephalic and atraumatic. Nose: Congestion present. Eyes:      Extraocular Movements: Extraocular movements intact. Pupils: Pupils are equal, round, and reactive to light. Neck:      Musculoskeletal: Normal range of motion. Cardiovascular:      Rate and Rhythm: Normal rate and regular rhythm. Heart sounds: No murmur. No friction rub. No gallop. Pulmonary:      Effort: Tachypnea, accessory muscle usage (with tripoding) and respiratory distress present. Breath sounds: Wheezing (throughout b/l) present. No decreased breath sounds, rhonchi or rales. Abdominal:      General: Abdomen is flat. Bowel sounds are normal.      Palpations: Abdomen is soft. Musculoskeletal:      Right knee: Normal.      Left knee: Normal.      Right upper leg: She exhibits tenderness (deep palpation medial aspect, no palpable cord). She exhibits no bony tenderness, no swelling, no edema, no deformity and no laceration. Right lower leg: She exhibits swelling. Left lower leg: Edema present. Skin:     Coloration: Skin is pale. Neurological:      General: No focal deficit present. Mental Status: She is alert and oriented to person, place, and time. Cranial Nerves: Cranial nerves are intact. Psychiatric:         Attention and Perception: Attention and perception normal.         Behavior: Behavior normal.       Chemistry Recent Labs     07/22/20  1224   *      K 4.2      CO2 30   BUN 16   CREA 0.83   CA 9.0   MG 1.7   PHOS 3.2   AGAP 5   BUCR 19   AP 66   TP 6.5   ALB 2.8*   GLOB 3.7   AGRAT 0.8        CBC w/Diff Recent Labs     07/22/20  1224   WBC 9.7   RBC 3.78*   HGB 11.6*   HCT 35.3      GRANS 75*   LYMPH 13*   EOS 1        Liver Enzymes Protein, total   Date Value Ref Range Status   07/22/2020 6.5 6.4 - 8.2 g/dL Final     Albumin   Date Value Ref Range Status   07/22/2020 2.8 (L) 3.4 - 5.0 g/dL Final     Globulin   Date Value Ref Range Status   07/22/2020 3.7 2.0 - 4.0 g/dL Final     A-G Ratio   Date Value Ref Range Status   07/22/2020 0.8 0.8 - 1.7   Final     Alk.  phosphatase   Date Value Ref Range Status   07/22/2020 66 45 - 117 U/L Final Recent Labs     07/22/20  1224   TP 6.5   ALB 2.8*   GLOB 3.7   AGRAT 0.8   AP 66        Lactic Acid Lactic acid   Date Value Ref Range Status   07/09/2020 1.2 0.4 - 2.0 MMOL/L Final     No results for input(s): LAC in the last 72 hours. BNP No results found for: BNP, BNPP, XBNPT     Cardiac Enzymes No results found for: CPK, CK, CKMMB, CKMB, RCK3, CKMBT, CKNDX, CKND1, KESHIA, TROPT, TROIQ, JOEL, TROPT, TNIPOC, BNP, BNPP     Coagulation Recent Labs     07/22/20  1224   PTP 12.8   INR 1.0         Thyroid  Lab Results   Component Value Date/Time    TSH 2.76 09/18/2016 02:20 PM          Lipid Panel Lab Results   Component Value Date/Time    Cholesterol, total 220 (H) 11/20/2012 03:22 AM    HDL Cholesterol 62 (H) 11/20/2012 03:22 AM    LDL, calculated 144.6 (H) 11/20/2012 03:22 AM    VLDL, calculated 13.4 11/20/2012 03:22 AM    Triglyceride 67 11/20/2012 03:22 AM    CHOL/HDL Ratio 3.5 11/20/2012 03:22 AM        ABG No results for input(s): PHI, PHI, POC2, PCO2I, PO2, PO2I, HCO3, HCO3I, FIO2, FIO2I in the last 72 hours. Urinalysis Lab Results   Component Value Date/Time    Color YELLOW 07/09/2020 07:16 PM    Appearance CLEAR 07/09/2020 07:16 PM    Specific gravity 1.011 07/09/2020 07:16 PM    pH (UA) 5.5 07/09/2020 07:16 PM    Protein Negative 07/09/2020 07:16 PM    Glucose 100 (A) 07/09/2020 07:16 PM    Ketone Negative 07/09/2020 07:16 PM    Bilirubin Negative 07/09/2020 07:16 PM    Urobilinogen 1.0 07/09/2020 07:16 PM    Nitrites Negative 07/09/2020 07:16 PM    Leukocyte Esterase TRACE (A) 07/09/2020 07:16 PM    Epithelial cells 2+ 07/09/2020 07:16 PM    Bacteria 1+ (A) 07/09/2020 07:16 PM    WBC 0 to 3 07/09/2020 07:16 PM    RBC Negative 05/24/2020 11:00 AM        Micro No results for input(s): SDES, CULT in the last 72 hours. No results for input(s): CULT in the last 72 hours. Imaging:  XR (Most Recent).  Results from [place] encounter on 07/22/20   XR CHEST PORT    Narrative AP CHEST, PORTABLE    INDICATION: Dyspnea. Positive for Covid 19    COMPARISON: Prior chest x-rays, most recent 7/12/2020    FINDINGS:  EKG leads overlie the patient. Cardiac silhouette is mildly enlarged. Atherosclerosis noted. Mild diffuse  interstitial prominence. Patchy opacities at the mid to lower lungs, with more  peripheral prominence. No definite pleural effusion or pneumothorax. No acute  osseous abnormalities are identified. Impression Impression:      1. Bilateral interstitial and airspace opacities, concerning for multifocal  infiltrate, including possibility of Covid 19 pneumonia. Superimposed edema not  excluded. CT (Most Recent) Results from Hospital Encounter encounter on 04/08/20   CT ABD PELV WO CONT    Narrative CT ABDOMEN AND PELVIS WITHOUT ENHANCEMENT    INDICATION: Nausea, right lower quadrant pain, vomiting since this morning. TECHNIQUE: Axial images obtained of the abdomen and pelvis without intravenous  contrast. Coronal and sagittal reformatted images of the abdomen and pelvis were  obtained. All CT scans at this facility are performed using dose optimization technique as  appropriate to a performed exam, to include automated exposure control,  adjustment of the mA and/or kV according to patient size (including appropriate  matching first site-specific examinations), or use of iterative reconstruction  technique. COMPARISON: 7/14/2019. Lack of intravenous contrast renders this study suboptimal for evaluating the  solid abdominal organs, vasculature and bowel. ABDOMEN FINDINGS:     Liver: Liver is 22.5 cm craniocaudal with mild decreased density. Spleen: Unremarkable. Pancreas: Unremarkable. Biliary: Cholecystectomy. Bowel: Mild diverticulosis. Similar small bowel in a periumbilical hernia. Peritoneum/ Retroperitoneum: Unremarkable. Lymph Nodes: Unremarkable. Adrenal Glands: Unremarkable. Kidneys: Unremarkable. Vessels:  Moderate atherosclerosis. Multifocal stenosis of the visceral ostia. PELVIS FINDINGS:     Bladder/ Pelvic Organs: Unremarkable. Lung Base: Severe coronary arteriosclerosis left coronary artery and LAD. Centrilobular emphysema. Subsegmental atelectasis at the lung bases. Bones/Soft tissues: Degenerative changes bilateral hips. Degenerative disc  disease. Impression IMPRESSION:    Unchanged small bowel containing periumbilical hernia without associated  obstruction. Hepatomegaly and mild steatosis. Mild diverticulosis. Moderate atherosclerosis with multifocal visceral ostia stenosis. Severe left coronary arteriosclerosis. Emphysema. ECHO No results found for this or any previous visit. EKG No results found for this or any previous visit. Recent Results (from the past 12 hour(s))   CBC WITH AUTOMATED DIFF    Collection Time: 07/22/20 12:24 PM   Result Value Ref Range    WBC 9.7 4.6 - 13.2 K/uL    RBC 3.78 (L) 4.20 - 5.30 M/uL    HGB 11.6 (L) 12.0 - 16.0 g/dL    HCT 35.3 35.0 - 45.0 %    MCV 93.4 74.0 - 97.0 FL    MCH 30.7 24.0 - 34.0 PG    MCHC 32.9 31.0 - 37.0 g/dL    RDW 14.0 11.6 - 14.5 %    PLATELET 001 107 - 021 K/uL    MPV 9.4 9.2 - 11.8 FL    NEUTROPHILS 75 (H) 40 - 73 %    LYMPHOCYTES 13 (L) 21 - 52 %    MONOCYTES 11 (H) 3 - 10 %    EOSINOPHILS 1 0 - 5 %    BASOPHILS 0 0 - 2 %    ABS. NEUTROPHILS 7.3 1.8 - 8.0 K/UL    ABS. LYMPHOCYTES 1.3 0.9 - 3.6 K/UL    ABS. MONOCYTES 1.1 0.05 - 1.2 K/UL    ABS. EOSINOPHILS 0.1 0.0 - 0.4 K/UL    ABS.  BASOPHILS 0.0 0.0 - 0.1 K/UL    DF AUTOMATED     METABOLIC PANEL, COMPREHENSIVE    Collection Time: 07/22/20 12:24 PM   Result Value Ref Range    Sodium 140 136 - 145 mmol/L    Potassium 4.2 3.5 - 5.5 mmol/L    Chloride 105 100 - 111 mmol/L    CO2 30 21 - 32 mmol/L    Anion gap 5 3.0 - 18 mmol/L    Glucose 240 (H) 74 - 99 mg/dL    BUN 16 7.0 - 18 MG/DL    Creatinine 0.83 0.6 - 1.3 MG/DL    BUN/Creatinine ratio 19 12 - 20      GFR est AA >60 >60 ml/min/1.73m2    GFR est non-AA >60 >60 ml/min/1.73m2    Calcium 9.0 8.5 - 10.1 MG/DL    Bilirubin, total 0.5 0.2 - 1.0 MG/DL    ALT (SGPT) 47 13 - 56 U/L    AST (SGOT) 35 10 - 38 U/L    Alk. phosphatase 66 45 - 117 U/L    Protein, total 6.5 6.4 - 8.2 g/dL    Albumin 2.8 (L) 3.4 - 5.0 g/dL    Globulin 3.7 2.0 - 4.0 g/dL    A-G Ratio 0.8 0.8 - 1.7     MAGNESIUM    Collection Time: 07/22/20 12:24 PM   Result Value Ref Range    Magnesium 1.7 1.6 - 2.6 mg/dL   PHOSPHORUS    Collection Time: 07/22/20 12:24 PM   Result Value Ref Range    Phosphorus 3.2 2.5 - 4.9 MG/DL   PROTHROMBIN TIME + INR    Collection Time: 07/22/20 12:24 PM   Result Value Ref Range    Prothrombin time 12.8 11.5 - 15.2 sec    INR 1.0 0.8 - 1.2     EKG, 12 LEAD, INITIAL    Collection Time: 07/22/20 12:34 PM   Result Value Ref Range    Ventricular Rate 80 BPM    Atrial Rate 80 BPM    P-R Interval 142 ms    QRS Duration 88 ms    Q-T Interval 378 ms    QTC Calculation (Bezet) 435 ms    Calculated P Axis 22 degrees    Calculated R Axis 0 degrees    Calculated T Axis 40 degrees    Diagnosis       Normal sinus rhythm  Cannot rule out Anterior infarct , age undetermined  Abnormal ECG  When compared with ECG of 09-JUL-2020 15:17,  No significant change was found         Assessment/Plan:   60 y.o. female with PMH significant COPD on home O2, IDDM2, HTN, HFpEF, SHERRIE on CPAP, GERD, allergic rhinitis, recent Covid infection/acute on chronic hypoxic respiratory failure, now admitted with worsening dyspnea in the setting of recent covid infection. Acute on chronic hypoxic respiratory failure likely 2/2 Covid, in the setting of chronic COPD/asthma overlapping syndrome, HFpEF. : DDx can include COPD exacerbation, COVID pneumonia, fluid overload/CHF exacerbation. Pt w/ positive covid swab (7/7). Pt with severe underlying lung disease, Severe COPD on max therapy w/ strong asthma component. Hospitalized 5x in 2020 for COPD exacerbations.  Recently DCd for COVID, from the service. Felt better, but has had worsening respiratory for several days now. - Admit to COVID unit with telemetry monitoring  - Consult Pulm  - Abx, zosyn  - Daily CBC, BMP, COVID labs/coags  - ABG now  - Imaging CXR daily  - lovenox COVID VTE prophylaxis (BID 40mg)  - solumedrol 60mg q12h per pulm rec  - Oxygen with goal of 88-92%. - Vital signs per unit routine  - Monitor I/Os  - Ambulate as tolerated  - Replete electrolytes daily  - PT/OT/CM    B/l leg swelling, pain, in setting of COVID positive patient.  -negative physical exam for DVT  -b/l PVLs  -on lovenox prophylaxis dose, increase if DVT to treatment dose.      Insulin dependent Type 2 Diabete: Home lantus 45u pm and novolog SSI. -stay at Lantus at 45 U.  -SSI   -Accuchecks ACHS  -Diabetic diet  -Diabetic educator consult  -expect further labile BS due to steroid use     Hypertension, HFpEF: ECHO (5/24/20) XE 92 - 70%. No regional wall motion abnormality. Moderate (grade 2) left ventricular diastolic dysfunction. Mildly dilated left atrium. Pulmonary hypertension. Pulmonary arterial systolic pressure is 61 mmHg. Severely elevated central venous pressure (15+ mmHg); IVC diameter is larger than 21 mm and collapses less than 50% with respiration. SBP in ED elevated  to 140s-170s.  Pt on lisinopril 20mg BID and HCTZ 12.5mg daily at home. Patient endorsing increased swelling in Abdomen and legs, feels bloated.      - Continue Lasix 20mg daily  - Continue Lisinopril 20mg BID  - Continue HCTZ 12.5 mg daily     GERD: Pt takes omeprazole 40mg daily and pepcid for breakthrough symptoms at home.   -Continue protonix 40mg daily     Morbid obesity  -Diabetic diet     Diet Diabetic/cardiac diet   DVT Prophylaxis Lovenox   GI Prophylaxis Protonix   Code status Full   Disposition >2 nights     Point of Contact [address]  Relationship: Daughter  [phone]      Best Lambert MD , PGY-1   [address]   Intern Pager: [phone] July 22, 2020, 7:00 PM                    Admission History and Physical  [facility]     Patient: Arnulfo Ruth MRN: [id number]  [facility]: [id number]    YOB: 1960  Age: 60 y.o. Sex: female       Admission Date: 7/22/2020     HPI:      Arnulfo Ruth is a 60 y.o. female with PMH  chronic hypoxic/hypercapnic respiratory failure (on 2-3L home O2) 2/2 COPD/asthma overlap syndrome with chronic steroid dependence, morbid obesity, obesity hypoventilation syndrome with SHERRIE on CPAP, chronic dCHF, HTN/HLD, bilateral carotid bruits, IDDM2, GERD, allergic rhinitis now presenting with complaint of dyspnea. Patient well known to this service and was discharged after admission for COVID PNA on Friday, July 12th. Patient seen/examined in ED14 with full droplet plus precautions. Patient states she was initially feeling well over the weekend. On Monday, she began to have worsening dyspnea and started to use her duoNeb treatments more regularly. She says she should have come to the hospital yesterday but held out as she had a telehealth appointment with Dr. Lisa Loya of [facility] this morning. On advice of her PCP office, she presented to [facility]. She has complaints of dyspnea, productive cough, fatigue and b/l lower extremity pain. Patient states that she feels like she got hit by a train and says the flu is nothing compared to the amount of body aches she has had with her COVID illness and it is still affecting her legs, particularly her R knee. Patient is most bothered by an intermittent cough that causes both dyspnea and chest wall tenderness. She describes her sputum as dark brown in color and similar in appearance to the color of her sputum when she used to be a smoker. She has no additional complaints. She was taking her prednisone 40 mg daily as prescribed.      ED Course (See objective/plan for values/interpretations):  Labs obtained: CBC, CMP, LD, PT/INR, type & screen  Medications administered: Levaquin  Imaging obtained: CXR, EKG     Review of Systems   Constitutional: Positive for malaise/fatigue. Negative for chills, diaphoresis and fever. HENT: Negative for congestion and sore throat. Eyes: Negative for blurred vision and double vision. Respiratory: Positive for cough, sputum production, shortness of breath and wheezing. Negative for hemoptysis. Cardiovascular: Positive for leg swelling (b/l pedal). Negative for chest pain (chest wall pain w/coughing) and orthopnea. Gastrointestinal: Negative for abdominal pain, constipation, diarrhea (resolved from when she had covid), heartburn, nausea and vomiting. Genitourinary: Negative for dysuria and hematuria. Musculoskeletal: Positive for joint pain (R knee) and myalgias. Negative for back pain and neck pain. Skin: Negative for rash. Neurological: Negative for dizziness, sensory change, focal weakness, weakness and headaches (resolved from when she had covid). Psychiatric/Behavioral: Negative for hallucinations. The patient is not nervous/anxious.             Past Medical History:   Diagnosis Date    Asthma      Chronic lung disease      COPD      Cystocele, midline      Diabetes mellitus ([facility] [address]      GERD (gastroesophageal reflux disease)      Hidradenitis suppurativa      Hyperlipidemia      Hypertension      SHERRIE on CPAP       CPAP    Stress incontinence                Past Surgical History:   Procedure Laterality Date    BREAST SURGERY PROCEDURE UNLISTED         Right breast benign tumor removal    HX APPENDECTOMY        HX CHOLECYSTECTOMY        HX DILATION AND CURETTAGE         Dysfunctional uterine bleeding, thought 2/2 fibroids    HX TUBAL LIGATION            Family History   Problem Relation Age of Onset    Hypertension Mother      Stroke Mother      Breast Cancer Mother           Bilateral mastectomies    Cancer Mother           ovarian and breast    Heart Failure Mother      Heart Attack Father           2011    Heart Surgery Father           CABG    Heart Failure Father      COPD Sister           Heavy smoker    Cancer Sister           ovarian    Heart Failure Sister      Lung Disease Sister      Asthma Child      Cancer Maternal Aunt           pancreatic    Cancer Maternal Grandfather           stomach        Social History               Socioeconomic History    Marital status: LEGALLY        Spouse name: Not on file    Number of children: Not on file    Years of education: Not on file    Highest education level: Not on file   Tobacco Use    Smoking status: Former Smoker       Packs/day: 1.00       Years: 30.00       Pack years: 30.00       Types: Cigarettes       Start date: 1966       Last attempt to quit: 2006       Years since quittin.8    Smokeless tobacco: Former User    Tobacco comment: 1-1.5 packs per day   Substance and Sexual Activity    Alcohol use: No    Drug use: No    Sexual activity: Not Currently                 Allergies   Allergen Reactions    Ancef [Cefazolin] Hives    Contrast Agent [Iodine] Anaphylaxis, Shortness of Breath and Swelling       Needs pre-medication for IV contrast with Benadryl, Solu-Medrol    Fish Containing Products Anaphylaxis       Pt states she had a severe allergic reaction at 7 y/o.  Statins-Hmg-Coa Reductase Inhibitors Myalgia    Metformin Other (comments)       Abdominal pain, diarrhea.  Codeine Other (comments)       Altered mental status        Prior to Admission Medications   Prescriptions Last Dose Informant Patient Reported? Taking? NOVOLOG FLEXPEN U-100 INSULIN 100 unit/mL inpn   Self No No   Sig: Continue home Sliding scale insulin as prior to admission   Patient taking differently: 1 Units by SubCUTAneous route three (3) times daily.  If BG <100=0u  101-150=5u  151-250=8u  251-300=12u  >300 call MD      OXYGEN-AIR DELIVERY SYSTEMS     Yes No   Si L by Nasal route continuous. [facility]   albuterol (PROVENTIL HFA, VENTOLIN HFA, PROAIR HFA) 90 mcg/actuation inhaler     No No   Sig: Take 2 Puffs by inhalation every four (4) hours as needed for Wheezing. albuterol-ipratropium (DUO-NEB) 2.5 mg-0.5 mg/3 ml nebu     No No   Sig: 3 mL by Nebulization route every four (4) hours as needed for Wheezing. aspirin delayed-release 81 mg tablet     Yes No   Sig: Take 81 mg by mouth daily. azithromycin (ZITHROMAX) 250 mg tablet     No No   Sig: Take 1 Tab by mouth every Monday, Wednesday, Friday. Monday-Friday. fluticasone propion-salmeterol (ADVAIR HFA) 230-21 mcg/actuation inhaler     Yes No   Sig: Take 2 Puffs by inhalation two (2) times a day. fluticasone propionate (Flovent HFA) 220 mcg/actuation inhaler     No No   Sig: Take 1 Puff by inhalation two (2) times a day. furosemide (Lasix) 20 mg tablet     Yes No   Sig: Take 20 mg by mouth daily. hydroCHLOROthiazide (HYDRODIURIL) 12.5 mg tablet     Yes No   Sig: Take 12.5 mg by mouth daily. insulin glargine (Basaglar KwikPen U-100 Insulin) 100 unit/mL (3 mL) inpn     Yes No   Si Units by SubCUTAneous route nightly. lactobacillus sp. 50 billion cpu (BIO-K PLUS) 50 billion cell -375 mg cap capsule     No No   Sig: Take 1 Cap by mouth daily. lisinopril (PRINIVIL, ZESTRIL) 20 mg tablet     Yes No   Sig: Take 20 mg by mouth two (2) times a day. loratadine (CLARITIN) 10 mg tablet     Yes No   Sig: Take 10 mg by mouth daily as needed for Allergies. montelukast (SINGULAIR) 10 mg tablet     Yes No   Sig: Take 10 mg by mouth nightly. omeprazole (PRILOSEC) 40 mg capsule     Yes No   Sig: Take  by mouth.   predniSONE (DELTASONE) 20 mg tablet     No No   Sig: Take 40 mg by mouth daily (with breakfast) for 7 days, THEN 20 mg daily (with breakfast) for 7 days. simethicone (GAS-X) 125 mg capsule     Yes No   Sig: Take 125 mg by mouth four (4) times daily as needed for Flatulence.    tiotropium bromide (SPIRIVA RESPIMAT) 2.5 mcg/actuation inhaler     Yes No   Sig: Take 2 Puffs by inhalation daily. Facility-Administered Medications: None         Physical Exam:      Patient Vitals for the past 24 hrs:    Temp Pulse Resp BP SpO2   07/22/20 1400 -- 70 25 163/66 100 %   07/22/20 1345 -- 71 27 164/67 100 %   07/22/20 1330 -- 73 27 (!) 150/129 100 %   07/22/20 1315 -- 76 27 161/70 100 %   07/22/20 1300 -- 76 29 144/74 99 %   07/22/20 1245 -- 79 (!) 32 145/61 98 %   07/22/20 1230 -- 81 18 182/51 97 %   07/22/20 1210 97.7 °F (36.5 °C) 85 18 147/75 94 %     Physical Exam:   General:  AAOx3, uncomfortable in appearance, morbidly obese  female, appears stated age  [de-identified]: Conjunctiva pink, sclera anicteric. PERRL. EOMI. Pharynx moist, nonerythematous. Moist mucous membranes. No oropharyngeal edema/erythema. Thyroid not enlarged, no nodules. No cervical, supraclavicular, occipital or submandibular lymphadenopathy. No other gross abnormalities present. CV:  RRR, no murmurs. No visible pulsations or thrills. RESP:  Quite dyspneic and breathless, gasps between 2-3 word sentences. Diffuse bilateral crackles with inspiratory and expiratory wheezing. ABD:  Soft, nontender, nondistended. No hepatosplenomegaly. No suprapubic tenderness. RECTAL:  Deferred  MS:  No joint deformity or instability. No atrophy. Neuro:  CN II-XII grossly intact. 5/5 strength bilateral upper extremities and lower extremities. Ext:  B/L pedal edema L>R. Negative messi sign b/l and no circumferential calf difference. R knee w/o effusion and no joint line tenderness. Full A/PROM. 2+ radial and dp pulses bilaterally. Distal extremities not cold to touch   Skin:  Plethoric. No rashes, lesions, or ulcers. Good turgor.      Chemistry     Recent Labs     07/22/20  1224   *      K 4.2      CO2 30   BUN 16   CREA 0.83   CA 9.0   MG 1.7   PHOS 3.2   AGAP 5   BUCR 19   AP 66   TP 6.5   ALB 2.8*   GLOB 3.7   AGRAT 0.8         CBC w/Diff Recent Labs     07/22/20  1224   WBC 9.7   RBC 3.78*   HGB 11.6*   HCT 35.3      GRANS 75*   LYMPH 13*   EOS 1         Liver Enzymes       Protein, total   Date Value Ref Range Status   07/22/2020 6.5 6.4 - 8.2 g/dL Final      Albumin   Date Value Ref Range Status   07/22/2020 2.8 (L) 3.4 - 5.0 g/dL Final            Globulin   Date Value Ref Range Status   07/22/2020 3.7 2.0 - 4.0 g/dL Final            A-G Ratio   Date Value Ref Range Status   07/22/2020 0.8 0.8 - 1.7   Final            Alk. phosphatase   Date Value Ref Range Status   07/22/2020 66 45 - 117 U/L Final         Recent Labs     07/22/20  1224   TP 6.5   ALB 2.8*   GLOB 3.7   AGRAT 0.8   AP 66         Coagulation     Recent Labs     07/22/20  1224   PTP 12.8   INR 1.0          ABG Pending      Micro Sputum cx pending         Imaging:  XR (Most Recent). Results from [place] encounter on 07/22/20   XR CHEST PORT     Narrative AP CHEST, PORTABLE     INDICATION: Dyspnea. Positive for Covid 19     COMPARISON: Prior chest x-rays, most recent 7/12/2020     FINDINGS:  EKG leads overlie the patient. Cardiac silhouette is mildly enlarged. Atherosclerosis noted. Mild diffuse  interstitial prominence. Patchy opacities at the mid to lower lungs, with more  peripheral prominence. No definite pleural effusion or pneumothorax. No acute  osseous abnormalities are identified. Impression Impression:      1. Bilateral interstitial and airspace opacities, concerning for multifocal  infiltrate, including possibility of Covid 19 pneumonia. Superimposed edema not  excluded. ECHO      05/21/20   ECHO ADULT COMPLETE 05/25/2020 5/25/2020     Narrative · Image quality for this study was technically difficult. Contrast used:   DEFINITY. · Normal cavity size and systolic function (ejection fraction normal). Moderate concentric hypertrophy. Estimated left ventricular ejection   fraction is 65 - 70%.  Visually measured ejection fraction. No regional   wall motion abnormality noted. Moderate (grade 2) left ventricular   diastolic dysfunction. · Mildly dilated left atrium. · Pulmonary hypertension. Pulmonary arterial systolic pressure is 61 mmHg. · Severely elevated central venous pressure (15+ mmHg); IVC diameter is   larger than 21 mm and collapses less than 50% with respiration. Signed by: Felipe Talley MD             EKG         EKG Results      Procedure 720 Value Units Date/Time     EKG, 12 LEAD, INITIAL [[id number]] Collected:  07/22/20 1234     Order Status:  Completed Updated:  07/22/20 1250       Ventricular Rate 80 BPM         Atrial Rate 80 BPM         P-R Interval 142 ms         QRS Duration 88 ms         Q-T Interval 378 ms         QTC Calculation (Bezet) 435 ms         Calculated P Axis 22 degrees         Calculated R Axis 0 degrees         Calculated T Axis 40 degrees         Diagnosis --       Normal sinus rhythm  Cannot rule out Anterior infarct , age undetermined  Abnormal ECG  When compared with ECG of 09-JUL-2020 15:17,  No significant change was found                   Recent Results          Recent Results (from the past 12 hour(s))   CBC WITH AUTOMATED DIFF     Collection Time: 07/22/20 12:24 PM   Result Value Ref Range     WBC 9.7 4.6 - 13.2 K/uL     RBC 3.78 (L) 4.20 - 5.30 M/uL     HGB 11.6 (L) 12.0 - 16.0 g/dL     HCT 35.3 35.0 - 45.0 %     MCV 93.4 74.0 - 97.0 FL     MCH 30.7 24.0 - 34.0 PG     MCHC 32.9 31.0 - 37.0 g/dL     RDW 14.0 11.6 - 14.5 %     PLATELET 909 229 - 547 K/uL     MPV 9.4 9.2 - 11.8 FL     NEUTROPHILS 75 (H) 40 - 73 %     LYMPHOCYTES 13 (L) 21 - 52 %     MONOCYTES 11 (H) 3 - 10 %     EOSINOPHILS 1 0 - 5 %     BASOPHILS 0 0 - 2 %     ABS. NEUTROPHILS 7.3 1.8 - 8.0 K/UL     ABS. LYMPHOCYTES 1.3 0.9 - 3.6 K/UL     ABS. MONOCYTES 1.1 0.05 - 1.2 K/UL     ABS. EOSINOPHILS 0.1 0.0 - 0.4 K/UL     ABS.  BASOPHILS 0.0 0.0 - 0.1 K/UL     DF AUTOMATED     METABOLIC PANEL, COMPREHENSIVE     Collection Time: 07/22/20 12:24 PM   Result Value Ref Range     Sodium 140 136 - 145 mmol/L     Potassium 4.2 3.5 - 5.5 mmol/L     Chloride 105 100 - 111 mmol/L     CO2 30 21 - 32 mmol/L     Anion gap 5 3.0 - 18 mmol/L     Glucose 240 (H) 74 - 99 mg/dL     BUN 16 7.0 - 18 MG/DL     Creatinine 0.83 0.6 - 1.3 MG/DL     BUN/Creatinine ratio 19 12 - 20       GFR est AA >60 >60 ml/min/1.73m2     GFR est non-AA >60 >60 ml/min/1.73m2     Calcium 9.0 8.5 - 10.1 MG/DL     Bilirubin, total 0.5 0.2 - 1.0 MG/DL     ALT (SGPT) 47 13 - 56 U/L     AST (SGOT) 35 10 - 38 U/L     Alk.  phosphatase 66 45 - 117 U/L     Protein, total 6.5 6.4 - 8.2 g/dL     Albumin 2.8 (L) 3.4 - 5.0 g/dL     Globulin 3.7 2.0 - 4.0 g/dL     A-G Ratio 0.8 0.8 - 1.7     MAGNESIUM     Collection Time: 07/22/20 12:24 PM   Result Value Ref Range     Magnesium 1.7 1.6 - 2.6 mg/dL   PHOSPHORUS     Collection Time: 07/22/20 12:24 PM   Result Value Ref Range     Phosphorus 3.2 2.5 - 4.9 MG/DL   PROTHROMBIN TIME + INR     Collection Time: 07/22/20 12:24 PM   Result Value Ref Range     Prothrombin time 12.8 11.5 - 15.2 sec     INR 1.0 0.8 - 1.2     EKG, 12 LEAD, INITIAL     Collection Time: 07/22/20 12:34 PM   Result Value Ref Range     Ventricular Rate 80 BPM     Atrial Rate 80 BPM     P-R Interval 142 ms     QRS Duration 88 ms     Q-T Interval 378 ms     QTC Calculation (Bezet) 435 ms     Calculated P Axis 22 degrees     Calculated R Axis 0 degrees     Calculated T Axis 40 degrees     Diagnosis           Normal sinus rhythm  Cannot rule out Anterior infarct , age undetermined  Abnormal ECG  When compared with ECG of 09-JUL-2020 15:17,  No significant change was found              Assessment/Plan:   60 y.o. female with PMH chronic hypoxic/hypercapnic respiratory failure (on 2-3L home O2) 2/2 COPD/asthma overlap syndrome with chronic steroid dependence, morbid obesity, obesity hypoventilation syndrome with SHERRIE on CPAP, chronic dCHF, HTN/HLD, bilateral carotid bruits, IDDM2, GERD, allergic rhinitis now admitted with air hunger. 1. Acute hypoxic respiratory failure 2/2 Acute/Chronic exacerbation of COPD/asthma overlap syndrome in the context of recent COVID19 PNA superimposed on h/o poorly controlled severe persistent asthma with steroid dependence and COPD, gold risk category D. DDx to include COPD exacerbation, persistent deconditioning 2/2 recent COVID19 PNA, HCAP, CHF exacerbation. Hypoxic respiratory failure as defined by air hunger, tachypnea, increased oxygen need above home O2 requirements. Of note, patient recently admitted 07/09/2020 - 07/21/2020 with A/C hypoxic respiratory failure 2/2 COVID19 and has had 5-6 hospitalization for COPD in 2020 so far. Patient discharged on 07/17 and began to have respiratory decline on 07/20. Patient referred by PCP office for increased O2 needs. Failed outpatient regimen of daily prednisone, duonebs q4h, home O2. On arrival to [facility], patient's SpO2 94% on home O2 levels and currently saturating 100% on 6L via NC. Also with tachypnea, elevated BP and no fever. No leukocytosis with mild left shift. Chemistries largely unremarkable aside from elevated glucose, hypoalbuminemia and LD to 426. Comparing today's CXR to 07/12, persistent bilateral opacities (previously bibasilar) c/w multifocal PNA and possibly increased interstitial lung markings. EKG NSR. No additional imaging or labs. Patient was started on levofloxacin and put on droplet plus precautions. Clinical picture not c/w sepsis. Possibly multifactorial although do not suspect acute dCHF exacerbation as patient only has mild b/l pedal edema. Patient to be admitted and will ask for pulmonary medicine's assistance. Patient's primary pulmonologist is Dr. Karo Bartlett.   - Admit to medical with telemetry monitoring  - Consult pulmonary medicine, recommendations appreciated  - Hold off IVFs at this time  - Abx, ie zosyn 4.5g q6h and discontinue levofloxacin  - Supplemental O2 to maintain PO2 88-92%  - Discussed with Dr. Loreto Grande, will escalate steroids to Solu-Medrol q12h and reevaluate response   - Hold home prednisone 40 mg daily   - consider vitamin supplementation, ie vitC 500mg twice daily, zinc sulfate 1mg daily, vit D 2000u daily   - lovenox 40 mg q12h  - DuoNebs q4h prn  - Albuterol MDI q2h prn  - Continue home loratadine 10 mg daily   - Continue home singulair 10 mg po daily   - Daily CBC, BMP, mag, phos  - Add on procalcitonin, NT pro-BNP, ABG  - Repeat COVID test  - Trend COVID labs every other day, ie ferritin, fibrinogen, LD, d dimer, CRP  - Trend WBC  - Imaging: every other day CXR  - Vital signs per unit routine  - Monitor I/Os  - Ambulate TID with assistance  - Replete electrolytes daily  - PT/OT/CM when appropriate     2. Subacute cough. 2/2 #1  - Sputum culture, if patient can produce a sample  - Guaifenesin 1200 mg q12h  - Benzonatate 100 mg TID prn cough  - Imaging as above      3. ?New onset b/l pedal edema. H/O chronic dCHF. H/O pHTN. Negative Homans sign. No erythema/edema of calves. Normal K+ and mag. Recent echo reviewed from 05/2020 significant for moderate concentric hypertrophy and grade 2 left ventricular diastolic dysfunction. Pulmonary arterial systolic pressure 61 mmHg  - B/L PVLs  - Anticoagulation as above  - Continue home diuresis, ie lasix 20 mg qAM  - Consider R knee 3V xray if pain persists     4. Hyperglycemia. H/O Type 2 Diabetes Mellitus. Most recent A1c 8.4 05/22/2020. Home lantus regimen is 45 units qPM and novolog sliding scale. - Accuchecks achs with sliding scale insulin  - Continue home Lantus 45U qhs  - Diabetic education consult  - Consistent carbohydrate diet  - Hemoglobin A1c     5. H/O HTN/HLD.  - Vital signs per unit routine  - Continue bASA daily   - Continue home lisinopril 20 mg BID  - Continue home HCTZ 12.5 mg daily      6. H/O GERD  - Will substitute home omeprazole 40 mg for formulary alternative      7.  H/O SHERRIE on CPAP.   - BiPAP qhs in place of home trilogy      8. H/O Morbid obesity.   - Consider nutrition consultation     For additional problem list, assessment, and plan please see intern note.      Diet  Diabetic diet   DVT Prophylaxis  Lovenox   GI Prophylaxis  Protonix   Code status  FULL CODE   Disposition  Anticipate LOS>2MN to home w/HH      Cisco Powers DO , PGY-3   [facility]   Senior Pager: [phone]   July 22, 2020, 3:25 PM

## 2020-07-22 NOTE — ED PROVIDER NOTES
HPI Patient with PMHx of COPD, diabetes, hypertension, hyperlipidemia, obstructive sleep apnea on CPAP sent in by Dr. Crystal Flores family practice resident who reports that he had a telemedicine visit with the patient this morning and she reported worsening shortness of breath. Patient was admitted to this hospital and discharged 4 days ago. She was positive for COVID-19 virus. Patient reported chest tightness and also stated she had to increase her oxygen from 2 L to 3 L. She also states that she has been giving herself duo nebs every 4 hours without any resolution of her shortness of breath.     Past Medical History:   Diagnosis Date    Asthma     Chronic lung disease     COPD     Cystocele, midline     Diabetes mellitus (HCC)     GERD (gastroesophageal reflux disease)     Hidradenitis suppurativa     Hyperlipidemia     Hypertension     SHERRIE on CPAP     CPAP    Stress incontinence        Past Surgical History:   Procedure Laterality Date    BREAST SURGERY PROCEDURE UNLISTED      Right breast benign tumor removal    HX APPENDECTOMY      HX CHOLECYSTECTOMY      HX DILATION AND CURETTAGE      Dysfunctional uterine bleeding, thought 2/2 fibroids    HX TUBAL LIGATION           Family History:   Problem Relation Age of Onset    Hypertension Mother     Stroke Mother     Breast Cancer Mother         Bilateral mastectomies    Cancer Mother         ovarian and breast    Heart Failure Mother     Heart Attack Father         2011    Heart Surgery Father         CABG    Heart Failure Father     COPD Sister         Heavy smoker    Cancer Sister         ovarian    Heart Failure Sister     Lung Disease Sister     Asthma Child     Cancer Maternal Aunt         pancreatic    Cancer Maternal Grandfather         stomach       Social History     Socioeconomic History    Marital status: LEGALLY      Spouse name: Not on file    Number of children: Not on file    Years of education: Not on file  Highest education level: Not on file   Occupational History    Not on file   Social Needs    Financial resource strain: Not on file    Food insecurity     Worry: Not on file     Inability: Not on file    Transportation needs     Medical: Not on file     Non-medical: Not on file   Tobacco Use    Smoking status: Former Smoker     Packs/day: 1.00     Years: 30.00     Pack years: 30.00     Types: Cigarettes     Start date: 1966     Last attempt to quit: 2006     Years since quittin.8    Smokeless tobacco: Former User    Tobacco comment: 1-1.5 packs per day   Substance and Sexual Activity    Alcohol use: No    Drug use: No    Sexual activity: Not Currently   Lifestyle    Physical activity     Days per week: Not on file     Minutes per session: Not on file    Stress: Not on file   Relationships    Social connections     Talks on phone: Not on file     Gets together: Not on file     Attends [employer] service: Not on file     Active member of club or organization: Not on file     Attends meetings of clubs or organizations: Not on file     Relationship status: Not on file    Intimate partner violence     Fear of current or ex partner: Not on file     Emotionally abused: Not on file     Physically abused: Not on file     Forced sexual activity: Not on file   Other Topics Concern    Not on file   Social History Narrative    Not on file         ALLERGIES: Ancef [cefazolin]; Contrast agent [iodine]; Fish containing products; Statins-hmg-coa reductase inhibitors; Metformin; and Codeine    Review of Systems   Constitutional: Negative for chills, fatigue and fever. HENT: Positive for rhinorrhea. Negative for nosebleeds, sore throat, tinnitus, trouble swallowing and voice change. Eyes: Negative for photophobia, pain, discharge, redness and visual disturbance. Respiratory: Positive for chest tightness. Negative for cough, shortness of breath, wheezing and stridor.     Cardiovascular: Negative for chest pain, palpitations and leg swelling. Gastrointestinal: Negative for abdominal distention, constipation, diarrhea, nausea and vomiting. Endocrine: Negative for polydipsia, polyphagia and polyuria. Genitourinary: Negative for difficulty urinating, dyspareunia, flank pain, hematuria, pelvic pain, urgency, vaginal bleeding and vaginal discharge. Musculoskeletal: Negative for arthralgias, joint swelling and myalgias. Skin: Negative for color change, pallor and rash. Allergic/Immunologic: Negative for immunocompromised state. Neurological: Negative for dizziness, seizures, speech difficulty, weakness and headaches. Hematological: Negative for adenopathy. Does not bruise/bleed easily. Psychiatric/Behavioral: Negative for agitation and hallucinations. The patient is not hyperactive. Vitals:    07/22/20 1315 07/22/20 1330 07/22/20 1345 07/22/20 1400   BP: 161/70 (!) 150/129 164/67 163/66   Pulse: 76 73 71 70   Resp: 27 27 27 25   Temp:       SpO2: 100% 100% 100% 100%   Weight:                Physical Exam  Vitals signs and nursing note reviewed. Constitutional:       General: She is in acute distress (moderate distress). Appearance: She is well-developed. She is obese. She is not diaphoretic. HENT:      Head: Normocephalic and atraumatic. Right Ear: External ear normal.      Left Ear: External ear normal.      Nose: Nose normal.      Mouth/Throat:      Pharynx: No oropharyngeal exudate. Eyes:      General: No scleral icterus. Right eye: No discharge. Left eye: No discharge. Conjunctiva/sclera: Conjunctivae normal.      Pupils: Pupils are equal, round, and reactive to light. Neck:      Thyroid: No thyromegaly. Vascular: No JVD. Trachea: No tracheal deviation. Cardiovascular:      Rate and Rhythm: Normal rate and regular rhythm. Heart sounds: Normal heart sounds. No murmur. No friction rub. No gallop.     Pulmonary:      Effort: Tachypnea present. No respiratory distress. Breath sounds: No stridor. Examination of the right-lower field reveals wheezing. Examination of the left-lower field reveals wheezing. Wheezing present. No rales. Chest:      Chest wall: No tenderness. Abdominal:      General: Bowel sounds are normal. There is no distension. Palpations: Abdomen is soft. There is no mass. Tenderness: There is no abdominal tenderness. There is no guarding or rebound. Genitourinary:     Vagina: Normal. No vaginal discharge. Musculoskeletal: Normal range of motion. General: No tenderness. Right lower leg: She exhibits no tenderness. No edema. Left lower leg: She exhibits no tenderness. No edema. Lymphadenopathy:      Cervical: No cervical adenopathy. Skin:     General: Skin is warm and dry. Coloration: Skin is not pale. Findings: No erythema or rash. Neurological:      General: No focal deficit present. Mental Status: She is alert and oriented to person, place, and time. Cranial Nerves: No cranial nerve deficit. Deep Tendon Reflexes: Reflexes are normal and symmetric. Psychiatric:         Behavior: Behavior normal.         Judgment: Judgment normal.          MDM  Number of Diagnoses or Management Options  Diagnosis management comments: Differential includes: COPD exacerbation, pneumonia, metabolic derangement.        Amount and/or Complexity of Data Reviewed  Clinical lab tests: ordered and reviewed  Tests in the radiology section of CPT®: ordered and reviewed  Tests in the medicine section of CPT®: ordered and reviewed  Discussion of test results with the performing providers: yes  Decide to obtain previous medical records or to obtain history from someone other than the patient: yes  Obtain history from someone other than the patient: yes  Review and summarize past medical records: yes  Discuss the patient with other providers: yes  Independent visualization of images, tracings, or specimens: yes    Risk of Complications, Morbidity, and/or Mortality  Presenting problems: high  Diagnostic procedures: high  Management options: high           Procedures    ED EKG interpretation:  Rhythm: normal sinus rhythm; and regular . Rate (approx.): 80 bpm; Axis: normal;  QRS interval: normal; ST/T wave: non-specific ST/T wave changes; motion artifact noted. This EKG was interpreted by Alondra Finn DO,ED Provider. Recent Results (from the past 12 hour(s))   CBC WITH AUTOMATED DIFF    Collection Time: 07/22/20 12:24 PM   Result Value Ref Range    WBC 9.7 4.6 - 13.2 K/uL    RBC 3.78 (L) 4.20 - 5.30 M/uL    HGB 11.6 (L) 12.0 - 16.0 g/dL    HCT 35.3 35.0 - 45.0 %    MCV 93.4 74.0 - 97.0 FL    MCH 30.7 24.0 - 34.0 PG    MCHC 32.9 31.0 - 37.0 g/dL    RDW 14.0 11.6 - 14.5 %    PLATELET 828 916 - 327 K/uL    MPV 9.4 9.2 - 11.8 FL    NEUTROPHILS 75 (H) 40 - 73 %    LYMPHOCYTES 13 (L) 21 - 52 %    MONOCYTES 11 (H) 3 - 10 %    EOSINOPHILS 1 0 - 5 %    BASOPHILS 0 0 - 2 %    ABS. NEUTROPHILS 7.3 1.8 - 8.0 K/UL    ABS. LYMPHOCYTES 1.3 0.9 - 3.6 K/UL    ABS. MONOCYTES 1.1 0.05 - 1.2 K/UL    ABS. EOSINOPHILS 0.1 0.0 - 0.4 K/UL    ABS. BASOPHILS 0.0 0.0 - 0.1 K/UL    DF AUTOMATED     METABOLIC PANEL, COMPREHENSIVE    Collection Time: 07/22/20 12:24 PM   Result Value Ref Range    Sodium 140 136 - 145 mmol/L    Potassium 4.2 3.5 - 5.5 mmol/L    Chloride 105 100 - 111 mmol/L    CO2 30 21 - 32 mmol/L    Anion gap 5 3.0 - 18 mmol/L    Glucose 240 (H) 74 - 99 mg/dL    BUN 16 7.0 - 18 MG/DL    Creatinine 0.83 0.6 - 1.3 MG/DL    BUN/Creatinine ratio 19 12 - 20      GFR est AA >60 >60 ml/min/1.73m2    GFR est non-AA >60 >60 ml/min/1.73m2    Calcium 9.0 8.5 - 10.1 MG/DL    Bilirubin, total 0.5 0.2 - 1.0 MG/DL    ALT (SGPT) 47 13 - 56 U/L    AST (SGOT) 35 10 - 38 U/L    Alk.  phosphatase 66 45 - 117 U/L    Protein, total 6.5 6.4 - 8.2 g/dL    Albumin 2.8 (L) 3.4 - 5.0 g/dL    Globulin 3.7 2.0 - 4.0 g/dL    A-G Ratio 0.8 0.8 - 1.7     MAGNESIUM    Collection Time: 07/22/20 12:24 PM   Result Value Ref Range    Magnesium 1.7 1.6 - 2.6 mg/dL   PHOSPHORUS    Collection Time: 07/22/20 12:24 PM   Result Value Ref Range    Phosphorus 3.2 2.5 - 4.9 MG/DL   PROTHROMBIN TIME + INR    Collection Time: 07/22/20 12:24 PM   Result Value Ref Range    Prothrombin time 12.8 11.5 - 15.2 sec    INR 1.0 0.8 - 1.2     EKG, 12 LEAD, INITIAL    Collection Time: 07/22/20 12:34 PM   Result Value Ref Range    Ventricular Rate 80 BPM    Atrial Rate 80 BPM    P-R Interval 142 ms    QRS Duration 88 ms    Q-T Interval 378 ms    QTC Calculation (Bezet) 435 ms    Calculated P Axis 22 degrees    Calculated R Axis 0 degrees    Calculated T Axis 40 degrees    Diagnosis       Normal sinus rhythm  Cannot rule out Anterior infarct , age undetermined  Abnormal ECG  When compared with ECG of 09-JUL-2020 15:17,  No significant change was found         XR CHEST PORT   Final Result   Impression:        1. Bilateral interstitial and airspace opacities, concerning for multifocal   infiltrate, including possibility of Covid 19 pneumonia. Superimposed edema not   excluded. 2:44 PM   Burns Phoenix, DO discussed care with [facility] family resident Dr. Cal Tomas. Standard discussion; including history of patients chief complaint, available diagnostic results, and treatment course. She agrees with plans for admission and will see the patient in consultation. Patient will be admitted to Dr. Priscila Marie service         Diagnosis:   1. Acute exacerbation of chronic obstructive pulmonary disease (COPD) ([facility] [address]    2. COVID-19    3. Hypoxia          Disposition: Admitted    Follow-up Information    None         Patient's Medications   Start Taking    No medications on file   Continue Taking    ALBUTEROL (PROVENTIL HFA, VENTOLIN HFA, PROAIR HFA) 90 MCG/ACTUATION INHALER    Take 2 Puffs by inhalation every four (4) hours as needed for Wheezing. ALBUTEROL-IPRATROPIUM (DUO-NEB) 2.5 MG-0.5 MG/3 ML NEBU    3 mL by Nebulization route every four (4) hours as needed for Wheezing. ASPIRIN DELAYED-RELEASE 81 MG TABLET    Take 81 mg by mouth daily. AZITHROMYCIN (ZITHROMAX) 250 MG TABLET    Take 1 Tab by mouth every Monday, Wednesday, Friday. Monday-Friday. FLUTICASONE PROPION-SALMETEROL (ADVAIR HFA) 230-21 MCG/ACTUATION INHALER    Take 2 Puffs by inhalation two (2) times a day. FLUTICASONE PROPIONATE (FLOVENT HFA) 220 MCG/ACTUATION INHALER    Take 1 Puff by inhalation two (2) times a day. FUROSEMIDE (LASIX) 20 MG TABLET    Take 20 mg by mouth daily. HYDROCHLOROTHIAZIDE (HYDRODIURIL) 12.5 MG TABLET    Take 12.5 mg by mouth daily. INSULIN GLARGINE (BASAGLAR KWIKPEN U-100 INSULIN) 100 UNIT/ML (3 ML) INPN    45 Units by SubCUTAneous route nightly. LACTOBACILLUS SP. 50 BILLION CPU (BIO-K PLUS) 50 BILLION CELL -375 MG CAP CAPSULE    Take 1 Cap by mouth daily. LISINOPRIL (PRINIVIL, ZESTRIL) 20 MG TABLET    Take 20 mg by mouth two (2) times a day. LORATADINE (CLARITIN) 10 MG TABLET    Take 10 mg by mouth daily as needed for Allergies. MONTELUKAST (SINGULAIR) 10 MG TABLET    Take 10 mg by mouth nightly. NOVOLOG FLEXPEN U-100 INSULIN 100 UNIT/ML INPN    Continue home Sliding scale insulin as prior to admission    OMEPRAZOLE (PRILOSEC) 40 MG CAPSULE    Take  by mouth. OXYGEN-AIR DELIVERY SYSTEMS    2 L by Nasal route continuous. [facility]    PREDNISONE (DELTASONE) 20 MG TABLET    Take 40 mg by mouth daily (with breakfast) for 7 days, THEN 20 mg daily (with breakfast) for 7 days. SIMETHICONE (GAS-X) 125 MG CAPSULE    Take 125 mg by mouth four (4) times daily as needed for Flatulence. TIOTROPIUM BROMIDE (SPIRIVA RESPIMAT) 2.5 MCG/ACTUATION INHALER    Take 2 Puffs by inhalation daily.    These Medications have changed    No medications on file   Stop Taking    No medications on file

## 2020-07-23 ENCOUNTER — APPOINTMENT (OUTPATIENT)
Dept: GENERAL RADIOLOGY | Age: 60
DRG: 177 | End: 2020-07-23
Attending: STUDENT IN AN ORGANIZED HEALTH CARE EDUCATION/TRAINING PROGRAM
Payer: MEDICARE

## 2020-07-23 LAB
GLUCOSE BLD STRIP.AUTO-MCNC: 217 MG/DL (ref 70–110)
GLUCOSE BLD STRIP.AUTO-MCNC: 231 MG/DL (ref 70–110)
GLUCOSE BLD STRIP.AUTO-MCNC: 302 MG/DL (ref 70–110)

## 2020-07-23 PROCEDURE — 97530 THERAPEUTIC ACTIVITIES: CPT

## 2020-07-23 PROCEDURE — 77010033678 HC OXYGEN DAILY

## 2020-07-23 PROCEDURE — 65660000000 HC RM CCU STEPDOWN

## 2020-07-23 PROCEDURE — 74011636637 HC RX REV CODE- 636/637: Performed by: STUDENT IN AN ORGANIZED HEALTH CARE EDUCATION/TRAINING PROGRAM

## 2020-07-23 PROCEDURE — 94762 N-INVAS EAR/PLS OXIMTRY CONT: CPT

## 2020-07-23 PROCEDURE — 94640 AIRWAY INHALATION TREATMENT: CPT

## 2020-07-23 PROCEDURE — 74011000258 HC RX REV CODE- 258: Performed by: STUDENT IN AN ORGANIZED HEALTH CARE EDUCATION/TRAINING PROGRAM

## 2020-07-23 PROCEDURE — 74011000250 HC RX REV CODE- 250: Performed by: INTERNAL MEDICINE

## 2020-07-23 PROCEDURE — 82962 GLUCOSE BLOOD TEST: CPT

## 2020-07-23 PROCEDURE — 74011250636 HC RX REV CODE- 250/636: Performed by: STUDENT IN AN ORGANIZED HEALTH CARE EDUCATION/TRAINING PROGRAM

## 2020-07-23 PROCEDURE — 71045 X-RAY EXAM CHEST 1 VIEW: CPT

## 2020-07-23 PROCEDURE — 97162 PT EVAL MOD COMPLEX 30 MIN: CPT

## 2020-07-23 PROCEDURE — 97535 SELF CARE MNGMENT TRAINING: CPT

## 2020-07-23 PROCEDURE — 97166 OT EVAL MOD COMPLEX 45 MIN: CPT

## 2020-07-23 PROCEDURE — 74011250637 HC RX REV CODE- 250/637: Performed by: STUDENT IN AN ORGANIZED HEALTH CARE EDUCATION/TRAINING PROGRAM

## 2020-07-23 RX ADMIN — INSULIN LISPRO 6 UNITS: 100 INJECTION, SOLUTION INTRAVENOUS; SUBCUTANEOUS at 14:00

## 2020-07-23 RX ADMIN — Medication 500 MG: at 09:00

## 2020-07-23 RX ADMIN — GUAIFENESIN 1200 MG: 600 TABLET, EXTENDED RELEASE ORAL at 21:00

## 2020-07-23 RX ADMIN — METHYLPREDNISOLONE SODIUM SUCCINATE 60 MG: 40 INJECTION, POWDER, FOR SOLUTION INTRAMUSCULAR; INTRAVENOUS at 21:00

## 2020-07-23 RX ADMIN — GUAIFENESIN 1200 MG: 600 TABLET, EXTENDED RELEASE ORAL at 15:00

## 2020-07-23 RX ADMIN — CHOLECALCIFEROL TAB 25 MCG (1000 UNIT) 2 TABLET: 25 TAB at 15:00

## 2020-07-23 RX ADMIN — IPRATROPIUM BROMIDE AND ALBUTEROL SULFATE 3 ML: .5; 3 SOLUTION RESPIRATORY (INHALATION) at 19:00

## 2020-07-23 RX ADMIN — MONTELUKAST 10 MG: 10 TABLET, FILM COATED ORAL at 21:00

## 2020-07-23 RX ADMIN — Medication 1 CAPSULE: at 15:00

## 2020-07-23 RX ADMIN — Medication 500 MG: at 20:30

## 2020-07-23 RX ADMIN — HYDROCHLOROTHIAZIDE 12.5 MG: 25 TABLET ORAL at 15:00

## 2020-07-23 RX ADMIN — Medication 10 ML: at 22:00

## 2020-07-23 RX ADMIN — PIPERACILLIN SODIUM AND TAZOBACTAM SODIUM 4.5 G: 4; .5 INJECTION, POWDER, LYOPHILIZED, FOR SOLUTION INTRAVENOUS at 15:00

## 2020-07-23 RX ADMIN — PIPERACILLIN SODIUM AND TAZOBACTAM SODIUM 4.5 G: 4; .5 INJECTION, POWDER, LYOPHILIZED, FOR SOLUTION INTRAVENOUS at 19:00

## 2020-07-23 RX ADMIN — ENOXAPARIN SODIUM 40 MG: 40 INJECTION SUBCUTANEOUS at 15:00

## 2020-07-23 RX ADMIN — INSULIN LISPRO 12 UNITS: 100 INJECTION, SOLUTION INTRAVENOUS; SUBCUTANEOUS at 18:05

## 2020-07-23 RX ADMIN — BUDESONIDE 1000 MCG: 1 SUSPENSION RESPIRATORY (INHALATION) at 20:00

## 2020-07-23 RX ADMIN — BUDESONIDE 1000 MCG: 1 SUSPENSION RESPIRATORY (INHALATION) at 02:28

## 2020-07-23 RX ADMIN — ASPIRIN 81 MG: 81 TABLET, COATED ORAL at 15:00

## 2020-07-23 RX ADMIN — ZINC SULFATE 220 MG (50 MG) CAPSULE 1 CAPSULE: CAPSULE at 15:00

## 2020-07-23 RX ADMIN — IPRATROPIUM BROMIDE AND ALBUTEROL SULFATE 3 ML: .5; 3 SOLUTION RESPIRATORY (INHALATION) at 15:00

## 2020-07-23 RX ADMIN — METHYLPREDNISOLONE SODIUM SUCCINATE 60 MG: 40 INJECTION, POWDER, FOR SOLUTION INTRAMUSCULAR; INTRAVENOUS at 15:00

## 2020-07-23 RX ADMIN — LISINOPRIL 20 MG: 20 TABLET ORAL at 21:00

## 2020-07-23 RX ADMIN — FUROSEMIDE 20 MG: 20 TABLET ORAL at 15:00

## 2020-07-23 RX ADMIN — Medication 10 ML: at 14:00

## 2020-07-23 RX ADMIN — PIPERACILLIN SODIUM AND TAZOBACTAM SODIUM 4.5 G: 4; .5 INJECTION, POWDER, LYOPHILIZED, FOR SOLUTION INTRAVENOUS at 00:02

## 2020-07-23 RX ADMIN — INSULIN LISPRO 9 UNITS: 100 INJECTION, SOLUTION INTRAVENOUS; SUBCUTANEOUS at 22:00

## 2020-07-23 RX ADMIN — IPRATROPIUM BROMIDE AND ALBUTEROL SULFATE 3 ML: .5; 3 SOLUTION RESPIRATORY (INHALATION) at 00:03

## 2020-07-23 NOTE — PROGRESS NOTES
[facility]  Progress Note    Patient: Sarwat Biggs MRN: [id number]   SSN: [id number]  YOB: 1960   Age: 60 y.o. Sex: female      Admit Date: 7/22/2020    LOS: 1 day   Chief Complaint   Patient presents with    Shortness of Breath       Subjective:     Patient is well today, was seen sleeping and on BiPap when seen this AM. Patient was a little bit dyspneic, but otherwise seemed fine overall, no major complaints past respiratory at this time. Review of Systems   Constitutional: Positive for malaise/fatigue. Negative for chills, diaphoresis, fever and weight loss. HENT: Negative for congestion. Respiratory: Positive for cough, sputum production, shortness of breath and wheezing. Negative for hemoptysis. Cardiovascular: Positive for leg swelling. Negative for chest pain and palpitations. Gastrointestinal: Positive for abdominal pain. Negative for diarrhea, nausea and vomiting. Neurological: Negative for dizziness, seizures, loss of consciousness, weakness and headaches. Objective:     Visit Vitals  /61   Pulse 75   Temp 97.7 °F (36.5 °C)   Resp 25   Wt 121.1 kg (267 lb)   SpO2 95%   BMI 47.30 kg/m²       Physical Exam:   Physical Exam  Constitutional:       General: She is not in acute distress. Appearance: She is obese. She is not ill-appearing, toxic-appearing or diaphoretic. HENT:      Head: Normocephalic and atraumatic. Nose: No congestion. Eyes:      Extraocular Movements: Extraocular movements intact. Pupils: Pupils are equal, round, and reactive to light. Cardiovascular:      Rate and Rhythm: Normal rate and regular rhythm. Pulses: Normal pulses. Heart sounds: No murmur. No friction rub. No gallop. Pulmonary:      Effort: No respiratory distress. Breath sounds: No stridor. Wheezing present. No rhonchi or rales. Chest:      Chest wall: No tenderness. Abdominal:      General: Abdomen is flat.  Bowel sounds are normal.      Palpations: Abdomen is soft. Neurological:      General: No focal deficit present. Mental Status: She is alert and oriented to person, place, and time. Intake and Output:  Current Shift: No intake/output data recorded. Last three shifts: No intake/output data recorded. Lab/Data Review:  Recent Results (from the past 12 hour(s))   D DIMER    Collection Time: 07/22/20  9:48 PM   Result Value Ref Range    D DIMER <0.27 <0.46 ug/ml(FEU)   PROTHROMBIN TIME + INR    Collection Time: 07/22/20  9:48 PM   Result Value Ref Range    Prothrombin time 13.8 11.5 - 15.2 sec    INR 1.1 0.8 - 1.2     PTT    Collection Time: 07/22/20  9:48 PM   Result Value Ref Range    aPTT 22.4 (L) 23.0 - 36.4 SEC   FIBRINOGEN    Collection Time: 07/22/20  9:48 PM   Result Value Ref Range    Fibrinogen 541 (H) 210 - 451 mg/dL   GLUCOSE, POC    Collection Time: 07/22/20 10:29 PM   Result Value Ref Range    Glucose (POC) 300 (H) 70 - 110 mg/dL     CXR: similar to one taken yesterday in ED. Knee XRay: No acute fracture or dislocations. Telemetry Yes   Oxygen 3L, bipapQHS     Assessment and Plan:   60 y.o. female with PMH significant COPD on home O2, IDDM2, HTN, HFpEF, SHERRIE on CPAP, GERD, allergic rhinitis, recent Covid infection/acute on chronic hypoxic respiratory failure, now admitted with worsening dyspnea in the setting of recent covid infection.     Acute on chronic hypoxic respiratory failure likely 2/2 Covid, in the setting of chronic COPD/asthma overlapping syndrome, HFpEF. : DDx can include COPD exacerbation, COVID pneumonia, fluid overload/CHF exacerbation. Pt w/ positive covid swab (7/7). Pt with severe underlying lung disease, Severe COPD on max therapy w/ strong asthma component. Hospitalized 5x in 2020 for COPD exacerbations. Recently DCd for COVID, from the service.  Felt better, but has had worsening respiratory for several days now. mild cough as well     - Consult Pulm  · Patient already received course of Remdesevir, no further indication for additional doses  · Repeat COVID-19 testing, sputum cx, urine legionella/strep antigens, serum procalcitonin and other inflammatory markers  · Schedule bronchodilators: duonebs q4h, pulmicort nebs 1mg BID, and albuterol PRN  · Please hold home bronchodilators. May resume on discharge  · IV steroids - 60mg BID. Wean as tolerated  · Maintain net negative fluid balance as renal function tolerates. - Abx, zosyn  - FU Daily CBC, BMP, COVID labs/coags  - FU ABG now  - Imaging CXR daily  - lovenox COVID VTE prophylaxis (BID 40mg)  - solumedrol 60mg q12h per pulm rec  - Duonebs q4h  - VitC, VitD, zinc  - pilmicort, arnuity ellipta, singulair  - Guaifenesin  - tessalon perls  - claritin if needed    B/l leg swelling, pain, in setting of COVID positive patient.  -negative physical exam for DVT  -FU b/l PVLs  -on lovenox prophylaxis dose, increase if DVT to treatment dose.      Insulin dependent Type 2 Diabete: Home lantus 45u pm and novolog SSI. -stay at Lantus at 45 U.  -SSI   -Accuchecks ACHS  -Diabetic diet  -Diabetic educator consult  -expect further labile BS due to steroid use     Hypertension, HFpEF: ECHO (5/24/20) GE 63 - 70%. No regional wall motion abnormality. Moderate (grade 2) left ventricular diastolic dysfunction. Mildly dilated left atrium. Pulmonary hypertension. Pulmonary arterial systolic pressure is 61 mmHg. Severely elevated central venous pressure (15+ mmHg); IVC diameter is larger than 21 mm and collapses less than 50% with respiration. SBP in ED elevated  to 140s-170s.  Pt on lisinopril 20mg BID and HCTZ 12.5mg daily at home. Patient endorsing increased swelling in Abdomen and legs, feels bloated.      - Continue Lasix 20mg daily  - Continue Lisinopril 20mg BID  - Continue HCTZ 12.5 mg daily     GERD: Pt takes omeprazole 40mg daily and pepcid for breakthrough symptoms at home.   -Continue protonix 40mg daily  -Simethicone PRN     Morbid obesity  -Diabetic diet       Diet DIET DIABETIC CONSISTENT CARB    DVT Prophylaxis Lovenox   GI Prophylaxis Protonix   Code status Full   Disposition >2 nights     Point of Contact Pato Engel  Relationship: Daughter  [phone]     Kayode Banda MD, PGY-1   [address] Medicine   Intern Pager: [phone]   July 23, 2020, 8:39 AM

## 2020-07-23 NOTE — PROGRESS NOTES
Reason for Admission:   COPD with acute exacerbation ([facility] [address] [J44.1]  COVID-19 virus infection [U07.1]  Acute respiratory disease due to COVID-19 virus [U07.1, J06.9]               RUR Score:     63             Resources/supports as identified by patient/family:       Top Challenges facing patient (as identified by patient/family and CM): Finances/Medication cost?     No needs identified   Transportation      Family or medicaid  Support system or lack thereof?   family  Living arrangements? Lives with daughter   Self-care/ADLs/Cognition? Alert and oriented, needs assistance with ADLs        Current Advanced Directive/Advance Care Plan:   no                          Plan for utilizing home health:    yes                      Likelihood of readmission:   HIGH    Transition of Care Plan:                    Initial assessment completed with patient, relative(s) and past medical records. Cognitive status of patient: oriented to time, place, person and situation. Face sheet information confirmed:  yes. The patient designates Marciano Argueta to participate in her discharge plan and to receive any needed information. This patient lives in a single family home with patient and daughter. Patient is not able to navigate steps as needed. Prior to hospitalization, patient was considered to be independent with ADLs/IADLS : no . If not independent,  patient needs assist with : dressing, bathing, food preparation, cooking, toileting and grooming. Patient has personal care from [facility] [facility] who also provides skilled care. Patient has a current ACP document on file: no  The patient and daughter will be available to transport patient home upon discharge. The patient already has Walker, BSC, and Home oxygen from [facility] and [employer] medical equipment available in the home. Patient is currently active with home health. If active, agency name is [employer] [facility] [employer]. Patient has not stayed in a skilled nursing facility or rehab. Was  stay within last 60 days : no. This patient is on dialysis :no    List of available Home Health agencies were provided and reviewed with the patient prior to discharge. Freedom of choice signed: yes, for [facility]. Currently, the discharge plan is Home with [address]. The patient states that she can obtain her medications from the pharmacy, and take her medications as directed. Patient's current insurance is Medicare and Medicaid. Care Management Interventions  PCP Verified by CM:  Yes  Mode of Transport at Discharge: Self  Physical Therapy Consult: Yes  Occupational Therapy Consult: Yes  Current Support Network: Lives with Caregiver(DAUGHTER)  Confirm Follow Up Transport: Family  The Plan for Transition of Care is Related to the Following Treatment Goals : [address]  The Patient and/or Patient Representative was Provided with a Choice of Provider and Agrees with the Discharge Plan?: Yes  Name of the Patient Representative Who was Provided with a Choice of Provider and Agrees with the Discharge Plan: Katelynn Maddox  Freedom of Choice List was Provided with Basic Dialogue that Supports the Patient's Individualized Plan of Care/Goals, Treatment Preferences and Shares the Quality Data Associated with the Providers?: Yes  Discharge Location  Discharge Placement: Home with home health        Georgiana Griffin, [address] [facility] BSN  Care Manager  [phone]

## 2020-07-23 NOTE — PROGRESS NOTES
Problem: Self Care Deficits Care Plan (Adult)  Goal: *Acute Goals and Plan of Care (Insert Text)  Description: Occupational Therapy Goals  Initiated 7/23/2020 within 7 day(s). 1.  Patient will perform lower body dressing with modified independence. 2.  Patient will perform toileting with modified independence. 3.  Patient will perform toilet transfers with modified independence. 4.  Patient will perform a functional activity/ADL in standing with modified independence for 3-5 minutes. 5.  Patient will participate in upper extremity therapeutic exercise/activities with modified independence for 8 minutes. 6.  Patient will utilize energy conservation techniques during functional activities with verbal cues. Prior Level of Function: Pt was (I) with basic self-care/ADLs and functional mobility without AD PTA. Pt recently d/c'd from the hospital last Satuday, 7/18/2020. Pt only able to ambulate minimally, and has been using her BSC more frequently than her toilet at home. Pt's shower on the second floor; pt has a shower chair. Pt has a shower chair. Outcome: Progressing Towards Goal   OCCUPATIONAL THERAPY EVALUATION    Patient: Dawood Garduno (73 y.o. female)  Date: 7/23/2020  Primary Diagnosis: COPD with acute exacerbation ([facility] [address] [J44.1]  COVID-19 virus infection [U07.1]  Acute respiratory disease due to COVID-19 virus [U07.1, J06.9]        Precautions:   Contact, Other (comment)(droplet plus)    ASSESSMENT :  Upon entering room, pt received semi-reclined in stretcher, alert, and agreeable to OT eval.  Based on the objective data described below, the patient presents with decreased strength and decreased activity tolerance affecting the patients safety and ability to perform basic ADLs. Pt on  O2 via NC. Pt completed bed mobility and donned B shoes with supervision/setup.  Pt presenting with increased SOB during functional mobility; pt denied to use the [facility] at this time but was able to take a step forward/backward in prep for transfers to toilet. Initiated cues for pt to perform pursed lip breathing exercises. Pt's SpO2 seen >90% during session. Pt did not state pain, but reported \"heaviness\" pointing to chest. Educated pt on the role of Ot, evaluation process, and goals for therapy with pt demo good understanding. The patient requires skilled OT services to assess safety and increase independence with basic self-care/ADLs, enhancing the patients quality of life by improving their ability to return to OF. At the end of the session, pt left resting edge of stretcher call bell in reach, with all needs met. Patient will benefit from skilled intervention to address the above impairments. Patient's rehabilitation potential is considered to be Good  Factors which may influence rehabilitation potential include:   []             None noted  []             Mental ability/status  [x]             Medical condition  []             Home/family situation and support systems  []             Safety awareness  []             Pain tolerance/management  []             Other:      PLAN :  Recommendations and Planned Interventions:   [x]               Self Care Training                  [x]      Therapeutic Activities  [x]               Functional Mobility Training   []      Cognitive Retraining  [x]               Therapeutic Exercises           [x]      Endurance Activities  [x]               Balance Training                    []      Neuromuscular Re-Education  []               Visual/Perceptual Training     [x]      Home Safety Training  [x]               Patient Education                   [x]      Family Training/Education  []               Other (comment):    Frequency/Duration: Patient will be followed by occupational therapy 1-2 times per day/4-7 days per week to address goals.   Discharge Recommendations: Home Health  Further Equipment Recommendations for Discharge: N/A; Pt has all recommnded equipment to safely return home     SUBJECTIVE:   When asked if she was in pain, pt stated just heaviness\" seen pointing to her chest.    OBJECTIVE DATA SUMMARY:     Past Medical History:   Diagnosis Date    Asthma     Chronic lung disease     COPD     Cystocele, midline     Diabetes mellitus ([facility] [address]     GERD (gastroesophageal reflux disease)     Hidradenitis suppurativa     Hyperlipidemia     Hypertension     SHERRIE on CPAP     CPAP    Stress incontinence      Past Surgical History:   Procedure Laterality Date    BREAST SURGERY PROCEDURE UNLISTED      Right breast benign tumor removal    HX APPENDECTOMY      HX CHOLECYSTECTOMY      HX DILATION AND CURETTAGE      Dysfunctional uterine bleeding, thought 2/2 fibroids    HX TUBAL LIGATION       Barriers to Learning/Limitations: None  Compensate with: visual, verbal, tactile, kinesthetic cues/model    Home Situation:      []  Right hand dominant   []  Left hand dominant    Cognitive/Behavioral Status:  Neurologic State: Alert  Orientation Level: Oriented X4  Cognition: Follows commands  Safety/Judgement: Fall prevention    Skin: Visible skin appeared intact. Edema: None noted      Coordination: BUE  Coordination: Within functional limits  Fine Motor Skills-Upper: Left Intact; Right Intact    Gross Motor Skills-Upper: Left Intact; Right Intact    Balance:  Sitting: Intact    Strength: BUE  Strength: Generally decreased, functional    Tone & Sensation: BUE  Tone: Normal  Sensation: Intact      Range of Motion: BUE  AROM: Within functional limits      Functional Mobility and Transfers for ADLs:  Bed Mobility:  Supine to Sit: Modified independent  Scooting: Modified independent  Transfers:  Sit to Stand: Supervision  Stand to Sit: Supervision      ADL Assessment:   Feeding: Modified independent    Oral Facial Hygiene/Grooming: Supervision    Bathing: Supervision    Upper Body Dressing: Modified independent    Lower Body Dressing: Supervision    Toileting: Supervision                ADL Intervention:  Lower Body Dressing Assistance  Dressing Assistance: Supervision  Socks: Supervision  Position Performed: Seated edge of bed    Cognitive Retraining  Safety/Judgement: Fall prevention        Pain:  Pain level pre-treatment: 0/10   Pain level post-treatment: 0/10   Pain Intervention(s): Medication (see MAR); Rest, Ice, Repositioning   Response to intervention: Nurse notified, See doc flow    Activity Tolerance:   Fair    Please refer to the flowsheet for vital signs taken during this treatment. After treatment:   [] Patient left in no apparent distress sitting up in chair  [x] Patient left in no apparent distress sitting edge of  [x] Call bell left within reach  [] Nursing notified  [] Caregiver present  [] Bed alarm activated    COMMUNICATION/EDUCATION:   [x] Role of Occupational Therapy in the acute care setting  [] Home safety education was provided and the patient/caregiver indicated understanding. [x] Patient/family have participated as able in goal setting and plan of care. [x] Patient/family agree to work toward stated goals and plan of care. [] Patient understands intent and goals of therapy, but is neutral about his/her participation. [] Patient is unable to participate in goal setting and plan of care. Thank you for this referral.  Lien Lawler MS, OTR/L  Time Calculation: 23 mins    Eval Complexity: History: MEDIUM Complexity : Expanded review of history including physical, cognitive and psychosocial  history ; Examination: LOW Complexity : 1-3 performance deficits relating to physical, cognitive , or psychosocial skils that result in activity limitations and / or participation restrictions ; Decision Making:MEDIUM Complexity : Patient may present with comorbidities that affect occupational performnce.  Miniml to moderate modification of tasks or assistance (eg, physical or verbal ) with assesment(s) is necessary to enable patient to complete evaluation

## 2020-07-23 NOTE — ACP (ADVANCE CARE PLANNING)
Advance Care Planning     Advance Care Planning Clinical Specialist  Conversation Note      Date of ACP Conversation: 7/22/2020    Iliana Parkinson Conducted with:   Patient with Decision Making Capacity    ACP Clinical Specialist: Marsha Power    *When Decision Maker makes decisions on behalf of the incapacitated patient: Decision Maker is asked to consider and make decisions based on patient values, known preferences, or best interests. Health Care Decision Maker:    Current Designated Health Care Decision Maker:   Primary Decision Maker: Caryn Curran - [place] - [phone]    Secondary Decision Maker: Isaias Rowell - Sister - [phone]  (If there is a [address] named in the \"Healthcare Decision Makers\" box in the ACP activity, but it is not visible above, be sure to open that field and then select the health care decision maker relationship (ie \"primary\") in the blank space to the right of the name.) Validate  this information as still accurate & up-to-date; edit [place] field as needed.)    Note: Assess and validate information in current ACP documents, as indicated. Note: If the relationship of these Decision-Makers to the patient does NOT follow your state's Next of Kin hierarchy, recommend that patient complete ACP document that meets state-specific requirements to allow them to act on the patient's behalf when appropriate. Care Preferences    Hospitalization: \"If your health worsens and it becomes clear that your chance of recovery is unlikely, what would your preference be regarding hospitalization? \"    Choice:  [x]  The patient wants hospitalization  []  The patient prefers comfort-focused treatment without hospitalization.       NOTE: If the patient has a valid advance directive AND now provides care preference(s) that are inconsistent with that prior directive, advise the patient to consider either: creating a new advance directive that complies with state-specific requirements; or, if that is not possible, orally revoking that prior directive in accordance with state-specific requirements, which must be documented in the EHR. [] Yes  [x] No   Educated Patient / Piter Mccauley regarding differences between Advance Directives and portable DNR orders.     Length of ACP Conversation in minutes:      Conversation Outcomes:  [] ACP discussion completed  [] Existing advance directive reviewed with patient; no changes to patient's previously recorded wishes   [] New Advance Directive completed   [] Portable Do Not Rescitate prepared for Provider review and signature  [] POLST/POST/MOLST/MOST prepared for Provider review and signature      Follow-up plan:    [x] Schedule follow-up conversation to continue planning  [x] Referred individual to Provider for additional questions/concerns   [] Advised patient/agent/surrogate to review completed ACP document and update if needed with changes in condition, patient preferences or care setting     [] This note routed to one or more involved healthcare providers    Paris Aguirre RN BSN  Care Manager  [phone]

## 2020-07-23 NOTE — DISCHARGE INSTRUCTIONS
Nutrition Educational Handouts    Dietitian Contact Number:   [phone]      Handout #1                Handout #2              Handout #3                          Handout #4          Handout #5              ~~~~~~~~~~~~~~~~~~~~~~~~~~~~~~~~~~~~~~~~~~~~~~~~~~~~~~~~

## 2020-07-23 NOTE — PROGRESS NOTES
[id number] [employer] to inform of patients admission. Hamilton Valdivia stated that [facility] was providing personal care, but is not able to accomodate for skilled care at this time. Freedom of choice obtained for [facility] care.       Caridad Martínez, RN BSN  Care Manager  [phone]

## 2020-07-23 NOTE — CONSULTS
Comprehensive Nutrition Assessment    Type and Reason for Visit: Initial, Consult, Patient education(general nutrition management and supplements; diet education consults)    Nutrition Recommendations/Plan:   - continue po diet; monitor meal intake and diet tolerance  - Add educational handouts in discharge instructions. Plan to provide diet education prior to pt discharge. Nutrition Assessment:  Unable to reach pt via cell phone fo rremote assessment; attempted to speak with her daughter, but she was not able to participate in conversation. pt is under COVID rule out. Pt discussed with RN; unable to assess po intake at the time of discussion. Has hx of DM; will leave educational handouts for diabetic diet and CHO in discharge instructions section of chart. Was needing BiPAP, but able to transitioned to nasal cannula      Malnutrition Assessment:  Malnutrition Status:  No malnutrition        Nutrition History and Allergies:   noted weight gain PTA per chart hx.   has fish allergy       Estimated Daily Nutrient Needs:  Energy (kcal):  8280-5052  Protein (g):         Fluid (ml/day):  0228-0947    Nutrition Related Findings:  Last BM is unknown. Pt taking vitamin C, vitamin D3, zinc sulfate supplements. On lactobacillus and steroid.       Wounds:    None       Current Nutrition Therapies:   DIET DIABETIC CONSISTENT CARB Regular    Anthropometric Measures:  · Height:  5' 3\" (160 cm)  · Current Body Wt:  121.1 kg (266 lb 15.6 oz)   · Ideal Body Wt:  115 lb:  232.2 %   · BMI Categories:  Obese class 3 (BMI 40.0 or greater)       Nutrition Diagnosis:   · Food & nutrition-related knowledge deficit related to (hx of diabetes) as evidenced by (pt with A1c of 8.4%)      Nutrition Interventions:   Food and/or Nutrient Delivery: Continue current diet, Mineral supplement, Vitamin supplement  Nutrition Education and Counseling: Education initiated(educational handouts provided in discharge instructions)  Coordination of Nutrition Care: Continued inpatient monitoring    Goals:  PO nutrition intake will meet >75% of patient's estimated nutrition needs over the next 7 days. Patient will increase knowledge and understanding of the diabetic diet prior to discharge.        Nutrition Monitoring and Evaluation:  Food/Nutrient Intake Outcomes: Diet advancement/tolerance, Vitamin/mineral intake  Physical Signs/Symptoms Outcomes: Biochemical data    Discharge Planning:    No discharge needs at this time       Electronically signed by Rony Rosales RD on 7/23/2020 at 12:59 PM    Contact:  Pager [phone]

## 2020-07-23 NOTE — DIABETES MGMT
Diabetes Patient/Family Education Record  Factors That  May Influence Patients Ability  to Learn or  Comply with Recommendations   []   Language barrier    []   Cultural needs   []   Motivation    []   Cognitive limitation    []   Physical   [x]   Education    []   Physiological factors   []   Hearing/vision/speaking impairment   []   Congregational beliefs    []   Financial factors   []  Other:   []  No factors identified at this time. Person Instructed:   [x]   Patient   []   Family   []  Other     Preference for Learning:   [x]   Verbal   []   Written   []  Demonstration     Level of Comprehension & Competence:    [x]  Good                                      [] Fair                                     []  Poor                             []  Needs Reinforcement   [x]  Teachback completed    Education Component:   [x]  Medication management, including how to administer insulin (if appropriate) and potential medication interactions: Yes. Patient reported list of home diabetes medications:  Basaglar (lantus) pen insulin 45 units daily at night  Novolog meal time sliding scale insulin (pen)    Patient reported compliant in taking her insulin but her blood sugar remain elevated due to steroids. [x]  Nutritional management -obtain usual meal pattern: Yes. Patient shared struggles with carbs but stated that she finally managed to successfully limit intake of starches and fruit. She has been following the recommended serving size/portion control of carbs because consuming a lot will show the effect on her BG meter. [x]  Exercise: Yes. Patient is not able to tolerate regular walking exercise even at home due to chronic breathing problem. [x]  Signs, symptoms, and treatment of hyperglycemia and hypoglycemia: Yes. Patient verbalized understanding that taking her diabetes medications, consistency in carb intake can help prevent high blood sugar.  At this time, patient stated that preventing high blood sugar is challenge due to chronic use of steroids for her breathing problem and treatment for COVID-19. [x] Prevention, recognition and treatment of hyperglycemia and hypoglycemia: Yes. Patient reported that it has been a very long time since she experienced low blood sugar but she knows the symptoms, treatment and how to prevent it. [x]  Importance of blood glucose monitoring and how to obtain a blood glucose meter: Yes. Patient reported that she's compliant in home blood sugar monitoring because of novolog sliding scale insulin before meals. []  Instruction on use of the blood glucose meter   [x]  Discuss the importance of HbA1C monitoring: Yes. Your A1C  was   Lab Results   Component Value Date/Time    Hemoglobin A1c 8.4 (H) 05/22/2020 05:56 AM     This lab test reflects that her blood sugar was elevated  over the past 3 months and should be evaluated by her primary care provider. It is important to follow up with her provider on a routine basis to continue to evaluate her blood sugar discuss any necessary changes in treatment. []  Sick day guidelines   [x]  Proper use and disposal of lancets, needles, syringes or insulin pens (if appropriate): Yes. [x]  Potential long-term complications (retinopathy, kidney disease, neuropathy, foot care): Yes. [x] Information about whom to contact in case of emergency or for more information: Yes. [x]  Goal:  Patient/family will demonstrate understanding of Diabetes Self Management Skills by: 7/30/2020  Plan for post-discharge education or self-management support:    [x] Outpatient class schedule provided: but facility is closed at this time to visitors due to [place] pandemic            [] Patient Declined    [] Scheduled for outpatient classes (date) _______  Verify:  Does patient understand how diabetes medications work? Yes. Does patient know what their most recent A1c is? Yes. Does patient monitor glucose at home? Yes.  Does patient have difficulty obtaining diabetes medications and testing supplies?  No.       Nydia Keenan RN [facility]  Pager: [phone]

## 2020-07-23 NOTE — CONSULTS
[facility]. Pulmonary, Critical Care, and Sleep Medicine    Initial Patient Consult    Name: Rosa Sandoval MRN: [id number]   : 1960 Hospital: [facility]   Date: 2020        This patient has been seen and evaluated at the request of Dr. Eloisa Morejon for severe dyspnea. IMPRESSION:   · Acute exacerbation of COPD/asthma overlap syndrome -- Secondary to COVID-19 pneumonia/sepsis  · Acute on chronic hypoxic and hypercapnic respiratory failure  · Underlying poorly controlled severe persistent asthma with steroid dependence  · COPD, gold risk category D  · COVID-19 pneumonia/severe sepsis:  Pt has severe disease given hospitalization, discharge and worsening of sx  · Worsening dyspnea/SOB:  Etiology multifactorial, due to above  · Morbid obesity:Body mass index is 47.3 kg/m².   · Obesity hypoventilation syndrome with SHERRIE: Patient on trilogy in the [address] [place] [address] setting at home  · Chronic steroid dependence  · Previous smoking hx:  Quit 14 years ago  · CHFpEF  · Pulm HTN:  WHO groups 2 and 3 disease     Patient Active Problem List   Diagnosis Code    Essential hypertension, benign I10    Diabetes mellitus, type 2 ([facility] [address] E11.9    Esophageal reflux K21.9    SHERRIE on CPAP G47.33, Z99.89    COPD (chronic obstructive pulmonary disease) ([facility] [address] J44.9    Allergic rhinitis J30.9    COPD with acute exacerbation ([facility] J44.1    Obesity, Class III, BMI 40-49.9 (morbid obesity) ([facility]) E66.01    Chest pain R07.9    Dyspnea R06.00    COPD exacerbation ([facility]) J44.1    Acute exacerbation of COPD with asthma ([facility]) J44.1, O81.959    Diastolic CHF, chronic ([facility]) I50.32    Diverticulosis K57.90    COPD with acute bronchitis ([facility]) J44.0, J20.9    Hyperlipidemia E78.5    Type 2 diabetes with nephropathy ([facility]) E11.21    Bilateral carotid bruits R09.89    Palpitations R00.2    Acute exacerbation of chronic obstructive pulmonary disease (COPD) ([facility]) J44.1    Atelectasis of right lung J98.11    Hypoxia R09.02    COVID-19 U07.1    Hypokalemia E87.6    COVID-19 virus infection U07.1    Acute respiratory disease due to COVID-19 virus U07.1, J06.9      RECOMMENDATIONS:   Patient already received course of Remdesevir, no further indication for additional doses  Repeat COVID-19 testing, sputum cx, urine legionella/strep antigens, serum procalcitonin and other inflammatory markers  Schedule bronchodilators: duonebs q4h, pulmicort nebs 1mg BID, and albuterol PRN  Please hold home bronchodilators. May resume on discharge  IV steroids - 60mg BID. Wean as tolerated  Maintain net negative fluid balance as renal function tolerates. Diuresis per primary service  Supplemental oxygen to maintain SpO2 >88%  Bipap-ST QHS and PRN -- can do AVAPS-ST mode if possible  Please assess for home oxygen need prior to discharge  Aggressive pulmonary toileting/bronchial hygiene  Frequent incentive spirometry  Aspiration precautions including elevating HOB >30deg  PT/OT, OOB, ambulate with assistance as tolerated  DVT ppx per primary service -- advise high intensity ppx with lovenox SQ 40mg BID  Will follow    Prognosis guarded     Subjective:      Patient is a 60 y.o. female with a past medical history of/overlap syndrome, chronic steroid dependence, obesity hypoventilation syndrome on home trilogy machine, morbid obesity, CHFpEF, chronic hypoxic respiratory failure [facility] of worsening shortness of breath and dyspnea. Patient was recently discharged [facility] last week for hospitalization for COVID-19 pneumonia/sepsis. During that hospitalization, patient received Remdesevir, IV steroids, convalescent plasma. Patient was discharged on a prednisone taper, notes that she was using nebulizer twice on the weekend, but then did not use it for the next few days, and noted worsening shortness of breath and dyspnea.   Patient reports that although she was using her home inhalers and was taking prednisone, she reports that she continued to worsen. Dyspnea was present at rest, patient found it very difficult to breathe, so she presented back to the ER. Patient was admitted by the family medicine service who consulted me. Of note, patient did a telehealth visit with her PCP earlier today, and was advised to go to the ER. Patient was reporting shortness of breath with intermittent cough, fatigue, lower extremity pain, with no alleviating factors. Patient reports feeling very poorly, reports that very difficult for her to walk and breathe. Bilateral lower extremity duplex was done in the evening, prelim report was negative. Patient denies any hemoptysis, chest pain, vomiting, diarrhea, worsening LE swelling    Past Medical History:   Diagnosis Date    Asthma     Chronic lung disease     COPD     Cystocele, midline     Diabetes mellitus ([facility] [address]     GERD (gastroesophageal reflux disease)     Hidradenitis suppurativa     Hyperlipidemia     Hypertension     SHERRIE on CPAP     CPAP    Stress incontinence       Past Surgical History:   Procedure Laterality Date    BREAST SURGERY PROCEDURE UNLISTED      Right breast benign tumor removal    HX APPENDECTOMY      HX CHOLECYSTECTOMY      HX DILATION AND CURETTAGE      Dysfunctional uterine bleeding, thought 2/2 fibroids    HX TUBAL LIGATION        Prior to Admission medications    Medication Sig Start Date End Date Taking? Authorizing Provider   predniSONE (DELTASONE) 20 mg tablet Take 40 mg by mouth daily (with breakfast) for 7 days, THEN 20 mg daily (with breakfast) for 7 days. 7/19/20 8/2/20 Yes Jamir Torres MD   fluticasone propionate (Flovent HFA) 220 mcg/actuation inhaler Take 1 Puff by inhalation two (2) times a day. 7/7/20  Yes Evonne Kate PA-C   albuterol-ipratropium (DUO-NEB) 2.5 mg-0.5 mg/3 ml nebu 3 mL by Nebulization route every four (4) hours as needed for Wheezing.  7/7/20  Yes [place] Evonne KELLY PA-C   albuterol (PROVENTIL HFA, VENTOLIN HFA, PROAIR HFA) 90 mcg/actuation inhaler Take 2 Puffs by inhalation every four (4) hours as needed for Wheezing. 7/7/20  Yes Evonne Kate PA-C   azithromycin [facility]) 250 mg tablet Take 1 Tab by mouth every Monday, Wednesday, Friday. Monday-Friday. 7/3/20  Yes David Ledbetter MD   omeprazole (PRILOSEC) 40 mg capsule Take  by mouth. 3/9/18  Yes Provider, Historical   insulin glargine (Basaglar KwikPen U-100 Insulin) 100 unit/mL (3 mL) inpn 45 Units by SubCUTAneous route nightly. Yes Provider, Historical   furosemide (Lasix) 20 mg tablet Take 20 mg by mouth daily. Yes Provider, Historical   lactobacillus sp. 50 billion cpu (BIO-K PLUS) 50 billion cell -375 mg cap capsule Take 1 Cap by mouth daily. 3/10/20  Yes Charley Castellanos MD   simethicone (GAS-X) 125 mg capsule Take 125 mg by mouth four (4) times daily as needed for Flatulence. Yes Provider, Historical   loratadine (CLARITIN) 10 mg tablet Take 10 mg by mouth daily as needed for Allergies. Yes Provider, Historical   lisinopril (PRINIVIL, ZESTRIL) 20 mg tablet Take 20 mg by mouth two (2) times a day. Yes Provider, Historical   fluticasone propion-salmeterol (ADVAIR HFA) 230-21 mcg/actuation inhaler Take 2 Puffs by inhalation two (2) times a day. Yes Provider, Historical   hydroCHLOROthiazide (HYDRODIURIL) 12.5 mg tablet Take 12.5 mg by mouth daily. Yes Provider, Historical   tiotropium bromide (SPIRIVA RESPIMAT) 2.5 mcg/actuation inhaler Take 2 Puffs by inhalation daily. Yes Provider, Historical   NOVOLOG FLEXPEN U-100 INSULIN 100 unit/mL inpn Continue home Sliding scale insulin as prior to admission  Patient taking differently: 1 Units by SubCUTAneous route three (3) times daily. If BG <100=0u  101-150=5u  151-250=8u  251-300=12u  >300 call MD   7/10/18  Yes Kieran Wu MD   OXYGEN-AIR DELIVERY SYSTEMS 2 L by Nasal route continuous.  First Choice   Yes Provider, Historical   aspirin delayed-release 81 mg tablet Take 81 mg by mouth daily. Yes Provider, Historical   montelukast (SINGULAIR) 10 mg tablet Take 10 mg by mouth nightly. Yes Other, MD Lauren     Allergies   Allergen Reactions    Ancef [Cefazolin] Hives    Contrast Agent [Iodine] Anaphylaxis, Shortness of Breath and Swelling     Needs pre-medication for IV contrast with Benadryl, Solu-Medrol    Fish Containing Products Anaphylaxis     Pt states she had a severe allergic reaction at 7 y/o.  Statins-Hmg-Coa Reductase Inhibitors Myalgia    Metformin Other (comments)     Abdominal pain, diarrhea.     Codeine Other (comments)     Altered mental status      Social History     Tobacco Use    Smoking status: Former Smoker     Packs/day: 1.00     Years: 30.00     Pack years: 30.00     Types: Cigarettes     Start date: 1966     Last attempt to quit: 2006     Years since quittin.8    Smokeless tobacco: Former User    Tobacco comment: 1-1.5 packs per day   Substance Use Topics    Alcohol use: No      Family History   Problem Relation Age of Onset    Hypertension Mother     Stroke Mother     Breast Cancer Mother         Bilateral mastectomies    Cancer Mother         ovarian and breast    Heart Failure Mother     Heart Attack Father         2011    Heart Surgery Father         CABG    Heart Failure Father     COPD Sister         Heavy smoker    Cancer Sister         ovarian    Heart Failure Sister     Lung Disease Sister     Asthma Child     Cancer Maternal Aunt         pancreatic    Cancer Maternal Grandfather         stomach        Current Facility-Administered Medications   Medication Dose Route Frequency    methylPREDNISolone (PF) (SOLU-MEDROL) injection 60 mg  60 mg IntraVENous Q12H    piperacillin-tazobactam (ZOSYN) 4.5 g in 0.9% sodium chloride (MBP/ADV) 100 mL MBP  4.5 g IntraVENous Q6H    [START ON 2020] aspirin delayed-release tablet 81 mg  81 mg Oral DAILY    [START ON 2020] fluticasone furoate (ARNUITY ELLIPTA) 100 mcg/puff  2 Puff Inhalation DAILY    [START ON 2020] furosemide (LASIX) tablet 20 mg  20 mg Oral DAILY    [START ON 2020] hydroCHLOROthiazide (HYDRODIURIL) tablet 12.5 mg  12.5 mg Oral DAILY    [START ON 2020] lactobacillus sp. 50 billion cpu (BIO-K PLUS) capsule 1 Cap  1 Cap Oral DAILY    lisinopriL (PRINIVIL, ZESTRIL) tablet 20 mg  20 mg Oral BID    montelukast (SINGULAIR) tablet 10 mg  10 mg Oral QHS    sodium chloride (NS) flush 5-40 mL  5-40 mL IntraVENous Q8H    [START ON 2020] insulin glargine (LANTUS) injection 45 Units  45 Units SubCUTAneous DAILY    insulin lispro (HUMALOG) injection   SubCUTAneous AC&HS    ascorbic acid (vitamin C) (VITAMIN C) tablet 500 mg  500 mg Oral BID    [START ON 2020] zinc sulfate (ZINCATE) 220 (50) mg capsule 1 Cap  1 Cap Oral DAILY    [START ON 2020] cholecalciferol (VITAMIN D3) (1000 Units /25 mcg) tablet 2 Tab  2,000 Units Oral DAILY    enoxaparin (LOVENOX) injection 40 mg  40 mg SubCUTAneous Q12H    guaiFENesin ER (MUCINEX) tablet 1,200 mg  1,200 mg Oral Q12H    [START ON 2020] albuterol-ipratropium (DUO-NEB) 2.5 MG-0.5 MG/3 ML  3 mL Nebulization Q4H RT    budesonide (PULMICORT) 1,000 mcg/2 mL nebulizer susp  1,000 mcg Nebulization BID RT       Review of Systems:  A comprehensive ROS was obtained as stated in HPI, all others are negative      Objective:   Vital Signs:    Visit Vitals  /64   Pulse 65   Temp 97.7 °F (36.5 °C)   Resp 25   Wt 121.1 kg (267 lb)   SpO2 99%   BMI 47.30 kg/m²       O2 Device: Nasal cannula   O2 Flow Rate (L/min): 3 l/min   Temp (24hrs), Av.7 °F (36.5 °C), Min:97.7 °F (36.5 °C), Max:97.7 °F (36.5 °C)       Intake/Output:   Last shift:      No intake/output data recorded. Last 3 shifts: No intake/output data recorded.   No intake or output data in the 24 hours ending 20 9893   Physical Exam:   General:  Alert, cooperative, mild respiratory distress with labored breathing, laying in stretcher semi-upright, wearing nasal cannula   Head:  Normocephalic, without obvious abnormality, atraumatic. Eyes:  Conjunctivae/corneas clear. ANicteric, PERRLA, EOMI   Nose: Nares normal. Mucosa normal. No drainage or sinus tenderness. Throat: Lips, mucosa dry. NO thrush; poor dentition, no oral lesions, Mallamapti IV   Neck: Supple, symmetrical, trachea midline, no adenopathy, thyroid: no enlargment/tenderness/nodules, no carotid bruit and no JVD. No crepitus   Back:   Symmetric, no curvature, no spine tenderness or flank pain   Lungs:    Very poor air entry bilaterally, scattered wheezes and rhonchi throughout all lung fields   Chest wall:  No tenderness or deformity. NO CREPITUS   Heart:  Regular rate and rhythm, S1, S2 normal, no murmur, click, rub or gallop. Abdomen:   Soft, non-tender. Bowel sounds normal. No masses,  No organomegaly. No paradoxical motion   Extremities: normal, atraumatic, no cyanosis or clubbing. Patient has trace edema bilaterally in lower extremities   Pulses: 1-2+ and symmetric all extremities.    Skin: Skin color, texture, turgor normal. No rashes or lesions   Lymph nodes: Cervical, supraclavicular, and axillary nodes normal.   Neurologic: Grossly nonfocal, strength and coordination grossly intact throughout all extremities, sensation also grossly intact          Data review:   Labs:  Recent Results (from the past 24 hour(s))   CBC WITH AUTOMATED DIFF    Collection Time: 07/22/20 12:24 PM   Result Value Ref Range    WBC 9.7 4.6 - 13.2 K/uL    RBC 3.78 (L) 4.20 - 5.30 M/uL    HGB 11.6 (L) 12.0 - 16.0 g/dL    HCT 35.3 35.0 - 45.0 %    MCV 93.4 74.0 - 97.0 FL    MCH 30.7 24.0 - 34.0 PG    MCHC 32.9 31.0 - 37.0 g/dL    RDW 14.0 11.6 - 14.5 %    PLATELET 512 706 - 188 K/uL    MPV 9.4 9.2 - 11.8 FL    NEUTROPHILS 75 (H) 40 - 73 %    LYMPHOCYTES 13 (L) 21 - 52 %    MONOCYTES 11 (H) 3 - 10 %    EOSINOPHILS 1 0 - 5 %    BASOPHILS 0 0 - 2 % ABS. NEUTROPHILS 7.3 1.8 - 8.0 K/UL    ABS. LYMPHOCYTES 1.3 0.9 - 3.6 K/UL    ABS. MONOCYTES 1.1 0.05 - 1.2 K/UL    ABS. EOSINOPHILS 0.1 0.0 - 0.4 K/UL    ABS. BASOPHILS 0.0 0.0 - 0.1 K/UL    DF AUTOMATED     METABOLIC PANEL, COMPREHENSIVE    Collection Time: 07/22/20 12:24 PM   Result Value Ref Range    Sodium 140 136 - 145 mmol/L    Potassium 4.2 3.5 - 5.5 mmol/L    Chloride 105 100 - 111 mmol/L    CO2 30 21 - 32 mmol/L    Anion gap 5 3.0 - 18 mmol/L    Glucose 240 (H) 74 - 99 mg/dL    BUN 16 7.0 - 18 MG/DL    Creatinine 0.83 0.6 - 1.3 MG/DL    BUN/Creatinine ratio 19 12 - 20      GFR est AA >60 >60 ml/min/1.73m2    GFR est non-AA >60 >60 ml/min/1.73m2    Calcium 9.0 8.5 - 10.1 MG/DL    Bilirubin, total 0.5 0.2 - 1.0 MG/DL    ALT (SGPT) 47 13 - 56 U/L    AST (SGOT) 35 10 - 38 U/L    Alk.  phosphatase 66 45 - 117 U/L    Protein, total 6.5 6.4 - 8.2 g/dL    Albumin 2.8 (L) 3.4 - 5.0 g/dL    Globulin 3.7 2.0 - 4.0 g/dL    A-G Ratio 0.8 0.8 - 1.7     MAGNESIUM    Collection Time: 07/22/20 12:24 PM   Result Value Ref Range    Magnesium 1.7 1.6 - 2.6 mg/dL   PHOSPHORUS    Collection Time: 07/22/20 12:24 PM   Result Value Ref Range    Phosphorus 3.2 2.5 - 4.9 MG/DL   LD    Collection Time: 07/22/20 12:24 PM   Result Value Ref Range     (H) 81 - 234 U/L   PROTHROMBIN TIME + INR    Collection Time: 07/22/20 12:24 PM   Result Value Ref Range    Prothrombin time 12.8 11.5 - 15.2 sec    INR 1.0 0.8 - 1.2     PROCALCITONIN    Collection Time: 07/22/20 12:24 PM   Result Value Ref Range    Procalcitonin <0.05 ng/mL   NT-PRO BNP    Collection Time: 07/22/20 12:24 PM   Result Value Ref Range    NT pro- 0 - 900 PG/ML   LD    Collection Time: 07/22/20 12:24 PM   Result Value Ref Range     (H) 81 - 234 U/L   FERRITIN    Collection Time: 07/22/20 12:24 PM   Result Value Ref Range    Ferritin 51 8 - 388 NG/ML   C REACTIVE PROTEIN, QT    Collection Time: 07/22/20 12:24 PM   Result Value Ref Range    C-Reactive protein 4.1 (H) 0 - 0.3 mg/dL   EKG, 12 LEAD, INITIAL    Collection Time: 07/22/20 12:34 PM   Result Value Ref Range    Ventricular Rate 80 BPM    Atrial Rate 80 BPM    P-R Interval 142 ms    QRS Duration 88 ms    Q-T Interval 378 ms    QTC Calculation (Bezet) 435 ms    Calculated P Axis 22 degrees    Calculated R Axis 0 degrees    Calculated T Axis 40 degrees    Diagnosis       Normal sinus rhythm  Cannot rule out Anterior infarct , age undetermined  Abnormal ECG  When compared with ECG of 09-JUL-2020 15:17,  No significant change was found  Confirmed by Shelia Haji MD, Meron Duarte ([id number]) on 7/22/2020 5:14:56 PM     TYPE & SCREEN    Collection Time: 07/22/20  2:38 PM   Result Value Ref Range    Crossmatch Expiration 07/25/2020     ABO/Rh(D) A POSITIVE     Antibody screen NEG    SARS-COV-2    Collection Time: 07/22/20  5:41 PM   Result Value Ref Range    SARS-CoV-2 PENDING     Specimen source Nasopharyngeal     D DIMER    Collection Time: 07/22/20  9:48 PM   Result Value Ref Range    D DIMER <0.27 <0.46 ug/ml(FEU)   PROTHROMBIN TIME + INR    Collection Time: 07/22/20  9:48 PM   Result Value Ref Range    Prothrombin time 13.8 11.5 - 15.2 sec    INR 1.1 0.8 - 1.2     PTT    Collection Time: 07/22/20  9:48 PM   Result Value Ref Range    aPTT 22.4 (L) 23.0 - 36.4 SEC   FIBRINOGEN    Collection Time: 07/22/20  9:48 PM   Result Value Ref Range    Fibrinogen 541 (H) 210 - 451 mg/dL   GLUCOSE, POC    Collection Time: 07/22/20 10:29 PM   Result Value Ref Range    Glucose (POC) 300 (H) 70 - 110 mg/dL     ABG:  No results found for: PH, PHI, PCO2, PCO2I, PO2, PO2I, HCO3, HCO3I, FIO2, FIO2I      PFT Results  (Last 48 hours)    None        Echo Results  (Last 48 hours)    None        Imaging:  I have personally reviewed the patients radiographs and have reviewed the reports:  Chest x-ray from today shows bilateral interstitial infiltrates consistent with multifocal pneumonia from COVID-19 sepsis and possible fluid overload  CXR Results  (Last 48 hours)               07/22/20 1250  XR CHEST PORT Final result    Impression:  Impression:        1. Bilateral interstitial and airspace opacities, concerning for multifocal   infiltrate, including possibility of Covid 19 pneumonia. Superimposed edema not   excluded. Narrative:  AP CHEST, PORTABLE       INDICATION: Dyspnea. Positive for Covid 19       COMPARISON: Prior chest x-rays, most recent 7/12/2020       FINDINGS:  EKG leads overlie the patient. Cardiac silhouette is mildly enlarged. Atherosclerosis noted. Mild diffuse   interstitial prominence. Patchy opacities at the mid to lower lungs, with more   peripheral prominence. No definite pleural effusion or pneumothorax. No acute   osseous abnormalities are identified. CT Results  (Last 48 hours)    None            High complexity decision making was performed during the evaluation of this patient at high risk for decompensation with multiple organ involvement     Above mentioned total time spent on reviewing the case/medical record/data/notes/EMR/patient examination/documentation/coordinating care with nurse/consultants, exclusive of procedures with complex decision making performed and > 50% time spent in face to face evaluation.          Davy West MD/MPH     Pulmonary, Critical Care Medicine  [facility]

## 2020-07-23 NOTE — PROGRESS NOTES
Problem: Mobility Impaired (Adult and Pediatric)  Goal: *Acute Goals and Plan of Care (Insert Text)  Description: Physical Therapy Goals  Initiated 7/23/2020 and to be accomplished within 7 day(s)  1. Patient will transfer from bed to chair and chair to bed with modified independence using the least restrictive device. 2.  Patient will perform sit to stand with modified independence. 3.  Patient will ambulate with modified independence for 40 feet with the least restrictive device. 4.  Assess as needed. PLOF: Patient was independent with self care and mobility. She lives with daughter and grandchildren. She will use RW as needed. Outcome: Progressing Towards Goal     PHYSICAL THERAPY EVALUATION    Patient: Debora Velasquez (28 y.o. female)  Date: 7/23/2020  Primary Diagnosis: COPD with acute exacerbation ([facility] [J44.1]  COVID-19 virus infection [U07.1]  Acute respiratory disease due to COVID-19 virus [U07.1, J06.9]        Precautions:   Contact, Other (comment)(droplet plus)    ASSESSMENT :  Based on the objective data described below, the patient presents with decreased strength and decreased endurance impacting ability to perform ADL's and functional mobility. Patient received semi reclined on stretcher agreeable to PT session. She expresses that she feels SOB and feels heavy chest sensation. She performs supine ti sit transfer at modified independence, required frequent rest breaks and reviewed pursed lip breathing. Patient performs sit to stand at supervision level only able to take about 3 -4 steps forward/backward due to SOB and impaired endurance. Patient will benefit from skilled PT in acute setting to maximize safety and independence with functional mobility. At end of session, patient left sitting at edge of stretcher with call bell within reach. Patient will benefit from skilled intervention to address the above impairments.   Patient's rehabilitation potential is considered to be Good  Factors which may influence rehabilitation potential include:   []         None noted  []         Mental ability/status  [x]         Medical condition  []         Home/family situation and support systems  []         Safety awareness  []         Pain tolerance/management  []         Other:      PLAN :  Recommendations and Planned Interventions:   []           Bed Mobility Training             [x]    Neuromuscular Re-Education  [x]           Transfer Training                   []    Orthotic/Prosthetic Training  [x]           Gait Training                          []    Modalities  [x]           Therapeutic Exercises           []    Edema Management/Control  [x]           Therapeutic Activities            []    Family Training/Education  [x]           Patient Education  []           Other (comment):    Frequency/Duration: Patient will be followed by physical therapy 1-2 times per day/4-7 days per week to address goals. Discharge Recommendations: Home Health  Further Equipment Recommendations for Discharge: N/A     SUBJECTIVE:   Patient stated It feels like I was hit by a truck .     OBJECTIVE DATA SUMMARY:     Past Medical History:   Diagnosis Date    Asthma     Chronic lung disease     COPD     Cystocele, midline     Diabetes mellitus ([facility]     GERD (gastroesophageal reflux disease)     Hidradenitis suppurativa     Hyperlipidemia     Hypertension     SHERRIE on CPAP     CPAP    Stress incontinence      Past Surgical History:   Procedure Laterality Date    BREAST SURGERY PROCEDURE UNLISTED      Right breast benign tumor removal    HX APPENDECTOMY      HX CHOLECYSTECTOMY      HX DILATION AND CURETTAGE      Dysfunctional uterine bleeding, thought 2/2 fibroids    HX TUBAL LIGATION       Barriers to Learning/Limitations: None  Compensate with: N/A  Home Situation:     Critical Behavior:  Neurologic State: Alert  Orientation Level: Oriented X4  Cognition: Follows commands  Safety/Judgement: Fall prevention Strength:    Strength: Generally decreased, functional          Tone & Sensation:   Tone: Normal  Sensation: Intact       Functional Mobility:  Bed Mobility:     Supine to Sit: Modified independent     Scooting: Modified independent  Transfers:  Sit to Stand: Supervision  Stand to Sit: Supervision           Balance:   Sitting: Intact    Ambulation/Gait Training:  Distance (ft): 3 Feet (ft)  Ambulation - Level of Assistance: Stand-by assistance  Gait Abnormalities: Decreased step clearance  Speed/Pam: Slow  Step Length: Right shortened;Left shortened         Pain:  Pain level pre-treatment: -/10 c/o chest heaviness  Pain level post-treatment: -/10   Pain Intervention(s) : Medication (see MAR); Rest, Ice, Repositioning  Response to intervention: Nurse notified, See doc flow    Activity Tolerance:   Fair  Please refer to the flowsheet for vital signs taken during this treatment. After treatment:   []         Patient left in no apparent distress sitting up in chair  [x]         Patient left in no apparent distress sitting edge of stretcher  [x]         Call bell left within reach  []         Nursing notified  []         Caregiver present  []         Bed alarm activated  []         SCDs applied    COMMUNICATION/EDUCATION:   [x]         Role of Physical Therapy in the acute care setting. [x]         Fall prevention education was provided and the patient/caregiver indicated understanding. [x]         Patient/family have participated as able in goal setting and plan of care. [x]         Patient/family agree to work toward stated goals and plan of care. []         Patient understands intent and goals of therapy, but is neutral about his/her participation. []         Patient is unable to participate in goal setting/plan of care: ongoing with therapy staff.  []         Other:     Thank you for this referral.  Steff Portillo, PT   Time Calculation: 23 mins      Eval Complexity: History: MEDIUM  Complexity : 1-2 comorbidities / personal factors will impact the outcome/ POC Exam:MEDIUM Complexity : 3 Standardized tests and measures addressing body structure, function, activity limitation and / or participation in recreation  Presentation: MEDIUM Complexity : Evolving with changing characteristics  Clinical Decision Making:Medium Complexity    Overall Complexity:MEDIUM

## 2020-07-24 LAB
ANION GAP SERPL CALC-SCNC: 6 MMOL/L (ref 3–18)
BASOPHILS # BLD: 0 K/UL (ref 0–0.1)
BASOPHILS NFR BLD: 0 % (ref 0–2)
BUN SERPL-MCNC: 19 MG/DL (ref 7–18)
BUN/CREAT SERPL: 21 (ref 12–20)
CALCIUM SERPL-MCNC: 9.3 MG/DL (ref 8.5–10.1)
CHLORIDE SERPL-SCNC: 100 MMOL/L (ref 100–111)
CO2 SERPL-SCNC: 30 MMOL/L (ref 21–32)
CREAT SERPL-MCNC: 0.9 MG/DL (ref 0.6–1.3)
DIFFERENTIAL METHOD BLD: ABNORMAL
EOSINOPHIL # BLD: 0 K/UL (ref 0–0.4)
EOSINOPHIL NFR BLD: 0 % (ref 0–5)
ERYTHROCYTE [DISTWIDTH] IN BLOOD BY AUTOMATED COUNT: 13.7 % (ref 11.6–14.5)
GLUCOSE BLD STRIP.AUTO-MCNC: 263 MG/DL (ref 70–110)
GLUCOSE BLD STRIP.AUTO-MCNC: 294 MG/DL (ref 70–110)
GLUCOSE BLD STRIP.AUTO-MCNC: 299 MG/DL (ref 70–110)
GLUCOSE BLD STRIP.AUTO-MCNC: 303 MG/DL (ref 70–110)
GLUCOSE SERPL-MCNC: 285 MG/DL (ref 74–99)
HCT VFR BLD AUTO: 34.4 % (ref 35–45)
HGB BLD-MCNC: 11.3 G/DL (ref 12–16)
LYMPHOCYTES # BLD: 0.8 K/UL (ref 0.9–3.6)
LYMPHOCYTES NFR BLD: 7 % (ref 21–52)
MAGNESIUM SERPL-MCNC: 2 MG/DL (ref 1.6–2.6)
MCH RBC QN AUTO: 30.4 PG (ref 24–34)
MCHC RBC AUTO-ENTMCNC: 32.8 G/DL (ref 31–37)
MCV RBC AUTO: 92.5 FL (ref 74–97)
MONOCYTES # BLD: 0.3 K/UL (ref 0.05–1.2)
MONOCYTES NFR BLD: 2 % (ref 3–10)
NEUTS SEG # BLD: 10.6 K/UL (ref 1.8–8)
NEUTS SEG NFR BLD: 91 % (ref 40–73)
PLATELET # BLD AUTO: 368 K/UL (ref 135–420)
PMV BLD AUTO: 9 FL (ref 9.2–11.8)
POTASSIUM SERPL-SCNC: 4.3 MMOL/L (ref 3.5–5.5)
RBC # BLD AUTO: 3.72 M/UL (ref 4.2–5.3)
SODIUM SERPL-SCNC: 136 MMOL/L (ref 136–145)
WBC # BLD AUTO: 11.8 K/UL (ref 4.6–13.2)

## 2020-07-24 PROCEDURE — 74011250636 HC RX REV CODE- 250/636: Performed by: STUDENT IN AN ORGANIZED HEALTH CARE EDUCATION/TRAINING PROGRAM

## 2020-07-24 PROCEDURE — 74011636637 HC RX REV CODE- 636/637: Performed by: STUDENT IN AN ORGANIZED HEALTH CARE EDUCATION/TRAINING PROGRAM

## 2020-07-24 PROCEDURE — 74011250637 HC RX REV CODE- 250/637: Performed by: INTERNAL MEDICINE

## 2020-07-24 PROCEDURE — 74011250637 HC RX REV CODE- 250/637: Performed by: STUDENT IN AN ORGANIZED HEALTH CARE EDUCATION/TRAINING PROGRAM

## 2020-07-24 PROCEDURE — 85025 COMPLETE CBC W/AUTO DIFF WBC: CPT

## 2020-07-24 PROCEDURE — 83735 ASSAY OF MAGNESIUM: CPT

## 2020-07-24 PROCEDURE — 74011000250 HC RX REV CODE- 250: Performed by: INTERNAL MEDICINE

## 2020-07-24 PROCEDURE — 94660 CPAP INITIATION&MGMT: CPT

## 2020-07-24 PROCEDURE — 82962 GLUCOSE BLOOD TEST: CPT

## 2020-07-24 PROCEDURE — 74011000258 HC RX REV CODE- 258: Performed by: STUDENT IN AN ORGANIZED HEALTH CARE EDUCATION/TRAINING PROGRAM

## 2020-07-24 PROCEDURE — 80048 BASIC METABOLIC PNL TOTAL CA: CPT

## 2020-07-24 PROCEDURE — 65660000000 HC RM CCU STEPDOWN

## 2020-07-24 RX ORDER — PANTOPRAZOLE SODIUM 40 MG/1
40 TABLET, DELAYED RELEASE ORAL
Status: DISCONTINUED | OUTPATIENT
Start: 2020-07-24 | End: 2020-07-27 | Stop reason: HOSPADM

## 2020-07-24 RX ORDER — INSULIN GLARGINE 100 [IU]/ML
45 INJECTION, SOLUTION SUBCUTANEOUS
Status: DISCONTINUED | OUTPATIENT
Start: 2020-07-24 | End: 2020-07-25

## 2020-07-24 RX ADMIN — PIPERACILLIN SODIUM AND TAZOBACTAM SODIUM 4.5 G: 4; .5 INJECTION, POWDER, LYOPHILIZED, FOR SOLUTION INTRAVENOUS at 13:48

## 2020-07-24 RX ADMIN — LISINOPRIL 20 MG: 20 TABLET ORAL at 23:59

## 2020-07-24 RX ADMIN — IPRATROPIUM BROMIDE AND ALBUTEROL SULFATE 3 ML: .5; 3 SOLUTION RESPIRATORY (INHALATION) at 19:02

## 2020-07-24 RX ADMIN — PIPERACILLIN SODIUM AND TAZOBACTAM SODIUM 4.5 G: 4; .5 INJECTION, POWDER, LYOPHILIZED, FOR SOLUTION INTRAVENOUS at 08:05

## 2020-07-24 RX ADMIN — INSULIN LISPRO 6 UNITS: 100 INJECTION, SOLUTION INTRAVENOUS; SUBCUTANEOUS at 22:00

## 2020-07-24 RX ADMIN — PIPERACILLIN SODIUM AND TAZOBACTAM SODIUM 4.5 G: 4; .5 INJECTION, POWDER, LYOPHILIZED, FOR SOLUTION INTRAVENOUS at 19:02

## 2020-07-24 RX ADMIN — INSULIN LISPRO 9 UNITS: 100 INJECTION, SOLUTION INTRAVENOUS; SUBCUTANEOUS at 12:37

## 2020-07-24 RX ADMIN — GUAIFENESIN 1200 MG: 600 TABLET, EXTENDED RELEASE ORAL at 08:09

## 2020-07-24 RX ADMIN — IPRATROPIUM BROMIDE AND ALBUTEROL SULFATE 3 ML: .5; 3 SOLUTION RESPIRATORY (INHALATION) at 00:30

## 2020-07-24 RX ADMIN — ENOXAPARIN SODIUM 40 MG: 40 INJECTION SUBCUTANEOUS at 08:10

## 2020-07-24 RX ADMIN — IPRATROPIUM BROMIDE AND ALBUTEROL SULFATE 3 ML: .5; 3 SOLUTION RESPIRATORY (INHALATION) at 08:10

## 2020-07-24 RX ADMIN — PANTOPRAZOLE 40 MG: 40 TABLET, DELAYED RELEASE ORAL at 00:13

## 2020-07-24 RX ADMIN — ZINC SULFATE 220 MG (50 MG) CAPSULE 1 CAPSULE: CAPSULE at 08:09

## 2020-07-24 RX ADMIN — Medication 1 CAPSULE: at 11:05

## 2020-07-24 RX ADMIN — CHOLECALCIFEROL TAB 25 MCG (1000 UNIT) 2 TABLET: 25 TAB at 08:09

## 2020-07-24 RX ADMIN — IPRATROPIUM BROMIDE AND ALBUTEROL SULFATE 3 ML: .5; 3 SOLUTION RESPIRATORY (INHALATION) at 12:28

## 2020-07-24 RX ADMIN — ASPIRIN 81 MG: 81 TABLET, COATED ORAL at 08:09

## 2020-07-24 RX ADMIN — PIPERACILLIN SODIUM AND TAZOBACTAM SODIUM 4.5 G: 4; .5 INJECTION, POWDER, LYOPHILIZED, FOR SOLUTION INTRAVENOUS at 01:00

## 2020-07-24 RX ADMIN — IPRATROPIUM BROMIDE AND ALBUTEROL SULFATE 3 ML: .5; 3 SOLUTION RESPIRATORY (INHALATION) at 17:15

## 2020-07-24 RX ADMIN — BUDESONIDE 1000 MCG: 1 SUSPENSION RESPIRATORY (INHALATION) at 08:42

## 2020-07-24 RX ADMIN — HYDROCHLOROTHIAZIDE 12.5 MG: 25 TABLET ORAL at 08:09

## 2020-07-24 RX ADMIN — Medication 500 MG: at 23:59

## 2020-07-24 RX ADMIN — FUROSEMIDE 20 MG: 20 TABLET ORAL at 08:08

## 2020-07-24 RX ADMIN — Medication 500 MG: at 08:08

## 2020-07-24 RX ADMIN — METHYLPREDNISOLONE SODIUM SUCCINATE 60 MG: 40 INJECTION, POWDER, FOR SOLUTION INTRAMUSCULAR; INTRAVENOUS at 08:05

## 2020-07-24 RX ADMIN — Medication 15 ML: at 14:00

## 2020-07-24 RX ADMIN — LISINOPRIL 20 MG: 20 TABLET ORAL at 08:09

## 2020-07-24 RX ADMIN — PANTOPRAZOLE 40 MG: 40 TABLET, DELAYED RELEASE ORAL at 08:09

## 2020-07-24 RX ADMIN — ENOXAPARIN SODIUM 40 MG: 40 INJECTION SUBCUTANEOUS at 03:30

## 2020-07-24 RX ADMIN — INSULIN LISPRO 9 UNITS: 100 INJECTION, SOLUTION INTRAVENOUS; SUBCUTANEOUS at 08:42

## 2020-07-24 RX ADMIN — INSULIN GLARGINE 45 UNITS: 100 INJECTION, SOLUTION SUBCUTANEOUS at 00:22

## 2020-07-24 RX ADMIN — Medication 5 ML: at 22:00

## 2020-07-24 RX ADMIN — INSULIN LISPRO 12 UNITS: 100 INJECTION, SOLUTION INTRAVENOUS; SUBCUTANEOUS at 17:13

## 2020-07-24 NOTE — PROGRESS NOTES
[facility]. Pulmonary, Critical Care, and Sleep Medicine    F/U Patient Consult    Name: Cinthia Cain MRN: [id number]   : 1960 Hospital: [address] [place] [address] Dr   Date: 2020        This patient has been seen and evaluated at the request of Dr. Emma Abarca for severe dyspnea. IMPRESSION:   · Acute exacerbation of COPD/asthma overlap syndrome -- Secondary to COVID-19 pneumonia/sepsis  · Acute on chronic hypoxic and hypercapnic respiratory failure  · Underlying poorly controlled severe persistent asthma with steroid dependence  · COPD, gold risk category D  · COVID-19 pneumonia/severe sepsis:  Pt has severe disease given hospitalization, discharge and worsening of sx  · Worsening dyspnea/SOB:  Etiology multifactorial, due to above  · Morbid obesity:Body mass index is 47.3 kg/m².   · Obesity hypoventilation syndrome with SHERRIE: Patient on trilogy in the [address] [place] [address] setting at home  · Chronic steroid dependence  · Previous smoking hx:  Quit 14 years ago  · CHFpEF  · Pulm HTN:  WHO groups 2 and 3 disease     Patient Active Problem List   Diagnosis Code    Essential hypertension, benign I10    Diabetes mellitus, type 2 ([facility] E11.9    Esophageal reflux K21.9    SHERRIE on CPAP G47.33, Z99.89    COPD (chronic obstructive pulmonary disease) ([facility] J44.9    Allergic rhinitis J30.9    COPD with acute exacerbation ([facility]) J44.1    Obesity, Class III, BMI 40-49.9 (morbid obesity) ([facility]) E66.01    Chest pain R07.9    Dyspnea R06.00    COPD exacerbation ([facility]) J44.1    Acute exacerbation of COPD with asthma ([facility]) J44.1, C92.668    Diastolic CHF, chronic ([facility]) I50.32    Diverticulosis K57.90    COPD with acute bronchitis ([facility]) J44.0, J20.9    Hyperlipidemia E78.5    Type 2 diabetes with nephropathy ([facility]) E11.21    Bilateral carotid bruits R09.89    Palpitations R00.2    Acute exacerbation of chronic obstructive pulmonary disease (COPD) ([facility]) J44.1    Atelectasis of right lung J98.11    Hypoxia R09.02    COVID-19 U07.1    Hypokalemia E87.6    COVID-19 virus infection U07.1    Acute respiratory disease due to COVID-19 virus U07.1, J06.9      RECOMMENDATIONS:   Patient already received course of Remdesevir, no further indication for additional doses  F/U repeat COVID-19 testing, sputum cx, serum procalcitonin and other inflammatory markers  Continue scheduled bronchodilators: duonebs q4h, pulmicort nebs 1mg BID, and albuterol PRN -- discussed with RT and nursing, advised of utmost importance due to ongoing bronchospasm  Please hold home bronchodilators. May resume on discharge  Continue IV steroids - 60mg BID. Wean as tolerated  Maintain net negative fluid balance as renal function tolerates. Diuresis per primary service  Supplemental oxygen to maintain SpO2 >88%  Bipap-ST QHS and PRN -- can do AVAPS-ST mode if possible  Please assess for home oxygen need prior to discharge  Aggressive pulmonary toileting/bronchial hygiene  Frequent incentive spirometry  Aspiration precautions including elevating HOB >30deg  PT/OT, OOB, ambulate with assistance as tolerated  DVT ppx per primary service -- advise high intensity ppx with lovenox SQ 40mg BID  GI ppx due to high dose steroids  Will follow    Prognosis guarded     Subjective:   07/23/20  Patient seen and examined at bedside this afternoon. No acute events overnight. Patient reports that she continues to have ongoing dyspnea and bronchospasm with wheezing. Chest tightness, cough. Patient reports that symptoms are slightly better, patient is only received 1 nebulizer treatment so far.   Patient denies nausea, vomiting, diarrhea, hemoptysis        HPI:   Patient is a 60 y.o. female with a past medical history of/overlap syndrome, chronic steroid dependence, obesity hypoventilation syndrome on home trilogy machine, morbid obesity, CHFpEF, chronic hypoxic respiratory failure presented to [facility] with complaints of worsening shortness of breath and dyspnea. Patient was recently discharged from [facility] last week for hospitalization for COVID-19 pneumonia/sepsis. During that hospitalization, patient received Remdesevir, IV steroids, convalescent plasma. Patient was discharged on a prednisone taper, notes that she was using nebulizer twice on the weekend, but then did not use it for the next few days, and noted worsening shortness of breath and dyspnea. Patient reports that although she was using her home inhalers and was taking prednisone, she reports that she continued to worsen. Dyspnea was present at rest, patient found it very difficult to breathe, so she presented back to the ER. Patient was admitted by the family medicine service who consulted me. Of note, patient did a telehealth visit with her PCP earlier today, and was advised to go to the ER. Patient was reporting shortness of breath with intermittent cough, fatigue, lower extremity pain, with no alleviating factors. Patient reports feeling very poorly, reports that very difficult for her to walk and breathe. Bilateral lower extremity duplex was done in the evening, prelim report was negative. Patient denies any hemoptysis, chest pain, vomiting, diarrhea, worsening LE swelling    Past Medical History:   Diagnosis Date    Asthma     Chronic lung disease     COPD     Cystocele, midline     Diabetes mellitus ([address]     GERD (gastroesophageal reflux disease)     Hidradenitis suppurativa     Hyperlipidemia     Hypertension     SHERRIE on CPAP     CPAP    Stress incontinence       Past Surgical History:   Procedure Laterality Date    BREAST SURGERY PROCEDURE UNLISTED      Right breast benign tumor removal    HX APPENDECTOMY      HX CHOLECYSTECTOMY      HX DILATION AND CURETTAGE      Dysfunctional uterine bleeding, thought 2/2 fibroids    HX TUBAL LIGATION        Prior to Admission medications    Medication Sig Start Date End Date Taking? Authorizing Provider   predniSONE (DELTASONE) 20 mg tablet Take 40 mg by mouth daily (with breakfast) for 7 days, THEN 20 mg daily (with breakfast) for 7 days. 7/19/20 8/2/20 Yes Luke Lan MD   fluticasone propionate (Flovent HFA) 220 mcg/actuation inhaler Take 1 Puff by inhalation two (2) times a day. 7/7/20  Yes Evonne Kate PA-C   albuterol-ipratropium (DUO-NEB) 2.5 mg-0.5 mg/3 ml nebu 3 mL by Nebulization route every four (4) hours as needed for Wheezing. 7/7/20  Yes Evonne Kate PA-C   albuterol (PROVENTIL HFA, VENTOLIN HFA, PROAIR HFA) 90 mcg/actuation inhaler Take 2 Puffs by inhalation every four (4) hours as needed for Wheezing. 7/7/20  Yes Evonne Kate PA-C   azithromycin [facility]) 250 mg tablet Take 1 Tab by mouth every Monday, Wednesday, Friday. Monday-Friday. 7/3/20  Yes Michaela South MD   omeprazole (PRILOSEC) 40 mg capsule Take  by mouth. 3/9/18  Yes Provider, Historical   insulin glargine (Basaglar KwikPen U-100 Insulin) 100 unit/mL (3 mL) inpn 45 Units by SubCUTAneous route nightly. Yes Provider, Historical   furosemide (Lasix) 20 mg tablet Take 20 mg by mouth daily. Yes Provider, Historical   lactobacillus sp. 50 billion cpu (BIO-K PLUS) 50 billion cell -375 mg cap capsule Take 1 Cap by mouth daily. 3/10/20  Yes Polo Washington MD   simethicone (GAS-X) 125 mg capsule Take 125 mg by mouth four (4) times daily as needed for Flatulence. Yes Provider, Historical   loratadine (CLARITIN) 10 mg tablet Take 10 mg by mouth daily as needed for Allergies. Yes Provider, Historical   lisinopril (PRINIVIL, ZESTRIL) 20 mg tablet Take 20 mg by mouth two (2) times a day. Yes Provider, Historical   fluticasone propion-salmeterol (ADVAIR HFA) 230-21 mcg/actuation inhaler Take 2 Puffs by inhalation two (2) times a day. Yes Provider, Historical   hydroCHLOROthiazide (HYDRODIURIL) 12.5 mg tablet Take 12.5 mg by mouth daily.    Yes Provider, Historical   tiotropium bromide (SPIRIVA RESPIMAT) 2.5 mcg/actuation inhaler Take 2 Puffs by inhalation daily. Yes Provider, Historical   NOVOLOG FLEXPEN U-100 INSULIN 100 unit/mL inpn Continue home Sliding scale insulin as prior to admission  Patient taking differently: 1 Units by SubCUTAneous route three (3) times daily. If BG <100=0u  101-150=5u  151-250=8u  251-300=12u  >300 call MD   7/10/18  Yes Vivian Butts MD   OXYGEN-AIR DELIVERY SYSTEMS 2 L by Nasal route continuous. First Choice   Yes Provider, Historical   aspirin delayed-release 81 mg tablet Take 81 mg by mouth daily. Yes Provider, Historical   montelukast (SINGULAIR) 10 mg tablet Take 10 mg by mouth nightly. Yes Other, MD Lauren     Allergies   Allergen Reactions    Ancef [Cefazolin] Hives    Contrast Agent [Iodine] Anaphylaxis, Shortness of Breath and Swelling     Needs pre-medication for IV contrast with Benadryl, Solu-Medrol    Fish Containing Products Anaphylaxis     Pt states she had a severe allergic reaction at 7 y/o.  Statins-Hmg-Coa Reductase Inhibitors Myalgia    Metformin Other (comments)     Abdominal pain, diarrhea.     Codeine Other (comments)     Altered mental status      Social History     Tobacco Use    Smoking status: Former Smoker     Packs/day: 1.00     Years: 30.00     Pack years: 30.00     Types: Cigarettes     Start date: 1966     Last attempt to quit: 2006     Years since quittin.8    Smokeless tobacco: Former User    Tobacco comment: 1-1.5 packs per day   Substance Use Topics    Alcohol use: No      Family History   Problem Relation Age of Onset    Hypertension Mother     Stroke Mother     Breast Cancer Mother         Bilateral mastectomies    Cancer Mother         ovarian and breast    Heart Failure Mother     Heart Attack Father         2011    Heart Surgery Father         CABG    Heart Failure Father     COPD Sister         Heavy smoker    Cancer Sister         ovarian    Heart Failure Sister     Lung Disease Sister  Asthma Child     Cancer Maternal Aunt         pancreatic    Cancer Maternal Grandfather         stomach        Current Facility-Administered Medications   Medication Dose Route Frequency    methylPREDNISolone (PF) (SOLU-MEDROL) injection 60 mg  60 mg IntraVENous Q12H    piperacillin-tazobactam (ZOSYN) 4.5 g in 0.9% sodium chloride (MBP/ADV) 100 mL MBP  4.5 g IntraVENous Q6H    aspirin delayed-release tablet 81 mg  81 mg Oral DAILY    fluticasone furoate (ARNUITY ELLIPTA) 100 mcg/puff  2 Puff Inhalation DAILY    furosemide (LASIX) tablet 20 mg  20 mg Oral DAILY    hydroCHLOROthiazide (HYDRODIURIL) tablet 12.5 mg  12.5 mg Oral DAILY    lactobacillus sp. 50 billion cpu (BIO-K PLUS) capsule 1 Cap  1 Cap Oral DAILY    lisinopriL (PRINIVIL, ZESTRIL) tablet 20 mg  20 mg Oral BID    montelukast (SINGULAIR) tablet 10 mg  10 mg Oral QHS    sodium chloride (NS) flush 5-40 mL  5-40 mL IntraVENous Q8H    insulin glargine (LANTUS) injection 45 Units  45 Units SubCUTAneous DAILY    insulin lispro (HUMALOG) injection   SubCUTAneous AC&HS    ascorbic acid (vitamin C) (VITAMIN C) tablet 500 mg  500 mg Oral BID    zinc sulfate (ZINCATE) 220 (50) mg capsule 1 Cap  1 Cap Oral DAILY    cholecalciferol (VITAMIN D3) (1000 Units /25 mcg) tablet 2 Tab  2,000 Units Oral DAILY    enoxaparin (LOVENOX) injection 40 mg  40 mg SubCUTAneous Q12H    guaiFENesin ER (MUCINEX) tablet 1,200 mg  1,200 mg Oral Q12H    albuterol-ipratropium (DUO-NEB) 2.5 MG-0.5 MG/3 ML  3 mL Nebulization Q4H RT    budesonide (PULMICORT) 1,000 mcg/2 mL nebulizer susp  1,000 mcg Nebulization BID RT       Review of Systems:  A comprehensive ROS was obtained as stated in HPI, all others are negative      Objective:   Vital Signs:    Visit Vitals  /61   Pulse 75   Temp 97.7 °F (36.5 °C)   Resp 25   Ht 5' 3\" (1.6 m)   Wt 121.1 kg (267 lb)   SpO2 94%   BMI 47.30 kg/m²       O2 Device: Nasal cannula   O2 Flow Rate (L/min): 4 l/min   No data recorded. Intake/Output:   Last shift:      No intake/output data recorded. Last 3 shifts: No intake/output data recorded. No intake or output data in the 24 hours ending 07/23/20 9508   Physical Exam:   General:  Alert, cooperative, mild respiratory distress with labored breathing, laying in stretcher semi-upright, wearing nasal cannula   Head:  Normocephalic, without obvious abnormality, atraumatic. Eyes:  Conjunctivae/corneas clear. ANicteric, PERRLA, EOMI   Nose: Nares normal. Mucosa normal. No drainage or sinus tenderness. Throat: Lips, mucosa dry. NO thrush; poor dentition, no oral lesions, Mallamapti IV   Neck: Supple, symmetrical, trachea midline, no adenopathy, thyroid: no enlargment/tenderness/nodules, no carotid bruit and no JVD. No crepitus   Back:   Symmetric, no curvature, no spine tenderness or flank pain   Lungs:    Very poor air entry bilaterally, scattered wheezes and rhonchi throughout all lung fields   Chest wall:  No tenderness or deformity. NO CREPITUS   Heart:  Regular rate and rhythm, S1, S2 normal, no murmur, click, rub or gallop. Abdomen:   Soft, non-tender. Bowel sounds normal. No masses,  No organomegaly. No paradoxical motion   Extremities: normal, atraumatic, no cyanosis or clubbing. Patient has trace edema bilaterally in lower extremities   Pulses: 1-2+ and symmetric all extremities.    Skin: Skin color, texture, turgor normal. No rashes or lesions   Lymph nodes: Cervical, supraclavicular, and axillary nodes normal.   Neurologic: Grossly nonfocal, strength and coordination grossly intact throughout all extremities, sensation also grossly intact          Data review:   Labs:  Recent Results (from the past 24 hour(s))   GLUCOSE, POC    Collection Time: 07/23/20  9:19 AM   Result Value Ref Range    Glucose (POC) 231 (H) 70 - 110 mg/dL   GLUCOSE, POC    Collection Time: 07/23/20  1:05 PM   Result Value Ref Range    Glucose (POC) 217 (H) 70 - 110 mg/dL   GLUCOSE, POC Collection Time: 07/23/20  6:04 PM   Result Value Ref Range    Glucose (POC) 302 (H) 70 - 110 mg/dL     ABG:  No results found for: PH, PHI, PCO2, PCO2I, PO2, PO2I, HCO3, HCO3I, FIO2, FIO2I      PFT Results  (Last 48 hours)    None        Echo Results  (Last 48 hours)    None        Imaging:  I have personally reviewed the patients radiographs and have reviewed the reports:  Chest x-ray from today shows bilateral interstitial infiltrates consistent with multifocal pneumonia from COVID-19 sepsis and possible fluid overload  CXR Results  (Last 48 hours)               07/23/20 0456  XR CHEST PORT Final result    Impression:  IMPRESSION:       Persistent patchy infiltrates at the mid and lower lungs poorly visualized by   portable technique. Stable cardiomegaly. Pulmonary vascular engorgement       Narrative:  EXAM: PORTABLE CHEST 0456 hours       CLINICAL HISTORY/INDICATION: Persistent shortness of breath, difficulty in   speaking in full sentences on oxygen, decreased oxygen saturation, positive   Covid - 19, history of COPD covid         COMPARISON: Chest x-ray July 22 of July 11, 2020. TECHNIQUE: Single AP view       FINDINGS:        Lung bases are incompletely penetrated secondary to patient's size and body   habitus. There is persistent vague increased opacities at the mid to lower lung   zones without significant change from the exam of the day prior. Pulmonary   vessels are mildly cephalized. Heart is mildly enlarged. 07/22/20 1250  XR CHEST PORT Final result    Impression:  Impression:        1. Bilateral interstitial and airspace opacities, concerning for multifocal   infiltrate, including possibility of Covid 19 pneumonia. Superimposed edema not   excluded. Narrative:  AP CHEST, PORTABLE       INDICATION: Dyspnea. Positive for Covid 19       COMPARISON: Prior chest x-rays, most recent 7/12/2020       FINDINGS:  EKG leads overlie the patient.        Cardiac silhouette is mildly enlarged. Atherosclerosis noted. Mild diffuse   interstitial prominence. Patchy opacities at the mid to lower lungs, with more   peripheral prominence. No definite pleural effusion or pneumothorax. No acute   osseous abnormalities are identified.                CT Results  (Last 48 hours)    None            High complexity decision making was performed during the evaluation of this patient at high risk for decompensation with multiple organ involvement  45 minutes were spent in this patient encounter  Above mentioned total time spent on reviewing the case/medical record/data/notes/EMR/patient examination/documentation/coordinating care with nurse/consultants, exclusive of procedures with complex decision making performed and > 50% time spent in face to face evaluation, and coordination of care         Winston Mora MD/MPH     Pulmonary, [address] [facility]

## 2020-07-24 NOTE — PROGRESS NOTES
[facility]  Progress Note    Patient: Arnulfo Ruth MRN: [id number]   SSN: [id number]  YOB: 1960   Age: 60 y.o. Sex: female      Admit Date: 7/22/2020    LOS: 2 days   Chief Complaint   Patient presents with    Shortness of Breath       Subjective:     Patient is well today, was seen sleeping and on BiPap when seen this AM. Upon waking, she states she was feeling better, and thinks the breathing treatments are helping her considerably. Patient was a little bit dyspneic, but otherwise seemed fine overall, much better breathing, able to speak full sentences, only occasional stops for breathing. Review of Systems   Constitutional: Positive for malaise/fatigue. Negative for chills, diaphoresis, fever and weight loss. HENT: Negative for congestion. Respiratory: Positive for cough, sputum production, shortness of breath and wheezing. Negative for hemoptysis. Cardiovascular: Positive for leg swelling. Negative for chest pain and palpitations. Gastrointestinal: Positive for abdominal pain. Negative for diarrhea, nausea and vomiting. Neurological: Negative for dizziness, seizures, loss of consciousness, weakness and headaches. Objective:     Visit Vitals  /54   Pulse 68   Temp 98.4 °F (36.9 °C)   Resp 18   Ht 5' 3\" (1.6 m)   Wt 121.1 kg (267 lb)   SpO2 95%   BMI 47.30 kg/m²       Physical Exam:   Physical Exam  Constitutional:       General: She is not in acute distress. Appearance: She is obese. She is not ill-appearing, toxic-appearing or diaphoretic. HENT:      Head: Normocephalic and atraumatic. Nose: No congestion. Eyes:      Extraocular Movements: Extraocular movements intact. Pupils: Pupils are equal, round, and reactive to light. Cardiovascular:      Rate and Rhythm: Normal rate and regular rhythm. Pulses: Normal pulses. Heart sounds: No murmur. No friction rub. No gallop.     Pulmonary:      Effort: Tachypnea present. No respiratory distress. Breath sounds: No stridor. Wheezing (reduced from prior) present. No rhonchi or rales. Chest:      Chest wall: No tenderness. Abdominal:      General: Abdomen is flat. Bowel sounds are normal.      Palpations: Abdomen is soft. Neurological:      General: No focal deficit present. Mental Status: She is alert and oriented to person, place, and time. Intake and Output:  Current Shift: No intake/output data recorded. Last three shifts: No intake/output data recorded. Lab/Data Review:  Recent Results (from the past 12 hour(s))   GLUCOSE, POC    Collection Time: 07/24/20  1:24 AM   Result Value Ref Range    Glucose (POC) 294 (H) 70 - 234 mg/dL   METABOLIC PANEL, BASIC    Collection Time: 07/24/20  5:30 AM   Result Value Ref Range    Sodium 136 136 - 145 mmol/L    Potassium 4.3 3.5 - 5.5 mmol/L    Chloride 100 100 - 111 mmol/L    CO2 30 21 - 32 mmol/L    Anion gap 6 3.0 - 18 mmol/L    Glucose 285 (H) 74 - 99 mg/dL    BUN 19 (H) 7.0 - 18 MG/DL    Creatinine 0.90 0.6 - 1.3 MG/DL    BUN/Creatinine ratio 21 (H) 12 - 20      GFR est AA >60 >60 ml/min/1.73m2    GFR est non-AA >60 >60 ml/min/1.73m2    Calcium 9.3 8.5 - 10.1 MG/DL   MAGNESIUM    Collection Time: 07/24/20  5:30 AM   Result Value Ref Range    Magnesium 2.0 1.6 - 2.6 mg/dL   CBC WITH AUTOMATED DIFF    Collection Time: 07/24/20  5:30 AM   Result Value Ref Range    WBC 11.8 4.6 - 13.2 K/uL    RBC 3.72 (L) 4.20 - 5.30 M/uL    HGB 11.3 (L) 12.0 - 16.0 g/dL    HCT 34.4 (L) 35.0 - 45.0 %    MCV 92.5 74.0 - 97.0 FL    MCH 30.4 24.0 - 34.0 PG    MCHC 32.8 31.0 - 37.0 g/dL    RDW 13.7 11.6 - 14.5 %    PLATELET 670 094 - 780 K/uL    MPV 9.0 (L) 9.2 - 11.8 FL    NEUTROPHILS 91 (H) 40 - 73 %    LYMPHOCYTES 7 (L) 21 - 52 %    MONOCYTES 2 (L) 3 - 10 %    EOSINOPHILS 0 0 - 5 %    BASOPHILS 0 0 - 2 %    ABS. NEUTROPHILS 10.6 (H) 1.8 - 8.0 K/UL    ABS. LYMPHOCYTES 0.8 (L) 0.9 - 3.6 K/UL    ABS.  MONOCYTES 0.3 0.05 - 1.2 K/UL    ABS. EOSINOPHILS 0.0 0.0 - 0.4 K/UL    ABS. BASOPHILS 0.0 0.0 - 0.1 K/UL    DF AUTOMATED     GLUCOSE, POC    Collection Time: 07/24/20  8:04 AM   Result Value Ref Range    Glucose (POC) 263 (H) 70 - 110 mg/dL     CXR: similar to one taken yesterday in ED. Knee XRay: No acute fracture or dislocations. Telemetry Yes   Oxygen 3L, bipapQHS     Assessment and Plan:   60 y.o. female with PMH significant COPD on home O2, IDDM2, HTN, HFpEF, SHERRIE on CPAP, GERD, allergic rhinitis, recent Covid infection/acute on chronic hypoxic respiratory failure, now admitted with worsening dyspnea in the setting of recent covid infection.     Acute on chronic hypoxic respiratory failure likely 2/2 Covid, in the setting of chronic COPD/asthma overlapping syndrome, HFpEF. : DDx can include COPD exacerbation, COVID pneumonia, fluid overload/CHF exacerbation. Pt w/ positive covid swab (7/7). Pt with severe underlying lung disease, Severe COPD on max therapy w/ strong asthma component. Hospitalized 5x in 2020 for COPD exacerbations. Recently DCd for COVID, from the service. Felt better, but has had worsening respiratory for several days now. mild cough as well     - Consult Pulm  · Patient already received course of Remdesevir, no further indication for additional doses  · Repeat COVID-19 testing, sputum cx, serum procalcitonin and other inflammatory markers  · Continue scheduled bronchodilators: duonebs q4h, pulmicort nebs 1mg BID, and albuterol PRN -- discussed with RT and nursing, advised of utmost importance due to ongoing bronchospasm  · Please hold home bronchodilators. May resume on discharge  · Maintain net negative fluid balance as renal function tolerates.    - Abx, zosyn  - FU Daily CBC, BMP, COVID labs/coags  - FU ABG now  - Imaging CXR daily  - lovenox COVID VTE prophylaxis (BID 40mg)  - solumedrol 60mg, wean down to q24h  - VitC, VitD, zinc  - pilmicort, arnuity ellipta, singulair  - Guaifenesin  - tessalon perls  - claritin if needed    B/l leg swelling, pain, in setting of COVID positive patient.  -negative physical exam for DVT  -FU b/l PVLs  -on lovenox prophylaxis dose, increase if DVT to treatment dose.      Insulin dependent Type 2 Diabete: Home lantus 45u pm and novolog SSI. -stay at Lantus at 45 U.  -SSI   -Accuchecks ACHS  -Diabetic diet  -Diabetic educator consult  -expect further labile BS due to steroid use     Hypertension, HFpEF: ECHO (5/24/20) YP 07 - 70%. No regional wall motion abnormality. Moderate (grade 2) left ventricular diastolic dysfunction. Mildly dilated left atrium. Pulmonary hypertension. Pulmonary arterial systolic pressure is 61 mmHg. Severely elevated central venous pressure (15+ mmHg); IVC diameter is larger than 21 mm and collapses less than 50% with respiration. SBP in ED elevated  to 140s-170s.  Pt on lisinopril 20mg BID and HCTZ 12.5mg daily at home. Patient endorsing increased swelling in Abdomen and legs, feels bloated.      - Continue Lasix 20mg daily  - Continue Lisinopril 20mg BID  - Continue HCTZ 12.5 mg daily     GERD: Pt takes omeprazole 40mg daily and pepcid for breakthrough symptoms at home.   -Continue protonix 40mg daily  -Simethicone PRN     Morbid obesity  -Diabetic diet       Diet DIET DIABETIC CONSISTENT CARB    DVT Prophylaxis Lovenox   GI Prophylaxis Protonix   Code status Full   Disposition >2 nights     Point of Contact Lorena Espinoza  Relationship: Daughter  [phone]     Felecia Cheng MD, PGY-1   [address]   Intern Pager: [phone]   July 24, 2020, 8:39 AM

## 2020-07-24 NOTE — ED NOTES
Pt resting in stretcher with no signs of distress. VS stable. Plan of care reviewed and pt denies needs or questions at this time. Will continue to monitor. Pharmacy paged regarding missing 9am medications.

## 2020-07-25 ENCOUNTER — APPOINTMENT (OUTPATIENT)
Dept: GENERAL RADIOLOGY | Age: 60
DRG: 177 | End: 2020-07-25
Attending: STUDENT IN AN ORGANIZED HEALTH CARE EDUCATION/TRAINING PROGRAM
Payer: MEDICARE

## 2020-07-25 LAB
ANION GAP SERPL CALC-SCNC: 7 MMOL/L (ref 3–18)
APTT PPP: 27 SEC (ref 23–36.4)
BUN SERPL-MCNC: 18 MG/DL (ref 7–18)
BUN/CREAT SERPL: 18 (ref 12–20)
CALCIUM SERPL-MCNC: 9.5 MG/DL (ref 8.5–10.1)
CHLORIDE SERPL-SCNC: 100 MMOL/L (ref 100–111)
CO2 SERPL-SCNC: 29 MMOL/L (ref 21–32)
CREAT SERPL-MCNC: 1 MG/DL (ref 0.6–1.3)
CRP SERPL-MCNC: 2.4 MG/DL (ref 0–0.3)
D DIMER PPP FEU-MCNC: <0.27 UG/ML(FEU)
FERRITIN SERPL-MCNC: 53 NG/ML (ref 8–388)
FIBRINOGEN PPP-MCNC: 481 MG/DL (ref 210–451)
GLUCOSE BLD STRIP.AUTO-MCNC: 201 MG/DL (ref 70–110)
GLUCOSE BLD STRIP.AUTO-MCNC: 267 MG/DL (ref 70–110)
GLUCOSE BLD STRIP.AUTO-MCNC: 278 MG/DL (ref 70–110)
GLUCOSE BLD STRIP.AUTO-MCNC: 287 MG/DL (ref 70–110)
GLUCOSE BLD STRIP.AUTO-MCNC: 354 MG/DL (ref 70–110)
GLUCOSE SERPL-MCNC: 257 MG/DL (ref 74–99)
INR PPP: 1.1 (ref 0.8–1.2)
LDH SERPL L TO P-CCNC: 220 U/L (ref 81–234)
MAGNESIUM SERPL-MCNC: 1.9 MG/DL (ref 1.6–2.6)
POTASSIUM SERPL-SCNC: 4.1 MMOL/L (ref 3.5–5.5)
PROTHROMBIN TIME: 13.6 SEC (ref 11.5–15.2)
SARS-COV-2, COV2NT: DETECTED
SODIUM SERPL-SCNC: 136 MMOL/L (ref 136–145)
SOURCE, COVRS: ABNORMAL

## 2020-07-25 PROCEDURE — 74011250637 HC RX REV CODE- 250/637: Performed by: STUDENT IN AN ORGANIZED HEALTH CARE EDUCATION/TRAINING PROGRAM

## 2020-07-25 PROCEDURE — 83735 ASSAY OF MAGNESIUM: CPT

## 2020-07-25 PROCEDURE — 74011250636 HC RX REV CODE- 250/636: Performed by: STUDENT IN AN ORGANIZED HEALTH CARE EDUCATION/TRAINING PROGRAM

## 2020-07-25 PROCEDURE — 74011000250 HC RX REV CODE- 250: Performed by: STUDENT IN AN ORGANIZED HEALTH CARE EDUCATION/TRAINING PROGRAM

## 2020-07-25 PROCEDURE — 85610 PROTHROMBIN TIME: CPT

## 2020-07-25 PROCEDURE — 80048 BASIC METABOLIC PNL TOTAL CA: CPT

## 2020-07-25 PROCEDURE — 65660000000 HC RM CCU STEPDOWN

## 2020-07-25 PROCEDURE — 74011636637 HC RX REV CODE- 636/637: Performed by: STUDENT IN AN ORGANIZED HEALTH CARE EDUCATION/TRAINING PROGRAM

## 2020-07-25 PROCEDURE — 85379 FIBRIN DEGRADATION QUANT: CPT

## 2020-07-25 PROCEDURE — 74011000250 HC RX REV CODE- 250: Performed by: INTERNAL MEDICINE

## 2020-07-25 PROCEDURE — 74011000258 HC RX REV CODE- 258: Performed by: STUDENT IN AN ORGANIZED HEALTH CARE EDUCATION/TRAINING PROGRAM

## 2020-07-25 PROCEDURE — 82728 ASSAY OF FERRITIN: CPT

## 2020-07-25 PROCEDURE — 74011250637 HC RX REV CODE- 250/637: Performed by: INTERNAL MEDICINE

## 2020-07-25 PROCEDURE — 85730 THROMBOPLASTIN TIME PARTIAL: CPT

## 2020-07-25 PROCEDURE — 86140 C-REACTIVE PROTEIN: CPT

## 2020-07-25 PROCEDURE — 71045 X-RAY EXAM CHEST 1 VIEW: CPT

## 2020-07-25 PROCEDURE — 83615 LACTATE (LD) (LDH) ENZYME: CPT

## 2020-07-25 PROCEDURE — 85384 FIBRINOGEN ACTIVITY: CPT

## 2020-07-25 PROCEDURE — 82962 GLUCOSE BLOOD TEST: CPT

## 2020-07-25 RX ORDER — PREDNISONE 20 MG/1
40 TABLET ORAL
Status: DISCONTINUED | OUTPATIENT
Start: 2020-07-26 | End: 2020-07-27 | Stop reason: HOSPADM

## 2020-07-25 RX ORDER — AMOXICILLIN AND CLAVULANATE POTASSIUM 875; 125 MG/1; MG/1
1 TABLET, FILM COATED ORAL EVERY 12 HOURS
Status: DISCONTINUED | OUTPATIENT
Start: 2020-07-26 | End: 2020-07-27 | Stop reason: HOSPADM

## 2020-07-25 RX ORDER — INSULIN GLARGINE 100 [IU]/ML
50 INJECTION, SOLUTION SUBCUTANEOUS
Status: DISCONTINUED | OUTPATIENT
Start: 2020-07-25 | End: 2020-07-27 | Stop reason: HOSPADM

## 2020-07-25 RX ADMIN — GUAIFENESIN 1200 MG: 600 TABLET, EXTENDED RELEASE ORAL at 09:09

## 2020-07-25 RX ADMIN — ZINC SULFATE 220 MG (50 MG) CAPSULE 1 CAPSULE: CAPSULE at 09:09

## 2020-07-25 RX ADMIN — INSULIN LISPRO 9 UNITS: 100 INJECTION, SOLUTION INTRAVENOUS; SUBCUTANEOUS at 12:55

## 2020-07-25 RX ADMIN — MONTELUKAST 10 MG: 10 TABLET, FILM COATED ORAL at 00:00

## 2020-07-25 RX ADMIN — GUAIFENESIN 1200 MG: 600 TABLET, EXTENDED RELEASE ORAL at 22:08

## 2020-07-25 RX ADMIN — Medication 10 ML: at 09:10

## 2020-07-25 RX ADMIN — GUAIFENESIN 1200 MG: 600 TABLET, EXTENDED RELEASE ORAL at 00:00

## 2020-07-25 RX ADMIN — INSULIN GLARGINE 50 UNITS: 100 INJECTION, SOLUTION SUBCUTANEOUS at 22:09

## 2020-07-25 RX ADMIN — ENOXAPARIN SODIUM 40 MG: 40 INJECTION SUBCUTANEOUS at 00:01

## 2020-07-25 RX ADMIN — HYDROCHLOROTHIAZIDE 12.5 MG: 25 TABLET ORAL at 09:08

## 2020-07-25 RX ADMIN — INSULIN LISPRO 9 UNITS: 100 INJECTION, SOLUTION INTRAVENOUS; SUBCUTANEOUS at 22:09

## 2020-07-25 RX ADMIN — IPRATROPIUM BROMIDE AND ALBUTEROL SULFATE 3 ML: .5; 3 SOLUTION RESPIRATORY (INHALATION) at 00:00

## 2020-07-25 RX ADMIN — IPRATROPIUM BROMIDE AND ALBUTEROL SULFATE 3 ML: .5; 3 SOLUTION RESPIRATORY (INHALATION) at 16:09

## 2020-07-25 RX ADMIN — Medication 500 MG: at 22:08

## 2020-07-25 RX ADMIN — MONTELUKAST 10 MG: 10 TABLET, FILM COATED ORAL at 22:08

## 2020-07-25 RX ADMIN — INSULIN GLARGINE 45 UNITS: 100 INJECTION, SOLUTION SUBCUTANEOUS at 00:01

## 2020-07-25 RX ADMIN — PANTOPRAZOLE 40 MG: 40 TABLET, DELAYED RELEASE ORAL at 08:50

## 2020-07-25 RX ADMIN — Medication 1 CAPSULE: at 09:12

## 2020-07-25 RX ADMIN — CHOLECALCIFEROL TAB 25 MCG (1000 UNIT) 2 TABLET: 25 TAB at 09:09

## 2020-07-25 RX ADMIN — IPRATROPIUM BROMIDE AND ALBUTEROL SULFATE 3 ML: .5; 3 SOLUTION RESPIRATORY (INHALATION) at 22:08

## 2020-07-25 RX ADMIN — LISINOPRIL 20 MG: 20 TABLET ORAL at 22:09

## 2020-07-25 RX ADMIN — PIPERACILLIN SODIUM AND TAZOBACTAM SODIUM 4.5 G: 4; .5 INJECTION, POWDER, LYOPHILIZED, FOR SOLUTION INTRAVENOUS at 08:49

## 2020-07-25 RX ADMIN — IPRATROPIUM BROMIDE AND ALBUTEROL SULFATE 3 ML: .5; 3 SOLUTION RESPIRATORY (INHALATION) at 00:02

## 2020-07-25 RX ADMIN — LISINOPRIL 20 MG: 20 TABLET ORAL at 09:08

## 2020-07-25 RX ADMIN — INSULIN LISPRO 9 UNITS: 100 INJECTION, SOLUTION INTRAVENOUS; SUBCUTANEOUS at 09:06

## 2020-07-25 RX ADMIN — BUDESONIDE 1000 MCG: 1 SUSPENSION RESPIRATORY (INHALATION) at 00:25

## 2020-07-25 RX ADMIN — IPRATROPIUM BROMIDE AND ALBUTEROL SULFATE 3 ML: .5; 3 SOLUTION RESPIRATORY (INHALATION) at 12:58

## 2020-07-25 RX ADMIN — ASPIRIN 81 MG: 81 TABLET, COATED ORAL at 09:08

## 2020-07-25 RX ADMIN — ENOXAPARIN SODIUM 40 MG: 40 INJECTION SUBCUTANEOUS at 09:07

## 2020-07-25 RX ADMIN — BUDESONIDE 1000 MCG: 1 SUSPENSION RESPIRATORY (INHALATION) at 22:09

## 2020-07-25 RX ADMIN — Medication 500 MG: at 09:09

## 2020-07-25 RX ADMIN — INSULIN LISPRO 15 UNITS: 100 INJECTION, SOLUTION INTRAVENOUS; SUBCUTANEOUS at 17:27

## 2020-07-25 RX ADMIN — BUDESONIDE 1000 MCG: 1 SUSPENSION RESPIRATORY (INHALATION) at 10:36

## 2020-07-25 RX ADMIN — FUROSEMIDE 20 MG: 20 TABLET ORAL at 09:08

## 2020-07-25 RX ADMIN — METHYLPREDNISOLONE SODIUM SUCCINATE 40 MG: 40 INJECTION, POWDER, FOR SOLUTION INTRAMUSCULAR; INTRAVENOUS at 00:01

## 2020-07-25 RX ADMIN — METHYLPREDNISOLONE SODIUM SUCCINATE 40 MG: 40 INJECTION, POWDER, FOR SOLUTION INTRAMUSCULAR; INTRAVENOUS at 09:09

## 2020-07-25 RX ADMIN — Medication 5 ML: at 14:00

## 2020-07-25 RX ADMIN — ENOXAPARIN SODIUM 40 MG: 40 INJECTION SUBCUTANEOUS at 22:07

## 2020-07-25 RX ADMIN — IPRATROPIUM BROMIDE AND ALBUTEROL SULFATE 3 ML: .5; 3 SOLUTION RESPIRATORY (INHALATION) at 08:50

## 2020-07-25 RX ADMIN — PIPERACILLIN SODIUM AND TAZOBACTAM SODIUM 4.5 G: 4; .5 INJECTION, POWDER, LYOPHILIZED, FOR SOLUTION INTRAVENOUS at 00:23

## 2020-07-25 NOTE — PROGRESS NOTES
[facility]. Pulmonary, Critical Care, and Sleep Medicine    F/U Patient Consult    Name: Diane Fernandez MRN: [id number]   : 1960 Hospital: [address] [place] [address]   Date: 2020        This patient has been seen and evaluated at the request of Dr. Renuka Tomas for severe dyspnea. IMPRESSION:   · Acute exacerbation of COPD/asthma overlap syndrome -- Secondary to COVID-19 pneumonia/sepsis  · Acute on chronic hypoxic and hypercapnic respiratory failure  · Underlying poorly controlled severe persistent asthma with steroid dependence  · COPD, gold risk category D  · COVID-19 pneumonia/severe sepsis:  Pt has severe disease given hospitalization, discharge and worsening of sx  · Worsening dyspnea/SOB:  Etiology multifactorial, due to above  · Morbid obesity:Body mass index is 47.3 kg/m².   · Obesity hypoventilation syndrome with SHERRIE: Patient on trilogy in the [address] [place] [address] setting at home  · Chronic steroid dependence  · Previous smoking hx:  Quit 14 years ago  · CHFpEF  · Pulm HTN:  WHO groups 2 and 3 disease     Patient Active Problem List   Diagnosis Code    Essential hypertension, benign I10    Diabetes mellitus, type 2 ([facility] E11.9    Esophageal reflux K21.9    SHERRIE on CPAP G47.33, Z99.89    COPD (chronic obstructive pulmonary disease) ([facility] J44.9    Allergic rhinitis J30.9    COPD with acute exacerbation ([facility]) J44.1    Obesity, Class III, BMI 40-49.9 (morbid obesity) ([facility]) E66.01    Chest pain R07.9    Dyspnea R06.00    COPD exacerbation ([facility]) J44.1    Acute exacerbation of COPD with asthma ([facility]) J44.1, S26.886    Diastolic CHF, chronic ([facility]) I50.32    Diverticulosis K57.90    COPD with acute bronchitis ([facility]) J44.0, J20.9    Hyperlipidemia E78.5    Type 2 diabetes with nephropathy ([facility]) E11.21    Bilateral carotid bruits R09.89    Palpitations R00.2    Acute exacerbation of chronic obstructive pulmonary disease (COPD) ([facility]) J44.1    Atelectasis of right lung J98.11    Hypoxia R09.02    COVID-19 U07.1    Hypokalemia E87.6    COVID-19 virus infection U07.1    Acute respiratory disease due to COVID-19 virus U07.1, J06.9      RECOMMENDATIONS:   Patient already received course of Remdesevir, no further indication for additional doses  F/U repeat COVID-19 testing, sputum cx, and other inflammatory markers  Continue scheduled bronchodilators: duonebs q4h, pulmicort nebs 1mg BID, and albuterol PRN  Please hold home bronchodilators. May resume on discharge - Advair, Flovent and Spiriva  Continue IV steroids - weaned down to 40mg BID. Can switch to PO prednisone 40mg daily starting tomorrow -- will determine length of taper based on response, will eventually taper to chronic dose of 5mg daily. Maintain net negative fluid balance as renal function tolerates. Diuresis per primary service  Antibiotics per primary service. With low procalcitonin and no leukocytosis, advise weaning antibiotics rapidly, consider discontinuing versus 3 additional days of Cefpodoxime 200 mg twice daily  Supplemental oxygen to maintain SpO2 >88%  Bipap-ST QHS and PRN -- can do AVAPS-ST mode if possible  Please assess for home oxygen need prior to discharge  Aggressive pulmonary toileting/bronchial hygiene  Frequent incentive spirometry  Aspiration precautions including elevating HOB >30deg  PT/OT, OOB, ambulate with assistance as tolerated  DVT ppx per primary service -- advise high intensity ppx with lovenox SQ 40mg BID  GI ppx due to high dose steroids -- continue as outpatient until weaned off of steroids  F/U with infusion center for benralizumab therapy as an outpatient once infection has cleared  Discussed with primary service  Will follow     Subjective:   07/24/20  Patient seen and examined at bedside this afternoon. No acute events overnight. Reports that she is receiving breathing treatments, and notes that it is starting to improve her breathing.   Patient notes that she can feel chest tightness when effective bronchodilator is worn off, and when she is due for the next treatment. Patient continues to report ongoing cough, which is slowly improving. Patient reports no worsening of sputum, no hemoptysis. Denies any nausea, vomiting, diarrhea      HPI:   Patient is a 60 y.o. female with a past medical history of/overlap syndrome, chronic steroid dependence, obesity hypoventilation syndrome on home trilogy machine, morbid obesity, CHFpEF, chronic hypoxic respiratory failure presented to [facility]. [facility] with complaints of worsening shortness of breath and dyspnea. Patient was recently discharged from [facility] last week for hospitalization for COVID-19 pneumonia/sepsis. During that hospitalization, patient received Remdesevir, IV steroids, convalescent plasma. Patient was discharged on a prednisone taper, notes that she was using nebulizer twice on the weekend, but then did not use it for the next few days, and noted worsening shortness of breath and dyspnea. Patient reports that although she was using her home inhalers and was taking prednisone, she reports that she continued to worsen. Dyspnea was present at rest, patient found it very difficult to breathe, so she presented back to the ER. Patient was admitted by the family medicine service who consulted me. Of note, patient did a telehealth visit with her PCP earlier today, and was advised to go to the ER. Patient was reporting shortness of breath with intermittent cough, fatigue, lower extremity pain, with no alleviating factors. Patient reports feeling very poorly, reports that very difficult for her to walk and breathe. Bilateral lower extremity duplex was done in the evening, prelim report was negative.   Patient denies any hemoptysis, chest pain, vomiting, diarrhea, worsening LE swelling    Past Medical History:   Diagnosis Date    Asthma     Chronic lung disease     COPD     Cystocele, midline     Diabetes mellitus ([facility]     GERD (gastroesophageal reflux disease)     Hidradenitis suppurativa     Hyperlipidemia     Hypertension     SHERRIE on CPAP     CPAP    Stress incontinence       Past Surgical History:   Procedure Laterality Date    BREAST SURGERY PROCEDURE UNLISTED      Right breast benign tumor removal    HX APPENDECTOMY      HX CHOLECYSTECTOMY      HX DILATION AND CURETTAGE      Dysfunctional uterine bleeding, thought 2/2 fibroids    HX TUBAL LIGATION        Prior to Admission medications    Medication Sig Start Date End Date Taking? Authorizing Provider   predniSONE (DELTASONE) 20 mg tablet Take 40 mg by mouth daily (with breakfast) for 7 days, THEN 20 mg daily (with breakfast) for 7 days. 7/19/20 8/2/20 Yes Aren Xiao MD   fluticasone propionate (Flovent HFA) 220 mcg/actuation inhaler Take 1 Puff by inhalation two (2) times a day. 7/7/20  Yes Evonne Kate PA-C   albuterol-ipratropium (DUO-NEB) 2.5 mg-0.5 mg/3 ml nebu 3 mL by Nebulization route every four (4) hours as needed for Wheezing. 7/7/20  Yes Evonne Kate PA-C   albuterol (PROVENTIL HFA, VENTOLIN HFA, PROAIR HFA) 90 mcg/actuation inhaler Take 2 Puffs by inhalation every four (4) hours as needed for Wheezing. 7/7/20  Yes Evonne Kate PA-C   azithromycin [facility]) 250 mg tablet Take 1 Tab by mouth every Monday, Wednesday, Friday. Monday-Friday. 7/3/20  Yes Michelle Pabon MD   omeprazole (PRILOSEC) 40 mg capsule Take  by mouth. 3/9/18  Yes Provider, Historical   insulin glargine (Basaglar KwikPen U-100 Insulin) 100 unit/mL (3 mL) inpn 45 Units by SubCUTAneous route nightly. Yes Provider, Historical   furosemide (Lasix) 20 mg tablet Take 20 mg by mouth daily. Yes Provider, Historical   lactobacillus sp. 50 billion cpu (BIO-K PLUS) 50 billion cell -375 mg cap capsule Take 1 Cap by mouth daily.  3/10/20  Yes Saima Emmanuel MD   simethicone (GAS-X) 125 mg capsule Take 125 mg by mouth four (4) times daily as needed for Flatulence. Yes Provider, Historical   loratadine (CLARITIN) 10 mg tablet Take 10 mg by mouth daily as needed for Allergies. Yes Provider, Historical   lisinopril (PRINIVIL, ZESTRIL) 20 mg tablet Take 20 mg by mouth two (2) times a day. Yes Provider, Historical   fluticasone propion-salmeterol (ADVAIR HFA) 230-21 mcg/actuation inhaler Take 2 Puffs by inhalation two (2) times a day. Yes Provider, Historical   hydroCHLOROthiazide (HYDRODIURIL) 12.5 mg tablet Take 12.5 mg by mouth daily. Yes Provider, Historical   tiotropium bromide (SPIRIVA RESPIMAT) 2.5 mcg/actuation inhaler Take 2 Puffs by inhalation daily. Yes Provider, Historical   NOVOLOG FLEXPEN U-100 INSULIN 100 unit/mL inpn Continue home Sliding scale insulin as prior to admission  Patient taking differently: 1 Units by SubCUTAneous route three (3) times daily. If BG <100=0u  101-150=5u  151-250=8u  251-300=12u  >300 call MD   7/10/18  Yes Eugenio Blum MD   OXYGEN-AIR DELIVERY SYSTEMS 2 L by Nasal route continuous. [employer]   Yes Provider, Historical   aspirin delayed-release 81 mg tablet Take 81 mg by mouth daily. Yes Provider, Historical   montelukast (SINGULAIR) 10 mg tablet Take 10 mg by mouth nightly. Yes Other, MD Lauren     Allergies   Allergen Reactions    Ancef [Cefazolin] Hives    Contrast Agent [Iodine] Anaphylaxis, Shortness of Breath and Swelling     Needs pre-medication for IV contrast with Benadryl, Solu-Medrol    Fish Containing Products Anaphylaxis     Pt states she had a severe allergic reaction at 7 y/o.  Statins-Hmg-Coa Reductase Inhibitors Myalgia    Metformin Other (comments)     Abdominal pain, diarrhea.     Codeine Other (comments)     Altered mental status      Social History     Tobacco Use    Smoking status: Former Smoker     Packs/day: 1.00     Years: 30.00     Pack years: 30.00     Types: Cigarettes     Start date: 1966     Last attempt to quit: 2006     Years since quittin.8    Smokeless tobacco: Former User    Tobacco comment: 1-1.5 packs per day   Substance Use Topics    Alcohol use: No      Family History   Problem Relation Age of Onset    Hypertension Mother     Stroke Mother     Breast Cancer Mother         Bilateral mastectomies    Cancer Mother         ovarian and breast    Heart Failure Mother     Heart Attack Father         2011    Heart Surgery Father         CABG    Heart Failure Father     COPD Sister         Heavy smoker    Cancer Sister         ovarian    Heart Failure Sister     Lung Disease Sister     Asthma Child     Cancer Maternal Aunt         pancreatic    Cancer Maternal Grandfather         stomach        Current Facility-Administered Medications   Medication Dose Route Frequency    pantoprazole (PROTONIX) tablet 40 mg  40 mg Oral ACB    insulin glargine (LANTUS) injection 45 Units  45 Units SubCUTAneous QHS    methylPREDNISolone (PF) (SOLU-MEDROL) injection 40 mg  40 mg IntraVENous Q12H    piperacillin-tazobactam (ZOSYN) 4.5 g in 0.9% sodium chloride (MBP/ADV) 100 mL MBP  4.5 g IntraVENous Q6H    aspirin delayed-release tablet 81 mg  81 mg Oral DAILY    furosemide (LASIX) tablet 20 mg  20 mg Oral DAILY    hydroCHLOROthiazide (HYDRODIURIL) tablet 12.5 mg  12.5 mg Oral DAILY    lactobacillus sp. 50 billion cpu (BIO-K PLUS) capsule 1 Cap  1 Cap Oral DAILY    lisinopriL (PRINIVIL, ZESTRIL) tablet 20 mg  20 mg Oral BID    montelukast (SINGULAIR) tablet 10 mg  10 mg Oral QHS    sodium chloride (NS) flush 5-40 mL  5-40 mL IntraVENous Q8H    insulin lispro (HUMALOG) injection   SubCUTAneous AC&HS    ascorbic acid (vitamin C) (VITAMIN C) tablet 500 mg  500 mg Oral BID    zinc sulfate (ZINCATE) 220 (50) mg capsule 1 Cap  1 Cap Oral DAILY    cholecalciferol (VITAMIN D3) (1000 Units /25 mcg) tablet 2 Tab  2,000 Units Oral DAILY    enoxaparin (LOVENOX) injection 40 mg  40 mg SubCUTAneous Q12H    guaiFENesin ER (MUCINEX) tablet 1,200 mg  1,200 mg Oral Q12H    albuterol-ipratropium (DUO-NEB) 2.5 MG-0.5 MG/3 ML  3 mL Nebulization Q4H RT    budesonide (PULMICORT) 1,000 mcg/2 mL nebulizer susp  1,000 mcg Nebulization BID RT       Review of Systems:  A comprehensive ROS was obtained as stated in HPI, all others are negative      Objective:   Vital Signs:    Visit Vitals  /48   Pulse 68   Temp 98.4 °F (36.9 °C)   Resp 16   Ht 5' 3\" (1.6 m)   Wt 121.1 kg (267 lb)   SpO2 95%   BMI 47.30 kg/m²       O2 Device: Nasal cannula   O2 Flow Rate (L/min): 4 l/min   Temp (24hrs), Av.4 °F (36.9 °C), Min:98.4 °F (36.9 °C), Max:98.4 °F (36.9 °C)       Intake/Output:   Last shift:      No intake/output data recorded. Last 3 shifts: No intake/output data recorded. No intake or output data in the 24 hours ending 20 1147   Physical Exam:   General:  Alert, cooperative, no respiratory distress sitting upright in stretcher with 1 leg off, wearing nasal cannula   Head:  Normocephalic, without obvious abnormality, atraumatic. Eyes:  Conjunctivae/corneas clear. ANicteric, PERRLA, EOMI   Nose: Nares normal. Mucosa normal. No drainage or sinus tenderness. Throat: Lips, mucosa dry. NO thrush; poor dentition, no oral lesions, Mallamapti IV   Neck: Supple, symmetrical, trachea midline, no adenopathy, thyroid: no enlargment/tenderness/nodules, no carotid bruit and no JVD. No crepitus   Lungs:    Very poor air entry bilaterally, scattered wheezes and rhonchi throughout all lung fields   Heart:  Regular rate and rhythm, S1, S2 normal, no murmur, click, rub or gallop. Abdomen:   Soft, obese, non-tender. Bowel sounds normal. No masses,  No organomegaly. No paradoxical motion   Extremities: normal, atraumatic, no cyanosis or clubbing. Patient has trace edema bilaterally in lower extremities   Pulses: 1-2+ and symmetric all extremities.    Skin: Skin color, texture, turgor normal. No rashes or lesions   Lymph nodes: Cervical, supraclavicular, and axillary nodes normal. Neurologic: Grossly nonfocal, strength and coordination grossly intact throughout all extremities, sensation also grossly intact          Data review:   Labs:  Recent Results (from the past 24 hour(s))   GLUCOSE, POC    Collection Time: 07/24/20  1:24 AM   Result Value Ref Range    Glucose (POC) 294 (H) 70 - 081 mg/dL   METABOLIC PANEL, BASIC    Collection Time: 07/24/20  5:30 AM   Result Value Ref Range    Sodium 136 136 - 145 mmol/L    Potassium 4.3 3.5 - 5.5 mmol/L    Chloride 100 100 - 111 mmol/L    CO2 30 21 - 32 mmol/L    Anion gap 6 3.0 - 18 mmol/L    Glucose 285 (H) 74 - 99 mg/dL    BUN 19 (H) 7.0 - 18 MG/DL    Creatinine 0.90 0.6 - 1.3 MG/DL    BUN/Creatinine ratio 21 (H) 12 - 20      GFR est AA >60 >60 ml/min/1.73m2    GFR est non-AA >60 >60 ml/min/1.73m2    Calcium 9.3 8.5 - 10.1 MG/DL   MAGNESIUM    Collection Time: 07/24/20  5:30 AM   Result Value Ref Range    Magnesium 2.0 1.6 - 2.6 mg/dL   CBC WITH AUTOMATED DIFF    Collection Time: 07/24/20  5:30 AM   Result Value Ref Range    WBC 11.8 4.6 - 13.2 K/uL    RBC 3.72 (L) 4.20 - 5.30 M/uL    HGB 11.3 (L) 12.0 - 16.0 g/dL    HCT 34.4 (L) 35.0 - 45.0 %    MCV 92.5 74.0 - 97.0 FL    MCH 30.4 24.0 - 34.0 PG    MCHC 32.8 31.0 - 37.0 g/dL    RDW 13.7 11.6 - 14.5 %    PLATELET 534 753 - 028 K/uL    MPV 9.0 (L) 9.2 - 11.8 FL    NEUTROPHILS 91 (H) 40 - 73 %    LYMPHOCYTES 7 (L) 21 - 52 %    MONOCYTES 2 (L) 3 - 10 %    EOSINOPHILS 0 0 - 5 %    BASOPHILS 0 0 - 2 %    ABS. NEUTROPHILS 10.6 (H) 1.8 - 8.0 K/UL    ABS. LYMPHOCYTES 0.8 (L) 0.9 - 3.6 K/UL    ABS. MONOCYTES 0.3 0.05 - 1.2 K/UL    ABS. EOSINOPHILS 0.0 0.0 - 0.4 K/UL    ABS.  BASOPHILS 0.0 0.0 - 0.1 K/UL    DF AUTOMATED     GLUCOSE, POC    Collection Time: 07/24/20  8:04 AM   Result Value Ref Range    Glucose (POC) 263 (H) 70 - 110 mg/dL   GLUCOSE, POC    Collection Time: 07/24/20 12:30 PM   Result Value Ref Range    Glucose (POC) 299 (H) 70 - 110 mg/dL   GLUCOSE, POC    Collection Time: 07/24/20  5:10 PM Result Value Ref Range    Glucose (POC) 303 (H) 70 - 110 mg/dL     ABG:  No results found for: PH, PHI, PCO2, PCO2I, PO2, PO2I, HCO3, HCO3I, FIO2, FIO2I      PFT Results  (Last 48 hours)    None        Echo Results  (Last 48 hours)    None        Imaging:  I have personally reviewed the patients radiographs and have reviewed the reports:  Chest x-ray from today shows bilateral interstitial infiltrates consistent with multifocal pneumonia from COVID-19 sepsis and possible fluid overload  CXR Results  (Last 48 hours)               07/23/20 0456  XR CHEST PORT Final result    Impression:  IMPRESSION:       Persistent patchy infiltrates at the mid and lower lungs poorly visualized by   portable technique. Stable cardiomegaly. Pulmonary vascular engorgement       Narrative:  EXAM: PORTABLE CHEST 0456 hours       CLINICAL HISTORY/INDICATION: Persistent shortness of breath, difficulty in   speaking in full sentences on oxygen, decreased oxygen saturation, positive   Covid - 19, history of COPD covid         COMPARISON: Chest x-ray July 22 of July 11, 2020. TECHNIQUE: Single AP view       FINDINGS:        Lung bases are incompletely penetrated secondary to patient's size and body   habitus. There is persistent vague increased opacities at the mid to lower lung   zones without significant change from the exam of the day prior. Pulmonary   vessels are mildly cephalized. Heart is mildly enlarged.                CT Results  (Last 48 hours)    None            High complexity decision making was performed during the evaluation of this patient at high risk for decompensation with multiple organ involvement    Above mentioned total time spent on reviewing the case/medical record/data/notes/EMR/patient examination/documentation/coordinating care with nurse/consultants, exclusive of procedures with complex decision making performed and > 50% time spent in face to face evaluation, and coordination of care         [facility] Smiley Light MD/MPH     Pulmonary, Critical Care Medicine  [facility]

## 2020-07-25 NOTE — PROGRESS NOTES
[facility]. Pulmonary, Critical Care, and Sleep Medicine    F/U Patient Consult    Name: Marbella Duque MRN: [id number]   : 1960 Hospital: [facility]   Date: 2020        This patient has been seen and evaluated at the request of Dr. Austin Griffin for severe dyspnea. IMPRESSION:   · Acute exacerbation of COPD/asthma overlap syndrome -- Secondary to COVID-19 pneumonia/sepsis  · Acute on chronic hypoxic and hypercapnic respiratory failure  · Underlying poorly controlled severe persistent asthma with steroid dependence  · COPD, gold risk category D  · COVID-19 pneumonia/severe sepsis:  Pt has severe disease given hospitalization, discharge and worsening of sx  · Worsening dyspnea/SOB:  Etiology multifactorial, due to above  · Morbid obesity:Body mass index is 47.3 kg/m².   · Obesity hypoventilation syndrome with SHERRIE: Patient on trilogy in the [address] [place] [address] setting at home  · Chronic steroid dependence  · Previous smoking hx:  Quit 14 years ago  · CHFpEF  · Pulm HTN:  WHO groups 2 and 3 disease     Patient Active Problem List   Diagnosis Code    Essential hypertension, benign I10    Diabetes mellitus, type 2 ([facility] E11.9    Esophageal reflux K21.9    SHERRIE on CPAP G47.33, Z99.89    COPD (chronic obstructive pulmonary disease) [facility] J44.9    Allergic rhinitis J30.9    COPD with acute exacerbation ([facility]) J44.1    Obesity, Class III, BMI 40-49.9 (morbid obesity) ([facility]) E66.01    Chest pain R07.9    Dyspnea R06.00    COPD exacerbation ([facility]) J44.1    Acute exacerbation of COPD with asthma ([facility]) J44.1, C31.313    Diastolic CHF, chronic ([facility]) I50.32    Diverticulosis K57.90    COPD with acute bronchitis ([facility]) J44.0, J20.9    Hyperlipidemia E78.5    Type 2 diabetes with nephropathy ([facility]) E11.21    Bilateral carotid bruits R09.89    Palpitations R00.2    Acute exacerbation of chronic obstructive pulmonary disease (COPD) ([facility]) J44.1    Atelectasis of right lung J98.11    Hypoxia R09.02    COVID-19 U07.1    Hypokalemia E87.6    COVID-19 virus infection U07.1    Acute respiratory disease due to COVID-19 virus U07.1, J06.9      RECOMMENDATIONS:   Patient already received course of Remdesevir, no further indication for additional doses  F/U repeat COVID-19 testing, sputum cx, and other inflammatory markers  Continue scheduled bronchodilators: duonebs q4h, pulmicort nebs 1mg BID, and albuterol PRN  Please hold home bronchodilators. May resume on discharge - Advair, Flovent and Spiriva  Switch to prednisone today  Maintain net negative fluid balance as renal function tolerates. Diuresis per primary service  Antibiotics per primary service. With low procalcitonin and no leukocytosis, advise weaning antibiotics rapidly  Supplemental oxygen to maintain SpO2 >88%  Bipap-ST QHS and PRN -- can do AVAPS-ST mode if possible  Please assess for home oxygen need prior to discharge  Aggressive pulmonary toileting/bronchial hygiene  Frequent incentive spirometry  Aspiration precautions including elevating HOB >30deg  PT/OT, OOB, ambulate with assistance as tolerated  DVT ppx per primary service -- advise high intensity ppx with lovenox SQ 40mg BID  GI ppx due to high dose steroids -- continue as outpatient until weaned off of steroids  F/U with infusion center for benralizumab therapy as an outpatient once infection has cleared  Discussed with primary service  Will follow     Subjective:   07/25/20  Patient seen and examined at bedside this morning. No acute events overnight. Reports that she is receiving breathing treatments and steroids and dyspnea improving. Patient reports no worsening of sputum, no hemoptysis.   Denies any nausea, vomiting, diarrhea      HPI:   Patient is a 60 y.o. female with a past medical history of/overlap syndrome, chronic steroid dependence, obesity hypoventilation syndrome on home trilogy machine, morbid obesity, CHFpEF, chronic hypoxic respiratory failure presented to [facility] with complaints of worsening shortness of breath and dyspnea. Patient was recently discharged from [facility] last week for hospitalization for COVID-19 pneumonia/sepsis. During that hospitalization, patient received Remdesevir, IV steroids, convalescent plasma. Patient was discharged on a prednisone taper, notes that she was using nebulizer twice on the weekend, but then did not use it for the next few days, and noted worsening shortness of breath and dyspnea. Patient reports that although she was using her home inhalers and was taking prednisone, she reports that she continued to worsen. Dyspnea was present at rest, patient found it very difficult to breathe, so she presented back to the ER. Patient was admitted by the family medicine service who consulted me. Of note, patient did a telehealth visit with her PCP earlier today, and was advised to go to the ER. Patient was reporting shortness of breath with intermittent cough, fatigue, lower extremity pain, with no alleviating factors. Patient reports feeling very poorly, reports that very difficult for her to walk and breathe. Bilateral lower extremity duplex was done in the evening, prelim report was negative.   Patient denies any hemoptysis, chest pain, vomiting, diarrhea, worsening LE swelling    Past Medical History:   Diagnosis Date    Asthma     Chronic lung disease     COPD     Cystocele, midline     Diabetes mellitus ([facility]     GERD (gastroesophageal reflux disease)     Hidradenitis suppurativa     Hyperlipidemia     Hypertension     SHERRIE on CPAP     CPAP    Stress incontinence       Past Surgical History:   Procedure Laterality Date    BREAST SURGERY PROCEDURE UNLISTED      Right breast benign tumor removal    HX APPENDECTOMY      HX CHOLECYSTECTOMY      HX DILATION AND CURETTAGE      Dysfunctional uterine bleeding, thought 2/2 fibroids    HX TUBAL LIGATION        Prior to Admission medications    Medication Sig Start Date End Date Taking? Authorizing Provider   predniSONE (DELTASONE) 20 mg tablet Take 40 mg by mouth daily (with breakfast) for 7 days, THEN 20 mg daily (with breakfast) for 7 days. 7/19/20 8/2/20 Yes Aren Xiao MD   fluticasone propionate (Flovent HFA) 220 mcg/actuation inhaler Take 1 Puff by inhalation two (2) times a day. 7/7/20  Yes Evonne Kate PA-C   albuterol-ipratropium (DUO-NEB) 2.5 mg-0.5 mg/3 ml nebu 3 mL by Nebulization route every four (4) hours as needed for Wheezing. 7/7/20  Yes Evonne Kate PA-C   albuterol (PROVENTIL HFA, VENTOLIN HFA, PROAIR HFA) 90 mcg/actuation inhaler Take 2 Puffs by inhalation every four (4) hours as needed for Wheezing. 7/7/20  Yes Evonne Kate PA-C   azithromycin [facility]) 250 mg tablet Take 1 Tab by mouth every Monday, Wednesday, Friday. Monday-Friday. 7/3/20  Yes Michelle Pabon MD   omeprazole (PRILOSEC) 40 mg capsule Take  by mouth. 3/9/18  Yes Provider, Historical   insulin glargine (Basaglar KwikPen U-100 Insulin) 100 unit/mL (3 mL) inpn 45 Units by SubCUTAneous route nightly. Yes Provider, Historical   furosemide (Lasix) 20 mg tablet Take 20 mg by mouth daily. Yes Provider, Historical   lactobacillus sp. 50 billion cpu (BIO-K PLUS) 50 billion cell -375 mg cap capsule Take 1 Cap by mouth daily. 3/10/20  Yes Samia Emmanuel MD   simethicone (GAS-X) 125 mg capsule Take 125 mg by mouth four (4) times daily as needed for Flatulence. Yes Provider, Historical   loratadine (CLARITIN) 10 mg tablet Take 10 mg by mouth daily as needed for Allergies. Yes Provider, Historical   lisinopril (PRINIVIL, ZESTRIL) 20 mg tablet Take 20 mg by mouth two (2) times a day. Yes Provider, Historical   fluticasone propion-salmeterol (ADVAIR HFA) 230-21 mcg/actuation inhaler Take 2 Puffs by inhalation two (2) times a day.    Yes Provider, Historical   hydroCHLOROthiazide (HYDRODIURIL) 12.5 mg tablet Take 12.5 mg by mouth daily. Yes Provider, Historical   tiotropium bromide (SPIRIVA RESPIMAT) 2.5 mcg/actuation inhaler Take 2 Puffs by inhalation daily. Yes Provider, Historical   NOVOLOG FLEXPEN U-100 INSULIN 100 unit/mL inpn Continue home Sliding scale insulin as prior to admission  Patient taking differently: 1 Units by SubCUTAneous route three (3) times daily. If BG <100=0u  101-150=5u  151-250=8u  251-300=12u  >300 call MD   7/10/18  Yes Damien Ventura MD   OXYGEN-AIR DELIVERY SYSTEMS 2 L by Nasal route continuous. First Choice   Yes Provider, Historical   aspirin delayed-release 81 mg tablet Take 81 mg by mouth daily. Yes Provider, Historical   montelukast (SINGULAIR) 10 mg tablet Take 10 mg by mouth nightly. Yes Other, MD Lauren     Allergies   Allergen Reactions    Ancef [Cefazolin] Hives    Contrast Agent [Iodine] Anaphylaxis, Shortness of Breath and Swelling     Needs pre-medication for IV contrast with Benadryl, Solu-Medrol    Fish Containing Products Anaphylaxis     Pt states she had a severe allergic reaction at 9 y/o.  Statins-Hmg-Coa Reductase Inhibitors Myalgia    Metformin Other (comments)     Abdominal pain, diarrhea.     Codeine Other (comments)     Altered mental status      Social History     Tobacco Use    Smoking status: Former Smoker     Packs/day: 1.00     Years: 30.00     Pack years: 30.00     Types: Cigarettes     Start date: 1966     Last attempt to quit: 2006     Years since quittin.8    Smokeless tobacco: Former User    Tobacco comment: 1-1.5 packs per day   Substance Use Topics    Alcohol use: No      Family History   Problem Relation Age of Onset    Hypertension Mother     Stroke Mother     Breast Cancer Mother         Bilateral mastectomies    Cancer Mother         ovarian and breast    Heart Failure Mother     Heart Attack Father         2011    Heart Surgery Father         CABG    Heart Failure Father     COPD Sister         Heavy smoker    Cancer Sister ovarian    Heart Failure Sister     Lung Disease Sister     Asthma Child     Cancer Maternal Aunt         pancreatic    Cancer Maternal Grandfather         stomach        Current Facility-Administered Medications   Medication Dose Route Frequency    pantoprazole (PROTONIX) tablet 40 mg  40 mg Oral ACB    insulin glargine (LANTUS) injection 45 Units  45 Units SubCUTAneous QHS    methylPREDNISolone (PF) (SOLU-MEDROL) injection 40 mg  40 mg IntraVENous Q12H    piperacillin-tazobactam (ZOSYN) 4.5 g in 0.9% sodium chloride (MBP/ADV) 100 mL MBP  4.5 g IntraVENous Q6H    aspirin delayed-release tablet 81 mg  81 mg Oral DAILY    furosemide (LASIX) tablet 20 mg  20 mg Oral DAILY    hydroCHLOROthiazide (HYDRODIURIL) tablet 12.5 mg  12.5 mg Oral DAILY    lactobacillus sp. 50 billion cpu (BIO-K PLUS) capsule 1 Cap  1 Cap Oral DAILY    lisinopriL (PRINIVIL, ZESTRIL) tablet 20 mg  20 mg Oral BID    montelukast (SINGULAIR) tablet 10 mg  10 mg Oral QHS    sodium chloride (NS) flush 5-40 mL  5-40 mL IntraVENous Q8H    insulin lispro (HUMALOG) injection   SubCUTAneous AC&HS    ascorbic acid (vitamin C) (VITAMIN C) tablet 500 mg  500 mg Oral BID    zinc sulfate (ZINCATE) 220 (50) mg capsule 1 Cap  1 Cap Oral DAILY    cholecalciferol (VITAMIN D3) (1000 Units /25 mcg) tablet 2 Tab  2,000 Units Oral DAILY    enoxaparin (LOVENOX) injection 40 mg  40 mg SubCUTAneous Q12H    guaiFENesin ER (MUCINEX) tablet 1,200 mg  1,200 mg Oral Q12H    albuterol-ipratropium (DUO-NEB) 2.5 MG-0.5 MG/3 ML  3 mL Nebulization Q4H RT    budesonide (PULMICORT) 1,000 mcg/2 mL nebulizer susp  1,000 mcg Nebulization BID RT       Review of Systems:  A comprehensive ROS was obtained as stated in HPI, all others are negative      Objective:   Vital Signs:    Visit Vitals  /50   Pulse (!) 50   Temp 98.4 °F (36.9 °C)   Resp 19   Ht 5' 3\" (1.6 m)   Wt 121.1 kg (267 lb)   SpO2 98%   BMI 47.30 kg/m²       O2 Device: BIPAP   O2 Flow Rate (L/min): 4 l/min   No data recorded. Intake/Output:   Last shift:      No intake/output data recorded. Last 3 shifts: No intake/output data recorded. No intake or output data in the 24 hours ending 07/25/20 1101   Physical Exam:   General:  Alert, cooperative, no respiratory distress sitting upright in stretcher with 1 leg off, wearing nasal cannula   Head:  Normocephalic, without obvious abnormality, atraumatic. Eyes:  Conjunctivae/corneas clear. ANicteric, PERRLA, EOMI   Nose: Nares normal. Mucosa normal. No drainage or sinus tenderness. Throat: Lips, mucosa dry. NO thrush; poor dentition, no oral lesions, Mallamapti IV   Neck: Supple, symmetrical, trachea midline, no adenopathy, thyroid: no enlargment/tenderness/nodules, no carotid bruit and no JVD. No crepitus   Lungs:    Very poor air entry bilaterally, scattered wheezes and rhonchi throughout all lung fields   Heart:  Regular rate and rhythm, S1, S2 normal, no murmur, click, rub or gallop. Abdomen:   Soft, obese, non-tender. Bowel sounds normal. No masses,  No organomegaly. No paradoxical motion   Extremities: normal, atraumatic, no cyanosis or clubbing. Patient has trace edema bilaterally in lower extremities   Pulses: 1-2+ and symmetric all extremities.    Skin: Skin color, texture, turgor normal. No rashes or lesions   Lymph nodes: Cervical, supraclavicular, and axillary nodes normal.   Neurologic: Grossly nonfocal, strength and coordination grossly intact throughout all extremities, sensation also grossly intact          Data review:   Labs:  Recent Results (from the past 24 hour(s))   GLUCOSE, POC    Collection Time: 07/24/20 12:30 PM   Result Value Ref Range    Glucose (POC) 299 (H) 70 - 110 mg/dL   GLUCOSE, POC    Collection Time: 07/24/20  5:10 PM   Result Value Ref Range    Glucose (POC) 303 (H) 70 - 110 mg/dL   GLUCOSE, POC    Collection Time: 07/25/20 12:15 AM   Result Value Ref Range    Glucose (POC) 201 (H) 70 - 110 mg/dL   PROTHROMBIN TIME + INR    Collection Time: 07/25/20  4:41 AM   Result Value Ref Range    Prothrombin time 13.6 11.5 - 15.2 sec    INR 1.1 0.8 - 1.2     PTT    Collection Time: 07/25/20  4:41 AM   Result Value Ref Range    aPTT 27.0 23.0 - 36.4 SEC   FIBRINOGEN    Collection Time: 07/25/20  4:41 AM   Result Value Ref Range    Fibrinogen 481 (H) 210 - 451 mg/dL   LD    Collection Time: 07/25/20  4:41 AM   Result Value Ref Range     81 - 234 U/L   FERRITIN    Collection Time: 07/25/20  4:41 AM   Result Value Ref Range    Ferritin 53 8 - 388 NG/ML   D DIMER    Collection Time: 07/25/20  4:41 AM   Result Value Ref Range    D DIMER <0.27 <0.46 ug/ml(FEU)   C REACTIVE PROTEIN, QT    Collection Time: 07/25/20  4:41 AM   Result Value Ref Range    C-Reactive protein 2.4 (H) 0 - 0.3 mg/dL   METABOLIC PANEL, BASIC    Collection Time: 07/25/20  4:41 AM   Result Value Ref Range    Sodium 136 136 - 145 mmol/L    Potassium 4.1 3.5 - 5.5 mmol/L    Chloride 100 100 - 111 mmol/L    CO2 29 21 - 32 mmol/L    Anion gap 7 3.0 - 18 mmol/L    Glucose 257 (H) 74 - 99 mg/dL    BUN 18 7.0 - 18 MG/DL    Creatinine 1.00 0.6 - 1.3 MG/DL    BUN/Creatinine ratio 18 12 - 20      GFR est AA >60 >60 ml/min/1.73m2    GFR est non-AA 57 (L) >60 ml/min/1.73m2    Calcium 9.5 8.5 - 10.1 MG/DL   MAGNESIUM    Collection Time: 07/25/20  4:41 AM   Result Value Ref Range    Magnesium 1.9 1.6 - 2.6 mg/dL   GLUCOSE, POC    Collection Time: 07/25/20  8:48 AM   Result Value Ref Range    Glucose (POC) 278 (H) 70 - 110 mg/dL     ABG:  No results found for: PH, PHI, PCO2, PCO2I, PO2, PO2I, HCO3, HCO3I, FIO2, FIO2I      PFT Results  (Last 48 hours)    None        Echo Results  (Last 48 hours)    None        Imaging:  I have personally reviewed the patients radiographs and have reviewed the reports:  Chest x-ray from today shows bilateral interstitial infiltrates consistent with multifocal pneumonia from COVID-19 sepsis and possible fluid overload  CXR Results  (Last 48 hours)               07/25/20 0241  XR CHEST PORT Final result    Impression:  IMPRESSION:       Patchy left-sided infiltrates. Decreased penetration through the cardiac silhouette. Top normal cardiac size is   stable mild cardiomegaly. .   Atherosclerosis. Narrative:  EXAM: CHEST ONE VIEW portable 0117 hours       CLINICAL HISTORY/INDICATION: Acute exacerbation of COPD/asthma secondary to   Covid - 19 pneumonia and sepsis, acute on chronic hypoxic and hypercapnic   respiratory failure , recent inpatient treatment for Covid - 19 pneumonia sepsis   with recent discharge       COMPARISON: Chest x-ray July 23, July 22, July 12, 2020. TECHNIQUE: Single view obtained. FINDINGS:        The cardiac silhouette is top normal in size to mildly enlarged. There is   tortuosity of the aorta with calcified plaque. The lungs are adequately   inflated. Small focal groundglass infiltrate at the left perihilar region   redemonstrated. Band of streaky increased opacity at the left inferior lateral   hilar region. Diminished penetration through the cardiac silhouette. Pulmonary   vascularity is normal. Minimal blunting of the right costophrenic angle. No bony   abnormalities are seen. CT Results  (Last 48 hours)    None            High complexity decision making was performed during the evaluation of this patient at high risk for decompensation with multiple organ involvement    Above mentioned total time spent on reviewing the case/medical record/data/notes/EMR/patient examination/documentation/coordinating care with nurse/consultants, exclusive of procedures with complex decision making performed and > 50% time spent in face to face evaluation, and coordination of care         Tyler Grissom.  Jose Menendez MD    Pulmonary, [address] [facility]

## 2020-07-25 NOTE — PROGRESS NOTES
[facility] Medicine  Progress Note    Patient: Jah Bartholomew MRN: [id number]   SSN: [id number]  YOB: 1960   Age: 60 y.o. Sex: female      Admit Date: 7/22/2020    LOS: 3 days   Chief Complaint   Patient presents with    Shortness of Breath       Subjective:     Patient is well today, was seen sleeping and on BiPap when seen this AM. Upon waking, she states she was feeling better, and thinks the breathing treatments are helping her considerably. Patient was a less dyspneic, much better breathing, able to speak full sentences. Review of Systems   Constitutional: Positive for malaise/fatigue. Negative for chills, diaphoresis, fever and weight loss. HENT: Negative for congestion. Respiratory: Positive for cough (improved), sputum production, shortness of breath (improved) and wheezing (improved). Negative for hemoptysis. Cardiovascular: Positive for leg swelling. Negative for chest pain and palpitations. Gastrointestinal: Positive for abdominal pain. Negative for diarrhea, nausea and vomiting. Neurological: Negative for dizziness, seizures, loss of consciousness, weakness and headaches. Objective:     Visit Vitals  /61   Pulse 62   Temp 98.4 °F (36.9 °C)   Resp 16   Ht 5' 3\" (1.6 m)   Wt 121.1 kg (267 lb)   SpO2 99%   BMI 47.30 kg/m²       Physical Exam:   Physical Exam  Constitutional:       General: She is not in acute distress. Appearance: She is obese. She is not ill-appearing, toxic-appearing or diaphoretic. HENT:      Head: Normocephalic and atraumatic. Nose: No congestion. Eyes:      Extraocular Movements: Extraocular movements intact. Pupils: Pupils are equal, round, and reactive to light. Cardiovascular:      Rate and Rhythm: Normal rate and regular rhythm. Pulses: Normal pulses. Heart sounds: No murmur. No friction rub. No gallop. Pulmonary:      Effort: Tachypnea present. No respiratory distress.       Breath sounds: No stridor. Wheezing (reduced from prior) present. No rhonchi or rales. Chest:      Chest wall: No tenderness. Abdominal:      General: Abdomen is flat. Bowel sounds are normal.      Palpations: Abdomen is soft. Neurological:      General: No focal deficit present. Mental Status: She is alert and oriented to person, place, and time. Intake and Output:  Current Shift: No intake/output data recorded. Last three shifts: No intake/output data recorded.     Lab/Data Review:  Recent Results (from the past 12 hour(s))   GLUCOSE, POC    Collection Time: 07/25/20 12:15 AM   Result Value Ref Range    Glucose (POC) 201 (H) 70 - 110 mg/dL   PROTHROMBIN TIME + INR    Collection Time: 07/25/20  4:41 AM   Result Value Ref Range    Prothrombin time 13.6 11.5 - 15.2 sec    INR 1.1 0.8 - 1.2     PTT    Collection Time: 07/25/20  4:41 AM   Result Value Ref Range    aPTT 27.0 23.0 - 36.4 SEC   FIBRINOGEN    Collection Time: 07/25/20  4:41 AM   Result Value Ref Range    Fibrinogen 481 (H) 210 - 451 mg/dL   LD    Collection Time: 07/25/20  4:41 AM   Result Value Ref Range     81 - 234 U/L   FERRITIN    Collection Time: 07/25/20  4:41 AM   Result Value Ref Range    Ferritin 53 8 - 388 NG/ML   D DIMER    Collection Time: 07/25/20  4:41 AM   Result Value Ref Range    D DIMER <0.27 <0.46 ug/ml(FEU)   C REACTIVE PROTEIN, QT    Collection Time: 07/25/20  4:41 AM   Result Value Ref Range    C-Reactive protein 2.4 (H) 0 - 0.3 mg/dL   METABOLIC PANEL, BASIC    Collection Time: 07/25/20  4:41 AM   Result Value Ref Range    Sodium 136 136 - 145 mmol/L    Potassium 4.1 3.5 - 5.5 mmol/L    Chloride 100 100 - 111 mmol/L    CO2 29 21 - 32 mmol/L    Anion gap 7 3.0 - 18 mmol/L    Glucose 257 (H) 74 - 99 mg/dL    BUN 18 7.0 - 18 MG/DL    Creatinine 1.00 0.6 - 1.3 MG/DL    BUN/Creatinine ratio 18 12 - 20      GFR est AA >60 >60 ml/min/1.73m2    GFR est non-AA 57 (L) >60 ml/min/1.73m2    Calcium 9.5 8.5 - 10.1 MG/DL MAGNESIUM    Collection Time: 07/25/20  4:41 AM   Result Value Ref Range    Magnesium 1.9 1.6 - 2.6 mg/dL     CXR: similar to one taken yesterday in ED. Knee XRay: No acute fracture or dislocations. Telemetry Yes   Oxygen 3L, bipapQHS     Assessment and Plan:   60 y.o. female with PMH significant COPD on home O2, IDDM2, HTN, HFpEF, SHERRIE on CPAP, GERD, allergic rhinitis, recent Covid infection/acute on chronic hypoxic respiratory failure, now admitted with worsening dyspnea in the setting of recent covid infection.     Acute on chronic hypoxic respiratory failure likely 2/2 Covid, in the setting of chronic COPD/asthma overlapping syndrome, HFpEF. : DDx can include COPD exacerbation, COVID pneumonia, fluid overload/CHF exacerbation. Pt w/ positive covid swab (7/7). Pt with severe underlying lung disease, Severe COPD on max therapy w/ strong asthma component. Hospitalized 5x in 2020 for COPD exacerbations. Recently DCd for COVID, from the service. Felt better, but has had worsening respiratory for several days now. mild cough as well     - Consult Pulm  · Patient already received course of Remdesevir, no further indication for additional doses  · Repeat COVID-19 testing, sputum cx, serum procalcitonin and other inflammatory markers  · Continue scheduled bronchodilators: duonebs q4h, pulmicort nebs 1mg BID, and albuterol PRN -- discussed with RT and nursing, advised of utmost importance due to ongoing bronchospasm  · Please hold home bronchodilators. May resume on discharge  · switch to PO prednisone 40mg daily starting tomorrow -- will determine length of taper based on response, will eventually taper to chronic dose of 5mg daily. · Maintain net negative fluid balance as renal function tolerates.    · F/U with infusion center for benralizumab therapy as an outpatient once infection has cleared  - DC zosyn  - FU Daily CBC, BMP, COVID labs/coags  - FU ABG now  - Imaging CXR daily  - lovenox COVID VTE prophylaxis (BID 40mg)  - solumedrol 40mg, BID, switch to Prednisone 40mg Qd  - VitC, VitD, zinc  - pilmicort, arnuity ellipta, singulair  - Guaifenesin  - tessalon perls  - claritin if needed    B/l leg swelling, pain, in setting of COVID positive patient.  -negative physical exam for DVT  -FU b/l PVLs  -on lovenox prophylaxis dose, increase if DVT to treatment dose.      Insulin dependent Type 2 Diabete: Home lantus 45u pm and novolog SSI. -stay at Lantus at 45 U.  -SSI   -Accuchecks ACHS  -Diabetic diet  -Diabetic educator consult  -expect further labile BS due to steroid use     Hypertension, HFpEF: ECHO (5/24/20) NB 29 - 70%. No regional wall motion abnormality. Moderate (grade 2) left ventricular diastolic dysfunction. Mildly dilated left atrium. Pulmonary hypertension. Pulmonary arterial systolic pressure is 61 mmHg. Severely elevated central venous pressure (15+ mmHg); IVC diameter is larger than 21 mm and collapses less than 50% with respiration. SBP in ED elevated  to 140s-170s.  Pt on lisinopril 20mg BID and HCTZ 12.5mg daily at home. Patient endorsing increased swelling in Abdomen and legs, feels bloated.      - Continue Lasix 20mg daily  - Continue Lisinopril 20mg BID  - Continue HCTZ 12.5 mg daily     GERD: Pt takes omeprazole 40mg daily and pepcid for breakthrough symptoms at home.   -Continue protonix 40mg daily  -Simethicone PRN     Morbid obesity  -Diabetic diet       Diet DIET DIABETIC CONSISTENT CARB    DVT Prophylaxis Lovenox   GI Prophylaxis Protonix   Code status Full   Disposition >2 nights     Point of Contact Jignarachael Morrowayde  Relationship: Daughter  [phone]     Margeret Lesch, MD, PGY-1   Kelli Chapman   Intern Pager: [phone]   July 25, 2020, 8:04 AM

## 2020-07-26 LAB
GLUCOSE BLD STRIP.AUTO-MCNC: 104 MG/DL (ref 70–110)
GLUCOSE BLD STRIP.AUTO-MCNC: 164 MG/DL (ref 70–110)
GLUCOSE BLD STRIP.AUTO-MCNC: 238 MG/DL (ref 70–110)
GLUCOSE BLD STRIP.AUTO-MCNC: 313 MG/DL (ref 70–110)

## 2020-07-26 PROCEDURE — 74011250637 HC RX REV CODE- 250/637: Performed by: INTERNAL MEDICINE

## 2020-07-26 PROCEDURE — 74011000250 HC RX REV CODE- 250: Performed by: INTERNAL MEDICINE

## 2020-07-26 PROCEDURE — 74011636637 HC RX REV CODE- 636/637: Performed by: STUDENT IN AN ORGANIZED HEALTH CARE EDUCATION/TRAINING PROGRAM

## 2020-07-26 PROCEDURE — 82962 GLUCOSE BLOOD TEST: CPT

## 2020-07-26 PROCEDURE — 77010033678 HC OXYGEN DAILY

## 2020-07-26 PROCEDURE — 65660000000 HC RM CCU STEPDOWN

## 2020-07-26 PROCEDURE — 94660 CPAP INITIATION&MGMT: CPT

## 2020-07-26 PROCEDURE — 94762 N-INVAS EAR/PLS OXIMTRY CONT: CPT

## 2020-07-26 PROCEDURE — 74011000250 HC RX REV CODE- 250: Performed by: STUDENT IN AN ORGANIZED HEALTH CARE EDUCATION/TRAINING PROGRAM

## 2020-07-26 PROCEDURE — 74011250637 HC RX REV CODE- 250/637: Performed by: STUDENT IN AN ORGANIZED HEALTH CARE EDUCATION/TRAINING PROGRAM

## 2020-07-26 PROCEDURE — 74011250636 HC RX REV CODE- 250/636: Performed by: STUDENT IN AN ORGANIZED HEALTH CARE EDUCATION/TRAINING PROGRAM

## 2020-07-26 RX ADMIN — PREDNISONE 40 MG: 20 TABLET ORAL at 08:09

## 2020-07-26 RX ADMIN — GUAIFENESIN 1200 MG: 600 TABLET, EXTENDED RELEASE ORAL at 08:10

## 2020-07-26 RX ADMIN — INSULIN LISPRO 12 UNITS: 100 INJECTION, SOLUTION INTRAVENOUS; SUBCUTANEOUS at 18:36

## 2020-07-26 RX ADMIN — LISINOPRIL 20 MG: 20 TABLET ORAL at 20:21

## 2020-07-26 RX ADMIN — ZINC SULFATE 220 MG (50 MG) CAPSULE 1 CAPSULE: CAPSULE at 08:10

## 2020-07-26 RX ADMIN — IPRATROPIUM BROMIDE AND ALBUTEROL SULFATE 3 ML: .5; 3 SOLUTION RESPIRATORY (INHALATION) at 20:23

## 2020-07-26 RX ADMIN — HYDROCHLOROTHIAZIDE 12.5 MG: 25 TABLET ORAL at 08:11

## 2020-07-26 RX ADMIN — Medication 1 CAPSULE: at 08:10

## 2020-07-26 RX ADMIN — Medication 500 MG: at 08:09

## 2020-07-26 RX ADMIN — GUAIFENESIN 1200 MG: 600 TABLET, EXTENDED RELEASE ORAL at 20:22

## 2020-07-26 RX ADMIN — MONTELUKAST 10 MG: 10 TABLET, FILM COATED ORAL at 20:21

## 2020-07-26 RX ADMIN — FUROSEMIDE 20 MG: 20 TABLET ORAL at 08:10

## 2020-07-26 RX ADMIN — ASPIRIN 81 MG: 81 TABLET, COATED ORAL at 08:09

## 2020-07-26 RX ADMIN — ENOXAPARIN SODIUM 40 MG: 40 INJECTION SUBCUTANEOUS at 20:23

## 2020-07-26 RX ADMIN — Medication 500 MG: at 20:21

## 2020-07-26 RX ADMIN — Medication 5 ML: at 14:00

## 2020-07-26 RX ADMIN — IPRATROPIUM BROMIDE AND ALBUTEROL SULFATE 3 ML: .5; 3 SOLUTION RESPIRATORY (INHALATION) at 17:26

## 2020-07-26 RX ADMIN — ENOXAPARIN SODIUM 40 MG: 40 INJECTION SUBCUTANEOUS at 08:09

## 2020-07-26 RX ADMIN — AMOXICILLIN AND CLAVULANATE POTASSIUM 1 TABLET: 875; 125 TABLET, FILM COATED ORAL at 20:21

## 2020-07-26 RX ADMIN — BUDESONIDE 1000 MCG: 1 SUSPENSION RESPIRATORY (INHALATION) at 13:50

## 2020-07-26 RX ADMIN — PANTOPRAZOLE 40 MG: 40 TABLET, DELAYED RELEASE ORAL at 08:10

## 2020-07-26 RX ADMIN — IPRATROPIUM BROMIDE AND ALBUTEROL SULFATE 3 ML: .5; 3 SOLUTION RESPIRATORY (INHALATION) at 13:50

## 2020-07-26 RX ADMIN — Medication 10 ML: at 22:25

## 2020-07-26 RX ADMIN — IPRATROPIUM BROMIDE AND ALBUTEROL SULFATE 3 ML: .5; 3 SOLUTION RESPIRATORY (INHALATION) at 08:10

## 2020-07-26 RX ADMIN — CHOLECALCIFEROL TAB 25 MCG (1000 UNIT) 2 TABLET: 25 TAB at 08:09

## 2020-07-26 RX ADMIN — INSULIN LISPRO 6 UNITS: 100 INJECTION, SOLUTION INTRAVENOUS; SUBCUTANEOUS at 13:44

## 2020-07-26 RX ADMIN — INSULIN GLARGINE 50 UNITS: 100 INJECTION, SOLUTION SUBCUTANEOUS at 22:33

## 2020-07-26 RX ADMIN — INSULIN LISPRO 3 UNITS: 100 INJECTION, SOLUTION INTRAVENOUS; SUBCUTANEOUS at 22:34

## 2020-07-26 RX ADMIN — LISINOPRIL 20 MG: 20 TABLET ORAL at 08:10

## 2020-07-26 RX ADMIN — AMOXICILLIN AND CLAVULANATE POTASSIUM 1 TABLET: 875; 125 TABLET, FILM COATED ORAL at 08:09

## 2020-07-26 NOTE — PROGRESS NOTES
[facility]  Progress Note    Patient: Zaida Morgan MRN: [id number]   SSN: [id number]  YOB: 1960   Age: 60 y.o. Sex: female      Admit Date: 7/22/2020    LOS: 5 days   Chief Complaint   Patient presents with    Shortness of Breath       Subjective:     No events overnight. Resting comfortably on bipap this morning. Groggy when awoken but feels improved. Review of Systems   Constitutional: Negative for chills, diaphoresis and fever. HENT: Negative for congestion and sore throat. Respiratory: Positive for cough, sputum production, shortness of breath and wheezing. Negative for hemoptysis. Cardiovascular: Positive for leg swelling. Negative for chest pain and palpitations. Gastrointestinal: Negative for diarrhea, nausea and vomiting. Neurological: Negative for dizziness, seizures, loss of consciousness, weakness and headaches. Objective:     Visit Vitals  /64   Pulse 77   Temp 97.9 °F (36.6 °C)   Resp 23   Ht 5' 3\" (1.6 m)   Wt 121.1 kg (267 lb)   SpO2 95%   BMI 47.30 kg/m²       Physical Exam:   Physical Exam  Constitutional:       General: She is not in acute distress. Appearance: She is obese. She is not ill-appearing, toxic-appearing or diaphoretic. HENT:      Head: Normocephalic and atraumatic. Nose: No congestion. Eyes:      Extraocular Movements: Extraocular movements intact. Pupils: Pupils are equal, round, and reactive to light. Cardiovascular:      Rate and Rhythm: Normal rate and regular rhythm. Pulses: Normal pulses. Heart sounds: No murmur. No gallop. Pulmonary:      Effort: Pulmonary effort is normal. Tachypnea present. No respiratory distress. Breath sounds: No stridor. Wheezing (reduced from prior) present. No rhonchi or rales. Chest:      Chest wall: No tenderness. Abdominal:      General: Abdomen is flat. Bowel sounds are normal.      Palpations: Abdomen is soft.    Neurological: Mental Status: She is alert. Mental status is at baseline. Intake and Output:  Current Shift: No intake/output data recorded. Last three shifts: 07/25 0701 - 07/26 1900  In: 100 [I.V.:100]  Out: -     Lab/Data Review:  Recent Results (from the past 12 hour(s))   GLUCOSE, POC    Collection Time: 07/26/20 10:25 PM   Result Value Ref Range    Glucose (POC) 164 (H) 70 - 110 mg/dL   GLUCOSE, POC    Collection Time: 07/27/20  6:39 AM   Result Value Ref Range    Glucose (POC) 117 (H) 70 - 110 mg/dL     Telemetry Yes   Oxygen 3L, bipapQHS     Assessment and Plan:   60 y.o. female with PMH significant COPD on home O2, IDDM2, HTN, HFpEF, SHERRIE on CPAP, GERD, allergic rhinitis, recent Covid infection/acute on chronic hypoxic respiratory failure, now admitted with worsening dyspnea in the setting of recent covid infection.     Acute on chronic hypoxic respiratory failure likely 2/2 Covid, in the setting of chronic COPD/asthma overlapping syndrome, HFpEF. Improving greatly. Work of breathing decreasing and patient beginning to feel improved. Likely to be discharged today.   -Pulm consulted; appreciate recs   - Abx, zosyn  - FU Daily CBC, BMP, COVID labs/coags  - lovenox COVID VTE prophylaxis (BID 40mg)  - Continue PO Prednisone (started 7/26)   - VitC, VitD, zinc  - Continue Pulmicort, Singulair   - Continue Guaifenesin  - tessalon pearls  - claritin if needed   - Likely DC 7/27    B/l leg swelling, pain, in setting of COVID positive patient.  -negative physical exam for DVT  -b/l PVLs, nothing acute  -on lovenox prophylaxis dose, increase if DVT to treatment dose.      Insulin dependent Type 2 Diabete: Home lantus 45u pm and novolog SSI. -Currently on Lantus at 50 U, may need more with steroids  -SSI   -Accuchecks ACHS  -Diabetic diet  -Diabetic educator consult  -expect further labile BS due to steroid use     Hypertension, HFpEF: ECHO (5/24/20) SH 56 - 70%.  No regional wall motion abnormality. Moderate (grade 2) left ventricular diastolic dysfunction. Mildly dilated left atrium. Pulmonary hypertension. Pulmonary arterial systolic pressure is 61 mmHg. Severely elevated central venous pressure (15+ mmHg); IVC diameter is larger than 21 mm and collapses less than 50% with respiration. SBP in ED elevated  to 140s-170s. Pt on lisinopril 20mg BID and HCTZ 12.5mg daily at home. Patient endorsing increased swelling in Abdomen and legs, feels bloated.      - Continue Lasix 20mg daily  - Continue Lisinopril 20mg BID  - Continue HCTZ 12.5 mg daily     GERD: Pt takes omeprazole 40mg daily and pepcid for breakthrough symptoms at home.   -Continue protonix 40mg daily  -Simethicone PRN     Morbid obesity  -Diabetic diet     Diet DIET DIABETIC CONSISTENT CARB    DVT Prophylaxis Lovenox   GI Prophylaxis Protonix   Code status Full   Disposition >2 nights     Point of Contact Greig Bernheim  Relationship: Daughter  ([phone], DO, PGY-3   [address]   Intern Pager: [phone]   July 27, 2020, Mily

## 2020-07-26 NOTE — PROGRESS NOTES
[facility] Medicine  Progress Note    Patient: Leesa Garsia MRN: [id number]   SSN: [id number]  YOB: 1960   Age: 60 y.o. Sex: female      Admit Date: 7/22/2020    LOS: 4 days   Chief Complaint   Patient presents with    Shortness of Breath       Subjective:     Patient is well today, was seen sleeping and on BiPap when seen this AM. Upon waking, she states she was feeling better, and thinks the breathing treatments are helping her considerably. Speaking in full sentences and says as long as she does well today she feels like she could probably go home tomorrow on PO steroids. Review of Systems   Constitutional: Negative for chills, diaphoresis and fever. HENT: Negative for congestion and sore throat. Respiratory: Positive for cough, sputum production, shortness of breath and wheezing. Negative for hemoptysis. Cardiovascular: Positive for leg swelling. Negative for chest pain and palpitations. Gastrointestinal: Negative for diarrhea, nausea and vomiting. Neurological: Negative for dizziness, seizures, loss of consciousness, weakness and headaches. Objective:     Visit Vitals  /62   Pulse (!) 58   Temp 97.9 °F (36.6 °C)   Resp 16   Ht 5' 3\" (1.6 m)   Wt 121.1 kg (267 lb)   SpO2 97%   BMI 47.30 kg/m²       Physical Exam:   Physical Exam  Constitutional:       General: She is not in acute distress. Appearance: She is obese. She is not ill-appearing, toxic-appearing or diaphoretic. HENT:      Head: Normocephalic and atraumatic. Nose: No congestion. Eyes:      Extraocular Movements: Extraocular movements intact. Pupils: Pupils are equal, round, and reactive to light. Cardiovascular:      Rate and Rhythm: Normal rate and regular rhythm. Pulses: Normal pulses. Heart sounds: No murmur. No gallop. Pulmonary:      Effort: Pulmonary effort is normal. Tachypnea present. No respiratory distress. Breath sounds: No stridor. Wheezing (reduced from prior) present. No rhonchi or rales. Chest:      Chest wall: No tenderness. Abdominal:      General: Abdomen is flat. Bowel sounds are normal.      Palpations: Abdomen is soft. Neurological:      General: No focal deficit present. Mental Status: She is alert and oriented to person, place, and time. Intake and Output:  Current Shift: No intake/output data recorded. Last three shifts: 07/24 1901 - 07/26 0700  In: 100 [I.V.:100]  Out: -     Lab/Data Review:  Recent Results (from the past 12 hour(s))   GLUCOSE, POC    Collection Time: 07/25/20  9:34 PM   Result Value Ref Range    Glucose (POC) 267 (H) 70 - 110 mg/dL     CXR: similar to one taken yesterday in ED. Knee XRay: No acute fracture or dislocations. Telemetry Yes   Oxygen 3L, bipapQHS     Assessment and Plan:   60 y.o. female with PMH significant COPD on home O2, IDDM2, HTN, HFpEF, SHERRIE on CPAP, GERD, allergic rhinitis, recent Covid infection/acute on chronic hypoxic respiratory failure, now admitted with worsening dyspnea in the setting of recent covid infection.     Acute on chronic hypoxic respiratory failure likely 2/2 Covid, in the setting of chronic COPD/asthma overlapping syndrome, HFpEF. : DDx can include COPD exacerbation, COVID pneumonia, fluid overload/CHF exacerbation. Pt w/ positive covid swab (7/7 and 7/22). Pt with severe underlying lung disease, Severe COPD on max therapy w/ strong asthma component. Hospitalized 5x in 2020 for COPD exacerbations. Recently DCd for COVID, from the service. Felt better, but has had worsening respiratory for several days prior to admission.  mild cough as well  - Consult Pulm  · Patient already received course of Remdesevir, no further indication for additional doses  · Repeat COVID-19 testing, sputum cx, serum procalcitonin and other inflammatory markers  · Continue scheduled bronchodilators: duonebs q4h, pulmicort nebs 1mg BID, and albuterol PRN -- discussed with RT and nursing, advised of utmost importance due to ongoing bronchospasm  · Please hold home bronchodilators. May resume on discharge  · Maintain net negative fluid balance as renal function tolerates. - Abx, zosyn  - FU Daily CBC, BMP, COVID labs/coags  - Imaging CXR daily  - lovenox COVID VTE prophylaxis (BID 40mg)  - solumedrol 60mg, weaned down to q24h then to PO pred 7/26  - VitC, VitD, zinc  - pilmicort, arnuity ellipta, singulair  - Guaifenesin  - tessalon perls  - claritin if needed  - Likely DC 7/27    B/l leg swelling, pain, in setting of COVID positive patient.  -negative physical exam for DVT  -b/l PVLs, nothing acute  -on lovenox prophylaxis dose, increase if DVT to treatment dose.      Insulin dependent Type 2 Diabete: Home lantus 45u pm and novolog SSI. -increased Lantus at 50 U, may need more with steroids  -SSI   -Accuchecks ACHS  -Diabetic diet  -Diabetic educator consult  -expect further labile BS due to steroid use     Hypertension, HFpEF: ECHO (5/24/20) ZY 17 - 70%. No regional wall motion abnormality. Moderate (grade 2) left ventricular diastolic dysfunction. Mildly dilated left atrium. Pulmonary hypertension. Pulmonary arterial systolic pressure is 61 mmHg. Severely elevated central venous pressure (15+ mmHg); IVC diameter is larger than 21 mm and collapses less than 50% with respiration. SBP in ED elevated  to 140s-170s.  Pt on lisinopril 20mg BID and HCTZ 12.5mg daily at home. Patient endorsing increased swelling in Abdomen and legs, feels bloated.      - Continue Lasix 20mg daily  - Continue Lisinopril 20mg BID  - Continue HCTZ 12.5 mg daily     GERD: Pt takes omeprazole 40mg daily and pepcid for breakthrough symptoms at home.   -Continue protonix 40mg daily  -Simethicone PRN     Morbid obesity  -Diabetic diet     Diet DIET DIABETIC CONSISTENT CARB    DVT Prophylaxis Lovenox   GI Prophylaxis Protonix   Code status Full   Disposition >2 nights     Point of Contact Aston Nova Reji Dahl  Relationship: Daughter  [phone]     Connor Oglesby MD, PGY-2   [address]   Intern Pager: [phone]   July 26, 2020, 8:39 AM

## 2020-07-26 NOTE — ED NOTES
Patient is sitting up on side of bed reading on her phone. Patient requested a cup of ice. Showing no signs of distress. Call bell within reach.

## 2020-07-26 NOTE — ED NOTES
Received report from [place], [facility].

## 2020-07-26 NOTE — ED NOTES
Patient in room sitting on side of bed. Attached new electrodes, emptied bedside commode. Call light is within reach.

## 2020-07-26 NOTE — PROGRESS NOTES
[employer] [facility]. Pulmonary, Critical Care, and Sleep Medicine    F/U Patient Consult    Name: Carrol Last MRN: [id number]   : 1960 Hospital: [facility]   Date: 2020        This patient has been seen and evaluated at the request of Dr. Asiya Glass for severe dyspnea. IMPRESSION:   · Acute exacerbation of COPD/asthma overlap syndrome -- Secondary to COVID-19 pneumonia/sepsis  · Acute on chronic hypoxic and hypercapnic respiratory failure  · Underlying poorly controlled severe persistent asthma with steroid dependence  · COPD, gold risk category D  · COVID-19 pneumonia/severe sepsis:  Pt has severe disease given hospitalization, discharge and worsening of sx  · Worsening dyspnea/SOB:  Etiology multifactorial, due to above  · Morbid obesity:Body mass index is 47.3 kg/m².   · Obesity hypoventilation syndrome with SHERRIE: Patient on trilogy in the [address] [place] [address] setting at home  · Chronic steroid dependence  · Previous smoking hx:  Quit 14 years ago  · CHFpEF  · Pulm HTN:  WHO groups 2 and 3 disease     Patient Active Problem List   Diagnosis Code    Essential hypertension, benign I10    Diabetes mellitus, type 2 ([facility] [address] E11.9    Esophageal reflux K21.9    SHERRIE on CPAP G47.33, Z99.89    [facility] ([facility] obstructive pulmonary disease) [facility] J44.9    Allergic rhinitis J30.9    COPD with acute exacerbation ([facility]) J44.1    Obesity, Class III, BMI 40-49.9 (morbid obesity) ([facility]) E66.01    Chest pain R07.9    Dyspnea R06.00    COPD exacerbation ([facility]) J44.1    Acute exacerbation of COPD with asthma ([facility]) J44.1, F04.066    Diastolic CHF, chronic ([facility]) I50.32    Diverticulosis K57.90    COPD with acute bronchitis ([facility]) J44.0, J20.9    Hyperlipidemia E78.5    Type 2 diabetes with nephropathy ([facility]) E11.21    Bilateral carotid bruits R09.89    Palpitations R00.2    Acute exacerbation of chronic obstructive pulmonary disease (COPD) ([facility]) J44.1    Atelectasis of right lung J98.11    Hypoxia R09.02    COVID-19 U07.1    Hypokalemia E87.6    COVID-19 virus infection U07.1    Acute respiratory disease due to COVID-19 virus U07.1, J06.9      RECOMMENDATIONS:   Patient already received course of Remdesevir, no further indication for additional doses  F/U repeat COVID-19 testing, sputum cx, and other inflammatory markers  Continue scheduled bronchodilators: duonebs q4h, pulmicort nebs 1mg BID, and albuterol PRN  Please hold home bronchodilators. May resume on discharge - Advair, Flovent and Spiriva  Continue prednisone  Maintain net negative fluid balance as renal function tolerates. Diuresis per primary service  Antibiotics per primary service. With low procalcitonin and no leukocytosis, advise weaning antibiotics rapidly  Supplemental oxygen to maintain SpO2 >88%  Bipap-ST QHS and PRN -- can do AVAPS-ST mode if possible  Please assess for home oxygen need prior to discharge  Aggressive pulmonary toileting/bronchial hygiene  Frequent incentive spirometry  Aspiration precautions including elevating HOB >30deg  PT/OT, OOB, ambulate with assistance as tolerated  DVT ppx per primary service -- advise high intensity ppx with lovenox SQ 40mg BID  GI ppx due to high dose steroids -- continue as outpatient until weaned off of steroids  F/U with infusion center for benralizumab therapy as an outpatient once infection has cleared  Discussed with primary service  Will follow     Subjective:   07/26/20  Patient seen and examined at bedside this morning. No acute events overnight. Reports that she is receiving breathing treatments and steroids and dyspnea improving. Patient reports no worsening of sputum, no hemoptysis.   Denies any nausea, vomiting, diarrhea      HPI:   Patient is a 60 y.o. female with a past medical history of/overlap syndrome, chronic steroid dependence, obesity hypoventilation syndrome on home trilogy machine, morbid obesity, CHFpEF, chronic hypoxic respiratory failure presented to [facility] with complaints of worsening shortness of breath and dyspnea. Patient was recently discharged from [facility] last week for hospitalization for COVID-19 pneumonia/sepsis. During that hospitalization, patient received Remdesevir, IV steroids, convalescent plasma. Patient was discharged on a prednisone taper, notes that she was using nebulizer twice on the weekend, but then did not use it for the next few days, and noted worsening shortness of breath and dyspnea. Patient reports that although she was using her home inhalers and was taking prednisone, she reports that she continued to worsen. Dyspnea was present at rest, patient found it very difficult to breathe, so she presented back to the ER. Patient was admitted by the family medicine service who consulted me. Of note, patient did a telehealth visit with her PCP earlier today, and was advised to go to the ER. Patient was reporting shortness of breath with intermittent cough, fatigue, lower extremity pain, with no alleviating factors. Patient reports feeling very poorly, reports that very difficult for her to walk and breathe. Bilateral lower extremity duplex was done in the evening, prelim report was negative.   Patient denies any hemoptysis, chest pain, vomiting, diarrhea, worsening LE swelling    Past Medical History:   Diagnosis Date    Asthma     Chronic lung disease     COPD     Cystocele, midline     Diabetes mellitus ([address]     GERD (gastroesophageal reflux disease)     Hidradenitis suppurativa     Hyperlipidemia     Hypertension     SHERRIE on CPAP     CPAP    Stress incontinence       Past Surgical History:   Procedure Laterality Date    BREAST SURGERY PROCEDURE UNLISTED      Right breast benign tumor removal    HX APPENDECTOMY      HX CHOLECYSTECTOMY      HX DILATION AND CURETTAGE      Dysfunctional uterine bleeding, thought 2/2 fibroids    HX TUBAL LIGATION        Prior to Admission medications    Medication Sig Start Date End Date Taking? Authorizing Provider   predniSONE (DELTASONE) 20 mg tablet Take 40 mg by mouth daily (with breakfast) for 7 days, THEN 20 mg daily (with breakfast) for 7 days. 7/19/20 8/2/20 Yes El Mosqueda MD   fluticasone propionate (Flovent HFA) 220 mcg/actuation inhaler Take 1 Puff by inhalation two (2) times a day. 7/7/20  Yes Evonne Kate PA-C   albuterol-ipratropium (DUO-NEB) 2.5 mg-0.5 mg/3 ml nebu 3 mL by Nebulization route every four (4) hours as needed for Wheezing. 7/7/20  Yes Evonne Kate PA-C   albuterol (PROVENTIL HFA, VENTOLIN HFA, PROAIR HFA) 90 mcg/actuation inhaler Take 2 Puffs by inhalation every four (4) hours as needed for Wheezing. 7/7/20  Yes Evonne Kate PA-C   azithromycin [facility]) 250 mg tablet Take 1 Tab by mouth every Monday, Wednesday, Friday. Monday-Friday. 7/3/20  Yes Alberto Rae MD   omeprazole (PRILOSEC) 40 mg capsule Take  by mouth. 3/9/18  Yes Provider, Historical   insulin glargine (Basaglar KwikPen U-100 Insulin) 100 unit/mL (3 mL) inpn 45 Units by SubCUTAneous route nightly. Yes Provider, Historical   furosemide (Lasix) 20 mg tablet Take 20 mg by mouth daily. Yes Provider, Historical   lactobacillus sp. 50 billion cpu (BIO-K PLUS) 50 billion cell -375 mg cap capsule Take 1 Cap by mouth daily. 3/10/20  Yes Augustine Jo MD   simethicone (GAS-X) 125 mg capsule Take 125 mg by mouth four (4) times daily as needed for Flatulence. Yes Provider, Historical   loratadine (CLARITIN) 10 mg tablet Take 10 mg by mouth daily as needed for Allergies. Yes Provider, Historical   lisinopril (PRINIVIL, ZESTRIL) 20 mg tablet Take 20 mg by mouth two (2) times a day. Yes Provider, Historical   fluticasone propion-salmeterol (ADVAIR HFA) 230-21 mcg/actuation inhaler Take 2 Puffs by inhalation two (2) times a day. Yes Provider, Historical   hydroCHLOROthiazide (HYDRODIURIL) 12.5 mg tablet Take 12.5 mg by mouth daily. Yes Provider, Historical   tiotropium bromide (SPIRIVA RESPIMAT) 2.5 mcg/actuation inhaler Take 2 Puffs by inhalation daily. Yes Provider, Historical   NOVOLOG FLEXPEN U-100 INSULIN 100 unit/mL inpn Continue home Sliding scale insulin as prior to admission  Patient taking differently: 1 Units by SubCUTAneous route three (3) times daily. If BG <100=0u  101-150=5u  151-250=8u  251-300=12u  >300 call MD   7/10/18  Yes Chaparrita Ball MD   OXYGEN-AIR DELIVERY SYSTEMS 2 L by Nasal route continuous. [facility] [employer]   Yes Provider, Historical   aspirin delayed-release 81 mg tablet Take 81 mg by mouth daily. Yes Provider, Historical   montelukast (SINGULAIR) 10 mg tablet Take 10 mg by mouth nightly. Yes Other, MD Lauren     Allergies   Allergen Reactions    Ancef [Cefazolin] Hives    Contrast Agent [Iodine] Anaphylaxis, Shortness of Breath and Swelling     Needs pre-medication for IV contrast with Benadryl, Solu-Medrol    Fish Containing Products Anaphylaxis     Pt states she had a severe allergic reaction at 7 y/o.  Statins-Hmg-Coa Reductase Inhibitors Myalgia    Metformin Other (comments)     Abdominal pain, diarrhea.     Codeine Other (comments)     Altered mental status      Social History     Tobacco Use    Smoking status: Former Smoker     Packs/day: 1.00     Years: 30.00     Pack years: 30.00     Types: Cigarettes     Start date: 1966     Last attempt to quit: 2006     Years since quittin.8    Smokeless tobacco: Former User    Tobacco comment: 1-1.5 packs per day   Substance Use Topics    Alcohol use: No      Family History   Problem Relation Age of Onset    Hypertension Mother     Stroke Mother     Breast Cancer Mother         Bilateral mastectomies    Cancer Mother         ovarian and breast    Heart Failure Mother     Heart Attack Father         2011    Heart Surgery Father         CABG    Heart Failure Father     COPD Sister         Heavy smoker    Cancer Sister ovarian    Heart Failure Sister     Lung Disease Sister     Asthma Child     Cancer Maternal Aunt         pancreatic    Cancer Maternal Grandfather         stomach        Current Facility-Administered Medications   Medication Dose Route Frequency    predniSONE (DELTASONE) tablet 40 mg  40 mg Oral DAILY WITH BREAKFAST    amoxicillin-clavulanate (AUGMENTIN) 875-125 mg per tablet 1 Tab  1 Tab Oral Q12H    insulin glargine (LANTUS) injection 50 Units  50 Units SubCUTAneous QHS    pantoprazole (PROTONIX) tablet 40 mg  40 mg Oral ACB    aspirin delayed-release tablet 81 mg  81 mg Oral DAILY    furosemide (LASIX) tablet 20 mg  20 mg Oral DAILY    hydroCHLOROthiazide (HYDRODIURIL) tablet 12.5 mg  12.5 mg Oral DAILY    lactobacillus sp. 50 billion cpu (BIO-K PLUS) capsule 1 Cap  1 Cap Oral DAILY    lisinopriL (PRINIVIL, ZESTRIL) tablet 20 mg  20 mg Oral BID    montelukast (SINGULAIR) tablet 10 mg  10 mg Oral QHS    sodium chloride (NS) flush 5-40 mL  5-40 mL IntraVENous Q8H    insulin lispro (HUMALOG) injection   SubCUTAneous AC&HS    ascorbic acid (vitamin C) (VITAMIN C) tablet 500 mg  500 mg Oral BID    zinc sulfate (ZINCATE) 220 (50) mg capsule 1 Cap  1 Cap Oral DAILY    cholecalciferol (VITAMIN D3) (1000 Units /25 mcg) tablet 2 Tab  2,000 Units Oral DAILY    enoxaparin (LOVENOX) injection 40 mg  40 mg SubCUTAneous Q12H    guaiFENesin ER (MUCINEX) tablet 1,200 mg  1,200 mg Oral Q12H    albuterol-ipratropium (DUO-NEB) 2.5 MG-0.5 MG/3 ML  3 mL Nebulization Q4H RT    budesonide (PULMICORT) 1,000 mcg/2 mL nebulizer susp  1,000 mcg Nebulization BID RT       Review of Systems:  A comprehensive ROS was obtained as stated in HPI, all others are negative      Objective:   Vital Signs:    Visit Vitals  /67   Pulse (!) 52   Temp 97.9 °F (36.6 °C)   Resp 16   Ht 5' 3\" (1.6 m)   Wt 121.1 kg (267 lb)   SpO2 96%   BMI 47.30 kg/m²       O2 Device: BIPAP   O2 Flow Rate (L/min): 4 l/min   Temp (24hrs), Av.9 °F (36.6 °C), Min:97.9 °F (36.6 °C), Max:97.9 °F (36.6 °C)       Intake/Output:   Last shift:      No intake/output data recorded. Last 3 shifts: 1901 -  07  In: 100 [I.V.:100]  Out: -   No intake or output data in the 24 hours ending 20 1100   Physical Exam:   General:  Alert, cooperative, no respiratory distress sitting upright in stretcher with 1 leg off, wearing nasal cannula   Head:  Normocephalic, without obvious abnormality, atraumatic. Eyes:  Conjunctivae/corneas clear. ANicteric, PERRLA, EOMI   Nose: Nares normal. Mucosa normal. No drainage or sinus tenderness. Throat: Lips, mucosa dry. NO thrush; poor dentition, no oral lesions, Mallamapti IV   Neck: Supple, symmetrical, trachea midline, no adenopathy, thyroid: no enlargment/tenderness/nodules, no carotid bruit and no JVD. No crepitus   Lungs:    Very poor air entry bilaterally, scattered wheezes and rhonchi throughout all lung fields   Heart:  Regular rate and rhythm, S1, S2 normal, no murmur, click, rub or gallop. Abdomen:   Soft, obese, non-tender. Bowel sounds normal. No masses,  No organomegaly. No paradoxical motion   Extremities: normal, atraumatic, no cyanosis or clubbing. Patient has trace edema bilaterally in lower extremities   Pulses: 1-2+ and symmetric all extremities.    Skin: Skin color, texture, turgor normal. No rashes or lesions   Lymph nodes: Cervical, supraclavicular, and axillary nodes normal.   Neurologic: Grossly nonfocal, strength and coordination grossly intact throughout all extremities, sensation also grossly intact          Data review:   Labs:  Recent Results (from the past 24 hour(s))   GLUCOSE, POC    Collection Time: 20 12:50 PM   Result Value Ref Range    Glucose (POC) 287 (H) 70 - 110 mg/dL   GLUCOSE, POC    Collection Time: 20  5:04 PM   Result Value Ref Range    Glucose (POC) 354 (H) 70 - 110 mg/dL   GLUCOSE, POC    Collection Time: 20  9:34 PM   Result Value Ref Range Glucose (POC) 267 (H) 70 - 110 mg/dL   GLUCOSE, POC    Collection Time: 07/26/20  7:59 AM   Result Value Ref Range    Glucose (POC) 104 70 - 110 mg/dL     ABG:  No results found for: PH, PHI, PCO2, PCO2I, PO2, PO2I, HCO3, HCO3I, FIO2, FIO2I      PFT Results  (Last 48 hours)    None        Echo Results  (Last 48 hours)    None        Imaging:  I have personally reviewed the patients radiographs and have reviewed the reports:  Chest x-ray from today shows bilateral interstitial infiltrates consistent with multifocal pneumonia from COVID-19 sepsis and possible fluid overload  CXR Results  (Last 48 hours)               07/25/20 0241  XR CHEST PORT Final result    Impression:  IMPRESSION:       Patchy left-sided infiltrates. Decreased penetration through the cardiac silhouette. Top normal cardiac size is   stable mild cardiomegaly. .   Atherosclerosis. Narrative:  EXAM: CHEST ONE VIEW portable 0117 hours       CLINICAL HISTORY/INDICATION: Acute exacerbation of COPD/asthma secondary to   Covid - 19 pneumonia and sepsis, acute on chronic hypoxic and hypercapnic   respiratory failure , recent inpatient treatment for Covid - 19 pneumonia sepsis   with recent discharge       COMPARISON: Chest x-ray July 23, July 22, July 12, 2020. TECHNIQUE: Single view obtained. FINDINGS:        The cardiac silhouette is top normal in size to mildly enlarged. There is   tortuosity of the aorta with calcified plaque. The lungs are adequately   inflated. Small focal groundglass infiltrate at the left perihilar region   redemonstrated. Band of streaky increased opacity at the left inferior lateral   hilar region. Diminished penetration through the cardiac silhouette. Pulmonary   vascularity is normal. Minimal blunting of the right costophrenic angle. No bony   abnormalities are seen.                CT Results  (Last 48 hours)    None            High complexity decision making was performed during the evaluation of this patient at high risk for decompensation with multiple organ involvement    Above mentioned total time spent on reviewing the case/medical record/data/notes/EMR/patient examination/documentation/coordinating care with nurse/consultants, exclusive of procedures with complex decision making performed and > 50% time spent in face to face evaluation, and coordination of care         José Back.  Maribeth Cruz MD    Pulmonary, [address] [place] [address] [facility]

## 2020-07-27 ENCOUNTER — HOME HEALTH ADMISSION (OUTPATIENT)
Dept: HOME HEALTH SERVICES | Facility: HOME HEALTH | Age: 60
End: 2020-07-27
Payer: MEDICARE

## 2020-07-27 ENCOUNTER — APPOINTMENT (OUTPATIENT)
Dept: GENERAL RADIOLOGY | Age: 60
DRG: 177 | End: 2020-07-27
Attending: STUDENT IN AN ORGANIZED HEALTH CARE EDUCATION/TRAINING PROGRAM
Payer: MEDICARE

## 2020-07-27 VITALS
DIASTOLIC BLOOD PRESSURE: 54 MMHG | OXYGEN SATURATION: 93 % | WEIGHT: 267 LBS | SYSTOLIC BLOOD PRESSURE: 104 MMHG | HEIGHT: 63 IN | RESPIRATION RATE: 15 BRPM | TEMPERATURE: 97.9 F | BODY MASS INDEX: 47.31 KG/M2 | HEART RATE: 70 BPM

## 2020-07-27 LAB
ANION GAP SERPL CALC-SCNC: 5 MMOL/L (ref 3–18)
APTT PPP: 24.6 SEC (ref 23–36.4)
BASOPHILS # BLD: 0 K/UL (ref 0–0.1)
BASOPHILS NFR BLD: 0 % (ref 0–2)
BUN SERPL-MCNC: 20 MG/DL (ref 7–18)
BUN/CREAT SERPL: 27 (ref 12–20)
CALCIUM SERPL-MCNC: 9.2 MG/DL (ref 8.5–10.1)
CHLORIDE SERPL-SCNC: 102 MMOL/L (ref 100–111)
CO2 SERPL-SCNC: 33 MMOL/L (ref 21–32)
CREAT SERPL-MCNC: 0.75 MG/DL (ref 0.6–1.3)
CRP SERPL-MCNC: 0.8 MG/DL (ref 0–0.3)
D DIMER PPP FEU-MCNC: <0.27 UG/ML(FEU)
DIFFERENTIAL METHOD BLD: ABNORMAL
EOSINOPHIL # BLD: 0 K/UL (ref 0–0.4)
EOSINOPHIL NFR BLD: 0 % (ref 0–5)
ERYTHROCYTE [DISTWIDTH] IN BLOOD BY AUTOMATED COUNT: 14.1 % (ref 11.6–14.5)
FERRITIN SERPL-MCNC: 36 NG/ML (ref 8–388)
FIBRINOGEN PPP-MCNC: 393 MG/DL (ref 210–451)
GLUCOSE BLD STRIP.AUTO-MCNC: 117 MG/DL (ref 70–110)
GLUCOSE SERPL-MCNC: 114 MG/DL (ref 74–99)
HCT VFR BLD AUTO: 36.2 % (ref 35–45)
HGB BLD-MCNC: 11.8 G/DL (ref 12–16)
INR PPP: 1 (ref 0.8–1.2)
LDH SERPL L TO P-CCNC: 184 U/L (ref 81–234)
LYMPHOCYTES # BLD: 1.9 K/UL (ref 0.9–3.6)
LYMPHOCYTES NFR BLD: 29 % (ref 21–52)
MAGNESIUM SERPL-MCNC: 1.7 MG/DL (ref 1.6–2.6)
MCH RBC QN AUTO: 30.3 PG (ref 24–34)
MCHC RBC AUTO-ENTMCNC: 32.6 G/DL (ref 31–37)
MCV RBC AUTO: 92.8 FL (ref 74–97)
MONOCYTES # BLD: 0.7 K/UL (ref 0.05–1.2)
MONOCYTES NFR BLD: 10 % (ref 3–10)
NEUTS SEG # BLD: 4 K/UL (ref 1.8–8)
NEUTS SEG NFR BLD: 61 % (ref 40–73)
PLATELET # BLD AUTO: 330 K/UL (ref 135–420)
PMV BLD AUTO: 9 FL (ref 9.2–11.8)
POTASSIUM SERPL-SCNC: 3.3 MMOL/L (ref 3.5–5.5)
PROTHROMBIN TIME: 13.2 SEC (ref 11.5–15.2)
RBC # BLD AUTO: 3.9 M/UL (ref 4.2–5.3)
SODIUM SERPL-SCNC: 140 MMOL/L (ref 136–145)
WBC # BLD AUTO: 6.7 K/UL (ref 4.6–13.2)

## 2020-07-27 PROCEDURE — 74011250637 HC RX REV CODE- 250/637: Performed by: STUDENT IN AN ORGANIZED HEALTH CARE EDUCATION/TRAINING PROGRAM

## 2020-07-27 PROCEDURE — 74011250637 HC RX REV CODE- 250/637: Performed by: INTERNAL MEDICINE

## 2020-07-27 PROCEDURE — 82728 ASSAY OF FERRITIN: CPT

## 2020-07-27 PROCEDURE — 74011000250 HC RX REV CODE- 250: Performed by: INTERNAL MEDICINE

## 2020-07-27 PROCEDURE — 82962 GLUCOSE BLOOD TEST: CPT

## 2020-07-27 PROCEDURE — 71045 X-RAY EXAM CHEST 1 VIEW: CPT

## 2020-07-27 PROCEDURE — 85384 FIBRINOGEN ACTIVITY: CPT

## 2020-07-27 PROCEDURE — 80048 BASIC METABOLIC PNL TOTAL CA: CPT

## 2020-07-27 PROCEDURE — 74011250636 HC RX REV CODE- 250/636: Performed by: STUDENT IN AN ORGANIZED HEALTH CARE EDUCATION/TRAINING PROGRAM

## 2020-07-27 PROCEDURE — 85025 COMPLETE CBC W/AUTO DIFF WBC: CPT

## 2020-07-27 PROCEDURE — 86140 C-REACTIVE PROTEIN: CPT

## 2020-07-27 PROCEDURE — 85379 FIBRIN DEGRADATION QUANT: CPT

## 2020-07-27 PROCEDURE — 74011636637 HC RX REV CODE- 636/637: Performed by: STUDENT IN AN ORGANIZED HEALTH CARE EDUCATION/TRAINING PROGRAM

## 2020-07-27 PROCEDURE — 85730 THROMBOPLASTIN TIME PARTIAL: CPT

## 2020-07-27 PROCEDURE — 83615 LACTATE (LD) (LDH) ENZYME: CPT

## 2020-07-27 PROCEDURE — 83735 ASSAY OF MAGNESIUM: CPT

## 2020-07-27 PROCEDURE — 85610 PROTHROMBIN TIME: CPT

## 2020-07-27 RX ORDER — BENZONATATE 100 MG/1
100 CAPSULE ORAL
Qty: 30 CAP | Refills: 1 | Status: SHIPPED | OUTPATIENT
Start: 2020-07-27 | End: 2020-08-16

## 2020-07-27 RX ORDER — AMOXICILLIN AND CLAVULANATE POTASSIUM 875; 125 MG/1; MG/1
1 TABLET, FILM COATED ORAL 2 TIMES DAILY
Qty: 2 TAB | Refills: 0 | OUTPATIENT
Start: 2020-07-27 | End: 2020-07-27

## 2020-07-27 RX ORDER — AMOXICILLIN AND CLAVULANATE POTASSIUM 875; 125 MG/1; MG/1
1 TABLET, FILM COATED ORAL 2 TIMES DAILY
Qty: 2 TAB | Refills: 0 | Status: SHIPPED | OUTPATIENT
Start: 2020-07-27 | End: 2020-07-28

## 2020-07-27 RX ORDER — BENZONATATE 100 MG/1
100 CAPSULE ORAL
Qty: 30 CAP | Refills: 1 | OUTPATIENT
Start: 2020-07-27 | End: 2020-07-27

## 2020-07-27 RX ADMIN — PANTOPRAZOLE 40 MG: 40 TABLET, DELAYED RELEASE ORAL at 06:59

## 2020-07-27 RX ADMIN — Medication 10 ML: at 07:01

## 2020-07-27 RX ADMIN — IPRATROPIUM BROMIDE AND ALBUTEROL SULFATE 3 ML: .5; 3 SOLUTION RESPIRATORY (INHALATION) at 10:25

## 2020-07-27 RX ADMIN — FUROSEMIDE 20 MG: 20 TABLET ORAL at 10:19

## 2020-07-27 RX ADMIN — AMOXICILLIN AND CLAVULANATE POTASSIUM 1 TABLET: 875; 125 TABLET, FILM COATED ORAL at 10:19

## 2020-07-27 RX ADMIN — GUAIFENESIN 1200 MG: 600 TABLET, EXTENDED RELEASE ORAL at 10:19

## 2020-07-27 RX ADMIN — LISINOPRIL 20 MG: 20 TABLET ORAL at 10:20

## 2020-07-27 RX ADMIN — HYDROCHLOROTHIAZIDE 12.5 MG: 25 TABLET ORAL at 10:20

## 2020-07-27 RX ADMIN — Medication 500 MG: at 10:20

## 2020-07-27 RX ADMIN — ZINC SULFATE 220 MG (50 MG) CAPSULE 1 CAPSULE: CAPSULE at 10:19

## 2020-07-27 RX ADMIN — PREDNISONE 40 MG: 20 TABLET ORAL at 10:19

## 2020-07-27 RX ADMIN — IPRATROPIUM BROMIDE AND ALBUTEROL SULFATE 3 ML: .5; 3 SOLUTION RESPIRATORY (INHALATION) at 06:59

## 2020-07-27 RX ADMIN — ASPIRIN 81 MG: 81 TABLET, COATED ORAL at 10:19

## 2020-07-27 RX ADMIN — CHOLECALCIFEROL TAB 25 MCG (1000 UNIT) 2 TABLET: 25 TAB at 10:19

## 2020-07-27 RX ADMIN — ENOXAPARIN SODIUM 40 MG: 40 INJECTION SUBCUTANEOUS at 10:24

## 2020-07-27 RX ADMIN — Medication 1 CAPSULE: at 10:23

## 2020-07-27 NOTE — HOME CARE
Discharge noted for today. Received home health referral for [facility] for SN, PT, and OT. Spoke with patient, explained services and answered all questions. Demographics verified. Referral processed and emailed to central office. Patient has the following DME: Trilogy from [employer], portable oxygen and oxygen concentrator from [employer], nebulizer, bedside commode, and rolling walker. PCA from [facility].   Renea Bennett, [facility] Liaison

## 2020-07-27 NOTE — DISCHARGE SUMMARY
[facility]  Discharge Summary    Patient: Candace Rodrigez MRN: [id number]  CSN: [id number]    YOB: 1960  Age: [address] y.o. Sex: female      Admission Date: 7/22/2020 Discharge Date: 07/27/2020   Attending: Ankita Bentley MD PCP: Devante Medeiros MD     ===================================================================    Reason for Admission: COPD with acute exacerbation ([address] [J44.1]  COVID-19 virus infection [U07.1]  Acute respiratory disease due to COVID-19 virus [U07.1, J06.9]    Discharge Diagnoses:   COVID-19  Type II Diabetes  HFpEF  GERD  HTN  Morbid Obesity      Important notes to PCP/ follow-up studies and evaluations   COVID Positive on discharge (last test 07/22)  Patient needs to be weaned off of her steroids  She was given 3 days of Augmentin, last dose to be given on 07/28  Blood glucose has been fluctuating during the hospitalization, patient was on steroids. Last A1C of 8.4% (5/22/2020). Consider better management of BG. Pending labs and studies:  None    Operative Procedures:   None    Discharge Medications:     Current Discharge Medication List      START taking these medications    Details   amoxicillin-clavulanate (AUGMENTIN) 875-125 mg per tablet Take 1 Tab by mouth two (2) times a day for 2 doses. Qty: 2 Tab, Refills: 0      benzonatate (TESSALON) 100 mg capsule Take 1 Cap by mouth three (3) times daily as needed for Cough for up to 20 days. Indications: cough  Qty: 30 Cap, Refills: 1         CONTINUE these medications which have NOT CHANGED    Details   predniSONE (DELTASONE) 20 mg tablet Take 40 mg by mouth daily (with breakfast) for 7 days, THEN 20 mg daily (with breakfast) for 7 days. Qty: 21 Tab, Refills: 0      fluticasone propionate (Flovent HFA) 220 mcg/actuation inhaler Take 1 Puff by inhalation two (2) times a day.   Qty: 1 Inhaler, Refills: 0      albuterol-ipratropium (DUO-NEB) 2.5 mg-0.5 mg/3 ml nebu 3 mL by Nebulization route every four (4) hours as needed for Wheezing. Qty: 30 Nebule, Refills: 0      albuterol (PROVENTIL HFA, VENTOLIN HFA, PROAIR HFA) 90 mcg/actuation inhaler Take 2 Puffs by inhalation every four (4) hours as needed for Wheezing. Qty: 1 Inhaler, Refills: 0      azithromycin (ZITHROMAX) 250 mg tablet Take 1 Tab by mouth every Monday, Wednesday, Friday. Monday-Friday. Qty: 30 Tab, Refills: 0    Associated Diagnoses: COPD, group D, by GOLD 2017 classification ([facility] [address]      omeprazole (PRILOSEC) 40 mg capsule Take  by mouth. insulin glargine (Basaglar KwikPen U-100 Insulin) 100 unit/mL (3 mL) inpn 45 Units by SubCUTAneous route nightly. furosemide (Lasix) 20 mg tablet Take 20 mg by mouth daily. lactobacillus sp. 50 billion cpu (BIO-K PLUS) 50 billion cell -375 mg cap capsule Take 1 Cap by mouth daily. Qty: 30 Cap, Refills: 0      simethicone (GAS-X) 125 mg capsule Take 125 mg by mouth four (4) times daily as needed for Flatulence. loratadine (CLARITIN) 10 mg tablet Take 10 mg by mouth daily as needed for Allergies. lisinopril (PRINIVIL, ZESTRIL) 20 mg tablet Take 20 mg by mouth two (2) times a day. fluticasone propion-salmeterol (ADVAIR HFA) 230-21 mcg/actuation inhaler Take 2 Puffs by inhalation two (2) times a day. hydroCHLOROthiazide (HYDRODIURIL) 12.5 mg tablet Take 12.5 mg by mouth daily. tiotropium bromide (SPIRIVA RESPIMAT) 2.5 mcg/actuation inhaler Take 2 Puffs by inhalation daily. NOVOLOG FLEXPEN U-100 INSULIN 100 unit/mL inpn Continue home Sliding scale insulin as prior to admission  Qty: 1 Pen, Refills: 0      OXYGEN-AIR DELIVERY SYSTEMS 2 L by Nasal route continuous. First Choice      aspirin delayed-release 81 mg tablet Take 81 mg by mouth daily. montelukast (SINGULAIR) 10 mg tablet Take 10 mg by mouth nightly.              Disposition: Home with Home Health    Consultants:    [address] B Course (including pertinent history and physical findings)  Hilary De Dios is a 60 y.o. female with PMH significant COPD on home O2, IDDM2, HTN, HFpEF, SHERRIE on CPAP, GERD, allergic rhinitis, recent Covid infection/acute on chronic hypoxic respiratory failure, admitted with worsening dyspnea in the setting of recent covid infection.     The patient had recently been seen, admitted, and discharged from the hospital for COVID-19 pneumonia prior to this admission. She had been feeling well for several days before she started to become more and more dyspneic. After seeing her primary via telehealth, she was told to come to the ER, where she was admitted. On presentation, she was very dyspneic, with tachypnea. She was not hypoxic. She was started on antibiotics, and steroids. She was also given breathing treatments q4h. Her breathing improved significantly. Acute on chronic hypoxic respiratory failure likely 2/2 Covid, in the setting of chronic COPD/asthma overlapping syndrome, HFpEF.  Improved greatly. Work of breathing decreasing and patient feels improved. -Pulm consulted  - Abx, zosyn  - lovenox COVID VTE prophylaxis (BID 40mg)  - PO Prednisone (started 7/26)   - VitC, VitD, zinc  - Pulmicort, Singulair   - Guaifenesin  - tessalon pearls  - claritin if needed      B/l leg swelling, pain, in setting of COVID positive patient.  -negative physical exam for DVT  -b/l PVLs, nothing acute  -was on lovenox prophylaxis dose, increase if DVT to treatment dose.      Insulin dependent Type 2 Diabete: Home lantus 45u pm and novolog SSI. -Currently on Lantus at 50 U, may need more with steroids  -SSI   -Accuchecks ACHS  -Diabetic diet  -Diabetic educator consult  -expect further labile BS due to steroid use     Hypertension, HFpEF: ECHO (5/24/20) ZF 57 - 70%. No regional wall motion abnormality. Moderate (grade 2) left ventricular diastolic dysfunction. Mildly dilated left atrium. Pulmonary hypertension. Pulmonary arterial systolic pressure is 61 mmHg.  Severely elevated central venous pressure (15+ mmHg); IVC diameter is larger than 21 mm and collapses less than 50% with respiration. SBP in ED elevated  to 140s-170s. Pt on lisinopril 20mg BID and HCTZ 12.5mg daily at home. Patient endorsed increased swelling in Abdomen and legs, feels bloated. - Lasix 20mg daily  - Lisinopril 20mg BID  - HCTZ 12.5 mg daily     GERD: Pt takes omeprazole 40mg daily and pepcid for breakthrough symptoms at home.   - protonix 40mg daily  -Simethicone PRN     Morbid obesity  -Diabetic diet   CURRENT ADMISSION IMAGING RESULTS   Xr Chest Port    Result Date: 7/27/2020  IMPRESSION: 1. Persistent infiltrates at the left greater than right mid to lower lungs. 2. Stable mild cardiomegaly. 3. Mild pulmonary vascular congestion. Thank you for enabling us to participate in the care of this patient. Xr Chest Port    Result Date: 7/25/2020  IMPRESSION: Patchy left-sided infiltrates. Decreased penetration through the cardiac silhouette. Top normal cardiac size is stable mild cardiomegaly. . Atherosclerosis. Xr Chest Port    Result Date: 7/23/2020  IMPRESSION: Persistent patchy infiltrates at the mid and lower lungs poorly visualized by portable technique. Stable cardiomegaly. Pulmonary vascular engorgement    Xr Chest Port    Result Date: 7/22/2020  Impression:  1. Bilateral interstitial and airspace opacities, concerning for multifocal infiltrate, including possibility of Covid 19 pneumonia. Superimposed edema not excluded. Xr Knee Rt 3 V    Result Date: 7/23/2020  Impression: 1. No acute fracture or dislocation.          Cardiology Procedures/Testing:  MODALITY RESULTS   EKG Results for orders placed or performed during the hospital encounter of 07/22/20   EKG, 12 LEAD, INITIAL   Result Value Ref Range    Ventricular Rate 80 BPM    Atrial Rate 80 BPM    P-R Interval 142 ms    QRS Duration 88 ms    Q-T Interval 378 ms    QTC Calculation (Bezet) 435 ms    Calculated P Axis 22 degrees    Calculated R Axis 0 degrees    Calculated T Axis 40 degrees    Diagnosis       Normal sinus rhythm  Cannot rule out Anterior infarct , age undetermined  Abnormal ECG  When compared with ECG of 09-JUL-2020 15:17,  No significant change was found  Confirmed by Cierra Ace MD, Javier Cabral ([id number]) on 7/22/2020 5:14:56 PM         ECHO 05/21/20   ECHO ADULT COMPLETE 05/25/2020 5/25/2020    Narrative · Image quality for this study was technically difficult. Contrast used:   DEFINITY. · Normal cavity size and systolic function (ejection fraction normal). Moderate concentric hypertrophy. Estimated left ventricular ejection   fraction is 65 - 70%. Visually measured ejection fraction. No regional   wall motion abnormality noted. Moderate (grade 2) left ventricular   diastolic dysfunction. · Mildly dilated left atrium. · Pulmonary hypertension. Pulmonary arterial systolic pressure is 61 mmHg. · Severely elevated central venous pressure (15+ mmHg); IVC diameter is   larger than 21 mm and collapses less than 50% with respiration. Signed by: Vito Up MD      Nuclear Medicine Results from [facility] Encounter encounter on 09/09/13   NM LUNG PERFUSION W VENT    Impression IMPRESSION:    Low probability of pulmonary embolism. Results from [place] encounter on 09/04/13   TRANSCRIBED NUCLEAR MEDICINE   Results from Hospital Encounter encounter on 12/06/12   NM LUNG VENT/PERF    Impression IMPRESSION:    Very low probability for pulmonary embolus. IR No results found for this or any previous visit.    CATH       Special Testing/Procedures:  MODALITY RESULTS   MICRO All Micro Results     Procedure Component Value Units Date/Time    CULTURE, RESPIRATORY/SPUTUM/BRONCH Louise Cranker [[id number]] Collected:  07/23/20 0645    Order Status:  Canceled Specimen:  Sputum     LEGIONELLA PNEUMOPHILA Diane Lebron URINE [[id number]] Collected:  07/23/20 0645    Order Status:  Canceled Specimen:  Urine     STREP PNEUMO AG, URINE [[id number]] Collected:  07/23/20 0645    Order Status:  Canceled Specimen:  Urine          ABG Lab Results   Component Value Date/Time    pH (POC) 7.36 07/09/2020 11:21 PM    pCO2 (POC) 43.2 07/09/2020 11:21 PM    pO2 (POC) 209 (H) 07/09/2020 11:21 PM    HCO3 (POC) 24.1 07/09/2020 11:21 PM    FIO2 (POC) 40 05/21/2020 11:00 PM      UA No results found for this or any previous visit. ENDO [id number]   [unfilled]          Laboratory Results:  LABORATORY RESULTS   HEMATOLOGY Lab Results   Component Value Date/Time    WBC 6.7 07/27/2020 06:51 AM    HGB 11.8 (L) 07/27/2020 06:51 AM    HCT 36.2 07/27/2020 06:51 AM    PLATELET 262 99/73/2128 06:51 AM    MCV 92.8 07/27/2020 06:51 AM       CHEMISTRIES Lab Results   Component Value Date/Time    Sodium 140 07/27/2020 06:51 AM    Potassium 3.3 (L) 07/27/2020 06:51 AM    Chloride 102 07/27/2020 06:51 AM    CO2 33 (H) 07/27/2020 06:51 AM    Anion gap 5 07/27/2020 06:51 AM    Glucose 114 (H) 07/27/2020 06:51 AM    BUN 20 (H) 07/27/2020 06:51 AM    Creatinine 0.75 07/27/2020 06:51 AM    BUN/Creatinine ratio 27 (H) 07/27/2020 06:51 AM    GFR est AA >60 07/27/2020 06:51 AM    GFR est non-AA >60 07/27/2020 06:51 AM    Calcium 9.2 07/27/2020 06:51 AM      HEPATIC FUNCTION Lab Results   Component Value Date/Time    Albumin 2.8 (L) 07/22/2020 12:24 PM    Bilirubin, direct <0.1 02/08/2017 10:00 PM    Bilirubin, total 0.5 07/22/2020 12:24 PM    Protein, total 6.5 07/22/2020 12:24 PM    Globulin 3.7 07/22/2020 12:24 PM    A-G Ratio 0.8 07/22/2020 12:24 PM    ALT (SGPT) 47 07/22/2020 12:24 PM    Alk.  phosphatase 66 07/22/2020 12:24 PM       LACTIC ACID Lab Results   Component Value Date/Time    Lactic acid 1.2 07/09/2020 03:53 PM    Lactic acid 2.2 () 05/26/2020 05:39 AM    Lactic acid 3.4 () 05/26/2020 12:45 AM      CARDIAC PANEL Lab Results   Component Value Date/Time    CK 48 07/07/2020 04:15 PM    CK - MB <1.0 07/07/2020 04:15 PM    CK-MB Index  07/07/2020 04:15 PM     CALCULATION NOT PERFORMED WHEN RESULT IS BELOW LINEAR LIMIT    Troponin-I, QT <0.02 07/09/2020 03:53 PM      NT-proBNP Lab Results   Component Value Date/Time    NT pro- 07/22/2020 12:24 PM    NT pro- 07/09/2020 03:53 PM    NT pro- 07/07/2020 04:15 PM    NT pro-BNP 95 06/03/2020 11:05 PM    NT pro-BNP 64 05/22/2020 04:08 PM      THYROID Lab Results   Component Value Date/Time    TSH 2.76 09/18/2016 02:20 PM      LIPID PANEL Lab Results   Component Value Date/Time    Cholesterol, total 220 (H) 11/20/2012 03:22 AM    HDL Cholesterol 62 (H) 11/20/2012 03:22 AM    LDL, calculated 144.6 (H) 11/20/2012 03:22 AM    VLDL, calculated 13.4 11/20/2012 03:22 AM    Triglyceride 67 11/20/2012 03:22 AM    CHOL/HDL Ratio 3.5 11/20/2012 03:22 AM         RISK CALCULATORS:  SCORE RESULT   ASCVD The ASCVD Risk score (Tia Richter et al., 2013) failed to calculate for the following reasons:    ASCVD risk score not calculated    [id number]     HAS-BLED    READMISSION RISK SCORE High Risk            40       Total Score        3 Has Seen PCP in Last 6 Months (Yes=3, No=0)    11 IP Visits Last 12 Months (1-3=4, 4=9, >4=11)    9 Pt. Coverage (Medicare=5 , Medicaid, or Self-Pay=4)    17 Charlson Comorbidity Score (Age + Comorbid Conditions)        Criteria that do not apply:    . Living with Significant Other. Assisted Living. LTAC. SNF.  or   Rehab    Patient Length of Stay (>5 days = 3)           Functional status and cognitive function:    Ambulates with: Wheelchair  Status: alert, cooperative, no distress, appears stated age  Condition: STABLE  Disposition: Home with Home Health    Diet: Diabetic Diet, calorie controlled    Code status and advanced care plan: Full    Point of Contact Marciano Argueta  Relationship:Daughter  [phone]     Patient Education:  Patient was educated on the following topics prior to discharge: Diabetes     Follow-up:   Follow-up Information     Follow up With Specialties Details Why Contact Lizabeth Posada MD Family Medicine Go on 7/31/2020 Virtual Appt @ [address]  [address] [facility]  [phone]            =================================================================    Jono Mock MD, PGY-1   [address] Medicine   Intern Pager: [phone]   July 27, 2020, 12:40 PM

## 2020-07-27 NOTE — PROGRESS NOTES
Discharge order noted for today. Pt has been accepted to [facility] agency. Met with patient and daughter and are agreeable to the transition plan today. Transport has been arranged through daughter. Patient's discharge summary and home health  orders have been forwarded to [facility] home health  agency via [address] Updated bedside RN,  to the transition plan.   Discharge information has been documented on the AVS.       Genoveva Jimenez RN BSN  Care Manager  [phone]

## 2020-07-27 NOTE — PROGRESS NOTES
[employer] [facility]. Pulmonary, Critical Care, and Sleep Medicine    Progress Note    Name: Marybel Whitt MRN: [id number]   : 1960 Hospital: [facility]   Date: 2020        This patient has been seen and evaluated at the request of Dr. Gwen Mar for severe dyspnea. IMPRESSION:   · Acute exacerbation of COPD/asthma overlap syndrome -- Secondary to COVID-19 pneumonia/sepsis  · Acute on chronic hypoxic and hypercapnic respiratory failure. O2 requirements increased to 4 LPM, baseline 2 LPM  · Underlying poorly controlled severe persistent asthma with steroid dependence  · COPD, gold D  · COVID-19 pneumonia/severe sepsis   · Worsening dyspnea/SOB:  Etiology multifactorial, due to above  · Morbid obesity:Body mass index is 47.3 kg/m².   · Obesity hypoventilation syndrome with SHERRIE: Patient on trilogy in the [address] [place] [address] setting at home  · Chronic steroid dependence  · Previous smoking hx:  Quit 14 years ago  · CHFpEF  · Pulm HTN:  WHO groups 2 and 3 disease     Patient Active Problem List   Diagnosis Code    Essential hypertension, benign I10    Diabetes mellitus, type 2 ([facility] E11.9    Esophageal reflux K21.9    SHERRIE on CPAP G47.33, Z99.89    COPD (chronic obstructive pulmonary disease) ([facility] J44.9    Allergic rhinitis J30.9    COPD with acute exacerbation ([facility]) J44.1    Obesity, Class III, BMI 40-49.9 (morbid obesity) ([facility]) E66.01    Chest pain R07.9    Dyspnea R06.00    COPD exacerbation ([facility]) J44.1    Acute exacerbation of COPD with asthma ([facility]) J44.1, E51.897    Diastolic CHF, chronic ([facility]) I50.32    Diverticulosis K57.90    COPD with acute bronchitis ([facility]) J44.0, J20.9    Hyperlipidemia E78.5    Type 2 diabetes with nephropathy ([facility]) E11.21    Bilateral carotid bruits R09.89    Palpitations R00.2    Acute exacerbation of chronic obstructive pulmonary disease (COPD) ([facility]) J44.1    Atelectasis of right lung J98.11    Hypoxia R09.02    COVID-19 U07.1  Hypokalemia E87.6    COVID-19 virus infection U07.1    Acute respiratory disease due to COVID-19 virus U07.1, J06.9      RECOMMENDATIONS:   Patient already received course of Remdesevir on prior admission, no further indication for additional doses  Repeat COVID test remains positive  Continue bronchodilators: duonebs q4h, pulmicort nebs 1mg BID, and albuterol PRN  Please hold home bronchodilators. Resume on discharge - Advair and Spiriva  Prednisone taper  Diuresis per primary service  Antibiotics per primary service. With low procalcitonin and no leukocytosis, advise weaning antibiotics rapidly  Supplemental oxygen to maintain SpO2 >88%. Increased FiO2 compared with baseline  Bipap-ST QHS and PRN -- can do AVAPS-ST mode if possible   Frequent incentive spirometry  Aspiration precautions including elevating HOB >30deg  Will arrange for outpatient follow up, virtual visit if not COVID negative yet    F/U with infusion center for benralizumab therapy as an outpatient once infection has cleared  Discussed with primary service      Subjective:   07/27/20  Patient seen and examined at bedside this morning. No acute events overnight. Reports that she is receiving breathing treatments and steroids and dyspnea is back to baseline. Patient reports no worsening of sputum, no hemoptysis. Denies any nausea, vomiting, diarrhea. Supplemental O2 is at 4 LPM      HPI:   Patient is a 60 y.o. female with a past medical history of/overlap syndrome, chronic steroid dependence, obesity hypoventilation syndrome on home trilogy machine, morbid obesity, CHFpEF, chronic hypoxic respiratory failure presented to [facility] with complaints of worsening shortness of breath and dyspnea. Patient was recently discharged from [facility] last week for hospitalization for COVID-19 pneumonia/sepsis. During that hospitalization, patient received Remdesevir, IV steroids, convalescent plasma.   Patient was discharged on a prednisone taper, notes that she was using nebulizer twice on the weekend, but then did not use it for the next few days, and noted worsening shortness of breath and dyspnea. Patient reports that although she was using her home inhalers and was taking prednisone, she reports that she continued to worsen. Dyspnea was present at rest, patient found it very difficult to breathe, so she presented back to the ER. Patient was admitted by the family medicine service who consulted me. Of note, patient did a telehealth visit with her PCP earlier today, and was advised to go to the ER. Patient was reporting shortness of breath with intermittent cough, fatigue, lower extremity pain, with no alleviating factors. Patient reports feeling very poorly, reports that very difficult for her to walk and breathe. Bilateral lower extremity duplex was done in the evening, prelim report was negative. Patient denies any hemoptysis, chest pain, vomiting, diarrhea, worsening LE swelling    Past Medical History:   Diagnosis Date    Asthma     Chronic lung disease     COPD     Cystocele, midline     Diabetes mellitus ([facility]     GERD (gastroesophageal reflux disease)     Hidradenitis suppurativa     Hyperlipidemia     Hypertension     SHERRIE on CPAP     CPAP    Stress incontinence       Past Surgical History:   Procedure Laterality Date    BREAST SURGERY PROCEDURE UNLISTED      Right breast benign tumor removal    HX APPENDECTOMY      HX CHOLECYSTECTOMY      HX DILATION AND CURETTAGE      Dysfunctional uterine bleeding, thought 2/2 fibroids    HX TUBAL LIGATION        Prior to Admission medications    Medication Sig Start Date End Date Taking? Authorizing Provider   amoxicillin-clavulanate (AUGMENTIN) 875-125 mg per tablet Take 1 Tab by mouth two (2) times a day for 2 doses.  7/27/20 7/28/20 Yes Jeni Garcia, DO   benzonatate (TESSALON) 100 mg capsule Take 1 Cap by mouth three (3) times daily as needed for Cough for up to 20 days. Indications: cough 7/27/20 8/16/20 Yes Jeni Garcia,    predniSONE (DELTASONE) 20 mg tablet Take 40 mg by mouth daily (with breakfast) for 7 days, THEN 20 mg daily (with breakfast) for 7 days. 7/19/20 8/2/20 Yes Danilo Barrett MD   fluticasone propionate (Flovent HFA) 220 mcg/actuation inhaler Take 1 Puff by inhalation two (2) times a day. 7/7/20  Yes Evonne Kate PA-C   albuterol-ipratropium (DUO-NEB) 2.5 mg-0.5 mg/3 ml nebu 3 mL by Nebulization route every four (4) hours as needed for Wheezing. 7/7/20  Yes Evonne Kate PA-C   albuterol (PROVENTIL HFA, VENTOLIN HFA, PROAIR HFA) 90 mcg/actuation inhaler Take 2 Puffs by inhalation every four (4) hours as needed for Wheezing. 7/7/20  Yes Evonne Kate PA-C   azithromycin [facility]) 250 mg tablet Take 1 Tab by mouth every Monday, Wednesday, Friday. Monday-Friday. 7/3/20  Yes Ramona Farfan MD   omeprazole (PRILOSEC) 40 mg capsule Take  by mouth. 3/9/18  Yes Provider, Historical   insulin glargine (Basaglar KwikPen U-100 Insulin) 100 unit/mL (3 mL) inpn 45 Units by SubCUTAneous route nightly. Yes Provider, Historical   furosemide (Lasix) 20 mg tablet Take 20 mg by mouth daily. Yes Provider, Historical   lactobacillus sp. 50 billion cpu (BIO-K PLUS) 50 billion cell -375 mg cap capsule Take 1 Cap by mouth daily. 3/10/20  Yes Brianne Rice MD   simethicone (GAS-X) 125 mg capsule Take 125 mg by mouth four (4) times daily as needed for Flatulence. Yes Provider, Historical   loratadine (CLARITIN) 10 mg tablet Take 10 mg by mouth daily as needed for Allergies. Yes Provider, Historical   lisinopril (PRINIVIL, ZESTRIL) 20 mg tablet Take 20 mg by mouth two (2) times a day. Yes Provider, Historical   fluticasone propion-salmeterol (ADVAIR HFA) 230-21 mcg/actuation inhaler Take 2 Puffs by inhalation two (2) times a day.    Yes Provider, Historical   hydroCHLOROthiazide (HYDRODIURIL) 12.5 mg tablet Take 12.5 mg by mouth daily. Yes Provider, Historical   tiotropium bromide (SPIRIVA RESPIMAT) 2.5 mcg/actuation inhaler Take 2 Puffs by inhalation daily. Yes Provider, Historical   NOVOLOG FLEXPEN U-100 INSULIN 100 unit/mL inpn Continue home Sliding scale insulin as prior to admission  Patient taking differently: 1 Units by SubCUTAneous route three (3) times daily. If BG <100=0u  101-150=5u  151-250=8u  251-300=12u  >300 call MD   7/10/18  Yes Vivian Butts MD   OXYGEN-AIR DELIVERY SYSTEMS 2 L by Nasal route continuous. First Choice   Yes Provider, Historical   aspirin delayed-release 81 mg tablet Take 81 mg by mouth daily. Yes Provider, Historical   montelukast (SINGULAIR) 10 mg tablet Take 10 mg by mouth nightly. Yes Other, MD Lauren     Allergies   Allergen Reactions    Ancef [Cefazolin] Hives    Contrast Agent [Iodine] Anaphylaxis, Shortness of Breath and Swelling     Needs pre-medication for IV contrast with Benadryl, Solu-Medrol    Fish Containing Products Anaphylaxis     Pt states she had a severe allergic reaction at 7 y/o.  Statins-Hmg-Coa Reductase Inhibitors Myalgia    Metformin Other (comments)     Abdominal pain, diarrhea.     Codeine Other (comments)     Altered mental status      Social History     Tobacco Use    Smoking status: Former Smoker     Packs/day: 1.00     Years: 30.00     Pack years: 30.00     Types: Cigarettes     Start date: 1966     Last attempt to quit: 2006     Years since quittin.8    Smokeless tobacco: Former User    Tobacco comment: 1-1.5 packs per day   Substance Use Topics    Alcohol use: No      Family History   Problem Relation Age of Onset    Hypertension Mother     Stroke Mother     Breast Cancer Mother         Bilateral mastectomies    Cancer Mother         ovarian and breast    Heart Failure Mother     Heart Attack Father         2011    Heart Surgery Father         CABG    Heart Failure Father     COPD Sister         Heavy smoker    Cancer Sister ovarian    Heart Failure Sister     Lung Disease Sister     Asthma Child     Cancer Maternal Aunt         pancreatic    Cancer Maternal Grandfather         stomach        Current Facility-Administered Medications   Medication Dose Route Frequency    predniSONE (DELTASONE) tablet 40 mg  40 mg Oral DAILY WITH BREAKFAST    amoxicillin-clavulanate (AUGMENTIN) 875-125 mg per tablet 1 Tab  1 Tab Oral Q12H    insulin glargine (LANTUS) injection 50 Units  50 Units SubCUTAneous QHS    pantoprazole (PROTONIX) tablet 40 mg  40 mg Oral ACB    aspirin delayed-release tablet 81 mg  81 mg Oral DAILY    furosemide (LASIX) tablet 20 mg  20 mg Oral DAILY    hydroCHLOROthiazide (HYDRODIURIL) tablet 12.5 mg  12.5 mg Oral DAILY    lactobacillus sp. 50 billion cpu (BIO-K PLUS) capsule 1 Cap  1 Cap Oral DAILY    lisinopriL (PRINIVIL, ZESTRIL) tablet 20 mg  20 mg Oral BID    montelukast (SINGULAIR) tablet 10 mg  10 mg Oral QHS    sodium chloride (NS) flush 5-40 mL  5-40 mL IntraVENous Q8H    insulin lispro (HUMALOG) injection   SubCUTAneous AC&HS    ascorbic acid (vitamin C) (VITAMIN C) tablet 500 mg  500 mg Oral BID    zinc sulfate (ZINCATE) 220 (50) mg capsule 1 Cap  1 Cap Oral DAILY    cholecalciferol (VITAMIN D3) (1000 Units /25 mcg) tablet 2 Tab  2,000 Units Oral DAILY    enoxaparin (LOVENOX) injection 40 mg  40 mg SubCUTAneous Q12H    guaiFENesin ER (MUCINEX) tablet 1,200 mg  1,200 mg Oral Q12H    albuterol-ipratropium (DUO-NEB) 2.5 MG-0.5 MG/3 ML  3 mL Nebulization Q4H RT    budesonide (PULMICORT) 1,000 mcg/2 mL nebulizer susp  1,000 mcg Nebulization BID RT       Review of Systems:  A comprehensive ROS was obtained as stated in HPI, all others are negative      Objective:   Vital Signs:    Visit Vitals  /54   Pulse 70   Temp 97.9 °F (36.6 °C)   Resp 15   Ht 5' 3\" (1.6 m)   Wt 121.1 kg (267 lb)   SpO2 93%   BMI 47.30 kg/m²       O2 Device: Nasal cannula   O2 Flow Rate (L/min): 4 l/min   No data recorded. Intake/Output:   Last shift:      No intake/output data recorded. Last 3 shifts: No intake/output data recorded. No intake or output data in the 24 hours ending 07/27/20 1353   Physical Exam:   General:  Alert, cooperative, no respiratory distress sitting upright in stretcher with 1 leg off, wearing nasal cannula. Morbidly obese   Head:  Normocephalic, without obvious abnormality, atraumatic. Eyes:  Conjunctivae/corneas clear. ANicteric, PERRLA, EOMI   Nose: Deferred    Throat: Deferred    Neck: Supple, symmetrical, trachea midline, no adenopathy, thyroid: no enlargment/tenderness/nodules, no carotid bruit and no JVD. No crepitus   Lungs:    distant breath sounds, faint upper lung field wheezes, no crackles   Heart:  Regular rate and rhythm, S1, S2 normal, no murmur, click, rub or gallop. Abdomen:   Soft, obese, non-tender. Bowel sounds normal. No masses,  No organomegaly. No paradoxical motion   Extremities: normal, atraumatic, no cyanosis or clubbing. Trace edema bilaterally in lower extremities   Pulses: 1-2+ and symmetric all extremities.    Skin: Skin color, texture, turgor normal. No rashes or lesions   Lymph nodes: Cervical, supraclavicular nodes normal.   Neurologic: Grossly nonfocal, strength and coordination grossly intact throughout all extremities, sensation also grossly intact          Data review:   Labs:  Recent Results (from the past 24 hour(s))   GLUCOSE, POC    Collection Time: 07/26/20  5:32 PM   Result Value Ref Range    Glucose (POC) 313 (H) 70 - 110 mg/dL   GLUCOSE, POC    Collection Time: 07/26/20 10:25 PM   Result Value Ref Range    Glucose (POC) 164 (H) 70 - 110 mg/dL   GLUCOSE, POC    Collection Time: 07/27/20  6:39 AM   Result Value Ref Range    Glucose (POC) 117 (H) 70 - 792 mg/dL   METABOLIC PANEL, BASIC    Collection Time: 07/27/20  6:51 AM   Result Value Ref Range    Sodium 140 136 - 145 mmol/L    Potassium 3.3 (L) 3.5 - 5.5 mmol/L    Chloride 102 100 - 111 mmol/L CO2 33 (H) 21 - 32 mmol/L    Anion gap 5 3.0 - 18 mmol/L    Glucose 114 (H) 74 - 99 mg/dL    BUN 20 (H) 7.0 - 18 MG/DL    Creatinine 0.75 0.6 - 1.3 MG/DL    BUN/Creatinine ratio 27 (H) 12 - 20      GFR est AA >60 >60 ml/min/1.73m2    GFR est non-AA >60 >60 ml/min/1.73m2    Calcium 9.2 8.5 - 10.1 MG/DL   MAGNESIUM    Collection Time: 07/27/20  6:51 AM   Result Value Ref Range    Magnesium 1.7 1.6 - 2.6 mg/dL   CBC WITH AUTOMATED DIFF    Collection Time: 07/27/20  6:51 AM   Result Value Ref Range    WBC 6.7 4.6 - 13.2 K/uL    RBC 3.90 (L) 4.20 - 5.30 M/uL    HGB 11.8 (L) 12.0 - 16.0 g/dL    HCT 36.2 35.0 - 45.0 %    MCV 92.8 74.0 - 97.0 FL    MCH 30.3 24.0 - 34.0 PG    MCHC 32.6 31.0 - 37.0 g/dL    RDW 14.1 11.6 - 14.5 %    PLATELET 819 897 - 489 K/uL    MPV 9.0 (L) 9.2 - 11.8 FL    NEUTROPHILS 61 40 - 73 %    LYMPHOCYTES 29 21 - 52 %    MONOCYTES 10 3 - 10 %    EOSINOPHILS 0 0 - 5 %    BASOPHILS 0 0 - 2 %    ABS. NEUTROPHILS 4.0 1.8 - 8.0 K/UL    ABS. LYMPHOCYTES 1.9 0.9 - 3.6 K/UL    ABS. MONOCYTES 0.7 0.05 - 1.2 K/UL    ABS. EOSINOPHILS 0.0 0.0 - 0.4 K/UL    ABS.  BASOPHILS 0.0 0.0 - 0.1 K/UL    DF AUTOMATED     LD    Collection Time: 07/27/20  6:51 AM   Result Value Ref Range     81 - 234 U/L   FERRITIN    Collection Time: 07/27/20  6:51 AM   Result Value Ref Range    Ferritin 36 8 - 388 NG/ML   C REACTIVE PROTEIN, QT    Collection Time: 07/27/20  6:51 AM   Result Value Ref Range    C-Reactive protein 0.8 (H) 0 - 0.3 mg/dL   PROTHROMBIN TIME + INR    Collection Time: 07/27/20  7:24 AM   Result Value Ref Range    Prothrombin time 13.2 11.5 - 15.2 sec    INR 1.0 0.8 - 1.2     PTT    Collection Time: 07/27/20  7:24 AM   Result Value Ref Range    aPTT 24.6 23.0 - 36.4 SEC   FIBRINOGEN    Collection Time: 07/27/20  7:24 AM   Result Value Ref Range    Fibrinogen 393 210 - 451 mg/dL   D DIMER    Collection Time: 07/27/20  7:24 AM   Result Value Ref Range    D DIMER <0.27 <0.46 ug/ml(FEU)     ABG:  No results found for: PH, PHI, PCO2, PCO2I, PO2, PO2I, HCO3, HCO3I, FIO2, FIO2I      PFT Results  (Last 48 hours)    None        Echo Results  (Last 48 hours)    None        Imaging:  I have personally reviewed the patients radiographs and have reviewed the reports:  Chest x-ray from today shows bilateral interstitial infiltrates consistent with multifocal pneumonia from COVID-19 sepsis and possible fluid overload  CXR Results  (Last 48 hours)               07/27/20 0921  XR CHEST PORT Final result    Impression:  IMPRESSION:   1. Persistent infiltrates at the left greater than right mid to lower lungs. 2. Stable mild cardiomegaly. 3. Mild pulmonary vascular congestion. Thank you for enabling us to participate in the care of this patient. Narrative:  EXAM: CHEST PORTABLE        CLINICAL HISTORY/INDICATION: covid, shortness of breath       COMPARISON: Multiple prior chest radiographs, most recently dating 7/25/2020. TECHNIQUE: Portable AP view was obtained. FINDINGS:        LINES/DEVICES: None. HEART/MEDIASTINUM: Mildly enlarged cardiac silhouette, unchanged. Atherosclerotic calcifications at the arch of the aorta. LUNGS: Persistent opacities at the left greater than right mid to lower lungs. Mild pulmonary vascular congestion, unchanged. No pneumothorax. SOFT TISSUES: Unremarkable. BONES: Unremarkable for age.                CT Results  (Last 48 hours)    None            High complexity decision making was performed during the evaluation of this patient at high risk for decompensation with multiple organ involvement    Above mentioned total time spent on reviewing the case/medical record/data/notes/EMR/patient examination/documentation/coordinating care with nurse/consultants, exclusive of procedures with complex decision making performed and > 50% time spent in face to face evaluation, and coordination of care         Jignesh Boyce MD     Pulmonary, [facility]

## 2020-07-27 NOTE — ED NOTES
Patient refused Duoneb breathing treatment at this time. Patient is currently on BiPAP and said she is doing fine at this time. Will offer breathing treatment again at 0400. Will continue to monitor.

## 2020-07-29 ENCOUNTER — HOME CARE VISIT (OUTPATIENT)
Dept: SCHEDULING | Facility: HOME HEALTH | Age: 60
End: 2020-07-29
Payer: MEDICARE

## 2020-07-29 PROCEDURE — 3331090002 HH PPS REVENUE DEBIT

## 2020-07-29 PROCEDURE — 3331090001 HH PPS REVENUE CREDIT

## 2020-07-29 PROCEDURE — 400013 HH SOC

## 2020-07-29 PROCEDURE — G0151 HHCP-SERV OF PT,EA 15 MIN: HCPCS

## 2020-07-29 PROCEDURE — G0299 HHS/HOSPICE OF RN EA 15 MIN: HCPCS

## 2020-07-30 ENCOUNTER — HOME CARE VISIT (OUTPATIENT)
Dept: HOME HEALTH SERVICES | Facility: HOME HEALTH | Age: 60
End: 2020-07-30
Payer: MEDICARE

## 2020-07-30 ENCOUNTER — HOSPITAL ENCOUNTER (EMERGENCY)
Age: 60
Discharge: HOME | End: 2020-07-30
Attending: EMERGENCY MEDICINE | Admitting: EMERGENCY MEDICINE
Payer: MEDICARE

## 2020-07-30 ENCOUNTER — APPOINTMENT (OUTPATIENT)
Dept: CT IMAGING | Age: 60
End: 2020-07-30
Attending: EMERGENCY MEDICINE
Payer: MEDICARE

## 2020-07-30 VITALS
RESPIRATION RATE: 16 BRPM | DIASTOLIC BLOOD PRESSURE: 53 MMHG | HEART RATE: 69 BPM | TEMPERATURE: 98.9 F | SYSTOLIC BLOOD PRESSURE: 131 MMHG | OXYGEN SATURATION: 99 %

## 2020-07-30 DIAGNOSIS — K42.9 PERIUMBILICAL HERNIA: Primary | ICD-10-CM

## 2020-07-30 DIAGNOSIS — R10.33 PERIUMBILICAL ABDOMINAL PAIN: ICD-10-CM

## 2020-07-30 LAB
ALBUMIN SERPL-MCNC: 3.8 G/DL (ref 3.4–5)
ALBUMIN/GLOB SERPL: 1.2 {RATIO} (ref 0.8–1.7)
ALP SERPL-CCNC: 61 U/L (ref 45–117)
ALT SERPL-CCNC: 47 U/L (ref 13–56)
ANION GAP SERPL CALC-SCNC: 6 MMOL/L (ref 3–18)
AST SERPL-CCNC: 17 U/L (ref 10–38)
ATRIAL RATE: 65 BPM
BASOPHILS # BLD: 0 K/UL (ref 0–0.1)
BASOPHILS NFR BLD: 0 % (ref 0–2)
BILIRUB SERPL-MCNC: 0.9 MG/DL (ref 0.2–1)
BUN SERPL-MCNC: 20 MG/DL (ref 7–18)
BUN/CREAT SERPL: 22 (ref 12–20)
CALCIUM SERPL-MCNC: 10.1 MG/DL (ref 8.5–10.1)
CALCULATED P AXIS, ECG09: 44 DEGREES
CALCULATED R AXIS, ECG10: 12 DEGREES
CALCULATED T AXIS, ECG11: 43 DEGREES
CHLORIDE SERPL-SCNC: 101 MMOL/L (ref 100–111)
CO2 SERPL-SCNC: 34 MMOL/L (ref 21–32)
CREAT SERPL-MCNC: 0.92 MG/DL (ref 0.6–1.3)
DIAGNOSIS, 93000: NORMAL
DIFFERENTIAL METHOD BLD: ABNORMAL
EOSINOPHIL # BLD: 0.1 K/UL (ref 0–0.4)
EOSINOPHIL NFR BLD: 1 % (ref 0–5)
ERYTHROCYTE [DISTWIDTH] IN BLOOD BY AUTOMATED COUNT: 14.2 % (ref 11.6–14.5)
GLOBULIN SER CALC-MCNC: 3.2 G/DL (ref 2–4)
GLUCOSE SERPL-MCNC: 211 MG/DL (ref 74–99)
HCT VFR BLD AUTO: 41.7 % (ref 35–45)
HGB BLD-MCNC: 13.9 G/DL (ref 12–16)
LIPASE SERPL-CCNC: 72 U/L (ref 73–393)
LYMPHOCYTES # BLD: 2.3 K/UL (ref 0.9–3.6)
LYMPHOCYTES NFR BLD: 22 % (ref 21–52)
MCH RBC QN AUTO: 31 PG (ref 24–34)
MCHC RBC AUTO-ENTMCNC: 33.3 G/DL (ref 31–37)
MCV RBC AUTO: 93.1 FL (ref 74–97)
MONOCYTES # BLD: 0.8 K/UL (ref 0.05–1.2)
MONOCYTES NFR BLD: 8 % (ref 3–10)
NEUTS SEG # BLD: 7.1 K/UL (ref 1.8–8)
NEUTS SEG NFR BLD: 69 % (ref 40–73)
P-R INTERVAL, ECG05: 140 MS
PLATELET # BLD AUTO: 302 K/UL (ref 135–420)
PMV BLD AUTO: 8.9 FL (ref 9.2–11.8)
POTASSIUM SERPL-SCNC: 4 MMOL/L (ref 3.5–5.5)
PROT SERPL-MCNC: 7 G/DL (ref 6.4–8.2)
Q-T INTERVAL, ECG07: 404 MS
QRS DURATION, ECG06: 84 MS
QTC CALCULATION (BEZET), ECG08: 420 MS
RBC # BLD AUTO: 4.48 M/UL (ref 4.2–5.3)
SODIUM SERPL-SCNC: 141 MMOL/L (ref 136–145)
TROPONIN I SERPL-MCNC: <0.02 NG/ML (ref 0–0.04)
VENTRICULAR RATE, ECG03: 65 BPM
WBC # BLD AUTO: 10.2 K/UL (ref 4.6–13.2)

## 2020-07-30 PROCEDURE — 84484 ASSAY OF TROPONIN QUANT: CPT

## 2020-07-30 PROCEDURE — 96367 TX/PROPH/DG ADDL SEQ IV INF: CPT

## 2020-07-30 PROCEDURE — 83690 ASSAY OF LIPASE: CPT

## 2020-07-30 PROCEDURE — 87040 BLOOD CULTURE FOR BACTERIA: CPT

## 2020-07-30 PROCEDURE — 80053 COMPREHEN METABOLIC PANEL: CPT

## 2020-07-30 PROCEDURE — 85025 COMPLETE CBC W/AUTO DIFF WBC: CPT

## 2020-07-30 PROCEDURE — 99284 EMERGENCY DEPT VISIT MOD MDM: CPT

## 2020-07-30 PROCEDURE — 96366 THER/PROPH/DIAG IV INF ADDON: CPT

## 2020-07-30 PROCEDURE — 96365 THER/PROPH/DIAG IV INF INIT: CPT

## 2020-07-30 PROCEDURE — 3331090001 HH PPS REVENUE CREDIT

## 2020-07-30 PROCEDURE — 74176 CT ABD & PELVIS W/O CONTRAST: CPT

## 2020-07-30 PROCEDURE — 93005 ELECTROCARDIOGRAM TRACING: CPT

## 2020-07-30 PROCEDURE — 3331090002 HH PPS REVENUE DEBIT

## 2020-07-30 PROCEDURE — 74011250636 HC RX REV CODE- 250/636: Performed by: EMERGENCY MEDICINE

## 2020-07-30 RX ORDER — METRONIDAZOLE 500 MG/100ML
500 INJECTION, SOLUTION INTRAVENOUS EVERY 8 HOURS
Status: DISCONTINUED | OUTPATIENT
Start: 2020-07-30 | End: 2020-07-30 | Stop reason: HOSPADM

## 2020-07-30 RX ORDER — HYDROMORPHONE HYDROCHLORIDE 1 MG/ML
1 INJECTION, SOLUTION INTRAMUSCULAR; INTRAVENOUS; SUBCUTANEOUS ONCE
Status: COMPLETED | OUTPATIENT
Start: 2020-07-30 | End: 2020-07-30

## 2020-07-30 RX ORDER — LEVOFLOXACIN 5 MG/ML
750 INJECTION, SOLUTION INTRAVENOUS EVERY 24 HOURS
Status: DISCONTINUED | OUTPATIENT
Start: 2020-07-30 | End: 2020-07-30 | Stop reason: HOSPADM

## 2020-07-30 RX ADMIN — METRONIDAZOLE 500 MG: 500 INJECTION, SOLUTION INTRAVENOUS at 09:44

## 2020-07-30 RX ADMIN — HYDROMORPHONE HYDROCHLORIDE 1 MG: 1 INJECTION, SOLUTION INTRAMUSCULAR; INTRAVENOUS; SUBCUTANEOUS at 09:25

## 2020-07-30 RX ADMIN — LEVOFLOXACIN 750 MG: 5 INJECTION, SOLUTION INTRAVENOUS at 10:21

## 2020-07-30 NOTE — DISCHARGE INSTRUCTIONS
Patient Education        Hernia: Care Instructions  Your Care Instructions     A hernia develops when tissue bulges through a weak spot in the wall of your belly. The groin area and the navel are common areas for a hernia. A hernia can also develop near the area of a surgery you had before. Pressure from lifting, straining, or coughing can tear the weak area, causing the hernia to bulge and be painful. If you cannot push a hernia back into place, the tissue may become trapped outside the belly wall. If the hernia gets twisted and loses its blood supply, it will swell and die. This is called a strangulated hernia. It usually causes a lot of pain. It needs treatment right away. Some hernias need to be repaired to prevent a strangulated hernia. If your hernia causes symptoms or is large, you may need surgery. Follow-up care is a key part of your treatment and safety. Be sure to make and go to all appointments, and call your doctor if you are having problems. It's also a good idea to know your test results and keep a list of the medicines you take. How can you care for yourself at home? · Take care when lifting heavy objects. · Stay at a healthy weight. · Do not smoke. Smoking can cause coughing, which can cause your hernia to bulge. If you need help quitting, talk to your doctor about stop-smoking programs and medicines. These can increase your chances of quitting for good. · Talk with your doctor before wearing a corset or truss for a hernia. These devices are not recommended for treating hernias and sometimes can do more harm than good. There may be certain situations when your doctor thinks a truss would work, but these are rare. When should you call for help? Call your doctor now or seek immediate medical care if:  · You have new or worse belly pain. · You are vomiting. · You cannot pass stools or gas. · You cannot push the hernia back into place with gentle pressure when you are lying down.   · The area over the hernia turns red or becomes tender. Watch closely for changes in your health, and be sure to contact your doctor if you have any problems. Where can you learn more? Go to http://www.gray.com/  Enter C129 in the search box to learn more about \"Hernia: Care Instructions. \"  Current as of: August 12, 2019               Content Version: 12.5  © 3783-3840 [employer]. Care instructions adapted under license by [employer] (which disclaims liability or warranty for this information). If you have questions about a medical condition or this instruction, always ask your healthcare professional. [address] any warranty or liability for your use of this information.

## 2020-07-30 NOTE — ED PROVIDER NOTES
EMERGENCY DEPARTMENT HISTORY AND PHYSICAL EXAM    Date: 7/30/2020  Patient Name: Lissette Guerra    History of Presenting Illness     Chief Complaint   Patient presents with    Abdominal Pain         History Provided By: Patient      Additional History (Context): Lissette Guerra is a 60 y.o. female with diabetes, hypertension, hyperlipidemia, obesity, COPD, asthma and COVID, umbilical hernia who presents with periumbilical abd pain x 2d. Thought she could be at first simply constipated so she took dulcolax and had two BM's yesterday but pain persistent. Denies n/v, melena, hematochezia, dysuria. PCP: Pat Watson MD    Current Facility-Administered Medications   Medication Dose Route Frequency Provider Last Rate Last Dose    metroNIDAZOLE (FLAGYL) IVPB premix 500 mg  500 mg IntraVENous Q8H Britt Nathan PA   Stopped at 07/30/20 1258    levoFLOXacin (LEVAQUIN) 750 mg in D5W IVPB  750 mg IntraVENous Q24H Britt Nathan PA   Stopped at 07/30/20 1258     Current Outpatient Medications   Medication Sig Dispense Refill    benzonatate (TESSALON) 100 mg capsule Take 1 Cap by mouth three (3) times daily as needed for Cough for up to 20 days. Indications: cough 30 Cap 1    predniSONE (DELTASONE) 20 mg tablet Take 40 mg by mouth daily (with breakfast) for 7 days, THEN 20 mg daily (with breakfast) for 7 days. 21 Tab 0    fluticasone propionate (Flovent HFA) 220 mcg/actuation inhaler Take 1 Puff by inhalation two (2) times a day. 1 Inhaler 0    albuterol-ipratropium (DUO-NEB) 2.5 mg-0.5 mg/3 ml nebu 3 mL by Nebulization route every four (4) hours as needed for Wheezing. 30 Nebule 0    albuterol (PROVENTIL HFA, VENTOLIN HFA, PROAIR HFA) 90 mcg/actuation inhaler Take 2 Puffs by inhalation every four (4) hours as needed for Wheezing. 1 Inhaler 0    azithromycin (ZITHROMAX) 250 mg tablet Take 1 Tab by mouth every Monday, Wednesday, Friday. Monday-Friday.  30 Tab 0    omeprazole (PRILOSEC) 40 mg capsule Take  by mouth.  insulin glargine (Basaglar KwikPen U-100 Insulin) 100 unit/mL (3 mL) inpn 45 Units by SubCUTAneous route nightly.  furosemide (Lasix) 20 mg tablet Take 20 mg by mouth daily.  lactobacillus sp. 50 billion cpu (BIO-K PLUS) 50 billion cell -375 mg cap capsule Take 1 Cap by mouth daily. 30 Cap 0    simethicone (GAS-X) 125 mg capsule Take 125 mg by mouth four (4) times daily as needed for Flatulence.  loratadine (CLARITIN) 10 mg tablet Take 10 mg by mouth daily as needed for Allergies.  lisinopril (PRINIVIL, ZESTRIL) 20 mg tablet Take 20 mg by mouth two (2) times a day.  fluticasone propion-salmeterol (ADVAIR HFA) 230-21 mcg/actuation inhaler Take 2 Puffs by inhalation two (2) times a day.  hydroCHLOROthiazide (HYDRODIURIL) 12.5 mg tablet Take 12.5 mg by mouth daily.  tiotropium bromide (SPIRIVA RESPIMAT) 2.5 mcg/actuation inhaler Take 2 Puffs by inhalation daily.  NOVOLOG FLEXPEN U-100 INSULIN 100 unit/mL inpn Continue home Sliding scale insulin as prior to admission (Patient taking differently: 1 Units by SubCUTAneous route three (3) times daily. If BG <100=0u  101-150=5u  151-250=8u  251-300=12u  >300 call MD  ) 1 Pen 0    OXYGEN-AIR DELIVERY SYSTEMS 2 L by Nasal route continuous. [facility]      aspirin delayed-release 81 mg tablet Take 81 mg by mouth daily.  montelukast (SINGULAIR) 10 mg tablet Take 10 mg by mouth nightly.          Past History     Past Medical History:  Past Medical History:   Diagnosis Date    Asthma     Chronic lung disease     COPD     Cystocele, midline     Diabetes mellitus ([facility] [address]     GERD (gastroesophageal reflux disease)     Hidradenitis suppurativa     Hyperlipidemia     Hypertension     SHERRIE on CPAP     CPAP    Stress incontinence        Past Surgical History:  Past Surgical History:   Procedure Laterality Date    BREAST SURGERY PROCEDURE UNLISTED      Right breast benign tumor removal    HX APPENDECTOMY      HX CHOLECYSTECTOMY      HX DILATION AND CURETTAGE      Dysfunctional uterine bleeding, thought 2/2 fibroids    HX TUBAL LIGATION         Family History:  Family History   Problem Relation Age of Onset    Hypertension Mother     Stroke Mother     Breast Cancer Mother         Bilateral mastectomies    Cancer Mother         ovarian and breast    Heart Failure Mother     Heart Attack Father         2011    Heart Surgery Father         CABG    Heart Failure Father     COPD Sister         Heavy smoker    Cancer Sister         ovarian    Heart Failure Sister     Lung Disease Sister     Asthma Child     Cancer Maternal Aunt         pancreatic    Cancer Maternal Grandfather         stomach       Social History:  Social History     Tobacco Use    Smoking status: Former Smoker     Packs/day: 1.00     Years: 30.00     Pack years: 30.00     Types: Cigarettes     Start date: 1966     Last attempt to quit: 2006     Years since quittin.9    Smokeless tobacco: Former User    Tobacco comment: 1-1.5 packs per day   Substance Use Topics    Alcohol use: No    Drug use: No       Allergies: Allergies   Allergen Reactions    Ancef [Cefazolin] Hives    Contrast Agent [Iodine] Anaphylaxis, Shortness of Breath and Swelling     Needs pre-medication for IV contrast with Benadryl, Solu-Medrol    Fish Containing Products Anaphylaxis     Pt states she had a severe allergic reaction at 9 y/o.  Statins-Hmg-Coa Reductase Inhibitors Myalgia    Metformin Other (comments)     Abdominal pain, diarrhea.  Codeine Other (comments)     Altered mental status         Review of Systems   Review of Systems   Constitutional: Negative for fever. Respiratory: Negative for shortness of breath. Cardiovascular: Negative for chest pain. Gastrointestinal: Positive for abdominal pain. Negative for constipation, diarrhea, nausea and vomiting. Genitourinary: Negative for dysuria.    Musculoskeletal: Negative for back pain. All Other Systems Negative  Physical Exam     Vitals:    07/30/20 0851 07/30/20 0930 07/30/20 1024 07/30/20 1211   BP:  131/53     Pulse: 70 67  69   Resp: 23 18  16   Temp:   98.9 °F (37.2 °C)    SpO2: 99% 97%  99%     Physical Exam  Vitals signs and nursing note reviewed. Constitutional:       General: She is in acute distress. Appearance: She is well-developed. She is obese. HENT:      Head: Normocephalic and atraumatic. Eyes:      Pupils: Pupils are equal, round, and reactive to light. Neck:      Thyroid: No thyromegaly. Vascular: No JVD. Trachea: No tracheal deviation. Cardiovascular:      Rate and Rhythm: Normal rate and regular rhythm. Heart sounds: Normal heart sounds. No murmur. No friction rub. No gallop. Pulmonary:      Effort: Pulmonary effort is normal. No respiratory distress. Breath sounds: Normal breath sounds. No stridor. No wheezing or rales. Chest:      Chest wall: No tenderness. Abdominal:      General: There is no distension. Palpations: Abdomen is soft. There is no mass. Tenderness: There is abdominal tenderness in the periumbilical area. There is no guarding or rebound. Hernia: A hernia is present. Hernia is present in the umbilical area. Musculoskeletal:         General: No tenderness. Lymphadenopathy:      Cervical: No cervical adenopathy. Skin:     General: Skin is warm and dry. Coloration: Skin is not pale. Findings: No erythema or rash. Neurological:      Mental Status: She is alert and oriented to person, place, and time. Psychiatric:         Behavior: Behavior normal.         Thought Content:  Thought content normal.                Diagnostic Study Results     Labs -     Recent Results (from the past 12 hour(s))   CBC WITH AUTOMATED DIFF    Collection Time: 07/30/20  9:10 AM   Result Value Ref Range    WBC 10.2 4.6 - 13.2 K/uL    RBC 4.48 4.20 - 5.30 M/uL    HGB 13.9 12.0 - 16.0 g/dL HCT 41.7 35.0 - 45.0 %    MCV 93.1 74.0 - 97.0 FL    MCH 31.0 24.0 - 34.0 PG    MCHC 33.3 31.0 - 37.0 g/dL    RDW 14.2 11.6 - 14.5 %    PLATELET 256 039 - 293 K/uL    MPV 8.9 (L) 9.2 - 11.8 FL    NEUTROPHILS 69 40 - 73 %    LYMPHOCYTES 22 21 - 52 %    MONOCYTES 8 3 - 10 %    EOSINOPHILS 1 0 - 5 %    BASOPHILS 0 0 - 2 %    ABS. NEUTROPHILS 7.1 1.8 - 8.0 K/UL    ABS. LYMPHOCYTES 2.3 0.9 - 3.6 K/UL    ABS. MONOCYTES 0.8 0.05 - 1.2 K/UL    ABS. EOSINOPHILS 0.1 0.0 - 0.4 K/UL    ABS. BASOPHILS 0.0 0.0 - 0.1 K/UL    DF AUTOMATED     METABOLIC PANEL, COMPREHENSIVE    Collection Time: 07/30/20  9:10 AM   Result Value Ref Range    Sodium 141 136 - 145 mmol/L    Potassium 4.0 3.5 - 5.5 mmol/L    Chloride 101 100 - 111 mmol/L    CO2 34 (H) 21 - 32 mmol/L    Anion gap 6 3.0 - 18 mmol/L    Glucose 211 (H) 74 - 99 mg/dL    BUN 20 (H) 7.0 - 18 MG/DL    Creatinine 0.92 0.6 - 1.3 MG/DL    BUN/Creatinine ratio 22 (H) 12 - 20      GFR est AA >60 >60 ml/min/1.73m2    GFR est non-AA >60 >60 ml/min/1.73m2    Calcium 10.1 8.5 - 10.1 MG/DL    Bilirubin, total 0.9 0.2 - 1.0 MG/DL    ALT (SGPT) 47 13 - 56 U/L    AST (SGOT) 17 10 - 38 U/L    Alk.  phosphatase 61 45 - 117 U/L    Protein, total 7.0 6.4 - 8.2 g/dL    Albumin 3.8 3.4 - 5.0 g/dL    Globulin 3.2 2.0 - 4.0 g/dL    A-G Ratio 1.2 0.8 - 1.7     LIPASE    Collection Time: 07/30/20  9:10 AM   Result Value Ref Range    Lipase 72 (L) 73 - 393 U/L   TROPONIN I    Collection Time: 07/30/20  9:10 AM   Result Value Ref Range    Troponin-I, QT <0.02 0.0 - 0.045 NG/ML   EKG, 12 LEAD, INITIAL    Collection Time: 07/30/20  9:34 AM   Result Value Ref Range    Ventricular Rate 65 BPM    Atrial Rate 65 BPM    P-R Interval 140 ms    QRS Duration 84 ms    Q-T Interval 404 ms    QTC Calculation (Bezet) 420 ms    Calculated P Axis 44 degrees    Calculated R Axis 12 degrees    Calculated T Axis 43 degrees    Diagnosis       Normal sinus rhythm  Cannot rule out Anterior infarct (cited on or before 22-JUL-2020)  Abnormal ECG  When compared with ECG of 22-JUL-2020 12:34,  No significant change was found         Radiologic Studies -   CT ABD PELV WO CONT   Final Result   Addendum 1 of 1   Addendum: Addendum:      As mentioned in the initial report but not in impression are extensive   peripheral reticular and fibrotic opacities in imaged bilateral lower lung    new   since the CT in 4/20 and concerning subacute interstitial pneumonitis. Clinical   correlation and chest CT evaluation advised. Final        CT Results  (Last 48 hours)    None        CXR Results  (Last 48 hours)    None            Medical Decision Making   I am the first provider for this patient. I reviewed the vital signs, available nursing notes, past medical history, past surgical history, family history and social history. Vital Signs-Reviewed the patient's vital signs. Records Reviewed: Nursing Notes and Old Medical Records    Procedures:  EKG    Date/Time: 7/30/2020 9:34 AM  Performed by: ANALI Leslie  Authorized by: ANALI Leslie     ECG reviewed by ED Physician in the absence of a cardiologist: yes    Interpretation:     Interpretation: non-specific    Rate:     ECG rate:  65    ECG rate assessment: normal    Rhythm:     Rhythm: sinus rhythm    Ectopy:     Ectopy: none    QRS:     QRS axis:  Normal    QRS intervals:  Normal  Conduction:     Conduction: normal    ST segments:     ST segments:  Normal  T waves:     T waves: normal          Provider Notes (Medical Decision Making): immediately attempted to reduce hernia; pt placed in reverse trendelenberg and tolerted attempt better w/Dilaudid. Interval improvement but w/concern on CT scan for portion still possibly strangulated. Also had ED attending perform exam and further hernia reduction attempt. The most superior portion is still palpable and 'sore' but no longer tender. She does not have a leukocytosis or fever and tolerated PO.   Spoke with general surgery, Tavares Diego, who also reviewed films and will have pt f/up w/him in his office. Ambulated, ate w/o increase in discomfort. Clinically, she's having bowel movements so no obstruction clinically. MED RECONCILIATION:  Current Facility-Administered Medications   Medication Dose Route Frequency    metroNIDAZOLE (FLAGYL) IVPB premix 500 mg  500 mg IntraVENous Q8H    levoFLOXacin (LEVAQUIN) 750 mg in D5W IVPB  750 mg IntraVENous Q24H     Current Outpatient Medications   Medication Sig    benzonatate (TESSALON) 100 mg capsule Take 1 Cap by mouth three (3) times daily as needed for Cough for up to 20 days. Indications: cough    predniSONE (DELTASONE) 20 mg tablet Take 40 mg by mouth daily (with breakfast) for 7 days, THEN 20 mg daily (with breakfast) for 7 days.  fluticasone propionate (Flovent HFA) 220 mcg/actuation inhaler Take 1 Puff by inhalation two (2) times a day.  albuterol-ipratropium (DUO-NEB) 2.5 mg-0.5 mg/3 ml nebu 3 mL by Nebulization route every four (4) hours as needed for Wheezing.  albuterol (PROVENTIL HFA, VENTOLIN HFA, PROAIR HFA) 90 mcg/actuation inhaler Take 2 Puffs by inhalation every four (4) hours as needed for Wheezing.  azithromycin (ZITHROMAX) 250 mg tablet Take 1 Tab by mouth every Monday, Wednesday, Friday. Monday-Friday.  omeprazole (PRILOSEC) 40 mg capsule Take  by mouth.  insulin glargine (Basaglar KwikPen U-100 Insulin) 100 unit/mL (3 mL) inpn 45 Units by SubCUTAneous route nightly.  furosemide (Lasix) 20 mg tablet Take 20 mg by mouth daily.  lactobacillus sp. 50 billion cpu (BIO-K PLUS) 50 billion cell -375 mg cap capsule Take 1 Cap by mouth daily.  simethicone (GAS-X) 125 mg capsule Take 125 mg by mouth four (4) times daily as needed for Flatulence.  loratadine (CLARITIN) 10 mg tablet Take 10 mg by mouth daily as needed for Allergies.  lisinopril (PRINIVIL, ZESTRIL) 20 mg tablet Take 20 mg by mouth two (2) times a day.     fluticasone propion-salmeterol (ADVAIR HFA) 230-21 mcg/actuation inhaler Take 2 Puffs by inhalation two (2) times a day.  hydroCHLOROthiazide (HYDRODIURIL) 12.5 mg tablet Take 12.5 mg by mouth daily.  tiotropium bromide (SPIRIVA RESPIMAT) 2.5 mcg/actuation inhaler Take 2 Puffs by inhalation daily.  NOVOLOG FLEXPEN U-100 INSULIN 100 unit/mL inpn Continue home Sliding scale insulin as prior to admission (Patient taking differently: 1 Units by SubCUTAneous route three (3) times daily. If BG <100=0u  101-150=5u  151-250=8u  251-300=12u  >300 call MD  )    OXYGEN-AIR DELIVERY SYSTEMS 2 L by Nasal route continuous. [facility] Choice    aspirin delayed-release 81 mg tablet Take 81 mg by mouth daily.  montelukast (SINGULAIR) 10 mg tablet Take 10 mg by mouth nightly. Disposition:  home    DISCHARGE NOTE:   1:52 PM    Pt has been reexamined. Patient has no new complaints, changes, or physical findings. Care plan outlined and precautions discussed. Results of labs, CT were reviewed with the patient. All medications were reviewed with the patient. All of pt's questions and concerns were addressed. Patient was instructed and agrees to follow up with surgery, as well as to return to the ED upon further deterioration. Patient is ready to go home. Follow-up Information     Follow up With Specialties Details Why Contact Omar Juárez, [address] [place] [address] Cammie Engle DO General Surgery Schedule an appointment as soon as possible for a visit in 1 day  [address] [address] [id number]  [phone]      [facility] DEPT Emergency Medicine  If symptoms worsen return immediately [address] [facility] [address]  [phone]          Current Discharge Medication List          Diagnosis     Clinical Impression:   1. Periumbilical hernia    2.  Periumbilical abdominal pain

## 2020-07-30 NOTE — ED TRIAGE NOTES
Pt presents to ER with a chief complaint of hernia pain.  Pt states she has had this hernia for a while however it is increasing in pain

## 2020-07-31 ENCOUNTER — PATIENT OUTREACH (OUTPATIENT)
Dept: CASE MANAGEMENT | Age: 60
End: 2020-07-31

## 2020-07-31 PROCEDURE — 3331090002 HH PPS REVENUE DEBIT

## 2020-07-31 PROCEDURE — 3331090001 HH PPS REVENUE CREDIT

## 2020-07-31 NOTE — PROGRESS NOTES
Patient contacted regarding COVID-19 risk and screening. Discussed COVID-19 related testing which was available at this time. Test results were positive. Patient informed of results, if available? yes     Care Transition Nurse/ Ambulatory Care Manager contacted the patient by telephone to perform follow-up assessment. Verified name and  with patient as identifiers. Patient has following risk factors of: COPD and asthma. Symptoms reviewed with patient who verbalized the following symptoms: patient c/o pain from hernia. Denies any other sx. Patient has f/u with Dr. Mercie Severs on 20   Due to no new or worsening symptoms encounter was not routed to provider for escalation. Education provided regarding infection prevention, and signs and symptoms of COVID-19 and when to seek medical attention with patient who verbalized understanding. Discussed exposure protocols and quarantine from [address] you at higher risk for severe illness  and given an opportunity for questions and concerns. The patient agrees to contact the COVID-19 hotline [phone] or PCP office for questions related to their healthcare. CTN/ACM provided contact information for future reference. From CDC: Are you at higher risk for severe illness?  Wash your hands often.  Avoid close contact (6 feet, which is about two arm lengths) with people who are sick.  Put distance between yourself and other people if COVID-19 is spreading in your community.  Clean and disinfect frequently touched surfaces.  Avoid all cruise travel and non-essential air travel.  Call your healthcare professional if you have concerns about COVID-19 and your underlying condition or if you are sick. For more information on steps you can take to protect yourself, see CDC's How to [place] for follow-up call in 5-7 days based on severity of symptoms and risk factors.

## 2020-08-01 ENCOUNTER — HOME CARE VISIT (OUTPATIENT)
Dept: SCHEDULING | Facility: HOME HEALTH | Age: 60
End: 2020-08-01
Payer: MEDICARE

## 2020-08-01 PROCEDURE — 3331090002 HH PPS REVENUE DEBIT

## 2020-08-01 PROCEDURE — G0299 HHS/HOSPICE OF RN EA 15 MIN: HCPCS

## 2020-08-01 PROCEDURE — 3331090001 HH PPS REVENUE CREDIT

## 2020-08-02 VITALS
DIASTOLIC BLOOD PRESSURE: 68 MMHG | OXYGEN SATURATION: 96 % | TEMPERATURE: 99.3 F | HEART RATE: 84 BPM | SYSTOLIC BLOOD PRESSURE: 110 MMHG

## 2020-08-02 PROCEDURE — 3331090001 HH PPS REVENUE CREDIT

## 2020-08-02 PROCEDURE — 3331090002 HH PPS REVENUE DEBIT

## 2020-08-02 NOTE — PROGRESS NOTES
Therapy Functional Score Assessment  Question   Score   Grooming  2       Upper Dressing 2      Lower Dressing 2      Bathing  5      Toilet Transfer  2    Transfer  1      [id number] Mobility  Lying to sitting on side of bed  SOC 5  Goal    6          Ambulation  2   Dyspnea                     4       Pain Interfering with activity 3  Est number therapy visits      1

## 2020-08-03 VITALS
TEMPERATURE: 97.6 F | DIASTOLIC BLOOD PRESSURE: 76 MMHG | OXYGEN SATURATION: 97 % | RESPIRATION RATE: 20 BRPM | SYSTOLIC BLOOD PRESSURE: 148 MMHG | HEART RATE: 86 BPM

## 2020-08-03 PROCEDURE — 3331090002 HH PPS REVENUE DEBIT

## 2020-08-03 PROCEDURE — 3331090001 HH PPS REVENUE CREDIT

## 2020-08-04 PROCEDURE — 3331090002 HH PPS REVENUE DEBIT

## 2020-08-04 PROCEDURE — 3331090001 HH PPS REVENUE CREDIT

## 2020-08-05 PROCEDURE — 3331090002 HH PPS REVENUE DEBIT

## 2020-08-05 PROCEDURE — 3331090001 HH PPS REVENUE CREDIT

## 2020-08-06 ENCOUNTER — HOME CARE VISIT (OUTPATIENT)
Dept: SCHEDULING | Facility: HOME HEALTH | Age: 60
End: 2020-08-06
Payer: MEDICARE

## 2020-08-06 PROCEDURE — 3331090001 HH PPS REVENUE CREDIT

## 2020-08-06 PROCEDURE — 3331090002 HH PPS REVENUE DEBIT

## 2020-08-06 PROCEDURE — G0152 HHCP-SERV OF OT,EA 15 MIN: HCPCS

## 2020-08-07 ENCOUNTER — HOSPITAL ENCOUNTER (INPATIENT)
Age: 60
LOS: 5 days | Discharge: HOME HEALTH | DRG: 190 | End: 2020-08-12
Attending: EMERGENCY MEDICINE | Admitting: FAMILY MEDICINE
Payer: MEDICARE

## 2020-08-07 ENCOUNTER — APPOINTMENT (OUTPATIENT)
Dept: GENERAL RADIOLOGY | Age: 60
DRG: 190 | End: 2020-08-07
Attending: NURSE PRACTITIONER
Payer: MEDICARE

## 2020-08-07 ENCOUNTER — HOME CARE VISIT (OUTPATIENT)
Dept: SCHEDULING | Facility: HOME HEALTH | Age: 60
End: 2020-08-07
Payer: MEDICARE

## 2020-08-07 VITALS
RESPIRATION RATE: 16 BRPM | DIASTOLIC BLOOD PRESSURE: 65 MMHG | TEMPERATURE: 97 F | OXYGEN SATURATION: 97 % | SYSTOLIC BLOOD PRESSURE: 135 MMHG | HEART RATE: 76 BPM

## 2020-08-07 VITALS
TEMPERATURE: 97.6 F | OXYGEN SATURATION: 96 % | HEART RATE: 88 BPM | SYSTOLIC BLOOD PRESSURE: 138 MMHG | RESPIRATION RATE: 20 BRPM | DIASTOLIC BLOOD PRESSURE: 76 MMHG

## 2020-08-07 DIAGNOSIS — J44.1 COPD EXACERBATION (HCC): Primary | ICD-10-CM

## 2020-08-07 DIAGNOSIS — E87.20 LACTIC ACIDOSIS: ICD-10-CM

## 2020-08-07 PROBLEM — J12.82 PNEUMONIA DUE TO COVID-19 VIRUS: Status: ACTIVE | Noted: 2020-08-07

## 2020-08-07 PROBLEM — U07.1 PNEUMONIA DUE TO COVID-19 VIRUS: Status: ACTIVE | Noted: 2020-08-07

## 2020-08-07 LAB
ALBUMIN SERPL-MCNC: 3.8 G/DL (ref 3.4–5)
ALBUMIN/GLOB SERPL: 1 {RATIO} (ref 0.8–1.7)
ALP SERPL-CCNC: 66 U/L (ref 45–117)
ALT SERPL-CCNC: 52 U/L (ref 13–56)
ANION GAP SERPL CALC-SCNC: 8 MMOL/L (ref 3–18)
APPEARANCE UR: CLEAR
AST SERPL-CCNC: 17 U/L (ref 10–38)
BASOPHILS # BLD: 0 K/UL (ref 0–0.1)
BASOPHILS NFR BLD: 1 % (ref 0–2)
BILIRUB SERPL-MCNC: 0.7 MG/DL (ref 0.2–1)
BILIRUB UR QL: NEGATIVE
BNP SERPL-MCNC: 45 PG/ML (ref 0–900)
BUN SERPL-MCNC: 23 MG/DL (ref 7–18)
BUN/CREAT SERPL: 27 (ref 12–20)
CALCIUM SERPL-MCNC: 10.5 MG/DL (ref 8.5–10.1)
CHLORIDE SERPL-SCNC: 102 MMOL/L (ref 100–111)
CK MB CFR SERPL CALC: NORMAL % (ref 0–4)
CK MB SERPL-MCNC: <1 NG/ML (ref 5–25)
CK SERPL-CCNC: 31 U/L (ref 26–192)
CO2 SERPL-SCNC: 31 MMOL/L (ref 21–32)
COLOR UR: YELLOW
CREAT SERPL-MCNC: 0.84 MG/DL (ref 0.6–1.3)
DIFFERENTIAL METHOD BLD: ABNORMAL
EOSINOPHIL # BLD: 0.1 K/UL (ref 0–0.4)
EOSINOPHIL NFR BLD: 1 % (ref 0–5)
ERYTHROCYTE [DISTWIDTH] IN BLOOD BY AUTOMATED COUNT: 14.4 % (ref 11.6–14.5)
GLOBULIN SER CALC-MCNC: 3.9 G/DL (ref 2–4)
GLUCOSE BLD STRIP.AUTO-MCNC: 319 MG/DL (ref 70–110)
GLUCOSE SERPL-MCNC: 156 MG/DL (ref 74–99)
GLUCOSE UR STRIP.AUTO-MCNC: NEGATIVE MG/DL
HCT VFR BLD AUTO: 40.9 % (ref 35–45)
HGB BLD-MCNC: 13.8 G/DL (ref 12–16)
HGB UR QL STRIP: NEGATIVE
KETONES UR QL STRIP.AUTO: NEGATIVE MG/DL
LACTATE BLD-SCNC: 2.32 MMOL/L (ref 0.4–2)
LACTATE BLD-SCNC: 2.83 MMOL/L (ref 0.4–2)
LEUKOCYTE ESTERASE UR QL STRIP.AUTO: NEGATIVE
LYMPHOCYTES # BLD: 2.4 K/UL (ref 0.9–3.6)
LYMPHOCYTES NFR BLD: 27 % (ref 21–52)
MAGNESIUM SERPL-MCNC: 1.6 MG/DL (ref 1.6–2.6)
MCH RBC QN AUTO: 31.1 PG (ref 24–34)
MCHC RBC AUTO-ENTMCNC: 33.7 G/DL (ref 31–37)
MCV RBC AUTO: 92.1 FL (ref 74–97)
MONOCYTES # BLD: 0.9 K/UL (ref 0.05–1.2)
MONOCYTES NFR BLD: 11 % (ref 3–10)
NEUTS SEG # BLD: 5.4 K/UL (ref 1.8–8)
NEUTS SEG NFR BLD: 60 % (ref 40–73)
NITRITE UR QL STRIP.AUTO: NEGATIVE
PH UR STRIP: 5 [PH] (ref 5–8)
PLATELET # BLD AUTO: 253 K/UL (ref 135–420)
PMV BLD AUTO: 9 FL (ref 9.2–11.8)
POTASSIUM SERPL-SCNC: 3.7 MMOL/L (ref 3.5–5.5)
PROT SERPL-MCNC: 7.7 G/DL (ref 6.4–8.2)
PROT UR STRIP-MCNC: NEGATIVE MG/DL
RBC # BLD AUTO: 4.44 M/UL (ref 4.2–5.3)
SODIUM SERPL-SCNC: 141 MMOL/L (ref 136–145)
SP GR UR REFRACTOMETRY: 1.01 (ref 1–1.03)
TROPONIN I SERPL-MCNC: <0.02 NG/ML (ref 0–0.04)
UROBILINOGEN UR QL STRIP.AUTO: 0.2 EU/DL (ref 0.2–1)
WBC # BLD AUTO: 8.9 K/UL (ref 4.6–13.2)

## 2020-08-07 PROCEDURE — 80053 COMPREHEN METABOLIC PANEL: CPT

## 2020-08-07 PROCEDURE — 96376 TX/PRO/DX INJ SAME DRUG ADON: CPT

## 2020-08-07 PROCEDURE — 94640 AIRWAY INHALATION TREATMENT: CPT

## 2020-08-07 PROCEDURE — 85025 COMPLETE CBC W/AUTO DIFF WBC: CPT

## 2020-08-07 PROCEDURE — 3331090002 HH PPS REVENUE DEBIT

## 2020-08-07 PROCEDURE — 96361 HYDRATE IV INFUSION ADD-ON: CPT

## 2020-08-07 PROCEDURE — 83735 ASSAY OF MAGNESIUM: CPT

## 2020-08-07 PROCEDURE — 74011250636 HC RX REV CODE- 250/636: Performed by: NURSE PRACTITIONER

## 2020-08-07 PROCEDURE — 3331090001 HH PPS REVENUE CREDIT

## 2020-08-07 PROCEDURE — 83605 ASSAY OF LACTIC ACID: CPT

## 2020-08-07 PROCEDURE — 74011000250 HC RX REV CODE- 250: Performed by: STUDENT IN AN ORGANIZED HEALTH CARE EDUCATION/TRAINING PROGRAM

## 2020-08-07 PROCEDURE — 87040 BLOOD CULTURE FOR BACTERIA: CPT

## 2020-08-07 PROCEDURE — 74011250637 HC RX REV CODE- 250/637: Performed by: STUDENT IN AN ORGANIZED HEALTH CARE EDUCATION/TRAINING PROGRAM

## 2020-08-07 PROCEDURE — G0299 HHS/HOSPICE OF RN EA 15 MIN: HCPCS

## 2020-08-07 PROCEDURE — 93005 ELECTROCARDIOGRAM TRACING: CPT

## 2020-08-07 PROCEDURE — 65660000004 HC RM CVT STEPDOWN

## 2020-08-07 PROCEDURE — 74011250636 HC RX REV CODE- 250/636: Performed by: STUDENT IN AN ORGANIZED HEALTH CARE EDUCATION/TRAINING PROGRAM

## 2020-08-07 PROCEDURE — 81003 URINALYSIS AUTO W/O SCOPE: CPT

## 2020-08-07 PROCEDURE — 96374 THER/PROPH/DIAG INJ IV PUSH: CPT

## 2020-08-07 PROCEDURE — 82550 ASSAY OF CK (CPK): CPT

## 2020-08-07 PROCEDURE — 82962 GLUCOSE BLOOD TEST: CPT

## 2020-08-07 PROCEDURE — 87635 SARS-COV-2 COVID-19 AMP PRB: CPT

## 2020-08-07 PROCEDURE — 74011000250 HC RX REV CODE- 250: Performed by: NURSE PRACTITIONER

## 2020-08-07 PROCEDURE — 71045 X-RAY EXAM CHEST 1 VIEW: CPT

## 2020-08-07 PROCEDURE — 74011000258 HC RX REV CODE- 258: Performed by: STUDENT IN AN ORGANIZED HEALTH CARE EDUCATION/TRAINING PROGRAM

## 2020-08-07 PROCEDURE — 83880 ASSAY OF NATRIURETIC PEPTIDE: CPT

## 2020-08-07 PROCEDURE — 99285 EMERGENCY DEPT VISIT HI MDM: CPT

## 2020-08-07 PROCEDURE — 74011636637 HC RX REV CODE- 636/637: Performed by: STUDENT IN AN ORGANIZED HEALTH CARE EDUCATION/TRAINING PROGRAM

## 2020-08-07 RX ORDER — LEVOFLOXACIN 5 MG/ML
750 INJECTION, SOLUTION INTRAVENOUS
Status: COMPLETED | OUTPATIENT
Start: 2020-08-07 | End: 2020-08-07

## 2020-08-07 RX ORDER — MONTELUKAST SODIUM 10 MG/1
10 TABLET ORAL
Status: DISCONTINUED | OUTPATIENT
Start: 2020-08-07 | End: 2020-08-12 | Stop reason: HOSPADM

## 2020-08-07 RX ORDER — POLYETHYLENE GLYCOL 3350 17 G/17G
17 POWDER, FOR SOLUTION ORAL DAILY PRN
Status: DISCONTINUED | OUTPATIENT
Start: 2020-08-07 | End: 2020-08-12 | Stop reason: HOSPADM

## 2020-08-07 RX ORDER — PANTOPRAZOLE SODIUM 20 MG/1
20 TABLET, DELAYED RELEASE ORAL
Status: DISCONTINUED | OUTPATIENT
Start: 2020-08-08 | End: 2020-08-07

## 2020-08-07 RX ORDER — MAGNESIUM SULFATE 100 %
4 CRYSTALS MISCELLANEOUS AS NEEDED
Status: DISCONTINUED | OUTPATIENT
Start: 2020-08-07 | End: 2020-08-12 | Stop reason: HOSPADM

## 2020-08-07 RX ORDER — ENOXAPARIN SODIUM 100 MG/ML
40 INJECTION SUBCUTANEOUS DAILY
Status: DISCONTINUED | OUTPATIENT
Start: 2020-08-08 | End: 2020-08-09

## 2020-08-07 RX ORDER — INSULIN GLARGINE 100 [IU]/ML
45 INJECTION, SOLUTION SUBCUTANEOUS
Status: DISCONTINUED | OUTPATIENT
Start: 2020-08-07 | End: 2020-08-09

## 2020-08-07 RX ORDER — PROMETHAZINE HYDROCHLORIDE 25 MG/1
12.5 TABLET ORAL
Status: DISCONTINUED | OUTPATIENT
Start: 2020-08-07 | End: 2020-08-12 | Stop reason: HOSPADM

## 2020-08-07 RX ORDER — FLUTICASONE PROPIONATE 110 UG/1
2 AEROSOL, METERED RESPIRATORY (INHALATION)
Status: DISCONTINUED | OUTPATIENT
Start: 2020-08-08 | End: 2020-08-12 | Stop reason: HOSPADM

## 2020-08-07 RX ORDER — IPRATROPIUM BROMIDE AND ALBUTEROL SULFATE 2.5; .5 MG/3ML; MG/3ML
3 SOLUTION RESPIRATORY (INHALATION)
Status: DISCONTINUED | OUTPATIENT
Start: 2020-08-07 | End: 2020-08-10

## 2020-08-07 RX ORDER — LISINOPRIL 10 MG/1
20 TABLET ORAL 2 TIMES DAILY
Status: DISCONTINUED | OUTPATIENT
Start: 2020-08-07 | End: 2020-08-11

## 2020-08-07 RX ORDER — LORATADINE 10 MG/1
10 TABLET ORAL
Status: DISCONTINUED | OUTPATIENT
Start: 2020-08-07 | End: 2020-08-12 | Stop reason: HOSPADM

## 2020-08-07 RX ORDER — ASPIRIN 81 MG/1
81 TABLET ORAL DAILY
Status: DISCONTINUED | OUTPATIENT
Start: 2020-08-08 | End: 2020-08-12 | Stop reason: HOSPADM

## 2020-08-07 RX ORDER — PANTOPRAZOLE SODIUM 40 MG/1
40 TABLET, DELAYED RELEASE ORAL
Status: DISCONTINUED | OUTPATIENT
Start: 2020-08-08 | End: 2020-08-12 | Stop reason: HOSPADM

## 2020-08-07 RX ORDER — ACETAMINOPHEN 650 MG/1
650 SUPPOSITORY RECTAL
Status: DISCONTINUED | OUTPATIENT
Start: 2020-08-07 | End: 2020-08-12 | Stop reason: HOSPADM

## 2020-08-07 RX ORDER — IPRATROPIUM BROMIDE AND ALBUTEROL SULFATE 2.5; .5 MG/3ML; MG/3ML
3 SOLUTION RESPIRATORY (INHALATION)
Status: COMPLETED | OUTPATIENT
Start: 2020-08-07 | End: 2020-08-07

## 2020-08-07 RX ORDER — ACETAMINOPHEN 325 MG/1
650 TABLET ORAL
Status: DISCONTINUED | OUTPATIENT
Start: 2020-08-07 | End: 2020-08-12 | Stop reason: HOSPADM

## 2020-08-07 RX ORDER — DEXTROSE 50 % IN WATER (D50W) INTRAVENOUS SYRINGE
25-50 AS NEEDED
Status: DISCONTINUED | OUTPATIENT
Start: 2020-08-07 | End: 2020-08-11 | Stop reason: CLARIF

## 2020-08-07 RX ORDER — IPRATROPIUM BROMIDE AND ALBUTEROL SULFATE 2.5; .5 MG/3ML; MG/3ML
3 SOLUTION RESPIRATORY (INHALATION)
Status: DISCONTINUED | OUTPATIENT
Start: 2020-08-07 | End: 2020-08-07

## 2020-08-07 RX ORDER — HYDROCHLOROTHIAZIDE 25 MG/1
12.5 TABLET ORAL DAILY
Status: DISCONTINUED | OUTPATIENT
Start: 2020-08-08 | End: 2020-08-12 | Stop reason: HOSPADM

## 2020-08-07 RX ORDER — INSULIN LISPRO 100 [IU]/ML
INJECTION, SOLUTION INTRAVENOUS; SUBCUTANEOUS
Status: DISCONTINUED | OUTPATIENT
Start: 2020-08-08 | End: 2020-08-08

## 2020-08-07 RX ORDER — SODIUM CHLORIDE 0.9 % (FLUSH) 0.9 %
5-40 SYRINGE (ML) INJECTION AS NEEDED
Status: DISCONTINUED | OUTPATIENT
Start: 2020-08-07 | End: 2020-08-12 | Stop reason: HOSPADM

## 2020-08-07 RX ORDER — ONDANSETRON 2 MG/ML
4 INJECTION INTRAMUSCULAR; INTRAVENOUS
Status: DISCONTINUED | OUTPATIENT
Start: 2020-08-07 | End: 2020-08-12 | Stop reason: HOSPADM

## 2020-08-07 RX ORDER — FUROSEMIDE 20 MG/1
20 TABLET ORAL DAILY
Status: DISCONTINUED | OUTPATIENT
Start: 2020-08-08 | End: 2020-08-12 | Stop reason: HOSPADM

## 2020-08-07 RX ORDER — SODIUM CHLORIDE 0.9 % (FLUSH) 0.9 %
5-40 SYRINGE (ML) INJECTION EVERY 8 HOURS
Status: DISCONTINUED | OUTPATIENT
Start: 2020-08-07 | End: 2020-08-10

## 2020-08-07 RX ORDER — ACETAMINOPHEN 500 MG
500 TABLET ORAL
Status: DISCONTINUED | OUTPATIENT
Start: 2020-08-07 | End: 2020-08-12 | Stop reason: HOSPADM

## 2020-08-07 RX ORDER — AZITHROMYCIN 250 MG/1
250 TABLET, FILM COATED ORAL
Status: DISCONTINUED | OUTPATIENT
Start: 2020-08-07 | End: 2020-08-08

## 2020-08-07 RX ADMIN — LEVOFLOXACIN 750 MG: 5 INJECTION, SOLUTION INTRAVENOUS at 18:40

## 2020-08-07 RX ADMIN — IPRATROPIUM BROMIDE AND ALBUTEROL SULFATE 3 ML: .5; 3 SOLUTION RESPIRATORY (INHALATION) at 17:01

## 2020-08-07 RX ADMIN — PIPERACILLIN SODIUM AND TAZOBACTAM SODIUM 4.5 G: 4; .5 INJECTION, POWDER, LYOPHILIZED, FOR SOLUTION INTRAVENOUS at 20:56

## 2020-08-07 RX ADMIN — METHYLPREDNISOLONE SODIUM SUCCINATE 40 MG: 40 INJECTION, POWDER, FOR SOLUTION INTRAMUSCULAR; INTRAVENOUS at 17:00

## 2020-08-07 RX ADMIN — AZITHROMYCIN 250 MG: 250 TABLET, FILM COATED ORAL at 21:00

## 2020-08-07 RX ADMIN — IPRATROPIUM BROMIDE AND ALBUTEROL SULFATE 3 ML: .5; 3 SOLUTION RESPIRATORY (INHALATION) at 18:06

## 2020-08-07 RX ADMIN — IPRATROPIUM BROMIDE AND ALBUTEROL SULFATE 3 ML: .5; 3 SOLUTION RESPIRATORY (INHALATION) at 21:07

## 2020-08-07 RX ADMIN — METHYLPREDNISOLONE SODIUM SUCCINATE 20 MG: 40 INJECTION, POWDER, FOR SOLUTION INTRAMUSCULAR; INTRAVENOUS at 18:06

## 2020-08-07 RX ADMIN — IPRATROPIUM BROMIDE AND ALBUTEROL SULFATE 3 ML: .5; 3 SOLUTION RESPIRATORY (INHALATION) at 17:00

## 2020-08-07 RX ADMIN — SODIUM CHLORIDE 1000 ML: 900 INJECTION, SOLUTION INTRAVENOUS at 17:01

## 2020-08-07 RX ADMIN — SODIUM CHLORIDE 1000 ML: 900 INJECTION, SOLUTION INTRAVENOUS at 17:56

## 2020-08-07 RX ADMIN — INSULIN GLARGINE 45 UNITS: 100 INJECTION, SOLUTION SUBCUTANEOUS at 22:00

## 2020-08-07 RX ADMIN — MONTELUKAST 10 MG: 10 TABLET, FILM COATED ORAL at 22:00

## 2020-08-07 RX ADMIN — Medication 10 ML: at 22:00

## 2020-08-07 NOTE — ED NOTES
Patient tested positive for COVID. Patient has COPD and uses 3 L of oxygen at home. Patient reports she has been feeling increasingly more short of breath for the past 3 days been having chest pain. Patient is currently on Levaquin and prednisone for symptoms as prescribed by her PCP yesterday. I performed a brief evaluation, including history and physical, of the patient here in triage and I have determined that pt will need further treatment and evaluation from the main side ER physician. I have placed initial orders to help in expediting patients care.      August 07, 2020 at 2:29 PM - Mila Funk, NP        Visit Vitals  /82 (BP 1 Location: Left arm, BP Patient Position: At rest;Sitting)   Pulse 96   Temp 97.8 °F (36.6 °C)   Resp 14   Ht 5' 3\" (1.6 m)   Wt 121.1 kg (267 lb)   SpO2 92%   BMI 47.30 kg/m²

## 2020-08-07 NOTE — H&P
Admission History and Physical  [facility]      Patient: Fuentes Luu MRN: [id number]  CSN: [id number]    YOB: 1960  Age: 60 y.o. Sex: female      Admission Date: 8/7/2020       HPI:     Fuentes Luu is a 60 y.o. female with PMH COPD on home O2 (3L NC), T2DM, HTN, HFpEF, SHERRIE, now presenting with complaint of worsening dyspnea in the setting of recent covid infection. Pt seen at bedside at [facility] ED. Pt complained of a 3 day history of worsening dyspnea and pleuritic chest pain. She described her dyspnea as severe to the point that she feels as though she is \"drowning. \" Her SOB has made it difficult for her to sleep at night. She also endorsed a 10-day history of cough productive of green sputum, headache and intermittent sweating. She was seen by Dr. Abida Sears yesterday via telehealth and was started on Levoquin 500 mg for 1 week and her PO prednisone was increased to 40 mg. She was recently admitted and discharged from [facility] for COVID 19 on 7/27/2020. She is compliant to her home meds which include albuterol, advair, duo nebs, and flovent. She denies sinus tenderness, N/V, diarrhea, myalgias, or fever. Denies chest pain, palpitations or LE edema. Prior smoking history- quit 13 years ago. ED Course (See objective for values/interpretations):  Labs obtained: Blood culture, lactic acid, CBC/BMP, magnesium, pro-BNP, cardiac panel, UA, covid testing  Medications administered:  IV levoquin 750 mg once, Solumedrol 20 mg once, duoneb x3, 1L of NS  Imaging obtained: CXR, EKG    Review of Systems:  General ROS: negative for chills, fever, night sweats, weight gain and weight loss  Psychological ROS: negative for anxiety and depression  Ophthalmic ROS: negative for blurry vision, decreased vision or loss of vision  ENT ROS: negative for hearing change or visual changes. Positive for headache.    Hematological and Lymphatic ROS: negative for bruising, jaundice  Respiratory ROS: negative for hemoptysis, orthopnea, paroxysmal dyspnea, or wheezing. Positive for cough and SOB  Cardiovascular ROS: negative for LE edema, loss of consciousness, or palpitations   Gastrointestinal ROS: negative for abdominal pain, blood in stools, change in stools, constipation, diarrhea, hematemesis, melena, nausea/vomiting or swallowing difficulty/pain  Genito-Urinary ROS: negative for dysuria, hematuria or urinary frequency/urgency  Musculoskeletal ROS: negative for joint pain, joint swelling or muscle pain  Neurological ROS: negative for dizziness,  numbness/tingling or weakness. Positive for headaches.   Dermatological ROS: negative for rash or skin lesion changes    Past Medical History:   Diagnosis Date    Asthma     Chronic lung disease     COPD     Cystocele, midline     Diabetes mellitus ([facility] [address]     GERD (gastroesophageal reflux disease)     Hidradenitis suppurativa     Hyperlipidemia     Hypertension     SHERRIE on CPAP     CPAP    Stress incontinence        Past Surgical History:   Procedure Laterality Date    BREAST SURGERY PROCEDURE UNLISTED      Right breast benign tumor removal    HX APPENDECTOMY      HX CHOLECYSTECTOMY      HX DILATION AND CURETTAGE      Dysfunctional uterine bleeding, thought 2/2 fibroids    HX TUBAL LIGATION         Family History   Problem Relation Age of Onset    Hypertension Mother     Stroke Mother     Breast Cancer Mother         Bilateral mastectomies    Cancer Mother         ovarian and breast    Heart Failure Mother     Heart Attack Father         2011    Heart Surgery Father         CABG    Heart Failure Father     COPD Sister         Heavy smoker    Cancer Sister         ovarian    Heart Failure Sister     Lung Disease Sister     Asthma Child     Cancer Maternal Aunt         pancreatic    Cancer Maternal Grandfather         stomach       Social History     Socioeconomic History    Marital status: LEGALLY  Spouse name: Not on file    Number of children: Not on file    Years of education: Not on file    Highest education level: Not on file   Tobacco Use    Smoking status: Former Smoker     Packs/day: 1.00     Years: 30.00     Pack years: 30.00     Types: Cigarettes     Start date: 1966     Last attempt to quit: 2006     Years since quittin.9    Smokeless tobacco: Former User    Tobacco comment: 1-1.5 packs per day   Substance and Sexual Activity    Alcohol use: No    Drug use: No    Sexual activity: Not Currently       Allergies   Allergen Reactions    Ancef [Cefazolin] Hives    Contrast Agent [Iodine] Anaphylaxis, Shortness of Breath and Swelling     Needs pre-medication for IV contrast with Benadryl, Solu-Medrol    Fish Containing Products Anaphylaxis     Pt states she had a severe allergic reaction at 9 y/o.  Statins-Hmg-Coa Reductase Inhibitors Myalgia    Metformin Other (comments)     Abdominal pain, diarrhea.  Codeine Other (comments)     Altered mental status       Prior to Admission Medications   Prescriptions Last Dose Informant Patient Reported? Taking? NOVOLOG FLEXPEN U-100 INSULIN 100 unit/mL inpn 2020 at Unknown time Self No Yes   Sig: Continue home Sliding scale insulin as prior to admission   Patient taking differently: 1 Units by SubCUTAneous route three (3) times daily. If BG <100=0u  101-150=5u  151-250=8u  251-300=12u  >300 call MD     OXYGEN-AIR DELIVERY SYSTEMS 2020 at Unknown time  Yes Yes   Si L by Nasal route continuous. First Choice   acetaminophen (TYLENOL) 500 mg tablet 2020 at Unknown time  Yes Yes   Sig: Take 500 mg by mouth every six (6) hours as needed for Fever or Pain. albuterol (PROVENTIL HFA, VENTOLIN HFA, PROAIR HFA) 90 mcg/actuation inhaler 2020 at Unknown time  No Yes   Sig: Take 2 Puffs by inhalation every four (4) hours as needed for Wheezing.    albuterol-ipratropium (DUO-NEB) 2.5 mg-0.5 mg/3 ml nebu 2020 at Unknown time No Yes   Sig: 3 mL by Nebulization route every four (4) hours as needed for Wheezing. aspirin delayed-release 81 mg tablet 2020 at Unknown time  Yes Yes   Sig: Take 81 mg by mouth daily. azithromycin (ZITHROMAX) 250 mg tablet 2020 at Unknown time  No Yes   Sig: Take 1 Tab by mouth every Monday, Wednesday, Friday. Monday-Friday. benzonatate (TESSALON) 100 mg capsule 2020 at Unknown time  No Yes   Sig: Take 1 Cap by mouth three (3) times daily as needed for Cough for up to 20 days. Indications: cough   fluticasone propion-salmeterol (ADVAIR HFA) 230-21 mcg/actuation inhaler 2020 at Unknown time  Yes Yes   Sig: Take 2 Puffs by inhalation two (2) times a day. fluticasone propionate (Flovent HFA) 220 mcg/actuation inhaler 2020 at Unknown time  No Yes   Sig: Take 1 Puff by inhalation two (2) times a day. furosemide (Lasix) 20 mg tablet 2020 at Unknown time  Yes Yes   Sig: Take 20 mg by mouth daily. hydroCHLOROthiazide (HYDRODIURIL) 12.5 mg tablet 2020 at Unknown time  Yes Yes   Sig: Take 12.5 mg by mouth daily. insulin glargine (Basaglar KwikPen U-100 Insulin) 100 unit/mL (3 mL) inpn 2020 at Unknown time  Yes Yes   Si Units by SubCUTAneous route nightly. lactobacillus sp. 50 billion cpu (BIO-K PLUS) 50 billion cell -375 mg cap capsule 2020 at Unknown time  No Yes   Sig: Take 1 Cap by mouth daily. lisinopril (PRINIVIL, ZESTRIL) 20 mg tablet 2020 at Unknown time  Yes Yes   Sig: Take 20 mg by mouth two (2) times a day. loratadine (CLARITIN) 10 mg tablet 2020 at Unknown time  Yes Yes   Sig: Take 10 mg by mouth daily as needed for Allergies. montelukast (SINGULAIR) 10 mg tablet 2020 at Unknown time  Yes Yes   Sig: Take 10 mg by mouth nightly. omeprazole (PRILOSEC) 40 mg capsule 2020 at Unknown time  Yes Yes   Sig: Take 40 mg by mouth daily.    simethicone (GAS-X) 125 mg capsule 2020 at Unknown time  Yes Yes   Sig: Take 125 mg by mouth four (4) times daily as needed for Flatulence. tiotropium bromide (SPIRIVA RESPIMAT) 2.5 mcg/actuation inhaler 8/6/2020 at Unknown time  Yes Yes   Sig: Take 2 Puffs by inhalation daily. Facility-Administered Medications: None       Physical Exam:     Patient Vitals for the past 24 hrs:   Temp Pulse Resp BP SpO2   08/07/20 1842 -- 97 26 -- --   08/07/20 1837 -- 97 28 -- --   08/07/20 1832 -- 94 23 122/50 --   08/07/20 1827 -- 96 22 -- --   08/07/20 1822 -- 100 22 -- --   08/07/20 1812 -- 94 18 -- --   08/07/20 1807 -- 94 22 -- --   08/07/20 1802 -- 94 21 99/47 --   08/07/20 1757 -- 93 23 -- --   08/07/20 1752 -- 93 28 -- --   08/07/20 1747 -- 95 21 95/69 --   08/07/20 1742 -- 94 23 -- --   08/07/20 1737 -- 90 23 -- --   08/07/20 1732 -- 93 16 100/87 --   08/07/20 1727 -- 92 18 -- --   08/07/20 1722 -- 92 19 -- --   08/07/20 1717 -- 87 25 107/70 --   08/07/20 1712 -- 87 29 -- --   08/07/20 1707 -- 88 24 -- --   08/07/20 1702 -- 89 26 118/72 --   08/07/20 1657 -- 88 27 -- --   08/07/20 1652 -- 89 26 -- --   08/07/20 1647 -- 90 26 131/69 --   08/07/20 1642 -- 88 26 -- --   08/07/20 1637 -- 90 23 -- --   08/07/20 1632 -- 96 26 128/71 --   08/07/20 1627 -- 86 28 -- --   08/07/20 1622 -- 89 22 -- --   08/07/20 1617 -- 89 24 130/64 --   08/07/20 1612 -- 86 27 -- --   08/07/20 1607 -- 88 19 -- --   08/07/20 1602 -- 89 28 131/67 --   08/07/20 1600 98.2 °F (36.8 °C) 88 24 129/64 98 %   08/07/20 1419 97.8 °F (36.6 °C) 96 14 139/82 92 %       Physical Exam:   General:  AAOx3, NAD   HEENT: Conjunctiva pink, sclera anicteric. PERRL. EOMI. Pharynx moist, nonerythematous. Moist mucous membranes. No cervical, supraclavicular, occipital or submandibular lymphadenopathy. No other gross abnormalities present. CV:  RRR, no murmurs. No visible pulsations or thrills. RESP:  Labored breathing. Reduced air entry bilaterally. No wheezes or bibasilar crepitations. ABD:  Soft, nontender, nondistended. BS (+). No hepatosplenomegaly.   No suprapubic tenderness. .  MS:  No joint deformity or instability. No atrophy. Neuro:  CN II-XII grossly intact. 5/5 strength bilateral upper extremities and lower extremities. Ext:  No edema. 2+ radial and dp pulses bilaterally. Skin:  No rashes, lesions, or ulcers. Good turgor. Chemistry Recent Labs     08/07/20  1510   *      K 3.7      CO2 31   BUN 23*   CREA 0.84   CA 10.5*   MG 1.6   AGAP 8   BUCR 27*   AP 66   TP 7.7   ALB 3.8   GLOB 3.9   AGRAT 1.0        CBC w/Diff Recent Labs     08/07/20  1510   WBC 8.9   RBC 4.44   HGB 13.8   HCT 40.9      GRANS 60   LYMPH 27   EOS 1        Liver Enzymes Protein, total   Date Value Ref Range Status   08/07/2020 7.7 6.4 - 8.2 g/dL Final     Albumin   Date Value Ref Range Status   08/07/2020 3.8 3.4 - 5.0 g/dL Final     Globulin   Date Value Ref Range Status   08/07/2020 3.9 2.0 - 4.0 g/dL Final     A-G Ratio   Date Value Ref Range Status   08/07/2020 1.0 0.8 - 1.7   Final     Alk. phosphatase   Date Value Ref Range Status   08/07/2020 66 45 - 117 U/L Final     Recent Labs     08/07/20  1510   TP 7.7   ALB 3.8   GLOB 3.9   AGRAT 1.0   AP 66        Lactic Acid Lactic acid   Date Value Ref Range Status   07/09/2020 1.2 0.4 - 2.0 MMOL/L Final     No results for input(s): LAC in the last 72 hours. BNP No results found for: BNP, BNPP, XBNPT     Cardiac Enzymes Lab Results   Component Value Date/Time    CPK 31 08/07/2020 03:10 PM    CKMB <1.0 08/07/2020 03:10 PM    CKND1  08/07/2020 03:10 PM     CALCULATION NOT PERFORMED WHEN RESULT IS BELOW LINEAR LIMIT    Ruthe Lark <0.02 08/07/2020 03:10 PM        Coagulation No results for input(s): PTP, INR, APTT, INREXT in the last 72 hours.       Thyroid  Lab Results   Component Value Date/Time    TSH 2.76 09/18/2016 02:20 PM          Lipid Panel Lab Results   Component Value Date/Time    Cholesterol, total 220 (H) 11/20/2012 03:22 AM    HDL Cholesterol 62 (H) 11/20/2012 03:22 AM    LDL, calculated 144.6 (H) 11/20/2012 03:22 AM    VLDL, calculated 13.4 11/20/2012 03:22 AM    Triglyceride 67 11/20/2012 03:22 AM    CHOL/HDL Ratio 3.5 11/20/2012 03:22 AM        ABG No results for input(s): PHI, PHI, POC2, PCO2I, PO2, PO2I, HCO3, HCO3I, FIO2, FIO2I in the last 72 hours. Urinalysis Lab Results   Component Value Date/Time    Color YELLOW 08/07/2020 03:11 PM    Appearance CLEAR 08/07/2020 03:11 PM    Specific gravity 1.009 08/07/2020 03:11 PM    pH (UA) 5.0 08/07/2020 03:11 PM    Protein Negative 08/07/2020 03:11 PM    Glucose Negative 08/07/2020 03:11 PM    Ketone Negative 08/07/2020 03:11 PM    Bilirubin Negative 08/07/2020 03:11 PM    Urobilinogen 0.2 08/07/2020 03:11 PM    Nitrites Negative 08/07/2020 03:11 PM    Leukocyte Esterase Negative 08/07/2020 03:11 PM    Epithelial cells 2+ 07/09/2020 07:16 PM    Bacteria 1+ (A) 07/09/2020 07:16 PM    WBC 0 to 3 07/09/2020 07:16 PM    RBC Negative 05/24/2020 11:00 AM        Micro No results for input(s): SDES, CULT in the last 72 hours. No results for input(s): CULT in the last 72 hours. Imaging:  XR (Most Recent). Results from [place] encounter on 08/07/20   XR CHEST PORT    Narrative EXAM: XR CHEST PORT    INDICATION: 60 years Female. sob/covid. ADDITIONAL HISTORY: None. TECHNIQUE: Frontal view of the chest.    COMPARISON: 7/27/2020    FINDINGS:    The cardiac silhouette is within normal limits in size. There is linear opacities in the peripheral aspect of the bilateral mid and  lower lung zones which are less pronounced than prior. No definite evidence of pleural effusion. No evidence of pneumothorax. Tethering  of the left lateral hemidiaphragm. Osseous structures are unchanged in appearance. Impression IMPRESSION:  Interval improved linear peripheral opacities in the bilateral mid and lower  lung zones, suggesting of scarring although persistent infiltrates are not  excluded. Recommend continued imaging follow-up.         CT (Most Recent) Results from [place] encounter on 07/30/20   CT ABD PELV WO CONT    Addendum Addendum: Addendum:  As mentioned in the initial report but not in impression are extensive peripheral reticular and fibrotic opacities in imaged bilateral lower lung  new since the CT in 4/20 and concerning subacute interstitial pneumonitis. Clinical correlation and chest CT evaluation advised. Emma Cesar MD 7/30/2020 10:38 AM          Narrative CT of abdomen and pelvis without contrast     INDICATION: Increasing pain at the ventral hernia    COMPARISON: CT 4/8/20    TECHNIQUE: 5 mm helical scan to the abdomen and pelvis is obtained  from the  diaphragm to the symphysis pubis without  IV contrast administration. All CT scans at this facility performed using dose optimization techniques as  appreciated to a performed exam, to include automated exposure control,  adjustment of the mA and or KU according to patient size (including appropriate  matching for site specific examination), or use of iterative reconstruction  technique. FINDINGS: The study is suboptimal due to lack of IV contrast.  Subtle  abnormality can be under detected. Lung Bases: Moderate streaky and reticular opacities identified in imaged  bilateral lower lobes, new since the prior study. Atelectasis in right middle  lobe along the fissure. Prominent pericardial fat pad. Liver: Normal.    Gallbladder: Surgically absent. Biliary System: No ductal dilatation. Spleen: Normal.    Pancreas: Normal.    Adrenal Glands: Normal.    Kidneys: Normal.    Bowel: The stomach is filled with food content within no definite abnormality. The small bowel is mildly dilated with air-fluid levels as well as mild mucosal  thickening, especially in the jejunum. The dilatation is extending to the  terminal ileum with no definite transit point. The colon is nondilated with  scattered diverticula throughout. No diverticulitis.     The periumbilical hernia is slightly decreased in size from 6.7 x 7.2 cm to 5.8  x 6.3 cm. The small bowel loop within the hernia sac is no longer evident  instead is abutting the hernia. Although, the mesenteric fat within the hernia  sac appears inflamed. Mild hernia wall thickening and minimal fluid appear  unchanged. Lower genitourinary system: The bladder is poorly distended. The uterus is  surgically absent. Peritoneum/Retroperitoneum: No adenopathy. Vasculature: Moderate atherosclerotic aortic disease. Other: No free fluid. CT OSSEOUS STRUCTURES:       Unremarkable for age. Impression IMPRESSION:     1. The ventral hernia is slightly smaller and the small bowel loop is no longer  seen in the hernia sac but abutting the hernia neck compared to the prior CT in  4/20. The mesenteric within the hernia sac becomes strained, suggesting fatty  inflammation/ischemia. 2. Interval development of mild small bowel dilatation with air-fluid level and  mild mural thickening all the way to the terminal ileum with no evidence of  transit point. The finding could represent enteritis or adynamic ileus. Recommend clinical correlation. 3. Diverticulosis coli. Thank you for this referral.         ECHO No results found for this or any previous visit. EKG No results found for this or any previous visit.      Recent Results (from the past 12 hour(s))   POC LACTIC ACID    Collection Time: 08/07/20  3:06 PM   Result Value Ref Range    Lactic Acid (POC) 2.32 (HH) 0.40 - 2.00 mmol/L   CBC WITH AUTOMATED DIFF    Collection Time: 08/07/20  3:10 PM   Result Value Ref Range    WBC 8.9 4.6 - 13.2 K/uL    RBC 4.44 4.20 - 5.30 M/uL    HGB 13.8 12.0 - 16.0 g/dL    HCT 40.9 35.0 - 45.0 %    MCV 92.1 74.0 - 97.0 FL    MCH 31.1 24.0 - 34.0 PG    MCHC 33.7 31.0 - 37.0 g/dL    RDW 14.4 11.6 - 14.5 %    PLATELET 692 615 - 417 K/uL    MPV 9.0 (L) 9.2 - 11.8 FL    NEUTROPHILS 60 40 - 73 %    LYMPHOCYTES 27 21 - 52 %    MONOCYTES 11 (H) 3 - 10 % EOSINOPHILS 1 0 - 5 %    BASOPHILS 1 0 - 2 %    ABS. NEUTROPHILS 5.4 1.8 - 8.0 K/UL    ABS. LYMPHOCYTES 2.4 0.9 - 3.6 K/UL    ABS. MONOCYTES 0.9 0.05 - 1.2 K/UL    ABS. EOSINOPHILS 0.1 0.0 - 0.4 K/UL    ABS. BASOPHILS 0.0 0.0 - 0.1 K/UL    DF AUTOMATED     METABOLIC PANEL, COMPREHENSIVE    Collection Time: 08/07/20  3:10 PM   Result Value Ref Range    Sodium 141 136 - 145 mmol/L    Potassium 3.7 3.5 - 5.5 mmol/L    Chloride 102 100 - 111 mmol/L    CO2 31 21 - 32 mmol/L    Anion gap 8 3.0 - 18 mmol/L    Glucose 156 (H) 74 - 99 mg/dL    BUN 23 (H) 7.0 - 18 MG/DL    Creatinine 0.84 0.6 - 1.3 MG/DL    BUN/Creatinine ratio 27 (H) 12 - 20      GFR est AA >60 >60 ml/min/1.73m2    GFR est non-AA >60 >60 ml/min/1.73m2    Calcium 10.5 (H) 8.5 - 10.1 MG/DL    Bilirubin, total 0.7 0.2 - 1.0 MG/DL    ALT (SGPT) 52 13 - 56 U/L    AST (SGOT) 17 10 - 38 U/L    Alk.  phosphatase 66 45 - 117 U/L    Protein, total 7.7 6.4 - 8.2 g/dL    Albumin 3.8 3.4 - 5.0 g/dL    Globulin 3.9 2.0 - 4.0 g/dL    A-G Ratio 1.0 0.8 - 1.7     MAGNESIUM    Collection Time: 08/07/20  3:10 PM   Result Value Ref Range    Magnesium 1.6 1.6 - 2.6 mg/dL   NT-PRO BNP    Collection Time: 08/07/20  3:10 PM   Result Value Ref Range    NT pro-BNP 45 0 - 900 PG/ML   CARDIAC PANEL,(CK, CKMB & TROPONIN)    Collection Time: 08/07/20  3:10 PM   Result Value Ref Range    CK - MB <1.0 <3.6 ng/ml    CK-MB Index  0.0 - 4.0 %     CALCULATION NOT PERFORMED WHEN RESULT IS BELOW LINEAR LIMIT    CK 31 26 - 192 U/L    Troponin-I, QT <0.02 0.0 - 0.045 NG/ML   URINALYSIS W/ RFLX MICROSCOPIC    Collection Time: 08/07/20  3:11 PM   Result Value Ref Range    Color YELLOW      Appearance CLEAR      Specific gravity 1.009 1.005 - 1.030      pH (UA) 5.0 5.0 - 8.0      Protein Negative NEG mg/dL    Glucose Negative NEG mg/dL    Ketone Negative NEG mg/dL    Bilirubin Negative NEG      Blood Negative NEG      Urobilinogen 0.2 0.2 - 1.0 EU/dL    Nitrites Negative NEG      Leukocyte Esterase Negative NEG     EKG, 12 LEAD, INITIAL    Collection Time: 08/07/20  3:24 PM   Result Value Ref Range    Ventricular Rate 94 BPM    Atrial Rate 94 BPM    P-R Interval 130 ms    QRS Duration 78 ms    Q-T Interval 346 ms    QTC Calculation (Bezet) 432 ms    Calculated P Axis 52 degrees    Calculated R Axis 13 degrees    Calculated T Axis 59 degrees    Diagnosis       Normal sinus rhythm  Cannot rule out Anterior infarct (cited on or before 22-JUL-2020)  Abnormal ECG  When compared with ECG of 30-JUL-2020 09:34,  No significant change was found     POC LACTIC ACID    Collection Time: 08/07/20  6:19 PM   Result Value Ref Range    Lactic Acid (POC) 2.83 (HH) 0.40 - 2.00 mmol/L       Assessment/Plan:   60 y.o. female with PMH of COPD on home O2 (3L NC), IDDM2, HTN, HFpEF, SHERRIE on CPAP, GERD, and allergic rhinitis admitted today for acute on chronic hypoxic respiratory failure.      1. Acute on chronic hypoxic respiratory failure likely 2/2 Covid PNA in the setting of recent COVID positive status    DDx to include COPD exacerbation vs COVID PNA vs CHF exacerbation. Pt has a history of chronic COPD with asthma overlapping syndrome and HFpEF. She was hospitalized 5x in 2020 for COPD exacerbations. Tested positive for Covid-19 on 7/7 and 7/22. She was recently admitted and discharged from [facility] for COVID 19 on 7/27/2020 where she was started on a course of Remdesivir, given an infusion of convalescent plasma, and started on steroids. She was covid positive on discharge. Currently on 3L of O2 at home. She presents today with 3 days of worsening dyspnea, cough productive of green sputum, and headaches but no fever. She is compliant to her home meds incl. albuterol, duo nebs, Advair, Singulair, Flovent and prednisone 40 mg PO. She has a prior smoking history but quit 13 years ago. In the ED, she was on 3 L O2 with sat. 98%. She is hemodynamically stable otherwise and did not appear septic.  Lung exam was unremarkable except for reduced air entry bilaterally. Normal WCC (8.9) and  elevated lactic acid 2.3> 2.83 on admission. Troponins negative. CXR on admission showed persistent infiltrates and linear peripheral opacifications in the bilateral mid and lower lung zones suggestive of scarring. However, CXR today is improved from her last CXR on 7/27 showing pulmonary vascular congestion and infiltrates L>R. EKG NSR. Low degree of suspicion for acute CHF exacerbation given normal pro-BNP (45) and no LE edema on examination.   - Admit to COVID unit with telemetry monitoring  - Droplet plus precautions  - Start duonebs q4h, singulair 10 mg, spiriva and flovent   - Start IV Solumedrol 60mg q12h. Hold home PO prednisone 40 mg.   - Antibiotics: discontinue IV levoquin 750 mg. Start 4.5 g q6h zosyn ( for pseudomonas coverage considering hx of broad spectrum abx for 3 months and home steroid use)  - Supplemental O2 prn to maintain goal 88-92%  - Continue at home azithromycin 250 mg M, W, F   - FU sputum gram stain and culture   - FU Blood culture   - FU repeat lactic acid  - FU ABG  - Consult Pulm in AM  - Vital signs per unit routine  - Monitor I/Os  - Ambulate as tolerated  - Daily CBC, BMP  - Repeat Covid testing  - Trend covid labs (CRP, PT, PTT, ferritin, d-dimer, pro calcitonin, Mg, phos, fibrinogen)  - Lovenox 40 mg for DVT prophylaxis   - Replete electrolytes daily  - PT/OT/CM     2. Insulin dependent Type 2 Diabete:   - Glucose on admission 156. Her high dose steroids can contribute to her hyperglycemia. On home lantus 45u pm and novolog SSI. -Continue Lantus at 45 U.  -SSI   -Accuchecks   -Diabetic diet  -Diabetic educator consult     Hypertension, HFpEF:  -  ECHO (5/24/20) HT 97 - 70%. No regional wall motion abnormality. Moderate (grade 2) left ventricular diastolic dysfunction. Mildly dilated left atrium. Pulmonary arterial systolic pressure is 61 mmHg. - Troponins negative on admission. BNP low (45).   -  Pt on lisinopril 20mg BID and HCTZ 12.5mg daily at home.  - Continue Lasix 20mg daily  - Continue Lisinopril 20mg BID  - Continue HCTZ 12.5 mg daily     GERD: Currently asymptomatic   -Continue protonix 40mg daily     Morbid obesity  -Diabetic diet     SHERRIE  - BIPAP at night, continue in-patient    For additional problem list, assessment, and plan please see senior note. Diet Diabetic diet   DVT Prophylaxis Lovenox   GI Prophylaxis Omeprazole   Code status FULL   Disposition Home     Point of Contact [place]  Relationship: Daughter  [phone]      Alondra Mullen, PGY1   [facility]   [place] Pager: [phone]   August 7, 2020, 6:56 PM          Senior Addendum to History and Physical  [facility]        Patient: Nolberto Garber MRN: [id number]  CSN: [id number]    YOB: 1960  Age: 60 y.o. Sex: female       DOA: 8/7/2020       HPI:      Nolberto Garber is a 60 y.o. female with PMH COPD on home O2 (3L NC), IDDM2, HTN, HFpEF, SHERRIE on CPAP, GERD, allergic rhinitis, recent Covid infection/acute on chronic hypoxic respiratory failure, now presenting with complaint of increased dyspnea.      Patient presents with 3 days of worsening dyspnea. She is well known to our service due to frequent COPD exacerbations. Patient states she was discharged 10 days ago and was feeling better however the past three days has felt worsening dyspnea, states feeling \"like im drowning\". Dyspnea is at rest and with minimal exertion. She also reports chest tightness that she says she usually feels with her COPD exacerbations. Patient denies any other chest pain. She reports worsening productive cough of green sputum. She was seen via telehealth visit yesterday with PCP office and prescribed Levaquin and her home prednisone was increased to 40 mg daily. Denies any fevers. She does report overall fatigue and mild headache.  She denies any sinus pressure, abdominal pain, N/V, difficulty urinating or with bowel moments, or lower extremity edema. States she was supposed to see Dr. Christopher Christianson, general surgery for hernia evaluation on Tuesday however there was no power in his office. States her hernia has been giving her more trouble recently. She has been taking all her medications as prescribed specifically albuterol, duo nebs, Advair, and Flovent. She is a former smoker, quit 13 years ago.      HPI, ROS, PMH, PSH, Family Hx, Social Hx, Home medications, and allergies as above in intern H&P. Which has been reviewed in full. Additional comments to subjective history include:     Physical Exam:      Physical Exam:  General:  Obese female, moderate dyspnea   HEENT: Conjunctiva pink, sclera anicteric. PERRL. EOMI. Pharynx moist, nonerythematous. Moist mucous membranes. Thyroid not enlarged, no nodules. No cervical, supraclavicular, occipital or submandibular lymphadenopathy. No other gross abnormalities present. CV:  tachycardia, no murmurs. RESP:  Moderate dyspnea, reduced air movement bilaterally, no obvious wheeze, no rales or rhonchi,  Equal expansion bilaterally. ABD:  Soft, nontender, nondistended. No hepatosplenomegaly. No suprapubic tenderness. Umbilical hernia is soft, minimal tenderness, easily reducible   MS:  No joint deformity or instability. No atrophy. Neuro:  5/5 strength bilateral upper extremities and lower extremities. CN II-XII grossly intact. Sensation grossly intact. A+Ox3. Ext:  No edema. 2+ radial and dp pulses   Skin:  No rashes, lesions, or ulcers. Good turgor.   Psych: normal mood, affect, answers questions appropriately   Labs and imaging reviewed      Assessment/Plan:   60 y.o. female with PMH of COPD on home O2 (3L NC), IDDM2, HTN, HFpEF, SHERRIE on CPAP, GERD, allergic rhinitis, recent admission for Covid pneumonia coupled with aute on chronic hypoxic respiratory failure now with increased dyspnea.     Acute on chronic hypoxic respiratory failure likely 2/2 Covid, in the setting of chronic COPD/asthma overlapping syndrome, HFpEF  DDx can include COPD exacerbation, COVID pneumonia, fluid overload/CHF exacerbation. Pt w/ positive covid swab (7/7, 7/22). Pt with severe underlying lung disease, Severe COPD on max therapy w/ strong asthma component. Hospitalized 6x in 2020 for COPD exacerbations. Recently DCd for COVID, from the service s/p remdesivir and convalescent plasma. Felt better, but has had worsening respiratory for several days now. Patients VSS. Sating well on 3 L in the ED however does appear in mod respiratory distress. Patients labs are largely reassuring. Her lactic acid is elevated however otherwise does not appear septic. Normal white count. Troponins negative and BNP is low and at baseline. CXR today appears improved from recent discharge. She does not appear fluid overloaded, no lower extremity edema concerning for CHF exacerbation.   - Admit to COVID unit with telemetry monitoring  - Consult Pulm  - duonebs q4 hours   - Continue Advair and spiriva   - albuterol prn   - hold home bronchodilators   - Antibiotic: zosyn to cover pseudomonas, d/c levaquin to  - Continue at home azithromycin M, W, F   - COVID swab completed in ED   - Daily CBC, BMP   - repeat lactic acid   - ABG now  - Lovenox 40 mg for DVT prophylaxis   - solumedrol 60mg q12h   - Oxygen with goal of 88-92%.   - Vital signs per unit routine  - Monitor I/Os  - Ambulate as tolerated  - Replete electrolytes daily  - PT/OT/CM     Insulin dependent Type 2 Diabetes  Home lantus 45u pm and novolog SSI. -Continue Lantus at 45 U.  -SSI   -Accuchecks ACHS  -Diabetic diet  -Diabetic educator consult     Hypertension, HFpEF: ECHO (5/24/20) CL 02 - 70%. No regional wall motion abnormality. Moderate (grade 2) left ventricular diastolic dysfunction. Mildly dilated left atrium. Pulmonary hypertension. Pulmonary arterial systolic pressure is 61 mmHg.  Severely elevated central venous pressure (15+ mmHg); IVC diameter is larger than 21 mm and collapses less than 50% with respiration. SBP in ED elevated  to 129s-139s.  Pt on lisinopril 20mg BID and HCTZ 12.5mg daily at home.  - Continue Lasix 20mg daily  - Continue Lisinopril 20mg BID  - Continue HCTZ 12.5 mg daily     GERD: Currently asymptomatic   -Continue omeprazole 40mg daily     Morbid obesity  -Diabetic diet      Diet Diabetic/cardiac diet   DVT Prophylaxis Lovenox   GI Prophylaxis Omeprazole    Code status Full   Disposition >2 nights      Point of Contact Missy Lewis  Relationship: Daughter  [phone]         For additional problem list, assessment, and plan see intern note     Andrew Panye MD, PGY-3  8/7/2020, 6:17 PM

## 2020-08-08 LAB
ANION GAP SERPL CALC-SCNC: 12 MMOL/L (ref 3–18)
APTT PPP: 26 SEC (ref 23–36.4)
ARTERIAL PATENCY WRIST A: YES
ATRIAL RATE: 94 BPM
BASE DEFICIT BLD-SCNC: 2 MMOL/L
BASOPHILS # BLD: 0 K/UL (ref 0–0.1)
BASOPHILS NFR BLD: 0 % (ref 0–2)
BDY SITE: NORMAL
BODY TEMPERATURE: 36.8
BUN SERPL-MCNC: 21 MG/DL (ref 7–18)
BUN/CREAT SERPL: 19 (ref 12–20)
CALCIUM SERPL-MCNC: 9.2 MG/DL (ref 8.5–10.1)
CALCULATED P AXIS, ECG09: 52 DEGREES
CALCULATED R AXIS, ECG10: 13 DEGREES
CALCULATED T AXIS, ECG11: 59 DEGREES
CHLORIDE SERPL-SCNC: 102 MMOL/L (ref 100–111)
CO2 SERPL-SCNC: 23 MMOL/L (ref 21–32)
CREAT SERPL-MCNC: 1.08 MG/DL (ref 0.6–1.3)
CRP SERPL HS-MCNC: >9.5 MG/L
D DIMER PPP FEU-MCNC: <0.27 UG/ML(FEU)
DIAGNOSIS, 93000: NORMAL
DIFFERENTIAL METHOD BLD: ABNORMAL
EOSINOPHIL # BLD: 0 K/UL (ref 0–0.4)
EOSINOPHIL NFR BLD: 0 % (ref 0–5)
ERYTHROCYTE [DISTWIDTH] IN BLOOD BY AUTOMATED COUNT: 14.4 % (ref 11.6–14.5)
ERYTHROCYTE [SEDIMENTATION RATE] IN BLOOD: 12 MM/HR (ref 0–30)
FERRITIN SERPL-MCNC: 30 NG/ML (ref 8–388)
FIBRINOGEN PPP-MCNC: 378 MG/DL (ref 210–451)
GAS FLOW.O2 O2 DELIVERY SYS: NORMAL L/MIN
GAS FLOW.O2 SETTING OXYMISER: 3 L/M
GLUCOSE BLD STRIP.AUTO-MCNC: 166 MG/DL (ref 70–110)
GLUCOSE BLD STRIP.AUTO-MCNC: 205 MG/DL (ref 70–110)
GLUCOSE BLD STRIP.AUTO-MCNC: 260 MG/DL (ref 70–110)
GLUCOSE BLD STRIP.AUTO-MCNC: 278 MG/DL (ref 70–110)
GLUCOSE SERPL-MCNC: 231 MG/DL (ref 74–99)
HCO3 BLD-SCNC: 23.2 MMOL/L (ref 22–26)
HCT VFR BLD AUTO: 36.2 % (ref 35–45)
HGB BLD-MCNC: 11.9 G/DL (ref 12–16)
INR PPP: 1.1 (ref 0.8–1.2)
LACTATE BLD-SCNC: 4.59 MMOL/L (ref 0.4–2)
LACTATE BLD-SCNC: 4.77 MMOL/L (ref 0.4–2)
LYMPHOCYTES # BLD: 0.8 K/UL (ref 0.9–3.6)
LYMPHOCYTES NFR BLD: 7 % (ref 21–52)
MAGNESIUM SERPL-MCNC: 1.4 MG/DL (ref 1.6–2.6)
MCH RBC QN AUTO: 30.4 PG (ref 24–34)
MCHC RBC AUTO-ENTMCNC: 32.9 G/DL (ref 31–37)
MCV RBC AUTO: 92.3 FL (ref 74–97)
MONOCYTES # BLD: 0.3 K/UL (ref 0.05–1.2)
MONOCYTES NFR BLD: 3 % (ref 3–10)
NEUTS SEG # BLD: 9.6 K/UL (ref 1.8–8)
NEUTS SEG NFR BLD: 90 % (ref 40–73)
O2/TOTAL GAS SETTING VFR VENT: 32 %
P-R INTERVAL, ECG05: 130 MS
PCO2 BLD: 38.6 MMHG (ref 35–45)
PH BLD: 7.39 [PH] (ref 7.35–7.45)
PHOSPHATE SERPL-MCNC: 4 MG/DL (ref 2.5–4.9)
PLATELET # BLD AUTO: 244 K/UL (ref 135–420)
PMV BLD AUTO: 8.9 FL (ref 9.2–11.8)
PO2 BLD: 89 MMHG (ref 80–100)
POTASSIUM SERPL-SCNC: 4.4 MMOL/L (ref 3.5–5.5)
PROCALCITONIN SERPL-MCNC: <0.05 NG/ML
PROTHROMBIN TIME: 13.6 SEC (ref 11.5–15.2)
Q-T INTERVAL, ECG07: 346 MS
QRS DURATION, ECG06: 78 MS
QTC CALCULATION (BEZET), ECG08: 432 MS
RBC # BLD AUTO: 3.92 M/UL (ref 4.2–5.3)
SAO2 % BLD: 97 % (ref 92–97)
SARS-COV-2, COV2NT: NOT DETECTED
SERVICE CMNT-IMP: NORMAL
SODIUM SERPL-SCNC: 137 MMOL/L (ref 136–145)
SPECIMEN TYPE: NORMAL
TOTAL RESP. RATE, ITRR: 17
VENTRICULAR RATE, ECG03: 94 BPM
WBC # BLD AUTO: 10.7 K/UL (ref 4.6–13.2)

## 2020-08-08 PROCEDURE — 83605 ASSAY OF LACTIC ACID: CPT

## 2020-08-08 PROCEDURE — 74011636637 HC RX REV CODE- 636/637: Performed by: STUDENT IN AN ORGANIZED HEALTH CARE EDUCATION/TRAINING PROGRAM

## 2020-08-08 PROCEDURE — 74011250637 HC RX REV CODE- 250/637: Performed by: STUDENT IN AN ORGANIZED HEALTH CARE EDUCATION/TRAINING PROGRAM

## 2020-08-08 PROCEDURE — 74011250636 HC RX REV CODE- 250/636: Performed by: STUDENT IN AN ORGANIZED HEALTH CARE EDUCATION/TRAINING PROGRAM

## 2020-08-08 PROCEDURE — 74011000258 HC RX REV CODE- 258: Performed by: STUDENT IN AN ORGANIZED HEALTH CARE EDUCATION/TRAINING PROGRAM

## 2020-08-08 PROCEDURE — 3331090001 HH PPS REVENUE CREDIT

## 2020-08-08 PROCEDURE — 86141 C-REACTIVE PROTEIN HS: CPT

## 2020-08-08 PROCEDURE — 85025 COMPLETE CBC W/AUTO DIFF WBC: CPT

## 2020-08-08 PROCEDURE — 74011000250 HC RX REV CODE- 250: Performed by: STUDENT IN AN ORGANIZED HEALTH CARE EDUCATION/TRAINING PROGRAM

## 2020-08-08 PROCEDURE — 5A09457 ASSISTANCE WITH RESPIRATORY VENTILATION, 24-96 CONSECUTIVE HOURS, CONTINUOUS POSITIVE AIRWAY PRESSURE: ICD-10-PCS | Performed by: FAMILY MEDICINE

## 2020-08-08 PROCEDURE — 65660000004 HC RM CVT STEPDOWN

## 2020-08-08 PROCEDURE — 82728 ASSAY OF FERRITIN: CPT

## 2020-08-08 PROCEDURE — 74011250636 HC RX REV CODE- 250/636: Performed by: FAMILY MEDICINE

## 2020-08-08 PROCEDURE — 82962 GLUCOSE BLOOD TEST: CPT

## 2020-08-08 PROCEDURE — 85379 FIBRIN DEGRADATION QUANT: CPT

## 2020-08-08 PROCEDURE — 85384 FIBRINOGEN ACTIVITY: CPT

## 2020-08-08 PROCEDURE — 82803 BLOOD GASES ANY COMBINATION: CPT

## 2020-08-08 PROCEDURE — 85730 THROMBOPLASTIN TIME PARTIAL: CPT

## 2020-08-08 PROCEDURE — 85652 RBC SED RATE AUTOMATED: CPT

## 2020-08-08 PROCEDURE — 84100 ASSAY OF PHOSPHORUS: CPT

## 2020-08-08 PROCEDURE — 3331090002 HH PPS REVENUE DEBIT

## 2020-08-08 PROCEDURE — 36600 WITHDRAWAL OF ARTERIAL BLOOD: CPT

## 2020-08-08 PROCEDURE — 84145 PROCALCITONIN (PCT): CPT

## 2020-08-08 PROCEDURE — 85610 PROTHROMBIN TIME: CPT

## 2020-08-08 PROCEDURE — 94762 N-INVAS EAR/PLS OXIMTRY CONT: CPT

## 2020-08-08 PROCEDURE — 77010033678 HC OXYGEN DAILY

## 2020-08-08 PROCEDURE — 93005 ELECTROCARDIOGRAM TRACING: CPT

## 2020-08-08 PROCEDURE — 80048 BASIC METABOLIC PNL TOTAL CA: CPT

## 2020-08-08 PROCEDURE — 83735 ASSAY OF MAGNESIUM: CPT

## 2020-08-08 RX ORDER — LEVOFLOXACIN 5 MG/ML
750 INJECTION, SOLUTION INTRAVENOUS EVERY 24 HOURS
Status: DISCONTINUED | OUTPATIENT
Start: 2020-08-08 | End: 2020-08-11

## 2020-08-08 RX ORDER — INSULIN LISPRO 100 [IU]/ML
INJECTION, SOLUTION INTRAVENOUS; SUBCUTANEOUS
Status: DISCONTINUED | OUTPATIENT
Start: 2020-08-08 | End: 2020-08-12 | Stop reason: HOSPADM

## 2020-08-08 RX ADMIN — INSULIN GLARGINE 45 UNITS: 100 INJECTION, SOLUTION SUBCUTANEOUS at 21:58

## 2020-08-08 RX ADMIN — IPRATROPIUM BROMIDE AND ALBUTEROL SULFATE 3 ML: .5; 3 SOLUTION RESPIRATORY (INHALATION) at 12:00

## 2020-08-08 RX ADMIN — ASPIRIN 81 MG: 81 TABLET, COATED ORAL at 08:41

## 2020-08-08 RX ADMIN — IPRATROPIUM BROMIDE AND ALBUTEROL SULFATE 3 ML: .5; 3 SOLUTION RESPIRATORY (INHALATION) at 08:41

## 2020-08-08 RX ADMIN — IPRATROPIUM BROMIDE AND ALBUTEROL SULFATE 3 ML: .5; 3 SOLUTION RESPIRATORY (INHALATION) at 16:00

## 2020-08-08 RX ADMIN — PIPERACILLIN SODIUM AND TAZOBACTAM SODIUM 4.5 G: 4; .5 INJECTION, POWDER, LYOPHILIZED, FOR SOLUTION INTRAVENOUS at 08:41

## 2020-08-08 RX ADMIN — INSULIN LISPRO 9 UNITS: 100 INJECTION, SOLUTION INTRAVENOUS; SUBCUTANEOUS at 16:48

## 2020-08-08 RX ADMIN — INSULIN LISPRO 3 UNITS: 100 INJECTION, SOLUTION INTRAVENOUS; SUBCUTANEOUS at 22:00

## 2020-08-08 RX ADMIN — METHYLPREDNISOLONE SODIUM SUCCINATE 60 MG: 40 INJECTION, POWDER, FOR SOLUTION INTRAMUSCULAR; INTRAVENOUS at 08:41

## 2020-08-08 RX ADMIN — MONTELUKAST 10 MG: 10 TABLET, FILM COATED ORAL at 21:01

## 2020-08-08 RX ADMIN — ENOXAPARIN SODIUM 40 MG: 40 INJECTION SUBCUTANEOUS at 08:41

## 2020-08-08 RX ADMIN — IPRATROPIUM BROMIDE AND ALBUTEROL SULFATE 3 ML: .5; 3 SOLUTION RESPIRATORY (INHALATION) at 05:25

## 2020-08-08 RX ADMIN — IPRATROPIUM BROMIDE AND ALBUTEROL SULFATE 3 ML: .5; 3 SOLUTION RESPIRATORY (INHALATION) at 19:57

## 2020-08-08 RX ADMIN — HYDROCHLOROTHIAZIDE 12.5 MG: 25 TABLET ORAL at 08:41

## 2020-08-08 RX ADMIN — LEVOFLOXACIN 750 MG: 5 INJECTION INTRAVENOUS at 18:40

## 2020-08-08 RX ADMIN — FUROSEMIDE 20 MG: 20 TABLET ORAL at 08:41

## 2020-08-08 RX ADMIN — PANTOPRAZOLE 40 MG: 40 TABLET, DELAYED RELEASE ORAL at 07:30

## 2020-08-08 RX ADMIN — Medication 5 ML: at 14:00

## 2020-08-08 RX ADMIN — PIPERACILLIN SODIUM AND TAZOBACTAM SODIUM 4.5 G: 4; .5 INJECTION, POWDER, LYOPHILIZED, FOR SOLUTION INTRAVENOUS at 20:55

## 2020-08-08 RX ADMIN — PIPERACILLIN SODIUM AND TAZOBACTAM SODIUM 4.5 G: 4; .5 INJECTION, POWDER, LYOPHILIZED, FOR SOLUTION INTRAVENOUS at 05:25

## 2020-08-08 RX ADMIN — METHYLPREDNISOLONE SODIUM SUCCINATE 60 MG: 40 INJECTION, POWDER, FOR SOLUTION INTRAMUSCULAR; INTRAVENOUS at 21:01

## 2020-08-08 RX ADMIN — INSULIN LISPRO 6 UNITS: 100 INJECTION, SOLUTION INTRAVENOUS; SUBCUTANEOUS at 08:58

## 2020-08-08 RX ADMIN — LISINOPRIL 20 MG: 20 TABLET ORAL at 08:41

## 2020-08-08 RX ADMIN — PIPERACILLIN SODIUM AND TAZOBACTAM SODIUM 4.5 G: 4; .5 INJECTION, POWDER, LYOPHILIZED, FOR SOLUTION INTRAVENOUS at 14:42

## 2020-08-08 RX ADMIN — FLUTICASONE PROPIONATE 2 PUFF: 110 AEROSOL, METERED RESPIRATORY (INHALATION) at 20:55

## 2020-08-08 RX ADMIN — INSULIN LISPRO 9 UNITS: 100 INJECTION, SOLUTION INTRAVENOUS; SUBCUTANEOUS at 12:40

## 2020-08-08 NOTE — PHYSICIAN ADVISORY
Letter of Status Determination:   Recommended hospitalization status   INPATIENT  Status     Pt Name:  Zaida Morgan   MR#   [address] # [id number] /  [id number]  Payor: Ross Giron / [facility] / Product Type: Medicare /    [facility]#  [id number]   [address]  @ [address] center   Hospitalization date  8/7/2020  2:46 PM   Current Attending Physician  José Ford MD   Principal diagnosis  COPD exacerbation [facility]) [J44.1]  Pneumonia due to COVID-19 virus [U07.1, J12.89]     Clinicals   60 y.o. y.o  female hospitalized with above diagnosis        [facility] criteria   Does  NOT apply    STATUS DETERMINATION  This patient is at high risk of adverse events and deterioration based on documented clinical data, comorbid conditions and current acute care course. Ms. Zaida Morgan is expected to meet Inpatient Admission status criteria in accordance with CMS regulation Section 43 .3. Specifically, due to medical necessity the patient's stay is expected to exceed Two Midnights. It is our recommendation that this patient's hospitalization INPATIENT status is appropriate. The final decision of the patient's hospitalization status depends on the attending physician's judgment. Additional comments       Payor: [place] MEDICARE / Plan: VA MEDICARE PART A & B / Product Type: Medicare /     The information in this document is a recommendation to be used for utilization review and utilization management purposes only. This recommendation is not an order. The recommendation is made based on the information reviewed at the time of the referral, is pursuant to the [place] [facility] Conditions of Participation (42 CFR Part 482), and is neither a judgment nor an assessment with regard to the appropriateness or quality of clinical care. For all Managed Care patients:  The Criteria are intended solely for use as screening guidelines with respect to the medical appropriateness of healthcare services and not for final clinical or payment determinations concerning the type or level of medical care provided, or proposed to be provided, to a patient. They help the reviewers determine whether a patient is appropriate for observation or inpatient admission at the time a decision to admit the patient is being made. All efforts are made to apply the pertinent payor criteria ([employer] or [employer]) as well as the clinical judgements based on the information reviewed at the time of the referral.\" Nothing in this document may be used to limit, alter, or affect clinical services provided to the patient named below. Charisse Berrios MD  Physician Cato Chant Dr. Tisha Krabbe, [address] [place] [address]. [email or web address]

## 2020-08-08 NOTE — ED PROVIDER NOTES
EMERGENCY DEPARTMENT HISTORY AND PHYSICAL EXAM    Date: 8/7/2020  Patient Name: Jeromy Eason    History of Presenting Illness     Chief Complaint   Patient presents with    Other     Covid positive    Shortness of Breath       History Provided By: Patient    Chief complaint: This is a 60year old female with a complex past medical history significant for COPD, chronic hypoxic and hypercapnic respiratory failure on home oxygen as well as poorly controlled severe persistent asthma with steroid dependence, gerd, htn, hld, SHERRIE and recently diagnosed COVID 19 pneumonia who presented to the ED with chief complaint of SOB on going since last admission and worsening since last few days with associated fevers, chills, cough. No chest pain. No n/v/d/c. No abd pain. she was seen in the ED 7/30. She was discharged form [facility] 7/27-7/22, she was discharged from [facility] 7/18-7/09, she was seen in ED 7/06 for same complaint of COPD/ COVID.        PCP: Alan Dan MD    Current Facility-Administered Medications   Medication Dose Route Frequency Provider Last Rate Last Dose    acetaminophen (TYLENOL) tablet 500 mg  500 mg Oral Q6H PRN Jeni Jane,         aspirin delayed-release tablet 81 mg  81 mg Oral DAILY Jeni Garcia,         azithromycin (ZITHROMAX) tablet 250 mg  250 mg Oral Q MON, WED & FRI Jeni Garcia, DO   250 mg at 08/07/20 2100    furosemide (LASIX) tablet 20 mg  20 mg Oral DAILY Jeni Garcia,         hydroCHLOROthiazide (HYDRODIURIL) tablet 12.5 mg  12.5 mg Oral DAILY Jeni Garcia, DO        lisinopriL (PRINIVIL, ZESTRIL) tablet 20 mg  20 mg Oral BID Jeni Garcia,         loratadine (CLARITIN) tablet 10 mg  10 mg Oral DAILY PRN Jeni Garcia,         montelukast (SINGULAIR) tablet 10 mg  10 mg Oral QHS Jeni Garcia, DO   10 mg at 08/07/20 2200    tiotropium bromide (SPIRIVA RESPIMAT) 2.5 mcg /actuation  2 Puff Inhalation QAM RT Oscar Jane N, DO  sodium chloride (NS) flush 5-40 mL  5-40 mL IntraVENous Q8H Jeni Garcia, DO   10 mL at 08/07/20 2200    sodium chloride (NS) flush 5-40 mL  5-40 mL IntraVENous PRN Jeni Garcia, DO        acetaminophen (TYLENOL) tablet 650 mg  650 mg Oral Q6H PRN Jeni Garcia, DO        Or    acetaminophen (TYLENOL) suppository 650 mg  650 mg Rectal Q6H PRN Jeni Garcia, DO        polyethylene glycol (MIRALAX) packet 17 g  17 g Oral DAILY PRN Jeni Garcia, DO        promethazine (PHENERGAN) tablet 12.5 mg  12.5 mg Oral Q6H PRN Jeni Garcia,         Or    ondansetron (ZOFRAN) injection 4 mg  4 mg IntraVENous Q6H PRN Jeni Garcia, DO        enoxaparin (LOVENOX) injection 40 mg  40 mg SubCUTAneous DAILY Jeni Garcia, DO        fluticasone (FLOVENT HFA) 110 mcg inhaler  2 Puff Inhalation BID RT Jeni Garcia, DO        methylPREDNISolone (PF) (SOLU-MEDROL) injection 60 mg  60 mg IntraVENous Q12H Jeni Garcia, DO        insulin glargine (LANTUS) injection 45 Units  45 Units SubCUTAneous QHS Jeni Garcia, DO   45 Units at 08/07/20 2200    piperacillin-tazobactam (ZOSYN) 4.5 g in 0.9% sodium chloride (MBP/ADV) 100 mL MBP  4.5 g IntraVENous Q6H David Peralta  mL/hr at 08/07/20 2056 4.5 g at 08/07/20 2056    albuterol-ipratropium (DUO-NEB) 2.5 MG-0.5 MG/3 ML  3 mL Nebulization Q4H RT David Peralta MD   3 mL at 08/07/20 2107    pantoprazole (PROTONIX) tablet 40 mg  40 mg Oral ACB David Peralta MD        glucose chewable tablet 16 g  4 Tab Oral PRN David Peralta MD        glucagon (GLUCAGEN) injection 1 mg  1 mg IntraMUSCular PRN David Peralta MD        dextrose (D50W) injection syrg 12.5-25 g  25-50 mL IntraVENous PRN David Peralta MD        insulin lispro (HUMALOG) injection   SubCUTAneous AC&HS David Peralta MD         Current Outpatient Medications   Medication Sig Dispense Refill    acetaminophen (TYLENOL) 500 mg tablet Take 500 mg by mouth every six (6) hours as needed for Fever or Pain.  benzonatate (TESSALON) 100 mg capsule Take 1 Cap by mouth three (3) times daily as needed for Cough for up to 20 days. Indications: cough 30 Cap 1    fluticasone propionate (Flovent HFA) 220 mcg/actuation inhaler Take 1 Puff by inhalation two (2) times a day. 1 Inhaler 0    albuterol-ipratropium (DUO-NEB) 2.5 mg-0.5 mg/3 ml nebu 3 mL by Nebulization route every four (4) hours as needed for Wheezing. 30 Nebule 0    albuterol (PROVENTIL HFA, VENTOLIN HFA, PROAIR HFA) 90 mcg/actuation inhaler Take 2 Puffs by inhalation every four (4) hours as needed for Wheezing. 1 Inhaler 0    azithromycin (ZITHROMAX) 250 mg tablet Take 1 Tab by mouth every Monday, Wednesday, Friday. Monday-Friday. 30 Tab 0    omeprazole (PRILOSEC) 40 mg capsule Take 40 mg by mouth daily.  insulin glargine (Basaglar KwikPen U-100 Insulin) 100 unit/mL (3 mL) inpn 45 Units by SubCUTAneous route nightly.  furosemide (Lasix) 20 mg tablet Take 20 mg by mouth daily.  lactobacillus sp. 50 billion cpu (BIO-K PLUS) 50 billion cell -375 mg cap capsule Take 1 Cap by mouth daily. 30 Cap 0    simethicone (GAS-X) 125 mg capsule Take 125 mg by mouth four (4) times daily as needed for Flatulence.  loratadine (CLARITIN) 10 mg tablet Take 10 mg by mouth daily as needed for Allergies.  lisinopril (PRINIVIL, ZESTRIL) 20 mg tablet Take 20 mg by mouth two (2) times a day.  fluticasone propion-salmeterol (ADVAIR HFA) 230-21 mcg/actuation inhaler Take 2 Puffs by inhalation two (2) times a day.  hydroCHLOROthiazide (HYDRODIURIL) 12.5 mg tablet Take 12.5 mg by mouth daily.  tiotropium bromide (SPIRIVA RESPIMAT) 2.5 mcg/actuation inhaler Take 2 Puffs by inhalation daily.       NOVOLOG FLEXPEN U-100 INSULIN 100 unit/mL inpn Continue home Sliding scale insulin as prior to admission (Patient taking differently: 1 Units by SubCUTAneous route three (3) times daily. If BG <100=0u  101-150=5u  151-250=8u  251-300=12u  >300 call MD  ) 1 Pen 0    OXYGEN-AIR DELIVERY SYSTEMS 2 L by Nasal route continuous. [facility]      aspirin delayed-release 81 mg tablet Take 81 mg by mouth daily.  montelukast (SINGULAIR) 10 mg tablet Take 10 mg by mouth nightly. Past History     Past Medical History:  Past Medical History:   Diagnosis Date    Asthma     Chronic lung disease     COPD     Cystocele, midline     Diabetes mellitus ([address]     GERD (gastroesophageal reflux disease)     Hidradenitis suppurativa     Hyperlipidemia     Hypertension     SHERRIE on CPAP     CPAP    Stress incontinence        Past Surgical History:  Past Surgical History:   Procedure Laterality Date    BREAST SURGERY PROCEDURE UNLISTED      Right breast benign tumor removal    HX APPENDECTOMY      HX CHOLECYSTECTOMY      HX DILATION AND CURETTAGE      Dysfunctional uterine bleeding, thought 2/2 fibroids    HX TUBAL LIGATION         Family History:  Family History   Problem Relation Age of Onset    Hypertension Mother     Stroke Mother     Breast Cancer Mother         Bilateral mastectomies    Cancer Mother         ovarian and breast    Heart Failure Mother     Heart Attack Father         2011    Heart Surgery Father         CABG    Heart Failure Father     COPD Sister         Heavy smoker    Cancer Sister         ovarian    Heart Failure Sister     Lung Disease Sister     Asthma Child     Cancer Maternal Aunt         pancreatic    Cancer Maternal Grandfather         stomach       Social History:  Social History     Tobacco Use    Smoking status: Former Smoker     Packs/day: 1.00     Years: 30.00     Pack years: 30.00     Types: Cigarettes     Start date: 1966     Last attempt to quit: 2006     Years since quittin.9    Smokeless tobacco: Former User    Tobacco comment: 1-1.5 packs per day   Substance Use Topics    Alcohol use: No    Drug use:  No Allergies: Allergies   Allergen Reactions    Ancef [Cefazolin] Hives    Contrast Agent [Iodine] Anaphylaxis, Shortness of Breath and Swelling     Needs pre-medication for IV contrast with Benadryl, Solu-Medrol    Fish Containing Products Anaphylaxis     Pt states she had a severe allergic reaction at 9 y/o.  Statins-Hmg-Coa Reductase Inhibitors Myalgia    Metformin Other (comments)     Abdominal pain, diarrhea.  Codeine Other (comments)     Altered mental status         Review of Systems       Review of Systems   Constitutional: Positive for fever. Negative for chills, diaphoresis and fatigue. HENT: Negative for congestion, ear pain and sore throat. Eyes: Negative for pain. Respiratory: Positive for cough and shortness of breath. Negative for chest tightness and wheezing. Cardiovascular: Negative for chest pain, palpitations and leg swelling. Gastrointestinal: Negative for abdominal pain, constipation, diarrhea, nausea and vomiting. Genitourinary: Negative for dysuria, flank pain, hematuria, pelvic pain, vaginal bleeding and vaginal discharge. Musculoskeletal: Negative for back pain, joint swelling, neck pain and neck stiffness. Neurological: Negative for dizziness, weakness, light-headedness and headaches. Physical Exam     Visit Vitals  /59   Pulse (!) 105   Temp 98.2 °F (36.8 °C)   Resp 14   Ht 5' 3\" (1.6 m)   Wt 121.1 kg (267 lb)   SpO2 98%   BMI 47.30 kg/m²         Physical Exam  Vitals signs and nursing note reviewed. Constitutional:       General: She is not in acute distress. Appearance: Normal appearance. She is not ill-appearing, toxic-appearing or diaphoretic. HENT:      Head: Normocephalic and atraumatic. Neck:      Musculoskeletal: Normal range of motion and neck supple. No neck rigidity or muscular tenderness. Cardiovascular:      Rate and Rhythm: Regular rhythm. Tachycardia present. Pulses: Normal pulses.       Heart sounds: Normal heart sounds. No murmur. Pulmonary:      Effort: Pulmonary effort is normal. No respiratory distress. Breath sounds: Wheezing present. Abdominal:      General: Bowel sounds are normal. There is no distension. Palpations: Abdomen is soft. Tenderness: There is no abdominal tenderness. There is no right CVA tenderness or left CVA tenderness. Musculoskeletal: Normal range of motion. Skin:     General: Skin is warm and dry. Capillary Refill: Capillary refill takes less than 2 seconds. Neurological:      General: No focal deficit present. Mental Status: She is alert and oriented to person, place, and time. Psychiatric:         Behavior: Behavior normal.         Diagnostic Study Results     Labs -  Recent Results (from the past 12 hour(s))   POC LACTIC ACID    Collection Time: 08/07/20  3:06 PM   Result Value Ref Range    Lactic Acid (POC) 2.32 (HH) 0.40 - 2.00 mmol/L   CBC WITH AUTOMATED DIFF    Collection Time: 08/07/20  3:10 PM   Result Value Ref Range    WBC 8.9 4.6 - 13.2 K/uL    RBC 4.44 4.20 - 5.30 M/uL    HGB 13.8 12.0 - 16.0 g/dL    HCT 40.9 35.0 - 45.0 %    MCV 92.1 74.0 - 97.0 FL    MCH 31.1 24.0 - 34.0 PG    MCHC 33.7 31.0 - 37.0 g/dL    RDW 14.4 11.6 - 14.5 %    PLATELET 285 299 - 934 K/uL    MPV 9.0 (L) 9.2 - 11.8 FL    NEUTROPHILS 60 40 - 73 %    LYMPHOCYTES 27 21 - 52 %    MONOCYTES 11 (H) 3 - 10 %    EOSINOPHILS 1 0 - 5 %    BASOPHILS 1 0 - 2 %    ABS. NEUTROPHILS 5.4 1.8 - 8.0 K/UL    ABS. LYMPHOCYTES 2.4 0.9 - 3.6 K/UL    ABS. MONOCYTES 0.9 0.05 - 1.2 K/UL    ABS. EOSINOPHILS 0.1 0.0 - 0.4 K/UL    ABS.  BASOPHILS 0.0 0.0 - 0.1 K/UL    DF AUTOMATED     METABOLIC PANEL, COMPREHENSIVE    Collection Time: 08/07/20  3:10 PM   Result Value Ref Range    Sodium 141 136 - 145 mmol/L    Potassium 3.7 3.5 - 5.5 mmol/L    Chloride 102 100 - 111 mmol/L    CO2 31 21 - 32 mmol/L    Anion gap 8 3.0 - 18 mmol/L    Glucose 156 (H) 74 - 99 mg/dL    BUN 23 (H) 7.0 - 18 MG/DL    Creatinine 0.84 0.6 - 1.3 MG/DL    BUN/Creatinine ratio 27 (H) 12 - 20      GFR est AA >60 >60 ml/min/1.73m2    GFR est non-AA >60 >60 ml/min/1.73m2    Calcium 10.5 (H) 8.5 - 10.1 MG/DL    Bilirubin, total 0.7 0.2 - 1.0 MG/DL    ALT (SGPT) 52 13 - 56 U/L    AST (SGOT) 17 10 - 38 U/L    Alk.  phosphatase 66 45 - 117 U/L    Protein, total 7.7 6.4 - 8.2 g/dL    Albumin 3.8 3.4 - 5.0 g/dL    Globulin 3.9 2.0 - 4.0 g/dL    A-G Ratio 1.0 0.8 - 1.7     MAGNESIUM    Collection Time: 08/07/20  3:10 PM   Result Value Ref Range    Magnesium 1.6 1.6 - 2.6 mg/dL   NT-PRO BNP    Collection Time: 08/07/20  3:10 PM   Result Value Ref Range    NT pro-BNP 45 0 - 900 PG/ML   CARDIAC PANEL,(CK, CKMB & TROPONIN)    Collection Time: 08/07/20  3:10 PM   Result Value Ref Range    CK - MB <1.0 <3.6 ng/ml    CK-MB Index  0.0 - 4.0 %     CALCULATION NOT PERFORMED WHEN RESULT IS BELOW LINEAR LIMIT    CK 31 26 - 192 U/L    Troponin-I, QT <0.02 0.0 - 0.045 NG/ML   URINALYSIS W/ RFLX MICROSCOPIC    Collection Time: 08/07/20  3:11 PM   Result Value Ref Range    Color YELLOW      Appearance CLEAR      Specific gravity 1.009 1.005 - 1.030      pH (UA) 5.0 5.0 - 8.0      Protein Negative NEG mg/dL    Glucose Negative NEG mg/dL    Ketone Negative NEG mg/dL    Bilirubin Negative NEG      Blood Negative NEG      Urobilinogen 0.2 0.2 - 1.0 EU/dL    Nitrites Negative NEG      Leukocyte Esterase Negative NEG     EKG, 12 LEAD, INITIAL    Collection Time: 08/07/20  3:24 PM   Result Value Ref Range    Ventricular Rate 94 BPM    Atrial Rate 94 BPM    P-R Interval 130 ms    QRS Duration 78 ms    Q-T Interval 346 ms    QTC Calculation (Bezet) 432 ms    Calculated P Axis 52 degrees    Calculated R Axis 13 degrees    Calculated T Axis 59 degrees    Diagnosis       Normal sinus rhythm  Cannot rule out Anterior infarct (cited on or before 22-JUL-2020)  Abnormal ECG  When compared with ECG of 30-JUL-2020 09:34,  No significant change was found     POC LACTIC ACID    Collection Time: 08/07/20  6:19 PM   Result Value Ref Range    Lactic Acid (POC) 2.83 (HH) 0.40 - 2.00 mmol/L   GLUCOSE, POC    Collection Time: 08/07/20 10:59 PM   Result Value Ref Range    Glucose (POC) 319 (H) 70 - 110 mg/dL       Radiologic Studies -   XR CHEST PORT   Final Result   IMPRESSION:   Interval improved linear peripheral opacities in the bilateral mid and lower   lung zones, suggesting of scarring although persistent infiltrates are not   excluded. Recommend continued imaging follow-up. Medical Decision Making   I am the first provider for this patient. I reviewed the vital signs, available nursing notes, past medical history, past surgical history, family history and social history. Vital Signs-Reviewed the patient's vital signs. Records Reviewed: Nursing Notes and Old Medical Records (Time of Review: 12:07 AM)    ED Course: Progress Notes, Reevaluation, and Consults:      Provider Notes (Medical Decision Making):     COPD exacerbation in patient with COPD, chronic hypoxic and hypercapnic respiratory failure on home oxygen as well as poorly controlled severe persistent asthma with steroid dependence with recently diagnosed COVD 19 pneumonia. She as tachycardic and tachypnic in triage triggering sepsis protocol. CBC, CMP, cardiac panel, BNP unremarkable. ECG with normal sinus rhythm. CXR Interval improved linear peripheral opacities in the bilateral mid and lower lung zones, suggesting of scarring although persistent infiltrates are not  excluded    UA negative     Lactic acid 2.32 and 2.83 on repeat. Continue IVF, IV abx. Blood cultures pending. She received 3 nebs, 60mg IV solumedrol, 2L fluid bolus with no improvement in symptoms- she remained with SOB. Standard discussion with PFM including pmh, hpi, labs / imaging. PFM will admit patient for further eval and treatment. Diagnosis     Clinical Impression:   1. COPD exacerbation ([address]    2.  Lactic acidosis Disposition: home       Follow-up Information    None          Patient's Medications   Start Taking    No medications on file   Continue Taking    ACETAMINOPHEN (TYLENOL) 500 MG TABLET    Take 500 mg by mouth every six (6) hours as needed for Fever or Pain. ALBUTEROL (PROVENTIL HFA, VENTOLIN HFA, PROAIR HFA) 90 MCG/ACTUATION INHALER    Take 2 Puffs by inhalation every four (4) hours as needed for Wheezing. ALBUTEROL-IPRATROPIUM (DUO-NEB) 2.5 MG-0.5 MG/3 ML NEBU    3 mL by Nebulization route every four (4) hours as needed for Wheezing. ASPIRIN DELAYED-RELEASE 81 MG TABLET    Take 81 mg by mouth daily. AZITHROMYCIN (ZITHROMAX) 250 MG TABLET    Take 1 Tab by mouth every Monday, Wednesday, Friday. Monday-Friday. BENZONATATE (TESSALON) 100 MG CAPSULE    Take 1 Cap by mouth three (3) times daily as needed for Cough for up to 20 days. Indications: cough    FLUTICASONE PROPION-SALMETEROL (ADVAIR HFA) 230-21 MCG/ACTUATION INHALER    Take 2 Puffs by inhalation two (2) times a day. FLUTICASONE PROPIONATE (FLOVENT HFA) 220 MCG/ACTUATION INHALER    Take 1 Puff by inhalation two (2) times a day. FUROSEMIDE (LASIX) 20 MG TABLET    Take 20 mg by mouth daily. HYDROCHLOROTHIAZIDE (HYDRODIURIL) 12.5 MG TABLET    Take 12.5 mg by mouth daily. INSULIN GLARGINE (BASAGLAR KWIKPEN U-100 INSULIN) 100 UNIT/ML (3 ML) INPN    45 Units by SubCUTAneous route nightly. LACTOBACILLUS SP. 50 BILLION CPU (BIO-K PLUS) 50 BILLION CELL -375 MG CAP CAPSULE    Take 1 Cap by mouth daily. LISINOPRIL (PRINIVIL, ZESTRIL) 20 MG TABLET    Take 20 mg by mouth two (2) times a day. LORATADINE (CLARITIN) 10 MG TABLET    Take 10 mg by mouth daily as needed for Allergies. MONTELUKAST (SINGULAIR) 10 MG TABLET    Take 10 mg by mouth nightly. NOVOLOG FLEXPEN U-100 INSULIN 100 UNIT/ML INPN    Continue home Sliding scale insulin as prior to admission    OMEPRAZOLE (PRILOSEC) 40 MG CAPSULE    Take 40 mg by mouth daily. OXYGEN-AIR DELIVERY SYSTEMS    2 L by Nasal route continuous. [facility] [employer]    SIMETHICONE (GAS-X) 125 MG CAPSULE    Take 125 mg by mouth four (4) times daily as needed for Flatulence. TIOTROPIUM BROMIDE (SPIRIVA RESPIMAT) 2.5 MCG/ACTUATION INHALER    Take 2 Puffs by inhalation daily. These Medications have changed    No medications on file   Stop Taking    No medications on file       Dictation disclaimer:  Please note that this dictation was completed with [employer], the computer voice recognition software. Quite often unanticipated grammatical, syntax, homophones, and other interpretive errors are inadvertently transcribed by the computer software. Please disregard these errors. Please excuse any errors that have escaped final proofreading.

## 2020-08-08 NOTE — CONSULTS
[address] [place] [address] [facility]  Pulmonary, Critical Care, and Sleep Medicine    Initial Patient Consult    Name: Jaqueline Alvarez MRN: [id number]   : 1960 Hospital: [address] [place] [address]   Date: 2020        IMPRESSION:   · Acute exacerbation of COPD/asthma overlap syndrome -- Secondary to recent COVID-19 pneumonia/sepsis- now with bronchitis  · Acute on chronic hypoxic and hypercapnic respiratory failure. O2 requirements increased to 4 LPM, baseline 2 LPM  · Underlying poorly controlled severe persistent asthma with steroid dependence  · COPD, gold D  · COVID-19 pneumonia/severe sepsis   · Worsening dyspnea/SOB:  Etiology multifactorial, due to above  · Morbid obesity:Body mass index is 47.3 kg/m². · Obesity hypoventilation syndrome with SHERRIE: Patient on trilogy in the [address] [place] [address] setting at home  · Chronic steroid dependence  · Previous smoking hx:  Quit 14 years ago  · CHFpEF  · Pulm HTN:  WHO groups 2 and 3 disease      RECOMMENDATIONS:     · Supplemental oxygen to maintain SpO2 >88%  · Bipap-ST QHS and PRN -- can do AVAPS-ST mode if possible  · Continue scheduled bronchodilators: duonebs q4h, pulmicort nebs 1mg BID, and albuterol PRN   · Please hold home bronchodilators. May resume on discharge  · Continue IV steroids -Solu-Medrol 60mg BID. Wean as tolerated  · No further COVID pneumonia treatment- has received Remdesivir and Convalescent plasma  · Maintain net negative fluid balance as renal function tolerates. Diuresis per primary service   · Please assess for home oxygen need prior to discharge  · Aggressive pulmonary toileting/bronchial hygiene  · Frequent incentive spirometry  · Aspiration precautions including elevating HOB >30deg  · PT/OT, OOB, ambulate with assistance as tolerated  · DVT ppx per primary service -- advise high intensity ppx with lovenox SQ 40mg BID  · GI ppx due to high dose steroids  · Will follow     Subjective:      This patient has been seen and evaluated at the request of Dr. Emili Hutton for acute hypoxic respiratory failure and exacerbated asthma. Dimitrios Breaux is a 60 y.o. female with PMH ASTHMA -COPD overlap syndrome on home O2 (3L NC), T2DM, HTN, HFpEF, SHERRIE, now presenting with complaint of worsening dyspnea in the setting of recent covid infection.     Pt seen at bedside at [facility] ED. Pt complained of a 3 day history of worsening dyspnea and pleuritic chest pain. She described her dyspnea as severe to the point that she feels as though she is \"drowning. \"   Her SOB has made it difficult for her to sleep at night. She also endorsed a 10-day history of cough productive of green sputum, headache and intermittent sweating. She was seen by Dr. Harshal Mancia yesterday via telehealth and was started on Levaquin 500 mg for 1 week and her PO prednisone was increased to 40 mg. She was recently admitted and discharged from [facility] for COVID 19 on 7/27/2020. She is compliant to her home meds which include albuterol, advair, duo nebs, and flovent. She denies sinus tenderness, N/V, diarrhea, myalgias, or fever. Denies chest pain, palpitations or LE edema. She states this morning she is feeling better although just cannot get over this prolonged decline in her respiratory health. Prior smoking history- quit 13 years ago. I have reviewed all of the available data including the patient's previous history external records and radiological imaging available for review. Patient is a 60 y.o. female with a past medical history of/overlap syndrome, chronic steroid dependence, obesity hypoventilation syndrome on home trilogy machine, morbid obesity, CHFpEF, chronic hypoxic respiratory failure presented to [facility] with complaints of worsening shortness of breath and dyspnea. Patient was recently discharged from [facility] last week after initial hospitalization for COVID-19 pneumonia/sepsis.   During that hospitalization, patient received Remdesevir, IV steroids, convalescent plasma. Patient was discharged on a prednisone taper, notes that she was using nebulizer twice on the weekend, but then did not use it for the next few days, and noted worsening shortness of breath and dyspnea. She came back to ER and was treated as inpatient again for 5 days  Patient is very compliant to her treatment.     In addition applicable cardiology and other lab data were also reviewed. Past Medical History:   Diagnosis Date    Asthma     Chronic lung disease     COPD     Cystocele, midline     Diabetes mellitus ([address]     GERD (gastroesophageal reflux disease)     Hidradenitis suppurativa     Hyperlipidemia     Hypertension     SHERRIE on CPAP     CPAP    Stress incontinence       Past Surgical History:   Procedure Laterality Date    BREAST SURGERY PROCEDURE UNLISTED      Right breast benign tumor removal    HX APPENDECTOMY      HX CHOLECYSTECTOMY      HX DILATION AND CURETTAGE      Dysfunctional uterine bleeding, thought 2/2 fibroids    HX TUBAL LIGATION        Prior to Admission medications    Medication Sig Start Date End Date Taking? Authorizing Provider   acetaminophen (TYLENOL) 500 mg tablet Take 500 mg by mouth every six (6) hours as needed for Fever or Pain. Yes Provider, Historical   benzonatate (TESSALON) 100 mg capsule Take 1 Cap by mouth three (3) times daily as needed for Cough for up to 20 days. Indications: cough 7/27/20 8/16/20 Yes Jeni Garcia N, DO   fluticasone propionate (Flovent HFA) 220 mcg/actuation inhaler Take 1 Puff by inhalation two (2) times a day. 7/7/20  Yes Evonne Kate PA-C   albuterol-ipratropium (DUO-NEB) 2.5 mg-0.5 mg/3 ml nebu 3 mL by Nebulization route every four (4) hours as needed for Wheezing. 7/7/20  Yes Evonne Kate PA-C   albuterol (PROVENTIL HFA, VENTOLIN HFA, PROAIR HFA) 90 mcg/actuation inhaler Take 2 Puffs by inhalation every four (4) hours as needed for Wheezing.  7/7/20  Yes Evonne Kate PA-C   azithromycin [facility]) 250 mg tablet Take 1 Tab by mouth every Monday, Wednesday, Friday. Monday-Friday. 7/3/20  Yes Tony Troncoso MD   omeprazole (PRILOSEC) 40 mg capsule Take 40 mg by mouth daily. 3/9/18  Yes Provider, Historical   insulin glargine (Basaglar KwikPen U-100 Insulin) 100 unit/mL (3 mL) inpn 45 Units by SubCUTAneous route nightly. Yes Provider, Historical   furosemide (Lasix) 20 mg tablet Take 20 mg by mouth daily. Yes Provider, Historical   lactobacillus sp. 50 billion cpu (BIO-K PLUS) 50 billion cell -375 mg cap capsule Take 1 Cap by mouth daily. 3/10/20  Yes Marybeth Valle MD   simethicone (GAS-X) 125 mg capsule Take 125 mg by mouth four (4) times daily as needed for Flatulence. Yes Provider, Historical   loratadine (CLARITIN) 10 mg tablet Take 10 mg by mouth daily as needed for Allergies. Yes Provider, Historical   lisinopril (PRINIVIL, ZESTRIL) 20 mg tablet Take 20 mg by mouth two (2) times a day. Yes Provider, Historical   fluticasone propion-salmeterol (ADVAIR HFA) 230-21 mcg/actuation inhaler Take 2 Puffs by inhalation two (2) times a day. Yes Provider, Historical   hydroCHLOROthiazide (HYDRODIURIL) 12.5 mg tablet Take 12.5 mg by mouth daily. Yes Provider, Historical   tiotropium bromide (SPIRIVA RESPIMAT) 2.5 mcg/actuation inhaler Take 2 Puffs by inhalation daily. Yes Provider, Historical   NOVOLOG FLEXPEN U-100 INSULIN 100 unit/mL inpn Continue home Sliding scale insulin as prior to admission  Patient taking differently: 1 Units by SubCUTAneous route three (3) times daily. If BG <100=0u  101-150=5u  151-250=8u  251-300=12u  >300 call MD   7/10/18  Yes Fabrice Valdivia MD   OXYGEN-AIR DELIVERY SYSTEMS 2 L by Nasal route continuous. First Choice   Yes Provider, Historical   aspirin delayed-release 81 mg tablet Take 81 mg by mouth daily. Yes Provider, Historical   montelukast (SINGULAIR) 10 mg tablet Take 10 mg by mouth nightly.    Yes Other, Phys, MD     Allergies   Allergen Reactions    Ancef [Cefazolin] Hives    Contrast Agent [Iodine] Anaphylaxis, Shortness of Breath and Swelling     Needs pre-medication for IV contrast with Benadryl, Solu-Medrol    Fish Containing Products Anaphylaxis     Pt states she had a severe allergic reaction at 7 y/o.  Statins-Hmg-Coa Reductase Inhibitors Myalgia    Metformin Other (comments)     Abdominal pain, diarrhea.  Codeine Other (comments)     Altered mental status      Social History     Tobacco Use    Smoking status: Former Smoker     Packs/day: 1.00     Years: 30.00     Pack years: 30.00     Types: Cigarettes     Start date: 1966     Last attempt to quit: 2006     Years since quittin.9    Smokeless tobacco: Former User    Tobacco comment: 1-1.5 packs per day   Substance Use Topics    Alcohol use: No      Family History   Problem Relation Age of Onset    Hypertension Mother     Stroke Mother     Breast Cancer Mother         Bilateral mastectomies    Cancer Mother         ovarian and breast    Heart Failure Mother     Heart Attack Father         2011    Heart Surgery Father         CABG    Heart Failure Father     COPD Sister         Heavy smoker    Cancer Sister         ovarian    Heart Failure Sister     Lung Disease Sister     Asthma Child     Cancer Maternal Aunt         pancreatic    Cancer Maternal Grandfather         stomach      Immunization status: up to date and documented.   Current Facility-Administered Medications   Medication Dose Route Frequency    insulin lispro (HUMALOG) injection   SubCUTAneous AC&HS    aspirin delayed-release tablet 81 mg  81 mg Oral DAILY    azithromycin (ZITHROMAX) tablet 250 mg  250 mg Oral Q MON, WED & FRI    furosemide (LASIX) tablet 20 mg  20 mg Oral DAILY    hydroCHLOROthiazide (HYDRODIURIL) tablet 12.5 mg  12.5 mg Oral DAILY    lisinopriL (PRINIVIL, ZESTRIL) tablet 20 mg  20 mg Oral BID    montelukast (SINGULAIR) tablet 10 mg  10 mg Oral QHS    tiotropium bromide (SPIRIVA RESPIMAT) 2.5 mcg /actuation  2 Puff Inhalation QAM RT    sodium chloride (NS) flush 5-40 mL  5-40 mL IntraVENous Q8H    enoxaparin (LOVENOX) injection 40 mg  40 mg SubCUTAneous DAILY    fluticasone (FLOVENT HFA) 110 mcg inhaler  2 Puff Inhalation BID RT    methylPREDNISolone (PF) (SOLU-MEDROL) injection 60 mg  60 mg IntraVENous Q12H    insulin glargine (LANTUS) injection 45 Units  45 Units SubCUTAneous QHS    piperacillin-tazobactam (ZOSYN) 4.5 g in 0.9% sodium chloride (MBP/ADV) 100 mL MBP  4.5 g IntraVENous Q6H    albuterol-ipratropium (DUO-NEB) 2.5 MG-0.5 MG/3 ML  3 mL Nebulization Q4H RT    pantoprazole (PROTONIX) tablet 40 mg  40 mg Oral ACB       Review of Systems:  A comprehensive review of systems was negative except for that written in the HPI. Objective:   Vital Signs:    Visit Vitals  /62   Pulse 96   Temp 98.2 °F (36.8 °C)   Resp 19   Ht 5' 3\" (1.6 m)   Wt 121.1 kg (267 lb)   SpO2 98%   BMI 47.30 kg/m²       O2 Device: BIPAP   O2 Flow Rate (L/min): 3 l/min   Temp (24hrs), Av °F (36.7 °C), Min:97.8 °F (36.6 °C), Max:98.2 °F (36.8 °C)       Intake/Output:   Last shift:      No intake/output data recorded. Last 3 shifts:  1901 -  0700  In: 1250 [I.V.:1250]  Out: -     Intake/Output Summary (Last 24 hours) at 2020 1243  Last data filed at 2020 2304  Gross per 24 hour   Intake 1250 ml   Output --   Net 1250 ml      Physical Exam:   General:  Alert, cooperative, no distress, appears stated age. Head:  Normocephalic, without obvious abnormality, atraumatic. Eyes:  Conjunctivae/corneas clear. PERRL, EOMs intact. Nose: Nares normal. Septum midline. Mucosa normal. No drainage or sinus tenderness. Throat: Lips, mucosa, and tongue normal. Teeth and gums normal.   Neck: Supple, symmetrical, trachea midline, no adenopathy, thyroid: no enlargment/tenderness/nodules, no carotid bruit and no JVD.    Back:   Symmetric, no curvature. ROM normal.   Lungs:   Bilateral expiratory wheezing   Chest wall:  No tenderness or deformity. Heart:  Regular rate and rhythm, S1, S2 normal, no murmur, click, rub or gallop. Abdomen:   Soft, non-tender. Bowel sounds normal. No masses,  No organomegaly. Extremities: Extremities normal, atraumatic, no cyanosis or edema. Pulses: 2+ and symmetric all extremities. Skin: Skin color, texture, turgor normal. No rashes or lesions   Lymph nodes: Cervical, supraclavicular, and axillary nodes normal.   Neurologic: Grossly nonfocal     Data review:     Recent Results (from the past 24 hour(s))   POC LACTIC ACID    Collection Time: 08/07/20  3:06 PM   Result Value Ref Range    Lactic Acid (POC) 2.32 (HH) 0.40 - 2.00 mmol/L   CULTURE, BLOOD    Collection Time: 08/07/20  3:10 PM    Specimen: Blood   Result Value Ref Range    Special Requests: NO SPECIAL REQUESTS      Culture result: NO GROWTH AFTER 14 HOURS     CBC WITH AUTOMATED DIFF    Collection Time: 08/07/20  3:10 PM   Result Value Ref Range    WBC 8.9 4.6 - 13.2 K/uL    RBC 4.44 4.20 - 5.30 M/uL    HGB 13.8 12.0 - 16.0 g/dL    HCT 40.9 35.0 - 45.0 %    MCV 92.1 74.0 - 97.0 FL    MCH 31.1 24.0 - 34.0 PG    MCHC 33.7 31.0 - 37.0 g/dL    RDW 14.4 11.6 - 14.5 %    PLATELET 076 050 - 924 K/uL    MPV 9.0 (L) 9.2 - 11.8 FL    NEUTROPHILS 60 40 - 73 %    LYMPHOCYTES 27 21 - 52 %    MONOCYTES 11 (H) 3 - 10 %    EOSINOPHILS 1 0 - 5 %    BASOPHILS 1 0 - 2 %    ABS. NEUTROPHILS 5.4 1.8 - 8.0 K/UL    ABS. LYMPHOCYTES 2.4 0.9 - 3.6 K/UL    ABS. MONOCYTES 0.9 0.05 - 1.2 K/UL    ABS. EOSINOPHILS 0.1 0.0 - 0.4 K/UL    ABS.  BASOPHILS 0.0 0.0 - 0.1 K/UL    DF AUTOMATED     METABOLIC PANEL, COMPREHENSIVE    Collection Time: 08/07/20  3:10 PM   Result Value Ref Range    Sodium 141 136 - 145 mmol/L    Potassium 3.7 3.5 - 5.5 mmol/L    Chloride 102 100 - 111 mmol/L    CO2 31 21 - 32 mmol/L    Anion gap 8 3.0 - 18 mmol/L    Glucose 156 (H) 74 - 99 mg/dL    BUN 23 (H) 7.0 - 18 MG/DL    Creatinine 0.84 0.6 - 1.3 MG/DL    BUN/Creatinine ratio 27 (H) 12 - 20      GFR est AA >60 >60 ml/min/1.73m2    GFR est non-AA >60 >60 ml/min/1.73m2    Calcium 10.5 (H) 8.5 - 10.1 MG/DL    Bilirubin, total 0.7 0.2 - 1.0 MG/DL    ALT (SGPT) 52 13 - 56 U/L    AST (SGOT) 17 10 - 38 U/L    Alk.  phosphatase 66 45 - 117 U/L    Protein, total 7.7 6.4 - 8.2 g/dL    Albumin 3.8 3.4 - 5.0 g/dL    Globulin 3.9 2.0 - 4.0 g/dL    A-G Ratio 1.0 0.8 - 1.7     MAGNESIUM    Collection Time: 08/07/20  3:10 PM   Result Value Ref Range    Magnesium 1.6 1.6 - 2.6 mg/dL   NT-PRO BNP    Collection Time: 08/07/20  3:10 PM   Result Value Ref Range    NT pro-BNP 45 0 - 900 PG/ML   CARDIAC PANEL,(CK, CKMB & TROPONIN)    Collection Time: 08/07/20  3:10 PM   Result Value Ref Range    CK - MB <1.0 <3.6 ng/ml    CK-MB Index  0.0 - 4.0 %     CALCULATION NOT PERFORMED WHEN RESULT IS BELOW LINEAR LIMIT    CK 31 26 - 192 U/L    Troponin-I, QT <0.02 0.0 - 0.045 NG/ML   URINALYSIS W/ RFLX MICROSCOPIC    Collection Time: 08/07/20  3:11 PM   Result Value Ref Range    Color YELLOW      Appearance CLEAR      Specific gravity 1.009 1.005 - 1.030      pH (UA) 5.0 5.0 - 8.0      Protein Negative NEG mg/dL    Glucose Negative NEG mg/dL    Ketone Negative NEG mg/dL    Bilirubin Negative NEG      Blood Negative NEG      Urobilinogen 0.2 0.2 - 1.0 EU/dL    Nitrites Negative NEG      Leukocyte Esterase Negative NEG     EKG, 12 LEAD, INITIAL    Collection Time: 08/07/20  3:24 PM   Result Value Ref Range    Ventricular Rate 94 BPM    Atrial Rate 94 BPM    P-R Interval 130 ms    QRS Duration 78 ms    Q-T Interval 346 ms    QTC Calculation (Bezet) 432 ms    Calculated P Axis 52 degrees    Calculated R Axis 13 degrees    Calculated T Axis 59 degrees    Diagnosis       Normal sinus rhythm  Cannot rule out Anterior infarct (cited on or before 22-JUL-2020)  Abnormal ECG  When compared with ECG of 30-JUL-2020 09:34,  No significant change was found  Confirmed by Michelle Storm ([id number]) on 8/8/2020 10:32:48 AM     CULTURE, BLOOD    Collection Time: 08/07/20  3:25 PM    Specimen: Blood   Result Value Ref Range    Special Requests: NO SPECIAL REQUESTS      Culture result: NO GROWTH AFTER 13 HOURS     POC LACTIC ACID    Collection Time: 08/07/20  6:19 PM   Result Value Ref Range    Lactic Acid (POC) 2.83 (HH) 0.40 - 2.00 mmol/L   GLUCOSE, POC    Collection Time: 08/07/20 10:59 PM   Result Value Ref Range    Glucose (POC) 319 (H) 70 - 110 mg/dL   SED RATE (ESR)    Collection Time: 08/08/20  1:30 AM   Result Value Ref Range    Sed rate, automated 12 0 - 30 mm/hr   FERRITIN    Collection Time: 08/08/20  1:30 AM   Result Value Ref Range    Ferritin 30 8 - 388 NG/ML   D DIMER    Collection Time: 08/08/20  1:30 AM   Result Value Ref Range    D DIMER <0.27 <0.46 ug/ml(FEU)   FIBRINOGEN    Collection Time: 08/08/20  1:30 AM   Result Value Ref Range    Fibrinogen 378 210 - 451 mg/dL   PHOSPHORUS    Collection Time: 08/08/20  1:30 AM   Result Value Ref Range    Phosphorus 4.0 2.5 - 4.9 MG/DL   PROCALCITONIN    Collection Time: 08/08/20  1:30 AM   Result Value Ref Range    Procalcitonin <0.05 ng/mL   PTT    Collection Time: 08/08/20  1:30 AM   Result Value Ref Range    aPTT 26.0 23.0 - 36.4 SEC   PROTHROMBIN TIME + INR    Collection Time: 08/08/20  1:30 AM   Result Value Ref Range    Prothrombin time 13.6 11.5 - 15.2 sec    INR 1.1 0.8 - 1.2     MAGNESIUM    Collection Time: 08/08/20  1:30 AM   Result Value Ref Range    Magnesium 1.4 (L) 1.6 - 2.6 mg/dL   METABOLIC PANEL, BASIC    Collection Time: 08/08/20  4:30 AM   Result Value Ref Range    Sodium 137 136 - 145 mmol/L    Potassium 4.4 3.5 - 5.5 mmol/L    Chloride 102 100 - 111 mmol/L    CO2 23 21 - 32 mmol/L    Anion gap 12 3.0 - 18 mmol/L    Glucose 231 (H) 74 - 99 mg/dL    BUN 21 (H) 7.0 - 18 MG/DL    Creatinine 1.08 0.6 - 1.3 MG/DL    BUN/Creatinine ratio 19 12 - 20      GFR est AA >60 >60 ml/min/1.73m2    GFR est non-AA 52 (L) >60 ml/min/1.73m2    Calcium 9.2 8.5 - 10.1 MG/DL   CBC WITH AUTOMATED DIFF    Collection Time: 08/08/20  4:30 AM   Result Value Ref Range    WBC 10.7 4.6 - 13.2 K/uL    RBC 3.92 (L) 4.20 - 5.30 M/uL    HGB 11.9 (L) 12.0 - 16.0 g/dL    HCT 36.2 35.0 - 45.0 %    MCV 92.3 74.0 - 97.0 FL    MCH 30.4 24.0 - 34.0 PG    MCHC 32.9 31.0 - 37.0 g/dL    RDW 14.4 11.6 - 14.5 %    PLATELET 572 210 - 905 K/uL    MPV 8.9 (L) 9.2 - 11.8 FL    NEUTROPHILS 90 (H) 40 - 73 %    LYMPHOCYTES 7 (L) 21 - 52 %    MONOCYTES 3 3 - 10 %    EOSINOPHILS 0 0 - 5 %    BASOPHILS 0 0 - 2 %    ABS. NEUTROPHILS 9.6 (H) 1.8 - 8.0 K/UL    ABS. LYMPHOCYTES 0.8 (L) 0.9 - 3.6 K/UL    ABS. MONOCYTES 0.3 0.05 - 1.2 K/UL    ABS. EOSINOPHILS 0.0 0.0 - 0.4 K/UL    ABS. BASOPHILS 0.0 0.0 - 0.1 K/UL    DF AUTOMATED     POC LACTIC ACID    Collection Time: 08/08/20  7:59 AM   Result Value Ref Range    Lactic Acid (POC) 4.77 (HH) 0.40 - 2.00 mmol/L   GLUCOSE, POC    Collection Time: 08/08/20  8:54 AM   Result Value Ref Range    Glucose (POC) 205 (H) 70 - 110 mg/dL   POC G3    Collection Time: 08/08/20  9:14 AM   Result Value Ref Range    Device: NASAL CANNULA      Flow rate (POC) 3 L/M    FIO2 (POC) 32 %    pH (POC) 7.39 7.35 - 7.45      pCO2 (POC) 38.6 35.0 - 45.0 MMHG    pO2 (POC) 89 80 - 100 MMHG    HCO3 (POC) 23.2 22 - 26 MMOL/L    sO2 (POC) 97 92 - 97 %    Base deficit (POC) 2 mmol/L    Allens test (POC) YES      Total resp. rate 17      Site RIGHT RADIAL      Patient temp. 36.8      Specimen type (POC) ARTERIAL      Performed by Benji Nelson POC    Collection Time: 08/08/20 12:38 PM   Result Value Ref Range    Glucose (POC) 278 (H) 70 - 110 mg/dL       Imaging:  I have personally reviewed the patients radiographs and have reviewed the reports:  XR Results (most recent):  Results from Hospital Encounter encounter on 08/07/20   XR CHEST PORT    Narrative EXAM: XR CHEST PORT    INDICATION: 60 years Female. sob/covid.   ADDITIONAL HISTORY: None.    TECHNIQUE: Frontal view of the chest.    COMPARISON: 7/27/2020    FINDINGS:    The cardiac silhouette is within normal limits in size. There is linear opacities in the peripheral aspect of the bilateral mid and  lower lung zones which are less pronounced than prior. No definite evidence of pleural effusion. No evidence of pneumothorax. Tethering  of the left lateral hemidiaphragm. Osseous structures are unchanged in appearance. Impression IMPRESSION:  Interval improved linear peripheral opacities in the bilateral mid and lower  lung zones, suggesting of scarring although persistent infiltrates are not  excluded. Recommend continued imaging follow-up. CT Results (most recent):  Results from [place] encounter on 07/30/20   CT ABD PELV WO CONT    Addendum Addendum: Addendum:  As mentioned in the initial report but not in impression are extensive peripheral reticular and fibrotic opacities in imaged bilateral lower lung  new since the CT in 4/20 and concerning subacute interstitial pneumonitis. Clinical correlation and chest CT evaluation advised. Amy Lawson MD 7/30/2020 10:38 AM          Narrative CT of abdomen and pelvis without contrast     INDICATION: Increasing pain at the ventral hernia    COMPARISON: CT 4/8/20    TECHNIQUE: 5 mm helical scan to the abdomen and pelvis is obtained  from the  diaphragm to the symphysis pubis without  IV contrast administration. All CT scans at this facility performed using dose optimization techniques as  appreciated to a performed exam, to include automated exposure control,  adjustment of the mA and or KU according to patient size (including appropriate  matching for site specific examination), or use of iterative reconstruction  technique. FINDINGS: The study is suboptimal due to lack of IV contrast.  Subtle  abnormality can be under detected. Lung Bases:  Moderate streaky and reticular opacities identified in imaged  bilateral lower lobes, new since the prior study. Atelectasis in right middle  lobe along the fissure. Prominent pericardial fat pad. Liver: Normal.    Gallbladder: Surgically absent. Biliary System: No ductal dilatation. Spleen: Normal.    Pancreas: Normal.    Adrenal Glands: Normal.    Kidneys: Normal.    Bowel: The stomach is filled with food content within no definite abnormality. The small bowel is mildly dilated with air-fluid levels as well as mild mucosal  thickening, especially in the jejunum. The dilatation is extending to the  terminal ileum with no definite transit point. The colon is nondilated with  scattered diverticula throughout. No diverticulitis. The periumbilical hernia is slightly decreased in size from 6.7 x 7.2 cm to 5.8  x 6.3 cm. The small bowel loop within the hernia sac is no longer evident  instead is abutting the hernia. Although, the mesenteric fat within the hernia  sac appears inflamed. Mild hernia wall thickening and minimal fluid appear  unchanged. Lower genitourinary system: The bladder is poorly distended. The uterus is  surgically absent. Peritoneum/Retroperitoneum: No adenopathy. Vasculature: Moderate atherosclerotic aortic disease. Other: No free fluid. CT OSSEOUS STRUCTURES:       Unremarkable for age. Impression IMPRESSION:     1. The ventral hernia is slightly smaller and the small bowel loop is no longer  seen in the hernia sac but abutting the hernia neck compared to the prior CT in  4/20. The mesenteric within the hernia sac becomes strained, suggesting fatty  inflammation/ischemia. 2. Interval development of mild small bowel dilatation with air-fluid level and  mild mural thickening all the way to the terminal ileum with no evidence of  transit point. The finding could represent enteritis or adynamic ileus. Recommend clinical correlation. 3. Diverticulosis coli.     Thank you for this referral.           05/21/20   ECHO ADULT COMPLETE 05/25/2020 5/25/2020    Narrative · Image quality for this study was technically difficult. Contrast used:   DEFINITY. · Normal cavity size and systolic function (ejection fraction normal). Moderate concentric hypertrophy. Estimated left ventricular ejection   fraction is 65 - 70%. Visually measured ejection fraction. No regional   wall motion abnormality noted. Moderate (grade 2) left ventricular   diastolic dysfunction. · Mildly dilated left atrium. · Pulmonary hypertension. Pulmonary arterial systolic pressure is 61 mmHg. · Severely elevated central venous pressure (15+ mmHg); IVC diameter is   larger than 21 mm and collapses less than 50% with respiration. Signed by: Manda Rocha MD         Complex decision making was made in the evaluation and management plans during this consultation. More than 50% of time was spent in counseling and coordination of care including review of data and discussion with other team members.          Greg Argueta MD

## 2020-08-08 NOTE — PROGRESS NOTES
[facility] Medicine  Progress Note    Patient: Shikha Morales MRN: [id number]   SSN: [id number]  YOB: 1960   Age: 60 y.o. Sex: female      Admit Date: 8/7/2020    LOS: 1 day   Chief Complaint   Patient presents with    Other     Covid positive    Shortness of Breath       Subjective:     Ms. Randy Herrera reports subjective improvement over night. Improved dyspnea; she doesn't feel like she's \"drowning\" anymore. Improved cough, still green sputum. She feels fine on home O2 3L NC. Reports pleuritic CP and subchronic ABD due to umbilical hernia. Denies F/C, NVD, HA, lightheadedness, dizziness, palpitations, bowel complaints, urinary complaints, edema, numbness, or tingling. ROS reported above    Objective:     Visit Vitals  /62   Pulse 96   Temp 98.2 °F (36.8 °C)   Resp 19   Ht 5' 3\" (1.6 m)   Wt 121.1 kg (267 lb)   SpO2 98%   BMI 47.30 kg/m²       Physical Exam:  General: chronically ill-appearnig, NAD  HEENT: Conjunctiva pink, sclera anicteric. CV:  RRR, no M/G/R, radial pulses 2+, no JVD, no cervical lymphadenopathy  RESP: moderately dyspneic, worsened on exertion. Decreased BS diffusely, slight wheeze     ABD:  Soft. umbilical hernia TTP but reducible, Normoactive bowel sounds. MS:  No joint deformity or instability. Neuro: A+Ox3. Ext:  No edema. 2+ radial and dp pulses bilaterally. Skin: warm, dry   Psych: normal mood and affect       Intake and Output:  Current Shift: No intake/output data recorded.   Last three shifts: 08/06 1901 - 08/08 0700  In: 1250 [I.V.:1250]  Out: -     Lab/Data Review:  Recent Results (from the past 12 hour(s))   SED RATE (ESR)    Collection Time: 08/08/20  1:30 AM   Result Value Ref Range    Sed rate, automated 12 0 - 30 mm/hr   FERRITIN    Collection Time: 08/08/20  1:30 AM   Result Value Ref Range    Ferritin 30 8 - 388 NG/ML   D DIMER    Collection Time: 08/08/20  1:30 AM   Result Value Ref Range    D DIMER <0.27 <0.46 ug/ml(FEU) FIBRINOGEN    Collection Time: 08/08/20  1:30 AM   Result Value Ref Range    Fibrinogen 378 210 - 451 mg/dL   PHOSPHORUS    Collection Time: 08/08/20  1:30 AM   Result Value Ref Range    Phosphorus 4.0 2.5 - 4.9 MG/DL   PROCALCITONIN    Collection Time: 08/08/20  1:30 AM   Result Value Ref Range    Procalcitonin <0.05 ng/mL   PTT    Collection Time: 08/08/20  1:30 AM   Result Value Ref Range    aPTT 26.0 23.0 - 36.4 SEC   PROTHROMBIN TIME + INR    Collection Time: 08/08/20  1:30 AM   Result Value Ref Range    Prothrombin time 13.6 11.5 - 15.2 sec    INR 1.1 0.8 - 1.2     MAGNESIUM    Collection Time: 08/08/20  1:30 AM   Result Value Ref Range    Magnesium 1.4 (L) 1.6 - 2.6 mg/dL   METABOLIC PANEL, BASIC    Collection Time: 08/08/20  4:30 AM   Result Value Ref Range    Sodium 137 136 - 145 mmol/L    Potassium 4.4 3.5 - 5.5 mmol/L    Chloride 102 100 - 111 mmol/L    CO2 23 21 - 32 mmol/L    Anion gap 12 3.0 - 18 mmol/L    Glucose 231 (H) 74 - 99 mg/dL    BUN 21 (H) 7.0 - 18 MG/DL    Creatinine 1.08 0.6 - 1.3 MG/DL    BUN/Creatinine ratio 19 12 - 20      GFR est AA >60 >60 ml/min/1.73m2    GFR est non-AA 52 (L) >60 ml/min/1.73m2    Calcium 9.2 8.5 - 10.1 MG/DL   CBC WITH AUTOMATED DIFF    Collection Time: 08/08/20  4:30 AM   Result Value Ref Range    WBC 10.7 4.6 - 13.2 K/uL    RBC 3.92 (L) 4.20 - 5.30 M/uL    HGB 11.9 (L) 12.0 - 16.0 g/dL    HCT 36.2 35.0 - 45.0 %    MCV 92.3 74.0 - 97.0 FL    MCH 30.4 24.0 - 34.0 PG    MCHC 32.9 31.0 - 37.0 g/dL    RDW 14.4 11.6 - 14.5 %    PLATELET 639 590 - 947 K/uL    MPV 8.9 (L) 9.2 - 11.8 FL    NEUTROPHILS 90 (H) 40 - 73 %    LYMPHOCYTES 7 (L) 21 - 52 %    MONOCYTES 3 3 - 10 %    EOSINOPHILS 0 0 - 5 %    BASOPHILS 0 0 - 2 %    ABS. NEUTROPHILS 9.6 (H) 1.8 - 8.0 K/UL    ABS. LYMPHOCYTES 0.8 (L) 0.9 - 3.6 K/UL    ABS. MONOCYTES 0.3 0.05 - 1.2 K/UL    ABS. EOSINOPHILS 0.0 0.0 - 0.4 K/UL    ABS.  BASOPHILS 0.0 0.0 - 0.1 K/UL    DF AUTOMATED     POC LACTIC ACID    Collection Time: 08/08/20  7:59 AM   Result Value Ref Range    Lactic Acid (POC) 4.77 (HH) 0.40 - 2.00 mmol/L   GLUCOSE, POC    Collection Time: 08/08/20  8:54 AM   Result Value Ref Range    Glucose (POC) 205 (H) 70 - 110 mg/dL   POC G3    Collection Time: 08/08/20  9:14 AM   Result Value Ref Range    Device: NASAL CANNULA      Flow rate (POC) 3 L/M    FIO2 (POC) 32 %    pH (POC) 7.39 7.35 - 7.45      pCO2 (POC) 38.6 35.0 - 45.0 MMHG    pO2 (POC) 89 80 - 100 MMHG    HCO3 (POC) 23.2 22 - 26 MMOL/L    sO2 (POC) 97 92 - 97 %    Base deficit (POC) 2 mmol/L    Allens test (POC) YES      Total resp. rate 17      Site RIGHT RADIAL      Patient temp. 36.8      Specimen type (POC) ARTERIAL      Performed by Eva Santana. Results from Hospital Encounter encounter on 08/07/20   XR CHEST PORT    Narrative EXAM: XR CHEST PORT    INDICATION: 60 years Female. sob/covid. ADDITIONAL HISTORY: None. TECHNIQUE: Frontal view of the chest.    COMPARISON: 7/27/2020    FINDINGS:    The cardiac silhouette is within normal limits in size. There is linear opacities in the peripheral aspect of the bilateral mid and  lower lung zones which are less pronounced than prior. No definite evidence of pleural effusion. No evidence of pneumothorax. Tethering  of the left lateral hemidiaphragm. Osseous structures are unchanged in appearance. Impression IMPRESSION:  Interval improved linear peripheral opacities in the bilateral mid and lower  lung zones, suggesting of scarring although persistent infiltrates are not  excluded. Recommend continued imaging follow-up. CT Results from [place] encounter on 07/30/20   CT ABD PELV WO CONT    Addendum Addendum: Addendum:  As mentioned in the initial report but not in impression are extensive peripheral reticular and fibrotic opacities in imaged bilateral lower lung  new since the CT in 4/20 and concerning subacute interstitial pneumonitis.   Clinical correlation and chest CT evaluation advised. Rowena Heck MD 7/30/2020 10:38 AM          Narrative CT of abdomen and pelvis without contrast     INDICATION: Increasing pain at the ventral hernia    COMPARISON: CT 4/8/20    TECHNIQUE: 5 mm helical scan to the abdomen and pelvis is obtained  from the  diaphragm to the symphysis pubis without  IV contrast administration. All CT scans at this facility performed using dose optimization techniques as  appreciated to a performed exam, to include automated exposure control,  adjustment of the mA and or KU according to patient size (including appropriate  matching for site specific examination), or use of iterative reconstruction  technique. FINDINGS: The study is suboptimal due to lack of IV contrast.  Subtle  abnormality can be under detected. Lung Bases: Moderate streaky and reticular opacities identified in imaged  bilateral lower lobes, new since the prior study. Atelectasis in right middle  lobe along the fissure. Prominent pericardial fat pad. Liver: Normal.    Gallbladder: Surgically absent. Biliary System: No ductal dilatation. Spleen: Normal.    Pancreas: Normal.    Adrenal Glands: Normal.    Kidneys: Normal.    Bowel: The stomach is filled with food content within no definite abnormality. The small bowel is mildly dilated with air-fluid levels as well as mild mucosal  thickening, especially in the jejunum. The dilatation is extending to the  terminal ileum with no definite transit point. The colon is nondilated with  scattered diverticula throughout. No diverticulitis. The periumbilical hernia is slightly decreased in size from 6.7 x 7.2 cm to 5.8  x 6.3 cm. The small bowel loop within the hernia sac is no longer evident  instead is abutting the hernia. Although, the mesenteric fat within the hernia  sac appears inflamed. Mild hernia wall thickening and minimal fluid appear  unchanged. Lower genitourinary system:  The bladder is poorly distended. The uterus is  surgically absent. Peritoneum/Retroperitoneum: No adenopathy. Vasculature: Moderate atherosclerotic aortic disease. Other: No free fluid. CT OSSEOUS STRUCTURES:       Unremarkable for age. Impression IMPRESSION:     1. The ventral hernia is slightly smaller and the small bowel loop is no longer  seen in the hernia sac but abutting the hernia neck compared to the prior CT in  4/20. The mesenteric within the hernia sac becomes strained, suggesting fatty  inflammation/ischemia. 2. Interval development of mild small bowel dilatation with air-fluid level and  mild mural thickening all the way to the terminal ileum with no evidence of  transit point. The finding could represent enteritis or adynamic ileus. Recommend clinical correlation. 3. Diverticulosis coli. Thank you for this referral.       MRI No results found for this or any previous visit. ULTRASOUND Results from [place] encounter on 04/10/17   US ABD COMP    Impression IMPRESSION:       1. Echogenic mildly enlarged liver, suggestive of hepatic steatosis. No focal  hepatic lesion identified. 2. Status post cholecystectomy. No biliary ductal dilatation.         Cardiology Procedures/Testing:  MODALITY RESULTS   EKG Results for orders placed or performed during the hospital encounter of 08/07/20   EKG, 12 LEAD, INITIAL   Result Value Ref Range    Ventricular Rate 94 BPM    Atrial Rate 94 BPM    P-R Interval 130 ms    QRS Duration 78 ms    Q-T Interval 346 ms    QTC Calculation (Bezet) 432 ms    Calculated P Axis 52 degrees    Calculated R Axis 13 degrees    Calculated T Axis 59 degrees    Diagnosis       Normal sinus rhythm  Cannot rule out Anterior infarct (cited on or before 22-JUL-2020)  Abnormal ECG  When compared with ECG of 30-JUL-2020 09:34,  No significant change was found  Confirmed by Caren Layne ([id number]) on 8/8/2020 10:32:48 AM         ECHO 05/21/20   ECHO ADULT COMPLETE 05/25/2020 5/25/2020    Narrative · Image quality for this study was technically difficult. Contrast used:   DEFINITY. · Normal cavity size and systolic function (ejection fraction normal). Moderate concentric hypertrophy. Estimated left ventricular ejection   fraction is 65 - 70%. Visually measured ejection fraction. No regional   wall motion abnormality noted. Moderate (grade 2) left ventricular   diastolic dysfunction. · Mildly dilated left atrium. · Pulmonary hypertension. Pulmonary arterial systolic pressure is 61 mmHg. · Severely elevated central venous pressure (15+ mmHg); IVC diameter is   larger than 21 mm and collapses less than 50% with respiration. Signed by: Doron Paulino MD        Special Testing/Procedures:  MODALITY RESULTS   MICRO All Micro Results     Procedure Component Value Units Date/Time    CULTURE, BLOOD [[id number]] Collected:  08/07/20 1510    Order Status:  Completed Specimen:  Blood Updated:  08/08/20 0634     Special Requests: NO SPECIAL REQUESTS        Culture result: NO GROWTH AFTER 14 HOURS       CULTURE, BLOOD [[id number]] Collected:  08/07/20 1525    Order Status:  Completed Specimen:  Blood Updated:  08/08/20 0634     Special Requests: NO SPECIAL REQUESTS        Culture result: NO GROWTH AFTER 13 HOURS       CULTURE, RESPIRATORY/SPUTUM/BRONCH [place] [[id number]]     Order Status:  Sent Specimen:  Sputum          UA No results found for this or any previous visit. Telemetry yes   Oxygen NC     Assessment and Plan:     60 y.o. female with PMH of COPD on home O2 (3L NC), IDDM2, HTN, HFpEF, SHERRIE on CPAP, GERD, allergic rhinitis, recent admission for Covid pneumonia coupled with aute on chronic hypoxic respiratory failure now with increased dyspnea.     Acute on chronic hypoxic respiratory failure likely 2/2 Covid, in the setting of chronic COPD/asthma overlapping syndrome, HFpEF  DDx COPD exacerbation vs COVID pneumonia. Pt w/ positive covid swab (7/7, 7/22).  Pt with severe underlying lung disease, Severe COPD on max therapy w/ strong asthma component. Hospitalized 6x in 2020 for COPD exacerbations. Patients VSS. Sating well on 3 L in the ED however does appear in mod respiratory distress on exertion. Lactic acid increased overnight to 4.77 although does not appear septic. No leukocytosis, although neutrophils 90%. Troponins negative and BNP is at baseline.  CXR today appears improved from recent discharge. She does not appear fluid overloaded, no lower extremity edema concerning for CHF exacerbation.  ABG unremarkable; pCO2 38.6.   - Pulm consulted, FU recs  - duonebs q4 hours   - Continue Advair and spiriva  - solumedrol 60mg q12h    - albuterol prn   - hold home bronchodilators   - Antibiotic: zosyn to cover pseudomonas  - Continue home azithromycin M, W, F   - FU COVID  - Daily CBC, BMP   - Lovenox 40 mg for DVT prophylaxis   - Oxygen with goal of 88-92%.   - Vital signs per unit routine  - Monitor I/Os  - PT/OT/CM     Insulin dependent Type 2 Diabetes  Home lantus 45u pm and novolog SSI. -Continue Lantus at 45 U; may increase since BG has been in 250s, but steroids also contribute to hyperglycemia. -SSI   -Accuchecks ACHS  -Diabetic diet  -Diabetic educator consult     Hypertension, HFpEF: ECHO (5/24/20) OM 37 - 70%. No regional wall motion abnormality. Moderate (grade 2) left ventricular diastolic dysfunction. Mildly dilated left atrium. Pulmonary hypertension. Pulmonary arterial systolic pressure is 61 mmHg. Severely elevated central venous pressure (15+ mmHg); IVC diameter is larger than 21 mm and collapses less than 50% with respiration. SBP in ED elevated  to 129s-139s.  Pt on lisinopril 20mg BID and HCTZ 12.5mg daily at home.  - Continue Lasix 20mg daily  - Continue Lisinopril 20mg BID  - Continue HCTZ 12.5 mg daily     GERD: Currently asymptomatic   -Continue omeprazole 40mg daily     Morbid obesity  -Diabetic diet       Diet diabetic   DVT Prophylaxis lovenox   GI Prophylaxis omeprazola   Code status Full   Disposition > 2 MN     Point of Contact Marciano Argueta  Relationship: Daughter  [phone]     Campbell Moreno MD, PGY1   [address]   Intern Pager: [phone]   August 8, 2020, 11:15 AM

## 2020-08-08 NOTE — ED NOTES
RT called for PRN night time BiPAP per pt request. IV placed for evening labs. Pt tolerated well. Labs sent per order. Pt resting comfortably without complaints of pain. Pt updated on plan of care. Admission pending bed assignment.

## 2020-08-09 LAB
ANION GAP SERPL CALC-SCNC: 8 MMOL/L (ref 3–18)
ATRIAL RATE: 80 BPM
BASOPHILS # BLD: 0 K/UL (ref 0–0.1)
BASOPHILS NFR BLD: 0 % (ref 0–2)
BUN SERPL-MCNC: 20 MG/DL (ref 7–18)
BUN/CREAT SERPL: 19 (ref 12–20)
CALCIUM SERPL-MCNC: 9.3 MG/DL (ref 8.5–10.1)
CALCULATED P AXIS, ECG09: 115 DEGREES
CALCULATED T AXIS, ECG11: 108 DEGREES
CHLORIDE SERPL-SCNC: 101 MMOL/L (ref 100–111)
CO2 SERPL-SCNC: 27 MMOL/L (ref 21–32)
CREAT SERPL-MCNC: 1.03 MG/DL (ref 0.6–1.3)
DIAGNOSIS, 93000: NORMAL
DIFFERENTIAL METHOD BLD: ABNORMAL
EOSINOPHIL # BLD: 0 K/UL (ref 0–0.4)
EOSINOPHIL NFR BLD: 0 % (ref 0–5)
ERYTHROCYTE [DISTWIDTH] IN BLOOD BY AUTOMATED COUNT: 14.2 % (ref 11.6–14.5)
GLUCOSE BLD STRIP.AUTO-MCNC: 197 MG/DL (ref 70–110)
GLUCOSE BLD STRIP.AUTO-MCNC: 218 MG/DL (ref 70–110)
GLUCOSE BLD STRIP.AUTO-MCNC: 284 MG/DL (ref 70–110)
GLUCOSE BLD STRIP.AUTO-MCNC: 293 MG/DL (ref 70–110)
GLUCOSE SERPL-MCNC: 234 MG/DL (ref 74–99)
HCT VFR BLD AUTO: 36.1 % (ref 35–45)
HGB BLD-MCNC: 11.7 G/DL (ref 12–16)
LACTATE BLD-SCNC: 5.81 MMOL/L (ref 0.4–2)
LYMPHOCYTES # BLD: 0.9 K/UL (ref 0.9–3.6)
LYMPHOCYTES NFR BLD: 9 % (ref 21–52)
MCH RBC QN AUTO: 30.2 PG (ref 24–34)
MCHC RBC AUTO-ENTMCNC: 32.4 G/DL (ref 31–37)
MCV RBC AUTO: 93.3 FL (ref 74–97)
MONOCYTES # BLD: 0.5 K/UL (ref 0.05–1.2)
MONOCYTES NFR BLD: 5 % (ref 3–10)
NEUTS SEG # BLD: 8.9 K/UL (ref 1.8–8)
NEUTS SEG NFR BLD: 86 % (ref 40–73)
P-R INTERVAL, ECG05: 142 MS
PLATELET # BLD AUTO: 258 K/UL (ref 135–420)
PMV BLD AUTO: 9.1 FL (ref 9.2–11.8)
POTASSIUM SERPL-SCNC: 4.3 MMOL/L (ref 3.5–5.5)
Q-T INTERVAL, ECG07: 378 MS
QRS DURATION, ECG06: 90 MS
QTC CALCULATION (BEZET), ECG08: 435 MS
RBC # BLD AUTO: 3.87 M/UL (ref 4.2–5.3)
SODIUM SERPL-SCNC: 136 MMOL/L (ref 136–145)
VENTRICULAR RATE, ECG03: 80 BPM
WBC # BLD AUTO: 10.3 K/UL (ref 4.6–13.2)

## 2020-08-09 PROCEDURE — 74011250637 HC RX REV CODE- 250/637: Performed by: STUDENT IN AN ORGANIZED HEALTH CARE EDUCATION/TRAINING PROGRAM

## 2020-08-09 PROCEDURE — 94640 AIRWAY INHALATION TREATMENT: CPT

## 2020-08-09 PROCEDURE — 3331090002 HH PPS REVENUE DEBIT

## 2020-08-09 PROCEDURE — 94660 CPAP INITIATION&MGMT: CPT

## 2020-08-09 PROCEDURE — 80048 BASIC METABOLIC PNL TOTAL CA: CPT

## 2020-08-09 PROCEDURE — 82962 GLUCOSE BLOOD TEST: CPT

## 2020-08-09 PROCEDURE — 85025 COMPLETE CBC W/AUTO DIFF WBC: CPT

## 2020-08-09 PROCEDURE — 74011250636 HC RX REV CODE- 250/636: Performed by: STUDENT IN AN ORGANIZED HEALTH CARE EDUCATION/TRAINING PROGRAM

## 2020-08-09 PROCEDURE — 74011000258 HC RX REV CODE- 258: Performed by: STUDENT IN AN ORGANIZED HEALTH CARE EDUCATION/TRAINING PROGRAM

## 2020-08-09 PROCEDURE — 77030013140 HC MSK NEB VYRM -A

## 2020-08-09 PROCEDURE — 74011250636 HC RX REV CODE- 250/636: Performed by: FAMILY MEDICINE

## 2020-08-09 PROCEDURE — 3331090001 HH PPS REVENUE CREDIT

## 2020-08-09 PROCEDURE — 74011636637 HC RX REV CODE- 636/637: Performed by: STUDENT IN AN ORGANIZED HEALTH CARE EDUCATION/TRAINING PROGRAM

## 2020-08-09 PROCEDURE — 74011000250 HC RX REV CODE- 250: Performed by: STUDENT IN AN ORGANIZED HEALTH CARE EDUCATION/TRAINING PROGRAM

## 2020-08-09 PROCEDURE — 83605 ASSAY OF LACTIC ACID: CPT

## 2020-08-09 PROCEDURE — 65610000006 HC RM INTENSIVE CARE

## 2020-08-09 RX ORDER — INSULIN GLARGINE 100 [IU]/ML
50 INJECTION, SOLUTION SUBCUTANEOUS
Status: DISCONTINUED | OUTPATIENT
Start: 2020-08-09 | End: 2020-08-10

## 2020-08-09 RX ORDER — ENOXAPARIN SODIUM 100 MG/ML
40 INJECTION SUBCUTANEOUS EVERY 12 HOURS
Status: DISCONTINUED | OUTPATIENT
Start: 2020-08-09 | End: 2020-08-10

## 2020-08-09 RX ADMIN — LEVOFLOXACIN 750 MG: 5 INJECTION INTRAVENOUS at 18:35

## 2020-08-09 RX ADMIN — IPRATROPIUM BROMIDE AND ALBUTEROL SULFATE 3 ML: .5; 3 SOLUTION RESPIRATORY (INHALATION) at 15:57

## 2020-08-09 RX ADMIN — INSULIN LISPRO 9 UNITS: 100 INJECTION, SOLUTION INTRAVENOUS; SUBCUTANEOUS at 11:59

## 2020-08-09 RX ADMIN — IPRATROPIUM BROMIDE AND ALBUTEROL SULFATE 3 ML: .5; 3 SOLUTION RESPIRATORY (INHALATION) at 08:40

## 2020-08-09 RX ADMIN — Medication 5 ML: at 22:25

## 2020-08-09 RX ADMIN — INSULIN LISPRO 9 UNITS: 100 INJECTION, SOLUTION INTRAVENOUS; SUBCUTANEOUS at 17:34

## 2020-08-09 RX ADMIN — IPRATROPIUM BROMIDE AND ALBUTEROL SULFATE 3 ML: .5; 3 SOLUTION RESPIRATORY (INHALATION) at 19:44

## 2020-08-09 RX ADMIN — MONTELUKAST 10 MG: 10 TABLET, FILM COATED ORAL at 22:25

## 2020-08-09 RX ADMIN — Medication 10 ML: at 14:00

## 2020-08-09 RX ADMIN — PIPERACILLIN SODIUM AND TAZOBACTAM SODIUM 4.5 G: 4; .5 INJECTION, POWDER, LYOPHILIZED, FOR SOLUTION INTRAVENOUS at 15:57

## 2020-08-09 RX ADMIN — PANTOPRAZOLE 40 MG: 40 TABLET, DELAYED RELEASE ORAL at 08:39

## 2020-08-09 RX ADMIN — INSULIN LISPRO 6 UNITS: 100 INJECTION, SOLUTION INTRAVENOUS; SUBCUTANEOUS at 08:58

## 2020-08-09 RX ADMIN — METHYLPREDNISOLONE SODIUM SUCCINATE 60 MG: 40 INJECTION, POWDER, FOR SOLUTION INTRAMUSCULAR; INTRAVENOUS at 20:25

## 2020-08-09 RX ADMIN — FUROSEMIDE 20 MG: 20 TABLET ORAL at 08:40

## 2020-08-09 RX ADMIN — IPRATROPIUM BROMIDE AND ALBUTEROL SULFATE 3 ML: .5; 3 SOLUTION RESPIRATORY (INHALATION) at 23:45

## 2020-08-09 RX ADMIN — INSULIN GLARGINE 50 UNITS: 100 INJECTION, SOLUTION SUBCUTANEOUS at 22:35

## 2020-08-09 RX ADMIN — ENOXAPARIN SODIUM 40 MG: 40 INJECTION SUBCUTANEOUS at 18:35

## 2020-08-09 RX ADMIN — IPRATROPIUM BROMIDE AND ALBUTEROL SULFATE 3 ML: .5; 3 SOLUTION RESPIRATORY (INHALATION) at 11:38

## 2020-08-09 RX ADMIN — IPRATROPIUM BROMIDE AND ALBUTEROL SULFATE 3 ML: .5; 3 SOLUTION RESPIRATORY (INHALATION) at 03:44

## 2020-08-09 RX ADMIN — PIPERACILLIN SODIUM AND TAZOBACTAM SODIUM 4.5 G: 4; .5 INJECTION, POWDER, LYOPHILIZED, FOR SOLUTION INTRAVENOUS at 03:44

## 2020-08-09 RX ADMIN — Medication 10 ML: at 06:00

## 2020-08-09 RX ADMIN — HYDROCHLOROTHIAZIDE 12.5 MG: 25 TABLET ORAL at 08:39

## 2020-08-09 RX ADMIN — LISINOPRIL 20 MG: 20 TABLET ORAL at 08:40

## 2020-08-09 RX ADMIN — PIPERACILLIN SODIUM AND TAZOBACTAM SODIUM 4.5 G: 4; .5 INJECTION, POWDER, LYOPHILIZED, FOR SOLUTION INTRAVENOUS at 08:56

## 2020-08-09 RX ADMIN — METHYLPREDNISOLONE SODIUM SUCCINATE 60 MG: 40 INJECTION, POWDER, FOR SOLUTION INTRAMUSCULAR; INTRAVENOUS at 08:40

## 2020-08-09 RX ADMIN — PIPERACILLIN SODIUM AND TAZOBACTAM SODIUM 4.5 G: 4; .5 INJECTION, POWDER, LYOPHILIZED, FOR SOLUTION INTRAVENOUS at 20:25

## 2020-08-09 RX ADMIN — FLUTICASONE PROPIONATE 2 PUFF: 110 AEROSOL, METERED RESPIRATORY (INHALATION) at 08:00

## 2020-08-09 RX ADMIN — ASPIRIN 81 MG: 81 TABLET, COATED ORAL at 08:40

## 2020-08-09 RX ADMIN — ENOXAPARIN SODIUM 40 MG: 40 INJECTION SUBCUTANEOUS at 08:39

## 2020-08-09 RX ADMIN — INSULIN LISPRO 3 UNITS: 100 INJECTION, SOLUTION INTRAVENOUS; SUBCUTANEOUS at 22:35

## 2020-08-09 NOTE — PROGRESS NOTES
[facility]  Progress Note    Patient: Arnulfo Ruth MRN: [id number]   SSN: [id number]  YOB: 1960   Age: 60 y.o. Sex: female      Admit Date: 8/7/2020    LOS: 2 days   Chief Complaint   Patient presents with    Other     Covid positive    Shortness of Breath       Subjective:     No acute events overnight. She is continuing to show improvement but per night team, she was uncomfortable going home yesterday. She continues to cough, sputum color is yellow with an occasional green tinge. She denies any SOB at rest on home 3L O2, but gets SOB when sitting up from the bed and standing. She has moved herself out of bed multiple times yesterday to go to the potty independently, felt SOB, weak and dizzy doing so. Has some chest pain with continued coughing, denies palpitations. Last BM was 2 days ago. No urinary complaints. ROS as noted above    Objective:     Visit Vitals  /56   Pulse 83   Temp 98.5 °F (36.9 °C)   Resp 21   Ht 5' 3\" (1.6 m)   Wt 121.1 kg (267 lb)   SpO2 98%   BMI 47.30 kg/m²       Physical Exam:  General: chronically ill-appearnig, NAD, alert  HEENT: Conjunctiva pink, sclera anicteric. CV:  RRR, no M/G/R, radial pulses 2+,  RESP: Non labored breathing on 3L NC, tachypeic on exertion. Decreased BS diffusely, wheezes heard on the R.     ABD:  Soft. umbilical hernia TTP but reducible, Normoactive bowel sounds. MS:  No joint deformity or instability. Neuro: A+Ox3. Ext:  No edema. 2+ radial and dp pulses bilaterally. Sensation intact in the LE bilaterally  Skin: warm, dry   Psych: normal mood and affect       Intake and Output:  Current Shift: No intake/output data recorded.   Last three shifts: 08/07 1901 - 08/09 0700  In: 1250 [I.V.:1250]  Out: -     Lab/Data Review:  Recent Results (from the past 12 hour(s))   GLUCOSE, POC    Collection Time: 08/08/20  9:56 PM   Result Value Ref Range    Glucose (POC) 166 (H) 70 - 975 mg/dL   METABOLIC PANEL, BASIC    Collection Time: 08/09/20  4:00 AM   Result Value Ref Range    Sodium 136 136 - 145 mmol/L    Potassium 4.3 3.5 - 5.5 mmol/L    Chloride 101 100 - 111 mmol/L    CO2 27 21 - 32 mmol/L    Anion gap 8 3.0 - 18 mmol/L    Glucose 234 (H) 74 - 99 mg/dL    BUN 20 (H) 7.0 - 18 MG/DL    Creatinine 1.03 0.6 - 1.3 MG/DL    BUN/Creatinine ratio 19 12 - 20      GFR est AA >60 >60 ml/min/1.73m2    GFR est non-AA 55 (L) >60 ml/min/1.73m2    Calcium 9.3 8.5 - 10.1 MG/DL   CBC WITH AUTOMATED DIFF    Collection Time: 08/09/20  4:00 AM   Result Value Ref Range    WBC 10.3 4.6 - 13.2 K/uL    RBC 3.87 (L) 4.20 - 5.30 M/uL    HGB 11.7 (L) 12.0 - 16.0 g/dL    HCT 36.1 35.0 - 45.0 %    MCV 93.3 74.0 - 97.0 FL    MCH 30.2 24.0 - 34.0 PG    MCHC 32.4 31.0 - 37.0 g/dL    RDW 14.2 11.6 - 14.5 %    PLATELET 560 894 - 979 K/uL    MPV 9.1 (L) 9.2 - 11.8 FL    NEUTROPHILS 86 (H) 40 - 73 %    LYMPHOCYTES 9 (L) 21 - 52 %    MONOCYTES 5 3 - 10 %    EOSINOPHILS 0 0 - 5 %    BASOPHILS 0 0 - 2 %    ABS. NEUTROPHILS 8.9 (H) 1.8 - 8.0 K/UL    ABS. LYMPHOCYTES 0.9 0.9 - 3.6 K/UL    ABS. MONOCYTES 0.5 0.05 - 1.2 K/UL    ABS. EOSINOPHILS 0.0 0.0 - 0.4 K/UL    ABS. BASOPHILS 0.0 0.0 - 0.1 K/UL    DF AUTOMATED         RECENT RESULTS  MODALITY IMPRESSION   XR Results from Hospital Encounter encounter on 08/07/20   XR CHEST PORT    Narrative EXAM: XR CHEST PORT    INDICATION: 60 years Female. sob/covid. ADDITIONAL HISTORY: None. TECHNIQUE: Frontal view of the chest.    COMPARISON: 7/27/2020    FINDINGS:    The cardiac silhouette is within normal limits in size. There is linear opacities in the peripheral aspect of the bilateral mid and  lower lung zones which are less pronounced than prior. No definite evidence of pleural effusion. No evidence of pneumothorax. Tethering  of the left lateral hemidiaphragm. Osseous structures are unchanged in appearance.       Impression IMPRESSION:  Interval improved linear peripheral opacities in the bilateral mid and lower  lung zones, suggesting of scarring although persistent infiltrates are not  excluded. Recommend continued imaging follow-up. CT Results from [place] encounter on 07/30/20   CT ABD PELV WO CONT    Addendum Addendum: Addendum:  As mentioned in the initial report but not in impression are extensive peripheral reticular and fibrotic opacities in imaged bilateral lower lung  new since the CT in 4/20 and concerning subacute interstitial pneumonitis. Clinical correlation and chest CT evaluation advised. Odalys Gutierres MD 7/30/2020 10:38 AM          Narrative CT of abdomen and pelvis without contrast     INDICATION: Increasing pain at the ventral hernia    COMPARISON: CT 4/8/20    TECHNIQUE: 5 mm helical scan to the abdomen and pelvis is obtained  from the  diaphragm to the symphysis pubis without  IV contrast administration. All CT scans at this facility performed using dose optimization techniques as  appreciated to a performed exam, to include automated exposure control,  adjustment of the mA and or KU according to patient size (including appropriate  matching for site specific examination), or use of iterative reconstruction  technique. FINDINGS: The study is suboptimal due to lack of IV contrast.  Subtle  abnormality can be under detected. Lung Bases: Moderate streaky and reticular opacities identified in imaged  bilateral lower lobes, new since the prior study. Atelectasis in right middle  lobe along the fissure. Prominent pericardial fat pad. Liver: Normal.    Gallbladder: Surgically absent. Biliary System: No ductal dilatation. Spleen: Normal.    Pancreas: Normal.    Adrenal Glands: Normal.    Kidneys: Normal.    Bowel: The stomach is filled with food content within no definite abnormality. The small bowel is mildly dilated with air-fluid levels as well as mild mucosal  thickening, especially in the jejunum.  The dilatation is extending to the  terminal ileum with no definite transit point. The colon is nondilated with  scattered diverticula throughout. No diverticulitis. The periumbilical hernia is slightly decreased in size from 6.7 x 7.2 cm to 5.8  x 6.3 cm. The small bowel loop within the hernia sac is no longer evident  instead is abutting the hernia. Although, the mesenteric fat within the hernia  sac appears inflamed. Mild hernia wall thickening and minimal fluid appear  unchanged. Lower genitourinary system: The bladder is poorly distended. The uterus is  surgically absent. Peritoneum/Retroperitoneum: No adenopathy. Vasculature: Moderate atherosclerotic aortic disease. Other: No free fluid. CT OSSEOUS STRUCTURES:       Unremarkable for age. Impression IMPRESSION:     1. The ventral hernia is slightly smaller and the small bowel loop is no longer  seen in the hernia sac but abutting the hernia neck compared to the prior CT in  4/20. The mesenteric within the hernia sac becomes strained, suggesting fatty  inflammation/ischemia. 2. Interval development of mild small bowel dilatation with air-fluid level and  mild mural thickening all the way to the terminal ileum with no evidence of  transit point. The finding could represent enteritis or adynamic ileus. Recommend clinical correlation. 3. Diverticulosis coli. Thank you for this referral.       MRI No results found for this or any previous visit. ULTRASOUND Results from [place] encounter on 04/10/17   US ABD COMP    Impression IMPRESSION:       1. Echogenic mildly enlarged liver, suggestive of hepatic steatosis. No focal  hepatic lesion identified. 2. Status post cholecystectomy. No biliary ductal dilatation.         Cardiology Procedures/Testing:  MODALITY RESULTS   EKG Results for orders placed or performed during the hospital encounter of 08/07/20   EKG, 12 LEAD, INITIAL   Result Value Ref Range    Ventricular Rate 80 BPM    Atrial Rate 80 BPM    P-R Interval 142 ms    QRS Duration 90 ms    Q-T Interval 378 ms    QTC Calculation (Bezet) 435 ms    Calculated P Axis 115 degrees    Calculated T Axis 108 degrees    Diagnosis       Sinus rhythm with premature atrial complexes  Low voltage QRS  Cannot rule out Anterior infarct (cited on or before 22-JUL-2020)  T wave abnormality, consider lateral ischemia  Abnormal ECG  When compared with ECG of 07-AUG-2020 15:24,  premature atrial complexes are now present         ECHO 05/21/20   ECHO ADULT COMPLETE 05/25/2020 5/25/2020    Narrative · Image quality for this study was technically difficult. Contrast used:   DEFINITY. · Normal cavity size and systolic function (ejection fraction normal). Moderate concentric hypertrophy. Estimated left ventricular ejection   fraction is 65 - 70%. Visually measured ejection fraction. No regional   wall motion abnormality noted. Moderate (grade 2) left ventricular   diastolic dysfunction. · Mildly dilated left atrium. · Pulmonary hypertension. Pulmonary arterial systolic pressure is 61 mmHg. · Severely elevated central venous pressure (15+ mmHg); IVC diameter is   larger than 21 mm and collapses less than 50% with respiration. Signed by: Jayant Caicedo MD        Special Testing/Procedures:  MODALITY RESULTS   MICRO All Micro Results     Procedure Component Value Units Date/Time    CULTURE, BLOOD [[id number]] Collected:  08/07/20 1510    Order Status:  Completed Specimen:  Blood Updated:  08/09/20 0635     Special Requests: NO SPECIAL REQUESTS        Culture result: NO GROWTH 2 DAYS       CULTURE, BLOOD [[id number]] Collected:  08/07/20 1525    Order Status:  Completed Specimen:  Blood Updated:  08/09/20 0635     Special Requests: NO SPECIAL REQUESTS        Culture result: NO GROWTH 2 DAYS       CULTURE, RESPIRATORY/SPUTUM/BRONCH Versa Juno [[id number]]     Order Status:  Sent Specimen:  Sputum          UA No results found for this or any previous visit.      Telemetry yes   Oxygen NC Assessment and Plan:     60 y.o. female with PMH of COPD on home O2 (3L NC), IDDM2, HTN, HFpEF, SHERRIE on CPAP, GERD, allergic rhinitis, recent admission for Covid pneumonia coupled with aute on chronic hypoxic respiratory failure now with increased dyspnea.     Acute COPD Exacerbation. Asthma overlap syndrome. Pt w/ hx of positive covid swab (7/7, 7/22), but negative on admission (8/7). S/p Remdesivir and Convalescnet plasma. Patient has severe COPD on max therapy w/ strong asthma component. Hospitalized 6x in 2020 for COPD exacerbations. Likely has a superimposed lung infection given elevated lactic acid at 4.77, green sputum. No leukocytosis, although neutrophils 90%. Pending sputum culture, is on Levaquin, Zosyn. No cardiac concerns as troponins were negative and BNP is at baseline,  no lower extremity edema.  CXR yesterday shows interval improved linear peripheral opacities in the bilateral mid and lower lung zones.   - Pulm following, appreciate recs  - duonebs q4 hours   - continue Advair and spiriva  - solumedrol 60mg q12h    - albuterol prn   - hold home bronchodilators   - Antibiotic: zosyn to cover pseudomonas  - Continue home azithromycin M, W, F   - FU COVID  - Daily CBC, BMP   - Lovenox 40 mg for DVT prophylaxis . Will increase to BID per pulm rec  - Oxygen with goal of 88-92%. Please continue monitoring SpO2  - Vital signs per unit routine  - Monitor I/Os  - PT/OT/CM     Insulin dependent Type 2 Diabetes  Home lantus 45u pm and novolog SSI. -Continue Lantus at 45 U; may increase since BG has been in 250s, but steroids also contribute to hyperglycemia. -SSI   -Accuchecks ACHS  -Diabetic diet  -Diabetic educator consult     Hypertension, HFpEF: ECHO (5/24/20) HU 61 - 70%. No regional wall motion abnormality. Moderate (grade 2) left ventricular diastolic dysfunction. Mildly dilated left atrium. Pulmonary hypertension. Pulmonary arterial systolic pressure is 61 mmHg.  Severely elevated central venous pressure (15+ mmHg); IVC diameter is larger than 21 mm and collapses less than 50% with respiration. SBP in ED elevated  to 129s-139s.  Pt on lisinopril 20mg BID and HCTZ 12.5mg daily at home.  - Continue Lasix 20mg daily  - Continue Lisinopril 20mg BID  - Continue HCTZ 12.5 mg daily     GERD: Currently asymptomatic   -Continue omeprazole 40mg daily     Morbid obesity  -Diabetic diet       Diet diabetic   DVT Prophylaxis lovenox   GI Prophylaxis omeprazole   Code status Full   Disposition 1 [place], Home with [address] [facility] [address] of Contact [place]  Relationship: Daughter  [phone]     Darell Massey MD, PGY1   [address]   Intern Pager: [phone]   August 9, 2020, 11:15 AM

## 2020-08-09 NOTE — ED NOTES
Bedside and verbal shift report given by Camila Bal RN (off going nurse) to Theresa Dubose RN (oncoming nurse). Report included the following information SBAR and ED Summary.

## 2020-08-10 LAB
ANION GAP SERPL CALC-SCNC: 6 MMOL/L (ref 3–18)
BASOPHILS # BLD: 0 K/UL (ref 0–0.1)
BASOPHILS NFR BLD: 0 % (ref 0–2)
BUN SERPL-MCNC: 22 MG/DL (ref 7–18)
BUN/CREAT SERPL: 20 (ref 12–20)
CALCIUM SERPL-MCNC: 9.4 MG/DL (ref 8.5–10.1)
CHLORIDE SERPL-SCNC: 99 MMOL/L (ref 100–111)
CO2 SERPL-SCNC: 30 MMOL/L (ref 21–32)
CREAT SERPL-MCNC: 1.1 MG/DL (ref 0.6–1.3)
DIFFERENTIAL METHOD BLD: ABNORMAL
EOSINOPHIL # BLD: 0 K/UL (ref 0–0.4)
EOSINOPHIL NFR BLD: 0 % (ref 0–5)
ERYTHROCYTE [DISTWIDTH] IN BLOOD BY AUTOMATED COUNT: 14.3 % (ref 11.6–14.5)
GLUCOSE BLD STRIP.AUTO-MCNC: 182 MG/DL (ref 70–110)
GLUCOSE BLD STRIP.AUTO-MCNC: 208 MG/DL (ref 70–110)
GLUCOSE BLD STRIP.AUTO-MCNC: 208 MG/DL (ref 70–110)
GLUCOSE BLD STRIP.AUTO-MCNC: 276 MG/DL (ref 70–110)
GLUCOSE SERPL-MCNC: 249 MG/DL (ref 74–99)
HCT VFR BLD AUTO: 34.4 % (ref 35–45)
HGB BLD-MCNC: 11.4 G/DL (ref 12–16)
LACTATE SERPL-SCNC: 4.8 MMOL/L (ref 0.4–2)
LYMPHOCYTES # BLD: 0.8 K/UL (ref 0.9–3.6)
LYMPHOCYTES NFR BLD: 9 % (ref 21–52)
MCH RBC QN AUTO: 31.1 PG (ref 24–34)
MCHC RBC AUTO-ENTMCNC: 33.1 G/DL (ref 31–37)
MCV RBC AUTO: 93.7 FL (ref 74–97)
MONOCYTES # BLD: 0.4 K/UL (ref 0.05–1.2)
MONOCYTES NFR BLD: 4 % (ref 3–10)
NEUTS SEG # BLD: 8.4 K/UL (ref 1.8–8)
NEUTS SEG NFR BLD: 87 % (ref 40–73)
PLATELET # BLD AUTO: 268 K/UL (ref 135–420)
PMV BLD AUTO: 9.2 FL (ref 9.2–11.8)
POTASSIUM SERPL-SCNC: 4.5 MMOL/L (ref 3.5–5.5)
RBC # BLD AUTO: 3.67 M/UL (ref 4.2–5.3)
SODIUM SERPL-SCNC: 135 MMOL/L (ref 136–145)
WBC # BLD AUTO: 9.6 K/UL (ref 4.6–13.2)

## 2020-08-10 PROCEDURE — 94660 CPAP INITIATION&MGMT: CPT

## 2020-08-10 PROCEDURE — 97165 OT EVAL LOW COMPLEX 30 MIN: CPT

## 2020-08-10 PROCEDURE — 97535 SELF CARE MNGMENT TRAINING: CPT

## 2020-08-10 PROCEDURE — 74011250637 HC RX REV CODE- 250/637: Performed by: STUDENT IN AN ORGANIZED HEALTH CARE EDUCATION/TRAINING PROGRAM

## 2020-08-10 PROCEDURE — 74011636637 HC RX REV CODE- 636/637: Performed by: STUDENT IN AN ORGANIZED HEALTH CARE EDUCATION/TRAINING PROGRAM

## 2020-08-10 PROCEDURE — 97530 THERAPEUTIC ACTIVITIES: CPT

## 2020-08-10 PROCEDURE — 77010033678 HC OXYGEN DAILY

## 2020-08-10 PROCEDURE — 74011250636 HC RX REV CODE- 250/636: Performed by: FAMILY MEDICINE

## 2020-08-10 PROCEDURE — 94640 AIRWAY INHALATION TREATMENT: CPT

## 2020-08-10 PROCEDURE — 36415 COLL VENOUS BLD VENIPUNCTURE: CPT

## 2020-08-10 PROCEDURE — 85025 COMPLETE CBC W/AUTO DIFF WBC: CPT

## 2020-08-10 PROCEDURE — 74011250637 HC RX REV CODE- 250/637: Performed by: HOSPITALIST

## 2020-08-10 PROCEDURE — 74011250636 HC RX REV CODE- 250/636: Performed by: STUDENT IN AN ORGANIZED HEALTH CARE EDUCATION/TRAINING PROGRAM

## 2020-08-10 PROCEDURE — 65610000006 HC RM INTENSIVE CARE

## 2020-08-10 PROCEDURE — 74011000258 HC RX REV CODE- 258: Performed by: STUDENT IN AN ORGANIZED HEALTH CARE EDUCATION/TRAINING PROGRAM

## 2020-08-10 PROCEDURE — 97161 PT EVAL LOW COMPLEX 20 MIN: CPT

## 2020-08-10 PROCEDURE — 3331090001 HH PPS REVENUE CREDIT

## 2020-08-10 PROCEDURE — 65270000029 HC RM PRIVATE

## 2020-08-10 PROCEDURE — 80048 BASIC METABOLIC PNL TOTAL CA: CPT

## 2020-08-10 PROCEDURE — 94762 N-INVAS EAR/PLS OXIMTRY CONT: CPT

## 2020-08-10 PROCEDURE — 83605 ASSAY OF LACTIC ACID: CPT

## 2020-08-10 PROCEDURE — 3331090002 HH PPS REVENUE DEBIT

## 2020-08-10 PROCEDURE — 82962 GLUCOSE BLOOD TEST: CPT

## 2020-08-10 RX ORDER — PREDNISONE 20 MG/1
40 TABLET ORAL DAILY
Status: DISCONTINUED | OUTPATIENT
Start: 2020-08-11 | End: 2020-08-12 | Stop reason: HOSPADM

## 2020-08-10 RX ORDER — INSULIN GLARGINE 100 [IU]/ML
55 INJECTION, SOLUTION SUBCUTANEOUS
Status: DISCONTINUED | OUTPATIENT
Start: 2020-08-10 | End: 2020-08-11

## 2020-08-10 RX ORDER — ALBUTEROL SULFATE 90 UG/1
1 AEROSOL, METERED RESPIRATORY (INHALATION)
Status: DISCONTINUED | OUTPATIENT
Start: 2020-08-10 | End: 2020-08-10

## 2020-08-10 RX ORDER — ENOXAPARIN SODIUM 100 MG/ML
40 INJECTION SUBCUTANEOUS EVERY 12 HOURS
Status: DISCONTINUED | OUTPATIENT
Start: 2020-08-10 | End: 2020-08-11

## 2020-08-10 RX ORDER — SODIUM CHLORIDE 0.9 % (FLUSH) 0.9 %
5-40 SYRINGE (ML) INJECTION AS NEEDED
Status: DISCONTINUED | OUTPATIENT
Start: 2020-08-10 | End: 2020-08-12 | Stop reason: HOSPADM

## 2020-08-10 RX ADMIN — INSULIN LISPRO 6 UNITS: 100 INJECTION, SOLUTION INTRAVENOUS; SUBCUTANEOUS at 11:30

## 2020-08-10 RX ADMIN — IPRATROPIUM BROMIDE AND ALBUTEROL 1 PUFF: 20; 100 SPRAY, METERED RESPIRATORY (INHALATION) at 16:00

## 2020-08-10 RX ADMIN — INSULIN LISPRO 3 UNITS: 100 INJECTION, SOLUTION INTRAVENOUS; SUBCUTANEOUS at 09:02

## 2020-08-10 RX ADMIN — LEVOFLOXACIN 750 MG: 5 INJECTION INTRAVENOUS at 21:08

## 2020-08-10 RX ADMIN — PANTOPRAZOLE 40 MG: 40 TABLET, DELAYED RELEASE ORAL at 07:30

## 2020-08-10 RX ADMIN — IPRATROPIUM BROMIDE AND ALBUTEROL 1 PUFF: 20; 100 SPRAY, METERED RESPIRATORY (INHALATION) at 12:00

## 2020-08-10 RX ADMIN — LISINOPRIL 20 MG: 20 TABLET ORAL at 09:00

## 2020-08-10 RX ADMIN — FLUTICASONE PROPIONATE 2 PUFF: 110 AEROSOL, METERED RESPIRATORY (INHALATION) at 19:30

## 2020-08-10 RX ADMIN — ASPIRIN 81 MG: 81 TABLET, COATED ORAL at 09:00

## 2020-08-10 RX ADMIN — MONTELUKAST 10 MG: 10 TABLET, FILM COATED ORAL at 21:07

## 2020-08-10 RX ADMIN — IPRATROPIUM BROMIDE AND ALBUTEROL 1 PUFF: 20; 100 SPRAY, METERED RESPIRATORY (INHALATION) at 08:18

## 2020-08-10 RX ADMIN — PIPERACILLIN SODIUM AND TAZOBACTAM SODIUM 4.5 G: 4; .5 INJECTION, POWDER, LYOPHILIZED, FOR SOLUTION INTRAVENOUS at 15:00

## 2020-08-10 RX ADMIN — INSULIN LISPRO 9 UNITS: 100 INJECTION, SOLUTION INTRAVENOUS; SUBCUTANEOUS at 16:30

## 2020-08-10 RX ADMIN — INSULIN GLARGINE 55 UNITS: 100 INJECTION, SOLUTION SUBCUTANEOUS at 21:06

## 2020-08-10 RX ADMIN — METHYLPREDNISOLONE SODIUM SUCCINATE 60 MG: 40 INJECTION, POWDER, FOR SOLUTION INTRAMUSCULAR; INTRAVENOUS at 09:00

## 2020-08-10 RX ADMIN — ENOXAPARIN SODIUM 40 MG: 40 INJECTION SUBCUTANEOUS at 21:07

## 2020-08-10 RX ADMIN — IPRATROPIUM BROMIDE AND ALBUTEROL 1 PUFF: 20; 100 SPRAY, METERED RESPIRATORY (INHALATION) at 23:01

## 2020-08-10 RX ADMIN — LISINOPRIL 20 MG: 20 TABLET ORAL at 21:17

## 2020-08-10 RX ADMIN — IPRATROPIUM BROMIDE AND ALBUTEROL 1 PUFF: 20; 100 SPRAY, METERED RESPIRATORY (INHALATION) at 19:30

## 2020-08-10 RX ADMIN — HYDROCHLOROTHIAZIDE 12.5 MG: 25 TABLET ORAL at 09:00

## 2020-08-10 RX ADMIN — FUROSEMIDE 20 MG: 20 TABLET ORAL at 09:00

## 2020-08-10 RX ADMIN — ENOXAPARIN SODIUM 40 MG: 40 INJECTION SUBCUTANEOUS at 03:15

## 2020-08-10 RX ADMIN — PIPERACILLIN SODIUM AND TAZOBACTAM SODIUM 4.5 G: 4; .5 INJECTION, POWDER, LYOPHILIZED, FOR SOLUTION INTRAVENOUS at 03:15

## 2020-08-10 RX ADMIN — FLUTICASONE PROPIONATE 2 PUFF: 110 AEROSOL, METERED RESPIRATORY (INHALATION) at 08:18

## 2020-08-10 RX ADMIN — PIPERACILLIN SODIUM AND TAZOBACTAM SODIUM 4.5 G: 4; .5 INJECTION, POWDER, LYOPHILIZED, FOR SOLUTION INTRAVENOUS at 09:00

## 2020-08-10 NOTE — DIABETES MGMT
GLYCEMIC CONTROL PLAN OF CARE     Assessment/Recommendations:  Lab glucose this am 249 mg/dl  Noted lantus dose increased to 55 units every bedtime. Continue corrective insulin coverage as ordered  Pt already receiving the very insulin resistant scale  Will continue inpatient monitoring. Most recent blood glucose values:    Results for Micha Helton (MRN [id number]) as of 8/10/2020 12:12   Ref. Range 8/9/2020 16:31 8/9/2020 17:32 8/9/2020 22:31 8/10/2020 09:02 8/10/2020 11:31   GLUCOSE,FAST - POC Latest Ref Range: 70 - 110 mg/dL  284 (H) 197 (H) 182 (H) 208 (H)     Current A1C of 8.4 % is equivalent to average blood glucose of 194 mg/dl over the past 2-3 months. Current hospital diabetes medications:   Lantus 55 units every bedtime  Lispro corrective insulin coverage AC&HS  Previous day's insulin requirements:   Lantus 50 units  Lispro 27 units corrective insulin  Home diabetes medications: per pt by phone  Lantus 45 units every bedtime. Humalog corrective coverage with meals and bedtime.   Diet:    DIET DIABETIC CONSISTENT CARB Regular  Education:  _x__Refer to Diabetes Education Record             ___Education not indicated at this time      Chaim Freeman, [address] [facility] CDE  Ext [phone]

## 2020-08-10 NOTE — PROGRESS NOTES
Jeromy Eason, who is a 60 y.o.,female. Patient's Primary Language is: [place]. According to the patient's EMR Jainism Affiliation is: Susy Singleton provided nursing staff with a [address] for the patient. Provided leaflet about virtual visits. Chaplains can provide Spiritual Care to [facility] patients via phone conference. Patients can call Spiritual Care at [phone], or dial; or - dial [phone] - on their hospital phones and ask the  to page a . No physical visits will be initiated by Chaplains while patients are in isolation for [facility]. Connect care will be noted with [facility] (do not visit) until patients are no longer in [address] isolation. If a COVID19 patient has a phone conference with a ; connect care will be noted IV-SA-[id number]-(followed by the Chaplains initials). Plan:  Chaplains will continue to follow and will provide pastoral care on an as needed/requested basis.  recommends bedside caregivers page  on duty if patient shows signs of acute spiritual or emotional distress.       [phone]

## 2020-08-10 NOTE — DIABETES MGMT
Diabetes Patient/Family Education Record    FACTORS THAT MAY INFLUENCE PATIENTS ABILITY TO LEARN OR COMPLY WITH RECOMMENDATIONS:   []   Language barrier    []   Cultural needs   []   Motivation    []   Cognitive limitation    []   Physical   []   Education    []   Physiological factors   []   Hearing/vision/speaking impairment   []   Amish beliefs    []   Financial factors   []  Other:   [x]  No factors identified at this time. Person Instructed:   [x]   Patient   []   Family   []  Other     Preference for Learning:   [x]   Verbal        []   Written             []  Demonstration     Level of Comprehension & Competence:    [x]  Good                                      [] Fair                                     []  Poor                             []  Needs Reinforcement   [  x ]Teach back completed    Education Component:  Reviewed by phone. Patient on Covid-19 positive isolation. [x]  Medication management, including how to administer insulin (when appropriate) and potential medication interactions       Proper use and disposal of needles and syringes   []  Nutritional management, including the role of carbohydrate intake in blood glucose management   []  Exercise   [x]  Signs, symptoms, and treatment of hyperglycemia and hypoglycemia   [x]  Target blood glucose levels   [x]  Importance of blood glucose monitoring and how to obtain a blood glucose meter   · Has a glucometer and supplies at home. · Checks her BG 3 times daily before meals and at bedtime. []  Instruction on use of meter   [x]  Discuss and provide patient with HbA1C results  · 8.4%  · Patient states it usually runs in the 6's and 7's, but she has been receiving a lot of steroids lately.    [x]  Sick day guidelines   [x]  Health complications/consequences of non-compliance   [x]  Contact Information Provided    [x]  Goal:  Patient/family will demonstrate understanding of Diabetes Self Management Skills by:                  Date: __8/15/2020_______   Plan for post-discharge education or self-management support:    [] Outpatient class schedule provided            [] Patient Declined    [] Scheduled for outpatient classes (date) _______         Linsey Phillips RN CDE  12:48 PM

## 2020-08-10 NOTE — PROGRESS NOTES
[facility]  Pulmonary, Critical Care, and Sleep Medicine    Pulmonary Medicine Progress Note    Name: Dawood Garduno MRN: [id number]  : 1960 Hospital: [address] [place] [address]  Date: 8/10/2020       Subjective:  Pt is feeling much better. Overall improved. On NC. Out of bed. Cough improving. No fevers. Patient Active Problem List   Diagnosis Code    Essential hypertension, benign I10    Diabetes mellitus, type 2 ([facility] [address] E11.9    Esophageal reflux K21.9    SHERRIE on CPAP G47.33, Z99.89    COPD (chronic obstructive pulmonary disease) ([facility]) J44.9    Allergic rhinitis J30.9    COPD with acute exacerbation ([facility]) J44.1    Obesity, Class III, BMI 40-49.9 (morbid obesity) ([facility]) E66.01    Chest pain R07.9    Dyspnea R06.00    COPD exacerbation ([facility]) J44.1    Acute exacerbation of COPD with asthma ([facility]) J44.1, I28.326    Diastolic CHF, chronic ([facility]) I50.32    Diverticulosis K57.90    COPD with acute bronchitis ([facility]) J44.0, J20.9    Hyperlipidemia E78.5    Type 2 diabetes with nephropathy ([facility]) E11.21    Bilateral carotid bruits R09.89    Palpitations R00.2    Acute exacerbation of chronic obstructive pulmonary disease (COPD) ([facility]) J44.1    Atelectasis of right lung J98.11    Hypoxia R09.02    COVID-19 U07.1    Hypokalemia E87.6    COVID-19 virus infection U07.1    Acute respiratory disease due to COVID-19 virus U07.1, J06.9    Pneumonia due to COVID-19 virus U07.1, J12.89       Assessment:  · Acute exacerbation of COPD/asthma overlap syndrome -- Secondary to recent COVID-19 pneumonia/sepsis- now with bronchitis  · Acute on chronic hypoxic and hypercapnic respiratory failure.  O2 requirements increased to 4 LPM, baseline 2 LPM  · Underlying poorly controlled severe persistent asthma with steroid dependence  · COPD, gold D  · COVID-19 pneumonia/severe sepsis   · Worsening dyspnea/SOB:  Etiology multifactorial, due to above  · Morbid obesity:Body mass index is 47.3 kg/m².  · Obesity hypoventilation syndrome with SHERRIE: Patient on trilogy in the [facility] AE setting at home  · Chronic steroid dependence  · Previous smoking hx:  Quit 14 years ago  · CHFpEF  · Pulm HTN: [address] groups 2 and 3 disease    Impression/Plan:  · Supplemental oxygen to maintain SpO2 >88%  · Bipap-ST QHS and PRN -- can do AVAPS-ST mode if possible  · duonebs q4h, pulmicort nebs 1mg BID, and albuterol PRN   · Please hold home bronchodilators.  May resume on discharge  · Change to PO steroids. Recommend a slower home taper and the patient feels she got significantly worse after she went from 40mg to 20mg. · Recommend 40qd x 7 days, 30qdx7 days, 20qd x 7 days, then 10qd x7 days. · Recommend converting to PO ABX to complete a 7 day course. · No further COVID pneumonia treatment- has received Remdesivir and Convalescent plasma  · Maintain net negative fluid balance as renal function tolerates. Diuresis per primary service   · Aggressive pulmonary toileting/bronchial hygiene  · Frequent incentive spirometry  · Aspiration precautions including elevating HOB >30deg  · PT/OT, OOB, ambulate with assistance as tolerated  · DVT ppx per primary service -- advise high intensity ppx with lovenox SQ 40mg BID   · GI ppx due to high dose steroids    Can likely be discharged tomorrow. FiO2 to keep SpO2 >=92%, HOB >=30 degree, aspiration precautions, aggressive pulmonary toileting, incentive spirometry. Other issues management by primary team and respective consultants. Events and notes from last 24 hours reviewed. Discussed with patient and family, answered all questions to their satisfaction. Care plan discussed with nursing.      Labs and images personally seen and available reports reviewed  All current medicines are reviewed       Medications- Current:  Current Facility-Administered Medications   Medication Dose Route Frequency    ipratropium-albuterol (COMBIVENT RESPIMAT) 20 mcg-100 mcg inhalation spray  1 Puff Inhalation Q4H RT    sodium chloride (NS) flush 5-40 mL  5-40 mL IntraVENous PRN    enoxaparin (LOVENOX) injection 40 mg  40 mg SubCUTAneous Q12H    insulin glargine (LANTUS) injection 55 Units  55 Units SubCUTAneous QHS    insulin lispro (HUMALOG) injection   SubCUTAneous AC&HS    levoFLOXacin (LEVAQUIN) 750 mg in D5W IVPB  750 mg IntraVENous Q24H    acetaminophen (TYLENOL) tablet 500 mg  500 mg Oral Q6H PRN    aspirin delayed-release tablet 81 mg  81 mg Oral DAILY    furosemide (LASIX) tablet 20 mg  20 mg Oral DAILY    hydroCHLOROthiazide (HYDRODIURIL) tablet 12.5 mg  12.5 mg Oral DAILY    lisinopriL (PRINIVIL, ZESTRIL) tablet 20 mg  20 mg Oral BID    loratadine (CLARITIN) tablet 10 mg  10 mg Oral DAILY PRN    montelukast (SINGULAIR) tablet 10 mg  10 mg Oral QHS    tiotropium bromide (SPIRIVA RESPIMAT) 2.5 mcg /actuation  2 Puff Inhalation QAM RT    sodium chloride (NS) flush 5-40 mL  5-40 mL IntraVENous PRN    acetaminophen (TYLENOL) tablet 650 mg  650 mg Oral Q6H PRN    Or    acetaminophen (TYLENOL) suppository 650 mg  650 mg Rectal Q6H PRN    polyethylene glycol (MIRALAX) packet 17 g  17 g Oral DAILY PRN    promethazine (PHENERGAN) tablet 12.5 mg  12.5 mg Oral Q6H PRN    Or    ondansetron (ZOFRAN) injection 4 mg  4 mg IntraVENous Q6H PRN    fluticasone (FLOVENT HFA) 110 mcg inhaler  2 Puff Inhalation BID RT    methylPREDNISolone (PF) (SOLU-MEDROL) injection 60 mg  60 mg IntraVENous Q12H    piperacillin-tazobactam (ZOSYN) 4.5 g in 0.9% sodium chloride (MBP/ADV) 100 mL MBP  4.5 g IntraVENous Q6H    pantoprazole (PROTONIX) tablet 40 mg  40 mg Oral ACB    glucose chewable tablet 16 g  4 Tab Oral PRN    glucagon (GLUCAGEN) injection 1 mg  1 mg IntraMUSCular PRN    dextrose (D50W) injection syrg 12.5-25 g  25-50 mL IntraVENous PRN       Objective:  Vital Signs:    Visit Vitals  BP (!) 152/113   Pulse 70   Temp 97.5 °F (36.4 °C)   Resp 14   Ht 5' 3\" (1.6 m)   Wt 121.1 kg (267 lb)   SpO2 97%   BMI 47.30 kg/m²      O2 Device: Nasal cannula  O2 Flow Rate (L/min): 3 l/min  Temp (24hrs), Av.8 °F (36.6 °C), Min:97.5 °F (36.4 °C), Max:98 °F (36.7 °C)      Intake/Output:   Last shift:      No intake/output data recorded. Last 3 shifts:  1901 - 08/10 0700  In: 600 [I.V.:600]  Out: 300 [Urine:300]    Intake/Output Summary (Last 24 hours) at 8/10/2020 1451  Last data filed at 8/10/2020 0315  Gross per 24 hour   Intake 600 ml   Output 300 ml   Net 300 ml       Physical Exam:     General/Neurology: Alert, Awake, cooperative. Morbidly obese. Head:   Normocephalic, without obvious abnormality  Eye:   PERRL, EOM intact  Oral:  Mucus membranes moist  Lung:   B/l air entry fair. Scattered expiratory wheezes. No rales. Heart:   S1 S2 present  Abdomen:  Soft, non tender, BS+nt   Extremities:  No pedal edema. Skin:   Dry, intact  Data:      Recent Results (from the past 24 hour(s))   POC LACTIC ACID    Collection Time: 20  4:31 PM   Result Value Ref Range    Lactic Acid (POC) 5.81 (HH) 0.40 - 2.00 mmol/L   GLUCOSE, POC    Collection Time: 20  5:32 PM   Result Value Ref Range    Glucose (POC) 284 (H) 70 - 110 mg/dL   GLUCOSE, POC    Collection Time: 20 10:31 PM   Result Value Ref Range    Glucose (POC) 197 (H) 70 - 110 mg/dL   CBC WITH AUTOMATED DIFF    Collection Time: 08/10/20  3:26 AM   Result Value Ref Range    WBC 9.6 4.6 - 13.2 K/uL    RBC 3.67 (L) 4.20 - 5.30 M/uL    HGB 11.4 (L) 12.0 - 16.0 g/dL    HCT 34.4 (L) 35.0 - 45.0 %    MCV 93.7 74.0 - 97.0 FL    MCH 31.1 24.0 - 34.0 PG    MCHC 33.1 31.0 - 37.0 g/dL    RDW 14.3 11.6 - 14.5 %    PLATELET 088 190 - 207 K/uL    MPV 9.2 9.2 - 11.8 FL    NEUTROPHILS 87 (H) 40 - 73 %    LYMPHOCYTES 9 (L) 21 - 52 %    MONOCYTES 4 3 - 10 %    EOSINOPHILS 0 0 - 5 %    BASOPHILS 0 0 - 2 %    ABS. NEUTROPHILS 8.4 (H) 1.8 - 8.0 K/UL    ABS. LYMPHOCYTES 0.8 (L) 0.9 - 3.6 K/UL    ABS. MONOCYTES 0.4 0.05 - 1.2 K/UL    ABS.  EOSINOPHILS 0.0 0.0 - 0.4 K/UL    ABS.  BASOPHILS 0.0 0.0 - 0.1 K/UL    DF AUTOMATED     LACTIC ACID    Collection Time: 08/10/20  3:26 AM   Result Value Ref Range    Lactic acid 4.8 (HH) 0.4 - 2.0 MMOL/L   METABOLIC PANEL, BASIC    Collection Time: 08/10/20  3:26 AM   Result Value Ref Range    Sodium 135 (L) 136 - 145 mmol/L    Potassium 4.5 3.5 - 5.5 mmol/L    Chloride 99 (L) 100 - 111 mmol/L    CO2 30 21 - 32 mmol/L    Anion gap 6 3.0 - 18 mmol/L    Glucose 249 (H) 74 - 99 mg/dL    BUN 22 (H) 7.0 - 18 MG/DL    Creatinine 1.10 0.6 - 1.3 MG/DL    BUN/Creatinine ratio 20 12 - 20      GFR est AA >60 >60 ml/min/1.73m2    GFR est non-AA 51 (L) >60 ml/min/1.73m2    Calcium 9.4 8.5 - 10.1 MG/DL   GLUCOSE, POC    Collection Time: 08/10/20  9:02 AM   Result Value Ref Range    Glucose (POC) 182 (H) 70 - 110 mg/dL   GLUCOSE, POC    Collection Time: 08/10/20 11:31 AM   Result Value Ref Range    Glucose (POC) 208 (H) 70 - 110 mg/dL         Chemistry   Recent Labs     08/10/20  0326 08/09/20  0400 08/08/20  0430 08/08/20  0130 08/07/20  1510   * 234* 231*  --  156*   * 136 137  --  141   K 4.5 4.3 4.4  --  3.7   CL 99* 101 102  --  102   CO2 30 27 23  --  31   BUN 22* 20* 21*  --  23*   CREA 1.10 1.03 1.08  --  0.84   CA 9.4 9.3 9.2  --  10.5*   MG  --   --   --  1.4* 1.6   PHOS  --   --   --  4.0  --    AGAP 6 8 12  --  8   BUCR 20 19 19  --  27*   AP  --   --   --   --  66   TP  --   --   --   --  7.7   ALB  --   --   --   --  3.8   GLOB  --   --   --   --  3.9   AGRAT  --   --   --   --  1.0       CBC w/Diff   Recent Labs     08/10/20  0326 08/09/20  0400 08/08/20  0430   WBC 9.6 10.3 10.7   RBC 3.67* 3.87* 3.92*   HGB 11.4* 11.7* 11.9*   HCT 34.4* 36.1 36.2    258 244   GRANS 87* 86* 90*   LYMPH 9* 9* 7*   EOS 0 0 0       ABG   Recent Labs     08/08/20  0914   PHI 7.39   PCO2I 38.6   PO2I 89   HCO3I 23.2   FIO2I 32       Micro    Recent Labs     08/07/20  1525 08/07/20  1510   CULT NO GROWTH 3 DAYS NO GROWTH 3 DAYS     Recent Labs 08/07/20  1525 08/07/20  1510   CULT NO GROWTH 3 DAYS NO GROWTH 3 DAYS       CT (Most Recent)   Results from [place] encounter on 07/30/20   CT ABD PELV WO CONT    Addendum Addendum: Addendum:  As mentioned in the initial report but not in impression are extensive peripheral reticular and fibrotic opacities in imaged bilateral lower lung  new since the CT in 4/20 and concerning subacute interstitial pneumonitis. Clinical correlation and chest CT evaluation advised. Violeta Pinzon MD 7/30/2020 10:38 AM          Narrative CT of abdomen and pelvis without contrast     INDICATION: Increasing pain at the ventral hernia    COMPARISON: CT 4/8/20    TECHNIQUE: 5 mm helical scan to the abdomen and pelvis is obtained  from the  diaphragm to the symphysis pubis without  IV contrast administration. All CT scans at this facility performed using dose optimization techniques as  appreciated to a performed exam, to include automated exposure control,  adjustment of the mA and or KU according to patient size (including appropriate  matching for site specific examination), or use of iterative reconstruction  technique. FINDINGS: The study is suboptimal due to lack of IV contrast.  Subtle  abnormality can be under detected. Lung Bases: Moderate streaky and reticular opacities identified in imaged  bilateral lower lobes, new since the prior study. Atelectasis in right middle  lobe along the fissure. Prominent pericardial fat pad. Liver: Normal.    Gallbladder: Surgically absent. Biliary System: No ductal dilatation. Spleen: Normal.    Pancreas: Normal.    Adrenal Glands: Normal.    Kidneys: Normal.    Bowel: The stomach is filled with food content within no definite abnormality. The small bowel is mildly dilated with air-fluid levels as well as mild mucosal  thickening, especially in the jejunum. The dilatation is extending to the  terminal ileum with no definite transit point.  The colon is nondilated with  scattered diverticula throughout. No diverticulitis. The periumbilical hernia is slightly decreased in size from 6.7 x 7.2 cm to 5.8  x 6.3 cm. The small bowel loop within the hernia sac is no longer evident  instead is abutting the hernia. Although, the mesenteric fat within the hernia  sac appears inflamed. Mild hernia wall thickening and minimal fluid appear  unchanged. Lower genitourinary system: The bladder is poorly distended. The uterus is  surgically absent. Peritoneum/Retroperitoneum: No adenopathy. Vasculature: Moderate atherosclerotic aortic disease. Other: No free fluid. CT OSSEOUS STRUCTURES:       Unremarkable for age. Impression IMPRESSION:     1. The ventral hernia is slightly smaller and the small bowel loop is no longer  seen in the hernia sac but abutting the hernia neck compared to the prior CT in  4/20. The mesenteric within the hernia sac becomes strained, suggesting fatty  inflammation/ischemia. 2. Interval development of mild small bowel dilatation with air-fluid level and  mild mural thickening all the way to the terminal ileum with no evidence of  transit point. The finding could represent enteritis or adynamic ileus. Recommend clinical correlation. 3. Diverticulosis coli. Thank you for this referral.        XR (Most Recent). CXR reviewed by me and compared with previous CXR   Results from Hospital Encounter encounter on 08/07/20   XR CHEST PORT    Narrative EXAM: XR CHEST PORT    INDICATION: 60 years Female. sob/covid. ADDITIONAL HISTORY: None. TECHNIQUE: Frontal view of the chest.    COMPARISON: 7/27/2020    FINDINGS:    The cardiac silhouette is within normal limits in size. There is linear opacities in the peripheral aspect of the bilateral mid and  lower lung zones which are less pronounced than prior. No definite evidence of pleural effusion. No evidence of pneumothorax. Tethering  of the left lateral hemidiaphragm.     Osseous structures are unchanged in appearance. Impression IMPRESSION:  Interval improved linear peripheral opacities in the bilateral mid and lower  lung zones, suggesting of scarring although persistent infiltrates are not  excluded. Recommend continued imaging follow-up. See my orders for details     Total care time exclusive of procedures with complex decision making, coordination of care and counseling patient performed and > 50% time spent in face to face evaluation as mentioned above.     Joaquim Kidd MD  Critical Care Medicine

## 2020-08-10 NOTE — PROGRESS NOTES
Problem: Chronic Obstructive Pulmonary Disease (COPD)  Goal: *Oxygen saturation during activity within specified parameters  Outcome: Progressing Towards Goal  Goal: *Able to remain out of bed as prescribed  Outcome: Progressing Towards Goal  Goal: *Absence of hypoxia  Outcome: Progressing Towards Goal  Goal: *Optimize nutritional status  Outcome: Progressing Towards Goal     Problem: Asthma: Day 3  Goal: Respiratory  Outcome: Progressing Towards Goal     Problem: Asthma: Day 3  Goal: Activity/Safety  Outcome: Progressing Towards Goal     Problem: Asthma: Day 3  Goal: Nutrition/Diet  Outcome: Progressing Towards Goal     Problem: Asthma: Day 3  Goal: Treatments/Interventions/Procedures  Outcome: Progressing Towards Goal

## 2020-08-10 NOTE — PROGRESS NOTES
PHYSICAL THERAPY EVALUATION AND DISCHARGE    Patient: Mekhi Song (28 y.o. female)  Date: 8/10/2020  Primary Diagnosis: COPD exacerbation ([facility] [address] [J44.1]  Pneumonia due to COVID-19 virus [U07.1, J12.89]        Precautions:   Fall, Contact  PLOF: Patient reports she is independent with self care and functional mobility. She has cane and RW.     ASSESSMENT :  Patient received sitting EOB on 3L NC agreeable to PT evaluation. Patient performs transfers and gait at modified independent/supervision level. SPO2 fom 93-97% with mobility. Patient is well aware of activity pacing and energy conservation techniques. Based on the objective data described below, the patient is close to her baseline level of mobility. She can continue mobilizing with supervision of nursing staff. Patient is safe from mobility stand point for discharge to home when medically cleared and will be discharge from PT caseload. PLAN :  Recommendations and Planned Interventions:   No formal PT needs identified at this time. Discharge Recommendations: Home Health  Further Equipment Recommendations for Discharge: N/A     SUBJECTIVE:   Patient stated Its my [birth year] this [birth year], I hope I can be home.     OBJECTIVE DATA SUMMARY:     Past Medical History:   Diagnosis Date    Asthma     Chronic lung disease     COPD     Cystocele, midline     Diabetes mellitus ([facility] [address]     GERD (gastroesophageal reflux disease)     Hidradenitis suppurativa     Hyperlipidemia     Hypertension     SHERRIE on CPAP     CPAP    Stress incontinence      Past Surgical History:   Procedure Laterality Date    BREAST SURGERY PROCEDURE UNLISTED      Right breast benign tumor removal    HX APPENDECTOMY      HX CHOLECYSTECTOMY      HX DILATION AND CURETTAGE      Dysfunctional uterine bleeding, thought 2/2 fibroids    HX TUBAL LIGATION       Barriers to Learning/Limitations: None  Compensate with: N/A  Home Situation:   Home Situation  Home Environment: Private residence  One/Two Story Residence: Two story  Living Alone: No  Patient Expects to be Discharged to[de-identified] Private residence  Current DME Used/Available at Home: [address], [address], straight, Commode, bedside, Raised toilet seat, Shower chair, Transfer bench, Walker, rolling  Tub or Shower Type: Tub/Shower combination  Critical Behavior:  Neurologic State: Alert  Orientation Level: Oriented X4  Cognition: Follows commands  Safety/Judgement: Fall prevention; Awareness of environment  Psychosocial  Patient Behaviors: Calm; Cooperative  Purposeful Interaction: Yes  Pt Identified Daily Priority: Clinical issues (comment)  Caritas Process: Nurture loving kindness;Establish trust;Teaching/learning; Attend basic human needs;Create healing environment;Supportive expression  Caring Interventions: Reassure; Therapeutic modalities  Reassure: Therapeutic listening; Informing;Quiet presence;Caring rounds  Therapeutic Modalities: Intentional therapeutic touch  Other Caring Modalities: hourly rounding      Strength:    Strength: Generally decreased, functional    Tone & Sensation:   Tone: Normal  Sensation: Intact       Range Of Motion:  AROM: Within functional limits       Functional Mobility:  Bed Mobility:    Scooting: Supervision  Transfers:  Sit to Stand: Supervision  Stand to Sit: Supervision  Stand Pivot Transfers: Modified independent       Balance:   Sitting: Intact  Standing: Intact; Without support    Ambulation/Gait Training:  Distance (ft): 10 Feet (ft)(x2)  Ambulation - Level of Assistance: Supervision  Speed/Pam: Slow          Pain:  Pain level pre-treatment: 0/10   Pain level post-treatment: 0/10  Pain Intervention(s): Medication (see MAR); Rest, Ice, Repositioning   Response to intervention: Nurse notified, See doc flow    Activity Tolerance:   Fair  Please refer to the flowsheet for vital signs taken during this treatment.   After treatment:   []         Patient left in no apparent distress sitting up in chair  [x]         Patient left in no apparent distress sitting EOB  [x]         Call bell left within reach  []         Nursing notified  []         Caregiver present  []         Bed alarm activated  []         SCDs applied    COMMUNICATION/EDUCATION:   [x]         Role of Physical Therapy in the acute care setting. [x]         Fall prevention education was provided and the patient/caregiver indicated understanding. []         Patient/family have participated as able in goal setting and plan of care. []         Patient/family agree to work toward stated goals and plan of care. []         Patient understands intent and goals of therapy, but is neutral about his/her participation. []         Patient is unable to participate in goal setting/plan of care: ongoing with therapy staff.  []         Other:     Thank you for this referral.  Keke Prabhakar, PT   Time Calculation: 25 mins      Eval Complexity: History: MEDIUM  Complexity : 1-2 comorbidities / personal factors will impact the outcome/ POC Exam:LOW Complexity : 1-2 Standardized tests and measures addressing body structure, function, activity limitation and / or participation in recreation  Presentation: LOW Complexity : Stable, uncomplicated  Clinical Decision Making:Low Complexity    Overall Complexity:LOW

## 2020-08-10 NOTE — PROGRESS NOTES
[facility]  Progress Note    Patient: Latonya Etienne MRN: [id number]   SSN: [id number]  YOB: 1960   Age: 60 y.o. Sex: female      Admit Date: 8/7/2020    LOS: 3 days   Chief Complaint   Patient presents with    Other     Covid positive    Shortness of Breath       Subjective:     No acute events overnight. Transferred to stepdown, but on the ICU floor because of her need to use BiPAP qhs and they were out of beds. This morning she says she is doing well, had a restful night's sleep on BiPAP. She continues to cough green sputum. Denies any SOB on 2-3L O2, but has some chest tightness going around her ribs with inhalation. No chest pain or palpitations, fevers/chills, sore thorat. She got out of bed yesterday to urinate, felt dizzy, weak with SOB when standing up. Last BM was 3 days ago, denies abdominal pain or tenderness beyond her baseline due to her hernia. No urinary complaints. ROS as noted above    Objective:     Visit Vitals  /53   Pulse 70   Temp 97.5 °F (36.4 °C)   Resp 18   Ht 5' 3\" (1.6 m)   Wt 121.1 kg (267 lb)   SpO2 96%   BMI 47.30 kg/m²       Physical Exam:  General: chronically ill-appearnig, NAD, alert, friendly and communicative  HEENT: Conjunctiva pink, sclera anicteric. CV:  RRR, no M/G/R  RESP: Non labored breathing on BiPAP, tachypeic on exertion. Decreased BS diffusely, diffuse expiratory wheezes heard bilaterally but better than yesterday. ABD:  Soft. umbilical hernia TTP but reducible, Normoactive bowel sounds. MS:  No joint deformity or instability. Neuro: A+Ox3. Ext:  Trace edema in the LE bilaterally. 2+ radial and dp pulses bilaterally. Sensation intact in the LE bilaterally  Skin: warm, dry   Psych: normal mood and affect       Intake and Output:  Current Shift: No intake/output data recorded.   Last three shifts: 08/08 1901 - 08/10 0700  In: 600 [I.V.:600]  Out: 300 [Urine:300]    Lab/Data Review:  Recent Results (from the past 12 hour(s))   GLUCOSE, POC    Collection Time: 08/09/20 10:31 PM   Result Value Ref Range    Glucose (POC) 197 (H) 70 - 110 mg/dL   CBC WITH AUTOMATED DIFF    Collection Time: 08/10/20  3:26 AM   Result Value Ref Range    WBC 9.6 4.6 - 13.2 K/uL    RBC 3.67 (L) 4.20 - 5.30 M/uL    HGB 11.4 (L) 12.0 - 16.0 g/dL    HCT 34.4 (L) 35.0 - 45.0 %    MCV 93.7 74.0 - 97.0 FL    MCH 31.1 24.0 - 34.0 PG    MCHC 33.1 31.0 - 37.0 g/dL    RDW 14.3 11.6 - 14.5 %    PLATELET 016 674 - 042 K/uL    MPV 9.2 9.2 - 11.8 FL    NEUTROPHILS 87 (H) 40 - 73 %    LYMPHOCYTES 9 (L) 21 - 52 %    MONOCYTES 4 3 - 10 %    EOSINOPHILS 0 0 - 5 %    BASOPHILS 0 0 - 2 %    ABS. NEUTROPHILS 8.4 (H) 1.8 - 8.0 K/UL    ABS. LYMPHOCYTES 0.8 (L) 0.9 - 3.6 K/UL    ABS. MONOCYTES 0.4 0.05 - 1.2 K/UL    ABS. EOSINOPHILS 0.0 0.0 - 0.4 K/UL    ABS. BASOPHILS 0.0 0.0 - 0.1 K/UL    DF AUTOMATED     LACTIC ACID    Collection Time: 08/10/20  3:26 AM   Result Value Ref Range    Lactic acid 4.8 (HH) 0.4 - 2.0 MMOL/L   METABOLIC PANEL, BASIC    Collection Time: 08/10/20  3:26 AM   Result Value Ref Range    Sodium 135 (L) 136 - 145 mmol/L    Potassium 4.5 3.5 - 5.5 mmol/L    Chloride 99 (L) 100 - 111 mmol/L    CO2 30 21 - 32 mmol/L    Anion gap 6 3.0 - 18 mmol/L    Glucose 249 (H) 74 - 99 mg/dL    BUN 22 (H) 7.0 - 18 MG/DL    Creatinine 1.10 0.6 - 1.3 MG/DL    BUN/Creatinine ratio 20 12 - 20      GFR est AA >60 >60 ml/min/1.73m2    GFR est non-AA 51 (L) >60 ml/min/1.73m2    Calcium 9.4 8.5 - 10.1 MG/DL       RECENT RESULTS  MODALITY IMPRESSION   XR Results from [place] encounter on 08/07/20   XR CHEST PORT    Narrative EXAM: XR CHEST PORT    INDICATION: 60 years Female. sob/covid. ADDITIONAL HISTORY: None. TECHNIQUE: Frontal view of the chest.    COMPARISON: 7/27/2020    FINDINGS:    The cardiac silhouette is within normal limits in size.     There is linear opacities in the peripheral aspect of the bilateral mid and  lower lung zones which are less pronounced than prior. No definite evidence of pleural effusion. No evidence of pneumothorax. Tethering  of the left lateral hemidiaphragm. Osseous structures are unchanged in appearance. Impression IMPRESSION:  Interval improved linear peripheral opacities in the bilateral mid and lower  lung zones, suggesting of scarring although persistent infiltrates are not  excluded. Recommend continued imaging follow-up. CT Results from [place] encounter on 07/30/20   CT ABD PELV WO CONT    Addendum Addendum: Addendum:  As mentioned in the initial report but not in impression are extensive peripheral reticular and fibrotic opacities in imaged bilateral lower lung  new since the CT in 4/20 and concerning subacute interstitial pneumonitis. Clinical correlation and chest CT evaluation advised. Jorge Soto MD 7/30/2020 10:38 AM          Narrative CT of abdomen and pelvis without contrast     INDICATION: Increasing pain at the ventral hernia    COMPARISON: CT 4/8/20    TECHNIQUE: 5 mm helical scan to the abdomen and pelvis is obtained  from the  diaphragm to the symphysis pubis without  IV contrast administration. All CT scans at this facility performed using dose optimization techniques as  appreciated to a performed exam, to include automated exposure control,  adjustment of the mA and or KU according to patient size (including appropriate  matching for site specific examination), or use of iterative reconstruction  technique. FINDINGS: The study is suboptimal due to lack of IV contrast.  Subtle  abnormality can be under detected. Lung Bases: Moderate streaky and reticular opacities identified in imaged  bilateral lower lobes, new since the prior study. Atelectasis in right middle  lobe along the fissure. Prominent pericardial fat pad. Liver: Normal.    Gallbladder: Surgically absent. Biliary System: No ductal dilatation.     Spleen: Normal.    Pancreas: Normal.    Adrenal Glands: Normal.    Kidneys: Normal.    Bowel: The stomach is filled with food content within no definite abnormality. The small bowel is mildly dilated with air-fluid levels as well as mild mucosal  thickening, especially in the jejunum. The dilatation is extending to the  terminal ileum with no definite transit point. The colon is nondilated with  scattered diverticula throughout. No diverticulitis. The periumbilical hernia is slightly decreased in size from 6.7 x 7.2 cm to 5.8  x 6.3 cm. The small bowel loop within the hernia sac is no longer evident  instead is abutting the hernia. Although, the mesenteric fat within the hernia  sac appears inflamed. Mild hernia wall thickening and minimal fluid appear  unchanged. Lower genitourinary system: The bladder is poorly distended. The uterus is  surgically absent. Peritoneum/Retroperitoneum: No adenopathy. Vasculature: Moderate atherosclerotic aortic disease. Other: No free fluid. CT OSSEOUS STRUCTURES:       Unremarkable for age. Impression IMPRESSION:     1. The ventral hernia is slightly smaller and the small bowel loop is no longer  seen in the hernia sac but abutting the hernia neck compared to the prior CT in  4/20. The mesenteric within the hernia sac becomes strained, suggesting fatty  inflammation/ischemia. 2. Interval development of mild small bowel dilatation with air-fluid level and  mild mural thickening all the way to the terminal ileum with no evidence of  transit point. The finding could represent enteritis or adynamic ileus. Recommend clinical correlation. 3. Diverticulosis coli. Thank you for this referral.       MRI No results found for this or any previous visit. ULTRASOUND Results from [place] encounter on 04/10/17   US ABD COMP    Impression IMPRESSION:       1. Echogenic mildly enlarged liver, suggestive of hepatic steatosis. No focal  hepatic lesion identified. 2. Status post cholecystectomy.  No biliary ductal dilatation. Cardiology Procedures/Testing:  MODALITY RESULTS   EKG Results for orders placed or performed during the hospital encounter of 08/07/20   EKG, 12 LEAD, INITIAL   Result Value Ref Range    Ventricular Rate 80 BPM    Atrial Rate 80 BPM    P-R Interval 142 ms    QRS Duration 90 ms    Q-T Interval 378 ms    QTC Calculation (Bezet) 435 ms    Calculated P Axis 115 degrees    Calculated T Axis 108 degrees    Diagnosis       Sinus rhythm with premature atrial complexes  Low voltage QRS  Cannot rule out Anterior infarct (cited on or before 22-JUL-2020)  Nonspecific T wave abnormality  Abnormal ECG  When compared with ECG of 07-AUG-2020 15:24,  premature atrial complexes are now present  Confirmed by Belinda Ewing ([id number]) on 8/9/2020 10:00:13 AM         ECHO 05/21/20   ECHO ADULT COMPLETE 05/25/2020 5/25/2020    Narrative · Image quality for this study was technically difficult. Contrast used:   DEFINITY. · Normal cavity size and systolic function (ejection fraction normal). Moderate concentric hypertrophy. Estimated left ventricular ejection   fraction is 65 - 70%. Visually measured ejection fraction. No regional   wall motion abnormality noted. Moderate (grade 2) left ventricular   diastolic dysfunction. · Mildly dilated left atrium. · Pulmonary hypertension. Pulmonary arterial systolic pressure is 61 mmHg. · Severely elevated central venous pressure (15+ mmHg); IVC diameter is   larger than 21 mm and collapses less than 50% with respiration.         Signed by: Manda Rocha MD        Special Testing/Procedures:  MODALITY RESULTS   MICRO All Micro Results     Procedure Component Value Units Date/Time    CULTURE, BLOOD [[id number]] Collected:  08/07/20 1510    Order Status:  Completed Specimen:  Blood Updated:  08/10/20 0785     Special Requests: NO SPECIAL REQUESTS        Culture result: NO GROWTH 3 DAYS       CULTURE, BLOOD [[id number]] Collected:  08/07/20 1525    Order Status: Completed Specimen:  Blood Updated:  08/10/20 0736     Special Requests: NO SPECIAL REQUESTS        Culture result: NO GROWTH 3 DAYS       CULTURE, RESPIRATORY/SPUTUM/BRONCH Roddy Abarca [[id number]] Collected:  08/07/20 2115    Order Status:  Canceled Specimen:  Sputum          UA No results found for this or any previous visit. Telemetry yes   Oxygen NC/BiPAP     Assessment and Plan:     60 y.o. female with PMH of COPD on home O2 (3L NC), IDDM2, HTN, HFpEF, SHERRIE on CPAP, GERD, allergic rhinitis, recent admission for Covid pneumonia coupled with aute on chronic hypoxic respiratory failure, now admitted with worsening dyspnea.     Acute COPD Exacerbation/Asthma overlap syndrome. Improving. Pt w/ hx of positive COVID swab (7/7, 7/22), but negative on admission (8/7). S/p Remdesivir and Convalescnet plasma. Patient has COPD on max therapy w/ strong asthma component. Hospitalized 6x in 2020 for COPD exacerbations. Likely has a superimposed lung infection given elevated lactic acid, green sputum. No leukocytosis, although neutrophils 90%. Pending sputum culture, is on Levaquin, Zosyn. Continues to have chest tightness, but no cardiac concerns as troponins were negative and BNP is at baseline, no lower extremity edema.  CXR 8/7 showed interval improved linear peripheral opacities in the bilateral mid and lower lung zones.   - Pulm following, appreciate recs  - duonebs q4 hours   - Sputum Culture  - continue Advair and spiriva  - solumedrol 60mg q12h    - albuterol prn   - hold home bronchodilators   - Antibiotic: zosyn, levaquin  - hold home azithromycin M, W, F   - FU COVID  - Daily CBC, BMP   - Lovenox 40 mg for DVT prophylaxis BID  - Supplemental oxygen with goal of 88-92%  - Vital signs per unit routine  - Monitor I/Os  - PT/OT/CM     Insulin dependent Type 2 Diabetes  Home lantus 45u pm and novolog SSI  -Increase Lantus at 55 U; BG has been in 190-280s, but steroids also contribute to hyperglycemia  -SSI   -Accuchecks ACHS  -Diabetic diet  -Diabetic educator consult     Hypertension, HFpEF: ECHO (5/24/20) XD 61 - 70%. No regional wall motion abnormality. Moderate (grade 2) left ventricular diastolic dysfunction. Mildly dilated left atrium. Pulmonary hypertension. Pulmonary arterial systolic pressure is 61 mmHg. Severely elevated central venous pressure (15+ mmHg); IVC diameter is larger than 21 mm and collapses less than 50% with respiration. SBP in ED elevated  to 129s-139s.  Pt on lisinopril 20mg BID and HCTZ 12.5mg daily at home.  - Continue Lasix 20mg daily  - Continue Lisinopril 20mg BID  - Continue HCTZ 12.5 mg daily     GERD: Currently asymptomatic   -Continue omeprazole 40mg daily     Morbid obesity  -Diabetic diet       Diet diabetic   DVT Prophylaxis lovenox   GI Prophylaxis omeprazole   Code status Full   Disposition 1 [place], Home with [address] of [place]  Relationship: Daughter  [phone]     Darell Massey MD, PGY1   [address]   Intern Pager: [phone]   August 10, 2020, 11:15 AM

## 2020-08-10 NOTE — HOME CARE
Patient active to [facility]. Resumption of care order needed prior to discharge.      Kane Burger LPN   [facility] Liaison  [phone]

## 2020-08-10 NOTE — PROGRESS NOTES
Problem: Self Care Deficits Care Plan (Adult)  Goal: *Acute Goals and Plan of Care (Insert Text)  Outcome: Resolved/Met     OCCUPATIONAL THERAPY EVALUATION/DISCHARGE    Patient: Debora Velasquez (50 y.o. female)  Date: 8/10/2020  Primary Diagnosis: COPD exacerbation ([facility] [address] [J44.1]  Pneumonia due to COVID-19 virus [U07.1, J12.89]  Precautions: Fall, Contact  PLOF: Patient was modified independent with self-care and functional mobility PTA. ASSESSMENT AND RECOMMENDATIONS:  Patient cleared to participate in OT evaluation by RN. Upon entering the room, patient was seated edge of bed on 3L nasal cannula, alert, and agreeable to participate in OT evaluation. Patient was seen with PT to maximize patient safety, participation, and functional mobility in preparation for self-care tasks. Based on the objective data described below, the patient presents with no deficits that impede pt function with ADLs, functional transfers, and functional mobility. At this time patient is safe to d/c home with family support. OT to d/c from caseload at [facility]  s time. Skilled occupational therapy is not indicated at this time. Discharge Recommendations: Home Health  Further Equipment Recommendations for Discharge: Patient has all DME      SUBJECTIVE:   Patient stated it's almost my birthday.  I want to get better so I can go to [address]:     Past Medical History:   Diagnosis Date    Asthma     Chronic lung disease     COPD     Cystocele, midline     Diabetes mellitus ([facility] [address]     GERD (gastroesophageal reflux disease)     Hidradenitis suppurativa     Hyperlipidemia     Hypertension     SHERRIE on CPAP     CPAP    Stress incontinence      Past Surgical History:   Procedure Laterality Date    BREAST SURGERY PROCEDURE UNLISTED      Right breast benign tumor removal    HX APPENDECTOMY      HX CHOLECYSTECTOMY      HX DILATION AND CURETTAGE      Dysfunctional uterine bleeding, thought 2/2 fibroids    HX TUBAL LIGATION       Barriers to Learning/Limitations: None  Compensate with: visual, verbal, tactile, kinesthetic cues/model    Home Situation:   Home Situation  Home Environment: Private residence  One/Two Story Residence: Two story  Living Alone: No  Patient Expects to be Discharged to[de-identified] Private residence  Current DME Used/Available at Home: Cane, straight, Commode, bedside, Raised toilet seat, Shower chair, Transfer bench, Walker, rolling  Tub or Shower Type: Tub/Shower combination  [x]     Right hand dominant   []     Left hand dominant    Cognitive/Behavioral Status:  Neurologic State: Alert  Orientation Level: Oriented X4  Cognition: Follows commands  Safety/Judgement: Fall prevention; Awareness of environment    Skin: Intact  Edema: None noted    Vision/Perceptual:    Acuity: Within Defined Limits    Corrective Lenses: Reading glasses    Coordination: BUE     Fine Motor Skills-Upper: Left Intact; Right Intact    Gross Motor Skills-Upper: Left Intact; Right Intact    Balance:  Sitting: Intact  Standing: Intact    Strength: BUE  Strength: Generally decreased, functional    Tone & Sensation: BUE  Tone: Normal  Sensation: Intact    Range of Motion: BUE  AROM: Within functional limits    Functional Mobility and Transfers for ADLs:  Bed Mobility:  Scooting: Supervision(back into bed to tailor sit to don shoe)    Transfers:  Sit to Stand: Supervision  Stand to Sit: Supervision   Toilet Transfer : Supervision([facility] [place] [place])    ADL Assessment:  Feeding: Independent    Oral Facial Hygiene/Grooming: Modified Independent    Bathing: Modified independent    Upper Body Dressing: Modified independent    Lower Body Dressing: Modified independent    Toileting: Modified independent    ADL Intervention:  Upper Body Dressing Assistance  Dressing Assistance: Modified independent  Shirt simulation with hospital gown: Modified independent    Lower Body Dressing Assistance  Dressing Assistance: Modified independent  Slip on Shoes with Back: Modified independent  Leg Crossed Method Used: Yes  Position Performed: Seated edge of bed    Toileting  Toileting Assistance: Modified independent  Bladder Hygiene: Modified independent    Cognitive Retraining  Safety/Judgement: Fall prevention; Awareness of environment    Pain:  Pain level pre-treatment: 0/10   Pain level post-treatment: 0/10   Pain Intervention(s): Medication (see MAR); Response to intervention: Nurse notified, See doc flow    Activity Tolerance:   Fair+    Please refer to the flowsheet for vital signs taken during this treatment. After treatment:   []  Patient left in no apparent distress sitting up in chair  [x]  Patient left in no apparent distress in bed  [x]  Call bell left within reach  [x]  Nursing notified  []  Caregiver present  []  Bed alarm activated    COMMUNICATION/EDUCATION:   [x]      Role of Occupational Therapy in the acute care setting  [x]      Home safety education was provided and the patient/caregiver indicated understanding. [x]      Patient/family have participated as able and agree with findings and recommendations. []      Patient is unable to participate in plan of care at this time. Thank you for this referral.  Lila Benedict OTR/L  Time Calculation: 24 mins      Eval Complexity: History: MEDIUM Complexity : Expanded review of history including physical, cognitive and psychosocial  history ; Examination: LOW Complexity : 1-3 performance deficits relating to physical, cognitive , or psychosocial skils that result in activity limitations and / or participation restrictions ; Decision Making:HIGH Complexity : Patient presents with comorbidities that affect occupational performance.  Signifigant modification of tasks or assistance (eg, physical or verbal) with assessment (s) is necessary to enable patient to complete evaluation

## 2020-08-11 ENCOUNTER — HOME CARE VISIT (OUTPATIENT)
Dept: HOME HEALTH SERVICES | Facility: HOME HEALTH | Age: 60
End: 2020-08-11
Payer: MEDICARE

## 2020-08-11 LAB
ANION GAP SERPL CALC-SCNC: 5 MMOL/L (ref 3–18)
BASOPHILS # BLD: 0 K/UL (ref 0–0.1)
BASOPHILS NFR BLD: 0 % (ref 0–2)
BUN SERPL-MCNC: 22 MG/DL (ref 7–18)
BUN/CREAT SERPL: 24 (ref 12–20)
CALCIUM SERPL-MCNC: 10.1 MG/DL (ref 8.5–10.1)
CHLORIDE SERPL-SCNC: 99 MMOL/L (ref 100–111)
CO2 SERPL-SCNC: 34 MMOL/L (ref 21–32)
COVID-19 RAPID TEST, COVR: NOT DETECTED
CREAT SERPL-MCNC: 0.93 MG/DL (ref 0.6–1.3)
DIFFERENTIAL METHOD BLD: ABNORMAL
EOSINOPHIL # BLD: 0 K/UL (ref 0–0.4)
EOSINOPHIL NFR BLD: 0 % (ref 0–5)
ERYTHROCYTE [DISTWIDTH] IN BLOOD BY AUTOMATED COUNT: 14.2 % (ref 11.6–14.5)
GLUCOSE BLD STRIP.AUTO-MCNC: 101 MG/DL (ref 70–110)
GLUCOSE BLD STRIP.AUTO-MCNC: 137 MG/DL (ref 70–110)
GLUCOSE BLD STRIP.AUTO-MCNC: 151 MG/DL (ref 70–110)
GLUCOSE BLD STRIP.AUTO-MCNC: 264 MG/DL (ref 70–110)
GLUCOSE SERPL-MCNC: 92 MG/DL (ref 74–99)
HCT VFR BLD AUTO: 36.1 % (ref 35–45)
HGB BLD-MCNC: 11.8 G/DL (ref 12–16)
LACTATE SERPL-SCNC: 1.6 MMOL/L (ref 0.4–2)
LYMPHOCYTES # BLD: 2.4 K/UL (ref 0.9–3.6)
LYMPHOCYTES NFR BLD: 26 % (ref 21–52)
MCH RBC QN AUTO: 30.6 PG (ref 24–34)
MCHC RBC AUTO-ENTMCNC: 32.7 G/DL (ref 31–37)
MCV RBC AUTO: 93.5 FL (ref 74–97)
MONOCYTES # BLD: 0.8 K/UL (ref 0.05–1.2)
MONOCYTES NFR BLD: 8 % (ref 3–10)
NEUTS SEG # BLD: 5.9 K/UL (ref 1.8–8)
NEUTS SEG NFR BLD: 66 % (ref 40–73)
PLATELET # BLD AUTO: 275 K/UL (ref 135–420)
PMV BLD AUTO: 9.2 FL (ref 9.2–11.8)
POTASSIUM SERPL-SCNC: 3.4 MMOL/L (ref 3.5–5.5)
RBC # BLD AUTO: 3.86 M/UL (ref 4.2–5.3)
SODIUM SERPL-SCNC: 138 MMOL/L (ref 136–145)
SOURCE, COVRS: NORMAL
SPECIMEN TYPE, XMCV1T: NORMAL
WBC # BLD AUTO: 9 K/UL (ref 4.6–13.2)

## 2020-08-11 PROCEDURE — 74011250636 HC RX REV CODE- 250/636: Performed by: STUDENT IN AN ORGANIZED HEALTH CARE EDUCATION/TRAINING PROGRAM

## 2020-08-11 PROCEDURE — 94640 AIRWAY INHALATION TREATMENT: CPT

## 2020-08-11 PROCEDURE — 80048 BASIC METABOLIC PNL TOTAL CA: CPT

## 2020-08-11 PROCEDURE — 65660000000 HC RM CCU STEPDOWN

## 2020-08-11 PROCEDURE — 77010033678 HC OXYGEN DAILY

## 2020-08-11 PROCEDURE — 74011636637 HC RX REV CODE- 636/637: Performed by: INTERNAL MEDICINE

## 2020-08-11 PROCEDURE — 3331090002 HH PPS REVENUE DEBIT

## 2020-08-11 PROCEDURE — 74011000258 HC RX REV CODE- 258: Performed by: STUDENT IN AN ORGANIZED HEALTH CARE EDUCATION/TRAINING PROGRAM

## 2020-08-11 PROCEDURE — 94660 CPAP INITIATION&MGMT: CPT

## 2020-08-11 PROCEDURE — 85025 COMPLETE CBC W/AUTO DIFF WBC: CPT

## 2020-08-11 PROCEDURE — 74011250637 HC RX REV CODE- 250/637: Performed by: HOSPITALIST

## 2020-08-11 PROCEDURE — 3331090001 HH PPS REVENUE CREDIT

## 2020-08-11 PROCEDURE — 83605 ASSAY OF LACTIC ACID: CPT

## 2020-08-11 PROCEDURE — 87635 SARS-COV-2 COVID-19 AMP PRB: CPT

## 2020-08-11 PROCEDURE — 36415 COLL VENOUS BLD VENIPUNCTURE: CPT

## 2020-08-11 PROCEDURE — 74011636637 HC RX REV CODE- 636/637: Performed by: STUDENT IN AN ORGANIZED HEALTH CARE EDUCATION/TRAINING PROGRAM

## 2020-08-11 PROCEDURE — 74011250637 HC RX REV CODE- 250/637: Performed by: STUDENT IN AN ORGANIZED HEALTH CARE EDUCATION/TRAINING PROGRAM

## 2020-08-11 PROCEDURE — 82962 GLUCOSE BLOOD TEST: CPT

## 2020-08-11 PROCEDURE — 94664 DEMO&/EVAL PT USE INHALER: CPT

## 2020-08-11 PROCEDURE — 94762 N-INVAS EAR/PLS OXIMTRY CONT: CPT

## 2020-08-11 PROCEDURE — 74011636637 HC RX REV CODE- 636/637: Performed by: FAMILY MEDICINE

## 2020-08-11 RX ORDER — INSULIN GLARGINE 100 [IU]/ML
45 INJECTION, SOLUTION SUBCUTANEOUS
Status: DISCONTINUED | OUTPATIENT
Start: 2020-08-11 | End: 2020-08-12 | Stop reason: HOSPADM

## 2020-08-11 RX ORDER — DEXTROSE MONOHYDRATE 100 MG/ML
125-250 INJECTION, SOLUTION INTRAVENOUS AS NEEDED
Status: DISCONTINUED | OUTPATIENT
Start: 2020-08-11 | End: 2020-08-12 | Stop reason: HOSPADM

## 2020-08-11 RX ORDER — ENOXAPARIN SODIUM 100 MG/ML
40 INJECTION SUBCUTANEOUS EVERY 24 HOURS
Status: DISCONTINUED | OUTPATIENT
Start: 2020-08-12 | End: 2020-08-12 | Stop reason: HOSPADM

## 2020-08-11 RX ORDER — LEVOFLOXACIN 750 MG/1
750 TABLET ORAL EVERY 24 HOURS
Status: DISCONTINUED | OUTPATIENT
Start: 2020-08-11 | End: 2020-08-12 | Stop reason: HOSPADM

## 2020-08-11 RX ORDER — PREDNISONE 20 MG/1
40 TABLET ORAL DAILY
Qty: 12 TAB | Refills: 0 | Status: SHIPPED | OUTPATIENT
Start: 2020-08-12 | End: 2020-08-12

## 2020-08-11 RX ORDER — LISINOPRIL 40 MG/1
40 TABLET ORAL DAILY
Status: DISCONTINUED | OUTPATIENT
Start: 2020-08-12 | End: 2020-08-12 | Stop reason: HOSPADM

## 2020-08-11 RX ORDER — LEVOFLOXACIN 750 MG/1
750 TABLET ORAL EVERY 24 HOURS
Qty: 4 TAB | Refills: 0 | Status: SHIPPED | OUTPATIENT
Start: 2020-08-11 | End: 2020-08-12 | Stop reason: SDUPTHER

## 2020-08-11 RX ADMIN — MONTELUKAST 10 MG: 10 TABLET, FILM COATED ORAL at 22:21

## 2020-08-11 RX ADMIN — FLUTICASONE PROPIONATE 2 PUFF: 110 AEROSOL, METERED RESPIRATORY (INHALATION) at 10:25

## 2020-08-11 RX ADMIN — IPRATROPIUM BROMIDE AND ALBUTEROL 1 PUFF: 20; 100 SPRAY, METERED RESPIRATORY (INHALATION) at 23:30

## 2020-08-11 RX ADMIN — HYDROCHLOROTHIAZIDE 12.5 MG: 25 TABLET ORAL at 09:22

## 2020-08-11 RX ADMIN — IPRATROPIUM BROMIDE AND ALBUTEROL 1 PUFF: 20; 100 SPRAY, METERED RESPIRATORY (INHALATION) at 16:35

## 2020-08-11 RX ADMIN — FLUTICASONE PROPIONATE 2 PUFF: 110 AEROSOL, METERED RESPIRATORY (INHALATION) at 19:22

## 2020-08-11 RX ADMIN — PANTOPRAZOLE 40 MG: 40 TABLET, DELAYED RELEASE ORAL at 09:22

## 2020-08-11 RX ADMIN — PREDNISONE 40 MG: 20 TABLET ORAL at 09:22

## 2020-08-11 RX ADMIN — ASPIRIN 81 MG: 81 TABLET, COATED ORAL at 09:22

## 2020-08-11 RX ADMIN — LISINOPRIL 20 MG: 20 TABLET ORAL at 09:22

## 2020-08-11 RX ADMIN — IPRATROPIUM BROMIDE AND ALBUTEROL 1 PUFF: 20; 100 SPRAY, METERED RESPIRATORY (INHALATION) at 10:26

## 2020-08-11 RX ADMIN — LEVOFLOXACIN 750 MG: 750 TABLET, FILM COATED ORAL at 09:22

## 2020-08-11 RX ADMIN — INSULIN LISPRO 9 UNITS: 100 INJECTION, SOLUTION INTRAVENOUS; SUBCUTANEOUS at 16:32

## 2020-08-11 RX ADMIN — IPRATROPIUM BROMIDE AND ALBUTEROL 1 PUFF: 20; 100 SPRAY, METERED RESPIRATORY (INHALATION) at 19:22

## 2020-08-11 RX ADMIN — ENOXAPARIN SODIUM 40 MG: 40 INJECTION SUBCUTANEOUS at 09:24

## 2020-08-11 RX ADMIN — TIOTROPIUM BROMIDE INHALATION SPRAY 2 PUFF: 3.12 SPRAY, METERED RESPIRATORY (INHALATION) at 10:25

## 2020-08-11 RX ADMIN — PIPERACILLIN SODIUM AND TAZOBACTAM SODIUM 4.5 G: 4; .5 INJECTION, POWDER, LYOPHILIZED, FOR SOLUTION INTRAVENOUS at 03:00

## 2020-08-11 RX ADMIN — INSULIN GLARGINE 45 UNITS: 100 INJECTION, SOLUTION SUBCUTANEOUS at 22:21

## 2020-08-11 RX ADMIN — FUROSEMIDE 20 MG: 20 TABLET ORAL at 09:22

## 2020-08-11 RX ADMIN — INSULIN LISPRO 2 UNITS: 100 INJECTION, SOLUTION INTRAVENOUS; SUBCUTANEOUS at 22:21
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
## 2020-08-11 NOTE — ROUTINE PROCESS
Bedside and Verbal shift change report given to SHELLIE Nelson RN by Alex Avery. JOSÉ MIGUEL Hyatt . Report included the following information SBAR, Kardex, Intake/Output, MAR and Recent Results.

## 2020-08-11 NOTE — DIABETES MGMT
GLYCEMIC CONTROL PLAN OF CARE     Assessment/Recommendations:  Lab glucose this am 92 mg/dl. Noted steroids decreased starting today. Recommend decreasing Lantus dose to 45 units every bedtime. Continue corrective insulin coverage as ordered  Will continue inpatient monitoring. Most recent blood glucose values:    Results for Jodie Syed (MRN [id number]) as of 8/11/2020 11:13   Ref. Range 8/10/2020 09:02 8/10/2020 11:31 8/10/2020 16:03 8/10/2020 21:38 8/11/2020 08:15   GLUCOSE,FAST - POC Latest Ref Range: 70 - 110 mg/dL 182 (H) 208 (H) 276 (H) 208 (H) 101       Current A1C of 8.4 % is equivalent to average blood glucose of 194 mg/dl over the past 2-3 months. Current hospital diabetes medications:   Lantus 55 units every bedtime  Lispro corrective insulin coverage AC&HS  Previous day's insulin requirements:   Lantus 55 units  Lispro 18 units corrective insulin  Home diabetes medications: per pt by phone  Lantus 45 units every bedtime. Humalog corrective coverage with meals and bedtime.   Diet:    DIET DIABETIC CONSISTENT CARB Regular  Education:  _x__Refer to Diabetes Education Record             ___Education not indicated at this time      Carina Baldwin [facility] CDE  Ext [phone]

## 2020-08-11 NOTE — ROUTINE PROCESS
TRANSFER - OUT REPORT:    Verbal report given to Mayra VALDEZ on Marybel Whitt  being transferred to (unit) for routine progression of care       Report consisted of patients Situation, Background, Assessment and   Recommendations(SBAR). Information from the following report(s) SBAR, Kardex, Intake/Output, MAR, Recent Results and Cardiac Rhythm SR was reviewed with the receiving nurse. Lines:   Peripheral IV 08/09/20 Right Antecubital (Active)   Site Assessment Clean, dry, & intact 08/11/20 0800   Phlebitis Assessment 0 08/11/20 0800   Infiltration Assessment 0 08/11/20 0800   Dressing Status Clean, dry, & intact 08/11/20 0800   Dressing Type Transparent;Tape 08/11/20 0800   Hub Color/Line Status Pink;Capped 08/11/20 0800   Action Taken Open ports on tubing capped 08/11/20 0800   Alcohol Cap Used Yes 08/11/20 0800        Opportunity for questions and clarification was provided.       Patient transported with:   O2 @ 3 liters

## 2020-08-11 NOTE — DISCHARGE SUMMARY
[facility]  Discharge Summary    Patient: Zaida Morgan MRN: [id number]  CSN: [id number]    YOB: 1960  Age: 60 y.o. Sex: female      Admission Date: 8/7/2020 Discharge Date: 8/12/2020   Attending: No att. providers found PCP: Terrance Hughes MD     ===================================================================    Reason for Admission: COPD exacerbation ([address] [J44.1]  Pneumonia due to COVID-19 virus [U07.1, J12.89]    Discharge Diagnoses:   Acute COPD Exacerbation/Asthma Overlap Syndrome  HFpEF  HTN  Type II Diabetes  GERD  Morbid Obesity    Important notes to PCP/ follow-up studies and evaluations   Prenisone tapering plan starting 8/10/20: 40qd x 7 days, 30qdx7 days, 20qd x 7 days, then 10qd x7 days. She is to complete her Levaquin until 8/14 (7 day course). Please repeat BMP during follow up visit. She had elevated Blood Glucose to the 200s and increased insulin requirements likely due to the steroids. Please readjust her Lantus requirements while she gets tapered off of prednisone. Patient sent home with the following inhalers per Dr. Corinna Lieberman' recommendations: Advair 230-21 BID, Spiriva 2.5mcg BID, Combivent q4h prn, ProAir prn. Patient is to discontinue her Flovent and Duonebs. She will follow up with Dr. Chandni Mcclure of [facility] [address] on 8/14. Patient was COVID negative during this admission. Patient has an umbilical hernia, she will try see Dr. Allyssa Poe the week of Aug 17th. She requested a referral to [facility] if she is unable to see Dr. Allyssa Poe.     Pending labs and studies:  None    Operative Procedures:   None    Discharge Medications:     Discharge Medication List as of 8/12/2020  1:09 PM      START taking these medications    Details   ipratropium-albuteroL (COMBIVENT RESPIMAT)  mcg/actuation inhaler Take 1 Puff by inhalation every four (4) hours as needed for Wheezing., Print, Disp-1 Inhaler,R-0         CONTINUE these medications which have CHANGED    Details   levoFLOXacin (LEVAQUIN) 750 mg tablet Take 1 Tab by mouth every twenty-four (24) hours for 3 days. , Print, Disp-3 Tab,R-07 day course of Levofloxacin, 8/8-8/14. Take 1 pill per day from 8/12 to 8/14. predniSONE (DELTASONE) 20 mg tablet Take 40 mg by mouth daily for 5 days. , Print, Disp-10 Tab,R-040mg daily 8/10 to 8/17. Tapering regimen, start lowering dose after 8/17. CONTINUE these medications which have NOT CHANGED    Details   acetaminophen (TYLENOL) 500 mg tablet Take 500 mg by mouth every six (6) hours as needed for Fever or Pain., Historical Med      benzonatate (TESSALON) 100 mg capsule Take 1 Cap by mouth three (3) times daily as needed for Cough for up to 20 days. Indications: cough, Print, Disp-30 Cap,R-1      albuterol (PROVENTIL HFA, VENTOLIN HFA, PROAIR HFA) 90 mcg/actuation inhaler Take 2 Puffs by inhalation every four (4) hours as needed for Wheezing., Print, Disp-1 Inhaler, R-0      azithromycin (ZITHROMAX) 250 mg tablet Take 1 Tab by mouth every Monday, Wednesday, Friday. Monday-Friday., No Print, Disp-30 Tab, R-0      omeprazole (PRILOSEC) 40 mg capsule Take 40 mg by mouth daily. , Historical Med      insulin glargine (Basaglar KwikPen U-100 Insulin) 100 unit/mL (3 mL) inpn 45 Units by SubCUTAneous route nightly., Historical Med      furosemide (Lasix) 20 mg tablet Take 20 mg by mouth daily. , Historical Med      lactobacillus sp. 50 billion cpu (BIO-K PLUS) 50 billion cell -375 mg cap capsule Take 1 Cap by mouth daily. , Print, Disp-30 Cap, R-0      simethicone (GAS-X) 125 mg capsule Take 125 mg by mouth four (4) times daily as needed for Flatulence., Historical Med      loratadine (CLARITIN) 10 mg tablet Take 10 mg by mouth daily as needed for Allergies. , Historical Med      lisinopril (PRINIVIL, ZESTRIL) 20 mg tablet Take 20 mg by mouth two (2) times a day., Historical Med      fluticasone propion-salmeterol (ADVAIR HFA) 230-21 mcg/actuation inhaler Take 2 Puffs by inhalation two (2) times a day., Historical Med      hydroCHLOROthiazide (HYDRODIURIL) 12.5 mg tablet Take 12.5 mg by mouth daily. , Historical Med      tiotropium bromide (SPIRIVA RESPIMAT) 2.5 mcg/actuation inhaler Take 2 Puffs by inhalation daily. , Historical Med      NOVOLOG FLEXPEN U-100 INSULIN 100 unit/mL inpn Continue home Sliding scale insulin as prior to admission, Print, Disp-1 Pen, R-0, ERLINDA      OXYGEN-AIR DELIVERY SYSTEMS 2 L by Nasal route continuous. First Choice, Historical Med      aspirin delayed-release 81 mg tablet Take 81 mg by mouth daily. , Historical Med      montelukast (SINGULAIR) 10 mg tablet Take 10 mg by mouth nightly., Historical Med         STOP taking these medications       fluticasone propionate (Flovent HFA) 220 mcg/actuation inhaler Comments:   Reason for Stopping:         albuterol-ipratropium (DUO-NEB) 2.5 mg-0.5 mg/3 ml nebu Comments:   Reason for Stopping:               Disposition: Home with Home Health    Consultants:    [address] [facility] Course (including pertinent history and physical findings)  60 y.o. female with PMH of COPD on home O2 (3L NC), IDDM2, HTN, HFpEF, SHERRIE on CPAP, GERD, allergic rhinitis, recent prior admission for COVID pneumonia coupled with acute on chronic hypoxic respiratory failure. She was admitted for worsening dyspnea. Pulm was consulted, we suspect she had Acute COPD Exacerbation/Asthma overlap syndrome. Patient was negative for COVID (8/7, 8/11). She possibly had a superimposed lung infection given elevated lactic acid and green sputum, but sputum culture was not done. She has been on Levaquin and Zosyn and her condition improved to her baseline. She is discharged with Levaquin PO, and Prednisone PO on a tapering plan (40qd x 7 days, 30qdx7 days, 20qd x 7 days, then 10qd x7 days). She was restarted on her home Azithromycin MWF on discharge. She will see Dr. Cathie Lyon of [facility] [address]  on 8/14/20, and will adjust her medications as needed.    Insulin dependent Type 2 Diabetes. Her BG was elevated to 200s-300s during this hospitalization; Steroids likely contributed to her hyperglycemia. Her Lantus dose was adjusted accordingly. Her last A1C on record is 8.4 on 5/22/2020. She was discharged on her home 45U Lantus dose. Hypertension, HFpEF: ECHO (5/24/20) ZH 95 - 70% with no regional wall motion abnormality. Moderate (grade 2) left ventricular diastolic dysfunction. Mildly dilated left atrium. Pulmonary hypertension. Pulmonary arterial systolic pressure is 61 mmHg. Severely elevated central venous pressure (15+ mmHg); IVC diameter is larger than 21 mm and collapses less than 50% with respiration. Her home Lasix 20mg, Lisinopril 20mg BID, HCTZ 12.5mg, were continued during her stay. Her BP was appropriately controlled, with systolic of 044Y-936N.     GERD: She has been asymptomatic, we continued her home omeprazole 40mg daily. CURRENT ADMISSION IMAGING RESULTS   Xr Chest Port    Result Date: 8/7/2020  IMPRESSION: Interval improved linear peripheral opacities in the bilateral mid and lower lung zones, suggesting of scarring although persistent infiltrates are not excluded. Recommend continued imaging follow-up.         Cardiology Procedures/Testing:  MODALITY RESULTS   EKG Results for orders placed or performed during the hospital encounter of 08/07/20   EKG, 12 LEAD, INITIAL   Result Value Ref Range    Ventricular Rate 80 BPM    Atrial Rate 80 BPM    P-R Interval 142 ms    QRS Duration 90 ms    Q-T Interval 378 ms    QTC Calculation (Bezet) 435 ms    Calculated P Axis 115 degrees    Calculated T Axis 108 degrees    Diagnosis       Sinus rhythm with premature atrial complexes  Low voltage QRS  Cannot rule out Anterior infarct (cited on or before 22-JUL-2020)  Nonspecific T wave abnormality  Abnormal ECG  When compared with ECG of 07-AUG-2020 15:24,  premature atrial complexes are now present  Confirmed by Cody Fall ([phone]) on 8/9/2020 10:00:13 AM         ECHO 05/21/20   ECHO ADULT COMPLETE 05/25/2020 5/25/2020    Narrative · Image quality for this study was technically difficult. Contrast used:   DEFINITY. · Normal cavity size and systolic function (ejection fraction normal). Moderate concentric hypertrophy. Estimated left ventricular ejection   fraction is 65 - 70%. Visually measured ejection fraction. No regional   wall motion abnormality noted. Moderate (grade 2) left ventricular   diastolic dysfunction. · Mildly dilated left atrium. · Pulmonary hypertension. Pulmonary arterial systolic pressure is 61 mmHg. · Severely elevated central venous pressure (15+ mmHg); IVC diameter is   larger than 21 mm and collapses less than 50% with respiration. Signed by: Polly Ledbetter MD      Nuclear Medicine Results from [facility] Encounter encounter on 09/09/13   NM LUNG PERFUSION W VENT    Impression IMPRESSION:    Low probability of pulmonary embolism. Results from [place] encounter on 09/04/13   TRANSCRIBED NUCLEAR MEDICINE   Results from Hospital Encounter encounter on 12/06/12   NM LUNG VENT/PERF    Impression IMPRESSION:    Very low probability for pulmonary embolus. IR No results found for this or any previous visit.    CATH       Special Testing/Procedures:  MODALITY RESULTS   MICRO All Micro Results     Procedure Component Value Units Date/Time    CULTURE, BLOOD [[id number]] Collected:  08/07/20 1525    Order Status:  Completed Specimen:  Blood Updated:  08/13/20 0749     Special Requests: NO SPECIAL REQUESTS        Culture result: NO GROWTH 6 DAYS       CULTURE, BLOOD [[id number]] Collected:  08/07/20 1510    Order Status:  Completed Specimen:  Blood Updated:  08/13/20 0749     Special Requests: NO SPECIAL REQUESTS        Culture result: NO GROWTH 6 DAYS       CULTURE, RESPIRATORY/SPUTUM/BRONCH [address] STAIN [[id number]] Collected:  08/10/20 0900    Order Status:  Canceled Specimen:  Sputum     CULTURE, RESPIRATORY/SPUTUM/BRONCH [address] STAIN [[id number]] Collected:  08/07/20 2115    Order Status:  Canceled Specimen:  Sputum          ABG Lab Results   Component Value Date/Time    pH (POC) 7.39 08/08/2020 09:14 AM    pCO2 (POC) 38.6 08/08/2020 09:14 AM    pO2 (POC) 89 08/08/2020 09:14 AM    HCO3 (POC) 23.2 08/08/2020 09:14 AM    FIO2 (POC) 32 08/08/2020 09:14 AM      UA No results found for this or any previous visit. Laboratory Results:  LABORATORY RESULTS   HEMATOLOGY Lab Results   Component Value Date/Time    WBC 9.0 08/12/2020 01:57 AM    HGB 13.0 08/12/2020 01:57 AM    HCT 38.8 08/12/2020 01:57 AM    PLATELET 346 15/71/1693 01:57 AM    MCV 92.8 08/12/2020 01:57 AM       CHEMISTRIES Lab Results   Component Value Date/Time    Sodium 138 08/12/2020 01:57 AM    Potassium 3.5 08/12/2020 01:57 AM    Chloride 98 (L) 08/12/2020 01:57 AM    CO2 35 (H) 08/12/2020 01:57 AM    Anion gap 5 08/12/2020 01:57 AM    Glucose 169 (H) 08/12/2020 01:57 AM    BUN 20 (H) 08/12/2020 01:57 AM    Creatinine 0.88 08/12/2020 01:57 AM    BUN/Creatinine ratio 23 (H) 08/12/2020 01:57 AM    GFR est AA >60 08/12/2020 01:57 AM    GFR est non-AA >60 08/12/2020 01:57 AM    Calcium 9.6 08/12/2020 01:57 AM      HEPATIC FUNCTION Lab Results   Component Value Date/Time    Albumin 3.8 08/07/2020 03:10 PM    Bilirubin, direct <0.1 02/08/2017 10:00 PM    Bilirubin, total 0.7 08/07/2020 03:10 PM    Protein, total 7.7 08/07/2020 03:10 PM    Globulin 3.9 08/07/2020 03:10 PM    A-G Ratio 1.0 08/07/2020 03:10 PM    ALT (SGPT) 52 08/07/2020 03:10 PM    Alk.  phosphatase 66 08/07/2020 03:10 PM       LACTIC ACID Lab Results   Component Value Date/Time    Lactic acid 1.8 08/12/2020 01:57 AM    Lactic acid 1.6 08/11/2020 04:33 AM    Lactic acid 4.8 (HH) 08/10/2020 03:26 AM      CARDIAC PANEL Lab Results   Component Value Date/Time    CK 31 08/07/2020 03:10 PM    CK - MB <1.0 08/07/2020 03:10 PM    CK-MB Index  08/07/2020 03:10 PM     CALCULATION NOT PERFORMED WHEN RESULT IS BELOW LINEAR LIMIT    Troponin-I, QT <0.02 08/07/2020 03:10 PM      NT-proBNP Lab Results   Component Value Date/Time    NT pro-BNP 45 08/07/2020 03:10 PM    NT pro- 07/22/2020 12:24 PM    NT pro- 07/09/2020 03:53 PM    NT pro- 07/07/2020 04:15 PM    NT pro-BNP 95 06/03/2020 11:05 PM      THYROID Lab Results   Component Value Date/Time    TSH 2.76 09/18/2016 02:20 PM      LIPID PANEL Lab Results   Component Value Date/Time    Cholesterol, total 220 (H) 11/20/2012 03:22 AM    HDL Cholesterol 62 (H) 11/20/2012 03:22 AM    LDL, calculated 144.6 (H) 11/20/2012 03:22 AM    VLDL, calculated 13.4 11/20/2012 03:22 AM    Triglyceride 67 11/20/2012 03:22 AM    CHOL/HDL Ratio 3.5 11/20/2012 03:22 AM         RISK CALCULATORS:  SCORE RESULT   ASCVD The ASCVD Risk score (Sheri Hermosillo, et al., 2013) failed to calculate for the following reasons:    ASCVD risk score not calculated    [id number]     HAS-BLED    READMISSION RISK SCORE High Risk            23       Total Score        3 Has Seen PCP in Last 6 Months (Yes=3, No=0)    11 IP Visits Last 12 Months (1-3=4, 4=9, >4=11)    9 Pt. Coverage (Medicare=5 , Medicaid, or Self-Pay=4)        Criteria that do not apply:    . Living with Significant Other. Assisted Living. LTAC. SNF.  or   Rehab    Patient Length of Stay (>5 days = 3)    Charlson Comorbidity Score (Age + Comorbid Conditions)           Functional status and cognitive function:    Ambulates with: Walker  Status: alert, cooperative, no distress, appears stated age  Condition: STABLE  Disposition: Home with Home Health    Diet: Diabetic Diet    Code status and advanced care plan: FULL    Point of Contact Mathieu Houser  Relationship: Daughter  [phone]     Patient Education:  Patient was educated on the following topics prior to discharge: Asthma, Diabetes, COPD    Follow-up:   Follow-up Information     Follow up With Specialties Details Why Contact Info Martita Carvalho MD Family Medicine On 8/20/2020 at 10:00 am, arrive at 9:45 am. Please wear face mask.  [email or web address]      Rubio Salazar MD Pulmonary Disease, Internal Medicine On 8/14/2020 at 9:00 am [address] [facility] [address]  [phone]      [address]  chosen to continue managing healthcare needs, your [facility] [address]  [address] Kamila Chapman  [phone]          =================================================================    Jono Mock MD, PGY-1   [address]   Intern Pager: [phone]   August 13, 2020, 9:06 AM

## 2020-08-11 NOTE — COMMUNITY CARE MANAGEMENT
Patient scheduled primary care follow up with Dr. Hamilton Valdivia on 8/20/2020 at 10:00 am, patient should arrive at 9:45 am. Patient should wear a face mask.

## 2020-08-11 NOTE — PROGRESS NOTES
[facility]  Pulmonary, Critical Care, and Sleep Medicine    Pulmonary Medicine Progress Note    Name: Ata Solis MRN: [id number]  : 1960 Hospital: [facility]  Date: 2020       Subjective:  Pt continues to improve. Overall doing well. Denies cough. Up on the side of the bed today. Patient Active Problem List   Diagnosis Code    Essential hypertension, benign I10    Diabetes mellitus, type 2 ([facility] E11.9    Esophageal reflux K21.9    SHERRIE on CPAP G47.33, Z99.89    COPD (chronic obstructive pulmonary disease) ([facility]) J44.9    Allergic rhinitis J30.9    COPD with acute exacerbation ([facility]) J44.1    Obesity, Class III, BMI 40-49.9 (morbid obesity) ([facility]) E66.01    Chest pain R07.9    Dyspnea R06.00    COPD exacerbation ([facility]) J44.1    Acute exacerbation of COPD with asthma ([facility]) J44.1, R27.191    Diastolic CHF, chronic ([facility]) I50.32    Diverticulosis K57.90    COPD with acute bronchitis ([facility]) J44.0, J20.9    Hyperlipidemia E78.5    Type 2 diabetes with nephropathy ([facility]) E11.21    Bilateral carotid bruits R09.89    Palpitations R00.2    Acute exacerbation of chronic obstructive pulmonary disease (COPD) ([facility]) J44.1    Atelectasis of right lung J98.11    Hypoxia R09.02    COVID-19 U07.1    Hypokalemia E87.6    COVID-19 virus infection U07.1    Acute respiratory disease due to COVID-19 virus U07.1, J06.9    Pneumonia due to COVID-19 virus U07.1, J12.89       Assessment:  · Acute exacerbation of COPD/asthma overlap syndrome -- Secondary to recent COVID-19 pneumonia/sepsis- now with bronchitis  · Acute on chronic hypoxic and hypercapnic respiratory failure.  O2 requirements increased to 4 LPM, baseline 2 LPM  · Underlying poorly controlled severe persistent asthma with steroid dependence  · COPD, gold D  · COVID-19 pneumonia/severe sepsis   · Worsening dyspnea/SOB:  Etiology multifactorial, due to above  · Morbid obesity:Body mass index is 47.3 kg/m².  · Obesity hypoventilation syndrome with SHERRIE: Patient on trilogy in the AVAPS AE setting at home  · Chronic steroid dependence  · Previous smoking hx:  Quit 14 years ago  · CHFpEF  · Pulm HTN: [address] groups 2 and 3 disease    Impression/Plan:  · Supplemental oxygen to maintain SpO2 >88%  · Bipap-ST QHS and PRN -- can do AVAPS-ST mode if possible  · duonebs q4h, pulmicort nebs 1mg BID, and albuterol PRN   · Please hold home bronchodilators.  May resume on discharge  · Change to PO steroids. Recommend a slower home taper and the patient feels she got significantly worse after she went from 40mg to 20mg. · Recommend 40qd x 7 days, 30qdx7 days, 20qd x 7 days, then 10qd x7 days. ·  PO ABX to complete a 7 day course. · No further COVID pneumonia treatment- has received Remdesivir and Convalescent plasma  · Maintain net negative fluid balance as renal function tolerates. Diuresis per primary service   · Aggressive pulmonary toileting/bronchial hygiene  · Frequent incentive spirometry  · Aspiration precautions including elevating HOB >30deg  · PT/OT, OOB, ambulate with assistance as tolerated  · DVT ppx per primary service -- advise high intensity ppx with lovenox SQ 40mg BID   · GI ppx due to high dose steroids    Okay to transition home from my perspective. Will sign off. Please call with questions. Already has f/u in our clinic. FiO2 to keep SpO2 >=92%, HOB >=30 degree, aspiration precautions, aggressive pulmonary toileting, incentive spirometry. Other issues management by primary team and respective consultants. Events and notes from last 24 hours reviewed. Discussed with patient and family, answered all questions to their satisfaction. Care plan discussed with nursing.      Labs and images personally seen and available reports reviewed  All current medicines are reviewed       Medications- Current:  Current Facility-Administered Medications   Medication Dose Route Frequency    levoFLOXacin (LEVAQUIN) tablet 750 mg  750 mg Oral Q24H    insulin glargine (LANTUS) injection 45 Units  45 Units SubCUTAneous QHS    [START ON 8/12/2020] lisinopriL (PRINIVIL, ZESTRIL) tablet 40 mg  40 mg Oral DAILY    ipratropium-albuterol (COMBIVENT RESPIMAT) 20 mcg-100 mcg inhalation spray  1 Puff Inhalation Q4H RT    sodium chloride (NS) flush 5-40 mL  5-40 mL IntraVENous PRN    enoxaparin (LOVENOX) injection 40 mg  40 mg SubCUTAneous Q12H    predniSONE (DELTASONE) tablet 40 mg  40 mg Oral DAILY    insulin lispro (HUMALOG) injection   SubCUTAneous AC&HS    acetaminophen (TYLENOL) tablet 500 mg  500 mg Oral Q6H PRN    aspirin delayed-release tablet 81 mg  81 mg Oral DAILY    furosemide (LASIX) tablet 20 mg  20 mg Oral DAILY    hydroCHLOROthiazide (HYDRODIURIL) tablet 12.5 mg  12.5 mg Oral DAILY    loratadine (CLARITIN) tablet 10 mg  10 mg Oral DAILY PRN    montelukast (SINGULAIR) tablet 10 mg  10 mg Oral QHS    tiotropium bromide (SPIRIVA RESPIMAT) 2.5 mcg /actuation  2 Puff Inhalation QAM RT    sodium chloride (NS) flush 5-40 mL  5-40 mL IntraVENous PRN    acetaminophen (TYLENOL) tablet 650 mg  650 mg Oral Q6H PRN    Or    acetaminophen (TYLENOL) suppository 650 mg  650 mg Rectal Q6H PRN    polyethylene glycol (MIRALAX) packet 17 g  17 g Oral DAILY PRN    promethazine (PHENERGAN) tablet 12.5 mg  12.5 mg Oral Q6H PRN    Or    ondansetron (ZOFRAN) injection 4 mg  4 mg IntraVENous Q6H PRN    fluticasone (FLOVENT HFA) 110 mcg inhaler  2 Puff Inhalation BID RT    pantoprazole (PROTONIX) tablet 40 mg  40 mg Oral ACB    glucose chewable tablet 16 g  4 Tab Oral PRN    glucagon (GLUCAGEN) injection 1 mg  1 mg IntraMUSCular PRN    dextrose (D50W) injection syrg 12.5-25 g  25-50 mL IntraVENous PRN       Objective:  Vital Signs:    Visit Vitals  /65   Pulse 75   Temp 97.9 °F (36.6 °C)   Resp 25   Ht 5' 3\" (1.6 m)   Wt 121.1 kg (267 lb)   SpO2 98%   BMI 47.30 kg/m²      O2 Device: Nasal cannula  O2 Flow Rate (L/min): 3 l/min  Temp (24hrs), Av.9 °F (36.6 °C), Min:97.6 °F (36.4 °C), Max:98.1 °F (36.7 °C)      Intake/Output:   Last shift:      No intake/output data recorded. Last 3 shifts:  1901 -  0700  In: 1250 [P.O.:600; I.V.:650]  Out: 1300 [Urine:1300]    Intake/Output Summary (Last 24 hours) at 2020 1235  Last data filed at 2020 0600  Gross per 24 hour   Intake 750 ml   Output 1000 ml   Net -250 ml       Physical Exam:     General/Neurology: Alert, Awake, cooperative. Morbidly obese. Head:   Normocephalic, without obvious abnormality  Eye:   PERRL, EOM intact  Oral:  Mucus membranes moist  Lung:   B/l air entry fair. Scattered expiratory wheezes. No rales. Heart:   S1 S2 present  Abdomen:  Soft, non tender, BS+nt   Extremities:  No pedal edema. Skin:   Dry, intact  Data:      Recent Results (from the past 24 hour(s))   GLUCOSE, POC    Collection Time: 08/10/20  4:03 PM   Result Value Ref Range    Glucose (POC) 276 (H) 70 - 110 mg/dL   GLUCOSE, POC    Collection Time: 08/10/20  9:38 PM   Result Value Ref Range    Glucose (POC) 208 (H) 70 - 110 mg/dL   CBC WITH AUTOMATED DIFF    Collection Time: 20  4:33 AM   Result Value Ref Range    WBC 9.0 4.6 - 13.2 K/uL    RBC 3.86 (L) 4.20 - 5.30 M/uL    HGB 11.8 (L) 12.0 - 16.0 g/dL    HCT 36.1 35.0 - 45.0 %    MCV 93.5 74.0 - 97.0 FL    MCH 30.6 24.0 - 34.0 PG    MCHC 32.7 31.0 - 37.0 g/dL    RDW 14.2 11.6 - 14.5 %    PLATELET 258 527 - 587 K/uL    MPV 9.2 9.2 - 11.8 FL    NEUTROPHILS 66 40 - 73 %    LYMPHOCYTES 26 21 - 52 %    MONOCYTES 8 3 - 10 %    EOSINOPHILS 0 0 - 5 %    BASOPHILS 0 0 - 2 %    ABS. NEUTROPHILS 5.9 1.8 - 8.0 K/UL    ABS. LYMPHOCYTES 2.4 0.9 - 3.6 K/UL    ABS. MONOCYTES 0.8 0.05 - 1.2 K/UL    ABS. EOSINOPHILS 0.0 0.0 - 0.4 K/UL    ABS.  BASOPHILS 0.0 0.0 - 0.1 K/UL    DF AUTOMATED     LACTIC ACID    Collection Time: 20  4:33 AM   Result Value Ref Range    Lactic acid 1.6 0.4 - 2.0 MMOL/L   METABOLIC PANEL, BASIC Collection Time: 08/11/20  4:33 AM   Result Value Ref Range    Sodium 138 136 - 145 mmol/L    Potassium 3.4 (L) 3.5 - 5.5 mmol/L    Chloride 99 (L) 100 - 111 mmol/L    CO2 34 (H) 21 - 32 mmol/L    Anion gap 5 3.0 - 18 mmol/L    Glucose 92 74 - 99 mg/dL    BUN 22 (H) 7.0 - 18 MG/DL    Creatinine 0.93 0.6 - 1.3 MG/DL    BUN/Creatinine ratio 24 (H) 12 - 20      GFR est AA >60 >60 ml/min/1.73m2    GFR est non-AA >60 >60 ml/min/1.73m2    Calcium 10.1 8.5 - 10.1 MG/DL   GLUCOSE, POC    Collection Time: 08/11/20  8:15 AM   Result Value Ref Range    Glucose (POC) 101 70 - 110 mg/dL   SARS-COV-2    Collection Time: 08/11/20  9:45 AM   Result Value Ref Range    Specimen source Nasopharyngeal      COVID-19 rapid test Not detected NOTD      Specimen type NP Swab     GLUCOSE, POC    Collection Time: 08/11/20 11:28 AM   Result Value Ref Range    Glucose (POC) 137 (H) 70 - 110 mg/dL         Chemistry   Recent Labs     08/11/20  0433 08/10/20  0326 08/09/20  0400   GLU 92 249* 234*    135* 136   K 3.4* 4.5 4.3   CL 99* 99* 101   CO2 34* 30 27   BUN 22* 22* 20*   CREA 0.93 1.10 1.03   CA 10.1 9.4 9.3   AGAP 5 6 8   BUCR 24* 20 19       CBC w/Diff   Recent Labs     08/11/20 0433 08/10/20  0326 08/09/20  0400   WBC 9.0 9.6 10.3   RBC 3.86* 3.67* 3.87*   HGB 11.8* 11.4* 11.7*   HCT 36.1 34.4* 36.1    268 258   GRANS 66 87* 86*   LYMPH 26 9* 9*   EOS 0 0 0       ABG   No results for input(s): PHI, PHI, POC2, PCO2I, PO2, PO2I, HCO3, HCO3I, FIO2, FIO2I in the last 72 hours. Micro    No results for input(s): SDES, CULT in the last 72 hours. No results for input(s): CULT in the last 72 hours.     CT (Most Recent)   Results from Hospital Encounter encounter on 07/30/20   CT ABD PELV WO CONT    Addendum Addendum: Addendum:  As mentioned in the initial report but not in impression are extensive peripheral reticular and fibrotic opacities in imaged bilateral lower lung  new since the CT in 4/20 and concerning subacute interstitial pneumonitis. Clinical correlation and chest CT evaluation advised. Rowena Heck MD 7/30/2020 10:38 AM          Narrative CT of abdomen and pelvis without contrast     INDICATION: Increasing pain at the ventral hernia    COMPARISON: CT 4/8/20    TECHNIQUE: 5 mm helical scan to the abdomen and pelvis is obtained  from the  diaphragm to the symphysis pubis without  IV contrast administration. All CT scans at this facility performed using dose optimization techniques as  appreciated to a performed exam, to include automated exposure control,  adjustment of the mA and or KU according to patient size (including appropriate  matching for site specific examination), or use of iterative reconstruction  technique. FINDINGS: The study is suboptimal due to lack of IV contrast.  Subtle  abnormality can be under detected. Lung Bases: Moderate streaky and reticular opacities identified in imaged  bilateral lower lobes, new since the prior study. Atelectasis in right middle  lobe along the fissure. Prominent pericardial fat pad. Liver: Normal.    Gallbladder: Surgically absent. Biliary System: No ductal dilatation. Spleen: Normal.    Pancreas: Normal.    Adrenal Glands: Normal.    Kidneys: Normal.    Bowel: The stomach is filled with food content within no definite abnormality. The small bowel is mildly dilated with air-fluid levels as well as mild mucosal  thickening, especially in the jejunum. The dilatation is extending to the  terminal ileum with no definite transit point. The colon is nondilated with  scattered diverticula throughout. No diverticulitis. The periumbilical hernia is slightly decreased in size from 6.7 x 7.2 cm to 5.8  x 6.3 cm. The small bowel loop within the hernia sac is no longer evident  instead is abutting the hernia. Although, the mesenteric fat within the hernia  sac appears inflamed. Mild hernia wall thickening and minimal fluid appear  unchanged.     Lower genitourinary system: The bladder is poorly distended. The uterus is  surgically absent. Peritoneum/Retroperitoneum: No adenopathy. Vasculature: Moderate atherosclerotic aortic disease. Other: No free fluid. CT OSSEOUS STRUCTURES:       Unremarkable for age. Impression IMPRESSION:     1. The ventral hernia is slightly smaller and the small bowel loop is no longer  seen in the hernia sac but abutting the hernia neck compared to the prior CT in  4/20. The mesenteric within the hernia sac becomes strained, suggesting fatty  inflammation/ischemia. 2. Interval development of mild small bowel dilatation with air-fluid level and  mild mural thickening all the way to the terminal ileum with no evidence of  transit point. The finding could represent enteritis or adynamic ileus. Recommend clinical correlation. 3. Diverticulosis coli. Thank you for this referral.        XR (Most Recent). CXR reviewed by me and compared with previous CXR   Results from Hospital Encounter encounter on 08/07/20   XR CHEST PORT    Narrative EXAM: XR CHEST PORT    INDICATION: 60 years Female. sob/covid. ADDITIONAL HISTORY: None. TECHNIQUE: Frontal view of the chest.    COMPARISON: 7/27/2020    FINDINGS:    The cardiac silhouette is within normal limits in size. There is linear opacities in the peripheral aspect of the bilateral mid and  lower lung zones which are less pronounced than prior. No definite evidence of pleural effusion. No evidence of pneumothorax. Tethering  of the left lateral hemidiaphragm. Osseous structures are unchanged in appearance. Impression IMPRESSION:  Interval improved linear peripheral opacities in the bilateral mid and lower  lung zones, suggesting of scarring although persistent infiltrates are not  excluded. Recommend continued imaging follow-up.        See my orders for details     Total care time exclusive of procedures with complex decision making, coordination of care and counseling patient performed and > 50% time spent in face to face evaluation as mentioned above.     Aleja Pérez MD  Critical Care Medicine

## 2020-08-11 NOTE — PROGRESS NOTES
Bedside and Verbal [facility] (oncoming nurse) by Adina Wen RN (offgoing nurse). Report included the following information SBAR, Kardex, MAR and Recent Results.     SITUATION:    Code Status: Full Code   Reason for Admission: COPD exacerbation ([facility] [J44.1]   Pneumonia due to COVID-19 virus [U07.1, J12.89]    [facility] day: 4   Problem List:       Hospital Problems  Date Reviewed: 7/2/2020          Codes Class Noted POA    Pneumonia due to COVID-19 virus ICD-10-CM: U07.1, J12.89  ICD-9-CM: 480.8  8/7/2020 Unknown        * (Principal) COPD exacerbation ([facility] ICD-10-CM: J44.1  ICD-9-CM: 491.21  2/8/2017 Unknown              BACKGROUND:    Past Medical History:   Past Medical History:   Diagnosis Date    Asthma     Chronic lung disease     COPD     Cystocele, midline     Diabetes mellitus ([facility]     GERD (gastroesophageal reflux disease)     Hidradenitis suppurativa     Hyperlipidemia     Hypertension     SHERRIE on CPAP     CPAP    Stress incontinence          Patient taking anticoagulants yes     ASSESSMENT:    Changes in Assessment Throughout Shift: Patient awaiting for respiratory  C & S and rapid Covid- 19 test.     Patient has Central Line: no Reasons if yes: no   Patient has Dean Cath: no Reasons if yes: voids      Last Vitals:     Vitals:    08/11/20 0500 08/11/20 0600 08/11/20 0746 08/11/20 0800   BP: 133/67 150/73     Pulse: 64 62     Resp: 16 19     Temp:    97.9 °F (36.6 °C)   SpO2: 100% 100% 93%    Weight:       Height:            IV and DRAINS (will only show if present)   [REMOVED] Peripheral IV 08/08/20 Left Forearm-Site Assessment: Clean, dry, & intact  Peripheral IV 08/09/20 Right Antecubital-Site Assessment: Clean, dry, & intact  [REMOVED] Peripheral IV 08/07/20 Left Hand-Site Assessment: Clean, dry, & intact     WOUND (if present)   Wound Type:  none   Dressing present Dressing Present : No   Wound Concerns/Notes:  none     PAIN    Pain Assessment    Pain Intensity 1: 0 (08/11/20 0400)              Patient Stated Pain Goal: 0  o Interventions for Pain:  patient denied any  complaints of pain.  o Intervention effective: yes  o Time of last intervention: 040   o Reassessment Completed: yes      Last 3 Weights:  Last 3 Recorded Weights in this Encounter    08/07/20 1419 08/10/20 0400   Weight: 121.1 kg (267 lb) 121.1 kg (267 lb)     Weight change:      INTAKE/OUPUT    Current Shift: No intake/output data recorded. Last three shifts: 08/09 1901 - 08/11 0700  In: 1250 [P.O.:600; I.V.:650]  Out: 1300 [Urine:1300]     LAB RESULTS     Recent Labs     08/11/20  0433 08/10/20  0326 08/09/20  0400   WBC 9.0 9.6 10.3   HGB 11.8* 11.4* 11.7*   HCT 36.1 34.4* 36.1    268 258        Recent Labs     08/11/20  0433 08/10/20  0326 08/09/20  0400    135* 136   K 3.4* 4.5 4.3   GLU 92 249* 234*   BUN 22* 22* 20*   CREA 0.93 1.10 1.03   CA 10.1 9.4 9.3       RECOMMENDATIONS AND DISCHARGE PLANNING     1. Pending tests/procedures/ Plan of Care or Other Needs: pending repeat Covid-19 test and respiratory panel test     2. Discharge plan for patient and Needs/Barriers: pending    3. Estimated Discharge Date: 08/19/20 Posted on Whiteboard in [facility] Room: yes      4. The patient's care plan was reviewed with the oncoming nurse. \"HEALS\" SAFETY CHECK      Fall Risk    Total Score: 1    Safety Measures: Safety Measures: Bed/Chair-Wheels locked, Bed in low position, Call light within reach, Emergency bedside equipment, Fall prevention (comment), Gripper socks, Side rails X 3    A safety check occurred in the patient's room between off going nurse and oncoming nurse listed above.     The safety check included the below items  Area Items   H  High Alert Medications - Verify all high alert medication drips (heparin, PCA, etc.)   E  Equipment - Suction is set up for ALL patients (with hafsaker)  - Red plugs utilized for all equipment (IV pumps, etc.)  - WOWs wiped down at end of shift.  - Room stocked with oxygen, suction, and other unit-specific supplies   A  Alarms - Bed alarm is set for fall risk patients  - Ensure chair alarm is in place and activated if patient is up in a chair   L  Lines - Check IV for any infiltration  - Dean bag is empty if patient has a Dean   - Tubing and IV bags are labeled   S  Safety   - Room is clean, patient is clean, and equipment is clean. - Hallways are clear from equipment besides carts. - Fall bracelet on for fall risk patients  - Ensure room is clear and free of clutter  - Suction is set up for ALL patients (with yanker)  - Hallways are clear from equipment besides carts.    - Isolation precautions followed, supplies available outside room, sign posted     Carlos Lowry RN

## 2020-08-11 NOTE — PROGRESS NOTES
Approached patient on placing the BiPAP machine on for the evening and the patient declined at this time. Patient still awake receiving a certain medication through IV plus she would like to eat before bed. BiPAP by bedside. Will be back at a later time.

## 2020-08-12 VITALS
BODY MASS INDEX: 44.65 KG/M2 | WEIGHT: 252 LBS | RESPIRATION RATE: 20 BRPM | HEIGHT: 63 IN | TEMPERATURE: 97.4 F | OXYGEN SATURATION: 94 % | DIASTOLIC BLOOD PRESSURE: 81 MMHG | SYSTOLIC BLOOD PRESSURE: 145 MMHG | HEART RATE: 87 BPM

## 2020-08-12 LAB
ANION GAP SERPL CALC-SCNC: 5 MMOL/L (ref 3–18)
BASOPHILS # BLD: 0.1 K/UL (ref 0–0.1)
BASOPHILS NFR BLD: 1 % (ref 0–2)
BUN SERPL-MCNC: 20 MG/DL (ref 7–18)
BUN/CREAT SERPL: 23 (ref 12–20)
CALCIUM SERPL-MCNC: 9.6 MG/DL (ref 8.5–10.1)
CHLORIDE SERPL-SCNC: 98 MMOL/L (ref 100–111)
CO2 SERPL-SCNC: 35 MMOL/L (ref 21–32)
CREAT SERPL-MCNC: 0.88 MG/DL (ref 0.6–1.3)
DIFFERENTIAL METHOD BLD: ABNORMAL
EOSINOPHIL # BLD: 0 K/UL (ref 0–0.4)
EOSINOPHIL NFR BLD: 0 % (ref 0–5)
ERYTHROCYTE [DISTWIDTH] IN BLOOD BY AUTOMATED COUNT: 14 % (ref 11.6–14.5)
GLUCOSE BLD STRIP.AUTO-MCNC: 113 MG/DL (ref 70–110)
GLUCOSE BLD STRIP.AUTO-MCNC: 87 MG/DL (ref 70–110)
GLUCOSE SERPL-MCNC: 169 MG/DL (ref 74–99)
HCT VFR BLD AUTO: 38.8 % (ref 35–45)
HGB BLD-MCNC: 13 G/DL (ref 12–16)
LACTATE SERPL-SCNC: 1.8 MMOL/L (ref 0.4–2)
LYMPHOCYTES # BLD: 2.1 K/UL (ref 0.9–3.6)
LYMPHOCYTES NFR BLD: 24 % (ref 21–52)
MCH RBC QN AUTO: 31.1 PG (ref 24–34)
MCHC RBC AUTO-ENTMCNC: 33.5 G/DL (ref 31–37)
MCV RBC AUTO: 92.8 FL (ref 74–97)
MONOCYTES # BLD: 0.7 K/UL (ref 0.05–1.2)
MONOCYTES NFR BLD: 8 % (ref 3–10)
NEUTS SEG # BLD: 6.1 K/UL (ref 1.8–8)
NEUTS SEG NFR BLD: 67 % (ref 40–73)
PLATELET # BLD AUTO: 293 K/UL (ref 135–420)
PMV BLD AUTO: 9 FL (ref 9.2–11.8)
POTASSIUM SERPL-SCNC: 3.5 MMOL/L (ref 3.5–5.5)
RBC # BLD AUTO: 4.18 M/UL (ref 4.2–5.3)
SODIUM SERPL-SCNC: 138 MMOL/L (ref 136–145)
WBC # BLD AUTO: 9 K/UL (ref 4.6–13.2)

## 2020-08-12 PROCEDURE — 80048 BASIC METABOLIC PNL TOTAL CA: CPT

## 2020-08-12 PROCEDURE — 83605 ASSAY OF LACTIC ACID: CPT

## 2020-08-12 PROCEDURE — 74011250637 HC RX REV CODE- 250/637: Performed by: STUDENT IN AN ORGANIZED HEALTH CARE EDUCATION/TRAINING PROGRAM

## 2020-08-12 PROCEDURE — 74011250636 HC RX REV CODE- 250/636: Performed by: STUDENT IN AN ORGANIZED HEALTH CARE EDUCATION/TRAINING PROGRAM

## 2020-08-12 PROCEDURE — 36415 COLL VENOUS BLD VENIPUNCTURE: CPT

## 2020-08-12 PROCEDURE — 82962 GLUCOSE BLOOD TEST: CPT

## 2020-08-12 PROCEDURE — 3331090001 HH PPS REVENUE CREDIT

## 2020-08-12 PROCEDURE — 94640 AIRWAY INHALATION TREATMENT: CPT

## 2020-08-12 PROCEDURE — 3331090002 HH PPS REVENUE DEBIT

## 2020-08-12 PROCEDURE — 74011636637 HC RX REV CODE- 636/637: Performed by: INTERNAL MEDICINE

## 2020-08-12 PROCEDURE — 74011250637 HC RX REV CODE- 250/637: Performed by: HOSPITALIST

## 2020-08-12 PROCEDURE — 85025 COMPLETE CBC W/AUTO DIFF WBC: CPT

## 2020-08-12 RX ORDER — PREDNISONE 20 MG/1
40 TABLET ORAL DAILY
Qty: 10 TAB | Refills: 0 | Status: SHIPPED | OUTPATIENT
Start: 2020-08-12 | End: 2020-08-14

## 2020-08-12 RX ORDER — LEVOFLOXACIN 750 MG/1
750 TABLET ORAL EVERY 24 HOURS
Qty: 3 TAB | Refills: 0 | Status: SHIPPED | OUTPATIENT
Start: 2020-08-12 | End: 2020-08-15

## 2020-08-12 RX ADMIN — PREDNISONE 40 MG: 20 TABLET ORAL at 08:47

## 2020-08-12 RX ADMIN — IPRATROPIUM BROMIDE AND ALBUTEROL 1 PUFF: 20; 100 SPRAY, METERED RESPIRATORY (INHALATION) at 07:13

## 2020-08-12 RX ADMIN — FLUTICASONE PROPIONATE 2 PUFF: 110 AEROSOL, METERED RESPIRATORY (INHALATION) at 07:13

## 2020-08-12 RX ADMIN — ASPIRIN 81 MG: 81 TABLET, COATED ORAL at 08:47

## 2020-08-12 RX ADMIN — LISINOPRIL 40 MG: 40 TABLET ORAL at 08:47

## 2020-08-12 RX ADMIN — IPRATROPIUM BROMIDE AND ALBUTEROL 1 PUFF: 20; 100 SPRAY, METERED RESPIRATORY (INHALATION) at 11:16

## 2020-08-12 RX ADMIN — HYDROCHLOROTHIAZIDE 12.5 MG: 25 TABLET ORAL at 08:48

## 2020-08-12 RX ADMIN — LEVOFLOXACIN 750 MG: 750 TABLET, FILM COATED ORAL at 07:52

## 2020-08-12 RX ADMIN — ENOXAPARIN SODIUM 40 MG: 40 INJECTION SUBCUTANEOUS at 10:26

## 2020-08-12 RX ADMIN — PANTOPRAZOLE 40 MG: 40 TABLET, DELAYED RELEASE ORAL at 07:51

## 2020-08-12 RX ADMIN — FUROSEMIDE 20 MG: 20 TABLET ORAL at 08:48

## 2020-08-12 RX ADMIN — TIOTROPIUM BROMIDE INHALATION SPRAY 2 PUFF: 3.12 SPRAY, METERED RESPIRATORY (INHALATION) at 07:13

## 2020-08-12 NOTE — PROGRESS NOTES
[facility] Medicine  Progress Note    Patient: Debora Velasquez MRN: [id number]   SSN: [id number]  YOB: 1960   Age: 60 y.o. Sex: female      Admit Date: 8/7/2020    LOS: 5 days   Chief Complaint   Patient presents with    Other     Covid positive    Shortness of Breath       Subjective:     No acute events overnight. She feels good this morning. Yesterday she had requested not to be on BiPAP overnight because she wanted to easily use the bathroom at night. Denies urinary frequency/urgency, dysuria. Denies any SOB beyond her baseline. Continued to feel SOB with standing/ambulation. Some coughing, non-productive. Chest tightness/soreness is better today, she thinks Combivent is more effective than any of her home medications to control pleural pain and SOB. She denied any chest pain, palpitations, fever/chills, sore throat, abdominal pain, HA. She thinks she is back to her baseline now, feels ready to go home today. ROS as noted above    Objective:     Visit Vitals  /86 (BP 1 Location: Left arm, BP Patient Position: Sitting)   Pulse 69   Temp 97.5 °F (36.4 °C)   Resp 20   Ht 5' 3\" (1.6 m)   Wt 114.3 kg (252 lb)   SpO2 96%   BMI 44.64 kg/m²       Physical Exam:  General: chronically ill-appearnig, NAD, alert, friendly and communicative  HEENT: EOMI, mouth moist and pink  CV:  RRR, no M/G/R  RESP: Non labored breathing on 3L NC. Decreased BS diffusely, mild diffuse expiratory wheezes heard bilaterally improved over yesterday. ABD:  Soft. umbilical hernia TTP but reducible, Normoactive bowel sounds. MS:  No joint deformity or instability. Neuro: A+Ox3. Ext:  Trace edema in the LE bilaterally. 1+ dp pulses bilaterally. Sensation intact in the LE bilaterally  Skin: warm, dry   Psych: normal mood and affect       Intake and Output:  Current Shift: No intake/output data recorded. Last three shifts: 08/10 1901 - 08/12 0700  In: 1020 [P.O.:870;  I.V.:150]  Out: 3600 [Urine:3600]    Lab/Data Review:  Recent Results (from the past 12 hour(s))   GLUCOSE, POC    Collection Time: 08/11/20  9:22 PM   Result Value Ref Range    Glucose (POC) 151 (H) 70 - 110 mg/dL   CBC WITH AUTOMATED DIFF    Collection Time: 08/12/20  1:57 AM   Result Value Ref Range    WBC 9.0 4.6 - 13.2 K/uL    RBC 4.18 (L) 4.20 - 5.30 M/uL    HGB 13.0 12.0 - 16.0 g/dL    HCT 38.8 35.0 - 45.0 %    MCV 92.8 74.0 - 97.0 FL    MCH 31.1 24.0 - 34.0 PG    MCHC 33.5 31.0 - 37.0 g/dL    RDW 14.0 11.6 - 14.5 %    PLATELET 814 737 - 548 K/uL    MPV 9.0 (L) 9.2 - 11.8 FL    NEUTROPHILS 67 40 - 73 %    LYMPHOCYTES 24 21 - 52 %    MONOCYTES 8 3 - 10 %    EOSINOPHILS 0 0 - 5 %    BASOPHILS 1 0 - 2 %    ABS. NEUTROPHILS 6.1 1.8 - 8.0 K/UL    ABS. LYMPHOCYTES 2.1 0.9 - 3.6 K/UL    ABS. MONOCYTES 0.7 0.05 - 1.2 K/UL    ABS. EOSINOPHILS 0.0 0.0 - 0.4 K/UL    ABS. BASOPHILS 0.1 0.0 - 0.1 K/UL    DF AUTOMATED     LACTIC ACID    Collection Time: 08/12/20  1:57 AM   Result Value Ref Range    Lactic acid 1.8 0.4 - 2.0 MMOL/L   METABOLIC PANEL, BASIC    Collection Time: 08/12/20  1:57 AM   Result Value Ref Range    Sodium 138 136 - 145 mmol/L    Potassium 3.5 3.5 - 5.5 mmol/L    Chloride 98 (L) 100 - 111 mmol/L    CO2 35 (H) 21 - 32 mmol/L    Anion gap 5 3.0 - 18 mmol/L    Glucose 169 (H) 74 - 99 mg/dL    BUN 20 (H) 7.0 - 18 MG/DL    Creatinine 0.88 0.6 - 1.3 MG/DL    BUN/Creatinine ratio 23 (H) 12 - 20      GFR est AA >60 >60 ml/min/1.73m2    GFR est non-AA >60 >60 ml/min/1.73m2    Calcium 9.6 8.5 - 10.1 MG/DL   GLUCOSE, POC    Collection Time: 08/12/20  7:19 AM   Result Value Ref Range    Glucose (POC) 87 70 - 110 mg/dL       RECENT RESULTS  MODALITY IMPRESSION   XR Results from Hospital Encounter encounter on 08/07/20   XR CHEST PORT    Narrative EXAM: XR CHEST PORT    INDICATION: 60 years Female. sob/covid. ADDITIONAL HISTORY: None.     TECHNIQUE: Frontal view of the chest.    COMPARISON: 7/27/2020    FINDINGS:    The cardiac silhouette is within normal limits in size. There is linear opacities in the peripheral aspect of the bilateral mid and  lower lung zones which are less pronounced than prior. No definite evidence of pleural effusion. No evidence of pneumothorax. Tethering  of the left lateral hemidiaphragm. Osseous structures are unchanged in appearance. Impression IMPRESSION:  Interval improved linear peripheral opacities in the bilateral mid and lower  lung zones, suggesting of scarring although persistent infiltrates are not  excluded. Recommend continued imaging follow-up. CT Results from [place] encounter on 07/30/20   CT ABD PELV WO CONT    Addendum Addendum: Addendum:  As mentioned in the initial report but not in impression are extensive peripheral reticular and fibrotic opacities in imaged bilateral lower lung  new since the CT in 4/20 and concerning subacute interstitial pneumonitis. Clinical correlation and chest CT evaluation advised. Christine Oconnor MD 7/30/2020 10:38 AM          Narrative CT of abdomen and pelvis without contrast     INDICATION: Increasing pain at the ventral hernia    COMPARISON: CT 4/8/20    TECHNIQUE: 5 mm helical scan to the abdomen and pelvis is obtained  from the  diaphragm to the symphysis pubis without  IV contrast administration. All CT scans at this facility performed using dose optimization techniques as  appreciated to a performed exam, to include automated exposure control,  adjustment of the mA and or KU according to patient size (including appropriate  matching for site specific examination), or use of iterative reconstruction  technique. FINDINGS: The study is suboptimal due to lack of IV contrast.  Subtle  abnormality can be under detected. Lung Bases: Moderate streaky and reticular opacities identified in imaged  bilateral lower lobes, new since the prior study. Atelectasis in right middle  lobe along the fissure.  Prominent pericardial fat pad.    Liver: Normal.    Gallbladder: Surgically absent. Biliary System: No ductal dilatation. Spleen: Normal.    Pancreas: Normal.    Adrenal Glands: Normal.    Kidneys: Normal.    Bowel: The stomach is filled with food content within no definite abnormality. The small bowel is mildly dilated with air-fluid levels as well as mild mucosal  thickening, especially in the jejunum. The dilatation is extending to the  terminal ileum with no definite transit point. The colon is nondilated with  scattered diverticula throughout. No diverticulitis. The periumbilical hernia is slightly decreased in size from 6.7 x 7.2 cm to 5.8  x 6.3 cm. The small bowel loop within the hernia sac is no longer evident  instead is abutting the hernia. Although, the mesenteric fat within the hernia  sac appears inflamed. Mild hernia wall thickening and minimal fluid appear  unchanged. Lower genitourinary system: The bladder is poorly distended. The uterus is  surgically absent. Peritoneum/Retroperitoneum: No adenopathy. Vasculature: Moderate atherosclerotic aortic disease. Other: No free fluid. CT OSSEOUS STRUCTURES:       Unremarkable for age. Impression IMPRESSION:     1. The ventral hernia is slightly smaller and the small bowel loop is no longer  seen in the hernia sac but abutting the hernia neck compared to the prior CT in  4/20. The mesenteric within the hernia sac becomes strained, suggesting fatty  inflammation/ischemia. 2. Interval development of mild small bowel dilatation with air-fluid level and  mild mural thickening all the way to the terminal ileum with no evidence of  transit point. The finding could represent enteritis or adynamic ileus. Recommend clinical correlation. 3. Diverticulosis coli. Thank you for this referral.       MRI No results found for this or any previous visit.    ULTRASOUND Results from [place] encounter on 04/10/17   US ABD COMP    Impression IMPRESSION:       1. Echogenic mildly enlarged liver, suggestive of hepatic steatosis. No focal  hepatic lesion identified. 2. Status post cholecystectomy. No biliary ductal dilatation. Cardiology Procedures/Testing:  MODALITY RESULTS   EKG Results for orders placed or performed during the hospital encounter of 08/07/20   EKG, 12 LEAD, INITIAL   Result Value Ref Range    Ventricular Rate 80 BPM    Atrial Rate 80 BPM    P-R Interval 142 ms    QRS Duration 90 ms    Q-T Interval 378 ms    QTC Calculation (Bezet) 435 ms    Calculated P Axis 115 degrees    Calculated T Axis 108 degrees    Diagnosis       Sinus rhythm with premature atrial complexes  Low voltage QRS  Cannot rule out Anterior infarct (cited on or before 22-JUL-2020)  Nonspecific T wave abnormality  Abnormal ECG  When compared with ECG of 07-AUG-2020 15:24,  premature atrial complexes are now present  Confirmed by Zayra Simmons ([id number]) on 8/9/2020 10:00:13 AM         ECHO 05/21/20   ECHO ADULT COMPLETE 05/25/2020 5/25/2020    Narrative · Image quality for this study was technically difficult. Contrast used:   DEFINITY. · Normal cavity size and systolic function (ejection fraction normal). Moderate concentric hypertrophy. Estimated left ventricular ejection   fraction is 65 - 70%. Visually measured ejection fraction. No regional   wall motion abnormality noted. Moderate (grade 2) left ventricular   diastolic dysfunction. · Mildly dilated left atrium. · Pulmonary hypertension. Pulmonary arterial systolic pressure is 61 mmHg. · Severely elevated central venous pressure (15+ mmHg); IVC diameter is   larger than 21 mm and collapses less than 50% with respiration.         Signed by: Sánchez Kirby MD        Special Testing/Procedures:  MODALITY RESULTS   MICRO All Micro Results     Procedure Component Value Units Date/Time    CULTURE, BLOOD [[id number]] Collected:  08/07/20 1525    Order Status:  Completed Specimen:  Blood Updated:  08/12/20 0710 Special Requests: NO SPECIAL REQUESTS        Culture result: NO GROWTH 5 DAYS       CULTURE, BLOOD [[id number]] Collected:  08/07/20 1510    Order Status:  Completed Specimen:  Blood Updated:  08/12/20 0710     Special Requests: NO SPECIAL REQUESTS        Culture result: NO GROWTH 5 DAYS       CULTURE, RESPIRATORY/SPUTUM/BRONCH [place] Filter [[id number]]     Order Status:  Sent Specimen:  Sputum     CULTURE, RESPIRATORY/SPUTUM/BRONCH [place] Filter [[id number]] Collected:  08/07/20 2115    Order Status:  Canceled Specimen:  Sputum          UA No results found for this or any previous visit. Telemetry yes   Oxygen NC/BiPAP     Assessment and Plan:     60 y.o. female with PMH of COPD on home O2 (3L NC), IDDM2, HTN, HFpEF, SHERRIE on CPAP, GERD, allergic rhinitis, recent prior admission for COVID pneumonia coupled with acute on chronic hypoxic respiratory failure, now admitted with worsening dyspnea.     Acute COPD Exacerbation/Asthma overlap syndrome. Improving, at baseline now. Pt w/ hx of positive COVID swab (7/7, 7/22), but negative on admission (8/7, 8/11). S/p Remdesivir and Convalescnet plasma. Patient has COPD on max therapy w/ strong asthma component. Hospitalized 6x in 2020 for COPD exacerbations. Possibly had a superimposed lung infection given elevated lactic acid, green sputum. No leukocytosis, although neutrophils 90%. Pending sputum culture, is on Levaquin, Zosyn. Continues to have chest tightness, but no cardiac concerns as troponins were negative and BNP is at baseline. [facility] [place] 8/7 showed interval improved linear peripheral opacities in the bilateral mid and lower lung zones.   - Pulm following, appreciate recs  - Transferred to telemetry  - Sputum Culture  - continue Levaquin 750mg PO  - s/p zosyn  - Prednisone PO.  Taper plan per pulm: 40qd x 7 days, 30qdx7 days, 20qd x 7 days, then 10qd x7 days  - duonebs q4 hours   - continue Advair and spiriva  - albuterol prn   - hold home bronchodilators   - hold home azithromycin M, W, F   - Daily CBC, BMP   - Lovenox 40 mg for DVT prophylaxis BID  - Supplemental oxygen with goal of 88-92%  - Vital signs per unit routine  - Monitor I/Os  - PT/OT already evaluated,appreciate input . Continue working with CM     Insulin dependent Type 2 Diabetes  Home lantus 45u pm and novolog SSI  -Continue Lantus at 45 U; steroids likely contributing to hyperglycemia  -SSI   -Accuchecks ACHS  -Diabetic diet  -Diabetic educator seen yesterday, appreciate recs.     Hypertension, HFpEF: ECHO (5/24/20) HV 25 - 70%. No regional wall motion abnormality. Moderate (grade 2) left ventricular diastolic dysfunction. Mildly dilated left atrium. Pulmonary hypertension. Pulmonary arterial systolic pressure is 61 mmHg. Severely elevated central venous pressure (15+ mmHg); IVC diameter is larger than 21 mm and collapses less than 50% with respiration. SBP in ED elevated  to 129s-139s.  Pt on lisinopril 20mg BID and HCTZ 12.5mg daily at home.  - Continue Lasix 20mg daily  - Continue Lisinopril 20mg BID  - Continue HCTZ 12.5 mg daily     GERD: Currently asymptomatic   -Continue omeprazole 40mg daily     Morbid obesity  -Diabetic diet       Diet diabetic   DVT Prophylaxis lovenox   GI Prophylaxis omeprazole   Code status Full   Disposition Home with [address] of Contact Yusuf Trinchera  Relationship: Daughter  [phone]     Esther Metz MD, PGY1   [address]   Intern Pager: [phone]   August 12, 2020, 11:15 AM

## 2020-08-12 NOTE — ACP (ADVANCE CARE PLANNING)
Advance Care Planning     Advance Care Planning Clinical Specialist  Conversation Note      Date of ACP Conversation: 8/7/2020    [facility] Conducted with:   Patient with Decision Making Capacity    ACP Clinical Specialist: Ольга Pitts    *When Decision Maker makes decisions on behalf of the incapacitated patient: Decision Maker is asked to consider and make decisions based on patient values, known preferences, or best interests. Health Care Decision Maker:    Current Designated Health Care Decision Maker:   Primary Decision Maker: Caryn Curran - Daughter - [phone]    Secondary Decision Maker: Asad Mccain - Sister - [phone]  (If there is a [address] named in the \"Healthcare Decision Makers\" box in the ACP activity, but it is not visible above, be sure to open that field and then select the health care decision maker relationship (ie \"primary\") in the blank space to the right of the name.) Validate  this information as still accurate & up-to-date; edit [place] field as needed.)    Note: Assess and validate information in current ACP documents, as indicated. Note: If the relationship of these Decision-Makers to the patient does NOT follow your state's Next of Kin hierarchy, recommend that patient complete ACP document that meets state-specific requirements to allow them to act on the patient's behalf when appropriate. Care Preferences    Hospitalization: \"If your health worsens and it becomes clear that your chance of recovery is unlikely, what would your preference be regarding hospitalization? \"    Choice:  [x]  The patient wants hospitalization  []  The patient prefers comfort-focused treatment without hospitalization      NOTE: If the patient has a valid advance directive AND now provides care preference(s) that are inconsistent with that prior directive, advise the patient to consider either: creating a new advance directive that complies with state-specific requirements; or, if that is not possible, orally revoking that prior directive in accordance with state-specific requirements, which must be documented in the EHR. [] Yes  [] No   Educated Patient / Sarahy Westley regarding differences between Advance Directives and portable DNR orders.     Length of ACP Conversation in minutes:      Conversation Outcomes:  [] ACP discussion completed  [] Existing advance directive reviewed with patient; no changes to patient's previously recorded wishes   [] New Advance Directive completed   [] Portable Do Not Rescitate prepared for Provider review and signature  [] POLST/POST/MOLST/MOST prepared for Provider review and signature      Follow-up plan:    [] Schedule follow-up conversation to continue planning  [x] Referred individual to Provider for additional questions/concerns   [] Advised patient/agent/surrogate to review completed ACP document and update if needed with changes in condition, patient preferences or care setting     [] This note routed to one or more involved healthcare providers    Georgiana Griffin RN BSN  Care Manager  [phone]

## 2020-08-12 NOTE — PROGRESS NOTES
[facility]  Brief Progress Note    Discussed with Dr. Keily Blum of Pul regarding discharge inhalation medications. She will be sent home home with Advair 230-21 BID, Spiriva 2.5mcg BID, Proair PRN and Combivent q4h PRN. Patient seen again this afternoon, she is sitting up in her chair. Breathing comfortably on 2L NC, enjoyed her lunch, denied chest pain, SOB. Went over her home inhalers, instructed her to discontinue duoneb PRN as well as Flovent. She verbally verified back to me the inhalers she will take at home, and frequency. She has no other concerns that need to be addressed before discharge.     Jono Mock MD, PGY-1   [facility] Medicine      August 12, 2020, 1:00 PM

## 2020-08-12 NOTE — ROUTINE PROCESS
As part of the discharge instructions, medications already given today were discussed with the patient. The next dose due of all ordered meds was highlighted as part of the medication discharge instructions. Discussed with the patient the importance of taking medications as directed, as well as the side effects and adverse reactions to medications ordered. Acknowledged understanding. Waiting for sister to .   1400 Discharged to car via wheelchair

## 2020-08-12 NOTE — PROGRESS NOTES
Arrived to place pt on bipap and she was concerned about being able to use the restroom during the night. I explained to her that she will have to call for help when she needs to use the restroom. She said she would prefer to stay on nasal cannula tonight, and will call if she feels SOB or anything.   RN notified

## 2020-08-12 NOTE — HOME CARE
Discharge noted for today. Patient active to [facility]. New orders noted for SN, PT, and OT. [facility] will continue to follow.     Yadira Gonzalez LPN   [facility] Liaison  [phone]

## 2020-08-12 NOTE — ROUTINE PROCESS
Bedside and Verbal shift change report given to Fela Asif (oncoming nurse) by Alexis Gil (offgoing nurse). Report included the following information SBAR, Kardex, Intake/Output, MAR and Recent Results.

## 2020-08-12 NOTE — DISCHARGE INSTRUCTIONS
Patient Education        Using a Metered-Dose Inhaler: Care Instructions  Your Care Instructions     A metered-dose inhaler lets you breathe medicine into your lungs quickly. Inhaled medicine works faster than the same medicine in a pill. An inhaler allows you to take less medicine than you would need if you took it as a pill. \"Metered-dose\" means that the inhaler gives a measured amount of medicine each time you use it. A metered-dose inhaler gives medicine in the form of a liquid mist.  Your doctor may want you to use a spacer with your inhaler. A spacer is a chamber that you attach to the inhaler. The chamber holds the medicine before you inhale it. That way, you can inhale the medicine in as many breaths as you need. Doctors recommend using a spacer with most metered-dose inhalers, especially those with corticosteroid medicines. Follow-up care is a key part of your treatment and safety. Be sure to make and go to all appointments, and call your doctor if you are having problems. It's also a good idea to know your test results and keep a list of the medicines you take. How can you care for yourself at home? To get started using your inhaler  · Talk with your health care provider to be sure you are using your inhaler the right way. It might help if you practice using it in front of a mirror. Use the inhaler exactly as prescribed. · Check that you have the correct medicine. If you use more than one inhaler, put a label on each one. This will let you know which one to use at the right time. · Keep track of how much medicine is in the inhaler. Check the label to see how many doses are in the container. If you know how many puffs you can take, you can replace the inhaler before you run out. Ask your health care provider how you can keep track of how much medicine is left. · Use a spacer if you have problems pressing the inhaler and breathing in at the same time.  You also may need a spacer if you are using corticosteroid medicines. · If you are using a corticosteroid inhaler, gargle and rinse out your mouth with water after use. Do not swallow the water. Swallowing the water will increase the chance that the medicine will get into your bloodstream. This may make it more likely that you will have side effects. To use a spacer with an inhaler  1. Shake the inhaler. Remove the inhaler cap, and place the mouthpiece of the inhaler into the spacer. Check the inhaler instructions to see if you need to prime your inhaler before you use it. If it needs priming, follow the instructions on how to prime your inhaler. 2. Remove the cap from the spacer. 3. Hold the inhaler upright with the mouthpiece at the bottom. 4. Tilt your head back a little, and breathe out slowly and completely. 5. Place the spacer's mouthpiece in your mouth. 6. Press down on the inhaler to spray one puff of medicine into the spacer, and then start breathing in slowly. Wait to inhale until after you have pressed down on the inhaler. Some spacers have a whistle. If you hear it, you should breathe in more slowly. 7. Hold your breath for 10 seconds. This will let the medicine settle in your lungs. 8. If you need to take a second dose, wait 30 to 60 seconds to allow the inhaler valve to refill. To use an inhaler without a spacer  1. Shake the inhaler as directed. Remove the cap. Check the instructions to see if you need to prime your inhaler before you use it. If it needs priming, follow the instructions on how to prime your inhaler. 2. Hold the inhaler upright with the mouthpiece at the bottom. 3. Tilt your head back a little, and breathe out slowly and completely. 4. Position the inhaler in one of two ways:  ? You can place the inhaler in your mouth. This is easier for most people. And it lowers the risk that any of the medicine will get into your eyes.   ? Or you can place the inhaler 1 to 2 inches in front of your open mouth, without closing your lips over it. Try to open your mouth as wide as you can. Placing the inhaler in front of your open mouth may be better for getting the medicine into your lungs. But some people may find this too hard to do. 5. Start taking slow, even breaths through your mouth. Press down on the inhaler once, then inhale fully. 6. Hold your breath for 10 seconds. This will let the medicine settle in your lungs. 7. If you need to take a second dose, wait 30 to 60 seconds to allow the inhaler valve to refill. Where can you learn more? Go to http://etelvina-olivier.info/  Enter K111 in the search box to learn more about \"Using a Metered-Dose Inhaler: Care Instructions. \"  Current as of: February 24, 2020               Content Version: 12.5  © 4099-7891 [employer]. Care instructions adapted under license by [employer] (which disclaims liability or warranty for this information). If you have questions about a medical condition or this instruction, always ask your healthcare professional. [place] [address] any warranty or liability for your use of this information. Patient Education        COPD Exacerbation Plan: Care Instructions  Your Care Instructions     If you have chronic obstructive pulmonary disease (COPD), your usual shortness of breath could suddenly get worse. You may start coughing more and have more mucus. This flare-up is called a COPD exacerbation (say \"fu-LJQ-em-BAY-shun\"). A lung infection or air pollution could set off an exacerbation. Sometimes it can happen after a quick change in temperature or being around chemicals. Work with your doctor to make a plan for dealing with an exacerbation. You can better manage it if you plan ahead. Follow-up care is a key part of your treatment and safety. Be sure to make and go to all appointments, and call your doctor if you are having problems.  It's also a good idea to know your test results and keep a list of the medicines you take. How can you care for yourself at home? During an exacerbation  · Do not panic if you start to have one. Quick treatment at home may help you prevent serious breathing problems. If you have a COPD exacerbation plan that you developed with your doctor, follow it. · Take your medicines exactly as your doctor tells you.  ? Use your inhaler as directed by your doctor. If your symptoms do not get better after you use your medicine, have someone take you to the emergency room. Call an ambulance if necessary. ? With inhaled medicines, a spacer or a nebulizer may help you get more medicine to your lungs. Ask your doctor or pharmacist how to use them properly. Practice using the spacer in front of a mirror before you have an exacerbation. This may help you get the medicine into your lungs quickly. ? If your doctor has given you steroid pills, take them as directed. ? Your doctor may have given you a prescription for antibiotics, which you can fill if you need it. ? Talk to your doctor if you have any problems with your medicine. And call your doctor if you have to use your antibiotic or steroid pills. Preventing an exacerbation  · Do not smoke. This is the most important step you can take to prevent more damage to your lungs and prevent problems. If you already smoke, it is never too late to stop. If you need help quitting, talk to your doctor about stop-smoking programs and medicines. These can increase your chances of quitting for good. · Take your daily medicines as prescribed. · Avoid colds and flu. ? Get a pneumococcal vaccine. ? Get a flu vaccine each year, as soon as it is available. Ask those you live or work with to do the same, so they will not get the flu and infect you. ? Try to stay away from people with colds or the flu. ? Wash your hands often. · Avoid secondhand smoke; air pollution; cold, dry air; hot, humid air; and high altitudes.  Stay at home with your windows closed when air pollution is bad. · Learn breathing techniques for COPD, such as breathing through pursed lips. These techniques can help you breathe easier during an exacerbation. When should you call for help? [id number] anytime you think you may need emergency care. For example, call if:  · You have severe trouble breathing. · You have severe chest pain. Call your doctor now or seek immediate medical care if:  · You have new or worse shortness of breath. · You develop new chest pain. · You are coughing more deeply or more often, especially if you notice more mucus or a change in the color of your mucus. · You cough up blood. · You have new or increased swelling in your legs or belly. · You have a fever. Watch closely for changes in your health, and be sure to contact your doctor if:  · You need to use your antibiotic or steroid pills. · Your symptoms are getting worse. Where can you learn more? Go to http://etelvinaLas traperasolivier.info/  Enter U536 in the search box to learn more about \"COPD Exacerbation Plan: Care Instructions. \"  Current as of: February 24, 2020               Content Version: 12.5  © 5505-0812 [employer]. Care instructions adapted under license by [employer] (which disclaims liability or warranty for this information). If you have questions about a medical condition or this instruction, always ask your healthcare professional. [address] any warranty or liability for your use of this information. Patient {ARMBANDS:50864}    DISCHARGE SUMMARY from Nurse    PATIENT INSTRUCTIONS:    What to do at Home:  Recommended activity: Activity as tolerated,     If you experience any of the following symptoms shortness not relieved by your inhalers, please follow up with nearest emergency room or primary care physician. *  Please give a list of your current medications to your Primary Care Provider.     *  Please update this list whenever your medications are discontinued, doses are      changed, or new medications (including over-the-counter products) are added. *  Please carry medication information at all times in case of emergency situations. These are general instructions for a healthy lifestyle:    No smoking/ No tobacco products/ Avoid exposure to second hand smoke  Surgeon General's Warning:  Quitting smoking now greatly reduces serious risk to your health. Obesity, smoking, and sedentary lifestyle greatly increases your risk for illness    A healthy diet, regular physical exercise & weight monitoring are important for maintaining a healthy lifestyle    You may be retaining fluid if you have a history of heart failure or if you experience any of the following symptoms:  Weight gain of 3 pounds or more overnight or 5 pounds in a week, increased swelling in our hands or feet or shortness of breath while lying flat in bed. Please call your doctor as soon as you notice any of these symptoms; do not wait until your next office visit. The discharge information has been reviewed with the patient. The patient verbalized understanding. Discharge medications reviewed with the patient and appropriate educational materials and side effects teaching were provided. ___________________________________________________________________________________________________________________________________  DISCHARGE SUMMARY from Nurse    PATIENT INSTRUCTIONS:          The discharge information has been reviewed with the {PATIENT PARENT GUARDIAN:62875}. The {PATIENT PARENT GUARDIAN:10125} verbalized understanding. Discharge medications reviewed with the {Dishcarge meds reviewed KMQU:43666} and appropriate educational materials and side effects teaching were provided.   ___________________________________________________________________________________________________________________________________     Chronic Obstructive Pulmonary Disease (COPD): Care Instructions  Your Care Instructions     Chronic obstructive pulmonary disease (COPD) is a general term for a group of lung diseases, including emphysema and chronic bronchitis. People with COPD have decreased airflow in and out of the lungs, which makes it hard to breathe. The airways also can get clogged with thick mucus. Cigarette smoking is a major cause of COPD. Although there is no cure for COPD, you can slow its progress. Following your treatment plan and taking care of yourself can help you feel better and live longer. Follow-up care is a key part of your treatment and safety. Be sure to make and go to all appointments, and call your doctor if you are having problems. It's also a good idea to know your test results and keep a list of the medicines you take. How can you care for yourself at home? Staying healthy  · Do not smoke. This is the most important step you can take to prevent more damage to your lungs. If you need help quitting, talk to your doctor about stop-smoking programs and medicines. These can increase your chances of quitting for good. · Avoid colds and flu. Get a pneumococcal vaccine shot. If you have had one before, ask your doctor whether you need a second dose. Get the flu vaccine every fall. If you must be around people with colds or the flu, wash your hands often. · Avoid secondhand smoke, air pollution, and high altitudes. Also avoid cold, dry air and hot, humid air. Stay at home with your windows closed when air pollution is bad. Medicines and oxygen therapy  · Take your medicines exactly as prescribed. Call your doctor if you think you are having a problem with your medicine. You may be taking medicines such as:  ? Bronchodilators. These help open your airways and make breathing easier. They are either short-acting (work for 6 to 9 hours) or long-acting (work for 24 hours). You inhale most bronchodilators, so they start to act quickly.  Always carry your quick-relief inhaler with you in case you need it while you are away from home. ? Corticosteroids (prednisone, budesonide). These reduce airway inflammation. They come in pill or inhaled form. You must take these medicines every day for them to work well. · Ask your doctor or pharmacist if a spacer is right for you. A spacer may help you get more inhaled medicine to your lungs. If you use one, ask how to use it properly. · Do not take any vitamins, over-the-counter medicine, or herbal products without talking to your doctor first.  · If your doctor prescribed antibiotics, take them as directed. Do not stop taking them just because you feel better. You need to take the full course of antibiotics. · If you use oxygen therapy, use the flow rate your doctor has recommended. Don't change it without talking to your doctor first. Oxygen therapy boosts the amount of oxygen in your blood and helps you breathe easier. Activity  · Get regular exercise. Walking is an easy way to get exercise. Start out slowly, and walk a little more each day. · Pay attention to your breathing. You are exercising too hard if you can't talk while you exercise. · Take short rest breaks when doing household chores and other activities. · Learn breathing methods--such as breathing through pursed lips--to help you become less short of breath. · If your doctor has not set you up with a pulmonary rehabilitation program, ask if rehab is right for you. Rehab includes exercise programs, education about your disease and how to manage it, help with diet and other changes, and emotional support. Diet  · Eat regular, healthy meals. Use bronchodilators about 1 hour before you eat to make it easier to eat. Eat several small meals instead of three large ones. Drink beverages at the end of the meal. Avoid foods that are hard to chew. · Eat foods that contain protein so you don't lose muscle mass.   · Talk with your doctor if you gain too much weight or if you lose weight without trying. Mental health  · Talk to your family, friends, or a therapist about your feelings. Some people feel frightened, angry, hopeless, helpless, and even guilty. Talking openly about bad feelings can help you cope. If these feelings last, talk to your doctor. When should you call for help? [id number] anytime you think you may need emergency care. For example, call if:  · You have severe trouble breathing. Call your doctor now or seek immediate medical care if:  · You have new or worse trouble breathing. · You cough up blood. · You have a fever. Watch closely for changes in your health, and be sure to contact your doctor if:  · You cough more deeply or more often, especially if you notice more mucus or a change in the color of your mucus. · You have new or worse swelling in your legs or belly. · You are not getting better as expected. Where can you learn more? Go to http://etelvinaGetting-inolivier.info/  Enter Z066 in the search box to learn more about \"Chronic Obstructive Pulmonary Disease (COPD): Care Instructions. \"  Current as of: February 24, 2020               Content Version: 12.5  © 8202-9152 [employer]. Care instructions adapted under license by [employer] (which disclaims liability or warranty for this information). If you have questions about a medical condition or this instruction, always ask your healthcare professional. [place] [address] any warranty or liability for your use of this information. COPD Exacerbation Plan: Care Instructions  Your Care Instructions    If you have chronic obstructive pulmonary disease (COPD), your usual shortness of breath could suddenly get worse. You may start coughing more and have more mucus. This flare-up is called a COPD exacerbation (say \"xn-GMZ-gq-BAY-shun\"). A lung infection or air pollution could set off an exacerbation.  Sometimes it can happen after a quick change in temperature or being around chemicals. Work with your doctor to make a plan for dealing with an exacerbation. You can better manage it if you plan ahead. Follow-up care is a key part of your treatment and safety. Be sure to make and go to all appointments, and call your doctor if you are having problems. It's also a good idea to know your test results and keep a list of the medicines you take. How can you care for yourself at home? During an exacerbation  · Do not panic if you start to have one. Quick treatment at home may help you prevent serious breathing problems. If you have a COPD exacerbation plan that you developed with your doctor, follow it. · Take your medicines exactly as your doctor tells you.  ? Use your inhaler as directed by your doctor. If your symptoms do not get better after you use your medicine, have someone take you to the emergency room. Call an ambulance if necessary. ? With inhaled medicines, a spacer or a nebulizer may help you get more medicine to your lungs. Ask your doctor or pharmacist how to use them properly. Practice using the spacer in front of a mirror before you have an exacerbation. This may help you get the medicine into your lungs quickly. ? If your doctor has given you steroid pills, take them as directed. ? Your doctor may have given you a prescription for antibiotics, which you can fill if you need it. ? Talk to your doctor if you have any problems with your medicine. And call your doctor if you have to use your antibiotic or steroid pills. Preventing an exacerbation  · Do not smoke. This is the most important step you can take to prevent more damage to your lungs and prevent problems. If you already smoke, it is never too late to stop. If you need help quitting, talk to your doctor about stop-smoking programs and medicines. These can increase your chances of quitting for good. · Take your daily medicines as prescribed. · Avoid colds and flu.   ? Get a pneumococcal vaccine. ? Get a flu vaccine each year, as soon as it is available. Ask those you live or work with to do the same, so they will not get the flu and infect you. ? Try to stay away from people with colds or the flu. ? Wash your hands often. · Avoid secondhand smoke; air pollution; cold, dry air; hot, humid air; and high altitudes. Stay at home with your windows closed when air pollution is bad. · Learn breathing techniques for COPD, such as breathing through pursed lips. These techniques can help you breathe easier during an exacerbation. When should you call for help? [id number] anytime you think you may need emergency care. For example, call if:  · You have severe trouble breathing. · You have severe chest pain. Call your doctor now or seek immediate medical care if:  · You have new or worse shortness of breath. · You develop new chest pain. · You are coughing more deeply or more often, especially if you notice more mucus or a change in the color of your mucus. · You cough up blood. · You have new or increased swelling in your legs or belly. · You have a fever. Watch closely for changes in your health, and be sure to contact your doctor if:  · You need to use your antibiotic or steroid pills. · Your symptoms are getting worse. Where can you learn more? Go to http://www.gray.com/  Enter U536 in the search box to learn more about \"COPD Exacerbation Plan: Care Instructions. \"  Current as of: February 24, 2020               Content Version: 12.5  © 2006-2020 [employer]. Care instructions adapted under license by [employer] (which disclaims liability or warranty for this information). If you have questions about a medical condition or this instruction, always ask your healthcare professional. [place] [address] any warranty or liability for your use of this information.          Patient Education        Learning About COPD Triggers  What are triggers? When you have COPD (chronic obstructive pulmonary disease), certain things can make your symptoms worse. These are called triggers. They include:  · Cigarette smoke or air pollution. · Illnesses like colds, flu, or pneumonia. · Cleaning supplies or other chemicals. · Gases, particles, or fumes from wood or kerosene home heaters. Not all people have the same triggers. What may cause symptoms in one person may not be a problem for another person. How do triggers affect COPD? Triggers can make it harder for your lungs to work as they should and can lead to sudden difficulty breathing and other symptoms. When you are around a trigger, a COPD flare-up is more likely. If your symptoms are severe, you may need emergency treatment or have to go to the hospital for treatment. If you know what your triggers are and can avoid them, you can reduce how often you have flare-ups and how much COPD affects your life. It's also important to be active and to take your daily medicines as prescribed. This helps prevent flare-ups too. What can you do to avoid triggers? The first thing is to know your triggers. When you are having symptoms, note the things around you that might be causing them. Then look for patterns in what may be triggering your symptoms. When you have your list of possible triggers, work with your doctor to find ways to avoid them. Here are some ways to avoid a few common triggers. · Do not smoke or allow others to smoke around you. If you need help quitting, talk to your doctor about stop-smoking programs and medicines. These can increase your chances of quitting for good. · If there is a lot of pollution, pollen, or dust outside, stay at home and keep your windows closed. Use an air conditioner or air filter in your home. Check your local weather report or newspaper for air quality and pollen reports. · Get a flu vaccine every year.  Talk to your doctor about getting a pneumococcal shot. [employer] your hands often to prevent infections. How can you manage a flare-up? Do not panic if you start to have a COPD flare-up. If you have a COPD action plan, follow the plan. In general:  · Use your quick-relief inhaler as directed by your doctor. If your symptoms do not get better after you use your medicine, have someone take you to the emergency room. Call an ambulance if needed. · Use a spacer with your metered-dose inhaler (MDI). If you have a nebulizer for inhaled medicine, use it. A spacer or nebulizer may help get more medicine to your lungs. · If your doctor has given you other inhaled medicines or steroid pills, take them as directed. · If your doctor has given you a prescription for an antibiotic, fill it if you need to. · Call your doctor if you have to use your antibiotic or steroid pills. Where can you learn more? Go to http://www.gray.com/  Enter W244 in the search box to learn more about \"Learning About COPD Triggers. \"  Current as of: February 24, 2020               Content Version: 12.5  © 5724-7594 [employer]. Care instructions adapted under license by [employer] (which disclaims liability or warranty for this information). If you have questions about a medical condition or this instruction, always ask your healthcare professional. [address] any warranty or liability for your use of this information.

## 2020-08-12 NOTE — PROGRESS NOTES
Important Message from Medicare\" reviewed and explained with the patient via telephone. Patient states she understand her rights and doesn't need a copy of the letter. Original document placed in patient's chart.

## 2020-08-12 NOTE — PROGRESS NOTES
Reason for Admission:   COPD exacerbation ([facility] [address] [J44.1]  Pneumonia due to COVID-19 virus [U07.1, J12.89]               RUR Score:     69             Resources/supports as identified by patient/family:       Top Challenges facing patient (as identified by patient/family and CM): Finances/Medication cost?     No needs identified  Transportation      Family or medicaid  Support system or lack thereof?   family  Living arrangements? Lives with daughter   Self-care/ADLs/Cognition? Alert and oriented        Current Advanced Directive/Advance Care Plan:   no                          Plan for utilizing home health:    yes                      Likelihood of readmission:   HIGH    Transition of Care Plan:                    Initial assessment completed with patient. Cognitive status of patient: oriented to time, place, person and situation. Face sheet information confirmed:  yes. The patient designates daughter to participate in her discharge plan and to receive any needed information. This patient lives in a single family home with patient and daughter. Patient is able to navigate steps as needed. Prior to hospitalization, patient was considered to be independent with ADLs/IADLS : no . If not independent,  patient needs assist with : ADLs    Patient has a current ACP document on file: no  The patient and daughter will be available to transport patient home upon discharge. The patient already has [address], [facility], and Home oxygent and trilogy from [employer] medical equipment available in the home. Patient is currently active with home health. If active, agency name is [facility]. Patient has not stayed in a skilled nursing facility or rehab. Was  stay within last 60 days : no. This patient is on dialysis :no      List of available Home Health agencies were provided and reviewed with the patient prior to discharge. Freedom of choice signed: yes, for [facility].  Currently, the discharge plan is Home with Home Health. The patient states that she can obtain her medications from the pharmacy, and take her medications as directed. Patient's current insurance is m[employer] AND MEDICAID. Care Management Interventions  PCP Verified by CM:  Yes  Mode of Transport at Discharge: Self  Transition of Care Consult (CM Consult): [address]: Yes  Physical Therapy Consult: Yes  Occupational Therapy Consult: Yes  Current Support Network: Lives with Caregiver(Daughter)  The Plan for Transition of Care is Related to the Following Treatment Goals : Home health  The Patient and/or Patient Representative was Provided with a Choice of Provider and Agrees with the Discharge Plan?: Yes  Freedom of Choice List was Provided with Basic Dialogue that Supports the Patient's Individualized Plan of Care/Goals, Treatment Preferences and Shares the Quality Data Associated with the Providers?: Yes  Discharge Location  Discharge Placement: Home with home health        Mary Salvador, [facility] BSN  Care Manager  [phone]

## 2020-08-12 NOTE — PROGRESS NOTES
Discharge order noted for today. [facility] Met with patient  and are agreeable to the transition plan today. Transport has been arranged through [facility]. Patient's discharge summary and home health  orders have been forwarded to  [facility] home health  agency via [facility] . Updated bedside RN, RAHEEM,  to the transition plan.   Discharge information has been documented on the AVS.       Lexi Gee RN BSN  Care Manager  [phone]

## 2020-08-13 ENCOUNTER — PATIENT OUTREACH (OUTPATIENT)
Dept: CASE MANAGEMENT | Age: 60
End: 2020-08-13

## 2020-08-13 VITALS — RESPIRATION RATE: 20 BRPM

## 2020-08-13 PROCEDURE — 3331090001 HH PPS REVENUE CREDIT

## 2020-08-13 PROCEDURE — 3331090002 HH PPS REVENUE DEBIT

## 2020-08-13 NOTE — PROGRESS NOTES
Patient contacted regarding recent discharge and COVID-19 risk. Discussed COVID-19 related testing which was available at this time. Test results were negative. Patient informed of results, if available? Patient states that she has been tested for Coronavirus and recent results were negative. Please see laboratory results as needed for verification. Care Transition Nurse/ Ambulatory Care Manager/ LPN Care Coordinator contacted the patient by telephone to perform post discharge assessment. Verified name and  with patient as identifiers. Patient has following risk factors of: COPD. CTN/ACM/LPN reviewed discharge instructions, medical action plan and red flags related to discharge diagnosis. Reviewed and educated them on any new and changed medications related to discharge diagnosis. Patient reports that she has a home health aide to assist her. Home health aide has picked up patient's mediations. Patient does not have any concerns or questions re: medications at this time. Advance Care Planning:   Does patient have an Advance Directive: not on file per EMR revew at this time. Patient declined education provided regarding infection prevention, and signs and symptoms of COVID-19 and when to seek medical attention. Patient stated that she was not being funny but that she has lived through  [address] [place] [address] [address] and does not need this information. Patient[facility]family/caregiver [facility] and agrees to enroll yes  Patient's preferred e-mail:      [email or web address]       Patient's preferred phone number:   Based on Loop alert triggers, patient will be contacted by nurse care manager for worsening symptoms. Pt will be further monitored by COVID Loop Team based on severity of symptoms and risk factors.

## 2020-08-14 ENCOUNTER — VIRTUAL VISIT (OUTPATIENT)
Dept: PULMONOLOGY | Age: 60
End: 2020-08-14

## 2020-08-14 DIAGNOSIS — G47.33 OSA (OBSTRUCTIVE SLEEP APNEA): ICD-10-CM

## 2020-08-14 DIAGNOSIS — J45.51 POORLY CONTROLLED SEVERE PERSISTENT ASTHMA WITH ACUTE EXACERBATION: ICD-10-CM

## 2020-08-14 DIAGNOSIS — E66.01 MORBID OBESITY (HCC): ICD-10-CM

## 2020-08-14 DIAGNOSIS — J96.22 ACUTE ON CHRONIC RESPIRATORY FAILURE WITH HYPOXIA AND HYPERCAPNIA (HCC): ICD-10-CM

## 2020-08-14 DIAGNOSIS — J96.21 ACUTE ON CHRONIC RESPIRATORY FAILURE WITH HYPOXIA AND HYPERCAPNIA (HCC): ICD-10-CM

## 2020-08-14 DIAGNOSIS — R06.09 DYSPNEA ON EXERTION: ICD-10-CM

## 2020-08-14 DIAGNOSIS — R06.02 SHORTNESS OF BREATH: ICD-10-CM

## 2020-08-14 DIAGNOSIS — Z87.891 PERSONAL HISTORY OF TOBACCO USE, PRESENTING HAZARDS TO HEALTH: ICD-10-CM

## 2020-08-14 DIAGNOSIS — J44.9 ASTHMA-COPD OVERLAP SYNDROME (HCC): Primary | ICD-10-CM

## 2020-08-14 DIAGNOSIS — Z79.51 CURRENT CHRONIC USE OF INHALED STEROID: ICD-10-CM

## 2020-08-14 DIAGNOSIS — E66.2 OBESITY HYPOVENTILATION SYNDROME (HCC): ICD-10-CM

## 2020-08-14 DIAGNOSIS — J44.9 COPD, GROUP D, BY GOLD 2017 CLASSIFICATION (HCC): ICD-10-CM

## 2020-08-14 PROCEDURE — 3331090002 HH PPS REVENUE DEBIT

## 2020-08-14 PROCEDURE — 3331090001 HH PPS REVENUE CREDIT

## 2020-08-14 RX ORDER — PREDNISONE 10 MG/1
TABLET ORAL
Qty: 70 TAB | Refills: 0 | Status: SHIPPED | OUTPATIENT
Start: 2020-08-14 | End: 2020-09-11

## 2020-08-14 NOTE — PROGRESS NOTES
[address] [place], [place] [address]  [phone]    [facility]  Pulmonary, Critical Care, and Sleep Medicine    Pulmonary Hosp D/C F/U  Name: Irving Simmons 60 y.o. female  MRN: [id number]  : 1960  Service Date: 20  Chief Complaint:   Chief Complaint   Patient presents with   [facility] Follow Up       Irving Simmons is a 60 y.o. female who was seen by synchronous (real-time) audio-video technology on 2020. Consent:  She and/or her healthcare decision maker is aware that this patient-initiated Telehealth encounter is a billable service, with coverage as determined by her insurance carrier. She is aware that she may receive a bill and has provided verbal consent to proceed: Yes    I was in the office while conducting this encounter. She was at home      History of Present Illness:  Irving Simmons is a 60 y.o. female, who presents to Pulmonary clinic for hospital discharge follow-up with regards to acute exacerbation of asthma/COPD  Pt was last seen in Pulmonary clinic on 20. In the interval, pt was admitted to [facility] 3 times for respiratory distress. Initially at the beginning of July, patient was admitted for respiratory distress in the setting of COVID-19 pneumonia and sepsis. Patient was hospitalized for multiple days requiring stepdown admission, never intubated. Patient was discharged, and had symptoms of bronchospasm and shortness of breath recur after a few days, so patient came back to the ER. Patient was admitted for multiple days, discharged on steroid taper. Patient reports that symptoms again recurred quickly after discharge, so patient presented back to the hospital.  Patient was seen by pulmonary medicine and recommended a prednisone taper of 40 mg and decrease by 10 mg every week, however primary team discharged the patient on only 5 days of prednisone.   Patient was discharged 2 days ago, remains on prednisone 40 mg. Patient reports she is only using Combivent and duo nebs. Patient was instructed not to use Flovent for unknown reason. Patient continues to use PRN albuterol about 3-4 times per day. She mainly uses it in nebulizer form, has had to use her rescue inhaler as well. Patient reports she was discharged with home physical therapy, however she had to cancel home physical therapy because they were not coming during appointment times, and missing appointments. Patient reports she feels fatigued, but notes that she is starting to get some energy back, reports that she is trying to ambulate around the house. Patient continues to wear her supplemental oxygen. Patient reports dyspnea with mild exertion, ambulating a few feet, associated with shortness of breath and chest tightness, cough, nonproductive, slowly improving. Patient reports symptoms are mildly alleviated with DuoNeb as well as rest.  Symptoms worsened by weather, allergic triggers, exertion, dyspnea with ADLs, mMRC of 4. No other modifying factors. Patient denies fevers, chills, night sweats, nausea, vomiting, diarrhea, hemoptysis, angina.     Past Medical History:   Diagnosis Date    Asthma     Chronic lung disease     COPD     Cystocele, midline     Diabetes mellitus ([facility] [address]     GERD (gastroesophageal reflux disease)     Hidradenitis suppurativa     Hyperlipidemia     Hypertension     SHERRIE on CPAP     CPAP    Stress incontinence      Past Surgical History:   Procedure Laterality Date    BREAST SURGERY PROCEDURE UNLISTED      Right breast benign tumor removal    HX APPENDECTOMY      HX CHOLECYSTECTOMY      HX DILATION AND CURETTAGE      Dysfunctional uterine bleeding, thought 2/2 fibroids    HX TUBAL LIGATION       Family History   Problem Relation Age of Onset    Hypertension Mother     Stroke Mother     Breast Cancer Mother         Bilateral mastectomies    Cancer Mother         ovarian and breast    Heart Failure Mother     Heart Attack Father         2011    Heart Surgery Father         CABG    Heart Failure Father     COPD Sister         Heavy smoker    Cancer Sister         ovarian    Heart Failure Sister     Lung Disease Sister     Asthma Child     Cancer Maternal Aunt         pancreatic    Cancer Maternal Grandfather         stomach     Social History     Socioeconomic History    Marital status: LEGALLY      Spouse name: Not on file    Number of children: Not on file    Years of education: Not on file    Highest education level: Not on file   Occupational History    Not on file   Social Needs    Financial resource strain: Not on file    Food insecurity     Worry: Not on file     Inability: Not on file    Transportation needs     Medical: Not on file     Non-medical: Not on file   Tobacco Use    Smoking status: Former Smoker     Packs/day: 1.00     Years: 30.00     Pack years: 30.00     Types: Cigarettes     Start date: 1966     Last attempt to quit: 2006     Years since quittin.9    Smokeless tobacco: Former User    Tobacco comment: 1-1.5 packs per day   Substance and Sexual Activity    Alcohol use: No    Drug use: No    Sexual activity: Not Currently   Lifestyle    Physical activity     Days per week: Not on file     Minutes per session: Not on file    Stress: Not on file   Relationships    Social connections     Talks on phone: Not on file     Gets together: Not on file     Attends Spiritism service: Not on file     Active member of club or organization: Not on file     Attends meetings of clubs or organizations: Not on file     Relationship status: Not on file    Intimate partner violence     Fear of current or ex partner: Not on file     Emotionally abused: Not on file     Physically abused: Not on file     Forced sexual activity: Not on file   Other Topics Concern    Not on file   Social History Narrative    Not on file       Allergies: I have reviewed the allergy hx  Allergies   Allergen Reactions    Ancef [Cefazolin] Hives    Contrast Agent [Iodine] Anaphylaxis, Shortness of Breath and Swelling     Needs pre-medication for IV contrast with Benadryl, Solu-Medrol    Fish Containing Products Anaphylaxis     Pt states she had a severe allergic reaction at 7 y/o.  Statins-Hmg-Coa Reductase Inhibitors Myalgia    Metformin Other (comments)     Abdominal pain, diarrhea.  Codeine Other (comments)     Altered mental status       Medications:  I have reviewed the patient's medications  Prior to Admission medications    Medication Sig Start Date End Date Taking? Authorizing Provider   levoFLOXacin (LEVAQUIN) 750 mg tablet Take 1 Tab by mouth every twenty-four (24) hours for 3 days. 8/12/20 8/15/20 Yes Mark Anthony Sunshine MD   predniSONE (DELTASONE) 20 mg tablet Take 40 mg by mouth daily for 5 days. 8/12/20 8/17/20 Yes Mark Anthony Sunshine MD   ipratropium-albuteroL (COMBIVENT RESPIMAT)  mcg/actuation inhaler Take 1 Puff by inhalation every four (4) hours as needed for Wheezing. 8/12/20  Yes Mark Anthony Sunshine MD   acetaminophen (TYLENOL) 500 mg tablet Take 500 mg by mouth every six (6) hours as needed for Fever or Pain. Yes Provider, Historical   albuterol (PROVENTIL HFA, VENTOLIN HFA, PROAIR HFA) 90 mcg/actuation inhaler Take 2 Puffs by inhalation every four (4) hours as needed for Wheezing. 7/7/20  Yes Evonne Kate PA-C   azithromycin [facility]) 250 mg tablet Take 1 Tab by mouth every Monday, Wednesday, Friday. Monday-Friday. 7/3/20  Yes Pankaj Alvarenga MD   omeprazole (PRILOSEC) 40 mg capsule Take 40 mg by mouth daily. 3/9/18  Yes Provider, Historical   insulin glargine (Basaglar KwikPen U-100 Insulin) 100 unit/mL (3 mL) inpn 45 Units by SubCUTAneous route nightly. Yes Provider, Historical   furosemide (Lasix) 20 mg tablet Take 20 mg by mouth daily.    Yes Provider, Historical   simethicone (GAS-X) 125 mg capsule Take 125 mg by mouth four (4) times daily as needed for Flatulence. Yes Provider, Historical   loratadine (CLARITIN) 10 mg tablet Take 10 mg by mouth daily as needed for Allergies. Yes Provider, Historical   lisinopril (PRINIVIL, ZESTRIL) 20 mg tablet Take 20 mg by mouth two (2) times a day. Yes Provider, Historical   fluticasone propion-salmeterol (ADVAIR HFA) 230-21 mcg/actuation inhaler Take 2 Puffs by inhalation two (2) times a day. Yes Provider, Historical   hydroCHLOROthiazide (HYDRODIURIL) 12.5 mg tablet Take 12.5 mg by mouth daily. Yes Provider, Historical   tiotropium bromide (SPIRIVA RESPIMAT) 2.5 mcg/actuation inhaler Take 2 Puffs by inhalation daily. Yes Provider, Historical   NOVOLOG FLEXPEN U-100 INSULIN 100 unit/mL inpn Continue home Sliding scale insulin as prior to admission  Patient taking differently: 1 Units by SubCUTAneous route three (3) times daily. If BG <100=0u  101-150=5u  151-250=8u  251-300=12u  >300 call MD   7/10/18  Yes Brett Small MD   OXYGEN-AIR DELIVERY SYSTEMS 2 L by Nasal route continuous. [facility]   Yes Provider, Historical   aspirin delayed-release 81 mg tablet Take 81 mg by mouth daily. Yes Provider, Historical   montelukast (SINGULAIR) 10 mg tablet Take 10 mg by mouth nightly. Yes Other, MD Lauren   benzonatate (TESSALON) 100 mg capsule Take 1 Cap by mouth three (3) times daily as needed for Cough for up to 20 days. Indications: cough 7/27/20 8/16/20  Jeni Garcia, DO   lactobacillus sp. 50 billion cpu (BIO-K PLUS) 50 billion cell -375 mg cap capsule Take 1 Cap by mouth daily.  3/10/20   Mitali Iglesias MD       Immunizations:  I have reviewed the patient's immunizations  Immunization History   Administered Date(s) Administered    Influenza Vaccine 09/21/2009, 10/21/2011, 12/21/2012, 01/07/2014, 10/20/2014, 10/01/2015, 10/07/2016, 12/31/2019    Influenza Vaccine (Quad) PF 01/26/2018, 10/11/2018    Novel Influenza-H1N1-09, All Formulations 01/29/2010    Pneumococcal Conjugate (PCV-13) 12/31/2019    Pneumococcal Polysaccharide (PPSV-23) 10/11/2018       Review of Systems:  A complete review of systems was performed as stated in the HPI, all others are negative. Objective:    Physical Exam:  There were no vitals taken for this visit. This is a virtual visit    Constitutional: [x] Appears well-developed and well-nourished, obese  [x] No apparent distress      [] Abnormal  (Limited exam due to video visit)    Mental status: [x] Alert and awake  [x] Oriented to person/place/time [x] Able to follow commands    [] Abnormal -     Eyes:   EOM    [x]  Normal    [] Abnormal -   Sclera  [x]  Normal    [] Abnormal -          Discharge [x]  None visible   [] Abnormal   (limited exam due to video visit)     HENT: [x] Normocephalic, atraumatic, extra ocular movement appears intact, nasal bridge midline, no nasal drainage, wearing nasal cannula   [] Abnormal  [x] Mouth/Throat: Mucous membranes are moist    External Ears [x] Normal, no drainage or discharge[] Abnormal -  (limited exam due to video visit)     Neck: [x] No visualized mass, trachea midline [] Abnormal -   (limited exam due to video visit)     Pulmonary/Chest: [x] Respiratory effort normal   [x] No visualized signs of difficulty breathing or respiratory distress        [] Abnormal  (limited exam due to video visit)      Musculoskeletal:   [x] Normal movement of arms with no signs of ataxia         [x] Normal range of motion of neck        [] Abnormal  (limited exam due to video visit)     Neurological:        [x] No Facial Asymmetry (Cranial nerve 7 motor function) (limited exam due to video visit)          [x] No gaze palsy        [] Abnormal  (limited exam due to video visit)         Skin:        [x] No significant exanthematous lesions or discoloration noted on facial skin         [] Abnormal   (limited exam due to video visit)            Psychiatric:       [x] Normal Affect, thought content, processing  [] Abnormal  [x] No Hallucinations    Labs:   I have reviewed the patient's available labs  Lab Results   Component Value Date/Time    WBC 9.0 08/12/2020 01:57 AM    HGB 13.0 08/12/2020 01:57 AM    HCT 38.8 08/12/2020 01:57 AM    PLATELET 421 90/26/7952 01:57 AM    MCV 92.8 08/12/2020 01:57 AM     Lab Results   Component Value Date/Time    Sodium 138 08/12/2020 01:57 AM    Potassium 3.5 08/12/2020 01:57 AM    Chloride 98 (L) 08/12/2020 01:57 AM    CO2 35 (H) 08/12/2020 01:57 AM    Anion gap 5 08/12/2020 01:57 AM    Glucose 169 (H) 08/12/2020 01:57 AM    BUN 20 (H) 08/12/2020 01:57 AM    Creatinine 0.88 08/12/2020 01:57 AM    BUN/Creatinine ratio 23 (H) 08/12/2020 01:57 AM    GFR est AA >60 08/12/2020 01:57 AM    GFR est non-AA >60 08/12/2020 01:57 AM    Calcium 9.6 08/12/2020 01:57 AM    Bilirubin, total 0.7 08/07/2020 03:10 PM    Alk. phosphatase 66 08/07/2020 03:10 PM    Protein, total 7.7 08/07/2020 03:10 PM    Albumin 3.8 08/07/2020 03:10 PM    Globulin 3.9 08/07/2020 03:10 PM    A-G Ratio 1.0 08/07/2020 03:10 PM    ALT (SGPT) 52 08/07/2020 03:10 PM    AST (SGOT) 17 08/07/2020 03:10 PM     Imaging:  I have personally reviewed patient's imaging --CT abdomen pelvis from 7/30/2020, I only reviewed lung bases, which show some increased groundglass opacities and streaky atelectasis, likely resolving COVID-19 pneumonia. No masses or effusions seen. Official report per radiology:  CT Results (most recent):  Results from Hospital Encounter encounter on 07/30/20   CT ABD PELV WO CONT    Addendum Addendum: Addendum:  As mentioned in the initial report but not in impression are extensive peripheral reticular and fibrotic opacities in imaged bilateral lower lung  new since the CT in 4/20 and concerning subacute interstitial pneumonitis. Clinical correlation and chest CT evaluation advised.       Stacey Benz MD 7/30/2020 10:38 AM          Narrative CT of abdomen and pelvis without contrast     INDICATION: Increasing pain at the ventral hernia    COMPARISON: CT 4/8/20    TECHNIQUE: 5 mm helical scan to the abdomen and pelvis is obtained  from the  diaphragm to the symphysis pubis without  IV contrast administration. All CT scans at this facility performed using dose optimization techniques as  appreciated to a performed exam, to include automated exposure control,  adjustment of the mA and or KU according to patient size (including appropriate  matching for site specific examination), or use of iterative reconstruction  technique. FINDINGS: The study is suboptimal due to lack of IV contrast.  Subtle  abnormality can be under detected. Lung Bases: Moderate streaky and reticular opacities identified in imaged  bilateral lower lobes, new since the prior study. Atelectasis in right middle  lobe along the fissure. Prominent pericardial fat pad. Liver: Normal.    Gallbladder: Surgically absent. Biliary System: No ductal dilatation. Spleen: Normal.    Pancreas: Normal.    Adrenal Glands: Normal.    Kidneys: Normal.    Bowel: The stomach is filled with food content within no definite abnormality. The small bowel is mildly dilated with air-fluid levels as well as mild mucosal  thickening, especially in the jejunum. The dilatation is extending to the  terminal ileum with no definite transit point. The colon is nondilated with  scattered diverticula throughout. No diverticulitis. The periumbilical hernia is slightly decreased in size from 6.7 x 7.2 cm to 5.8  x 6.3 cm. The small bowel loop within the hernia sac is no longer evident  instead is abutting the hernia. Although, the mesenteric fat within the hernia  sac appears inflamed. Mild hernia wall thickening and minimal fluid appear  unchanged. Lower genitourinary system: The bladder is poorly distended. The uterus is  surgically absent. Peritoneum/Retroperitoneum: No adenopathy. Vasculature: Moderate atherosclerotic aortic disease. Other: No free fluid.     CT OSSEOUS STRUCTURES: Unremarkable for age. Impression IMPRESSION:     1. The ventral hernia is slightly smaller and the small bowel loop is no longer  seen in the hernia sac but abutting the hernia neck compared to the prior CT in  4/20. The mesenteric within the hernia sac becomes strained, suggesting fatty  inflammation/ischemia. 2. Interval development of mild small bowel dilatation with air-fluid level and  mild mural thickening all the way to the terminal ileum with no evidence of  transit point. The finding could represent enteritis or adynamic ileus. Recommend clinical correlation. 3. Diverticulosis coli. Thank you for this referral.        PFTs:  I have reviewed the patient's PFTs -- no new studies    TTE:  I have reviewed the patient's TTE results  05/21/20   ECHO ADULT COMPLETE 05/25/2020 5/25/2020    Narrative · Image quality for this study was technically difficult. Contrast used:   DEFINITY. · Normal cavity size and systolic function (ejection fraction normal). Moderate concentric hypertrophy. Estimated left ventricular ejection   fraction is 65 - 70%. Visually measured ejection fraction. No regional   wall motion abnormality noted. Moderate (grade 2) left ventricular   diastolic dysfunction. · Mildly dilated left atrium. · Pulmonary hypertension. Pulmonary arterial systolic pressure is 61 mmHg. · Severely elevated central venous pressure (15+ mmHg); IVC diameter is   larger than 21 mm and collapses less than 50% with respiration. Signed by: Davida Breaux MD         Assessment and Plan:  60 y.o. female with:    Impression:  1. Acute exacerbation of COPD/asthma overlap syndrome requiring hospitalization:  3rd hospitalization in 2 months, and 8th hospitalization this year  2.   Underlying poorly controlled severe persistent asthma with steroid dependence: Patient has a strong asthma component with significant bronchospasm to a wide variety of classic environmental triggers, was on SCIT therapy in the past.  Despite chronic steroids, patient's absolute eosinophil count is 100 on 6/3/2020 and 200 on 3/24/2020  3. COPD, gold risk category D  4. Chronic hypoxic and hypercapnic respiratory failure  5. Morbid obesity: 44.64 kg/m²  6. Obesity hypoventilation syndrome with SHERRIE: [facility]  7. Dyspnea/shortness of breath: Multifactorial, due to above  8. Chronic rhinitis  9. Chronic steroid dependence: Noted above  10. Previous tobacco use: Quit smoking 13 years ago    Plan:  -Continue prednisone taper as follows: 40 mg for 7 days, decrease by 10 mg every 7 days until patient reaches 10 mg for 7 days, then go to 5 mg chronically. No indication for PCP prophylaxis given that patient will be tapering down below 20 mg threshold. Long-term plan will be to wean off of steroids once benralizumab therapy has been maximized  -Due to recurrent exacerbations in the setting of poorly controlled, severe persistent asthma and patient on maximal medical therapy, I strongly advised patient to consider biologic therapy. Discussed patient's recent lab work showing significant eosinophilia in the setting of chronic steroid dependence, I discussed the possibility of benralizumab subcu every other month. I reviewed the risks and benefits including the risks of anaphylaxis/delayed anaphylaxis, injection site reaction, as well as side effects of headache and pharyngitis. Patient was agreeable to initiating therapy if approved by her insurance.  -Start benralizumab 30 mg subcu every 4 weeks for the first 3 doses followed by every 8 weeks thereafter  -With regards to COPD, continue chronic azithromycin therapy, adjust regimen to azithromycin 250 mg p.o. every Monday Wednesday Friday  -With regards to inhalers, restart dual ICS/LABA/LAMA/LTRA therapy, will maximize therapy as follows:   --Discontinue Combivent.   Advised patient she may use as a rescue, PRN   --Restart Advair /21 MCG 2 puffs twice daily. Counseled patient rinse mouth thoroughly after use   --Restart Flovent  mcg 1 puff twice daily. Counseled patient to rinse mouth well after each use   --Restart Spiriva Respimat 2 puffs daily   --Continue Singulair 10 mg nightly   --Continue DuoNebs as needed   --Continue Albuterol HFA 1-2puffs q4-6h PRN. Counseled patient that this is their rescue inhaler. Also counseled patient regarding premedication 15-30m prior to exercise or exposure to very cold air  -Counseled patient on proper inhaler technique  -Counseled patient to avoid potential triggers of asthma  -Since patient is unable to receive home PT, advised pulmonary rehab, orders placed  -Counseled regarding weight loss  -Counseled regarding deleterious health effects of chronic steroid therapy. Counseled patient to follow-up with ophthalmology as scheduled. Follow-up with DEXA scan ordered, nonurgent  -Continue Claritin daily  -Continue NIPPV/trilogy (AVAPS-AE) nightly and PRN during daytime. Counseled patient to change mask/tubing/filters every 3 to 6 months. Counseled patient against sleepy driving.  -Continue supplemental oxygen 2 L by nasal cannula with exertion and blended into AVAPS nightly  -CT lung cancer screening as previously ordered (quit smoking 14 years ago), I advised patient that we will perform lung cancer screening 15 years after she has quit smoking (should end next year). Shared decision-making performed  -Immunizations reviewed, influenza and pneumococcal vaccinations up-to-date    Follow-up and Dispositions    · Return in about 2 months (around 10/14/2020).          Orders Placed This Encounter    AMB POC PFT COMPLETE W/BRONCHODILATOR    AMB POC PFT COMPLETE W/O BRONCHODILATOR    GAS DILUT/WASHOUT LUNG VOL W/WO DISTRIB VENT&VOL    DIFFUSING CAPACITY    REFERRAL TO PULMONARY REHAB    predniSONE (DELTASONE) 10 mg tablet    fluticasone propion-salmeteroL (ADVAIR HFA) 230-21 mcg/actuation inhaler     We discussed the expected course, resolution and complications of the diagnosis(es) in detail. Medication risks, benefits, costs, interactions, and alternatives were discussed as indicated. I advised her to contact the office if her condition worsens, changes or fails to improve as anticipated. She expressed understanding with the diagnosis(es) and plan. Pursuant to the emergency declaration under the [address] [place] [address] waiver authority and the [employer] and [employer] Act, this Virtual  Visit was conducted, with patient's consent, to reduce the patient's risk of exposure to COVID-19 and provide continuity of care for an established patient. Services were provided through a video synchronous discussion virtually to substitute for in-person clinic visit.     Ramses Wetzel MD/MPH     Pulmonary, Critical Care Medicine  [facility]

## 2020-08-15 ENCOUNTER — HOME CARE VISIT (OUTPATIENT)
Dept: SCHEDULING | Facility: HOME HEALTH | Age: 60
End: 2020-08-15
Payer: MEDICARE

## 2020-08-15 PROCEDURE — 3331090001 HH PPS REVENUE CREDIT

## 2020-08-15 PROCEDURE — G0299 HHS/HOSPICE OF RN EA 15 MIN: HCPCS

## 2020-08-15 PROCEDURE — 3331090002 HH PPS REVENUE DEBIT

## 2020-08-16 VITALS
DIASTOLIC BLOOD PRESSURE: 76 MMHG | HEART RATE: 94 BPM | TEMPERATURE: 97.6 F | SYSTOLIC BLOOD PRESSURE: 132 MMHG | OXYGEN SATURATION: 97 % | RESPIRATION RATE: 20 BRPM

## 2020-08-16 PROCEDURE — 3331090002 HH PPS REVENUE DEBIT

## 2020-08-16 PROCEDURE — 3331090001 HH PPS REVENUE CREDIT

## 2020-08-17 ENCOUNTER — HOME CARE VISIT (OUTPATIENT)
Dept: HOME HEALTH SERVICES | Facility: HOME HEALTH | Age: 60
End: 2020-08-17
Payer: MEDICARE

## 2020-08-17 PROCEDURE — 3331090002 HH PPS REVENUE DEBIT

## 2020-08-17 PROCEDURE — 3331090001 HH PPS REVENUE CREDIT

## 2020-08-18 ENCOUNTER — OFFICE VISIT (OUTPATIENT)
Dept: SURGERY | Age: 60
End: 2020-08-18

## 2020-08-18 VITALS
HEIGHT: 63 IN | SYSTOLIC BLOOD PRESSURE: 153 MMHG | BODY MASS INDEX: 45.89 KG/M2 | WEIGHT: 259 LBS | HEART RATE: 94 BPM | OXYGEN SATURATION: 97 % | RESPIRATION RATE: 20 BRPM | DIASTOLIC BLOOD PRESSURE: 69 MMHG | TEMPERATURE: 97.3 F

## 2020-08-18 DIAGNOSIS — K42.9 UMBILICAL HERNIA WITHOUT OBSTRUCTION AND WITHOUT GANGRENE: Primary | ICD-10-CM

## 2020-08-18 PROCEDURE — 3331090001 HH PPS REVENUE CREDIT

## 2020-08-18 PROCEDURE — 3331090002 HH PPS REVENUE DEBIT

## 2020-08-18 NOTE — PROGRESS NOTES
Sarwat Biggs is a 60 y.o. female  Chief Complaint   Patient presents with    Follow-up     ventral hernia         Is someone accompanying this pt? no    Is the patient using any DME equipment during OV? Portable oxygen machine        Vitals:    08/18/20 1409   BP: 153/69   Pulse: 94   Resp: 20   Temp: 97.3 °F (36.3 °C)   SpO2: 97%   Weight: 259 lb (117.5 kg)   Height: 5' 3\" (1.6 m)        Depression Screening:  3 most recent PHQ Screens 2/3/2020   Little interest or pleasure in doing things Not at all   Feeling down, depressed, irritable, or hopeless Not at all   Total Score PHQ 2 0       Learning Assessment:  Learning Assessment 4/9/2018   PRIMARY LEARNER Patient   HIGHEST LEVEL OF EDUCATION - PRIMARY LEARNER  SOME COLLEGE   BARRIERS PRIMARY LEARNER -   PRIMARY LANGUAGE ENGLISH   LEARNER PREFERENCE PRIMARY READING   ANSWERED BY patient Josef Coats   RELATIONSHIP SELF         Fall Risk  Fall Risk Assessment, last 12 mths 2/6/2018   Able to walk? Yes   Fall in past 12 months? No       Health Maintenance reviewed and discussed and ordered per Provider. Coordination of Care:  1. Have you been to the ER, urgent care clinic since your last visit? Hospitalized since your last visit? Yes, 8/13/20 and 7/7/20    2. Have you seen or consulted any other health care providers outside of the [address] [place] [address] since your last visit? Include any pap smears or colon screening.  no

## 2020-08-19 ENCOUNTER — HOME CARE VISIT (OUTPATIENT)
Dept: SCHEDULING | Facility: HOME HEALTH | Age: 60
End: 2020-08-19
Payer: MEDICARE

## 2020-08-19 PROCEDURE — 3331090002 HH PPS REVENUE DEBIT

## 2020-08-19 PROCEDURE — 3331090001 HH PPS REVENUE CREDIT

## 2020-08-19 PROCEDURE — G0299 HHS/HOSPICE OF RN EA 15 MIN: HCPCS

## 2020-08-19 NOTE — PATIENT INSTRUCTIONS
Low-Fat Diet for Gallbladder Disease: After Your Visit  Your Care Instructions  When you eat, the gallbladder releases bile, which helps you digest the fat in food. If you have an inflamed gallbladder, this may cause pain. A low-fat diet may give your gallbladder a rest so you can start to heal. Your doctor and dietitian can help you make an eating plan that does not irritate your digestive system. Always talk with your doctor or dietitian before you make changes in your diet. Follow-up care is a key part of your treatment and safety. Be sure to make and go to all appointments, and call your doctor if you are having problems. Its also a good idea to know your test results and keep a list of the medicines you take. How can you care for yourself at home? · Eat many small meals and snacks each day instead of three large meals. · Choose lean meats. ¨ Eat no more than 5 to 6½ ounces of meat a day. ¨ Cut off all fat you can see. ¨ Eat chicken and turkey without the skin. ¨ Many types of fish, such as salmon, lake trout, tuna, and herring, provide healthy omega-3 fat. But, avoid fish canned in oil, such as sardines in olive oil. ¨ Bake, broil, or grill meats, fowl, or fish instead of frying them in butter or fat. · Drink or eat nonfat or low-fat milk, yogurt, cheese, or other milk products each day. ¨ Read the labels on cheeses, and choose those with less than 5 grams of fat an ounce. ¨ Try fat-free sour cream, cream cheese, or yogurt. ¨ Avoid cream soups and cream sauces on pasta. ¨ Eat low-fat ice cream, frozen yogurt, or sorbet. Avoid regular ice cream.  · Eat whole-grain cereals, breads, crackers, rice, or pasta. Avoid high-fat foods such as croissants, scones, biscuits, waffles, doughnuts, muffins, granola, and high-fat breads. · Flavor your foods with herbs and spices (such as basil, tarragon, or mint), fat-free sauces, or lemon juice instead of butter.  You can also use butter substitutes, fat-free mayonnaise, or fat-free dressing. · Try applesauce, prune puree, or mashed bananas to replace some or all of the fat when you bake. · Limit fats and oils, such as butter, margarine, mayonnaise, and salad dressing, to no more than 1 tablespoon a meal.  · Avoid high-fat foods, such as:  ¨ Chocolate, whole milk, ice cream, processed cheese, and egg yolks. ¨ Fried or buttered foods. ¨ Ham, salami, and cueva. ¨ Cinnamon rolls, cakes, pies, cookies, and other pastries. ¨ Prepared snack foods, such as potato chips, nut and granola bars, and mixed nuts. ¨ Coconut and avocado. · Learn how to read food labels for serving sizes and ingredients. Fast-food and convenience-food meals often have lots of fat. Where can you learn more? Go to [employer]  Enter S495 in the search box to learn more about \"Low-Fat Diet for Gallbladder Disease: After Your Visit. \"   © 7025-3722 Healthwise, Incorporated. Care instructions adapted under license by [employer] (which disclaims liability or warranty for this information). This care instruction is for use with your licensed healthcare professional. If you have questions about a medical condition or this instruction, always ask your healthcare professional. [place] [address] any warranty or liability for your use of this information. Content Version: 5.4.402566; Last Revised: August 31, 2011       High-Fiber Diet: Care Instructions  Your Care Instructions     A high-fiber diet may help you relieve constipation and feel less bloated. Your doctor and dietitian will help you make a high-fiber eating plan based on your personal needs. The plan will include the things you like to eat. It will also make sure that you get 30 grams of fiber a day. Before you make changes to the way you eat, be sure to talk with your doctor or dietitian. Follow-up care is a key part of your treatment and safety.  Be sure to make and go to all appointments, and call your doctor if you are having problems. It's also a good idea to know your test results and keep a list of the medicines you take. How can you care for yourself at home? · You can increase how much fiber you get if you eat more of certain foods. These foods include:  ? Whole-grain breads and cereals. ? Fruits, such as pears, apples, and peaches. Eat the skins, peels, and seeds, if you can.  ? Vegetables, such as broccoli, cabbage, spinach, carrots, asparagus, and squash. ? Starchy vegetables. These include potatoes with skins, kidney beans, and lima beans. · Take a fiber supplement every day if your doctor recommends it. Examples are Benefiber, Citrucel, FiberCon, and Metamucil. Ask your doctor how much to take. · Drink plenty of fluids, enough so that your urine is light yellow or clear like water. If you have kidney, heart, or liver disease and have to limit fluids, talk with your doctor before you increase the amount of fluids you drink. · Get some exercise every day. Exercise helps stool move through the colon. It also helps prevent constipation. · Keep a food diary. Try to notice and write down what foods cause gas, pain, or other symptoms. Then you can avoid these foods. Where can you learn more? Go to http://etelvina-olivier.info/  Enter B947 in the search box to learn more about \"High-Fiber Diet: Care Instructions. \"  Current as of: August 22, 2019               Content Version: 12.5  © 4082-8788 Healthwise, Incorporated. Care instructions adapted under license by [employer] (which disclaims liability or warranty for this information). If you have questions about a medical condition or this instruction, always ask your healthcare professional. [address] any warranty or liability for your use of this information.

## 2020-08-19 NOTE — PROGRESS NOTES
[facility]  General Surgery    Name: Mekhi Song MRN: [id number]   : 1960 [facility]   Date: 2020 Admission Date: No admission date for patient encounter. Subjective:  Patient returns today after being hospitalized twice for COVID-19. She is now on 3 L of oxygen 24 hours a day. She uses BiPAP at home as well. The hernia  Is uncomfortable but bearable. She denies any nausea vomiting. We discussed keeping a food journal to help her lose weight. She is on steroids which causes hunger and weight gain. Objective:  Vitals:    20 1409   BP: 153/69   Pulse: 94   Resp: 20   Temp: 97.3 °F (36.3 °C)   SpO2: 97%   Weight: 117.5 kg (259 lb)   Height: 5' 3\" (1.6 m)       Physical Exam:    General: Awake and alert, oriented x4, no apparent distress   Abdomen: abdomen is soft, morbidly obese, with umbilical tenderness. Reducible umbilical hernia without surrounding cellulitis fluctuance or crepitance. No masses, organomegaly or guarding    Current Medications:  Current Outpatient Medications   Medication Sig Dispense Refill    predniSONE (DELTASONE) 10 mg tablet Take 40 mg by mouth daily for 7 days, THEN 30 mg daily for 7 days, THEN 20 mg daily for 7 days, THEN 10 mg daily for 7 days. 70 Tab 0    fluticasone propion-salmeteroL (ADVAIR HFA) 230-21 mcg/actuation inhaler Take 2 Puffs by inhalation two (2) times a day. 3 Each 3    ipratropium-albuteroL (COMBIVENT RESPIMAT)  mcg/actuation inhaler Take 1 Puff by inhalation every four (4) hours as needed for Wheezing. 1 Inhaler 0    acetaminophen (TYLENOL) 500 mg tablet Take 500 mg by mouth every six (6) hours as needed for Fever or Pain.  albuterol (PROVENTIL HFA, VENTOLIN HFA, PROAIR HFA) 90 mcg/actuation inhaler Take 2 Puffs by inhalation every four (4) hours as needed for Wheezing.  1 Inhaler 0    azithromycin (ZITHROMAX) 250 mg tablet Take 1 Tab by mouth every Monday, Wednesday, Friday. Monday-Friday. 30 Tab 0    omeprazole (PRILOSEC) 40 mg capsule Take 40 mg by mouth daily.  insulin glargine (Basaglar KwikPen U-100 Insulin) 100 unit/mL (3 mL) inpn 45 Units by SubCUTAneous route nightly.  furosemide (Lasix) 20 mg tablet Take 20 mg by mouth daily.  lactobacillus sp. 50 billion cpu (BIO-K PLUS) 50 billion cell -375 mg cap capsule Take 1 Cap by mouth daily. 30 Cap 0    simethicone (GAS-X) 125 mg capsule Take 125 mg by mouth four (4) times daily as needed for Flatulence.  loratadine (CLARITIN) 10 mg tablet Take 10 mg by mouth daily as needed for Allergies.  lisinopril (PRINIVIL, ZESTRIL) 20 mg tablet Take 20 mg by mouth two (2) times a day.  tiotropium bromide (SPIRIVA RESPIMAT) 2.5 mcg/actuation inhaler Take 2 Puffs by inhalation daily.  NOVOLOG FLEXPEN U-100 INSULIN 100 unit/mL inpn Continue home Sliding scale insulin as prior to admission (Patient taking differently: 1 Units by SubCUTAneous route three (3) times daily. If BG <100=0u  101-150=5u  151-250=8u  251-300=12u  >300 call MD  ) 1 Pen 0    OXYGEN-AIR DELIVERY SYSTEMS 2 L by Nasal route continuous. First Choice      aspirin delayed-release 81 mg tablet Take 81 mg by mouth daily.  montelukast (SINGULAIR) 10 mg tablet Take 10 mg by mouth nightly.  hydroCHLOROthiazide (HYDRODIURIL) 12.5 mg tablet Take 12.5 mg by mouth daily. Chart and notes reviewed. Data reviewed. I have evaluated and examined the patient. IMPRESSION:   · Patient with reducible umbilical hernia which is symptomatic. PLAN:/DISCUSION:   · Unfortunately the patient is morbidly obese and a very poor candidate for elective hernia repair. She understands that she needs to keep a food journal in order to help decrease her caloric intake and also increase her activity to lose weight to be a better candidate for any elective surgery.   · Follow-up PRN        Iain Tobar MD

## 2020-08-20 PROCEDURE — 3331090001 HH PPS REVENUE CREDIT

## 2020-08-20 PROCEDURE — 3331090002 HH PPS REVENUE DEBIT

## 2020-08-20 NOTE — PROGRESS NOTES
Physician Progress Note      PATIENT:               Arcadio Soriano  CSN #:                  [id number]  :                       1960  ADMIT DATE:       2020 2:46 PM  [address] DATE:        2020 2:12 PM  RESPONDING  PROVIDER #:        Chana Larson MD          QUERY TEXT:    Patient admitted with COPD exacerbation. .Noted documentation of acute respiratory failure in Pulmonary consultation note on . and subsequent pulmonary notes. Please indicate one of the following and document in the medical record: The medical record reflects the following:  Risk Factors: COPD  and SHERRIE  on  3L  NC at home  Clinical Indicators: sat on admit 92%   RA   RR   14 on admit  ER provider notes- Pulmonary:  Effort: Pulmonary effort is normal. No respiratory distress  pulm consult  shows 02 increased to 4L- not supported on flowsheets  ABG - PH  7.39;   PC02   38.6;  P02  89; sat   97%  3L  Treatment: 02  flow rate  remained between 2-3L   except for patient request for BIPAP at night for her SHERRIE    Acute Respiratory Failure Clinical Indicators per 3[facility] MS-DRG Training Guide and Quick Reference Guide:  pO2 < 60 mmHg or SpO2 (pulse oximetry) < 91% breathing room air  pCO2 > 50 and pH < 7.35  P/F ratio (pO2 / FIO2) < 300  pO2 decrease or pCO2 increase by 10 mmHg from baseline (if known)  Supplemental oxygen of 40% or more  Presence of respiratory distress, tachypnea, dyspnea, shortness of breath, wheezing  Unable to speak in complete sentences  Use of accessory muscles to breathe  Extreme anxiety and feeling of impending doom  Tripod position  Confusion/altered mental status/obtunded  query  Thank you,   Karen Ba RN   CCDS  [phone]  Options provided:  -- Acute Respiratory Failure currently as evidenced by, Please document evidence.   -- Currently resolved Acute Respiratory Failure was evidenced by, Please document evidence  -- Acute Respiratory Failure ruled out after study  -- Other - I will add my own diagnosis  -- Disagree - Not applicable / Not valid  -- Disagree - Clinically unable to determine / Unknown  -- Refer to Clinical Documentation Reviewer    PROVIDER RESPONSE TEXT:    This patient is in acute respiratory failure as evidenced by Worsening dyspnea to the point of patient feeling as though she was drowning needing increasing supplemental oxygen to 4 L-not matching on flowsheet as patient was in the emergency room. On arrival she was in respiratory distress.     Query created by: Shakir Pereira on 8/19/2020 10:04 AM      Electronically signed by:  Ana Paula Melendez MD 8/20/2020 1:22 PM

## 2020-08-21 VITALS
RESPIRATION RATE: 20 BRPM | SYSTOLIC BLOOD PRESSURE: 146 MMHG | OXYGEN SATURATION: 96 % | DIASTOLIC BLOOD PRESSURE: 80 MMHG | TEMPERATURE: 97.6 F | HEART RATE: 78 BPM

## 2020-08-21 PROBLEM — J45.51 SEVERE PERSISTENT ASTHMA WITH EXACERBATION: Status: ACTIVE | Noted: 2020-08-21

## 2020-08-21 PROCEDURE — 3331090001 HH PPS REVENUE CREDIT

## 2020-08-21 PROCEDURE — 3331090002 HH PPS REVENUE DEBIT

## 2020-08-22 ENCOUNTER — HOME CARE VISIT (OUTPATIENT)
Dept: HOME HEALTH SERVICES | Facility: HOME HEALTH | Age: 60
End: 2020-08-22
Payer: MEDICARE

## 2020-08-22 PROCEDURE — 3331090002 HH PPS REVENUE DEBIT

## 2020-08-22 PROCEDURE — 3331090001 HH PPS REVENUE CREDIT

## 2020-08-23 PROCEDURE — 3331090002 HH PPS REVENUE DEBIT

## 2020-08-23 PROCEDURE — 3331090001 HH PPS REVENUE CREDIT

## 2020-08-24 PROCEDURE — 3331090001 HH PPS REVENUE CREDIT

## 2020-08-24 PROCEDURE — 3331090002 HH PPS REVENUE DEBIT

## 2020-08-25 PROCEDURE — 3331090002 HH PPS REVENUE DEBIT

## 2020-08-25 PROCEDURE — 3331090001 HH PPS REVENUE CREDIT

## 2020-08-26 PROCEDURE — 3331090002 HH PPS REVENUE DEBIT

## 2020-08-26 PROCEDURE — 3331090001 HH PPS REVENUE CREDIT

## 2020-08-27 ENCOUNTER — HOME CARE VISIT (OUTPATIENT)
Dept: SCHEDULING | Facility: HOME HEALTH | Age: 60
End: 2020-08-27
Payer: MEDICARE

## 2020-08-27 ENCOUNTER — TELEPHONE (OUTPATIENT)
Dept: PULMONOLOGY | Age: 60
End: 2020-08-27

## 2020-08-27 VITALS
OXYGEN SATURATION: 96 % | SYSTOLIC BLOOD PRESSURE: 150 MMHG | DIASTOLIC BLOOD PRESSURE: 90 MMHG | HEART RATE: 95 BPM | TEMPERATURE: 97.8 F

## 2020-08-27 PROCEDURE — 3331090002 HH PPS REVENUE DEBIT

## 2020-08-27 PROCEDURE — G0300 HHS/HOSPICE OF LPN EA 15 MIN: HCPCS

## 2020-08-27 PROCEDURE — 3331090001 HH PPS REVENUE CREDIT

## 2020-08-27 NOTE — TELEPHONE ENCOUNTER
Pulmonary Rehab called patient and left a message on her cell. Additional attempts at contact will be made.     Thank you,  Charley Shah

## 2020-08-27 NOTE — TELEPHONE ENCOUNTER
Pt is supposed to have her first fasenra infusion tomorrow 8/28/2020 but she has not been feeling well. She is having extra shortness of breath and is retaining fluid in her legs. She would like to speak with a nurse to see if she should still go.  Please call [phone]

## 2020-08-28 ENCOUNTER — HOSPITAL ENCOUNTER (OUTPATIENT)
Dept: INFUSION THERAPY | Age: 60
End: 2020-08-28

## 2020-08-28 DIAGNOSIS — J45.51 SEVERE PERSISTENT ASTHMA WITH EXACERBATION: ICD-10-CM

## 2020-08-28 PROCEDURE — 3331090002 HH PPS REVENUE DEBIT

## 2020-08-28 PROCEDURE — 3331090001 HH PPS REVENUE CREDIT

## 2020-08-28 NOTE — TELEPHONE ENCOUNTER
Spoke with patient. She is not feeling well and has a cough and feels like she is retaining fluid. She is at her PCP office now for an appointment. She is going to call the infusion center to postpone starting the fasenra due to being ill.

## 2020-08-29 PROCEDURE — 3331090002 HH PPS REVENUE DEBIT

## 2020-08-29 PROCEDURE — 3331090001 HH PPS REVENUE CREDIT

## 2020-08-30 PROCEDURE — 3331090001 HH PPS REVENUE CREDIT

## 2020-08-30 PROCEDURE — 3331090002 HH PPS REVENUE DEBIT

## 2020-08-31 ENCOUNTER — APPOINTMENT (OUTPATIENT)
Dept: CT IMAGING | Age: 60
End: 2020-08-31
Attending: PHYSICIAN ASSISTANT
Payer: MEDICARE

## 2020-08-31 ENCOUNTER — HOSPITAL ENCOUNTER (EMERGENCY)
Age: 60
Discharge: HOME | End: 2020-08-31
Attending: EMERGENCY MEDICINE
Payer: MEDICARE

## 2020-08-31 VITALS
TEMPERATURE: 97.8 F | OXYGEN SATURATION: 97 % | HEIGHT: 63 IN | SYSTOLIC BLOOD PRESSURE: 142 MMHG | DIASTOLIC BLOOD PRESSURE: 78 MMHG | WEIGHT: 264 LBS | RESPIRATION RATE: 17 BRPM | BODY MASS INDEX: 46.78 KG/M2 | HEART RATE: 95 BPM

## 2020-08-31 DIAGNOSIS — K42.9 UMBILICAL HERNIA WITHOUT OBSTRUCTION AND WITHOUT GANGRENE: Primary | ICD-10-CM

## 2020-08-31 DIAGNOSIS — R73.9 HYPERGLYCEMIA: ICD-10-CM

## 2020-08-31 LAB
ALBUMIN SERPL-MCNC: 3.4 G/DL (ref 3.4–5)
ALBUMIN/GLOB SERPL: 0.8 {RATIO} (ref 0.8–1.7)
ALP SERPL-CCNC: 86 U/L (ref 45–117)
ALT SERPL-CCNC: 59 U/L (ref 13–56)
ANION GAP SERPL CALC-SCNC: 3 MMOL/L (ref 3–18)
APPEARANCE UR: CLEAR
AST SERPL-CCNC: 20 U/L (ref 10–38)
BASOPHILS # BLD: 0 K/UL (ref 0–0.1)
BASOPHILS NFR BLD: 0 % (ref 0–2)
BILIRUB SERPL-MCNC: 0.7 MG/DL (ref 0.2–1)
BILIRUB UR QL: NEGATIVE
BUN SERPL-MCNC: 13 MG/DL (ref 7–18)
BUN/CREAT SERPL: 13 (ref 12–20)
CALCIUM SERPL-MCNC: 9.6 MG/DL (ref 8.5–10.1)
CHLORIDE SERPL-SCNC: 103 MMOL/L (ref 100–111)
CO2 SERPL-SCNC: 31 MMOL/L (ref 21–32)
COLOR UR: YELLOW
CREAT SERPL-MCNC: 0.98 MG/DL (ref 0.6–1.3)
DIFFERENTIAL METHOD BLD: ABNORMAL
EOSINOPHIL # BLD: 0 K/UL (ref 0–0.4)
EOSINOPHIL NFR BLD: 0 % (ref 0–5)
ERYTHROCYTE [DISTWIDTH] IN BLOOD BY AUTOMATED COUNT: 15.2 % (ref 11.6–14.5)
GLOBULIN SER CALC-MCNC: 4.3 G/DL (ref 2–4)
GLUCOSE SERPL-MCNC: 261 MG/DL (ref 74–99)
GLUCOSE UR STRIP.AUTO-MCNC: >1000 MG/DL
HCT VFR BLD AUTO: 38.6 % (ref 35–45)
HGB BLD-MCNC: 12.7 G/DL (ref 12–16)
HGB UR QL STRIP: NEGATIVE
KETONES UR QL STRIP.AUTO: ABNORMAL MG/DL
LEUKOCYTE ESTERASE UR QL STRIP.AUTO: NEGATIVE
LIPASE SERPL-CCNC: 65 U/L (ref 73–393)
LYMPHOCYTES # BLD: 0.8 K/UL (ref 0.9–3.6)
LYMPHOCYTES NFR BLD: 9 % (ref 21–52)
MAGNESIUM SERPL-MCNC: 1.7 MG/DL (ref 1.6–2.6)
MCH RBC QN AUTO: 30.5 PG (ref 24–34)
MCHC RBC AUTO-ENTMCNC: 32.9 G/DL (ref 31–37)
MCV RBC AUTO: 92.8 FL (ref 74–97)
MONOCYTES # BLD: 0.5 K/UL (ref 0.05–1.2)
MONOCYTES NFR BLD: 6 % (ref 3–10)
NEUTS SEG # BLD: 6.8 K/UL (ref 1.8–8)
NEUTS SEG NFR BLD: 85 % (ref 40–73)
NITRITE UR QL STRIP.AUTO: NEGATIVE
PH UR STRIP: 5 [PH] (ref 5–8)
PLATELET # BLD AUTO: 294 K/UL (ref 135–420)
PMV BLD AUTO: 8.9 FL (ref 9.2–11.8)
POTASSIUM SERPL-SCNC: 4.5 MMOL/L (ref 3.5–5.5)
PROT SERPL-MCNC: 7.7 G/DL (ref 6.4–8.2)
PROT UR STRIP-MCNC: NEGATIVE MG/DL
RBC # BLD AUTO: 4.16 M/UL (ref 4.2–5.3)
SODIUM SERPL-SCNC: 137 MMOL/L (ref 136–145)
SP GR UR REFRACTOMETRY: >1.03 (ref 1–1.03)
UROBILINOGEN UR QL STRIP.AUTO: 1 EU/DL (ref 0.2–1)
WBC # BLD AUTO: 8.1 K/UL (ref 4.6–13.2)

## 2020-08-31 PROCEDURE — 80053 COMPREHEN METABOLIC PANEL: CPT

## 2020-08-31 PROCEDURE — 83690 ASSAY OF LIPASE: CPT

## 2020-08-31 PROCEDURE — 81003 URINALYSIS AUTO W/O SCOPE: CPT

## 2020-08-31 PROCEDURE — 3331090001 HH PPS REVENUE CREDIT

## 2020-08-31 PROCEDURE — 83735 ASSAY OF MAGNESIUM: CPT

## 2020-08-31 PROCEDURE — 3331090002 HH PPS REVENUE DEBIT

## 2020-08-31 PROCEDURE — 74176 CT ABD & PELVIS W/O CONTRAST: CPT

## 2020-08-31 PROCEDURE — 85025 COMPLETE CBC W/AUTO DIFF WBC: CPT

## 2020-08-31 PROCEDURE — 99283 EMERGENCY DEPT VISIT LOW MDM: CPT

## 2020-08-31 PROCEDURE — 74011250637 HC RX REV CODE- 250/637: Performed by: PHYSICIAN ASSISTANT

## 2020-08-31 RX ORDER — OXYCODONE AND ACETAMINOPHEN 5; 325 MG/1; MG/1
1 TABLET ORAL
Status: COMPLETED | OUTPATIENT
Start: 2020-08-31 | End: 2020-08-31

## 2020-08-31 RX ADMIN — OXYCODONE HYDROCHLORIDE AND ACETAMINOPHEN 1 TABLET: 5; 325 TABLET ORAL at 17:14

## 2020-08-31 NOTE — ED PROVIDER NOTES
EMERGENCY DEPARTMENT HISTORY AND PHYSICAL EXAM      Date: 8/31/2020  Patient Name: Arnulfo Ruth    History of Presenting Illness     Chief Complaint   Patient presents with    Abdominal Pain       History Provided By: Patient    HPI: Arnulfo Ruth, 63 y.o. female PMHx significant for hyperension, COPD, DM, hyperlipidemia, asthma, SHERRIE presents ambulatory to the ED with cc of constant aching umbilical pain x5 hours. Patient reports history of umbilical hernia. Pt reports she has not been able to reduce hernia. Patient reports previous similar symptoms but was able to reduce when she came to the ED. Patient follows up with Dr. Iliana Manzo for general surgery who states due to past medical history he would not perform surgery since it is elective at this time. Patient has not taken anything for symptoms. Denies vomiting, diarrhea, fever/chills    There are no other complaints, changes, or physical findings at this time. PCP: Rafy Pastor MD    No current facility-administered medications on file prior to encounter. Current Outpatient Medications on File Prior to Encounter   Medication Sig Dispense Refill    predniSONE (DELTASONE) 10 mg tablet Take 40 mg by mouth daily for 7 days, THEN 30 mg daily for 7 days, THEN 20 mg daily for 7 days, THEN 10 mg daily for 7 days. 70 Tab 0    fluticasone propion-salmeteroL (ADVAIR HFA) 230-21 mcg/actuation inhaler Take 2 Puffs by inhalation two (2) times a day. 3 Each 3    ipratropium-albuteroL (COMBIVENT RESPIMAT)  mcg/actuation inhaler Take 1 Puff by inhalation every four (4) hours as needed for Wheezing. 1 Inhaler 0    acetaminophen (TYLENOL) 500 mg tablet Take 500 mg by mouth every six (6) hours as needed for Fever or Pain.  albuterol (PROVENTIL HFA, VENTOLIN HFA, PROAIR HFA) 90 mcg/actuation inhaler Take 2 Puffs by inhalation every four (4) hours as needed for Wheezing.  1 Inhaler 0    azithromycin (ZITHROMAX) 250 mg tablet Take 1 Tab by mouth every Monday, Wednesday, Friday. Monday-Friday. 30 Tab 0    omeprazole (PRILOSEC) 40 mg capsule Take 40 mg by mouth daily.  insulin glargine (Basaglar KwikPen U-100 Insulin) 100 unit/mL (3 mL) inpn 45 Units by SubCUTAneous route nightly.  furosemide (Lasix) 20 mg tablet Take 20 mg by mouth daily.  lactobacillus sp. 50 billion cpu (BIO-K PLUS) 50 billion cell -375 mg cap capsule Take 1 Cap by mouth daily. 30 Cap 0    simethicone (GAS-X) 125 mg capsule Take 125 mg by mouth four (4) times daily as needed for Flatulence.  loratadine (CLARITIN) 10 mg tablet Take 10 mg by mouth daily as needed for Allergies.  lisinopril (PRINIVIL, ZESTRIL) 20 mg tablet Take 20 mg by mouth two (2) times a day.  hydroCHLOROthiazide (HYDRODIURIL) 12.5 mg tablet Take 12.5 mg by mouth daily.  tiotropium bromide (SPIRIVA RESPIMAT) 2.5 mcg/actuation inhaler Take 2 Puffs by inhalation daily.  NOVOLOG FLEXPEN U-100 INSULIN 100 unit/mL inpn Continue home Sliding scale insulin as prior to admission (Patient taking differently: 1 Units by SubCUTAneous route three (3) times daily. If BG <100=0u  101-150=5u  151-250=8u  251-300=12u  >300 call MD  ) 1 Pen 0    OXYGEN-AIR DELIVERY SYSTEMS 2 L by Nasal route continuous. [facility]      aspirin delayed-release 81 mg tablet Take 81 mg by mouth daily.  montelukast (SINGULAIR) 10 mg tablet Take 10 mg by mouth nightly.          Past History     Past Medical History:  Past Medical History:   Diagnosis Date    Asthma     Chronic lung disease     COPD     Cystocele, midline     Diabetes mellitus ([facility] [address]     GERD (gastroesophageal reflux disease)     Hidradenitis suppurativa     Hyperlipidemia     Hypertension     SHERRIE on CPAP     CPAP    Stress incontinence        Past Surgical History:  Past Surgical History:   Procedure Laterality Date    BREAST SURGERY PROCEDURE UNLISTED      Right breast benign tumor removal    HX APPENDECTOMY  HX CHOLECYSTECTOMY      HX DILATION AND CURETTAGE      Dysfunctional uterine bleeding, thought 2/2 fibroids    HX TUBAL LIGATION         Family History:  Family History   Problem Relation Age of Onset    Hypertension Mother     Stroke Mother     Breast Cancer Mother         Bilateral mastectomies    Cancer Mother         ovarian and breast    Heart Failure Mother     Heart Attack Father         2011    Heart Surgery Father         CABG    Heart Failure Father     COPD Sister         Heavy smoker    Cancer Sister         ovarian    Heart Failure Sister     Lung Disease Sister     Asthma Child     Cancer Maternal Aunt         pancreatic    Cancer Maternal Grandfather         stomach       Social History:  Social History     Tobacco Use    Smoking status: Former Smoker     Packs/day: 1.00     Years: 30.00     Pack years: 30.00     Types: Cigarettes     Start date: 1966     Last attempt to quit: 2006     Years since quittin.9    Smokeless tobacco: Former User    Tobacco comment: 1-1.5 packs per day   Substance Use Topics    Alcohol use: No    Drug use: No       Allergies: Allergies   Allergen Reactions    Ancef [Cefazolin] Hives    Contrast Agent [Iodine] Anaphylaxis, Shortness of Breath and Swelling     Needs pre-medication for IV contrast with Benadryl, Solu-Medrol    Fish Containing Products Anaphylaxis     Pt states she had a severe allergic reaction at 7 y/o.  Statins-Hmg-Coa Reductase Inhibitors Myalgia    Metformin Other (comments)     Abdominal pain, diarrhea.  Codeine Other (comments)     Altered mental status         Review of Systems   Review of Systems   Constitutional: Negative for chills and fever. Respiratory: Negative for shortness of breath. Cardiovascular: Negative for chest pain. Gastrointestinal: Positive for abdominal pain. Negative for nausea and vomiting. Genitourinary: Negative for flank pain.    Musculoskeletal: Negative for back pain and myalgias. Skin: Negative for color change, pallor, rash and wound. Neurological: Negative for dizziness, weakness and light-headedness. All other systems reviewed and are negative. Physical Exam   Physical Exam  Vitals signs and nursing note reviewed. Constitutional:       General: She is not in acute distress. Appearance: She is well-developed. Comments: Patient in pain in NAD   HENT:      Head: Normocephalic and atraumatic. Eyes:      Conjunctiva/sclera: Conjunctivae normal.   Cardiovascular:      Rate and Rhythm: Normal rate and regular rhythm. Heart sounds: Normal heart sounds. Pulmonary:      Effort: Pulmonary effort is normal. No respiratory distress. Breath sounds: Normal breath sounds. Abdominal:      General: Bowel sounds are normal. There is no distension. Palpations: Abdomen is soft. Comments: Umbilical hernia visualized and not currently reducible  TTP   Musculoskeletal: Normal range of motion. Skin:     General: Skin is warm. Findings: No rash. Neurological:      Mental Status: She is alert and oriented to person, place, and time. Psychiatric:         Behavior: Behavior normal.         Diagnostic Study Results     Labs -     Recent Results (from the past 12 hour(s))   CBC WITH AUTOMATED DIFF    Collection Time: 08/31/20  6:08 PM   Result Value Ref Range    WBC 8.1 4.6 - 13.2 K/uL    RBC 4.16 (L) 4.20 - 5.30 M/uL    HGB 12.7 12.0 - 16.0 g/dL    HCT 38.6 35.0 - 45.0 %    MCV 92.8 74.0 - 97.0 FL    MCH 30.5 24.0 - 34.0 PG    MCHC 32.9 31.0 - 37.0 g/dL    RDW 15.2 (H) 11.6 - 14.5 %    PLATELET 279 920 - 364 K/uL    MPV 8.9 (L) 9.2 - 11.8 FL    NEUTROPHILS 85 (H) 40 - 73 %    LYMPHOCYTES 9 (L) 21 - 52 %    MONOCYTES 6 3 - 10 %    EOSINOPHILS 0 0 - 5 %    BASOPHILS 0 0 - 2 %    ABS. NEUTROPHILS 6.8 1.8 - 8.0 K/UL    ABS. LYMPHOCYTES 0.8 (L) 0.9 - 3.6 K/UL    ABS. MONOCYTES 0.5 0.05 - 1.2 K/UL    ABS. EOSINOPHILS 0.0 0.0 - 0.4 K/UL    ABS.  BASOPHILS 0.0 0.0 - 0.1 K/UL    DF AUTOMATED     METABOLIC PANEL, COMPREHENSIVE    Collection Time: 08/31/20  6:08 PM   Result Value Ref Range    Sodium 137 136 - 145 mmol/L    Potassium 4.5 3.5 - 5.5 mmol/L    Chloride 103 100 - 111 mmol/L    CO2 31 21 - 32 mmol/L    Anion gap 3 3.0 - 18 mmol/L    Glucose 261 (H) 74 - 99 mg/dL    BUN 13 7.0 - 18 MG/DL    Creatinine 0.98 0.6 - 1.3 MG/DL    BUN/Creatinine ratio 13 12 - 20      GFR est AA >60 >60 ml/min/1.73m2    GFR est non-AA 58 (L) >60 ml/min/1.73m2    Calcium 9.6 8.5 - 10.1 MG/DL    Bilirubin, total 0.7 0.2 - 1.0 MG/DL    ALT (SGPT) 59 (H) 13 - 56 U/L    AST (SGOT) 20 10 - 38 U/L    Alk. phosphatase 86 45 - 117 U/L    Protein, total 7.7 6.4 - 8.2 g/dL    Albumin 3.4 3.4 - 5.0 g/dL    Globulin 4.3 (H) 2.0 - 4.0 g/dL    A-G Ratio 0.8 0.8 - 1.7     LIPASE    Collection Time: 08/31/20  6:08 PM   Result Value Ref Range    Lipase 65 (L) 73 - 393 U/L   URINALYSIS W/ RFLX MICROSCOPIC    Collection Time: 08/31/20  6:08 PM   Result Value Ref Range    Color YELLOW      Appearance CLEAR      Specific gravity >1.030 (H) 1.005 - 1.030    pH (UA) 5.0 5.0 - 8.0      Protein Negative NEG mg/dL    Glucose >1,000 (A) NEG mg/dL    Ketone TRACE (A) NEG mg/dL    Bilirubin Negative NEG      Blood Negative NEG      Urobilinogen 1.0 0.2 - 1.0 EU/dL    Nitrites Negative NEG      Leukocyte Esterase Negative NEG     MAGNESIUM    Collection Time: 08/31/20  6:08 PM   Result Value Ref Range    Magnesium 1.7 1.6 - 2.6 mg/dL       Radiologic Studies -   CT ABD PELV WO CONT   Final Result   IMPRESSION:      There is umbilical hernia containing a loop of bowel, with associated dilatation   of the loop entering this hernia, mesenteric edema and possible mild small bowel   wall thickening. Could be incarcerated. Question is the hernia reducible? Diverticulosis coli. Hepatic steatosis.         CT Results  (Last 48 hours)               08/31/20 1703  CT ABD PELV WO CONT Final result    Impression: IMPRESSION:       There is umbilical hernia containing a loop of bowel, with associated dilatation   of the loop entering this hernia, mesenteric edema and possible mild small bowel   wall thickening. Could be incarcerated. Question is the hernia reducible? Diverticulosis coli. Hepatic steatosis. Narrative:  CT Of The Abdomen And Pelvis Without Contrast       CPT CODE 09851,08017       CLINICAL HISTORY: Possible incarcerated umbilical hernia. Patient complaining of   abdominal pain since 11:00 AM and history of umbilical hernia. Nausea and unable   to walk. Hernia previously reducible. Constant umbilical pain for 5 hours. .       TECHNIQUE: 5 mm helical MDCT scan was obtained of the abdomen and pelvis. Sagittal and coronal images created from original data set. All CT scans at   this facility are performed using dose optimization techniques as appropriate to   a performed exam, to include automated exposure control, adjustment of the mA   and/or kV according to patient's size (including appropriate matching for site   specific examinations), or use of iterative reconstruction technique. i       COMPARISON: 7/30/2020. FINDINGS:        CT Of The Abdomen and pelvis:       Lung bases, heart and pericardium: Subsegmental atelectasis right middle lobe. No effusions. Heart and pericardium unremarkable. .   Liver: Overall low attenuation. Gallbladder/biliary:Prior cholecystectomy. No duct dilatation. Spleen:Normal       Pancreas:Normal       Adrenals:Normal       Kidneys/ureters:No hydronephrosis or nephrolithiasis       Bowel:Stomach and duodenum and small bowel proximally normal and nondilated. There is distal ileal small bowel loop contained in an umbilical hernia with   haziness in the mesenteric fat associated with these loop and trace fluid. Possible small bowel wall thickening at loop contained within the hernia. Hernia   defect measures about 3.4 x 2.9 cm.  Loop leading into the hernia measures 32 mm. Loop leading out of the hernia is relatively collapsed (64). Haziness adjacent   omentum. The colon is normal proximally. There are scattered left-sided   diverticula. Scattered diverticula also at elongated sigmoid. Lymph nodes: No adenopathy       Peritoneal space:No free fluid       Retroperitoneum:Heavy arteriosclerosis aortoiliac vessels       :Uterus normal. No adnexal masses. Urinary bladder normal.       MSK :No acute abnormalities. CXR Results  (Last 48 hours)    None          Medical Decision Making   I am the first provider for this patient. I reviewed the vital signs, available nursing notes, past medical history, past surgical history, family history and social history. Vital Signs-Reviewed the patient's vital signs. Patient Vitals for the past 12 hrs:   Temp Pulse Resp BP SpO2   08/31/20 1923 -- -- -- -- 97 %   08/31/20 1545 97.8 °F (36.6 °C) 95 17 142/78 --       Records Reviewed: Nursing Notes and Old Medical Records    Provider Notes (Medical Decision Making):   DDx: Reducible vs Nonreducible hernia    62 yo F who presents due to nonreducible umbilical hernia x5 hours. CT scan show possible incarcerated hernia. After multiple attempts, hernia reduced. Patient nontoxic-appearing, afebrile, not tachycardic. Patient with prompt outpatient follow-up with PCP and general surgery in 1-2 days. Patient with strict instructions to return if symptoms worsen. ED Course:   Initial assessment performed. The patients presenting problems have been discussed, and they are in agreement with the care plan formulated and outlined with them. I have encouraged them to ask questions as they arise throughout their visit. Umbilical hernia successfully reduced after multiiple attempts. Pt reports immediate pain improvement. Disposition:  7:24 PM  Discussed lab and imaging results with pt along with dx and treatment plan.  Discussed importance of PCP follow up. All questions answered. Pt voiced they understood. Return if sx worsen. PLAN:  1. Current Discharge Medication List      CONTINUE these medications which have NOT CHANGED    Details   predniSONE (DELTASONE) 10 mg tablet Take 40 mg by mouth daily for 7 days, THEN 30 mg daily for 7 days, THEN 20 mg daily for 7 days, THEN 10 mg daily for 7 days. Qty: 70 Tab, Refills: 0    Associated Diagnoses: Asthma-COPD overlap syndrome ([facility] Poorly controlled severe persistent asthma with acute exacerbation      fluticasone propion-salmeteroL (ADVAIR HFA) 230-21 mcg/actuation inhaler Take 2 Puffs by inhalation two (2) times a day. Qty: 3 Each, Refills: 3    Associated Diagnoses: Asthma-COPD overlap syndrome ([facility] Poorly controlled severe persistent asthma with acute exacerbation      ipratropium-albuteroL (COMBIVENT RESPIMAT)  mcg/actuation inhaler Take 1 Puff by inhalation every four (4) hours as needed for Wheezing. Qty: 1 Inhaler, Refills: 0      acetaminophen (TYLENOL) 500 mg tablet Take 500 mg by mouth every six (6) hours as needed for Fever or Pain. albuterol (PROVENTIL HFA, VENTOLIN HFA, PROAIR HFA) 90 mcg/actuation inhaler Take 2 Puffs by inhalation every four (4) hours as needed for Wheezing. Qty: 1 Inhaler, Refills: 0      azithromycin (ZITHROMAX) 250 mg tablet Take 1 Tab by mouth every Monday, Wednesday, Friday. Monday-Friday. Qty: 30 Tab, Refills: 0    Associated Diagnoses: COPD, group D, by GOLD 2017 classification ([facility]      omeprazole (PRILOSEC) 40 mg capsule Take 40 mg by mouth daily. insulin glargine (Basaglar KwikPen U-100 Insulin) 100 unit/mL (3 mL) inpn 45 Units by SubCUTAneous route nightly. furosemide (Lasix) 20 mg tablet Take 20 mg by mouth daily. lactobacillus sp. 50 billion cpu (BIO-K PLUS) 50 billion cell -375 mg cap capsule Take 1 Cap by mouth daily.   Qty: 30 Cap, Refills: 0      simethicone (GAS-X) 125 mg capsule Take 125 mg by mouth four (4) times daily as needed for Flatulence. loratadine (CLARITIN) 10 mg tablet Take 10 mg by mouth daily as needed for Allergies. lisinopril (PRINIVIL, ZESTRIL) 20 mg tablet Take 20 mg by mouth two (2) times a day. hydroCHLOROthiazide (HYDRODIURIL) 12.5 mg tablet Take 12.5 mg by mouth daily. tiotropium bromide (SPIRIVA RESPIMAT) 2.5 mcg/actuation inhaler Take 2 Puffs by inhalation daily. NOVOLOG FLEXPEN U-100 INSULIN 100 unit/mL inpn Continue home Sliding scale insulin as prior to admission  Qty: 1 Pen, Refills: 0      OXYGEN-AIR DELIVERY SYSTEMS 2 L by Nasal route continuous. [facility] Choice      aspirin delayed-release 81 mg tablet Take 81 mg by mouth daily. montelukast (SINGULAIR) 10 mg tablet Take 10 mg by mouth nightly. 2.   Follow-up Information     Follow up With Specialties Details Why Contact Lanie Awad MD Family Medicine Schedule an appointment as soon as possible for a visit in 1 day  [address]      Maren Dolan MD General Surgery Schedule an appointment as soon as possible for a visit in 1 day  [phone]      [facility] DEPT Emergency Medicine  If symptoms worsen [address]  [phone]        Return to ED if worse     Diagnosis     Clinical Impression:   1. Umbilical hernia without obstruction and without gangrene    2. Hyperglycemia        Attestations:    ANALI Mtz    Please note that this dictation was completed with [employer], the computer voice recognition software. Quite often unanticipated grammatical, syntax, homophones, and other interpretive errors are inadvertently transcribed by the computer software. Please disregard these errors. Please excuse any errors that have escaped final proofreading. Thank you.

## 2020-08-31 NOTE — DISCHARGE INSTRUCTIONS
Patient Education        Umbilical Hernia: Care Instructions  Overview     An umbilical hernia is a bulge near the belly button, or navel. Intestines or other tissues may bulge through an opening or a weak spot in the stomach muscles. The hernia has a sac that may hold some intestine, fat, or fluid. Many umbilical hernias are caused by pressure near the belly button. Pressure may come from increased weight, repeated straining, or pregnancy. A very small hernia may not cause problems. But your doctor may recommend repairing the muscle. This helps you avoid the risk that the hernia might trap some of the tissues or intestine. This could be an emergency. Follow-up care is a key part of your treatment and safety. Be sure to make and go to all appointments, and call your doctor if you are having problems. It's also a good idea to know your test results and keep a list of the medicines you take. How can you care for yourself at home? · Watch for any signs that the hernia may be causing problems. Your belly may get bigger, and the skin over the hernia may look red. You may have pain or feel bulging or pressure from the hernia. Call your doctor right away if you see these signs. When should you call for help? Call your doctor now or seek immediate medical care if:  · You have new or worse belly pain. · You vomit. · You cannot pass stool or gas. · You can't push the hernia back into place with gentle pressure when you are lying down. · The area over the hernia turns red or becomes tender. Watch closely for changes in your health, and be sure to contact your doctor if you have any problems. Current as of: August 12, 2019               Content Version: 12.5  © 4458-3150 [employer], [employer]. Care instructions adapted under license by [employer] (which disclaims liability or warranty for this information).  If you have questions about a medical condition or this instruction, always ask your healthcare professional. [address] any warranty or liability for your use of this information.

## 2020-09-01 ENCOUNTER — PATIENT OUTREACH (OUTPATIENT)
Dept: CASE MANAGEMENT | Age: 60
End: 2020-09-01

## 2020-09-01 PROCEDURE — 3331090001 HH PPS REVENUE CREDIT

## 2020-09-01 PROCEDURE — 3331090002 HH PPS REVENUE DEBIT

## 2020-09-01 NOTE — PROGRESS NOTES
Patient contacted regarding COVID-19  risk. Care Transition Nurse/ Ambulatory Care Manager contacted the patient by telephone to perform post discharge assessment. Verified name and  with patient as identifiers. Provided introduction to self, and explanation of the CTN/ACM role, and reason for call due to risk factors for infection and/or exposure to COVID-19. Symptoms reviewed with patient who verbalized the following symptoms: no new symptoms and no worsening symptoms. Due to no new or worsening symptoms encounter was not routed to provider for escalation. Patient has following risk factors of: COPD, asthma and diabetes. CTN/ACM reviewed discharge instructions, medical action plan and red flags such as increased shortness of breath, increasing fever and signs of decompensation with patient who verbalized understanding. Discussed exposure protocols and quarantine with CDC Guidelines What to do if you are sick with coronavirus disease .  Patient was given an opportunity for questions and concerns. The patient agrees to contact the [facility] exposure line [phone], [facility]  ([phone]) and PCP office for questions related to their healthcare. CTN/ACM provided contact information for future needs. Reviewed and educated patient on any new and changed medications related to discharge diagnosis. Patient/family/caregiver given information for [employer] and agrees to enroll     Patient's preferred e-mail:    Patient's preferred phone number:     Based on Loop alert triggers, patient will be contacted by nurse care manager for worsening symptoms. Plan for follow-up call in 5-7 days based on severity of symptoms and risk factors.

## 2020-09-02 ENCOUNTER — HOME CARE VISIT (OUTPATIENT)
Dept: SCHEDULING | Facility: HOME HEALTH | Age: 60
End: 2020-09-02
Payer: MEDICARE

## 2020-09-02 PROCEDURE — 3331090001 HH PPS REVENUE CREDIT

## 2020-09-02 PROCEDURE — 3331090002 HH PPS REVENUE DEBIT

## 2020-09-03 ENCOUNTER — HOSPITAL ENCOUNTER (OUTPATIENT)
Dept: INFUSION THERAPY | Age: 60
Discharge: HOME | End: 2020-09-03
Payer: MEDICARE

## 2020-09-03 VITALS
DIASTOLIC BLOOD PRESSURE: 77 MMHG | RESPIRATION RATE: 18 BRPM | OXYGEN SATURATION: 95 % | SYSTOLIC BLOOD PRESSURE: 130 MMHG | HEART RATE: 88 BPM | TEMPERATURE: 97.5 F

## 2020-09-03 DIAGNOSIS — J45.51 SEVERE PERSISTENT ASTHMA WITH EXACERBATION: Primary | ICD-10-CM

## 2020-09-03 PROCEDURE — 3331090002 HH PPS REVENUE DEBIT

## 2020-09-03 PROCEDURE — 96372 THER/PROPH/DIAG INJ SC/IM: CPT

## 2020-09-03 PROCEDURE — 74011250636 HC RX REV CODE- 250/636: Performed by: INTERNAL MEDICINE

## 2020-09-03 PROCEDURE — 3331090001 HH PPS REVENUE CREDIT

## 2020-09-03 RX ADMIN — BENRALIZUMAB 30 MG: 30 INJECTION, SOLUTION SUBCUTANEOUS at 10:34

## 2020-09-03 NOTE — PROGRESS NOTES
[facility] Progress Note    Date: September 3, 2020    Name: Rosa Sandoval    MRN: [id number]         : 1960    Thelma Stearns 1/3    Ms. Pilo Garcia arrived to [facility] at 7263. Ms. Pilo Garcia was assessed and education was provided. Care notes given for Thelam Stearns and voices understanding. Ms. Hays Severs vitals were reviewed. Visit Vitals  /83 (BP 1 Location: Left arm, BP Patient Position: Sitting)   Pulse 90   Temp 97.5 °F (36.4 °C)   Resp 18   SpO2 95%           Fasenra 30 mg was administered as ordered SQ in patient's right upper arm. Ms. Pilo Garcia tolerated well without complaints. Pt stayed 30 mins observation with no side effects noted. Patient Vitals for the past 4 hrs:   Temp Pulse Resp BP SpO2   20 1109 97.5 °F (36.4 °C) 88 18 130/77 95 %   20 1005 97.5 °F (36.4 °C) 90 18 123/83 95 %           Ms. Pilo Garcia was discharged from [place] [address] in stable condition at 1115. She is to return on 10/1/20 at 56 for her next appointment.     Devang Loredo RN  September 3, 2020

## 2020-09-04 PROCEDURE — 3331090001 HH PPS REVENUE CREDIT

## 2020-09-04 PROCEDURE — 3331090002 HH PPS REVENUE DEBIT

## 2020-09-05 PROCEDURE — 3331090002 HH PPS REVENUE DEBIT

## 2020-09-05 PROCEDURE — 3331090001 HH PPS REVENUE CREDIT

## 2020-09-06 PROCEDURE — 3331090001 HH PPS REVENUE CREDIT

## 2020-09-06 PROCEDURE — 3331090002 HH PPS REVENUE DEBIT

## 2020-09-07 PROCEDURE — 3331090001 HH PPS REVENUE CREDIT

## 2020-09-07 PROCEDURE — 3331090002 HH PPS REVENUE DEBIT

## 2020-09-08 PROCEDURE — 3331090001 HH PPS REVENUE CREDIT

## 2020-09-08 PROCEDURE — 3331090002 HH PPS REVENUE DEBIT

## 2020-09-09 ENCOUNTER — HOME CARE VISIT (OUTPATIENT)
Dept: SCHEDULING | Facility: HOME HEALTH | Age: 60
End: 2020-09-09
Payer: MEDICARE

## 2020-09-09 ENCOUNTER — PATIENT OUTREACH (OUTPATIENT)
Dept: CASE MANAGEMENT | Age: 60
End: 2020-09-09

## 2020-09-09 PROCEDURE — G0299 HHS/HOSPICE OF RN EA 15 MIN: HCPCS

## 2020-09-09 PROCEDURE — 3331090003 HH PPS REVENUE ADJ

## 2020-09-09 PROCEDURE — 3331090002 HH PPS REVENUE DEBIT

## 2020-09-09 PROCEDURE — A6450 LT COMPRES BAND >=5"/YD: HCPCS

## 2020-09-09 PROCEDURE — 400013 HH SOC

## 2020-09-09 PROCEDURE — 3331090001 HH PPS REVENUE CREDIT

## 2020-09-09 NOTE — PROGRESS NOTES
COVID-19 Screening Follow-up Note    Patient contacted regarding COVID-19 risk and screening. Care Transition Nurse/ Ambulatory Care Manager contacted the patient by telephone to perform follow-up assessment. Verified name and  with patient as identifiers. Patient has following risk factors of: COPD, asthma and diabetes. Symptoms reviewed with patient who verbalized the following symptoms: no new symptoms and no worsening symptoms. Due to no new or worsening symptoms encounter was not routed to provider for escalation. Education provided regarding infection prevention, and signs and symptoms of COVID-19 and when to seek medical attention with patient who verbalized understanding. Discussed exposure protocols and quarantine from [address] you at higher risk for severe illness  and given an opportunity for questions and concerns. The patient agrees to contact the COVID-19 hotline [phone] or PCP office for questions related to their healthcare. CTN/ACM provided contact information for future reference. From CDC: Are you at higher risk for severe illness?  Wash your hands often.  Avoid close contact (6 feet, which is about two arm lengths) with people who are sick.  Put distance between yourself and other people if COVID-19 is spreading in your community.  Clean and disinfect frequently touched surfaces.  Avoid all cruise travel and non-essential air travel.  Call your healthcare professional if you have concerns about COVID-19 and your underlying condition or if you are sick. For more information on steps you can take to protect yourself, see CDC's How to [place] for follow-up call in 5-7 days based on severity of symptoms and risk factors.

## 2020-09-10 VITALS
OXYGEN SATURATION: 97 % | RESPIRATION RATE: 20 BRPM | TEMPERATURE: 97.6 F | SYSTOLIC BLOOD PRESSURE: 144 MMHG | DIASTOLIC BLOOD PRESSURE: 76 MMHG | HEART RATE: 86 BPM

## 2020-09-10 PROCEDURE — 3331090001 HH PPS REVENUE CREDIT

## 2020-09-10 PROCEDURE — A6450 LT COMPRES BAND >=5"/YD: HCPCS

## 2020-09-10 PROCEDURE — 3331090002 HH PPS REVENUE DEBIT

## 2020-09-15 ENCOUNTER — PATIENT OUTREACH (OUTPATIENT)
Dept: CASE MANAGEMENT | Age: 60
End: 2020-09-15

## 2020-09-15 NOTE — PROGRESS NOTES
Patient resolved from Transition of Care episode on 9-15-20  Patient/family has been provided the following resources and education related to COVID-19:                         Signs, symptoms and red flags related to COVID-19            CDC exposure and quarantine guidelines            [facility] exposure contact - [phone]            Contact for their local Department of Health                 Patient currently reports that the following symptoms have improved:  no new symptoms and no worsening symptoms. No further outreach scheduled with this CTN/ACM. Episode of Care resolved. Patient has this CTN/ACM contact information if future needs arise.

## 2020-09-17 DIAGNOSIS — J45.51 SEVERE PERSISTENT ASTHMA WITH EXACERBATION: ICD-10-CM

## 2020-09-25 ENCOUNTER — APPOINTMENT (OUTPATIENT)
Dept: INFUSION THERAPY | Age: 60
End: 2020-09-25
Payer: MEDICARE

## 2020-09-28 ENCOUNTER — HOSPITAL ENCOUNTER (OUTPATIENT)
Dept: MAMMOGRAPHY | Age: 60
Discharge: HOME | End: 2020-09-28
Attending: FAMILY MEDICINE
Payer: MEDICARE

## 2020-09-28 ENCOUNTER — HOSPITAL ENCOUNTER (OUTPATIENT)
Dept: ULTRASOUND IMAGING | Age: 60
Discharge: HOME | End: 2020-09-28
Attending: FAMILY MEDICINE
Payer: MEDICARE

## 2020-09-28 DIAGNOSIS — N63.41 SUBAREOLAR MASS OF RIGHT BREAST: ICD-10-CM

## 2020-09-28 DIAGNOSIS — N63.10 MASS OF RIGHT BREAST: ICD-10-CM

## 2020-09-28 DIAGNOSIS — N63.0 BREAST MASS: ICD-10-CM

## 2020-09-28 PROCEDURE — 76642 ULTRASOUND BREAST LIMITED: CPT

## 2020-09-28 PROCEDURE — 77062 BREAST TOMOSYNTHESIS BI: CPT

## 2020-10-01 ENCOUNTER — HOSPITAL ENCOUNTER (OUTPATIENT)
Dept: INFUSION THERAPY | Age: 60
Discharge: HOME | End: 2020-10-01
Payer: MEDICARE

## 2020-10-01 VITALS
OXYGEN SATURATION: 94 % | DIASTOLIC BLOOD PRESSURE: 80 MMHG | HEART RATE: 95 BPM | RESPIRATION RATE: 18 BRPM | SYSTOLIC BLOOD PRESSURE: 147 MMHG | TEMPERATURE: 96.7 F

## 2020-10-01 DIAGNOSIS — J45.51 SEVERE PERSISTENT ASTHMA WITH EXACERBATION: Primary | ICD-10-CM

## 2020-10-01 PROCEDURE — 74011250636 HC RX REV CODE- 250/636: Performed by: INTERNAL MEDICINE

## 2020-10-01 PROCEDURE — 96372 THER/PROPH/DIAG INJ SC/IM: CPT

## 2020-10-01 RX ORDER — PREDNISONE 5 MG/1
5 TABLET ORAL DAILY
COMMUNITY
End: 2021-01-18

## 2020-10-01 RX ADMIN — BENRALIZUMAB 30 MG: 30 INJECTION, SOLUTION SUBCUTANEOUS at 10:56

## 2020-10-01 NOTE — PROGRESS NOTES
[facility] Progress Note    Date: 2020    Name: Callie Hutton    MRN: [id number]         : 1960    Horace Etienne 2/3    Ms. Anabelle Cowart arrived to [facility] at [address]. Ms. Anabelle Cowart was assessed and education was provided. Ms. Zev Haney vitals were reviewed. Visit Vitals  BP (!) 147/80 (BP 1 Location: Left arm, BP Patient Position: Sitting)   Pulse 95   Temp (!) 96.7 °F (35.9 °C)   Resp 18   SpO2 94%       Fasenra 30 mg was administered as ordered SQ in patient's left upper arm. Ms. Anabelle Cowart tolerated well without complaints. Patient Vitals for the past 4 hrs:   Temp Pulse Resp BP SpO2   10/01/20 1049 (!) 96.7 °F (35.9 °C) 95 18 (!) 147/80 94 %       Ms. Anabelle Cowart was discharged from [place] [address] in stable condition at 1100. She is to return on 10/29/20 at 55 for her next appointment.     Mag Ny RN  2020

## 2020-10-09 ENCOUNTER — NURSE NAVIGATOR (OUTPATIENT)
Dept: OTHER | Age: 60
End: 2020-10-09

## 2020-10-09 NOTE — NURSE NAVIGATOR
LM w/ family regarding prescreening for LDCT lung cancer screening scheduled for 10/14/2020.  Prescreening must be complete prior to exam.      John Flowers, TRAVISN, RN, [address] [address] [facility] Nurse Navigator

## 2020-10-12 NOTE — NURSE NAVIGATOR
LMOVM regarding prescreening for LDCT lung cancer screening scheduled for 10/14/2020.  Prescreening must be complete prior to exam.      Beata Mayfield, TRAVISN, RN, [address] [facility] Nurse Navigator

## 2020-10-12 NOTE — NURSE NAVIGATOR
Referring Provider: Gilberto Pollard MD      Lung Cancer Risk Profile:   Age: 63  Gender: Female  Height: 63\"  Weight: 264#    Smoking History:  Smoking Status: past use  # years smokin  # years quit: 13 ()  Packs/day: 1.5  Pack years: 50    Patient discussed smoking cessation with PCP: Unknown    Patient participated in shared decision making process with PCP: Yes, per provider note    Patient is currently experiencing symptoms: No, per patient report    If yes what symptoms:     Co-Morbidities:  COPD    Cancer History:      Additional Risk Factors:    Exposure to second hand smoke      Patient's smoking history discussed via phone. Patient meets LDCT lung cancer screening criteria. Appointment scheduled for 10/14/2020 verified with Ms. Mary Kate Dumas.       Gio Randhawa BSN, RN, OCN  Lung Health Nurse Navigator

## 2020-10-15 DIAGNOSIS — J45.51 SEVERE PERSISTENT ASTHMA WITH EXACERBATION: ICD-10-CM

## 2020-10-28 PROBLEM — M62.08 DIASTASIS RECTI: Status: ACTIVE | Noted: 2020-10-28

## 2020-10-28 PROBLEM — M81.0 OSTEOPOROSIS: Status: ACTIVE | Noted: 2020-10-28

## 2020-10-28 PROBLEM — R09.1 PLEURISY: Status: ACTIVE | Noted: 2020-10-28

## 2020-10-28 PROBLEM — G47.10 HYPERSOMNIA: Status: ACTIVE | Noted: 2020-10-28

## 2020-10-28 PROBLEM — Z88.9 H/O ALLERGY: Status: ACTIVE | Noted: 2020-10-28

## 2020-10-28 PROBLEM — I51.89 DIASTOLIC DYSFUNCTION: Status: ACTIVE | Noted: 2020-10-28

## 2020-10-28 PROBLEM — K57.92 DIVERTICULITIS: Status: ACTIVE | Noted: 2020-10-28

## 2020-10-28 PROBLEM — E55.9 VITAMIN D DEFICIENCY: Status: ACTIVE | Noted: 2020-10-28

## 2020-10-28 PROBLEM — R91.1 LUNG NODULE: Status: ACTIVE | Noted: 2020-10-28

## 2020-10-28 PROBLEM — F41.9 ANXIETY: Status: ACTIVE | Noted: 2020-10-28

## 2020-10-28 PROBLEM — M19.90 OSTEOARTHRITIS: Status: ACTIVE | Noted: 2020-10-28

## 2020-10-28 PROBLEM — Z72.821 INADEQUATE SLEEP HYGIENE: Status: ACTIVE | Noted: 2020-10-28

## 2020-10-28 PROBLEM — N39.0 URINARY TRACT INFECTION: Status: ACTIVE | Noted: 2020-10-28

## 2020-10-28 PROBLEM — R10.11 RIGHT UPPER QUADRANT ABDOMINAL PAIN: Status: ACTIVE | Noted: 2020-10-28

## 2020-10-28 PROBLEM — G47.61 PERIODIC LIMB MOVEMENTS OF SLEEP: Status: ACTIVE | Noted: 2020-10-28

## 2020-10-28 PROBLEM — R35.0 URINARY FREQUENCY: Status: ACTIVE | Noted: 2020-10-28

## 2020-10-28 PROBLEM — J44.1 OBSTRUCTIVE CHRONIC BRONCHITIS WITH ACUTE EXACERBATION (HCC): Status: ACTIVE | Noted: 2020-10-28

## 2020-10-28 PROBLEM — H60.509 ACUTE OTITIS EXTERNA: Status: ACTIVE | Noted: 2020-10-28

## 2020-10-28 PROBLEM — R00.0 RACING HEART BEAT: Status: ACTIVE | Noted: 2020-10-28

## 2020-10-28 PROBLEM — K76.0 HEPATIC STEATOSIS: Status: ACTIVE | Noted: 2020-10-28

## 2020-10-28 RX ORDER — FAMOTIDINE 20 MG/1
20 TABLET, FILM COATED ORAL
Status: ON HOLD | COMMUNITY
Start: 2016-09-23 | End: 2020-12-18

## 2020-10-28 RX ORDER — ERYTHROMYCIN 5 MG/G
OINTMENT OPHTHALMIC
COMMUNITY
Start: 2016-03-21 | End: 2020-11-05

## 2020-10-28 RX ORDER — INSULIN PUMP SYRINGE, 3 ML
EACH MISCELLANEOUS
COMMUNITY
Start: 2017-06-16

## 2020-10-28 RX ORDER — INSULIN LISPRO 100 [IU]/ML
INJECTION, SOLUTION INTRAVENOUS; SUBCUTANEOUS
Status: ON HOLD | COMMUNITY
Start: 2017-02-14 | End: 2020-12-18

## 2020-10-28 RX ORDER — ATORVASTATIN CALCIUM 40 MG/1
TABLET, FILM COATED ORAL
COMMUNITY
Start: 2013-04-18 | End: 2020-11-05

## 2020-10-28 RX ORDER — GLIPIZIDE 10 MG/1
TABLET ORAL
COMMUNITY
Start: 2020-11-05 | End: 2020-11-05

## 2020-10-28 RX ORDER — LANCETS 28 GAUGE
EACH MISCELLANEOUS
COMMUNITY
Start: 2017-06-16

## 2020-10-28 RX ORDER — LANSOPRAZOLE 30 MG/1
CAPSULE, DELAYED RELEASE ORAL
COMMUNITY
Start: 2015-06-30 | End: 2020-11-05

## 2020-10-28 RX ORDER — IPRATROPIUM BROMIDE 0.5 MG/2.5ML
SOLUTION RESPIRATORY (INHALATION)
COMMUNITY
Start: 2015-05-22 | End: 2020-11-05

## 2020-10-28 RX ORDER — BLOOD-GLUCOSE METER
KIT MISCELLANEOUS
COMMUNITY
Start: 2017-06-16

## 2020-10-28 RX ORDER — FLUTICASONE PROPIONATE 50 MCG
2 SPRAY, SUSPENSION (ML) NASAL DAILY
COMMUNITY
Start: 2010-08-26 | End: 2021-08-31 | Stop reason: SDUPTHER

## 2020-10-28 RX ORDER — OXYCODONE AND ACETAMINOPHEN 5; 325 MG/1; MG/1
1 TABLET ORAL
Status: ON HOLD | COMMUNITY
Start: 2020-08-02 | End: 2020-12-18

## 2020-10-28 RX ORDER — CALCIUM CARB/VITAMIN D3/VIT K1 500-100-40
TABLET,CHEWABLE ORAL
COMMUNITY
Start: 2017-02-14

## 2020-10-28 RX ORDER — GUAIFENESIN 600 MG/1
600 TABLET, EXTENDED RELEASE ORAL
Status: ON HOLD | COMMUNITY
Start: 2015-09-01 | End: 2020-12-18

## 2020-10-28 RX ORDER — BUDESONIDE 0.5 MG/2ML
INHALANT ORAL
COMMUNITY
Start: 2016-05-10 | End: 2020-11-05

## 2020-10-28 RX ORDER — ACETYLCYSTEINE 600 MG
CAPSULE ORAL
COMMUNITY
Start: 2015-12-30 | End: 2020-11-05

## 2020-10-28 RX ORDER — UMECLIDINIUM 62.5 UG/1
AEROSOL, POWDER ORAL
COMMUNITY
Start: 2018-03-16 | End: 2020-11-05

## 2020-10-28 RX ORDER — PEN NEEDLE, DIABETIC 31 GX3/16"
NEEDLE, DISPOSABLE MISCELLANEOUS
COMMUNITY
Start: 2018-04-04

## 2020-10-28 RX ORDER — LEVOFLOXACIN 750 MG/1
TABLET ORAL
COMMUNITY
Start: 2020-08-28 | End: 2020-11-05 | Stop reason: ALTCHOICE

## 2020-10-28 RX ORDER — METFORMIN HYDROCHLORIDE 500 MG/1
TABLET ORAL
COMMUNITY
Start: 2016-08-03 | End: 2020-11-05

## 2020-10-29 ENCOUNTER — HOSPITAL ENCOUNTER (OUTPATIENT)
Dept: INFUSION THERAPY | Age: 60
Discharge: HOME | End: 2020-10-29
Payer: MEDICARE

## 2020-10-29 VITALS
DIASTOLIC BLOOD PRESSURE: 86 MMHG | HEART RATE: 89 BPM | TEMPERATURE: 97.1 F | RESPIRATION RATE: 18 BRPM | OXYGEN SATURATION: 95 % | SYSTOLIC BLOOD PRESSURE: 160 MMHG

## 2020-10-29 DIAGNOSIS — J45.51 SEVERE PERSISTENT ASTHMA WITH EXACERBATION: Primary | ICD-10-CM

## 2020-10-29 PROCEDURE — 96372 THER/PROPH/DIAG INJ SC/IM: CPT

## 2020-10-29 PROCEDURE — 74011250636 HC RX REV CODE- 250/636: Performed by: INTERNAL MEDICINE

## 2020-10-29 RX ADMIN — BENRALIZUMAB 30 MG: 30 INJECTION, SOLUTION SUBCUTANEOUS at 10:40

## 2020-10-29 NOTE — PROGRESS NOTES
[facility] Progress Note    Date: 2020    Name: Dario Barnett    MRN: [id number]         : 1960    Rashida Dong 2/3    Ms. Amauri Fuentes arrived to [facility] at 56. Ms. Amauri Fuentes was assessed and education was provided. Ms. Chandana León vitals were reviewed. Visit Vitals  BP (!) 160/86 (BP 1 Location: Left arm, BP Patient Position: Sitting)   Pulse 89   Temp 97.1 °F (36.2 °C)   Resp 18   SpO2 95%       Fasenra 30 mg was administered as ordered SQ in patient's right upper arm. Ms. Amauri Fuentes tolerated well without complaints. Patient Vitals for the past 4 hrs:   Temp Pulse Resp BP SpO2   10/29/20 1035 97.1 °F (36.2 °C) 89 18 (!) 160/86 95 %       Ms. Amauri Fuentes was discharged from [place] [address] in stable condition at 1045. She is to return on 20 at 56 for her next appointment.     Lam Giraldo RN  2020

## 2020-11-02 ENCOUNTER — HOSPITAL ENCOUNTER (OUTPATIENT)
Dept: BONE DENSITY | Age: 60
Discharge: HOME | End: 2020-11-02
Attending: INTERNAL MEDICINE
Payer: MEDICARE

## 2020-11-02 ENCOUNTER — HOSPITAL ENCOUNTER (OUTPATIENT)
Dept: CT IMAGING | Age: 60
Discharge: HOME | End: 2020-11-02
Attending: INTERNAL MEDICINE
Payer: MEDICARE

## 2020-11-02 DIAGNOSIS — R06.02 SHORTNESS OF BREATH: ICD-10-CM

## 2020-11-02 DIAGNOSIS — G47.33 OSA (OBSTRUCTIVE SLEEP APNEA): ICD-10-CM

## 2020-11-02 DIAGNOSIS — J96.11 CHRONIC RESPIRATORY FAILURE WITH HYPOXIA AND HYPERCAPNIA (HCC): ICD-10-CM

## 2020-11-02 DIAGNOSIS — E66.01 MORBID OBESITY (HCC): ICD-10-CM

## 2020-11-02 DIAGNOSIS — J45.51 POORLY CONTROLLED SEVERE PERSISTENT ASTHMA WITH ACUTE EXACERBATION: ICD-10-CM

## 2020-11-02 DIAGNOSIS — J96.12 CHRONIC RESPIRATORY FAILURE WITH HYPOXIA AND HYPERCAPNIA (HCC): ICD-10-CM

## 2020-11-02 DIAGNOSIS — E66.2 OBESITY HYPOVENTILATION SYNDROME (HCC): ICD-10-CM

## 2020-11-02 DIAGNOSIS — Z87.891 PERSONAL HISTORY OF TOBACCO USE, PRESENTING HAZARDS TO HEALTH: ICD-10-CM

## 2020-11-02 DIAGNOSIS — J44.9 COPD, GROUP D, BY GOLD 2017 CLASSIFICATION (HCC): ICD-10-CM

## 2020-11-02 DIAGNOSIS — R06.09 DYSPNEA ON EXERTION: ICD-10-CM

## 2020-11-02 DIAGNOSIS — J31.0 CHRONIC RHINITIS: ICD-10-CM

## 2020-11-02 DIAGNOSIS — J44.9 ASTHMA-COPD OVERLAP SYNDROME (HCC): ICD-10-CM

## 2020-11-02 DIAGNOSIS — Z79.52 CURRENT CHRONIC USE OF SYSTEMIC STEROIDS: ICD-10-CM

## 2020-11-02 PROCEDURE — G0297 LDCT FOR LUNG CA SCREEN: HCPCS

## 2020-11-02 PROCEDURE — 77080 DXA BONE DENSITY AXIAL: CPT

## 2020-11-05 ENCOUNTER — OFFICE VISIT (OUTPATIENT)
Dept: PULMONOLOGY | Age: 60
End: 2020-11-05
Payer: MEDICARE

## 2020-11-05 ENCOUNTER — TELEPHONE (OUTPATIENT)
Dept: PULMONOLOGY | Age: 60
End: 2020-11-05

## 2020-11-05 VITALS
TEMPERATURE: 98.6 F | RESPIRATION RATE: 20 BRPM | BODY MASS INDEX: 47.84 KG/M2 | HEART RATE: 83 BPM | HEIGHT: 63 IN | DIASTOLIC BLOOD PRESSURE: 81 MMHG | SYSTOLIC BLOOD PRESSURE: 142 MMHG | OXYGEN SATURATION: 94 % | WEIGHT: 270 LBS

## 2020-11-05 DIAGNOSIS — Z79.52 CURRENT CHRONIC USE OF SYSTEMIC STEROIDS: ICD-10-CM

## 2020-11-05 DIAGNOSIS — M81.8 OTHER OSTEOPOROSIS WITHOUT CURRENT PATHOLOGICAL FRACTURE: ICD-10-CM

## 2020-11-05 DIAGNOSIS — J44.9 COPD, GROUP D, BY GOLD 2017 CLASSIFICATION (HCC): ICD-10-CM

## 2020-11-05 DIAGNOSIS — R06.09 DYSPNEA ON EXERTION: ICD-10-CM

## 2020-11-05 DIAGNOSIS — I50.32 CHRONIC DIASTOLIC CONGESTIVE HEART FAILURE (HCC): ICD-10-CM

## 2020-11-05 DIAGNOSIS — I27.20 PULMONARY HYPERTENSION (HCC): ICD-10-CM

## 2020-11-05 DIAGNOSIS — Z87.891 PERSONAL HISTORY OF TOBACCO USE, PRESENTING HAZARDS TO HEALTH: ICD-10-CM

## 2020-11-05 DIAGNOSIS — E66.01 MORBID OBESITY (HCC): ICD-10-CM

## 2020-11-05 DIAGNOSIS — J45.51 SEVERE PERSISTENT ASTHMA WITH EXACERBATION: Primary | ICD-10-CM

## 2020-11-05 PROCEDURE — G8510 SCR DEP NEG, NO PLAN REQD: HCPCS | Performed by: INTERNAL MEDICINE

## 2020-11-05 PROCEDURE — G8754 DIAS BP LESS 90: HCPCS | Performed by: INTERNAL MEDICINE

## 2020-11-05 PROCEDURE — G8753 SYS BP > OR = 140: HCPCS | Performed by: INTERNAL MEDICINE

## 2020-11-05 PROCEDURE — 99215 OFFICE O/P EST HI 40 MIN: CPT | Performed by: INTERNAL MEDICINE

## 2020-11-05 PROCEDURE — G8427 DOCREV CUR MEDS BY ELIG CLIN: HCPCS | Performed by: INTERNAL MEDICINE

## 2020-11-05 PROCEDURE — G8417 CALC BMI ABV UP PARAM F/U: HCPCS | Performed by: INTERNAL MEDICINE

## 2020-11-05 PROCEDURE — 3017F COLORECTAL CA SCREEN DOC REV: CPT | Performed by: INTERNAL MEDICINE

## 2020-11-05 PROCEDURE — G9899 SCRN MAM PERF RSLTS DOC: HCPCS | Performed by: INTERNAL MEDICINE

## 2020-11-05 RX ORDER — IPRATROPIUM BROMIDE AND ALBUTEROL SULFATE 2.5; .5 MG/3ML; MG/3ML
3 SOLUTION RESPIRATORY (INHALATION)
COMMUNITY
Start: 2022-09-23 | End: 2022-09-23

## 2020-11-05 RX ORDER — PREDNISONE 20 MG/1
40 TABLET ORAL
Qty: 10 TAB | Refills: 0 | Status: SHIPPED | OUTPATIENT
Start: 2020-11-05 | End: 2020-11-10

## 2020-11-05 RX ORDER — AZITHROMYCIN 250 MG/1
250 TABLET, FILM COATED ORAL
Qty: 30 TAB | Refills: 2 | Status: SHIPPED | OUTPATIENT
Start: 2020-11-06 | End: 2020-11-06 | Stop reason: SDUPTHER

## 2020-11-05 NOTE — PROGRESS NOTES
[address] [place] [address]  [phone]    [facility]  Pulmonary, Critical Care, and Sleep Medicine    Pulmonary Office F/U  Name: Callie Hutton 63 y.o. female  MRN: [id number]  : 1960  Service Date: 20  Chief Complaint:   Chief Complaint   Patient presents with    Asthma     follow up from 2020    COPD    Breathing Problem     respiratory failure, CHAUDHARY, SOB, obesity hypoventilation syndrome    Other     history of tobacco use    Results     CT & bone density study 2020       History of Present Illness:  Callie Hutton is a 63 y.o. female, who presents to Pulmonary clinic for F/U of asthma/COPD. Pt last seen by virtual visit on 20. In the interval, pt reports increased dyspnea for the last few days -- due to change of weather. Pt reports she is on prednisone 5mg daily. PRN albuterol - using morning and night. Last few days using about 3-4x per day  Using HFA 3x per day  Increased nasal drainage  Patient reports she is compliant with Advair HFA, Spiriva, Flovent and Singulair. Patient reports dyspnea with minimal exertion. Patient reports continuous use supplemental oxygen, 2 L with exertion and nightly. Patient reports dyspnea only mildly improved with PRN albuterol and with exertion. Symptoms worsened by weather, allergic triggers, exertion, dyspnea with ADLs, mMRC of 4. No other modifying factors. Patient denies fevers, chills, night sweats, nausea, vomiting, diarrhea, hemoptysis, angina. Pt receiving Fasenra injections -- 3 so far.   Of note, patient reports she quit smoking in     Past Medical History:   Diagnosis Date    Asthma     Chronic lung disease     COPD     Cystocele, midline     Diabetes mellitus ([facility] [address]     GERD (gastroesophageal reflux disease)     Hidradenitis suppurativa     Hyperlipidemia     Hypertension     SHERRIE on CPAP     CPAP    Stress incontinence      Past Surgical History:   Procedure Laterality Date    BREAST SURGERY PROCEDURE UNLISTED      Right breast benign tumor removal    HX APPENDECTOMY      HX CHOLECYSTECTOMY      HX DILATION AND CURETTAGE      Dysfunctional uterine bleeding, thought 2/2 fibroids    HX TUBAL LIGATION       Family History   Problem Relation Age of Onset    Hypertension Mother     Stroke Mother     Breast Cancer Mother         Bilateral mastectomies    Cancer Mother         ovarian and breast    Heart Failure Mother     Ovarian Cancer Mother     Heart Attack Father         2011    Heart Surgery Father         CABG    Heart Failure Father     COPD Sister         Heavy smoker    Cancer Sister         ovarian    Heart Failure Sister     Lung Disease Sister     Asthma Child     Cancer Maternal Aunt         pancreatic    Cancer Maternal Grandfather         stomach     Social History     Socioeconomic History    Marital status: LEGALLY      Spouse name: Not on file    Number of children: Not on file    Years of education: Not on file    Highest education level: Not on file   Occupational History    Not on file   Social Needs    Financial resource strain: Not on file    Food insecurity     Worry: Not on file     Inability: Not on file    Transportation needs     Medical: Not on file     Non-medical: Not on file   Tobacco Use    Smoking status: Former Smoker     Packs/day: 1.00     Years: 30.00     Pack years: 30.00     Types: Cigarettes     Start date: 1966     Last attempt to quit: 2006     Years since quittin.1    Smokeless tobacco: Former User    Tobacco comment: 1-1.5 packs per day   Substance and Sexual Activity    Alcohol use: No    Drug use: No    Sexual activity: Not Currently   Lifestyle    Physical activity     Days per week: Not on file     Minutes per session: Not on file    Stress: Not on file   Relationships    Social connections     Talks on phone: Not on file     Gets together: Not on file     Attends Yarsanism service: Not on file     Active member of club or organization: Not on file     Attends meetings of clubs or organizations: Not on file     Relationship status: Not on file    Intimate partner violence     Fear of current or ex partner: Not on file     Emotionally abused: Not on file     Physically abused: Not on file     Forced sexual activity: Not on file   Other Topics Concern    Not on file   Social History Narrative    Not on file     Allergies   Allergen Reactions    Ancef [Cefazolin] Hives    Contrast Agent [Iodine] Anaphylaxis, Shortness of Breath and Swelling     Needs pre-medication for IV contrast with Benadryl, Solu-Medrol    Fish Containing Products Anaphylaxis     Pt states she had a severe allergic reaction at 9 y/o.  Statins-Hmg-Coa Reductase Inhibitors Myalgia    Metformin Other (comments)     Abdominal pain, diarrhea.  Codeine Other (comments)     Altered mental status     Prior to Admission medications    Medication Sig Start Date End Date Taking? Authorizing Provider   acetylcysteine (NAC) 600 mg cap capsule Take  by mouth. 12/30/15   Provider, Historical   aspirin-calcium carbonate 81 mg-300 mg calcium(777 mg) tab Take  by mouth. Provider, Historical   atorvastatin (LIPITOR) 40 mg tablet Take  by mouth. 4/18/13   Provider, Historical   glucose blood VI test strips (FreeStyle Lite Strips) strip Use four times daily for blood sugar checks. 6/16/17   Provider, Historical   Blood-Glucose Meter (FreeStyle Freedom Lite) monitoring kit CHECK BLOOD SUGARS B.I.D. E11.9 6/16/17   Provider, Historical   budesonide (PULMICORT) 0.5 mg/2 mL nbsp Take  by inhalation. 5/10/16   Provider, Historical   erythromycin (ILOTYCIN) ophthalmic ointment Apply  to eye. 3/21/16   Provider, Historical   famotidine (PEPCID) 20 mg tablet Take  by mouth. 9/23/16   Provider, Historical   fluticasone propionate (FLONASE) 50 mcg/actuation nasal spray by Nasal route. 8/26/10   Provider, Historical   glipiZIDE (GLUCOTROL) 10 mg tablet Take  by mouth. Provider, Historical   guaiFENesin ER (Mucinex) 600 mg ER tablet Take  by mouth. 9/1/15   Provider, Historical   insulin lispro (HumaLOG U-100 Insulin) 100 unit/mL injection by SubCUTAneous route. 2/14/17   Provider, Historical   ipratropium (ATROVENT) 0.02 % soln Take  by inhalation. 5/22/15   Provider, Historical   lancets (FreeStyle Lancets) 28 gauge misc Use four times daily for blood sugar checks 6/16/17   Provider, Historical   lansoprazole (PREVACID) 30 mg capsule Take  by mouth. 6/30/15   Provider, Historical   metFORMIN (GLUCOPHAGE) 500 mg tablet Take  by mouth. 8/3/16   Provider, Historical   oxyCODONE-acetaminophen (PERCOCET) 5-325 mg per tablet  8/2/20   Provider, Historical   Insulin Needles, Disposable, (BD Ultra-Fine Mini Pen Needle) 31 gauge x 3/16\" ndle Use with Basaglar once daily. 4/4/18   Provider, Historical   Insulin Syringe-Needle U-100 0.3 mL 31 gauge x 5/16\" syrg USE AS DIRECTED 2/14/17   Provider, Historical   umeclidinium (Incruse Ellipta) 62.5 mcg/actuation inhaler Take  by inhalation. 3/16/18   Provider, Historical   predniSONE (DELTASONE) 5 mg tablet Take 5 mg by mouth daily. Provider, Historical   fluticasone propion-salmeteroL (ADVAIR HFA) 230-21 mcg/actuation inhaler Take 2 Puffs by inhalation two (2) times a day. 8/14/20   Verner Blanks, MD   ipratropium-albuteroL (COMBIVENT RESPIMAT)  mcg/actuation inhaler Take 1 Puff by inhalation every four (4) hours as needed for Wheezing. 8/12/20   Beth Tovar MD   acetaminophen (TYLENOL) 500 mg tablet Take 500 mg by mouth every six (6) hours as needed for Fever or Pain. Provider, Historical   albuterol (PROVENTIL HFA, VENTOLIN HFA, PROAIR HFA) 90 mcg/actuation inhaler Take 2 Puffs by inhalation every four (4) hours as needed for Wheezing.  7/7/20   Evonne Kate, YARON   azithromycin [facility]) 250 mg tablet Take 1 Tab by mouth every Monday, Wednesday, Friday. Monday-Friday. 7/3/20   Marcella Tomas MD   omeprazole (PRILOSEC) 40 mg capsule Take 40 mg by mouth daily. 3/9/18   Provider, Historical   insulin glargine (Basaglar KwikPen U-100 Insulin) 100 unit/mL (3 mL) inpn 45 Units by SubCUTAneous route nightly. Provider, Historical   furosemide (Lasix) 20 mg tablet Take 40 mg by mouth daily. Provider, Historical   lactobacillus sp. 50 billion cpu (BIO-K PLUS) 50 billion cell -375 mg cap capsule Take 1 Cap by mouth daily. 3/10/20   Carol Davis MD   simethicone (GAS-X) 125 mg capsule Take 125 mg by mouth four (4) times daily as needed for Flatulence. Provider, Historical   loratadine (CLARITIN) 10 mg tablet Take 10 mg by mouth daily as needed for Allergies. Provider, Historical   lisinopril (PRINIVIL, ZESTRIL) 20 mg tablet Take 20 mg by mouth two (2) times a day. Provider, Historical   hydroCHLOROthiazide (HYDRODIURIL) 12.5 mg tablet Take 12.5 mg by mouth daily. Provider, Historical   tiotropium bromide (SPIRIVA RESPIMAT) 2.5 mcg/actuation inhaler Take 2 Puffs by inhalation daily. Provider, Historical   NOVOLOG FLEXPEN U-100 INSULIN 100 unit/mL inpn Continue home Sliding scale insulin as prior to admission  Patient taking differently: 1 Units by SubCUTAneous route three (3) times daily. If BG <100=0u  101-150=5u  151-250=8u  251-300=12u  >300 call MD   7/10/18   Joe Redmond MD   OXYGEN-AIR DELIVERY SYSTEMS 2 L by Nasal route continuous. [facility]    Provider, Historical   aspirin delayed-release 81 mg tablet Take 81 mg by mouth daily. Provider, Historical   montelukast (SINGULAIR) 10 mg tablet Take 10 mg by mouth nightly.     Other, MD Lauren     Immunization History   Administered Date(s) Administered    Influenza Vaccine 09/21/2009, 10/21/2011, 12/21/2012, 01/07/2014, 10/20/2014, 10/01/2015, 10/07/2016, 12/31/2019    Influenza Vaccine [employer] PF (>6 Mo Flulaval, Fluarix, and >3 Yrs [address] [place] [id number]) 01/26/2018, 10/11/2018    Novel Influenza-H1N1-09, All Formulations 01/29/2010    Pneumococcal Conjugate (PCV-13) 12/31/2019    Pneumococcal Polysaccharide (PPSV-23) 10/11/2018       Review of Systems:  A complete review of systems was performed as stated in the HPI, all others are negative. Objective:    Physical Exam:  BP (!) 142/81 (BP 1 Location: Right arm, BP Patient Position: Sitting)   Pulse 83   Temp 98.6 °F (37 °C) (Oral)   Resp 20   Ht 5' 3\" (1.6 m)   Wt 122.5 kg (270 lb)   SpO2 94% Comment: 2 LPM O2 (pulse)  BMI 47.83 kg/m²   Vitals were personally reviewed  Gen: no acute distress, pleasant and cooperative, sitting up in chair, wearing supplemental oxygen, ambulates slowly  HEENT: normocephalic/atraumatic, PERRLA, EOMI, no scleral icterus, no oral lesions, fair dentition, Mallampati IV  Neck: supple, trachea midline, no JVD, no cervical and supraclavicular adenopathy  Chest: no lesions, with even rise and fall, no pectus excavatum or flail chest  CVS: regular rate rhythm, S1/S2, no murmurs/rubs/gallops  Lungs: Poor air entry B/L, CTABL, no wheezes/rales/rhonchi  Back: no kyphosis or scoliosis  Abdomen: Obese, soft, nontender, bowel sounds present, no hepatosplenomegaly  Ext: no pitting edema B/L, no peripheral cyanosis or clubbing  Neuro: grossly normal, AAOx3, normal strength and coordination grossly, no focal deficits  Skin: no rashes, erythema, lesions  Psych: normal memory, thought content, and processing    Labs:   I have reviewed the patient's available labs  Lab Results   Component Value Date/Time    WBC 8.1 08/31/2020 06:08 PM    HGB 12.7 08/31/2020 06:08 PM    HCT 38.6 08/31/2020 06:08 PM    PLATELET 180 61/04/0871 06:08 PM    MCV 92.8 08/31/2020 06:08 PM     Lab Results   Component Value Date/Time    Sodium 137 08/31/2020 06:08 PM    Potassium 4.5 08/31/2020 06:08 PM    Chloride 103 08/31/2020 06:08 PM    CO2 31 08/31/2020 06:08 PM    Anion gap 3 08/31/2020 06:08 PM    Glucose 261 (H) 08/31/2020 06:08 PM    BUN 13 08/31/2020 06:08 PM    Creatinine 0.98 08/31/2020 06:08 PM    BUN/Creatinine ratio 13 08/31/2020 06:08 PM    GFR est AA >60 08/31/2020 06:08 PM    GFR est non-AA 58 (L) 08/31/2020 06:08 PM    Calcium 9.6 08/31/2020 06:08 PM    Bilirubin, total 0.7 08/31/2020 06:08 PM    Alk. phosphatase 86 08/31/2020 06:08 PM    Protein, total 7.7 08/31/2020 06:08 PM    Albumin 3.4 08/31/2020 06:08 PM    Globulin 4.3 (H) 08/31/2020 06:08 PM    A-G Ratio 0.8 08/31/2020 06:08 PM    ALT (SGPT) 59 (H) 08/31/2020 06:08 PM    AST (SGOT) 20 08/31/2020 06:08 PM     Imaging:  I have personally reviewed patient's imaging --CT lung cancer screening from 11/2/2020 shows bilateral scattered mild centrilobular emphysematous changes in bilateral upper lobes and middle lobes along with some bronchial wall thickening in the lower lobes along with linear atelectasis in the right middle lobe and left upper lobe laterally. No infiltrates, consolidations, masses, effusions seen. Official report per radiology:  CT Results (most recent):  Results from Hospital Encounter encounter on 11/02/20   CT LOW DOSE LUNG CANCER SCREENING    Narrative CT CHEST LUNG SCREENING    INDICATION: Lung CT screening study requested as patient needs established age  criteria, smoking history requirements, or additional risk factor eligibility  requirements. CT screening study requested as patient meets established  criteria. 48 pack year smoking history. COPD. Technique: Low-dose computed tomography acquisition performed according to the  national comprehensive cancer network guidelines space on body mass index. Axial  raw images obtained. Reconstruction on lung windows and soft tissue windows with  additional coronal and sagittally reformatted series.  No intravenous contrast  administered for this exam.    COMPARISON: CT abdomen/pelvis 8/31/2020, CT chest 2/21/2018    FINDINGS:  Lungs: Right middle lobe atelectasis is similar to 8/31/2020, progressed from  2018. Subsegmental atelectasis in the lingula. Mild bronchial wall thickening. 3  mm right upper lobe nodule (4, 76), not definitely seen on 2018 study. Pleura: No effusion. Lymph Nodes: No lymphadenopathy. Cardiovascular: Moderate calcified atherosclerotic disease, including the  coronary arteries. Bones: Mild degenerative changes in the spine. No suspicious osseous lesions. No  acute osseous abnormality. Visualized upper abdomen is unremarkable. Impression IMPRESSION:    1. Right upper lobe pulmonary nodule measuring 3 mm, not definitely seen on 2018  study. Lung-RADS 2 - Benign appearance or behavior. Management: Continue annual screening with low dose CT in 12 months. 2. Right middle lobe atelectasis is similar to 8/31/2020, progressed from 2018. 3. Mild bronchial wall thickening, likely reflecting history of COPD. DEXA Results (most recent):  Results from Hospital Encounter encounter on 11/02/20   DEXA BONE DENSITY STUDY AXIAL    Narrative DEXA BONE DENSITOMETRY, CENTRAL    CLINICAL INDICATION/HISTORY: Postmenopausal. History of osteopenia. Chronic  intermittent steroid use. Prior Fosamax use. CLINICAL INDICATION/HISTORY: Using GE LUNAR Prodigy densitometer, bone density  measurement was performed in the lumbar spine in addition to the proximal left  and right femora and the left forearm. T score refers to standard deviations  above or below average compared to a young adult of the same sex. Z score  refers to standard deviations above or below average compared to a patient of  the same sex, age, race and weight. COMPARISON: The patient's prior studies are no longer available on the bone  densitometry unit for comparison trending due to a prior computer issue on the  densitometry unit. The data from the prior study of March 12, 2013 has been  included on this study.     FINDINGS:     Lumbar Spine Levels: L1-L4  Mean Bone Mineral Density (BMD): 0.862 g/cm2  (0.891 BMD on previous study)  T Score: -2.7     (-2.4 T score on previous)  Z Score: -2.6    Left distal 1/3 Radius BMD:  0.900 g/cm2  (left forearm was not measured on  previous study)  T Score: 0.1       Z Score: 1.2    Left Total Proximal Femur BMD: 0.951  (1.034 BMD on previous study)  T Score: -0.4     (0.2 T score on previous)  Z Score: -0.3    Right Total Proximal Femur BMD: 0.878  (1.015 BMD on previous study)  T Score: -1.0     (0.1 T score on previous)  Z Score: -0.9    Left Femoral Neck BMD:  0.842 g/cm2    T Score: -1.4  Z Score: -0.9    Right Femoral Neck BMD:  0.834 g/cm2    T Score: -1.5  Z Score: -0.9       Impression IMPRESSION:    1. BMD measures consistent with osteoporosis. 2.  This will serve as the new baseline study at this institution. Based upon current ISCD guidelines, the patient's overall diagnostic category,  selected using WHO criteria in postmenopausal women and males aged 48 and above,  is selected based upon the lowest T score from among the lumbar spine, total  femur, femoral neck, (or distal third radius if measured). WHO Definition of Osteoporosis and Osteopenia on DXA (specified for post  menopausal  females):      Normal:                     T Score at or above -1 SD    Osteopenia:               T Score between -1 and -2.5 SD    Osteoporosis:             T Score at or below -2.5 SD    The risk of fracture approximately doubles for each 1 SD decrease in T score. It is important to consider other factors in assessing a patient's risk of  fracture, including age, risk of falling/injury, history of fragility fracture,  family history of osteoporosis, smoking, low weight. Various fracture risk tools have been developed for adult patients and are  available online. For example, the FRAX tool developed by [facility] is widely used. Reference www.iscd.org.     It is also important to note that DXA measures bone density but does not  distinguish among causes of decreased bone density, which include primary vs.  secondary osteoporosis (such as metabolic bone disorders or possible effects of  medications) and also other conditions (such as osteomalacia). Clinical  considerations should determine what additional evaluation may be warranted to  exclude secondary conditions in a patient with low bone density. It is  suggested that secondary causes of low bone density be considered in patients  with Z score lower than -2.0, and patients who are not responding to therapy for  osteoporosis on followup DXA despite compliance with medications. Please note that reliable, valid comparisons can not be made between studies  which have been performed on different densitometers. If clinically warranted,  follow up study performed at this site would best permit assessment of trend for  possible change in bone mineral density over time in comparison to this study. Thank you for this referral.           PFTs:  I have reviewed the patient's PFTs -- no new studies    TTE:  I have reviewed the patient's TTE results  05/21/20   ECHO ADULT COMPLETE 05/25/2020 5/25/2020    Narrative · Image quality for this study was technically difficult. Contrast used:   DEFINITY. · Normal cavity size and systolic function (ejection fraction normal). Moderate concentric hypertrophy. Estimated left ventricular ejection   fraction is 65 - 70%. Visually measured ejection fraction. No regional   wall motion abnormality noted. Moderate (grade 2) left ventricular   diastolic dysfunction. · Mildly dilated left atrium. · Pulmonary hypertension. Pulmonary arterial systolic pressure is 61 mmHg. · Severely elevated central venous pressure (15+ mmHg); IVC diameter is   larger than 21 mm and collapses less than 50% with respiration. Signed by: Franchesca Gao MD       Assessment and Plan:  63 y.o. female with:    Impression:  1.  COPD/asthma overlap syndrome, with mild exacerbation: No hospitalizations in the interval.  Patient remains on chronic prednisone  2. Underlying poorly controlled severe persistent asthma with steroid dependence: Patient has a strong asthma component with significant bronchospasm to a wide variety of classic environmental triggers, was on SCIT therapy in the past.  Despite chronic steroids, patient's absolute eosinophil count is 100 on 6/3/2020 and 200 on 3/24/2020. Patient on benralizumab injections, received 3 so far, most recently last week  3. COPD, gold risk category D  4. Chronic hypoxic and hypercapnic respiratory failure  5. Morbid obesity: Body mass index is 47.83 kg/m². 6.  Obesity hypoventilation syndrome with SHERRIE: [facility]  7. Dyspnea/shortness of breath: Multifactorial, due to above  8. Chronic rhinitis  9. Chronic steroid dependence: Noted above  10. CHFpEF: Seen on transthoracic echo, grade 2 diastolic dysfunction and moderate concentric hypertrophy with dilated LA and severe pulmonary hypertension  11. Pulm HTN: Secondary to WHO group 2 and group 3 disease. Estimated PASP of 61 mmHg  12. History of tobacco use: Quit smoking in 2006  13. Osteoporosis: Seen on most recent DEXA scan from 11/2/2020. Secondary to chronic steroid use      Plan:  -Prescribed short course of prednisone 40 mg once daily and then drop back down to 5 mg daily  -Advised patient to follow-up with PCP regarding DEXA scan showing osteoporosis. Advised patient that she should consider Prolia injections versus IV bisphosphonates  -Refer patient to cardiology for further management of CHFpEF and group 2 pulmonary hypertension  -Reviewed CT lung cancer screening reporting lung RADS-2, repeat next year which would complete 15 years post smoking cessation  -Continue chronic azithromycin therapy 250 mg p.o. every Monday/Wednesday/Friday  -Continue dual ICS/LABA/LAMA/LTRA therapy with:   Continue Advair /21 MCG 2 puffs twice daily. Counseled patient rinse mouth thoroughly after use   Continue Flovent  mcg 1 puff twice daily. Counseled patient to rinse mouth well after each use   Continue Spiriva Respimat 2 puffs daily   Continue Singulair 10 mg nightly   Continue DuoNebs as needed   Continue Albuterol HFA 1-2puffs q4-6h PRN. Counseled patient that this is their rescue inhaler. Also counseled patient regarding premedication 15-30m prior to exercise or exposure to very cold air  -Counseled patient on proper inhaler technique  -Counseled patient to avoid potential triggers of asthma  -Counseled regarding weight loss  -Counseled regarding deleterious health effects of chronic steroid therapy. Counseled patient to follow-up with ophthalmology as scheduled.  -Continue Claritin daily  -Continue trilogy nightly and PRN during daytime. Counseled patient to change mask/tubing/filters every 3 to 6 months. Counseled patient against sleepy driving.  -Continue supplemental oxygen 2 L with exertion and blended into AVAPS nightly  -Immunizations reviewed, influenza and pneumococcal vaccinations up-to-date    Follow-up and Dispositions    · Return in about 2 months (around 1/5/2021).        Orders Placed This Encounter    CT LOW DOSE LUNG CANCER SCREENING    REFERRAL TO CARDIOLOGY    albuterol-ipratropium (DUO-NEB) 2.5 mg-0.5 mg/3 ml nebu    predniSONE (DELTASONE) 20 mg tablet    DISCONTD: azithromycin (ZITHROMAX) 250 mg tablet         Jaylene Christensen MD/MPH     Pulmonary, Critical Care Medicine  [facility]

## 2020-11-05 NOTE — TELEPHONE ENCOUNTER
Savanah from [facility] called([phone]). Needs clarification for Zithromax prescription. Please call back.

## 2020-11-05 NOTE — PROGRESS NOTES
Lorraine Olszewski presents today for   Chief Complaint   Patient presents with    Asthma     follow up from 8/14/2020    COPD    Breathing Problem     respiratory failure, CHAUDHARY, SOB, obesity hypoventilation syndrome    Other     history of tobacco use    Results     CT & bone density study 11/2/2020       Is someone accompanying this pt? No    Is the patient using any DME equipment during OV? Yes. [employer]    [employer]    Depression Screening:  3 most recent PHQ Screens 11/5/2020   Little interest or pleasure in doing things Not at all   Feeling down, depressed, irritable, or hopeless Not at all   Total Score PHQ 2 0       Learning Assessment:  Learning Assessment 4/9/2018   PRIMARY LEARNER Patient   HIGHEST LEVEL OF EDUCATION - PRIMARY LEARNER  SOME COLLEGE   BARRIERS PRIMARY LEARNER -   PRIMARY LANGUAGE ENGLISH   LEARNER PREFERENCE PRIMARY READING   ANSWERED BY patient Benito DEE SELF       Abuse Screening:  No flowsheet data found. Fall Risk  Fall Risk Assessment, last 12 mths 2/6/2018   Able to walk? Yes   Fall in past 12 months? No         Coordination of Care:  1. Have you been to the ER, urgent care clinic since your last visit? Hospitalized since your last visit? No    2. Have you seen or consulted any other health care providers outside of the [address] [place] [address] since your last visit? Include any pap smears or colon screening. Yes. Dr. Minerva Bright, PCP    Influenza vaccine offered but deferred at this time. Will get from [address] tomorrow per patient.

## 2020-11-05 NOTE — LETTER
11/9/20 Patient: Dario Barnett YOB: 1960 Date of Visit: 11/5/2020 Chad Jimenez MD 
[address] [place] [address] [address] [place] [address] VIA In Basket Dear Chad Jimenez MD, Thank you for referring Ms. Nia Hernandes to [address] [place] [address] for evaluation. My notes for this consultation are attached. If you have questions, please do not hesitate to call me. I look forward to following your patient along with you. Sincerely, Joseph Rodriguez MD

## 2020-11-06 RX ORDER — AZITHROMYCIN 250 MG/1
250 TABLET, FILM COATED ORAL
Qty: 30 TAB | Refills: 2 | COMMUNITY
Start: 2020-11-06 | End: 2021-04-27 | Stop reason: SDUPTHER

## 2020-11-24 ENCOUNTER — OFFICE VISIT (OUTPATIENT)
Dept: CARDIOLOGY CLINIC | Age: 60
End: 2020-11-24
Payer: MEDICARE

## 2020-11-24 VITALS
DIASTOLIC BLOOD PRESSURE: 81 MMHG | HEART RATE: 86 BPM | OXYGEN SATURATION: 97 % | TEMPERATURE: 97 F | SYSTOLIC BLOOD PRESSURE: 175 MMHG | BODY MASS INDEX: 48.55 KG/M2 | HEIGHT: 63 IN | WEIGHT: 274 LBS

## 2020-11-24 DIAGNOSIS — I50.32 DIASTOLIC CHF, CHRONIC (HCC): Primary | ICD-10-CM

## 2020-11-24 DIAGNOSIS — E11.9 TYPE 2 DIABETES MELLITUS WITHOUT COMPLICATION, UNSPECIFIED WHETHER LONG TERM INSULIN USE (HCC): ICD-10-CM

## 2020-11-24 DIAGNOSIS — J44.9 CHRONIC OBSTRUCTIVE PULMONARY DISEASE, UNSPECIFIED COPD TYPE (HCC): ICD-10-CM

## 2020-11-24 DIAGNOSIS — I10 ESSENTIAL HYPERTENSION, BENIGN: ICD-10-CM

## 2020-11-24 PROCEDURE — 3017F COLORECTAL CA SCREEN DOC REV: CPT | Performed by: INTERNAL MEDICINE

## 2020-11-24 PROCEDURE — 2022F DILAT RTA XM EVC RTNOPTHY: CPT | Performed by: INTERNAL MEDICINE

## 2020-11-24 PROCEDURE — G8754 DIAS BP LESS 90: HCPCS | Performed by: INTERNAL MEDICINE

## 2020-11-24 PROCEDURE — G8753 SYS BP > OR = 140: HCPCS | Performed by: INTERNAL MEDICINE

## 2020-11-24 PROCEDURE — G8432 DEP SCR NOT DOC, RNG: HCPCS | Performed by: INTERNAL MEDICINE

## 2020-11-24 PROCEDURE — G8428 CUR MEDS NOT DOCUMENT: HCPCS | Performed by: INTERNAL MEDICINE

## 2020-11-24 PROCEDURE — G8417 CALC BMI ABV UP PARAM F/U: HCPCS | Performed by: INTERNAL MEDICINE

## 2020-11-24 PROCEDURE — 99204 OFFICE O/P NEW MOD 45 MIN: CPT | Performed by: INTERNAL MEDICINE

## 2020-11-24 PROCEDURE — 3052F HG A1C>EQUAL 8.0%<EQUAL 9.0%: CPT | Performed by: INTERNAL MEDICINE

## 2020-11-24 PROCEDURE — G9899 SCRN MAM PERF RSLTS DOC: HCPCS | Performed by: INTERNAL MEDICINE

## 2020-11-27 ENCOUNTER — TELEPHONE (OUTPATIENT)
Dept: CARDIOLOGY CLINIC | Age: 60
End: 2020-11-27

## 2020-11-27 NOTE — TELEPHONE ENCOUNTER
Patient calling to give update since increasing lasix to twice daily. Her weight on 11/24/2020 in evening was 272 lbs. She started the increase dose 11/25. On 11/27 her weight is 267 lbs and her swelling has improved some. She states she feels better.

## 2020-11-30 NOTE — PROGRESS NOTES
HISTORY OF PRESENT ILLNESS  Brooklyn Mccracken is a 63 y.o. female. New Patient   The history is provided by the patient. This is a chronic problem. The problem occurs daily. The problem has been gradually worsening. Associated symptoms include shortness of breath. Pertinent negatives include no chest pain, no abdominal pain and no headaches. Shortness of Breath   The history is provided by the patient. This is a chronic problem. The problem occurs frequently. The problem has been gradually worsening. Associated symptoms include PND, orthopnea and leg swelling. Pertinent negatives include no fever, no headaches, no ear pain, no neck pain, no cough, no sputum production, no hemoptysis, no wheezing, no chest pain, no vomiting, no abdominal pain, no rash and no claudication. Associated medical issues include COPD. Review of Systems   Constitutional: Negative for chills, diaphoresis, fever and weight loss. HENT: Negative for ear pain and hearing loss. Eyes: Negative for blurred vision. Respiratory: Positive for shortness of breath. Negative for cough, hemoptysis, sputum production, wheezing and stridor. Cardiovascular: Positive for orthopnea, leg swelling and PND. Negative for chest pain, palpitations and claudication. Gastrointestinal: Negative for abdominal pain, heartburn, nausea and vomiting. Musculoskeletal: Negative for myalgias and neck pain. Skin: Negative for rash. Neurological: Negative for dizziness, tingling, tremors, focal weakness, loss of consciousness, weakness and headaches. Psychiatric/Behavioral: Negative for depression and suicidal ideas. Physical Exam   Constitutional: She is oriented to person, place, and time. She appears well-developed and well-nourished. HENT:   Head: Normocephalic and atraumatic. Eyes: Conjunctivae and EOM are normal. No scleral icterus. Neck: Normal range of motion. Neck supple. No JVD present.    Cardiovascular: Normal rate, regular rhythm and normal heart sounds. Exam reveals no gallop and no friction rub. No murmur heard. Pulmonary/Chest: Effort normal. No stridor. No respiratory distress. She has no wheezes. She has rales (mild diffuse rhonchi). She exhibits no tenderness. Abdominal: She exhibits no distension. There is no abdominal tenderness. Musculoskeletal: Normal range of motion. General: Edema (1+ ble edema) present. No tenderness. Lymphadenopathy:     She has no cervical adenopathy. Neurological: She is alert and oriented to person, place, and time. No cranial nerve deficit. Skin: No rash noted. No erythema. Psychiatric: She has a normal mood and affect. Her behavior is normal.     ekg sinus rhythm with non specific st-t changes  05/21/20   ECHO ADULT COMPLETE 05/25/2020 5/25/2020    Narrative · Image quality for this study was technically difficult. Contrast used:   DEFINITY. · Normal cavity size and systolic function (ejection fraction normal). Moderate concentric hypertrophy. Estimated left ventricular ejection   fraction is 65 - 70%. Visually measured ejection fraction. No regional   wall motion abnormality noted. Moderate (grade 2) left ventricular   diastolic dysfunction. · Mildly dilated left atrium. · Pulmonary hypertension. Pulmonary arterial systolic pressure is 61 mmHg. · Severely elevated central venous pressure (15+ mmHg); IVC diameter is   larger than 21 mm and collapses less than 50% with respiration. Signed by: Mechelle Vinson MD       ASSESSMENT and PLAN    ICD-10-CM [id number]    1. Diastolic CHF, chronic (HCC)  I50.32 428.32      428.0    2. Essential hypertension, benign  I10 401.1    3. Chronic obstructive pulmonary disease, unspecified COPD type ([facility]  J44.9 496    4. Type 2 diabetes mellitus without complication, unspecified whether long term insulin use (HCC)  E11.9 250.00      No orders of the defined types were placed in this encounter.     Follow-up and Dispositions · Return in about 2 weeks (around 12/8/2020). reviewed diet, exercise and weight control. Patient is a 63 yr old female with severe COPD on meds seen for worsening dyspnea-- clinical symptoms and signs consistent with superimposed diastolic CHF. Will increase lasix to 40 mg bid for 3-5 days and then change to 40 mg po daily. Meanwhile continue current meds and salt restriction. F/u in 2 weeks to reassess symptoms.

## 2020-12-03 RX ORDER — FUROSEMIDE 40 MG/1
40 TABLET ORAL 2 TIMES DAILY
Qty: 60 TAB | Refills: 6 | Status: SHIPPED | OUTPATIENT
Start: 2020-12-03 | End: 2021-10-01

## 2020-12-03 NOTE — TELEPHONE ENCOUNTER
Patient called and states that her weight today is 262 lbs and patient states that her weight is better but she still has some edema, please advise    Patient wants to know if she keeps taking 80 mg or does she start taking 40 mg every day.

## 2020-12-03 NOTE — TELEPHONE ENCOUNTER
Called and spoke with patient, advised to take Lasix 40 mg qam and 20 mg qpm, will need Rx sent to pharmacy. Patient states understanding and will call if any further questions or concerns.

## 2020-12-08 ENCOUNTER — OFFICE VISIT (OUTPATIENT)
Dept: CARDIOLOGY CLINIC | Age: 60
End: 2020-12-08
Payer: MEDICARE

## 2020-12-08 VITALS
HEART RATE: 94 BPM | WEIGHT: 267.6 LBS | DIASTOLIC BLOOD PRESSURE: 68 MMHG | HEIGHT: 63 IN | TEMPERATURE: 97.1 F | BODY MASS INDEX: 47.41 KG/M2 | SYSTOLIC BLOOD PRESSURE: 131 MMHG | OXYGEN SATURATION: 94 %

## 2020-12-08 DIAGNOSIS — I50.32 DIASTOLIC CHF, CHRONIC (HCC): Primary | ICD-10-CM

## 2020-12-08 DIAGNOSIS — I10 ESSENTIAL HYPERTENSION, BENIGN: ICD-10-CM

## 2020-12-08 DIAGNOSIS — J44.9 CHRONIC OBSTRUCTIVE PULMONARY DISEASE, UNSPECIFIED COPD TYPE (HCC): ICD-10-CM

## 2020-12-08 DIAGNOSIS — E11.9 TYPE 2 DIABETES MELLITUS WITHOUT COMPLICATION, UNSPECIFIED WHETHER LONG TERM INSULIN USE (HCC): ICD-10-CM

## 2020-12-08 DIAGNOSIS — I27.20 PULMONARY HTN (HCC): ICD-10-CM

## 2020-12-08 PROCEDURE — G8417 CALC BMI ABV UP PARAM F/U: HCPCS | Performed by: INTERNAL MEDICINE

## 2020-12-08 PROCEDURE — 3052F HG A1C>EQUAL 8.0%<EQUAL 9.0%: CPT | Performed by: INTERNAL MEDICINE

## 2020-12-08 PROCEDURE — 2022F DILAT RTA XM EVC RTNOPTHY: CPT | Performed by: INTERNAL MEDICINE

## 2020-12-08 PROCEDURE — G8754 DIAS BP LESS 90: HCPCS | Performed by: INTERNAL MEDICINE

## 2020-12-08 PROCEDURE — G8752 SYS BP LESS 140: HCPCS | Performed by: INTERNAL MEDICINE

## 2020-12-08 PROCEDURE — G9899 SCRN MAM PERF RSLTS DOC: HCPCS | Performed by: INTERNAL MEDICINE

## 2020-12-08 PROCEDURE — 3017F COLORECTAL CA SCREEN DOC REV: CPT | Performed by: INTERNAL MEDICINE

## 2020-12-08 PROCEDURE — 99214 OFFICE O/P EST MOD 30 MIN: CPT | Performed by: INTERNAL MEDICINE

## 2020-12-08 PROCEDURE — G8432 DEP SCR NOT DOC, RNG: HCPCS | Performed by: INTERNAL MEDICINE

## 2020-12-08 PROCEDURE — G8428 CUR MEDS NOT DOCUMENT: HCPCS | Performed by: INTERNAL MEDICINE

## 2020-12-08 NOTE — PROGRESS NOTES
1. Have you been to the ER, urgent care clinic since your last visit? Hospitalized since your last visit?     no    2. Have you seen or consulted any other health care providers outside of the [address] [place] [address] since your last visit? Include any pap smears or colon screening. Yes When: x 1 month ago with PCP      3. Do you need any refills today?    No

## 2020-12-09 ENCOUNTER — HOSPITAL ENCOUNTER (OUTPATIENT)
Dept: LAB | Age: 60
Discharge: HOME | End: 2020-12-09
Payer: MEDICARE

## 2020-12-09 DIAGNOSIS — I50.32 DIASTOLIC CHF, CHRONIC (HCC): ICD-10-CM

## 2020-12-09 LAB
ALBUMIN SERPL-MCNC: 3.8 G/DL (ref 3.4–5)
ALBUMIN/GLOB SERPL: 1 {RATIO} (ref 0.8–1.7)
ALP SERPL-CCNC: 98 U/L (ref 45–117)
ALT SERPL-CCNC: 35 U/L (ref 13–56)
ANION GAP SERPL CALC-SCNC: 9 MMOL/L (ref 3–18)
APTT PPP: 28.8 SEC (ref 23–36.4)
AST SERPL-CCNC: 15 U/L (ref 10–38)
BASOPHILS # BLD: 0 K/UL (ref 0–0.1)
BASOPHILS NFR BLD: 0 % (ref 0–2)
BILIRUB SERPL-MCNC: 0.4 MG/DL (ref 0.2–1)
BUN SERPL-MCNC: 18 MG/DL (ref 7–18)
BUN/CREAT SERPL: 16 (ref 12–20)
CALCIUM SERPL-MCNC: 9.8 MG/DL (ref 8.5–10.1)
CHLORIDE SERPL-SCNC: 99 MMOL/L (ref 100–111)
CO2 SERPL-SCNC: 29 MMOL/L (ref 21–32)
CREAT SERPL-MCNC: 1.13 MG/DL (ref 0.6–1.3)
DIFFERENTIAL METHOD BLD: ABNORMAL
EOSINOPHIL # BLD: 0 K/UL (ref 0–0.4)
EOSINOPHIL NFR BLD: 0 % (ref 0–5)
ERYTHROCYTE [DISTWIDTH] IN BLOOD BY AUTOMATED COUNT: 13.8 % (ref 11.6–14.5)
GLOBULIN SER CALC-MCNC: 3.7 G/DL (ref 2–4)
GLUCOSE SERPL-MCNC: 326 MG/DL (ref 74–99)
HCT VFR BLD AUTO: 38.8 % (ref 35–45)
HGB BLD-MCNC: 13 G/DL (ref 12–16)
INR PPP: 1 (ref 0.8–1.2)
LYMPHOCYTES # BLD: 1.1 K/UL (ref 0.9–3.6)
LYMPHOCYTES NFR BLD: 16 % (ref 21–52)
MCH RBC QN AUTO: 28.4 PG (ref 24–34)
MCHC RBC AUTO-ENTMCNC: 33.5 G/DL (ref 31–37)
MCV RBC AUTO: 84.7 FL (ref 74–97)
MONOCYTES # BLD: 0.3 K/UL (ref 0.05–1.2)
MONOCYTES NFR BLD: 4 % (ref 3–10)
NEUTS SEG # BLD: 5.5 K/UL (ref 1.8–8)
NEUTS SEG NFR BLD: 80 % (ref 40–73)
PLATELET # BLD AUTO: 342 K/UL (ref 135–420)
PMV BLD AUTO: 9.5 FL (ref 9.2–11.8)
POTASSIUM SERPL-SCNC: 4.8 MMOL/L (ref 3.5–5.5)
PROT SERPL-MCNC: 7.5 G/DL (ref 6.4–8.2)
PROTHROMBIN TIME: 13.3 SEC (ref 11.5–15.2)
RBC # BLD AUTO: 4.58 M/UL (ref 4.2–5.3)
SODIUM SERPL-SCNC: 137 MMOL/L (ref 136–145)
TSH SERPL DL<=0.05 MIU/L-ACNC: 2.29 UIU/ML (ref 0.36–3.74)
WBC # BLD AUTO: 6.9 K/UL (ref 4.6–13.2)

## 2020-12-09 PROCEDURE — 80053 COMPREHEN METABOLIC PANEL: CPT

## 2020-12-09 PROCEDURE — 85730 THROMBOPLASTIN TIME PARTIAL: CPT

## 2020-12-09 PROCEDURE — 36415 COLL VENOUS BLD VENIPUNCTURE: CPT

## 2020-12-09 PROCEDURE — 85610 PROTHROMBIN TIME: CPT

## 2020-12-09 PROCEDURE — 85025 COMPLETE CBC W/AUTO DIFF WBC: CPT

## 2020-12-09 PROCEDURE — 84443 ASSAY THYROID STIM HORMONE: CPT

## 2020-12-13 NOTE — PROGRESS NOTES
HISTORY OF PRESENT ILLNESS  Ruben Khan is a 63 y.o. female. Shortness of Breath   The history is provided by the patient. This is a chronic problem. The problem occurs frequently. The problem has not changed since onset. Associated symptoms include PND, orthopnea and leg swelling. Pertinent negatives include no fever, no ear pain, no neck pain, no cough, no sputum production, no hemoptysis, no wheezing, no vomiting, no rash and no claudication. Associated medical issues include COPD. Hypertension   The history is provided by the patient. This is a chronic problem. The problem has not changed since onset. Associated symptoms include shortness of breath. Review of Systems   Constitutional: Negative for chills, diaphoresis, fever and weight loss. HENT: Negative for ear pain and hearing loss. Eyes: Negative for blurred vision. Respiratory: Positive for shortness of breath. Negative for cough, hemoptysis, sputum production, wheezing and stridor. Cardiovascular: Positive for orthopnea, leg swelling and PND. Negative for palpitations and claudication. Gastrointestinal: Negative for heartburn, nausea and vomiting. Musculoskeletal: Negative for myalgias and neck pain. Skin: Negative for rash. Neurological: Negative for dizziness, tingling, tremors, focal weakness, loss of consciousness and weakness. Psychiatric/Behavioral: Negative for depression and suicidal ideas. Physical Exam   Constitutional: She is oriented to person, place, and time. She appears well-developed and well-nourished. HENT:   Head: Normocephalic and atraumatic. Eyes: Conjunctivae and EOM are normal. No scleral icterus. Neck: Normal range of motion. Neck supple. No JVD present. Cardiovascular: Normal rate, regular rhythm and normal heart sounds. Exam reveals no gallop and no friction rub. No murmur heard. Pulmonary/Chest: Effort normal. No stridor. No respiratory distress. She has no wheezes.  She has rales (mild diffuse rhonchi). She exhibits no tenderness. Abdominal: She exhibits no distension. There is no abdominal tenderness. Musculoskeletal: Normal range of motion. General: Edema (1+ ble edema) present. No tenderness. Lymphadenopathy:     She has no cervical adenopathy. Neurological: She is alert and oriented to person, place, and time. No cranial nerve deficit. Skin: No rash noted. No erythema. Psychiatric: She has a normal mood and affect. Her behavior is normal.     ekg sinus rhythm with non specific st-t changes  05/21/20   ECHO ADULT COMPLETE 05/25/2020 5/25/2020    Narrative · Image quality for this study was technically difficult. Contrast used:   DEFINITY. · Normal cavity size and systolic function (ejection fraction normal). Moderate concentric hypertrophy. Estimated left ventricular ejection   fraction is 65 - 70%. Visually measured ejection fraction. No regional   wall motion abnormality noted. Moderate (grade 2) left ventricular   diastolic dysfunction. · Mildly dilated left atrium. · Pulmonary hypertension. Pulmonary arterial systolic pressure is 61 mmHg. · Severely elevated central venous pressure (15+ mmHg); IVC diameter is   larger than 21 mm and collapses less than 50% with respiration. Signed by: Andrzej Lancaster MD       ASSESSMENT and PLAN    ICD-10-CM [id number]    1. Diastolic CHF, chronic (HCC)  S57.96 472.44 METABOLIC PANEL, COMPREHENSIVE     428.0 PROTHROMBIN TIME + INR      PTT      TSH 3RD GENERATION      CBC WITH AUTOMATED DIFF      CASE REQUEST CATH LAB   2. Pulmonary HTN (HCC)  I27.20 416.8 CASE REQUEST CATH LAB   3. Essential hypertension, benign  I10 401.1    4. Type 2 diabetes mellitus without complication, unspecified whether long term insulin use (HCC)  E11.9 250.00    5.  Chronic obstructive pulmonary disease, unspecified COPD type ([facility]  J44.9 496      Orders Placed This Encounter    METABOLIC PANEL, COMPREHENSIVE     Standing Status: Future     Number of Occurrences:   1     Standing Expiration Date:   12/9/2021    PROTHROMBIN TIME + INR     Standing Status:   Future     Number of Occurrences:   1     Standing Expiration Date:   12/9/2021    PTT     Standing Status:   Future     Number of Occurrences:   1     Standing Expiration Date:   12/9/2021    TSH 3RD GENERATION     Standing Status:   Future     Number of Occurrences:   1     Standing Expiration Date:   12/9/2021    CBC WITH AUTOMATED DIFF     Standing Status:   Future     Number of Occurrences:   1     Standing Expiration Date:   12/9/2021     Follow-up and Dispositions    · Return in about 1 month (around 1/8/2021). reviewed diet, exercise and weight control. Patient is a 63 yr old female with severe COPD on meds seen for worsening dyspnea-- clinical symptoms and signs consistent with superimposed diastolic CHF. Lasix dose adjusted- symptoms initially improved- however subsequently worsened. Will proceed with [facility] to evaluate CAD.

## 2020-12-17 ENCOUNTER — TELEPHONE (OUTPATIENT)
Dept: PULMONOLOGY | Age: 60
End: 2020-12-17

## 2020-12-17 NOTE — TELEPHONE ENCOUNTER
Pulmonary Rehab called patient and left a message on her home and cell about the program. Additional attempts at contact will be made.     Thank you,  Shonna Montgomery

## 2020-12-18 ENCOUNTER — APPOINTMENT (OUTPATIENT)
Dept: GENERAL RADIOLOGY | Age: 60
DRG: 286 | End: 2020-12-18
Attending: STUDENT IN AN ORGANIZED HEALTH CARE EDUCATION/TRAINING PROGRAM
Payer: MEDICARE

## 2020-12-18 ENCOUNTER — HOSPITAL ENCOUNTER (INPATIENT)
Age: 60
LOS: 4 days | Discharge: HOME | DRG: 286 | End: 2020-12-22
Attending: INTERNAL MEDICINE | Admitting: FAMILY MEDICINE
Payer: MEDICARE

## 2020-12-18 DIAGNOSIS — I45.10 RBBB: ICD-10-CM

## 2020-12-18 DIAGNOSIS — R06.02 SOB (SHORTNESS OF BREATH): ICD-10-CM

## 2020-12-18 DIAGNOSIS — I50.9 CONGESTIVE HEART FAILURE (CHF) (HCC): ICD-10-CM

## 2020-12-18 DIAGNOSIS — N17.9 AKI (ACUTE KIDNEY INJURY) (HCC): ICD-10-CM

## 2020-12-18 DIAGNOSIS — I50.33 ACUTE ON CHRONIC DIASTOLIC CONGESTIVE HEART FAILURE (HCC): ICD-10-CM

## 2020-12-18 DIAGNOSIS — I27.20 PULMONARY HTN (HCC): ICD-10-CM

## 2020-12-18 LAB
ANION GAP SERPL CALC-SCNC: 6 MMOL/L (ref 3–18)
BNP SERPL-MCNC: 50 PG/ML (ref 0–900)
BUN SERPL-MCNC: 18 MG/DL (ref 7–18)
BUN/CREAT SERPL: 13 (ref 12–20)
CALCIUM SERPL-MCNC: 9.7 MG/DL (ref 8.5–10.1)
CHLORIDE SERPL-SCNC: 100 MMOL/L (ref 100–111)
CO2 SERPL-SCNC: 29 MMOL/L (ref 21–32)
CREAT SERPL-MCNC: 1.34 MG/DL (ref 0.6–1.3)
ERYTHROCYTE [DISTWIDTH] IN BLOOD BY AUTOMATED COUNT: 13.9 % (ref 11.6–14.5)
EST. AVERAGE GLUCOSE BLD GHB EST-MCNC: 212 MG/DL
GLUCOSE BLD STRIP.AUTO-MCNC: 280 MG/DL (ref 70–110)
GLUCOSE BLD STRIP.AUTO-MCNC: 311 MG/DL (ref 70–110)
GLUCOSE SERPL-MCNC: 376 MG/DL (ref 74–99)
HBA1C MFR BLD: 9 % (ref 4.2–5.6)
HCT VFR BLD AUTO: 40.4 % (ref 35–45)
HGB BLD-MCNC: 13.2 G/DL (ref 12–16)
MCH RBC QN AUTO: 27.8 PG (ref 24–34)
MCHC RBC AUTO-ENTMCNC: 32.7 G/DL (ref 31–37)
MCV RBC AUTO: 85.2 FL (ref 74–97)
PLATELET # BLD AUTO: 356 K/UL (ref 135–420)
PMV BLD AUTO: 9.4 FL (ref 9.2–11.8)
POTASSIUM SERPL-SCNC: 4.6 MMOL/L (ref 3.5–5.5)
RBC # BLD AUTO: 4.74 M/UL (ref 4.2–5.3)
SODIUM SERPL-SCNC: 135 MMOL/L (ref 136–145)
WBC # BLD AUTO: 8.3 K/UL (ref 4.6–13.2)

## 2020-12-18 PROCEDURE — 85027 COMPLETE CBC AUTOMATED: CPT

## 2020-12-18 PROCEDURE — 93005 ELECTROCARDIOGRAM TRACING: CPT

## 2020-12-18 PROCEDURE — 74011250636 HC RX REV CODE- 250/636: Performed by: STUDENT IN AN ORGANIZED HEALTH CARE EDUCATION/TRAINING PROGRAM

## 2020-12-18 PROCEDURE — 82962 GLUCOSE BLOOD TEST: CPT

## 2020-12-18 PROCEDURE — 74011250637 HC RX REV CODE- 250/637: Performed by: STUDENT IN AN ORGANIZED HEALTH CARE EDUCATION/TRAINING PROGRAM

## 2020-12-18 PROCEDURE — 80048 BASIC METABOLIC PNL TOTAL CA: CPT

## 2020-12-18 PROCEDURE — 94640 AIRWAY INHALATION TREATMENT: CPT

## 2020-12-18 PROCEDURE — 74011636637 HC RX REV CODE- 636/637: Performed by: INTERNAL MEDICINE

## 2020-12-18 PROCEDURE — 71046 X-RAY EXAM CHEST 2 VIEWS: CPT

## 2020-12-18 PROCEDURE — 83880 ASSAY OF NATRIURETIC PEPTIDE: CPT

## 2020-12-18 PROCEDURE — 83036 HEMOGLOBIN GLYCOSYLATED A1C: CPT

## 2020-12-18 PROCEDURE — 74011636637 HC RX REV CODE- 636/637: Performed by: STUDENT IN AN ORGANIZED HEALTH CARE EDUCATION/TRAINING PROGRAM

## 2020-12-18 PROCEDURE — 74011250636 HC RX REV CODE- 250/636: Performed by: INTERNAL MEDICINE

## 2020-12-18 PROCEDURE — 65660000000 HC RM CCU STEPDOWN

## 2020-12-18 PROCEDURE — 74011000250 HC RX REV CODE- 250: Performed by: STUDENT IN AN ORGANIZED HEALTH CARE EDUCATION/TRAINING PROGRAM

## 2020-12-18 PROCEDURE — 2709999900 HC NON-CHARGEABLE SUPPLY

## 2020-12-18 RX ORDER — INSULIN GLARGINE 100 [IU]/ML
65 INJECTION, SOLUTION SUBCUTANEOUS
COMMUNITY

## 2020-12-18 RX ORDER — IPRATROPIUM BROMIDE 0.5 MG/2.5ML
SOLUTION RESPIRATORY (INHALATION)
Status: DISCONTINUED | OUTPATIENT
Start: 2020-12-18 | End: 2020-12-22 | Stop reason: HOSPADM

## 2020-12-18 RX ORDER — INSULIN GLARGINE 100 [IU]/ML
20 INJECTION, SOLUTION SUBCUTANEOUS EVERY MORNING
Status: DISCONTINUED | OUTPATIENT
Start: 2020-12-19 | End: 2020-12-18

## 2020-12-18 RX ORDER — FLUTICASONE PROPIONATE 50 MCG
2 SPRAY, SUSPENSION (ML) NASAL DAILY
Status: DISCONTINUED | OUTPATIENT
Start: 2020-12-19 | End: 2020-12-22 | Stop reason: HOSPADM

## 2020-12-18 RX ORDER — ARFORMOTEROL TARTRATE 15 UG/2ML
15 SOLUTION RESPIRATORY (INHALATION)
Status: DISCONTINUED | OUTPATIENT
Start: 2020-12-18 | End: 2020-12-22 | Stop reason: HOSPADM

## 2020-12-18 RX ORDER — SODIUM CHLORIDE 0.9 % (FLUSH) 0.9 %
5-40 SYRINGE (ML) INJECTION EVERY 8 HOURS
Status: DISCONTINUED | OUTPATIENT
Start: 2020-12-18 | End: 2020-12-22 | Stop reason: HOSPADM

## 2020-12-18 RX ORDER — PANTOPRAZOLE SODIUM 40 MG/1
40 TABLET, DELAYED RELEASE ORAL
Status: DISCONTINUED | OUTPATIENT
Start: 2020-12-19 | End: 2020-12-22 | Stop reason: HOSPADM

## 2020-12-18 RX ORDER — INSULIN GLARGINE 100 [IU]/ML
45 INJECTION, SOLUTION SUBCUTANEOUS
Status: DISCONTINUED | OUTPATIENT
Start: 2020-12-18 | End: 2020-12-18

## 2020-12-18 RX ORDER — SODIUM CHLORIDE 0.9 % (FLUSH) 0.9 %
5-40 SYRINGE (ML) INJECTION AS NEEDED
Status: DISCONTINUED | OUTPATIENT
Start: 2020-12-18 | End: 2020-12-22 | Stop reason: HOSPADM

## 2020-12-18 RX ORDER — DEXTROSE 50 % IN WATER (D50W) INTRAVENOUS SYRINGE
25-50 AS NEEDED
Status: DISCONTINUED | OUTPATIENT
Start: 2020-12-18 | End: 2020-12-22 | Stop reason: HOSPADM

## 2020-12-18 RX ORDER — AZITHROMYCIN 250 MG/1
250 TABLET, FILM COATED ORAL
Status: DISCONTINUED | OUTPATIENT
Start: 2020-12-18 | End: 2020-12-22 | Stop reason: HOSPADM

## 2020-12-18 RX ORDER — IPRATROPIUM BROMIDE AND ALBUTEROL SULFATE 2.5; .5 MG/3ML; MG/3ML
3 SOLUTION RESPIRATORY (INHALATION)
Status: DISCONTINUED | OUTPATIENT
Start: 2020-12-18 | End: 2020-12-22 | Stop reason: HOSPADM

## 2020-12-18 RX ORDER — ASPIRIN 81 MG/1
81 TABLET ORAL DAILY
Status: DISCONTINUED | OUTPATIENT
Start: 2020-12-19 | End: 2020-12-22 | Stop reason: HOSPADM

## 2020-12-18 RX ORDER — PREDNISONE 10 MG/1
5 TABLET ORAL DAILY
Status: DISCONTINUED | OUTPATIENT
Start: 2020-12-19 | End: 2020-12-20

## 2020-12-18 RX ORDER — SIMETHICONE 125 MG
125 CAPSULE ORAL
COMMUNITY

## 2020-12-18 RX ORDER — INSULIN GLARGINE 100 [IU]/ML
30 INJECTION, SOLUTION SUBCUTANEOUS
Status: DISCONTINUED | OUTPATIENT
Start: 2020-12-18 | End: 2020-12-22 | Stop reason: HOSPADM

## 2020-12-18 RX ORDER — BUDESONIDE 0.5 MG/2ML
1000 INHALANT ORAL
Status: DISCONTINUED | OUTPATIENT
Start: 2020-12-18 | End: 2020-12-22 | Stop reason: HOSPADM

## 2020-12-18 RX ORDER — FUROSEMIDE 10 MG/ML
INJECTION INTRAMUSCULAR; INTRAVENOUS AS NEEDED
Status: DISCONTINUED | OUTPATIENT
Start: 2020-12-18 | End: 2020-12-18 | Stop reason: HOSPADM

## 2020-12-18 RX ORDER — INSULIN GLARGINE 100 [IU]/ML
25 INJECTION, SOLUTION SUBCUTANEOUS
Status: DISCONTINUED | OUTPATIENT
Start: 2020-12-18 | End: 2020-12-18

## 2020-12-18 RX ORDER — HEPARIN SODIUM 5000 [USP'U]/ML
5000 INJECTION, SOLUTION INTRAVENOUS; SUBCUTANEOUS EVERY 8 HOURS
Status: DISCONTINUED | OUTPATIENT
Start: 2020-12-18 | End: 2020-12-22 | Stop reason: HOSPADM

## 2020-12-18 RX ORDER — SIMETHICONE 80 MG
80 TABLET,CHEWABLE ORAL
Status: DISCONTINUED | OUTPATIENT
Start: 2020-12-18 | End: 2020-12-22 | Stop reason: HOSPADM

## 2020-12-18 RX ORDER — LISINOPRIL 20 MG/1
20 TABLET ORAL 2 TIMES DAILY
Status: DISCONTINUED | OUTPATIENT
Start: 2020-12-18 | End: 2020-12-22 | Stop reason: HOSPADM

## 2020-12-18 RX ORDER — INSULIN GLARGINE 100 [IU]/ML
20 INJECTION, SOLUTION SUBCUTANEOUS
Status: DISCONTINUED | OUTPATIENT
Start: 2020-12-18 | End: 2020-12-18

## 2020-12-18 RX ORDER — ACETAMINOPHEN 500 MG
500 TABLET ORAL
Status: DISCONTINUED | OUTPATIENT
Start: 2020-12-18 | End: 2020-12-22 | Stop reason: HOSPADM

## 2020-12-18 RX ORDER — FUROSEMIDE 40 MG/1
40 TABLET ORAL 2 TIMES DAILY
Status: DISCONTINUED | OUTPATIENT
Start: 2020-12-18 | End: 2020-12-22

## 2020-12-18 RX ORDER — INSULIN LISPRO 100 [IU]/ML
INJECTION, SOLUTION INTRAVENOUS; SUBCUTANEOUS
Status: DISCONTINUED | OUTPATIENT
Start: 2020-12-18 | End: 2020-12-22 | Stop reason: HOSPADM

## 2020-12-18 RX ORDER — FUROSEMIDE 10 MG/ML
40 INJECTION INTRAMUSCULAR; INTRAVENOUS 2 TIMES DAILY
Status: DISCONTINUED | OUTPATIENT
Start: 2020-12-18 | End: 2020-12-19

## 2020-12-18 RX ORDER — MAGNESIUM SULFATE 100 %
4 CRYSTALS MISCELLANEOUS AS NEEDED
Status: DISCONTINUED | OUTPATIENT
Start: 2020-12-18 | End: 2020-12-22 | Stop reason: HOSPADM

## 2020-12-18 RX ORDER — MONTELUKAST SODIUM 10 MG/1
10 TABLET ORAL
Status: DISCONTINUED | OUTPATIENT
Start: 2020-12-18 | End: 2020-12-22 | Stop reason: HOSPADM

## 2020-12-18 RX ORDER — ALBUTEROL SULFATE 0.83 MG/ML
2.5 SOLUTION RESPIRATORY (INHALATION)
Status: DISCONTINUED | OUTPATIENT
Start: 2020-12-18 | End: 2020-12-22 | Stop reason: HOSPADM

## 2020-12-18 RX ORDER — INSULIN GLARGINE 100 [IU]/ML
25 INJECTION, SOLUTION SUBCUTANEOUS EVERY MORNING
Status: DISCONTINUED | OUTPATIENT
Start: 2020-12-19 | End: 2020-12-18

## 2020-12-18 RX ORDER — MIDAZOLAM HYDROCHLORIDE 1 MG/ML
0.5 INJECTION, SOLUTION INTRAMUSCULAR; INTRAVENOUS ONCE
Status: DISPENSED | OUTPATIENT
Start: 2020-12-18 | End: 2020-12-19

## 2020-12-18 RX ADMIN — HEPARIN SODIUM 5000 UNITS: 5000 INJECTION INTRAVENOUS; SUBCUTANEOUS at 21:24

## 2020-12-18 RX ADMIN — Medication 10 ML: at 21:39

## 2020-12-18 RX ADMIN — AZITHROMYCIN MONOHYDRATE 250 MG: 250 TABLET ORAL at 21:20

## 2020-12-18 RX ADMIN — METHYLPREDNISOLONE SODIUM SUCCINATE 40 MG: 40 INJECTION, POWDER, FOR SOLUTION INTRAMUSCULAR; INTRAVENOUS at 21:24

## 2020-12-18 RX ADMIN — MONTELUKAST SODIUM 10 MG: 10 TABLET, COATED ORAL at 21:20

## 2020-12-18 RX ADMIN — INSULIN GLARGINE 30 UNITS: 100 INJECTION, SOLUTION SUBCUTANEOUS at 21:55

## 2020-12-18 RX ADMIN — INSULIN LISPRO 9 UNITS: 100 INJECTION, SOLUTION INTRAVENOUS; SUBCUTANEOUS at 17:25

## 2020-12-18 RX ADMIN — INSULIN LISPRO 12 UNITS: 100 INJECTION, SOLUTION INTRAVENOUS; SUBCUTANEOUS at 21:25

## 2020-12-18 RX ADMIN — FUROSEMIDE 40 MG: 10 INJECTION, SOLUTION INTRAMUSCULAR; INTRAVENOUS at 21:25

## 2020-12-18 RX ADMIN — IPRATROPIUM BROMIDE 0.5 MG: 0.5 SOLUTION RESPIRATORY (INHALATION) at 23:29

## 2020-12-18 RX ADMIN — METHYLPREDNISOLONE SODIUM SUCCINATE 125 MG: 125 INJECTION, POWDER, FOR SOLUTION INTRAMUSCULAR; INTRAVENOUS at 12:00

## 2020-12-18 NOTE — ROUTINE PROCESS
TRANSFER - IN REPORT:    Verbal report received from [facility]) on [place]  being received from Cath Lab(unit) for routine progression of care      Report consisted of patients Situation, Background, Assessment and   Recommendations(SBAR). Information from the following report(s) SBAR, Kardex, Intake/Output and MAR was reviewed with the receiving nurse. Opportunity for questions and clarification was provided.       Awaiting pt arrival to unit

## 2020-12-18 NOTE — Clinical Note
TRANSFER - OUT REPORT:     Verbal report given to: [employer]. Report consisted of patient's Situation, Background, Assessment and   Recommendations(SBAR). Opportunity for questions and clarification was provided. Patient transported with a Cardiac Cath Tech / Patient Care Tech. Patient transported to: [address]

## 2020-12-18 NOTE — Clinical Note
TRANSFER - OUT REPORT:     Verbal report given to: Debora Young RN. Report consisted of patient's Situation, Background, Assessment and   Recommendations(SBAR). Opportunity for questions and clarification was provided. Patient transported with a Cardiac Cath Tech / Patient Care Tech. Patient transported to: [address].

## 2020-12-18 NOTE — Clinical Note
Contrast Dose Calculator:   Patient's age: 63.   Patient's sex: Female. Patient weight (kg) = 119.7. Creatinine level (mg/dL) = 0.98. Creatinine clearance (mL/min): 114. Contrast concentration (mg/mL) = 300. MACD = 300 mL. Max Contrast dose per Creatinine Cl calculator = 256.5 mL.

## 2020-12-18 NOTE — H&P
Admission History and Physical  [facility] Medicine      Patient: Noe Zuniga MRN: [id number]  CSN: [id number]    YOB: 1960  Age: 63 y.o. Sex: female      Admission Date: 12/18/2020       HPI:   Barry Mendez a 63 y.o. female with PMH of HFpEF, COPD on 3LNC home 02, T2DM, HTN, SHERRIE on Triology Avaps/BiPAP, GERD, Allergic Rhinitis, now with complaint of SOB.     PFM called by Dr. Jossy Garcia after patient developed worsening SOB prior to scheduled right and left cath lab. Symptoms thought to be due to fluid overload and that she would benefit from admission, diuresis, and postponing procedure to [facility] was given 40mg IV lasix, which helped her breathing. Of note, patient was also pre-medicated with Solumedrol 125mg IV x1 since she has a contrast allergy. Steroids did not help as much as the lasix according to report.      Patient reports a 2 week history of worsening shortness of breath described as \"feels like I'm drowning\", which is different from her usual COPD exacerbations. SOB associated with wheezing, chest tightness, 4-5 lb weight gain in 2 days, and leg swelling. Denies headache, blurry vision, fever/chills, cough.      Because patient was getting cath today, she did not take her Lasix the night before as she was following pre-procedure protocol.  She also states that she has not been able to lie flat in her bed so she has been sleeping in her chair.      Denies sick contacts or COVID exposure.     Review of Systems:  General ROS: negative for  - chills, fever, night sweats, weight gain and weight loss  Psychological ROS: negative for - anxiety and depression  Ophthalmic ROS: negative for - blurry vision, decreased vision or loss of vision  ENT ROS: negative for - headaches, hearing change or visual changes  Hematological and Lymphatic ROS: negative for - bruising, jaundice  Respiratory ROS: negative for - increased cough, hemoptysis, shortness of breath, paroxysmal dyspnea, or wheezing. Endorsing orthopnea.   Cardiovascular ROS: negative for - chest pain, dyspnea on exertion, edema, loss of consciousness, or palpitations   Gastrointestinal ROS: negative for - abdominal pain, blood in stools, change in stools, constipation, diarrhea, hematemesis, melena, nausea/vomiting or swallowing difficulty/pain  Genito-Urinary ROS: negative for - dysuria, hematuria or urinary frequency/urgency  Musculoskeletal ROS: negative for - joint pain, joint swelling or muscle pain  Neurological ROS: negative for - dizziness, headaches, numbness/tingling or weakness  Dermatological ROS: negative for - rash or skin lesion changes    Past Medical History:   Diagnosis Date    Asthma     Chronic lung disease     COPD     Cystocele, midline     Diabetes mellitus ([facility] [address]     GERD (gastroesophageal reflux disease)     Hidradenitis suppurativa     Hyperlipidemia     Hypertension     SHERRIE on CPAP     CPAP    Stress incontinence        Past Surgical History:   Procedure Laterality Date    BREAST SURGERY PROCEDURE UNLISTED      Right breast benign tumor removal    HX APPENDECTOMY      HX CHOLECYSTECTOMY      HX DILATION AND CURETTAGE      Dysfunctional uterine bleeding, thought 2/2 fibroids    HX TUBAL LIGATION         Family History   Problem Relation Age of Onset    Hypertension Mother     Stroke Mother     Breast Cancer Mother         Bilateral mastectomies    Cancer Mother         ovarian and breast    Heart Failure Mother     Ovarian Cancer Mother     Heart Attack Father         2011    Heart Surgery Father         CABG    Heart Failure Father     COPD Sister         Heavy smoker    Cancer Sister         ovarian    Heart Failure Sister     Lung Disease Sister     Asthma Child     Cancer Maternal Aunt         pancreatic    Cancer Maternal Grandfather         stomach       Social History     Socioeconomic History    Marital status: LEGALLY      Spouse name: Not on file    Number of children: Not on file    Years of education: Not on file    Highest education level: Not on file   Tobacco Use    Smoking status: Former Smoker     Packs/day: 1.00     Years: 30.00     Pack years: 30.00     Types: Cigarettes     Start date: 1966     Quit date: 2006     Years since quittin.2    Smokeless tobacco: Former User    Tobacco comment: 1-1.5 packs per day   Substance and Sexual Activity    Alcohol use: No    Drug use: No    Sexual activity: Not Currently       Allergies   Allergen Reactions    Ancef [Cefazolin] Hives    Contrast Agent [Iodine] Anaphylaxis, Shortness of Breath and Swelling     Needs pre-medication for IV contrast with Benadryl, Solu-Medrol    Fish Containing Products Anaphylaxis     Pt states she had a severe allergic reaction at 7 y/o.  Statins-Hmg-Coa Reductase Inhibitors Myalgia    Metformin Other (comments)     Abdominal pain, diarrhea.  Codeine Other (comments)     Altered mental status       Prior to Admission Medications   Prescriptions Last Dose Informant Patient Reported? Taking? Blood-Glucose Meter (FreeStyle Freedom Lite) monitoring kit   Yes No   Sig: CHECK BLOOD SUGARS B.I.D. E11.9   Insulin Needles, Disposable, (BD Ultra-Fine Mini Pen Needle) 31 gauge x 3/16\" ndle   Yes No   Sig: Use with Basaglar once daily. Insulin Syringe-Needle U-100 0.3 mL 31 gauge x 5/16\" syrg   Yes No   Sig: USE AS DIRECTED   NOVOLOG FLEXPEN U-100 INSULIN 100 unit/mL in 2020 at Unknown time Self No Yes   Sig: Continue home Sliding scale insulin as prior to admission   Patient taking differently: 1 Units by SubCUTAneous route three (3) times daily. If BG <100=0u  101-150=5u  151-250=8u  251-300=12u  >300 call MD     OXYGEN-AIR DELIVERY SYSTEMS   Yes No   Si L by Nasal route continuous.  First Choice between 2L and 3L   acetaminophen (TYLENOL) 500 mg tablet 2020 at Unknown time  Yes Yes   Sig: Take 500 mg by mouth every six (6) hours as needed for Fever or Pain. albuterol (PROVENTIL HFA, VENTOLIN HFA, PROAIR HFA) 90 mcg/actuation inhaler 2020 at Unknown time  No Yes   Sig: Take 2 Puffs by inhalation every four (4) hours as needed for Wheezing. albuterol-ipratropium (DUO-NEB) 2.5 mg-0.5 mg/3 ml nebu 2020 at Unknown time  Yes Yes   Sig: 3 mL by Nebulization route every six (6) hours as needed for Wheezing. aspirin delayed-release 81 mg tablet 2020 at Unknown time  Yes Yes   Sig: Take 81 mg by mouth daily. azithromycin (ZITHROMAX) 250 mg tablet 2020 at Unknown time  Yes Yes   Sig: Take 1 Tab by mouth every Monday, Wednesday, Friday. fluticasone propion-salmeteroL (ADVAIR HFA) 213-76 mcg/actuation inhaler 2020 at Unknown time  No Yes   Sig: Take 2 Puffs by inhalation two (2) times a day. fluticasone propionate (FLONASE) 50 mcg/actuation nasal spray 2020 at Unknown time  Yes Yes   Si Sprays by Nasal route daily. furosemide (LASIX) 40 mg tablet 2020 at Unknown time  No Yes   Sig: Take 1 Tab by mouth two (2) times a day. Patient taking differently: Take 40 mg by mouth two (2) times a day. Take 1 tablet in the morning and 1/2 tablet in the evenings. glucose blood VI test strips (FreeStyle Lite Strips) strip   Yes No   Sig: Use four times daily for blood sugar checks. insulin lispro (HumaLOG U-100 Insulin) 100 unit/mL injection 2020 at Unknown time  Yes Yes   Sig: by SubCUTAneous route. 45 units   lancets (FreeStyle Lancets) 28 gauge misc   Yes No   Sig: Use four times daily for blood sugar checks   lisinopril (PRINIVIL, ZESTRIL) 20 mg tablet 2020 at Unknown time  Yes Yes   Sig: Take 20 mg by mouth two (2) times a day. montelukast (SINGULAIR) 10 mg tablet 2020 at Unknown time  Yes Yes   Sig: Take 10 mg by mouth nightly. omeprazole (PRILOSEC) 40 mg capsule 2020 at Unknown time  Yes Yes   Sig: Take 40 mg by mouth daily.    predniSONE (DELTASONE) 5 mg tablet 12/17/2020 at Unknown time  Yes Yes   Sig: Take 5 mg by mouth daily. simethicone (GAS-X) 125 mg capsule   Yes Yes   Sig: Take 125 mg by mouth four (4) times daily as needed for Flatulence. tiotropium bromide (SPIRIVA RESPIMAT) 2.5 mcg/actuation inhaler 12/17/2020 at Unknown time  Yes Yes   Sig: Take 2 Puffs by inhalation daily. Facility-Administered Medications: None       Physical Exam:     Patient Vitals for the past 24 hrs:   Pulse Resp BP SpO2   12/18/20 1456 84 -- 137/60 97 %   12/18/20 1451 82 22 (!) 131/54 96 %   12/18/20 1436 86 30 134/60 97 %   12/18/20 1336 86 28 (!) 148/62 98 %   12/18/20 1321 84 23 127/63 98 %   12/18/20 1306 86 28 (!) 153/86 98 %   12/18/20 1251 84 27 (!) 150/79 100 %   12/18/20 1238 84 21 138/64 97 %   12/18/20 1132 76 22 (!) 157/56 100 %   12/18/20 1117 78 26 121/76 99 %   12/18/20 1102 76 22 (!) 141/62 99 %   12/18/20 1048 80 25 (!) 146/86 100 %   12/18/20 1032 80 29 (!) 155/69 95 %       Physical Exam:   General:  AAOx3, NAD   HEENT: Conjunctiva pink, sclera anicteric. PERRL. EOMI. Pharynx moist, nonerythematous. Moist mucous membranes. No cervical, supraclavicular, occipital or submandibular lymphadenopathy. No other gross abnormalities present. CV: Distant heart sounds. RRR, no murmurs. No visible pulsations or thrills. RESP:  Greatly diminished breath sounds through out, worst at bases. Scattered faint wheezes. ABD:  Soft, nontender, nondistended. BS (+). No hepatosplenomegaly. No suprapubic tenderness. MS:  No joint deformity or instability. No atrophy. Neuro:  CN II-XII grossly intact. 5/5 strength bilateral upper extremities and lower extremities. Ext:  +1 pitting edema in bilateral lower limbs. Subjective edema in upper limbs. +1 pitting edema on trunk. 2+ radial and dp pulses bilaterally. Skin:  No rashes, lesions, or ulcers. Good turgor.       Chemistry No results for input(s): GLU, NA, K, CL, CO2, BUN, CREA, CA, MG, PHOS, AGAP, BUCR, TBIL, AP, TP, ALB, GLOB, AGRAT in the last 72 hours. No lab exists for component: GPT     CBC w/Diff No results for input(s): WBC, RBC, HGB, HCT, PLT, GRANS, LYMPH, EOS, HGBEXT, HCTEXT, PLTEXT in the last 72 hours. Liver Enzymes Protein, total   Date Value Ref Range Status   12/09/2020 7.5 6.4 - 8.2 g/dL Final     Albumin   Date Value Ref Range Status   12/09/2020 3.8 3.4 - 5.0 g/dL Final     Globulin   Date Value Ref Range Status   12/09/2020 3.7 2.0 - 4.0 g/dL Final     A-G Ratio   Date Value Ref Range Status   12/09/2020 1.0 0.8 - 1.7   Final     Alk. phosphatase   Date Value Ref Range Status   12/09/2020 98 45 - 117 U/L Final     No results for input(s): TP, ALB, GLOB, AGRAT, AP, TBIL in the last 72 hours. No lab exists for component: SGOT, GPT, DBIL     Lactic Acid Lactic acid   Date Value Ref Range Status   08/12/2020 1.8 0.4 - 2.0 MMOL/L Final     No results for input(s): LAC in the last 72 hours. BNP No results found for: BNP, BNPP, XBNPT     Cardiac Enzymes No results found for: CPK, CK, CKMMB, CKMB, RCK3, CKMBT, CKNDX, CKND1, KESHIA, TROPT, TROIQ, JOEL, TROPT, TNIPOC, BNP, BNPP     Coagulation No results for input(s): PTP, INR, APTT, INREXT in the last 72 hours. Thyroid  Lab Results   Component Value Date/Time    TSH 2.29 12/09/2020 03:19 PM          Lipid Panel Lab Results   Component Value Date/Time    Cholesterol, total 220 (H) 11/20/2012 03:22 AM    HDL Cholesterol 62 (H) 11/20/2012 03:22 AM    LDL, calculated 144.6 (H) 11/20/2012 03:22 AM    VLDL, calculated 13.4 11/20/2012 03:22 AM    Triglyceride 67 11/20/2012 03:22 AM    CHOL/HDL Ratio 3.5 11/20/2012 03:22 AM        ABG No results for input(s): PHI, PHI, POC2, PCO2I, PO2, PO2I, HCO3, HCO3I, FIO2, FIO2I in the last 72 hours.      Urinalysis Lab Results   Component Value Date/Time    Color YELLOW 08/31/2020 06:08 PM    Appearance CLEAR 08/31/2020 06:08 PM    Specific gravity >1.030 (H) 08/31/2020 06:08 PM    pH (UA) 5.0 08/31/2020 06:08 PM    Protein Negative 08/31/2020 06:08 PM    Glucose >1,000 (A) 08/31/2020 06:08 PM    Ketone TRACE (A) 08/31/2020 06:08 PM    Bilirubin Negative 08/31/2020 06:08 PM    Urobilinogen 1.0 08/31/2020 06:08 PM    Nitrites Negative 08/31/2020 06:08 PM    Leukocyte Esterase Negative 08/31/2020 06:08 PM    Epithelial cells 2+ 07/09/2020 07:16 PM    Bacteria 1+ (A) 07/09/2020 07:16 PM    WBC 0 to 3 07/09/2020 07:16 PM    RBC Negative 05/24/2020 11:00 AM        Micro No results for input(s): SDES, CULT in the last 72 hours. No results for input(s): CULT in the last 72 hours. Imaging:  XR (Most Recent). Results from [place] encounter on 08/07/20   XR CHEST PORT    Narrative EXAM: XR CHEST PORT    INDICATION: 60 years Female. sob/covid. ADDITIONAL HISTORY: None. TECHNIQUE: Frontal view of the chest.    COMPARISON: 7/27/2020    FINDINGS:    The cardiac silhouette is within normal limits in size. There is linear opacities in the peripheral aspect of the bilateral mid and  lower lung zones which are less pronounced than prior. No definite evidence of pleural effusion. No evidence of pneumothorax. Tethering  of the left lateral hemidiaphragm. Osseous structures are unchanged in appearance. Impression IMPRESSION:  Interval improved linear peripheral opacities in the bilateral mid and lower  lung zones, suggesting of scarring although persistent infiltrates are not  excluded. Recommend continued imaging follow-up. CT (Most Recent) Results from Hospital Encounter encounter on 11/02/20   CT LOW DOSE LUNG CANCER SCREENING    Narrative CT CHEST LUNG SCREENING    INDICATION: Lung CT screening study requested as patient needs established age  criteria, smoking history requirements, or additional risk factor eligibility  requirements. CT screening study requested as patient meets established  criteria. 48 pack year smoking history. COPD.     Technique: Low-dose computed tomography acquisition performed according to the  national comprehensive cancer network guidelines space on body mass index. Axial  raw images obtained. Reconstruction on lung windows and soft tissue windows with  additional coronal and sagittally reformatted series. No intravenous contrast  administered for this exam.    COMPARISON: CT abdomen/pelvis 8/31/2020, CT chest 2/21/2018    FINDINGS:  Lungs: Right middle lobe atelectasis is similar to 8/31/2020, progressed from  2018. Subsegmental atelectasis in the lingula. Mild bronchial wall thickening. 3  mm right upper lobe nodule (4, 76), not definitely seen on 2018 study. Pleura: No effusion. Lymph Nodes: No lymphadenopathy. Cardiovascular: Moderate calcified atherosclerotic disease, including the  coronary arteries. Bones: Mild degenerative changes in the spine. No suspicious osseous lesions. No  acute osseous abnormality. Visualized upper abdomen is unremarkable. Impression IMPRESSION:    1. Right upper lobe pulmonary nodule measuring 3 mm, not definitely seen on 2018  study. Lung-RADS 2 - Benign appearance or behavior. Management: Continue annual screening with low dose CT in 12 months. 2. Right middle lobe atelectasis is similar to 8/31/2020, progressed from 2018. 3. Mild bronchial wall thickening, likely reflecting history of COPD.        ECHO No results found for this or any previous visit. EKG No results found for this or any previous visit. Recent Results (from the past 12 hour(s))   GLUCOSE, POC    Collection Time: 12/18/20  3:21 PM   Result Value Ref Range    Glucose (POC) 280 (H) 70 - 110 mg/dL       Assessment/Plan:   63 y.o. female with PMH of COPD on home O2 (3L NC), IDDM2, HTN, HFpEF, SHERRIE on CPAP, GERD, allergic rhinitis now admitted with CHF exacerbation.     Respiratory distress 2/2 acute on chronic HFpEF & - Pt presented for cath procedure today and found to be too dyspneic to lay flat on the table and additionally to have significant anasarca. SOB gradually worsening for last several weeks with different quality that typical COPD exacerbation. Patient is in mild distress, but only requiring home 02 of 3L. Satting well %. Significant anasarca on exam. ECHO (5/24/20) BC 54 - 70%. No regional wall motion abnormality. Moderate (grade 2) left ventricular diastolic dysfunction. Mildly dilated left atrium. Pulmonary arterial systolic pressure is 61 mmHg. - Admitting to tele with continuous cardiac monitoring  - cardiology following  - ABG, CXR, EKG  - BNP pending    - Hold home lasix  - defer dieresis to cardiology    - start BiPap PRN during day  - daily standing weights  - Daily CBC, BMP   - continue home supplemental O2 (3L) with goal of 88-92%  - start solumedrol 40 TID tomorrow for 2 days  - restart home prednisone 5mg daily in 2-3 days   - SQH  - Vital signs per unit routine  - Strict I/Os  - PT/OT/CM    COPD, pulmonary hypertension & SHERRIE- Patient has COPD on max therapy w/ strong asthma component. Hospitalized 6x in 2020 for COPD exacerbations. Followed by pulm (Dr. Adrianna Bell) out patient. Began infusion therapy in September and reports improvement in COPD symptoms since that time. Pulmonary arterial systolic pressure is 61 mmHg. - duonebs q6 hours prn  - continue Advair and spiriva daily or appropriate substitution per pharmacy   - albuterol nebs prn   - continue home azithromycin M, W, F  - consider pulm consult (well known by pulmonologist here)    - BIPAP at night and PRN, continue in-patient    ROBERTO-  Pt with Cr to 1.34 on admission, baseline 0.98.     - defer diuresis to cardiology    Insulin dependent type 2 diabetes & morbid obesity- on lantus 45u pm and novolog SSI at home. Last A1C 8.4 in April 2020. Pt on long term prednisone for COPD. Received solumedrol prior to cath. Initial blood glucose 280 today.    - increase Lantus to 55 units daily (25 in am and 20 at bedtime)  - SSI   - Accuchecks ACHS   - Diabetic diet   - Diabetic educator seen yesterday, appreciate recs     Hypertension  - Continue Lisinopril 20mg BID    GERD: generally well managed, usually asymptomatic.   -Continue protonix 40mg daily     Allergic rhinitis    - continue flonase     Diet Diabetic diet   DVT Prophylaxis SQH   GI Prophylaxis Protonix   Code status FULL   Disposition Home      Point of Contact Debra Mcghee  Relationship: Daughter  [phone]       Ean Domingo MD , PGY-1   [address] [facility] Medicine   Intern Pager: [phone]   December 18, 2020, 5:24 PM            Senior Resident History and Physical  [facility] Medicine     HPI:     Elaine Goldberg is a 63 y.o. female with PMH of HFpEF, COPD on 3LNC home 02, T2DM, HTN, SHERRIE on Triology Avaps/BiPAP, GERD, Allergic Rhinitis, now with complaint of SOB.     PFM called by Dr. Hillman Claude after patient developed worsening SOB prior to scheduled right and left cath lab. Symptoms thought to be due to fluid overload and that she would benefit from admission, diuresis, and postponing procedure to Monday. She was given 40mg IV lasix, which helped her breathing. Of note, patient was also pre-medicated with Solumedrol 125mg IV x1 since she has a contrast allergy. Steroids did not help as much as the lasix according to report.      Patient reports a 2 week history of worsening shortness of breath described as \"feels like I'm drowning\", which is different from her usual COPD exacerbations. SOB associated with wheezing, chest tightness, 4-5 lb weight gain in 2 days, and leg swelling. Denies headache, blurry vision, fever/chills, cough.      Because patient was getting cath today, she did not take her Lasix the night before as she was following pre-procedure protocol.  She also states that she has not been able to lie flat in her bed so she has been sleeping in her chair.      Denies sick contacts or COVID exposure.      ED course, ROS, PMH, medications, labs and imaging per intern note above.     PMH, PSH, Family Hx, Social Hx, Home medications, and allergies as above in intern H&P.         Physical Exam:      Visit Vitals  /60 (BP 1 Location: Left arm, BP Patient Position: At rest)   Pulse 84   Resp 22   Ht 5' 3\" (1.6 m)   Wt 120.2 kg (265 lb)   SpO2 97%   Breastfeeding No   BMI 46.94 kg/m²         Physical Exam:  Physical Exam  Constitutional:       Comments: Alert, responsive, in mild distress   HENT:      Head: Atraumatic. Mouth/Throat:      Mouth: Mucous membranes are moist.   Cardiovascular:      Rate and Rhythm: Normal rate. Pulses: Normal pulses. Heart sounds: Normal heart sounds. No murmur. No gallop. Pulmonary:      Comments:  On 3L NC in mild distress, speaking 4-5 words per breath at times with some increase work of breathing; scant wheezing in anterior right chest, decreased breath sounds, no crackles or rhonchi    Abdominal:      General: Bowel sounds are normal. There is no distension. Palpations: Abdomen is soft. Comments: Large umbilical hernia with TTP but no signs of incarceration or strangulation   Musculoskeletal:      Right lower leg: Edema present. Left lower leg: Edema present. Skin:     General: Skin is warm and dry. Neurological:      General: No focal deficit present. Mental Status: She is oriented to person, place, and time. Motor: No weakness. Psychiatric:         Mood and Affect: Mood normal.         Labs Reviewed     Assessment/Plan:   63 y.o. female with PMH of HFpEF, COPD on 3LNC home 02, T2DM, HTN, SHERRIE on Triology Avaps/BiPAP w/pHTN, GERD, Allergic Rhinitis, now admitted for Acute CHF Exacerbation.     Respiratory Distress 2/2 Acute on Chronic CHF Exacerbation vs. COPD Exacerbation: Patient is in mild distress, but only requiring home 02 of 3L. Satting well %.  Most recent ECHO on5/24/20 showed EF of 65 - 70% with no regional wall motion abnormality. Moderate (grade 2) left ventricular diastolic dysfunction. Mildly dilated left atrium. Pulmonary hypertension. Pulmonary arterial systolic pressure is 61 mmHg. Severely elevated central venous pressure (15+ mmHg); IVC diameter is larger than 21 mm and collapses less than 50% with respiration.              - Admit to telemetry with cardiac monitoring              - Cardiology following, appreciate recs              - Consider Pulm Consult given COPD history and pending progress with diuresis               - Start BiPap prn              - Continue home 3L 02, adjust as needed              - CXR, EKG, ABG              - BNP, CBC, BMP              - Diuresis per cardiology, given ROBERTO              - Hold home Lasix 40mg in AM and 20mg PM               - Continue home Flovent 1 puff BID              - Continue home Spiriva 2 puffs daily              - Continue home Advair 2 puffs BID or hospital formulary              - Continue home Duonebs q4prn              - Start Solumedrol 40mg IV TID              - Strict I's and 0's              - Daily standing weights              - Daily CBC and BMP               - CM/PT/OT Consult     ROBERTO: Patient's creatinine elevated to 1.34. Baseline of 0.8-0.9.              - Diuresis per cardiology              - Daily BMP to closely monitor creatinine              - Caution with fluids given respiratory status from overload     Hyperglycemia in the setting of T2DM: A1c at 8.4 in 11/2020. Patient is on long-term prednisone for COPD and received Solumedrol prior to cath so sugars are on the higher side. Initial POC sugar at 280 and BMP glucose of 376. - Increase home Lantus 45U to 55 U (25U in AM and 20U in PM) given increased doses of steroids              - SSI     HTN: On admission, BPs initially elevated to 140s-150s/50s-80s, but improved to 130s/60s likely from administration of Lasix.                - Continue home Lisinopril 20mg daily     Remainder of plan and management of chronic conditions per intern note above      Katherine Barraza MD, PGY-3  [address] [facility] Medicine  12/18/20 4:19 PM

## 2020-12-18 NOTE — ROUTINE PROCESS
1030 Cath holding summary     Patient escorted to cath holding from waiting area via wheelchair, alert and oriented x 4, voicing no complaints. Changed into gown and placed on monitor. NPO since MN. Lab results, med rec and H&P reviewed on chart. PIV x 2 inserted without difficulty. 1235 TRANSFER - IN REPORT:    [facility] on Anell Needle  being received from Cath Lab (unit) for change in patient condition(procedure cancelled at this time d/t patient unable to lay flat on table. )      Report consisted of patients Situation, Background, Assessment and   Recommendations(SBAR). Information from the following report(s) SBAR and Procedure Summary was reviewed with the receiving nurse. Opportunity for questions and clarification was provided. Assessment completed upon patients arrival to unit and care assumed. 1525 Bedside and Verbal shift change report given to Hilda Morrow RN  (oncoming nurse) by Trudi Shrestha RN  (offgoing nurse).  Report included the following information SBAR, Kardex, Intake/Output, MAR, Recent Results and Pre Procedure Checklist.

## 2020-12-18 NOTE — Clinical Note
TRANSFER - IN REPORT:     Verbal report received from: Scott Seth RN. Report consisted of patient's Situation, Background, Assessment and   Recommendations(SBAR). Opportunity for questions and clarification was provided. Assessment completed upon patient's arrival to unit and care assumed. Patient transported with a Cardiac Cath Tech / Patient Care Tech.

## 2020-12-18 NOTE — H&P
H&P update    Patient with chronic diastolic CHF.   Unable to proceed with LHC/RHC due to acute SOB- likely from acute CHF  Needs admission

## 2020-12-19 LAB
ANION GAP SERPL CALC-SCNC: 8 MMOL/L (ref 3–18)
ATRIAL RATE: 91 BPM
BUN SERPL-MCNC: 25 MG/DL (ref 7–18)
BUN/CREAT SERPL: 19 (ref 12–20)
CALCIUM SERPL-MCNC: 9.7 MG/DL (ref 8.5–10.1)
CALCULATED P AXIS, ECG09: 68 DEGREES
CALCULATED R AXIS, ECG10: 56 DEGREES
CALCULATED T AXIS, ECG11: 32 DEGREES
CHLORIDE SERPL-SCNC: 100 MMOL/L (ref 100–111)
CO2 SERPL-SCNC: 28 MMOL/L (ref 21–32)
CREAT SERPL-MCNC: 1.33 MG/DL (ref 0.6–1.3)
DIAGNOSIS, 93000: NORMAL
ERYTHROCYTE [DISTWIDTH] IN BLOOD BY AUTOMATED COUNT: 13.8 % (ref 11.6–14.5)
GLUCOSE BLD STRIP.AUTO-MCNC: 305 MG/DL (ref 70–110)
GLUCOSE BLD STRIP.AUTO-MCNC: 316 MG/DL (ref 70–110)
GLUCOSE BLD STRIP.AUTO-MCNC: 317 MG/DL (ref 70–110)
GLUCOSE BLD STRIP.AUTO-MCNC: 335 MG/DL (ref 70–110)
GLUCOSE SERPL-MCNC: 323 MG/DL (ref 74–99)
HCT VFR BLD AUTO: 39.8 % (ref 35–45)
HGB BLD-MCNC: 13 G/DL (ref 12–16)
MCH RBC QN AUTO: 27.8 PG (ref 24–34)
MCHC RBC AUTO-ENTMCNC: 32.7 G/DL (ref 31–37)
MCV RBC AUTO: 85.2 FL (ref 74–97)
P-R INTERVAL, ECG05: 142 MS
PLATELET # BLD AUTO: 371 K/UL (ref 135–420)
PMV BLD AUTO: 9.5 FL (ref 9.2–11.8)
POTASSIUM SERPL-SCNC: 4.5 MMOL/L (ref 3.5–5.5)
Q-T INTERVAL, ECG07: 378 MS
QRS DURATION, ECG06: 130 MS
QTC CALCULATION (BEZET), ECG08: 464 MS
RBC # BLD AUTO: 4.67 M/UL (ref 4.2–5.3)
SODIUM SERPL-SCNC: 136 MMOL/L (ref 136–145)
VENTRICULAR RATE, ECG03: 91 BPM
WBC # BLD AUTO: 11.4 K/UL (ref 4.6–13.2)

## 2020-12-19 PROCEDURE — 74011250637 HC RX REV CODE- 250/637: Performed by: STUDENT IN AN ORGANIZED HEALTH CARE EDUCATION/TRAINING PROGRAM

## 2020-12-19 PROCEDURE — 74011636637 HC RX REV CODE- 636/637: Performed by: STUDENT IN AN ORGANIZED HEALTH CARE EDUCATION/TRAINING PROGRAM

## 2020-12-19 PROCEDURE — 94640 AIRWAY INHALATION TREATMENT: CPT

## 2020-12-19 PROCEDURE — 85027 COMPLETE CBC AUTOMATED: CPT

## 2020-12-19 PROCEDURE — 99233 SBSQ HOSP IP/OBS HIGH 50: CPT | Performed by: INTERNAL MEDICINE

## 2020-12-19 PROCEDURE — 36415 COLL VENOUS BLD VENIPUNCTURE: CPT

## 2020-12-19 PROCEDURE — 74011636637 HC RX REV CODE- 636/637: Performed by: INTERNAL MEDICINE

## 2020-12-19 PROCEDURE — 97530 THERAPEUTIC ACTIVITIES: CPT

## 2020-12-19 PROCEDURE — 74011000250 HC RX REV CODE- 250: Performed by: STUDENT IN AN ORGANIZED HEALTH CARE EDUCATION/TRAINING PROGRAM

## 2020-12-19 PROCEDURE — 74011250636 HC RX REV CODE- 250/636: Performed by: STUDENT IN AN ORGANIZED HEALTH CARE EDUCATION/TRAINING PROGRAM

## 2020-12-19 PROCEDURE — 80048 BASIC METABOLIC PNL TOTAL CA: CPT

## 2020-12-19 PROCEDURE — 97161 PT EVAL LOW COMPLEX 20 MIN: CPT

## 2020-12-19 PROCEDURE — 77010033678 HC OXYGEN DAILY

## 2020-12-19 PROCEDURE — 2709999900 HC NON-CHARGEABLE SUPPLY

## 2020-12-19 PROCEDURE — 97165 OT EVAL LOW COMPLEX 30 MIN: CPT

## 2020-12-19 PROCEDURE — 65660000000 HC RM CCU STEPDOWN

## 2020-12-19 PROCEDURE — 82962 GLUCOSE BLOOD TEST: CPT

## 2020-12-19 RX ORDER — INSULIN GLARGINE 100 [IU]/ML
20 INJECTION, SOLUTION SUBCUTANEOUS DAILY
Status: DISCONTINUED | OUTPATIENT
Start: 2020-12-19 | End: 2020-12-22 | Stop reason: HOSPADM

## 2020-12-19 RX ORDER — FUROSEMIDE 10 MG/ML
40 INJECTION INTRAMUSCULAR; INTRAVENOUS DAILY
Status: DISCONTINUED | OUTPATIENT
Start: 2020-12-20 | End: 2020-12-22

## 2020-12-19 RX ADMIN — HEPARIN SODIUM 5000 UNITS: 5000 INJECTION INTRAVENOUS; SUBCUTANEOUS at 03:13

## 2020-12-19 RX ADMIN — Medication 10 ML: at 16:04

## 2020-12-19 RX ADMIN — INSULIN GLARGINE 30 UNITS: 100 INJECTION, SOLUTION SUBCUTANEOUS at 21:43

## 2020-12-19 RX ADMIN — LISINOPRIL 20 MG: 20 TABLET ORAL at 09:35

## 2020-12-19 RX ADMIN — METHYLPREDNISOLONE SODIUM SUCCINATE 40 MG: 40 INJECTION, POWDER, FOR SOLUTION INTRAMUSCULAR; INTRAVENOUS at 09:34

## 2020-12-19 RX ADMIN — Medication 10 ML: at 06:58

## 2020-12-19 RX ADMIN — ARFORMOTEROL TARTRATE 15 MCG: 15 SOLUTION RESPIRATORY (INHALATION) at 19:31

## 2020-12-19 RX ADMIN — Medication 10 ML: at 21:50

## 2020-12-19 RX ADMIN — FLUTICASONE PROPIONATE 2 SPRAY: 50 SPRAY, METERED NASAL at 13:00

## 2020-12-19 RX ADMIN — METHYLPREDNISOLONE SODIUM SUCCINATE 20 MG: 40 INJECTION, POWDER, FOR SOLUTION INTRAMUSCULAR; INTRAVENOUS at 17:33

## 2020-12-19 RX ADMIN — PANTOPRAZOLE SODIUM 40 MG: 40 TABLET, DELAYED RELEASE ORAL at 06:49

## 2020-12-19 RX ADMIN — IPRATROPIUM BROMIDE 0.5 MG: 0.5 SOLUTION RESPIRATORY (INHALATION) at 07:30

## 2020-12-19 RX ADMIN — HEPARIN SODIUM 5000 UNITS: 5000 INJECTION INTRAVENOUS; SUBCUTANEOUS at 11:58

## 2020-12-19 RX ADMIN — INSULIN LISPRO 12 UNITS: 100 INJECTION, SOLUTION INTRAVENOUS; SUBCUTANEOUS at 21:43

## 2020-12-19 RX ADMIN — IPRATROPIUM BROMIDE 0.5 MG: 0.5 SOLUTION RESPIRATORY (INHALATION) at 16:04

## 2020-12-19 RX ADMIN — BUDESONIDE 1000 MCG: 0.5 SUSPENSION RESPIRATORY (INHALATION) at 19:31

## 2020-12-19 RX ADMIN — FUROSEMIDE 40 MG: 10 INJECTION, SOLUTION INTRAMUSCULAR; INTRAVENOUS at 09:34

## 2020-12-19 RX ADMIN — INSULIN LISPRO 12 UNITS: 100 INJECTION, SOLUTION INTRAVENOUS; SUBCUTANEOUS at 12:52

## 2020-12-19 RX ADMIN — ARFORMOTEROL TARTRATE 15 MCG: 15 SOLUTION RESPIRATORY (INHALATION) at 07:29

## 2020-12-19 RX ADMIN — BUDESONIDE 500 MCG: 0.5 SUSPENSION RESPIRATORY (INHALATION) at 07:29

## 2020-12-19 RX ADMIN — INSULIN LISPRO 12 UNITS: 100 INJECTION, SOLUTION INTRAVENOUS; SUBCUTANEOUS at 17:33

## 2020-12-19 RX ADMIN — INSULIN GLARGINE 20 UNITS: 100 INJECTION, SOLUTION SUBCUTANEOUS at 11:54

## 2020-12-19 RX ADMIN — INSULIN LISPRO 12 UNITS: 100 INJECTION, SOLUTION INTRAVENOUS; SUBCUTANEOUS at 09:34

## 2020-12-19 RX ADMIN — HEPARIN SODIUM 5000 UNITS: 5000 INJECTION INTRAVENOUS; SUBCUTANEOUS at 20:12

## 2020-12-19 RX ADMIN — Medication 81 MG: at 09:35

## 2020-12-19 RX ADMIN — MONTELUKAST SODIUM 10 MG: 10 TABLET, COATED ORAL at 21:44

## 2020-12-19 NOTE — PROGRESS NOTES
Bedside shift change report given to [address] (oncoming nurse) by Cici Bailon  (offgoing nurse). Report included the following information SBAR, Intake/Output, MAR, Recent Results and Cardiac Rhythm Sinus Rhythm.

## 2020-12-19 NOTE — PROGRESS NOTES
Problem: Self Care Deficits Care Plan (Adult)  Goal: *Therapy Goal (Edit Goal, Insert Text)  Outcome: Resolved/Met    OCCUPATIONAL THERAPY EVALUATION/DISCHARGE    Patient: Kong Selby (59 y.o. female)  Date: 12/19/2020  Primary Diagnosis: Pulmonary HTN (HCC) [I27.20]  Congestive heart failure (CHF) (HCC) [I50.9]  Heart failure (HCC) [I50.9]  CHF (congestive heart failure) ([facility] [address] [I50.9]  Procedure(s) (LRB):  LEFT AND RIGHT HEART CATH (N/A) 1 Day Post-Op   Precautions: Other (comment), Fall(O2, cardiac)  PLOF: she reports she was modified independent with her self care and she followed all of her training from OT when she was here last and found it very helpful    ASSESSMENT AND RECOMMENDATIONS:  Based on the objective data described below, the patient presents with out functional deficits; therefore, Skilled occupational therapy is not indicated at this time. Discharge Recommendations: None  Further Equipment Recommendations for Discharge: N/A      SUBJECTIVE:   Patient stated I use that sock thing that you gave me.     OBJECTIVE DATA SUMMARY:     Past Medical History:   Diagnosis Date    Asthma     Chronic lung disease     COPD     Cystocele, midline     Diabetes mellitus ([address]     GERD (gastroesophageal reflux disease)     Hidradenitis suppurativa     Hyperlipidemia     Hypertension     SHERRIE on CPAP     CPAP    Stress incontinence      Past Surgical History:   Procedure Laterality Date    BREAST SURGERY PROCEDURE UNLISTED      Right breast benign tumor removal    HX APPENDECTOMY      HX CHOLECYSTECTOMY      HX DILATION AND CURETTAGE      Dysfunctional uterine bleeding, thought 2/2 fibroids    HX TUBAL LIGATION       Barriers to Learning/Limitations: none  Home Situation:   Home Situation  Home Environment: Other (comment)(Duplex)  One/Two Story Residence: Two story  # of Interior Steps: 14  Living Alone: No  Support Systems: Child(kim), Home care staff  Patient Expects to be Discharged to[de-identified] Private residence  Current DME Used/Available at Home: Wheelchair, Walker, rolling, Oxygen, portable  [x]     Right hand dominant   []     Left hand dominant    Cognitive/Behavioral Status:  Neurologic State: Alert  Orientation Level: Oriented X4    Skin: no skin integrity issues noted during OT evaluation  Edema: no UE edema noted    Vision/Perceptual:      Tracking is [facility]       Coordination: BUE  Coordination: Within functional limits            Balance:  Sitting: independent sitting and reaching feet for LB dressing/bathing tasks  Standing: Independent standing for LB clothes management tasks  Strength: BUE  4/5 BUE's    Tone & Sensation: BUE  WFL  Range of Motion: BUE  AROM: Within functional limits     Functional Mobility and Transfers for ADLs:  Bed Mobility:     Supine to Sit: Independent  Sit to Supine: Independent     Transfers:  Sit to Stand: Modified independent  Stand to Sit: Modified independent  Stand Pivot Transfers: independent on and off raised commode     ADL Assessment:   Self feeding: independent  Grooming: independent  UB bathing/dressing: independent  LB bathing/dressing: modified independent (max effort this date and reports using a sock aid at home)  Therapeutic Exercise:  Review of scapular strengthening program  Pain:  Pain level pre-treatment: 0/10   Pain level post-treatment: 0/10   Activity Tolerance:   She is on BiPap throughout session and is not SOB  Please refer to the flowsheet for vital signs taken during this treatment. After treatment:   []  Patient left in no apparent distress sitting up in chair  [x]  Patient left in no apparent distress sitting on the edge of the bed  []  Call bell left within reach  []  Nursing notified  []  Caregiver present  []  Bed alarm activated    COMMUNICATION/EDUCATION:   []      Role of Occupational Therapy in the acute care setting  []      Home safety education was provided and the patient/caregiver indicated understanding.   [x] Patient/family have participated as able and agree with findings and recommendations. []      Patient is unable to participate in plan of care at this time. Thank you for this referral.  Jason Goel OTR/L  Time Calculation: 11 mins      Eval Complexity: History: LOW Complexity : Brief history review ; Examination: LOW Complexity : 1-3 performance deficits relating to physical, cognitive , or psychosocial skils that result in activity limitations and / or participation restrictions ;    Decision Making:LOW Complexity : No comorbidities that affect functional and no verbal or physical assistance needed to complete eval tasks

## 2020-12-19 NOTE — PROGRESS NOTES
Problem: Mobility Impaired (Adult and Pediatric)  Goal: *Acute Goals and Plan of Care (Insert Text)  Outcome: Resolved/Met   PHYSICAL THERAPY EVALUATION AND DISCHARGE    Patient: Chetna Pimentel (73 y.o. female)  Date: 12/19/2020  Primary Diagnosis: Pulmonary HTN (HCC) [I27.20]  Congestive heart failure (CHF) ([facility]) [I50.9]  Heart failure (HCC) [I50.9]  CHF (congestive heart failure) ([facility] [address] [I50.9]  Procedure(s) (LRB):  LEFT AND RIGHT HEART CATH (N/A) 1 Day Post-Op   Precautions: Other (comment), Fall(O2, cardiac)     PLOF: lives in duplex with family and has a M-F aide 9-3. Uses Walker and for longer distances uses wheelchair    ASSESSMENT :  Based on the objective data described below, the patient presents to skilled Physical Therapy with reports of feeling better since she was admitted to the hospital and given lasix to help with fluid overload. She notes she is ambulating in the room on her own to the bathroom and at night time transferring to the [facility] without assistance all while using her nasal O2. She demonstrated safe ambulation in the room to the doorway and back with use of her nasal O2 and with I/Supervision. Sit to stand to sit is mod I She is safe and with no LOB and did not use AD. She reports I transfers and mobility in the room without need of assistance and denies issues with balance or safety. Patient does not require further skilled intervention at this level of care. PLAN :  Recommendations and Planned Interventions:    No formal PT needs identified at this time. Discharge Recommendations: Home Health  Further Equipment Recommendations for Discharge: N/A     SUBJECTIVE:   Patient stated I am feeling much better.     OBJECTIVE DATA SUMMARY:     Past Medical History:   Diagnosis Date    Asthma     Chronic lung disease     COPD     Cystocele, midline     Diabetes mellitus ([facility] [address]     GERD (gastroesophageal reflux disease)     Hidradenitis suppurativa     Hyperlipidemia  Hypertension     SHERRIE on CPAP     CPAP    Stress incontinence      Past Surgical History:   Procedure Laterality Date    BREAST SURGERY PROCEDURE UNLISTED      Right breast benign tumor removal    HX APPENDECTOMY      HX CHOLECYSTECTOMY      HX DILATION AND CURETTAGE      Dysfunctional uterine bleeding, thought 2/2 fibroids    HX TUBAL LIGATION       Barriers to Learning/Limitations: None  Compensate with: N/A  Home Situation:   Home Situation  Home Environment: Other (comment)(Duplex)  One/Two Story Residence: Two story  # of Interior Steps: 14  Living Alone: No  Support Systems: Child(kim), Home care staff  Patient Expects to be Discharged to[de-identified] Private residence  Current DME Used/Available at Home: Wheelchair, Walker, rolling, Oxygen, portable  Critical Behavior:  Neurologic State: Alert  Orientation Level: Oriented X4  Cognition: Follows commands; Appropriate decision making; Appropriate for age attention/concentration; Appropriate safety awareness  Safety/Judgement: Fall prevention  Psychosocial  Patient Behaviors: Calm; Cooperative  Purposeful Interaction: Yes     Strength:    Strength: Within functional limits     Tone & Sensation:   Tone: Normal     Sensation: Intact     Range Of Motion:  AROM: Within functional limits     Posture:     Functional Mobility:  Bed Mobility:     Supine to Sit: Independent  Sit to Supine: Independent     Transfers:  Sit to Stand: Modified independent  Stand to Sit: Modified independent  Stand Pivot Transfers: (reports I  to [facility])     Bed to Chair: (reports I in the room )     Balance:   Sitting: Intact; Without support  Standing: Intact; Without support  Wheelchair Mobility:     Ambulation/Gait Training:  Distance (ft): 20 Feet (ft)  Assistive Device: (nasal O2)  Ambulation - Level of Assistance: Independent;Supervision(supervision for consult, reports I in the room with )2)     Gait Description (WDL): Exceptions to [facility]     Base of Support: Widened     Speed/Pam: Delayed  Step Length: Left shortened;Right shortened     Stairs: Therapeutic Exercises:     Pain:  Pain level pre-treatment: 6/10   Pain level post-treatment: 6/10  Pain Intervention(s): Medication (see MAR); Rest, Ice, Repositioning    Response to intervention: Nurse notified, See doc flow     Activity Tolerance: Tolerated well. Please refer to the flowsheet for vital signs taken during this treatment. After treatment:   []         Patient left in no apparent distress sitting up in chair  [x]         Patient left in no apparent distress in bed----- sitting on the EOB  [x]         Call bell left within reach  []         Nursing notified  []         Caregiver present  []         Bed alarm activated  []         SCDs applied    COMMUNICATION/EDUCATION:   [x]         Role of Physical Therapy in the acute care setting. [x]         Fall prevention education was provided and the patient/caregiver indicated understanding. [x]         Patient/family have participated as able in goal setting and plan of care. []         Patient/family agree to work toward stated goals and plan of care. []         Patient understands intent and goals of therapy, but is neutral about his/her participation. []         Patient is unable to participate in goal setting/plan of care: ongoing with therapy staff.  []         Other:     Thank you for this referral.  Benigno Wasserman, PT   Time Calculation: 23 mins      Eval Complexity: History: HIGH Complexity :3+ comorbidities / personal factors will impact the outcome/ POC Exam:MEDIUM Complexity : 3 Standardized tests and measures addressing body structure, function, activity limitation and / or participation in recreation  Presentation: LOW Complexity : Stable, uncomplicated  Clinical Decision Making:Low Complexity level of assistance with functional mobility  Overall Complexity:LOW

## 2020-12-19 NOTE — PROGRESS NOTES
Problem: Falls - Risk of  Goal: *Absence of Falls  Description: Document Umu Dhaliwal Fall Risk and appropriate interventions in the flowsheet. Outcome: Progressing Towards Goal  Note: Fall Risk Interventions:  Mobility Interventions: Assess mobility with egress test    Medication Interventions: Evaluate medications/consider consulting pharmacy, Teach patient to arise slowly    History of Falls Interventions: Door open when patient unattended, Evaluate medications/consider consulting pharmacy         Problem: Patient Education: Go to Patient Education Activity  Goal: Patient/Family Education  Outcome: Progressing Towards Goal     Problem: Diabetes Self-Management  Goal: *Disease process and treatment process  Description: Define diabetes and identify own type of diabetes; list 3 options for treating diabetes. Outcome: Progressing Towards Goal  Goal: *Incorporating nutritional management into lifestyle  Description: Describe effect of type, amount and timing of food on blood glucose; list 3 methods for planning meals. Outcome: Progressing Towards Goal  Goal: *Using medications safely  Description: State effect of diabetes medications on diabetes; name diabetes medication taking, action and side effects. Outcome: Progressing Towards Goal  Goal: *Monitoring blood glucose, interpreting and using results  Description: Identify recommended blood glucose targets  and personal targets. Outcome: Progressing Towards Goal  Goal: *Prevention, detection, treatment of acute complications  Description: List symptoms of hyper- and hypoglycemia; describe how to treat low blood sugar and actions for lowering  high blood glucose level.   Outcome: Progressing Towards Goal

## 2020-12-19 NOTE — ROUTINE PROCESS
TRANSFER - OUT REPORT:    Verbal report given to Jie Sexton RN(name) on Dara Lyon  being transferred to [facility](unit) for routine progression of care       Report consisted of patients Situation, Background, Assessment and   Recommendations(SBAR). Information from the following report(s) SBAR was reviewed with the receiving nurse. Lines:   Peripheral IV 12/18/20 Right Forearm (Active)   Site Assessment Clean, dry, & intact 12/18/20 1059   Phlebitis Assessment 0 12/18/20 1059   Infiltration Assessment 0 12/18/20 1059   Dressing Status Clean, dry, & intact 12/18/20 1059   Dressing Type Transparent 12/18/20 1059   Hub Color/Line Status Blue;Patent; Flushed 12/18/20 1059       Peripheral IV 12/18/20 Left Forearm (Active)   Site Assessment Clean, dry, & intact 12/18/20 1059   Phlebitis Assessment 0 12/18/20 1059   Infiltration Assessment 0 12/18/20 1059   Dressing Status Clean, dry, & intact 12/18/20 1059   Dressing Type Transparent 12/18/20 1059   Hub Color/Line Status Pink;Flushed;Patent 12/18/20 1059        Opportunity for questions and clarification was provided. Patient transported with:   Registered Nurse: PEYTON Theodore

## 2020-12-19 NOTE — PROGRESS NOTES
[facility]  Intern Progress Note    Patient: Dara Lyon MRN: [id number]   SSN: [id number]  YOB: 1960   Age: 63 y.o. Sex: female      Admit Date: 12/18/2020    LOS: 1 day   No chief complaint on file. Subjective:     Patient seen at bedside eating breakfast. She reported being able to breathe better this morning. She does feel the chest tightness she had during admission. ROS: Denies chest pain, headaches, abdominal pain, diarrhea/constipation. Objective:     Visit Vitals  BP (!) 140/91 (BP 1 Location: Right arm, BP Patient Position: At rest)   Pulse 85   Temp 97.6 °F (36.4 °C)   Resp 20   Ht 5' 3\" (1.6 m)   Wt 121 kg (266 lb 11.2 oz)   SpO2 97%   Breastfeeding No   BMI 47.24 kg/m²       Physical Exam:   General appearance: alert, cooperative, no distress, appears stated age  Lungs: clear to auscultation bilaterally  Heart: regular rate and rhythm, S1, S2 normal, no murmur, click, rub or gallop  Abdomen: soft, non-tender. Bowel sounds normal. No masses,  no organomegaly  Pulses: 2+ and symmetric  Skin: Skin color, texture, turgor normal. No rashes or lesions  Neuro:  normal without focal findings  mental status, speech normal, alert and oriented x iii  NATASHA  reflexes normal and symmetric  Exremities: Mild trace edema    Intake and Output:  Current Shift: No intake/output data recorded.   Last three shifts: 12/17 1901 - 12/19 0700  In: 591 [P.O.:591]  Out: 1000 [Urine:1000]    Lab/Data Review:  Recent Results (from the past 12 hour(s))   GLUCOSE, POC    Collection Time: 12/18/20  9:10 PM   Result Value Ref Range    Glucose (POC) 311 (H) 70 - 868 mg/dL   METABOLIC PANEL, BASIC    Collection Time: 12/19/20  1:38 AM   Result Value Ref Range    Sodium 136 136 - 145 mmol/L    Potassium 4.5 3.5 - 5.5 mmol/L    Chloride 100 100 - 111 mmol/L    CO2 28 21 - 32 mmol/L    Anion gap 8 3.0 - 18 mmol/L    Glucose 323 (H) 74 - 99 mg/dL    BUN 25 (H) 7.0 - 18 MG/DL Creatinine 1.33 (H) 0.6 - 1.3 MG/DL    BUN/Creatinine ratio 19 12 - 20      GFR est AA 49 (L) >60 ml/min/1.73m2    GFR est non-AA 41 (L) >60 ml/min/1.73m2    Calcium 9.7 8.5 - 10.1 MG/DL   CBC W/O DIFF    Collection Time: 12/19/20  1:38 AM   Result Value Ref Range    WBC 11.4 4.6 - 13.2 K/uL    RBC 4.67 4.20 - 5.30 M/uL    HGB 13.0 12.0 - 16.0 g/dL    HCT 39.8 35.0 - 45.0 %    MCV 85.2 74.0 - 97.0 FL    MCH 27.8 24.0 - 34.0 PG    MCHC 32.7 31.0 - 37.0 g/dL    RDW 13.8 11.6 - 14.5 %    PLATELET 969 131 - 921 K/uL    MPV 9.5 9.2 - 11.8 FL   GLUCOSE, POC    Collection Time: 12/19/20  6:06 AM   Result Value Ref Range    Glucose (POC) 335 (H) 70 - 110 mg/dL         Assessment and Plan:   63 y.o. female with PMH of COPD on home O2 (3L NC), IDDM2, HTN, HFpEF, SHERRIE on CPAP, GERD, allergic rhinitis now admitted with CHF exacerbation.     Respiratory distress 2/2 acute on chronic HFpEF & - Pt presented for cath procedure today and found to be too dyspneic to lay flat on the table and additionally to have significant anasarca. SOB gradually worsening for last several weeks with different quality that typical COPD exacerbation. Patient is in mild distress, but only requiring home 02 of 3L. Satting well %. Significant anasarca on exam. ECHO (5/24/20) RV 39 - 70%. No regional wall motion abnormality. Moderate (grade 2) left ventricular diastolic dysfunction. Mildly dilated left atrium.  Pulmonary arterial systolic pressure is 61 mmHg.  NT pro-BNP  - Admitting to tele with continuous cardiac monitoring  - cardiology following  - ABG, CXR, EKG  - Continue IV Lasix 40 mg BID  - defer dieresis to cardiology    - start BiPap PRN during day  - daily standing weights  - Daily CBC, BMP   - continue home supplemental O2 (3L) with goal of 88-92%  - start solumedrol 40 TID tomorrow for 2 days  - restart home prednisone 5mg daily in 2-3 days   - SQH  - Vital signs per unit routine  - Strict I/Os  - PT/OT/CM     COPD, pulmonary hypertension & SHERRIE- Patient has COPD on max therapy w/ strong asthma component. Hospitalized 6x in 2020 for COPD exacerbations. Followed by pulm (Dr. Leonardo Mitchell) out patient. Began infusion therapy in September and reports improvement in COPD symptoms since that time. Pulmonary arterial systolic pressure is 61 mmHg. - duonebs q6 hours prn  - continue Advair and spiriva daily or appropriate substitution per pharmacy   - albuterol nebs prn   - continue home azithromycin M, W, F  - consider pulm consult (well known by pulmonologist here)               - BIPAP at night and PRN, continue in-patient     ROBERTO-  Pt with Cr to 1.34 on admission, baseline 0.98. Creatinine 1.33 on 12/19              - defer diuresis to cardiology     Insulin dependent type 2 diabetes & morbid obesity- on lantus 45u pm and novolog SSI at home. Last A1C 8.4 in April 2020. Pt on long term prednisone for COPD. Received solumedrol prior to cath. Initial blood glucose 280 today.    - increase Lantus to 55 units daily (25 in am and 20 at bedtime)  - SSI   - Accuchecks ACHS   - Diabetic diet   - Diabetic educator seen yesterday, appreciate recs      Hypertension  - Continue Lisinopril 20mg BID     GERD: generally well managed, usually asymptomatic.   -Continue protonix 40mg daily     Allergic rhinitis               - continue flonase     Diet Diabetic diet   DVT Prophylaxis SQH   GI Prophylaxis Protonix   Code status FULL   Disposition Home      Point of Contact Layla Castellon  Relationship: Daughter  [phone]        Thelma Aragon MD, PGY-1   [address]   Intern Pager: [phone]   December 19, 2020, 7:38 AM

## 2020-12-19 NOTE — PROGRESS NOTES
12/18/20 5395   Oxygen Therapy   O2 Sat (%) 97 %   Pulse via Oximetry 90 beats per minute   O2 Device BIPAP   FIO2 (%) 40 %   CPAP/BIPAP   CPAP/BIPAP Start/Stop On   Device Mode AVAP   AVAP Set VT (ml) 400   Mask Type and Size Full face; Medium   Skin Condition good   PIP Observed 14 cm H20   IPAP (cm H2O)   (10-25)   EPAP (cm H2O) 6 cm H2O   Inspiratory Time (sec) 1 seconds   Vt Spont (ml) 788 ml   Ve Observed (l/min) 14 l/min   Backup Rate 12   Total RR (Spontaneous) 18 breaths per minute   Insp Rise Time (sec) 3   Leak (Estimated) 50 L/min   Pt's Home Machine No   Biomedical Check Performed Yes   Settings Verified Yes   Alarm Settings   High Pressure 25   Low Pressure 5   Apnea 20   Low Ve 2   High Rate 40   Low Rate 6     Pt placed on AVAPS mode, per pt request.  No respiratory distress noted, patient is comfortable. Pt instructed to call if help is needed, call bell within reach.   Patient confirms understanding

## 2020-12-19 NOTE — PROGRESS NOTES
[facility]      CARDIOLOGY PROGRESS NOTE  RECS:  1. Shortness of breath - improved after diuresis with IV lasix, plan for cardiac cath on Monday if patient is stable. 2. Acute on chronic diastolic CHF - EF 77-20% by recent echo, NT pro BNP 50, Continue IV lasix, monitor I/O and BMP  3. ROBERTO  Monitor renal function with diuresis  4. COPD - continue antibiotics and supportive care per hospitalist team.  5. Pulmonary Hypertension - PAP 61 mm Hg by echo 5/2020 - continue CPAP at night. 6. Hypertension - controlled. 7. DM II - uncontrolled, A1C 9.0 - management per hospitalist, check fasting lipids in AM  8. RBBB - new on EKG    ASSESSMENT:  Hospital Problems  Date Reviewed: 8/19/2020          Codes Class Noted POA    * (Principal) Heart failure ([facility] [address] ICD-10-CM: I50.9  ICD-9-CM: 428.9  12/18/2020 Unknown        CHF (congestive heart failure) ([facility]) ICD-10-CM: I50.9  ICD-9-CM: 428.0  12/18/2020 Unknown                SUBJECTIVE:  No CP   SOB improved    OBJECTIVE:    VS:   Visit Vitals  /74 (BP 1 Location: Left arm, BP Patient Position: Sitting)   Pulse 89   Temp 97.4 °F (36.3 °C)   Resp 22   Ht 5' 3\" (1.6 m)   Wt 121 kg (266 lb 11.2 oz)   SpO2 91%   Breastfeeding No   BMI 47.24 kg/m²         Intake/Output Summary (Last 24 hours) at 12/19/2020 0924  Last data filed at 12/19/2020 0319  Gross per 24 hour   Intake 591 ml   Output 1000 ml   Net -409 ml     TELE: normal sinus rhythm    General: alert, in no apparent distress and obese  HENT: Normocephalic, atraumatic. Normal external eye.   Neck :  JVD difficult to assess due to obesity  Cardiac:  regular rate and rhythm, S1, S2 normal, no murmur, click, rub or gallop  Lungs: clear to auscultation bilaterally, diminished breath sounds B/L  Abdomen: Soft, nontender, no masses  Extremities:  1+ BLE edema, peripheral pulses present      Labs: Results:       Chemistry Recent Labs     12/19/20  0138 12/18/20  1730   * 376*    135*   K 4.5 4.6    100   CO2 28 29   BUN 25* 18   CREA 1.33* 1.34*   CA 9.7 9.7   AGAP 8 6   BUCR 19 13      CBC w/Diff Recent Labs     12/19/20  0138 12/18/20  1730   WBC 11.4 8.3   RBC 4.67 4.74   HGB 13.0 13.2   HCT 39.8 40.4    356      Cardiac Enzymes No results for input(s): CPK, CKND1, KESHIA in the last 72 hours. No lab exists for component: CKRMB, TROIP   Coagulation No results for input(s): PTP, INR, APTT, INREXT in the last 72 hours. Lipid Panel Lab Results   Component Value Date/Time    Cholesterol, total 220 (H) 11/20/2012 03:22 AM    HDL Cholesterol 62 (H) 11/20/2012 03:22 AM    LDL, calculated 144.6 (H) 11/20/2012 03:22 AM    VLDL, calculated 13.4 11/20/2012 03:22 AM    Triglyceride 67 11/20/2012 03:22 AM    CHOL/HDL Ratio 3.5 11/20/2012 03:22 AM      BNP No results for input(s): BNPP in the last 72 hours. Liver Enzymes No results for input(s): TP, ALB, TBIL, AP in the last 72 hours. No lab exists for component: SGOT, GPT, DBIL   Thyroid Studies Lab Results   Component Value Date/Time    TSH 2.29 12/09/2020 03:19 PM              Rosa Echavarria NP       I have independently evaluated taken history and examined the patient. All relevant labs and testing data's are reviewed. Care plan discussed and updated after review.   Joy Yanez MD

## 2020-12-19 NOTE — ROUTINE PROCESS
Pt arrived to floor at approx 1820 in no signs of distress. Pt was assessed at bedside by primary nurse and cath lab RN Juanjo Robbins. Bedside and Verbal shift change report given to JOSÉ MIGUEL Hidalgo (oncoming nurse) by Kevin Buck (offgoing nurse). Report included the following information SBAR, Kardex, Intake/Output and MAR.

## 2020-12-20 LAB
ANION GAP SERPL CALC-SCNC: 6 MMOL/L (ref 3–18)
BUN SERPL-MCNC: 28 MG/DL (ref 7–18)
BUN/CREAT SERPL: 23 (ref 12–20)
CALCIUM SERPL-MCNC: 9.9 MG/DL (ref 8.5–10.1)
CHLORIDE SERPL-SCNC: 99 MMOL/L (ref 100–111)
CHOLEST SERPL-MCNC: 260 MG/DL
CO2 SERPL-SCNC: 30 MMOL/L (ref 21–32)
CREAT SERPL-MCNC: 1.23 MG/DL (ref 0.6–1.3)
ERYTHROCYTE [DISTWIDTH] IN BLOOD BY AUTOMATED COUNT: 13.7 % (ref 11.6–14.5)
GLUCOSE BLD STRIP.AUTO-MCNC: 201 MG/DL (ref 70–110)
GLUCOSE BLD STRIP.AUTO-MCNC: 274 MG/DL (ref 70–110)
GLUCOSE BLD STRIP.AUTO-MCNC: 328 MG/DL (ref 70–110)
GLUCOSE BLD STRIP.AUTO-MCNC: 343 MG/DL (ref 70–110)
GLUCOSE BLD STRIP.AUTO-MCNC: 352 MG/DL (ref 70–110)
GLUCOSE SERPL-MCNC: 261 MG/DL (ref 74–99)
HCT VFR BLD AUTO: 38.3 % (ref 35–45)
HDLC SERPL-MCNC: 55 MG/DL (ref 40–60)
HDLC SERPL: 4.7 {RATIO} (ref 0–5)
HGB BLD-MCNC: 12.6 G/DL (ref 12–16)
LDLC SERPL CALC-MCNC: 146.8 MG/DL (ref 0–100)
LIPID PROFILE,FLP: ABNORMAL
MCH RBC QN AUTO: 28.1 PG (ref 24–34)
MCHC RBC AUTO-ENTMCNC: 32.9 G/DL (ref 31–37)
MCV RBC AUTO: 85.5 FL (ref 74–97)
PLATELET # BLD AUTO: 378 K/UL (ref 135–420)
PMV BLD AUTO: 9.7 FL (ref 9.2–11.8)
POTASSIUM SERPL-SCNC: 4 MMOL/L (ref 3.5–5.5)
RBC # BLD AUTO: 4.48 M/UL (ref 4.2–5.3)
SODIUM SERPL-SCNC: 135 MMOL/L (ref 136–145)
TRIGL SERPL-MCNC: 291 MG/DL (ref ?–150)
VLDLC SERPL CALC-MCNC: 58.2 MG/DL
WBC # BLD AUTO: 13.8 K/UL (ref 4.6–13.2)

## 2020-12-20 PROCEDURE — 74011250637 HC RX REV CODE- 250/637: Performed by: STUDENT IN AN ORGANIZED HEALTH CARE EDUCATION/TRAINING PROGRAM

## 2020-12-20 PROCEDURE — 74011000250 HC RX REV CODE- 250: Performed by: STUDENT IN AN ORGANIZED HEALTH CARE EDUCATION/TRAINING PROGRAM

## 2020-12-20 PROCEDURE — 74011250636 HC RX REV CODE- 250/636: Performed by: STUDENT IN AN ORGANIZED HEALTH CARE EDUCATION/TRAINING PROGRAM

## 2020-12-20 PROCEDURE — 74011250636 HC RX REV CODE- 250/636: Performed by: NURSE PRACTITIONER

## 2020-12-20 PROCEDURE — 65660000000 HC RM CCU STEPDOWN

## 2020-12-20 PROCEDURE — 82962 GLUCOSE BLOOD TEST: CPT

## 2020-12-20 PROCEDURE — 85027 COMPLETE CBC AUTOMATED: CPT

## 2020-12-20 PROCEDURE — 74011636637 HC RX REV CODE- 636/637: Performed by: NURSE PRACTITIONER

## 2020-12-20 PROCEDURE — 94640 AIRWAY INHALATION TREATMENT: CPT

## 2020-12-20 PROCEDURE — 80061 LIPID PANEL: CPT

## 2020-12-20 PROCEDURE — 74011636637 HC RX REV CODE- 636/637: Performed by: STUDENT IN AN ORGANIZED HEALTH CARE EDUCATION/TRAINING PROGRAM

## 2020-12-20 PROCEDURE — 74011636637 HC RX REV CODE- 636/637: Performed by: INTERNAL MEDICINE

## 2020-12-20 PROCEDURE — 2709999900 HC NON-CHARGEABLE SUPPLY

## 2020-12-20 PROCEDURE — 74011250637 HC RX REV CODE- 250/637: Performed by: NURSE PRACTITIONER

## 2020-12-20 PROCEDURE — 80048 BASIC METABOLIC PNL TOTAL CA: CPT

## 2020-12-20 PROCEDURE — 99232 SBSQ HOSP IP/OBS MODERATE 35: CPT | Performed by: INTERNAL MEDICINE

## 2020-12-20 PROCEDURE — 36415 COLL VENOUS BLD VENIPUNCTURE: CPT

## 2020-12-20 RX ORDER — DIPHENHYDRAMINE HCL 25 MG
25 CAPSULE ORAL 2 TIMES DAILY
Status: COMPLETED | OUTPATIENT
Start: 2020-12-20 | End: 2020-12-21

## 2020-12-20 RX ORDER — PREDNISONE 20 MG/1
40 TABLET ORAL 2 TIMES DAILY WITH MEALS
Status: COMPLETED | OUTPATIENT
Start: 2020-12-20 | End: 2020-12-21

## 2020-12-20 RX ADMIN — INSULIN GLARGINE 30 UNITS: 100 INJECTION, SOLUTION SUBCUTANEOUS at 22:23

## 2020-12-20 RX ADMIN — FLUTICASONE PROPIONATE 2 SPRAY: 50 SPRAY, METERED NASAL at 10:48

## 2020-12-20 RX ADMIN — INSULIN LISPRO 12 UNITS: 100 INJECTION, SOLUTION INTRAVENOUS; SUBCUTANEOUS at 16:48

## 2020-12-20 RX ADMIN — INSULIN GLARGINE 20 UNITS: 100 INJECTION, SOLUTION SUBCUTANEOUS at 09:05

## 2020-12-20 RX ADMIN — HEPARIN SODIUM 5000 UNITS: 5000 INJECTION INTRAVENOUS; SUBCUTANEOUS at 03:58

## 2020-12-20 RX ADMIN — MONTELUKAST SODIUM 10 MG: 10 TABLET, COATED ORAL at 22:22

## 2020-12-20 RX ADMIN — IPRATROPIUM BROMIDE 0.5 MG: 0.5 SOLUTION RESPIRATORY (INHALATION) at 00:50

## 2020-12-20 RX ADMIN — PREDNISONE 5 MG: 10 TABLET ORAL at 08:10

## 2020-12-20 RX ADMIN — Medication 10 ML: at 16:49

## 2020-12-20 RX ADMIN — DIPHENHYDRAMINE HYDROCHLORIDE 25 MG: 25 CAPSULE ORAL at 12:30

## 2020-12-20 RX ADMIN — Medication 10 ML: at 22:24

## 2020-12-20 RX ADMIN — DIPHENHYDRAMINE HYDROCHLORIDE 25 MG: 25 CAPSULE ORAL at 18:06

## 2020-12-20 RX ADMIN — INSULIN LISPRO 9 UNITS: 100 INJECTION, SOLUTION INTRAVENOUS; SUBCUTANEOUS at 12:29

## 2020-12-20 RX ADMIN — INSULIN LISPRO 15 UNITS: 100 INJECTION, SOLUTION INTRAVENOUS; SUBCUTANEOUS at 22:22

## 2020-12-20 RX ADMIN — ARFORMOTEROL TARTRATE 15 MCG: 15 SOLUTION RESPIRATORY (INHALATION) at 19:04

## 2020-12-20 RX ADMIN — Medication 81 MG: at 08:10

## 2020-12-20 RX ADMIN — ARFORMOTEROL TARTRATE 15 MCG: 15 SOLUTION RESPIRATORY (INHALATION) at 07:13

## 2020-12-20 RX ADMIN — IPRATROPIUM BROMIDE 0.5 MG: 0.5 SOLUTION RESPIRATORY (INHALATION) at 23:31

## 2020-12-20 RX ADMIN — PREDNISONE 40 MG: 20 TABLET ORAL at 16:48

## 2020-12-20 RX ADMIN — FUROSEMIDE 40 MG: 10 INJECTION, SOLUTION INTRAMUSCULAR; INTRAVENOUS at 09:07

## 2020-12-20 RX ADMIN — INSULIN LISPRO 6 UNITS: 100 INJECTION, SOLUTION INTRAVENOUS; SUBCUTANEOUS at 08:08

## 2020-12-20 RX ADMIN — PANTOPRAZOLE SODIUM 40 MG: 40 TABLET, DELAYED RELEASE ORAL at 08:09

## 2020-12-20 RX ADMIN — IPRATROPIUM BROMIDE 0.5 MG: 0.5 SOLUTION RESPIRATORY (INHALATION) at 07:14

## 2020-12-20 RX ADMIN — BUDESONIDE 1000 MCG: 0.5 SUSPENSION RESPIRATORY (INHALATION) at 19:04

## 2020-12-20 RX ADMIN — HEPARIN SODIUM 5000 UNITS: 5000 INJECTION INTRAVENOUS; SUBCUTANEOUS at 10:49

## 2020-12-20 RX ADMIN — BUDESONIDE 500 MCG: 0.5 SUSPENSION RESPIRATORY (INHALATION) at 07:13

## 2020-12-20 RX ADMIN — PREDNISONE 40 MG: 20 TABLET ORAL at 12:30

## 2020-12-20 RX ADMIN — LISINOPRIL 20 MG: 20 TABLET ORAL at 08:11

## 2020-12-20 RX ADMIN — IPRATROPIUM BROMIDE 0.5 MG: 0.5 SOLUTION RESPIRATORY (INHALATION) at 15:36

## 2020-12-20 RX ADMIN — HEPARIN SODIUM 5000 UNITS: 5000 INJECTION INTRAVENOUS; SUBCUTANEOUS at 19:09

## 2020-12-20 NOTE — PROGRESS NOTES
Problem: Falls - Risk of  Goal: *Absence of Falls  Description: Document Cathi Chand Fall Risk and appropriate interventions in the flowsheet. Outcome: Progressing Towards Goal  Note: Fall Risk Interventions:  Mobility Interventions: Assess mobility with egress test         Medication Interventions: Evaluate medications/consider consulting pharmacy         History of Falls Interventions: Door open when patient unattended, Evaluate medications/consider consulting pharmacy         Problem: Patient Education: Go to Patient Education Activity  Goal: Patient/Family Education  Outcome: Progressing Towards Goal     Problem: Moderate Sedation (Adult)  Goal: *Patent airway  Outcome: Progressing Towards Goal  Goal: *Adequate oxygenation  Outcome: Progressing Towards Goal  Goal: *Absence of aspiration  Outcome: Progressing Towards Goal  Goal: *Hemodynamically stable  Outcome: Progressing Towards Goal  Goal: *Optimal pain control at patient's stated goal  Outcome: Progressing Towards Goal  Goal: *Absence of nausea/vomiting  Outcome: Progressing Towards Goal  Goal: *Anxiety reduced or absent  Outcome: Progressing Towards Goal  Goal: *Absence of injury  Outcome: Progressing Towards Goal  Goal: *Level of consciousness returns to baseline  Outcome: Progressing Towards Goal  Goal: Interventions  Outcome: Progressing Towards Goal     Problem: Patient Education: Go to Patient Education Activity  Goal: Patient/Family Education  Outcome: Progressing Towards Goal     Problem: Diabetes Self-Management  Goal: *Disease process and treatment process  Description: Define diabetes and identify own type of diabetes; list 3 options for treating diabetes. Outcome: Progressing Towards Goal  Goal: *Incorporating nutritional management into lifestyle  Description: Describe effect of type, amount and timing of food on blood glucose; list 3 methods for planning meals.   Outcome: Progressing Towards Goal  Goal: *Incorporating physical activity into lifestyle  Description: State effect of exercise on blood glucose levels. Outcome: Progressing Towards Goal  Goal: *Developing strategies to promote health/change behavior  Description: Define the ABC's of diabetes; identify appropriate screenings, schedule and personal plan for screenings. Outcome: Progressing Towards Goal  Goal: *Using medications safely  Description: State effect of diabetes medications on diabetes; name diabetes medication taking, action and side effects. Outcome: Progressing Towards Goal  Goal: *Monitoring blood glucose, interpreting and using results  Description: Identify recommended blood glucose targets  and personal targets. Outcome: Progressing Towards Goal  Goal: *Prevention, detection, treatment of acute complications  Description: List symptoms of hyper- and hypoglycemia; describe how to treat low blood sugar and actions for lowering  high blood glucose level. Outcome: Progressing Towards Goal  Goal: *Prevention, detection and treatment of chronic complications  Description: Define the natural course of diabetes and describe the relationship of blood glucose levels to long term complications of diabetes.   Outcome: Progressing Towards Goal  Goal: *Developing strategies to address psychosocial issues  Description: Describe feelings about living with diabetes; identify support needed and support network  Outcome: Progressing Towards Goal  Goal: *Sick day guidelines  Outcome: Progressing Towards Goal  Goal: *Patient Specific Goal (EDIT GOAL, INSERT TEXT)  Outcome: Progressing Towards Goal     Problem: Patient Education: Go to Patient Education Activity  Goal: Patient/Family Education  Outcome: Progressing Towards Goal     Problem: Patient Education: Go to Patient Education Activity  Goal: Patient/Family Education  Outcome: Progressing Towards Goal     Problem: Heart Failure: Day 1  Goal: Off Pathway (Use only if patient is Off Pathway)  Outcome: Progressing Towards Goal  Goal: Activity/Safety  Outcome: Progressing Towards Goal  Goal: Consults, if ordered  Outcome: Progressing Towards Goal  Goal: Diagnostic Test/Procedures  Outcome: Progressing Towards Goal  Goal: Nutrition/Diet  Outcome: Progressing Towards Goal  Goal: Discharge Planning  Outcome: Progressing Towards Goal  Goal: Medications  Outcome: Progressing Towards Goal  Goal: Respiratory  Outcome: Progressing Towards Goal  Goal: Treatments/Interventions/Procedures  Outcome: Progressing Towards Goal  Goal: Psychosocial  Outcome: Progressing Towards Goal  Goal: *Oxygen saturation within defined limits  Outcome: Progressing Towards Goal  Goal: *Hemodynamically stable  Outcome: Progressing Towards Goal  Goal: *Optimal pain control at patient's stated goal  Outcome: Progressing Towards Goal  Goal: *Anxiety reduced or absent  Outcome: Progressing Towards Goal     Problem: Heart Failure: Day 2  Goal: Off Pathway (Use only if patient is Off Pathway)  Outcome: Progressing Towards Goal  Goal: Activity/Safety  Outcome: Progressing Towards Goal  Goal: Consults, if ordered  Outcome: Progressing Towards Goal  Goal: Diagnostic Test/Procedures  Outcome: Progressing Towards Goal  Goal: Nutrition/Diet  Outcome: Progressing Towards Goal  Goal: Discharge Planning  Outcome: Progressing Towards Goal  Goal: Medications  Outcome: Progressing Towards Goal  Goal: Respiratory  Outcome: Progressing Towards Goal  Goal: Treatments/Interventions/Procedures  Outcome: Progressing Towards Goal  Goal: Psychosocial  Outcome: Progressing Towards Goal  Goal: *Oxygen saturation within defined limits  Outcome: Progressing Towards Goal  Goal: *Hemodynamically stable  Outcome: Progressing Towards Goal  Goal: *Optimal pain control at patient's stated goal  Outcome: Progressing Towards Goal  Goal: *Anxiety reduced or absent  Outcome: Progressing Towards Goal  Goal: *Demonstrates progressive activity  Outcome: Progressing Towards Goal     Problem: Heart Failure: Day 3  Goal: Off Pathway (Use only if patient is Off Pathway)  Outcome: Progressing Towards Goal  Goal: Activity/Safety  Outcome: Progressing Towards Goal  Goal: Diagnostic Test/Procedures  Outcome: Progressing Towards Goal  Goal: Nutrition/Diet  Outcome: Progressing Towards Goal  Goal: Discharge Planning  Outcome: Progressing Towards Goal  Goal: Medications  Outcome: Progressing Towards Goal  Goal: Respiratory  Outcome: Progressing Towards Goal  Goal: Treatments/Interventions/Procedures  Outcome: Progressing Towards Goal  Goal: Psychosocial  Outcome: Progressing Towards Goal  Goal: *Oxygen saturation within defined limits  Outcome: Progressing Towards Goal  Goal: *Hemodynamically stable  Outcome: Progressing Towards Goal  Goal: *Optimal pain control at patient's stated goal  Outcome: Progressing Towards Goal  Goal: *Anxiety reduced or absent  Outcome: Progressing Towards Goal  Goal: *Demonstrates progressive activity  Outcome: Progressing Towards Goal     Problem: Heart Failure: Day 4  Goal: Off Pathway (Use only if patient is Off Pathway)  Outcome: Progressing Towards Goal  Goal: Activity/Safety  Outcome: Progressing Towards Goal  Goal: Diagnostic Test/Procedures  Outcome: Progressing Towards Goal  Goal: Nutrition/Diet  Outcome: Progressing Towards Goal  Goal: Discharge Planning  Outcome: Progressing Towards Goal  Goal: Medications  Outcome: Progressing Towards Goal  Goal: Respiratory  Outcome: Progressing Towards Goal  Goal: Treatments/Interventions/Procedures  Outcome: Progressing Towards Goal  Goal: Psychosocial  Outcome: Progressing Towards Goal  Goal: *Oxygen saturation within defined limits  Outcome: Progressing Towards Goal  Goal: *Hemodynamically stable  Outcome: Progressing Towards Goal  Goal: *Optimal pain control at patient's stated goal  Outcome: Progressing Towards Goal  Goal: *Anxiety reduced or absent  Outcome: Progressing Towards Goal  Goal: *Demonstrates progressive activity  Outcome: Progressing Towards Goal     Problem: Heart Failure: Day 5  Goal: Off Pathway (Use only if patient is Off Pathway)  Outcome: Progressing Towards Goal  Goal: Activity/Safety  Outcome: Progressing Towards Goal  Goal: Diagnostic Test/Procedures  Outcome: Progressing Towards Goal  Goal: Nutrition/Diet  Outcome: Progressing Towards Goal  Goal: Discharge Planning  Outcome: Progressing Towards Goal  Goal: Medications  Outcome: Progressing Towards Goal  Goal: Respiratory  Outcome: Progressing Towards Goal  Goal: Treatments/Interventions/Procedures  Outcome: Progressing Towards Goal  Goal: Psychosocial  Outcome: Progressing Towards Goal     Problem: Heart Failure: Discharge Outcomes  Goal: *Demonstrates ability to perform prescribed activity without shortness of breath or discomfort  Outcome: Progressing Towards Goal  Goal: *Left ventricular function assessment completed prior to or during stay, or planned for post-discharge  Outcome: Progressing Towards Goal  Goal: *ACEI prescribed if LVEF less than 40% and no contraindications or ARB prescribed  Outcome: Progressing Towards Goal  Goal: *Verbalizes understanding and describes prescribed diet  Outcome: Progressing Towards Goal  Goal: *Verbalizes understanding/describes prescribed medications  Outcome: Progressing Towards Goal  Goal: *Describes available resources and support systems  Description: (eg: Home Health, Palliative Care, Advanced Medical Directive)  Outcome: Progressing Towards Goal  Goal: *Describes smoking cessation resources  Outcome: Progressing Towards Goal  Goal: *Understands and describes signs and symptoms to report to providers(Stroke Metric)  Outcome: Progressing Towards Goal  Goal: *Describes/verbalizes understanding of follow-up/return appt  Description: (eg: to physicians, diabetes treatment coordinator, and other resources  Outcome: Progressing Towards Goal  Goal: *Describes importance of continuing daily weights and changes to report to physician  Outcome: Progressing Towards Goal

## 2020-12-20 NOTE — ROUTINE PROCESS
Bedside shift change report given to Emiliano Fuller (oncoming nurse) by Winston Guan RN (offgoing nurse). Report included the following information SBAR, Kardex, MAR and Recent Results.

## 2020-12-20 NOTE — ROUTINE PROCESS
Bedside shift change report given to Stephanie Moya RN (oncoming nurse) by Winter Milton RN (offgoing nurse). Report included the following information SBAR, Kardex, MAR and Cardiac Rhythm NSR.

## 2020-12-20 NOTE — DISCHARGE SUMMARY
[address] [facility]  Discharge Summary    Patient: Carlos Garza MRN: [id number]  CSN: [id number]    YOB: 1960  Age: 63 y.o. Sex: female      Admission Date: 12/18/2020 Discharge Date: 12/22/20   Attending: Melody Galo MD PCP: Elmer Shukla MD     ===================================================================    Reason for Admission: Pulmonary HTN ([address] [I27.20]  Congestive heart failure (CHF) ([address] [I50.9]  Heart failure ([address] [I50.9]  CHF (congestive heart failure) ([address] [I50.9]    Discharge Diagnoses:   -Acute on chronic respiratory failure  2/2 Acute on chronic exacerbation HF, PHTN  -ROBERTO likely cardiorenal     Important notes to PCP/ follow-up studies and evaluations   - A1C diabetes is 9.0 follow up on blood sugars  -During med rec, pt reports she filled her prescription for Prolia, but lost it before taking it. Due to insurance not covering another dose for 6 months, she has not taken it and cannot start until May 2021  - follow up with cardiology    Pending labs and studies:  none    Operative Procedures:   LHC and RHC    Discharge Medications:     Current Discharge Medication List        Disposition: Home with Home Health    Consultants:    Cardiology- Dr. Hiram Lawson (including pertinent history and physical findings)  63 y.o. female with PMH of COPD on home O2 (3L NC), IDDM2, HTN, HFpEF, SHERRIE on CPAP, GERD, allergic rhinitis now admitted with CHF exacerbation. Respiratory distress 2/2 acute on chronic HFpEF and PHTN. Pt presented for left and right cath procedure 12/18 and found to be too dyspneic to lay flat on the table and additionally to have lower extremity swelling,. She reports SOB gradually worsening for several weeks with different quality that typical COPD exacerbation. Patient was in mild distress, but only requiring home 02 of 3L and was satting well (%). ECHO (5/24/20) KQ 33 - 70%.  No regional wall motion abnormality. Moderate (grade 2) left ventricular diastolic dysfunction. Mildly dilated left atrium.  Pulmonary arterial systolic pressure is 61 mmHg. Pt was diuresed with  lasix IV with significant improvement of symptoms. Left and right heart cath performed on 12/21 showed Mid LAD lesion, 50% stenosed. Left Circumflex Mid Cx lesion, 50% stenosed. LV systolic pressure is normal. LV end diastolic pressure is normal. LV EDP is: 27. Cardiology recommends outpatient follow up, patient to continue on home po lasix. ROBERTO initially with Cr to 1.34 on admission, baseline 0.98. With diuresis Cr improved to 0.97. CURRENT ADMISSION IMAGING RESULTS   Xr Chest Pa Lat    Result Date: 12/19/2020  IMPRESSION:  1. No acute findings          Cardiology Procedures/Testing:  MODALITY RESULTS   EKG Results for orders placed or performed during the hospital encounter of 12/18/20   EKG, 12 LEAD, INITIAL   Result Value Ref Range    Ventricular Rate 91 BPM    Atrial Rate 91 BPM    P-R Interval 142 ms    QRS Duration 130 ms    Q-T Interval 378 ms    QTC Calculation (Bezet) 464 ms    Calculated P Axis 68 degrees    Calculated R Axis 56 degrees    Calculated T Axis 32 degrees    Diagnosis       Normal sinus rhythm  Right bundle branch block  Possible Inferior infarct , age undetermined  Abnormal ECG  When compared with ECG of 08-AUG-2020 15:30,  premature atrial complexes are no longer present  Right bundle branch block is now present  Minimal criteria for Anterior infarct are no longer present  Borderline criteria for Inferior infarct are now present  Confirmed by Edilson Eason MD, --- ([id number]) on 12/19/2020 9:02:15 AM         ECHO 05/21/20   ECHO ADULT COMPLETE 05/25/2020 5/25/2020    Narrative · Image quality for this study was technically difficult. Contrast used:   DEFINITY. · Normal cavity size and systolic function (ejection fraction normal). Moderate concentric hypertrophy.  Estimated left ventricular ejection   fraction is 65 - 70%. Visually measured ejection fraction. No regional   wall motion abnormality noted. Moderate (grade 2) left ventricular   diastolic dysfunction. · Mildly dilated left atrium. · Pulmonary hypertension. Pulmonary arterial systolic pressure is 61 mmHg. · Severely elevated central venous pressure (15+ mmHg); IVC diameter is   larger than 21 mm and collapses less than 50% with respiration. Signed by: Carol Shaver MD      Nuclear Medicine Results from [facility] Encounter encounter on 09/09/13   NM LUNG PERFUSION W VENT    Impression IMPRESSION:    Low probability of pulmonary embolism. Results from [place] encounter on 09/04/13   TRANSCRIBED NUCLEAR MEDICINE   Results from Hospital Encounter encounter on 12/06/12   NM LUNG VENT/PERF    Impression IMPRESSION:    Very low probability for pulmonary embolus. IR No results found for this or any previous visit.    CATH       Special Testing/Procedures:  MODALITY RESULTS   MICRO All Micro Results     None         ABG Lab Results   Component Value Date/Time    pH (POC) 7.39 08/08/2020 09:14 AM    pCO2 (POC) 38.6 08/08/2020 09:14 AM    pO2 (POC) 89 08/08/2020 09:14 AM    HCO3 (POC) 23.2 08/08/2020 09:14 AM    FIO2 (POC) 32 08/08/2020 09:14 AM        Laboratory Results:  LABORATORY RESULTS   HEMATOLOGY Lab Results   Component Value Date/Time    WBC 11.4 12/19/2020 01:38 AM    HGB 13.0 12/19/2020 01:38 AM    HCT 39.8 12/19/2020 01:38 AM    PLATELET 496 07/81/9725 01:38 AM    MCV 85.2 12/19/2020 01:38 AM       CHEMISTRIES Lab Results   Component Value Date/Time    Sodium 136 12/19/2020 01:38 AM    Potassium 4.5 12/19/2020 01:38 AM    Chloride 100 12/19/2020 01:38 AM    CO2 28 12/19/2020 01:38 AM    Anion gap 8 12/19/2020 01:38 AM    Glucose 323 (H) 12/19/2020 01:38 AM    BUN 25 (H) 12/19/2020 01:38 AM    Creatinine 1.33 (H) 12/19/2020 01:38 AM    BUN/Creatinine ratio 19 12/19/2020 01:38 AM    GFR est AA 49 (L) 12/19/2020 01:38 AM    GFR est non-AA 41 (L) 12/19/2020 01:38 AM    Calcium 9.7 12/19/2020 01:38 AM      HEPATIC FUNCTION Lab Results   Component Value Date/Time    Albumin 3.8 12/09/2020 03:19 PM    Bilirubin, direct <0.1 02/08/2017 10:00 PM    Bilirubin, total 0.4 12/09/2020 03:19 PM    Protein, total 7.5 12/09/2020 03:19 PM    Globulin 3.7 12/09/2020 03:19 PM    A-G Ratio 1.0 12/09/2020 03:19 PM    ALT (SGPT) 35 12/09/2020 03:19 PM    Alk. phosphatase 98 12/09/2020 03:19 PM       LACTIC ACID Lab Results   Component Value Date/Time    Lactic acid 1.8 08/12/2020 01:57 AM    Lactic acid 1.6 08/11/2020 04:33 AM    Lactic acid 4.8 (HH) 08/10/2020 03:26 AM      CARDIAC PANEL Lab Results   Component Value Date/Time    CK 31 08/07/2020 03:10 PM    CK - MB <1.0 08/07/2020 03:10 PM    CK-MB Index  08/07/2020 03:10 PM     CALCULATION NOT PERFORMED WHEN RESULT IS BELOW LINEAR LIMIT    Troponin-I, QT <0.02 08/07/2020 03:10 PM      NT-proBNP Lab Results   Component Value Date/Time    NT pro-BNP 50 12/18/2020 05:30 PM    NT pro-BNP 45 08/07/2020 03:10 PM    NT pro- 07/22/2020 12:24 PM    NT pro- 07/09/2020 03:53 PM    NT pro- 07/07/2020 04:15 PM      THYROID Lab Results   Component Value Date/Time    TSH 2.29 12/09/2020 03:19 PM      LIPID PANEL Lab Results   Component Value Date/Time    Cholesterol, total 220 (H) 11/20/2012 03:22 AM    HDL Cholesterol 62 (H) 11/20/2012 03:22 AM    LDL, calculated 144.6 (H) 11/20/2012 03:22 AM    VLDL, calculated 13.4 11/20/2012 03:22 AM    Triglyceride 67 11/20/2012 03:22 AM    CHOL/HDL Ratio 3.5 11/20/2012 03:22 AM         RISK CALCULATORS:  SCORE RESULT   ASCVD The ASCVD Risk score (Ernestine Osman., et al., 2013) failed to calculate for the following reasons:    ASCVD risk score not calculated    [id number]     HAS-BLED    READMISSION RISK SCORE High Risk            48       Total Score        3 Has Seen PCP in Last 6 Months (Yes=3, No=0)    11 IP Visits Last 12 Months (1-3=4, 4=9, >4=11)    9 Pt. Coverage (Medicare=5 , Medicaid, or Self-Pay=4)    25 Charlson Comorbidity Score (Age + Comorbid Conditions)        Criteria that do not apply:    . Living with Significant Other. Assisted Living. LTAC. SNF.  or   Rehab    Patient Length of Stay (>5 days = 3)           Functional status and cognitive function:    Ambulates with w/o assistance   Status: alert, cooperative, no distress, appears stated age  Condition: STABLE  Disposition: home    Diet: diabetic     Code status and advanced care plan: Full    Point of Contact [facility]  Relationship: Daughter  [phone]      Patient Education:  Patient was educated on the following topics prior to discharge:Asthma attack    Follow-up:   Follow-up Information    None         =================================================================    Samantha Anand MD, PGY-1   Kelli Chapman   Intern Pager: [phone]   December 19, 2020, 7:08 PM

## 2020-12-20 NOTE — PROGRESS NOTES
[facility]      CARDIOLOGY PROGRESS NOTE  RECS:  1. Shortness of breath - improved after diuresis with IV lasix, plan for cardiac cath on Monday if patient is stable. Patient has contrast dye allergy - will need pre-medication prior to cath Prednisone 40 mg BID and Benadryl 25 mg ordered. 2. Acute on chronic diastolic CHF - EF 26-82% by recent echo, NT pro BNP 50, Continue IV lasix, monitor I/O and BMP  3. ORBERTO  Monitor renal function with diuresis. Creatinine slightly better today. 4. COPD - continue antibiotics and supportive care per hospitalist team.  5. Pulmonary Hypertension - PAP 61 mm Hg by echo 5/2020 - continue CPAP at night. 6. Hypertension - controlled. 7. DM II - uncontrolled, A1C 9.0 - management per hospitalist, check fasting lipids in AM  8. RBBB - new on EKG  9. Hyperlipidemia -  - intolerant to statins per notes    ASSESSMENT:  Hospital Problems  Date Reviewed: 8/19/2020          Codes Class Noted POA    * (Principal) Heart failure ([facility] [address] ICD-10-CM: I50.9  ICD-9-CM: 428.9  12/18/2020 Unknown        CHF (congestive heart failure) ([facility]) ICD-10-CM: I50.9  ICD-9-CM: 428.0  12/18/2020 Unknown                SUBJECTIVE:  No CP   SOB improved    OBJECTIVE:    VS:   Visit Vitals  BP (!) 116/59 (BP 1 Location: Right arm, BP Patient Position: Supine)   Pulse 67   Temp 97.3 °F (36.3 °C)   Resp 22   Ht 5' 3\" (1.6 m)   Wt 121 kg (266 lb 11.2 oz)   SpO2 96%   Breastfeeding No   BMI 47.24 kg/m²         Intake/Output Summary (Last 24 hours) at 12/20/2020 0930  Last data filed at 12/20/2020 0404  Gross per 24 hour   Intake 1611 ml   Output 1500 ml   Net 111 ml     TELE: normal sinus rhythm    General: alert, cooperative and in no apparent distress  HENT: Normocephalic, atraumatic. Normal external eye.   Neck :  JVD difficult to assess due to obesity  Cardiac:  regular rate and rhythm, S1, S2 normal, no murmur, click, rub or gallop  Lungs: Bibasilar expiratory wheezes  Abdomen: Soft, nontender, no masses  Extremities:  1+ BLE edema, peripheral pulses present      Labs: Results:       Chemistry Recent Labs     12/20/20  0032 12/19/20  0138 12/18/20  1730   * 323* 376*   * 136 135*   K 4.0 4.5 4.6   CL 99* 100 100   CO2 30 28 29   BUN 28* 25* 18   CREA 1.23 1.33* 1.34*   CA 9.9 9.7 9.7   AGAP 6 8 6   BUCR 23* 19 13      CBC w/Diff Recent Labs     12/20/20  0032 12/19/20  0138 12/18/20  1730   WBC 13.8* 11.4 8.3   RBC 4.48 4.67 4.74   HGB 12.6 13.0 13.2   HCT 38.3 39.8 40.4    371 356      Cardiac Enzymes No results for input(s): CPK, CKND1, KESHIA in the last 72 hours. No lab exists for component: CKRMB, TROIP   Coagulation No results for input(s): PTP, INR, APTT, INREXT in the last 72 hours. Lipid Panel Lab Results   Component Value Date/Time    Cholesterol, total 260 (H) 12/20/2020 12:32 AM    HDL Cholesterol 55 12/20/2020 12:32 AM    LDL, calculated 146.8 (H) 12/20/2020 12:32 AM    VLDL, calculated 58.2 12/20/2020 12:32 AM    Triglyceride 291 (H) 12/20/2020 12:32 AM    CHOL/HDL Ratio 4.7 12/20/2020 12:32 AM      BNP No results for input(s): BNPP in the last 72 hours. Liver Enzymes No results for input(s): TP, ALB, TBIL, AP in the last 72 hours. No lab exists for component: SGOT, GPT, DBIL   Thyroid Studies Lab Results   Component Value Date/Time    TSH 2.29 12/09/2020 03:19 PM              Rosa Echavarria NP         I have independently evaluated taken history and examined the patient. All relevant labs and testing data's are reviewed. Care plan discussed and updated after review.   Bushra Haskins MD

## 2020-12-20 NOTE — PROGRESS NOTES
Reason for Admission:   Pulmonary HTN ([facility] [I27.20]  Congestive heart failure (CHF) (HCC) [I50.9]  Heart failure (HCC) [I50.9]  CHF (congestive heart failure) ([facility] [address] [I50.9]               RUR Score:    45%             Resources/supports as identified by patient/family:   Patients daughter and personal care aides are support system. Top Challenges facing patient (as identified by patient/family and CM): Finances/Medication cost?    n/a  Transportation     Daughter or medicaid transport. Support system or lack thereof? Good support system  Living arrangements? Daughter lives in home. Self-care/ADLs/Cognition? Needs assistance with ADL's/ Alert & oriented x 4. Current Advanced Directive/Advance Care Plan:   yes                          Plan for utilizing home health:    no. Patient is refusing [facility] services. Likelihood of readmission:   HIGH    Transition of Care Plan:                    Initial assessment completed with patient. Cognitive status of patient: oriented to time, place, person and situation. Face sheet information confirmed:  yes. The patient designates Layla Castellon, daughter ([phone]) to participate in her discharge plan and to receive any needed information. This patient lives in a single family home with daughter. Patient was able to navigate steps as needed with assistjve device. Prior to hospitalization, patient was considered to be independent with ADLs/IADLS : no . If not independent,  patient needs assist with : dressing, bathing, food preparation and grooming. Patients has personal care aides M-F from [place] [phone] with [employer]. Patient has a current ACP document on file: yes     The patient's daughter will be available to transport patient home upon discharge. The patient already has Walker, BSC, and oxygen concentrator and tanks (2 Liters NC from [employer]) medical equipment available in the home.      Patient is not currently active with home health. Patient has not stayed in a skilled nursing facility or rehab. This patient is on dialysis :no     Freedom of choice signed: no.     Currently, the discharge plan is Home with family assistance and personal care aides    Patient is refusing home health services. Patient stated \" I don't need home health. I have my daughter and aides to help me with everything I need. I don't need physical therapy. \"    The patient states that she can obtain her medications from the pharmacy, and take her medications as directed. Patient's current insurance is Medicare Part A &B and [facility] medicaid/[facility] [employer]      Care Management Interventions  PCP Verified by CM: Yes  Mode of Transport at Discharge: Self  Transition of Care Consult (CM Consult): Discharge Planning  Discharge Durable Medical Equipment: No  Physical Therapy Consult: Yes  Occupational Therapy Consult: Yes  Speech Therapy Consult: No  Current Support Network: Relative's Home, Has Personal Caregivers  Confirm Follow Up Transport: Self  Discharge Location  Discharge Placement: Home with family assistance      ANA M Benavides, RN  Pager # [phone]  Care Manager

## 2020-12-21 LAB
ANION GAP SERPL CALC-SCNC: 5 MMOL/L (ref 3–18)
BUN SERPL-MCNC: 23 MG/DL (ref 7–18)
BUN/CREAT SERPL: 23 (ref 12–20)
CALCIUM SERPL-MCNC: 9.8 MG/DL (ref 8.5–10.1)
CHLORIDE SERPL-SCNC: 99 MMOL/L (ref 100–111)
CO2 SERPL-SCNC: 31 MMOL/L (ref 21–32)
CREAT SERPL-MCNC: 0.98 MG/DL (ref 0.6–1.3)
ERYTHROCYTE [DISTWIDTH] IN BLOOD BY AUTOMATED COUNT: 13.7 % (ref 11.6–14.5)
GLUCOSE BLD STRIP.AUTO-MCNC: 159 MG/DL (ref 70–110)
GLUCOSE BLD STRIP.AUTO-MCNC: 230 MG/DL (ref 70–110)
GLUCOSE BLD STRIP.AUTO-MCNC: 283 MG/DL (ref 70–110)
GLUCOSE SERPL-MCNC: 207 MG/DL (ref 74–99)
HCT VFR BLD AUTO: 38.3 % (ref 35–45)
HGB BLD-MCNC: 12.5 G/DL (ref 12–16)
MCH RBC QN AUTO: 27.8 PG (ref 24–34)
MCHC RBC AUTO-ENTMCNC: 32.6 G/DL (ref 31–37)
MCV RBC AUTO: 85.3 FL (ref 74–97)
PLATELET # BLD AUTO: 351 K/UL (ref 135–420)
PMV BLD AUTO: 9.3 FL (ref 9.2–11.8)
POTASSIUM SERPL-SCNC: 4 MMOL/L (ref 3.5–5.5)
RBC # BLD AUTO: 4.49 M/UL (ref 4.2–5.3)
SODIUM SERPL-SCNC: 135 MMOL/L (ref 136–145)
WBC # BLD AUTO: 9.6 K/UL (ref 4.6–13.2)

## 2020-12-21 PROCEDURE — 94660 CPAP INITIATION&MGMT: CPT

## 2020-12-21 PROCEDURE — 74011000250 HC RX REV CODE- 250: Performed by: INTERNAL MEDICINE

## 2020-12-21 PROCEDURE — 74011250637 HC RX REV CODE- 250/637: Performed by: STUDENT IN AN ORGANIZED HEALTH CARE EDUCATION/TRAINING PROGRAM

## 2020-12-21 PROCEDURE — 74011000636 HC RX REV CODE- 636: Performed by: INTERNAL MEDICINE

## 2020-12-21 PROCEDURE — 99153 MOD SED SAME PHYS/QHP EA: CPT | Performed by: INTERNAL MEDICINE

## 2020-12-21 PROCEDURE — 65660000000 HC RM CCU STEPDOWN

## 2020-12-21 PROCEDURE — 74011636637 HC RX REV CODE- 636/637: Performed by: STUDENT IN AN ORGANIZED HEALTH CARE EDUCATION/TRAINING PROGRAM

## 2020-12-21 PROCEDURE — C1894 INTRO/SHEATH, NON-LASER: HCPCS | Performed by: INTERNAL MEDICINE

## 2020-12-21 PROCEDURE — 74011636637 HC RX REV CODE- 636/637: Performed by: INTERNAL MEDICINE

## 2020-12-21 PROCEDURE — 2709999900 HC NON-CHARGEABLE SUPPLY

## 2020-12-21 PROCEDURE — 77030015766: Performed by: INTERNAL MEDICINE

## 2020-12-21 PROCEDURE — 74011000250 HC RX REV CODE- 250: Performed by: STUDENT IN AN ORGANIZED HEALTH CARE EDUCATION/TRAINING PROGRAM

## 2020-12-21 PROCEDURE — C1769 GUIDE WIRE: HCPCS | Performed by: INTERNAL MEDICINE

## 2020-12-21 PROCEDURE — 77030013797 HC KT TRNSDUC PRSSR EDWD -A: Performed by: INTERNAL MEDICINE

## 2020-12-21 PROCEDURE — 4A023N7 MEASUREMENT OF CARDIAC SAMPLING AND PRESSURE, LEFT HEART, PERCUTANEOUS APPROACH: ICD-10-PCS | Performed by: INTERNAL MEDICINE

## 2020-12-21 PROCEDURE — 74011636637 HC RX REV CODE- 636/637: Performed by: NURSE PRACTITIONER

## 2020-12-21 PROCEDURE — 74011250636 HC RX REV CODE- 250/636: Performed by: STUDENT IN AN ORGANIZED HEALTH CARE EDUCATION/TRAINING PROGRAM

## 2020-12-21 PROCEDURE — B2111ZZ FLUOROSCOPY OF MULTIPLE CORONARY ARTERIES USING LOW OSMOLAR CONTRAST: ICD-10-PCS | Performed by: INTERNAL MEDICINE

## 2020-12-21 PROCEDURE — 82962 GLUCOSE BLOOD TEST: CPT

## 2020-12-21 PROCEDURE — 77030016699 HC CATH ANGI DX INFN1 CARD -A: Performed by: INTERNAL MEDICINE

## 2020-12-21 PROCEDURE — 93458 L HRT ARTERY/VENTRICLE ANGIO: CPT | Performed by: INTERNAL MEDICINE

## 2020-12-21 PROCEDURE — 77030004558 HC CATH ANGI DX SUPR TORQ CARD -A: Performed by: INTERNAL MEDICINE

## 2020-12-21 PROCEDURE — 99152 MOD SED SAME PHYS/QHP 5/>YRS: CPT | Performed by: INTERNAL MEDICINE

## 2020-12-21 PROCEDURE — B2151ZZ FLUOROSCOPY OF LEFT HEART USING LOW OSMOLAR CONTRAST: ICD-10-PCS | Performed by: INTERNAL MEDICINE

## 2020-12-21 PROCEDURE — 94640 AIRWAY INHALATION TREATMENT: CPT

## 2020-12-21 PROCEDURE — 99232 SBSQ HOSP IP/OBS MODERATE 35: CPT | Performed by: INTERNAL MEDICINE

## 2020-12-21 PROCEDURE — 74011250636 HC RX REV CODE- 250/636: Performed by: INTERNAL MEDICINE

## 2020-12-21 PROCEDURE — 80048 BASIC METABOLIC PNL TOTAL CA: CPT

## 2020-12-21 PROCEDURE — 77030019569 HC BND COMPR RAD TERU -B: Performed by: INTERNAL MEDICINE

## 2020-12-21 PROCEDURE — 74011250637 HC RX REV CODE- 250/637: Performed by: NURSE PRACTITIONER

## 2020-12-21 PROCEDURE — 36415 COLL VENOUS BLD VENIPUNCTURE: CPT

## 2020-12-21 PROCEDURE — 85027 COMPLETE CBC AUTOMATED: CPT

## 2020-12-21 RX ORDER — FENTANYL CITRATE 50 UG/ML
INJECTION, SOLUTION INTRAMUSCULAR; INTRAVENOUS AS NEEDED
Status: DISCONTINUED | OUTPATIENT
Start: 2020-12-21 | End: 2020-12-21 | Stop reason: HOSPADM

## 2020-12-21 RX ORDER — VERAPAMIL HYDROCHLORIDE 2.5 MG/ML
INJECTION, SOLUTION INTRAVENOUS AS NEEDED
Status: DISCONTINUED | OUTPATIENT
Start: 2020-12-21 | End: 2020-12-21 | Stop reason: HOSPADM

## 2020-12-21 RX ORDER — SODIUM CHLORIDE 0.9 % (FLUSH) 0.9 %
5-40 SYRINGE (ML) INJECTION AS NEEDED
Status: CANCELLED | OUTPATIENT
Start: 2020-12-21

## 2020-12-21 RX ORDER — SODIUM CHLORIDE 0.9 % (FLUSH) 0.9 %
5-40 SYRINGE (ML) INJECTION EVERY 8 HOURS
Status: CANCELLED | OUTPATIENT
Start: 2020-12-21

## 2020-12-21 RX ORDER — HEPARIN SODIUM 1000 [USP'U]/ML
INJECTION, SOLUTION INTRAVENOUS; SUBCUTANEOUS AS NEEDED
Status: DISCONTINUED | OUTPATIENT
Start: 2020-12-21 | End: 2020-12-21 | Stop reason: HOSPADM

## 2020-12-21 RX ORDER — LIDOCAINE HYDROCHLORIDE 10 MG/ML
INJECTION, SOLUTION EPIDURAL; INFILTRATION; INTRACAUDAL; PERINEURAL AS NEEDED
Status: DISCONTINUED | OUTPATIENT
Start: 2020-12-21 | End: 2020-12-21 | Stop reason: HOSPADM

## 2020-12-21 RX ORDER — HYDRALAZINE HYDROCHLORIDE 20 MG/ML
INJECTION INTRAMUSCULAR; INTRAVENOUS AS NEEDED
Status: DISCONTINUED | OUTPATIENT
Start: 2020-12-21 | End: 2020-12-21 | Stop reason: HOSPADM

## 2020-12-21 RX ORDER — MIDAZOLAM HYDROCHLORIDE 1 MG/ML
INJECTION, SOLUTION INTRAMUSCULAR; INTRAVENOUS AS NEEDED
Status: DISCONTINUED | OUTPATIENT
Start: 2020-12-21 | End: 2020-12-21 | Stop reason: HOSPADM

## 2020-12-21 RX ADMIN — INSULIN LISPRO 9 UNITS: 100 INJECTION, SOLUTION INTRAVENOUS; SUBCUTANEOUS at 22:06

## 2020-12-21 RX ADMIN — AZITHROMYCIN MONOHYDRATE 250 MG: 250 TABLET ORAL at 21:55

## 2020-12-21 RX ADMIN — ARFORMOTEROL TARTRATE 15 MCG: 15 SOLUTION RESPIRATORY (INHALATION) at 08:00

## 2020-12-21 RX ADMIN — PREDNISONE 40 MG: 20 TABLET ORAL at 09:00

## 2020-12-21 RX ADMIN — Medication 10 ML: at 06:57

## 2020-12-21 RX ADMIN — LISINOPRIL 20 MG: 20 TABLET ORAL at 19:02

## 2020-12-21 RX ADMIN — MONTELUKAST SODIUM 10 MG: 10 TABLET, COATED ORAL at 21:55

## 2020-12-21 RX ADMIN — BUDESONIDE 1000 MCG: 0.5 SUSPENSION RESPIRATORY (INHALATION) at 08:00

## 2020-12-21 RX ADMIN — INSULIN LISPRO 6 UNITS: 100 INJECTION, SOLUTION INTRAVENOUS; SUBCUTANEOUS at 16:30

## 2020-12-21 RX ADMIN — ACETAMINOPHEN 500 MG: 500 TABLET ORAL at 21:55

## 2020-12-21 RX ADMIN — Medication 81 MG: at 10:16

## 2020-12-21 RX ADMIN — LISINOPRIL 20 MG: 20 TABLET ORAL at 09:00

## 2020-12-21 RX ADMIN — PANTOPRAZOLE SODIUM 40 MG: 40 TABLET, DELAYED RELEASE ORAL at 09:09

## 2020-12-21 RX ADMIN — DIPHENHYDRAMINE HYDROCHLORIDE 25 MG: 25 CAPSULE ORAL at 09:00

## 2020-12-21 RX ADMIN — IPRATROPIUM BROMIDE: 0.5 SOLUTION RESPIRATORY (INHALATION) at 08:00

## 2020-12-21 RX ADMIN — INSULIN GLARGINE 30 UNITS: 100 INJECTION, SOLUTION SUBCUTANEOUS at 22:11

## 2020-12-21 RX ADMIN — INSULIN GLARGINE 10 UNITS: 100 INJECTION, SOLUTION SUBCUTANEOUS at 09:13

## 2020-12-21 RX ADMIN — HEPARIN SODIUM 5000 UNITS: 5000 INJECTION INTRAVENOUS; SUBCUTANEOUS at 21:56

## 2020-12-21 RX ADMIN — Medication 10 ML: at 22:05

## 2020-12-21 RX ADMIN — FLUTICASONE PROPIONATE 2 SPRAY: 50 SPRAY, METERED NASAL at 09:01

## 2020-12-21 NOTE — PROGRESS NOTES
Bedside shift change report given to Verito Manley RN (oncoming nurse) by Lisa Nicole RN (offgoing nurse). Report included the following information SBAR, Procedure Summary, Intake/Output, Accordion and Recent Results.

## 2020-12-21 NOTE — PROGRESS NOTES
Bedside shift change report given to AMRITA Suero RN (oncoming nurse) by SHELLIE German RN (offgoing nurse). Report included the following information MAR and Recent Results.

## 2020-12-21 NOTE — DIABETES MGMT
Glycemic Control Plan of Care    Assessment: attempted to see patient x 2 today - still off the unit. Admitted with SOB and exacerbation of HF   Lab  mg/dl today  Was receiving prednisone 40 mg daily with resulting hyperglycemia - this has been completed  Per PTA med list, she takes a maintenance dose of prednisone 5 mg daily  Will continue to monitor and f/u in the morning     Most recent blood glucose values:      Current A1C:  Lab Results   Component Value Date/Time    Hemoglobin A1c 9.0 (H) 12/18/2020 05:30 PM     Current hospital diabetes medications:  Lantus 20 units AM  Lantus 30 units PM  Corrective lispro with meals    TTD previous day = 92 units   50 units lantus  42 units lispro    Home diabetes medications:  Lantus 45 units nightly  Novolog with meals - sliding scale    Goals:  Blood glucose will be within target range of  mg/dL by 12/24/2020    Education:  ___  Refer to Diabetes Education Record             _X__  Education not indicated at this time - will follow up tomorrow for DM diet educ.        Marco Pascual MPH RN CDE  Pager [phone]

## 2020-12-21 NOTE — PROGRESS NOTES
[facility]      CARDIOLOGY PROGRESS NOTE  RECS:  1. Shortness of breath - improved after diuresis with IV lasix  2. Acute on chronic diastolic CHF - EF 64-35% by recent echo, NT pro BNP 50, Continue IV lasix, monitor I/O and BMP- will proceed with L&RHC  3. ROBERTO  Monitor renal function with diuresis. Creatinine slightly better today. 4. COPD - continue antibiotics and supportive care per hospitalist team.  5. Pulmonary Hypertension - PAP 61 mm Hg by echo 5/2020 - continue CPAP at night. 6. Hypertension - controlled. 7. DM II - uncontrolled, A1C 9.0 - management per hospitalist, check fasting lipids in AM  8. RBBB - new on EKG  9. Hyperlipidemia -  - intolerant to statins per notes    ASSESSMENT:  Hospital Problems  Date Reviewed: 8/19/2020          Codes Class Noted POA    * (Principal) Heart failure ([facility] [address] ICD-10-CM: I50.9  ICD-9-CM: 428.9  12/18/2020 Unknown        CHF (congestive heart failure) ([facility]) ICD-10-CM: I50.9  ICD-9-CM: 428.0  12/18/2020 Unknown                SUBJECTIVE:  No CP   SOB improved    OBJECTIVE:    VS:   Visit Vitals  BP (!) 110/54   Pulse 74   Temp 98 °F (36.7 °C)   Resp 23   Ht 5' 3\" (1.6 m)   Wt 119.7 kg (263 lb 12.8 oz)   SpO2 99%   Breastfeeding No   BMI 46.73 kg/m²         Intake/Output Summary (Last 24 hours) at 12/21/2020 1345  Last data filed at 12/21/2020 0912  Gross per 24 hour   Intake 470 ml   Output 1400 ml   Net -930 ml     TELE: normal sinus rhythm    General: alert, cooperative and in no apparent distress  HENT: Normocephalic, atraumatic. Normal external eye.   Neck :  JVD difficult to assess due to obesity  Cardiac:  regular rate and rhythm, S1, S2 normal, no murmur, click, rub or gallop  Lungs: Bibasilar expiratory wheezes  Abdomen: Soft, nontender, no masses  Extremities:  mild BLE edema, peripheral pulses present      Labs: Results:       Chemistry Recent Labs     12/21/20  0408 12/20/20  0032 12/19/20  0138   * 261* 323* * 135* 136   K 4.0 4.0 4.5   CL 99* 99* 100   CO2 31 30 28   BUN 23* 28* 25*   CREA 0.98 1.23 1.33*   CA 9.8 9.9 9.7   AGAP 5 6 8   BUCR 23* 23* 19      CBC w/Diff Recent Labs     12/21/20  0408 12/20/20  0032 12/19/20  0138   WBC 9.6 13.8* 11.4   RBC 4.49 4.48 4.67   HGB 12.5 12.6 13.0   HCT 38.3 38.3 39.8    378 371      Cardiac Enzymes No results for input(s): CPK, CKND1, KESHIA in the last 72 hours. No lab exists for component: CKRMB, TROIP   Coagulation No results for input(s): PTP, INR, APTT, INREXT, INREXT in the last 72 hours. Lipid Panel Lab Results   Component Value Date/Time    Cholesterol, total 260 (H) 12/20/2020 12:32 AM    HDL Cholesterol 55 12/20/2020 12:32 AM    LDL, calculated 146.8 (H) 12/20/2020 12:32 AM    VLDL, calculated 58.2 12/20/2020 12:32 AM    Triglyceride 291 (H) 12/20/2020 12:32 AM    CHOL/HDL Ratio 4.7 12/20/2020 12:32 AM      BNP No results for input(s): BNPP in the last 72 hours. Liver Enzymes No results for input(s): TP, ALB, TBIL, AP in the last 72 hours.     No lab exists for component: SGOT, GPT, DBIL   Thyroid Studies Lab Results   Component Value Date/Time    TSH 2.29 12/09/2020 03:19 PM                Ofe Myers MD

## 2020-12-21 NOTE — PROGRESS NOTES
Bedside shift change report given to Yajaira Murphy (oncoming nurse) by Darlene Lawrence (offgoing nurse).  Report included the following information SBAR and Pre Procedure Checklist.

## 2020-12-21 NOTE — PROGRESS NOTES
TRANSFER - OUT REPORT:    Verbal report given to SOCO jean-baptiste(name) on Simmons Cast  being transferred to cath lab(unit) for ordered procedure       Report consisted of patients Situation, Background, Assessment and   Recommendations(SBAR). Information from the following report(s) SBAR, Intake/Output, MAR, Accordion, Recent Results, Pre Procedure Checklist and Procedure Verification was reviewed with the receiving nurse. Lines:   Peripheral IV 12/21/20 Left Wrist (Active)   Site Assessment Clean, dry, & intact 12/21/20 0859   Phlebitis Assessment 0 12/21/20 0859   Infiltration Assessment 0 12/21/20 0859   Dressing Status Clean, dry, & intact 12/21/20 0859   Dressing Type Transparent 12/21/20 0859   Hub Color/Line Status Blue;Flushed 12/21/20 0859   Alcohol Cap Used Yes 12/21/20 0859       Peripheral IV 12/21/20 Right Arm (Active)   Site Assessment Clean, dry, & intact 12/21/20 0859   Phlebitis Assessment 0 12/21/20 0859   Infiltration Assessment 0 12/21/20 0859   Dressing Status Clean, dry, & intact 12/21/20 0859   Dressing Type Transparent 12/21/20 0859   Hub Color/Line Status Blue;Flushed 12/21/20 0859   Alcohol Cap Used Yes 12/21/20 0859        Opportunity for questions and clarification was provided.       Patient transported with:   [employer]

## 2020-12-21 NOTE — PROGRESS NOTES
conducted an initial consultation and Spiritual Assessment for Alex Gunderson, who is a 63 y.o.,female. Patients Primary Language is: [place].    According to the patients EMR Holiness Affiliation is: Monica Hirsch.     The reason the Patient came to the hospital is:   Patient Active Problem List    Diagnosis Date Noted    Heart failure ([address] 12/18/2020     Priority: 1 - One    CHF (congestive heart failure) ([address] 12/18/2020     Priority: 1 - One    Acute otitis externa 10/28/2020    Anxiety 10/28/2020    Diastasis recti 50/79/7905    Diastolic dysfunction 77/31/1225    Diverticulitis 10/28/2020    Pleurisy 10/28/2020    H/O allergy 10/28/2020    Hepatic steatosis 10/28/2020    Hypersomnia 10/28/2020    Inadequate sleep hygiene 10/28/2020    Lung nodule 10/28/2020    Obstructive chronic bronchitis with acute exacerbation ([address] 10/28/2020    Osteoarthritis 10/28/2020    Osteoporosis 10/28/2020    Periodic limb movements of sleep 10/28/2020    Racing heart beat 10/28/2020    Right upper quadrant abdominal pain 10/28/2020    Urinary frequency 10/28/2020    Urinary tract infection 10/28/2020    Vitamin D deficiency 10/28/2020    Severe persistent asthma with exacerbation 08/21/2020    Pneumonia due to COVID-19 virus 08/07/2020    COVID-19 virus infection 07/22/2020    Acute respiratory disease due to COVID-19 virus 07/22/2020    Hypoxia 07/09/2020    COVID-19 07/09/2020    Hypokalemia 07/09/2020    Atelectasis of right lung 12/14/2018    Acute exacerbation of chronic obstructive pulmonary disease (COPD) ([address] 10/07/2018    Bilateral carotid bruits 07/30/2018    Palpitations 07/30/2018    Type 2 diabetes with nephropathy ([address] 05/30/2018    Hyperlipidemia 01/24/2018    COPD with acute bronchitis ([address] 89/39/9930    Diastolic CHF, chronic ([address] 02/09/2017    Diverticulosis 02/09/2017    COPD exacerbation ([address] 02/08/2017    Acute exacerbation of COPD with asthma ([facility] 02/08/2017    Obesity, Class III, BMI 40-49.9 (morbid obesity) ([facility] 08/09/2016    Chest pain 08/09/2016    Dyspnea 08/09/2016    COPD with acute exacerbation (HCC) 05/24/2015    COPD (chronic obstructive pulmonary disease) (HCC) 03/27/2015    Allergic rhinitis 03/27/2015    Essential hypertension, benign 09/30/2012    Diabetes mellitus, type 2 ([facility] 09/30/2012    Esophageal reflux 09/30/2012    SHERRIE on CPAP     Pneumonia 11/16/2009    Status asthmaticus 09/21/2009        The  provided the following Interventions:  Initiated a relationship of care and support. Explored issues of scout, belief, spirituality and Samaritan/ritual needs while hospitalized. Listened empathically. Patient shared her utmost testimony of God's good ness when she came out of being Covid-19 positive. She relates it was the most painful condition she ever had but \"I prayed for the blood of Zenon that day as I surrender my life to him. Just then a lady in yellow suit came into the room and had me sign a consent to be transfused with plasma at that time with the hope that it will work and it did. \"  Provided chaplaincy education. Provided information about Spiritual Care Services. Offered prayer and assurance of continued prayers on patient's behalf. Chart reviewed. The following outcomes where achieved:  Patient shared limited information about both her medical narrative and spiritual journey/beliefs.  confirmed Patient has No Yazidism Affiliation but she had always been raised in a prayerful home. Patient processed feeling about current hospitalization. Patient expressed gratitude for 's visit. Assessment:  Patient does not have any Samaritan/cultural needs that will affect patients preferences in health care. There are no spiritual or Samaritan issues which require intervention at this time.      Plan:  Chaplains will continue to follow and will provide pastoral care on an as needed/requested basis.  recommends bedside caregivers page  on duty if patient shows signs of acute spiritual or emotional distress.     [address] [facility]   [phone]

## 2020-12-21 NOTE — PROGRESS NOTES
[facility]  Progress Note    Patient: Dario Barnett MRN: [id number]   SSN: [id number]  YOB: 1960   Age: 63 y.o. Sex: female      Admit Date: 12/18/2020    LOS: 3 days   No chief complaint on file. Subjective:     Pt breathing improved    ROS   No chest pain, No abdominal pain  No diarrhea, No fever/chills    Objective:     Visit Vitals  /81 (BP 1 Location: Right arm, BP Patient Position: Lying left side)   Pulse 65   Temp 98 °F (36.7 °C)   Resp 20   Ht 5' 3\" (1.6 m)   Wt 119.7 kg (263 lb 12.8 oz)   SpO2 97%   Breastfeeding No   BMI 46.73 kg/m²       Physical Exam:   General appearance: alert, cooperative, no distress, appears stated age  Lungs: clear to auscultation bilaterally  Heart: regular rate and rhythm, S1, S2 normal, no murmur, click, rub or gallop  Abdomen: soft, non-tender. Bowel sounds normal. No masses,  no organomegaly  Pulses: 2+ and symmetric  Skin: Skin color, texture, turgor normal. No rashes or lesions  Neuro:  normal without focal findings  mental status, speech normal, alert and oriented x iii  NATASHA  reflexes normal and symmetric  Exremities: Mild trace edema     Intake and Output:  Current Shift: No intake/output data recorded.   Last three shifts: 12/19 1901 - 12/21 0700  In: 1521 [P.O.:1521]  Out: 1100 [Urine:1100]    Lab/Data Review:  Recent Results (from the past 12 hour(s))   GLUCOSE, POC    Collection Time: 12/20/20  9:23 PM   Result Value Ref Range    Glucose (POC) 343 (H) 70 - 110 mg/dL   GLUCOSE, POC    Collection Time: 12/20/20  9:24 PM   Result Value Ref Range    Glucose (POC) 352 (H) 70 - 940 mg/dL   METABOLIC PANEL, BASIC    Collection Time: 12/21/20  4:08 AM   Result Value Ref Range    Sodium 135 (L) 136 - 145 mmol/L    Potassium 4.0 3.5 - 5.5 mmol/L    Chloride 99 (L) 100 - 111 mmol/L    CO2 31 21 - 32 mmol/L    Anion gap 5 3.0 - 18 mmol/L    Glucose 207 (H) 74 - 99 mg/dL    BUN 23 (H) 7.0 - 18 MG/DL    Creatinine 0.98 0.6 - 1.3 MG/DL    BUN/Creatinine ratio 23 (H) 12 - 20      GFR est AA >60 >60 ml/min/1.73m2    GFR est non-AA 58 (L) >60 ml/min/1.73m2    Calcium 9.8 8.5 - 10.1 MG/DL   CBC W/O DIFF    Collection Time: 12/21/20  4:08 AM   Result Value Ref Range    WBC 9.6 4.6 - 13.2 K/uL    RBC 4.49 4.20 - 5.30 M/uL    HGB 12.5 12.0 - 16.0 g/dL    HCT 38.3 35.0 - 45.0 %    MCV 85.3 74.0 - 97.0 FL    MCH 27.8 24.0 - 34.0 PG    MCHC 32.6 31.0 - 37.0 g/dL    RDW 13.7 11.6 - 14.5 %    PLATELET 098 544 - 441 K/uL    MPV 9.3 9.2 - 11.8 FL   GLUCOSE, POC    Collection Time: 12/21/20  6:25 AM   Result Value Ref Range    Glucose (POC) 159 (H) 70 - 110 mg/dL       RECENT RESULTS  MODALITY IMPRESSION   XR Results from Hospital Encounter encounter on 12/18/20   XR CHEST PA LAT    Narrative EXAM: XR CHEST PA LAT    CLINICAL INDICATION/HISTORY : SOB.    COMPARISON: August 7, 2020    TECHNIQUE: 2 VIEWS    FINDINGS:     Mild similar streaky opacities at the lung bases which may be due to atelectasis  or scarring. The heart and mediastinum are unremarkable. No evidence of pleural effusion. Bones and soft tissues appear unremarkable        Impression IMPRESSION:    1. No acute findings             CT Results from Hospital Encounter encounter on 11/02/20   CT LOW DOSE LUNG CANCER SCREENING    Narrative CT CHEST LUNG SCREENING    INDICATION: Lung CT screening study requested as patient needs established age  criteria, smoking history requirements, or additional risk factor eligibility  requirements. CT screening study requested as patient meets established  criteria. 48 pack year smoking history. COPD. Technique: Low-dose computed tomography acquisition performed according to the  national comprehensive cancer network guidelines space on body mass index. Axial  raw images obtained. Reconstruction on lung windows and soft tissue windows with  additional coronal and sagittally reformatted series.  No intravenous contrast  administered for this exam.    COMPARISON: CT abdomen/pelvis 8/31/2020, CT chest 2/21/2018    FINDINGS:  Lungs: Right middle lobe atelectasis is similar to 8/31/2020, progressed from  2018. Subsegmental atelectasis in the lingula. Mild bronchial wall thickening. 3  mm right upper lobe nodule (4, 76), not definitely seen on 2018 study. Pleura: No effusion. Lymph Nodes: No lymphadenopathy. Cardiovascular: Moderate calcified atherosclerotic disease, including the  coronary arteries. Bones: Mild degenerative changes in the spine. No suspicious osseous lesions. No  acute osseous abnormality. Visualized upper abdomen is unremarkable. Impression IMPRESSION:    1. Right upper lobe pulmonary nodule measuring 3 mm, not definitely seen on 2018  study. Lung-RADS 2 - Benign appearance or behavior. Management: Continue annual screening with low dose CT in 12 months. 2. Right middle lobe atelectasis is similar to 8/31/2020, progressed from 2018. 3. Mild bronchial wall thickening, likely reflecting history of COPD. MRI No results found for this or any previous visit. ULTRASOUND Results from [place] encounter on 09/28/20   US BREAST RT LIMITED=<3 QUAD    Impression IMPRESSION:  No evidence for malignancy. Patient's palpable areas of concern at  the 12:30-1:00 right breast correlate with benign lipomas. BI-RADS CATEGORY: BI-RADS 2 - Benign    FOLLOW-UP RECOMMENDATIONS: Patient is encouraged to continue regular self  examinations, and should return to clinic should her examination or symptoms  change in any other concerning way. Otherwise, recommend routine follow-up with  annual bilateral screening mammography, for which patient will be due in  September 2021. Thank you for enabling us to participate in the care of this patient. Results from [place] encounter on 04/10/17   US ABD COMP    Impression IMPRESSION:       1. Echogenic mildly enlarged liver, suggestive of hepatic steatosis.  No focal  hepatic lesion identified. 2. Status post cholecystectomy. No biliary ductal dilatation. Cardiology Procedures/Testing:  MODALITY RESULTS   EKG Results for orders placed or performed during the hospital encounter of 12/18/20   EKG, 12 LEAD, INITIAL   Result Value Ref Range    Ventricular Rate 91 BPM    Atrial Rate 91 BPM    P-R Interval 142 ms    QRS Duration 130 ms    Q-T Interval 378 ms    QTC Calculation (Bezet) 464 ms    Calculated P Axis 68 degrees    Calculated R Axis 56 degrees    Calculated T Axis 32 degrees    Diagnosis       Normal sinus rhythm  Right bundle branch block  Possible Inferior infarct , age undetermined  Abnormal ECG  When compared with ECG of 08-AUG-2020 15:30,  premature atrial complexes are no longer present  Right bundle branch block is now present  Minimal criteria for Anterior infarct are no longer present  Borderline criteria for Inferior infarct are now present  Confirmed by Emile Morrow MD, --- ([id number]) on 12/19/2020 9:02:15 AM         ECHO 05/21/20   ECHO ADULT COMPLETE 05/25/2020 5/25/2020    Narrative · Image quality for this study was technically difficult. Contrast used:   DEFINITY. · Normal cavity size and systolic function (ejection fraction normal). Moderate concentric hypertrophy. Estimated left ventricular ejection   fraction is 65 - 70%. Visually measured ejection fraction. No regional   wall motion abnormality noted. Moderate (grade 2) left ventricular   diastolic dysfunction. · Mildly dilated left atrium. · Pulmonary hypertension. Pulmonary arterial systolic pressure is 61 mmHg. · Severely elevated central venous pressure (15+ mmHg); IVC diameter is   larger than 21 mm and collapses less than 50% with respiration. Signed by: Aviva Crawford MD        Special Testing/Procedures:  MODALITY RESULTS   MICRO All Micro Results     None         UA No results found for this or any previous visit.    PATH none       Assessment and Plan: 63 y.o. female with PMH of COPD on home O2 (3L NC), IDDM2, HTN, HFpEF, SHERRIE on CPAP, GERD, allergic rhinitis now admitted with CHF exacerbation.     Respiratory distress 2/2 acute on chronic HFpEF & PHTN   - on home 02 of 3L. Satting well %.    -ECHO (5/24/20) AV 55 - 70%. No regional wall motion abnormality. Moderate (grade 2) left ventricular diastolic dysfunction. Mildly dilated left atrium.  Pulmonary arterial systolic pressure is 61 mmHg.  - NT pro-BNP 50    Plan:      - prednisone 40mg BID ( on home prednisone 5 mg )     -  Cont home aithromycin ( on home azithromycin M,W,F     - held  IV Lasix 40 mg BID ( net -630)      -Today heart cath, pre-medicate prednisone 40 mg BID and benadryl 25 mg     - SHERRIE cont CPAP/BIPAP at night   -   COPD, pulmonary hypertension & SHERRIE- Patient has COPD on max therapy w/ strong asthma component. Hospitalized 6x in 2020 for COPD exacerbations. Followed by pulm (Dr. Ana Luisa Fisher) out patient. Began infusion therapy in September and reports improvement in COPD symptoms since that time. Pulmonary arterial systolic pressure is 61 mmHg.   - duonebs q6 hours prn  - continue Advair and spiriva daily or appropriate substitution per pharmacy   - albuterol nebs prn   - continue home azithromycin M, W, F  - consider pulm consult (well known by pulmonologist here)               - BIPAP at night and PRN, continue in-patient     ROBERTO-  Pt with Cr to 1.34 on admission, baseline 0.98.  Creatinine 1.33 on 12/19              - defer diuresis to cardiology     Insulin dependent type 2 diabetes & morbid obesity- on lantus 45u pm and novolog SSI at home. Last A1C 8.4 in April 2020. Pt on long term prednisone for COPD. Received solumedrol prior to cath.  Initial blood glucose 280 today.   - increase Lantus to 55 units daily (25 in am and 20 at bedtime)  - SSI   - Accuchecks ACHS   - Diabetic diet   - Diabetic educator seen yesterday, appreciate recs      Hypertension  - Continue Lisinopril 20mg BID     GERD: generally well managed, usually asymptomatic.   -Continue protonix 40mg daily     Allergic rhinitis               - continue flonase     Diet Diabetic diet   DVT Prophylaxis SQH   GI Prophylaxis Protonix   Code status FULL   Disposition Home      Point of Contact Bong Vargas  Relationship: Daughter  [phone]        Camilo Cagle PGY-1   [address] [facility]   Pager: [phone]   December 21, 2020, 8:05 AM

## 2020-12-21 NOTE — PROGRESS NOTES
Brief Progress Note    Received call from nursing. Patient to be going to cath lab at sometime this morning. Patient usually on 20 units of Lantus in morning. She has been NPO overnight for cath. Will give 10 units this morning as time of cath is unsure. Last blood sugar 159 at 0625. Call is much appreciated.     Farrah Londono, DO  12/21/20  9:12 AM

## 2020-12-21 NOTE — PROGRESS NOTES
Right wrist R-Band removed, no bleeding or swelling. Sterile hemostatic dressing applied. Normal radial pulse, normal distal circulation and neuro check. Safety splint applied. Safety instructions reviewed with the patient.

## 2020-12-22 VITALS
TEMPERATURE: 97.4 F | DIASTOLIC BLOOD PRESSURE: 71 MMHG | SYSTOLIC BLOOD PRESSURE: 116 MMHG | BODY MASS INDEX: 46.76 KG/M2 | RESPIRATION RATE: 20 BRPM | HEIGHT: 63 IN | OXYGEN SATURATION: 97 % | WEIGHT: 263.89 LBS | HEART RATE: 86 BPM

## 2020-12-22 LAB
ANION GAP SERPL CALC-SCNC: 6 MMOL/L (ref 3–18)
BNP SERPL-MCNC: 55 PG/ML (ref 0–900)
BUN SERPL-MCNC: 19 MG/DL (ref 7–18)
BUN/CREAT SERPL: 20 (ref 12–20)
CALCIUM SERPL-MCNC: 9.7 MG/DL (ref 8.5–10.1)
CHLORIDE SERPL-SCNC: 103 MMOL/L (ref 100–111)
CO2 SERPL-SCNC: 31 MMOL/L (ref 21–32)
CREAT SERPL-MCNC: 0.97 MG/DL (ref 0.6–1.3)
ERYTHROCYTE [DISTWIDTH] IN BLOOD BY AUTOMATED COUNT: 14 % (ref 11.6–14.5)
GLUCOSE BLD STRIP.AUTO-MCNC: 128 MG/DL (ref 70–110)
GLUCOSE BLD STRIP.AUTO-MCNC: 194 MG/DL (ref 70–110)
GLUCOSE SERPL-MCNC: 132 MG/DL (ref 74–99)
HCT VFR BLD AUTO: 39 % (ref 35–45)
HGB BLD-MCNC: 12.7 G/DL (ref 12–16)
MCH RBC QN AUTO: 28 PG (ref 24–34)
MCHC RBC AUTO-ENTMCNC: 32.6 G/DL (ref 31–37)
MCV RBC AUTO: 85.9 FL (ref 74–97)
PLATELET # BLD AUTO: 328 K/UL (ref 135–420)
PMV BLD AUTO: 9.1 FL (ref 9.2–11.8)
POTASSIUM SERPL-SCNC: 3.6 MMOL/L (ref 3.5–5.5)
RBC # BLD AUTO: 4.54 M/UL (ref 4.2–5.3)
SODIUM SERPL-SCNC: 140 MMOL/L (ref 136–145)
WBC # BLD AUTO: 8.1 K/UL (ref 4.6–13.2)

## 2020-12-22 PROCEDURE — 74011000250 HC RX REV CODE- 250: Performed by: STUDENT IN AN ORGANIZED HEALTH CARE EDUCATION/TRAINING PROGRAM

## 2020-12-22 PROCEDURE — 82962 GLUCOSE BLOOD TEST: CPT

## 2020-12-22 PROCEDURE — 36415 COLL VENOUS BLD VENIPUNCTURE: CPT

## 2020-12-22 PROCEDURE — 74011250636 HC RX REV CODE- 250/636: Performed by: STUDENT IN AN ORGANIZED HEALTH CARE EDUCATION/TRAINING PROGRAM

## 2020-12-22 PROCEDURE — 74011636637 HC RX REV CODE- 636/637: Performed by: STUDENT IN AN ORGANIZED HEALTH CARE EDUCATION/TRAINING PROGRAM

## 2020-12-22 PROCEDURE — 83880 ASSAY OF NATRIURETIC PEPTIDE: CPT

## 2020-12-22 PROCEDURE — 85027 COMPLETE CBC AUTOMATED: CPT

## 2020-12-22 PROCEDURE — 80048 BASIC METABOLIC PNL TOTAL CA: CPT

## 2020-12-22 PROCEDURE — 94640 AIRWAY INHALATION TREATMENT: CPT

## 2020-12-22 PROCEDURE — 77010033678 HC OXYGEN DAILY

## 2020-12-22 PROCEDURE — 94761 N-INVAS EAR/PLS OXIMETRY MLT: CPT

## 2020-12-22 PROCEDURE — 99152 MOD SED SAME PHYS/QHP 5/>YRS: CPT | Performed by: INTERNAL MEDICINE

## 2020-12-22 PROCEDURE — 74011250637 HC RX REV CODE- 250/637: Performed by: STUDENT IN AN ORGANIZED HEALTH CARE EDUCATION/TRAINING PROGRAM

## 2020-12-22 PROCEDURE — 74011250636 HC RX REV CODE- 250/636: Performed by: NURSE PRACTITIONER

## 2020-12-22 RX ORDER — FUROSEMIDE 40 MG/1
40 TABLET ORAL
Status: DISCONTINUED | OUTPATIENT
Start: 2020-12-23 | End: 2020-12-22 | Stop reason: HOSPADM

## 2020-12-22 RX ORDER — FUROSEMIDE 20 MG/1
20 TABLET ORAL
Status: DISCONTINUED | OUTPATIENT
Start: 2020-12-22 | End: 2020-12-22 | Stop reason: HOSPADM

## 2020-12-22 RX ORDER — FUROSEMIDE 40 MG/1
40 TABLET ORAL DAILY
Status: DISCONTINUED | OUTPATIENT
Start: 2020-12-23 | End: 2020-12-22

## 2020-12-22 RX ADMIN — IPRATROPIUM BROMIDE: 0.5 SOLUTION RESPIRATORY (INHALATION) at 09:32

## 2020-12-22 RX ADMIN — INSULIN GLARGINE 20 UNITS: 100 INJECTION, SOLUTION SUBCUTANEOUS at 09:03

## 2020-12-22 RX ADMIN — INSULIN LISPRO 3 UNITS: 100 INJECTION, SOLUTION INTRAVENOUS; SUBCUTANEOUS at 12:17

## 2020-12-22 RX ADMIN — Medication 10 ML: at 06:35

## 2020-12-22 RX ADMIN — HEPARIN SODIUM 5000 UNITS: 5000 INJECTION INTRAVENOUS; SUBCUTANEOUS at 12:18

## 2020-12-22 RX ADMIN — FLUTICASONE PROPIONATE 2 SPRAY: 50 SPRAY, METERED NASAL at 09:10

## 2020-12-22 RX ADMIN — BUDESONIDE 1000 MCG: 0.5 SUSPENSION RESPIRATORY (INHALATION) at 09:32

## 2020-12-22 RX ADMIN — ARFORMOTEROL TARTRATE 15 MCG: 15 SOLUTION RESPIRATORY (INHALATION) at 09:32

## 2020-12-22 RX ADMIN — LISINOPRIL 20 MG: 20 TABLET ORAL at 09:02

## 2020-12-22 RX ADMIN — HEPARIN SODIUM 5000 UNITS: 5000 INJECTION INTRAVENOUS; SUBCUTANEOUS at 04:46

## 2020-12-22 RX ADMIN — FUROSEMIDE 40 MG: 10 INJECTION, SOLUTION INTRAMUSCULAR; INTRAVENOUS at 09:02

## 2020-12-22 RX ADMIN — Medication 81 MG: at 09:02

## 2020-12-22 RX ADMIN — PANTOPRAZOLE SODIUM 40 MG: 40 TABLET, DELAYED RELEASE ORAL at 07:54

## 2020-12-22 NOTE — PROGRESS NOTES
[facility]  Progress Note    Patient: Amanda Ly MRN: [id number]   SSN: [id number]  YOB: 1960   Age: 63 y.o. Sex: female      Admit Date: 12/18/2020    LOS: 4 days   No chief complaint on file. Subjective:     Pt breathing stable. Pt states that she has some burning sensation in chest.    ROS   No abdominal pain  No diarrhea, No fever/chills    Objective:     Visit Vitals  /78 (BP 1 Location: Left arm, BP Patient Position: At rest)   Pulse 75   Temp 97.7 °F (36.5 °C)   Resp 20   Ht 5' 3\" (1.6 m)   Wt 119.7 kg (263 lb 14.3 oz)   SpO2 97%   Breastfeeding No   BMI 46.75 kg/m²       Physical Exam:   General appearance: alert, cooperative, no distress, appears stated age  Lungs: clear to auscultation bilaterally  Heart: regular rate and rhythm, S1, S2 normal, no murmur, click, rub or gallop  Abdomen: soft, non-tender.  Bowel sounds normal. No masses,  no organomegaly  Pulses: 2+ and symmetric  Skin: Skin color, texture, turgor normal. No rashes or lesions  Neuro:  normal without focal findings  mental status, speech normal, alert and oriented x iii  NATASHA  reflexes normal and symmetric  Exremities: No edema, Pt right hand no hematoma     Intake and Output:  Current Shift: 12/22 0701 - 12/22 1900  In: 120 [P.O.:120]  Out: -   Last three shifts: 12/20 1901 - 12/22 0700  In: 710 [P.O.:710]  Out: 2100 [Urine:2100]    Lab/Data Review:  Recent Results (from the past 12 hour(s))   METABOLIC PANEL, BASIC    Collection Time: 12/22/20  3:16 AM   Result Value Ref Range    Sodium 140 136 - 145 mmol/L    Potassium 3.6 3.5 - 5.5 mmol/L    Chloride 103 100 - 111 mmol/L    CO2 31 21 - 32 mmol/L    Anion gap 6 3.0 - 18 mmol/L    Glucose 132 (H) 74 - 99 mg/dL    BUN 19 (H) 7.0 - 18 MG/DL    Creatinine 0.97 0.6 - 1.3 MG/DL    BUN/Creatinine ratio 20 12 - 20      GFR est AA >60 >60 ml/min/1.73m2    GFR est non-AA 58 (L) >60 ml/min/1.73m2    Calcium 9.7 8.5 - 10.1 MG/DL   CBC W/O DIFF    Collection Time: 12/22/20  3:16 AM   Result Value Ref Range    WBC 8.1 4.6 - 13.2 K/uL    RBC 4.54 4.20 - 5.30 M/uL    HGB 12.7 12.0 - 16.0 g/dL    HCT 39.0 35.0 - 45.0 %    MCV 85.9 74.0 - 97.0 FL    MCH 28.0 24.0 - 34.0 PG    MCHC 32.6 31.0 - 37.0 g/dL    RDW 14.0 11.6 - 14.5 %    PLATELET 371 983 - 260 K/uL    MPV 9.1 (L) 9.2 - 11.8 FL   GLUCOSE, POC    Collection Time: 12/22/20  6:17 AM   Result Value Ref Range    Glucose (POC) 128 (H) 70 - 110 mg/dL       RECENT RESULTS  MODALITY IMPRESSION   XR Results from [place] encounter on 12/18/20   XR CHEST PA LAT    Narrative EXAM: XR CHEST PA LAT    CLINICAL INDICATION/HISTORY : SOB.    COMPARISON: August 7, 2020    TECHNIQUE: 2 VIEWS    FINDINGS:     Mild similar streaky opacities at the lung bases which may be due to atelectasis  or scarring. The heart and mediastinum are unremarkable. No evidence of pleural effusion. Bones and soft tissues appear unremarkable        Impression IMPRESSION:    1. No acute findings             CT Results from Hospital Encounter encounter on 11/02/20   CT LOW DOSE LUNG CANCER SCREENING    Narrative CT CHEST LUNG SCREENING    INDICATION: Lung CT screening study requested as patient needs established age  criteria, smoking history requirements, or additional risk factor eligibility  requirements. CT screening study requested as patient meets established  criteria. 48 pack year smoking history. COPD. Technique: Low-dose computed tomography acquisition performed according to the  national comprehensive cancer network guidelines space on body mass index. Axial  raw images obtained. Reconstruction on lung windows and soft tissue windows with  additional coronal and sagittally reformatted series. No intravenous contrast  administered for this exam.    COMPARISON: CT abdomen/pelvis 8/31/2020, CT chest 2/21/2018    FINDINGS:  Lungs: Right middle lobe atelectasis is similar to 8/31/2020, progressed from  2018. Subsegmental atelectasis in the lingula. Mild bronchial wall thickening. 3  mm right upper lobe nodule (4, 76), not definitely seen on 2018 study. Pleura: No effusion. Lymph Nodes: No lymphadenopathy. Cardiovascular: Moderate calcified atherosclerotic disease, including the  coronary arteries. Bones: Mild degenerative changes in the spine. No suspicious osseous lesions. No  acute osseous abnormality. Visualized upper abdomen is unremarkable. Impression IMPRESSION:    1. Right upper lobe pulmonary nodule measuring 3 mm, not definitely seen on 2018  study. Lung-RADS 2 - Benign appearance or behavior. Management: Continue annual screening with low dose CT in 12 months. 2. Right middle lobe atelectasis is similar to 8/31/2020, progressed from 2018. 3. Mild bronchial wall thickening, likely reflecting history of COPD. MRI No results found for this or any previous visit. ULTRASOUND Results from [place] encounter on 09/28/20   US BREAST RT LIMITED=<3 QUAD    Impression IMPRESSION:  No evidence for malignancy. Patient's palpable areas of concern at  the 12:30-1:00 right breast correlate with benign lipomas. BI-RADS CATEGORY: BI-RADS 2 - Benign    FOLLOW-UP RECOMMENDATIONS: Patient is encouraged to continue regular self  examinations, and should return to clinic should her examination or symptoms  change in any other concerning way. Otherwise, recommend routine follow-up with  annual bilateral screening mammography, for which patient will be due in  September 2021. Thank you for enabling us to participate in the care of this patient. Results from [place] encounter on 04/10/17   US ABD COMP    Impression IMPRESSION:       1. Echogenic mildly enlarged liver, suggestive of hepatic steatosis. No focal  hepatic lesion identified. 2. Status post cholecystectomy. No biliary ductal dilatation.         Cardiology Procedures/Testing:  MODALITY RESULTS   EKG Results for orders placed or performed during the hospital encounter of 12/18/20   EKG, 12 LEAD, INITIAL   Result Value Ref Range    Ventricular Rate 91 BPM    Atrial Rate 91 BPM    P-R Interval 142 ms    QRS Duration 130 ms    Q-T Interval 378 ms    QTC Calculation (Bezet) 464 ms    Calculated P Axis 68 degrees    Calculated R Axis 56 degrees    Calculated T Axis 32 degrees    Diagnosis       Normal sinus rhythm  Right bundle branch block  Possible Inferior infarct , age undetermined  Abnormal ECG  When compared with ECG of 08-AUG-2020 15:30,  premature atrial complexes are no longer present  Right bundle branch block is now present  Minimal criteria for Anterior infarct are no longer present  Borderline criteria for Inferior infarct are now present  Confirmed by Sheri Mendoza MD, --- ([id number]) on 12/19/2020 9:02:15 AM         ECHO 05/21/20   ECHO ADULT COMPLETE 05/25/2020 5/25/2020    Narrative · Image quality for this study was technically difficult. Contrast used:   DEFINITY. · Normal cavity size and systolic function (ejection fraction normal). Moderate concentric hypertrophy. Estimated left ventricular ejection   fraction is 65 - 70%. Visually measured ejection fraction. No regional   wall motion abnormality noted. Moderate (grade 2) left ventricular   diastolic dysfunction. · Mildly dilated left atrium. · Pulmonary hypertension. Pulmonary arterial systolic pressure is 61 mmHg. · Severely elevated central venous pressure (15+ mmHg); IVC diameter is   larger than 21 mm and collapses less than 50% with respiration. Signed by: Renu Alarcon MD        Special Testing/Procedures:  MODALITY RESULTS   MICRO All Micro Results     None         UA No results found for this or any previous visit.    PATH none       Assessment and Plan:     63 y.o. female with PMH of COPD on home O2 (3L NC), IDDM2, HTN, HFpEF, SHERRIE on CPAP, GERD, allergic rhinitis now admitted with CHF exacerbation.     Respiratory distress 2/2 acute on chronic HFpEF & PHTN   - on home 02 of 3L. Satting well %.    -ECHO (5/24/20) BR 19 - 70%. No regional wall motion abnormality. Moderate (grade 2) left ventricular diastolic dysfunction. Mildly dilated left atrium.  Pulmonary arterial systolic pressure is 61 mmHg.  - NT pro-BNP 50    Plan:      - consider starting back prednisone 5 mg )     -  Cont home aithromycin ( on home azithromycin M,W,F     -cont  IV Lasix 40 mgdaily ( UOP 1L net -760)      - SHERRIE cont CPAP/BIPAP at night   -   COPD, pulmonary hypertension & SHERRIE- Patient has COPD on max therapy w/ strong asthma component. Hospitalized 6x in 2020 for COPD exacerbations. Followed by pulm (Dr. Adrianna Bell) out patient. Began infusion therapy in September and reports improvement in COPD symptoms since that time. Pulmonary arterial systolic pressure is 61 mmHg.   - duonebs q6 hours prn  - continue Advair and spiriva daily or appropriate substitution per pharmacy   - albuterol nebs prn   - continue home azithromycin M, W, F  - consider pulm consult (well known by pulmonologist here)               - BIPAP at night and PRN, continue in-patient     ROBERTO-  Pt with Cr to 1.34 on admission, baseline 0.98.  Creatinine 1.33 on 12/19              - defer diuresis to cardiology     Insulin dependent type 2 diabetes & morbid obesity- on lantus 45u pm and novolog SSI at home. Last A1C 8.4 in April 2020. Pt on long term prednisone for COPD. Received solumedrol prior to cath.  Initial blood glucose 280 today.   - increase Lantus to 55 units daily (25 in am and 20 at bedtime)  - SSI   - Accuchecks ACHS   - Diabetic diet   - Diabetic educator seen yesterday, appreciate recs      Hypertension  - Continue Lisinopril 20mg BID     GERD: generally well managed, usually asymptomatic.   -Continue protonix 40mg daily     Allergic rhinitis               - continue flonase     Diet Diabetic diet   DVT Prophylaxis SQH   GI Prophylaxis Protonix   Code status FULL   Disposition Home      Point of Contact Aunjoann Vanegas  Relationship: Daughter  [phone]        Atilio Cardoso PGY-1   [address] [facility]   Pager: [phone]   December 22, 2020, 8:05 AM

## 2020-12-22 NOTE — PROGRESS NOTES
Bedside shift change report given to SONI Lerma (oncoming nurse) by SHELLIE Terrell RN (offgoing nurse). Report included the following information SBAR, MAR and Recent Results.

## 2020-12-22 NOTE — PROGRESS NOTES
[facility]      CARDIOLOGY PROGRESS NOTE  RECS:      1. Shortness of breath - improved well. Lasix iv changed to po   2. Acute on chronic diastolic CHF - EF 83-62% by recent echo- compensated now. 3. ROBERTO - resolved. 4. COPD - continue antibiotics and supportive care per hospitalist team.  5. Pulmonary Hypertension - PAP 61 mm Hg by echo 5/2020 - continue CPAP at night. S/p Right and left heart catheterization. 6. Hypertension - controlled. 7. DM II - uncontrolled, A1C 9.0 - management per hospitalist.   8. RBBB - new on EKG  9. Hyperlipidemia -  - intolerant to statins per notes      Ok to d/c home follow in office in 1-2 weeks Dr. Mey De Santiago:  Hospital Problems  Date Reviewed: 8/19/2020          Codes Class Noted POA    * (Principal) Heart failure ([facility] ICD-10-CM: I50.9  ICD-9-CM: 428.9  12/18/2020 Unknown        CHF (congestive heart failure) ([facility] ICD-10-CM: I50.9  ICD-9-CM: 428.0  12/18/2020 Unknown                SUBJECTIVE:  No CP   SOB improved    OBJECTIVE:    VS:   Visit Vitals  /78 (BP 1 Location: Left arm, BP Patient Position: At rest)   Pulse 75   Temp 97.7 °F (36.5 °C)   Resp 20   Ht 5' 3\" (1.6 m)   Wt 119.7 kg (263 lb 14.3 oz)   SpO2 98%   Breastfeeding No   BMI 46.75 kg/m²         Intake/Output Summary (Last 24 hours) at 12/22/2020 1122  Last data filed at 12/22/2020 0558  Gross per 24 hour   Intake 360 ml   Output 700 ml   Net -340 ml     TELE: normal sinus rhythm    General: alert, cooperative and in no apparent distress  HENT: Normocephalic, atraumatic. Normal external eye. Neck :  JVD difficult to assess due to obesity  Cardiac:  regular rate and rhythm, S1, S2 normal, no murmur, click, rub or gallop  Lungs: diminished breath sounds b/l.    Abdomen: Soft, nontender, no masses  Extremities: trace edema ble., peripheral pulses present      Labs: Results:       Chemistry Recent Labs     12/22/20  0316 12/21/20  0408 12/20/20  0032 * 207* 261*    135* 135*   K 3.6 4.0 4.0    99* 99*   CO2 31 31 30   BUN 19* 23* 28*   CREA 0.97 0.98 1.23   CA 9.7 9.8 9.9   AGAP 6 5 6   BUCR 20 23* 23*      CBC w/Diff Recent Labs     12/22/20  0316 12/21/20  0408 12/20/20  0032   WBC 8.1 9.6 13.8*   RBC 4.54 4.49 4.48   HGB 12.7 12.5 12.6   HCT 39.0 38.3 38.3    351 378      Cardiac Enzymes No results for input(s): CPK, CKND1, KESHIA in the last 72 hours. No lab exists for component: CKRMB, TROIP   Coagulation No results for input(s): PTP, INR, APTT, INREXT, INREXT in the last 72 hours. Lipid Panel Lab Results   Component Value Date/Time    Cholesterol, total 260 (H) 12/20/2020 12:32 AM    HDL Cholesterol 55 12/20/2020 12:32 AM    LDL, calculated 146.8 (H) 12/20/2020 12:32 AM    VLDL, calculated 58.2 12/20/2020 12:32 AM    Triglyceride 291 (H) 12/20/2020 12:32 AM    CHOL/HDL Ratio 4.7 12/20/2020 12:32 AM      BNP No results for input(s): BNPP in the last 72 hours. Liver Enzymes No results for input(s): TP, ALB, TBIL, AP in the last 72 hours.     No lab exists for component: SGOT, GPT, DBIL   Thyroid Studies Lab Results   Component Value Date/Time    TSH 2.29 12/09/2020 03:19 PM                MARCUS Lynn

## 2020-12-22 NOTE — PROGRESS NOTES
Bedside shift change report given to SONI Wilhelm RN (oncoming nurse) by C. Montell Bamberger, RN (offgoing nurse). Report included the following information SBAR.

## 2020-12-22 NOTE — PROGRESS NOTES
Family Medicine  Brief Progress Note  SUBJECTIVE  Pt seen at bedside. Resting comfortably, eating dinner. Reports improvement in chest burning but still slightly present. No other issues reported. Pt denies headaches, fevers/chills, CP/SOB, abdominal pain, N/V, dysuria/hesitancy, and diarrhea/constipation. OBJECTIVE  Visit Vitals  BP (!) 171/71   Pulse 80   Temp 98 °F (36.7 °C)   Resp 27   Ht 5' 3\" (1.6 m)   Wt 119.7 kg (263 lb 12.8 oz)   SpO2 99%   Breastfeeding No   BMI 46.73 kg/m²       Recent Results (from the past 12 hour(s))   GLUCOSE, POC    Collection Time: 12/21/20  6:54 PM   Result Value Ref Range    Glucose (POC) 230 (H) 70 - 110 mg/dL       Physical examination  Consitutional: NAD, AAO  Cardiovascular: RRR, no m/g/r  Respiratory: CTA b/l; good respiratory effort  Abdominal: Abdomen soft, NT/ND  Extremities: No edema, no cyanosis    ASSESSMENT/PLAN  VSS. No change in management at this time.     Judah Nassar DO, PGY-1   [facility] Family Medicine   Intern Pager: [phone]   December 21, 2020, 7:27 PM

## 2020-12-22 NOTE — DIABETES MGMT
Glycemic Control Plan of Care    Completed higher doses of prednisone - to resume her maintenance dose of 5 mg daily  BG improved -  mg/dl this morning  Continues with basal and corrective insulins  She states she plans to d/c home later today  To resume her home doses of insulin at discharge.       Your A1C  was   Lab Results   Component Value Date/Time    Hemoglobin A1c 9.0 (H) 12/18/2020 05:30 PM     TDD previous day = 55 units  40 units lantus  15 units lispro  Current hospital diabetes medications:  Lantus 20 units AM  Lantus 30 units PM  Corrective lispro with meals  Home diabetes medications:  Lantus 45 units nightly  Novolog with meals - sliding scale      Pippa Medina MPH RN CDE  Pager [phone]

## 2020-12-22 NOTE — PROGRESS NOTES
1915: Assumed patient care from [facility]. Patient is alert and oriented to person, place, time and situation. Respiratory status is stable on 2L via nasal cannula. Vital signs are stable. MEWS score is a one. Patient heraclio any pain, discomfort, nausea vomiting dizziness or anxiety. White board and fall card is updated. Bed is locked and in lowest position. Call bell, water and personal belongings are within reach. Patient has no questions, comments or concerns after bedside shift report. 0700: Patient had an uneventful shift. Respiratory status, vital signs and MEWS score remained stable. Patient was resting quietly with no signs of distress noted. Bed locked and in lowest position. Call bell water and personal belongings were within reach. Patient had no questions, comments or concerns after bedside shift report.  Bedside report given to Graciela Estrada R.N.

## 2020-12-22 NOTE — PROGRESS NOTES
D/C orders received. No needs identified by . Chart reviewed. Pt will be transported home by family.  available as needed. Patient refusing Home health at this time.      Camille Thompson, RN BSN  Care Manager  [phone]

## 2020-12-22 NOTE — ROUTINE PROCESS
TRANSFER - OUT REPORT:    Verbal report given to Dagmar Patrick RN(name) on Brice Stanford  being transferred to [facility](unit) for routine progression of care       Report consisted of patients Situation, Background, Assessment and   Recommendations(SBAR). Information from the following report(s) SBAR was reviewed with the receiving nurse. Lines:   Peripheral IV 12/21/20 Left Wrist (Active)   Site Assessment Clean, dry, & intact 12/21/20 0859   Phlebitis Assessment 0 12/21/20 0859   Infiltration Assessment 0 12/21/20 0859   Dressing Status Clean, dry, & intact 12/21/20 0859   Dressing Type Transparent 12/21/20 0859   Hub Color/Line Status Blue;Flushed 12/21/20 0859   Alcohol Cap Used Yes 12/21/20 0859       Peripheral IV 12/21/20 Right Arm (Active)   Site Assessment Clean, dry, & intact 12/21/20 0859   Phlebitis Assessment 0 12/21/20 0859   Infiltration Assessment 0 12/21/20 0859   Dressing Status Clean, dry, & intact 12/21/20 0859   Dressing Type Transparent 12/21/20 0859   Hub Color/Line Status Blue;Flushed 12/21/20 0859   Alcohol Cap Used Yes 12/21/20 0859        Opportunity for questions and clarification was provided. Patient transported with:   Registered Nurse: PEYTON Joy

## 2020-12-22 NOTE — DISCHARGE INSTRUCTIONS
Patient armband removed and shredded  MyChart Activation    Thank you for requesting access to [employer]. Please follow the instructions below to securely access and download your online medical record. [employer] allows you to send messages to your doctor, view your test results, renew your prescriptions, schedule appointments, and more. How Do I Sign Up? 1. In your internet browser, go to [email or web address]  2. Click on the First Time User? Click Here link in the Sign In box. You will be redirect to the New Member Sign Up page. 3. Enter your [employer] Access Code exactly as it appears below. You will not need to use this code after youve completed the sign-up process. If you do not sign up before the expiration date, you must request a new code. [employer] Access Code: Activation code not generated  Current [employer] Status: Active (This is the date your [employer] access code will )    4. Enter the last four digits of your Social Security Number (xxxx) and Date of Birth (mm/dd/yyyy) as indicated and click Submit. You will be taken to the next sign-up page. 5. Create a [employer] ID. This will be your [employer] login ID and cannot be changed, so think of one that is secure and easy to remember. 6. Create a [employer] password. You can change your password at any time. 7. Enter your Password Reset Question and Answer. This can be used at a later time if you forget your password. 8. Enter your e-mail address. You will receive e-mail notification when new information is available in [address] 9. Click Sign Up. You can now view and download portions of your medical record. 10. Click the Download Summary menu link to download a portable copy of your medical information. Additional Information    If you have questions, please visit the Frequently Asked Questions section of the [employer] website at [email or web address]. [email or web address] Remember, [employer] is NOT to be used for urgent needs.  For medical emergencies, dial 911. DISCHARGE SUMMARY from Nurse    PATIENT INSTRUCTIONS:    After general anesthesia or intravenous sedation, for 24 hours or while taking prescription Narcotics:  · Limit your activities  · Do not drive and operate hazardous machinery  · Do not make important personal or business decisions  · Do  not drink alcoholic beverages  · If you have not urinated within 8 hours after discharge, please contact your surgeon on call. Report the following to your surgeon:  · Excessive pain, swelling, redness or odor of or around the surgical area  · Temperature over 100.5  · Nausea and vomiting lasting longer than 4 hours or if unable to take medications  · Any signs of decreased circulation or nerve impairment to extremity: change in color, persistent  numbness, tingling, coldness or increase pain  · Any questions    What to do at Home:  Recommended activity: Activity as tolerated    If you experience any of the following symptoms shortness of breath, chest pain, please follow up with nearest emergency room, your primary care physician. *  Please give a list of your current medications to your Primary Care Provider. *  Please update this list whenever your medications are discontinued, doses are      changed, or new medications (including over-the-counter products) are added. *  Please carry medication information at all times in case of emergency situations. These are general instructions for a healthy lifestyle:    No smoking/ No tobacco products/ Avoid exposure to second hand smoke  Surgeon General's Warning:  Quitting smoking now greatly reduces serious risk to your health.     Obesity, smoking, and sedentary lifestyle greatly increases your risk for illness    A healthy diet, regular physical exercise & weight monitoring are important for maintaining a healthy lifestyle    You may be retaining fluid if you have a history of heart failure or if you experience any of the following symptoms:  Weight gain of 3 pounds or more overnight or 5 pounds in a week, increased swelling in our hands or feet or shortness of breath while lying flat in bed. Please call your doctor as soon as you notice any of these symptoms; do not wait until your next office visit. The discharge information has been reviewed with the patient. The patient verbalized understanding. Discharge medications reviewed with the patient and appropriate educational materials and side effects teaching were provided.   ___________________________________________________________________________________________________________________________________

## 2020-12-23 ENCOUNTER — HOSPITAL ENCOUNTER (OUTPATIENT)
Dept: INFUSION THERAPY | Age: 60
Discharge: HOME | End: 2020-12-23
Payer: MEDICARE

## 2020-12-23 VITALS
HEART RATE: 86 BPM | DIASTOLIC BLOOD PRESSURE: 73 MMHG | OXYGEN SATURATION: 98 % | TEMPERATURE: 97.3 F | RESPIRATION RATE: 18 BRPM | SYSTOLIC BLOOD PRESSURE: 152 MMHG

## 2020-12-23 DIAGNOSIS — J45.51 SEVERE PERSISTENT ASTHMA WITH EXACERBATION: Primary | ICD-10-CM

## 2020-12-23 PROCEDURE — 74011250636 HC RX REV CODE- 250/636: Performed by: INTERNAL MEDICINE

## 2020-12-23 PROCEDURE — 96372 THER/PROPH/DIAG INJ SC/IM: CPT

## 2020-12-23 RX ADMIN — BENRALIZUMAB 30 MG: 30 INJECTION, SOLUTION SUBCUTANEOUS at 10:35

## 2020-12-23 NOTE — PROGRESS NOTES
[facility]IC Progress Note    Date: 2020    Name: Gilda Bauer    MRN: [id number]         : 1960    Sean Riggs arrived to [facility] at 56. Ms. Sierra Riggs was assessed and education was provided. Ms. Hebert Harrington vitals were reviewed. Visit Vitals  BP (!) 152/73 (BP 1 Location: Left arm, BP Patient Position: Sitting)   Pulse 86   Temp 97.3 °F (36.3 °C)   Resp 18   SpO2 98%   Breastfeeding No       Fasenra 30 mg was administered as ordered SQ in patient's left upper arm. Band aid applied to site. Ms. Sierra Riggs tolerated well without complaints. Pt's armband removed and shredded. Ms. Sierra Riggs was discharged from [place] [address] in stable condition at 1045. She is to return on 21 at 56 for her next appointment.     Severiano Siskin, RN  2020

## 2020-12-24 DIAGNOSIS — J45.51 SEVERE PERSISTENT ASTHMA WITH EXACERBATION: ICD-10-CM

## 2020-12-26 NOTE — PROGRESS NOTES
Physician Progress Note      PATIENT:               Kiet Juan  CSN #:                  [id number]  :                       1960  ADMIT DATE:       2020 9:16 AM  DISCH DATE:        2020 4:05 PM  RESPONDING  PROVIDER #:        SHANE Olguin MD          QUERY TEXT:    Dear Dr Tommy Smith, Dr Juan Doe and PFM MDs    Pt admitted with CHF. Pt noted to have Home O2 3l. If possible, please document in the progress notes and discharge summary if you are evaluating and/or treating any of the following: The medical record reflects the following:  Risk Factors: 63 y. o.?female?with PMH of HFpEF,? COPD on 3LNC home 02, T2DM, HTN, SHERRIE on? Triology Avaps/BiPAP, GERD,? Allergic? Rhinitis, now with complaint of? SOB.  ? Clinical Indicators: MD SHEIKH Respiratory distress 2/2 acute on chronic? HFpEF?&? PHTN on home 02 of 3L. Satting well  percent? ECHO 20 EF?65 - 70 percent  . No regional wall motion abnormality. ?Moderate (grade 2) left ventricular diastolic dysfunction. Mildly dilated left atrium. ?Pulmonary arterial systolic pressure is 61 mmHg. Treatment: Home:  O2 3L/nc , duonebs q6?hours?prn,BIPAP at night and PRN, continue in-patient continue Advair and spiriva?daily, consider pulm consult      Thank you  Kayla Martinez RN     Options provided:  -- Chronic respiratory failure with hypoxia  -- Chronic respiratory failure with hypercapnia  -- Acute on chronic respiratory failure with hypoxia  -- Acute on chronic respiratory failure with hypercapnia  -- Other - I will add my own diagnosis  -- Disagree - Not applicable / Not valid  -- Disagree - Clinically unable to determine / Unknown  -- Refer to Clinical Documentation Reviewer    PROVIDER RESPONSE TEXT:    This patient is in acute on chronic respiratory failure with hypoxia. Query created by:  Nano Ballard on 2020 11:13 AM      Electronically signed by:  Jacoby Olguin MD 2020 12:04 AM

## 2020-12-28 ENCOUNTER — PATIENT OUTREACH (OUTPATIENT)
Dept: CASE MANAGEMENT | Age: 60
End: 2020-12-28

## 2020-12-28 RX ORDER — DENOSUMAB 60 MG/ML
INJECTION SUBCUTANEOUS
COMMUNITY
Start: 2020-11-23 | End: 2022-09-23

## 2020-12-28 NOTE — PROGRESS NOTES
Patient was admitted to [facility] on 20 and discharged on 20 for acute on chronic respiratory failure secondary to acute on chronic exacerbation HF (cath for recurrent CHF and CP). Outreach made within 2 business days of discharge: No; was notified of d/c 6 days post d/c    Top Discharge Challenges to be reviewed by the provider   Additional needs identified to be addressed with provider no  none  Discussed COVID-19 related testing which was not done at this time. Test results were not done. Patient informed of results, if available? N/A   Method of communication with provider : none       Advance Care Planning:   Does patient have an Advance Directive:  not on file    Inpatient Readmission Risk score: 98%  Was this a readmission? no   Patient stated reason for the admission: N/A  Patients top risk factors for readmission: N/A  Interventions to address risk factors: N/A    Care Transition Nurse (CTN) contacted the patient by telephone to perform post hospital discharge assessment. Verified name and  with patient as identifiers. Provided introduction to self, and explanation of the CTN role. Patient reported she has been breathing much better since starting infusions. Patient looking forward to starting pulmonary rehab. Per patient, she is not on a fluid restriction. Patient very knowledgeable about medical condition. Patient was able to verbalize proper technique for daily weights; today's weight 261 lbs (on home scale). Denied redness, swelling or drainage at cath sites. CTN reviewed discharge instructions, medical action plan and red flags with patient who verbalized understanding. Patient given an opportunity to ask questions and does not have any further questions or concerns at this time. The patient agrees to contact the PCP office for questions related to their healthcare.      Medication reconciliation was performed with patient, who verbalizes understanding of administration of home medications. Advised obtaining a 90-day supply of all daily and as-needed medications. Referral to Pharm D needed: no     Home Health/Outpatient orders at discharge: refused     Durable Medical Equipment ordered at discharge: None    Covid Risk Education  Patient has following risk factors of: heart failure, COPD, asthma and diabetes. Education provided regarding infection prevention, and signs and symptoms of COVID-19 and when to seek medical attention with patient who verbalized understanding. Discussed exposure protocols and quarantine From CDC: Are you at higher risk for severe illness?  and given an opportunity for questions and concerns. The patient agrees to contact the COVID-19 hotline [phone] or PCP office for questions related to COVID-19. For more information on steps you can take to protect yourself, see CDC's How to Protect Yourself     Patient/family/caregiver given information for GetWell Loop and agrees to enroll no      Discussed follow-up appointments. If no appointment was previously scheduled, appointment scheduling offered: N/A  [facility] follow up appointment(s):   Future Appointments   Date Time Provider Lisa Mixon   1/18/2021  9:00 AM Shefali Mccormack MD BS[facility] BS AMB   1/21/2021  3:45 PM Sotero Narvaez MD CAP BS AMB   2/17/2021 10:30 AM HBV FAST TRACK NURSE [facility] HBV   11/3/2021  9:30 AM [facility] CT  2 [facility]     Non-[facility] follow up appointment(s): Dr. Moose Swift (PCP) 12/28/20 @ 10:20 AM  Plan for follow-up call in 10-14 days based on severity of symptoms and risk factors. CTN provided contact information for future needs.     Goals Addressed    None

## 2021-01-06 ENCOUNTER — PATIENT OUTREACH (OUTPATIENT)
Dept: CASE MANAGEMENT | Age: 61
End: 2021-01-06

## 2021-01-06 NOTE — PROGRESS NOTES
Transitions of Care Coordination  Follow-Up    Date/Time:  2021 11:06 AM     CTN (Care Transitions Nurse) contacted patient for Transitions of Care Coordination  follow up. Verified 2 patient identifiers (name and ). Spoke to patient. Introduced self/role and reason for call. Patient reported:   Had cataract surgery today  Swelling to legs/feet but no worse   5 lbs weight gain X 2 days; taking fluid pill    Pertinent negatives: Increased/worsening SOB  Dizziness  CP  Cough     Patient reported she was seen at PCP office on  and was told her lungs sounds better than they had been. Patient voiced she had EKG prior to surgery and heart looked good. Specialist appointments since last outreach? no  If so, specialist and date: N/A    Medications:   New medications since last outreach: no  Does patient need refills on any medications: no  Medication changes since last outreach (dose adjustments or discontinued meds): no    Home Health company: N/A    Barriers to care? None        Patient reminded that there are physicians on call 24 hours a day / 7 days a week (M-F 5pm to 8am and from Friday 5pm until Monday 8a for the weekend) should the patient have questions or concerns. Future Appointments   Date Time Provider Lisa Mixon   2021  9:00 AM Quincy Leach MD [facility] BS AMB   2021  3:45 PM Alin Spicer MD CAP BS AMB   2021 10:30 AM HBV FAST TRACK NURSE [facility] HBV   11/3/2021  9:30 AM [facility] CT RM 2 [facility]        Other upcoming appointments:  N/A    Goals      Attends follow-up appointments as directed. Goal: Patient will attend all appointments scheduled within the next 30 days. Ensure provider appt is scheduled within 7 days post-discharge; Patient was seen at PCP office on  as scheduled. Patient aware of upcoming appts with pulm and cardiology    Complete post-visit call to confirm attendance and update care needs.  Done          Prevent complications post hospitalization. Goal: No admissions post 30 days from discharge of 12/22/20.       Review/educate common or potential \"red flags\" of condition worsening;    Writer educated/reviewed the following s/s of CHF exacerbation to monitor and report:    Symptoms:  Remember WORSEN  W=Weight gain (more than 3 pounds in one day or 5 pounds in one week, weigh every morning with no clothes and record your weight)  O=Orthopnea (difficulty breathing while lying flat, may need to sleep with extra pillows or in a recliner)  R=Resting more than usual  S=Shortness of breath (with activity and at rest)  E=Edema (swelling in lower legs, abdomen, scrotum)  N=Non-productive cough (a cough is not always a cold, it may mean fluid is backing up into your lungs)   Evaluate adherence to medications and priority barriers to resolve; None    Observe for trends in symptoms and measures, provide direction to patient, and notify provider as needed       Discuss and provide resources for ACP; Not on file; will address at later time

## 2021-01-18 ENCOUNTER — OFFICE VISIT (OUTPATIENT)
Dept: PULMONOLOGY | Age: 61
End: 2021-01-18
Payer: MEDICARE

## 2021-01-18 VITALS
SYSTOLIC BLOOD PRESSURE: 156 MMHG | WEIGHT: 268 LBS | DIASTOLIC BLOOD PRESSURE: 63 MMHG | RESPIRATION RATE: 20 BRPM | HEART RATE: 86 BPM | TEMPERATURE: 97.5 F | BODY MASS INDEX: 47.48 KG/M2 | OXYGEN SATURATION: 94 % | HEIGHT: 63 IN

## 2021-01-18 DIAGNOSIS — Z79.52 SEVERE PERSISTENT ASTHMA DEPENDENT ON SYSTEMIC STEROIDS: Primary | ICD-10-CM

## 2021-01-18 DIAGNOSIS — R06.09 DYSPNEA ON EXERTION: ICD-10-CM

## 2021-01-18 DIAGNOSIS — J44.9 COPD WITH ASTHMA (HCC): ICD-10-CM

## 2021-01-18 DIAGNOSIS — J45.50 SEVERE PERSISTENT ASTHMA DEPENDENT ON SYSTEMIC STEROIDS: Primary | ICD-10-CM

## 2021-01-18 PROCEDURE — G8754 DIAS BP LESS 90: HCPCS | Performed by: INTERNAL MEDICINE

## 2021-01-18 PROCEDURE — G8753 SYS BP > OR = 140: HCPCS | Performed by: INTERNAL MEDICINE

## 2021-01-18 PROCEDURE — 3017F COLORECTAL CA SCREEN DOC REV: CPT | Performed by: INTERNAL MEDICINE

## 2021-01-18 PROCEDURE — G8427 DOCREV CUR MEDS BY ELIG CLIN: HCPCS | Performed by: INTERNAL MEDICINE

## 2021-01-18 PROCEDURE — 1111F DSCHRG MED/CURRENT MED MERGE: CPT | Performed by: INTERNAL MEDICINE

## 2021-01-18 PROCEDURE — 99215 OFFICE O/P EST HI 40 MIN: CPT | Performed by: INTERNAL MEDICINE

## 2021-01-18 PROCEDURE — G9899 SCRN MAM PERF RSLTS DOC: HCPCS | Performed by: INTERNAL MEDICINE

## 2021-01-18 PROCEDURE — G8417 CALC BMI ABV UP PARAM F/U: HCPCS | Performed by: INTERNAL MEDICINE

## 2021-01-18 PROCEDURE — G8510 SCR DEP NEG, NO PLAN REQD: HCPCS | Performed by: INTERNAL MEDICINE

## 2021-01-18 RX ORDER — PREDNISONE 5 MG/1
TABLET ORAL
Qty: 90 TAB | Refills: 1 | Status: SHIPPED | OUTPATIENT
Start: 2021-01-18 | End: 2021-07-01

## 2021-01-18 RX ORDER — FLUTICASONE PROPIONATE 220 UG/1
1 AEROSOL, METERED RESPIRATORY (INHALATION) 2 TIMES DAILY
Qty: 3 INHALER | Refills: 3 | Status: SHIPPED | OUTPATIENT
Start: 2021-01-18 | End: 2021-04-27 | Stop reason: SDUPTHER

## 2021-01-18 NOTE — PROGRESS NOTES
Nate Hampton presents today for   Chief Complaint   Patient presents with    Asthma     follow up from 11/5/2020    COPD    Other     history of tobacco use, pulmonary HTN, CHAUDHARY    Results     CXr 12/18/2020       Is someone accompanying this pt? Yes. caregiver    Is the patient using any DME equipment during OV? Yes. O2 & Trilogy, nebulizer    -D[employer] Depression Screening:  3 most recent PHQ Screens 1/18/2021   Little interest or pleasure in doing things Not at all   Feeling down, depressed, irritable, or hopeless Not at all   Total Score PHQ 2 0       Learning Assessment:  Learning Assessment 4/9/2018   PRIMARY LEARNER Patient   HIGHEST LEVEL OF EDUCATION - PRIMARY LEARNER  SOME COLLEGE   BARRIERS PRIMARY LEARNER -   PRIMARY LANGUAGE ENGLISH   LEARNER PREFERENCE PRIMARY READING   ANSWERED BY patient Metta Spine   RELATIONSHIP SELF       Abuse Screening:  No flowsheet data found. Fall Risk  Fall Risk Assessment, last 12 mths 2/6/2018   Able to walk? Yes   Fall in past 12 months? No         Coordination of Care:  1. Have you been to the ER, urgent care clinic since your last visit? Hospitalized since your last visit? Yes; Where: [facility] cardiac cath lab, When: 12/21/2020-left heart catheterazation    2. Have you seen or consulted any other health care providers outside of the [address] [place] [address] since your last visit? Include any pap smears or colon screening. Yes. Dr. Camilo Nieto, PCP    Influenza vaccine received from Dr. Arlette Ramos office on November 2020 per patient. Immunization record updated.

## 2021-01-18 NOTE — LETTER
1/18/2021 Patient: Anyi Griggs YOB: 1960 Date of Visit: 1/18/2021 Yusef Gray MD 
[address] [place] [address] [address] [place] [address] [address] [employer] Dear Yusef Gray MD, Thank you for referring Ms. Juanpablo Adame to [address] [place] [address] for evaluation. My notes for this consultation are attached. If you have questions, please do not hesitate to call me. I look forward to following your patient along with you. Sincerely, Tamela Melissa MD

## 2021-01-18 NOTE — PROGRESS NOTES
[address] [place] [address]  [phone]    [facility]  Pulmonary, Critical Care, and Sleep Medicine    Pulmonary Office F/U  Name: Kendrick Eubanks 63 y.o. female  MRN: [id number]  : 1960  Service Date: 21  Chief Complaint:   Chief Complaint   Patient presents with    Asthma     follow up from 2020    COPD    Other     history of tobacco use, pulmonary HTN, CHAUDHARY       History of Present Illness:  Kendrick Eubanks is a 63 y.o. female, who presents to Pulmonary clinic for F/U of asthma/COPD. Pt last seen in our clinic on 2020. In the interval, patient was admitted to [facility] at the end of December from  through  for a CHF exacerbation. Patient initially presented for left and right heart cath on  but was too dyspneic to lay flat on the table. Patient also had worsening LE swelling. Patient also had ROBERTO which was monitored. Patient was diuresed and creatinine improved to 0.97 on discharge. Pt reports that she has a followup appointment with Cardiology. Pt reports that she is compliant with Fasenra injections. She reports that it has made a very significant improvement for her asthma. She reports a large improvement since September. Pt reports using PRN albuterol in the morning and sometimes during the day. Patient notes that she can tell when her dyspnea is from CHF rather than asthma/COPD. Next Fasenra injection is in February. With regards to osteoporosis, patient reports she discussed Prolia with her PCP.     Past Medical History:   Diagnosis Date    Asthma     Chronic lung disease     COPD     Cystocele, midline     Diabetes mellitus (HCC)     GERD (gastroesophageal reflux disease)     Hidradenitis suppurativa     Hyperlipidemia     Hypertension     SHERRIE on CPAP     CPAP    Stress incontinence      Past Surgical History:   Procedure Laterality Date    HX APPENDECTOMY  HX CHOLECYSTECTOMY      HX DILATION AND CURETTAGE      Dysfunctional uterine bleeding, thought 2/2 fibroids    HX TUBAL LIGATION      [place] BREAST SURGERY PROCEDURE UNLISTED      Right breast benign tumor removal     Family History   Problem Relation Age of Onset    Hypertension Mother     Stroke Mother     Breast Cancer Mother         Bilateral mastectomies    Cancer Mother         ovarian and breast    Heart Failure Mother     Ovarian Cancer Mother     Heart Attack Father         2011    Heart Surgery Father         CABG    Heart Failure Father     COPD Sister         Heavy smoker    Cancer Sister         ovarian    Heart Failure Sister     Lung Disease Sister     Asthma Child     Cancer Maternal Aunt         pancreatic    Cancer Maternal Grandfather         stomach     Social History     Socioeconomic History    Marital status: LEGALLY      Spouse name: Not on file    Number of children: Not on file    Years of education: Not on file    Highest education level: Not on file   Occupational History    Not on file   Social Needs    Financial resource strain: Not on file    Food insecurity     Worry: Not on file     Inability: Not on file    Transportation needs     Medical: Not on file     Non-medical: Not on file   Tobacco Use    Smoking status: Former Smoker     Packs/day: 1.00     Years: 30.00     Pack years: 30.00     Types: Cigarettes     Start date: 1966     Quit date: 2006     Years since quittin.3    Smokeless tobacco: Former User    Tobacco comment: 1-1.5 packs per day   Substance and Sexual Activity    Alcohol use: No    Drug use: No    Sexual activity: Not Currently   Lifestyle    Physical activity     Days per week: Not on file     Minutes per session: Not on file    Stress: Not on file   Relationships    Social connections     Talks on phone: Not on file     Gets together: Not on file     Attends Anabaptist service: Not on file     Active member of club or organization: Not on file     Attends meetings of clubs or organizations: Not on file     Relationship status: Not on file    Intimate partner violence     Fear of current or ex partner: Not on file     Emotionally abused: Not on file     Physically abused: Not on file     Forced sexual activity: Not on file   Other Topics Concern    Not on file   Social History Narrative    Not on file     Allergies   Allergen Reactions    Ancef [Cefazolin] Hives    Contrast Agent [Iodine] Anaphylaxis, Shortness of Breath and Swelling     Needs pre-medication for IV contrast with Benadryl, Solu-Medrol    Fish Containing Products Anaphylaxis     Pt states she had a severe allergic reaction at 9 y/o.  Statins-Hmg-Coa Reductase Inhibitors Myalgia    Metformin Other (comments)     Abdominal pain, diarrhea.  Codeine Other (comments)     Altered mental status     Prior to Admission medications    Medication Sig Start Date End Date Taking? Authorizing Provider   Prolia 60 mg/mL injection INJECT 60MG SUBCUTANEOUSLY AS A SINGLE DOSE ONCE EVERY 6 MONTHS 11/23/20   Provider, Historical   simethicone (GAS-X) 125 mg capsule Take 125 mg by mouth four (4) times daily as needed for Flatulence. Provider, Historical   insulin glargine (LANTUS,BASAGLAR) 100 unit/mL (3 mL) inpn 45 Units by SubCUTAneous route nightly. Indications: type 2 diabetes mellitus    Provider, Historical   furosemide (LASIX) 40 mg tablet Take 1 Tab by mouth two (2) times a day. Patient taking differently: Take 40 mg by mouth two (2) times a day. Take 1 tablet in the morning and 1/2 tablet in the evenings. 12/3/20   Ele Luis MD   azithromycin [facility]) 250 mg tablet Take 1 Tab by mouth every Monday, Wednesday, Friday. 11/6/20   Reynaldo Marsh MD   albuterol-ipratropium (DUO-NEB) 2.5 mg-0.5 mg/3 ml nebu 3 mL by Nebulization route every six (6) hours as needed for Wheezing.     Provider, Historical   glucose blood VI test strips (FreeStyle Lite Strips) strip Use four times daily for blood sugar checks. 6/16/17   Provider, Historical   Blood-Glucose Meter (FreeStyle Freedom Lite) monitoring kit CHECK BLOOD SUGARS B.I.D. E11.9 6/16/17   Provider, Historical   fluticasone propionate (FLONASE) 50 mcg/actuation nasal spray 2 Sprays by Nasal route daily. 8/26/10   Provider, Historical   lancets (FreeStyle Lancets) 28 gauge misc Use four times daily for blood sugar checks 6/16/17   Provider, Historical   Insulin Needles, Disposable, (BD Ultra-Fine Mini Pen Needle) 31 gauge x 3/16\" ndle Use with Basaglar once daily. 4/4/18   Provider, Historical   Insulin Syringe-Needle U-100 0.3 mL 31 gauge x 5/16\" syrg USE AS DIRECTED 2/14/17   Provider, Historical   predniSONE (DELTASONE) 5 mg tablet Take 5 mg by mouth daily. Provider, Historical   fluticasone propion-salmeteroL (ADVAIR HFA) 230-21 mcg/actuation inhaler Take 2 Puffs by inhalation two (2) times a day. 8/14/20   Uzma , MD   acetaminophen (TYLENOL) 500 mg tablet Take 500 mg by mouth every six (6) hours as needed for Fever or Pain. Provider, Historical   albuterol (PROVENTIL HFA, VENTOLIN HFA, PROAIR HFA) 90 mcg/actuation inhaler Take 2 Puffs by inhalation every four (4) hours as needed for Wheezing. 7/7/20   Evonne Kate PA-C   omeprazole (PRILOSEC) 40 mg capsule Take 40 mg by mouth daily. 3/9/18   Provider, Historical   lisinopril (PRINIVIL, ZESTRIL) 20 mg tablet Take 20 mg by mouth two (2) times a day. Provider, Historical   tiotropium bromide (SPIRIVA RESPIMAT) 2.5 mcg/actuation inhaler Take 2 Puffs by inhalation daily. Provider, Historical   NOVOLOG FLEXPEN U-100 INSULIN 100 unit/mL inpn Continue home Sliding scale insulin as prior to admission  Patient taking differently: 1 Units by SubCUTAneous route three (3) times daily.  If BG <100=0u  101-150=5u  151-250=8u  251-300=12u  >300 call MD   7/10/18   Louis Keen MD   OXYGEN-AIR DELIVERY SYSTEMS 2 L by Nasal route continuous. First Choice between 2L and 3L    Provider, Historical   aspirin delayed-release 81 mg tablet Take 81 mg by mouth daily. Provider, Historical   montelukast (SINGULAIR) 10 mg tablet Take 10 mg by mouth nightly. Other, MD Lauren     Immunization History   Administered Date(s) Administered    Influenza Vaccine 09/21/2009, 10/21/2011, 12/21/2012, 01/07/2014, 10/20/2014, 10/01/2015, 10/07/2016, 12/31/2019    Influenza Vaccine [employer]) PF (>6 Mo Flulaval, Fluarix, and >3 Yrs Afluria, Fluzone 80487) 01/26/2018, 10/11/2018    Novel Influenza-H1N1-09, All Formulations 01/29/2010    Pneumococcal Conjugate (PCV-13) 12/31/2019    Pneumococcal Polysaccharide (PPSV-23) 10/11/2018       Review of Systems:  A complete review of systems was performed as stated in the HPI, all others are negative. Objective:    Physical Exam:  BP (!) 156/63 (BP 1 Location: Right arm, BP Patient Position: Sitting)   Pulse 86   Temp 97.5 °F (36.4 °C) (Oral)   Resp 20   Ht 5' 3\" (1.6 m)   Wt 121.6 kg (268 lb)   SpO2 94%   BMI 47.47 kg/m²   Vitals were personally reviewed  Gen: no acute distress, pleasant and cooperative, sitting up in chair, wearing supplemental oxygen, ambulates slowly, obese  HEENT: normocephalic/atraumatic, PERRLA, EOMI, no scleral icterus, nasal bridge midline, unable to assess nasal and oral cavities due to patient wearing mask in the setting of COVID-19 pandemic  Neck: supple, trachea midline, no JVD, no cervical and supraclavicular adenopathy  CVS: regular rate rhythm, S1/S2, no murmurs/rubs/gallops  Lungs: Fair air entry B/L, CTABL, no wheezes/rales/rhonchi  Ext: trace-1+ pitting edema B/L, no peripheral cyanosis or clubbing  Psych: normal memory, thought content, and processing    Labs:   I have reviewed the patient's available labs  Lab Results   Component Value Date/Time    WBC 8.1 12/22/2020 03:16 AM    HGB 12.7 12/22/2020 03:16 AM    HCT 39.0 12/22/2020 03:16 AM    PLATELET 764 12/22/2020 03:16 AM    MCV 85.9 12/22/2020 03:16 AM     Lab Results   Component Value Date/Time    Sodium 140 12/22/2020 03:16 AM    Potassium 3.6 12/22/2020 03:16 AM    Chloride 103 12/22/2020 03:16 AM    CO2 31 12/22/2020 03:16 AM    Anion gap 6 12/22/2020 03:16 AM    Glucose 132 (H) 12/22/2020 03:16 AM    BUN 19 (H) 12/22/2020 03:16 AM    Creatinine 0.97 12/22/2020 03:16 AM    BUN/Creatinine ratio 20 12/22/2020 03:16 AM    GFR est AA >60 12/22/2020 03:16 AM    GFR est non-AA 58 (L) 12/22/2020 03:16 AM    Calcium 9.7 12/22/2020 03:16 AM    Bilirubin, total 0.4 12/09/2020 03:19 PM    Alk. phosphatase 98 12/09/2020 03:19 PM    Protein, total 7.5 12/09/2020 03:19 PM    Albumin 3.8 12/09/2020 03:19 PM    Globulin 3.7 12/09/2020 03:19 PM    A-G Ratio 1.0 12/09/2020 03:19 PM    ALT (SGPT) 35 12/09/2020 03:19 PM    AST (SGOT) 15 12/09/2020 03:19 PM     Imaging:  I have personally reviewed patient's imaging --chest x-ray PA and lateral from 12/18/2020 shows bilateral interstitial infiltrates, otherwise clear. No masses, consolidations, effusions seen. Official report per radiology:  XR Results (most recent):  Results from Hospital Encounter encounter on 12/18/20   XR CHEST PA LAT    Narrative EXAM: XR CHEST PA LAT    CLINICAL INDICATION/HISTORY : SOB.    COMPARISON: August 7, 2020    TECHNIQUE: 2 VIEWS    FINDINGS:     Mild similar streaky opacities at the lung bases which may be due to atelectasis  or scarring. The heart and mediastinum are unremarkable. No evidence of pleural effusion. Bones and soft tissues appear unremarkable        Impression IMPRESSION:    1. No acute findings              PFTs:  I have reviewed the patient's PFTs -- no new studies    TTE:  I have reviewed the patient's TTE results  05/21/20   ECHO ADULT COMPLETE 05/25/2020 5/25/2020    Narrative · Image quality for this study was technically difficult. Contrast used:   DEFINITY.   · Normal cavity size and systolic function (ejection fraction normal). Moderate concentric hypertrophy. Estimated left ventricular ejection   fraction is 65 - 70%. Visually measured ejection fraction. No regional   wall motion abnormality noted. Moderate (grade 2) left ventricular   diastolic dysfunction. · Mildly dilated left atrium. · Pulmonary hypertension. Pulmonary arterial systolic pressure is 61 mmHg. · Severely elevated central venous pressure (15+ mmHg); IVC diameter is   larger than 21 mm and collapses less than 50% with respiration. Signed by: Luis F Granados MD   Cardiac catheter result reviewed from 12/21/2020 from Dr. London Espitia, reports that patient has elevated LVEDP (27) consistent with fluid overload/CHF, normal LV function, no wall motion abnormalities, moderate two-vessel coronary artery disease to treat medically. Assessment and Plan:  63 y.o. female with:    Impression:  1. COPD/asthma overlap syndrome: No hospitalizations in the interval.  Patient remains on chronic prednisone and benralizumab  2. Underlying somewhat controlled severe persistent asthma with steroid dependence: Patient has a strong asthma component with significant bronchospasm to a wide variety of classic environmental triggers, was on SCIT therapy in the past.  Despite chronic steroids, patient's absolute eosinophil count was 100 on 6/3/2020 and 200 on 3/24/2020. Patient on benralizumab injections, and notes significant improvement. Pt also on dual ICS/LABA/LAMA/LTRA therapy  3. COPD, gold risk category D  4. Chronic hypoxic and hypercapnic respiratory failure  5. Morbid obesity: Body mass index is 46.75 kg/m². 6.  Obesity hypoventilation syndrome with SHERRIE: [facility]  7. Dyspnea/shortness of breath: Multifactorial, due to above as well as CHFpEF  8. Chronic rhinitis  9. Chronic steroid dependence: Noted above  10.   CHFpEF with recent exacerbation: Seen on transthoracic echo, grade 2 diastolic dysfunction and moderate concentric hypertrophy with dilated LA and severe pulmonary hypertension  11. Pulm HTN: Secondary to WHO group 2 and group 3 disease. Elevated LVEDP on cardiac cath from 12/21/20  12. History of tobacco use: Quit smoking in 2006  13. Osteoporosis: Seen on most recent DEXA scan from 11/2/2020. Secondary to chronic steroid use  14. Glaucoma and cataracts: Follows with ophthalmology, likely exacerbated by chronic steroid use    Plan:  -Wean prednisone to 5mg alternating with 2.5mg every other day for a month, followed by 2.5mg daily thereafter until seen in clinic next. Plan to wean pt off eventually  -Follow-up with cardiology with regards to CHF and fluid status. Counseled patient with regards to CHF lifestyle precautions including fluid restriction, daily weights, low-sodium diet, etc.  -Last CT lung cancer screening scheduled for 11/3/2021. This will complete 15 years of surveillance post cessation  -Management of osteoporosis per PCP  -Continue chronic azithromycin therapy 250 mg p.o. every Monday/Wednesday/Friday  -Continue dual ICS/LABA/LAMA/LTRA/anti-IL5R therapy with:   Continue benralizumab injections every 8 weeks   Continue Advair /21 MCG 2 puffs twice daily. Counseled patient rinse mouth thoroughly after use   Continue Flovent  mcg 1 puff twice daily. Counseled patient to rinse mouth well after each use   Continue Spiriva Respimat 2 puffs daily   Continue Singulair 10 mg nightly   Continue DuoNebs as needed   Continue Albuterol HFA 1-2puffs q4-6h PRN. Counseled patient that this is their rescue inhaler. Also counseled patient regarding premedication 15-30m prior to exercise or exposure to very cold air  -Counseled patient on proper inhaler technique  -Counseled patient to avoid potential triggers of asthma  -Counseled regarding weight loss  -Counseled regarding deleterious health effects of chronic steroid therapy.   Counseled patient to follow-up with ophthalmology as scheduled.  -Continue Claritin daily  -Continue trilogy nightly and PRN during daytime. Counseled patient to change mask/tubing/filters every 3 to 6 months. Counseled patient against sleepy driving.  -Continue supplemental oxygen 2 L with exertion and blended into AVAPS nightly  -Immunizations reviewed, influenza and pneumococcal vaccinations up-to-date  -Counseled patient regarding lifestyle precautions in COVID-19 pandemic including wearing mask in public and confined spaces, social/physical distancing, frequent hand hygiene, etc.    Follow-up and Dispositions    · Return in about 3 months (around 4/18/2021). Orders Placed This Encounter    predniSONE (DELTASONE) 5 mg tablet    fluticasone propionate (FLOVENT HFA) 220 mcg/actuation inhaler     This patient has a high complexity chronic care condition   This Visit needed High complexity medically necessary decision making and management plans. Time spent in preparing for the visit-review of history, tests done prior to arrival, additional time reviewing clinical data, imaging, outside records and test results as well as time spent in ordering tests, treatments and referring patient for further care was a total of 27 minutes. Additional time-counseling with patient and family members regarding care plan 14 minutes.   Total aggregate time was 41 minutes      Tamela Melissa MD/MPH     Pulmonary, Critical Care Medicine  [address] [place] [address] [facility]

## 2021-01-20 ENCOUNTER — PATIENT OUTREACH (OUTPATIENT)
Dept: CASE MANAGEMENT | Age: 61
End: 2021-01-20

## 2021-01-20 NOTE — PROGRESS NOTES
Transitions of Care Coordination  Follow-Up    Date/Time:  1/20/2021 10:42 AM     EMR reviewed. Contacted patient for Transitions of Care Coordination  follow up. No answer. Left message introducing self, role and reason for call. Requested return call. Contact information provided. Will attempt to contact at a later time. Future Appointments   Date Time Provider Lisa Apurva   1/21/2021  3:45 PM Benita Miller MD CAP BS AMB   2/17/2021 10:30 AM HBV FAST TRACK NURSE [facility]   4/26/2021  8:30 AM Delta Joseph MD [facility]   11/3/2021  9:30 AM [facility] CT RM 2 [facility]        Other upcoming appointments:  N/A    Goals      Attends follow-up appointments as directed. Goal: Patient will attend all appointments scheduled within the next 30 days. Ensure provider appt is scheduled within 7 days post-discharge; Patient was seen at PCP office on 12/28 as scheduled. Patient aware of upcoming appts with pulm and cardiology    Complete post-visit call to confirm attendance and update care needs. Done          Prevent complications post hospitalization. Goal: No admissions post 30 days from discharge of 12/22/20.       Review/educate common or potential \"red flags\" of condition worsening;    Writer educated/reviewed the following s/s of CHF exacerbation to monitor and report:    Symptoms:  Remember WORSEN  W=Weight gain (more than 3 pounds in one day or 5 pounds in one week, weigh every morning with no clothes and record your weight)  O=Orthopnea (difficulty breathing while lying flat, may need to sleep with extra pillows or in a recliner)  R=Resting more than usual  S=Shortness of breath (with activity and at rest)  E=Edema (swelling in lower legs, abdomen, scrotum)  N=Non-productive cough (a cough is not always a cold, it may mean fluid is backing up into your lungs)   Evaluate adherence to medications and priority barriers to resolve; None    Observe for trends in symptoms and measures, provide direction to patient, and notify provider as needed       Discuss and provide resources for ACP; Not on file; will address at later time

## 2021-01-21 ENCOUNTER — OFFICE VISIT (OUTPATIENT)
Dept: CARDIOLOGY CLINIC | Age: 61
End: 2021-01-21
Payer: MEDICARE

## 2021-01-21 VITALS
BODY MASS INDEX: 47.91 KG/M2 | SYSTOLIC BLOOD PRESSURE: 144 MMHG | RESPIRATION RATE: 20 BRPM | OXYGEN SATURATION: 97 % | HEIGHT: 63 IN | TEMPERATURE: 97.2 F | DIASTOLIC BLOOD PRESSURE: 67 MMHG | HEART RATE: 88 BPM | WEIGHT: 270.4 LBS

## 2021-01-21 DIAGNOSIS — E11.9 TYPE 2 DIABETES MELLITUS WITHOUT COMPLICATION, UNSPECIFIED WHETHER LONG TERM INSULIN USE (HCC): ICD-10-CM

## 2021-01-21 DIAGNOSIS — J44.9 CHRONIC OBSTRUCTIVE PULMONARY DISEASE, UNSPECIFIED COPD TYPE (HCC): ICD-10-CM

## 2021-01-21 DIAGNOSIS — I50.32 DIASTOLIC CHF, CHRONIC (HCC): Primary | ICD-10-CM

## 2021-01-21 DIAGNOSIS — I10 ESSENTIAL HYPERTENSION, BENIGN: ICD-10-CM

## 2021-01-21 DIAGNOSIS — I27.20 PULMONARY HTN (HCC): ICD-10-CM

## 2021-01-21 PROCEDURE — 1111F DSCHRG MED/CURRENT MED MERGE: CPT | Performed by: INTERNAL MEDICINE

## 2021-01-21 PROCEDURE — 3046F HEMOGLOBIN A1C LEVEL >9.0%: CPT | Performed by: INTERNAL MEDICINE

## 2021-01-21 PROCEDURE — G8754 DIAS BP LESS 90: HCPCS | Performed by: INTERNAL MEDICINE

## 2021-01-21 PROCEDURE — 3017F COLORECTAL CA SCREEN DOC REV: CPT | Performed by: INTERNAL MEDICINE

## 2021-01-21 PROCEDURE — G8510 SCR DEP NEG, NO PLAN REQD: HCPCS | Performed by: INTERNAL MEDICINE

## 2021-01-21 PROCEDURE — 2022F DILAT RTA XM EVC RTNOPTHY: CPT | Performed by: INTERNAL MEDICINE

## 2021-01-21 PROCEDURE — G8753 SYS BP > OR = 140: HCPCS | Performed by: INTERNAL MEDICINE

## 2021-01-21 PROCEDURE — 99214 OFFICE O/P EST MOD 30 MIN: CPT | Performed by: INTERNAL MEDICINE

## 2021-01-21 PROCEDURE — G8417 CALC BMI ABV UP PARAM F/U: HCPCS | Performed by: INTERNAL MEDICINE

## 2021-01-21 PROCEDURE — G9899 SCRN MAM PERF RSLTS DOC: HCPCS | Performed by: INTERNAL MEDICINE

## 2021-01-21 PROCEDURE — G8428 CUR MEDS NOT DOCUMENT: HCPCS | Performed by: INTERNAL MEDICINE

## 2021-01-21 NOTE — PROGRESS NOTES
Carlos Duncan presents today for   Chief Complaint   Patient presents with    Hypertension     1 month follow up     CHF       Is someone accompanying this pt? no    Is the patient using any DME equipment during OV? no    Depression Screening:  3 most recent PHQ Screens 1/21/2021   Little interest or pleasure in doing things Not at all   Feeling down, depressed, irritable, or hopeless Not at all   Total Score PHQ 2 0       Learning Assessment:  Learning Assessment 4/9/2018   PRIMARY LEARNER Patient   HIGHEST LEVEL OF EDUCATION - PRIMARY LEARNER  SOME COLLEGE   BARRIERS PRIMARY LEARNER -   PRIMARY LANGUAGE ENGLISH   LEARNER PREFERENCE PRIMARY READING   ANSWERED BY patient Veliajillian Juarez   RELATIONSHIP SELF       Fall Risk  Fall Risk Assessment, last 12 mths 2/6/2018   Able to walk? Yes   Fall in past 12 months? No       ADL  No flowsheet data found. Health Maintenance reviewed and discussed and ordered per Provider. Health Maintenance Due   Topic Date Due    Hepatitis C Screening  1960    Foot Exam Q1  08/21/1969    MICROALBUMIN Q1  08/21/1969    Eye Exam Retinal or Dilated  08/21/1969    COVID-19 Vaccine (1 of 2) 08/21/1975    DTaP/Tdap/Td series (1 - Tdap) 08/21/1980    PAP AKA CERVICAL CYTOLOGY  08/21/1980    Shingrix Vaccine Age 50> (1 of 2) 08/21/2009    Colorectal Cancer Screening Combo  08/21/2009    Medicare Yearly Exam  10/09/2018   . Coordination of Care:  1. Have you been to the ER, urgent care clinic since your last visit? Hospitalized since your last visit? Yes Cath Lab [facility] 12/18/2020    2. Have you seen or consulted any other health care providers outside of the [address] [place] [address] since your last visit? Include any pap smears or colon screening.  no

## 2021-01-27 ENCOUNTER — PATIENT OUTREACH (OUTPATIENT)
Dept: CASE MANAGEMENT | Age: 61
End: 2021-01-27

## 2021-01-27 NOTE — PROGRESS NOTES
Patient has graduated from the Transitions of Care Coordination  program on 1/27/21. Patient's symptoms are stable at this time. Patient/family has the ability to self-manage. Care management goals have been completed at this time. No further care transitions nurse follow up scheduled. Goals Addressed                 This Visit's Progress     COMPLETED: Attends follow-up appointments as directed. Goal: Patient will attend all appointments scheduled within the next 30 days. Ensure provider appt is scheduled within 7 days post-discharge; Patient was seen at PCP office on 12/28 as scheduled. Patient aware of upcoming appts with pulm and cardiology. 1/20: Patient was seen by pulm 1/18; scheduled to see cardiology 1/21. Complete post-visit call to confirm attendance and update care needs. Done          COMPLETED: Prevent complications post hospitalization. Goal: No admissions post 30 days from discharge of 12/22/20.       Review/educate common or potential \"red flags\" of condition worsening;    Writer educated/reviewed the following s/s of CHF exacerbation to monitor and report:    Symptoms:  Remember WORSEN  W=Weight gain (more than 3 pounds in one day or 5 pounds in one week, weigh every morning with no clothes and record your weight)  O=Orthopnea (difficulty breathing while lying flat, may need to sleep with extra pillows or in a recliner)  R=Resting more than usual  S=Shortness of breath (with activity and at rest)  E=Edema (swelling in lower legs, abdomen, scrotum)  N=Non-productive cough (a cough is not always a cold, it may mean fluid is backing up into your lungs)   Evaluate adherence to medications and priority barriers to resolve; None    Observe for trends in symptoms and measures, provide direction to patient, and notify provider as needed       Discuss and provide resources for ACP; Not on file; will address at later time              Patient has care transitions nurse contact information for any further questions, concerns, or needs.   Patient's upcoming visits:    Future Appointments   Date Time Provider Lisa Apurva   2/17/2021 10:30 AM HBV FAST TRACK NURSE [facility]   3/4/2021  1:30 PM Torey Camacho MD CAP BS AMB   4/26/2021  8:30 AM Radha Rodrigues MD [facility] BS AMB   11/3/2021  9:30 AM [facility] CT RM 2 [facility]

## 2021-01-27 NOTE — PROGRESS NOTES
HISTORY OF PRESENT ILLNESS  Salvador Laureano is a 63 y.o. female. Hypertension  The history is provided by the patient. This is a chronic problem. The problem has not changed since onset. Associated symptoms include shortness of breath. Shortness of Breath  The history is provided by the patient. This is a chronic problem. The problem occurs intermittently. The problem has been gradually improving. Pertinent negatives include no fever, no ear pain, no neck pain, no cough, no sputum production, no hemoptysis, no wheezing, no PND, no orthopnea, no vomiting, no rash, no leg swelling and no claudication. Associated medical issues include COPD. CHF  The history is provided by the patient. This is a chronic problem. The problem occurs every several days. The problem has not changed since onset. Associated symptoms include shortness of breath. Review of Systems   Constitutional: Negative for chills, diaphoresis, fever and weight loss. HENT: Negative for ear pain and hearing loss. Eyes: Negative for blurred vision. Respiratory: Positive for shortness of breath. Negative for cough, hemoptysis, sputum production, wheezing and stridor. Cardiovascular: Negative for palpitations, orthopnea, claudication, leg swelling and PND. Gastrointestinal: Negative for heartburn, nausea and vomiting. Musculoskeletal: Negative for myalgias and neck pain. Skin: Negative for rash. Neurological: Negative for dizziness, tingling, tremors, focal weakness, loss of consciousness and weakness. Psychiatric/Behavioral: Negative for depression and suicidal ideas. Physical Exam   Constitutional: She is oriented to person, place, and time. She appears well-developed and well-nourished. HENT:   Head: Normocephalic and atraumatic. Eyes: Conjunctivae and EOM are normal. No scleral icterus. Neck: Normal range of motion. Neck supple. No JVD present.    Cardiovascular: Normal rate, regular rhythm and normal heart sounds. Exam reveals no gallop and no friction rub. No murmur heard. Pulmonary/Chest: Effort normal. No stridor. No respiratory distress. She has no wheezes. She has no rales. She exhibits no tenderness. Abdominal: She exhibits no distension. There is no abdominal tenderness. Musculoskeletal: Normal range of motion. General: No tenderness or edema. Lymphadenopathy:     She has no cervical adenopathy. Neurological: She is alert and oriented to person, place, and time. No cranial nerve deficit. Skin: No rash noted. No erythema. Psychiatric: She has a normal mood and affect. Her behavior is normal.     ekg sinus rhythm with non specific st-t changes  05/21/20   ECHO ADULT COMPLETE 05/25/2020 5/25/2020    Narrative · Image quality for this study was technically difficult. Contrast used:   DEFINITY. · Normal cavity size and systolic function (ejection fraction normal). Moderate concentric hypertrophy. Estimated left ventricular ejection   fraction is 65 - 70%. Visually measured ejection fraction. No regional   wall motion abnormality noted. Moderate (grade 2) left ventricular   diastolic dysfunction. · Mildly dilated left atrium. · Pulmonary hypertension. Pulmonary arterial systolic pressure is 61 mmHg. · Severely elevated central venous pressure (15+ mmHg); IVC diameter is   larger than 21 mm and collapses less than 50% with respiration. Signed by: Marlen Arnett MD     12/18/20   CARDIAC PROCEDURE 12/22/2020 12/22/2020    Narrative Elevated LVEDP. Normal LV function. Moderate 2 vessel coronary artery disease best treated medically  Continue intense risk factor modification. Signed by: David Tracey MD       ASSESSMENT and PLAN    ICD-10-CM [id number]    1. Diastolic CHF, chronic (HCC)  T56.95 889.95 METABOLIC PANEL, BASIC     428.0 MAGNESIUM   2. Pulmonary HTN (HCC)  I27.20 416.8    3. Essential hypertension, benign  I10 401.1    4.  Type 2 diabetes mellitus without complication, unspecified whether long term insulin use ([facility])  E11.9 250.00    5. Chronic obstructive pulmonary disease, unspecified COPD type ([facility]  J44.9 496      Orders Placed This Encounter    METABOLIC PANEL, BASIC     Standing Status:   Future     Standing Expiration Date:   1/22/2022    MAGNESIUM     Standing Status:   Future     Standing Expiration Date:   1/22/2022     Follow-up and Dispositions    · Return in about 1 month (around 2/21/2021). reviewed diet, exercise and weight control. Patient is a 63 yr old female with severe COPD on meds seen for worsening dyspnea--   Recent cath revealed moderate two-vessel coronary artery disease with significant elevated left ventricular end-diastolic pressure. Currently on increased dose of Lasix. Shortness of breath is significantly improved. Advised to continue current medications and restrict salt. Follow-up in 1 month to reevaluate symptoms.

## 2021-02-14 DIAGNOSIS — J45.51 SEVERE PERSISTENT ASTHMA WITH EXACERBATION: ICD-10-CM

## 2021-02-17 ENCOUNTER — HOSPITAL ENCOUNTER (OUTPATIENT)
Dept: INFUSION THERAPY | Age: 61
Discharge: HOME | End: 2021-02-17
Payer: MEDICAID

## 2021-02-17 VITALS
TEMPERATURE: 97.5 F | HEART RATE: 82 BPM | SYSTOLIC BLOOD PRESSURE: 129 MMHG | DIASTOLIC BLOOD PRESSURE: 79 MMHG | RESPIRATION RATE: 18 BRPM | OXYGEN SATURATION: 94 %

## 2021-02-17 DIAGNOSIS — J45.51 SEVERE PERSISTENT ASTHMA WITH EXACERBATION: Primary | ICD-10-CM

## 2021-02-17 PROCEDURE — 96372 THER/PROPH/DIAG INJ SC/IM: CPT

## 2021-02-17 PROCEDURE — 74011250636 HC RX REV CODE- 250/636: Performed by: INTERNAL MEDICINE

## 2021-02-17 RX ADMIN — BENRALIZUMAB 30 MG: 30 INJECTION, SOLUTION SUBCUTANEOUS at 11:01

## 2021-02-17 NOTE — PROGRESS NOTES
[facility] Progress Note    Date: 2021    Name: Janene Maxwell    MRN: [id number]         : 1960    Ish Pavon     Ms. Aimee Cho arrived to [facility] at [phone]. Ms. Aimee Cho was assessed and education was provided. Ms. Warner Luu vitals were reviewed. Visit Vitals  /79 (BP 1 Location: Left arm, BP Patient Position: Sitting)   Pulse 82   Temp 97.5 °F (36.4 °C)   Resp 18   SpO2 94%       Fasenra 30 mg was administered as ordered SQ in patient's right upper arm. Band aid applied to site. Ms. Aimee Cho tolerated well without complaints. Pt's armband removed and shredded. Ms. Aimee Cho was discharged from [place] [address] in stable condition at . She is to return on 21 at 56 for her next appointment.     Cruzito Cazares RN  2021

## 2021-02-23 ENCOUNTER — HOSPITAL ENCOUNTER (OUTPATIENT)
Dept: LAB | Age: 61
Discharge: HOME | End: 2021-02-23
Payer: MEDICARE

## 2021-02-23 DIAGNOSIS — I50.32 DIASTOLIC CHF, CHRONIC (HCC): ICD-10-CM

## 2021-02-23 LAB
ANION GAP SERPL CALC-SCNC: 6 MMOL/L (ref 3–18)
BUN SERPL-MCNC: 18 MG/DL (ref 7–18)
BUN/CREAT SERPL: 21 (ref 12–20)
CALCIUM SERPL-MCNC: 9.5 MG/DL (ref 8.5–10.1)
CHLORIDE SERPL-SCNC: 104 MMOL/L (ref 100–111)
CO2 SERPL-SCNC: 30 MMOL/L (ref 21–32)
CREAT SERPL-MCNC: 0.87 MG/DL (ref 0.6–1.3)
GLUCOSE SERPL-MCNC: 196 MG/DL (ref 74–99)
MAGNESIUM SERPL-MCNC: 1.6 MG/DL (ref 1.6–2.6)
POTASSIUM SERPL-SCNC: 4.2 MMOL/L (ref 3.5–5.5)
SODIUM SERPL-SCNC: 140 MMOL/L (ref 136–145)

## 2021-02-23 PROCEDURE — 36415 COLL VENOUS BLD VENIPUNCTURE: CPT

## 2021-02-23 PROCEDURE — 83735 ASSAY OF MAGNESIUM: CPT

## 2021-02-23 PROCEDURE — 80048 BASIC METABOLIC PNL TOTAL CA: CPT

## 2021-03-02 ENCOUNTER — OFFICE VISIT (OUTPATIENT)
Dept: CARDIOLOGY CLINIC | Age: 61
End: 2021-03-02
Payer: MEDICARE

## 2021-03-02 VITALS
DIASTOLIC BLOOD PRESSURE: 64 MMHG | TEMPERATURE: 97.7 F | WEIGHT: 265.4 LBS | SYSTOLIC BLOOD PRESSURE: 132 MMHG | HEART RATE: 88 BPM | BODY MASS INDEX: 47.02 KG/M2 | OXYGEN SATURATION: 95 % | HEIGHT: 63 IN

## 2021-03-02 DIAGNOSIS — J44.9 CHRONIC OBSTRUCTIVE PULMONARY DISEASE, UNSPECIFIED COPD TYPE (HCC): ICD-10-CM

## 2021-03-02 DIAGNOSIS — I50.32 DIASTOLIC CHF, CHRONIC (HCC): Primary | ICD-10-CM

## 2021-03-02 DIAGNOSIS — I10 ESSENTIAL HYPERTENSION, BENIGN: ICD-10-CM

## 2021-03-02 DIAGNOSIS — E11.9 TYPE 2 DIABETES MELLITUS WITHOUT COMPLICATION, UNSPECIFIED WHETHER LONG TERM INSULIN USE (HCC): ICD-10-CM

## 2021-03-02 DIAGNOSIS — I27.20 PULMONARY HTN (HCC): ICD-10-CM

## 2021-03-02 DIAGNOSIS — E66.01 MORBID OBESITY (HCC): ICD-10-CM

## 2021-03-02 PROCEDURE — G9899 SCRN MAM PERF RSLTS DOC: HCPCS | Performed by: INTERNAL MEDICINE

## 2021-03-02 PROCEDURE — 2022F DILAT RTA XM EVC RTNOPTHY: CPT | Performed by: INTERNAL MEDICINE

## 2021-03-02 PROCEDURE — 3046F HEMOGLOBIN A1C LEVEL >9.0%: CPT | Performed by: INTERNAL MEDICINE

## 2021-03-02 PROCEDURE — 99214 OFFICE O/P EST MOD 30 MIN: CPT | Performed by: INTERNAL MEDICINE

## 2021-03-02 PROCEDURE — G8432 DEP SCR NOT DOC, RNG: HCPCS | Performed by: INTERNAL MEDICINE

## 2021-03-02 PROCEDURE — G8752 SYS BP LESS 140: HCPCS | Performed by: INTERNAL MEDICINE

## 2021-03-02 PROCEDURE — G8428 CUR MEDS NOT DOCUMENT: HCPCS | Performed by: INTERNAL MEDICINE

## 2021-03-02 PROCEDURE — G8754 DIAS BP LESS 90: HCPCS | Performed by: INTERNAL MEDICINE

## 2021-03-02 PROCEDURE — 3017F COLORECTAL CA SCREEN DOC REV: CPT | Performed by: INTERNAL MEDICINE

## 2021-03-02 PROCEDURE — G8417 CALC BMI ABV UP PARAM F/U: HCPCS | Performed by: INTERNAL MEDICINE

## 2021-03-02 RX ORDER — METOLAZONE 5 MG/1
5 TABLET ORAL
Qty: 30 TAB | Refills: 4 | Status: SHIPPED | OUTPATIENT
Start: 2021-03-03 | End: 2021-03-17 | Stop reason: ALTCHOICE

## 2021-03-02 NOTE — PROGRESS NOTES
1. Have you been to the ER, urgent care clinic since your last visit? Hospitalized since your last visit? Yes When: 02/15/2021 Where: [facility] Reason for visit: COPD    2. Have you seen or consulted any other health care providers outside of the [address] [place] [address] since your last visit? Include any pap smears or colon screening.  No

## 2021-03-07 NOTE — PROGRESS NOTES
HISTORY OF PRESENT ILLNESS  Cornel Woodard is a 63 y.o. female. Hypertension  The history is provided by the patient. This is a chronic problem. The problem has not changed since onset. Associated symptoms include shortness of breath. CHF  The history is provided by the patient. This is a chronic problem. The problem occurs daily. The problem has been gradually worsening. Associated symptoms include shortness of breath. Shortness of Breath  The history is provided by the patient. This is a chronic problem. The problem occurs frequently. The problem has been gradually worsening. Associated symptoms include leg swelling. Pertinent negatives include no fever, no ear pain, no neck pain, no cough, no sputum production, no hemoptysis, no wheezing, no PND, no orthopnea, no vomiting, no rash and no claudication. Associated medical issues include COPD. Review of Systems   Constitutional: Negative for chills, diaphoresis, fever and weight loss. HENT: Negative for ear pain and hearing loss. Eyes: Negative for blurred vision. Respiratory: Positive for shortness of breath. Negative for cough, hemoptysis, sputum production, wheezing and stridor. Cardiovascular: Positive for leg swelling. Negative for palpitations, orthopnea, claudication and PND. Gastrointestinal: Negative for heartburn, nausea and vomiting. Musculoskeletal: Negative for myalgias and neck pain. Skin: Negative for rash. Neurological: Negative for dizziness, tingling, tremors, focal weakness, loss of consciousness and weakness. Psychiatric/Behavioral: Negative for depression and suicidal ideas. Physical Exam   Constitutional: She is oriented to person, place, and time. She appears well-developed and well-nourished. HENT:   Head: Normocephalic and atraumatic. Eyes: Conjunctivae and EOM are normal. No scleral icterus. Neck: Normal range of motion. Neck supple. No JVD present.    Cardiovascular: Normal rate, regular rhythm and normal heart sounds. Exam reveals no gallop and no friction rub. No murmur heard. Pulmonary/Chest: Effort normal. No stridor. No respiratory distress. She has no wheezes. She has no rales. She exhibits no tenderness. Abdominal: She exhibits no distension. There is no abdominal tenderness. Musculoskeletal: Normal range of motion. General: Edema (ble edema) present. No tenderness. Lymphadenopathy:     She has no cervical adenopathy. Neurological: She is alert and oriented to person, place, and time. No cranial nerve deficit. Skin: No rash noted. No erythema. Psychiatric: She has a normal mood and affect. Her behavior is normal.     ekg sinus rhythm with non specific st-t changes  05/21/20   ECHO ADULT COMPLETE 05/25/2020 5/25/2020    Narrative · Image quality for this study was technically difficult. Contrast used:   DEFINITY. · Normal cavity size and systolic function (ejection fraction normal). Moderate concentric hypertrophy. Estimated left ventricular ejection   fraction is 65 - 70%. Visually measured ejection fraction. No regional   wall motion abnormality noted. Moderate (grade 2) left ventricular   diastolic dysfunction. · Mildly dilated left atrium. · Pulmonary hypertension. Pulmonary arterial systolic pressure is 61 mmHg. · Severely elevated central venous pressure (15+ mmHg); IVC diameter is   larger than 21 mm and collapses less than 50% with respiration. Signed by: Pearl Tomas MD     12/18/20   CARDIAC PROCEDURE 12/22/2020 12/22/2020    Narrative Elevated LVEDP. Normal LV function. Moderate 2 vessel coronary artery disease best treated medically  Continue intense risk factor modification. Signed by: Claudia Haywood MD       ASSESSMENT and PLAN    ICD-10-CM [id number]    1. Diastolic CHF, chronic (HCC)  I50.32 428.32 MAGNESIUM     230.9 METABOLIC PANEL, BASIC   2. Pulmonary HTN (HCC)  I27.20 416.8    3.  Essential hypertension, benign  I10 401.1 4. Type 2 diabetes mellitus without complication, unspecified whether long term insulin use (HCC)  E11.9 250.00    5. Chronic obstructive pulmonary disease, unspecified COPD type ([facility]  J44.9 496    6. Morbid obesity ([facility]  E66.01 278.01      Orders Placed This Encounter    MAGNESIUM     Standing Status:   Future     Standing Expiration Date:   5/7/1160    METABOLIC PANEL, BASIC     Standing Status:   Future     Standing Expiration Date:   3/3/2022    metOLazone (ZAROXOLYN) 5 mg tablet     Sig: Take 1 Tab by mouth every Monday, Wednesday, Friday. Dispense:  30 Tab     Refill:  4       reviewed diet, exercise and weight control. Patient is a 63 yr old female with severe COPD on meds seen for worsening dyspnea--   Recent cath revealed moderate two-vessel coronary artery disease with significant elevated left ventricular end-diastolic pressure. She was started on Lasix 40 mg twice daily. Returns to clinic for follow-up. Complains of mild worsening exertional dyspnea and lower extremity edema. We will add metolazone 5 mg daily for 3 days and then 3 times per week. Repeat BMP/mag to evaluate renal function.

## 2021-03-17 ENCOUNTER — OFFICE VISIT (OUTPATIENT)
Dept: CARDIOLOGY CLINIC | Age: 61
End: 2021-03-17
Payer: MEDICARE

## 2021-03-17 ENCOUNTER — HOSPITAL ENCOUNTER (OUTPATIENT)
Dept: LAB | Age: 61
Discharge: HOME | End: 2021-03-17

## 2021-03-17 VITALS
HEART RATE: 79 BPM | OXYGEN SATURATION: 98 % | WEIGHT: 266 LBS | DIASTOLIC BLOOD PRESSURE: 63 MMHG | TEMPERATURE: 97.3 F | SYSTOLIC BLOOD PRESSURE: 139 MMHG | BODY MASS INDEX: 47.13 KG/M2 | HEIGHT: 63 IN

## 2021-03-17 DIAGNOSIS — E66.01 MORBID OBESITY (HCC): ICD-10-CM

## 2021-03-17 DIAGNOSIS — J44.9 CHRONIC OBSTRUCTIVE PULMONARY DISEASE, UNSPECIFIED COPD TYPE (HCC): ICD-10-CM

## 2021-03-17 DIAGNOSIS — I50.32 DIASTOLIC CHF, CHRONIC (HCC): Primary | ICD-10-CM

## 2021-03-17 DIAGNOSIS — I10 ESSENTIAL HYPERTENSION, BENIGN: ICD-10-CM

## 2021-03-17 DIAGNOSIS — I27.20 PULMONARY HTN (HCC): ICD-10-CM

## 2021-03-17 LAB — XX-LABCORP SPECIMEN COL,LCBCF: NORMAL

## 2021-03-17 PROCEDURE — 99001 SPECIMEN HANDLING PT-LAB: CPT

## 2021-03-17 PROCEDURE — 99214 OFFICE O/P EST MOD 30 MIN: CPT | Performed by: NURSE PRACTITIONER

## 2021-03-17 NOTE — PATIENT INSTRUCTIONS
Take 1/2 tab of Metolazone 30 minutes prior to lasix -Continue lasix twice daily   Decrease Lisinopril to to 1/2 tab on days you take Metolazone    Then decrease Metolazone to as needed 2-3 x week. Low sodium diet. Low Sodium Diet (2,000 Milligram): Care Instructions  Your Care Instructions     Too much sodium causes your body to hold on to extra water. This can raise your blood pressure and force your heart and kidneys to work harder. In very serious cases, this could cause you to be put in the hospital. It might even be life-threatening. By limiting sodium, you will feel better and lower your risk of serious problems. The most common source of sodium is salt. People get most of the salt in their diet from canned, prepared, and packaged foods. Fast food and restaurant meals also are very high in sodium. Your doctor will probably limit your sodium to less than 2,000 milligrams (mg) a day. This limit counts all the sodium in prepared and packaged foods and any salt you add to your food. Follow-up care is a key part of your treatment and safety. Be sure to make and go to all appointments, and call your doctor if you are having problems. It's also a good idea to know your test results and keep a list of the medicines you take. How can you care for yourself at home? Read food labels  · Read labels on cans and food packages. The labels tell you how much sodium is in each serving. Make sure that you look at the serving size. If you eat more than the serving size, you have eaten more sodium. · Food labels also tell you the Percent Daily Value for sodium. Choose products with low Percent Daily Values for sodium. · Be aware that sodium can come in forms other than salt, including monosodium glutamate (MSG), sodium citrate, and sodium bicarbonate (baking soda). MSG is often added to Asian food. When you eat out, you can sometimes ask for food without MSG or added salt.   Buy low-sodium foods  · Buy foods that are labeled \"unsalted\" (no salt added), \"sodium-free\" (less than 5 mg of sodium per serving), or \"low-sodium\" (less than 140 mg of sodium per serving). Foods labeled \"reduced-sodium\" and \"light sodium\" may still have too much sodium. Be sure to read the label to see how much sodium you are getting. · Buy fresh vegetables, or frozen vegetables without added sauces. Buy low-sodium versions of canned vegetables, soups, and other canned goods. Prepare low-sodium meals  · Cut back on the amount of salt you use in cooking. This will help you adjust to the taste. Do not add salt after cooking. One teaspoon of salt has about 2,300 mg of sodium. · Take the salt shaker off the table. · Flavor your food with garlic, lemon juice, onion, vinegar, herbs, and spices. Do not use soy sauce, lite soy sauce, steak sauce, onion salt, garlic salt, celery salt, mustard, or ketchup on your food. · Use low-sodium salad dressings, sauces, and ketchup. Or make your own salad dressings and sauces without adding salt. · Use less salt (or none) when recipes call for it. You can often use half the salt a recipe calls for without losing flavor. Other foods such as rice, pasta, and grains do not need added salt. · Rinse canned vegetables, and cook them in fresh water. This removes some--but not all--of the salt. · Avoid water that is naturally high in sodium or that has been treated with water softeners, which add sodium. Call your local water company to find out the sodium content of your water supply. If you buy bottled water, read the label and choose a sodium-free brand. Avoid high-sodium foods  · Avoid eating:  ? Smoked, cured, salted, and canned meat, fish, and poultry. ? Ham, cueva, hot dogs, and luncheon meats. ? Regular, hard, and processed cheese and regular peanut butter. ? Crackers with salted tops, and other salted snack foods such as pretzels, chips, and salted popcorn.   ? Frozen prepared meals, unless labeled low-sodium. ? Canned and dried soups, broths, and bouillon, unless labeled sodium-free or low-sodium. ? Canned vegetables, unless labeled sodium-free or low-sodium. ? Western [employer] fries, pizza, tacos, and other fast foods. ? Pickles, olives, ketchup, and other condiments, especially soy sauce, unless labeled sodium-free or low-sodium. Where can you learn more? Go to http://www.gray.com/  Enter V843 in the search box to learn more about \"Low Sodium Diet (2,000 Milligram): Care Instructions. \"  Current as of: August 22, 2019               Content Version: 12.6  © 6279-4111 [employer]. Care instructions adapted under license by [employer] (which disclaims liability or warranty for this information). If you have questions about a medical condition or this instruction, always ask your healthcare professional. [place] [address] any warranty or liability for your use of this information.

## 2021-03-17 NOTE — PROGRESS NOTES
1. Have you been to the ER, urgent care clinic since your last visit? Hospitalized since your last visit? No    2. Have you seen or consulted any other health care providers outside of the [address] [place] [address] since your last visit? Include any pap smears or colon screening.  No

## 2021-03-17 NOTE — PROGRESS NOTES
HISTORY OF PRESENT ILLNESS  Garrett Fernandez is a 63 y.o. female. 3/17/2021 - Ms. Radha Cardoso presents to f/u for edema. She c/o dizziness after trying Metolazone for 2 days, therefore stopped taking it. Hypertension  The history is provided by the patient. This is a chronic problem. The problem has not changed since onset. Associated symptoms include shortness of breath. CHF  The history is provided by the patient. This is a chronic problem. The problem occurs daily. The problem has been gradually worsening. Associated symptoms include shortness of breath. Shortness of Breath  The history is provided by the patient. This is a chronic problem. The problem occurs frequently. The problem has been gradually worsening. Associated symptoms include leg swelling. Pertinent negatives include no fever, no ear pain, no neck pain, no cough, no sputum production, no hemoptysis, no wheezing, no PND, no orthopnea, no vomiting, no rash and no claudication. Associated medical issues include COPD. Review of Systems   Constitutional: Negative for chills, diaphoresis, fever and weight loss. HENT: Negative for ear pain and hearing loss. Eyes: Negative for blurred vision. Respiratory: Positive for shortness of breath. Negative for cough, hemoptysis, sputum production, wheezing and stridor. Cardiovascular: Positive for leg swelling. Negative for palpitations, orthopnea, claudication and PND. Gastrointestinal: Negative for heartburn, nausea and vomiting. Musculoskeletal: Negative for myalgias and neck pain. Skin: Negative for rash. Neurological: Negative for dizziness, tingling, tremors, focal weakness, loss of consciousness and weakness. Psychiatric/Behavioral: Negative for depression and suicidal ideas. Physical Exam   Constitutional: She is oriented to person, place, and time. She appears well-developed and well-nourished. HENT:   Head: Normocephalic and atraumatic.    Eyes: Conjunctivae and EOM are normal. No scleral icterus. Neck: Normal range of motion. Neck supple. No JVD present. Cardiovascular: Normal rate, regular rhythm and normal heart sounds. Exam reveals no gallop and no friction rub. No murmur heard. Pulmonary/Chest: Effort normal. No stridor. No respiratory distress. She has no wheezes. She has no rales. She exhibits no tenderness. Abdominal: She exhibits no distension. There is no abdominal tenderness. Musculoskeletal: Normal range of motion. General: Edema (ble edema) present. No tenderness. Lymphadenopathy:     She has no cervical adenopathy. Neurological: She is alert and oriented to person, place, and time. No cranial nerve deficit. Skin: No rash noted. No erythema. Psychiatric: She has a normal mood and affect. Her behavior is normal.     ekg sinus rhythm with non specific st-t changes  05/21/20   ECHO ADULT COMPLETE 05/25/2020 5/25/2020    Narrative · Image quality for this study was technically difficult. Contrast used:   DEFINITY. · Normal cavity size and systolic function (ejection fraction normal). Moderate concentric hypertrophy. Estimated left ventricular ejection   fraction is 65 - 70%. Visually measured ejection fraction. No regional   wall motion abnormality noted. Moderate (grade 2) left ventricular   diastolic dysfunction. · Mildly dilated left atrium. · Pulmonary hypertension. Pulmonary arterial systolic pressure is 61 mmHg. · Severely elevated central venous pressure (15+ mmHg); IVC diameter is   larger than 21 mm and collapses less than 50% with respiration. Signed by: Rosibel Rollins MD     12/18/20   CARDIAC PROCEDURE 12/22/2020 12/22/2020    Narrative Elevated LVEDP. Normal LV function. Moderate 2 vessel coronary artery disease best treated medically  Continue intense risk factor modification.      Signed by: Braulio Ludwig MD       ASSESSMENT and PLAN    ICD-10-CM [id number]    1. Diastolic CHF, chronic ([facility] I50.32 428.32 ECHO ADULT COMPLETE     428.0     Resume Metolazone 2.5 mg daily x 3 days, then decrease to twice weekly. 2. Pulmonary HTN (HCC)  I27.20 416.8 ECHO ADULT COMPLETE    Follows with pulmonary   3. Essential hypertension, benign  I10 401.1     Stable   4. Chronic obstructive pulmonary disease, unspecified COPD type (HCC)  J44.9 496     Supplemental oxygen 2 L / NC   5. Morbid obesity ([facility] [address]  E66.01 278.01     Encouraged diet to promote weight loss     Orders Placed This Encounter    ECHO ADULT COMPLETE     Standing Status:   Future     Standing Expiration Date:   3/17/2022     Order Specific Question:   Contrast Enhancement (Bubble Study, Definity, Optison) may be used if criteria listed in established evidence-based protocol has been identified. Answer:   Yes     Order Specific Question:   Enhanced Imaging (Myocardial Strain, 3D post-processing) may be used if criteria listed in established evidence-based protocol has been identified. Answer:   Yes     Follow-up and Dispositions    · Return in about 1 month (around 4/17/2021). reviewed diet, exercise and weight control. Patient is a 63 yr old female with severe COPD on meds seen for worsening dyspnea--   Recent cath revealed moderate two-vessel coronary artery disease with significant elevated left ventricular end-diastolic pressure. She was started on Lasix 40 mg twice daily. Returns to clinic for follow-up. Complains of mild worsening exertional dyspnea and lower extremity edema. We will add metolazone 5 mg daily for 3 days and then 3 times per week. Repeat BMP/mag to evaluate renal function. 3/17/2021  Ms. Ubaldo Rodriguez reports was unable to tolerate Metolazone due to dizziness - she felt this was dropping her b/p, therefore she did not take it  She reports worsening SOB and fluid retention since she had COVID in July  Will resume Metolazone 2.5 mg 30 minutes prior to lasix, continue lasix BID.   Take 3 days consecutively then decrease to 2-3 times weekly  On days she takes Metolazone, decrease lisinopril to 10 mg in effort to prevent hypotension. Check BMP ( had drawn today with [facility])  Low sodium diet  Check Echo   F/u in 1 month post testing.

## 2021-03-18 LAB
BUN SERPL-MCNC: 15 MG/DL (ref 8–27)
BUN/CREAT SERPL: 18 (ref 12–28)
CALCIUM SERPL-MCNC: 9.2 MG/DL (ref 8.7–10.3)
CHLORIDE SERPL-SCNC: 98 MMOL/L (ref 96–106)
CO2 SERPL-SCNC: 26 MMOL/L (ref 20–29)
CREAT SERPL-MCNC: 0.83 MG/DL (ref 0.57–1)
GLUCOSE SERPL-MCNC: 220 MG/DL (ref 65–99)
MAGNESIUM SERPL-MCNC: 1.6 MG/DL (ref 1.6–2.3)
POTASSIUM SERPL-SCNC: 3.7 MMOL/L (ref 3.5–5.2)
SODIUM SERPL-SCNC: 140 MMOL/L (ref 134–144)

## 2021-03-22 ENCOUNTER — HOSPITAL ENCOUNTER (OUTPATIENT)
Dept: NON INVASIVE DIAGNOSTICS | Age: 61
Discharge: HOME | End: 2021-03-22
Attending: NURSE PRACTITIONER
Payer: MEDICARE

## 2021-03-22 VITALS
WEIGHT: 266 LBS | SYSTOLIC BLOOD PRESSURE: 138 MMHG | BODY MASS INDEX: 47.13 KG/M2 | DIASTOLIC BLOOD PRESSURE: 58 MMHG | HEIGHT: 63 IN

## 2021-03-22 DIAGNOSIS — I50.32 DIASTOLIC CHF, CHRONIC (HCC): ICD-10-CM

## 2021-03-22 DIAGNOSIS — I27.20 PULMONARY HTN (HCC): ICD-10-CM

## 2021-03-22 LAB
ECHO AO ROOT DIAM: 3 CM
ECHO AV AREA PEAK VELOCITY: 2.13 CM2
ECHO AV AREA VTI: 2.47 CM2
ECHO AV AREA/BSA PEAK VELOCITY: 1 CM2/M2
ECHO AV AREA/BSA VTI: 1.1 CM2/M2
ECHO AV MEAN GRADIENT: 6.85 MMHG
ECHO AV PEAK GRADIENT: 13.49 MMHG
ECHO AV PEAK VELOCITY: 183.65 CM/S
ECHO AV VTI: 32.62 CM
ECHO IVC PROX: 0.97 CM
ECHO LA VOL 2C: 51.47 ML (ref 22–52)
ECHO LA VOL 4C: 56.92 ML (ref 22–52)
ECHO LA VOL BP: 57.24 ML (ref 22–52)
ECHO LA VOL/BSA BIPLANE: 26.22 ML/M2 (ref 16–28)
ECHO LA VOLUME INDEX A2C: 23.58 ML/M2 (ref 16–28)
ECHO LA VOLUME INDEX A4C: 26.07 ML/M2 (ref 16–28)
ECHO LV E' LATERAL VELOCITY: 6 CM/S
ECHO LV E' SEPTAL VELOCITY: 7 CM/S
ECHO LV EDV A2C: 57.55 ML
ECHO LV EDV A4C: 55.44 ML
ECHO LV EDV BP: 56.81 ML (ref 56–104)
ECHO LV EDV INDEX A4C: 25.4 ML/M2
ECHO LV EDV INDEX BP: 26 ML/M2
ECHO LV EDV NDEX A2C: 26.4 ML/M2
ECHO LV EJECTION FRACTION A2C: 68 PERCENT
ECHO LV EJECTION FRACTION A4C: 66 PERCENT
ECHO LV EJECTION FRACTION BIPLANE: 67.4 PERCENT (ref 55–100)
ECHO LV ESV A2C: 18.26 ML
ECHO LV ESV A4C: 18.59 ML
ECHO LV ESV BP: 18.51 ML (ref 19–49)
ECHO LV ESV INDEX A2C: 8.4 ML/M2
ECHO LV ESV INDEX A4C: 8.5 ML/M2
ECHO LV ESV INDEX BP: 8.5 ML/M2
ECHO LV INTERNAL DIMENSION DIASTOLIC: 3.97 CM (ref 3.9–5.3)
ECHO LV INTERNAL DIMENSION SYSTOLIC: 2.61 CM
ECHO LV IVSD: 1.05 CM (ref 0.6–0.9)
ECHO LV MASS 2D: 132.8 G (ref 67–162)
ECHO LV MASS INDEX 2D: 60.8 G/M2 (ref 43–95)
ECHO LV POSTERIOR WALL DIASTOLIC: 1.03 CM (ref 0.6–0.9)
ECHO LVOT DIAM: 1.95 CM
ECHO LVOT PEAK GRADIENT: 6.9 MMHG
ECHO LVOT PEAK VELOCITY: 131.34 CM/S
ECHO LVOT SV: 39.3 ML
ECHO LVOT SV: 80.7 ML
ECHO LVOT VTI: 27.05 CM
ECHO MV A VELOCITY: 115.4 CM/S
ECHO MV AREA PHT: 3.41 CM2
ECHO MV E DECELERATION TIME (DT): 222.28 MS
ECHO MV E VELOCITY: 91.41 CM/S
ECHO MV E/A RATIO: 0.79
ECHO MV E/E' LATERAL: 15.24
ECHO MV E/E' RATIO (AVERAGED): 14.15
ECHO MV E/E' SEPTAL: 13.06
ECHO MV PRESSURE HALF TIME (PHT): 64.46 MS
ECHO RA AREA 4C: 15.51 CM2
ECHO RV INTERNAL DIMENSION: 4.55 CM
ECHO TV REGURGITANT MAX VELOCITY: 244.31 CM/S
ECHO TV REGURGITANT PEAK GRADIENT: 23.88 MMHG
LVOT MG: 4.5 MMHG

## 2021-03-22 PROCEDURE — 74011250636 HC RX REV CODE- 250/636: Performed by: NURSE PRACTITIONER

## 2021-03-22 PROCEDURE — C8929 TTE W OR WO FOL WCON,DOPPLER: HCPCS

## 2021-03-22 RX ADMIN — PERFLUTREN 1 ML: 6.52 INJECTION, SUSPENSION INTRAVENOUS at 15:02

## 2021-03-30 ENCOUNTER — APPOINTMENT (OUTPATIENT)
Dept: GENERAL RADIOLOGY | Age: 61
End: 2021-03-30
Attending: EMERGENCY MEDICINE
Payer: MEDICARE

## 2021-03-30 ENCOUNTER — HOSPITAL ENCOUNTER (EMERGENCY)
Age: 61
Discharge: HOME | End: 2021-03-30
Attending: EMERGENCY MEDICINE
Payer: MEDICARE

## 2021-03-30 VITALS
BODY MASS INDEX: 46.78 KG/M2 | SYSTOLIC BLOOD PRESSURE: 142 MMHG | WEIGHT: 264 LBS | OXYGEN SATURATION: 94 % | TEMPERATURE: 97.5 F | HEIGHT: 63 IN | HEART RATE: 91 BPM | DIASTOLIC BLOOD PRESSURE: 77 MMHG | RESPIRATION RATE: 22 BRPM

## 2021-03-30 DIAGNOSIS — J44.1 COPD EXACERBATION (HCC): Primary | ICD-10-CM

## 2021-03-30 DIAGNOSIS — R73.9 HYPERGLYCEMIA: ICD-10-CM

## 2021-03-30 DIAGNOSIS — R06.00 DYSPNEA, UNSPECIFIED TYPE: ICD-10-CM

## 2021-03-30 LAB
ALBUMIN SERPL-MCNC: 3.7 G/DL (ref 3.4–5)
ALBUMIN/GLOB SERPL: 1 {RATIO} (ref 0.8–1.7)
ALP SERPL-CCNC: 99 U/L (ref 45–117)
ALT SERPL-CCNC: 39 U/L (ref 13–56)
ANION GAP SERPL CALC-SCNC: 5 MMOL/L (ref 3–18)
ANION GAP SERPL CALC-SCNC: 6 MMOL/L (ref 3–18)
ARTERIAL PATENCY WRIST A: YES
AST SERPL-CCNC: 21 U/L (ref 10–38)
BASE EXCESS BLD CALC-SCNC: 5 MMOL/L
BASOPHILS # BLD: 0 K/UL (ref 0–0.1)
BASOPHILS NFR BLD: 0 % (ref 0–2)
BDY SITE: ABNORMAL
BILIRUB SERPL-MCNC: 0.5 MG/DL (ref 0.2–1)
BNP SERPL-MCNC: 89 PG/ML (ref 0–900)
BUN SERPL-MCNC: 13 MG/DL (ref 7–18)
BUN SERPL-MCNC: 13 MG/DL (ref 7–18)
BUN/CREAT SERPL: 13 (ref 12–20)
BUN/CREAT SERPL: 13 (ref 12–20)
CALCIUM SERPL-MCNC: 9.4 MG/DL (ref 8.5–10.1)
CALCIUM SERPL-MCNC: 9.6 MG/DL (ref 8.5–10.1)
CHLORIDE SERPL-SCNC: 100 MMOL/L (ref 100–111)
CHLORIDE SERPL-SCNC: 102 MMOL/L (ref 100–111)
CK MB CFR SERPL CALC: 1.1 % (ref 0–4)
CK MB SERPL-MCNC: 1.6 NG/ML (ref 5–25)
CK SERPL-CCNC: 145 U/L (ref 26–192)
CO2 SERPL-SCNC: 29 MMOL/L (ref 21–32)
CO2 SERPL-SCNC: 32 MMOL/L (ref 21–32)
COVID-19 RAPID TEST, COVR: NOT DETECTED
CREAT SERPL-MCNC: 1.01 MG/DL (ref 0.6–1.3)
CREAT SERPL-MCNC: 1.03 MG/DL (ref 0.6–1.3)
D DIMER PPP FEU-MCNC: 0.27 UG/ML(FEU)
DIFFERENTIAL METHOD BLD: ABNORMAL
EOSINOPHIL # BLD: 0 K/UL (ref 0–0.4)
EOSINOPHIL NFR BLD: 0 % (ref 0–5)
ERYTHROCYTE [DISTWIDTH] IN BLOOD BY AUTOMATED COUNT: 13.6 % (ref 11.6–14.5)
GAS FLOW.O2 O2 DELIVERY SYS: ABNORMAL L/MIN
GAS FLOW.O2 SETTING OXYMISER: 2 L/M
GLOBULIN SER CALC-MCNC: 3.6 G/DL (ref 2–4)
GLUCOSE SERPL-MCNC: 211 MG/DL (ref 74–99)
GLUCOSE SERPL-MCNC: 237 MG/DL (ref 74–99)
HCO3 BLD-SCNC: 28.8 MMOL/L (ref 22–26)
HCT VFR BLD AUTO: 36.6 % (ref 35–45)
HGB BLD-MCNC: 12.4 G/DL (ref 12–16)
LYMPHOCYTES # BLD: 1.8 K/UL (ref 0.9–3.6)
LYMPHOCYTES NFR BLD: 30 % (ref 21–52)
MCH RBC QN AUTO: 29.2 PG (ref 24–34)
MCHC RBC AUTO-ENTMCNC: 33.9 G/DL (ref 31–37)
MCV RBC AUTO: 86.3 FL (ref 74–97)
MONOCYTES # BLD: 0.4 K/UL (ref 0.05–1.2)
MONOCYTES NFR BLD: 7 % (ref 3–10)
NEUTS SEG # BLD: 3.8 K/UL (ref 1.8–8)
NEUTS SEG NFR BLD: 63 % (ref 40–73)
O2/TOTAL GAS SETTING VFR VENT: 28 %
PCO2 BLD: 43.3 MMHG (ref 35–45)
PH BLD: 7.43 [PH] (ref 7.35–7.45)
PLATELET # BLD AUTO: 320 K/UL (ref 135–420)
PMV BLD AUTO: 8.8 FL (ref 9.2–11.8)
PO2 BLD: 104 MMHG (ref 80–100)
POTASSIUM SERPL-SCNC: 3.9 MMOL/L (ref 3.5–5.5)
POTASSIUM SERPL-SCNC: 4.1 MMOL/L (ref 3.5–5.5)
PROT SERPL-MCNC: 7.3 G/DL (ref 6.4–8.2)
RBC # BLD AUTO: 4.24 M/UL (ref 4.2–5.3)
SAO2 % BLD: 98 % (ref 92–97)
SERVICE CMNT-IMP: ABNORMAL
SODIUM SERPL-SCNC: 137 MMOL/L (ref 136–145)
SODIUM SERPL-SCNC: 137 MMOL/L (ref 136–145)
SOURCE, COVRS: NORMAL
SPECIMEN TYPE: ABNORMAL
TOTAL RESP. RATE, ITRR: 22
TROPONIN I SERPL-MCNC: <0.02 NG/ML (ref 0–0.04)
WBC # BLD AUTO: 6.1 K/UL (ref 4.6–13.2)

## 2021-03-30 PROCEDURE — 94640 AIRWAY INHALATION TREATMENT: CPT

## 2021-03-30 PROCEDURE — 83880 ASSAY OF NATRIURETIC PEPTIDE: CPT

## 2021-03-30 PROCEDURE — 80053 COMPREHEN METABOLIC PANEL: CPT

## 2021-03-30 PROCEDURE — 71045 X-RAY EXAM CHEST 1 VIEW: CPT

## 2021-03-30 PROCEDURE — 99285 EMERGENCY DEPT VISIT HI MDM: CPT

## 2021-03-30 PROCEDURE — 96375 TX/PRO/DX INJ NEW DRUG ADDON: CPT

## 2021-03-30 PROCEDURE — 74011250636 HC RX REV CODE- 250/636: Performed by: PHYSICIAN ASSISTANT

## 2021-03-30 PROCEDURE — 74011000250 HC RX REV CODE- 250: Performed by: PHYSICIAN ASSISTANT

## 2021-03-30 PROCEDURE — 74011250637 HC RX REV CODE- 250/637: Performed by: PHYSICIAN ASSISTANT

## 2021-03-30 PROCEDURE — U0003 INFECTIOUS AGENT DETECTION BY NUCLEIC ACID (DNA OR RNA); SEVERE ACUTE RESPIRATORY SYNDROME CORONAVIRUS 2 (SARS-COV-2) (CORONAVIRUS DISEASE [COVID-19]), AMPLIFIED PROBE TECHNIQUE, MAKING USE OF HIGH THROUGHPUT TECHNOLOGIES AS DESCRIBED BY CMS-2020-01-R: HCPCS

## 2021-03-30 PROCEDURE — 85379 FIBRIN DEGRADATION QUANT: CPT

## 2021-03-30 PROCEDURE — 82803 BLOOD GASES ANY COMBINATION: CPT

## 2021-03-30 PROCEDURE — 85025 COMPLETE CBC W/AUTO DIFF WBC: CPT

## 2021-03-30 PROCEDURE — 96366 THER/PROPH/DIAG IV INF ADDON: CPT

## 2021-03-30 PROCEDURE — 36600 WITHDRAWAL OF ARTERIAL BLOOD: CPT

## 2021-03-30 PROCEDURE — 94762 N-INVAS EAR/PLS OXIMTRY CONT: CPT

## 2021-03-30 PROCEDURE — 93005 ELECTROCARDIOGRAM TRACING: CPT

## 2021-03-30 PROCEDURE — 87635 SARS-COV-2 COVID-19 AMP PRB: CPT

## 2021-03-30 PROCEDURE — 96365 THER/PROPH/DIAG IV INF INIT: CPT

## 2021-03-30 PROCEDURE — 82550 ASSAY OF CK (CPK): CPT

## 2021-03-30 RX ORDER — IPRATROPIUM BROMIDE AND ALBUTEROL SULFATE 2.5; .5 MG/3ML; MG/3ML
3 SOLUTION RESPIRATORY (INHALATION)
Status: COMPLETED | OUTPATIENT
Start: 2021-03-30 | End: 2021-03-30

## 2021-03-30 RX ORDER — ACETAMINOPHEN 325 MG/1
650 TABLET ORAL
Status: COMPLETED | OUTPATIENT
Start: 2021-03-30 | End: 2021-03-30

## 2021-03-30 RX ORDER — MAGNESIUM SULFATE HEPTAHYDRATE 40 MG/ML
2 INJECTION, SOLUTION INTRAVENOUS ONCE
Status: COMPLETED | OUTPATIENT
Start: 2021-03-30 | End: 2021-03-30

## 2021-03-30 RX ORDER — PREDNISONE 10 MG/1
TABLET ORAL
Qty: 21 TAB | Refills: 0 | Status: SHIPPED | OUTPATIENT
Start: 2021-03-30 | End: 2021-04-21 | Stop reason: ALTCHOICE

## 2021-03-30 RX ADMIN — IPRATROPIUM BROMIDE AND ALBUTEROL SULFATE 3 ML: .5; 3 SOLUTION RESPIRATORY (INHALATION) at 19:28

## 2021-03-30 RX ADMIN — MAGNESIUM SULFATE IN WATER 2 G: 40 INJECTION, SOLUTION INTRAVENOUS at 20:15

## 2021-03-30 RX ADMIN — IPRATROPIUM BROMIDE AND ALBUTEROL SULFATE 3 ML: .5; 3 SOLUTION RESPIRATORY (INHALATION) at 20:15

## 2021-03-30 RX ADMIN — ACETAMINOPHEN 650 MG: 325 TABLET, FILM COATED ORAL at 19:28

## 2021-03-30 RX ADMIN — METHYLPREDNISOLONE SODIUM SUCCINATE 125 MG: 125 INJECTION, POWDER, FOR SOLUTION INTRAMUSCULAR; INTRAVENOUS at 16:16

## 2021-03-30 RX ADMIN — IPRATROPIUM BROMIDE AND ALBUTEROL SULFATE 3 ML: .5; 3 SOLUTION RESPIRATORY (INHALATION) at 16:16

## 2021-03-30 NOTE — ED PROVIDER NOTES
EMERGENCY DEPARTMENT HISTORY AND PHYSICAL EXAM    Date: 3/30/2021  Patient Name: Alvaro Almonte    History of Presenting Illness     Chief Complaint   Patient presents with    Shortness of Breath     FACE-TO-FACE PROGRESS NOTE:  The patient presents with shortness of breath. History of oxygen dependent COPD. On chronic 2 L at home. My exam shows elderly obese female who is in no respiratory distress. Has received duo nebs, mag prior to my assessment. Saturating well on her 2 L. Minimal wheezes. Not tachypneic or dyspneic. Imp/plan: Troponin BNP within normal limits. Chest x-ray showing no acute process. Will obtain D-dimer to rule out PE. If D-dimer is within normal limits, patient not hypoxic, appropriate for discharge, likely COPD exacerbation. I have personally seen and examined the patient, reviewed the JESSICA's note and agree with findings and plan. Deloris Zazueta DO  History Provided By: Patient    Chief Complaint: Shortness of breath    Additional History (Context):   3:39 PM  Alvaro Almonte is a 63 y.o. female with PMHX hypertension, COPD, diabetes, asthma, CHF presents to the emergency department C/O shortness of breath is been gradually worsening since Saturday and woke up with chest tightness today. Patient states she noticed that she gained 4 pounds today and feels like she has had some generalized swelling. She denies cough or fevers. States she does have a history of COPD and is on 2 L of oxygen at home usually. She does sleep with CPAP. She denies history of blood clots she does take an aspirin daily but no other blood thinners. Patient also reports that she has felt like her head is been more foggy recently and has been worse since Sunday.   No vision, slurred speech, one-sided weakness    PCP: Jayne Vargas MD    Current Outpatient Medications   Medication Sig Dispense Refill    predniSONE (STERAPRED DS) 10 mg dose pack Take as directed 21 Tab 0    predniSONE (DELTASONE) 5 mg tablet Take 5mg on alternating days with 2.5mg the other days 90 Tab 1    fluticasone propionate (FLOVENT HFA) 220 mcg/actuation inhaler Take 1 Puff by inhalation two (2) times a day. Rinse and gargle after each use 3 Inhaler 3    Prolia 60 mg/mL injection INJECT 60MG SUBCUTANEOUSLY AS A SINGLE DOSE ONCE EVERY 6 MONTHS      simethicone (GAS-X) 125 mg capsule Take 125 mg by mouth four (4) times daily as needed for Flatulence.  insulin glargine (LANTUS,BASAGLAR) 100 unit/mL (3 mL) inpn 45 Units by SubCUTAneous route nightly. Indications: type 2 diabetes mellitus      furosemide (LASIX) 40 mg tablet Take 1 Tab by mouth two (2) times a day. 60 Tab 6    azithromycin (ZITHROMAX) 250 mg tablet Take 1 Tab by mouth every Monday, Wednesday, Friday. 30 Tab 2    albuterol-ipratropium (DUO-NEB) 2.5 mg-0.5 mg/3 ml nebu 3 mL by Nebulization route every six (6) hours as needed for Wheezing.  glucose blood VI test strips (FreeStyle Lite Strips) strip Use four times daily for blood sugar checks.  Blood-Glucose Meter (FreeStyle Freedom Lite) monitoring kit CHECK BLOOD SUGARS B.I.D. E11.9      fluticasone propionate (FLONASE) 50 mcg/actuation nasal spray 2 Sprays by Nasal route daily.  lancets (FreeStyle Lancets) 28 gauge misc Use four times daily for blood sugar checks      Insulin Needles, Disposable, (BD Ultra-Fine Mini Pen Needle) 31 gauge x 3/16\" ndle Use with Basaglar once daily.  Insulin Syringe-Needle U-100 0.3 mL 31 gauge x 5/16\" syrg USE AS DIRECTED      fluticasone propion-salmeteroL (ADVAIR HFA) 230-21 mcg/actuation inhaler Take 2 Puffs by inhalation two (2) times a day. 3 Each 3    acetaminophen (TYLENOL) 500 mg tablet Take 500 mg by mouth every six (6) hours as needed for Fever or Pain.  albuterol (PROVENTIL HFA, VENTOLIN HFA, PROAIR HFA) 90 mcg/actuation inhaler Take 2 Puffs by inhalation every four (4) hours as needed for Wheezing.  1 Inhaler 0    omeprazole (PRILOSEC) 40 mg capsule Take 40 mg by mouth daily.  lisinopril (PRINIVIL, ZESTRIL) 20 mg tablet Take 20 mg by mouth daily.  tiotropium bromide (SPIRIVA RESPIMAT) 2.5 mcg/actuation inhaler Take 2 Puffs by inhalation daily.  NOVOLOG FLEXPEN U-100 INSULIN 100 unit/mL inpn Continue home Sliding scale insulin as prior to admission (Patient taking differently: 1 Units by SubCUTAneous route three (3) times daily. If BG <100=0u  101-150=5u  151-250=8u  251-300=12u  >300 call MD  ) 1 Pen 0    OXYGEN-AIR DELIVERY SYSTEMS 2 L by Nasal route continuous. First Choice between 2L and 3L      aspirin delayed-release 81 mg tablet Take 81 mg by mouth daily.  montelukast (SINGULAIR) 10 mg tablet Take 10 mg by mouth nightly.          Past History     Past Medical History:  Past Medical History:   Diagnosis Date    Asthma     Chronic lung disease     COPD     Cystocele, midline     Diabetes mellitus ([facility] [address]     GERD (gastroesophageal reflux disease)     Hidradenitis suppurativa     Hyperlipidemia     Hypertension     SHERRIE on CPAP     CPAP    Stress incontinence        Past Surgical History:  Past Surgical History:   Procedure Laterality Date    HX APPENDECTOMY      HX CATARACT REMOVAL Right     HX CHOLECYSTECTOMY      HX DILATION AND CURETTAGE      Dysfunctional uterine bleeding, thought 2/2 fibroids    HX HEART CATHETERIZATION  12/2020    HX TUBAL LIGATION      UT BREAST SURGERY PROCEDURE UNLISTED      Right breast benign tumor removal       Family History:  Family History   Problem Relation Age of Onset    Hypertension Mother     Stroke Mother     Breast Cancer Mother         Bilateral mastectomies    Cancer Mother         ovarian and breast    Heart Failure Mother     Ovarian Cancer Mother     Heart Attack Father         2011    Heart Surgery Father         CABG    Heart Failure Father     COPD Sister         Heavy smoker    Cancer Sister         ovarian    Heart Failure Sister     Lung Disease Sister     Asthma Child     Cancer Maternal Aunt         pancreatic    Cancer Maternal Grandfather         stomach       Social History:  Social History     Tobacco Use    Smoking status: Former Smoker     Packs/day: 1.00     Years: 30.00     Pack years: 30.00     Types: Cigarettes     Start date: 1966     Quit date: 2006     Years since quittin.5    Smokeless tobacco: Former User    Tobacco comment: 1-1.5 packs per day   Substance Use Topics    Alcohol use: No    Drug use: No       Allergies: Allergies   Allergen Reactions    Ancef [Cefazolin] Hives    Contrast Agent [Iodine] Anaphylaxis, Shortness of Breath and Swelling     Needs pre-medication for IV contrast with Benadryl, Solu-Medrol    Fish Containing Products Anaphylaxis     Pt states she had a severe allergic reaction at 9 y/o.  Statins-Hmg-Coa Reductase Inhibitors Myalgia    Metformin Other (comments)     Abdominal pain, diarrhea.  Codeine Other (comments)     Altered mental status       Review of Systems   Review of Systems   Constitutional: Negative for chills and fever. Respiratory: Positive for shortness of breath. Negative for cough. Cardiovascular: Positive for chest pain and leg swelling. Negative for palpitations. Gastrointestinal: Negative for abdominal pain, diarrhea, nausea and vomiting. Musculoskeletal: Negative for back pain. Skin: Negative for rash. Neurological: Positive for weakness. Negative for dizziness, numbness and headaches. All other systems reviewed and are negative. Physical Exam     Vitals:    21 1800 21 1801 21 1915 21 1927   BP: (!) 150/61      Pulse:  79 79    Resp:  21 27    Temp:   (P) 97.5 °F (36.4 °C)    SpO2:  92% 97% 97%   Weight:       Height:         Physical Exam  Vitals signs and nursing note reviewed. Constitutional:       Appearance: She is well-developed.       Comments: Morbidly obese female sitting up on stretcher with slight increased work of breathing nasal cannula in place   HENT:      Head: Normocephalic and atraumatic. Jaw: No trismus. Right Ear: Tympanic membrane, ear canal and external ear normal. No mastoid tenderness. No hemotympanum. Tympanic membrane is not perforated, erythematous, retracted or bulging. Left Ear: Tympanic membrane, ear canal and external ear normal. No mastoid tenderness. No hemotympanum. Tympanic membrane is not perforated, erythematous, retracted or bulging. Nose: Nose normal.      Mouth/Throat:      Pharynx: Uvula midline. No oropharyngeal exudate, posterior oropharyngeal erythema or uvula swelling. Tonsils: No tonsillar abscesses. Eyes:      Pupils: Pupils are equal, round, and reactive to light. Neck:      Musculoskeletal: Normal range of motion and neck supple. Normal range of motion. No neck rigidity, spinous process tenderness or muscular tenderness. Meningeal: Brudzinski's sign and Kernig's sign absent. Comments: No meningismus   No lymphadenopathy   Cardiovascular:      Rate and Rhythm: Normal rate and regular rhythm. Heart sounds: Normal heart sounds. No murmur. Pulmonary:      Effort: Pulmonary effort is normal. No respiratory distress. Breath sounds: Wheezing present. No rales. Comments: decreased lung sounds on the right faint wheeze heard on the left inspiratory and expiratory  Abdominal:      General: Bowel sounds are normal.      Palpations: Abdomen is soft. Tenderness: There is no abdominal tenderness. Musculoskeletal: Normal range of motion. Comments: No lower leg edema or swelling or calf tenderness   Skin:     General: Skin is warm and dry. Neurological:      Mental Status: She is alert and oriented to person, place, and time. GCS: GCS eye subscore is 4. GCS verbal subscore is 5. GCS motor subscore is 6. Cranial Nerves: No cranial nerve deficit. Sensory: No sensory deficit. Motor: No tremor, atrophy or abnormal muscle tone. Coordination: Coordination normal.      Gait: Gait normal.      Comments: No neuro deficit   No pronator drift  Normal finger nose finger  N/V intact distally   Strength 5/5 and equal in all extremities   No slurred speech   No facial droop    Psychiatric:         Judgment: Judgment normal.         Diagnostic Study Results     Labs:     Recent Results (from the past 12 hour(s))   EKG, 12 LEAD, INITIAL    Collection Time: 03/30/21  3:30 PM   Result Value Ref Range    Ventricular Rate 76 BPM    Atrial Rate 76 BPM    P-R Interval 170 ms    QRS Duration 128 ms    Q-T Interval 424 ms    QTC Calculation (Bezet) 477 ms    Calculated P Axis 73 degrees    Calculated R Axis 60 degrees    Calculated T Axis 8 degrees    Diagnosis       Normal sinus rhythm  Right bundle branch block  Cannot rule out Inferior infarct (cited on or before 08-AUG-2020)  Abnormal ECG  When compared with ECG of 18-DEC-2020 18:01,  No significant change was found     CBC WITH AUTOMATED DIFF    Collection Time: 03/30/21  3:50 PM   Result Value Ref Range    WBC 6.1 4.6 - 13.2 K/uL    RBC 4.24 4.20 - 5.30 M/uL    HGB 12.4 12.0 - 16.0 g/dL    HCT 36.6 35.0 - 45.0 %    MCV 86.3 74.0 - 97.0 FL    MCH 29.2 24.0 - 34.0 PG    MCHC 33.9 31.0 - 37.0 g/dL    RDW 13.6 11.6 - 14.5 %    PLATELET 041 952 - 931 K/uL    MPV 8.8 (L) 9.2 - 11.8 FL    NEUTROPHILS 63 40 - 73 %    LYMPHOCYTES 30 21 - 52 %    MONOCYTES 7 3 - 10 %    EOSINOPHILS 0 0 - 5 %    BASOPHILS 0 0 - 2 %    ABS. NEUTROPHILS 3.8 1.8 - 8.0 K/UL    ABS. LYMPHOCYTES 1.8 0.9 - 3.6 K/UL    ABS. MONOCYTES 0.4 0.05 - 1.2 K/UL    ABS. EOSINOPHILS 0.0 0.0 - 0.4 K/UL    ABS.  BASOPHILS 0.0 0.0 - 0.1 K/UL    DF AUTOMATED     CARDIAC PANEL,(CK, CKMB & TROPONIN)    Collection Time: 03/30/21  3:50 PM   Result Value Ref Range    CK - MB 1.6 <3.6 ng/ml    CK-MB Index 1.1 0.0 - 4.0 %     26 - 192 U/L    Troponin-I, QT <0.02 0.0 - 0.650 NG/ML   METABOLIC PANEL, COMPREHENSIVE    Collection Time: 03/30/21  3:50 PM   Result Value Ref Range    Sodium 137 136 - 145 mmol/L    Potassium 3.9 3.5 - 5.5 mmol/L    Chloride 100 100 - 111 mmol/L    CO2 32 21 - 32 mmol/L    Anion gap 5 3.0 - 18 mmol/L    Glucose 211 (H) 74 - 99 mg/dL    BUN 13 7.0 - 18 MG/DL    Creatinine 1.01 0.6 - 1.3 MG/DL    BUN/Creatinine ratio 13 12 - 20      GFR est AA >60 >60 ml/min/1.73m2    GFR est non-AA 56 (L) >60 ml/min/1.73m2    Calcium 9.4 8.5 - 10.1 MG/DL    Bilirubin, total 0.5 0.2 - 1.0 MG/DL    ALT (SGPT) 39 13 - 56 U/L    AST (SGOT) 21 10 - 38 U/L    Alk. phosphatase 99 45 - 117 U/L    Protein, total 7.3 6.4 - 8.2 g/dL    Albumin 3.7 3.4 - 5.0 g/dL    Globulin 3.6 2.0 - 4.0 g/dL    A-G Ratio 1.0 0.8 - 1.7     NT-PRO BNP    Collection Time: 03/30/21  3:50 PM   Result Value Ref Range    NT pro-BNP 89 0 - 900 PG/ML   D DIMER    Collection Time: 03/30/21  3:50 PM   Result Value Ref Range    D DIMER 0.27 <0.46 ug/ml(FEU)   COVID-19 RAPID TEST    Collection Time: 03/30/21  4:00 PM   Result Value Ref Range    Specimen source Nasopharyngeal      COVID-19 rapid test Not detected NOTD     POC G3    Collection Time: 03/30/21  5:12 PM   Result Value Ref Range    Device: NASAL CANNULA      Flow rate (POC) 2 L/M    FIO2 (POC) 28 %    pH (POC) 7.43 7.35 - 7.45      pCO2 (POC) 43.3 35.0 - 45.0 MMHG    pO2 (POC) 104 (H) 80 - 100 MMHG    HCO3 (POC) 28.8 (H) 22 - 26 MMOL/L    sO2 (POC) 98 (H) 92 - 97 %    Base excess (POC) 5 mmol/L    Allens test (POC) YES      Total resp.  rate 22      Site RIGHT RADIAL      Specimen type (POC) ARTERIAL      Performed by [address], BASIC    Collection Time: 03/30/21  8:16 PM   Result Value Ref Range    Sodium 137 136 - 145 mmol/L    Potassium 4.1 3.5 - 5.5 mmol/L    Chloride 102 100 - 111 mmol/L    CO2 29 21 - 32 mmol/L    Anion gap 6 3.0 - 18 mmol/L    Glucose 237 (H) 74 - 99 mg/dL    BUN 13 7.0 - 18 MG/DL    Creatinine 1.03 0.6 - 1.3 MG/DL    BUN/Creatinine ratio 13 12 - 20      GFR est AA >60 >60 ml/min/1.73m2    GFR est non-AA 54 (L) >60 ml/min/1.73m2    Calcium 9.6 8.5 - 10.1 MG/DL       Radiologic Studies:   XR CHEST PORT   Final Result      No acute cardiopulmonary disease. CT Results  (Last 48 hours)    None        CXR Results  (Last 48 hours)               03/30/21 1543  XR CHEST PORT Final result    Impression:      No acute cardiopulmonary disease. Narrative:  CHEST AP PORTABLE       Indication: Shortness of breath. Comparison: X-ray 12/18/2020 and 07/25/2020. Findings: The lungs appear clear. The cardiac silhouette and pulmonary   vascularity appear within normal limits. No evidence for pneumothorax or pleural   effusion. Medical Decision Making   I am the first provider for this patient. I reviewed the vital signs, available nursing notes, past medical history, past surgical history, family history and social history. Vital Signs: Reviewed the patient's vital signs. Pulse Oximetry Analysis: 95% on RA     Cardiac Monitor:  Rate: 81 bpm  Rhythm: NSR     EKG interpretation: (Preliminary)  3:39 PM   Normal sinus rhythm right bundle branch block no change from prior on 12/18/20  EKG read by Jim Kerns PA-C at 4432      Records Reviewed: Nursing Notes, Old Medical Records and Previous electrocardiograms    3/22/2021  Echo  · Image quality for this study was suboptimal. Contrast used: DEFINITY. · LV: Estimated LVEF is 60 - 65%. Normal cavity size and systolic function (ejection fraction normal). Mild concentric hypertrophy. Mild (grade 1) left ventricular diastolic dysfunction E/E' ratio is 14.15.  · LA: Mildly dilated left atrium. · RV: Mildly dilated right ventricle. · RA: Mildly dilated right atrium. · PA: Pulmonary arterial systolic pressure is 27 mmHg. Pulmonary hypertension not suggested by Doppler findings. · Pericardium: Pericardial fat pad present.       Cardiac cath 12/21/20  Left Main   The vessel originates from a separate ostium from the aorta. Left Anterior Descending   Mid LAD lesion, 50% stenosed. Left Circumflex   Mid Cx lesion, 50% stenosed. Right Coronary Artery   The vessel was visualized by angiography. The vessel is angiographically normal.         Procedures:  Procedures    ED Course:   3:39 PM Initial assessment performed. The patients presenting problems have been discussed, and they are in agreement with the care plan formulated and outlined with them. I have encouraged them to ask questions as they arise throughout their visit. Discussion:  Pt presents with shortness of breath and chest pain gradually worsening over the past couple days. Patient has a history of COPD and does take prednisone and azithromycin preventatively. She reports a history of CHF but recently had an echo that showed EF of 55% and her BMP today was within normal limits. All the labs within normal limits with the exception of hyperglycemia. Covid negative. Chest x-ray read as no acute process. D-dimer normal.  Patient feeling better after 3 breathing treatments magnesium and Solu-Medrol. Patient feels well enough to go home does not wish to be admitted. Is able to ambulate to the bathroom without assistance. Will give short course of prednisone and advised patient to watch her blood sugar carefully while on this medication as it can increase her blood sugar. Patient understands and agrees with plan we will have her follow-up with her doctor. . Strict return precautions given, pt offering no questions or complaints. Diagnosis and Disposition     DISCHARGE NOTE:  Devonte De Dios's  results have been reviewed with her. She has been counseled regarding her diagnosis, treatment, and plan. She verbally conveys understanding and agreement of the signs, symptoms, diagnosis, treatment and prognosis and additionally agrees to follow up as discussed.   She also agrees with the care-plan and conveys that all of her questions have been answered. I have also provided discharge instructions for her that include: educational information regarding their diagnosis and treatment, and list of reasons why they would want to return to the ED prior to their follow-up appointment, should her condition change. She has been provided with education for proper emergency department utilization. CLINICAL IMPRESSION:    1. COPD exacerbation ([address]    2. Dyspnea, unspecified type    3. Hyperglycemia        PLAN:  1. D/C Home  2. Current Discharge Medication List      START taking these medications    Details   predniSONE (STERAPRED DS) 10 mg dose pack Take as directed  Qty: 21 Tab, Refills: 0         CONTINUE these medications which have NOT CHANGED    Details   predniSONE (DELTASONE) 5 mg tablet Take 5mg on alternating days with 2.5mg the other days  Qty: 90 Tab, Refills: 1    Associated Diagnoses: Severe persistent asthma dependent on systemic steroids           3. Follow-up Information     Follow up With Specialties Details Why Contact Nataliia Bah MD Family Medicine Schedule an appointment as soon as possible for a visit  Skyler Bonds  [phone]                   Please note that this dictation was completed with [employer], the [employer] voice recognition software. Quite often unanticipated grammatical, syntax, homophones, and other interpretive errors are inadvertently transcribed by the computer software. Please disregard these errors. Please excuse any errors that have escaped final proofreading.

## 2021-03-30 NOTE — ED NOTES
Bedside report received from JOSÉ MIGUEL Sabillon at this time. Patient in NAD, resting comfortably, VSS, bed low and locked.

## 2021-03-31 LAB — SARS-COV-2, COV2NT: NOT DETECTED

## 2021-04-04 LAB
ATRIAL RATE: 76 BPM
CALCULATED P AXIS, ECG09: 73 DEGREES
CALCULATED R AXIS, ECG10: 60 DEGREES
CALCULATED T AXIS, ECG11: 8 DEGREES
DIAGNOSIS, 93000: NORMAL
P-R INTERVAL, ECG05: 170 MS
Q-T INTERVAL, ECG07: 424 MS
QRS DURATION, ECG06: 128 MS
QTC CALCULATION (BEZET), ECG08: 477 MS
VENTRICULAR RATE, ECG03: 76 BPM

## 2021-04-08 RX ORDER — INSULIN LISPRO 100 [IU]/ML
INJECTION, SOLUTION INTRAVENOUS; SUBCUTANEOUS
COMMUNITY
Start: 2021-03-10 | End: 2021-04-27

## 2021-04-11 DIAGNOSIS — J45.51 SEVERE PERSISTENT ASTHMA WITH EXACERBATION: ICD-10-CM

## 2021-04-12 ENCOUNTER — TELEPHONE (OUTPATIENT)
Dept: PULMONOLOGY | Age: 61
End: 2021-04-12

## 2021-04-12 NOTE — TELEPHONE ENCOUNTER
Aravind from [facility] prior [facility] department states [facility]. Please advise 21 .

## 2021-04-13 ENCOUNTER — TELEPHONE (OUTPATIENT)
Dept: PULMONOLOGY | Age: 61
End: 2021-04-13

## 2021-04-13 NOTE — TELEPHONE ENCOUNTER
New [employer] plan has faxed a new PA form they state is needed since the past PA was denied we have to start over with new one. They state to complete the form and send office notes with the form.  AGN#[phone]

## 2021-04-13 NOTE — TELEPHONE ENCOUNTER
HPI:    Patient ID: Lola Arreaga is a 34year old female. HPI    Review of Systems   Constitutional: Negative. Eyes: Positive for pain. Respiratory: Negative. Cardiovascular: Negative. Gastrointestinal: Negative. Skin: Negative.     Casilda Koyanagi Pt is very upset and wants to know why her infusion for tomorrow was denied and what can be done about it, because she really needs infusion. Please call [phone]. Lipid Panel [E]      Glucose, Serum [E]      Sjogren's AB, Anti-SS-A/-SS-B [E]      CBC, Platelet, No Differential [E]      Meds This Visit:  Requested Prescriptions     Signed Prescriptions Disp Refills   • Norethindrone (ORTHO MICRONOR) 0.35 MG Oral Tab

## 2021-04-14 ENCOUNTER — HOSPITAL ENCOUNTER (OUTPATIENT)
Dept: INFUSION THERAPY | Age: 61
End: 2021-04-14

## 2021-04-14 ENCOUNTER — TELEPHONE (OUTPATIENT)
Dept: PULMONOLOGY | Age: 61
End: 2021-04-14

## 2021-04-14 NOTE — TELEPHONE ENCOUNTER
Page from [employer] [facility]([phone]). She needs to talk to nurse . Authorization was denied for [facility] infusion and they need an approval or resubmit. Please call her back.

## 2021-04-14 NOTE — TELEPHONE ENCOUNTER
spoke with Page with [facility]. Advised 2nd attempt for PA faxed yesterday as requested by Meera with all documentation and labs. Page requested copy of sent PA to follow up on the PA.  Done

## 2021-04-20 ENCOUNTER — TELEPHONE (OUTPATIENT)
Dept: PULMONOLOGY | Age: 61
End: 2021-04-20

## 2021-04-20 NOTE — TELEPHONE ENCOUNTER
Stacy Prakash from [place] states he needs to speak w/ nurse to see if Fly Lange can be approved. Please advise [phone] Opt 5. OR suggesting an appeal be done by calling [phone].

## 2021-04-21 ENCOUNTER — OFFICE VISIT (OUTPATIENT)
Dept: CARDIOLOGY CLINIC | Age: 61
End: 2021-04-21
Payer: MEDICARE

## 2021-04-21 VITALS
OXYGEN SATURATION: 95 % | DIASTOLIC BLOOD PRESSURE: 59 MMHG | HEART RATE: 85 BPM | BODY MASS INDEX: 46.35 KG/M2 | HEIGHT: 63 IN | WEIGHT: 261.6 LBS | SYSTOLIC BLOOD PRESSURE: 128 MMHG | TEMPERATURE: 98.1 F

## 2021-04-21 DIAGNOSIS — E66.01 MORBID OBESITY (HCC): ICD-10-CM

## 2021-04-21 DIAGNOSIS — I50.32 DIASTOLIC CHF, CHRONIC (HCC): Primary | ICD-10-CM

## 2021-04-21 DIAGNOSIS — I10 ESSENTIAL HYPERTENSION, BENIGN: ICD-10-CM

## 2021-04-21 DIAGNOSIS — I27.20 PULMONARY HTN (HCC): ICD-10-CM

## 2021-04-21 DIAGNOSIS — J44.9 CHRONIC OBSTRUCTIVE PULMONARY DISEASE, UNSPECIFIED COPD TYPE (HCC): ICD-10-CM

## 2021-04-21 PROCEDURE — 99214 OFFICE O/P EST MOD 30 MIN: CPT | Performed by: NURSE PRACTITIONER

## 2021-04-21 NOTE — PATIENT INSTRUCTIONS
Low Sodium Diet (2,000 Milligram): Care Instructions  Overview     Limiting sodium can be an important part of managing some health problems. The most common source of sodium is salt. People get most of the salt in their diet from canned, prepared, and packaged foods. Fast food and restaurant meals also are very high in sodium. Your doctor will probably limit your sodium to less than 2,000 milligrams (mg) a day. This limit counts all the sodium in prepared and packaged foods and any salt you add to your food. Follow-up care is a key part of your treatment and safety. Be sure to make and go to all appointments, and call your doctor if you are having problems. It's also a good idea to know your test results and keep a list of the medicines you take. How can you care for yourself at home? Read food labels  · Read labels on cans and food packages. The labels tell you how much sodium is in each serving. Make sure that you look at the serving size. If you eat more than the serving size, you have eaten more sodium. · Food labels also tell you the Percent Daily Value for sodium. Choose products with low Percent Daily Values for sodium. · Be aware that sodium can come in forms other than salt, including monosodium glutamate (MSG), sodium citrate, and sodium bicarbonate (baking soda). MSG is often added to Asian food. When you eat out, you can sometimes ask for food without MSG or added salt. Buy low-sodium foods  · Buy foods that are labeled \"unsalted\" (no salt added), \"sodium-free\" (less than 5 mg of sodium per serving), or \"low-sodium\" (140 mg or less of sodium per serving). Foods labeled \"reduced-sodium\" and \"light sodium\" may still have too much sodium. Be sure to read the label to see how much sodium you are getting. · Buy fresh vegetables, or frozen vegetables without added sauces. Buy low-sodium versions of canned vegetables, soups, and other canned goods.   Prepare low-sodium meals  · Cut back on the amount of salt you use in cooking. This will help you adjust to the taste. Do not add salt after cooking. One teaspoon of salt has about 2,300 mg of sodium. · Take the salt shaker off the table. · Flavor your food with garlic, lemon juice, onion, vinegar, herbs, and spices. Do not use soy sauce, lite soy sauce, steak sauce, onion salt, garlic salt, celery salt, or ketchup on your food. · Use low-sodium salad dressings, sauces, and ketchup. Or make your own salad dressings and sauces without adding salt. · Use less salt (or none) when recipes call for it. You can often use half the salt a recipe calls for without losing flavor. Other foods such as rice, pasta, and grains do not need added salt. · Rinse canned vegetables, and cook them in fresh water. This removes some--but not all--of the salt. · Avoid water that is naturally high in sodium or that has been treated with water softeners, which add sodium. If you buy bottled water, read the label and choose a sodium-free brand. Avoid high-sodium foods  · Avoid eating:  ? Smoked, cured, salted, and canned meat, fish, and poultry. ? Ham, cueva, hot dogs, and luncheon meats. ? Regular, hard, and processed cheese and regular peanut butter. ? Crackers with salted tops, and other salted snack foods such as pretzels, chips, and salted popcorn. ? Frozen prepared meals, unless labeled low-sodium. ? Canned and dried soups, broths, and bouillon, unless labeled sodium-free or low-sodium. ? Canned vegetables, unless labeled sodium-free or low-sodium. ? Western Litzy fries, pizza, tacos, and other fast foods. ? Pickles, olives, ketchup, and other condiments, especially soy sauce, unless labeled sodium-free or low-sodium. Where can you learn more? Go to http://www.gray.com/  Enter V843 in the search box to learn more about \"Low Sodium Diet (2,000 Milligram): Care Instructions. \"  Current as of: December 17, 2020               Content Version: 12.8  © 9756-3144 Healthwise, Incorporated. Care instructions adapted under license by [employer] (which disclaims liability or warranty for this information). If you have questions about a medical condition or this instruction, always ask your healthcare professional. [address] any warranty or liability for your use of this information.

## 2021-04-21 NOTE — PROGRESS NOTES
1. Have you been to the ER, urgent care clinic since your last visit? Hospitalized since your last visit? Yes When: 03/30/21 Where: [facility] Reason for visit: COPD    2. Have you seen or consulted any other health care providers outside of the [address] [place] [address] since your last visit? Include any pap smears or colon screening.  No

## 2021-04-21 NOTE — PROGRESS NOTES
HISTORY OF PRESENT ILLNESS  Alma Rosa Rodriguez is a 63 y.o. female. 3/17/2021 - Ms. Jailyn Laughlin presents to f/u for edema. She c/o dizziness after trying Metolazone for 2 days, therefore stopped taking it.    4/21/2021  Patient presents to f/u for SOB and edema. She was again unable to tolerate Metolazone due to dizziness. She has continued to take lasix BID. She is following low sodium diet and weight is down 5 lbs. She continues to f/u with pulmonary and wears continuous oxygen. Hypertension  The history is provided by the patient. This is a chronic problem. The problem has not changed since onset. Associated symptoms include shortness of breath. CHF  The history is provided by the patient. This is a chronic problem. The problem occurs daily. The problem has been gradually worsening. Associated symptoms include shortness of breath. Shortness of Breath  The history is provided by the patient. This is a chronic problem. The problem occurs frequently. The problem has been gradually worsening. Associated symptoms include leg swelling. Pertinent negatives include no fever, no ear pain, no neck pain, no cough, no sputum production, no hemoptysis, no wheezing, no PND, no orthopnea, no vomiting, no rash and no claudication. Associated medical issues include COPD. Follow-up  Associated symptoms include shortness of breath. Review of Systems   Constitutional: Negative for chills, diaphoresis, fever and weight loss. HENT: Negative for ear pain and hearing loss. Eyes: Negative for blurred vision. Respiratory: Positive for shortness of breath. Negative for cough, hemoptysis, sputum production, wheezing and stridor. Cardiovascular: Positive for leg swelling. Negative for palpitations, orthopnea, claudication and PND. Gastrointestinal: Negative for heartburn, nausea and vomiting. Musculoskeletal: Negative for myalgias and neck pain. Skin: Negative for rash.    Neurological: Negative for dizziness, tingling, tremors, focal weakness, loss of consciousness and weakness. Psychiatric/Behavioral: Negative for depression and suicidal ideas. Physical Exam   Constitutional: She is oriented to person, place, and time. She appears well-developed and well-nourished. HENT:   Head: Normocephalic and atraumatic. Eyes: Conjunctivae and EOM are normal. No scleral icterus. Neck: Normal range of motion. Neck supple. No JVD present. Cardiovascular: Normal rate, regular rhythm and normal heart sounds. Exam reveals no gallop and no friction rub. No murmur heard. Pulmonary/Chest: Effort normal. No stridor. No respiratory distress. She has no wheezes. She has no rales. She exhibits no tenderness. Abdominal: She exhibits no distension. There is no abdominal tenderness. Musculoskeletal: Normal range of motion. General: No tenderness or edema. Lymphadenopathy:     She has no cervical adenopathy. Neurological: She is alert and oriented to person, place, and time. No cranial nerve deficit. Skin: No rash noted. No erythema. Psychiatric: She has a normal mood and affect. Her behavior is normal.   Nursing note and vitals reviewed. ekg sinus rhythm with non specific st-t changes  05/21/20   ECHO ADULT COMPLETE 05/25/2020 5/25/2020    Narrative · Image quality for this study was technically difficult. Contrast used:   DEFINITY. · Normal cavity size and systolic function (ejection fraction normal). Moderate concentric hypertrophy. Estimated left ventricular ejection   fraction is 65 - 70%. Visually measured ejection fraction. No regional   wall motion abnormality noted. Moderate (grade 2) left ventricular   diastolic dysfunction. · Mildly dilated left atrium. · Pulmonary hypertension. Pulmonary arterial systolic pressure is 61 mmHg. · Severely elevated central venous pressure (15+ mmHg); IVC diameter is   larger than 21 mm and collapses less than 50% with respiration.         Signed by: Winsome Hardwick MD     12/18/20   CARDIAC PROCEDURE 12/22/2020 12/22/2020    Narrative Elevated LVEDP. Normal LV function. Moderate 2 vessel coronary artery disease best treated medically  Continue intense risk factor modification. Signed by: Gertrudis Loredo MD      Echo 3/22/21  Interpretation Summary  · Image quality for this study was suboptimal. Contrast used: DEFINITY. · LV: Estimated LVEF is 60 - 65%. Normal cavity size and systolic function (ejection fraction normal). Mild concentric hypertrophy. Mild (grade 1) left ventricular diastolic dysfunction E/E' ratio is 14.15.  · LA: Mildly dilated left atrium. · RV: Mildly dilated right ventricle. · RA: Mildly dilated right atrium. · PA: Pulmonary arterial systolic pressure is 27 mmHg. Pulmonary hypertension not suggested by Doppler findings. · Pericardium: Pericardial fat pad present. ASSESSMENT and PLAN    ICD-10-CM FFR-0-BC    1. Diastolic CHF, chronic (HCC)  I50.32 428.32      428.0     EF 60-65% mild DD, continue current medications - appears compensated. 2. Pulmonary HTN (HCC)  I27.20 416.8     Resolved by recent echo with Pulmonary arterial systolic pressure is 27 mmHg. 3. Essential hypertension, benign  I10 401.1     Stable   4. Morbid obesity ([address]  E66.01 278.01     Encouraged diet to promote weight loss   5. Chronic obstructive pulmonary disease, unspecified COPD type ([address]  J44.9 496     Supplemental oxygen 2 L / NC  BiPap at night     No orders of the defined types were placed in this encounter. Follow-up and Dispositions    · Return in about 4 months (around 8/21/2021), or if symptoms worsen or fail to improve, for Follow up with Dr. Mirna Braden. reviewed diet, exercise and weight control. Patient is a 63 yr old female with severe COPD on meds seen for worsening dyspnea--   Recent cath revealed moderate two-vessel coronary artery disease with significant elevated left ventricular end-diastolic pressure.    She was started on Lasix 40 mg twice daily. Returns to clinic for follow-up. Complains of mild worsening exertional dyspnea and lower extremity edema. We will add metolazone 5 mg daily for 3 days and then 3 times per week. Repeat BMP/mag to evaluate renal function. 3/17/2021  Ms. Oleksandr Forman reports was unable to tolerate Metolazone due to dizziness - she felt this was dropping her b/p, therefore she did not take it  She reports worsening SOB and fluid retention since she had COVID in July  Will resume Metolazone 2.5 mg 30 minutes prior to lasix, continue lasix BID. Take 3 days consecutively then decrease to 2-3 times weekly  On days she takes Metolazone, decrease lisinopril to 10 mg in effort to prevent hypotension. Check BMP ( had drawn today with [facility])  Low sodium diet  Check Echo   F/u in 1 month post testing.    4/21/2021  Patient was unable to tolerate Metolazone and did not take it.   She has continued to take Lasix 40 mg BID, and lisinopril 20 mg / day  Echo reviewed and discussed with patient - normal LV function 60-65%, mild 1 DD and normal PA pressures of 27 mm Hg  BMP reviewed and discussed with patient  Recommend to continue current medications

## 2021-04-21 NOTE — TELEPHONE ENCOUNTER
Called and spoke with Jose Valle the PA pharmacist. He states there protacol is strickly they have to have a eoph count of 300 or greater prior to starting [place] or other biologic. I explained the patient long history of steroid use but he states that would have to be determined by the Appeals doctor and a number to call was given and I was transferred. The lady at that number gave a Appeals fax number and requested we fax the PA with documentation to the Appeals team. She gave me the case number and the documentation number of the on file documents.  All faxed to be reviewed by the [facility] team.   [facility] fax #[phone]  [address] [facility] # [id number]  Document #[id number]

## 2021-04-22 NOTE — TELEPHONE ENCOUNTER
Nasrin Castro with [facility] department called.  Approved the Xenia Beauchamp starting 4/20/21-4/22/22

## 2021-04-27 ENCOUNTER — OFFICE VISIT (OUTPATIENT)
Dept: PULMONOLOGY | Age: 61
End: 2021-04-27
Payer: MEDICARE

## 2021-04-27 ENCOUNTER — HOSPITAL ENCOUNTER (OUTPATIENT)
Dept: INFUSION THERAPY | Age: 61
Discharge: HOME | End: 2021-04-27
Payer: MEDICARE

## 2021-04-27 VITALS
SYSTOLIC BLOOD PRESSURE: 145 MMHG | RESPIRATION RATE: 16 BRPM | TEMPERATURE: 97.6 F | OXYGEN SATURATION: 94 % | DIASTOLIC BLOOD PRESSURE: 77 MMHG

## 2021-04-27 VITALS
SYSTOLIC BLOOD PRESSURE: 132 MMHG | HEIGHT: 63 IN | DIASTOLIC BLOOD PRESSURE: 63 MMHG | RESPIRATION RATE: 18 BRPM | OXYGEN SATURATION: 95 % | HEART RATE: 81 BPM | BODY MASS INDEX: 46.39 KG/M2 | TEMPERATURE: 95.6 F | WEIGHT: 261.8 LBS

## 2021-04-27 DIAGNOSIS — J96.22 ACUTE ON CHRONIC RESPIRATORY FAILURE WITH HYPOXIA AND HYPERCAPNIA (HCC): ICD-10-CM

## 2021-04-27 DIAGNOSIS — J45.50 SEVERE PERSISTENT ASTHMA WITHOUT COMPLICATION: ICD-10-CM

## 2021-04-27 DIAGNOSIS — J44.9 COPD WITH ASTHMA (HCC): Primary | ICD-10-CM

## 2021-04-27 DIAGNOSIS — E66.01 MORBID OBESITY (HCC): ICD-10-CM

## 2021-04-27 DIAGNOSIS — E66.2 OBESITY HYPOVENTILATION SYNDROME (HCC): ICD-10-CM

## 2021-04-27 DIAGNOSIS — J45.51 SEVERE PERSISTENT ASTHMA WITH EXACERBATION: Primary | ICD-10-CM

## 2021-04-27 DIAGNOSIS — Z79.52 SEVERE PERSISTENT ASTHMA DEPENDENT ON SYSTEMIC STEROIDS: ICD-10-CM

## 2021-04-27 DIAGNOSIS — J45.50 SEVERE PERSISTENT ASTHMA DEPENDENT ON SYSTEMIC STEROIDS: ICD-10-CM

## 2021-04-27 DIAGNOSIS — J96.21 ACUTE ON CHRONIC RESPIRATORY FAILURE WITH HYPOXIA AND HYPERCAPNIA (HCC): ICD-10-CM

## 2021-04-27 DIAGNOSIS — J45.51 POORLY CONTROLLED SEVERE PERSISTENT ASTHMA WITH ACUTE EXACERBATION: ICD-10-CM

## 2021-04-27 DIAGNOSIS — J44.9 COPD, GROUP D, BY GOLD 2017 CLASSIFICATION (HCC): ICD-10-CM

## 2021-04-27 DIAGNOSIS — R06.09 DYSPNEA ON EXERTION: ICD-10-CM

## 2021-04-27 DIAGNOSIS — I27.20 PULMONARY HYPERTENSION (HCC): ICD-10-CM

## 2021-04-27 DIAGNOSIS — J44.9 ASTHMA-COPD OVERLAP SYNDROME (HCC): ICD-10-CM

## 2021-04-27 PROCEDURE — G8754 DIAS BP LESS 90: HCPCS | Performed by: INTERNAL MEDICINE

## 2021-04-27 PROCEDURE — G8427 DOCREV CUR MEDS BY ELIG CLIN: HCPCS | Performed by: INTERNAL MEDICINE

## 2021-04-27 PROCEDURE — G8417 CALC BMI ABV UP PARAM F/U: HCPCS | Performed by: INTERNAL MEDICINE

## 2021-04-27 PROCEDURE — 3017F COLORECTAL CA SCREEN DOC REV: CPT | Performed by: INTERNAL MEDICINE

## 2021-04-27 PROCEDURE — G8752 SYS BP LESS 140: HCPCS | Performed by: INTERNAL MEDICINE

## 2021-04-27 PROCEDURE — 99214 OFFICE O/P EST MOD 30 MIN: CPT | Performed by: INTERNAL MEDICINE

## 2021-04-27 PROCEDURE — 96372 THER/PROPH/DIAG INJ SC/IM: CPT

## 2021-04-27 PROCEDURE — 74011250636 HC RX REV CODE- 250/636: Performed by: INTERNAL MEDICINE

## 2021-04-27 PROCEDURE — G8510 SCR DEP NEG, NO PLAN REQD: HCPCS | Performed by: INTERNAL MEDICINE

## 2021-04-27 PROCEDURE — G9899 SCRN MAM PERF RSLTS DOC: HCPCS | Performed by: INTERNAL MEDICINE

## 2021-04-27 RX ORDER — AZITHROMYCIN 250 MG/1
250 TABLET, FILM COATED ORAL
Qty: 30 TAB | Refills: 3 | Status: SHIPPED | OUTPATIENT
Start: 2021-04-28 | End: 2022-06-28

## 2021-04-27 RX ORDER — FLUTICASONE PROPIONATE 220 UG/1
1 AEROSOL, METERED RESPIRATORY (INHALATION) 2 TIMES DAILY
Qty: 3 INHALER | Refills: 3 | Status: SHIPPED | OUTPATIENT
Start: 2021-04-27 | End: 2021-08-18

## 2021-04-27 RX ADMIN — BENRALIZUMAB 30 MG: 30 INJECTION, SOLUTION SUBCUTANEOUS at 09:41

## 2021-04-27 NOTE — PROGRESS NOTES
Shea Mustafa presents today for   Chief Complaint   Patient presents with    COPD     follow up from 1/18/2021   [address] [place] [address] Asthma    Breathing Problem     CHAUDHARY    Results     COVDI (-) 3/20/2021, CXR 3/30/2021, CXR 2/15/2021 [facility]), Echo 3/22/2021       Is someone accompanying this pt? No    Is the patient using any DME equipment during OV? Yes. [employer] & O2, nebulizer, walker   -[employer]    Depression Screening:  3 most recent PHQ Screens 4/27/2021   Little interest or pleasure in doing things Not at all   Feeling down, depressed, irritable, or hopeless Not at all   Total Score PHQ 2 0       Learning Assessment:  Learning Assessment 4/9/2018   PRIMARY LEARNER Patient   HIGHEST LEVEL OF EDUCATION - PRIMARY LEARNER  SOME COLLEGE   BARRIERS PRIMARY LEARNER -   PRIMARY LANGUAGE ENGLISH   LEARNER PREFERENCE PRIMARY READING   ANSWERED BY patient López Vasquez   RELATIONSHIP SELF       Abuse Screening:  Abuse Screening Questionnaire 4/27/2021   Do you ever feel afraid of your partner? N   Are you in a relationship with someone who physically or mentally threatens you? N   Is it safe for you to go home? Y       Fall Risk  Fall Risk Assessment, last 12 mths 3/2/2021   Able to walk? Yes   Fall in past 12 months? 1   Do you feel unsteady? 0   Are you worried about falling 0   Number of falls in past 12 months 1   Fall with injury? 0         Coordination of Care:  1. Have you been to the ER, urgent care clinic since your last visit? Hospitalized since your last visit? Yes; Where: [facility] ED & [facility] ED, When: 2/15/201-COPD exacerbation, SOB & Cough & 3/30/2021-dyspnea, COPD exacerbation and hyperglycemia    2. Have you seen or consulted any other health care providers outside of the [address] [place] [address] since your last visit? Include any pap smears or colon screening. Yes.  Dr. Saeed Briceño, PCP

## 2021-04-27 NOTE — PROGRESS NOTES
[facility] Progress Note    Date: 2021    Name: Marcia Seaman    MRN: [id number]         : 1960    Fly Lange     Ms. Mary Kay Pérez arrived to [facility] at 0930. Ms. Mary Kay Pérez was assessed and education was provided. Ms. Blunt Core vitals were reviewed. Visit Vitals  BP (!) 145/77 (BP 1 Location: Left upper arm, BP Patient Position: Sitting)   Temp 97.6 °F (36.4 °C)   Resp 16   SpO2 94%   Breastfeeding No       Fasenra 30 mg was administered as ordered SQ in patient's left upper arm. Band aid applied to site. Ms. Mary Kay Pérez tolerated well without complaints. Pt's armband removed and shredded. Fasenra drug information and discharge instructions discussed with Ms Mary Kay Pérez and she verbalized understanding. Ms. Mary Kay Pérez was discharged from [place] [address] in stable condition at [address] She is to return on 21 at 1000 for her next appointment.     Gene Frazier RN  2021

## 2021-04-27 NOTE — LETTER
4/29/2021 Patient: Treasure Gruber YOB: 1960 Date of Visit: 4/27/2021 Cornell Soulier, [place] [address] [place], [place] [id number] [facility] [employer] Dear Cornell Soulier, MD, Thank you for referring Ms. Zaida Santana to [address] [place] [address] for evaluation. My notes for this consultation are attached. If you have questions, please do not hesitate to call me. I look forward to following your patient along with you. Sincerely, Swapna Alcaraz MD

## 2021-04-27 NOTE — PROGRESS NOTES
[address] [place], [place] [address]ille [phone]    [address] [place] [address] [facility]  Pulmonary, Critical Care, and Sleep Medicine    Pulmonary Office F/U  Name: Idania Argueta 63 y.o. female  MRN: [id number]  : 1960  Service Date: 21  Chief Complaint:   Chief Complaint   Patient presents with    COPD     follow up from 2021   [facility] Asthma    Breathing Problem     CHAUDHARY    Results     COVDI (-) 3/20/2021, CXR 3/30/2021, CXR 2/15/2021 [facility]), Echo 3/22/2021       History of Present Illness:  Idania Argueta is a 63 y.o. female, who presents to Pulmonary clinic for F/U of asthma/COPD. Pt last seen in our clinic on 2021. Pt had one exacerbation in the interval -- went to the ER on 3/30 -- got a steroid dosepak taper for 7 days. Pt reports she's back at her baseline. Does report that her symptoms worsened over the last few days -- pt did not get her Fasenra injection on schedule due to insurance issues. This was sorted out and she received her injection this morning.   Pt on chronic prednisone -- pt on 5mg/2.5mg every other day  Pt has osteoporosis -- pt placed on prolia injections      Past Medical History:   Diagnosis Date    Asthma     Chronic lung disease     COPD     Cystocele, midline     Diabetes mellitus ([facility] [address]     GERD (gastroesophageal reflux disease)     Hidradenitis suppurativa     Hyperlipidemia     Hypertension     SHERRIE on CPAP     CPAP    Stress incontinence      Past Surgical History:   Procedure Laterality Date    HX APPENDECTOMY      HX CATARACT REMOVAL Right     HX CHOLECYSTECTOMY      HX DILATION AND CURETTAGE      Dysfunctional uterine bleeding, thought 2/2 fibroids    HX HEART CATHETERIZATION  2020    HX TUBAL LIGATION      NH BREAST SURGERY PROCEDURE UNLISTED      Right breast benign tumor removal     Family History   Problem Relation Age of Onset    Hypertension Mother     Stroke Mother     Breast Cancer Mother Bilateral mastectomies    Cancer Mother         ovarian and breast    Heart Failure Mother     Ovarian Cancer Mother     Heart Attack Father         2011    Heart Surgery Father         CABG    Heart Failure Father     COPD Sister         Heavy smoker    Cancer Sister         ovarian    Heart Failure Sister     Lung Disease Sister     Asthma Child     Cancer Maternal Aunt         pancreatic    Cancer Maternal Grandfather         stomach     Social History     Socioeconomic History    Marital status: LEGALLY      Spouse name: Not on file    Number of children: Not on file    Years of education: Not on file    Highest education level: Not on file   Occupational History    Not on file   Social Needs    Financial resource strain: Not on file    Food insecurity     Worry: Not on file     Inability: Not on file    Transportation needs     Medical: Not on file     Non-medical: Not on file   Tobacco Use    Smoking status: Former Smoker     Packs/day: 1.00     Years: 30.00     Pack years: 30.00     Types: Cigarettes     Start date: 1966     Quit date: 2006     Years since quittin.6    Smokeless tobacco: Former User    Tobacco comment: 1-1.5 packs per day   Substance and Sexual Activity    Alcohol use: No    Drug use: No    Sexual activity: Not Currently   Lifestyle    Physical activity     Days per week: Not on file     Minutes per session: Not on file    Stress: Not on file   Relationships    Social connections     Talks on phone: Not on file     Gets together: Not on file     Attends Mu-ism service: Not on file     Active member of club or organization: Not on file     Attends meetings of clubs or organizations: Not on file     Relationship status: Not on file    Intimate partner violence     Fear of current or ex partner: Not on file     Emotionally abused: Not on file     Physically abused: Not on file     Forced sexual activity: Not on file   Other Topics Concern    Not on file   Social History Narrative    Not on file     Allergies   Allergen Reactions    Ancef [Cefazolin] Hives    Contrast Agent [Iodine] Anaphylaxis, Shortness of Breath and Swelling     Needs pre-medication for IV contrast with Benadryl, Solu-Medrol    Fish Containing Products Anaphylaxis     Pt states she had a severe allergic reaction at 9 y/o.  Statins-Hmg-Coa Reductase Inhibitors Myalgia    Metformin Other (comments)     Abdominal pain, diarrhea.  Codeine Other (comments)     Altered mental status     Prior to Admission medications    Medication Sig Start Date End Date Taking? Authorizing Provider   predniSONE (DELTASONE) 5 mg tablet Take 5mg on alternating days with 2.5mg the other days 1/18/21  Yes Brandon Bauer MD   fluticasone propionate (FLOVENT HFA) 220 mcg/actuation inhaler Take 1 Puff by inhalation two (2) times a day. Rinse and gargle after each use 1/18/21  Yes Brandon Bauer MD   Prolia 60 mg/mL injection INJECT 60MG SUBCUTANEOUSLY AS A SINGLE DOSE ONCE EVERY 6 MONTHS 11/23/20  Yes Provider, Historical   simethicone (GAS-X) 125 mg capsule Take 125 mg by mouth four (4) times daily as needed for Flatulence. Yes Provider, Historical   insulin glargine (LANTUS,BASAGLAR) 100 unit/mL (3 mL) inpn 50 Units by SubCUTAneous route nightly. Indications: type 2 diabetes mellitus   Yes Provider, Historical   furosemide (LASIX) 40 mg tablet Take 1 Tab by mouth two (2) times a day. 12/3/20  Yes Alfredito Perez MD   azithromycin [facility]) 250 mg tablet Take 1 Tab by mouth every Monday, Wednesday, Friday. 11/6/20  Yes Brandon Bauer MD   albuterol-ipratropium (DUO-NEB) 2.5 mg-0.5 mg/3 ml nebu 3 mL by Nebulization route every six (6) hours as needed for Wheezing. Yes Provider, Historical   glucose blood VI test strips (FreeStyle Lite Strips) strip Use four times daily for blood sugar checks.  6/16/17  Yes Provider, Historical   Blood-Glucose Meter (FreeStyle Freedom Lite) monitoring kit CHECK BLOOD SUGARS B.I.D. E11.9 6/16/17  Yes Provider, Historical   fluticasone propionate (FLONASE) 50 mcg/actuation nasal spray 2 Sprays by Nasal route daily. 8/26/10  Yes Provider, Historical   lancets (FreeStyle Lancets) 28 gauge misc Use four times daily for blood sugar checks 6/16/17  Yes Provider, Historical   Insulin Needles, Disposable, (BD Ultra-Fine Mini Pen Needle) 31 gauge x 3/16\" ndle Use with Basaglar once daily. 4/4/18  Yes Provider, Historical   Insulin Syringe-Needle U-100 0.3 mL 31 gauge x 5/16\" syrg USE AS DIRECTED 2/14/17  Yes Provider, Historical   fluticasone propion-salmeteroL (ADVAIR HFA) 739-18 mcg/actuation inhaler Take 2 Puffs by inhalation two (2) times a day. 8/14/20  Yes Sergio John MD   acetaminophen (TYLENOL) 500 mg tablet Take 500 mg by mouth every six (6) hours as needed for Fever or Pain. Yes Provider, Historical   albuterol (PROVENTIL HFA, VENTOLIN HFA, PROAIR HFA) 90 mcg/actuation inhaler Take 2 Puffs by inhalation every four (4) hours as needed for Wheezing. 7/7/20  Yes Evonne Kate PA-C   omeprazole (PRILOSEC) 40 mg capsule Take 40 mg by mouth daily. 3/9/18  Yes Provider, Historical   lisinopril (PRINIVIL, ZESTRIL) 20 mg tablet Take 20 mg by mouth daily. Yes Provider, Historical   tiotropium bromide (SPIRIVA RESPIMAT) 2.5 mcg/actuation inhaler Take 2 Puffs by inhalation daily. Yes Provider, Historical   NOVOLOG FLEXPEN U-100 INSULIN 100 unit/mL inpn Continue home Sliding scale insulin as prior to admission  Patient taking differently: 1 Units by SubCUTAneous route three (3) times daily. If BG <100=0u  101-150=5u  151-250=8u  251-300=12u  >300 call MD   7/10/18  Yes Lacey Schmitz MD   OXYGEN-AIR DELIVERY SYSTEMS 2 L by Nasal route continuous. First Choice between 2L and 3L   Yes Provider, Historical   aspirin delayed-release 81 mg tablet Take 81 mg by mouth daily.    Yes Provider, Historical   montelukast (SINGULAIR) 10 mg tablet Take 10 mg by mouth nightly. Yes Other, MD Lauren   insulin lispro (HUMALOG) 100 unit/mL kwikpen  3/10/21 4/27/21  Provider, Historical     Immunization History   Administered Date(s) Administered    Influenza Vaccine 09/21/2009, 10/21/2011, 12/21/2012, 01/07/2014, 10/20/2014, 10/01/2015, 10/07/2016, 12/31/2019, 11/18/2020    Influenza Vaccine 3TEN8) PF (>6 Mo Flulaval, Fluarix, and >3 Yrs Afluria, Fluzone 28266) 01/26/2018, 10/11/2018    Novel Influenza-H1N1-09, All Formulations 01/29/2010    Pneumococcal Conjugate (PCV-13) 12/31/2019    Pneumococcal Polysaccharide (PPSV-23) 10/11/2018       Review of Systems:  A complete review of systems was performed as stated in the HPI, all others are negative. Objective:    Physical Exam:  /63 (BP 1 Location: Left upper arm, BP Patient Position: Sitting, BP Cuff Size: Adult)   Pulse 81   Temp (!) 95.6 °F (35.3 °C) (Oral)   Resp 18   Ht 5' 3\" (1.6 m)   Wt 118.8 kg (261 lb 12.8 oz)   SpO2 95%   BMI 46.38 kg/m²   Vitals were personally reviewed  Gen: no acute distress, pleasant and cooperative, sitting up in chair, wearing supplemental oxygen, ambulates slowly, obese  HEENT: normocephalic/atraumatic, PERRLA, EOMI, no scleral icterus, nasal bridge midline, unable to assess nasal and oral cavities due to patient wearing mask in the setting of COVID-19 pandemic  Neck: supple, trachea midline, no JVD, no cervical and supraclavicular adenopathy  CVS: regular rate rhythm, S1/S2, no murmurs/rubs/gallops  Lungs: Fair air entry B/L, CTABL, no wheezes/rales/rhonchi  Ext: trace-1+ pitting edema B/L, no peripheral cyanosis or clubbing  Psych: normal memory, thought content, and processing    Labs:   I have reviewed the patient's available labs  Lab Results   Component Value Date/Time    WBC 6.1 03/30/2021 03:50 PM    HGB 12.4 03/30/2021 03:50 PM    HCT 36.6 03/30/2021 03:50 PM    PLATELET 635 30/58/7182 03:50 PM    MCV 86.3 03/30/2021 03:50 PM     Lab Results   Component Value Date/Time    Sodium 137 03/30/2021 08:16 PM    Potassium 4.1 03/30/2021 08:16 PM    Chloride 102 03/30/2021 08:16 PM    CO2 29 03/30/2021 08:16 PM    Anion gap 6 03/30/2021 08:16 PM    Glucose 237 (H) 03/30/2021 08:16 PM    BUN 13 03/30/2021 08:16 PM    Creatinine 1.03 03/30/2021 08:16 PM    BUN/Creatinine ratio 13 03/30/2021 08:16 PM    GFR est AA >60 03/30/2021 08:16 PM    GFR est non-AA 54 (L) 03/30/2021 08:16 PM    Calcium 9.6 03/30/2021 08:16 PM    Bilirubin, total 0.5 03/30/2021 03:50 PM    Alk. phosphatase 99 03/30/2021 03:50 PM    Protein, total 7.3 03/30/2021 03:50 PM    Albumin 3.7 03/30/2021 03:50 PM    Globulin 3.6 03/30/2021 03:50 PM    A-G Ratio 1.0 03/30/2021 03:50 PM    ALT (SGPT) 39 03/30/2021 03:50 PM    AST (SGOT) 21 03/30/2021 03:50 PM     Imaging:  I have personally reviewed patient's imaging --portable chest x-ray from 3/30/2021 shows bilateral interstitial infiltrates, otherwise clear. No masses, consolidations, effusions seen. Official report per radiology:  XR Results (most recent):  Results from Hospital Encounter encounter on 03/30/21   XR CHEST PORT    Narrative CHEST AP PORTABLE    Indication: Shortness of breath. Comparison: X-ray 12/18/2020 and 07/25/2020. Findings: The lungs appear clear. The cardiac silhouette and pulmonary  vascularity appear within normal limits. No evidence for pneumothorax or pleural  effusion. Impression No acute cardiopulmonary disease. PFTs:  I have reviewed the patient's PFTs -- no new studies    TTE:  I have reviewed the patient's TTE results  05/21/20   ECHO ADULT COMPLETE 05/25/2020 5/25/2020    Narrative · Image quality for this study was technically difficult. Contrast used:   DEFINITY. · Normal cavity size and systolic function (ejection fraction normal). Moderate concentric hypertrophy. Estimated left ventricular ejection   fraction is 65 - 70%. Visually measured ejection fraction. No regional   wall motion abnormality noted. Moderate (grade 2) left ventricular   diastolic dysfunction. · Mildly dilated left atrium. · Pulmonary hypertension. Pulmonary arterial systolic pressure is 61 mmHg. · Severely elevated central venous pressure (15+ mmHg); IVC diameter is   larger than 21 mm and collapses less than 50% with respiration. Signed by: Darryle Johns, MD   Cardiac catheter result reviewed from 12/21/2020 from Dr. Keily Estes, reports that patient has elevated LVEDP (27) consistent with fluid overload/CHF, normal LV function, no wall motion abnormalities, moderate two-vessel coronary artery disease to treat medically. NIPPV/trilogy compliance report reviewed in clinic: Patient has good compliance      Assessment and Plan:  63 y.o. female with:    Impression:  1. COPD/asthma overlap syndrome: No hospitalizations in the interval.  Patient remains on chronic prednisone and benralizumab  2. Underlying somewhat controlled severe persistent asthma with steroid dependence: Patient has a strong asthma component with significant bronchospasm to a wide variety of classic environmental triggers, was on SCIT therapy in the past.  Despite chronic steroids, patient's absolute eosinophil count was 100 on 6/3/2020 and 200 on 3/24/2020. Patient on benralizumab injections, and notes significant improvement. Pt also on dual ICS/LABA/LAMA/LTRA therapy  3. COPD, gold risk category D  4. Chronic hypoxic and hypercapnic respiratory failure  5. Morbid obesity: Body mass index is 46.38 kg/m². 6.  Obesity hypoventilation syndrome with SHERRIE: Patient on trilogy in the AVAPS-AE setting with good compliance  7. Dyspnea/shortness of breath: Multifactorial, due to above as well as CHFpEF  8. Chronic rhinitis  9. Chronic steroid dependence: Noted above  10. CHFpEF with recent exacerbation: Seen on transthoracic echo, grade 2 diastolic dysfunction and moderate concentric hypertrophy with dilated LA and severe pulmonary hypertension  11.   Pulm HTN: Secondary to WHO group 2 and group 3 disease. Elevated LVEDP on cardiac cath from 12/21/20  12. History of tobacco use: Quit smoking in 2006  13. Osteoporosis: Seen on most recent DEXA scan from 11/2/2020. Secondary to chronic steroid use. Pt on Prolia injections by PCP  14. Glaucoma and cataracts: Follows with ophthalmology, likely exacerbated by chronic steroid use        Plan:  -Restart benralizumab injections, patient received maintenance injection this morning, continue every 8 weeks as scheduled  -Wean steroids in about 2-3 weeks (after fasenra injection today) -- to 2.5mg daily. Advised patient to continue this dose until seen in next visit where we will wean further  -Follow-up with cardiology with regards to CHF and fluid status. Counseled patient with regards to CHF lifestyle precautions including fluid restriction, daily weights, low-sodium diet, etc.  -Last CT lung cancer screening scheduled for 11/3/2021. This will complete 15 years of surveillance post cessation  -Management of osteoporosis per PCP  -Continue chronic azithromycin therapy 250 mg p.o. every Monday/Wednesday/Friday  -Continue dual ICS/LABA/LAMA/LTRA therapy   Continue Advair /21 MCG 2 puffs twice daily. Counseled patient rinse mouth thoroughly after use   Continue Flovent  mcg 1 puff twice daily. Counseled patient to rinse mouth well after each use   Continue Spiriva Respimat 2 puffs daily   Continue Singulair 10 mg nightly   Continue DuoNebs as needed   Continue Albuterol HFA 1-2puffs q4-6h PRN. Counseled patient that this is their rescue inhaler. Also counseled patient regarding premedication 15-30m prior to exercise or exposure to very cold air  -Counseled patient on proper inhaler technique  -Counseled patient to avoid potential triggers of asthma  -Counseled regarding weight loss  -Counseled regarding deleterious health effects of chronic steroid therapy.   Counseled patient to follow-up with ophthalmology as scheduled. Continue Prolia injections by primary care  -Continue Claritin daily  -Continue trilogy nightly and PRN during daytime, congratulated patient on good compliance. Counseled patient to change mask/tubing/filters every 3 to 6 months. Counseled patient against sleepy driving.  -Continue supplemental oxygen 2 L with exertion and blended into AVAPS nightly  -Weight loss  -Refer patient to pulmonary rehab to begin graded exercise  -Immunizations reviewed, influenza and pneumococcal vaccinations up-to-date  -Counseled patient regarding lifestyle precautions in COVID-19 pandemic including wearing mask in public and confined spaces, social/physical distancing, frequent hand hygiene, etc.  Antonia Points pt to receive COVID-19 vaccination when possible       Follow-up and Dispositions    · Return in about 3 months (around 7/27/2021). Orders Placed This Encounter    REFERRAL TO PULMONARY REHAB    azithromycin (ZITHROMAX) 250 mg tablet    tiotropium bromide (Spiriva Respimat) 2.5 mcg/actuation inhaler    fluticasone propion-salmeteroL (ADVAIR HFA) 230-21 mcg/actuation inhaler    fluticasone propionate (FLOVENT HFA) 220 mcg/actuation inhaler     This patient has a high complexity chronic care condition   This Visit needed High complexity medically necessary decision making and management plans.       Odilia Cannon MD/MPH     Pulmonary, Critical Care Medicine  [facility]

## 2021-04-28 ENCOUNTER — TELEPHONE (OUTPATIENT)
Dept: PULMONOLOGY | Age: 61
End: 2021-04-28

## 2021-04-28 DIAGNOSIS — Z20.822 CLOSE EXPOSURE TO COVID-19 VIRUS: Primary | ICD-10-CM

## 2021-04-28 NOTE — TELEPHONE ENCOUNTER
Pt [facility]([phone]). Pt was in yesterday 4/27/21 with Dr Chin Pandey and found out today that her sister tested positive for covid. She wants to know where to go to get tested. Please call her back.

## 2021-04-28 NOTE — TELEPHONE ENCOUNTER
Patient sister called today to advise her she tested positive for Covid. Pt and sister eat together all the time and she does not wear mask around her sister. Will ask Dr. Uli Eldridge if an order can be placed for the test. Pt seen yesterday in clinic. Pt will need to quarantine even if negative.

## 2021-04-28 NOTE — TELEPHONE ENCOUNTER
Per verbal order from Dr. Carly Faye. He will put a order in for the Covid test so if the patient wants to get tested she can. Even if negative she still needs to quarantine for 14 days. Patient also advised in negative today doesn't mean she will not be positive within the 14 days of quarantine so very important to stay quarantined. Pt verbalizes understanding.  She states she started with a HA since she called so she is going to get tested tomorrow

## 2021-04-29 ENCOUNTER — HOSPITAL ENCOUNTER (EMERGENCY)
Age: 61
Discharge: HOME | End: 2021-04-30
Attending: STUDENT IN AN ORGANIZED HEALTH CARE EDUCATION/TRAINING PROGRAM
Payer: MEDICARE

## 2021-04-29 ENCOUNTER — APPOINTMENT (OUTPATIENT)
Dept: GENERAL RADIOLOGY | Age: 61
End: 2021-04-29
Attending: PHYSICIAN ASSISTANT
Payer: MEDICARE

## 2021-04-29 DIAGNOSIS — K59.00 CONSTIPATION, UNSPECIFIED CONSTIPATION TYPE: ICD-10-CM

## 2021-04-29 DIAGNOSIS — R10.9 ABDOMINAL PAIN, UNSPECIFIED ABDOMINAL LOCATION: ICD-10-CM

## 2021-04-29 DIAGNOSIS — Z20.822 SUSPECTED COVID-19 VIRUS INFECTION: ICD-10-CM

## 2021-04-29 DIAGNOSIS — J44.1 COPD EXACERBATION (HCC): Primary | ICD-10-CM

## 2021-04-29 LAB
ALBUMIN SERPL-MCNC: 3.7 G/DL (ref 3.4–5)
ALBUMIN/GLOB SERPL: 1.1 {RATIO} (ref 0.8–1.7)
ALP SERPL-CCNC: 110 U/L (ref 45–117)
ALT SERPL-CCNC: 38 U/L (ref 13–56)
ANION GAP SERPL CALC-SCNC: 4 MMOL/L (ref 3–18)
AST SERPL-CCNC: 28 U/L (ref 10–38)
BASOPHILS # BLD: 0 K/UL (ref 0–0.1)
BASOPHILS NFR BLD: 0 % (ref 0–2)
BILIRUB SERPL-MCNC: 0.6 MG/DL (ref 0.2–1)
BUN SERPL-MCNC: 10 MG/DL (ref 7–18)
BUN/CREAT SERPL: 12 (ref 12–20)
CALCIUM SERPL-MCNC: 9.7 MG/DL (ref 8.5–10.1)
CHLORIDE SERPL-SCNC: 105 MMOL/L (ref 100–111)
CK MB CFR SERPL CALC: 0.9 % (ref 0–4)
CK MB SERPL-MCNC: 1 NG/ML (ref 5–25)
CK SERPL-CCNC: 107 U/L (ref 26–192)
CO2 SERPL-SCNC: 30 MMOL/L (ref 21–32)
CREAT SERPL-MCNC: 0.84 MG/DL (ref 0.6–1.3)
DIFFERENTIAL METHOD BLD: ABNORMAL
EOSINOPHIL # BLD: 0.1 K/UL (ref 0–0.4)
EOSINOPHIL NFR BLD: 2 % (ref 0–5)
ERYTHROCYTE [DISTWIDTH] IN BLOOD BY AUTOMATED COUNT: 13.2 % (ref 11.6–14.5)
GLOBULIN SER CALC-MCNC: 3.5 G/DL (ref 2–4)
GLUCOSE SERPL-MCNC: 125 MG/DL (ref 74–99)
HCT VFR BLD AUTO: 37.4 % (ref 35–45)
HGB BLD-MCNC: 12.5 G/DL (ref 12–16)
LYMPHOCYTES # BLD: 1.6 K/UL (ref 0.9–3.6)
LYMPHOCYTES NFR BLD: 28 % (ref 21–52)
MAGNESIUM SERPL-MCNC: 1.8 MG/DL (ref 1.6–2.6)
MCH RBC QN AUTO: 28.5 PG (ref 24–34)
MCHC RBC AUTO-ENTMCNC: 33.4 G/DL (ref 31–37)
MCV RBC AUTO: 85.2 FL (ref 74–97)
MONOCYTES # BLD: 0.4 K/UL (ref 0.05–1.2)
MONOCYTES NFR BLD: 7 % (ref 3–10)
NEUTS SEG # BLD: 3.6 K/UL (ref 1.8–8)
NEUTS SEG NFR BLD: 63 % (ref 40–73)
PLATELET # BLD AUTO: 314 K/UL (ref 135–420)
PMV BLD AUTO: 8.8 FL (ref 9.2–11.8)
POTASSIUM SERPL-SCNC: 4.4 MMOL/L (ref 3.5–5.5)
PROT SERPL-MCNC: 7.2 G/DL (ref 6.4–8.2)
RBC # BLD AUTO: 4.39 M/UL (ref 4.2–5.3)
SARS-COV-2, COV2: NORMAL
SODIUM SERPL-SCNC: 139 MMOL/L (ref 136–145)
TROPONIN I SERPL-MCNC: <0.02 NG/ML (ref 0–0.04)
WBC # BLD AUTO: 5.8 K/UL (ref 4.6–13.2)

## 2021-04-29 PROCEDURE — 80053 COMPREHEN METABOLIC PANEL: CPT

## 2021-04-29 PROCEDURE — 96365 THER/PROPH/DIAG IV INF INIT: CPT

## 2021-04-29 PROCEDURE — 99285 EMERGENCY DEPT VISIT HI MDM: CPT

## 2021-04-29 PROCEDURE — 94640 AIRWAY INHALATION TREATMENT: CPT

## 2021-04-29 PROCEDURE — 74011000250 HC RX REV CODE- 250: Performed by: PHYSICIAN ASSISTANT

## 2021-04-29 PROCEDURE — 85025 COMPLETE CBC W/AUTO DIFF WBC: CPT

## 2021-04-29 PROCEDURE — 93005 ELECTROCARDIOGRAM TRACING: CPT

## 2021-04-29 PROCEDURE — 83735 ASSAY OF MAGNESIUM: CPT

## 2021-04-29 PROCEDURE — 96366 THER/PROPH/DIAG IV INF ADDON: CPT

## 2021-04-29 PROCEDURE — 74019 RADEX ABDOMEN 2 VIEWS: CPT

## 2021-04-29 PROCEDURE — U0003 INFECTIOUS AGENT DETECTION BY NUCLEIC ACID (DNA OR RNA); SEVERE ACUTE RESPIRATORY SYNDROME CORONAVIRUS 2 (SARS-COV-2) (CORONAVIRUS DISEASE [COVID-19]), AMPLIFIED PROBE TECHNIQUE, MAKING USE OF HIGH THROUGHPUT TECHNOLOGIES AS DESCRIBED BY CMS-2020-01-R: HCPCS

## 2021-04-29 PROCEDURE — 74011250636 HC RX REV CODE- 250/636: Performed by: PHYSICIAN ASSISTANT

## 2021-04-29 PROCEDURE — 96375 TX/PRO/DX INJ NEW DRUG ADDON: CPT

## 2021-04-29 PROCEDURE — 84484 ASSAY OF TROPONIN QUANT: CPT

## 2021-04-29 RX ORDER — MAGNESIUM SULFATE HEPTAHYDRATE 40 MG/ML
2 INJECTION, SOLUTION INTRAVENOUS ONCE
Status: COMPLETED | OUTPATIENT
Start: 2021-04-29 | End: 2021-04-30

## 2021-04-29 RX ORDER — FUROSEMIDE 10 MG/ML
40 INJECTION INTRAMUSCULAR; INTRAVENOUS ONCE
Status: COMPLETED | OUTPATIENT
Start: 2021-04-29 | End: 2021-04-29

## 2021-04-29 RX ORDER — ALBUTEROL SULFATE 0.83 MG/ML
5 SOLUTION RESPIRATORY (INHALATION)
Status: COMPLETED | OUTPATIENT
Start: 2021-04-29 | End: 2021-04-29

## 2021-04-29 RX ORDER — ONDANSETRON 2 MG/ML
4 INJECTION INTRAMUSCULAR; INTRAVENOUS
Status: COMPLETED | OUTPATIENT
Start: 2021-04-29 | End: 2021-04-29

## 2021-04-29 RX ORDER — IPRATROPIUM BROMIDE AND ALBUTEROL SULFATE 2.5; .5 MG/3ML; MG/3ML
3 SOLUTION RESPIRATORY (INHALATION)
Status: COMPLETED | OUTPATIENT
Start: 2021-04-29 | End: 2021-04-29

## 2021-04-29 RX ADMIN — ALBUTEROL SULFATE 5 MG: 2.5 SOLUTION RESPIRATORY (INHALATION) at 23:40

## 2021-04-29 RX ADMIN — METHYLPREDNISOLONE SODIUM SUCCINATE 125 MG: 125 INJECTION, POWDER, FOR SOLUTION INTRAMUSCULAR; INTRAVENOUS at 22:29

## 2021-04-29 RX ADMIN — FUROSEMIDE 40 MG: 10 INJECTION, SOLUTION INTRAMUSCULAR; INTRAVENOUS at 22:31

## 2021-04-29 RX ADMIN — ALBUTEROL SULFATE 5 MG: 2.5 SOLUTION RESPIRATORY (INHALATION) at 21:59

## 2021-04-29 RX ADMIN — MAGNESIUM SULFATE HEPTAHYDRATE 2 G: 40 INJECTION, SOLUTION INTRAVENOUS at 22:24

## 2021-04-29 RX ADMIN — ONDANSETRON 4 MG: 2 INJECTION INTRAMUSCULAR; INTRAVENOUS at 22:27

## 2021-04-29 RX ADMIN — IPRATROPIUM BROMIDE AND ALBUTEROL SULFATE 3 ML: .5; 3 SOLUTION RESPIRATORY (INHALATION) at 21:59

## 2021-04-29 NOTE — ED NOTES
I performed a brief history of the patient here in triage and I have determined that pt will need further treatment and evaluation from the main side ER physician or JESSICA. I have placed initial orders based on the history to help in expediting patients care.        Visit Vitals  BP (!) 144/83   Pulse 74   Temp 97.6 °F (36.4 °C)   Resp 20   Ht 5' 3\" (1.6 m)   Wt 118.4 kg (261 lb)   SpO2 100%   BMI 46.23 kg/m²      ANALI Landers 6:11 PM

## 2021-04-29 NOTE — ED PROVIDER NOTES
EMERGENCY DEPARTMENT HISTORY AND PHYSICAL EXAM    Date: 4/29/2021  Patient Name: Sky Rhodes    History of Presenting Illness     Chief Complaint   Patient presents with    Shortness of Breath    Chest Pain    Concern For COVID-19 (Coronavirus)    Constipation         History Provided By: Patient    Chief Complaint: Shortness of breath, positive Covid contact, chest pain, headache, abdominal pain, constipation  Duration: Abdominal pain has been intermittent for the past 3 weeks, shortness of breath for the past 5 days  Timing: Gradual  Location: Chest  Quality: Tight  Severity: Moderate to severe  Modifying Factors: Worse after being around her sister who recently tested positive for Covid  Associated Symptoms: none       Additional History (Context): Sky Rhodes is a 63 y.o. female with a history of asthma, COPD, CHF, umbilical hernia, hypertension and diabetes who presents today for issues listed above. Patient states that she has been around her sister frequently who recently tested positive for Covid and is concerned for Covid. Patient had Covid this past summer and states that her headache and shortness of breath feels somewhat similar to prior episode of Covid. Patient reports she has not had any of her daily medications to include her Lasix this morning because she did not feel well enough to take them. Patient reports she is typically on 2 L of oxygen at home and has not had to increase this. Patient also complaining of intermittent abdominal pain and some nausea for the past 3 weeks. States she has a known umbilical hernia that she is sees Dr. Steffen Stewart for who states she is not the best surgical candidate therefore it has not been fixed yet. Patient reports it is still reducible. Patient is also complaining of constipation, states she is passing plenty of gas and denies any vomiting however reports she has not had a bowel movement in roughly 7 days.   Denies history of bowel obstructions. Denies any measurable fevers, chills, sore throat, diarrhea, vomiting or urinary complaints. Reports her primary care doctor is [facility]. Patient also reports concerns that her shortness of breath may be due to recently missing a dose of her Biologics due to insurance concerns. States she did recently get it in the past couple days however reports it takes a couple days to start working. States that when she is on her biologic will regularly her COPD exacerbations are minimal.  Reports he has been admitted multiple times in the past for COPD, but denies any intubation. PCP: Darnell Yang MD    Current Outpatient Medications   Medication Sig Dispense Refill    polyethylene glycol (Miralax) 17 gram/dose powder Take 17 g by mouth daily for 31 days. 1 tablespoon with 8 oz of water daily 527 g 0    azithromycin (ZITHROMAX) 250 mg tablet Take 1 Tab by mouth every Monday, Wednesday, Friday. 30 Tab 3    tiotropium bromide (Spiriva Respimat) 2.5 mcg/actuation inhaler Take 2 Puffs by inhalation daily. 3 Inhaler 3    fluticasone propion-salmeteroL (ADVAIR HFA) 866-26 mcg/actuation inhaler Take 2 Puffs by inhalation two (2) times a day. 3 Each 3    fluticasone propionate (FLOVENT HFA) 220 mcg/actuation inhaler Take 1 Puff by inhalation two (2) times a day. Rinse and gargle after each use 3 Inhaler 3    predniSONE (DELTASONE) 5 mg tablet Take 5mg on alternating days with 2.5mg the other days 90 Tab 1    Prolia 60 mg/mL injection INJECT 60MG SUBCUTANEOUSLY AS A SINGLE DOSE ONCE EVERY 6 MONTHS      simethicone (GAS-X) 125 mg capsule Take 125 mg by mouth four (4) times daily as needed for Flatulence.  insulin glargine (LANTUS,BASAGLAR) 100 unit/mL (3 mL) inpn 50 Units by SubCUTAneous route nightly. Indications: type 2 diabetes mellitus      furosemide (LASIX) 40 mg tablet Take 1 Tab by mouth two (2) times a day.  60 Tab 6    albuterol-ipratropium (DUO-NEB) 2.5 mg-0.5 mg/3 ml nebu 3 mL by Nebulization route every six (6) hours as needed for Wheezing.  glucose blood VI test strips (FreeStyle Lite Strips) strip Use four times daily for blood sugar checks.  Blood-Glucose Meter (FreeStyle Freedom Lite) monitoring kit CHECK BLOOD SUGARS B.I.D. E11.9      fluticasone propionate (FLONASE) 50 mcg/actuation nasal spray 2 Sprays by Nasal route daily.  lancets (FreeStyle Lancets) 28 gauge misc Use four times daily for blood sugar checks      Insulin Needles, Disposable, (BD Ultra-Fine Mini Pen Needle) 31 gauge x 3/16\" ndle Use with Basaglar once daily.  Insulin Syringe-Needle U-100 0.3 mL 31 gauge x 5/16\" syrg USE AS DIRECTED      acetaminophen (TYLENOL) 500 mg tablet Take 500 mg by mouth every six (6) hours as needed for Fever or Pain.  albuterol (PROVENTIL HFA, VENTOLIN HFA, PROAIR HFA) 90 mcg/actuation inhaler Take 2 Puffs by inhalation every four (4) hours as needed for Wheezing. 1 Inhaler 0    omeprazole (PRILOSEC) 40 mg capsule Take 40 mg by mouth daily.  lisinopril (PRINIVIL, ZESTRIL) 20 mg tablet Take 20 mg by mouth daily.  NOVOLOG FLEXPEN U-100 INSULIN 100 unit/mL inpn Continue home Sliding scale insulin as prior to admission (Patient taking differently: 1 Units by SubCUTAneous route three (3) times daily. If BG <100=0u  101-150=5u  151-250=8u  251-300=12u  >300 call MD  ) 1 Pen 0    OXYGEN-AIR DELIVERY SYSTEMS 2 L by Nasal route continuous. First Choice between 2L and 3L      aspirin delayed-release 81 mg tablet Take 81 mg by mouth daily.  montelukast (SINGULAIR) 10 mg tablet Take 10 mg by mouth nightly.          Past History     Past Medical History:  Past Medical History:   Diagnosis Date    Asthma     Chronic lung disease     COPD     Cystocele, midline     Diabetes mellitus ([address]     GERD (gastroesophageal reflux disease)     Hidradenitis suppurativa     Hyperlipidemia     Hypertension     SHERRIE on CPAP     CPAP    Stress incontinence        Past Surgical History:  Past Surgical History:   Procedure Laterality Date    HX APPENDECTOMY      HX CATARACT REMOVAL Right     HX CHOLECYSTECTOMY      HX DILATION AND CURETTAGE      Dysfunctional uterine bleeding, thought 2/2 fibroids    HX HEART CATHETERIZATION  2020    HX TUBAL LIGATION      UT BREAST SURGERY PROCEDURE UNLISTED      Right breast benign tumor removal       Family History:  Family History   Problem Relation Age of Onset    Hypertension Mother     Stroke Mother     Breast Cancer Mother         Bilateral mastectomies    Cancer Mother         ovarian and breast    Heart Failure Mother     Ovarian Cancer Mother     Heart Attack Father         2011    Heart Surgery Father         CABG    Heart Failure Father     COPD Sister         Heavy smoker    Cancer Sister         ovarian    Heart Failure Sister     Lung Disease Sister     Asthma Child     Cancer Maternal Aunt         pancreatic    Cancer Maternal Grandfather         stomach       Social History:  Social History     Tobacco Use    Smoking status: Former Smoker     Packs/day: 1.00     Years: 30.00     Pack years: 30.00     Types: Cigarettes     Start date: 1966     Quit date: 2006     Years since quittin.6    Smokeless tobacco: Former User    Tobacco comment: 1-1.5 packs per day   Substance Use Topics    Alcohol use: No    Drug use: No       Allergies: Allergies   Allergen Reactions    Ancef [Cefazolin] Hives    Contrast Agent [Iodine] Anaphylaxis, Shortness of Breath and Swelling     Needs pre-medication for IV contrast with Benadryl, Solu-Medrol    Fish Containing Products Anaphylaxis     Pt states she had a severe allergic reaction at 9 y/o.  Statins-Hmg-Coa Reductase Inhibitors Myalgia    Metformin Other (comments)     Abdominal pain, diarrhea.     Codeine Other (comments)     Altered mental status         Review of Systems   Review of Systems   Constitutional: Negative for chills and fever. HENT: Negative for congestion, rhinorrhea and sore throat. Respiratory: Positive for shortness of breath and wheezing. Negative for cough. Cardiovascular: Positive for chest pain. Negative for palpitations and leg swelling. Gastrointestinal: Positive for abdominal pain, constipation and nausea. Negative for blood in stool, diarrhea and vomiting. Genitourinary: Negative for dysuria, frequency and hematuria. Musculoskeletal: Negative for back pain and myalgias. Skin: Negative for rash and wound. Neurological: Positive for headaches. Negative for dizziness. All other systems reviewed and are negative. All Other Systems Negative  Physical Exam     Vitals:    04/29/21 1738 04/29/21 2200   BP: (!) 144/83 (!) 154/83   Pulse: 74 83   Resp: 20 25   Temp: 97.6 °F (36.4 °C)    SpO2: 100% 100%   Weight: 118.4 kg (261 lb)    Height: 5' 3\" (1.6 m)      Physical Exam  Vitals signs and nursing note reviewed. Constitutional:       General: She is not in acute distress. Appearance: She is well-developed. She is not diaphoretic. Comments: Chronically ill-appearing   HENT:      Head: Normocephalic and atraumatic. Eyes:      Conjunctiva/sclera: Conjunctivae normal.   Neck:      Musculoskeletal: Normal range of motion and neck supple. Cardiovascular:      Rate and Rhythm: Normal rate and regular rhythm. Heart sounds: Normal heart sounds. Pulmonary:      Effort: Pulmonary effort is normal. No respiratory distress. Breath sounds: Decreased air movement present. No stridor or transmitted upper airway sounds. Decreased breath sounds and wheezing present. Comments: Speaking in short sentences, 100% on her normal 2 L at home  Chest:      Chest wall: No tenderness. Abdominal:      General: Bowel sounds are normal. There is no distension. Palpations: Abdomen is soft. Tenderness: There is no abdominal tenderness. There is no guarding or rebound. Hernia: A hernia is present. Hernia is present in the umbilical area. Comments: Reducible umbilical hernia, no tenderness on exam   Musculoskeletal:         General: No deformity. Skin:     General: Skin is warm and dry. Neurological:      Mental Status: She is alert and oriented to person, place, and time. Deep Tendon Reflexes: Reflexes are normal and symmetric. Diagnostic Study Results     Labs -     Recent Results (from the past 12 hour(s))   EKG, 12 LEAD, INITIAL    Collection Time: 04/29/21  5:48 PM   Result Value Ref Range    Ventricular Rate 76 BPM    Atrial Rate 76 BPM    P-R Interval 146 ms    QRS Duration 122 ms    Q-T Interval 426 ms    QTC Calculation (Bezet) 479 ms    Calculated P Axis 78 degrees    Calculated R Axis 61 degrees    Calculated T Axis 18 degrees    Diagnosis       Poor data quality, interpretation may be adversely affected  Normal sinus rhythm  Right bundle branch block  Abnormal ECG  When compared with ECG of 30-MAR-2021 15:30,  No significant change was found     CBC WITH AUTOMATED DIFF    Collection Time: 04/29/21  5:59 PM   Result Value Ref Range    WBC 5.8 4.6 - 13.2 K/uL    RBC 4.39 4.20 - 5.30 M/uL    HGB 12.5 12.0 - 16.0 g/dL    HCT 37.4 35.0 - 45.0 %    MCV 85.2 74.0 - 97.0 FL    MCH 28.5 24.0 - 34.0 PG    MCHC 33.4 31.0 - 37.0 g/dL    RDW 13.2 11.6 - 14.5 %    PLATELET 412 205 - 717 K/uL    MPV 8.8 (L) 9.2 - 11.8 FL    NEUTROPHILS 63 40 - 73 %    LYMPHOCYTES 28 21 - 52 %    MONOCYTES 7 3 - 10 %    EOSINOPHILS 2 0 - 5 %    BASOPHILS 0 0 - 2 %    ABS. NEUTROPHILS 3.6 1.8 - 8.0 K/UL    ABS. LYMPHOCYTES 1.6 0.9 - 3.6 K/UL    ABS. MONOCYTES 0.4 0.05 - 1.2 K/UL    ABS. EOSINOPHILS 0.1 0.0 - 0.4 K/UL    ABS.  BASOPHILS 0.0 0.0 - 0.1 K/UL    DF AUTOMATED     METABOLIC PANEL, COMPREHENSIVE    Collection Time: 04/29/21  5:59 PM   Result Value Ref Range    Sodium 139 136 - 145 mmol/L    Potassium 4.4 3.5 - 5.5 mmol/L    Chloride 105 100 - 111 mmol/L    CO2 30 21 - 32 mmol/L    Anion gap 4 3.0 - 18 mmol/L    Glucose 125 (H) 74 - 99 mg/dL    BUN 10 7.0 - 18 MG/DL    Creatinine 0.84 0.6 - 1.3 MG/DL    BUN/Creatinine ratio 12 12 - 20      GFR est AA >60 >60 ml/min/1.73m2    GFR est non-AA >60 >60 ml/min/1.73m2    Calcium 9.7 8.5 - 10.1 MG/DL    Bilirubin, total 0.6 0.2 - 1.0 MG/DL    ALT (SGPT) 38 13 - 56 U/L    AST (SGOT) 28 10 - 38 U/L    Alk. phosphatase 110 45 - 117 U/L    Protein, total 7.2 6.4 - 8.2 g/dL    Albumin 3.7 3.4 - 5.0 g/dL    Globulin 3.5 2.0 - 4.0 g/dL    A-G Ratio 1.1 0.8 - 1.7     CARDIAC PANEL,(CK, CKMB & TROPONIN)    Collection Time: 04/29/21  5:59 PM   Result Value Ref Range    CK - MB 1.0 <3.6 ng/ml    CK-MB Index 0.9 0.0 - 4.0 %     26 - 192 U/L    Troponin-I, QT <0.02 0.0 - 0.045 NG/ML   MAGNESIUM    Collection Time: 04/29/21  5:59 PM   Result Value Ref Range    Magnesium 1.8 1.6 - 2.6 mg/dL   SARS-COV-2    Collection Time: 04/29/21 11:16 PM   Result Value Ref Range    SARS-CoV-2 Please find results under separate order         Radiologic Studies -   XR ABD (AP AND ERECT OR DECUB)   Final Result      Nonspecific nonobstructive bowel gas pattern. CT ABD PELV WO CONT    (Results Pending)     CT Results  (Last 48 hours)    None        CXR Results  (Last 48 hours)    None            Medical Decision Making   I am the first provider for this patient. I reviewed the vital signs, available nursing notes, past medical history, past surgical history, family history and social history. Vital Signs-Reviewed the patient's vital signs.       Records Reviewed: Nursing Notes and Old Medical Records     Procedures: None   Procedures    Provider Notes (Medical Decision Making):     Differential: Pneumonia, CHF exacerbation, COPD exacerbation, medication noncompliance, Covid, ACS, arrhythmia, hernia, bowel obstruction, IBS/IBD      Plan: Patient's lung sounds are significantly decreased, have discussed this with Caleb Holland, charge nurse and have requested patient be roomed immediately. Have ordered DuoNebs, albuterol, mag and Solu-Medrol. Will also swab for Covid. We will also order cardiac work-up and Zofran. Will order KUB. We will also plan to diurese patient. 10:07 PM  Have discussed work-up results thus far. Patient is just now receiving her DuoNeb. We will continue to monitor and reassess. Pending lasix at this time. 11:40 PM  Patient reports she is feeling much better at this time however is now complaining of wheezing. Lung sounds have greatly improved, will order addition albuterol and continue to monitor. Patient has also diuresed roughly 500 cc.     1:34 AM  Offered to  attempt a disimpaction however patient states she feels like \"it is higher up\". States she tried a fleets enema at home and when she removed the enema there was no stool. Will order CT without IV contrast with oral barium contrast.    1:50 AM  X-ray was negative for bowel obstruction. We will plan to discharge home with MiraLAX and have advised mag citrate as needed. Have advised close PCP follow-up. Patient reports she feels much better and is breathing without any difficulties. Have encouraged medication compliance. MED RECONCILIATION:  No current facility-administered medications for this encounter. Current Outpatient Medications   Medication Sig    polyethylene glycol (Miralax) 17 gram/dose powder Take 17 g by mouth daily for 31 days. 1 tablespoon with 8 oz of water daily    azithromycin (ZITHROMAX) 250 mg tablet Take 1 Tab by mouth every Monday, Wednesday, Friday.  tiotropium bromide (Spiriva Respimat) 2.5 mcg/actuation inhaler Take 2 Puffs by inhalation daily.  fluticasone propion-salmeteroL (ADVAIR HFA) 230-21 mcg/actuation inhaler Take 2 Puffs by inhalation two (2) times a day.  fluticasone propionate (FLOVENT HFA) 220 mcg/actuation inhaler Take 1 Puff by inhalation two (2) times a day.  Rinse and gargle after each use    predniSONE (DELTASONE) 5 mg tablet Take 5mg on alternating days with 2.5mg the other days    Prolia 60 mg/mL injection INJECT 60MG SUBCUTANEOUSLY AS A SINGLE DOSE ONCE EVERY 6 MONTHS    simethicone (GAS-X) 125 mg capsule Take 125 mg by mouth four (4) times daily as needed for Flatulence.  insulin glargine (LANTUS,BASAGLAR) 100 unit/mL (3 mL) inpn 50 Units by SubCUTAneous route nightly. Indications: type 2 diabetes mellitus    furosemide (LASIX) 40 mg tablet Take 1 Tab by mouth two (2) times a day.  albuterol-ipratropium (DUO-NEB) 2.5 mg-0.5 mg/3 ml nebu 3 mL by Nebulization route every six (6) hours as needed for Wheezing.  glucose blood VI test strips (FreeStyle Lite Strips) strip Use four times daily for blood sugar checks.  Blood-Glucose Meter (FreeStyle Freedom Lite) monitoring kit CHECK BLOOD SUGARS B.I.D. E11.9    fluticasone propionate (FLONASE) 50 mcg/actuation nasal spray 2 Sprays by Nasal route daily.  lancets (FreeStyle Lancets) 28 gauge misc Use four times daily for blood sugar checks    Insulin Needles, Disposable, (BD Ultra-Fine Mini Pen Needle) 31 gauge x 3/16\" ndle Use with Basaglar once daily.  Insulin Syringe-Needle U-100 0.3 mL 31 gauge x 5/16\" syrg USE AS DIRECTED    acetaminophen (TYLENOL) 500 mg tablet Take 500 mg by mouth every six (6) hours as needed for Fever or Pain.  albuterol (PROVENTIL HFA, VENTOLIN HFA, PROAIR HFA) 90 mcg/actuation inhaler Take 2 Puffs by inhalation every four (4) hours as needed for Wheezing.  omeprazole (PRILOSEC) 40 mg capsule Take 40 mg by mouth daily.  lisinopril (PRINIVIL, ZESTRIL) 20 mg tablet Take 20 mg by mouth daily.  NOVOLOG FLEXPEN U-100 INSULIN 100 unit/mL inpn Continue home Sliding scale insulin as prior to admission (Patient taking differently: 1 Units by SubCUTAneous route three (3) times daily. If BG <100=0u  101-150=5u  151-250=8u  251-300=12u  >300 call MD  )    OXYGEN-AIR DELIVERY SYSTEMS 2 L by Nasal route continuous.  First Choice between 2L and 3L    aspirin delayed-release 81 mg tablet Take 81 mg by mouth daily.  montelukast (SINGULAIR) 10 mg tablet Take 10 mg by mouth nightly. Disposition:  Home     DISCHARGE NOTE:   Pt has been reexamined. Patient has no new complaints, changes, or physical findings. Care plan outlined and precautions discussed. Results of workup were reviewed with the patient. All medications were reviewed with the patient. All of pt's questions and concerns were addressed. Patient was instructed and agrees to follow up with PCP/pulmonology as well as to return to the ED upon further deterioration. Patient is ready to go home. Follow-up Information     Follow up With Specialties Details Why Contact Info    [facility] EMERGENCY DEPT Emergency Medicine  As needed [address] [facility]    Kady Vences MD Family Medicine Schedule an appointment as soon as possible for a visit   [address]  [phone]            Current Discharge Medication List      START taking these medications    Details   polyethylene glycol (Miralax) 17 gram/dose powder Take 17 g by mouth daily for 31 days. 1 tablespoon with 8 oz of water daily  Qty: 527 g, Refills: 0                 Diagnosis     Clinical Impression:   1. COPD exacerbation ([address]    2. Constipation, unspecified constipation type    3. Abdominal pain, unspecified abdominal location    4. Suspected COVID-19 virus infection          \"Please note that this dictation was completed with ALOHA, the computer voice recognition software. Quite often unanticipated grammatical, syntax, homophones, and other interpretive errors are inadvertently transcribed by the computer software. Please disregard these errors. Please excuse any errors that have escaped final proofreading. \"

## 2021-04-29 NOTE — ED TRIAGE NOTES
Pt reports headache and shortness of breath since Tuesday. Pt also reports chest pains that increase with deep breathing. Pt states her sister is positive for covid and she was exposured to her over the weekend.

## 2021-04-30 ENCOUNTER — APPOINTMENT (OUTPATIENT)
Dept: CT IMAGING | Age: 61
End: 2021-04-30
Attending: PHYSICIAN ASSISTANT
Payer: MEDICARE

## 2021-04-30 VITALS
HEIGHT: 63 IN | TEMPERATURE: 97.6 F | OXYGEN SATURATION: 98 % | WEIGHT: 261 LBS | BODY MASS INDEX: 46.25 KG/M2 | HEART RATE: 80 BPM | RESPIRATION RATE: 25 BRPM | DIASTOLIC BLOOD PRESSURE: 63 MMHG | SYSTOLIC BLOOD PRESSURE: 141 MMHG

## 2021-04-30 LAB
ATRIAL RATE: 76 BPM
CALCULATED P AXIS, ECG09: 78 DEGREES
CALCULATED R AXIS, ECG10: 61 DEGREES
CALCULATED T AXIS, ECG11: 18 DEGREES
DIAGNOSIS, 93000: NORMAL
P-R INTERVAL, ECG05: 146 MS
Q-T INTERVAL, ECG07: 426 MS
QRS DURATION, ECG06: 122 MS
QTC CALCULATION (BEZET), ECG08: 479 MS
VENTRICULAR RATE, ECG03: 76 BPM

## 2021-04-30 RX ORDER — POLYETHYLENE GLYCOL 3350 17 G/17G
17 POWDER, FOR SOLUTION ORAL DAILY
Qty: 527 G | Refills: 0 | Status: SHIPPED | OUTPATIENT
Start: 2021-04-30 | End: 2021-05-31

## 2021-04-30 NOTE — ED NOTES
Patient continues to rest comfortably on stretcher. Patient states she's feeling better. Patient to be discharged shortly.

## 2021-05-01 ENCOUNTER — PATIENT OUTREACH (OUTPATIENT)
Dept: CASE MANAGEMENT | Age: 61
End: 2021-05-01

## 2021-05-01 LAB — SARS-COV-2, COV2NT: NOT DETECTED

## 2021-05-01 NOTE — PROGRESS NOTES
Patient contacted regarding COVID-19 exposure. Discussed COVID-19 related testing which was pending at this time. Test results were pending. Patient informed of results, if available? n/a     Care Transition Nurse contacted the patient by telephone to perform post discharge assessment. Call within 2 business days of discharge: Yes Verified name and  with patient as identifiers. Provided introduction to self, and explanation of the CTN/ACM role, and reason for call due to risk factors for infection and/or exposure to COVID-19. Symptoms reviewed with patient who verbalized the following symptoms: fatigue, shortness of breath, dizziness/lightheadedness and no new symptoms. Patient wears 2L O2 at baseline and reports SOB is unchanged since yesterday. Reviewed when to return to the ED vs call a provider. She is interested in obtaining a pulse ox and will look into this. Patient also c/o constipation and has taken Miralax with no results. She will obtain magnesium citrate and speak with her pharmacist to see if they recommend anything else. Encouraged her to contact an on-call provider in her PCP office to discuss concerns. Due to no improvement of shortness of breath encounter was routed to pulmonary provider for escalation. Discussed follow-up appointments. If no appointment was previously scheduled, appointment scheduling offered:  No, patient will call Monday to schedule an appt with pulmonologist Dr. Randi Morris this week. Advised patient to call the on-call pulmonary provider today to discuss recent increased SOB, as it has been ongoing for a few days and patient has a history of repeat hospital admissions for COPD/asthma. She verbalized a plan to call today to see if they want to give her an increased burst of prednisone.   Lara Rebollar Dr follow up appointment(s):   Future Appointments   Date Time Provider Lisa Mixon   2021 10:00 AM [facility] FAST TRACK NURSE [facility]   2021 12:15 PM Florian Dawkins MD [facility] AMB   11/3/2021  9:30 AM [facility]     Non-BS follow up appointment(s): n/a     Advance Care Planning:   Does patient have an Advance Directive:  not on file. Patient has following risk factors of: heart failure, COPD, diabetes and obesity, chronic O2 needs. CTN reviewed discharge instructions, medical action plan and red flags such as increased shortness of breath, increasing fever and signs of decompensation with patient who verbalized understanding. Discussed exposure protocols and quarantine with CDC Guidelines What to do if you are sick with coronavirus disease 2019.  Patient was given an opportunity for questions and concerns. The patient agrees to contact the [facility] exposure line [phone], [facility]  [phone] and PCP office for questions related to their healthcare. CTN provided contact information for future needs. Reviewed and educated patient on any new and changed medications related to discharge diagnosis     Was patient discharged with a pulse oximeter? no Discussed and confirmed pulse oximeter discharge instructions and when to notify provider or seek emergency care. Encouraged patient to obtain a pulse ox and she agreed to pursue this. Plan for follow-up call in 5-7 days based on severity of symptoms and risk factors.

## 2021-05-08 ENCOUNTER — PATIENT OUTREACH (OUTPATIENT)
Dept: CASE MANAGEMENT | Age: 61
End: 2021-05-08

## 2021-05-08 NOTE — PROGRESS NOTES
Patient contacted regarding COVID-19 risk and screening. Discussed COVID-19 related testing which was available at this time. Test results were negative. Patient informed of results, if available? yes     Care Transition Nurse contacted the patient by telephone to perform follow-up assessment. Verified name and  with patient as identifiers. Patient has following risk factors of: COPD, diabetes and obesity. Symptoms reviewed with patient who verbalized the following symptoms: fatigue, shortness of breath and dizziness/lightheadedness. Patient's main complaint is ongoing constipation. She took 2 bottles of magnesium citrate and daily Miralax with no relief from constipation. She has had some liquid stools, but continues to feel like something is stuck in her upper abdomen, causing her the inability to take a deep breath. She continues to have abdominal pain and swelling, but denies any edema. She is also eating a high fiber diet and drinking plenty of fluids. Was patient discharged with a pulse oximeter? no      Due to no improvement of constipation symptoms patient was advised to contact the on-call primary care provider and discuss concerns and return to the ED if recommended. She is not interested in going to urgent care because she wants a more extensive work up since her XR was negative for bowel obstruction at her last visit. Patient agrees to contact PCP today. Education provided regarding infection prevention, and signs and symptoms of COVID-19 and when to seek medical attention with patient who verbalized understanding. Discussed exposure protocols and quarantine from [address] you at higher risk for severe illness  and given an opportunity for questions and concerns. The patient agrees to contact the COVID-19 hotline [phone] or PCP office for questions related to their healthcare. CTN provided contact information for future reference.     From CDC: Are you at higher risk for severe illness?  Wash your hands often.  Avoid close contact (6 feet, which is about two arm lengths) with people who are sick.  Put distance between yourself and other people if COVID-19 is spreading in your community.  Clean and disinfect frequently touched surfaces.  Avoid all cruise travel and non-essential air travel.  Call your healthcare professional if you have concerns about COVID-19 and your underlying condition or if you are sick. For more information on steps you can take to protect yourself, see CDC's How to [place] for follow-up call in 5-7 days based on severity of symptoms and risk factors.

## 2021-05-11 ENCOUNTER — TELEPHONE (OUTPATIENT)
Dept: PULMONOLOGY | Age: 61
End: 2021-05-11

## 2021-05-11 NOTE — TELEPHONE ENCOUNTER
Pulmonary Rehab called patient and left a message about the program. Additional attempts at contact will be made.     Thank you,  Minerva Cesar

## 2021-05-13 ENCOUNTER — TELEPHONE (OUTPATIENT)
Dept: PULMONOLOGY | Age: 61
End: 2021-05-13

## 2021-05-13 NOTE — TELEPHONE ENCOUNTER
Pulmonary Rehab received a call from patient's daughter, Esther Oconnor. She gave a more appropriate number to reach Ms. Greer Sy.  I called that number and left a message about the program.    Thank you,  Karley Burns

## 2021-05-22 ENCOUNTER — PATIENT OUTREACH (OUTPATIENT)
Dept: CASE MANAGEMENT | Age: 61
End: 2021-05-22

## 2021-05-23 NOTE — PROGRESS NOTES
Patient resolved from [address] Transitions episode on 5/22/2021. CTN was unsuccessful at contacting this patient on 5/22 & 5/23. LVMMs requesting a return call. Patient/family was provided the following resources and education related to COVID-19 during the initial call:                         Signs, symptoms and red flags related to COVID-19            CDC exposure and quarantine guidelines            [facility] exposure contact - [phone]            Contact for their local Department of Health                 Patient has not had any additional ED or hospital visits. No further outreach scheduled with this CTN. Episode of Care resolved. Patient has this CTN contact information if future needs arise.

## 2021-06-06 DIAGNOSIS — J45.51 SEVERE PERSISTENT ASTHMA WITH EXACERBATION: ICD-10-CM

## 2021-06-10 ENCOUNTER — APPOINTMENT (OUTPATIENT)
Dept: INFUSION THERAPY | Age: 61
End: 2021-06-10
Payer: MEDICARE

## 2021-06-22 ENCOUNTER — HOSPITAL ENCOUNTER (OUTPATIENT)
Dept: INFUSION THERAPY | Age: 61
Discharge: HOME | End: 2021-06-22
Payer: MEDICARE

## 2021-06-22 VITALS
RESPIRATION RATE: 24 BRPM | DIASTOLIC BLOOD PRESSURE: 89 MMHG | OXYGEN SATURATION: 95 % | TEMPERATURE: 97 F | HEART RATE: 79 BPM | SYSTOLIC BLOOD PRESSURE: 165 MMHG

## 2021-06-22 DIAGNOSIS — J45.51 SEVERE PERSISTENT ASTHMA WITH EXACERBATION: Primary | ICD-10-CM

## 2021-06-22 PROCEDURE — 96372 THER/PROPH/DIAG INJ SC/IM: CPT

## 2021-06-22 PROCEDURE — 74011250636 HC RX REV CODE- 250/636: Performed by: INTERNAL MEDICINE

## 2021-06-22 RX ADMIN — BENRALIZUMAB 30 MG: 30 INJECTION, SOLUTION SUBCUTANEOUS at 10:22

## 2021-06-22 NOTE — PROGRESS NOTES
[facility] Progress Note    Date: 2021    Name: Rupa Chávez    MRN: [id number]         : 1960    Ms. Greer Sy arrived in the [facility] today at 1010, in stable condition, here for her FASENRA Injection (Every 8 Weeks). She was assessed and education was provided. Ms. Hazel Villarreal vitals were reviewed. Visit Vitals  BP (!) 165/89 (BP 1 Location: Right upper arm, BP Patient Position: Sitting)   Pulse 79   Temp 97 °F (36.1 °C)   Resp 24   SpO2 95%   Breastfeeding No       .  Benralizumab (FASENRA) 30 mg, was administered SQ, in the back of her left arm at 1022, per order and without incident. Ms. Greer Sy tolerated well and voiced no complaints. Ms. Greer Sy was discharged from [place] [address] in stable condition at 1025. She is to return in 8 weeks, on 21 at 1000, for her next appointment, for her next [facility] Injection.      Claudia Copeland RN  2021  10:20 AM

## 2021-06-28 ENCOUNTER — APPOINTMENT (OUTPATIENT)
Dept: GENERAL RADIOLOGY | Age: 61
DRG: 190 | End: 2021-06-28
Attending: EMERGENCY MEDICINE
Payer: MEDICARE

## 2021-06-28 ENCOUNTER — HOSPITAL ENCOUNTER (INPATIENT)
Age: 61
LOS: 3 days | Discharge: HOME HEALTH | DRG: 190 | End: 2021-07-01
Attending: EMERGENCY MEDICINE | Admitting: FAMILY MEDICINE
Payer: MEDICARE

## 2021-06-28 DIAGNOSIS — J44.1 COPD EXACERBATION (HCC): ICD-10-CM

## 2021-06-28 DIAGNOSIS — U07.1 ACUTE RESPIRATORY DISEASE DUE TO COVID-19 VIRUS: ICD-10-CM

## 2021-06-28 DIAGNOSIS — J06.9 ACUTE RESPIRATORY DISEASE DUE TO COVID-19 VIRUS: ICD-10-CM

## 2021-06-28 DIAGNOSIS — J44.1 ACUTE EXACERBATION OF CHRONIC OBSTRUCTIVE PULMONARY DISEASE (COPD) (HCC): Primary | ICD-10-CM

## 2021-06-28 DIAGNOSIS — R06.00 DYSPNEA, UNSPECIFIED TYPE: ICD-10-CM

## 2021-06-28 LAB
ALBUMIN SERPL-MCNC: 3.7 G/DL (ref 3.4–5)
ALBUMIN/GLOB SERPL: 0.9 {RATIO} (ref 0.8–1.7)
ALP SERPL-CCNC: 114 U/L (ref 45–117)
ALT SERPL-CCNC: 42 U/L (ref 13–56)
ANION GAP SERPL CALC-SCNC: 5 MMOL/L (ref 3–18)
AST SERPL-CCNC: 39 U/L (ref 10–38)
ATRIAL RATE: 76 BPM
B PERT DNA SPEC QL NAA+PROBE: NOT DETECTED
BASOPHILS # BLD: 0 K/UL (ref 0–0.1)
BASOPHILS NFR BLD: 0 % (ref 0–2)
BILIRUB SERPL-MCNC: 0.7 MG/DL (ref 0.2–1)
BNP SERPL-MCNC: 62 PG/ML (ref 0–900)
BORDETELLA PARAPERTUSSIS PCR, BORPAR: NOT DETECTED
BUN SERPL-MCNC: 13 MG/DL (ref 7–18)
BUN/CREAT SERPL: 14 (ref 12–20)
C PNEUM DNA SPEC QL NAA+PROBE: NOT DETECTED
CALCIUM SERPL-MCNC: 9.3 MG/DL (ref 8.5–10.1)
CALCULATED P AXIS, ECG09: 68 DEGREES
CALCULATED R AXIS, ECG10: 38 DEGREES
CALCULATED T AXIS, ECG11: 19 DEGREES
CHLORIDE SERPL-SCNC: 103 MMOL/L (ref 100–111)
CK MB CFR SERPL CALC: NORMAL % (ref 0–4)
CK MB SERPL-MCNC: <1 NG/ML (ref 5–25)
CK SERPL-CCNC: 191 U/L (ref 26–192)
CO2 SERPL-SCNC: 31 MMOL/L (ref 21–32)
CREAT SERPL-MCNC: 0.92 MG/DL (ref 0.6–1.3)
DIAGNOSIS, 93000: NORMAL
DIFFERENTIAL METHOD BLD: ABNORMAL
EOSINOPHIL # BLD: 0 K/UL (ref 0–0.4)
EOSINOPHIL NFR BLD: 0 % (ref 0–5)
ERYTHROCYTE [DISTWIDTH] IN BLOOD BY AUTOMATED COUNT: 13.8 % (ref 11.6–14.5)
EST. AVERAGE GLUCOSE BLD GHB EST-MCNC: 180 MG/DL
FLUAV H1 2009 PAND RNA SPEC QL NAA+PROBE: NOT DETECTED
FLUAV H1 RNA SPEC QL NAA+PROBE: NOT DETECTED
FLUAV H3 RNA SPEC QL NAA+PROBE: NOT DETECTED
FLUAV SUBTYP SPEC NAA+PROBE: NOT DETECTED
FLUBV RNA SPEC QL NAA+PROBE: NOT DETECTED
GLOBULIN SER CALC-MCNC: 4.3 G/DL (ref 2–4)
GLUCOSE BLD STRIP.AUTO-MCNC: 280 MG/DL (ref 70–110)
GLUCOSE BLD STRIP.AUTO-MCNC: 298 MG/DL (ref 70–110)
GLUCOSE BLD STRIP.AUTO-MCNC: 301 MG/DL (ref 70–110)
GLUCOSE SERPL-MCNC: 193 MG/DL (ref 74–99)
HADV DNA SPEC QL NAA+PROBE: NOT DETECTED
HBA1C MFR BLD: 7.9 % (ref 4.2–5.6)
HCOV 229E RNA SPEC QL NAA+PROBE: NOT DETECTED
HCOV HKU1 RNA SPEC QL NAA+PROBE: NOT DETECTED
HCOV NL63 RNA SPEC QL NAA+PROBE: NOT DETECTED
HCOV OC43 RNA SPEC QL NAA+PROBE: NOT DETECTED
HCT VFR BLD AUTO: 39.2 % (ref 35–45)
HGB BLD-MCNC: 12.6 G/DL (ref 12–16)
HMPV RNA SPEC QL NAA+PROBE: NOT DETECTED
HPIV1 RNA SPEC QL NAA+PROBE: NOT DETECTED
HPIV2 RNA SPEC QL NAA+PROBE: NOT DETECTED
HPIV3 RNA SPEC QL NAA+PROBE: NOT DETECTED
HPIV4 RNA SPEC QL NAA+PROBE: NOT DETECTED
LYMPHOCYTES # BLD: 1.4 K/UL (ref 0.9–3.6)
LYMPHOCYTES NFR BLD: 32 % (ref 21–52)
M PNEUMO DNA SPEC QL NAA+PROBE: NOT DETECTED
MCH RBC QN AUTO: 27.2 PG (ref 24–34)
MCHC RBC AUTO-ENTMCNC: 32.1 G/DL (ref 31–37)
MCV RBC AUTO: 84.7 FL (ref 74–97)
MONOCYTES # BLD: 0.3 K/UL (ref 0.05–1.2)
MONOCYTES NFR BLD: 7 % (ref 3–10)
NEUTS SEG # BLD: 2.7 K/UL (ref 1.8–8)
NEUTS SEG NFR BLD: 60 % (ref 40–73)
P-R INTERVAL, ECG05: 152 MS
PLATELET # BLD AUTO: 310 K/UL (ref 135–420)
PMV BLD AUTO: 8.7 FL (ref 9.2–11.8)
POTASSIUM SERPL-SCNC: 4.3 MMOL/L (ref 3.5–5.5)
PROT SERPL-MCNC: 8 G/DL (ref 6.4–8.2)
Q-T INTERVAL, ECG07: 426 MS
QRS DURATION, ECG06: 130 MS
QTC CALCULATION (BEZET), ECG08: 479 MS
RBC # BLD AUTO: 4.63 M/UL (ref 4.2–5.3)
RSV RNA SPEC QL NAA+PROBE: NOT DETECTED
RV+EV RNA SPEC QL NAA+PROBE: NOT DETECTED
SARS-COV-2 PCR, COVPCR: NOT DETECTED
SODIUM SERPL-SCNC: 139 MMOL/L (ref 136–145)
TROPONIN I SERPL-MCNC: <0.02 NG/ML (ref 0–0.04)
VENTRICULAR RATE, ECG03: 76 BPM
WBC # BLD AUTO: 4.4 K/UL (ref 4.6–13.2)

## 2021-06-28 PROCEDURE — 74011636637 HC RX REV CODE- 636/637: Performed by: STUDENT IN AN ORGANIZED HEALTH CARE EDUCATION/TRAINING PROGRAM

## 2021-06-28 PROCEDURE — 93005 ELECTROCARDIOGRAM TRACING: CPT

## 2021-06-28 PROCEDURE — 74011250636 HC RX REV CODE- 250/636: Performed by: STUDENT IN AN ORGANIZED HEALTH CARE EDUCATION/TRAINING PROGRAM

## 2021-06-28 PROCEDURE — 99284 EMERGENCY DEPT VISIT MOD MDM: CPT

## 2021-06-28 PROCEDURE — 83880 ASSAY OF NATRIURETIC PEPTIDE: CPT

## 2021-06-28 PROCEDURE — 74011000250 HC RX REV CODE- 250: Performed by: STUDENT IN AN ORGANIZED HEALTH CARE EDUCATION/TRAINING PROGRAM

## 2021-06-28 PROCEDURE — 80053 COMPREHEN METABOLIC PANEL: CPT

## 2021-06-28 PROCEDURE — 74011636637 HC RX REV CODE- 636/637: Performed by: FAMILY MEDICINE

## 2021-06-28 PROCEDURE — 96375 TX/PRO/DX INJ NEW DRUG ADDON: CPT

## 2021-06-28 PROCEDURE — 65660000000 HC RM CCU STEPDOWN

## 2021-06-28 PROCEDURE — 96367 TX/PROPH/DG ADDL SEQ IV INF: CPT

## 2021-06-28 PROCEDURE — 82962 GLUCOSE BLOOD TEST: CPT

## 2021-06-28 PROCEDURE — 96365 THER/PROPH/DIAG IV INF INIT: CPT

## 2021-06-28 PROCEDURE — 85025 COMPLETE CBC W/AUTO DIFF WBC: CPT

## 2021-06-28 PROCEDURE — 74011250637 HC RX REV CODE- 250/637: Performed by: FAMILY MEDICINE

## 2021-06-28 PROCEDURE — 82553 CREATINE MB FRACTION: CPT

## 2021-06-28 PROCEDURE — 83036 HEMOGLOBIN GLYCOSYLATED A1C: CPT

## 2021-06-28 PROCEDURE — 0202U NFCT DS 22 TRGT SARS-COV-2: CPT

## 2021-06-28 PROCEDURE — 71045 X-RAY EXAM CHEST 1 VIEW: CPT

## 2021-06-28 RX ORDER — PANTOPRAZOLE SODIUM 40 MG/1
40 TABLET, DELAYED RELEASE ORAL DAILY
Status: DISCONTINUED | OUTPATIENT
Start: 2021-06-29 | End: 2021-07-01 | Stop reason: HOSPADM

## 2021-06-28 RX ORDER — LISINOPRIL 20 MG/1
20 TABLET ORAL DAILY
Status: DISCONTINUED | OUTPATIENT
Start: 2021-06-29 | End: 2021-06-28

## 2021-06-28 RX ORDER — IPRATROPIUM BROMIDE AND ALBUTEROL SULFATE 2.5; .5 MG/3ML; MG/3ML
3 SOLUTION RESPIRATORY (INHALATION)
Status: DISCONTINUED | OUTPATIENT
Start: 2021-06-28 | End: 2021-07-01 | Stop reason: HOSPADM

## 2021-06-28 RX ORDER — FLUTICASONE PROPIONATE 110 UG/1
2 AEROSOL, METERED RESPIRATORY (INHALATION)
Status: DISCONTINUED | OUTPATIENT
Start: 2021-06-28 | End: 2021-06-29

## 2021-06-28 RX ORDER — LEVOFLOXACIN 5 MG/ML
750 INJECTION, SOLUTION INTRAVENOUS EVERY 24 HOURS
Status: DISCONTINUED | OUTPATIENT
Start: 2021-06-29 | End: 2021-06-28

## 2021-06-28 RX ORDER — LISINOPRIL 20 MG/1
20 TABLET ORAL DAILY
Status: DISCONTINUED | OUTPATIENT
Start: 2021-06-29 | End: 2021-07-01 | Stop reason: HOSPADM

## 2021-06-28 RX ORDER — SODIUM CHLORIDE 0.9 % (FLUSH) 0.9 %
5-40 SYRINGE (ML) INJECTION AS NEEDED
Status: DISCONTINUED | OUTPATIENT
Start: 2021-06-28 | End: 2021-07-01 | Stop reason: HOSPADM

## 2021-06-28 RX ORDER — ACETAMINOPHEN 325 MG/1
650 TABLET ORAL
Status: DISCONTINUED | OUTPATIENT
Start: 2021-06-28 | End: 2021-06-29

## 2021-06-28 RX ORDER — INSULIN GLARGINE 100 [IU]/ML
50 INJECTION, SOLUTION SUBCUTANEOUS
Status: DISCONTINUED | OUTPATIENT
Start: 2021-06-28 | End: 2021-07-01 | Stop reason: HOSPADM

## 2021-06-28 RX ORDER — IPRATROPIUM BROMIDE AND ALBUTEROL SULFATE 2.5; .5 MG/3ML; MG/3ML
3 SOLUTION RESPIRATORY (INHALATION)
Status: DISPENSED | OUTPATIENT
Start: 2021-06-28 | End: 2021-06-28

## 2021-06-28 RX ORDER — IPRATROPIUM BROMIDE AND ALBUTEROL SULFATE 2.5; .5 MG/3ML; MG/3ML
3 SOLUTION RESPIRATORY (INHALATION)
Status: COMPLETED | OUTPATIENT
Start: 2021-06-28 | End: 2021-06-28

## 2021-06-28 RX ORDER — LEVOFLOXACIN 5 MG/ML
750 INJECTION, SOLUTION INTRAVENOUS EVERY 24 HOURS
Status: DISCONTINUED | OUTPATIENT
Start: 2021-06-29 | End: 2021-06-29

## 2021-06-28 RX ORDER — SIMETHICONE 80 MG
120 TABLET,CHEWABLE ORAL
Status: DISCONTINUED | OUTPATIENT
Start: 2021-06-28 | End: 2021-07-01 | Stop reason: HOSPADM

## 2021-06-28 RX ORDER — POLYETHYLENE GLYCOL 3350 17 G/17G
17 POWDER, FOR SOLUTION ORAL DAILY PRN
Status: DISCONTINUED | OUTPATIENT
Start: 2021-06-28 | End: 2021-07-01 | Stop reason: HOSPADM

## 2021-06-28 RX ORDER — ONDANSETRON 4 MG/1
4 TABLET, ORALLY DISINTEGRATING ORAL
Status: DISCONTINUED | OUTPATIENT
Start: 2021-06-28 | End: 2021-06-29

## 2021-06-28 RX ORDER — MAGNESIUM SULFATE 100 %
16 CRYSTALS MISCELLANEOUS AS NEEDED
Status: DISCONTINUED | OUTPATIENT
Start: 2021-06-28 | End: 2021-07-01 | Stop reason: HOSPADM

## 2021-06-28 RX ORDER — LEVOFLOXACIN 500 MG/1
500 TABLET, FILM COATED ORAL DAILY
COMMUNITY
End: 2021-07-01

## 2021-06-28 RX ORDER — MAGNESIUM SULFATE HEPTAHYDRATE 40 MG/ML
2 INJECTION, SOLUTION INTRAVENOUS ONCE
Status: COMPLETED | OUTPATIENT
Start: 2021-06-28 | End: 2021-06-28

## 2021-06-28 RX ORDER — LEVOFLOXACIN 5 MG/ML
750 INJECTION, SOLUTION INTRAVENOUS
Status: COMPLETED | OUTPATIENT
Start: 2021-06-28 | End: 2021-06-28

## 2021-06-28 RX ORDER — AZITHROMYCIN 250 MG/1
250 TABLET, FILM COATED ORAL
Status: DISCONTINUED | OUTPATIENT
Start: 2021-06-28 | End: 2021-07-01 | Stop reason: HOSPADM

## 2021-06-28 RX ORDER — INSULIN LISPRO 100 [IU]/ML
INJECTION, SOLUTION INTRAVENOUS; SUBCUTANEOUS
Status: DISCONTINUED | OUTPATIENT
Start: 2021-06-29 | End: 2021-06-28

## 2021-06-28 RX ORDER — DEXTROSE 50 % IN WATER (D50W) INTRAVENOUS SYRINGE
25-50 AS NEEDED
Status: DISCONTINUED | OUTPATIENT
Start: 2021-06-28 | End: 2021-07-01 | Stop reason: HOSPADM

## 2021-06-28 RX ORDER — ONDANSETRON 2 MG/ML
4 INJECTION INTRAMUSCULAR; INTRAVENOUS
Status: DISCONTINUED | OUTPATIENT
Start: 2021-06-28 | End: 2021-06-29

## 2021-06-28 RX ORDER — ACETAMINOPHEN 650 MG/1
650 SUPPOSITORY RECTAL
Status: DISCONTINUED | OUTPATIENT
Start: 2021-06-28 | End: 2021-06-29

## 2021-06-28 RX ORDER — ASPIRIN 81 MG/1
81 TABLET ORAL DAILY
Status: DISCONTINUED | OUTPATIENT
Start: 2021-06-29 | End: 2021-07-01 | Stop reason: HOSPADM

## 2021-06-28 RX ORDER — FUROSEMIDE 40 MG/1
40 TABLET ORAL DAILY
Status: DISCONTINUED | OUTPATIENT
Start: 2021-06-29 | End: 2021-06-29

## 2021-06-28 RX ORDER — INSULIN LISPRO 100 [IU]/ML
INJECTION, SOLUTION INTRAVENOUS; SUBCUTANEOUS
Status: DISCONTINUED | OUTPATIENT
Start: 2021-06-29 | End: 2021-06-29

## 2021-06-28 RX ORDER — ENOXAPARIN SODIUM 100 MG/ML
40 INJECTION SUBCUTANEOUS DAILY
Status: DISCONTINUED | OUTPATIENT
Start: 2021-06-29 | End: 2021-07-01 | Stop reason: HOSPADM

## 2021-06-28 RX ORDER — SODIUM CHLORIDE 0.9 % (FLUSH) 0.9 %
5-40 SYRINGE (ML) INJECTION EVERY 8 HOURS
Status: DISCONTINUED | OUTPATIENT
Start: 2021-06-28 | End: 2021-07-01 | Stop reason: HOSPADM

## 2021-06-28 RX ADMIN — METHYLPREDNISOLONE SODIUM SUCCINATE 125 MG: 125 INJECTION, POWDER, FOR SOLUTION INTRAMUSCULAR; INTRAVENOUS at 13:42

## 2021-06-28 RX ADMIN — AZITHROMYCIN MONOHYDRATE 250 MG: 250 TABLET ORAL at 22:35

## 2021-06-28 RX ADMIN — IPRATROPIUM BROMIDE AND ALBUTEROL SULFATE 3 ML: .5; 3 SOLUTION RESPIRATORY (INHALATION) at 13:40

## 2021-06-28 RX ADMIN — FLUTICASONE PROPIONATE 2 PUFF: 110 AEROSOL, METERED RESPIRATORY (INHALATION) at 23:24

## 2021-06-28 RX ADMIN — Medication 10 ML: at 22:35

## 2021-06-28 RX ADMIN — ACETAMINOPHEN 650 MG: 325 TABLET ORAL at 20:00

## 2021-06-28 RX ADMIN — INSULIN GLARGINE 50 UNITS: 100 INJECTION, SOLUTION SUBCUTANEOUS at 22:35

## 2021-06-28 RX ADMIN — INSULIN LISPRO 6 UNITS: 100 INJECTION, SOLUTION INTRAVENOUS; SUBCUTANEOUS at 23:24

## 2021-06-28 RX ADMIN — IPRATROPIUM BROMIDE AND ALBUTEROL SULFATE 3 ML: .5; 3 SOLUTION RESPIRATORY (INHALATION) at 13:39

## 2021-06-28 RX ADMIN — MAGNESIUM SULFATE HEPTAHYDRATE 2 G: 2 INJECTION, SOLUTION INTRAVENOUS at 13:52

## 2021-06-28 RX ADMIN — LEVOFLOXACIN 750 MG: 5 INJECTION, SOLUTION INTRAVENOUS at 15:00

## 2021-06-28 RX ADMIN — IPRATROPIUM BROMIDE AND ALBUTEROL SULFATE 3 ML: .5; 3 SOLUTION RESPIRATORY (INHALATION) at 14:13

## 2021-06-28 NOTE — ED NOTES
Pt sitting on edge of bed reading on her phone. Complains of a headache (will provide Tylenol). Pt also concerned about her blood sugar. \"I haven't had any insulin since 8 this morning. Whenever I have steroids I know it shoots my sugar through the rough. \" Informed pt that lantus was ordered for her at 10 this evening. Checked blood sugar for pt's peace of mind with a result of 280. Has no further request or complaints at this time.

## 2021-06-28 NOTE — Clinical Note
Status[de-identified] INPATIENT [101]   Type of Bed: Medical [8]   Cardiac Monitoring Required?: No   Inpatient Hospitalization Certified Necessary for the Following Reasons: 3.  Patient receiving treatment that can only be provided in an inpatient setting (further clarification in H&P documentation)   Admitting Diagnosis: COPD exacerbation [facility]) [[id number]]   Admitting Physician: Sofia Blankenship [[id number]]   Attending Physician: Sofia Blankenship [[id number]]   Estimated Length of Stay: 2 Midnights   Discharge Plan[de-identified] Home with Office Follow-up

## 2021-06-28 NOTE — ED PROVIDER NOTES
EMERGENCY DEPARTMENT HISTORY AND PHYSICAL EXAM    11:19 AM      Date: 6/28/2021  Patient Name: Ayanna Vega    History of Presenting Illness     Chief Complaint   Patient presents with    Shortness of Breath         History Provided By: Patient  Location/Duration/Severity/Modifying factors   63 yoF with PMH COPD on 2 L home O2, asthma, HTN, HLD presenting with 1 week of SOB. Tightness of chest and difficulty breathing. Worsening cough productive of green sputum. Saw her doctor early last week who increased her prednisone dosing to 40 mg daily and prescribed levaquin. Patient did not start taking these medications until Saturday, but did not have any improvement. Taking her duonebs q3h and will start to wheeze following administration, but then becomes \"too tight\" and stops wheezing until next dose. Typically on 2 L home O2, but has required 3 L for ambulation. 15 admissions last year, 11 for COPD. No intubations. She did not take any of her medications today. PCP: Segundo Jaramillo MD    Current Outpatient Medications   Medication Sig Dispense Refill    azithromycin (ZITHROMAX) 250 mg tablet Take 1 Tab by mouth every Monday, Wednesday, Friday. 30 Tab 3    tiotropium bromide (Spiriva Respimat) 2.5 mcg/actuation inhaler Take 2 Puffs by inhalation daily. 3 Inhaler 3    fluticasone propion-salmeteroL (ADVAIR HFA) 696-76 mcg/actuation inhaler Take 2 Puffs by inhalation two (2) times a day. 3 Each 3    fluticasone propionate (FLOVENT HFA) 220 mcg/actuation inhaler Take 1 Puff by inhalation two (2) times a day. Rinse and gargle after each use 3 Inhaler 3    predniSONE (DELTASONE) 5 mg tablet Take 5mg on alternating days with 2.5mg the other days 90 Tab 1    Prolia 60 mg/mL injection INJECT 60MG SUBCUTANEOUSLY AS A SINGLE DOSE ONCE EVERY 6 MONTHS      simethicone (GAS-X) 125 mg capsule Take 125 mg by mouth four (4) times daily as needed for Flatulence.       insulin glargine (LANTUS,BASAGLAR) 100 unit/mL (3 mL) inpn 50 Units by SubCUTAneous route nightly. Indications: type 2 diabetes mellitus      furosemide (LASIX) 40 mg tablet Take 1 Tab by mouth two (2) times a day. 60 Tab 6    albuterol-ipratropium (DUO-NEB) 2.5 mg-0.5 mg/3 ml nebu 3 mL by Nebulization route every six (6) hours as needed for Wheezing.  glucose blood VI test strips (FreeStyle Lite Strips) strip Use four times daily for blood sugar checks.  Blood-Glucose Meter (FreeStyle Freedom Lite) monitoring kit CHECK BLOOD SUGARS B.I.D. E11.9      fluticasone propionate (FLONASE) 50 mcg/actuation nasal spray 2 Sprays by Nasal route daily.  lancets (FreeStyle Lancets) 28 gauge misc Use four times daily for blood sugar checks      Insulin Needles, Disposable, (BD Ultra-Fine Mini Pen Needle) 31 gauge x 3/16\" ndle Use with Basaglar once daily.  Insulin Syringe-Needle U-100 0.3 mL 31 gauge x 5/16\" syrg USE AS DIRECTED      acetaminophen (TYLENOL) 500 mg tablet Take 500 mg by mouth every six (6) hours as needed for Fever or Pain.  albuterol (PROVENTIL HFA, VENTOLIN HFA, PROAIR HFA) 90 mcg/actuation inhaler Take 2 Puffs by inhalation every four (4) hours as needed for Wheezing. 1 Inhaler 0    omeprazole (PRILOSEC) 40 mg capsule Take 40 mg by mouth daily.  lisinopril (PRINIVIL, ZESTRIL) 20 mg tablet Take 20 mg by mouth daily.  NOVOLOG FLEXPEN U-100 INSULIN 100 unit/mL inpn Continue home Sliding scale insulin as prior to admission (Patient taking differently: 1 Units by SubCUTAneous route three (3) times daily. If BG <100=0u  101-150=5u  151-250=8u  251-300=12u  >300 call MD  ) 1 Pen 0    OXYGEN-AIR DELIVERY SYSTEMS 2 L by Nasal route continuous. First Choice between 2L and 3L      aspirin delayed-release 81 mg tablet Take 81 mg by mouth daily.  montelukast (SINGULAIR) 10 mg tablet Take 10 mg by mouth nightly.          Past History     Past Medical History:  Past Medical History:   Diagnosis Date    Asthma  Chronic lung disease     COPD     Cystocele, midline     Diabetes mellitus ([facility] [address]     GERD (gastroesophageal reflux disease)     Hidradenitis suppurativa     Hyperlipidemia     Hypertension     SHERRIE on CPAP     CPAP    Stress incontinence        Past Surgical History:  Past Surgical History:   Procedure Laterality Date    HX APPENDECTOMY      HX CATARACT REMOVAL Right     HX CHOLECYSTECTOMY      HX DILATION AND CURETTAGE      Dysfunctional uterine bleeding, thought 2/2 fibroids    HX HEART CATHETERIZATION  2020    HX TUBAL LIGATION      NH BREAST SURGERY PROCEDURE UNLISTED      Right breast benign tumor removal       Family History:  Family History   Problem Relation Age of Onset    Hypertension Mother     Stroke Mother     Breast Cancer Mother         Bilateral mastectomies    Cancer Mother         ovarian and breast    Heart Failure Mother     Ovarian Cancer Mother     Heart Attack Father         2011    Heart Surgery Father         CABG    Heart Failure Father     COPD Sister         Heavy smoker    Cancer Sister         ovarian    Heart Failure Sister     Lung Disease Sister     Asthma Child     Cancer Maternal Aunt         pancreatic    Cancer Maternal Grandfather         stomach       Social History:  Social History     Tobacco Use    Smoking status: Former Smoker     Packs/day: 1.00     Years: 30.00     Pack years: 30.00     Types: Cigarettes     Start date: 1966     Quit date: 2006     Years since quittin.8    Smokeless tobacco: Former User    Tobacco comment: 1-1.5 packs per day   Vaping Use    Vaping Use: Never used   Substance Use Topics    Alcohol use: No    Drug use: No       Allergies:   Allergies   Allergen Reactions    Ancef [Cefazolin] Hives    Contrast Agent [Iodine] Anaphylaxis, Shortness of Breath and Swelling     Needs pre-medication for IV contrast with Benadryl, Solu-Medrol    Fish Containing Products Anaphylaxis     Pt states she had a severe allergic reaction at 9 y/o.  Statins-Hmg-Coa Reductase Inhibitors Myalgia    Metformin Other (comments)     Abdominal pain, diarrhea.  Codeine Other (comments)     Altered mental status         Review of Systems       Review of Systems   Constitutional: Negative for fatigue and fever. HENT: Negative for trouble swallowing. Eyes: Negative for visual disturbance. Respiratory: Positive for cough, chest tightness, shortness of breath and wheezing. Cardiovascular: Positive for chest pain. Negative for palpitations and leg swelling. Gastrointestinal: Positive for constipation. Negative for abdominal pain, diarrhea, nausea and vomiting. Genitourinary: Negative for dysuria and flank pain. Skin: Negative for rash. Neurological: Positive for weakness and light-headedness. Negative for syncope. Psychiatric/Behavioral: The patient is not nervous/anxious. Physical Exam     Visit Vitals  BP (!) 170/86 (BP 1 Location: Left upper arm)   Pulse 84   Temp 97.6 °F (36.4 °C)   Resp 18   Ht 5' 3\" (1.6 m)   Wt 118.4 kg (261 lb)   SpO2 98%   BMI 46.23 kg/m²         Physical Exam  Vitals and nursing note reviewed. Constitutional:       Appearance: She is ill-appearing. Eyes:      Extraocular Movements: Extraocular movements intact. Pupils: Pupils are equal, round, and reactive to light. Cardiovascular:      Rate and Rhythm: Normal rate and regular rhythm. Pulses:           Radial pulses are 2+ on the right side and 2+ on the left side. Posterior tibial pulses are 2+ on the right side and 2+ on the left side. Comments: Trace bilateral pitting edema  Pulmonary:      Effort: Tachypnea present. Breath sounds: Decreased breath sounds and wheezing present. Comments: Becoming short of breath with speaking  Abdominal:      Palpations: Abdomen is soft. Tenderness: There is no abdominal tenderness. Hernia: A hernia is present.  Hernia is present in the umbilical area (soft, nontender, reducible). Neurological:      Mental Status: She is alert and oriented to person, place, and time. Diagnostic Study Results     Labs -  Recent Results (from the past 12 hour(s))   METABOLIC PANEL, COMPREHENSIVE    Collection Time: 06/28/21 10:40 AM   Result Value Ref Range    Sodium 139 136 - 145 mmol/L    Potassium 4.3 3.5 - 5.5 mmol/L    Chloride 103 100 - 111 mmol/L    CO2 31 21 - 32 mmol/L    Anion gap 5 3.0 - 18 mmol/L    Glucose 193 (H) 74 - 99 mg/dL    BUN 13 7.0 - 18 MG/DL    Creatinine 0.92 0.6 - 1.3 MG/DL    BUN/Creatinine ratio 14 12 - 20      GFR est AA >60 >60 ml/min/1.73m2    GFR est non-AA >60 >60 ml/min/1.73m2    Calcium 9.3 8.5 - 10.1 MG/DL    Bilirubin, total 0.7 0.2 - 1.0 MG/DL    ALT (SGPT) 42 13 - 56 U/L    AST (SGOT) 39 (H) 10 - 38 U/L    Alk. phosphatase 114 45 - 117 U/L    Protein, total 8.0 6.4 - 8.2 g/dL    Albumin 3.7 3.4 - 5.0 g/dL    Globulin 4.3 (H) 2.0 - 4.0 g/dL    A-G Ratio 0.9 0.8 - 1.7     CBC WITH AUTOMATED DIFF    Collection Time: 06/28/21 10:40 AM   Result Value Ref Range    WBC 4.4 (L) 4.6 - 13.2 K/uL    RBC 4.63 4.20 - 5.30 M/uL    HGB 12.6 12.0 - 16.0 g/dL    HCT 39.2 35.0 - 45.0 %    MCV 84.7 74.0 - 97.0 FL    MCH 27.2 24.0 - 34.0 PG    MCHC 32.1 31.0 - 37.0 g/dL    RDW 13.8 11.6 - 14.5 %    PLATELET 376 202 - 098 K/uL    MPV 8.7 (L) 9.2 - 11.8 FL    NEUTROPHILS 60 40 - 73 %    LYMPHOCYTES 32 21 - 52 %    MONOCYTES 7 3 - 10 %    EOSINOPHILS 0 0 - 5 %    BASOPHILS 0 0 - 2 %    ABS. NEUTROPHILS 2.7 1.8 - 8.0 K/UL    ABS. LYMPHOCYTES 1.4 0.9 - 3.6 K/UL    ABS. MONOCYTES 0.3 0.05 - 1.2 K/UL    ABS. EOSINOPHILS 0.0 0.0 - 0.4 K/UL    ABS.  BASOPHILS 0.0 0.0 - 0.1 K/UL    DF AUTOMATED     CARDIAC PANEL,(CK, CKMB & TROPONIN)    Collection Time: 06/28/21 10:40 AM   Result Value Ref Range    CK - MB <1.0 <3.6 ng/ml    CK-MB Index  0.0 - 4.0 %     CALCULATION NOT PERFORMED WHEN RESULT IS BELOW LINEAR LIMIT     26 - 192 U/L    Troponin-I, QT <0.02 0.0 - 0.045 NG/ML   NT-PRO BNP    Collection Time: 06/28/21 10:40 AM   Result Value Ref Range    NT pro-BNP 62 0 - 900 PG/ML   EKG, 12 LEAD, INITIAL    Collection Time: 06/28/21 10:45 AM   Result Value Ref Range    Ventricular Rate 76 BPM    Atrial Rate 76 BPM    P-R Interval 152 ms    QRS Duration 130 ms    Q-T Interval 426 ms    QTC Calculation (Bezet) 479 ms    Calculated P Axis 68 degrees    Calculated R Axis 38 degrees    Calculated T Axis 19 degrees    Diagnosis       Normal sinus rhythm  Right bundle branch block  Abnormal ECG  When compared with ECG of 29-APR-2021 17:48,  No significant change was found         Radiologic Studies -   XR CHEST SNGL V   Final Result   1. No radiographic evidence of acute cardiopulmonary process. 2.  Unchanged patchy bibasilar opacities, favor atelectasis. Medical Decision Making   I am the first provider for this patient. I reviewed the vital signs, available nursing notes, past medical history, past surgical history, family history and social history. Vital Signs-Reviewed the patient's vital signs. EKG: Normal sinus rhythm. Rate 76. Narrow QRS. QTc 479. Stable RBBB. Normal axis. No ST segment or T wave abnormalities concerning for ischemia. Records Reviewed: Nursing Notes, Old Medical Records, Previous Radiology Studies and Previous Laboratory Studies (Time of Review: 11:19 AM)    ED Course: Progress Notes, Reevaluation, and Consults:         Provider Notes (Medical Decision Making):   MDM    63 yoF with PMH COPD, CHF presenting with chest tightness and shortness of breath with failed outpatient treatment. Vitals significant for hypertension and tachypnea. Physical exam showed decreased air movement in all lung fields. Labs:  No leukocytosis or anemia  No electrolyte abnormalities or renal dysfunction. Troponin and BNP negative    CXR interpretation: no pneumonia, pleural effusion, pneumothorax. Stable from prior.     3x duonebs, 2 g magnesium, 125 mg solumedrol, and 750 mg IV levofloxacin ordered. Patient satting well on 2 L O2.    1359  Patient still in waiting room awaiting medications. 1500  Patient re-evaluated following duo nebs and magnesium. She states her breathing feels \"a little\" better. Patient is jittery in the bed. Significantly more movement in bilateral lung fields without wheezing. 1520  Patient sleeping in hallway bed. No increased work of breathing, no wheezing. Easily arousable. Patient reports improved breathing, but continues to have worse dyspnea on ambulation than at baseline. Discussed course and possible options with patient to include home treatment with outpatient follow up and inpatient admission. Patient does not feel comfortable going home, has failed outpatient management with antibiotics, home duonebs, and steroid burst, and would benefit from inpatient treatment. 1550  Discussed patient history, presentation, and ED course with Dr. Yan Jaimes family medicine resident. She agreed to come evaluate and admit the patient. Shelby Snyder MD MSc  Emergency Medicine PGY-1        Diagnosis     Clinical Impression:   1. Acute exacerbation of chronic obstructive pulmonary disease (COPD) ([facility] [address]        Disposition: admit    Follow-up Information    None          Patient's Medications   Start Taking    No medications on file   Continue Taking    ACETAMINOPHEN (TYLENOL) 500 MG TABLET    Take 500 mg by mouth every six (6) hours as needed for Fever or Pain. ALBUTEROL (PROVENTIL HFA, VENTOLIN HFA, PROAIR HFA) 90 MCG/ACTUATION INHALER    Take 2 Puffs by inhalation every four (4) hours as needed for Wheezing. ALBUTEROL-IPRATROPIUM (DUO-NEB) 2.5 MG-0.5 MG/3 ML NEBU    3 mL by Nebulization route every six (6) hours as needed for Wheezing. ASPIRIN DELAYED-RELEASE 81 MG TABLET    Take 81 mg by mouth daily.     AZITHROMYCIN (ZITHROMAX) 250 MG TABLET    Take 1 Tab by mouth every Monday, Wednesday, Friday. BLOOD-GLUCOSE METER (FREESTYLE FREEDOM LITE) MONITORING KIT    CHECK BLOOD SUGARS B.I.D. E11.9    FLUTICASONE PROPION-SALMETEROL (ADVAIR HFA) 230-21 MCG/ACTUATION INHALER    Take 2 Puffs by inhalation two (2) times a day. FLUTICASONE PROPIONATE (FLONASE) 50 MCG/ACTUATION NASAL SPRAY    2 Sprays by Nasal route daily. FLUTICASONE PROPIONATE (FLOVENT HFA) 220 MCG/ACTUATION INHALER    Take 1 Puff by inhalation two (2) times a day. Rinse and gargle after each use    FUROSEMIDE (LASIX) 40 MG TABLET    Take 1 Tab by mouth two (2) times a day. GLUCOSE BLOOD VI TEST STRIPS (FREESTYLE LITE STRIPS) STRIP    Use four times daily for blood sugar checks. INSULIN GLARGINE (LANTUS,BASAGLAR) 100 UNIT/ML (3 ML) INPN    50 Units by SubCUTAneous route nightly. Indications: type 2 diabetes mellitus    INSULIN NEEDLES, DISPOSABLE, (BD ULTRA-FINE MINI PEN NEEDLE) 31 GAUGE X 3/16\" NDLE    Use with Basaglar once daily. INSULIN SYRINGE-NEEDLE U-100 0.3 ML 31 GAUGE X 5/16\" SYRG    USE AS DIRECTED    LANCETS (FREESTYLE LANCETS) 28 GAUGE MISC    Use four times daily for blood sugar checks    LISINOPRIL (PRINIVIL, ZESTRIL) 20 MG TABLET    Take 20 mg by mouth daily. MONTELUKAST (SINGULAIR) 10 MG TABLET    Take 10 mg by mouth nightly. NOVOLOG FLEXPEN U-100 INSULIN 100 UNIT/ML INPN    Continue home Sliding scale insulin as prior to admission    OMEPRAZOLE (PRILOSEC) 40 MG CAPSULE    Take 40 mg by mouth daily. OXYGEN-AIR DELIVERY SYSTEMS    2 L by Nasal route continuous. First Choice between 2L and 3L    PREDNISONE (DELTASONE) 5 MG TABLET    Take 5mg on alternating days with 2.5mg the other days    PROLIA 60 MG/ML INJECTION    INJECT 60MG SUBCUTANEOUSLY AS A SINGLE DOSE ONCE EVERY 6 MONTHS    SIMETHICONE (GAS-X) 125 MG CAPSULE    Take 125 mg by mouth four (4) times daily as needed for Flatulence. TIOTROPIUM BROMIDE (SPIRIVA RESPIMAT) 2.5 MCG/ACTUATION INHALER    Take 2 Puffs by inhalation daily.    These Medications have changed    No medications on file   Stop Taking    No medications on file     Disclaimer: Sections of this note are dictated using utilizing voice recognition software. Minor typographical errors may be present. If questions arise, please do not hesitate to contact me or call our department.

## 2021-06-28 NOTE — H&P
Admission History and Physical  [facility]      Patient: Joyce Banuelos MRN: [id number]  [facility]: [id number]    YOB: 1960  Age: 63 y.o. Sex: female      DOA: 6/28/2021       HPI:     Joyce Banuelos is a 63 y.o. female with PMH COPD on home O2 (3L NC), IDDM2, HTN, HFpEF, SHERRIE on CPAP, GERD, allergic rhinitis now presenting with complaint of 1-week Hx of SOB. Patient complains of 1-week of SOB and saw her PCP on 6/25 whereby she was given a prescription for Levaquin and prednisone (40mg) and started her medications on 6/27. She has increased work of breathing with exertion and has required her home oxygen (2L) continuously. Endorses cough and sputum production. Patient has had decreased PO intake due to difficulty breathing while eating. Denies lower extremity edema, fevers, chills or sick contacts. She typically on Prednisone PO 2 to 2.5mg for COPD. Reports that COPD has improved with infrequent exacerbations since starting Fasenra. Last Fasenra injection 6/22; receives 30mg SQH injections every 8weeks. Patient previously diagnosed and admitted for COVID and recovered; has not received COVID vaccine. ED Course:   Labs: CBC, CMP, pro-BNP  Diagnostics/Imaging: CXR, proBNP  Medications: Levaquin and Solumedrol     Review of Systems   Constitutional: Positive for malaise/fatigue. Negative for chills and fever. Respiratory: Positive for cough, sputum production, shortness of breath and wheezing. Cardiovascular: Negative for chest pain and leg swelling. Gastrointestinal: Negative for abdominal pain, diarrhea, nausea and vomiting. Neurological: Positive for dizziness. Negative for sensory change, speech change and headaches.        Past Medical History:   Diagnosis Date    Asthma     Chronic lung disease     COPD     Cystocele, midline     Diabetes mellitus (HCC)     GERD (gastroesophageal reflux disease)     Hidradenitis suppurativa     Hyperlipidemia     Hypertension     SHERRIE on CPAP     CPAP    Stress incontinence        Past Surgical History:   Procedure Laterality Date    HX APPENDECTOMY      HX CATARACT REMOVAL Right     HX CHOLECYSTECTOMY      HX DILATION AND CURETTAGE      Dysfunctional uterine bleeding, thought 2/2 fibroids    HX HEART CATHETERIZATION  2020    HX TUBAL LIGATION      [place] BREAST SURGERY PROCEDURE UNLISTED      Right breast benign tumor removal       Family History   Problem Relation Age of Onset    Hypertension Mother     Stroke Mother     Breast Cancer Mother         Bilateral mastectomies    Cancer Mother         ovarian and breast    Heart Failure Mother     Ovarian Cancer Mother     Heart Attack Father         2011    Heart Surgery Father         CABG    Heart Failure Father     COPD Sister         Heavy smoker    Cancer Sister         ovarian    Heart Failure Sister     Lung Disease Sister     Asthma Child     Cancer Maternal Aunt         pancreatic    Cancer Maternal Grandfather         stomach       Social History     Socioeconomic History    Marital status: LEGALLY      Spouse name: Not on file    Number of children: Not on file    Years of education: Not on file    Highest education level: Not on file   Tobacco Use    Smoking status: Former Smoker     Packs/day: 1.00     Years: 30.00     Pack years: 30.00     Types: Cigarettes     Start date: 1966     Quit date: 2006     Years since quittin.8    Smokeless tobacco: Former User    Tobacco comment: 1-1.5 packs per day   Vaping Use    Vaping Use: Never used   Substance and Sexual Activity    Alcohol use: No    Drug use: No    Sexual activity: Not Currently     Social Determinants of Health     Financial Resource Strain:     Difficulty of Paying Living Expenses:    Food Insecurity:     Worried About Running Out of Food in the Last Year:     [address] in the Last Year:    Transportation Needs:     Lack of Transportation (Medical):  Lack of Transportation (Non-Medical):    Physical Activity:     Days of Exercise per Week:     Minutes of Exercise per Session:    Stress:     Feeling of Stress :    Social Connections:     Frequency of Communication with Friends and Family:     Frequency of Social Gatherings with Friends and Family:     Attends [employer] Services:     Active Member of Clubs or Organizations:     Attends Club or Organization Meetings:     Marital Status:        Allergies   Allergen Reactions    Ancef [Cefazolin] Hives    Contrast Agent [Iodine] Anaphylaxis, Shortness of Breath and Swelling     Needs pre-medication for IV contrast with Benadryl, Solu-Medrol    Fish Containing Products Anaphylaxis     Pt states she had a severe allergic reaction at 9 y/o.  Statins-Hmg-Coa Reductase Inhibitors Myalgia    Metformin Other (comments)     Abdominal pain, diarrhea.  Codeine Other (comments)     Altered mental status       Prior to Admission Medications   Prescriptions Last Dose Informant Patient Reported? Taking? Blood-Glucose Meter (FreeStyle Freedom Lite) monitoring kit   Yes No   Sig: CHECK BLOOD SUGARS B.I.D. E11.9   Insulin Needles, Disposable, (BD Ultra-Fine Mini Pen Needle) 31 gauge x 3/16\" ndle   Yes No   Sig: Use with Basaglar once daily. Insulin Syringe-Needle U-100 0.3 mL 31 gauge x 5/16\" syrg   Yes No   Sig: USE AS DIRECTED   NOVOLOG FLEXPEN U-100 INSULIN 100 unit/mL inpn  Self No No   Sig: Continue home Sliding scale insulin as prior to admission   Patient taking differently: 1 Units by SubCUTAneous route three (3) times daily. If BG <100=0u  101-150=5u  151-250=8u  251-300=12u  >300 call MD     OXYGEN-AIR DELIVERY SYSTEMS   Yes No   Si L by Nasal route continuous.  First Choice between 2L and 3L   Prolia 60 mg/mL injection   Yes No   Sig: INJECT 60MG SUBCUTANEOUSLY AS A SINGLE DOSE ONCE EVERY 6 MONTHS   acetaminophen (TYLENOL) 500 mg tablet   Yes No   Sig: Take 500 mg by mouth every six (6) hours as needed for Fever or Pain. albuterol (PROVENTIL HFA, VENTOLIN HFA, PROAIR HFA) 90 mcg/actuation inhaler   No No   Sig: Take 2 Puffs by inhalation every four (4) hours as needed for Wheezing. albuterol-ipratropium (DUO-NEB) 2.5 mg-0.5 mg/3 ml nebu   Yes No   Sig: 3 mL by Nebulization route every six (6) hours as needed for Wheezing. aspirin delayed-release 81 mg tablet   Yes No   Sig: Take 81 mg by mouth daily. azithromycin (ZITHROMAX) 250 mg tablet   No No   Sig: Take 1 Tab by mouth every Monday, Wednesday, Friday. fluticasone propion-salmeteroL (ADVAIR HFA) 230-21 mcg/actuation inhaler   No No   Sig: Take 2 Puffs by inhalation two (2) times a day. fluticasone propionate (FLONASE) 50 mcg/actuation nasal spray   Yes No   Si Sprays by Nasal route daily. fluticasone propionate (FLOVENT HFA) 220 mcg/actuation inhaler   No No   Sig: Take 1 Puff by inhalation two (2) times a day. Rinse and gargle after each use   furosemide (LASIX) 40 mg tablet   No No   Sig: Take 1 Tab by mouth two (2) times a day. glucose blood VI test strips (FreeStyle Lite Strips) strip   Yes No   Sig: Use four times daily for blood sugar checks. insulin glargine (LANTUS,BASAGLAR) 100 unit/mL (3 mL) inpn   Yes No   Si Units by SubCUTAneous route nightly. Indications: type 2 diabetes mellitus   lancets (FreeStyle Lancets) 28 gauge misc   Yes No   Sig: Use four times daily for blood sugar checks   lisinopril (PRINIVIL, ZESTRIL) 20 mg tablet   Yes No   Sig: Take 20 mg by mouth daily. montelukast (SINGULAIR) 10 mg tablet   Yes No   Sig: Take 10 mg by mouth nightly. omeprazole (PRILOSEC) 40 mg capsule   Yes No   Sig: Take 40 mg by mouth daily. predniSONE (DELTASONE) 5 mg tablet   No No   Sig: Take 5mg on alternating days with 2.5mg the other days   simethicone (GAS-X) 125 mg capsule   Yes No   Sig: Take 125 mg by mouth four (4) times daily as needed for Flatulence.    tiotropium bromide (Spiriva Respimat) 2.5 mcg/actuation inhaler   No No   Sig: Take 2 Puffs by inhalation daily. Facility-Administered Medications: None       Physical Exam:     Patient Vitals for the past 24 hrs:   Temp Pulse Resp BP SpO2   06/28/21 1557 -- 84 18 (!) 170/86 98 %   06/28/21 1024 97.6 °F (36.4 °C) 81 16 (!) 183/94 98 %     Physical Exam  Constitutional:       General: She is not in acute distress. Appearance: She is not ill-appearing or diaphoretic. HENT:      Mouth/Throat:      Mouth: Mucous membranes are moist.      Pharynx: Oropharynx is clear. No oropharyngeal exudate or posterior oropharyngeal erythema. Cardiovascular:      Rate and Rhythm: Normal rate and regular rhythm. Pulses: Normal pulses. Heart sounds: Normal heart sounds. No murmur heard. Pulmonary:      Effort: Pulmonary effort is normal. No respiratory distress. Breath sounds: Wheezing present. No rhonchi or rales. Comments: Expiratory wheezes  Abdominal:      General: Abdomen is flat. Bowel sounds are normal. There is no distension. Palpations: Abdomen is soft. Tenderness: There is no abdominal tenderness. There is no guarding or rebound. Musculoskeletal:      Right lower leg: No edema. Left lower leg: No edema. Skin:     General: Skin is warm and dry. Capillary Refill: Capillary refill takes less than 2 seconds. Neurological:      Mental Status: She is oriented to person, place, and time. Motor: No weakness. IMAGING: XR CHEST SNGL V    Result Date: 6/28/2021  1. No radiographic evidence of acute cardiopulmonary process. 2.  Unchanged patchy bibasilar opacities, favor atelectasis.       Recent Results (from the past 12 hour(s))   METABOLIC PANEL, COMPREHENSIVE    Collection Time: 06/28/21 10:40 AM   Result Value Ref Range    Sodium 139 136 - 145 mmol/L    Potassium 4.3 3.5 - 5.5 mmol/L    Chloride 103 100 - 111 mmol/L    CO2 31 21 - 32 mmol/L    Anion gap 5 3.0 - 18 mmol/L Glucose 193 (H) 74 - 99 mg/dL    BUN 13 7.0 - 18 MG/DL    Creatinine 0.92 0.6 - 1.3 MG/DL    BUN/Creatinine ratio 14 12 - 20      GFR est AA >60 >60 ml/min/1.73m2    GFR est non-AA >60 >60 ml/min/1.73m2    Calcium 9.3 8.5 - 10.1 MG/DL    Bilirubin, total 0.7 0.2 - 1.0 MG/DL    ALT (SGPT) 42 13 - 56 U/L    AST (SGOT) 39 (H) 10 - 38 U/L    Alk. phosphatase 114 45 - 117 U/L    Protein, total 8.0 6.4 - 8.2 g/dL    Albumin 3.7 3.4 - 5.0 g/dL    Globulin 4.3 (H) 2.0 - 4.0 g/dL    A-G Ratio 0.9 0.8 - 1.7     CBC WITH AUTOMATED DIFF    Collection Time: 06/28/21 10:40 AM   Result Value Ref Range    WBC 4.4 (L) 4.6 - 13.2 K/uL    RBC 4.63 4.20 - 5.30 M/uL    HGB 12.6 12.0 - 16.0 g/dL    HCT 39.2 35.0 - 45.0 %    MCV 84.7 74.0 - 97.0 FL    MCH 27.2 24.0 - 34.0 PG    MCHC 32.1 31.0 - 37.0 g/dL    RDW 13.8 11.6 - 14.5 %    PLATELET 645 410 - 760 K/uL    MPV 8.7 (L) 9.2 - 11.8 FL    NEUTROPHILS 60 40 - 73 %    LYMPHOCYTES 32 21 - 52 %    MONOCYTES 7 3 - 10 %    EOSINOPHILS 0 0 - 5 %    BASOPHILS 0 0 - 2 %    ABS. NEUTROPHILS 2.7 1.8 - 8.0 K/UL    ABS. LYMPHOCYTES 1.4 0.9 - 3.6 K/UL    ABS. MONOCYTES 0.3 0.05 - 1.2 K/UL    ABS. EOSINOPHILS 0.0 0.0 - 0.4 K/UL    ABS.  BASOPHILS 0.0 0.0 - 0.1 K/UL    DF AUTOMATED     CARDIAC PANEL,(CK, CKMB & TROPONIN)    Collection Time: 06/28/21 10:40 AM   Result Value Ref Range    CK - MB <1.0 <3.6 ng/ml    CK-MB Index  0.0 - 4.0 %     CALCULATION NOT PERFORMED WHEN RESULT IS BELOW LINEAR LIMIT     26 - 192 U/L    Troponin-I, QT <0.02 0.0 - 0.045 NG/ML   NT-PRO BNP    Collection Time: 06/28/21 10:40 AM   Result Value Ref Range    NT pro-BNP 62 0 - 900 PG/ML   HEMOGLOBIN A1C WITH EAG    Collection Time: 06/28/21 10:40 AM   Result Value Ref Range    Hemoglobin A1c 7.9 (H) 4.2 - 5.6 %    Est. average glucose 180 mg/dL   EKG, 12 LEAD, INITIAL    Collection Time: 06/28/21 10:45 AM   Result Value Ref Range    Ventricular Rate 76 BPM    Atrial Rate 76 BPM    P-R Interval 152 ms    QRS Duration 130 ms    Q-T Interval 426 ms    QTC Calculation (Bezet) 479 ms    Calculated P Axis 68 degrees    Calculated R Axis 38 degrees    Calculated T Axis 19 degrees    Diagnosis       Normal sinus rhythm  Right bundle branch block  Abnormal ECG  When compared with ECG of 29-APR-2021 17:48,  No significant change was found  Confirmed by Sophia Gamez MD, ----- ([id number]) on 6/28/2021 5:03:51 PM           Assessment/Plan:   63 y.o. female with PMH COPD on home O2 (3L NC), IDDM2, HTN, HFpEF, SHERRIE on CPAP, GERD, allergic rhinitis now admitted with COPD excerbation    COPD exacerbation: SOB for 1-week with no improvement on outpatient therapy. Currently on Fasenra 30mg SQH every 8 weeks, last injection 6/22/2021 with improvement in COPD symptoms since that time. Followed by pulm (Dr. Smitha Coreas) out patient. CXR: acute cardiopulmonary process, unchanged patchy bibasilar opacities, favor atelectasis. - Admit to telemetry  - IV Solumedrol 40mg  - Levaquin 750mg IV  - duonebs q6 hours prn  - continue Advair and Spiriva daily or appropriate substitution per pharmacy   - albuterol nebs prn   - continue home azithromycin M, W, F  - Pulm consulted  - Vital signs per unit routine  - Monitor I/Os  - Ambulate as tolerated  - Daily CBC, BMP  - Lovenox 40 mg for DVT prophylaxis   - Replete electrolytes daily  - PT/OT/CM    HFpEF: ECHO (5/24/20) WW 59 - 70%. No regional wall motion abnormality. Moderate (grade 2) left ventricular diastolic dysfunction. Mildly dilated left atrium. Pulmonary arterial systolic pressure is 61 mmHg. Troponins negative on admission. BNP not elevated (62). EKG NSR, RBBB. - Continue Lasix 40mg daily     Insulin dependent Type 2 Diabetes: Glucose on admission 193. HbA1c 7.9 on admission.  Chronic use of steroids can contribute to her hyperglycemia.   -Continue Lantus at 50 U  -SSI   -Accuchecks   -Diabetic diet  -Diabetic educator consult     Hypertension: /86  - Continue Lisinopril 20mg daily at noon     GERD: Currently asymptomatic   - substitute home ompeprazole with protonix 40mg daily while inpatient      SHERRIE  - BIPAP at night, continue in-patient    [employer]:              PLAN              - PT/OT     - case management   Diet  Diabetic   DVT Prophylaxis  Lovenox   Code status  Full   Disposition  < 2MN      Point of Contact Otilia Longo  Relationship: Daughter  [phone]     Catalina Faye MD, PGY1   [address]   Intern Pager: [phone]   June 28, 2021     I have seen and independently evaluated the patient. I agree with the plan as noted above.     Signed By: Ronni Harrison MD     June 28, 2021

## 2021-06-28 NOTE — DIABETES MGMT
Diabetes Plan of Care    Recommend scheduling her lantus BID while receiving IV steroids - can begin with 20 units bid. Add corrective lispro, very insulin resistant. She may also require mealtime lispro. For diet - recommend 3 carb servings with no concentrated sweets       Assessment:  Patient known from previous admissions - last in Dec. 2020  She confirms her lantus is now 50 units daily - along with mealtime humalog. She is on a maintenance low dose of prednisone at home and started 40 mg PO daily on Saturday for her COPD exacerbation  Note her A1c is down to 7.9% from 9% in Dec. 2020. Has solu-medrol 40 mg every 8 hours ordered -   Will follow up tomorrow -    Most recent blood glucose values:       Within target range     No    Current A1c  Lab Results   Component Value Date/Time    Hemoglobin A1c 7.9 (H) 06/28/2021 10:40 AM     Adequate glycemic control PTA:    No     Current hospital diabetes medications - none ordered yet     Diet -     TDD previous day = new admit     Home diabetes medications;  lantus 50 units daily  humalog sliding scale with meals     Goals: Blood glucose will be within target of 70 - 180 mg/dl by - 7/2/2021     Education: will follow up tomorrow        Anca Cardenas MPH RN [address]  Pager [phone]  Office [phone]

## 2021-06-29 ENCOUNTER — APPOINTMENT (OUTPATIENT)
Dept: NON INVASIVE DIAGNOSTICS | Age: 61
DRG: 190 | End: 2021-06-29
Attending: FAMILY MEDICINE
Payer: MEDICARE

## 2021-06-29 LAB
ALBUMIN SERPL-MCNC: 3.6 G/DL (ref 3.4–5)
ALBUMIN/GLOB SERPL: 0.8 {RATIO} (ref 0.8–1.7)
ALP SERPL-CCNC: 109 U/L (ref 45–117)
ALT SERPL-CCNC: 44 U/L (ref 13–56)
ANION GAP SERPL CALC-SCNC: 10 MMOL/L (ref 3–18)
AST SERPL-CCNC: 27 U/L (ref 10–38)
BILIRUB SERPL-MCNC: 0.7 MG/DL (ref 0.2–1)
BUN SERPL-MCNC: 16 MG/DL (ref 7–18)
BUN/CREAT SERPL: 15 (ref 12–20)
CALCIUM SERPL-MCNC: 9.6 MG/DL (ref 8.5–10.1)
CHLORIDE SERPL-SCNC: 100 MMOL/L (ref 100–111)
CO2 SERPL-SCNC: 25 MMOL/L (ref 21–32)
CREAT SERPL-MCNC: 1.06 MG/DL (ref 0.6–1.3)
ECHO LV INTERNAL DIMENSION DIASTOLIC: 4.04 CM (ref 3.9–5.3)
ECHO LV INTERNAL DIMENSION SYSTOLIC: 2.74 CM
ECHO LV IVSD: 1.2 CM (ref 0.6–0.9)
ECHO LV MASS 2D: 160.8 G (ref 67–162)
ECHO LV MASS INDEX 2D: 74.1 G/M2 (ref 43–95)
ECHO LV POSTERIOR WALL DIASTOLIC: 1.13 CM (ref 0.6–0.9)
ECHO RV TAPSE: 2.36 CM (ref 1.5–2)
ECHO TV REGURGITANT MAX VELOCITY: 329.35 CM/S
ECHO TV REGURGITANT PEAK GRADIENT: 43.39 MMHG
ERYTHROCYTE [DISTWIDTH] IN BLOOD BY AUTOMATED COUNT: 13.9 % (ref 11.6–14.5)
GLOBULIN SER CALC-MCNC: 4.6 G/DL (ref 2–4)
GLUCOSE BLD STRIP.AUTO-MCNC: 215 MG/DL (ref 70–110)
GLUCOSE BLD STRIP.AUTO-MCNC: 279 MG/DL (ref 70–110)
GLUCOSE BLD STRIP.AUTO-MCNC: 285 MG/DL (ref 70–110)
GLUCOSE BLD STRIP.AUTO-MCNC: 286 MG/DL (ref 70–110)
GLUCOSE BLD STRIP.AUTO-MCNC: 296 MG/DL (ref 70–110)
GLUCOSE SERPL-MCNC: 282 MG/DL (ref 74–99)
HCT VFR BLD AUTO: 38.4 % (ref 35–45)
HGB BLD-MCNC: 12.6 G/DL (ref 12–16)
MAGNESIUM SERPL-MCNC: 2.2 MG/DL (ref 1.6–2.6)
MCH RBC QN AUTO: 28.1 PG (ref 24–34)
MCHC RBC AUTO-ENTMCNC: 32.8 G/DL (ref 31–37)
MCV RBC AUTO: 85.5 FL (ref 74–97)
PHOSPHATE SERPL-MCNC: 2.3 MG/DL (ref 2.5–4.9)
PLATELET # BLD AUTO: 344 K/UL (ref 135–420)
PMV BLD AUTO: 9.2 FL (ref 9.2–11.8)
POTASSIUM SERPL-SCNC: 4.3 MMOL/L (ref 3.5–5.5)
PROCALCITONIN SERPL-MCNC: <0.05 NG/ML
PROT SERPL-MCNC: 8.2 G/DL (ref 6.4–8.2)
RBC # BLD AUTO: 4.49 M/UL (ref 4.2–5.3)
SODIUM SERPL-SCNC: 135 MMOL/L (ref 136–145)
WBC # BLD AUTO: 9.1 K/UL (ref 4.6–13.2)

## 2021-06-29 PROCEDURE — 74011000250 HC RX REV CODE- 250: Performed by: FAMILY MEDICINE

## 2021-06-29 PROCEDURE — 84100 ASSAY OF PHOSPHORUS: CPT

## 2021-06-29 PROCEDURE — 74011250637 HC RX REV CODE- 250/637: Performed by: FAMILY MEDICINE

## 2021-06-29 PROCEDURE — 74011636637 HC RX REV CODE- 636/637: Performed by: STUDENT IN AN ORGANIZED HEALTH CARE EDUCATION/TRAINING PROGRAM

## 2021-06-29 PROCEDURE — 93325 DOPPLER ECHO COLOR FLOW MAPG: CPT | Performed by: INTERNAL MEDICINE

## 2021-06-29 PROCEDURE — 93321 DOPPLER ECHO F-UP/LMTD STD: CPT

## 2021-06-29 PROCEDURE — 93321 DOPPLER ECHO F-UP/LMTD STD: CPT | Performed by: INTERNAL MEDICINE

## 2021-06-29 PROCEDURE — 83735 ASSAY OF MAGNESIUM: CPT

## 2021-06-29 PROCEDURE — 94660 CPAP INITIATION&MGMT: CPT

## 2021-06-29 PROCEDURE — 74011250636 HC RX REV CODE- 250/636: Performed by: FAMILY MEDICINE

## 2021-06-29 PROCEDURE — 84145 PROCALCITONIN (PCT): CPT

## 2021-06-29 PROCEDURE — 2709999900 HC NON-CHARGEABLE SUPPLY

## 2021-06-29 PROCEDURE — 5A09357 ASSISTANCE WITH RESPIRATORY VENTILATION, LESS THAN 24 CONSECUTIVE HOURS, CONTINUOUS POSITIVE AIRWAY PRESSURE: ICD-10-PCS | Performed by: INTERNAL MEDICINE

## 2021-06-29 PROCEDURE — 74011000250 HC RX REV CODE- 250: Performed by: INTERNAL MEDICINE

## 2021-06-29 PROCEDURE — 94640 AIRWAY INHALATION TREATMENT: CPT

## 2021-06-29 PROCEDURE — 99222 1ST HOSP IP/OBS MODERATE 55: CPT | Performed by: INTERNAL MEDICINE

## 2021-06-29 PROCEDURE — 74011636637 HC RX REV CODE- 636/637: Performed by: FAMILY MEDICINE

## 2021-06-29 PROCEDURE — 93308 TTE F-UP OR LMTD: CPT | Performed by: INTERNAL MEDICINE

## 2021-06-29 PROCEDURE — 94761 N-INVAS EAR/PLS OXIMETRY MLT: CPT

## 2021-06-29 PROCEDURE — 80053 COMPREHEN METABOLIC PANEL: CPT

## 2021-06-29 PROCEDURE — 85027 COMPLETE CBC AUTOMATED: CPT

## 2021-06-29 PROCEDURE — 36415 COLL VENOUS BLD VENIPUNCTURE: CPT

## 2021-06-29 PROCEDURE — 65660000000 HC RM CCU STEPDOWN

## 2021-06-29 PROCEDURE — 77010033678 HC OXYGEN DAILY

## 2021-06-29 RX ORDER — BUDESONIDE 1 MG/2ML
1000 INHALANT ORAL
Status: DISCONTINUED | OUTPATIENT
Start: 2021-06-29 | End: 2021-07-01 | Stop reason: HOSPADM

## 2021-06-29 RX ORDER — FUROSEMIDE 40 MG/1
40 TABLET ORAL 2 TIMES DAILY
Status: DISCONTINUED | OUTPATIENT
Start: 2021-06-29 | End: 2021-07-01 | Stop reason: HOSPADM

## 2021-06-29 RX ORDER — ACETAMINOPHEN 500 MG
1000 TABLET ORAL EVERY 6 HOURS
Status: DISCONTINUED | OUTPATIENT
Start: 2021-06-29 | End: 2021-06-30

## 2021-06-29 RX ORDER — INSULIN LISPRO 100 [IU]/ML
INJECTION, SOLUTION INTRAVENOUS; SUBCUTANEOUS
Status: DISCONTINUED | OUTPATIENT
Start: 2021-06-29 | End: 2021-07-01 | Stop reason: HOSPADM

## 2021-06-29 RX ORDER — IPRATROPIUM BROMIDE AND ALBUTEROL SULFATE 2.5; .5 MG/3ML; MG/3ML
3 SOLUTION RESPIRATORY (INHALATION)
Status: DISCONTINUED | OUTPATIENT
Start: 2021-06-29 | End: 2021-06-30

## 2021-06-29 RX ORDER — ARFORMOTEROL TARTRATE 15 UG/2ML
15 SOLUTION RESPIRATORY (INHALATION)
Status: DISCONTINUED | OUTPATIENT
Start: 2021-06-29 | End: 2021-07-01 | Stop reason: HOSPADM

## 2021-06-29 RX ORDER — INSULIN LISPRO 100 [IU]/ML
INJECTION, SOLUTION INTRAVENOUS; SUBCUTANEOUS
Status: DISCONTINUED | OUTPATIENT
Start: 2021-06-29 | End: 2021-06-29

## 2021-06-29 RX ADMIN — LISINOPRIL 20 MG: 20 TABLET ORAL at 12:20

## 2021-06-29 RX ADMIN — LEVOFLOXACIN 750 MG: 5 INJECTION, SOLUTION INTRAVENOUS at 15:40

## 2021-06-29 RX ADMIN — FUROSEMIDE 40 MG: 40 TABLET ORAL at 09:32

## 2021-06-29 RX ADMIN — IPRATROPIUM BROMIDE AND ALBUTEROL SULFATE 3 ML: .5; 3 SOLUTION RESPIRATORY (INHALATION) at 10:39

## 2021-06-29 RX ADMIN — IPRATROPIUM BROMIDE AND ALBUTEROL SULFATE 3 ML: .5; 3 SOLUTION RESPIRATORY (INHALATION) at 15:25

## 2021-06-29 RX ADMIN — INSULIN LISPRO 4 UNITS: 100 INJECTION, SOLUTION INTRAVENOUS; SUBCUTANEOUS at 09:32

## 2021-06-29 RX ADMIN — ACETAMINOPHEN 1000 MG: 500 TABLET, FILM COATED ORAL at 18:42

## 2021-06-29 RX ADMIN — ACETAMINOPHEN 1000 MG: 500 TABLET, FILM COATED ORAL at 23:42

## 2021-06-29 RX ADMIN — PANTOPRAZOLE SODIUM 40 MG: 40 TABLET, DELAYED RELEASE ORAL at 09:32

## 2021-06-29 RX ADMIN — FUROSEMIDE 40 MG: 40 TABLET ORAL at 18:42

## 2021-06-29 RX ADMIN — METHYLPREDNISOLONE SODIUM SUCCINATE 40 MG: 40 INJECTION, POWDER, FOR SOLUTION INTRAMUSCULAR; INTRAVENOUS at 06:00

## 2021-06-29 RX ADMIN — IPRATROPIUM BROMIDE AND ALBUTEROL SULFATE 3 ML: .5; 3 SOLUTION RESPIRATORY (INHALATION) at 00:38

## 2021-06-29 RX ADMIN — METHYLPREDNISOLONE SODIUM SUCCINATE 40 MG: 40 INJECTION, POWDER, FOR SOLUTION INTRAMUSCULAR; INTRAVENOUS at 21:10

## 2021-06-29 RX ADMIN — IPRATROPIUM BROMIDE AND ALBUTEROL SULFATE 3 ML: .5; 3 SOLUTION RESPIRATORY (INHALATION) at 23:35

## 2021-06-29 RX ADMIN — IPRATROPIUM BROMIDE AND ALBUTEROL SULFATE 3 ML: .5; 3 SOLUTION RESPIRATORY (INHALATION) at 20:19

## 2021-06-29 RX ADMIN — PERFLUTREN 2 ML: 6.52 INJECTION, SUSPENSION INTRAVENOUS at 14:27

## 2021-06-29 RX ADMIN — Medication 10 ML: at 21:11

## 2021-06-29 RX ADMIN — BUDESONIDE 1000 MCG: 1 SUSPENSION RESPIRATORY (INHALATION) at 20:19

## 2021-06-29 RX ADMIN — Medication 10 ML: at 06:00

## 2021-06-29 RX ADMIN — ACETAMINOPHEN 1000 MG: 500 TABLET, FILM COATED ORAL at 09:39

## 2021-06-29 RX ADMIN — INSULIN LISPRO 9 UNITS: 100 INJECTION, SOLUTION INTRAVENOUS; SUBCUTANEOUS at 21:10

## 2021-06-29 RX ADMIN — Medication 81 MG: at 09:32

## 2021-06-29 RX ADMIN — INSULIN LISPRO 9 UNITS: 100 INJECTION, SOLUTION INTRAVENOUS; SUBCUTANEOUS at 12:18

## 2021-06-29 RX ADMIN — INSULIN LISPRO 9 UNITS: 100 INJECTION, SOLUTION INTRAVENOUS; SUBCUTANEOUS at 18:41

## 2021-06-29 RX ADMIN — Medication 10 ML: at 15:43

## 2021-06-29 RX ADMIN — ARFORMOTEROL TARTRATE 15 MCG: 15 SOLUTION RESPIRATORY (INHALATION) at 20:19

## 2021-06-29 RX ADMIN — INSULIN GLARGINE 50 UNITS: 100 INJECTION, SOLUTION SUBCUTANEOUS at 21:10

## 2021-06-29 RX ADMIN — ENOXAPARIN SODIUM 40 MG: 40 INJECTION SUBCUTANEOUS at 09:34

## 2021-06-29 RX ADMIN — METHYLPREDNISOLONE SODIUM SUCCINATE 40 MG: 40 INJECTION, POWDER, FOR SOLUTION INTRAMUSCULAR; INTRAVENOUS at 15:42

## 2021-06-29 NOTE — CONSULTS
[facility]. Pulmonary, Critical Care, and Sleep Medicine    Initial Patient Consult    Name: Thanh Lawson MRN: [id number]   : 1960 Hospital: [address] [place] [address] Dr   Date: 2021        IMPRESSION:   · Acute on Chronic Respiratory Failure  · - currently on 2L NC  · Acute exacerbation of COPD/asthma overlap syndrome   · Severe persistent asthma with steroid dependence  · - follows with Steff [facility]. Hx of eosinophilia- on benralizumab injections for the last year, and notes significant improvement. Pt also on dual ICS/LABA/LAMA/LTRA therapy  · COPD, gold D  · Morbid obesity: Body mass index is 46.75 kg/m². · Obesity hypoventilation syndrome with SHERRIE: Patient on trilogy in the [facility]- setting  · Hx of Covid19 infection- 2020 requiring inpatient therapy  · Pulmonary HTN- group 2  · Osteoporosis  · Hx of tobacco abuse- quit in .       Patient Active Problem List   Diagnosis Code    Essential hypertension, benign I10    Diabetes mellitus, type 2 ([facility] [address] E11.9    Esophageal reflux K21.9    SHERRIE on CPAP G47.33, Z99.89    COPD (chronic obstructive pulmonary disease) ([facility]) J44.9    Allergic rhinitis J30.9    COPD with acute exacerbation ([facility]) J44.1    Obesity, Class III, BMI 40-49.9 (morbid obesity) ([facility]) E66.01    Chest pain R07.9    Dyspnea R06.00    COPD exacerbation ([facility]) J44.1    Acute exacerbation of COPD with asthma ([facility]) J44.1, G42.437    Diastolic CHF, chronic ([facility]) I50.32    Diverticulosis K57.90    COPD with acute bronchitis ([facility]) J44.0, J20.9    Hyperlipidemia E78.5    Type 2 diabetes with nephropathy ([facility]) E11.21    Bilateral carotid bruits R09.89    Palpitations R00.2    Acute exacerbation of chronic obstructive pulmonary disease (COPD) ([facility]) J44.1    Atelectasis of right lung J98.11    Hypoxia R09.02    COVID-19 U07.1    Hypokalemia E87.6    COVID-19 virus infection U07.1    Acute respiratory disease due to COVID-19 virus U07.1, J06.9  Pneumonia due to COVID-19 virus U07.1, J12.82    Severe persistent asthma with exacerbation J45.51    Acute otitis externa H60.509    Anxiety F41.9    Diastasis recti M64.45    Diastolic dysfunction Y41.62    Diverticulitis K57.92    Pleurisy R09.1    H/O allergy Z88.9    Hepatic steatosis K76.0    Hypersomnia G47.10    Inadequate sleep hygiene Z72.821    Lung nodule R91.1    Obstructive chronic bronchitis with acute exacerbation (HCC) J44.1    Osteoarthritis M19.90    Osteoporosis M81.0    Periodic limb movements of sleep G47.61    Pneumonia J18.9    Racing heart beat R00.0    Right upper quadrant abdominal pain R10.11    Status asthmaticus J45.902    Urinary frequency R35.0    Urinary tract infection N39.0    Vitamin D deficiency E55.9    Heart failure ([facility]) I50.9    CHF (congestive heart failure) ([facility]) I50.9      RECOMMENDATIONS:   · Will increase to brovana and pulmicort nebs, increase scheduling of duonebs  · Continue IV steroids for now  · No evidence of PNA on CXR- ordered procal- if neg, would D/C abx other than chronic azithromycin  · RVP negative  · BIPAP therapy at night  · Will order IS therapy  · Aspiration precautions  · Assess home Oxygen needs at discharge  · OT, PT, OOB and ambulate    Will follow     Subjective: This patient has been seen and evaluated at the request of Dr. Elli Horton for COPD exacerbation. Patient is a 63 y.o. female that is well known to our service for chronic respiratory failure with COPD/asthma overlap syndrome who presented with about a week of progressive dyspnea. She stated a week ago she began to feel more weak, rundown, and dyspneic on exertion. She went to her PCP who gave her levaquin and a prednisone pack. She took them for a couple days without any relief so she came to the ED. In the ED, she was given O2, ABX, steroids and breathing treatments with some relief and was admitted to the floor.  She is very compliant with her regimen at home and regularly follows up with Mirta Rodríguez in our clinic. Her last exacerbation was in 8/2020 due to covid19. She denies fever, endorses a dry cough. CXR was negative on admission, no leukocytosis or fevers noted. On my assessment, she appears very comfortable on NC. She states she doesn't feel better since admission. Vitals stable. Past Medical History:   Diagnosis Date    Asthma     Chronic lung disease     COPD     Cystocele, midline     Diabetes mellitus ([address]     GERD (gastroesophageal reflux disease)     Hidradenitis suppurativa     Hyperlipidemia     Hypertension     SHERRIE on CPAP     CPAP    Stress incontinence       Past Surgical History:   Procedure Laterality Date    HX APPENDECTOMY      HX CATARACT REMOVAL Right     HX CHOLECYSTECTOMY      HX DILATION AND CURETTAGE      Dysfunctional uterine bleeding, thought 2/2 fibroids    HX HEART CATHETERIZATION  12/2020    HX TUBAL LIGATION      [place] BREAST SURGERY PROCEDURE UNLISTED      Right breast benign tumor removal      Prior to Admission medications    Medication Sig Start Date End Date Taking? Authorizing Provider   levoFLOXacin (LEVAQUIN) 500 mg tablet Take 500 mg by mouth daily. Yes Provider, Historical   azithromycin (ZITHROMAX) 250 mg tablet Take 1 Tab by mouth every Monday, Wednesday, Friday. 4/28/21  Yes Tylor Jung MD   tiotropium bromide (Spiriva Respimat) 2.5 mcg/actuation inhaler Take 2 Puffs by inhalation daily. 4/27/21  Yes Tylor Jung MD   fluticasone propion-salmeteroL (ADVAIR HFA) 657-45 mcg/actuation inhaler Take 2 Puffs by inhalation two (2) times a day. 4/27/21  Yes Tylor Jung MD   fluticasone propionate (FLOVENT HFA) 220 mcg/actuation inhaler Take 1 Puff by inhalation two (2) times a day.  Rinse and gargle after each use 4/27/21  Yes Tylor Jung MD   Prolia 60 mg/mL injection INJECT 60MG SUBCUTANEOUSLY AS A SINGLE DOSE ONCE EVERY 6 MONTHS 11/23/20  Yes Provider, Historical   simethicone (GAS-X) 125 mg capsule Take 125 mg by mouth four (4) times daily as needed for Flatulence. Yes Provider, Historical   insulin glargine (LANTUS,BASAGLAR) 100 unit/mL (3 mL) inpn 50 Units by SubCUTAneous route nightly. Indications: type 2 diabetes mellitus   Yes Provider, Historical   furosemide (LASIX) 40 mg tablet Take 1 Tab by mouth two (2) times a day. 12/3/20  Yes Xochilt Serrano MD   albuterol-ipratropium (DUO-NEB) 2.5 mg-0.5 mg/3 ml nebu 3 mL by Nebulization route every six (6) hours as needed for Wheezing. Yes Provider, Historical   glucose blood VI test strips (FreeStyle Lite Strips) strip Use four times daily for blood sugar checks. 6/16/17  Yes Provider, Historical   Blood-Glucose Meter (FreeStyle Freedom Lite) monitoring kit CHECK BLOOD SUGARS B.I.D. E11.9 6/16/17  Yes Provider, Historical   fluticasone propionate (FLONASE) 50 mcg/actuation nasal spray 2 Sprays by Nasal route daily. 8/26/10  Yes Provider, Historical   lancets (FreeStyle Lancets) 28 gauge misc Use four times daily for blood sugar checks 6/16/17  Yes Provider, Historical   Insulin Needles, Disposable, (BD Ultra-Fine Mini Pen Needle) 31 gauge x 3/16\" ndle Use with Basaglar once daily. 4/4/18  Yes Provider, Historical   Insulin Syringe-Needle U-100 0.3 mL 31 gauge x 5/16\" syrg USE AS DIRECTED 2/14/17  Yes Provider, Historical   albuterol (PROVENTIL HFA, VENTOLIN HFA, PROAIR HFA) 90 mcg/actuation inhaler Take 2 Puffs by inhalation every four (4) hours as needed for Wheezing. 7/7/20  Yes Evonne aKte PA-C   omeprazole (PRILOSEC) 40 mg capsule Take 40 mg by mouth daily. 3/9/18  Yes Provider, Historical   lisinopril (PRINIVIL, ZESTRIL) 20 mg tablet Take 20 mg by mouth daily. Yes Provider, Historical   OXYGEN-AIR DELIVERY SYSTEMS 2 L by Nasal route continuous. First Choice between 2L and 3L   Yes Provider, Historical   aspirin delayed-release 81 mg tablet Take 81 mg by mouth daily.    Yes Provider, Historical   montelukast (SINGULAIR) 10 mg tablet Take 10 mg by mouth nightly. Yes Other, MD Lauren   predniSONE (DELTASONE) 5 mg tablet Take 5mg on alternating days with 2.5mg the other days 21   Meera Noble MD   acetaminophen (TYLENOL) 500 mg tablet Take 500 mg by mouth every six (6) hours as needed for Fever or Pain. Patient not taking: Reported on 2021    Provider, Historical   NOVOLOG FLEXPEN U-100 INSULIN 100 unit/mL inpn Continue home Sliding scale insulin as prior to admission  Patient not taking: Reported on 2021 7/10/18   Serena Moralez MD     Allergies   Allergen Reactions    Ancef [Cefazolin] Hives    Contrast Agent [Iodine] Anaphylaxis, Shortness of Breath and Swelling     Needs pre-medication for IV contrast with Benadryl, Solu-Medrol    Fish Containing Products Anaphylaxis     Pt states she had a severe allergic reaction at 7 y/o.  Statins-Hmg-Coa Reductase Inhibitors Myalgia    Metformin Other (comments)     Abdominal pain, diarrhea.     Codeine Other (comments)     Altered mental status      Social History     Tobacco Use    Smoking status: Former Smoker     Packs/day: 1.00     Years: 30.00     Pack years: 30.00     Types: Cigarettes     Start date: 1966     Quit date: 2006     Years since quittin.8    Smokeless tobacco: Former User    Tobacco comment: 1-1.5 packs per day   Substance Use Topics    Alcohol use: No      Family History   Problem Relation Age of Onset    Hypertension Mother     Stroke Mother     Breast Cancer Mother         Bilateral mastectomies    Cancer Mother         ovarian and breast    Heart Failure Mother     Ovarian Cancer Mother     Heart Attack Father             Heart Surgery Father         CABG    Heart Failure Father     COPD Sister         Heavy smoker    Cancer Sister         ovarian    Heart Failure Sister     Lung Disease Sister     Asthma Child     Cancer Maternal Aunt         pancreatic    Cancer Maternal Grandfather         stomach Current Facility-Administered Medications   Medication Dose Route Frequency    albuterol-ipratropium (DUO-NEB) 2.5 MG-0.5 MG/3 ML  3 mL Nebulization Q4H RT    insulin lispro (HUMALOG) injection   SubCUTAneous AC&HS    acetaminophen (TYLENOL) tablet 1,000 mg  1,000 mg Oral Q6H    arformoteroL (BROVANA) neb solution 15 mcg  15 mcg Nebulization BID RT    budesonide (PULMICORT) 1,000 mcg/2 mL nebulizer susp  1,000 mcg Nebulization BID RT    methylPREDNISolone (PF) (SOLU-MEDROL) injection 40 mg  40 mg IntraVENous Q8H    levoFLOXacin (LEVAQUIN) 750 mg in D5W IVPB  750 mg IntraVENous Q24H    aspirin delayed-release tablet 81 mg  81 mg Oral DAILY    azithromycin (ZITHROMAX) tablet 250 mg  250 mg Oral Q MON, WED & FRI    insulin glargine (LANTUS) injection 50 Units  50 Units SubCUTAneous QHS    pantoprazole (PROTONIX) tablet 40 mg  40 mg Oral DAILY    tiotropium bromide (SPIRIVA RESPIMAT) 2.5 mcg /actuation  2 Puff Inhalation DAILY    sodium chloride (NS) flush 5-40 mL  5-40 mL IntraVENous Q8H    enoxaparin (LOVENOX) injection 40 mg  40 mg SubCUTAneous DAILY    lisinopriL (PRINIVIL, ZESTRIL) tablet 20 mg  20 mg Oral DAILY    furosemide (LASIX) tablet 40 mg  40 mg Oral DAILY       Review of Systems:  Pertinent items are noted in HPI.   ROS    Objective:   Vital Signs:    Visit Vitals  BP (!) 142/65   Pulse 84   Temp 97.5 °F (36.4 °C)   Resp 20   Ht 5' 3\" (1.6 m)   Wt 118.4 kg (261 lb)   SpO2 94%   BMI 46.23 kg/m²       O2 Device: Nasal cannula   O2 Flow Rate (L/min): 2 l/min   Temp (24hrs), Av.5 °F (36.4 °C), Min:97.2 °F (36.2 °C), Max:97.9 °F (36.6 °C)       Intake/Output:   Last shift:      701 - 1900  In: 140 [P.O.:140]  Out: -   Last 3 shifts: 1901 -  0700  In: 200 [I.V.:200]  Out: -     Intake/Output Summary (Last 24 hours) at 2021 1350  Last data filed at 2021 0911  Gross per 24 hour   Intake 340 ml   Output --   Net 340 ml      Physical Exam:   General:  Alert, cooperative, no distress, appears stated age. Head:  Normocephalic, without obvious abnormality, atraumatic. Eyes:  Conjunctivae/corneas clear. ANicteric   Lungs:   Bilateral auscultation with decreased BS, mild wheezing, no rales. Chest wall:  No tenderness or deformity. NO CREPITUS   Heart:  Regular rate and rhythm, S1, S2 normal, no murmur, click, rub or gallop. Abdomen:   Soft, non-tender. Bowel sounds normal. No masses,  No organomegaly. No paradox   Extremities: normal, atraumatic, no cyanosis or edema.    Neurologic: Grossly nonfocal          Data review:   Labs:  Recent Results (from the past 24 hour(s))   RESPIRATORY VIRUS PANEL W/COVID-19, PCR    Collection Time: 06/28/21  5:25 PM    Specimen: Nasopharyngeal   Result Value Ref Range    Adenovirus Not detected NOTD      Coronavirus 229E Not detected NOTD      Coronavirus HKU1 Not detected NOTD      Coronavirus CVNL63 Not detected NOTD      Coronavirus OC43 Not detected NOTD      SARS-CoV-2, PCR Not detected NOTD      Metapneumovirus Not detected NOTD      Rhinovirus and Enterovirus Not detected NOTD      Influenza A Not detected NOTD      Influenza A, subtype H1 Not detected NOTD      Influenza A, subtype H3 Not detected NOTD      INFLUENZA A H1N1 PCR Not detected NOTD      Influenza B Not detected NOTD      Parainfluenza 1 Not detected NOTD      Parainfluenza 2 Not detected NOTD      Parainfluenza 3 Not detected NOTD      Parainfluenza virus 4 Not detected NOTD      RSV by PCR Not detected NOTD      B. parapertussis, PCR Not detected NOTD      Bordetella pertussis - PCR Not detected NOTD      Chlamydophila pneumoniae DNA, QL, PCR Not detected NOTD      Mycoplasma pneumoniae DNA, QL, PCR Not detected NOTD     GLUCOSE, POC    Collection Time: 06/28/21  7:54 PM   Result Value Ref Range    Glucose (POC) 280 (H) 70 - 110 mg/dL   GLUCOSE, POC    Collection Time: 06/28/21  9:33 PM   Result Value Ref Range    Glucose (POC) 301 (H) 70 - 110 mg/dL   GLUCOSE, POC    Collection Time: 06/28/21  9:33 PM   Result Value Ref Range    Glucose (POC) 285 (H) 70 - 110 mg/dL   GLUCOSE, POC    Collection Time: 06/28/21 11:22 PM   Result Value Ref Range    Glucose (POC) 298 (H) 70 - 389 mg/dL   METABOLIC PANEL, COMPREHENSIVE    Collection Time: 06/29/21  1:02 AM   Result Value Ref Range    Sodium 135 (L) 136 - 145 mmol/L    Potassium 4.3 3.5 - 5.5 mmol/L    Chloride 100 100 - 111 mmol/L    CO2 25 21 - 32 mmol/L    Anion gap 10 3.0 - 18 mmol/L    Glucose 282 (H) 74 - 99 mg/dL    BUN 16 7.0 - 18 MG/DL    Creatinine 1.06 0.6 - 1.3 MG/DL    BUN/Creatinine ratio 15 12 - 20      GFR est AA >60 >60 ml/min/1.73m2    GFR est non-AA 53 (L) >60 ml/min/1.73m2    Calcium 9.6 8.5 - 10.1 MG/DL    Bilirubin, total 0.7 0.2 - 1.0 MG/DL    ALT (SGPT) 44 13 - 56 U/L    AST (SGOT) 27 10 - 38 U/L    Alk.  phosphatase 109 45 - 117 U/L    Protein, total 8.2 6.4 - 8.2 g/dL    Albumin 3.6 3.4 - 5.0 g/dL    Globulin 4.6 (H) 2.0 - 4.0 g/dL    A-G Ratio 0.8 0.8 - 1.7     CBC W/O DIFF    Collection Time: 06/29/21  1:02 AM   Result Value Ref Range    WBC 9.1 4.6 - 13.2 K/uL    RBC 4.49 4.20 - 5.30 M/uL    HGB 12.6 12.0 - 16.0 g/dL    HCT 38.4 35.0 - 45.0 %    MCV 85.5 74.0 - 97.0 FL    MCH 28.1 24.0 - 34.0 PG    MCHC 32.8 31.0 - 37.0 g/dL    RDW 13.9 11.6 - 14.5 %    PLATELET 135 011 - 648 K/uL    MPV 9.2 9.2 - 11.8 FL   MAGNESIUM    Collection Time: 06/29/21  1:02 AM   Result Value Ref Range    Magnesium 2.2 1.6 - 2.6 mg/dL   PHOSPHORUS    Collection Time: 06/29/21  1:02 AM   Result Value Ref Range    Phosphorus 2.3 (L) 2.5 - 4.9 MG/DL   GLUCOSE, POC    Collection Time: 06/29/21  7:05 AM   Result Value Ref Range    Glucose (POC) 215 (H) 70 - 110 mg/dL   GLUCOSE, POC    Collection Time: 06/29/21 11:22 AM   Result Value Ref Range    Glucose (POC) 279 (H) 70 - 110 mg/dL     Imaging:  I have personally reviewed the patients radiographs and have reviewed the reports:  CXR Results  (Last 48 hours) 06/28/21 1118  XR CHEST SNGL V Final result    Impression:  1. No radiographic evidence of acute cardiopulmonary process. 2.  Unchanged patchy bibasilar opacities, favor atelectasis. Narrative:  EXAM: XR CHEST SNGL V       INDICATION: 63 years Female. shortness of breath. ADDITIONAL HISTORY: None. TECHNIQUE: Frontal view of the chest.       COMPARISON: Chest radiograph 3/30/2021. FINDINGS:       Underpenetration limits assessment of the lung bases, left greater than right. The cardiac silhouette is within normal limits in size. Atherosclerotic   calcifications are noted in the aorta. Pulmonary vasculature appears within   normal limits. Streaky/patchy opacities at the bilateral lung bases, which are similar in   appearance to prior chest radiograph from 3/30/2021 and likely reflect   atelectasis. No definite evidence of pleural effusion or pneumothorax. No acute osseous abnormality appreciated. CT Results  (Last 48 hours)    None            High complexity decision making was performed during the evaluation of this patient at high risk for decompensation with multiple organ involvement     Above mentioned total time spent on reviewing the case/medical record/data/notes/EMR/patient examination/documentation/coordinating care with nurse/consultants, exclusive of procedures with complex decision making performed and > 50% time spent in face to face evaluation.      Keke Raymond MD

## 2021-06-29 NOTE — PROGRESS NOTES
[facility]  Progress Note    Patient: Dwayne Smith MRN: [id number]   SSN: [id number]  YOB: 1960   Age: 63 y.o. Sex: female      Admit Date: 6/28/2021    LOS: 2 days   Chief Complaint   Patient presents with    Shortness of Breath       Subjective:     No acute overnight events. Patient seen at bedside, sleeping with BiPAP. Reports her breathing is much better, chest is less tight and headache is now resolved with scheduled tylenol. Blood glucose continues to be elevated and mealtime lispro added as per Diabetic Education recs. She is tolerating PO intake well without nausea/vomiting. Endorses SOB with exertion only. Review of Systems   Constitutional: Positive for malaise/fatigue. Negative for chills and fever. Respiratory: Positive for cough and shortness of breath. Cardiovascular: Negative for chest pain. Gastrointestinal: Negative for abdominal pain. Neurological: Negative for headaches. Objective:     Patient Vitals for the past 12 hrs:   Temp Pulse Resp BP SpO2   06/30/21 0415 98.4 °F (36.9 °C) 84 18 132/69 96 %   06/30/21 0011 97.2 °F (36.2 °C) 85 20 113/64 95 %   06/29/21 2021 98 °F (36.7 °C) 84 20 132/72 96 %     Physical Exam  Constitutional:       General: She is not in acute distress. Appearance: She is obese. She is not ill-appearing, toxic-appearing or diaphoretic. HENT:      Mouth/Throat:      Mouth: Mucous membranes are moist.      Pharynx: No oropharyngeal exudate or posterior oropharyngeal erythema. Cardiovascular:      Rate and Rhythm: Normal rate and regular rhythm. Pulses: Normal pulses. Heart sounds: Normal heart sounds. No murmur heard. No friction rub. No gallop. Pulmonary:      Effort: Pulmonary effort is normal. No respiratory distress. Breath sounds: No wheezing, rhonchi or rales. Comments: Distant breath sounds. Chest:      Chest wall: No tenderness. Abdominal:      General: Abdomen is flat. Bowel sounds are normal. There is no distension. Palpations: Abdomen is soft. Tenderness: There is no abdominal tenderness. There is no guarding or rebound. Musculoskeletal:         General: No swelling, tenderness or deformity. Right lower leg: No edema. Left lower leg: No edema. Skin:     General: Skin is warm and dry. Capillary Refill: Capillary refill takes less than 2 seconds. Neurological:      Mental Status: She is oriented to person, place, and time. Cranial Nerves: No cranial nerve deficit. Motor: No weakness. Intake and Output:  Current Shift: No intake/output data recorded. Last three shifts: 06/28 1901 - 06/30 0700  In: 140 [P.O.:140]  Out: 4300 [Urine:4300]    Lab/Data Review:  Recent Results (from the past 12 hour(s))   GLUCOSE, POC    Collection Time: 06/29/21  8:57 PM   Result Value Ref Range    Glucose (POC) 286 (H) 70 - 907 mg/dL   METABOLIC PANEL, COMPREHENSIVE    Collection Time: 06/30/21  4:39 AM   Result Value Ref Range    Sodium 136 136 - 145 mmol/L    Potassium 4.1 3.5 - 5.5 mmol/L    Chloride 100 100 - 111 mmol/L    CO2 24 21 - 32 mmol/L    Anion gap 12 3.0 - 18 mmol/L    Glucose 329 (H) 74 - 99 mg/dL    BUN 24 (H) 7.0 - 18 MG/DL    Creatinine 1.29 0.6 - 1.3 MG/DL    BUN/Creatinine ratio 19 12 - 20      GFR est AA 51 (L) >60 ml/min/1.73m2    GFR est non-AA 42 (L) >60 ml/min/1.73m2    Calcium 9.3 8.5 - 10.1 MG/DL    Bilirubin, total PENDING MG/DL    ALT (SGPT) 33 13 - 56 U/L    AST (SGOT) 12 10 - 38 U/L    Alk.  phosphatase PENDING U/L    Protein, total PENDING g/dL    Albumin 3.4 3.4 - 5.0 g/dL    Globulin PENDING g/dL    A-G Ratio PENDING     CBC W/O DIFF    Collection Time: 06/30/21  4:39 AM   Result Value Ref Range    WBC 11.2 4.6 - 13.2 K/uL    RBC 3.99 (L) 4.20 - 5.30 M/uL    HGB 10.9 (L) 12.0 - 16.0 g/dL    HCT 34.4 (L) 35.0 - 45.0 %    MCV 86.2 74.0 - 97.0 FL    MCH 27.3 24.0 - 34.0 PG    MCHC 31.7 31.0 - 37.0 g/dL    RDW 14.0 11.6 - 14.5 %    PLATELET 750 293 - 492 K/uL    MPV 9.2 9.2 - 11.8 FL   GLUCOSE, POC    Collection Time: 06/30/21  6:16 AM   Result Value Ref Range    Glucose (POC) 295 (H) 70 - 110 mg/dL       RECENT RESULTS  MODALITY IMPRESSION   XR Results from [place] encounter on 06/28/21    XR CHEST SNGL V    Narrative  EXAM: XR CHEST SNGL V    INDICATION: 63 years Female. shortness of breath. ADDITIONAL HISTORY: None. TECHNIQUE: Frontal view of the chest.    COMPARISON: Chest radiograph 3/30/2021. FINDINGS:    Underpenetration limits assessment of the lung bases, left greater than right. The cardiac silhouette is within normal limits in size. Atherosclerotic  calcifications are noted in the aorta. Pulmonary vasculature appears within  normal limits. Streaky/patchy opacities at the bilateral lung bases, which are similar in  appearance to prior chest radiograph from 3/30/2021 and likely reflect  atelectasis. No definite evidence of pleural effusion or pneumothorax. No acute osseous abnormality appreciated. Impression  1. No radiographic evidence of acute cardiopulmonary process. 2.  Unchanged patchy bibasilar opacities, favor atelectasis. CT Results from [place] encounter on 11/02/20    CT LOW DOSE LUNG CANCER SCREENING    Narrative  CT CHEST LUNG SCREENING    INDICATION: Lung CT screening study requested as patient needs established age  criteria, smoking history requirements, or additional risk factor eligibility  requirements. CT screening study requested as patient meets established  criteria. 48 pack year smoking history. COPD. Technique: Low-dose computed tomography acquisition performed according to the  national comprehensive cancer network guidelines space on body mass index. Axial  raw images obtained. Reconstruction on lung windows and soft tissue windows with  additional coronal and sagittally reformatted series.  No intravenous contrast  administered for this exam.    COMPARISON: CT abdomen/pelvis 8/31/2020, CT chest 2/21/2018    FINDINGS:  Lungs: Right middle lobe atelectasis is similar to 8/31/2020, progressed from  2018. Subsegmental atelectasis in the lingula. Mild bronchial wall thickening. 3  mm right upper lobe nodule (4, 76), not definitely seen on 2018 study. Pleura: No effusion. Lymph Nodes: No lymphadenopathy. Cardiovascular: Moderate calcified atherosclerotic disease, including the  coronary arteries. Bones: Mild degenerative changes in the spine. No suspicious osseous lesions. No  acute osseous abnormality. Visualized upper abdomen is unremarkable. Impression  IMPRESSION:    1. Right upper lobe pulmonary nodule measuring 3 mm, not definitely seen on 2018  study. Lung-RADS 2 - Benign appearance or behavior. Management: Continue annual screening with low dose CT in 12 months. 2. Right middle lobe atelectasis is similar to 8/31/2020, progressed from 2018. 3. Mild bronchial wall thickening, likely reflecting history of COPD. MRI No results found for this or any previous visit. ULTRASOUND Results from [place] encounter on 09/28/20    US BREAST RT LIMITED=<3 QUAD    Impression  IMPRESSION:  No evidence for malignancy. Patient's palpable areas of concern at  the 12:30-1:00 right breast correlate with benign lipomas. BI-RADS CATEGORY: BI-RADS 2 - Benign    FOLLOW-UP RECOMMENDATIONS: Patient is encouraged to continue regular self  examinations, and should return to clinic should her examination or symptoms  change in any other concerning way. Otherwise, recommend routine follow-up with  annual bilateral screening mammography, for which patient will be due in  September 2021. Thank you for enabling us to participate in the care of this patient. Results from [place] encounter on 04/10/17    US ABD COMP    Impression  IMPRESSION:      1. Echogenic mildly enlarged liver, suggestive of hepatic steatosis. No focal  hepatic lesion identified. 2. Status post cholecystectomy. No biliary ductal dilatation. Cardiology Procedures/Testing:  MODALITY RESULTS   EKG Results for orders placed or performed during the hospital encounter of 06/28/21   EKG, 12 LEAD, INITIAL   Result Value Ref Range    Ventricular Rate 76 BPM    Atrial Rate 76 BPM    P-R Interval 152 ms    QRS Duration 130 ms    Q-T Interval 426 ms    QTC Calculation (Bezet) 479 ms    Calculated P Axis 68 degrees    Calculated R Axis 38 degrees    Calculated T Axis 19 degrees    Diagnosis       Normal sinus rhythm  Right bundle branch block  Abnormal ECG  When compared with ECG of 29-APR-2021 17:48,  No significant change was found  Confirmed by Delores Willoughby MD, ----- ([id number]) on 6/28/2021 5:03:51 PM         ECHO 03/22/21    ECHO ADULT COMPLETE 03/22/2021 3/22/2021    Interpretation Summary  · Image quality for this study was suboptimal. Contrast used: DEFINITY. · LV: Estimated LVEF is 60 - 65%. Normal cavity size and systolic function (ejection fraction normal). Mild concentric hypertrophy. Mild (grade 1) left ventricular diastolic dysfunction E/E' ratio is 14.15.  · LA: Mildly dilated left atrium. · RV: Mildly dilated right ventricle. · RA: Mildly dilated right atrium. · PA: Pulmonary arterial systolic pressure is 27 mmHg. Pulmonary hypertension not suggested by Doppler findings. · Pericardium: Pericardial fat pad present. Signed by:  Madyson Muller MD on 3/22/2021  8:06 PM       Special Testing/Procedures:  MODALITY RESULTS   MICRO All Micro Results     Procedure Component Value Units Date/Time    RESPIRATORY VIRUS PANEL W/COVID-19, PCR [[id number]] Collected: 06/28/21 1725    Order Status: Completed Specimen: Nasopharyngeal Updated: 06/28/21 1911     Adenovirus Not detected        Coronavirus 229E Not detected        Coronavirus HKU1 Not detected        Coronavirus CVNL63 Not detected        Coronavirus OC43 Not detected        SARS-CoV-2, PCR Not detected        Metapneumovirus Not detected        Rhinovirus and Enterovirus Not detected        Influenza A Not detected        Influenza A, subtype H1 Not detected        Influenza A, subtype H3 Not detected        INFLUENZA A H1N1 PCR Not detected        Influenza B Not detected        Parainfluenza 1 Not detected        Parainfluenza 2 Not detected        Parainfluenza 3 Not detected        Parainfluenza virus 4 Not detected        RSV by PCR Not detected        B. parapertussis, PCR Not detected        Bordetella pertussis - PCR Not detected        Chlamydophila pneumoniae DNA, QL, PCR Not detected        Mycoplasma pneumoniae DNA, QL, PCR Not detected            UA No results found for this or any previous visit. Telemetry YES   Oxygen 2L NC     Assessment and Plan:     63 y.o. female with PMH COPD on home O2 (3L NC), IDDM2, HTN, HFpEF, SHERRIE on CPAP, GERD, allergic rhinitis now admitted with COPD excerbation    COPD exacerbation (improving), Chronic Hypoxic Respiratory failure, Severe persistent asthma with steroid dependence: SOB for 1-week with no improvement on outpatient therapy. DDx for SOB include COPD exacerbation vs. CHF exacerbation vs. Infectious (PNA vs. URI). Currently on Fasenra 30mg SQH every 8 weeks, last injection 6/22/2021 with improvement in COPD symptoms since that time.  Followed by pulm (Dr. Xochilt Simon) out patient. CXR: acute cardiopulmonary process, unchanged patchy bibasilar opacities, favor atelectasis. Respiratory panel negative. Procalcitonin <0.05 which supports a non-infectious etiology.   - Admit to telemetry  - discontinue IV Solumedrol 40mg  - start PO prednisone 40mg daily  - discontinue Levaquin 750mg IV  - Start brovana and pulmicort nebulizers  - increase scheduling of duonebs (q4h)  - duonebs q4 hours prn  - continue home Spiriva daily   - incentive spirometer  - albuterol nebs prn   - continue home azithromycin M, W, F  - supplemental oxygen to maintain SpO2 88-92%  - BIPAP at night  - Pulm consulted, appreciate recs  - Vital signs per unit routine  - Monitor I/Os  - Ambulate as tolerated  - Daily CBC, BMP  - Lovenox 40 mg for DVT prophylaxis   - Replete electrolytes daily  - PT/OT/CM     Obesity hypoventilation syndrome with SHERRIE: Patient on trilogy    Morbid obesity: Body mass index is 46.23 kg/m². HFpEF: ECHO (5/24/20) PP 49 - 70%. No regional wall motion abnormality. Moderate (grade 2) left ventricular diastolic dysfunction. Mildly dilated left atrium.  Pulmonary arterial systolic pressure is 61 mmHg. Troponins negative on admission. Pro-BNP not elevated (62). EKG NSR, RBBB. Echo (6/29) EF is 55 - 60%. Mild concentric hypertrophy. Pulmonary arterial systolic pressure 51 mmHg. - Continue Lasix 40mg daily     Insulin dependent Type 2 Diabetes: Glucose on admission 193. HbA1c 7.9 on admission.  Chronic use of steroids can contribute to her hyperglycemia.   -Continue home Lantus at 50 U  -SSI  (insulin resistant scale)   - add Lantus 5 units AM, titrate as needed with as steroids tapered down   - add Lispro 3 units TIDAC   -Accuchecks   -Diabetic diet  -Diabetic educator consult     Hypertension: /86  - Continue Lisinopril 20mg daily at noon     GERD: Currently asymptomatic   - substitute home ompeprazole with protonix 40mg daily while inpatient      SHERRIE  - BIPAP at night, continue in-patient    [employer]:  - headache - scheduled tylenol 1000mg q8h   - PT/OT     - case management   Diet  Diabetic   DVT Prophylaxis  Lovenox   Code status  Full   Disposition  < 2MN      Point of Contact Robertha Goldmann  Relationship: Daughter  [phone]      John Zuleta MD, PGY1   [facility]   Intern Pager: [phone]   June 30, 2021, 7:08 AM

## 2021-06-29 NOTE — ED NOTES
Report included the following information SBAR, Kardex, MAR and Recent Results. SITUATION:    Code Status: Full Code   Reason for Admission: COPD exacerbation ([facility] [address] [J44.1]    [facility] day: 0   Problem List:       Hospital Problems  Date Reviewed: 8/19/2020        Codes Class Noted POA    * (Principal) COPD exacerbation ([facility] [address] ICD-10-CM: J44.1  ICD-9-CM: 491.21  2/8/2017 Unknown              BACKGROUND:    Past Medical History:   Past Medical History:   Diagnosis Date    Asthma     Chronic lung disease     COPD     Cystocele, midline     Diabetes mellitus ([facility] [address]     GERD (gastroesophageal reflux disease)     Hidradenitis suppurativa     Hyperlipidemia     Hypertension     SHERRIE on CPAP     CPAP    Stress incontinence          Patient taking anticoagulants no     ASSESSMENT:    Changes in Assessment Throughout Shift: progression of care     Patient has Central Line: no Reasons if yes: N/A   Patient has Dean Cath: no Reasons if yes: N/A      Last Vitals:     Vitals:    06/28/21 1024 06/28/21 1557 06/28/21 1945 06/28/21 2000   BP: (!) 183/94 (!) 170/86 (!) 166/81 (!) 181/70   Pulse: 81 84 90 92   Resp: 16 18 25 23   Temp: 97.6 °F (36.4 °C)   97.6 °F (36.4 °C)   SpO2: 98% 98% 96% 97%   Weight: 118.4 kg (261 lb)      Height: 5' 3\" (1.6 m)           IV and DRAINS (will only show if present)         WOUND (if present)   Wound Type:  none   Dressing present     Wound Concerns/Notes:  none     PAIN    Pain Assessment    Pain Intensity 1: 7 (06/28/21 1024)    Pain Location 1: Chest            o Interventions for Pain:  none  o Intervention effective: Unknown  o Time of last intervention: unknown   o Reassessment Completed: unknown      Last 3 Weights:  Last 3 Recorded Weights in this Encounter    06/28/21 1024   Weight: 118.4 kg (261 lb)     Weight change:      INTAKE/OUPUT    Current Shift: No intake/output data recorded.     Last three shifts: 06/27 0701 - 06/28 1900  In: 200 [I.V.:200]  Out: -  LAB RESULTS     Recent Labs     06/28/21  1040   WBC 4.4*   HGB 12.6   HCT 39.2           Recent Labs     06/28/21  1040      K 4.3   *   BUN 13   CREA 0.92   CA 9.3       RECOMMENDATIONS AND DISCHARGE PLANNING     1. Pending tests/procedures/ Plan of Care or Other Needs: Progression of care      2. Discharge plan for patient and Needs/Barriers: unknown    3. Estimated Discharge Date: unknown Posted on Whiteboard in Patients Room: no and unknown      4. The patient's care plan was reviewed with the oncoming nurse. \"HEALS\" SAFETY CHECK      Fall Risk         Safety Measures:      A safety check occurred in the patient's room between off going nurse and oncoming nurse listed above. The safety check included the below items  Area Items   H  High Alert Medications - Verify all high alert medication drips (heparin, PCA, etc.)   E  Equipment - Suction is set up for ALL patients (with mario)  - Red plugs utilized for all equipment (IV pumps, etc.)  - WOWs wiped down at end of shift.  - Room stocked with oxygen, suction, and other unit-specific supplies   A  Alarms - Bed alarm is set for fall risk patients  - Ensure chair alarm is in place and activated if patient is up in a chair   L  Lines - Check IV for any infiltration  - Dean bag is empty if patient has a Dean   - Tubing and IV bags are labeled   S  Safety   - Room is clean, patient is clean, and equipment is clean. - Hallways are clear from equipment besides carts. - Fall bracelet on for fall risk patients  - Ensure room is clear and free of clutter  - Suction is set up for ALL patients (with mario)  - Hallways are clear from equipment besides carts.    - Isolation precautions followed, supplies available outside room, sign posted     [facility] President

## 2021-06-29 NOTE — PROGRESS NOTES
Patient placed on SBT. RR increased to 40 and HR increased. Patient placed back on previous vent settings. Will continue to monitor.

## 2021-06-29 NOTE — PROGRESS NOTES
[facility] Family Medicine  Progress Note    Patient: Holly Wylie MRN: [id number]   SSN: [id number]  YOB: 1960   Age: 63 y.o. Sex: female      Admit Date: 6/28/2021    LOS: 1 day   Chief Complaint   Patient presents with    Shortness of Breath       Subjective:     Overnight, BiPAP ordered for sleep. Patient seen at bedside and continues to endorse SOB,cough and. She complains of headache. Persistent need for supplemental oxygen at home O2 requirements (2L NC). Review of Systems   Constitutional: Positive for malaise/fatigue. Negative for chills and fever. Respiratory: Positive for cough, shortness of breath and wheezing. Cardiovascular: Negative for chest pain. Gastrointestinal: Negative for abdominal pain. Neurological: Positive for headaches. Objective:     Patient Vitals for the past 12 hrs:   Temp Pulse Resp BP SpO2   06/29/21 0400 97.5 °F (36.4 °C) 84 20 130/66 95 %   06/29/21 0041 -- -- -- -- 99 %   06/29/21 0039 -- -- -- -- 99 %   06/29/21 0000 97.3 °F (36.3 °C) 88 19 (!) 158/68 93 %   06/28/21 2130 97.9 °F (36.6 °C) 89 20 (!) 156/72 96 %   06/28/21 2000 97.6 °F (36.4 °C) 92 23 (!) 181/70 97 %     Physical Exam  Constitutional:       General: She is not in acute distress. Appearance: She is obese. She is not ill-appearing, toxic-appearing or diaphoretic. HENT:      Mouth/Throat:      Mouth: Mucous membranes are moist.      Pharynx: No oropharyngeal exudate or posterior oropharyngeal erythema. Cardiovascular:      Rate and Rhythm: Normal rate and regular rhythm. Pulses: Normal pulses. Heart sounds: Normal heart sounds. No murmur heard. No friction rub. No gallop. Pulmonary:      Effort: Pulmonary effort is normal. No respiratory distress. Breath sounds: Wheezing present. No rhonchi or rales. Abdominal:      General: Abdomen is flat. Bowel sounds are normal. There is no distension. Palpations: Abdomen is soft.       Tenderness: There is no abdominal tenderness. There is no guarding or rebound. Musculoskeletal:         General: No swelling, tenderness or deformity. Right lower leg: Edema present. Left lower leg: Edema present. Comments: 1+ pitting edema bilateral LE   Skin:     General: Skin is warm and dry. Capillary Refill: Capillary refill takes less than 2 seconds. Neurological:      Mental Status: She is oriented to person, place, and time. Cranial Nerves: No cranial nerve deficit. Motor: No weakness. Intake and Output:  Current Shift: No intake/output data recorded. Last three shifts: 06/27 1901 - 06/29 0700  In: 200 [I.V.:200]  Out: -     Lab/Data Review:  Recent Results (from the past 12 hour(s))   GLUCOSE, POC    Collection Time: 06/28/21  9:33 PM   Result Value Ref Range    Glucose (POC) 301 (H) 70 - 110 mg/dL   GLUCOSE, POC    Collection Time: 06/28/21  9:33 PM   Result Value Ref Range    Glucose (POC) 285 (H) 70 - 110 mg/dL   GLUCOSE, POC    Collection Time: 06/28/21 11:22 PM   Result Value Ref Range    Glucose (POC) 298 (H) 70 - 963 mg/dL   METABOLIC PANEL, COMPREHENSIVE    Collection Time: 06/29/21  1:02 AM   Result Value Ref Range    Sodium 135 (L) 136 - 145 mmol/L    Potassium 4.3 3.5 - 5.5 mmol/L    Chloride 100 100 - 111 mmol/L    CO2 25 21 - 32 mmol/L    Anion gap 10 3.0 - 18 mmol/L    Glucose 282 (H) 74 - 99 mg/dL    BUN 16 7.0 - 18 MG/DL    Creatinine 1.06 0.6 - 1.3 MG/DL    BUN/Creatinine ratio 15 12 - 20      GFR est AA >60 >60 ml/min/1.73m2    GFR est non-AA 53 (L) >60 ml/min/1.73m2    Calcium 9.6 8.5 - 10.1 MG/DL    Bilirubin, total 0.7 0.2 - 1.0 MG/DL    ALT (SGPT) 44 13 - 56 U/L    AST (SGOT) 27 10 - 38 U/L    Alk.  phosphatase 109 45 - 117 U/L    Protein, total 8.2 6.4 - 8.2 g/dL    Albumin 3.6 3.4 - 5.0 g/dL    Globulin 4.6 (H) 2.0 - 4.0 g/dL    A-G Ratio 0.8 0.8 - 1.7     CBC W/O DIFF    Collection Time: 06/29/21  1:02 AM   Result Value Ref Range    WBC 9.1 4.6 - 13.2 K/uL    RBC 4.49 4.20 - 5.30 M/uL    HGB 12.6 12.0 - 16.0 g/dL    HCT 38.4 35.0 - 45.0 %    MCV 85.5 74.0 - 97.0 FL    MCH 28.1 24.0 - 34.0 PG    MCHC 32.8 31.0 - 37.0 g/dL    RDW 13.9 11.6 - 14.5 %    PLATELET 126 502 - 861 K/uL    MPV 9.2 9.2 - 11.8 FL   MAGNESIUM    Collection Time: 06/29/21  1:02 AM   Result Value Ref Range    Magnesium 2.2 1.6 - 2.6 mg/dL   PHOSPHORUS    Collection Time: 06/29/21  1:02 AM   Result Value Ref Range    Phosphorus 2.3 (L) 2.5 - 4.9 MG/DL   GLUCOSE, POC    Collection Time: 06/29/21  7:05 AM   Result Value Ref Range    Glucose (POC) 215 (H) 70 - 110 mg/dL       RECENT RESULTS  MODALITY IMPRESSION   XR Results from Hospital Encounter encounter on 06/28/21    XR CHEST SNGL V    Narrative  EXAM: XR CHEST SNGL V    INDICATION: 63 years Female. shortness of breath. ADDITIONAL HISTORY: None. TECHNIQUE: Frontal view of the chest.    COMPARISON: Chest radiograph 3/30/2021. FINDINGS:    Underpenetration limits assessment of the lung bases, left greater than right. The cardiac silhouette is within normal limits in size. Atherosclerotic  calcifications are noted in the aorta. Pulmonary vasculature appears within  normal limits. Streaky/patchy opacities at the bilateral lung bases, which are similar in  appearance to prior chest radiograph from 3/30/2021 and likely reflect  atelectasis. No definite evidence of pleural effusion or pneumothorax. No acute osseous abnormality appreciated. Impression  1. No radiographic evidence of acute cardiopulmonary process. 2.  Unchanged patchy bibasilar opacities, favor atelectasis. CT Results from [place] encounter on 11/02/20    CT LOW DOSE LUNG CANCER SCREENING    Narrative  CT CHEST LUNG SCREENING    INDICATION: Lung CT screening study requested as patient needs established age  criteria, smoking history requirements, or additional risk factor eligibility  requirements.  CT screening study requested as patient meets established  criteria. 48 pack year smoking history. COPD. Technique: Low-dose computed tomography acquisition performed according to the  national comprehensive cancer network guidelines space on body mass index. Axial  raw images obtained. Reconstruction on lung windows and soft tissue windows with  additional coronal and sagittally reformatted series. No intravenous contrast  administered for this exam.    COMPARISON: CT abdomen/pelvis 8/31/2020, CT chest 2/21/2018    FINDINGS:  Lungs: Right middle lobe atelectasis is similar to 8/31/2020, progressed from  2018. Subsegmental atelectasis in the lingula. Mild bronchial wall thickening. 3  mm right upper lobe nodule (4, 76), not definitely seen on 2018 study. Pleura: No effusion. Lymph Nodes: No lymphadenopathy. Cardiovascular: Moderate calcified atherosclerotic disease, including the  coronary arteries. Bones: Mild degenerative changes in the spine. No suspicious osseous lesions. No  acute osseous abnormality. Visualized upper abdomen is unremarkable. Impression  IMPRESSION:    1. Right upper lobe pulmonary nodule measuring 3 mm, not definitely seen on 2018  study. Lung-RADS 2 - Benign appearance or behavior. Management: Continue annual screening with low dose CT in 12 months. 2. Right middle lobe atelectasis is similar to 8/31/2020, progressed from 2018. 3. Mild bronchial wall thickening, likely reflecting history of COPD. MRI No results found for this or any previous visit. ULTRASOUND Results from [place] encounter on 09/28/20    US BREAST RT LIMITED=<3 QUAD    Impression  IMPRESSION:  No evidence for malignancy. Patient's palpable areas of concern at  the 12:30-1:00 right breast correlate with benign lipomas.     BI-RADS CATEGORY: BI-RADS 2 - Benign    FOLLOW-UP RECOMMENDATIONS: Patient is encouraged to continue regular self  examinations, and should return to clinic should her examination or symptoms  change in any other concerning way. Otherwise, recommend routine follow-up with  annual bilateral screening mammography, for which patient will be due in  September 2021. Thank you for enabling us to participate in the care of this patient. Results from [place] encounter on 04/10/17    US ABD COMP    Impression  IMPRESSION:      1. Echogenic mildly enlarged liver, suggestive of hepatic steatosis. No focal  hepatic lesion identified. 2. Status post cholecystectomy. No biliary ductal dilatation. Cardiology Procedures/Testing:  MODALITY RESULTS   EKG Results for orders placed or performed during the hospital encounter of 06/28/21   EKG, 12 LEAD, INITIAL   Result Value Ref Range    Ventricular Rate 76 BPM    Atrial Rate 76 BPM    P-R Interval 152 ms    QRS Duration 130 ms    Q-T Interval 426 ms    QTC Calculation (Bezet) 479 ms    Calculated P Axis 68 degrees    Calculated R Axis 38 degrees    Calculated T Axis 19 degrees    Diagnosis       Normal sinus rhythm  Right bundle branch block  Abnormal ECG  When compared with ECG of 29-APR-2021 17:48,  No significant change was found  Confirmed by Milana Grove MD, ----- ([id number]) on 6/28/2021 5:03:51 PM         ECHO 03/22/21    ECHO ADULT COMPLETE 03/22/2021 3/22/2021    Interpretation Summary  · Image quality for this study was suboptimal. Contrast used: DEFINITY. · LV: Estimated LVEF is 60 - 65%. Normal cavity size and systolic function (ejection fraction normal). Mild concentric hypertrophy. Mild (grade 1) left ventricular diastolic dysfunction E/E' ratio is 14.15.  · LA: Mildly dilated left atrium. · RV: Mildly dilated right ventricle. · RA: Mildly dilated right atrium. · PA: Pulmonary arterial systolic pressure is 27 mmHg. Pulmonary hypertension not suggested by Doppler findings. · Pericardium: Pericardial fat pad present. Signed by:  Fracisco Sadler MD on 3/22/2021  8:06 PM       Special Testing/Procedures:  MODALITY RESULTS   MICRO All Micro Results Procedure Component Value Units Date/Time    RESPIRATORY VIRUS PANEL W/COVID-19, PCR [[id number]] Collected: 06/28/21 1725    Order Status: Completed Specimen: Nasopharyngeal Updated: 06/28/21 1911     Adenovirus Not detected        Coronavirus 229E Not detected        Coronavirus HKU1 Not detected        Coronavirus CVNL63 Not detected        Coronavirus OC43 Not detected        SARS-CoV-2, PCR Not detected        Metapneumovirus Not detected        Rhinovirus and Enterovirus Not detected        Influenza A Not detected        Influenza A, subtype H1 Not detected        Influenza A, subtype H3 Not detected        INFLUENZA A H1N1 PCR Not detected        Influenza B Not detected        Parainfluenza 1 Not detected        Parainfluenza 2 Not detected        Parainfluenza 3 Not detected        Parainfluenza virus 4 Not detected        RSV by PCR Not detected        B. parapertussis, PCR Not detected        Bordetella pertussis - PCR Not detected        Chlamydophila pneumoniae DNA, QL, PCR Not detected        Mycoplasma pneumoniae DNA, QL, PCR Not detected            UA No results found for this or any previous visit. Telemetry YES   Oxygen 2L NC     Assessment and Plan:     63 y.o. female with PMH COPD on home O2 (3L NC), IDDM2, HTN, HFpEF, SHERRIE on CPAP, GERD, allergic rhinitis now admitted with COPD excerbation    COPD exacerbation: SOB for 1-week with no improvement on outpatient therapy. DDx for SOB include COPD exacerbation vs. CHF exacerbation vs. Infectious (PNA vs. URI). Currently on Fasenra 30mg SQH every 8 weeks, last injection 6/22/2021 with improvement in COPD symptoms since that time.  Followed by pulm (Dr. Janina Worley) out patient. CXR: acute cardiopulmonary process, unchanged patchy bibasilar opacities, favor atelectasis.  Respiratory panel negative  - Admit to telemetry  - IV Solumedrol 40mg  - Levaquin 750mg IV  - schedule duoneb treatment  - duonebs q6 hours prn  - continue Advair and Spiriva daily or appropriate substitution per pharmacy   - albuterol nebs prn   - continue home azithromycin M, W, F  - supplemental oxygen to maintain SpO2 88-92%  - Pulm consulted, appreciate recs  - Vital signs per unit routine  - Monitor I/Os  - Ambulate as tolerated  - Daily CBC, BMP  - Lovenox 40 mg for DVT prophylaxis   - Replete electrolytes daily  - PT/OT/CM     HFpEF: ECHO (5/24/20) WN 48 - 70%. No regional wall motion abnormality. Moderate (grade 2) left ventricular diastolic dysfunction. Mildly dilated left atrium.  Pulmonary arterial systolic pressure is 61 mmHg. Troponins negative on admission. Pro-BNP not elevated (62). EKG NSR, RBBB. - Continue Lasix 40mg daily  - FU Echo     Insulin dependent Type 2 Diabetes: Glucose on admission 193. HbA1c 7.9 on admission.  Chronic use of steroids can contribute to her hyperglycemia.   -Continue Lantus at 50 U  -SSI (change to insulin resistant scale)   -Accuchecks   -Diabetic diet  -Diabetic educator consult     Hypertension: /86  - Continue Lisinopril 20mg daily at noon     GERD: Currently asymptomatic   - substitute home ompeprazole with protonix 40mg daily while inpatient      SHERRIE  - BIPAP at night, continue in-patient    [employer]:  - headache - schedule tylenol TID   - PT/OT     - case management   Diet  Diabetic   DVT Prophylaxis  Lovenox   Code status  Full   Disposition  < 2MN      Point of Contact Morenolisa Stark  Relationship: Daughter  [phone]      Keyshawn Wagner MD, PGY1   [address]   Intern Pager: [phone]   June 29, 2021, 7:58 AM

## 2021-06-29 NOTE — PROGRESS NOTES
Physician Progress Note      Haris Morin  [facility] #:                  [id number]  :                       1960  ADMIT DATE:       2021 10:34 AM  DISCH DATE:  RESPONDING  PROVIDER #:        Orlin GRANDE MD          QUERY TEXT:    Pt admitted with COPD exacerbation . Pt noted to have SOB upon admission and uses supplemental oxygen at home. If possible, please document in the progress notes and discharge summary if you are evaluating and/or treating any of the following: The medical record reflects the following:  Risk Factors: 63 yoF with PMH COPD on 2 L home O2, asthma, HTN, HLD presenting with 1 week of SOB. Tightness of chest and difficulty breathing. Worsening cough productive of green sputum. Clinical Indicators: Per ED provider - Taking her duonebs q3h and will start to wheeze following administration, but then becomes \"too tight\" and stops wheezing until next dose. Typically on 2 L home O2, but has required 3 L for ambulation. Positive for cough, chest tightness, shortness of breath and wheezing. Per H&P - She has increased work of breathing with exertion and has required her home oxygen (2L) continuously. Endorses cough and sputum production. CXR: acute cardiopulmonary process, unchanged patchy bibasilar opacities, favor atelectasis.   RR on 2LPM NC - 16, 18, 25, 23, 20  Treatment: Supplemenetal O2, Bipap at night, 3x duonebs, 2 g magnesium, 125 mg solumedrol, and 750 mg IV levofloxacin    Thank you,  Kalie James RN, [facility]  [phone]  Options provided:  -- Acute respiratory failure with hypoxia  -- Acute respiratory failure with hypercapnia  -- Chronic respiratory failure with hypoxia  -- Chronic respiratory failure with hypercapnia  -- Acute on chronic respiratory failure with hypoxia  -- Acute on chronic respiratory failure with hypercapnia  -- Other - I will add my own diagnosis  -- Disagree - Not applicable / Not valid  -- Disagree - Clinically unable to determine / Unknown  -- Refer to Clinical Documentation Reviewer    PROVIDER RESPONSE TEXT:    Acute on chronic respiratory failure without hypercapnia    Query created by: Marbella Dawkins on 6/29/2021 12:17 PM      Electronically signed by:  Melinda GRANDE MD 6/29/2021 3:24 PM

## 2021-06-29 NOTE — PROGRESS NOTES
Problem: Diabetes Self-Management  Goal: *Disease process and treatment process  Description: Define diabetes and identify own type of diabetes; list 3 options for treating diabetes. Outcome: Progressing Towards Goal  Goal: *Incorporating nutritional management into lifestyle  Description: Describe effect of type, amount and timing of food on blood glucose; list 3 methods for planning meals. Outcome: Progressing Towards Goal  Goal: *Incorporating physical activity into lifestyle  Description: State effect of exercise on blood glucose levels. Outcome: Progressing Towards Goal  Goal: *Developing strategies to promote health/change behavior  Description: Define the ABC's of diabetes; identify appropriate screenings, schedule and personal plan for screenings. Outcome: Progressing Towards Goal  Goal: *Using medications safely  Description: State effect of diabetes medications on diabetes; name diabetes medication taking, action and side effects. Outcome: Progressing Towards Goal  Goal: *Monitoring blood glucose, interpreting and using results  Description: Identify recommended blood glucose targets  and personal targets. Outcome: Progressing Towards Goal  Goal: *Prevention, detection, treatment of acute complications  Description: List symptoms of hyper- and hypoglycemia; describe how to treat low blood sugar and actions for lowering  high blood glucose level. Outcome: Progressing Towards Goal  Goal: *Prevention, detection and treatment of chronic complications  Description: Define the natural course of diabetes and describe the relationship of blood glucose levels to long term complications of diabetes.   Outcome: Progressing Towards Goal  Goal: *Developing strategies to address psychosocial issues  Description: Describe feelings about living with diabetes; identify support needed and support network  Outcome: Progressing Towards Goal  Goal: *Insulin pump training  Outcome: Progressing Towards Goal  Goal: *Sick day guidelines  Outcome: Progressing Towards Goal  Goal: *Patient Specific Goal (EDIT GOAL, INSERT TEXT)  Outcome: Progressing Towards Goal     Problem: Falls - Risk of  Goal: *Absence of Falls  Description: Document Ricarda Fall Risk and appropriate interventions in the flowsheet. Outcome: Progressing Towards Goal  Note: Fall Risk Interventions:            Medication Interventions: Teach patient to arise slowly, Patient to call before getting OOB, Evaluate medications/consider consulting pharmacy, Bed/chair exit alarm    Elimination Interventions: Toileting schedule/hourly rounds, Toilet paper/wipes in reach, Patient to call for help with toileting needs, Call light in reach, Bed/chair exit alarm              Problem: Pain  Goal: *Control of Pain  Outcome: Progressing Towards Goal     Problem: Pressure Injury - Risk of  Goal: *Prevention of pressure injury  Description: Document Viktor Scale and appropriate interventions in the flowsheet. Outcome: Progressing Towards Goal  Note: Pressure Injury Interventions:             Activity Interventions: Increase time out of bed, Pressure redistribution bed/mattress(bed type)         Nutrition Interventions: Document food/fluid/supplement intake, Offer support with meals,snacks and hydration                     Problem: Injury - Risk of, Adverse Drug Event  Goal: *Absence of adverse drug events  Outcome: Progressing Towards Goal  Goal: *Absence of medication errors  Outcome: Progressing Towards Goal  Goal: *Knowledge of prescribed medications  Outcome: Progressing Towards Goal

## 2021-06-29 NOTE — DIABETES MGMT
Diabetes Plan of Care    Recommend adding mealtime lispro 3 units -     Assessment: follow up today  Observed consuming 100% of lunch - reviewed her recent BG and insulin doses -   She takes higher doses of mealtime coverage at home - recommend adding mealtime lispro  May also benefit from additional dose of lantus at night while receiving IV steroids -    mg/dl this am   Continues with 40 mg solu-medrol every 8 hours  Will follow     Most recent blood glucose values:       Within target range     No    Current A1c  Lab Results   Component Value Date/Time    Hemoglobin A1c 7.9 (H) 06/28/2021 10:40 AM     Adequate glycemic control PTA:   No     Current hospital diabetes medications:  Lantus 50 units nightly  Corrective lispro, very insulin resistant, 4 times daily    Diet -     TDD previous day = 56 units   50 units lantus  6 units lispro    Home diabetes medications;  lantus 50 units daily  humalog sliding scale with meals      Goals: Blood glucose will be within target of 70 - 180 mg/dl by - 7/2/2021      Education:  __X__ Refer to Diabetes Education Record                       _____ Education not indicated at this time     Romain Landeros MPH RN [facility]  Pager [phone]  Office [phone]

## 2021-06-29 NOTE — ROUTINE PROCESS
Bedside and Verbal shift change report given to Kimberly RN (oncoming nurse) by Mika Siu RN (offgoing nurse). Report included the following information SBAR, Procedure Summary, MAR and Recent Results.

## 2021-06-29 NOTE — PROGRESS NOTES
Reason for Admission:  COPD exacerbation ([facility] [address] [J44.1]                 RUR Score:    27            Plan for utilizing home health:    yes                      Likelihood of Readmission:   Moderate                         Do you (patient/family) have any concerns for transition/discharge?  no    Transition of Care Plan:       Initial assessment completed with patient. Cognitive status of patient: oriented to time, place, person and situation. Face sheet information confirmed:  yes. The patient designates daughter to participate in her discharge plan and to receive any needed information. This patient lives in a single family home with patient and daughter. Patient is not able to navigate steps as needed. Prior to hospitalization, patient was considered to be independent with ADLs/IADLS : no . If not independent,  patient needs assist with : dressing, bathing, food preparation, cooking, toileting and grooming. Patient has personal care 8 hours a day M-F from [facility]. Patient has a current ACP document on file: no      Healthcare Decision Maker:   Primary Decision Maker: Caryn Curran - [phone]    Secondary Decision Maker: Uma Koehler   - [phone]    Click here to complete [address] including selection of the Healthcare Decision Maker Relationship (ie \"Primary\")    The patient and daughter will be available to transport patient home upon discharge. The patient already has Arlean Schilder, BSC, and Oxygen 2L tanks and concentrator and Nebulizer from [facility],  medical equipment available in the home. Patient is not currently active with home health. Patient has not stayed in a skilled nursing facility or rehab. This patient is on dialysis :no    List of available Home Health agencies were provided and reviewed with the patient prior to discharge. Freedom of choice signed: yes, for [facility].  Currently, the discharge plan is Home with Home Health. The patient states that she can obtain her medications from the pharmacy, and take her medications as directed. Patient's current insurance is [employer] and Medicaid      Care Management Interventions  PCP Verified by CM:  Yes  Mode of Transport at Discharge: Self  Current Support Network: [address] Follow Up Transport: Family  The Plan for Transition of Care is Related to the Following Treatment Goals : Home health  The Patient and/or Patient Representative was Provided with a Choice of Provider and Agrees with the Discharge Plan?: Yes  Freedom of Choice List was Provided with Basic Dialogue that Supports the Patient's Individualized Plan of Care/Goals, Treatment Preferences and Shares the Quality Data Associated with the Providers?: Yes  Discharge Location  Discharge Placement: Home with home health        William Adkins RN BSN  Care Manager  [phone]

## 2021-06-30 LAB
ALBUMIN SERPL-MCNC: 3.4 G/DL (ref 3.4–5)
ALBUMIN/GLOB SERPL: 0.9 {RATIO} (ref 0.8–1.7)
ALP SERPL-CCNC: 87 U/L (ref 45–117)
ALT SERPL-CCNC: 33 U/L (ref 13–56)
ANION GAP SERPL CALC-SCNC: 12 MMOL/L (ref 3–18)
AST SERPL-CCNC: 12 U/L (ref 10–38)
BILIRUB SERPL-MCNC: 0.9 MG/DL (ref 0.2–1)
BUN SERPL-MCNC: 24 MG/DL (ref 7–18)
BUN/CREAT SERPL: 19 (ref 12–20)
CALCIUM SERPL-MCNC: 9.3 MG/DL (ref 8.5–10.1)
CHLORIDE SERPL-SCNC: 100 MMOL/L (ref 100–111)
CO2 SERPL-SCNC: 24 MMOL/L (ref 21–32)
CREAT SERPL-MCNC: 1.29 MG/DL (ref 0.6–1.3)
ERYTHROCYTE [DISTWIDTH] IN BLOOD BY AUTOMATED COUNT: 14 % (ref 11.6–14.5)
GLOBULIN SER CALC-MCNC: 3.8 G/DL (ref 2–4)
GLUCOSE BLD STRIP.AUTO-MCNC: 250 MG/DL (ref 70–110)
GLUCOSE BLD STRIP.AUTO-MCNC: 295 MG/DL (ref 70–110)
GLUCOSE BLD STRIP.AUTO-MCNC: 306 MG/DL (ref 70–110)
GLUCOSE BLD STRIP.AUTO-MCNC: 336 MG/DL (ref 70–110)
GLUCOSE SERPL-MCNC: 329 MG/DL (ref 74–99)
HCT VFR BLD AUTO: 34.4 % (ref 35–45)
HGB BLD-MCNC: 10.9 G/DL (ref 12–16)
MCH RBC QN AUTO: 27.3 PG (ref 24–34)
MCHC RBC AUTO-ENTMCNC: 31.7 G/DL (ref 31–37)
MCV RBC AUTO: 86.2 FL (ref 74–97)
PLATELET # BLD AUTO: 306 K/UL (ref 135–420)
PMV BLD AUTO: 9.2 FL (ref 9.2–11.8)
POTASSIUM SERPL-SCNC: 4.1 MMOL/L (ref 3.5–5.5)
PROT SERPL-MCNC: 7.2 G/DL (ref 6.4–8.2)
RBC # BLD AUTO: 3.99 M/UL (ref 4.2–5.3)
SODIUM SERPL-SCNC: 136 MMOL/L (ref 136–145)
WBC # BLD AUTO: 11.2 K/UL (ref 4.6–13.2)

## 2021-06-30 PROCEDURE — 97162 PT EVAL MOD COMPLEX 30 MIN: CPT

## 2021-06-30 PROCEDURE — 74011250637 HC RX REV CODE- 250/637: Performed by: FAMILY MEDICINE

## 2021-06-30 PROCEDURE — 97535 SELF CARE MNGMENT TRAINING: CPT

## 2021-06-30 PROCEDURE — 36415 COLL VENOUS BLD VENIPUNCTURE: CPT

## 2021-06-30 PROCEDURE — 80053 COMPREHEN METABOLIC PANEL: CPT

## 2021-06-30 PROCEDURE — 65660000000 HC RM CCU STEPDOWN

## 2021-06-30 PROCEDURE — 82962 GLUCOSE BLOOD TEST: CPT

## 2021-06-30 PROCEDURE — 2709999900 HC NON-CHARGEABLE SUPPLY

## 2021-06-30 PROCEDURE — 74011250636 HC RX REV CODE- 250/636: Performed by: FAMILY MEDICINE

## 2021-06-30 PROCEDURE — 85027 COMPLETE CBC AUTOMATED: CPT

## 2021-06-30 PROCEDURE — 74011636637 HC RX REV CODE- 636/637: Performed by: FAMILY MEDICINE

## 2021-06-30 PROCEDURE — 94660 CPAP INITIATION&MGMT: CPT

## 2021-06-30 PROCEDURE — 74011000250 HC RX REV CODE- 250: Performed by: INTERNAL MEDICINE

## 2021-06-30 PROCEDURE — 74011000250 HC RX REV CODE- 250: Performed by: FAMILY MEDICINE

## 2021-06-30 PROCEDURE — 74011636637 HC RX REV CODE- 636/637: Performed by: INTERNAL MEDICINE

## 2021-06-30 PROCEDURE — 97165 OT EVAL LOW COMPLEX 30 MIN: CPT

## 2021-06-30 PROCEDURE — 99233 SBSQ HOSP IP/OBS HIGH 50: CPT | Performed by: INTERNAL MEDICINE

## 2021-06-30 PROCEDURE — 94640 AIRWAY INHALATION TREATMENT: CPT

## 2021-06-30 RX ORDER — INSULIN GLARGINE 100 [IU]/ML
5 INJECTION, SOLUTION SUBCUTANEOUS EVERY MORNING
Status: DISCONTINUED | OUTPATIENT
Start: 2021-06-30 | End: 2021-07-01 | Stop reason: HOSPADM

## 2021-06-30 RX ORDER — PREDNISONE 20 MG/1
40 TABLET ORAL
Status: DISCONTINUED | OUTPATIENT
Start: 2021-06-30 | End: 2021-07-01 | Stop reason: HOSPADM

## 2021-06-30 RX ORDER — ACETAMINOPHEN 500 MG
1000 TABLET ORAL EVERY 8 HOURS
Status: DISCONTINUED | OUTPATIENT
Start: 2021-06-30 | End: 2021-07-01 | Stop reason: HOSPADM

## 2021-06-30 RX ORDER — PREDNISONE 20 MG/1
40 TABLET ORAL
Status: DISCONTINUED | OUTPATIENT
Start: 2021-07-01 | End: 2021-06-30

## 2021-06-30 RX ORDER — IPRATROPIUM BROMIDE AND ALBUTEROL SULFATE 2.5; .5 MG/3ML; MG/3ML
3 SOLUTION RESPIRATORY (INHALATION)
Status: DISCONTINUED | OUTPATIENT
Start: 2021-06-30 | End: 2021-07-01 | Stop reason: HOSPADM

## 2021-06-30 RX ORDER — INSULIN LISPRO 100 [IU]/ML
3 INJECTION, SOLUTION INTRAVENOUS; SUBCUTANEOUS
Status: DISCONTINUED | OUTPATIENT
Start: 2021-06-30 | End: 2021-07-01 | Stop reason: HOSPADM

## 2021-06-30 RX ADMIN — INSULIN LISPRO 3 UNITS: 100 INJECTION, SOLUTION INTRAVENOUS; SUBCUTANEOUS at 08:15

## 2021-06-30 RX ADMIN — PANTOPRAZOLE SODIUM 40 MG: 40 TABLET, DELAYED RELEASE ORAL at 08:16

## 2021-06-30 RX ADMIN — BUDESONIDE 1000 MCG: 1 SUSPENSION RESPIRATORY (INHALATION) at 08:24

## 2021-06-30 RX ADMIN — FUROSEMIDE 40 MG: 40 TABLET ORAL at 18:04

## 2021-06-30 RX ADMIN — Medication 10 ML: at 06:12

## 2021-06-30 RX ADMIN — ARFORMOTEROL TARTRATE 15 MCG: 15 SOLUTION RESPIRATORY (INHALATION) at 08:24

## 2021-06-30 RX ADMIN — INSULIN LISPRO 3 UNITS: 100 INJECTION, SOLUTION INTRAVENOUS; SUBCUTANEOUS at 16:30

## 2021-06-30 RX ADMIN — AZITHROMYCIN MONOHYDRATE 250 MG: 250 TABLET ORAL at 21:34

## 2021-06-30 RX ADMIN — INSULIN GLARGINE 50 UNITS: 100 INJECTION, SOLUTION SUBCUTANEOUS at 21:35

## 2021-06-30 RX ADMIN — INSULIN LISPRO 9 UNITS: 100 INJECTION, SOLUTION INTRAVENOUS; SUBCUTANEOUS at 21:34

## 2021-06-30 RX ADMIN — ACETAMINOPHEN 1000 MG: 500 TABLET, FILM COATED ORAL at 06:12

## 2021-06-30 RX ADMIN — Medication 10 ML: at 16:35

## 2021-06-30 RX ADMIN — ENOXAPARIN SODIUM 40 MG: 40 INJECTION SUBCUTANEOUS at 08:17

## 2021-06-30 RX ADMIN — IPRATROPIUM BROMIDE AND ALBUTEROL SULFATE 3 ML: .5; 3 SOLUTION RESPIRATORY (INHALATION) at 08:24

## 2021-06-30 RX ADMIN — LISINOPRIL 20 MG: 20 TABLET ORAL at 13:23

## 2021-06-30 RX ADMIN — ACETAMINOPHEN 1000 MG: 500 TABLET, FILM COATED ORAL at 13:24

## 2021-06-30 RX ADMIN — Medication 10 ML: at 21:35

## 2021-06-30 RX ADMIN — ARFORMOTEROL TARTRATE 15 MCG: 15 SOLUTION RESPIRATORY (INHALATION) at 19:03

## 2021-06-30 RX ADMIN — Medication 81 MG: at 08:15

## 2021-06-30 RX ADMIN — INSULIN LISPRO 9 UNITS: 100 INJECTION, SOLUTION INTRAVENOUS; SUBCUTANEOUS at 08:14

## 2021-06-30 RX ADMIN — INSULIN GLARGINE 5 UNITS: 100 INJECTION, SOLUTION SUBCUTANEOUS at 13:23

## 2021-06-30 RX ADMIN — METHYLPREDNISOLONE SODIUM SUCCINATE 40 MG: 40 INJECTION, POWDER, FOR SOLUTION INTRAMUSCULAR; INTRAVENOUS at 06:12

## 2021-06-30 RX ADMIN — INSULIN LISPRO 12 UNITS: 100 INJECTION, SOLUTION INTRAVENOUS; SUBCUTANEOUS at 16:29

## 2021-06-30 RX ADMIN — ACETAMINOPHEN 1000 MG: 500 TABLET, FILM COATED ORAL at 21:34

## 2021-06-30 RX ADMIN — PREDNISONE 40 MG: 20 TABLET ORAL at 16:30

## 2021-06-30 RX ADMIN — BUDESONIDE 1000 MCG: 1 SUSPENSION RESPIRATORY (INHALATION) at 19:03

## 2021-06-30 RX ADMIN — IPRATROPIUM BROMIDE AND ALBUTEROL SULFATE 3 ML: .5; 3 SOLUTION RESPIRATORY (INHALATION) at 19:03

## 2021-06-30 RX ADMIN — FUROSEMIDE 40 MG: 40 TABLET ORAL at 08:16

## 2021-06-30 RX ADMIN — INSULIN LISPRO 3 UNITS: 100 INJECTION, SOLUTION INTRAVENOUS; SUBCUTANEOUS at 13:23

## 2021-06-30 RX ADMIN — INSULIN LISPRO 12 UNITS: 100 INJECTION, SOLUTION INTRAVENOUS; SUBCUTANEOUS at 13:22

## 2021-06-30 RX ADMIN — IPRATROPIUM BROMIDE AND ALBUTEROL SULFATE 3 ML: .5; 3 SOLUTION RESPIRATORY (INHALATION) at 14:19

## 2021-06-30 NOTE — ROUTINE PROCESS
Bedside and Verbal shift change report given to Kimberly RN (oncoming nurse) by Sameer Mckinney RN (offgoing nurse). Report included the following information SBAR.

## 2021-06-30 NOTE — MED STUDENT NOTES
This is a medical student note for learning purposes only. Please refer to the attending physician note for final recommendations. Contact our [address] [place] [address] Adela team with any questions or concerns. [facility] Family Medicine  Progress Note    Patient: Sharon Mills MRN: [id number]   SSN: [id number]  YOB: 1960   Age: 63 y.o. Sex: female      Admit Date: 6/28/2021    LOS: 2 days   Chief Complaint   Patient presents with    Shortness of Breath       Subjective:     Ms. Joaquina Porter reports no acute changes overnight. She states that her cough, SOB and headache are still present but are significantly improved. She states that overall she is feeling better than she did yesterday but still expresses concern about her blood sugar being high. ROS-  Constitutional: No acute distress. No fever. Respiratory: Positive for non-productive cough and SOB  Cardiovascular: Negative for chest pain. Regular rate and rhythm. No murmurs/rubs/gallops. GI: Pain in the area of her long-standing hernia. Neurological: Mild HA; Poor sleep. Objective:     Visit Vitals  /69 (BP 1 Location: Left arm, BP Patient Position: At rest)   Pulse 84   Temp 98.4 °F (36.9 °C)   Resp 18   Ht 5' 3\" (1.6 m)   Wt 118.4 kg (261 lb)   SpO2 96%   BMI 46.23 kg/m²       Physical Exam:   Physical Examination: Chest - clear to auscultation, no wheezes, rales or rhonchi, symmetric air entry  Heart - normal rate, regular rhythm, normal S1, S2, no murmurs, rubs, clicks or gallops  Abdomen - tenderness noted surrounding hernia  bowel sounds normal  Extremities - pedal edema 1 + in LLE, no edema in RLE    Intake and Output:  Current Shift: No intake/output data recorded.   Last three shifts: 06/28 1901 - 06/30 0700  In: 140 [P.O.:140]  Out: 4300 [Urine:4300]    Lab/Data Review:  Recent Results (from the past 12 hour(s))   GLUCOSE, POC    Collection Time: 06/29/21  8:57 PM   Result Value Ref Range    Glucose (POC) 286 (H) 70 - 245 mg/dL   METABOLIC PANEL, COMPREHENSIVE    Collection Time: 06/30/21  4:39 AM   Result Value Ref Range    Sodium 136 136 - 145 mmol/L    Potassium 4.1 3.5 - 5.5 mmol/L    Chloride 100 100 - 111 mmol/L    CO2 24 21 - 32 mmol/L    Anion gap 12 3.0 - 18 mmol/L    Glucose 329 (H) 74 - 99 mg/dL    BUN 24 (H) 7.0 - 18 MG/DL    Creatinine 1.29 0.6 - 1.3 MG/DL    BUN/Creatinine ratio 19 12 - 20      GFR est AA 51 (L) >60 ml/min/1.73m2    GFR est non-AA 42 (L) >60 ml/min/1.73m2    Calcium 9.3 8.5 - 10.1 MG/DL    Bilirubin, total 0.9 0.2 - 1.0 MG/DL    ALT (SGPT) 33 13 - 56 U/L    AST (SGOT) 12 10 - 38 U/L    Alk. phosphatase 87 45 - 117 U/L    Protein, total 7.2 6.4 - 8.2 g/dL    Albumin 3.4 3.4 - 5.0 g/dL    Globulin 3.8 2.0 - 4.0 g/dL    A-G Ratio 0.9 0.8 - 1.7     CBC W/O DIFF    Collection Time: 06/30/21  4:39 AM   Result Value Ref Range    WBC 11.2 4.6 - 13.2 K/uL    RBC 3.99 (L) 4.20 - 5.30 M/uL    HGB 10.9 (L) 12.0 - 16.0 g/dL    HCT 34.4 (L) 35.0 - 45.0 %    MCV 86.2 74.0 - 97.0 FL    MCH 27.3 24.0 - 34.0 PG    MCHC 31.7 31.0 - 37.0 g/dL    RDW 14.0 11.6 - 14.5 %    PLATELET 947 185 - 337 K/uL    MPV 9.2 9.2 - 11.8 FL   GLUCOSE, POC    Collection Time: 06/30/21  6:16 AM   Result Value Ref Range    Glucose (POC) 295 (H) 70 - 110 mg/dL       RECENT RESULTS  MODALITY IMPRESSION   XR Results from Hospital Encounter encounter on 06/28/21    XR CHEST SNGL V    Narrative  EXAM: XR CHEST SNGL V    INDICATION: 63 years Female. shortness of breath. ADDITIONAL HISTORY: None. TECHNIQUE: Frontal view of the chest.    COMPARISON: Chest radiograph 3/30/2021. FINDINGS:    Underpenetration limits assessment of the lung bases, left greater than right. The cardiac silhouette is within normal limits in size. Atherosclerotic  calcifications are noted in the aorta. Pulmonary vasculature appears within  normal limits.     Streaky/patchy opacities at the bilateral lung bases, which are similar in  appearance to prior chest radiograph from 3/30/2021 and likely reflect  atelectasis. No definite evidence of pleural effusion or pneumothorax. No acute osseous abnormality appreciated. Impression  1. No radiographic evidence of acute cardiopulmonary process. 2.  Unchanged patchy bibasilar opacities, favor atelectasis. CT Results from [place] encounter on 11/02/20    CT LOW DOSE LUNG CANCER SCREENING    Narrative  CT CHEST LUNG SCREENING    INDICATION: Lung CT screening study requested as patient needs established age  criteria, smoking history requirements, or additional risk factor eligibility  requirements. CT screening study requested as patient meets established  criteria. 48 pack year smoking history. COPD. Technique: Low-dose computed tomography acquisition performed according to the  national comprehensive cancer network guidelines space on body mass index. Axial  raw images obtained. Reconstruction on lung windows and soft tissue windows with  additional coronal and sagittally reformatted series. No intravenous contrast  administered for this exam.    COMPARISON: CT abdomen/pelvis 8/31/2020, CT chest 2/21/2018    FINDINGS:  Lungs: Right middle lobe atelectasis is similar to 8/31/2020, progressed from  2018. Subsegmental atelectasis in the lingula. Mild bronchial wall thickening. 3  mm right upper lobe nodule (4, 76), not definitely seen on 2018 study. Pleura: No effusion. Lymph Nodes: No lymphadenopathy. Cardiovascular: Moderate calcified atherosclerotic disease, including the  coronary arteries. Bones: Mild degenerative changes in the spine. No suspicious osseous lesions. No  acute osseous abnormality. Visualized upper abdomen is unremarkable. Impression  IMPRESSION:    1. Right upper lobe pulmonary nodule measuring 3 mm, not definitely seen on 2018  study. Lung-RADS 2 - Benign appearance or behavior.   Management: Continue annual screening with low dose CT in 12 months. 2. Right middle lobe atelectasis is similar to 8/31/2020, progressed from 2018. 3. Mild bronchial wall thickening, likely reflecting history of COPD. MRI No results found for this or any previous visit. ULTRASOUND Results from [place] encounter on 09/28/20    US BREAST RT LIMITED=<3 QUAD    Impression  IMPRESSION:  No evidence for malignancy. Patient's palpable areas of concern at  the 12:30-1:00 right breast correlate with benign lipomas. BI-RADS CATEGORY: BI-RADS 2 - Benign    FOLLOW-UP RECOMMENDATIONS: Patient is encouraged to continue regular self  examinations, and should return to clinic should her examination or symptoms  change in any other concerning way. Otherwise, recommend routine follow-up with  annual bilateral screening mammography, for which patient will be due in  September 2021. Thank you for enabling us to participate in the care of this patient. Results from [place] encounter on 04/10/17    US ABD COMP    Impression  IMPRESSION:      1. Echogenic mildly enlarged liver, suggestive of hepatic steatosis. No focal  hepatic lesion identified. 2. Status post cholecystectomy. No biliary ductal dilatation.        Cardiology Procedures/Testing:  MODALITY RESULTS   EKG Results for orders placed or performed during the hospital encounter of 06/28/21   EKG, 12 LEAD, INITIAL   Result Value Ref Range    Ventricular Rate 76 BPM    Atrial Rate 76 BPM    P-R Interval 152 ms    QRS Duration 130 ms    Q-T Interval 426 ms    QTC Calculation (Bezet) 479 ms    Calculated P Axis 68 degrees    Calculated R Axis 38 degrees    Calculated T Axis 19 degrees    Diagnosis       Normal sinus rhythm  Right bundle branch block  Abnormal ECG  When compared with ECG of 29-APR-2021 17:48,  No significant change was found  Confirmed by Brad Shah MD, ----- ([id number]) on 6/28/2021 5:03:51 PM         ECHO 06/28/21    ECHO ADULT FOLLOW-UP OR LIMITED 06/29/2021 6/29/2021    Interpretation Summary  · LV: Estimated LVEF is 55 - 60%. Visually measured ejection fraction. Normal cavity size and systolic function (ejection fraction normal). Mild concentric hypertrophy. · PA: Moderate pulmonary hypertension. Pulmonary arterial systolic pressure is 51 mmHg. · Image quality for this study was technically difficult. · Contrast used: DEFINITY. Signed by: Brenda Phillips MD on 6/29/2021  2:58 PM       Special Testing/Procedures:  MODALITY RESULTS   MICRO All Micro Results     Procedure Component Value Units Date/Time    RESPIRATORY VIRUS PANEL W/COVID-19, PCR [[id number]] Collected: 06/28/21 1725    Order Status: Completed Specimen: Nasopharyngeal Updated: 06/28/21 1911     Adenovirus Not detected        Coronavirus 229E Not detected        Coronavirus HKU1 Not detected        Coronavirus CVNL63 Not detected        Coronavirus OC43 Not detected        SARS-CoV-2, PCR Not detected        Metapneumovirus Not detected        Rhinovirus and Enterovirus Not detected        Influenza A Not detected        Influenza A, subtype H1 Not detected        Influenza A, subtype H3 Not detected        INFLUENZA A H1N1 PCR Not detected        Influenza B Not detected        Parainfluenza 1 Not detected        Parainfluenza 2 Not detected        Parainfluenza 3 Not detected        Parainfluenza virus 4 Not detected        RSV by PCR Not detected        B. parapertussis, PCR Not detected        Bordetella pertussis - PCR Not detected        Chlamydophila pneumoniae DNA, QL, PCR Not detected        Mycoplasma pneumoniae DNA, QL, PCR Not detected            UA No results found for this or any previous visit. PATH None     Telemetry NONE   Oxygen 2L     Assessment and Plan:     Ms. Loy Borges is a 64 yo F with a PMH of COPD, diastolic HFpEF, HTN, IDDM2, SHERRIE, GERD and allergic rhinitis with a steadily improving COPD exacerbation.      COPD: COPD exacerbation is steadily improving.   - Discontinue Levaquin as Procalcitonin <0.05 and an infection unlikely. - Continue solumedrol 40 mg every 8 hrs. - Continue chronic azithromycin. HFpEF:   - Echo performed 06/29/2021 reveals RBBB. Ejection fraction 55-60%. Pulmonary arterial systolic pressure is 51 mmHg. Overall impression is no changes from her last echo in 04/29/2021. Echo confirms that cardiovascular etiology is unlikely.   -Continue Lasix 40 mg daily    IDDM2:   - Blood glucose remained at 290-300 overnight. Glucose has remained consistnetly elevated during this hospital course. Start her on mealtime lispro 3 units. Continue lantus 50 units. Patient may benefit from additional 5 units lantus in the morning. HTN: Continue lisinopril 20 mg daily at noon. SHERRIE:  -Continue on BIPAP machine at night and as needed. GERD:  -Asymptomatic. Continue Protonix 40 mg daily. Headache:   - HA has improved. - Continue Tylenol TID         Diet Diabetic diet   DVT Prophylaxis Lovenox   GI Prophylaxis    Code status    Disposition      Point of Contact   Relationship:  [phone])     Jackie Turcios, MS3   [facility]   June 30, 2021, 8:01 AM     *ATTENTION:  This note has been created by a medical student for educational purposes only. Please do not refer to the content of this note for clinical decision-making, billing, or other purposes. Please see attending physicians note to obtain clinical information on this patient. *

## 2021-06-30 NOTE — PROGRESS NOTES
Problem: Mobility Impaired (Adult and Pediatric)  Goal: *Acute Goals and Plan of Care (Insert Text)  Description: Physical Therapy Goals  Initiated 6/30/2021 and to be accomplished within 7 day(s)  1. Patient will move from supine to sit and sit to supine  in bed with modified independence. 2.  Patient will transfer from bed to chair and chair to bed with modified independence using the least restrictive device. 3.  Patient will perform sit to stand with modified independence. 4.  Patient will ambulate with modified independence for 50 feet with the least restrictive device. 5.  Patient will ascend/descend 6 stairs with handrail(s) with modified independence. PLOF: Independent or mod I with rollator. Lives with daughter in one story home with 5-6 steps to enter. Has home health aide. Outcome: Progressing Towards Goal   PHYSICAL THERAPY EVALUATION    Patient: Lucho Yuan (89 y.o. female)  Date: 6/30/2021  Primary Diagnosis: COPD exacerbation ([facility] [address] [J44.1]  Precautions: Fall  ASSESSMENT :  Seated EOB upon entry. On 2L O2; baseline O2 requirement. Denies weakness or balance difficulties prior admission. Limiting factor for mobility with SOB and neuropathic pain in bilateral feet. Reports amb in room during admission. Good seated balance. Supervision for sit to stand d/t O2 line. Amb 20ft with no AD and 2L O2. Requires standing rest break at 10ft d/t SOB; less than 30 seconds. Returned to seated EOB and reports fatigue and SOB from amb. Educated on role of PT and plan of care; verbalized understanding. Remained seated EOB on 2L O2 at end of session. Educated on need for RN assistance with mobility; verbalized understanding. Call bell in reach. Patient will benefit from skilled intervention to address the above impairments.   Patient's rehabilitation potential is considered to be Fair  Factors which may influence rehabilitation potential include:   []         None noted  []         Mental ability/status  [x]         Medical condition  []         Home/family situation and support systems  []         Safety awareness  []         Pain tolerance/management  []         Other:      PLAN :  Recommendations and Planned Interventions:   [x]           Bed Mobility Training             [x]    Neuromuscular Re-Education  [x]           Transfer Training                   []    Orthotic/Prosthetic Training  [x]           Gait Training                          []    Modalities  [x]           Therapeutic Exercises           []    Edema Management/Control  [x]           Therapeutic Activities            [x]    Family Training/Education  [x]           Patient Education  []           Other (comment):    Frequency/Duration: Patient will be followed by physical therapy 3-5 times a week to address goals. Discharge Recommendations: Home Health  Further Equipment Recommendations for Discharge: portable oxygen, rollator; has if needed     SUBJECTIVE:   Patient stated I try to walk to the end of my driveway everyday.     OBJECTIVE DATA SUMMARY:     Past Medical History:   Diagnosis Date    Asthma     Chronic lung disease     COPD     Cystocele, midline     [facility] mellitus ([facility] [address]     GERD (gastroesophageal reflux disease)     Hidradenitis suppurativa     Hyperlipidemia     Hypertension     SHERRIE on CPAP     CPAP    Stress incontinence      Past Surgical History:   Procedure Laterality Date    HX APPENDECTOMY      HX CATARACT REMOVAL Right     HX CHOLECYSTECTOMY      HX DILATION AND CURETTAGE      Dysfunctional uterine bleeding, thought 2/2 fibroids    HX HEART CATHETERIZATION  12/2020    HX TUBAL LIGATION      VA BREAST SURGERY PROCEDURE UNLISTED      Right breast benign tumor removal     Barriers to Learning/Limitations: None  Compensate with: Visual Cues, Verbal Cues, Tactile Cues and Kinesthetic Cues    Home Situation:  Home Situation  Home Environment: Private residence  # Steps to Enter: 6  Rails to Enter: No  One/Two Story Residence: One story  Living Alone: Yes  Support Systems: Family member(s)  Patient Expects to be Discharged to[de-identified] House  Current DME Used/Available at Home: Walker, rollator, Oxygen, portable    Critical Behavior:  Neurologic State: Alert  Orientation Level: Oriented X4  Cognition: Follows commands     Psychosocial  Patient Behaviors: Cooperative    Strength:    Manual Muscle Testing (LE)         R     L    Hip Flexion:   5/5 5/5  Knee EXT:   5/5 5/5  Knee FLEX:   5/5 5/5  Ankle DF:   5/5 5/5  _________________________________________________   Range Of Motion:  BLE AROM WFL  Functional Mobility:  Transfers:  Sit to Stand: Supervision  Stand to Sit: Supervision  Balance:   Sitting: Intact  Standing: Impaired  Standing - Static: Good  Standing - Dynamic : Good  Ambulation/Gait Training:  Distance (ft): 20 Feet (ft)   Ambulation - Level of Assistance: Supervision  Neuro Re-education:  Seated balance 10 minutes    Pain:  Pain level pre-treatment: 0/10   Pain level post-treatment: 0/10     Activity Tolerance:   Fair    After treatment:   []         Patient left in no apparent distress sitting up in chair  [x]         Patient left in no apparent distress in bed; seated EOB  [x]         Call bell left within reach  [x]         Nursing notified  []         Caregiver present  []         Bed alarm activated  []         SCDs applied    COMMUNICATION/EDUCATION:   [x]         Role of physical therapy and plan of care in the acute care setting. [x]         Fall prevention education was provided and the patient/caregiver indicated understanding. [x]         Patient/family have participated as able in goal setting and plan of care. []         Patient/family agree to work toward stated goals and plan of care. []         Patient understands intent and goals of therapy, but is neutral about his/her participation.   []         Patient is unable to participate in goal setting/plan of care: ongoing with therapy staff.     Thank you for this referral.  Hosea Zhong, PT   Time Calculation: 15 mins    Eval Complexity: History: MEDIUM  Complexity : 1-2 comorbidities / personal factors will impact the outcome/ POC Exam:MEDIUM Complexity : 3 Standardized tests and measures addressing body structure, function, activity limitation and / or participation in recreation  Presentation: MEDIUM Complexity : Evolving with changing characteristics  Clinical Decision Making:Medium Complexity   Clinical judgement; ROM, MMT, functional mobility Overall Complexity:MEDIUM

## 2021-06-30 NOTE — PROGRESS NOTES
OCCUPATIONAL THERAPY EVALUATION/DISCHARGE    Patient: Facundo Obrien (89 y.o. female)  Date: 6/30/2021  Primary Diagnosis: COPD exacerbation ([facility] [address] [J44.1]        Precautions:   Fall  PLOF: Pt lives with daughter and grandson and has PCA 5x days per week assisting with medication management, IADLs, and if needed ADLs. ASSESSMENT AND RECOMMENDATIONS:  Based on the objective data described below, the patient presents with ability to perform basic ADLs and functional transfers at baseline level of function. Pt performed/simulated UB/LB ADLs with Mod Ind/Set-up for seated and std aspects. Pt demonstrates good safety awareness during all standing tasks, reports SOB with exertion. Pt educated on mult energy conservation techniques to utilize in home environment and at the hospital, including pacing and deep breathing to prevent SOB and fatigue, and increase activity tolerance and safety w/ADLs and functional mobility, pt verbalized understanding, reports she has EC techniques handout from previous admissions. Pt has AE for LB ADLs which she uses for [facility] when needed. Pt reports diabetic neuropathy in BLE, educated pt on performing skin checks on the bottom of feet to prevent skin breakdown and infection. Pt verbalized understanding. Pt has PCA 5 days a week and lives with supportive family available to assist as needed when PCA is not available. Skilled occupational therapy is not indicated at this time. Discharge Recommendations: Home with Supervision/assistance prn vs Outpatient pulmonary rehab  Further Equipment Recommendations for Discharge: N/A      SUBJECTIVE:   Patient stated I learned to take my time.     OBJECTIVE DATA SUMMARY:     Past Medical History:   Diagnosis Date    Asthma     Chronic lung disease     COPD     Cystocele, midline     Diabetes mellitus (HCC)     GERD (gastroesophageal reflux disease)     Hidradenitis suppurativa     Hyperlipidemia     Hypertension     SHERRIE on CPAP     CPAP Stress incontinence      Past Surgical History:   Procedure Laterality Date    HX APPENDECTOMY      HX CATARACT REMOVAL Right     HX CHOLECYSTECTOMY      HX DILATION AND CURETTAGE      Dysfunctional uterine bleeding, thought 2/2 fibroids    HX HEART CATHETERIZATION  12/2020    HX TUBAL LIGATION      VA BREAST SURGERY PROCEDURE UNLISTED      Right breast benign tumor removal     Barriers to Learning/Limitations: None  Compensate with: visual, verbal, tactile, kinesthetic cues/model    Home Situation:   Home Situation  Home Environment: Private residence  # Steps to Enter: 6  Rails to Enter: No  One/Two Story Residence: One story  Living Alone: Yes  Support Systems: Family member(s)  Patient Expects to be Discharged to[de-identified] [employer]  Current DME Used/Available at Home: Stephanie Coast, rolling, Shower chair, Grab bars, Commode, bedside  Tub or Shower Type: Shower  [x]     Right hand dominant   []     Left hand dominant    Cognitive/Behavioral Status:  Neurologic State: Alert  Orientation Level: Oriented X4  Cognition: Follows commands  Safety/Judgement: Awareness of environment    Skin: visible skin intact  Edema: none noted    Vision/Perceptual:       Acuity: Able to read clock/calendar on wall without difficulty     Coordination: BUE  Coordination: Generally decreased, functional  Fine Motor Skills-Upper: Left Intact; Right Intact    Gross Motor Skills-Upper: Left Intact; Right Intact    Balance:  Sitting: Intact  Standing: Intact  Standing - Static: Good  Standing - Dynamic : Good    Strength: BUE  Strength: Generally decreased, functional   Tone & Sensation: BUE  Tone: Normal  Sensation: Intact   Range of Motion: BUE  AROM: Within functional limits   Functional Mobility and Transfers for ADLs:  Bed Mobility:     Supine to Sit: Modified independent  Sit to Supine: Modified independent     Transfers:  Sit to Stand: Supervision  Stand to Sit: Modified independent   Toilet Transfer : Modified independent    Bathroom Mobility: Supervision/set up  ADL Assessment:  Feeding: Setup  Oral Facial Hygiene/Grooming: Setup  Bathing: Supervision  Upper Body Dressing: Modified independent  Lower Body Dressing: Modified independent;Supervision  Toileting: Supervision     ADL Intervention:     Cognitive Retraining  Safety/Judgement: Awareness of environment  Pain:  Pain level pre-treatment: 0/10   Pain level post-treatment: 0/10     Activity Tolerance:   Fair  Please refer to the flowsheet for vital signs taken during this treatment. After treatment:   []  Patient left in no apparent distress sitting up in chair  [x]  Patient left in no apparent distress in bed  [x]  Call bell left within reach  [x]  Nursing notified  []  Caregiver present  []  Bed alarm activated    COMMUNICATION/EDUCATION:   [x]      Role of Occupational Therapy in the acute care setting  [x]      Home safety education was provided and the patient/caregiver indicated understanding. [x]      Patient/family have participated as able and agree with findings and recommendations. []      Patient is unable to participate in plan of care at this time. Thank you for this referral.  Mayco Marie OTR/L  Time Calculation: 23 mins      Eval Complexity: History: LOW Complexity : Brief history review ; Examination: LOW Complexity : 1-3 performance deficits relating to physical, cognitive , or psychosocial skils that result in activity limitations and / or participation restrictions ;    Decision Making:LOW Complexity : No comorbidities that affect functional and no verbal or physical assistance needed to complete eval tasks

## 2021-06-30 NOTE — PROGRESS NOTES
Bedside and Verbal shift change report given to [address] (oncoming nurse) by Lesly Carter RN   (offgoing nurse). Report given with SBAR, Kardex, MAR and Recent Results.

## 2021-06-30 NOTE — PROGRESS NOTES
Diann Calvo   ADULT PROTOCOL: JET AEROSOL ASSESSMENT    Patient  Claus Miner     63 y.o.   female     6/30/2021  11:52 AM    Breath Sounds Pre Procedure:  Breath Sounds Bilateral: Diminished                                            Breath Sounds Post Procedure: Breath Sounds Bilateral: Diminished                                               Breathing pattern: Pre procedure  Breathing Pattern: Regular          Post procedure  Breathing Pattern: Regular    Cough: Pre procedure  Cough: Non-productive               Post procedure Cough: Non-productive    Heart Rate: Pre procedure Pulse: 86           Post procedure Pulse: 88    Resp Rate: Pre procedure  Respirations: 16           Post procedure          Nebulizer Therapy: Current medications Aerosolized Medications: DuoNeb       Problem List:   Patient Active Problem List   Diagnosis Code    Essential hypertension, benign I10    Diabetes mellitus, type 2 ([facility]) E11.9    Esophageal reflux K21.9    SHERRIE on CPAP G47.33, Z99.89    COPD (chronic obstructive pulmonary disease) ([facility]) J44.9    Allergic rhinitis J30.9    COPD with acute exacerbation ([facility]) J44.1    Obesity, Class III, BMI 40-49.9 (morbid obesity) ([facility]) E66.01    Chest pain R07.9    Dyspnea R06.00    COPD exacerbation ([facility]) J44.1    Acute exacerbation of COPD with asthma ([facility]) J44.1, Z37.338    Diastolic CHF, chronic ([facility]) I50.32    Diverticulosis K57.90    COPD with acute bronchitis ([facility]) J44.0, J20.9    Hyperlipidemia E78.5    Type 2 diabetes with nephropathy ([facility]) E11.21    Bilateral carotid bruits R09.89    Palpitations R00.2    Acute exacerbation of chronic obstructive pulmonary disease (COPD) ([facility]) J44.1    Atelectasis of right lung J98.11    Hypoxia R09.02    COVID-19 U07.1    Hypokalemia E87.6    COVID-19 virus infection U07.1    Acute respiratory disease due to COVID-19 virus U07.1, J06.9    Pneumonia due to COVID-19 virus U07.1, J12.82    Severe persistent asthma with exacerbation J45.51    Acute otitis externa H60.509    Anxiety F41.9    Diastasis recti L67.97    Diastolic dysfunction P91.50    Diverticulitis K57.92    Pleurisy R09.1    H/O allergy Z88.9    Hepatic steatosis K76.0    Hypersomnia G47.10    Inadequate sleep hygiene Z72.821    Lung nodule R91.1    Obstructive chronic bronchitis with acute exacerbation (HCC) J44.1    Osteoarthritis M19.90    Osteoporosis M81.0    Periodic limb movements of sleep G47.61    Pneumonia J18.9    Racing heart beat R00.0    Right upper quadrant abdominal pain R10.11    Status asthmaticus J45.902    Urinary frequency R35.0    Urinary tract infection N39.0    Vitamin D deficiency E55.9    Heart failure (HCC) I50.9    CHF (congestive heart failure) ([facility]) I50.9       Patient alert and cooperative to use MDI: Yes     Home Respiratory Therapy Regimen/Frequency:  YES   Medication  Duoneb   Device Nebulizer  Frequency Q4 PRN    SEVERITY INDEX:    ITEM 0 1 2 3 4 Score   Respiratory Pattern and or Rate Regular  10-19 Regular  20-24   24-30    30-34 Severe SOB or   Greater than 35 0   Breath Sounds Clear Occasional Wheeze Mild Wheezing Moderate Wheezing  wheezing/Absent breath sounds 1   Shortness of Breath None Dyspnea on Exertion Dyspnea at Rest Moderate Shortness of Breath at Rest Severe Shortness of Breath - Limited Speech 0       Total Score:  NC 2 LPM    * Scoring Guidelines  0-4 pts:  PRN-BID   5-7 pts:  BID, TID, QID  8-9 pts:  TID, QID, Q6  10-12 pts:  Q4-Q6  * - Guidelines used with clinical judgement. PRN Treatments can be ordered to supplement scheduled treatments. Regardless of score, frequency should not be less than normal home regimen.     Recommended Order/Frequency:  Q6 WA    Comments:        Respiratory Therapist: Eri Newton, RT

## 2021-06-30 NOTE — PROGRESS NOTES
[address] [place] [address] [facility]  Pulmonary, Critical Care, and Sleep Medicine    Pulmonary Medicine Progress Note    Name: Kendell Read MRN: [id number]  : 1960 Hospital: [facility]  Date: 2021       Subjective:  Pt states she is improving. Minimal wheezing. No cough.      Patient Active Problem List   Diagnosis Code    Essential hypertension, benign I10    Diabetes mellitus[facility] type 2 [facility] E11.9    Esophageal reflux K21.9    SHERRIE on CPAP G47.33, Z99.89    COPD (chronic obstructive pulmonary disease) ([facility]) J44.9    Allergic rhinitis J30.9    COPD with acute exacerbation ([facility]) J44.1    Obesity, Class III, BMI 40-49.9 (morbid obesity) ([facility]) E66.01    Chest pain R07.9    Dyspnea R06.00    COPD exacerbation ([facility]) J44.1    Acute exacerbation of COPD with asthma ([facility]) J44.1, Y65.990    Diastolic CHF, chronic ([facility]) I50.32    Diverticulosis K57.90    COPD with acute bronchitis ([facility]) J44.0, J20.9    Hyperlipidemia E78.5    Type 2 diabetes with nephropathy ([facility]) E11.21    Bilateral carotid bruits R09.89    Palpitations R00.2    Acute exacerbation of chronic obstructive pulmonary disease (COPD) ([facility]) J44.1    Atelectasis of right lung J98.11    Hypoxia R09.02    COVID-19 U07.1    Hypokalemia E87.6    COVID-19 virus infection U07.1    Acute respiratory disease due to COVID-19 virus U07.1, J06.9    Pneumonia due to COVID-19 virus U07.1, J12.82    Severe persistent asthma with exacerbation J45.51    Acute otitis externa H60.509    Anxiety F41.9    Diastasis recti N59.61    Diastolic dysfunction Z01.62    Diverticulitis K57.92    Pleurisy R09.1    H/O allergy Z88.9    Hepatic steatosis K76.0    Hypersomnia G47.10    Inadequate sleep hygiene Z72.821    Lung nodule R91.1    Obstructive chronic bronchitis with acute exacerbation ([facility]) J44.1    Osteoarthritis M19.90    Osteoporosis M81.0    Periodic limb movements of sleep G47.61    Pneumonia J18.9    Racing heart beat R00.0    Right upper quadrant abdominal pain R10.11    Status asthmaticus J45.902    Urinary frequency R35.0    Urinary tract infection N39.0    Vitamin D deficiency E55.9    Heart failure ([facility]) I50.9    CHF (congestive heart failure) ([facility]) I50.9       Assessment:  · Acute on Chronic Respiratory Failure  · - currently on 2L NC  · Acute exacerbation of COPD/asthma overlap syndrome   · Severe persistent asthma with steroid dependence  · - follows with Benito Menjivar. Hx of eosinophilia- on benralizumab injections for the last year, and notes significant improvement.  Pt also on dual ICS/LABA/LAMA/LTRA therapy  · COPD, gold D  · Morbid obesity: Body mass index is 46.75 kg/m². · Obesity hypoventilation syndrome with SHERRIE: Patient on trilogy in the [facility] setting  · Hx of Covid19 infection- 8/2020 requiring inpatient therapy  · Pulmonary HTN- group 2  · Osteoporosis  · Hx of tobacco abuse- quit in 2006. Impression/Plan:  · brovana and pulmicort nebs duonebs  · Change to PO prednisone  · No need for ABX other than chronic azithro  · BIPAP therapy at night  · Will order IS therapy  · Aspiration precautions  · Assess home Oxygen needs at discharge  · OT, PT, OOB and ambulate     Will follow    FiO2 to keep SpO2 >=92%, HOB >=30 degree, aspiration precautions, aggressive pulmonary toileting, incentive spirometry. Other issues management by primary team and respective consultants. Events and notes from last 24 hours reviewed. Discussed with patient and family, answered all questions to their satisfaction. Care plan discussed with nursing.      Labs and images personally seen and available reports reviewed  All current medicines are reviewed       Medications- Current:  Current Facility-Administered Medications   Medication Dose Route Frequency    insulin lispro (HUMALOG) injection 3 Units  3 Units SubCUTAneous TIDAC    acetaminophen (TYLENOL) tablet 1,000 mg  1,000 mg Oral Q8H    predniSONE (DELTASONE) tablet 40 mg  40 mg Oral DAILY WITH DINNER    albuterol-ipratropium (DUO-NEB) 2.5 MG-0.5 MG/3 ML  3 mL Nebulization Q4H RT    insulin lispro (HUMALOG) injection   SubCUTAneous AC&HS    arformoteroL (BROVANA) neb solution 15 mcg  15 mcg Nebulization BID RT    budesonide (PULMICORT) 1,000 mcg/2 mL nebulizer susp  1,000 mcg Nebulization BID RT    furosemide (LASIX) tablet 40 mg  40 mg Oral BID    albuterol-ipratropium (DUO-NEB) 2.5 MG-0.5 MG/3 ML  3 mL Nebulization Q4H PRN    aspirin delayed-release tablet 81 mg  81 mg Oral DAILY    azithromycin (ZITHROMAX) tablet 250 mg  250 mg Oral Q MON, WED & FRI    insulin glargine (LANTUS) injection 50 Units  50 Units SubCUTAneous QHS    pantoprazole (PROTONIX) tablet 40 mg  40 mg Oral DAILY    simethicone (MYLICON) tablet 646 mg  120 mg Oral QID PRN    tiotropium bromide (SPIRIVA RESPIMAT) 2.5 mcg /actuation  2 Puff Inhalation DAILY    sodium chloride (NS) flush 5-40 mL  5-40 mL IntraVENous Q8H    sodium chloride (NS) flush 5-40 mL  5-40 mL IntraVENous PRN    polyethylene glycol (MIRALAX) packet 17 g  17 g Oral DAILY PRN    enoxaparin (LOVENOX) injection 40 mg  40 mg SubCUTAneous DAILY    lisinopriL (PRINIVIL, ZESTRIL) tablet 20 mg  20 mg Oral DAILY    glucose chewable tablet 16 g  16 g Oral PRN    glucagon (GLUCAGEN) injection 1 mg  1 mg IntraMUSCular PRN    dextrose (D50W) injection syrg 12.5-25 g  25-50 mL IntraVENous PRN       Objective:  Vital Signs:    Visit Vitals  BP (!) 151/70   Pulse 76   Temp 98.2 °F (36.8 °C)   Resp 21   Ht 5' 3\" (1.6 m)   Wt 118.4 kg (261 lb)   SpO2 93%   BMI 46.23 kg/m²      O2 Device: Nasal cannula  O2 Flow Rate (L/min): 2 l/min  Temp (24hrs), Av.9 °F (36.6 °C), Min:97.2 °F (36.2 °C), Max:98.4 °F (36.9 °C)      Intake/Output:   Last shift:      No intake/output data recorded.   Last 3 shifts: 1901 -  0700  In: 140 [P.O.:140]  Out: 4300 [Urine:4300]    Intake/Output Summary (Last 24 hours) at 6/30/2021 1034  Last data filed at 6/30/2021 0612  Gross per 24 hour   Intake --   Output 4300 ml   Net -4300 ml       Physical Exam:     General/Neurology: Alert, Awake  Head:   Normocephalic, without obvious abnormality  Eye:   PERRL, EOM intact  Oral:  Mucus membranes moist  Lung:   B/l air entry fair. No rales. No rhonchi. No wheezing  Heart:   S1 S2 present  Abdomen:  Soft, non tender, BS+nt   Extremities:  No pedal edema.    Skin:   Dry, intact  Data:      Recent Results (from the past 24 hour(s))   GLUCOSE, POC    Collection Time: 06/29/21 11:22 AM   Result Value Ref Range    Glucose (POC) 279 (H) 70 - 110 mg/dL   ECHO ADULT FOLLOW-UP OR LIMITED    Collection Time: 06/29/21  2:26 PM   Result Value Ref Range    IVSd 1.20 (A) 0.60 - 0.90 cm    LVIDd 4.04 3.90 - 5.30 cm    LVIDs 2.74 cm    LVPWd 1.13 (A) 0.60 - 0.90 cm    Tapse 2.36 (A) 1.50 - 2.00 cm    Triscuspid Valve Regurgitation Peak Gradient 43.39 mmHg    TR Max Velocity 329.35 cm/s    LV Mass .8 67.0 - 162.0 g    LV Mass AL Index 74.1 43.0 - 95.0 g/m2   PROCALCITONIN    Collection Time: 06/29/21  2:55 PM   Result Value Ref Range    Procalcitonin <0.05 ng/mL   GLUCOSE, POC    Collection Time: 06/29/21  4:25 PM   Result Value Ref Range    Glucose (POC) 296 (H) 70 - 110 mg/dL   GLUCOSE, POC    Collection Time: 06/29/21  8:57 PM   Result Value Ref Range    Glucose (POC) 286 (H) 70 - 709 mg/dL   METABOLIC PANEL, COMPREHENSIVE    Collection Time: 06/30/21  4:39 AM   Result Value Ref Range    Sodium 136 136 - 145 mmol/L    Potassium 4.1 3.5 - 5.5 mmol/L    Chloride 100 100 - 111 mmol/L    CO2 24 21 - 32 mmol/L    Anion gap 12 3.0 - 18 mmol/L    Glucose 329 (H) 74 - 99 mg/dL    BUN 24 (H) 7.0 - 18 MG/DL    Creatinine 1.29 0.6 - 1.3 MG/DL    BUN/Creatinine ratio 19 12 - 20      GFR est AA 51 (L) >60 ml/min/1.73m2    GFR est non-AA 42 (L) >60 ml/min/1.73m2    Calcium 9.3 8.5 - 10.1 MG/DL    Bilirubin, total 0.9 0.2 - 1.0 MG/DL    ALT (SGPT) 33 13 - 56 U/L AST (SGOT) 12 10 - 38 U/L    Alk. phosphatase 87 45 - 117 U/L    Protein, total 7.2 6.4 - 8.2 g/dL    Albumin 3.4 3.4 - 5.0 g/dL    Globulin 3.8 2.0 - 4.0 g/dL    A-G Ratio 0.9 0.8 - 1.7     CBC W/O DIFF    Collection Time: 06/30/21  4:39 AM   Result Value Ref Range    WBC 11.2 4.6 - 13.2 K/uL    RBC 3.99 (L) 4.20 - 5.30 M/uL    HGB 10.9 (L) 12.0 - 16.0 g/dL    HCT 34.4 (L) 35.0 - 45.0 %    MCV 86.2 74.0 - 97.0 FL    MCH 27.3 24.0 - 34.0 PG    MCHC 31.7 31.0 - 37.0 g/dL    RDW 14.0 11.6 - 14.5 %    PLATELET 989 376 - 393 K/uL    MPV 9.2 9.2 - 11.8 FL   GLUCOSE, POC    Collection Time: 06/30/21  6:16 AM   Result Value Ref Range    Glucose (POC) 295 (H) 70 - 110 mg/dL         Chemistry   Recent Labs     06/30/21 0439 06/29/21 0102 06/28/21  1040   * 282* 193*    135* 139   K 4.1 4.3 4.3    100 103   CO2 24 25 31   BUN 24* 16 13   CREA 1.29 1.06 0.92   CA 9.3 9.6 9.3   MG  --  2.2  --    PHOS  --  2.3*  --    AGAP 12 10 5   BUCR 19 15 14   AP 87 109 114   TP 7.2 8.2 8.0   ALB 3.4 3.6 3.7   GLOB 3.8 4.6* 4.3*   AGRAT 0.9 0.8 0.9       CBC w/Diff   Recent Labs     06/30/21 0439 06/29/21 0102 06/28/21  1040   WBC 11.2 9.1 4.4*   RBC 3.99* 4.49 4.63   HGB 10.9* 12.6 12.6   HCT 34.4* 38.4 39.2    344 310   GRANS  --   --  60   LYMPH  --   --  32   EOS  --   --  0       ABG No results for input(s): PHI, PHI, POC2, PCO2I, PO2, PO2I, HCO3, HCO3I, FIO2, FIO2I in the last 72 hours. Micro  No results for input(s): SDES, CULT in the last 72 hours. No results for input(s): CULT in the last 72 hours. CT (Most Recent) Results from Hospital Encounter encounter on 11/02/20    CT LOW DOSE LUNG CANCER SCREENING    Narrative  CT CHEST LUNG SCREENING    INDICATION: Lung CT screening study requested as patient needs established age  criteria, smoking history requirements, or additional risk factor eligibility  requirements. CT screening study requested as patient meets established  criteria.  48 pack year smoking history. COPD. Technique: Low-dose computed tomography acquisition performed according to the  national comprehensive cancer network guidelines space on body mass index. Axial  raw images obtained. Reconstruction on lung windows and soft tissue windows with  additional coronal and sagittally reformatted series. No intravenous contrast  administered for this exam.    COMPARISON: CT abdomen/pelvis 8/31/2020, CT chest 2/21/2018    FINDINGS:  Lungs: Right middle lobe atelectasis is similar to 8/31/2020, progressed from  2018. Subsegmental atelectasis in the lingula. Mild bronchial wall thickening. 3  mm right upper lobe nodule (4, 76), not definitely seen on 2018 study. Pleura: No effusion. Lymph Nodes: No lymphadenopathy. Cardiovascular: Moderate calcified atherosclerotic disease, including the  coronary arteries. Bones: Mild degenerative changes in the spine. No suspicious osseous lesions. No  acute osseous abnormality. Visualized upper abdomen is unremarkable. Impression  IMPRESSION:    1. Right upper lobe pulmonary nodule measuring 3 mm, not definitely seen on 2018  study. Lung-RADS 2 - Benign appearance or behavior. Management: Continue annual screening with low dose CT in 12 months. 2. Right middle lobe atelectasis is similar to 8/31/2020, progressed from 2018. 3. Mild bronchial wall thickening, likely reflecting history of COPD. XR (Most Recent). CXR reviewed by me and compared with previous CXR Results from Hospital Encounter encounter on 06/28/21    XR CHEST SNGL V    Narrative  EXAM: XR CHEST SNGL V    INDICATION: 63 years Female. shortness of breath. ADDITIONAL HISTORY: None. TECHNIQUE: Frontal view of the chest.    COMPARISON: Chest radiograph 3/30/2021. FINDINGS:    Underpenetration limits assessment of the lung bases, left greater than right. The cardiac silhouette is within normal limits in size. Atherosclerotic  calcifications are noted in the aorta. Pulmonary vasculature appears within  normal limits. Streaky/patchy opacities at the bilateral lung bases, which are similar in  appearance to prior chest radiograph from 3/30/2021 and likely reflect  atelectasis. No definite evidence of pleural effusion or pneumothorax. No acute osseous abnormality appreciated. Impression  1. No radiographic evidence of acute cardiopulmonary process. 2.  Unchanged patchy bibasilar opacities, favor atelectasis. See my orders for details     Total care time exclusive of procedures with complex decision making, coordination of care and counseling patient performed and > 50% time spent in face to face evaluation as mentioned above.     Doni Hope MD  Critical Care Medicine

## 2021-06-30 NOTE — DIABETES MGMT
GLYCEMIC CONTROL PLAN OF CARE    Assessment:  History:  Admitting diagnosis of COPD exacerbation  Morning blood glucose:  295 mg/dl  IVF containing dextrose:  None   Steroids: prednisone 40 mg daily with dinner. Diet/TF:  Regular diet, carb control, no concentrated sweets. Recommendations:  Blood glucose remains elevated this am.  Noted lantus 5 units added every morning, in addition to the lantus 50 units at bedtime. Noted mealtime insulin ordered. Lispro 3 units 3 times daily with meals. Continue corrective insulin coverage as needed. Already receiving very insulin resistant dosing. Will continue inpatient monitoring. Most recent BG values:       Results for Av Ernandez (MRN [id number]) as of 2021 13:15   Ref. Range 2021 11:22 2021 16:25 2021 20:57 2021 06:16 2021 12:37   GLUCOSE,FAST - POC Latest Ref Range: 70 - 110 mg/dL 279 (H) 296 (H) 286 (H) 295 (H) 306 (H)     Within target range  mg/dl? No   Current A1C:  7.9% equivalent to an average blood glucose of 180 mg/dl over the past 2-3 months. Adequate glycemic control PTA? No   Current hospital diabetes medications:  lantus 5 units daily  lantus 50 units every bedtime  Lispro 3 units 3 times daily with meals. Lispro corrective insulin coverage AC&HS  Previous days insulin requirements:  lantus 50 units  Lispro 31 units corrective insulin  Home diabetes medication:  lantus 50 units daily  humalog sliding scale with meals      Education:  __x_ see diabetes education record (3021)            ___ diabetes education not indicated at this time.     [facility] RN CDE  Ext [phone]

## 2021-06-30 NOTE — PROGRESS NOTES
Bedside and Verbal shift change report given to [address] (oncoming nurse) by Zeb Mora RN   (offgoing nurse). Report given with SBAR, Kardex, MAR and Recent Results.

## 2021-06-30 NOTE — PROGRESS NOTES
Problem: Diabetes Self-Management  Goal: *Disease process and treatment process  Description: Define diabetes and identify own type of diabetes; list 3 options for treating diabetes. Outcome: Progressing Towards Goal     Problem: Falls - Risk of  Goal: *Absence of Falls  Description: Document Letha Madera Fall Risk and appropriate interventions in the flowsheet.   Outcome: Progressing Towards Goal  Note: Fall Risk Interventions:            Medication Interventions: Teach patient to arise slowly    Elimination Interventions: Call light in reach, Patient to call for help with toileting needs    History of Falls Interventions: Door open when patient unattended         Problem: Pain  Goal: *Control of Pain  Outcome: Progressing Towards Goal     Problem: Patient Education: Go to Patient Education Activity  Goal: Patient/Family Education  Outcome: Progressing Towards Goal     Problem: Injury - Risk of, Adverse Drug Event  Goal: *Absence of adverse drug events  Outcome: Progressing Towards Goal     Problem: Patient Education: Go to Patient Education Activity  Goal: Patient/Family Education  Outcome: Progressing Towards Goal

## 2021-07-01 VITALS
BODY MASS INDEX: 46.26 KG/M2 | HEART RATE: 70 BPM | RESPIRATION RATE: 20 BRPM | WEIGHT: 261.1 LBS | DIASTOLIC BLOOD PRESSURE: 77 MMHG | OXYGEN SATURATION: 98 % | TEMPERATURE: 97.1 F | SYSTOLIC BLOOD PRESSURE: 122 MMHG | HEIGHT: 63 IN

## 2021-07-01 LAB
ALBUMIN SERPL-MCNC: 3.5 G/DL (ref 3.4–5)
ALBUMIN/GLOB SERPL: 0.9 {RATIO} (ref 0.8–1.7)
ALP SERPL-CCNC: 86 U/L (ref 45–117)
ALT SERPL-CCNC: 32 U/L (ref 13–56)
ANION GAP SERPL CALC-SCNC: 11 MMOL/L (ref 3–18)
AST SERPL-CCNC: 14 U/L (ref 10–38)
BILIRUB SERPL-MCNC: 0.4 MG/DL (ref 0.2–1)
BUN SERPL-MCNC: 25 MG/DL (ref 7–18)
BUN/CREAT SERPL: 26 (ref 12–20)
CALCIUM SERPL-MCNC: 9.3 MG/DL (ref 8.5–10.1)
CHLORIDE SERPL-SCNC: 102 MMOL/L (ref 100–111)
CO2 SERPL-SCNC: 24 MMOL/L (ref 21–32)
CREAT SERPL-MCNC: 0.98 MG/DL (ref 0.6–1.3)
ERYTHROCYTE [DISTWIDTH] IN BLOOD BY AUTOMATED COUNT: 13.9 % (ref 11.6–14.5)
GLOBULIN SER CALC-MCNC: 3.8 G/DL (ref 2–4)
GLUCOSE BLD STRIP.AUTO-MCNC: 125 MG/DL (ref 70–110)
GLUCOSE BLD STRIP.AUTO-MCNC: 140 MG/DL (ref 70–110)
GLUCOSE BLD STRIP.AUTO-MCNC: 173 MG/DL (ref 70–110)
GLUCOSE SERPL-MCNC: 198 MG/DL (ref 74–99)
HCT VFR BLD AUTO: 35.1 % (ref 35–45)
HGB BLD-MCNC: 11.4 G/DL (ref 12–16)
MCH RBC QN AUTO: 28 PG (ref 24–34)
MCHC RBC AUTO-ENTMCNC: 32.5 G/DL (ref 31–37)
MCV RBC AUTO: 86.2 FL (ref 74–97)
PLATELET # BLD AUTO: 307 K/UL (ref 135–420)
PMV BLD AUTO: 9.5 FL (ref 9.2–11.8)
POTASSIUM SERPL-SCNC: 3.7 MMOL/L (ref 3.5–5.5)
PROT SERPL-MCNC: 7.3 G/DL (ref 6.4–8.2)
RBC # BLD AUTO: 4.07 M/UL (ref 4.2–5.3)
SODIUM SERPL-SCNC: 137 MMOL/L (ref 136–145)
WBC # BLD AUTO: 10.1 K/UL (ref 4.6–13.2)

## 2021-07-01 PROCEDURE — 85027 COMPLETE CBC AUTOMATED: CPT

## 2021-07-01 PROCEDURE — 74011000250 HC RX REV CODE- 250: Performed by: FAMILY MEDICINE

## 2021-07-01 PROCEDURE — 74011636637 HC RX REV CODE- 636/637: Performed by: FAMILY MEDICINE

## 2021-07-01 PROCEDURE — 36415 COLL VENOUS BLD VENIPUNCTURE: CPT

## 2021-07-01 PROCEDURE — 74011250637 HC RX REV CODE- 250/637: Performed by: FAMILY MEDICINE

## 2021-07-01 PROCEDURE — 74011636637 HC RX REV CODE- 636/637: Performed by: INTERNAL MEDICINE

## 2021-07-01 PROCEDURE — 94760 N-INVAS EAR/PLS OXIMETRY 1: CPT

## 2021-07-01 PROCEDURE — 80053 COMPREHEN METABOLIC PANEL: CPT

## 2021-07-01 PROCEDURE — 74011250636 HC RX REV CODE- 250/636: Performed by: FAMILY MEDICINE

## 2021-07-01 PROCEDURE — 82962 GLUCOSE BLOOD TEST: CPT

## 2021-07-01 PROCEDURE — 77010033678 HC OXYGEN DAILY

## 2021-07-01 PROCEDURE — 97116 GAIT TRAINING THERAPY: CPT

## 2021-07-01 PROCEDURE — 94640 AIRWAY INHALATION TREATMENT: CPT

## 2021-07-01 PROCEDURE — 74011000250 HC RX REV CODE- 250: Performed by: INTERNAL MEDICINE

## 2021-07-01 RX ORDER — INSULIN GLARGINE 100 [IU]/ML
5 INJECTION, SOLUTION SUBCUTANEOUS EVERY MORNING
Qty: 1 VIAL | Refills: 0 | Status: SHIPPED | OUTPATIENT
Start: 2021-07-01 | End: 2021-08-18

## 2021-07-01 RX ORDER — PREDNISONE 10 MG/1
10 TABLET ORAL DAILY
Qty: 40 TABLET | Refills: 0 | Status: SHIPPED | OUTPATIENT
Start: 2021-07-01 | End: 2021-08-31

## 2021-07-01 RX ADMIN — INSULIN LISPRO 3 UNITS: 100 INJECTION, SOLUTION INTRAVENOUS; SUBCUTANEOUS at 07:54

## 2021-07-01 RX ADMIN — BUDESONIDE 1000 MCG: 1 SUSPENSION RESPIRATORY (INHALATION) at 10:08

## 2021-07-01 RX ADMIN — IPRATROPIUM BROMIDE AND ALBUTEROL SULFATE 3 ML: .5; 3 SOLUTION RESPIRATORY (INHALATION) at 14:15

## 2021-07-01 RX ADMIN — PREDNISONE 40 MG: 20 TABLET ORAL at 16:18

## 2021-07-01 RX ADMIN — LISINOPRIL 20 MG: 20 TABLET ORAL at 12:49

## 2021-07-01 RX ADMIN — INSULIN GLARGINE 5 UNITS: 100 INJECTION, SOLUTION SUBCUTANEOUS at 07:54

## 2021-07-01 RX ADMIN — ACETAMINOPHEN 1000 MG: 500 TABLET, FILM COATED ORAL at 06:35

## 2021-07-01 RX ADMIN — ENOXAPARIN SODIUM 40 MG: 40 INJECTION SUBCUTANEOUS at 10:03

## 2021-07-01 RX ADMIN — ARFORMOTEROL TARTRATE 15 MCG: 15 SOLUTION RESPIRATORY (INHALATION) at 10:07

## 2021-07-01 RX ADMIN — INSULIN LISPRO 3 UNITS: 100 INJECTION, SOLUTION INTRAVENOUS; SUBCUTANEOUS at 12:04

## 2021-07-01 RX ADMIN — IPRATROPIUM BROMIDE AND ALBUTEROL SULFATE 3 ML: .5; 3 SOLUTION RESPIRATORY (INHALATION) at 10:07

## 2021-07-01 RX ADMIN — PANTOPRAZOLE SODIUM 40 MG: 40 TABLET, DELAYED RELEASE ORAL at 10:03

## 2021-07-01 RX ADMIN — Medication 81 MG: at 10:03

## 2021-07-01 RX ADMIN — FUROSEMIDE 40 MG: 40 TABLET ORAL at 10:03

## 2021-07-01 RX ADMIN — Medication 10 ML: at 06:35

## 2021-07-01 NOTE — PROGRESS NOTES
Problem: Mobility Impaired (Adult and Pediatric)  Goal: *Acute Goals and Plan of Care (Insert Text)  Description: Physical Therapy Goals  Initiated 6/30/2021 and to be accomplished within 7 day(s)  1. Patient will move from supine to sit and sit to supine  in bed with modified independence. 2.  Patient will transfer from bed to chair and chair to bed with modified independence using the least restrictive device. 3.  Patient will perform sit to stand with modified independence. 4.  Patient will ambulate with modified independence for 50 feet with the least restrictive device. 5.  Patient will ascend/descend 6 stairs with handrail(s) with modified independence. PLOF: Independent or mod I with rollator. Lives with daughter in one story home with 5-6 steps to enter. Has home health aide. Outcome: Progressing Towards Goal     PHYSICAL THERAPY TREATMENT    Patient: Gonzalo Zavala (53 y.o. female)  Date: 7/1/2021  Diagnosis: COPD exacerbation ([address] [J44.1] COPD exacerbation ([facility]       Precautions: Fall    ASSESSMENT:  Pt found sitting on EOB, 2L o2 NC donned, willing to work with PT. She reports leaving today. Pt stood with supervision and no AD and amb x2 30 ft around the room, requiring seated rest in between 2/2 breathing and endurance. Pt declined stairs today, and was edu on importance of having her grandson there with her on stairs for safety. She was left sitting on EOB with call bell and all needs met. Progression toward goals:   []      Improving appropriately and progressing toward goals  [x]      Improving slowly and progressing toward goals  []      Not making progress toward goals and plan of care will be adjusted     PLAN:  Patient continues to benefit from skilled intervention to address the above impairments. Continue treatment per established plan of care.   Discharge Recommendations:  Home Health with increased supervision  Further Equipment Recommendations for Discharge:  N/A SUBJECTIVE:   Patient stated i've got a 6'3 grandson at home who can help me.     OBJECTIVE DATA SUMMARY:   Critical Behavior:  Neurologic State: Alert  Orientation Level: Oriented X4  Cognition: Follows commands  Safety/Judgement: Fall prevention  Functional Mobility Training:    Transfers:  Sit to Stand: Supervision  Stand to Sit: Supervision          Balance:  Sitting: Intact  Standing: Intact   Ambulation/Gait Training:  Distance (ft): 30 Feet (ft) (x2)  Assistive Device: Other (comment) (none)  Ambulation - Level of Assistance: Supervision        Gait Abnormalities: Decreased step clearance              Speed/Pam: Slow    Pain:  Pain level pre-treatment: 0/10  Pain level post-treatment: 0/10   Pain Intervention(s): Medication (see MAR); Rest, Ice, Repositioning   Response to intervention: Nurse notified, See doc flow    Activity Tolerance:   Pt tolerated mobility well  Please refer to the flowsheet for vital signs taken during this treatment. After treatment:   [] Patient left in no apparent distress sitting up in chair  [x] Patient left in no apparent distress in bed  [x] Call bell left within reach  [x] Nursing notified  [] Caregiver present  [] Bed alarm activated  [] SCDs applied      COMMUNICATION/EDUCATION:   [x]         Role of Physical Therapy in the acute care setting. [x]         Fall prevention education was provided and the patient/caregiver indicated understanding. [x]         Patient/family have participated as able in working toward goals and plan of care. []         Patient/family agree to work toward stated goals and plan of care. []         Patient understands intent and goals of therapy, but is neutral about his/her participation.   []         Patient is unable to participate in stated goals/plan of care: ongoing with therapy staff.  []         Other:        Chantal Cramer   Time Calculation: 8 mins-

## 2021-07-01 NOTE — DISCHARGE INSTRUCTIONS
Patient armband removed and shredded  MyChart Activation    Thank you for requesting access to [facility]. Please follow the instructions below to securely access and download your online medical record. [facility] allows you to send messages to your doctor, view your test results, renew your prescriptions, schedule appointments, and more. How Do I Sign Up? 1. In your internet browser, go to [email or web address]  2. Click on the First Time User? Click Here link in the Sign In box. You will be redirect to the New Member Sign Up page. 3. Enter your [facility] Access Code exactly as it appears below. You will not need to use this code after youve completed the sign-up process. If you do not sign up before the expiration date, you must request a new code. [facility] Access Code: Activation code not generated  Current [facility] Status: Active (This is the date your [facility] access code will )    4. Enter the last four digits of your Social Security Number (xxxx) and Date of Birth (mm/dd/yyyy) as indicated and click Submit. You will be taken to the next sign-up page. 5. Create a [facility] ID. This will be your [facility] login ID and cannot be changed, so think of one that is secure and easy to remember. 6. Create a [facility] password. You can change your password at any time. 7. Enter your Password Reset Question and Answer. This can be used at a later time if you forget your password. 8. Enter your e-mail address. You will receive e-mail notification when new information is available in [address] 9. Click Sign Up. You can now view and download portions of your medical record. 10. Click the Download Summary menu link to download a portable copy of your medical information. Additional Information    If you have questions, please visit the Frequently Asked Questions section of the [facility] website at [email or web address] Remember, [facility] is NOT to be used for urgent needs.  For medical emergencies, dial 911. DISCHARGE SUMMARY from Nurse    PATIENT INSTRUCTIONS:    After general anesthesia or intravenous sedation, for 24 hours or while taking prescription Narcotics:  · Limit your activities  · Do not drive and operate hazardous machinery  · Do not make important personal or business decisions  · Do  not drink alcoholic beverages  · If you have not urinated within 8 hours after discharge, please contact your surgeon on call. Report the following to your surgeon:  · Excessive pain, swelling, redness or odor of or around the surgical area  · Temperature over 100.5  · Nausea and vomiting lasting longer than 4 hours or if unable to take medications  · Any signs of decreased circulation or nerve impairment to extremity: change in color, persistent  numbness, tingling, coldness or increase pain  · Any questions    What to do at Home:  Recommended activity: Activity as tolerated, follow up appointments    If you experience any of the following symptoms shortness of breath, chest pain, dyspnea on exertion or at rest, please follow up with primary care physician or nearest emergency room. *  Please give a list of your current medications to your Primary Care Provider. *  Please update this list whenever your medications are discontinued, doses are      changed, or new medications (including over-the-counter products) are added. *  Please carry medication information at all times in case of emergency situations. These are general instructions for a healthy lifestyle:    No smoking/ No tobacco products/ Avoid exposure to second hand smoke  Surgeon General's Warning:  Quitting smoking now greatly reduces serious risk to your health.     Obesity, smoking, and sedentary lifestyle greatly increases your risk for illness    A healthy diet, regular physical exercise & weight monitoring are important for maintaining a healthy lifestyle    You may be retaining fluid if you have a history of heart failure or if you experience any of the following symptoms:  Weight gain of 3 pounds or more overnight or 5 pounds in a week, increased swelling in our hands or feet or shortness of breath while lying flat in bed. Please call your doctor as soon as you notice any of these symptoms; do not wait until your next office visit. The discharge information has been reviewed with the patient. The patient verbalized understanding. Discharge medications reviewed with the patient and appropriate educational materials and side effects teaching were provided.   ___________________________________________________________________________________________________________________________________

## 2021-07-01 NOTE — PROGRESS NOTES
[facility]  Intern Progress Note    Patient: Ayanna Vega MRN: [id number]   SSN: [id number]  YOB: 1960   Age: 63 y.o. Sex: female      Admit Date: 6/28/2021    LOS: 3 days   Chief Complaint   Patient presents with    Shortness of Breath       Subjective:   -Patient was seen this morning  -Very pleasant and cooperative  -Bright affect  - inquiring about further help with her lower extremity neuropathy.  -states that both her legs and feet are restless and is feeling a burning sensation.  -Tolerating PO intake well. ROS   Constitutional: Positive for malaise/fatigue. Negative for chills and fever. Respiratory: negative for cough and shortness of breath  Cardiovascular: Negative for chest pain. Gastrointestinal: Negative for abdominal pain. Neurological: Negative for headaches.        Objective:     Visit Vitals  BP (!) 142/69   Pulse 78   Temp 97.4 °F (36.3 °C)   Resp 19   Ht 5' 3\" (1.6 m)   Wt 118.4 kg (261 lb)   SpO2 95%   BMI 46.23 kg/m²       Physical Exam  Constitutional:       General: She is not in acute distress. Appearance: She is obese. She is not ill-appearing, toxic-appearing or diaphoretic. HENT:      Mouth/Throat:      Mouth: Mucous membranes are moist.      Pharynx: No oropharyngeal exudate or posterior oropharyngeal erythema. Cardiovascular:      Rate and Rhythm: Normal rate and regular rhythm. Pulses: Normal pulses. Heart sounds: Normal heart sounds. No murmur heard. No friction rub. No gallop. Pulmonary:      Effort: Pulmonary effort is normal. No respiratory distress. Breath sounds: No wheezing, rhonchi or rales. Chest:      Chest wall: No tenderness. Abdominal:      General: Abdomen is flat. Bowel sounds are normal. There is no distension. Palpations: Abdomen is soft. Tenderness: There is no abdominal tenderness. There is no guarding or rebound.    Musculoskeletal:         General: No swelling, tenderness or deformity. Right lower leg: No edema. Left lower leg: No edema. Skin:     General: Skin is warm and dry. Capillary Refill: Capillary refill takes less than 2 seconds. Neurological:      Mental Status: She is oriented to person, place, and time. Cranial Nerves: No cranial nerve deficit. Motor: No weakness.       Intake and Output:  Current Shift: No intake/output data recorded. Last three shifts: 06/28 1901 - 06/30 0700  In: 140 [P.O.:140]  Out: 4300 [Urine:4300]     Lab/Data Review:  Recent Results (from the past 12 hour(s))   METABOLIC PANEL, COMPREHENSIVE    Collection Time: 07/01/21  4:50 AM   Result Value Ref Range    Sodium 137 136 - 145 mmol/L    Potassium 3.7 3.5 - 5.5 mmol/L    Chloride 102 100 - 111 mmol/L    CO2 24 21 - 32 mmol/L    Anion gap 11 3.0 - 18 mmol/L    Glucose 198 (H) 74 - 99 mg/dL    BUN 25 (H) 7.0 - 18 MG/DL    Creatinine 0.98 0.6 - 1.3 MG/DL    BUN/Creatinine ratio 26 (H) 12 - 20      GFR est AA >60 >60 ml/min/1.73m2    GFR est non-AA 58 (L) >60 ml/min/1.73m2    Calcium 9.3 8.5 - 10.1 MG/DL    Bilirubin, total 0.4 0.2 - 1.0 MG/DL    ALT (SGPT) 32 13 - 56 U/L    AST (SGOT) 14 10 - 38 U/L    Alk.  phosphatase 86 45 - 117 U/L    Protein, total 7.3 6.4 - 8.2 g/dL    Albumin 3.5 3.4 - 5.0 g/dL    Globulin 3.8 2.0 - 4.0 g/dL    A-G Ratio 0.9 0.8 - 1.7     CBC W/O DIFF    Collection Time: 07/01/21  4:50 AM   Result Value Ref Range    WBC 10.1 4.6 - 13.2 K/uL    RBC 4.07 (L) 4.20 - 5.30 M/uL    HGB 11.4 (L) 12.0 - 16.0 g/dL    HCT 35.1 35.0 - 45.0 %    MCV 86.2 74.0 - 97.0 FL    MCH 28.0 24.0 - 34.0 PG    MCHC 32.5 31.0 - 37.0 g/dL    RDW 13.9 11.6 - 14.5 %    PLATELET 582 645 - 801 K/uL    MPV 9.5 9.2 - 11.8 FL   GLUCOSE, POC    Collection Time: 07/01/21  6:52 AM   Result Value Ref Range    Glucose (POC) 173 (H) 70 - 110 mg/dL           Assessment and Plan:   63 y.o. female with PMH COPD on home O2 (3L NC), IDDM2, HTN, HFpEF, SHERRIE on CPAP, GERD, allergic rhinitis now admitted with COPD excerbation     COPD exacerbation (improving), Chronic Hypoxic Respiratory failure, Severe persistent asthma with steroid dependence: SOB for 1-week with no improvement on outpatient therapy. DDx for SOB include COPD exacerbation vs. CHF exacerbation vs. Infectious (PNA vs. URI). Currently on Fasenra 30mg SQH every 8 weeks, last injection 6/22/2021 with improvement in COPD symptoms since that time.  Followed by pulm (Dr. Janina Worley) out patient.  CXR: acute cardiopulmonary process, unchanged patchy bibasilar opacities, favor atelectasis. Respiratory panel negative. Procalcitonin <0.05 which supports a non-infectious etiology. - duonebs q4 hours prn  - continue home Spiriva daily   - incentive spirometer  - albuterol nebs prn   - continue home azithromycin M, W, F  - supplemental oxygen to maintain SpO2 88-92%  - BIPAP at night    - Pulm consult  · brovana and pulmicort nebs, duonebs  · Change to PO prednisone  · No need for ABX other than chronic azithro  · BIPAP therapy at night  · Will order IS therapy  · Aspiration precautions  · Assess home Oxygen needs at discharge    OT, PT, OOB and ambulate  -Walk trial  - Vital signs per unit routine  - Monitor I/Os  - Ambulate as tolerated  - Lovenox 40 mg for DVT prophylaxis   - PT/OT/CM     Obesity hypoventilation syndrome with SHERRIE: Patient on trilogy     Morbid obesity: Body mass index is 46.23 kg/m².     HFpEF: ECHO (5/24/20) FISH 90 - 70%. No regional wall motion abnormality. Moderate (grade 2) left ventricular diastolic dysfunction. Mildly dilated left atrium.  Pulmonary arterial systolic pressure is 61 mmHg. Troponins negative on admission. Pro-BNP not elevated (62). EKG NSR, RBBB. Echo (6/29) EF is 55 - 60%. Mild concentric hypertrophy. Pulmonary arterial systolic pressure 51 mmHg. - Continue Lasix 40mg daily     Insulin dependent Type 2 Diabetes: Glucose on admission 193. HbA1c 7.9 on admission.  Chronic use of steroids can contribute to her hyperglycemia.   -Continue home Lantus at 50 U  -SSI  (insulin resistant scale)  -Accuchecks   -Diabetic diet  -Diabetic educator consult     Hypertension: /86  - Continue Lisinopril 20mg daily at noon     GERD: Currently asymptomatic       SHERRIE  - BIPAP at night, continue in-patient     [employer] [facility]:  - headache - scheduled tylenol 1000mg q8h   - PT/OT     - case management   Diet  Diabetic   DVT Prophylaxis  Lovenox   Code status  Full   Disposition  < 2MN      Point of Contact [place]  Relationship: Daughter  [phone]       Raymond Griffith MD, PGY-1   [address]   Intern Pager: [phone]   July 1, 2021, 11:24 AM

## 2021-07-01 NOTE — PROGRESS NOTES
Plan home with home health once medically cleared.      Luly Hamilton RN BSN  Care Manager  [phone]

## 2021-07-01 NOTE — ROUTINE PROCESS
Bedside and Verbal shift change report given to Emiliano Fuller (oncoming nurse) by Mehdi Guidry (offgoing nurse). Report included the following information SBAR.

## 2021-07-01 NOTE — MED STUDENT NOTES
This is a medical student note for learning purposes only. Please refer to the attending physician note for final recommendations. Contact our [address] [place] [address] team with any questions or concerns. [facility]  Progress Note    Patient: Ayanna Vega MRN: [id number]   SSN: [id number]  YOB: 1960   Age: 63 y.o. Sex: female      Admit Date: 6/28/2021    LOS: 3 days   Chief Complaint   Patient presents with    Shortness of Breath       Subjective:     Acute events: No acute changes overnight. Ms. Nickie Nash states that she is feeling well today and ready to go home. Her breathing has improved to where she can now get up and move around without SOB. Her cough is no longer present. Her headache has improved which she attributes to her BG coming down. She continue to use her BIPAP at night and as needed. Constitutional: No distress. Cardiovascular: No chest pain  Pulmonary: No SOB  GI: No abdominal pain    Objective:     Visit Vitals  BP (!) 144/62   Pulse 62   Temp 97.3 °F (36.3 °C)   Resp 19   Ht 5' 3\" (1.6 m)   Wt 118.4 kg (261 lb)   SpO2 98%   BMI 46.23 kg/m²       Physical Exam:   Physical Examination: General appearance - alert, well appearing, and in no distress and oriented to person, place, and time  Chest - clear to auscultation, no wheezes, rales or rhonchi, symmetric air entry  Heart - normal rate, regular rhythm, normal S1, S2, no murmurs, rubs, clicks or gallops  Abdomen - soft, nontender, nondistended, no masses or organomegaly    Intake and Output:  Current Shift: No intake/output data recorded.   Last three shifts: 06/29 1901 - 07/01 0700  In: -   Out: 5700 [Urine:5700]    Lab/Data Review:  Recent Results (from the past 12 hour(s))   GLUCOSE, POC    Collection Time: 06/30/21  9:10 PM   Result Value Ref Range    Glucose (POC) 250 (H) 70 - 110 mg/dL   CBC W/O DIFF    Collection Time: 07/01/21  4:50 AM   Result Value Ref Range    WBC 10.1 4.6 - 13.2 K/uL    RBC 4.07 (L) 4.20 - 5.30 M/uL    HGB 11.4 (L) 12.0 - 16.0 g/dL    HCT 35.1 35.0 - 45.0 %    MCV 86.2 74.0 - 97.0 FL    MCH 28.0 24.0 - 34.0 PG    MCHC 32.5 31.0 - 37.0 g/dL    RDW 13.9 11.6 - 14.5 %    PLATELET 756 818 - 194 K/uL    MPV 9.5 9.2 - 11.8 FL   GLUCOSE, POC    Collection Time: 07/01/21  6:52 AM   Result Value Ref Range    Glucose (POC) 173 (H) 70 - 110 mg/dL       RECENT RESULTS  MODALITY IMPRESSION   XR Results from [place] encounter on 06/28/21    XR CHEST SNGL V    Narrative  EXAM: XR CHEST SNGL V    INDICATION: 63 years Female. shortness of breath. ADDITIONAL HISTORY: None. TECHNIQUE: Frontal view of the chest.    COMPARISON: Chest radiograph 3/30/2021. FINDINGS:    Underpenetration limits assessment of the lung bases, left greater than right. The cardiac silhouette is within normal limits in size. Atherosclerotic  calcifications are noted in the aorta. Pulmonary vasculature appears within  normal limits. Streaky/patchy opacities at the bilateral lung bases, which are similar in  appearance to prior chest radiograph from 3/30/2021 and likely reflect  atelectasis. No definite evidence of pleural effusion or pneumothorax. No acute osseous abnormality appreciated. Impression  1. No radiographic evidence of acute cardiopulmonary process. 2.  Unchanged patchy bibasilar opacities, favor atelectasis. CT Results from [place] encounter on 11/02/20    CT LOW DOSE LUNG CANCER SCREENING    Narrative  CT CHEST LUNG SCREENING    INDICATION: Lung CT screening study requested as patient needs established age  criteria, smoking history requirements, or additional risk factor eligibility  requirements. CT screening study requested as patient meets established  criteria. 48 pack year smoking history. COPD.     Technique: Low-dose computed tomography acquisition performed according to the  national comprehensive cancer network guidelines space on body mass index. Axial  raw images obtained. Reconstruction on lung windows and soft tissue windows with  additional coronal and sagittally reformatted series. No intravenous contrast  administered for this exam.    COMPARISON: CT abdomen/pelvis 8/31/2020, CT chest 2/21/2018    FINDINGS:  Lungs: Right middle lobe atelectasis is similar to 8/31/2020, progressed from  2018. Subsegmental atelectasis in the lingula. Mild bronchial wall thickening. 3  mm right upper lobe nodule (4, 76), not definitely seen on 2018 study. Pleura: No effusion. Lymph Nodes: No lymphadenopathy. Cardiovascular: Moderate calcified atherosclerotic disease, including the  coronary arteries. Bones: Mild degenerative changes in the spine. No suspicious osseous lesions. No  acute osseous abnormality. Visualized upper abdomen is unremarkable. Impression  IMPRESSION:    1. Right upper lobe pulmonary nodule measuring 3 mm, not definitely seen on 2018  study. Lung-RADS 2 - Benign appearance or behavior. Management: Continue annual screening with low dose CT in 12 months. 2. Right middle lobe atelectasis is similar to 8/31/2020, progressed from 2018. 3. Mild bronchial wall thickening, likely reflecting history of COPD. MRI No results found for this or any previous visit. ULTRASOUND Results from [place] encounter on 09/28/20    US BREAST RT LIMITED=<3 QUAD    Impression  IMPRESSION:  No evidence for malignancy. Patient's palpable areas of concern at  the 12:30-1:00 right breast correlate with benign lipomas. BI-RADS CATEGORY: BI-RADS 2 - Benign    FOLLOW-UP RECOMMENDATIONS: Patient is encouraged to continue regular self  examinations, and should return to clinic should her examination or symptoms  change in any other concerning way. Otherwise, recommend routine follow-up with  annual bilateral screening mammography, for which patient will be due in  September 2021.     Thank you for enabling us to participate in the care of this patient. Results from [place] encounter on 04/10/17    US ABD COMP    Impression  IMPRESSION:      1. Echogenic mildly enlarged liver, suggestive of hepatic steatosis. No focal  hepatic lesion identified. 2. Status post cholecystectomy. No biliary ductal dilatation. Cardiology Procedures/Testing:  MODALITY RESULTS   EKG Results for orders placed or performed during the hospital encounter of 06/28/21   EKG, 12 LEAD, INITIAL   Result Value Ref Range    Ventricular Rate 76 BPM    Atrial Rate 76 BPM    P-R Interval 152 ms    QRS Duration 130 ms    Q-T Interval 426 ms    QTC Calculation (Bezet) 479 ms    Calculated P Axis 68 degrees    Calculated R Axis 38 degrees    Calculated T Axis 19 degrees    Diagnosis       Normal sinus rhythm  Right bundle branch block  Abnormal ECG  When compared with ECG of 29-APR-2021 17:48,  No significant change was found  Confirmed by Lebron Garcia MD, ----- ([id number]) on 6/28/2021 5:03:51 PM         ECHO 06/28/21    ECHO ADULT FOLLOW-UP OR LIMITED 06/29/2021 6/29/2021    Interpretation Summary  · LV: Estimated LVEF is 55 - 60%. Visually measured ejection fraction. Normal cavity size and systolic function (ejection fraction normal). Mild concentric hypertrophy. · PA: Moderate pulmonary hypertension. Pulmonary arterial systolic pressure is 51 mmHg. · Image quality for this study was technically difficult. · Contrast used: DEFINITY.     Signed by: Lyndon Asher MD on 6/29/2021  2:58 PM       Special Testing/Procedures:  MODALITY RESULTS   MICRO All Micro Results       Procedure Component Value Units Date/Time    RESPIRATORY VIRUS PANEL W/COVID-19, PCR [[id number]] Collected: 06/28/21 1725    Order Status: Completed Specimen: Nasopharyngeal Updated: 06/28/21 1911     Adenovirus Not detected        Coronavirus 229E Not detected        Coronavirus HKU1 Not detected        Coronavirus CVNL63 Not detected        Coronavirus OC43 Not detected SARS-CoV-2, PCR Not detected        Metapneumovirus Not detected        Rhinovirus and Enterovirus Not detected        Influenza A Not detected        Influenza A, subtype H1 Not detected        Influenza A, subtype H3 Not detected        INFLUENZA A H1N1 PCR Not detected        Influenza B Not detected        Parainfluenza 1 Not detected        Parainfluenza 2 Not detected        Parainfluenza 3 Not detected        Parainfluenza virus 4 Not detected        RSV by PCR Not detected        B. parapertussis, PCR Not detected        Bordetella pertussis - PCR Not detected        Chlamydophila pneumoniae DNA, QL, PCR Not detected        Mycoplasma pneumoniae DNA, QL, PCR Not detected              UA No results found for this or any previous visit. PATH ***     Telemetry NONE   Oxygen NONE     Assessment and Plan:     Ms. Rossy Pinzon is a 64 yo F with a PMH of COPD, diastolic HFpEF, HTN, IDDM2, SHERRIE, GERD and allergic rhinitis with a steadily resolving COPD exacerbation. COPD: COPD exacerbation has resolved. Her PE revealed clear lungs with no wheezing.  - Contact pulm for recommendations on tapering steroids   - Continue chronic azithromycin. HFpEF: Asymptomatic  -Continue home Lasix 40 mg daily     IDDM2:   - BG is coming down. Her last readings were 250 then 173.   - Continue lispro 3 units TIDAC. Continue lantus 55 units. HTN: Continue lisinopril 20 mg daily. SHERRIE:  -Continue on BIPAP PRN and qSH. GERD:  -Asymptomatic. Continue Protonix 40 mg daily. Headache:   - HA has resolved. - Discontinue Tylenol TID      Diet    DVT Prophylaxis Lovenox   GI Prophylaxis    Code status    Disposition      Point of Contact   Relationship:  [phone]     Susie Jackson, MS3   [facility] Medicine   July 1, 2021, 8:27 AM     *ATTENTION:  This note has been created by a medical student for educational purposes only.   Please do not refer to the content of this note for clinical decision-making, billing, or other purposes. Please see attending physicians note to obtain clinical information on this patient. *

## 2021-07-01 NOTE — PROGRESS NOTES
conducted an initial consultation and Spiritual Assessment for Louise Paz, who is a 63 y.o.,female. Patients Primary Language is: [place].    According to the patients EMR Cheondoism Affiliation is: Sergio Brown.     The reason the Patient came to the hospital is:   Patient Active Problem List    Diagnosis Date Noted    Heart failure ([address] 12/18/2020    CHF (congestive heart failure) ([address] 12/18/2020    Acute otitis externa 10/28/2020    Anxiety 10/28/2020    Diastasis recti 54/92/1379    Diastolic dysfunction 79/64/2339    Diverticulitis 10/28/2020    Pleurisy 10/28/2020    H/O allergy 10/28/2020    Hepatic steatosis 10/28/2020    Hypersomnia 10/28/2020    Inadequate sleep hygiene 10/28/2020    Lung nodule 10/28/2020    Obstructive chronic bronchitis with acute exacerbation ([address] 10/28/2020    Osteoarthritis 10/28/2020    Osteoporosis 10/28/2020    Periodic limb movements of sleep 10/28/2020    Racing heart beat 10/28/2020    Right upper quadrant abdominal pain 10/28/2020    Urinary frequency 10/28/2020    Urinary tract infection 10/28/2020    Vitamin D deficiency 10/28/2020    Severe persistent asthma with exacerbation 08/21/2020    Pneumonia due to COVID-19 virus 08/07/2020    COVID-19 virus infection 07/22/2020    Acute respiratory disease due to COVID-19 virus 07/22/2020    Hypoxia 07/09/2020    COVID-19 07/09/2020    Hypokalemia 07/09/2020    Atelectasis of right lung 12/14/2018    Acute exacerbation of chronic obstructive pulmonary disease (COPD) ([address] 10/07/2018    Bilateral carotid bruits 07/30/2018    Palpitations 07/30/2018    Type 2 diabetes with nephropathy ([address] 05/30/2018    Hyperlipidemia 01/24/2018    COPD with acute bronchitis ([address] 07/04/8800    Diastolic CHF, chronic ([address] 02/09/2017    Diverticulosis 02/09/2017    COPD exacerbation ([address] 02/08/2017    Acute exacerbation of COPD with asthma ([address] 02/08/2017    Obesity, Class III, BMI 40-49.9 (morbid obesity) ([facility] 08/09/2016    Chest pain 08/09/2016    Dyspnea 08/09/2016    COPD with acute exacerbation (HCC) 05/24/2015    COPD (chronic obstructive pulmonary disease) (HCC) 03/27/2015    Allergic rhinitis 03/27/2015    Essential hypertension, benign 09/30/2012    Diabetes mellitus, type 2 ([facility] 09/30/2012    Esophageal reflux 09/30/2012    SHERRIE on CPAP     Pneumonia 11/16/2009    Status asthmaticus 09/21/2009        The  provided the following Interventions:  Initiated a relationship of care and support. Explored issues of scout, belief, spirituality and Rastafarian/ritual needs while hospitalized. Listened empathically. Provided information about Spiritual Care Services. Offered prayer and assurance of continued prayers on patient's behalf. Chart reviewed. The following outcomes where achieved:  Patient shared limited information about both their medical narrative and spiritual journey/beliefs.  confirmed Patient's [employer] Affiliation. Patient processed feeling about current hospitalization. Patient expressed gratitude for 's visit. Assessment:  Patient does not have any Rastafarian/cultural needs that will affect patients preferences in health care. There are no spiritual or Rastafarian issues which require intervention at this time. Plan:  Chaplains will continue to follow and will provide pastoral care on an as needed/requested basis.  recommends bedside caregivers page  on duty if patient shows signs of acute spiritual or emotional distress.       [address] [facility]   [phone]

## 2021-07-01 NOTE — DISCHARGE SUMMARY
[facility]  Discharge Summary    Patient: Lucho Yuan MRN: [id number]  CSN: [id number]    YOB: 1960  Age: 63 y.o. Sex: female      Admission Date: 6/28/2021 Discharge Date: 7/01/2021   Attending: No att. providers found PCP: Jose Pedroza MD     ===================================================================    Reason for Admission: COPD exacerbation [facility]) [J44.1]    Discharge Diagnoses:   Acute exacerbation of COPD (improving)   Chronic hypoxic respiratory failure  Severe persistent asthma with steroid dependence  Congestive Heart Failure  Type 2 Diabetes Mellitus  Morbid obesity: Body mass index is 46.23 kg/m². Obesity hypoventilation syndrome with SHERRIE: Patient on trilogy    Important notes to PCP/ follow-up studies and evaluations   Patient discharged home on a tapering dose of prednisone for 25 days, to presume home dose of prednisone after taper (5mg and 2.5mg alternating days)    Home insulin regimen adjusted to Lantus 5units in the morning and 50units in the evening on discharge for management of hyperglycemia associated with high dose steroids. Monitor blood sugars and discontinue Lantus 5units (every morning) as appropriate once patient completes steroid taper. Patient to keep outpatient pulmonology appointment in August 2021    Pending labs and studies:  none    Operative Procedures:   none    Discharge Medications:     Discharge Medication List as of 7/1/2021  5:27 PM      START taking these medications    Details   insulin glargine (LANTUS) 100 unit/mL injection 5 Units by SubCUTAneous route Every morning. Indications: type 2 diabetes mellitus, Print, Disp-1 Vial, R-0         CONTINUE these medications which have CHANGED    Details   predniSONE (DELTASONE) 10 mg tablet Take 10 mg by mouth daily.  Take 4 tablets (40mg) for 5-days  Followed by 2 tablets (20mg) for 5-days   Followed by 1 tablets (10mg) for 5-days   Followed by 0.5 tablets (5mg) for 10-days, Print, Disp-40 Tablet, R-0         CONTINUE these medications which have NOT CHANGED    Details   azithromycin (ZITHROMAX) 250 mg tablet Take 1 Tab by mouth every Monday, Wednesday, Friday., Normal, Disp-30 Tab, R-3      tiotropium bromide (Spiriva Respimat) 2.5 mcg/actuation inhaler Take 2 Puffs by inhalation daily. , Normal, Disp-3 Inhaler, R-3      fluticasone propion-salmeteroL (ADVAIR HFA) 230-21 mcg/actuation inhaler Take 2 Puffs by inhalation two (2) times a day., Normal, Disp-3 Each, R-3      fluticasone propionate (FLOVENT HFA) 220 mcg/actuation inhaler Take 1 Puff by inhalation two (2) times a day. Rinse and gargle after each use, Normal, Disp-3 Inhaler, R-3      Prolia 60 mg/mL injection INJECT 60MG SUBCUTANEOUSLY AS A SINGLE DOSE ONCE EVERY 6 MONTHS, Historical Med, ERLINDA      simethicone (GAS-X) 125 mg capsule Take 125 mg by mouth four (4) times daily as needed for Flatulence., Historical Med      insulin glargine (LANTUS,BASAGLAR) 100 unit/mL (3 mL) inpn 50 Units by SubCUTAneous route nightly. Indications: type 2 diabetes mellitus, Historical Med      furosemide (LASIX) 40 mg tablet Take 1 Tab by mouth two (2) times a day., Normal, Disp-60 Tab,R-6      albuterol-ipratropium (DUO-NEB) 2.5 mg-0.5 mg/3 ml nebu 3 mL by Nebulization route every six (6) hours as needed for Wheezing., Historical Med      glucose blood VI test strips (FreeStyle Lite Strips) strip Use four times daily for blood sugar checks., Historical Med      Blood-Glucose Meter (FreeStyle Freedom Lite) monitoring kit CHECK BLOOD SUGARS B.I.D. E11.9, Historical Med      fluticasone propionate (FLONASE) 50 mcg/actuation nasal spray 2 Sprays by Nasal route daily. , Historical Med      lancets (FreeStyle Lancets) 28 gauge misc Use four times daily for blood sugar checks, Historical Med      Insulin Needles, Disposable, (BD Ultra-Fine Mini Pen Needle) 31 gauge x 3/16\" ndle Use with Basaglar once daily. , Historical Med Insulin Syringe-Needle U-100 0.3 mL 31 gauge x 5/16\" syrg USE AS DIRECTED, Historical Med      albuterol (PROVENTIL HFA, VENTOLIN HFA, PROAIR HFA) 90 mcg/actuation inhaler Take 2 Puffs by inhalation every four (4) hours as needed for Wheezing., Print, Disp-1 Inhaler, R-0      omeprazole (PRILOSEC) 40 mg capsule Take 40 mg by mouth daily. , Historical Med      lisinopril (PRINIVIL, ZESTRIL) 20 mg tablet Take 20 mg by mouth daily. , Historical Med      OXYGEN-AIR DELIVERY SYSTEMS 2 L by Nasal route continuous. First Choice between 2L and 3L, Historical Med      aspirin delayed-release 81 mg tablet Take 81 mg by mouth daily. , Historical Med      montelukast (SINGULAIR) 10 mg tablet Take 10 mg by mouth nightly., Historical Med      acetaminophen (TYLENOL) 500 mg tablet Take 500 mg by mouth every six (6) hours as needed for Fever or Pain., Historical Med      NOVOLOG FLEXPEN U-100 INSULIN 100 unit/mL inpn Continue home Sliding scale insulin as prior to admission, Print, Disp-1 Pen, R-0, ERLINDA         STOP taking these medications       levoFLOXacin (LEVAQUIN) 500 mg tablet Comments:   Reason for Stopping:               Disposition: Home with Home Health    Consultants: [address] Course (including pertinent history and physical findings)  Reason for admission: COPD excerbation    63 y.o. female with PMH COPD on home O2 (3L NC), IDDM2, HTN, HFpEF, SHERRIE on CPAP, GERD, allergic rhinitis now admitted with COPD excerbation    COPD exacerbation (improving), Chronic Hypoxic Respiratory failure, Severe persistent asthma with steroid dependence: SOB for 1-week with no improvement on outpatient therapy. DDx for dyspnea include COPD exacerbation vs. CHF exacerbation vs. Infectious (PNA vs. URI). Currently on Fasenra 30mg SQH every 8 weeks, last injection 6/22/2021 with improvement in COPD symptoms since that time.  Followed by pulm (Dr. Kelly Higginbotham) out patient.  CXR showed acute cardiopulmonary process, unchanged patchy bibasilar opacities, favor atelectasis. Respiratory panel negative. Procalcitonin (<0.05) which supports a non-infectious etiology. Patient admitted and pulmonology consulted. Patient started on IV Solumedrol which was then transitioned to PO prednisone. Patient received Levaquin 750mg IV (x2-days) empirically which was discontinued with negative procalcitionin. he was continued on home azithromycin M, W, F  Further inpatient management included brovana and pulmicort nebulizers, duonebs, supplemental oxygen and BiPAP at night. She was discharged on a 25-day tapering dose of prednisone. Patient to resume home dose of prednisone 5mg and 2.5mg on alternating days. Other concerns addressed during this admission:  Obesity hypoventilation syndrome with SHERRIE: Patient on trilogy. Patient supplied with hospital BiPAP machine at night.     Morbid obesity: Body mass index is 46.23 kg/m².     HFpEF: ECHO (5/24/20) ZD 73 - 70% with no regional wall motion abnormality, Moderate (grade 2) left ventricular diastolic dysfunction, Mildly dilated left atrium and Pulmonary arterial systolic pressure is 61 mmHg. Repeat  Echo (6/29) EF is 55 - 60%, Mild concentric hypertrophy and Pulmonary arterial systolic pressure 51 mmHg. Pro-BNP not elevated (62). Troponins negative on admission EKG NSR, RBBB. Dyspnea unlikely due to CHF exacerbation or cardiac eitology. Continued on home Lasix 40mg daily     Insulin dependent Type 2 Diabetes: Glucose on admission 193. HbA1c 7.9 at admission. Chronic use of steroids may be contributing to hyperglycemia. Continued on  home Lantus at 50 U at night. An additional Lantus 5 units AM and Lispro 3 units TIDAC as well as correctional scale was added for hyperglycemia management on high dose steroids. Patient discharged on Lantus 5units in the morning and 50units in the evening on discharge for management of hyperglycemia associated with high dose steroids.  Monitor blood sugars and discontinue Lantus 5units (every morning) as appropriate once steroid taper complete.     Hypertension: Continued on home Lisinopril 20mg daily     GERD: Currently asymptomatic, substitute home ompeprazole with protonix 40mg daily while inpatient. Resume Protonix at discharge. CURRENT ADMISSION IMAGING RESULTS   XR CHEST SNGL V    Result Date: 6/28/2021  1. No radiographic evidence of acute cardiopulmonary process. 2.  Unchanged patchy bibasilar opacities, favor atelectasis. Cardiology Procedures/Testing:  MODALITY RESULTS   EKG Results for orders placed or performed during the hospital encounter of 06/28/21   EKG, 12 LEAD, INITIAL   Result Value Ref Range    Ventricular Rate 76 BPM    Atrial Rate 76 BPM    P-R Interval 152 ms    QRS Duration 130 ms    Q-T Interval 426 ms    QTC Calculation (Bezet) 479 ms    Calculated P Axis 68 degrees    Calculated R Axis 38 degrees    Calculated T Axis 19 degrees    Diagnosis       Normal sinus rhythm  Right bundle branch block  Abnormal ECG  When compared with ECG of 29-APR-2021 17:48,  No significant change was found  Confirmed by Harmony Florian MD, ----- ([id number]) on 6/28/2021 5:03:51 PM         ECHO 06/28/21    ECHO ADULT FOLLOW-UP OR LIMITED 06/29/2021 6/29/2021    Interpretation Summary  · LV: Estimated LVEF is 55 - 60%. Visually measured ejection fraction. Normal cavity size and systolic function (ejection fraction normal). Mild concentric hypertrophy. · PA: Moderate pulmonary hypertension. Pulmonary arterial systolic pressure is 51 mmHg. · Image quality for this study was technically difficult. · Contrast used: DEFINITY. Signed by: Candie Ngo MD on 6/29/2021  2:58 PM     Nuclear Medicine Results from Hospital Encounter encounter on 09/09/13    NM LUNG PERFUSION W VENT    Impression  :    Low probability of pulmonary embolism.       Results from [place] encounter on 09/04/13    TRANSCRIBED NUCLEAR MEDICINE      Results from [facility] encounter on 12/06/12    NM LUNG VENT/PERF    Impression  :    Very low probability for pulmonary embolus. IR No results found for this or any previous visit. CATH 12/18/20    CARDIAC PROCEDURE 12/22/2020 12/22/2020    Conclusion  Elevated LVEDP. Normal LV function. Moderate 2 vessel coronary artery disease best treated medically  Continue intense risk factor modification. Signed by: Kaur Wisdom MD on 12/22/2020  5:24 PM       Special Testing/Procedures:  MODALITY RESULTS   MICRO All Micro Results     Procedure Component Value Units Date/Time    RESPIRATORY VIRUS PANEL W/COVID-19, PCR [[id number]] Collected: 06/28/21 1725    Order Status: Completed Specimen: Nasopharyngeal Updated: 06/28/21 1911     Adenovirus Not detected        Coronavirus 229E Not detected        Coronavirus HKU1 Not detected        Coronavirus CVNL63 Not detected        Coronavirus OC43 Not detected        SARS-CoV-2, PCR Not detected        Metapneumovirus Not detected        Rhinovirus and Enterovirus Not detected        Influenza A Not detected        Influenza A, subtype H1 Not detected        Influenza A, subtype H3 Not detected        INFLUENZA A H1N1 PCR Not detected        Influenza B Not detected        Parainfluenza 1 Not detected        Parainfluenza 2 Not detected        Parainfluenza 3 Not detected        Parainfluenza virus 4 Not detected        RSV by PCR Not detected        B. parapertussis, PCR Not detected        Bordetella pertussis - PCR Not detected        Chlamydophila pneumoniae DNA, QL, PCR Not detected        Mycoplasma pneumoniae DNA, QL, PCR Not detected            ABG Lab Results   Component Value Date/Time    pH (POC) 7.43 03/30/2021 05:12 PM    pCO2 (POC) 43.3 03/30/2021 05:12 PM    pO2 (POC) 104 (H) 03/30/2021 05:12 PM    HCO3 (POC) 28.8 (H) 03/30/2021 05:12 PM    FIO2 (POC) 28 03/30/2021 05:12 PM      UA No results found for this or any previous visit.      Laboratory Results:  LABORATORY RESULTS HEMATOLOGY Lab Results   Component Value Date/Time    WBC 10.1 07/01/2021 04:50 AM    HGB 11.4 (L) 07/01/2021 04:50 AM    HCT 35.1 07/01/2021 04:50 AM    PLATELET 296 58/67/2927 04:50 AM    MCV 86.2 07/01/2021 04:50 AM       CHEMISTRIES Lab Results   Component Value Date/Time    Sodium 137 07/01/2021 04:50 AM    Potassium 3.7 07/01/2021 04:50 AM    Chloride 102 07/01/2021 04:50 AM    CO2 24 07/01/2021 04:50 AM    Anion gap 11 07/01/2021 04:50 AM    Glucose 198 (H) 07/01/2021 04:50 AM    BUN 25 (H) 07/01/2021 04:50 AM    Creatinine 0.98 07/01/2021 04:50 AM    BUN/Creatinine ratio 26 (H) 07/01/2021 04:50 AM    GFR est AA >60 07/01/2021 04:50 AM    GFR est non-AA 58 (L) 07/01/2021 04:50 AM    Calcium 9.3 07/01/2021 04:50 AM      HEPATIC FUNCTION Lab Results   Component Value Date/Time    Albumin 3.5 07/01/2021 04:50 AM    Bilirubin, direct <0.1 02/08/2017 10:00 PM    Bilirubin, total 0.4 07/01/2021 04:50 AM    Protein, total 7.3 07/01/2021 04:50 AM    Globulin 3.8 07/01/2021 04:50 AM    A-G Ratio 0.9 07/01/2021 04:50 AM    ALT (SGPT) 32 07/01/2021 04:50 AM    Alk.  phosphatase 86 07/01/2021 04:50 AM       LACTIC ACID Lab Results   Component Value Date/Time    Lactic acid 1.8 08/12/2020 01:57 AM    Lactic acid 1.6 08/11/2020 04:33 AM    Lactic acid 4.8 (HH) 08/10/2020 03:26 AM      CARDIAC PANEL Lab Results   Component Value Date/Time     06/28/2021 10:40 AM    CK - MB <1.0 06/28/2021 10:40 AM    CK-MB Index  06/28/2021 10:40 AM     CALCULATION NOT PERFORMED WHEN RESULT IS BELOW LINEAR LIMIT    Troponin-I, QT <0.02 06/28/2021 10:40 AM      NT-proBNP Lab Results   Component Value Date/Time    NT pro-BNP 62 06/28/2021 10:40 AM    NT pro-BNP 89 03/30/2021 03:50 PM    NT pro-BNP 55 12/22/2020 03:16 AM    NT pro-BNP 50 12/18/2020 05:30 PM    NT pro-BNP 45 08/07/2020 03:10 PM      THYROID Lab Results   Component Value Date/Time    TSH 2.29 12/09/2020 03:19 PM      LIPID PANEL Lab Results   Component Value Date/Time Cholesterol, total 260 (H) 12/20/2020 12:32 AM    HDL Cholesterol 55 12/20/2020 12:32 AM    LDL, calculated 146.8 (H) 12/20/2020 12:32 AM    VLDL, calculated 58.2 12/20/2020 12:32 AM    Triglyceride 291 (H) 12/20/2020 12:32 AM    CHOL/HDL Ratio 4.7 12/20/2020 12:32 AM         RISK CALCULATORS:  SCORE RESULT   ASCVD The 10-year ASCVD risk score (Sonya Rodriguez et al., 2013) is: 10.2%    Values used to calculate the score:      Age: 63 years      Sex: Female      Is Non- : No      Diabetic: Yes      Tobacco smoker: No      Systolic Blood Pressure: 572 mmHg      Is BP treated: Yes      HDL Cholesterol: 55 MG/DL      Total Cholesterol: 260 MG/DL    [id number]     HAS-BLED    READMISSION RISK SCORE Medium Risk            16 Total Score    3 Has Seen PCP in Last 6 Months (Yes=3, No=0)    4 IP Visits Last 12 Months (1-3=4, 4=9, >4=11)    9 Pt. Coverage (Medicare=5 , Medicaid, or Self-Pay=4)        Criteria that do not apply:    . Living with Significant Other. Assisted Living. LTAC. SNF.  or   Rehab    Patient Length of Stay (>5 days = 3)    Charlson Comorbidity Score (Age + Comorbid Conditions)           Functional status and cognitive function:    Ambulates:  indepdently  Status: alert, cooperative, no distress, appears stated age  Condition: STABLE  Disposition: Home with Home Health    Diet: General Diet    Code status and advanced care plan: Full     Point of Contact Sheree Varghese  Relationship: Daughter  [phone]        Patient Education:  Patient was educated on the following topics prior to discharge:  - COPD Exacerbation      Follow-up:   Follow-up Information     Follow up With Specialties Details Why Contact Info    [address]  On 7/5/2021 Hospital Follow-up Appointment at 2pm with Dr. Hyman [place] Severa Salk, MD Pulmonary Disease, Urgent Care, Internal Medicine  office closed as of now we will call in am with appt/time [address] [facility], [address], [address]   [address] [facility]  [phone]      Peter Mayfield MD Pulmonary Disease, Internal Medicine Schedule an appointment as soon as possible for a visit Patient to follow-up with pulmonology at appointment in August 2021 [address]  [facility] [address]            =================================================================    Maritza Meléndez MD, PGY-2   [facility]   Intern Pager: [phone]   July 10, 2021

## 2021-07-01 NOTE — PROGRESS NOTES
6 minute walk test performed. Room air at rest, oxygen saturation 96%. Room air while ambulating, O2 saturation dropped to 90% by 4 minutes. Placed on 2L oxygen, recovered to 98% standing at 6 minutes.

## 2021-08-01 DIAGNOSIS — J45.51 SEVERE PERSISTENT ASTHMA WITH EXACERBATION: ICD-10-CM

## 2021-08-03 PROBLEM — I50.9 CHF (CONGESTIVE HEART FAILURE) (HCC): Status: RESOLVED | Noted: 2020-12-18 | Resolved: 2021-08-03

## 2021-08-08 DIAGNOSIS — J45.51 SEVERE PERSISTENT ASTHMA WITH EXACERBATION: Primary | ICD-10-CM

## 2021-08-09 ENCOUNTER — TELEPHONE (OUTPATIENT)
Dept: PULMONOLOGY | Age: 61
End: 2021-08-09

## 2021-08-09 NOTE — TELEPHONE ENCOUNTER
Pulmonary Rehab called patient and spoke to her about the program. She is interested and was informed of the 6-8 week waitlist. Will follow up to schedule when we have an opening.      Thank you,  Connor Cardoso

## 2021-08-17 ENCOUNTER — HOSPITAL ENCOUNTER (OUTPATIENT)
Dept: INFUSION THERAPY | Age: 61
Discharge: HOME | End: 2021-08-17
Payer: MEDICARE

## 2021-08-17 VITALS
HEART RATE: 96 BPM | DIASTOLIC BLOOD PRESSURE: 71 MMHG | OXYGEN SATURATION: 98 % | SYSTOLIC BLOOD PRESSURE: 158 MMHG | RESPIRATION RATE: 24 BRPM | TEMPERATURE: 97.4 F

## 2021-08-17 DIAGNOSIS — J45.51 SEVERE PERSISTENT ASTHMA WITH EXACERBATION: Primary | ICD-10-CM

## 2021-08-17 PROCEDURE — 74011250636 HC RX REV CODE- 250/636: Performed by: INTERNAL MEDICINE

## 2021-08-17 PROCEDURE — 96372 THER/PROPH/DIAG INJ SC/IM: CPT

## 2021-08-17 RX ADMIN — BENRALIZUMAB 30 MG: 30 INJECTION, SOLUTION SUBCUTANEOUS at 10:15

## 2021-08-17 NOTE — PROGRESS NOTES
[facility] Progress Note    Date: 2021    Name: Christel Cook    MRN: [id number]         : 1960    Ms. Rome Bell arrived in the [facility] today at 1000 for her FASENRA Injection (Every 8 Weeks). She was assessed and education including discharge instructions was discussed with Ms Rome Bell. She verbalized understanding. .     Ms. Paula Pinzon vitals were reviewed. Visit Vitals  BP (!) 158/71 (BP 1 Location: Left lower arm, BP Patient Position: Sitting)   Pulse 96   Temp 97.4 °F (36.3 °C)   Resp 24   SpO2 98%   Breastfeeding No       Benralizumab (FASENRA) 30 mg, was administered SQ, in the back of her left arm. Ms. Rome Bell tolerated well and voiced no complaints. Ms. Rome Bell was discharged from [place] [address] in stable condition at 1020. She is to return on 10/12/2021 at 1000 for her next [facility] appt.     Raphael Terry RN  2021  10:20 AM

## 2021-08-18 ENCOUNTER — OFFICE VISIT (OUTPATIENT)
Dept: CARDIOLOGY CLINIC | Age: 61
End: 2021-08-18
Payer: MEDICARE

## 2021-08-18 VITALS
HEIGHT: 63 IN | WEIGHT: 261 LBS | HEART RATE: 88 BPM | OXYGEN SATURATION: 95 % | DIASTOLIC BLOOD PRESSURE: 66 MMHG | SYSTOLIC BLOOD PRESSURE: 148 MMHG | BODY MASS INDEX: 46.25 KG/M2

## 2021-08-18 DIAGNOSIS — I27.20 PULMONARY HTN (HCC): ICD-10-CM

## 2021-08-18 DIAGNOSIS — J44.9 CHRONIC OBSTRUCTIVE PULMONARY DISEASE, UNSPECIFIED COPD TYPE (HCC): ICD-10-CM

## 2021-08-18 DIAGNOSIS — E66.01 MORBID OBESITY (HCC): ICD-10-CM

## 2021-08-18 DIAGNOSIS — I25.10 CORONARY ARTERY DISEASE INVOLVING NATIVE CORONARY ARTERY OF NATIVE HEART WITHOUT ANGINA PECTORIS: ICD-10-CM

## 2021-08-18 DIAGNOSIS — I10 ESSENTIAL HYPERTENSION, BENIGN: ICD-10-CM

## 2021-08-18 DIAGNOSIS — I50.32 DIASTOLIC CHF, CHRONIC (HCC): Primary | ICD-10-CM

## 2021-08-18 PROCEDURE — G8753 SYS BP > OR = 140: HCPCS | Performed by: INTERNAL MEDICINE

## 2021-08-18 PROCEDURE — G8427 DOCREV CUR MEDS BY ELIG CLIN: HCPCS | Performed by: INTERNAL MEDICINE

## 2021-08-18 PROCEDURE — 3017F COLORECTAL CA SCREEN DOC REV: CPT | Performed by: INTERNAL MEDICINE

## 2021-08-18 PROCEDURE — G8432 DEP SCR NOT DOC, RNG: HCPCS | Performed by: INTERNAL MEDICINE

## 2021-08-18 PROCEDURE — G8754 DIAS BP LESS 90: HCPCS | Performed by: INTERNAL MEDICINE

## 2021-08-18 PROCEDURE — G9899 SCRN MAM PERF RSLTS DOC: HCPCS | Performed by: INTERNAL MEDICINE

## 2021-08-18 PROCEDURE — 99214 OFFICE O/P EST MOD 30 MIN: CPT | Performed by: INTERNAL MEDICINE

## 2021-08-18 PROCEDURE — G8417 CALC BMI ABV UP PARAM F/U: HCPCS | Performed by: INTERNAL MEDICINE

## 2021-08-18 NOTE — PROGRESS NOTES
HISTORY OF PRESENT ILLNESS  Kate Romero is a 63 y.o. female. 3/17/2021 - Ms. Anthony Orona presents to f/u for edema. She c/o dizziness after trying Metolazone for 2 days, therefore stopped taking it.    4/21/2021  Patient presents to f/u for SOB and edema. She was again unable to tolerate Metolazone due to dizziness. She has continued to take lasix BID. She is following low sodium diet and weight is down 5 lbs. She continues to f/u with pulmonary and wears continuous oxygen. Follow-up  Associated symptoms include shortness of breath. Hypertension  The history is provided by the patient. This is a chronic problem. The problem has not changed since onset. Associated symptoms include shortness of breath. CHF  The history is provided by the patient. This is a chronic problem. The problem occurs daily. The problem has been gradually worsening. Associated symptoms include shortness of breath. Shortness of Breath  The history is provided by the patient. This is a chronic problem. The problem occurs frequently. The problem has been gradually worsening. Associated symptoms include leg swelling. Pertinent negatives include no fever, no ear pain, no neck pain, no cough, no sputum production, no hemoptysis, no wheezing, no PND, no orthopnea, no vomiting, no rash and no claudication. Associated medical issues include COPD. Review of Systems   Constitutional: Negative for chills, diaphoresis, fever and weight loss. HENT: Negative for ear pain and hearing loss. Eyes: Negative for blurred vision. Respiratory: Positive for shortness of breath. Negative for cough, hemoptysis, sputum production, wheezing and stridor. Cardiovascular: Positive for leg swelling. Negative for palpitations, orthopnea, claudication and PND. Gastrointestinal: Negative for heartburn, nausea and vomiting. Musculoskeletal: Negative for myalgias and neck pain. Skin: Negative for rash.    Neurological: Negative for dizziness, tingling, tremors, focal weakness, loss of consciousness and weakness. Psychiatric/Behavioral: Negative for depression and suicidal ideas. Physical Exam  Vitals and nursing note reviewed. Constitutional:       Appearance: She is well-developed. HENT:      Head: Normocephalic and atraumatic. Eyes:      General: No scleral icterus. Conjunctiva/sclera: Conjunctivae normal.   Neck:      Vascular: No JVD. Cardiovascular:      Rate and Rhythm: Normal rate and regular rhythm. Heart sounds: Normal heart sounds. No murmur heard. No friction rub. No gallop. Pulmonary:      Effort: Pulmonary effort is normal. No respiratory distress. Breath sounds: No stridor. No wheezing or rales. Chest:      Chest wall: No tenderness. Abdominal:      General: There is no distension. Tenderness: There is no abdominal tenderness. Musculoskeletal:         General: No tenderness. Normal range of motion. Cervical back: Normal range of motion and neck supple. Lymphadenopathy:      Cervical: No cervical adenopathy. Skin:     Findings: No erythema or rash. Neurological:      Mental Status: She is alert and oriented to person, place, and time. Cranial Nerves: No cranial nerve deficit. Psychiatric:         Behavior: Behavior normal.       ekg sinus rhythm with non specific st-t changes  05/21/20   ECHO ADULT COMPLETE 05/25/2020 5/25/2020    Narrative · Image quality for this study was technically difficult. Contrast used:   DEFINITY. · Normal cavity size and systolic function (ejection fraction normal). Moderate concentric hypertrophy. Estimated left ventricular ejection   fraction is 65 - 70%. Visually measured ejection fraction. No regional   wall motion abnormality noted. Moderate (grade 2) left ventricular   diastolic dysfunction. · Mildly dilated left atrium. · Pulmonary hypertension. Pulmonary arterial systolic pressure is 61 mmHg.   · Severely elevated central venous pressure (15+ mmHg); IVC diameter is   larger than 21 mm and collapses less than 50% with respiration. Signed by: Vita Dumas MD     12/18/20   CARDIAC PROCEDURE 12/22/2020 12/22/2020    Narrative Elevated LVEDP. Normal LV function. Moderate 2 vessel coronary artery disease best treated medically  Continue intense risk factor modification. Signed by: Tong Ingram MD      Echo 3/22/21  Interpretation Summary  · Image quality for this study was suboptimal. Contrast used: DEFINITY. · LV: Estimated LVEF is 60 - 65%. Normal cavity size and systolic function (ejection fraction normal). Mild concentric hypertrophy. Mild (grade 1) left ventricular diastolic dysfunction E/E' ratio is 14.15.  · LA: Mildly dilated left atrium. · RV: Mildly dilated right ventricle. · RA: Mildly dilated right atrium. · PA: Pulmonary arterial systolic pressure is 27 mmHg. Pulmonary hypertension not suggested by Doppler findings. · Pericardium: Pericardial fat pad present. Interpretation Summary 6/2021    · LV: Estimated LVEF is 55 - 60%. Visually measured ejection fraction. Normal cavity size and systolic function (ejection fraction normal). Mild concentric hypertrophy. · PA: Moderate pulmonary hypertension. Pulmonary arterial systolic pressure is 51 mmHg. · Image quality for this study was technically difficult. · Contrast used: DEFINITY. ASSESSMENT and PLAN    ICD-10-CM [id number]    1. Diastolic CHF, chronic (HCC)  I50.32 428.32      428.0     Stable compensated continue treatment shortness of breath class III which is multifactorial   2. Pulmonary HTN (HCC)  I27.20 416.8     Stable. Group 2 and 3 continue treatment   3. Essential hypertension, benign  S77 171.1     Systolic pressure elevated today. Usually runs normal will monitor   4. Chronic obstructive pulmonary disease, unspecified COPD type ([facility]  J44.9 496     Continue current treatment. Monitor with PCP and pulmonary   5.  Morbid obesity ([facility]  E66.01 278.01     Continue dietary modification. Exercise   6. Coronary artery disease involving native coronary artery of native heart without angina pectoris  I25.10 414.01     Moderate CAD on cath. Allergy to statin. Check LDL and consider alternative     No orders of the defined types were placed in this encounter. Follow-up and Dispositions    · Return in about 6 months (around 2/18/2022) for follow up with Dr Desi Elizabeth. reviewed diet, exercise and weight control. Patient is a 63 yr old female with severe COPD on meds seen for worsening dyspnea--   Recent cath revealed moderate two-vessel coronary artery disease with significant elevated left ventricular end-diastolic pressure. She was started on Lasix 40 mg twice daily. Returns to clinic for follow-up. Complains of mild worsening exertional dyspnea and lower extremity edema. We will add metolazone 5 mg daily for 3 days and then 3 times per week. Repeat BMP/mag to evaluate renal function. 3/17/2021  Ms. Ubaldo Mcclendon reports was unable to tolerate Metolazone due to dizziness - she felt this was dropping her b/p, therefore she did not take it  She reports worsening SOB and fluid retention since she had COVID in July  Will resume Metolazone 2.5 mg 30 minutes prior to lasix, continue lasix BID. Take 3 days consecutively then decrease to 2-3 times weekly  On days she takes Metolazone, decrease lisinopril to 10 mg in effort to prevent hypotension. Check BMP ( had drawn today with [facility])  Low sodium diet  Check Echo   F/u in 1 month post testing.    4/21/2021  Patient was unable to tolerate Metolazone and did not take it. She has continued to take Lasix 40 mg BID, and lisinopril 20 mg / day  Echo reviewed and discussed with patient - normal LV function 60-65%, mild 1 DD and normal PA pressures of 27 mm Hg  BMP reviewed and discussed with patient  Recommend to continue current medications  8/2021  Cardiac status stable. Edema is stable. Shortness of breath unchanged. Currently not on statin had moderate CAD in past.  Consider Repatha. Follow-up lab with PCP and based on that she will decide with PCP on treatment.

## 2021-08-18 NOTE — PROGRESS NOTES
1. Have you been to the ER, urgent care clinic since your last visit? Hospitalized since your last visit? Yes Where: 6/[facility] COPD    2. Have you seen or consulted any other health care providers outside of the [address] [place] [address] since your last visit? Include any pap smears or colon screening.  Yes Where: Pulmonary h/f / PCP Routine

## 2021-08-23 ENCOUNTER — TRANSCRIBE ORDER (OUTPATIENT)
Dept: SCHEDULING | Age: 61
End: 2021-08-23

## 2021-08-23 DIAGNOSIS — Z12.31 VISIT FOR SCREENING MAMMOGRAM: Primary | ICD-10-CM

## 2021-08-31 ENCOUNTER — OFFICE VISIT (OUTPATIENT)
Dept: PULMONOLOGY | Age: 61
End: 2021-08-31
Payer: MEDICARE

## 2021-08-31 VITALS
RESPIRATION RATE: 24 BRPM | BODY MASS INDEX: 46.78 KG/M2 | SYSTOLIC BLOOD PRESSURE: 162 MMHG | OXYGEN SATURATION: 94 % | DIASTOLIC BLOOD PRESSURE: 74 MMHG | TEMPERATURE: 97.6 F | HEIGHT: 63 IN | WEIGHT: 264 LBS | HEART RATE: 77 BPM

## 2021-08-31 DIAGNOSIS — J96.11 CHRONIC RESPIRATORY FAILURE WITH HYPOXIA AND HYPERCAPNIA (HCC): ICD-10-CM

## 2021-08-31 DIAGNOSIS — I27.20 PULMONARY HYPERTENSION (HCC): ICD-10-CM

## 2021-08-31 DIAGNOSIS — R06.02 SHORTNESS OF BREATH: ICD-10-CM

## 2021-08-31 DIAGNOSIS — E66.2 OBESITY HYPOVENTILATION SYNDROME (HCC): ICD-10-CM

## 2021-08-31 DIAGNOSIS — E66.01 MORBID OBESITY (HCC): ICD-10-CM

## 2021-08-31 DIAGNOSIS — J96.12 CHRONIC RESPIRATORY FAILURE WITH HYPOXIA AND HYPERCAPNIA (HCC): ICD-10-CM

## 2021-08-31 DIAGNOSIS — J45.50 SEVERE PERSISTENT ASTHMA WITHOUT COMPLICATION: ICD-10-CM

## 2021-08-31 DIAGNOSIS — R06.09 DYSPNEA ON EXERTION: ICD-10-CM

## 2021-08-31 DIAGNOSIS — J44.9 COPD WITH ASTHMA (HCC): Primary | ICD-10-CM

## 2021-08-31 PROCEDURE — G9899 SCRN MAM PERF RSLTS DOC: HCPCS | Performed by: INTERNAL MEDICINE

## 2021-08-31 PROCEDURE — G8753 SYS BP > OR = 140: HCPCS | Performed by: INTERNAL MEDICINE

## 2021-08-31 PROCEDURE — G8427 DOCREV CUR MEDS BY ELIG CLIN: HCPCS | Performed by: INTERNAL MEDICINE

## 2021-08-31 PROCEDURE — 99215 OFFICE O/P EST HI 40 MIN: CPT | Performed by: INTERNAL MEDICINE

## 2021-08-31 PROCEDURE — 3017F COLORECTAL CA SCREEN DOC REV: CPT | Performed by: INTERNAL MEDICINE

## 2021-08-31 PROCEDURE — G8754 DIAS BP LESS 90: HCPCS | Performed by: INTERNAL MEDICINE

## 2021-08-31 PROCEDURE — G8510 SCR DEP NEG, NO PLAN REQD: HCPCS | Performed by: INTERNAL MEDICINE

## 2021-08-31 PROCEDURE — G8417 CALC BMI ABV UP PARAM F/U: HCPCS | Performed by: INTERNAL MEDICINE

## 2021-08-31 RX ORDER — MONTELUKAST SODIUM 10 MG/1
10 TABLET ORAL
Qty: 90 TABLET | Refills: 3 | Status: SHIPPED | OUTPATIENT
Start: 2021-08-31

## 2021-08-31 RX ORDER — FLUTICASONE PROPIONATE 50 MCG
2 SPRAY, SUSPENSION (ML) NASAL DAILY
Qty: 3 EACH | Refills: 3 | Status: SHIPPED | OUTPATIENT
Start: 2021-08-31

## 2021-08-31 RX ORDER — PREDNISONE 5 MG/1
TABLET ORAL
Qty: 80 TABLET | Refills: 1 | Status: SHIPPED | OUTPATIENT
Start: 2021-08-31 | End: 2021-12-14

## 2021-08-31 NOTE — PROGRESS NOTES
Cynthia Read presents today for   Chief Complaint   Patient presents with    COPD     follow up from 4/27/2021    Asthma    Breathing Problem     CHAUDHARY, respiratory failure, obesity hypoventilation syndrome    Other     pulmoanry HTN    Results     CXR 6/28/2021, Echo 6/29/2021, 6 minute walk 8/31/2021    Shortness of Breath       Is someone accompanying this pt? No    Is the patient using any DME equipment during OV? Yes. NIV, O2, nebulizer, walker & wheelchair    -[address]    Depression Screening:  3 most recent [facility] 36 Screens 8/31/2021   Little interest or pleasure in doing things Not at all   Feeling down, depressed, irritable, or hopeless Not at all   Total Score PHQ 2 0       Learning Assessment:  Learning Assessment 4/9/2018   PRIMARY LEARNER Patient   HIGHEST LEVEL OF EDUCATION - PRIMARY LEARNER  SOME COLLEGE   BARRIERS PRIMARY LEARNER -   PRIMARY LANGUAGE ENGLISH   LEARNER PREFERENCE PRIMARY READING   ANSWERED BY patient Howie León   RELATIONSHIP SELF       Abuse Screening:  Abuse Screening Questionnaire 4/27/2021   Do you ever feel afraid of your partner? N   Are you in a relationship with someone who physically or mentally threatens you? N   Is it safe for you to go home? Y       Fall Risk  Fall Risk Assessment, last 12 mths 3/2/2021   Able to walk? Yes   Fall in past 12 months? 1   Do you feel unsteady? 0   Are you worried about falling 0   Number of falls in past 12 months 1   Fall with injury? 0         Coordination of Care:  1. Have you been to the ER, urgent care clinic since your last visit? Hospitalized since your last visit? Yes; Where: [facility], When: 4/29/2021-COPD exacerbation, constipation, abdominal pain & suspected COVDI-19 virus infection & 6/28-7/1/2021-COPD exacerbation    2. Have you seen or consulted any other health care providers outside of the [address] [place] [address] since your last visit? Include any pap smears or colon screening. Yes. Ny Bullock, PCP    Per patient, did not receive COVID vaccine yet.

## 2021-08-31 NOTE — PROGRESS NOTES
[address] [place] [address]  [phone]    [facility]  Pulmonary, Critical Care, and Sleep Medicine    Pulmonary Office F/U  Name: Arlene Cox 57 y.o. female  MRN: [id number]  : 1960  Service Date: 21  Chief Complaint:   Chief Complaint   Patient presents with    COPD     follow up from 2021    Asthma    Breathing Problem     CHAUDHARY, respiratory failure, obesity hypoventilation syndrome    Other     pulmoanry HTN    Results     CXR 2021, Echo 2021, 6 minute walk 2021    Shortness of Breath       History of Present Illness:  Arlene Cox is a 57 y.o. female, who presents to Pulmonary clinic for F/U of asthma/COPD. Pt last seen in our clinic on 21  In the interval, [facility]early July. Patient reports she was treated with bronchodilators and steroids again. Patient completed steroid taper. Patient is inquiring about further steroids. Patient also continuing to receive Fasenra. Pt reports worsening dyspnea - states \"I wish I could get my breathing back\". Reports mMRC of 4.   Pt compliant with controler inhalers  Pt reports she is waiting for her next prolia injection      Past Medical History:   Diagnosis Date    Asthma     Chronic lung disease     COPD     Cystocele, midline     Diabetes mellitus ([address]     GERD (gastroesophageal reflux disease)     Hidradenitis suppurativa     Hyperlipidemia     Hypertension     SHERRIE on CPAP     CPAP    Stress incontinence      Past Surgical History:   Procedure Laterality Date    HX APPENDECTOMY      HX CATARACT REMOVAL Right     HX CHOLECYSTECTOMY      HX DILATION AND CURETTAGE      Dysfunctional uterine bleeding, thought 2/2 fibroids    HX HEART CATHETERIZATION  2020    HX TUBAL LIGATION      UT BREAST SURGERY PROCEDURE UNLISTED      Right breast benign tumor removal     Family History   Problem Relation Age of Onset    Hypertension Mother     Stroke Mother     Breast Cancer Mother         Bilateral mastectomies    Cancer Mother         ovarian and breast    Heart Failure Mother     Ovarian Cancer Mother     Heart Attack Father         2011    Heart Surgery Father         CABG    Heart Failure Father     COPD Sister         Heavy smoker    Cancer Sister         ovarian    Heart Failure Sister     Lung Disease Sister     Asthma Child     Cancer Maternal Aunt         pancreatic    Cancer Maternal Grandfather         stomach     Social History     Socioeconomic History    Marital status: LEGALLY      Spouse name: Not on file    Number of children: Not on file    Years of education: Not on file    Highest education level: Not on file   Occupational History    Not on file   Tobacco Use    Smoking status: Former Smoker     Packs/day: 1.00     Years: 30.00     Pack years: 30.00     Types: Cigarettes     Start date: 1966     Quit date: 2006     Years since quittin.9    Smokeless tobacco: Former User    Tobacco comment: 1-1.5 packs per day   Vaping Use    Vaping Use: Never used   Substance and Sexual Activity    Alcohol use: No    Drug use: No    Sexual activity: Not Currently   Other Topics Concern    Not on file   Social History Narrative    Not on file     Social Determinants of Health     Financial Resource Strain:     Difficulty of Paying Living Expenses:    Food Insecurity:     Worried About Running Out of Food in the Last Year:     [address] in the Last Year:    Transportation Needs:     Lack of Transportation (Medical):      Lack of Transportation (Non-Medical):    Physical Activity:     Days of Exercise per Week:     Minutes of Exercise per Session:    Stress:     Feeling of Stress :    Social Connections:     Frequency of Communication with Friends and Family:     Frequency of Social Gatherings with Friends and Family:  Attends Advent Services:     Active Member of Clubs or Organizations:     Attends Club or Organization Meetings:     Marital Status:    Intimate Partner Violence:     Fear of Current or Ex-Partner:     Emotionally Abused:     Physically Abused:     Sexually Abused: Allergies   Allergen Reactions    Ancef [Cefazolin] Hives    Contrast Agent [Iodine] Anaphylaxis, Shortness of Breath and Swelling     Needs pre-medication for IV contrast with Benadryl, Solu-Medrol    Fish Containing Products Anaphylaxis     Pt states she had a severe allergic reaction at 9 y/o.  Statins-Hmg-Coa Reductase Inhibitors Myalgia    Metformin Other (comments)     Abdominal pain, diarrhea.  Codeine Other (comments)     Altered mental status     Prior to Admission medications    Medication Sig Start Date End Date Taking? Authorizing Provider   predniSONE (DELTASONE) 10 mg tablet Take 10 mg by mouth daily. Take 4 tablets (40mg) for 5-days  Followed by 2 tablets (20mg) for 5-days   Followed by 1 tablets (10mg) for 5-days   Followed by 0.5 tablets (5mg) for 10-days 7/1/21  Yes Tatianna Arellano MD   azithromycin (ZITHROMAX) 250 mg tablet Take 1 Tab by mouth every Monday, Wednesday, Friday. 4/28/21  Yes Tylor Jung MD   tiotropium bromide (Spiriva Respimat) 2.5 mcg/actuation inhaler Take 2 Puffs by inhalation daily. 4/27/21  Yes Tylor Jung MD   fluticasone propion-salmeteroL (ADVAIR HFA) 076-43 mcg/actuation inhaler Take 2 Puffs by inhalation two (2) times a day. 4/27/21  Yes Tylor Jung MD   Prolia 60 mg/mL injection INJECT 60MG SUBCUTANEOUSLY AS A SINGLE DOSE ONCE EVERY 6 MONTHS 11/23/20  Yes Provider, Historical   simethicone (GAS-X) 125 mg capsule Take 125 mg by mouth four (4) times daily as needed for Flatulence. Yes Provider, Historical   insulin glargine (LANTUS,BASAGLAR) 100 unit/mL (3 mL) inpn 50 Units by SubCUTAneous route nightly.  Indications: type 2 diabetes mellitus   Yes Provider, Historical furosemide (LASIX) 40 mg tablet Take 1 Tab by mouth two (2) times a day. 12/3/20  Yes Lissa Young MD   albuterol-ipratropium (DUO-NEB) 2.5 mg-0.5 mg/3 ml nebu 3 mL by Nebulization route every six (6) hours as needed for Wheezing. Yes Provider, Historical   glucose blood VI test strips (FreeStyle Lite Strips) strip Use four times daily for blood sugar checks. 6/16/17  Yes Provider, Historical   Blood-Glucose Meter (FreeStyle Freedom Lite) monitoring kit CHECK BLOOD SUGARS B.I.D. E11.9 6/16/17  Yes Provider, Historical   fluticasone propionate (FLONASE) 50 mcg/actuation nasal spray 2 Sprays by Nasal route daily. 8/26/10  Yes Provider, Historical   lancets (FreeStyle Lancets) 28 gauge misc Use four times daily for blood sugar checks 6/16/17  Yes Provider, Historical   Insulin Needles, Disposable, (BD Ultra-Fine Mini Pen Needle) 31 gauge x 3/16\" ndle Use with Basaglar once daily. 4/4/18  Yes Provider, Historical   Insulin Syringe-Needle U-100 0.3 mL 31 gauge x 5/16\" syrg USE AS DIRECTED 2/14/17  Yes Provider, Historical   acetaminophen (TYLENOL) 500 mg tablet Take 500 mg by mouth every six (6) hours as needed for Fever or Pain. Yes Provider, Historical   albuterol (PROVENTIL HFA, VENTOLIN HFA, PROAIR HFA) 90 mcg/actuation inhaler Take 2 Puffs by inhalation every four (4) hours as needed for Wheezing. 7/7/20  Yes Evonne Kate PA-C   omeprazole (PRILOSEC) 40 mg capsule Take 40 mg by mouth daily. 3/9/18  Yes Provider, Historical   lisinopril (PRINIVIL, ZESTRIL) 20 mg tablet Take 20 mg by mouth daily. Yes Provider, Historical   NOVOLOG FLEXPEN U-100 INSULIN 100 unit/mL inpn Continue home Sliding scale insulin as prior to admission 7/10/18  Yes Aníbal Swan MD   OXYGEN-AIR DELIVERY SYSTEMS 2 L by Nasal route continuous. [facility] [employer] between 2L and 3L   Yes Provider, Historical   aspirin delayed-release 81 mg tablet Take 81 mg by mouth daily.    Yes Provider, Historical   montelukast (SINGULAIR) 10 mg tablet Take 10 mg by mouth nightly. Yes Other, MD Lauren     Immunization History   Administered Date(s) Administered    Influenza Vaccine 09/21/2009, 10/21/2011, 12/21/2012, 01/07/2014, 10/20/2014, 10/01/2015, 10/07/2016, 12/31/2019, 11/18/2020    Influenza Vaccine Evolver) PF (>6 Mo Flulaval, Fluarix, and >3 Yrs Afluria, Fluzone 47560) 01/26/2018, 10/11/2018    Novel Influenza-H1N1-09, All Formulations 01/29/2010    Pneumococcal Conjugate (PCV-13) 12/31/2019    Pneumococcal Polysaccharide (PPSV-23) 10/11/2018       Review of Systems:  A complete review of systems was performed as stated in the HPI, all others are negative. Objective:    Physical Exam:  BP (!) 162/74 (BP 1 Location: Left upper arm, BP Patient Position: Sitting, BP Cuff Size: Adult)   Pulse 77   Temp 97.6 °F (36.4 °C) (Oral)   Resp 24   Ht 5' 3\" (1.6 m)   Wt 119.7 kg (264 lb)   SpO2 94%   BMI 46.77 kg/m²   Vitals were personally reviewed  Gen: no acute distress, pleasant and cooperative, sitting up in wheelchair, wearing supplemental oxygen, obese  HEENT: normocephalic/atraumatic, PERRLA, EOMI, no scleral icterus, nasal bridge midline, unable to assess nasal and oral cavities due to patient wearing mask in the setting of COVID-19 pandemic  Neck: supple, trachea midline, no JVD, no cervical and supraclavicular adenopathy  CVS: regular rate rhythm, S1/S2, no murmurs/rubs/gallops  Lungs: poor air entry B/L, CTABL, no wheezes/rales/rhonchi  Ext: trace-1+ pitting edema B/L, no peripheral cyanosis or clubbing  Psych: normal memory, thought content, and processing    Labs:   I have reviewed the patient's available labs  Lab Results   Component Value Date/Time    WBC 10.1 07/01/2021 04:50 AM    HGB 11.4 (L) 07/01/2021 04:50 AM    HCT 35.1 07/01/2021 04:50 AM    PLATELET 696 03/15/5417 04:50 AM    MCV 86.2 07/01/2021 04:50 AM     Lab Results   Component Value Date/Time    Sodium 137 07/01/2021 04:50 AM    Potassium 3.7 07/01/2021 04:50 AM    Chloride 102 07/01/2021 04:50 AM    CO2 24 07/01/2021 04:50 AM    Anion gap 11 07/01/2021 04:50 AM    Glucose 198 (H) 07/01/2021 04:50 AM    BUN 25 (H) 07/01/2021 04:50 AM    Creatinine 0.98 07/01/2021 04:50 AM    BUN/Creatinine ratio 26 (H) 07/01/2021 04:50 AM    GFR est AA >60 07/01/2021 04:50 AM    GFR est non-AA 58 (L) 07/01/2021 04:50 AM    Calcium 9.3 07/01/2021 04:50 AM    Bilirubin, total 0.4 07/01/2021 04:50 AM    Alk. phosphatase 86 07/01/2021 04:50 AM    Protein, total 7.3 07/01/2021 04:50 AM    Albumin 3.5 07/01/2021 04:50 AM    Globulin 3.8 07/01/2021 04:50 AM    A-G Ratio 0.9 07/01/2021 04:50 AM    ALT (SGPT) 32 07/01/2021 04:50 AM    AST (SGOT) 14 07/01/2021 04:50 AM     Imaging:  I have personally reviewed patient's imaging --portable chest x-ray from 3/30/2021 shows bilateral interstitial infiltrates, otherwise clear. No masses, consolidations, effusions seen. Official report per radiology:  XR Results (most recent):  Results from Hospital Encounter encounter on 06/28/21    XR CHEST SNGL V    Narrative  EXAM: XR CHEST SNGL V    INDICATION: 63 years Female. shortness of breath. ADDITIONAL HISTORY: None. TECHNIQUE: Frontal view of the chest.    COMPARISON: Chest radiograph 3/30/2021. FINDINGS:    Underpenetration limits assessment of the lung bases, left greater than right. The cardiac silhouette is within normal limits in size. Atherosclerotic  calcifications are noted in the aorta. Pulmonary vasculature appears within  normal limits. Streaky/patchy opacities at the bilateral lung bases, which are similar in  appearance to prior chest radiograph from 3/30/2021 and likely reflect  atelectasis. No definite evidence of pleural effusion or pneumothorax. No acute osseous abnormality appreciated. Impression  1. No radiographic evidence of acute cardiopulmonary process. 2.  Unchanged patchy bibasilar opacities, favor atelectasis.   CT Results (most recent):  Results from Hospital Encounter encounter on 11/02/20    CT LOW DOSE LUNG CANCER SCREENING    Narrative  CT CHEST LUNG SCREENING    INDICATION: Lung CT screening study requested as patient needs established age  criteria, smoking history requirements, or additional risk factor eligibility  requirements. CT screening study requested as patient meets established  criteria. 48 pack year smoking history. COPD. Technique: Low-dose computed tomography acquisition performed according to the  national comprehensive cancer network guidelines space on body mass index. Axial  raw images obtained. Reconstruction on lung windows and soft tissue windows with  additional coronal and sagittally reformatted series. No intravenous contrast  administered for this exam.    COMPARISON: CT abdomen/pelvis 8/31/2020, CT chest 2/21/2018    FINDINGS:  Lungs: Right middle lobe atelectasis is similar to 8/31/2020, progressed from  2018. Subsegmental atelectasis in the lingula. Mild bronchial wall thickening. 3  mm right upper lobe nodule (4, 76), not definitely seen on 2018 study. Pleura: No effusion. Lymph Nodes: No lymphadenopathy. Cardiovascular: Moderate calcified atherosclerotic disease, including the  coronary arteries. Bones: Mild degenerative changes in the spine. No suspicious osseous lesions. No  acute osseous abnormality. Visualized upper abdomen is unremarkable. Impression  IMPRESSION:    1. Right upper lobe pulmonary nodule measuring 3 mm, not definitely seen on 2018  study. Lung-RADS 2 - Benign appearance or behavior. Management: Continue annual screening with low dose CT in 12 months. 2. Right middle lobe atelectasis is similar to 8/31/2020, progressed from 2018. 3. Mild bronchial wall thickening, likely reflecting history of COPD.       PFTs:  I have reviewed the patient's PFTs -- no new studies    TTE:  I have reviewed the patient's TTE results  05/21/20   ECHO ADULT COMPLETE 05/25/2020 5/25/2020    Narrative · Image quality for this study was technically difficult. Contrast used:   DEFINITY. · Normal cavity size and systolic function (ejection fraction normal). Moderate concentric hypertrophy. Estimated left ventricular ejection   fraction is 65 - 70%. Visually measured ejection fraction. No regional   wall motion abnormality noted. Moderate (grade 2) left ventricular   diastolic dysfunction. · Mildly dilated left atrium. · Pulmonary hypertension. Pulmonary arterial systolic pressure is 61 mmHg. · Severely elevated central venous pressure (15+ mmHg); IVC diameter is   larger than 21 mm and collapses less than 50% with respiration. Signed by: Miles Jeronimo MD   Cardiac catheter result reviewed from 12/21/2020 from Dr. Guy Dela Cruz, reports that patient has elevated LVEDP (27) consistent with fluid overload/CHF, normal LV function, no wall motion abnormalities, moderate two-vessel coronary artery disease to treat medically. NIPPV/trilogy compliance report reviewed in clinic from 6/9-7/8/21: Patient has good compliance  6-minute walk test performed today in clinic, patient ambulated 134 m over 6 minutes. Lowest SPO2 was 89% on 2 L by nasal cannula. Assessment and Plan:  57 y.o. female with:    Impression:  1. COPD/asthma overlap syndrome: 2 hospitalizations this year, most recently July. Patient on benralizumab. Came off of chronic prednisone after hospitalization  2. Underlying somewhat controlled severe persistent asthma with steroid dependence: Patient has a strong asthma component with significant bronchospasm to a wide variety of classic environmental triggers, was on SCIT therapy in the past.  Despite chronic steroids, patient's absolute eosinophil count was 100 on 6/3/2020 and 200 on 3/24/2020. Patient on benralizumab injections, and notes significant improvement. Pt also on dual ICS/LABA/LAMA/LTRA therapy  3. COPD, gold risk category D  4.   Chronic hypoxic and hypercapnic respiratory failure  5. Morbid obesity: Body mass index is 46.77 kg/m². 6.  Obesity hypoventilation syndrome with SHERRIE: [facility] with good compliance  7. Dyspnea/shortness of breath: Multifactorial, due to above as well as CHFpEF  8. Chronic rhinitis  9. Chronic steroid dependence: Noted above  10. CHFpEF with previous exacerbations: Seen on transthoracic echo, grade 2 diastolic dysfunction and moderate concentric hypertrophy with dilated LA and severe pulmonary hypertension  11. Pulm HTN: Secondary to WHO group 2 and group 3 disease. Elevated LVEDP on cardiac cath from 12/21/20  12. History of tobacco use: Quit smoking in 2006  13. Osteoporosis: Seen on most recent DEXA scan from 11/2/2020. Secondary to chronic steroid use. Pt on Prolia injections by PCP  14. Glaucoma and cataracts: Follows with ophthalmology, likely exacerbated by chronic steroid use      Plan:  -Restart chronic steroids --- 20mg x 5 days, then 10mg x 5 days, 5mg x 5 days, then to 2.5mg chronically. Advised pt that we may consider increasing dose if pt has worsening symptoms. Again counseled pt regarding long term risks of chronic steroids, but pt feels that benefits outweigh risks at this time given the severity of her chronic symptoms  -Follow-up with cardiology with regards to CHF. Counseled patient with regards to CHF lifestyle precautions including fluid restriction, daily weights, low-sodium diet, etc.  -Last CT lung cancer screening scheduled for 11/3/2021. This will complete 15 years of surveillance post cessation  -Management of osteoporosis per PCP  -Continue chronic azithromycin therapy 250 mg p.o. every Monday/Wednesday/Friday  -Continue dual ICS/LABA/LAMA/LTRA therapy   Continue Advair /21 MCG 2 puffs twice daily. Counseled patient rinse mouth thoroughly after use   Continue Flovent  mcg 1 puff twice daily.   Counseled patient to rinse mouth well after each use   Continue Spiriva Respimat 2 puffs daily   Continue Singulair 10 mg nightly   Continue DuoNebs as needed   Continue Albuterol HFA 1-2puffs q4-6h PRN. Counseled patient that this is their rescue inhaler. Also counseled patient regarding premedication 15-30m prior to exercise or exposure to very cold air  -Counseled patient on proper inhaler technique  -Counseled patient to avoid potential triggers of asthma  -Counseled regarding weight loss  -Follow-up with ophthalmology as scheduled. Continue Prolia injections by primary care  -Continue loratadine daily  -Continue trilogy nightly and PRN during daytime, congratulated patient on good compliance. Counseled patient to change mask/tubing/filters every 3 to 6 months. Counseled patient against sleepy driving.  -6 min walk performed and reviewed in clinic today, continue supplemental oxygen 2 L with exertion and blended into AVAPS nightly  -Weight loss  -Advised patient to followup with pulmonary rehab to begin graded exercise  -Immunizations reviewed, influenza and pneumococcal vaccinations up-to-date  -Counseled patient regarding lifestyle precautions in COVID-19 pandemic including wearing mask in public and confined spaces, social/physical distancing, frequent hand hygiene, etc.  Merari Toure pt to receive COVID-19 vaccination when possible     Follow-up and Dispositions    · Return in about 3 months (around 11/30/2021). Orders Placed This Encounter    RT--OXIMETRY 6 MINUTE WALK    montelukast (Singulair) 10 mg tablet    fluticasone propionate (FLONASE) 50 mcg/actuation nasal spray    predniSONE (DELTASONE) 5 mg tablet     This patient has a high complexity chronic care condition   This Visit needed High complexity medically necessary decision making and management plans.       Katarzyna Sanabria MD/MPH     Pulmonary, Critical Care Medicine  [facility]

## 2021-09-10 ENCOUNTER — APPOINTMENT (OUTPATIENT)
Dept: PULMONOLOGY | Age: 61
End: 2021-09-10

## 2021-10-01 RX ORDER — FUROSEMIDE 40 MG/1
TABLET ORAL
Qty: 60 TABLET | Refills: 0 | Status: SHIPPED | OUTPATIENT
Start: 2021-10-01

## 2021-10-05 ENCOUNTER — HOSPITAL ENCOUNTER (OUTPATIENT)
Dept: MAMMOGRAPHY | Age: 61
Discharge: HOME | End: 2021-10-05
Attending: FAMILY MEDICINE
Payer: MEDICARE

## 2021-10-05 DIAGNOSIS — Z12.31 VISIT FOR SCREENING MAMMOGRAM: ICD-10-CM

## 2021-10-05 PROCEDURE — 77067 SCR MAMMO BI INCL CAD: CPT

## 2021-10-12 ENCOUNTER — HOSPITAL ENCOUNTER (OUTPATIENT)
Dept: INFUSION THERAPY | Age: 61
Discharge: HOME | End: 2021-10-12
Payer: MEDICARE

## 2021-10-12 VITALS
TEMPERATURE: 97.5 F | OXYGEN SATURATION: 95 % | HEART RATE: 85 BPM | DIASTOLIC BLOOD PRESSURE: 79 MMHG | RESPIRATION RATE: 24 BRPM | SYSTOLIC BLOOD PRESSURE: 151 MMHG

## 2021-10-12 DIAGNOSIS — J45.51 SEVERE PERSISTENT ASTHMA WITH EXACERBATION: Primary | ICD-10-CM

## 2021-10-12 PROCEDURE — 96372 THER/PROPH/DIAG INJ SC/IM: CPT

## 2021-10-12 PROCEDURE — 74011250636 HC RX REV CODE- 250/636: Performed by: INTERNAL MEDICINE

## 2021-10-12 RX ORDER — METOCLOPRAMIDE 5 MG/1
5 TABLET ORAL
COMMUNITY
Start: 2022-09-23 | End: 2022-09-23

## 2021-10-12 RX ADMIN — BENRALIZUMAB 30 MG: 30 INJECTION, SOLUTION SUBCUTANEOUS at 10:15

## 2021-10-12 NOTE — PROGRESS NOTES
[facility] Progress Note    Date: 2021    Name: Janene Maxwell    MRN: [id number]         : 1960    Ms. Aimee Cho arrived in the [facility] today at 1005 for her FASENRA Injection (Every 8 Weeks). She was assessed and education including discharge instructions was discussed with Ms Aimee Cho. She verbalized understanding. Ms. Warner Luu vitals were reviewed. Visit Vitals  BP (!) 151/79 (BP 1 Location: Left arm, BP Patient Position: Sitting)   Pulse 85   Temp 97.5 °F (36.4 °C)   Resp 24   SpO2 95%   Breastfeeding No       Benralizumab (FASENRA) 30 mg, was administered SQ, in the back of her left arm. Ms. Aimee Cho tolerated well and voiced no complaints. Ms. Aimee Cho was discharged from [place] [address] in stable condition at 1020. She is to return on 2021 at 1000 for her next [facility] appt.     Daron Juarez RN  2021  10:20 AM

## 2021-10-30 NOTE — MED STUDENT NOTES
This is a medical student note for learning purposes only. Please refer to the attending physician note for final recommendations. Contact our [address] [place] [address] [place] team with any questions or concerns. [facility]  Progress Note    Patient: Luis Mclaughlin MRN: [id number]   SSN: [id number]  YOB: 1960   Age: 63 y.o. Sex: female      Admit Date: 6/28/2021    LOS: 1 day   Chief Complaint   Patient presents with    Shortness of Breath       Subjective:     Ms. Rossy Pinzon reports no acute changes overnight. She continues to have shortness of breath, headache and and a non-productive cough. She denies any fecal or urinary changes, nausea or vomiting. She expressed some concern about her blood sugar being high and receiving less insulin than she normally takes. Objective:     Visit Vitals  /74   Pulse 82   Temp 97.2 °F (36.2 °C)   Resp 20   Ht 5' 3\" (1.6 m)   Wt 118.4 kg (261 lb)   SpO2 94%   BMI 46.23 kg/m²       Physical Exam:   Physical Examination: Chest - clear to auscultation, no wheezes, rales or rhonchi, symmetric air entry, wheezing noted in right lower lung field  Extremities - peripheral pulses normal, no pedal edema, no clubbing or cyanosis, pedal edema 1 +   Heart - normal rate, regular rhythm, normal S1, S2, no murmurs, rubs, clicks or gallops    Intake and Output:  Current Shift: No intake/output data recorded.   Last three shifts: 06/27 1901 - 06/29 0700  In: 200 [I.V.:200]  Out: -     Lab/Data Review:  Recent Results (from the past 12 hour(s))   GLUCOSE, POC    Collection Time: 06/28/21  9:33 PM   Result Value Ref Range    Glucose (POC) 301 (H) 70 - 110 mg/dL   GLUCOSE, POC    Collection Time: 06/28/21  9:33 PM   Result Value Ref Range    Glucose (POC) 285 (H) 70 - 110 mg/dL   GLUCOSE, POC    Collection Time: 06/28/21 11:22 PM   Result Value Ref Range    Glucose (POC) 298 (H) 70 - 296 mg/dL   METABOLIC PANEL, COMPREHENSIVE    Collection Time: 06/29/21  1:02 AM   Result Value Ref Range    Sodium 135 (L) 136 - 145 mmol/L    Potassium 4.3 3.5 - 5.5 mmol/L    Chloride 100 100 - 111 mmol/L    CO2 25 21 - 32 mmol/L    Anion gap 10 3.0 - 18 mmol/L    Glucose 282 (H) 74 - 99 mg/dL    BUN 16 7.0 - 18 MG/DL    Creatinine 1.06 0.6 - 1.3 MG/DL    BUN/Creatinine ratio 15 12 - 20      GFR est AA >60 >60 ml/min/1.73m2    GFR est non-AA 53 (L) >60 ml/min/1.73m2    Calcium 9.6 8.5 - 10.1 MG/DL    Bilirubin, total 0.7 0.2 - 1.0 MG/DL    ALT (SGPT) 44 13 - 56 U/L    AST (SGOT) 27 10 - 38 U/L    Alk. phosphatase 109 45 - 117 U/L    Protein, total 8.2 6.4 - 8.2 g/dL    Albumin 3.6 3.4 - 5.0 g/dL    Globulin 4.6 (H) 2.0 - 4.0 g/dL    A-G Ratio 0.8 0.8 - 1.7     CBC W/O DIFF    Collection Time: 06/29/21  1:02 AM   Result Value Ref Range    WBC 9.1 4.6 - 13.2 K/uL    RBC 4.49 4.20 - 5.30 M/uL    HGB 12.6 12.0 - 16.0 g/dL    HCT 38.4 35.0 - 45.0 %    MCV 85.5 74.0 - 97.0 FL    MCH 28.1 24.0 - 34.0 PG    MCHC 32.8 31.0 - 37.0 g/dL    RDW 13.9 11.6 - 14.5 %    PLATELET 513 747 - 186 K/uL    MPV 9.2 9.2 - 11.8 FL   MAGNESIUM    Collection Time: 06/29/21  1:02 AM   Result Value Ref Range    Magnesium 2.2 1.6 - 2.6 mg/dL   PHOSPHORUS    Collection Time: 06/29/21  1:02 AM   Result Value Ref Range    Phosphorus 2.3 (L) 2.5 - 4.9 MG/DL   GLUCOSE, POC    Collection Time: 06/29/21  7:05 AM   Result Value Ref Range    Glucose (POC) 215 (H) 70 - 110 mg/dL       RECENT RESULTS  MODALITY IMPRESSION   XR Results from [place] encounter on 06/28/21    XR CHEST SNGL V    Narrative  EXAM: XR CHEST SNGL V    INDICATION: 63 years Female. shortness of breath. ADDITIONAL HISTORY: None. TECHNIQUE: Frontal view of the chest.    COMPARISON: Chest radiograph 3/30/2021. FINDINGS:    Underpenetration limits assessment of the lung bases, left greater than right. The cardiac silhouette is within normal limits in size. Atherosclerotic  calcifications are noted in the aorta.  Pulmonary vasculature appears within  normal limits. Streaky/patchy opacities at the bilateral lung bases, which are similar in  appearance to prior chest radiograph from 3/30/2021 and likely reflect  atelectasis. No definite evidence of pleural effusion or pneumothorax. No acute osseous abnormality appreciated. Impression  1. No radiographic evidence of acute cardiopulmonary process. 2.  Unchanged patchy bibasilar opacities, favor atelectasis. CT Results from [place] encounter on 11/02/20    CT LOW DOSE LUNG CANCER SCREENING    Narrative  CT CHEST LUNG SCREENING    INDICATION: Lung CT screening study requested as patient needs established age  criteria, smoking history requirements, or additional risk factor eligibility  requirements. CT screening study requested as patient meets established  criteria. 48 pack year smoking history. COPD. Technique: Low-dose computed tomography acquisition performed according to the  national comprehensive cancer network guidelines space on body mass index. Axial  raw images obtained. Reconstruction on lung windows and soft tissue windows with  additional coronal and sagittally reformatted series. No intravenous contrast  administered for this exam.    COMPARISON: CT abdomen/pelvis 8/31/2020, CT chest 2/21/2018    FINDINGS:  Lungs: Right middle lobe atelectasis is similar to 8/31/2020, progressed from  2018. Subsegmental atelectasis in the lingula. Mild bronchial wall thickening. 3  mm right upper lobe nodule (4, 76), not definitely seen on 2018 study. Pleura: No effusion. Lymph Nodes: No lymphadenopathy. Cardiovascular: Moderate calcified atherosclerotic disease, including the  coronary arteries. Bones: Mild degenerative changes in the spine. No suspicious osseous lesions. No  acute osseous abnormality. Visualized upper abdomen is unremarkable. Impression  IMPRESSION:    1.  Right upper lobe pulmonary nodule measuring 3 mm, not definitely seen on 2018  study. Lung-RADS 2 - Benign appearance or behavior. Management: Continue annual screening with low dose CT in 12 months. 2. Right middle lobe atelectasis is similar to 8/31/2020, progressed from 2018. 3. Mild bronchial wall thickening, likely reflecting history of COPD. MRI No results found for this or any previous visit. ULTRASOUND Results from [place] encounter on 09/28/20    US BREAST RT LIMITED=<3 QUAD    Impression  IMPRESSION:  No evidence for malignancy. Patient's palpable areas of concern at  the 12:30-1:00 right breast correlate with benign lipomas. BI-RADS CATEGORY: BI-RADS 2 - Benign    FOLLOW-UP RECOMMENDATIONS: Patient is encouraged to continue regular self  examinations, and should return to clinic should her examination or symptoms  change in any other concerning way. Otherwise, recommend routine follow-up with  annual bilateral screening mammography, for which patient will be due in  September 2021. Thank you for enabling us to participate in the care of this patient. Results from [place] encounter on 04/10/17    US ABD COMP    Impression  IMPRESSION:      1. Echogenic mildly enlarged liver, suggestive of hepatic steatosis. No focal  hepatic lesion identified. 2. Status post cholecystectomy. No biliary ductal dilatation.        Cardiology Procedures/Testing:  MODALITY RESULTS   EKG Results for orders placed or performed during the hospital encounter of 06/28/21   EKG, 12 LEAD, INITIAL   Result Value Ref Range    Ventricular Rate 76 BPM    Atrial Rate 76 BPM    P-R Interval 152 ms    QRS Duration 130 ms    Q-T Interval 426 ms    QTC Calculation (Bezet) 479 ms    Calculated P Axis 68 degrees    Calculated R Axis 38 degrees    Calculated T Axis 19 degrees    Diagnosis       Normal sinus rhythm  Right bundle branch block  Abnormal ECG  When compared with ECG of 29-APR-2021 17:48,  No significant change was found  Confirmed by Milana Grove MD, ----- ([id number]) on 6/28/2021 5:03:51 PM         ECHO 03/22/21    ECHO ADULT COMPLETE 03/22/2021 3/22/2021    Interpretation Summary  · Image quality for this study was suboptimal. Contrast used: DEFINITY. · LV: Estimated LVEF is 60 - 65%. Normal cavity size and systolic function (ejection fraction normal). Mild concentric hypertrophy. Mild (grade 1) left ventricular diastolic dysfunction E/E' ratio is 14.15.  · LA: Mildly dilated left atrium. · RV: Mildly dilated right ventricle. · RA: Mildly dilated right atrium. · PA: Pulmonary arterial systolic pressure is 27 mmHg. Pulmonary hypertension not suggested by Doppler findings. · Pericardium: Pericardial fat pad present. Signed by: Sharmin Moreno MD on 3/22/2021  8:06 PM       Special Testing/Procedures:  MODALITY RESULTS   MICRO All Micro Results     Procedure Component Value Units Date/Time    RESPIRATORY VIRUS PANEL W/COVID-19, PCR [[id number]] Collected: 06/28/21 1725    Order Status: Completed Specimen: Nasopharyngeal Updated: 06/28/21 1911     Adenovirus Not detected        Coronavirus 229E Not detected        Coronavirus HKU1 Not detected        Coronavirus CVNL63 Not detected        Coronavirus OC43 Not detected        SARS-CoV-2, PCR Not detected        Metapneumovirus Not detected        Rhinovirus and Enterovirus Not detected        Influenza A Not detected        Influenza A, subtype H1 Not detected        Influenza A, subtype H3 Not detected        INFLUENZA A H1N1 PCR Not detected        Influenza B Not detected        Parainfluenza 1 Not detected        Parainfluenza 2 Not detected        Parainfluenza 3 Not detected        Parainfluenza virus 4 Not detected        RSV by PCR Not detected        B. parapertussis, PCR Not detected        Bordetella pertussis - PCR Not detected        Chlamydophila pneumoniae DNA, QL, PCR Not detected        Mycoplasma pneumoniae DNA, QL, PCR Not detected            UA No results found for this or any previous visit. PATH No results     Telemetry NONE   Oxygen 2L     Assessment and Plan:     Ms. Puma Moreno is a 64 yo F with a PMH of HFpEF, COPD, IDDM2, HTN, SHERRIE, GERD and allergic rhinitis experiencing a COPD exacerbation for 1 week. COPD:  [facility] course for COPD exacerbation has been stable. - Start Duoneb treatment   - Continue Fasenra 30mg SQH every 8 weeks as theres been an improvement of symptoms. Last infusion was on 06/22.  - Continue IV levaquin 750mg   - Continue Azithromycin   - Continue Solumedrol       HFpEF:   - Continue Lasix 40mg daily  - Schedule FU Echo per recommendation from pulm. Although she had a normal BNP of 62, FU echo would be confirmatory to rule out exacerbation of HF. IDDM2:   - Glucose on 06/29/2021 was 215.  - Change her insulin to SSI (insulin resistant) to address her hyperglycemia likely secondary to chronic steroid use. SHERRIE: Continue BIPAP at night and as needed. HTN: Continue Lisinopril 20 mg daily at noon. GERD: Currently asymptomatic  - Continue protonix 40 mg daily     Headache: Progressive tension HA localized to the top of her head. Schedule tylenol      Diet    DVT Prophylaxis    GI Prophylaxis    Code status    Disposition      Point of Contact   Relationship:  [phone])     Rodríguez Santacruz, MS3   [facility] Medicine   June 29, 2021, 8:36 AM     *ATTENTION:  This note has been created by a medical student for educational purposes only. Please do not refer to the content of this note for clinical decision-making, billing, or other purposes. Please see attending physicians note to obtain clinical information on this patient. * PAST SURGICAL HISTORY:  H/O bilateral hip replacements     History of total bilateral knee replacement     No significant past surgical history

## 2021-11-03 ENCOUNTER — HOSPITAL ENCOUNTER (OUTPATIENT)
Dept: CT IMAGING | Age: 61
Discharge: HOME | End: 2021-11-03
Attending: INTERNAL MEDICINE
Payer: MEDICARE

## 2021-11-03 VITALS — HEIGHT: 64 IN | WEIGHT: 218.26 LBS | BODY MASS INDEX: 37.26 KG/M2

## 2021-11-03 DIAGNOSIS — J44.9 COPD, GROUP D, BY GOLD 2017 CLASSIFICATION (HCC): ICD-10-CM

## 2021-11-03 DIAGNOSIS — Z87.891 PERSONAL HISTORY OF TOBACCO USE, PRESENTING HAZARDS TO HEALTH: ICD-10-CM

## 2021-11-03 PROCEDURE — 71271 CT THORAX LUNG CANCER SCR C-: CPT

## 2021-11-08 ENCOUNTER — TRANSCRIBE ORDER (OUTPATIENT)
Dept: REGISTRATION | Age: 61
End: 2021-11-08

## 2021-11-08 ENCOUNTER — HOSPITAL ENCOUNTER (OUTPATIENT)
Dept: GENERAL RADIOLOGY | Age: 61
Discharge: HOME | End: 2021-11-08
Payer: MEDICARE

## 2021-11-08 DIAGNOSIS — S99.922A INJURY OF LEFT TOE: Primary | ICD-10-CM

## 2021-11-08 DIAGNOSIS — S99.922A INJURY OF LEFT TOE: ICD-10-CM

## 2021-11-08 PROCEDURE — 73630 X-RAY EXAM OF FOOT: CPT

## 2021-11-30 NOTE — PERIOP NOTES
Patient armband removed and shredded    Patient confirmed by two identifiers with discharge instructions prior to being provided to patient.
Patient ride is 20 minutes away. Patient accompanied by granddaughter Scooby Forget. Did not bring home 02, pt refused to wait in room with utilization of 02. Patient and granddaughter waiting at front of hospital for ride.  Pt states 'I'll be fine.'
Ti[address] August 14, 2017       RE: Amber Zelaya      To Whom It May Concern,    This is to certify that Amber Zelaya was admitted to [facility] today, 8/14/17. Please feel free to contact my office if you have any questions or concerns. Thank you for your assistance in this matter.       Sincerely,  Mel Nolan, RN  [phone]
No

## 2021-12-07 ENCOUNTER — HOSPITAL ENCOUNTER (OUTPATIENT)
Dept: INFUSION THERAPY | Age: 61
Discharge: HOME | End: 2021-12-07
Payer: MEDICARE

## 2021-12-07 VITALS
OXYGEN SATURATION: 94 % | HEART RATE: 72 BPM | RESPIRATION RATE: 24 BRPM | SYSTOLIC BLOOD PRESSURE: 172 MMHG | TEMPERATURE: 97.7 F | DIASTOLIC BLOOD PRESSURE: 77 MMHG

## 2021-12-07 DIAGNOSIS — J45.51 SEVERE PERSISTENT ASTHMA WITH EXACERBATION: Primary | ICD-10-CM

## 2021-12-07 PROCEDURE — 74011250636 HC RX REV CODE- 250/636: Performed by: INTERNAL MEDICINE

## 2021-12-07 PROCEDURE — 96372 THER/PROPH/DIAG INJ SC/IM: CPT

## 2021-12-07 RX ADMIN — BENRALIZUMAB 30 MG: 30 INJECTION, SOLUTION SUBCUTANEOUS at 09:59

## 2021-12-07 NOTE — PROGRESS NOTES
[facility] Progress Note    Date: 2021    Name: Tianna Atkins    MRN: [id number]         : 1960    Ms. Adrienne Monte arrived in the [facility] today at 1314 for her FASENRA Injection (Every 8 Weeks). She was assessed and education including discharge instructions was discussed with Ms Adrienne Monte. She verbalized understanding. Ms. Scout Dixon vitals were reviewed. Visit Vitals  BP (!) 172/77 (BP 1 Location: Left upper arm, BP Patient Position: Sitting)   Pulse 72   Temp 97.7 °F (36.5 °C)   Resp 24   SpO2 94%   Breastfeeding No       Benralizumab (FASENRA) 30 mg, was administered SQ, in the back of her left arm. Ms. Adrienne Monte tolerated well and voiced no complaints. Ms. Adrienne Monte was discharged from [place] [address] in stable condition at 1005. She is to return on 2022 at 1000 for her next [facility] appt.       Sofi Gabriel  2021

## 2021-12-22 NOTE — PROGRESS NOTES
Abdomen, soft, nontender, nondistended, no guarding or rigidity, no masses palpable, normal bowel sounds, Liver and Spleen, no hepatomegaly present, no hepatosplenomegaly, liver nontender, spleen not palpable Intern Progress Note  [facility]       Patient: Ata Solis MRN: [id number]  CSN: [id number]    YOB: 1960  Age: 60 y.o. Sex: female    DOA: 7/9/2020 LOS:  LOS: 3 days                    Subjective:     Acute events: None    Dr. Britt Morgan interviewed pt, due to Covid restrictions. No nursing calls overnight.  VSS, oxygen needs remain at 3L NC.     ROS  In subjective     Objective:      Patient Vitals for the past 24 hrs:   Temp Pulse Resp BP SpO2   07/12/20 0800 97.6 °F (36.4 °C) 63 18 150/88 95 %   07/12/20 0357 97.1 °F (36.2 °C) 65 18 136/70 98 %   07/12/20 0007 97.6 °F (36.4 °C) 72 19 146/76 97 %   07/11/20 2322 97.6 °F (36.4 °C) 75 18 131/68 97 %   07/11/20 2307 97.4 °F (36.3 °C) 76 18 144/79 95 %   07/11/20 2000 98.4 °F (36.9 °C) 76 18 148/78 96 %   07/11/20 1600 98.1 °F (36.7 °C) 68 18 133/70 95 %   07/11/20 1200 98 °F (36.7 °C) 74 18 171/83 96 %         Intake/Output Summary (Last 24 hours) at 7/12/2020 0950  Last data filed at 7/12/2020 5707  Gross per 24 hour   Intake 990 ml   Output 800 ml   Net 190 ml       Physical Exam (by Dr. Britt Morgan):  General:     CV:      Resp:   Abd:    Ext:    Skin:      Lab/Data Reviewed:  BMP:   Lab Results   Component Value Date/Time     07/12/2020 02:48 AM    K 3.7 07/12/2020 02:48 AM     07/12/2020 02:48 AM    CO2 28 07/12/2020 02:48 AM    AGAP 7 07/12/2020 02:48 AM     (H) 07/12/2020 02:48 AM    BUN 21 (H) 07/12/2020 02:48 AM    CREA 0.90 07/12/2020 02:48 AM    GFRAA >60 07/12/2020 02:48 AM    GFRNA >60 07/12/2020 02:48 AM     CBC:   Lab Results   Component Value Date/Time    WBC 5.3 07/12/2020 02:48 AM    HGB 11.0 (L) 07/12/2020 02:48 AM    HCT 33.1 (L) 07/12/2020 02:48 AM     07/12/2020 02:48 AM        Scheduled Medications Reviewed:  Current Facility-Administered Medications   Medication Dose Route Frequency    insulin glargine (LANTUS) injection 30 Units  30 Units SubCUTAneous QHS    arformoteroL (BROVANA) neb solution 15 mcg  15 mcg Nebulization BID RT    budesonide (PULMICORT) 500 mcg/2 ml nebulizer suspension  500 mcg Nebulization BID RT    methylPREDNISolone (PF) (SOLU-MEDROL) injection 40 mg  40 mg IntraVENous Q6H    enoxaparin (LOVENOX) injection 120 mg  1 mg/kg SubCUTAneous Q12H    remdesivir 100 mg in 0.9% sodium chloride 250 mL IVPB  100 mg IntraVENous Q24H    azithromycin (ZITHROMAX) 500 mg in  mL  500 mg IntraVENous Q24H    insulin lispro (HUMALOG) injection   SubCUTAneous AC&HS    ascorbic acid (vitamin C) (VITAMIN C) tablet 500 mg  500 mg Oral BID    cholecalciferol (VITAMIN D3) (1000 Units /25 mcg) tablet 2 Tab  2,000 Units Oral DAILY    zinc sulfate (ZINCATE) 220 (50) mg capsule 1 Cap  1 Cap Oral DAILY    furosemide (LASIX) tablet 20 mg  20 mg Oral DAILY    lisinopriL (PRINIVIL, ZESTRIL) tablet 20 mg  20 mg Oral DAILY    hydroCHLOROthiazide (HYDRODIURIL) tablet 12.5 mg  12.5 mg Oral DAILY    pantoprazole (PROTONIX) tablet 40 mg  40 mg Oral BID    loratadine (CLARITIN) tablet 10 mg  10 mg Oral DAILY    fluticasone propionate (FLONASE) 50 mcg/actuation nasal spray 2 Spray  2 Spray Both Nostrils DAILY    montelukast (SINGULAIR) tablet 10 mg  10 mg Oral QHS         Imaging, microbiology, and EKG/Telemetry:  Xr Chest Port    Result Date: 7/12/2020  IMPRESSION: No change in bilateral basilar infiltrate/pneumonia. No CHF. Xr Chest Port    Result Date: 7/12/2020  IMPRESSION: No acute change with bilateral basilar infiltrate/pneumonia. No CHF. Assessment/Plan     Acute on chronic hypoxic respiratory failure likely 2/2 Covid PNA: Pt w/ positive covid swab (7/7). Pt with severe underlying lung disease, Severe COPD on max therapy w/ strong asthma component. Hospitalized 5x in 2020 for COPD exacerbations. Followed by Dr. Prem Mcginnis in OP.   -NC and nightly BIPAP.  Target sats 88-92  -Continue Brovana, Pulmicort, Solu-medrol, remdesivir   -Continue Azithromycin  -FU pulmonary recs -Treatment dose Lovenox   -Daily Covid labs: cbc, bmp, ferritin, LD, D-dimer, CRP, coags     Insulin dependent Type 2 Diabete: Home lantus 40u pm and novolog SSI. BS raised this >853>750.   -Lantus 30 U and increase as PO intake increases  -SSI   -Accuchecks ACHS  -Diabetic diet    Hypertension, HFpEF: ECHO (5/24/20) AV 89 - 70%. No regional wall motion abnormality. Moderate (grade 2) left ventricular diastolic dysfunction. Mildly dilated left atrium. Pulmonary hypertension. Pulmonary arterial systolic pressure is 61 mmHg. Severely elevated central venous pressure (15+ mmHg); IVC diameter is larger than 21 mm and collapses less than 50% with respiration. SBP in ED elevated  to 140s-170s. Pt on lisinopril 20mg BID and HCTZ 12.5mg daily at home. Pt also on Lasix 20mg daily PRN for lower extremity edema at home. Patient endorsing increased swelling in  Abdomen and legs.    - Continue Lasix 20mg daily  - Continue Lisinopril 20mg BID  - Continue HCTZ 12.5 mg daily    GERD: Pt takes omeprazole 40mg daily and pepcid for breakthrough symptoms at home.   -Continue protonix 40mg daily    Morbid obesity  -Diabetic diet      Diet Diabetic    DVT Prophylaxis lovenox   GI Prophylaxis Protonix   Code status Full   Disposition [place]>2MN      Point of Contact Po Hurd  Relationship: Daughter  ([phone] [address]   07/12/20 9:17 AM

## 2022-01-01 NOTE — DIABETES MGMT
Glycemic Control Plan of Care    T2DM with current A1c of  8.2% (3/05/2020). See separate notes, 03/31/2020, for assessment of home diabetes management/education. Home diabetes medications:   Basaglar (lantus) pen insulin 35 units daily at bedtime. Novolog pen sliding scale insulin TID AC. Patient cont on IV solumedrol 60 mg every 12 hours and stated, \"my blood sugar is going to stay high because I am on steroids. \" She report eating meals with with good appetite. POC BG range on 03/31/2020: 285-320. Increased basal lantus insulin dose from 25 to 30 units daily. Cont correctional lispro insulin at very resistant dose. Results for Paula Metz (MRN [id number]) as of 4/1/2020 11:36   Ref. Range 4/1/2020 05:21   Glucose Latest Ref Range: 74 - 99 mg/dL 304 (H)     POC BG report on 04/01/2020 at time of review: 300. Discussed with Dr. Chacha Colunga BG values remain above target range and patient remain on steroids and ins rec. Recommendation(s):  1.) obtained order to increase lantus dose to 40 units daily at bedtime today. Patient received 35 units last night. Entered a one time dose of 5 units lantus for today and added meal time lispro insulin 4 units TID AC in addition to correctional lispro insulin. Assessment:  Patient is 60year old with past medical history including type 2 diabetes mellitus, asthma, COPD, GERD, hyperlipidemia, SHERRIE of Cpap, and hyperlipidemia - was admitted on 3/30/2020 with report of worsening shortness of breath. Noted:  COPD exacerbation. T2DM    Most recent blood glucose values:        Current A1C: 8.2% (3/05/2020) which is equivalent to estimated average blood glucose of 189 mg/dL during the past 2-3 months. Current hospital diabetes medications:  Basal lantus insulin 40 units daily at bedtime. Correctional lispro insulin ACHS. Very resistant dose.     Total daily dose insulin requirement previous day: 03/31/2020:  Lantus: 35 units  Lispro: 42 units  TTD Subjective


Progress Note Date: 22


Principal diagnosis: 


 (concern of a right aortic arch and vascular ring) with BTL. A

ntepartum complications include but are not limited to: ectopic pregnancy, 

intrauterine fetal demise @ 29 weeks (parental concern this was proximate to a 

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 








1) reviewed case with Dr Garcia [phone] ([facility]'s) 3/18 - also 

observed in nursery and hold feeds for first 2 hours of life -





2) CXR, ekg and echo 3/18 - NORMAL here





3) discussed anticipatory guidance at length





4) encouraged breast feeding











Objective





- Vital Signs


Vital signs: 


                                   Vital Signs











Temp  98.6 F   22 16:40


 


Pulse  140   22 16:00


 


Resp  66   22 16:00


 


BP  63/30   22 08:30


 


Pulse Ox  98   22 10:30








                                 Intake & Output











 22





 06:59 18:59 06:59


 


Weight  2.73 kg 


 


Other:   


 


  Intake, Breast Feeding   





  Duration (minutes)   


 


    Feeding Type 1  10 


 


  # Voids  1 


 


  # Bowel Movements  1 














- Exam








[place] flat, acyanotic, calvarium intact and symmetrical.





Red reflex present 2.





Tragus normally formed and placed





Nares patent.





Oropharynx with palate diffuse midline.





Neck without clavicle fractures or branchial cleft remnant evident.





Chest clear to auscultation.





Cardiac S1-S2 normally split without any obvious murmurs or gallops.





Abdomen bowel sounds present without masses





 rectal: Normal female anatomy patent noninflamed rectum





Back and extremities without develop mental hip dysplasia, full range of motion.





Skin without clubbing cyanosis or edema.





Neuro no pathologic reflexes were identified











Assessment and Plan


(1) Term  delivered by , current hospitalization


Current Visit: Yes   Status: Acute   Code(s): Z38.01 - SINGLE LIVEBORN INFANT, 

DELIVERED BY    SNOMED Code(s): 139431513


   





(2) Family history of fetal loss


Current Visit: Yes   Status: Acute   Code(s): Z84.89 - FAMILY HISTORY OF OTHER 

SPECIFIED CONDITIONS   SNOMED Code(s): 414614621


   





(3) Family hx-GI disorders


Current Visit: Yes   Status: Acute   Code(s): Z83.79 - FAMILY HISTORY OF OTHER 

DISEASES OF THE DIGESTIVE SYSTEM   SNOMED Code(s): 375321288


   





(4) Family hx-asthma


Current Visit: Yes   Status: Acute   Code(s): Z82.5 - FAMILY HISTORY OF ASTHMA 

AND OTH CHRONIC LOWER RESP DISEASES   SNOMED Code(s): 179660687


   





(5) Advanced maternal age during pregnancy in third trimester


Current Visit: Yes   Status: Acute   Code(s): ZHY2674 -    SNOMED Code(s): 

704338971


   





(6) Breastfeeding (infant)


Current Visit: Yes   Status: Acute   Code(s): Z78.9 - OTHER SPECIFIED HEALTH 

STATUS   SNOMED Code(s): 177924045


   





(7) Vascular ring anomaly


Narrative/Plan: 


concern  ultrasound


Current Visit: Yes   Status: Acute   Code(s): Q25.45 - DOUBLE AORTIC ARCH   

SNOMED Code(s): 27300869


   





(8) Right aortic arch


Narrative/Plan: 


 ultrasound concern


Current Visit: Yes   Status: Acute   Code(s): Q25.47 - RIGHT AORTIC ARCH   

SNOMED Code(s): 930895231


   


Plan: 


1) reviewed case with Dr Garcia [phone] ([facility]'s) 3/18 - also 

observed in nursery and hold feeds for first 2 hours of life -





2) CXR, ekg and echo 3/18 - NORMAL here





3) discussed anticipatory guidance at length





4) encouraged breast feeding





Time with Patient: Greater than 30 insulin: 77 units    Diet: Diabetic consistent carb regular. Goals:  Blood glucose will be within target range of  mg/dL by 04/04/2020.     Education:  _X__  Refer to Diabetes Education Record: 03/31/2020             ___  Education not indicated at this time    Corry Vela RN [facility]  Pager: [phone]

## 2022-02-01 ENCOUNTER — HOSPITAL ENCOUNTER (OUTPATIENT)
Dept: INFUSION THERAPY | Age: 62
End: 2022-02-01
Payer: MEDICARE

## 2022-02-03 ENCOUNTER — HOSPITAL ENCOUNTER (OUTPATIENT)
Dept: INFUSION THERAPY | Age: 62
Discharge: HOME | End: 2022-02-03
Payer: MEDICARE

## 2022-02-03 VITALS
RESPIRATION RATE: 24 BRPM | SYSTOLIC BLOOD PRESSURE: 171 MMHG | OXYGEN SATURATION: 96 % | HEART RATE: 80 BPM | TEMPERATURE: 98 F | DIASTOLIC BLOOD PRESSURE: 77 MMHG

## 2022-02-03 DIAGNOSIS — J45.51 SEVERE PERSISTENT ASTHMA WITH EXACERBATION: Primary | ICD-10-CM

## 2022-02-03 PROCEDURE — 74011250636 HC RX REV CODE- 250/636: Performed by: INTERNAL MEDICINE

## 2022-02-03 PROCEDURE — 96372 THER/PROPH/DIAG INJ SC/IM: CPT

## 2022-02-03 RX ADMIN — BENRALIZUMAB 30 MG: 30 INJECTION, SOLUTION SUBCUTANEOUS at 14:53

## 2022-02-03 NOTE — PROGRESS NOTES
[facility] Progress Note    Date: February 3, 2022    Name: Shala Valdez    MRN: [id number]         : 1960    Ms. Melecio German arrived in the [facility] today at [phone] for her FASENRA Injection (Every 8 Weeks). She was assessed and education including discharge instructions was discussed with Ms Melecio German. She verbalized understanding. Ms. Kevin Pool vitals were reviewed. Visit Vitals  BP (!) 171/77 (BP 1 Location: Right lower arm, BP Patient Position: Sitting)   Pulse 80   Temp 98 °F (36.7 °C)   Resp 24   SpO2 96%   Breastfeeding No       Benralizumab (FASENRA) 30 mg, was administered SQ, in the back of her right arm. Ms. Melecio German tolerated well and voiced no complaints. Ms. Melecio German was discharged from [place] [address] in stable condition at [phone]. She is to return on 2022 at 1130 for her next [facility] appt.       Starr Harrison  February 3, 2022

## 2022-02-07 ENCOUNTER — APPOINTMENT (OUTPATIENT)
Dept: GENERAL RADIOLOGY | Age: 62
End: 2022-02-07
Attending: STUDENT IN AN ORGANIZED HEALTH CARE EDUCATION/TRAINING PROGRAM
Payer: MEDICARE

## 2022-02-07 ENCOUNTER — HOSPITAL ENCOUNTER (EMERGENCY)
Age: 62
Discharge: HOME | End: 2022-02-07
Attending: STUDENT IN AN ORGANIZED HEALTH CARE EDUCATION/TRAINING PROGRAM
Payer: MEDICARE

## 2022-02-07 VITALS
OXYGEN SATURATION: 95 % | DIASTOLIC BLOOD PRESSURE: 46 MMHG | TEMPERATURE: 98.4 F | RESPIRATION RATE: 27 BRPM | BODY MASS INDEX: 37.22 KG/M2 | SYSTOLIC BLOOD PRESSURE: 111 MMHG | HEIGHT: 64 IN | HEART RATE: 97 BPM | WEIGHT: 218 LBS

## 2022-02-07 DIAGNOSIS — J44.1 ACUTE EXACERBATION OF CHRONIC OBSTRUCTIVE PULMONARY DISEASE (COPD) (HCC): Primary | ICD-10-CM

## 2022-02-07 DIAGNOSIS — R77.8 ELEVATED TROPONIN: ICD-10-CM

## 2022-02-07 LAB
ANION GAP SERPL CALC-SCNC: 6 MMOL/L (ref 3–18)
BASOPHILS # BLD: 0 K/UL (ref 0–0.1)
BASOPHILS NFR BLD: 0 % (ref 0–2)
BNP SERPL-MCNC: 123 PG/ML (ref 0–900)
BUN SERPL-MCNC: 12 MG/DL (ref 7–18)
BUN/CREAT SERPL: 13 (ref 12–20)
CALCIUM SERPL-MCNC: 10.3 MG/DL (ref 8.5–10.1)
CHLORIDE SERPL-SCNC: 100 MMOL/L (ref 100–111)
CO2 SERPL-SCNC: 32 MMOL/L (ref 21–32)
CREAT SERPL-MCNC: 0.9 MG/DL (ref 0.6–1.3)
DIFFERENTIAL METHOD BLD: ABNORMAL
EOSINOPHIL # BLD: 0 K/UL (ref 0–0.4)
EOSINOPHIL NFR BLD: 0 % (ref 0–5)
ERYTHROCYTE [DISTWIDTH] IN BLOOD BY AUTOMATED COUNT: 13.7 % (ref 11.6–14.5)
GLUCOSE SERPL-MCNC: 208 MG/DL (ref 74–99)
HCT VFR BLD AUTO: 38.8 % (ref 35–45)
HGB BLD-MCNC: 13 G/DL (ref 12–16)
IMM GRANULOCYTES # BLD AUTO: 0.1 K/UL (ref 0–0.04)
IMM GRANULOCYTES NFR BLD AUTO: 2 % (ref 0–0.5)
LYMPHOCYTES # BLD: 2.1 K/UL (ref 0.9–3.6)
LYMPHOCYTES NFR BLD: 28 % (ref 21–52)
MCH RBC QN AUTO: 28.7 PG (ref 24–34)
MCHC RBC AUTO-ENTMCNC: 33.5 G/DL (ref 31–37)
MCV RBC AUTO: 85.7 FL (ref 78–100)
MONOCYTES # BLD: 0.4 K/UL (ref 0.05–1.2)
MONOCYTES NFR BLD: 5 % (ref 3–10)
NEUTS SEG # BLD: 4.9 K/UL (ref 1.8–8)
NEUTS SEG NFR BLD: 65 % (ref 40–73)
NRBC # BLD: 0 K/UL (ref 0–0.01)
NRBC BLD-RTO: 0 PER 100 WBC
PLATELET # BLD AUTO: 303 K/UL (ref 135–420)
PMV BLD AUTO: 8.5 FL (ref 9.2–11.8)
POTASSIUM SERPL-SCNC: 3.9 MMOL/L (ref 3.5–5.5)
RBC # BLD AUTO: 4.53 M/UL (ref 4.2–5.3)
SODIUM SERPL-SCNC: 138 MMOL/L (ref 136–145)
TROPONIN-HIGH SENSITIVITY: 74 NG/L (ref 0–54)
TROPONIN-HIGH SENSITIVITY: 92 NG/L (ref 0–54)
WBC # BLD AUTO: 7.6 K/UL (ref 4.6–13.2)

## 2022-02-07 PROCEDURE — 93005 ELECTROCARDIOGRAM TRACING: CPT

## 2022-02-07 PROCEDURE — 99285 EMERGENCY DEPT VISIT HI MDM: CPT

## 2022-02-07 PROCEDURE — 94640 AIRWAY INHALATION TREATMENT: CPT

## 2022-02-07 PROCEDURE — 74011250637 HC RX REV CODE- 250/637: Performed by: STUDENT IN AN ORGANIZED HEALTH CARE EDUCATION/TRAINING PROGRAM

## 2022-02-07 PROCEDURE — 96365 THER/PROPH/DIAG IV INF INIT: CPT

## 2022-02-07 PROCEDURE — 84484 ASSAY OF TROPONIN QUANT: CPT

## 2022-02-07 PROCEDURE — 83880 ASSAY OF NATRIURETIC PEPTIDE: CPT

## 2022-02-07 PROCEDURE — 83735 ASSAY OF MAGNESIUM: CPT

## 2022-02-07 PROCEDURE — 71045 X-RAY EXAM CHEST 1 VIEW: CPT

## 2022-02-07 PROCEDURE — 74011250636 HC RX REV CODE- 250/636: Performed by: STUDENT IN AN ORGANIZED HEALTH CARE EDUCATION/TRAINING PROGRAM

## 2022-02-07 PROCEDURE — 80048 BASIC METABOLIC PNL TOTAL CA: CPT

## 2022-02-07 PROCEDURE — 96375 TX/PRO/DX INJ NEW DRUG ADDON: CPT

## 2022-02-07 PROCEDURE — 85025 COMPLETE CBC W/AUTO DIFF WBC: CPT

## 2022-02-07 PROCEDURE — 74011000250 HC RX REV CODE- 250: Performed by: STUDENT IN AN ORGANIZED HEALTH CARE EDUCATION/TRAINING PROGRAM

## 2022-02-07 RX ORDER — PREDNISONE 50 MG/1
50 TABLET ORAL DAILY
Qty: 5 TABLET | Refills: 0 | Status: SHIPPED | OUTPATIENT
Start: 2022-02-07 | End: 2022-02-12

## 2022-02-07 RX ORDER — IPRATROPIUM BROMIDE AND ALBUTEROL SULFATE 2.5; .5 MG/3ML; MG/3ML
9 SOLUTION RESPIRATORY (INHALATION)
Status: COMPLETED | OUTPATIENT
Start: 2022-02-07 | End: 2022-02-07

## 2022-02-07 RX ORDER — ACETAMINOPHEN 500 MG
1000 TABLET ORAL ONCE
Status: COMPLETED | OUTPATIENT
Start: 2022-02-07 | End: 2022-02-07

## 2022-02-07 RX ADMIN — IPRATROPIUM BROMIDE AND ALBUTEROL SULFATE 9 ML: .5; 3 SOLUTION RESPIRATORY (INHALATION) at 15:44

## 2022-02-07 RX ADMIN — AZITHROMYCIN MONOHYDRATE 500 MG: 500 INJECTION, POWDER, LYOPHILIZED, FOR SOLUTION INTRAVENOUS at 15:44

## 2022-02-07 RX ADMIN — METHYLPREDNISOLONE SODIUM SUCCINATE 125 MG: 125 INJECTION, POWDER, FOR SOLUTION INTRAMUSCULAR; INTRAVENOUS at 15:44

## 2022-02-07 RX ADMIN — ACETAMINOPHEN 1000 MG: 500 TABLET ORAL at 18:19

## 2022-02-07 NOTE — ED TRIAGE NOTES
Pt arrived EMS from [facility] for SOB.   EMS administered one duo-neb en route with pt SPO2 99% 2L  Pt on 2L nasal cannula home oxygen    Hx   Hypertension [I10]     COPD     Cystocele, midline [N81.11]     Diabetes mellitus ([address] [E11.9]     Hyperlipidemia [E78.5]     Stress incontinence [N39.3]     Hidradenitis suppurativa [L73.2]     GERD (gastroesophageal reflux disease) [K21.9]     Asthma [J45.909]     SHERRIE on CPAP [G47.33, Z99.89]  CPAP   Chronic lung disease [J98.4]

## 2022-02-07 NOTE — ED PROVIDER NOTES
[facility] EMERGENCY DEPARTMENT HISTORY AND PHYSICAL EXAM      Date: 2/7/2022  Patient Name: Geronimo Wade    History of Presenting Illness     No chief complaint on file. History (Context): Geronimo Wade is a 57 y.o. female with a past medical history significant for COPD on 2 L/min nasal cannula, asthma, hyperlipidemia, hypertension, diabetes comes into the ED today due to dyspnea. Patient states that she was recently seen and discharged from the hospital  on last Monday. She states that she has been compliant with her medication and steroids following discharge however became acutely dyspneic today. She states symptoms are consistent with previous COPD exacerbations. Patient admits to a cough however unsure of sputum color, but denies any fever, chills, chest pain, abdominal pain, nausea, vomiting, or diarrhea. She states her symptoms are severe in quality. She denies previous intubation for her COPD. Patient admits to exacerbation of her symptoms with exertion      PCP: Jayshree White MD    Current Facility-Administered Medications   Medication Dose Route Frequency Provider Last Rate Last Admin    methylPREDNISolone (PF) (Solu-MEDROL) injection 125 mg  125 mg IntraVENous NOW Cynthia Carvalho DO        albuterol-ipratropium (DUO-NEB) 2.5 MG-0.5 MG/3 ML  9 mL Nebulization NOW Joce Morris DO        azithromycin (ZITHROMAX) 500 mg in 0.9% sodium chloride 250 mL (VIAL-MATE)  500 mg IntraVENous ONCE Cynthia Carvalho, DO         Current Outpatient Medications   Medication Sig Dispense Refill    metoclopramide HCl (Reglan) 5 mg tablet Take 5 mg by mouth Before breakfast, lunch, and dinner.  furosemide (LASIX) 40 mg tablet Take 1 tablet by mouth twice daily 60 Tablet 0    montelukast (Singulair) 10 mg tablet Take 1 Tablet by mouth nightly. 90 Tablet 3    fluticasone propionate (FLONASE) 50 mcg/actuation nasal spray 2 Sprays by Both Nostrils route daily.  3 Each 3    azithromycin (ZITHROMAX) 250 mg tablet Take 1 Tab by mouth every Monday, Wednesday, Friday. 30 Tab 3    tiotropium bromide (Spiriva Respimat) 2.5 mcg/actuation inhaler Take 2 Puffs by inhalation daily. 3 Inhaler 3    fluticasone propion-salmeteroL (ADVAIR HFA) 116-44 mcg/actuation inhaler Take 2 Puffs by inhalation two (2) times a day. 3 Each 3    Prolia 60 mg/mL injection INJECT 60MG SUBCUTANEOUSLY AS A SINGLE DOSE ONCE EVERY 6 MONTHS      simethicone (GAS-X) 125 mg capsule Take 125 mg by mouth four (4) times daily as needed for Flatulence.  insulin glargine (LANTUS,BASAGLAR) 100 unit/mL (3 mL) inpn 50 Units by SubCUTAneous route nightly. Indications: type 2 diabetes mellitus      albuterol-ipratropium (DUO-NEB) 2.5 mg-0.5 mg/3 ml nebu 3 mL by Nebulization route every six (6) hours as needed for Wheezing.  glucose blood VI test strips (FreeStyle Lite Strips) strip Use four times daily for blood sugar checks.  Blood-Glucose Meter (FreeStyle Freedom Lite) monitoring kit CHECK BLOOD SUGARS B.I.D. E11.9      lancets (FreeStyle Lancets) 28 gauge misc Use four times daily for blood sugar checks      Insulin Needles, Disposable, (BD Ultra-Fine Mini Pen Needle) 31 gauge x 3/16\" ndle Use with Basaglar once daily.  Insulin Syringe-Needle U-100 0.3 mL 31 gauge x 5/16\" syrg USE AS DIRECTED      acetaminophen (TYLENOL) 500 mg tablet Take 500 mg by mouth every six (6) hours as needed for Fever or Pain.  albuterol (PROVENTIL HFA, VENTOLIN HFA, PROAIR HFA) 90 mcg/actuation inhaler Take 2 Puffs by inhalation every four (4) hours as needed for Wheezing. 1 Inhaler 0    omeprazole (PRILOSEC) 40 mg capsule Take 40 mg by mouth daily.  lisinopril (PRINIVIL, ZESTRIL) 20 mg tablet Take 20 mg by mouth daily.  NOVOLOG FLEXPEN U-100 INSULIN 100 unit/mL inpn Continue home Sliding scale insulin as prior to admission 1 Pen 0    OXYGEN-AIR DELIVERY SYSTEMS 2 L by Nasal route continuous.  First Choice between 2L and 3L      aspirin delayed-release 81 mg tablet Take 81 mg by mouth daily. Past History     Past Medical History:   Past Medical History:   Diagnosis Date    Asthma     Chronic lung disease     COPD     Cystocele, midline     Diabetes mellitus ([address]     GERD (gastroesophageal reflux disease)     Hidradenitis suppurativa     Hyperlipidemia     Hypertension     SHERRIE on CPAP     CPAP    Stress incontinence        Past Surgical History:  Past Surgical History:   Procedure Laterality Date    HX APPENDECTOMY      HX CATARACT REMOVAL Right     HX CHOLECYSTECTOMY      HX DILATION AND CURETTAGE      Dysfunctional uterine bleeding, thought 2/2 fibroids    HX HEART CATHETERIZATION  12/2020    HX TUBAL LIGATION      PA BREAST SURGERY PROCEDURE UNLISTED      Right breast benign tumor removal       Family History:  Family History   Problem Relation Age of Onset    Hypertension Mother     Stroke Mother     Breast Cancer Mother         Bilateral mastectomies    Cancer Mother         ovarian and breast    Heart Failure Mother     Ovarian Cancer Mother     Heart Attack Father         2011    Heart Surgery Father         CABG    Heart Failure Father     COPD Sister         Heavy smoker    Cancer Sister         ovarian    Heart Failure Sister     Lung Disease Sister     Asthma Child     Cancer Maternal Aunt         pancreatic    Cancer Maternal Grandfather         stomach       Social History:   Social History     Tobacco Use    Smoking status: Former Smoker     Packs/day: 1.00     Years: 30.00     Pack years: 30.00     Types: Cigarettes     Start date: 5/6/1966     Quit date: 9/6/2006     Years since quitting: 15.4    Smokeless tobacco: Former User    Tobacco comment: 1-1.5 packs per day   Vaping Use    Vaping Use: Never used   Substance Use Topics    Alcohol use: No    Drug use: No       Allergies:   Allergies   Allergen Reactions    Ancef [Cefazolin] Hives    Contrast Agent [Iodine] Anaphylaxis, Shortness of Breath and Swelling     Needs pre-medication for IV contrast with Benadryl, Solu-Medrol    Fish Containing Products Anaphylaxis     Pt states she had a severe allergic reaction at 7 y/o.  Statins-Hmg-Coa Reductase Inhibitors Myalgia    Metformin Other (comments)     Abdominal pain, diarrhea.  Codeine Other (comments)     Altered mental status       PMH, PSH, family history, social history, allergies reviewed with the patient with significant items noted above. Review of Systems   Review of Systems   Constitutional: Negative for chills and fever. HENT: Negative for sore throat. Eyes: Negative for visual disturbance. Respiratory: Positive for cough and shortness of breath. Cardiovascular: Negative for chest pain. Gastrointestinal: Negative for abdominal pain, nausea and vomiting. Genitourinary: Negative for difficulty urinating. Musculoskeletal: Negative for myalgias. Skin: Negative for rash. Neurological: Negative for headaches. Physical Exam   There were no vitals filed for this visit. Physical Exam  Vitals and nursing note reviewed. Constitutional:       General: She is not in acute distress. Appearance: Normal appearance. She is not ill-appearing or toxic-appearing. HENT:      Head: Normocephalic and atraumatic. Mouth/Throat:      Mouth: Mucous membranes are moist.   Eyes:      General: No scleral icterus. Conjunctiva/sclera: Conjunctivae normal.   Cardiovascular:      Rate and Rhythm: Normal rate and regular rhythm. Comments: Normal peripheral perfusion  Pulmonary:      Effort: No respiratory distress. Breath sounds: Wheezing present. Comments: Increased work of breathing, on 2 L/min nasal cannula  Abdominal:      General: There is no distension. Palpations: Abdomen is soft. Tenderness: There is no abdominal tenderness. Musculoskeletal:         General: No deformity. Normal range of motion. Cervical back: Normal range of motion and neck supple. Skin:     General: Skin is warm and dry. Findings: No rash. Neurological:      General: No focal deficit present. Mental Status: She is alert and oriented to person, place, and time. Mental status is at baseline. Psychiatric:         Mood and Affect: Mood normal.         Thought Content: Thought content normal.         Diagnostic Study Results     Labs -   No results found for this or any previous visit (from the past 12 hour(s)). Labs Reviewed   CBC WITH AUTOMATED DIFF   METABOLIC PANEL, BASIC   NT-PRO BNP   TROPONIN-HIGH SENSITIVITY   MAGNESIUM       Radiologic Studies -   XR CHEST PORT    (Results Pending)     CT Results  (Last 48 hours)    None        CXR Results  (Last 48 hours)    None          The laboratory results, imaging results, and other diagnostic exams were reviewed in the EMR. Medical Decision Making   I am the first provider for this patient. I reviewed the vital signs, available nursing notes, past medical history, past surgical history, family history and social history. Vital Signs-Reviewed the patient's vital signs. ED EKG interpretation:  Rhythm: normal sinus rhythm; and regular . Rate (approx.): 78; Axis: normal; P wave: normal; QRS interval: prolonged; ST/T wave: non-specific changes; Other findings: abnormal ekg. This EKG was interpreted by Antonio Guo D.O. Records Reviewed: Personally, on initial evaluation    MDM:   Jaziel Elaine presents with complaint of dyspnea  DDX includes but is not limited to: Acute COPD exacerbation, Covid, pneumonia    Patient overall in no acute distress, increased work of breathing however nontoxic in appearance. Will obtain lab work and imaging for further evaluation of patients complaint. Will continue to monitor and evaluate patient while in the ED.       Orders as below:  Orders Placed This Encounter    XR CHEST PORT    CBC WITH AUTOMATED DIFF    BASIC METABOLIC PANEL    PRO-BNP    TROPONIN-HIGH SENSITIVITY    MAGNESIUM    EKG, 12 LEAD, INITIAL    methylPREDNISolone (PF) (Solu-MEDROL) injection 125 mg    albuterol-ipratropium (DUO-NEB) 2.5 MG-0.5 MG/3 ML    azithromycin (ZITHROMAX) 500 mg in 0.9% sodium chloride 250 mL (VIAL-MATE)        ED Course:   ED Course as of 02/07/22 2123   Mon Feb 07, 2022   1622 Patient's chest x-ray does not show any acute findings or interval change. Lab work grossly within normal limits. We will continue to monitor patient. [DV]   8396 Patient's troponin 92, will obtain repeat. We will continue to monitor patient. [DV]   [id number] Patient states that she feels much improved. Nurse states that patient was complaining of headache and requesting pain medication. Will provide patient with Tylenol. We will continue to monitor patient. [DV]   4025 Patient's repeat troponin downtrending now 74. Patient with azithromycin already at home and recommended patient take 5 days worth of azithromycin for further treatment. Will discharge patient home with return precautions and follow-up recommendations. Patient verbalized understanding and is without any further questions. [DV]      ED Course User Index  [DV] Argenis Tidwell DO           Procedures:  Procedures        Diagnosis and Disposition     CLINICAL IMPRESSION:  No diagnosis found. Current Discharge Medication List          Disposition: Home    Patient condition at time of disposition: Stable    DISCHARGE NOTE:   Pt has been reexamined. Patient has no new complaints, changes, or physical findings. Care plan outlined and precautions discussed. Results were reviewed with the patient. All medications were reviewed with the patient. All of pt's questions and concerns were addressed. Alarm symptoms and return precautions associated with chief complaint and evaluation were reviewed with the patient in detail. The patient demonstrated adequate understanding.   Patient was instructed to follow up with PCP, as well as strict return precautions to the ED upon further deterioration. Patient is ready to go home. The patient is happy with this plan            Dragon Disclaimer     Please note that this dictation was completed with Netskope, the computer voice recognition software. Quite often unanticipated grammatical, syntax, homophones, and other interpretive errors are inadvertently transcribed by the computer software. Please disregard these errors. Please excuse any errors that have escaped final proofreading. Martin CONNORS

## 2022-02-08 LAB
ATRIAL RATE: 78 BPM
CALCULATED P AXIS, ECG09: 51 DEGREES
CALCULATED R AXIS, ECG10: 52 DEGREES
CALCULATED T AXIS, ECG11: -10 DEGREES
DIAGNOSIS, 93000: NORMAL
MAGNESIUM SERPL-MCNC: 1.6 MG/DL (ref 1.6–2.3)
P-R INTERVAL, ECG05: 150 MS
Q-T INTERVAL, ECG07: 404 MS
QRS DURATION, ECG06: 132 MS
QTC CALCULATION (BEZET), ECG08: 460 MS
VENTRICULAR RATE, ECG03: 78 BPM

## 2022-02-16 NOTE — PROGRESS NOTES
Patient is not available to be assessed at this time. Provided Spiritual Care Pamphlet.        [id number] [address]   [phone] No

## 2022-03-18 PROBLEM — J44.0 COPD WITH ACUTE BRONCHITIS (HCC): Status: ACTIVE | Noted: 2018-01-23

## 2022-03-18 PROBLEM — E55.9 VITAMIN D DEFICIENCY: Status: ACTIVE | Noted: 2020-10-28

## 2022-03-18 PROBLEM — K57.92 DIVERTICULITIS: Status: ACTIVE | Noted: 2020-10-28

## 2022-03-18 PROBLEM — H60.509 ACUTE OTITIS EXTERNA: Status: ACTIVE | Noted: 2020-10-28

## 2022-03-18 PROBLEM — R00.0 RACING HEART BEAT: Status: ACTIVE | Noted: 2020-10-28

## 2022-03-18 PROBLEM — R35.0 URINARY FREQUENCY: Status: ACTIVE | Noted: 2020-10-28

## 2022-03-18 PROBLEM — J20.9 COPD WITH ACUTE BRONCHITIS (HCC): Status: ACTIVE | Noted: 2018-01-23

## 2022-03-18 PROBLEM — F41.9 ANXIETY: Status: ACTIVE | Noted: 2020-10-28

## 2022-03-19 PROBLEM — J44.1 OBSTRUCTIVE CHRONIC BRONCHITIS WITH ACUTE EXACERBATION (HCC): Status: ACTIVE | Noted: 2020-10-28

## 2022-03-19 PROBLEM — G47.61 PERIODIC LIMB MOVEMENTS OF SLEEP: Status: ACTIVE | Noted: 2020-10-28

## 2022-03-19 PROBLEM — N39.0 URINARY TRACT INFECTION: Status: ACTIVE | Noted: 2020-10-28

## 2022-03-19 PROBLEM — E87.6 HYPOKALEMIA: Status: ACTIVE | Noted: 2020-07-09

## 2022-03-19 PROBLEM — M19.90 OSTEOARTHRITIS: Status: ACTIVE | Noted: 2020-10-28

## 2022-03-19 PROBLEM — J45.51 SEVERE PERSISTENT ASTHMA WITH EXACERBATION: Status: ACTIVE | Noted: 2020-08-21

## 2022-03-19 PROBLEM — J98.11 ATELECTASIS OF RIGHT LUNG: Status: ACTIVE | Noted: 2018-12-14

## 2022-03-19 PROBLEM — Z72.821 INADEQUATE SLEEP HYGIENE: Status: ACTIVE | Noted: 2020-10-28

## 2022-03-19 PROBLEM — R09.1 PLEURISY: Status: ACTIVE | Noted: 2020-10-28

## 2022-03-19 PROBLEM — K76.0 HEPATIC STEATOSIS: Status: ACTIVE | Noted: 2020-10-28

## 2022-03-19 PROBLEM — E78.5 HYPERLIPIDEMIA: Status: ACTIVE | Noted: 2018-01-24

## 2022-03-19 PROBLEM — K57.90 DIVERTICULOSIS: Status: ACTIVE | Noted: 2017-02-09

## 2022-03-19 PROBLEM — U07.1 PNEUMONIA DUE TO COVID-19 VIRUS: Status: ACTIVE | Noted: 2020-08-07

## 2022-03-19 PROBLEM — R00.2 PALPITATIONS: Status: ACTIVE | Noted: 2018-07-30

## 2022-03-19 PROBLEM — I50.9 HEART FAILURE (HCC): Status: ACTIVE | Noted: 2020-12-18

## 2022-03-19 PROBLEM — R09.89 BILATERAL CAROTID BRUITS: Status: ACTIVE | Noted: 2018-07-30

## 2022-03-19 PROBLEM — R91.1 LUNG NODULE: Status: ACTIVE | Noted: 2020-10-28

## 2022-03-19 PROBLEM — I50.32 DIASTOLIC CHF, CHRONIC (HCC): Status: ACTIVE | Noted: 2017-02-09

## 2022-03-19 PROBLEM — M81.0 OSTEOPOROSIS: Status: ACTIVE | Noted: 2020-10-28

## 2022-03-19 PROBLEM — U07.1 COVID-19: Status: ACTIVE | Noted: 2020-07-09

## 2022-03-19 PROBLEM — J44.1 COPD EXACERBATION (HCC): Status: ACTIVE | Noted: 2017-02-08

## 2022-03-19 PROBLEM — J12.82 PNEUMONIA DUE TO COVID-19 VIRUS: Status: ACTIVE | Noted: 2020-08-07

## 2022-03-19 PROBLEM — J45.901 ACUTE EXACERBATION OF COPD WITH ASTHMA (HCC): Status: ACTIVE | Noted: 2017-02-08

## 2022-03-19 PROBLEM — J44.1 ACUTE EXACERBATION OF COPD WITH ASTHMA (HCC): Status: ACTIVE | Noted: 2017-02-08

## 2022-03-19 PROBLEM — G47.10 HYPERSOMNIA: Status: ACTIVE | Noted: 2020-10-28

## 2022-03-20 PROBLEM — J44.1 ACUTE EXACERBATION OF CHRONIC OBSTRUCTIVE PULMONARY DISEASE (COPD) (HCC): Status: ACTIVE | Noted: 2018-10-07

## 2022-03-20 PROBLEM — Z88.9 H/O ALLERGY: Status: ACTIVE | Noted: 2020-10-28

## 2022-03-20 PROBLEM — I51.89 DIASTOLIC DYSFUNCTION: Status: ACTIVE | Noted: 2020-10-28

## 2022-03-20 PROBLEM — E11.21 TYPE 2 DIABETES WITH NEPHROPATHY (HCC): Status: ACTIVE | Noted: 2018-05-30

## 2022-03-20 PROBLEM — U07.1 COVID-19 VIRUS INFECTION: Status: ACTIVE | Noted: 2020-07-22

## 2022-03-20 PROBLEM — M62.08 DIASTASIS RECTI: Status: ACTIVE | Noted: 2020-10-28

## 2022-03-20 PROBLEM — J06.9 ACUTE RESPIRATORY DISEASE DUE TO COVID-19 VIRUS: Status: ACTIVE | Noted: 2020-07-22

## 2022-03-20 PROBLEM — U07.1 ACUTE RESPIRATORY DISEASE DUE TO COVID-19 VIRUS: Status: ACTIVE | Noted: 2020-07-22

## 2022-03-20 PROBLEM — R09.02 HYPOXIA: Status: ACTIVE | Noted: 2020-07-09

## 2022-03-20 PROBLEM — R10.11 RIGHT UPPER QUADRANT ABDOMINAL PAIN: Status: ACTIVE | Noted: 2020-10-28

## 2022-03-29 ENCOUNTER — APPOINTMENT (OUTPATIENT)
Dept: INFUSION THERAPY | Age: 62
End: 2022-03-29
Payer: MEDICARE

## 2022-03-31 ENCOUNTER — HOSPITAL ENCOUNTER (OUTPATIENT)
Dept: INFUSION THERAPY | Age: 62
Discharge: HOME | End: 2022-03-31
Payer: MEDICARE

## 2022-03-31 VITALS
HEART RATE: 75 BPM | DIASTOLIC BLOOD PRESSURE: 84 MMHG | TEMPERATURE: 98 F | OXYGEN SATURATION: 94 % | RESPIRATION RATE: 24 BRPM | SYSTOLIC BLOOD PRESSURE: 148 MMHG

## 2022-03-31 DIAGNOSIS — J45.51 SEVERE PERSISTENT ASTHMA WITH EXACERBATION: Primary | ICD-10-CM

## 2022-03-31 PROCEDURE — 74011250636 HC RX REV CODE- 250/636: Performed by: INTERNAL MEDICINE

## 2022-03-31 PROCEDURE — 96372 THER/PROPH/DIAG INJ SC/IM: CPT

## 2022-03-31 RX ADMIN — BENRALIZUMAB 30 MG: 30 INJECTION, SOLUTION SUBCUTANEOUS at 11:40

## 2022-03-31 NOTE — PROGRESS NOTES
\[facility]\"" Progress Note    Date: 2022    Name: Sandra Patel    MRN: [id number]         : 1960    Ms. Georgette Oliveros arrived in the [facility] today at [address] [place] [address] for her FASENRA Injection (Every 8 Weeks). She was assessed and education including discharge instructions was discussed with Ms Georgette Oliveros. She verbalized understanding. Ms. Mert Potter vitals were reviewed. Visit Vitals  BP (!) 148/84 (BP 1 Location: Left upper arm, BP Patient Position: Sitting)   Pulse 75   Temp 98 °F (36.7 °C)   Resp 24   SpO2 94%   Breastfeeding No       Benralizumab (FASENRA) 30 mg, was administered SQ, in the back of her left arm. Ms. Georgette Oliveros tolerated well and voiced no complaints. Ms. Georgette Oliveros was discharged from [place] [address] in stable condition at 1145. She is to return on 2022 at 1130 for her next [facility] appt.       Kayla Chandler  2022

## 2022-05-26 ENCOUNTER — HOSPITAL ENCOUNTER (OUTPATIENT)
Dept: INFUSION THERAPY | Age: 62
Discharge: HOME | End: 2022-05-26
Payer: MEDICARE

## 2022-05-26 VITALS
SYSTOLIC BLOOD PRESSURE: 175 MMHG | OXYGEN SATURATION: 95 % | HEART RATE: 86 BPM | DIASTOLIC BLOOD PRESSURE: 80 MMHG | RESPIRATION RATE: 28 BRPM | TEMPERATURE: 97 F

## 2022-05-26 DIAGNOSIS — J45.51 SEVERE PERSISTENT ASTHMA WITH EXACERBATION: Primary | ICD-10-CM

## 2022-05-26 PROCEDURE — 96372 THER/PROPH/DIAG INJ SC/IM: CPT

## 2022-05-26 PROCEDURE — 74011250636 HC RX REV CODE- 250/636: Performed by: INTERNAL MEDICINE

## 2022-05-26 RX ADMIN — BENRALIZUMAB 30 MG: 30 INJECTION, SOLUTION SUBCUTANEOUS at 11:56

## 2022-05-26 NOTE — PROGRESS NOTES
[facility] Progress Note    Date: May 26, 2022    Name: Angela Perez    MRN: [id number]         : 1960    Ms. Oral Harada arrived in the [facility] today at 1140, in stable condition, here for her FASENRA Injection (Every 8 Weeks). She was assessed and education was provided. Ms. Uma Koroma vitals were reviewed. Visit Vitals  BP (!) 175/80 (BP 1 Location: Left upper arm, BP Patient Position: Sitting)   Pulse 86   Temp 97 °F (36.1 °C)   Resp 28   SpO2 95%   Breastfeeding No       .  Benralizumab (FASENRA) 30 mg, was administered SQ, in the back of her left arm at 1156, per order and without incident. Ms. Oral Harada tolerated well and voiced no complaints. Ms. Oral Harada was discharged from [place] [address] in stable condition at 1200. She is to return in 8 weeks, on Thursday, 22 at 1130, for her next appointment, for her next [facility] Injection.      Steff Ghosh RN  May 26, 2022  11:40 AM

## 2022-06-08 ENCOUNTER — TELEPHONE (OUTPATIENT)
Dept: PULMONOLOGY | Age: 62
End: 2022-06-08

## 2022-06-08 NOTE — TELEPHONE ENCOUNTER
Jalil from [facility] needs updated notes for pt for her 02 they only have the notes from 6/13/2018.

## 2022-06-09 PROBLEM — K59.04 CHRONIC IDIOPATHIC CONSTIPATION: Status: ACTIVE | Noted: 2021-12-13

## 2022-06-09 RX ORDER — POLYETHYLENE GLYCOL 3350 17 G/17G
17 POWDER, FOR SOLUTION ORAL AS NEEDED
COMMUNITY

## 2022-06-09 RX ORDER — IPRATROPIUM BROMIDE 0.5 MG/2.5ML
0.5 SOLUTION RESPIRATORY (INHALATION)
COMMUNITY
Start: 2022-03-29

## 2022-06-09 RX ORDER — INSULIN LISPRO 100 [IU]/ML
5-12 INJECTION, SOLUTION INTRAVENOUS; SUBCUTANEOUS
COMMUNITY
Start: 2021-07-28

## 2022-06-15 ENCOUNTER — OFFICE VISIT (OUTPATIENT)
Dept: PULMONOLOGY | Age: 62
End: 2022-06-15
Payer: MEDICAID

## 2022-06-15 VITALS
SYSTOLIC BLOOD PRESSURE: 149 MMHG | HEART RATE: 67 BPM | HEIGHT: 64 IN | TEMPERATURE: 98.5 F | WEIGHT: 218 LBS | DIASTOLIC BLOOD PRESSURE: 66 MMHG | BODY MASS INDEX: 37.22 KG/M2 | OXYGEN SATURATION: 96 % | RESPIRATION RATE: 18 BRPM

## 2022-06-15 DIAGNOSIS — I27.20 PULMONARY HYPERTENSION (HCC): ICD-10-CM

## 2022-06-15 DIAGNOSIS — J96.11 CHRONIC RESPIRATORY FAILURE WITH HYPOXIA AND HYPERCAPNIA (HCC): Primary | ICD-10-CM

## 2022-06-15 DIAGNOSIS — Z87.891 PERSONAL HISTORY OF TOBACCO USE, PRESENTING HAZARDS TO HEALTH: ICD-10-CM

## 2022-06-15 DIAGNOSIS — J96.12 CHRONIC RESPIRATORY FAILURE WITH HYPOXIA AND HYPERCAPNIA (HCC): Primary | ICD-10-CM

## 2022-06-15 DIAGNOSIS — E66.01 SEVERE OBESITY (HCC): ICD-10-CM

## 2022-06-15 DIAGNOSIS — J44.9 COPD, GROUP D, BY GOLD 2017 CLASSIFICATION (HCC): ICD-10-CM

## 2022-06-15 DIAGNOSIS — R53.81 PHYSICAL DECONDITIONING: ICD-10-CM

## 2022-06-15 DIAGNOSIS — J44.9 COPD WITH ASTHMA (HCC): ICD-10-CM

## 2022-06-15 PROCEDURE — 99214 OFFICE O/P EST MOD 30 MIN: CPT | Performed by: INTERNAL MEDICINE

## 2022-06-15 RX ORDER — FLUTICASONE PROPIONATE 220 UG/1
2 AEROSOL, METERED RESPIRATORY (INHALATION) DAILY
COMMUNITY
Start: 2022-09-23 | End: 2022-09-23

## 2022-06-15 RX ORDER — PREDNISONE 10 MG/1
5 TABLET ORAL DAILY
COMMUNITY
Start: 2022-06-13 | End: 2022-09-23

## 2022-06-15 NOTE — PROGRESS NOTES
[address], [facility] [address] [facility]  Pulmonary, Critical Care, and Sleep Medicine    Pulmonary Office F/U  Name: Linda Alford 57 y.o. female  MRN: [id number]  : 1960  Service Date: 06/15/22  Chief Complaint:   Chief Complaint   Patient presents with    COPD     follow up from 2021    Asthma    Other     respiratory failure, obesity hypoventilation syndrome, CHAUDHARY, pulmonary HTN    Results     LDCT 11/3/2021       History of Present Illness:  (pt accompanied by daughter)  Linda Alford is a 57 y.o. female, who presents to Pulmonary clinic for F/U of asthma/COPD. Pt last seen in our clinic on 21. In the interval, pt had 2 severe exacerbations requiring hospitalization this year. She was hospitalized to [facility] in January and again in . Pt reports that she had exacerbation in April and then worsened coming down to her baseline steroid dose, so she then presented to [facility] where she was admitted for about 5 days. Treated with usual ABx, steroids, bronchodilators. Pt discharged on prednisone taper. Pt on 60mg - tapering every 3 days. Usual chronic prednisone dose is 5/2.5mg on alternating days. Reporting black particulate in her Trilogy  Using nebs every 3-4 hours  Put back on mucinex, now having productive cough with white sputum.   mMRC of 4    Past Medical History:   Diagnosis Date    Asthma     Chronic lung disease     COPD     Cystocele, midline     Diabetes mellitus ([address]     GERD (gastroesophageal reflux disease)     Hidradenitis suppurativa     Hyperlipidemia     Hypertension     SHERRIE on CPAP     CPAP    Stress incontinence      Past Surgical History:   Procedure Laterality Date    HX APPENDECTOMY      HX CATARACT REMOVAL Right     HX CHOLECYSTECTOMY      HX DILATION AND CURETTAGE      Dysfunctional uterine bleeding, thought 2/2 fibroids    HX HEART CATHETERIZATION  12/2020    HX TUBAL LIGATION      PA BREAST SURGERY PROCEDURE UNLISTED      Right breast benign tumor removal     Family History   Problem Relation Age of Onset    Hypertension Mother     Stroke Mother     Breast Cancer Mother         Bilateral mastectomies    Cancer Mother         ovarian and breast    Heart Failure Mother     Ovarian Cancer Mother     Heart Attack Father         2011    Heart Surgery Father         CABG    Heart Failure Father     COPD Sister         Heavy smoker    Cancer Sister         ovarian    Heart Failure Sister     Lung Disease Sister     Asthma Child     Cancer Maternal Aunt         pancreatic    Cancer Maternal Grandfather         stomach     Social History     Socioeconomic History    Marital status: LEGALLY      Spouse name: Not on file    Number of children: Not on file    Years of education: Not on file    Highest education level: Not on file   Occupational History    Not on file   Tobacco Use    Smoking status: Former Smoker     Packs/day: 1.00     Years: 30.00     Pack years: 30.00     Types: Cigarettes     Start date: 5/6/1966     Quit date: 9/6/2006     Years since quitting: 15.7    Smokeless tobacco: Former User    Tobacco comment: 1-1.5 packs per day   Vaping Use    Vaping Use: Never used   Substance and Sexual Activity    Alcohol use: No    Drug use: No    Sexual activity: Not Currently   Other Topics Concern    Not on file   Social History Narrative    Not on file     Social Determinants of Health     Financial Resource Strain:     Difficulty of Paying Living Expenses: Not on file   Food Insecurity:     Worried About Running Out of Food in the Last Year: Not on file    [place] of Food in the Last Year: Not on file   Transportation Needs:     Lack of Transportation (Medical): Not on file    Lack of Transportation (Non-Medical):  Not on file   Physical Activity:     Days of Exercise per Week: Not on file    Minutes of Exercise per Session: Not on file   Stress:     Feeling of Stress : Not on file   Social Connections:     Frequency of Communication with Friends and Family: Not on file    Frequency of Social Gatherings with Friends and Family: Not on file    Attends Baptism Services: Not on file    Active Member of [address] [place] [address] or Organizations: Not on file    Attends Club or Organization Meetings: Not on file    Marital Status: Not on file   Intimate Partner Violence:     Fear of Current or Ex-Partner: Not on file    Emotionally Abused: Not on file    Physically Abused: Not on file    Sexually Abused: Not on file   Housing Stability:     Unable to Pay for Housing in the Last Year: Not on file    Number of [place] in the Last Year: Not on file    Unstable Housing in the Last Year: Not on file     Allergies   Allergen Reactions    Ancef [Cefazolin] Hives    Contrast Agent [Iodine] Anaphylaxis, Shortness of Breath and Swelling     Needs pre-medication for IV contrast with Benadryl, Solu-Medrol    Fish Containing Products Anaphylaxis     Pt states she had a severe allergic reaction at 7 y/o.  Statins-Hmg-Coa Reductase Inhibitors Myalgia    Metformin Other (comments)     Abdominal pain, diarrhea.  Codeine Other (comments)     Altered mental status     Prior to Admission medications    Medication Sig Start Date End Date Taking? Authorizing Provider   predniSONE (DELTASONE) 10 mg tablet Take 5 mg by mouth daily. 6/13/22  Yes Provider, Historical   fluticasone propionate (Flovent HFA) 220 mcg/actuation inhaler Take 2 Puffs by inhalation daily. Yes Provider, Historical   insulin lispro (HUMALOG) 100 unit/mL kwikpen 5-12 Units. 7/28/21  Yes Provider, Historical   ipratropium (ATROVENT) 0.02 % soln 0.5 mg by Nebulization route every four (4) hours as needed. 3/29/22  Yes Provider, Historical   polyethylene glycol (MIRALAX) 17 gram/dose powder Take 17 g by mouth as needed.    Yes Provider, Historical metoclopramide HCl (Reglan) 5 mg tablet Take 5 mg by mouth Before breakfast, lunch, and dinner. Yes Provider, Historical   furosemide (LASIX) 40 mg tablet Take 1 tablet by mouth twice daily  Patient taking differently: Take 40 mg by mouth two (2) times a day. 10/1/21  Yes Tomasz Jackson MD   montelukast (Singulair) 10 mg tablet Take 1 Tablet by mouth nightly. 8/31/21  Yes Meera Noble MD   fluticasone propionate (FLONASE) 50 mcg/actuation nasal spray 2 Sprays by Both Nostrils route daily. 8/31/21  Yes Meera Noble MD   azithromycin [facility]) 250 mg tablet Take 1 Tab by mouth every Monday, Wednesday, Friday. 4/28/21  Yes Meera Noble MD   tiotropium bromide (Spiriva Respimat) 2.5 mcg/actuation inhaler Take 2 Puffs by inhalation daily. 4/27/21  Yes Meera Noble MD   fluticasone propion-salmeteroL (ADVAIR HFA) 182-35 mcg/actuation inhaler Take 2 Puffs by inhalation two (2) times a day. 4/27/21  Yes Meera Noble MD   simethicone (GAS-X) 125 mg capsule Take 125 mg by mouth four (4) times daily as needed for Flatulence. Yes Provider, Historical   insulin glargine (LANTUS,BASAGLAR) 100 unit/mL (3 mL) inpn 50 Units by SubCUTAneous route nightly. And 5 units in the morning  Indications: type 2 diabetes mellitus   Yes Provider, Historical   albuterol-ipratropium (DUO-NEB) 2.5 mg-0.5 mg/3 ml nebu 3 mL by Nebulization route every six (6) hours as needed for Wheezing. Yes Provider, Historical   glucose blood VI test strips (FreeStyle Lite Strips) strip Use four times daily for blood sugar checks. 6/16/17  Yes Provider, Historical   Blood-Glucose Meter (FreeStyle Freedom Lite) monitoring kit CHECK BLOOD SUGARS B.I.D. E11.9 6/16/17  Yes Provider, Historical   lancets (FreeStyle Lancets) 28 gauge misc Use four times daily for blood sugar checks 6/16/17  Yes Provider, Historical   Insulin Needles, Disposable, (BD Ultra-Fine Mini Pen Needle) 31 gauge x 3/16\" ndle Use with Basaglar once daily.  4/4/18  Yes Provider, Historical   Insulin Syringe-Needle U-100 0.3 mL 31 gauge x 5/16\" syrg USE AS DIRECTED 2/14/17  Yes Provider, Historical   acetaminophen (TYLENOL) 500 mg tablet Take 500 mg by mouth every six (6) hours as needed for Fever or Pain. Yes Provider, Historical   albuterol (PROVENTIL HFA, VENTOLIN HFA, PROAIR HFA) 90 mcg/actuation inhaler Take 2 Puffs by inhalation every four (4) hours as needed for Wheezing. 7/7/20  Yes Evonne Kate PA-C   omeprazole (PRILOSEC) 40 mg capsule Take 40 mg by mouth daily. 3/9/18  Yes Provider, Historical   lisinopril (PRINIVIL, ZESTRIL) 20 mg tablet Take 20 mg by mouth daily. Yes Provider, Historical   OXYGEN-AIR DELIVERY SYSTEMS 2 L by Nasal route continuous. First Choice between 2L and 3L   Yes Provider, Historical   aspirin delayed-release 81 mg tablet Take 81 mg by mouth daily. Yes Provider, Historical   empagliflozin (Jardiance) 10 mg tablet Take 10 mg by mouth daily.   Patient not taking: Reported on 6/15/2022 10/22/21 6/15/22  Provider, Historical   Prolia 60 mg/mL injection INJECT 60MG SUBCUTANEOUSLY AS A SINGLE DOSE ONCE EVERY 6 MONTHS  Patient not taking: Every 6 months 11/23/20   Provider, Historical   NOVOLOG FLEXPEN U-100 INSULIN 100 unit/mL inpn Continue home Sliding scale insulin as prior to admission  Patient not taking: Reported on 6/15/2022 7/10/18   Elizabeth David MD     Immunization History   Administered Date(s) Administered    Influenza Vaccine 09/21/2009, 10/21/2011, 12/21/2012, 01/07/2014, 10/20/2014, 10/01/2015, 10/07/2016, 12/31/2019, 11/18/2020, 11/01/2021    Influenza Vaccine (Quad) PF (>6 Mo Flulaval, Fluarix, and >3 Yrs Afluria, Fluzone 22607) 01/26/2018, 10/11/2018    Novel Influenza-H1N1-09, All Formulations 01/29/2010    Pneumococcal Conjugate (PCV-13) 12/31/2019    Pneumococcal Polysaccharide (PPSV-23) 10/11/2018       Review of Systems:  A complete review of systems was performed as stated in the HPI, all others are negative. Objective:    Physical Exam:  BP (!) 149/66 (BP 1 Location: Left upper arm, BP Patient Position: Sitting, BP Cuff Size: Adult)   Pulse 67   Temp 98.5 °F (36.9 °C) (Temporal)   Resp 18   Ht 5' 4\" (1.626 m)   Wt 98.9 kg (218 lb)   SpO2 96% Comment: set @ 2L (pulse)  BMI 37.42 kg/m²   Vitals were personally reviewed  Gen: no acute distress, pleasant and cooperative, sitting up in wheelchair, wearing supplemental oxygen, obese  HEENT: normocephalic/atraumatic, PERRLA, EOMI, no scleral icterus, nasal bridge midline, unable to assess nasal and oral cavities due to patient wearing mask in the setting of COVID-19 pandemic  Neck: supple, trachea midline, no JVD  CVS: regular rate rhythm, S1/S2, no murmurs/rubs/gallops  Lungs: poor air entry B/L, CTABL, no wheezes/rales/rhonchi  Psych: normal memory, thought content, and processing    Labs: I have reviewed the patient's available labs  Lab Results   Component Value Date/Time    WBC 7.6 02/07/2022 03:45 PM    HGB 13.0 02/07/2022 03:45 PM    HCT 38.8 02/07/2022 03:45 PM    PLATELET 035 45/07/5665 03:45 PM    MCV 85.7 02/07/2022 03:45 PM     Lab Results   Component Value Date/Time    Sodium 138 02/07/2022 03:45 PM    Potassium 3.9 02/07/2022 03:45 PM    Chloride 100 02/07/2022 03:45 PM    CO2 32 02/07/2022 03:45 PM    Anion gap 6 02/07/2022 03:45 PM    Glucose 208 (H) 02/07/2022 03:45 PM    BUN 12 02/07/2022 03:45 PM    Creatinine 0.90 02/07/2022 03:45 PM    BUN/Creatinine ratio 13 02/07/2022 03:45 PM    GFR est AA >60 02/07/2022 03:45 PM    GFR est non-AA >60 02/07/2022 03:45 PM    Calcium 10.3 (H) 02/07/2022 03:45 PM    Bilirubin, total 0.4 07/01/2021 04:50 AM    Alk.  phosphatase 86 07/01/2021 04:50 AM    Protein, total 7.3 07/01/2021 04:50 AM    Albumin 3.5 07/01/2021 04:50 AM    Globulin 3.8 07/01/2021 04:50 AM    A-G Ratio 0.9 07/01/2021 04:50 AM    ALT (SGPT) 32 07/01/2021 04:50 AM    AST (SGOT) 14 07/01/2021 04:50 AM     Imaging:  I have personally reviewed patient's imaging as follows: LDCT from 11/3/2021 shows scattered centrilobular emphysema worse in bilateral upper lobes along with some linear atelectasis, no nodules, masses, consolidations, infiltrates, effusions seen. Official report per radiology:  CT Results (most recent):  Results from Hospital Encounter encounter on 11/03/21    CT LOW DOSE LUNG CANCER SCREENING    Narrative  CT of the chest for lung cancer screening    HISTORY: Lung screening. COPD, tobacco use history, group D. Meet lung  screening criteria. COMPARISON: 11/2/20    TECHNIQUE: Low dose unenhanced multislice helical CT was performed from the  thoracic inlet to the adrenal glands without intravenous contrast  administration. Contiguous [1.25 mm] axial images were reconstructed and lung  and soft tissue windows were generated. Coronal MIP and sagittal reformations  were generated.  -All CT scans at this facility performed using dose optimization techniques as  appreciated to a performed exam, to include automated exposure control,  adjustment of the mA and or KU according to patient size (including appropriate  matching for site specific examination), or use of iterative reconstruction  technique. FINDINGS:    The 3 mm nodule at lateral right upper lobe measures 2 mm today, #97/3. Mild  emphysema and chronic bronchitis appear stable. The lungs are clear of new  nodule, mass, airspace disease or edema. There is no pleural effusion. The absence of intravenous contrast reduces the sensitivity for evaluation of  the mediastinum and upper abdominal organs. No mediastinal or hilar adenopathy  appreciated. The heart is not enlarged. No pericardial effusion. Mild coronary  artery calcifications present. The aorta has a normal contour with no evidence  of aneurysm. The thyroid appears unremarkable. The bones and soft tissues of  the chest wall are within normal limits.  The visualized portions of the upper  abdominal organs are normal.    Impression  1. The tiny right upper lobe nodule seems to be smaller, probably due to tiny  size and technical difference. No new nodule seen. 2. Moderate emphysema and bronchitis. LUNG RADS ASSESSMENT CATEGORIES:    Lung RADS 2 - Benign appearance or behavior. Management:  Continue annual screening with low dose CT in 12 months. Thank you for your referral.      PFTs:  I have reviewed the patient's PFTs -- no new studies    TTE:  I have reviewed the patient's TTE results  05/21/20   ECHO ADULT COMPLETE 05/25/2020 5/25/2020    Narrative · Image quality for this study was technically difficult. Contrast used:   DEFINITY. · Normal cavity size and systolic function (ejection fraction normal). Moderate concentric hypertrophy. Estimated left ventricular ejection   fraction is 65 - 70%. Visually measured ejection fraction. No regional   wall motion abnormality noted. Moderate (grade 2) left ventricular   diastolic dysfunction. · Mildly dilated left atrium. · Pulmonary hypertension. Pulmonary arterial systolic pressure is 61 mmHg. · Severely elevated central venous pressure (15+ mmHg); IVC diameter is   larger than 21 mm and collapses less than 50% with respiration. Signed by: Timoteo Cox MD   Cardiac catheter result reviewed from 12/21/2020 from Dr. Elmer Jeffries, reports that patient has elevated LVEDP (27) consistent with fluid overload/CHF, normal LV function, no wall motion abnormalities, moderate two-vessel coronary artery disease to treat medically. NIPPV/trilogy compliance report reviewed in clinic from 4/4 through 5/3/2022  100% compliance  Average minute ventilation 9.4 L/min  Average tidal volume 459.6 mL  Average breaths per minutes 20.3  Percentage of days used 4 hours or more 73.3%      Assessment and Plan:  57 y.o. female with:    Impression:  1. COPD/asthma overlap syndrome:  Pt on dual ICS/LABA/LTRA/LAMA + anti-IL5R + chronic prednisone (5/2.5mg) therapy.   Pt had another 2 exacerbations requiring hospitalization in Jan and June to [facility], now on prednisone taper  2. Underlying poorly controlled severe persistent asthma with steroid dependence: Patient has a strong asthma component with significant bronchospasm to a wide variety of classic environmental triggers, was on SCIT therapy in the past.  Despite chronic steroids, patient's absolute eosinophil count was 100 on 6/3/2020 and 200 on 3/24/2020. Patient on benralizumab injections, and notes significant improvement. Pt also on dual ICS/LABA/LAMA/LTRA/anti-IL5R therapy  3. COPD, gold risk category D  4. Chronic hypoxic and hypercapnic respiratory failure  5. Severe obesity: Body mass index is 37.42 kg/m². 6.  Obesity hypoventilation syndrome with SHERRIE: Patient on trilogy in the AVAPS-AE setting with good compliance  7. Dyspnea/shortness of breath: Multifactorial, due to above as well as CHFpEF  8. Chronic rhinitis  9. Chronic steroid dependence: Noted above  10. CHFpEF with previous exacerbations: Seen on transthoracic echo, grade 2 diastolic dysfunction and moderate concentric hypertrophy with dilated LA and severe pulmonary hypertension  11. Pulm HTN: Secondary to WHO group 2 and group 3 disease. Elevated LVEDP on cardiac cath from 12/21/20  12. History of tobacco use: Quit smoking in 2006  13. Osteoporosis: Seen on most recent DEXA scan from 11/2/2020. Secondary to chronic steroid use. Pt on Prolia injections by PCP  14. Glaucoma and cataracts: Follows with ophthalmology, likely exacerbated by chronic steroid use    Plan:  -refer to allergy/immunology for consideration for SCIT therapy  -Continue prednisone taper as prescribed from hospitalization, pt will taper down to baseline chronic dose. Baseline dose increased to 5mg daily -- at this point benefits outweigh risks given repeated exacerbations and severity of sx  -Follow-up with cardiology with regards to CHF.   CHF lifestyle precautions including fluid restriction, daily weights, low-sodium diet, etc.  -LDCT reviewed in clinic, this completes 15 years of surveillance post smoking cessation  -Management of osteoporosis per PCP  -Continue chronic azithromycin therapy 250 mg p.o. every Monday/Wednesday/Friday  -Continue dual ICS/LABA/LAMA/LTRA/anti-IL5R therapy   Given reduction in exacerbations and pt noting improvement while on therapy, will continue Fasenra therapy SQ [id number]   Continue Advair /21 MCG 2 puffs twice daily. Counseled patient rinse mouth thoroughly after use   Continue Flovent  mcg 1 puff twice daily. Counseled patient to rinse mouth well after each use   Continue Spiriva Respimat 2 puffs daily   Continue Singulair 10 mg nightly   Continue DuoNebs as needed   Continue Albuterol HFA 1-2puffs q4-6h PRN. Counseled patient that this is their rescue inhaler. Also counseled patient regarding premedication 15-30m prior to exercise or exposure to very cold air  -Counseled patient on proper inhaler technique  -Counseled patient to avoid potential triggers of asthma  -Counseled regarding weight loss  -Follow-up with ophthalmology as scheduled.  -Continue management of osteoporosis by PCP  -Continue loratadine daily  -Continue trilogy nightly and PRN during daytime, congratulated patient on good compliance. Counseled patient to change mask/tubing/filters every 3 to 6 months.   Counseled patient against sleepy driving.  -Continue supplemental oxygen, 2-3L pulse dosing at all times and qhs  -Weight loss  -Advised patient to followup with pulmonary rehab to begin graded exercise  -Immunizations reviewed, influenza and pneumococcal vaccinations up-to-date  -Counseled patient regarding lifestyle precautions in COVID-19 pandemic including wearing mask in public and confined spaces, social/physical distancing, frequent hand hygiene, etc.  Advised pt to receive COVID-19 vaccination when possible        Follow-up and Dispositions    · Return in about 2 months (around 8/15/2022). Orders Placed This Encounter    AMB SUPPLY ORDER    REFERRAL TO ALLERGY    predniSONE (DELTASONE) 10 mg tablet     This patient has a high complexity chronic care condition   This Visit needed High complexity medically necessary decision making and management plans.       Apple Quintero MD/MPH     Pulmonary, Critical Care Medicine  [facility]

## 2022-06-15 NOTE — PROGRESS NOTES
Order placed for Astral, per Verbal Order from Dr. Lobo Pérez on 6/15/2022. Last office visit: 6/15/2022  Follow up Visit: TBD    Provider is aware of last office visit and follow up. No further action requested from provider.

## 2022-06-15 NOTE — PROGRESS NOTES
Christel Cook presents today for   Chief Complaint   Patient presents with    COPD     follow up from 8/31/2021    Asthma    Other     respiratory failure, obesity hypoventilation syndrome, CHAUDHARY, pulmonary HTN    Results     LDCT 11/3/2021       Is someone accompanying this pt? Yes. caretaker    Is the patient using any DME equipment during [address]? Yes. NIV, O2, nebulizer, walker, wheelchair    -[employer]    Depression Screening:  3 most recent PHQ Screens 6/15/2022   Little interest or pleasure in doing things Not at all   Feeling down, depressed, irritable, or hopeless Not at all   Total Score PHQ 2 0       Learning Assessment:  Learning Assessment 4/9/2018   PRIMARY LEARNER Patient   HIGHEST LEVEL OF EDUCATION - PRIMARY LEARNER  SOME COLLEGE   BARRIERS PRIMARY LEARNER -   PRIMARY LANGUAGE ENGLISH   LEARNER PREFERENCE PRIMARY READING   ANSWERED BY patient Royal Downing   RELATIONSHIP SELF       Abuse Screening:  Abuse Screening Questionnaire 6/15/2022   Do you ever feel afraid of your partner? N   Are you in a relationship with someone who physically or mentally threatens you? N   Is it safe for you to go home? Y       Fall Risk  Fall Risk Assessment, last 12 mths 6/15/2022   Able to walk? Yes   Fall in past 12 months? 0   Do you feel unsteady? 1   Are you worried about falling 1   Is the gait abnormal? 1   Number of falls in past 12 months 0   Fall with injury? -         Coordination of Care:  1. Have you been to the ER, urgent care clinic since your last visit? Hospitalized since your last visit?  Yes; Where: [address] [facility] ED, When: 9/9-9/13/2021-dyspnea & abdominal pain, 10/23/2021-COPD with asthma exacerbation, 10/26-10/28/2021-respiratory distress, PNA, COPD exacerbation & DM, 1/26-1/31/2022-COPD exacerbation, 2/7/2022-COPD exacerbation, 5/4/2022-SOB & 6/9-6/13/2022-COPD with asthma exacerbation, hypomagnesemia, essential HTn, impaired mobility & ADLs & obstructive chronic bronchitis    2. Have you seen or consulted any other health care providers outside of the [address] [place] [address] since your last visit? Include any pap smears or colon screening. Yes. Dr. Zia Gamez, PCP    Per patient, has not had COVID vaccine.

## 2022-06-15 NOTE — LETTER
6/19/2022    Patient: Marycruz Nice   YOB: 1960   Date of Visit: 6/15/2022     Raleigh Garcia MD  [address] [facility]    Dear Raleigh Garcia MD,      Thank you for referring Ms. Romain Simms to [address] [place] [address] for evaluation. My notes for this consultation are attached. If you have questions, please do not hesitate to call me. I look forward to following your patient along with you.       Sincerely,    Trung Lisa MD

## 2022-06-25 DIAGNOSIS — J44.9 COPD, GROUP D, BY GOLD 2017 CLASSIFICATION (HCC): ICD-10-CM

## 2022-06-28 RX ORDER — AZITHROMYCIN 250 MG/1
TABLET, FILM COATED ORAL
Qty: 30 TABLET | Refills: 0 | Status: SHIPPED | OUTPATIENT
Start: 2022-06-28

## 2022-07-22 ENCOUNTER — HOSPITAL ENCOUNTER (OUTPATIENT)
Dept: INFUSION THERAPY | Age: 62
Discharge: HOME | End: 2022-07-22
Payer: MEDICARE

## 2022-07-22 VITALS
DIASTOLIC BLOOD PRESSURE: 78 MMHG | HEART RATE: 86 BPM | TEMPERATURE: 97.5 F | RESPIRATION RATE: 22 BRPM | OXYGEN SATURATION: 94 % | SYSTOLIC BLOOD PRESSURE: 170 MMHG

## 2022-07-22 DIAGNOSIS — J45.51 SEVERE PERSISTENT ASTHMA WITH EXACERBATION: Primary | ICD-10-CM

## 2022-07-22 PROCEDURE — 96372 THER/PROPH/DIAG INJ SC/IM: CPT

## 2022-07-22 PROCEDURE — 74011250636 HC RX REV CODE- 250/636: Performed by: INTERNAL MEDICINE

## 2022-07-22 RX ADMIN — BENRALIZUMAB 30 MG: 30 INJECTION, SOLUTION SUBCUTANEOUS at 14:21

## 2022-07-22 NOTE — PROGRESS NOTES
[facility] Progress Note    Date: 2022    Name: Earlene Up    MRN: [id number]         : 1960    Ms. Laurie Csota arrived in the [facility] today at 1410 for her FASENRA Injection (Every 8 Weeks). She was assessed and education including discharge instructions was discussed with Ms Laurie Costa. She verbalized understanding. Ms. Loree Larkin vitals were reviewed. Visit Vitals  BP (!) 170/78 (BP 1 Location: Left upper arm, BP Patient Position: Sitting)   Pulse 86   Temp 97.5 °F (36.4 °C)   Resp 22   SpO2 94%   Breastfeeding No       Benralizumab (FASENRA) 30 mg, was administered SQ, in the back of her right arm. Ms. Laurie Costa tolerated well and voiced no complaints. Ms. Laurie Costa was discharged from [place] [address] in stable condition at 1425. She is to return on 22 at 1400 [facility] apptSacha Earl  2022

## 2022-07-26 ENCOUNTER — TRANSCRIBE ORDER (OUTPATIENT)
Dept: SCHEDULING | Age: 62
End: 2022-07-26

## 2022-07-26 DIAGNOSIS — M81.0 OSTEOPOROSIS: Primary | ICD-10-CM

## 2022-09-14 RX ORDER — EPINEPHRINE 0.3 MG/.3ML
0.3 INJECTION SUBCUTANEOUS
COMMUNITY
Start: 2022-07-26

## 2022-09-14 RX ORDER — AZELASTINE 1 MG/ML
2 SPRAY, METERED NASAL AS NEEDED
COMMUNITY
Start: 2022-07-26

## 2022-09-15 RX ORDER — ACETAMINOPHEN 500 MG
5000 TABLET ORAL
COMMUNITY

## 2022-09-15 RX ORDER — BUDESONIDE 1 MG/2ML
1 INHALANT ORAL DAILY
COMMUNITY
End: 2022-09-23 | Stop reason: SDUPTHER

## 2022-09-15 RX ORDER — POTASSIUM CHLORIDE 20 MEQ/1
20 TABLET, EXTENDED RELEASE ORAL DAILY
COMMUNITY

## 2022-09-16 ENCOUNTER — HOSPITAL ENCOUNTER (OUTPATIENT)
Dept: INFUSION THERAPY | Age: 62
Discharge: HOME | End: 2022-09-16
Payer: MEDICARE

## 2022-09-16 VITALS
SYSTOLIC BLOOD PRESSURE: 146 MMHG | OXYGEN SATURATION: 92 % | DIASTOLIC BLOOD PRESSURE: 77 MMHG | HEART RATE: 78 BPM | RESPIRATION RATE: 28 BRPM | TEMPERATURE: 97.8 F

## 2022-09-16 DIAGNOSIS — J45.51 SEVERE PERSISTENT ASTHMA WITH EXACERBATION: Primary | ICD-10-CM

## 2022-09-16 PROCEDURE — 96372 THER/PROPH/DIAG INJ SC/IM: CPT

## 2022-09-16 PROCEDURE — 74011250636 HC RX REV CODE- 250/636: Performed by: INTERNAL MEDICINE

## 2022-09-16 RX ADMIN — BENRALIZUMAB 30 MG: 30 INJECTION, SOLUTION SUBCUTANEOUS at 14:20

## 2022-09-16 NOTE — PROGRESS NOTES
[facility] Progress Note    Date: 2022    Name: Janene Maxwell    MRN: [id number]         : 1960    Ms. Aimee Cho arrived in the [facility] today at 1400, in stable condition, here for her FASENRA Injection (Every 8 Weeks). She was assessed and education was provided. Ms. Warner Luu vitals were reviewed. Visit Vitals  BP (!) 146/77 (BP 1 Location: Right upper arm, BP Patient Position: Sitting)   Pulse 78   Temp 97.8 °F (36.6 °C)   Resp 28   SpO2 92%   Breastfeeding No       .  Benralizumab (FASENRA) 30 mg, was administered SQ, in the back of her left arm at 1420, per order and without incident. Ms. Aimee Cho tolerated well and voiced no complaints. Ms. Aimee Cho was discharged from [place] [address] in stable condition at 1425. She is to return in 8 weeks, on Friday, 22 at 1330, for her next appointment, for her next [facility] Injection.      Luis Mendez RN  2022  2:00 PM

## 2022-09-23 ENCOUNTER — OFFICE VISIT (OUTPATIENT)
Dept: PULMONOLOGY | Age: 62
End: 2022-09-23
Payer: MEDICARE

## 2022-09-23 VITALS
DIASTOLIC BLOOD PRESSURE: 94 MMHG | SYSTOLIC BLOOD PRESSURE: 149 MMHG | RESPIRATION RATE: 18 BRPM | HEIGHT: 64 IN | WEIGHT: 260.1 LBS | HEART RATE: 77 BPM | OXYGEN SATURATION: 93 % | BODY MASS INDEX: 44.41 KG/M2 | TEMPERATURE: 98 F

## 2022-09-23 DIAGNOSIS — J96.12 CHRONIC RESPIRATORY FAILURE WITH HYPOXIA AND HYPERCAPNIA (HCC): ICD-10-CM

## 2022-09-23 DIAGNOSIS — R53.81 PHYSICAL DECONDITIONING: ICD-10-CM

## 2022-09-23 DIAGNOSIS — E66.01 SEVERE OBESITY (HCC): ICD-10-CM

## 2022-09-23 DIAGNOSIS — J44.9 ASTHMA-COPD OVERLAP SYNDROME (HCC): Primary | ICD-10-CM

## 2022-09-23 DIAGNOSIS — E66.01 MORBID OBESITY (HCC): ICD-10-CM

## 2022-09-23 DIAGNOSIS — J45.51 POORLY CONTROLLED SEVERE PERSISTENT ASTHMA WITH ACUTE EXACERBATION: ICD-10-CM

## 2022-09-23 DIAGNOSIS — J96.11 CHRONIC RESPIRATORY FAILURE WITH HYPOXIA AND HYPERCAPNIA (HCC): ICD-10-CM

## 2022-09-23 DIAGNOSIS — I27.20 PULMONARY HYPERTENSION (HCC): ICD-10-CM

## 2022-09-23 DIAGNOSIS — J44.9 COPD, GROUP D, BY GOLD 2017 CLASSIFICATION (HCC): ICD-10-CM

## 2022-09-23 PROCEDURE — G8427 DOCREV CUR MEDS BY ELIG CLIN: HCPCS | Performed by: INTERNAL MEDICINE

## 2022-09-23 PROCEDURE — 3017F COLORECTAL CA SCREEN DOC REV: CPT | Performed by: INTERNAL MEDICINE

## 2022-09-23 PROCEDURE — 99215 OFFICE O/P EST HI 40 MIN: CPT | Performed by: INTERNAL MEDICINE

## 2022-09-23 PROCEDURE — G8754 DIAS BP LESS 90: HCPCS | Performed by: INTERNAL MEDICINE

## 2022-09-23 PROCEDURE — G8432 DEP SCR NOT DOC, RNG: HCPCS | Performed by: INTERNAL MEDICINE

## 2022-09-23 PROCEDURE — G9899 SCRN MAM PERF RSLTS DOC: HCPCS | Performed by: INTERNAL MEDICINE

## 2022-09-23 PROCEDURE — G8417 CALC BMI ABV UP PARAM F/U: HCPCS | Performed by: INTERNAL MEDICINE

## 2022-09-23 PROCEDURE — G8753 SYS BP > OR = 140: HCPCS | Performed by: INTERNAL MEDICINE

## 2022-09-23 RX ORDER — FLUTICASONE PROPIONATE AND SALMETEROL XINAFOATE 230; 21 UG/1; UG/1
2 AEROSOL, METERED RESPIRATORY (INHALATION) 2 TIMES DAILY
Qty: 3 EACH | Refills: 3 | Status: SHIPPED | OUTPATIENT
Start: 2022-09-23

## 2022-09-23 RX ORDER — PREDNISONE 10 MG/1
TABLET ORAL
Qty: 30 TABLET | Refills: 0 | Status: SHIPPED | OUTPATIENT
Start: 2022-09-23 | End: 2022-10-05

## 2022-09-23 RX ORDER — BUDESONIDE 1 MG/2ML
1000 INHALANT ORAL DAILY
Qty: 90 EACH | Refills: 3 | Status: SHIPPED | OUTPATIENT
Start: 2022-09-23

## 2022-09-23 NOTE — PROGRESS NOTES
Kendrick Eubanks presents today for   Chief Complaint   Patient presents with    COPD     Follow up from 6/15/2022    Other     Pulmonary HTN, respiratory failure, COPD with asthma, history of tobacco use    Results     CXR 8/3/2022 [facility]), Echo 6/29/2022       Is someone accompanying this pt? No    Is the patient using any DME equipment during OV? Yes, NIV, O2, nebulizer, walker, wheelchair, glucometer   -[facility]    Depression Screening:  3 most recent [address] 6/15/2022   Little interest or pleasure in doing things Not at all   Feeling down, depressed, irritable, or hopeless Not at all   Total Score PHQ 2 0       Learning Assessment:  Learning Assessment 4/9/2018   PRIMARY LEARNER Patient   HIGHEST LEVEL OF EDUCATION - PRIMARY LEARNER  SOME COLLEGE   BARRIERS PRIMARY LEARNER -   PRIMARY LANGUAGE ENGLISH   LEARNER PREFERENCE PRIMARY READING   ANSWERED BY patient Ria Amezcua   RELATIONSHIP SELF       Abuse Screening:  Abuse Screening Questionnaire 6/15/2022   Do you ever feel afraid of your partner? N   Are you in a relationship with someone who physically or mentally threatens you? N   Is it safe for you to go home? Y       Fall Risk  Fall Risk Assessment, last 12 mths 6/15/2022   Able to walk? Yes   Fall in past 12 months? 0   Do you feel unsteady? 1   Are you worried about falling 1   Is the gait abnormal? 1   Number of falls in past 12 months 0   Fall with injury? -         Coordination of Care:  1. Have you been to the ER, urgent care clinic since your last visit? Hospitalized since your last visit? Yes; Where: [facility], When: 8/3-8/6/2022 & 9/21/2022-COPD exacerbation    2. Have you seen or consulted any other health care providers outside of the [address] [place] [address] since your last visit? Include any pap smears or colon screening. Yes, Dr. Susana Spaulding, PCP    Offered influenza vaccine but patient deferred to PCP.

## 2022-09-23 NOTE — LETTER
9/23/2022    Patient: Salvador Laureano   YOB: 1960   Date of Visit: 9/23/2022     [facility] [address]  [employer] [address]  Via Fax: MD [place]  [address] [place] [address]  Via Fax: [phone]    Dear MD J Carlos Gonzalez MD,      Thank you for referring Ms. Delories Sicard to [address] [place] [address] for evaluation. My notes for this consultation are attached. If you have questions, please do not hesitate to call me. I look forward to following your patient along with you.       Sincerely,    Silviano Kolb MD/MPH     Pulmonary, Critical Care Medicine  [facility]

## 2022-09-23 NOTE — PROGRESS NOTES
[address] [place] [address], [address] [place] [address] [facility]  Pulmonary, Critical Care, and Sleep Medicine    Pulmonary Office F/U  Name: Ramón Chacon 60 y.o. female  MRN: [id number]  : 1960  Service Date: 22  Chief Complaint:   Chief Complaint   Patient presents with    COPD     Follow up from 6/15/2022    Other     Pulmonary HTN, respiratory failure, COPD with asthma, history of tobacco use    Results     CXR 8/3/2022 [facility]), Echo 2022       History of Present Illness:   Ramón Chacon is a 60 y.o. female, who presents to Pulmonary clinic for F/U of asthma/COPD. Pt last seen in our clinic on 6/15/22  Pt had two exacerbations in the interval -- at end of July hospitalized to [facility], until beginning of August; second time was yesterday kept overnight   Pt on chronic 5mg daily  Pt remains on [place] on advair, spiriva, duonebs  Pt was on flovent -- taken off by PCP and placed on pulmicort  Pt compliant with Trilogy (company didn't come out to do download)  Pt saw allergist, Dr. 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   Pt on Lasix 40mg BID prescribed on Cardiology  Using nebs every 3-4 hours  mMRC of 4    Past Surgical History:   Procedure Laterality Date    HX APPENDECTOMY      HX CATARACT REMOVAL Right     HX CHOLECYSTECTOMY      HX DILATION AND CURETTAGE      Dysfunctional uterine bleeding, thought 2/2 fibroids    HX HEART CATHETERIZATION  2020    HX TUBAL LIGATION      AK BREAST SURGERY PROCEDURE UNLISTED      Right breast benign tumor removal     Family History   Problem Relation Age of Onset    Hypertension Mother     Stroke Mother     Breast Cancer Mother         Bilateral mastectomies    Cancer Mother         ovarian and breast    Heart Failure Mother Ovarian Cancer Mother     Heart Attack Father         2011    Heart Surgery Father         CABG    Heart Failure Father     COPD Sister         Heavy smoker    Cancer Sister         ovarian    Heart Failure Sister     Lung Disease Sister     Asthma Child     Cancer Maternal Aunt         pancreatic    Cancer Maternal Grandfather         stomach     Social History     Socioeconomic History    Marital status: LEGALLY      Spouse name: Not on file    Number of children: Not on file    Years of education: Not on file    Highest education level: Not on file   Occupational History    Not on file   Tobacco Use    Smoking status: Former     Packs/day: 1.00     Years: 30.00     Pack years: 30.00     Types: Cigarettes     Start date: 1966     Quit date: 2006     Years since quittin.0    Smokeless tobacco: Former    Tobacco comments:     1-1.5 packs per day   Vaping Use    Vaping Use: Never used   Substance and Sexual Activity    Alcohol use: No    Drug use: No    Sexual activity: Not Currently   Other Topics Concern    Not on file   Social History Narrative    Not on file     Social Determinants of Health     Financial Resource Strain: Not on file   Food Insecurity: Not on file   Transportation Needs: Not on file   Physical Activity: Not on file   Stress: Not on file   Social Connections: Not on file   Intimate Partner Violence: Not on file   Housing Stability: Not on file     Allergies   Allergen Reactions    Ancef [Cefazolin] Hives    Contrast Agent [Iodine] Anaphylaxis, Shortness of Breath and Swelling     Needs pre-medication for IV contrast with Benadryl, Solu-Medrol    Fish Containing Products Anaphylaxis     Pt states she had a severe allergic reaction at 9 y/o. Statins-Hmg-Coa Reductase Inhibitors Myalgia    Metformin Other (comments)     Abdominal pain, diarrhea.     Codeine Other (comments)     Altered mental status     Prior to Admission medications    Medication Sig Start Date End Date Taking? Authorizing Provider   budesonide (PULMICORT) 1 mg/2 mL nbsp Take 1 mg by inhalation daily. Yes Provider, Historical   cholecalciferol (VITAMIN D3) (2,000 UNITS /50 MCG) cap capsule Take 5,000 Units by mouth every seven (7) days. Yes Provider, Historical   potassium chloride (K-DUR, KLOR-CON M20) 20 mEq tablet Take 20 mEq by mouth daily. Yes Provider, Historical   azelastine (ASTELIN) 137 mcg (0.1 %) nasal spray 2 Sprays by Both Nostrils route as needed. 7/26/22  Yes Provider, Historical   EPINEPHrine (EPIPEN) 0.3 mg/0.3 mL injection 0.3 mg by IntraMUSCular route once as needed. 7/26/22  Yes Provider, Historical   Advair -21 mcg/actuation inhaler Inhale 2 puffs by mouth twice daily  Patient taking differently: Take 2 Puffs by inhalation two (2) times a day. 9/7/22  Yes Dario Hill MD   Spiriva Respimat 2.5 mcg/actuation inhaler INHALE 2 SPRAY(S) BY MOUTH ONCE DAILY  Patient taking differently: Take 2 Puffs by inhalation daily. 9/7/22  Yes Dario Hill MD   azithromycin (ZITHROMAX) 250 mg tablet TAKE 1 TABLE[facility] AND FRIDAY 6/28/22  Yes Dario Hill MD   predniSONE (DELTASONE) 10 mg tablet Take 5 mg by mouth daily. 6/13/22  Yes Provider, Historical   insulin lispro (HUMALOG) 100 unit/mL kwikpen 5-12 Units. 7/28/21  Yes Provider, Historical   ipratropium (ATROVENT) 0.02 % soln 0.5 mg by Nebulization route every four (4) hours as needed. 3/29/22  Yes Provider, Historical   polyethylene glycol (MIRALAX) 17 gram/dose powder Take 17 g by mouth as needed. Yes Provider, Historical   furosemide (LASIX) 40 mg tablet Take 1 tablet by mouth twice daily  Patient taking differently: Take 40 mg by mouth two (2) times a day. 10/1/21  Yes Chaya Ma MD   montelukast (Singulair) 10 mg tablet Take 1 Tablet by mouth nightly. 8/31/21  Yes Dario Hill MD   fluticasone propionate (FLONASE) 50 mcg/actuation nasal spray 2 Sprays by Both Nostrils route daily.  8/31/21  Yes Radha Rodrigues MD   simethicone (GAS-X) 125 mg capsule Take 125 mg by mouth four (4) times daily as needed for Flatulence. Yes Provider, Historical   insulin glargine (LANTUS,BASAGLAR) 100 unit/mL (3 mL) inpn 65 Units by SubCUTAneous route nightly. And 5 units in the morning  Indications: type 2 diabetes mellitus   Yes Provider, Historical   glucose blood VI test strips (FreeStyle Lite Strips) strip Use four times daily for blood sugar checks. 6/16/17  Yes Provider, Historical   Blood-Glucose Meter monitoring kit CHECK BLOOD SUGARS B.I.D. E11.9 6/16/17  Yes Provider, Historical   lancets 28 gauge misc Use four times daily for blood sugar checks 6/16/17  Yes Provider, Historical   Insulin Needles, Disposable, 31 gauge x 3/16\" ndle Use with Basaglar once daily. 4/4/18  Yes Provider, Historical   Insulin Syringe-Needle U-100 0.3 mL 31 gauge x 5/16\" syrg USE AS DIRECTED 2/14/17  Yes Provider, Historical   acetaminophen (TYLENOL) 500 mg tablet Take 500 mg by mouth every six (6) hours as needed for Fever or Pain. Yes Provider, Historical   albuterol (PROVENTIL HFA, VENTOLIN HFA, PROAIR HFA) 90 mcg/actuation inhaler Take 2 Puffs by inhalation every four (4) hours as needed for Wheezing. 7/7/20  Yes Evonne Kate PA-C   omeprazole (PRILOSEC) 40 mg capsule Take 40 mg by mouth daily. 3/9/18  Yes Provider, Historical   lisinopril (PRINIVIL, ZESTRIL) 20 mg tablet Take 10 mg by mouth daily. Yes Provider, Historical   OXYGEN-AIR DELIVERY SYSTEMS 2 L by Nasal route continuous. First Choice between 2L and 3L   Yes Provider, Historical   aspirin delayed-release 81 mg tablet Take 81 mg by mouth daily. Yes Provider, Historical   fluticasone propionate (FLOVENT HFA) 220 mcg/actuation inhaler Take 2 Puffs by inhalation daily. Patient not taking: Reported on 9/23/2022 9/23/22 9/23/22  Provider, Historical   metoclopramide HCl (REGLAN) 5 mg tablet Take 5 mg by mouth Before breakfast, lunch, and dinner.   Patient not taking: Reported on 9/23/2022 9/23/22 9/23/22  Provider, Historical   Prolia 60 mg/mL injection INJECT 60MG SUBCUTANEOUSLY AS A SINGLE DOSE ONCE EVERY 6 MONTHS  Patient not taking: No sig reported 11/23/20 9/23/22  Provider, Historical   albuterol-ipratropium (DUO-NEB) 2.5 mg-0.5 mg/3 ml nebu 3 mL by Nebulization route every six (6) hours as needed for Wheezing. Patient not taking: Reported on 9/23/2022 9/23/22 9/23/22  Provider, Historical   NOVOLOG FLEXPEN U-100 INSULIN 100 unit/mL inpn Continue home Sliding scale insulin as prior to admission  Patient not taking: No sig reported 7/10/18   Lazarus Dew, MD     Immunization History   Administered Date(s) Administered    Influenza Vaccine 09/21/2009, 10/21/2011, 12/21/2012, 01/07/2014, 10/20/2014, 10/01/2015, 10/07/2016, 12/31/2019, 11/18/2020, 11/01/2021    Influenza, FLUARIX, FLULAVAL, FLUZONE (age 10 mo+) AND AFLURIA, (age 1 y+), PF, 0.5mL 01/26/2018, 10/11/2018    Novel Influenza-H1N1-09, All Formulations 01/29/2010    Pneumococcal Conjugate (PCV-13) 12/31/2019    Pneumococcal Polysaccharide (PPSV-23) 10/11/2018    Pneumococcal Vaccine (Unspecified Type) 08/01/2022       Review of Systems:  A complete review of systems was performed as stated in the HPI, all others are negative.       Objective:    Physical Exam:  BP (!) 150/61 (BP 1 Location: Left upper arm, BP Patient Position: Sitting, BP Cuff Size: Large adult)   Pulse 77   Temp 98 °F (36.7 °C) (Temporal)   Resp 18   Ht 5' 4\" (1.626 m)   Wt 118 kg (260 lb 1.6 oz)   SpO2 93% Comment: 2 LPM O2 (pulse)  BMI 44.65 kg/m²   Vitals were personally reviewed  Gen: no acute distress, pleasant and cooperative, sitting up in wheelchair, wearing supplemental oxygen, obese  HEENT: normocephalic/atraumatic, PERRLA, EOMI, no scleral icterus, nasal bridge midline, unable to assess nasal and oral cavities due to patient wearing mask in the setting of COVID-19 pandemic  Neck: supple, trachea midline, no JVD  CVS: regular rate rhythm, S1/S2, no murmurs/rubs/gallops  Lungs: poor air entry B/L, distant breath sounds B/L, CTABL, no wheezes/rales/rhonchi  Psych: normal memory, thought content, and processing    Labs: I have reviewed the patient's available labs  Lab Results   Component Value Date/Time    WBC 7.6 02/07/2022 03:45 PM    HGB 13.0 02/07/2022 03:45 PM    HCT 38.8 02/07/2022 03:45 PM    PLATELET 758 22/51/3838 03:45 PM    MCV 85.7 02/07/2022 03:45 PM     Lab Results   Component Value Date/Time    Sodium 138 02/07/2022 03:45 PM    Potassium 3.9 02/07/2022 03:45 PM    Chloride 100 02/07/2022 03:45 PM    CO2 32 02/07/2022 03:45 PM    Anion gap 6 02/07/2022 03:45 PM    Glucose 208 (H) 02/07/2022 03:45 PM    BUN 12 02/07/2022 03:45 PM    Creatinine 0.90 02/07/2022 03:45 PM    BUN/Creatinine ratio 13 02/07/2022 03:45 PM    GFR est AA >60 02/07/2022 03:45 PM    GFR est non-AA >60 02/07/2022 03:45 PM    Calcium 10.3 (H) 02/07/2022 03:45 PM    Bilirubin, total 0.4 07/01/2021 04:50 AM    Alk. phosphatase 86 07/01/2021 04:50 AM    Protein, total 7.3 07/01/2021 04:50 AM    Albumin 3.5 07/01/2021 04:50 AM    Globulin 3.8 07/01/2021 04:50 AM    A-G Ratio 0.9 07/01/2021 04:50 AM    ALT (SGPT) 32 07/01/2021 04:50 AM    AST (SGOT) 14 07/01/2021 04:50 AM     Imaging:  I have personally reviewed patient's imaging as follows: No new imaging in the interval.  **LDCT from 11/3/2021 shows scattered centrilobular emphysema worse in bilateral upper lobes along with some linear atelectasis, no nodules, masses, consolidations, infiltrates, effusions seen. Official report per radiology:  CT Results (most recent):  Results from Hospital Encounter encounter on 11/03/21    CT LOW DOSE LUNG CANCER SCREENING    Narrative  CT of the chest for lung cancer screening    HISTORY: Lung screening. COPD, tobacco use history, group D. Meet lung  screening criteria.     COMPARISON: 11/2/20    TECHNIQUE: Low dose unenhanced multislice helical CT was performed from the  thoracic inlet to the adrenal glands without intravenous contrast  administration. Contiguous [1.25 mm] axial images were reconstructed and lung  and soft tissue windows were generated. Coronal MIP and sagittal reformations  were generated.  -All CT scans at this facility performed using dose optimization techniques as  appreciated to a performed exam, to include automated exposure control,  adjustment of the mA and or KU according to patient size (including appropriate  matching for site specific examination), or use of iterative reconstruction  technique. FINDINGS:    The 3 mm nodule at lateral right upper lobe measures 2 mm today, #97/3. Mild  emphysema and chronic bronchitis appear stable. The lungs are clear of new  nodule, mass, airspace disease or edema. There is no pleural effusion. The absence of intravenous contrast reduces the sensitivity for evaluation of  the mediastinum and upper abdominal organs. No mediastinal or hilar adenopathy  appreciated. The heart is not enlarged. No pericardial effusion. Mild coronary  artery calcifications present. The aorta has a normal contour with no evidence  of aneurysm. The thyroid appears unremarkable. The bones and soft tissues of  the chest wall are within normal limits. The visualized portions of the upper  abdominal organs are normal.    Impression  1. The tiny right upper lobe nodule seems to be smaller, probably due to tiny  size and technical difference. No new nodule seen. 2. Moderate emphysema and bronchitis. LUNG RADS ASSESSMENT CATEGORIES:    Lung RADS 2 - Benign appearance or behavior. Management:  Continue annual screening with low dose CT in 12 months. Thank you for your referral.      PFTs:  I have reviewed the patient's PFTs -- no new studies    TTE:  I have reviewed the patient's TTE results    05/21/20   ECHO ADULT COMPLETE 05/25/2020 5/25/2020    Narrative · Image quality for this study was technically difficult.  Contrast used: DEFINITY. · Normal cavity size and systolic function (ejection fraction normal). Moderate concentric hypertrophy. Estimated left ventricular ejection   fraction is 65 - 70%. Visually measured ejection fraction. No regional   wall motion abnormality noted. Moderate (grade 2) left ventricular   diastolic dysfunction. · Mildly dilated left atrium. · Pulmonary hypertension. Pulmonary arterial systolic pressure is 61 mmHg. · Severely elevated central venous pressure (15+ mmHg); IVC diameter is   larger than 21 mm and collapses less than 50% with respiration. Signed by: Eveline Elaine MD   Cardiac catheter result reviewed from 12/21/2020 from Dr. Jillian Pulliam, reports that patient has elevated LVEDP (27) consistent with fluid overload/CHF, normal LV function, no wall motion abnormalities, moderate two-vessel coronary artery disease to treat medically. 06/28/21    ECHO ADULT FOLLOW-UP OR LIMITED 06/29/2021 6/29/2021    Interpretation Summary  · LV: Estimated LVEF is 55 - 60%. Visually measured ejection fraction. Normal cavity size and systolic function (ejection fraction normal). Mild concentric hypertrophy. · PA: Moderate pulmonary hypertension. Pulmonary arterial systolic pressure is 51 mmHg. · Image quality for this study was technically difficult. · Contrast used: DEFINITY. Signed by: Leopold Roup, MD on 6/29/2021  2:58 PM            NIPPV/trilogy compliance report reviewed in clinic from May to Sept  Rest of download was not present since it was not downloaded by [facility]      Assessment and Plan:  60 y.o. female with:    Impression:  1. COPD/asthma overlap syndrome:  Pt on dual ICS/LABA/LTRA/LAMA + anti-IL5R + chronic prednisone (5mg) therapy.   Pt had another 2 exacerbations requiring hospitalization in July and Sept to [facility], now on prednisone burst.  2.  Underlying poorly controlled severe persistent asthma with steroid dependence: Patient has a strong asthma component with significant bronchospasm to a wide variety of classic environmental triggers, was on SCIT therapy in the past.  Despite chronic steroids, patient's absolute eosinophil count was 100 on 6/3/2020 and 200 on 3/24/2020. Patient on benralizumab injections, and notes significant improvement. Pt on dual ICS/LABA/LAMA/LTRA/anti-IL5R therapy  3. COPD, gold risk category D  4. Chronic hypoxic and hypercapnic respiratory failure: On 3-5L by POC  5. Severe obesity: Body mass index is 44.65 kg/m². 6.  Obesity hypoventilation syndrome with SHERRIE: Patient on trilogy in the AVAPS-AE setting with good compliance  7. Dyspnea/shortness of breath: Multifactorial, due to above as well as CHFpEF  8. Chronic rhinitis  9. Chronic steroid dependence: Noted above  10. CHFpEF with previous exacerbations: Seen on transthoracic echo, grade 2 diastolic dysfunction and moderate concentric hypertrophy with dilated LA and severe pulmonary hypertension  11. Pulm HTN: Secondary to WHO group 2 and group 3 disease. Elevated LVEDP on cardiac cath from 12/21/20. Pt euvolemic currently on Lasix 40mg BID  12. History of tobacco use: Quit smoking in 2006  13. Osteoporosis: Seen on most recent DEXA scan from 11/2/2020. Secondary to chronic steroid use. Pt off of Prolia due to insurance issues  14. Glaucoma and cataracts: Follows with ophthalmology, exacerbated by chronic steroid use    Plan:  -Complete prednisone burst.  Given additional taper for emergency use to prvent additional ER visit --- advised pt to check sugars frequently and adjust insulin - contact PCP if BS run high/low while on therapy for adjustments. Once burst complete, continue chronic 5mg prednisone therapy -- at this point benefits outweigh risks given repeated exacerbations and severity of sx.   Counseled regarding long-term side effects of corticosteroid therapy including osteoporosis with possible pathologic fracture, glaucoma, cataracts, stress ulcer, elevated blood sugars/worsening diabetes and its sequelae  -Management of immunology issues per A&I per Dr. Blue Roy. Appreciate input  -Follow-up with cardiology with regards to CHF. CHF lifestyle precautions including fluid restriction, daily weights, low-sodium diet, etc.  -No further LDCT, pt completed 15 years of surveillance post smoking cessation  -Management of osteoporosis per PCP -- pt has upcoming DEXA scan. Advised to discuss Prolia with her insurance since DEXA was less than 2y old  -Continue chronic azithromycin therapy 250 mg p.o. every Monday/Wednesday/Friday  -Continue dual ICS/LABA/LAMA/LTRA/anti-IL5R therapy   Given reduction in exacerbations and pt noting improvement while on therapy, will continue Fasenra therapy SQ [id number]   Continue Advair /21 MCG 2 puffs twice daily. Counseled rinse mouth thoroughly after use   Continue pulmicort nebs 1mg daily. Counseled to rinse mouth thoroughly after each use   Continue Spiriva Respimat 2 puffs daily   Continue Singulair 10 mg nightly   Continue DuoNebs as needed   Continue Albuterol HFA 1-2puffs q4-6h PRN. Counseled patient that this is their rescue inhaler. Also counseled patient regarding premedication 15-30m prior to exercise or exposure to very cold air  -Counseled patient on proper inhaler technique  -Counseled patient to avoid potential triggers of asthma  -Follow-up with ophthalmology as scheduled.  -Continue loratadine daily  -Continue trilogy nightly and PRN during daytime, congratulated patient on good compliance. Counseled patient to change mask/tubing/filters every 3 to 6 months. Counseled patient against sleepy driving.   Will have DME company perform download in the next few weeks  -Continue supplemental oxygen, 2-3L pulse dosing at all times and qhs  -Weight loss  -Refer to pulmonary rehab  -Advised patient to followup with pulmonary rehab to begin graded exercise  -Immunizations reviewed, influenza and pneumococcal vaccinations up-to-date  --Of note, I also spent a significant portion of this visit discussing advance care planning, specifically discussing MPOA paperwork since patient is only  and not legally  has multiple children. Reviewed [place] next of kin law with patient and the necessity for MPOA and advised to file paperwork at [facility] and [facility]. Also discussed DNR and DNI with the patient, reviewing likely outcome being poor and patient not returning to current quality of life which is already limited. Follow-up and Dispositions    Return in about 3 months (around 12/23/2022). Orders Placed This Encounter    REFERRAL TO PULMONARY REHAB [facility] or [facility]    predniSONE (DELTASONE) 10 mg tablet    fluticasone propion-salmeteroL (Advair HFA) 230-21 mcg/actuation inhaler    budesonide (PULMICORT) 1 mg/2 mL nbsp       This patient has a high complexity chronic care condition   This Visit needed High complexity medically necessary decision making and management plans. Time spent in preparing for the visit-review of history, tests done prior to arrival, additional time reviewing clinical data, imaging, outside records and test results as well as time spent in ordering tests, treatments and referring patient for further care was a total of 27 minutes.   Additional time-counseling with patient and family members regarding care plan 48 minutes --> 25minutes were spent in discussion of code status DNR/DNI and MPOA paperwork  Total aggregate time spent in this patient encounter was 75minutes      Elizabeth Ashley MD/MPH     Pulmonary, [facility] [address] [facility]

## 2022-10-10 ENCOUNTER — APPOINTMENT (OUTPATIENT)
Dept: PULMONOLOGY | Age: 62
End: 2022-10-10

## 2022-10-27 ENCOUNTER — HOSPITAL ENCOUNTER (OUTPATIENT)
Dept: PULMONOLOGY | Age: 62
Discharge: HOME | End: 2022-10-27

## 2022-10-27 NOTE — PROGRESS NOTES
Summary Report     Irl Duty  Pulmonary Tx Plan  Description: Female : 1960     Initial Pulmonary ITP    Pt arrived today for initial evaluation to start Pulmonary Rehab at [facility]. Her blood pressure was 190/92, taken manually at rest.  Due to this we were unable to perform her 6MWT. Upon discussion with pt, it was discovered that she had not taken her medication for hypertension this morning. Pt has appointment with her PCP on 10/31/2022, and is going to address this with him at that time. I will reach out to her referring physician. Pt was scheduled to start pulmonary rehab sessions, and to perform her 6MWT at the first appointment if her blood pressure was controlled. Pt instructed to take her hypertension medcations prior to arrival at [facility] going forward.          Flowsheet Row CARDIO PULMONARY REHAB from 10/27/2022 in THE [facility] PULMONARY REHAB   Treatment Diagnosis    Referral Date 22   Significant Cardiovascular History History of heart failure, Previous PCI   Co-morbidities Diabetes, Pulmonary disease   Oxygen Saturation / Titration     Stages of change  Preparation   OXYGEN INTERVENTION    Oxygen Use Yes   Oxygen Type  Nasal Canula   Patients Liter Flow  2-3   Nurse / Patient Discussion  Yes   Oxygen Education  Oxygen Safety / Audrene Greer with Oxygen, Types of Oxygen, Proper care of Oxygen Equipment, Emergency Oxygen useage   Oxygen Target Goals  Understand use of Oxygen, Discontinue Oxygen useage, Decrease Liters required, Use Oxygen as needed, Keep SA02 greater than 90%   Individual Treatment Plan    ITP Visit Type Initial Assessment   1st Date of Exercise 22   ITP Next Review Date 22   Visit #/Total Visits    EF % 60 %   FVC  77 liters   FEV1% Predicted 51 %   FEV1/FVC 66   DLCO 69 ml/mmHg sec   Risk Stratification High   Pulmonary ITP Exercise, Psychosocial, Nutrition, Education   Exercise     Total Score 4   Stages of Change Preparation   Test Six minute walk test   BMI (calculated) --   Exercise Prescription    Mode Bike, Stepper, Ergometer   Frequency per week 2   Duration per session 60   Progression slow   O2 Sat (%) --   O2 Device --   O2 Flow Rate (L/min) 3 l/min   Comments 3L with exertion   Symptoms with Exercise Shortness of breath, Leg pain   Target Heart Rate 112-128   Resistance Training Yes   Assisted Devices Walker   Exercise Blood Pressures    Resting /92   Is BP WDL?  No   Exercise Intervention    Type None   Exercise Education    Education Self pulse, Exercise safety, Signs/Symptoms to report, Low sodium diet, RPE scale, Blood pressure medications, Equipment orientation, Understand blood pressure, Warm up/Cool down, Home exercise plan, Energy conservation, O2 therapy, RPE/RPD scale, Purse lip/Abdominal breathing   Exercise Target Goal    Target Goal(s) Individual exercise RX, BP < 140/90 or < 130/80, if DM or CKD, Aerobic activity 30 + minutes/day 5 days/week, SA02>89%, Home activity   Psychosocial    Stages of Change Preparation   Psychosocial Intervention    Interventions PCP notified   Consults PCP   Currently Taking Psychotropic Meds No   Psychosocial Education    Education Advanced directives, Benefits of CPR completion, Cardiac meds, Coping techniques, Environmental triggers, Impact self care behaviors on health, Relaxation techniques, Signs/Symptoms of depression, Stress management, Support groups, Traveling with lung disease   Psychosocial Target Goals    Target Goal(s) Demonstrates appropriate interaction with others, Engages in self-care behaviors, Maximizes coping skills, Positive support group, Assess presence or absence of depression using a valid screening tool   Uses Stress Mgmt Techniques Yes   Tobacco    Stages of Change Maintenance   Tobacco Use Yes   Quit Greater than 6 month  [Quit 2007]   Tobacco Cessation Intervention    Tobacco Education    Target Goal    Nutrition    Stages of Change Maintenance   Diabetes Yes      FBS Date 07/01/21   HbA1C 7.8   HbA1C Date 08/04/22   BG at Home Yes   BG Frequency 4/day   Diabetes Med(s) Lantus, Humalog   BG in Range?  No   Lipids    Date Lipids Drawn 09/10/21   Total 252   HDL 44      Triglycerides 297   Weight Management    Weight  119.7 kg (264 lb)   Height  5' 4\" (1.626 m)   BMI 45.41   Weight Goal 250   Waist Circumference  51   Alcohol None   Rate Your Plate Total Score 50   Nutrition Intervention    Dietitian Consult No   Nurse/Patient Discussion Yes   Nutrition Education    Education Signs/Symptoms Hypo/Hyperglycemia, DM & CAD relationship, Low fat & cholesterol diet, Carb-controlled diet, Low sodium diet, High fiber diet, Healthy eating, Nutrition and lung disease, Supplemental dietary needs   Nutrition Target Goals    Target Goals LDL-C less than 70 for high risk, LDL-C less than 100, Non HDL-C less than 130, LDL-C 70 high risk, HbA1C less than 7 percent, BMI less than 25, Waist size less than 40 inches males and less than 35 inches for females, Weight loss of 5-10%   Education    Learning Barrier Ready to learn   Education Intervention    Patient Education     Education CAD, Risk factors, Med Compliance, Cardiac A&P, Signs/Symptoms of Angina , Pulmonary Medications, Breaking the Dyspnea Cycle, Pulmonary A&P, lung/gas exchange, Heart/Lung association, Activity of Daily Living, Nebulizer Use, Preventing Infection, Bronchial Hygiene/controlled cough, Signs/Symptoms Hypo/hyperglycemia, DM & lung disease   Education Target Goals    Target Goals Correct demonstration of breathing techniques, Correct demonstration of MDI use and care, Correct demonstration/verbalization of 02 therapy   Physician Response            MD Signature: _______________________________________________        Date/Time:  _______________________________

## 2022-10-27 NOTE — PROGRESS NOTES
Deisi Garcia 60y.o. year old female with COPD J44.9        Social  History & Family Component    Living Situation:      LIVES:  Other (With daughter)      Does patient have family/friends able to provide support  [x]  Yes  []  No     What type of home does patient live in? [x] one level   []   one level with basement      []   2 story    # of stairs to enter home:   6  # of stairs in home:     []  other:    Does patient have a yard? []  No   [x] Yes    If yes, can patient care for yard? []  Yes [x]  No   If no, does patient require assistance to care for yard? [x]  Yes   []  No (Grandson does yard)    Transportation Resources:  Can patient drive themselves? [x]  Yes   []   No (Sometimes, cannot drive if her breathing is bad)   If no, who is principle ? One of her children drives her    Does patient need referral for community resources (i.e., Taxi, [place], etc.)      [x]  No  []  Yes, which option?  Services/[place]/Visual Impaired: [x]  Yes   []   No   If yes, describe: Wears Glasses    Occupation: CNA, , pharmacy tech    Education/Grade Level: Some College    Occupational exposures: Chemicals, Asbestos, [employer],      correction/disability date: 2012    Psychosocial Assessment Tools    COPD Assessment Test (CAT) Score: 33  Date of Test: 10/27/2022  Impact Level: Very High    CAT measures COPD health status and quantifies the impact of COPD on patients life  and how this changes over time  Score and Impact Level: >30 = very high; > 20 = high; 10 - 20 = medium; <10 = low    PHQ-9 Score: 7  Date of Test: 10/27/2022    Scale includes 9 depression questions including 1 suicidal ideation and a 10th question asking about work, home, and socialization  Each item on the questionnaire is scored from 0-3 and this means that a person can score between 0 and 27 for depression.    Results5-9 minimal symptoms, 10-14 minor depression, 15-19 major depression, moderately severe, >20 major depression, severe. Assessment:    Limited participation in family and community activities [] No [x] Yes      Limited coping strategies [] No [x] Yes      Inappropriate over dependence on family and friends [x] No [] Yes      Stressors [] No [x] Yes  If yes, please describe: Hospitalizations,     Able to relax or perform leisure activities [] No [x] Yes   Describe leisure activities: Reading, Zada Rhyme to participate in former interests/hobbies: [] No [x] Yes   Describe hobbies: Music, Dance,     Able to do yard/household activities: [] No [x] Yes   Describe activities: Dishes,   What is most difficult household chore?: Caye Sheen  What is easiest household chore?:Dishes,    Current mood assessed: [] No [x] Yes   Description of present mood: []  Worried  []   Sad []    []  Depressed []   Impatient  []   Frustrated  []   Anxious  []   Contented  [x]   Cheerful  [x]   Happy []   Other:    Needs referral to referring MD for follow up: [] No [] Yes       Aspects of family & home situation that may impact progress in Pulmonary Rehabilitation:      Barrier(s) to learning:  None     []  Family & home situation will enhance Pulmonary Rehabilitation, patient is a good candidate     []  Family & home situation will hinder Pulmonary Rehabilitation but patient will be accepted into the program.     ______________________________________________________________________        Patient name: Akanksha Jaramillo : 1960         Goals Comments   1. Pt would like to be able to get out and do leisure activities within the first 60-90 days of starting MS   [x] initial  [] met                  [] not met  [] progressing     2. Pt would like be able to attend events for her grandchildren and not be scared due to CHAUDHARY within 30-60 days of starting MS   [x] initial  [] met                  [] not met  [] progressing    3.  To be able to walk through her home without sitting down to rest every 5-6 steps within 30-60 days of starting KS   [x] initial  [] met                  [] not met  [] progressing    4.  Pt would like to be able to go fishing with her sister by the time she completes KS    [x] initial  [] met                  [] not met  [] progressing        Frequency / Duration: Patient to be seen 2-3 times per week for 12-18 weeks:          Cici Gar, RT 10/27/2022 9:53 AM

## 2022-11-02 ENCOUNTER — TELEPHONE (OUTPATIENT)
Dept: PULMONOLOGY | Age: 62
End: 2022-11-02

## 2022-11-02 NOTE — TELEPHONE ENCOUNTER
Patient states she started Monday with sob, cough, and congestion with thick green sputum. She saw PCP Monday and he gave Steroids x 5 days and Zithromax x 5 days. Today she is not any better and is on her way to [facility] ER.

## 2022-11-02 NOTE — TELEPHONE ENCOUNTER
Pt stated that she experience more SOB unable to sleep, exposed rsv, build of phelgm please call [phone]

## 2022-11-04 ENCOUNTER — APPOINTMENT (OUTPATIENT)
Dept: PULMONOLOGY | Age: 62
End: 2022-11-04

## 2022-11-07 ENCOUNTER — APPOINTMENT (OUTPATIENT)
Dept: PULMONOLOGY | Age: 62
End: 2022-11-07

## 2022-11-11 ENCOUNTER — APPOINTMENT (OUTPATIENT)
Dept: PULMONOLOGY | Age: 62
End: 2022-11-11

## 2022-11-11 ENCOUNTER — HOSPITAL ENCOUNTER (OUTPATIENT)
Dept: INFUSION THERAPY | Age: 62
End: 2022-11-11
Payer: MEDICARE

## 2022-11-14 ENCOUNTER — APPOINTMENT (OUTPATIENT)
Dept: PULMONOLOGY | Age: 62
End: 2022-11-14

## 2022-11-15 ENCOUNTER — HOSPITAL ENCOUNTER (OUTPATIENT)
Dept: INFUSION THERAPY | Age: 62
Discharge: HOME | End: 2022-11-15
Payer: MEDICARE

## 2022-11-15 VITALS
DIASTOLIC BLOOD PRESSURE: 78 MMHG | RESPIRATION RATE: 18 BRPM | SYSTOLIC BLOOD PRESSURE: 167 MMHG | HEART RATE: 81 BPM | TEMPERATURE: 97.5 F | OXYGEN SATURATION: 93 %

## 2022-11-15 DIAGNOSIS — J45.51 SEVERE PERSISTENT ASTHMA WITH EXACERBATION: Primary | ICD-10-CM

## 2022-11-15 PROCEDURE — 96372 THER/PROPH/DIAG INJ SC/IM: CPT

## 2022-11-15 PROCEDURE — 74011250636 HC RX REV CODE- 250/636: Performed by: INTERNAL MEDICINE

## 2022-11-15 RX ADMIN — BENRALIZUMAB 30 MG: 30 INJECTION, SOLUTION SUBCUTANEOUS at 13:33

## 2022-11-15 NOTE — PROGRESS NOTES
[facility] Progress Note    Date: November 15, 2022    Name: Lima Maier    MRN: [id number]         : 1960    Ms. Jasbir Chen arrived in the [facility] today at 1325, in stable condition, here for her FASENRA Injection (Every 8 Weeks). She was assessed and education was provided. Ms. Bing Beckman vitals were reviewed. Visit Vitals  BP (!) 167/78 (BP 1 Location: Left upper arm, BP Patient Position: Sitting)   Pulse 81   Temp 97.5 °F (36.4 °C)   Resp 18   SpO2 93%   Breastfeeding No     Benralizumab (FASENRA) 30 mg, was administered SQ, in the back of her left arm, per order and without incident. Ms. Jasbir Chen tolerated well and voiced no complaints. Ms. Jasbir Chen was discharged from [place] [address] in stable condition at 1335. She is to return on 23 at 1330 for her next [facility] Injection.      Beto Umana RN  November 15, 2022

## 2022-11-18 ENCOUNTER — APPOINTMENT (OUTPATIENT)
Dept: PULMONOLOGY | Age: 62
End: 2022-11-18

## 2022-11-21 ENCOUNTER — APPOINTMENT (OUTPATIENT)
Dept: PULMONOLOGY | Age: 62
End: 2022-11-21

## 2022-11-25 ENCOUNTER — APPOINTMENT (OUTPATIENT)
Dept: PULMONOLOGY | Age: 62
End: 2022-11-25

## 2022-11-28 ENCOUNTER — APPOINTMENT (OUTPATIENT)
Dept: PULMONOLOGY | Age: 62
End: 2022-11-28

## 2022-11-30 RX ORDER — MONTELUKAST SODIUM 10 MG/1
10 TABLET ORAL
Qty: 90 TABLET | Refills: 0 | Status: SHIPPED | OUTPATIENT
Start: 2022-11-30

## 2022-11-30 NOTE — PROGRESS NOTES
Pulmonary Rehab Non-Routine Discharge Psychosocial Note     Program Completed: JORGE     Deisi Garcia 60 y.o. has completed 1/36 sessions of Pulmonary Rehab. She did not return after her initial evaluation. At that appointment she wasnot stable enogh to perform her 6  minute walk due to elevated blood pressure. Pt was advised to see her PCP prior to returning to [place]. Pt did end up admitted to hospital, and was in contact with this clinic after her discharge form hospital.  Pt never did return to have 6MWT or initial intake evaluation completed. She is being discharged from Pulmonary Rehab because she has not been seen in this clinic for over 30 days, and is no longer returning phone calls.

## 2022-12-02 ENCOUNTER — APPOINTMENT (OUTPATIENT)
Dept: PULMONOLOGY | Age: 62
End: 2022-12-02

## 2022-12-05 ENCOUNTER — APPOINTMENT (OUTPATIENT)
Dept: PULMONOLOGY | Age: 62
End: 2022-12-05

## 2022-12-09 ENCOUNTER — APPOINTMENT (OUTPATIENT)
Dept: PULMONOLOGY | Age: 62
End: 2022-12-09

## 2022-12-10 ENCOUNTER — HOSPITAL ENCOUNTER (INPATIENT)
Age: 62
LOS: 3 days | Discharge: HOME | DRG: 178 | End: 2022-12-13
Attending: EMERGENCY MEDICINE | Admitting: FAMILY MEDICINE
Payer: MEDICARE

## 2022-12-10 ENCOUNTER — APPOINTMENT (OUTPATIENT)
Dept: GENERAL RADIOLOGY | Age: 62
DRG: 178 | End: 2022-12-10
Attending: EMERGENCY MEDICINE
Payer: MEDICARE

## 2022-12-10 DIAGNOSIS — J96.00 ACUTE RESPIRATORY FAILURE DUE TO COVID-19 (HCC): Primary | ICD-10-CM

## 2022-12-10 DIAGNOSIS — U07.1 ACUTE RESPIRATORY FAILURE DUE TO COVID-19 (HCC): Primary | ICD-10-CM

## 2022-12-10 PROBLEM — J96.01 ACUTE HYPOXEMIC RESPIRATORY FAILURE DUE TO COVID-19 (HCC): Status: ACTIVE | Noted: 2022-12-10

## 2022-12-10 LAB
ALBUMIN SERPL-MCNC: 3.7 G/DL (ref 3.4–5)
ALBUMIN/GLOB SERPL: 1 {RATIO} (ref 0.8–1.7)
ALP SERPL-CCNC: 98 U/L (ref 45–117)
ALT SERPL-CCNC: 39 U/L (ref 13–56)
ANION GAP SERPL CALC-SCNC: 9 MMOL/L (ref 3–18)
ARTERIAL PATENCY WRIST A: POSITIVE
AST SERPL-CCNC: 20 U/L (ref 10–38)
BASE EXCESS BLD CALC-SCNC: 3.9 MMOL/L
BASOPHILS # BLD: 0 K/UL (ref 0–0.1)
BASOPHILS NFR BLD: 0 % (ref 0–2)
BDY SITE: ABNORMAL
BILIRUB SERPL-MCNC: 0.7 MG/DL (ref 0.2–1)
BNP SERPL-MCNC: 110 PG/ML (ref 0–900)
BODY TEMPERATURE: 97.9
BUN SERPL-MCNC: 15 MG/DL (ref 7–18)
BUN/CREAT SERPL: 16 (ref 12–20)
CALCIUM SERPL-MCNC: 9.1 MG/DL (ref 8.5–10.1)
CHLORIDE SERPL-SCNC: 102 MMOL/L (ref 100–111)
CO2 SERPL-SCNC: 30 MMOL/L (ref 21–32)
CREAT SERPL-MCNC: 0.92 MG/DL (ref 0.6–1.3)
D DIMER PPP FEU-MCNC: 0.48 UG/ML(FEU)
DIFFERENTIAL METHOD BLD: ABNORMAL
EOSINOPHIL # BLD: 0 K/UL (ref 0–0.4)
EOSINOPHIL NFR BLD: 0 % (ref 0–5)
ERYTHROCYTE [DISTWIDTH] IN BLOOD BY AUTOMATED COUNT: 14.2 % (ref 11.6–14.5)
FLUAV RNA SPEC QL NAA+PROBE: NOT DETECTED
FLUBV RNA SPEC QL NAA+PROBE: NOT DETECTED
GAS FLOW.O2 O2 DELIVERY SYS: ABNORMAL L/MIN
GLOBULIN SER CALC-MCNC: 3.8 G/DL (ref 2–4)
GLUCOSE BLD STRIP.AUTO-MCNC: 261 MG/DL (ref 70–110)
GLUCOSE SERPL-MCNC: 166 MG/DL (ref 74–99)
HCO3 BLD-SCNC: 29 MMOL/L (ref 22–26)
HCT VFR BLD AUTO: 36.9 % (ref 35–45)
HGB BLD-MCNC: 12.2 G/DL (ref 12–16)
IMM GRANULOCYTES # BLD AUTO: 0.1 K/UL (ref 0–0.04)
IMM GRANULOCYTES NFR BLD AUTO: 1 % (ref 0–0.5)
LACTATE BLD-SCNC: 1.57 MMOL/L (ref 0.4–2)
LYMPHOCYTES # BLD: 1.2 K/UL (ref 0.9–3.6)
LYMPHOCYTES NFR BLD: 21 % (ref 21–52)
MCH RBC QN AUTO: 28.8 PG (ref 24–34)
MCHC RBC AUTO-ENTMCNC: 33.1 G/DL (ref 31–37)
MCV RBC AUTO: 87 FL (ref 78–100)
MONOCYTES # BLD: 0.7 K/UL (ref 0.05–1.2)
MONOCYTES NFR BLD: 12 % (ref 3–10)
NEUTS SEG # BLD: 3.9 K/UL (ref 1.8–8)
NEUTS SEG NFR BLD: 66 % (ref 40–73)
NRBC # BLD: 0 K/UL (ref 0–0.01)
NRBC BLD-RTO: 0 PER 100 WBC
O2/TOTAL GAS SETTING VFR VENT: 40 %
PCO2 BLD: 43.5 MMHG (ref 35–45)
PH BLD: 7.43 [PH] (ref 7.35–7.45)
PLATELET # BLD AUTO: 293 K/UL (ref 135–420)
PMV BLD AUTO: 8.5 FL (ref 9.2–11.8)
PO2 BLD: 113 MMHG (ref 80–100)
POTASSIUM SERPL-SCNC: 3.8 MMOL/L (ref 3.5–5.5)
PROT SERPL-MCNC: 7.5 G/DL (ref 6.4–8.2)
RBC # BLD AUTO: 4.24 M/UL (ref 4.2–5.3)
SAO2 % BLD: 98.6 % (ref 92–97)
SARS-COV-2, COV2: DETECTED
SERVICE CMNT-IMP: ABNORMAL
SODIUM SERPL-SCNC: 141 MMOL/L (ref 136–145)
SPECIMEN TYPE: ABNORMAL
TOTAL RESP. RATE, ITRR: 26
TROPONIN-HIGH SENSITIVITY: 14 NG/L (ref 0–54)
WBC # BLD AUTO: 5.9 K/UL (ref 4.6–13.2)

## 2022-12-10 PROCEDURE — 82962 GLUCOSE BLOOD TEST: CPT

## 2022-12-10 PROCEDURE — 71045 X-RAY EXAM CHEST 1 VIEW: CPT

## 2022-12-10 PROCEDURE — 65270000029 HC RM PRIVATE

## 2022-12-10 PROCEDURE — 74011000258 HC RX REV CODE- 258: Performed by: STUDENT IN AN ORGANIZED HEALTH CARE EDUCATION/TRAINING PROGRAM

## 2022-12-10 PROCEDURE — 74011250636 HC RX REV CODE- 250/636: Performed by: STUDENT IN AN ORGANIZED HEALTH CARE EDUCATION/TRAINING PROGRAM

## 2022-12-10 PROCEDURE — 84484 ASSAY OF TROPONIN QUANT: CPT

## 2022-12-10 PROCEDURE — 94762 N-INVAS EAR/PLS OXIMTRY CONT: CPT

## 2022-12-10 PROCEDURE — 85379 FIBRIN DEGRADATION QUANT: CPT

## 2022-12-10 PROCEDURE — 80053 COMPREHEN METABOLIC PANEL: CPT

## 2022-12-10 PROCEDURE — 83605 ASSAY OF LACTIC ACID: CPT

## 2022-12-10 PROCEDURE — 74011000250 HC RX REV CODE- 250: Performed by: EMERGENCY MEDICINE

## 2022-12-10 PROCEDURE — 36600 WITHDRAWAL OF ARTERIAL BLOOD: CPT

## 2022-12-10 PROCEDURE — 74011250637 HC RX REV CODE- 250/637: Performed by: STUDENT IN AN ORGANIZED HEALTH CARE EDUCATION/TRAINING PROGRAM

## 2022-12-10 PROCEDURE — 77010033678 HC OXYGEN DAILY

## 2022-12-10 PROCEDURE — 99285 EMERGENCY DEPT VISIT HI MDM: CPT

## 2022-12-10 PROCEDURE — 74011636637 HC RX REV CODE- 636/637

## 2022-12-10 PROCEDURE — 83880 ASSAY OF NATRIURETIC PEPTIDE: CPT

## 2022-12-10 PROCEDURE — 3E0333Z INTRODUCTION OF ANTI-INFLAMMATORY INTO PERIPHERAL VEIN, PERCUTANEOUS APPROACH: ICD-10-PCS | Performed by: FAMILY MEDICINE

## 2022-12-10 PROCEDURE — 93005 ELECTROCARDIOGRAM TRACING: CPT

## 2022-12-10 PROCEDURE — 87636 SARSCOV2 & INF A&B AMP PRB: CPT

## 2022-12-10 PROCEDURE — 85025 COMPLETE CBC W/AUTO DIFF WBC: CPT

## 2022-12-10 PROCEDURE — 74011250637 HC RX REV CODE- 250/637

## 2022-12-10 RX ORDER — IPRATROPIUM BROMIDE AND ALBUTEROL SULFATE 2.5; .5 MG/3ML; MG/3ML
3 SOLUTION RESPIRATORY (INHALATION)
Status: DISCONTINUED | OUTPATIENT
Start: 2022-12-10 | End: 2022-12-10

## 2022-12-10 RX ORDER — FAMOTIDINE 20 MG/1
20 TABLET, FILM COATED ORAL AS NEEDED
Status: DISCONTINUED | OUTPATIENT
Start: 2022-12-10 | End: 2022-12-10

## 2022-12-10 RX ORDER — ACETAMINOPHEN 500 MG
500 TABLET ORAL
Status: DISCONTINUED | OUTPATIENT
Start: 2022-12-10 | End: 2022-12-13 | Stop reason: HOSPADM

## 2022-12-10 RX ORDER — IPRATROPIUM BROMIDE AND ALBUTEROL SULFATE 2.5; .5 MG/3ML; MG/3ML
3 SOLUTION RESPIRATORY (INHALATION)
Status: COMPLETED | OUTPATIENT
Start: 2022-12-10 | End: 2022-12-10

## 2022-12-10 RX ORDER — INSULIN GLARGINE 100 [IU]/ML
65 INJECTION, SOLUTION SUBCUTANEOUS
Status: DISCONTINUED | OUTPATIENT
Start: 2022-12-10 | End: 2022-12-13 | Stop reason: HOSPADM

## 2022-12-10 RX ORDER — SODIUM CHLORIDE 0.9 % (FLUSH) 0.9 %
5-40 SYRINGE (ML) INJECTION AS NEEDED
Status: DISCONTINUED | OUTPATIENT
Start: 2022-12-10 | End: 2022-12-13 | Stop reason: HOSPADM

## 2022-12-10 RX ORDER — LISINOPRIL 10 MG/1
10 TABLET ORAL DAILY
Status: DISCONTINUED | OUTPATIENT
Start: 2022-12-10 | End: 2022-12-13 | Stop reason: HOSPADM

## 2022-12-10 RX ORDER — LISINOPRIL 20 MG/1
20 TABLET ORAL DAILY
Status: DISCONTINUED | OUTPATIENT
Start: 2022-12-10 | End: 2022-12-10

## 2022-12-10 RX ORDER — INSULIN LISPRO 100 [IU]/ML
INJECTION, SOLUTION INTRAVENOUS; SUBCUTANEOUS
Status: DISCONTINUED | OUTPATIENT
Start: 2022-12-10 | End: 2022-12-13 | Stop reason: HOSPADM

## 2022-12-10 RX ORDER — MAGNESIUM SULFATE HEPTAHYDRATE 40 MG/ML
2 INJECTION, SOLUTION INTRAVENOUS ONCE
Status: COMPLETED | OUTPATIENT
Start: 2022-12-10 | End: 2022-12-10

## 2022-12-10 RX ORDER — SIMETHICONE 125 MG
125 TABLET,CHEWABLE ORAL
Status: DISCONTINUED | OUTPATIENT
Start: 2022-12-10 | End: 2022-12-10

## 2022-12-10 RX ORDER — AZITHROMYCIN 250 MG/1
250 TABLET, FILM COATED ORAL SEE ADMIN INSTRUCTIONS
Status: DISCONTINUED | OUTPATIENT
Start: 2022-12-10 | End: 2022-12-10

## 2022-12-10 RX ORDER — ENOXAPARIN SODIUM 100 MG/ML
40 INJECTION SUBCUTANEOUS DAILY
Status: DISCONTINUED | OUTPATIENT
Start: 2022-12-11 | End: 2022-12-12

## 2022-12-10 RX ORDER — POLYETHYLENE GLYCOL 3350 17 G/17G
17 POWDER, FOR SOLUTION ORAL AS NEEDED
Status: DISCONTINUED | OUTPATIENT
Start: 2022-12-10 | End: 2022-12-10

## 2022-12-10 RX ORDER — LISINOPRIL 10 MG/1
10 TABLET ORAL DAILY
Status: DISCONTINUED | OUTPATIENT
Start: 2022-12-11 | End: 2022-12-10

## 2022-12-10 RX ORDER — BUDESONIDE AND FORMOTEROL FUMARATE DIHYDRATE 160; 4.5 UG/1; UG/1
2 AEROSOL RESPIRATORY (INHALATION)
Status: DISCONTINUED | OUTPATIENT
Start: 2022-12-10 | End: 2022-12-13 | Stop reason: HOSPADM

## 2022-12-10 RX ORDER — MAGNESIUM SULFATE 100 %
16 CRYSTALS MISCELLANEOUS AS NEEDED
Status: DISCONTINUED | OUTPATIENT
Start: 2022-12-10 | End: 2022-12-13 | Stop reason: HOSPADM

## 2022-12-10 RX ORDER — POLYETHYLENE GLYCOL 3350 17 G/17G
17 POWDER, FOR SOLUTION ORAL DAILY PRN
Status: DISCONTINUED | OUTPATIENT
Start: 2022-12-10 | End: 2022-12-13 | Stop reason: HOSPADM

## 2022-12-10 RX ORDER — BUDESONIDE 0.25 MG/2ML
250 INHALANT ORAL
Status: DISCONTINUED | OUTPATIENT
Start: 2022-12-10 | End: 2022-12-10

## 2022-12-10 RX ORDER — PREDNISONE 5 MG/1
5 TABLET ORAL DAILY
Status: ON HOLD | COMMUNITY
End: 2022-12-13 | Stop reason: SDUPTHER

## 2022-12-10 RX ORDER — FAMOTIDINE 20 MG/1
20 TABLET, FILM COATED ORAL AS NEEDED
COMMUNITY

## 2022-12-10 RX ORDER — ARFORMOTEROL TARTRATE 15 UG/2ML
15 SOLUTION RESPIRATORY (INHALATION)
Status: DISCONTINUED | OUTPATIENT
Start: 2022-12-10 | End: 2022-12-10

## 2022-12-10 RX ORDER — ASPIRIN 81 MG/1
81 TABLET ORAL DAILY
Status: DISCONTINUED | OUTPATIENT
Start: 2022-12-11 | End: 2022-12-13 | Stop reason: HOSPADM

## 2022-12-10 RX ORDER — AZITHROMYCIN 250 MG/1
250 TABLET, FILM COATED ORAL
Status: DISCONTINUED | OUTPATIENT
Start: 2022-12-12 | End: 2022-12-13 | Stop reason: HOSPADM

## 2022-12-10 RX ORDER — MONTELUKAST SODIUM 10 MG/1
10 TABLET ORAL
Status: DISCONTINUED | OUTPATIENT
Start: 2022-12-10 | End: 2022-12-13 | Stop reason: HOSPADM

## 2022-12-10 RX ORDER — FLUTICASONE PROPIONATE 50 MCG
2 SPRAY, SUSPENSION (ML) NASAL DAILY
Status: DISCONTINUED | OUTPATIENT
Start: 2022-12-11 | End: 2022-12-13 | Stop reason: HOSPADM

## 2022-12-10 RX ORDER — POTASSIUM CHLORIDE 20 MEQ/1
20 TABLET, EXTENDED RELEASE ORAL DAILY
Status: DISCONTINUED | OUTPATIENT
Start: 2022-12-11 | End: 2022-12-13 | Stop reason: HOSPADM

## 2022-12-10 RX ORDER — FAMOTIDINE 20 MG/1
20 TABLET, FILM COATED ORAL DAILY PRN
Status: DISCONTINUED | OUTPATIENT
Start: 2022-12-10 | End: 2022-12-13 | Stop reason: HOSPADM

## 2022-12-10 RX ORDER — FUROSEMIDE 40 MG/1
40 TABLET ORAL 2 TIMES DAILY
Status: DISCONTINUED | OUTPATIENT
Start: 2022-12-10 | End: 2022-12-13 | Stop reason: HOSPADM

## 2022-12-10 RX ORDER — AZELASTINE 1 MG/ML
2 SPRAY, METERED NASAL DAILY PRN
Status: DISCONTINUED | OUTPATIENT
Start: 2022-12-10 | End: 2022-12-13 | Stop reason: HOSPADM

## 2022-12-10 RX ORDER — BUDESONIDE 1 MG/2ML
1000 INHALANT ORAL DAILY
Status: DISCONTINUED | OUTPATIENT
Start: 2022-12-11 | End: 2022-12-10

## 2022-12-10 RX ORDER — INSULIN GLARGINE 100 [IU]/ML
5 INJECTION, SOLUTION SUBCUTANEOUS DAILY
Status: DISCONTINUED | OUTPATIENT
Start: 2022-12-11 | End: 2022-12-13

## 2022-12-10 RX ORDER — IPRATROPIUM BROMIDE AND ALBUTEROL SULFATE 2.5; .5 MG/3ML; MG/3ML
3 SOLUTION RESPIRATORY (INHALATION)
COMMUNITY

## 2022-12-10 RX ORDER — PANTOPRAZOLE SODIUM 40 MG/1
40 TABLET, DELAYED RELEASE ORAL
Status: DISCONTINUED | OUTPATIENT
Start: 2022-12-11 | End: 2022-12-13 | Stop reason: HOSPADM

## 2022-12-10 RX ORDER — SIMETHICONE 80 MG
80 TABLET,CHEWABLE ORAL
Status: DISCONTINUED | OUTPATIENT
Start: 2022-12-10 | End: 2022-12-13 | Stop reason: HOSPADM

## 2022-12-10 RX ORDER — DEXTROSE MONOHYDRATE 100 MG/ML
0-250 INJECTION, SOLUTION INTRAVENOUS AS NEEDED
Status: DISCONTINUED | OUTPATIENT
Start: 2022-12-10 | End: 2022-12-13 | Stop reason: HOSPADM

## 2022-12-10 RX ADMIN — MONTELUKAST 10 MG: 10 TABLET, FILM COATED ORAL at 22:25

## 2022-12-10 RX ADMIN — REMDESIVIR 200 MG: 100 INJECTION, POWDER, LYOPHILIZED, FOR SOLUTION INTRAVENOUS at 22:25

## 2022-12-10 RX ADMIN — METHYLPREDNISOLONE SODIUM SUCCINATE 125 MG: 125 INJECTION, POWDER, FOR SOLUTION INTRAMUSCULAR; INTRAVENOUS at 18:32

## 2022-12-10 RX ADMIN — LISINOPRIL 10 MG: 10 TABLET ORAL at 23:25

## 2022-12-10 RX ADMIN — INSULIN GLARGINE 65 UNITS: 100 INJECTION, SOLUTION SUBCUTANEOUS at 22:40

## 2022-12-10 RX ADMIN — MAGNESIUM SULFATE HEPTAHYDRATE 2 G: 40 INJECTION, SOLUTION INTRAVENOUS at 18:32

## 2022-12-10 RX ADMIN — IPRATROPIUM BROMIDE AND ALBUTEROL SULFATE 3 ML: .5; 3 SOLUTION RESPIRATORY (INHALATION) at 18:32

## 2022-12-10 RX ADMIN — FUROSEMIDE 40 MG: 40 TABLET ORAL at 22:25

## 2022-12-10 RX ADMIN — Medication 9 UNITS: at 22:40

## 2022-12-10 RX ADMIN — BUDESONIDE AND FORMOTEROL FUMARATE DIHYDRATE 2 PUFF: 160; 4.5 AEROSOL RESPIRATORY (INHALATION) at 22:25

## 2022-12-10 NOTE — ED TRIAGE NOTES
Patient presents to the ED with complaints of worsening SOB. Was seen at [place] on the 8th and was sent home and states that she has gotten worse.      Normally wears 2L NC and has to bump to 3L NC    Tachypneic in triage

## 2022-12-10 NOTE — ED NOTES
Pt medicated per MAR. Continues to be unable to get through a full sentences. On 2 liters NC, Pt reports she is bringing her system from home because she can not tolerate BIPAP.

## 2022-12-10 NOTE — ED PROVIDER NOTES
The patient is a 44-year-old woman with past medical history significant for asthma and COPD, diabetes, hyperlipidemia, hypertension and obstructive sleep apnea on a trilogy machine at night, who presents the ED today with increasing shortness of breath. She states that her symptoms began on December 8, 2022. She was seen at [facility] on Wednesday. She states that they gave her Solu-Medrol, oral magnesium, and nebulizer treatments and sent her home. Right now she states that she is very short of breath even with talking. She is normally on 2 L of oxygen at home but needs to increase it to 3 L to walk. She is also complaining of a productive cough. She states that she normally does not have a productive cough with COPD exacerbations. She is concerned that she may have a respiratory infection. Her great grandbaby is sick at home. Her daughter had the flu last week. She denies any fevers, chills, nausea or vomiting.        Past Medical History:   Diagnosis Date    Asthma     Chronic lung disease     COPD     Cystocele, midline     Diabetes mellitus (HCC)     GERD (gastroesophageal reflux disease)     Hidradenitis suppurativa     Hyperlipidemia     Hypertension     SHERRIE on CPAP     CPAP    Stress incontinence        Past Surgical History:   Procedure Laterality Date    HX APPENDECTOMY      HX CATARACT REMOVAL Right     HX CHOLECYSTECTOMY      HX DILATION AND CURETTAGE      Dysfunctional uterine bleeding, thought 2/2 fibroids    HX HEART CATHETERIZATION  12/2020    HX TUBAL LIGATION      FL BREAST SURGERY PROCEDURE UNLISTED      Right breast benign tumor removal         Family History:   Problem Relation Age of Onset    Hypertension Mother     Stroke Mother     Breast Cancer Mother         Bilateral mastectomies    Cancer Mother         ovarian and breast    Heart Failure Mother     Ovarian Cancer Mother     Heart Attack Father         2011    Heart Surgery Father         CABG    Heart Failure Father COPD Sister         Heavy smoker    Cancer Sister         ovarian    Heart Failure Sister     Lung Disease Sister     Asthma Child     Cancer Maternal Aunt         pancreatic    Cancer Maternal Grandfather         stomach       Social History     Socioeconomic History    Marital status: LEGALLY      Spouse name: Not on file    Number of children: Not on file    Years of education: Not on file    Highest education level: Not on file   Occupational History    Not on file   Tobacco Use    Smoking status: Former     Packs/day: 1.00     Years: 30.00     Pack years: 30.00     Types: Cigarettes     Start date: 1966     Quit date: 2006     Years since quittin.2    Smokeless tobacco: Former    Tobacco comments:     1-1.5 packs per day   Vaping Use    Vaping Use: Never used   Substance and Sexual Activity    Alcohol use: No    Drug use: No    Sexual activity: Not Currently   Other Topics Concern    Not on file   Social History Narrative    Not on file     Social Determinants of Health     Financial Resource Strain: Not on file   Food Insecurity: Not on file   Transportation Needs: Not on file   Physical Activity: Not on file   Stress: Not on file   Social Connections: Not on file   Intimate Partner Violence: Not on file   Housing Stability: Not on file         ALLERGIES: Ancef [cefazolin], Contrast agent [iodine], Fish containing products, Statins-hmg-coa reductase inhibitors, Metformin, and Codeine    Review of Systems   All other systems reviewed and are negative. Vitals:    12/10/22 1457 12/10/22 1458 12/10/22 1504 12/10/22 1522   BP:  (!) 188/95  (!) 167/70   Pulse: 85   81   Resp:   30    Temp: 97.9 °F (36.6 °C)      SpO2: 97%   98%            Physical Exam  Vitals and nursing note reviewed. Constitutional:       Appearance: She is obese. HENT:      Head: Normocephalic and atraumatic. Mouth/Throat:      Mouth: Mucous membranes are moist.      Pharynx: Oropharynx is clear.    Eyes: Extraocular Movements: Extraocular movements intact. Pupils: Pupils are equal, round, and reactive to light. Cardiovascular:      Rate and Rhythm: Normal rate and regular rhythm. Pulses: Normal pulses. Heart sounds: Normal heart sounds. Pulmonary:      Effort: Pulmonary effort is normal.      Breath sounds: Examination of the right-upper field reveals wheezing. Examination of the left-upper field reveals wheezing. Examination of the right-middle field reveals wheezing. Examination of the left-middle field reveals wheezing. Examination of the right-lower field reveals wheezing. Examination of the left-lower field reveals wheezing. Wheezing present. Abdominal:      General: Bowel sounds are normal.      Palpations: Abdomen is soft. Musculoskeletal:      Cervical back: Normal range of motion and neck supple. Right lower leg: No edema. Left lower leg: No edema. Skin:     General: Skin is warm and dry. Capillary Refill: Capillary refill takes less than 2 seconds. Neurological:      General: No focal deficit present. Mental Status: She is alert and oriented to person, place, and time. Psychiatric:         Mood and Affect: Mood normal.         Behavior: Behavior normal.      Recent Results (from the past 12 hour(s))   CBC WITH AUTOMATED DIFF    Collection Time: 12/10/22  3:20 PM   Result Value Ref Range    WBC 5.9 4.6 - 13.2 K/uL    RBC 4.24 4.20 - 5.30 M/uL    HGB 12.2 12.0 - 16.0 g/dL    HCT 36.9 35.0 - 45.0 %    MCV 87.0 78.0 - 100.0 FL    MCH 28.8 24.0 - 34.0 PG    MCHC 33.1 31.0 - 37.0 g/dL    RDW 14.2 11.6 - 14.5 %    PLATELET 883 850 - 615 K/uL    MPV 8.5 (L) 9.2 - 11.8 FL    NRBC 0.0 0  WBC    ABSOLUTE NRBC 0.00 0.00 - 0.01 K/uL    NEUTROPHILS 66 40 - 73 %    LYMPHOCYTES 21 21 - 52 %    MONOCYTES 12 (H) 3 - 10 %    EOSINOPHILS 0 0 - 5 %    BASOPHILS 0 0 - 2 %    IMMATURE GRANULOCYTES 1 (H) 0.0 - 0.5 %    ABS. NEUTROPHILS 3.9 1.8 - 8.0 K/UL    ABS.  LYMPHOCYTES 1.2 0.9 - 3.6 K/UL    ABS. MONOCYTES 0.7 0.05 - 1.2 K/UL    ABS. EOSINOPHILS 0.0 0.0 - 0.4 K/UL    ABS. BASOPHILS 0.0 0.0 - 0.1 K/UL    ABS. IMM. GRANS. 0.1 (H) 0.00 - 0.04 K/UL    DF AUTOMATED     METABOLIC PANEL, COMPREHENSIVE    Collection Time: 12/10/22  3:20 PM   Result Value Ref Range    Sodium 141 136 - 145 mmol/L    Potassium 3.8 3.5 - 5.5 mmol/L    Chloride 102 100 - 111 mmol/L    CO2 30 21 - 32 mmol/L    Anion gap 9 3.0 - 18 mmol/L    Glucose 166 (H) 74 - 99 mg/dL    BUN 15 7.0 - 18 MG/DL    Creatinine 0.92 0.6 - 1.3 MG/DL    BUN/Creatinine ratio 16 12 - 20      eGFR >60 >60 ml/min/1.73m2    Calcium 9.1 8.5 - 10.1 MG/DL    Bilirubin, total 0.7 0.2 - 1.0 MG/DL    ALT (SGPT) 39 13 - 56 U/L    AST (SGOT) 20 10 - 38 U/L    Alk. phosphatase 98 45 - 117 U/L    Protein, total 7.5 6.4 - 8.2 g/dL    Albumin 3.7 3.4 - 5.0 g/dL    Globulin 3.8 2.0 - 4.0 g/dL    A-G Ratio 1.0 0.8 - 1.7     NT-PRO BNP    Collection Time: 12/10/22  3:20 PM   Result Value Ref Range    NT pro- 0 - 900 PG/ML   TROPONIN-HIGH SENSITIVITY    Collection Time: 12/10/22  3:20 PM   Result Value Ref Range    Troponin-High Sensitivity 14 0 - 54 ng/L   D DIMER    Collection Time: 12/10/22  3:20 PM   Result Value Ref Range    D DIMER 0.48 (H) <0.46 ug/ml(FEU)   COVID-19 WITH INFLUENZA A/B    Collection Time: 12/10/22  3:30 PM   Result Value Ref Range    SARS-CoV-2 by PCR Detected (AA) NOTD      Influenza A by PCR Not detected NOTD      Influenza B by PCR Not detected NOTD     BLOOD GAS,LACTIC ACID, POC    Collection Time: 12/10/22  3:42 PM   Result Value Ref Range    Device: NASAL CANNULA      FIO2 (POC) 40 %    pH (POC) 7.43 7.35 - 7.45      pCO2 (POC) 43.5 35.0 - 45.0 MMHG    pO2 (POC) 113 (H) 80 - 100 MMHG    HCO3 (POC) 29.0 (H) 22 - 26 MMOL/L    sO2 (POC) 98.6 (H) 92 - 97 %    Base excess (POC) 3.9 mmol/L    Allens test (POC) Positive      Total resp. rate 26      Site RIGHT RADIAL      Patient temp.  97.9      Specimen type (POC) ARTERIAL Performed by Savanna Lozoya     Lactic Acid (POC) 1.57 0.40 - 2.00 mmol/L     XR CHEST PORT   Final Result   1. No acute infiltrate or effusion. 2.  Mild chronic interstitial changes. MDM  Number of Diagnoses or Management Options  Diagnosis management comments: The patient is a 65-year-old woman with past medical history significant for asthma and COPD, who presents to the ED today with shortness of breath since this past Wednesday. She presented with conversational dyspnea and hypoxia. Initially we had planned to put her on BiPAP. She was unable to tolerate it. At that point we put her on 2 L nasal cannula. She called her family members and asked them to bring her trilogy vent to the ED. Eventually she was placed on that. Her chest x-ray is negative but her COVID test is positive. I have consulted family medicine for admission for further evaluation and management. Patient accepted the patient their service. Procedures        Critical Care Procedure Note  Authorized and Performed by: MD Name  Total critical care time: Approximately 40 minutes  Due to a high probability of clinically significant, life threatening deterioration, the patient required my highest level of preparedness to intervene emergently and I personally spent this critical care time directly and personally managing the patient. This critical care time included obtaining a history; examining the patient; pulse oximetry; ordering and review of studies; arranging urgent treatment with development of a management plan; evaluation of patient's response to treatment; frequent reassessment; and, discussions with other providers. This critical care time was performed to assess and manage the high probability of imminent, life-threatening deterioration that could result in multi-organ failure. It was exclusive of separately billable procedures and treating other patients and teaching time.   Please see MDM section and the rest of the note for further information on patient assessment and treatment.

## 2022-12-10 NOTE — Clinical Note
Status[de-identified] INPATIENT [101]   Type of Bed: Telemetry [19]   Cardiac Monitoring Required?: Yes   Inpatient Hospitalization Certified Necessary for the Following Reasons: 3.  Patient receiving treatment that can only be provided in an inpatient setting (further clarification in H&P documentation)   Admitting Diagnosis: Acute respiratory disease due to COVID-19 virus [[id number]]   Admitting Physician: Tia Ramirez   Attending Physician: Tia Ramirez   Estimated Length of Stay: 2 Midnights   Discharge Plan[de-identified] Home with Office Follow-up

## 2022-12-10 NOTE — ED NOTES
Assumed care of pt in rm 18. Pt here for increased SOB. Pt wears 2 liters NC at home. Pt unable to get through a full sentence, noted with work of breathing. Paged resp to assist with care. Lung sounds diminished, rhonchi heard. Pt placed on full cardiac monitor, 20ga PIV started to [facility], labs obtained and sent.

## 2022-12-11 LAB
ANION GAP SERPL CALC-SCNC: 8 MMOL/L (ref 3–18)
ATRIAL RATE: 81 BPM
BASOPHILS # BLD: 0 K/UL (ref 0–0.1)
BASOPHILS NFR BLD: 0 % (ref 0–2)
BUN SERPL-MCNC: 16 MG/DL (ref 7–18)
BUN/CREAT SERPL: 16 (ref 12–20)
CALCIUM SERPL-MCNC: 8.9 MG/DL (ref 8.5–10.1)
CALCULATED P AXIS, ECG09: 60 DEGREES
CALCULATED R AXIS, ECG10: 52 DEGREES
CALCULATED T AXIS, ECG11: 10 DEGREES
CHLORIDE SERPL-SCNC: 102 MMOL/L (ref 100–111)
CO2 SERPL-SCNC: 27 MMOL/L (ref 21–32)
CREAT SERPL-MCNC: 1.01 MG/DL (ref 0.6–1.3)
CRP SERPL HS-MCNC: >9.5 MG/L
D DIMER PPP FEU-MCNC: 0.41 UG/ML(FEU)
DIAGNOSIS, 93000: NORMAL
DIFFERENTIAL METHOD BLD: ABNORMAL
EOSINOPHIL # BLD: 0 K/UL (ref 0–0.4)
EOSINOPHIL NFR BLD: 0 % (ref 0–5)
ERYTHROCYTE [DISTWIDTH] IN BLOOD BY AUTOMATED COUNT: 13.9 % (ref 11.6–14.5)
FERRITIN SERPL-MCNC: 20 NG/ML (ref 8–388)
GLUCOSE BLD STRIP.AUTO-MCNC: 187 MG/DL (ref 70–110)
GLUCOSE BLD STRIP.AUTO-MCNC: 229 MG/DL (ref 70–110)
GLUCOSE BLD STRIP.AUTO-MCNC: 241 MG/DL (ref 70–110)
GLUCOSE SERPL-MCNC: 301 MG/DL (ref 74–99)
HCT VFR BLD AUTO: 33.7 % (ref 35–45)
HGB BLD-MCNC: 11.4 G/DL (ref 12–16)
IMM GRANULOCYTES # BLD AUTO: 0.1 K/UL (ref 0–0.04)
IMM GRANULOCYTES NFR BLD AUTO: 1 % (ref 0–0.5)
LYMPHOCYTES # BLD: 0.6 K/UL (ref 0.9–3.6)
LYMPHOCYTES NFR BLD: 11 % (ref 21–52)
MAGNESIUM SERPL-MCNC: 2.4 MG/DL (ref 1.6–2.6)
MCH RBC QN AUTO: 28.6 PG (ref 24–34)
MCHC RBC AUTO-ENTMCNC: 33.8 G/DL (ref 31–37)
MCV RBC AUTO: 84.5 FL (ref 78–100)
MONOCYTES # BLD: 0.1 K/UL (ref 0.05–1.2)
MONOCYTES NFR BLD: 2 % (ref 3–10)
NEUTS SEG # BLD: 4.9 K/UL (ref 1.8–8)
NEUTS SEG NFR BLD: 86 % (ref 40–73)
NRBC # BLD: 0 K/UL (ref 0–0.01)
NRBC BLD-RTO: 0 PER 100 WBC
P-R INTERVAL, ECG05: 140 MS
PLATELET # BLD AUTO: 267 K/UL (ref 135–420)
PMV BLD AUTO: 8.7 FL (ref 9.2–11.8)
POTASSIUM SERPL-SCNC: 4.7 MMOL/L (ref 3.5–5.5)
PROCALCITONIN SERPL-MCNC: <0.05 NG/ML
Q-T INTERVAL, ECG07: 398 MS
QRS DURATION, ECG06: 130 MS
QTC CALCULATION (BEZET), ECG08: 462 MS
RBC # BLD AUTO: 3.99 M/UL (ref 4.2–5.3)
SODIUM SERPL-SCNC: 137 MMOL/L (ref 136–145)
VENTRICULAR RATE, ECG03: 81 BPM
WBC # BLD AUTO: 5.8 K/UL (ref 4.6–13.2)

## 2022-12-11 PROCEDURE — 74011636637 HC RX REV CODE- 636/637

## 2022-12-11 PROCEDURE — 74011250636 HC RX REV CODE- 250/636: Performed by: STUDENT IN AN ORGANIZED HEALTH CARE EDUCATION/TRAINING PROGRAM

## 2022-12-11 PROCEDURE — 84145 PROCALCITONIN (PCT): CPT

## 2022-12-11 PROCEDURE — 80048 BASIC METABOLIC PNL TOTAL CA: CPT

## 2022-12-11 PROCEDURE — 74011250636 HC RX REV CODE- 250/636

## 2022-12-11 PROCEDURE — 65270000029 HC RM PRIVATE

## 2022-12-11 PROCEDURE — 74011250637 HC RX REV CODE- 250/637

## 2022-12-11 PROCEDURE — 85379 FIBRIN DEGRADATION QUANT: CPT

## 2022-12-11 PROCEDURE — 83735 ASSAY OF MAGNESIUM: CPT

## 2022-12-11 PROCEDURE — 82962 GLUCOSE BLOOD TEST: CPT

## 2022-12-11 PROCEDURE — 86141 C-REACTIVE PROTEIN HS: CPT

## 2022-12-11 PROCEDURE — 74011000258 HC RX REV CODE- 258: Performed by: STUDENT IN AN ORGANIZED HEALTH CARE EDUCATION/TRAINING PROGRAM

## 2022-12-11 PROCEDURE — 82728 ASSAY OF FERRITIN: CPT

## 2022-12-11 PROCEDURE — 85025 COMPLETE CBC W/AUTO DIFF WBC: CPT

## 2022-12-11 RX ORDER — IPRATROPIUM BROMIDE AND ALBUTEROL SULFATE 2.5; .5 MG/3ML; MG/3ML
3 SOLUTION RESPIRATORY (INHALATION)
Status: DISCONTINUED | OUTPATIENT
Start: 2022-12-11 | End: 2022-12-11

## 2022-12-11 RX ORDER — IPRATROPIUM BROMIDE 0.5 MG/2.5ML
0.5 SOLUTION RESPIRATORY (INHALATION)
Status: DISCONTINUED | OUTPATIENT
Start: 2022-12-11 | End: 2022-12-11

## 2022-12-11 RX ADMIN — ENOXAPARIN SODIUM 40 MG: 100 INJECTION SUBCUTANEOUS at 09:48

## 2022-12-11 RX ADMIN — DEXAMETHASONE 6 MG: 2 TABLET ORAL at 09:48

## 2022-12-11 RX ADMIN — PANTOPRAZOLE SODIUM 40 MG: 40 TABLET, DELAYED RELEASE ORAL at 09:48

## 2022-12-11 RX ADMIN — FLUTICASONE PROPIONATE 2 SPRAY: 50 SPRAY, METERED NASAL at 09:58

## 2022-12-11 RX ADMIN — MONTELUKAST 10 MG: 10 TABLET, FILM COATED ORAL at 22:33

## 2022-12-11 RX ADMIN — IPRATROPIUM BROMIDE AND ALBUTEROL 2 PUFF: 20; 100 SPRAY, METERED RESPIRATORY (INHALATION) at 17:58

## 2022-12-11 RX ADMIN — Medication 6 UNITS: at 18:01

## 2022-12-11 RX ADMIN — FUROSEMIDE 40 MG: 40 TABLET ORAL at 09:49

## 2022-12-11 RX ADMIN — ASPIRIN 81 MG: 81 TABLET, COATED ORAL at 09:48

## 2022-12-11 RX ADMIN — POTASSIUM CHLORIDE 20 MEQ: 1500 TABLET, EXTENDED RELEASE ORAL at 09:48

## 2022-12-11 RX ADMIN — FUROSEMIDE 40 MG: 40 TABLET ORAL at 17:58

## 2022-12-11 RX ADMIN — Medication 5 UNITS: at 09:56

## 2022-12-11 RX ADMIN — TIOTROPIUM BROMIDE INHALATION SPRAY 2 PUFF: 3.12 SPRAY, METERED RESPIRATORY (INHALATION) at 09:59

## 2022-12-11 RX ADMIN — INSULIN GLARGINE 65 UNITS: 100 INJECTION, SOLUTION SUBCUTANEOUS at 22:26

## 2022-12-11 RX ADMIN — Medication 3 UNITS: at 09:57

## 2022-12-11 RX ADMIN — BUDESONIDE AND FORMOTEROL FUMARATE DIHYDRATE 2 PUFF: 160; 4.5 AEROSOL RESPIRATORY (INHALATION) at 09:59

## 2022-12-11 RX ADMIN — REMDESIVIR 100 MG: 100 INJECTION, POWDER, LYOPHILIZED, FOR SOLUTION INTRAVENOUS at 22:27

## 2022-12-11 RX ADMIN — BUDESONIDE AND FORMOTEROL FUMARATE DIHYDRATE 2 PUFF: 160; 4.5 AEROSOL RESPIRATORY (INHALATION) at 22:27

## 2022-12-11 RX ADMIN — Medication 6 UNITS: at 22:26

## 2022-12-11 NOTE — ED NOTES
Assumed care of pt in rm 18. Pt here for increased SOB. Pt normally wears 2 liters NC. Pt reports improved s/s. Awaiting a floor bed. Pt medicated per MAR.

## 2022-12-11 NOTE — PROGRESS NOTES
attempted to complete the initial Spiritual Assessment of the patient, and offer Pastoral Care  support to the patient. Found her busy with staff. Patient is in isolation due to possible Covid infection as well. There is no advance directive present but there is a DNR order for this patient. Patient does not have any Uatsdin/cultural needs that will affect patients preferences in health care. Chaplains will continue to follow and will provide pastoral care on an as needed/requested basis.     Amanda Fraser  Spiritual Care Department  [phone]

## 2022-12-11 NOTE — ACP (ADVANCE CARE PLANNING)
Advance Care Planning   Advance Care Planning Inpatient Note  [facility] Care Department    Today's Date: 12/11/2022  Unit: [facility] EMERGENCY DEPT    Received request from . Upon review of chart and communication with care team, patient's decision making abilities are not in question. Patient was/were present in the room during visit. Goals of ACP Conversation:  Discuss Advance Care planning documents    Health Care Decision Makers:      Primary Decision Maker: Caryn Curran - Daughter - [phone]    Secondary Decision Maker: Valeria Villarreal - Sister - [phone]    Summary:  No Decision Maker named by patient at this time    Advance Care Planning Documents (Patient Wishes) on file:  None     Assessment:       attempted to visit with patient in room 18 today but found her busy with the staff. Patient is in an isolation room due to possible Covid infection. There is no advance directive present but there is a DNR order on file for the patient.     Interventions:  Deferred conversation as patient not interested in completing an advance directive at this time    Care Preferences Communicated:  No    Outcomes/Plan:      Gabino Cormier, [address] on 12/11/2022 at 12:31 PM

## 2022-12-11 NOTE — H&P
[facility].  Admission History and Physical      Patient:    Leonid Gomez      60 y.o. female            MRN:       [id number]                                                                                    Admission Date:         12/10/2022  Code status:                DNR/DNI    Leonid Gomez is a 60y.o. year old female admitted for Hypoxia [R09.02]  COVID [U07.1]  Acute respiratory disease due to COVID-19 virus [U07.1, J06.9]  Acute hypoxemic respiratory failure due to COVID-19 ([facility] [U07.1, J96.01].      ASSESSMENT AND PLAN  Problem List Items Addressed This Visit    None      COPD Exacerbation 2/2 Covid-19 Infection  H/o COPD-Asthma Cross-Over Syndrome  SHERRIE  -follows with pulmonology, Dr. Rambo Grimaldo  -home meds: advair BID, albuterol Q4H PRN, azithromycin 250mg every Mon/Wed/Fri, montelukast 10 QHS, prednisone 5mg daily, spiriva daily, budesonide neb daily, duoneb Q4H PRN (at baseline takes BID), azelastine BID, flonase BID, Fasenra 30mg SubQ every 8 weeks (goes to [facility] for this)  -baseline O2: 2L at rest, 3L with exertion  -uses Trelegy at night   -patient reports 5 days of worsening SOB, congestion & productive cough  requiring 3L O2 with increased use of PRN nebulizers and use of trelegy intermittently throughout the day; otherwise no fever/chills, N/V/abd pain  -went to [address] [place] [address] ED on Wed 10/7, received solumedrol, Mg repletion, and duoneds, was sent home with oral prednisone 50mg  -reports only having taken one dose of pred 50 since DC from ED given poor PO intake, has not taken any of her oral home meds since Thursday  -sick contacts include daughter (flu) and great granddaughter (URI)  -notable labs: Mg 1.6, WBC 5.9, D-dimer 0.48, Cr 0.92, lactic acid 1.57, trop 14,   -ABG: pH 7.43, pCO2 43.5, pO2 113  -covid positive, flu negative  -vitals stable saturating appropriately in ED, remained afebrile  -CXR: no acute processes, mild chronic interstitial changes  -PE notable for decreased breath sounds bilaterally with trace rhonchi more prominent on expiration  -s/p solumedrol 125mg, Mg repletion, duoneb in ED  Plan:  -start decadron PO 6mg daily  -pharmacy consult placed to dose remdesivir  -continue supplemental O2 with goal SpO2 >88% (currently on 3L), PM trelegy (pt's daughter brought her machine from home)  -continue home meds/inhalers/nebulizers  -incentive spirometry   -trend inflammatory markers - CRP, D-dimer, ferritin, procal     DM, Type 2  Gastroparesis  -home meds: lantus 5u in AM 65u in PM, lispro SS (if BG <100 no insulin, if 100-200 give 6u, if 201-300 give 8u, if >300 give 12u), miralax PRN, glycerin suppository PRN  -per patient last HbA1c 7.7% in Nov  Plan:  -continue lantus at home dosing  -POC glucose monitoring ACHS  -high dose correctional insulin given steroid administration   -hypoglycemia protocol in place      HTN  CHF  CAD  -home meds: ASA 81, lasix 40mg bid, lisinopril 10mg daily, KCL ER 20mEq  -patient has been weighing herself everyday at home and notes no weight gain this week  -no signs of fluid overload on exam or CXR  -of note BP elevated to 170-190s over 60-70s on arrival, has not taken home meds today or yesterday due to poor PO intake   Plan:  -continue ASA, lasix, lisinopril, KCL while admitted  -monitor vitals  -would caution against IVF given h/o CHF, encourage PO intake     GERD  -home meds: omeprazole 40mg daily, famotidine 20mg PRN for breakthrough heartburn  Plan:  -continue home meds while admitted       Global:  Code: DNR/DNI  IVF/Drips: na  Wt: Per pt has not gained any weight in the last week, stat order for weight placed  Diet: Regular, advance as tolerated (poor PO intake leading up to admission)  Bowel Regimen, Last BM: PRN Miralax  DVT/AC: Lovenox  Mobility: per protocol   Disposition: pending  Anticipated LOS: 3 days     Point of Contact: Sana Arrieta (daughter) [phone]      SUBJECTIVE:  History of Present Illness:  Julian Espinoza is a 60 y.o. female with PMHx of COPD, asthma, DM, HLD, CHF, CAD, SHERRIE, GERD, anxiety, arthritis, osteoporosis who presented to [facility] ED on 12/10/2022 with complaint of increased SOB and productive cough x5 days. Patient reports symptoms started Tuesday and progressively have worsened, requiring her to increase her home O2 to 3L, increased use of PRN nebulizers, and intermittent trelegy use in daytime. Denies fever, chills, N/v, extremity swelling. Went to [facility] ED on Wednesday and received IV steroids, duonebs, and Mg repletion and was sent home with a course of PO prednisone 50mg. She reports only having taken 1 dose of Pred 50mg since then due to poor PO intake. Has had multiple sick contacts in the last week with flu and URI, but known whom had covid that she's aware of.      ED Course:  -Afebrile, VSS on room air  -Labs: Mg 1.6, WBC 5.9, D-dimer 0.48, Cr 0.92, lactic acid 1.57, trop 14, ; ABG: pH 7.43, pCO2 43.5, pO2 113; covid (+), flu (-)  -Imaging: CXR: no acute processes, mild chronic interstitial changes  -Meds: solumedrol 125mg, Mg repletion, duoneb  -IVF: none     Did non-adherence with patient's outpatient treatment plan contribute to this admission?  no       PMHx, PSHx, SHx, FHx, MEDS, ALLERGIES:   Past Medical History:   Diagnosis Date    Asthma     Chronic lung disease     COPD     Cystocele, midline     Diabetes mellitus ([facility]     GERD (gastroesophageal reflux disease)     Hidradenitis suppurativa     Hyperlipidemia     Hypertension     SHERRIE on CPAP     CPAP    Stress incontinence       Past Surgical History:   Procedure Laterality Date    HX APPENDECTOMY      HX CATARACT REMOVAL Right     HX CHOLECYSTECTOMY      HX DILATION AND CURETTAGE      Dysfunctional uterine bleeding, thought 2/2 fibroids    HX HEART CATHETERIZATION  12/2020    HX TUBAL LIGATION      UT BREAST SURGERY PROCEDURE UNLISTED      Right breast benign tumor removal      Social History     Tobacco Use    Smoking status: Former     Packs/day: 1.00     Years: 30.00     Pack years: 30.00     Types: Cigarettes     Start date: 1966     Quit date: 2006     Years since quittin.2    Smokeless tobacco: Former    Tobacco comments:     1-1.5 packs per day   Vaping Use    Vaping Use: Never used   Substance Use Topics    Alcohol use: No    Drug use: No     Family History   Problem Relation Age of Onset    Hypertension Mother     Stroke Mother     Breast Cancer Mother         Bilateral mastectomies    Cancer Mother         ovarian and breast    Heart Failure Mother     Ovarian Cancer Mother     Heart Attack Father         2011    Heart Surgery Father         CABG    Heart Failure Father     COPD Sister         Heavy smoker    Cancer Sister         ovarian    Heart Failure Sister     Lung Disease Sister     Asthma Child     Cancer Maternal Aunt         pancreatic    Cancer Maternal Grandfather         stomach     Allergies   Allergen Reactions    Ancef [Cefazolin] Hives    Contrast Agent [Iodine] Anaphylaxis, Shortness of Breath and Swelling     Needs pre-medication for IV contrast with Benadryl, Solu-Medrol    Fish Containing Products Anaphylaxis     Pt states she had a severe allergic reaction at 7 y/o. Statins-Hmg-Coa Reductase Inhibitors Myalgia    Metformin Other (comments)     Abdominal pain, diarrhea.     Codeine Other (comments)     Altered mental status       Current Facility-Administered Medications   Medication Dose Route Frequency Provider Last Rate Last Admin    sodium chloride (NS) flush 5-40 mL  5-40 mL IntraVENous PRN Evon Iqbal DO        polyethylene glycol (MIRALAX) packet 17 g  17 g Oral DAILY PRN Makenzie Espinoza DO        [START ON 2022] enoxaparin (LOVENOX) injection 40 mg  40 mg SubCUTAneous DAILY Evon Iqbal DO        albuterol-ipratropium (DUO-NEB) 2.5 MG-0.5 MG/3 ML  3 mL Nebulization Q4H PRN Saran [facility] [employer],         [START ON 12/11/2022] dexAMETHasone (DECADRON) tablet 6 mg  6 mg Oral DAILY Evon Iqbal DO        acetaminophen (TYLENOL) tablet 500 mg  500 mg Oral Q6H PRN Mary Kraft DO        [START ON 12/11/2022] aspirin delayed-release tablet 81 mg  81 mg Oral DAILY Mary Kraft DO        azelastine (ASTELIN) 137mcg/spray nasal spray  2 Spray Both Nostrils PRN Mary Kraft DO        azithromycin (ZITHROMAX) tablet 250 mg  250 mg Oral See Admin Instructions Mary Kraft DO        arformoteroL (BROVANA) neb solution 15 mcg  15 mcg Nebulization BID RT Evon Iqbal DO        And    budesonide (PULMICORT) 250 mcg/2ml nebulizer susp  250 mcg Nebulization BID RT Evon Iqbal DO        famotidine (PEPCID) tablet 20 mg  20 mg Oral PRN Mary Kraft DO        [START ON 12/11/2022] fluticasone propionate (FLONASE) 50 mcg/actuation nasal spray 2 Spray  2 Spray Both Nostrils DAILY Evon Iqbal DO        furosemide (LASIX) tablet 40 mg  40 mg Oral BID Evon Iqbal DO        insulin glargine (LANTUS) injection 65 Units  65 Units SubCUTAneous QHS Evon Iqbal DO        [START ON 12/11/2022] lisinopriL (PRINIVIL, ZESTRIL) tablet 10 mg  10 mg Oral DAILY Evon Iqbal DO        montelukast (SINGULAIR) tablet 10 mg  10 mg Oral QHS Evon Iqbal DO        [START ON 12/11/2022] pantoprazole (PROTONIX) tablet 40 mg  40 mg Oral ACB Evon Iqbal DO        polyethylene glycol (MIRALAX) powder 17 g  17 g Oral PRN Mary Kraft DO        [START ON 12/11/2022] potassium chloride (K-DUR, KLOR-CON M20) SR tablet 20 mEq  20 mEq Oral DAILY Mary Kraft DO        simethicone (GAS-X) chewable tablet 125 mg  125 mg Oral QID PRN Mary Kraft DO        . PHARMACY TO SUBSTITUTE PER PROTOCOL (Reordered from: Spiriva Respimat 2.5 mcg/actuation inhaler)    Per Protocol Mary Kraft DO        [START ON 12/11/2022] insulin glargine (LANTUS) injection 5 Units  5 Units SubCUTAneous DAILY Evon Iqbal DO        insulin lispro (HUMALOG) injection   SubCUTAneous AC&HS Christi Yost DO        glucose chewable tablet 16 g  16 g Oral PRN Christi Yost DO        glucagon (GLUCAGEN) injection 1 mg  1 mg IntraMUSCular PRN Evon Iqbal DO        dextrose 10% infusion 0-250 mL  0-250 mL IntraVENous PRN Evon Iqbal DO        remdesivir 200 mg in 0.9% sodium chloride 250 mL IVPB  200 mg IntraVENous ONCE Dedra GUTIERREZ MD        Followed by    Kyle Butterfield ON 12/11/2022] remdesivir 100 mg in 0.9% sodium chloride 250 mL IVPB  100 mg IntraVENous Q24H Dedra GUTIERREZ MD         Current Outpatient Medications   Medication Sig Dispense Refill    albuterol-ipratropium (DUO-NEB) 2.5 mg-0.5 mg/3 ml nebu 3 mL by Nebulization route every four (4) hours as needed for Wheezing. predniSONE (DELTASONE) 5 mg tablet Take 5 mg by mouth daily. montelukast (SINGULAIR) 10 mg tablet Take 1 tablet by mouth nightly 90 Tablet 0    fluticasone propion-salmeteroL (Advair HFA) 230-21 mcg/actuation inhaler Take 2 Puffs by inhalation two (2) times a day. Rinse and gargle after each use. Disp: 3 month 3 Each 3    budesonide (PULMICORT) 1 mg/2 mL nbsp Take 2 mL by inhalation daily. 90 Each 3    cholecalciferol (VITAMIN D3) (2,000 UNITS /50 MCG) cap capsule Take 5,000 Units by mouth every seven (7) days. potassium chloride (K-DUR, KLOR-CON M20) 20 mEq tablet Take 20 mEq by mouth daily. azelastine (ASTELIN) 137 mcg (0.1 %) nasal spray 2 Sprays by Both Nostrils route as needed. Spiriva Respimat 2.5 mcg/actuation inhaler INHALE 2 SPRAY(S) BY MOUTH ONCE DAILY (Patient taking differently: Take 2 Puffs by inhalation daily.) 12 g 3    azithromycin (ZITHROMAX) 250 mg tablet TAKE 1 TABLET BY MOUTH ON MONDAY, WEDNESDAY AND FRIDAY 30 Tablet 0    insulin lispro (HUMALOG) 100 unit/mL kwikpen 6-12 Units.       polyethylene glycol (MIRALAX) 17 gram/dose powder Take 17 g by mouth as needed. furosemide (LASIX) 40 mg tablet Take 1 tablet by mouth twice daily (Patient taking differently: Take 40 mg by mouth two (2) times a day.) 60 Tablet 0    fluticasone propionate (FLONASE) 50 mcg/actuation nasal spray 2 Sprays by Both Nostrils route daily. 3 Each 3    simethicone (GAS-X) 125 mg capsule Take 125 mg by mouth four (4) times daily as needed for Flatulence. insulin glargine (LANTUS,BASAGLAR) 100 unit/mL (3 mL) inpn 65 Units by SubCUTAneous route nightly. And 5 units in the morning  Indications: type 2 diabetes mellitus      glucose blood VI test strips (FreeStyle Lite Strips) strip Use four times daily for blood sugar checks. Blood-Glucose Meter monitoring kit CHECK BLOOD SUGARS B.I.D. E11.9      lancets 28 gauge misc Use four times daily for blood sugar checks      Insulin Needles, Disposable, 31 gauge x 3/16\" ndle Use with Basaglar once daily. Insulin Syringe-Needle U-100 0.3 mL 31 gauge x 5/16\" syrg USE AS DIRECTED      acetaminophen (TYLENOL) 500 mg tablet Take 500 mg by mouth every six (6) hours as needed for Fever or Pain. albuterol (PROVENTIL HFA, VENTOLIN HFA, PROAIR HFA) 90 mcg/actuation inhaler Take 2 Puffs by inhalation every four (4) hours as needed for Wheezing. 1 Inhaler 0    omeprazole (PRILOSEC) 40 mg capsule Take 40 mg by mouth daily. lisinopril (PRINIVIL, ZESTRIL) 20 mg tablet Take 10 mg by mouth daily. OXYGEN-AIR DELIVERY SYSTEMS 2 L by Nasal route continuous. First Choice between 2L and 3L      aspirin delayed-release 81 mg tablet Take 81 mg by mouth daily. famotidine (PEPCID) 20 mg tablet Take 20 mg by mouth as needed for Heartburn or Gastroesophageal Reflux Disease (GERD). As needed for breakthrough heartburn      EPINEPHrine (EPIPEN) 0.3 mg/0.3 mL injection 0.3 mg by IntraMUSCular route once as needed.         ROS   (positive findings are in BOLD; negative findings are in regular font)  Constitutional: fevers, chills, appetite changes (decreased), weight changes, fatigue  HEENT: changes in vision, changes in hearing, sore throat, dysphagia  Cardiovascular: chest pain, palpitations, PND, orthopnea, edema  Pulmonary: SOB, cough, sputum production, rhinorrhea, wheezing, chest tightness  Gastrointestinal: abdominal pain, nausea/vomiting, diarrhea, constipation, melena, hematochezia  Genitourinary: dysuria, hesitation, dribbling, urgency, hematuria  Musculoskeletal: arthralgias, myalgias  Skin: rash, itching  Neurological: sensory changes, motor changes, headache  Psychiatric: mood changes  Endocrine: heat/cold intolerance  Heme: easy bruising/easy bleeding, LAD     OBJECTIVE:  Patient Vitals for the past 24 hrs:   BP Temp Pulse Resp SpO2   12/10/22 1845 (!) 179/72 -- 82 22 100 %   12/10/22 1522 (!) 167/70 -- 81 -- 98 %   12/10/22 1504 -- -- -- 30 --   12/10/22 1458 (!) 188/95 -- -- -- --   12/10/22 1457 -- 97.9 °F (36.6 °C) 85 -- 97 %     There is no height or weight on file to calculate BMI. PHYSICAL EXAM:  General: The patient appears well, some SOB with conversation without any acute distress. HEENT: NCAT, PERRLA, EOM intact; oral mucosa well perfused, oropharynx clear but mildly erythematous, boggy turbinates   Neck: JVD difficult to assess due to obesity  CVS: regular rate and rhythm, S1/S2 normal, 3/6 holosystolic murmur at R 2nd intercostal space  Lungs: diminished breath sounds bilaterally, trace rhonchi more prominent with expiration   Abdomen: Soft, non-distended, non-TTP, BS+, no organomegaly, no masses  Ext: No calf tenderness, peripheral pulses present, no significant edema.   Skin: Warm, Dry, Intact , No significant rashes/petechia/ecchymosis  Neuro: No focal neurologic deficits or gross abnormalities  Psych: A&Ox3, appropriate mood and affect     RECENT LABS AND IMAGING ON ADMISSION   Recent Results (from the past 24 hour(s))   CBC WITH AUTOMATED DIFF    Collection Time: 12/10/22  3:20 PM   Result Value Ref Range    WBC 5.9 4.6 - 13.2 K/uL    RBC 4.24 4.20 - 5.30 M/uL    HGB 12.2 12.0 - 16.0 g/dL    HCT 36.9 35.0 - 45.0 %    MCV 87.0 78.0 - 100.0 FL    MCH 28.8 24.0 - 34.0 PG    MCHC 33.1 31.0 - 37.0 g/dL    RDW 14.2 11.6 - 14.5 %    PLATELET 025 486 - 379 K/uL    MPV 8.5 (L) 9.2 - 11.8 FL    NRBC 0.0 0  WBC    ABSOLUTE NRBC 0.00 0.00 - 0.01 K/uL    NEUTROPHILS 66 40 - 73 %    LYMPHOCYTES 21 21 - 52 %    MONOCYTES 12 (H) 3 - 10 %    EOSINOPHILS 0 0 - 5 %    BASOPHILS 0 0 - 2 %    IMMATURE GRANULOCYTES 1 (H) 0.0 - 0.5 %    ABS. NEUTROPHILS 3.9 1.8 - 8.0 K/UL    ABS. LYMPHOCYTES 1.2 0.9 - 3.6 K/UL    ABS. MONOCYTES 0.7 0.05 - 1.2 K/UL    ABS. EOSINOPHILS 0.0 0.0 - 0.4 K/UL    ABS. BASOPHILS 0.0 0.0 - 0.1 K/UL    ABS. IMM. GRANS. 0.1 (H) 0.00 - 0.04 K/UL    DF AUTOMATED     METABOLIC PANEL, COMPREHENSIVE    Collection Time: 12/10/22  3:20 PM   Result Value Ref Range    Sodium 141 136 - 145 mmol/L    Potassium 3.8 3.5 - 5.5 mmol/L    Chloride 102 100 - 111 mmol/L    CO2 30 21 - 32 mmol/L    Anion gap 9 3.0 - 18 mmol/L    Glucose 166 (H) 74 - 99 mg/dL    BUN 15 7.0 - 18 MG/DL    Creatinine 0.92 0.6 - 1.3 MG/DL    BUN/Creatinine ratio 16 12 - 20      eGFR >60 >60 ml/min/1.73m2    Calcium 9.1 8.5 - 10.1 MG/DL    Bilirubin, total 0.7 0.2 - 1.0 MG/DL    ALT (SGPT) 39 13 - 56 U/L    AST (SGOT) 20 10 - 38 U/L    Alk.  phosphatase 98 45 - 117 U/L    Protein, total 7.5 6.4 - 8.2 g/dL    Albumin 3.7 3.4 - 5.0 g/dL    Globulin 3.8 2.0 - 4.0 g/dL    A-G Ratio 1.0 0.8 - 1.7     NT-PRO BNP    Collection Time: 12/10/22  3:20 PM   Result Value Ref Range    NT pro- 0 - 900 PG/ML   TROPONIN-HIGH SENSITIVITY    Collection Time: 12/10/22  3:20 PM   Result Value Ref Range    Troponin-High Sensitivity 14 0 - 54 ng/L   D DIMER    Collection Time: 12/10/22  3:20 PM   Result Value Ref Range    D DIMER 0.48 (H) <0.46 ug/ml(FEU)   COVID-19 WITH INFLUENZA A/B    Collection Time: 12/10/22  3:30 PM   Result Value Ref Range SARS-CoV-2 by PCR Detected (AA) NOTD      Influenza A by PCR Not detected NOTD      Influenza B by PCR Not detected NOTD     BLOOD GAS,LACTIC ACID, POC    Collection Time: 12/10/22  3:42 PM   Result Value Ref Range    Device: NASAL CANNULA      FIO2 (POC) 40 %    pH (POC) 7.43 7.35 - 7.45      pCO2 (POC) 43.5 35.0 - 45.0 MMHG    pO2 (POC) 113 (H) 80 - 100 MMHG    HCO3 (POC) 29.0 (H) 22 - 26 MMOL/L    sO2 (POC) 98.6 (H) 92 - 97 %    Base excess (POC) 3.9 mmol/L    Allens test (POC) Positive      Total resp. rate 26      Site RIGHT RADIAL      Patient temp. 97.9      Specimen type (POC) ARTERIAL      Performed by Sherry Lines     Lactic Acid (POC) 1.57 0.40 - 2.00 mmol/L        XR (Most Recent). Results from Hospital Encounter encounter on 12/10/22    XR CHEST PORT    Narrative  EXAM: Chest Radiograph    INDICATION:  sob    TECHNIQUE: AP view of the chest    COMPARISON: 2/7/2022, 6/28/2021, 3/30/2021    FINDINGS: No pneumothorax identified. Mild interstitial thickening is noted. This is unchanged and likely chronic. No effusions identified. The  cardiomediastinal silhouette is unremarkable. The pulmonary vasculature is  unremarkable. The osseous structures are unremarkable. Impression  1. No acute infiltrate or effusion. 2.  Mild chronic interstitial changes. CT (Most Recent) Results from Hospital Encounter encounter on 11/03/21    CT LOW DOSE LUNG CANCER SCREENING    Narrative  CT of the chest for lung cancer screening    HISTORY: Lung screening. COPD, tobacco use history, group D. Meet lung  screening criteria. COMPARISON: 11/2/20    TECHNIQUE: Low dose unenhanced multislice helical CT was performed from the  thoracic inlet to the adrenal glands without intravenous contrast  administration. Contiguous [1.25 mm] axial images were reconstructed and lung  and soft tissue windows were generated.  Coronal MIP and sagittal reformations  were generated.  -All CT scans at this facility performed using dose optimization techniques as  appreciated to a performed exam, to include automated exposure control,  adjustment of the mA and or KU according to patient size (including appropriate  matching for site specific examination), or use of iterative reconstruction  technique. FINDINGS:    The 3 mm nodule at lateral right upper lobe measures 2 mm today, #97/3. Mild  emphysema and chronic bronchitis appear stable. The lungs are clear of new  nodule, mass, airspace disease or edema. There is no pleural effusion. The absence of intravenous contrast reduces the sensitivity for evaluation of  the mediastinum and upper abdominal organs. No mediastinal or hilar adenopathy  appreciated. The heart is not enlarged. No pericardial effusion. Mild coronary  artery calcifications present. The aorta has a normal contour with no evidence  of aneurysm. The thyroid appears unremarkable. The bones and soft tissues of  the chest wall are within normal limits. The visualized portions of the upper  abdominal organs are normal.    Impression  1. The tiny right upper lobe nodule seems to be smaller, probably due to tiny  size and technical difference. No new nodule seen. 2. Moderate emphysema and bronchitis. LUNG RADS ASSESSMENT CATEGORIES:    Lung RADS 2 - Benign appearance or behavior. Management:  Continue annual screening with low dose CT in 12 months. Thank you for your referral.       ECHO No results found for this or any previous visit. EKG No results found for this or any previous visit. I have discussed Ms. Vicki Yancey case with my attending who agrees with the plan of care. Zheng Coleman DO , PGY-1   [facility]   December 10, 2022, 8:32 PM     [facility]  Senior Addendum to History and Physical    I have also seen and independently evaluated the patient. I agree with the plan as noted above. Additional HPI, A/P:     Vicki Yancey is a 60 y.o. female with PMHx of COPD, asthma, DM, HLD, CHF, CAD, SHERRIE, GERD, anxiety, arthritis, osteoporosis who presented to [facility] ED on 12/10/2022 with complaint of increased SOB and productive cough x5 days. In ED today patient tested positive for COVID-19, onset of symptoms began Tuesday and became progressively worse. Patient has significant pulmonary hx, COPD and asthma, follows closely with pulmonologist, Dr. Tom Morelos. This is her second time ruth COVID, she has never received a COVID vaccine and is against getting one. At baseline she is on 2-3L of oxygen via nasal canula at home. Physical exam findings in ED significant for diminished breath sounds with mild wheeze, regurgitant murmur noted over aortic area upon auscultation, no signs of fluid overload. Decision made to admit for further management. Will continue oxygen, start duo-nebs, pharm consult for remdesivir dosing, start dexamethasone 6mg q24h and trend inflammatory markers; encourage incentive spirometry in addition to proning as able. For her SHERRIE plans to have her daughter bring her in her Trelegy machine as she is intolerant of CPAP/BiPAP. In the event her breathing deteriorates will consult pulm, assistance appreciated. Vitals, labs and imaging reviewed     For additional problem list, assessment, and plan see intern note.     Janay Alvarez , PGY-2   [facility]   December 10, 2022, 8:32 PM

## 2022-12-11 NOTE — ED NOTES
Pt is back from bathroom. No complaints voiced at this time. RT came down to assist pt with cpap  Machine. Will continue to follow.

## 2022-12-11 NOTE — ED NOTES
Primary team at bedside, page resp to assist with home BIPAP machine. Report given to night sift RN to assume care.

## 2022-12-11 NOTE — PROGRESS NOTES
[facility].  DAILY PROGRESS NOTE      Patient:    Maida Herron , 60 y.o. female   MRN:  [id number]  Room/Bed:  ER18/18  Admission Date:   12/10/2022  Code status:  DNR    Reason for Admission: hypoxia, acute hypoxemic respiratory failure due to COVID-19    ASSESSMENT AND PLAN:   Problem List Items Addressed This Visit    None    COPD Exacerbation 2/2 Covid-19 Infection  H/o COPD-Asthma Cross-Over Syndrome  SHERRIE  -follows with pulmonology, Dr. Francisco Dickerson  -home meds: advair BID, albuterol Q4H PRN, azithromycin 250mg every Mon/Wed/Fri, montelukast 10 QHS, prednisone 5mg daily, spiriva daily, budesonide neb daily, duoneb Q4H PRN (at baseline takes BID), azelastine BID, flonase BID, Fasenra 30mg SubQ every 8 weeks (goes to [facility] for this)  -baseline O2: 2L at rest, 3L with exertion; PM Trilogy  -covid positive, flu negative  -CXR: no acute processes, mild chronic interstitial changes  -PE notable for decreased breath sounds bilaterally with trace rhonchi more prominent on expiration  Plan:  -continue decadrol 6mg PO daily, remdesivir as dosed by pharmacy  -continue supplemental O2 with goal SpO2 >88% (currently on 3L), PM Trilogy (pt's daughter brought her noninvasive breathing machine from home)  -continue home meds/inhalers/nebulizers  -incentive spirometry   -trend inflammatory markers - CRP, D-dimer, ferritin, procal     DM, Type 2  Gastroparesis  -home meds: lantus 5u in AM 65u in PM, lispro SS (if BG <100 no insulin, if 100-200 give 6u, if 201-300 give 8u, if >300 give 12u), miralax PRN, glycerin suppository PRN  -per patient last HbA1c 7.7% in Nov  Plan:  -continue lantus at home dosing  -POC glucose monitoring ACHS  -high dose correctional insulin given steroid administration   -hypoglycemia protocol in place      HTN  CHF  CAD  -home meds: ASA 81, lasix 40mg bid, lisinopril 10mg daily, KCL ER 20mEq  -no signs of fluid overload on exam or CXR  -had elevated BP on arrival bc did not take meds 2 days prior to admission. Home meds were restarted with BP starting to downtrend  Plan:  -continue ASA, lasix, lisinopril, KCL while admitted  -monitor vitals  -would caution against IVF given h/o CHF, encourage PO intake     GERD  -home meds: omeprazole 40mg daily, famotidine 20mg PRN for breakthrough heartburn  Plan:  -continue home meds while admitted        Global:    Point of Contact: Alana Nguyen (daughter) [phone]     Code: DNR/DNI  Diet: regular, advance as tolerated (poor PO intake leading up to admission)  DVT/AC: lovenox  Mobility: per protocol  Disposition: 2    Point of Contact (relationship):     SUBJECTIVE:   Events of the last 24 hours:  No acute events overnight. Patient seen at beside. No acute complaints. States she feels much better. She rated her SOB as 8/10 on admission, now rates it as 3/10 with baseline being 2/10. Patient had great strength and sat up in bed with no SOB no exertion.     CURRENT INPATIENT MEDICATIONS:  Current Facility-Administered Medications   Medication Dose Route Frequency Provider Last Rate Last Admin    sodium chloride (NS) flush 5-40 mL  5-40 mL IntraVENous PRN Evon Iqbal DO        polyethylene glycol (MIRALAX) packet 17 g  17 g Oral DAILY PRN Shashank Andrea DO        enoxaparin (LOVENOX) injection 40 mg  40 mg SubCUTAneous DAILY Evon Iqbal DO        dexAMETHasone (DECADRON) tablet 6 mg  6 mg Oral DAILY Evon Iqbal DO        acetaminophen (TYLENOL) tablet 500 mg  500 mg Oral Q6H PRN Shashank Andrea DO        aspirin delayed-release tablet 81 mg  81 mg Oral DAILY Evon Iqbal DO        azelastine (ASTELIN) 137mcg/spray nasal spray  2 Spray Both Nostrils DAILY PRN Shashank Andrea DO        fluticasone propionate (FLONASE) 50 mcg/actuation nasal spray 2 Spray  2 Spray Both Nostrils DAILY Evon Iqbal DO        furosemide (LASIX) tablet 40 mg  40 mg Oral BID Shashank Andrea DO   40 mg at 12/10/22 2225    insulin glargine (LANTUS) injection 65 Units  65 Units SubCUTAneous QHS Lancerin Romp, DO   65 Units at 12/10/22 2240    montelukast (SINGULAIR) tablet 10 mg  10 mg Oral QHS Karrin Romp, DO   10 mg at 12/10/22 2225    pantoprazole (PROTONIX) tablet 40 mg  40 mg Oral ACB Evon Iqbal,         potassium chloride (K-DUR, KLOR-CON M20) SR tablet 20 mEq  20 mEq Oral DAILY Lilia Romp, DO        tiotropium bromide (SPIRIVA RESPIMAT) 2.5 mcg /actuation  2 Puff Inhalation QAM RT Evon Iqbal DO        insulin glargine (LANTUS) injection 5 Units  5 Units SubCUTAneous DAILY Evon Iqbal DO        insulin lispro (HUMALOG) injection   SubCUTAneous AC&HS Lilia Arroyo, DO   9 Units at 12/10/22 2240    glucose chewable tablet 16 g  16 g Oral PRN Lilia Arroyo, DO        glucagon (GLUCAGEN) injection 1 mg  1 mg IntraMUSCular PRN Evon Iqbal DO        dextrose 10% infusion 0-250 mL  0-250 mL IntraVENous PRN Evon Iqbal DO        remdesivir 100 mg in 0.9% sodium chloride 250 mL IVPB  100 mg IntraVENous Q24H Missy GUTIERREZ MD        budesonide-formoteroL [facility]) 160-4.5 mcg/actuation HFA inhaler 2 Puff  2 Puff Inhalation BID RT Lilia Arroyo, DO   2 Puff at 12/10/22 2225    ipratropium-albuterol (COMBIVENT RESPIMAT) 20 mcg-100 mcg inhalation spray  2 Puff Inhalation Q4H PRN Lilia Arroyo, DO        simethicone (MYLICON) tablet 80 mg  80 mg Oral QID PRN Lilia Romp, DO        [START ON 12/12/2022] azithromycin (ZITHROMAX) tablet 250 mg  250 mg Oral Q MON, WED & FRI Evon Iqbal,         famotidine (PEPCID) tablet 20 mg  20 mg Oral DAILY PRN Lilia Moyp, DO        lisinopriL (PRINIVIL, ZESTRIL) tablet 10 mg  10 mg Oral DAILY Missy GUTIERREZ MD   10 mg at 12/10/22 2325     Current Outpatient Medications   Medication Sig Dispense Refill    albuterol-ipratropium (DUO-NEB) 2.5 mg-0.5 mg/3 ml nebu 3 mL by Nebulization route every four (4) hours as needed for Wheezing. predniSONE (DELTASONE) 5 mg tablet Take 5 mg by mouth daily. montelukast (SINGULAIR) 10 mg tablet Take 1 tablet by mouth nightly 90 Tablet 0    fluticasone propion-salmeteroL (Advair HFA) 230-21 mcg/actuation inhaler Take 2 Puffs by inhalation two (2) times a day. Rinse and gargle after each use. Disp: 3 month 3 Each 3    budesonide (PULMICORT) 1 mg/2 mL nbsp Take 2 mL by inhalation daily. 90 Each 3    cholecalciferol (VITAMIN D3) (2,000 UNITS /50 MCG) cap capsule Take 5,000 Units by mouth every seven (7) days. potassium chloride (K-DUR, KLOR-CON M20) 20 mEq tablet Take 20 mEq by mouth daily. azelastine (ASTELIN) 137 mcg (0.1 %) nasal spray 2 Sprays by Both Nostrils route as needed. Spiriva Respimat 2.5 mcg/actuation inhaler INHALE 2 SPRAY(S) BY MOUTH ONCE DAILY (Patient taking differently: Take 2 Puffs by inhalation daily.) 12 g 3    azithromycin (ZITHROMAX) 250 mg tablet TAKE 1 TABLET BY MOUTH ON MONDAY, WEDNESDAY AND FRIDAY (Patient taking differently: Take 250 mg by mouth DIALYSIS MON, WED & FRI. ) 30 Tablet 0    insulin lispro (HUMALOG) 100 unit/mL kwikpen 6-12 Units. polyethylene glycol (MIRALAX) 17 gram/dose powder Take 17 g by mouth as needed. furosemide (LASIX) 40 mg tablet Take 1 tablet by mouth twice daily (Patient taking differently: Take 40 mg by mouth two (2) times a day.) 60 Tablet 0    fluticasone propionate (FLONASE) 50 mcg/actuation nasal spray 2 Sprays by Both Nostrils route daily. 3 Each 3    simethicone (GAS-X) 125 mg capsule Take 125 mg by mouth four (4) times daily as needed for Flatulence. insulin glargine (LANTUS,BASAGLAR) 100 unit/mL (3 mL) inpn 65 Units by SubCUTAneous route nightly. And 5 units in the morning  Indications: type 2 diabetes mellitus      glucose blood VI test strips (FreeStyle Lite Strips) strip Use four times daily for blood sugar checks.       Blood-Glucose Meter monitoring kit CHECK BLOOD SUGARS B.I.D. E11.9      lancets 28 gauge misc Use four times daily for blood sugar checks      Insulin Needles, Disposable, 31 gauge x 3/16\" ndle Use with Basaglar once daily. Insulin Syringe-Needle U-100 0.3 mL 31 gauge x 5/16\" syrg USE AS DIRECTED      acetaminophen (TYLENOL) 500 mg tablet Take 500 mg by mouth every six (6) hours as needed for Fever or Pain. albuterol (PROVENTIL HFA, VENTOLIN HFA, PROAIR HFA) 90 mcg/actuation inhaler Take 2 Puffs by inhalation every four (4) hours as needed for Wheezing. 1 Inhaler 0    omeprazole (PRILOSEC) 40 mg capsule Take 40 mg by mouth daily. lisinopril (PRINIVIL, ZESTRIL) 20 mg tablet Take 10 mg by mouth daily. OXYGEN-AIR DELIVERY SYSTEMS 2 L by Nasal route continuous. First Choice between 2L and 3L      aspirin delayed-release 81 mg tablet Take 81 mg by mouth daily. famotidine (PEPCID) 20 mg tablet Take 20 mg by mouth as needed for Heartburn or Gastroesophageal Reflux Disease (GERD). As needed for breakthrough heartburn      EPINEPHrine (EPIPEN) 0.3 mg/0.3 mL injection 0.3 mg by IntraMUSCular route once as needed. Allergies  Allergies   Allergen Reactions    Ancef [Cefazolin] Hives    Contrast Agent [Iodine] Anaphylaxis, Shortness of Breath and Swelling     Needs pre-medication for IV contrast with Benadryl, Solu-Medrol    Fish Containing Products Anaphylaxis     Pt states she had a severe allergic reaction at 7 y/o. Statins-Hmg-Coa Reductase Inhibitors Myalgia    Metformin Other (comments)     Abdominal pain, diarrhea.     Codeine Other (comments)     Altered mental status       OBJECTIVE:    Intake/Output Summary (Last 24 hours) at 12/11/2022 0808  Last data filed at 12/10/2022 2255  Gross per 24 hour   Intake 300 ml   Output --   Net 300 ml       Visit Vitals  BP (!) 151/83   Pulse 73   Temp 97.9 °F (36.6 °C)   Resp 29   SpO2 94%       PHYSICAL EXAM  Gen: NAD, comfortable, obese   HEENT: normocephalic, atraumatic, MMM, no thyromegaly, no JVD  CV: RRR, S1/S2 present 3/6 holosystolic murmur at R 2nd intercostal space, +2 radial pulses, well-perfused, PMI not displaced  Pulm: CTAB, no wheezes, no crackles  Abd: S/NT/ND, no rebound, no guarding, no hepatosplenomegaly   MSK: no clubbing, no edema  Skin: warm, dry, intact, no rash  Neuro: CN II-XII grossly intact, no focal deficits appreciated   Psych: appropriate, alert, oriented x3    LABWORK (LAST 24 HOURS)  Recent Results (from the past 24 hour(s))   EKG, 12 LEAD, INITIAL    Collection Time: 12/10/22  3:00 PM   Result Value Ref Range    Ventricular Rate 81 BPM    Atrial Rate 81 BPM    P-R Interval 140 ms    QRS Duration 130 ms    Q-T Interval 398 ms    QTC Calculation (Bezet) 462 ms    Calculated P Axis 60 degrees    Calculated R Axis 52 degrees    Calculated T Axis 10 degrees    Diagnosis       Normal sinus rhythm  Right bundle branch block  Abnormal ECG  When compared with ECG of 07-FEB-2022 16:11,  No significant change was found  Confirmed by Annie Duran ([id number]) on 12/11/2022 7:01:03 AM     CBC WITH AUTOMATED DIFF    Collection Time: 12/10/22  3:20 PM   Result Value Ref Range    WBC 5.9 4.6 - 13.2 K/uL    RBC 4.24 4.20 - 5.30 M/uL    HGB 12.2 12.0 - 16.0 g/dL    HCT 36.9 35.0 - 45.0 %    MCV 87.0 78.0 - 100.0 FL    MCH 28.8 24.0 - 34.0 PG    MCHC 33.1 31.0 - 37.0 g/dL    RDW 14.2 11.6 - 14.5 %    PLATELET 569 442 - 637 K/uL    MPV 8.5 (L) 9.2 - 11.8 FL    NRBC 0.0 0  WBC    ABSOLUTE NRBC 0.00 0.00 - 0.01 K/uL    NEUTROPHILS 66 40 - 73 %    LYMPHOCYTES 21 21 - 52 %    MONOCYTES 12 (H) 3 - 10 %    EOSINOPHILS 0 0 - 5 %    BASOPHILS 0 0 - 2 %    IMMATURE GRANULOCYTES 1 (H) 0.0 - 0.5 %    ABS. NEUTROPHILS 3.9 1.8 - 8.0 K/UL    ABS. LYMPHOCYTES 1.2 0.9 - 3.6 K/UL    ABS. MONOCYTES 0.7 0.05 - 1.2 K/UL    ABS. EOSINOPHILS 0.0 0.0 - 0.4 K/UL    ABS. BASOPHILS 0.0 0.0 - 0.1 K/UL    ABS. IMM.  GRANS. 0.1 (H) 0.00 - 0.04 K/UL    DF AUTOMATED     METABOLIC PANEL, COMPREHENSIVE Collection Time: 12/10/22  3:20 PM   Result Value Ref Range    Sodium 141 136 - 145 mmol/L    Potassium 3.8 3.5 - 5.5 mmol/L    Chloride 102 100 - 111 mmol/L    CO2 30 21 - 32 mmol/L    Anion gap 9 3.0 - 18 mmol/L    Glucose 166 (H) 74 - 99 mg/dL    BUN 15 7.0 - 18 MG/DL    Creatinine 0.92 0.6 - 1.3 MG/DL    BUN/Creatinine ratio 16 12 - 20      eGFR >60 >60 ml/min/1.73m2    Calcium 9.1 8.5 - 10.1 MG/DL    Bilirubin, total 0.7 0.2 - 1.0 MG/DL    ALT (SGPT) 39 13 - 56 U/L    AST (SGOT) 20 10 - 38 U/L    Alk. phosphatase 98 45 - 117 U/L    Protein, total 7.5 6.4 - 8.2 g/dL    Albumin 3.7 3.4 - 5.0 g/dL    Globulin 3.8 2.0 - 4.0 g/dL    A-G Ratio 1.0 0.8 - 1.7     NT-PRO BNP    Collection Time: 12/10/22  3:20 PM   Result Value Ref Range    NT pro- 0 - 900 PG/ML   TROPONIN-HIGH SENSITIVITY    Collection Time: 12/10/22  3:20 PM   Result Value Ref Range    Troponin-High Sensitivity 14 0 - 54 ng/L   D DIMER    Collection Time: 12/10/22  3:20 PM   Result Value Ref Range    D DIMER 0.48 (H) <0.46 ug/ml(FEU)   COVID-19 WITH INFLUENZA A/B    Collection Time: 12/10/22  3:30 PM   Result Value Ref Range    SARS-CoV-2 by PCR Detected (AA) NOTD      Influenza A by PCR Not detected NOTD      Influenza B by PCR Not detected NOTD     BLOOD GAS,LACTIC ACID, POC    Collection Time: 12/10/22  3:42 PM   Result Value Ref Range    Device: NASAL CANNULA      FIO2 (POC) 40 %    pH (POC) 7.43 7.35 - 7.45      pCO2 (POC) 43.5 35.0 - 45.0 MMHG    pO2 (POC) 113 (H) 80 - 100 MMHG    HCO3 (POC) 29.0 (H) 22 - 26 MMOL/L    sO2 (POC) 98.6 (H) 92 - 97 %    Base excess (POC) 3.9 mmol/L    Allens test (POC) Positive      Total resp. rate 26      Site RIGHT RADIAL      Patient temp.  97.9      Specimen type (POC) ARTERIAL      Performed by Rehana Turk     Lactic Acid (POC) 1.57 0.40 - 2.00 mmol/L   GLUCOSE, POC    Collection Time: 12/10/22 10:31 PM   Result Value Ref Range    Glucose (POC) 261 (H) 70 - 809 mg/dL   METABOLIC PANEL, BASIC    Collection Time: 12/11/22  3:46 AM   Result Value Ref Range    Sodium 137 136 - 145 mmol/L    Potassium 4.7 3.5 - 5.5 mmol/L    Chloride 102 100 - 111 mmol/L    CO2 27 21 - 32 mmol/L    Anion gap 8 3.0 - 18 mmol/L    Glucose 301 (H) 74 - 99 mg/dL    BUN 16 7.0 - 18 MG/DL    Creatinine 1.01 0.6 - 1.3 MG/DL    BUN/Creatinine ratio 16 12 - 20      eGFR >60 >60 ml/min/1.73m2    Calcium 8.9 8.5 - 10.1 MG/DL   CBC WITH AUTOMATED DIFF    Collection Time: 12/11/22  3:46 AM   Result Value Ref Range    WBC 5.8 4.6 - 13.2 K/uL    RBC 3.99 (L) 4.20 - 5.30 M/uL    HGB 11.4 (L) 12.0 - 16.0 g/dL    HCT 33.7 (L) 35.0 - 45.0 %    MCV 84.5 78.0 - 100.0 FL    MCH 28.6 24.0 - 34.0 PG    MCHC 33.8 31.0 - 37.0 g/dL    RDW 13.9 11.6 - 14.5 %    PLATELET 314 014 - 277 K/uL    MPV 8.7 (L) 9.2 - 11.8 FL    NRBC 0.0 0  WBC    ABSOLUTE NRBC 0.00 0.00 - 0.01 K/uL    NEUTROPHILS 86 (H) 40 - 73 %    LYMPHOCYTES 11 (L) 21 - 52 %    MONOCYTES 2 (L) 3 - 10 %    EOSINOPHILS 0 0 - 5 %    BASOPHILS 0 0 - 2 %    IMMATURE GRANULOCYTES 1 (H) 0.0 - 0.5 %    ABS. NEUTROPHILS 4.9 1.8 - 8.0 K/UL    ABS. LYMPHOCYTES 0.6 (L) 0.9 - 3.6 K/UL    ABS. MONOCYTES 0.1 0.05 - 1.2 K/UL    ABS. EOSINOPHILS 0.0 0.0 - 0.4 K/UL    ABS. BASOPHILS 0.0 0.0 - 0.1 K/UL    ABS. IMM. GRANS. 0.1 (H) 0.00 - 0.04 K/UL    DF AUTOMATED     D DIMER    Collection Time: 12/11/22  3:46 AM   Result Value Ref Range    D DIMER 0.41 <0.46 ug/ml(FEU)   PROCALCITONIN    Collection Time: 12/11/22  3:46 AM   Result Value Ref Range    Procalcitonin <0.05 ng/mL   FERRITIN    Collection Time: 12/11/22  3:46 AM   Result Value Ref Range    Ferritin 20 8 - 388 NG/ML   MAGNESIUM    Collection Time: 12/11/22  3:46 AM   Result Value Ref Range    Magnesium 2.4 1.6 - 2.6 mg/dL       IMAGING AND PROCEDURES (LAST 36 HOURS)  XR CHEST PORT    Result Date: 12/10/2022  1. No acute infiltrate or effusion. 2.  Mild chronic interstitial changes. ================================================================  Further management for Ms. Banks  will be discussed on rounds with my attending.       Deonte Matias MD, PGY-1  [facility]  December 11, 2022 7:55 AM

## 2022-12-12 ENCOUNTER — APPOINTMENT (OUTPATIENT)
Dept: PULMONOLOGY | Age: 62
End: 2022-12-12

## 2022-12-12 PROBLEM — E11.43 DIABETES MELLITUS WITH GASTROPARESIS (HCC): Status: ACTIVE | Noted: 2018-05-30

## 2022-12-12 LAB
ANION GAP SERPL CALC-SCNC: 11 MMOL/L (ref 3–18)
BASOPHILS # BLD: 0 K/UL (ref 0–0.1)
BASOPHILS NFR BLD: 0 % (ref 0–2)
BUN SERPL-MCNC: 24 MG/DL (ref 7–18)
BUN/CREAT SERPL: 23 (ref 12–20)
CALCIUM SERPL-MCNC: 9.7 MG/DL (ref 8.5–10.1)
CHLORIDE SERPL-SCNC: 99 MMOL/L (ref 100–111)
CO2 SERPL-SCNC: 27 MMOL/L (ref 21–32)
CREAT SERPL-MCNC: 1.05 MG/DL (ref 0.6–1.3)
D DIMER PPP FEU-MCNC: 0.4 UG/ML(FEU)
DIFFERENTIAL METHOD BLD: ABNORMAL
EOSINOPHIL # BLD: 0 K/UL (ref 0–0.4)
EOSINOPHIL NFR BLD: 1 % (ref 0–5)
ERYTHROCYTE [DISTWIDTH] IN BLOOD BY AUTOMATED COUNT: 14 % (ref 11.6–14.5)
FERRITIN SERPL-MCNC: 17 NG/ML (ref 8–388)
GLUCOSE BLD STRIP.AUTO-MCNC: 185 MG/DL (ref 70–110)
GLUCOSE BLD STRIP.AUTO-MCNC: 204 MG/DL (ref 70–110)
GLUCOSE BLD STRIP.AUTO-MCNC: 210 MG/DL (ref 70–110)
GLUCOSE BLD STRIP.AUTO-MCNC: 264 MG/DL (ref 70–110)
GLUCOSE SERPL-MCNC: 281 MG/DL (ref 74–99)
HCT VFR BLD AUTO: 35.6 % (ref 35–45)
HGB BLD-MCNC: 11.9 G/DL (ref 12–16)
IMM GRANULOCYTES # BLD AUTO: 0.1 K/UL (ref 0–0.04)
IMM GRANULOCYTES NFR BLD AUTO: 1 % (ref 0–0.5)
LYMPHOCYTES # BLD: 1.3 K/UL (ref 0.9–3.6)
LYMPHOCYTES NFR BLD: 16 % (ref 21–52)
MCH RBC QN AUTO: 28.5 PG (ref 24–34)
MCHC RBC AUTO-ENTMCNC: 33.4 G/DL (ref 31–37)
MCV RBC AUTO: 85.4 FL (ref 78–100)
MONOCYTES # BLD: 0.6 K/UL (ref 0.05–1.2)
MONOCYTES NFR BLD: 7 % (ref 3–10)
NEUTS SEG # BLD: 6 K/UL (ref 1.8–8)
NEUTS SEG NFR BLD: 75 % (ref 40–73)
NRBC # BLD: 0 K/UL (ref 0–0.01)
NRBC BLD-RTO: 0 PER 100 WBC
PLATELET # BLD AUTO: 328 K/UL (ref 135–420)
PMV BLD AUTO: 8.9 FL (ref 9.2–11.8)
POTASSIUM SERPL-SCNC: 4.6 MMOL/L (ref 3.5–5.5)
RBC # BLD AUTO: 4.17 M/UL (ref 4.2–5.3)
SODIUM SERPL-SCNC: 137 MMOL/L (ref 136–145)
WBC # BLD AUTO: 7.9 K/UL (ref 4.6–13.2)

## 2022-12-12 PROCEDURE — 74011250637 HC RX REV CODE- 250/637: Performed by: STUDENT IN AN ORGANIZED HEALTH CARE EDUCATION/TRAINING PROGRAM

## 2022-12-12 PROCEDURE — 82962 GLUCOSE BLOOD TEST: CPT

## 2022-12-12 PROCEDURE — 65270000029 HC RM PRIVATE

## 2022-12-12 PROCEDURE — 74011250636 HC RX REV CODE- 250/636: Performed by: FAMILY MEDICINE

## 2022-12-12 PROCEDURE — 36415 COLL VENOUS BLD VENIPUNCTURE: CPT

## 2022-12-12 PROCEDURE — 74011636637 HC RX REV CODE- 636/637

## 2022-12-12 PROCEDURE — 74011000258 HC RX REV CODE- 258: Performed by: STUDENT IN AN ORGANIZED HEALTH CARE EDUCATION/TRAINING PROGRAM

## 2022-12-12 PROCEDURE — 80048 BASIC METABOLIC PNL TOTAL CA: CPT

## 2022-12-12 PROCEDURE — 74011000250 HC RX REV CODE- 250

## 2022-12-12 PROCEDURE — 74011250637 HC RX REV CODE- 250/637

## 2022-12-12 PROCEDURE — 85025 COMPLETE CBC W/AUTO DIFF WBC: CPT

## 2022-12-12 PROCEDURE — 74011250636 HC RX REV CODE- 250/636

## 2022-12-12 PROCEDURE — 85379 FIBRIN DEGRADATION QUANT: CPT

## 2022-12-12 PROCEDURE — 74011250636 HC RX REV CODE- 250/636: Performed by: STUDENT IN AN ORGANIZED HEALTH CARE EDUCATION/TRAINING PROGRAM

## 2022-12-12 PROCEDURE — 82728 ASSAY OF FERRITIN: CPT

## 2022-12-12 RX ORDER — ENOXAPARIN SODIUM 100 MG/ML
30 INJECTION SUBCUTANEOUS EVERY 12 HOURS
Status: DISCONTINUED | OUTPATIENT
Start: 2022-12-12 | End: 2022-12-13 | Stop reason: HOSPADM

## 2022-12-12 RX ADMIN — ASPIRIN 81 MG: 81 TABLET, COATED ORAL at 09:25

## 2022-12-12 RX ADMIN — REMDESIVIR 100 MG: 100 INJECTION, POWDER, LYOPHILIZED, FOR SOLUTION INTRAVENOUS at 22:37

## 2022-12-12 RX ADMIN — ENOXAPARIN SODIUM 30 MG: 100 INJECTION SUBCUTANEOUS at 21:28

## 2022-12-12 RX ADMIN — Medication 3 UNITS: at 11:56

## 2022-12-12 RX ADMIN — POTASSIUM CHLORIDE 20 MEQ: 1500 TABLET, EXTENDED RELEASE ORAL at 09:25

## 2022-12-12 RX ADMIN — FUROSEMIDE 40 MG: 40 TABLET ORAL at 09:25

## 2022-12-12 RX ADMIN — TIOTROPIUM BROMIDE INHALATION SPRAY 2 PUFF: 3.12 SPRAY, METERED RESPIRATORY (INHALATION) at 09:38

## 2022-12-12 RX ADMIN — SODIUM CHLORIDE, PRESERVATIVE FREE 10 ML: 5 INJECTION INTRAVENOUS at 21:28

## 2022-12-12 RX ADMIN — PANTOPRAZOLE SODIUM 40 MG: 40 TABLET, DELAYED RELEASE ORAL at 09:25

## 2022-12-12 RX ADMIN — AZITHROMYCIN MONOHYDRATE 250 MG: 250 TABLET ORAL at 21:28

## 2022-12-12 RX ADMIN — ENOXAPARIN SODIUM 40 MG: 100 INJECTION SUBCUTANEOUS at 09:25

## 2022-12-12 RX ADMIN — MONTELUKAST 10 MG: 10 TABLET, FILM COATED ORAL at 21:28

## 2022-12-12 RX ADMIN — FUROSEMIDE 40 MG: 40 TABLET ORAL at 17:40

## 2022-12-12 RX ADMIN — Medication 9 UNITS: at 17:40

## 2022-12-12 RX ADMIN — FLUTICASONE PROPIONATE 2 SPRAY: 50 SPRAY, METERED NASAL at 09:37

## 2022-12-12 RX ADMIN — Medication 6 UNITS: at 06:51

## 2022-12-12 RX ADMIN — Medication 6 UNITS: at 21:34

## 2022-12-12 RX ADMIN — Medication 5 UNITS: at 09:25

## 2022-12-12 RX ADMIN — SODIUM CHLORIDE, PRESERVATIVE FREE 10 ML: 5 INJECTION INTRAVENOUS at 06:31

## 2022-12-12 RX ADMIN — BUDESONIDE AND FORMOTEROL FUMARATE DIHYDRATE 2 PUFF: 160; 4.5 AEROSOL RESPIRATORY (INHALATION) at 21:30

## 2022-12-12 RX ADMIN — DEXAMETHASONE 6 MG: 2 TABLET ORAL at 09:25

## 2022-12-12 RX ADMIN — ACETAMINOPHEN 500 MG: 500 TABLET ORAL at 17:40

## 2022-12-12 RX ADMIN — INSULIN GLARGINE 65 UNITS: 100 INJECTION, SOLUTION SUBCUTANEOUS at 21:34

## 2022-12-12 RX ADMIN — IPRATROPIUM BROMIDE AND ALBUTEROL 2 PUFF: 20; 100 SPRAY, METERED RESPIRATORY (INHALATION) at 09:37

## 2022-12-12 RX ADMIN — BUDESONIDE AND FORMOTEROL FUMARATE DIHYDRATE 2 PUFF: 160; 4.5 AEROSOL RESPIRATORY (INHALATION) at 09:37

## 2022-12-12 RX ADMIN — LISINOPRIL 10 MG: 10 TABLET ORAL at 09:25

## 2022-12-12 NOTE — PROGRESS NOTES
[facility].  DAILY PROGRESS NOTE      Patient:    Geronimo Hadley , 60 y.o. female   MRN:  [id number]  Room/Bed:  412/01  Admission Date:   12/10/2022  Code status:  DNR    Reason for Admission: hypoxia, acute hypoxemic respiratory failure due to COVID-19    ASSESSMENT AND PLAN:   Problem List Items Addressed This Visit    None  Visit Diagnoses       Acute respiratory failure due to COVID-19 [facility])    -  Primary          Ms. Blayne Valderrama is a 60 y.o. female with medical co-morbidities including COPD, asthma, DM, HLD, CHF, CAD, SHERRIE, GERD, anxiety, arthritis, osteoporosis who presented to [facility] ED on 12/10/2022 with complaint of increased SOB and productive cough x5 days and positive test for COVID-19 on admission. Now with incremental improvement of her respiratory status on dexamethasone and IV remdesivir.      COPD Exacerbation 2/2 Covid-19 Infection  H/o COPD-Asthma Cross-Over Syndrome  SHERRIE  -follows with pulmonology, Dr. Edna Mauro  -home meds: advair BID, albuterol Q4H PRN, azithromycin 250mg every Mon/Wed/Fri, montelukast 10 QHS, prednisone 5mg daily, spiriva daily, budesonide neb daily, duoneb Q4H PRN (at baseline takes BID), azelastine BID, flonase BID, Fasenra 30mg SubQ every 8 weeks (goes to [facility] for this)  -baseline O2: 2L at rest, 3L with exertion; PM Trilogy  -covid positive, flu negative  -CXR on 12/10: no acute processes, mild chronic interstitial changes  -Physical exam notable for decreased breath sounds bilaterally with trace rhonchi more prominent on expiration  Plan:  - Dexamethasone 6mg PO daily  - Remdesivir as dosed by pharmacy  - Inhalers rather than nebulizer to reduce aerosolization    [] Budesonide-formoterol 160-4.5, two puffs twice daily    [] Tiotropium 2.5, two puffs twice daily    [] Ipratropium-albuterol 20-10 two puffs Q4H as needed  -Montelukast 10 mg p.o. daily  -Continue home azithromycin  -continue supplemental O2 with goal SpO2 >88% (currently on 28), PM Trilogy (pt's daughter brought her noninvasive breathing machine from home)  -incentive spirometry   -trend inflammatory markers - CRP, D-dimer, ferritin, procal     DM, Type 2  Gastroparesis  -home meds: lantus 5u in AM 65u in PM, lispro SS (if BG <100 no insulin, if 100-200 give 6u, if 201-300 give 8u, if >300 give 12u), miralax PRN, glycerin suppository PRN  -per patient last HbA1c 7.7% in Nov  Plan:  -continue lantus at home dosing  -POC glucose monitoring ACHS  -high dose correctional insulin given steroid administration   -hypoglycemia protocol in place      HTN  CHF  CAD  -home meds: ASA 81, lasix 40mg bid, lisinopril 10mg daily, KCL ER 20mEq  -no signs of fluid overload on exam or CXR  Plan:  -continue ASA, lasix, lisinopril, KCL while admitted  -would caution against IVF given h/o CHF, encourage PO intake     GERD  -home meds: omeprazole 40mg daily, famotidine 20mg PRN for breakthrough heartburn  Plan:  - Pantoprazole 40mg daily while inpatient     Global:    Point of Contact: Shilpa Marin (daughter) [phone]     Code: DNR/DNI  Diet: regular, advance as tolerated (poor PO intake leading up to admission)  DVT/AC: lovenox  Mobility: per protocol  Disposition: pending course    SUBJECTIVE:   Events of the last 24 hours:  No acute events overnight. Ms. Edda Quiroga reports that she is feeling much better with current treatment compared to when she was initially admitted to the hospital, however still does not feel like she is at her baseline. I had a long discussion about her medical history and she demonstrates excellent health literacy and knowledge of her medications. Denies any new concerns. After she was woken up for the exam, she independently switched her Trilogy breathing device to nasal cannula, which was flowing at 2 L/min which is the same as her home oxygen.   Reports she now has enough strength to make it to the bathroom independently as evidence of incremental improvement.     ROS:  As above    CURRENT INPATIENT MEDICATIONS:  Current Facility-Administered Medications   Medication Dose Route Frequency Provider Last Rate Last Admin    sodium chloride (NS) flush 5-40 mL  5-40 mL IntraVENous PRN Evon Iqbal DO        polyethylene glycol (MIRALAX) packet 17 g  17 g Oral DAILY PRN Jeanine Mcpherson DO        enoxaparin (LOVENOX) injection 40 mg  40 mg SubCUTAneous DAILY Evon Iqbal DO   40 mg at 12/11/22 0948    dexAMETHasone (DECADRON) tablet 6 mg  6 mg Oral DAILY Jeanine Mcpherson DO   6 mg at 12/11/22 8228    acetaminophen (TYLENOL) tablet 500 mg  500 mg Oral Q6H PRN Jeanine Mcpherson DO        aspirin delayed-release tablet 81 mg  81 mg Oral DAILY Jeanine Mcpherson DO   81 mg at 12/11/22 0948    azelastine (ASTELIN) 137mcg/spray nasal spray  2 Spray Both Nostrils DAILY PRN Jeanine Mcpherson DO        fluticasone propionate (FLONASE) 50 mcg/actuation nasal spray 2 Spray  2 Spray Both Nostrils DAILY Evon Iqbal DO   2 Spray at 12/11/22 0958    furosemide (LASIX) tablet 40 mg  40 mg Oral BID Jeanine Mcpherson DO   40 mg at 12/11/22 1758    insulin glargine (LANTUS) injection 65 Units  65 Units SubCUTAneous QHS Evon Iqbal, DO   65 Units at 12/11/22 2226    montelukast (SINGULAIR) tablet 10 mg  10 mg Oral QHS Jeanine Mcpherson DO   10 mg at 12/11/22 2233    pantoprazole (PROTONIX) tablet 40 mg  40 mg Oral ACB Jeanine Mcpherson DO   40 mg at 12/11/22 0948    potassium chloride (K-DUR, KLOR-CON M20) SR tablet 20 mEq  20 mEq Oral DAILY Jeanine Mcpherson DO   20 mEq at 12/11/22 0948    tiotropium bromide (SPIRIVA RESPIMAT) 2.5 mcg /actuation  2 Puff Inhalation QAM RT Jeanine Mcpherson DO   2 Puff at 12/11/22 0959    insulin glargine (LANTUS) injection 5 Units  5 Units SubCUTAneous DAILY Jeanine Mcpherson DO   5 Units at 12/11/22 0956    insulin lispro (HUMALOG) injection   SubCUTAneous AC&HS Jeanine Mcpherson DO   6 Units at 12/11/22 8686 glucose chewable tablet 16 g  16 g Oral PRN Ketty Salcido, DO        glucagon (GLUCAGEN) injection 1 mg  1 mg IntraMUSCular PRN Evon Iqbal,         dextrose 10% infusion 0-250 mL  0-250 mL IntraVENous PRN Ketty Salcido DO        remdesivir 100 mg in 0.9% sodium chloride 250 mL IVPB  100 mg IntraVENous Q24H Pierce Prader, Whitney D,  mL/hr at 12/11/22 2227 100 mg at 12/11/22 2227    budesonide-formoteroL (SYMBICORT) 160-4.5 mcg/actuation HFA inhaler 2 Puff  2 Puff Inhalation BID RT Ketty Loly, DO   2 Puff at 12/11/22 2227    ipratropium-albuterol (COMBIVENT RESPIMAT) 20 mcg-100 mcg inhalation spray  2 Puff Inhalation Q4H PRN Ketty Loly, DO   2 Puff at 12/11/22 1758    simethicone (MYLICON) tablet 80 mg  80 mg Oral QID PRN Ketty Salcido,         azithromycin [facility]) tablet 250 mg  250 mg Oral Q MON, WED & FRI Evon Iqbal DO        famotidine (PEPCID) tablet 20 mg  20 mg Oral DAILY PRN Ketty Salcido DO        lisinopriL (PRINIVIL, ZESTRIL) tablet 10 mg  10 mg Oral DAILY Alvarez Siobhan GUTIERREZ MD   10 mg at 12/10/22 2325       Allergies  Allergies   Allergen Reactions    Ancef [Cefazolin] Hives    Contrast Agent [Iodine] Anaphylaxis, Shortness of Breath and Swelling     Needs pre-medication for IV contrast with Benadryl, Solu-Medrol    Fish Containing Products Anaphylaxis     Pt states she had a severe allergic reaction at 9 y/o. Statins-Hmg-Coa Reductase Inhibitors Myalgia    Metformin Other (comments)     Abdominal pain, diarrhea.     Codeine Other (comments)     Altered mental status       OBJECTIVE:    Intake/Output Summary (Last 24 hours) at 12/12/2022 0619  Last data filed at 12/12/2022 0348  Gross per 24 hour   Intake 500 ml   Output 500 ml   Net 0 ml       Visit Vitals  BP (!) 147/74 (BP 1 Location: Right arm, BP Patient Position: At rest)   Pulse 74   Temp 97.3 °F (36.3 °C)   Resp 20   Wt 119.3 kg (263 lb 0.1 oz)   SpO2 94%   BMI 45.15 kg/m²       PHYSICAL EXAM  Gen: NAD, comfortable, obese   HEENT: normocephalic, atraumatic, no scleral icterus  CV: RRR, +2 radial pulses  Pulm: Faint wheezes to right middle lung field and slightly diminished breath sounds at the bases, otherwise CTAB and no WOB  Abd: S/NT/ND, no rebound  MSK: no clubbing, no edema  Skin: warm, dry, intact, no rash  Neuro: CN II-XII grossly intact, no focal deficits appreciated   Psych: appropriate, alert, oriented x3    LABWORK (LAST 24 HOURS)  Recent Results (from the past 24 hour(s))   GLUCOSE, POC    Collection Time: 12/11/22  9:48 AM   Result Value Ref Range    Glucose (POC) 187 (H) 70 - 110 mg/dL   GLUCOSE, POC    Collection Time: 12/11/22  5:56 PM   Result Value Ref Range    Glucose (POC) 241 (H) 70 - 110 mg/dL   GLUCOSE, POC    Collection Time: 12/11/22 10:25 PM   Result Value Ref Range    Glucose (POC) 229 (H) 70 - 110 mg/dL   D DIMER    Collection Time: 12/12/22  3:50 AM   Result Value Ref Range    D DIMER 0.40 <0.46 ug/ml(FEU)   FERRITIN    Collection Time: 12/12/22  3:50 AM   Result Value Ref Range    Ferritin 17 8 - 388 NG/ML       IMAGING AND PROCEDURES (LAST 36 HOURS)  No results found.    ================================================================  Further management for Ms. Vasquez De Anda will be discussed on rounds with my attending.       Jerry Gann MD, PGY-1  [facility]  December 12, 2022 7:55 AM

## 2022-12-12 NOTE — ED NOTES
Pt resting in room, with no complaints at this time. Pt ambulated back and forth to restroom. Will continue to monitor.

## 2022-12-12 NOTE — PROGRESS NOTES
Pharmacist Review and Automatic Dose Adjustment of Prophylactic Enoxaparin    *Review reason for admission/hospital problem list*    The reviewing pharmacist has made an adjustment to the ordered enoxaparin dose or converted to UFH per the approved [facility] protocol and table as identified below. Elizabeth Dean is a 60 y.o. female. No lab exists for component: CREATININE    Estimated Creatinine Clearance: 69.7 mL/min (based on SCr of 1.05 mg/dL).     Height:   Ht Readings from Last 1 Encounters:   09/23/22 162.6 cm (64\")     Weight:  Wt Readings from Last 1 Encounters:   12/12/22 119.3 kg (263 lb 0.1 oz)               Plan: Based upon the patient's weight and renal function, the ordered enoxaparin dose of 40mg daily has been changed/converted to 30 mg every 12 hours      Thank you,  Anselmo Knapp

## 2022-12-12 NOTE — DISCHARGE SUMMARY
Sari Andujar SUMMARY      Name:   Ramya Cast 60 y.o. female  MRN:   [id number]  CSN:   [id number]  Admission Date:  12/10/2022  Discharge Date:  12/13/2022  Attending: Alison Jiang MD  PCP:              Alison Jiang MD   ================================================================  Reason for Admission:  Hypoxia [R09.02]  COVID [U07.1]  Acute respiratory disease due to COVID-19 virus [U07.1, J06.9]  Acute hypoxemic respiratory failure due to COVID-19 ([address] [U07.1, J96.01]    Discharge Diagnosis:    COPD Exacerbation 2/2 Covid-19 Infection  H/o COPD-Asthma Cross-Over Syndrome  SHERRIE  DM, Type 2  HTN  CHF  CAD  GERD    Follow-up studies/evaluations for PCP/Important Notes to PCP:  Virtual hospital follow-up appointment as she is COVID-positive. Consider simplifying inhaler regimen. Patient is on 2 inhaled corticosteroids, both long and short acting antimuscarinics  Medication reconciliation:  Discontinued Medications: none  Prednisone taper: 30 mg for 5 days, then 20 mg for 5 days, then 10 mg for 5 days, then resume chronic prednisone dosing of 5 mg daily  Increased morning insulin glargine to 10 units for 5 days, then return back to 5 units daily like she was on prior to hospitalization. Evening dose of insulin glargine (65 units nightly unchanged). Correctional scale insulin unchanged, but consider reevaluation at hospital follow-up based on the patient's home glucose logs while on increased prednisone dose. Over-the-counter guaifenesin 1200 mg twice daily    YOANA Follow Up Appointment:   Follow-up Information       Follow up With Specialties Details Why Contact Info    [facility]  Follow up in 2 day(s) Please have a virtual follow-up with Dr. Dennise Councilman on 12/16/2022 at 2:40 PM.  Daren Blank will receive a phone call and then a text message with instructions to convert the phone call to a video call.  [address] [place] [address] [facility]  [phone]    Nigel Cash, [address] [facility]  [phone]              Recommended follow-up after RENAY BANDA visit: Patient needs follow-up visit at the discretion of the PCP     Readmission prevention plan:   Ensure patient finishes slow tapering course of prednisone  Ensure follow-up with specialists and consider simplifying inhaler regimen    GOALS OF CARE (including Code Status, Advanced Care Plan):   DNR/DNI    Pending labs/ investigations at discharge to follow up:   None    Operative Procedures:   None    Consultants:    None    Condition at discharge:   Afebrile  Ambulating  Eating, Drinking, Voiding  Stable  No hypoxemia on home oxygen    Disposition at Discharge:  Home    Functional Status at Discharge: Ambulates independently but requires supplemental oxygen, unchanged from prior to admission    Diet: diabetic diet    Discharge Medications:  Discharge Medication List as of 12/13/2022  4:43 PM        START taking these medications    Details   guaiFENesin ER (Mucinex) 600 mg ER tablet Take 1 Tablet by mouth two (2) times a day for 14 days. , No Print, Disp-28 Tablet, R-0           CONTINUE these medications which have CHANGED    Details   lancets (Accu-Chek Softclix Lancets) misc Please use one lancet every time you check your blood sugar, Normal, Disp-100 Each, R-11      !! predniSONE (DELTASONE) 5 mg tablet Take 1 Tablet by mouth daily for 30 days. Do not take the 5mg tablets while taking the prednisone taper, but resume afterwards, No Print, Disp-30 Tablet, R-0      insulin glargine (LANTUS,BASAGLAR) 100 unit/mL (3 mL) inpn 65 Units by SubCUTAneous route nightly for 30 days. Also please take 10 units in the morning for five days, then resume taking 5 units in the morning.   Indications: type 2 diabetes mellitus, No Print, Disp-1 Each, R-0      !! predniSONE (DELTASONE) 10 mg tablet Take 30 mg by mouth daily (with breakfast) for 5 days, THEN 20 mg daily (with breakfast) for 5 days, THEN 10 mg daily (with breakfast) for 5 days. , Normal, Disp-30 Tablet, R-0       !! - Potential duplicate medications found. Please discuss with provider. CONTINUE these medications which have NOT CHANGED    Details   albuterol-ipratropium (DUO-NEB) 2.5 mg-0.5 mg/3 ml nebu 3 mL by Nebulization route every four (4) hours as needed for Wheezing., Historical Med      montelukast (SINGULAIR) 10 mg tablet Take 1 tablet by mouth nightly, Normal, Disp-90 Tablet, R-0      fluticasone propion-salmeteroL (Advair HFA) 230-21 mcg/actuation inhaler Take 2 Puffs by inhalation two (2) times a day. Rinse and gargle after each use. Disp: 3 month, Normal, Disp-3 Each, R-3      budesonide (PULMICORT) 1 mg/2 mL nbsp Take 2 mL by inhalation daily. , Normal, Disp-90 Each, R-3      cholecalciferol (VITAMIN D3) (2,000 UNITS /50 MCG) cap capsule Take 5,000 Units by mouth every seven (7) days. , Historical Med      potassium chloride (K-DUR, KLOR-CON M20) 20 mEq tablet Take 20 mEq by mouth daily. , Historical Med      azelastine (ASTELIN) 137 mcg (0.1 %) nasal spray 2 Sprays by Both Nostrils route as needed., Historical Med      Spiriva Respimat 2.5 mcg/actuation inhaler INHALE 2 SPRAY(S) BY MOUTH ONCE DAILY, Normal, Disp-12 g, R-3      azithromycin (ZITHROMAX) 250 mg tablet TAKE 1 TABLET BY MOUTH ON MONDAY, WEDNESDAY AND FRIDAY, Normal, Disp-30 Tablet, R-0      insulin lispro (HUMALOG) 100 unit/mL kwikpen 6-12 Units. , Historical Med      polyethylene glycol (MIRALAX) 17 gram/dose powder Take 17 g by mouth as needed., Historical Med      furosemide (LASIX) 40 mg tablet Take 1 tablet by mouth twice daily, Normal, Disp-60 Tablet, R-0      fluticasone propionate (FLONASE) 50 mcg/actuation nasal spray 2 Sprays by Both Nostrils route daily. , Normal, Disp-3 Each, R-3      simethicone (GAS-X) 125 mg capsule Take 125 mg by mouth four (4) times daily as needed for Flatulence., Historical Med Blood-Glucose Meter monitoring kit CHECK BLOOD SUGARS B.I.D. E11.9, Historical Med      Insulin Needles, Disposable, 31 gauge x 3/16\" ndle Use with Basaglar once daily. , Historical Med      Insulin Syringe-Needle U-100 0.3 mL 31 gauge x 5/16\" syrg USE AS DIRECTED, Historical Med      acetaminophen (TYLENOL) 500 mg tablet Take 500 mg by mouth every six (6) hours as needed for Fever or Pain., Historical Med      albuterol (PROVENTIL HFA, VENTOLIN HFA, PROAIR HFA) 90 mcg/actuation inhaler Take 2 Puffs by inhalation every four (4) hours as needed for Wheezing., Print, Disp-1 Inhaler, R-0      omeprazole (PRILOSEC) 40 mg capsule Take 40 mg by mouth daily. , Historical Med      lisinopril (PRINIVIL, ZESTRIL) 20 mg tablet Take 10 mg by mouth daily. , Historical Med      OXYGEN-AIR DELIVERY SYSTEMS 2 L by Nasal route continuous. First Choice between 2L and 3L, Historical Med      aspirin delayed-release 81 mg tablet Take 81 mg by mouth daily. , Historical Med      famotidine (PEPCID) 20 mg tablet Take 20 mg by mouth as needed for Heartburn or Gastroesophageal Reflux Disease (GERD). As needed for breakthrough heartburn, Historical Med      EPINEPHrine (EPIPEN) 0.3 mg/0.3 mL injection 0.3 mg by IntraMUSCular route once as needed., Historical Med           STOP taking these medications       glucose blood VI test strips (FreeStyle Lite Strips) strip Comments:   Reason for Stopping:                Hospital Course:     Geronimo Wade is a 60 y.o. female with medical comorbidities including COPD, asthma, T2DM, HLD, CHF, CAD, SHERRIE, GERD, anxiety, arthritis, osteoporosis who presented to [facility] ED on 12/10/2022 with complaint of increased SOB and productive cough, her symptoms started 5 days prior to admission with an increased home oxygen requirement at home from  to [facility], increased use of PRN nebulizers, and intermittent Trilogy machine use in daytime.  Went to [facility] ED on 12/7 and received IV steroids, duonebs, and PO Mg and was sent home with a course of PO prednisone 50mg. She reports only having taken 1 dose of Pred 50mg since then due to poor PO intake. She also reported not taking her PO medications 2 days prior to [facility] admission due to poor PO intake. In the ED, she tested positive for COVID-19 with clinical picture consistent with COPD/asthma exacerbation. She was given IV steroids, given magnesium IV repletion, duonebs, and remdesivir. Upon admission, treatment was continued with 6 mg of dexamethasone daily and IV remdesivir. Treated with PINKY, LABA, LAMA, PAM, and ICS inhalers, converted from nebulizer to avoid aerosolization. She was continued on her home montelukast and thrice weekly azithromycin. By day of discharge, she was able to ambulate on her home oxygen and able to tolerate p.o. medications. Discharged with prednisone taper and hospital follow-up scheduled within 1 week. Patient's problem list was managed in hospital as stated below:     DM, Type 2  -home meds: lantus 5u in AM 65u in PM, lispro SS (if BG <100 no insulin, if 100-200 give 6u, if 201-300 give 8u, if >300 give 12u),   -per patient last HbA1c 7.7% in Nov  Plan:  -On discharge, increased morning insulin glargine from 5 units to 10 units for 5 days while on the 30 mg prednisone dose. Then return to normal insulin dosing  -Can reevaluate insulin regimen at hospital follow-up based on the patient's home glucose log. HTN, CHF, CAD  -home meds: ASA 81, lasix 40mg bid, lisinopril 10mg daily, KCL ER 20mEq  -no signs of fluid overload on exam or CXR  Plan:  -No change in medication regimen     GERD  -home meds: omeprazole 40mg daily, famotidine 20mg PRN for breakthrough heartburn. Was given pantoprazole while inpatient as that is the PPI on hospital formulary  Plan:  -Resume home omeprazole and famotidine      Pertinent Results:      CURRENT ADMISSION IMAGING RESULTS   XR CHEST PORT    Result Date: 12/10/2022  1.   No acute infiltrate or effusion. 2.  Mild chronic interstitial changes. Cardiology Procedures/Testing:  MODALITY RESULTS   EKG Results for orders placed or performed during the hospital encounter of 12/10/22   EKG, 12 LEAD, INITIAL   Result Value Ref Range    Ventricular Rate 81 BPM    Atrial Rate 81 BPM    P-R Interval 140 ms    QRS Duration 130 ms    Q-T Interval 398 ms    QTC Calculation (Bezet) 462 ms    Calculated P Axis 60 degrees    Calculated R Axis 52 degrees    Calculated T Axis 10 degrees    Diagnosis       Normal sinus rhythm  Right bundle branch block  Abnormal ECG  When compared with ECG of 07-FEB-2022 16:11,  No significant change was found  Confirmed by Deng Paz ([id number]) on 12/11/2022 7:01:03 AM         ECHO 06/28/21    ECHO ADULT FOLLOW-UP OR LIMITED 06/29/2021 6/29/2021    Interpretation Summary  · LV: Estimated LVEF is 55 - 60%. Visually measured ejection fraction. Normal cavity size and systolic function (ejection fraction normal). Mild concentric hypertrophy. · PA: Moderate pulmonary hypertension. Pulmonary arterial systolic pressure is 51 mmHg. · Image quality for this study was technically difficult. · Contrast used: DEFINITY. Signed by: eJssica Jurado MD on 6/29/2021  2:58 PM     IR No results found for this or any previous visit. CATH 12/18/20    CARDIAC PROCEDURE 12/22/2020 12/22/2020    Conclusion  Elevated LVEDP. Normal LV function. Moderate 2 vessel coronary artery disease best treated medically  Continue intense risk factor modification.     Signed by: Milly Mckeon MD on 12/22/2020  5:24 PM        Special Testing/Procedures:  MODALITY RESULTS   MICRO All Micro Results       Procedure Component Value Units Date/Time    COVID-19 WITH INFLUENZA A/B [[id number]]  (Abnormal) Collected: 12/10/22 1530    Order Status: Completed Specimen: Nasopharyngeal Updated: 12/10/22 1603     SARS-CoV-2 by PCR Detected        Comment: Positive results are indicative of the presence of SARS-CoV-2. Clinical correlation with patient history and other diagnostic information is necessary to determine patient infection status. Positive results do not rule out bacterial infection or co-infection with other pathogens. Critical value verified. Called to and read back by:  Hawk Lane RN ER ON 12/10/22 AT 1602 TO RPB          Influenza A by PCR Not detected        Influenza B by PCR Not detected        Comment: NOTE: Influenza A and Influenza B negative results should be considered presumptive in samples that have a positive SARS-CoV-2 result. Consider re-testing with an alternate FDA-approved test if clinically indicated. Testing was performed using candace Renita SARS-CoV-2 and Influenza A/B nucleic acid assay. This test is a multiplex Real-Time Reverse Transcriptase Polymerase Chain Reaction (RT-PCR) based in vitro diagnostic test intended for the qualitative detection of nucleic acids from SARS-CoV-2, Influenza A, and Influenza B in nasopharyngeal for use under the FDA's Emergency Use Authorization(EAU) only. Fact sheet for Patients: [employer]ee  Fact sheet for Healthcare Providers: [employer]ee                ABG Lab Results   Component Value Date/Time    pH (POC) 7.43 12/10/2022 03:42 PM    pCO2 (POC) 43.5 12/10/2022 03:42 PM    pO2 (POC) 113 (H) 12/10/2022 03:42 PM    HCO3 (POC) 29.0 (H) 12/10/2022 03:42 PM    FIO2 (POC) 40 12/10/2022 03:42 PM      UA No results found for this or any previous visit.       Laboratory Results:  LABORATORY RESULTS   HEMATOLOGY Lab Results   Component Value Date/Time    WBC 7.8 12/13/2022 01:00 AM    HGB 12.3 12/13/2022 01:00 AM    HCT 35.1 12/13/2022 01:00 AM    PLATELET 510 33/88/4665 01:00 AM    MCV 84.8 12/13/2022 01:00 AM       CHEMISTRIES Lab Results   Component Value Date/Time    Sodium 135 (L) 12/13/2022 01:00 AM    Potassium 3.6 12/13/2022 01:00 AM    Chloride 99 (L) 12/13/2022 01:00 AM    CO2 28 12/13/2022 01:00 AM    Anion gap 8 12/13/2022 01:00 AM    Glucose 290 (H) 12/13/2022 01:00 AM    BUN 27 (H) 12/13/2022 01:00 AM    Creatinine 1.07 12/13/2022 01:00 AM    BUN/Creatinine ratio 25 (H) 12/13/2022 01:00 AM    GFR est AA >60 02/07/2022 03:45 PM    GFR est non-AA >60 02/07/2022 03:45 PM    Calcium 9.4 12/13/2022 01:00 AM      HEPATIC FUNCTION Lab Results   Component Value Date/Time    Albumin 3.4 12/13/2022 01:00 AM    Bilirubin, direct <0.1 02/08/2017 10:00 PM    Bilirubin, total 0.4 12/13/2022 01:00 AM    Protein, total 7.2 12/13/2022 01:00 AM    Globulin 3.8 12/13/2022 01:00 AM    A-G Ratio 0.9 12/13/2022 01:00 AM    ALT (SGPT) 36 12/13/2022 01:00 AM    Alk. phosphatase 89 12/13/2022 01:00 AM       LACTIC ACID Lab Results   Component Value Date/Time    Lactic acid 1.8 08/12/2020 01:57 AM    Lactic acid 1.6 08/11/2020 04:33 AM    Lactic acid 4.8 (HH) 08/10/2020 03:26 AM      CARDIAC PANEL Lab Results   Component Value Date/Time     06/28/2021 10:40 AM    CK - MB <1.0 06/28/2021 10:40 AM    CK-MB Index  06/28/2021 10:40 AM     CALCULATION NOT PERFORMED WHEN RESULT IS BELOW LINEAR LIMIT    Troponin-I, QT <0.02 06/28/2021 10:40 AM      NT-proBNP Lab Results   Component Value Date/Time    NT pro- 12/10/2022 03:20 PM    NT pro- 02/07/2022 03:45 PM    NT pro-BNP 62 06/28/2021 10:40 AM    NT pro-BNP 89 03/30/2021 03:50 PM    NT pro-BNP 55 12/22/2020 03:16 AM      THYROID Lab Results   Component Value Date/Time    TSH 2.29 12/09/2020 03:19 PM            Readmission Risk Score: Low Risk            12 Total Score    3 Has Seen PCP in Last 6 Months (Yes=3, No=0)    9 Pt. Coverage (Medicare=5 , Medicaid, or Self-Pay=4)        Criteria that do not apply:    . Living with Significant Other. Assisted Living. LTAC. SNF.  or   Rehab    Patient Length of Stay (>5 days = 3)    IP Visits Last 12 Months (1-3=4, 4=9, >4=11)    Charlson Comorbidity Score (Age + Comorbid Conditions)        Chris Gibson MD [place], PGY-1  [facility]  12/14/2022 [id number]

## 2022-12-13 VITALS
BODY MASS INDEX: 44.92 KG/M2 | RESPIRATION RATE: 21 BRPM | SYSTOLIC BLOOD PRESSURE: 150 MMHG | WEIGHT: 261.69 LBS | DIASTOLIC BLOOD PRESSURE: 68 MMHG | OXYGEN SATURATION: 96 % | TEMPERATURE: 97 F | HEART RATE: 64 BPM

## 2022-12-13 LAB
ALBUMIN SERPL-MCNC: 3.4 G/DL (ref 3.4–5)
ALBUMIN/GLOB SERPL: 0.9 {RATIO} (ref 0.8–1.7)
ALP SERPL-CCNC: 89 U/L (ref 45–117)
ALT SERPL-CCNC: 36 U/L (ref 13–56)
ANION GAP SERPL CALC-SCNC: 8 MMOL/L (ref 3–18)
AST SERPL-CCNC: 13 U/L (ref 10–38)
BASOPHILS # BLD: 0 K/UL (ref 0–0.1)
BASOPHILS NFR BLD: 0 % (ref 0–2)
BILIRUB SERPL-MCNC: 0.4 MG/DL (ref 0.2–1)
BUN SERPL-MCNC: 27 MG/DL (ref 7–18)
BUN/CREAT SERPL: 25 (ref 12–20)
CALCIUM SERPL-MCNC: 9.4 MG/DL (ref 8.5–10.1)
CHLORIDE SERPL-SCNC: 99 MMOL/L (ref 100–111)
CO2 SERPL-SCNC: 28 MMOL/L (ref 21–32)
CREAT SERPL-MCNC: 1.07 MG/DL (ref 0.6–1.3)
D DIMER PPP FEU-MCNC: 0.48 UG/ML(FEU)
DIFFERENTIAL METHOD BLD: ABNORMAL
EOSINOPHIL # BLD: 0 K/UL (ref 0–0.4)
EOSINOPHIL NFR BLD: 0 % (ref 0–5)
ERYTHROCYTE [DISTWIDTH] IN BLOOD BY AUTOMATED COUNT: 13.6 % (ref 11.6–14.5)
FERRITIN SERPL-MCNC: 16 NG/ML (ref 8–388)
GLOBULIN SER CALC-MCNC: 3.8 G/DL (ref 2–4)
GLUCOSE BLD STRIP.AUTO-MCNC: 161 MG/DL (ref 70–110)
GLUCOSE BLD STRIP.AUTO-MCNC: 176 MG/DL (ref 70–110)
GLUCOSE BLD STRIP.AUTO-MCNC: 247 MG/DL (ref 70–110)
GLUCOSE SERPL-MCNC: 290 MG/DL (ref 74–99)
HCT VFR BLD AUTO: 35.1 % (ref 35–45)
HGB BLD-MCNC: 12.3 G/DL (ref 12–16)
IMM GRANULOCYTES # BLD AUTO: 0.1 K/UL (ref 0–0.04)
IMM GRANULOCYTES NFR BLD AUTO: 2 % (ref 0–0.5)
LYMPHOCYTES # BLD: 1.7 K/UL (ref 0.9–3.6)
LYMPHOCYTES NFR BLD: 21 % (ref 21–52)
MCH RBC QN AUTO: 29.7 PG (ref 24–34)
MCHC RBC AUTO-ENTMCNC: 35 G/DL (ref 31–37)
MCV RBC AUTO: 84.8 FL (ref 78–100)
MONOCYTES # BLD: 0.5 K/UL (ref 0.05–1.2)
MONOCYTES NFR BLD: 6 % (ref 3–10)
NEUTS SEG # BLD: 5.5 K/UL (ref 1.8–8)
NEUTS SEG NFR BLD: 70 % (ref 40–73)
NRBC # BLD: 0 K/UL (ref 0–0.01)
NRBC BLD-RTO: 0 PER 100 WBC
PLATELET # BLD AUTO: 367 K/UL (ref 135–420)
PMV BLD AUTO: 9.1 FL (ref 9.2–11.8)
POTASSIUM SERPL-SCNC: 3.6 MMOL/L (ref 3.5–5.5)
PROT SERPL-MCNC: 7.2 G/DL (ref 6.4–8.2)
RBC # BLD AUTO: 4.14 M/UL (ref 4.2–5.3)
SODIUM SERPL-SCNC: 135 MMOL/L (ref 136–145)
WBC # BLD AUTO: 7.8 K/UL (ref 4.6–13.2)

## 2022-12-13 PROCEDURE — 85025 COMPLETE CBC W/AUTO DIFF WBC: CPT

## 2022-12-13 PROCEDURE — 74011636637 HC RX REV CODE- 636/637

## 2022-12-13 PROCEDURE — 82728 ASSAY OF FERRITIN: CPT

## 2022-12-13 PROCEDURE — 74011250636 HC RX REV CODE- 250/636: Performed by: FAMILY MEDICINE

## 2022-12-13 PROCEDURE — 74011250637 HC RX REV CODE- 250/637: Performed by: STUDENT IN AN ORGANIZED HEALTH CARE EDUCATION/TRAINING PROGRAM

## 2022-12-13 PROCEDURE — 74011250636 HC RX REV CODE- 250/636

## 2022-12-13 PROCEDURE — 74011250637 HC RX REV CODE- 250/637

## 2022-12-13 PROCEDURE — 82962 GLUCOSE BLOOD TEST: CPT

## 2022-12-13 PROCEDURE — 80053 COMPREHEN METABOLIC PANEL: CPT

## 2022-12-13 PROCEDURE — 36415 COLL VENOUS BLD VENIPUNCTURE: CPT

## 2022-12-13 PROCEDURE — 94762 N-INVAS EAR/PLS OXIMTRY CONT: CPT

## 2022-12-13 PROCEDURE — 85379 FIBRIN DEGRADATION QUANT: CPT

## 2022-12-13 PROCEDURE — 94640 AIRWAY INHALATION TREATMENT: CPT

## 2022-12-13 PROCEDURE — 77010033678 HC OXYGEN DAILY

## 2022-12-13 RX ORDER — LANCETS
EACH MISCELLANEOUS
Qty: 100 EACH | Refills: 11 | Status: SHIPPED | OUTPATIENT
Start: 2022-12-13

## 2022-12-13 RX ORDER — GUAIFENESIN 600 MG/1
600 TABLET, EXTENDED RELEASE ORAL 2 TIMES DAILY
Qty: 28 TABLET | Refills: 0 | Status: SHIPPED
Start: 2022-12-13 | End: 2022-12-27

## 2022-12-13 RX ORDER — INSULIN GLARGINE 100 [IU]/ML
65 INJECTION, SOLUTION SUBCUTANEOUS
Qty: 1 EACH | Refills: 0 | Status: SHIPPED
Start: 2022-12-13 | End: 2023-01-12

## 2022-12-13 RX ORDER — POTASSIUM CHLORIDE 20 MEQ/1
20 TABLET, EXTENDED RELEASE ORAL
Status: COMPLETED | OUTPATIENT
Start: 2022-12-13 | End: 2022-12-13

## 2022-12-13 RX ORDER — PREDNISONE 5 MG/1
5 TABLET ORAL DAILY
Qty: 30 TABLET | Refills: 0 | Status: SHIPPED
Start: 2022-12-13 | End: 2023-01-12

## 2022-12-13 RX ORDER — PREDNISONE 10 MG/1
TABLET ORAL
Qty: 30 TABLET | Refills: 0 | Status: SHIPPED | OUTPATIENT
Start: 2022-12-13 | End: 2022-12-28

## 2022-12-13 RX ORDER — INSULIN GLARGINE 100 [IU]/ML
15 INJECTION, SOLUTION SUBCUTANEOUS DAILY
Status: DISCONTINUED | OUTPATIENT
Start: 2022-12-13 | End: 2022-12-13 | Stop reason: HOSPADM

## 2022-12-13 RX ADMIN — ASPIRIN 81 MG: 81 TABLET, COATED ORAL at 09:08

## 2022-12-13 RX ADMIN — DEXAMETHASONE 6 MG: 2 TABLET ORAL at 09:08

## 2022-12-13 RX ADMIN — Medication 15 UNITS: at 09:06

## 2022-12-13 RX ADMIN — FLUTICASONE PROPIONATE 2 SPRAY: 50 SPRAY, METERED NASAL at 09:09

## 2022-12-13 RX ADMIN — Medication 6 UNITS: at 17:00

## 2022-12-13 RX ADMIN — ACETAMINOPHEN 500 MG: 500 TABLET ORAL at 12:21

## 2022-12-13 RX ADMIN — ENOXAPARIN SODIUM 30 MG: 100 INJECTION SUBCUTANEOUS at 09:07

## 2022-12-13 RX ADMIN — POTASSIUM CHLORIDE 20 MEQ: 1500 TABLET, EXTENDED RELEASE ORAL at 09:08

## 2022-12-13 RX ADMIN — LISINOPRIL 10 MG: 10 TABLET ORAL at 09:08

## 2022-12-13 RX ADMIN — BUDESONIDE AND FORMOTEROL FUMARATE DIHYDRATE 2 PUFF: 160; 4.5 AEROSOL RESPIRATORY (INHALATION) at 07:21

## 2022-12-13 RX ADMIN — FUROSEMIDE 40 MG: 40 TABLET ORAL at 09:07

## 2022-12-13 RX ADMIN — PANTOPRAZOLE SODIUM 40 MG: 40 TABLET, DELAYED RELEASE ORAL at 09:08

## 2022-12-13 RX ADMIN — Medication 3 UNITS: at 07:35

## 2022-12-13 RX ADMIN — TIOTROPIUM BROMIDE INHALATION SPRAY 2 PUFF: 3.12 SPRAY, METERED RESPIRATORY (INHALATION) at 07:20

## 2022-12-13 RX ADMIN — Medication 3 UNITS: at 12:20

## 2022-12-13 NOTE — PROGRESS NOTES
[facility].  DAILY PROGRESS NOTE      Patient:    Shala Valdez , 60 y.o. female   MRN:  [id number]  Room/Bed:  412/01  Admission Date:   12/10/2022  Code status:  DNR    Reason for Admission: hypoxia, acute hypoxemic respiratory failure due to COVID-19    ASSESSMENT AND PLAN:   Problem List Items Addressed This Visit    None  Visit Diagnoses       Acute respiratory failure due to COVID-19 [facility])    -  Primary          Ms. Melecio German is a 60 y.o. female with medical co-morbidities including COPD, asthma, DM, HLD, CHF, CAD, SHERRIE, GERD, anxiety, arthritis, osteoporosis who presented to [facility] ED on 12/10/2022 with complaint of increased SOB and productive cough x5 days and positive test for COVID-19 on admission. Now with incremental improvement of her respiratory status on dexamethasone and IV remdesivir.      COPD Exacerbation 2/2 Covid-19 Infection  H/o COPD-Asthma Cross-Over Syndrome  SHERRIE  -follows with pulmonology, Dr. Beka Cortes  -home meds: advair BID, albuterol Q4H PRN, azithromycin 250mg every Mon/Wed/Fri, montelukast 10 QHS, prednisone 5mg daily, spiriva daily, budesonide neb daily, duoneb Q4H PRN (at baseline takes BID), azelastine BID, flonase BID, Fasenra 30mg SubQ every 8 weeks (goes to [facility] for this)  -baseline O2: 2L at rest, 3L with exertion; PM Trilogy  -covid positive, flu negative  -CXR on 12/10: no acute processes, mild chronic interstitial changes  -Physical exam notable for decreased breath sounds bilaterally with trace rhonchi more prominent on expiration  Plan:  - Dexamethasone 6mg PO daily  - Remdesivir as dosed by pharmacy  - Inhalers rather than nebulizer to reduce aerosolization    [] Budesonide-formoterol 160-4.5, two puffs twice daily    [] Tiotropium 2.5, two puffs twice daily    [] Ipratropium-albuterol 20-10 two puffs Q4H as needed  -Montelukast 10 mg p.o. daily  -Continue home azithromycin  -continue supplemental O2 with goal SpO2 >88% (currently on 28), PM Trilogy (pt's daughter brought her noninvasive breathing machine from home)  -incentive spirometry      DM, Type 2  Gastroparesis  -home meds: lantus 5u in AM 65u in PM, lispro SS (if BG <100 no insulin, if 100-200 give 6u, if 201-300 give 8u, if >300 give 12u), miralax PRN, glycerin suppository PRN  -per patient last HbA1c 7.7% in Nov  Plan:  -Increase AM Lantus to 15 units given suboptimal control on steroids  -POC glucose monitoring ACHS  -high dose correctional insulin given steroid administration   -hypoglycemia protocol in place      HTN  CHF  CAD  -home meds: ASA 81, lasix 40mg bid, lisinopril 10mg daily, KCL ER 20mEq  -no signs of fluid overload on exam or CXR  Plan:  -continue ASA, lasix, lisinopril, KCL while admitted  -would caution against IVF given h/o CHF, encourage PO intake     GERD  -home meds: omeprazole 40mg daily, famotidine 20mg PRN for breakthrough heartburn  Plan:  - Pantoprazole 40mg daily while inpatient     Global:    Point of Contact: Sultana Hoffman (daughter) [phone]     Code: DNR/DNI  Diet: regular, advance as tolerated (poor PO intake leading up to admission)  DVT/AC: lovenox  Mobility: per protocol  Disposition: home    SUBJECTIVE:   Events of the last 24 hours:  No acute events overnight. Says she is tolerating OOB much better, breathing more comfortably.       CURRENT INPATIENT MEDICATIONS:  Current Facility-Administered Medications   Medication Dose Route Frequency Provider Last Rate Last Admin    enoxaparin (LOVENOX) injection 30 mg  30 mg SubCUTAneous Q12H Genesis Hernandez MD   30 mg at 12/12/22 2128    sodium chloride (NS) flush 5-40 mL  5-40 mL IntraVENous PRN Lulu Ada, DO   10 mL at 12/12/22 2128    polyethylene glycol (MIRALAX) packet 17 g  17 g Oral DAILY PRN Lulu Ada, DO        dexAMETHasone (DECADRON) tablet 6 mg  6 mg Oral DAILY Lulu Ada, DO   6 mg at 12/12/22 5405    acetaminophen (TYLENOL) tablet 500 mg  500 mg Oral Q6H PRN Vasquez Deacon, DO   500 mg at 12/12/22 1740    aspirin delayed-release tablet 81 mg  81 mg Oral DAILY Vasquez Deacon, DO   81 mg at 12/12/22 7760    azelastine (ASTELIN) 137mcg/spray nasal spray  2 Spray Both Nostrils DAILY PRN Vasquez Deacon, DO        fluticasone propionate (FLONASE) 50 mcg/actuation nasal spray 2 Spray  2 Spray Both Nostrils DAILY Vasquez , DO   2 Spray at 12/12/22 8553    furosemide (LASIX) tablet 40 mg  40 mg Oral BID Vasquez Deacon, DO   40 mg at 12/12/22 1740    insulin glargine (LANTUS) injection 65 Units  65 Units SubCUTAneous QHS Vasquez Deacon, DO   65 Units at 12/12/22 2134    montelukast (SINGULAIR) tablet 10 mg  10 mg Oral QHS Vasquez Deacon, DO   10 mg at 12/12/22 2128    pantoprazole (PROTONIX) tablet 40 mg  40 mg Oral ACB Vasquez Dealucia, DO   40 mg at 12/12/22 0925    potassium chloride (K-DUR, KLOR-CON M20) SR tablet 20 mEq  20 mEq Oral DAILY Vasquez Deacon, DO   20 mEq at 12/12/22 0925    tiotropium bromide (SPIRIVA RESPIMAT) 2.5 mcg /actuation  2 Puff Inhalation QAM RT Vasquez Duenaslucia, DO   2 Puff at 12/12/22 3287    insulin glargine (LANTUS) injection 5 Units  5 Units SubCUTAneous DAILY Vasquez Dealucia, DO   5 Units at 12/12/22 3828    insulin lispro (HUMALOG) injection   SubCUTAneous AC&HS Vasquez , DO   6 Units at 12/12/22 2134    glucose chewable tablet 16 g  16 g Oral PRN Vasquez Deacon, DO        glucagon (GLUCAGEN) injection 1 mg  1 mg IntraMUSCular PRN Vasquez Carvalho, DO        dextrose 10% infusion 0-250 mL  0-250 mL IntraVENous PRN Vasquez Carvalho, DO        remdesivir 100 mg in 0.9% sodium chloride 250 mL IVPB  100 mg IntraVENous Q24H Christine Antoine  mL/hr at 12/12/22 2237 100 mg at 12/12/22 2237    budesonide-formoteroL (SYMBICORT) 160-4.5 mcg/actuation HFA inhaler 2 Puff  2 Puff Inhalation BID RT Vasquez Carvalho, DO   2 Puff at 12/12/22 2130    ipratropium-albuterol (COMBIVENT RESPIMAT) 20 mcg-100 mcg inhalation spray  2 Puff Inhalation Q4H PRN Hardy Oleksandr, DO   2 Puff at 12/12/22 4541    simethicone (MYLICON) tablet 80 mg  80 mg Oral QID PRN Hardy Sang, DO        azithromycin [facility]) tablet 250 mg  250 mg Oral Q MON, WED & Fabby Fraction, DO   250 mg at 12/12/22 2128    famotidine (PEPCID) tablet 20 mg  20 mg Oral DAILY PRN Hardy Sang, DO        lisinopriL (PRINIVIL, ZESTRIL) tablet 10 mg  10 mg Oral DAILY Najma GUTIERREZ MD   10 mg at 12/12/22 2090       Allergies  Allergies   Allergen Reactions    Ancef [Cefazolin] Hives    Contrast Agent [Iodine] Anaphylaxis, Shortness of Breath and Swelling     Needs pre-medication for IV contrast with Benadryl, Solu-Medrol    Fish Containing Products Anaphylaxis     Pt states she had a severe allergic reaction at 9 y/o. Statins-Hmg-Coa Reductase Inhibitors Myalgia    Metformin Other (comments)     Abdominal pain, diarrhea.     Codeine Other (comments)     Altered mental status       OBJECTIVE:    Intake/Output Summary (Last 24 hours) at 12/13/2022 0603  Last data filed at 12/13/2022 0400  Gross per 24 hour   Intake 620 ml   Output 1100 ml   Net -480 ml         Visit Vitals  /70 (BP 1 Location: Right arm, BP Patient Position: At rest)   Pulse 70   Temp 98 °F (36.7 °C)   Resp 20   Wt 118.7 kg (261 lb 11 oz)   SpO2 98%   BMI 44.92 kg/m²       PHYSICAL EXAM  Gen: NAD, comfortable  HEENT: normocephalic, atraumatic, no scleral icterus  CV: RRR, +2 radial pulses  Pulm: Faint wheezes to right middle lung field and slightly diminished breath sounds at the bases, otherwise CTAB and no WOB  Abd: S/NT/ND, no rebound, RLQ hernia  MSK: no clubbing, no edema  Skin: warm, dry, intact, no rash  Neuro: CN II-XII grossly intact, no focal deficits appreciated   Psych: appropriate, alert, oriented x3    LABWORK (LAST 24 HOURS)  Recent Results (from the past 24 hour(s))   GLUCOSE, POC    Collection Time: 12/12/22  6:45 AM   Result Value Ref Range Glucose (POC) 204 (H) 70 - 110 mg/dL   GLUCOSE, POC    Collection Time: 12/12/22 11:32 AM   Result Value Ref Range    Glucose (POC) 185 (H) 70 - 110 mg/dL   GLUCOSE, POC    Collection Time: 12/12/22  4:44 PM   Result Value Ref Range    Glucose (POC) 264 (H) 70 - 110 mg/dL   GLUCOSE, POC    Collection Time: 12/12/22  9:32 PM   Result Value Ref Range    Glucose (POC) 210 (H) 70 - 110 mg/dL   D DIMER    Collection Time: 12/13/22  1:00 AM   Result Value Ref Range    D DIMER 0.48 (H) <0.46 ug/ml(FEU)   FERRITIN    Collection Time: 12/13/22  1:00 AM   Result Value Ref Range    Ferritin 16 8 - 388 NG/ML   CBC WITH AUTOMATED DIFF    Collection Time: 12/13/22  1:00 AM   Result Value Ref Range    WBC 7.8 4.6 - 13.2 K/uL    RBC 4.14 (L) 4.20 - 5.30 M/uL    HGB 12.3 12.0 - 16.0 g/dL    HCT 35.1 35.0 - 45.0 %    MCV 84.8 78.0 - 100.0 FL    MCH 29.7 24.0 - 34.0 PG    MCHC 35.0 31.0 - 37.0 g/dL    RDW 13.6 11.6 - 14.5 %    PLATELET 630 941 - 386 K/uL    MPV 9.1 (L) 9.2 - 11.8 FL    NRBC 0.0 0  WBC    ABSOLUTE NRBC 0.00 0.00 - 0.01 K/uL    NEUTROPHILS 70 40 - 73 %    LYMPHOCYTES 21 21 - 52 %    MONOCYTES 6 3 - 10 %    EOSINOPHILS 0 0 - 5 %    BASOPHILS 0 0 - 2 %    IMMATURE GRANULOCYTES 2 (H) 0.0 - 0.5 %    ABS. NEUTROPHILS 5.5 1.8 - 8.0 K/UL    ABS. LYMPHOCYTES 1.7 0.9 - 3.6 K/UL    ABS. MONOCYTES 0.5 0.05 - 1.2 K/UL    ABS. EOSINOPHILS 0.0 0.0 - 0.4 K/UL    ABS. BASOPHILS 0.0 0.0 - 0.1 K/UL    ABS. IMM.  GRANS. 0.1 (H) 0.00 - 0.04 K/UL    DF AUTOMATED     METABOLIC PANEL, COMPREHENSIVE    Collection Time: 12/13/22  1:00 AM   Result Value Ref Range    Sodium 135 (L) 136 - 145 mmol/L    Potassium 3.6 3.5 - 5.5 mmol/L    Chloride 99 (L) 100 - 111 mmol/L    CO2 28 21 - 32 mmol/L    Anion gap 8 3.0 - 18 mmol/L    Glucose 290 (H) 74 - 99 mg/dL    BUN 27 (H) 7.0 - 18 MG/DL    Creatinine 1.07 0.6 - 1.3 MG/DL    BUN/Creatinine ratio 25 (H) 12 - 20      eGFR 58 (L) >60 ml/min/1.73m2    Calcium 9.4 8.5 - 10.1 MG/DL    Bilirubin, total 0.4 0.2 - 1.0 MG/DL    ALT (SGPT) 36 13 - 56 U/L    AST (SGOT) 13 10 - 38 U/L    Alk. phosphatase 89 45 - 117 U/L    Protein, total 7.2 6.4 - 8.2 g/dL    Albumin 3.4 3.4 - 5.0 g/dL    Globulin 3.8 2.0 - 4.0 g/dL    A-G Ratio 0.9 0.8 - 1.7         IMAGING AND PROCEDURES (LAST 36 HOURS)  No results found.    ================================================================  Further management for Ms. Geronimo Hadley will be discussed on rounds with my attending.       Pily Brooke MD, PGY-1  [facility]  December 13, 2022 7:32 AM

## 2022-12-13 NOTE — PROGRESS NOTES
Problem: Airway Clearance - Ineffective  Goal: Achieve or maintain patent airway  Outcome: Progressing Towards Goal     Problem: Gas Exchange - Impaired  Goal: Absence of hypoxia  Outcome: Progressing Towards Goal  Goal: Promote optimal lung function  Outcome: Progressing Towards Goal     Problem: Breathing Pattern - Ineffective  Goal: Ability to achieve and maintain a regular respiratory rate  Outcome: Progressing Towards Goal     Problem: Body Temperature -  Risk of, Imbalanced  Goal: Ability to maintain a body temperature within defined limits  Outcome: Progressing Towards Goal  Goal: Will regain or maintain usual level of consciousness  Outcome: Progressing Towards Goal  Goal: Complications related to the disease process, condition or treatment will be avoided or minimized  Outcome: Progressing Towards Goal     Problem: Isolation Precautions - Risk of Spread of Infection  Goal: Prevent transmission of infectious organism to others  Outcome: Progressing Towards Goal     Problem: Nutrition Deficits  Goal: Optimize nutrtional status  Outcome: Progressing Towards Goal     Problem: Risk for Fluid Volume Deficit  Goal: Maintain normal heart rhythm  Outcome: Progressing Towards Goal  Goal: Maintain absence of muscle cramping  Outcome: Progressing Towards Goal  Goal: Maintain normal serum potassium, sodium, calcium, phosphorus, and pH  Outcome: Progressing Towards Goal     Problem: Loneliness or Risk for Loneliness  Goal: Demonstrate positive use of time alone when socialization is not possible  Outcome: Progressing Towards Goal     Problem: Falls - Risk of  Goal: *Absence of Falls  Description: Document Ricarda Fall Risk and appropriate interventions in the flowsheet.   Outcome: Progressing Towards Goal  Note: Fall Risk Interventions:            Medication Interventions: Assess postural VS orthostatic hypotension, Patient to call before getting OOB, Teach patient to arise slowly                   Problem: Pain  Goal: *Control of Pain  Outcome: Progressing Towards Goal  Goal: *PALLIATIVE CARE:  Alleviation of Pain  Outcome: Progressing Towards Goal     Problem: Patient Education: Go to Patient Education Activity  Goal: Patient/Family Education  Outcome: Progressing Towards Goal     Problem: Chronic Obstructive Pulmonary Disease (COPD)  Goal: *Oxygen saturation during activity within specified parameters  Outcome: Progressing Towards Goal  Goal: *Able to remain out of bed as prescribed  Outcome: Progressing Towards Goal  Goal: *Absence of hypoxia  Outcome: Progressing Towards Goal  Goal: *Optimize nutritional status  Outcome: Progressing Towards Goal     Problem: Patient Education: Go to Patient Education Activity  Goal: Patient/Family Education  Outcome: Progressing Towards Goal

## 2022-12-13 NOTE — DISCHARGE INSTRUCTIONS
You were admitted for an exacerbation of your COPD and asthma due to COVID-19 infection. We treated you with dexamethasone and remdesivir. We also continued you on your home inhaled medications, however we administered them via inhaler rather than nebulizer in order to prevent COVID from being spread through the air. On discharge, you do not need to continue taking remdesivir. While in the hospital, you were taking 6 mg of dexamethasone, which is roughly equivalent to 40 mg of prednisone. We want to taper you down to your home dose of prednisone. Starting tomorrow (12/14/22) please take 30 mg of prednisone for 5 days, then take 20 mg of prednisone for 5 days, then take 10 mg of prednisone for 5 days, then resume your regular dose of 5 mg of prednisone daily. You will be given 10 mg tablets, so it will be 3 pills for 5 days, 2 pills for 5 days, 1 pill for 5 days, and then the 10 mg tablets should be out and you should go back to the 5 mg pills. Because of the additional steroids, we want you to increase your morning insulin glargine from 5 units to 10 units. Do this for 5 days while you are taking the 30 mg dose of prednisone, and then reduce back to your usual 5 units when you transition to the 20 mg dose of prednisone. Please monitor your sugar and write down your numbers to bring to your virtual follow-up appointment on 12/16/2022 at 2:40 PM to see if your insulin dose needs to be further adjusted. Please call [address] if your blood sugar goes above 400. Otherwise, please continue to take your medications as you took them prior to this hospitalization.

## 2022-12-13 NOTE — PROGRESS NOTES
Walk test completed on 2L oxygen via NC. Oxygen on 2L sitting  - 96% and HR 64  Oxygen on 2 L walking in the room and to the  bathroom - 95%, and HR 72.

## 2022-12-14 NOTE — PROGRESS NOTES
Discharge instruction and education completed with patient. Opportunities for questions given, and answers provided. The PIV line was pulled out, armband cut off, and patient was discharged home in stable condition.

## 2022-12-16 ENCOUNTER — APPOINTMENT (OUTPATIENT)
Dept: PULMONOLOGY | Age: 62
End: 2022-12-16

## 2022-12-16 NOTE — PROGRESS NOTES
Physician Progress Note      PATIENT:               Luly Madison  CSN #:                  [id number]  :                       1960  ADMIT DATE:       12/10/2022 3:05 PM  [address] DATE:        2022 6:33 PM  RESPONDING  PROVIDER #:        Mariangel Valderrama MD          QUERY TEXT:    Pt admitted with Covid-19, COPD/asthma exacerbation and has respiratory failure documented. If possible, please document in progress notes and discharge summary further specificity regarding the type and acuity of respiratory failure: The medical record reflects the following:  Risk Factors: COPD, asthma, SHERRIE, anxiety, uses home O2 and trelogy and night  Clinical Indicators: Patient presented with worsening SOB. VS  T 97.9 P 85 RR 30 O2 sat 97% /95  Per the ER provider:  \"She is normally on 2 L of oxygen at home but needs to increase it to 3 L to walk. Carlos Hernandez She presented with conversational dyspnea and hypoxia. Initially we had planned to put her on BiPAP. She was unable to tolerate it. At that point we put her on 2 L nasal cannula. \"  ABGS: ph 7.43, pCO2 43.5, pO2 113, HCO3 29.0, sO2 98.6% on 40% Fio2  H&P: Acute on chronic resp failure with increase 02 requirements, on home Trilogy  DC summary: Reason for Admission:  Hypoxia, COVID Acute respiratory disease due to COVID-19 virus, Acute hypoxemic respiratory failure due to COVID-19 . Discharge diagnosis: COPD Exacerbation 2/2 Covid-19 Infection, H/o COPD-Asthma Cross-Over Syndrome, SHERRIE. Carlos Hernandez In the ED, she tested positive for COVID-19 with clinical picture consistent with COPD/asthma exacerbation  Treatment: O2 via NC, home Trilogy use at night, ABG's,  IV solumedrol, duonebs, remdesivir    Ananth Queen, RN, BSN, CCDS  CDI  [email or web address]  Options provided:  -- Acute on chronic respiratory failure with hypoxia confirmed  -- Chronic respiratory failure with hypoxia only, acute respiratory failure ruled out  -- Other - I will add my own diagnosis  -- Disagree - Not applicable / Not valid  -- Disagree - Clinically unable to determine / Unknown  -- Refer to Clinical Documentation Reviewer    PROVIDER RESPONSE TEXT:    This patient had acute on chronic respiratory failure with hypoxia confirmed.     Query created by: Lanette Milton on 12/16/2022 11:55 AM      Electronically signed by:  Luca Murphy MD 12/16/2022 11:58 AM

## 2022-12-19 ENCOUNTER — APPOINTMENT (OUTPATIENT)
Dept: PULMONOLOGY | Age: 62
End: 2022-12-19

## 2022-12-20 ENCOUNTER — TRANSCRIBE ORDER (OUTPATIENT)
Dept: SCHEDULING | Age: 62
End: 2022-12-20

## 2022-12-20 DIAGNOSIS — F17.210 NICOTINE DEPENDENCE, CIGARETTES, UNCOMPLICATED: Primary | ICD-10-CM

## 2022-12-23 ENCOUNTER — APPOINTMENT (OUTPATIENT)
Dept: PULMONOLOGY | Age: 62
End: 2022-12-23

## 2022-12-26 ENCOUNTER — APPOINTMENT (OUTPATIENT)
Dept: PULMONOLOGY | Age: 62
End: 2022-12-26

## 2022-12-30 ENCOUNTER — APPOINTMENT (OUTPATIENT)
Dept: PULMONOLOGY | Age: 62
End: 2022-12-30

## 2023-01-02 ENCOUNTER — APPOINTMENT (OUTPATIENT)
Dept: PULMONOLOGY | Age: 63
End: 2023-01-02

## 2023-01-06 ENCOUNTER — APPOINTMENT (OUTPATIENT)
Dept: PULMONOLOGY | Age: 63
End: 2023-01-06

## 2023-01-06 ENCOUNTER — APPOINTMENT (OUTPATIENT)
Dept: INFUSION THERAPY | Age: 63
End: 2023-01-06
Payer: MEDICARE

## 2023-01-13 ENCOUNTER — HOSPITAL ENCOUNTER (OUTPATIENT)
Dept: INFUSION THERAPY | Age: 63
End: 2023-01-13
Payer: MEDICARE

## 2023-01-13 VITALS
HEART RATE: 83 BPM | SYSTOLIC BLOOD PRESSURE: 148 MMHG | OXYGEN SATURATION: 94 % | RESPIRATION RATE: 22 BRPM | DIASTOLIC BLOOD PRESSURE: 82 MMHG | TEMPERATURE: 98.1 F

## 2023-01-13 DIAGNOSIS — J45.51 SEVERE PERSISTENT ASTHMA WITH EXACERBATION: Primary | ICD-10-CM

## 2023-01-13 PROCEDURE — 74011250636 HC RX REV CODE- 250/636: Performed by: INTERNAL MEDICINE

## 2023-01-13 PROCEDURE — 96372 THER/PROPH/DIAG INJ SC/IM: CPT

## 2023-01-13 RX ORDER — ALBUTEROL SULFATE 90 UG/1
4 AEROSOL, METERED RESPIRATORY (INHALATION) PRN
OUTPATIENT
Start: 2023-03-10

## 2023-01-13 RX ORDER — EPINEPHRINE 1 MG/ML
0.3 INJECTION, SOLUTION, CONCENTRATE INTRAVENOUS PRN
OUTPATIENT
Start: 2023-03-10

## 2023-01-13 RX ORDER — ACETAMINOPHEN 325 MG/1
650 TABLET ORAL
OUTPATIENT
Start: 2023-03-10

## 2023-01-13 RX ORDER — DIPHENHYDRAMINE HYDROCHLORIDE 50 MG/ML
50 INJECTION INTRAMUSCULAR; INTRAVENOUS
OUTPATIENT
Start: 2023-03-10

## 2023-01-13 RX ORDER — SODIUM CHLORIDE 9 MG/ML
INJECTION, SOLUTION INTRAVENOUS CONTINUOUS
OUTPATIENT
Start: 2023-03-10

## 2023-01-13 RX ORDER — ONDANSETRON 2 MG/ML
8 INJECTION INTRAMUSCULAR; INTRAVENOUS
OUTPATIENT
Start: 2023-03-10

## 2023-01-13 RX ADMIN — BENRALIZUMAB 30 MG: 30 INJECTION, SOLUTION SUBCUTANEOUS at 13:49

## 2023-01-13 NOTE — PROGRESS NOTES
[facility] Progress Note    Date: 2023    Name: Ramya Cast    MRN: [id number]         : 1960    Ms. Kina Umanzor arrived in the [facility] today at [phone], in stable condition, here for her FASENRA Injection (Every 8 Weeks). She was assessed and education was provided. Ms. Heladio Powell vitals were reviewed. Visit Vitals  BP (!) 148/82 (BP 1 Location: Left upper arm, BP Patient Position: Sitting)   Pulse 83   Temp 98.1 °F (36.7 °C)   Resp 22   SpO2 94%   Breastfeeding No     Benralizumab (FASENRA) 30 mg was administered SQ in the back of her LEFT arm per order. Bandaid applied. Ms. Kina Umanzor tolerated well and voiced no complaints. Patient armband removed and shredded. Ms. Kina Umanzor was discharged from [place] [address] in stable condition at 1350. She is to return on 3/10/23 at 1330 for her next [facility] Injection.      Sho Edouard RN  2023

## 2023-01-18 ENCOUNTER — HOSPITAL ENCOUNTER (INPATIENT)
Age: 63
LOS: 4 days | Discharge: HOME | DRG: 190 | End: 2023-01-22
Attending: EMERGENCY MEDICINE | Admitting: EMERGENCY MEDICINE
Payer: MEDICARE

## 2023-01-18 ENCOUNTER — APPOINTMENT (OUTPATIENT)
Dept: GENERAL RADIOLOGY | Age: 63
DRG: 190 | End: 2023-01-18
Attending: EMERGENCY MEDICINE
Payer: MEDICARE

## 2023-01-18 DIAGNOSIS — J44.1 ACUTE EXACERBATION OF CHRONIC OBSTRUCTIVE PULMONARY DISEASE (COPD) (HCC): ICD-10-CM

## 2023-01-18 DIAGNOSIS — J44.1 COPD EXACERBATION (HCC): Primary | ICD-10-CM

## 2023-01-18 DIAGNOSIS — B34.8 PARAINFLUENZA TYPE 1 INFECTION: ICD-10-CM

## 2023-01-18 PROBLEM — R81 GLUCOSURIA: Status: ACTIVE | Noted: 2023-01-18

## 2023-01-18 LAB
ALBUMIN SERPL-MCNC: 3.9 G/DL (ref 3.4–5)
ALBUMIN/GLOB SERPL: 1 (ref 0.8–1.7)
ALP SERPL-CCNC: 88 U/L (ref 45–117)
ALT SERPL-CCNC: 45 U/L (ref 13–56)
ANION GAP SERPL CALC-SCNC: 5 MMOL/L (ref 3–18)
ARTERIAL PATENCY WRIST A: POSITIVE
AST SERPL-CCNC: 21 U/L (ref 10–38)
ATRIAL RATE: 80 BPM
B PERT DNA SPEC QL NAA+PROBE: NOT DETECTED
BASE DEFICIT BLD-SCNC: 1.6 MMOL/L
BASOPHILS # BLD: 0 K/UL (ref 0–0.1)
BASOPHILS NFR BLD: 0 % (ref 0–2)
BDY SITE: ABNORMAL
BILIRUB SERPL-MCNC: 0.7 MG/DL (ref 0.2–1)
BNP SERPL-MCNC: 27 PG/ML (ref 0–900)
BORDETELLA PARAPERTUSSIS PCR, BORPAR: NOT DETECTED
BUN SERPL-MCNC: 12 MG/DL (ref 7–18)
BUN/CREAT SERPL: 12 (ref 12–20)
C PNEUM DNA SPEC QL NAA+PROBE: NOT DETECTED
CALCIUM SERPL-MCNC: 9.9 MG/DL (ref 8.5–10.1)
CALCULATED P AXIS, ECG09: 50 DEGREES
CALCULATED R AXIS, ECG10: 79 DEGREES
CALCULATED T AXIS, ECG11: 6 DEGREES
CHLORIDE SERPL-SCNC: 101 MMOL/L (ref 100–111)
CO2 SERPL-SCNC: 31 MMOL/L (ref 21–32)
CREAT SERPL-MCNC: 0.98 MG/DL (ref 0.6–1.3)
DIAGNOSIS, 93000: NORMAL
DIFFERENTIAL METHOD BLD: ABNORMAL
EOSINOPHIL # BLD: 0.1 K/UL (ref 0–0.4)
EOSINOPHIL NFR BLD: 1 % (ref 0–5)
ERYTHROCYTE [DISTWIDTH] IN BLOOD BY AUTOMATED COUNT: 14.2 % (ref 11.6–14.5)
FLUAV H1 2009 PAND RNA SPEC QL NAA+PROBE: NOT DETECTED
FLUAV H1 RNA SPEC QL NAA+PROBE: NOT DETECTED
FLUAV H3 RNA SPEC QL NAA+PROBE: NOT DETECTED
FLUAV SUBTYP SPEC NAA+PROBE: NOT DETECTED
FLUBV RNA SPEC QL NAA+PROBE: NOT DETECTED
GAS FLOW.O2 O2 DELIVERY SYS: ABNORMAL
GLOBULIN SER CALC-MCNC: 4 G/DL (ref 2–4)
GLUCOSE SERPL-MCNC: 154 MG/DL (ref 74–99)
HADV DNA SPEC QL NAA+PROBE: NOT DETECTED
HCO3 BLD-SCNC: 23.3 MMOL/L (ref 22–26)
HCOV 229E RNA SPEC QL NAA+PROBE: NOT DETECTED
HCOV HKU1 RNA SPEC QL NAA+PROBE: NOT DETECTED
HCOV NL63 RNA SPEC QL NAA+PROBE: NOT DETECTED
HCOV OC43 RNA SPEC QL NAA+PROBE: NOT DETECTED
HCT VFR BLD AUTO: 42 % (ref 35–45)
HGB BLD-MCNC: 14.3 G/DL (ref 12–16)
HMPV RNA SPEC QL NAA+PROBE: NOT DETECTED
HPIV1 RNA SPEC QL NAA+PROBE: DETECTED
HPIV2 RNA SPEC QL NAA+PROBE: NOT DETECTED
HPIV3 RNA SPEC QL NAA+PROBE: NOT DETECTED
HPIV4 RNA SPEC QL NAA+PROBE: NOT DETECTED
IMM GRANULOCYTES # BLD AUTO: 0 K/UL (ref 0–0.04)
IMM GRANULOCYTES NFR BLD AUTO: 1 % (ref 0–0.5)
LYMPHOCYTES # BLD: 2 K/UL (ref 0.9–3.6)
LYMPHOCYTES NFR BLD: 45 % (ref 21–52)
M PNEUMO DNA SPEC QL NAA+PROBE: NOT DETECTED
MCH RBC QN AUTO: 28.6 PG (ref 24–34)
MCHC RBC AUTO-ENTMCNC: 34 G/DL (ref 31–37)
MCV RBC AUTO: 84 FL (ref 78–100)
MONOCYTES # BLD: 0.4 K/UL (ref 0.05–1.2)
MONOCYTES NFR BLD: 8 % (ref 3–10)
NEUTS SEG # BLD: 2 K/UL (ref 1.8–8)
NEUTS SEG NFR BLD: 44 % (ref 40–73)
NRBC # BLD: 0 K/UL (ref 0–0.01)
NRBC BLD-RTO: 0 PER 100 WBC
P-R INTERVAL, ECG05: 138 MS
PCO2 BLD: 39.2 MMHG (ref 35–45)
PH BLD: 7.38 (ref 7.35–7.45)
PLATELET # BLD AUTO: 318 K/UL (ref 135–420)
PMV BLD AUTO: 8.6 FL (ref 9.2–11.8)
PO2 BLD: 105 MMHG (ref 80–100)
POTASSIUM SERPL-SCNC: 4.1 MMOL/L (ref 3.5–5.5)
PROT SERPL-MCNC: 7.9 G/DL (ref 6.4–8.2)
Q-T INTERVAL, ECG07: 408 MS
QRS DURATION, ECG06: 132 MS
QTC CALCULATION (BEZET), ECG08: 470 MS
RBC # BLD AUTO: 5 M/UL (ref 4.2–5.3)
RSV RNA SPEC QL NAA+PROBE: NOT DETECTED
RV+EV RNA SPEC QL NAA+PROBE: NOT DETECTED
SAO2 % BLD: 98 % (ref 92–97)
SARS-COV-2 PCR, COVPCR: NOT DETECTED
SERVICE CMNT-IMP: ABNORMAL
SODIUM SERPL-SCNC: 137 MMOL/L (ref 136–145)
SPECIMEN TYPE: ABNORMAL
TOTAL RESP. RATE, ITRR: 24
TROPONIN-HIGH SENSITIVITY: 9 NG/L (ref 0–54)
VENTRICULAR RATE, ECG03: 80 BPM
WBC # BLD AUTO: 4.5 K/UL (ref 4.6–13.2)

## 2023-01-18 PROCEDURE — 83880 ASSAY OF NATRIURETIC PEPTIDE: CPT

## 2023-01-18 PROCEDURE — 82803 BLOOD GASES ANY COMBINATION: CPT

## 2023-01-18 PROCEDURE — 74011000250 HC RX REV CODE- 250: Performed by: EMERGENCY MEDICINE

## 2023-01-18 PROCEDURE — 74011636637 HC RX REV CODE- 636/637: Performed by: EMERGENCY MEDICINE

## 2023-01-18 PROCEDURE — 65270000046 HC RM TELEMETRY

## 2023-01-18 PROCEDURE — 74011250636 HC RX REV CODE- 250/636: Performed by: EMERGENCY MEDICINE

## 2023-01-18 PROCEDURE — 84484 ASSAY OF TROPONIN QUANT: CPT

## 2023-01-18 PROCEDURE — 85025 COMPLETE CBC W/AUTO DIFF WBC: CPT

## 2023-01-18 PROCEDURE — 99285 EMERGENCY DEPT VISIT HI MDM: CPT

## 2023-01-18 PROCEDURE — 77010033678 HC OXYGEN DAILY

## 2023-01-18 PROCEDURE — 93005 ELECTROCARDIOGRAM TRACING: CPT

## 2023-01-18 PROCEDURE — 36600 WITHDRAWAL OF ARTERIAL BLOOD: CPT

## 2023-01-18 PROCEDURE — 71045 X-RAY EXAM CHEST 1 VIEW: CPT

## 2023-01-18 PROCEDURE — 0202U NFCT DS 22 TRGT SARS-COV-2: CPT

## 2023-01-18 PROCEDURE — 94761 N-INVAS EAR/PLS OXIMETRY MLT: CPT

## 2023-01-18 PROCEDURE — 80053 COMPREHEN METABOLIC PANEL: CPT

## 2023-01-18 PROCEDURE — 99223 1ST HOSP IP/OBS HIGH 75: CPT | Performed by: INTERNAL MEDICINE

## 2023-01-18 PROCEDURE — 96365 THER/PROPH/DIAG IV INF INIT: CPT

## 2023-01-18 PROCEDURE — 96375 TX/PRO/DX INJ NEW DRUG ADDON: CPT

## 2023-01-18 PROCEDURE — 94762 N-INVAS EAR/PLS OXIMTRY CONT: CPT

## 2023-01-18 RX ORDER — ALBUTEROL SULFATE 2.5 MG/.5ML
SOLUTION RESPIRATORY (INHALATION)
Status: DISCONTINUED
Start: 2023-01-18 | End: 2023-01-19

## 2023-01-18 RX ORDER — ALBUTEROL SULFATE 2.5 MG/.5ML
5 SOLUTION RESPIRATORY (INHALATION)
Status: COMPLETED | OUTPATIENT
Start: 2023-01-18 | End: 2023-01-18

## 2023-01-18 RX ORDER — PREDNISONE 50 MG/1
50 TABLET ORAL DAILY
Qty: 5 TABLET | Refills: 0 | Status: SHIPPED | OUTPATIENT
Start: 2023-01-18 | End: 2023-01-23

## 2023-01-18 RX ORDER — PREDNISONE 20 MG/1
60 TABLET ORAL
Status: COMPLETED | OUTPATIENT
Start: 2023-01-18 | End: 2023-01-18

## 2023-01-18 RX ORDER — ALBUTEROL SULFATE 2.5 MG/.5ML
5 SOLUTION RESPIRATORY (INHALATION)
Status: DISCONTINUED | OUTPATIENT
Start: 2023-01-18 | End: 2023-01-18

## 2023-01-18 RX ADMIN — METHYLPREDNISOLONE SODIUM SUCCINATE 125 MG: 125 INJECTION, POWDER, FOR SOLUTION INTRAMUSCULAR; INTRAVENOUS at 15:13

## 2023-01-18 RX ADMIN — CEFTRIAXONE 1 G: 1 INJECTION, POWDER, FOR SOLUTION INTRAMUSCULAR; INTRAVENOUS at 15:24

## 2023-01-18 RX ADMIN — ALBUTEROL SULFATE 5 MG: 2.5 SOLUTION RESPIRATORY (INHALATION) at 19:26

## 2023-01-18 RX ADMIN — ALBUTEROL SULFATE 5 MG: 2.5 SOLUTION RESPIRATORY (INHALATION) at 15:13

## 2023-01-18 RX ADMIN — PREDNISONE 60 MG: 20 TABLET ORAL at 18:32

## 2023-01-18 RX ADMIN — AZITHROMYCIN MONOHYDRATE 500 MG: 500 INJECTION, POWDER, LYOPHILIZED, FOR SOLUTION INTRAVENOUS at 16:26

## 2023-01-18 NOTE — ED NOTES
Assumed care of pt in rm 5. Pt here for SOB, currently sitting on side of bed, NC in place on full cardiac monitor. 20 ga PIV noted to L AC, pt medicated per MAR.

## 2023-01-18 NOTE — ED TRIAGE NOTES
Pt arrived with c/o of increasing SOB that started appx three days ago. Pt is O2 saturations are is 97% on 2L/NC (which pt is normally on) however, pt is unable to complete full sentences. Pt is also c/o of chest pain and right side back pain that started last night.

## 2023-01-18 NOTE — ED NOTES
PT was walking with SPO2 and was noted to have a consistent O2 sat of 96-97% on 3 lpm O2 Via NC. PT did complain of increasing shortness of breath and did have to stop [facility] during trip.

## 2023-01-18 NOTE — ED PROVIDER NOTES
EMERGENCY DEPARTMENT HISTORY AND PHYSICAL EXAM    3:07 PM patient seen at this time in room 5      Date: 1/18/2023  Patient Name: Trixie Corbin    History of Presenting Illness     Chief Complaint   Patient presents with    Shortness of Breath         History Provided By: patient    Additional History (Context): Trixie Corbin is a 60 y.o. female presents with  History significant for COPD asthma and CHF, chronically on 2 L a minute nasal cannula, 3 L a minute when exerting herself, has 3 days of illness, started with a sore throat then on the second day difficulty breathing now coughing up green phlegm and very short of breath. Some help with albuterol. Some pleuritic chest pain. Omayra Medina PCP: Sherman Haney MD    Chief Complaint:   Duration:    Timing:    Location:   Quality:   Severity:   Modifying Factors:   Associated Symptoms:       Current Facility-Administered Medications   Medication Dose Route Frequency Provider Last Rate Last Admin    azithromycin (ZITHROMAX) 500 mg in 0.9% sodium chloride 250 mL (VIAL-MATE)  500 mg IntraVENous Q24H Vitaliy DIXON  mL/hr at 01/18/23 1626 500 mg at 01/18/23 1626    predniSONE (DELTASONE) tablet 60 mg  60 mg Oral NOW Abdelrahman Felton MD         Current Outpatient Medications   Medication Sig Dispense Refill    predniSONE (DELTASONE) 50 mg tablet Take 1 Tablet by mouth daily for 5 days. 5 Tablet 0    lancets (Accu-Chek Softclix Lancets) misc Please use one lancet every time you check your blood sugar 100 Each 11    famotidine (PEPCID) 20 mg tablet Take 20 mg by mouth as needed for Heartburn or Gastroesophageal Reflux Disease (GERD).  As needed for breakthrough heartburn      albuterol-ipratropium (DUO-NEB) 2.5 mg-0.5 mg/3 ml nebu 3 mL by Nebulization route every four (4) hours as needed for Wheezing.      montelukast (SINGULAIR) 10 mg tablet Take 1 tablet by mouth nightly 90 Tablet 0    fluticasone propion-salmeteroL (Advair HFA) 230-21 mcg/actuation inhaler Take 2 Puffs by inhalation two (2) times a day. Rinse and gargle after each use. Disp: 3 month 3 Each 3    budesonide (PULMICORT) 1 mg/2 mL nbsp Take 2 mL by inhalation daily. 90 Each 3    cholecalciferol (VITAMIN D3) (2,000 UNITS /50 MCG) cap capsule Take 5,000 Units by mouth every seven (7) days. potassium chloride (K-DUR, KLOR-CON M20) 20 mEq tablet Take 20 mEq by mouth daily. azelastine (ASTELIN) 137 mcg (0.1 %) nasal spray 2 Sprays by Both Nostrils route as needed. EPINEPHrine (EPIPEN) 0.3 mg/0.3 mL injection 0.3 mg by IntraMUSCular route once as needed. Spiriva Respimat 2.5 mcg/actuation inhaler INHALE 2 SPRAY(S) BY MOUTH ONCE DAILY (Patient taking differently: Take 2 Puffs by inhalation daily.) 12 g 3    azithromycin (ZITHROMAX) 250 mg tablet TAKE 1 TABLET BY MOUTH ON MONDAY, WEDNESDAY AND FRIDAY (Patient taking differently: Take 250 mg by mouth DIALYSIS MON, WED & FRI. ) 30 Tablet 0    insulin lispro (HUMALOG) 100 unit/mL kwikpen 6-12 Units. polyethylene glycol (MIRALAX) 17 gram/dose powder Take 17 g by mouth as needed. furosemide (LASIX) 40 mg tablet Take 1 tablet by mouth twice daily (Patient taking differently: Take 40 mg by mouth two (2) times a day.) 60 Tablet 0    fluticasone propionate (FLONASE) 50 mcg/actuation nasal spray 2 Sprays by Both Nostrils route daily. 3 Each 3    simethicone (GAS-X) 125 mg capsule Take 125 mg by mouth four (4) times daily as needed for Flatulence. Blood-Glucose Meter monitoring kit CHECK BLOOD SUGARS B.I.D. E11.9      Insulin Needles, Disposable, 31 gauge x 3/16\" ndle Use with Basaglar once daily. Insulin Syringe-Needle U-100 0.3 mL 31 gauge x 5/16\" syrg USE AS DIRECTED      acetaminophen (TYLENOL) 500 mg tablet Take 500 mg by mouth every six (6) hours as needed for Fever or Pain.       albuterol (PROVENTIL HFA, VENTOLIN HFA, PROAIR HFA) 90 mcg/actuation inhaler Take 2 Puffs by inhalation every four (4) hours as needed for Wheezing. 1 Inhaler 0    omeprazole (PRILOSEC) 40 mg capsule Take 40 mg by mouth daily. lisinopril (PRINIVIL, ZESTRIL) 20 mg tablet Take 10 mg by mouth daily. OXYGEN-AIR DELIVERY SYSTEMS 2 L by Nasal route continuous. First Choice between 2L and 3L      aspirin delayed-release 81 mg tablet Take 81 mg by mouth daily.          Past History     Past Medical History:  Past Medical History:   Diagnosis Date    Asthma     Chronic lung disease     COPD     Cystocele, midline     Diabetes mellitus (HCC)     GERD (gastroesophageal reflux disease)     Hidradenitis suppurativa     Hyperlipidemia     Hypertension     SHERRIE on CPAP     CPAP    Stress incontinence        Past Surgical History:  Past Surgical History:   Procedure Laterality Date    HX APPENDECTOMY      HX CATARACT REMOVAL Right     HX CHOLECYSTECTOMY      HX DILATION AND CURETTAGE      Dysfunctional uterine bleeding, thought 2/2 fibroids    HX HEART CATHETERIZATION  2020    HX TUBAL LIGATION      SD UNLISTED PROCEDURE BREAST      Right breast benign tumor removal       Family History:  Family History   Problem Relation Age of Onset    Hypertension Mother     Stroke Mother     Breast Cancer Mother         Bilateral mastectomies    Cancer Mother         ovarian and breast    Heart Failure Mother     Ovarian Cancer Mother     Heart Attack Father         2011    Heart Surgery Father         CABG    Heart Failure Father     COPD Sister         Heavy smoker    Cancer Sister         ovarian    Heart Failure Sister     Lung Disease Sister     Asthma Child     Cancer Maternal Aunt         pancreatic    Cancer Maternal Grandfather         stomach       Social History:  Social History     Tobacco Use    Smoking status: Former     Packs/day: 1.00     Years: 30.00     Pack years: 30.00     Types: Cigarettes     Start date: 1966     Quit date: 2006     Years since quittin.3    Smokeless tobacco: Former    Tobacco comments:     1-1.5 packs per day   Vaping Use    Vaping Use: Never used   Substance Use Topics    Alcohol use: No    Drug use: No       Allergies: Allergies   Allergen Reactions    Ancef [Cefazolin] Hives     Has prev tolerated ceftriaxone as well as pip/tazo and amox    Contrast Agent [Iodine] Anaphylaxis, Shortness of Breath and Swelling     Needs pre-medication for IV contrast with Benadryl, Solu-Medrol    Fish Containing Products Anaphylaxis     Pt states she had a severe allergic reaction at 7 y/o. Statins-Hmg-Coa Reductase Inhibitors Myalgia    Metformin Other (comments)     Abdominal pain, diarrhea. Codeine Other (comments)     Altered mental status         Review of Systems     Review of Systems   Constitutional:  Negative for diaphoresis and fever. HENT:  Positive for sore throat. Negative for congestion. Eyes:  Negative for pain and itching. Respiratory:  Positive for cough and shortness of breath. Cardiovascular:  Positive for chest pain. Negative for palpitations. Gastrointestinal:  Negative for abdominal pain and diarrhea. Endocrine: Negative for polydipsia and polyuria. Genitourinary:  Negative for dysuria and hematuria. Musculoskeletal:  Negative for arthralgias and myalgias. Skin:  Negative for rash and wound. Neurological:  Negative for seizures and syncope. Hematological:  Does not bruise/bleed easily. Psychiatric/Behavioral:  Negative for agitation and hallucinations.         Physical Exam       Patient Vitals for the past 12 hrs:   Temp Pulse Resp BP SpO2   01/18/23 1530 -- 79 26 (!) 200/71 100 %   01/18/23 1455 97.7 °F (36.5 °C) 80 22 (!) 180/89 98 %   01/18/23 1417 98 °F (36.7 °C) 78 22 (!) 167/88 97 %       IPVITALS  Patient Vitals for the past 24 hrs:   BP Temp Pulse Resp SpO2 Height Weight   01/18/23 1530 (!) 200/71 -- 79 26 100 % -- --   01/18/23 1455 (!) 180/89 97.7 °F (36.5 °C) 80 22 98 % 5' 3\" (1.6 m) 117.9 kg (260 lb)   01/18/23 1417 (!) 167/88 98 °F (36.7 °C) 78 22 97 % -- --       Physical Exam  Vitals and nursing note reviewed. Constitutional:       General: She is in acute distress. Appearance: She is well-developed. She is obese. She is ill-appearing. HENT:      Head: Normocephalic and atraumatic. Eyes:      General: No scleral icterus. Conjunctiva/sclera: Conjunctivae normal.   Neck:      Vascular: No JVD. Cardiovascular:      Rate and Rhythm: Normal rate and regular rhythm. Heart sounds: Normal heart sounds. Comments: 4 intact extremity pulses  Pulmonary:      Effort: Pulmonary effort is normal.      Breath sounds: Decreased breath sounds present. Abdominal:      Palpations: Abdomen is soft. There is no mass. Tenderness: There is no abdominal tenderness. Musculoskeletal:         General: Normal range of motion. Cervical back: Normal range of motion and neck supple. Lymphadenopathy:      Cervical: No cervical adenopathy. Skin:     General: Skin is warm and dry. Neurological:      Mental Status: She is alert.          Diagnostic Study Results   Labs -  Recent Results (from the past 24 hour(s))   EKG, 12 LEAD, INITIAL    Collection Time: 01/18/23  2:23 PM   Result Value Ref Range    Ventricular Rate 80 BPM    Atrial Rate 80 BPM    P-R Interval 138 ms    QRS Duration 132 ms    Q-T Interval 408 ms    QTC Calculation (Bezet) 470 ms    Calculated P Axis 50 degrees    Calculated R Axis 79 degrees    Calculated T Axis 6 degrees    Diagnosis       Normal sinus rhythm  Right bundle branch block  Abnormal ECG  When compared with ECG of 10-DEC-2022 15:00,  No significant change was found  Confirmed by Maida Zelaya [id number]) on 1/18/2023 3:49:44 PM     CBC WITH AUTOMATED DIFF    Collection Time: 01/18/23  2:46 PM   Result Value Ref Range    WBC 4.5 (L) 4.6 - 13.2 K/uL    RBC 5.00 4.20 - 5.30 M/uL    HGB 14.3 12.0 - 16.0 g/dL    HCT 42.0 35.0 - 45.0 %    MCV 84.0 78.0 - 100.0 FL    MCH 28.6 24.0 - 34.0 PG    MCHC 34.0 31.0 - 37.0 g/dL    RDW 14.2 11.6 - 14.5 %    PLATELET 723 826 - 625 K/uL    MPV 8.6 (L) 9.2 - 11.8 FL    NRBC 0.0 0  WBC    ABSOLUTE NRBC 0.00 0.00 - 0.01 K/uL    NEUTROPHILS 44 40 - 73 %    LYMPHOCYTES 45 21 - 52 %    MONOCYTES 8 3 - 10 %    EOSINOPHILS 1 0 - 5 %    BASOPHILS 0 0 - 2 %    IMMATURE GRANULOCYTES 1 (H) 0.0 - 0.5 %    ABS. NEUTROPHILS 2.0 1.8 - 8.0 K/UL    ABS. LYMPHOCYTES 2.0 0.9 - 3.6 K/UL    ABS. MONOCYTES 0.4 0.05 - 1.2 K/UL    ABS. EOSINOPHILS 0.1 0.0 - 0.4 K/UL    ABS. BASOPHILS 0.0 0.0 - 0.1 K/UL    ABS. IMM. GRANS. 0.0 0.00 - 0.04 K/UL    DF AUTOMATED     METABOLIC PANEL, COMPREHENSIVE    Collection Time: 01/18/23  2:46 PM   Result Value Ref Range    Sodium 137 136 - 145 mmol/L    Potassium 4.1 3.5 - 5.5 mmol/L    Chloride 101 100 - 111 mmol/L    CO2 31 21 - 32 mmol/L    Anion gap 5 3.0 - 18 mmol/L    Glucose 154 (H) 74 - 99 mg/dL    BUN 12 7.0 - 18 MG/DL    Creatinine 0.98 0.6 - 1.3 MG/DL    BUN/Creatinine ratio 12 12 - 20      eGFR >60 >60 ml/min/1.73m2    Calcium 9.9 8.5 - 10.1 MG/DL    Bilirubin, total 0.7 0.2 - 1.0 MG/DL    ALT (SGPT) 45 13 - 56 U/L    AST (SGOT) 21 10 - 38 U/L    Alk.  phosphatase 88 45 - 117 U/L    Protein, total 7.9 6.4 - 8.2 g/dL    Albumin 3.9 3.4 - 5.0 g/dL    Globulin 4.0 2.0 - 4.0 g/dL    A-G Ratio 1.0 0.8 - 1.7     TROPONIN-HIGH SENSITIVITY    Collection Time: 01/18/23  2:46 PM   Result Value Ref Range    Troponin-High Sensitivity 9 0 - 54 ng/L   NT-PRO BNP    Collection Time: 01/18/23  2:46 PM   Result Value Ref Range    NT pro-BNP 27 0 - 900 PG/ML   RESPIRATORY VIRUS PANEL W/COVID-19, PCR    Collection Time: 01/18/23  3:07 PM    Specimen: Nasopharyngeal   Result Value Ref Range    Adenovirus Not detected NOTD      Coronavirus 229E Not detected NOTD      Coronavirus HKU1 PENDING NOTD    Coronavirus CVNL63 Not detected NOTD      Coronavirus OC43 Not detected NOTD      SARS-CoV-2, PCR Not detected NOTD      Metapneumovirus Not detected NOTD      Rhinovirus and Enterovirus Not detected NOTD      Influenza A Not detected NOTD      Influenza A, subtype H1 Not detected NOTD      Influenza A, subtype H3 Not detected NOTD      INFLUENZA A H1N1 PCR Not detected NOTD      Influenza B Not detected NOTD      Parainfluenza 1 Detected (A) NOTD      Parainfluenza 2 Not detected NOTD      Parainfluenza 3 Not detected NOTD      Parainfluenza virus 4 Not detected NOTD      RSV by PCR Not detected NOTD      B. parapertussis, PCR Not detected NOTD      Bordetella pertussis - PCR Not detected NOTD      Chlamydophila pneumoniae DNA, QL, PCR Not detected NOTD      Mycoplasma pneumoniae DNA, QL, PCR Not detected NOTD         Radiologic Studies -   XR CHEST PORT   Final Result   Bibasilar atelectasis, without evidence of acute cardiopulmonary process         Dictated by Karyn Schrader MD, PGY-2      As the attending radiologist, I have assessed the study images and dictated or   reviewed/edited the final report as needed. XR CHEST PORT    Result Date: 1/18/2023  EXAMINATION: PORTABLE CHEST RADIOGRAPH CLINICAL INDICATION/HISTORY: Shortness of breath   > Additional: None. COMPARISON: Portable chest radiograph from 12/10/2022   > Reference Exam: None. FINDINGS: Mildly limited exam secondary patient body habitus Support Devices: None Bones and Soft tissues: There are no acute osseous abnormalities. Heart and Mediastinum: Borderline enlarged cardiac silhouette, similar prominence of pericardial fat. . Central vasculature within normal limits. Lungs and Pleura: No focal consolidations. Bibasilar atelectasis. No pneumothorax. Bibasilar atelectasis, without evidence of acute cardiopulmonary process Dictated by Karyn Schrader MD, PGY-2 As the attending radiologist, I have assessed the study images and dictated or reviewed/edited the final report as needed.       Medications ordered:   Medications   azithromycin (ZITHROMAX) 500 mg in 0.9% sodium chloride 250 mL (VIAL-MATE) (500 mg IntraVENous New Bag 1/18/23 1624) predniSONE (DELTASONE) tablet 60 mg (has no administration in time range)   methylPREDNISolone (PF) (Solu-MEDROL) injection 125 mg (125 mg IntraVENous Given 1/18/23 1513)   albuterol CONCENTRATE 2.5mg/0.5 mL neb soln (5 mg Nebulization Given 1/18/23 1513)   cefTRIAXone (ROCEPHIN) 1 g in sterile water (preservative free) 10 mL IV syringe (1 g IntraVENous Given 1/18/23 1524)         Medical Decision Making   Initial Medical Decision Making and DDx:  My interpretation sinus rhythm right bundle branch block a rate of 80 narrow complex no acute process    Will give albuterol and Solu-Medrol to cover for asthma and COPD exacerbation. We will start antibiotics possibly pneumonia or also indicated for separate COPD exacerbation. ED Course: Progress Notes, Reevaluation, and Consults:  ED Course as of 01/18/23 1745   Wed Jan 18, 2023   1532 Troponin-High Sensitivity: 9 [AA]   1532 NT pro-BNP: 27 [AA]      ED Course User Index  [AA] Po Adrian     5:45 PM Pt reevaluated at this time. Discussed results and findings, as well as, diagnosis and plan for discharge. Follow up with doctors/services listed. Return to the emergency department for alarming symptoms. Pt verbalizes understanding and agreement with plan. All questions addressed. Reassessed the patient, doing much better eating meal, speaking in full sentences and normal looks 100% better on inspection. She rates it 40% better. She is on her baseline home O2. We will ambulate her with this and look to send her home with steroids and she is very happy with this plan, notes primary care physician appointment already scheduled for 2 days and will return here if she gets worse. I am the first provider for this patient. I reviewed the vital signs, available nursing notes, past medical history, past surgical history, family history and social history.     Patient Vitals for the past 12 hrs:   Temp Pulse Resp BP SpO2   01/18/23 1530 -- 79 26 (!) 200/71 100 % 01/18/23 1455 97.7 °F (36.5 °C) 80 22 (!) 180/89 98 %   01/18/23 1417 98 °F (36.7 °C) 78 22 (!) 167/88 97 %       Vital Signs-Reviewed the patient's vital signs. Pulse Oximetry Analysis, Cardiac Monitor, 12 lead ekg:      Interpreted by the EP. Records Reviewed: Nursing notes reviewed (Time of Review: 3:07 PM)    Procedures:   Critical Care Time: 0  If critical care time is note it is exclusive of any separately billable procedures. Aspirin: (was aspirin given for stroke?)    Diagnosis     Clinical Impression:   1. Parainfluenza virus infection    2. COPD exacerbation ([facility] [address]        Disposition: Discharged      Follow-up Information       Follow up With Specialties Details Why Contact Anette Mcdaniel MD Family Medicine In 2 days  [address] [place] [address]  [phone]               Patient's Medications   Start Taking    PREDNISONE (DELTASONE) 50 MG TABLET    Take 1 Tablet by mouth daily for 5 days. Continue Taking    ACETAMINOPHEN (TYLENOL) 500 MG TABLET    Take 500 mg by mouth every six (6) hours as needed for Fever or Pain. ALBUTEROL (PROVENTIL HFA, VENTOLIN HFA, PROAIR HFA) 90 MCG/ACTUATION INHALER    Take 2 Puffs by inhalation every four (4) hours as needed for Wheezing. ALBUTEROL-IPRATROPIUM (DUO-NEB) 2.5 MG-0.5 MG/3 ML NEBU    3 mL by Nebulization route every four (4) hours as needed for Wheezing. ASPIRIN DELAYED-RELEASE 81 MG TABLET    Take 81 mg by mouth daily. AZELASTINE (ASTELIN) 137 MCG (0.1 %) NASAL SPRAY    2 Sprays by Both Nostrils route as needed. AZITHROMYCIN (ZITHROMAX) 250 MG TABLET    TAKE 1 TABLET BY MOUTH ON MONDAY, WEDNESDAY AND FRIDAY    BLOOD-GLUCOSE METER MONITORING KIT    CHECK BLOOD SUGARS B.I.D. E11.9    BUDESONIDE (PULMICORT) 1 MG/2 ML NBSP    Take 2 mL by inhalation daily. CHOLECALCIFEROL (VITAMIN D3) (2,000 UNITS /50 MCG) CAP CAPSULE    Take 5,000 Units by mouth every seven (7) days.     EPINEPHRINE (EPIPEN) 0.3 MG/0.3 ML INJECTION    0.3 mg by IntraMUSCular route once as needed. FAMOTIDINE (PEPCID) 20 MG TABLET    Take 20 mg by mouth as needed for Heartburn or Gastroesophageal Reflux Disease (GERD). As needed for breakthrough heartburn    FLUTICASONE PROPION-SALMETEROL (ADVAIR HFA) 230-21 MCG/ACTUATION INHALER    Take 2 Puffs by inhalation two (2) times a day. Rinse and gargle after each use. Disp: 3 month    FLUTICASONE PROPIONATE (FLONASE) 50 MCG/ACTUATION NASAL SPRAY    2 Sprays by Both Nostrils route daily. FUROSEMIDE (LASIX) 40 MG TABLET    Take 1 tablet by mouth twice daily    INSULIN LISPRO (HUMALOG) 100 UNIT/ML KWIKPEN    6-12 Units. INSULIN NEEDLES, DISPOSABLE, 31 GAUGE X 3/16\" NDLE    Use with Basaglar once daily. INSULIN SYRINGE-NEEDLE U-100 0.3 ML 31 GAUGE X 5/16\" SYRG    USE AS DIRECTED    LANCETS (ACCU-CHEK SOFTCLIX LANCETS) MISC    Please use one lancet every time you check your blood sugar    LISINOPRIL (PRINIVIL, ZESTRIL) 20 MG TABLET    Take 10 mg by mouth daily. MONTELUKAST (SINGULAIR) 10 MG TABLET    Take 1 tablet by mouth nightly    OMEPRAZOLE (PRILOSEC) 40 MG CAPSULE    Take 40 mg by mouth daily. OXYGEN-AIR DELIVERY SYSTEMS    2 L by Nasal route continuous. First Choice between 2L and 3L    POLYETHYLENE GLYCOL (MIRALAX) 17 GRAM/DOSE POWDER    Take 17 g by mouth as needed. POTASSIUM CHLORIDE (K-DUR, KLOR-CON M20) 20 MEQ TABLET    Take 20 mEq by mouth daily. SIMETHICONE (GAS-X) 125 MG CAPSULE    Take 125 mg by mouth four (4) times daily as needed for Flatulence.     SPIRIVA RESPIMAT 2.5 MCG/ACTUATION INHALER    INHALE 2 SPRAY(S) BY MOUTH ONCE DAILY   These Medications have changed    No medications on file   Stop Taking    No medications on file     _______________________________    Notes:    Bela Mehta MD using Dragon dictation      _______________________________

## 2023-01-18 NOTE — ED NOTES
Pt medicated per [facility], reports she still feels \"really winded\". MD made aware, pt was possible up for discharge but doesn't feel well. Dr. Bonifacio Romero made aware.

## 2023-01-19 ENCOUNTER — APPOINTMENT (OUTPATIENT)
Dept: NON INVASIVE DIAGNOSTICS | Age: 63
DRG: 190 | End: 2023-01-19
Attending: INTERNAL MEDICINE
Payer: MEDICARE

## 2023-01-19 LAB
ALBUMIN SERPL-MCNC: 3.9 G/DL (ref 3.4–5)
ALBUMIN/GLOB SERPL: 1.1 (ref 0.8–1.7)
ALP SERPL-CCNC: 82 U/L (ref 45–117)
ALT SERPL-CCNC: 39 U/L (ref 13–56)
ANION GAP SERPL CALC-SCNC: 8 MMOL/L (ref 3–18)
AST SERPL-CCNC: 16 U/L (ref 10–38)
BASOPHILS # BLD: 0 K/UL (ref 0–0.1)
BASOPHILS NFR BLD: 0 % (ref 0–2)
BILIRUB SERPL-MCNC: 0.9 MG/DL (ref 0.2–1)
BNP SERPL-MCNC: 39 PG/ML (ref 0–900)
BUN SERPL-MCNC: 16 MG/DL (ref 7–18)
BUN/CREAT SERPL: 16 (ref 12–20)
CALCIUM SERPL-MCNC: 10.2 MG/DL (ref 8.5–10.1)
CHLORIDE SERPL-SCNC: 101 MMOL/L (ref 100–111)
CO2 SERPL-SCNC: 25 MMOL/L (ref 21–32)
CREAT SERPL-MCNC: 0.97 MG/DL (ref 0.6–1.3)
DIFFERENTIAL METHOD BLD: ABNORMAL
ECHO AV AREA PEAK VELOCITY: 1.4 CM2
ECHO AV AREA/BSA PEAK VELOCITY: 0.6 CM2/M2
ECHO AV MEAN GRADIENT: 10 MMHG
ECHO AV MEAN VELOCITY: 1.5 M/S
ECHO AV PEAK GRADIENT: 18 MMHG
ECHO AV PEAK VELOCITY: 2.1 M/S
ECHO AV VELOCITY RATIO: 0.48
ECHO AV VTI: 35.7 CM
ECHO LV FRACTIONAL SHORTENING: 43 % (ref 28–44)
ECHO LV INTERNAL DIMENSION DIASTOLE INDEX: 2.18 CM/M2
ECHO LV INTERNAL DIMENSION DIASTOLIC: 4.7 CM (ref 3.9–5.3)
ECHO LV INTERNAL DIMENSION SYSTOLIC INDEX: 1.25 CM/M2
ECHO LV INTERNAL DIMENSION SYSTOLIC: 2.7 CM
ECHO LV IVSD: 1.5 CM (ref 0.6–0.9)
ECHO LV MASS 2D: 237.9 G (ref 67–162)
ECHO LV MASS INDEX 2D: 110.1 G/M2 (ref 43–95)
ECHO LV POSTERIOR WALL DIASTOLIC: 1.1 CM (ref 0.6–0.9)
ECHO LV RELATIVE WALL THICKNESS RATIO: 0.47
ECHO LVOT AREA: 3.1 CM2
ECHO LVOT DIAM: 2 CM
ECHO LVOT PEAK GRADIENT: 4 MMHG
ECHO LVOT PEAK VELOCITY: 1 M/S
ECHO MV A VELOCITY: 1.29 M/S
ECHO MV E DECELERATION TIME (DT): 187.1 MS
ECHO MV E VELOCITY: 1.05 M/S
ECHO MV E/A RATIO: 0.81
EOSINOPHIL # BLD: 0 K/UL (ref 0–0.4)
EOSINOPHIL NFR BLD: 0 % (ref 0–5)
ERYTHROCYTE [DISTWIDTH] IN BLOOD BY AUTOMATED COUNT: 14.4 % (ref 11.6–14.5)
FOLATE SERPL-MCNC: 12.3 NG/ML (ref 3.1–17.5)
GLOBULIN SER CALC-MCNC: 3.6 G/DL (ref 2–4)
GLUCOSE BLD STRIP.AUTO-MCNC: 232 MG/DL (ref 70–110)
GLUCOSE BLD STRIP.AUTO-MCNC: 257 MG/DL (ref 70–110)
GLUCOSE BLD STRIP.AUTO-MCNC: 309 MG/DL (ref 70–110)
GLUCOSE BLD STRIP.AUTO-MCNC: 314 MG/DL (ref 70–110)
GLUCOSE BLD STRIP.AUTO-MCNC: 346 MG/DL (ref 70–110)
GLUCOSE SERPL-MCNC: 246 MG/DL (ref 74–99)
HCT VFR BLD AUTO: 41.6 % (ref 35–45)
HGB BLD-MCNC: 13.9 G/DL (ref 12–16)
IMM GRANULOCYTES # BLD AUTO: 0 K/UL (ref 0–0.04)
IMM GRANULOCYTES NFR BLD AUTO: 1 % (ref 0–0.5)
LYMPHOCYTES # BLD: 1.5 K/UL (ref 0.9–3.6)
LYMPHOCYTES NFR BLD: 19 % (ref 21–52)
MAGNESIUM SERPL-MCNC: 2.1 MG/DL (ref 1.6–2.6)
MCH RBC QN AUTO: 28.7 PG (ref 24–34)
MCHC RBC AUTO-ENTMCNC: 33.4 G/DL (ref 31–37)
MCV RBC AUTO: 86 FL (ref 78–100)
MONOCYTES # BLD: 0.4 K/UL (ref 0.05–1.2)
MONOCYTES NFR BLD: 5 % (ref 3–10)
NEUTS SEG # BLD: 5.9 K/UL (ref 1.8–8)
NEUTS SEG NFR BLD: 75 % (ref 40–73)
NRBC # BLD: 0 K/UL (ref 0–0.01)
NRBC BLD-RTO: 0 PER 100 WBC
PLATELET # BLD AUTO: 314 K/UL (ref 135–420)
PMV BLD AUTO: 8.8 FL (ref 9.2–11.8)
POTASSIUM SERPL-SCNC: 5.1 MMOL/L (ref 3.5–5.5)
PROCALCITONIN SERPL-MCNC: <0.05 NG/ML
PROT SERPL-MCNC: 7.5 G/DL (ref 6.4–8.2)
RBC # BLD AUTO: 4.84 M/UL (ref 4.2–5.3)
SODIUM SERPL-SCNC: 134 MMOL/L (ref 136–145)
TROPONIN-HIGH SENSITIVITY: 6 NG/L (ref 0–54)
TSH SERPL DL<=0.05 MIU/L-ACNC: 0.67 UIU/ML (ref 0.36–3.74)
VIT B12 SERPL-MCNC: 867 PG/ML (ref 211–911)
WBC # BLD AUTO: 7.9 K/UL (ref 4.6–13.2)

## 2023-01-19 PROCEDURE — 84145 PROCALCITONIN (PCT): CPT

## 2023-01-19 PROCEDURE — 74011250637 HC RX REV CODE- 250/637: Performed by: STUDENT IN AN ORGANIZED HEALTH CARE EDUCATION/TRAINING PROGRAM

## 2023-01-19 PROCEDURE — 97161 PT EVAL LOW COMPLEX 20 MIN: CPT

## 2023-01-19 PROCEDURE — 83880 ASSAY OF NATRIURETIC PEPTIDE: CPT

## 2023-01-19 PROCEDURE — 93308 TTE F-UP OR LMTD: CPT | Performed by: INTERNAL MEDICINE

## 2023-01-19 PROCEDURE — 97165 OT EVAL LOW COMPLEX 30 MIN: CPT

## 2023-01-19 PROCEDURE — 74011636637 HC RX REV CODE- 636/637: Performed by: STUDENT IN AN ORGANIZED HEALTH CARE EDUCATION/TRAINING PROGRAM

## 2023-01-19 PROCEDURE — 82962 GLUCOSE BLOOD TEST: CPT

## 2023-01-19 PROCEDURE — 93321 DOPPLER ECHO F-UP/LMTD STD: CPT | Performed by: INTERNAL MEDICINE

## 2023-01-19 PROCEDURE — 85025 COMPLETE CBC W/AUTO DIFF WBC: CPT

## 2023-01-19 PROCEDURE — 74011000250 HC RX REV CODE- 250: Performed by: STUDENT IN AN ORGANIZED HEALTH CARE EDUCATION/TRAINING PROGRAM

## 2023-01-19 PROCEDURE — 82607 VITAMIN B-12: CPT

## 2023-01-19 PROCEDURE — 74011250636 HC RX REV CODE- 250/636: Performed by: HOSPITALIST

## 2023-01-19 PROCEDURE — 65270000046 HC RM TELEMETRY

## 2023-01-19 PROCEDURE — 84443 ASSAY THYROID STIM HORMONE: CPT

## 2023-01-19 PROCEDURE — 74011250636 HC RX REV CODE- 250/636: Performed by: INTERNAL MEDICINE

## 2023-01-19 PROCEDURE — 84484 ASSAY OF TROPONIN QUANT: CPT

## 2023-01-19 PROCEDURE — 74011636637 HC RX REV CODE- 636/637: Performed by: INTERNAL MEDICINE

## 2023-01-19 PROCEDURE — 80053 COMPREHEN METABOLIC PANEL: CPT

## 2023-01-19 PROCEDURE — 74011250637 HC RX REV CODE- 250/637: Performed by: INTERNAL MEDICINE

## 2023-01-19 PROCEDURE — 74011636637 HC RX REV CODE- 636/637: Performed by: HOSPITALIST

## 2023-01-19 PROCEDURE — 83735 ASSAY OF MAGNESIUM: CPT

## 2023-01-19 PROCEDURE — 93325 DOPPLER ECHO COLOR FLOW MAPG: CPT | Performed by: INTERNAL MEDICINE

## 2023-01-19 PROCEDURE — 74011000250 HC RX REV CODE- 250: Performed by: INTERNAL MEDICINE

## 2023-01-19 PROCEDURE — C8923 2D TTE W OR W/O FOL W/CON,CO: HCPCS

## 2023-01-19 RX ORDER — ENOXAPARIN SODIUM 100 MG/ML
30 INJECTION SUBCUTANEOUS 2 TIMES DAILY
Status: DISCONTINUED | OUTPATIENT
Start: 2023-01-19 | End: 2023-01-22 | Stop reason: HOSPADM

## 2023-01-19 RX ORDER — ENOXAPARIN SODIUM 100 MG/ML
40 INJECTION SUBCUTANEOUS DAILY
Status: DISCONTINUED | OUTPATIENT
Start: 2023-01-19 | End: 2023-01-19

## 2023-01-19 RX ORDER — PREDNISONE 20 MG/1
40 TABLET ORAL
Status: DISCONTINUED | OUTPATIENT
Start: 2023-01-20 | End: 2023-01-19

## 2023-01-19 RX ORDER — ONDANSETRON 2 MG/ML
4 INJECTION INTRAMUSCULAR; INTRAVENOUS
Status: DISCONTINUED | OUTPATIENT
Start: 2023-01-19 | End: 2023-01-22 | Stop reason: HOSPADM

## 2023-01-19 RX ORDER — FUROSEMIDE 40 MG/1
40 TABLET ORAL 2 TIMES DAILY
Status: DISCONTINUED | OUTPATIENT
Start: 2023-01-19 | End: 2023-01-22 | Stop reason: HOSPADM

## 2023-01-19 RX ORDER — DEXTROSE MONOHYDRATE 100 MG/ML
0-250 INJECTION, SOLUTION INTRAVENOUS AS NEEDED
Status: DISCONTINUED | OUTPATIENT
Start: 2023-01-19 | End: 2023-01-22 | Stop reason: SDUPTHER

## 2023-01-19 RX ORDER — ONDANSETRON 4 MG/1
4 TABLET, ORALLY DISINTEGRATING ORAL
Status: DISCONTINUED | OUTPATIENT
Start: 2023-01-19 | End: 2023-01-22 | Stop reason: HOSPADM

## 2023-01-19 RX ORDER — IBUPROFEN 200 MG
16 TABLET ORAL AS NEEDED
Status: DISCONTINUED | OUTPATIENT
Start: 2023-01-19 | End: 2023-01-22 | Stop reason: HOSPADM

## 2023-01-19 RX ORDER — POLYETHYLENE GLYCOL 3350 17 G/17G
17 POWDER, FOR SOLUTION ORAL DAILY PRN
Status: DISCONTINUED | OUTPATIENT
Start: 2023-01-19 | End: 2023-01-22 | Stop reason: HOSPADM

## 2023-01-19 RX ORDER — PANTOPRAZOLE SODIUM 40 MG/1
40 TABLET, DELAYED RELEASE ORAL DAILY
Status: DISCONTINUED | OUTPATIENT
Start: 2023-01-19 | End: 2023-01-22 | Stop reason: HOSPADM

## 2023-01-19 RX ORDER — ARFORMOTEROL TARTRATE 15 UG/2ML
15 SOLUTION RESPIRATORY (INHALATION)
Status: DISCONTINUED | OUTPATIENT
Start: 2023-01-19 | End: 2023-01-20

## 2023-01-19 RX ORDER — SIMETHICONE 80 MG
125 TABLET,CHEWABLE ORAL
Status: DISCONTINUED | OUTPATIENT
Start: 2023-01-19 | End: 2023-01-22 | Stop reason: HOSPADM

## 2023-01-19 RX ORDER — LISINOPRIL 10 MG/1
10 TABLET ORAL DAILY
Status: DISCONTINUED | OUTPATIENT
Start: 2023-01-19 | End: 2023-01-22 | Stop reason: HOSPADM

## 2023-01-19 RX ORDER — INSULIN LISPRO 100 [IU]/ML
INJECTION, SOLUTION INTRAVENOUS; SUBCUTANEOUS
Status: DISCONTINUED | OUTPATIENT
Start: 2023-01-19 | End: 2023-01-20

## 2023-01-19 RX ORDER — SODIUM CHLORIDE 0.9 % (FLUSH) 0.9 %
5-40 SYRINGE (ML) INJECTION AS NEEDED
Status: DISCONTINUED | OUTPATIENT
Start: 2023-01-19 | End: 2023-01-22 | Stop reason: HOSPADM

## 2023-01-19 RX ORDER — ASPIRIN 81 MG/1
81 TABLET ORAL DAILY
Status: DISCONTINUED | OUTPATIENT
Start: 2023-01-19 | End: 2023-01-22 | Stop reason: HOSPADM

## 2023-01-19 RX ORDER — AZITHROMYCIN 250 MG/1
250 TABLET, FILM COATED ORAL
Status: DISCONTINUED | OUTPATIENT
Start: 2023-01-20 | End: 2023-01-22 | Stop reason: HOSPADM

## 2023-01-19 RX ORDER — MONTELUKAST SODIUM 10 MG/1
10 TABLET ORAL
Status: DISCONTINUED | OUTPATIENT
Start: 2023-01-19 | End: 2023-01-22 | Stop reason: HOSPADM

## 2023-01-19 RX ORDER — IPRATROPIUM BROMIDE AND ALBUTEROL SULFATE 2.5; .5 MG/3ML; MG/3ML
3 SOLUTION RESPIRATORY (INHALATION)
Status: DISCONTINUED | OUTPATIENT
Start: 2023-01-19 | End: 2023-01-19

## 2023-01-19 RX ORDER — POLYETHYLENE GLYCOL 3350 17 G/17G
17 POWDER, FOR SOLUTION ORAL AS NEEDED
Status: DISCONTINUED | OUTPATIENT
Start: 2023-01-19 | End: 2023-01-19

## 2023-01-19 RX ORDER — POLYETHYLENE GLYCOL 3350 17 G/17G
17 POWDER, FOR SOLUTION ORAL DAILY PRN
Status: DISCONTINUED | OUTPATIENT
Start: 2023-01-19 | End: 2023-01-19

## 2023-01-19 RX ORDER — BUDESONIDE 1 MG/2ML
1000 INHALANT ORAL 2 TIMES DAILY
Status: DISCONTINUED | OUTPATIENT
Start: 2023-01-19 | End: 2023-01-20

## 2023-01-19 RX ORDER — IPRATROPIUM BROMIDE 0.5 MG/2.5ML
0.5 SOLUTION RESPIRATORY (INHALATION) EVERY 8 HOURS
Status: DISCONTINUED | OUTPATIENT
Start: 2023-01-19 | End: 2023-01-20

## 2023-01-19 RX ORDER — INSULIN GLARGINE 100 [IU]/ML
65 INJECTION, SOLUTION SUBCUTANEOUS
COMMUNITY

## 2023-01-19 RX ORDER — INSULIN GLARGINE 100 [IU]/ML
10 INJECTION, SOLUTION SUBCUTANEOUS DAILY
Status: DISCONTINUED | OUTPATIENT
Start: 2023-01-19 | End: 2023-01-22 | Stop reason: HOSPADM

## 2023-01-19 RX ORDER — AZITHROMYCIN 250 MG/1
250 TABLET, FILM COATED ORAL DAILY
Status: DISCONTINUED | OUTPATIENT
Start: 2023-01-19 | End: 2023-01-19

## 2023-01-19 RX ORDER — INSULIN GLARGINE 100 [IU]/ML
50 INJECTION, SOLUTION SUBCUTANEOUS
Status: DISCONTINUED | OUTPATIENT
Start: 2023-01-19 | End: 2023-01-21

## 2023-01-19 RX ORDER — INSULIN GLARGINE 100 [IU]/ML
10 INJECTION, SOLUTION SUBCUTANEOUS EVERY MORNING
COMMUNITY

## 2023-01-19 RX ORDER — ACETAMINOPHEN 325 MG/1
650 TABLET ORAL
Status: DISCONTINUED | OUTPATIENT
Start: 2023-01-19 | End: 2023-01-22 | Stop reason: HOSPADM

## 2023-01-19 RX ORDER — MAGNESIUM SULFATE 1 G/100ML
1 INJECTION INTRAVENOUS ONCE
Status: COMPLETED | OUTPATIENT
Start: 2023-01-19 | End: 2023-01-19

## 2023-01-19 RX ORDER — SODIUM CHLORIDE 0.9 % (FLUSH) 0.9 %
5-40 SYRINGE (ML) INJECTION EVERY 8 HOURS
Status: DISCONTINUED | OUTPATIENT
Start: 2023-01-19 | End: 2023-01-22 | Stop reason: HOSPADM

## 2023-01-19 RX ADMIN — AZITHROMYCIN MONOHYDRATE 250 MG: 250 TABLET ORAL at 09:16

## 2023-01-19 RX ADMIN — METHYLPREDNISOLONE SODIUM SUCCINATE 40 MG: 40 INJECTION, POWDER, FOR SOLUTION INTRAMUSCULAR; INTRAVENOUS at 22:56

## 2023-01-19 RX ADMIN — FUROSEMIDE 40 MG: 40 TABLET ORAL at 11:36

## 2023-01-19 RX ADMIN — MONTELUKAST 10 MG: 10 TABLET, FILM COATED ORAL at 22:56

## 2023-01-19 RX ADMIN — MAGNESIUM SULFATE HEPTAHYDRATE 1 G: 1 INJECTION, SOLUTION INTRAVENOUS at 01:25

## 2023-01-19 RX ADMIN — SODIUM CHLORIDE, PRESERVATIVE FREE 5 ML: 5 INJECTION INTRAVENOUS at 14:00

## 2023-01-19 RX ADMIN — BUDESONIDE 1000 MCG: 1 SUSPENSION RESPIRATORY (INHALATION) at 17:38

## 2023-01-19 RX ADMIN — METHYLPREDNISOLONE SODIUM SUCCINATE 40 MG: 40 INJECTION, POWDER, FOR SOLUTION INTRAMUSCULAR; INTRAVENOUS at 17:37

## 2023-01-19 RX ADMIN — IPRATROPIUM BROMIDE 0.5 MG: 0.5 SOLUTION RESPIRATORY (INHALATION) at 11:36

## 2023-01-19 RX ADMIN — METHYLPREDNISOLONE SODIUM SUCCINATE 125 MG: 125 INJECTION, POWDER, FOR SOLUTION INTRAMUSCULAR; INTRAVENOUS at 09:16

## 2023-01-19 RX ADMIN — ARFORMOTEROL TARTRATE 15 MCG: 15 SOLUTION RESPIRATORY (INHALATION) at 20:14

## 2023-01-19 RX ADMIN — Medication 4 UNITS: at 11:36

## 2023-01-19 RX ADMIN — FUROSEMIDE 40 MG: 40 TABLET ORAL at 17:37

## 2023-01-19 RX ADMIN — Medication 6 UNITS: at 07:30

## 2023-01-19 RX ADMIN — ENOXAPARIN SODIUM 30 MG: 100 INJECTION SUBCUTANEOUS at 09:16

## 2023-01-19 RX ADMIN — Medication 50 UNITS: at 22:56

## 2023-01-19 RX ADMIN — SODIUM CHLORIDE, PRESERVATIVE FREE 10 ML: 5 INJECTION INTRAVENOUS at 09:16

## 2023-01-19 RX ADMIN — ARFORMOTEROL TARTRATE 15 MCG: 15 SOLUTION RESPIRATORY (INHALATION) at 11:36

## 2023-01-19 RX ADMIN — Medication 8 UNITS: at 16:50

## 2023-01-19 RX ADMIN — PERFLUTREN 2 ML: 6.52 INJECTION, SUSPENSION INTRAVENOUS at 10:05

## 2023-01-19 RX ADMIN — BUDESONIDE 1000 MCG: 1 SUSPENSION RESPIRATORY (INHALATION) at 11:36

## 2023-01-19 RX ADMIN — IPRATROPIUM BROMIDE 0.5 MG: 0.5 SOLUTION RESPIRATORY (INHALATION) at 17:37

## 2023-01-19 RX ADMIN — MONTELUKAST 10 MG: 10 TABLET, FILM COATED ORAL at 01:25

## 2023-01-19 RX ADMIN — SODIUM CHLORIDE, PRESERVATIVE FREE 10 ML: 5 INJECTION INTRAVENOUS at 01:36

## 2023-01-19 RX ADMIN — ENOXAPARIN SODIUM 30 MG: 100 INJECTION SUBCUTANEOUS at 17:37

## 2023-01-19 RX ADMIN — ASPIRIN 81 MG: 81 TABLET, COATED ORAL at 09:16

## 2023-01-19 RX ADMIN — LISINOPRIL 10 MG: 10 TABLET ORAL at 09:16

## 2023-01-19 RX ADMIN — PANTOPRAZOLE SODIUM 40 MG: 40 TABLET, DELAYED RELEASE ORAL at 09:16

## 2023-01-19 RX ADMIN — Medication 10 UNITS: at 17:37

## 2023-01-19 NOTE — PROGRESS NOTES
Problem: Mobility Impaired (Adult and Pediatric)  Goal: *Acute Goals and Plan of Care (Insert Text)  Description: Physical Therapy Goals  Initiated 1/19/2023 and to be accomplished within 7 day(s)  1. Patient will move from supine to sit and sit to supine , scoot up and down, and roll side to side in bed with modified independence. 2.  Patient will transfer from bed to chair and chair to bed with modified independence using the least restrictive device. 3.  Patient will perform sit to stand with modified independence. 4.  Patient will ambulate with modified independence for 150 feet with the least restrictive device. 5.  Patient will ascend/descend stairs as needed     PLOF: pt lives with daughter in a 1 SH, uses rollator and cane at baseline, sup/mod ind PTA, on home O2, has caregivers 5x/week     Outcome: Progressing Towards Goal     PHYSICAL THERAPY EVALUATION    Patient: Kristine Yu (49 y.o. female)  Date: 1/19/2023  Primary Diagnosis: Acute exacerbation of chronic obstructive pulmonary disease (COPD) ([facility] [address] [J44.1]       Precautions:   Fall  WBAT  PLOF: see above     ASSESSMENT :  Based on the objective data described below, the patient presents with generalized weakness and activity tolerance, decreased functional mobility from baseline. Pt seen in bed in NAD, on 3L via NC. Pt O2 maintains >95% during session with activity however continues to have SOB and CHAUDHARY with amb next to bed, RR up to 28 at highest, educated on PLB and energy conservation. Pt left sitting EOB with all needs met, will continue to follow per POC in the acute setting. Patient will benefit from skilled intervention to address the above impairments.   Patient's rehabilitation potential is considered to be Good  Factors which may influence rehabilitation potential include:   []         None noted  []         Mental ability/status  [x]         Medical condition  []         Home/family situation and support systems  [] Safety awareness  []         Pain tolerance/management  []         Other:      PLAN :  Recommendations and Planned Interventions:   [x]           Bed Mobility Training             [x]    Neuromuscular Re-Education  [x]           Transfer Training                   []    Orthotic/Prosthetic Training  [x]           Gait Training                          []    Modalities  [x]           Therapeutic Exercises           []    Edema Management/Control  [x]           Therapeutic Activities            [x]    Family Training/Education  [x]           Patient Education  []           Other (comment):    Frequency/Duration: Patient will be followed by physical therapy 1-2 times per day/4-7 days per week to address goals. Further Equipment Recommendations for Discharge: N/A- pt has all DME     AMPAC:   Current research shows that an AM-PAC score of 18 (14 without stairs) or greater is associated with a discharge to the patient's home setting. Based on an AM-PAC score of 18/24 (or **/20 if omitting stairs) and their current functional mobility deficits, it is recommended that the patient have 2-3 sessions per week of Physical Therapy at d/c to increase the patient's independence. This AMPAC score should be considered in conjunction with interdisciplinary team recommendations to determine the most appropriate discharge setting. Patient's social support, diagnosis, medical stability, and prior level of function should also be taken into consideration. SUBJECTIVE:   Patient stated I have been here before.     OBJECTIVE DATA SUMMARY:     Past Medical History:   Diagnosis Date    Asthma     Chronic lung disease     COPD     Cystocele, midline     Diabetes mellitus ([facility] [address]     GERD (gastroesophageal reflux disease)     Hidradenitis suppurativa     Hyperlipidemia     Hypertension     SHERRIE on CPAP     CPAP    Stress incontinence      Past Surgical History:   Procedure Laterality Date    HX APPENDECTOMY      HX CATARACT REMOVAL Right     HX CHOLECYSTECTOMY      HX DILATION AND CURETTAGE      Dysfunctional uterine bleeding, thought 2/2 fibroids    HX HEART CATHETERIZATION  12/2020    HX TUBAL LIGATION      ME UNLISTED PROCEDURE BREAST      Right breast benign tumor removal     Barriers to Learning/Limitations: yes;  physical  Compensate with: Visual Cues, Verbal Cues, and Tactile Cues  Home Situation:  Home Situation  Home Environment: Private residence  One/Two Story Residence: One story  Living Alone: No  Support Systems: Child(kim)  Current DME Used/Available at Home: [employer], straight, Walker, rollator, Oxygen, portable  Critical Behavior:  Neurologic State: Alert  Orientation Level: Oriented X4  Cognition: Follows commands  Safety/Judgement: Fall prevention  Psychosocial  Patient Behaviors: Calm; Cooperative                 Strength:    Strength: Generally decreased, functional                    Tone & Sensation:   Tone: Normal    Sensation: Intact           Range Of Motion:  AROM: Within functional limits       Functional Mobility:  Bed Mobility:     Supine to Sit: Supervision  Sit to Supine: Supervision  Scooting: Supervision  Transfers:  Sit to Stand: Stand-by assistance  Stand to Sit: Stand-by assistance       Balance:   Sitting: Intact  Standing: Intact    Ambulation/Gait Training:  Distance (ft): 7 Feet (ft)  Assistive Device: Walker, rolling  Ambulation - Level of Assistance: Contact guard assistance        Gait Abnormalities: Decreased step clearance       Speed/Pam: Slow;Shuffled  Step Length: Right shortened;Left shortened    Pain:  Pain level pre-treatment: 0/10   Pain level post-treatment: 0/10   Pain Intervention(s) : Medication (see MAR); Rest, Ice, Repositioning  Response to intervention: Nurse notified    Activity Tolerance:   Good    Please refer to the flowsheet for vital signs taken during this treatment.   After treatment:   []         Patient left in no apparent distress sitting up in chair  [x]         Patient left in no apparent distress in bed  [x]         Call bell left within reach  [x]         Nursing notified  []         Caregiver present  []         Bed alarm activated  []         SCDs applied    COMMUNICATION/EDUCATION:   [x]         Role of Physical Therapy in the acute care setting. [x]         Fall prevention education was provided and the patient/caregiver indicated understanding. [x]         Patient/family have participated as able in goal setting and plan of care. [x]         Patient/family agree to work toward stated goals and plan of care. []         Patient understands intent and goals of therapy, but is neutral about his/her participation. []         Patient is unable to participate in goal setting/plan of care: ongoing with therapy staff.  []         Other: Thank you for this referral.  Merrill Rich   Time Calculation: 14 mins      Eval Complexity: History: MEDIUM  Complexity : 1-2 comorbidities / personal factors will impact the outcome/ POC Exam:MEDIUM Complexity : 3 Standardized tests and measures addressing body structure, function, activity limitation and / or participation in recreation  Presentation: MEDIUM Complexity : Evolving with changing characteristics  Clinical Decision Making:Medium Complexity    Overall Complexity:MEDIUM    Augusto Huger AM-PAC® Basic Mobility Inpatient Short Form (6-Clicks) Version 2    How much HELP from another person does the patient currently need    (If the patient hasn't done an activity recently, how much help from another person do you think he/she would need if he/she tried?)   Total (Total A or Dep)   A Lot  (Mod to Max A)   A Little (Sup or Min A)   None (Mod I to I)   Turning from your back to your side while in a flat bed without using bedrails? [] 1 [] 2 [x] 3 [] 4   2. Moving from lying on your back to sitting on the side of a flat bed without using bedrails? [] 1 [] 2 [x] 3 [] 4   3.  Moving to and from a bed to a chair (including a wheelchair)? [] 1 [] 2 [x] 3 [] 4   4. Standing up from a chair using your arms (e.g., wheelchair, or bedside chair)? [] 1 [] 2 [x] 3 [] 4   5. Walking in hospital room? [] 1 [] 2 [x] 3 [] 4   6. Climbing 3-5 steps with a railing?+   [] 1 [] 2 [x] 3 [] 4   +If stair climbing cannot be assessed, skip item #6. Sum responses from items 1-5. Current research shows that an AM-PAC score of 18 (14 without stairs) or greater is associated with a discharge to the patient's home setting. Based on an AM-PAC score of 18/24 (or **/20 if omitting stairs) and their current functional mobility deficits, it is recommended that the patient have 2-3 sessions per week of Physical Therapy at d/c to increase the patient's independence.

## 2023-01-19 NOTE — PROGRESS NOTES
Problem: Self Care Deficits Care Plan (Adult)  Goal: *Acute Goals and Plan of Care (Insert Text)  Outcome: Resolved/Met     OCCUPATIONAL THERAPY EVALUATION/DISCHARGE    Patient: Michelle Thorpe (73 y.o. female)  Date: 1/19/2023  Primary Diagnosis: Acute exacerbation of chronic obstructive pulmonary disease (COPD) ([facility] [J44.1]  Precautions:  Fall  PLOF: Pt needs assist for ADLs d/t decreased endurance and uses rollator. Patient reports she has AE for LB ADLs prn. ASSESSMENT AND RECOMMENDATIONS:  Based on the objective data described below, the patient presents at functional baseline for self-care tasks, functional transfers and functional mobility in preparation for ADLs. Patient supervision for functional mobility/transfers in preparation for ADLs and modified/independent for UB ADLs this session. Patient baseline is assist/ set-up for all care and tasks. Patient at this time with no ADL concerns and no skilled OT needs indicated. OT to d/c order as patient has supportive family and PCA at home. Skilled occupational therapy is not indicated at this time. Further Equipment Recommendations for Discharge: Patient has all DME    AMPAC: At this time and based on an AM-PAC score of 22/24, no further OT is recommended upon discharge due to patient at functional baseline for ADLs. Recommend patient returns to prior setting with prior services. This AMPAC score should be considered in conjunction with interdisciplinary team recommendations to determine the most appropriate discharge setting. Patient's social support, diagnosis, medical stability, and prior level of function should also be taken into consideration.      SUBJECTIVE:   Patient stated I dont think I need OT, I've been through this before and I have care aids, one from medicaid and one from medicare to help    OBJECTIVE DATA SUMMARY:     Past Medical History:   Diagnosis Date    Asthma     Chronic lung disease     COPD     Cystocele, midline     Diabetes mellitus (HCC)     GERD (gastroesophageal reflux disease)     Hidradenitis suppurativa     Hyperlipidemia     Hypertension     SHERRIE on CPAP     CPAP    Stress incontinence      Past Surgical History:   Procedure Laterality Date    HX APPENDECTOMY      HX CATARACT REMOVAL Right     HX CHOLECYSTECTOMY      HX DILATION AND CURETTAGE      Dysfunctional uterine bleeding, thought 2/2 fibroids    HX HEART CATHETERIZATION  12/2020    HX TUBAL LIGATION      [place] UNLISTED PROCEDURE BREAST      Right breast benign tumor removal     Barriers to Learning/Limitations: None  Compensate with: visual, verbal, tactile, kinesthetic cues/model    Home Situation:   Home Situation  Home Environment: Private residence  One/Two Story Residence: One story  Living Alone: No  Support Systems: Child(kim)  Current DME Used/Available at Home: Cane, straight, Walker, rollator, Oxygen, portable  [x]     Right hand dominant   []     Left hand dominant    Cognitive/Behavioral Status:  Neurologic State: Alert  Orientation Level: Oriented X4  Cognition: Follows commands  Safety/Judgement: Fall prevention; Awareness of environment    Skin: intact  Edema: None noted    Vision/Perceptual:    Acuity: Within Defined Limits      Coordination: BUE  Coordination: Within functional limits  Fine Motor Skills-Upper: Left Intact; Right Intact    Gross Motor Skills-Upper: Left Intact; Right Intact    Balance:  Sitting: Intact  Standing: Intact    Strength: BUE  Strength: Generally decreased, functional     Tone & Sensation: BUE  Tone: Normal  Sensation: Intact    Range of Motion: BUE  AROM: Within functional limits    Functional Mobility and Transfers for ADLs:  Bed Mobility:  Supine to Sit: Supervision  Sit to Supine: Supervision  Scooting: Supervision    Transfers:  Sit to Stand: Stand-by assistance  Stand to Sit: Stand-by assistance      ADL Assessment:  Feeding: Independent    Oral Facial Hygiene/Grooming: Modified Independent    Bathing: Minimum assistance    Upper Body Dressing: Modified independent    Lower Body Dressing: Minimum assistance    Toileting: Modified independent      ADL Intervention:  Feeding  Feeding Assistance: Independent  Food to Mouth: Independent    Upper Body Dressing Assistance  Dressing Assistance: Modified independent  Shirt simulation with hospital gown: Modified independent    Cognitive Retraining  Safety/Judgement: Fall prevention; Awareness of environment    Pain:  Pain level pre-treatment: 0/10   Pain level post-treatment: 0/10   Pain Intervention(s): Medication (see MAR); Rest, Ice, Repositioning   Response to intervention: Nurse notified, See doc flow    Activity Tolerance:   Fair    Please refer to the flowsheet for vital signs taken during this treatment. After treatment:   []  Patient left in no apparent distress sitting up in chair  [x]  Patient left in no apparent distress in bed  [x]  Call bell left within reach  [x]  Nursing notified  []  Caregiver present  []  Bed alarm activated    COMMUNICATION/EDUCATION:   [x]      Role of Occupational Therapy in the acute care setting  [x]      Home safety education was provided and the patient/caregiver indicated understanding. [x]      Patient/family have participated as able and agree with findings and recommendations. []      Patient is unable to participate in plan of care at this time. Thank you for this referral.  Miguel Briggs, OTR/L  Time Calculation: 15 mins      Eval Complexity: History: LOW Complexity : Brief history review ; Examination: LOW Complexity : 1-3 performance deficits relating to physical, cognitive , or psychosocial skils that result in activity limitations and / or participation restrictions ;    Decision Making:LOW Complexity : No comorbidities that affect functional and no verbal or physical assistance needed to complete eval tasks     aLuryn Escalona -PAC® Daily Activity Inpatient Short Form (6-Clicks)*    How much HELP from another person does the patient currently need    (If the patient hasn't done an activity recently, how much help from another person do you think he/she would need if he/she tried?)   Total (Total A or Dep)   A Lot  (Mod to Max A)   A Little (Sup or Min A)   None (Mod I to I)   Putting on and taking off regular lower body clothing? [] 1 [] 2 [x] 3 [] 4   2. Bathing (including washing, rinsing,      drying)? [] 1 [] 2 [x] 3 [] 4   3. Toileting, which includes using toilet, bedpan or urinal?   [] 1 [] 2 [] 3 [x] 4   4. Putting on and taking off regular upper body clothing? [] 1 [] 2 [] 3 [x] 4   5. Taking care of personal grooming such as brushing teeth? [] 1 [] 2 [] 3 [x] 4   6. Eating meals?    [] 1 [] 2 [] 3 [x] 4

## 2023-01-19 NOTE — PROGRESS NOTES
[facility] Hospitalist Group  Progress Note    Patient: Louise Marr Age: 60 y.o. : 1960 MR#: [id number] SSN: [id number]  Date/Time: 2023     C/C: Shortness of Breath       Subjective:   HPI :   60year old female with COPD/Asthma overlap on benralizumab infusions and 2-3L of home O2 who comes to the [facility] department with wheezing and exertional dyspnea typical of previous exacerbations. Found to be parainfluenza positive- has sick contact of grandchild    She continues to have shortness of breath when I evaluated her. She denied and chest pain. She says this is very similar to past exacerbations. Otherwise she is not experiencing fevers/chills. Not having much sinus congestion. Review of Systems:  positive responses in bold type   Constitutional: Malaise Negative for fever, chills, diaphoresis and unexpected weight change. HENT: Negative for ear pain, congestion, sore throat, rhinorrhea, drooling, trouble swallowing, neck pain and tinnitus. Eyes: Negative for photophobia, pain, redness and visual disturbance. Respiratory: shortness of breath, cough negative for choking, chest tightness, wheezing or stridor. Cardiovascular: Negative for chest pain, palpitations and leg swelling. Gastrointestinal: Negative for nausea, vomiting, abdominal pain, diarrhea, constipation, blood in stool, abdominal distention and anal bleeding. Genitourinary: Negative for dysuria, urgency, frequency, hematuria, flank pain and difficulty urinating. Musculoskeletal: Negative for back pain and arthralgias. Skin: Negative for color change, rash and wound. Neurological: Negative for dizziness, seizures, syncope, speech difficulty, light-headedness or headaches. Hematological: Does not bruise/bleed easily.    Psychiatric/Behavioral: Negative for suicidal ideas, hallucinations, behavioral problems, self-injury or agitation     Assessment/Plan:     COPD/Asthma overlap exacerbation 2/2 parainfluenza infection. + sick contacts.     No e/o pneumonia on cxr, ordering procal- if negative will continue chronic azithro M,W,Fr per home regimen  Acute on Chronic Hypoxic Respiratory Failure  Severe Persistent Asthman with steroid dependence and on benralizumab injections for eosinophilia  Morbid Obesity  Hypertension- on lisinopril and bid lasix  T2DM- on 65 units nightly, 10units in am; and ssi 6-12 depending on home bg readings  Hx HFpEF, no e/o hypervolemia on this admission and reliably <100 pro-BNP    Plan:  Continue home inhalers  Duonebs q4 hours prn  Daily 40 mg prednisone x 5 days total - last day   Continue home htn medications  Dose reduce lantus ~25% given change diet in hospital lantus 50u- reassess need; w/ ssi  Titrate O2 for pO2 88-92%  Supportive care for viral infection      Objective:       General:  Alert, cooperative, no acute distress   HEENT: No facial asymmetry, anicteric sclera  Cardiovascular: S1S2 - regular rate/rhythm  Pulmonary: conversational dyspnea, diffuse wheezing   GI:  +BS in all four quadrants, soft, non-tender  Extremities:  No edema; 2+ dorsalis pedis pulses bilaterally  Neuro: Alert and oriented X 4      DVT Prophylaxis:  [x]Lovenox  []Hep SQ  []SCDs  []Coumadin   []On Heparin gtt    [] Eliquis [] Xarelto     Vitals:         VS: Visit Vitals  BP (!) 147/95   Pulse 93   Temp 97.7 °F (36.5 °C)   Resp 24   Ht 5' 3\" (1.6 m)   Wt 117.9 kg (260 lb)   SpO2 96%   BMI 46.06 kg/m²      Tmax/24hrs: Temp (24hrs), Av.9 °F (36.6 °C), Min:97.7 °F (36.5 °C), Max:98 °F (36.7 °C)        Medications:   Current Facility-Administered Medications   Medication Dose Route Frequency    aspirin delayed-release tablet 81 mg  81 mg Oral DAILY    azithromycin (ZITHROMAX) tablet 250 mg  250 mg Oral DAILY    lisinopriL (PRINIVIL, ZESTRIL) tablet 10 mg  10 mg Oral DAILY    montelukast (SINGULAIR) tablet 10 mg  10 mg Oral QHS    pantoprazole (PROTONIX) tablet 40 mg 40 mg Oral DAILY    simethicone (MYLICON) tablet 515 mg  120 mg Oral QID PRN    insulin lispro (HUMALOG) injection   SubCUTAneous AC&HS    glucose chewable tablet 16 g  16 g Oral PRN    glucagon (GLUCAGEN) injection 1 mg  1 mg IntraMUSCular PRN    dextrose 10% infusion 0-250 mL  0-250 mL IntraVENous PRN    sodium chloride (NS) flush 5-40 mL  5-40 mL IntraVENous Q8H    sodium chloride (NS) flush 5-40 mL  5-40 mL IntraVENous PRN    acetaminophen (TYLENOL) tablet 650 mg  650 mg Oral Q6H PRN    Or    acetaminophen (TYLENOL) suppository 650 mg  650 mg Rectal Q6H PRN    ondansetron (ZOFRAN ODT) tablet 4 mg  4 mg Oral Q8H PRN    Or    ondansetron (ZOFRAN) injection 4 mg  4 mg IntraVENous Q6H PRN    albuterol/ipratropium (DUONEB) neb solution  1 Dose Nebulization Q4H PRN    enoxaparin (LOVENOX) injection 30 mg  30 mg SubCUTAneous BID    polyethylene glycol (MIRALAX) packet 17 g  17 g Oral DAILY PRN    perflutren lipid microspheres (DEFINITY) contrast injection 2 mL  2 mL IntraVENous CARD ONCE     Current Outpatient Medications   Medication Sig    predniSONE (DELTASONE) 50 mg tablet Take 1 Tablet by mouth daily for 5 days. lancets (Accu-Chek Softclix Lancets) misc Please use one lancet every time you check your blood sugar    famotidine (PEPCID) 20 mg tablet Take 20 mg by mouth as needed for Heartburn or Gastroesophageal Reflux Disease (GERD). As needed for breakthrough heartburn    albuterol-ipratropium (DUO-NEB) 2.5 mg-0.5 mg/3 ml nebu 3 mL by Nebulization route every four (4) hours as needed for Wheezing.    montelukast (SINGULAIR) 10 mg tablet Take 1 tablet by mouth nightly    fluticasone propion-salmeteroL (Advair HFA) 230-21 mcg/actuation inhaler Take 2 Puffs by inhalation two (2) times a day. Rinse and gargle after each use. Disp: 3 month    budesonide (PULMICORT) 1 mg/2 mL nbsp Take 2 mL by inhalation daily.     cholecalciferol (VITAMIN D3) (2,000 UNITS /50 MCG) cap capsule Take 5,000 Units by mouth every seven (7) days. potassium chloride (K-DUR, KLOR-CON M20) 20 mEq tablet Take 20 mEq by mouth daily. azelastine (ASTELIN) 137 mcg (0.1 %) nasal spray 2 Sprays by Both Nostrils route as needed. EPINEPHrine (EPIPEN) 0.3 mg/0.3 mL injection 0.3 mg by IntraMUSCular route once as needed. Spiriva Respimat 2.5 mcg/actuation inhaler INHALE 2 SPRAY(S) BY MOUTH ONCE DAILY (Patient taking differently: Take 2 Puffs by inhalation daily.)    azithromycin (ZITHROMAX) 250 mg tablet TAKE 1 TABLET BY MOUTH ON MONDAY, WEDNESDAY AND FRIDAY (Patient taking differently: Take 250 mg by mouth DIALYSIS MON, WED & FRI.)    insulin lispro (HUMALOG) 100 unit/mL kwikpen 6-12 Units. polyethylene glycol (MIRALAX) 17 gram/dose powder Take 17 g by mouth as needed. furosemide (LASIX) 40 mg tablet Take 1 tablet by mouth twice daily (Patient taking differently: Take 40 mg by mouth two (2) times a day.)    fluticasone propionate (FLONASE) 50 mcg/actuation nasal spray 2 Sprays by Both Nostrils route daily. simethicone (GAS-X) 125 mg capsule Take 125 mg by mouth four (4) times daily as needed for Flatulence. Blood-Glucose Meter monitoring kit CHECK BLOOD SUGARS B.I.D. E11.9    Insulin Needles, Disposable, 31 gauge x 3/16\" ndle Use with Basaglar once daily. Insulin Syringe-Needle U-100 0.3 mL 31 gauge x 5/16\" syrg USE AS DIRECTED    acetaminophen (TYLENOL) 500 mg tablet Take 500 mg by mouth every six (6) hours as needed for Fever or Pain. albuterol (PROVENTIL HFA, VENTOLIN HFA, PROAIR HFA) 90 mcg/actuation inhaler Take 2 Puffs by inhalation every four (4) hours as needed for Wheezing. omeprazole (PRILOSEC) 40 mg capsule Take 40 mg by mouth daily. lisinopril (PRINIVIL, ZESTRIL) 20 mg tablet Take 10 mg by mouth daily. OXYGEN-AIR DELIVERY SYSTEMS 2 L by Nasal route continuous. First Choice between 2L and 3L    aspirin delayed-release 81 mg tablet Take 81 mg by mouth daily.        Labs:    Recent Labs     01/18/23  1446   WBC 4.5*   HGB 14.3   HCT 42.0        Recent Labs     01/18/23  1446      K 4.1      CO2 31   *   BUN 12   CREA 0.98   CA 9.9   ALB 3.9   ALT 45         Time spent on direct patient care >30 mints     Complexity : High complex - due to multiple medical issues outlined above. CODE Status : FULL    Case discussed with:  [x]Patient  [] Family  []Nursing  []Case Management       Disclaimer: Sections of this note are dictated utilizing voice recognition software, which may have resulted in some phonetic based errors in grammar and contents. Even though attempts were made to correct all the mistakes, some may have been missed, and remained in the body of the document. If questions arise, please contact our department.     Signed By: Gibran Nguyen DO     January 19, 2023

## 2023-01-19 NOTE — H&P
GENERAL GENERIC H&P/CONSULT    CC: Shortness of breath    Subjective: This is a 60yo  female presenting for shortness of breath. She has past med hx of COPD chronically on 2L Home oxygen, Asthma CHF. Patient reporting increased O2 demand of 3L on exertion and has been worsening over past 3 days. Herillness has been progressing initially with sore throat now having difficulty moving air and coughing up productive green phlegm. She describes associated pleuritic chest pain on inspiration. Past Medical History:   Diagnosis Date    Asthma     Chronic lung disease     COPD     Cystocele, midline     Diabetes mellitus (HCC)     GERD (gastroesophageal reflux disease)     Hidradenitis suppurativa     Hyperlipidemia     Hypertension     SHERRIE on CPAP     CPAP    Stress incontinence       Past Surgical History:   Procedure Laterality Date    HX APPENDECTOMY      HX CATARACT REMOVAL Right     HX CHOLECYSTECTOMY      HX DILATION AND CURETTAGE      Dysfunctional uterine bleeding, thought 2/2 fibroids    HX HEART CATHETERIZATION  12/2020    HX TUBAL LIGATION      AK UNLISTED PROCEDURE BREAST      Right breast benign tumor removal      Prior to Admission medications    Medication Sig Start Date End Date Taking? Authorizing Provider   predniSONE (DELTASONE) 50 mg tablet Take 1 Tablet by mouth daily for 5 days. 1/18/23 1/23/23 Yes Fuentes Alonso MD   lancets (Accu-Chek Softclix Lancets) misc Please use one lancet every time you check your blood sugar 12/13/22   AntonioLexi cornejo MD   famotidine (PEPCID) 20 mg tablet Take 20 mg by mouth as needed for Heartburn or Gastroesophageal Reflux Disease (GERD). As needed for breakthrough heartburn    Provider, Historical   albuterol-ipratropium (DUO-NEB) 2.5 mg-0.5 mg/3 ml nebu 3 mL by Nebulization route every four (4) hours as needed for Wheezing.     Provider, Historical   montelukast (SINGULAIR) 10 mg tablet Take 1 tablet by mouth nightly 11/30/22   Carlos Manuel Higgins MD fluticasone propion-salmeteroL (Advair HFA) 230-21 mcg/actuation inhaler Take 2 Puffs by inhalation two (2) times a day. Rinse and gargle after each use. Disp: 3 month 9/23/22   Simón Natarajan MD   budesonide (PULMICORT) 1 mg/2 mL nbsp Take 2 mL by inhalation daily. 9/23/22   Simón Natarajan MD   cholecalciferol (VITAMIN D3) (2,000 UNITS /50 MCG) cap capsule Take 5,000 Units by mouth every seven (7) days. Provider, Historical   potassium chloride (K-DUR, KLOR-CON M20) 20 mEq tablet Take 20 mEq by mouth daily. Provider, Historical   azelastine (ASTELIN) 137 mcg (0.1 %) nasal spray 2 Sprays by Both Nostrils route as needed. 7/26/22   Provider, Historical   EPINEPHrine (EPIPEN) 0.3 mg/0.3 mL injection 0.3 mg by IntraMUSCular route once as needed. 7/26/22   Provider, Historical   Spiriva Respimat 2.5 mcg/actuation inhaler INHALE 2 SPRAY(S) BY MOUTH ONCE DAILY  Patient taking differently: Take 2 Puffs by inhalation daily. 9/7/22   Simón Natarajan MD   azithromycin (ZITHROMAX) 250 mg tablet TAKE 1 TABLET BY MOUTH ON MONDAY, [facility] AND FRIDAY  Patient taking differently: Take 250 mg by mouth DIALYSIS MON, WED & FRI. 6/28/22   Simón Natarajan MD   insulin lispro (HUMALOG) 100 unit/mL kwikpen 6-12 Units. 7/28/21   Provider, Historical   polyethylene glycol (MIRALAX) 17 gram/dose powder Take 17 g by mouth as needed. Provider, Historical   furosemide (LASIX) 40 mg tablet Take 1 tablet by mouth twice daily  Patient taking differently: Take 40 mg by mouth two (2) times a day. 10/1/21   Catherine Donnelly MD   fluticasone propionate (FLONASE) 50 mcg/actuation nasal spray 2 Sprays by Both Nostrils route daily. 8/31/21   Simón Natarajan MD   simethicone (GAS-X) 125 mg capsule Take 125 mg by mouth four (4) times daily as needed for Flatulence. Provider, Historical   Blood-Glucose Meter monitoring kit CHECK BLOOD SUGARS B.I.D.    E11.9 6/16/17   Provider, Historical   Insulin Needles, Disposable, 31 gauge x 3/16\" ndle Use with Basaglar once daily. 18   Provider, Historical   Insulin Syringe-Needle U-100 0.3 mL 31 gauge x \" syrg USE AS DIRECTED 17   Provider, Historical   acetaminophen (TYLENOL) 500 mg tablet Take 500 mg by mouth every six (6) hours as needed for Fever or Pain. Provider, Historical   albuterol (PROVENTIL HFA, VENTOLIN HFA, PROAIR HFA) 90 mcg/actuation inhaler Take 2 Puffs by inhalation every four (4) hours as needed for Wheezing. 20   Evonne Kate PA-C   omeprazole (PRILOSEC) 40 mg capsule Take 40 mg by mouth daily. 3/9/18   Provider, Historical   lisinopril (PRINIVIL, ZESTRIL) 20 mg tablet Take 10 mg by mouth daily. Provider, Historical   OXYGEN-AIR DELIVERY SYSTEMS 2 L by Nasal route continuous. First Choice between 2L and 3L    Provider, Historical   aspirin delayed-release 81 mg tablet Take 81 mg by mouth daily. Provider, Historical     Allergies   Allergen Reactions    Ancef [Cefazolin] Hives     Has prev tolerated ceftriaxone as well as pip/tazo and amox    Contrast Agent [Iodine] Anaphylaxis, Shortness of Breath and Swelling     Needs pre-medication for IV contrast with Benadryl, Solu-Medrol    Fish Containing Products Anaphylaxis     Pt states she had a severe allergic reaction at 7 y/o. Statins-Hmg-Coa Reductase Inhibitors Myalgia    Metformin Other (comments)     Abdominal pain, diarrhea.     Codeine Other (comments)     Altered mental status      Social History     Tobacco Use    Smoking status: Former     Packs/day: 1.00     Years: 30.00     Pack years: 30.00     Types: Cigarettes     Start date: 1966     Quit date: 2006     Years since quittin.3    Smokeless tobacco: Former    Tobacco comments:     1-1.5 packs per day   Substance Use Topics    Alcohol use: No      Family History   Problem Relation Age of Onset    Hypertension Mother     Stroke Mother     Breast Cancer Mother         Bilateral mastectomies    Cancer Mother         ovarian and breast Heart Failure Mother     Ovarian Cancer Mother     Heart Attack Father         2011    Heart Surgery Father         CABG    Heart Failure Father     COPD Sister         Heavy smoker    Cancer Sister         ovarian    Heart Failure Sister     Lung Disease Sister     Asthma Child     Cancer Maternal Aunt         pancreatic    Cancer Maternal Grandfather         stomach      Review of Systems   Constitutional:  Positive for activity change and fatigue. HENT:  Positive for congestion. Eyes:  Negative for discharge. Respiratory:  Positive for cough, shortness of breath and wheezing. Cardiovascular:  Positive for chest pain. Gastrointestinal:  Negative for abdominal distention. Endocrine: Negative for cold intolerance. Genitourinary:  Negative for difficulty urinating. Musculoskeletal:  Positive for arthralgias. Skin:  Negative for color change. Allergic/Immunologic: Negative for environmental allergies. Neurological:  Negative for dizziness. Hematological:  Negative for adenopathy. Psychiatric/Behavioral:  Negative for agitation. Objective:    No intake/output data recorded. No intake/output data recorded.   Patient Vitals for the past 8 hrs:   BP Temp Pulse Resp SpO2 Height Weight   01/18/23 1800 (!) 145/77 -- 89 24 97 % -- --   01/18/23 1757 -- -- 92 25 97 % -- --   01/18/23 1747 (!) 149/82 -- 92 28 97 % -- --   01/18/23 1737 -- -- 90 26 97 % -- --   01/18/23 1727 (!) 140/76 -- 85 22 98 % -- --   01/18/23 1717 (!) 153/72 -- 84 21 99 % -- --   01/18/23 1707 -- -- 83 12 100 % -- --   01/18/23 1657 (!) 148/70 -- 84 17 95 % -- --   01/18/23 1647 (!) 152/79 -- 85 25 97 % -- --   01/18/23 1637 -- -- 85 24 98 % -- --   01/18/23 1627 (!) 171/70 -- 87 20 99 % -- --   01/18/23 1617 (!) 168/75 -- 88 27 98 % -- --   01/18/23 1607 -- -- 86 16 100 % -- --   01/18/23 1557 (!) 161/70 -- 86 21 98 % -- --   01/18/23 1547 (!) 167/72 -- 83 28 97 % -- --   01/18/23 1537 -- -- 83 21 98 % -- --   01/18/23 1530 (!) 200/71 -- 79 26 100 % -- --   01/18/23 1527 -- -- 77 23 100 % -- --   01/18/23 1517 (!) 185/90 -- 80 21 100 % -- --   01/18/23 1457 (!) 164/76 -- -- -- -- -- --   01/18/23 1455 (!) 180/89 97.7 °F (36.5 °C) 80 22 98 % 5' 3\" (1.6 m) 117.9 kg (260 lb)   01/18/23 1417 (!) 167/88 98 °F (36.7 °C) 78 22 97 % -- --     Physical Exam     Labs:    Recent Results (from the past 24 hour(s))   EKG, 12 LEAD, INITIAL    Collection Time: 01/18/23  2:23 PM   Result Value Ref Range    Ventricular Rate 80 BPM    Atrial Rate 80 BPM    P-R Interval 138 ms    QRS Duration 132 ms    Q-T Interval 408 ms    QTC Calculation (Bezet) 470 ms    Calculated P Axis 50 degrees    Calculated R Axis 79 degrees    Calculated T Axis 6 degrees    Diagnosis       Normal sinus rhythm  Right bundle branch block  Abnormal ECG  When compared with ECG of 10-DEC-2022 15:00,  No significant change was found  Confirmed by Handy Redding ([id number]) on 1/18/2023 3:49:44 PM     CBC WITH AUTOMATED DIFF    Collection Time: 01/18/23  2:46 PM   Result Value Ref Range    WBC 4.5 (L) 4.6 - 13.2 K/uL    RBC 5.00 4.20 - 5.30 M/uL    HGB 14.3 12.0 - 16.0 g/dL    HCT 42.0 35.0 - 45.0 %    MCV 84.0 78.0 - 100.0 FL    MCH 28.6 24.0 - 34.0 PG    MCHC 34.0 31.0 - 37.0 g/dL    RDW 14.2 11.6 - 14.5 %    PLATELET 266 576 - 300 K/uL    MPV 8.6 (L) 9.2 - 11.8 FL    NRBC 0.0 0  WBC    ABSOLUTE NRBC 0.00 0.00 - 0.01 K/uL    NEUTROPHILS 44 40 - 73 %    LYMPHOCYTES 45 21 - 52 %    MONOCYTES 8 3 - 10 %    EOSINOPHILS 1 0 - 5 %    BASOPHILS 0 0 - 2 %    IMMATURE GRANULOCYTES 1 (H) 0.0 - 0.5 %    ABS. NEUTROPHILS 2.0 1.8 - 8.0 K/UL    ABS. LYMPHOCYTES 2.0 0.9 - 3.6 K/UL    ABS. MONOCYTES 0.4 0.05 - 1.2 K/UL    ABS. EOSINOPHILS 0.1 0.0 - 0.4 K/UL    ABS. BASOPHILS 0.0 0.0 - 0.1 K/UL    ABS. IMM.  GRANS. 0.0 0.00 - 0.04 K/UL    DF AUTOMATED     METABOLIC PANEL, COMPREHENSIVE    Collection Time: 01/18/23  2:46 PM   Result Value Ref Range    Sodium 137 136 - 145 mmol/L    Potassium 4.1 3.5 - 5.5 mmol/L    Chloride 101 100 - 111 mmol/L    CO2 31 21 - 32 mmol/L    Anion gap 5 3.0 - 18 mmol/L    Glucose 154 (H) 74 - 99 mg/dL    BUN 12 7.0 - 18 MG/DL    Creatinine 0.98 0.6 - 1.3 MG/DL    BUN/Creatinine ratio 12 12 - 20      eGFR >60 >60 ml/min/1.73m2    Calcium 9.9 8.5 - 10.1 MG/DL    Bilirubin, total 0.7 0.2 - 1.0 MG/DL    ALT (SGPT) 45 13 - 56 U/L    AST (SGOT) 21 10 - 38 U/L    Alk.  phosphatase 88 45 - 117 U/L    Protein, total 7.9 6.4 - 8.2 g/dL    Albumin 3.9 3.4 - 5.0 g/dL    Globulin 4.0 2.0 - 4.0 g/dL    A-G Ratio 1.0 0.8 - 1.7     TROPONIN-HIGH SENSITIVITY    Collection Time: 01/18/23  2:46 PM   Result Value Ref Range    Troponin-High Sensitivity 9 0 - 54 ng/L   NT-PRO BNP    Collection Time: 01/18/23  2:46 PM   Result Value Ref Range    NT pro-BNP 27 0 - 900 PG/ML   RESPIRATORY VIRUS PANEL W/COVID-19, PCR    Collection Time: 01/18/23  3:07 PM    Specimen: Nasopharyngeal   Result Value Ref Range    Adenovirus Not detected NOTD      Coronavirus 229E Not detected NOTD      Coronavirus HKU1 PENDING NOTD    Coronavirus CVNL63 Not detected NOTD      Coronavirus OC43 Not detected NOTD      SARS-CoV-2, PCR Not detected NOTD      Metapneumovirus Not detected NOTD      Rhinovirus and Enterovirus Not detected NOTD      Influenza A Not detected NOTD      Influenza A, subtype H1 Not detected NOTD      Influenza A, subtype H3 Not detected NOTD      INFLUENZA A H1N1 PCR Not detected NOTD      Influenza B Not detected NOTD      Parainfluenza 1 Detected (A) NOTD      Parainfluenza 2 Not detected NOTD      Parainfluenza 3 Not detected NOTD      Parainfluenza virus 4 Not detected NOTD      RSV by PCR Not detected NOTD      B. parapertussis, PCR Not detected NOTD      Bordetella pertussis - PCR Not detected NOTD      Chlamydophila pneumoniae DNA, QL, PCR Not detected NOTD      Mycoplasma pneumoniae DNA, QL, PCR Not detected NOTD     BLOOD GAS, ARTERIAL POC    Collection Time: 01/18/23  7:33 PM   Result Value Ref Range    Device: NASAL CANNULA      pH (POC) 7.38 7.35 - 7.45      pCO2 (POC) 39.2 35.0 - 45.0 MMHG    pO2 (POC) 105 (H) 80 - 100 MMHG    HCO3 (POC) 23.3 22 - 26 MMOL/L    sO2 (POC) 98.0 (H) 92 - 97 %    Base deficit (POC) 1.6 mmol/L    Allens test (POC) Positive      Total resp. rate 24      Site RIGHT RADIAL      Specimen type (POC) ARTERIAL      Performed by Leopold Manes        ECG:   Normal sinus rhythm   Right bundle branch block   Abnormal ECG     Chest X-Ray:  IMPRESSION  Bibasilar atelectasis, without evidence of acute cardiopulmonary process    Assessment:  Principal Problem:    Acute exacerbation of chronic obstructive pulmonary disease (COPD) ([facility] (10/7/2018)    Active Problems:    SHERRIE on CPAP ()      Obesity, Class III, BMI 40-49.9 (morbid obesity) ([facility] (8/9/2016)      Dyspnea (8/9/2016)      Acute exacerbation of COPD with asthma ([facility] (2/8/2017)      Hypoxia (7/9/2020)      Pleurisy (10/28/2020)      Plan:  Hi Dose Steroids and magnesium  Duonebs q4hrs PRN per respiratory  Serial ABG vs CXR if Shortness of breath does not improve  Incentive spirometry  O2 by face mask/NC as needed  Statin for Anti inflammatory effect   1x azithromycin  Nightly statin  Sliding scale insulin  Will check an Echo    GLOBAL:  Admit to: medical floor with telemetry  Cardiac Diet  DVT PPX:lovenox 40mg qd  Full Code  PT/OT  Tylenol/NSAID for pain;  Bowel regimen for gastroparesis  Optional Inpatient Sleep Regimen  Anticipate DC 3-4 days      Signed:  Mc Garcia MD 1/18/2023

## 2023-01-19 NOTE — ACP (ADVANCE CARE PLANNING)
Advance Care Planning   Advance Care Planning Inpatient Note  [address]    Today's Date: 1/19/2023  Unit: [facility] EMERGENCY DEPT    Received request from . Upon review of chart and communication with care team, patient's decision making abilities are not in question. Patient was/were present in the room during visit. Goals of ACP Conversation:  Discuss Advance Care planning documents    Health Care Decision Makers:      Primary Decision Maker: Linda Curranly - Daughter - [phone]    Secondary Decision Maker: Chevy Portillo - Sister - [phone]    Summary:  No Decision Maker named by patient at this time    Advance Care Planning Documents (Patient Wishes) on file:  None     Assessment:    Patient seen as a new admit to the hospital from bed 5 of the emergency room. Asked about her having an advance directive and she said yes but there was no copy here. Patient will have family provide us a copy when she can.     Interventions:  Deferred conversation as patient not interested in completing an advance directive at this time    Care Preferences Communicated:  No    Outcomes/Plan:      [facility] 1/19/2023 at 10:31 AM

## 2023-01-19 NOTE — PROGRESS NOTES
completed the initial Spiritual Assessment of the patient, and offered Pastoral Care support to the patient. Patient has an advance directive at home but not here. Patient will have family provide us a copy when she can. Patient does not have any Alevism/cultural needs that will affect patients preferences in health care. Chaplains will continue to follow and will provide pastoral care on an as needed/requested basis.     Eda Khalil  [facility] Care Department  [phone]

## 2023-01-19 NOTE — PROGRESS NOTES
Spiriva Respimat 2.5 mcg therapeutically interchanged for Ipratropium nebulizer vials per the [employer] approved therapeutic interchange policy.

## 2023-01-19 NOTE — ED NOTES
Pt concerned about BGL since she missed morning dose of lantus.  Provider contacted and order received

## 2023-01-19 NOTE — PROGRESS NOTES
Pharmacist Review and Automatic Dose Adjustment of Prophylactic Enoxaparin    *Review reason for admission/hospital problem list*    The reviewing pharmacist has made an adjustment to the ordered enoxaparin dose or converted to UFH per the approved [facility] protocol and table as identified below. Tate Tidwell is a 60 y.o. female. No lab exists for component: CREATININE    Estimated Creatinine Clearance: 72.9 mL/min (based on SCr of 0.98 mg/dL).     Height:   Ht Readings from Last 1 Encounters:   01/18/23 160 cm (63\")     Weight:  Wt Readings from Last 1 Encounters:   01/18/23 117.9 kg (260 lb)                     Estimated CrCl  (ml/min) Patient Weight (kg)     50.9and below .9 101-150.9 151-174.9 175 or greater    30 or greater 30 mg SUBQ daily 40 mg SUBQ daily (or 30mg BID for orthopedic cases)     30 mg SUBQ BID 40 mg SUBQ BID 60mg SUBQ BID    15-29.9 UFH 5000 units SUBQ BID 30 mg SUBQ daily 30 mg SUBQ daily 40 mg SUBQ daily 60 mg SUBQ daily    Less than 15or dialysis UFH 5000 units SUBQ BID UFH 5000 units SUBQ TID UFH 7500 units SUBQ TID          Plan: Based upon the patient's weight and renal function, the ordered enoxaparin dose of 40mg daily has been changed/converted to 30 mg BID      Thank you,  Turner Pastrana [place]

## 2023-01-20 LAB
ALBUMIN SERPL-MCNC: 3.8 G/DL (ref 3.4–5)
ALBUMIN/GLOB SERPL: 1.1 (ref 0.8–1.7)
ALP SERPL-CCNC: 76 U/L (ref 45–117)
ALT SERPL-CCNC: 31 U/L (ref 13–56)
ANION GAP SERPL CALC-SCNC: 10 MMOL/L (ref 3–18)
AST SERPL-CCNC: 6 U/L (ref 10–38)
BILIRUB SERPL-MCNC: 0.5 MG/DL (ref 0.2–1)
BUN SERPL-MCNC: 28 MG/DL (ref 7–18)
BUN/CREAT SERPL: 22 (ref 12–20)
CALCIUM SERPL-MCNC: 9.9 MG/DL (ref 8.5–10.1)
CHLORIDE SERPL-SCNC: 97 MMOL/L (ref 100–111)
CO2 SERPL-SCNC: 25 MMOL/L (ref 21–32)
CREAT SERPL-MCNC: 1.29 MG/DL (ref 0.6–1.3)
ERYTHROCYTE [DISTWIDTH] IN BLOOD BY AUTOMATED COUNT: 14.4 % (ref 11.6–14.5)
GLOBULIN SER CALC-MCNC: 3.6 G/DL (ref 2–4)
GLUCOSE BLD STRIP.AUTO-MCNC: 258 MG/DL (ref 70–110)
GLUCOSE BLD STRIP.AUTO-MCNC: 276 MG/DL (ref 70–110)
GLUCOSE BLD STRIP.AUTO-MCNC: 284 MG/DL (ref 70–110)
GLUCOSE BLD STRIP.AUTO-MCNC: 390 MG/DL (ref 70–110)
GLUCOSE SERPL-MCNC: 388 MG/DL (ref 74–99)
HCT VFR BLD AUTO: 38.8 % (ref 35–45)
HGB BLD-MCNC: 13.3 G/DL (ref 12–16)
MCH RBC QN AUTO: 29.3 PG (ref 24–34)
MCHC RBC AUTO-ENTMCNC: 34.3 G/DL (ref 31–37)
MCV RBC AUTO: 85.5 FL (ref 78–100)
NRBC # BLD: 0 K/UL (ref 0–0.01)
NRBC BLD-RTO: 0 PER 100 WBC
PLATELET # BLD AUTO: 316 K/UL (ref 135–420)
PMV BLD AUTO: 9.1 FL (ref 9.2–11.8)
POTASSIUM SERPL-SCNC: 4.6 MMOL/L (ref 3.5–5.5)
PROT SERPL-MCNC: 7.4 G/DL (ref 6.4–8.2)
RBC # BLD AUTO: 4.54 M/UL (ref 4.2–5.3)
SODIUM SERPL-SCNC: 132 MMOL/L (ref 136–145)
WBC # BLD AUTO: 14 K/UL (ref 4.6–13.2)

## 2023-01-20 PROCEDURE — 65270000046 HC RM TELEMETRY

## 2023-01-20 PROCEDURE — 94640 AIRWAY INHALATION TREATMENT: CPT

## 2023-01-20 PROCEDURE — 80053 COMPREHEN METABOLIC PANEL: CPT

## 2023-01-20 PROCEDURE — 74011250637 HC RX REV CODE- 250/637: Performed by: INTERNAL MEDICINE

## 2023-01-20 PROCEDURE — 74011250636 HC RX REV CODE- 250/636: Performed by: INTERNAL MEDICINE

## 2023-01-20 PROCEDURE — 74011250636 HC RX REV CODE- 250/636: Performed by: HOSPITALIST

## 2023-01-20 PROCEDURE — 74011250636 HC RX REV CODE- 250/636: Performed by: STUDENT IN AN ORGANIZED HEALTH CARE EDUCATION/TRAINING PROGRAM

## 2023-01-20 PROCEDURE — 74011000250 HC RX REV CODE- 250: Performed by: FAMILY MEDICINE

## 2023-01-20 PROCEDURE — 85027 COMPLETE CBC AUTOMATED: CPT

## 2023-01-20 PROCEDURE — 74011636637 HC RX REV CODE- 636/637: Performed by: STUDENT IN AN ORGANIZED HEALTH CARE EDUCATION/TRAINING PROGRAM

## 2023-01-20 PROCEDURE — 74011636637 HC RX REV CODE- 636/637: Performed by: HOSPITALIST

## 2023-01-20 PROCEDURE — 94761 N-INVAS EAR/PLS OXIMETRY MLT: CPT

## 2023-01-20 PROCEDURE — 74011636637 HC RX REV CODE- 636/637: Performed by: INTERNAL MEDICINE

## 2023-01-20 PROCEDURE — 36415 COLL VENOUS BLD VENIPUNCTURE: CPT

## 2023-01-20 PROCEDURE — 74011000250 HC RX REV CODE- 250: Performed by: INTERNAL MEDICINE

## 2023-01-20 PROCEDURE — 82962 GLUCOSE BLOOD TEST: CPT

## 2023-01-20 PROCEDURE — 77010033678 HC OXYGEN DAILY

## 2023-01-20 PROCEDURE — 74011250637 HC RX REV CODE- 250/637: Performed by: STUDENT IN AN ORGANIZED HEALTH CARE EDUCATION/TRAINING PROGRAM

## 2023-01-20 RX ORDER — BUDESONIDE 1 MG/2ML
1000 INHALANT ORAL
Status: DISCONTINUED | OUTPATIENT
Start: 2023-01-20 | End: 2023-01-22 | Stop reason: HOSPADM

## 2023-01-20 RX ORDER — INSULIN LISPRO 100 [IU]/ML
INJECTION, SOLUTION INTRAVENOUS; SUBCUTANEOUS
Status: DISCONTINUED | OUTPATIENT
Start: 2023-01-20 | End: 2023-01-22 | Stop reason: HOSPADM

## 2023-01-20 RX ORDER — ARFORMOTEROL TARTRATE 15 UG/2ML
15 SOLUTION RESPIRATORY (INHALATION)
Status: CANCELLED | OUTPATIENT
Start: 2023-01-20

## 2023-01-20 RX ORDER — BUDESONIDE 1 MG/2ML
1000 INHALANT ORAL
Status: CANCELLED | OUTPATIENT
Start: 2023-01-20

## 2023-01-20 RX ORDER — DEXTROSE MONOHYDRATE 100 MG/ML
0-250 INJECTION, SOLUTION INTRAVENOUS AS NEEDED
Status: DISCONTINUED | OUTPATIENT
Start: 2023-01-20 | End: 2023-01-22 | Stop reason: HOSPADM

## 2023-01-20 RX ORDER — IPRATROPIUM BROMIDE 0.5 MG/2.5ML
0.5 SOLUTION RESPIRATORY (INHALATION)
Status: DISCONTINUED | OUTPATIENT
Start: 2023-01-20 | End: 2023-01-22 | Stop reason: HOSPADM

## 2023-01-20 RX ORDER — ARFORMOTEROL TARTRATE 15 UG/2ML
15 SOLUTION RESPIRATORY (INHALATION)
Status: DISCONTINUED | OUTPATIENT
Start: 2023-01-20 | End: 2023-01-22 | Stop reason: HOSPADM

## 2023-01-20 RX ADMIN — ENOXAPARIN SODIUM 30 MG: 100 INJECTION SUBCUTANEOUS at 08:46

## 2023-01-20 RX ADMIN — Medication 9 UNITS: at 17:26

## 2023-01-20 RX ADMIN — IPRATROPIUM BROMIDE 0.5 MG: 0.5 SOLUTION RESPIRATORY (INHALATION) at 08:49

## 2023-01-20 RX ADMIN — BUDESONIDE 1000 MCG: 1 SUSPENSION RESPIRATORY (INHALATION) at 08:49

## 2023-01-20 RX ADMIN — SODIUM CHLORIDE, PRESERVATIVE FREE 10 ML: 5 INJECTION INTRAVENOUS at 06:17

## 2023-01-20 RX ADMIN — ARFORMOTEROL TARTRATE 15 MCG: 15 SOLUTION RESPIRATORY (INHALATION) at 08:49

## 2023-01-20 RX ADMIN — Medication 10 UNITS: at 11:30

## 2023-01-20 RX ADMIN — IPRATROPIUM BROMIDE 0.5 MG: 0.5 SOLUTION RESPIRATORY (INHALATION) at 20:57

## 2023-01-20 RX ADMIN — Medication 10 UNITS: at 08:49

## 2023-01-20 RX ADMIN — FUROSEMIDE 40 MG: 40 TABLET ORAL at 17:25

## 2023-01-20 RX ADMIN — MONTELUKAST 10 MG: 10 TABLET, FILM COATED ORAL at 23:56

## 2023-01-20 RX ADMIN — METHYLPREDNISOLONE SODIUM SUCCINATE 20 MG: 40 INJECTION, POWDER, FOR SOLUTION INTRAMUSCULAR; INTRAVENOUS at 17:25

## 2023-01-20 RX ADMIN — Medication 6 UNITS: at 08:30

## 2023-01-20 RX ADMIN — IPRATROPIUM BROMIDE 0.5 MG: 0.5 SOLUTION RESPIRATORY (INHALATION) at 14:54

## 2023-01-20 RX ADMIN — FUROSEMIDE 40 MG: 40 TABLET ORAL at 08:47

## 2023-01-20 RX ADMIN — METHYLPREDNISOLONE SODIUM SUCCINATE 40 MG: 40 INJECTION, POWDER, FOR SOLUTION INTRAMUSCULAR; INTRAVENOUS at 06:13

## 2023-01-20 RX ADMIN — Medication 8 UNITS: at 00:01

## 2023-01-20 RX ADMIN — BUDESONIDE 1000 MCG: 1 SUSPENSION RESPIRATORY (INHALATION) at 20:57

## 2023-01-20 RX ADMIN — SODIUM CHLORIDE, PRESERVATIVE FREE 10 ML: 5 INJECTION INTRAVENOUS at 00:02

## 2023-01-20 RX ADMIN — PANTOPRAZOLE SODIUM 40 MG: 40 TABLET, DELAYED RELEASE ORAL at 08:48

## 2023-01-20 RX ADMIN — Medication 9 UNITS: at 23:59

## 2023-01-20 RX ADMIN — LISINOPRIL 10 MG: 10 TABLET ORAL at 08:48

## 2023-01-20 RX ADMIN — ASPIRIN 81 MG: 81 TABLET, COATED ORAL at 08:48

## 2023-01-20 RX ADMIN — SODIUM CHLORIDE, PRESERVATIVE FREE 10 ML: 5 INJECTION INTRAVENOUS at 17:26

## 2023-01-20 RX ADMIN — AZITHROMYCIN MONOHYDRATE 250 MG: 250 TABLET ORAL at 23:56

## 2023-01-20 RX ADMIN — ENOXAPARIN SODIUM 30 MG: 100 INJECTION SUBCUTANEOUS at 17:25

## 2023-01-20 RX ADMIN — ARFORMOTEROL TARTRATE 15 MCG: 15 SOLUTION RESPIRATORY (INHALATION) at 20:57

## 2023-01-20 RX ADMIN — ACETAMINOPHEN 650 MG: 325 TABLET ORAL at 17:31

## 2023-01-20 RX ADMIN — Medication 50 UNITS: at 23:57

## 2023-01-20 NOTE — ED NOTES
Assumed care of pt post report from [facility]. Pt with available bed upstairs; will call report shortly to the floor.

## 2023-01-20 NOTE — ED NOTES
Report called to Floor at this time. TRANSFER - OUT REPORT:    Verbal report given to Ronak Garcia Click  being transferred to 201(unit) for routine progression of care       Report consisted of patients Situation, Background, Assessment and   Recommendations(SBAR). Information from the following report(s) SBAR, ED Summary, and MAR was reviewed with the receiving nurse. Lines:   Peripheral IV 01/18/23 Left Antecubital (Active)        Opportunity for questions and clarification was provided.

## 2023-01-20 NOTE — PROGRESS NOTES
Comprehensive Nutrition Assessment    Type and Reason for Visit: Initial, Positive nutrition screen    Nutrition Recommendations/Plan:   Continue current diet as tolerated. Pt likely to benefit from changing diet to controlled carbohydrate diet r/t h/o DM if hyperglycemia continues. Monitor PO intake, weight, labs, and POC while admitted. Malnutrition Assessment:  Malnutrition Status:  No malnutrition (01/20/23 1102)      Nutrition History and Allergies:   PMHx of COPD chronically on 2 L home oxygen, DM,  asthma, CHF. Pt presented for SOB- pt reported increased O2 demand of 3L on exertion, worsening over the past 3 days. Also reported productive cough. Food allergies include fish. Nutrition Assessment:    Received MST for pt- unsure if/amount of wt lost, though pt denied decreased appetite. Wt hx review shows pt without significant wt loss over the past > 1 year. No PO documentation present in chart. Pt unavailable to speak to x2. Unable to further evaluate nutrition/wt hx. Per MD note, pt endorses good appetite and was able to tolerate dinner well. Nutrition Related Findings:    Last BM not documented. Pertinent labs: Na (134) L, Ca (10.2) H. POC glucose 257-209 over the past 24-hrs. Pertinent meds: Lasix, Lantus, Humalog. Wound Type: None     Current Nutrition Intake & Therapies:  Average Meal Intake: Unable to assess  Average Supplement Intake: None ordered  ADULT DIET Regular; Low Fat/Low Chol/High Fiber/RACIEL    Anthropometric Measures:  Height: 5' 3\" (160 cm)  Ideal Body Weight (IBW): 115 lbs (52 kg)     Current Body Wt:  117.9 kg (260 lb) (1/19), 226.1 % IBW.  Not specified  Current BMI (kg/m2): 46.1  Usual Body Weight: 118.7 kg (261 lb 11.2 oz) (12/13/22)  % Weight Change (Calculated): -0.6  Weight Adjustment: No adjustment  BMI Category: Obese class 3 (BMI 40.0 or greater)    Estimated Daily Nutrient Needs:  Energy Requirements Based On: Formula (MSJ x 1-1.1)  Weight Used for Energy Requirements: Current  Energy (kcal/day): 3737 - 1874  Weight Used for Protein Requirements: Current (0.8-1)  Protein (g/day): 94 - 118  Method Used for Fluid Requirements: 1 ml/kcal  Fluid (ml/day): 5430 - 3464    Nutrition Diagnosis:   No nutrition diagnosis at this time     Nutrition Interventions:   Food and/or Nutrient Delivery: Continue current diet  Nutrition Education/Counseling: No recommendations at this time, Education not indicated  Coordination of Nutrition Care: Continue to monitor while inpatient       Goals:     Goals: Meet at least 75% of estimated needs, by next RD assessment       Nutrition Monitoring and Evaluation:      Food/Nutrient Intake Outcomes: Food and nutrient intake  Physical Signs/Symptoms Outcomes: Meal time behavior, Weight    Discharge Planning:    Continue current diet    Dontae Navas, [address]  Contact: [phone]

## 2023-01-20 NOTE — PROGRESS NOTES
[facility]   PM Check    Assessment & Plan:  -continue current treatment plan, refer to day team's progress note  -BG's remain elevated in the 300's despite current insulin regimen, pt asymptomatic; defer to day team to optimize BG control    Subjective:  Patient seen at bedside, sitting up in bed comfortably in no apparent distress. Saturating  95-96% on 3L NC. Patient states that she was feeling SOB when she got up out of bed and walked to the bathroom. Patient also endorses intermittent headaches but has not needed to ask for tylenol as the headaches are tolerable per patient. Patient endorses good appetite and was able tolerate dinner without issue. Patient denies chest pain, dizziness, chills, night sweats, or vision changes. Objective:  Visit Vitals  /74 (BP 1 Location: Right upper arm, BP Patient Position: At rest)   Pulse 86   Temp 97.7 °F (36.5 °C)   Resp 20   Ht 5' 3\" (1.6 m)   Wt 117.9 kg (260 lb)   SpO2 95%   Breastfeeding No   BMI 46.06 kg/m²       Physical Exam:   General: Well-appearing, NAD. HEENT: Conjunctiva pink, sclera anicteric. PERRL. EOMI   CV:  RRR, no M/G/R.  RESP: Unlabored breathing. No rales or rhonchi appreciated. Equal expansion bilaterally. Wheezing noted in bilateral lung fields along with congestion. ABD:  Soft, nontender, nondistended. No hepatosplenomegaly. MS:  No joint deformity or instability. No atrophy. Neuro:  5/5 strength bilateral upper extremities and lower extremities. A+Ox3. Ext:  No edema. 2+ radial and dp pulses bilaterally. Skin: Warm & dry. No rashes, lesions, or ulcers. Good turgor. Psych: Appropriate mood and affect. The above patient and plan were discussed with my supervising physician. See daily progress note for full assessment/plan.       Sayra Chaidez MD, PGY-1  [facility]  1/20/2023, 3:00 AM

## 2023-01-20 NOTE — ED NOTES
Attempted to call report to floor. Placed on hold for 10 minutes. Will attempt to call back to provide report at a more convenient time.

## 2023-01-20 NOTE — PROGRESS NOTES
[facility].  DAILY PROGRESS NOTE      Patient:    Louise Marr , 60 y.o. female   MRN:  [id number]  Room/Bed:  4[facility]  Admission Date:   1/18/2023  Code status:  Full Code    Reason for Admission: COPD exacerbation    ASSESSMENT AND PLAN:   Problem List Items Addressed This Visit          Respiratory    COPD exacerbation ([facility] 75.) - Primary    Relevant Medications    predniSONE (DELTASONE) 50 mg tablet    * (Principal) Acute exacerbation of chronic obstructive pulmonary disease (COPD) ([facility].)    Relevant Medications    predniSONE (DELTASONE) 50 mg tablet     Other Visit Diagnoses       Parainfluenza type 1 infection                  COPD Exacerbation 2/2 parainfluenza   -On 2L NC at home   -CXR (1/18): bibasilar atelectasis without evidence of acute cardiopulmonary process  -Procalcitonin: <0.05  -ABG (1/18): pH 7.38, pCO2 39.2, HCO3 23.3  Plan:  -Azithromycin 250 mg three times a week  -Pulmicort and arformoterol  -Atrovent  -Methylprednisolone   -Montelukast   -Duonebs PRN  -Titrate O2 for pO2 of 88-92%  -Perform med rec   -CPAP at night     -HFpEF  -NT pro-BNP: 39  -ECHO (1/19): EF 55-60%, normal left ventricular systolic function. Abnormal diastolic function. Plan:  -Lasix 40 mg BID       Hypertension  -Home med: lisinopril   Plan:  Lisinopril 10 mg daily     T2DM  -Home meds: 65 units nightly, 10 units in AM, and ssi 6-12 depending on home bg readings   -BG running high, likely due to steroids  Plan:  -Lantus 10 units daily, Lantus 50 units at bedtime, SSI  -adjust dosing as needed   -hypoglycemia protocol      Code: Full  Diet: Cardiac  DVT/AC: Lovenox 30 mg BID   Mobility: per protocol  Disposition: home    Point of Contact (relationship): Mago Pollard, daughter, [phone]      SUBJECTIVE:   Events of the last 24 hours:  No acute events overnight. Patient seen at beside, states she is feeling better, her breathing and cough have improved.  She states her chest is sore from coughing.          Constitutional: fevers, chills,  fatigue  Cardiovascular: chest pain, palpitations, PND, orthopnea, edema  Pulmonary: SOB, cough, sputum production, wheezing, chest tightness  Gastrointestinal: abdominal pain, nausea/vomiting, diarrhea, constipation, melena, hematochezia  Musculoskeletal: arthralgias, myalgias  Skin: rash, itching  Neurological: sensory changes, motor changes, headache  Psychiatric: mood changes      CURRENT INPATIENT MEDICATIONS:  Current Facility-Administered Medications   Medication Dose Route Frequency Provider Last Rate Last Admin    ipratropium (ATROVENT) 0.02 % nebulizer solution 0.5 mg  0.5 mg Nebulization TID RT Estevan Stewart MD        budesonide (PULMICORT) 1,000 mcg/2 mL nebulizer susp  1,000 mcg Nebulization BID RT Estevan Stewart MD        And    arformoteroL (BROVANA) neb solution 15 mcg  15 mcg Nebulization BID RT Estevan Stewart MD        aspirin delayed-release tablet 81 mg  81 mg Oral DAILY Cady Avelar MD   81 mg at 01/19/23 0916    lisinopriL (PRINIVIL, ZESTRIL) tablet 10 mg  10 mg Oral DAILY Cady Avelar MD   10 mg at 01/19/23 0916    montelukast (SINGULAIR) tablet 10 mg  10 mg Oral Ce Powers MD   10 mg at 01/19/23 2256    pantoprazole (PROTONIX) tablet 40 mg  40 mg Oral DAILY Cady Avelar MD   40 mg at 01/19/23 0916    simethicone (MYLICON) tablet 053 mg  120 mg Oral QID PRN Cady Avelar MD        insulin lispro (HUMALOG) injection   SubCUTAneous AC&HS Cady Avelar MD   8 Units at 01/20/23 0001    glucose chewable tablet 16 g  16 g Oral PRN Cady Avelar MD        glucagon (GLUCAGEN) injection 1 mg  1 mg IntraMUSCular PRN Cady Avelar MD        dextrose 10% infusion 0-250 mL  0-250 mL IntraVENous PRN Cady Avelar MD        sodium chloride (NS) flush 5-40 mL  5-40 mL IntraVENous Q8H Cady Avelar MD   10 mL at 01/20/23 0617    sodium chloride (NS) flush 5-40 mL  5-40 mL IntraVENous PRN Cady Avelar MD        acetaminophen (TYLENOL) tablet 650 mg 650 mg Oral Q6H PRN April Jarrell MD        Or    acetaminophen (TYLENOL) suppository 650 mg  650 mg Rectal Q6H PRN April Jarrell MD        ondansetron (ZOFRAN ODT) tablet 4 mg  4 mg Oral Q8H PRN April Jarrell MD        Or    [facility]ron [facility] injection 4 mg  4 mg IntraVENous Q6H PRN April Jarrell MD        enoxaparin (LOVENOX) injection 30 mg  30 mg SubCUTAneous BID April Jarrell MD   30 mg at 01/19/23 1737    polyethylene glycol (MIRALAX) packet 17 g  17 g Oral DAILY PRN April Jarrell MD        furosemide (LASIX) tablet 40 mg  40 mg Oral BID Romeo García, DO   40 mg at 01/19/23 1737    insulin glargine (LANTUS) injection 50 Units  50 Units SubCUTAneous QHS Romeo García, DO   50 Units at 01/19/23 2256    azithromycin (ZITHROMAX) tablet 250 mg  250 mg Oral Q MON, WED & FRI Romeo García, DO        albuterol/ipratropium (DUONEB) neb solution  1 Dose Nebulization Q4H PRN Haim Bowens MD        methylPREDNISolone (PF) (SOLU-MEDROL) injection 40 mg  40 mg IntraVENous Q8H Haim MOTLEY MD   40 mg at 01/20/23 4229    insulin glargine (LANTUS) injection 10 Units  10 Units SubCUTAneous DAILY Haim Bowens MD   10 Units at 01/19/23 1737       Allergies  Allergies   Allergen Reactions    Ancef [Cefazolin] Hives     Has prev tolerated ceftriaxone as well as pip/tazo and amox    Contrast Agent [Iodine] Anaphylaxis, Shortness of Breath and Swelling     Needs pre-medication for IV contrast with Benadryl, Solu-Medrol    Fish Containing Products Anaphylaxis     Pt states she had a severe allergic reaction at 9 y/o. Statins-Hmg-Coa Reductase Inhibitors Myalgia    Metformin Other (comments)     Abdominal pain, diarrhea.     Codeine Other (comments)     Altered mental status       OBJECTIVE:  No intake or output data in the 24 hours ending 01/20/23 0634    Visit Vitals  /76 (BP 1 Location: Right upper arm, BP Patient Position: At rest)   Pulse 91   Temp 98.1 °F (36.7 °C)   Resp 20   Ht 5' 3\" (1.6 m)   Wt 117.9 kg (260 lb)   SpO2 94%   Breastfeeding No   BMI 46.06 kg/m²       PHYSICAL EXAM  Gen: NAD, comfortable, obese   HEENT: normocephalic, atraumatic, MMM  CV: RRR, S1/S2 present without M/R/G  Pulm: CTAB, no wheezes, no crackles  Abd: S/NT/ND, no rebound  MSK: no clubbing, no edema  Skin: warm, dry, intact, no rash  Neuro: CN II-XII grossly intact, no focal deficits appreciated   Psych: appropriate, alert, oriented x3    LABWORK (LAST 24 HOURS)  Recent Results (from the past 24 hour(s))   METABOLIC PANEL, COMPREHENSIVE    Collection Time: 01/19/23  8:40 AM   Result Value Ref Range    Sodium 134 (L) 136 - 145 mmol/L    Potassium 5.1 3.5 - 5.5 mmol/L    Chloride 101 100 - 111 mmol/L    CO2 25 21 - 32 mmol/L    Anion gap 8 3.0 - 18 mmol/L    Glucose 246 (H) 74 - 99 mg/dL    BUN 16 7.0 - 18 MG/DL    Creatinine 0.97 0.6 - 1.3 MG/DL    BUN/Creatinine ratio 16 12 - 20      eGFR >60 >60 ml/min/1.73m2    Calcium 10.2 (H) 8.5 - 10.1 MG/DL    Bilirubin, total 0.9 0.2 - 1.0 MG/DL    ALT (SGPT) 39 13 - 56 U/L    AST (SGOT) 16 10 - 38 U/L    Alk.  phosphatase 82 45 - 117 U/L    Protein, total 7.5 6.4 - 8.2 g/dL    Albumin 3.9 3.4 - 5.0 g/dL    Globulin 3.6 2.0 - 4.0 g/dL    A-G Ratio 1.1 0.8 - 1.7     MAGNESIUM    Collection Time: 01/19/23  8:40 AM   Result Value Ref Range    Magnesium 2.1 1.6 - 2.6 mg/dL   CBC WITH AUTOMATED DIFF    Collection Time: 01/19/23  8:40 AM   Result Value Ref Range    WBC 7.9 4.6 - 13.2 K/uL    RBC 4.84 4.20 - 5.30 M/uL    HGB 13.9 12.0 - 16.0 g/dL    HCT 41.6 35.0 - 45.0 %    MCV 86.0 78.0 - 100.0 FL    MCH 28.7 24.0 - 34.0 PG    MCHC 33.4 31.0 - 37.0 g/dL    RDW 14.4 11.6 - 14.5 %    PLATELET 485 726 - 486 K/uL    MPV 8.8 (L) 9.2 - 11.8 FL    NRBC 0.0 0  WBC    ABSOLUTE NRBC 0.00 0.00 - 0.01 K/uL    NEUTROPHILS 75 (H) 40 - 73 %    LYMPHOCYTES 19 (L) 21 - 52 %    MONOCYTES 5 3 - 10 %    EOSINOPHILS 0 0 - 5 %    BASOPHILS 0 0 - 2 %    IMMATURE GRANULOCYTES 1 (H) 0.0 - 0.5 %    ABS. NEUTROPHILS 5.9 1.8 - 8.0 K/UL    ABS. LYMPHOCYTES 1.5 0.9 - 3.6 K/UL    ABS. MONOCYTES 0.4 0.05 - 1.2 K/UL    ABS. EOSINOPHILS 0.0 0.0 - 0.4 K/UL    ABS. BASOPHILS 0.0 0.0 - 0.1 K/UL    ABS. IMM.  GRANS. 0.0 0.00 - 0.04 K/UL    DF AUTOMATED     TROPONIN-HIGH SENSITIVITY    Collection Time: 01/19/23  8:40 AM   Result Value Ref Range    Troponin-High Sensitivity 6 0 - 54 ng/L   TSH 3RD GENERATION    Collection Time: 01/19/23  8:40 AM   Result Value Ref Range    TSH 0.67 0.36 - 3.74 uIU/mL   VITAMIN B12 & FOLATE    Collection Time: 01/19/23  8:40 AM   Result Value Ref Range    Vitamin B12 867 211 - 911 pg/mL    Folate 12.3 3.10 - 17.50 ng/mL   PROCALCITONIN    Collection Time: 01/19/23  8:40 AM   Result Value Ref Range    Procalcitonin <0.05 ng/mL   GLUCOSE, POC    Collection Time: 01/19/23  8:42 AM   Result Value Ref Range    Glucose (POC) 257 (H) 70 - 110 mg/dL   ECHO ADULT FOLLOW-UP OR LIMITED    Collection Time: 01/19/23 10:04 AM   Result Value Ref Range    IVSd 1.5 (A) 0.6 - 0.9 cm    LVIDd 4.7 3.9 - 5.3 cm    LVIDs 2.7 cm    LVOT Diameter 2.0 cm    LVPWd 1.1 (A) 0.6 - 0.9 cm    LVOT Peak Gradient 4 mmHg    LVOT Peak Velocity 1.0 m/s    AV Area by Peak Velocity 1.4 cm2    AV Peak Gradient 18 mmHg    AV Mean Gradient 10 mmHg    AV Peak Velocity 2.1 m/s    AV Mean Velocity 1.5 m/s    AV VTI 35.7 cm    MV A Velocity 1.29 m/s    MV E Wave Deceleration Time 187.1 ms    MV E Velocity 1.05 m/s    Fractional Shortening 2D 43 28 - 44 %    LVIDd Index 2.18 cm/m2    LVIDs Index 1.25 cm/m2    LV RWT Ratio 0.47     LV Mass 2D 237.9 (A) 67 - 162 g    LV Mass 2D Index 110.1 (A) 43 - 95 g/m2    MV E/A 0.81     LVOT Area 3.1 cm2    AV Velocity Ratio 0.48     TED/BSA Peak Velocity 0.6 cm2/m2   GLUCOSE, POC    Collection Time: 01/19/23 11:15 AM   Result Value Ref Range    Glucose (POC) 232 (H) 70 - 110 mg/dL   GLUCOSE, POC    Collection Time: 01/19/23  4:36 PM   Result Value Ref Range Glucose (POC) 346 (H) 70 - 110 mg/dL   GLUCOSE, POC    Collection Time: 01/19/23 10:54 PM   Result Value Ref Range    Glucose (POC) 314 (H) 70 - 110 mg/dL   GLUCOSE, POC    Collection Time: 01/19/23 10:55 PM   Result Value Ref Range    Glucose (POC) 309 (H) 70 - 110 mg/dL       IMAGING AND PROCEDURES (LAST 36 HOURS)  No results found.    ================================================================  Further management for Ms. Nate Hampton will be discussed on rounds with my attending.       Pankaj Olvera MD, PGY-1  [facility]  January 20, 2023 6:34 AM

## 2023-01-21 LAB
ALBUMIN SERPL-MCNC: 3.5 G/DL (ref 3.4–5)
ALBUMIN/GLOB SERPL: 0.9 (ref 0.8–1.7)
ALP SERPL-CCNC: 69 U/L (ref 45–117)
ALT SERPL-CCNC: 28 U/L (ref 13–56)
ANION GAP SERPL CALC-SCNC: 7 MMOL/L (ref 3–18)
AST SERPL-CCNC: 8 U/L (ref 10–38)
BILIRUB SERPL-MCNC: 0.5 MG/DL (ref 0.2–1)
BUN SERPL-MCNC: 35 MG/DL (ref 7–18)
BUN/CREAT SERPL: 28 (ref 12–20)
CALCIUM SERPL-MCNC: 9.4 MG/DL (ref 8.5–10.1)
CHLORIDE SERPL-SCNC: 98 MMOL/L (ref 100–111)
CO2 SERPL-SCNC: 28 MMOL/L (ref 21–32)
CREAT SERPL-MCNC: 1.25 MG/DL (ref 0.6–1.3)
ERYTHROCYTE [DISTWIDTH] IN BLOOD BY AUTOMATED COUNT: 14.1 % (ref 11.6–14.5)
GLOBULIN SER CALC-MCNC: 3.8 G/DL (ref 2–4)
GLUCOSE BLD STRIP.AUTO-MCNC: 251 MG/DL (ref 70–110)
GLUCOSE BLD STRIP.AUTO-MCNC: 260 MG/DL (ref 70–110)
GLUCOSE BLD STRIP.AUTO-MCNC: 282 MG/DL (ref 70–110)
GLUCOSE BLD STRIP.AUTO-MCNC: 336 MG/DL (ref 70–110)
GLUCOSE SERPL-MCNC: 304 MG/DL (ref 74–99)
HCT VFR BLD AUTO: 37.7 % (ref 35–45)
HGB BLD-MCNC: 12.8 G/DL (ref 12–16)
MCH RBC QN AUTO: 29.8 PG (ref 24–34)
MCHC RBC AUTO-ENTMCNC: 34 G/DL (ref 31–37)
MCV RBC AUTO: 87.7 FL (ref 78–100)
NRBC # BLD: 0 K/UL (ref 0–0.01)
NRBC BLD-RTO: 0 PER 100 WBC
PLATELET # BLD AUTO: 323 K/UL (ref 135–420)
PMV BLD AUTO: 9.2 FL (ref 9.2–11.8)
POTASSIUM SERPL-SCNC: 4.1 MMOL/L (ref 3.5–5.5)
PROT SERPL-MCNC: 7.3 G/DL (ref 6.4–8.2)
RBC # BLD AUTO: 4.3 M/UL (ref 4.2–5.3)
SODIUM SERPL-SCNC: 133 MMOL/L (ref 136–145)
WBC # BLD AUTO: 14 K/UL (ref 4.6–13.2)

## 2023-01-21 PROCEDURE — 74011250637 HC RX REV CODE- 250/637: Performed by: INTERNAL MEDICINE

## 2023-01-21 PROCEDURE — 74011636637 HC RX REV CODE- 636/637: Performed by: HOSPITALIST

## 2023-01-21 PROCEDURE — 36415 COLL VENOUS BLD VENIPUNCTURE: CPT

## 2023-01-21 PROCEDURE — 82962 GLUCOSE BLOOD TEST: CPT

## 2023-01-21 PROCEDURE — 74011000250 HC RX REV CODE- 250: Performed by: INTERNAL MEDICINE

## 2023-01-21 PROCEDURE — 94762 N-INVAS EAR/PLS OXIMTRY CONT: CPT

## 2023-01-21 PROCEDURE — 74011636637 HC RX REV CODE- 636/637: Performed by: STUDENT IN AN ORGANIZED HEALTH CARE EDUCATION/TRAINING PROGRAM

## 2023-01-21 PROCEDURE — 74011250636 HC RX REV CODE- 250/636: Performed by: STUDENT IN AN ORGANIZED HEALTH CARE EDUCATION/TRAINING PROGRAM

## 2023-01-21 PROCEDURE — 77010033678 HC OXYGEN DAILY

## 2023-01-21 PROCEDURE — 94640 AIRWAY INHALATION TREATMENT: CPT

## 2023-01-21 PROCEDURE — 74011000250 HC RX REV CODE- 250: Performed by: FAMILY MEDICINE

## 2023-01-21 PROCEDURE — 80053 COMPREHEN METABOLIC PANEL: CPT

## 2023-01-21 PROCEDURE — 74011250636 HC RX REV CODE- 250/636: Performed by: INTERNAL MEDICINE

## 2023-01-21 PROCEDURE — 74011250637 HC RX REV CODE- 250/637: Performed by: STUDENT IN AN ORGANIZED HEALTH CARE EDUCATION/TRAINING PROGRAM

## 2023-01-21 PROCEDURE — 85027 COMPLETE CBC AUTOMATED: CPT

## 2023-01-21 PROCEDURE — 74011636637 HC RX REV CODE- 636/637

## 2023-01-21 PROCEDURE — 94761 N-INVAS EAR/PLS OXIMETRY MLT: CPT

## 2023-01-21 PROCEDURE — 65270000046 HC RM TELEMETRY

## 2023-01-21 RX ORDER — PREDNISONE 20 MG/1
40 TABLET ORAL
Status: DISCONTINUED | OUTPATIENT
Start: 2023-01-21 | End: 2023-01-22 | Stop reason: HOSPADM

## 2023-01-21 RX ORDER — INSULIN GLARGINE 100 [IU]/ML
65 INJECTION, SOLUTION SUBCUTANEOUS
Status: DISCONTINUED | OUTPATIENT
Start: 2023-01-21 | End: 2023-01-22 | Stop reason: HOSPADM

## 2023-01-21 RX ADMIN — SODIUM CHLORIDE, PRESERVATIVE FREE 10 ML: 5 INJECTION INTRAVENOUS at 22:10

## 2023-01-21 RX ADMIN — IPRATROPIUM BROMIDE 0.5 MG: 0.5 SOLUTION RESPIRATORY (INHALATION) at 14:41

## 2023-01-21 RX ADMIN — METHYLPREDNISOLONE SODIUM SUCCINATE 20 MG: 40 INJECTION, POWDER, FOR SOLUTION INTRAMUSCULAR; INTRAVENOUS at 00:05

## 2023-01-21 RX ADMIN — METHYLPREDNISOLONE SODIUM SUCCINATE 20 MG: 40 INJECTION, POWDER, FOR SOLUTION INTRAMUSCULAR; INTRAVENOUS at 09:28

## 2023-01-21 RX ADMIN — ARFORMOTEROL TARTRATE 15 MCG: 15 SOLUTION RESPIRATORY (INHALATION) at 20:33

## 2023-01-21 RX ADMIN — SODIUM CHLORIDE, PRESERVATIVE FREE 10 ML: 5 INJECTION INTRAVENOUS at 00:01

## 2023-01-21 RX ADMIN — Medication 9 UNITS: at 09:29

## 2023-01-21 RX ADMIN — BUDESONIDE 1000 MCG: 1 SUSPENSION RESPIRATORY (INHALATION) at 07:54

## 2023-01-21 RX ADMIN — Medication 9 UNITS: at 11:30

## 2023-01-21 RX ADMIN — PANTOPRAZOLE SODIUM 40 MG: 40 TABLET, DELAYED RELEASE ORAL at 09:28

## 2023-01-21 RX ADMIN — PREDNISONE 40 MG: 20 TABLET ORAL at 11:00

## 2023-01-21 RX ADMIN — Medication 10 UNITS: at 09:29

## 2023-01-21 RX ADMIN — LISINOPRIL 10 MG: 10 TABLET ORAL at 09:28

## 2023-01-21 RX ADMIN — MONTELUKAST 10 MG: 10 TABLET, FILM COATED ORAL at 22:10

## 2023-01-21 RX ADMIN — ARFORMOTEROL TARTRATE 15 MCG: 15 SOLUTION RESPIRATORY (INHALATION) at 07:54

## 2023-01-21 RX ADMIN — BUDESONIDE 1000 MCG: 1 SUSPENSION RESPIRATORY (INHALATION) at 20:34

## 2023-01-21 RX ADMIN — FUROSEMIDE 40 MG: 40 TABLET ORAL at 09:27

## 2023-01-21 RX ADMIN — INSULIN GLARGINE 65 UNITS: 100 INJECTION, SOLUTION SUBCUTANEOUS at 22:11

## 2023-01-21 RX ADMIN — ENOXAPARIN SODIUM 30 MG: 100 INJECTION SUBCUTANEOUS at 17:29

## 2023-01-21 RX ADMIN — ASPIRIN 81 MG: 81 TABLET, COATED ORAL at 09:27

## 2023-01-21 RX ADMIN — FUROSEMIDE 40 MG: 40 TABLET ORAL at 17:29

## 2023-01-21 RX ADMIN — Medication 9 UNITS: at 22:13

## 2023-01-21 RX ADMIN — IPRATROPIUM BROMIDE 0.5 MG: 0.5 SOLUTION RESPIRATORY (INHALATION) at 20:33

## 2023-01-21 RX ADMIN — Medication 12 UNITS: at 16:30

## 2023-01-21 RX ADMIN — SODIUM CHLORIDE, PRESERVATIVE FREE 10 ML: 5 INJECTION INTRAVENOUS at 14:00

## 2023-01-21 RX ADMIN — IPRATROPIUM BROMIDE 0.5 MG: 0.5 SOLUTION RESPIRATORY (INHALATION) at 07:55

## 2023-01-21 RX ADMIN — ENOXAPARIN SODIUM 30 MG: 100 INJECTION SUBCUTANEOUS at 09:29

## 2023-01-21 NOTE — PROGRESS NOTES
[facility]  DAILY PROGRESS [facility]      Patient:    Nate Hampton , 60 y.o. female   MRN:  [id number]  Room/Bed:  201/01  Admission Date:   1/18/2023  Code status:  DNR    Reason for Admission: COPD exacerbation    ASSESSMENT AND PLAN:   Problem List Items Addressed This Visit          Respiratory    COPD exacerbation ([facility] 75.) - Primary    Relevant Medications    predniSONE (DELTASONE) 50 mg tablet    * (Principal) Acute exacerbation of chronic obstructive pulmonary disease (COPD) ([facility] 75.)    Relevant Medications    predniSONE (DELTASONE) 50 mg tablet     Other Visit Diagnoses       Parainfluenza type 1 infection                COPD Exacerbation 2/2 parainfluenza   -On 2L NC at home   -CXR (1/18): bibasilar atelectasis without evidence of acute cardiopulmonary process  -Procalcitonin: <0.05  -ABG (1/18): pH 7.38, pCO2 39.2, HCO3 23.3  -WBC 4.5>7.9>14.0  Plan:  -Azithromycin 250 mg three times a week  -Pulmicort and arformoterol  -Atrovent  -Methylprednisolone 20mg IV q8h.  -Montelukast   -Duonebs PRN  -Titrate O2 for pO2 of 88-92%. Currently on her baseline of 2L  -CPAP at night   -transition to oral steroids     -HFpEF  -NT pro-BNP: 39  -ECHO (1/19): EF 55-60%, normal left ventricular systolic function. Abnormal diastolic function. Plan:  -Lasix 40 mg BID     Hypertension  -Home med: lisinopril   Plan:  Lisinopril 10 mg daily     T2DM  -Home meds: 65 units nightly, 10 units in AM, and ssi 6-12 depending on home bg readings   -BG running high (>250), likely due to steroids  -Denies any hyperglycemic symptoms. Plan:  -Lantus increased to 65 units nightly, 10 in the AM   -Continue SSI for meals  -Hypoglycemia protocol      Code: DNR  Diet: Cardiac  DVT/AC: Lovenox 30 mg BID   Mobility: per protocol  Disposition: home    Point of Contact (relationship): Curt Alexander, daughter, [phone]      SUBJECTIVE:   Events of the last 24 hours:  No acute events overnight.   Patient seen at beside, states she is feeling better, her breathing and cough have improved. She is able to walk to the bath room without excessive SOB.           Constitutional: fevers, chills,  fatigue  Cardiovascular: chest pain, palpitations, PND, orthopnea, edema  Pulmonary: SOB, cough, sputum production, wheezing, chest tightness  Gastrointestinal: abdominal pain, nausea/vomiting, diarrhea, constipation, melena, hematochezia  Musculoskeletal: arthralgias, myalgias  Skin: rash, itching  Neurological: sensory changes, motor changes, headache  Psychiatric: mood changes      CURRENT INPATIENT MEDICATIONS:  Current Facility-Administered Medications   Medication Dose Route Frequency Provider Last Rate Last Admin    ipratropium (ATROVENT) 0.02 % nebulizer solution 0.5 mg  0.5 mg Nebulization TID RT Alexys Mcclendon MD   0.5 mg at 01/20/23 2057    budesonide (PULMICORT) 1,000 mcg/2 mL nebulizer susp  1,000 mcg Nebulization BID RT Alexys Mcclendon MD   1,000 mcg at 01/20/23 2057    And    arformoteroL (BROVANA) neb solution 15 mcg  15 mcg Nebulization BID RT Alexys Mcclendon MD   15 mcg at 01/20/23 2057    insulin lispro (HUMALOG) injection   SubCUTAneous AC&HS Silvestre Hartmann MD   9 Units at 01/20/23 2359    dextrose 10% infusion 0-250 mL  0-250 mL IntraVENous PRN Michele Parham MD        methylPREDNISolone (PF) (SOLU-MEDROL) injection 20 mg  20 mg IntraVENous Q8H Michele Parham MD   20 mg at 01/21/23 0005    aspirin delayed-release tablet 81 mg  81 mg Oral DAILY Thomas Terrell MD   81 mg at 01/20/23 0848    lisinopriL (PRINIVIL, ZESTRIL) tablet 10 mg  10 mg Oral DAILY Thomas Terrell MD   10 mg at 01/20/23 0848    montelukast (SINGULAIR) tablet 10 mg  10 mg Oral Lee Ann Lopes MD   10 mg at 01/20/23 2356    pantoprazole (PROTONIX) tablet 40 mg  40 mg Oral DAILY Thomas Terrell MD   40 mg at 01/20/23 0848    simethicone (MYLICON) tablet 084 mg  120 mg Oral QID PRN Thomas Terrell MD        glucose chewable tablet 16 g  16 g Oral PRN Vinh Burkett MD        glucagon [facility]) injection 1 mg  1 mg IntraMUSCular PRN Vinh Burkett MD        dextrose 10% infusion 0-250 mL  0-250 mL IntraVENous PRN iVnh Burkett MD        sodium chloride (NS) flush 5-40 mL  5-40 mL IntraVENous Q8H Vinh Burkett MD   10 mL at 01/21/23 0001    sodium chloride (NS) flush 5-40 mL  5-40 mL IntraVENous PRN Vinh Burkett MD        acetaminophen (TYLENOL) tablet 650 mg  650 mg Oral Q6H PRN Vinh Burkett MD   650 mg at 01/20/23 1731    Or    acetaminophen (TYLENOL) suppository 650 mg  650 mg Rectal Q6H PRN Vinh Burkett MD        ondansetron (ZOFRAN ODT) tablet 4 mg  4 mg Oral Q8H PRN Vinh Burkett MD        Or    ondansetron [facility]) injection 4 mg  4 mg IntraVENous Q6H PRN Vnih Burkett MD        enoxaparin (LOVENOX) injection 30 mg  30 mg SubCUTAneous BID Vinh Burkett MD   30 mg at 01/20/23 1725    polyethylene glycol (MIRALAX) packet 17 g  17 g Oral DAILY PRN Vinh Burkett MD        furosemide (LASIX) tablet 40 mg  40 mg Oral BID Romeo García, DO   40 mg at 01/20/23 1725    insulin glargine (LANTUS) injection 50 Units  50 Units SubCUTAneous QHS Romeo García, DO   50 Units at 01/20/23 2357    azithromycin (ZITHROMAX) tablet 250 mg  250 mg Oral Q MON, WED & FRI Romeo García, DO   250 mg at 01/20/23 2356    albuterol/ipratropium (DUONEB) neb solution  1 Dose Nebulization Q4H PRN Randy West MD        insulin glargine (LANTUS) injection 10 Units  10 Units SubCUTAneous DAILY Randy West MD   10 Units at 01/20/23 7928       Allergies  Allergies   Allergen Reactions    Ancef [Cefazolin] Hives     Has prev tolerated ceftriaxone as well as pip/tazo and amox    Contrast Agent [Iodine] Anaphylaxis, Shortness of Breath and Swelling     Needs pre-medication for IV contrast with Benadryl, Solu-Medrol    Fish Containing Products Anaphylaxis     Pt states she had a severe allergic reaction at 9 y/o.     Statins-Hmg-Coa Reductase Inhibitors Myalgia    Metformin Other (comments)     Abdominal pain, diarrhea. Codeine Other (comments)     Altered mental status       OBJECTIVE:  No intake or output data in the 24 hours ending 01/21/23 0532    Visit Vitals  /60 (BP 1 Location: Right upper arm, BP Patient Position: At rest)   Pulse 78   Temp 97.5 °F (36.4 °C)   Resp 19   Ht 5' 3\" (1.6 m)   Wt 117.9 kg (260 lb)   SpO2 93%   Breastfeeding No   BMI 46.06 kg/m²       PHYSICAL EXAM  Gen: NAD, comfortable, obese   HEENT: normocephalic, atraumatic, MMM  CV: RRR, S1/S2 present without M/R/G  Pulm: CTAB, slight wheezing, no crackles  Abd: S/NT/ND, no rebound  MSK: no clubbing, no edema  Skin: warm, dry, intact, no rash  Neuro: CN II-XII grossly intact, no focal deficits appreciated   Psych: appropriate, alert, oriented x3    LABWORK (LAST 24 HOURS)  Recent Results (from the past 24 hour(s))   GLUCOSE, POC    Collection Time: 01/20/23  8:16 AM   Result Value Ref Range    Glucose (POC) 284 (H) 70 - 760 mg/dL   METABOLIC PANEL, COMPREHENSIVE    Collection Time: 01/20/23 10:58 AM   Result Value Ref Range    Sodium 132 (L) 136 - 145 mmol/L    Potassium 4.6 3.5 - 5.5 mmol/L    Chloride 97 (L) 100 - 111 mmol/L    CO2 25 21 - 32 mmol/L    Anion gap 10 3.0 - 18 mmol/L    Glucose 388 (H) 74 - 99 mg/dL    BUN 28 (H) 7.0 - 18 MG/DL    Creatinine 1.29 0.6 - 1.3 MG/DL    BUN/Creatinine ratio 22 (H) 12 - 20      eGFR 47 (L) >60 ml/min/1.73m2    Calcium 9.9 8.5 - 10.1 MG/DL    Bilirubin, total 0.5 0.2 - 1.0 MG/DL    ALT (SGPT) 31 13 - 56 U/L    AST (SGOT) 6 (L) 10 - 38 U/L    Alk.  phosphatase 76 45 - 117 U/L    Protein, total 7.4 6.4 - 8.2 g/dL    Albumin 3.8 3.4 - 5.0 g/dL    Globulin 3.6 2.0 - 4.0 g/dL    A-G Ratio 1.1 0.8 - 1.7     CBC W/O DIFF    Collection Time: 01/20/23 10:58 AM   Result Value Ref Range    WBC 14.0 (H) 4.6 - 13.2 K/uL    RBC 4.54 4.20 - 5.30 M/uL    HGB 13.3 12.0 - 16.0 g/dL    HCT 38.8 35.0 - 45.0 %    MCV 85.5 78.0 - 100.0 FL    MCH 29.3 24.0 - 34.0 PG    MCHC 34.3 31.0 - 37.0 g/dL    RDW 14.4 11.6 - 14.5 %    PLATELET 264 673 - 258 K/uL    MPV 9.1 (L) 9.2 - 11.8 FL    NRBC 0.0 0  WBC    ABSOLUTE NRBC 0.00 0.00 - 0.01 K/uL   GLUCOSE, POC    Collection Time: 01/20/23 11:02 AM   Result Value Ref Range    Glucose (POC) 390 (H) 70 - 110 mg/dL   GLUCOSE, POC    Collection Time: 01/20/23  4:02 PM   Result Value Ref Range    Glucose (POC) 276 (H) 70 - 110 mg/dL   GLUCOSE, POC    Collection Time: 01/20/23 10:40 PM   Result Value Ref Range    Glucose (POC) 258 (H) 70 - 110 mg/dL       IMAGING AND PROCEDURES (LAST 36 HOURS)  No results found.    ================================================================  Further management for Ms. Tianna Atkins will be discussed on rounds with my attending.       Debra Lala DO, PGY-1  [facility]  January 21, 2023 6:34 AM

## 2023-01-21 NOTE — PROGRESS NOTES
This RT noted order for CPAP. Patient has Home Trilogy machine in room and stated she has been using that.

## 2023-01-21 NOTE — PROGRESS NOTES
[facility]   PM Check Note:    Assessment & Plan:  -No changes in manegement, refer to daily PN for A&P.  -Patients initial med rec aligned with my med rec. No change in medication warranted. Subjective:  ~1930: Patient reports feeling much better. She is able to to walk to the bathroom without becoming too winded. This is much improvement from yesterday. She tries to stay sitting up because laying down worsens her shortness of breath. Patient is currently on 2 L nasal cannula which is what she is on at home. She is maintaining an SpO2 >90% on 2L NC. Her BG remains >250, but she denies any hyperglycemic symptoms. She believes that she is approaching her baseline. Patient denies any chest pain, fever, chills, dizziness, but does endorse SOB (but much improved). Objective:  Visit Vitals  /76 (BP 1 Location: Right upper arm, BP Patient Position: At rest)   Pulse 78   Temp 98.3 °F (36.8 °C)   Resp 19   Ht 5' 3\" (1.6 m)   Wt 117.9 kg (260 lb)   SpO2 96%   Breastfeeding No   BMI 46.06 kg/m²       Physical Exam:  General: Well-appearing, NAD. HEENT: Normocephalic, atraumatic  CV:  RRR, no M/G/R.  RESP: Unlabored breathing. Equal expansion BL. Diffuse wheezing appreciated. ABD:  Soft, nontender, nondistended. No hepatosplenomegaly. MS:  No joint deformity or instability. No atrophy. Neuro: A+Ox3. CN2-12 grossly intact  Ext:  No edema. 2+ radial pulse. Skin: Warm & dry. No rashes, lesions, or ulcers. Good turgor. Psych: Appropriate mood and affect. The above patient and plan were discussed with my supervising physician. See daily progress note for full assessment/plan.       Piotr Dumont DO, PGY-1  [facility]  1/20/2023, 10:30 PM

## 2023-01-22 VITALS
TEMPERATURE: 97.2 F | WEIGHT: 260 LBS | DIASTOLIC BLOOD PRESSURE: 83 MMHG | RESPIRATION RATE: 18 BRPM | HEIGHT: 63 IN | SYSTOLIC BLOOD PRESSURE: 144 MMHG | BODY MASS INDEX: 46.07 KG/M2 | OXYGEN SATURATION: 93 % | HEART RATE: 69 BPM

## 2023-01-22 LAB
ALBUMIN SERPL-MCNC: 3.3 G/DL (ref 3.4–5)
ALBUMIN/GLOB SERPL: 0.9 (ref 0.8–1.7)
ALP SERPL-CCNC: 63 U/L (ref 45–117)
ALT SERPL-CCNC: 28 U/L (ref 13–56)
ANION GAP SERPL CALC-SCNC: 8 MMOL/L (ref 3–18)
AST SERPL-CCNC: 11 U/L (ref 10–38)
BILIRUB SERPL-MCNC: 0.7 MG/DL (ref 0.2–1)
BUN SERPL-MCNC: 27 MG/DL (ref 7–18)
BUN/CREAT SERPL: 28 (ref 12–20)
CALCIUM SERPL-MCNC: 9.3 MG/DL (ref 8.5–10.1)
CHLORIDE SERPL-SCNC: 101 MMOL/L (ref 100–111)
CO2 SERPL-SCNC: 27 MMOL/L (ref 21–32)
CREAT SERPL-MCNC: 0.95 MG/DL (ref 0.6–1.3)
GLOBULIN SER CALC-MCNC: 3.7 G/DL (ref 2–4)
GLUCOSE BLD STRIP.AUTO-MCNC: 138 MG/DL (ref 70–110)
GLUCOSE BLD STRIP.AUTO-MCNC: 217 MG/DL (ref 70–110)
GLUCOSE SERPL-MCNC: 149 MG/DL (ref 74–99)
POTASSIUM SERPL-SCNC: 3.9 MMOL/L (ref 3.5–5.5)
PROT SERPL-MCNC: 7 G/DL (ref 6.4–8.2)
SODIUM SERPL-SCNC: 136 MMOL/L (ref 136–145)

## 2023-01-22 PROCEDURE — 80053 COMPREHEN METABOLIC PANEL: CPT

## 2023-01-22 PROCEDURE — 94761 N-INVAS EAR/PLS OXIMETRY MLT: CPT

## 2023-01-22 PROCEDURE — 82962 GLUCOSE BLOOD TEST: CPT

## 2023-01-22 PROCEDURE — 77010033678 HC OXYGEN DAILY

## 2023-01-22 PROCEDURE — 74011636637 HC RX REV CODE- 636/637: Performed by: HOSPITALIST

## 2023-01-22 PROCEDURE — 74011250636 HC RX REV CODE- 250/636: Performed by: INTERNAL MEDICINE

## 2023-01-22 PROCEDURE — 36415 COLL VENOUS BLD VENIPUNCTURE: CPT

## 2023-01-22 PROCEDURE — 74011000250 HC RX REV CODE- 250: Performed by: FAMILY MEDICINE

## 2023-01-22 PROCEDURE — 74011250637 HC RX REV CODE- 250/637: Performed by: STUDENT IN AN ORGANIZED HEALTH CARE EDUCATION/TRAINING PROGRAM

## 2023-01-22 PROCEDURE — 74011000250 HC RX REV CODE- 250: Performed by: INTERNAL MEDICINE

## 2023-01-22 PROCEDURE — 74011250637 HC RX REV CODE- 250/637: Performed by: INTERNAL MEDICINE

## 2023-01-22 PROCEDURE — 74011636637 HC RX REV CODE- 636/637: Performed by: STUDENT IN AN ORGANIZED HEALTH CARE EDUCATION/TRAINING PROGRAM

## 2023-01-22 PROCEDURE — 94640 AIRWAY INHALATION TREATMENT: CPT

## 2023-01-22 RX ORDER — PREDNISONE 10 MG/1
TABLET ORAL
Qty: 30 TABLET | Refills: 0 | Status: SHIPPED | OUTPATIENT
Start: 2023-01-22 | End: 2023-02-06

## 2023-01-22 RX ORDER — IPRATROPIUM BROMIDE AND ALBUTEROL SULFATE 2.5; .5 MG/3ML; MG/3ML
3 SOLUTION RESPIRATORY (INHALATION)
Qty: 1 EACH | Refills: 0 | Status: SHIPPED | OUTPATIENT
Start: 2023-01-22

## 2023-01-22 RX ORDER — INSULIN LISPRO 100 [IU]/ML
INJECTION, SOLUTION INTRAVENOUS; SUBCUTANEOUS
Qty: 1 EACH | Refills: 1 | Status: SHIPPED
Start: 2023-01-22

## 2023-01-22 RX ADMIN — FUROSEMIDE 40 MG: 40 TABLET ORAL at 10:39

## 2023-01-22 RX ADMIN — IPRATROPIUM BROMIDE 0.5 MG: 0.5 SOLUTION RESPIRATORY (INHALATION) at 13:46

## 2023-01-22 RX ADMIN — Medication 6 UNITS: at 11:30

## 2023-01-22 RX ADMIN — LISINOPRIL 10 MG: 10 TABLET ORAL at 10:39

## 2023-01-22 RX ADMIN — ENOXAPARIN SODIUM 30 MG: 100 INJECTION SUBCUTANEOUS at 10:39

## 2023-01-22 RX ADMIN — BUDESONIDE 1000 MCG: 1 SUSPENSION RESPIRATORY (INHALATION) at 09:06

## 2023-01-22 RX ADMIN — Medication 10 UNITS: at 10:44

## 2023-01-22 RX ADMIN — ASPIRIN 81 MG: 81 TABLET, COATED ORAL at 10:39

## 2023-01-22 RX ADMIN — IPRATROPIUM BROMIDE 0.5 MG: 0.5 SOLUTION RESPIRATORY (INHALATION) at 09:05

## 2023-01-22 RX ADMIN — SODIUM CHLORIDE, PRESERVATIVE FREE 10 ML: 5 INJECTION INTRAVENOUS at 14:00

## 2023-01-22 RX ADMIN — ARFORMOTEROL TARTRATE 15 MCG: 15 SOLUTION RESPIRATORY (INHALATION) at 09:06

## 2023-01-22 RX ADMIN — SODIUM CHLORIDE, PRESERVATIVE FREE 10 ML: 5 INJECTION INTRAVENOUS at 15:40

## 2023-01-22 RX ADMIN — PANTOPRAZOLE SODIUM 40 MG: 40 TABLET, DELAYED RELEASE ORAL at 10:39

## 2023-01-22 RX ADMIN — PREDNISONE 40 MG: 20 TABLET ORAL at 10:39

## 2023-01-22 NOTE — PROGRESS NOTES
Discharge/Transition Planning     Noted Dc order. No CM orders at this time. Lives with daughter in home. Has rollator, oxygen.  Has personal care       Everton Teran RN BSN  /Discharge Planner

## 2023-01-22 NOTE — PROGRESS NOTES
[facility]  DAILY PROGRESS NOTE      Patient:    Carlos Duncan , 60 y.o. female   MRN:  [id number]  Room/Bed:  201/01  Admission Date:   1/18/2023  Code status:  DNR    Reason for Admission: COPD exacerbation    ASSESSMENT AND PLAN:   Problem List Items Addressed This Visit          Respiratory    COPD exacerbation ([facility] - Primary    Relevant Medications    predniSONE (DELTASONE) 50 mg tablet    * (Principal) Acute exacerbation of chronic obstructive pulmonary disease (COPD) ([facility]    Relevant Medications    predniSONE (DELTASONE) 50 mg tablet     Other Visit Diagnoses       Parainfluenza type 1 infection              COPD Exacerbation 2/2 parainfluenza   -On 2L NC at home   -CXR (1/18): bibasilar atelectasis without evidence of acute cardiopulmonary process  -Procalcitonin: <0.05  -ABG (1/18): pH 7.38, pCO2 39.2, HCO3 23.3  -WBC 4.5>7.9>14.0  Plan:  -Azithromycin 250 mg three times a week  -Pulmicort and arformoterol  -Atrovent  -Methylprednisolone 20mg IV q8h.  -Montelukast   -Duonebs PRN  -Titrate O2 for pO2 of 88-92%. Currently on her baseline of 2L  -CPAP at night   -transition to oral steroids      -HFpEF  -NT pro-BNP: 39  -ECHO (1/19): EF 55-60%, normal left ventricular systolic function. Abnormal diastolic function. Plan:  -Lasix 40 mg BID      Hypertension  -Home med: lisinopril   Plan:  Lisinopril 10 mg daily      T2DM  -Home meds: 65 units nightly, 10 units in AM, and ssi 6-12 depending on home bg readings   -BG running high (>250), likely due to steroids  -Denies any hyperglycemic symptoms. Plan:  -Lantus increased to 65 units nightly, 10 in the AM   -Continue SSI for meals  -Hypoglycemia protocol     Code: DNR  Diet: Cardiac  DVT/AC: Lovenox 30 mg BID   Mobility: per protocol  Disposition: home     Point of Contact (relationship): Nazia Martinez, daughter, [phone]       SUBJECTIVE:   Events of the last 24 hours:  No acute events overnight. Patient seen at beside.  No acute complaints.      ROS (positive findings are in BOLD; negative findings are in regular font)  Constitutional: fevers, chills, fatigue  HEENT: changes in vision, sore throat, dysphagia  Cardiovascular: chest pain, palpitations  Pulmonary: SOB, cough, sputum production, wheezing, chest tightness  Gastrointestinal: abdominal pain, nausea/vomiting, diarrhea, constipation  Genitourinary: dysuria  Musculoskeletal: arthralgias, myalgias  Skin: rash, itching  Neurological: earache  Psychiatric: mood changes    CURRENT INPATIENT MEDICATIONS:  Current Facility-Administered Medications   Medication Dose Route Frequency Provider Last Rate Last Admin    insulin glargine (LANTUS) injection 65 Units  65 Units SubCUTAneous QHS Trudi Lenz MD   65 Units at 01/21/23 2211    predniSONE (DELTASONE) tablet 40 mg  40 mg Oral DAILY WITH BREAKFAST Marin Parham MD   40 mg at 01/21/23 1100    ipratropium (ATROVENT) 0.02 % nebulizer solution 0.5 mg  0.5 mg Nebulization TID RT Rafat Johnson MD   0.5 mg at 01/21/23 2033    budesonide (PULMICORT) 1,000 mcg/2 mL nebulizer susp  1,000 mcg Nebulization BID RT Rafat Johnson MD   1,000 mcg at 01/21/23 2034    And    arformoteroL (BROVANA) neb solution 15 mcg  15 mcg Nebulization BID RT Rafat Johnson MD   15 mcg at 01/21/23 2033    insulin lispro (HUMALOG) injection   SubCUTAneous AC&HS Carlos Charlton MD   9 Units at 01/21/23 2213    dextrose 10% infusion 0-250 mL  0-250 mL IntraVENous PRN Marin Parham MD        aspirin delayed-release tablet 81 mg  81 mg Oral DAILY Smith Leiva MD   81 mg at 01/21/23 0927    lisinopriL (PRINIVIL, ZESTRIL) tablet 10 mg  10 mg Oral DAILY Smith Leiva MD   10 mg at 01/21/23 0928    montelukast (SINGULAIR) tablet 10 mg  10 mg Oral Hanna Muhammad MD   10 mg at 01/21/23 2210    pantoprazole (PROTONIX) tablet 40 mg  40 mg Oral DAILY Smith Leiva MD   40 mg at 01/21/23 0928    simethicone (MYLICON) tablet 880 mg  120 mg Oral QID PRN Moses Haider MD        glucose chewable tablet 16 g  16 g Oral PRN Moses Haider MD        glucagon [facility]) injection 1 mg  1 mg IntraMUSCular PRN Moses Haider MD        dextrose 10% infusion 0-250 mL  0-250 mL IntraVENous PRN Moses Haider MD        sodium chloride (NS) flush 5-40 mL  5-40 mL IntraVENous Q8H Moses Haider MD   10 mL at 01/21/23 2210    sodium chloride (NS) flush 5-40 mL  5-40 mL IntraVENous PRN Moses Haider MD        acetaminophen (TYLENOL) tablet 650 mg  650 mg Oral Q6H PRN Moses Haider MD   650 mg at 01/20/23 1731    Or    acetaminophen (TYLENOL) suppository 650 mg  650 mg Rectal Q6H PRN Moses Haider MD        ondansetron (ZOFRAN ODT) tablet 4 mg  4 mg Oral Q8H PRN Moses Haider MD        Or    ondansetron [facility]) injection 4 mg  4 mg IntraVENous Q6H PRN Moses Haider MD        enoxaparin (LOVENOX) injection 30 mg  30 mg SubCUTAneous BID Moses Haider MD   30 mg at 01/21/23 1729    polyethylene glycol (MIRALAX) packet 17 g  17 g Oral DAILY PRN Moses Haider MD        furosemide (LASIX) tablet 40 mg  40 mg Oral BID Romeo García, DO   40 mg at 01/21/23 1729    azithromycin (ZITHROMAX) tablet 250 mg  250 mg Oral Q MON, WED & FRI Romeo García, DO   250 mg at 01/20/23 6546    albuterol/ipratropium (DUONEB) neb solution  1 Dose Nebulization Q4H PRN Sánchez Vicente MD        insulin glargine (LANTUS) injection 10 Units  10 Units SubCUTAneous DAILY Sánchez Vicente MD   10 Units at 01/21/23 8150       Allergies  Allergies   Allergen Reactions    Ancef [Cefazolin] Hives     Has prev tolerated ceftriaxone as well as pip/tazo and amox    Contrast Agent [Iodine] Anaphylaxis, Shortness of Breath and Swelling     Needs pre-medication for IV contrast with Benadryl, Solu-Medrol    Fish Containing Products Anaphylaxis     Pt states she had a severe allergic reaction at 7 y/o.     Statins-Hmg-Coa Reductase Inhibitors Myalgia    Metformin Other (comments) Abdominal pain, diarrhea. Codeine Other (comments)     Altered mental status       OBJECTIVE:    Intake/Output Summary (Last 24 hours) at 1/22/2023 0606  Last data filed at 1/21/2023 1926  Gross per 24 hour   Intake 960 ml   Output --   Net 960 ml       Visit Vitals  BP (!) 144/83 (BP 1 Location: Right upper arm, BP Patient Position: Sitting)   Pulse 69   Temp 97.2 °F (36.2 °C)   Resp 18   Ht 5' 3\" (1.6 m)   Wt 117.9 kg (260 lb)   SpO2 96%   Breastfeeding No   BMI 46.06 kg/m²       PHYSICAL EXAM  Gen: NAD, comfortable, obese   HEENT: normocephalic, atraumatic, MMM  CV: RRR, S1/S2 present without M/R/G  Pulm: CTAB, slight wheezing, no crackles  Abd: , normal bowel sounds,   MSK: no clubbing, no edema  Skin: warm, dry, intact, no rash  Neuro: CN II-XII grossly intact, no focal deficits appreciated   Psych: appropriate, alert, oriented x3    LABWORK (LAST 24 HOURS)  Recent Results (from the past 24 hour(s))   GLUCOSE, POC    Collection Time: 01/21/23  8:28 AM   Result Value Ref Range    Glucose (POC) 260 (H) 70 - 110 mg/dL   GLUCOSE, POC    Collection Time: 01/21/23 11:56 AM   Result Value Ref Range    Glucose (POC) 282 (H) 70 - 110 mg/dL   GLUCOSE, POC    Collection Time: 01/21/23  4:40 PM   Result Value Ref Range    Glucose (POC) 336 (H) 70 - 110 mg/dL   GLUCOSE, POC    Collection Time: 01/21/23  9:18 PM   Result Value Ref Range    Glucose (POC) 251 (H) 70 - 110 mg/dL       IMAGING AND PROCEDURES (LAST 36 HOURS)  No results found.    ================================================================  Further management for Ms. Gely Mejia will be discussed on rounds with my attending.       Warner Bonner MD, PGY-2  [facility]  January 22, 2023 6:06 AM

## 2023-01-22 NOTE — DISCHARGE SUMMARY
[facility] SUMMARY      Name:   Gely Mejia 60 y.o. female  MRN:   [id number]  CSN:   [id number]  Admission Date:  1/18/2023  Discharge Date:  1/22/23  Attending:             Eliz Pedroza MD   PCP:              Myrna Urrutia MD   ================================================================  Reason for Admission:  Acute exacerbation of chronic obstructive pulmonary disease (COPD) ([facility] [J44.1]    Discharge Diagnosis:    COPD Exacerbation secondary to Parainfluenza 1  History of COPD-Asthma Cross Over Syndrome  SHERRIE  DMII  CHF  CAD    Follow-up studies/evaluations for PCP/Important Notes to PCP:  Video visit HFU at 2pm on 1/24 due to Parainfluenza  Ensure pt has refills  Pending labs/investigations to follow up as below  Medication reconciliation:  Discontinued Medications: None  Sending home on Prednisone Taper: 30 mg for 5 days, then 20 mg for 5 days, then 10 mg for 5 days, then resume chronic prednisone dosing of 5 mg daily  Pt to increase insulin to 10 units QAM for first 5 days of steroid taper, then return to 5units QAM.   Continue MWF azithromycin  Discussed med changes w/ patient. She understands how to adjust insulin for prednisone taper.      YOANA Follow Up Appointment:   Follow-up Information       Follow up With Specialties Details Why Contact Info    Myrna Urrutia MD Family Medicine In 2 days  [address] [facility]  [phone]      Clarence Castellanos MD Family Medicine Go on 1/24/2023 1/24 at 2pm w/ Dr. Sheri Stratton [address]  [phone]                 Readmission prevention plan:   Continue home inhalers  Slow taper prednisone  F/U w/ pulm    GOALS OF CARE (including Code Status, Advanced Care Plan):   DNR/DNI    Pending labs/ investigations at discharge to follow up:   None      Condition at discharge: Afebrile  Ambulating  Eating, Drinking, Voiding  Stable    Disposition at Discharge:  Home    Functional Status at Discharge: Ambulates with rolling walker    Diet: diabetic diet    Discharge Medications:    Current Discharge Medication List        START taking these medications    Details   ! ! predniSONE (DELTASONE) 10 mg tablet Take 30 mg by mouth daily (with breakfast) for 5 days, THEN 20 mg daily (with breakfast) for 5 days, THEN 10 mg daily (with breakfast) for 5 days. Qty: 30 Tablet, Refills: 0  Start date: 1/22/2023, End date: 2/6/2023      insulin lispro (HUMALOG) 100 unit/mL injection 6-12 units based on blood glucose  Qty: 1 Each, Refills: 1  Start date: 1/22/2023      !! predniSONE (DELTASONE) 50 mg tablet Take 1 Tablet by mouth daily for 5 days. Qty: 5 Tablet, Refills: 0  Start date: 1/18/2023, End date: 1/23/2023       !! - Potential duplicate medications found. Please discuss with provider. CONTINUE these medications which have CHANGED    Details   albuterol-ipratropium (DUO-NEB) 2.5 mg-0.5 mg/3 ml nebu 3 mL by Nebulization route every four (4) hours as needed for Wheezing. Qty: 1 Each, Refills: 0  Start date: 1/22/2023           CONTINUE these medications which have NOT CHANGED    Details   !! insulin glargine (LANTUS,BASAGLAR) 100 unit/mL (3 mL) inpn 65 Units by SubCUTAneous route nightly. !! insulin glargine (LANTUS,BASAGLAR) 100 unit/mL (3 mL) inpn 10 Units by SubCUTAneous route Every morning. lancets (Accu-Chek Softclix Lancets) misc Please use one lancet every time you check your blood sugar  Qty: 100 Each, Refills: 11      montelukast (SINGULAIR) 10 mg tablet Take 1 tablet by mouth nightly  Qty: 90 Tablet, Refills: 0      fluticasone propion-salmeteroL (Advair HFA) 230-21 mcg/actuation inhaler Take 2 Puffs by inhalation two (2) times a day. Rinse and gargle after each use. Disp: 3 month  Qty: 3 Each, Refills: 3    Associated Diagnoses: Asthma-COPD overlap syndrome ([facility]  Poorly controlled severe persistent asthma with acute exacerbation budesonide (PULMICORT) 1 mg/2 mL nbsp Take 2 mL by inhalation daily. Qty: 90 Each, Refills: 3    Associated Diagnoses: Asthma-COPD overlap syndrome ([facility] Poorly controlled severe persistent asthma with acute exacerbation      cholecalciferol (VITAMIN D3) (2,000 UNITS /50 MCG) cap capsule Take 5,000 Units by mouth every seven (7) days. azelastine (ASTELIN) 137 mcg (0.1 %) nasal spray 2 Sprays by Both Nostrils route as needed. Spiriva Respimat 2.5 mcg/actuation inhaler INHALE 2 SPRAY(S) BY MOUTH ONCE DAILY  Qty: 12 g, Refills: 3      azithromycin (ZITHROMAX) 250 mg tablet TAKE 1 TABLET BY MOUTH ON MONDAY, WEDNESDAY AND FRIDAY  Qty: 30 Tablet, Refills: 0    Associated Diagnoses: COPD, group D, by GOLD 2017 classification ([facility]      polyethylene glycol (MIRALAX) 17 gram/dose powder Take 17 g by mouth as needed. furosemide (LASIX) 40 mg tablet Take 1 tablet by mouth twice daily  Qty: 60 Tablet, Refills: 0      fluticasone propionate (FLONASE) 50 mcg/actuation nasal spray 2 Sprays by Both Nostrils route daily. Qty: 3 Each, Refills: 3      Insulin Needles, Disposable, 31 gauge x 3/16\" ndle Use with Basaglar once daily. Insulin Syringe-Needle U-100 0.3 mL 31 gauge x 5/16\" syrg USE AS DIRECTED      albuterol (PROVENTIL HFA, VENTOLIN HFA, PROAIR HFA) 90 mcg/actuation inhaler Take 2 Puffs by inhalation every four (4) hours as needed for Wheezing. Qty: 1 Inhaler, Refills: 0      omeprazole (PRILOSEC) 40 mg capsule Take 40 mg by mouth daily. lisinopril (PRINIVIL, ZESTRIL) 20 mg tablet Take 10 mg by mouth daily. aspirin delayed-release 81 mg tablet Take 81 mg by mouth daily. famotidine (PEPCID) 20 mg tablet Take 20 mg by mouth as needed for Heartburn or Gastroesophageal Reflux Disease (GERD). As needed for breakthrough heartburn      potassium chloride (K-DUR, KLOR-CON M20) 20 mEq tablet Take 20 mEq by mouth daily.       EPINEPHrine (EPIPEN) 0.3 mg/0.3 mL injection 0.3 mg by IntraMUSCular route once as needed. insulin lispro (HUMALOG) 100 unit/mL kwikpen 6-12 Units. simethicone (GAS-X) 125 mg capsule Take 125 mg by mouth four (4) times daily as needed for Flatulence. Blood-Glucose Meter monitoring kit CHECK BLOOD SUGARS B.I.D. E11.9      acetaminophen (TYLENOL) 500 mg tablet Take 1,000 mg by mouth every six (6) hours as needed for Fever or Pain. OXYGEN-AIR DELIVERY SYSTEMS 2 L by Nasal route continuous. First Choice between 2L and 3L       !! - Potential duplicate medications found. Please discuss with provider. Hospital Course:     Elfego Miranda is a 60 y.o. female with medical comorbidities including COPD (O2 at home 2-3L at baseline), asthma, T2DM, HLD, CHF, CAD, SHERRIE, GERD, anxiety, arthritis, osteoporosis who presented to  [facility] 1/18/[facility] w/ 3 days of SOB. Had sore throat, then SOB and cough w/ green phlegm. Had difficulty completing sentences at that time. In ED, found to have Parainfluenza. CXR no evidence of pneumonia. Gave breathing tx Q4, mag, prednisone 40mg every day. Gradually progressed to her baseline w/ supportive care. Sent home w/ steroid taper. Patient's problem list was managed in hospital as stated below:        COPD Exacerbation 2/2 parainfluenza   -On 2L NC at home   -CXR (1/18): bibasilar atelectasis without evidence of acute cardiopulmonary process  -Procalcitonin: <0.05  -ABG (1/18): pH 7.38, pCO2 39.2, HCO3 23.3  -WBC 4.5>7.9>14.0  Plan:  -Azithromycin 250 mg three times a week  -Pulmicort and arformoterol  -Atrovent, Montelukast, Duonebs PRN  -Titrate O2 for pO2 of 88-92%. Currently on her baseline of 2L  -Trilogy CPAP at night   -transition to oral steroids for discharge home - adjust insulin accordingly. -HFpEF  -NT pro-BNP: 39  -ECHO (1/19): EF 55-60%, normal left ventricular systolic function. Abnormal diastolic function.   Plan:  -Lasix 40 mg BID      Hypertension  -Home med: lisinopril   Plan:  Lisinopril 10 mg daily T2DM  -Home meds: 65 units nightly, 10 units in AM, and ssi 6-12 depending on home bg readings   -BG running high (>250), likely due to steroids  -Denies any hyperglycemic symptoms. Plan:  -Lantus increased to 65 units nightly, 10 in the AM   -Continue SSI for meals  -Hypoglycemia protocol     Code: DNR  Diet: Cardiac  DVT/AC: Lovenox 30 mg BID   Mobility: per protocol  Disposition: home     Point of Contact (relationship): Arlene Mccullough, daughter, [phone]        Pertinent Results:      CURRENT ADMISSION IMAGING RESULTS   XR CHEST PORT    Result Date: 1/18/2023  Bibasilar atelectasis, without evidence of acute cardiopulmonary process Dictated by Eileen Loera MD, PGY-2 As the attending radiologist, I have assessed the study images and dictated or reviewed/edited the final report as needed. Cardiology Procedures/Testing:  MODALITY RESULTS   EKG Results for orders placed or performed during the hospital encounter of 01/18/23   EKG, 12 LEAD, INITIAL   Result Value Ref Range    Ventricular Rate 80 BPM    Atrial Rate 80 BPM    P-R Interval 138 ms    QRS Duration 132 ms    Q-T Interval 408 ms    QTC Calculation (Bezet) 470 ms    Calculated P Axis 50 degrees    Calculated R Axis 79 degrees    Calculated T Axis 6 degrees    Diagnosis       Normal sinus rhythm  Right bundle branch block  Abnormal ECG  When compared with ECG of 10-DEC-2022 15:00,  No significant change was found  Confirmed by Irma Coronado ([id number]) on 1/18/2023 3:49:44 PM         ECHO 01/18/23    ECHO ADULT FOLLOW-UP OR LIMITED 01/19/2023 1/19/2023    Interpretation Summary    Technical qualifiers: Echo study was technically difficult with poor endocardial visualization. Contrast used: Definity. Left Ventricle: Normal left ventricular systolic function with a visually estimated EF of 55 - 60%. Left ventricle size is normal. Mildly increased wall thickness. Normal wall motion. Abnormal diastolic function.     Right Ventricle: Not assessed due to poor image quality. Signed by: Sridhar Lawson MD on 1/19/2023 10:14 AM     IR No results found for this or any previous visit. CATH 12/18/20    CARDIAC PROCEDURE 12/22/2020 12/22/2020    Conclusion  Elevated LVEDP. Normal LV function. Moderate 2 vessel coronary artery disease best treated medically  Continue intense risk factor modification. Signed by: Kelsey Bal MD on 12/22/2020  5:24 PM        Special Testing/Procedures:  MODALITY RESULTS   MICRO All Micro Results       Procedure Component Value Units Date/Time    RESPIRATORY VIRUS PANEL W/COVID-19, PCR [[id number]]  (Abnormal) Collected: 01/18/23 1507    Order Status: Completed Specimen: Nasopharyngeal Updated: 01/18/23 2143     Adenovirus Not detected        Coronavirus 229E Not detected        Coronavirus HKU1 Not detected        Coronavirus CVNL63 Not detected        Coronavirus OC43 Not detected        SARS-CoV-2, PCR Not detected        Metapneumovirus Not detected        Rhinovirus and Enterovirus Not detected        Influenza A Not detected        Influenza A, subtype H1 Not detected        Influenza A, subtype H3 Not detected        INFLUENZA A H1N1 PCR Not detected        Influenza B Not detected        Parainfluenza 1 Detected        Parainfluenza 2 Not detected        Parainfluenza 3 Not detected        Parainfluenza virus 4 Not detected        RSV by PCR Not detected        B. parapertussis, PCR Not detected        Bordetella pertussis - PCR Not detected        Chlamydophila pneumoniae DNA, QL, PCR Not detected        Mycoplasma pneumoniae DNA, QL, PCR Not detected              ABG Lab Results   Component Value Date/Time    pH (POC) 7.38 01/18/2023 07:33 PM    pCO2 (POC) 39.2 01/18/2023 07:33 PM    pO2 (POC) 105 (H) 01/18/2023 07:33 PM    HCO3 (POC) 23.3 01/18/2023 07:33 PM    FIO2 (POC) 40 12/10/2022 03:42 PM      UA No results found for this or any previous visit.       Laboratory Results:  LABORATORY RESULTS HEMATOLOGY Lab Results   Component Value Date/Time    WBC 14.0 (H) 01/21/2023 04:49 AM    HGB 12.8 01/21/2023 04:49 AM    HCT 37.7 01/21/2023 04:49 AM    PLATELET 250 86/53/5425 04:49 AM    MCV 87.7 01/21/2023 04:49 AM       CHEMISTRIES Lab Results   Component Value Date/Time    Sodium 136 01/22/2023 07:00 AM    Potassium 3.9 01/22/2023 07:00 AM    Chloride 101 01/22/2023 07:00 AM    CO2 27 01/22/2023 07:00 AM    Anion gap 8 01/22/2023 07:00 AM    Glucose 149 (H) 01/22/2023 07:00 AM    BUN 27 (H) 01/22/2023 07:00 AM    Creatinine 0.95 01/22/2023 07:00 AM    BUN/Creatinine ratio 28 (H) 01/22/2023 07:00 AM    GFR est AA >60 02/07/2022 03:45 PM    GFR est non-AA >60 02/07/2022 03:45 PM    Calcium 9.3 01/22/2023 07:00 AM      HEPATIC FUNCTION Lab Results   Component Value Date/Time    Albumin 3.3 (L) 01/22/2023 07:00 AM    Bilirubin, direct <0.1 02/08/2017 10:00 PM    Bilirubin, total 0.7 01/22/2023 07:00 AM    Protein, total 7.0 01/22/2023 07:00 AM    Globulin 3.7 01/22/2023 07:00 AM    A-G Ratio 0.9 01/22/2023 07:00 AM    ALT (SGPT) 28 01/22/2023 07:00 AM    Alk.  phosphatase 63 01/22/2023 07:00 AM       LACTIC ACID Lab Results   Component Value Date/Time    Lactic acid 1.8 08/12/2020 01:57 AM    Lactic acid 1.6 08/11/2020 04:33 AM    Lactic acid 4.8 (HH) 08/10/2020 03:26 AM      CARDIAC PANEL Lab Results   Component Value Date/Time     06/28/2021 10:40 AM    CK - MB <1.0 06/28/2021 10:40 AM    CK-MB Index  06/28/2021 10:40 AM     CALCULATION NOT PERFORMED WHEN RESULT IS BELOW LINEAR LIMIT    Troponin-I, QT <0.02 06/28/2021 10:40 AM      NT-proBNP Lab Results   Component Value Date/Time    NT pro-BNP 39 01/19/2023 03:35 AM    NT pro-BNP 27 01/18/2023 02:46 PM    NT pro- 12/10/2022 03:20 PM    NT pro- 02/07/2022 03:45 PM    NT pro-BNP 62 06/28/2021 10:40 AM      THYROID Lab Results   Component Value Date/Time    TSH 0.67 01/19/2023 08:40 AM            Readmission Risk Score: High Risk            41 Total Score    3 Has Seen PCP in Last 6 Months (Yes=3, No=0)    4 IP Visits Last 12 Months (1-3=4, 4=9, >4=11)    9 Pt. Coverage (Medicare=5 , Medicaid, or Self-Pay=4)    25 Charlson Comorbidity Score (Age + Comorbid Conditions)        Criteria that do not apply:    . Living with Significant Other. Assisted Living. LTAC. SNF.  or   Rehab    Patient Length of Stay (>5 days = 3)              Adalberto Saenz MD, PGY-1  [facility]  1/22/2023 12:48 PM

## 2023-01-22 NOTE — PROGRESS NOTES
Pt is alert and oriented and able to make needs known. No s/s of any pain or discomfort. Discharge instructions reviewed in full detail with the patient. Opportunity given for questions and answers provided. All belongings are with the patient. IV was removed and is intact. Pt was wheeled down in  the wheelchair. Pt is discharged in stable condition.

## 2023-01-22 NOTE — ROUTINE PROCESS
Report received from [facility]. Patient is awake, alert, no distress noted. Call bell in reach.

## 2023-01-22 NOTE — PROGRESS NOTES
Received pt in bed. No s/s of any pain or distress noted. Up ad sola. Drinking and voiding okay. No concerns at this time. Will continue to monitor.

## 2023-01-27 NOTE — PROGRESS NOTES
Physician Progress Note      PATIENT:               Gregory Dexter  CSN #:                  [id number]  :                       1960  ADMIT DATE:       2023 2:35 PM  [address] [place] DATE:        2023 3:50 PM  RESPONDING  PROVIDER #:        Alisia Estrada MD          QUERY TEXT:    Patient admitted with COPD exacerbation. Noted documentation of Acute on chronic hypoxic respiratory failure on 2023 by attending. Baseline oxygen use 2-3L at home. Pt did not receive more than 3L as inpatient. If possible, please document in progress notes and discharge summary if you are evaluating and /or treating any of the following: The medical record reflects the following:  Risk Factors: Per ED Provider - chronically on 2 L a minute nasal cannula, 3 L a minute when exerting herself, has 3 days of illness, started with a sore throat then on the second day difficulty breathing now coughing up green phlegm and very short of breath. Effort: Pulmonary effort is normal. She is on her baseline home O2. Clinical Indicators: Per ED triage - c/o of increasing SOB that started appx three days ago. Pt is O2 saturations are is 97% on 2L/NC (which pt is normally on) however, pt is unable to complete full sentences. Per  medicine - 2-3L of home O2 who comes to the ED with wheezing and exertional dyspnea typical of previous exacerbations. Found to be parainfluenza positive.   Treatment: change prednisone to steroids given wheezing, empiric antibiotics and bronchodilators    Thank you,  Nikki Hamilton RN, [email or web address]  .  Options provided:  -- Acute on chronic hypoxic respiratory failure confirmed and chronic hypoxic respiratory failure ruled out  -- Chronic hypoxic respiratory failure confirmed and Acute on chronic hypoxic respiratory failure ruled out  -- Other - I will add my own diagnosis  -- Disagree - Not applicable / Not valid  -- Disagree - Clinically unable to determine / Unknown  -- Refer to Clinical Documentation Reviewer    PROVIDER RESPONSE TEXT:    After study, Acute on chronic hypoxic respiratory failure confirmed and chronic hypoxic respiratory failure ruled out.     Query created by: Guicho White on 1/26/2023 8:56 AM      Electronically signed by:  Marisol New MD 1/27/2023 8:12 AM

## 2023-03-10 ENCOUNTER — HOSPITAL ENCOUNTER (OUTPATIENT)
Facility: HOSPITAL | Age: 63
Setting detail: INFUSION SERIES
End: 2023-03-10
Payer: MEDICARE

## 2023-03-10 ENCOUNTER — HOSPITAL ENCOUNTER (OUTPATIENT)
Dept: INFUSION THERAPY | Age: 63
End: 2023-03-10

## 2023-03-10 VITALS
DIASTOLIC BLOOD PRESSURE: 78 MMHG | HEART RATE: 91 BPM | OXYGEN SATURATION: 94 % | SYSTOLIC BLOOD PRESSURE: 177 MMHG | RESPIRATION RATE: 20 BRPM | TEMPERATURE: 97.5 F

## 2023-03-10 DIAGNOSIS — J45.51 SEVERE PERSISTENT ASTHMA WITH EXACERBATION: Primary | ICD-10-CM

## 2023-03-10 PROCEDURE — 6360000002 HC RX W HCPCS: Performed by: INTERNAL MEDICINE

## 2023-03-10 PROCEDURE — 96372 THER/PROPH/DIAG INJ SC/IM: CPT

## 2023-03-10 RX ORDER — ALBUTEROL SULFATE 90 UG/1
4 AEROSOL, METERED RESPIRATORY (INHALATION) PRN
Status: CANCELLED | OUTPATIENT
Start: 2023-05-05

## 2023-03-10 RX ORDER — EPINEPHRINE 1 MG/ML
0.3 INJECTION, SOLUTION, CONCENTRATE INTRAVENOUS PRN
Status: CANCELLED | OUTPATIENT
Start: 2023-05-05

## 2023-03-10 RX ORDER — ACETAMINOPHEN 325 MG/1
650 TABLET ORAL
Status: CANCELLED | OUTPATIENT
Start: 2023-05-05

## 2023-03-10 RX ORDER — DIPHENHYDRAMINE HYDROCHLORIDE 50 MG/ML
50 INJECTION INTRAMUSCULAR; INTRAVENOUS
Status: CANCELLED | OUTPATIENT
Start: 2023-05-05

## 2023-03-10 RX ORDER — ONDANSETRON 2 MG/ML
8 INJECTION INTRAMUSCULAR; INTRAVENOUS
Status: CANCELLED | OUTPATIENT
Start: 2023-05-05

## 2023-03-10 RX ORDER — SODIUM CHLORIDE 9 MG/ML
INJECTION, SOLUTION INTRAVENOUS CONTINUOUS
Status: CANCELLED | OUTPATIENT
Start: 2023-05-05

## 2023-03-10 RX ADMIN — BENRALIZUMAB 30 MG: 30 INJECTION, SOLUTION SUBCUTANEOUS at 14:19

## 2023-03-10 NOTE — PROGRESS NOTES
[facility] Progress Note    Date: March 10, 2023    Name: Shraddha Cavazos    MRN: [id number]         : 1960      Ms. Melly Roche was assessed and education was provided. Ms. Camrno Son vitals were reviewed and patient was observed for 5 minutes prior to treatment. Vitals:    03/10/23 1415   BP: (!) 177/78   Pulse: 91   Resp: 20   Temp: 97.5 °F (36.4 °C)   SpO2: 94%       FASENRA 30MG was administered subcutaneous in right arm. Band aide applied. Ms. Melly Roche tolerated well, and had no complaints. Patient armband removed and shredded. Ms. Melly Roche was discharged from [place] [address] in stable condition at 1425. She is to return on 23 at 1330 for her next [facility] appointment.     Clary Greenwood RN  March 10, 2023  2:47 PM

## 2023-03-16 ENCOUNTER — HOSPITAL ENCOUNTER (OUTPATIENT)
Facility: HOSPITAL | Age: 63
Discharge: HOME | End: 2023-03-16
Payer: MEDICARE

## 2023-03-16 DIAGNOSIS — R52 PAIN: ICD-10-CM

## 2023-03-16 PROCEDURE — 72110 X-RAY EXAM L-2 SPINE 4/>VWS: CPT

## 2023-04-13 NOTE — LETTER
7/2/20 Patient: Jaqueline Alvarez YOB: 1960 Date of Visit: 7/1/2020 Mandy Doll MD 
[address] [facility] [address] [facility] [address] VIA In Basket Nigel Grimaldo MD 
[address] [place] [address] [address] [facility] [address] VIA In Basket Dear MD Nigel Jones MD, Thank you for referring Ms. Thomas Krishnan to [address] [place] [address] for evaluation. My notes for this consultation are attached. If you have questions, please do not hesitate to call me. I look forward to following your patient along with you. Sincerely, Nafisa Jo MD/MPH Pulmonary, Critical Care Medicine [facility] Bilobed Flap Text: The defect edges were debeveled with a #15 scalpel blade.  Given the location of the defect and the proximity to free margins a bilobe flap was deemed most appropriate.  Using a sterile surgical marker, an appropriate bilobe flap drawn around the defect.    The area thus outlined was incised deep to adipose tissue with a #15 scalpel blade.  The skin margins were undermined to an appropriate distance in all directions utilizing iris scissors.

## 2023-05-04 RX ORDER — MONTELUKAST SODIUM 10 MG/1
TABLET ORAL
Qty: 90 TABLET | Refills: 1 | Status: SHIPPED | OUTPATIENT
Start: 2023-05-04

## 2023-05-05 ENCOUNTER — HOSPITAL ENCOUNTER (OUTPATIENT)
Facility: HOSPITAL | Age: 63
Setting detail: INFUSION SERIES
End: 2023-05-05
Payer: MEDICARE

## 2023-05-05 VITALS
OXYGEN SATURATION: 95 % | HEART RATE: 78 BPM | DIASTOLIC BLOOD PRESSURE: 75 MMHG | RESPIRATION RATE: 24 BRPM | SYSTOLIC BLOOD PRESSURE: 161 MMHG | TEMPERATURE: 97.6 F

## 2023-05-05 DIAGNOSIS — J45.51 SEVERE PERSISTENT ASTHMA WITH EXACERBATION: Primary | ICD-10-CM

## 2023-05-05 PROCEDURE — 6360000002 HC RX W HCPCS: Performed by: INTERNAL MEDICINE

## 2023-05-05 PROCEDURE — 96372 THER/PROPH/DIAG INJ SC/IM: CPT

## 2023-05-05 RX ORDER — FLUTICASONE PROPIONATE AND SALMETEROL XINAFOATE 230; 21 UG/1; UG/1
2 AEROSOL, METERED RESPIRATORY (INHALATION) 2 TIMES DAILY
COMMUNITY
Start: 2022-09-23 | End: 2023-07-25 | Stop reason: SDUPTHER

## 2023-05-05 RX ORDER — GUAIFENESIN 600 MG/1
1200 TABLET, EXTENDED RELEASE ORAL AS NEEDED
Qty: 120 TABLET | Refills: 11 | COMMUNITY
Start: 2022-11-08 | End: 2023-11-08

## 2023-05-05 RX ORDER — DIPHENHYDRAMINE HYDROCHLORIDE 50 MG/ML
50 INJECTION INTRAMUSCULAR; INTRAVENOUS
Status: CANCELLED | OUTPATIENT
Start: 2023-06-30

## 2023-05-05 RX ORDER — FLUTICASONE PROPIONATE 50 MCG
2 SPRAY, SUSPENSION (ML) NASAL DAILY
COMMUNITY
Start: 2010-02-02

## 2023-05-05 RX ORDER — LATANOPROSTENE BUNOD 0.24 MG/ML
1 SOLUTION/ DROPS OPHTHALMIC NIGHTLY
COMMUNITY
Start: 2023-03-29

## 2023-05-05 RX ORDER — ACETAMINOPHEN 500 MG
1000 TABLET ORAL EVERY 6 HOURS PRN
COMMUNITY

## 2023-05-05 RX ORDER — EPINEPHRINE 1 MG/ML
0.3 INJECTION, SOLUTION, CONCENTRATE INTRAVENOUS PRN
Status: CANCELLED | OUTPATIENT
Start: 2023-06-30

## 2023-05-05 RX ORDER — SIMETHICONE 125 MG
125 CAPSULE ORAL 4 TIMES DAILY PRN
COMMUNITY

## 2023-05-05 RX ORDER — LISINOPRIL 20 MG/1
20 TABLET ORAL DAILY
COMMUNITY
Start: 2021-10-07

## 2023-05-05 RX ORDER — SODIUM CHLORIDE 9 MG/ML
INJECTION, SOLUTION INTRAVENOUS CONTINUOUS
Status: CANCELLED | OUTPATIENT
Start: 2023-06-30

## 2023-05-05 RX ORDER — MULTIVITAMIN
1 CAPSULE ORAL DAILY
COMMUNITY

## 2023-05-05 RX ORDER — ONDANSETRON 2 MG/ML
8 INJECTION INTRAMUSCULAR; INTRAVENOUS
Status: CANCELLED | OUTPATIENT
Start: 2023-06-30

## 2023-05-05 RX ORDER — IPRATROPIUM BROMIDE AND ALBUTEROL SULFATE 2.5; .5 MG/3ML; MG/3ML
SOLUTION RESPIRATORY (INHALATION)
COMMUNITY
Start: 2023-04-17 | End: 2023-07-25

## 2023-05-05 RX ORDER — ALBUTEROL SULFATE 90 UG/1
1 AEROSOL, METERED RESPIRATORY (INHALATION) EVERY 4 HOURS PRN
COMMUNITY
Start: 2020-07-07

## 2023-05-05 RX ORDER — PREDNISONE 20 MG/1
TABLET ORAL
COMMUNITY
Start: 2023-03-10 | End: 2023-05-05

## 2023-05-05 RX ORDER — ASPIRIN 81 MG/1
81 TABLET ORAL DAILY
COMMUNITY

## 2023-05-05 RX ORDER — ALBUTEROL SULFATE 90 UG/1
4 AEROSOL, METERED RESPIRATORY (INHALATION) PRN
Status: CANCELLED | OUTPATIENT
Start: 2023-06-30

## 2023-05-05 RX ORDER — AZITHROMYCIN 250 MG/1
250 TABLET, FILM COATED ORAL
COMMUNITY
Start: 2022-06-28 | End: 2023-07-25 | Stop reason: SDUPTHER

## 2023-05-05 RX ORDER — FUROSEMIDE 40 MG/1
1 TABLET ORAL 2 TIMES DAILY
COMMUNITY
Start: 2021-08-30

## 2023-05-05 RX ORDER — BUDESONIDE 1 MG/2ML
1000 INHALANT ORAL DAILY
COMMUNITY
Start: 2022-09-23

## 2023-05-05 RX ORDER — ALBUTEROL SULFATE 90 UG/1
AEROSOL, METERED RESPIRATORY (INHALATION)
COMMUNITY
Start: 2023-04-06

## 2023-05-05 RX ORDER — MONTELUKAST SODIUM 10 MG/1
10 TABLET ORAL NIGHTLY
COMMUNITY
Start: 2021-08-31 | End: 2023-07-25

## 2023-05-05 RX ORDER — POLYETHYLENE GLYCOL 3350 17 G/17G
17 POWDER, FOR SOLUTION ORAL PRN
COMMUNITY

## 2023-05-05 RX ORDER — EPINEPHRINE 0.3 MG/.3ML
0.3 INJECTION SUBCUTANEOUS
COMMUNITY
Start: 2022-07-26

## 2023-05-05 RX ORDER — ALBUTEROL SULFATE 2.5 MG/3ML
2.5 SOLUTION RESPIRATORY (INHALATION) EVERY 4 HOURS PRN
COMMUNITY

## 2023-05-05 RX ORDER — PREDNISONE 10 MG/1
10 TABLET ORAL DAILY
COMMUNITY
End: 2023-07-25

## 2023-05-05 RX ORDER — BENRALIZUMAB 30 MG/ML
30 INJECTION, SOLUTION SUBCUTANEOUS ONCE
COMMUNITY

## 2023-05-05 RX ORDER — ACETAMINOPHEN 325 MG/1
650 TABLET ORAL
Status: CANCELLED | OUTPATIENT
Start: 2023-06-30

## 2023-05-05 RX ORDER — AZELASTINE 1 MG/ML
2 SPRAY, METERED NASAL PRN
COMMUNITY
Start: 2022-07-26

## 2023-05-05 RX ORDER — OMEPRAZOLE 20 MG/1
20 CAPSULE, DELAYED RELEASE ORAL
COMMUNITY
Start: 2023-07-25 | End: 2023-07-25

## 2023-05-05 RX ADMIN — BENRALIZUMAB 30 MG: 30 INJECTION, SOLUTION SUBCUTANEOUS at 13:55

## 2023-05-05 ASSESSMENT — PAIN - FUNCTIONAL ASSESSMENT: PAIN_FUNCTIONAL_ASSESSMENT: 0-10

## 2023-05-05 ASSESSMENT — PAIN DESCRIPTION - DESCRIPTORS: DESCRIPTORS: ACHING

## 2023-05-05 NOTE — PROGRESS NOTES
[facility] Progress Note    Date: May 5, 2023    Name: Kenny Moreno    MRN: [id number]         : 1960    Ms. Tam Terrazas arrived in the [facility] today at 1330, in stable condition, here for her FASENRA INJECTION (EVERY 8 WEEKS). She was assessed and education was provided. Ms. Nohemi Cartwright vitals were reviewed. Vitals:    23 1330   BP: (!) 161/75   Pulse: 78   Resp: 24   Temp: 97.6 °F (36.4 °C)   SpO2: 95%         Ms. Tam Terrazas stated that she was doing fairly well, and was tolerating the FASENRA injections well, and  she voiced no major complaints. Erica Fretj  Benralizumab (FASENRA) 30 mg, was administered SQ, in the back of her RIGHT ARM at 1355, per order and without incident. Ms. Tam Terrazas tolerated well and voiced no complaints. Ms. Tam Terrazas was discharged from [place] [address] in stable condition at 1400. She is to return in 8 weeks, on Friday, 23 at 1330, for her next appointment, for her next [facility] Injection.        Mustapha Hill RN  May 5, 2023  1:54 PM

## 2023-05-31 ENCOUNTER — TELEPHONE (OUTPATIENT)
Age: 63
End: 2023-05-31

## 2023-06-01 ENCOUNTER — APPOINTMENT (OUTPATIENT)
Facility: HOSPITAL | Age: 63
End: 2023-06-01
Payer: MEDICARE

## 2023-06-01 ENCOUNTER — HOSPITAL ENCOUNTER (EMERGENCY)
Facility: HOSPITAL | Age: 63
Discharge: HOME | End: 2023-06-02
Attending: EMERGENCY MEDICINE
Payer: MEDICARE

## 2023-06-01 DIAGNOSIS — R06.00 DYSPNEA, UNSPECIFIED TYPE: Primary | ICD-10-CM

## 2023-06-01 LAB
ALBUMIN SERPL-MCNC: 4 G/DL (ref 3.4–5)
ALBUMIN/GLOB SERPL: 1 (ref 0.8–1.7)
ALP SERPL-CCNC: 100 U/L (ref 45–117)
ALT SERPL-CCNC: 30 U/L (ref 13–56)
ANION GAP SERPL CALC-SCNC: 6 MMOL/L (ref 3–18)
AST SERPL-CCNC: 16 U/L (ref 10–38)
BASOPHILS # BLD: 0 K/UL (ref 0–0.1)
BASOPHILS NFR BLD: 0 % (ref 0–2)
BILIRUB SERPL-MCNC: 0.6 MG/DL (ref 0.2–1)
BUN SERPL-MCNC: 12 MG/DL (ref 7–18)
BUN/CREAT SERPL: 11 (ref 12–20)
CALCIUM SERPL-MCNC: 9.9 MG/DL (ref 8.5–10.1)
CHLORIDE SERPL-SCNC: 100 MMOL/L (ref 100–111)
CO2 SERPL-SCNC: 31 MMOL/L (ref 21–32)
CREAT SERPL-MCNC: 1.13 MG/DL (ref 0.6–1.3)
D DIMER PPP FEU-MCNC: 0.37 UG/ML(FEU)
DIFFERENTIAL METHOD BLD: ABNORMAL
EOSINOPHIL # BLD: 0 K/UL (ref 0–0.4)
EOSINOPHIL NFR BLD: 0 % (ref 0–5)
ERYTHROCYTE [DISTWIDTH] IN BLOOD BY AUTOMATED COUNT: 13.3 % (ref 11.6–14.5)
GLOBULIN SER CALC-MCNC: 4.1 G/DL (ref 2–4)
GLUCOSE SERPL-MCNC: 148 MG/DL (ref 74–99)
HCT VFR BLD AUTO: 41.5 % (ref 35–45)
HGB BLD-MCNC: 13.9 G/DL (ref 12–16)
IMM GRANULOCYTES # BLD AUTO: 0.1 K/UL (ref 0–0.04)
IMM GRANULOCYTES NFR BLD AUTO: 1 % (ref 0–0.5)
LYMPHOCYTES # BLD: 2.1 K/UL (ref 0.9–3.6)
LYMPHOCYTES NFR BLD: 30 % (ref 21–52)
MAGNESIUM SERPL-MCNC: 1.8 MG/DL (ref 1.6–2.6)
MCH RBC QN AUTO: 28.1 PG (ref 24–34)
MCHC RBC AUTO-ENTMCNC: 33.5 G/DL (ref 31–37)
MCV RBC AUTO: 83.8 FL (ref 78–100)
MONOCYTES # BLD: 0.5 K/UL (ref 0.05–1.2)
MONOCYTES NFR BLD: 7 % (ref 3–10)
NEUTS SEG # BLD: 4.3 K/UL (ref 1.8–8)
NEUTS SEG NFR BLD: 62 % (ref 40–73)
NRBC # BLD: 0 K/UL (ref 0–0.01)
NRBC BLD-RTO: 0 PER 100 WBC
NT PRO BNP: 167 PG/ML (ref 0–900)
PLATELET # BLD AUTO: 322 K/UL (ref 135–420)
PMV BLD AUTO: 8.5 FL (ref 9.2–11.8)
POTASSIUM SERPL-SCNC: 3.9 MMOL/L (ref 3.5–5.5)
PROT SERPL-MCNC: 8.1 G/DL (ref 6.4–8.2)
RBC # BLD AUTO: 4.95 M/UL (ref 4.2–5.3)
SODIUM SERPL-SCNC: 137 MMOL/L (ref 136–145)
TROPONIN I SERPL HS-MCNC: 14 NG/L (ref 0–54)
TROPONIN I SERPL HS-MCNC: 14 NG/L (ref 0–54)
WBC # BLD AUTO: 7 K/UL (ref 4.6–13.2)

## 2023-06-01 PROCEDURE — 96375 TX/PRO/DX INJ NEW DRUG ADDON: CPT

## 2023-06-01 PROCEDURE — 93005 ELECTROCARDIOGRAM TRACING: CPT | Performed by: EMERGENCY MEDICINE

## 2023-06-01 PROCEDURE — 80053 COMPREHEN METABOLIC PANEL: CPT

## 2023-06-01 PROCEDURE — 71045 X-RAY EXAM CHEST 1 VIEW: CPT

## 2023-06-01 PROCEDURE — 6360000002 HC RX W HCPCS: Performed by: EMERGENCY MEDICINE

## 2023-06-01 PROCEDURE — 85025 COMPLETE CBC W/AUTO DIFF WBC: CPT

## 2023-06-01 PROCEDURE — 83735 ASSAY OF MAGNESIUM: CPT

## 2023-06-01 PROCEDURE — 99285 EMERGENCY DEPT VISIT HI MDM: CPT

## 2023-06-01 PROCEDURE — 94640 AIRWAY INHALATION TREATMENT: CPT

## 2023-06-01 PROCEDURE — 96366 THER/PROPH/DIAG IV INF ADDON: CPT

## 2023-06-01 PROCEDURE — 84484 ASSAY OF TROPONIN QUANT: CPT

## 2023-06-01 PROCEDURE — 85379 FIBRIN DEGRADATION QUANT: CPT

## 2023-06-01 PROCEDURE — 83880 ASSAY OF NATRIURETIC PEPTIDE: CPT

## 2023-06-01 PROCEDURE — 96365 THER/PROPH/DIAG IV INF INIT: CPT

## 2023-06-01 PROCEDURE — 6370000000 HC RX 637 (ALT 250 FOR IP): Performed by: EMERGENCY MEDICINE

## 2023-06-01 RX ORDER — ACETAMINOPHEN 500 MG
1000 TABLET ORAL
Status: COMPLETED | OUTPATIENT
Start: 2023-06-01 | End: 2023-06-01

## 2023-06-01 RX ORDER — FUROSEMIDE 10 MG/ML
40 INJECTION INTRAMUSCULAR; INTRAVENOUS
Status: COMPLETED | OUTPATIENT
Start: 2023-06-01 | End: 2023-06-01

## 2023-06-01 RX ORDER — DEXAMETHASONE SODIUM PHOSPHATE 4 MG/ML
10 INJECTION, SOLUTION INTRA-ARTICULAR; INTRALESIONAL; INTRAMUSCULAR; INTRAVENOUS; SOFT TISSUE
Status: COMPLETED | OUTPATIENT
Start: 2023-06-01 | End: 2023-06-01

## 2023-06-01 RX ORDER — MAGNESIUM SULFATE IN WATER 40 MG/ML
2000 INJECTION, SOLUTION INTRAVENOUS
Status: COMPLETED | OUTPATIENT
Start: 2023-06-01 | End: 2023-06-02

## 2023-06-01 RX ORDER — ALBUTEROL SULFATE 2.5 MG/3ML
2.5 SOLUTION RESPIRATORY (INHALATION)
Status: COMPLETED | OUTPATIENT
Start: 2023-06-01 | End: 2023-06-01

## 2023-06-01 RX ORDER — IPRATROPIUM BROMIDE AND ALBUTEROL SULFATE 2.5; .5 MG/3ML; MG/3ML
1 SOLUTION RESPIRATORY (INHALATION)
Status: COMPLETED | OUTPATIENT
Start: 2023-06-01 | End: 2023-06-01

## 2023-06-01 RX ADMIN — ALBUTEROL SULFATE 2.5 MG: 2.5 SOLUTION RESPIRATORY (INHALATION) at 22:12

## 2023-06-01 RX ADMIN — IPRATROPIUM BROMIDE AND ALBUTEROL SULFATE 1 DOSE: 2.5; .5 SOLUTION RESPIRATORY (INHALATION) at 22:12

## 2023-06-01 RX ADMIN — DEXAMETHASONE SODIUM PHOSPHATE 10 MG: 4 INJECTION INTRA-ARTICULAR; INTRALESIONAL; INTRAMUSCULAR; INTRAVENOUS; SOFT TISSUE at 22:12

## 2023-06-01 RX ADMIN — MAGNESIUM SULFATE HEPTAHYDRATE 2000 MG: 40 INJECTION, SOLUTION INTRAVENOUS at 22:40

## 2023-06-01 RX ADMIN — FUROSEMIDE 40 MG: 10 INJECTION, SOLUTION INTRAMUSCULAR; INTRAVENOUS at 22:11

## 2023-06-01 RX ADMIN — ACETAMINOPHEN 1000 MG: 500 TABLET ORAL at 19:24

## 2023-06-01 ASSESSMENT — ENCOUNTER SYMPTOMS
CHEST TIGHTNESS: 1
SHORTNESS OF BREATH: 1
GASTROINTESTINAL NEGATIVE: 1

## 2023-06-01 ASSESSMENT — PAIN - FUNCTIONAL ASSESSMENT: PAIN_FUNCTIONAL_ASSESSMENT: NONE - DENIES PAIN

## 2023-06-02 VITALS
HEIGHT: 63 IN | SYSTOLIC BLOOD PRESSURE: 150 MMHG | TEMPERATURE: 98.4 F | DIASTOLIC BLOOD PRESSURE: 80 MMHG | BODY MASS INDEX: 46.07 KG/M2 | RESPIRATION RATE: 19 BRPM | WEIGHT: 260 LBS | HEART RATE: 71 BPM | OXYGEN SATURATION: 92 %

## 2023-06-02 NOTE — DISCHARGE INSTRUCTIONS
Come back if you get worse. Follow-up without fail. Should you go to a pulmonology appointment tomorrow.

## 2023-06-02 NOTE — ED PROVIDER NOTES
[facility] EMERGENCY DEPT  EMERGENCY DEPARTMENT ENCOUNTER      Pt Name: Dedrick Price  MRN: [id number]  [place] 1960  Date of evaluation: 6/1/2023  Provider: Carolee Watters MD    CHIEF COMPLAINT       Chief Complaint   Patient presents with    Chest Pain    Shortness of Breath         HISTORY OF PRESENT ILLNESS   (Location/Symptom, Timing/Onset, Context/Setting, Quality, Duration, Modifying Factors, Severity)  Note limiting factors. Dedrick Price is a 60 y.o. female who presents to the emergency department     76-year-old female with multiple medical issues including COPD asthma congestive heart failure hypertension diabetes presents the emergency department with 2 days of shortness of breath. Patient states she has right-sided cramping sensation in her rib cage. Denies trauma. No cough no leg swelling. Patient states he takes medication as prescribed. She feels her body is tight. She is on 2 L chronically. Patient last had a cigarette 15 years ago. She has an appointment with pulmonology KANDACE Key tomorrow. The history is provided by the patient. No  was used. Nursing Notes were reviewed. REVIEW OF SYSTEMS    (2-9 systems for level 4, 10 or more for level 5)     Review of Systems   Constitutional: Negative. HENT: Negative. Respiratory:  Positive for chest tightness and shortness of breath. Cardiovascular:  Positive for chest pain. Gastrointestinal: Negative. Genitourinary: Negative. Neurological: Negative. Hematological: Negative. All other systems reviewed and are negative. Except as noted above the remainder of the review of systems was reviewed and negative.        PAST MEDICAL HISTORY     Past Medical History:   Diagnosis Date    Asthma     Chronic lung disease     Cystocele, midline     Diabetes mellitus (HCC)     GERD (gastroesophageal reflux disease)     Hidradenitis suppurativa     Hyperlipidemia     Hypertension     MARVIN

## 2023-06-03 LAB
EKG ATRIAL RATE: 72 BPM
EKG DIAGNOSIS: NORMAL
EKG P AXIS: 49 DEGREES
EKG P-R INTERVAL: 164 MS
EKG Q-T INTERVAL: 442 MS
EKG QRS DURATION: 134 MS
EKG QTC CALCULATION (BAZETT): 483 MS
EKG R AXIS: 51 DEGREES
EKG T AXIS: 6 DEGREES
EKG VENTRICULAR RATE: 72 BPM

## 2023-06-03 PROCEDURE — 93010 ELECTROCARDIOGRAM REPORT: CPT | Performed by: INTERNAL MEDICINE

## 2023-06-28 ENCOUNTER — TELEPHONE (OUTPATIENT)
Age: 63
End: 2023-06-28

## 2023-06-28 NOTE — TELEPHONE ENCOUNTER
Betty Clemens order needs to be signed and faxed to [phone] Apt Friday 6/30/23. Dr Abby Mercedes in office and will be informed.

## 2023-06-30 ENCOUNTER — HOSPITAL ENCOUNTER (OUTPATIENT)
Facility: HOSPITAL | Age: 63
Setting detail: INFUSION SERIES
End: 2023-06-30
Payer: MEDICARE

## 2023-06-30 VITALS
RESPIRATION RATE: 20 BRPM | TEMPERATURE: 97.8 F | SYSTOLIC BLOOD PRESSURE: 158 MMHG | OXYGEN SATURATION: 93 % | HEART RATE: 74 BPM | DIASTOLIC BLOOD PRESSURE: 84 MMHG

## 2023-06-30 DIAGNOSIS — J45.51 SEVERE PERSISTENT ASTHMA WITH EXACERBATION: Primary | ICD-10-CM

## 2023-06-30 PROCEDURE — 6360000002 HC RX W HCPCS: Performed by: INTERNAL MEDICINE

## 2023-06-30 PROCEDURE — 96372 THER/PROPH/DIAG INJ SC/IM: CPT

## 2023-06-30 RX ORDER — SODIUM CHLORIDE 9 MG/ML
INJECTION, SOLUTION INTRAVENOUS CONTINUOUS
Status: CANCELLED | OUTPATIENT
Start: 2023-08-25

## 2023-06-30 RX ORDER — ALBUTEROL SULFATE 90 UG/1
4 AEROSOL, METERED RESPIRATORY (INHALATION) PRN
Status: CANCELLED | OUTPATIENT
Start: 2023-08-25

## 2023-06-30 RX ORDER — EPINEPHRINE 1 MG/ML
0.3 INJECTION, SOLUTION, CONCENTRATE INTRAVENOUS PRN
Status: CANCELLED | OUTPATIENT
Start: 2023-08-25

## 2023-06-30 RX ORDER — ONDANSETRON 2 MG/ML
8 INJECTION INTRAMUSCULAR; INTRAVENOUS
Status: CANCELLED | OUTPATIENT
Start: 2023-08-25

## 2023-06-30 RX ORDER — DIPHENHYDRAMINE HYDROCHLORIDE 50 MG/ML
50 INJECTION INTRAMUSCULAR; INTRAVENOUS
Status: CANCELLED | OUTPATIENT
Start: 2023-08-25

## 2023-06-30 RX ORDER — ACETAMINOPHEN 325 MG/1
650 TABLET ORAL
Status: CANCELLED | OUTPATIENT
Start: 2023-08-25

## 2023-06-30 RX ADMIN — BENRALIZUMAB 30 MG: 30 INJECTION, SOLUTION SUBCUTANEOUS at 13:43

## 2023-06-30 ASSESSMENT — PAIN DESCRIPTION - ORIENTATION: ORIENTATION: RIGHT;LEFT

## 2023-06-30 ASSESSMENT — PAIN DESCRIPTION - LOCATION: LOCATION: FOOT;LEG

## 2023-06-30 ASSESSMENT — PAIN SCALES - GENERAL: PAINLEVEL_OUTOF10: 6

## 2023-07-05 NOTE — CONSULTS
[address] [place] [address] [facility]  Pulmonary, Critical Care, and Sleep Medicine    Name: Edna Mendes MRN: [id number]   : 1960 Hospital: [address] [place] [address] Dr   Date: 2018        Pulmonary Follow-up In-Patient Consult                                              Consult requesting physician: Dr. Peter Kyle  Reason for Consult: COPD exacerbation    IMPRESSION:   · Severe COPD with significant asthma overlap now with acute exacerbation failed outpatient therapy with oral steroids. No evidence for infection and has completed course of appropriate antibiotics. · History of asthma  · GOLD stage 3D COPD (high symptoms and increased frequency of exacerbations) with home O2 at 2L/min. Last christiano in 2016 showed FEV1 (post BD) 51% with signficant BD response. · Mild SHERRIE with AHI = 12 currently prescribed CPAP 9 cm H20 with EPR = 2  · Obesity  · DM  · GERD  · HTN      RECOMMENDATIONS:   · Will decrease IV solumedrol to 60 mg bid given dramatic improvement with plan to transition to oral in next 1-2 days depending on ongoing clinical course. · Continue scheduled duonebs in lieu of long acting anticholinergic   · Start nebulized LABA - brovana and nebulized steroids: pulmicort. · Continue BIPAP qhs to improve patient tolerance and help with work of breathing. Advise BIPAP 14/9 cm H20   · Continue supplemental O2 to 2 L/min   · Nutrition: per primary team  · Replace electrolytes  · HOB >=30 degree, aggressive pulmonary toileting, incentive spirometry, PT/OT eval and treat  · GI Prophylaxis: per primary team: continue outpatient PPI  · DVT Prophylaxis: per primary team :on lovenox  · Further recommendations will be based on the patient's response to recommended treatment and results of the investigation ordered.      Subjective/History:     Edna Mendes is a 61 y.o. female with PMHx significant for COPD/asthma overlap, mild SHERRIE on CPAP therapy, obesity who has been admitted to [facility] for failed outpatient therapy for COPD exacerbation. Patient has moderate to severe COPD with frequent exacerbations and a significant asthma component as she had dramatic bronchodilator response with her last PFTs who initially presented 28JUN this year to ED with symptoms consistent with exacerbation and was sent home with 3 day course of prednisone, course of abx and follow-up plan with her pulmonologist, Dr. Genevieve Brooks. Her pulmonary appointment was cancelled because of provider sickness and [place] [facility] [place]one and instructed her to use duoneb every 6 hours. She continued to struggle with severe chest tightness and dyspnea prompting ED presentation last night. Denies fever, chills, night sweats. Not using CPAP recently due to difficulty breathing. 7/8/2018:  Patient reports much improvement in her dyspnea and slept well on BIPAP settings. Improved air movement on lung exam.    Allergies   Allergen Reactions    Ancef [Cefazolin] Hives    Contrast Agent [Iodine] Anaphylaxis, Shortness of Breath and Swelling     Needs pre-medication for IV contrast with Benadryl, Solu-Medrol    Fish Containing Products Anaphylaxis     Pt states she had a severe allergic reaction at 9 y/o.  Metformin Other (comments)     Abdominal pain, diarrhea.     Codeine Other (comments)     Altered mental status        Past Medical History:   Diagnosis Date    Asthma     COPD     Cystocele, midline     Diabetes mellitus ([address]     GERD (gastroesophageal reflux disease)     Hidradenitis suppurativa     Hyperlipidemia     Hypertension     SHERRIE on CPAP     CPAP    Stress incontinence         Past Surgical History:   Procedure Laterality Date    BREAST SURGERY PROCEDURE UNLISTED      Right breast benign tumor removal    HX APPENDECTOMY      HX CHOLECYSTECTOMY      HX DILATION AND CURETTAGE      Dysfunctional uterine bleeding, thought 2/2 fibroids    HX TUBAL LIGATION          Family History Problem Relation Age of Onset    Hypertension Mother     Stroke Mother     Breast Cancer Mother      Bilateral mastectomies    Cancer Mother      ovarian and breast    Heart Failure Mother     Heart Attack Father      2011    Heart Surgery Father      CABG    Heart Failure Father     COPD Sister      Heavy smoker    Cancer Sister      ovarian    Heart Failure Sister     Lung Disease Sister     Asthma Child     Cancer Maternal Aunt      pancreatic    Cancer Maternal Grandfather      stomach        Social History   Substance Use Topics    Smoking status: Former Smoker     Packs/day: 1.00     Years: 30.00     Types: Cigarettes     Start date: 5/6/1966     Quit date: 9/6/2006    Smokeless tobacco: Never Used      Comment: 1-1.5 packs per day    Alcohol use No        Prior to Admission medications    Medication Sig Start Date End Date Taking? Authorizing Provider   insulin NPH/insulin regular (NOVOLIN 70/30 U-100 INSULIN) 100 unit/mL (70-30) injection by SubCUTAneous route. 8 units if blood sugar is 200-300 over 300 12 units   Yes Historical Provider   insulin glargine (LANTUS,BASAGLAR) 100 unit/mL (3 mL) inpn 28 Units by SubCUTAneous route nightly. Indications: type 2 diabetes mellitus  Patient taking differently: 25 Units by SubCUTAneous route nightly. Indications: type 2 diabetes mellitus 3/15/18  Yes Margarita Landon MD   umeclidinium (INCRUSE ELLIPTA) 62.5 mcg/actuation inhaler Take 1 Puff by inhalation daily. Indications: BRONCHOSPASM PREVENTION WITH COPD 3/15/18  Yes Margarita Landon MD   Diabetic Supplies, [place] misc Diabetic needles 1/2 inch 31 gauge - 1 injection daily 3/15/18  Yes Margarita Landon MD   Diabetic Supplies, [place] misc Diabetic test strips - use four times daily 3/15/18  Yes Margarita Landon MD   Diabetic Supplies, [place]  misc Diabetic lancets - use three times daily 3/15/18  Yes Margarita Landon MD   ipratropium (ATROVENT) 0.02 % soln 2.5 mL by Nebulization route four (4) times daily as needed. Indications: BRONCHOSPASM PREVENTION WITH COPD 3/15/18  Yes Misael Castro MD   albuterol (PROVENTIL VENTOLIN) 2.5 mg /3 mL (0.083 %) nebulizer solution 3 mL by Nebulization route every four (4) hours as needed for Wheezing. Indications: BRONCHOSPASM PREVENTION, Chronic Obstructive Pulmonary Disease 3/15/18  Yes Misael Castro MD   omeprazole (PRILOSEC) 40 mg capsule Take 40 mg by mouth daily. Indications: gastroesophageal reflux disease   Yes Historical Provider   lisinopril (PRINIVIL, ZESTRIL) 40 mg tablet Take 20 mg by mouth two (2) times a day. Indications: hypertension   Yes Historical Provider   simethicone (MYLICON) 80 mg chewable tablet Take 80 mg by mouth every six (6) hours as needed for Flatulence. Yes Historical Provider   cpap machine kit by Does Not Apply route nightly. Yes Historical Provider   OXYGEN-AIR DELIVERY SYSTEMS 2 L by Nasal route continuous. First Choice   Yes Historical Provider   fluticasone-salmeterol (ADVAIR HFA) 173-40 mcg/actuation inhaler Take 2 Puffs by inhalation two (2) times a day. Yes Historical Provider   Diabetic [place] misc Diabetic syringes 1 mL - use one daily 2/14/17  Yes Cady Daly, DO   albuterol (PROVENTIL HFA, VENTOLIN HFA, PROAIR HFA) 90 mcg/actuation inhaler Take 2 Puffs by inhalation every four (4) hours as needed for Wheezing. For the next 2 days after hospital discharge, use your inhaler 4 times a day. 3/9/16  Yes Lanette Al MD   Inhalational Spacing Device (AEROCHAMBER) 1 Each by Does Not Apply route as needed for Cough or Other (COPD exacerbation). 10/2/12  Yes Danitza South, DO   fluticasone (FLONASE) 50 mcg/actuation nasal spray 2 Sprays by Both Nostrils route daily. Yes Historical Provider   montelukast (SINGULAIR) 10 mg tablet Take 10 mg by mouth nightly. Yes Lauren Allison MD   aspirin delayed-release 81 mg tablet Take 81 mg by mouth daily.     Historical Provider   famotidine (PEPCID) 20 mg tablet Take 20 mg by mouth two (2) times daily as needed.     Historical Provider       Current Facility-Administered Medications   Medication Dose Route Frequency    insulin glargine (LANTUS) injection 28 Units  28 Units SubCUTAneous QHS    acetaminophen (TYLENOL) tablet 650 mg  650 mg Oral Q4H PRN    enoxaparin (LOVENOX) injection 40 mg  40 mg SubCUTAneous Q24H    montelukast (SINGULAIR) tablet 10 mg  10 mg Oral QHS    fluticasone (FLONASE) 50 mcg/actuation nasal spray 2 Spray  2 Spray Both Nostrils DAILY    aspirin delayed-release tablet 81 mg  81 mg Oral DAILY    lisinopril (PRINIVIL, ZESTRIL) tablet 20 mg  20 mg Oral BID    simethicone (MYLICON) tablet 80 mg  80 mg Oral Q6H PRN    pantoprazole (PROTONIX) tablet 40 mg  40 mg Oral ACB    insulin lispro (HUMALOG) injection   SubCUTAneous AC&HS    glucose chewable tablet 16 g  4 Tab Oral PRN    glucagon (GLUCAGEN) injection 1 mg  1 mg IntraMUSCular PRN    dextrose (D50W) injection syrg 12.5-25 g  25-50 mL IntraVENous PRN    albuterol (PROVENTIL VENTOLIN) nebulizer solution 2.5 mg  2.5 mg Nebulization Q4H PRN    albuterol-ipratropium (DUO-NEB) 2.5 MG-0.5 MG/3 ML  3 mL Nebulization Q4H RT    methylPREDNISolone (PF) (SOLU-MEDROL) injection 125 mg  125 mg IntraVENous Q12H    arformoterol (BROVANA) neb solution 15 mcg  15 mcg Nebulization BID RT    budesonide (PULMICORT) 500 mcg/2 ml nebulizer suspension  500 mcg Nebulization BID RT         Objective:   Vital Signs:    Visit Vitals    /61 (BP 1 Location: Left arm, BP Patient Position: At rest)    Pulse 75    Temp 97.2 °F (36.2 °C)    Resp 20    Ht 5' 3\" (1.6 m)    Wt 115.2 kg (254 lb)    SpO2 97%    BMI 44.99 kg/m2       O2 Device: BIPAP   O2 Flow Rate (L/min): 2 l/min   Temp (24hrs), Av.3 °F (36.3 °C), Min:95.9 °F (35.5 °C), Max:98.2 °F (36.8 °C)       Intake/Output:   Last shift:         Last 3 shifts:  190 -  0700  In: 1350 [P.O.:1350]  Out: 1300 [Urine:900]    Intake/Output Summary (Last 24 hours) at 07/08/18 0845  Last data filed at 07/07/18 1801   Gross per 24 hour   Intake             1240 ml   Output              800 ml   Net              440 ml       Physical Exam:     General:  Alert, Awake, NAD, pleasant. Mild to moderate breathlessness with speaking. Head:   Normocephalic, without obvious abnormality, atraumatic. Eye:   Conjunctivae/corneas clear. PERRLA, no scleral icterus, no pallor, no cyanosis  Nose:   Nares normal. Septum midline. Mucosa normal without erythema/exudate. No drainage/discharge. No sinus tenderness. Throat:  Lips, mucosa, and tongue normal. OP: MP 3-4  Neck:   Supple, symmetric, thyroid: no enlargement/tenderness/nodule, no JVD, no carotid bruit, no lymphadenopathy. Trachea midline  Back & spine: Symmetric, no curvature. Chest wall: No tenderness or deformity. No rash  Lung:   Diminished but much improved air movement with end-expiratory wheezes. Heart:   Regular rate & rhythm. S1 S2 present, no murmur, no gallop, no click, no rub  Abdomen:  Soft, NT, ND, +BS, no masses, no organomegaly  Extremities:  No pedal edema, no cyanosis, no clubbing  Pulses: 2+ and symmetric in DP  Lymphatic:  No cervical, supraclavicular and axillary palpable lymphadenopathy. Musculoskeletal: No joint swelling or tenderness. Neurologic:  Grossly non focal.        Data:         Chemistry Recent Labs      07/08/18   0220  07/07/18   0515  07/06/18   2330   GLU  303*  325*  212*   NA  139  137  141   K  4.5  4.5  3.7   CL  104  102  104   CO2  25  26  30   BUN  17  12  10   CREA  1.13  1.05  0.99   CA  9.0  9.1  9.2   AGAP  10  9  7   BUCR  15  11*  10*        Lactic Acid Lactic acid   Date Value Ref Range Status   02/10/2017 1.9 0.4 - 2.0 MMOL/L Final     No results for input(s): LAC in the last 72 hours.      Liver Enzymes Protein, total   Date Value Ref Range Status   06/02/2018 7.6 6.4 - 8.2 g/dL Final     Albumin   Date Value Ref Range Status 06/02/2018 3.7 3.4 - 5.0 g/dL Final     Globulin   Date Value Ref Range Status   06/02/2018 3.9 2.0 - 4.0 g/dL Final     A-G Ratio   Date Value Ref Range Status   06/02/2018 0.9 0.8 - 1.7   Final     AST (SGOT)   Date Value Ref Range Status   06/02/2018 23 15 - 37 U/L Final     Alk. phosphatase   Date Value Ref Range Status   06/02/2018 86 45 - 117 U/L Final     No results for input(s): TP, ALB, GLOB, AGRAT, SGOT, GPT, AP, TBIL in the last 72 hours. No lab exists for component: DBIL     CBC w/Diff Recent Labs      07/08/18   0220  07/07/18   0515  07/06/18   2330   WBC  10.2  5.6  5.7   RBC  4.00*  4.39  4.42   HGB  11.9*  13.1  13.2   HCT  35.4  38.4  38.4   PLT  249  255  265   GRANS  92*  85*  51   LYMPH  6*  14*  40   EOS  0  0  2        Cardiac Enzymes No results found for: CPK, CK, CKMMB, CKMB, RCK3, CKMBT, CKNDX, CKND1, KESHIA, TROPT, TROIQ, JOEL, TROPT, TNIPOC, BNP, BNPP     BNP No results found for: BNP, BNPP, XBNPT     Coagulation No results for input(s): PTP, INR, APTT in the last 72 hours.     No lab exists for component: INREXT, INREXT      Thyroid  Lab Results   Component Value Date/Time    TSH 2.76 09/18/2016 02:20 PM          Lipid Panel Lab Results   Component Value Date/Time    Cholesterol, total 220 (H) 11/20/2012 03:22 AM    HDL Cholesterol 62 (H) 11/20/2012 03:22 AM    LDL, calculated 144.6 (H) 11/20/2012 03:22 AM    VLDL, calculated 13.4 11/20/2012 03:22 AM    Triglyceride 67 11/20/2012 03:22 AM    CHOL/HDL Ratio 3.5 11/20/2012 03:22 AM        ABG Recent Labs      07/07/18   0216   PHI  7.452*   PCO2I  46.6*   PO2I  110*   HCO3I  32.5*   FIO2I  0.28        Urinalysis Lab Results   Component Value Date/Time    Color YELLOW 02/08/2017 03:30 PM    Appearance CLEAR 02/08/2017 03:30 PM    Specific gravity 1.026 02/08/2017 03:30 PM    pH (UA) 5.0 02/08/2017 03:30 PM    Protein NEGATIVE  02/08/2017 03:30 PM    Glucose >1000 (A) 02/08/2017 03:30 PM    Ketone TRACE (A) 02/08/2017 03:30 PM    Bilirubin NEGATIVE  02/08/2017 03:30 PM    Urobilinogen 0.2 02/08/2017 03:30 PM    Nitrites NEGATIVE  02/08/2017 03:30 PM    Leukocyte Esterase NEGATIVE  02/08/2017 03:30 PM    Epithelial cells FEW 11/19/2012 02:01 PM    Bacteria FEW (A) 02/29/2012 04:26 AM    WBC 0 to 1 11/19/2012 02:01 PM    RBC NONE 11/19/2012 02:01 PM        Micro  No results for input(s): SDES, CULT in the last 72 hours. No results for input(s): CULT in the last 72 hours. XR (Most Recent). CXR reviewed by me and compared with previous CXR   Results from Hospital Encounter encounter on 07/06/18   XR CHEST PA LAT   Narrative Examination: PA and lateral chest     History: Shortness of breath    Comparison: June 28, 2018    Findings: There is stable linear scarring in the right lung base. There is no  pleural effusion, pulmonary edema, or pneumothorax. No definite focal  consolidation is seen. Cardiomegaly is stable. Mild atherosclerosis of aortic  arch. Degenerative changes of the spine. Impression Impression:  1. No acute process. Scarring in the right lung base. CT (Most Recent)   Results from Hospital Encounter encounter on 02/21/18   CT CHEST WO CONT   Narrative CT CHEST WITHOUT ENHANCEMENT    INDICATION: Shortness of breath, COPD, smoking history, history of lung nodule  undergoing follow-up. TECHNIQUE: Axial images obtained from the thoracic inlet to the level of the  diaphragm without intravenous contrast. Coronal and sagittal reformatted images  were obtained. All CT scans at this facility are performed using dose optimization technique as  appropriate to a performed exam, to include automated exposure control,  adjustment of the mA and/or kV according to patient size (including appropriate  matching first site-specific examinations), or use of iterative reconstruction  technique. COMPARISON: 9/5/2013.     CHEST FINDINGS:   The evaluation of the mediastinum, hilum and vascular structures is limited in  the absence of intravenous contrast.      Thyroid: Unremarkable in its visualized aspects. Pericardium/ Heart: Heart size is normal. No pericardial effusion. Moderate LAD  coronary arteriosclerosis. Aorta/ Vessels: No aneurysm. Mild atherosclerosis. Lymph Nodes: Unremarkable. .    Lungs: Trachea and central bronchial tree are patent. Mild centrilobular  emphysema and central bronchial wall thickening. Similar mild scarring in the  right middle lobe. Pleura: No effusion. Upper Abdomen: There is moderate hepatic steatosis throughout. Suspect  hepatomegaly but the caudal liver is not included in the field-of-view. More  significant abdominal atherosclerosis with coarse calcification at the limited  visualized ostia. .    Bones/soft tissues: Degenerative disc disease. Impression IMPRESSION:    Mild emphysema and bronchitis. Moderate LAD coronary arteriosclerosis. Moderate hepatic steatosis and possible hepatomegaly but caudal liver is not  included in the field-of-view. EKG No results found for this or any previous visit. ECHO No results found for this or any previous visit. PFT No flowsheet data found.      Other ASA reactivity:   Pre-albumin:   Ionized Calcium:   NH4:   T3, FT4:  Cortisol:  Urine Osm:  Urine Lytes:   HbA1c:      Recent Results (from the past 24 hour(s))   ECHO ADULT COMPLETE    Collection Time: 07/07/18 11:28 AM   Result Value Ref Range    LVIDd 4.06 3.9 - 5.3 cm    LVPWd 1.13 (A) 0.6 - 0.9 cm    LVIDs 2.48 cm    IVSd 1.06 (A) 0.6 - 0.9 cm    LVOT d 1.88 cm    Left Ventricle Isovolumic Relaxation Time 72.8 ms    LV E' Lateral Velocity 8.66 cm/s    MV A Moe 126.16 cm/s    MV E Moe 0.86 cm/s    MV E/A 0.7     LA Vol 4C 36.92 22 - 52 mL    LA Area 4C 14.9 cm2    LV Mass .5 (A) 67 - 162 g    LV Mass AL Index 79.7 g/m2    E/E' lateral 9.9     Mitral Valve E Wave Deceleration Time 162.0 ms    LA Vol Index 17.25 ml/m2   GLUCOSE, POC    Collection Time: 07/07/18 11:40 AM   Result Value Ref Range    Glucose (POC) 375 (H) 70 - 110 mg/dL   GLUCOSE, POC    Collection Time: 07/07/18  3:57 PM   Result Value Ref Range    Glucose (POC) 315 (H) 70 - 110 mg/dL   GLUCOSE, POC    Collection Time: 07/07/18  9:36 PM   Result Value Ref Range    Glucose (POC) 340 (H) 70 - 110 mg/dL   CBC WITH AUTOMATED DIFF    Collection Time: 07/08/18  2:20 AM   Result Value Ref Range    WBC 10.2 4.6 - 13.2 K/uL    RBC 4.00 (L) 4.20 - 5.30 M/uL    HGB 11.9 (L) 12.0 - 16.0 g/dL    HCT 35.4 35.0 - 45.0 %    MCV 88.5 74.0 - 97.0 FL    MCH 29.8 24.0 - 34.0 PG    MCHC 33.6 31.0 - 37.0 g/dL    RDW 13.5 11.6 - 14.5 %    PLATELET 405 750 - 950 K/uL    MPV 9.2 9.2 - 11.8 FL    NEUTROPHILS 92 (H) 40 - 73 %    LYMPHOCYTES 6 (L) 21 - 52 %    MONOCYTES 2 (L) 3 - 10 %    EOSINOPHILS 0 0 - 5 %    BASOPHILS 0 0 - 2 %    ABS. NEUTROPHILS 9.5 (H) 1.8 - 8.0 K/UL    ABS. LYMPHOCYTES 0.6 (L) 0.9 - 3.6 K/UL    ABS. MONOCYTES 0.2 0.05 - 1.2 K/UL    ABS. EOSINOPHILS 0.0 0.0 - 0.4 K/UL    ABS.  BASOPHILS 0.0 0.0 - 0.06 K/UL    DF AUTOMATED     METABOLIC PANEL, BASIC    Collection Time: 07/08/18  2:20 AM   Result Value Ref Range    Sodium 139 136 - 145 mmol/L    Potassium 4.5 3.5 - 5.5 mmol/L    Chloride 104 100 - 108 mmol/L    CO2 25 21 - 32 mmol/L    Anion gap 10 3.0 - 18 mmol/L    Glucose 303 (H) 74 - 99 mg/dL    BUN 17 7.0 - 18 MG/DL    Creatinine 1.13 0.6 - 1.3 MG/DL    BUN/Creatinine ratio 15 12 - 20      GFR est AA 60 (L) >60 ml/min/1.73m2    GFR est non-AA 49 (L) >60 ml/min/1.73m2    Calcium 9.0 8.5 - 10.1 MG/DL   GLUCOSE, POC    Collection Time: 07/08/18  7:48 AM   Result Value Ref Range    Glucose (POC) 313 (H) 70 - 110 mg/dL         Telemetry    The patient is: [x] acutely ill Risk of deterioration: [x] moderate    [] critically ill  [] high     [x]See my orders for details    My assessment, plan of care, findings, medications, side effects etc were discussed with:  [x] Nurse [] PT/OT    [] Respiratory therapy [x]     [] Family: answered all questions to satisfaction [x] Patient: answered all questions to satisfaction   [] Pharmacist []      [] Total critical care time exclusive of procedures 30 minutes with complex decision making, coordination of care and counseling patient performed and > 50% time spent in face to face evaluation.       Noy Tejada MD  7/8/2018 05-Jul-2023 07:39

## 2023-07-21 ENCOUNTER — NURSE ONLY (OUTPATIENT)
Age: 63
End: 2023-07-21

## 2023-07-21 VITALS
SYSTOLIC BLOOD PRESSURE: 168 MMHG | RESPIRATION RATE: 22 BRPM | OXYGEN SATURATION: 95 % | HEART RATE: 66 BPM | HEIGHT: 63 IN | DIASTOLIC BLOOD PRESSURE: 75 MMHG | BODY MASS INDEX: 45.89 KG/M2 | WEIGHT: 259 LBS

## 2023-07-21 DIAGNOSIS — J44.9 CHRONIC OBSTRUCTIVE PULMONARY DISEASE, UNSPECIFIED COPD TYPE (HCC): Primary | ICD-10-CM

## 2023-07-25 ENCOUNTER — OFFICE VISIT (OUTPATIENT)
Age: 63
End: 2023-07-25
Payer: MEDICARE

## 2023-07-25 VITALS
SYSTOLIC BLOOD PRESSURE: 159 MMHG | OXYGEN SATURATION: 94 % | RESPIRATION RATE: 16 BRPM | WEIGHT: 254.8 LBS | HEIGHT: 63 IN | HEART RATE: 73 BPM | TEMPERATURE: 99.1 F | DIASTOLIC BLOOD PRESSURE: 68 MMHG | BODY MASS INDEX: 45.15 KG/M2

## 2023-07-25 DIAGNOSIS — E66.01 MORBID OBESITY (HCC): ICD-10-CM

## 2023-07-25 DIAGNOSIS — J96.11 CHRONIC RESPIRATORY FAILURE WITH HYPOXIA (HCC): ICD-10-CM

## 2023-07-25 DIAGNOSIS — G47.33 OSA (OBSTRUCTIVE SLEEP APNEA): ICD-10-CM

## 2023-07-25 DIAGNOSIS — J44.9 ASTHMA-COPD OVERLAP SYNDROME (HCC): Primary | ICD-10-CM

## 2023-07-25 DIAGNOSIS — J44.9 COPD, GROUP D, BY GOLD 2017 CLASSIFICATION (HCC): ICD-10-CM

## 2023-07-25 DIAGNOSIS — J44.9 STEROID-DEPENDENT COPD (HCC): ICD-10-CM

## 2023-07-25 DIAGNOSIS — I27.20 PULMONARY HYPERTENSION (HCC): ICD-10-CM

## 2023-07-25 PROCEDURE — 3078F DIAST BP <80 MM HG: CPT | Performed by: INTERNAL MEDICINE

## 2023-07-25 PROCEDURE — 3074F SYST BP LT 130 MM HG: CPT | Performed by: INTERNAL MEDICINE

## 2023-07-25 PROCEDURE — 99215 OFFICE O/P EST HI 40 MIN: CPT | Performed by: INTERNAL MEDICINE

## 2023-07-25 RX ORDER — FLUTICASONE PROPIONATE AND SALMETEROL XINAFOATE 230; 21 UG/1; UG/1
2 AEROSOL, METERED RESPIRATORY (INHALATION) 2 TIMES DAILY
Qty: 3 EACH | Refills: 3 | Status: SHIPPED | OUTPATIENT
Start: 2023-07-25

## 2023-07-25 RX ORDER — AZITHROMYCIN 250 MG/1
250 TABLET, FILM COATED ORAL
Qty: 40 TABLET | Refills: 3 | Status: SHIPPED | OUTPATIENT
Start: 2023-07-26

## 2023-07-25 RX ORDER — PREDNISONE 10 MG/1
10 TABLET ORAL DAILY
Qty: 10 TABLET | Refills: 0 | Status: SHIPPED | OUTPATIENT
Start: 2023-07-25 | End: 2023-08-04

## 2023-07-25 RX ORDER — PREDNISONE 10 MG/1
TABLET ORAL
Qty: 60 TABLET | Refills: 1 | Status: SHIPPED | OUTPATIENT
Start: 2023-07-25

## 2023-07-25 ASSESSMENT — PATIENT HEALTH QUESTIONNAIRE - PHQ9
1. LITTLE INTEREST OR PLEASURE IN DOING THINGS: 2
SUM OF ALL RESPONSES TO PHQ QUESTIONS 1-9: 3
SUM OF ALL RESPONSES TO PHQ QUESTIONS 1-9: 3
SUM OF ALL RESPONSES TO PHQ9 QUESTIONS 1 & 2: 3
SUM OF ALL RESPONSES TO PHQ QUESTIONS 1-9: 3
2. FEELING DOWN, DEPRESSED OR HOPELESS: 1
SUM OF ALL RESPONSES TO PHQ QUESTIONS 1-9: 3

## 2023-07-25 NOTE — PROGRESS NOTES
Elisa Leon presents today for   Chief Complaint   Patient presents with    COPD     follow up from 9/23/2022    Asthma     Severe persistent    Malaise    Obesity     Morbid severe d/t excess calories    Other     Pulmonary HTN, chronic respiratory failure    Results     6MW 7/21/2023, DVT (BLE) study 7/7/2023, Cxr 6/17/,6/23 & 7/6/2023 [facility]), Echo 1/18/2023       Is someone accompanying this pt? No    Is the patient using any DME equipment during OV? Yes. NIV, O2, nebulizer, walker, wheelchair, glucometer    -[employer]    Depression Screening:    PHQ-9 Questionaire 7/25/2023   Little interest or pleasure in doing things 2   Feeling down, depressed, or hopeless 1   Trouble falling or staying asleep, or sleeping too much -   Feeling tired or having little energy -   Poor appetite or overeating -   Feeling bad about yourself - or that you are a failure or have let yourself or your family down -   Trouble concentrating on things, such as reading the newspaper or watching television -   Moving or speaking so slowly that other people could have noticed. Or the opposite - being so fidgety or restless that you have been moving around a lot more than usual -   PHQ-9 Total Score 3       Learning Needs Questionnaire:     Who is the primary learner? Patient    What is the preferred language for health care of the primary learner? ENGLISH    How does the primary learner prefer to learn new concepts? READING    Answered By Patient    Relationship to Learner SELF          Fall Risk:     Fall Risk 7/25/2023   2 or more falls in past year? no   Fall with injury in past year? no        Abuse Screening: AMB Abuse Screening 7/25/2023   Do you ever feel afraid of your partner? N   Are you in a relationship with someone who physically or mentally threatens you? N   Is it safe for you to go home? Y         Coordination of Care:    1.  Have you been to the ER, urgent care clinic since your
nightly    Continue DuoNebs as needed    Continue Albuterol HFA 1-2puffs q4-6h PRN. Counseled patient that this is their rescue inhaler. Also counseled patient regarding premedication 15-30m prior to exercise or exposure to very cold air  -Counseled patient on proper inhaler technique  -Counseled patient to avoid potential triggers of asthma  -Continue loratadine daily  -Continue trilogy nightly and PRN during daytime, congratulated patient on good compliance. Counseled patient to change mask/tubing/filters every 3 to 6 months. Counseled patient against sleepy driving. Will have DME company perform download  -Continue supplemental oxygen, 2-3L pulse dosing at all times and qhs  -Management of immunology issues per A&I per Dr. Burgess Lim. Appreciate input  -Follow-up with cardiology with regards to CHF. CHF lifestyle precautions including fluid restriction, daily weights, low-sodium diet, etc.  -No further LDCT, pt completed 15 years of surveillance post smoking cessation  -Management of osteoporosis per PCP  -Weight loss  -Advised patient to followup with pulmonary rehab to begin graded exercise  -Immunizations reviewed, influenza and pneumococcal vaccinations up-to-date      Return in about 3 months (around 10/25/2023).     Orders Placed This Encounter    fluticasone-salmeterol (ADVAIR HFA) 230-21 MCG/ACT inhaler     Sig: Inhale 2 puffs into the lungs 2 times daily Rinse and gargle after each use     Dispense:  3 each     Refill:  3    predniSONE (DELTASONE) 10 MG tablet     Sig: Take 1 tablet by mouth daily for 10 days     Dispense:  10 tablet     Refill:  0    predniSONE (DELTASONE) 10 MG tablet     Sig: Alternate 10mg with 5mg every other day for 2 weeks, then go to 5mg daily thereafter     Dispense:  60 tablet     Refill:  1    tiotropium (SPIRIVA RESPIMAT) 2.5 MCG/ACT AERS inhaler     Sig: Inhale 2 puffs into the lungs daily     Dispense:  3 each     Refill:  3       Farida Nayak MD/MPH     Pulmonary,

## 2023-08-14 ENCOUNTER — HOSPITAL ENCOUNTER (EMERGENCY)
Facility: HOSPITAL | Age: 63
Discharge: HOME | End: 2023-08-14
Payer: MEDICARE

## 2023-08-14 ENCOUNTER — APPOINTMENT (OUTPATIENT)
Facility: HOSPITAL | Age: 63
End: 2023-08-14
Payer: MEDICARE

## 2023-08-14 VITALS
RESPIRATION RATE: 20 BRPM | HEART RATE: 84 BPM | SYSTOLIC BLOOD PRESSURE: 145 MMHG | DIASTOLIC BLOOD PRESSURE: 82 MMHG | OXYGEN SATURATION: 97 % | TEMPERATURE: 97.7 F

## 2023-08-14 DIAGNOSIS — J40 BRONCHITIS: ICD-10-CM

## 2023-08-14 DIAGNOSIS — J44.1 COPD EXACERBATION (HCC): Primary | ICD-10-CM

## 2023-08-14 LAB
ALBUMIN SERPL-MCNC: 4.2 G/DL (ref 3.4–5)
ALBUMIN/GLOB SERPL: 1.1 (ref 0.8–1.7)
ALP SERPL-CCNC: 105 U/L (ref 45–117)
ALT SERPL-CCNC: 41 U/L (ref 13–56)
ANION GAP SERPL CALC-SCNC: 8 MMOL/L (ref 3–18)
AST SERPL-CCNC: 20 U/L (ref 10–38)
BASOPHILS # BLD: 0 K/UL (ref 0–0.1)
BASOPHILS NFR BLD: 0 % (ref 0–2)
BILIRUB SERPL-MCNC: 0.8 MG/DL (ref 0.2–1)
BUN SERPL-MCNC: 15 MG/DL (ref 7–18)
BUN/CREAT SERPL: 15 (ref 12–20)
CALCIUM SERPL-MCNC: 9.9 MG/DL (ref 8.5–10.1)
CHLORIDE SERPL-SCNC: 101 MMOL/L (ref 100–111)
CO2 SERPL-SCNC: 28 MMOL/L (ref 21–32)
CREAT SERPL-MCNC: 0.97 MG/DL (ref 0.6–1.3)
DIFFERENTIAL METHOD BLD: ABNORMAL
EOSINOPHIL # BLD: 0 K/UL (ref 0–0.4)
EOSINOPHIL NFR BLD: 0 % (ref 0–5)
ERYTHROCYTE [DISTWIDTH] IN BLOOD BY AUTOMATED COUNT: 14.6 % (ref 11.6–14.5)
FLUAV RNA SPEC QL NAA+PROBE: NOT DETECTED
FLUBV RNA SPEC QL NAA+PROBE: NOT DETECTED
GLOBULIN SER CALC-MCNC: 3.9 G/DL (ref 2–4)
GLUCOSE SERPL-MCNC: 160 MG/DL (ref 74–99)
HCT VFR BLD AUTO: 43 % (ref 35–45)
HGB BLD-MCNC: 14.9 G/DL (ref 12–16)
IMM GRANULOCYTES # BLD AUTO: 0.1 K/UL (ref 0–0.04)
IMM GRANULOCYTES NFR BLD AUTO: 1 % (ref 0–0.5)
LYMPHOCYTES # BLD: 2 K/UL (ref 0.9–3.6)
LYMPHOCYTES NFR BLD: 33 % (ref 21–52)
MCH RBC QN AUTO: 29.4 PG (ref 24–34)
MCHC RBC AUTO-ENTMCNC: 34.7 G/DL (ref 31–37)
MCV RBC AUTO: 84.8 FL (ref 78–100)
MONOCYTES # BLD: 0.5 K/UL (ref 0.05–1.2)
MONOCYTES NFR BLD: 7 % (ref 3–10)
NEUTS SEG # BLD: 3.7 K/UL (ref 1.8–8)
NEUTS SEG NFR BLD: 59 % (ref 40–73)
NRBC # BLD: 0 K/UL (ref 0–0.01)
NRBC BLD-RTO: 0 PER 100 WBC
PLATELET # BLD AUTO: 318 K/UL (ref 135–420)
PMV BLD AUTO: 8.8 FL (ref 9.2–11.8)
POTASSIUM SERPL-SCNC: 4.2 MMOL/L (ref 3.5–5.5)
PROT SERPL-MCNC: 8.1 G/DL (ref 6.4–8.2)
RBC # BLD AUTO: 5.07 M/UL (ref 4.2–5.3)
SARS-COV-2 RNA RESP QL NAA+PROBE: NOT DETECTED
SODIUM SERPL-SCNC: 137 MMOL/L (ref 136–145)
TROPONIN I SERPL HS-MCNC: 6 NG/L (ref 0–54)
WBC # BLD AUTO: 6.3 K/UL (ref 4.6–13.2)

## 2023-08-14 PROCEDURE — 93005 ELECTROCARDIOGRAM TRACING: CPT | Performed by: EMERGENCY MEDICINE

## 2023-08-14 PROCEDURE — 87636 SARSCOV2 & INF A&B AMP PRB: CPT

## 2023-08-14 PROCEDURE — 96366 THER/PROPH/DIAG IV INF ADDON: CPT

## 2023-08-14 PROCEDURE — 99285 EMERGENCY DEPT VISIT HI MDM: CPT

## 2023-08-14 PROCEDURE — 84484 ASSAY OF TROPONIN QUANT: CPT

## 2023-08-14 PROCEDURE — 71046 X-RAY EXAM CHEST 2 VIEWS: CPT

## 2023-08-14 PROCEDURE — 6370000000 HC RX 637 (ALT 250 FOR IP): Performed by: EMERGENCY MEDICINE

## 2023-08-14 PROCEDURE — 85025 COMPLETE CBC W/AUTO DIFF WBC: CPT

## 2023-08-14 PROCEDURE — 6360000002 HC RX W HCPCS: Performed by: EMERGENCY MEDICINE

## 2023-08-14 PROCEDURE — 96365 THER/PROPH/DIAG IV INF INIT: CPT

## 2023-08-14 PROCEDURE — 2700000000 HC OXYGEN THERAPY PER DAY

## 2023-08-14 PROCEDURE — 80053 COMPREHEN METABOLIC PANEL: CPT

## 2023-08-14 RX ORDER — PREDNISONE 20 MG/1
60 TABLET ORAL
Status: COMPLETED | OUTPATIENT
Start: 2023-08-14 | End: 2023-08-14

## 2023-08-14 RX ORDER — DOXYCYCLINE HYCLATE 100 MG
100 TABLET ORAL 2 TIMES DAILY
Qty: 20 TABLET | Refills: 0 | Status: SHIPPED | OUTPATIENT
Start: 2023-08-14 | End: 2023-08-24

## 2023-08-14 RX ORDER — ALBUTEROL SULFATE 2.5 MG/3ML
2.5 SOLUTION RESPIRATORY (INHALATION)
Status: COMPLETED | OUTPATIENT
Start: 2023-08-14 | End: 2023-08-14

## 2023-08-14 RX ORDER — GUAIFENESIN 600 MG/1
600 TABLET, EXTENDED RELEASE ORAL 2 TIMES DAILY
Qty: 30 TABLET | Refills: 0 | Status: SHIPPED | OUTPATIENT
Start: 2023-08-14 | End: 2023-08-29

## 2023-08-14 RX ORDER — GUAIFENESIN 600 MG/1
600 TABLET, EXTENDED RELEASE ORAL
Status: COMPLETED | OUTPATIENT
Start: 2023-08-14 | End: 2023-08-14

## 2023-08-14 RX ORDER — PREDNISONE 50 MG/1
50 TABLET ORAL DAILY
Qty: 5 TABLET | Refills: 0 | Status: SHIPPED | OUTPATIENT
Start: 2023-08-14 | End: 2023-08-19

## 2023-08-14 RX ORDER — IPRATROPIUM BROMIDE AND ALBUTEROL SULFATE 2.5; .5 MG/3ML; MG/3ML
1 SOLUTION RESPIRATORY (INHALATION)
Status: COMPLETED | OUTPATIENT
Start: 2023-08-14 | End: 2023-08-14

## 2023-08-14 RX ORDER — MAGNESIUM SULFATE IN WATER 40 MG/ML
2000 INJECTION, SOLUTION INTRAVENOUS
Status: COMPLETED | OUTPATIENT
Start: 2023-08-14 | End: 2023-08-14

## 2023-08-14 RX ADMIN — PREDNISONE 60 MG: 20 TABLET ORAL at 18:29

## 2023-08-14 RX ADMIN — MAGNESIUM SULFATE HEPTAHYDRATE 2000 MG: 40 INJECTION, SOLUTION INTRAVENOUS at 18:29

## 2023-08-14 RX ADMIN — IPRATROPIUM BROMIDE AND ALBUTEROL SULFATE 1 DOSE: .5; 2.5 SOLUTION RESPIRATORY (INHALATION) at 18:29

## 2023-08-14 RX ADMIN — GUAIFENESIN 600 MG: 600 TABLET, EXTENDED RELEASE ORAL at 18:29

## 2023-08-14 RX ADMIN — ALBUTEROL SULFATE 2.5 MG: 2.5 SOLUTION RESPIRATORY (INHALATION) at 18:29

## 2023-08-14 ASSESSMENT — ENCOUNTER SYMPTOMS
ALLERGIC/IMMUNOLOGIC NEGATIVE: 1
EYES NEGATIVE: 1
CHEST TIGHTNESS: 1
COUGH: 1
GASTROINTESTINAL NEGATIVE: 1
SHORTNESS OF BREATH: 1
WHEEZING: 1

## 2023-08-14 ASSESSMENT — PAIN - FUNCTIONAL ASSESSMENT
PAIN_FUNCTIONAL_ASSESSMENT: NONE - DENIES PAIN
PAIN_FUNCTIONAL_ASSESSMENT: NONE - DENIES PAIN

## 2023-08-14 NOTE — ED PROVIDER NOTES
not in acute distress. Appearance: Normal appearance. She is obese. She is not toxic-appearing. Comments: On 2 L nasal cannula   HENT:      Head: Normocephalic. Nose: Nose normal.      Mouth/Throat:      Mouth: Mucous membranes are moist.   Eyes:      Extraocular Movements: Extraocular movements intact. Cardiovascular:      Rate and Rhythm: Normal rate and regular rhythm. Pulses: Normal pulses. Heart sounds: Normal heart sounds. Pulmonary:      Effort: Pulmonary effort is normal.      Breath sounds: Examination of the right-upper field reveals decreased breath sounds. Examination of the left-upper field reveals decreased breath sounds. Examination of the right-middle field reveals decreased breath sounds. Examination of the left-middle field reveals decreased breath sounds. Examination of the right-lower field reveals wheezing. Examination of the left-lower field reveals wheezing. Decreased breath sounds and wheezing present. Abdominal:      General: Abdomen is flat. Tenderness: There is no abdominal tenderness. Musculoskeletal:         General: No deformity. Cervical back: Normal range of motion and neck supple. No rigidity. Skin:     General: Skin is warm. Neurological:      Mental Status: She is alert and oriented to person, place, and time. Psychiatric:         Mood and Affect: Mood normal.         Behavior: Behavior normal.         Thought Content: Thought content normal.         Judgment: Judgment normal.       DIAGNOSTIC RESULTS     EKG: All EKG's are interpreted by the Emergency Department Physician who either signs or Co-signs this chart in the absence of a cardiologist.        RADIOLOGY:   Non-plain film images such as CT, Ultrasound and MRI are read by the radiologist. Plain radiographic images are visualized and preliminarily interpreted by the emergency physician with the below findings:    No definite infiltrate; unchanged from 6/1/23.     Interpretation

## 2023-08-14 NOTE — ED TRIAGE NOTES
Pt states she has been Sob for past few day with chest tightness. Pt has hx of COPD and took neb tx w/o relief. Pt on 2L nasal canuila chronically.

## 2023-08-15 LAB
EKG ATRIAL RATE: 75 BPM
EKG DIAGNOSIS: NORMAL
EKG P AXIS: 36 DEGREES
EKG P-R INTERVAL: 150 MS
EKG Q-T INTERVAL: 418 MS
EKG QRS DURATION: 136 MS
EKG QTC CALCULATION (BAZETT): 466 MS
EKG R AXIS: 67 DEGREES
EKG T AXIS: -4 DEGREES
EKG VENTRICULAR RATE: 75 BPM

## 2023-08-15 PROCEDURE — 93010 ELECTROCARDIOGRAM REPORT: CPT | Performed by: INTERNAL MEDICINE

## 2023-08-22 ENCOUNTER — TELEPHONE (OUTPATIENT)
Age: 63
End: 2023-08-22

## 2023-08-22 RX ORDER — PREDNISONE 10 MG/1
TABLET ORAL
Qty: 70 TABLET | Refills: 0 | Status: SHIPPED | OUTPATIENT
Start: 2023-08-22 | End: 2023-09-19

## 2023-08-22 NOTE — TELEPHONE ENCOUNTER
Patient states she has been to ER x 2 in the last week for SOB. 8/14/23 [facility] given Prednisone 50mg x 5 days and doxy x 7 days  8/19/23 [place] [facility], they added 3 days of 50mg Prednisone and advised to continue the Doxy. Today the end of both meds and patient still very sob and bad cough. On nothing for the cough. Patient states she can not take a deep breath. Xray was done and advised bronchitis Patient wants to know if Dr. Anabella Barreto feels she needs something different.

## 2023-08-22 NOTE — TELEPHONE ENCOUNTER
Due to severe exacerbation, prescribed extended taper, 40mg x 7d, decrease by 10mg every 7d until back to chronic dose.         Gill Sanchez MD/MPH     Pulmonary, Critical Care Medicine  [facility]

## 2023-08-22 NOTE — TELEPHONE ENCOUNTER
Pt stated that she is experiencing SOB mucus is green/brown. Pt would like to speak with nurse. Pt does have an appt 11/1/23.  Please call [phone]

## 2023-08-24 ENCOUNTER — TELEPHONE (OUTPATIENT)
Age: 63
End: 2023-08-24

## 2023-08-24 NOTE — TELEPHONE ENCOUNTER
Advised patient we can not do a network override for aurelio. She would have to switch to a medicare plan Brandie is in network with.  She will check out other plans

## 2023-08-24 NOTE — TELEPHONE ENCOUNTER
Pt would like to speak with nurse in regards to receiving out of network auth.  Please call [phone]

## 2023-08-28 ENCOUNTER — TELEPHONE (OUTPATIENT)
Age: 63
End: 2023-08-28

## 2023-08-28 NOTE — TELEPHONE ENCOUNTER
Infusion Center wants to get the ok for pt to get her infusion because she is on Lequavin (antibiotic).       [phone] Call for further information

## 2023-08-29 ENCOUNTER — HOSPITAL ENCOUNTER (OUTPATIENT)
Facility: HOSPITAL | Age: 63
Setting detail: INFUSION SERIES
End: 2023-08-29
Payer: MEDICARE

## 2023-08-29 VITALS
SYSTOLIC BLOOD PRESSURE: 160 MMHG | OXYGEN SATURATION: 93 % | DIASTOLIC BLOOD PRESSURE: 80 MMHG | TEMPERATURE: 97.5 F | RESPIRATION RATE: 20 BRPM | HEART RATE: 76 BPM

## 2023-08-29 DIAGNOSIS — J45.51 SEVERE PERSISTENT ASTHMA WITH EXACERBATION: Primary | ICD-10-CM

## 2023-08-29 PROCEDURE — 6360000002 HC RX W HCPCS: Performed by: INTERNAL MEDICINE

## 2023-08-29 PROCEDURE — 96372 THER/PROPH/DIAG INJ SC/IM: CPT

## 2023-08-29 RX ORDER — EPINEPHRINE 1 MG/ML
0.3 INJECTION, SOLUTION, CONCENTRATE INTRAVENOUS PRN
Status: CANCELLED | OUTPATIENT
Start: 2023-10-24

## 2023-08-29 RX ORDER — ACETAMINOPHEN 325 MG/1
650 TABLET ORAL
Status: CANCELLED | OUTPATIENT
Start: 2023-10-24

## 2023-08-29 RX ORDER — DIPHENHYDRAMINE HYDROCHLORIDE 50 MG/ML
50 INJECTION INTRAMUSCULAR; INTRAVENOUS
Status: CANCELLED | OUTPATIENT
Start: 2023-10-24

## 2023-08-29 RX ORDER — SODIUM CHLORIDE 9 MG/ML
INJECTION, SOLUTION INTRAVENOUS CONTINUOUS
Status: CANCELLED | OUTPATIENT
Start: 2023-10-24

## 2023-08-29 RX ORDER — ALBUTEROL SULFATE 90 UG/1
4 AEROSOL, METERED RESPIRATORY (INHALATION) PRN
Status: CANCELLED | OUTPATIENT
Start: 2023-10-24

## 2023-08-29 RX ORDER — ONDANSETRON 2 MG/ML
8 INJECTION INTRAMUSCULAR; INTRAVENOUS
Status: CANCELLED | OUTPATIENT
Start: 2023-10-24

## 2023-08-29 RX ADMIN — BENRALIZUMAB 30 MG: 30 INJECTION, SOLUTION SUBCUTANEOUS at 14:58

## 2023-08-29 ASSESSMENT — PAIN DESCRIPTION - DESCRIPTORS: DESCRIPTORS: ACHING

## 2023-08-29 ASSESSMENT — PAIN DESCRIPTION - LOCATION: LOCATION: FOOT;HAND

## 2023-08-29 ASSESSMENT — PAIN DESCRIPTION - ORIENTATION: ORIENTATION: RIGHT;LEFT

## 2023-08-29 ASSESSMENT — PAIN DESCRIPTION - PAIN TYPE: TYPE: CHRONIC PAIN

## 2023-08-29 ASSESSMENT — PAIN SCALES - GENERAL: PAINLEVEL_OUTOF10: 6

## 2023-09-05 NOTE — ED TRIAGE NOTES
Patient arrived from home c/o of shortness of breath and wheezing. Patient states that she was discharged from [facility] in October and has been taking antibiotics since that discharge. Patient stated that she went to see her doctor on Friday November 9 and he told her to come to the ER if her symptoms worsened. Pt did not answer, no voicemail.

## 2023-10-20 NOTE — ED NOTES
Received patient in fast track bed 2. Patient complaining of chest tightness and SOB. Patient has hx of asthma, COPD. Will administer medication as ordered by provider. Yes

## 2023-10-24 ENCOUNTER — HOSPITAL ENCOUNTER (OUTPATIENT)
Facility: HOSPITAL | Age: 63
Setting detail: INFUSION SERIES
Discharge: HOME | End: 2023-10-27
Payer: MEDICARE

## 2023-10-24 VITALS
HEART RATE: 80 BPM | TEMPERATURE: 97.5 F | OXYGEN SATURATION: 93 % | RESPIRATION RATE: 20 BRPM | SYSTOLIC BLOOD PRESSURE: 131 MMHG | DIASTOLIC BLOOD PRESSURE: 80 MMHG

## 2023-10-24 DIAGNOSIS — J45.51 SEVERE PERSISTENT ASTHMA WITH EXACERBATION: Primary | ICD-10-CM

## 2023-10-24 PROCEDURE — 96372 THER/PROPH/DIAG INJ SC/IM: CPT

## 2023-10-24 PROCEDURE — 6360000002 HC RX W HCPCS: Performed by: INTERNAL MEDICINE

## 2023-10-24 RX ORDER — EPINEPHRINE 1 MG/ML
0.3 INJECTION, SOLUTION, CONCENTRATE INTRAVENOUS PRN
OUTPATIENT
Start: 2023-12-19

## 2023-10-24 RX ORDER — ONDANSETRON 2 MG/ML
8 INJECTION INTRAMUSCULAR; INTRAVENOUS
OUTPATIENT
Start: 2023-12-19

## 2023-10-24 RX ORDER — ALBUTEROL SULFATE 90 UG/1
4 AEROSOL, METERED RESPIRATORY (INHALATION) PRN
OUTPATIENT
Start: 2023-12-19

## 2023-10-24 RX ORDER — DIPHENHYDRAMINE HYDROCHLORIDE 50 MG/ML
50 INJECTION INTRAMUSCULAR; INTRAVENOUS
OUTPATIENT
Start: 2023-12-19

## 2023-10-24 RX ORDER — ACETAMINOPHEN 325 MG/1
650 TABLET ORAL
OUTPATIENT
Start: 2023-12-19

## 2023-10-24 RX ORDER — SODIUM CHLORIDE 9 MG/ML
INJECTION, SOLUTION INTRAVENOUS CONTINUOUS
OUTPATIENT
Start: 2023-12-19

## 2023-10-24 RX ADMIN — BENRALIZUMAB 30 MG: 30 INJECTION, SOLUTION SUBCUTANEOUS at 15:28

## 2023-10-24 ASSESSMENT — PAIN DESCRIPTION - PAIN TYPE: TYPE: CHRONIC PAIN

## 2023-10-24 ASSESSMENT — PAIN SCALES - GENERAL: PAINLEVEL_OUTOF10: 6

## 2023-10-24 ASSESSMENT — PAIN DESCRIPTION - LOCATION: LOCATION: FOOT;HAND

## 2023-10-24 ASSESSMENT — PAIN DESCRIPTION - DESCRIPTORS: DESCRIPTORS: ACHING

## 2023-10-24 ASSESSMENT — PAIN DESCRIPTION - ORIENTATION: ORIENTATION: RIGHT;LEFT

## 2023-11-01 ENCOUNTER — OFFICE VISIT (OUTPATIENT)
Age: 63
End: 2023-11-01
Payer: MEDICARE

## 2023-11-01 VITALS
BODY MASS INDEX: 45.86 KG/M2 | WEIGHT: 258.8 LBS | HEART RATE: 81 BPM | TEMPERATURE: 97.1 F | RESPIRATION RATE: 18 BRPM | DIASTOLIC BLOOD PRESSURE: 71 MMHG | SYSTOLIC BLOOD PRESSURE: 153 MMHG | HEIGHT: 63 IN | OXYGEN SATURATION: 94 %

## 2023-11-01 DIAGNOSIS — J44.9 STEROID-DEPENDENT COPD (HCC): ICD-10-CM

## 2023-11-01 DIAGNOSIS — E66.01 MORBID OBESITY (HCC): ICD-10-CM

## 2023-11-01 DIAGNOSIS — Z92.241 STEROID-DEPENDENT COPD (HCC): ICD-10-CM

## 2023-11-01 DIAGNOSIS — J44.9 ASTHMA-COPD OVERLAP SYNDROME: Primary | ICD-10-CM

## 2023-11-01 DIAGNOSIS — J81.0 ACUTE PULMONARY EDEMA (HCC): ICD-10-CM

## 2023-11-01 DIAGNOSIS — J44.9 COPD, GROUP D, BY GOLD 2017 CLASSIFICATION (HCC): ICD-10-CM

## 2023-11-01 DIAGNOSIS — I27.20 PULMONARY HYPERTENSION (HCC): ICD-10-CM

## 2023-11-01 DIAGNOSIS — J96.11 CHRONIC RESPIRATORY FAILURE WITH HYPOXIA (HCC): ICD-10-CM

## 2023-11-01 DIAGNOSIS — G47.33 OSA (OBSTRUCTIVE SLEEP APNEA): ICD-10-CM

## 2023-11-01 PROCEDURE — 99215 OFFICE O/P EST HI 40 MIN: CPT | Performed by: INTERNAL MEDICINE

## 2023-11-01 PROCEDURE — 3074F SYST BP LT 130 MM HG: CPT | Performed by: INTERNAL MEDICINE

## 2023-11-01 PROCEDURE — 3078F DIAST BP <80 MM HG: CPT | Performed by: INTERNAL MEDICINE

## 2023-11-01 RX ORDER — PREDNISONE 10 MG/1
5 TABLET ORAL DAILY
Qty: 45 TABLET | Refills: 1 | Status: SHIPPED | OUTPATIENT
Start: 2023-11-01

## 2023-11-01 RX ORDER — LISINOPRIL 10 MG/1
TABLET ORAL
COMMUNITY
Start: 2023-10-20 | End: 2023-11-01

## 2023-11-01 RX ORDER — FLUTICASONE PROPIONATE AND SALMETEROL XINAFOATE 230; 21 UG/1; UG/1
2 AEROSOL, METERED RESPIRATORY (INHALATION) 2 TIMES DAILY
Qty: 3 EACH | Refills: 3 | Status: SHIPPED | OUTPATIENT
Start: 2023-11-01

## 2023-11-01 ASSESSMENT — SLEEP AND FATIGUE QUESTIONNAIRES
HOW LIKELY ARE YOU TO NOD OFF OR FALL ASLEEP WHILE SITTING AND READING: 1
HOW LIKELY ARE YOU TO NOD OFF OR FALL ASLEEP IN A CAR, WHILE STOPPED FOR A FEW MINUTES IN TRAFFIC: 0
HOW LIKELY ARE YOU TO NOD OFF OR FALL ASLEEP WHILE SITTING AND TALKING TO SOMEONE: 0
ESS TOTAL SCORE: 10
HOW LIKELY ARE YOU TO NOD OFF OR FALL ASLEEP WHILE LYING DOWN TO REST IN THE AFTERNOON WHEN CIRCUMSTANCES PERMIT: 3
HOW LIKELY ARE YOU TO NOD OFF OR FALL ASLEEP WHEN YOU ARE A PASSENGER IN A CAR FOR AN HOUR WITHOUT A BREAK: 2
HOW LIKELY ARE YOU TO NOD OFF OR FALL ASLEEP WHILE SITTING INACTIVE IN A PUBLIC PLACE: 0
HOW LIKELY ARE YOU TO NOD OFF OR FALL ASLEEP WHILE WATCHING TV: 3
HOW LIKELY ARE YOU TO NOD OFF OR FALL ASLEEP WHILE SITTING QUIETLY AFTER LUNCH WITHOUT ALCOHOL: 1

## 2023-11-01 NOTE — PROGRESS NOTES
Mart Chandler presents today for   Chief Complaint   Patient presents with    COPD     Follow up form 2023    Asthma     -COPD overlap syndrome    Sleep Apnea     obstructive    Obesity     morbid    respiratory failure     Chronic with hypoxia    pulmonary HTN    Other     Steroid-dependent COPD    Results     Echo 10/10/2023 [facility]), CXR , ,  & 10/9/2023 [facility])       Is someone accompanying this pt? No    Is the patient using any DME equipment during OV? Yes. Nebulizer, glucometer, NIV, O2, walker, wheelchair    -[employer]    Depression Screenin/25/2023     1:52 PM   PHQ-9 Questionaire   Little interest or pleasure in doing things 2   Feeling down, depressed, or hopeless 1   PHQ-9 Total Score 3       Learning Needs Questionnaire:     Who is the primary learner? Patient    What is the preferred language for health care of the primary learner? ENGLISH    How does the primary learner prefer to learn new concepts? READING    Answered By Patient    Relationship to Learner SELF          Fall Risk:         2023     2:04 PM   Fall Risk   2 or more falls in past year? no   Fall with injury in past year? no        Abuse Screenin/25/2023     1:00 PM   AMB Abuse Screening   Do you ever feel afraid of your partner? N   Are you in a relationship with someone who physically or mentally threatens you? N   Is it safe for you to go home? Y         Coordination of Care:    1. Have you been to the ER, urgent care clinic since your last visit? Hospitalized since your last visit? Yes. [facility] ED-2023-COPD exacerbation and bronchitis, [facility] ED-2023-COPD exacerbation & 2023-chest pain and SOB & [facility]-10/9-10/11/2023-SOB, acute cough, pneumonia and COPD exacerbation    2. Have you seen or consulted any other health care providers outside of the [address] [place] [address] since your last visit? Include any pap smears or colon screening. Yes.  Dr. Violetta Diop
of tobacco use: Quit smoking in 2006  13. Osteoporosis: Seen on DEXA scan from 11/2/2020. Secondary to chronic steroid use. Pt off of Prolia due to insurance issues  14. 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 Caio Arce  16. Most recent exacerbation of respiratory failure requiring hospitalization, likely triggered by flash pulmonary edema    Plan:  -Continue diuretic management per cardiology and PCP. Patient reports being on Lasix 40 mg twice daily. I advised to discuss with her team with regards to addition of Aldactone as a possible adjunct. I also counseled the patient extensively regarding fluid restriction of 1.5 L and sodium restriction of 1.5 g/day  -Continue prednisone taper, decreased from 10 mg after 1 week to 5 mg daily thereafter.,  Again with chronic prednisone therapy, at this point benefits outweigh risks given repeated exacerbations and severity of sx. Counseled regarding long-term side effects of corticosteroid therapy  including osteoporosis with possible pathologic fracture, glaucoma, cataracts, stress ulcer, elevated blood sugars/worsening diabetes and its sequelae  -Continue chronic azithromycin therapy 250 mg p.o. every Monday/Wednesday/Friday  -Continue dual ICS/LABA/LAMA/LTRA/anti-IL5R therapy    Given reduction in exacerbations and improvement while on therapy, will continue Fasenra therapy SQ [id number]    Continue Advair /21mcg 2 puffs twice daily. Counseled rinse mouth thoroughly after use    Continue pulmicort nebs 1mg daily. Counseled to rinse mouth thoroughly after each use    Continue Spiriva Respimat 2.5mcg 2 puffs daily    Continue Singulair 10 mg nightly    Continue DuoNebs as needed    Continue Albuterol HFA 1-2puffs q4-6h PRN. Counseled patient that this is their rescue inhaler.   Also counseled patient regarding premedication 15-30m prior to exercise or

## 2023-11-30 ENCOUNTER — HOSPITAL ENCOUNTER (OUTPATIENT)
Facility: HOSPITAL | Age: 63
Discharge: HOME | End: 2023-11-30
Attending: FAMILY MEDICINE
Payer: MEDICARE

## 2023-11-30 DIAGNOSIS — M81.0 OSTEOPOROSIS, UNSPECIFIED OSTEOPOROSIS TYPE, UNSPECIFIED PATHOLOGICAL FRACTURE PRESENCE: ICD-10-CM

## 2023-11-30 PROCEDURE — 77080 DXA BONE DENSITY AXIAL: CPT

## 2023-12-19 ENCOUNTER — HOSPITAL ENCOUNTER (OUTPATIENT)
Facility: HOSPITAL | Age: 63
Setting detail: INFUSION SERIES
Discharge: HOME | End: 2023-12-22
Payer: MEDICARE

## 2023-12-19 VITALS
DIASTOLIC BLOOD PRESSURE: 74 MMHG | TEMPERATURE: 97.6 F | HEART RATE: 81 BPM | OXYGEN SATURATION: 94 % | SYSTOLIC BLOOD PRESSURE: 132 MMHG | RESPIRATION RATE: 24 BRPM

## 2023-12-19 DIAGNOSIS — J45.51 SEVERE PERSISTENT ASTHMA WITH EXACERBATION: Primary | ICD-10-CM

## 2023-12-19 PROCEDURE — 6360000002 HC RX W HCPCS: Performed by: INTERNAL MEDICINE

## 2023-12-19 PROCEDURE — 96372 THER/PROPH/DIAG INJ SC/IM: CPT

## 2023-12-19 RX ORDER — DIPHENHYDRAMINE HYDROCHLORIDE 50 MG/ML
50 INJECTION INTRAMUSCULAR; INTRAVENOUS
OUTPATIENT
Start: 2024-02-13

## 2023-12-19 RX ORDER — SODIUM CHLORIDE 9 MG/ML
INJECTION, SOLUTION INTRAVENOUS CONTINUOUS
OUTPATIENT
Start: 2024-02-13

## 2023-12-19 RX ORDER — ONDANSETRON 2 MG/ML
8 INJECTION INTRAMUSCULAR; INTRAVENOUS
OUTPATIENT
Start: 2024-02-13

## 2023-12-19 RX ORDER — EPINEPHRINE 1 MG/ML
0.3 INJECTION, SOLUTION, CONCENTRATE INTRAVENOUS PRN
OUTPATIENT
Start: 2024-02-13

## 2023-12-19 RX ORDER — ACETAMINOPHEN 325 MG/1
650 TABLET ORAL
OUTPATIENT
Start: 2024-02-13

## 2023-12-19 RX ORDER — ALBUTEROL SULFATE 90 UG/1
4 AEROSOL, METERED RESPIRATORY (INHALATION) PRN
OUTPATIENT
Start: 2024-02-13

## 2023-12-19 RX ADMIN — BENRALIZUMAB 30 MG: 30 INJECTION, SOLUTION SUBCUTANEOUS at 14:12

## 2024-02-06 RX ORDER — DIPHENHYDRAMINE HYDROCHLORIDE 50 MG/ML
50 INJECTION INTRAMUSCULAR; INTRAVENOUS
Status: CANCELLED | OUTPATIENT
Start: 2024-02-11

## 2024-02-06 RX ORDER — ACETAMINOPHEN 325 MG/1
650 TABLET ORAL
Status: CANCELLED | OUTPATIENT
Start: 2024-02-11

## 2024-02-06 RX ORDER — ALBUTEROL SULFATE 90 UG/1
4 AEROSOL, METERED RESPIRATORY (INHALATION) PRN
Status: CANCELLED | OUTPATIENT
Start: 2024-02-11

## 2024-02-06 RX ORDER — SODIUM CHLORIDE 9 MG/ML
INJECTION, SOLUTION INTRAVENOUS CONTINUOUS
Status: CANCELLED | OUTPATIENT
Start: 2024-02-11

## 2024-02-06 RX ORDER — EPINEPHRINE 1 MG/ML
0.3 INJECTION, SOLUTION, CONCENTRATE INTRAVENOUS PRN
Status: CANCELLED | OUTPATIENT
Start: 2024-02-11

## 2024-02-06 RX ORDER — ONDANSETRON 2 MG/ML
8 INJECTION INTRAMUSCULAR; INTRAVENOUS
Status: CANCELLED | OUTPATIENT
Start: 2024-02-11

## 2024-02-13 ENCOUNTER — HOSPITAL ENCOUNTER (OUTPATIENT)
Facility: HOSPITAL | Age: 64
Setting detail: INFUSION SERIES
Discharge: HOME | End: 2024-02-16
Payer: MEDICARE

## 2024-02-13 VITALS
HEART RATE: 72 BPM | RESPIRATION RATE: 20 BRPM | DIASTOLIC BLOOD PRESSURE: 74 MMHG | TEMPERATURE: 98.8 F | SYSTOLIC BLOOD PRESSURE: 137 MMHG

## 2024-02-13 DIAGNOSIS — J45.51 SEVERE PERSISTENT ASTHMA WITH EXACERBATION: Primary | ICD-10-CM

## 2024-02-13 PROCEDURE — 96372 THER/PROPH/DIAG INJ SC/IM: CPT

## 2024-02-13 PROCEDURE — 6360000002 HC RX W HCPCS: Performed by: INTERNAL MEDICINE

## 2024-02-13 RX ORDER — ALBUTEROL SULFATE 90 UG/1
4 AEROSOL, METERED RESPIRATORY (INHALATION) PRN
OUTPATIENT
Start: 2024-04-09

## 2024-02-13 RX ORDER — EPINEPHRINE 1 MG/ML
0.3 INJECTION, SOLUTION, CONCENTRATE INTRAVENOUS PRN
OUTPATIENT
Start: 2024-04-09

## 2024-02-13 RX ORDER — DIPHENHYDRAMINE HYDROCHLORIDE 50 MG/ML
50 INJECTION INTRAMUSCULAR; INTRAVENOUS
OUTPATIENT
Start: 2024-04-09

## 2024-02-13 RX ORDER — ACETAMINOPHEN 325 MG/1
650 TABLET ORAL
OUTPATIENT
Start: 2024-04-09

## 2024-02-13 RX ORDER — SODIUM CHLORIDE 9 MG/ML
INJECTION, SOLUTION INTRAVENOUS CONTINUOUS
OUTPATIENT
Start: 2024-04-09

## 2024-02-13 RX ORDER — ONDANSETRON 2 MG/ML
8 INJECTION INTRAMUSCULAR; INTRAVENOUS
OUTPATIENT
Start: 2024-04-09

## 2024-02-13 RX ADMIN — BENRALIZUMAB 30 MG: 30 INJECTION, SOLUTION SUBCUTANEOUS at 14:38

## 2024-02-13 NOTE — PROGRESS NOTES
[facility] Progress Note    Date: 2024    Name: Olayinka Jones    MRN: [id number]         : 1960      Ms. Jones arrived to [facility] at 1430.    Ms. Jones was assessed and education was provided.     Ms. Jones's vitals were reviewed.  Vitals:    24 1435   BP: 137/74   Pulse: 72   Resp: 20   Temp: 98.8 °F (37.1 °C)       benralizumab (FASENRA) injection 30 mg was administered as ordered SQ in patient's Left upper arm, site was dressed with a band-aid.    Ms. Jones tolerated well without complaints.    Discharge/ follow-up instructions discussed with patient who verbalized understanding of all information received.    Patient's armband was removed & shredded.    [facility] condition at 1442.  She is to return on 24 at 1430 for her next appointment.    Hedy Avelar RN  2024

## 2024-02-27 ENCOUNTER — HOSPITAL ENCOUNTER (OUTPATIENT)
Facility: HOSPITAL | Age: 64
Discharge: HOME | End: 2024-02-29
Attending: FAMILY MEDICINE
Payer: MEDICARE

## 2024-02-27 ENCOUNTER — APPOINTMENT (OUTPATIENT)
Facility: HOSPITAL | Age: 64
End: 2024-02-27
Payer: MEDICARE

## 2024-02-27 ENCOUNTER — HOSPITAL ENCOUNTER (EMERGENCY)
Facility: HOSPITAL | Age: 64
Discharge: HOME | End: 2024-02-28
Attending: STUDENT IN AN ORGANIZED HEALTH CARE EDUCATION/TRAINING PROGRAM
Payer: MEDICARE

## 2024-02-27 VITALS
WEIGHT: 259 LBS | HEART RATE: 72 BPM | BODY MASS INDEX: 45.89 KG/M2 | HEIGHT: 63 IN | SYSTOLIC BLOOD PRESSURE: 137 MMHG | DIASTOLIC BLOOD PRESSURE: 74 MMHG

## 2024-02-27 DIAGNOSIS — R06.02 SHORTNESS OF BREATH: ICD-10-CM

## 2024-02-27 DIAGNOSIS — J44.1 COPD EXACERBATION (HCC): Primary | ICD-10-CM

## 2024-02-27 DIAGNOSIS — I50.30 DIASTOLIC CONGESTIVE HEART FAILURE, UNSPECIFIED HF CHRONICITY (HCC): ICD-10-CM

## 2024-02-27 LAB
ALBUMIN SERPL-MCNC: 3.8 G/DL (ref 3.4–5)
ALBUMIN/GLOB SERPL: 1.1 (ref 0.8–1.7)
ALP SERPL-CCNC: 98 U/L (ref 45–117)
ALT SERPL-CCNC: 28 U/L (ref 13–56)
ANION GAP SERPL CALC-SCNC: 7 MMOL/L (ref 3–18)
AST SERPL-CCNC: 19 U/L (ref 10–38)
BASOPHILS # BLD: 0 K/UL (ref 0–0.1)
BASOPHILS NFR BLD: 0 % (ref 0–2)
BILIRUB SERPL-MCNC: 0.8 MG/DL (ref 0.2–1)
BUN SERPL-MCNC: 15 MG/DL (ref 7–18)
BUN/CREAT SERPL: 14 (ref 12–20)
CALCIUM SERPL-MCNC: 9.5 MG/DL (ref 8.5–10.1)
CHLORIDE SERPL-SCNC: 103 MMOL/L (ref 100–111)
CO2 SERPL-SCNC: 26 MMOL/L (ref 21–32)
CREAT SERPL-MCNC: 1.04 MG/DL (ref 0.6–1.3)
DIFFERENTIAL METHOD BLD: ABNORMAL
ECHO AO ASC DIAM: 2.8 CM
ECHO AO ASCENDING AORTA INDEX: 1.3 CM/M2
ECHO AO ROOT DIAM: 2.7 CM
ECHO AO ROOT INDEX: 1.25 CM/M2
ECHO AV AREA PEAK VELOCITY: 1.7 CM2
ECHO AV AREA VTI: 2 CM2
ECHO AV AREA/BSA PEAK VELOCITY: 0.8 CM2/M2
ECHO AV AREA/BSA VTI: 0.9 CM2/M2
ECHO AV MEAN GRADIENT: 10 MMHG
ECHO AV MEAN VELOCITY: 1.4 M/S
ECHO AV PEAK GRADIENT: 21 MMHG
ECHO AV PEAK VELOCITY: 2.3 M/S
ECHO AV VELOCITY RATIO: 0.61
ECHO AV VTI: 41.2 CM
ECHO BSA: 2.29 M2
ECHO LA DIAMETER INDEX: 1.81 CM/M2
ECHO LA DIAMETER: 3.9 CM
ECHO LA TO AORTIC ROOT RATIO: 1.44
ECHO LA VOL A-L A2C: 81 ML (ref 22–52)
ECHO LA VOL A-L A4C: 64 ML (ref 22–52)
ECHO LA VOL BP: 70 ML (ref 22–52)
ECHO LA VOL MOD A2C: 77 ML (ref 22–52)
ECHO LA VOL MOD A4C: 60 ML (ref 22–52)
ECHO LA VOL/BSA BIPLANE: 32 ML/M2 (ref 16–34)
ECHO LA VOLUME AREA LENGTH: 75 ML
ECHO LA VOLUME INDEX A-L A2C: 38 ML/M2 (ref 16–34)
ECHO LA VOLUME INDEX A-L A4C: 30 ML/M2 (ref 16–34)
ECHO LA VOLUME INDEX AREA LENGTH: 35 ML/M2 (ref 16–34)
ECHO LA VOLUME INDEX MOD A2C: 36 ML/M2 (ref 16–34)
ECHO LA VOLUME INDEX MOD A4C: 28 ML/M2 (ref 16–34)
ECHO LV E' LATERAL VELOCITY: 10 CM/S
ECHO LV E' SEPTAL VELOCITY: 6 CM/S
ECHO LV FRACTIONAL SHORTENING: 40 % (ref 28–44)
ECHO LV INTERNAL DIMENSION DIASTOLE INDEX: 1.94 CM/M2
ECHO LV INTERNAL DIMENSION DIASTOLIC: 4.2 CM (ref 3.9–5.3)
ECHO LV INTERNAL DIMENSION SYSTOLIC INDEX: 1.16 CM/M2
ECHO LV INTERNAL DIMENSION SYSTOLIC: 2.5 CM
ECHO LV IVSD: 1.2 CM (ref 0.6–0.9)
ECHO LV MASS 2D: 167.4 G (ref 67–162)
ECHO LV MASS INDEX 2D: 77.5 G/M2 (ref 43–95)
ECHO LV POSTERIOR WALL DIASTOLIC: 1.1 CM (ref 0.6–0.9)
ECHO LV RELATIVE WALL THICKNESS RATIO: 0.52
ECHO LVOT AREA: 2.8 CM2
ECHO LVOT AV VTI INDEX: 0.72
ECHO LVOT DIAM: 1.9 CM
ECHO LVOT MEAN GRADIENT: 3 MMHG
ECHO LVOT PEAK GRADIENT: 8 MMHG
ECHO LVOT PEAK VELOCITY: 1.4 M/S
ECHO LVOT STROKE VOLUME INDEX: 38.8 ML/M2
ECHO LVOT SV: 83.9 ML
ECHO LVOT VTI: 29.6 CM
ECHO MV A VELOCITY: 1.2 M/S
ECHO MV AREA VTI: 2.8 CM2
ECHO MV E DECELERATION TIME (DT): 248.7 MS
ECHO MV E VELOCITY: 1.09 M/S
ECHO MV E/A RATIO: 0.91
ECHO MV E/E' LATERAL: 10.9
ECHO MV E/E' RATIO (AVERAGED): 14.53
ECHO MV LVOT VTI INDEX: 1.02
ECHO MV MAX VELOCITY: 1.2 M/S
ECHO MV MEAN GRADIENT: 2 MMHG
ECHO MV MEAN VELOCITY: 0.7 M/S
ECHO MV PEAK GRADIENT: 6 MMHG
ECHO MV VTI: 30.3 CM
ECHO PULMONARY ARTERY SYSTOLIC PRESSURE (PASP): 42 MMHG
ECHO PV MAX VELOCITY: 1.1 M/S
ECHO PV PEAK GRADIENT: 5 MMHG
ECHO RA END SYSTOLIC VOLUME APICAL 4 CHAMBER INDEX BSA: 21 ML/M2
ECHO RA VOLUME: 45 ML
ECHO RV FREE WALL PEAK S': 19 CM/S
ECHO RV INTERNAL DIMENSION: 4 CM
ECHO RV TAPSE: 2.8 CM (ref 1.7–?)
ECHO TV REGURGITANT MAX VELOCITY: 3.12 M/S
ECHO TV REGURGITANT PEAK GRADIENT: 39 MMHG
EOSINOPHIL # BLD: 0 K/UL (ref 0–0.4)
EOSINOPHIL NFR BLD: 0 % (ref 0–5)
ERYTHROCYTE [DISTWIDTH] IN BLOOD BY AUTOMATED COUNT: 13.2 % (ref 11.6–14.5)
GLOBULIN SER CALC-MCNC: 3.5 G/DL (ref 2–4)
GLUCOSE SERPL-MCNC: 225 MG/DL (ref 74–99)
HCT VFR BLD AUTO: 38.8 % (ref 35–45)
HGB BLD-MCNC: 13.4 G/DL (ref 12–16)
IMM GRANULOCYTES # BLD AUTO: 0 K/UL (ref 0–0.04)
IMM GRANULOCYTES NFR BLD AUTO: 1 % (ref 0–0.5)
LYMPHOCYTES # BLD: 1.4 K/UL (ref 0.9–3.6)
LYMPHOCYTES NFR BLD: 28 % (ref 21–52)
MCH RBC QN AUTO: 30.5 PG (ref 24–34)
MCHC RBC AUTO-ENTMCNC: 34.5 G/DL (ref 31–37)
MCV RBC AUTO: 88.2 FL (ref 78–100)
MONOCYTES # BLD: 0.5 K/UL (ref 0.05–1.2)
MONOCYTES NFR BLD: 10 % (ref 3–10)
NEUTS SEG # BLD: 3.1 K/UL (ref 1.8–8)
NEUTS SEG NFR BLD: 62 % (ref 40–73)
NRBC # BLD: 0 K/UL (ref 0–0.01)
NRBC BLD-RTO: 0 PER 100 WBC
NT PRO BNP: 64 PG/ML (ref 0–900)
PLATELET # BLD AUTO: 312 K/UL (ref 135–420)
PMV BLD AUTO: 8.8 FL (ref 9.2–11.8)
POTASSIUM SERPL-SCNC: 4 MMOL/L (ref 3.5–5.5)
PROT SERPL-MCNC: 7.3 G/DL (ref 6.4–8.2)
RBC # BLD AUTO: 4.4 M/UL (ref 4.2–5.3)
SODIUM SERPL-SCNC: 136 MMOL/L (ref 136–145)
TROPONIN I SERPL HS-MCNC: 10 NG/L (ref 0–54)
WBC # BLD AUTO: 5.1 K/UL (ref 4.6–13.2)

## 2024-02-27 PROCEDURE — 80053 COMPREHEN METABOLIC PANEL: CPT

## 2024-02-27 PROCEDURE — 71046 X-RAY EXAM CHEST 2 VIEWS: CPT

## 2024-02-27 PROCEDURE — C8929 TTE W OR WO FOL WCON,DOPPLER: HCPCS

## 2024-02-27 PROCEDURE — 85025 COMPLETE CBC W/AUTO DIFF WBC: CPT

## 2024-02-27 PROCEDURE — 83880 ASSAY OF NATRIURETIC PEPTIDE: CPT

## 2024-02-27 PROCEDURE — 84484 ASSAY OF TROPONIN QUANT: CPT

## 2024-02-27 PROCEDURE — 6360000004 HC RX CONTRAST MEDICATION: Performed by: FAMILY MEDICINE

## 2024-02-27 PROCEDURE — 81001 URINALYSIS AUTO W/SCOPE: CPT

## 2024-02-27 PROCEDURE — 93005 ELECTROCARDIOGRAM TRACING: CPT | Performed by: STUDENT IN AN ORGANIZED HEALTH CARE EDUCATION/TRAINING PROGRAM

## 2024-02-27 PROCEDURE — 99285 EMERGENCY DEPT VISIT HI MDM: CPT

## 2024-02-27 RX ADMIN — PERFLUTREN 1.5 ML: 6.52 INJECTION, SUSPENSION INTRAVENOUS at 10:01

## 2024-02-27 ASSESSMENT — PAIN DESCRIPTION - PAIN TYPE: TYPE: ACUTE PAIN

## 2024-02-27 ASSESSMENT — PAIN - FUNCTIONAL ASSESSMENT: PAIN_FUNCTIONAL_ASSESSMENT: 0-10

## 2024-02-27 ASSESSMENT — PAIN DESCRIPTION - LOCATION: LOCATION: CHEST

## 2024-02-27 ASSESSMENT — PAIN SCALES - GENERAL: PAINLEVEL_OUTOF10: 6

## 2024-02-28 VITALS
WEIGHT: 258 LBS | HEIGHT: 63 IN | DIASTOLIC BLOOD PRESSURE: 80 MMHG | TEMPERATURE: 98.2 F | BODY MASS INDEX: 45.71 KG/M2 | SYSTOLIC BLOOD PRESSURE: 133 MMHG | OXYGEN SATURATION: 95 % | HEART RATE: 97 BPM | RESPIRATION RATE: 25 BRPM

## 2024-02-28 LAB
APPEARANCE UR: CLEAR
BACTERIA URNS QL MICRO: ABNORMAL /HPF
BILIRUB UR QL: NEGATIVE
COLOR UR: YELLOW
EKG ATRIAL RATE: 92 BPM
EKG DIAGNOSIS: NORMAL
EKG P AXIS: 69 DEGREES
EKG P-R INTERVAL: 150 MS
EKG Q-T INTERVAL: 372 MS
EKG QRS DURATION: 126 MS
EKG QTC CALCULATION (BAZETT): 460 MS
EKG R AXIS: 110 DEGREES
EKG T AXIS: 12 DEGREES
EKG VENTRICULAR RATE: 92 BPM
EPITH CASTS URNS QL MICRO: ABNORMAL /LPF (ref 0–5)
GLUCOSE UR STRIP.AUTO-MCNC: >1000 MG/DL
HGB UR QL STRIP: NEGATIVE
KETONES UR QL STRIP.AUTO: ABNORMAL MG/DL
LEUKOCYTE ESTERASE UR QL STRIP.AUTO: NEGATIVE
MUCOUS THREADS URNS QL MICRO: ABNORMAL /LPF
NITRITE UR QL STRIP.AUTO: NEGATIVE
PH UR STRIP: 5.5 (ref 5–8)
PROT UR STRIP-MCNC: ABNORMAL MG/DL
RBC #/AREA URNS HPF: NEGATIVE /HPF (ref 0–5)
SP GR UR REFRACTOMETRY: >1.03 (ref 1–1.03)
TROPONIN I SERPL HS-MCNC: 9 NG/L (ref 0–54)
UROBILINOGEN UR QL STRIP.AUTO: 1 EU/DL (ref 0.2–1)
WBC URNS QL MICRO: ABNORMAL /HPF (ref 0–4)

## 2024-02-28 PROCEDURE — 93010 ELECTROCARDIOGRAM REPORT: CPT | Performed by: INTERNAL MEDICINE

## 2024-02-28 PROCEDURE — 84484 ASSAY OF TROPONIN QUANT: CPT

## 2024-02-28 PROCEDURE — 6370000000 HC RX 637 (ALT 250 FOR IP): Performed by: STUDENT IN AN ORGANIZED HEALTH CARE EDUCATION/TRAINING PROGRAM

## 2024-02-28 RX ORDER — PREDNISONE 20 MG/1
20 TABLET ORAL 2 TIMES DAILY
Qty: 10 TABLET | Refills: 0 | Status: SHIPPED | OUTPATIENT
Start: 2024-02-28 | End: 2024-03-04

## 2024-02-28 RX ORDER — IPRATROPIUM BROMIDE AND ALBUTEROL SULFATE 2.5; .5 MG/3ML; MG/3ML
1 SOLUTION RESPIRATORY (INHALATION)
Status: COMPLETED | OUTPATIENT
Start: 2024-02-28 | End: 2024-02-28

## 2024-02-28 RX ORDER — PREDNISONE 20 MG/1
60 TABLET ORAL
Status: COMPLETED | OUTPATIENT
Start: 2024-02-28 | End: 2024-02-28

## 2024-02-28 RX ADMIN — IPRATROPIUM BROMIDE AND ALBUTEROL SULFATE 1 DOSE: .5; 3 SOLUTION RESPIRATORY (INHALATION) at 02:19

## 2024-02-28 RX ADMIN — PREDNISONE 60 MG: 20 TABLET ORAL at 00:46

## 2024-02-28 RX ADMIN — IPRATROPIUM BROMIDE AND ALBUTEROL SULFATE 1 DOSE: .5; 3 SOLUTION RESPIRATORY (INHALATION) at 00:46

## 2024-02-28 ASSESSMENT — ENCOUNTER SYMPTOMS
CONSTIPATION: 0
ABDOMINAL PAIN: 0
NAUSEA: 0
BACK PAIN: 0
SHORTNESS OF BREATH: 1
DIARRHEA: 0

## 2024-02-28 NOTE — DISCHARGE INSTRUCTIONS
You were evaluated for shortness of breath and copd exacerbation .  Based on your work-up it was deemed that she was stable for discharge.  Please  your medication of prednisone which was prescribed to you.  Please follow-up with your primary care physician if you have any further concerns and go over your work-up.  If you experience any chest pain, shortness of breath, worsening abdominal pain, vomiting blood, worsening headache, seizures, or any worsening of your symptoms please return to the emergency department immediately.  If you have any pending results or any further questions please contact the emergency department at [phone].

## 2024-02-28 NOTE — ED TRIAGE NOTES
Pt presents to ED c/o SOB and CP that started yesterday morning. Pt states she has increased work of breathing and has had to increase the L of O2 she is using at home.  Hx COPD on 2L NC

## 2024-02-28 NOTE — ED PROVIDER NOTES
EMERGENCY DEPARTMENT HISTORY AND PHYSICAL EXAM    4:32 AM      Date: 2/27/2024  Patient Name: Olayinka Jones    History of Presenting Illness     Chief Complaint   Patient presents with    Shortness of Breath       History From: Patient  HPI  63-year-old female with history of heart failure, obesity, COPD, asthma, ACS, hyperlipidemia, hypertension who presents with shortness of breath.  Patient says for the past that he has been having shortness of breath.  Dyspnea with exertion.  Chronically on 2 L of oxygen.  No alleviating factors.  Denies sick contacts, recent travel, or any other changes.  When assessing ROS she denies any chest pain, nausea, vomiting, abdominal pain, lower extremity swelling, fevers, hemoptysis, or any other changes.     Nursing Notes were all reviewed and agreed with or any disagreements were addressed in the HPI.    PCP: Raquel Lopez MD    No current facility-administered medications for this encounter.     Current Outpatient Medications   Medication Sig Dispense Refill    predniSONE (DELTASONE) 20 MG tablet Take 1 tablet by mouth 2 times daily for 5 days 10 tablet 0    fluticasone-salmeterol (ADVAIR HFA) 230-21 MCG/ACT inhaler Inhale 2 puffs into the lungs 2 times daily Rinse and gargle after each use 3 each 3    tiotropium (SPIRIVA RESPIMAT) 2.5 MCG/ACT AERS inhaler Inhale 2 puffs into the lungs daily 3 each 3    predniSONE (DELTASONE) 10 MG tablet Take 0.5 tablets by mouth daily 45 tablet 1    azithromycin (ZITHROMAX) 250 MG tablet Take 1 tablet by mouth three times a week 40 tablet 3    azelastine (ASTELIN) 0.1 % nasal spray 1-2 sprays by Nasal route (Patient not taking: Reported on 7/25/2023)      ergocalciferol (ERGOCALCIFEROL) 1.25 MG (57370 UT) capsule Take 1 capsule by mouth every 7 days      famotidine (PEPCID) 20 MG tablet Take 1 tablet by mouth as needed (Patient not taking: Reported on 7/25/2023)      ACCU-CHEK GUIDE strip USE 1 STRIP TO CHECK GLUCOSE THREE

## 2024-04-09 ENCOUNTER — HOSPITAL ENCOUNTER (OUTPATIENT)
Facility: HOSPITAL | Age: 64
Setting detail: INFUSION SERIES
Discharge: HOME | End: 2024-04-12
Payer: MEDICARE

## 2024-04-09 VITALS
SYSTOLIC BLOOD PRESSURE: 134 MMHG | HEART RATE: 80 BPM | TEMPERATURE: 97.3 F | OXYGEN SATURATION: 92 % | DIASTOLIC BLOOD PRESSURE: 73 MMHG | RESPIRATION RATE: 22 BRPM

## 2024-04-09 DIAGNOSIS — J45.51 SEVERE PERSISTENT ASTHMA WITH EXACERBATION: Primary | ICD-10-CM

## 2024-04-09 PROCEDURE — 96372 THER/PROPH/DIAG INJ SC/IM: CPT

## 2024-04-09 PROCEDURE — 6360000002 HC RX W HCPCS: Performed by: INTERNAL MEDICINE

## 2024-04-09 RX ORDER — ACETAMINOPHEN 325 MG/1
650 TABLET ORAL
OUTPATIENT
Start: 2024-06-04

## 2024-04-09 RX ORDER — EPINEPHRINE 1 MG/ML
0.3 INJECTION, SOLUTION, CONCENTRATE INTRAVENOUS PRN
OUTPATIENT
Start: 2024-06-04

## 2024-04-09 RX ORDER — SODIUM CHLORIDE 9 MG/ML
INJECTION, SOLUTION INTRAVENOUS CONTINUOUS
OUTPATIENT
Start: 2024-06-04

## 2024-04-09 RX ORDER — ALBUTEROL SULFATE 90 UG/1
4 AEROSOL, METERED RESPIRATORY (INHALATION) PRN
OUTPATIENT
Start: 2024-06-04

## 2024-04-09 RX ORDER — ONDANSETRON 2 MG/ML
8 INJECTION INTRAMUSCULAR; INTRAVENOUS
OUTPATIENT
Start: 2024-06-04

## 2024-04-09 RX ORDER — DIPHENHYDRAMINE HYDROCHLORIDE 50 MG/ML
50 INJECTION INTRAMUSCULAR; INTRAVENOUS
OUTPATIENT
Start: 2024-06-04

## 2024-04-09 RX ADMIN — BENRALIZUMAB 30 MG: 30 INJECTION, SOLUTION SUBCUTANEOUS at 14:23

## 2024-04-09 ASSESSMENT — PAIN SCALES - GENERAL: PAINLEVEL_OUTOF10: 0

## 2024-04-09 NOTE — PROGRESS NOTES
[facility] Progress Note    Date: 2024    Name: Olayinka Jones    MRN: [id number]         : 1960        FASENRA Q8 WEEKS      Ms. Jones arrived to [facility] at 1410 ambulatory. No complaints of pain. No acute respiratory distress noted. She is on 2L [place] oxygen.    Ms. Jones was assessed and education was provided. States she is tolerating fasenra well.    Ms. Jones's vitals were reviewed.  Vitals:    24 1411   BP: 134/73   Pulse: 80   Resp: 22   Temp: 97.3 °F (36.3 °C)   SpO2: 92%       Fasenra 30mg was administered as ordered SQ in patient's L upper arm. Band-aid applied to site. No irritation or bleeding at site.    Ms. Jones tolerated well without complaints.    Discharge/ follow-up instructions discussed w/ pt. Pt verbalized understanding.    Pt armband removed & shredded.    [facility] condition at 1425. She is to return on 24 at 1430 for her next appointment.    CHAY CASILLAS RN  2024

## 2024-05-15 ENCOUNTER — OFFICE VISIT (OUTPATIENT)
Age: 64
End: 2024-05-15
Payer: MEDICARE

## 2024-05-15 VITALS
RESPIRATION RATE: 20 BRPM | BODY MASS INDEX: 45.86 KG/M2 | HEIGHT: 63 IN | SYSTOLIC BLOOD PRESSURE: 126 MMHG | TEMPERATURE: 98.1 F | DIASTOLIC BLOOD PRESSURE: 56 MMHG | WEIGHT: 258.8 LBS | HEART RATE: 79 BPM | OXYGEN SATURATION: 92 %

## 2024-05-15 DIAGNOSIS — I27.20 PULMONARY HYPERTENSION (HCC): ICD-10-CM

## 2024-05-15 DIAGNOSIS — E66.01 MORBID OBESITY (HCC): ICD-10-CM

## 2024-05-15 DIAGNOSIS — J44.89 ASTHMA-COPD OVERLAP SYNDROME (HCC): Primary | ICD-10-CM

## 2024-05-15 DIAGNOSIS — J44.9 STEROID-DEPENDENT COPD (HCC): ICD-10-CM

## 2024-05-15 DIAGNOSIS — Z01.811 PREOP PULMONARY/RESPIRATORY EXAM: ICD-10-CM

## 2024-05-15 DIAGNOSIS — J44.9 COPD, GROUP D, BY GOLD 2017 CLASSIFICATION (HCC): ICD-10-CM

## 2024-05-15 DIAGNOSIS — Z92.241 STEROID-DEPENDENT COPD (HCC): ICD-10-CM

## 2024-05-15 DIAGNOSIS — J96.11 CHRONIC RESPIRATORY FAILURE WITH HYPOXIA (HCC): ICD-10-CM

## 2024-05-15 PROCEDURE — 3074F SYST BP LT 130 MM HG: CPT | Performed by: INTERNAL MEDICINE

## 2024-05-15 PROCEDURE — 99214 OFFICE O/P EST MOD 30 MIN: CPT | Performed by: INTERNAL MEDICINE

## 2024-05-15 PROCEDURE — 3078F DIAST BP <80 MM HG: CPT | Performed by: INTERNAL MEDICINE

## 2024-05-15 RX ORDER — ALBUTEROL SULFATE 2.5 MG/3ML
2.5 SOLUTION RESPIRATORY (INHALATION) EVERY 4 HOURS PRN
Qty: 360 EACH | Refills: 11 | Status: SHIPPED | OUTPATIENT
Start: 2024-05-15

## 2024-05-15 NOTE — PROGRESS NOTES
Olayinka Kolby Fergusonman presents today for   Chief Complaint   Patient presents with    Follow-up    COPD    Sleep Apnea    Respiratory Failure     Chronic with Hypoxia    Pulmonary HTN       Is someone accompanying this pt? No    Is the patient using any DME equipment during OV? Nebulizer, glucometer, NIV, O2, walker, wheelchair     -[employer]    Depression Screenin/25/2023     1:52 PM   PHQ-9 Questionaire   Little interest or pleasure in doing things 2   Feeling down, depressed, or hopeless 1   PHQ-9 Total Score 3       Learning Needs Questionnaire:     No question data found.      Fall Risk:         2023     2:04 PM   Fall Risk   2 or more falls in past year? no   Fall with injury in past year? no        Abuse Screenin/25/2023     1:00 PM   AMB Abuse Screening   Do you ever feel afraid of your partner? N   Are you in a relationship with someone who physically or mentally threatens you? N   Is it safe for you to go home? Y         Coordination of Care:    1. Have you been to the ER, urgent care clinic since your last visit? Hospitalized since your last visit? Yes    2. Have you seen or consulted any other health care providers outside of the [facility] since your last visit? Include any pap smears or colon screening. No    Medication list has been update per patient.      
Breast Cancer Mother         Bilateral mastectomies    Cancer Mother         ovarian and breast    Heart Failure Mother     Ovarian Cancer Mother     Heart Attack Father             Cancer Maternal Grandfather         stomach    Cancer Maternal Aunt         pancreatic    Asthma Child     Lung Disease Sister     Heart Failure Sister     Cancer Sister         ovarian    COPD Sister         Heavy smoker    Heart Surgery Father         CABG    Heart Failure Father      Social History     Tobacco Use    Smoking status: Former     Current packs/day: 0.00     Average packs/day: 1 pack/day for 40.3 years (40.3 ttl pk-yrs)     Types: Cigarettes     Start date: 1966     Quit date: 2006     Years since quittin.7    Smokeless tobacco: Former    Tobacco comments:     Quit smokin-1.5 packs per day   Vaping Use    Vaping Use: Never used   Substance Use Topics    Alcohol use: No    Drug use: No     Prior to Admission medications    Medication Sig Start Date End Date Taking? Authorizing Provider   fluticasone-salmeterol (ADVAIR HFA) 230-21 MCG/ACT inhaler Inhale 2 puffs into the lungs 2 times daily Rinse and gargle after each use 23  Yes Stuart Key MD   tiotropium (SPIRIVA RESPIMAT) 2.5 MCG/ACT AERS inhaler Inhale 2 puffs into the lungs daily 23  Yes Stuart Key MD   predniSONE (DELTASONE) 10 MG tablet Take 0.5 tablets by mouth daily 23  Yes Stuart Key MD   azithromycin (ZITHROMAX) 250 MG tablet Take 1 tablet by mouth three times a week 23  Yes Stuart Key MD   ergocalciferol (ERGOCALCIFEROL) 1.25 MG (15626 UT) capsule Take 1 capsule by mouth every 7 days 09  Yes Korina Craft MD   ACCU-CHEK GUIDE strip USE 1 STRIP TO CHECK GLUCOSE THREE TIMES DAILY 23  Yes Korina Craft MD   insulin glargine (LANTUS SOLOSTAR) 100 UNIT/ML injection pen Inject 10 Units into the skin 2 times daily 10u in the morning and 65u at night   Yes Korina Craft MD   BE[employer]

## 2024-06-04 ENCOUNTER — HOSPITAL ENCOUNTER (OUTPATIENT)
Facility: HOSPITAL | Age: 64
Setting detail: INFUSION SERIES
End: 2024-06-04

## 2024-06-07 ENCOUNTER — TELEPHONE (OUTPATIENT)
Age: 64
End: 2024-06-07

## 2024-06-07 NOTE — TELEPHONE ENCOUNTER
Pt would like to know if she's  able to get infusions please advise [phone]. Pt does have an appt on 9/23

## 2024-06-07 NOTE — TELEPHONE ENCOUNTER
Patient was called. She was in [facility] X 5 days. Pneumonia,Positive Blood Culture. Discharged 6/5/24. She continues to have productive cough with green sputum,SOB, wheezing. Taking Levaquin 750 mg daily finishes 6/13/24, Prednisone taper 40 mg today. She was suppose to have Fasenra 6/4/24. Infusion center told her to find out from you when she should reschedule ?

## 2024-06-19 ENCOUNTER — HOSPITAL ENCOUNTER (OUTPATIENT)
Facility: HOSPITAL | Age: 64
Setting detail: INFUSION SERIES
Discharge: HOME | End: 2024-06-22
Payer: MEDICARE

## 2024-06-19 VITALS
HEART RATE: 67 BPM | OXYGEN SATURATION: 96 % | DIASTOLIC BLOOD PRESSURE: 75 MMHG | SYSTOLIC BLOOD PRESSURE: 145 MMHG | RESPIRATION RATE: 20 BRPM | TEMPERATURE: 97 F

## 2024-06-19 DIAGNOSIS — J45.51 SEVERE PERSISTENT ASTHMA WITH EXACERBATION: Primary | ICD-10-CM

## 2024-06-19 PROCEDURE — 6360000002 HC RX W HCPCS: Performed by: INTERNAL MEDICINE

## 2024-06-19 PROCEDURE — 96372 THER/PROPH/DIAG INJ SC/IM: CPT

## 2024-06-19 RX ORDER — ACETAMINOPHEN 325 MG/1
650 TABLET ORAL
OUTPATIENT
Start: 2024-07-31

## 2024-06-19 RX ORDER — ALBUTEROL SULFATE 90 UG/1
4 AEROSOL, METERED RESPIRATORY (INHALATION) PRN
OUTPATIENT
Start: 2024-07-31

## 2024-06-19 RX ORDER — SODIUM CHLORIDE 9 MG/ML
INJECTION, SOLUTION INTRAVENOUS CONTINUOUS
OUTPATIENT
Start: 2024-07-31

## 2024-06-19 RX ORDER — EPINEPHRINE 1 MG/ML
0.3 INJECTION, SOLUTION, CONCENTRATE INTRAVENOUS PRN
OUTPATIENT
Start: 2024-07-31

## 2024-06-19 RX ORDER — ONDANSETRON 2 MG/ML
8 INJECTION INTRAMUSCULAR; INTRAVENOUS
OUTPATIENT
Start: 2024-07-31

## 2024-06-19 RX ORDER — METRONIDAZOLE 500 MG/1
500 TABLET ORAL 3 TIMES DAILY
COMMUNITY
Start: 2024-06-17 | End: 2024-06-23

## 2024-06-19 RX ORDER — DIPHENHYDRAMINE HYDROCHLORIDE 50 MG/ML
50 INJECTION INTRAMUSCULAR; INTRAVENOUS
OUTPATIENT
Start: 2024-07-31

## 2024-06-19 RX ADMIN — BENRALIZUMAB 30 MG: 30 INJECTION, SOLUTION SUBCUTANEOUS at 14:30

## 2024-06-19 ASSESSMENT — PAIN DESCRIPTION - ORIENTATION: ORIENTATION: RIGHT;LEFT

## 2024-06-19 ASSESSMENT — PAIN DESCRIPTION - FREQUENCY: FREQUENCY: CONTINUOUS

## 2024-06-19 ASSESSMENT — PAIN SCALES - GENERAL: PAINLEVEL_OUTOF10: 5

## 2024-06-19 ASSESSMENT — PAIN DESCRIPTION - LOCATION: LOCATION: FOOT;HAND

## 2024-06-19 ASSESSMENT — PAIN DESCRIPTION - PAIN TYPE: TYPE: NEUROPATHIC PAIN

## 2024-06-19 ASSESSMENT — PAIN DESCRIPTION - DESCRIPTORS: DESCRIPTORS: BURNING;OTHER (COMMENT)

## 2024-06-19 NOTE — PROGRESS NOTES
[facility] Progress Note    Date: 2024    Name: Olayinka Jones    MRN: [id number]         : 1960    Ms. Jones arrived in the [facility] today at 1410, ambulatory with assistance of a walker, and in stable condition, here for her FASENRA INJECTION (EVERY 8 WEEKS).  She was assessed and education was provided.     Ms. Jones's vitals were reviewed.  Vitals:    24 1410   BP: (!) 145/75   Pulse: 67   Resp: 20   Temp: 97 °F (36.1 °C)   SpO2: 96%     Ms. Jones states she was hospitalized the first part of  with Pneumonia. Last Friday, she was admitted back into [facility]  for an exacerbation of her COPD and was discharged Monday, 24. She was placed on Flagyl TID for 7 days, and has been having some mild loose stools 2-3 times/day.    Ms. Jones stated that she was doing fairly well, and was tolerating the FASENRA injections well, and  she voiced no major complaints.      .  Benralizumab (FASENRA) 30 mg, was administered SQ, in the back of her LEFT ARM at 1430, per order and without incident.            Ms. Jones tolerated the injection well and voiced no complaints.     Discharge instructions reviewed with patient and she expressed understanding. Her armband was removed and shredded.     [facility]. [facility], ambulatory and with assistance of a walker, and in stable condition at 1435.     She is to return in 8 weeks, on 24 at 1400, for her next appointment, for her next FASENRA Injection.       Leora Crenshaw RN  2024  5:59 PM

## 2024-06-24 NOTE — PROGRESS NOTES
Congestive Heart Failure Clinic   [facility]       Referring Provider: Wm Zuñiga APRCELESTINA-CNP  Primary Care Physician: Jana Sanchez MD   Cardiologist:   Nephrologist: n/a      HISTORY OF PRESENT ILLNESS:     Dg Peña is a 57 y.o. (1966) male with a history of HFimpEF, most recent EF:  Lab Results   Component Value Date    LVEF 63 08/03/2023    LVEFMODE Echo 01/21/2022         He presents to the CHF clinic for ongoing evaluation and monitoring of heart failure.    In the CHF clinic today he denies any adverse symptoms except:  [] Dizziness or lightheadedness   [] Syncope or near syncope  [] Recent Fall  [] Shortness of breath at rest  [x] Dyspnea with exertion recovers with rest  [x] Decline in functional capacity (unable to perform activities they had previously been able to do)  [] Fatigue   [x] Orthopnea  [] PND  [] Nocturnal cough  [] Hemoptysis  [] Chest pain, pressure, or discomfort  [] Palpitations  [] Abdominal distention  [] Abdominal bloating  [] Early satiety  [] Blood in stool   [] Diarrhea  [] Constipation  [] Nausea/Vomiting  [] Decreased urinary response to oral diuretic   [] Scrotal swelling   [x] Lower extremity edema  [] Used PRN doses of oral diuretic   [] Weight gain    Wt Readings from Last 3 Encounters:   06/24/24 59 kg (130 lb)   06/14/24 58.5 kg (129 lb)   05/31/24 58.1 kg (128 lb)           SOCIAL HISTORY:  [x] Denies tobacco, alcohol or illicit drug abuse  [] Tobacco use:  [] ETOH use:  [] Illicit drug use:        MEDICATIONS:    Allergies   Allergen Reactions    Levofloxacin Other (See Comments)     Other reaction(s): Abdominal pain    Lisinopril      Other reaction(s): COUGHING    Tramadol      Other reaction(s): SHAKING    Ibuprofen Nausea And Vomiting    Sulfa Antibiotics Nausea And Vomiting       Current Outpatient Medications:     Morphine Sulfate (MORPHINE 20MG/ML) SOLN concentrated solution, Take 0.5 mLs by mouth  Advair 230/21 therapeutically interchanged for Budesonide & Arformoterol inhalation vials per the [employer] approved therapeutic interchange policy.

## 2024-07-18 NOTE — ED NOTES
Report received from Lisa Villafana. Pt verbally responsive. A&Ox4. States she still feels some chest tightness, but otherwise her breathing feels better. Restart replacement once they are on puréed diet

## 2024-07-30 ENCOUNTER — APPOINTMENT (OUTPATIENT)
Facility: HOSPITAL | Age: 64
End: 2024-07-30
Payer: MEDICARE

## 2024-08-14 ENCOUNTER — HOSPITAL ENCOUNTER (OUTPATIENT)
Facility: HOSPITAL | Age: 64
Setting detail: INFUSION SERIES
Discharge: HOME | End: 2024-08-17
Payer: MEDICARE

## 2024-08-14 VITALS
RESPIRATION RATE: 20 BRPM | DIASTOLIC BLOOD PRESSURE: 77 MMHG | HEART RATE: 68 BPM | TEMPERATURE: 97 F | OXYGEN SATURATION: 96 % | SYSTOLIC BLOOD PRESSURE: 142 MMHG

## 2024-08-14 DIAGNOSIS — J45.51 SEVERE PERSISTENT ASTHMA WITH EXACERBATION: Primary | ICD-10-CM

## 2024-08-14 PROCEDURE — 96372 THER/PROPH/DIAG INJ SC/IM: CPT

## 2024-08-14 PROCEDURE — 6360000002 HC RX W HCPCS: Performed by: INTERNAL MEDICINE

## 2024-08-14 RX ORDER — ONDANSETRON 2 MG/ML
8 INJECTION INTRAMUSCULAR; INTRAVENOUS
OUTPATIENT
Start: 2024-10-09

## 2024-08-14 RX ORDER — ACETAMINOPHEN 325 MG/1
650 TABLET ORAL
OUTPATIENT
Start: 2024-10-09

## 2024-08-14 RX ORDER — ALBUTEROL SULFATE 90 UG/1
4 AEROSOL, METERED RESPIRATORY (INHALATION) PRN
OUTPATIENT
Start: 2024-10-09

## 2024-08-14 RX ORDER — EPINEPHRINE 1 MG/ML
0.3 INJECTION, SOLUTION, CONCENTRATE INTRAVENOUS PRN
OUTPATIENT
Start: 2024-10-09

## 2024-08-14 RX ORDER — SODIUM CHLORIDE 9 MG/ML
INJECTION, SOLUTION INTRAVENOUS CONTINUOUS
OUTPATIENT
Start: 2024-10-09

## 2024-08-14 RX ORDER — DIPHENHYDRAMINE HYDROCHLORIDE 50 MG/ML
50 INJECTION INTRAMUSCULAR; INTRAVENOUS
OUTPATIENT
Start: 2024-10-09

## 2024-08-14 RX ADMIN — BENRALIZUMAB 30 MG: 30 INJECTION, SOLUTION SUBCUTANEOUS at 14:16

## 2024-08-14 ASSESSMENT — PAIN DESCRIPTION - PAIN TYPE: TYPE: NEUROPATHIC PAIN

## 2024-08-14 ASSESSMENT — PAIN DESCRIPTION - ONSET: ONSET: ON-GOING

## 2024-08-14 ASSESSMENT — PAIN DESCRIPTION - LOCATION: LOCATION: FOOT;HAND

## 2024-08-14 ASSESSMENT — PAIN DESCRIPTION - ORIENTATION: ORIENTATION: RIGHT;LEFT

## 2024-08-14 ASSESSMENT — PAIN SCALES - GENERAL: PAINLEVEL_OUTOF10: 5

## 2024-08-14 ASSESSMENT — PAIN DESCRIPTION - FREQUENCY: FREQUENCY: CONTINUOUS

## 2024-08-14 NOTE — PROGRESS NOTES
[facility] Progress Note    Date: 2024    Name: Olayinka Jones    MRN: [id number]         : 1960        FASENRA Q8 WEEKS      Ms. Jones arrived to [facility] at 1400 ambulatory. Neuropathy pain to hands and feet 5/10. States she does not use her medication for neuropathy because sh is tired of taking so much  medications. No acute respiratory distress noted. She is on 2L [place] oxygen.    Ms. Jones was assessed and education was provided. States she is tolerating fasenra well.    Ms. Jones's vitals were reviewed.  Vitals:    24 1401   BP: (!) 142/77   Pulse: 68   Resp: 20   Temp: 97 °F (36.1 °C)   SpO2: 96%       Fasenra 30mg was administered as ordered SQ in patient's R upper arm. Band-aid applied to site. No irritation or bleeding at site.    Ms. Jones tolerated well without complaints.    Discharge/ follow-up instructions discussed w/ pt. Pt verbalized understanding.    Pt armband removed & shredded.    [facility]. [facility] in [facility] condition at 1420. She is to return on 10/9/24 at 1400 for her next appointment.    CHAY CASILLAS RN  2024

## 2024-09-13 PROBLEM — A49.8 FUSOBACTERIUM INFECTION: Status: ACTIVE | Noted: 2024-06-17

## 2024-09-13 RX ORDER — GUAIFENESIN AND DEXTROMETHORPHAN HYDROBROMIDE 1200; 60 MG/1; MG/1
1 TABLET, EXTENDED RELEASE ORAL EVERY 12 HOURS PRN
COMMUNITY
Start: 2024-06-12

## 2024-09-13 RX ORDER — LOSARTAN POTASSIUM 25 MG/1
25 TABLET ORAL DAILY
COMMUNITY
Start: 2024-09-10 | End: 2024-09-23

## 2024-09-13 RX ORDER — BENZONATATE 200 MG/1
200 CAPSULE ORAL 3 TIMES DAILY PRN
COMMUNITY
Start: 2024-09-10 | End: 2024-09-17

## 2024-09-13 RX ORDER — LATANOPROST 50 UG/ML
1 SOLUTION/ DROPS OPHTHALMIC NIGHTLY
COMMUNITY
Start: 2024-06-17 | End: 2024-09-23

## 2024-09-13 RX ORDER — LEVALBUTEROL INHALATION SOLUTION 0.63 MG/3ML
0.63 SOLUTION RESPIRATORY (INHALATION) 3 TIMES DAILY
COMMUNITY
Start: 2024-07-08 | End: 2025-07-08

## 2024-09-23 ENCOUNTER — OFFICE VISIT (OUTPATIENT)
Age: 64
End: 2024-09-23
Payer: MEDICARE

## 2024-09-23 VITALS
HEART RATE: 73 BPM | SYSTOLIC BLOOD PRESSURE: 173 MMHG | WEIGHT: 255.8 LBS | RESPIRATION RATE: 18 BRPM | DIASTOLIC BLOOD PRESSURE: 72 MMHG | BODY MASS INDEX: 45.32 KG/M2 | TEMPERATURE: 98.3 F | OXYGEN SATURATION: 94 % | HEIGHT: 63 IN

## 2024-09-23 DIAGNOSIS — E66.01 MORBID OBESITY: ICD-10-CM

## 2024-09-23 DIAGNOSIS — J96.11 CHRONIC RESPIRATORY FAILURE WITH HYPOXIA: ICD-10-CM

## 2024-09-23 DIAGNOSIS — J44.9 COPD, GROUP D, BY GOLD 2017 CLASSIFICATION (HCC): ICD-10-CM

## 2024-09-23 DIAGNOSIS — J44.89 ASTHMA-COPD OVERLAP SYNDROME (HCC): Primary | ICD-10-CM

## 2024-09-23 DIAGNOSIS — Z92.241 STEROID-DEPENDENT COPD (HCC): ICD-10-CM

## 2024-09-23 DIAGNOSIS — J44.9 STEROID-DEPENDENT COPD (HCC): ICD-10-CM

## 2024-09-23 DIAGNOSIS — I27.20 PULMONARY HYPERTENSION (HCC): ICD-10-CM

## 2024-09-23 PROCEDURE — 99214 OFFICE O/P EST MOD 30 MIN: CPT | Performed by: INTERNAL MEDICINE

## 2024-09-23 PROCEDURE — 1123F ACP DISCUSS/DSCN MKR DOCD: CPT | Performed by: INTERNAL MEDICINE

## 2024-09-23 PROCEDURE — 3077F SYST BP >= 140 MM HG: CPT | Performed by: INTERNAL MEDICINE

## 2024-09-23 PROCEDURE — 3078F DIAST BP <80 MM HG: CPT | Performed by: INTERNAL MEDICINE

## 2024-09-23 RX ORDER — LEVALBUTEROL INHALATION SOLUTION 1.25 MG/3ML
1.25 SOLUTION RESPIRATORY (INHALATION) EVERY 4 HOURS PRN
Qty: 180 EACH | Refills: 5 | Status: SHIPPED | OUTPATIENT
Start: 2024-09-23

## 2024-09-23 ASSESSMENT — PATIENT HEALTH QUESTIONNAIRE - PHQ9
SUM OF ALL RESPONSES TO PHQ QUESTIONS 1-9: 3
2. FEELING DOWN, DEPRESSED OR HOPELESS: SEVERAL DAYS
SUM OF ALL RESPONSES TO PHQ QUESTIONS 1-9: 3
1. LITTLE INTEREST OR PLEASURE IN DOING THINGS: MORE THAN HALF THE DAYS
SUM OF ALL RESPONSES TO PHQ9 QUESTIONS 1 & 2: 3

## 2024-09-23 ASSESSMENT — SLEEP AND FATIGUE QUESTIONNAIRES
HOW LIKELY ARE YOU TO NOD OFF OR FALL ASLEEP IN A CAR, WHILE STOPPED FOR A FEW MINUTES IN TRAFFIC: WOULD NEVER DOZE
ESS TOTAL SCORE: 7
HOW LIKELY ARE YOU TO NOD OFF OR FALL ASLEEP WHILE SITTING INACTIVE IN A PUBLIC PLACE: WOULD NEVER DOZE
HOW LIKELY ARE YOU TO NOD OFF OR FALL ASLEEP WHEN YOU ARE A PASSENGER IN A CAR FOR AN HOUR WITHOUT A BREAK: SLIGHT CHANCE OF DOZING
HOW LIKELY ARE YOU TO NOD OFF OR FALL ASLEEP WHILE SITTING AND READING: SLIGHT CHANCE OF DOZING
HOW LIKELY ARE YOU TO NOD OFF OR FALL ASLEEP WHILE WATCHING TV: SLIGHT CHANCE OF DOZING
HOW LIKELY ARE YOU TO NOD OFF OR FALL ASLEEP WHILE SITTING QUIETLY AFTER LUNCH WITHOUT ALCOHOL: SLIGHT CHANCE OF DOZING
HOW LIKELY ARE YOU TO NOD OFF OR FALL ASLEEP WHILE LYING DOWN TO REST IN THE AFTERNOON WHEN CIRCUMSTANCES PERMIT: HIGH CHANCE OF DOZING
HOW LIKELY ARE YOU TO NOD OFF OR FALL ASLEEP WHILE SITTING AND TALKING TO SOMEONE: WOULD NEVER DOZE

## 2024-10-09 ENCOUNTER — HOSPITAL ENCOUNTER (OUTPATIENT)
Facility: HOSPITAL | Age: 64
Setting detail: INFUSION SERIES
Discharge: HOME | End: 2024-10-12
Payer: MEDICARE

## 2024-10-09 VITALS
RESPIRATION RATE: 18 BRPM | TEMPERATURE: 97.5 F | SYSTOLIC BLOOD PRESSURE: 174 MMHG | DIASTOLIC BLOOD PRESSURE: 82 MMHG | OXYGEN SATURATION: 94 % | HEART RATE: 78 BPM

## 2024-10-09 DIAGNOSIS — J45.51 SEVERE PERSISTENT ASTHMA WITH EXACERBATION: Primary | ICD-10-CM

## 2024-10-09 PROCEDURE — 96372 THER/PROPH/DIAG INJ SC/IM: CPT

## 2024-10-09 PROCEDURE — 6360000002 HC RX W HCPCS: Performed by: INTERNAL MEDICINE

## 2024-10-09 RX ORDER — ALBUTEROL SULFATE 90 UG/1
4 INHALANT RESPIRATORY (INHALATION) PRN
OUTPATIENT
Start: 2024-12-04

## 2024-10-09 RX ORDER — ACETAMINOPHEN 325 MG/1
650 TABLET ORAL
OUTPATIENT
Start: 2024-12-04

## 2024-10-09 RX ORDER — EPINEPHRINE 1 MG/ML
0.3 INJECTION, SOLUTION, CONCENTRATE INTRAVENOUS PRN
OUTPATIENT
Start: 2024-12-04

## 2024-10-09 RX ORDER — ONDANSETRON 2 MG/ML
8 INJECTION INTRAMUSCULAR; INTRAVENOUS
OUTPATIENT
Start: 2024-12-04

## 2024-10-09 RX ORDER — DIPHENHYDRAMINE HYDROCHLORIDE 50 MG/ML
50 INJECTION INTRAMUSCULAR; INTRAVENOUS
OUTPATIENT
Start: 2024-12-04

## 2024-10-09 RX ORDER — SODIUM CHLORIDE 9 MG/ML
INJECTION, SOLUTION INTRAVENOUS CONTINUOUS
OUTPATIENT
Start: 2024-12-04

## 2024-10-09 RX ADMIN — BENRALIZUMAB 30 MG: 30 INJECTION, SOLUTION SUBCUTANEOUS at 14:20

## 2024-10-09 NOTE — PROGRESS NOTES
[facility] Progress Note    Date: 2024    Name: Olayinka Jones    MRN: [id number]         : 1960    Ms. Jones arrived in the [facility] today at 1415,ambulatory with assistance of a walker, and in stable condition, here for her FASENRA INJECTION (EVERY 8 WEEKS).  She was assessed and education was provided.     Ms. Jones's vitals were reviewed.  Vitals:    10/09/24 1418   BP: (!) 174/82   Pulse: 78   Resp: 18   Temp: 97.5 °F (36.4 °C)   SpO2: 94%     Ms. Jones stated that she was doing fairly well, and was tolerating the FASENRA injections well, and  she voiced no major complaints.    Benralizumab (FASENRA) 30 mg, was administered SQ, in the back of her RIGHT ARM per order and without incident.     Ms. Jones tolerated the injection well and voiced no complaints.     Discharge instructions reviewed with patient and she expressed understanding. Her armband was removed and shredded.     Ms. [facility] was [facility], ambulatory and with assistance of a walker, and in stable condition at 1425.     She is to return in 8 weeks, on Wednesday, 24 at 1400, for her next appointment, for her next FASENRA Injection.       RYAN SOLIZ RN  2024  2:27 PM

## 2024-10-11 ENCOUNTER — TELEPHONE (OUTPATIENT)
Age: 64
End: 2024-10-11

## 2024-10-11 NOTE — TELEPHONE ENCOUNTER
Jasmyn needs last office notes for further information please call [phone] or [phone]. PPWK is also being faxed over on both fax machines for [employer] to sign and fax back over

## 2024-10-11 NOTE — TELEPHONE ENCOUNTER
Sent Last office note to Zena from [facility] to fax# [phone]; telephone# [phone]; also will wait for form that comes in the fax and have Dr LAURENT sign it and we will fax it.

## 2024-11-18 ENCOUNTER — HOSPITAL ENCOUNTER (INPATIENT)
Facility: HOSPITAL | Age: 64
LOS: 3 days | Discharge: HOME | DRG: 202 | End: 2024-11-21
Attending: EMERGENCY MEDICINE | Admitting: FAMILY MEDICINE
Payer: MEDICARE

## 2024-11-18 ENCOUNTER — APPOINTMENT (OUTPATIENT)
Facility: HOSPITAL | Age: 64
DRG: 202 | End: 2024-11-18
Payer: MEDICARE

## 2024-11-18 DIAGNOSIS — J44.1 COPD EXACERBATION (HCC): Primary | ICD-10-CM

## 2024-11-18 PROBLEM — J44.89 COPD WITH ASTHMA (HCC): Status: ACTIVE | Noted: 2024-11-18

## 2024-11-18 LAB
ALBUMIN SERPL-MCNC: 3.7 G/DL (ref 3.4–5)
ALBUMIN/GLOB SERPL: 1.1 (ref 0.8–1.7)
ALP SERPL-CCNC: 97 U/L (ref 45–117)
ALT SERPL-CCNC: 33 U/L (ref 13–56)
ANION GAP BLD CALC-SCNC: ABNORMAL MMOL/L (ref 10–20)
ANION GAP SERPL CALC-SCNC: 3 MMOL/L (ref 3–18)
ARTERIAL PATENCY WRIST A: POSITIVE
AST SERPL-CCNC: 17 U/L (ref 10–38)
BASE EXCESS BLD CALC-SCNC: 1 MMOL/L
BASOPHILS # BLD: 0 K/UL (ref 0–0.1)
BASOPHILS NFR BLD: 0 % (ref 0–2)
BDY SITE: ABNORMAL
BILIRUB SERPL-MCNC: 0.8 MG/DL (ref 0.2–1)
BODY TEMPERATURE: 97.9
BUN SERPL-MCNC: 15 MG/DL (ref 7–18)
BUN/CREAT SERPL: 17 (ref 12–20)
CA-I BLD-MCNC: 1.35 MMOL/L (ref 1.15–1.33)
CALCIUM SERPL-MCNC: 9.9 MG/DL (ref 8.5–10.1)
CHLORIDE BLD-SCNC: 102 MMOL/L (ref 98–107)
CHLORIDE SERPL-SCNC: 106 MMOL/L (ref 100–111)
CO2 BLD-SCNC: 26 MMOL/L (ref 22–29)
CO2 SERPL-SCNC: 30 MMOL/L (ref 21–32)
CREAT BLD-MCNC: 0.75 MG/DL (ref 0.6–1.3)
CREAT SERPL-MCNC: 0.89 MG/DL (ref 0.6–1.3)
D DIMER PPP FEU-MCNC: 0.32 UG/ML(FEU)
DIFFERENTIAL METHOD BLD: ABNORMAL
EKG ATRIAL RATE: 75 BPM
EKG DIAGNOSIS: NORMAL
EKG P AXIS: 6 DEGREES
EKG P-R INTERVAL: 140 MS
EKG Q-T INTERVAL: 420 MS
EKG QRS DURATION: 126 MS
EKG QTC CALCULATION (BAZETT): 469 MS
EKG R AXIS: 8 DEGREES
EKG T AXIS: 61 DEGREES
EKG VENTRICULAR RATE: 75 BPM
EOSINOPHIL # BLD: 0 K/UL (ref 0–0.4)
EOSINOPHIL NFR BLD: 0 % (ref 0–5)
ERYTHROCYTE [DISTWIDTH] IN BLOOD BY AUTOMATED COUNT: 13.6 % (ref 11.6–14.5)
FIO2 ON VENT: 28 %
FLUAV RNA SPEC QL NAA+PROBE: NOT DETECTED
FLUBV RNA SPEC QL NAA+PROBE: NOT DETECTED
GAS FLOW.O2 O2 DELIVERY SYS: ABNORMAL
GLOBULIN SER CALC-MCNC: 3.4 G/DL (ref 2–4)
GLUCOSE BLD STRIP.AUTO-MCNC: 318 MG/DL (ref 70–110)
GLUCOSE BLD-MCNC: 175 MG/DL (ref 74–99)
GLUCOSE SERPL-MCNC: 170 MG/DL (ref 74–99)
HCO3 BLD-SCNC: 26.5 MMOL/L (ref 21–28)
HCT VFR BLD AUTO: 40.7 % (ref 35–45)
HGB BLD-MCNC: 13.6 G/DL (ref 12–16)
IMM GRANULOCYTES # BLD AUTO: 0 K/UL (ref 0–0.04)
IMM GRANULOCYTES NFR BLD AUTO: 1 % (ref 0–0.5)
LACTATE BLD-SCNC: 1.55 MMOL/L (ref 0.4–2)
LYMPHOCYTES # BLD: 1.8 K/UL (ref 0.9–3.6)
LYMPHOCYTES NFR BLD: 30 % (ref 21–52)
MCH RBC QN AUTO: 29.7 PG (ref 24–34)
MCHC RBC AUTO-ENTMCNC: 33.4 G/DL (ref 31–37)
MCV RBC AUTO: 88.9 FL (ref 78–100)
MONOCYTES # BLD: 0.4 K/UL (ref 0.05–1.2)
MONOCYTES NFR BLD: 8 % (ref 3–10)
NEUTS SEG # BLD: 3.6 K/UL (ref 1.8–8)
NEUTS SEG NFR BLD: 61 % (ref 40–73)
NRBC # BLD: 0 K/UL (ref 0–0.01)
NRBC BLD-RTO: 0 PER 100 WBC
PCO2 BLD: 43.5 MMHG (ref 35–48)
PH BLD: 7.39 (ref 7.35–7.45)
PLATELET # BLD AUTO: 308 K/UL (ref 135–420)
PMV BLD AUTO: 8.6 FL (ref 9.2–11.8)
PO2 BLD: 95 MMHG (ref 83–108)
POTASSIUM BLD-SCNC: 4.5 MMOL/L (ref 3.5–5.1)
POTASSIUM SERPL-SCNC: 4.4 MMOL/L (ref 3.5–5.5)
PROCALCITONIN SERPL-MCNC: 0.07 NG/ML
PROT SERPL-MCNC: 7.1 G/DL (ref 6.4–8.2)
RBC # BLD AUTO: 4.58 M/UL (ref 4.2–5.3)
RESPIRATORY RATE, POC: 15 (ref 5–40)
SAO2 % BLD: 97 % (ref 94–98)
SARS-COV-2 RNA RESP QL NAA+PROBE: NOT DETECTED
SERVICE CMNT-IMP: ABNORMAL
SODIUM BLD-SCNC: 141 MMOL/L (ref 136–145)
SODIUM SERPL-SCNC: 139 MMOL/L (ref 136–145)
SOURCE: NORMAL
SPECIMEN SITE: ABNORMAL
TROPONIN I SERPL HS-MCNC: 12 NG/L (ref 0–54)
WBC # BLD AUTO: 5.9 K/UL (ref 4.6–13.2)

## 2024-11-18 PROCEDURE — 2580000003 HC RX 258

## 2024-11-18 PROCEDURE — 84132 ASSAY OF SERUM POTASSIUM: CPT

## 2024-11-18 PROCEDURE — 82803 BLOOD GASES ANY COMBINATION: CPT

## 2024-11-18 PROCEDURE — 6360000002 HC RX W HCPCS

## 2024-11-18 PROCEDURE — 71045 X-RAY EXAM CHEST 1 VIEW: CPT

## 2024-11-18 PROCEDURE — 96374 THER/PROPH/DIAG INJ IV PUSH: CPT

## 2024-11-18 PROCEDURE — 85014 HEMATOCRIT: CPT

## 2024-11-18 PROCEDURE — 84484 ASSAY OF TROPONIN QUANT: CPT

## 2024-11-18 PROCEDURE — 84145 PROCALCITONIN (PCT): CPT

## 2024-11-18 PROCEDURE — 83605 ASSAY OF LACTIC ACID: CPT

## 2024-11-18 PROCEDURE — 6370000000 HC RX 637 (ALT 250 FOR IP)

## 2024-11-18 PROCEDURE — 82947 ASSAY GLUCOSE BLOOD QUANT: CPT

## 2024-11-18 PROCEDURE — 99285 EMERGENCY DEPT VISIT HI MDM: CPT

## 2024-11-18 PROCEDURE — 2700000000 HC OXYGEN THERAPY PER DAY

## 2024-11-18 PROCEDURE — 80053 COMPREHEN METABOLIC PANEL: CPT

## 2024-11-18 PROCEDURE — 82962 GLUCOSE BLOOD TEST: CPT

## 2024-11-18 PROCEDURE — 85025 COMPLETE CBC W/AUTO DIFF WBC: CPT

## 2024-11-18 PROCEDURE — 93005 ELECTROCARDIOGRAM TRACING: CPT | Performed by: EMERGENCY MEDICINE

## 2024-11-18 PROCEDURE — 87636 SARSCOV2 & INF A&B AMP PRB: CPT

## 2024-11-18 PROCEDURE — 85379 FIBRIN DEGRADATION QUANT: CPT

## 2024-11-18 PROCEDURE — 82330 ASSAY OF CALCIUM: CPT

## 2024-11-18 PROCEDURE — 94761 N-INVAS EAR/PLS OXIMETRY MLT: CPT

## 2024-11-18 PROCEDURE — 1100000003 HC PRIVATE W/ TELEMETRY

## 2024-11-18 PROCEDURE — 94640 AIRWAY INHALATION TREATMENT: CPT

## 2024-11-18 PROCEDURE — 84295 ASSAY OF SERUM SODIUM: CPT

## 2024-11-18 PROCEDURE — 36600 WITHDRAWAL OF ARTERIAL BLOOD: CPT

## 2024-11-18 PROCEDURE — 93010 ELECTROCARDIOGRAM REPORT: CPT | Performed by: INTERNAL MEDICINE

## 2024-11-18 RX ORDER — MAGNESIUM SULFATE IN WATER 40 MG/ML
2000 INJECTION, SOLUTION INTRAVENOUS ONCE
Status: COMPLETED | OUTPATIENT
Start: 2024-11-18 | End: 2024-11-19

## 2024-11-18 RX ORDER — INSULIN GLARGINE 100 [IU]/ML
25 INJECTION, SOLUTION SUBCUTANEOUS 2 TIMES DAILY
Status: DISCONTINUED | OUTPATIENT
Start: 2024-11-18 | End: 2024-11-19

## 2024-11-18 RX ORDER — SODIUM CHLORIDE 0.9 % (FLUSH) 0.9 %
5-40 SYRINGE (ML) INJECTION PRN
Status: DISCONTINUED | OUTPATIENT
Start: 2024-11-18 | End: 2024-11-21 | Stop reason: HOSPADM

## 2024-11-18 RX ORDER — SODIUM CHLORIDE 0.9 % (FLUSH) 0.9 %
5-40 SYRINGE (ML) INJECTION EVERY 12 HOURS SCHEDULED
Status: DISCONTINUED | OUTPATIENT
Start: 2024-11-18 | End: 2024-11-21 | Stop reason: HOSPADM

## 2024-11-18 RX ORDER — ACETAMINOPHEN 650 MG/1
650 SUPPOSITORY RECTAL EVERY 6 HOURS PRN
Status: DISCONTINUED | OUTPATIENT
Start: 2024-11-18 | End: 2024-11-21 | Stop reason: HOSPADM

## 2024-11-18 RX ORDER — INSULIN LISPRO 100 [IU]/ML
0-8 INJECTION, SOLUTION INTRAVENOUS; SUBCUTANEOUS
Status: DISCONTINUED | OUTPATIENT
Start: 2024-11-18 | End: 2024-11-19

## 2024-11-18 RX ORDER — PANTOPRAZOLE SODIUM 40 MG/1
40 TABLET, DELAYED RELEASE ORAL
Status: DISCONTINUED | OUTPATIENT
Start: 2024-11-19 | End: 2024-11-21 | Stop reason: HOSPADM

## 2024-11-18 RX ORDER — LEVOFLOXACIN 5 MG/ML
500 INJECTION, SOLUTION INTRAVENOUS
Status: DISCONTINUED | OUTPATIENT
Start: 2024-11-18 | End: 2024-11-19

## 2024-11-18 RX ORDER — ALBUTEROL SULFATE 0.83 MG/ML
2.5 SOLUTION RESPIRATORY (INHALATION)
Status: DISCONTINUED | OUTPATIENT
Start: 2024-11-18 | End: 2024-11-19

## 2024-11-18 RX ORDER — DEXTROSE MONOHYDRATE 100 MG/ML
INJECTION, SOLUTION INTRAVENOUS CONTINUOUS PRN
Status: DISCONTINUED | OUTPATIENT
Start: 2024-11-18 | End: 2024-11-21 | Stop reason: HOSPADM

## 2024-11-18 RX ORDER — POTASSIUM CHLORIDE 7.45 MG/ML
10 INJECTION INTRAVENOUS PRN
Status: DISCONTINUED | OUTPATIENT
Start: 2024-11-18 | End: 2024-11-21 | Stop reason: HOSPADM

## 2024-11-18 RX ORDER — MONTELUKAST SODIUM 10 MG/1
10 TABLET ORAL NIGHTLY
Status: DISCONTINUED | OUTPATIENT
Start: 2024-11-18 | End: 2024-11-21 | Stop reason: HOSPADM

## 2024-11-18 RX ORDER — GLUCAGON 1 MG
1 KIT INJECTION PRN
Status: DISCONTINUED | OUTPATIENT
Start: 2024-11-18 | End: 2024-11-21 | Stop reason: HOSPADM

## 2024-11-18 RX ORDER — INSULIN LISPRO 100 [IU]/ML
0-4 INJECTION, SOLUTION INTRAVENOUS; SUBCUTANEOUS
Status: DISCONTINUED | OUTPATIENT
Start: 2024-11-18 | End: 2024-11-18

## 2024-11-18 RX ORDER — ONDANSETRON 2 MG/ML
4 INJECTION INTRAMUSCULAR; INTRAVENOUS EVERY 6 HOURS PRN
Status: DISCONTINUED | OUTPATIENT
Start: 2024-11-18 | End: 2024-11-21 | Stop reason: HOSPADM

## 2024-11-18 RX ORDER — SODIUM CHLORIDE 9 MG/ML
INJECTION, SOLUTION INTRAVENOUS PRN
Status: DISCONTINUED | OUTPATIENT
Start: 2024-11-18 | End: 2024-11-21 | Stop reason: HOSPADM

## 2024-11-18 RX ORDER — ONDANSETRON 4 MG/1
4 TABLET, ORALLY DISINTEGRATING ORAL EVERY 8 HOURS PRN
Status: DISCONTINUED | OUTPATIENT
Start: 2024-11-18 | End: 2024-11-21 | Stop reason: HOSPADM

## 2024-11-18 RX ORDER — ENOXAPARIN SODIUM 100 MG/ML
30 INJECTION SUBCUTANEOUS 2 TIMES DAILY
Status: DISCONTINUED | OUTPATIENT
Start: 2024-11-18 | End: 2024-11-21 | Stop reason: HOSPADM

## 2024-11-18 RX ORDER — POLYETHYLENE GLYCOL 3350 17 G/17G
17 POWDER, FOR SOLUTION ORAL DAILY PRN
Status: DISCONTINUED | OUTPATIENT
Start: 2024-11-18 | End: 2024-11-21 | Stop reason: HOSPADM

## 2024-11-18 RX ORDER — IPRATROPIUM BROMIDE AND ALBUTEROL SULFATE 2.5; .5 MG/3ML; MG/3ML
1 SOLUTION RESPIRATORY (INHALATION)
Status: COMPLETED | OUTPATIENT
Start: 2024-11-18 | End: 2024-11-18

## 2024-11-18 RX ORDER — ACETAMINOPHEN 325 MG/1
650 TABLET ORAL EVERY 6 HOURS PRN
Status: DISCONTINUED | OUTPATIENT
Start: 2024-11-18 | End: 2024-11-21 | Stop reason: HOSPADM

## 2024-11-18 RX ORDER — POTASSIUM CHLORIDE 1500 MG/1
40 TABLET, EXTENDED RELEASE ORAL PRN
Status: DISCONTINUED | OUTPATIENT
Start: 2024-11-18 | End: 2024-11-21 | Stop reason: HOSPADM

## 2024-11-18 RX ORDER — MAGNESIUM SULFATE IN WATER 40 MG/ML
2000 INJECTION, SOLUTION INTRAVENOUS PRN
Status: DISCONTINUED | OUTPATIENT
Start: 2024-11-18 | End: 2024-11-21 | Stop reason: HOSPADM

## 2024-11-18 RX ORDER — DOXYCYCLINE 100 MG/1
100 CAPSULE ORAL 2 TIMES DAILY
Status: ON HOLD | COMMUNITY
Start: 2024-11-15 | End: 2024-11-21 | Stop reason: HOSPADM

## 2024-11-18 RX ORDER — LISINOPRIL 20 MG/1
20 TABLET ORAL DAILY
Status: DISCONTINUED | OUTPATIENT
Start: 2024-11-18 | End: 2024-11-19

## 2024-11-18 RX ORDER — ONDANSETRON 4 MG/1
4 TABLET, ORALLY DISINTEGRATING ORAL
Status: COMPLETED | OUTPATIENT
Start: 2024-11-18 | End: 2024-11-18

## 2024-11-18 RX ORDER — ALBUTEROL SULFATE 0.83 MG/ML
2.5 SOLUTION RESPIRATORY (INHALATION) EVERY 4 HOURS PRN
Status: DISCONTINUED | OUTPATIENT
Start: 2024-11-18 | End: 2024-11-18

## 2024-11-18 RX ORDER — FUROSEMIDE 40 MG/1
40 TABLET ORAL 2 TIMES DAILY
Status: DISCONTINUED | OUTPATIENT
Start: 2024-11-18 | End: 2024-11-21 | Stop reason: HOSPADM

## 2024-11-18 RX ORDER — BUDESONIDE 1 MG/2ML
1 INHALANT ORAL DAILY
Status: DISCONTINUED | OUTPATIENT
Start: 2024-11-18 | End: 2024-11-18 | Stop reason: SDUPTHER

## 2024-11-18 RX ORDER — PANTOPRAZOLE SODIUM 40 MG/1
40 TABLET, DELAYED RELEASE ORAL
Status: DISCONTINUED | OUTPATIENT
Start: 2024-11-19 | End: 2024-11-18

## 2024-11-18 RX ADMIN — ACETAMINOPHEN 325MG 650 MG: 325 TABLET ORAL at 23:35

## 2024-11-18 RX ADMIN — INSULIN LISPRO 6 UNITS: 100 INJECTION, SOLUTION INTRAVENOUS; SUBCUTANEOUS at 23:23

## 2024-11-18 RX ADMIN — MAGNESIUM SULFATE HEPTAHYDRATE 2000 MG: 40 INJECTION, SOLUTION INTRAVENOUS at 23:24

## 2024-11-18 RX ADMIN — MONTELUKAST 10 MG: 10 TABLET, FILM COATED ORAL at 23:22

## 2024-11-18 RX ADMIN — INSULIN GLARGINE 25 UNITS: 100 INJECTION, SOLUTION SUBCUTANEOUS at 23:22

## 2024-11-18 RX ADMIN — IPRATROPIUM BROMIDE 0.5 MG: 0.5 SOLUTION RESPIRATORY (INHALATION) at 19:59

## 2024-11-18 RX ADMIN — SODIUM CHLORIDE, PRESERVATIVE FREE 10 ML: 5 INJECTION INTRAVENOUS at 23:25

## 2024-11-18 RX ADMIN — ALBUTEROL SULFATE 2.5 MG: 2.5 SOLUTION RESPIRATORY (INHALATION) at 19:59

## 2024-11-18 RX ADMIN — IPRATROPIUM BROMIDE AND ALBUTEROL SULFATE 1 DOSE: .5; 3 SOLUTION RESPIRATORY (INHALATION) at 12:35

## 2024-11-18 RX ADMIN — WATER 125 MG: 1 INJECTION INTRAMUSCULAR; INTRAVENOUS; SUBCUTANEOUS at 12:29

## 2024-11-18 RX ADMIN — ARFORMOTEROL TARTRATE: 15 SOLUTION RESPIRATORY (INHALATION) at 19:59

## 2024-11-18 RX ADMIN — ONDANSETRON 4 MG: 4 TABLET, ORALLY DISINTEGRATING ORAL at 12:30

## 2024-11-18 RX ADMIN — ENOXAPARIN SODIUM 30 MG: 100 INJECTION SUBCUTANEOUS at 23:23

## 2024-11-18 ASSESSMENT — PAIN DESCRIPTION - LOCATION: LOCATION: HEAD

## 2024-11-18 ASSESSMENT — PAIN DESCRIPTION - DESCRIPTORS: DESCRIPTORS: ACHING

## 2024-11-18 ASSESSMENT — PAIN DESCRIPTION - ONSET: ONSET: GRADUAL

## 2024-11-18 ASSESSMENT — PAIN - FUNCTIONAL ASSESSMENT: PAIN_FUNCTIONAL_ASSESSMENT: ACTIVITIES ARE NOT PREVENTED

## 2024-11-18 ASSESSMENT — PAIN SCALES - GENERAL: PAINLEVEL_OUTOF10: 5

## 2024-11-18 ASSESSMENT — PAIN DESCRIPTION - FREQUENCY: FREQUENCY: INTERMITTENT

## 2024-11-18 ASSESSMENT — PAIN DESCRIPTION - PAIN TYPE: TYPE: ACUTE PAIN

## 2024-11-18 NOTE — ED TRIAGE NOTES
Pt in ED with c/o a SOB. Pt states she was seen at her PCP and started on antibiotics on Friday. Pt states the SOB has gotten worse. Pt currently on 2L NC at this time

## 2024-11-18 NOTE — H&P
170 (H) 74 - 99 mg/dL    BUN 15 7.0 - 18 MG/DL    Creatinine 0.89 0.6 - 1.3 MG/DL    BUN/Creatinine Ratio 17 12 - 20      Est, Glosherry Filt Rate 72 >60 ml/min/1.73m2    Calcium 9.9 8.5 - 10.1 MG/DL    Total Bilirubin 0.8 0.2 - 1.0 MG/DL    ALT 33 13 - 56 U/L    AST 17 10 - 38 U/L    Alk Phosphatase 97 45 - 117 U/L    Total Protein 7.1 6.4 - 8.2 g/dL    Albumin 3.7 3.4 - 5.0 g/dL    Globulin 3.4 2.0 - 4.0 g/dL    Albumin/Globulin Ratio 1.1 0.8 - 1.7     Troponin    Collection Time: 11/18/24 11:50 AM   Result Value Ref Range    Troponin, High Sensitivity 12 0 - 54 ng/L   D-Dimer, Quantitative    Collection Time: 11/18/24 11:50 AM   Result Value Ref Range    D-Dimer, Quant 0.32 <0.46 ug/ml(FEU)   EKG 12 Lead (SOB)    Collection Time: 11/18/24 11:57 AM   Result Value Ref Range    Ventricular Rate 75 BPM    Atrial Rate 75 BPM    P-R Interval 140 ms    QRS Duration 126 ms    Q-T Interval 420 ms    QTc Calculation (Bazett) 469 ms    P Axis 6 degrees    R Axis 8 degrees    T Axis 61 degrees    Diagnosis       Normal sinus rhythm  Right bundle branch block  Abnormal ECG  When compared with ECG of 27-FEB-2024 22:50,  QRS axis shifted left  T wave inversion no longer evident in Inferior leads  T wave inversion no longer evident in Anterior leads  Confirmed by MD Nurys, Tai ([id number]) on 11/18/2024 4:47:01 PM     COVID-19 & Influenza Combo    Collection Time: 11/18/24 12:05 PM    Specimen: Nasopharyngeal   Result Value Ref Range    Source Nasopharyngeal      SARS-CoV-2, PCR Not detected NOTD      Rapid Influenza A By PCR Not detected NOTD      Rapid Influenza B By PCR Not detected NOTD     POCT Blood Gas & Electrolytes    Collection Time: 11/18/24 12:11 PM   Result Value Ref Range    POC pH 7.39 7.35 - 7.45      POC pCO2 43.5 35.0 - 48.0 MMHG    POC PO2 95 83 - 108 MMHG    POC Ionized Calcium 1.35 (H) 1.15 - 1.33 mmol/L    Base Excess 1.0 mmol/L    POC HCO3 26.5 21 - 28 MMOL/L    POC TCO2 26 22 - 29 MMOL/L    POC O2 SAT 97 94 -

## 2024-11-18 NOTE — ED PROVIDER NOTES
MCHC 33.4 31.0 - 37.0 g/dL    RDW 13.6 11.6 - 14.5 %    Platelets 308 135 - 420 K/uL    MPV 8.6 (L) 9.2 - 11.8 FL    Nucleated RBCs 0.0 0  WBC    nRBC 0.00 0.00 - 0.01 K/uL    Neutrophils % 61 40 - 73 %    Lymphocytes % 30 21 - 52 %    Monocytes % 8 3 - 10 %    Eosinophils % 0 0 - 5 %    Basophils % 0 0 - 2 %    Immature Granulocytes % 1 (H) 0.0 - 0.5 %    Neutrophils Absolute 3.6 1.8 - 8.0 K/UL    Lymphocytes Absolute 1.8 0.9 - 3.6 K/UL    Monocytes Absolute 0.4 0.05 - 1.2 K/UL    Eosinophils Absolute 0.0 0.0 - 0.4 K/UL    Basophils Absolute 0.0 0.0 - 0.1 K/UL    Immature Granulocytes Absolute 0.0 0.00 - 0.04 K/UL    Differential Type AUTOMATED     CMP    Collection Time: 11/18/24 11:50 AM   Result Value Ref Range    Sodium 139 136 - 145 mmol/L    Potassium 4.4 3.5 - 5.5 mmol/L    Chloride 106 100 - 111 mmol/L    CO2 30 21 - 32 mmol/L    Anion Gap 3 3.0 - 18 mmol/L    Glucose 170 (H) 74 - 99 mg/dL    BUN 15 7.0 - 18 MG/DL    Creatinine 0.89 0.6 - 1.3 MG/DL    BUN/Creatinine Ratio 17 12 - 20      Est, Glom Filt Rate 72 >60 ml/min/1.73m2    Calcium 9.9 8.5 - 10.1 MG/DL    Total Bilirubin 0.8 0.2 - 1.0 MG/DL    ALT 33 13 - 56 U/L    AST 17 10 - 38 U/L    Alk Phosphatase 97 45 - 117 U/L    Total Protein 7.1 6.4 - 8.2 g/dL    Albumin 3.7 3.4 - 5.0 g/dL    Globulin 3.4 2.0 - 4.0 g/dL    Albumin/Globulin Ratio 1.1 0.8 - 1.7     Troponin    Collection Time: 11/18/24 11:50 AM   Result Value Ref Range    Troponin, High Sensitivity 12 0 - 54 ng/L   D-Dimer, Quantitative    Collection Time: 11/18/24 11:50 AM   Result Value Ref Range    D-Dimer, Quant 0.32 <0.46 ug/ml(FEU)   EKG 12 Lead (SOB)    Collection Time: 11/18/24 11:57 AM   Result Value Ref Range    Ventricular Rate 75 BPM    Atrial Rate 75 BPM    P-R Interval 140 ms    QRS Duration 126 ms    Q-T Interval 420 ms    QTc Calculation (Bazett) 469 ms    P Axis 6 degrees    R Axis 8 degrees    T Axis 61 degrees    Diagnosis       Normal sinus rhythm  Right bundle

## 2024-11-18 NOTE — ED NOTES
TRANSFER - OUT REPORT:    Verbal report given to JOEY Brady on Olayinka Jones  being transferred to [facility] for routine progression of patient care       Report consisted of patient's Situation, Background, Assessment and   Recommendations(SBAR).     Information from the following report(s) ED SBAR was reviewed with the receiving nurse.    Kinder Fall Assessment:                           Lines:   Peripheral IV 11/18/24 Right Antecubital (Active)   Site Assessment Clean, dry & intact 11/18/24 1150   Line Status Blood return noted 11/18/24 1150   Line Care Chlorhexidine wipes 11/18/24 1150   Phlebitis Assessment No symptoms 11/18/24 1150   Infiltration Assessment 0 11/18/24 1150   Dressing Status New dressing applied 11/18/24 1150   Dressing Type Transparent 11/18/24 1150   Dressing Intervention New 11/18/24 1150        Opportunity for questions and clarification was provided.      Patient transported with:  Tech

## 2024-11-19 LAB
ANION GAP SERPL CALC-SCNC: 7 MMOL/L (ref 3–18)
B PERT DNA SPEC QL NAA+PROBE: NOT DETECTED
BASOPHILS # BLD: 0 K/UL (ref 0–0.1)
BASOPHILS NFR BLD: 0 % (ref 0–2)
BORDETELLA PARAPERTUSSIS BY PCR: NOT DETECTED
BUN SERPL-MCNC: 18 MG/DL (ref 7–18)
BUN/CREAT SERPL: 18 (ref 12–20)
C PNEUM DNA SPEC QL NAA+PROBE: NOT DETECTED
CALCIUM SERPL-MCNC: 9.7 MG/DL (ref 8.5–10.1)
CHLORIDE SERPL-SCNC: 106 MMOL/L (ref 100–111)
CO2 SERPL-SCNC: 25 MMOL/L (ref 21–32)
CREAT SERPL-MCNC: 0.98 MG/DL (ref 0.6–1.3)
DIFFERENTIAL METHOD BLD: ABNORMAL
EOSINOPHIL # BLD: 0 K/UL (ref 0–0.4)
EOSINOPHIL NFR BLD: 0 % (ref 0–5)
ERYTHROCYTE [DISTWIDTH] IN BLOOD BY AUTOMATED COUNT: 13.7 % (ref 11.6–14.5)
FLUAV SUBTYP SPEC NAA+PROBE: NOT DETECTED
FLUBV RNA SPEC QL NAA+PROBE: NOT DETECTED
GLUCOSE BLD STRIP.AUTO-MCNC: 235 MG/DL (ref 70–110)
GLUCOSE BLD STRIP.AUTO-MCNC: 258 MG/DL (ref 70–110)
GLUCOSE BLD STRIP.AUTO-MCNC: 281 MG/DL (ref 70–110)
GLUCOSE BLD STRIP.AUTO-MCNC: 320 MG/DL (ref 70–110)
GLUCOSE SERPL-MCNC: 231 MG/DL (ref 74–99)
HADV DNA SPEC QL NAA+PROBE: NOT DETECTED
HCOV 229E RNA SPEC QL NAA+PROBE: NOT DETECTED
HCOV HKU1 RNA SPEC QL NAA+PROBE: NOT DETECTED
HCOV NL63 RNA SPEC QL NAA+PROBE: NOT DETECTED
HCOV OC43 RNA SPEC QL NAA+PROBE: NOT DETECTED
HCT VFR BLD AUTO: 36.7 % (ref 35–45)
HGB BLD-MCNC: 12.2 G/DL (ref 12–16)
HMPV RNA SPEC QL NAA+PROBE: NOT DETECTED
HPIV1 RNA SPEC QL NAA+PROBE: NOT DETECTED
HPIV2 RNA SPEC QL NAA+PROBE: NOT DETECTED
HPIV3 RNA SPEC QL NAA+PROBE: NOT DETECTED
HPIV4 RNA SPEC QL NAA+PROBE: NOT DETECTED
IMM GRANULOCYTES # BLD AUTO: 0.1 K/UL (ref 0–0.04)
IMM GRANULOCYTES NFR BLD AUTO: 1 % (ref 0–0.5)
LYMPHOCYTES # BLD: 1 K/UL (ref 0.9–3.6)
LYMPHOCYTES NFR BLD: 10 % (ref 21–52)
M PNEUMO DNA SPEC QL NAA+PROBE: NOT DETECTED
MCH RBC QN AUTO: 29.7 PG (ref 24–34)
MCHC RBC AUTO-ENTMCNC: 33.2 G/DL (ref 31–37)
MCV RBC AUTO: 89.3 FL (ref 78–100)
MONOCYTES # BLD: 0.4 K/UL (ref 0.05–1.2)
MONOCYTES NFR BLD: 4 % (ref 3–10)
NEUTS SEG # BLD: 7.8 K/UL (ref 1.8–8)
NEUTS SEG NFR BLD: 85 % (ref 40–73)
NRBC # BLD: 0 K/UL (ref 0–0.01)
NRBC BLD-RTO: 0 PER 100 WBC
PLATELET # BLD AUTO: 318 K/UL (ref 135–420)
PMV BLD AUTO: 9.1 FL (ref 9.2–11.8)
POTASSIUM SERPL-SCNC: 4.4 MMOL/L (ref 3.5–5.5)
RBC # BLD AUTO: 4.11 M/UL (ref 4.2–5.3)
RSV RNA SPEC QL NAA+PROBE: NOT DETECTED
RV+EV RNA SPEC QL NAA+PROBE: NOT DETECTED
SARS-COV-2 RNA RESP QL NAA+PROBE: NOT DETECTED
SODIUM SERPL-SCNC: 138 MMOL/L (ref 136–145)
WBC # BLD AUTO: 9.3 K/UL (ref 4.6–13.2)

## 2024-11-19 PROCEDURE — 6360000002 HC RX W HCPCS

## 2024-11-19 PROCEDURE — 87205 SMEAR GRAM STAIN: CPT

## 2024-11-19 PROCEDURE — 87070 CULTURE OTHR SPECIMN AEROBIC: CPT

## 2024-11-19 PROCEDURE — 6370000000 HC RX 637 (ALT 250 FOR IP)

## 2024-11-19 PROCEDURE — 82962 GLUCOSE BLOOD TEST: CPT

## 2024-11-19 PROCEDURE — 99222 1ST HOSP IP/OBS MODERATE 55: CPT | Performed by: INTERNAL MEDICINE

## 2024-11-19 PROCEDURE — 94640 AIRWAY INHALATION TREATMENT: CPT

## 2024-11-19 PROCEDURE — 2580000003 HC RX 258

## 2024-11-19 PROCEDURE — 6370000000 HC RX 637 (ALT 250 FOR IP): Performed by: FAMILY MEDICINE

## 2024-11-19 PROCEDURE — 80048 BASIC METABOLIC PNL TOTAL CA: CPT

## 2024-11-19 PROCEDURE — 0202U NFCT DS 22 TRGT SARS-COV-2: CPT

## 2024-11-19 PROCEDURE — 2700000000 HC OXYGEN THERAPY PER DAY

## 2024-11-19 PROCEDURE — 85025 COMPLETE CBC W/AUTO DIFF WBC: CPT

## 2024-11-19 PROCEDURE — 1100000003 HC PRIVATE W/ TELEMETRY

## 2024-11-19 PROCEDURE — 36415 COLL VENOUS BLD VENIPUNCTURE: CPT

## 2024-11-19 PROCEDURE — 94761 N-INVAS EAR/PLS OXIMETRY MLT: CPT

## 2024-11-19 RX ORDER — HYDRALAZINE HYDROCHLORIDE 10 MG/1
10 TABLET, FILM COATED ORAL ONCE
Status: DISCONTINUED | OUTPATIENT
Start: 2024-11-19 | End: 2024-11-21 | Stop reason: HOSPADM

## 2024-11-19 RX ORDER — IPRATROPIUM BROMIDE AND ALBUTEROL SULFATE 2.5; .5 MG/3ML; MG/3ML
1 SOLUTION RESPIRATORY (INHALATION)
Status: DISCONTINUED | OUTPATIENT
Start: 2024-11-19 | End: 2024-11-19

## 2024-11-19 RX ORDER — LISINOPRIL 40 MG/1
40 TABLET ORAL DAILY
Status: DISCONTINUED | OUTPATIENT
Start: 2024-11-20 | End: 2024-11-19

## 2024-11-19 RX ORDER — LISINOPRIL 40 MG/1
40 TABLET ORAL DAILY
Status: DISCONTINUED | OUTPATIENT
Start: 2024-11-19 | End: 2024-11-19

## 2024-11-19 RX ORDER — INSULIN LISPRO 100 [IU]/ML
5 INJECTION, SOLUTION INTRAVENOUS; SUBCUTANEOUS
Status: DISCONTINUED | OUTPATIENT
Start: 2024-11-19 | End: 2024-11-20

## 2024-11-19 RX ORDER — LEVOFLOXACIN 500 MG/1
500 TABLET, FILM COATED ORAL DAILY
Status: DISCONTINUED | OUTPATIENT
Start: 2024-11-19 | End: 2024-11-21 | Stop reason: HOSPADM

## 2024-11-19 RX ORDER — INSULIN LISPRO 100 [IU]/ML
0-16 INJECTION, SOLUTION INTRAVENOUS; SUBCUTANEOUS
Status: DISCONTINUED | OUTPATIENT
Start: 2024-11-19 | End: 2024-11-21 | Stop reason: HOSPADM

## 2024-11-19 RX ORDER — INSULIN GLARGINE 100 [IU]/ML
30 INJECTION, SOLUTION SUBCUTANEOUS 2 TIMES DAILY
Status: DISCONTINUED | OUTPATIENT
Start: 2024-11-19 | End: 2024-11-19

## 2024-11-19 RX ORDER — INSULIN GLARGINE 100 [IU]/ML
35 INJECTION, SOLUTION SUBCUTANEOUS 2 TIMES DAILY
Status: DISCONTINUED | OUTPATIENT
Start: 2024-11-19 | End: 2024-11-21 | Stop reason: HOSPADM

## 2024-11-19 RX ORDER — LISINOPRIL 20 MG/1
20 TABLET ORAL EVERY 24 HOURS
Status: DISCONTINUED | OUTPATIENT
Start: 2024-11-19 | End: 2024-11-21 | Stop reason: HOSPADM

## 2024-11-19 RX ORDER — IPRATROPIUM BROMIDE AND ALBUTEROL SULFATE 2.5; .5 MG/3ML; MG/3ML
1 SOLUTION RESPIRATORY (INHALATION) EVERY 4 HOURS PRN
Status: DISCONTINUED | OUTPATIENT
Start: 2024-11-19 | End: 2024-11-21 | Stop reason: HOSPADM

## 2024-11-19 RX ORDER — INSULIN LISPRO 100 [IU]/ML
0-8 INJECTION, SOLUTION INTRAVENOUS; SUBCUTANEOUS EVERY 24 HOURS
Status: DISCONTINUED | OUTPATIENT
Start: 2024-11-20 | End: 2024-11-21 | Stop reason: HOSPADM

## 2024-11-19 RX ORDER — LISINOPRIL 20 MG/1
20 TABLET ORAL ONCE
Status: DISCONTINUED | OUTPATIENT
Start: 2024-11-19 | End: 2024-11-19

## 2024-11-19 RX ADMIN — INSULIN LISPRO 5 UNITS: 100 INJECTION, SOLUTION INTRAVENOUS; SUBCUTANEOUS at 12:20

## 2024-11-19 RX ADMIN — INSULIN LISPRO 2 UNITS: 100 INJECTION, SOLUTION INTRAVENOUS; SUBCUTANEOUS at 09:25

## 2024-11-19 RX ADMIN — PANTOPRAZOLE SODIUM 40 MG: 40 TABLET, DELAYED RELEASE ORAL at 06:07

## 2024-11-19 RX ADMIN — PANTOPRAZOLE SODIUM 40 MG: 40 TABLET, DELAYED RELEASE ORAL at 16:10

## 2024-11-19 RX ADMIN — LEVOFLOXACIN 500 MG: 500 TABLET, FILM COATED ORAL at 12:22

## 2024-11-19 RX ADMIN — WATER 125 MG: 1 INJECTION INTRAMUSCULAR; INTRAVENOUS; SUBCUTANEOUS at 09:28

## 2024-11-19 RX ADMIN — ENOXAPARIN SODIUM 30 MG: 100 INJECTION SUBCUTANEOUS at 21:33

## 2024-11-19 RX ADMIN — INSULIN GLARGINE 35 UNITS: 100 INJECTION, SOLUTION SUBCUTANEOUS at 21:34

## 2024-11-19 RX ADMIN — SODIUM CHLORIDE, PRESERVATIVE FREE 10 ML: 5 INJECTION INTRAVENOUS at 21:39

## 2024-11-19 RX ADMIN — IPRATROPIUM BROMIDE AND ALBUTEROL SULFATE 1 DOSE: .5; 3 SOLUTION RESPIRATORY (INHALATION) at 10:26

## 2024-11-19 RX ADMIN — INSULIN LISPRO 4 UNITS: 100 INJECTION, SOLUTION INTRAVENOUS; SUBCUTANEOUS at 12:19

## 2024-11-19 RX ADMIN — MONTELUKAST 10 MG: 10 TABLET, FILM COATED ORAL at 21:32

## 2024-11-19 RX ADMIN — FUROSEMIDE 40 MG: 40 TABLET ORAL at 09:28

## 2024-11-19 RX ADMIN — FUROSEMIDE 40 MG: 40 TABLET ORAL at 17:35

## 2024-11-19 RX ADMIN — INSULIN LISPRO 5 UNITS: 100 INJECTION, SOLUTION INTRAVENOUS; SUBCUTANEOUS at 17:34

## 2024-11-19 RX ADMIN — INSULIN GLARGINE 25 UNITS: 100 INJECTION, SOLUTION SUBCUTANEOUS at 09:26

## 2024-11-19 RX ADMIN — INSULIN LISPRO 8 UNITS: 100 INJECTION, SOLUTION INTRAVENOUS; SUBCUTANEOUS at 21:35

## 2024-11-19 RX ADMIN — ENOXAPARIN SODIUM 30 MG: 100 INJECTION SUBCUTANEOUS at 09:28

## 2024-11-19 RX ADMIN — ARFORMOTEROL TARTRATE: 15 SOLUTION RESPIRATORY (INHALATION) at 10:25

## 2024-11-19 ASSESSMENT — PAIN SCALES - GENERAL
PAINLEVEL_OUTOF10: 0
PAINLEVEL_OUTOF10: 2
PAINLEVEL_OUTOF10: 0

## 2024-11-19 NOTE — CARE COORDINATION
11/19/24 0840   Service Assessment   Patient Orientation Alert and Oriented   Cognition Alert   History Provided By Patient   Primary Caregiver Self   Accompanied By/Relationship No one at bedside   Support Systems Children;Family Members   Patient's Healthcare Decision Maker is: Patient Declined (Legal Next of Kin Remains as Decision Maker)   PCP Verified by CM Yes   Last Visit to PCP Within last 3 months   Prior Functional Level Assistance with the following:;Bathing;Dressing;Housework   Current Functional Level Assistance with the following:;Bathing;Dressing;Housework   Can patient return to prior living arrangement Yes   Ability to make needs known: Good   Family able to assist with home care needs: Yes   Would you like for me to discuss the discharge plan with any other family members/significant others, and if so, who? Yes  (Krystina Jarrett)   Financial Resources Medicaid;Medicare   Community Resources None   CM/SW Referral Other (see comment)  (not applicable)   Social/Functional History   Lives With Daughter   Type of Home House   Home Layout One level   Home Access Stairs to enter with rails   Entrance Stairs - Number of Steps 4   Entrance Stairs - Rails Right   Bathroom Shower/Tub Tub/Shower unit   Bathroom Toilet Standard   Bathroom Equipment Shower chair   Bathroom Accessibility Accessible   Home Equipment None   Receives Help From Family   ADL Assistance Needs assistance   Toileting Needs assistance   Homemaking Assistance Independent   Homemaking Responsibilities Yes   Ambulation Assistance Independent   Transfer Assistance Independent   Active  No   Patient's  Info Patient's daughter or medicaid transportation   Mode of Transportation Car   Education   (not applicable)   Occupation On disability   Type of Occupation   (not applicable)   Discharge Planning   Type of Residence House   Living Arrangements Children   Current Services Prior To Admission C-pap;Oxygen Therapy   Potential

## 2024-11-19 NOTE — RT PROTOCOL NOTE
RT into room to give breathing treatment. Patient states that albuterol gives her migraines but wants to go ahead and take this am. Reports doctor is checking into to see if pharmacy carries xopenex and if not if she can have her own meds brought in. Patient informed RT would followup with doctor in regards to this request

## 2024-11-19 NOTE — RT PROTOCOL NOTE
Afternoon treatment not given as patient refused. Patient reports she received permission to have xopenex brought in and reported daughter was bringing in this evening. Duoneb changed to as needed for now as patient reports she is still willing to use if necessary

## 2024-11-19 NOTE — CONSULTS
[facility]  Pulmonary, Critical Care, and Sleep Medicine    Initial Patient Consult    Name: Olayinka Jones MRN: [id number]   : 1960 Hospital: [facility]   Date: 2024        IMPRESSION:   Asthma/COPD in acute exacerbation  Asthma -COPD overlap, steroid dependent. Maintained on triple therapy. Previously on anti- IL5R Fasenra with significant reductions while on Fasenra. On chronic Azithromycin for suppression  Chronic hypoxic respiratory failure on LTOT 3-5 LPM  Morbid obesity Body mass index is 45.17 kg/m².   MARVIN/OHS on IVAPS-AE  HFpEF  Pulmonary hypertension WHO Grp 2/3  Former smoker quit in   Osteoporosis due to chronic steroid use  Glaucoma and cataracts  History of steroid induced anh  Hypertension   DM       RECOMMENDATIONS:   Titrate supplemental O2 to saturation > 88%  Continue Pulmicort/Brovana  Scheduled bronchodilators  Start Solumedrol and taper rapidly once improvement noted.  Gylcemic monitoring and strict control jossie while on steroids  Prophylaxis issues per primary team  Resume Fasenra after discharge  PAP therapy, may use own IVAPS device  Check respiratory viral panel  Will follow with you. Thank you for consulting     Subjective:     This patient has been seen and evaluated at the request of Dr. Toro for COPD exacerbation. Patient is a 64 y.o. female former smoker with severe Asthma/COPD who presented with progressive shortness of breath and productive cough with increased home O2 requirements. Pt was noted with audible wheezes and 4-5 word dyspnea. Denies chest pain, fever or chills but reports chest heaviness. She was admitted to [facility] x 2 days in early Nov however symptoms returned and pt was started on Doxy along with her Prednisone taper from [facility]. As no improvement was noted on Doxy, pt presented to the ED.      Past Medical History:   Diagnosis Date    Asthma     Chronic lung disease     Cystocele, midline

## 2024-11-20 LAB
ANION GAP SERPL CALC-SCNC: 7 MMOL/L (ref 3–18)
BASOPHILS # BLD: 0 K/UL (ref 0–0.1)
BASOPHILS NFR BLD: 0 % (ref 0–2)
BUN SERPL-MCNC: 18 MG/DL (ref 7–18)
BUN/CREAT SERPL: 17 (ref 12–20)
CALCIUM SERPL-MCNC: 9.8 MG/DL (ref 8.5–10.1)
CHLORIDE SERPL-SCNC: 100 MMOL/L (ref 100–111)
CO2 SERPL-SCNC: 27 MMOL/L (ref 21–32)
CREAT SERPL-MCNC: 1.08 MG/DL (ref 0.6–1.3)
DIFFERENTIAL METHOD BLD: ABNORMAL
EOSINOPHIL # BLD: 0.1 K/UL (ref 0–0.4)
EOSINOPHIL NFR BLD: 1 % (ref 0–5)
ERYTHROCYTE [DISTWIDTH] IN BLOOD BY AUTOMATED COUNT: 13.7 % (ref 11.6–14.5)
GLUCOSE BLD STRIP.AUTO-MCNC: 148 MG/DL (ref 70–110)
GLUCOSE BLD STRIP.AUTO-MCNC: 161 MG/DL (ref 70–110)
GLUCOSE BLD STRIP.AUTO-MCNC: 248 MG/DL (ref 70–110)
GLUCOSE BLD STRIP.AUTO-MCNC: 276 MG/DL (ref 70–110)
GLUCOSE BLD STRIP.AUTO-MCNC: 296 MG/DL (ref 70–110)
GLUCOSE SERPL-MCNC: 265 MG/DL (ref 74–99)
HCT VFR BLD AUTO: 36.1 % (ref 35–45)
HGB BLD-MCNC: 12.1 G/DL (ref 12–16)
IMM GRANULOCYTES # BLD AUTO: 0.1 K/UL (ref 0–0.04)
IMM GRANULOCYTES NFR BLD AUTO: 1 % (ref 0–0.5)
LYMPHOCYTES # BLD: 1.1 K/UL (ref 0.9–3.6)
LYMPHOCYTES NFR BLD: 10 % (ref 21–52)
MCH RBC QN AUTO: 30.2 PG (ref 24–34)
MCHC RBC AUTO-ENTMCNC: 33.5 G/DL (ref 31–37)
MCV RBC AUTO: 90 FL (ref 78–100)
MONOCYTES # BLD: 0.5 K/UL (ref 0.05–1.2)
MONOCYTES NFR BLD: 5 % (ref 3–10)
NEUTS SEG # BLD: 9.2 K/UL (ref 1.8–8)
NEUTS SEG NFR BLD: 83 % (ref 40–73)
NRBC # BLD: 0 K/UL (ref 0–0.01)
NRBC BLD-RTO: 0 PER 100 WBC
PLATELET # BLD AUTO: 313 K/UL (ref 135–420)
PMV BLD AUTO: 10.1 FL (ref 9.2–11.8)
POTASSIUM SERPL-SCNC: 3.9 MMOL/L (ref 3.5–5.5)
RBC # BLD AUTO: 4.01 M/UL (ref 4.2–5.3)
SODIUM SERPL-SCNC: 134 MMOL/L (ref 136–145)
WBC # BLD AUTO: 11.1 K/UL (ref 4.6–13.2)

## 2024-11-20 PROCEDURE — 99232 SBSQ HOSP IP/OBS MODERATE 35: CPT | Performed by: INTERNAL MEDICINE

## 2024-11-20 PROCEDURE — 94640 AIRWAY INHALATION TREATMENT: CPT

## 2024-11-20 PROCEDURE — 6360000002 HC RX W HCPCS

## 2024-11-20 PROCEDURE — 80048 BASIC METABOLIC PNL TOTAL CA: CPT

## 2024-11-20 PROCEDURE — 2580000003 HC RX 258

## 2024-11-20 PROCEDURE — 1100000003 HC PRIVATE W/ TELEMETRY

## 2024-11-20 PROCEDURE — 36415 COLL VENOUS BLD VENIPUNCTURE: CPT

## 2024-11-20 PROCEDURE — 2700000000 HC OXYGEN THERAPY PER DAY

## 2024-11-20 PROCEDURE — 6370000000 HC RX 637 (ALT 250 FOR IP)

## 2024-11-20 PROCEDURE — 94761 N-INVAS EAR/PLS OXIMETRY MLT: CPT

## 2024-11-20 PROCEDURE — 82962 GLUCOSE BLOOD TEST: CPT

## 2024-11-20 PROCEDURE — 85025 COMPLETE CBC W/AUTO DIFF WBC: CPT

## 2024-11-20 RX ORDER — LEVALBUTEROL INHALATION SOLUTION 1.25 MG/3ML
1.25 SOLUTION RESPIRATORY (INHALATION) EVERY 4 HOURS PRN
Status: DISCONTINUED | OUTPATIENT
Start: 2024-11-20 | End: 2024-11-21 | Stop reason: HOSPADM

## 2024-11-20 RX ORDER — INSULIN LISPRO 100 [IU]/ML
5-8 INJECTION, SOLUTION INTRAVENOUS; SUBCUTANEOUS
Status: DISCONTINUED | OUTPATIENT
Start: 2024-11-20 | End: 2024-11-21 | Stop reason: HOSPADM

## 2024-11-20 RX ORDER — PREDNISONE 20 MG/1
60 TABLET ORAL DAILY
Status: DISCONTINUED | OUTPATIENT
Start: 2024-11-20 | End: 2024-11-21

## 2024-11-20 RX ORDER — LEVALBUTEROL INHALATION SOLUTION 1.25 MG/3ML
1.25 SOLUTION RESPIRATORY (INHALATION)
Status: DISCONTINUED | OUTPATIENT
Start: 2024-11-20 | End: 2024-11-20

## 2024-11-20 RX ADMIN — ENOXAPARIN SODIUM 30 MG: 100 INJECTION SUBCUTANEOUS at 09:01

## 2024-11-20 RX ADMIN — INSULIN LISPRO 5 UNITS: 100 INJECTION, SOLUTION INTRAVENOUS; SUBCUTANEOUS at 09:00

## 2024-11-20 RX ADMIN — LEVOFLOXACIN 500 MG: 500 TABLET, FILM COATED ORAL at 09:01

## 2024-11-20 RX ADMIN — LISINOPRIL 20 MG: 20 TABLET ORAL at 12:17

## 2024-11-20 RX ADMIN — INSULIN GLARGINE 35 UNITS: 100 INJECTION, SOLUTION SUBCUTANEOUS at 09:01

## 2024-11-20 RX ADMIN — PREDNISONE 60 MG: 20 TABLET ORAL at 10:46

## 2024-11-20 RX ADMIN — SODIUM CHLORIDE, PRESERVATIVE FREE 10 ML: 5 INJECTION INTRAVENOUS at 09:02

## 2024-11-20 RX ADMIN — INSULIN LISPRO 8 UNITS: 100 INJECTION, SOLUTION INTRAVENOUS; SUBCUTANEOUS at 17:32

## 2024-11-20 RX ADMIN — MONTELUKAST 10 MG: 10 TABLET, FILM COATED ORAL at 21:19

## 2024-11-20 RX ADMIN — PANTOPRAZOLE SODIUM 40 MG: 40 TABLET, DELAYED RELEASE ORAL at 07:20

## 2024-11-20 RX ADMIN — FUROSEMIDE 40 MG: 40 TABLET ORAL at 09:00

## 2024-11-20 RX ADMIN — SODIUM CHLORIDE, PRESERVATIVE FREE 10 ML: 5 INJECTION INTRAVENOUS at 21:21

## 2024-11-20 RX ADMIN — ARFORMOTEROL TARTRATE: 15 SOLUTION RESPIRATORY (INHALATION) at 19:15

## 2024-11-20 RX ADMIN — INSULIN GLARGINE 35 UNITS: 100 INJECTION, SOLUTION SUBCUTANEOUS at 21:20

## 2024-11-20 RX ADMIN — INSULIN LISPRO 5 UNITS: 100 INJECTION, SOLUTION INTRAVENOUS; SUBCUTANEOUS at 12:17

## 2024-11-20 RX ADMIN — PANTOPRAZOLE SODIUM 40 MG: 40 TABLET, DELAYED RELEASE ORAL at 17:33

## 2024-11-20 RX ADMIN — INSULIN LISPRO 4 UNITS: 100 INJECTION, SOLUTION INTRAVENOUS; SUBCUTANEOUS at 02:13

## 2024-11-20 RX ADMIN — LEVALBUTEROL INHALATION SOLUTION 1.25 MG: 1.25 SOLUTION RESPIRATORY (INHALATION) at 19:16

## 2024-11-20 RX ADMIN — ARFORMOTEROL TARTRATE: 15 SOLUTION RESPIRATORY (INHALATION) at 07:41

## 2024-11-20 RX ADMIN — FUROSEMIDE 40 MG: 40 TABLET ORAL at 17:33

## 2024-11-20 RX ADMIN — INSULIN LISPRO 6 UNITS: 100 INJECTION, SOLUTION INTRAVENOUS; SUBCUTANEOUS at 17:32

## 2024-11-20 RX ADMIN — ENOXAPARIN SODIUM 30 MG: 100 INJECTION SUBCUTANEOUS at 21:19

## 2024-11-20 RX ADMIN — INSULIN LISPRO 8 UNITS: 100 INJECTION, SOLUTION INTRAVENOUS; SUBCUTANEOUS at 21:20

## 2024-11-20 ASSESSMENT — PAIN SCALES - GENERAL
PAINLEVEL_OUTOF10: 0
PAINLEVEL_OUTOF10: 0

## 2024-11-20 NOTE — DISCHARGE SUMMARY
Specimen: Sputum Expectorated Updated: 11/21/24 1415     Special Requests NO SPECIAL REQUESTS        Gram Stain       OCCASIONAL EPITHELIAL CELLS SEEN            OCCASIONAL WBCS SEEN               OCCASIONAL GRAM POSITIVE COCCI IN PAIRS           Culture       LIGHT NORMAL RESPIRATORY CLEMENTINA          COVID-19 & Influenza Combo [[id number]] Collected: 11/18/24 1205    Order Status: Completed Specimen: Nasopharyngeal Updated: 11/18/24 1251     Source Nasopharyngeal        SARS-CoV-2, PCR Not detected        Comment: Not Detected results do not preclude SARS-CoV-2 infection and should not be used as the sole basis for patient management decisions.Results must be combined with clinical observations, patient history, and epidemiological information.        Rapid Influenza A By PCR Not detected        Rapid Influenza B By PCR Not detected        Comment: Testing was performed using sherry Sara SARS-CoV-2 and Influenza A/B nucleic acid assay.  This test is a multiplex Real-Time Reverse Transcriptase Polymerase Chain Reaction (RT-PCR) based in vitro diagnostic test intended for the qualitative detection of nucleic acids from SARS-CoV-2, Influenza A, and Influenza B in nasopharyngeal specimens.                    UA Lab Results   Component Value Date/Time    APPEARANCE CLEAR 02/27/2024 11:50 PM    COLORU YELLOW 02/27/2024 11:50 PM    NITRU Negative 02/27/2024 11:50 PM    GLUCOSEU >1000 02/27/2024 11:50 PM    KETUA TRACE 02/27/2024 11:50 PM    UROBILINOGEN 1.0 02/27/2024 11:50 PM    BILIRUBINUR Negative 02/27/2024 11:50 PM    BILIRUBINUR Negative 08/31/2020 06:08 PM         Laboratory Results:  LABORATORY RESULTS   HEMATOLOGY Lab Results   Component Value Date    WBC 11.1 11/21/2024    HGB 13.2 11/21/2024    HCT 38.9 11/21/2024    MCV 88.0 11/21/2024     11/21/2024       CHEMISTRIES Lab Results   Component Value Date/Time     11/21/2024 05:58 AM    K 3.2 11/21/2024 05:58 AM     11/21/2024 05:58 AM    CO2 31

## 2024-11-21 VITALS
HEART RATE: 78 BPM | HEIGHT: 63 IN | TEMPERATURE: 97.7 F | WEIGHT: 255 LBS | DIASTOLIC BLOOD PRESSURE: 71 MMHG | OXYGEN SATURATION: 95 % | RESPIRATION RATE: 18 BRPM | SYSTOLIC BLOOD PRESSURE: 164 MMHG | BODY MASS INDEX: 45.18 KG/M2

## 2024-11-21 LAB
ANION GAP SERPL CALC-SCNC: 6 MMOL/L (ref 3–18)
BACTERIA SPEC CULT: NORMAL
BASOPHILS # BLD: 0 K/UL (ref 0–0.1)
BASOPHILS NFR BLD: 0 % (ref 0–2)
BUN SERPL-MCNC: 19 MG/DL (ref 7–18)
BUN/CREAT SERPL: 17 (ref 12–20)
CALCIUM SERPL-MCNC: 10 MG/DL (ref 8.5–10.1)
CHLORIDE SERPL-SCNC: 102 MMOL/L (ref 100–111)
CO2 SERPL-SCNC: 31 MMOL/L (ref 21–32)
CREAT SERPL-MCNC: 1.11 MG/DL (ref 0.6–1.3)
DIFFERENTIAL METHOD BLD: ABNORMAL
EOSINOPHIL # BLD: 0 K/UL (ref 0–0.4)
EOSINOPHIL NFR BLD: 0 % (ref 0–5)
ERYTHROCYTE [DISTWIDTH] IN BLOOD BY AUTOMATED COUNT: 13.7 % (ref 11.6–14.5)
GLUCOSE BLD STRIP.AUTO-MCNC: 116 MG/DL (ref 70–110)
GLUCOSE BLD STRIP.AUTO-MCNC: 152 MG/DL (ref 70–110)
GLUCOSE BLD STRIP.AUTO-MCNC: 173 MG/DL (ref 70–110)
GLUCOSE SERPL-MCNC: 120 MG/DL (ref 74–99)
GRAM STN SPEC: NORMAL
HCT VFR BLD AUTO: 38.9 % (ref 35–45)
HGB BLD-MCNC: 13.2 G/DL (ref 12–16)
IMM GRANULOCYTES # BLD AUTO: 0.1 K/UL (ref 0–0.04)
IMM GRANULOCYTES NFR BLD AUTO: 1 % (ref 0–0.5)
LYMPHOCYTES # BLD: 3 K/UL (ref 0.9–3.6)
LYMPHOCYTES NFR BLD: 27 % (ref 21–52)
MCH RBC QN AUTO: 29.9 PG (ref 24–34)
MCHC RBC AUTO-ENTMCNC: 33.9 G/DL (ref 31–37)
MCV RBC AUTO: 88 FL (ref 78–100)
MONOCYTES # BLD: 0.6 K/UL (ref 0.05–1.2)
MONOCYTES NFR BLD: 6 % (ref 3–10)
NEUTS SEG # BLD: 7.3 K/UL (ref 1.8–8)
NEUTS SEG NFR BLD: 66 % (ref 40–73)
NRBC # BLD: 0 K/UL (ref 0–0.01)
NRBC BLD-RTO: 0 PER 100 WBC
PLATELET # BLD AUTO: 346 K/UL (ref 135–420)
PMV BLD AUTO: 8.8 FL (ref 9.2–11.8)
POTASSIUM SERPL-SCNC: 3.2 MMOL/L (ref 3.5–5.5)
RBC # BLD AUTO: 4.42 M/UL (ref 4.2–5.3)
SERVICE CMNT-IMP: NORMAL
SODIUM SERPL-SCNC: 139 MMOL/L (ref 136–145)
WBC # BLD AUTO: 11.1 K/UL (ref 4.6–13.2)

## 2024-11-21 PROCEDURE — 97116 GAIT TRAINING THERAPY: CPT

## 2024-11-21 PROCEDURE — 36415 COLL VENOUS BLD VENIPUNCTURE: CPT

## 2024-11-21 PROCEDURE — 85025 COMPLETE CBC W/AUTO DIFF WBC: CPT

## 2024-11-21 PROCEDURE — 97162 PT EVAL MOD COMPLEX 30 MIN: CPT

## 2024-11-21 PROCEDURE — 6370000000 HC RX 637 (ALT 250 FOR IP)

## 2024-11-21 PROCEDURE — 94640 AIRWAY INHALATION TREATMENT: CPT

## 2024-11-21 PROCEDURE — 82962 GLUCOSE BLOOD TEST: CPT

## 2024-11-21 PROCEDURE — 2700000000 HC OXYGEN THERAPY PER DAY

## 2024-11-21 PROCEDURE — 97165 OT EVAL LOW COMPLEX 30 MIN: CPT

## 2024-11-21 PROCEDURE — 80048 BASIC METABOLIC PNL TOTAL CA: CPT

## 2024-11-21 PROCEDURE — 2580000003 HC RX 258

## 2024-11-21 PROCEDURE — 6360000002 HC RX W HCPCS

## 2024-11-21 PROCEDURE — 94761 N-INVAS EAR/PLS OXIMETRY MLT: CPT

## 2024-11-21 PROCEDURE — 6370000000 HC RX 637 (ALT 250 FOR IP): Performed by: FAMILY MEDICINE

## 2024-11-21 RX ORDER — LEVOFLOXACIN 500 MG/1
500 TABLET, FILM COATED ORAL DAILY
Qty: 1 TABLET | Refills: 0 | Status: SHIPPED | OUTPATIENT
Start: 2024-11-22 | End: 2024-11-23

## 2024-11-21 RX ORDER — PREDNISONE 20 MG/1
TABLET ORAL
Qty: 23 TABLET | Refills: 0 | Status: SHIPPED | OUTPATIENT
Start: 2024-11-22 | End: 2024-12-03

## 2024-11-21 RX ORDER — PREDNISONE 20 MG/1
20 TABLET ORAL DAILY
Status: DISCONTINUED | OUTPATIENT
Start: 2024-12-01 | End: 2024-11-21 | Stop reason: HOSPADM

## 2024-11-21 RX ORDER — PREDNISONE 20 MG/1
60 TABLET ORAL DAILY
Status: DISCONTINUED | OUTPATIENT
Start: 2024-11-22 | End: 2024-11-21 | Stop reason: HOSPADM

## 2024-11-21 RX ORDER — POTASSIUM CHLORIDE 1500 MG/1
20 TABLET, EXTENDED RELEASE ORAL ONCE
Status: COMPLETED | OUTPATIENT
Start: 2024-11-21 | End: 2024-11-21

## 2024-11-21 RX ORDER — PREDNISONE 20 MG/1
40 TABLET ORAL DAILY
Status: DISCONTINUED | OUTPATIENT
Start: 2024-11-25 | End: 2024-11-21 | Stop reason: HOSPADM

## 2024-11-21 RX ORDER — POTASSIUM CHLORIDE 1500 MG/1
40 TABLET, EXTENDED RELEASE ORAL ONCE
Status: COMPLETED | OUTPATIENT
Start: 2024-11-21 | End: 2024-11-21

## 2024-11-21 RX ADMIN — POTASSIUM CHLORIDE 40 MEQ: 1500 TABLET, EXTENDED RELEASE ORAL at 09:34

## 2024-11-21 RX ADMIN — FUROSEMIDE 40 MG: 40 TABLET ORAL at 09:30

## 2024-11-21 RX ADMIN — LEVALBUTEROL INHALATION SOLUTION 1.25 MG: 1.25 SOLUTION RESPIRATORY (INHALATION) at 11:36

## 2024-11-21 RX ADMIN — INSULIN LISPRO 5 UNITS: 100 INJECTION, SOLUTION INTRAVENOUS; SUBCUTANEOUS at 09:00

## 2024-11-21 RX ADMIN — INSULIN LISPRO 5 UNITS: 100 INJECTION, SOLUTION INTRAVENOUS; SUBCUTANEOUS at 12:34

## 2024-11-21 RX ADMIN — PREDNISONE 60 MG: 20 TABLET ORAL at 09:30

## 2024-11-21 RX ADMIN — SODIUM CHLORIDE, PRESERVATIVE FREE 10 ML: 5 INJECTION INTRAVENOUS at 09:32

## 2024-11-21 RX ADMIN — ARFORMOTEROL TARTRATE: 15 SOLUTION RESPIRATORY (INHALATION) at 08:47

## 2024-11-21 RX ADMIN — PANTOPRAZOLE SODIUM 40 MG: 40 TABLET, DELAYED RELEASE ORAL at 06:51

## 2024-11-21 RX ADMIN — LEVALBUTEROL INHALATION SOLUTION 1.25 MG: 1.25 SOLUTION RESPIRATORY (INHALATION) at 08:51

## 2024-11-21 RX ADMIN — LEVOFLOXACIN 500 MG: 500 TABLET, FILM COATED ORAL at 09:30

## 2024-11-21 RX ADMIN — INSULIN LISPRO 2 UNITS: 100 INJECTION, SOLUTION INTRAVENOUS; SUBCUTANEOUS at 02:56

## 2024-11-21 RX ADMIN — ENOXAPARIN SODIUM 30 MG: 100 INJECTION SUBCUTANEOUS at 09:30

## 2024-11-21 RX ADMIN — INSULIN GLARGINE 35 UNITS: 100 INJECTION, SOLUTION SUBCUTANEOUS at 09:00

## 2024-11-21 RX ADMIN — POTASSIUM CHLORIDE 20 MEQ: 1500 TABLET, EXTENDED RELEASE ORAL at 12:33

## 2024-11-21 RX ADMIN — LISINOPRIL 20 MG: 20 TABLET ORAL at 12:33

## 2024-11-21 ASSESSMENT — PAIN SCALES - GENERAL: PAINLEVEL_OUTOF10: 0

## 2024-11-21 NOTE — PROGRESS NOTES
[facility] Family Medicine  DAILY PROGRESS NOTE      Patient:    Olayinka Jones , 64 y.o. female   MRN:  [id number]  Room/Bed:  408/01  Admission Date:   11/18/2024  Code status:  DNR    Reason for Admission: Acute on chronic COPD exacerbation  Barriers to Discharge: improvement in respiratory status, sputum cx  Anticipated Date of Discharge: 11/21/24      ASSESSMENT AND PLAN:   Olayinka Jones is a 64 y.o. year old female with PMH of COPD, asthma, HFpEF, T2DM on insulin, GERD, HTN, pulmonary HTN, HLD, MARVIN on CPAP admitted for COPD exacerbation.       COPD Exacerbation, acute on chronic  Hx of chronic steroid use  Asthma  MARVIN on CPAP  -Ddx: COPD exacerbation vs. Asthma exacerbation vs. CAP vs. PE. Patient with negative d-dimer and no CP or lower extremity tenderness so lower suspicion for PE at this time. CXR showing possible atelectasis vs infiltrates, patient afebrile and on chronic abx but cannot completely rule out infectious process at this time. Presentation most convincing for acute on chronic COPD exacerbation given pt cough productive of green sputum and hx of recurrent exacerbations.  -CXR on admission suggestive of subtle atelectasis vs interstitial infiltrates although study somewhat limited in quality  -In ED patient received IV solumedrol 125 mg after which he reported some improvement  -See pulmonology outpatient, Dr. Key. Last visit 9/23/2024. Started on biologic Fasenra in 2020, takes every 8 weeks  -Home meds: xopenex 1.25 mg nebs q4prn, advair 231-21 mg 2 puff BID, pulmicort 1mg neb daily, azithromycin 250 mg MWF, Spiriva 1.25 mcg 2 puff daily, prednisone 10 mg daily, singulair 10 mg daily  Plan:  -continue home furosemide, singulair  -Ordered formulary alternative for advair, spiriva  -Albuterol scheduled q4hr, PRN albuterol nebs q2hr  -s/p IV levaquin 500 mg in ED so covered for 24 hrs, will consider continuing levaquin vs starting augmentin pending respiratory cx. Hold home PO 
  [facility] Family Medicine  POST-ROUNDING NOTE    Assessment & Plan:    -Patient seen on rounds with full team  -Continues to endorse headache she attributes to albuterol, informed patient she has tylenol ordered and can ask for it for headache  -Discussed insulin regimen with patient in detail. Will increase lantus to 35U BID, increase SSI to high dose, 5U postprandial insulin TID w/ meals, and fingerstick glucose will be checked at 2 am with SSI administered based of levels. Patient in agreement with this plan. Will continue to adjust insulin regimen as needed based on blood glucoses    -Pulmonology consulted, patient is aware that though she sees Dr. Key outpatient she may be seen by one of his colleagues. Greatly appreciate pulmonology recs.  -Orders placed (non-formulary) for patient to be able to have her home advair and xopenex brought to the hospital for her use after confirming with pharmacy.  -Will change lisinopril 40 mg back to 20 mg per patient request. She takes her lasix in the morning w/ breakfast, lisinopril 20 mg at noon, and second lasix dose in the afternoon. Will try to time medication orders to reflect this regimen however explained to patient the timing may not be exactly the same to which she expressed understanding      The above patient and plan were discussed with my supervising physician.     See daily progress note for full assessment/plan.      Amee Toro MD, PGY-1  [facility]  11/19/2024, 11:24 AM        
  [facility] Family Medicine  DAILY PROGRESS NOTE  MEDICAL STUDENT NOTE FOR LEARNING PURPOSES ONLY    Patient:    Olayinka Jones , 64 y.o. female   MRN:  [id number]  Room/Bed:  408/01  Admission Date:   11/18/2024  Code status:  DNR    Reason for Admission: Acute on chronic COPD exacerbation  Barriers to Discharge: Improvement in respiratory status  Expected date of discharge: 11/21/24    ASSESSMENT AND PLAN:     Acute on chronic COPD exacerbation  -Daughter brought home pulm meds  -improvement in SOB, decreased sputum production  Plan:  -Continue home xopinex 1.25mg q4h and advair 2 puff BID  -Continue home lasix 40mg BID PO  -Hold home azithromycin  -lovenox for DVT prophylaxis  -Check sputum  -PO prednisone 60mg, steroid taper  -Daily BMP, CBC        T2DM  -Blood glucose ranging between 148-296 in last 24 hr  Plan:  -35U Lantus BID   -5 units prandial insulin breakfast, 7 units lunch, 8 units dinner  -0200 blood glucose checks  -aggressive dose correctional insulin     HfpEF, HTN, pulm HTN, HLD  -Pressures have been 134-153/56-80 in last 24 hr   Plan:  -Continue home lasix 40mg BID  -Continue home lisinopril 20mg daily     GERD  -Stable on home regimen  Plan:  -Protonix 40 mg BID      Global:  Code: DNR  IV fluids:  Diet: regular  DVT/AC: lovenox  Mobility: per protocol    Point of Contact (relationship): Krystina James [phone]         SUBJECTIVE:   Events of the last 24 hours:  No acute events overnight.  Patient seen at beside. No acute complaints.   Pt feels much improved. Still has mild sob, cough, and sputum production. Can walk to bathroom and back. Back to pulmonary baseline but nervous about the prednisone taper. Wants to do a walk test today.    ROS (positive findings are in BOLD; negative findings are in regular font)  Constitutional: fevers, chills, appetite changes, weight changes, fatigue  HEENT: changes in vision, changes in hearing, sore throat  Cardiovascular: chest pain, 
  [facility] Family Medicine  DAILY PROGRESS NOTE      Patient:    Olayinka Jones , 64 y.o. female   MRN:  [id number]  Room/Bed:  408/01  Admission Date:   11/18/2024  Code status:  DNR    Reason for Admission: Acute on chronic COPD exacerbation  Barriers to Discharge: Improvement in respiratory status  ASSESSMENT AND PLAN:   Olayinka Jones is a 64 y.o. year old female with PMH of COPD, asthma, HFpEF, T2DM on insulin, GERD, HTN, pulmonary HTN, HLD, MARVIN on CPAP admitted for COPD exacerbation. Currently being treated with steroids and antibiotics.     COPD Exacerbation, acute on chronic  Hx of chronic steroid use  Asthma  MARVIN on CPAP  -CXR on admission suggestive of subtle atelectasis vs interstitial infiltrates although study somewhat limited in quality  -In ED patient received IV solumedrol 125 mg after which he reported some improvement  -See pulmonology outpatient, Dr. Key. Last visit 9/23/2024. Started on biologic Fasenra in 2020, takes every 8 weeks  -Home meds: xopenex 1.25 mg nebs q4prn, advair 231-21 mg 2 puff BID, pulmicort 1mg neb daily, azithromycin 250 mg MWF, Spiriva 1.25 mcg 2 puff daily, prednisone 10 mg daily, singulair 10 mg daily  -Sputum cx collected on 11/19 shows light normal respiratory sangeeta  -RPP negative on 11/19  -Patient's daughter brought IVAPS machine from home for her to use at night  Plan:  -continue home furosemide, singulair  -Ordered formulary alternative for advair, spiriva  -Albuterol scheduled q4hr, PRN albuterol nebs q2hr  -duonebs q4 prn  -PO levaquin 500 mg daily  -PO prednisone 60 mg daily  -Orders placed for patient to use home advair and xopenex 11/19  -Pulmonology consulted, appreciate recs  -lovenox for DVT prophylaxis  -Daily BMP, CBC  -zofran prn for nausea/vomiting  -Tylenol prn for pain, headache       T2DM, insulin dependent w/ neuropathy  -Patient w/ hx of T2DM on insulin at home. States she does not take anything at home for her neuropathy.  -Most 
  [facility] Family Medicine  POST-ROUNDING NOTE    Assessment & Plan:    -Patient seen on rounds with full team   -Pulmonology consulted, recommending solumedrol with rapid taper upon improvement. Continue to appreciate assistance.  -Patient treated with 125 mg solumedrol daily x2 days. Received alert in EMR to methylprednisolone. Given patient expressing symptomatic improvement today, will transition to 60 mg prednisone today and monitor clinical improvement.  -Will alter prandial insulin to 5,7, and 8 with breakfast, lunch, and dinner respectively. Hold if NPO. Discussed with RN.  -Diabetic diet  -Flutter valve    The above patient and plan were discussed with my supervising physician.     See daily progress note for full assessment/plan.      Amee Toro MD, PGY-1  [facility]  11/20/2024, 7:40 AM       
  [facility] Family Medicine  DAILY PROGRESS NOTE      Patient:    Olayinka Jones , 64 y.o. female   MRN:  [id number]  Room/Bed:  408/01  Admission Date:   11/18/2024  Code status:  DNR    Reason for Admission: Acute on chronic COPD exacerbation  Barriers to Discharge: Improvement in respiratory status, sputum cx, pulmonology recommendations  ASSESSMENT AND PLAN:   Olayinka Jones is a 64 y.o. year old female with PMH of COPD, asthma, HFpEF, T2DM on insulin, GERD, HTN, pulmonary HTN, HLD, MARVIN on CPAP admitted for COPD exacerbation. Currently being treated with steroids and antibiotics.     COPD Exacerbation, acute on chronic  Hx of chronic steroid use  Asthma  MARVIN on CPAP  -CXR on admission suggestive of subtle atelectasis vs interstitial infiltrates although study somewhat limited in quality  -In ED patient received IV solumedrol 125 mg after which he reported some improvement  -See pulmonology outpatient, Dr. Key. Last visit 9/23/2024. Started on biologic Fasenra in 2020, takes every 8 weeks  -Home meds: xopenex 1.25 mg nebs q4prn, advair 231-21 mg 2 puff BID, pulmicort 1mg neb daily, azithromycin 250 mg MWF, Spiriva 1.25 mcg 2 puff daily, prednisone 10 mg daily, singulair 10 mg daily  -Sputum cx collected on 11/19 and pending  -RPP negative on 11/19  -Patient's daughter brought IVAPS machine from home for her to use at night  Plan:  -continue home furosemide, singulair  -Ordered formulary alternative for advair, spiriva  -Albuterol scheduled q4hr, PRN albuterol nebs q2hr  -duonebs q4 prn  -PO levaquin 500 mg daily  -Sputum culture pending  -Orders placed for patient to use home advair and xopenex 11/19  -Pulmonology consulted, appreciate recs regarding steroid dosing  -lovenox for DVT prophylaxis  -Daily BMP, CBC  -zofran prn for nausea/vomiting  -Tylenol prn for pain, headache       T2DM, insulin dependent w/ neuropathy  -Patient w/ hx of T2DM on insulin at home. States she does not take anything 
  [facility] Family Medicine  DAILY PROGRESS NOTE  MEDICAL STUDENT NOTE FOR LEARNING PURPOSES ONLY    Patient:    Olayinka Jones , 64 y.o. female   MRN:  [id number]  Room/Bed:  408/01  Admission Date:   11/18/2024  Code status:  DNR    Reason for Admission: Acute on chronic COPD exacerbation  Barriers to Discharge: Improvement in respiratory status  Expected date of discharge: 11/21/24    ASSESSMENT AND PLAN:   Mrs. Jones is a 64 y.o. female w/ PMH of COPD, asthma, HFpEF, T2DM on insulin, GERD, HTN, pulm HTN, HLD, and MARVIN on CPAP, admitted for COPD exacerbation.     Acute on chronic COPD exacerbation  -Daughter brought home pulm meds  -improvement in SOB, decreased sputum production  Plan:  -Continue home xopinex 1.25mg q4h and advair 2 puff BID  -Continue home furosemide  -Hold home azithromycin  -lovenox for DVT prophylaxis  -Check sputum  -IV solumedrol 60mg BID  -Daily BMP, CBC      T2DM  -Blood glucose ranging between 248-320 in last 24 hr  Plan:  -35U Lantus BID   -5U sliding scale prandial insulin  -0200 blood glucose checks  -aggressive dose correctional insulin    HfpEF, HTN, pulm HTN, HLD  -BP has been consistently elevated 134-180/68-80 in last 24 hr   Plan:  -Continue home lasix 40mg BID  -Continue home lisinopril 20mg daily    GERD  -Stable on home regimen  Plan:  -Protonix 40 mg BID       Global:  Code: DNR  IV fluids: none  Diet: regular  DVT/AC: lovenox  Mobility: per protocol    Point of Contact (relationship):  Krystina James [phone]         SUBJECTIVE:   Events of the last 24 hours:  No acute events overnight.  Patient seen at beside. No acute complaints. Did not get solumedrol yesterday. Feels much better overall, cough decreasing, sputum production decreasing and less green. Can walk to the bathroom w/o getting SOB and less wheezing.    ROS (positive findings are in BOLD; negative findings are in regular font)  Constitutional: fevers, chills, appetite changes, weight changes, 
4 Eyes Skin Assessment     NAME:  Olayinka Jones  YOB: 1960  MEDICAL RECORD NUMBER:  [id number]    The patient is being assessed for  {Reason for Assessment:87884}    I agree that at least one RN has performed a thorough Head to Toe Skin Assessment on the patient. ALL assessment sites listed below have been assessed.      Areas assessed by both nurses:    Head, Face, Ears, Shoulders, Back, Chest, Arms, Elbows, Hands, Sacrum. Buttock, Coccyx, Ischium, Legs. Feet and Heels, and Under Medical Devices         Does the Patient have a Wound? No noted wound(s)       Daniel Prevention initiated by RN: No  Wound Care Orders initiated by RN: No    Pressure Injury (Stage 3,4, Unstageable, DTI, NWPT, and Complex wounds) if present, place Wound referral order by RN under : No    New Ostomies, if present place, Ostomy referral order under : No     Nurse 1 eSignature: Electronically signed by Taryn Prasad RN on 11/20/24 at 6:33 PM EST    **SHARE this note so that the co-signing nurse can place an eSignature**    Nurse 2 eSignature: {Esignature:[id number]}   
4 Eyes Skin Assessment     NAME:  Olayinka Jones  YOB: 1960  MEDICAL RECORD NUMBER:  [id number]    The patient is being assessed for  Admission    I agree that at least one RN has performed a thorough Head to Toe Skin Assessment on the patient. ALL assessment sites listed below have been assessed.      Areas assessed by both nurses:    Head, Face, Ears, Shoulders, Back, Chest, Arms, Elbows, Hands, Sacrum. Buttock, Coccyx, Ischium, Legs. Feet and Heels, Under Medical Devices , and Other na        Does the Patient have a Wound? No noted wound(s)       Daniel Prevention initiated by RN: No  Wound Care Orders initiated by RN: No    Pressure Injury (Stage 3,4, Unstageable, DTI, NWPT, and Complex wounds) if present, place Wound referral order by RN under : No    New Ostomies, if present place, Ostomy referral order under : No     Nurse 1 eSignature: Electronically signed by Yaima Waters RN on 11/19/24 at 12:38 AM EST    **SHARE this note so that the co-signing nurse can place an eSignature**    Nurse 2 eSignature: Electronically signed by MARY KEARNEY RN on 11/19/24 at 12:59 AM EST    
4 Eyes Skin Assessment     NAME:  Olayinka Jones  YOB: 1960  MEDICAL RECORD NUMBER:  [id number]    The patient is being assessed for  Shift Handoff    I agree that at least one RN has performed a thorough Head to Toe Skin Assessment on the patient. ALL assessment sites listed below have been assessed.      Areas assessed by both nurses:    Head, Face, Ears, Shoulders, Back, Chest, Arms, Elbows, Hands, Sacrum. Buttock, Coccyx, Ischium, Legs. Feet and Heels, and Under Medical Devices         Does the Patient have a Wound? No noted wound(s)       Daniel Prevention initiated by RN: Yes  Wound Care Orders initiated by RN: No    Pressure Injury (Stage 3,4, Unstageable, DTI, NWPT, and Complex wounds) if present, place Wound referral order by RN under : No    New Ostomies, if present place, Ostomy referral order under : No     Nurse 1 eSignature: Electronically signed by Jelani Ramirez RN on 11/19/24 at 8:11 PM EST    **SHARE this note so that the co-signing nurse can place an eSignature**    Nurse 2 eSignature: Electronically signed by DESTINY LUA RN on 11/19/24 at 8:47 PM EST    
4 Eyes Skin Assessment     NAME:  Olayinka Jones  YOB: 1960  MEDICAL RECORD NUMBER:  [id number]    The patient is being assessed for  Shift Handoff    I agree that at least one RN has performed a thorough Head to Toe Skin Assessment on the patient. ALL assessment sites listed below have been assessed.      Areas assessed by both nurses:    Head, Face, Ears, Shoulders, Back, Chest, Arms, Elbows, Hands, Sacrum. Buttock, Coccyx, Ischium, and Legs. Feet and Heels        Does the Patient have a Wound? No noted wound(s)       Daniel Prevention initiated by RN: Yes  Wound Care Orders initiated by RN: No    Pressure Injury (Stage 3,4, Unstageable, DTI, NWPT, and Complex wounds) if present, place Wound referral order by RN under : No    New Ostomies, if present place, Ostomy referral order under : No     Nurse 1 eSignature: Electronically signed by DESTINY LUA RN on 11/19/24 at 8:48 PM EST    **SHARE this note so that the co-signing nurse can place an eSignature**    Nurse 2 eSignature: {Esignature:[id number]}    
Advance Care Planning   Healthcare Decision Maker:    Secondary Decision Maker: Cadence Agarwal - Other - [phone]    Today we documented Decision Maker(s) consistent with Legal Next of Kin hierarchy.       Spiritual Health History and Assessment/Progress Note  [facility]    (P) Spiritual/Emotional Needs,  ,  ,      Name: Olayinka Jones MRN: [id number]    Age: 64 y.o.     Sex: female   Language: English   Jew: Quaker   COPD with asthma (HCC)     Date: 11/20/2024            Total Time Calculated: (P) 7 min              Spiritual Assessment began in [facility]        Referral/Consult From: (P) Patient   Encounter Overview/Reason: (P) Spiritual/Emotional Needs  Service Provided For: (P) Patient    Chikis, Belief, Meaning:   Patient has beliefs or practices that help with coping during difficult times  Family/Friends No family/friends present      Importance and Influence:  Patient has spiritual/personal beliefs that influence decisions regarding their health  Family/Friends No family/friends present    Community:  Patient feels well-supported. Support system includes: Extended family  Family/Friends No family/friends present    Assessment and Plan of Care:     Patient Interventions include: Affirmed coping skills/support systems  Family/Friends Interventions include: No family/friends present    Patient Plan of Care: No spiritual needs identified for follow-up  Family/Friends Plan of Care: No family/friends present    Electronically signed by PROSPER Lee on 11/20/2024 at 6:38 PM      
OCCUPATIONAL THERAPY EVALUATION/DISCHARGE    Patient: Olayinka Jones (64 y.o. female)  Date: 11/21/2024  Primary Diagnosis: COPD with asthma ([facility]) [J44.89]  COPD exacerbation ([facility]) [J44.1]       Precautions: General Precautions, Fall Risk  PLOF: Pt lives with daughter in 1 SH, tub/shower, 2L NC at home, uses rollator for functional mobility, previously had PCA 8 hrs/day mon-fri to assist with dressing, bathing, cooking, hoping to schedule PCA to come back in December.      ASSESSMENT AND RECOMMENDATIONS:  Pt exiting BR upon arrival, agreeable to OT eval. Mod I BR mobility to sit EOB no AD. Pt BUE AROM WFL and strength generally decreased but functional, grossly 4-/5. States she has someone at home who helps with donning socks and shoes. Educated on energy conservation techniques, verbalized understanding. Pt left seated EOB with call bell near and all needs met. Denies any further OT needs.     Maximum therapeutic gains met at current level of care and patient will be discharged from occupational therapy at this time.    Further Equipment Recommendations for Discharge: Patient has all needed DME for home safety.    [facility]: AM-PAC Inpatient Daily Activity Raw Score: 22    Current research shows that an AM-PAC score of 18 or greater is associated with a discharge to the patient's home setting.    This AMPA score should be considered in conjunction with interdisciplinary team recommendations to determine the most appropriate discharge setting. Patient's social support, diagnosis, medical stability, and prior level of function should also be taken into consideration.     SUBJECTIVE:   Patient stated “The best thing I learned from OT was how to sit.”    OBJECTIVE DATA SUMMARY:     Past Medical History:   Diagnosis Date    Asthma     Chronic lung disease     Cystocele, midline     Diabetes mellitus (HCC)     GERD (gastroesophageal reflux disease)     Hidradenitis suppurativa     Hyperlipidemia     Hypertension  
PHYSICAL THERAPY EVALUATION/DISCHARGE    Patient: Olayinka Jones (64 y.o. female)  Date: 11/21/2024  Primary Diagnosis: COPD with asthma (HCC) [J44.89]  COPD exacerbation (HCC) [J44.1]  Precautions: None  PLOF: Mod I with rollator   ASSESSMENT AND RECOMMENDATIONS:  Seated EOB. Reports amb in room throughout admission. On 2L O2; baseline. Mod I for transfers. Amb with steady gait with no AD. Good safety awareness of O2 lines. Returned to seated EOB. Call bell in reach.     Patient does not require further skilled physical therapy intervention at this level of care.    Further Equipment Recommendations for Discharge:  Has all needed DME    AM-PAC Inpatient Mobility Raw Score : 24    This [facility] score should be considered in conjunction with interdisciplinary team recommendations to determine the most appropriate discharge setting. Patient's social support, diagnosis, medical stability, and prior level of function should also be taken into consideration.    Current research shows that an AM-PAC score of 18 (14 without stairs) or greater is associated with a discharge to the patient's home setting.     SUBJECTIVE:   Patient stated “I have a chair in every room.”    OBJECTIVE DATA SUMMARY:     Past Medical History:   Diagnosis Date    Asthma     Chronic lung disease     Cystocele, midline     Diabetes mellitus (HCC)     GERD (gastroesophageal reflux disease)     Hidradenitis suppurativa     Hyperlipidemia     Hypertension     MARVIN on CPAP     CPAP    Stress incontinence      Past Surgical History:   Procedure Laterality Date    APPENDECTOMY      BREAST SURGERY      Right breast benign tumor removal    CARDIAC CATHETERIZATION  12/2020    CATARACT REMOVAL Right     CHOLECYSTECTOMY      DILATION AND CURETTAGE OF UTERUS      Dysfunctional uterine bleeding, thought 2/2 fibroids    TUBAL LIGATION       Home Situation:  Social/Functional History  Lives With: Daughter  Type of Home: House  Home Layout: One level  Home 
Progress Note  Pulmonary, Critical Care, and Sleep Medicine    Name: Olayinka Jones MRN: [id number]   : 1960 Hospital: [facility]   Date: 2024        IMPRESSION:   Asthma/COPD in acute exacerbation  Asthma -COPD overlap, steroid dependent. Maintained on triple therapy with double ICS. Also on anti- IL5R Fasenra with significant reductions in exacerbations since starting Fasenra. On chronic Azithromycin for suppression as well  Chronic hypoxic respiratory failure on LTOT 3-5 LPM  Morbid obesity Body mass index is 45.17 kg/m².   MARVIN/OHS on IVAPS-AE  HFpEF  Pulmonary hypertension WHO Grp 2/3  Former smoker quit in   Osteoporosis due to chronic steroid use  Glaucoma and cataracts  History of steroid induced anh  Hypertension   DM         PLAN:   Titrate to SpO2 > 88, currently at baseline FiO2  Pulmicort, Brovana, Duonebs  Continue steroid taper, switched to po Prednisone  Glycemic control per primary team  May use own IVAPs  Resume Fasenra on discharge  RVP negative  Ambulate pt, PT/OT     Subjective/Interval History:   I have reviewed the flowsheet and previous day’s notes. Reviewed interval history. Discussed management with nursing staff.      24  Afebrile, VSS  No new respiratory complaints  Less SOB today      ROS:Pertinent items are noted in HPI.  Orders reviewed including medications. Changes made if indicated.   Telemetry monitor reviewed at the bedside.  Objective:   Vital Signs:    BP (!) 151/80   Pulse 70   Temp 97.5 °F (36.4 °C) (Oral)   Resp 16   Ht 1.6 m (5' 3\")   Wt 115.7 kg (255 lb)   SpO2 96%   BMI 45.17 kg/m²             Temp (24hrs), Av.5 °F (36.4 °C), Min:97.3 °F (36.3 °C), Max:97.9 °F (36.6 °C)       Intake/Output:   Last shift:      701 - 1900  In: -   Out: 700 [Urine:700]  Last 3 shifts: 1901 - 700  In: 330 [P.O.:320; I.V.:10]  Out: 800 [Urine:800]    Intake/Output Summary (Last 24 hours) at 2024 
Pt on 2L O2 at home    Pt wears AVAPS overnight. Daughter bringing in home machine. RT brought O2 bleed-in & sterile water for patient BiPAP use.    Pt vitals stable at this time--post nebulizer. Expiratory wheezing bilaterally.   
TRANSFER - IN REPORT:    Verbal report received from JOEY Rodriguez on Olayinka Jones  being received from ED for routine progression of patient care      Report consisted of patient's Situation, Background, Assessment and   Recommendations(SBAR).     Information from the following report(s) ED SBAR was reviewed with the receiving nurse.    Opportunity for questions and clarification was provided.      Assessment completed upon patient's arrival to unit and care assumed.    
complexity decision making was performed during this consultation and evaluation. Critical care time exclusive of procedures spent managing the patient:      minutes    Donna London MD    
11:45 AM   Result Value Ref Range    Procalcitonin 0.07 ng/mL   CBC with Auto Differential    Collection Time: 11/18/24 11:50 AM   Result Value Ref Range    WBC 5.9 4.6 - 13.2 K/uL    RBC 4.58 4.20 - 5.30 M/uL    Hemoglobin 13.6 12.0 - 16.0 g/dL    Hematocrit 40.7 35.0 - 45.0 %    MCV 88.9 78.0 - 100.0 FL    MCH 29.7 24.0 - 34.0 PG    MCHC 33.4 31.0 - 37.0 g/dL    RDW 13.6 11.6 - 14.5 %    Platelets 308 135 - 420 K/uL    MPV 8.6 (L) 9.2 - 11.8 FL    Nucleated RBCs 0.0 0  WBC    nRBC 0.00 0.00 - 0.01 K/uL    Neutrophils % 61 40 - 73 %    Lymphocytes % 30 21 - 52 %    Monocytes % 8 3 - 10 %    Eosinophils % 0 0 - 5 %    Basophils % 0 0 - 2 %    Immature Granulocytes % 1 (H) 0.0 - 0.5 %    Neutrophils Absolute 3.6 1.8 - 8.0 K/UL    Lymphocytes Absolute 1.8 0.9 - 3.6 K/UL    Monocytes Absolute 0.4 0.05 - 1.2 K/UL    Eosinophils Absolute 0.0 0.0 - 0.4 K/UL    Basophils Absolute 0.0 0.0 - 0.1 K/UL    Immature Granulocytes Absolute 0.0 0.00 - 0.04 K/UL    Differential Type AUTOMATED     CMP    Collection Time: 11/18/24 11:50 AM   Result Value Ref Range    Sodium 139 136 - 145 mmol/L    Potassium 4.4 3.5 - 5.5 mmol/L    Chloride 106 100 - 111 mmol/L    CO2 30 21 - 32 mmol/L    Anion Gap 3 3.0 - 18 mmol/L    Glucose 170 (H) 74 - 99 mg/dL    BUN 15 7.0 - 18 MG/DL    Creatinine 0.89 0.6 - 1.3 MG/DL    BUN/Creatinine Ratio 17 12 - 20      Est, Glom Filt Rate 72 >60 ml/min/1.73m2    Calcium 9.9 8.5 - 10.1 MG/DL    Total Bilirubin 0.8 0.2 - 1.0 MG/DL    ALT 33 13 - 56 U/L    AST 17 10 - 38 U/L    Alk Phosphatase 97 45 - 117 U/L    Total Protein 7.1 6.4 - 8.2 g/dL    Albumin 3.7 3.4 - 5.0 g/dL    Globulin 3.4 2.0 - 4.0 g/dL    Albumin/Globulin Ratio 1.1 0.8 - 1.7     Troponin    Collection Time: 11/18/24 11:50 AM   Result Value Ref Range    Troponin, High Sensitivity 12 0 - 54 ng/L   D-Dimer, Quantitative    Collection Time: 11/18/24 11:50 AM   Result Value Ref Range    D-Dimer, Quant 0.32 <0.46 ug/ml(FEU)   EKG 12 Lead

## 2024-11-21 NOTE — CARE COORDINATION
11/21/24 1215   IMM Letter   IMM Letter given to Patient/Family/Significant other/Guardian/POA/by: Irene Terrell RN   IMM Letter date given: 11/21/24   IMM Letter time given: 1210     Patient waived four hour right to appeal discharge.     Irene Terrell BSN, RN   Case Management   [phone]

## 2024-11-21 NOTE — DISCHARGE INSTRUCTIONS
can now view your medical record.     Additional Information  If you have questions, please contact your physician practice where you receive care. Remember, MyChart is NOT to be used for urgent needs. For medical emergencies, dial 911.     The discharge information has been reviewed with the {PATIENT PARENT GUARDIAN:[id number]}.  The {PATIENT PARENT GUARDIAN:71483} verbalized understanding.  Discharge medications reviewed with the {Dishcarge meds reviewed with:32063} and appropriate educational materials and side effects teaching were provided.  ___________________________________________________________________________________________________________________________________

## 2024-11-21 NOTE — PLAN OF CARE
Problem: Discharge Planning  Goal: Discharge to home or other facility with appropriate resources  11/19/2024 0037 by Yaima Waters, RN  Outcome: Progressing  11/19/2024 0013 by Yaima Waters RN  Outcome: Progressing  Flowsheets  Taken 11/19/2024 0013  Discharge to home or other facility with appropriate resources: Identify barriers to discharge with patient and caregiver  Taken 11/19/2024 0000  Discharge to home or other facility with appropriate resources: Identify barriers to discharge with patient and caregiver     Problem: Chronic Conditions and Co-morbidities  Goal: Patient's chronic conditions and co-morbidity symptoms are monitored and maintained or improved  Outcome: Progressing  Flowsheets (Taken 11/19/2024 0000)  Care Plan - Patient's Chronic Conditions and Co-Morbidity Symptoms are Monitored and Maintained or Improved: Monitor and assess patient's chronic conditions and comorbid symptoms for stability, deterioration, or improvement     Problem: Pain  Goal: Verbalizes/displays adequate comfort level or baseline comfort level  11/19/2024 0037 by Yaima Waters, RN  Outcome: Progressing  Note: Patient encouraged to utilize the Numeric Pain scale to report pain and comfort level.    11/19/2024 0013 by Yaima Waters, RN  Outcome: Progressing  Flowsheets (Taken 11/19/2024 0013)  Verbalizes/displays adequate comfort level or baseline comfort level: Encourage patient to monitor pain and request assistance  Note: Patient encouraged to utilize the Numeric Pain scale to report pain and acceptable comfort level.       
  Problem: Discharge Planning  Goal: Discharge to home or other facility with appropriate resources  11/21/2024 1220 by Jignesh Chavez, RN  Outcome: Progressing  Flowsheets  Taken 11/21/2024 1220  Discharge to home or other facility with appropriate resources:   Identify barriers to discharge with patient and caregiver   Arrange for needed discharge resources and transportation as appropriate  Taken 11/21/2024 0800  Discharge to home or other facility with appropriate resources: Identify barriers to discharge with patient and caregiver     Problem: Respiratory - Adult  Goal: Achieves optimal ventilation and oxygenation  11/21/2024 1220 by Jignesh Chavez, RN  Outcome: Progressing  Flowsheets (Taken 11/21/2024 0800)  Achieves optimal ventilation and oxygenation:   Assess for changes in respiratory status   Assess for changes in mentation and behavior  Note: Monitor oxygen levels, especially during mobility     Problem: Safety - Adult  Goal: Free from fall injury  11/21/2024 1220 by Jignesh Chavez, RN  Outcome: Progressing  Note: Encourage use of ambulatory aids  11/20/2024 2353 by Kong Gómez, RN  Outcome: Progressing     
  Problem: Discharge Planning  Goal: Discharge to home or other facility with appropriate resources  11/21/2024 1613 by Jignesh Chavez RN  Outcome: Completed  11/21/2024 1220 by Jignesh Chavez RN  Outcome: Progressing  Flowsheets  Taken 11/21/2024 1220  Discharge to home or other facility with appropriate resources:   Identify barriers to discharge with patient and caregiver   Arrange for needed discharge resources and transportation as appropriate  Taken 11/21/2024 0800  Discharge to home or other facility with appropriate resources: Identify barriers to discharge with patient and caregiver     Problem: Chronic Conditions and Co-morbidities  Goal: Patient's chronic conditions and co-morbidity symptoms are monitored and maintained or improved  11/21/2024 1613 by Jignesh Chavez RN  Outcome: Completed  11/21/2024 1220 by Jignesh Chavez RN  Outcome: Progressing  Flowsheets (Taken 11/21/2024 0800)  Care Plan - Patient's Chronic Conditions and Co-Morbidity Symptoms are Monitored and Maintained or Improved: Monitor and assess patient's chronic conditions and comorbid symptoms for stability, deterioration, or improvement     Problem: Pain  Goal: Verbalizes/displays adequate comfort level or baseline comfort level  Outcome: Completed  Flowsheets (Taken 11/21/2024 0812)  Verbalizes/displays adequate comfort level or baseline comfort level:   Assess pain using appropriate pain scale   Encourage patient to monitor pain and request assistance     Problem: Respiratory - Adult  Goal: Achieves optimal ventilation and oxygenation  11/21/2024 1613 by Jignesh Chavez RN  Outcome: Completed  11/21/2024 1220 by Jignesh Chavez RN  Outcome: Progressing  Flowsheets (Taken 11/21/2024 0800)  Achieves optimal ventilation and oxygenation:   Assess for changes in respiratory status   Assess for changes in mentation and behavior  Note: Monitor oxygen levels, especially during mobility     Problem: Safety - Adult  Goal: Free from fall 
  Problem: Discharge Planning  Goal: Discharge to home or other facility with appropriate resources  Outcome: Progressing     Problem: Chronic Conditions and Co-morbidities  Goal: Patient's chronic conditions and co-morbidity symptoms are monitored and maintained or improved  Outcome: Progressing     Problem: Pain  Goal: Verbalizes/displays adequate comfort level or baseline comfort level  Outcome: Progressing     Problem: Respiratory - Adult  Goal: Achieves optimal ventilation and oxygenation  Outcome: Progressing     Problem: Safety - Adult  Goal: Free from fall injury  Outcome: Progressing     Problem: Infection - Adult  Goal: Absence of infection at discharge  Outcome: Progressing     
  Problem: Pain  Goal: Verbalizes/displays adequate comfort level or baseline comfort level  11/19/2024 1552 by Jelani Ramirez RN  Outcome: Progressing  11/19/2024 1549 by Jelani Ramirez RN  Outcome: Progressing  Flowsheets (Taken 11/19/2024 1549)  Verbalizes/displays adequate comfort level or baseline comfort level: Encourage patient to monitor pain and request assistance  Note: Patient educated on pain medication availability per MAR for pain management. Pt  verbalized understanding and denies pain.  Pt tolerating pain with prn pain medication.       Problem: Respiratory - Adult  Goal: Achieves optimal ventilation and oxygenation  11/19/2024 1552 by Jelani Ramirez RN  Outcome: Progressing  11/19/2024 1549 by Jelani Ramirez RN  Outcome: Progressing  Flowsheets (Taken 11/19/2024 1549)  Achieves optimal ventilation and oxygenation: Assess for changes in respiratory status  Note: Patient on 2L nasal cannula, no signs of difficulty of breathing     Problem: Infection - Adult  Goal: Absence of infection at discharge  Outcome: Progressing  Flowsheets (Taken 11/19/2024 1552)  Absence of infection at discharge: Assess and monitor for signs and symptoms of infection  Note: Patient on oral antibiotic, no fever     Problem: Safety - Adult  Goal: Free from fall injury  11/19/2024 1552 by Jelani Ramirez RN  Outcome: Progressing  11/19/2024 1552 by Jelani Ramirez RN  Outcome: Progressing  Flowsheets (Taken 11/19/2024 1551)  Free From Fall Injury: Instruct family/caregiver on patient safety  Note: Educate to use call bell     
lines, tubes and drains   Administer medications as ordered   Instruct and encourage patient and family to use good hand hygiene technique  Note: -pt on Levaquin  -encouraged hand hygiene  -labs per order     Problem: Safety - Adult  Goal: Free from fall injury  11/20/2024 0027 by Lennie Aguilera, RN  Outcome: Progressing  Flowsheets (Taken 11/20/2024 0014)  Free From Fall Injury: Instruct family/caregiver on patient safety  Note: -pt's self care  -ambulates independently

## 2024-11-21 NOTE — CARE COORDINATION
D/C order noted for today. Orders reviewed. No needs identified at this time.  remains available if needed.    Irene Terrell, KATHERINEN, RN  Case Management   [phone]

## 2024-12-04 ENCOUNTER — HOSPITAL ENCOUNTER (OUTPATIENT)
Facility: HOSPITAL | Age: 64
Setting detail: INFUSION SERIES
End: 2024-12-04

## 2024-12-09 ENCOUNTER — TELEPHONE (OUTPATIENT)
Age: 64
End: 2024-12-09

## 2024-12-09 NOTE — TELEPHONE ENCOUNTER
Pt would like to  speak with nurse in regards to her fasenra. She is currently in the hosp d/t having shingles and would like to know if any other medication would interfere. Please advise [phone]

## 2024-12-09 NOTE — TELEPHONE ENCOUNTER
Spoke with the patient and advised the medication she was given for the shingles should not interact with her facendra injection.

## 2024-12-11 ENCOUNTER — HOSPITAL ENCOUNTER (OUTPATIENT)
Facility: HOSPITAL | Age: 64
Setting detail: INFUSION SERIES
Discharge: HOME | End: 2024-12-14
Payer: MEDICARE

## 2024-12-11 VITALS
TEMPERATURE: 97.6 F | HEART RATE: 86 BPM | OXYGEN SATURATION: 94 % | DIASTOLIC BLOOD PRESSURE: 82 MMHG | SYSTOLIC BLOOD PRESSURE: 132 MMHG | RESPIRATION RATE: 18 BRPM

## 2024-12-11 DIAGNOSIS — J45.51 SEVERE PERSISTENT ASTHMA WITH EXACERBATION: Primary | ICD-10-CM

## 2024-12-11 PROCEDURE — 6360000002 HC RX W HCPCS: Performed by: INTERNAL MEDICINE

## 2024-12-11 PROCEDURE — 96372 THER/PROPH/DIAG INJ SC/IM: CPT

## 2024-12-11 RX ORDER — ONDANSETRON 2 MG/ML
8 INJECTION INTRAMUSCULAR; INTRAVENOUS
OUTPATIENT
Start: 2025-01-29

## 2024-12-11 RX ORDER — GABAPENTIN 300 MG/1
1 CAPSULE ORAL 3 TIMES DAILY
COMMUNITY
Start: 2024-12-10

## 2024-12-11 RX ORDER — SODIUM CHLORIDE 9 MG/ML
INJECTION, SOLUTION INTRAVENOUS CONTINUOUS
OUTPATIENT
Start: 2025-01-29

## 2024-12-11 RX ORDER — HYDROCORTISONE SODIUM SUCCINATE 100 MG/2ML
100 INJECTION INTRAMUSCULAR; INTRAVENOUS
OUTPATIENT
Start: 2025-01-29

## 2024-12-11 RX ORDER — ACYCLOVIR 200 MG/1
CAPSULE ORAL
COMMUNITY
Start: 2024-12-03

## 2024-12-11 RX ORDER — EPINEPHRINE 1 MG/ML
0.3 INJECTION, SOLUTION, CONCENTRATE INTRAVENOUS PRN
OUTPATIENT
Start: 2025-01-29

## 2024-12-11 RX ORDER — ACETAMINOPHEN 325 MG/1
650 TABLET ORAL
OUTPATIENT
Start: 2025-01-29

## 2024-12-11 RX ORDER — DIPHENHYDRAMINE HYDROCHLORIDE 50 MG/ML
50 INJECTION INTRAMUSCULAR; INTRAVENOUS
OUTPATIENT
Start: 2025-01-29

## 2024-12-11 RX ORDER — ALBUTEROL SULFATE 90 UG/1
4 INHALANT RESPIRATORY (INHALATION) PRN
OUTPATIENT
Start: 2025-01-29

## 2024-12-11 RX ADMIN — BENRALIZUMAB 30 MG: 30 INJECTION, SOLUTION SUBCUTANEOUS at 14:18

## 2024-12-11 ASSESSMENT — PAIN - FUNCTIONAL ASSESSMENT: PAIN_FUNCTIONAL_ASSESSMENT: 0-10

## 2024-12-11 ASSESSMENT — PAIN DESCRIPTION - DESCRIPTORS: DESCRIPTORS: SHARP;BURNING

## 2024-12-11 NOTE — PROGRESS NOTES
[facility] Progress Note    Date: 2024    Name: Olayinka Jones    MRN: [id number]         : 1960    Ms. Jones arrived in the [facility] today at 1350 ambulatory with assistance of a walker, and in stable condition, here for her FASENRA INJECTION (EVERY 8 WEEKS).  She was assessed and education was provided.     Ms. Jones's vitals were reviewed.  Vitals:    24 1350   BP: 132/82   Pulse: 86   Resp: 18   Temp: 97.6 °F (36.4 °C)   SpO2: 94%     Ms. Jones reports she has a shingles rash to the right side of her back, states it is no longer leaking fluid and is healing well. She reports calling referring provider's office two days ago to ensure she was able to receive her Fasenra injection today and she was told it is ok to receive today. Called and discussed with Bret Aguilar RPH and he reports no contraindications to receiving Fasenra injection today.    Benralizumab (FASENRA) 30 mg was administered SQ, in the back of her RIGHT ARM per order and without incident.     Ms. Jones tolerated the injection well and voiced no complaints.     Discharge instructions reviewed with patient and she expressed understanding. Her armband was removed and shredded.     [facility] and with assistance of a walker, and in stable condition at 1420.     She is to return in 8 weeks on 25 at 1400 for her next FASENRA Injection.       Cindy Marley RN  2024  1:57 PM

## 2024-12-23 ENCOUNTER — OFFICE VISIT (OUTPATIENT)
Age: 64
End: 2024-12-23
Payer: MEDICARE

## 2024-12-23 VITALS
TEMPERATURE: 97.5 F | SYSTOLIC BLOOD PRESSURE: 138 MMHG | WEIGHT: 256.6 LBS | DIASTOLIC BLOOD PRESSURE: 61 MMHG | HEIGHT: 63 IN | BODY MASS INDEX: 45.46 KG/M2 | OXYGEN SATURATION: 93 % | RESPIRATION RATE: 22 BRPM | HEART RATE: 95 BPM

## 2024-12-23 DIAGNOSIS — Z92.241 STEROID-DEPENDENT COPD (HCC): ICD-10-CM

## 2024-12-23 DIAGNOSIS — J44.9 STEROID-DEPENDENT COPD (HCC): ICD-10-CM

## 2024-12-23 DIAGNOSIS — J44.9 COPD, GROUP D, BY GOLD 2017 CLASSIFICATION (HCC): ICD-10-CM

## 2024-12-23 PROCEDURE — 99215 OFFICE O/P EST HI 40 MIN: CPT | Performed by: HOSPITALIST

## 2024-12-23 PROCEDURE — 3075F SYST BP GE 130 - 139MM HG: CPT | Performed by: HOSPITALIST

## 2024-12-23 PROCEDURE — 3078F DIAST BP <80 MM HG: CPT | Performed by: HOSPITALIST

## 2024-12-23 PROCEDURE — 1124F ACP DISCUSS-NO DSCNMKR DOCD: CPT | Performed by: HOSPITALIST

## 2024-12-23 RX ORDER — AZITHROMYCIN 250 MG/1
250 TABLET, FILM COATED ORAL
Qty: 40 TABLET | Refills: 3 | Status: SHIPPED | OUTPATIENT
Start: 2024-12-23

## 2024-12-23 RX ORDER — FLUTICASONE PROPIONATE AND SALMETEROL XINAFOATE 230; 21 UG/1; UG/1
2 AEROSOL, METERED RESPIRATORY (INHALATION) 2 TIMES DAILY
Qty: 3 EACH | Refills: 3 | Status: SHIPPED | OUTPATIENT
Start: 2024-12-23

## 2024-12-23 ASSESSMENT — PATIENT HEALTH QUESTIONNAIRE - PHQ9
SUM OF ALL RESPONSES TO PHQ QUESTIONS 1-9: 0
1. LITTLE INTEREST OR PLEASURE IN DOING THINGS: NOT AT ALL
2. FEELING DOWN, DEPRESSED OR HOPELESS: NOT AT ALL
SUM OF ALL RESPONSES TO PHQ QUESTIONS 1-9: 0
SUM OF ALL RESPONSES TO PHQ9 QUESTIONS 1 & 2: 0
SUM OF ALL RESPONSES TO PHQ QUESTIONS 1-9: 0
SUM OF ALL RESPONSES TO PHQ QUESTIONS 1-9: 0

## 2024-12-23 NOTE — PROGRESS NOTES
Olayinka Fergusonman presents today for   Chief Complaint   Patient presents with    COPD       Is someone accompanying this pt? no    Is the patient using any DME equipment during OV? oxygen    [employer] adapt    Depression Screenin/23/2024     2:27 PM   PHQ-9 Questionaire   Little interest or pleasure in doing things 0   Feeling down, depressed, or hopeless 0   PHQ-9 Total Score 0       Learning Assessment:    Failed to redirect to the Timeline version of the [employer].    Abuse Screening:         No data to display                Fall Risk    Failed to redirect to the Timeline version of the [employer].    Coordination of Care:    1. Have you been to the ER, urgent care clinic since your last visit? Hospitalized since your last visit? November [facility]    2. Have you seen or consulted any other health care providers outside of the [facility] since your last visit? Include any pap smears or colon screening. no    Medication list has been update per patient.

## 2025-01-10 NOTE — ROUTINE PROCESS
Bedside shift change report given to Me (oncoming nurse) by Kilo Brown (offgoing nurse). Report included the following information SBAR, Kardex, MAR and Med Rec Status. Patient is sitting up in the chair. Voiced no complaints. Call bell is within reach. 0030 Medication administered per MD orders. Patient resting quietly with her eyes closed. CPAP is intact. 0330 Vital signs obtained. Patient denies pain. Call bell is within reach. 0430 Patient is up to the chair, playing on her tablet. Voiced no complaints. Call bell is within reach. 0745 Bedside shift change report given to [facility] (oncoming nurse) by Me (offgoing nurse). Report included the following information SBAR, Kardex, ED Summary, [facility] and Recent Results. TELEMEDICINE - VIDEO VISIT  You have chosen to receive care through a telehealth visit.  Do you consent to use a video/audio connection for your medical care today? Yes    Location of patient:Home  Location of Provider: [facility]   Anyone else present: No  Type of Technology used: Tw[employer] through use of Epic/Gaia Interactivehart visit    CHIEF COMPLAINT  Back and joint pain     Subjective   Anne Jewell is a 73 y.o. female  who presents for a video visit follow-up. She has a history of chronic back and joint pain. She reports that her pain level has fluctuated since her last office visit and varies based on activity level and weather changes.      Today pain is 8/10VAS in severity. Pain is located in her low back and radiates to bilateral feet. Describes this pain as a nearly continuous aching. Pain is worsened by rising from sitting to standing position, stress, weather changes, prolonged position, and walking long distances. Pain improves with rest/reposition, HEP, aqua therapy, heat/ice, and medications. She has completed home PT and OT in the past. She has been using an under the desk elliptical and feels like this has made her legs stronger.      Continues with OxyContin 80mg 2 tablets BID and Baclofen 10mg 2-3/day (managed by PCP). Denies any side effects from the regimen, including constipation and somnolence. The regimen helps decrease pain by a significant amount and allows her to remain active. Patient reports that she takes Xanax 2mg TID as needed. This is prescribed by psychiatrist.      History of adenocarcinoma. PET scan on 10/6/23 showed no evidence of metastatic disease. No plans for chemotherapy at this time per office note from Dr. Slava Loo from 10/12/23.      Anticoagulated on Xarelto     Back Pain  This is a chronic problem. The current episode started more than 1 year ago. The problem occurs constantly. The problem is unchanged (pain level has fluctuated since her last office  visit). The pain is present in the lumbar spine. The quality of the pain is described as aching. The pain radiates to the right foot and left foot. The pain is at a severity of 8/10. The pain is moderate. The symptoms are aggravated by standing, position and sitting (rising from sitting to standing, prolonged position, walking long distances, stress, weather changes). Pertinent negatives include no abdominal pain, chest pain, dysuria, fever, headaches, numbness or weakness. She has tried analgesics, home exercises, heat and ice (aqua therapy) for the symptoms. The treatment provided moderate relief.   Joint Pain  This is a chronic (8/10 VAS in severity) problem. The current episode started more than 1 year ago. The problem occurs constantly. The problem has been unchanged (pain level has fluctuated since her last office visit). Associated symptoms include arthralgias, myalgias and neck pain. Pertinent negatives include no abdominal pain, chest pain, chills, congestion, coughing, fatigue, fever, headaches, numbness, vertigo, vomiting or weakness. The symptoms are aggravated by stress and exertion (weather changes, increased physical activity,). She has tried oral narcotics, position changes, rest, heat and ice for the symptoms. The treatment provided mild relief.      The following portions of the patient's history were reviewed and updated as appropriate: allergies, current medications, past family history, past medical history, past social history, past surgical history, and problem list.    Review of Systems   Constitutional:  Negative for chills, fatigue and fever.   HENT:  Negative for congestion.    Respiratory:  Negative for cough.    Cardiovascular:  Negative for chest pain.   Gastrointestinal:  Negative for abdominal pain and vomiting.   Genitourinary:  Negative for dysuria.   Musculoskeletal:  Positive for arthralgias, back pain, myalgias and neck pain.   Neurological:  Negative for vertigo, weakness,  numbness and headaches.     I have reviewed and confirmed the accuracy of the ROS as documented by the MA/LPN/RN Sara RJ Gr, APRN    Vitals:    01/10/25 1027   PainSc:   8   PainLoc: Back     Objective   Physical Exam  Constitutional:       Appearance: Normal appearance.   HENT:      Head: Normocephalic.   Neurological:      General: No focal deficit present.      Mental Status: She is alert and oriented to person, place, and time.   Psychiatric:         Mood and Affect: Mood normal.         Behavior: Behavior normal.         Thought Content: Thought content normal.         Cognition and Memory: Cognition normal.       Assessment & Plan   Diagnoses and all orders for this visit:    1. Lumbar radiculopathy (Primary)    2. Chronic pain syndrome    3. Gastric adenocarcinoma    4. Rheumatoid arthritis, involving unspecified site, unspecified whether rheumatoid factor present    5. Generalized osteoarthritis    6. Degeneration of intervertebral disc of lumbar region with discogenic back pain    7. Encounter for long-term (current) use of high-risk medication      --- The urine drug screen confirmation from 11/4/24 has been reviewed and the result is appropriate based on patient history and [facility] report  --- The patient signed an updated copy of the controlled substance agreement on 11/4/24   --- Narcan prescription current   --- Continue OxyContin. DNF 1/11/25. Patient appears stable with current regimen. No adverse effects. Regarding continuation of opioids, there is no evidence of aberrant behavior or any red flags.  The patient continues with appropriate response to opioid therapy. ADL's remain intact by self.   --- Follow-up 1 month      [facility] REPORT  As part of the patient's treatment plan, I am prescribing controlled substances. The patient has been made aware of appropriate use of such medications, including potential risk of somnolence, limited ability to drive and/or work safely, and the potential for  dependence or overdose. It has also been made clear that these medications are for use by this patient only, without concomitant use of alcohol or other substances unless prescribed.     Patient has completed prescribing agreement detailing terms of continued prescribing of controlled substances, including monitoring [facility] reports, urine drug screening, and pill counts if necessary. The patient is aware that inappropriate use will results in cessation of prescribing such medications.    As the clinician, I personally reviewed the V[facility]ENTA from 1/10/25 while the patient was in the office today.    History and physical exam exhibit continued safe and appropriate use of controlled substances.  -------  Dictated utilizing Dragon Dictation

## 2025-01-13 NOTE — PROGRESS NOTES
[facility]  Pulmonary, Critical Care, and Sleep Medicine      Pulmonary  Progress Note    Name: Damian James     : 1960     Date: 10/11/2018        Subjective:     10/11/18   Feels improved this am- coughing more productively - clear mucus and feels significantly improved  Wore and tolerated CPAP overnight  Denies any new complaints  Sitting in bed. Ambulated hallways without O2    HPI:  Patient is a 60 y.o. female  with PMHx of Asthma- COPD (with four hospitalizations this year),  insulin dependent DM, hypertension and GERD who presented on 10/7/18 with worsening dyspnea.       Patient was treated in clinic on  18 for a mild COPD exacerbation. Her symptoms initially improved with the PO Prednisone taper she was started on but worsened two nights ago on 10/5/18. Patient stated that her symptoms significantly deteriorated after she started to lower the Prednisone taper down to 10 mg daily. She stated that her chest was feeling tight and that her breathing became worse which prompted her to come to the ER. She has been battling with frequent exacerbations in past 10 months. 5 months/10 she has had some degree of exacerbation. Recently her PCP instructed her to hold the Incruse and Advair and use nebulized Budesenide. , duonebs at home. She has home O2 at 2 L. She has had persistent cough with occasional yellow mucus, thick white to foamy mucus and some increase in Sinus congestion. Denies GERD  No fevers or chills. She endorsed chest tightness and symptoms of a racing heart. She endorsed SOB and a minimally productive cough. Endorsed slight nausea but no vomiting. In July she had IgE checked- 157. Allergen profile - Negative      Asthma History;  Frequency of symptoms: almost daily Cough, intermittent wheezing, in day time and night time with seasonal- spring and fall worsening  Triggers: environmental, smoke, dust,   No PET's.  No change in home environment  Relief How Severe Are Your Spot(S)?: moderate What Type Of Note Output Would You Prefer (Optional)?: Standard Output from-  less than twice a week and daily  Peak Flows: personal best  H/o ER visits  No H/o previous intubation  H/o nasal congestion, postnasal drainage, sneezing  H/o itchy eyes, skin rash, itching  H/o nasal /sinus surgery  No H/o Aspirin allergy  No H/o travel  No H/o smoking, recreational drug use  H/o Sleep related symptoms- snoring, awakening due to SOB, choking, wheezing    Occupational: no exposures    Past Medical History:   Diagnosis Date    Asthma     Chronic lung disease     COPD     Cystocele, midline     Diabetes mellitus ([address]     GERD (gastroesophageal reflux disease)     Hidradenitis suppurativa     Hyperlipidemia     Hypertension     SHERRIE on CPAP     CPAP    Stress incontinence      Past Surgical History:   Procedure Laterality Date    BREAST SURGERY PROCEDURE UNLISTED      Right breast benign tumor removal    HX APPENDECTOMY      HX CHOLECYSTECTOMY      HX DILATION AND CURETTAGE      Dysfunctional uterine bleeding, thought 2/2 fibroids    HX TUBAL LIGATION       Allergies   Allergen Reactions    Ancef [Cefazolin] Hives    Contrast Agent [Iodine] Anaphylaxis, Shortness of Breath and Swelling     Needs pre-medication for IV contrast with Benadryl, Solu-Medrol    Fish Containing Products Anaphylaxis     Pt states she had a severe allergic reaction at 7 y/o.  Metformin Other (comments)     Abdominal pain, diarrhea.     Codeine Other (comments)     Altered mental status     Current Facility-Administered Medications   Medication Dose Route Frequency Provider Last Rate Last Dose    influenza vaccine 2018-19 (6 mos+)(PF) (FLUARIX QUAD/FLULAVAL QUAD) injection 0.5 mL  0.5 mL IntraMUSCular PRIOR TO DISCHARGE Dee Dee Gifford MD        pneumococcal 23-valent (PNEUMOVAX 23) injection 0.5 mL  0.5 mL IntraMUSCular PRIOR TO DISCHARGE Dee Dee Gifford MD        [facility]) tablet 250 mg  250 mg Oral DAILY Tere Umanzor MD   250 mg at 10/11/18 1035    lisinopril (Seretha Siad) What Is The Reason For Today's Visit?: Full Body Skin Examination tablet 20 mg  20 mg Oral ACB/HS Gonzalo Camargo, DO   20 mg at 10/11/18 0744    budesonide (PULMICORT) 1,000 mcg/2 mL nebulizer susp  1,000 mcg Nebulization BID RT Koffi Rouse MD   1,000 mcg at 10/11/18 0900    arformoterol (BROVANA) neb solution 15 mcg  15 mcg Nebulization BID RT Koffi Rouse MD   15 mcg at 10/11/18 0901    tiotropium (SPIRIVA) inhalation capsule 18 mcg  1 Cap Inhalation DAILY Koffi Rouse MD   18 mcg at 10/11/18 0900    aspirin delayed-release tablet 81 mg  81 mg Oral DAILY Fede Spence MD   81 mg at 10/11/18 0829    famotidine (PEPCID) tablet 20 mg  20 mg Oral BID Fede Spence MD   20 mg at 10/11/18 0829    fluticasone (FLONASE) 50 mcg/actuation nasal spray 2 Spray  2 Spray Both Nostrils DAILY Fede Spence MD   2 Spray at 10/11/18 0837    pantoprazole (PROTONIX) tablet 40 mg  40 mg Oral DAILY Fede Spence MD   40 mg at 10/11/18 0829    simethicone (MYLICON) tablet 80 mg  80 mg Oral Q6H PRN Fede Spence MD        acetaminophen (TYLENOL) tablet 650 mg  650 mg Oral Q4H PRN Fede Spence MD   650 mg at 10/08/18 1750    enoxaparin (LOVENOX) injection 40 mg  40 mg SubCUTAneous Q24H Fede Spence MD   40 mg at 10/11/18 0829    montelukast (SINGULAIR) tablet 10 mg  10 mg Oral QHS Mary Hogue MD   10 mg at 10/10/18 2154    insulin glargine (LANTUS) injection 30 Units  30 Units SubCUTAneous QHS Mary Hogue MD   30 Units at 10/10/18 2152    insulin lispro (HUMALOG) injection   SubCUTAneous AC&HS Fede Spence MD   9 Units at 10/11/18 1130    glucose chewable tablet 16 g  4 Tab Oral PRN Fede Spence MD        glucagon [facility]) injection 1 mg  1 mg IntraMUSCular PRN Fede Spence MD        dextrose (D50W) injection syrg 12.5-25 g  25-50 mL IntraVENous PRN Fede Spence MD             Review of Systems:  HEENT: No epistaxis, no nasal drainage, no difficulty in swallowing, no redness in eyes  Respiratory: as above  Cardiovascular: no chest pain, no palpitations, no chronic leg edema, no syncope  Gastrointestinal: no abd pain, no vomiting, no diarrhea, no bleeding symptoms  Genitourinary: No urinary symptoms or hematuria  Integument/breast: No ulcers or rashes  Musculoskeletal:Neg  Neurological: No focal weakness, no seizures, no headaches  Behvioral/Psych: No anxiety, no depression  Constitutional: No fever, no chills, no weight loss, no night sweats     Objective:     Visit Vitals    /76 (BP 1 Location: Right arm, BP Patient Position: Sitting)    Pulse 67    Temp 96.6 °F (35.9 °C)    Resp 18    Ht 5' 3\" (1.6 m)    Wt 114.3 kg (252 lb)    SpO2 96%    BMI 44.64 kg/m2        Physical Exam:   GENERAL: well developed and in  moderate distress  HEENT:  PERRL, EOMI, no alar flaring or epistaxis, oral mucosa moist without cyanosis  NECK:  no jugular vein distention, no retractions, no thyromegaly or masses  LUNGS:  No wheezes , decreased intensity breath sounds bilaterally  HEART:  Regular rate and rhythm with no M,G,R; no edema is present  ABDOMEN:  soft with no tenderness, bowel sounds present  EXTREMITIES:  warm with no cyanosis  SKIN:  no jaundice or ecchymosis  NEUROLOGIC:  alert and oriented, grossly non-focal    Data review:     Blood Eosinophils: no eosinophilia  IgE level: 157  Allergy testing:negative  [facility] allergen panel:negative  Sputum eosinophils: not done    PFT:  Date FEV1/FVC FEV1%Best [id number]% FVC%best TLC% RV% VC% DLCO%   5/27/2016 54 1.32/51% 0.42/17% 2.54/77% 111 202 62 69                Methacholine challenge: not done    Imaging:  I have personally reviewed the patients radiographs and have reviewed the reports:  XR Results (most recent):    Results from Hospital Encounter encounter on 10/07/18   XR CHEST PA LAT   Narrative EXAM:  XR CHEST PA LAT    INDICATION:   SOB    COMPARISON: None. FINDINGS:  Stable mild enlargement of the cardiac silhouette. Pulmonary vasculature  unremarkable.  No pneumothorax or pleural effusions. Streaky opacities in the  bases. Mild thoracic spondylosis. .         Impression Impression:    Stable mild enlargement of the cardiac silhouette. Basilar streaky opacities  suggesting atelectasis versus less likely infiltrate. IMPRESSION:   · Asthma- COPD overlap syndrome- difficult to control T2 low. Severity- moderate with poor Control. Has had several hospitalizations over the past year for COPD-Asthma exacerbations (this is the 4th this year) -Admitted for acute exacerbation failed outpatient therapy with oral steroids. No evidence for infection and has completed course of appropriate antibiotics. Triggers:environmental allergens. ? 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 EVMS- ENT following  · Comorbid conditions- Obesity, DM           · Will continue to optimize in hospital treatment with nebulized bronchodilators, steroids and stepped up Bronchial hygiene protocol  · Use multi targeted trigger optimization strategy  · Will start  chronic Macrolide therapy - zithromax 250mg daily X 12 weeks  · IgE, allergen profile reviewed. Not a candidate for biologics  · Trigger control- rhinitis, GERD, environmental allergens,airway inflammation  · Continue Flonase  · Discussed concept of controllers, rescue  · Advair and Incruse Ellipta can be resumed at discharge- explained rationale to patient  · Rescue inhaler-albuterol  · Peak flow monitoring  · Action plan given with discussion about Green, Yellow, Red zone actions  · Oxygen and CPAP for home use  · Preventive vaccinations: Influenza, Pneumovax  · Ambulate   · Will follow up in clinic       Health maintenance screens deferred to Primary care provider.     Moderate complexity decision making was performed during the evaluation of this patient at high risk for decompensation with multiple organ involvement          Iesha Cleary MD

## 2025-01-29 ENCOUNTER — APPOINTMENT (OUTPATIENT)
Facility: HOSPITAL | Age: 65
End: 2025-01-29
Payer: MEDICARE

## 2025-01-30 DIAGNOSIS — J44.9 CHRONIC OBSTRUCTIVE PULMONARY DISEASE, UNSPECIFIED COPD TYPE (HCC): Primary | ICD-10-CM

## 2025-02-05 ENCOUNTER — HOSPITAL ENCOUNTER (OUTPATIENT)
Facility: HOSPITAL | Age: 65
Setting detail: INFUSION SERIES
Discharge: HOME | End: 2025-02-08
Payer: MEDICARE

## 2025-02-05 VITALS
DIASTOLIC BLOOD PRESSURE: 85 MMHG | TEMPERATURE: 98.1 F | SYSTOLIC BLOOD PRESSURE: 145 MMHG | HEART RATE: 84 BPM | OXYGEN SATURATION: 94 % | RESPIRATION RATE: 18 BRPM

## 2025-02-05 DIAGNOSIS — J45.51 SEVERE PERSISTENT ASTHMA WITH EXACERBATION: Primary | ICD-10-CM

## 2025-02-05 PROCEDURE — 96372 THER/PROPH/DIAG INJ SC/IM: CPT

## 2025-02-05 PROCEDURE — 6360000002 HC RX W HCPCS: Performed by: INTERNAL MEDICINE

## 2025-02-05 RX ORDER — HYDROCORTISONE SODIUM SUCCINATE 100 MG/2ML
100 INJECTION INTRAMUSCULAR; INTRAVENOUS
OUTPATIENT
Start: 2025-04-02

## 2025-02-05 RX ORDER — EPINEPHRINE 1 MG/ML
0.3 INJECTION, SOLUTION, CONCENTRATE INTRAVENOUS PRN
OUTPATIENT
Start: 2025-04-02

## 2025-02-05 RX ORDER — DIPHENHYDRAMINE HYDROCHLORIDE 50 MG/ML
50 INJECTION INTRAMUSCULAR; INTRAVENOUS
OUTPATIENT
Start: 2025-04-02

## 2025-02-05 RX ORDER — ALBUTEROL SULFATE 90 UG/1
4 INHALANT RESPIRATORY (INHALATION) PRN
OUTPATIENT
Start: 2025-04-02

## 2025-02-05 RX ORDER — ACETAMINOPHEN 325 MG/1
650 TABLET ORAL
OUTPATIENT
Start: 2025-04-02

## 2025-02-05 RX ORDER — SODIUM CHLORIDE 9 MG/ML
INJECTION, SOLUTION INTRAVENOUS CONTINUOUS
OUTPATIENT
Start: 2025-04-02

## 2025-02-05 RX ORDER — ONDANSETRON 2 MG/ML
8 INJECTION INTRAMUSCULAR; INTRAVENOUS
OUTPATIENT
Start: 2025-04-02

## 2025-02-05 RX ADMIN — BENRALIZUMAB 30 MG: 30 INJECTION, SOLUTION SUBCUTANEOUS at 14:24

## 2025-02-05 ASSESSMENT — PAIN - FUNCTIONAL ASSESSMENT: PAIN_FUNCTIONAL_ASSESSMENT: 0-10

## 2025-02-05 ASSESSMENT — PAIN DESCRIPTION - DESCRIPTORS: DESCRIPTORS: BURNING

## 2025-02-05 NOTE — DIABETES MGMT
GLYCEMIC CONTROL:    A1c 8.4% 5/22/2020  Completed assessment of home diabetes mgt and education, 7/23/2020  Home diabetes medications:  Basaglar (lantus) pen insulin 45 units daily at night  Novolog meal time lispro TID pen insulin        Current diabetes medications:  Basal lantus insulin 45 units daily, first dose ordered 7/23/2020  Correctional lispro insulin ACHS.  Very resistant dose    Total daily dose insulin previous day: 7/22/2020  Lantus: None  Lispro: 12 units    Recommendation: basal and sliding scale lispro insulin as ordered    Diet: diabetic consistent carb regular    Connor Dean RN [facility]  Pager: [phone] O positive

## 2025-02-05 NOTE — PROGRESS NOTES
[facility] Progress Note    Date: 2025    Name: Olayinka Jones    MRN: [id number]         : 1960    Ms. Jones arrived in the [facility] today at 1410 ambulatory with assistance of a walker, and in stable condition, here for her FASENRA INJECTION (EVERY 8 WEEKS).  She was assessed and education was provided.     Ms. Jones's vitals were reviewed.  Vitals:    25 1413   BP: (!) 145/85   Pulse: 84   Resp: 18   Temp: 98.1 °F (36.7 °C)   SpO2: 94%     Patient reports recovering well since recent hospital visit where she was dx with pneumonia. She does report some continued pain to her right flank radiating into her abdomen that she reports has been there since her shingles dx and is slowly improving.    Benralizumab (FASENRA) 30 mg was administered SQ, in the back of her LEFT ARM per order and without incident.     Ms. Jones tolerated the injection well and voiced no complaints.     Discharge instructions reviewed with patient and she expressed understanding. Her armband was removed and shredded.     [facility] ambulatory and with assistance of a walker, and in stable condition at 1425.     She is to return in 8 weeks on 25 at 1400 for her next FASENRA Injection.       Cindy Marley RN  2025  2:21 PM

## 2025-04-02 ENCOUNTER — HOSPITAL ENCOUNTER (OUTPATIENT)
Facility: HOSPITAL | Age: 65
Setting detail: INFUSION SERIES
Discharge: HOME | End: 2025-04-05
Payer: MEDICARE

## 2025-04-02 VITALS
DIASTOLIC BLOOD PRESSURE: 72 MMHG | TEMPERATURE: 97.5 F | SYSTOLIC BLOOD PRESSURE: 166 MMHG | RESPIRATION RATE: 24 BRPM | OXYGEN SATURATION: 96 % | HEART RATE: 78 BPM

## 2025-04-02 DIAGNOSIS — J45.51 SEVERE PERSISTENT ASTHMA WITH EXACERBATION (HCC): Primary | ICD-10-CM

## 2025-04-02 PROCEDURE — 96372 THER/PROPH/DIAG INJ SC/IM: CPT

## 2025-04-02 PROCEDURE — 6360000002 HC RX W HCPCS: Performed by: INTERNAL MEDICINE

## 2025-04-02 RX ORDER — DIPHENHYDRAMINE HYDROCHLORIDE 50 MG/ML
50 INJECTION, SOLUTION INTRAMUSCULAR; INTRAVENOUS
OUTPATIENT
Start: 2025-05-28

## 2025-04-02 RX ORDER — EPINEPHRINE 1 MG/ML
0.3 INJECTION, SOLUTION, CONCENTRATE INTRAVENOUS PRN
OUTPATIENT
Start: 2025-05-28

## 2025-04-02 RX ORDER — HYDROCORTISONE SODIUM SUCCINATE 100 MG/2ML
100 INJECTION INTRAMUSCULAR; INTRAVENOUS
OUTPATIENT
Start: 2025-05-28

## 2025-04-02 RX ORDER — ACETAMINOPHEN 325 MG/1
650 TABLET ORAL
OUTPATIENT
Start: 2025-05-28

## 2025-04-02 RX ORDER — ALBUTEROL SULFATE 90 UG/1
4 INHALANT RESPIRATORY (INHALATION) PRN
OUTPATIENT
Start: 2025-05-28

## 2025-04-02 RX ORDER — ONDANSETRON 2 MG/ML
8 INJECTION INTRAMUSCULAR; INTRAVENOUS
OUTPATIENT
Start: 2025-05-28

## 2025-04-02 RX ORDER — SODIUM CHLORIDE 9 MG/ML
INJECTION, SOLUTION INTRAVENOUS CONTINUOUS
OUTPATIENT
Start: 2025-05-28

## 2025-04-02 RX ADMIN — BENRALIZUMAB 30 MG: 30 INJECTION, SOLUTION SUBCUTANEOUS at 14:36

## 2025-04-02 ASSESSMENT — PAIN DESCRIPTION - DESCRIPTORS: DESCRIPTORS: BURNING;TINGLING

## 2025-04-02 ASSESSMENT — PAIN - FUNCTIONAL ASSESSMENT: PAIN_FUNCTIONAL_ASSESSMENT: 0-10

## 2025-04-02 NOTE — PROGRESS NOTES
\[facility]\"" Progress Note    Date: 2025    Name: Olayinka Jones    MRN: [id number]         : 1960    Ms. Jones arrived in the \[facility]\"" today at 1415, ambulatory and in stable condition, here for her FASENRA INJECTION (EVERY 8 WEEKS).  She was assessed and education was provided.     Ms. Jones's vitals were reviewed.  Vitals:    25 1415   BP: (!) 166/72   Pulse: 78   Resp: 24   Temp: 97.5 °F (36.4 °C)   SpO2: 96%         Ms. Jones stated that she was doing fairly well, and was tolerating the FASENRA injections well, and  she voiced no major complaints.    She denied having any current signs of infection, but stated that she is on a continuous prophylactic Zithromax regimen that she takes 3 times per week.       .  Benralizumab (FASENRA) 30 mg, was administered SQ, in the back of her LEFT ARM at 1436, per order and without incident.            Ms. Jones tolerated well and voiced no complaints.      [facility] in [facility] condition at 1440.     She is to return in 8 weeks, on Wednesday, 25 at 1400, for her next appointment, for her next FASENRA Injection.       Radha Saunders RN  2025  2:36 PM

## 2025-04-22 ENCOUNTER — HOSPITAL ENCOUNTER (OUTPATIENT)
Facility: HOSPITAL | Age: 65
Setting detail: RECURRING SERIES
Discharge: HOME | End: 2025-04-25
Payer: MEDICARE

## 2025-04-22 PROCEDURE — 94618 PULMONARY STRESS TESTING: CPT

## 2025-04-22 ASSESSMENT — PATIENT HEALTH QUESTIONNAIRE - PHQ9
2. FEELING DOWN, DEPRESSED OR HOPELESS: NOT AT ALL
10. IF YOU CHECKED OFF ANY PROBLEMS, HOW DIFFICULT HAVE THESE PROBLEMS MADE IT FOR YOU TO DO YOUR WORK, TAKE CARE OF THINGS AT HOME, OR GET ALONG WITH OTHER PEOPLE: EXTREMELY DIFFICULT
SUM OF ALL RESPONSES TO PHQ QUESTIONS 1-9: 5
1. LITTLE INTEREST OR PLEASURE IN DOING THINGS: NOT AT ALL
5. POOR APPETITE OR OVEREATING: NOT AT ALL
SUM OF ALL RESPONSES TO PHQ QUESTIONS 1-9: 5
8. MOVING OR SPEAKING SO SLOWLY THAT OTHER PEOPLE COULD HAVE NOTICED. OR THE OPPOSITE, BEING SO FIGETY OR RESTLESS THAT YOU HAVE BEEN MOVING AROUND A LOT MORE THAN USUAL: NOT AT ALL
9. THOUGHTS THAT YOU WOULD BE BETTER OFF DEAD, OR OF HURTING YOURSELF: NOT AT ALL
4. FEELING TIRED OR HAVING LITTLE ENERGY: MORE THAN HALF THE DAYS
6. FEELING BAD ABOUT YOURSELF - OR THAT YOU ARE A FAILURE OR HAVE LET YOURSELF OR YOUR FAMILY DOWN: NOT AT ALL
SUM OF ALL RESPONSES TO PHQ QUESTIONS 1-9: 5
SUM OF ALL RESPONSES TO PHQ QUESTIONS 1-9: 5
3. TROUBLE FALLING OR STAYING ASLEEP: NEARLY EVERY DAY
7. TROUBLE CONCENTRATING ON THINGS, SUCH AS READING THE NEWSPAPER OR WATCHING TELEVISION: NOT AT ALL

## 2025-04-22 ASSESSMENT — LIFESTYLE VARIABLES: ALCOHOL_USE: NO

## 2025-04-22 NOTE — PROGRESS NOTES
Initial Patient Interview:    Diagnosis:  COPD    Primary Care Physician:Raquel Lopez MD    Pulmonologist:Wilfred Cheng MD    Cardiologist: [facility]    How many times have you been hospitalized or gone to the Emergency Department in the past 12 months?  10    How many of those times were due to your lung condition? 10    Date of recent admission/ED visit:1/21/2025    Number of Lung infections in past 12 months?2    Any history of chest/lung injury or surgery?no    Any upcoming surgeries scheduled:no    Any of the following diagnoses: HTN , DM II , CHF , Liver disease, and Sleep Apnea    Family History of Heart or Lung disease: yes        Do you smoke or currently live with a smoker? no  Have you ever smoked or lived with a smoker? yes      How much and for how long?1.5pk/day x 32 years    What is your home respiratory medication routine:   Controller Medications: Advair, Fasenra, Pulmicort   Rescue Medications: Atrovent, Xopenex, Spiriva respimat  Are you on home Oxygen:  yes   Details: 2 lpm Nasal cannula    Home medical equipment: AVAPS, O2    Any allergies to food or medication: Fish derived products/Shellfish, Iodine, statins, Ancef, Metformin, Methylprednisone, codeine      Social  History & Family Component    Living Situation:    LIVES: with family:  daughter      Pets in the home? no    Does patient have family/friends able to provide support  yes        What type of home does patient live in? one level     Does patient have a yard?  yes,     If yes, can patient care for yard?  no   If no, does patient require assistance to care for yard?  yes, hired service    Transportation Resources:  Can patient drive themselves?  no   If no, who is primary ?   Hired services       Services/[place]/Visual Impaired: yes,    If yes, describe: glasses    Occupation: CNA, pharmacy tech    Education/Grade Level: some college    halfway/disability date: Disabilitiy 2010      Occupational

## 2025-04-22 NOTE — PROGRESS NOTES
Olayinka Jonesenship #[id number] (CSN:[id number]) (64 y.o. F) (Adm: 04/22/25)  [facility]  Pulmonary ITP    Flowsheet Row [facility]IO PULMONARY REHAB from 4/22/2025 in [facility]   Treatment Diagnosis    Treatment Diagnosis 1 COPD   Transplant Date --   Covid Date --   Treatment Diagnosis 2 Asthma   Transplant Date --   Covid Date --   Referral Date 01/30/25   Gold Grade --   PFT Date --  [No recent results]   FVC (Liters) --  [n/a]   FEV1 (%PRED) --  [n/a]   FEV1/FVC --  [n/a]   DLCO (mL/mmHg sec) --  [n/a]   Significant Cardiovascular History --   NYHA Heart Failure Classification --   Co-morbidities --   Individual Treatment Plan    ITP Visit Type Initial assessment   ITP Next Review Date 05/20/25   Visit #/Total Visits 1/36   EF% --  [no recent results]   Risk Stratification --   Retired, Read Only 1st Date of Exercise --   Retired, Read Only Pulmonary ITP --   Supplemental Oxygen    Oxygen Use Yes   Oxygen Assessment    Stages of Change Preparation   Oxygen Type Nasal canula   Oxygen Compliant Yes   O2 Flow Rate (l/min) - Rest 2 l/min   O2 Flow Rate (l/min) - Exercise 2 l/min   O2 Flow Rate (l/min) - Sleep 2 l/min   Breath Sounds --   O2 Sat Greater Than or Equal to 90% Yes   Retired, Read Only Patients Liter Flow --   Oxygen Intervention    Prescribed Oxygen Flow Rate 2   Prescribed SpO2 2   Titration Plan --   Interventions --   Staff/Patient Discussion Yes   Oxygen Education    Oxygen Education Oxygen safety / hazard, Traveling with oxygen, Types of oxygen, Proper care of oxygen equipment, Emergency oxygen usage   Oxygen Goals    Patient Target Goals Keep Spo2 equal to or greater than 90%, Wean O2 as tolerated   Patient Treatment Goal --   Goal Status Initial   Progress Towards Goal --   Patient Treatment Goal (New) --   Goal Status (New) Initial   Progress Towards Goal (New) --   Exercise Assessment    Stages of Change Preparation   [employer] Shortness of Breath Questionnaire Total Score 102   Assisted

## 2025-04-29 ENCOUNTER — HOSPITAL ENCOUNTER (OUTPATIENT)
Facility: HOSPITAL | Age: 65
Setting detail: RECURRING SERIES
Discharge: HOME | End: 2025-05-02
Payer: MEDICARE

## 2025-04-29 PROCEDURE — G0239 OTH RESP PROC, GROUP: HCPCS

## 2025-04-29 PROCEDURE — G0237 THERAPEUTIC PROCD STRG ENDUR: HCPCS

## 2025-04-29 NOTE — PROGRESS NOTES
Pulmonary/RT Note    Visit #               Olayinka Jones is a 64 y.o. female presented today for pulmonary rehab. She state that there have been no changes in medication or health since the last visit.          She has a target heart rate of . She tolerated the exercise session well and has a pain level of 6 (chronic). Patient states RPE for session today was 4 and RPD was a 4. Today the pt did:      Nustep: 0 min  Arm bike: 15 min  Walkin min  Resistance bands: 0 min   Breathing retraining:15  min  Bodyweight: 5 min  Education:   5 min Pursed lip breathing  Exercise equipment orientation and benefits  Self pacing and energy conservation  Recovery/Monitorin min             Physician available for emergencies:           RT Karlene 2025 10:16 AM   Mustarde Flap Text: The defect edges were debeveled with a #15 scalpel blade.  Given the size, depth and location of the defect and the proximity to free margins a Mustarde flap was deemed most appropriate.  Using a sterile surgical marker, an appropriate flap was drawn incorporating the defect. The area thus outlined was incised with a #15 scalpel blade.  The skin margins were undermined to an appropriate distance in all directions utilizing iris scissors.

## 2025-05-01 ENCOUNTER — HOSPITAL ENCOUNTER (OUTPATIENT)
Facility: HOSPITAL | Age: 65
Setting detail: RECURRING SERIES
Discharge: HOME | End: 2025-05-04
Payer: MEDICARE

## 2025-05-01 PROCEDURE — G0239 OTH RESP PROC, GROUP: HCPCS

## 2025-05-01 PROCEDURE — G0237 THERAPEUTIC PROCD STRG ENDUR: HCPCS

## 2025-05-01 NOTE — PROGRESS NOTES
Pulmonary/RT Note    Visit #        3/36       Olayinka Jones is a 64 y.o. female presented today for pulmonary rehab. She states that there have been no changes in medication or health since the last visit.          She has a target heart rate of . She tolerated the exercise session well and has a pain level of 0. Patient states RPE for session today was 6 and RPD was a 6. Today the pt did the following.    Nustep:10 min  Arm bike: 15 min  Breathing retraining:15 min  Recovery and Monitoring:15 min             Physician available for emergencies: Michael RAMIREZ, RT 5/1/2025 9:29 AM

## 2025-05-06 ENCOUNTER — HOSPITAL ENCOUNTER (OUTPATIENT)
Facility: HOSPITAL | Age: 65
Setting detail: RECURRING SERIES
Discharge: HOME | End: 2025-05-09
Payer: MEDICARE

## 2025-05-06 PROCEDURE — G0237 THERAPEUTIC PROCD STRG ENDUR: HCPCS

## 2025-05-06 NOTE — PROGRESS NOTES
Pulmonary/RT Note    Visit #        4/36       Olayinka Jones is a 64 y.o. female presented today for pulmonary rehab. She states that she has not been feeling well, wanted to come it to attempt to exercise.  Was able to do breathing retraining and arm ergometer before her SOB increased to the point that we asked her to stop.  She is scheduled to see her pulmonologist tomorrow.      She has a target heart rate of . She tolerated the exercise session well and has a pain level of 5. Patient states RPE for session today was 7 and RPD was a 4. Today the pt did the following.    Arm bike: 15 min  Breathing retraining:15 min  Recovery and Monitoring:15 min             Physician available for emergencies: Pako RAMIREZ, RT 5/6/2025 12:52 PM

## 2025-05-07 ENCOUNTER — OFFICE VISIT (OUTPATIENT)
Age: 65
End: 2025-05-07
Payer: MEDICARE

## 2025-05-07 VITALS
TEMPERATURE: 97.8 F | RESPIRATION RATE: 18 BRPM | BODY MASS INDEX: 45.04 KG/M2 | HEIGHT: 63 IN | DIASTOLIC BLOOD PRESSURE: 54 MMHG | HEART RATE: 78 BPM | SYSTOLIC BLOOD PRESSURE: 122 MMHG | WEIGHT: 254.2 LBS | OXYGEN SATURATION: 93 %

## 2025-05-07 DIAGNOSIS — J44.9 STEROID-DEPENDENT COPD (HCC): ICD-10-CM

## 2025-05-07 DIAGNOSIS — J96.11 CHRONIC RESPIRATORY FAILURE WITH HYPOXIA (HCC): Primary | ICD-10-CM

## 2025-05-07 DIAGNOSIS — Z92.241 STEROID-DEPENDENT COPD (HCC): ICD-10-CM

## 2025-05-07 PROCEDURE — 3078F DIAST BP <80 MM HG: CPT | Performed by: HOSPITALIST

## 2025-05-07 PROCEDURE — 1124F ACP DISCUSS-NO DSCNMKR DOCD: CPT | Performed by: HOSPITALIST

## 2025-05-07 PROCEDURE — 99215 OFFICE O/P EST HI 40 MIN: CPT | Performed by: HOSPITALIST

## 2025-05-07 PROCEDURE — 3074F SYST BP LT 130 MM HG: CPT | Performed by: HOSPITALIST

## 2025-05-07 RX ORDER — FLUTICASONE PROPIONATE AND SALMETEROL XINAFOATE 230; 21 UG/1; UG/1
2 AEROSOL, METERED RESPIRATORY (INHALATION) 2 TIMES DAILY
Qty: 3 EACH | Refills: 3 | Status: SHIPPED | OUTPATIENT
Start: 2025-05-07

## 2025-05-07 RX ORDER — PREDNISONE 20 MG/1
20 TABLET ORAL 2 TIMES DAILY
Qty: 10 TABLET | Refills: 0 | Status: SHIPPED | OUTPATIENT
Start: 2025-05-07 | End: 2025-05-12

## 2025-05-07 RX ORDER — LEVALBUTEROL INHALATION SOLUTION 1.25 MG/3ML
1.25 SOLUTION RESPIRATORY (INHALATION) EVERY 4 HOURS PRN
Qty: 180 EACH | Refills: 5 | Status: SHIPPED | OUTPATIENT
Start: 2025-05-07

## 2025-05-07 RX ORDER — FLUTICASONE PROPIONATE 50 MCG
2 SPRAY, SUSPENSION (ML) NASAL DAILY
Qty: 16 G | Refills: 2 | Status: SHIPPED | OUTPATIENT
Start: 2025-05-07

## 2025-05-07 NOTE — PROGRESS NOTES
[address]  [address]  [place]  [address]  [phone]      [facility]  Pulmonary, Critical Care, and Sleep Medicine    Pulmonary Office F/U  Name: Olayinka Jones 64 y.o. female   MRN: [id number]  : 1960  Service Date: 25   Chief Complaint:   Chief Complaint   Patient presents with    COPD        History of Present Illness:   Olayinka Jones is a 64 y.o. female, who presents to Pulmonary clinic for F/U of asthma/COPD.  Pt last seen in our clinic on 24   Was previously on chronic prednisone, but taken off of it several weeks ago.    She reports significant shortness of breath with frequent wheeze, sputum production, chest congestion, and limited ability to do much movement in her house.  She needs renewal of her oxygen therapy  Using levalbuterol nebs 3-4 x daily  Pt compliant with advair, spiriva,   mMRC still at 4  Uses IVAPS-AE nightly  Still able to receive Fasenra  Patient also compliant with chronic azithromycin therapy 250 mg MWF  Has been going to pulmonary rehab and feels like it helps somewhat but still very out of breath      Past Medical History:   Diagnosis Date    Asthma     Chronic lung disease     Cystocele, midline     Diabetes mellitus (HCC)     GERD (gastroesophageal reflux disease)     Hidradenitis suppurativa     Hyperlipidemia     Hypertension     MARVIN on CPAP     CPAP    Stress incontinence      Past Surgical History:   Procedure Laterality Date    APPENDECTOMY      BREAST SURGERY      Right breast benign tumor removal    CARDIAC CATHETERIZATION  2020    CATARACT REMOVAL Right     CHOLECYSTECTOMY      DILATION AND CURETTAGE OF UTERUS      Dysfunctional uterine bleeding, thought 2/2 fibroids    TUBAL LIGATION       Family History   Problem Relation Age of Onset    Hypertension Mother     Stroke Mother     Breast Cancer Mother         Bilateral mastectomies    Cancer Mother         ovarian and breast    Heart Failure

## 2025-05-07 NOTE — PROGRESS NOTES
[facility]    [address]    [place] [address]    (PH) [phone]    (FX) [phone]    Simple walk test done in office today. Qualifying O2 sats:    O2 Sat on Room air is : 95 %, Pulse 71, SOB scale 0    Walked: 34 m on room air  : 92 %, Pulse 94, SOB scale 2    68 m on room air  : 92 %, Pulse 98, SOB scale 8    102 m on room air  : 87 %, Pulse 98, SOB scale 8    136m on 2 pulse o2  94% pulse 85  SOB scale 4    O2 Sat on 2 (PULSE) O2 is : 96 %, Pulse 79, SOB scale 4    Tech comments regarding testing: Pt walked 136m starting on room air.  Was placed on O2 at setting of 2 pulse.  Pt tolerated well.  Dr Cheng notified.     Gloria Acevedo MA

## 2025-05-07 NOTE — PROGRESS NOTES
Olayinka Kolby Fergusonman presents today for   Chief Complaint   Patient presents with    COPD       Is someone accompanying this pt? N    Is the patient using any DME equipment during OV? OXYGEN    -[employer]    Depression Screenin/22/2025     8:52 AM   PHQ-9 Questionaire   Little interest or pleasure in doing things 0   Feeling down, depressed, or hopeless 0   Trouble falling or staying asleep, or sleeping too much 3   Feeling tired or having little energy 2   Poor appetite or overeating 0   Feeling bad about yourself - or that you are a failure or have let yourself or your family down 0   Trouble concentrating on things, such as reading the newspaper or watching television 0   Moving or speaking so slowly that other people could have noticed. Or the opposite - being so fidgety or restless that you have been moving around a lot more than usual 0   Thoughts that you would be better off dead, or of hurting yourself in some way 0   PHQ-9 Total Score 5   If you checked off any problems, how difficult have these problems made it for you to do your work, take care of things at home, or get along with other people? 3       Learning Assessment:    Failed to redirect to the Timeline version of the Awareness Card SmartLink.    Abuse Screening:         No data to display                Fall Risk    Failed to redirect to the Timeline version of the Awareness Card SmartLink.    Coordination of Care:    1. Have you been to the ER, urgent care clinic since your last visit? Hospitalized since your last visit? N    2. Have you seen or consulted any other health care providers outside of the [facility] since your last visit? Include any pap smears or colon screening. N    Medication list has been update per patient.

## 2025-05-14 NOTE — DIABETES MGMT
Diabetes Patient/Family Education Record    Factors That May Influence Patients Ability to Learn or Comply with Recommendations   []   Language barrier    []   Cultural needs   []   Motivation    []   Cognitive limitation    []   Physical   []   Education    []   Physiological factors   []   Hearing/vision/speaking impairment   []   Religion beliefs    []   Financial factors   []  Other:   [x]  No factors identified at this time. Person Instructed:   [x]   Patient   []   Family   []  Other     Preference for Learning:   [x]   Verbal   []   Written   []  Demonstration     Level of Comprehension & Competence:    [x]  Good                                      [] Fair                                     []  Poor                             []  Needs Reinforcement   []  Teach back completed    Education Component: see DM note    [x]  Medication management, including how to administer insulin (if appropriate) and potential medication interactions    [x]  Nutritional management - [] Obtained usual meal pattern   []   Basic carbohydrate counting  []  Plate method  []  Limit concentrated sweets and avoid sweetened beverages  []  Portion control  []    Avoid skipping meals   [x]  Exercise   [x]  Signs, symptoms, and treatment of hyperglycemia and hypoglycemia   [x] Prevention, recognition and treatment of hyperglycemia and hypoglycemia   []  Importance of blood glucose monitoring  [] Blood Glucose targets   []   Provided patient with blood glucose meter  [x]  Has glucometer and supplies at home   []  Instruction on use of the blood glucose meter and recommended monitoring schedule   [x]  Discuss the importance of HbA1C monitoring. Patients A1c is __7.9_ %.  This is equivalent to average glucose of   _143 mg/dl for the past 2-3 months.   []  Sick day guidelines   []  Proper use and disposal of lancets, needles, syringes or insulin pens (if appropriate)   [x]  Potential long-term complications (retinopathy, kidney disease, neuropathy, foot care)   [x] Information about whom to contact in case of emergency or for more information  - follow up with PCP    []  Goal:  Patient/family will demonstrate understanding of Diabetes Self- Management Skills by: (date) _______  Plan for post-discharge education or self-management support:    [] Outpatient class schedule provided            [] Patient Declined    [] Scheduled for outpatient classes (date) _______    [] Written information provided  Verify: [x] Prior to admission Diabetes medications    Does patient understand how diabetes medications work? ______yes______________________  Does patient have difficulty obtaining diabetes medications or testing supplies? ____no______       Juan Miller MPH RN Thelma Escalera  Pager [phone]  Office [phone] Attending Only

## 2025-05-22 ENCOUNTER — APPOINTMENT (OUTPATIENT)
Facility: HOSPITAL | Age: 65
End: 2025-05-22
Payer: MEDICARE

## 2025-05-27 ENCOUNTER — APPOINTMENT (OUTPATIENT)
Facility: HOSPITAL | Age: 65
End: 2025-05-27
Payer: MEDICARE

## 2025-05-28 ENCOUNTER — HOSPITAL ENCOUNTER (OUTPATIENT)
Facility: HOSPITAL | Age: 65
Setting detail: INFUSION SERIES
Discharge: HOME | End: 2025-05-31
Payer: MEDICARE

## 2025-05-28 VITALS
OXYGEN SATURATION: 93 % | TEMPERATURE: 98 F | RESPIRATION RATE: 20 BRPM | DIASTOLIC BLOOD PRESSURE: 61 MMHG | HEART RATE: 76 BPM | SYSTOLIC BLOOD PRESSURE: 152 MMHG

## 2025-05-28 DIAGNOSIS — J45.51 SEVERE PERSISTENT ASTHMA WITH EXACERBATION (HCC): Primary | ICD-10-CM

## 2025-05-28 PROCEDURE — 96372 THER/PROPH/DIAG INJ SC/IM: CPT

## 2025-05-28 PROCEDURE — 6360000002 HC RX W HCPCS: Performed by: INTERNAL MEDICINE

## 2025-05-28 RX ORDER — EPINEPHRINE 1 MG/ML
0.3 INJECTION, SOLUTION, CONCENTRATE INTRAVENOUS PRN
OUTPATIENT
Start: 2025-07-23

## 2025-05-28 RX ORDER — ALBUTEROL SULFATE 90 UG/1
4 INHALANT RESPIRATORY (INHALATION) PRN
OUTPATIENT
Start: 2025-07-23

## 2025-05-28 RX ORDER — DIPHENHYDRAMINE HYDROCHLORIDE 50 MG/ML
50 INJECTION, SOLUTION INTRAMUSCULAR; INTRAVENOUS
OUTPATIENT
Start: 2025-07-23

## 2025-05-28 RX ORDER — ONDANSETRON 2 MG/ML
8 INJECTION INTRAMUSCULAR; INTRAVENOUS
OUTPATIENT
Start: 2025-07-23

## 2025-05-28 RX ORDER — ACETAMINOPHEN 325 MG/1
650 TABLET ORAL
OUTPATIENT
Start: 2025-07-23

## 2025-05-28 RX ORDER — SODIUM CHLORIDE 9 MG/ML
INJECTION, SOLUTION INTRAVENOUS CONTINUOUS
OUTPATIENT
Start: 2025-07-23

## 2025-05-28 RX ORDER — HYDROCORTISONE SODIUM SUCCINATE 100 MG/2ML
100 INJECTION INTRAMUSCULAR; INTRAVENOUS
OUTPATIENT
Start: 2025-07-23

## 2025-05-28 RX ADMIN — BENRALIZUMAB 30 MG: 30 INJECTION, SOLUTION SUBCUTANEOUS at 13:40

## 2025-05-28 ASSESSMENT — PAIN - FUNCTIONAL ASSESSMENT: PAIN_FUNCTIONAL_ASSESSMENT: 0-10

## 2025-05-28 ASSESSMENT — PAIN DESCRIPTION - DESCRIPTORS: DESCRIPTORS: BURNING;NUMBNESS;TINGLING

## 2025-05-28 NOTE — PROGRESS NOTES
hospitals Progress Note    Date: May 28, 2025    Name: Olayinka Jones    MRN: [id number]         : 1960    Ms. Jones arrived in the hospitals today at 1320, ambulatory and in stable condition, here for her FASENRA INJECTION (EVERY 8 WEEKS).  She was assessed and education was provided.     Ms. Jones's vitals were reviewed.  Vitals:    25 1320   BP: (!) 152/61   Pulse: 76   Resp: 20   Temp: 98 °F (36.7 °C)   SpO2: 93%         Ms. Jones stated that she was doing fairly well, and was tolerating the FASENRA injections well, and  she voiced no major complaints.    She denied having any current signs of infection, but stated that she is on a continuous prophylactic Zithromax regimen that she takes 3 times per week.       .  Benralizumab (FASENRA) 30 mg, was administered SQ, in the back of her LEFT ARM at 1340, per order and without incident.            Ms. Jones tolerated well and voiced no complaints.      [facility] condition at 1345.     She is to return in 8 weeks, on Wednesday, 25 at 1400, for her next appointment, for her next FASENRA Injection.       Radha Saunders RN  May 28, 2025  1:39 PM

## 2025-05-29 ENCOUNTER — HOSPITAL ENCOUNTER (OUTPATIENT)
Facility: HOSPITAL | Age: 65
Setting detail: RECURRING SERIES
End: 2025-05-29
Payer: MEDICARE

## 2025-07-23 ENCOUNTER — HOSPITAL ENCOUNTER (OUTPATIENT)
Facility: HOSPITAL | Age: 65
Setting detail: INFUSION SERIES
Discharge: HOME | End: 2025-07-26
Payer: MEDICARE

## 2025-07-23 VITALS
SYSTOLIC BLOOD PRESSURE: 149 MMHG | RESPIRATION RATE: 20 BRPM | TEMPERATURE: 97.5 F | DIASTOLIC BLOOD PRESSURE: 80 MMHG | HEART RATE: 72 BPM | OXYGEN SATURATION: 95 %

## 2025-07-23 DIAGNOSIS — J44.89 COPD WITH ASTHMA (HCC): Primary | ICD-10-CM

## 2025-07-23 PROCEDURE — 96372 THER/PROPH/DIAG INJ SC/IM: CPT

## 2025-07-23 PROCEDURE — 6360000002 HC RX W HCPCS: Performed by: HOSPITALIST

## 2025-07-23 RX ORDER — ALBUTEROL SULFATE 90 UG/1
4 INHALANT RESPIRATORY (INHALATION) PRN
OUTPATIENT
Start: 2025-09-17

## 2025-07-23 RX ORDER — HYDROCORTISONE SODIUM SUCCINATE 100 MG/2ML
100 INJECTION INTRAMUSCULAR; INTRAVENOUS
OUTPATIENT
Start: 2025-09-17

## 2025-07-23 RX ORDER — EPINEPHRINE 1 MG/ML
0.3 INJECTION, SOLUTION, CONCENTRATE INTRAVENOUS PRN
OUTPATIENT
Start: 2025-09-17

## 2025-07-23 RX ORDER — SODIUM CHLORIDE 9 MG/ML
INJECTION, SOLUTION INTRAVENOUS CONTINUOUS
OUTPATIENT
Start: 2025-09-17

## 2025-07-23 RX ORDER — DIPHENHYDRAMINE HYDROCHLORIDE 50 MG/ML
50 INJECTION, SOLUTION INTRAMUSCULAR; INTRAVENOUS
OUTPATIENT
Start: 2025-09-17

## 2025-07-23 RX ORDER — ACETAMINOPHEN 325 MG/1
650 TABLET ORAL
OUTPATIENT
Start: 2025-09-17

## 2025-07-23 RX ORDER — ONDANSETRON 2 MG/ML
8 INJECTION INTRAMUSCULAR; INTRAVENOUS
OUTPATIENT
Start: 2025-09-17

## 2025-07-23 RX ADMIN — BENRALIZUMAB 30 MG: 30 INJECTION, SOLUTION SUBCUTANEOUS at 14:05

## 2025-07-23 ASSESSMENT — PAIN - FUNCTIONAL ASSESSMENT: PAIN_FUNCTIONAL_ASSESSMENT: 0-10

## 2025-07-23 ASSESSMENT — PAIN DESCRIPTION - DESCRIPTORS: DESCRIPTORS: BURNING;NUMBNESS;TINGLING

## 2025-07-23 NOTE — PROGRESS NOTES
[facility] Progress Note    Date: 2025    Name: Olayinka Jones    MRN: [id number]         : 1960    [facility]. [facility] in the [facility] today at 1355 ambulatory with assistance of a walker, and in stable condition, here for her FASENRA INJECTION (EVERY 8 WEEKS).  She was assessed and education was provided.     Ms. oJnes's vitals were reviewed.  Vitals:    25 1355   BP: (!) 149/80   Pulse: 72   Resp: 20   Temp: 97.5 °F (36.4 °C)   SpO2: 95%     Ms. Jones stated that she was doing fairly well, and was tolerating the FASENRA injections well, and  she voiced no major complaints.     She denied having any current signs of infection, but stated that she is on a continuous prophylactic Zithromax regimen that she takes 3 times per week. Antibiotic prescribed by her Pulmonary MD.    Benralizumab (FASENRA) 30 mg was administered SQ, in the back of her RIGHT ARM per order and without incident.     Ms. Jones tolerated the injection well and voiced no complaints.     Discharge instructions reviewed with patient and she expressed understanding. Her armband was removed and shredded.     [facility], ambulatory and with assistance of a walker, and in stable condition at 1410. She is to return in 8 weeks on 25 at 1400 for her next FASENRA Injection.       Lashonda Hartmann RN  2025  2:22 PM

## (undated) DEVICE — CATH DIAG F6 TL 3DRC 100CM -- INFINITI - ORDER AS EA

## (undated) DEVICE — TR BAND RADIAL ARTERY COMPRESSION DEVICE: Brand: TR BAND

## (undated) DEVICE — RADIFOCUS OPTITORQUE ANGIOGRAPHIC CATHETER: Brand: OPTITORQUE

## (undated) DEVICE — SYR 50ML SLIP TIP NSAF LF STRL --

## (undated) DEVICE — CATHETER DIAG AD 5FR L100CM COR GRY HYDRPHLC NYL MPA 2 W/ 2

## (undated) DEVICE — FLUFF AND POLYMER UNDERPAD,EXTRA HEAVY: Brand: WINGS

## (undated) DEVICE — CATHETER ANGIO 5FR L100CM GRY S STL NYL JL3.5 3 SEG BRAID L

## (undated) DEVICE — RADIFOCUS GLIDEWIRE: Brand: GLIDEWIRE

## (undated) DEVICE — PACK PROCEDURE SURG VASC CATH 161 MMC LF

## (undated) DEVICE — FLEX ADVANTAGE 3000CC: Brand: FLEX ADVANTAGE

## (undated) DEVICE — CATHETER SUCT TR FL TIP 14FR W/ O CTRL

## (undated) DEVICE — PRESSURE MONITORING SET: Brand: TRUWAVE

## (undated) DEVICE — BASIN EMESIS 500CC ROSE 250/CS 60/PLT: Brand: MEDEGEN MEDICAL PRODUCTS, LLC

## (undated) DEVICE — SYRINGE MED 25GA 3ML L5/8IN SUBQ PLAS W/ DETACH NDL SFTY

## (undated) DEVICE — SOLUTION IRRIG 1000ML H2O STRL BLT

## (undated) DEVICE — BITE BLOCK ENDOSCP UNIV AD 6 TO 9.4 MM

## (undated) DEVICE — GLIDESHEATH SLENDER STAINLESS STEEL KIT: Brand: GLIDESHEATH SLENDER

## (undated) DEVICE — FCPS RAD JAW 4LC 240CM W/NDL -- BX/20 RADIAL JAW 4

## (undated) DEVICE — MEDI-VAC NON-CONDUCTIVE SUCTION TUBING: Brand: CARDINAL HEALTH

## (undated) DEVICE — SET FLD ADMIN 3 W STPCOCK FIX FEM L BOR 1IN

## (undated) DEVICE — STERILE POLYISOPRENE POWDER-FREE SURGICAL GLOVES: Brand: PROTEXIS

## (undated) DEVICE — AIRLIFE™ NASAL OXYGEN CANNULA CURVED, FLARED TIP WITH 14 FOOT (4.3 M) CRUSH-RESISTANT TUBING, OVER-THE-EAR STYLE: Brand: AIRLIFE™

## (undated) DEVICE — GAUZE SPONGES,16 PLY: Brand: CURITY

## (undated) DEVICE — MEDI-VAC SUCTION HIGH CAPACITY: Brand: CARDINAL HEALTH

## (undated) DEVICE — GUIDEWIRE VASC L260CM DIA0035IN TIP L3MM PTFE J STD TAPR FIX

## (undated) DEVICE — ENDOSCOPY PUMP TUBING/ CAP SET: Brand: ERBE

## (undated) DEVICE — CATHETER DIAG AD L100CM DIA6FR STD JUDKINS L 4 POLYUR COR

## (undated) DEVICE — PROCEDURE KIT FLUID MGMT 10 FR CUST MAINFOLD

## (undated) DEVICE — INTRODUCER SHTH 6FR CANN L11CM DIL TIP 35MM GRN TUNGSTEN